# Patient Record
Sex: MALE | Race: BLACK OR AFRICAN AMERICAN | NOT HISPANIC OR LATINO | Employment: OTHER | URBAN - METROPOLITAN AREA
[De-identification: names, ages, dates, MRNs, and addresses within clinical notes are randomized per-mention and may not be internally consistent; named-entity substitution may affect disease eponyms.]

---

## 2017-08-08 ENCOUNTER — LAB VISIT (OUTPATIENT)
Dept: LAB | Facility: HOSPITAL | Age: 57
End: 2017-08-08
Attending: NURSE PRACTITIONER
Payer: MEDICAID

## 2017-08-08 DIAGNOSIS — I10 ESSENTIAL HYPERTENSION, MALIGNANT: ICD-10-CM

## 2017-08-08 DIAGNOSIS — I50.20 HEART FAILURE, SYSTOLIC: ICD-10-CM

## 2017-08-08 LAB
ALBUMIN SERPL BCP-MCNC: 4 G/DL
ALP SERPL-CCNC: 78 U/L
ALT SERPL W/O P-5'-P-CCNC: 16 U/L
ANION GAP SERPL CALC-SCNC: 13 MMOL/L
AST SERPL-CCNC: 14 U/L
BASOPHILS # BLD AUTO: 0.03 K/UL
BASOPHILS NFR BLD: 0.5 %
BILIRUB SERPL-MCNC: 0.5 MG/DL
BUN SERPL-MCNC: 15 MG/DL
CALCIUM SERPL-MCNC: 9.2 MG/DL
CHLORIDE SERPL-SCNC: 105 MMOL/L
CO2 SERPL-SCNC: 21 MMOL/L
CREAT SERPL-MCNC: 1.4 MG/DL
DIFFERENTIAL METHOD: NORMAL
EOSINOPHIL # BLD AUTO: 0.1 K/UL
EOSINOPHIL NFR BLD: 0.9 %
ERYTHROCYTE [DISTWIDTH] IN BLOOD BY AUTOMATED COUNT: 13.3 %
EST. GFR  (AFRICAN AMERICAN): >60 ML/MIN/1.73 M^2
EST. GFR  (NON AFRICAN AMERICAN): 56.2 ML/MIN/1.73 M^2
GLUCOSE SERPL-MCNC: 338 MG/DL
HCT VFR BLD AUTO: 42.8 %
HGB BLD-MCNC: 15 G/DL
LYMPHOCYTES # BLD AUTO: 1.5 K/UL
LYMPHOCYTES NFR BLD: 27 %
MCH RBC QN AUTO: 30.2 PG
MCHC RBC AUTO-ENTMCNC: 35 G/DL
MCV RBC AUTO: 86 FL
MONOCYTES # BLD AUTO: 0.4 K/UL
MONOCYTES NFR BLD: 7.2 %
NEUTROPHILS # BLD AUTO: 3.7 K/UL
NEUTROPHILS NFR BLD: 64 %
PLATELET # BLD AUTO: 211 K/UL
PMV BLD AUTO: 10.7 FL
POTASSIUM SERPL-SCNC: 4.5 MMOL/L
PROT SERPL-MCNC: 7.7 G/DL
RBC # BLD AUTO: 4.96 M/UL
SODIUM SERPL-SCNC: 139 MMOL/L
TSH SERPL DL<=0.005 MIU/L-ACNC: 0.69 UIU/ML
WBC # BLD AUTO: 5.7 K/UL

## 2017-08-08 PROCEDURE — 36415 COLL VENOUS BLD VENIPUNCTURE: CPT | Mod: PO

## 2017-08-08 PROCEDURE — 84443 ASSAY THYROID STIM HORMONE: CPT | Mod: PO

## 2017-08-08 PROCEDURE — 80053 COMPREHEN METABOLIC PANEL: CPT | Mod: PO

## 2017-08-08 PROCEDURE — 83036 HEMOGLOBIN GLYCOSYLATED A1C: CPT

## 2017-08-08 PROCEDURE — 85025 COMPLETE CBC W/AUTO DIFF WBC: CPT | Mod: PO

## 2017-08-09 LAB
ESTIMATED AVG GLUCOSE: 263 MG/DL
HBA1C MFR BLD HPLC: 10.8 %

## 2018-02-07 ENCOUNTER — HOSPITAL ENCOUNTER (OUTPATIENT)
Dept: RADIOLOGY | Facility: HOSPITAL | Age: 58
Discharge: HOME | End: 2018-02-07
Attending: NURSE PRACTITIONER
Payer: MEDICAID

## 2018-02-07 ENCOUNTER — HOSPITAL ENCOUNTER (OUTPATIENT)
Dept: CARDIOLOGY | Facility: CLINIC | Age: 58
Discharge: HOME | End: 2018-02-07
Payer: MEDICAID

## 2018-02-07 DIAGNOSIS — R07.9 CHEST PAIN, UNSPECIFIED: Primary | ICD-10-CM

## 2018-02-07 DIAGNOSIS — R07.9 CHEST PAIN, UNSPECIFIED: ICD-10-CM

## 2018-02-07 DIAGNOSIS — Z13.0 SCREENING FOR IRON DEFICIENCY ANEMIA: ICD-10-CM

## 2018-02-07 DIAGNOSIS — I10 HTN (HYPERTENSION): ICD-10-CM

## 2018-02-07 DIAGNOSIS — I21.9: ICD-10-CM

## 2018-02-07 DIAGNOSIS — R60.9 EDEMA: ICD-10-CM

## 2018-02-07 PROCEDURE — 93005 ELECTROCARDIOGRAM TRACING: CPT | Mod: PBBFAC,PO | Performed by: NURSE PRACTITIONER

## 2018-02-07 PROCEDURE — 71046 X-RAY EXAM CHEST 2 VIEWS: CPT | Mod: 26,,, | Performed by: RADIOLOGY

## 2018-02-07 PROCEDURE — 93010 ELECTROCARDIOGRAM REPORT: CPT | Mod: ,,, | Performed by: INTERNAL MEDICINE

## 2018-02-07 PROCEDURE — 71046 X-RAY EXAM CHEST 2 VIEWS: CPT | Mod: TC,PO

## 2018-04-23 ENCOUNTER — HOSPITAL ENCOUNTER (OUTPATIENT)
Dept: RADIOLOGY | Facility: HOSPITAL | Age: 58
Discharge: HOME | End: 2018-04-23
Attending: NURSE PRACTITIONER
Payer: MEDICAID

## 2018-04-23 DIAGNOSIS — I82.90 THROMBUS: ICD-10-CM

## 2018-04-23 DIAGNOSIS — I82.90 THROMBUS: Primary | ICD-10-CM

## 2018-04-23 PROCEDURE — 93970 EXTREMITY STUDY: CPT | Mod: 26,,, | Performed by: RADIOLOGY

## 2018-04-23 PROCEDURE — 93970 EXTREMITY STUDY: CPT | Mod: TC,PO

## 2021-03-18 ENCOUNTER — IMMUNIZATION (OUTPATIENT)
Dept: PRIMARY CARE CLINIC | Facility: CLINIC | Age: 61
End: 2021-03-18

## 2021-03-18 DIAGNOSIS — Z23 NEED FOR VACCINATION: Primary | ICD-10-CM

## 2021-03-18 PROCEDURE — 0011A COVID-19, MRNA, LNP-S, PF, 100 MCG/0.5 ML DOSE VACCINE: ICD-10-PCS | Mod: CV19,S$GLB,, | Performed by: FAMILY MEDICINE

## 2021-03-18 PROCEDURE — 0011A COVID-19, MRNA, LNP-S, PF, 100 MCG/0.5 ML DOSE VACCINE: CPT | Mod: CV19,S$GLB,, | Performed by: FAMILY MEDICINE

## 2021-03-18 PROCEDURE — 91301 COVID-19, MRNA, LNP-S, PF, 100 MCG/0.5 ML DOSE VACCINE: ICD-10-PCS | Mod: S$GLB,,, | Performed by: FAMILY MEDICINE

## 2021-03-18 PROCEDURE — 91301 COVID-19, MRNA, LNP-S, PF, 100 MCG/0.5 ML DOSE VACCINE: CPT | Mod: S$GLB,,, | Performed by: FAMILY MEDICINE

## 2021-04-15 ENCOUNTER — IMMUNIZATION (OUTPATIENT)
Dept: PRIMARY CARE CLINIC | Facility: CLINIC | Age: 61
End: 2021-04-15

## 2021-04-15 DIAGNOSIS — Z23 NEED FOR VACCINATION: Primary | ICD-10-CM

## 2021-04-15 PROCEDURE — 0012A COVID-19, MRNA, LNP-S, PF, 100 MCG/0.5 ML DOSE VACCINE: CPT | Mod: CV19,S$GLB,, | Performed by: FAMILY MEDICINE

## 2021-04-15 PROCEDURE — 0012A COVID-19, MRNA, LNP-S, PF, 100 MCG/0.5 ML DOSE VACCINE: ICD-10-PCS | Mod: CV19,S$GLB,, | Performed by: FAMILY MEDICINE

## 2021-04-15 PROCEDURE — 91301 COVID-19, MRNA, LNP-S, PF, 100 MCG/0.5 ML DOSE VACCINE: ICD-10-PCS | Mod: S$GLB,,, | Performed by: FAMILY MEDICINE

## 2021-04-15 PROCEDURE — 91301 COVID-19, MRNA, LNP-S, PF, 100 MCG/0.5 ML DOSE VACCINE: CPT | Mod: S$GLB,,, | Performed by: FAMILY MEDICINE

## 2023-10-07 ENCOUNTER — HOSPITAL ENCOUNTER (INPATIENT)
Facility: HOSPITAL | Age: 63
LOS: 5 days | Discharge: OTHER INSTITUTION | DRG: 287 | End: 2023-10-12
Attending: EMERGENCY MEDICINE | Admitting: SPECIALIST
Payer: MEDICAID

## 2023-10-07 DIAGNOSIS — I47.20 VENTRICULAR TACHYCARDIA: ICD-10-CM

## 2023-10-07 DIAGNOSIS — I49.01 VENTRICULAR FIBRILLATION: Primary | ICD-10-CM

## 2023-10-07 DIAGNOSIS — I25.118 CORONARY ARTERY DISEASE OF NATIVE ARTERY OF NATIVE HEART WITH STABLE ANGINA PECTORIS: ICD-10-CM

## 2023-10-07 DIAGNOSIS — E87.6 HYPOKALEMIA: ICD-10-CM

## 2023-10-07 DIAGNOSIS — I48.0 PAF (PAROXYSMAL ATRIAL FIBRILLATION): ICD-10-CM

## 2023-10-07 DIAGNOSIS — Z45.02 DEFIBRILLATOR DISCHARGE: ICD-10-CM

## 2023-10-07 DIAGNOSIS — I47.20 VT (VENTRICULAR TACHYCARDIA): ICD-10-CM

## 2023-10-07 DIAGNOSIS — R07.9 CHEST PAIN: ICD-10-CM

## 2023-10-07 DIAGNOSIS — I50.20 HFREF (HEART FAILURE WITH REDUCED EJECTION FRACTION): ICD-10-CM

## 2023-10-07 PROBLEM — I25.10 CAD (CORONARY ARTERY DISEASE): Status: ACTIVE | Noted: 2023-10-07

## 2023-10-07 PROBLEM — E11.9 DM (DIABETES MELLITUS): Status: ACTIVE | Noted: 2023-10-07

## 2023-10-07 LAB
ALBUMIN SERPL BCP-MCNC: 4.4 G/DL (ref 3.5–5.2)
ALP SERPL-CCNC: 74 U/L (ref 55–135)
ALT SERPL W/O P-5'-P-CCNC: 22 U/L (ref 10–44)
ANION GAP SERPL CALC-SCNC: 18 MMOL/L (ref 8–16)
AORTIC ROOT ANNULUS: 3.29 CM
ASCENDING AORTA: 2.79 CM
AST SERPL-CCNC: 23 U/L (ref 10–40)
AV INDEX (PROSTH): 0.56
AV MEAN GRADIENT: 3 MMHG
AV PEAK GRADIENT: 5 MMHG
AV REGURGITATION PRESSURE HALF TIME: 458.59 MS
AV VALVE AREA BY VELOCITY RATIO: 1.8 CM²
AV VALVE AREA: 1.58 CM²
AV VELOCITY RATIO: 0.63
BASOPHILS # BLD AUTO: 0.06 K/UL (ref 0–0.2)
BASOPHILS NFR BLD: 1.2 % (ref 0–1.9)
BILIRUB SERPL-MCNC: 0.5 MG/DL (ref 0.1–1)
BNP SERPL-MCNC: 641 PG/ML (ref 0–99)
BSA FOR ECHO PROCEDURE: 2.01 M2
BUN SERPL-MCNC: 29 MG/DL (ref 8–23)
CALCIUM SERPL-MCNC: 10.1 MG/DL (ref 8.7–10.5)
CHLORIDE SERPL-SCNC: 97 MMOL/L (ref 95–110)
CO2 SERPL-SCNC: 26 MMOL/L (ref 23–29)
CREAT SERPL-MCNC: 2.2 MG/DL (ref 0.5–1.4)
CV ECHO LV RWT: 0.26 CM
DIFFERENTIAL METHOD: NORMAL
DOP CALC AO PEAK VEL: 1.15 M/S
DOP CALC AO VTI: 23.2 CM
DOP CALC LVOT AREA: 2.8 CM2
DOP CALC LVOT DIAMETER: 1.9 CM
DOP CALC LVOT PEAK VEL: 0.73 M/S
DOP CALC LVOT STROKE VOLUME: 36.56 CM3
DOP CALC RVOT PEAK VEL: 0.75 M/S
DOP CALC RVOT VTI: 19.1 CM
DOP CALCLVOT PEAK VEL VTI: 12.9 CM
E WAVE DECELERATION TIME: 178.9 MSEC
E/A RATIO: 2.69
E/E' RATIO: 19.4 M/S
ECHO LV POSTERIOR WALL: 0.86 CM (ref 0.6–1.1)
EOSINOPHIL # BLD AUTO: 0.2 K/UL (ref 0–0.5)
EOSINOPHIL NFR BLD: 3 % (ref 0–8)
ERYTHROCYTE [DISTWIDTH] IN BLOOD BY AUTOMATED COUNT: 14.2 % (ref 11.5–14.5)
EST. GFR  (NO RACE VARIABLE): 33 ML/MIN/1.73 M^2
FRACTIONAL SHORTENING: 7 % (ref 28–44)
GLUCOSE SERPL-MCNC: 186 MG/DL (ref 70–110)
HCT VFR BLD AUTO: 42 % (ref 40–54)
HGB BLD-MCNC: 14.4 G/DL (ref 14–18)
IMM GRANULOCYTES # BLD AUTO: 0.01 K/UL (ref 0–0.04)
IMM GRANULOCYTES NFR BLD AUTO: 0.2 % (ref 0–0.5)
INTERVENTRICULAR SEPTUM: 0.78 CM (ref 0.6–1.1)
IVRT: 88.49 MSEC
LA MAJOR: 6.91 CM
LA MINOR: 6.97 CM
LA WIDTH: 4.6 CM
LEFT ATRIUM SIZE: 4.39 CM
LEFT ATRIUM VOLUME INDEX MOD: 55 ML/M2
LEFT ATRIUM VOLUME INDEX: 59 ML/M2
LEFT ATRIUM VOLUME MOD: 111.16 CM3
LEFT ATRIUM VOLUME: 119.12 CM3
LEFT INTERNAL DIMENSION IN SYSTOLE: 6.14 CM (ref 2.1–4)
LEFT VENTRICLE DIASTOLIC VOLUME INDEX: 110.51 ML/M2
LEFT VENTRICLE DIASTOLIC VOLUME: 223.24 ML
LEFT VENTRICLE MASS INDEX: 112 G/M2
LEFT VENTRICLE SYSTOLIC VOLUME INDEX: 93.9 ML/M2
LEFT VENTRICLE SYSTOLIC VOLUME: 189.69 ML
LEFT VENTRICULAR INTERNAL DIMENSION IN DIASTOLE: 6.6 CM (ref 3.5–6)
LEFT VENTRICULAR MASS: 226.89 G
LV LATERAL E/E' RATIO: 12.13 M/S
LV SEPTAL E/E' RATIO: 48.5 M/S
LVOT MG: 1.27 MMHG
LVOT MV: 0.53 CM/S
LYMPHOCYTES # BLD AUTO: 2 K/UL (ref 1–4.8)
LYMPHOCYTES NFR BLD: 38.9 % (ref 18–48)
MAGNESIUM SERPL-MCNC: 2.2 MG/DL (ref 1.6–2.6)
MCH RBC QN AUTO: 30.9 PG (ref 27–31)
MCHC RBC AUTO-ENTMCNC: 34.3 G/DL (ref 32–36)
MCV RBC AUTO: 90 FL (ref 82–98)
MONOCYTES # BLD AUTO: 0.5 K/UL (ref 0.3–1)
MONOCYTES NFR BLD: 9 % (ref 4–15)
MV PEAK A VEL: 0.36 M/S
MV PEAK E VEL: 0.97 M/S
MV STENOSIS PRESSURE HALF TIME: 51.88 MS
MV VALVE AREA P 1/2 METHOD: 4.24 CM2
NEUTROPHILS # BLD AUTO: 2.4 K/UL (ref 1.8–7.7)
NEUTROPHILS NFR BLD: 47.7 % (ref 38–73)
NRBC BLD-RTO: 0 /100 WBC
PHOSPHATE SERPL-MCNC: 3.7 MG/DL (ref 2.7–4.5)
PISA AR MAX VEL: 2.7 M/S
PISA TR MAX VEL: 2.59 M/S
PLATELET # BLD AUTO: 206 K/UL (ref 150–450)
PMV BLD AUTO: 10.5 FL (ref 9.2–12.9)
POCT GLUCOSE: 112 MG/DL (ref 70–110)
POCT GLUCOSE: 117 MG/DL (ref 70–110)
POTASSIUM SERPL-SCNC: 3.7 MMOL/L (ref 3.5–5.1)
PROT SERPL-MCNC: 8.7 G/DL (ref 6–8.4)
PV MEAN GRADIENT: 2 MMHG
PV MV: 0.56 M/S
PV PEAK GRADIENT: 2 MMHG
PV PEAK VELOCITY: 0.78 M/S
RA MAJOR: 5.02 CM
RA PRESSURE ESTIMATED: 8 MMHG
RA WIDTH: 4 CM
RBC # BLD AUTO: 4.66 M/UL (ref 4.6–6.2)
RIGHT VENTRICULAR END-DIASTOLIC DIMENSION: 2.76 CM
RV TB RVSP: 11 MMHG
SODIUM SERPL-SCNC: 141 MMOL/L (ref 136–145)
STJ: 2.9 CM
TDI LATERAL: 0.08 M/S
TDI SEPTAL: 0.02 M/S
TDI: 0.05 M/S
TR MAX PG: 27 MMHG
TRICUSPID ANNULAR PLANE SYSTOLIC EXCURSION: 1.8 CM
TROPONIN I SERPL DL<=0.01 NG/ML-MCNC: 0.06 NG/ML (ref 0–0.03)
TV REST PULMONARY ARTERY PRESSURE: 35 MMHG
WBC # BLD AUTO: 5.01 K/UL (ref 3.9–12.7)
Z-SCORE OF LEFT VENTRICULAR DIMENSION IN END DIASTOLE: 1.03
Z-SCORE OF LEFT VENTRICULAR DIMENSION IN END SYSTOLE: 4.11

## 2023-10-07 PROCEDURE — 82962 GLUCOSE BLOOD TEST: CPT

## 2023-10-07 PROCEDURE — 20000000 HC ICU ROOM

## 2023-10-07 PROCEDURE — 99223 1ST HOSP IP/OBS HIGH 75: CPT | Mod: ,,, | Performed by: INTERNAL MEDICINE

## 2023-10-07 PROCEDURE — 93010 ELECTROCARDIOGRAM REPORT: CPT | Mod: ,,, | Performed by: INTERNAL MEDICINE

## 2023-10-07 PROCEDURE — 83735 ASSAY OF MAGNESIUM: CPT | Performed by: REGISTERED NURSE

## 2023-10-07 PROCEDURE — 80053 COMPREHEN METABOLIC PANEL: CPT | Performed by: REGISTERED NURSE

## 2023-10-07 PROCEDURE — 25000003 PHARM REV CODE 250: Performed by: NURSE PRACTITIONER

## 2023-10-07 PROCEDURE — 84100 ASSAY OF PHOSPHORUS: CPT | Performed by: REGISTERED NURSE

## 2023-10-07 PROCEDURE — 63600175 PHARM REV CODE 636 W HCPCS: Performed by: EMERGENCY MEDICINE

## 2023-10-07 PROCEDURE — 84484 ASSAY OF TROPONIN QUANT: CPT | Performed by: REGISTERED NURSE

## 2023-10-07 PROCEDURE — 99223 PR INITIAL HOSPITAL CARE,LEVL III: ICD-10-PCS | Mod: ,,, | Performed by: INTERNAL MEDICINE

## 2023-10-07 PROCEDURE — 83880 ASSAY OF NATRIURETIC PEPTIDE: CPT | Performed by: REGISTERED NURSE

## 2023-10-07 PROCEDURE — 85025 COMPLETE CBC W/AUTO DIFF WBC: CPT | Performed by: REGISTERED NURSE

## 2023-10-07 PROCEDURE — 93010 EKG 12-LEAD: ICD-10-PCS | Mod: ,,, | Performed by: INTERNAL MEDICINE

## 2023-10-07 PROCEDURE — 99285 EMERGENCY DEPT VISIT HI MDM: CPT | Mod: 25

## 2023-10-07 PROCEDURE — 93005 ELECTROCARDIOGRAM TRACING: CPT

## 2023-10-07 RX ORDER — CARVEDILOL 6.25 MG/1
6.25 TABLET ORAL 2 TIMES DAILY
Status: ON HOLD | COMMUNITY
End: 2023-10-16 | Stop reason: HOSPADM

## 2023-10-07 RX ORDER — ASPIRIN 81 MG/1
81 TABLET ORAL DAILY
Status: DISCONTINUED | OUTPATIENT
Start: 2023-10-07 | End: 2023-10-07

## 2023-10-07 RX ORDER — EMPAGLIFLOZIN 10 MG/1
10 TABLET, FILM COATED ORAL
Status: ON HOLD | COMMUNITY
Start: 2023-09-25 | End: 2023-10-16 | Stop reason: HOSPADM

## 2023-10-07 RX ORDER — INSULIN ASPART 100 [IU]/ML
0-10 INJECTION, SOLUTION INTRAVENOUS; SUBCUTANEOUS EVERY 6 HOURS PRN
Status: DISCONTINUED | OUTPATIENT
Start: 2023-10-07 | End: 2023-10-12 | Stop reason: HOSPADM

## 2023-10-07 RX ORDER — CLOPIDOGREL BISULFATE 75 MG/1
75 TABLET ORAL DAILY
Status: DISCONTINUED | OUTPATIENT
Start: 2023-10-07 | End: 2023-10-07

## 2023-10-07 RX ORDER — MAGNESIUM SULFATE HEPTAHYDRATE 40 MG/ML
4 INJECTION, SOLUTION INTRAVENOUS
Status: DISCONTINUED | OUTPATIENT
Start: 2023-10-07 | End: 2023-10-12 | Stop reason: HOSPADM

## 2023-10-07 RX ORDER — POTASSIUM CHLORIDE 7.45 MG/ML
80 INJECTION INTRAVENOUS
Status: DISCONTINUED | OUTPATIENT
Start: 2023-10-07 | End: 2023-10-07

## 2023-10-07 RX ORDER — ASPIRIN 81 MG/1
81 TABLET ORAL DAILY
Status: DISCONTINUED | OUTPATIENT
Start: 2023-10-08 | End: 2023-10-12 | Stop reason: HOSPADM

## 2023-10-07 RX ORDER — GABAPENTIN 300 MG/1
300 CAPSULE ORAL NIGHTLY PRN
Status: ON HOLD | COMMUNITY
Start: 2023-09-23 | End: 2024-01-24 | Stop reason: HOSPADM

## 2023-10-07 RX ORDER — POTASSIUM CHLORIDE 7.45 MG/ML
40 INJECTION INTRAVENOUS
Status: DISCONTINUED | OUTPATIENT
Start: 2023-10-07 | End: 2023-10-07

## 2023-10-07 RX ORDER — POTASSIUM CHLORIDE 7.45 MG/ML
60 INJECTION INTRAVENOUS
Status: DISCONTINUED | OUTPATIENT
Start: 2023-10-07 | End: 2023-10-07

## 2023-10-07 RX ORDER — CLOPIDOGREL BISULFATE 75 MG/1
75 TABLET ORAL DAILY
Status: DISCONTINUED | OUTPATIENT
Start: 2023-10-08 | End: 2023-10-12 | Stop reason: HOSPADM

## 2023-10-07 RX ORDER — GLUCAGON 1 MG
1 KIT INJECTION
Status: DISCONTINUED | OUTPATIENT
Start: 2023-10-07 | End: 2023-10-12 | Stop reason: HOSPADM

## 2023-10-07 RX ORDER — ISOSORBIDE DINITRATE 20 MG/1
20 TABLET ORAL 3 TIMES DAILY
Status: DISCONTINUED | OUTPATIENT
Start: 2023-10-08 | End: 2023-10-08

## 2023-10-07 RX ORDER — FAMOTIDINE 20 MG/1
20 TABLET, FILM COATED ORAL DAILY
Status: DISCONTINUED | OUTPATIENT
Start: 2023-10-07 | End: 2023-10-12 | Stop reason: HOSPADM

## 2023-10-07 RX ORDER — ATORVASTATIN CALCIUM 40 MG/1
40 TABLET, FILM COATED ORAL DAILY
Status: DISCONTINUED | OUTPATIENT
Start: 2023-10-08 | End: 2023-10-12 | Stop reason: HOSPADM

## 2023-10-07 RX ORDER — METOLAZONE 5 MG/1
5 TABLET ORAL DAILY PRN
Status: ON HOLD | COMMUNITY
Start: 2023-09-14 | End: 2023-10-16 | Stop reason: HOSPADM

## 2023-10-07 RX ORDER — MAGNESIUM SULFATE HEPTAHYDRATE 40 MG/ML
2 INJECTION, SOLUTION INTRAVENOUS
Status: DISCONTINUED | OUTPATIENT
Start: 2023-10-07 | End: 2023-10-12 | Stop reason: HOSPADM

## 2023-10-07 RX ORDER — FUROSEMIDE 40 MG/1
40 TABLET ORAL 2 TIMES DAILY
Status: DISCONTINUED | OUTPATIENT
Start: 2023-10-07 | End: 2023-10-08

## 2023-10-07 RX ORDER — FUROSEMIDE 40 MG/1
40 TABLET ORAL 2 TIMES DAILY
Status: ON HOLD | COMMUNITY
Start: 2023-08-06 | End: 2023-10-16 | Stop reason: SDUPTHER

## 2023-10-07 RX ORDER — ATORVASTATIN CALCIUM 40 MG/1
40 TABLET, FILM COATED ORAL
Status: ON HOLD | COMMUNITY
Start: 2023-09-28 | End: 2024-01-24 | Stop reason: HOSPADM

## 2023-10-07 RX ORDER — SODIUM CHLORIDE 0.9 % (FLUSH) 0.9 %
10 SYRINGE (ML) INJECTION
Status: DISCONTINUED | OUTPATIENT
Start: 2023-10-07 | End: 2023-10-12 | Stop reason: HOSPADM

## 2023-10-07 RX ORDER — SACUBITRIL AND VALSARTAN 24; 26 MG/1; MG/1
1 TABLET, FILM COATED ORAL 2 TIMES DAILY
Status: ON HOLD | COMMUNITY
End: 2023-10-16 | Stop reason: HOSPADM

## 2023-10-07 RX ORDER — ISOSORBIDE DINITRATE 20 MG/1
20 TABLET ORAL 3 TIMES DAILY
Status: DISCONTINUED | OUTPATIENT
Start: 2023-10-07 | End: 2023-10-07

## 2023-10-07 RX ORDER — CALCIUM GLUCONATE 20 MG/ML
1 INJECTION, SOLUTION INTRAVENOUS
Status: DISCONTINUED | OUTPATIENT
Start: 2023-10-07 | End: 2023-10-12 | Stop reason: HOSPADM

## 2023-10-07 RX ORDER — CALCIUM GLUCONATE 20 MG/ML
3 INJECTION, SOLUTION INTRAVENOUS
Status: DISCONTINUED | OUTPATIENT
Start: 2023-10-07 | End: 2023-10-12 | Stop reason: HOSPADM

## 2023-10-07 RX ORDER — ATORVASTATIN CALCIUM 40 MG/1
40 TABLET, FILM COATED ORAL DAILY
Status: DISCONTINUED | OUTPATIENT
Start: 2023-10-07 | End: 2023-10-07

## 2023-10-07 RX ORDER — CALCIUM GLUCONATE 20 MG/ML
2 INJECTION, SOLUTION INTRAVENOUS
Status: DISCONTINUED | OUTPATIENT
Start: 2023-10-07 | End: 2023-10-12 | Stop reason: HOSPADM

## 2023-10-07 RX ADMIN — FAMOTIDINE 20 MG: 20 TABLET ORAL at 02:10

## 2023-10-07 RX ADMIN — AMIODARONE HYDROCHLORIDE 0.5 MG/MIN: 1.8 INJECTION, SOLUTION INTRAVENOUS at 08:10

## 2023-10-07 RX ADMIN — SACUBITRIL AND VALSARTAN 1 TABLET: 24; 26 TABLET, FILM COATED ORAL at 08:10

## 2023-10-07 RX ADMIN — AMIODARONE HYDROCHLORIDE 150 MG: 1.5 INJECTION, SOLUTION INTRAVENOUS at 02:10

## 2023-10-07 RX ADMIN — AMIODARONE HYDROCHLORIDE 1 MG/MIN: 1.8 INJECTION, SOLUTION INTRAVENOUS at 03:10

## 2023-10-07 RX ADMIN — AMIODARONE HYDROCHLORIDE 1 MG/MIN: 1.8 INJECTION, SOLUTION INTRAVENOUS at 02:10

## 2023-10-07 RX ADMIN — FUROSEMIDE 40 MG: 40 TABLET ORAL at 08:10

## 2023-10-07 NOTE — H&P
[facility] ([facility])  Critical Care Medicine  History & Physical    Patient Name: Radha Abbott  MRN: [id number]  Admission Date: 10/7/2023  Hospital Length of Stay: 0 days  Code Status: Full Code  Attending Physician: Mg Dial MD   Primary Care Provider: Vasu Kong MD   Principal Problem: <principal problem not specified>    Subjective:     HPI:  63 yr old with hx of CABG, CAD, HTN, HLD and defibrillator developed near syncope 4 days ago and just as he was passing out his defibrillator shocked him and he fully recovered. He did not seek help. This am he noticed a buzzing sound and came to get it checked. He has family at bedside. He is asymptomatic.       Hospital/ICU Course:  No notes on file     Past Medical History:   Diagnosis Date    CAD (coronary artery disease)     Diabetes mellitus     HFrEF (heart failure with reduced ejection fraction)     ICD (implantable cardioverter-defibrillator) in place     MI, old        Past Surgical History:   Procedure Laterality Date    CARDIAC SURGERY         Review of patient's allergies indicates:  No Known Allergies    Family History    None       Tobacco Use    Smoking status: Every Day     Current packs/day: 0.50     Types: Cigarettes    Smokeless tobacco: Not on file   Substance and Sexual Activity    Alcohol use: Yes     Comment: rarely    Drug use: No    Sexual activity: Not on file         Review of Systems   Constitutional: Negative.    HENT: Negative.     Respiratory: Negative.     Cardiovascular: Negative.    Gastrointestinal: Negative.    All other systems reviewed and are negative.    Objective:     Vital Signs (Most Recent):  Temp: 98.6 °F (37 °C) (10/07/23 0946)  Pulse: 70 (10/07/23 1457)  Resp: 16 (10/07/23 1457)  BP: 108/64 (10/07/23 1457)  SpO2: 99 % (10/07/23 1457) Vital Signs (24h Range):  Temp:  [98.6 °F (37 °C)] 98.6 °F (37 °C)  Pulse:  [] 70  Resp:  [15-22] 16  SpO2:  [99 %-100 %] 99 %  BP:  (103-127)/(58-82) 108/64     Weight: 79.8 kg (176 lb)  Body mass index is 23.87 kg/m².      Intake/Output Summary (Last 24 hours) at 10/7/2023 1524  Last data filed at 10/7/2023 1444  Gross per 24 hour   Intake 100 ml   Output --   Net 100 ml        Physical Exam  HENT:      Head: Normocephalic.   Cardiovascular:      Rate and Rhythm: Normal rate.      Pulses: Normal pulses.   Pulmonary:      Effort: Pulmonary effort is normal.   Abdominal:      General: Abdomen is flat.   Skin:     Capillary Refill: Capillary refill takes less than 2 seconds.   Neurological:      General: No focal deficit present.      Mental Status: He is alert.         Lines/Drains/Airways       Peripheral Intravenous Line  Duration                  Peripheral IV - Single Lumen 10/07/23 1109 20 G Left Forearm <1 day                    Significant Labs:    CBC/Anemia Profile:  Recent Labs   Lab 10/07/23  1109   WBC 5.01   HGB 14.4   HCT 42.0      MCV 90   RDW 14.2        Chemistries:  Recent Labs   Lab 10/07/23  1109      K 3.7   CL 97   CO2 26   BUN 29*   CREATININE 2.2*   CALCIUM 10.1   ALBUMIN 4.4   PROT 8.7*   BILITOT 0.5   ALKPHOS 74   ALT 22   AST 23   MG 2.2   PHOS 3.7       All pertinent labs within the past 24 hours have been reviewed.    Significant Imaging:   I have reviewed all pertinent imaging results/findings within the past 24 hours.    Assessment/Plan:     Cardiac/Vascular  HFrEF (heart failure with reduced ejection fraction)  Compensated  Resume home medications      CAD (coronary artery disease)  Continue home medications    Ventricular tachycardia  Admit to ICU  Amiodarone infusion  Cardiology consult  Investigate defibrillator - EP study ?    Endocrine  DM (diabetes mellitus)  Resume home meds  [facility]        Critical Care Daily Checklist:    A: Awake: RASS Goal/Actual Goal:    Actual:     B: Spontaneous Breathing Trial Performed?     C: SAT & SBT Coordinated?  n/a                      D: Delirium: CAM-ICU     E:  Early Mobility Performed? Yes   F: Feeding Goal:    Status:     Current Diet Order   Procedures    Diet NPO      AS: Analgesia/Sedation n/a   T: Thromboembolic Prophylaxis yes   H: HOB > 300 Yes   U: Stress Ulcer Prophylaxis (if needed) yes   G: Glucose Control yes   B: Bowel Function     I: Indwelling Catheter (Lines & [place]) Necessity yes   D: De-escalation of Antimicrobials/Pharmacotherapies yes    Plan for the day/ETD yes    Code Status:  Family/Goals of Care: Full Code  yes     Critical Care Time: 35 minutes  Critical secondary to Patient has a condition that poses threat to life and bodily function: Arrhythmia        Critical care was time spent personally by me on the following activities: development of treatment plan with patient or surrogate and bedside caregivers, discussions with consultants, evaluation of patient's response to treatment, examination of patient, ordering and performing treatments and interventions, ordering and review of laboratory studies, ordering and review of radiographic studies, pulse oximetry, re-evaluation of patient's condition. This critical care time did not overlap with that of any other provider or involve time for any procedures.     Mg Dial MD  Critical Care Medicine  [facility])

## 2023-10-07 NOTE — Clinical Note
Rounding (Video Assessment):  Yes    Comments: Video assessment completed.  Pt lying in bed.  Eyes closed, resp deep even and unlabored. NIBP currently 157/69.  NAD noted at present.   dry, intact, no bleeding and no hematoma.

## 2023-10-07 NOTE — ASSESSMENT & PLAN NOTE
Chest pain-free.  Troponin elevation possibly due to his VFib shocked.  Continue home medications.  Check magnesium and electrolytes trend trop

## 2023-10-07 NOTE — CONSULTS
[facility] ([facility])  Cardiology  Consult Note    Patient Name: Radha Abbott  MRN: [id number]  Admission Date: 10/7/2023  Hospital Length of Stay: 0 days  Code Status: Full Code   Attending Provider: Mg Dial MD   Consulting Provider: Luis M Moreau MD  Primary Care Physician: Vasu Kong MD  Principal Problem:<principal problem not specified>    Patient information was obtained from patient, past medical records and ER records.     Inpatient consult to Cardiology  Consult performed by: Luis M Moreau MD  Consult ordered by: Brennen Alexander MD  Reason for consult: ICD shock      Inpatient consult to Cardiology  Consult performed by: Luis M Moreau MD  Consult ordered by: Vijay Martinez NP  Reason for consult: ICD shock        Subjective:     Chief Complaint:  icd shock     HPI:   63 yr old with hx of CABG, CAD, HTN, HLD and defibrillator developed near syncope 4 days ago and just as he was passing out his defibrillator shocked him and he fully recovered. He did not seek help. This am he noticed a buzzing sound and came to get it checked. He has family at bedside. He is asymptomatic.        Currently feels well.  Will start him on amiodarone.  Reviewed his VFib episodes scan in the system.  He has a remote history of CABG back in 2009 since then with congestive heart failure.  States that he had his ICD done at that time it is a [employer] device.  Denies any chest pain or dyspnea at the moment.          Assessment and Plan:     HFrEF (heart failure with reduced ejection fraction)  Continue home meds.  Euvolemic    CAD (coronary artery disease)  Chest pain-free.  Troponin elevation possibly due to his VFib shocked.  Continue home medications.  Check magnesium and electrolytes trend trop      Ventricular tachycardia  Continue with beta-blocker from home.    Start IV amiodarone for 24 hours and then switch to 400 mg b.i.d for 1 week then 200 mg daily.  ICD interrogation  reviewed.  Does not appear to have lead issues, sometimes ICD vibration happens if there is a lead fracture.    Chest x-ray does not show any lead fracture.  Battery life 3.5 years  Reviewed his ICD interrogation lead impedance is good        VTE Risk Mitigation (From admission, onward)           Ordered     IP VTE LOW RISK PATIENT  Once         10/07/23 1352     Place sequential compression device  Until discontinued         10/07/23 1352                    Thank you for your consult. I will follow-up with patient. Please contact us if you have any additional questions.    Luis M Moreau MD  Cardiology   [facility] ([facility]

## 2023-10-07 NOTE — Clinical Note
The DP pulses were 2+ bilaterally. The radial pulses were +2 bilaterally. The brachial pulses were 2+ bilaterally.

## 2023-10-07 NOTE — SUBJECTIVE & OBJECTIVE
Past Medical History:   Diagnosis Date    CAD (coronary artery disease)     Diabetes mellitus     HFrEF (heart failure with reduced ejection fraction)     ICD (implantable cardioverter-defibrillator) in place     MI, old        Past Surgical History:   Procedure Laterality Date    CARDIAC SURGERY         Review of patient's allergies indicates:  No Known Allergies    Family History    None       Tobacco Use    Smoking status: Every Day     Current packs/day: 0.50     Types: Cigarettes    Smokeless tobacco: Not on file   Substance and Sexual Activity    Alcohol use: Yes     Comment: rarely    Drug use: No    Sexual activity: Not on file         Review of Systems   Constitutional: Negative.    HENT: Negative.     Respiratory: Negative.     Cardiovascular: Negative.    Gastrointestinal: Negative.    All other systems reviewed and are negative.    Objective:     Vital Signs (Most Recent):  Temp: 98.6 °F (37 °C) (10/07/23 0946)  Pulse: 70 (10/07/23 1457)  Resp: 16 (10/07/23 1457)  BP: 108/64 (10/07/23 1457)  SpO2: 99 % (10/07/23 1457) Vital Signs (24h Range):  Temp:  [98.6 °F (37 °C)] 98.6 °F (37 °C)  Pulse:  [] 70  Resp:  [15-22] 16  SpO2:  [99 %-100 %] 99 %  BP: (103-127)/(58-82) 108/64     Weight: 79.8 kg (176 lb)  Body mass index is 23.87 kg/m².      Intake/Output Summary (Last 24 hours) at 10/7/2023 1524  Last data filed at 10/7/2023 1444  Gross per 24 hour   Intake 100 ml   Output --   Net 100 ml        Physical Exam  HENT:      Head: Normocephalic.   Cardiovascular:      Rate and Rhythm: Normal rate.      Pulses: Normal pulses.   Pulmonary:      Effort: Pulmonary effort is normal.   Abdominal:      General: Abdomen is flat.   Skin:     Capillary Refill: Capillary refill takes less than 2 seconds.   Neurological:      General: No focal deficit present.      Mental Status: He is alert.         Lines/Drains/Airways       Peripheral Intravenous Line  Duration                  Peripheral IV - Single Lumen  10/07/23 1109 20 G Left Forearm <1 day                    Significant Labs:    CBC/Anemia Profile:  Recent Labs   Lab 10/07/23  1109   WBC 5.01   HGB 14.4   HCT 42.0      MCV 90   RDW 14.2        Chemistries:  Recent Labs   Lab 10/07/23  1109      K 3.7   CL 97   CO2 26   BUN 29*   CREATININE 2.2*   CALCIUM 10.1   ALBUMIN 4.4   PROT 8.7*   BILITOT 0.5   ALKPHOS 74   ALT 22   AST 23   MG 2.2   PHOS 3.7       All pertinent labs within the past 24 hours have been reviewed.    Significant Imaging:   I have reviewed all pertinent imaging results/findings within the past 24 hours.

## 2023-10-07 NOTE — SUBJECTIVE & OBJECTIVE
Past Medical History:   Diagnosis Date    CAD (coronary artery disease)     Diabetes mellitus     HFrEF (heart failure with reduced ejection fraction)     ICD (implantable cardioverter-defibrillator) in place     MI, old        Past Surgical History:   Procedure Laterality Date    CARDIAC SURGERY         Review of patient's allergies indicates:  No Known Allergies    No current facility-administered medications on file prior to encounter.     Current Outpatient Medications on File Prior to Encounter   Medication Sig    aspirin (ECOTRIN) 81 MG EC tablet Take 81 mg by mouth once daily.    atorvastatin (LIPITOR) 40 MG tablet Take 40 mg by mouth.    carvediloL (COREG) 6.25 MG tablet Take 6.25 mg by mouth 2 (two) times daily.    clopidogrel (PLAVIX) 75 mg tablet Take 75 mg by mouth once daily.    fish oil-omega-3 fatty acids 300-1,000 mg capsule Take 2 g by mouth once daily.    furosemide (LASIX) 40 MG tablet Take 40 mg by mouth 2 (two) times daily.    gabapentin (NEURONTIN) 300 MG capsule Take 300 mg by mouth nightly as needed.    isosorbide dinitrate (ISORDIL) 30 MG Tab Take 20 mg by mouth 3 (three) times daily.    JARDIANCE 10 mg tablet Take 10 mg by mouth.    metformin (GLUCOPHAGE) 1000 MG tablet Take 1,000 mg by mouth 2 (two) times daily with meals.    multivitamin capsule Take 1 capsule by mouth once daily.    sacubitriL-valsartan (ENTRESTO) 24-26 mg per tablet Take 1 tablet by mouth 2 (two) times daily.    cinnamon bark 500 mg capsule Take 1,000 mg by mouth once daily.    cyanocobalamin 2000 MCG tablet Take 3,000 mcg by mouth once daily.    metOLazone (ZAROXOLYN) 5 MG tablet Take 5 mg by mouth daily as needed.     Family History    None       Tobacco Use    Smoking status: Every Day     Current packs/day: 0.50     Types: Cigarettes    Smokeless tobacco: Not on file   Substance and Sexual Activity    Alcohol use: Yes     Comment: rarely    Drug use: No    Sexual activity: Not on file     Review of Systems    Cardiovascular:  Negative for chest pain, dyspnea on exertion, palpitations and syncope.   Genitourinary: Negative.    Neurological: Negative.      Objective:     Vital Signs (Most Recent):  Temp: 98.6 °F (37 °C) (10/07/23 0946)  Pulse: 70 (10/07/23 1457)  Resp: 16 (10/07/23 1457)  BP: 108/64 (10/07/23 1457)  SpO2: 99 % (10/07/23 1457) Vital Signs (24h Range):  Temp:  [98.6 °F (37 °C)] 98.6 °F (37 °C)  Pulse:  [] 70  Resp:  [15-22] 16  SpO2:  [99 %-100 %] 99 %  BP: (103-127)/(58-82) 108/64     Weight: 79.8 kg (176 lb)  Body mass index is 23.87 kg/m².    SpO2: 99 %         Intake/Output Summary (Last 24 hours) at 10/7/2023 1635  Last data filed at 10/7/2023 1444  Gross per 24 hour   Intake 100 ml   Output --   Net 100 ml       Lines/Drains/Airways       Peripheral Intravenous Line  Duration                  Peripheral IV - Single Lumen 10/07/23 1109 20 G Left Forearm <1 day                     Physical Exam  Vitals reviewed.   Constitutional:       Appearance: He is well-developed.   Neck:      Vascular: No carotid bruit.   Cardiovascular:      Rate and Rhythm: Normal rate and regular rhythm.      Pulses: Intact distal pulses.      Heart sounds: Normal heart sounds. No murmur heard.  Pulmonary:      Breath sounds: Normal breath sounds.   Neurological:      Mental Status: He is oriented to person, place, and time.          Significant Labs: Troponin   Recent Labs   Lab 10/07/23  1109   TROPONINI 0.063*   , All pertinent lab results from the last 24 hours have been reviewed., and   Recent Lab Results         10/07/23  1428   10/07/23  1109        Albumin   4.4       ALP   74       ALT   22       Anion Gap   18       AST   23       Baso #   0.06       Basophil %   1.2       BILIRUBIN TOTAL   0.5  Comment: For infants and newborns, interpretation of results should be based  on gestational age, weight and in agreement with clinical  observations.    Premature Infant recommended reference ranges:  Up to 24  hours.............<8.0 mg/dL  Up to 48 hours............<12.0 mg/dL  3-5 days..................<15.0 mg/dL  6-29 days.................<15.0 mg/dL         BNP   641  Comment: Values of less than 100 pg/ml are consistent with non-CHF populations.       BUN   29       Calcium   10.1       Chloride   97       CO2   26       Creatinine   2.2       Differential Method   Automated       eGFR   33       Eos #   0.2       Eosinophil %   3.0       Glucose   186       Gran # (ANC)   2.4       Gran %   47.7       Hematocrit   42.0       Hemoglobin   14.4       Immature Grans (Abs)   0.01  Comment: Mild elevation in immature granulocytes is non specific and   can be seen in a variety of conditions including stress response,   acute inflammation, trauma and pregnancy. Correlation with other   laboratory and clinical findings is essential.         Immature Granulocytes   0.2       Lymph #   2.0       Lymph %   38.9       Magnesium    2.2       MCH   30.9       MCHC   34.3       MCV   90       Mono #   0.5       Mono %   9.0       MPV   10.5       nRBC   0       Phosphorus Level   3.7       Platelet Count   206       POCT Glucose 117         Potassium   3.7       PROTEIN TOTAL   8.7       RBC   4.66       RDW   14.2       Sodium   141       Troponin I   0.063  Comment: The reference interval for Troponin I represents the 99th percentile   cutoff   for our facility and is consistent with 3rd generation assay   performance.         WBC   5.01               Significant Imaging:

## 2023-10-07 NOTE — ASSESSMENT & PLAN NOTE
Continue with beta-blocker from home.    Start IV amiodarone for 24 hours and then switch to 400 mg b.i.d for 1 week then 200 mg daily.  ICD interrogation reviewed.  Does not appear to have lead issues, sometimes ICD vibration happens if there is a lead fracture.    Chest x-ray does not show any lead fracture.  Battery life 3.5 years  Reviewed his ICD interrogation lead impedance is good

## 2023-10-07 NOTE — HPI
63 yr old with hx of CABG, CAD, HTN, HLD and defibrillator developed near syncope 4 days ago and just as he was passing out his defibrillator shocked him and he fully recovered. He did not seek help. This am he noticed a buzzing sound and came to get it checked. He has family at bedside. He is asymptomatic.

## 2023-10-07 NOTE — H&P (VIEW-ONLY)
[facility] ([facility])  Cardiology  Consult Note    Patient Name: Radha Abbott  MRN: [id number]  Admission Date: 10/7/2023  Hospital Length of Stay: 0 days  Code Status: Full Code   Attending Provider: Mg Dial MD   Consulting Provider: Luis M Moreau MD  Primary Care Physician: Vasu Kong MD  Principal Problem:<principal problem not specified>    Patient information was obtained from patient, past medical records and ER records.     Consults  Subjective:     Chief Complaint:  ICD shock     HPI:   63 yr old with hx of CABG, CAD, HTN, HLD and defibrillator developed near syncope 4 days ago and just as he was passing out his defibrillator shocked him and he fully recovered. He did not seek help. This am he noticed a buzzing sound and came to get it checked. He has family at bedside. He is asymptomatic.        Currently feels well.  Will start him on amiodarone.  Reviewed his VFib episodes scan in the system.  He has a remote history of CABG back in 2009 since then with congestive heart failure.  States that he had his ICD done at that time it is a Medtronic device.  Denies any chest pain or dyspnea at the moment.          Past Medical History:   Diagnosis Date    CAD (coronary artery disease)     Diabetes mellitus     HFrEF (heart failure with reduced ejection fraction)     ICD (implantable cardioverter-defibrillator) in place     MI, old        Past Surgical History:   Procedure Laterality Date    CARDIAC SURGERY         Review of patient's allergies indicates:  No Known Allergies    No current facility-administered medications on file prior to encounter.     Current Outpatient Medications on File Prior to Encounter   Medication Sig    aspirin (ECOTRIN) 81 MG EC tablet Take 81 mg by mouth once daily.    atorvastatin (LIPITOR) 40 MG tablet Take 40 mg by mouth.    carvediloL (COREG) 6.25 MG tablet Take 6.25 mg by mouth 2 (two) times daily.    clopidogrel (PLAVIX) 75 mg  tablet Take 75 mg by mouth once daily.    fish oil-omega-3 fatty acids 300-1,000 mg capsule Take 2 g by mouth once daily.    furosemide (LASIX) 40 MG tablet Take 40 mg by mouth 2 (two) times daily.    gabapentin (NEURONTIN) 300 MG capsule Take 300 mg by mouth nightly as needed.    isosorbide dinitrate (ISORDIL) 30 MG Tab Take 20 mg by mouth 3 (three) times daily.    JARDIANCE 10 mg tablet Take 10 mg by mouth.    metformin (GLUCOPHAGE) 1000 MG tablet Take 1,000 mg by mouth 2 (two) times daily with meals.    multivitamin capsule Take 1 capsule by mouth once daily.    sacubitriL-valsartan (ENTRESTO) 24-26 mg per tablet Take 1 tablet by mouth 2 (two) times daily.    cinnamon bark 500 mg capsule Take 1,000 mg by mouth once daily.    cyanocobalamin 2000 MCG tablet Take 3,000 mcg by mouth once daily.    metOLazone (ZAROXOLYN) 5 MG tablet Take 5 mg by mouth daily as needed.     Family History    None       Tobacco Use    Smoking status: Every Day     Current packs/day: 0.50     Types: Cigarettes    Smokeless tobacco: Not on file   Substance and Sexual Activity    Alcohol use: Yes     Comment: rarely    Drug use: No    Sexual activity: Not on file     Review of Systems   Cardiovascular:  Negative for chest pain, dyspnea on exertion, palpitations and syncope.   Genitourinary: Negative.    Neurological: Negative.      Objective:     Vital Signs (Most Recent):  Temp: 98.6 °F (37 °C) (10/07/23 0946)  Pulse: 70 (10/07/23 1457)  Resp: 16 (10/07/23 1457)  BP: 108/64 (10/07/23 1457)  SpO2: 99 % (10/07/23 1457) Vital Signs (24h Range):  Temp:  [98.6 °F (37 °C)] 98.6 °F (37 °C)  Pulse:  [] 70  Resp:  [15-22] 16  SpO2:  [99 %-100 %] 99 %  BP: (103-127)/(58-82) 108/64     Weight: 79.8 kg (176 lb)  Body mass index is 23.87 kg/m².    SpO2: 99 %         Intake/Output Summary (Last 24 hours) at 10/7/2023 1635  Last data filed at 10/7/2023 1444  Gross per 24 hour   Intake 100 ml   Output --   Net 100 ml        Lines/Drains/Airways       Peripheral Intravenous Line  Duration                  Peripheral IV - Single Lumen 10/07/23 1109 20 G Left Forearm <1 day                     Physical Exam  Vitals reviewed.   Constitutional:       Appearance: He is well-developed.   Neck:      Vascular: No carotid bruit.   Cardiovascular:      Rate and Rhythm: Normal rate and regular rhythm.      Pulses: Intact distal pulses.      Heart sounds: Normal heart sounds. No murmur heard.  Pulmonary:      Breath sounds: Normal breath sounds.   Neurological:      Mental Status: He is oriented to person, place, and time.          Significant Labs: Troponin   Recent Labs   Lab 10/07/23  1109   TROPONINI 0.063*   , All pertinent lab results from the last 24 hours have been reviewed., and   Recent Lab Results         10/07/23  1428   10/07/23  1109        Albumin   4.4       ALP   74       ALT   22       Anion Gap   18       AST   23       Baso #   0.06       Basophil %   1.2       BILIRUBIN TOTAL   0.5  Comment: For infants and newborns, interpretation of results should be based  on gestational age, weight and in agreement with clinical  observations.    Premature Infant recommended reference ranges:  Up to 24 hours.............<8.0 mg/dL  Up to 48 hours............<12.0 mg/dL  3-5 days..................<15.0 mg/dL  6-29 days.................<15.0 mg/dL         BNP   641  Comment: Values of less than 100 pg/ml are consistent with non-CHF populations.       BUN   29       Calcium   10.1       Chloride   97       CO2   26       Creatinine   2.2       Differential Method   Automated       eGFR   33       Eos #   0.2       Eosinophil %   3.0       Glucose   186       Gran # (ANC)   2.4       Gran %   47.7       Hematocrit   42.0       Hemoglobin   14.4       Immature Grans (Abs)   0.01  Comment: Mild elevation in immature granulocytes is non specific and   can be seen in a variety of conditions including stress response,   acute inflammation,  trauma and pregnancy. Correlation with other   laboratory and clinical findings is essential.         Immature Granulocytes   0.2       Lymph #   2.0       Lymph %   38.9       Magnesium    2.2       MCH   30.9       MCHC   34.3       MCV   90       Mono #   0.5       Mono %   9.0       MPV   10.5       nRBC   0       Phosphorus Level   3.7       Platelet Count   206       POCT Glucose 117         Potassium   3.7       PROTEIN TOTAL   8.7       RBC   4.66       RDW   14.2       Sodium   141       Troponin I   0.063  Comment: The reference interval for Troponin I represents the 99th percentile   cutoff   for our facility and is consistent with 3rd generation assay   performance.         WBC   5.01               Significant Imaging:     Assessment and Plan:     HFrEF (heart failure with reduced ejection fraction)  Continue home meds.  Euvolemic    CAD (coronary artery disease)  Chest pain-free.  Troponin elevation possibly due to his VFib shocked.  Continue home medications.  Check magnesium and electrolytes trend trop      Ventricular tachycardia  Continue with beta-blocker from home.    Start IV amiodarone for 24 hours and then switch to 400 mg b.i.d for 1 week then 200 mg daily.  ICD interrogation reviewed.  Does not appear to have lead issues, sometimes ICD vibration happens if there is a lead fracture.    Chest x-ray does not show any lead fracture.  Battery life 3.5 years  Reviewed his ICD interrogation lead impedance is good        VTE Risk Mitigation (From admission, onward)         Ordered     IP VTE LOW RISK PATIENT  Once         10/07/23 1352     Place sequential compression device  Until discontinued         10/07/23 1352                Thank you for your consult. I will follow-up with patient. Please contact us if you have any additional questions.    Luis M Moreau MD  Cardiology   [facility] ([facility])

## 2023-10-07 NOTE — ED PROVIDER NOTES
"SCRIBE #1 NOTE: I, Ayleen Solis, am scribing for, and in the presence of, Brennen Alexander MD. I have scribed the entire note.       History     Chief Complaint   Patient presents with    Pacemaker Problem     States his defibrillator shocked him 4 days ago, this morning it "buzzed". Denies chest pain or shortness of breath. Medtronic pacemaker.     Review of patient's allergies indicates:  No Known Allergies      History of Present Illness     HPI    10/7/2023, 10:05 AM  History obtained from the patient      History of Present Illness: Rahda Abbott is a 63 y.o. male patient with a PMHx of s/p CABG, heart failure, CAD, DM, and MI who presents to the Emergency Department after a loud alarm sounded from his defibrillator 1.5 hrs PTA. Pt had a defibrillator placed following a MI years PTA. Pt is currently symptom free and denies CP, SOB, and lightheadedness before, during, and after the alarm sounded this AM. Pt also denies dizziness, palpitations, and all other sxs at this time. Pt notes 4 days PTA, when he was watching TV, he felt lightheaded, had a syncopal episode, and was then shocked by his defibrillator. Pt reports he felt the shock go down to his feet. He denies CP and SOB before, during, or after this episode. Pt reports he felt fine after the episode. Pt states his Cardiologist is Kevin Franklin in [place], [place], and his defibrillator was placed at [facility]. Pt has not informed his cardiologist of either of these occurrences. Pt notes he is unable to interrogate his defibrillator at home.       Arrival mode: Personal vehicle      PCP: Vasu Kong MD        Past Medical History:  Past Medical History:   Diagnosis Date    CAD (coronary artery disease)     Diabetes mellitus     HFrEF (heart failure with reduced ejection fraction)     ICD (implantable cardioverter-defibrillator) in place     MI, old        Past Surgical History:  Past Surgical History:   Procedure Laterality Date    CARDIAC SURGERY "           Family History:  History reviewed. No pertinent family history.    Social History:  Social History     Tobacco Use    Smoking status: Every Day     Current packs/day: 0.50     Types: Cigarettes    Smokeless tobacco: Not on file   Substance and Sexual Activity    Alcohol use: Yes     Comment: rarely    Drug use: No    Sexual activity: Not on file        Review of Systems     Review of Systems   Constitutional:  Negative for fever.   HENT:  Negative for sore throat.    Respiratory:  Negative for shortness of breath.    Cardiovascular:  Negative for chest pain and palpitations.   Gastrointestinal:  Negative for nausea.   Genitourinary:  Negative for dysuria.   Musculoskeletal:  Negative for back pain.   Skin:  Negative for rash.   Neurological:  Negative for dizziness, weakness and light-headedness.   Hematological:  Does not bruise/bleed easily.   All other systems reviewed and are negative.       Physical Exam     Initial Vitals [10/07/23 0946]   BP Pulse Resp Temp SpO2   (!) 103/58 87 16 98.6 °F (37 °C) 100 %      MAP       --          Physical Exam   Nursing Notes and Vital Signs Reviewed.  Constitutional: Patient is in no acute distress. Well-developed and well-nourished.  Head: Atraumatic. Normocephalic.  Eyes: PERRL. EOM intact. Conjunctivae are not pale. No scleral icterus.  ENT: Mucous membranes are moist. Oropharynx is clear and symmetric.    Neck: Supple. Full ROM. No lymphadenopathy.  Cardiovascular: Regular rate. Regular rhythm. No murmurs, rubs, or gallops. Distal pulses are 2+ and symmetric.  Pulmonary/Chest: No respiratory distress. Clear to auscultation bilaterally. No wheezing or rales. Defibrillator in left upper chest wall.   Abdominal: Soft and non-distended.  There is no tenderness.  No rebound, guarding, or rigidity. Good bowel sounds.  Genitourinary: No CVA tenderness  Musculoskeletal: Moves all extremities. No obvious deformities. No edema. No calf tenderness.  Skin: Warm and  dry.  Neurological:  Alert, awake, and appropriate.  Normal speech.  No acute focal neurological deficits are appreciated.  Psychiatric: Normal affect. Good eye contact. Appropriate in content.     ED Course   Procedures  ED Vital Signs:  Vitals:    10/07/23 1441 10/07/23 1447 10/07/23 1457 10/07/23 1630   BP: 110/70 112/64 108/64 116/74   Pulse: 71 72 70 79   Resp: (!) 22 18 16 15   Temp:       TempSrc:       SpO2: 100% 100% 99% 99%   Weight:       Height:        10/07/23 1645 10/07/23 1700 10/07/23 1715 10/07/23 1730   BP: 116/64 (!) 101/55     Pulse: 72 71 88 69   Resp: (!) 21 18 (!) 27 20   Temp:       TempSrc:       SpO2: 100% 100% 100% 100%   Weight:       Height:        10/07/23 1745 10/07/23 1800 10/07/23 1815 10/07/23 1830   BP:   104/60    Pulse: 70 67 72 72   Resp: (!) 24 20 (!) 25 17   Temp:       TempSrc:       SpO2: 100% 100% 100% 100%   Weight:       Height:        10/07/23 1845 10/07/23 1900 10/07/23 2000   BP:  106/71 111/68   Pulse: 78 76 69   Resp: (!) 21 19 17   Temp:   98.3 °F (36.8 °C)   TempSrc:   Oral   SpO2: 97% 99% 98%   Weight:      Height:          Abnormal Lab Results:  Labs Reviewed   COMPREHENSIVE METABOLIC PANEL - Abnormal; Notable for the following components:       Result Value    Glucose 186 (*)     BUN 29 (*)     Creatinine 2.2 (*)     Total Protein 8.7 (*)     eGFR 33 (*)     Anion Gap 18 (*)     All other components within normal limits   B-TYPE NATRIURETIC PEPTIDE - Abnormal; Notable for the following components:     (*)     All other components within normal limits   TROPONIN I - Abnormal; Notable for the following components:    Troponin I 0.063 (*)     All other components within normal limits   POCT GLUCOSE - Abnormal; Notable for the following components:    POCT Glucose 117 (*)     All other components within normal limits   CBC W/ AUTO DIFFERENTIAL   MAGNESIUM   PHOSPHORUS   MAGNESIUM   PHOSPHORUS   POCT GLUCOSE MONITORING CONTINUOUS        All Lab  Results:  Results for orders placed or performed during the hospital encounter of 10/07/23   CBC Auto Differential   Result Value Ref Range    WBC 5.01 3.90 - 12.70 K/uL    RBC 4.66 4.60 - 6.20 M/uL    Hemoglobin 14.4 14.0 - 18.0 g/dL    Hematocrit 42.0 40.0 - 54.0 %    MCV 90 82 - 98 fL    MCH 30.9 27.0 - 31.0 pg    MCHC 34.3 32.0 - 36.0 g/dL    RDW 14.2 11.5 - 14.5 %    Platelets 206 150 - 450 K/uL    MPV 10.5 9.2 - 12.9 fL    Immature Granulocytes 0.2 0.0 - 0.5 %    Gran # (ANC) 2.4 1.8 - 7.7 K/uL    Immature Grans (Abs) 0.01 0.00 - 0.04 K/uL    Lymph # 2.0 1.0 - 4.8 K/uL    Mono # 0.5 0.3 - 1.0 K/uL    Eos # 0.2 0.0 - 0.5 K/uL    Baso # 0.06 0.00 - 0.20 K/uL    nRBC 0 0 /100 WBC    Gran % 47.7 38.0 - 73.0 %    Lymph % 38.9 18.0 - 48.0 %    Mono % 9.0 4.0 - 15.0 %    Eosinophil % 3.0 0.0 - 8.0 %    Basophil % 1.2 0.0 - 1.9 %    Differential Method Automated    Comprehensive Metabolic Panel   Result Value Ref Range    Sodium 141 136 - 145 mmol/L    Potassium 3.7 3.5 - 5.1 mmol/L    Chloride 97 95 - 110 mmol/L    CO2 26 23 - 29 mmol/L    Glucose 186 (H) 70 - 110 mg/dL    BUN 29 (H) 8 - 23 mg/dL    Creatinine 2.2 (H) 0.5 - 1.4 mg/dL    Calcium 10.1 8.7 - 10.5 mg/dL    Total Protein 8.7 (H) 6.0 - 8.4 g/dL    Albumin 4.4 3.5 - 5.2 g/dL    Total Bilirubin 0.5 0.1 - 1.0 mg/dL    Alkaline Phosphatase 74 55 - 135 U/L    AST 23 10 - 40 U/L    ALT 22 10 - 44 U/L    eGFR 33 (A) >60 mL/min/1.73 m^2    Anion Gap 18 (H) 8 - 16 mmol/L   BNP   Result Value Ref Range     (H) 0 - 99 pg/mL   Troponin I   Result Value Ref Range    Troponin I 0.063 (H) 0.000 - 0.026 ng/mL   Magnesium   Result Value Ref Range    Magnesium 2.2 1.6 - 2.6 mg/dL   Phosphorus   Result Value Ref Range    Phosphorus 3.7 2.7 - 4.5 mg/dL   Echo   Result Value Ref Range    BSA 2.01 m2    LVOT stroke volume 36.56 cm3    LVIDd 6.60 (A) 3.5 - 6.0 cm    LV Systolic Volume 189.69 mL    LV Systolic Volume Index 93.9 mL/m2    LVIDs 6.14 (A) 2.1 - 4.0 cm    LV  Diastolic Volume 223.24 mL    LV Diastolic Volume Index 110.51 mL/m2    IVS 0.78 0.6 - 1.1 cm    LVOT diameter 1.90 cm    LVOT area 2.8 cm2    FS 7 (A) 28 - 44 %    Left Ventricle Relative Wall Thickness 0.26 cm    Posterior Wall 0.86 0.6 - 1.1 cm    LV mass 226.89 g    LV Mass Index 112 g/m2    MV Peak E Zeferino 0.97 m/s    TDI LATERAL 0.08 m/s    TDI SEPTAL 0.02 m/s    E/E' ratio 19.40 m/s    MV Peak A Zeferino 0.36 m/s    TR Max Zeferino 2.59 m/s    E/A ratio 2.69     IVRT 88.49 msec    E wave deceleration time 178.90 msec    LV SEPTAL E/E' RATIO 48.50 m/s    LV LATERAL E/E' RATIO 12.13 m/s    LVOT peak zeferino 0.73 m/s    Left Ventricular Outflow Tract Mean Velocity 0.53 cm/s    Left Ventricular Outflow Tract Mean Gradient 1.27 mmHg    LA size 4.39 cm    Left Atrium Minor Axis 6.97 cm    Left Atrium Major Axis 6.91 cm    LA volume (mod) 111.16 cm3    LA Volume Index (Mod) 55.0 mL/m2    RVDD 2.76 cm    RVOT peak VTI 19.1 cm    TAPSE 1.80 cm    RA Major Axis 5.02 cm    AV regurgitation pressure 1/2 time [id number] ms    AR Max Zeferino 2.70 m/s    AV mean gradient 3 mmHg    AV peak gradient 5 mmHg    Ao peak zeferino 1.15 m/s    Ao VTI 23.20 cm    LVOT peak VTI 12.90 cm    AV valve area 1.58 cm²    AV Velocity Ratio 0.63     AV index (prosthetic) 0.56     ALLAN by Velocity Ratio 1.80 cm²    MV stenosis pressure 1/2 time 51.88 ms    MV valve area p 1/2 method 4.24 cm2    Triscuspid Valve Regurgitation Peak Gradient 27 mmHg    PV mean gradient 2 mmHg    PV PEAK VELOCITY 0.78 m/s    PV peak gradient 2 mmHg    Pulmonary Valve Mean Velocity 0.56 m/s    RVOT peak zeferino 0.75 m/s    Ao root annulus 3.29 cm    STJ 2.90 cm    Ascending aorta 2.79 cm    Mean e' 0.05 m/s    ZLVIDS 4.11     ZLVIDD 1.03     LA Volume Index 59.0 mL/m2    LA volume 119.12 cm3    LA WIDTH 4.6 cm    RA Width 4.0 cm    TV resting pulmonary artery pressure 35 mmHg    RV TB RVSP 11 mmHg    Est. RA pres 8 mmHg   POCT glucose   Result Value Ref Range    POCT Glucose 117 (H)  70 - 110 mg/dL   POCT glucose   Result Value Ref Range    POCT Glucose 112 (H) 70 - 110 mg/dL         Imaging Results:  Imaging Results              X-Ray Chest 1 View (Final result)  Result time 10/07/23 10:29:25      Final result by Pj Velarde MD (10/07/23 10:29:25)                   Impression:      No acute process seen.      Electronically signed by: Pj Velarde MD  Date:    10/07/2023  Time:    10:29               Narrative:    EXAMINATION:  XR CHEST 1 VIEW    CLINICAL HISTORY:  Encounter for adjustment and management of automatic implantable cardiac defibrillator    FINDINGS:  Single view of the chest.  Comparison 02/07/2018.  Left-sided generator and defibrillator wires demonstrates similar configuration.    Cardiac silhouette is normal.  The lungs demonstrate no evidence of active disease.  No evidence of pleural effusion or pneumothorax.  Bones appear intact.  Moderate degenerative changes and moderate atherosclerotic disease.  Sternotomy wires are intact.                                       The EKG was ordered, reviewed, and independently interpreted by the ED provider.  Interpretation time: 09:48  Rate: 74 BPM  Rhythm: Sinus rhythm with occasional premature ventricular complexes  Interpretation: Left axis deviation. LVH with QRS widening ([place] product, Romhilt-Mae). T wave abnormality, consider lateral ischemia. Abnormal ECG. No STEMI.  When compared to EKG performed 07-FEB-2018 10:48, premature ventricular complexes are now present and QRS duration has increased.             The Emergency Provider reviewed the vital signs and test results, which are outlined above.     ED Discussion     1:40 PM: Discussed pt's case with Dr. Eric Moreau MD (Cardiology) who recommends putting pt on amio drip, checking mg and k, and admitting to ICU.    1:43 PM: Discussed case with Guadalupe Martinez NP (Hospital Medicine). Dr. Bobby Dial MD agrees with current care and management of pt  and accepts admission.   Admitting Service: Critical Care Medicine  Admitting Physician: Dr. Dial  Admit to: ICU    1:45 PM: Re-evaluated pt. I have discussed test results, shared treatment plan, and the need for admission with patient and family at bedside. Pt and family express understanding at this time and agree with all information. All questions answered. Pt and family have no further questions or concerns at this time. Pt is ready for admit.       Medical Decision Making  64 y/o M with defibrilator implant here due to being shocked after a syncopal episode 4 days ago, also had alarm sound today with a buzzing feel.. Interrogation shows 1 treated VF episode since Jun 2023 on Oct 2.  His labs showing Cr in IVÁN range, BNP slightly elevated, Trop 0.06 at baseline. EKG with PVC, no STEMI.    Problems Addressed:  Ventricular fibrillation: acute illness or injury with systemic symptoms that poses a threat to life or bodily functions    Amount and/or Complexity of Data Reviewed  Labs: ordered. Decision-making details documented in ED Course.  Radiology: ordered and independent interpretation performed. Decision-making details documented in ED Course.     Details: Agree with radiologist interpretation  ECG/medicine tests: ordered and independent interpretation performed. Decision-making details documented in ED Course.    Risk  Prescription drug management.  Decision regarding hospitalization.    Critical Care  Total time providing critical care: 36 minutes                ED Medication(s):  Medications   amiodarone 360 mg/200 mL (1.8 mg/mL) infusion (1 mg/min Intravenous Verify Only 10/7/23 1800)   amiodarone 360 mg/200 mL (1.8 mg/mL) infusion (0.5 mg/min Intravenous New Bag 10/7/23 2029)   glucagon (human recombinant) injection 1 mg (has no administration in time range)   sodium chloride 0.9% flush 10 mL (has no administration in time range)   magnesium sulfate 2g in water 50mL IVPB (premix) (has no  administration in time range)   magnesium sulfate 2g in water 50mL IVPB (premix) (has no administration in time range)   sodium phosphate 15 mmol in dextrose 5 % (D5W) 250 mL IVPB (has no administration in time range)   sodium phosphate 20.01 mmol in dextrose 5 % (D5W) 250 mL IVPB (has no administration in time range)   sodium phosphate 30 mmol in dextrose 5 % (D5W) 250 mL IVPB (has no administration in time range)   calcium gluconate 1 g in NS IVPB (premixed) (has no administration in time range)   calcium gluconate 1 g in NS IVPB (premixed) (has no administration in time range)   calcium gluconate 1 g in NS IVPB (premixed) (has no administration in time range)   famotidine tablet 20 mg (20 mg Oral Given 10/7/23 1433)   insulin aspart U-100 pen 0-10 Units (has no administration in time range)   dextrose 10% bolus 125 mL 125 mL (has no administration in time range)   dextrose 10% bolus 250 mL 250 mL (has no administration in time range)   sacubitriL-valsartan 24-26 mg per tablet 1 tablet (1 tablet Oral Given 10/7/23 2025)   furosemide tablet 40 mg (40 mg Oral Given 10/7/23 2025)   aspirin EC tablet 81 mg (has no administration in time range)   atorvastatin tablet 40 mg (has no administration in time range)   clopidogreL tablet 75 mg (has no administration in time range)   isosorbide dinitrate tablet 20 mg (has no administration in time range)   amiodarone in dextrose 150 mg/100 mL (1.5 mg/mL) loading dose 150 mg (0 mg Intravenous Stopped 10/7/23 1444)       Current Discharge Medication List                  Scribe Attestation:   Scribe #1: I performed the above scribed service and the documentation accurately describes the services I performed. I attest to the accuracy of the note.     Attending:   Physician Attestation Statement for Scribe #1: I, Brennen Alexander MD, personally performed the services described in this documentation, as scribed by Ayleen Solis, in my presence, and it is both accurate and complete.            Clinical Impression       ICD-10-CM ICD-9-CM   1. Ventricular fibrillation  I49.01 427.41   2. Defibrillator discharge  Z45.02 V71.89       Disposition:   Disposition: Admitted  Condition: Serious         Brennen Alexander MD  10/07/23 4979

## 2023-10-07 NOTE — Clinical Note
The right brachial was prepped. The site was prepped with ChloraPrep. The site was clipped. The patient was draped.

## 2023-10-07 NOTE — SUBJECTIVE & OBJECTIVE
Past Medical History:   Diagnosis Date    CAD (coronary artery disease)     Diabetes mellitus     HFrEF (heart failure with reduced ejection fraction)     ICD (implantable cardioverter-defibrillator) in place     MI, old        Past Surgical History:   Procedure Laterality Date    CARDIAC SURGERY         Review of patient's allergies indicates:  No Known Allergies    No current facility-administered medications on file prior to encounter.     Current Outpatient Medications on File Prior to Encounter   Medication Sig    aspirin (ECOTRIN) 81 MG EC tablet Take 81 mg by mouth once daily.    atorvastatin (LIPITOR) 40 MG tablet Take 40 mg by mouth.    carvediloL (COREG) 6.25 MG tablet Take 6.25 mg by mouth 2 (two) times daily.    clopidogrel (PLAVIX) 75 mg tablet Take 75 mg by mouth once daily.    fish oil-omega-3 fatty acids 300-1,000 mg capsule Take 2 g by mouth once daily.    furosemide (LASIX) 40 MG tablet Take 40 mg by mouth 2 (two) times daily.    gabapentin (NEURONTIN) 300 MG capsule Take 300 mg by mouth nightly as needed.    isosorbide dinitrate (ISORDIL) 30 MG Tab Take 20 mg by mouth 3 (three) times daily.    JARDIANCE 10 mg tablet Take 10 mg by mouth.    metformin (GLUCOPHAGE) 1000 MG tablet Take 1,000 mg by mouth 2 (two) times daily with meals.    multivitamin capsule Take 1 capsule by mouth once daily.    sacubitriL-valsartan (ENTRESTO) 24-26 mg per tablet Take 1 tablet by mouth 2 (two) times daily.    cinnamon bark 500 mg capsule Take 1,000 mg by mouth once daily.    cyanocobalamin 2000 MCG tablet Take 3,000 mcg by mouth once daily.    metOLazone (ZAROXOLYN) 5 MG tablet Take 5 mg by mouth daily as needed.     Family History    None       Tobacco Use    Smoking status: Every Day     Current packs/day: 0.50     Types: Cigarettes    Smokeless tobacco: Not on file   Substance and Sexual Activity    Alcohol use: Yes     Comment: rarely    Drug use: No    Sexual activity: Not on file     Review of Systems    Cardiovascular:  Negative for chest pain, dyspnea on exertion, palpitations and syncope.   Genitourinary: Negative.    Neurological: Negative.      Objective:     Vital Signs (Most Recent):  Temp: 98.6 °F (37 °C) (10/07/23 0946)  Pulse: 70 (10/07/23 1457)  Resp: 16 (10/07/23 1457)  BP: 108/64 (10/07/23 1457)  SpO2: 99 % (10/07/23 1457) Vital Signs (24h Range):  Temp:  [98.6 °F (37 °C)] 98.6 °F (37 °C)  Pulse:  [] 70  Resp:  [15-22] 16  SpO2:  [99 %-100 %] 99 %  BP: (103-127)/(58-82) 108/64     Weight: 79.8 kg (176 lb)  Body mass index is 23.87 kg/m².    SpO2: 99 %         Intake/Output Summary (Last 24 hours) at 10/7/2023 1539  Last data filed at 10/7/2023 1444  Gross per 24 hour   Intake 100 ml   Output --   Net 100 ml       Lines/Drains/Airways       Peripheral Intravenous Line  Duration                  Peripheral IV - Single Lumen 10/07/23 1109 20 G Left Forearm <1 day                     Physical Exam  Vitals reviewed.   Constitutional:       Appearance: He is well-developed.   Neck:      Vascular: No carotid bruit.   Cardiovascular:      Rate and Rhythm: Normal rate and regular rhythm.      Pulses: Intact distal pulses.      Heart sounds: Normal heart sounds. No murmur heard.  Pulmonary:      Breath sounds: Normal breath sounds.   Neurological:      Mental Status: He is oriented to person, place, and time.          Significant Labs: Troponin   Recent Labs   Lab 10/07/23  1109   TROPONINI 0.063*    and All pertinent lab results from the last 24 hours have been reviewed.    Significant Imaging:

## 2023-10-07 NOTE — Clinical Note
The PA catheter was repositioned to the main pulmonary artery. Hemodynamics were performed. Cardiac output was obtained at 2 L/min.

## 2023-10-07 NOTE — Clinical Note
Diagnosis: Defibrillator discharge [095443]   Admitting Provider:: WISAM VÁZQUEZ [[id number]]   Future Attending Provider: WISAM VÁZQUEZ [[id number]]   Reason for IP Medical Treatment  (Clinical interventions that can only be accomplished in the IP setting? ) :: VF   I certify that Inpatient services for greater than or equal to 2 midnights are medically necessary:: No   Plans for Post-Acute care--if anticipated (pick the single best option):: A. No post acute care anticipated at this time   Special Needs:: No Special Needs [1]   9

## 2023-10-07 NOTE — HPI
63 yr old with hx of CABG, CAD, HTN, HLD and defibrillator developed near syncope 4 days ago and just as he was passing out his defibrillator shocked him and he fully recovered. He did not seek help. This am he noticed a buzzing sound and came to get it checked. He has family at bedside. He is asymptomatic.        Currently feels well.  Will start him on amiodarone.  Reviewed his VFib episodes scan in the system.  He has a remote history of CABG back in 2009 since then with congestive heart failure.  States that he had his ICD done at that time it is a [employer] device.  Denies any chest pain or dyspnea at the moment.

## 2023-10-07 NOTE — CONSULTS
[facility] ([facility])  Cardiology  Consult Note    Patient Name: Radha Abbott  MRN: [id number]  Admission Date: 10/7/2023  Hospital Length of Stay: 0 days  Code Status: Full Code   Attending Provider: Mg Dial MD   Consulting Provider: Lui sM Moreau MD  Primary Care Physician: Vasu Kong MD  Principal Problem:<principal problem not specified>    Patient information was obtained from patient, past medical records and ER records.     Consults  Subjective:     Chief Complaint:  ICD shock     HPI:   63 yr old with hx of CABG, CAD, HTN, HLD and defibrillator developed near syncope 4 days ago and just as he was passing out his defibrillator shocked him and he fully recovered. He did not seek help. This am he noticed a buzzing sound and came to get it checked. He has family at bedside. He is asymptomatic.        Currently feels well.  Will start him on amiodarone.  Reviewed his VFib episodes scan in the system.  He has a remote history of CABG back in 2009 since then with congestive heart failure.  States that he had his ICD done at that time it is a Medtronic device.  Denies any chest pain or dyspnea at the moment.          Past Medical History:   Diagnosis Date    CAD (coronary artery disease)     Diabetes mellitus     HFrEF (heart failure with reduced ejection fraction)     ICD (implantable cardioverter-defibrillator) in place     MI, old        Past Surgical History:   Procedure Laterality Date    CARDIAC SURGERY         Review of patient's allergies indicates:  No Known Allergies    No current facility-administered medications on file prior to encounter.     Current Outpatient Medications on File Prior to Encounter   Medication Sig    aspirin (ECOTRIN) 81 MG EC tablet Take 81 mg by mouth once daily.    atorvastatin (LIPITOR) 40 MG tablet Take 40 mg by mouth.    carvediloL (COREG) 6.25 MG tablet Take 6.25 mg by mouth 2 (two) times daily.    clopidogrel (PLAVIX) 75 mg  tablet Take 75 mg by mouth once daily.    fish oil-omega-3 fatty acids 300-1,000 mg capsule Take 2 g by mouth once daily.    furosemide (LASIX) 40 MG tablet Take 40 mg by mouth 2 (two) times daily.    gabapentin (NEURONTIN) 300 MG capsule Take 300 mg by mouth nightly as needed.    isosorbide dinitrate (ISORDIL) 30 MG Tab Take 20 mg by mouth 3 (three) times daily.    JARDIANCE 10 mg tablet Take 10 mg by mouth.    metformin (GLUCOPHAGE) 1000 MG tablet Take 1,000 mg by mouth 2 (two) times daily with meals.    multivitamin capsule Take 1 capsule by mouth once daily.    sacubitriL-valsartan (ENTRESTO) 24-26 mg per tablet Take 1 tablet by mouth 2 (two) times daily.    cinnamon bark 500 mg capsule Take 1,000 mg by mouth once daily.    cyanocobalamin 2000 MCG tablet Take 3,000 mcg by mouth once daily.    metOLazone (ZAROXOLYN) 5 MG tablet Take 5 mg by mouth daily as needed.     Family History    None       Tobacco Use    Smoking status: Every Day     Current packs/day: 0.50     Types: Cigarettes    Smokeless tobacco: Not on file   Substance and Sexual Activity    Alcohol use: Yes     Comment: rarely    Drug use: No    Sexual activity: Not on file     Review of Systems   Cardiovascular:  Negative for chest pain, dyspnea on exertion, palpitations and syncope.   Genitourinary: Negative.    Neurological: Negative.      Objective:     Vital Signs (Most Recent):  Temp: 98.6 °F (37 °C) (10/07/23 0946)  Pulse: 70 (10/07/23 1457)  Resp: 16 (10/07/23 1457)  BP: 108/64 (10/07/23 1457)  SpO2: 99 % (10/07/23 1457) Vital Signs (24h Range):  Temp:  [98.6 °F (37 °C)] 98.6 °F (37 °C)  Pulse:  [] 70  Resp:  [15-22] 16  SpO2:  [99 %-100 %] 99 %  BP: (103-127)/(58-82) 108/64     Weight: 79.8 kg (176 lb)  Body mass index is 23.87 kg/m².    SpO2: 99 %         Intake/Output Summary (Last 24 hours) at 10/7/2023 1635  Last data filed at 10/7/2023 1444  Gross per 24 hour   Intake 100 ml   Output --   Net 100 ml        Lines/Drains/Airways       Peripheral Intravenous Line  Duration                  Peripheral IV - Single Lumen 10/07/23 1109 20 G Left Forearm <1 day                     Physical Exam  Vitals reviewed.   Constitutional:       Appearance: He is well-developed.   Neck:      Vascular: No carotid bruit.   Cardiovascular:      Rate and Rhythm: Normal rate and regular rhythm.      Pulses: Intact distal pulses.      Heart sounds: Normal heart sounds. No murmur heard.  Pulmonary:      Breath sounds: Normal breath sounds.   Neurological:      Mental Status: He is oriented to person, place, and time.          Significant Labs: Troponin   Recent Labs   Lab 10/07/23  1109   TROPONINI 0.063*   , All pertinent lab results from the last 24 hours have been reviewed., and   Recent Lab Results         10/07/23  1428   10/07/23  1109        Albumin   4.4       ALP   74       ALT   22       Anion Gap   18       AST   23       Baso #   0.06       Basophil %   1.2       BILIRUBIN TOTAL   0.5  Comment: For infants and newborns, interpretation of results should be based  on gestational age, weight and in agreement with clinical  observations.    Premature Infant recommended reference ranges:  Up to 24 hours.............<8.0 mg/dL  Up to 48 hours............<12.0 mg/dL  3-5 days..................<15.0 mg/dL  6-29 days.................<15.0 mg/dL         BNP   641  Comment: Values of less than 100 pg/ml are consistent with non-CHF populations.       BUN   29       Calcium   10.1       Chloride   97       CO2   26       Creatinine   2.2       Differential Method   Automated       eGFR   33       Eos #   0.2       Eosinophil %   3.0       Glucose   186       Gran # (ANC)   2.4       Gran %   47.7       Hematocrit   42.0       Hemoglobin   14.4       Immature Grans (Abs)   0.01  Comment: Mild elevation in immature granulocytes is non specific and   can be seen in a variety of conditions including stress response,   acute inflammation,  trauma and pregnancy. Correlation with other   laboratory and clinical findings is essential.         Immature Granulocytes   0.2       Lymph #   2.0       Lymph %   38.9       Magnesium    2.2       MCH   30.9       MCHC   34.3       MCV   90       Mono #   0.5       Mono %   9.0       MPV   10.5       nRBC   0       Phosphorus Level   3.7       Platelet Count   206       POCT Glucose 117         Potassium   3.7       PROTEIN TOTAL   8.7       RBC   4.66       RDW   14.2       Sodium   141       Troponin I   0.063  Comment: The reference interval for Troponin I represents the 99th percentile   cutoff   for our facility and is consistent with 3rd generation assay   performance.         WBC   5.01               Significant Imaging:     Assessment and Plan:     HFrEF (heart failure with reduced ejection fraction)  Continue home meds.  Euvolemic    CAD (coronary artery disease)  Chest pain-free.  Troponin elevation possibly due to his VFib shocked.  Continue home medications.  Check magnesium and electrolytes trend trop      Ventricular tachycardia  Continue with beta-blocker from home.    Start IV amiodarone for 24 hours and then switch to 400 mg b.i.d for 1 week then 200 mg daily.  ICD interrogation reviewed.  Does not appear to have lead issues, sometimes ICD vibration happens if there is a lead fracture.    Chest x-ray does not show any lead fracture.  Battery life 3.5 years  Reviewed his ICD interrogation lead impedance is good        VTE Risk Mitigation (From admission, onward)         Ordered     IP VTE LOW RISK PATIENT  Once         10/07/23 1352     Place sequential compression device  Until discontinued         10/07/23 1352                Thank you for your consult. I will follow-up with patient. Please contact us if you have any additional questions.    Luis M Moreau MD  Cardiology   [facility] ([facility])

## 2023-10-07 NOTE — ASSESSMENT & PLAN NOTE
Continue with beta-blocker from home.    Start IV amiodarone for 24 hours and then switch to 400 mg b.i.d for 1 week then 200 mg daily.  ICD interrogation reviewed.  Does not appear to have lead issues, sometimes ICD vibration happens if there is a lead fracture.  Will review all interrogation report in detail with EP  EP consult on Monday.

## 2023-10-08 PROBLEM — N17.9 AKI (ACUTE KIDNEY INJURY): Status: ACTIVE | Noted: 2023-10-08

## 2023-10-08 PROBLEM — E87.6 HYPOKALEMIA: Status: ACTIVE | Noted: 2023-10-08

## 2023-10-08 LAB
ANION GAP SERPL CALC-SCNC: 17 MMOL/L (ref 8–16)
BACTERIA #/AREA URNS HPF: NORMAL /HPF
BASOPHILS # BLD AUTO: 0.03 K/UL (ref 0–0.2)
BASOPHILS NFR BLD: 0.6 % (ref 0–1.9)
BILIRUB UR QL STRIP: NEGATIVE
BUN SERPL-MCNC: 30 MG/DL (ref 8–23)
CALCIUM SERPL-MCNC: 9.6 MG/DL (ref 8.7–10.5)
CHLORIDE SERPL-SCNC: 98 MMOL/L (ref 95–110)
CLARITY UR: CLEAR
CO2 SERPL-SCNC: 25 MMOL/L (ref 23–29)
COLOR UR: YELLOW
CREAT SERPL-MCNC: 2.2 MG/DL (ref 0.5–1.4)
DIFFERENTIAL METHOD: ABNORMAL
EOSINOPHIL # BLD AUTO: 0.1 K/UL (ref 0–0.5)
EOSINOPHIL NFR BLD: 2.1 % (ref 0–8)
ERYTHROCYTE [DISTWIDTH] IN BLOOD BY AUTOMATED COUNT: 14.1 % (ref 11.5–14.5)
EST. GFR  (NO RACE VARIABLE): 33 ML/MIN/1.73 M^2
GLUCOSE SERPL-MCNC: 152 MG/DL (ref 70–110)
GLUCOSE UR QL STRIP: ABNORMAL
HCT VFR BLD AUTO: 39.4 % (ref 40–54)
HGB BLD-MCNC: 13.6 G/DL (ref 14–18)
HGB UR QL STRIP: NEGATIVE
IMM GRANULOCYTES # BLD AUTO: 0.02 K/UL (ref 0–0.04)
IMM GRANULOCYTES NFR BLD AUTO: 0.4 % (ref 0–0.5)
KETONES UR QL STRIP: NEGATIVE
LEUKOCYTE ESTERASE UR QL STRIP: NEGATIVE
LYMPHOCYTES # BLD AUTO: 1.9 K/UL (ref 1–4.8)
LYMPHOCYTES NFR BLD: 40 % (ref 18–48)
MAGNESIUM SERPL-MCNC: 2 MG/DL (ref 1.6–2.6)
MCH RBC QN AUTO: 30.8 PG (ref 27–31)
MCHC RBC AUTO-ENTMCNC: 34.5 G/DL (ref 32–36)
MCV RBC AUTO: 89 FL (ref 82–98)
MICROSCOPIC COMMENT: NORMAL
MONOCYTES # BLD AUTO: 0.5 K/UL (ref 0.3–1)
MONOCYTES NFR BLD: 9.7 % (ref 4–15)
NEUTROPHILS # BLD AUTO: 2.2 K/UL (ref 1.8–7.7)
NEUTROPHILS NFR BLD: 47.2 % (ref 38–73)
NITRITE UR QL STRIP: NEGATIVE
NRBC BLD-RTO: 0 /100 WBC
PH UR STRIP: 6 [PH] (ref 5–8)
PLATELET # BLD AUTO: 203 K/UL (ref 150–450)
PMV BLD AUTO: 10.8 FL (ref 9.2–12.9)
POCT GLUCOSE: 134 MG/DL (ref 70–110)
POCT GLUCOSE: 160 MG/DL (ref 70–110)
POCT GLUCOSE: 196 MG/DL (ref 70–110)
POTASSIUM SERPL-SCNC: 3.4 MMOL/L (ref 3.5–5.1)
PROT UR QL STRIP: ABNORMAL
RBC # BLD AUTO: 4.42 M/UL (ref 4.6–6.2)
SODIUM SERPL-SCNC: 140 MMOL/L (ref 136–145)
SP GR UR STRIP: 1.02 (ref 1–1.03)
URN SPEC COLLECT METH UR: ABNORMAL
UROBILINOGEN UR STRIP-ACNC: NEGATIVE EU/DL
WBC # BLD AUTO: 4.73 K/UL (ref 3.9–12.7)
YEAST URNS QL MICRO: NORMAL

## 2023-10-08 PROCEDURE — 99233 SBSQ HOSP IP/OBS HIGH 50: CPT | Mod: ,,, | Performed by: INTERNAL MEDICINE

## 2023-10-08 PROCEDURE — 25000003 PHARM REV CODE 250: Performed by: INTERNAL MEDICINE

## 2023-10-08 PROCEDURE — 63600175 PHARM REV CODE 636 W HCPCS: Performed by: NURSE PRACTITIONER

## 2023-10-08 PROCEDURE — 25000003 PHARM REV CODE 250: Performed by: NURSE PRACTITIONER

## 2023-10-08 PROCEDURE — 36415 COLL VENOUS BLD VENIPUNCTURE: CPT | Performed by: NURSE PRACTITIONER

## 2023-10-08 PROCEDURE — 99233 PR SUBSEQUENT HOSPITAL CARE,LEVL III: ICD-10-PCS | Mod: ,,, | Performed by: INTERNAL MEDICINE

## 2023-10-08 PROCEDURE — 83735 ASSAY OF MAGNESIUM: CPT | Performed by: NURSE PRACTITIONER

## 2023-10-08 PROCEDURE — 21400001 HC TELEMETRY ROOM

## 2023-10-08 PROCEDURE — 25000003 PHARM REV CODE 250: Performed by: SPECIALIST

## 2023-10-08 PROCEDURE — 81000 URINALYSIS NONAUTO W/SCOPE: CPT | Performed by: HOSPITALIST

## 2023-10-08 PROCEDURE — 85025 COMPLETE CBC W/AUTO DIFF WBC: CPT | Performed by: NURSE PRACTITIONER

## 2023-10-08 PROCEDURE — 80048 BASIC METABOLIC PNL TOTAL CA: CPT | Performed by: NURSE PRACTITIONER

## 2023-10-08 PROCEDURE — 63600175 PHARM REV CODE 636 W HCPCS: Performed by: EMERGENCY MEDICINE

## 2023-10-08 RX ORDER — METOPROLOL SUCCINATE 25 MG/1
25 TABLET, EXTENDED RELEASE ORAL DAILY
Status: DISCONTINUED | OUTPATIENT
Start: 2023-10-09 | End: 2023-10-12 | Stop reason: HOSPADM

## 2023-10-08 RX ORDER — AMIODARONE HYDROCHLORIDE 200 MG/1
400 TABLET ORAL 2 TIMES DAILY
Status: COMPLETED | OUTPATIENT
Start: 2023-10-08 | End: 2023-10-11

## 2023-10-08 RX ORDER — POTASSIUM CHLORIDE 20 MEQ/1
40 TABLET, EXTENDED RELEASE ORAL ONCE
Status: COMPLETED | OUTPATIENT
Start: 2023-10-08 | End: 2023-10-08

## 2023-10-08 RX ORDER — MUPIROCIN 20 MG/G
OINTMENT TOPICAL 2 TIMES DAILY
Status: DISCONTINUED | OUTPATIENT
Start: 2023-10-08 | End: 2023-10-12 | Stop reason: HOSPADM

## 2023-10-08 RX ADMIN — ATORVASTATIN CALCIUM 40 MG: 40 TABLET, FILM COATED ORAL at 08:10

## 2023-10-08 RX ADMIN — AMIODARONE HYDROCHLORIDE 0.5 MG/MIN: 1.8 INJECTION, SOLUTION INTRAVENOUS at 08:10

## 2023-10-08 RX ADMIN — ASPIRIN 81 MG: 81 TABLET, COATED ORAL at 08:10

## 2023-10-08 RX ADMIN — MUPIROCIN: 20 OINTMENT TOPICAL at 09:10

## 2023-10-08 RX ADMIN — AMIODARONE HYDROCHLORIDE 400 MG: 200 TABLET ORAL at 11:10

## 2023-10-08 RX ADMIN — ISOSORBIDE DINITRATE 20 MG: 20 TABLET ORAL at 08:10

## 2023-10-08 RX ADMIN — SACUBITRIL AND VALSARTAN 1 TABLET: 24; 26 TABLET, FILM COATED ORAL at 08:10

## 2023-10-08 RX ADMIN — CLOPIDOGREL BISULFATE 75 MG: 75 TABLET ORAL at 08:10

## 2023-10-08 RX ADMIN — AMIODARONE HYDROCHLORIDE 400 MG: 200 TABLET ORAL at 09:10

## 2023-10-08 RX ADMIN — POTASSIUM CHLORIDE 40 MEQ: 1500 TABLET, EXTENDED RELEASE ORAL at 05:10

## 2023-10-08 RX ADMIN — MUPIROCIN: 20 OINTMENT TOPICAL at 08:10

## 2023-10-08 RX ADMIN — INSULIN ASPART 2 UNITS: 100 INJECTION, SOLUTION INTRAVENOUS; SUBCUTANEOUS at 05:10

## 2023-10-08 RX ADMIN — FAMOTIDINE 20 MG: 20 TABLET ORAL at 08:10

## 2023-10-08 NOTE — PLAN OF CARE
Patient AAO x 4. Room air, normal sinus on telemetry. Amio drip infusing at 0.5mg. Ambulated to commode. Voids per urinal, BM overnight. Safety measures in place. Uneventful shift. Patient updated on plan of care.

## 2023-10-08 NOTE — PROGRESS NOTES
[facility]  Cardiology  Progress Note    Patient Name: Radha Abbott  MRN: [id number]  Admission Date: 10/7/2023  Hospital Length of Stay: 1 days  Code Status: Full Code   Attending Physician: Joaquin Avalos MD   Primary Care Physician: Vasu Kong MD  Expected Discharge Date:   Principal Problem:<principal problem not specified>    Subjective:     Hospital Course:   No more shocks overnight.  However his monitor showed 1 episode of nonsustained V-tach for 20 beats 1:00 a.m..      Interval History:     Review of Systems   Cardiovascular:  Positive for chest pain. Negative for dyspnea on exertion.     Objective:     Vital Signs (Most Recent):  Temp: 98.2 °F (36.8 °C) (10/08/23 0700)  Pulse: 68 (10/08/23 0845)  Resp: (!) 26 (10/08/23 0845)  BP: 102/60 (10/08/23 0845)  SpO2: 99 % (10/08/23 0845) Vital Signs (24h Range):  Temp:  [98.2 °F (36.8 °C)-98.4 °F (36.9 °C)] 98.2 °F (36.8 °C)  Pulse:  [63-88] 68  Resp:  [14-27] 26  SpO2:  [93 %-100 %] 99 %  BP: ()/(54-74) 102/60     Weight: 81.7 kg (180 lb 1.9 oz)  Body mass index is 24.43 kg/m².     SpO2: 99 %         Intake/Output Summary (Last 24 hours) at 10/8/2023 1104  Last data filed at 10/8/2023 0900  Gross per 24 hour   Intake 492.08 ml   Output 450 ml   Net 42.08 ml       Lines/Drains/Airways       Peripheral Intravenous Line  Duration                  Peripheral IV - Single Lumen 10/07/23 1109 20 G Left Forearm <1 day                       Physical Exam  Vitals reviewed.   Constitutional:       Appearance: He is well-developed.   Neck:      Vascular: No carotid bruit.   Cardiovascular:      Rate and Rhythm: Normal rate and regular rhythm.      Pulses: Intact distal pulses.      Heart sounds: Normal heart sounds. No murmur heard.  Pulmonary:      Breath sounds: Normal breath sounds.   Neurological:      Mental Status: He is oriented to person, place, and time.            Significant Labs: All pertinent lab results from the last 24  hours have been reviewed. and   Recent Lab Results  (Last 5 results in the past 24 hours)        10/08/23  0506   10/08/23  0311   10/08/23  0033   10/07/23  1800   10/07/23  1428        Anion Gap   17             Ao root annulus               Ascending aorta               Ao peak zeferino               Ao VTI               AR Max Zeferino               AV valve area               ALLAN by Velocity Ratio               AV mean gradient               AV index (prosthetic)               AV peak gradient               AV regurgitation pressure 1/2 time               AV Velocity Ratio               Baso #   0.03             Basophil %   0.6             BSA               BUN   30             Calcium   9.6             Chloride   98             CO2   25             Creatinine   2.2             Left Ventricle Relative Wall Thickness               Differential Method   Automated             E/A ratio               E/E' ratio               eGFR   33             Eos #   0.1             Eosinophil %   2.1             E wave deceleration time               FS               Glucose   152             Gran # (ANC)   2.2             Gran %   47.2             Hematocrit   39.4             Hemoglobin   13.6             Immature Grans (Abs)   0.02  Comment: Mild elevation in immature granulocytes is non specific and   can be seen in a variety of conditions including stress response,   acute inflammation, trauma and pregnancy. Correlation with other   laboratory and clinical findings is essential.               Immature Granulocytes   0.4             IVRT               IVSd               LA WIDTH               Left Atrium Major Axis               Left Atrium Minor Axis               LA size               LA volume               LA vol index               LA Volume Index (Mod)               LVOT area               LV LATERAL E/E' RATIO               LV SEPTAL E/E' RATIO               LV EDV BP               LV Diastolic Volume Index                LVIDd               LVIDs               LV mass               LV Mass Index               Left Ventricular Outflow Tract Mean Gradient               Left Ventricular Outflow Tract Mean Velocity               LVOT diameter               LVOT peak zeferino               LVOT stroke volume               LVOT peak VTI               LV ESV BP               LV Systolic Volume Index               Lymph #   1.9             Lymph %   40.0             Magnesium    2.0             MCH   30.8             MCHC   34.5             MCV   89             Mean e'               Mono #   0.5             Mono %   9.7             MPV   10.8             MV valve area p 1/2 method               MV Peak A Zeferino               MV Peak E Zeferino               MV stenosis pressure 1/2 time               nRBC   0             Platelet Count   203             POCT Glucose 160     134   112   117       Potassium   3.4             Pulmonary Valve Mean Velocity               PV mean gradient               PV peak gradient               PV PEAK VELOCITY               Posterior Wall               RA Major Axis               Est. RA pres               RA Width               RBC   4.42             RDW   14.1             RV TB RVSP               RVDD               RVOT peak zeferino               RVOT peak VTI               Sodium   140             STJ               TAPSE               TDI SEPTAL               TDI LATERAL               Triscuspid Valve Regurgitation Peak Gradient               TR Max Zeferino               TV resting pulmonary artery pressure               WBC   4.73             ZLVIDD               ZLVIDS                                      Significant Imaging: Echocardiogram: Transthoracic echo (TTE) complete (Cupid Only):   Results for orders placed or performed during the hospital encounter of 10/07/23   Echo   Result Value Ref Range    BSA 2.01 m2    LVOT stroke volume 36.56 cm3    LVIDd 6.60 (A) 3.5 - 6.0 cm    LV Systolic Volume 189.69 mL    LV  Systolic Volume Index 93.9 mL/m2    LVIDs 6.14 (A) 2.1 - 4.0 cm    LV Diastolic Volume 223.24 mL    LV Diastolic Volume Index 110.51 mL/m2    IVS 0.78 0.6 - 1.1 cm    LVOT diameter 1.90 cm    LVOT area 2.8 cm2    FS 7 (A) 28 - 44 %    Left Ventricle Relative Wall Thickness 0.26 cm    Posterior Wall 0.86 0.6 - 1.1 cm    LV mass 226.89 g    LV Mass Index 112 g/m2    MV Peak E Zeferino 0.97 m/s    TDI LATERAL 0.08 m/s    TDI SEPTAL 0.02 m/s    E/E' ratio 19.40 m/s    MV Peak A Zeferino 0.36 m/s    TR Max Zeferino 2.59 m/s    E/A ratio 2.69     IVRT 88.49 msec    E wave deceleration time 178.90 msec    LV SEPTAL E/E' RATIO 48.50 m/s    LV LATERAL E/E' RATIO 12.13 m/s    LVOT peak zeferino 0.73 m/s    Left Ventricular Outflow Tract Mean Velocity 0.53 cm/s    Left Ventricular Outflow Tract Mean Gradient 1.27 mmHg    LA size 4.39 cm    Left Atrium Minor Axis 6.97 cm    Left Atrium Major Axis 6.91 cm    LA volume (mod) 111.16 cm3    LA Volume Index (Mod) 55.0 mL/m2    RVDD 2.76 cm    RVOT peak VTI 19.1 cm    TAPSE 1.80 cm    RA Major Axis 5.02 cm    AV regurgitation pressure 1/2 time [id number] ms    AR Max Zeferino 2.70 m/s    AV mean gradient 3 mmHg    AV peak gradient 5 mmHg    Ao peak zeferino 1.15 m/s    Ao VTI 23.20 cm    LVOT peak VTI 12.90 cm    AV valve area 1.58 cm²    AV Velocity Ratio 0.63     AV index (prosthetic) 0.56     ALLAN by Velocity Ratio 1.80 cm²    MV stenosis pressure 1/2 time 51.88 ms    MV valve area p 1/2 method 4.24 cm2    Triscuspid Valve Regurgitation Peak Gradient 27 mmHg    PV mean gradient 2 mmHg    PV PEAK VELOCITY 0.78 m/s    PV peak gradient 2 mmHg    Pulmonary Valve Mean Velocity 0.56 m/s    RVOT peak zeferino 0.75 m/s    Ao root annulus 3.29 cm    STJ 2.90 cm    Ascending aorta 2.79 cm    Mean e' 0.05 m/s    ZLVIDS 4.11     ZLVIDD 1.03     LA Volume Index 59.0 mL/m2    LA volume 119.12 cm3    LA WIDTH 4.6 cm    RA Width 4.0 cm    TV resting pulmonary artery pressure 35 mmHg    RV TB RVSP 11 mmHg    Est. RA pres 8  mmHg    Narrative      Left Ventricle: The left ventricle is severely dilated. Wall is thinner   than normal. Severe global hypokinesis present. There is severely reduced   systolic function with a visually estimated ejection fraction of 15 - 20%.   Grade III diastolic dysfunction.    Left Atrium: Left atrium is severely dilated.    Right Ventricle: Normal right ventricular cavity size. Wall thickness   is normal. Right ventricle wall motion  is normal. Systolic function is   normal. Pacemaker lead present in the ventricle.    Aortic Valve: There is moderate aortic regurgitation.    Mitral Valve: There is mild to moderate regurgitation.    Tricuspid Valve: There is mild regurgitation.    IVC/SVC: Intermediate venous pressure at 8 mmHg.       Assessment and Plan:     Brief HPI:     HFrEF (heart failure with reduced ejection fraction)  Continue home meds.  Euvolemic    CAD (coronary artery disease)  Chest pain-free.  Troponin elevation possibly due to his VFib shocked.  Continue home medications.  Check magnesium and electrolytes trend trop      Ventricular tachycardia  Continue with beta-blocker from home.    Start IV amiodarone for 24 hours and then switch to 400 mg b.i.d for 1 week then 200 mg daily.  ICD interrogation reviewed.  Does not appear to have lead issues, sometimes ICD vibration happens if there is a lead fracture.    Chest x-ray does not show any lead fracture.  Battery life 3.5 years  Reviewed his ICD interrogation lead impedance is good    10.8.2023  Reviewed his tele events.  He had 20 beats run of V-tach at 1:00 a.m..  Review of his ICD interrogation showed more than 2000 episodes of nonsustained V-tach in the last few months.  He is on carvedilol at home.  Currently blood pressure running low to resume carvedilol.  Once BP allows will start him on low-dose Toprol 25 mg.    We will hold his isosorbide for now and the Lasix  Will change amiodarone IV to p.o. 400 mg b.i.d. for 1 week then 200  mg daily.        VTE Risk Mitigation (From admission, onward)         Ordered     IP VTE LOW RISK PATIENT  Once         10/07/23 1352     Place sequential compression device  Until discontinued         10/07/23 1352                Luis M Moreau MD  Cardiology  [facility] - Intensive Care ([facility])

## 2023-10-08 NOTE — HPI
62 y/o male with PMHx of CABG, CAD, HTN, HLD and defibrillator developed near syncope 4 days ago and just as he was passing out his defibrillator shocked him and he fully recovered. He did not seek help. This am he noticed a buzzing sound and came to get it checked. He has family at bedside. He is asymptomatic. Patient admitted to ICU on 10/7/23. Cardiology consulted - noted V-fib episodes on ICD interrogation, started on IV amiodarone.  ----  Patient noted with 20 beat v-tach overnight  Amiodarone IV transitioned to PO  Marginal BP this morning, BP meds held  Cardiology following

## 2023-10-08 NOTE — ASSESSMENT & PLAN NOTE
Patient with acute kidney injury/acute renal failure likely due to workup pending IVÁN is currently stable. Baseline creatinine 1.17 per OSH epic chart review - Labs reviewed- Renal function/electrolytes with Estimated Creatinine Clearance: 38.7 mL/min (A) (based on SCr of 2.2 mg/dL (H)). according to latest data. Monitor urine output and serial BMP and adjust therapy as needed. Avoid nephrotoxins and renally dose meds for GFR listed above.   Check UA  Renal ultrasound unremarkable

## 2023-10-08 NOTE — ASSESSMENT & PLAN NOTE
Continue with beta-blocker from home.    Start IV amiodarone for 24 hours and then switch to 400 mg b.i.d for 1 week then 200 mg daily.  ICD interrogation reviewed.  Does not appear to have lead issues, sometimes ICD vibration happens if there is a lead fracture.    Chest x-ray does not show any lead fracture.  Battery life 3.5 years  Reviewed his ICD interrogation lead impedance is good    10.8.2023  Reviewed his 10 events.  He had 20 beats run of V-tach at 1:00 a.m..  Review of his ICD interrogation showed more than 2000 episodes of nonsustained V-tach in the last few months.  He is on carvedilol at home.  Currently blood pressure running low for resume and his carvedilol.  Once BP allows will start him on low-dose Toprol 25 mg.    We will hold his isosorbide for now and the Lasix  Will amiodarone IV to p.o. 400 mg b.i.d. for 1 week then 200 mg daily.

## 2023-10-08 NOTE — ASSESSMENT & PLAN NOTE
NSVT that did not require activation  Stable on amiodarone infusion.  Changed to PO  Stable for transfer to [facility] and then plan per cardiology

## 2023-10-08 NOTE — SUBJECTIVE & OBJECTIVE
Interval History:     Review of Systems   Cardiovascular:  Positive for chest pain. Negative for dyspnea on exertion.     Objective:     Vital Signs (Most Recent):  Temp: 98.2 °F (36.8 °C) (10/08/23 0700)  Pulse: 68 (10/08/23 0845)  Resp: (!) 26 (10/08/23 0845)  BP: 102/60 (10/08/23 0845)  SpO2: 99 % (10/08/23 0845) Vital Signs (24h Range):  Temp:  [98.2 °F (36.8 °C)-98.4 °F (36.9 °C)] 98.2 °F (36.8 °C)  Pulse:  [63-88] 68  Resp:  [14-27] 26  SpO2:  [93 %-100 %] 99 %  BP: ()/(54-74) 102/60     Weight: 81.7 kg (180 lb 1.9 oz)  Body mass index is 24.43 kg/m².     SpO2: 99 %         Intake/Output Summary (Last 24 hours) at 10/8/2023 1104  Last data filed at 10/8/2023 0900  Gross per 24 hour   Intake 492.08 ml   Output 450 ml   Net 42.08 ml       Lines/Drains/Airways       Peripheral Intravenous Line  Duration                  Peripheral IV - Single Lumen 10/07/23 1109 20 G Left Forearm <1 day                       Physical Exam  Vitals reviewed.   Constitutional:       Appearance: He is well-developed.   Neck:      Vascular: No carotid bruit.   Cardiovascular:      Rate and Rhythm: Normal rate and regular rhythm.      Pulses: Intact distal pulses.      Heart sounds: Normal heart sounds. No murmur heard.  Pulmonary:      Breath sounds: Normal breath sounds.   Neurological:      Mental Status: He is oriented to person, place, and time.            Significant Labs: All pertinent lab results from the last 24 hours have been reviewed. and   Recent Lab Results  (Last 5 results in the past 24 hours)        10/08/23  0506   10/08/23  0311   10/08/23  0033   10/07/23  1800   10/07/23  1428        Anion Gap   17             Ao root annulus               Ascending aorta               Ao peak zeferino               Ao VTI               AR Max Zeferino               AV valve area               ALLAN by Velocity Ratio               AV mean gradient               AV index (prosthetic)               AV peak gradient               AV  regurgitation pressure 1/2 time               AV Velocity Ratio               Baso #   0.03             Basophil %   0.6             BSA               BUN   30             Calcium   9.6             Chloride   98             CO2   25             Creatinine   2.2             Left Ventricle Relative Wall Thickness               Differential Method   Automated             E/A ratio               E/E' ratio               eGFR   33             Eos #   0.1             Eosinophil %   2.1             E wave deceleration time               FS               Glucose   152             Gran # (ANC)   2.2             Gran %   47.2             Hematocrit   39.4             Hemoglobin   13.6             Immature Grans (Abs)   0.02  Comment: Mild elevation in immature granulocytes is non specific and   can be seen in a variety of conditions including stress response,   acute inflammation, trauma and pregnancy. Correlation with other   laboratory and clinical findings is essential.               Immature Granulocytes   0.4             IVRT               IVSd               LA WIDTH               Left Atrium Major Axis               Left Atrium Minor Axis               LA size               LA volume               LA vol index               LA Volume Index (Mod)               LVOT area               LV LATERAL E/E' RATIO               LV SEPTAL E/E' RATIO               LV EDV BP               LV Diastolic Volume Index               LVIDd               LVIDs               LV mass               LV Mass Index               Left Ventricular Outflow Tract Mean Gradient               Left Ventricular Outflow Tract Mean Velocity               LVOT diameter               LVOT peak gisselle               LVOT stroke volume               LVOT peak VTI               LV ESV BP               LV Systolic Volume Index               Lymph #   1.9             Lymph %   40.0             Magnesium    2.0             MCH   30.8             MCHC   34.5              MCV   89             Mean e'               Mono #   0.5             Mono %   9.7             MPV   10.8             MV valve area p 1/2 method               MV Peak A Zeferino               MV Peak E Zeferino               MV stenosis pressure 1/2 time               nRBC   0             Platelet Count   203             POCT Glucose 160     134   112   117       Potassium   3.4             Pulmonary Valve Mean Velocity               PV mean gradient               PV peak gradient               PV PEAK VELOCITY               Posterior Wall               RA Major Axis               Est. RA pres               RA Width               RBC   4.42             RDW   14.1             RV TB RVSP               RVDD               RVOT peak zeferino               RVOT peak VTI               Sodium   140             STJ               TAPSE               TDI SEPTAL               TDI LATERAL               Triscuspid Valve Regurgitation Peak Gradient               TR Max Zeferino               TV resting pulmonary artery pressure               WBC   4.73             ZLVIDD               ZLVIDS                                      Significant Imaging: Echocardiogram: Transthoracic echo (TTE) complete (Cupid Only):   Results for orders placed or performed during the hospital encounter of 10/07/23   Echo   Result Value Ref Range    BSA 2.01 m2    LVOT stroke volume 36.56 cm3    LVIDd 6.60 (A) 3.5 - 6.0 cm    LV Systolic Volume 189.69 mL    LV Systolic Volume Index 93.9 mL/m2    LVIDs 6.14 (A) 2.1 - 4.0 cm    LV Diastolic Volume 223.24 mL    LV Diastolic Volume Index 110.51 mL/m2    IVS 0.78 0.6 - 1.1 cm    LVOT diameter 1.90 cm    LVOT area 2.8 cm2    FS 7 (A) 28 - 44 %    Left Ventricle Relative Wall Thickness 0.26 cm    Posterior Wall 0.86 0.6 - 1.1 cm    LV mass 226.89 g    LV Mass Index 112 g/m2    MV Peak E Zeferino 0.97 m/s    TDI LATERAL 0.08 m/s    TDI SEPTAL 0.02 m/s    E/E' ratio 19.40 m/s    MV Peak A Zeefrino 0.36 m/s    TR Max Zeferino 2.59 m/s    E/A  ratio 2.69     IVRT 88.49 msec    E wave deceleration time 178.90 msec    LV SEPTAL E/E' RATIO 48.50 m/s    LV LATERAL E/E' RATIO 12.13 m/s    LVOT peak zeferino 0.73 m/s    Left Ventricular Outflow Tract Mean Velocity 0.53 cm/s    Left Ventricular Outflow Tract Mean Gradient 1.27 mmHg    LA size 4.39 cm    Left Atrium Minor Axis 6.97 cm    Left Atrium Major Axis 6.91 cm    LA volume (mod) 111.16 cm3    LA Volume Index (Mod) 55.0 mL/m2    RVDD 2.76 cm    RVOT peak VTI 19.1 cm    TAPSE 1.80 cm    RA Major Axis 5.02 cm    AV regurgitation pressure 1/2 time [id number] ms    AR Max Zeferino 2.70 m/s    AV mean gradient 3 mmHg    AV peak gradient 5 mmHg    Ao peak zeferino 1.15 m/s    Ao VTI 23.20 cm    LVOT peak VTI 12.90 cm    AV valve area 1.58 cm²    AV Velocity Ratio 0.63     AV index (prosthetic) 0.56     ALLAN by Velocity Ratio 1.80 cm²    MV stenosis pressure 1/2 time 51.88 ms    MV valve area p 1/2 method 4.24 cm2    Triscuspid Valve Regurgitation Peak Gradient 27 mmHg    PV mean gradient 2 mmHg    PV PEAK VELOCITY 0.78 m/s    PV peak gradient 2 mmHg    Pulmonary Valve Mean Velocity 0.56 m/s    RVOT peak zeferino 0.75 m/s    Ao root annulus 3.29 cm    STJ 2.90 cm    Ascending aorta 2.79 cm    Mean e' 0.05 m/s    ZLVIDS 4.11     ZLVIDD 1.03     LA Volume Index 59.0 mL/m2    LA volume 119.12 cm3    LA WIDTH 4.6 cm    RA Width 4.0 cm    TV resting pulmonary artery pressure 35 mmHg    RV TB RVSP 11 mmHg    Est. RA pres 8 mmHg    Narrative      Left Ventricle: The left ventricle is severely dilated. Wall is thinner   than normal. Severe global hypokinesis present. There is severely reduced   systolic function with a visually estimated ejection fraction of 15 - 20%.   Grade III diastolic dysfunction.    Left Atrium: Left atrium is severely dilated.    Right Ventricle: Normal right ventricular cavity size. Wall thickness   is normal. Right ventricle wall motion  is normal. Systolic function is   normal. Pacemaker lead present in  the ventricle.    Aortic Valve: There is moderate aortic regurgitation.    Mitral Valve: There is mild to moderate regurgitation.    Tricuspid Valve: There is mild regurgitation.    IVC/SVC: Intermediate venous pressure at 8 mmHg.

## 2023-10-08 NOTE — CONSULTS
"[facility] - Intensive Care ([facility])  [facility] Medicine  Consult Note    Patient Name: Radha Abbott  MRN: [id number]  Admission Date: 10/7/2023  Hospital Length of Stay: 1 days  Attending Physician: Joaquin Avalos MD   Primary Care Provider: Vasu Kong MD           Patient information was obtained from patient, past medical records and ER records.     Inpatient consult to Hospitalist  Consult performed by: Joaquin Avalos MD  Consult ordered by: Vijay Martinez NP        Subjective:     Principal Problem: Ventricular tachycardia    Chief Complaint:   Chief Complaint   Patient presents with    Pacemaker Problem     States his defibrillator shocked him 4 days ago, this morning it "buzzed". Denies chest pain or shortness of breath. [employer] pacemaker.        HPI: 62 y/o male with PMHx of CABG, CAD, HTN, HLD and defibrillator developed near syncope 4 days ago and just as he was passing out his defibrillator shocked him and he fully recovered. He did not seek help. This am he noticed a buzzing sound and came to get it checked. He has family at bedside. He is asymptomatic. Patient admitted to ICU on 10/7/23. Cardiology consulted - noted V-fib episodes on ICD interrogation, started on IV amiodarone.  ----  Patient noted with 20 beat v-tach overnight  Amiodarone IV transitioned to PO  Marginal BP this morning, BP meds held  Cardiology following      Past Medical History:   Diagnosis Date    CAD (coronary artery disease)     Diabetes mellitus     HFrEF (heart failure with reduced ejection fraction)     ICD (implantable cardioverter-defibrillator) in place     MI, old        Past Surgical History:   Procedure Laterality Date    CARDIAC SURGERY         Review of patient's allergies indicates:  No Known Allergies    No current facility-administered medications on file prior to encounter.     Current Outpatient Medications on File Prior to Encounter   Medication Sig    aspirin (ECOTRIN) 81 MG EC tablet Take 81 mg by " mouth once daily.    atorvastatin (LIPITOR) 40 MG tablet Take 40 mg by mouth.    carvediloL (COREG) 6.25 MG tablet Take 6.25 mg by mouth 2 (two) times daily.    clopidogrel (PLAVIX) 75 mg tablet Take 75 mg by mouth once daily.    fish oil-omega-3 fatty acids 300-1,000 mg capsule Take 2 g by mouth once daily.    furosemide (LASIX) 40 MG tablet Take 40 mg by mouth 2 (two) times daily.    gabapentin (NEURONTIN) 300 MG capsule Take 300 mg by mouth nightly as needed.    isosorbide dinitrate (ISORDIL) 30 MG Tab Take 20 mg by mouth 3 (three) times daily.    JARDIANCE 10 mg tablet Take 10 mg by mouth.    metformin (GLUCOPHAGE) 1000 MG tablet Take 1,000 mg by mouth 2 (two) times daily with meals.    multivitamin capsule Take 1 capsule by mouth once daily.    sacubitriL-valsartan (ENTRESTO) 24-26 mg per tablet Take 1 tablet by mouth 2 (two) times daily.    cinnamon bark 500 mg capsule Take 1,000 mg by mouth once daily.    cyanocobalamin 2000 MCG tablet Take 3,000 mcg by mouth once daily.    metOLazone (ZAROXOLYN) 5 MG tablet Take 5 mg by mouth daily as needed.     Family History    None       Tobacco Use    Smoking status: Every Day     Current packs/day: 0.50     Types: Cigarettes    Smokeless tobacco: Not on file   Substance and Sexual Activity    Alcohol use: Yes     Comment: rarely    Drug use: No    Sexual activity: Not on file     Review of Systems  Objective:     Vital Signs (Most Recent):  Temp: 98.2 °F (36.8 °C) (10/08/23 0700)  Pulse: 68 (10/08/23 0845)  Resp: (!) 26 (10/08/23 0845)  BP: 102/60 (10/08/23 0845)  SpO2: 99 % (10/08/23 0845) Vital Signs (24h Range):  Temp:  [98.2 °F (36.8 °C)-98.4 °F (36.9 °C)] 98.2 °F (36.8 °C)  Pulse:  [63-88] 68  Resp:  [14-27] 26  SpO2:  [93 %-100 %] 99 %  BP: ()/(54-74) 102/60     Weight: 81.7 kg (180 lb 1.9 oz)  Body mass index is 24.43 kg/m².     Physical Exam     Significant Labs: All pertinent labs within the past 24 hours have been reviewed.  Recent Lab Results          10/08/23  0506   10/08/23  0311   10/08/23  0033   10/07/23  1800        Anion Gap   17           Baso #   0.03           Basophil %   0.6           BUN   30           Calcium   9.6           Chloride   98           CO2   25           Creatinine   2.2           Differential Method   Automated           eGFR   33           Eos #   0.1           Eosinophil %   2.1           Glucose   152           Gran # (ANC)   2.2           Gran %   47.2           Hematocrit   39.4           Hemoglobin   13.6           Immature Grans (Abs)   0.02  Comment: Mild elevation in immature granulocytes is non specific and   can be seen in a variety of conditions including stress response,   acute inflammation, trauma and pregnancy. Correlation with other   laboratory and clinical findings is essential.             Immature Granulocytes   0.4           Lymph #   1.9           Lymph %   40.0           Magnesium    2.0           MCH   30.8           MCHC   34.5           MCV   89           Mono #   0.5           Mono %   9.7           MPV   10.8           nRBC   0           Platelet Count   203           POCT Glucose 160     134   112       Potassium   3.4           RBC   4.42           RDW   14.1           Sodium   140           WBC   4.73                   Significant Imaging: I have reviewed all pertinent imaging results/findings within the past 24 hours.    X-Ray Chest 1 View   Final Result      No acute process seen.         Electronically signed by: Pj Velarde MD   Date:    10/07/2023   Time:    10:29            Assessment/Plan:     * Ventricular tachycardia  Admitted to ICU, amiodarone IV transitioned to PO  ICD interrogation > 2000 episodes of NSVT in the last few months  Cardiology following    IVÁN (acute kidney injury)  Patient with acute kidney injury/acute renal failure likely due to workup pending IVÁN is currently stable. Baseline creatinine 1.17 per OSH epic chart review - Labs reviewed- Renal function/electrolytes with  Estimated Creatinine Clearance: 38.7 mL/min (A) (based on SCr of 2.2 mg/dL (H)). according to latest data. Monitor urine output and serial BMP and adjust therapy as needed. Avoid nephrotoxins and renally dose meds for GFR listed above.   Check UA, renal US, monitor labs    HFrEF (heart failure with reduced ejection fraction)  Compensated, BP marginal  Holding diuretics  Cardiology following    Echo    Result Date: 10/7/2023    Left Ventricle: The left ventricle is severely dilated. Wall is thinner   than normal. Severe global hypokinesis present. There is severely reduced   systolic function with a visually estimated ejection fraction of 15 - 20%.   Grade III diastolic dysfunction.    Left Atrium: Left atrium is severely dilated.    Right Ventricle: Normal right ventricular cavity size. Wall thickness   is normal. Right ventricle wall motion  is normal. Systolic function is   normal. Pacemaker lead present in the ventricle.    Aortic Valve: There is moderate aortic regurgitation.    Mitral Valve: There is mild to moderate regurgitation.    Tricuspid Valve: There is mild regurgitation.    IVC/SVC: Intermediate venous pressure at 8 mmHg.          Hypokalemia  PRN repletion orders in place  Monitor labs      DM (diabetes mellitus)  Patient's FSGs are uncontrolled due to hyperglycemia on current medication regimen.  Last A1c reviewed-   Lab Results   Component Value Date    HGBA1C 7.7 (H) 11/02/2021     Most recent fingerstick glucose reviewed-   Recent Labs   Lab 10/07/23  1800 10/08/23  0033 10/08/23  0506   POCTGLUCOSE 112* 134* 160*     Current correctional scale  Medium  Maintain anti-hyperglycemic dose as follows-   Antihyperglycemics (From admission, onward)      Start     Stop Route Frequency Ordered    10/07/23 1452  insulin aspart U-100 pen 0-10 Units         -- SubQ Every 6 hours PRN 10/07/23 1352          Hold Oral hypoglycemics while patient is in the hospital.      VTE Risk Mitigation (From admission,  onward)           Ordered     IP VTE LOW RISK PATIENT  Once         10/07/23 1352     Place sequential compression device  Until discontinued         10/07/23 1352                      Thank you for your consult. I will follow-up with patient. Please contact us if you have any additional questions.    Joaquin Avalos MD  Department of Hospital Medicine   [facility] - Intensive Care ([facility])

## 2023-10-08 NOTE — SUBJECTIVE & OBJECTIVE
Objective:     Vital Signs (Most Recent):  Temp: 98.2 °F (36.8 °C) (10/08/23 0700)  Pulse: 68 (10/08/23 0845)  Resp: (!) 26 (10/08/23 0845)  BP: 102/60 (10/08/23 0845)  SpO2: 99 % (10/08/23 0845) Vital Signs (24h Range):  Temp:  [98.2 °F (36.8 °C)-98.4 °F (36.9 °C)] 98.2 °F (36.8 °C)  Pulse:  [63-88] 68  Resp:  [14-27] 26  SpO2:  [93 %-100 %] 99 %  BP: ()/(54-74) 102/60     Weight: 81.7 kg (180 lb 1.9 oz)  Body mass index is 24.43 kg/m².      Intake/Output Summary (Last 24 hours) at 10/8/2023 1136  Last data filed at 10/8/2023 0900  Gross per 24 hour   Intake 492.08 ml   Output 450 ml   Net 42.08 ml        Physical Exam  Vitals and nursing note reviewed.   HENT:      Head: Normocephalic.   Eyes:      Pupils: Pupils are equal, round, and reactive to light.   Cardiovascular:      Rate and Rhythm: Normal rate.   Pulmonary:      Effort: Pulmonary effort is normal.   Abdominal:      General: Abdomen is flat.   Neurological:      General: No focal deficit present.      Mental Status: He is alert.           Review of Systems   Unable to perform ROS: Acuity of condition       Vents:       Lines/Drains/Airways       Peripheral Intravenous Line  Duration                  Peripheral IV - Single Lumen 10/07/23 1109 20 G Left Forearm 1 day                    Significant Labs:    CBC/Anemia Profile:  Recent Labs   Lab 10/07/23  1109 10/08/23  0311   WBC 5.01 4.73   HGB 14.4 13.6*   HCT 42.0 39.4*    203   MCV 90 89   RDW 14.2 14.1        Chemistries:  Recent Labs   Lab 10/07/23  1109 10/08/23  0311    140   K 3.7 3.4*   CL 97 98   CO2 26 25   BUN 29* 30*   CREATININE 2.2* 2.2*   CALCIUM 10.1 9.6   ALBUMIN 4.4  --    PROT 8.7*  --    BILITOT 0.5  --    ALKPHOS 74  --    ALT 22  --    AST 23  --    MG 2.2 2.0   PHOS 3.7  --        All pertinent labs within the past 24 hours have been reviewed.    Significant Imaging:  I have reviewed all pertinent imaging results/findings within the past 24 hours.

## 2023-10-08 NOTE — SUBJECTIVE & OBJECTIVE
Past Medical History:   Diagnosis Date    CAD (coronary artery disease)     Diabetes mellitus     HFrEF (heart failure with reduced ejection fraction)     ICD (implantable cardioverter-defibrillator) in place     MI, old        Past Surgical History:   Procedure Laterality Date    CARDIAC SURGERY         Review of patient's allergies indicates:  No Known Allergies    No current facility-administered medications on file prior to encounter.     Current Outpatient Medications on File Prior to Encounter   Medication Sig    aspirin (ECOTRIN) 81 MG EC tablet Take 81 mg by mouth once daily.    atorvastatin (LIPITOR) 40 MG tablet Take 40 mg by mouth.    carvediloL (COREG) 6.25 MG tablet Take 6.25 mg by mouth 2 (two) times daily.    clopidogrel (PLAVIX) 75 mg tablet Take 75 mg by mouth once daily.    fish oil-omega-3 fatty acids 300-1,000 mg capsule Take 2 g by mouth once daily.    furosemide (LASIX) 40 MG tablet Take 40 mg by mouth 2 (two) times daily.    gabapentin (NEURONTIN) 300 MG capsule Take 300 mg by mouth nightly as needed.    isosorbide dinitrate (ISORDIL) 30 MG Tab Take 20 mg by mouth 3 (three) times daily.    JARDIANCE 10 mg tablet Take 10 mg by mouth.    metformin (GLUCOPHAGE) 1000 MG tablet Take 1,000 mg by mouth 2 (two) times daily with meals.    multivitamin capsule Take 1 capsule by mouth once daily.    sacubitriL-valsartan (ENTRESTO) 24-26 mg per tablet Take 1 tablet by mouth 2 (two) times daily.    cinnamon bark 500 mg capsule Take 1,000 mg by mouth once daily.    cyanocobalamin 2000 MCG tablet Take 3,000 mcg by mouth once daily.    metOLazone (ZAROXOLYN) 5 MG tablet Take 5 mg by mouth daily as needed.     Family History    None       Tobacco Use    Smoking status: Every Day     Current packs/day: 0.50     Types: Cigarettes    Smokeless tobacco: Not on file   Substance and Sexual Activity    Alcohol use: Yes     Comment: rarely    Drug use: No    Sexual activity: Not on file     Review of  Systems  Objective:     Vital Signs (Most Recent):  Temp: 98.2 °F (36.8 °C) (10/08/23 0700)  Pulse: 68 (10/08/23 0845)  Resp: (!) 26 (10/08/23 0845)  BP: 102/60 (10/08/23 0845)  SpO2: 99 % (10/08/23 0845) Vital Signs (24h Range):  Temp:  [98.2 °F (36.8 °C)-98.4 °F (36.9 °C)] 98.2 °F (36.8 °C)  Pulse:  [63-88] 68  Resp:  [14-27] 26  SpO2:  [93 %-100 %] 99 %  BP: ()/(54-74) 102/60     Weight: 81.7 kg (180 lb 1.9 oz)  Body mass index is 24.43 kg/m².     Physical Exam     Significant Labs: All pertinent labs within the past 24 hours have been reviewed.  Recent Lab Results         10/08/23  0506   10/08/23  0311   10/08/23  0033   10/07/23  1800        Anion Gap   17           Baso #   0.03           Basophil %   0.6           BUN   30           Calcium   9.6           Chloride   98           CO2   25           Creatinine   2.2           Differential Method   Automated           eGFR   33           Eos #   0.1           Eosinophil %   2.1           Glucose   152           Gran # (ANC)   2.2           Gran %   47.2           Hematocrit   39.4           Hemoglobin   13.6           Immature Grans (Abs)   0.02  Comment: Mild elevation in immature granulocytes is non specific and   can be seen in a variety of conditions including stress response,   acute inflammation, trauma and pregnancy. Correlation with other   laboratory and clinical findings is essential.             Immature Granulocytes   0.4           Lymph #   1.9           Lymph %   40.0           Magnesium    2.0           MCH   30.8           MCHC   34.5           MCV   89           Mono #   0.5           Mono %   9.7           MPV   10.8           nRBC   0           Platelet Count   203           POCT Glucose 160     134   112       Potassium   3.4           RBC   4.42           RDW   14.1           Sodium   140           WBC   4.73                   Significant Imaging: I have reviewed all pertinent imaging results/findings within the past 24  hours.    X-Ray Chest 1 View   Final Result      No acute process seen.         Electronically signed by: Pj Velarde MD   Date:    10/07/2023   Time:    10:29

## 2023-10-08 NOTE — ASSESSMENT & PLAN NOTE
Admitted to ICU, amiodarone IV transitioned to PO  ICD interrogation > 2000 episodes of NSVT in the last few months  Cardiology following

## 2023-10-08 NOTE — ASSESSMENT & PLAN NOTE
Compensated, BP marginal  Holding diuretics  Cardiology following    Echo    Result Date: 10/7/2023    Left Ventricle: The left ventricle is severely dilated. Wall is thinner   than normal. Severe global hypokinesis present. There is severely reduced   systolic function with a visually estimated ejection fraction of 15 - 20%.   Grade III diastolic dysfunction.    Left Atrium: Left atrium is severely dilated.    Right Ventricle: Normal right ventricular cavity size. Wall thickness   is normal. Right ventricle wall motion  is normal. Systolic function is   normal. Pacemaker lead present in the ventricle.    Aortic Valve: There is moderate aortic regurgitation.    Mitral Valve: There is mild to moderate regurgitation.    Tricuspid Valve: There is mild regurgitation.    IVC/SVC: Intermediate venous pressure at 8 mmHg.

## 2023-10-08 NOTE — PROGRESS NOTES
[facility]  Critical Care Medicine  Progress Note    Patient Name: Radha Abbott  MRN: [id number]  Admission Date: 10/7/2023  Hospital Length of Stay: 1 days  Code Status: Full Code  Attending Provider: Joaquin Avalos MD  Primary Care Provider: Vasu Kong MD   Principal Problem: <principal problem not specified>    Subjective:     HPI:  63 yr old with hx of CABG, CAD, HTN, HLD and defibrillator developed near syncope 4 days ago and just as he was passing out his defibrillator shocked him and he fully recovered. He did not seek help. This am he noticed a buzzing sound and came to get it checked. He has family at bedside. He is asymptomatic.       Hospital/ICU Course:  10/8. Wants to go home. On RA. Did not sleep well. Had a couple episodes of NSVT.          Objective:     Vital Signs (Most Recent):  Temp: 98.2 °F (36.8 °C) (10/08/23 0700)  Pulse: 68 (10/08/23 0845)  Resp: (!) 26 (10/08/23 0845)  BP: 102/60 (10/08/23 0845)  SpO2: 99 % (10/08/23 0845) Vital Signs (24h Range):  Temp:  [98.2 °F (36.8 °C)-98.4 °F (36.9 °C)] 98.2 °F (36.8 °C)  Pulse:  [63-88] 68  Resp:  [14-27] 26  SpO2:  [93 %-100 %] 99 %  BP: ()/(54-74) 102/60     Weight: 81.7 kg (180 lb 1.9 oz)  Body mass index is 24.43 kg/m².      Intake/Output Summary (Last 24 hours) at 10/8/2023 1136  Last data filed at 10/8/2023 0900  Gross per 24 hour   Intake 492.08 ml   Output 450 ml   Net 42.08 ml        Physical Exam  Vitals and nursing note reviewed.   HENT:      Head: Normocephalic.   Eyes:      Pupils: Pupils are equal, round, and reactive to light.   Cardiovascular:      Rate and Rhythm: Normal rate.   Pulmonary:      Effort: Pulmonary effort is normal.   Abdominal:      General: Abdomen is flat.   Neurological:      General: No focal deficit present.      Mental Status: He is alert.           Review of Systems   Unable to perform ROS: Acuity of condition       Vents:       Lines/Drains/Airways       Peripheral  "Intravenous Line  Duration                  Peripheral IV - Single Lumen 10/07/23 1109 20 G Left Forearm 1 day                    Significant Labs:    CBC/Anemia Profile:  Recent Labs   Lab 10/07/23  1109 10/08/23  0311   WBC 5.01 4.73   HGB 14.4 13.6*   HCT 42.0 39.4*    203   MCV 90 89   RDW 14.2 14.1        Chemistries:  Recent Labs   Lab 10/07/23  1109 10/08/23  0311    140   K 3.7 3.4*   CL 97 98   CO2 26 25   BUN 29* 30*   CREATININE 2.2* 2.2*   CALCIUM 10.1 9.6   ALBUMIN 4.4  --    PROT 8.7*  --    BILITOT 0.5  --    ALKPHOS 74  --    ALT 22  --    AST 23  --    MG 2.2 2.0   PHOS 3.7  --        All pertinent labs within the past 24 hours have been reviewed.    Significant Imaging:  I have reviewed all pertinent imaging results/findings within the past 24 hours.      ABG  No results for input(s): "PH", "PO2", "PCO2", "HCO3", "BE" in the last 168 hours.  Assessment/Plan:     Cardiac/Vascular  HFrEF (heart failure with reduced ejection fraction)  Compensated  Resume home medications      CAD (coronary artery disease)  Continue home medications    Ventricular tachycardia  NSVT that did not require activation  Stable on amiodarone infusion.  Changed to PO  Stable for transfer to [facility] and then plan per cardiology     Endocrine  DM (diabetes mellitus)  Resume home meds  [facility]       Critical Care Daily Checklist:    A: Awake: RASS Goal/Actual Goal:    Actual:     B: Spontaneous Breathing Trial Performed?     C: SAT & SBT Coordinated?  n/a                      D: Delirium: CAM-ICU     E: Early Mobility Performed? Yes   F: Feeding Goal:    Status:     Current Diet Order   Procedures    Diet Cardiac [facility]     Cardiac diabetic diet     Order Specific Question:   Indicate patient location for additional diet options:     Answer:   [facility]      AS: Analgesia/Sedation n/a   T: Thromboembolic Prophylaxis n/a   H: HOB > 300 Yes   U: Stress Ulcer Prophylaxis (if needed) yes   G: Glucose Control " yes   B: Bowel Function Stool Occurrence: 1   I: Indwelling Catheter (Lines & [place]) Necessity n/a   D: De-escalation of Antimicrobials/Pharmacotherapies n/a    Plan for the day/ETD yes    Code Status:  Family/Goals of Care: Full Code  yes     Critical Care Time: 35 minutes  Critical secondary to Patient has a condition that poses threat to life and bodily function: NSVT      Critical care was time spent personally by me on the following activities: development of treatment plan with patient or surrogate and bedside caregivers, discussions with consultants, evaluation of patient's response to treatment, examination of patient, ordering and performing treatments and interventions, ordering and review of laboratory studies, ordering and review of radiographic studies, pulse oximetry, re-evaluation of patient's condition. This critical care time did not overlap with that of any other provider or involve time for any procedures.     Mg Dial MD  Critical Care Medicine  [facility] - Intensive [facility] [place])

## 2023-10-08 NOTE — HOSPITAL COURSE
No more shocks overnight.  However his monitor showed 1 episode of nonsustained V-tach for 20 beats 1:00 a.m..    10/9/23-Patient seen and examined today, sitting up in bedside chair. Feels well. No CV complaints. Denies CP/SOB. Labs reviewed. K 3.3, being repleted. Mg 2.2. Creatinine 2.4. Will need ischemic workup. EP also consulted.    10/10/23-Patient seen and examined today, sitting up on bedside couch. Feels ok. . Creatinine with upward trend. LA WNL. RHC planned today. MPI stress test initially planned but patient had caffeine this AM.    10/11/23-Patient seen and examined today, s/p RHC which showed moderate to severe pulmonary HTN, elevated LVEDP, low cardiac output. Started on milrinone overnight, feeling less SOB. No CP symptoms. Runs of PAF overnight. Creatinine 2.4. IV Lasix initiated, will assess response.     10/122/2023 SEEN AND EXAMINED .APPEARS COMFORTABLE NOT DIURESING MUCH CLINICALLY IMPROVED ON INOTROPES HOWEVER HE IS MORE PRERENAL.DISCUSSED CASE WITH [facility] WILL MAKE PLANS FOR TRANSFER.

## 2023-10-08 NOTE — ASSESSMENT & PLAN NOTE
Patient's FSGs are uncontrolled due to hyperglycemia on current medication regimen.  Last A1c reviewed-   Lab Results   Component Value Date    HGBA1C 7.7 (H) 11/02/2021     Most recent fingerstick glucose reviewed-   Recent Labs   Lab 10/07/23  1800 10/08/23  0033 10/08/23  0506   POCTGLUCOSE 112* 134* 160*     Current correctional scale  Medium  Maintain anti-hyperglycemic dose as follows-   Antihyperglycemics (From admission, onward)    Start     Stop Route Frequency Ordered    10/07/23 1452  insulin aspart U-100 pen 0-10 Units         -- SubQ Every 6 hours PRN 10/07/23 1352        Hold Oral hypoglycemics while patient is in the hospital.

## 2023-10-09 ENCOUNTER — CLINICAL SUPPORT (OUTPATIENT)
Dept: SMOKING CESSATION | Facility: CLINIC | Age: 63
End: 2023-10-09
Payer: COMMERCIAL

## 2023-10-09 DIAGNOSIS — F17.200 NICOTINE DEPENDENCE: Primary | ICD-10-CM

## 2023-10-09 LAB
ANION GAP SERPL CALC-SCNC: 14 MMOL/L (ref 8–16)
BASOPHILS # BLD AUTO: 0.04 K/UL (ref 0–0.2)
BASOPHILS NFR BLD: 1 % (ref 0–1.9)
BUN SERPL-MCNC: 35 MG/DL (ref 8–23)
CALCIUM SERPL-MCNC: 9 MG/DL (ref 8.7–10.5)
CHLORIDE SERPL-SCNC: 99 MMOL/L (ref 95–110)
CO2 SERPL-SCNC: 25 MMOL/L (ref 23–29)
CREAT SERPL-MCNC: 2.4 MG/DL (ref 0.5–1.4)
DIFFERENTIAL METHOD: ABNORMAL
EOSINOPHIL # BLD AUTO: 0.1 K/UL (ref 0–0.5)
EOSINOPHIL NFR BLD: 2.1 % (ref 0–8)
ERYTHROCYTE [DISTWIDTH] IN BLOOD BY AUTOMATED COUNT: 13.8 % (ref 11.5–14.5)
EST. GFR  (NO RACE VARIABLE): 30 ML/MIN/1.73 M^2
GLUCOSE SERPL-MCNC: 171 MG/DL (ref 70–110)
HCT VFR BLD AUTO: 39.1 % (ref 40–54)
HGB BLD-MCNC: 13.6 G/DL (ref 14–18)
IMM GRANULOCYTES # BLD AUTO: 0.01 K/UL (ref 0–0.04)
IMM GRANULOCYTES NFR BLD AUTO: 0.3 % (ref 0–0.5)
LYMPHOCYTES # BLD AUTO: 1.1 K/UL (ref 1–4.8)
LYMPHOCYTES NFR BLD: 28.4 % (ref 18–48)
MAGNESIUM SERPL-MCNC: 2.2 MG/DL (ref 1.6–2.6)
MCH RBC QN AUTO: 30.8 PG (ref 27–31)
MCHC RBC AUTO-ENTMCNC: 34.8 G/DL (ref 32–36)
MCV RBC AUTO: 89 FL (ref 82–98)
MONOCYTES # BLD AUTO: 0.5 K/UL (ref 0.3–1)
MONOCYTES NFR BLD: 11.7 % (ref 4–15)
NEUTROPHILS # BLD AUTO: 2.2 K/UL (ref 1.8–7.7)
NEUTROPHILS NFR BLD: 56.5 % (ref 38–73)
NRBC BLD-RTO: 0 /100 WBC
PLATELET # BLD AUTO: 170 K/UL (ref 150–450)
PMV BLD AUTO: 10 FL (ref 9.2–12.9)
POCT GLUCOSE: 156 MG/DL (ref 70–110)
POCT GLUCOSE: 156 MG/DL (ref 70–110)
POCT GLUCOSE: 172 MG/DL (ref 70–110)
POCT GLUCOSE: 181 MG/DL (ref 70–110)
POCT GLUCOSE: 213 MG/DL (ref 70–110)
POTASSIUM SERPL-SCNC: 3.3 MMOL/L (ref 3.5–5.1)
RBC # BLD AUTO: 4.41 M/UL (ref 4.6–6.2)
SODIUM SERPL-SCNC: 138 MMOL/L (ref 136–145)
TROPONIN I SERPL DL<=0.01 NG/ML-MCNC: 0.02 NG/ML (ref 0–0.03)
WBC # BLD AUTO: 3.84 K/UL (ref 3.9–12.7)

## 2023-10-09 PROCEDURE — 99232 PR SUBSEQUENT HOSPITAL CARE,LEVL II: ICD-10-PCS | Mod: ,,, | Performed by: PHYSICIAN ASSISTANT

## 2023-10-09 PROCEDURE — 80048 BASIC METABOLIC PNL TOTAL CA: CPT | Performed by: NURSE PRACTITIONER

## 2023-10-09 PROCEDURE — 99232 SBSQ HOSP IP/OBS MODERATE 35: CPT | Mod: ,,, | Performed by: PHYSICIAN ASSISTANT

## 2023-10-09 PROCEDURE — 83735 ASSAY OF MAGNESIUM: CPT | Performed by: NURSE PRACTITIONER

## 2023-10-09 PROCEDURE — 99406 PT REFUSED TOBACCO CESSATION: ICD-10-PCS | Mod: S$GLB,,,

## 2023-10-09 PROCEDURE — 25000003 PHARM REV CODE 250: Performed by: INTERNAL MEDICINE

## 2023-10-09 PROCEDURE — 25000003 PHARM REV CODE 250: Performed by: NURSE PRACTITIONER

## 2023-10-09 PROCEDURE — 85025 COMPLETE CBC W/AUTO DIFF WBC: CPT | Performed by: NURSE PRACTITIONER

## 2023-10-09 PROCEDURE — 36415 COLL VENOUS BLD VENIPUNCTURE: CPT | Performed by: NURSE PRACTITIONER

## 2023-10-09 PROCEDURE — 36415 COLL VENOUS BLD VENIPUNCTURE: CPT | Performed by: PHYSICIAN ASSISTANT

## 2023-10-09 PROCEDURE — 84484 ASSAY OF TROPONIN QUANT: CPT | Performed by: PHYSICIAN ASSISTANT

## 2023-10-09 PROCEDURE — 63600175 PHARM REV CODE 636 W HCPCS: Performed by: NURSE PRACTITIONER

## 2023-10-09 PROCEDURE — 11000001 HC ACUTE MED/SURG PRIVATE ROOM

## 2023-10-09 PROCEDURE — 21400001 HC TELEMETRY ROOM

## 2023-10-09 PROCEDURE — 99406 BEHAV CHNG SMOKING 3-10 MIN: CPT | Mod: S$GLB,,,

## 2023-10-09 RX ADMIN — INSULIN ASPART 1 UNITS: 100 INJECTION, SOLUTION INTRAVENOUS; SUBCUTANEOUS at 09:10

## 2023-10-09 RX ADMIN — AMIODARONE HYDROCHLORIDE 400 MG: 200 TABLET ORAL at 09:10

## 2023-10-09 RX ADMIN — POTASSIUM BICARBONATE 25 MEQ: 978 TABLET, EFFERVESCENT ORAL at 11:10

## 2023-10-09 RX ADMIN — METOPROLOL SUCCINATE 25 MG: 25 TABLET, EXTENDED RELEASE ORAL at 09:10

## 2023-10-09 RX ADMIN — CLOPIDOGREL BISULFATE 75 MG: 75 TABLET ORAL at 09:10

## 2023-10-09 RX ADMIN — MUPIROCIN: 20 OINTMENT TOPICAL at 09:10

## 2023-10-09 RX ADMIN — FAMOTIDINE 20 MG: 20 TABLET ORAL at 09:10

## 2023-10-09 RX ADMIN — INSULIN ASPART 2 UNITS: 100 INJECTION, SOLUTION INTRAVENOUS; SUBCUTANEOUS at 05:10

## 2023-10-09 RX ADMIN — ATORVASTATIN CALCIUM 40 MG: 40 TABLET, FILM COATED ORAL at 09:10

## 2023-10-09 RX ADMIN — ASPIRIN 81 MG: 81 TABLET, COATED ORAL at 09:10

## 2023-10-09 RX ADMIN — INSULIN ASPART 4 UNITS: 100 INJECTION, SOLUTION INTRAVENOUS; SUBCUTANEOUS at 11:10

## 2023-10-09 RX ADMIN — INSULIN ASPART 1 UNITS: 100 INJECTION, SOLUTION INTRAVENOUS; SUBCUTANEOUS at 12:10

## 2023-10-09 NOTE — SUBJECTIVE & OBJECTIVE
Interval History:  Patient feeling much better.  Seen with family in the room.  He has no complaints his anxious to go home.  Explained to him need to follow up with Cardiology and EP prior to going home.    Review of Systems   HENT:  Negative for sinus pain.    Respiratory:  Negative for chest tightness.    Cardiovascular:  Negative for chest pain, palpitations and leg swelling.   Gastrointestinal:  Negative for abdominal distention.   Neurological:  Negative for weakness.   All other systems reviewed and are negative.    Objective:     Vital Signs (Most Recent):  Temp: 97.8 °F (36.6 °C) (10/09/23 1032)  Pulse: 70 (10/09/23 1712)  Resp: 18 (10/09/23 1032)  BP: 114/67 (10/09/23 1032)  SpO2: 97 % (10/09/23 1032) Vital Signs (24h Range):  Temp:  [97.8 °F (36.6 °C)-98.6 °F (37 °C)] 97.8 °F (36.6 °C)  Pulse:  [64-79] 70  Resp:  [15-23] 18  SpO2:  [95 %-100 %] 97 %  BP: ()/(54-73) 114/67     Weight: 81.7 kg (180 lb 1.9 oz)  Body mass index is 24.43 kg/m².    Intake/Output Summary (Last 24 hours) at 10/9/2023 1825  Last data filed at 10/9/2023 1714  Gross per 24 hour   Intake 1240 ml   Output 450 ml   Net 790 ml         Physical Exam  Vitals reviewed.   Constitutional:       Appearance: Normal appearance.   HENT:      Head: Normocephalic and atraumatic.      Mouth/Throat:      Mouth: Mucous membranes are moist.      Pharynx: Oropharynx is clear.   Eyes:      Extraocular Movements: Extraocular movements intact.      Conjunctiva/sclera: Conjunctivae normal.   Cardiovascular:      Rate and Rhythm: Normal rate and regular rhythm.      Pulses: Normal pulses.      Heart sounds: Murmur heard.   Pulmonary:      Effort: Pulmonary effort is normal.      Breath sounds: Normal breath sounds.   Abdominal:      General: Bowel sounds are normal.      Palpations: Abdomen is soft.   Musculoskeletal:         General: Normal range of motion.      Cervical back: Normal range of motion and neck supple.   Skin:     General: Skin is warm  and dry.   Neurological:      General: No focal deficit present.      Mental Status: He is alert and oriented to person, place, and time. Mental status is at baseline.   Psychiatric:         Mood and Affect: Mood normal.         Behavior: Behavior normal.         Thought Content: Thought content normal.             Significant Labs: All pertinent labs within the past 24 hours have been reviewed.  CBC:   Recent Labs   Lab 10/08/23  0311 10/09/23  0335   WBC 4.73 3.84*   HGB 13.6* 13.6*   HCT 39.4* 39.1*    170     CMP:   Recent Labs   Lab 10/08/23  0311 10/09/23  0335    138   K 3.4* 3.3*   CL 98 99   CO2 25 25   * 171*   BUN 30* 35*   CREATININE 2.2* 2.4*   CALCIUM 9.6 9.0   ANIONGAP 17* 14       Significant Imaging: I have reviewed all pertinent imaging results/findings within the past 24 hours.

## 2023-10-09 NOTE — SUBJECTIVE & OBJECTIVE
Review of Systems   Constitutional: Negative.   HENT: Negative.     Eyes: Negative.    Cardiovascular: Negative.    Respiratory: Negative.     Endocrine: Negative.    Hematologic/Lymphatic: Negative.    Skin: Negative.    Musculoskeletal: Negative.    Gastrointestinal: Negative.    Genitourinary: Negative.    Neurological: Negative.    Psychiatric/Behavioral: Negative.     Allergic/Immunologic: Negative.      Objective:     Vital Signs (Most Recent):  Temp: 97.8 °F (36.6 °C) (10/09/23 1032)  Pulse: 72 (10/09/23 1120)  Resp: 18 (10/09/23 1032)  BP: 114/67 (10/09/23 1032)  SpO2: 97 % (10/09/23 1032) Vital Signs (24h Range):  Temp:  [97.6 °F (36.4 °C)-98.6 °F (37 °C)] 97.8 °F (36.6 °C)  Pulse:  [67-79] 72  Resp:  [15-23] 18  SpO2:  [95 %-100 %] 97 %  BP: ()/(54-73) 114/67     Weight: 81.7 kg (180 lb 1.9 oz)  Body mass index is 24.43 kg/m².     SpO2: 97 %         Intake/Output Summary (Last 24 hours) at 10/9/2023 1338  Last data filed at 10/9/2023 1118  Gross per 24 hour   Intake 1240 ml   Output 350 ml   Net 890 ml       Lines/Drains/Airways       Peripheral Intravenous Line  Duration                  Peripheral IV - Single Lumen 10/07/23 1109 20 G Left Forearm 2 days                       Physical Exam  Vitals and nursing note reviewed.   Constitutional:       General: He is not in acute distress.     Appearance: Normal appearance. He is well-developed. He is not diaphoretic.   HENT:      Head: Normocephalic and atraumatic.   Eyes:      General:         Right eye: No discharge.         Left eye: No discharge.      Pupils: Pupils are equal, round, and reactive to light.   Neck:      Thyroid: No thyromegaly.      Vascular: No JVD.      Trachea: No tracheal deviation.   Cardiovascular:      Rate and Rhythm: Normal rate and regular rhythm. Extrasystoles (PVC's) are present.     Heart sounds: Normal heart sounds, S1 normal and S2 normal. No murmur heard.     Comments: AICD site well-healed  Pulmonary:      Effort:  "Pulmonary effort is normal. No respiratory distress.      Breath sounds: Normal breath sounds. No wheezing or rales.   Abdominal:      General: There is no distension.      Palpations: Abdomen is soft.      Tenderness: There is no rebound.   Musculoskeletal:      Cervical back: Neck supple.      Right lower leg: No edema.      Left lower leg: No edema.   Skin:     General: Skin is warm and dry.      Findings: No erythema.   Neurological:      General: No focal deficit present.      Mental Status: He is alert and oriented to person, place, and time.   Psychiatric:         Mood and Affect: Mood normal.         Behavior: Behavior normal.         Thought Content: Thought content normal.            Significant Labs: CMP   Recent Labs   Lab 10/08/23  0311 10/09/23  0335    138   K 3.4* 3.3*   CL 98 99   CO2 25 25   * 171*   BUN 30* 35*   CREATININE 2.2* 2.4*   CALCIUM 9.6 9.0   ANIONGAP 17* 14   , CBC   Recent Labs   Lab 10/08/23  0311 10/09/23  0335   WBC 4.73 3.84*   HGB 13.6* 13.6*   HCT 39.4* 39.1*    170   , Troponin No results for input(s): "TROPONINI" in the last 48 hours., and All pertinent lab results from the last 24 hours have been reviewed.    Significant Imaging: Echocardiogram: Transthoracic echo (TTE) complete (Cupid Only):   Results for orders placed or performed during the hospital encounter of 10/07/23   Echo   Result Value Ref Range    BSA 2.01 m2    LVOT stroke volume 36.56 cm3    LVIDd 6.60 (A) 3.5 - 6.0 cm    LV Systolic Volume 189.69 mL    LV Systolic Volume Index 93.9 mL/m2    LVIDs 6.14 (A) 2.1 - 4.0 cm    LV Diastolic Volume 223.24 mL    LV Diastolic Volume Index 110.51 mL/m2    IVS 0.78 0.6 - 1.1 cm    LVOT diameter 1.90 cm    LVOT area 2.8 cm2    FS 7 (A) 28 - 44 %    Left Ventricle Relative Wall Thickness 0.26 cm    Posterior Wall 0.86 0.6 - 1.1 cm    LV mass 226.89 g    LV Mass Index 112 g/m2    MV Peak E Zeferino 0.97 m/s    TDI LATERAL 0.08 m/s    TDI SEPTAL 0.02 m/s    E/E' " ratio 19.40 m/s    MV Peak A Zeferino 0.36 m/s    TR Max Zeferino 2.59 m/s    E/A ratio 2.69     IVRT 88.49 msec    E wave deceleration time 178.90 msec    LV SEPTAL E/E' RATIO 48.50 m/s    LV LATERAL E/E' RATIO 12.13 m/s    LVOT peak zeferino 0.73 m/s    Left Ventricular Outflow Tract Mean Velocity 0.53 cm/s    Left Ventricular Outflow Tract Mean Gradient 1.27 mmHg    LA size 4.39 cm    Left Atrium Minor Axis 6.97 cm    Left Atrium Major Axis 6.91 cm    LA volume (mod) 111.16 cm3    LA Volume Index (Mod) 55.0 mL/m2    RVDD 2.76 cm    RVOT peak VTI 19.1 cm    TAPSE 1.80 cm    RA Major Axis 5.02 cm    AV regurgitation pressure 1/2 time [id number] ms    AR Max Zeferino 2.70 m/s    AV mean gradient 3 mmHg    AV peak gradient 5 mmHg    Ao peak zeferino 1.15 m/s    Ao VTI 23.20 cm    LVOT peak VTI 12.90 cm    AV valve area 1.58 cm²    AV Velocity Ratio 0.63     AV index (prosthetic) 0.56     ALLAN by Velocity Ratio 1.80 cm²    MV stenosis pressure 1/2 time 51.88 ms    MV valve area p 1/2 method 4.24 cm2    Triscuspid Valve Regurgitation Peak Gradient 27 mmHg    PV mean gradient 2 mmHg    PV PEAK VELOCITY 0.78 m/s    PV peak gradient 2 mmHg    Pulmonary Valve Mean Velocity 0.56 m/s    RVOT peak zeferino 0.75 m/s    Ao root annulus 3.29 cm    STJ 2.90 cm    Ascending aorta 2.79 cm    Mean e' 0.05 m/s    ZLVIDS 4.11     ZLVIDD 1.03     LA Volume Index 59.0 mL/m2    LA volume 119.12 cm3    LA WIDTH 4.6 cm    RA Width 4.0 cm    TV resting pulmonary artery pressure 35 mmHg    RV TB RVSP 11 mmHg    Est. RA pres 8 mmHg    Narrative      Left Ventricle: The left ventricle is severely dilated. Wall is thinner   than normal. Severe global hypokinesis present. There is severely reduced   systolic function with a visually estimated ejection fraction of 15 - 20%.   Grade III diastolic dysfunction.    Left Atrium: Left atrium is severely dilated.    Right Ventricle: Normal right ventricular cavity size. Wall thickness   is normal. Right ventricle wall motion   is normal. Systolic function is   normal. Pacemaker lead present in the ventricle.    Aortic Valve: There is moderate aortic regurgitation.    Mitral Valve: There is mild to moderate regurgitation.    Tricuspid Valve: There is mild regurgitation.    IVC/SVC: Intermediate venous pressure at 8 mmHg.     , EKG: Reviewed, and X-Ray: CXR: X-Ray Chest 1 View (CXR):   Results for orders placed or performed during the hospital encounter of 10/07/23   X-Ray Chest 1 View    Narrative    EXAMINATION:  XR CHEST 1 VIEW    CLINICAL HISTORY:  Encounter for adjustment and management of automatic implantable cardiac defibrillator    FINDINGS:  Single view of the chest.  Comparison 02/07/2018.  Left-sided generator and defibrillator wires demonstrates similar configuration.    Cardiac silhouette is normal.  The lungs demonstrate no evidence of active disease.  No evidence of pleural effusion or pneumothorax.  Bones appear intact.  Moderate degenerative changes and moderate atherosclerotic disease.  Sternotomy wires are intact.      Impression    No acute process seen.      Electronically signed by: Pj Velarde MD  Date:    10/07/2023  Time:    10:29    and X-Ray Chest PA and Lateral (CXR): No results found for this visit on 10/07/23.

## 2023-10-09 NOTE — PLAN OF CARE
[facility]  Initial Discharge Assessment       Primary Care Provider: Vasu Kong MD    Admission Diagnosis: Defibrillator discharge [Z45.02]  Ventricular fibrillation [I49.01]    Admission Date: 10/7/2023  Expected Discharge Date:     Transition of Care Barriers: None    Payor: MEDICAID / Plan: [facility] COMMUNITY PLAN [facility] ([place] MEDICAID) / Product Type: Managed Medicaid /     Extended Emergency Contact Information  Primary Emergency Contact: EDGAR ZENDEJAS  Mobile Phone: [phone]  Relation: Spouse  Preferred language: English   needed? No    Discharge Plan A: Home with family         [id number] - [place], [place] - [address]  [address]  [place] [address]  Phone: [phone] Fax: [phone]      Initial Assessment (most recent)       Adult Discharge Assessment - 10/09/23 1051          Discharge Assessment    Assessment Type Discharge Planning Assessment     Confirmed/corrected address, phone number and insurance Yes     Confirmed Demographics Correct on Facesheet     Source of Information patient;family     Communicated STANLEY with patient/caregiver Date not available/Unable to determine     Reason For Admission Ventricular tachycardia     People in Home spouse     Facility Arrived From: home     Do you expect to return to your current living situation? Yes     Do you have help at home or someone to help you manage your care at home? Yes     Who are your caregiver(s) and their phone number(s)? SpouseEdgar     Prior to hospitilization cognitive status: Alert/Oriented     Current cognitive status: Alert/Oriented     Home Accessibility wheelchair accessible     Home Layout Able to live on 1st floor     Equipment Currently Used at Home none     Readmission within 30 days? No     Patient currently being followed by outpatient case management? No     Do you currently have service(s) that help you manage your care at home? No     Do you take prescription medications?  Yes     Do you have prescription coverage? Yes     Coverage medicaid     Do you have any problems affording any of your prescribed medications? No     Is the patient taking medications as prescribed? yes     Who is going to help you get home at discharge? spouse, Arlyn     How do you get to doctors appointments? car, drives self;family or friend will provide     Are you on dialysis? No     Do you take coumadin? No     DME Needed Upon Discharge  none     Discharge Plan discussed with: Patient;Spouse/sig other     Transition of Care Barriers None     Discharge Plan A Home with family                   Anticipated DC dispo: home with family   Prior Level of Function: independent with ADLs   People in home: spouseArlyn      Comments:  CM met with patient at bedside to introduce role and discuss discharge planning. Spouse will be help at home and can provide transport at time of discharge. Patient does not use any DME at home. Confirmed demographics, insurance, and emergency contacts. CM discharge needs depends on hospital progress. CM will continue following to assist with other needs.

## 2023-10-09 NOTE — ASSESSMENT & PLAN NOTE
Admitted to ICU, amiodarone IV transitioned to PO  ICD interrogation > 2000 episodes of NSVT in the last few months  Cardiology following consulted EP for wait to see recs in a.m.

## 2023-10-09 NOTE — ASSESSMENT & PLAN NOTE
Continue with beta-blocker from home.    Start IV amiodarone for 24 hours and then switch to 400 mg b.i.d for 1 week then 200 mg daily.  ICD interrogation reviewed.  Does not appear to have lead issues, sometimes ICD vibration happens if there is a lead fracture.    Chest x-ray does not show any lead fracture.  Battery life 3.5 years  Reviewed his ICD interrogation lead impedance is good    10.8.2023  Reviewed his 10 events.  He had 20 beats run of V-tach at 1:00 a.m..  Review of his ICD interrogation showed more than 2000 episodes of nonsustained V-tach in the last few months.  He is on carvedilol at home.  Currently blood pressure running low for resume and his carvedilol.  Once BP allows will start him on low-dose Toprol 25 mg.    We will hold his isosorbide for now and the Lasix  Will amiodarone IV to p.o. 400 mg b.i.d. for 1 week then 200 mg daily.    10/9/23  -Stable overnight, occasional PVC's noted on monitor this AM  -Continue amiodarone  -Continue Toprol XL  -Replete K > 4, keep Mg > 2  -EP consulted

## 2023-10-09 NOTE — ASSESSMENT & PLAN NOTE
Pain-free, troponin likely elevated secondary to VFib with defibrillation  We will need ischemic workup sometime in the future however with elevated creatinine we will have to discuss with Cardiology timing  Continue current medication of aspirin Plavix beta-blocker and statin

## 2023-10-09 NOTE — CARE UPDATE
Nurse reports BP dropped to 89/54 earlier when both Amiodarone and Isosorbide given at 0842. Previous dose of Isosorbide at 1400 was held. Currently, /73. Will d/c Isosorbide for now, continue Amiodarone

## 2023-10-09 NOTE — PROGRESS NOTES
Patient is not ready to quit smoking.  Patient refuses nicotine patch and smoking cessation appointment at this time.

## 2023-10-09 NOTE — ASSESSMENT & PLAN NOTE
Chest pain-free.  Troponin elevation possibly due to his VFib shocked.  Continue home medications.  Check magnesium and electrolytes trend trop    10/9/23  -Repeat troponin pending  -Continue OMT-ASA, Plavix, BB, statin  -Will need ischemic workup

## 2023-10-09 NOTE — ASSESSMENT & PLAN NOTE
Patient's FSGs are uncontrolled due to hyperglycemia on current medication regimen.  Last A1c reviewed-   Lab Results   Component Value Date    HGBA1C 7.7 (H) 11/02/2021     Most recent fingerstick glucose reviewed-   Recent Labs   Lab 10/09/23  0006 10/09/23  0538 10/09/23  1120 10/09/23  1712   POCTGLUCOSE 172* 156* 213* 181*     Current correctional scale  Medium  Maintain anti-hyperglycemic dose as follows-   Antihyperglycemics (From admission, onward)    Start     Stop Route Frequency Ordered    10/07/23 1452  insulin aspart U-100 pen 0-10 Units         -- SubQ Every 6 hours PRN 10/07/23 1352        Hold Oral hypoglycemics while patient is in the hospital.

## 2023-10-09 NOTE — PLAN OF CARE
Patient is stable. No signs or symptoms of acute distress. Continuous cardiac monitoring in place. Glucose monitoring with insulin coverage as needed. Bed in lowest position, call light in reach. Chart orders reviewed.

## 2023-10-09 NOTE — ASSESSMENT & PLAN NOTE
-Continue home meds.  Euvolemic    10/9/23  -Continue Toprol XL  -Entresto held due to bumped creatinine

## 2023-10-09 NOTE — PROGRESS NOTES
[facility] Medicine  Progress Note    Patient Name: Radha Abbott  MRN: [id number]  Patient Class: IP- Inpatient   Admission Date: 10/7/2023  Length of Stay: 2 days  Attending Physician: Mery Calvo MD  Primary Care Provider: Vasu Kong MD        Subjective:     Principal Problem:Ventricular tachycardia        HPI:  62 y/o male with PMHx of CABG, CAD, HTN, HLD and defibrillator developed near syncope 4 days ago and just as he was passing out his defibrillator shocked him and he fully recovered. He did not seek help. This am he noticed a buzzing sound and came to get it checked. He has family at bedside. He is asymptomatic. Patient admitted to ICU on 10/7/23. Cardiology consulted - noted V-fib episodes on ICD interrogation, started on IV amiodarone.  ----  Patient noted with 20 beat v-tach overnight  Amiodarone IV transitioned to PO  Marginal BP this morning, BP meds held  Cardiology following      Overview/Hospital Course:  No notes on file    Interval History:  Patient feeling much better.  Seen with family in the room.  He has no complaints his anxious to go home.  Explained to him need to follow up with Cardiology and EP prior to going home.    Review of Systems   HENT:  Negative for sinus pain.    Respiratory:  Negative for chest tightness.    Cardiovascular:  Negative for chest pain, palpitations and leg swelling.   Gastrointestinal:  Negative for abdominal distention.   Neurological:  Negative for weakness.   All other systems reviewed and are negative.    Objective:     Vital Signs (Most Recent):  Temp: 97.8 °F (36.6 °C) (10/09/23 1032)  Pulse: 70 (10/09/23 1712)  Resp: 18 (10/09/23 1032)  BP: 114/67 (10/09/23 1032)  SpO2: 97 % (10/09/23 1032) Vital Signs (24h Range):  Temp:  [97.8 °F (36.6 °C)-98.6 °F (37 °C)] 97.8 °F (36.6 °C)  Pulse:  [64-79] 70  Resp:  [15-23] 18  SpO2:  [95 %-100 %] 97 %  BP: ()/(54-73) 114/67     Weight: 81.7 kg (180 lb 1.9 oz)  Body mass index is  24.43 kg/m².    Intake/Output Summary (Last 24 hours) at 10/9/2023 1825  Last data filed at 10/9/2023 1714  Gross per 24 hour   Intake 1240 ml   Output 450 ml   Net 790 ml         Physical Exam  Vitals reviewed.   Constitutional:       Appearance: Normal appearance.   HENT:      Head: Normocephalic and atraumatic.      Mouth/Throat:      Mouth: Mucous membranes are moist.      Pharynx: Oropharynx is clear.   Eyes:      Extraocular Movements: Extraocular movements intact.      Conjunctiva/sclera: Conjunctivae normal.   Cardiovascular:      Rate and Rhythm: Normal rate and regular rhythm.      Pulses: Normal pulses.      Heart sounds: Murmur heard.   Pulmonary:      Effort: Pulmonary effort is normal.      Breath sounds: Normal breath sounds.   Abdominal:      General: Bowel sounds are normal.      Palpations: Abdomen is soft.   Musculoskeletal:         General: Normal range of motion.      Cervical back: Normal range of motion and neck supple.   Skin:     General: Skin is warm and dry.   Neurological:      General: No focal deficit present.      Mental Status: He is alert and oriented to person, place, and time. Mental status is at baseline.   Psychiatric:         Mood and Affect: Mood normal.         Behavior: Behavior normal.         Thought Content: Thought content normal.             Significant Labs: All pertinent labs within the past 24 hours have been reviewed.  CBC:   Recent Labs   Lab 10/08/23  0311 10/09/23  0335   WBC 4.73 3.84*   HGB 13.6* 13.6*   HCT 39.4* 39.1*    170     CMP:   Recent Labs   Lab 10/08/23  0311 10/09/23  0335    138   K 3.4* 3.3*   CL 98 99   CO2 25 25   * 171*   BUN 30* 35*   CREATININE 2.2* 2.4*   CALCIUM 9.6 9.0   ANIONGAP 17* 14       Significant Imaging: I have reviewed all pertinent imaging results/findings within the past 24 hours.      Assessment/Plan:      * Ventricular tachycardia  Admitted to ICU, amiodarone IV transitioned to PO  ICD interrogation > 2000  episodes of NSVT in the last few months  Cardiology following consulted EP for wait to see recs in a.m.    IVÁN (acute kidney injury)  Patient with acute kidney injury/acute renal failure likely due to workup pending IVÁN is currently stable. Baseline creatinine 1.17 per OSH epic chart review - Labs reviewed- Renal function/electrolytes with Estimated Creatinine Clearance: 38.7 mL/min (A) (based on SCr of 2.2 mg/dL (H)). according to latest data. Monitor urine output and serial BMP and adjust therapy as needed. Avoid nephrotoxins and renally dose meds for GFR listed above.   Check UA  Renal ultrasound unremarkable    Hypokalemia  PRN repletion orders in place  Monitor labs      DM (diabetes mellitus)  Patient's FSGs are uncontrolled due to hyperglycemia on current medication regimen.  Last A1c reviewed-   Lab Results   Component Value Date    HGBA1C 7.7 (H) 11/02/2021     Most recent fingerstick glucose reviewed-   Recent Labs   Lab 10/09/23  0006 10/09/23  0538 10/09/23  1120 10/09/23  1712   POCTGLUCOSE 172* 156* 213* 181*     Current correctional scale  Medium  Maintain anti-hyperglycemic dose as follows-   Antihyperglycemics (From admission, onward)    Start     Stop Route Frequency Ordered    10/07/23 1452  insulin aspart U-100 pen 0-10 Units         -- SubQ Every 6 hours PRN 10/07/23 1352        Hold Oral hypoglycemics while patient is in the hospital.    HFrEF (heart failure with reduced ejection fraction)  Compensated, BP marginal  Holding diuretics  Cardiology following    Echo    Result Date: 10/7/2023    Left Ventricle: The left ventricle is severely dilated. Wall is thinner   than normal. Severe global hypokinesis present. There is severely reduced   systolic function with a visually estimated ejection fraction of 15 - 20%.   Grade III diastolic dysfunction.    Left Atrium: Left atrium is severely dilated.    Right Ventricle: Normal right ventricular cavity size. Wall thickness   is normal. Right  ventricle wall motion  is normal. Systolic function is   normal. Pacemaker lead present in the ventricle.    Aortic Valve: There is moderate aortic regurgitation.    Mitral Valve: There is mild to moderate regurgitation.    Tricuspid Valve: There is mild regurgitation.    IVC/SVC: Intermediate venous pressure at 8 mmHg.          CAD (coronary artery disease)    Pain-free, troponin likely elevated secondary to VFib with defibrillation  We will need ischemic workup sometime in the future however with elevated creatinine we will have to discuss with Cardiology timing  Continue current medication of aspirin Plavix beta-blocker and statin      VTE Risk Mitigation (From admission, onward)         Ordered     IP VTE LOW RISK PATIENT  Once         10/07/23 1352     Place sequential compression device  Until discontinued         10/07/23 1352                Discharge Planning   STANLEY:      Code Status: Full Code   Is the patient medically ready for discharge?:     Reason for patient still in hospital (select all that apply): Patient trending condition and Consult recommendations  Discharge Plan A: Home with family                  Mery Randall MD  Department of Hospital Medicine   [facility]

## 2023-10-09 NOTE — PROGRESS NOTES
[facility]  Cardiology  Progress Note    Patient Name: Radha Abbott  MRN: [id number]  Admission Date: 10/7/2023  Hospital Length of Stay: 2 days  Code Status: Full Code   Attending Physician: Mery Calvo MD   Primary Care Physician: Vasu Kong MD  Expected Discharge Date:   Principal Problem:Ventricular tachycardia    Subjective:   HPI:  63 yr old with hx of CABG, CAD, HTN, HLD and defibrillator developed near syncope 4 days ago and just as he was passing out his defibrillator shocked him and he fully recovered. He did not seek help. This am he noticed a buzzing sound and came to get it checked. He has family at bedside. He is asymptomatic.         Currently feels well.  Will start him on amiodarone.  Reviewed his VFib episodes scan in the system.  He has a remote history of CABG back in 2009 since then with congestive heart failure.  States that he had his ICD done at that time it is a [employer] device.  Denies any chest pain or dyspnea at the moment.            Hospital Course:   No more shocks overnight.  However his monitor showed 1 episode of nonsustained V-tach for 20 beats 1:00 a.m..    10/9/23-Patient seen and examined today, sitting up in bedside chair. Feels well. No CV complaints. Denies CP/SOB. Labs reviewed. K 3.3, being repleted. Mg 2.2. Creatinine 2.4. Will need ischemic workup. EP also consulted.        Review of Systems   Constitutional: Negative.   HENT: Negative.     Eyes: Negative.    Cardiovascular: Negative.    Respiratory: Negative.     Endocrine: Negative.    Hematologic/Lymphatic: Negative.    Skin: Negative.    Musculoskeletal: Negative.    Gastrointestinal: Negative.    Genitourinary: Negative.    Neurological: Negative.    Psychiatric/Behavioral: Negative.     Allergic/Immunologic: Negative.      Objective:     Vital Signs (Most Recent):  Temp: 97.8 °F (36.6 °C) (10/09/23 1032)  Pulse: 72 (10/09/23 1120)  Resp: 18 (10/09/23 1032)  BP: 114/67 (10/09/23 1032)  SpO2:  97 % (10/09/23 1032) Vital Signs (24h Range):  Temp:  [97.6 °F (36.4 °C)-98.6 °F (37 °C)] 97.8 °F (36.6 °C)  Pulse:  [67-79] 72  Resp:  [15-23] 18  SpO2:  [95 %-100 %] 97 %  BP: ()/(54-73) 114/67     Weight: 81.7 kg (180 lb 1.9 oz)  Body mass index is 24.43 kg/m².     SpO2: 97 %         Intake/Output Summary (Last 24 hours) at 10/9/2023 1338  Last data filed at 10/9/2023 1118  Gross per 24 hour   Intake 1240 ml   Output 350 ml   Net 890 ml       Lines/Drains/Airways       Peripheral Intravenous Line  Duration                  Peripheral IV - Single Lumen 10/07/23 1109 20 G Left Forearm 2 days                       Physical Exam  Vitals and nursing note reviewed.   Constitutional:       General: He is not in acute distress.     Appearance: Normal appearance. He is well-developed. He is not diaphoretic.   HENT:      Head: Normocephalic and atraumatic.   Eyes:      General:         Right eye: No discharge.         Left eye: No discharge.      Pupils: Pupils are equal, round, and reactive to light.   Neck:      Thyroid: No thyromegaly.      Vascular: No JVD.      Trachea: No tracheal deviation.   Cardiovascular:      Rate and Rhythm: Normal rate and regular rhythm. Extrasystoles (PVC's) are present.     Heart sounds: Normal heart sounds, S1 normal and S2 normal. No murmur heard.     Comments: AICD site well-healed  Pulmonary:      Effort: Pulmonary effort is normal. No respiratory distress.      Breath sounds: Normal breath sounds. No wheezing or rales.   Abdominal:      General: There is no distension.      Palpations: Abdomen is soft.      Tenderness: There is no rebound.   Musculoskeletal:      Cervical back: Neck supple.      Right lower leg: No edema.      Left lower leg: No edema.   Skin:     General: Skin is warm and dry.      Findings: No erythema.   Neurological:      General: No focal deficit present.      Mental Status: He is alert and oriented to person, place, and time.   Psychiatric:         Mood and  "Affect: Mood normal.         Behavior: Behavior normal.         Thought Content: Thought content normal.            Significant Labs: CMP   Recent Labs   Lab 10/08/23  0311 10/09/23  0335    138   K 3.4* 3.3*   CL 98 99   CO2 25 25   * 171*   BUN 30* 35*   CREATININE 2.2* 2.4*   CALCIUM 9.6 9.0   ANIONGAP 17* 14   , CBC   Recent Labs   Lab 10/08/23  0311 10/09/23  0335   WBC 4.73 3.84*   HGB 13.6* 13.6*   HCT 39.4* 39.1*    170   , Troponin No results for input(s): "TROPONINI" in the last 48 hours., and All pertinent lab results from the last 24 hours have been reviewed.    Significant Imaging: Echocardiogram: Transthoracic echo (TTE) complete (Cupid Only):   Results for orders placed or performed during the hospital encounter of 10/07/23   Echo   Result Value Ref Range    BSA 2.01 m2    LVOT stroke volume 36.56 cm3    LVIDd 6.60 (A) 3.5 - 6.0 cm    LV Systolic Volume 189.69 mL    LV Systolic Volume Index 93.9 mL/m2    LVIDs 6.14 (A) 2.1 - 4.0 cm    LV Diastolic Volume 223.24 mL    LV Diastolic Volume Index 110.51 mL/m2    IVS 0.78 0.6 - 1.1 cm    LVOT diameter 1.90 cm    LVOT area 2.8 cm2    FS 7 (A) 28 - 44 %    Left Ventricle Relative Wall Thickness 0.26 cm    Posterior Wall 0.86 0.6 - 1.1 cm    LV mass 226.89 g    LV Mass Index 112 g/m2    MV Peak E Zeferino 0.97 m/s    TDI LATERAL 0.08 m/s    TDI SEPTAL 0.02 m/s    E/E' ratio 19.40 m/s    MV Peak A Zeferino 0.36 m/s    TR Max Zeferino 2.59 m/s    E/A ratio 2.69     IVRT 88.49 msec    E wave deceleration time 178.90 msec    LV SEPTAL E/E' RATIO 48.50 m/s    LV LATERAL E/E' RATIO 12.13 m/s    LVOT peak zeferino 0.73 m/s    Left Ventricular Outflow Tract Mean Velocity 0.53 cm/s    Left Ventricular Outflow Tract Mean Gradient 1.27 mmHg    LA size 4.39 cm    Left Atrium Minor Axis 6.97 cm    Left Atrium Major Axis 6.91 cm    LA volume (mod) 111.16 cm3    LA Volume Index (Mod) 55.0 mL/m2    RVDD 2.76 cm    RVOT peak VTI 19.1 cm    TAPSE 1.80 cm    RA Major Axis 5.02 " cm    AV regurgitation pressure 1/2 time 458.798665246761404 ms    AR Max Zeferino 2.70 m/s    AV mean gradient 3 mmHg    AV peak gradient 5 mmHg    Ao peak zeferino 1.15 m/s    Ao VTI 23.20 cm    LVOT peak VTI 12.90 cm    AV valve area 1.58 cm²    AV Velocity Ratio 0.63     AV index (prosthetic) 0.56     ALLAN by Velocity Ratio 1.80 cm²    MV stenosis pressure 1/2 time 51.88 ms    MV valve area p 1/2 method 4.24 cm2    Triscuspid Valve Regurgitation Peak Gradient 27 mmHg    PV mean gradient 2 mmHg    PV PEAK VELOCITY 0.78 m/s    PV peak gradient 2 mmHg    Pulmonary Valve Mean Velocity 0.56 m/s    RVOT peak zeferino 0.75 m/s    Ao root annulus 3.29 cm    STJ 2.90 cm    Ascending aorta 2.79 cm    Mean e' 0.05 m/s    ZLVIDS 4.11     ZLVIDD 1.03     LA Volume Index 59.0 mL/m2    LA volume 119.12 cm3    LA WIDTH 4.6 cm    RA Width 4.0 cm    TV resting pulmonary artery pressure 35 mmHg    RV TB RVSP 11 mmHg    Est. RA pres 8 mmHg    Narrative      Left Ventricle: The left ventricle is severely dilated. Wall is thinner   than normal. Severe global hypokinesis present. There is severely reduced   systolic function with a visually estimated ejection fraction of 15 - 20%.   Grade III diastolic dysfunction.    Left Atrium: Left atrium is severely dilated.    Right Ventricle: Normal right ventricular cavity size. Wall thickness   is normal. Right ventricle wall motion  is normal. Systolic function is   normal. Pacemaker lead present in the ventricle.    Aortic Valve: There is moderate aortic regurgitation.    Mitral Valve: There is mild to moderate regurgitation.    Tricuspid Valve: There is mild regurgitation.    IVC/SVC: Intermediate venous pressure at 8 mmHg.     , EKG: Reviewed, and X-Ray: CXR: X-Ray Chest 1 View (CXR):   Results for orders placed or performed during the hospital encounter of 10/07/23   X-Ray Chest 1 View    Narrative    EXAMINATION:  XR CHEST 1 VIEW    CLINICAL HISTORY:  Encounter for adjustment and management of  automatic implantable cardiac defibrillator    FINDINGS:  Single view of the chest.  Comparison 02/07/2018.  Left-sided generator and defibrillator wires demonstrates similar configuration.    Cardiac silhouette is normal.  The lungs demonstrate no evidence of active disease.  No evidence of pleural effusion or pneumothorax.  Bones appear intact.  Moderate degenerative changes and moderate atherosclerotic disease.  Sternotomy wires are intact.      Impression    No acute process seen.      Electronically signed by: Pj Velarde MD  Date:    10/07/2023  Time:    10:29    and X-Ray Chest PA and Lateral (CXR): No results found for this visit on 10/07/23.    Assessment and Plan:   Patient who presents with VT s/p AICD shock. Remains stable CV wise. Continue amiodarone/BB. Replete electrolytes. EP consulted. Will also need ischemic workup.     * Ventricular tachycardia  Continue with beta-blocker from home.    Start IV amiodarone for 24 hours and then switch to 400 mg b.i.d for 1 week then 200 mg daily.  ICD interrogation reviewed.  Does not appear to have lead issues, sometimes ICD vibration happens if there is a lead fracture.    Chest x-ray does not show any lead fracture.  Battery life 3.5 years  Reviewed his ICD interrogation lead impedance is good    10.8.2023  Reviewed his 10 events.  He had 20 beats run of V-tach at 1:00 a.m..  Review of his ICD interrogation showed more than 2000 episodes of nonsustained V-tach in the last few months.  He is on carvedilol at home.  Currently blood pressure running low for resume and his carvedilol.  Once BP allows will start him on low-dose Toprol 25 mg.    We will hold his isosorbide for now and the Lasix  Will amiodarone IV to p.o. 400 mg b.i.d. for 1 week then 200 mg daily.    10/9/23  -Stable overnight, occasional PVC's noted on monitor this AM  -Continue amiodarone  -Continue Toprol XL  -Replete K > 4, keep Mg > 2  -EP consulted    IVÁN (acute kidney  injury)  -Monitor    Hypokalemia  -Replete K to > 4    HFrEF (heart failure with reduced ejection fraction)  -Continue home meds.  Euvolemic    10/9/23  -Continue Toprol XL  -Entresto held due to bumped creatinine    CAD (coronary artery disease)  Chest pain-free.  Troponin elevation possibly due to his VFib shocked.  Continue home medications.  Check magnesium and electrolytes trend trop    10/9/23  -Repeat troponin pending  -Continue OMT-ASA, Plavix, BB, statin  -Will need ischemic workup          VTE Risk Mitigation (From admission, onward)         Ordered     IP VTE LOW RISK PATIENT  Once         10/07/23 1352     Place sequential compression device  Until discontinued         10/07/23 1352                Kayley Morris PA-C  Cardiology  [facility]

## 2023-10-09 NOTE — PLAN OF CARE
Problem: Adult Inpatient Plan of Care  Goal: Plan of Care Review  Outcome: Ongoing, Progressing  Pt AAOx4, no complaints of pain or discomfort. Afebrile. NSR on monitor, pt had a few runs of non-sustained vtach, 6-7 beats and remains asymptomatic. BP stable. PIV saline locked. Pt voids per urinal independently. Pt repositions independently in bed, refusing bed alarm due to frequently sitting up on side of bed. POC reviewed with pt, verbalized understanding. Will continue with current POC and update as needed.

## 2023-10-10 LAB
ANION GAP SERPL CALC-SCNC: 13 MMOL/L (ref 8–16)
BASOPHILS # BLD AUTO: 0.03 K/UL (ref 0–0.2)
BASOPHILS NFR BLD: 0.9 % (ref 0–1.9)
BNP SERPL-MCNC: 690 PG/ML (ref 0–99)
BUN SERPL-MCNC: 40 MG/DL (ref 8–23)
CALCIUM SERPL-MCNC: 9.2 MG/DL (ref 8.7–10.5)
CHLORIDE SERPL-SCNC: 99 MMOL/L (ref 95–110)
CO2 SERPL-SCNC: 27 MMOL/L (ref 23–29)
CREAT SERPL-MCNC: 2.5 MG/DL (ref 0.5–1.4)
DIFFERENTIAL METHOD: ABNORMAL
EOSINOPHIL # BLD AUTO: 0.1 K/UL (ref 0–0.5)
EOSINOPHIL NFR BLD: 2.4 % (ref 0–8)
ERYTHROCYTE [DISTWIDTH] IN BLOOD BY AUTOMATED COUNT: 13.8 % (ref 11.5–14.5)
EST. GFR  (NO RACE VARIABLE): 29 ML/MIN/1.73 M^2
GLUCOSE SERPL-MCNC: 157 MG/DL (ref 70–110)
HCT VFR BLD AUTO: 39.3 % (ref 40–54)
HGB BLD-MCNC: 13.3 G/DL (ref 14–18)
IMM GRANULOCYTES # BLD AUTO: 0.01 K/UL (ref 0–0.04)
IMM GRANULOCYTES NFR BLD AUTO: 0.3 % (ref 0–0.5)
LACTATE SERPL-SCNC: 1.8 MMOL/L (ref 0.5–2.2)
LYMPHOCYTES # BLD AUTO: 0.9 K/UL (ref 1–4.8)
LYMPHOCYTES NFR BLD: 26.7 % (ref 18–48)
MAGNESIUM SERPL-MCNC: 2.4 MG/DL (ref 1.6–2.6)
MCH RBC QN AUTO: 30.6 PG (ref 27–31)
MCHC RBC AUTO-ENTMCNC: 33.8 G/DL (ref 32–36)
MCV RBC AUTO: 90 FL (ref 82–98)
MONOCYTES # BLD AUTO: 0.4 K/UL (ref 0.3–1)
MONOCYTES NFR BLD: 12.5 % (ref 4–15)
NEUTROPHILS # BLD AUTO: 1.9 K/UL (ref 1.8–7.7)
NEUTROPHILS NFR BLD: 57.2 % (ref 38–73)
NRBC BLD-RTO: 0 /100 WBC
PLATELET # BLD AUTO: 177 K/UL (ref 150–450)
PMV BLD AUTO: 10.5 FL (ref 9.2–12.9)
POCT GLUCOSE: 149 MG/DL (ref 70–110)
POCT GLUCOSE: 160 MG/DL (ref 70–110)
POCT GLUCOSE: 205 MG/DL (ref 70–110)
POCT GLUCOSE: 218 MG/DL (ref 70–110)
POTASSIUM SERPL-SCNC: 3.6 MMOL/L (ref 3.5–5.1)
RBC # BLD AUTO: 4.35 M/UL (ref 4.6–6.2)
SODIUM SERPL-SCNC: 139 MMOL/L (ref 136–145)
TSH SERPL DL<=0.005 MIU/L-ACNC: 1.98 UIU/ML (ref 0.4–4)
WBC # BLD AUTO: 3.37 K/UL (ref 3.9–12.7)

## 2023-10-10 PROCEDURE — 99152 PR MOD CONSCIOUS SEDATION, SAME PHYS, 5+ YRS, FIRST 15 MIN: ICD-10-PCS | Mod: ,,, | Performed by: INTERNAL MEDICINE

## 2023-10-10 PROCEDURE — 25000003 PHARM REV CODE 250: Performed by: NURSE PRACTITIONER

## 2023-10-10 PROCEDURE — 63600175 PHARM REV CODE 636 W HCPCS: Performed by: INTERNAL MEDICINE

## 2023-10-10 PROCEDURE — 99232 PR SUBSEQUENT HOSPITAL CARE,LEVL II: ICD-10-PCS | Mod: 25,,, | Performed by: PHYSICIAN ASSISTANT

## 2023-10-10 PROCEDURE — 99232 SBSQ HOSP IP/OBS MODERATE 35: CPT | Mod: 25,,, | Performed by: PHYSICIAN ASSISTANT

## 2023-10-10 PROCEDURE — 93451 RIGHT HEART CATH: CPT | Mod: 26,,, | Performed by: INTERNAL MEDICINE

## 2023-10-10 PROCEDURE — 36415 COLL VENOUS BLD VENIPUNCTURE: CPT | Performed by: INTERNAL MEDICINE

## 2023-10-10 PROCEDURE — 83880 ASSAY OF NATRIURETIC PEPTIDE: CPT | Performed by: PHYSICIAN ASSISTANT

## 2023-10-10 PROCEDURE — 25000003 PHARM REV CODE 250: Performed by: INTERNAL MEDICINE

## 2023-10-10 PROCEDURE — 99223 1ST HOSP IP/OBS HIGH 75: CPT | Mod: ,,, | Performed by: INTERNAL MEDICINE

## 2023-10-10 PROCEDURE — C1769 GUIDE WIRE: HCPCS | Performed by: INTERNAL MEDICINE

## 2023-10-10 PROCEDURE — C1894 INTRO/SHEATH, NON-LASER: HCPCS | Performed by: INTERNAL MEDICINE

## 2023-10-10 PROCEDURE — 83735 ASSAY OF MAGNESIUM: CPT | Performed by: NURSE PRACTITIONER

## 2023-10-10 PROCEDURE — 99152 MOD SED SAME PHYS/QHP 5/>YRS: CPT | Mod: ,,, | Performed by: INTERNAL MEDICINE

## 2023-10-10 PROCEDURE — 36415 COLL VENOUS BLD VENIPUNCTURE: CPT | Performed by: PHYSICIAN ASSISTANT

## 2023-10-10 PROCEDURE — 93451 PR RIGHT HEART CATH O2 SATURATION & CARDIAC OUTPUT: ICD-10-PCS | Mod: 26,,, | Performed by: INTERNAL MEDICINE

## 2023-10-10 PROCEDURE — 84443 ASSAY THYROID STIM HORMONE: CPT | Performed by: INTERNAL MEDICINE

## 2023-10-10 PROCEDURE — 36415 COLL VENOUS BLD VENIPUNCTURE: CPT | Performed by: NURSE PRACTITIONER

## 2023-10-10 PROCEDURE — 99223 PR INITIAL HOSPITAL CARE,LEVL III: ICD-10-PCS | Mod: ,,, | Performed by: INTERNAL MEDICINE

## 2023-10-10 PROCEDURE — 25000003 PHARM REV CODE 250: Performed by: SPECIALIST

## 2023-10-10 PROCEDURE — 80048 BASIC METABOLIC PNL TOTAL CA: CPT | Performed by: NURSE PRACTITIONER

## 2023-10-10 PROCEDURE — 99152 MOD SED SAME PHYS/QHP 5/>YRS: CPT | Performed by: INTERNAL MEDICINE

## 2023-10-10 PROCEDURE — C1751 CATH, INF, PER/CENT/MIDLINE: HCPCS | Performed by: INTERNAL MEDICINE

## 2023-10-10 PROCEDURE — 21400001 HC TELEMETRY ROOM

## 2023-10-10 PROCEDURE — 83605 ASSAY OF LACTIC ACID: CPT | Performed by: PHYSICIAN ASSISTANT

## 2023-10-10 PROCEDURE — 94761 N-INVAS EAR/PLS OXIMETRY MLT: CPT

## 2023-10-10 PROCEDURE — 93451 RIGHT HEART CATH: CPT | Performed by: INTERNAL MEDICINE

## 2023-10-10 PROCEDURE — 85025 COMPLETE CBC W/AUTO DIFF WBC: CPT | Performed by: NURSE PRACTITIONER

## 2023-10-10 RX ORDER — FENTANYL CITRATE 50 UG/ML
INJECTION, SOLUTION INTRAMUSCULAR; INTRAVENOUS
Status: DISCONTINUED | OUTPATIENT
Start: 2023-10-10 | End: 2023-10-10 | Stop reason: HOSPADM

## 2023-10-10 RX ORDER — FUROSEMIDE 40 MG/1
40 TABLET ORAL 2 TIMES DAILY
Status: DISCONTINUED | OUTPATIENT
Start: 2023-10-10 | End: 2023-10-11

## 2023-10-10 RX ORDER — ONDANSETRON 8 MG/1
8 TABLET, ORALLY DISINTEGRATING ORAL EVERY 8 HOURS PRN
Status: DISCONTINUED | OUTPATIENT
Start: 2023-10-10 | End: 2023-10-12 | Stop reason: HOSPADM

## 2023-10-10 RX ORDER — ACETAMINOPHEN 325 MG/1
650 TABLET ORAL EVERY 4 HOURS PRN
Status: DISCONTINUED | OUTPATIENT
Start: 2023-10-10 | End: 2023-10-12 | Stop reason: HOSPADM

## 2023-10-10 RX ORDER — MILRINONE LACTATE 0.2 MG/ML
0.38 INJECTION, SOLUTION INTRAVENOUS CONTINUOUS
Status: DISCONTINUED | OUTPATIENT
Start: 2023-10-10 | End: 2023-10-12 | Stop reason: HOSPADM

## 2023-10-10 RX ADMIN — FAMOTIDINE 20 MG: 20 TABLET ORAL at 09:10

## 2023-10-10 RX ADMIN — INSULIN ASPART 4 UNITS: 100 INJECTION, SOLUTION INTRAVENOUS; SUBCUTANEOUS at 11:10

## 2023-10-10 RX ADMIN — FUROSEMIDE 40 MG: 40 TABLET ORAL at 08:10

## 2023-10-10 RX ADMIN — ASPIRIN 81 MG: 81 TABLET, COATED ORAL at 09:10

## 2023-10-10 RX ADMIN — MUPIROCIN: 20 OINTMENT TOPICAL at 09:10

## 2023-10-10 RX ADMIN — AMIODARONE HYDROCHLORIDE 400 MG: 200 TABLET ORAL at 08:10

## 2023-10-10 RX ADMIN — MILRINONE LACTATE IN DEXTROSE 0.38 MCG/KG/MIN: 200 INJECTION, SOLUTION INTRAVENOUS at 09:10

## 2023-10-10 RX ADMIN — AMIODARONE HYDROCHLORIDE 400 MG: 200 TABLET ORAL at 09:10

## 2023-10-10 RX ADMIN — MUPIROCIN: 20 OINTMENT TOPICAL at 08:10

## 2023-10-10 RX ADMIN — METOPROLOL SUCCINATE 25 MG: 25 TABLET, EXTENDED RELEASE ORAL at 09:10

## 2023-10-10 RX ADMIN — CLOPIDOGREL BISULFATE 75 MG: 75 TABLET ORAL at 09:10

## 2023-10-10 RX ADMIN — INSULIN ASPART 2 UNITS: 100 INJECTION, SOLUTION INTRAVENOUS; SUBCUTANEOUS at 08:10

## 2023-10-10 RX ADMIN — ATORVASTATIN CALCIUM 40 MG: 40 TABLET, FILM COATED ORAL at 09:10

## 2023-10-10 RX ADMIN — INSULIN ASPART 2 UNITS: 100 INJECTION, SOLUTION INTRAVENOUS; SUBCUTANEOUS at 05:10

## 2023-10-10 NOTE — CONSULTS
Asked to see patient for VT/ICD shocks by Dr Gandhi  I reviewed the chart in detail including all relevant clinical notes, cardiac study reports, lab data and Xrays.  I have reviewed the actual images of all relevant ECG, rhythm, holter and long term monitoring tracings obtained during current hospitalization and in past records.   I have reviewed the actual images of all relevant CXRays.   I have directly evaluated all relevant data pertaining to any CIED.   Patient was then seen, interviewed and evaluated.     HPI:  63 yr old with hx of CABG (2009), CAD, CHF,HTN, HLD and MDT defibrillator (2009)developed near syncope 5 days ago and just as he was passing out his defibrillator shocked him and he fully recovered. He did not seek help. Yesterday am he noticed a buzzing sound and came to get it checked. In ER, he was asymptomatic.     The event was a true VF event per strip review.        Hospital Course:   No more shocks since admit.  However his monitor showed 1 episode of nonsustained V-tach for 20 beats (1:00 a.m..10/9/23)     Labs: K 3.3, being repleted. Mg 2.2. Creatinine 2.4.   Cards team planning on ischemic workup.     A and Rec:   I think that his CHF med regimen can be improved to mirror GDMT more closely.  Otherwise, amiodarone and ischemic w/up are the reasonable first steps.   He needs a TSH now  PFT/DLCO whenever possible

## 2023-10-10 NOTE — PROGRESS NOTES
[facility] Medicine  Progress Note    Patient Name: Radha Abbott  MRN: [id number]  Patient Class: IP- Inpatient   Admission Date: 10/7/2023  Length of Stay: 3 days  Attending Physician: Mery Calvo MD  Primary Care Provider: Vasu Kong MD        Subjective:     Principal Problem:Ventricular tachycardia        HPI:  64 y/o male with PMHx of CABG, CAD, HTN, HLD and defibrillator developed near syncope 4 days ago and just as he was passing out his defibrillator shocked him and he fully recovered. He did not seek help. This am he noticed a buzzing sound and came to get it checked. He has family at bedside. He is asymptomatic. Patient admitted to ICU on 10/7/23. Cardiology consulted - noted V-fib episodes on ICD interrogation, started on IV amiodarone.  ----  Patient noted with 20 beat v-tach overnight  Amiodarone IV transitioned to PO  Marginal BP this morning, BP meds held  Cardiology following      Overview/Hospital Course:  Patient transitioned out of ICU.  He has been seen by Cardiology and EP has been consulted.  Had a 20 beat run of V-tach in early morning of 10/9.      Interval History:  Patient has no complaints.  He seen wife at bedside.  Has no shortness a breath, no chest pain.    Review of Systems   Constitutional:  Positive for fatigue.   HENT:  Negative for sinus pain.    Respiratory:  Negative for chest tightness.    Cardiovascular:  Negative for chest pain, palpitations and leg swelling.   Gastrointestinal:  Negative for abdominal distention.   Neurological:  Negative for weakness.   All other systems reviewed and are negative.    Objective:     Vital Signs (Most Recent):  Temp: 97 °F (36.1 °C) (10/10/23 1736)  Pulse: 74 (10/10/23 1736)  Resp: 18 (10/10/23 1736)  BP: 114/71 (10/10/23 1736)  SpO2: 97 % (10/10/23 1736) Vital Signs (24h Range):  Temp:  [97 °F (36.1 °C)-99 °F (37.2 °C)] 97 °F (36.1 °C)  Pulse:  [64-78] 74  Resp:  [17-18] 18  SpO2:  [97 %-99 %] 97 %  BP:  (103-116)/(63-72) 114/71     Weight: 81.7 kg (180 lb 1.9 oz)  Body mass index is 24.43 kg/m².  No intake or output data in the 24 hours ending 10/10/23 1832      Physical Exam  Vitals reviewed.   Constitutional:       Appearance: Normal appearance.   HENT:      Head: Normocephalic and atraumatic.      Mouth/Throat:      Mouth: Mucous membranes are moist.      Pharynx: Oropharynx is clear.   Eyes:      Extraocular Movements: Extraocular movements intact.      Conjunctiva/sclera: Conjunctivae normal.   Cardiovascular:      Rate and Rhythm: Normal rate and regular rhythm.      Pulses: Normal pulses.      Heart sounds: Murmur heard.   Pulmonary:      Effort: Pulmonary effort is normal.      Breath sounds: Normal breath sounds.   Abdominal:      General: Bowel sounds are normal.      Palpations: Abdomen is soft.   Musculoskeletal:         General: Normal range of motion.      Cervical back: Normal range of motion and neck supple.   Skin:     General: Skin is warm and dry.   Neurological:      General: No focal deficit present.      Mental Status: He is alert and oriented to person, place, and time. Mental status is at baseline.   Psychiatric:         Mood and Affect: Mood normal.         Behavior: Behavior normal.         Thought Content: Thought content normal.             Significant Labs: All pertinent labs within the past 24 hours have been reviewed.  CBC:   Recent Labs   Lab 10/09/23  0335 10/10/23  0627   WBC 3.84* 3.37*   HGB 13.6* 13.3*   HCT 39.1* 39.3*    177     CMP:   Recent Labs   Lab 10/09/23  0335 10/10/23  0627    139   K 3.3* 3.6   CL 99 99   CO2 25 27   * 157*   BUN 35* 40*   CREATININE 2.4* 2.5*   CALCIUM 9.0 9.2   ANIONGAP 14 13     Cardiac Markers:   Recent Labs   Lab 10/10/23  0627   *     Lactic Acid:   Recent Labs   Lab 10/10/23  1052   LACTATE 1.8       Magnesium:   Recent Labs   Lab 10/09/23  0335 10/10/23  0627   MG 2.2 2.4     Troponin:   Recent Labs   Lab  10/09/23  1413   TROPONINI 0.023     TSH:   Recent Labs   Lab 10/10/23  0627   TSH 1.984       Significant Imaging: I have reviewed all pertinent imaging results/findings within the past 24 hours.      Assessment/Plan:      * Ventricular tachycardia  Admitted to ICU, amiodarone IV transitioned to PO  ICD interrogation > 2000 episodes of NSVT in the last few months  Cardiology following consulted EP for wait to see recs in a.m.    IVÁN (acute kidney injury)  Patient with acute kidney injury/acute renal failure likely due to workup pending IVÁN is currently stable. Baseline creatinine 1.17 per OSH epic chart review - Labs reviewed- Renal function/electrolytes with Estimated Creatinine Clearance: 34.1 mL/min (A) (based on SCr of 2.5 mg/dL (H)). according to latest data. Monitor urine output and serial BMP and adjust therapy as needed. Avoid nephrotoxins and renally dose meds for GFR listed above.     Renal ultrasound unremarkable  Urinalysis pending  Likely secondary to ongoing CHF    Hypokalemia  PRN repletion orders in place  Monitor labs      DM (diabetes mellitus)  Patient's FSGs are uncontrolled due to hyperglycemia on current medication regimen.  Last A1c reviewed-   Lab Results   Component Value Date    HGBA1C 7.7 (H) 11/02/2021     Most recent fingerstick glucose reviewed-   Recent Labs   Lab 10/09/23  2128 10/10/23  0549 10/10/23  1111 10/10/23  1747   POCTGLUCOSE 156* 149* 205* 160*     Current correctional scale  Medium  Maintain anti-hyperglycemic dose as follows-   Antihyperglycemics (From admission, onward)    Start     Stop Route Frequency Ordered    10/11/23 0900  empagliflozin (Jardiance) tablet 10 mg        Question Answer Comment   Does this patient have a diagnosis of heart failure? Yes    Does this patient have type 1 diabetes or diabetic ketoacidosis? No    Does this patient have symptomatic hypotension? No    Is the patient NPO or pending major surgery in next 3 days or less? No        -- Oral  Daily 10/10/23 1740    10/07/23 1452  insulin aspart U-100 pen 0-10 Units         -- SubQ Every 6 hours PRN 10/07/23 1352        Hold Oral hypoglycemics while patient is in the hospital.    HFrEF (heart failure with reduced ejection fraction)  Compensated, BP marginal  Holding diuretics were with increasing creatinine may need to reinstitute  Cardiology following    Echo    Result Date: 10/7/2023    Left Ventricle: The left ventricle is severely dilated. Wall is thinner   than normal. Severe global hypokinesis present. There is severely reduced   systolic function with a visually estimated ejection fraction of 15 - 20%.   Grade III diastolic dysfunction.    Left Atrium: Left atrium is severely dilated.    Right Ventricle: Normal right ventricular cavity size. Wall thickness   is normal. Right ventricle wall motion  is normal. Systolic function is   normal. Pacemaker lead present in the ventricle.    Aortic Valve: There is moderate aortic regurgitation.    Mitral Valve: There is mild to moderate regurgitation.    Tricuspid Valve: There is mild regurgitation.    IVC/SVC: Intermediate venous pressure at 8 mmHg.      We will await cardiac recommendations regarding diuretics, heart catheterization, dobutamine/Milrinone infusion    CAD (coronary artery disease)    Pain-free, troponin likely elevated secondary to VFib with defibrillation  We will need ischemic workup sometime in the future however with elevated creatinine we will have to discuss with Cardiology timing  Continue current medication of aspirin Plavix beta-blocker and statin      VTE Risk Mitigation (From admission, onward)         Ordered     IP VTE LOW RISK PATIENT  Once         10/07/23 1352     Place sequential compression device  Until discontinued         10/07/23 1352                Discharge Planning   STANLEY: 10/10/2023     Code Status: Full Code   Is the patient medically ready for discharge?:     Reason for patient still in hospital (select all  that apply): Patient trending condition, Treatment, Consult recommendations and Pending disposition  Discharge Plan A: Home with family                  Mery Randall MD  Department of Hospital Medicine   [facility]

## 2023-10-10 NOTE — ASSESSMENT & PLAN NOTE
Patient's FSGs are uncontrolled due to hyperglycemia on current medication regimen.  Last A1c reviewed-   Lab Results   Component Value Date    HGBA1C 7.7 (H) 11/02/2021     Most recent fingerstick glucose reviewed-   Recent Labs   Lab 10/09/23  2128 10/10/23  0549 10/10/23  1111 10/10/23  1747   POCTGLUCOSE 156* 149* 205* 160*     Current correctional scale  Medium  Maintain anti-hyperglycemic dose as follows-   Antihyperglycemics (From admission, onward)    Start     Stop Route Frequency Ordered    10/11/23 0900  empagliflozin (Jardiance) tablet 10 mg        Question Answer Comment   Does this patient have a diagnosis of heart failure? Yes    Does this patient have type 1 diabetes or diabetic ketoacidosis? No    Does this patient have symptomatic hypotension? No    Is the patient NPO or pending major surgery in next 3 days or less? No        -- Oral Daily 10/10/23 1740    10/07/23 1452  insulin aspart U-100 pen 0-10 Units         -- SubQ Every 6 hours PRN 10/07/23 1352        Hold Oral hypoglycemics while patient is in the hospital.

## 2023-10-10 NOTE — SUBJECTIVE & OBJECTIVE
Interval History:  Patient has no complaints.  He seen wife at bedside.  Has no shortness a breath, no chest pain.    Review of Systems   Constitutional:  Positive for fatigue.   HENT:  Negative for sinus pain.    Respiratory:  Negative for chest tightness.    Cardiovascular:  Negative for chest pain, palpitations and leg swelling.   Gastrointestinal:  Negative for abdominal distention.   Neurological:  Negative for weakness.   All other systems reviewed and are negative.    Objective:     Vital Signs (Most Recent):  Temp: 97 °F (36.1 °C) (10/10/23 1736)  Pulse: 74 (10/10/23 1736)  Resp: 18 (10/10/23 1736)  BP: 114/71 (10/10/23 1736)  SpO2: 97 % (10/10/23 1736) Vital Signs (24h Range):  Temp:  [97 °F (36.1 °C)-99 °F (37.2 °C)] 97 °F (36.1 °C)  Pulse:  [64-78] 74  Resp:  [17-18] 18  SpO2:  [97 %-99 %] 97 %  BP: (103-116)/(63-72) 114/71     Weight: 81.7 kg (180 lb 1.9 oz)  Body mass index is 24.43 kg/m².  No intake or output data in the 24 hours ending 10/10/23 1832      Physical Exam  Vitals reviewed.   Constitutional:       Appearance: Normal appearance.   HENT:      Head: Normocephalic and atraumatic.      Mouth/Throat:      Mouth: Mucous membranes are moist.      Pharynx: Oropharynx is clear.   Eyes:      Extraocular Movements: Extraocular movements intact.      Conjunctiva/sclera: Conjunctivae normal.   Cardiovascular:      Rate and Rhythm: Normal rate and regular rhythm.      Pulses: Normal pulses.      Heart sounds: Murmur heard.   Pulmonary:      Effort: Pulmonary effort is normal.      Breath sounds: Normal breath sounds.   Abdominal:      General: Bowel sounds are normal.      Palpations: Abdomen is soft.   Musculoskeletal:         General: Normal range of motion.      Cervical back: Normal range of motion and neck supple.   Skin:     General: Skin is warm and dry.   Neurological:      General: No focal deficit present.      Mental Status: He is alert and oriented to person, place, and time. Mental status is  at baseline.   Psychiatric:         Mood and Affect: Mood normal.         Behavior: Behavior normal.         Thought Content: Thought content normal.             Significant Labs: All pertinent labs within the past 24 hours have been reviewed.  CBC:   Recent Labs   Lab 10/09/23  0335 10/10/23  0627   WBC 3.84* 3.37*   HGB 13.6* 13.3*   HCT 39.1* 39.3*    177     CMP:   Recent Labs   Lab 10/09/23  0335 10/10/23  0627    139   K 3.3* 3.6   CL 99 99   CO2 25 27   * 157*   BUN 35* 40*   CREATININE 2.4* 2.5*   CALCIUM 9.0 9.2   ANIONGAP 14 13     Cardiac Markers:   Recent Labs   Lab 10/10/23  0627   *     Lactic Acid:   Recent Labs   Lab 10/10/23  1052   LACTATE 1.8       Magnesium:   Recent Labs   Lab 10/09/23  0335 10/10/23  0627   MG 2.2 2.4     Troponin:   Recent Labs   Lab 10/09/23  1413   TROPONINI 0.023     TSH:   Recent Labs   Lab 10/10/23  0627   TSH 1.984       Significant Imaging: I have reviewed all pertinent imaging results/findings within the past 24 hours.

## 2023-10-10 NOTE — NURSING TRANSFER
Nursing Transfer Note      10/10/2023   2703    Report to TY RN  NADN  A & O X 3  Cooperative/communicative  Denies pain.  Dsg to r arm c,d,I.  Heplock patent/intact.  Wife w/patient.  Neuro intact  MAEW=

## 2023-10-10 NOTE — PROGRESS NOTES
[facility]  Cardiology  Progress Note    Patient Name: Radha Abbott  MRN: [id number]  Admission Date: 10/7/2023  Hospital Length of Stay: 3 days  Code Status: Full Code   Attending Physician: Mery Calvo MD   Primary Care Physician: Vasu Kong MD  Expected Discharge Date: 10/10/2023  Principal Problem:Ventricular tachycardia    Subjective:   HPI:  63 yr old with hx of CABG, CAD, HTN, HLD and defibrillator developed near syncope 4 days ago and just as he was passing out his defibrillator shocked him and he fully recovered. He did not seek help. This am he noticed a buzzing sound and came to get it checked. He has family at bedside. He is asymptomatic.         Currently feels well.  Will start him on amiodarone.  Reviewed his VFib episodes scan in the system.  He has a remote history of CABG back in 2009 since then with congestive heart failure.  States that he had his ICD done at that time it is a [employer] device.  Denies any chest pain or dyspnea at the moment.      Hospital Course:   No more shocks overnight.  However his monitor showed 1 episode of nonsustained V-tach for 20 beats 1:00 a.m..    10/9/23-Patient seen and examined today, sitting up in bedside chair. Feels well. No CV complaints. Denies CP/SOB. Labs reviewed. K 3.3, being repleted. Mg 2.2. Creatinine 2.4. Will need ischemic workup. EP also consulted.    10/10/23-Patient seen and examined today, sitting up on bedside couch. Feels ok. . Creatinine with upward trend. LA WNL. RHC planned today. MPI stress test initially planned but patient had caffeine this AM.          Review of Systems   Constitutional: Positive for malaise/fatigue.   HENT: Negative.     Eyes: Negative.    Cardiovascular:  Positive for dyspnea on exertion.   Respiratory: Negative.     Endocrine: Negative.    Hematologic/Lymphatic: Negative.    Musculoskeletal: Negative.    Gastrointestinal: Negative.    Genitourinary: Negative.    Neurological: Negative.     Psychiatric/Behavioral: Negative.     Allergic/Immunologic: Negative.      Objective:     Vital Signs (Most Recent):  Temp: 97.8 °F (36.6 °C) (10/10/23 0724)  Pulse: 74 (10/10/23 1309)  Resp: 18 (10/10/23 0724)  BP: 103/65 (10/10/23 0724)  SpO2: 98 % (10/10/23 0724) Vital Signs (24h Range):  Temp:  [97.8 °F (36.6 °C)-99 °F (37.2 °C)] 97.8 °F (36.6 °C)  Pulse:  [64-78] 74  Resp:  [17-18] 18  SpO2:  [97 %-99 %] 98 %  BP: (103-112)/(63-72) 103/65     Weight: 81.7 kg (180 lb 1.9 oz)  Body mass index is 24.43 kg/m².     SpO2: 98 %         Intake/Output Summary (Last 24 hours) at 10/10/2023 1553  Last data filed at 10/9/2023 1714  Gross per 24 hour   Intake --   Output 100 ml   Net -100 ml       Lines/Drains/Airways       Peripheral Intravenous Line  Duration                  Peripheral IV - Single Lumen 10/07/23 1109 20 G Left Forearm 3 days                       Physical Exam  Vitals and nursing note reviewed.   Constitutional:       General: He is not in acute distress.     Appearance: Normal appearance. He is well-developed. He is not diaphoretic.   HENT:      Head: Normocephalic and atraumatic.   Eyes:      General:         Right eye: No discharge.         Left eye: No discharge.      Pupils: Pupils are equal, round, and reactive to light.   Neck:      Thyroid: No thyromegaly.      Vascular: No JVD.      Trachea: No tracheal deviation.   Cardiovascular:      Rate and Rhythm: Normal rate and regular rhythm. Occasional Extrasystoles (PVC's) are present.     Heart sounds: Normal heart sounds, S1 normal and S2 normal. No murmur heard.     Comments: AICD well-healed  Sternotomy site well-healed  Pulmonary:      Effort: Pulmonary effort is normal. No respiratory distress.      Breath sounds: Normal breath sounds. No wheezing or rales.   Abdominal:      General: There is no distension.      Tenderness: There is no rebound.   Musculoskeletal:      Cervical back: Neck supple.      Right lower leg: Edema present.      Left  lower leg: Edema present.   Skin:     General: Skin is warm and dry.      Findings: No erythema.   Neurological:      General: No focal deficit present.      Mental Status: He is alert and oriented to person, place, and time.   Psychiatric:         Mood and Affect: Mood normal.         Behavior: Behavior normal.         Thought Content: Thought content normal.            Significant Labs: CMP   Recent Labs   Lab 10/09/23  0335 10/10/23  0627    139   K 3.3* 3.6   CL 99 99   CO2 25 27   * 157*   BUN 35* 40*   CREATININE 2.4* 2.5*   CALCIUM 9.0 9.2   ANIONGAP 14 13   , CBC   Recent Labs   Lab 10/09/23  0335 10/10/23  0627   WBC 3.84* 3.37*   HGB 13.6* 13.3*   HCT 39.1* 39.3*    177   , Troponin   Recent Labs   Lab 10/09/23  1413   TROPONINI 0.023   , and All pertinent lab results from the last 24 hours have been reviewed.    Significant Imaging: Echocardiogram: Transthoracic echo (TTE) complete (Cupid Only):   Results for orders placed or performed during the hospital encounter of 10/07/23   Echo   Result Value Ref Range    BSA 2.01 m2    LVOT stroke volume 36.56 cm3    LVIDd 6.60 (A) 3.5 - 6.0 cm    LV Systolic Volume 189.69 mL    LV Systolic Volume Index 93.9 mL/m2    LVIDs 6.14 (A) 2.1 - 4.0 cm    LV Diastolic Volume 223.24 mL    LV Diastolic Volume Index 110.51 mL/m2    IVS 0.78 0.6 - 1.1 cm    LVOT diameter 1.90 cm    LVOT area 2.8 cm2    FS 7 (A) 28 - 44 %    Left Ventricle Relative Wall Thickness 0.26 cm    Posterior Wall 0.86 0.6 - 1.1 cm    LV mass 226.89 g    LV Mass Index 112 g/m2    MV Peak E Zeferino 0.97 m/s    TDI LATERAL 0.08 m/s    TDI SEPTAL 0.02 m/s    E/E' ratio 19.40 m/s    MV Peak A Zeferino 0.36 m/s    TR Max Zeferino 2.59 m/s    E/A ratio 2.69     IVRT 88.49 msec    E wave deceleration time 178.90 msec    LV SEPTAL E/E' RATIO 48.50 m/s    LV LATERAL E/E' RATIO 12.13 m/s    LVOT peak zeferino 0.73 m/s    Left Ventricular Outflow Tract Mean Velocity 0.53 cm/s    Left Ventricular Outflow Tract  Mean Gradient 1.27 mmHg    LA size 4.39 cm    Left Atrium Minor Axis 6.97 cm    Left Atrium Major Axis 6.91 cm    LA volume (mod) 111.16 cm3    LA Volume Index (Mod) 55.0 mL/m2    RVDD 2.76 cm    RVOT peak VTI 19.1 cm    TAPSE 1.80 cm    RA Major Axis 5.02 cm    AV regurgitation pressure 1/2 time [id number] ms    AR Max Zeferino 2.70 m/s    AV mean gradient 3 mmHg    AV peak gradient 5 mmHg    Ao peak zeferino 1.15 m/s    Ao VTI 23.20 cm    LVOT peak VTI 12.90 cm    AV valve area 1.58 cm²    AV Velocity Ratio 0.63     AV index (prosthetic) 0.56     ALLAN by Velocity Ratio 1.80 cm²    MV stenosis pressure 1/2 time 51.88 ms    MV valve area p 1/2 method 4.24 cm2    Triscuspid Valve Regurgitation Peak Gradient 27 mmHg    PV mean gradient 2 mmHg    PV PEAK VELOCITY 0.78 m/s    PV peak gradient 2 mmHg    Pulmonary Valve Mean Velocity 0.56 m/s    RVOT peak zeferino 0.75 m/s    Ao root annulus 3.29 cm    STJ 2.90 cm    Ascending aorta 2.79 cm    Mean e' 0.05 m/s    ZLVIDS 4.11     ZLVIDD 1.03     LA Volume Index 59.0 mL/m2    LA volume 119.12 cm3    LA WIDTH 4.6 cm    RA Width 4.0 cm    TV resting pulmonary artery pressure 35 mmHg    RV TB RVSP 11 mmHg    Est. RA pres 8 mmHg    Narrative      Left Ventricle: The left ventricle is severely dilated. Wall is thinner   than normal. Severe global hypokinesis present. There is severely reduced   systolic function with a visually estimated ejection fraction of 15 - 20%.   Grade III diastolic dysfunction.    Left Atrium: Left atrium is severely dilated.    Right Ventricle: Normal right ventricular cavity size. Wall thickness   is normal. Right ventricle wall motion  is normal. Systolic function is   normal. Pacemaker lead present in the ventricle.    Aortic Valve: There is moderate aortic regurgitation.    Mitral Valve: There is mild to moderate regurgitation.    Tricuspid Valve: There is mild regurgitation.    IVC/SVC: Intermediate venous pressure at 8 mmHg.     , EKG: REviewed,  and X-Ray: CXR: X-Ray Chest 1 View (CXR):   Results for orders placed or performed during the hospital encounter of 10/07/23   X-Ray Chest 1 View    Narrative    EXAMINATION:  XR CHEST 1 VIEW    CLINICAL HISTORY:  Encounter for adjustment and management of automatic implantable cardiac defibrillator    FINDINGS:  Single view of the chest.  Comparison 02/07/2018.  Left-sided generator and defibrillator wires demonstrates similar configuration.    Cardiac silhouette is normal.  The lungs demonstrate no evidence of active disease.  No evidence of pleural effusion or pneumothorax.  Bones appear intact.  Moderate degenerative changes and moderate atherosclerotic disease.  Sternotomy wires are intact.      Impression    No acute process seen.      Electronically signed by: Pj Velarde MD  Date:    10/07/2023  Time:    10:29    and X-Ray Chest PA and Lateral (CXR): No results found for this visit on 10/07/23.    Assessment and Plan:   Patient who presents with VT s/p AICD shock. Concerns for low cardiac output as creatinine continues to trend upward. Continue OMT. RHC today to delineate volume status.    * Ventricular tachycardia  Continue with beta-blocker from home.    Start IV amiodarone for 24 hours and then switch to 400 mg b.i.d for 1 week then 200 mg daily.  ICD interrogation reviewed.  Does not appear to have lead issues, sometimes ICD vibration happens if there is a lead fracture.    Chest x-ray does not show any lead fracture.  Battery life 3.5 years  Reviewed his ICD interrogation lead impedance is good    10.8.2023  Reviewed his 10 events.  He had 20 beats run of V-tach at 1:00 a.m..  Review of his ICD interrogation showed more than 2000 episodes of nonsustained V-tach in the last few months.  He is on carvedilol at home.  Currently blood pressure running low for resume and his carvedilol.  Once BP allows will start him on low-dose Toprol 25 mg.    We will hold his isosorbide for now and the Lasix  Will  amiodarone IV to p.o. 400 mg b.i.d. for 1 week then 200 mg daily.    10/9/23  -Stable overnight, occasional PVC's noted on monitor this AM  -Continue amiodarone  -Continue Toprol XL  -Replete K > 4, keep Mg > 2  -EP consulted    10/10/23  -Stable overnight  -Continue amiodarone, Toprol XL  -MPI stress test planned but cancelled as patient had caffeine    IVÁN (acute kidney injury)  -Monitor    10/10/23  -RHC today by Dr. Chou    Hypokalemia  -Replete K to > 4    HFrEF (heart failure with reduced ejection fraction)  -Continue home meds.  Euvolemic    10/9/23  -Continue Toprol XL  -Entresto held due to bumped creatinine    10/10/23  -  -Continue Toprol XL  -Creatinine continues to trend upward; concerns for cardio-renal syndrome; low cardiac output   -LA WNL  -RHC to delineate volume status    CAD (coronary artery disease)  Chest pain-free.  Troponin elevation possibly due to his VFib shocked.  Continue home medications.  Check magnesium and electrolytes trend trop    10/9/23  -Repeat troponin pending  -Continue OMT-ASA, Plavix, BB, statin  -Will need ischemic workup    10/10/23  -See above        VTE Risk Mitigation (From admission, onward)         Ordered     IP VTE LOW RISK PATIENT  Once         10/07/23 1352     Place sequential compression device  Until discontinued         10/07/23 1352                Kayley Morris PA-C  Cardiology  [facility]

## 2023-10-10 NOTE — ASSESSMENT & PLAN NOTE
Continue with beta-blocker from home.    Start IV amiodarone for 24 hours and then switch to 400 mg b.i.d for 1 week then 200 mg daily.  ICD interrogation reviewed.  Does not appear to have lead issues, sometimes ICD vibration happens if there is a lead fracture.    Chest x-ray does not show any lead fracture.  Battery life 3.5 years  Reviewed his ICD interrogation lead impedance is good    10.8.2023  Reviewed his 10 events.  He had 20 beats run of V-tach at 1:00 a.m..  Review of his ICD interrogation showed more than 2000 episodes of nonsustained V-tach in the last few months.  He is on carvedilol at home.  Currently blood pressure running low for resume and his carvedilol.  Once BP allows will start him on low-dose Toprol 25 mg.    We will hold his isosorbide for now and the Lasix  Will amiodarone IV to p.o. 400 mg b.i.d. for 1 week then 200 mg daily.    10/9/23  -Stable overnight, occasional PVC's noted on monitor this AM  -Continue amiodarone  -Continue Toprol XL  -Replete K > 4, keep Mg > 2  -EP consulted    10/10/23  -Stable overnight  -Continue amiodarone, Toprol XL  -MPI stress test planned but cancelled as patient had caffeine

## 2023-10-10 NOTE — NURSING
Pt is resting in bed. No signs of distress noted. No adverse events overnight. Pt remains free from falls. Cardiac monitoring in place. Chart check complete.

## 2023-10-10 NOTE — ASSESSMENT & PLAN NOTE
-Continue home meds.  Euvolemic    10/9/23  -Continue Toprol XL  -Entresto held due to bumped creatinine    10/10/23  -  -Continue Toprol XL  -Creatinine continues to trend upward; concerns for cardio-renal syndrome; low cardiac output   -LA WNL  -RHC to delineate volume status

## 2023-10-10 NOTE — PLAN OF CARE
Patient is stable. No signs or symptoms of acute distress. Pt awaiting RHC at this time. Continuous cardiac monitoring in place. Bed in lowest position, call light in reach. Chart orders reviewed.

## 2023-10-10 NOTE — ASSESSMENT & PLAN NOTE
Chest pain-free.  Troponin elevation possibly due to his VFib shocked.  Continue home medications.  Check magnesium and electrolytes trend trop    10/9/23  -Repeat troponin pending  -Continue OMT-ASA, Plavix, BB, statin  -Will need ischemic workup    10/10/23  -See above

## 2023-10-10 NOTE — HOSPITAL COURSE
Patient transitioned out of ICU.  He has been seen by Cardiology and EP has been consulted.  Had a 20 beat run of V-tach in early morning of 10/9.    Patient has a right heart catheterization with low pressures started on Milrinone drip overnight went into AFib with RVR currently is on heparin drip  Patient was initiated on IV Lasix with improvement in his diuresis however he is more prerenal.  The case was discussed with the advanced heart failure team and he was transferred to [facility] for further treatment.  Patient seen and examined on day of discharge and stable for transfer to [facility] for further treatment of his heart failure.

## 2023-10-10 NOTE — INTERVAL H&P NOTE
The patient has been examined and the H&P has been reviewed:    I concur with the findings and no changes have occurred since H&P was written.    Procedure risks, benefits and alternative options discussed and understood by patient/family.          Active Hospital Problems    Diagnosis  POA    *Ventricular tachycardia [I47.20]  Yes     Admit to ICU  Amiodarone infusion  Cardiology consult  Investigate defibrillator - EP study ?      Hypokalemia [E87.6]  No    IVÁN (acute kidney injury) [N17.9]  Yes    CAD (coronary artery disease) [I25.10]  Yes     Continue home medications        HFrEF (heart failure with reduced ejection fraction) [I50.20]  Yes    DM (diabetes mellitus) [E11.9]  Yes     Resume home meds  SSI        Resolved Hospital Problems   No resolved problems to display.

## 2023-10-10 NOTE — PLAN OF CARE
[facility]  Discharge Final Note    Primary Care Provider: Vasu Kong MD    Expected Discharge Date: 10/10/2023    Final Discharge Note (most recent)       Final Note - 10/10/23 1013          Final Note    Assessment Type Final Discharge Note     Anticipated Discharge Disposition Home or Self Care     Hospital Resources/Appts/Education Provided Appointments scheduled and added to AVS

## 2023-10-10 NOTE — ASSESSMENT & PLAN NOTE
Patient with acute kidney injury/acute renal failure likely due to workup pending IVÁN is currently stable. Baseline creatinine 1.17 per OSH epic chart review - Labs reviewed- Renal function/electrolytes with Estimated Creatinine Clearance: 34.1 mL/min (A) (based on SCr of 2.5 mg/dL (H)). according to latest data. Monitor urine output and serial BMP and adjust therapy as needed. Avoid nephrotoxins and renally dose meds for GFR listed above.     Renal ultrasound unremarkable  Urinalysis pending  Likely secondary to ongoing CHF

## 2023-10-10 NOTE — OP NOTE
INPATIENT Operative Note         SUMMARY     Surgery Date: 10/10/2023     Surgeon(s) and Role:     * Bin Gandhi MD - Primary    ASSISTANT:none    Pre-op Diagnosis:  VT (ventricular tachycardia) [I47.20]  HFrEF (heart failure with reduced ejection fraction) [I50.20]      Post-op Diagnosis:  VT (ventricular tachycardia) [I47.20]  HFrEF (heart failure with reduced ejection fraction) [I50.20]    Procedure(s) (LRB):  INSERTION, CATHETER, RIGHT HEART (Right)    COMPLICATION:none    Anesthesia: RN IV Sedation    Findings/Key Components:  Moderate to severe pulmonary htn    Elevated lvedp   Low cardiac output.    Estimated Blood Loss: < 50 ML.         SPECIMEN: NONE    Devices/Prostetics: None    PLAN: inotropic therapy

## 2023-10-10 NOTE — NURSING
Rec'd to [facility]  A & O X 3  Wife at bedside.  Site to r brachial c,d,I.  Cooperative/communicative

## 2023-10-10 NOTE — ASSESSMENT & PLAN NOTE
Compensated, BP marginal  Holding diuretics were with increasing creatinine may need to reinstitute  Cardiology following    Echo    Result Date: 10/7/2023    Left Ventricle: The left ventricle is severely dilated. Wall is thinner   than normal. Severe global hypokinesis present. There is severely reduced   systolic function with a visually estimated ejection fraction of 15 - 20%.   Grade III diastolic dysfunction.    Left Atrium: Left atrium is severely dilated.    Right Ventricle: Normal right ventricular cavity size. Wall thickness   is normal. Right ventricle wall motion  is normal. Systolic function is   normal. Pacemaker lead present in the ventricle.    Aortic Valve: There is moderate aortic regurgitation.    Mitral Valve: There is mild to moderate regurgitation.    Tricuspid Valve: There is mild regurgitation.    IVC/SVC: Intermediate venous pressure at 8 mmHg.      We will await cardiac recommendations regarding diuretics, heart catheterization, dobutamine/Milrinone infusion

## 2023-10-11 PROBLEM — I48.0 PAF (PAROXYSMAL ATRIAL FIBRILLATION): Status: ACTIVE | Noted: 2023-10-11

## 2023-10-11 LAB
ANION GAP SERPL CALC-SCNC: 14 MMOL/L (ref 8–16)
APTT PPP: 27.3 SEC (ref 21–32)
APTT PPP: 60.7 SEC (ref 21–32)
BASOPHILS # BLD AUTO: 0.02 K/UL (ref 0–0.2)
BASOPHILS NFR BLD: 0.6 % (ref 0–1.9)
BUN SERPL-MCNC: 41 MG/DL (ref 8–23)
CALCIUM SERPL-MCNC: 9.1 MG/DL (ref 8.7–10.5)
CHLORIDE SERPL-SCNC: 101 MMOL/L (ref 95–110)
CO2 SERPL-SCNC: 22 MMOL/L (ref 23–29)
CREAT SERPL-MCNC: 2.4 MG/DL (ref 0.5–1.4)
DIFFERENTIAL METHOD: ABNORMAL
EOSINOPHIL # BLD AUTO: 0.1 K/UL (ref 0–0.5)
EOSINOPHIL NFR BLD: 1.9 % (ref 0–8)
ERYTHROCYTE [DISTWIDTH] IN BLOOD BY AUTOMATED COUNT: 13.5 % (ref 11.5–14.5)
EST. GFR  (NO RACE VARIABLE): 30 ML/MIN/1.73 M^2
GLUCOSE SERPL-MCNC: 188 MG/DL (ref 70–110)
HCT VFR BLD AUTO: 37.2 % (ref 40–54)
HGB BLD-MCNC: 12.7 G/DL (ref 14–18)
IMM GRANULOCYTES # BLD AUTO: 0.01 K/UL (ref 0–0.04)
IMM GRANULOCYTES NFR BLD AUTO: 0.3 % (ref 0–0.5)
INR PPP: 1 (ref 0.8–1.2)
LYMPHOCYTES # BLD AUTO: 0.8 K/UL (ref 1–4.8)
LYMPHOCYTES NFR BLD: 24.1 % (ref 18–48)
MAGNESIUM SERPL-MCNC: 2.3 MG/DL (ref 1.6–2.6)
MCH RBC QN AUTO: 30.5 PG (ref 27–31)
MCHC RBC AUTO-ENTMCNC: 34.1 G/DL (ref 32–36)
MCV RBC AUTO: 89 FL (ref 82–98)
MONOCYTES # BLD AUTO: 0.4 K/UL (ref 0.3–1)
MONOCYTES NFR BLD: 13.4 % (ref 4–15)
NEUTROPHILS # BLD AUTO: 1.9 K/UL (ref 1.8–7.7)
NEUTROPHILS NFR BLD: 59.7 % (ref 38–73)
NRBC BLD-RTO: 0 /100 WBC
PLATELET # BLD AUTO: 190 K/UL (ref 150–450)
PMV BLD AUTO: 11 FL (ref 9.2–12.9)
POCT GLUCOSE: 169 MG/DL (ref 70–110)
POCT GLUCOSE: 219 MG/DL (ref 70–110)
POCT GLUCOSE: 222 MG/DL (ref 70–110)
POCT GLUCOSE: 257 MG/DL (ref 70–110)
POTASSIUM SERPL-SCNC: 3.7 MMOL/L (ref 3.5–5.1)
PROTHROMBIN TIME: 10.8 SEC (ref 9–12.5)
RBC # BLD AUTO: 4.17 M/UL (ref 4.6–6.2)
SODIUM SERPL-SCNC: 137 MMOL/L (ref 136–145)
WBC # BLD AUTO: 3.2 K/UL (ref 3.9–12.7)

## 2023-10-11 PROCEDURE — 36415 COLL VENOUS BLD VENIPUNCTURE: CPT | Performed by: INTERNAL MEDICINE

## 2023-10-11 PROCEDURE — 25000003 PHARM REV CODE 250: Performed by: INTERNAL MEDICINE

## 2023-10-11 PROCEDURE — 85610 PROTHROMBIN TIME: CPT | Performed by: PHYSICIAN ASSISTANT

## 2023-10-11 PROCEDURE — 99233 SBSQ HOSP IP/OBS HIGH 50: CPT | Mod: ,,, | Performed by: PHYSICIAN ASSISTANT

## 2023-10-11 PROCEDURE — 36415 COLL VENOUS BLD VENIPUNCTURE: CPT | Performed by: PHYSICIAN ASSISTANT

## 2023-10-11 PROCEDURE — 83735 ASSAY OF MAGNESIUM: CPT | Performed by: INTERNAL MEDICINE

## 2023-10-11 PROCEDURE — 21400001 HC TELEMETRY ROOM

## 2023-10-11 PROCEDURE — 85730 THROMBOPLASTIN TIME PARTIAL: CPT | Performed by: PHYSICIAN ASSISTANT

## 2023-10-11 PROCEDURE — 25000242 PHARM REV CODE 250 ALT 637 W/ HCPCS: Performed by: INTERNAL MEDICINE

## 2023-10-11 PROCEDURE — 85025 COMPLETE CBC W/AUTO DIFF WBC: CPT | Performed by: INTERNAL MEDICINE

## 2023-10-11 PROCEDURE — 99233 PR SUBSEQUENT HOSPITAL CARE,LEVL III: ICD-10-PCS | Mod: ,,, | Performed by: PHYSICIAN ASSISTANT

## 2023-10-11 PROCEDURE — 80048 BASIC METABOLIC PNL TOTAL CA: CPT | Performed by: INTERNAL MEDICINE

## 2023-10-11 PROCEDURE — 63600175 PHARM REV CODE 636 W HCPCS: Performed by: INTERNAL MEDICINE

## 2023-10-11 PROCEDURE — 85730 THROMBOPLASTIN TIME PARTIAL: CPT | Mod: 91 | Performed by: INTERNAL MEDICINE

## 2023-10-11 PROCEDURE — 63600175 PHARM REV CODE 636 W HCPCS: Performed by: PHYSICIAN ASSISTANT

## 2023-10-11 RX ORDER — POTASSIUM CHLORIDE 20 MEQ/1
20 TABLET, EXTENDED RELEASE ORAL 2 TIMES DAILY
Status: DISCONTINUED | OUTPATIENT
Start: 2023-10-11 | End: 2023-10-12 | Stop reason: HOSPADM

## 2023-10-11 RX ORDER — HEPARIN SODIUM,PORCINE/D5W 25000/250
0-40 INTRAVENOUS SOLUTION INTRAVENOUS CONTINUOUS
Status: DISCONTINUED | OUTPATIENT
Start: 2023-10-11 | End: 2023-10-12 | Stop reason: HOSPADM

## 2023-10-11 RX ORDER — FUROSEMIDE 10 MG/ML
40 INJECTION INTRAMUSCULAR; INTRAVENOUS
Status: DISCONTINUED | OUTPATIENT
Start: 2023-10-11 | End: 2023-10-12 | Stop reason: HOSPADM

## 2023-10-11 RX ORDER — POTASSIUM CHLORIDE 20 MEQ/1
40 TABLET, EXTENDED RELEASE ORAL EVERY 4 HOURS
Status: COMPLETED | OUTPATIENT
Start: 2023-10-11 | End: 2023-10-11

## 2023-10-11 RX ADMIN — MUPIROCIN: 20 OINTMENT TOPICAL at 09:10

## 2023-10-11 RX ADMIN — INSULIN ASPART 3 UNITS: 100 INJECTION, SOLUTION INTRAVENOUS; SUBCUTANEOUS at 10:10

## 2023-10-11 RX ADMIN — MILRINONE LACTATE IN DEXTROSE 0.38 MCG/KG/MIN: 200 INJECTION, SOLUTION INTRAVENOUS at 05:10

## 2023-10-11 RX ADMIN — POTASSIUM CHLORIDE 40 MEQ: 1500 TABLET, EXTENDED RELEASE ORAL at 09:10

## 2023-10-11 RX ADMIN — EMPAGLIFLOZIN 10 MG: 10 TABLET, FILM COATED ORAL at 09:10

## 2023-10-11 RX ADMIN — POTASSIUM CHLORIDE 20 MEQ: 1500 TABLET, EXTENDED RELEASE ORAL at 10:10

## 2023-10-11 RX ADMIN — METOPROLOL SUCCINATE 25 MG: 25 TABLET, EXTENDED RELEASE ORAL at 09:10

## 2023-10-11 RX ADMIN — MILRINONE LACTATE IN DEXTROSE 0.38 MCG/KG/MIN: 200 INJECTION, SOLUTION INTRAVENOUS at 04:10

## 2023-10-11 RX ADMIN — FUROSEMIDE 40 MG: 10 INJECTION, SOLUTION INTRAMUSCULAR; INTRAVENOUS at 09:10

## 2023-10-11 RX ADMIN — POTASSIUM CHLORIDE 40 MEQ: 1500 TABLET, EXTENDED RELEASE ORAL at 02:10

## 2023-10-11 RX ADMIN — HEPARIN SODIUM 12 UNITS/KG/HR: 10000 INJECTION, SOLUTION INTRAVENOUS at 12:10

## 2023-10-11 RX ADMIN — INSULIN ASPART 4 UNITS: 100 INJECTION, SOLUTION INTRAVENOUS; SUBCUTANEOUS at 12:10

## 2023-10-11 RX ADMIN — FAMOTIDINE 20 MG: 20 TABLET ORAL at 09:10

## 2023-10-11 RX ADMIN — FUROSEMIDE 40 MG: 10 INJECTION, SOLUTION INTRAMUSCULAR; INTRAVENOUS at 10:10

## 2023-10-11 RX ADMIN — INSULIN ASPART 4 UNITS: 100 INJECTION, SOLUTION INTRAVENOUS; SUBCUTANEOUS at 04:10

## 2023-10-11 RX ADMIN — AMIODARONE HYDROCHLORIDE 400 MG: 200 TABLET ORAL at 09:10

## 2023-10-11 RX ADMIN — CLOPIDOGREL BISULFATE 75 MG: 75 TABLET ORAL at 09:10

## 2023-10-11 RX ADMIN — ASPIRIN 81 MG: 81 TABLET, COATED ORAL at 09:10

## 2023-10-11 RX ADMIN — ATORVASTATIN CALCIUM 40 MG: 40 TABLET, FILM COATED ORAL at 09:10

## 2023-10-11 NOTE — PLAN OF CARE
SIBR rounds completed at bedside with CM, Pt experience, and charge nurse. POC discussed and all questions and concerns addressed.  Currently pending medical clearance for Dc home with family.  Pt verbalized understanding. No additional needs at this time. Pt instructed to call for any additional questions. Care team will continue to follow.

## 2023-10-11 NOTE — ASSESSMENT & PLAN NOTE
-Continue home meds.  Euvolemic    10/9/23  -Continue Toprol XL  -Entresto held due to bumped creatinine    10/10/23  -  -Continue Toprol XL  -Creatinine continues to trend upward; concerns for cardio-renal syndrome; low cardiac output   -LA WNL  -RHC to delineate volume status    10/11/23  -RHC yesterday showed mod-severe pulmonary HTN, low cardiac output, elevated LVEDP  -Started on milrinone  -Diurese and assess response  -Will try to rebuild CHF regimen pending creatinine trend

## 2023-10-11 NOTE — ASSESSMENT & PLAN NOTE
Continue with beta-blocker from home.    Start IV amiodarone for 24 hours and then switch to 400 mg b.i.d for 1 week then 200 mg daily.  ICD interrogation reviewed.  Does not appear to have lead issues, sometimes ICD vibration happens if there is a lead fracture.    Chest x-ray does not show any lead fracture.  Battery life 3.5 years  Reviewed his ICD interrogation lead impedance is good    10.8.2023  Reviewed his 10 events.  He had 20 beats run of V-tach at 1:00 a.m..  Review of his ICD interrogation showed more than 2000 episodes of nonsustained V-tach in the last few months.  He is on carvedilol at home.  Currently blood pressure running low for resume and his carvedilol.  Once BP allows will start him on low-dose Toprol 25 mg.    We will hold his isosorbide for now and the Lasix  Will amiodarone IV to p.o. 400 mg b.i.d. for 1 week then 200 mg daily.    10/9/23  -Stable overnight, occasional PVC's noted on monitor this AM  -Continue amiodarone  -Continue Toprol XL  -Replete K > 4, keep Mg > 2  -EP consulted    10/10/23  -Stable overnight  -Continue amiodarone, Toprol XL  -MPI stress test planned but cancelled as patient had caffeine    10/11/23  -No VT overnight  -Continue amiodarone, Toprol XL  -Keep K and Mg WNL

## 2023-10-11 NOTE — SUBJECTIVE & OBJECTIVE
Review of Systems   Constitutional: Negative.   HENT: Negative.     Eyes: Negative.    Cardiovascular: Negative.    Respiratory: Negative.     Endocrine: Negative.    Hematologic/Lymphatic: Negative.    Skin: Negative.    Musculoskeletal: Negative.    Gastrointestinal: Negative.    Genitourinary: Negative.    Neurological: Negative.    Psychiatric/Behavioral: Negative.     Allergic/Immunologic: Negative.      Objective:     Vital Signs (Most Recent):  Temp: 98.1 °F (36.7 °C) (10/11/23 1210)  Pulse: 67 (10/11/23 1210)  Resp: 18 (10/11/23 1210)  BP: (!) 104/59 (10/11/23 1210)  SpO2: 95 % (10/11/23 1210) Vital Signs (24h Range):  Temp:  [97 °F (36.1 °C)-98.7 °F (37.1 °C)] 98.1 °F (36.7 °C)  Pulse:  [50-80] 67  Resp:  [16-18] 18  SpO2:  [95 %-98 %] 95 %  BP: ()/(54-73) 104/59     Weight: 81.7 kg (180 lb 1.9 oz)  Body mass index is 24.43 kg/m².     SpO2: 95 %         Intake/Output Summary (Last 24 hours) at 10/11/2023 1443  Last data filed at 10/11/2023 0810  Gross per 24 hour   Intake 240 ml   Output 600 ml   Net -360 ml       Lines/Drains/Airways       Peripheral Intravenous Line  Duration                  Peripheral IV - Single Lumen 10/07/23 1109 20 G Left Forearm 4 days         Peripheral IV - Single Lumen 10/11/23 1210 22 G Anterior;Left Hand <1 day                       Physical Exam  Vitals and nursing note reviewed.   Constitutional:       General: He is not in acute distress.     Appearance: Normal appearance. He is well-developed. He is not diaphoretic.   HENT:      Head: Normocephalic and atraumatic.   Eyes:      General:         Right eye: No discharge.         Left eye: No discharge.      Pupils: Pupils are equal, round, and reactive to light.   Neck:      Thyroid: No thyromegaly.      Vascular: No JVD.      Trachea: No tracheal deviation.   Cardiovascular:      Rate and Rhythm: Normal rate and regular rhythm.      Heart sounds: Normal heart sounds, S1 normal and S2 normal. No murmur  "heard.  Pulmonary:      Effort: Pulmonary effort is normal. No respiratory distress.      Breath sounds: Normal breath sounds. No wheezing or rales.   Abdominal:      General: There is no distension.      Palpations: Abdomen is soft.      Tenderness: There is no rebound.   Musculoskeletal:      Cervical back: Neck supple.      Right lower leg: No edema.      Left lower leg: No edema.   Skin:     General: Skin is warm and dry.      Findings: No erythema.   Neurological:      Mental Status: He is alert and oriented to person, place, and time.   Psychiatric:         Mood and Affect: Mood normal.         Behavior: Behavior normal.         Thought Content: Thought content normal.            Significant Labs: CMP   Recent Labs   Lab 10/10/23  0627 10/11/23  0620    137   K 3.6 3.7   CL 99 101   CO2 27 22*   * 188*   BUN 40* 41*   CREATININE 2.5* 2.4*   CALCIUM 9.2 9.1   ANIONGAP 13 14   , CBC   Recent Labs   Lab 10/10/23  0627 10/11/23  0620   WBC 3.37* 3.20*   HGB 13.3* 12.7*   HCT 39.3* 37.2*    190   , Troponin No results for input(s): "TROPONINI" in the last 48 hours., and All pertinent lab results from the last 24 hours have been reviewed.    Significant Imaging: Echocardiogram: Transthoracic echo (TTE) complete (Cupid Only):   Results for orders placed or performed during the hospital encounter of 10/07/23   Echo   Result Value Ref Range    BSA 2.01 m2    LVOT stroke volume 36.56 cm3    LVIDd 6.60 (A) 3.5 - 6.0 cm    LV Systolic Volume 189.69 mL    LV Systolic Volume Index 93.9 mL/m2    LVIDs 6.14 (A) 2.1 - 4.0 cm    LV Diastolic Volume 223.24 mL    LV Diastolic Volume Index 110.51 mL/m2    IVS 0.78 0.6 - 1.1 cm    LVOT diameter 1.90 cm    LVOT area 2.8 cm2    FS 7 (A) 28 - 44 %    Left Ventricle Relative Wall Thickness 0.26 cm    Posterior Wall 0.86 0.6 - 1.1 cm    LV mass 226.89 g    LV Mass Index 112 g/m2    MV Peak E Zeferino 0.97 m/s    TDI LATERAL 0.08 m/s    TDI SEPTAL 0.02 m/s    E/E' ratio " 19.40 m/s    MV Peak A Zeferino 0.36 m/s    TR Max Zeferino 2.59 m/s    E/A ratio 2.69     IVRT 88.49 msec    E wave deceleration time 178.90 msec    LV SEPTAL E/E' RATIO 48.50 m/s    LV LATERAL E/E' RATIO 12.13 m/s    LVOT peak zeferino 0.73 m/s    Left Ventricular Outflow Tract Mean Velocity 0.53 cm/s    Left Ventricular Outflow Tract Mean Gradient 1.27 mmHg    LA size 4.39 cm    Left Atrium Minor Axis 6.97 cm    Left Atrium Major Axis 6.91 cm    LA volume (mod) 111.16 cm3    LA Volume Index (Mod) 55.0 mL/m2    RVDD 2.76 cm    RVOT peak VTI 19.1 cm    TAPSE 1.80 cm    RA Major Axis 5.02 cm    AV regurgitation pressure 1/2 time 458.377666140198258 ms    AR Max Zeferino 2.70 m/s    AV mean gradient 3 mmHg    AV peak gradient 5 mmHg    Ao peak zeferino 1.15 m/s    Ao VTI 23.20 cm    LVOT peak VTI 12.90 cm    AV valve area 1.58 cm²    AV Velocity Ratio 0.63     AV index (prosthetic) 0.56     ALLAN by Velocity Ratio 1.80 cm²    MV stenosis pressure 1/2 time 51.88 ms    MV valve area p 1/2 method 4.24 cm2    Triscuspid Valve Regurgitation Peak Gradient 27 mmHg    PV mean gradient 2 mmHg    PV PEAK VELOCITY 0.78 m/s    PV peak gradient 2 mmHg    Pulmonary Valve Mean Velocity 0.56 m/s    RVOT peak zeferino 0.75 m/s    Ao root annulus 3.29 cm    STJ 2.90 cm    Ascending aorta 2.79 cm    Mean e' 0.05 m/s    ZLVIDS 4.11     ZLVIDD 1.03     LA Volume Index 59.0 mL/m2    LA volume 119.12 cm3    LA WIDTH 4.6 cm    RA Width 4.0 cm    TV resting pulmonary artery pressure 35 mmHg    RV TB RVSP 11 mmHg    Est. RA pres 8 mmHg    Narrative      Left Ventricle: The left ventricle is severely dilated. Wall is thinner   than normal. Severe global hypokinesis present. There is severely reduced   systolic function with a visually estimated ejection fraction of 15 - 20%.   Grade III diastolic dysfunction.    Left Atrium: Left atrium is severely dilated.    Right Ventricle: Normal right ventricular cavity size. Wall thickness   is normal. Right ventricle wall motion  is  normal. Systolic function is   normal. Pacemaker lead present in the ventricle.    Aortic Valve: There is moderate aortic regurgitation.    Mitral Valve: There is mild to moderate regurgitation.    Tricuspid Valve: There is mild regurgitation.    IVC/SVC: Intermediate venous pressure at 8 mmHg.     , EKG: Reviewed, and X-Ray: CXR: X-Ray Chest 1 View (CXR):   Results for orders placed or performed during the hospital encounter of 10/07/23   X-Ray Chest 1 View    Narrative    EXAMINATION:  XR CHEST 1 VIEW    CLINICAL HISTORY:  Encounter for adjustment and management of automatic implantable cardiac defibrillator    FINDINGS:  Single view of the chest.  Comparison 02/07/2018.  Left-sided generator and defibrillator wires demonstrates similar configuration.    Cardiac silhouette is normal.  The lungs demonstrate no evidence of active disease.  No evidence of pleural effusion or pneumothorax.  Bones appear intact.  Moderate degenerative changes and moderate atherosclerotic disease.  Sternotomy wires are intact.      Impression    No acute process seen.      Electronically signed by: Pj Velarde MD  Date:    10/07/2023  Time:    10:29    and X-Ray Chest PA and Lateral (CXR): No results found for this visit on 10/07/23.

## 2023-10-11 NOTE — ASSESSMENT & PLAN NOTE
-Monitor    10/10/23  -RHC today by Dr. Chou    10/11/23  -Cardiorenal, assess response to IV diuresis

## 2023-10-11 NOTE — CONSULTS
Food & Nutrition Education     Diet Education: Cardiac Nutrition Therapy  Time Spent: 45 minutes  Learners: Patient. wife     Nutrition Education provided with handouts:  Cardiac-TLC Nutrition Therapy, Low-Sodium Nutrition Therapy ([email or web address])     Comments:  62 y/o male with PMHx of CABG, CAD, HTN, HLD and defibrillator developed near syncope 4 days ago and just as he was passing out his defibrillator shocked him and he fully recovered. He did not seek help. This am he noticed a buzzing sound and came to get it checked.     The pt and his wife states they attempt to consume a healthy dietary pattern. However, after further discussion of what their dietary practices included, the pt is in need of additional nutrition education.    RD educated patient on low sodium, general healthful diet r/t recent hospital diagnosis. Recommended a well-balanced diet with a variety of fresh foods, fruits, and vegetables (5 cups/day), whole grains (3 oz/day), and fat-free or low-fat dairy. Discussed reading food packages, food labels, and nutrition facts labels to identify nutrient content of foods.     Discussed the importance of limiting sodium to less than 2,000 mg per day. Recommended salt free seasonings and other herbs and spices in meals to enhance flavor without additional sodium.     Discussed dietary sources of cholesterol, the importance of incorporating healthy fats into the diet, and avoiding saturated and trans fats for heart health. For a generally healthy diet, aim for total fat less than 25-35% of calories. Discussed alternative vegetable protein options for some of her meal throughout the week (Beans, peas, and lentils)     Discussed the importance of fiber (especially soluble fiber), dietary sources, and a goal intake of >20-30g/day.    Educated patient on dietary sources of carbohydrates, carb counting and daily consistent carbohydrate intake. Discussed the importance of carbohydrates in the diet, but  with diabetes, focusing on consistent carb intake throughout the day with emphasis on protein and fiber. RD will continue to follow.     NFPE not performed, pt appears well nourished.  All questions and concerns answered.  Provided handout with dietitian's contact information.  *Please re-consult as needed.  Thank You!  Ulisses Grullon, Registration Eligible, Provisional LDN

## 2023-10-11 NOTE — PROGRESS NOTES
[facility]  Cardiology  Progress Note    Patient Name: Radha Abbott  MRN: [id number]  Admission Date: 10/7/2023  Hospital Length of Stay: 4 days  Code Status: Full Code   Attending Physician: Mery Calvo MD   Primary Care Physician: Vasu Kong MD  Expected Discharge Date: 10/10/2023  Principal Problem:Ventricular tachycardia    Subjective:   HPI:  63 yr old with hx of CABG, CAD, HTN, HLD and defibrillator developed near syncope 4 days ago and just as he was passing out his defibrillator shocked him and he fully recovered. He did not seek help. This am he noticed a buzzing sound and came to get it checked. He has family at bedside. He is asymptomatic.         Currently feels well.  Will start him on amiodarone.  Reviewed his VFib episodes scan in the system.  He has a remote history of CABG back in 2009 since then with congestive heart failure.  States that he had his ICD done at that time it is a [employer] device.  Denies any chest pain or dyspnea at the moment.      Hospital Course:   No more shocks overnight.  However his monitor showed 1 episode of nonsustained V-tach for 20 beats 1:00 a.m..    10/9/23-Patient seen and examined today, sitting up in bedside chair. Feels well. No CV complaints. Denies CP/SOB. Labs reviewed. K 3.3, being repleted. Mg 2.2. Creatinine 2.4. Will need ischemic workup. EP also consulted.    10/10/23-Patient seen and examined today, sitting up on bedside couch. Feels ok. . Creatinine with upward trend. LA WNL. RHC planned today. MPI stress test initially planned but patient had caffeine this AM.    10/11/23-Patient seen and examined today, s/p RHC which showed moderate to severe pulmonary HTN, elevated LVEDP, low cardiac output. Started on milrinone overnight, feeling less SOB. No CP symptoms. Runs of PAF overnight. Creatinine 2.4. IV Lasix initiated, will assess response.               Review of Systems   Constitutional: Negative.   HENT: Negative.      Eyes: Negative.    Cardiovascular: Negative.    Respiratory: Negative.     Endocrine: Negative.    Hematologic/Lymphatic: Negative.    Skin: Negative.    Musculoskeletal: Negative.    Gastrointestinal: Negative.    Genitourinary: Negative.    Neurological: Negative.    Psychiatric/Behavioral: Negative.     Allergic/Immunologic: Negative.      Objective:     Vital Signs (Most Recent):  Temp: 98.1 °F (36.7 °C) (10/11/23 1210)  Pulse: 67 (10/11/23 1210)  Resp: 18 (10/11/23 1210)  BP: (!) 104/59 (10/11/23 1210)  SpO2: 95 % (10/11/23 1210) Vital Signs (24h Range):  Temp:  [97 °F (36.1 °C)-98.7 °F (37.1 °C)] 98.1 °F (36.7 °C)  Pulse:  [50-80] 67  Resp:  [16-18] 18  SpO2:  [95 %-98 %] 95 %  BP: ()/(54-73) 104/59     Weight: 81.7 kg (180 lb 1.9 oz)  Body mass index is 24.43 kg/m².     SpO2: 95 %         Intake/Output Summary (Last 24 hours) at 10/11/2023 1443  Last data filed at 10/11/2023 0810  Gross per 24 hour   Intake 240 ml   Output 600 ml   Net -360 ml       Lines/Drains/Airways       Peripheral Intravenous Line  Duration                  Peripheral IV - Single Lumen 10/07/23 1109 20 G Left Forearm 4 days         Peripheral IV - Single Lumen 10/11/23 1210 22 G Anterior;Left Hand <1 day                       Physical Exam  Vitals and nursing note reviewed.   Constitutional:       General: He is not in acute distress.     Appearance: Normal appearance. He is well-developed. He is not diaphoretic.   HENT:      Head: Normocephalic and atraumatic.   Eyes:      General:         Right eye: No discharge.         Left eye: No discharge.      Pupils: Pupils are equal, round, and reactive to light.   Neck:      Thyroid: No thyromegaly.      Vascular: No JVD.      Trachea: No tracheal deviation.   Cardiovascular:      Rate and Rhythm: Normal rate and regular rhythm.      Heart sounds: Normal heart sounds, S1 normal and S2 normal. No murmur heard.  Pulmonary:      Effort: Pulmonary effort is normal. No respiratory  "distress.      Breath sounds: Normal breath sounds. No wheezing or rales.   Abdominal:      General: There is no distension.      Palpations: Abdomen is soft.      Tenderness: There is no rebound.   Musculoskeletal:      Cervical back: Neck supple.      Right lower leg: No edema.      Left lower leg: No edema.   Skin:     General: Skin is warm and dry.      Findings: No erythema.   Neurological:      Mental Status: He is alert and oriented to person, place, and time.   Psychiatric:         Mood and Affect: Mood normal.         Behavior: Behavior normal.         Thought Content: Thought content normal.            Significant Labs: CMP   Recent Labs   Lab 10/10/23  0627 10/11/23  0620    137   K 3.6 3.7   CL 99 101   CO2 27 22*   * 188*   BUN 40* 41*   CREATININE 2.5* 2.4*   CALCIUM 9.2 9.1   ANIONGAP 13 14   , CBC   Recent Labs   Lab 10/10/23  0627 10/11/23  0620   WBC 3.37* 3.20*   HGB 13.3* 12.7*   HCT 39.3* 37.2*    190   , Troponin No results for input(s): "TROPONINI" in the last 48 hours., and All pertinent lab results from the last 24 hours have been reviewed.    Significant Imaging: Echocardiogram: Transthoracic echo (TTE) complete (Cupid Only):   Results for orders placed or performed during the hospital encounter of 10/07/23   Echo   Result Value Ref Range    BSA 2.01 m2    LVOT stroke volume 36.56 cm3    LVIDd 6.60 (A) 3.5 - 6.0 cm    LV Systolic Volume 189.69 mL    LV Systolic Volume Index 93.9 mL/m2    LVIDs 6.14 (A) 2.1 - 4.0 cm    LV Diastolic Volume 223.24 mL    LV Diastolic Volume Index 110.51 mL/m2    IVS 0.78 0.6 - 1.1 cm    LVOT diameter 1.90 cm    LVOT area 2.8 cm2    FS 7 (A) 28 - 44 %    Left Ventricle Relative Wall Thickness 0.26 cm    Posterior Wall 0.86 0.6 - 1.1 cm    LV mass 226.89 g    LV Mass Index 112 g/m2    MV Peak E Zeferino 0.97 m/s    TDI LATERAL 0.08 m/s    TDI SEPTAL 0.02 m/s    E/E' ratio 19.40 m/s    MV Peak A Zeferino 0.36 m/s    TR Max Zeferino 2.59 m/s    E/A ratio 2.69  "    IVRT 88.49 msec    E wave deceleration time 178.90 msec    LV SEPTAL E/E' RATIO 48.50 m/s    LV LATERAL E/E' RATIO 12.13 m/s    LVOT peak zeferino 0.73 m/s    Left Ventricular Outflow Tract Mean Velocity 0.53 cm/s    Left Ventricular Outflow Tract Mean Gradient 1.27 mmHg    LA size 4.39 cm    Left Atrium Minor Axis 6.97 cm    Left Atrium Major Axis 6.91 cm    LA volume (mod) 111.16 cm3    LA Volume Index (Mod) 55.0 mL/m2    RVDD 2.76 cm    RVOT peak VTI 19.1 cm    TAPSE 1.80 cm    RA Major Axis 5.02 cm    AV regurgitation pressure 1/2 time 458.524666001495455 ms    AR Max Zeferino 2.70 m/s    AV mean gradient 3 mmHg    AV peak gradient 5 mmHg    Ao peak zeferino 1.15 m/s    Ao VTI 23.20 cm    LVOT peak VTI 12.90 cm    AV valve area 1.58 cm²    AV Velocity Ratio 0.63     AV index (prosthetic) 0.56     ALLAN by Velocity Ratio 1.80 cm²    MV stenosis pressure 1/2 time 51.88 ms    MV valve area p 1/2 method 4.24 cm2    Triscuspid Valve Regurgitation Peak Gradient 27 mmHg    PV mean gradient 2 mmHg    PV PEAK VELOCITY 0.78 m/s    PV peak gradient 2 mmHg    Pulmonary Valve Mean Velocity 0.56 m/s    RVOT peak zeferino 0.75 m/s    Ao root annulus 3.29 cm    STJ 2.90 cm    Ascending aorta 2.79 cm    Mean e' 0.05 m/s    ZLVIDS 4.11     ZLVIDD 1.03     LA Volume Index 59.0 mL/m2    LA volume 119.12 cm3    LA WIDTH 4.6 cm    RA Width 4.0 cm    TV resting pulmonary artery pressure 35 mmHg    RV TB RVSP 11 mmHg    Est. RA pres 8 mmHg    Narrative      Left Ventricle: The left ventricle is severely dilated. Wall is thinner   than normal. Severe global hypokinesis present. There is severely reduced   systolic function with a visually estimated ejection fraction of 15 - 20%.   Grade III diastolic dysfunction.    Left Atrium: Left atrium is severely dilated.    Right Ventricle: Normal right ventricular cavity size. Wall thickness   is normal. Right ventricle wall motion  is normal. Systolic function is   normal. Pacemaker lead present in the  ventricle.    Aortic Valve: There is moderate aortic regurgitation.    Mitral Valve: There is mild to moderate regurgitation.    Tricuspid Valve: There is mild regurgitation.    IVC/SVC: Intermediate venous pressure at 8 mmHg.     , EKG: Reviewed, and X-Ray: CXR: X-Ray Chest 1 View (CXR):   Results for orders placed or performed during the hospital encounter of 10/07/23   X-Ray Chest 1 View    Narrative    EXAMINATION:  XR CHEST 1 VIEW    CLINICAL HISTORY:  Encounter for adjustment and management of automatic implantable cardiac defibrillator    FINDINGS:  Single view of the chest.  Comparison 02/07/2018.  Left-sided generator and defibrillator wires demonstrates similar configuration.    Cardiac silhouette is normal.  The lungs demonstrate no evidence of active disease.  No evidence of pleural effusion or pneumothorax.  Bones appear intact.  Moderate degenerative changes and moderate atherosclerotic disease.  Sternotomy wires are intact.      Impression    No acute process seen.      Electronically signed by: Pj Velarde MD  Date:    10/07/2023  Time:    10:29    and X-Ray Chest PA and Lateral (CXR): No results found for this visit on 10/07/23.    Assessment and Plan:   Patient who presents with VT s/p AICD shock. RHC yesterday showed mod-severe pulm HTN, elevated wedge, low cardiac output. Placed on milrinone, breathing improved. Diurese. Will try to rebuild CHF regimen. PAF noted overnight, heparin gtt initiated.     * Ventricular tachycardia  Continue with beta-blocker from home.    Start IV amiodarone for 24 hours and then switch to 400 mg b.i.d for 1 week then 200 mg daily.  ICD interrogation reviewed.  Does not appear to have lead issues, sometimes ICD vibration happens if there is a lead fracture.    Chest x-ray does not show any lead fracture.  Battery life 3.5 years  Reviewed his ICD interrogation lead impedance is good    10.8.2023  Reviewed his 10 events.  He had 20 beats run of V-tach at 1:00  a.m..  Review of his ICD interrogation showed more than 2000 episodes of nonsustained V-tach in the last few months.  He is on carvedilol at home.  Currently blood pressure running low for resume and his carvedilol.  Once BP allows will start him on low-dose Toprol 25 mg.    We will hold his isosorbide for now and the Lasix  Will amiodarone IV to p.o. 400 mg b.i.d. for 1 week then 200 mg daily.    10/9/23  -Stable overnight, occasional PVC's noted on monitor this AM  -Continue amiodarone  -Continue Toprol XL  -Replete K > 4, keep Mg > 2  -EP consulted    10/10/23  -Stable overnight  -Continue amiodarone, Toprol XL  -MPI stress test planned but cancelled as patient had caffeine    10/11/23  -No VT overnight  -Continue amiodarone, Toprol XL  -Keep K and Mg WNL    PAF (paroxysmal atrial fibrillation)  -PAF noted overnight on telemetry  -Continue Toprol XL  -Heparin gtt initiated  -Monitor    IVÁN (acute kidney injury)  -Monitor    10/10/23  -RHC today by Dr. Chou    10/11/23  -Cardiorenal, assess response to IV diuresis     Hypokalemia  -Replete K to > 4    HFrEF (heart failure with reduced ejection fraction)  -Continue home meds.  Euvolemic    10/9/23  -Continue Toprol XL  -Entresto held due to bumped creatinine    10/10/23  -  -Continue Toprol XL  -Creatinine continues to trend upward; concerns for cardio-renal syndrome; low cardiac output   -LA WNL  -RHC to delineate volume status    10/11/23  -RHC yesterday showed mod-severe pulmonary HTN, low cardiac output, elevated LVEDP  -Started on milrinone  -Diurese and assess response  -Will try to rebuild CHF regimen pending creatinine trend         CAD (coronary artery disease)  Chest pain-free.  Troponin elevation possibly due to his VFib shocked.  Continue home medications.  Check magnesium and electrolytes trend trop    10/9/23  -Repeat troponin pending  -Continue OMT-ASA, Plavix, BB, statin  -Will need ischemic workup    10/10/23  -See above        VTE Risk  Mitigation (From admission, onward)         Ordered     heparin 25,000 units in dextrose 5% (100 units/ml) IV bolus from bag - ADDITIONAL PRN BOLUS - 60 units/kg  As needed (PRN)        Question:  Heparin Infusion Adjustment (DO NOT MODIFY ANSWER)  Answer:  [email or web address]\Images\Pharmacy\HeparinInfusions\heparin LOW INTENSITY nomogram for [facility] [id number]    10/11/23 1028     heparin 25,000 units in dextrose 5% (100 units/ml) IV bolus from bag - ADDITIONAL PRN BOLUS - 30 units/kg  As needed (PRN)        Question:  Heparin Infusion Adjustment (DO NOT MODIFY ANSWER)  Answer:  [email or web address]\epic\Images\Pharmacy\HeparinInfusions\heparin LOW INTENSITY nomogram for [facility] [id number]    10/11/23 1028     heparin 25,000 units in dextrose 5% 250 mL (100 units/mL) infusion LOW INTENSITY nomogram - [facility]  Continuous        Question:  Begin at (units/kg/hr)  Answer:  12    10/11/23 1028     IP VTE LOW RISK PATIENT  Once         10/07/23 1352     Place sequential compression device  Until discontinued         10/07/23 1352                Kayley Morris PA-C  Cardiology  [facility]

## 2023-10-12 ENCOUNTER — HOSPITAL ENCOUNTER (INPATIENT)
Facility: HOSPITAL | Age: 63
LOS: 4 days | Discharge: HOME HEALTH | DRG: 286 | End: 2023-10-16
Attending: INTERNAL MEDICINE | Admitting: INTERNAL MEDICINE
Payer: MEDICAID

## 2023-10-12 VITALS
HEART RATE: 77 BPM | WEIGHT: 180.13 LBS | RESPIRATION RATE: 12 BRPM | OXYGEN SATURATION: 97 % | BODY MASS INDEX: 24.4 KG/M2 | DIASTOLIC BLOOD PRESSURE: 58 MMHG | SYSTOLIC BLOOD PRESSURE: 109 MMHG | HEIGHT: 72 IN | TEMPERATURE: 98 F

## 2023-10-12 DIAGNOSIS — I50.43 ACUTE ON CHRONIC COMBINED SYSTOLIC AND DIASTOLIC CHF, NYHA CLASS 4: ICD-10-CM

## 2023-10-12 DIAGNOSIS — I25.118 CORONARY ARTERY DISEASE OF NATIVE ARTERY OF NATIVE HEART WITH STABLE ANGINA PECTORIS: ICD-10-CM

## 2023-10-12 DIAGNOSIS — I47.20 VENTRICULAR TACHYCARDIA: ICD-10-CM

## 2023-10-12 DIAGNOSIS — I50.20 HFREF (HEART FAILURE WITH REDUCED EJECTION FRACTION): Primary | ICD-10-CM

## 2023-10-12 DIAGNOSIS — I50.9 HEART FAILURE: ICD-10-CM

## 2023-10-12 PROBLEM — I49.01 VENTRICULAR FIBRILLATION: Status: ACTIVE | Noted: 2023-10-12

## 2023-10-12 LAB
ANION GAP SERPL CALC-SCNC: 12 MMOL/L (ref 8–16)
ANION GAP SERPL CALC-SCNC: 15 MMOL/L (ref 8–16)
APTT PPP: 46.9 SEC (ref 21–32)
APTT PPP: 50.4 SEC (ref 21–32)
BASOPHILS # BLD AUTO: 0.03 K/UL (ref 0–0.2)
BASOPHILS NFR BLD: 0.9 % (ref 0–1.9)
BUN SERPL-MCNC: 42 MG/DL (ref 8–23)
BUN SERPL-MCNC: 43 MG/DL (ref 8–23)
CALCIUM SERPL-MCNC: 10.2 MG/DL (ref 8.7–10.5)
CALCIUM SERPL-MCNC: 9 MG/DL (ref 8.7–10.5)
CHLORIDE SERPL-SCNC: 102 MMOL/L (ref 95–110)
CHLORIDE SERPL-SCNC: 99 MMOL/L (ref 95–110)
CO2 SERPL-SCNC: 25 MMOL/L (ref 23–29)
CO2 SERPL-SCNC: 25 MMOL/L (ref 23–29)
CREAT SERPL-MCNC: 2.6 MG/DL (ref 0.5–1.4)
CREAT SERPL-MCNC: 2.8 MG/DL (ref 0.5–1.4)
DIFFERENTIAL METHOD: ABNORMAL
EOSINOPHIL # BLD AUTO: 0.1 K/UL (ref 0–0.5)
EOSINOPHIL NFR BLD: 2.4 % (ref 0–8)
ERYTHROCYTE [DISTWIDTH] IN BLOOD BY AUTOMATED COUNT: 13.4 % (ref 11.5–14.5)
EST. GFR  (NO RACE VARIABLE): 24.9 ML/MIN/1.73 M^2
EST. GFR  (NO RACE VARIABLE): 27 ML/MIN/1.73 M^2
ESTIMATED AVG GLUCOSE: 171 MG/DL (ref 68–131)
GLUCOSE SERPL-MCNC: 155 MG/DL (ref 70–110)
GLUCOSE SERPL-MCNC: 172 MG/DL (ref 70–110)
HBA1C MFR BLD: 7.6 % (ref 4–5.6)
HCT VFR BLD AUTO: 35.4 % (ref 40–54)
HGB BLD-MCNC: 12.3 G/DL (ref 14–18)
IMM GRANULOCYTES # BLD AUTO: 0.01 K/UL (ref 0–0.04)
IMM GRANULOCYTES NFR BLD AUTO: 0.3 % (ref 0–0.5)
LACTATE SERPL-SCNC: 1.5 MMOL/L (ref 0.5–2.2)
LYMPHOCYTES # BLD AUTO: 1.2 K/UL (ref 1–4.8)
LYMPHOCYTES NFR BLD: 35.4 % (ref 18–48)
MAGNESIUM SERPL-MCNC: 2.4 MG/DL (ref 1.6–2.6)
MAGNESIUM SERPL-MCNC: 2.6 MG/DL (ref 1.6–2.6)
MCH RBC QN AUTO: 30.7 PG (ref 27–31)
MCHC RBC AUTO-ENTMCNC: 34.7 G/DL (ref 32–36)
MCV RBC AUTO: 88 FL (ref 82–98)
MONOCYTES # BLD AUTO: 0.5 K/UL (ref 0.3–1)
MONOCYTES NFR BLD: 15.9 % (ref 4–15)
NEUTROPHILS # BLD AUTO: 1.5 K/UL (ref 1.8–7.7)
NEUTROPHILS NFR BLD: 45.1 % (ref 38–73)
NRBC BLD-RTO: 0 /100 WBC
PLATELET # BLD AUTO: 181 K/UL (ref 150–450)
PMV BLD AUTO: 10.6 FL (ref 9.2–12.9)
POCT GLUCOSE: 171 MG/DL (ref 70–110)
POCT GLUCOSE: 198 MG/DL (ref 70–110)
POCT GLUCOSE: 281 MG/DL (ref 70–110)
POTASSIUM SERPL-SCNC: 3.7 MMOL/L (ref 3.5–5.1)
POTASSIUM SERPL-SCNC: 4.4 MMOL/L (ref 3.5–5.1)
RBC # BLD AUTO: 4.01 M/UL (ref 4.6–6.2)
SODIUM SERPL-SCNC: 139 MMOL/L (ref 136–145)
SODIUM SERPL-SCNC: 139 MMOL/L (ref 136–145)
WBC # BLD AUTO: 3.33 K/UL (ref 3.9–12.7)

## 2023-10-12 PROCEDURE — 85730 THROMBOPLASTIN TIME PARTIAL: CPT | Performed by: INTERNAL MEDICINE

## 2023-10-12 PROCEDURE — 85730 THROMBOPLASTIN TIME PARTIAL: CPT | Mod: 91 | Performed by: PHYSICIAN ASSISTANT

## 2023-10-12 PROCEDURE — 25000242 PHARM REV CODE 250 ALT 637 W/ HCPCS: Performed by: INTERNAL MEDICINE

## 2023-10-12 PROCEDURE — 36415 COLL VENOUS BLD VENIPUNCTURE: CPT | Performed by: STUDENT IN AN ORGANIZED HEALTH CARE EDUCATION/TRAINING PROGRAM

## 2023-10-12 PROCEDURE — 25000003 PHARM REV CODE 250: Performed by: INTERNAL MEDICINE

## 2023-10-12 PROCEDURE — 83735 ASSAY OF MAGNESIUM: CPT | Mod: 91 | Performed by: STUDENT IN AN ORGANIZED HEALTH CARE EDUCATION/TRAINING PROGRAM

## 2023-10-12 PROCEDURE — 80048 BASIC METABOLIC PNL TOTAL CA: CPT | Performed by: INTERNAL MEDICINE

## 2023-10-12 PROCEDURE — 99233 SBSQ HOSP IP/OBS HIGH 50: CPT | Mod: ,,, | Performed by: INTERNAL MEDICINE

## 2023-10-12 PROCEDURE — 99233 PR SUBSEQUENT HOSPITAL CARE,LEVL III: ICD-10-PCS | Mod: ,,, | Performed by: INTERNAL MEDICINE

## 2023-10-12 PROCEDURE — 83735 ASSAY OF MAGNESIUM: CPT | Performed by: INTERNAL MEDICINE

## 2023-10-12 PROCEDURE — 20600001 HC STEP DOWN PRIVATE ROOM

## 2023-10-12 PROCEDURE — 83036 HEMOGLOBIN GLYCOSYLATED A1C: CPT | Performed by: STUDENT IN AN ORGANIZED HEALTH CARE EDUCATION/TRAINING PROGRAM

## 2023-10-12 PROCEDURE — 36415 COLL VENOUS BLD VENIPUNCTURE: CPT | Performed by: INTERNAL MEDICINE

## 2023-10-12 PROCEDURE — 63600175 PHARM REV CODE 636 W HCPCS: Performed by: INTERNAL MEDICINE

## 2023-10-12 PROCEDURE — 83605 ASSAY OF LACTIC ACID: CPT | Performed by: STUDENT IN AN ORGANIZED HEALTH CARE EDUCATION/TRAINING PROGRAM

## 2023-10-12 PROCEDURE — 63600175 PHARM REV CODE 636 W HCPCS: Performed by: PHYSICIAN ASSISTANT

## 2023-10-12 PROCEDURE — 80048 BASIC METABOLIC PNL TOTAL CA: CPT | Mod: 91 | Performed by: STUDENT IN AN ORGANIZED HEALTH CARE EDUCATION/TRAINING PROGRAM

## 2023-10-12 PROCEDURE — 85025 COMPLETE CBC W/AUTO DIFF WBC: CPT | Performed by: INTERNAL MEDICINE

## 2023-10-12 PROCEDURE — 63600175 PHARM REV CODE 636 W HCPCS: Performed by: STUDENT IN AN ORGANIZED HEALTH CARE EDUCATION/TRAINING PROGRAM

## 2023-10-12 RX ORDER — ATORVASTATIN CALCIUM 40 MG/1
80 TABLET, FILM COATED ORAL DAILY
Status: DISCONTINUED | OUTPATIENT
Start: 2023-10-13 | End: 2023-10-16 | Stop reason: HOSPADM

## 2023-10-12 RX ORDER — NAPROXEN SODIUM 220 MG/1
81 TABLET, FILM COATED ORAL DAILY
Status: DISCONTINUED | OUTPATIENT
Start: 2023-10-13 | End: 2023-10-16 | Stop reason: HOSPADM

## 2023-10-12 RX ORDER — DOBUTAMINE HYDROCHLORIDE 400 MG/100ML
5 INJECTION INTRAVENOUS CONTINUOUS
Status: DISCONTINUED | OUTPATIENT
Start: 2023-10-12 | End: 2023-10-16 | Stop reason: HOSPADM

## 2023-10-12 RX ORDER — CLOPIDOGREL BISULFATE 75 MG/1
75 TABLET ORAL DAILY
Status: DISCONTINUED | OUTPATIENT
Start: 2023-10-13 | End: 2023-10-16 | Stop reason: HOSPADM

## 2023-10-12 RX ORDER — INSULIN ASPART 100 [IU]/ML
0-5 INJECTION, SOLUTION INTRAVENOUS; SUBCUTANEOUS EVERY 6 HOURS PRN
Status: DISCONTINUED | OUTPATIENT
Start: 2023-10-12 | End: 2023-10-16 | Stop reason: HOSPADM

## 2023-10-12 RX ORDER — GLUCAGON 1 MG
1 KIT INJECTION
Status: DISCONTINUED | OUTPATIENT
Start: 2023-10-12 | End: 2023-10-16 | Stop reason: HOSPADM

## 2023-10-12 RX ADMIN — INSULIN ASPART 3 UNITS: 100 INJECTION, SOLUTION INTRAVENOUS; SUBCUTANEOUS at 11:10

## 2023-10-12 RX ADMIN — ASPIRIN 81 MG: 81 TABLET, COATED ORAL at 08:10

## 2023-10-12 RX ADMIN — INSULIN ASPART 2 UNITS: 100 INJECTION, SOLUTION INTRAVENOUS; SUBCUTANEOUS at 05:10

## 2023-10-12 RX ADMIN — HEPARIN SODIUM 12 UNITS/KG/HR: 10000 INJECTION, SOLUTION INTRAVENOUS at 07:10

## 2023-10-12 RX ADMIN — DOBUTAMINE HYDROCHLORIDE 5 MCG/KG/MIN: 400 INJECTION INTRAVENOUS at 09:10

## 2023-10-12 RX ADMIN — ATORVASTATIN CALCIUM 40 MG: 40 TABLET, FILM COATED ORAL at 08:10

## 2023-10-12 RX ADMIN — CLOPIDOGREL BISULFATE 75 MG: 75 TABLET ORAL at 08:10

## 2023-10-12 RX ADMIN — MILRINONE LACTATE IN DEXTROSE 0.38 MCG/KG/MIN: 200 INJECTION, SOLUTION INTRAVENOUS at 03:10

## 2023-10-12 RX ADMIN — POTASSIUM CHLORIDE 20 MEQ: 1500 TABLET, EXTENDED RELEASE ORAL at 08:10

## 2023-10-12 RX ADMIN — MILRINONE LACTATE IN DEXTROSE 0.38 MCG/KG/MIN: 200 INJECTION, SOLUTION INTRAVENOUS at 02:10

## 2023-10-12 RX ADMIN — EMPAGLIFLOZIN 10 MG: 10 TABLET, FILM COATED ORAL at 08:10

## 2023-10-12 RX ADMIN — FAMOTIDINE 20 MG: 20 TABLET ORAL at 08:10

## 2023-10-12 RX ADMIN — FUROSEMIDE 40 MG: 10 INJECTION, SOLUTION INTRAMUSCULAR; INTRAVENOUS at 08:10

## 2023-10-12 NOTE — PLAN OF CARE
[facility]  Discharge Reassessment    Primary Care Provider: Vasu Kong MD    Expected Discharge Date: 10/10/2023    Reassessment (most recent)       Discharge Reassessment - 10/12/23 0847          Discharge Reassessment    Assessment Type Discharge Planning Reassessment     Did the patient's condition or plan change since previous assessment? No     Discharge Plan discussed with: Patient     Communicated STANLEY with patient/caregiver Date not available/Unable to determine     Discharge Plan A Home with family

## 2023-10-12 NOTE — ASSESSMENT & PLAN NOTE
Patient with Paroxysmal (<7 days) atrial fibrillation which is controlled currently with Beta Blocker. Patient is currently in atrial fibrillation.[id number] Score: 2. HASBLED Score: . Anticoagulation indicated. Anticoagulation done with heparin.

## 2023-10-12 NOTE — ASSESSMENT & PLAN NOTE
-Continue home meds.  Euvolemic    10/9/23  -Continue Toprol XL  -Entresto held due to bumped creatinine    10/10/23  -  -Continue Toprol XL  -Creatinine continues to trend upward; concerns for cardio-renal syndrome; low cardiac output   -LA WNL  -RHC to delineate volume status    10/11/23  -RHC yesterday showed mod-severe pulmonary HTN, low cardiac output, elevated LVEDP  -Started on milrinone  -Diurese and assess response  -Will try to rebuild CHF regimen pending creatinine trend     10/12/2023  WILL TRANSFER TO [facility] FOR [facility] FAILURE MODALITY

## 2023-10-12 NOTE — SUBJECTIVE & OBJECTIVE
Interval History:  Patient feeling better today with less shortness of breath    Review of Systems   Constitutional:  Positive for fatigue.   Respiratory:  Negative for cough and shortness of breath.    Cardiovascular:  Negative for chest pain, palpitations and leg swelling.   All other systems reviewed and are negative.    Objective:     Vital Signs (Most Recent):  Temp: 97.9 °F (36.6 °C) (10/11/23 1734)  Pulse: 73 (10/11/23 1734)  Resp: 18 (10/11/23 1734)  BP: (!) 99/57 (10/11/23 1734)  SpO2: 100 % (10/11/23 1734) Vital Signs (24h Range):  Temp:  [97.9 °F (36.6 °C)-98.7 °F (37.1 °C)] 97.9 °F (36.6 °C)  Pulse:  [50-80] 73  Resp:  [16-18] 18  SpO2:  [95 %-100 %] 100 %  BP: ()/(54-73) 99/57     Weight: 81.7 kg (180 lb 1.9 oz)  Body mass index is 24.43 kg/m².    Intake/Output Summary (Last 24 hours) at 10/11/2023 1910  Last data filed at 10/11/2023 0810  Gross per 24 hour   Intake 240 ml   Output 600 ml   Net -360 ml         Physical Exam  Vitals reviewed.   Constitutional:       Appearance: Normal appearance.   HENT:      Head: Normocephalic and atraumatic.      Mouth/Throat:      Mouth: Mucous membranes are moist.      Pharynx: Oropharynx is clear.   Eyes:      Extraocular Movements: Extraocular movements intact.      Conjunctiva/sclera: Conjunctivae normal.   Cardiovascular:      Rate and Rhythm: Normal rate and regular rhythm.      Pulses: Normal pulses.      Heart sounds: Murmur heard.   Pulmonary:      Effort: Pulmonary effort is normal.      Breath sounds: Normal breath sounds.   Abdominal:      General: Bowel sounds are normal.      Palpations: Abdomen is soft.   Musculoskeletal:         General: Normal range of motion.      Cervical back: Normal range of motion and neck supple.   Skin:     General: Skin is warm and dry.   Neurological:      General: No focal deficit present.      Mental Status: He is alert and oriented to person, place, and time. Mental status is at baseline.   Psychiatric:         Mood  and Affect: Mood normal.         Behavior: Behavior normal.         Thought Content: Thought content normal.             Significant Labs: All pertinent labs within the past 24 hours have been reviewed.  CBC:   Recent Labs   Lab 10/10/23  0627 10/11/23  0620   WBC 3.37* 3.20*   HGB 13.3* 12.7*   HCT 39.3* 37.2*    190     CMP:   Recent Labs   Lab 10/10/23  0627 10/11/23  0620    137   K 3.6 3.7   CL 99 101   CO2 27 22*   * 188*   BUN 40* 41*   CREATININE 2.5* 2.4*   CALCIUM 9.2 9.1   ANIONGAP 13 14       Significant Imaging: I have reviewed all pertinent imaging results/findings within the past 24 hours.

## 2023-10-12 NOTE — ASSESSMENT & PLAN NOTE
Admitted to ICU, amiodarone IV transitioned to PO  ICD interrogation > 2000 episodes of NSVT in the last few months  Cardiology following consulted EP for wait to see recs in a.m.    EP his evaluated and we will continue await final recommendations

## 2023-10-12 NOTE — SUBJECTIVE & OBJECTIVE
Interval History: HE APPEARS COMFORTABLE NO SHORTNESS OF BREATH    Review of Systems   Constitutional: Negative for malaise/fatigue.   Eyes:  Negative for blurred vision.   Cardiovascular:  Negative for chest pain, claudication, cyanosis, dyspnea on exertion, irregular heartbeat, leg swelling, near-syncope, orthopnea, palpitations and paroxysmal nocturnal dyspnea.   Respiratory:  Negative for cough, hemoptysis and shortness of breath.    Hematologic/Lymphatic: Negative for bleeding problem. Does not bruise/bleed easily.   Skin:  Negative for dry skin and itching.   Musculoskeletal:  Negative for falls, muscle weakness and myalgias.   Gastrointestinal:  Negative for abdominal pain, diarrhea, heartburn, hematemesis, hematochezia and melena.   Genitourinary:  Negative for flank pain and hematuria.   Neurological:  Negative for dizziness, focal weakness, headaches, light-headedness, numbness, paresthesias, seizures and weakness.   Psychiatric/Behavioral:  Negative for altered mental status and memory loss. The patient is not nervous/anxious.    Allergic/Immunologic: Negative for hives.     Objective:     Vital Signs (Most Recent):  Temp: 98.1 °F (36.7 °C) (10/12/23 0718)  Pulse: 77 (10/12/23 0718)  Resp: 12 (10/12/23 0718)  BP: (!) 109/58 (10/12/23 0718)  SpO2: 97 % (10/12/23 0718) Vital Signs (24h Range):  Temp:  [97.8 °F (36.6 °C)-98.4 °F (36.9 °C)] 98.1 °F (36.7 °C)  Pulse:  [69-80] 77  Resp:  [12-19] 12  SpO2:  [95 %-100 %] 97 %  BP: ()/(57-66) 109/58     Weight: 81.7 kg (180 lb 1.9 oz)  Body mass index is 24.43 kg/m².     SpO2: 97 %         Intake/Output Summary (Last 24 hours) at 10/12/2023 1224  Last data filed at 10/12/2023 0901  Gross per 24 hour   Intake 378.59 ml   Output 825 ml   Net -446.41 ml       Lines/Drains/Airways       Peripheral Intravenous Line  Duration                  Peripheral IV - Single Lumen 10/07/23 1109 20 G Left Forearm 5 days         Peripheral IV - Single Lumen 10/11/23 1210 22 G  Anterior;Left Hand 1 day                       Physical Exam  Vitals and nursing note reviewed.   Constitutional:       General: He is not in acute distress.     Appearance: He is well-developed. He is not diaphoretic.   HENT:      Head: Normocephalic and atraumatic.   Eyes:      General:         Right eye: No discharge.         Left eye: No discharge.      Pupils: Pupils are equal, round, and reactive to light.   Neck:      Thyroid: No thyromegaly.      Vascular: No JVD.   Cardiovascular:      Rate and Rhythm: Normal rate and regular rhythm.      Pulses: Normal pulses and intact distal pulses.      Heart sounds: Normal heart sounds. No murmur heard.     No friction rub. No gallop.   Pulmonary:      Effort: Pulmonary effort is normal. No respiratory distress.      Breath sounds: Normal breath sounds. No wheezing or rales.      Comments: SCAR CABG WELL HEALED.  AICD SITE WELL HEALED.   Chest:      Chest wall: No tenderness.   Abdominal:      General: Bowel sounds are normal. There is no distension.      Palpations: Abdomen is soft.      Tenderness: There is no abdominal tenderness.   Musculoskeletal:         General: Normal range of motion.      Cervical back: Neck supple.      Right lower leg: No edema.      Left lower leg: No edema.   Skin:     General: Skin is warm and dry.      Findings: No erythema or rash.   Neurological:      General: No focal deficit present.      Mental Status: He is alert and oriented to person, place, and time.      Cranial Nerves: No cranial nerve deficit.   Psychiatric:         Mood and Affect: Mood normal.         Behavior: Behavior normal.            Significant Labs:   Recent Lab Results  (Last 5 results in the past 24 hours)        10/12/23  1146   10/12/23  0532   10/12/23  0531   10/12/23  0039   10/11/23  2026        Anion Gap   15             aPTT   46.9  Comment: Refer to local heparin nomogram for intensity/dose specific   therapeutic   range.       50.4  Comment: Refer to  local heparin nomogram for intensity/dose specific   therapeutic   range.           Baso #   0.03             Basophil %   0.9             BUN   42             Calcium   9.0             Chloride   99             CO2   25             Creatinine   2.6             Differential Method   Automated             eGFR   27             Eos #   0.1             Eosinophil %   2.4             Glucose   172             Gran # (ANC)   1.5             Gran %   45.1             Hematocrit   35.4             Hemoglobin   12.3             Immature Grans (Abs)   0.01  Comment: Mild elevation in immature granulocytes is non specific and   can be seen in a variety of conditions including stress response,   acute inflammation, trauma and pregnancy. Correlation with other   laboratory and clinical findings is essential.               Immature Granulocytes   0.3             Lymph #   1.2             Lymph %   35.4             Magnesium    2.4             MCH   30.7             MCHC   34.7             MCV   88             Mono #   0.5             Mono %   15.9             MPV   10.6             nRBC   0             Platelet Count   181             POCT Glucose 281     171     257       Potassium   3.7             RBC   4.01             RDW   13.4             Sodium   139             WBC   3.33                                    Significant Imaging: X-Ray: CXR: X-Ray Chest 1 View (CXR):   Results for orders placed or performed during the hospital encounter of 10/07/23   X-Ray Chest 1 View    Narrative    EXAMINATION:  XR CHEST 1 VIEW    CLINICAL HISTORY:  Encounter for adjustment and management of automatic implantable cardiac defibrillator    FINDINGS:  Single view of the chest.  Comparison 02/07/2018.  Left-sided generator and defibrillator wires demonstrates similar configuration.    Cardiac silhouette is normal.  The lungs demonstrate no evidence of active disease.  No evidence of pleural effusion or pneumothorax.  Bones appear intact.   Moderate degenerative changes and moderate atherosclerotic disease.  Sternotomy wires are intact.      Impression    No acute process seen.      Electronically signed by: Pj Velarde MD  Date:    10/07/2023  Time:    10:29

## 2023-10-12 NOTE — Clinical Note
Patient is now aware of what raymond Peralta MD has advised. Patient is curious since she is on Xarelto, can she take any otc medications for pain? She understands that she cannot take any NSAID'S.  To update raymond Peralta MD on her knee pain, the pain was very sudden started Sunday and has not stopped, patient is currently using a walker. She is scheduled for next Thursday. Patient requesting call back with more clinical advice. Please advise. Please send message to call to MA. May not be in the office to call patient back in a timely matter.    The ECG showed sinus rhythm.

## 2023-10-12 NOTE — PROGRESS NOTES
[facility] - Med Surg  Cardiology  Progress Note    Patient Name: Radha Abbott  MRN: [id number]  Admission Date: 10/7/2023  Hospital Length of Stay: 5 days  Code Status: Full Code   Attending Physician: Mery Calvo MD   Primary Care Physician: Vasu Kong MD  Expected Discharge Date: 10/12/2023  Principal Problem:Ventricular tachycardia    Subjective:     Hospital Course:   No more shocks overnight.  However his monitor showed 1 episode of nonsustained V-tach for 20 beats 1:00 a.m..    10/9/23-Patient seen and examined today, sitting up in bedside chair. Feels well. No CV complaints. Denies CP/SOB. Labs reviewed. K 3.3, being repleted. Mg 2.2. Creatinine 2.4. Will need ischemic workup. EP also consulted.    10/10/23-Patient seen and examined today, sitting up on bedside couch. Feels ok. . Creatinine with upward trend. LA WNL. RHC planned today. MPI stress test initially planned but patient had caffeine this AM.    10/11/23-Patient seen and examined today, s/p RHC which showed moderate to severe pulmonary HTN, elevated LVEDP, low cardiac output. Started on milrinone overnight, feeling less SOB. No CP symptoms. Runs of PAF overnight. Creatinine 2.4. IV Lasix initiated, will assess response.     10/122/2023 SEEN AND EXAMINED .APPEARS COMFORTABLE NOT DIURESING MUCH CLINICALLY IMPROVED ON INOTROPES HOWEVER HE IS MORE PRERENAL.DISCUSSED CASE WITH ADVANCED HEART FAILURE TEAM WILL MAKE PLANS FOR TRANSFER.      Interval History: HE APPEARS COMFORTABLE NO SHORTNESS OF BREATH    Review of Systems   Constitutional: Negative for malaise/fatigue.   Eyes:  Negative for blurred vision.   Cardiovascular:  Negative for chest pain, claudication, cyanosis, dyspnea on exertion, irregular heartbeat, leg swelling, near-syncope, orthopnea, palpitations and paroxysmal nocturnal dyspnea.   Respiratory:  Negative for cough, hemoptysis and shortness of breath.    Hematologic/Lymphatic: Negative for bleeding problem. Does  not bruise/bleed easily.   Skin:  Negative for dry skin and itching.   Musculoskeletal:  Negative for falls, muscle weakness and myalgias.   Gastrointestinal:  Negative for abdominal pain, diarrhea, heartburn, hematemesis, hematochezia and melena.   Genitourinary:  Negative for flank pain and hematuria.   Neurological:  Negative for dizziness, focal weakness, headaches, light-headedness, numbness, paresthesias, seizures and weakness.   Psychiatric/Behavioral:  Negative for altered mental status and memory loss. The patient is not nervous/anxious.    Allergic/Immunologic: Negative for hives.     Objective:     Vital Signs (Most Recent):  Temp: 98.1 °F (36.7 °C) (10/12/23 0718)  Pulse: 77 (10/12/23 0718)  Resp: 12 (10/12/23 0718)  BP: (!) 109/58 (10/12/23 0718)  SpO2: 97 % (10/12/23 0718) Vital Signs (24h Range):  Temp:  [97.8 °F (36.6 °C)-98.4 °F (36.9 °C)] 98.1 °F (36.7 °C)  Pulse:  [69-80] 77  Resp:  [12-19] 12  SpO2:  [95 %-100 %] 97 %  BP: ()/(57-66) 109/58     Weight: 81.7 kg (180 lb 1.9 oz)  Body mass index is 24.43 kg/m².     SpO2: 97 %         Intake/Output Summary (Last 24 hours) at 10/12/2023 1224  Last data filed at 10/12/2023 0901  Gross per 24 hour   Intake 378.59 ml   Output 825 ml   Net -446.41 ml       Lines/Drains/Airways       Peripheral Intravenous Line  Duration                  Peripheral IV - Single Lumen 10/07/23 1109 20 G Left Forearm 5 days         Peripheral IV - Single Lumen 10/11/23 1210 22 G Anterior;Left Hand 1 day                       Physical Exam  Vitals and nursing note reviewed.   Constitutional:       General: He is not in acute distress.     Appearance: He is well-developed. He is not diaphoretic.   HENT:      Head: Normocephalic and atraumatic.   Eyes:      General:         Right eye: No discharge.         Left eye: No discharge.      Pupils: Pupils are equal, round, and reactive to light.   Neck:      Thyroid: No thyromegaly.      Vascular: No JVD.   Cardiovascular:       Rate and Rhythm: Normal rate and regular rhythm.      Pulses: Normal pulses and intact distal pulses.      Heart sounds: Normal heart sounds. No murmur heard.     No friction rub. No gallop.   Pulmonary:      Effort: Pulmonary effort is normal. No respiratory distress.      Breath sounds: Normal breath sounds. No wheezing or rales.      Comments: SCAR CABG WELL HEALED.  AICD SITE WELL HEALED.   Chest:      Chest wall: No tenderness.   Abdominal:      General: Bowel sounds are normal. There is no distension.      Palpations: Abdomen is soft.      Tenderness: There is no abdominal tenderness.   Musculoskeletal:         General: Normal range of motion.      Cervical back: Neck supple.      Right lower leg: No edema.      Left lower leg: No edema.   Skin:     General: Skin is warm and dry.      Findings: No erythema or rash.   Neurological:      General: No focal deficit present.      Mental Status: He is alert and oriented to person, place, and time.      Cranial Nerves: No cranial nerve deficit.   Psychiatric:         Mood and Affect: Mood normal.         Behavior: Behavior normal.            Significant Labs:   Recent Lab Results  (Last 5 results in the past 24 hours)        10/12/23  1146   10/12/23  0532   10/12/23  0531   10/12/23  0039   10/11/23  2026        Anion Gap   15             aPTT   46.9  Comment: Refer to local heparin nomogram for intensity/dose specific   therapeutic   range.       50.4  Comment: Refer to local heparin nomogram for intensity/dose specific   therapeutic   range.           Baso #   0.03             Basophil %   0.9             BUN   42             Calcium   9.0             Chloride   99             CO2   25             Creatinine   2.6             Differential Method   Automated             eGFR   27             Eos #   0.1             Eosinophil %   2.4             Glucose   172             Gran # (ANC)   1.5             Gran %   45.1             Hematocrit   35.4              Hemoglobin   12.3             Immature Grans (Abs)   0.01  Comment: Mild elevation in immature granulocytes is non specific and   can be seen in a variety of conditions including stress response,   acute inflammation, trauma and pregnancy. Correlation with other   laboratory and clinical findings is essential.               Immature Granulocytes   0.3             Lymph #   1.2             Lymph %   35.4             Magnesium    2.4             MCH   30.7             MCHC   34.7             MCV   88             Mono #   0.5             Mono %   15.9             MPV   10.6             nRBC   0             Platelet Count   181             POCT Glucose 281     171     257       Potassium   3.7             RBC   4.01             RDW   13.4             Sodium   139             WBC   3.33                                    Significant Imaging: X-Ray: CXR: X-Ray Chest 1 View (CXR):   Results for orders placed or performed during the hospital encounter of 10/07/23   X-Ray Chest 1 View    Narrative    EXAMINATION:  XR CHEST 1 VIEW    CLINICAL HISTORY:  Encounter for adjustment and management of automatic implantable cardiac defibrillator    FINDINGS:  Single view of the chest.  Comparison 02/07/2018.  Left-sided generator and defibrillator wires demonstrates similar configuration.    Cardiac silhouette is normal.  The lungs demonstrate no evidence of active disease.  No evidence of pleural effusion or pneumothorax.  Bones appear intact.  Moderate degenerative changes and moderate atherosclerotic disease.  Sternotomy wires are intact.      Impression    No acute process seen.      Electronically signed by: Pj Velarde MD  Date:    10/07/2023  Time:    10:29     Assessment and Plan:     Brief HPI: A 64 YO MALE WITH VTAch s/p aicd shock on amiodarone controlled has low cardiac output and prerenal state not responding well to inotropes and diuresis . Will transfer to [facility] [place] ON [facility] FAILURE TEAM FOR MORE  HIGHER LEVEL OPF CARE DISCUSSED WITH CHF ATTENDING ON CALL AND HOSPITAL MEDICINE    * Ventricular tachycardia  Continue with beta-blocker from home.    Start IV amiodarone for 24 hours and then switch to 400 mg b.i.d for 1 week then 200 mg daily.  ICD interrogation reviewed.  Does not appear to have lead issues, sometimes ICD vibration happens if there is a lead fracture.    Chest x-ray does not show any lead fracture.  Battery life 3.5 years  Reviewed his ICD interrogation lead impedance is good    10.8.2023  Reviewed his 10 events.  He had 20 beats run of V-tach at 1:00 a.m..  Review of his ICD interrogation showed more than 2000 episodes of nonsustained V-tach in the last few months.  He is on carvedilol at home.  Currently blood pressure running low for resume and his carvedilol.  Once BP allows will start him on low-dose Toprol 25 mg.    We will hold his isosorbide for now and the Lasix  Will amiodarone IV to p.o. 400 mg b.i.d. for 1 week then 200 mg daily.    10/9/23  -Stable overnight, occasional PVC's noted on monitor this AM  -Continue amiodarone  -Continue Toprol XL  -Replete K > 4, keep Mg > 2  -EP consulted    10/10/23  -Stable overnight  -Continue amiodarone, Toprol XL  -MPI stress test planned but cancelled as patient had caffeine    10/11/23  -No VT overnight  -Continue amiodarone, Toprol XL  -Keep K and Mg WNL    10/12/2023  HAS NON SUSTAINED SHORT RUN OF VTACH AND SHORT PAF WILL CONTINUE AMIO KEEP K>4 MG>2    PAF (paroxysmal atrial fibrillation)  -PAF noted overnight on telemetry  -Continue Toprol XL  -Heparin gtt initiated  -Monitor    IVÁN (acute kidney injury)  -Monitor    10/10/23  -RHC today by Dr. Chou    10/11/23  -Cardiorenal, assess response to IV diuresis   10/12/2023  NOTR ESPONDING WELL TO INOTROPES AND DIURESIS WILL TRANSFER TO [facility] FOR ADVANCED CHF MODALITIES. DISCUSSED WITH HEART FAILURE TEAM.     Hypokalemia  -Replete K to > 4    HFrEF (heart failure with reduced ejection  fraction)  -Continue home meds.  Euvolemic    10/9/23  -Continue Toprol XL  -Entresto held due to bumped creatinine    10/10/23  -  -Continue Toprol XL  -Creatinine continues to trend upward; concerns for cardio-renal syndrome; low cardiac output   -LA WNL  -RHC to delineate volume status    10/11/23  -RHC yesterday showed mod-severe pulmonary HTN, low cardiac output, elevated LVEDP  -Started on milrinone  -Diurese and assess response  -Will try to rebuild CHF regimen pending creatinine trend     10/12/2023  WILL TRANSFER TO [facility] FOR ADVANCED HEART FAILURE MODALITY     CAD (coronary artery disease)  Chest pain-free.  Troponin elevation possibly due to his VFib shocked.  Continue home medications.  Check magnesium and electrolytes trend trop    10/9/23  -Repeat troponin pending  -Continue OMT-ASA, Plavix, BB, statin  -Will need ischemic workup    10/10/23  -See above        VTE Risk Mitigation (From admission, onward)         Ordered     heparin 25,000 units in dextrose 5% (100 units/ml) IV bolus from bag - ADDITIONAL PRN BOLUS - 60 units/kg  As needed (PRN)        Question:  Heparin Infusion Adjustment (DO NOT MODIFY ANSWER)  Answer:  [email or web address]\Images\Pharmacy\HeparinInfusions\heparin LOW INTENSITY nomogram for [facility] [id number]    10/11/23 1028     heparin 25,000 units in dextrose 5% (100 units/ml) IV bolus from bag - ADDITIONAL PRN BOLUS - 30 units/kg  As needed (PRN)        Question:  Heparin Infusion Adjustment (DO NOT MODIFY ANSWER)  Answer:  [email or web address]\Images\Pharmacy\HeparinInfusions\heparin LOW INTENSITY nomogram for [facility] [id number]    10/11/23 1028     heparin 25,000 units in dextrose 5% 250 mL (100 units/mL) infusion LOW INTENSITY nomogram - [facility]  Continuous        Question:  Begin at (units/kg/hr)  Answer:  12    10/11/23 1028     IP VTE LOW RISK PATIENT  Once         10/07/23 1352     Place sequential compression device  Until discontinued         10/07/23 1352                 Escobar Gandhi MD  Cardiology  [facility]

## 2023-10-12 NOTE — ASSESSMENT & PLAN NOTE
Patient's renal function is stable and likely at baseline    Renal ultrasound unremarkable  Urinalysis pending  Likely secondary to ongoing CHF

## 2023-10-12 NOTE — ASSESSMENT & PLAN NOTE
-Monitor    10/10/23  -RHC today by Dr. Chou    10/11/23  -Cardiorenal, assess response to IV diuresis   10/12/2023  NOTR ESPONDING WELL TO INOTROPES AND DIURESIS WILL TRANSFER TO [facility] [place] FOR ADVANCED CHF MODALITIES. DISCUSSED WITH HEART FAILURE TEAM.

## 2023-10-12 NOTE — PLAN OF CARE
Patient remains free of falls and injuries during shift. No signs of pain or discomfort noted.Telemonitoring in place. Heparin and milrinone infusing as ordered. Call light within reach. Chart check complete. Plan of care ongoing.      Problem: Adult Inpatient Plan of Care  Goal: Plan of Care Review  Outcome: Ongoing, Progressing  Goal: Patient-Specific Goal (Individualized)  Outcome: Ongoing, Progressing  Goal: Absence of Hospital-Acquired Illness or Injury  Outcome: Ongoing, Progressing  Goal: Optimal Comfort and Wellbeing  Outcome: Ongoing, Progressing  Goal: Readiness for Transition of Care  Outcome: Ongoing, Progressing     Problem: Diabetes Comorbidity  Goal: Blood Glucose Level Within Targeted Range  Outcome: Ongoing, Progressing     Problem: Fall Injury Risk  Goal: Absence of Fall and Fall-Related Injury  Outcome: Ongoing, Progressing

## 2023-10-12 NOTE — ASSESSMENT & PLAN NOTE
Encounter Date: 10/1/2023       History     Chief Complaint   Patient presents with    Fever     Grand parent presents with pt with c/o fever x2 days. States fever returns after giving NSAIDS.     Otalgia     Pt has been pulling at right ear.      Presented with parents c/o fever, irritability, and runny nose for 2 days. States had cold symptoms last week but it appeared that he was getting over it. States fussy, not eating as usual, still drinking and still active when fever is down. Reports has been alternating Tylenol and ibuprofen every 4 hours for 2 days. Last dose was Ibuprofen at 0800 today. States had COVID and RSV infection 6 mo ago.      Review of patient's allergies indicates:  No Known Allergies  Past Medical History:   Diagnosis Date    Asthma      Past Surgical History:   Procedure Laterality Date    CIRCUMCISION       No family history on file.  Social History     Tobacco Use    Smoking status: Never     Passive exposure: Never    Smokeless tobacco: Never   Substance Use Topics    Alcohol use: Never    Drug use: Never     Review of Systems   Constitutional:  Positive for activity change, appetite change, fever and irritability.   HENT:  Positive for congestion and rhinorrhea.    Eyes:  Negative for discharge.   Respiratory:  Positive for cough (occ).    Cardiovascular:  Negative for cyanosis.   Gastrointestinal:  Negative for diarrhea and vomiting.   Genitourinary:  Negative for decreased urine volume.   Skin:  Negative for rash.       Physical Exam     Initial Vitals [10/01/23 0846]   BP Pulse Resp Temp SpO2   (!) 70/54 (!) 141 28 (!) 103.3 °F (39.6 °C) 98 %      MAP       --         Physical Exam    Constitutional: He appears well-developed and well-nourished. He is active.   HENT:   Head: Atraumatic.   Left Ear: Tympanic membrane normal.   Nose: Nasal discharge present.   Mouth/Throat: Mucous membranes are moist. Oropharynx is clear.   Eyes: Conjunctivae and EOM are normal.   Cardiovascular:   Patient's FSGs are uncontrolled due to hyperglycemia on current medication regimen.  Last A1c reviewed-   Lab Results   Component Value Date    HGBA1C 7.7 (H) 11/02/2021     Most recent fingerstick glucose reviewed-   Recent Labs   Lab 10/10/23  1941 10/11/23  0509 10/11/23  1228 10/11/23  1635   POCTGLUCOSE 218* 169* 219* 222*     Current correctional scale  Medium  Maintain anti-hyperglycemic dose as follows-   Antihyperglycemics (From admission, onward)    Start     Stop Route Frequency Ordered    10/11/23 0900  empagliflozin (Jardiance) tablet 10 mg        Question Answer Comment   Does this patient have a diagnosis of heart failure? Yes    Does this patient have type 1 diabetes or diabetic ketoacidosis? No    Does this patient have symptomatic hypotension? No    Is the patient NPO or pending major surgery in next 3 days or less? No        -- Oral Daily 10/10/23 1740    10/07/23 1452  insulin aspart U-100 pen 0-10 Units         -- SubQ Every 6 hours PRN 10/07/23 1352        Hold Oral hypoglycemics while patient is in the hospital.   Normal rate, regular rhythm, S1 normal and S2 normal.        Pulses are strong.    Pulmonary/Chest: Effort normal and breath sounds normal. No nasal flaring or stridor. No respiratory distress. He has no wheezes. He has no rhonchi. He has no rales. He exhibits no retraction.   Abdominal: Abdomen is soft. Bowel sounds are normal. There is no abdominal tenderness.   Musculoskeletal:         General: Normal range of motion.     Neurological: He is alert.   Skin: Skin is warm and moist. Capillary refill takes less than 2 seconds. No rash noted.         Medical Screening Exam   See Full Note    ED Course   Procedures  Labs Reviewed   SARS-COV-2 RNA AMPLIFICATION, QUAL - Normal    Narrative:     Negative SARS-CoV results should not be used as the sole basis for treatment or patient management decisions; negative results should be considered in the context of a patient's recent exposures, history and the presene of clinical signs and symptoms consistent with COVID-19.  Negative results should be treated as presumptive and confirmed by molecular assay, if necessary for patient management.          Imaging Results    None          Medications   acetaminophen 32 mg/mL liquid (PEDS) 185.6 mg (185.6 mg Oral Given 10/1/23 0911)   cefTRIAXone injection 620 mg (620 mg Intramuscular Given 10/1/23 1012)   LIDOcaine HCL 10 mg/ml (1%) injection 2.1 mL (2.1 mLs Intramuscular Given 10/1/23 1012)     Medical Decision Making  Presented with parents c/o fever, irritability, and runny nose for 2 days. States had cold symptoms last week but it appeared that he was getting over it. States fussy, not eating as usual, still drinking and still active when fever is down. Reports has been alternating Tylenol and ibuprofen every 4 hours for 2 days. Last dose was Ibuprofen at 0800 today. States had COVID and RSV infection 6 mo ago.    Amount and/or Complexity of Data Reviewed  Independent Historian: parent  Labs: ordered.     Details: COVID 19  negative.    Risk  OTC drugs.  Prescription drug management.  Risk Details: Temp improved to 99. Rocephin 50mg/kg IM given in the ED. Pt. Stable.  Rx for Augmentin, Discharged home with detailed home care, medication info, and follow-up instructions provided.                               Clinical Impression:   Final diagnoses:  [H66.001] Non-recurrent acute suppurative otitis media of right ear without spontaneous rupture of tympanic membrane (Primary)        ED Disposition Condition    Discharge Stable          ED Prescriptions       Medication Sig Dispense Start Date End Date Auth. Provider    amoxicillin-clavulanate (AUGMENTIN) 400-57 mg/5 mL SusR Take 3.1 mLs (248 mg total) by mouth 2 (two) times daily. for 9 days 56 mL 10/1/2023 10/10/2023 Dara Zuniga NP          Follow-up Information       Follow up With Specialties Details Why Contact Info    Nighat Barba MD Pediatrics In 3 days If not starting to get better [address]  [facility] [address]  [phone]               Dara Zuniga NP  10/01/23 1020

## 2023-10-12 NOTE — PROGRESS NOTES
[facility] Hospital Medicine  Progress Note    Patient Name: Radha Abbott  MRN: [id number]  Patient Class: IP- Inpatient   Admission Date: 10/7/2023  Length of Stay: 4 days  Attending Physician: Mery Calvo MD  Primary Care Provider: Vasu Kong MD        Subjective:     Principal Problem:Ventricular tachycardia        HPI:  62 y/o male with PMHx of CABG, CAD, HTN, HLD and defibrillator developed near syncope 4 days ago and just as he was passing out his defibrillator shocked him and he fully recovered. He did not seek help. This am he noticed a buzzing sound and came to get it checked. He has family at bedside. He is asymptomatic. Patient admitted to ICU on 10/7/23. Cardiology consulted - noted V-fib episodes on ICD interrogation, started on IV amiodarone.  ----  Patient noted with 20 beat v-tach overnight  Amiodarone IV transitioned to PO  Marginal BP this morning, BP meds held  Cardiology following      Overview/Hospital Course:  Patient transitioned out of ICU.  He has been seen by Cardiology and EP has been consulted.  Had a 20 beat run of V-tach in early morning of 10/9.    Patient has a right heart catheterization with low pressures started on Milrinone drip overnight went into AFib with RVR currently is on heparin drip      Interval History:  Patient feeling better today with less shortness of breath    Review of Systems   Constitutional:  Positive for fatigue.   Respiratory:  Negative for cough and shortness of breath.    Cardiovascular:  Negative for chest pain, palpitations and leg swelling.   All other systems reviewed and are negative.    Objective:     Vital Signs (Most Recent):  Temp: 97.9 °F (36.6 °C) (10/11/23 1734)  Pulse: 73 (10/11/23 1734)  Resp: 18 (10/11/23 1734)  BP: (!) 99/57 (10/11/23 1734)  SpO2: 100 % (10/11/23 1734) Vital Signs (24h Range):  Temp:  [97.9 °F (36.6 °C)-98.7 °F (37.1 °C)] 97.9 °F (36.6 °C)  Pulse:  [50-80] 73  Resp:  [16-18] 18  SpO2:  [95 %-100 %]  100 %  BP: ()/(54-73) 99/57     Weight: 81.7 kg (180 lb 1.9 oz)  Body mass index is 24.43 kg/m².    Intake/Output Summary (Last 24 hours) at 10/11/2023 1910  Last data filed at 10/11/2023 0810  Gross per 24 hour   Intake 240 ml   Output 600 ml   Net -360 ml         Physical Exam  Vitals reviewed.   Constitutional:       Appearance: Normal appearance.   HENT:      Head: Normocephalic and atraumatic.      Mouth/Throat:      Mouth: Mucous membranes are moist.      Pharynx: Oropharynx is clear.   Eyes:      Extraocular Movements: Extraocular movements intact.      Conjunctiva/sclera: Conjunctivae normal.   Cardiovascular:      Rate and Rhythm: Normal rate and regular rhythm.      Pulses: Normal pulses.      Heart sounds: Murmur heard.   Pulmonary:      Effort: Pulmonary effort is normal.      Breath sounds: Normal breath sounds.   Abdominal:      General: Bowel sounds are normal.      Palpations: Abdomen is soft.   Musculoskeletal:         General: Normal range of motion.      Cervical back: Normal range of motion and neck supple.   Skin:     General: Skin is warm and dry.   Neurological:      General: No focal deficit present.      Mental Status: He is alert and oriented to person, place, and time. Mental status is at baseline.   Psychiatric:         Mood and Affect: Mood normal.         Behavior: Behavior normal.         Thought Content: Thought content normal.             Significant Labs: All pertinent labs within the past 24 hours have been reviewed.  CBC:   Recent Labs   Lab 10/10/23  0627 10/11/23  0620   WBC 3.37* 3.20*   HGB 13.3* 12.7*   HCT 39.3* 37.2*    190     CMP:   Recent Labs   Lab 10/10/23  0627 10/11/23  0620    137   K 3.6 3.7   CL 99 101   CO2 27 22*   * 188*   BUN 40* 41*   CREATININE 2.5* 2.4*   CALCIUM 9.2 9.1   ANIONGAP 13 14       Significant Imaging: I have reviewed all pertinent imaging results/findings within the past 24 hours.      Assessment/Plan:      *  Ventricular tachycardia  Admitted to ICU, amiodarone IV transitioned to PO  ICD interrogation > 2000 episodes of NSVT in the last few months  Cardiology following consulted EP for wait to see recs in a.m.    EP his evaluated and we will continue await final recommendations    PAF (paroxysmal atrial fibrillation)  Patient with Paroxysmal (<7 days) atrial fibrillation which is controlled currently with Beta Blocker. Patient is currently in atrial fibrillation.[id number] Score: 2. HASBLED Score: . Anticoagulation indicated. Anticoagulation done with heparin.    IVÁN (acute kidney injury)  Patient's renal function is stable and likely at baseline    Renal ultrasound unremarkable  Urinalysis pending  Likely secondary to ongoing CHF    Hypokalemia  PRN repletion orders in place  Monitor labs      DM (diabetes mellitus)  Patient's FSGs are uncontrolled due to hyperglycemia on current medication regimen.  Last A1c reviewed-   Lab Results   Component Value Date    HGBA1C 7.7 (H) 11/02/2021     Most recent fingerstick glucose reviewed-   Recent Labs   Lab 10/10/23  1941 10/11/23  0509 10/11/23  1228 10/11/23  1635   POCTGLUCOSE 218* 169* 219* 222*     Current correctional scale  Medium  Maintain anti-hyperglycemic dose as follows-   Antihyperglycemics (From admission, onward)    Start     Stop Route Frequency Ordered    10/11/23 0900  empagliflozin (Jardiance) tablet 10 mg        Question Answer Comment   Does this patient have a diagnosis of heart failure? Yes    Does this patient have type 1 diabetes or diabetic ketoacidosis? No    Does this patient have symptomatic hypotension? No    Is the patient NPO or pending major surgery in next 3 days or less? No        -- Oral Daily 10/10/23 1740    10/07/23 1452  insulin aspart U-100 pen 0-10 Units         -- SubQ Every 6 hours PRN 10/07/23 1352        Hold Oral hypoglycemics while patient is in the hospital.    HFrEF (heart failure with reduced ejection fraction)  Compensated, BP  marginal  Holding diuretics were with increasing creatinine may need to reinstitute  Cardiology following    Echo    Result Date: 10/7/2023    Left Ventricle: The left ventricle is severely dilated. Wall is thinner   than normal. Severe global hypokinesis present. There is severely reduced   systolic function with a visually estimated ejection fraction of 15 - 20%.   Grade III diastolic dysfunction.    Left Atrium: Left atrium is severely dilated.    Right Ventricle: Normal right ventricular cavity size. Wall thickness   is normal. Right ventricle wall motion  is normal. Systolic function is   normal. Pacemaker lead present in the ventricle.    Aortic Valve: There is moderate aortic regurgitation.    Mitral Valve: There is mild to moderate regurgitation.    Tricuspid Valve: There is mild regurgitation.    IVC/SVC: Intermediate venous pressure at 8 mmHg.      We will await cardiac recommendations regarding diuretics, heart catheterization, dobutamine/Milrinone infusion    CAD (coronary artery disease)    Pain-free, troponin likely elevated secondary to VFib with defibrillation  We will need ischemic workup sometime in the future however with elevated creatinine we will have to discuss with Cardiology timing  Continue current medication of aspirin Plavix beta-blocker and statin      VTE Risk Mitigation (From admission, onward)         Ordered     heparin 25,000 units in dextrose 5% (100 units/ml) IV bolus from bag - ADDITIONAL PRN BOLUS - 60 units/kg  As needed (PRN)        Question:  Heparin Infusion Adjustment (DO NOT MODIFY ANSWER)  Answer:  [email or web address]\epic\Images\Pharmacy\HeparinInfusions\heparin LOW INTENSITY nomogram for [facility] [id number]    10/11/23 1028     heparin 25,000 units in dextrose 5% (100 units/ml) IV bolus from bag - ADDITIONAL PRN BOLUS - 30 units/kg  As needed (PRN)        Question:  Heparin Infusion Adjustment (DO NOT MODIFY ANSWER)  Answer:   \\[email or web address]\epic\Images\Pharmacy\HeparinInfusions\heparin LOW INTENSITY nomogram for [facility] [id number].pdf    10/11/23 1028     heparin 25,000 units in dextrose 5% 250 mL (100 units/mL) infusion LOW INTENSITY nomogram - [facility]  Continuous        Question:  Begin at (units/kg/hr)  Answer:  12    10/11/23 1028     IP VTE LOW RISK PATIENT  Once         10/07/23 1352     Place sequential compression device  Until discontinued         10/07/23 1352                Discharge Planning   STANLEY: 10/10/2023     Code Status: Full Code   Is the patient medically ready for discharge?:     Reason for patient still in hospital (select all that apply): Patient trending condition, Treatment and Consult recommendations  Discharge Plan A: Home with family                  Mery Randall MD  Department of Hospital Medicine   [facility] - Med Surg

## 2023-10-12 NOTE — ASSESSMENT & PLAN NOTE
Continue with beta-blocker from home.    Start IV amiodarone for 24 hours and then switch to 400 mg b.i.d for 1 week then 200 mg daily.  ICD interrogation reviewed.  Does not appear to have lead issues, sometimes ICD vibration happens if there is a lead fracture.    Chest x-ray does not show any lead fracture.  Battery life 3.5 years  Reviewed his ICD interrogation lead impedance is good    10.8.2023  Reviewed his 10 events.  He had 20 beats run of V-tach at 1:00 a.m..  Review of his ICD interrogation showed more than 2000 episodes of nonsustained V-tach in the last few months.  He is on carvedilol at home.  Currently blood pressure running low for resume and his carvedilol.  Once BP allows will start him on low-dose Toprol 25 mg.    We will hold his isosorbide for now and the Lasix  Will amiodarone IV to p.o. 400 mg b.i.d. for 1 week then 200 mg daily.    10/9/23  -Stable overnight, occasional PVC's noted on monitor this AM  -Continue amiodarone  -Continue Toprol XL  -Replete K > 4, keep Mg > 2  -EP consulted    10/10/23  -Stable overnight  -Continue amiodarone, Toprol XL  -MPI stress test planned but cancelled as patient had caffeine    10/11/23  -No VT overnight  -Continue amiodarone, Toprol XL  -Keep K and Mg WNL    10/12/2023  HAS NON SUSTAINED SHORT RUN OF VTACH AND SHORT PAF WILL CONTINUE AMIO KEEP K>4 MG>2

## 2023-10-12 NOTE — Clinical Note
The PA catheter was reinserted into the main pulmonary artery. Hemodynamics were performed. O2 saturation was measured at 62%. C.O=4.0  C.I=2.0

## 2023-10-13 ENCOUNTER — TELEPHONE (OUTPATIENT)
Dept: TRANSPLANT | Facility: CLINIC | Age: 63
End: 2023-10-13

## 2023-10-13 ENCOUNTER — DOCUMENTATION ONLY (OUTPATIENT)
Dept: CARDIOLOGY | Facility: HOSPITAL | Age: 63
End: 2023-10-13
Payer: MEDICAID

## 2023-10-13 LAB
ANION GAP SERPL CALC-SCNC: 12 MMOL/L (ref 8–16)
APTT PPP: 38.4 SEC (ref 21–32)
APTT PPP: 38.9 SEC (ref 21–32)
BASOPHILS # BLD AUTO: 0.03 K/UL (ref 0–0.2)
BASOPHILS # BLD AUTO: 0.04 K/UL (ref 0–0.2)
BASOPHILS NFR BLD: 0.9 % (ref 0–1.9)
BASOPHILS NFR BLD: 1.1 % (ref 0–1.9)
BUN SERPL-MCNC: 41 MG/DL (ref 8–23)
CALCIUM SERPL-MCNC: 9.4 MG/DL (ref 8.7–10.5)
CHLORIDE SERPL-SCNC: 103 MMOL/L (ref 95–110)
CO2 SERPL-SCNC: 23 MMOL/L (ref 23–29)
CREAT SERPL-MCNC: 2.4 MG/DL (ref 0.5–1.4)
DIFFERENTIAL METHOD: ABNORMAL
DIFFERENTIAL METHOD: ABNORMAL
EOSINOPHIL # BLD AUTO: 0 K/UL (ref 0–0.5)
EOSINOPHIL # BLD AUTO: 0 K/UL (ref 0–0.5)
EOSINOPHIL NFR BLD: 0.8 % (ref 0–8)
EOSINOPHIL NFR BLD: 1.2 % (ref 0–8)
ERYTHROCYTE [DISTWIDTH] IN BLOOD BY AUTOMATED COUNT: 13.5 % (ref 11.5–14.5)
ERYTHROCYTE [DISTWIDTH] IN BLOOD BY AUTOMATED COUNT: 13.5 % (ref 11.5–14.5)
EST. GFR  (NO RACE VARIABLE): 29.9 ML/MIN/1.73 M^2
GLUCOSE SERPL-MCNC: 128 MG/DL (ref 70–110)
HCT VFR BLD AUTO: 36.6 % (ref 40–54)
HCT VFR BLD AUTO: 37.8 % (ref 40–54)
HGB BLD-MCNC: 12.5 G/DL (ref 14–18)
HGB BLD-MCNC: 12.8 G/DL (ref 14–18)
IMM GRANULOCYTES # BLD AUTO: 0.01 K/UL (ref 0–0.04)
IMM GRANULOCYTES # BLD AUTO: 0.01 K/UL (ref 0–0.04)
IMM GRANULOCYTES NFR BLD AUTO: 0.3 % (ref 0–0.5)
IMM GRANULOCYTES NFR BLD AUTO: 0.3 % (ref 0–0.5)
INR PPP: 1 (ref 0.8–1.2)
INR PPP: 1 (ref 0.8–1.2)
LYMPHOCYTES # BLD AUTO: 1.4 K/UL (ref 1–4.8)
LYMPHOCYTES # BLD AUTO: 1.4 K/UL (ref 1–4.8)
LYMPHOCYTES NFR BLD: 37.8 % (ref 18–48)
LYMPHOCYTES NFR BLD: 41.3 % (ref 18–48)
MAGNESIUM SERPL-MCNC: 2.5 MG/DL (ref 1.6–2.6)
MCH RBC QN AUTO: 30 PG (ref 27–31)
MCH RBC QN AUTO: 30.5 PG (ref 27–31)
MCHC RBC AUTO-ENTMCNC: 33.9 G/DL (ref 32–36)
MCHC RBC AUTO-ENTMCNC: 34.2 G/DL (ref 32–36)
MCV RBC AUTO: 88 FL (ref 82–98)
MCV RBC AUTO: 90 FL (ref 82–98)
MONOCYTES # BLD AUTO: 0.5 K/UL (ref 0.3–1)
MONOCYTES # BLD AUTO: 0.5 K/UL (ref 0.3–1)
MONOCYTES NFR BLD: 14.3 % (ref 4–15)
MONOCYTES NFR BLD: 14.5 % (ref 4–15)
NEUTROPHILS # BLD AUTO: 1.4 K/UL (ref 1.8–7.7)
NEUTROPHILS # BLD AUTO: 1.7 K/UL (ref 1.8–7.7)
NEUTROPHILS NFR BLD: 42 % (ref 38–73)
NEUTROPHILS NFR BLD: 45.5 % (ref 38–73)
NRBC BLD-RTO: 0 /100 WBC
NRBC BLD-RTO: 0 /100 WBC
PHOSPHATE SERPL-MCNC: 3.7 MG/DL (ref 2.7–4.5)
PLATELET # BLD AUTO: 191 K/UL (ref 150–450)
PLATELET # BLD AUTO: 195 K/UL (ref 150–450)
PMV BLD AUTO: 10.8 FL (ref 9.2–12.9)
PMV BLD AUTO: 11.4 FL (ref 9.2–12.9)
POCT GLUCOSE: 131 MG/DL (ref 70–110)
POCT GLUCOSE: 157 MG/DL (ref 70–110)
POCT GLUCOSE: 209 MG/DL (ref 70–110)
POCT GLUCOSE: 254 MG/DL (ref 70–110)
POTASSIUM SERPL-SCNC: 3.5 MMOL/L (ref 3.5–5.1)
PROTHROMBIN TIME: 10.8 SEC (ref 9–12.5)
PROTHROMBIN TIME: 10.9 SEC (ref 9–12.5)
RBC # BLD AUTO: 4.16 M/UL (ref 4.6–6.2)
RBC # BLD AUTO: 4.2 M/UL (ref 4.6–6.2)
SODIUM SERPL-SCNC: 138 MMOL/L (ref 136–145)
WBC # BLD AUTO: 3.29 K/UL (ref 3.9–12.7)
WBC # BLD AUTO: 3.65 K/UL (ref 3.9–12.7)

## 2023-10-13 PROCEDURE — 36415 COLL VENOUS BLD VENIPUNCTURE: CPT | Performed by: STUDENT IN AN ORGANIZED HEALTH CARE EDUCATION/TRAINING PROGRAM

## 2023-10-13 PROCEDURE — 85610 PROTHROMBIN TIME: CPT | Performed by: STUDENT IN AN ORGANIZED HEALTH CARE EDUCATION/TRAINING PROGRAM

## 2023-10-13 PROCEDURE — A4216 STERILE WATER/SALINE, 10 ML: HCPCS | Performed by: INTERNAL MEDICINE

## 2023-10-13 PROCEDURE — 99233 SBSQ HOSP IP/OBS HIGH 50: CPT | Mod: ,,, | Performed by: PHYSICIAN ASSISTANT

## 2023-10-13 PROCEDURE — 76937 US GUIDE VASCULAR ACCESS: CPT

## 2023-10-13 PROCEDURE — 93451 RIGHT HEART CATH: CPT | Mod: 26,,, | Performed by: INTERNAL MEDICINE

## 2023-10-13 PROCEDURE — 99233 PR SUBSEQUENT HOSPITAL CARE,LEVL III: ICD-10-PCS | Mod: ,,, | Performed by: PHYSICIAN ASSISTANT

## 2023-10-13 PROCEDURE — 93451 RIGHT HEART CATH: CPT | Performed by: INTERNAL MEDICINE

## 2023-10-13 PROCEDURE — 80048 BASIC METABOLIC PNL TOTAL CA: CPT | Performed by: STUDENT IN AN ORGANIZED HEALTH CARE EDUCATION/TRAINING PROGRAM

## 2023-10-13 PROCEDURE — 85025 COMPLETE CBC W/AUTO DIFF WBC: CPT | Mod: 91 | Performed by: STUDENT IN AN ORGANIZED HEALTH CARE EDUCATION/TRAINING PROGRAM

## 2023-10-13 PROCEDURE — C1894 INTRO/SHEATH, NON-LASER: HCPCS | Performed by: INTERNAL MEDICINE

## 2023-10-13 PROCEDURE — 85025 COMPLETE CBC W/AUTO DIFF WBC: CPT | Performed by: STUDENT IN AN ORGANIZED HEALTH CARE EDUCATION/TRAINING PROGRAM

## 2023-10-13 PROCEDURE — C1751 CATH, INF, PER/CENT/MIDLINE: HCPCS

## 2023-10-13 PROCEDURE — 63600175 PHARM REV CODE 636 W HCPCS: Performed by: STUDENT IN AN ORGANIZED HEALTH CARE EDUCATION/TRAINING PROGRAM

## 2023-10-13 PROCEDURE — 85730 THROMBOPLASTIN TIME PARTIAL: CPT | Mod: 91 | Performed by: STUDENT IN AN ORGANIZED HEALTH CARE EDUCATION/TRAINING PROGRAM

## 2023-10-13 PROCEDURE — 84100 ASSAY OF PHOSPHORUS: CPT | Performed by: STUDENT IN AN ORGANIZED HEALTH CARE EDUCATION/TRAINING PROGRAM

## 2023-10-13 PROCEDURE — 25000003 PHARM REV CODE 250: Performed by: INTERNAL MEDICINE

## 2023-10-13 PROCEDURE — C1751 CATH, INF, PER/CENT/MIDLINE: HCPCS | Performed by: INTERNAL MEDICINE

## 2023-10-13 PROCEDURE — 36573 INSJ PICC RS&I 5 YR+: CPT

## 2023-10-13 PROCEDURE — 85610 PROTHROMBIN TIME: CPT | Mod: 91 | Performed by: STUDENT IN AN ORGANIZED HEALTH CARE EDUCATION/TRAINING PROGRAM

## 2023-10-13 PROCEDURE — 93451 PR RIGHT HEART CATH O2 SATURATION & CARDIAC OUTPUT: ICD-10-PCS | Mod: 26,,, | Performed by: INTERNAL MEDICINE

## 2023-10-13 PROCEDURE — 25000003 PHARM REV CODE 250: Performed by: PHYSICIAN ASSISTANT

## 2023-10-13 PROCEDURE — 25000003 PHARM REV CODE 250: Performed by: STUDENT IN AN ORGANIZED HEALTH CARE EDUCATION/TRAINING PROGRAM

## 2023-10-13 PROCEDURE — 83735 ASSAY OF MAGNESIUM: CPT | Performed by: STUDENT IN AN ORGANIZED HEALTH CARE EDUCATION/TRAINING PROGRAM

## 2023-10-13 PROCEDURE — 85730 THROMBOPLASTIN TIME PARTIAL: CPT | Performed by: STUDENT IN AN ORGANIZED HEALTH CARE EDUCATION/TRAINING PROGRAM

## 2023-10-13 PROCEDURE — 20600001 HC STEP DOWN PRIVATE ROOM

## 2023-10-13 RX ORDER — HEPARIN SODIUM,PORCINE/D5W 25000/250
0-40 INTRAVENOUS SOLUTION INTRAVENOUS CONTINUOUS
Status: DISCONTINUED | OUTPATIENT
Start: 2023-10-13 | End: 2023-10-13

## 2023-10-13 RX ORDER — SODIUM CHLORIDE 0.9 % (FLUSH) 0.9 %
10 SYRINGE (ML) INJECTION
Status: DISCONTINUED | OUTPATIENT
Start: 2023-10-13 | End: 2023-10-16 | Stop reason: HOSPADM

## 2023-10-13 RX ORDER — SODIUM CHLORIDE 0.9 % (FLUSH) 0.9 %
10 SYRINGE (ML) INJECTION EVERY 6 HOURS
Status: DISCONTINUED | OUTPATIENT
Start: 2023-10-13 | End: 2023-10-16 | Stop reason: HOSPADM

## 2023-10-13 RX ORDER — AMIODARONE HYDROCHLORIDE 200 MG/1
400 TABLET ORAL 2 TIMES DAILY
Status: DISCONTINUED | OUTPATIENT
Start: 2023-10-13 | End: 2023-10-16 | Stop reason: HOSPADM

## 2023-10-13 RX ORDER — LIDOCAINE HYDROCHLORIDE 20 MG/ML
INJECTION, SOLUTION INFILTRATION; PERINEURAL
Status: DISCONTINUED | OUTPATIENT
Start: 2023-10-13 | End: 2023-10-16 | Stop reason: HOSPADM

## 2023-10-13 RX ADMIN — AMIODARONE HYDROCHLORIDE 400 MG: 200 TABLET ORAL at 08:10

## 2023-10-13 RX ADMIN — ASPIRIN 81 MG CHEWABLE TABLET 81 MG: 81 TABLET CHEWABLE at 08:10

## 2023-10-13 RX ADMIN — Medication 10 ML: at 05:10

## 2023-10-13 RX ADMIN — CLOPIDOGREL BISULFATE 75 MG: 75 TABLET ORAL at 08:10

## 2023-10-13 RX ADMIN — HEPARIN SODIUM AND DEXTROSE 12.73 UNITS/KG/HR: 10000; 5 INJECTION INTRAVENOUS at 05:10

## 2023-10-13 RX ADMIN — INSULIN ASPART 2 UNITS: 100 INJECTION, SOLUTION INTRAVENOUS; SUBCUTANEOUS at 04:10

## 2023-10-13 RX ADMIN — ATORVASTATIN CALCIUM 80 MG: 40 TABLET, FILM COATED ORAL at 08:10

## 2023-10-13 RX ADMIN — INSULIN ASPART 1 UNITS: 100 INJECTION, SOLUTION INTRAVENOUS; SUBCUTANEOUS at 09:10

## 2023-10-13 NOTE — CONSULTS
Double lumen PICC to right basilic vein.  37 cm in length, 29 cm arm circumference and 0 cm exposed.   Lot # [id number].

## 2023-10-13 NOTE — ASSESSMENT & PLAN NOTE
Normal kidney function in march 2022. Unclear if IVÁN due to cardiorenal or new baseline, but improving with addition of inotrope. Hold diuresis and nephrotoxic medications.

## 2023-10-13 NOTE — PROGRESS NOTES
Update:    CRISTAL received orders for home  for dc.  CRISTAL contacted Bessie with [employer]. [phone] Provided referral.  Pending auth.  PICC ordered.    CRISTAL contacted [facility] ([facility]) and provided referral. [phone] Pending acceptance.     Met with pt's wife to discuss plans.  Wife stated that she understood that pt will be here through the weekend.      SW will follow.  Team updated.

## 2023-10-13 NOTE — PROGRESS NOTES
Admit Note     Heart Transplant  spoke with patient and spouse to assess needs. Role of  was explained and the pt/spouse agreed to complete assessment by phone.   Patient is a 63 y.o.  male, admitted for heart failure with reduced ejection fraction, ventricular fibrillation , paroxysmal atrial fibrillation, IVÁN, DM, and coronary artery disease.      Patient admitted to [facility] on 10/12/2023 .  At this time, patient presents by phone as alert and oriented x 4, calm, and communicative.  At this time, patients caregiver presents as alert and oriented x 4, calm, communicative, and asking and answering questions appropriately.    Household/Family Systems     Patient resides with patient's spouse, at     [address] [address].      Support system includes spouse, sister and adult children.  Patient does not have dependents that are need of being cared for.  The pt has four adult children (3 daughters and one son) of his own. Pt and spouse do not have any children together.   Spouse reports she plans to stay with pt until discharge.     Patients primary caregiver is pt with support from spuse when indicated.     Pt's cell:  [phone]    Emergency contacts:   Arlyn Abbott (spouse, works full time and drives) [phone]  Teetee Zhou (sister) [phone]    During admission, patient's caregiver plans to stay in patient's room.  Confirmed patient and patients caregivers do have access to reliable transportation.    Cognitive Status/Learning     Patient reports reading ability as 12th grade and states patient does not have difficulty with reading, writing, and memory.   Pt reports difficulty with eyesight due to diabetes, selective hearing per spouse and additional time needed to learn new information.    Patient reports patient learns best by one on one support.   Needed: No.   Highest education level: High School (9-12) or GED    Vocation/Disability    .  Working for Income: No  If no, reason not working: Disability  Patient reports his disability started in  and he receives $500 a month.   Pt reports he does not know why he has not qualified for Medicare.    Pt's spouse works full time at a hospital in [place].  Spouse is also an CNA. Spouse reports she is eligible for FMLA.   Pt/spouse did not report any income hardships at this time.     Adherence     Patient reports a high level of adherence to patients health care regimen.  Adherence counseling and education provided. Patient verbalizes understanding.    Substance Use    Patient reports the following substance usage.    Tobacco: pt reports he smokes cigarettes .5 ppd. Pt reports he smoked his last cigarette last Saturday.   Pt reports he does not use any other tobacco products.   Alcohol: only at parties and holidays.   Illicit Drugs/Non-prescribed Medications: none, patient denies any use.  Patient states clear understanding of the potential impact of substance use.  Substance abstinence/cessation counseling, education and resources provided and reviewed.     Services Utilizing/ADLS    Infusion Service: Prior to admission, patient utilizing? no  Home Health: Prior to admission, patient utilizing? no  DME: Prior to admission, no. Pt/spouse would like to know if the pt can get a Dexcom Glucose monitoring system.  Pulmonary/Cardiac Rehab: Prior to admission, no  Dialysis:  Prior to admission, no  Transplant Specialty Pharmacy:  Prior to admission, no.    Prior to admission, patient reports patient was  mostly independent with ADLS and was driving.  Patient reports patient is not able to care for self at this time due to compromised medical condition (as documented in medical record) and physical weakness..  Patient indicates a willingness to care for self once medically cleared to do so.    Insurance/Medications    Insured by   Payer/Plan Subscr  Sex Relation Sub. Ins. ID Effective Group Num    1. [employer] - SMOALISON BUCK 1960 Male Self [id number] 10/9/23                                    PO    2. MEDICAID - UHALISON BUCK 1960 Male Self [id number][facility]                                   [address] [address]      Primary Insurance (for UNOS reporting): Public Insurance - Medicaid  Secondary Insurance (for UNOS reporting): None    Patient reports patient is able to obtain and afford medications at this time and at time of discharge.    Living Will/Healthcare Power of     Patient states patient does not have a LW and/or HCPA.   provided education regarding LW and HCPA and the completion of forms. Pt's spouse would like the forms for pt to complete while in the hospital.     Coping/Mental Health    Patient is coping adequately with the aid of  family members. .   Patient denies mental health difficulties.   Worker provided general support and encouragement given hospital admission.       Discharge Planning    At time of discharge, patient plans to return to patient's home under the care of self and spouse.  Patients spouse will transport patient.  Per rounds today, expected discharge date has not been medically determined at this time. Patient and patients caregiver  verbalize understanding and are involved in treatment planning and discharge process.    Additional Concerns    Patient is being followed for needs, education, resources, information, emotional support, supportive counseling, and for supportive and skilled discharge plan of care.  providing ongoing psychosocial support, education, resources and d/c planning as needed.  SW remains available. Patient's caregiver verbalizes understanding and agreement with information reviewed,  availability and how to access available resources as needed. Patient verbalizes understanding and agreement with information reviewed, social work availability, and how to access available  resources as needed.

## 2023-10-13 NOTE — ASSESSMENT & PLAN NOTE
Ischemic Cardiomyopathy  Euvolemic on exam  Home GDMT w/ Entresto, Isordil, Aldactone, Jardiance, Coreg (currently on hold).  RHC findings this AM on  5 as follows: RA: 2 RV: 43/2 PA: 35/16, mean 23 PCWP: 12    Continue to hold diuresis   Will get PICC line for home inotropes. Will plan for D/C once HH therapies are set up.   Unfortunately out of network for advanced options and will need to be evaluated and treated surgically at other facility if advanced therapies are to be pursued.

## 2023-10-13 NOTE — HPI
Pt is a 62yo M with pmhx of CAD s/p 3v CABG (unclear anatomy, 2009), ICM with a recent EF of 15-20% s/p ICD (elsa 2009), DM2 (a1c 7.7), HTN, HLD who presents as a transfer for VFib and dHF    Pt was admitted to OSH on 10/7.  At that time he was complaining of orthopnea, SOB, Nelson.  He was walking around when he felt dizzy and thought he was going to pass out.  His ICD discharged and shocked him.  He went to the OSH.  At the OSH he was found to be in dHF with an IVÁN.  He was initially started on BB.  His ICD was interrogated and EP was consulted which confirmed a true VF event with ICD discharge.  Dr. Barajas saw the pt and he was started on an amio gtt.  He continued to have bouts of NSVT which self terminated.  No further device discharges.  An echo was obtained (last in 2021 with similar EF) showing an EF of 15-20% and a CVP of 8.  He was started on Lasix 80mg IV BID.  His kidney function never improved and started to worsen.  He underwent RHC which showed moderately elevated L sided filling pressures and mod-severe pHTN (see below).  He was started on primacor (Cr at this time was 2.4).  He had numerous sustained runs of AFib with spontaneous conversion.  He was started on a heparin gtt and subsequently transferred to [facility] for higher level of care    I evaluated the pt at the bedside.  Pt has no complaints.  Says he is feeling much better and is walking up an down the halls without any SOB.  He denies orthopnea and can lay flat for the entire night if needed.  Bedside echo shows globally reduced LV function with an EF of 15-20%.  Mod MR and unable to obtain views for AV doppler.  CVP was difficult to evaluate but seemed to be 3.  He was subsequently admitted to the [facility] service    [facility] 10/10/2023:  RA: 13/ 10/ 9 RV: 73/ 9/ 18 PA: 75/ 36/ 53 PWP: 33/ 49/ 35 .   Cardiac output was 2.33 by thermodilution. Cardiac index is 1.15 L/min/m2.

## 2023-10-13 NOTE — OP NOTE
Patient was brought to cath lab, draped, and prepped in the usual sterile fashion. Right IJ was accessed via modified seldinger technique with ultrasound guidance and guidewire was introduced into the IJ. Sequential dilation with micropuncture kit and sheath was performed. [place] Siva catheter was introduced via the sheath. Measurements were obtained, and sheath was removed. Patient tolerated the procedure well and discharged in stable condition.

## 2023-10-13 NOTE — H&P
[facility] - Cardiology Stepdown  Cardiology  History and Physical     Patient Name: Radha Abbott  MRN: [id number]  Admission Date: 10/12/2023  Code Status: Full Code   Attending Provider: Josie Downing DO   Primary Care Physician: Vasu Kong MD  Principal Problem:HFrEF (heart failure with reduced ejection fraction)    Patient information was obtained from patient and ER records.     Subjective:     Chief Complaint:  VFib and dHF     HPI:  Pt is a 62yo M with pmhx of CAD s/p 3v CABG (unclear anatomy, 2009), ICM with a recent EF of 15-20% s/p ICD ([employer] 2009), DM2 (a1c 7.7), HTN, HLD who presents as a transfer for VFib and dHF    Pt was admitted to OSH on 10/7.  At that time he was complaining of orthopnea, SOB, Nelson.  He was walking around when he felt dizzy and thought he was going to pass out.  His ICD discharged and shocked him.  He went to the OSH.  At the OSH he was found to be in dHF with an IVÁN.  He was initially started on BB.  His ICD was interrogated and EP was consulted which confirmed a true VF event with ICD discharge.  Dr. Barajas saw the pt and he was started on an amio gtt.  He continued to have bouts of NSVT which self terminated.  No further device discharges.  An echo was obtained (last in 2021 with similar EF) showing an EF of 15-20% and a CVP of 8.  He was started on Lasix 80mg IV BID.  His kidney function never improved and started to worsen.  He underwent RHC which showed moderately elevated L sided filling pressures and mod-severe pHTN (see below).  He was started on primacor (Cr at this time was 2.4).  He had numerous sustained runs of AFib with spontaneous conversion.  He was started on a heparin gtt and subsequently transferred to [facility] for higher level of care    I evaluated the pt at the bedside.  Pt has no complaints.  Says he is feeling much better and is walking up an down the halls without any SOB.  He denies orthopnea and can lay flat for the entire night if needed.   Bedside echo shows globally reduced LV function with an EF of 15-20%.  Mod MR and unable to obtain views for AV doppler.  CVP was difficult to evaluate but seemed to be 3.  He was subsequently admitted to the [facility] service    [facility] 10/10/2023:  RA: 13/ 10/ 9 RV: 73/ 9/ 18 PA: 75/ 36/ 53 PWP: 33/ 49/ 35 .   Cardiac output was 2.33 by thermodilution. Cardiac index is 1.15 L/min/m2.      Past Medical History:   Diagnosis Date    CAD (coronary artery disease)     Diabetes mellitus     HFrEF (heart failure with reduced ejection fraction)     ICD (implantable cardioverter-defibrillator) in place     MI, old        Past Surgical History:   Procedure Laterality Date    CARDIAC SURGERY      RIGHT HEART CATHETERIZATION Right 10/10/2023    Procedure: INSERTION, CATHETER, RIGHT HEART;  Surgeon: Bin Gandhi MD;  Location: [facility] CATH LAB;  Service: Cardiology;  Laterality: Right;       Review of patient's allergies indicates:  No Known Allergies    Current Facility-Administered Medications on File Prior to Encounter   Medication    [DISCONTINUED] acetaminophen tablet 650 mg    [DISCONTINUED] aspirin EC tablet 81 mg    [DISCONTINUED] atorvastatin tablet 40 mg    [DISCONTINUED] calcium gluconate 1 g in NS IVPB (premixed)    [DISCONTINUED] calcium gluconate 1 g in NS IVPB (premixed)    [DISCONTINUED] calcium gluconate 1 g in NS IVPB (premixed)    [DISCONTINUED] clopidogreL tablet 75 mg    [DISCONTINUED] dextrose 10% bolus 125 mL 125 mL    [DISCONTINUED] dextrose 10% bolus 250 mL 250 mL    [DISCONTINUED] empagliflozin (Jardiance) tablet 10 mg    [DISCONTINUED] famotidine tablet 20 mg    [DISCONTINUED] furosemide injection 40 mg    [DISCONTINUED] glucagon (human recombinant) injection 1 mg    [DISCONTINUED] heparin 25,000 units in dextrose 5% (100 units/ml) IV bolus from bag - ADDITIONAL PRN BOLUS - 30 units/kg    [DISCONTINUED] heparin 25,000 units in dextrose 5% (100 units/ml) IV bolus from bag - ADDITIONAL  PRN BOLUS - 60 units/kg    [DISCONTINUED] heparin 25,000 units in dextrose 5% 250 mL (100 units/mL) infusion LOW INTENSITY nomogram - [facility]    [DISCONTINUED] insulin aspart U-100 pen 0-10 Units    [DISCONTINUED] magnesium sulfate 2g in water 50mL IVPB (premix)    [DISCONTINUED] magnesium sulfate 2g in water 50mL IVPB (premix)    [DISCONTINUED] metoprolol succinate (TOPROL-XL) 24 hr tablet 25 mg    [DISCONTINUED] milrinone 20mg in D5W 100 mL infusion    [DISCONTINUED] mupirocin 2 % ointment    [DISCONTINUED] ondansetron disintegrating tablet 8 mg    [DISCONTINUED] potassium chloride SA CR tablet 20 mEq    [DISCONTINUED] sodium chloride 0.9% flush 10 mL    [DISCONTINUED] sodium phosphate 15 mmol in dextrose 5 % (D5W) 250 mL IVPB    [DISCONTINUED] sodium phosphate 20.01 mmol in dextrose 5 % (D5W) 250 mL IVPB    [DISCONTINUED] sodium phosphate 30 mmol in dextrose 5 % (D5W) 250 mL IVPB     Current Outpatient Medications on File Prior to Encounter   Medication Sig    aspirin (ECOTRIN) 81 MG EC tablet Take 81 mg by mouth once daily.    atorvastatin (LIPITOR) 40 MG tablet Take 40 mg by mouth.    carvediloL (COREG) 6.25 MG tablet Take 6.25 mg by mouth 2 (two) times daily.    cinnamon bark 500 mg capsule Take 1,000 mg by mouth once daily.    clopidogrel (PLAVIX) 75 mg tablet Take 75 mg by mouth once daily.    cyanocobalamin 2000 MCG tablet Take 3,000 mcg by mouth once daily.    fish oil-omega-3 fatty acids 300-1,000 mg capsule Take 2 g by mouth once daily.    furosemide (LASIX) 40 MG tablet Take 40 mg by mouth 2 (two) times daily.    gabapentin (NEURONTIN) 300 MG capsule Take 300 mg by mouth nightly as needed.    isosorbide dinitrate (ISORDIL) 30 MG Tab Take 20 mg by mouth 3 (three) times daily.    JARDIANCE 10 mg tablet Take 10 mg by mouth.    metformin (GLUCOPHAGE) 1000 MG tablet Take 1,000 mg by mouth 2 (two) times daily with meals.    metOLazone (ZAROXOLYN) 5 MG tablet Take 5 mg by mouth daily as  needed.    multivitamin capsule Take 1 capsule by mouth once daily.    sacubitriL-valsartan (ENTRESTO) 24-26 mg per tablet Take 1 tablet by mouth 2 (two) times daily.     Family History    None       Tobacco Use    Smoking status: Every Day     Current packs/day: 0.50     Types: Cigarettes    Smokeless tobacco: Not on file   Substance and Sexual Activity    Alcohol use: Yes     Comment: rarely    Drug use: No    Sexual activity: Not on file     Review of Systems   Constitutional: Negative for fever and malaise/fatigue.   HENT:  Negative for congestion and sore throat.    Eyes:  Negative for blurred vision, vision loss in left eye and vision loss in right eye.   Cardiovascular:  Negative for chest pain, dyspnea on exertion, irregular heartbeat, leg swelling, near-syncope, palpitations and syncope.   Respiratory:  Negative for shortness of breath and wheezing.    Endocrine: Negative.    Hematologic/Lymphatic: Negative.    Skin: Negative.    Musculoskeletal:  Negative for arthritis, back pain, joint pain and myalgias.   Gastrointestinal:  Negative for abdominal pain, hematemesis, hematochezia and melena.   Genitourinary: Negative.    Neurological:  Negative for light-headedness and loss of balance.   Psychiatric/Behavioral: Negative.       Objective:     Vital Signs (Most Recent):  Temp: 97.3 °F (36.3 °C) (10/12/23 1951)  Pulse: 86 (10/12/23 1951)  Resp: 17 (10/12/23 1951)  BP: 128/67 (10/12/23 1951)  SpO2: 100 % (10/12/23 1951) Vital Signs (24h Range):  Temp:  [97.3 °F (36.3 °C)-98.1 °F (36.7 °C)] 97.3 °F (36.3 °C)  Pulse:  [69-86] 86  Resp:  [12-19] 17  SpO2:  [95 %-100 %] 100 %  BP: (103-128)/(58-67) 128/67     Weight: 77 kg (169 lb 12.1 oz)  Body mass index is 23.02 kg/m².    SpO2: 100 %       No intake or output data in the 24 hours ending 10/12/23 2026    Lines/Drains/Airways       Peripheral Intravenous Line  Duration                  Peripheral IV - Single Lumen 10/07/23 1109 20 G Left Forearm 5 days          Peripheral IV - Single Lumen 10/11/23 1210 22 G Anterior;Left Hand 1 day                     Physical Exam  Vitals and nursing note reviewed.   Constitutional:       Appearance: Normal appearance. He is normal weight. He is not toxic-appearing.   HENT:      Head: Normocephalic and atraumatic.   Eyes:      General: No scleral icterus.     Extraocular Movements: Extraocular movements intact.   Neck:      Vascular: No carotid bruit.   Cardiovascular:      Rate and Rhythm: Normal rate and regular rhythm.      Pulses: Normal pulses.      Heart sounds: Normal heart sounds. No murmur heard.     No gallop.   Pulmonary:      Effort: Pulmonary effort is normal. No respiratory distress.      Breath sounds: Normal breath sounds. No wheezing or rales.   Abdominal:      General: Abdomen is flat. Bowel sounds are normal.      Palpations: Abdomen is soft. There is no mass.   Musculoskeletal:      Cervical back: Normal range of motion.      Right lower leg: No edema.      Left lower leg: No edema.   Skin:     General: Skin is warm and dry.      Capillary Refill: Capillary refill takes less than 2 seconds.      Findings: No rash.   Neurological:      General: No focal deficit present.      Mental Status: He is alert and oriented to person, place, and time. Mental status is at baseline.          Significant Labs: All pertinent lab results from the last 24 hours have been reviewed.    Significant Imaging: Echocardiogram: Transthoracic echo (TTE) complete (Cupid Only):   Results for orders placed or performed during the hospital encounter of 10/07/23   Echo   Result Value Ref Range    BSA 2.01 m2    LVOT stroke volume 36.56 cm3    LVIDd 6.60 (A) 3.5 - 6.0 cm    LV Systolic Volume 189.69 mL    LV Systolic Volume Index 93.9 mL/m2    LVIDs 6.14 (A) 2.1 - 4.0 cm    LV Diastolic Volume 223.24 mL    LV Diastolic Volume Index 110.51 mL/m2    IVS 0.78 0.6 - 1.1 cm    LVOT diameter 1.90 cm    LVOT area 2.8 cm2    FS 7 (A) 28 - 44 %    Left  Ventricle Relative Wall Thickness 0.26 cm    Posterior Wall 0.86 0.6 - 1.1 cm    LV mass 226.89 g    LV Mass Index 112 g/m2    MV Peak E Zeferino 0.97 m/s    TDI LATERAL 0.08 m/s    TDI SEPTAL 0.02 m/s    E/E' ratio 19.40 m/s    MV Peak A Zeferino 0.36 m/s    TR Max Zeferino 2.59 m/s    E/A ratio 2.69     IVRT 88.49 msec    E wave deceleration time 178.90 msec    LV SEPTAL E/E' RATIO 48.50 m/s    LV LATERAL E/E' RATIO 12.13 m/s    LVOT peak zeferino 0.73 m/s    Left Ventricular Outflow Tract Mean Velocity 0.53 cm/s    Left Ventricular Outflow Tract Mean Gradient 1.27 mmHg    LA size 4.39 cm    Left Atrium Minor Axis 6.97 cm    Left Atrium Major Axis 6.91 cm    LA volume (mod) 111.16 cm3    LA Volume Index (Mod) 55.0 mL/m2    RVDD 2.76 cm    RVOT peak VTI 19.1 cm    TAPSE 1.80 cm    RA Major Axis 5.02 cm    AV regurgitation pressure 1/2 time [id number] ms    AR Max Zeferino 2.70 m/s    AV mean gradient 3 mmHg    AV peak gradient 5 mmHg    Ao peak zeferino 1.15 m/s    Ao VTI 23.20 cm    LVOT peak VTI 12.90 cm    AV valve area 1.58 cm²    AV Velocity Ratio 0.63     AV index (prosthetic) 0.56     ALLAN by Velocity Ratio 1.80 cm²    MV stenosis pressure 1/2 time 51.88 ms    MV valve area p 1/2 method 4.24 cm2    Triscuspid Valve Regurgitation Peak Gradient 27 mmHg    PV mean gradient 2 mmHg    PV PEAK VELOCITY 0.78 m/s    PV peak gradient 2 mmHg    Pulmonary Valve Mean Velocity 0.56 m/s    RVOT peak zeferino 0.75 m/s    Ao root annulus 3.29 cm    STJ 2.90 cm    Ascending aorta 2.79 cm    Mean e' 0.05 m/s    ZLVIDS 4.11     ZLVIDD 1.03     LA Volume Index 59.0 mL/m2    LA volume 119.12 cm3    LA WIDTH 4.6 cm    RA Width 4.0 cm    TV resting pulmonary artery pressure 35 mmHg    RV TB RVSP 11 mmHg    Est. RA pres 8 mmHg    Narrative      Left Ventricle: The left ventricle is severely dilated. Wall is thinner   than normal. Severe global hypokinesis present. There is severely reduced   systolic function with a visually estimated ejection fraction of  15 - 20%.   Grade III diastolic dysfunction.    Left Atrium: Left atrium is severely dilated.    Right Ventricle: Normal right ventricular cavity size. Wall thickness   is normal. Right ventricle wall motion  is normal. Systolic function is   normal. Pacemaker lead present in the ventricle.    Aortic Valve: There is moderate aortic regurgitation.    Mitral Valve: There is mild to moderate regurgitation.    Tricuspid Valve: There is mild regurgitation.    IVC/SVC: Intermediate venous pressure at 8 mmHg.       Assessment and Plan:     * HFrEF (heart failure with reduced ejection fraction)  Pt with known ICM dating back to 2021 (in our records) with an EF of 20-25%.  He presented to OSH with dHF without any improvement in kidney function.  Presents to C on primacor 0.375    RHC 10/10/2023:  RA: 13/ 10/ 9 RV: 73/ 9/ 18 PA: 75/ 36/ 53 PWP: 33/ 49/ 35 .   Cardiac output was 2.33 by thermodilution. Cardiac index is 1.15 L/min/m2.    - d/c primacor given his IVÁN.  Start  at 5  - pt is currently euvolemic on subjectively and clinically.  CVP on bedside TTE was 3 but hard to believe his hemodynamics improved so quickly given RHC hemodynamics from 10/10/2023.  Will hold on lasix for now  - make NPO at midnight for RHC tomorrow (consents will need to be obtained along with case request)  - hold home GDMT: coreg 6.25mg BID, jardiance 10mg qD, entresto 24-26mg BID, and spironolactone (unclear dose)    Ventricular fibrillation  Pt presented to the OSH with near syncopal event followed by ICD discharge.  Noted to have true VFib event terminated by ICD discharge x1.  VFib in the setting of dHF  - admit to [facility] HF team  - formal device interrogation  - pt is s/p 48 IV amio load.  Will transition to 400mg BID PO amiodarone.  Monitor while on tele  - pt saw EP at OSH.  Will hold on EP consult at this time.  Formal device interrogation ordered  - hold on beta blocker while on   - monitor lytes    PAF (paroxysmal atrial  fibrillation)  New diagnosis from OSH hospitalization after the initiation of primacor.  Does have a severely enlarged LA.  CV4  - continue amiodarone 400mg BID for now  - AC with heparin gtt    IVÁN (acute kidney injury)  Likely due to CRS.  Last Cr in 3/2022 normal.  Cr has been hovering around 2.4-2.6 with no improvement.  Pt appears euvolemic.  Unclear if still volume overloaded vs new baseline  - hold on lasix for now along with home GDMT    DM (diabetes mellitus)  Last a1c 7.7.  On PO meds at home  - will start SSI  - AM team to consider formal endocrine c/s    CAD (coronary artery disease)  Pt with known CAD s/p CABG in 2009 (3v, unclear anatomy).  Currently chest pain free  - will continue ASA 81mg qD and plavix 75mg qD  - continue atorvastatin 80mg qD  - will consider ischemic evaluation once pt is clinically stable and kidney function improves        VTE Risk Mitigation (From admission, onward)    None          Chente Ortega MD  Cardiology   [facility] - Cardiology Stepdown

## 2023-10-13 NOTE — PROGRESS NOTES
Cardiac device interrogation and/or reprogramming completed by industry representative, Najma with [employer]; please refer to report located in the Media tab.

## 2023-10-13 NOTE — ASSESSMENT & PLAN NOTE
Likely due to CRS.  Last Cr in 3/2022 normal.  Cr has been hovering around 2.4-2.6 with no improvement.  Pt appears euvolemic.  Unclear if still volume overloaded vs new baseline  - hold on lasix for now along with home GDMT

## 2023-10-13 NOTE — PROCEDURES
Radha Abbott is a 63 y.o. male patient.    Temp: 98.1 °F (36.7 °C) (10/13/23 1548)  Pulse: 81 (10/13/23 1548)  Resp: 18 (10/13/23 1548)  BP: (!) 145/79 (10/13/23 1548)  SpO2: 100 % (10/13/23 1548)  Weight: 77 kg (169 lb 12.1 oz) (10/13/23 0500)  Height: 6' (182.9 cm) (10/12/23 1951)    PICC  Date/Time: 10/13/2023 3:42 PM  Performed by: Maxine Paige RN  Assisting provider: Kobe Otero RN  Consent Done: Yes  Time out: Immediately prior to procedure a time out was called to verify the correct patient, procedure, equipment, support staff and site/side marked as required  Indications: med administration and vascular access  Anesthesia: local infiltration  Local anesthetic: lidocaine 1% without epinephrine  Anesthetic Total (mL): 3  Description of findings: PICC  Preparation: skin prepped with ChloraPrep  Skin prep agent dried: skin prep agent completely dried prior to procedure  Sterile barriers: all five maximum sterile barriers used - cap, mask, sterile gown, sterile gloves, and large sterile sheet  Hand hygiene: hand hygiene performed prior to central venous catheter insertion  Location details: right basilic  Catheter type: double lumen  Catheter size: 5 Fr  Catheter Length: 37cm    Ultrasound guidance: yes  Vessel Caliber: medium and patent, compressibility normal  Vascular Doppler: not done  Needle advanced into vessel with real time Ultrasound guidance.  Guidewire confirmed in vessel.  Image recorded and saved.  Sterile sheath used.  Number of attempts: 1  Post-procedure: blood return through all ports, chlorhexidine patch and sterile dressing applied  Technical procedures used: 3CG  Specimens: No  Implants: No  Assessment: placement verified by x-ray  Complications: none          Name   10/13/2023

## 2023-10-13 NOTE — NURSING
0918: Heparin drip discontinued per order. Family and patient aware of plan for RHC at 1315 today.     1012: new PIV obtained. No needs voiced.     1120: Aware of new order for 1500ml Fluid restriction/day. Pt verbalized understanding    1200; Patient taken for RHC    1245: Pt back from RHC, ambulated from stretcher to bed, denies pain. Right IJ site with 4x4 and transparent film clean,dry,intact. Wife at bedside. Pt sitting on side of bed to eat lunch

## 2023-10-13 NOTE — NURSING
Nurses Note -- 4 Eyes      10/13/2023   6:05 AM      Skin assessed during: Admit      [x] No Altered Skin Integrity Present    [x]Prevention Measures Documented      [] Yes- Altered Skin Integrity Present or Discovered   [] LDA Added if Not in Epic (Describe Wound)   [] New Altered Skin Integrity was Present on Admit and Documented in LDA   [] Wound Image Taken    Wound Care Consulted? No    Attending Nurse:  KYAW Marquez    Second RN/Staff Member:  KYAW Bentley

## 2023-10-13 NOTE — ASSESSMENT & PLAN NOTE
Pt presented to the OSH with near syncopal event followed by ICD discharge.  Noted to have true VFib event terminated by ICD discharge x1.  VFib in the setting of dHF  - admit to [facility] HF team  - formal device interrogation  - pt is s/p 48 IV amio load.  Will transition to 400mg BID PO amiodarone.  Monitor while on tele  - pt saw EP at OSH.  Will hold on EP consult at this time.  Formal device interrogation ordered  - hold on beta blocker while on   - monitor gino

## 2023-10-13 NOTE — PLAN OF CARE
[facility]   Heart Transplant Clinic  [address] [place] [address]   [phone] after hours        HOME  HEALTH ORDERS      Admit to Home Health    Diagnosis:   Patient Active Problem List   Diagnosis    Ventricular tachycardia    CAD (coronary artery disease)    HFrEF (heart failure with reduced ejection fraction)    DM (diabetes mellitus)    Hypokalemia    IVÁN (acute kidney injury)    PAF (paroxysmal atrial fibrillation)    Ventricular fibrillation       Patient is homebound due to:   Diet: Low Sodium  Acitivities: As Tolerated    Nursing:   SN to complete comprehensive assessment including routine vital signs. Instruct on disease process and s/s of complications to report to MD. Review/verify medication list sent home with the patient at time of discharge  and instruct patient/caregiver as needed. Frequency may be adjusted depending on start of care date.    Notify MD if SBP > 160 or < 90; DBP > 90 or < 50; HR > 120 or < 50; Temp > 101; Weight gain >3lbs in 1 day or 5lbs in 1 week.    LABS:  SN to perform labs: Weekly BMP, CBC, Mag, BNP    HOME INFUSION THERAPY:  SN to perform Infusion Therapy/Central Line Care.  Review Central Line Care & Central Line Flush with patient.    Administer (drug and dose): Dobutamine, 5 mcg/kg/min, IV continuous. Dosing weight 77 kg.     **For questions or concerns, please call [phone] and ask for Pre-Heart transplant clinic, M-F 8-5. After hours, weekends, call [phone] and ask for the Heart Transplant Cardiologist on call.**    Central line care:  Scrub the Hub: Prior to accessing the line, always perform a 30 second alcohol scrub  Each lumen of the central line is to be flushed at least daily with 10 mL Normal Saline and 3 mL Heparin flush (100 units/mL)  Skilled Nurse (SN) may draw blood from IV access  Blood Draw Procedure:   - Aspirate at least 5 mL of blood   - Discard   - Obtain specimen   - Change posiflow  cap   - Flush with 20 mL Normal Saline followed by a                 3-5 mL Heparin flush (100 units/mL)  Central :   - Sterile dressing changes are done weekly and as needed.   - Use chlor-hexadine scrub to cleanse site, apply Biopatch to insertion site,       apply securement device dressing   - Posi-flow caps are changed weekly and after EVERY lab draw.   - If sterile gauze is under dressing to control oozing,                 dressing change must be performed every 24 hours until gauze is not needed.    CONSULTS:      Send initial Home Health orders to [facility] attending physician on call.  Send follow up questions to [phone] or fax:               Pre Transplant:   [phone]

## 2023-10-13 NOTE — INTERVAL H&P NOTE
The patient has been examined and the H&P has been reviewed:    I concur with the findings and no changes have occurred since H&P was written.    Procedure risks, benefits and alternative options discussed and understood by patient/family.          Active Hospital Problems    Diagnosis  POA    *HFrEF (heart failure with reduced ejection fraction) [I50.20]  Yes    Ventricular fibrillation [I49.01]  Unknown    PAF (paroxysmal atrial fibrillation) [I48.0]  Yes    IVÁN (acute kidney injury) [N17.9]  Yes    CAD (coronary artery disease) [I25.10]  Yes     Continue home medications        DM (diabetes mellitus) [E11.9]  Yes     Resume home meds  SSI        Resolved Hospital Problems   No resolved problems to display.

## 2023-10-13 NOTE — PROGRESS NOTES
Update/Discharge Note:    SW met with pt and his wife. SW informed of possible dc over the weekend.  No dc needs indicated per team.   Provided advanced directives packet and educated on completing forms. Pt and wife to review and notify SW if they have further needs.  Pt and wife have ride home at dc.   Pt and wife inquired about having a Dexcom monitor for his blood sugars at home.  SW advised pt to speak with team, but PCP may need to order this for him.  Advised pt to contact his PCP to discuss his desire for a rx for this.  Pt and his wife voiced understanding and agreement.     No other needs indicated at this time.

## 2023-10-13 NOTE — PLAN OF CARE
Problem: Diabetes Comorbidity  Goal: Blood Glucose Level Within Targeted Range  Outcome: Ongoing, Progressing     Problem: Fall Injury Risk  Goal: Absence of Fall and Fall-Related Injury  Outcome: Ongoing, Progressing   RN spoke with pt and  wife regarding plan of action for tonight. Pt is to be NPO at [facility] for right heart cath in am. Pt and wife verbalized understanding. Pt Q6 accuchecks. Blood sugar at [facility] is 198 and per insulin order, no insulin is to be given. Dobutamine at 5.8ml/hr. Per Cardiology, keep pt on heparin infusion until one hour before heart cath. Wife at bedside. No complaints at this time.

## 2023-10-13 NOTE — ASSESSMENT & PLAN NOTE
New diagnosis from OSH hospitalization after the initiation of primacor.  Does have a severely enlarged LA.  CV4  - continue amiodarone 400mg BID for now  - AC with heparin gtt

## 2023-10-13 NOTE — TELEPHONE ENCOUNTER
Rounds Report: I attended interdisciplinary rounds this afternoon with the transplant team including SW, physicians, fellows, mid-level providers, and transplant coordinators. Discussed plan of care; asked to capture a referral for Advanced Heart Failure Options (VAD and/or OHT).  As pt is OON, will ask FC to assist with in network providers.

## 2023-10-13 NOTE — ASSESSMENT & PLAN NOTE
Pt with known CAD s/p CABG in 2009 (3v, unclear anatomy).  Currently chest pain free  - will continue ASA 81mg qD and plavix 75mg qD  - continue atorvastatin 80mg qD  - will consider ischemic evaluation once pt is clinically stable and kidney function improves

## 2023-10-13 NOTE — SUBJECTIVE & OBJECTIVE
Past Medical History:   Diagnosis Date    CAD (coronary artery disease)     Diabetes mellitus     HFrEF (heart failure with reduced ejection fraction)     ICD (implantable cardioverter-defibrillator) in place     MI, old        Past Surgical History:   Procedure Laterality Date    CARDIAC SURGERY      RIGHT HEART CATHETERIZATION Right 10/10/2023    Procedure: INSERTION, CATHETER, RIGHT HEART;  Surgeon: Bin Gandhi MD;  Location: [facility] CATH LAB;  Service: Cardiology;  Laterality: Right;       Review of patient's allergies indicates:  No Known Allergies    Current Facility-Administered Medications on File Prior to Encounter   Medication    [DISCONTINUED] acetaminophen tablet 650 mg    [DISCONTINUED] aspirin EC tablet 81 mg    [DISCONTINUED] atorvastatin tablet 40 mg    [DISCONTINUED] calcium gluconate 1 g in NS IVPB (premixed)    [DISCONTINUED] calcium gluconate 1 g in NS IVPB (premixed)    [DISCONTINUED] calcium gluconate 1 g in NS IVPB (premixed)    [DISCONTINUED] clopidogreL tablet 75 mg    [DISCONTINUED] dextrose 10% bolus 125 mL 125 mL    [DISCONTINUED] dextrose 10% bolus 250 mL 250 mL    [DISCONTINUED] empagliflozin (Jardiance) tablet 10 mg    [DISCONTINUED] famotidine tablet 20 mg    [DISCONTINUED] furosemide injection 40 mg    [DISCONTINUED] glucagon (human recombinant) injection 1 mg    [DISCONTINUED] heparin 25,000 units in dextrose 5% (100 units/ml) IV bolus from bag - ADDITIONAL PRN BOLUS - 30 units/kg    [DISCONTINUED] heparin 25,000 units in dextrose 5% (100 units/ml) IV bolus from bag - ADDITIONAL PRN BOLUS - 60 units/kg    [DISCONTINUED] heparin 25,000 units in dextrose 5% 250 mL (100 units/mL) infusion LOW INTENSITY nomogram - [facility]    [DISCONTINUED] insulin aspart U-100 pen 0-10 Units    [DISCONTINUED] magnesium sulfate 2g in water 50mL IVPB (premix)    [DISCONTINUED] magnesium sulfate 2g in water 50mL IVPB (premix)    [DISCONTINUED] metoprolol succinate (TOPROL-XL) 24 hr tablet 25 mg     [DISCONTINUED] milrinone 20mg in D5W 100 mL infusion    [DISCONTINUED] mupirocin 2 % ointment    [DISCONTINUED] ondansetron disintegrating tablet 8 mg    [DISCONTINUED] potassium chloride SA CR tablet 20 mEq    [DISCONTINUED] sodium chloride 0.9% flush 10 mL    [DISCONTINUED] sodium phosphate 15 mmol in dextrose 5 % (D5W) 250 mL IVPB    [DISCONTINUED] sodium phosphate 20.01 mmol in dextrose 5 % (D5W) 250 mL IVPB    [DISCONTINUED] sodium phosphate 30 mmol in dextrose 5 % (D5W) 250 mL IVPB     Current Outpatient Medications on File Prior to Encounter   Medication Sig    aspirin (ECOTRIN) 81 MG EC tablet Take 81 mg by mouth once daily.    atorvastatin (LIPITOR) 40 MG tablet Take 40 mg by mouth.    carvediloL (COREG) 6.25 MG tablet Take 6.25 mg by mouth 2 (two) times daily.    cinnamon bark 500 mg capsule Take 1,000 mg by mouth once daily.    clopidogrel (PLAVIX) 75 mg tablet Take 75 mg by mouth once daily.    cyanocobalamin 2000 MCG tablet Take 3,000 mcg by mouth once daily.    fish oil-omega-3 fatty acids 300-1,000 mg capsule Take 2 g by mouth once daily.    furosemide (LASIX) 40 MG tablet Take 40 mg by mouth 2 (two) times daily.    gabapentin (NEURONTIN) 300 MG capsule Take 300 mg by mouth nightly as needed.    isosorbide dinitrate (ISORDIL) 30 MG Tab Take 20 mg by mouth 3 (three) times daily.    JARDIANCE 10 mg tablet Take 10 mg by mouth.    metformin (GLUCOPHAGE) 1000 MG tablet Take 1,000 mg by mouth 2 (two) times daily with meals.    metOLazone (ZAROXOLYN) 5 MG tablet Take 5 mg by mouth daily as needed.    multivitamin capsule Take 1 capsule by mouth once daily.    sacubitriL-valsartan (ENTRESTO) 24-26 mg per tablet Take 1 tablet by mouth 2 (two) times daily.     Family History    None       Tobacco Use    Smoking status: Every Day     Current packs/day: 0.50     Types: Cigarettes    Smokeless tobacco: Not on file   Substance and Sexual Activity    Alcohol use: Yes     Comment: rarely    Drug use: No    Sexual  activity: Not on file     Review of Systems   Constitutional: Negative for fever and malaise/fatigue.   HENT:  Negative for congestion and sore throat.    Eyes:  Negative for blurred vision, vision loss in left eye and vision loss in right eye.   Cardiovascular:  Negative for chest pain, dyspnea on exertion, irregular heartbeat, leg swelling, near-syncope, palpitations and syncope.   Respiratory:  Negative for shortness of breath and wheezing.    Endocrine: Negative.    Hematologic/Lymphatic: Negative.    Skin: Negative.    Musculoskeletal:  Negative for arthritis, back pain, joint pain and myalgias.   Gastrointestinal:  Negative for abdominal pain, hematemesis, hematochezia and melena.   Genitourinary: Negative.    Neurological:  Negative for light-headedness and loss of balance.   Psychiatric/Behavioral: Negative.       Objective:     Vital Signs (Most Recent):  Temp: 97.3 °F (36.3 °C) (10/12/23 1951)  Pulse: 86 (10/12/23 1951)  Resp: 17 (10/12/23 1951)  BP: 128/67 (10/12/23 1951)  SpO2: 100 % (10/12/23 1951) Vital Signs (24h Range):  Temp:  [97.3 °F (36.3 °C)-98.1 °F (36.7 °C)] 97.3 °F (36.3 °C)  Pulse:  [69-86] 86  Resp:  [12-19] 17  SpO2:  [95 %-100 %] 100 %  BP: (103-128)/(58-67) 128/67     Weight: 77 kg (169 lb 12.1 oz)  Body mass index is 23.02 kg/m².    SpO2: 100 %       No intake or output data in the 24 hours ending 10/12/23 2026    Lines/Drains/Airways       Peripheral Intravenous Line  Duration                  Peripheral IV - Single Lumen 10/07/23 1109 20 G Left Forearm 5 days         Peripheral IV - Single Lumen 10/11/23 1210 22 G Anterior;Left Hand 1 day                     Physical Exam  Vitals and nursing note reviewed.   Constitutional:       Appearance: Normal appearance. He is normal weight. He is not toxic-appearing.   HENT:      Head: Normocephalic and atraumatic.   Eyes:      General: No scleral icterus.     Extraocular Movements: Extraocular movements intact.   Neck:      Vascular: No  carotid bruit.   Cardiovascular:      Rate and Rhythm: Normal rate and regular rhythm.      Pulses: Normal pulses.      Heart sounds: Normal heart sounds. No murmur heard.     No gallop.   Pulmonary:      Effort: Pulmonary effort is normal. No respiratory distress.      Breath sounds: Normal breath sounds. No wheezing or rales.   Abdominal:      General: Abdomen is flat. Bowel sounds are normal.      Palpations: Abdomen is soft. There is no mass.   Musculoskeletal:      Cervical back: Normal range of motion.      Right lower leg: No edema.      Left lower leg: No edema.   Skin:     General: Skin is warm and dry.      Capillary Refill: Capillary refill takes less than 2 seconds.      Findings: No rash.   Neurological:      General: No focal deficit present.      Mental Status: He is alert and oriented to person, place, and time. Mental status is at baseline.          Significant Labs: All pertinent lab results from the last 24 hours have been reviewed.    Significant Imaging: Echocardiogram: Transthoracic echo (TTE) complete (Cupid Only):   Results for orders placed or performed during the hospital encounter of 10/07/23   Echo   Result Value Ref Range    BSA 2.01 m2    LVOT stroke volume 36.56 cm3    LVIDd 6.60 (A) 3.5 - 6.0 cm    LV Systolic Volume 189.69 mL    LV Systolic Volume Index 93.9 mL/m2    LVIDs 6.14 (A) 2.1 - 4.0 cm    LV Diastolic Volume 223.24 mL    LV Diastolic Volume Index 110.51 mL/m2    IVS 0.78 0.6 - 1.1 cm    LVOT diameter 1.90 cm    LVOT area 2.8 cm2    FS 7 (A) 28 - 44 %    Left Ventricle Relative Wall Thickness 0.26 cm    Posterior Wall 0.86 0.6 - 1.1 cm    LV mass 226.89 g    LV Mass Index 112 g/m2    MV Peak E Zeferino 0.97 m/s    TDI LATERAL 0.08 m/s    TDI SEPTAL 0.02 m/s    E/E' ratio 19.40 m/s    MV Peak A Zeferino 0.36 m/s    TR Max Zeferino 2.59 m/s    E/A ratio 2.69     IVRT 88.49 msec    E wave deceleration time 178.90 msec    LV SEPTAL E/E' RATIO 48.50 m/s    LV LATERAL E/E' RATIO 12.13 m/s    LVOT  peak zeferino 0.73 m/s    Left Ventricular Outflow Tract Mean Velocity 0.53 cm/s    Left Ventricular Outflow Tract Mean Gradient 1.27 mmHg    LA size 4.39 cm    Left Atrium Minor Axis 6.97 cm    Left Atrium Major Axis 6.91 cm    LA volume (mod) 111.16 cm3    LA Volume Index (Mod) 55.0 mL/m2    RVDD 2.76 cm    RVOT peak VTI 19.1 cm    TAPSE 1.80 cm    RA Major Axis 5.02 cm    AV regurgitation pressure 1/2 time [id number] ms    AR Max Zeferino 2.70 m/s    AV mean gradient 3 mmHg    AV peak gradient 5 mmHg    Ao peak zeferino 1.15 m/s    Ao VTI 23.20 cm    LVOT peak VTI 12.90 cm    AV valve area 1.58 cm²    AV Velocity Ratio 0.63     AV index (prosthetic) 0.56     ALLAN by Velocity Ratio 1.80 cm²    MV stenosis pressure 1/2 time 51.88 ms    MV valve area p 1/2 method 4.24 cm2    Triscuspid Valve Regurgitation Peak Gradient 27 mmHg    PV mean gradient 2 mmHg    PV PEAK VELOCITY 0.78 m/s    PV peak gradient 2 mmHg    Pulmonary Valve Mean Velocity 0.56 m/s    RVOT peak zeferino 0.75 m/s    Ao root annulus 3.29 cm    STJ 2.90 cm    Ascending aorta 2.79 cm    Mean e' 0.05 m/s    ZLVIDS 4.11     ZLVIDD 1.03     LA Volume Index 59.0 mL/m2    LA volume 119.12 cm3    LA WIDTH 4.6 cm    RA Width 4.0 cm    TV resting pulmonary artery pressure 35 mmHg    RV TB RVSP 11 mmHg    Est. RA pres 8 mmHg    Narrative      Left Ventricle: The left ventricle is severely dilated. Wall is thinner   than normal. Severe global hypokinesis present. There is severely reduced   systolic function with a visually estimated ejection fraction of 15 - 20%.   Grade III diastolic dysfunction.    Left Atrium: Left atrium is severely dilated.    Right Ventricle: Normal right ventricular cavity size. Wall thickness   is normal. Right ventricle wall motion  is normal. Systolic function is   normal. Pacemaker lead present in the ventricle.    Aortic Valve: There is moderate aortic regurgitation.    Mitral Valve: There is mild to moderate regurgitation.    Tricuspid Valve:  There is mild regurgitation.    IVC/SVC: Intermediate venous pressure at 8 mmHg.

## 2023-10-13 NOTE — SUBJECTIVE & OBJECTIVE
Interval History: Admitted yesterday as a transfer from [place]. He feels well this AM. Transferred over on milrinone, since switched to  5 due to IVÁN. RHC today on  5: RA 2, RV 43/2, PA 35/16, PCWP 12 CO 4.0, CI 2.0.     Continuous Infusions:   DOBUTamine IV infusion (non-titrating) 5 mcg/kg/min (10/12/23 2110)     Scheduled Meds:   aspirin  81 mg Oral Daily    atorvastatin  80 mg Oral Daily    clopidogreL  75 mg Oral Daily     PRN Meds:dextrose 10%, dextrose 10%, glucagon (human recombinant), insulin aspart U-100, LIDOcaine HCL 20 mg/ml (2%)    Review of patient's allergies indicates:  No Known Allergies  Objective:     Vital Signs (Most Recent):  Temp: 98.1 °F (36.7 °C) (10/13/23 1130)  Pulse: 85 (10/13/23 1133)  Resp: 18 (10/13/23 1130)  BP: (!) 116/57 (10/13/23 1130)  SpO2: 97 % (10/13/23 1130) Vital Signs (24h Range):  Temp:  [97.3 °F (36.3 °C)-98.4 °F (36.9 °C)] 98.1 °F (36.7 °C)  Pulse:  [74-96] 85  Resp:  [17-18] 18  SpO2:  [97 %-100 %] 97 %  BP: (100-129)/(57-75) 116/57     Patient Vitals for the past 72 hrs (Last 3 readings):   Weight   10/13/23 0500 77 kg (169 lb 12.1 oz)   10/12/23 1951 77 kg (169 lb 12.1 oz)     Body mass index is 23.02 kg/m².      Intake/Output Summary (Last 24 hours) at 10/13/2023 1227  Last data filed at 10/13/2023 0918  Gross per 24 hour   Intake 340 ml   Output 150 ml   Net 190 ml            Physical Exam  Constitutional:       Appearance: Normal appearance.   HENT:      Head: Normocephalic and atraumatic.   Eyes:      Pupils: Pupils are equal, round, and reactive to light.   Neck:      Vascular: No JVD.      Comments: JVP does not appear elevated.   Cardiovascular:      Rate and Rhythm: Normal rate and regular rhythm.      Pulses: Normal pulses.      Heart sounds: Normal heart sounds.   Pulmonary:      Effort: Pulmonary effort is normal.      Breath sounds: Normal breath sounds.   Abdominal:      General: Bowel sounds are normal. There is no distension.      Palpations:  "Abdomen is soft.      Tenderness: There is no abdominal tenderness.   Musculoskeletal:         General: Normal range of motion.      Cervical back: Normal range of motion and neck supple.      Right lower leg: No edema.      Left lower leg: No edema.   Skin:     General: Skin is warm and dry.      Capillary Refill: Capillary refill takes less than 2 seconds.   Neurological:      General: No focal deficit present.      Mental Status: He is alert and oriented to person, place, and time.   Psychiatric:         Mood and Affect: Mood normal.         Behavior: Behavior normal.            Significant Labs:  CBC:  Recent Labs   Lab 10/12/23  0532 10/13/23  0321 10/13/23  0609   WBC 3.33* 3.29* 3.65*   RBC 4.01* 4.16* 4.20*   HGB 12.3* 12.5* 12.8*   HCT 35.4* 36.6* 37.8*    195 191   MCV 88 88 90   MCH 30.7 30.0 30.5   MCHC 34.7 34.2 33.9     BNP:  Recent Labs   Lab 10/07/23  1109 10/10/23  0627   * 690*     CMP:  Recent Labs   Lab 10/07/23  1109 10/08/23  0311 10/12/23  0532 10/12/23  1956 10/13/23  0321   *   < > 172* 155* 128*   CALCIUM 10.1   < > 9.0 10.2 9.4   ALBUMIN 4.4  --   --   --   --    PROT 8.7*  --   --   --   --       < > 139 139 138   K 3.7   < > 3.7 4.4 3.5   CO2 26   < > 25 25 23   CL 97   < > 99 102 103   BUN 29*   < > 42* 43* 41*   CREATININE 2.2*   < > 2.6* 2.8* 2.4*   ALKPHOS 74  --   --   --   --    ALT 22  --   --   --   --    AST 23  --   --   --   --    BILITOT 0.5  --   --   --   --     < > = values in this interval not displayed.      Coagulation:   Recent Labs   Lab 10/11/23  1038 10/11/23  1758 10/12/23  0532 10/13/23  0321 10/13/23  0609   INR 1.0  --   --  1.0 1.0   APTT 27.3   < > 46.9* 38.9* 38.4*    < > = values in this interval not displayed.     LDH:  No results for input(s): "LDH" in the last 72 hours.  Microbiology:  Microbiology Results (last 7 days)       ** No results found for the last 168 hours. **            I have reviewed all pertinent labs within the " past 24 hours.    Estimated Creatinine Clearance: 35.2 mL/min (A) (based on SCr of 2.4 mg/dL (H)).    Diagnostic Results:  I have reviewed all pertinent imaging results/findings within the past 24 hours.

## 2023-10-13 NOTE — PROGRESS NOTES
[facility] - Cardiology Stepdown  Heart Transplant  Progress Note    Patient Name: Radha Abbott  MRN: [id number]  Admission Date: 10/12/2023  Hospital Length of Stay: 1 days  Attending Physician: Josie Downing DO  Primary Care Provider: Vasu Kong MD  Principal Problem:HFrEF (heart failure with reduced ejection fraction)    Subjective:     Interval History: Admitted yesterday as a transfer from [place]. He feels well this AM. Transferred over on milrinone, since switched to  5 due to IVÁN. RHC today on  5: RA 2, RV 43/2, PA 35/16, PCWP 12 CO 4.0, CI 2.0.     Continuous Infusions:   DOBUTamine IV infusion (non-titrating) 5 mcg/kg/min (10/12/23 2110)     Scheduled Meds:   aspirin  81 mg Oral Daily    atorvastatin  80 mg Oral Daily    clopidogreL  75 mg Oral Daily     PRN Meds:dextrose 10%, dextrose 10%, glucagon (human recombinant), insulin aspart U-100, LIDOcaine HCL 20 mg/ml (2%)    Review of patient's allergies indicates:  No Known Allergies  Objective:     Vital Signs (Most Recent):  Temp: 98.1 °F (36.7 °C) (10/13/23 1130)  Pulse: 85 (10/13/23 1133)  Resp: 18 (10/13/23 1130)  BP: (!) 116/57 (10/13/23 1130)  SpO2: 97 % (10/13/23 1130) Vital Signs (24h Range):  Temp:  [97.3 °F (36.3 °C)-98.4 °F (36.9 °C)] 98.1 °F (36.7 °C)  Pulse:  [74-96] 85  Resp:  [17-18] 18  SpO2:  [97 %-100 %] 97 %  BP: (100-129)/(57-75) 116/57     Patient Vitals for the past 72 hrs (Last 3 readings):   Weight   10/13/23 0500 77 kg (169 lb 12.1 oz)   10/12/23 1951 77 kg (169 lb 12.1 oz)     Body mass index is 23.02 kg/m².      Intake/Output Summary (Last 24 hours) at 10/13/2023 1227  Last data filed at 10/13/2023 0918  Gross per 24 hour   Intake 340 ml   Output 150 ml   Net 190 ml            Physical Exam  Constitutional:       Appearance: Normal appearance.   HENT:      Head: Normocephalic and atraumatic.   Eyes:      Pupils: Pupils are equal, round, and reactive to light.   Neck:      Vascular: No JVD.       Comments: JVP does not appear elevated.   Cardiovascular:      Rate and Rhythm: Normal rate and regular rhythm.      Pulses: Normal pulses.      Heart sounds: Normal heart sounds.   Pulmonary:      Effort: Pulmonary effort is normal.      Breath sounds: Normal breath sounds.   Abdominal:      General: Bowel sounds are normal. There is no distension.      Palpations: Abdomen is soft.      Tenderness: There is no abdominal tenderness.   Musculoskeletal:         General: Normal range of motion.      Cervical back: Normal range of motion and neck supple.      Right lower leg: No edema.      Left lower leg: No edema.   Skin:     General: Skin is warm and dry.      Capillary Refill: Capillary refill takes less than 2 seconds.   Neurological:      General: No focal deficit present.      Mental Status: He is alert and oriented to person, place, and time.   Psychiatric:         Mood and Affect: Mood normal.         Behavior: Behavior normal.            Significant Labs:  CBC:  Recent Labs   Lab 10/12/23  0532 10/13/23  0321 10/13/23  0609   WBC 3.33* 3.29* 3.65*   RBC 4.01* 4.16* 4.20*   HGB 12.3* 12.5* 12.8*   HCT 35.4* 36.6* 37.8*    195 191   MCV 88 88 90   MCH 30.7 30.0 30.5   MCHC 34.7 34.2 33.9     BNP:  Recent Labs   Lab 10/07/23  1109 10/10/23  0627   * 690*     CMP:  Recent Labs   Lab 10/07/23  1109 10/08/23  0311 10/12/23  0532 10/12/23  1956 10/13/23  0321   *   < > 172* 155* 128*   CALCIUM 10.1   < > 9.0 10.2 9.4   ALBUMIN 4.4  --   --   --   --    PROT 8.7*  --   --   --   --       < > 139 139 138   K 3.7   < > 3.7 4.4 3.5   CO2 26   < > 25 25 23   CL 97   < > 99 102 103   BUN 29*   < > 42* 43* 41*   CREATININE 2.2*   < > 2.6* 2.8* 2.4*   ALKPHOS 74  --   --   --   --    ALT 22  --   --   --   --    AST 23  --   --   --   --    BILITOT 0.5  --   --   --   --     < > = values in this interval not displayed.      Coagulation:   Recent Labs   Lab 10/11/23  1038 10/11/23  8420  "10/12/23  0532 10/13/23  0321 10/13/23  0609   INR 1.0  --   --  1.0 1.0   APTT 27.3   < > 46.9* 38.9* 38.4*    < > = values in this interval not displayed.     LDH:  No results for input(s): "LDH" in the last 72 hours.  Microbiology:  Microbiology Results (last 7 days)       ** No results found for the last 168 hours. **            I have reviewed all pertinent labs within the past 24 hours.    Estimated Creatinine Clearance: 35.2 mL/min (A) (based on SCr of 2.4 mg/dL (H)).    Diagnostic Results:  I have reviewed all pertinent imaging results/findings within the past 24 hours.    Assessment and Plan:     No notes on file    * HFrEF (heart failure with reduced ejection fraction)  Ischemic Cardiomyopathy  Euvolemic on exam  Home GDMT w/ Entresto, Isordil, Aldactone, Jardiance, Coreg (currently on hold).  RHC findings this AM on  5 as follows: RA: 2 RV: 43/2 PA: 35/16, mean 23 PCWP: 12    Continue to hold diuresis   Will get PICC line for home inotropes. Will plan for D/C once HH therapies are set up.   Unfortunately out of network for advanced options and will need to be evaluated and treated surgically at other facility if advanced therapies are to be pursued.     IVÁN (acute kidney injury)  Normal kidney function in march 2022. Unclear if IVÁN due to cardiorenal or new baseline, but improving with addition of inotrope. Hold diuresis and nephrotoxic medications.     DM (diabetes mellitus)  On PO glycemic control at home. Will cover with SSI while admitted.     CAD (coronary artery disease)  Continue DAPT    Ventricular tachycardia  S/p 48 hour IV amio load at OSH. Transition to 400 PO mg amio BID.         Jimy An PA-C  Heart Transplant  [facility] - Cardiology Stepdown  "

## 2023-10-14 ENCOUNTER — TELEPHONE (OUTPATIENT)
Dept: TRANSPLANT | Facility: HOSPITAL | Age: 63
End: 2023-10-14
Payer: MEDICAID

## 2023-10-14 LAB
ALLENS TEST: ABNORMAL
ANION GAP SERPL CALC-SCNC: 14 MMOL/L (ref 8–16)
APTT PPP: 27.8 SEC (ref 21–32)
BASOPHILS # BLD AUTO: 0.03 K/UL (ref 0–0.2)
BASOPHILS NFR BLD: 0.8 % (ref 0–1.9)
BUN SERPL-MCNC: 37 MG/DL (ref 8–23)
CALCIUM SERPL-MCNC: 9.3 MG/DL (ref 8.7–10.5)
CHLORIDE SERPL-SCNC: 101 MMOL/L (ref 95–110)
CO2 SERPL-SCNC: 24 MMOL/L (ref 23–29)
CREAT SERPL-MCNC: 2 MG/DL (ref 0.5–1.4)
DIFFERENTIAL METHOD: ABNORMAL
EOSINOPHIL # BLD AUTO: 0.1 K/UL (ref 0–0.5)
EOSINOPHIL NFR BLD: 1.3 % (ref 0–8)
ERYTHROCYTE [DISTWIDTH] IN BLOOD BY AUTOMATED COUNT: 13.9 % (ref 11.5–14.5)
EST. GFR  (NO RACE VARIABLE): 37.3 ML/MIN/1.73 M^2
GLUCOSE SERPL-MCNC: 114 MG/DL (ref 70–110)
HCO3 UR-SCNC: 26.2 MMOL/L (ref 24–28)
HCT VFR BLD AUTO: 36.3 % (ref 40–54)
HCT VFR BLD CALC: 36 %PCV (ref 36–54)
HGB BLD-MCNC: 12.6 G/DL (ref 14–18)
IMM GRANULOCYTES # BLD AUTO: 0.01 K/UL (ref 0–0.04)
IMM GRANULOCYTES NFR BLD AUTO: 0.3 % (ref 0–0.5)
INR PPP: 1 (ref 0.8–1.2)
LYMPHOCYTES # BLD AUTO: 1.4 K/UL (ref 1–4.8)
LYMPHOCYTES NFR BLD: 37.1 % (ref 18–48)
MAGNESIUM SERPL-MCNC: 2.4 MG/DL (ref 1.6–2.6)
MCH RBC QN AUTO: 30.7 PG (ref 27–31)
MCHC RBC AUTO-ENTMCNC: 34.7 G/DL (ref 32–36)
MCV RBC AUTO: 89 FL (ref 82–98)
MONOCYTES # BLD AUTO: 0.5 K/UL (ref 0.3–1)
MONOCYTES NFR BLD: 13.6 % (ref 4–15)
NEUTROPHILS # BLD AUTO: 1.8 K/UL (ref 1.8–7.7)
NEUTROPHILS NFR BLD: 46.9 % (ref 38–73)
NRBC BLD-RTO: 0 /100 WBC
PCO2 BLDA: 40 MMHG (ref 35–45)
PH SMN: 7.42 [PH] (ref 7.35–7.45)
PHOSPHATE SERPL-MCNC: 3.7 MG/DL (ref 2.7–4.5)
PLATELET # BLD AUTO: 199 K/UL (ref 150–450)
PMV BLD AUTO: 11.3 FL (ref 9.2–12.9)
PO2 BLDA: 34 MMHG (ref 40–60)
POC BE: 2 MMOL/L
POC IONIZED CALCIUM: 1.23 MMOL/L (ref 1.06–1.42)
POC SATURATED O2: 67 % (ref 95–100)
POC TCO2: 27 MMOL/L (ref 24–29)
POCT GLUCOSE: 142 MG/DL (ref 70–110)
POCT GLUCOSE: 178 MG/DL (ref 70–110)
POCT GLUCOSE: 200 MG/DL (ref 70–110)
POCT GLUCOSE: 210 MG/DL (ref 70–110)
POTASSIUM BLD-SCNC: 4.3 MMOL/L (ref 3.5–5.1)
POTASSIUM SERPL-SCNC: 3.8 MMOL/L (ref 3.5–5.1)
PROTHROMBIN TIME: 10.4 SEC (ref 9–12.5)
RBC # BLD AUTO: 4.1 M/UL (ref 4.6–6.2)
SAMPLE: ABNORMAL
SITE: ABNORMAL
SODIUM BLD-SCNC: 137 MMOL/L (ref 136–145)
SODIUM SERPL-SCNC: 139 MMOL/L (ref 136–145)
WBC # BLD AUTO: 3.83 K/UL (ref 3.9–12.7)

## 2023-10-14 PROCEDURE — 99900035 HC TECH TIME PER 15 MIN (STAT)

## 2023-10-14 PROCEDURE — 25000003 PHARM REV CODE 250: Performed by: INTERNAL MEDICINE

## 2023-10-14 PROCEDURE — 84132 ASSAY OF SERUM POTASSIUM: CPT

## 2023-10-14 PROCEDURE — 25000003 PHARM REV CODE 250: Performed by: PHYSICIAN ASSISTANT

## 2023-10-14 PROCEDURE — 20600001 HC STEP DOWN PRIVATE ROOM

## 2023-10-14 PROCEDURE — 99233 PR SUBSEQUENT HOSPITAL CARE,LEVL III: ICD-10-PCS | Mod: ,,, | Performed by: PHYSICIAN ASSISTANT

## 2023-10-14 PROCEDURE — 36415 COLL VENOUS BLD VENIPUNCTURE: CPT | Performed by: STUDENT IN AN ORGANIZED HEALTH CARE EDUCATION/TRAINING PROGRAM

## 2023-10-14 PROCEDURE — 99233 SBSQ HOSP IP/OBS HIGH 50: CPT | Mod: ,,, | Performed by: PHYSICIAN ASSISTANT

## 2023-10-14 PROCEDURE — 85025 COMPLETE CBC W/AUTO DIFF WBC: CPT | Performed by: STUDENT IN AN ORGANIZED HEALTH CARE EDUCATION/TRAINING PROGRAM

## 2023-10-14 PROCEDURE — 85730 THROMBOPLASTIN TIME PARTIAL: CPT | Performed by: STUDENT IN AN ORGANIZED HEALTH CARE EDUCATION/TRAINING PROGRAM

## 2023-10-14 PROCEDURE — 83735 ASSAY OF MAGNESIUM: CPT | Performed by: STUDENT IN AN ORGANIZED HEALTH CARE EDUCATION/TRAINING PROGRAM

## 2023-10-14 PROCEDURE — 85610 PROTHROMBIN TIME: CPT | Performed by: STUDENT IN AN ORGANIZED HEALTH CARE EDUCATION/TRAINING PROGRAM

## 2023-10-14 PROCEDURE — A4216 STERILE WATER/SALINE, 10 ML: HCPCS | Performed by: INTERNAL MEDICINE

## 2023-10-14 PROCEDURE — 25000003 PHARM REV CODE 250: Performed by: STUDENT IN AN ORGANIZED HEALTH CARE EDUCATION/TRAINING PROGRAM

## 2023-10-14 PROCEDURE — 82800 BLOOD PH: CPT

## 2023-10-14 PROCEDURE — 84295 ASSAY OF SERUM SODIUM: CPT

## 2023-10-14 PROCEDURE — 82330 ASSAY OF CALCIUM: CPT

## 2023-10-14 PROCEDURE — 84100 ASSAY OF PHOSPHORUS: CPT | Performed by: STUDENT IN AN ORGANIZED HEALTH CARE EDUCATION/TRAINING PROGRAM

## 2023-10-14 PROCEDURE — 80048 BASIC METABOLIC PNL TOTAL CA: CPT | Performed by: STUDENT IN AN ORGANIZED HEALTH CARE EDUCATION/TRAINING PROGRAM

## 2023-10-14 PROCEDURE — 82803 BLOOD GASES ANY COMBINATION: CPT

## 2023-10-14 PROCEDURE — 63600175 PHARM REV CODE 636 W HCPCS: Performed by: STUDENT IN AN ORGANIZED HEALTH CARE EDUCATION/TRAINING PROGRAM

## 2023-10-14 RX ORDER — POTASSIUM CHLORIDE 20 MEQ/1
40 TABLET, EXTENDED RELEASE ORAL ONCE
Status: COMPLETED | OUTPATIENT
Start: 2023-10-14 | End: 2023-10-14

## 2023-10-14 RX ORDER — FUROSEMIDE 40 MG/1
40 TABLET ORAL 2 TIMES DAILY
Status: DISCONTINUED | OUTPATIENT
Start: 2023-10-14 | End: 2023-10-15

## 2023-10-14 RX ADMIN — CLOPIDOGREL BISULFATE 75 MG: 75 TABLET ORAL at 08:10

## 2023-10-14 RX ADMIN — POTASSIUM CHLORIDE 40 MEQ: 1500 TABLET, EXTENDED RELEASE ORAL at 11:10

## 2023-10-14 RX ADMIN — FUROSEMIDE 40 MG: 40 TABLET ORAL at 05:10

## 2023-10-14 RX ADMIN — Medication 10 ML: at 05:10

## 2023-10-14 RX ADMIN — AMIODARONE HYDROCHLORIDE 400 MG: 200 TABLET ORAL at 08:10

## 2023-10-14 RX ADMIN — Medication 10 ML: at 11:10

## 2023-10-14 RX ADMIN — Medication 10 ML: at 06:10

## 2023-10-14 RX ADMIN — ASPIRIN 81 MG CHEWABLE TABLET 81 MG: 81 TABLET CHEWABLE at 08:10

## 2023-10-14 RX ADMIN — ATORVASTATIN CALCIUM 80 MG: 40 TABLET, FILM COATED ORAL at 08:10

## 2023-10-14 RX ADMIN — DOBUTAMINE HYDROCHLORIDE 5 MCG/KG/MIN: 400 INJECTION INTRAVENOUS at 01:10

## 2023-10-14 NOTE — TELEPHONE ENCOUNTER
Transplant SW On Call Note:    Transplant SW received a call from patients bedside nurse Herlinda this morning, that pts wife has some questions for the .  Transplant SW spoke with pts wife Ms. Abbott via phone. Ms. Abbott was inquiring if this SW would be able to assist her with applying for Medicare for patient, as he is only insured by Medicaid at this time.  SW asked some questions from patient and patients wife about his length of disability, reasons and disability income.  From both patient and patients wife answers, it sounds like patient receives SSI benefits and my not have paid all his quarters into SS and Medicare prior to becoming disabled.   Transplant SW explained that while we cannot assist patient in application for Medicare, he and his wife should call the SS Administration to inquire about applying for Medicare and if patient would qualify at this time.   Patients wife also asking this SW information about Medicaid paying for a continuous glucose monitoring system.  This SW does not have that answer, but encouraged pts wife to speak with his insulin prescribing provider's office and/or the pharmacy where he gets his testing supplies.  Patient has a dr in [place] that prescribes these items.   Transplant SW On Call did provide this information to patients following HTS SW via [employer].   Transplant SW remains available.

## 2023-10-14 NOTE — PROGRESS NOTES
Discharge Planning    LCSW got message DENIZ Ahn saying Pt could discharge today if  was set up. LCSW contact [employer] and spoke to Bessie who states insurance is still pending and education has not been complete. LCSW informed DENIZ Ahn.

## 2023-10-14 NOTE — SUBJECTIVE & OBJECTIVE
Interval History: No acute events overnight. He feels well this AM. Tolerating  5 well. Kidney function improving. Bioscript still waiting insurance authorization so will not be able to discharge today.     Continuous Infusions:   DOBUTamine IV infusion (non-titrating) 5 mcg/kg/min (10/12/23 2110)     Scheduled Meds:   amiodarone  400 mg Oral BID    aspirin  81 mg Oral Daily    atorvastatin  80 mg Oral Daily    clopidogreL  75 mg Oral Daily    sodium chloride 0.9%  10 mL Intravenous Q6H     PRN Meds:dextrose 10%, dextrose 10%, glucagon (human recombinant), insulin aspart U-100, LIDOcaine HCL 20 mg/ml (2%), Flushing PICC/Midline Protocol **AND** sodium chloride 0.9% **AND** sodium chloride 0.9%    Review of patient's allergies indicates:  No Known Allergies  Objective:     Vital Signs (Most Recent):  Temp: 98.2 °F (36.8 °C) (10/14/23 1128)  Pulse: 85 (10/14/23 1128)  Resp: 18 (10/14/23 1128)  BP: 117/71 (10/14/23 1128)  SpO2: 98 % (10/14/23 1128) Vital Signs (24h Range):  Temp:  [97.7 °F (36.5 °C)-98.2 °F (36.8 °C)] 98.2 °F (36.8 °C)  Pulse:  [73-94] 85  Resp:  [17-20] 18  SpO2:  [97 %-100 %] 98 %  BP: (107-145)/(56-79) 117/71     Patient Vitals for the past 72 hrs (Last 3 readings):   Weight   10/13/23 0500 77 kg (169 lb 12.1 oz)   10/12/23 1951 77 kg (169 lb 12.1 oz)       Body mass index is 23.02 kg/m².      Intake/Output Summary (Last 24 hours) at 10/14/2023 1307  Last data filed at 10/13/2023 1850  Gross per 24 hour   Intake 222 ml   Output 400 ml   Net -178 ml              Physical Exam  Constitutional:       Appearance: Normal appearance.   HENT:      Head: Normocephalic and atraumatic.   Eyes:      Pupils: Pupils are equal, round, and reactive to light.   Neck:      Vascular: No JVD.      Comments: JVP does not appear elevated.   Cardiovascular:      Rate and Rhythm: Normal rate and regular rhythm.      Pulses: Normal pulses.      Heart sounds: Normal heart sounds.   Pulmonary:      Effort: Pulmonary  "effort is normal.      Breath sounds: Normal breath sounds.   Abdominal:      General: Bowel sounds are normal. There is no distension.      Palpations: Abdomen is soft.      Tenderness: There is no abdominal tenderness.   Musculoskeletal:         General: Normal range of motion.      Cervical back: Normal range of motion and neck supple.      Right lower leg: No edema.      Left lower leg: No edema.   Skin:     General: Skin is warm and dry.      Capillary Refill: Capillary refill takes less than 2 seconds.   Neurological:      General: No focal deficit present.      Mental Status: He is alert and oriented to person, place, and time.   Psychiatric:         Mood and Affect: Mood normal.         Behavior: Behavior normal.            Significant Labs:  CBC:  Recent Labs   Lab 10/13/23  0321 10/13/23  0609 10/14/23  0326 10/14/23  1039   WBC 3.29* 3.65* 3.83*  --    RBC 4.16* 4.20* 4.10*  --    HGB 12.5* 12.8* 12.6*  --    HCT 36.6* 37.8* 36.3* 36    191 199  --    MCV 88 90 89  --    MCH 30.0 30.5 30.7  --    MCHC 34.2 33.9 34.7  --        BNP:  Recent Labs   Lab 10/10/23  0627   *       CMP:  Recent Labs   Lab 10/12/23  1956 10/13/23  0321 10/14/23  0326   * 128* 114*   CALCIUM 10.2 9.4 9.3    138 139   K 4.4 3.5 3.8   CO2 25 23 24    103 101   BUN 43* 41* 37*   CREATININE 2.8* 2.4* 2.0*        Coagulation:   Recent Labs   Lab 10/13/23  0321 10/13/23  0609 10/14/23  0326   INR 1.0 1.0 1.0   APTT 38.9* 38.4* 27.8       LDH:  No results for input(s): "LDH" in the last 72 hours.  Microbiology:  Microbiology Results (last 7 days)       ** No results found for the last 168 hours. **            I have reviewed all pertinent labs within the past 24 hours.    Estimated Creatinine Clearance: 42.2 mL/min (A) (based on SCr of 2 mg/dL (H)).    Diagnostic Results:  I have reviewed all pertinent imaging results/findings within the past 24 hours.  "

## 2023-10-14 NOTE — ASSESSMENT & PLAN NOTE
Ischemic Cardiomyopathy  Euvolemic on exam  Home GDMT w/ Entresto, Isordil, Aldactone, Jardiance, Coreg (currently on hold).  RHC findings on 10/13 on  5 as follows: RA: 2 RV: 43/2 PA: 35/16, mean 23 PCWP: 12    Will restart home dose diuretics and monitor response.   Will get PICC line for home inotropes. Will plan for D/C once HH therapies are set up.   Unfortunately out of network for advanced options and will need to be evaluated and treated surgically at outside facility. Currently d/c pending auth of home .

## 2023-10-14 NOTE — PROGRESS NOTES
[facility] - Cardiology Stepdown  Heart Transplant  Progress Note    Patient Name: Radha Abbott  MRN: [id number]  Admission Date: 10/12/2023  Hospital Length of Stay: 2 days  Attending Physician: Josie Downing DO  Primary Care Provider: Vasu Kong MD  Principal Problem:HFrEF (heart failure with reduced ejection fraction)    Subjective:     Interval History: No acute events overnight. He feels well this AM. Tolerating  5 well. Kidney function improving. Bioscript still waiting insurance authorization so will not be able to discharge today.     Continuous Infusions:   DOBUTamine IV infusion (non-titrating) 5 mcg/kg/min (10/12/23 2110)     Scheduled Meds:   amiodarone  400 mg Oral BID    aspirin  81 mg Oral Daily    atorvastatin  80 mg Oral Daily    clopidogreL  75 mg Oral Daily    sodium chloride 0.9%  10 mL Intravenous Q6H     PRN Meds:dextrose 10%, dextrose 10%, glucagon (human recombinant), insulin aspart U-100, LIDOcaine HCL 20 mg/ml (2%), Flushing PICC/Midline Protocol **AND** sodium chloride 0.9% **AND** sodium chloride 0.9%    Review of patient's allergies indicates:  No Known Allergies  Objective:     Vital Signs (Most Recent):  Temp: 98.2 °F (36.8 °C) (10/14/23 1128)  Pulse: 85 (10/14/23 1128)  Resp: 18 (10/14/23 1128)  BP: 117/71 (10/14/23 1128)  SpO2: 98 % (10/14/23 1128) Vital Signs (24h Range):  Temp:  [97.7 °F (36.5 °C)-98.2 °F (36.8 °C)] 98.2 °F (36.8 °C)  Pulse:  [73-94] 85  Resp:  [17-20] 18  SpO2:  [97 %-100 %] 98 %  BP: (107-145)/(56-79) 117/71     Patient Vitals for the past 72 hrs (Last 3 readings):   Weight   10/13/23 0500 77 kg (169 lb 12.1 oz)   10/12/23 1951 77 kg (169 lb 12.1 oz)       Body mass index is 23.02 kg/m².      Intake/Output Summary (Last 24 hours) at 10/14/2023 1307  Last data filed at 10/13/2023 1850  Gross per 24 hour   Intake 222 ml   Output 400 ml   Net -178 ml              Physical Exam  Constitutional:       Appearance: Normal appearance.   HENT:       "Head: Normocephalic and atraumatic.   Eyes:      Pupils: Pupils are equal, round, and reactive to light.   Neck:      Vascular: No JVD.      Comments: JVP does not appear elevated.   Cardiovascular:      Rate and Rhythm: Normal rate and regular rhythm.      Pulses: Normal pulses.      Heart sounds: Normal heart sounds.   Pulmonary:      Effort: Pulmonary effort is normal.      Breath sounds: Normal breath sounds.   Abdominal:      General: Bowel sounds are normal. There is no distension.      Palpations: Abdomen is soft.      Tenderness: There is no abdominal tenderness.   Musculoskeletal:         General: Normal range of motion.      Cervical back: Normal range of motion and neck supple.      Right lower leg: No edema.      Left lower leg: No edema.   Skin:     General: Skin is warm and dry.      Capillary Refill: Capillary refill takes less than 2 seconds.   Neurological:      General: No focal deficit present.      Mental Status: He is alert and oriented to person, place, and time.   Psychiatric:         Mood and Affect: Mood normal.         Behavior: Behavior normal.            Significant Labs:  CBC:  Recent Labs   Lab 10/13/23  0321 10/13/23  0609 10/14/23  0326 10/14/23  1039   WBC 3.29* 3.65* 3.83*  --    RBC 4.16* 4.20* 4.10*  --    HGB 12.5* 12.8* 12.6*  --    HCT 36.6* 37.8* 36.3* 36    191 199  --    MCV 88 90 89  --    MCH 30.0 30.5 30.7  --    MCHC 34.2 33.9 34.7  --        BNP:  Recent Labs   Lab 10/10/23  0627   *       CMP:  Recent Labs   Lab 10/12/23  1956 10/13/23  0321 10/14/23  0326   * 128* 114*   CALCIUM 10.2 9.4 9.3    138 139   K 4.4 3.5 3.8   CO2 25 23 24    103 101   BUN 43* 41* 37*   CREATININE 2.8* 2.4* 2.0*        Coagulation:   Recent Labs   Lab 10/13/23  0321 10/13/23  0609 10/14/23  0326   INR 1.0 1.0 1.0   APTT 38.9* 38.4* 27.8       LDH:  No results for input(s): "LDH" in the last 72 hours.  Microbiology:  Microbiology Results (last 7 days)       ** " No results found for the last 168 hours. **            I have reviewed all pertinent labs within the past 24 hours.    Estimated Creatinine Clearance: 42.2 mL/min (A) (based on SCr of 2 mg/dL (H)).    Diagnostic Results:  I have reviewed all pertinent imaging results/findings within the past 24 hours.    Assessment and Plan:     No notes on file    * HFrEF (heart failure with reduced ejection fraction)  Ischemic Cardiomyopathy  Euvolemic on exam  Home GDMT w/ Entresto, Isordil, Aldactone, Jardiance, Coreg (currently on hold).  RHC findings on 10/13 on  5 as follows: RA: 2 RV: 43/2 PA: 35/16, mean 23 PCWP: 12    Will restart home dose diuretics and monitor response.   Will get PICC line for home inotropes. Will plan for D/C once HH therapies are set up.   Unfortunately out of network for advanced options and will need to be evaluated and treated surgically at outside facility. Currently d/c pending auth of home .     IVÁN (acute kidney injury)  Normal kidney function in march 2022. Unclear if IVÁN due to cardiorenal or new baseline, but improving with addition of inotrope. Hold diuresis and nephrotoxic medications.     DM (diabetes mellitus)  On PO glycemic control at home. Will cover with SSI while admitted.     CAD (coronary artery disease)  Continue DAPT    Ventricular tachycardia  S/p 48 hour IV amio load at OSH. Transition to 400 PO mg amio BID.         Jimy An PA-C  Heart Transplant  [facility] - Cardiology Stepdown

## 2023-10-15 PROBLEM — I48.0 PAROXYSMAL ATRIAL FIBRILLATION: Status: ACTIVE | Noted: 2023-10-15

## 2023-10-15 LAB
ANION GAP SERPL CALC-SCNC: 12 MMOL/L (ref 8–16)
APTT PPP: 27.8 SEC (ref 21–32)
BASOPHILS # BLD AUTO: 0.04 K/UL (ref 0–0.2)
BASOPHILS NFR BLD: 1.1 % (ref 0–1.9)
BUN SERPL-MCNC: 33 MG/DL (ref 8–23)
CALCIUM SERPL-MCNC: 9.1 MG/DL (ref 8.7–10.5)
CHLORIDE SERPL-SCNC: 103 MMOL/L (ref 95–110)
CO2 SERPL-SCNC: 22 MMOL/L (ref 23–29)
CREAT SERPL-MCNC: 2.1 MG/DL (ref 0.5–1.4)
DIFFERENTIAL METHOD: ABNORMAL
EOSINOPHIL # BLD AUTO: 0.1 K/UL (ref 0–0.5)
EOSINOPHIL NFR BLD: 1.6 % (ref 0–8)
ERYTHROCYTE [DISTWIDTH] IN BLOOD BY AUTOMATED COUNT: 13.6 % (ref 11.5–14.5)
EST. GFR  (NO RACE VARIABLE): 35.2 ML/MIN/1.73 M^2
FERRITIN SERPL-MCNC: 160 NG/ML (ref 20–300)
GLUCOSE SERPL-MCNC: 155 MG/DL (ref 70–110)
HCT VFR BLD AUTO: 35.8 % (ref 40–54)
HGB BLD-MCNC: 12.2 G/DL (ref 14–18)
IMM GRANULOCYTES # BLD AUTO: 0.01 K/UL (ref 0–0.04)
IMM GRANULOCYTES NFR BLD AUTO: 0.3 % (ref 0–0.5)
INR PPP: 1 (ref 0.8–1.2)
IRON SERPL-MCNC: 66 UG/DL (ref 45–160)
LYMPHOCYTES # BLD AUTO: 1.5 K/UL (ref 1–4.8)
LYMPHOCYTES NFR BLD: 39 % (ref 18–48)
MAGNESIUM SERPL-MCNC: 2.2 MG/DL (ref 1.6–2.6)
MCH RBC QN AUTO: 30.7 PG (ref 27–31)
MCHC RBC AUTO-ENTMCNC: 34.1 G/DL (ref 32–36)
MCV RBC AUTO: 90 FL (ref 82–98)
MONOCYTES # BLD AUTO: 0.5 K/UL (ref 0.3–1)
MONOCYTES NFR BLD: 12.5 % (ref 4–15)
NEUTROPHILS # BLD AUTO: 1.7 K/UL (ref 1.8–7.7)
NEUTROPHILS NFR BLD: 45.5 % (ref 38–73)
NRBC BLD-RTO: 0 /100 WBC
PHOSPHATE SERPL-MCNC: 3.8 MG/DL (ref 2.7–4.5)
PLATELET # BLD AUTO: 191 K/UL (ref 150–450)
PMV BLD AUTO: 10.5 FL (ref 9.2–12.9)
POCT GLUCOSE: 190 MG/DL (ref 70–110)
POCT GLUCOSE: 223 MG/DL (ref 70–110)
POCT GLUCOSE: 250 MG/DL (ref 70–110)
POTASSIUM SERPL-SCNC: 4.2 MMOL/L (ref 3.5–5.1)
PROTHROMBIN TIME: 10.8 SEC (ref 9–12.5)
RBC # BLD AUTO: 3.98 M/UL (ref 4.6–6.2)
SATURATED IRON: 18 % (ref 20–50)
SODIUM SERPL-SCNC: 137 MMOL/L (ref 136–145)
TOTAL IRON BINDING CAPACITY: 366 UG/DL (ref 250–450)
TRANSFERRIN SERPL-MCNC: 247 MG/DL (ref 200–375)
WBC # BLD AUTO: 3.77 K/UL (ref 3.9–12.7)

## 2023-10-15 PROCEDURE — 25000003 PHARM REV CODE 250: Performed by: INTERNAL MEDICINE

## 2023-10-15 PROCEDURE — 85730 THROMBOPLASTIN TIME PARTIAL: CPT | Performed by: INTERNAL MEDICINE

## 2023-10-15 PROCEDURE — 99233 SBSQ HOSP IP/OBS HIGH 50: CPT | Mod: ,,, | Performed by: PHYSICIAN ASSISTANT

## 2023-10-15 PROCEDURE — A4216 STERILE WATER/SALINE, 10 ML: HCPCS | Performed by: INTERNAL MEDICINE

## 2023-10-15 PROCEDURE — 84466 ASSAY OF TRANSFERRIN: CPT | Performed by: PHYSICIAN ASSISTANT

## 2023-10-15 PROCEDURE — 82728 ASSAY OF FERRITIN: CPT | Performed by: PHYSICIAN ASSISTANT

## 2023-10-15 PROCEDURE — C1751 CATH, INF, PER/CENT/MIDLINE: HCPCS

## 2023-10-15 PROCEDURE — 25000003 PHARM REV CODE 250: Performed by: STUDENT IN AN ORGANIZED HEALTH CARE EDUCATION/TRAINING PROGRAM

## 2023-10-15 PROCEDURE — 83540 ASSAY OF IRON: CPT | Performed by: PHYSICIAN ASSISTANT

## 2023-10-15 PROCEDURE — 25000003 PHARM REV CODE 250: Performed by: PHYSICIAN ASSISTANT

## 2023-10-15 PROCEDURE — 83735 ASSAY OF MAGNESIUM: CPT | Performed by: INTERNAL MEDICINE

## 2023-10-15 PROCEDURE — 85025 COMPLETE CBC W/AUTO DIFF WBC: CPT | Performed by: INTERNAL MEDICINE

## 2023-10-15 PROCEDURE — 20600001 HC STEP DOWN PRIVATE ROOM

## 2023-10-15 PROCEDURE — 84100 ASSAY OF PHOSPHORUS: CPT | Performed by: INTERNAL MEDICINE

## 2023-10-15 PROCEDURE — 85610 PROTHROMBIN TIME: CPT | Performed by: INTERNAL MEDICINE

## 2023-10-15 PROCEDURE — 80048 BASIC METABOLIC PNL TOTAL CA: CPT | Performed by: INTERNAL MEDICINE

## 2023-10-15 PROCEDURE — 99233 PR SUBSEQUENT HOSPITAL CARE,LEVL III: ICD-10-PCS | Mod: ,,, | Performed by: PHYSICIAN ASSISTANT

## 2023-10-15 RX ADMIN — APIXABAN 5 MG: 5 TABLET, FILM COATED ORAL at 09:10

## 2023-10-15 RX ADMIN — ASPIRIN 81 MG CHEWABLE TABLET 81 MG: 81 TABLET CHEWABLE at 08:10

## 2023-10-15 RX ADMIN — Medication 10 ML: at 11:10

## 2023-10-15 RX ADMIN — Medication 10 ML: at 05:10

## 2023-10-15 RX ADMIN — ATORVASTATIN CALCIUM 80 MG: 40 TABLET, FILM COATED ORAL at 08:10

## 2023-10-15 RX ADMIN — FUROSEMIDE 40 MG: 40 TABLET ORAL at 08:10

## 2023-10-15 RX ADMIN — AMIODARONE HYDROCHLORIDE 400 MG: 200 TABLET ORAL at 09:10

## 2023-10-15 RX ADMIN — AMIODARONE HYDROCHLORIDE 400 MG: 200 TABLET ORAL at 08:10

## 2023-10-15 RX ADMIN — INSULIN ASPART 2 UNITS: 100 INJECTION, SOLUTION INTRAVENOUS; SUBCUTANEOUS at 12:10

## 2023-10-15 RX ADMIN — CLOPIDOGREL BISULFATE 75 MG: 75 TABLET ORAL at 08:10

## 2023-10-15 NOTE — SUBJECTIVE & OBJECTIVE
Interval History: No acute events overnight. He feels well this AM. Tolerating  5 well. Kidney function essentially unchanged. Bioscript still waiting insurance authorization so likely d/c tomorrow on  5. CVP 3 and appears dry so holding lasix for now.     Continuous Infusions:   DOBUTamine IV infusion (non-titrating) 5 mcg/kg/min (10/14/23 1324)     Scheduled Meds:   amiodarone  400 mg Oral BID    aspirin  81 mg Oral Daily    atorvastatin  80 mg Oral Daily    clopidogreL  75 mg Oral Daily    sodium chloride 0.9%  10 mL Intravenous Q6H     PRN Meds:dextrose 10%, dextrose 10%, glucagon (human recombinant), insulin aspart U-100, LIDOcaine HCL 20 mg/ml (2%), Flushing PICC/Midline Protocol **AND** sodium chloride 0.9% **AND** sodium chloride 0.9%    Review of patient's allergies indicates:  No Known Allergies  Objective:     Vital Signs (Most Recent):  Temp: 98.3 °F (36.8 °C) (10/15/23 1130)  Pulse: 84 (10/15/23 1200)  Resp: 18 (10/15/23 1130)  BP: (!) 105/56 (10/15/23 1130)  SpO2: 97 % (10/15/23 1130) Vital Signs (24h Range):  Temp:  [98.2 °F (36.8 °C)-98.8 °F (37.1 °C)] 98.3 °F (36.8 °C)  Pulse:  [] 84  Resp:  [14-20] 18  SpO2:  [97 %-99 %] 97 %  BP: (105-123)/(56-78) 105/56     Patient Vitals for the past 72 hrs (Last 3 readings):   Weight   10/15/23 0343 77.6 kg (171 lb 1.2 oz)   10/13/23 0500 77 kg (169 lb 12.1 oz)   10/12/23 1951 77 kg (169 lb 12.1 oz)       Body mass index is 23.2 kg/m².    No intake or output data in the 24 hours ending 10/15/23 1302           Physical Exam  Constitutional:       Appearance: Normal appearance.   HENT:      Head: Normocephalic and atraumatic.   Eyes:      Pupils: Pupils are equal, round, and reactive to light.   Neck:      Vascular: No JVD.      Comments: JVP does not appear elevated.   Cardiovascular:      Rate and Rhythm: Normal rate and regular rhythm.      Pulses: Normal pulses.      Heart sounds: Normal heart sounds.   Pulmonary:      Effort: Pulmonary effort is  "normal.      Breath sounds: Normal breath sounds.   Abdominal:      General: Bowel sounds are normal. There is no distension.      Palpations: Abdomen is soft.      Tenderness: There is no abdominal tenderness.   Musculoskeletal:         General: Normal range of motion.      Cervical back: Normal range of motion and neck supple.      Right lower leg: No edema.      Left lower leg: No edema.   Skin:     General: Skin is warm and dry.      Capillary Refill: Capillary refill takes less than 2 seconds.   Neurological:      General: No focal deficit present.      Mental Status: He is alert and oriented to person, place, and time.   Psychiatric:         Mood and Affect: Mood normal.         Behavior: Behavior normal.            Significant Labs:  CBC:  Recent Labs   Lab 10/13/23  0609 10/14/23  0326 10/14/23  1039 10/15/23  0431   WBC 3.65* 3.83*  --  3.77*   RBC 4.20* 4.10*  --  3.98*   HGB 12.8* 12.6*  --  12.2*   HCT 37.8* 36.3* 36 35.8*    199  --  191   MCV 90 89  --  90   MCH 30.5 30.7  --  30.7   MCHC 33.9 34.7  --  34.1       BNP:  Recent Labs   Lab 10/10/23  0627   *       CMP:  Recent Labs   Lab 10/13/23  0321 10/14/23  0326 10/15/23  0431   * 114* 155*   CALCIUM 9.4 9.3 9.1    139 137   K 3.5 3.8 4.2   CO2 23 24 22*    101 103   BUN 41* 37* 33*   CREATININE 2.4* 2.0* 2.1*        Coagulation:   Recent Labs   Lab 10/13/23  0609 10/14/23  0326 10/15/23  0431   INR 1.0 1.0 1.0   APTT 38.4* 27.8 27.8       LDH:  No results for input(s): "LDH" in the last 72 hours.  Microbiology:  Microbiology Results (last 7 days)       ** No results found for the last 168 hours. **            I have reviewed all pertinent labs within the past 24 hours.    Estimated Creatinine Clearance: 40.5 mL/min (A) (based on SCr of 2.1 mg/dL (H)).    Diagnostic Results:  I have reviewed all pertinent imaging results/findings within the past 24 hours.  "

## 2023-10-15 NOTE — PROGRESS NOTES
[facility] - Cardiology Stepdown  Heart Transplant  Progress Note    Patient Name: Radha Abbott  MRN: [id number]  Admission Date: 10/12/2023  Hospital Length of Stay: 3 days  Attending Physician: Josie Downing DO  Primary Care Provider: Vasu Kong MD  Principal Problem:HFrEF (heart failure with reduced ejection fraction)    Subjective:     Interval History: No acute events overnight. He feels well this AM. Tolerating  5 well. Kidney function essentially unchanged. Bioscript still waiting insurance authorization so likely d/c tomorrow on  5. CVP 3 and appears dry so holding lasix for now.     Continuous Infusions:   DOBUTamine IV infusion (non-titrating) 5 mcg/kg/min (10/14/23 1324)     Scheduled Meds:   amiodarone  400 mg Oral BID    aspirin  81 mg Oral Daily    atorvastatin  80 mg Oral Daily    clopidogreL  75 mg Oral Daily    sodium chloride 0.9%  10 mL Intravenous Q6H     PRN Meds:dextrose 10%, dextrose 10%, glucagon (human recombinant), insulin aspart U-100, LIDOcaine HCL 20 mg/ml (2%), Flushing PICC/Midline Protocol **AND** sodium chloride 0.9% **AND** sodium chloride 0.9%    Review of patient's allergies indicates:  No Known Allergies  Objective:     Vital Signs (Most Recent):  Temp: 98.3 °F (36.8 °C) (10/15/23 1130)  Pulse: 84 (10/15/23 1200)  Resp: 18 (10/15/23 1130)  BP: (!) 105/56 (10/15/23 1130)  SpO2: 97 % (10/15/23 1130) Vital Signs (24h Range):  Temp:  [98.2 °F (36.8 °C)-98.8 °F (37.1 °C)] 98.3 °F (36.8 °C)  Pulse:  [] 84  Resp:  [14-20] 18  SpO2:  [97 %-99 %] 97 %  BP: (105-123)/(56-78) 105/56     Patient Vitals for the past 72 hrs (Last 3 readings):   Weight   10/15/23 0343 77.6 kg (171 lb 1.2 oz)   10/13/23 0500 77 kg (169 lb 12.1 oz)   10/12/23 1951 77 kg (169 lb 12.1 oz)       Body mass index is 23.2 kg/m².    No intake or output data in the 24 hours ending 10/15/23 1302           Physical Exam  Constitutional:       Appearance: Normal appearance.   HENT:      Head:  "Normocephalic and atraumatic.   Eyes:      Pupils: Pupils are equal, round, and reactive to light.   Neck:      Vascular: No JVD.      Comments: JVP does not appear elevated.   Cardiovascular:      Rate and Rhythm: Normal rate and regular rhythm.      Pulses: Normal pulses.      Heart sounds: Normal heart sounds.   Pulmonary:      Effort: Pulmonary effort is normal.      Breath sounds: Normal breath sounds.   Abdominal:      General: Bowel sounds are normal. There is no distension.      Palpations: Abdomen is soft.      Tenderness: There is no abdominal tenderness.   Musculoskeletal:         General: Normal range of motion.      Cervical back: Normal range of motion and neck supple.      Right lower leg: No edema.      Left lower leg: No edema.   Skin:     General: Skin is warm and dry.      Capillary Refill: Capillary refill takes less than 2 seconds.   Neurological:      General: No focal deficit present.      Mental Status: He is alert and oriented to person, place, and time.   Psychiatric:         Mood and Affect: Mood normal.         Behavior: Behavior normal.            Significant Labs:  CBC:  Recent Labs   Lab 10/13/23  0609 10/14/23  0326 10/14/23  1039 10/15/23  0431   WBC 3.65* 3.83*  --  3.77*   RBC 4.20* 4.10*  --  3.98*   HGB 12.8* 12.6*  --  12.2*   HCT 37.8* 36.3* 36 35.8*    199  --  191   MCV 90 89  --  90   MCH 30.5 30.7  --  30.7   MCHC 33.9 34.7  --  34.1       BNP:  Recent Labs   Lab 10/10/23  0627   *       CMP:  Recent Labs   Lab 10/13/23  0321 10/14/23  0326 10/15/23  0431   * 114* 155*   CALCIUM 9.4 9.3 9.1    139 137   K 3.5 3.8 4.2   CO2 23 24 22*    101 103   BUN 41* 37* 33*   CREATININE 2.4* 2.0* 2.1*        Coagulation:   Recent Labs   Lab 10/13/23  0609 10/14/23  0326 10/15/23  0431   INR 1.0 1.0 1.0   APTT 38.4* 27.8 27.8       LDH:  No results for input(s): "LDH" in the last 72 hours.  Microbiology:  Microbiology Results (last 7 days)       ** No " results found for the last 168 hours. **            I have reviewed all pertinent labs within the past 24 hours.    Estimated Creatinine Clearance: 40.5 mL/min (A) (based on SCr of 2.1 mg/dL (H)).    Diagnostic Results:  I have reviewed all pertinent imaging results/findings within the past 24 hours.    Assessment and Plan:     No notes on file    * HFrEF (heart failure with reduced ejection fraction)  Ischemic Cardiomyopathy  Euvolemic on exam. Possibly dry w/ CVP 3 and uptrend in sCr with resumption of lasix. Will hold his lasix and monitor.   Home GDMT w/ Entresto, Isordil, Aldactone, Jardiance, Coreg (currently on hold).  RHC findings on 10/13 on  5 as follows: RA: 2 RV: 43/2 PA: 35/16, mean 23 PCWP: 12    Will restart home dose diuretics and monitor response.   Will get PICC line for home inotropes. Will plan for D/C once HH therapies are set up.   Unfortunately out of network for advanced options and will need to be evaluated and treated surgically at outside facility. Currently d/c pending auth of home .     Paroxysmal atrial fibrillation  New Dx of pAF at OSH. Was transferred over on heparin gtt. Has been in NSR with PVCs since admission but will interrogate device and start NOAC.     IVÁN (acute kidney injury)  Normal kidney function in march 2022. Unclear if IVÁN due to cardiorenal or new baseline, but improving with addition of inotrope. Hold diuresis and nephrotoxic medications.     DM (diabetes mellitus)  On PO glycemic control at home. Will cover with SSI while admitted.     CAD (coronary artery disease)  Continue DAPT    Ventricular tachycardia  S/p 48 hour IV amio load at OSH. Transition to 400 PO mg amio BID.         Jimy An PA-C  Heart Transplant  [facility] - Cardiology Stepdown

## 2023-10-15 NOTE — ASSESSMENT & PLAN NOTE
New Dx of pAF at OSH. Was transferred over on heparin gtt. Has been in NSR with PVCs since admission but will interrogate device and start NOAC.

## 2023-10-15 NOTE — ASSESSMENT & PLAN NOTE
Ischemic Cardiomyopathy  Euvolemic on exam. Possibly dry w/ CVP 3 and uptrend in sCr with resumption of lasix. Will hold his lasix and monitor.   Home GDMT w/ Entresto, Isordil, Aldactone, Jardiance, Coreg (currently on hold).  RHC findings on 10/13 on  5 as follows: RA: 2 RV: 43/2 PA: 35/16, mean 23 PCWP: 12    Will restart home dose diuretics and monitor response.   Will get PICC line for home inotropes. Will plan for D/C once HH therapies are set up.   Unfortunately out of network for advanced options and will need to be evaluated and treated surgically at outside facility. Currently d/c pending auth of home .

## 2023-10-16 ENCOUNTER — DOCUMENTATION ONLY (OUTPATIENT)
Dept: ELECTROPHYSIOLOGY | Facility: CLINIC | Age: 63
End: 2023-10-16
Payer: MEDICAID

## 2023-10-16 VITALS
WEIGHT: 170.88 LBS | HEART RATE: 87 BPM | TEMPERATURE: 99 F | DIASTOLIC BLOOD PRESSURE: 68 MMHG | RESPIRATION RATE: 18 BRPM | BODY MASS INDEX: 23.14 KG/M2 | OXYGEN SATURATION: 98 % | HEIGHT: 72 IN | SYSTOLIC BLOOD PRESSURE: 119 MMHG

## 2023-10-16 LAB
ANION GAP SERPL CALC-SCNC: 10 MMOL/L (ref 8–16)
APTT PPP: 30.5 SEC (ref 21–32)
BASOPHILS # BLD AUTO: 0.02 K/UL (ref 0–0.2)
BASOPHILS NFR BLD: 0.5 % (ref 0–1.9)
BUN SERPL-MCNC: 28 MG/DL (ref 8–23)
CALCIUM SERPL-MCNC: 9 MG/DL (ref 8.7–10.5)
CHLORIDE SERPL-SCNC: 106 MMOL/L (ref 95–110)
CO2 SERPL-SCNC: 21 MMOL/L (ref 23–29)
CREAT SERPL-MCNC: 1.9 MG/DL (ref 0.5–1.4)
DIFFERENTIAL METHOD: ABNORMAL
EOSINOPHIL # BLD AUTO: 0 K/UL (ref 0–0.5)
EOSINOPHIL NFR BLD: 1 % (ref 0–8)
ERYTHROCYTE [DISTWIDTH] IN BLOOD BY AUTOMATED COUNT: 13.5 % (ref 11.5–14.5)
EST. GFR  (NO RACE VARIABLE): 39.6 ML/MIN/1.73 M^2
GLUCOSE SERPL-MCNC: 161 MG/DL (ref 70–110)
HCT VFR BLD AUTO: 36.6 % (ref 40–54)
HGB BLD-MCNC: 12.4 G/DL (ref 14–18)
IMM GRANULOCYTES # BLD AUTO: 0.01 K/UL (ref 0–0.04)
IMM GRANULOCYTES NFR BLD AUTO: 0.2 % (ref 0–0.5)
INR PPP: 1 (ref 0.8–1.2)
LYMPHOCYTES # BLD AUTO: 1.3 K/UL (ref 1–4.8)
LYMPHOCYTES NFR BLD: 30.9 % (ref 18–48)
MAGNESIUM SERPL-MCNC: 2.1 MG/DL (ref 1.6–2.6)
MCH RBC QN AUTO: 30.5 PG (ref 27–31)
MCHC RBC AUTO-ENTMCNC: 33.9 G/DL (ref 32–36)
MCV RBC AUTO: 90 FL (ref 82–98)
MONOCYTES # BLD AUTO: 0.4 K/UL (ref 0.3–1)
MONOCYTES NFR BLD: 9.4 % (ref 4–15)
NEUTROPHILS # BLD AUTO: 2.4 K/UL (ref 1.8–7.7)
NEUTROPHILS NFR BLD: 58 % (ref 38–73)
NRBC BLD-RTO: 0 /100 WBC
PLATELET # BLD AUTO: 200 K/UL (ref 150–450)
PMV BLD AUTO: 11.1 FL (ref 9.2–12.9)
POCT GLUCOSE: 138 MG/DL (ref 70–110)
POCT GLUCOSE: 160 MG/DL (ref 70–110)
POCT GLUCOSE: 166 MG/DL (ref 70–110)
POTASSIUM SERPL-SCNC: 4.1 MMOL/L (ref 3.5–5.1)
PROTHROMBIN TIME: 11.1 SEC (ref 9–12.5)
RBC # BLD AUTO: 4.06 M/UL (ref 4.6–6.2)
SODIUM SERPL-SCNC: 137 MMOL/L (ref 136–145)
WBC # BLD AUTO: 4.14 K/UL (ref 3.9–12.7)

## 2023-10-16 PROCEDURE — 99233 PR SUBSEQUENT HOSPITAL CARE,LEVL III: ICD-10-PCS | Mod: ,,, | Performed by: PHYSICIAN ASSISTANT

## 2023-10-16 PROCEDURE — 83735 ASSAY OF MAGNESIUM: CPT | Performed by: PHYSICIAN ASSISTANT

## 2023-10-16 PROCEDURE — 63600175 PHARM REV CODE 636 W HCPCS: Performed by: STUDENT IN AN ORGANIZED HEALTH CARE EDUCATION/TRAINING PROGRAM

## 2023-10-16 PROCEDURE — 85730 THROMBOPLASTIN TIME PARTIAL: CPT | Performed by: INTERNAL MEDICINE

## 2023-10-16 PROCEDURE — 80048 BASIC METABOLIC PNL TOTAL CA: CPT | Performed by: PHYSICIAN ASSISTANT

## 2023-10-16 PROCEDURE — 99233 SBSQ HOSP IP/OBS HIGH 50: CPT | Mod: ,,, | Performed by: PHYSICIAN ASSISTANT

## 2023-10-16 PROCEDURE — 25000003 PHARM REV CODE 250: Performed by: PHYSICIAN ASSISTANT

## 2023-10-16 PROCEDURE — 85025 COMPLETE CBC W/AUTO DIFF WBC: CPT | Performed by: PHYSICIAN ASSISTANT

## 2023-10-16 PROCEDURE — 85610 PROTHROMBIN TIME: CPT | Performed by: INTERNAL MEDICINE

## 2023-10-16 PROCEDURE — A4216 STERILE WATER/SALINE, 10 ML: HCPCS | Performed by: INTERNAL MEDICINE

## 2023-10-16 PROCEDURE — 25000003 PHARM REV CODE 250: Performed by: INTERNAL MEDICINE

## 2023-10-16 PROCEDURE — 25000003 PHARM REV CODE 250: Performed by: STUDENT IN AN ORGANIZED HEALTH CARE EDUCATION/TRAINING PROGRAM

## 2023-10-16 RX ORDER — FUROSEMIDE 40 MG/1
40 TABLET ORAL DAILY PRN
Qty: 30 TABLET | Refills: 11 | Status: ON HOLD | OUTPATIENT
Start: 2023-10-16 | End: 2023-10-20 | Stop reason: HOSPADM

## 2023-10-16 RX ORDER — DOBUTAMINE HYDROCHLORIDE 400 MG/100ML
5 INJECTION INTRAVENOUS CONTINUOUS
Status: ON HOLD
Start: 2023-10-16 | End: 2023-10-20 | Stop reason: HOSPADM

## 2023-10-16 RX ORDER — AMIODARONE HYDROCHLORIDE 200 MG/1
TABLET ORAL
Qty: 740 TABLET | Refills: 0 | Status: ON HOLD | OUTPATIENT
Start: 2023-10-16 | End: 2023-10-20 | Stop reason: HOSPADM

## 2023-10-16 RX ADMIN — DOBUTAMINE HYDROCHLORIDE 5 MCG/KG/MIN: 400 INJECTION INTRAVENOUS at 12:10

## 2023-10-16 RX ADMIN — ATORVASTATIN CALCIUM 80 MG: 40 TABLET, FILM COATED ORAL at 09:10

## 2023-10-16 RX ADMIN — ASPIRIN 81 MG CHEWABLE TABLET 81 MG: 81 TABLET CHEWABLE at 09:10

## 2023-10-16 RX ADMIN — Medication 10 ML: at 05:10

## 2023-10-16 RX ADMIN — APIXABAN 5 MG: 5 TABLET, FILM COATED ORAL at 09:10

## 2023-10-16 RX ADMIN — CLOPIDOGREL BISULFATE 75 MG: 75 TABLET ORAL at 09:10

## 2023-10-16 RX ADMIN — Medication 10 ML: at 12:10

## 2023-10-16 RX ADMIN — AMIODARONE HYDROCHLORIDE 400 MG: 200 TABLET ORAL at 09:10

## 2023-10-16 NOTE — DISCHARGE SUMMARY
[facility] - Cardiology Stepdown  Heart Transplant  Discharge Summary      Patient Name: Radha Abbott  MRN: [id number]  Admission Date: 10/12/2023  Hospital Length of Stay: 4 days  Discharge Date and Time: 10/16/2023 4:03 PM  Attending Physician: Walter Mcintyre MD   Discharging Provider: Asim An PA-C  Primary Care Provider: Vasu Kong MD     HPI: No notes on file    Procedure(s) (LRB):  INSERTION, CATHETER, RIGHT HEART (Right)     Hospital Course: Mr abbott was transferred from  10/12. Milrinone was switched to  on arrival due to sCr of 2.8. He got RHC on 10/13 which showed RA: 2 RV: 43/2 PA: 35/16, mean 23 PCWP: 12. Determined he needs  since the RHC numbers were so much improved compared to his RHC a few days earlier at OSH off of inotropes which showed PCWP >30 and CI of 1.1. Kidney function slowly recovering with holding of diuretics. Holding all his GDMT (entresto, aldactone, jardiance) until IVÁN resolves, can be restarted in clinic. Unfortunately he has [employer] med[place] and is out of network and not a candidate to receive advanced therapies at [facility]. He will follow up in our clinic in 1 week and search for alternate center to pursue LVAD/ OHTx.       Goals of Care Treatment Preferences:  Code Status: Full Code      Consults (From admission, onward)        Status Ordering Provider     Inpatient consult to PICC team (NIAS)  Once        Provider:  (Not yet assigned)    Completed ASIM AN          Significant Diagnostic Studies: Labs: All labs within the past 24 hours have been reviewed    Pending Diagnostic Studies:     None        Final Active Diagnoses:    Diagnosis Date Noted POA    PRINCIPAL PROBLEM:  HFrEF (heart failure with reduced ejection fraction) [I50.20] 10/07/2023 Yes    Paroxysmal atrial fibrillation [I48.0] 10/15/2023 Unknown    Ventricular fibrillation [I49.01] 10/12/2023 Unknown    PAF (paroxysmal atrial fibrillation) [I48.0] 10/11/2023 Yes    IVÁN (acute kidney injury)  [N17.9] 10/08/2023 Yes    CAD (coronary artery disease) [I25.10] 10/07/2023 Yes    DM (diabetes mellitus) [E11.9] 10/07/2023 Yes    Ventricular tachycardia [I47.20] 10/07/2023 Yes      Problems Resolved During this Admission:      Discharged Condition: stable    Disposition: Home or Self Care    Follow Up:    Patient Instructions:   No discharge procedures on file.  Medications:  Reconciled Home Medications:      Medication List      START taking these medications    amiodarone 200 MG Tab  Commonly known as: PACERONE  Take 2 tablets (400 mg total) by mouth 2 (two) times daily for 5 days, THEN 2 tablets (400 mg total) once daily.  Start taking on: October 16, 2023     DOBUTamine 1,000 mg/250 mL (4,000 mcg/mL) infusion  Commonly known as: DOBUTREX  Inject 385 mcg/min into the vein continuous.     ELIQUIS 5 mg Tab  Generic drug: apixaban  Take 1 tablet (5 mg total) by mouth 2 (two) times daily.        CHANGE how you take these medications    furosemide 40 MG tablet  Commonly known as: LASIX  Take 1 tablet (40 mg total) by mouth daily as needed (for weight gain of >3 lbs in day).  What changed:   · when to take this  · reasons to take this        CONTINUE taking these medications    aspirin 81 MG EC tablet  Commonly known as: ECOTRIN  Take 81 mg by mouth once daily.     atorvastatin 40 MG tablet  Commonly known as: LIPITOR  Take 40 mg by mouth.     clopidogreL 75 mg tablet  Commonly known as: PLAVIX  Take 75 mg by mouth once daily.     cyanocobalamin 2000 MCG tablet  Take 3,000 mcg by mouth once daily.     fish oil-omega-3 fatty acids 300-1,000 mg capsule  Take 2 g by mouth once daily.     gabapentin 300 MG capsule  Commonly known as: NEURONTIN  Take 300 mg by mouth nightly as needed.     multivitamin capsule  Take 1 capsule by mouth once daily.        STOP taking these medications    carvediloL 6.25 MG tablet  Commonly known as: COREG     cinnamon bark 500 mg capsule     ENTRESTO 24-26 mg per tablet  Generic drug:  sacubitriL-valsartan     isosorbide dinitrate 30 MG Tab  Commonly known as: ISORDIL     JARDIANCE 10 mg tablet  Generic drug: empagliflozin     metFORMIN 1000 MG tablet  Commonly known as: GLUCOPHAGE     metOLazone 5 MG tablet  Commonly known as: ZAROXDANNAN            Jimy An PA-C  Heart Transplant  [facility]

## 2023-10-16 NOTE — PROGRESS NOTES
Discharge Note:  Pt to be dc today per team.  SW met with pt and his wife to discuss dc plans. Both reported that they are doing well at this time.  Both voiced understanding and agreement with plans.  Bessie with [employer] to come today for teaching and home  pump set up.  Bessie notified of dc.  SW has contacted [facility] with referral for home health. Spoke with Jacqui with [facility].  CRISTAL spoke with pt about following up with Social Security office to inquire about eligibility for MCR.  Both pt and his wife voiced understanding and agreement with dc plans and contacted  office.  Pt reported having a ride home today.  Pt also reported that he has received list of in network transplant centers for his reference.    No other needs reported at this time by team or by pt/spouse.     [facility] [phone]  [facility]  [phone]

## 2023-10-16 NOTE — HOSPITAL COURSE
Mr mora was transferred from  10/12. Milrinone was switched to  on arrival due to sCr of 2.8. He got RHC on 10/13 which showed RA: 2 RV: 43/2 PA: 35/16, mean 23 PCWP: 12. Determined he needs  since the RHC numbers were so much improved compared to his RHC a few days earlier at OSH off of inotropes which showed PCWP >30 and CI of 1.1. Kidney function slowly recovering with holding of diuretics. Holding all his GDMT (entresto, aldactone, jardiance) until IVÁN resolves, can be restarted in clinic. Unfortunately he has [employer] med[place] and is out of network and not a candidate to receive advanced therapies at [facility]. He will follow up in our clinic in 1 week and search for alternate center to pursue LVAD/ OHTx.

## 2023-10-16 NOTE — PROGRESS NOTES
Device check was done on 10/13/23.  Informed Jimy GUAMAN.     Inpatient device interrogation and/or reprogramming orders received; the industry representative was contacted and provided with patient's name and room number.

## 2023-10-16 NOTE — PROGRESS NOTES
[facility] - Cardiology Stepdown  Heart Transplant  Progress Note    Patient Name: Radha Abbott  MRN: [id number]  Admission Date: 10/12/2023  Hospital Length of Stay: 4 days  Attending Physician: Walter Mcintyre MD  Primary Care Provider: Vasu Kong MD  Principal Problem:HFrEF (heart failure with reduced ejection fraction)    Subjective:     Interval History: No acute events. Patient feeling well and ready for discharge. Will D/C once  delivered and f/u in clinic in 1 week.     Continuous Infusions:   DOBUTamine IV infusion (non-titrating) 5 mcg/kg/min (10/16/23 1202)     Scheduled Meds:   amiodarone  400 mg Oral BID    apixaban  5 mg Oral BID    aspirin  81 mg Oral Daily    atorvastatin  80 mg Oral Daily    clopidogreL  75 mg Oral Daily    sodium chloride 0.9%  10 mL Intravenous Q6H     PRN Meds:dextrose 10%, dextrose 10%, glucagon (human recombinant), insulin aspart U-100, LIDOcaine HCL 20 mg/ml (2%), Flushing PICC/Midline Protocol **AND** sodium chloride 0.9% **AND** sodium chloride 0.9%    Review of patient's allergies indicates:  No Known Allergies  Objective:     Vital Signs (Most Recent):  Temp: 98.6 °F (37 °C) (10/16/23 1159)  Pulse: 87 (10/16/23 1208)  Resp: 18 (10/16/23 1208)  BP: 119/68 (10/16/23 1159)  SpO2: 98 % (10/16/23 1208) Vital Signs (24h Range):  Temp:  [97.9 °F (36.6 °C)-98.6 °F (37 °C)] 98.6 °F (37 °C)  Pulse:  [78-99] 87  Resp:  [18-20] 18  SpO2:  [97 %-100 %] 98 %  BP: (109-120)/(61-77) 119/68     Patient Vitals for the past 72 hrs (Last 3 readings):   Weight   10/16/23 0452 77.5 kg (170 lb 13.7 oz)   10/15/23 0343 77.6 kg (171 lb 1.2 oz)       Body mass index is 23.17 kg/m².      Intake/Output Summary (Last 24 hours) at 10/16/2023 1441  Last data filed at 10/16/2023 0915  Gross per 24 hour   Intake 240 ml   Output --   Net 240 ml              Physical Exam  Constitutional:       Appearance: Normal appearance.   HENT:      Head: Normocephalic and atraumatic.   Eyes:       "Pupils: Pupils are equal, round, and reactive to light.   Neck:      Vascular: No JVD.      Comments: JVP does not appear elevated.   Cardiovascular:      Rate and Rhythm: Normal rate and regular rhythm.      Pulses: Normal pulses.      Heart sounds: Normal heart sounds.   Pulmonary:      Effort: Pulmonary effort is normal.      Breath sounds: Normal breath sounds.   Abdominal:      General: Bowel sounds are normal. There is no distension.      Palpations: Abdomen is soft.      Tenderness: There is no abdominal tenderness.   Musculoskeletal:         General: Normal range of motion.      Cervical back: Normal range of motion and neck supple.      Right lower leg: No edema.      Left lower leg: No edema.   Skin:     General: Skin is warm and dry.      Capillary Refill: Capillary refill takes less than 2 seconds.   Neurological:      General: No focal deficit present.      Mental Status: He is alert and oriented to person, place, and time.   Psychiatric:         Mood and Affect: Mood normal.         Behavior: Behavior normal.            Significant Labs:  CBC:  Recent Labs   Lab 10/14/23  0326 10/14/23  1039 10/15/23  0431 10/16/23  1147   WBC 3.83*  --  3.77* 4.14   RBC 4.10*  --  3.98* 4.06*   HGB 12.6*  --  12.2* 12.4*   HCT 36.3* 36 35.8* 36.6*     --  191 200   MCV 89  --  90 90   MCH 30.7  --  30.7 30.5   MCHC 34.7  --  34.1 33.9       BNP:  Recent Labs   Lab 10/10/23  0627   *       CMP:  Recent Labs   Lab 10/14/23  0326 10/15/23  0431 10/16/23  1147   * 155* 161*   CALCIUM 9.3 9.1 9.0    137 137   K 3.8 4.2 4.1   CO2 24 22* 21*    103 106   BUN 37* 33* 28*   CREATININE 2.0* 2.1* 1.9*        Coagulation:   Recent Labs   Lab 10/14/23  0326 10/15/23  0431 10/16/23  0518   INR 1.0 1.0 1.0   APTT 27.8 27.8 30.5       LDH:  No results for input(s): "LDH" in the last 72 hours.  Microbiology:  Microbiology Results (last 7 days)       ** No results found for the last 168 hours. **      "       I have reviewed all pertinent labs within the past 24 hours.    Estimated Creatinine Clearance: 44.8 mL/min (A) (based on SCr of 1.9 mg/dL (H)).    Diagnostic Results:  I have reviewed all pertinent imaging results/findings within the past 24 hours.    Assessment and Plan:     No notes on file    * HFrEF (heart failure with reduced ejection fraction)  Ischemic Cardiomyopathy  Euvolemic on exam. Possibly dry w/ CVP 3 and uptrend in sCr with resumption of lasix. Will hold his lasix and monitor.   Home GDMT w/ Entresto, Isordil, Aldactone, Jardiance, Coreg (currently on hold).  RHC findings on 10/13 on  5 as follows: RA: 2 RV: 43/2 PA: 35/16, mean 23 PCWP: 12    Will hold diuretics and use PRN at d/c  Will get PICC line for home inotropes. Will plan for D/C once HH therapies are set up.   Unfortunately out of network for advanced options and will need to be evaluated and treated surgically at outside facility.  Will d/c today pending  being delivered bedside.     Paroxysmal atrial fibrillation  New Dx of pAF at OSH. Was transferred over on heparin gtt. Has been in NSR with PVCs since admission but will interrogate device and start NOAC.     IVÁN (acute kidney injury)  Normal kidney function in march 2022. Unclear if IVÁN due to cardiorenal or new baseline, but improving with addition of inotrope. Hold diuresis and nephrotoxic medications.     DM (diabetes mellitus)  On PO glycemic control at home. Will cover with SSI while admitted.     CAD (coronary artery disease)  Continue DAPT    Ventricular tachycardia  S/p 48 hour IV amio load at OSH. Transition to 400 PO mg amio BID.         Jimy An PA-C  Heart Transplant  [facility] - Cardiology Stepdown

## 2023-10-16 NOTE — SUBJECTIVE & OBJECTIVE
Interval History: No acute events. Patient feeling well and ready for discharge. Will D/C once  delivered and f/u in clinic in 1 week.     Continuous Infusions:   DOBUTamine IV infusion (non-titrating) 5 mcg/kg/min (10/16/23 1202)     Scheduled Meds:   amiodarone  400 mg Oral BID    apixaban  5 mg Oral BID    aspirin  81 mg Oral Daily    atorvastatin  80 mg Oral Daily    clopidogreL  75 mg Oral Daily    sodium chloride 0.9%  10 mL Intravenous Q6H     PRN Meds:dextrose 10%, dextrose 10%, glucagon (human recombinant), insulin aspart U-100, LIDOcaine HCL 20 mg/ml (2%), Flushing PICC/Midline Protocol **AND** sodium chloride 0.9% **AND** sodium chloride 0.9%    Review of patient's allergies indicates:  No Known Allergies  Objective:     Vital Signs (Most Recent):  Temp: 98.6 °F (37 °C) (10/16/23 1159)  Pulse: 87 (10/16/23 1208)  Resp: 18 (10/16/23 1208)  BP: 119/68 (10/16/23 1159)  SpO2: 98 % (10/16/23 1208) Vital Signs (24h Range):  Temp:  [97.9 °F (36.6 °C)-98.6 °F (37 °C)] 98.6 °F (37 °C)  Pulse:  [78-99] 87  Resp:  [18-20] 18  SpO2:  [97 %-100 %] 98 %  BP: (109-120)/(61-77) 119/68     Patient Vitals for the past 72 hrs (Last 3 readings):   Weight   10/16/23 0452 77.5 kg (170 lb 13.7 oz)   10/15/23 0343 77.6 kg (171 lb 1.2 oz)       Body mass index is 23.17 kg/m².      Intake/Output Summary (Last 24 hours) at 10/16/2023 1441  Last data filed at 10/16/2023 0915  Gross per 24 hour   Intake 240 ml   Output --   Net 240 ml              Physical Exam  Constitutional:       Appearance: Normal appearance.   HENT:      Head: Normocephalic and atraumatic.   Eyes:      Pupils: Pupils are equal, round, and reactive to light.   Neck:      Vascular: No JVD.      Comments: JVP does not appear elevated.   Cardiovascular:      Rate and Rhythm: Normal rate and regular rhythm.      Pulses: Normal pulses.      Heart sounds: Normal heart sounds.   Pulmonary:      Effort: Pulmonary effort is normal.      Breath sounds: Normal breath  "sounds.   Abdominal:      General: Bowel sounds are normal. There is no distension.      Palpations: Abdomen is soft.      Tenderness: There is no abdominal tenderness.   Musculoskeletal:         General: Normal range of motion.      Cervical back: Normal range of motion and neck supple.      Right lower leg: No edema.      Left lower leg: No edema.   Skin:     General: Skin is warm and dry.      Capillary Refill: Capillary refill takes less than 2 seconds.   Neurological:      General: No focal deficit present.      Mental Status: He is alert and oriented to person, place, and time.   Psychiatric:         Mood and Affect: Mood normal.         Behavior: Behavior normal.            Significant Labs:  CBC:  Recent Labs   Lab 10/14/23  0326 10/14/23  1039 10/15/23  0431 10/16/23  1147   WBC 3.83*  --  3.77* 4.14   RBC 4.10*  --  3.98* 4.06*   HGB 12.6*  --  12.2* 12.4*   HCT 36.3* 36 35.8* 36.6*     --  191 200   MCV 89  --  90 90   MCH 30.7  --  30.7 30.5   MCHC 34.7  --  34.1 33.9       BNP:  Recent Labs   Lab 10/10/23  0627   *       CMP:  Recent Labs   Lab 10/14/23  0326 10/15/23  0431 10/16/23  1147   * 155* 161*   CALCIUM 9.3 9.1 9.0    137 137   K 3.8 4.2 4.1   CO2 24 22* 21*    103 106   BUN 37* 33* 28*   CREATININE 2.0* 2.1* 1.9*        Coagulation:   Recent Labs   Lab 10/14/23  0326 10/15/23  0431 10/16/23  0518   INR 1.0 1.0 1.0   APTT 27.8 27.8 30.5       LDH:  No results for input(s): "LDH" in the last 72 hours.  Microbiology:  Microbiology Results (last 7 days)       ** No results found for the last 168 hours. **            I have reviewed all pertinent labs within the past 24 hours.    Estimated Creatinine Clearance: 44.8 mL/min (A) (based on SCr of 1.9 mg/dL (H)).    Diagnostic Results:  I have reviewed all pertinent imaging results/findings within the past 24 hours.  "

## 2023-10-16 NOTE — NURSING
Pt discharged per MD orders.  Tele discontinued and returned to station. Medication list and prescriptions reviewed; prescriptions sent to pt preferred pharmacy and printed prescriptions provided.  Pt verbalizes understanding of all written and verbal discharge instructions.  Pt awaiting family/escort arrival.  Will continue to monitor.

## 2023-10-16 NOTE — ASSESSMENT & PLAN NOTE
Ischemic Cardiomyopathy  Euvolemic on exam. Possibly dry w/ CVP 3 and uptrend in sCr with resumption of lasix. Will hold his lasix and monitor.   Home GDMT w/ Entresto, Isordil, Aldactone, Jardiance, Coreg (currently on hold).  RHC findings on 10/13 on  5 as follows: RA: 2 RV: 43/2 PA: 35/16, mean 23 PCWP: 12    Will hold diuretics and use PRN at d/c  Will get PICC line for home inotropes. Will plan for D/C once HH therapies are set up.   Unfortunately out of network for advanced options and will need to be evaluated and treated surgically at outside facility.  Will d/c today pending  being delivered bedside.

## 2023-10-17 PROBLEM — I50.43 ACUTE ON CHRONIC COMBINED SYSTOLIC AND DIASTOLIC CHF, NYHA CLASS 4: Status: ACTIVE | Noted: 2023-10-17

## 2023-10-17 PROCEDURE — G0180 PR HOME HEALTH MD CERTIFICATION: ICD-10-PCS | Mod: NTX,,, | Performed by: INTERNAL MEDICINE

## 2023-10-17 PROCEDURE — G0180 MD CERTIFICATION HHA PATIENT: HCPCS | Mod: NTX,,, | Performed by: INTERNAL MEDICINE

## 2023-10-18 ENCOUNTER — NURSE TRIAGE (OUTPATIENT)
Dept: ADMINISTRATIVE | Facility: CLINIC | Age: 63
End: 2023-10-18
Payer: MEDICAID

## 2023-10-18 ENCOUNTER — ON-DEMAND VIRTUAL (OUTPATIENT)
Dept: URGENT CARE | Facility: CLINIC | Age: 63
End: 2023-10-18
Payer: MEDICAID

## 2023-10-18 VITALS
WEIGHT: 171.63 LBS | RESPIRATION RATE: 20 BRPM | BODY MASS INDEX: 23.27 KG/M2 | TEMPERATURE: 98 F | SYSTOLIC BLOOD PRESSURE: 113 MMHG | DIASTOLIC BLOOD PRESSURE: 75 MMHG | HEART RATE: 100 BPM

## 2023-10-18 DIAGNOSIS — R10.13 EPIGASTRIC PAIN: Primary | ICD-10-CM

## 2023-10-18 PROBLEM — Z45.02 DEFIBRILLATOR DISCHARGE: Status: ACTIVE | Noted: 2023-10-18

## 2023-10-18 PROCEDURE — 99285 EMERGENCY DEPT VISIT HI MDM: CPT | Mod: 27

## 2023-10-18 PROCEDURE — 99213 PR OFFICE/OUTPT VISIT, EST, LEVL III, 20-29 MIN: ICD-10-PCS | Mod: 95,TXP,, | Performed by: PHYSICIAN ASSISTANT

## 2023-10-18 PROCEDURE — 99213 OFFICE O/P EST LOW 20 MIN: CPT | Mod: 95,TXP,, | Performed by: PHYSICIAN ASSISTANT

## 2023-10-18 NOTE — TELEPHONE ENCOUNTER
Arlyn Abbott states that pt was given Pepcid inpatient for GERD symptoms. States that pt was not discharge with Pepcid and now c/o abdominal bloating and upper abdominal pain 3-4/10 that is relieved with burping. Advised to be seen per protocol. Unable to schedule appt per protocol. [facility] offered and accepted. Encounter routed to provider.       Reason for Disposition   [1] MILD SWELLING of abdomen (e.g., looks distended or swollen) AND [2] new-onset or getting worse    Additional Information   Negative: Sounds like a life-threatening emergency to the triager   Negative: [1] MODERATE-SEVERE SWELLING of abdomen (e.g., looks very distended or swollen) AND [2] NEW-onset or much worse AND [3] vomiting   Negative: Blood in bowel movements  (Exception: Blood on surface of BM with constipation.)   Negative: Black or tarry bowel movements  (Exception: Chronic-unchanged black-grey BMs AND is taking iron pills or Pepto-Bismol.)   Negative: [1] MODERATE-SEVERE SWELLING of abdomen (e.g., looks very distended or swollen) AND [2] NEW-onset or much worse   Negative: [1] MILD SWELLING of abdomen (e.g., looks distended or swollen) AND [2] new-onset or getting worse AND [3] fever   Negative: [1] MILD-MODERATE abdomen pain AND [2] constant AND [3] present > 2 hours   Negative: [1] Vomiting AND [2] contains bile (green color)   Negative: [1] MILD abdomen pain (e.g., does not interfere with normal activities) AND [2] pain comes and goes (cramps) AND [3] present > 48 hours  (Exception: Mild abdomen pain is a chronic symptom, recurrent or ongoing AND present > 4 weeks.)   Negative: White of the eyes have turned yellow (i.e., jaundice)    Protocols used: Abdomen Bloating and Swelling-A-AH

## 2023-10-19 ENCOUNTER — HOSPITAL ENCOUNTER (OUTPATIENT)
Facility: HOSPITAL | Age: 63
Discharge: HOME | End: 2023-10-20
Attending: EMERGENCY MEDICINE | Admitting: INTERNAL MEDICINE
Payer: MEDICAID

## 2023-10-19 ENCOUNTER — HOSPITAL ENCOUNTER (EMERGENCY)
Facility: HOSPITAL | Age: 63
Discharge: HOME | End: 2023-10-19
Attending: FAMILY MEDICINE
Payer: MEDICAID

## 2023-10-19 ENCOUNTER — PATIENT OUTREACH (OUTPATIENT)
Dept: ADMINISTRATIVE | Facility: CLINIC | Age: 63
End: 2023-10-19
Payer: MEDICAID

## 2023-10-19 VITALS
WEIGHT: 177 LBS | BODY MASS INDEX: 24.01 KG/M2 | TEMPERATURE: 98 F | SYSTOLIC BLOOD PRESSURE: 122 MMHG | OXYGEN SATURATION: 100 % | HEART RATE: 98 BPM | DIASTOLIC BLOOD PRESSURE: 73 MMHG | RESPIRATION RATE: 20 BRPM

## 2023-10-19 DIAGNOSIS — R60.9 EDEMA, UNSPECIFIED TYPE: ICD-10-CM

## 2023-10-19 DIAGNOSIS — R06.02 SOB (SHORTNESS OF BREATH): ICD-10-CM

## 2023-10-19 DIAGNOSIS — R06.02 SOB (SHORTNESS OF BREATH): Primary | ICD-10-CM

## 2023-10-19 DIAGNOSIS — R06.02 SHORTNESS OF BREATH: ICD-10-CM

## 2023-10-19 DIAGNOSIS — I50.43 ACUTE ON CHRONIC COMBINED SYSTOLIC AND DIASTOLIC CHF, NYHA CLASS 4: Primary | ICD-10-CM

## 2023-10-19 DIAGNOSIS — I50.9 CHF (CONGESTIVE HEART FAILURE): ICD-10-CM

## 2023-10-19 LAB
ALBUMIN SERPL BCP-MCNC: 4 G/DL (ref 3.5–5.2)
ALBUMIN SERPL BCP-MCNC: 4.2 G/DL (ref 3.5–5.2)
ALP SERPL-CCNC: 69 U/L (ref 55–135)
ALP SERPL-CCNC: 81 U/L (ref 55–135)
ALT SERPL W/O P-5'-P-CCNC: 19 U/L (ref 10–44)
ALT SERPL W/O P-5'-P-CCNC: 20 U/L (ref 10–44)
ANION GAP SERPL CALC-SCNC: 12 MMOL/L (ref 8–16)
ANION GAP SERPL CALC-SCNC: 13 MMOL/L (ref 8–16)
ASCENDING AORTA: 3.27 CM
AST SERPL-CCNC: 20 U/L (ref 10–40)
AST SERPL-CCNC: 20 U/L (ref 10–40)
AV INDEX (PROSTH): 0.92
AV MEAN GRADIENT: 2 MMHG
AV PEAK GRADIENT: 3 MMHG
AV VALVE AREA BY VELOCITY RATIO: 2.9 CM²
AV VALVE AREA: 2.92 CM²
AV VELOCITY RATIO: 0.91
BASOPHILS # BLD AUTO: 0.03 K/UL (ref 0–0.2)
BASOPHILS # BLD AUTO: 0.04 K/UL (ref 0–0.2)
BASOPHILS NFR BLD: 0.5 % (ref 0–1.9)
BASOPHILS NFR BLD: 0.8 % (ref 0–1.9)
BILIRUB SERPL-MCNC: 0.7 MG/DL (ref 0.1–1)
BILIRUB SERPL-MCNC: 0.8 MG/DL (ref 0.1–1)
BNP SERPL-MCNC: 703 PG/ML (ref 0–99)
BSA FOR ECHO PROCEDURE: 2 M2
BUN SERPL-MCNC: 29 MG/DL (ref 8–23)
BUN SERPL-MCNC: 31 MG/DL (ref 8–23)
CALCIUM SERPL-MCNC: 9.3 MG/DL (ref 8.7–10.5)
CALCIUM SERPL-MCNC: 9.7 MG/DL (ref 8.7–10.5)
CHLORIDE SERPL-SCNC: 104 MMOL/L (ref 95–110)
CHLORIDE SERPL-SCNC: 104 MMOL/L (ref 95–110)
CO2 SERPL-SCNC: 22 MMOL/L (ref 23–29)
CO2 SERPL-SCNC: 22 MMOL/L (ref 23–29)
CREAT SERPL-MCNC: 2.1 MG/DL (ref 0.5–1.4)
CREAT SERPL-MCNC: 2.1 MG/DL (ref 0.5–1.4)
CV ECHO LV RWT: 0.34 CM
DIFFERENTIAL METHOD: ABNORMAL
DIFFERENTIAL METHOD: ABNORMAL
DOP CALC AO PEAK VEL: 0.81 M/S
DOP CALC AO VTI: 11.08 CM
DOP CALC LVOT AREA: 3.2 CM2
DOP CALC LVOT DIAMETER: 2.01 CM
DOP CALC LVOT PEAK VEL: 0.74 M/S
DOP CALC LVOT STROKE VOLUME: 32.35 CM3
DOP CALCLVOT PEAK VEL VTI: 10.2 CM
E WAVE DECELERATION TIME: 93.35 MSEC
E/A RATIO: 3.34
E/E' RATIO: 18 M/S
ECHO LV POSTERIOR WALL: 1.2 CM (ref 0.6–1.1)
EOSINOPHIL # BLD AUTO: 0.1 K/UL (ref 0–0.5)
EOSINOPHIL # BLD AUTO: 0.1 K/UL (ref 0–0.5)
EOSINOPHIL NFR BLD: 1.8 % (ref 0–8)
EOSINOPHIL NFR BLD: 2.1 % (ref 0–8)
ERYTHROCYTE [DISTWIDTH] IN BLOOD BY AUTOMATED COUNT: 13.4 % (ref 11.5–14.5)
ERYTHROCYTE [DISTWIDTH] IN BLOOD BY AUTOMATED COUNT: 13.7 % (ref 11.5–14.5)
EST. GFR  (NO RACE VARIABLE): 35 ML/MIN/1.73 M^2
EST. GFR  (NO RACE VARIABLE): 35.2 ML/MIN/1.73 M^2
FRACTIONAL SHORTENING: 13 % (ref 28–44)
GLUCOSE SERPL-MCNC: 167 MG/DL (ref 70–110)
GLUCOSE SERPL-MCNC: 174 MG/DL (ref 70–110)
HCT VFR BLD AUTO: 35.5 % (ref 40–54)
HCT VFR BLD AUTO: 36.7 % (ref 40–54)
HCV AB SERPL QL IA: NORMAL
HGB BLD-MCNC: 12 G/DL (ref 14–18)
HGB BLD-MCNC: 12.5 G/DL (ref 14–18)
HIV 1+2 AB+HIV1 P24 AG SERPL QL IA: NORMAL
IMM GRANULOCYTES # BLD AUTO: 0.01 K/UL (ref 0–0.04)
IMM GRANULOCYTES # BLD AUTO: 0.03 K/UL (ref 0–0.04)
IMM GRANULOCYTES NFR BLD AUTO: 0.2 % (ref 0–0.5)
IMM GRANULOCYTES NFR BLD AUTO: 0.5 % (ref 0–0.5)
INFLUENZA A, MOLECULAR: NOT DETECTED
INFLUENZA B, MOLECULAR: NOT DETECTED
INTERVENTRICULAR SEPTUM: 0.62 CM (ref 0.6–1.1)
LA MAJOR: 6.73 CM
LA MINOR: 7.19 CM
LA WIDTH: 5.68 CM
LACTATE SERPL-SCNC: 1 MMOL/L (ref 0.5–2.2)
LEFT ATRIUM SIZE: 4.63 CM
LEFT ATRIUM VOLUME INDEX: 77.3 ML/M2
LEFT ATRIUM VOLUME: 155.41 CM3
LEFT INTERNAL DIMENSION IN SYSTOLE: 6.2 CM (ref 2.1–4)
LEFT VENTRICLE DIASTOLIC VOLUME INDEX: 123.32 ML/M2
LEFT VENTRICLE DIASTOLIC VOLUME: 247.88 ML
LEFT VENTRICLE MASS INDEX: 146 G/M2
LEFT VENTRICLE SYSTOLIC VOLUME INDEX: 96.5 ML/M2
LEFT VENTRICLE SYSTOLIC VOLUME: 193.95 ML
LEFT VENTRICULAR INTERNAL DIMENSION IN DIASTOLE: 7.1 CM (ref 3.5–6)
LEFT VENTRICULAR MASS: 293.32 G
LV LATERAL E/E' RATIO: 13 M/S
LV SEPTAL E/E' RATIO: 29.25 M/S
LYMPHOCYTES # BLD AUTO: 1.1 K/UL (ref 1–4.8)
LYMPHOCYTES # BLD AUTO: 1.3 K/UL (ref 1–4.8)
LYMPHOCYTES NFR BLD: 21.1 % (ref 18–48)
LYMPHOCYTES NFR BLD: 22.1 % (ref 18–48)
MCH RBC QN AUTO: 30.7 PG (ref 27–31)
MCH RBC QN AUTO: 30.9 PG (ref 27–31)
MCHC RBC AUTO-ENTMCNC: 33.8 G/DL (ref 32–36)
MCHC RBC AUTO-ENTMCNC: 34.1 G/DL (ref 32–36)
MCV RBC AUTO: 91 FL (ref 82–98)
MCV RBC AUTO: 91 FL (ref 82–98)
MONOCYTES # BLD AUTO: 0.4 K/UL (ref 0.3–1)
MONOCYTES # BLD AUTO: 0.4 K/UL (ref 0.3–1)
MONOCYTES NFR BLD: 6.8 % (ref 4–15)
MONOCYTES NFR BLD: 6.8 % (ref 4–15)
MV PEAK A VEL: 0.35 M/S
MV PEAK E VEL: 1.17 M/S
MV STENOSIS PRESSURE HALF TIME: 27.07 MS
MV VALVE AREA P 1/2 METHOD: 8.13 CM2
NEUTROPHILS # BLD AUTO: 3.6 K/UL (ref 1.8–7.7)
NEUTROPHILS # BLD AUTO: 3.9 K/UL (ref 1.8–7.7)
NEUTROPHILS NFR BLD: 68 % (ref 38–73)
NEUTROPHILS NFR BLD: 69.3 % (ref 38–73)
NRBC BLD-RTO: 0 /100 WBC
NRBC BLD-RTO: 0 /100 WBC
OHS LV EJECTION FRACTION SIMPSONS BIPLANE MOD: 18 %
PLATELET # BLD AUTO: 186 K/UL (ref 150–450)
PLATELET # BLD AUTO: 195 K/UL (ref 150–450)
PMV BLD AUTO: 10.2 FL (ref 9.2–12.9)
PMV BLD AUTO: 10.9 FL (ref 9.2–12.9)
POCT GLUCOSE: 140 MG/DL (ref 70–110)
POCT GLUCOSE: 173 MG/DL (ref 70–110)
POTASSIUM SERPL-SCNC: 4.2 MMOL/L (ref 3.5–5.1)
POTASSIUM SERPL-SCNC: 4.3 MMOL/L (ref 3.5–5.1)
PROT SERPL-MCNC: 7.5 G/DL (ref 6–8.4)
PROT SERPL-MCNC: 8.2 G/DL (ref 6–8.4)
RA MAJOR: 5.17 CM
RA PRESSURE ESTIMATED: 8 MMHG
RA WIDTH: 3.59 CM
RBC # BLD AUTO: 3.91 M/UL (ref 4.6–6.2)
RBC # BLD AUTO: 4.05 M/UL (ref 4.6–6.2)
RIGHT VENTRICULAR END-DIASTOLIC DIMENSION: 3.3 CM
RSV AG BY MOLECULAR METHOD: NOT DETECTED
SARS-COV-2 RNA RESP QL NAA+PROBE: NOT DETECTED
SINUS: 3.2 CM
SODIUM SERPL-SCNC: 138 MMOL/L (ref 136–145)
SODIUM SERPL-SCNC: 139 MMOL/L (ref 136–145)
STJ: 3.03 CM
TDI LATERAL: 0.09 M/S
TDI SEPTAL: 0.04 M/S
TDI: 0.07 M/S
TRICUSPID ANNULAR PLANE SYSTOLIC EXCURSION: 1.87 CM
TROPONIN I SERPL DL<=0.01 NG/ML-MCNC: 0.04 NG/ML (ref 0–0.03)
TROPONIN I SERPL DL<=0.01 NG/ML-MCNC: 0.06 NG/ML (ref 0–0.03)
WBC # BLD AUTO: 5.12 K/UL (ref 3.9–12.7)
WBC # BLD AUTO: 5.75 K/UL (ref 3.9–12.7)
Z-SCORE OF LEFT VENTRICULAR DIMENSION IN END DIASTOLE: 1.87
Z-SCORE OF LEFT VENTRICULAR DIMENSION IN END SYSTOLE: 4.27

## 2023-10-19 PROCEDURE — 25000003 PHARM REV CODE 250: Mod: NTX | Performed by: STUDENT IN AN ORGANIZED HEALTH CARE EDUCATION/TRAINING PROGRAM

## 2023-10-19 PROCEDURE — 85025 COMPLETE CBC W/AUTO DIFF WBC: CPT | Mod: NTX | Performed by: FAMILY MEDICINE

## 2023-10-19 PROCEDURE — 25000003 PHARM REV CODE 250: Mod: NTX | Performed by: PHYSICIAN ASSISTANT

## 2023-10-19 PROCEDURE — 93005 ELECTROCARDIOGRAM TRACING: CPT | Mod: TXP

## 2023-10-19 PROCEDURE — 83605 ASSAY OF LACTIC ACID: CPT | Mod: NTX | Performed by: PHYSICIAN ASSISTANT

## 2023-10-19 PROCEDURE — G0378 HOSPITAL OBSERVATION PER HR: HCPCS | Mod: NTX

## 2023-10-19 PROCEDURE — 63600175 PHARM REV CODE 636 W HCPCS: Mod: NTX | Performed by: STUDENT IN AN ORGANIZED HEALTH CARE EDUCATION/TRAINING PROGRAM

## 2023-10-19 PROCEDURE — 83880 ASSAY OF NATRIURETIC PEPTIDE: CPT | Mod: NTX | Performed by: FAMILY MEDICINE

## 2023-10-19 PROCEDURE — 99285 EMERGENCY DEPT VISIT HI MDM: CPT | Mod: 25

## 2023-10-19 PROCEDURE — 85025 COMPLETE CBC W/AUTO DIFF WBC: CPT | Mod: 91,NTX | Performed by: EMERGENCY MEDICINE

## 2023-10-19 PROCEDURE — 94761 N-INVAS EAR/PLS OXIMETRY MLT: CPT | Mod: NTX

## 2023-10-19 PROCEDURE — 84484 ASSAY OF TROPONIN QUANT: CPT | Mod: 91,NTX | Performed by: EMERGENCY MEDICINE

## 2023-10-19 PROCEDURE — 99223 1ST HOSP IP/OBS HIGH 75: CPT | Mod: NTX,,, | Performed by: INTERNAL MEDICINE

## 2023-10-19 PROCEDURE — 80053 COMPREHEN METABOLIC PANEL: CPT | Mod: 91,NTX | Performed by: EMERGENCY MEDICINE

## 2023-10-19 PROCEDURE — 93005 ELECTROCARDIOGRAM TRACING: CPT | Mod: NTX

## 2023-10-19 PROCEDURE — 87389 HIV-1 AG W/HIV-1&-2 AB AG IA: CPT | Mod: NTX | Performed by: PHYSICIAN ASSISTANT

## 2023-10-19 PROCEDURE — 93010 EKG 12-LEAD: ICD-10-PCS | Mod: 59,NTX,, | Performed by: STUDENT IN AN ORGANIZED HEALTH CARE EDUCATION/TRAINING PROGRAM

## 2023-10-19 PROCEDURE — 96374 THER/PROPH/DIAG INJ IV PUSH: CPT | Mod: NTX

## 2023-10-19 PROCEDURE — 0241U SARS-COV2 (COVID) WITH FLU/RSV BY PCR: CPT | Mod: NTX | Performed by: INTERNAL MEDICINE

## 2023-10-19 PROCEDURE — 93010 ELECTROCARDIOGRAM REPORT: CPT | Mod: 59,NTX,, | Performed by: STUDENT IN AN ORGANIZED HEALTH CARE EDUCATION/TRAINING PROGRAM

## 2023-10-19 PROCEDURE — 99223 PR INITIAL HOSPITAL CARE,LEVL III: ICD-10-PCS | Mod: NTX,,, | Performed by: INTERNAL MEDICINE

## 2023-10-19 PROCEDURE — 25500020 PHARM REV CODE 255: Mod: NTX | Performed by: INTERNAL MEDICINE

## 2023-10-19 PROCEDURE — 80053 COMPREHEN METABOLIC PANEL: CPT | Mod: NTX | Performed by: FAMILY MEDICINE

## 2023-10-19 PROCEDURE — 86803 HEPATITIS C AB TEST: CPT | Mod: NTX | Performed by: PHYSICIAN ASSISTANT

## 2023-10-19 PROCEDURE — 84484 ASSAY OF TROPONIN QUANT: CPT | Mod: NTX | Performed by: FAMILY MEDICINE

## 2023-10-19 PROCEDURE — 93010 ELECTROCARDIOGRAM REPORT: CPT | Mod: NTX,,, | Performed by: STUDENT IN AN ORGANIZED HEALTH CARE EDUCATION/TRAINING PROGRAM

## 2023-10-19 PROCEDURE — 93010 EKG 12-LEAD: ICD-10-PCS | Mod: NTX,,, | Performed by: STUDENT IN AN ORGANIZED HEALTH CARE EDUCATION/TRAINING PROGRAM

## 2023-10-19 PROCEDURE — 63600175 PHARM REV CODE 636 W HCPCS: Mod: JZ,JG,NTX | Performed by: PHYSICIAN ASSISTANT

## 2023-10-19 RX ORDER — FAMOTIDINE 20 MG/1
20 TABLET, FILM COATED ORAL DAILY
Status: DISCONTINUED | OUTPATIENT
Start: 2023-10-19 | End: 2023-10-20 | Stop reason: HOSPADM

## 2023-10-19 RX ORDER — DOBUTAMINE HYDROCHLORIDE 400 MG/100ML
5 INJECTION INTRAVENOUS CONTINUOUS
Status: DISCONTINUED | OUTPATIENT
Start: 2023-10-19 | End: 2023-10-20 | Stop reason: HOSPADM

## 2023-10-19 RX ORDER — INSULIN ASPART 100 [IU]/ML
0-5 INJECTION, SOLUTION INTRAVENOUS; SUBCUTANEOUS
Status: DISCONTINUED | OUTPATIENT
Start: 2023-10-19 | End: 2023-10-20 | Stop reason: HOSPADM

## 2023-10-19 RX ORDER — ASPIRIN 81 MG/1
81 TABLET ORAL DAILY
Status: DISCONTINUED | OUTPATIENT
Start: 2023-10-19 | End: 2023-10-20 | Stop reason: HOSPADM

## 2023-10-19 RX ORDER — IBUPROFEN 200 MG
24 TABLET ORAL
Status: DISCONTINUED | OUTPATIENT
Start: 2023-10-19 | End: 2023-10-20 | Stop reason: HOSPADM

## 2023-10-19 RX ORDER — FUROSEMIDE 10 MG/ML
40 INJECTION INTRAMUSCULAR; INTRAVENOUS DAILY
Status: DISCONTINUED | OUTPATIENT
Start: 2023-10-19 | End: 2023-10-19

## 2023-10-19 RX ORDER — GLUCAGON 1 MG
1 KIT INJECTION
Status: DISCONTINUED | OUTPATIENT
Start: 2023-10-19 | End: 2023-10-20 | Stop reason: HOSPADM

## 2023-10-19 RX ORDER — ATORVASTATIN CALCIUM 20 MG/1
40 TABLET, FILM COATED ORAL DAILY
Status: DISCONTINUED | OUTPATIENT
Start: 2023-10-19 | End: 2023-10-20 | Stop reason: HOSPADM

## 2023-10-19 RX ORDER — FUROSEMIDE 10 MG/ML
40 INJECTION INTRAMUSCULAR; INTRAVENOUS 2 TIMES DAILY
Status: DISCONTINUED | OUTPATIENT
Start: 2023-10-19 | End: 2023-10-19

## 2023-10-19 RX ORDER — FUROSEMIDE 10 MG/ML
60 INJECTION INTRAMUSCULAR; INTRAVENOUS
Status: DISCONTINUED | OUTPATIENT
Start: 2023-10-19 | End: 2023-10-19

## 2023-10-19 RX ORDER — AMIODARONE HYDROCHLORIDE 200 MG/1
400 TABLET ORAL DAILY
Status: DISCONTINUED | OUTPATIENT
Start: 2023-10-22 | End: 2023-10-20 | Stop reason: HOSPADM

## 2023-10-19 RX ORDER — AMIODARONE HYDROCHLORIDE 200 MG/1
400 TABLET ORAL DAILY
Status: DISCONTINUED | OUTPATIENT
Start: 2023-10-23 | End: 2023-10-19

## 2023-10-19 RX ORDER — IBUPROFEN 200 MG
16 TABLET ORAL
Status: DISCONTINUED | OUTPATIENT
Start: 2023-10-19 | End: 2023-10-20 | Stop reason: HOSPADM

## 2023-10-19 RX ORDER — AMIODARONE HYDROCHLORIDE 200 MG/1
400 TABLET ORAL 2 TIMES DAILY
Status: DISCONTINUED | OUTPATIENT
Start: 2023-10-19 | End: 2023-10-20 | Stop reason: HOSPADM

## 2023-10-19 RX ORDER — AMIODARONE HYDROCHLORIDE 200 MG/1
400 TABLET ORAL DAILY
Status: DISCONTINUED | OUTPATIENT
Start: 2023-10-19 | End: 2023-10-19

## 2023-10-19 RX ORDER — SODIUM CHLORIDE 0.9 % (FLUSH) 0.9 %
10 SYRINGE (ML) INJECTION
Status: DISCONTINUED | OUTPATIENT
Start: 2023-10-19 | End: 2023-10-20 | Stop reason: HOSPADM

## 2023-10-19 RX ADMIN — ALTEPLASE 2 MG: 2.2 INJECTION, POWDER, LYOPHILIZED, FOR SOLUTION INTRAVENOUS at 02:10

## 2023-10-19 RX ADMIN — HUMAN ALBUMIN MICROSPHERES AND PERFLUTREN 0.66 MG: 10; .22 INJECTION, SOLUTION INTRAVENOUS at 09:10

## 2023-10-19 RX ADMIN — AMIODARONE HYDROCHLORIDE 400 MG: 200 TABLET ORAL at 08:10

## 2023-10-19 RX ADMIN — APIXABAN 5 MG: 5 TABLET, FILM COATED ORAL at 08:10

## 2023-10-19 RX ADMIN — ATORVASTATIN CALCIUM 40 MG: 40 TABLET, FILM COATED ORAL at 08:10

## 2023-10-19 RX ADMIN — DOBUTAMINE HYDROCHLORIDE 5 MCG/KG/MIN: 400 INJECTION INTRAVENOUS at 05:10

## 2023-10-19 RX ADMIN — FAMOTIDINE 20 MG: 20 TABLET ORAL at 05:10

## 2023-10-19 RX ADMIN — FUROSEMIDE 40 MG: 10 INJECTION, SOLUTION INTRAVENOUS at 08:10

## 2023-10-19 RX ADMIN — ASPIRIN 81 MG: 81 TABLET, COATED ORAL at 08:10

## 2023-10-19 NOTE — ED NOTES
Patient identifiers for Radha Abbott 63 y.o. male checked and correct.  Chief Complaint   Patient presents with    Shortness of Breath     Pt reports SOB that started 2 nights ago and abdm bloating that started yesterday. Hx of CHF     Past Medical History:   Diagnosis Date    CAD (coronary artery disease)     Diabetes mellitus     HFrEF (heart failure with reduced ejection fraction)     ICD (implantable cardioverter-defibrillator) in place     MI, old      Allergies reported: Review of patient's allergies indicates:  No Known Allergies      LOC: Patient is awake, alert, and aware of environment with an appropriate affect. Patient is oriented x 4 and speaking appropriately.  APPEARANCE: Patient resting comfortably and in no acute distress. Patient is clean and well groomed, in hospital gown  HEENT: WDL  SKIN: The skin is warm and dry. Patient has normal skin turgor and moist mucus membranes.   MUSKULOSKELETAL: Patient is moving all extremities well, no obvious deformities noted. Pulses intact.   RESPIRATORY: Airway is open and patent. Respirations are spontaneous, pt reports SOB; o2 currently 96% on RA  CARDIAC: Patient has a normal rate and rhythm. 92 on cardiac monitor. No peripheral edema noted.   ABDOMEN: Some abdominal distention noted. Firm and non-tender upon palpation.  NEUROLOGICAL:  PERRL. Facial expression is symmetrical. Hand grasps are equal bilaterally. Normal sensation in all extremities when touched with finger.

## 2023-10-19 NOTE — ED NOTES
"Attempted to collect blood specimens from PICC line; unable to draw blood, flushes without difficulty. Family at bedside requested that the PICC line be no longer pulled from due to fear of "messing it up"; Informed pt that saline lock IV would be used to collect labs. Pt and family agrees.  Saline lock IV placed on 1st attempt, blood drawn, and then pt requested that it be removed immediately. Informed pt that in case we need to draw more labs we would have to either stick him again or we can just pull from saline lock. Pt became visibly upset stating " this arm is already sore from being poked so much, if yall have to get more blood, get someone else to pull from the PICC." Saline lock removed and CN notified.  "

## 2023-10-19 NOTE — ED NOTES
Tele box [id number] applied to pt. Edmond in war room states able to see pt on monitor, rhythm NSR with HR 89.

## 2023-10-19 NOTE — ASSESSMENT & PLAN NOTE
Normal kidney function in march 2022. Unclear if IVÁN due to cardiorenal or new baseline, but improved with addition of inotrope. sCr 1.9 at recent discharge.   Now sCr 2.1   Continue diuresis and adjust as needed.   Continue   Avoid nephrotoxins, renally dose meds

## 2023-10-19 NOTE — ED PROVIDER NOTES
Encounter Date: 10/19/2023       History     Chief Complaint   Patient presents with    Shortness of Breath     Pt reports SOB that started 2 nights ago and abdm bloating that started yesterday. Hx of CHF     HPI    This is a pleasant 63-year-old male presents the ER for evaluation of shortness of breath and abdominal bloating.  Has a history of heart failure with AICD, currently on dobutamine drip.  Patient was recently admitted and discharged from heart transplant team for IVÁN hypovolemia concern for over-diuresis.  He was medically optimized and subsequently discharged.  He was switched from a Milrinone drip to dobutamine.  Patient reports since discharge, he was told not to take his Lasix.  He has not taken it as told.  Since then abdominal bloating and shortness of breath he was seen at outside hospital but was not happy with their care and signed out AMA and came the ER for further evaluation.    Review of patient's allergies indicates:  No Known Allergies  Past Medical History:   Diagnosis Date    CAD (coronary artery disease)     Diabetes mellitus     HFrEF (heart failure with reduced ejection fraction)     ICD (implantable cardioverter-defibrillator) in place     MI, old      Past Surgical History:   Procedure Laterality Date    CARDIAC SURGERY      RIGHT HEART CATHETERIZATION Right 10/10/2023    Procedure: INSERTION, CATHETER, RIGHT HEART;  Surgeon: Bin Gandhi MD;  Location: [facility] CATH LAB;  Service: Cardiology;  Laterality: Right;    RIGHT HEART CATHETERIZATION Right 10/13/2023    Procedure: INSERTION, CATHETER, RIGHT HEART;  Surgeon: Walter Mcintyre MD;  Location: [facility] CATH LAB;  Service: Cardiology;  Laterality: Right;     History reviewed. No pertinent family history.  Social History     Tobacco Use    Smoking status: Every Day     Current packs/day: 0.50     Types: Cigarettes   Substance Use Topics    Alcohol use: Yes     Comment: rarely    Drug use: No     Review of Systems   Respiratory:   Positive for shortness of breath.    Gastrointestinal:  Positive for abdominal distention.   All other systems reviewed and are negative.      Physical Exam     Initial Vitals   BP Pulse Resp Temp SpO2   10/19/23 0340 10/16/23 0659 10/19/23 0340 10/19/23 0340 10/19/23 0340   110/65 80 18 97.8 °F (36.6 °C) 99 %      MAP       --                Physical Exam    Nursing note and vitals reviewed.  Constitutional:   Elderly chronically ill-appearing no distress   HENT:   Head: Normocephalic and atraumatic.   Eyes: Pupils are equal, round, and reactive to light.   Neck:   Normal range of motion.  Cardiovascular:  Normal rate, regular rhythm and normal heart sounds.           Pulmonary/Chest: No respiratory distress.   No respiratory distress slightly diminished breath sounds but no overt crackles or wheezing   Abdominal: Abdomen is soft. He exhibits no distension. There is no abdominal tenderness.   Musculoskeletal:         General: No edema. Normal range of motion.      Cervical back: Normal range of motion.     Neurological: He is alert and oriented to person, place, and time. GCS score is 15. GCS eye subscore is 4. GCS verbal subscore is 5. GCS motor subscore is 6.   Skin: Skin is warm and dry. Capillary refill takes less than 2 seconds.   Psychiatric: He has a normal mood and affect.         ED Course   Procedures  Labs Reviewed   CBC W/ AUTO DIFFERENTIAL - Abnormal; Notable for the following components:       Result Value    RBC 4.05 (*)     Hemoglobin 12.5 (*)     Hematocrit 36.7 (*)     All other components within normal limits   COMPREHENSIVE METABOLIC PANEL - Abnormal; Notable for the following components:    CO2 22 (*)     Glucose 167 (*)     BUN 29 (*)     Creatinine 2.1 (*)     eGFR 35.2 (*)     All other components within normal limits   TROPONIN I - Abnormal; Notable for the following components:    Troponin I 0.044 (*)     All other components within normal limits   HIV 1 / 2 ANTIBODY    Narrative:      Release to patient->Immediate   HEPATITIS C ANTIBODY    Narrative:     Release to patient->Immediate   SARS-COV2 (COVID) WITH FLU/RSV BY PCR     EKG Readings: (Independently Interpreted)   Sinus rhythm 61 beats per minute  milliseconds, no acute STEMI chronic changes noted     ECG Results              EKG 12-lead (Final result)  Result time 10/19/23 19:46:04      Final result by Interface, Lab In [facility] (10/19/23 19:46:04)                   Narrative:    Test Reason : R06.02,    Vent. Rate : 096 BPM     Atrial Rate : 096 BPM     P-R Int : 166 ms          QRS Dur : 142 ms      QT Int : 398 ms       P-R-T Axes : 077 140 -22 degrees     QTc Int : 502 ms    Normal sinus rhythm  Nonspecific intraventricular block  T wave abnormality, consider inferolateral ischemia  Abnormal ECG  When compared with ECG of 07-OCT-2023 09:48,  Premature ventricular complexes are no longer Present  Questionable change in QRS duration  Confirmed by José Manuel Neves MD ([id number]) on 10/19/2023 7:45:51 PM    Referred By: [email or web address]   SELF           Confirmed By:José Manuel Neves MD                                  Imaging Results              X-Ray Chest AP Portable (Final result)  Result time 10/19/23 05:58:17      Final result by Cain Stover MD (10/19/23 05:58:17)                   Impression:      Continued demonstration of cardiomegaly, but there has been no significant detrimental interval change in the appearance of the chest since the examinations referenced above.      Electronically signed by: Cain Stover MD  Date:    10/19/2023  Time:    05:58               Narrative:    EXAMINATION:  XR CHEST AP PORTABLE    TECHNIQUE:  One view    COMPARISON:  Comparison is made to 10/19/2023 at 00:42 and to 10/13/2023.    FINDINGS:  Postoperative changes are again noted in the thorax, as is the presence of a cardioverter/defibrillator.  Vascular catheter entering from the right arm has its tip in the superior vena cava.  Cardiomediastinal  silhouette is again seen to be prominent, but the degree of cardiomegaly and the appearance of the cardiomediastinal silhouette have not changed appreciably since the examinations referenced above.  Lung zones are stable and remain essentially clear, with no significant superimposed airspace consolidation or volume loss evident.  No pleural fluid.  No pneumothorax.                                       Medications   perflutren protein-A microsphr 0.22 mg/mL IV susp ( Intravenous Canceled Entry 10/19/23 1045)   alteplase injection 2 mg (2 mg Intra-Catheter Given 10/19/23 1451)     Medical Decision Making  Pleasant 63-year-old male extensive complex cardiac history including heart failure on dobutamine drip presents the ER for abdominal bloating and shortness of breath.  Was recently admitted for over-diuresis IVÁN, was discharged advised not to take his lasix but not told wonder restarted.  Reports worsening shortness of breath abdominal bloating.  Seen at outside facility and left AMA to come here.  He is overall well appearing no acute distress hemodynamically stable no pitting edema slightly diminished breath sounds, discussed with Cardiology team.  Given complex cardiac history will plan to evaluate bedside.    Amount and/or Complexity of Data Reviewed  Independent Historian: spouse  External Data Reviewed: labs, radiology, ECG and notes.     Details: Previous hospitalization with transplant team, clinic visits, ER visits  Labs: ordered. Decision-making details documented in ED Course.  Radiology: ordered and independent interpretation performed. Decision-making details documented in ED Course.  ECG/medicine tests: ordered and independent interpretation performed.    Risk  Decision regarding hospitalization.               ED Course as of 10/21/23 1839   Thu Oct 19, 2023   0519 Cardiology bedside evaluating patient deciding on disposition. [SE]   0538 Resting in bed no distress patient to be admitted to cardiology  services.  Labs pending at this time. [SE]   0553 Troponin I [SE]   0553 CBC auto differential [SE]   0553 HIV 1/2 Ag/Ab (4th Gen) [SE]   0553 Hepatitis C Antibody [SE]   0553 Comprehensive metabolic panel [SE]   0553 X-Ray Chest AP Portable [SE]      ED Course User Index  [SE] Edilson Sánchez MD                    Clinical Impression:   Final diagnoses:  [R06.02] SOB (shortness of breath)  [R06.02] Shortness of breath  [I50.9] CHF (congestive heart failure)        ED Disposition Condition    Observation                 Edilson Sánchez MD  10/21/23 6681

## 2023-10-19 NOTE — ED NOTES
Pt's family at bedside called RN to ask if pt is stable enough to drive himself to [place]. MD notified and called to bedside by RN.

## 2023-10-19 NOTE — ASSESSMENT & PLAN NOTE
Hx of Vfib  With ICD discharge last week S/p 48 hour IV amio load at OSH.   Continue 400 PO mg amio BID X3 days then amio 400 qd.

## 2023-10-19 NOTE — ASSESSMENT & PLAN NOTE
Ischemic Cardiomyopathy  Euvolemic on exam. However given symptoms of PND or orthopnea, suspect he may be carrying fluid.   Home GDMT w/ Entresto, Isordil, Aldactone, Jardiance, (all currently on hold).  RHC findings on 10/13 on  5 as follows: RA: 2 RV: 43/2 PA: 35/16, mean 23 PCWP: 12     Plan  Will restart IV diuretics and adjust as needed.  UOP goal net negative 1.5L    Continue home    Unfortunately out of network for advanced options and will need to be evaluated and treated surgically at outside facility.

## 2023-10-19 NOTE — SUBJECTIVE & OBJECTIVE
Past Medical History:   Diagnosis Date    CAD (coronary artery disease)     Diabetes mellitus     HFrEF (heart failure with reduced ejection fraction)     ICD (implantable cardioverter-defibrillator) in place     MI, old        Past Surgical History:   Procedure Laterality Date    CARDIAC SURGERY      RIGHT HEART CATHETERIZATION Right 10/10/2023    Procedure: INSERTION, CATHETER, RIGHT HEART;  Surgeon: Bin Gandhi MD;  Location: [facility] CATH LAB;  Service: Cardiology;  Laterality: Right;    RIGHT HEART CATHETERIZATION Right 10/13/2023    Procedure: INSERTION, CATHETER, RIGHT HEART;  Surgeon: Walter Mcintyre MD;  Location: [facility] CATH LAB;  Service: Cardiology;  Laterality: Right;       Review of patient's allergies indicates:  No Known Allergies    Current Facility-Administered Medications   Medication    amiodarone tablet 400 mg    apixaban tablet 5 mg    aspirin EC tablet 81 mg    atorvastatin tablet 40 mg    DOBUtamine 1000 mg in D5W 250 mL infusion     Current Outpatient Medications   Medication Sig    amiodarone (PACERONE) 200 MG Tab Take 2 tablets (400 mg total) by mouth 2 (two) times daily for 5 days, THEN 2 tablets (400 mg total) once daily.    apixaban (ELIQUIS) 5 mg Tab Take 1 tablet (5 mg total) by mouth 2 (two) times daily.    aspirin (ECOTRIN) 81 MG EC tablet Take 81 mg by mouth once daily.    atorvastatin (LIPITOR) 40 MG tablet Take 40 mg by mouth.    DOBUTamine (DOBUTREX) 1,000 mg/250 mL (4,000 mcg/mL) infusion Inject 385 mcg/min into the vein continuous.    fish oil-omega-3 fatty acids 300-1,000 mg capsule Take 2 g by mouth once daily.    furosemide (LASIX) 40 MG tablet Take 1 tablet (40 mg total) by mouth daily as needed (for weight gain of >3 lbs in day).    gabapentin (NEURONTIN) 300 MG capsule Take 300 mg by mouth nightly as needed.    multivitamin capsule Take 1 capsule by mouth once daily.     Family History    None       Tobacco Use    Smoking status: Every Day     Current packs/day: 0.50      Types: Cigarettes    Smokeless tobacco: Not on file   Substance and Sexual Activity    Alcohol use: Yes     Comment: rarely    Drug use: No    Sexual activity: Not on file     Review of Systems   Constitutional:  Negative for activity change, appetite change and chills.   HENT: Negative.     Eyes: Negative.    Respiratory:  Positive for shortness of breath. Negative for chest tightness.    Cardiovascular:  Negative for chest pain, palpitations and leg swelling.   Gastrointestinal:  Positive for abdominal distention. Negative for abdominal pain.   Endocrine: Negative.    Genitourinary: Negative.    Musculoskeletal: Negative.    Allergic/Immunologic: Negative.    Neurological:  Negative for dizziness and light-headedness.   Hematological: Negative.    Psychiatric/Behavioral: Negative.       Objective:     Vital Signs (Most Recent):  Temp: 97.8 °F (36.6 °C) (10/19/23 0340)  Pulse: 99 (10/19/23 0340)  Resp: 18 (10/19/23 0340)  BP: 110/65 (10/19/23 0340)  SpO2: 99 % (10/19/23 0340) Vital Signs (24h Range):  Temp:  [97.8 °F (36.6 °C)-98.4 °F (36.9 °C)] 97.8 °F (36.6 °C)  Pulse:  [] 99  Resp:  [18-20] 18  SpO2:  [99 %-100 %] 99 %  BP: (106-122)/(65-75) 110/65     Patient Vitals for the past 72 hrs (Last 3 readings):   Weight   10/19/23 0340 77.9 kg (171 lb 12.8 oz)     Body mass index is 23.3 kg/m².    No intake or output data in the 24 hours ending 10/19/23 0538       Physical Exam  Constitutional:       General: He is not in acute distress.     Appearance: Normal appearance. He is not ill-appearing, toxic-appearing or diaphoretic.   HENT:      Head: Normocephalic.      Nose: Nose normal.      Mouth/Throat:      Mouth: Mucous membranes are moist.   Eyes:      Extraocular Movements: Extraocular movements intact.   Cardiovascular:      Rate and Rhythm: Normal rate and regular rhythm.      Pulses: Normal pulses.      Heart sounds: Normal heart sounds. No murmur heard.     No gallop.   Pulmonary:      Effort: Pulmonary  "effort is normal. No respiratory distress.      Breath sounds: No wheezing or rales.   Abdominal:      General: Abdomen is flat. Bowel sounds are normal. There is no distension.      Tenderness: There is no abdominal tenderness.   Musculoskeletal:         General: No swelling or tenderness. Normal range of motion.      Cervical back: Normal range of motion.   Skin:     General: Skin is warm.   Neurological:      Mental Status: He is alert and oriented to person, place, and time. Mental status is at baseline.   Psychiatric:         Mood and Affect: Mood normal.         Behavior: Behavior normal.         Thought Content: Thought content normal.            Significant Labs:  CBC:  Recent Labs   Lab 10/15/23  0431 10/16/23  1147 10/19/23  0106   WBC 3.77* 4.14 5.75   RBC 3.98* 4.06* 3.91*   HGB 12.2* 12.4* 12.0*   HCT 35.8* 36.6* 35.5*    200 186   MCV 90 90 91   MCH 30.7 30.5 30.7   MCHC 34.1 33.9 33.8     BNP:  Recent Labs   Lab 10/19/23  0106   *     CMP:  Recent Labs   Lab 10/15/23  0431 10/16/23  1147 10/19/23  0106   * 161* 174*   CALCIUM 9.1 9.0 9.3   ALBUMIN  --   --  4.0   PROT  --   --  7.5    137 139   K 4.2 4.1 4.2   CO2 22* 21* 22*    106 104   BUN 33* 28* 31*   CREATININE 2.1* 1.9* 2.1*   ALKPHOS  --   --  69   ALT  --   --  20   AST  --   --  20   BILITOT  --   --  0.7      Coagulation:   Recent Labs   Lab 10/14/23  0326 10/15/23  0431 10/16/23  0518   INR 1.0 1.0 1.0   APTT 27.8 27.8 30.5     LDH:  No results for input(s): "LDH" in the last 72 hours.  Microbiology:  Microbiology Results (last 7 days)       ** No results found for the last 168 hours. **            BMP:   Recent Labs   Lab 10/16/23  1147 10/19/23  0106   * 174*    139   K 4.1 4.2    104   CO2 21* 22*   BUN 28* 31*   CREATININE 1.9* 2.1*   CALCIUM 9.0 9.3   MG 2.1  --      Cardiac Markers: No results for input(s): "CKMB", "TROPONINT", "MYOGLOBIN" in the last 72 hours.  Coagulation: No " "results for input(s): "PT", "INR", "APTT" in the last 72 hours.  I have reviewed all pertinent labs within the past 24 hours.    Diagnostic Results:  Imaging Results              X-Ray Chest AP Portable (In process)                       "

## 2023-10-19 NOTE — HPI
Pt is a 62yo M with pmhx of CAD s/p 3v CABG (unclear anatomy, 2009), ICM with a recent EF of 15-20% s/p ICD (elsa 2009), DM2 (a1c 7.7), HTN, HLD, Vfib on amio who presents to the ED for dyspnea.     The patient was recently admitted to the [facility] service on 10/12 as a transfer from [facility] for IVÁN and CHF. During this time he was placed on . He got RHC on 10/13 which showed RA: 2 RV: 43/2 PA: 35/16, mean 23 PCWP: 12. Kidney function slowly recovering with holding of diuretics.  His GDMT (entresto, aldactone, jardiance)  and diuretics were held. Due to having [employer] and is out of network and not a candidate to receive advanced therapies at [facility]. He was discharged with  5 with plan to follow up in our clinic in 1 week and search for alternate center to pursue LVAD/ OHTx.     Since his discharge he reports orthopnea and PND. Denies ICD discharge. Denies dyspnea with exertion or bendopnea, chest pain , palpitations, weight changes (baseline weight 171lb), LE edema. He denies increased fluid intake. He feels his abdomen seems bullard. He went to [facility] at first where labs showed , trop 0.059, sCr 2.1 (1.9 on prior discharge). CXR looks clear. Beside US showed CVP approx 3mmHg. He was admitted to the [facility] service for observation.

## 2023-10-19 NOTE — PROGRESS NOTES
Subjective:      Patient ID: Radha Abbott is a 63 y.o. male.    Vitals:  weight is 77.8 kg (171 lb 9.6 oz). His temperature is 97.8 °F (36.6 °C). His blood pressure is 113/75 and his pulse is 100. His respiration is 20.     Chief Complaint: Abdominal Pain      Visit Type: TELE AUDIOVISUAL    Present with the patient at the time of consultation: TELEMED PRESENT WITH PATIENT: spouse    Past Medical History:   Diagnosis Date    CAD (coronary artery disease)     Diabetes mellitus     HFrEF (heart failure with reduced ejection fraction)     ICD (implantable cardioverter-defibrillator) in place     MI, old      Past Surgical History:   Procedure Laterality Date    CARDIAC SURGERY      RIGHT HEART CATHETERIZATION Right 10/10/2023    Procedure: INSERTION, CATHETER, RIGHT HEART;  Surgeon: Bin Gandhi MD;  Location: [facility] CATH LAB;  Service: Cardiology;  Laterality: Right;    RIGHT HEART CATHETERIZATION Right 10/13/2023    Procedure: INSERTION, CATHETER, RIGHT HEART;  Surgeon: Walter Mcintyre MD;  Location: [facility] CATH LAB;  Service: Cardiology;  Laterality: Right;     Review of patient's allergies indicates:  No Known Allergies  Current Outpatient Medications on File Prior to Visit   Medication Sig Dispense Refill    amiodarone (PACERONE) 200 MG Tab Take 2 tablets (400 mg total) by mouth 2 (two) times daily for 5 days, THEN 2 tablets (400 mg total) once daily. 740 tablet 0    apixaban (ELIQUIS) 5 mg Tab Take 1 tablet (5 mg total) by mouth 2 (two) times daily. 60 tablet 11    aspirin (ECOTRIN) 81 MG EC tablet Take 81 mg by mouth once daily.      atorvastatin (LIPITOR) 40 MG tablet Take 40 mg by mouth.      clopidogrel (PLAVIX) 75 mg tablet Take 75 mg by mouth once daily.      cyanocobalamin 2000 MCG tablet Take 3,000 mcg by mouth once daily.      DOBUTamine (DOBUTREX) 1,000 mg/250 mL (4,000 mcg/mL) infusion Inject 385 mcg/min into the vein continuous.      fish oil-omega-3 fatty acids 300-1,000 mg capsule Take 2 g by  mouth once daily.      furosemide (LASIX) 40 MG tablet Take 1 tablet (40 mg total) by mouth daily as needed (for weight gain of >3 lbs in day). 30 tablet 11    gabapentin (NEURONTIN) 300 MG capsule Take 300 mg by mouth nightly as needed.      multivitamin capsule Take 1 capsule by mouth once daily.       No current facility-administered medications on file prior to visit.     No family history on file.        Ohs Peq Odvv Intake    10/18/2023 11:17 PM CDT - Filed by Patient   Describe your reason for todays visit Fluid around stomach   What is your current physical address in the event of a medical emergency? [id number] [facility] [id number]   Are you able to take your vital signs? Yes   Systolic Blood Pressure: 113   Diastolic Blood Pressure: 75   Weight: 171.6   Height: 6   Pulse: 100   Temperature: 97.8   Respiration rate: 20   Pulse Oxygen:    Please attach any relevant images or files          HPI  64yo male with extensive cardiac history with recent cardiac cath presents with c/o epigastric pain and abdominal bloating x yesterday. States tonight is even worse. Pain is right under his ribs and when lays down feels like going up to his throat and wants to throw up. Also notes his stomach feels distended. Denies radiation into his back. Denies lightheadedness/dizziness, chest pain, huerta, sob, palpitations, LE edema. Weight stable. Does note he had similar symptoms in the hospital but this is significantly worse. Pain started when he was eating tonight. BM normal. No BRB in stools or melena.         Constitution: Negative for fever and generalized weakness.   Cardiovascular:  Negative for chest pain, leg swelling, palpitations and sob on exertion.   Respiratory:  Negative for cough and shortness of breath.    Gastrointestinal:  Positive for abdominal pain, abdominal bloating, nausea and heartburn. Negative for vomiting, constipation, diarrhea, bright red blood in stool and dark colored stools.   Neurological:   Negative for dizziness and light-headedness.        Objective:   The physical exam was conducted virtually.  Physical Exam   Constitutional: He is oriented to person, place, and time.  Non-toxic appearance. He does not appear ill. No distress.   HENT:   Head: Normocephalic and atraumatic.   Pulmonary/Chest: Effort normal. No respiratory distress.   Neurological: He is oriented to person, place, and time. Coordination normal.   Skin: Skin is dry, not diaphoretic, not pale and no rash. jaundice  Psychiatric: His behavior is normal. Judgment and thought content normal.       Assessment:     1. Epigastric pain        Plan:       Epigastric pain    Please go to emergency room for further evaluation. Wife states they live five minutes away and she will transport him there now. They verbalize understanding and agreement with plan.

## 2023-10-19 NOTE — ED NOTES
Radha Abbott, a 63 y.o. male presents to the ED w/ complaint of SOB and abdominal bloating . Patient states that he just came from another hospital where he left against medical advice due not having a good experience with the staff there. Patient has a PICC line , currently receiving a continuous IV Dobutamine. Hx of CHF. Alert x 4. Spouse at bedside.     Triage note:  Chief Complaint   Patient presents with    Shortness of Breath     Pt reports SOB that started 2 nights ago and abdm bloating that started yesterday. Hx of CHF     Review of patient's allergies indicates:  No Known Allergies  Past Medical History:   Diagnosis Date    CAD (coronary artery disease)     Diabetes mellitus     HFrEF (heart failure with reduced ejection fraction)     ICD (implantable cardioverter-defibrillator) in place     MI, old

## 2023-10-19 NOTE — ED PROVIDER NOTES
"SCRIBE #1 NOTE: I, Basiliamyrna Jiménez, am scribing for, and in the presence of, Yolanda Wilson MD. I have scribed the entire note.       History     Chief Complaint   Patient presents with    Shortness of Breath     Patient states his abdomen feels "bloated" and when he sits down has SOB. D/c from [facility] 10/16 with PICC with dobutamine infusing. Denies N/V/D. Called nurse hotline and told him to come here.     Review of patient's allergies indicates:  No Known Allergies      History of Present Illness     HPI    10/19/2023, 12:19 AM  History obtained from the patient      History of Present Illness: Radha Abbott is a 63 y.o. male patient with a PMHx of DM, MI, CAD, HFrEF, and ICD who presents to the Emergency Department for evaluation of SOB. PT notes his abd feels "bloated" when he sits down. Pt called the nurse hotline and was recommended to the ED. Symptoms are constant and moderate in severity. No mitigating or exacerbating factors reported. No associated sxs reported. Patient denies any N/V/D, cough, fever, chills, and all other sxs at this time. Pt was d/c from [facility] 10/16 with PICC with dobutamine infusing.  No further complaints or concerns at this time.       Arrival mode: Personal vehicle      PCP: Vasu Kong MD        Past Medical History:  Past Medical History:   Diagnosis Date    CAD (coronary artery disease)     Diabetes mellitus     HFrEF (heart failure with reduced ejection fraction)     ICD (implantable cardioverter-defibrillator) in place     MI, old        Past Surgical History:  Past Surgical History:   Procedure Laterality Date    CARDIAC SURGERY      RIGHT HEART CATHETERIZATION Right 10/10/2023    Procedure: INSERTION, CATHETER, RIGHT HEART;  Surgeon: Bin Gandhi MD;  Location: [facility] CATH LAB;  Service: Cardiology;  Laterality: Right;    RIGHT HEART CATHETERIZATION Right 10/13/2023    Procedure: INSERTION, CATHETER, RIGHT HEART;  Surgeon: Walter Mcintyre MD;  Location: " [facility] CATH LAB;  Service: Cardiology;  Laterality: Right;         Family History:  No family history on file.    Social History:  Social History     Tobacco Use    Smoking status: Every Day     Current packs/day: 0.50     Types: Cigarettes    Smokeless tobacco: Not on file   Substance and Sexual Activity    Alcohol use: Yes     Comment: rarely    Drug use: No    Sexual activity: Not on file        Review of Systems     Review of Systems   Constitutional:  Negative for chills and fever.   HENT:  Negative for sore throat.    Respiratory:  Positive for shortness of breath.    Cardiovascular:  Negative for chest pain.   Gastrointestinal:  Negative for diarrhea, nausea and vomiting.        Abd bloating.    Genitourinary:  Negative for dysuria.   Musculoskeletal:  Negative for back pain.   Skin:  Negative for rash.   Neurological:  Negative for weakness.   Hematological:  Does not bruise/bleed easily.   All other systems reviewed and are negative.       Physical Exam     Initial Vitals [10/18/23 2359]   BP Pulse Resp Temp SpO2   106/66 99 18 98.4 °F (36.9 °C) 99 %      MAP       --          Physical Exam  Nursing Notes and Vital Signs Reviewed.  Constitutional: Patient is in no acute distress. Well-developed and well-nourished.  Head: Atraumatic. Normocephalic.  Eyes: PERRL. EOM intact. Conjunctivae are not pale. No scleral icterus.  ENT: Mucous membranes are moist. Oropharynx is clear and symmetric.    Neck: Supple. Full ROM. No lymphadenopathy.  Cardiovascular: Regular rate. Regular rhythm. No murmurs, rubs, or gallops. Distal pulses are 2+ and symmetric. PICC line in R arm.  Pulmonary/Chest: No respiratory distress. Clear to auscultation bilaterally. No wheezing or rales.  Abdominal: Slightly distended but no tenderness.  No rebound, guarding, or rigidity.   Genitourinary: No CVA tenderness  Musculoskeletal: Moves all extremities. No obvious deformities. No edema.   Skin: Warm and dry.  Neurological:  Alert, awake, and  appropriate.  Normal speech.  No acute focal neurological deficits are appreciated.  Psychiatric: Normal affect. Good eye contact. Appropriate in content.     ED Course   Procedures  ED Vital Signs:  Vitals:    10/18/23 2359 10/19/23 0103 10/19/23 0105   BP: 106/66  122/73   Pulse: 99 96 98   Resp: 18  20   Temp: 98.4 °F (36.9 °C)     TempSrc: Oral     SpO2: 99%  100%   Weight: 80.3 kg (176 lb 15.9 oz)         Abnormal Lab Results:  Labs Reviewed   CBC W/ AUTO DIFFERENTIAL - Abnormal; Notable for the following components:       Result Value    RBC 3.91 (*)     Hemoglobin 12.0 (*)     Hematocrit 35.5 (*)     All other components within normal limits   COMPREHENSIVE METABOLIC PANEL - Abnormal; Notable for the following components:    CO2 22 (*)     Glucose 174 (*)     BUN 31 (*)     Creatinine 2.1 (*)     eGFR 35 (*)     All other components within normal limits   B-TYPE NATRIURETIC PEPTIDE - Abnormal; Notable for the following components:     (*)     All other components within normal limits   TROPONIN I - Abnormal; Notable for the following components:    Troponin I 0.059 (*)     All other components within normal limits        All Lab Results:  Results for orders placed or performed during the hospital encounter of 10/19/23   CBC auto differential   Result Value Ref Range    WBC 5.75 3.90 - 12.70 K/uL    RBC 3.91 (L) 4.60 - 6.20 M/uL    Hemoglobin 12.0 (L) 14.0 - 18.0 g/dL    Hematocrit 35.5 (L) 40.0 - 54.0 %    MCV 91 82 - 98 fL    MCH 30.7 27.0 - 31.0 pg    MCHC 33.8 32.0 - 36.0 g/dL    RDW 13.4 11.5 - 14.5 %    Platelets 186 150 - 450 K/uL    MPV 10.2 9.2 - 12.9 fL    Immature Granulocytes 0.5 0.0 - 0.5 %    Gran # (ANC) 3.9 1.8 - 7.7 K/uL    Immature Grans (Abs) 0.03 0.00 - 0.04 K/uL    Lymph # 1.3 1.0 - 4.8 K/uL    Mono # 0.4 0.3 - 1.0 K/uL    Eos # 0.1 0.0 - 0.5 K/uL    Baso # 0.03 0.00 - 0.20 K/uL    nRBC 0 0 /100 WBC    Gran % 68.0 38.0 - 73.0 %    Lymph % 22.1 18.0 - 48.0 %    Mono % 6.8 4.0 - 15.0  %    Eosinophil % 2.1 0.0 - 8.0 %    Basophil % 0.5 0.0 - 1.9 %    Differential Method Automated    Comprehensive metabolic panel   Result Value Ref Range    Sodium 139 136 - 145 mmol/L    Potassium 4.2 3.5 - 5.1 mmol/L    Chloride 104 95 - 110 mmol/L    CO2 22 (L) 23 - 29 mmol/L    Glucose 174 (H) 70 - 110 mg/dL    BUN 31 (H) 8 - 23 mg/dL    Creatinine 2.1 (H) 0.5 - 1.4 mg/dL    Calcium 9.3 8.7 - 10.5 mg/dL    Total Protein 7.5 6.0 - 8.4 g/dL    Albumin 4.0 3.5 - 5.2 g/dL    Total Bilirubin 0.7 0.1 - 1.0 mg/dL    Alkaline Phosphatase 69 55 - 135 U/L    AST 20 10 - 40 U/L    ALT 20 10 - 44 U/L    eGFR 35 (A) >60 mL/min/1.73 m^2    Anion Gap 13 8 - 16 mmol/L   B-Type natriuretic peptide (BNP)   Result Value Ref Range     (H) 0 - 99 pg/mL   Troponin I   Result Value Ref Range    Troponin I 0.059 (H) 0.000 - 0.026 ng/mL        Imaging Results:  Imaging Results              X-Ray Chest AP Portable (Final result)  Result time 10/19/23 06:19:23      Final result by Pj Velarde MD (10/19/23 06:19:23)                   Impression:      No adverse interval changes      Electronically signed by: Pj Velarde MD  Date:    10/19/2023  Time:    06:19               Narrative:    EXAMINATION:  XR CHEST AP PORTABLE    CLINICAL HISTORY:  Chest Pain;    FINDINGS:  Single view of the chest.    Comparison 10/13/2022    Postoperative changes are again noted in the thorax, as is the presence of a cardioverter/defibrillator.  Vascular catheter entering from the right arm has its tip in the superior vena cava.  Cardiomediastinal silhouette is again seen to be prominent, but the degree of cardiomegaly and the appearance of the cardiomediastinal silhouette have not changed appreciably since the examinations referenced above.  Lung zones are stable and remain essentially clear, with no significant superimposed airspace consolidation or volume loss evident.  No pleural fluid.  No pneumothorax.                                        The EKG was ordered, reviewed, and independently interpreted by the ED provider.  Interpretation time: 00:57  Rate: 96 BPM  Rhythm: normal sinus rhythm  Interpretation: Nonspecific intraventricular block T wave abnormality, consider inferolateral ischemia. No STEMI.             The Emergency Provider reviewed the vital signs and test results, which are outlined above.     ED Discussion     1:42 AM: Pt is A&O x3, appropriate, and of capacity at this time. Pt voices desire to leave hospital. D/w pt in length need for further evaluation and treatment due to HPI and PEx. Pt declines any further evaluation or tx at this time. All risks, including worsening sx, permanent bodily harm and death, were discussed in length. Pt acknowledges all risk at this time and agrees to sign AMA form. Pt given RTER instructions. All questions and concerns addressed at this time. Pt leaving AMA.           Medical Decision Making  Amount and/or Complexity of Data Reviewed  Labs: ordered. Decision-making details documented in ED Course.  Radiology: ordered. Decision-making details documented in ED Course.  ECG/medicine tests: ordered and independent interpretation performed. Decision-making details documented in ED Course.                ED Medication(s):  Medications - No data to display    Discharge Medication List as of 10/19/2023  2:36 AM                  Scribe Attestation:   Scribe #1: I performed the above scribed service and the documentation accurately describes the services I performed. I attest to the accuracy of the note.     Attending:   Physician Attestation Statement for Scribe #1: I, Yolanda Wilson MD, personally performed the services described in this documentation, as scribed by Basilia Jiménez, in my presence, and it is both accurate and complete.           Clinical Impression       ICD-10-CM ICD-9-CM   1. SOB (shortness of breath)  R06.02 786.05   2. Edema, unspecified type  R60.9 782.3       Disposition:    Disposition: AMA  Condition: Stable        Yolanda Wilson MD  10/20/23 5176

## 2023-10-19 NOTE — ASSESSMENT & PLAN NOTE
Continue ASA 81mg and statin. Patient not taking clopidogrel.   Will avoid triple therapy for now given that he is on eliquis for atrial fib.

## 2023-10-19 NOTE — PLAN OF CARE
Pt transferred from ED. He is AAOx4; afebrile; vital signs stable. PICC line not pulling back blood. Cath saroj instilled and it began working. Lactic 1.0. Plan to observe overnight. Wife at bedside. He is up independently.

## 2023-10-19 NOTE — H&P
[facility] - Emergency Dept  Heart Transplant  H&P    Patient Name: Radha Abbott  MRN: [id number]  Admission Date: 10/19/2023  Attending Physician: Sajan Hurley, *  Primary Care Provider: Vasu Kong MD  Principal Problem:Acute on chronic combined systolic and diastolic CHF, NYHA class 4    Subjective:     History of Present Illness:  Pt is a 62yo M with pmhx of CAD s/p 3v CABG (unclear anatomy, 2009), ICM with a recent EF of 15-20% s/p ICD ([employer] 2009), DM2 (a1c 7.7), HTN, HLD, Vfib on amio who presents to the ED for dyspnea.     The patient was recently admitted to the [facility] service on 10/12 as a transfer from [facility] for IVÁN and CHF. During this time he was placed on . He got RHC on 10/13 which showed RA: 2 RV: 43/2 PA: 35/16, mean 23 PCWP: 12. Kidney function slowly recovering with holding of diuretics.  His GDMT (entresto, aldactone, jardiance)  and diuretics were held. Due to having [employer] and is out of network and not a candidate to receive advanced therapies at [facility]. He was discharged with  5 with plan to follow up in our clinic in 1 week and search for alternate center to pursue LVAD/ OHTx.     Since his discharge he reports orthopnea and PND. Denies ICD discharge. Denies dyspnea with exertion or bendopnea, chest pain , palpitations, weight changes (baseline weight 171lb), LE edema. He denies increased fluid intake. He feels his abdomen seems bullard. He went to [facility] at first where labs showed , trop 0.059, sCr 2.1 (1.9 on prior discharge). CXR looks clear. Beside US showed CVP approx 3mmHg. He was admitted to the [facility] service for observation.       Past Medical History:   Diagnosis Date    CAD (coronary artery disease)     Diabetes mellitus     HFrEF (heart failure with reduced ejection fraction)     ICD (implantable cardioverter-defibrillator) in place     MI, old        Past Surgical History:   Procedure Laterality Date    CARDIAC SURGERY      RIGHT  HEART CATHETERIZATION Right 10/10/2023    Procedure: INSERTION, CATHETER, RIGHT HEART;  Surgeon: Bin Gandhi MD;  Location: [facility] CATH LAB;  Service: Cardiology;  Laterality: Right;    RIGHT HEART CATHETERIZATION Right 10/13/2023    Procedure: INSERTION, CATHETER, RIGHT HEART;  Surgeon: Walter Mcintyre MD;  Location: [facility] CATH LAB;  Service: Cardiology;  Laterality: Right;       Review of patient's allergies indicates:  No Known Allergies    Current Facility-Administered Medications   Medication    amiodarone tablet 400 mg    apixaban tablet 5 mg    aspirin EC tablet 81 mg    atorvastatin tablet 40 mg    DOBUtamine 1000 mg in D5W 250 mL infusion     Current Outpatient Medications   Medication Sig    amiodarone (PACERONE) 200 MG Tab Take 2 tablets (400 mg total) by mouth 2 (two) times daily for 5 days, THEN 2 tablets (400 mg total) once daily.    apixaban (ELIQUIS) 5 mg Tab Take 1 tablet (5 mg total) by mouth 2 (two) times daily.    aspirin (ECOTRIN) 81 MG EC tablet Take 81 mg by mouth once daily.    atorvastatin (LIPITOR) 40 MG tablet Take 40 mg by mouth.    DOBUTamine (DOBUTREX) 1,000 mg/250 mL (4,000 mcg/mL) infusion Inject 385 mcg/min into the vein continuous.    fish oil-omega-3 fatty acids 300-1,000 mg capsule Take 2 g by mouth once daily.    furosemide (LASIX) 40 MG tablet Take 1 tablet (40 mg total) by mouth daily as needed (for weight gain of >3 lbs in day).    gabapentin (NEURONTIN) 300 MG capsule Take 300 mg by mouth nightly as needed.    multivitamin capsule Take 1 capsule by mouth once daily.     Family History    None       Tobacco Use    Smoking status: Every Day     Current packs/day: 0.50     Types: Cigarettes    Smokeless tobacco: Not on file   Substance and Sexual Activity    Alcohol use: Yes     Comment: rarely    Drug use: No    Sexual activity: Not on file     Review of Systems   Constitutional:  Negative for activity change, appetite change and chills.   HENT: Negative.      Eyes: Negative.    Respiratory:  Positive for shortness of breath. Negative for chest tightness.    Cardiovascular:  Negative for chest pain, palpitations and leg swelling.   Gastrointestinal:  Positive for abdominal distention. Negative for abdominal pain.   Endocrine: Negative.    Genitourinary: Negative.    Musculoskeletal: Negative.    Allergic/Immunologic: Negative.    Neurological:  Negative for dizziness and light-headedness.   Hematological: Negative.    Psychiatric/Behavioral: Negative.       Objective:     Vital Signs (Most Recent):  Temp: 97.8 °F (36.6 °C) (10/19/23 0340)  Pulse: 99 (10/19/23 0340)  Resp: 18 (10/19/23 0340)  BP: 110/65 (10/19/23 0340)  SpO2: 99 % (10/19/23 0340) Vital Signs (24h Range):  Temp:  [97.8 °F (36.6 °C)-98.4 °F (36.9 °C)] 97.8 °F (36.6 °C)  Pulse:  [] 99  Resp:  [18-20] 18  SpO2:  [99 %-100 %] 99 %  BP: (106-122)/(65-75) 110/65     Patient Vitals for the past 72 hrs (Last 3 readings):   Weight   10/19/23 0340 77.9 kg (171 lb 12.8 oz)     Body mass index is 23.3 kg/m².    No intake or output data in the 24 hours ending 10/19/23 0538       Physical Exam  Constitutional:       General: He is not in acute distress.     Appearance: Normal appearance. He is not ill-appearing, toxic-appearing or diaphoretic.   HENT:      Head: Normocephalic.      Nose: Nose normal.      Mouth/Throat:      Mouth: Mucous membranes are moist.   Eyes:      Extraocular Movements: Extraocular movements intact.   Cardiovascular:      Rate and Rhythm: Normal rate and regular rhythm.      Pulses: Normal pulses.      Heart sounds: Normal heart sounds. No murmur heard.     No gallop.   Pulmonary:      Effort: Pulmonary effort is normal. No respiratory distress.      Breath sounds: No wheezing or rales.   Abdominal:      General: Abdomen is flat. Bowel sounds are normal. There is no distension.      Tenderness: There is no abdominal tenderness.   Musculoskeletal:         General: No swelling or  "tenderness. Normal range of motion.      Cervical back: Normal range of motion.   Skin:     General: Skin is warm.   Neurological:      Mental Status: He is alert and oriented to person, place, and time. Mental status is at baseline.   Psychiatric:         Mood and Affect: Mood normal.         Behavior: Behavior normal.         Thought Content: Thought content normal.            Significant Labs:  CBC:  Recent Labs   Lab 10/15/23  0431 10/16/23  1147 10/19/23  0106   WBC 3.77* 4.14 5.75   RBC 3.98* 4.06* 3.91*   HGB 12.2* 12.4* 12.0*   HCT 35.8* 36.6* 35.5*    200 186   MCV 90 90 91   MCH 30.7 30.5 30.7   MCHC 34.1 33.9 33.8     BNP:  Recent Labs   Lab 10/19/23  0106   *     CMP:  Recent Labs   Lab 10/15/23  0431 10/16/23  1147 10/19/23  0106   * 161* 174*   CALCIUM 9.1 9.0 9.3   ALBUMIN  --   --  4.0   PROT  --   --  7.5    137 139   K 4.2 4.1 4.2   CO2 22* 21* 22*    106 104   BUN 33* 28* 31*   CREATININE 2.1* 1.9* 2.1*   ALKPHOS  --   --  69   ALT  --   --  20   AST  --   --  20   BILITOT  --   --  0.7      Coagulation:   Recent Labs   Lab 10/14/23  0326 10/15/23  0431 10/16/23  0518   INR 1.0 1.0 1.0   APTT 27.8 27.8 30.5     LDH:  No results for input(s): "LDH" in the last 72 hours.  Microbiology:  Microbiology Results (last 7 days)       ** No results found for the last 168 hours. **            BMP:   Recent Labs   Lab 10/16/23  1147 10/19/23  0106   * 174*    139   K 4.1 4.2    104   CO2 21* 22*   BUN 28* 31*   CREATININE 1.9* 2.1*   CALCIUM 9.0 9.3   MG 2.1  --      Cardiac Markers: No results for input(s): "CKMB", "TROPONINT", "MYOGLOBIN" in the last 72 hours.  Coagulation: No results for input(s): "PT", "INR", "APTT" in the last 72 hours.  I have reviewed all pertinent labs within the past 24 hours.    Diagnostic Results:  Imaging Results              X-Ray Chest AP Portable (In process)                         Assessment/Plan:     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, NYHA class 4  Ischemic Cardiomyopathy  Euvolemic on exam. However given symptoms of PND or orthopnea, suspect he may be carrying fluid.   Home GDMT w/ Entresto, Isordil, Aldactone, Jardiance, (all currently on hold).  RHC findings on 10/13 on  5 as follows: RA: 2 RV: 43/2 PA: 35/16, mean 23 PCWP: 12     Plan  Will restart IV diuretics and adjust as needed.  UOP goal net negative 1.5L    Continue home    Unfortunately out of network for advanced options and will need to be evaluated and treated surgically at outside facility.     Paroxysmal atrial fibrillation  In NSR.   Continue eliquis     Ventricular fibrillation  Hx of Vfib  With ICD discharge last week S/p 48 hour IV amio load at OSH.   Continue 400 PO mg amio BID X3 days then amio 400 qd.     IVÁN (acute kidney injury)  Normal kidney function in march 2022. Unclear if IVÁN due to cardiorenal or new baseline, but improved with addition of inotrope. sCr 1.9 at recent discharge.   Now sCr 2.1   Continue diuresis and adjust as needed.   Continue   Avoid nephrotoxins, renally dose meds    CAD (coronary artery disease)  Continue ASA 81mg and statin. Patient not taking clopidogrel.   Will avoid triple therapy for now given that he is on eliquis for atrial fib.           Samra López MD  Heart Transplant  [facility] - Emergency Dept

## 2023-10-20 ENCOUNTER — TELEPHONE (OUTPATIENT)
Dept: TRANSPLANT | Facility: CLINIC | Age: 63
End: 2023-10-20
Payer: MEDICAID

## 2023-10-20 VITALS
HEART RATE: 95 BPM | SYSTOLIC BLOOD PRESSURE: 112 MMHG | BODY MASS INDEX: 22.84 KG/M2 | RESPIRATION RATE: 16 BRPM | OXYGEN SATURATION: 98 % | TEMPERATURE: 98 F | HEIGHT: 73 IN | DIASTOLIC BLOOD PRESSURE: 72 MMHG | WEIGHT: 172.31 LBS

## 2023-10-20 DIAGNOSIS — I50.43 ACUTE ON CHRONIC COMBINED SYSTOLIC AND DIASTOLIC CHF, NYHA CLASS 4: Primary | ICD-10-CM

## 2023-10-20 LAB
ALBUMIN SERPL BCP-MCNC: 3.3 G/DL (ref 3.5–5.2)
ALP SERPL-CCNC: 68 U/L (ref 55–135)
ALT SERPL W/O P-5'-P-CCNC: 15 U/L (ref 10–44)
ANION GAP SERPL CALC-SCNC: 7 MMOL/L (ref 8–16)
AST SERPL-CCNC: 16 U/L (ref 10–40)
BASOPHILS # BLD AUTO: 0.04 K/UL (ref 0–0.2)
BASOPHILS NFR BLD: 0.9 % (ref 0–1.9)
BILIRUB SERPL-MCNC: 0.8 MG/DL (ref 0.1–1)
BUN SERPL-MCNC: 28 MG/DL (ref 8–23)
CALCIUM SERPL-MCNC: 8.7 MG/DL (ref 8.7–10.5)
CHLORIDE SERPL-SCNC: 107 MMOL/L (ref 95–110)
CO2 SERPL-SCNC: 23 MMOL/L (ref 23–29)
CREAT SERPL-MCNC: 1.7 MG/DL (ref 0.5–1.4)
DIFFERENTIAL METHOD: ABNORMAL
EOSINOPHIL # BLD AUTO: 0.1 K/UL (ref 0–0.5)
EOSINOPHIL NFR BLD: 2.5 % (ref 0–8)
ERYTHROCYTE [DISTWIDTH] IN BLOOD BY AUTOMATED COUNT: 13.6 % (ref 11.5–14.5)
EST. GFR  (NO RACE VARIABLE): 45.3 ML/MIN/1.73 M^2
GLUCOSE SERPL-MCNC: 137 MG/DL (ref 70–110)
HCT VFR BLD AUTO: 31.7 % (ref 40–54)
HGB BLD-MCNC: 10.9 G/DL (ref 14–18)
IMM GRANULOCYTES # BLD AUTO: 0.01 K/UL (ref 0–0.04)
IMM GRANULOCYTES NFR BLD AUTO: 0.2 % (ref 0–0.5)
LYMPHOCYTES # BLD AUTO: 1 K/UL (ref 1–4.8)
LYMPHOCYTES NFR BLD: 23.2 % (ref 18–48)
MAGNESIUM SERPL-MCNC: 2 MG/DL (ref 1.6–2.6)
MCH RBC QN AUTO: 30.4 PG (ref 27–31)
MCHC RBC AUTO-ENTMCNC: 34.4 G/DL (ref 32–36)
MCV RBC AUTO: 89 FL (ref 82–98)
MONOCYTES # BLD AUTO: 0.3 K/UL (ref 0.3–1)
MONOCYTES NFR BLD: 7.7 % (ref 4–15)
NEUTROPHILS # BLD AUTO: 2.9 K/UL (ref 1.8–7.7)
NEUTROPHILS NFR BLD: 65.5 % (ref 38–73)
NRBC BLD-RTO: 0 /100 WBC
PHOSPHATE SERPL-MCNC: 3 MG/DL (ref 2.7–4.5)
PLATELET # BLD AUTO: 189 K/UL (ref 150–450)
PMV BLD AUTO: 11 FL (ref 9.2–12.9)
POTASSIUM SERPL-SCNC: 3.9 MMOL/L (ref 3.5–5.1)
PROT SERPL-MCNC: 6.6 G/DL (ref 6–8.4)
RBC # BLD AUTO: 3.58 M/UL (ref 4.6–6.2)
SODIUM SERPL-SCNC: 137 MMOL/L (ref 136–145)
WBC # BLD AUTO: 4.39 K/UL (ref 3.9–12.7)

## 2023-10-20 PROCEDURE — 99214 OFFICE O/P EST MOD 30 MIN: CPT | Mod: NTX,,, | Performed by: PHYSICIAN ASSISTANT

## 2023-10-20 PROCEDURE — 99214 PR OFFICE/OUTPT VISIT, EST, LEVL IV, 30-39 MIN: ICD-10-PCS | Mod: NTX,,, | Performed by: PHYSICIAN ASSISTANT

## 2023-10-20 PROCEDURE — 94761 N-INVAS EAR/PLS OXIMETRY MLT: CPT | Mod: NTX

## 2023-10-20 PROCEDURE — 84100 ASSAY OF PHOSPHORUS: CPT | Mod: NTX | Performed by: STUDENT IN AN ORGANIZED HEALTH CARE EDUCATION/TRAINING PROGRAM

## 2023-10-20 PROCEDURE — 83735 ASSAY OF MAGNESIUM: CPT | Mod: NTX | Performed by: STUDENT IN AN ORGANIZED HEALTH CARE EDUCATION/TRAINING PROGRAM

## 2023-10-20 PROCEDURE — 85025 COMPLETE CBC W/AUTO DIFF WBC: CPT | Mod: NTX | Performed by: STUDENT IN AN ORGANIZED HEALTH CARE EDUCATION/TRAINING PROGRAM

## 2023-10-20 PROCEDURE — G0378 HOSPITAL OBSERVATION PER HR: HCPCS | Mod: NTX

## 2023-10-20 PROCEDURE — 25000003 PHARM REV CODE 250: Mod: NTX | Performed by: STUDENT IN AN ORGANIZED HEALTH CARE EDUCATION/TRAINING PROGRAM

## 2023-10-20 PROCEDURE — 25000003 PHARM REV CODE 250: Mod: NTX | Performed by: PHYSICIAN ASSISTANT

## 2023-10-20 PROCEDURE — 80053 COMPREHEN METABOLIC PANEL: CPT | Mod: NTX | Performed by: STUDENT IN AN ORGANIZED HEALTH CARE EDUCATION/TRAINING PROGRAM

## 2023-10-20 RX ORDER — FAMOTIDINE 20 MG/1
20 TABLET, FILM COATED ORAL DAILY
Qty: 30 TABLET | Refills: 11 | Status: ON HOLD | OUTPATIENT
Start: 2023-10-21 | End: 2023-11-01 | Stop reason: HOSPADM

## 2023-10-20 RX ORDER — DOBUTAMINE HYDROCHLORIDE 400 MG/100ML
5 INJECTION INTRAVENOUS CONTINUOUS
Status: ON HOLD
Start: 2023-10-20 | End: 2024-01-24 | Stop reason: HOSPADM

## 2023-10-20 RX ORDER — FUROSEMIDE 20 MG/1
20 TABLET ORAL 2 TIMES DAILY
Qty: 60 TABLET | Refills: 11 | Status: ON HOLD | OUTPATIENT
Start: 2023-10-20 | End: 2023-10-31 | Stop reason: HOSPADM

## 2023-10-20 RX ORDER — AMIODARONE HYDROCHLORIDE 400 MG/1
400 TABLET ORAL DAILY
Qty: 30 TABLET | Refills: 11 | Status: SHIPPED | OUTPATIENT
Start: 2023-10-22 | End: 2024-02-01 | Stop reason: SDUPTHER

## 2023-10-20 RX ORDER — POTASSIUM CHLORIDE 1500 MG/1
20 TABLET, EXTENDED RELEASE ORAL ONCE
Qty: 30 TABLET | Refills: 11 | Status: SHIPPED | OUTPATIENT
Start: 2023-10-20 | End: 2023-10-20

## 2023-10-20 RX ADMIN — FAMOTIDINE 20 MG: 20 TABLET ORAL at 09:10

## 2023-10-20 RX ADMIN — AMIODARONE HYDROCHLORIDE 400 MG: 200 TABLET ORAL at 09:10

## 2023-10-20 RX ADMIN — ASPIRIN 81 MG: 81 TABLET, COATED ORAL at 09:10

## 2023-10-20 RX ADMIN — ATORVASTATIN CALCIUM 40 MG: 40 TABLET, FILM COATED ORAL at 09:10

## 2023-10-20 RX ADMIN — APIXABAN 5 MG: 5 TABLET, FILM COATED ORAL at 09:10

## 2023-10-20 NOTE — NURSING
PICC line dressing changed. Pt switched to home dobutamine pump. AVS reviewed with pt and wife. Both verbalized understanding of follow up appts, medications, what to call for, and [facility] on call number. Pt to leave when transport arrives.

## 2023-10-20 NOTE — HOSPITAL COURSE
Mr Abbott was admitted as an overnight observation on 10/19 due to feelings of SOB, orthopnea, abdominal bloating. He was given IVP lasix 40 mg x 1. It was felt that he was euvolemic and at a good dry weight (172 lbs). Watched overnight his sCr improved from 2.1 to 1.7. He was started on famotidine for possible GERD symptoms being perceived as orthopnea. He was discharged on 10/20 on a low dose oral lasix as daily maintenance (20 mg daily) and instructed to take an extra 20 mg if his weight uptrends by >3 lbs in a day. He has a clinic appointment with [facility] on 10/26. GDMT medications being held in meantime to see if kidney function recovers fully. (Unknown baseline of sCr but has been downtrending from 2.8 since he was first admitted last week).

## 2023-10-20 NOTE — PLAN OF CARE
[facility]   Heart Transplant Clinic  [address] [place] [address]   [phone] after hours        HOME  HEALTH ORDERS      Admit to Home Health    Diagnosis:   Patient Active Problem List   Diagnosis    Ventricular tachycardia    CAD (coronary artery disease)    HFrEF (heart failure with reduced ejection fraction)    DM (diabetes mellitus)    Hypokalemia    IVÁN (acute kidney injury)    PAF (paroxysmal atrial fibrillation)    Ventricular fibrillation    Paroxysmal atrial fibrillation    Acute on chronic combined systolic and diastolic CHF, NYHA class 4    Defibrillator discharge       Patient is homebound due to:   Diet: Low Sodium  Acitivities: As Tolerated    Nursing:   SN to complete comprehensive assessment including routine vital signs. Instruct on disease process and s/s of complications to report to MD. Review/verify medication list sent home with the patient at time of discharge  and instruct patient/caregiver as needed. Frequency may be adjusted depending on start of care date.    Notify MD if SBP > 160 or < 90; DBP > 90 or < 50; HR > 120 or < 50; Temp > 101; Weight gain >3lbs in 1 day or 5lbs in 1 week.    LABS:  SN to perform labs: weekly CBC, BMP, Magnesium, BNP    HOME INFUSION THERAPY:    SN to perform Infusion Therapy/Central Line Care.  Review Central Line Care & Central Line Flush with patient.    Administer (drug and dose): Dobutamine, 5 mcg/kg/min, IV continuous, Dosing weight 77.9 kg.     **For questions or concerns, please call [phone] and ask for Pre-Heart transplant clinic, M-F 8-5. After hours, weekends, call [phone] and ask for the Heart Transplant Cardiologist on call.**    Central line care:  Scrub the Hub: Prior to accessing the line, always perform a 30 second alcohol scrub  Each lumen of the central line is to be flushed at least daily with 10 mL Normal Saline and 3 mL Heparin flush (100 units/mL)  Skilled Nurse (SN)  may draw blood from IV access  Blood Draw Procedure:   - Aspirate at least 5 mL of blood   - Discard   - Obtain specimen   - Change posiflow cap   - Flush with 20 mL Normal Saline followed by a                 3-5 mL Heparin flush (100 units/mL)  Central :   - Sterile dressing changes are done weekly and as needed.   - Use chlor-hexadine scrub to cleanse site, apply Biopatch to insertion site,       apply securement device dressing   - Posi-flow caps are changed weekly and after EVERY lab draw.   - If sterile gauze is under dressing to control oozing,                 dressing change must be performed every 24 hours until gauze is not needed.    CONSULTS:      Send initial Home Health orders to HTS attending physician on call.  Send follow up questions to [phone] or fax:               Pre Transplant:   [phone]

## 2023-10-20 NOTE — SUBJECTIVE & OBJECTIVE
Interval History: Patient admitted yesterday feeling increased SOB, abdominal bloating, feelings of orthopnea. Symptomatically feels better now. Got IVP lasix 40 mg x 1 yesterday. Kidney function improved to 1.7. Appears euvolemic. Plan to discharge home today on 20 mg daily of lasix. Has clinic appointment in heart failure clinic on 10/26.    Continuous Infusions:   DOBUTamine IV infusion (non-titrating) 5 mcg/kg/min (10/19/23 0557)     Scheduled Meds:   amiodarone  400 mg Oral BID    [START ON 10/22/2023] amiodarone  400 mg Oral Daily    apixaban  5 mg Oral BID    aspirin  81 mg Oral Daily    atorvastatin  40 mg Oral Daily    famotidine  20 mg Oral Daily     PRN Meds:dextrose 10%, dextrose 10%, glucagon (human recombinant), glucose, glucose, insulin aspart U-100, sodium chloride 0.9%    Review of patient's allergies indicates:  No Known Allergies  Objective:     Vital Signs (Most Recent):  Temp: 98.4 °F (36.9 °C) (10/20/23 1131)  Pulse: 95 (10/20/23 1131)  Resp: 16 (10/20/23 1131)  BP: 112/72 (10/20/23 1131)  SpO2: 98 % (10/20/23 1131) Vital Signs (24h Range):  Temp:  [98.1 °F (36.7 °C)-98.9 °F (37.2 °C)] 98.4 °F (36.9 °C)  Pulse:  [] 95  Resp:  [16-22] 16  SpO2:  [97 %-100 %] 98 %  BP: (105-128)/(59-77) 112/72     Patient Vitals for the past 72 hrs (Last 3 readings):   Weight   10/20/23 0630 78.1 kg (172 lb 4.6 oz)   10/19/23 1506 78.5 kg (173 lb 1 oz)   10/19/23 0917 77.6 kg (171 lb)     Body mass index is 22.73 kg/m².      Intake/Output Summary (Last 24 hours) at 10/20/2023 1202  Last data filed at 10/20/2023 0930  Gross per 24 hour   Intake 740 ml   Output 900 ml   Net -160 ml          Physical Exam  Constitutional:       Appearance: Normal appearance.   HENT:      Head: Normocephalic and atraumatic.   Eyes:      Pupils: Pupils are equal, round, and reactive to light.   Neck:      Vascular: No JVD.      Comments: JVP not elevated.   Cardiovascular:      Rate and Rhythm: Normal rate and regular rhythm.     "  Pulses: Normal pulses.      Heart sounds: Normal heart sounds.   Pulmonary:      Effort: Pulmonary effort is normal.      Breath sounds: Normal breath sounds.   Abdominal:      General: Bowel sounds are normal. There is no distension.      Palpations: Abdomen is soft.      Tenderness: There is no abdominal tenderness.   Musculoskeletal:         General: Normal range of motion.      Cervical back: Normal range of motion and neck supple.      Right lower leg: No edema.      Left lower leg: No edema.   Skin:     General: Skin is warm and dry.      Capillary Refill: Capillary refill takes less than 2 seconds.   Neurological:      General: No focal deficit present.      Mental Status: He is alert and oriented to person, place, and time.   Psychiatric:         Mood and Affect: Mood normal.         Behavior: Behavior normal.            Significant Labs:  CBC:  Recent Labs   Lab 10/19/23  0106 10/19/23  0541 10/20/23  0426   WBC 5.75 5.12 4.39   RBC 3.91* 4.05* 3.58*   HGB 12.0* 12.5* 10.9*   HCT 35.5* 36.7* 31.7*    195 189   MCV 91 91 89   MCH 30.7 30.9 30.4   MCHC 33.8 34.1 34.4     BNP:  Recent Labs   Lab 10/19/23  0106   *     CMP:  Recent Labs   Lab 10/19/23  0106 10/19/23  0541 10/20/23  0426   * 167* 137*   CALCIUM 9.3 9.7 8.7   ALBUMIN 4.0 4.2 3.3*   PROT 7.5 8.2 6.6    138 137   K 4.2 4.3 3.9   CO2 22* 22* 23    104 107   BUN 31* 29* 28*   CREATININE 2.1* 2.1* 1.7*   ALKPHOS 69 81 68   ALT 20 19 15   AST 20 20 16   BILITOT 0.7 0.8 0.8      Coagulation:   Recent Labs   Lab 10/14/23  0326 10/15/23  0431 10/16/23  0518   INR 1.0 1.0 1.0   APTT 27.8 27.8 30.5     LDH:  No results for input(s): "LDH" in the last 72 hours.  Microbiology:  Microbiology Results (last 7 days)       ** No results found for the last 168 hours. **            I have reviewed all pertinent labs within the past 24 hours.    Estimated Creatinine Clearance: 50.5 mL/min (A) (based on SCr of 1.7 mg/dL " (H)).    Diagnostic Results:  I have reviewed all pertinent imaging results/findings within the past 24 hours.

## 2023-10-20 NOTE — PROGRESS NOTES
[facility] - Transplant Stepdown  Heart Transplant  Progress Note    Patient Name: Radha Abbott  MRN: [id number]  Admission Date: 10/19/2023  Hospital Length of Stay: 0 days  Attending Physician: No att. providers found  Primary Care Provider: Vasu Kong MD  Principal Problem:Acute on chronic combined systolic and diastolic CHF, NYHA class 4    Subjective:     Interval History: Patient admitted yesterday feeling increased SOB, abdominal bloating, feelings of orthopnea. Symptomatically feels better now. Got IVP lasix 40 mg x 1 yesterday. Kidney function improved to 1.7. Appears euvolemic. Plan to discharge home today on 20 mg daily of lasix. Has clinic appointment in heart failure clinic on 10/26.    Continuous Infusions:   DOBUTamine IV infusion (non-titrating) 5 mcg/kg/min (10/19/23 0557)     Scheduled Meds:   amiodarone  400 mg Oral BID    [START ON 10/22/2023] amiodarone  400 mg Oral Daily    apixaban  5 mg Oral BID    aspirin  81 mg Oral Daily    atorvastatin  40 mg Oral Daily    famotidine  20 mg Oral Daily     PRN Meds:dextrose 10%, dextrose 10%, glucagon (human recombinant), glucose, glucose, insulin aspart U-100, sodium chloride 0.9%    Review of patient's allergies indicates:  No Known Allergies  Objective:     Vital Signs (Most Recent):  Temp: 98.4 °F (36.9 °C) (10/20/23 1131)  Pulse: 95 (10/20/23 1131)  Resp: 16 (10/20/23 1131)  BP: 112/72 (10/20/23 1131)  SpO2: 98 % (10/20/23 1131) Vital Signs (24h Range):  Temp:  [98.1 °F (36.7 °C)-98.9 °F (37.2 °C)] 98.4 °F (36.9 °C)  Pulse:  [] 95  Resp:  [16-22] 16  SpO2:  [97 %-100 %] 98 %  BP: (105-128)/(59-77) 112/72     Patient Vitals for the past 72 hrs (Last 3 readings):   Weight   10/20/23 0630 78.1 kg (172 lb 4.6 oz)   10/19/23 1506 78.5 kg (173 lb 1 oz)   10/19/23 0917 77.6 kg (171 lb)     Body mass index is 22.73 kg/m².      Intake/Output Summary (Last 24 hours) at 10/20/2023 1202  Last data filed at 10/20/2023 0930  Gross per 24 hour    Intake 740 ml   Output 900 ml   Net -160 ml          Physical Exam  Constitutional:       Appearance: Normal appearance.   HENT:      Head: Normocephalic and atraumatic.   Eyes:      Pupils: Pupils are equal, round, and reactive to light.   Neck:      Vascular: No JVD.      Comments: JVP not elevated.   Cardiovascular:      Rate and Rhythm: Normal rate and regular rhythm.      Pulses: Normal pulses.      Heart sounds: Normal heart sounds.   Pulmonary:      Effort: Pulmonary effort is normal.      Breath sounds: Normal breath sounds.   Abdominal:      General: Bowel sounds are normal. There is no distension.      Palpations: Abdomen is soft.      Tenderness: There is no abdominal tenderness.   Musculoskeletal:         General: Normal range of motion.      Cervical back: Normal range of motion and neck supple.      Right lower leg: No edema.      Left lower leg: No edema.   Skin:     General: Skin is warm and dry.      Capillary Refill: Capillary refill takes less than 2 seconds.   Neurological:      General: No focal deficit present.      Mental Status: He is alert and oriented to person, place, and time.   Psychiatric:         Mood and Affect: Mood normal.         Behavior: Behavior normal.            Significant Labs:  CBC:  Recent Labs   Lab 10/19/23  0106 10/19/23  0541 10/20/23  0426   WBC 5.75 5.12 4.39   RBC 3.91* 4.05* 3.58*   HGB 12.0* 12.5* 10.9*   HCT 35.5* 36.7* 31.7*    195 189   MCV 91 91 89   MCH 30.7 30.9 30.4   MCHC 33.8 34.1 34.4     BNP:  Recent Labs   Lab 10/19/23  0106   *     CMP:  Recent Labs   Lab 10/19/23  0106 10/19/23  0541 10/20/23  0426   * 167* 137*   CALCIUM 9.3 9.7 8.7   ALBUMIN 4.0 4.2 3.3*   PROT 7.5 8.2 6.6    138 137   K 4.2 4.3 3.9   CO2 22* 22* 23    104 107   BUN 31* 29* 28*   CREATININE 2.1* 2.1* 1.7*   ALKPHOS 69 81 68   ALT 20 19 15   AST 20 20 16   BILITOT 0.7 0.8 0.8      Coagulation:   Recent Labs   Lab 10/14/23  0326 10/15/23  0431  "10/16/23  0518   INR 1.0 1.0 1.0   APTT 27.8 27.8 30.5     LDH:  No results for input(s): "LDH" in the last 72 hours.  Microbiology:  Microbiology Results (last 7 days)       ** No results found for the last 168 hours. **            I have reviewed all pertinent labs within the past 24 hours.    Estimated Creatinine Clearance: 50.5 mL/min (A) (based on SCr of 1.7 mg/dL (H)).    Diagnostic Results:  I have reviewed all pertinent imaging results/findings within the past 24 hours.    Assessment and Plan:     Pt is a 62yo M with pmhx of CAD s/p 3v CABG (unclear anatomy, 2009), ICM with a recent EF of 15-20% s/p ICD (medmauricio 2009), DM2 (a1c 7.7), HTN, HLD, Vfib on amio who presents to the ED for dyspnea.     The patient was recently admitted to the [facility] service on 10/12 as a transfer from [facility] for IVÁN and CHF. During this time he was placed on . He got RHC on 10/13 which showed RA: 2 RV: 43/2 PA: 35/16, mean 23 PCWP: 12. Kidney function slowly recovering with holding of diuretics.  His GDMT (entresto, aldactone, jardiance)  and diuretics were held. Due to having [place] med[place] and is out of network and not a candidate to receive advanced therapies at [facility]. He was discharged with  5 with plan to follow up in our clinic in 1 week and search for alternate center to pursue LVAD/ OHTx.     Since his discharge he reports orthopnea and PND. Denies ICD discharge. Denies dyspnea with exertion or bendopnea, chest pain , palpitations, weight changes (baseline weight 171lb), LE edema. He denies increased fluid intake. He feels his abdomen seems bullard. He went to [facility] at first where labs showed , trop 0.059, sCr 2.1 (1.9 on prior discharge). CXR looks clear. Beside US showed CVP approx 3mmHg. He was admitted to the [facility] service for observation.       * Acute on chronic combined systolic and diastolic CHF, NYHA class 4  Ischemic Cardiomyopathy  Euvolemic on exam. However given symptoms of PND or orthopnea, " suspect he may be carrying fluid.   Home GDMT w/ Entresto, Isordil, Aldactone, Jardiance, (all currently on hold).  RHC findings on 10/13 on  5 as follows: RA: 2 RV: 43/2 PA: 35/16, mean 23 PCWP: 12     Plan  20 mg lasix daily. Will discharge home today on this lasix dose. Instructed to take extra 20 mg PRN for weight gain.   UOP goal net negative 1.5L    Continue home    Unfortunately out of network for advanced options and will need to be evaluated and treated surgically at outside facility.     Paroxysmal atrial fibrillation  In NSR.   Continue eliquis     Ventricular fibrillation  Hx of Vfib  With ICD discharge last week S/p 48 hour IV amio load at OSH.   Continue 400 PO mg amio BID X3 days then amio 400 qd.     IVÁN (acute kidney injury)  Normal kidney function in march 2022. Unclear if IVÁN due to cardiorenal or new baseline, but improved with addition of inotrope. sCr 1.9 at recent discharge.   Now sCr 1.7  Continue diuresis and adjust as needed.   Continue   Avoid nephrotoxins, renally dose meds    CAD (coronary artery disease)  Continue ASA 81mg and statin. Patient not taking clopidogrel.   Will avoid triple therapy for now given that he is on eliquis for atrial fib.         Jimy An PA-C  Heart Transplant  [facility] - Transplant Stepdown

## 2023-10-20 NOTE — ASSESSMENT & PLAN NOTE
Normal kidney function in march 2022. Unclear if IVÁN due to cardiorenal or new baseline, but improved with addition of inotrope. sCr 1.9 at recent discharge.   Now sCr 1.7  Continue diuresis and adjust as needed.   Continue   Avoid nephrotoxins, renally dose meds

## 2023-10-20 NOTE — PLAN OF CARE
- Patient is alert and orientedX4.  - Calm, cooperative.  - Ambulatory. Walks up to the bathroom.  - VSS. Afebrile. Spo2 maintained@RA.  - NSR/Bekah Tele.  - Accucheck ANDREWS HS. See POCT. No insulin coverage required this shift.  - Scheduled medicines given as per MAR.  - Inj Dobutamin contd @ 5 mcg/kg/hr with a dosing weight of 77.9 kg.  - EF=15-20% on Echo.  - Urine output low. See previous chart.  - Cr= 2.1.  - Low Na diet.  - Wife at bedside interacting with the patient.  - Bed in low position. Wheels locked. Call light within patient's reach.  - Personal items placed at bedside.  - Patient aware of calling for assistance.  - Will continue to monitor.

## 2023-10-20 NOTE — NURSING
Patient's urine output overnight= 200 ml. Cczvso=480 ml. Inj dobutamin contd@ 5mcg/kg/hr with a dosing weight of 77.9 kg. Informed to the provider, Dinesh. Will continue to monitor.

## 2023-10-20 NOTE — DISCHARGE SUMMARY
[facility] - Transplant Stepdown  Heart Transplant  Discharge Summary      Patient Name: Radha Abbott  MRN: [id number]  Admission Date: 10/19/2023  Hospital Length of Stay: 0 days  Discharge Date and Time: 10/20/2023 1:51 PM  Attending Physician: Chiquita att. providers found   Discharging Provider: Jimy An PA-C  Primary Care Provider: Vasu Kong MD     HPI: Pt is a 64yo M with pmhx of CAD s/p 3v CABG (unclear anatomy, 2009), ICM with a recent EF of 15-20% s/p ICD (medtronik 2009), DM2 (a1c 7.7), HTN, HLD, Vfib on amio who presents to the ED for dyspnea.     The patient was recently admitted to the [facility] service on 10/12 as a transfer from [facility] for IVÁN and CHF. During this time he was placed on . He got RHC on 10/13 which showed RA: 2 RV: 43/2 PA: 35/16, mean 23 PCWP: 12. Kidney function slowly recovering with holding of diuretics.  His GDMT (entresto, aldactone, jardiance)  and diuretics were held. Due to having [employer] med[place] and is out of network and not a candidate to receive advanced therapies at [facility]. He was discharged with  5 with plan to follow up in our clinic in 1 week and search for alternate center to pursue LVAD/ OHTx.     Since his discharge he reports orthopnea and PND. Denies ICD discharge. Denies dyspnea with exertion or bendopnea, chest pain , palpitations, weight changes (baseline weight 171lb), LE edema. He denies increased fluid intake. He feels his abdomen seems bullard. He went to [facility] at first where labs showed , trop 0.059, sCr 2.1 (1.9 on prior discharge). CXR looks clear. Beside US showed CVP approx 3mmHg. He was admitted to the [facility] service for observation.       * No surgery found *     Hospital Course: Mr Abbott was admitted as an overnight observation on 10/19 due to feelings of SOB, orthopnea, abdominal bloating. He was given IVP lasix 40 mg x 1. It was felt that he was euvolemic and at a good dry weight (172 lbs). Watched overnight his sCr improved from 2.1  to 1.7. He was started on famotidine for possible GERD symptoms being perceived as orthopnea. He was discharged on 10/20 on a low dose oral lasix as daily maintenance (20 mg daily) and instructed to take an extra 20 mg if his weight uptrends by >3 lbs in a day. He has a clinic appointment with [facility] on 10/26. GDMT medications being held in meantime to see if kidney function recovers fully. (Unknown baseline of sCr but has been downtrending from 2.8 since he was first admitted last week).       Goals of Care Treatment Preferences:  Code Status: Full Code          Significant Diagnostic Studies: Labs: All labs within the past 24 hours have been reviewed    Pending Diagnostic Studies:     None        Final Active Diagnoses:    Diagnosis Date Noted POA    PRINCIPAL PROBLEM:  Acute on chronic combined systolic and diastolic CHF, NYHA class 4 [I50.43] 10/17/2023 Yes    Paroxysmal atrial fibrillation [I48.0] 10/15/2023 Yes    Ventricular fibrillation [I49.01] 10/12/2023 Yes    IVÁN (acute kidney injury) [N17.9] 10/08/2023 Yes    CAD (coronary artery disease) [I25.10] 10/07/2023 Yes      Problems Resolved During this Admission:      Discharged Condition: stable    Disposition: Home or Self Care    Follow Up:    Patient Instructions:   No discharge procedures on file.  Medications:  Reconciled Home Medications:      Medication List      START taking these medications    famotidine 20 MG tablet  Commonly known as: PEPCID  Take 1 tablet (20 mg total) by mouth once daily.  Start taking on: October 21, 2023     potassium chloride 20 mEq  Commonly known as: K-TAB  Take 1 tablet (20 mEq total) by mouth once. for 1 dose        CHANGE how you take these medications    amiodarone 400 MG tablet  Commonly known as: PACERONE  Take 1 tablet (400 mg total) by mouth once daily.  Start taking on: October 22, 2023  What changed:   · medication strength  · See the new instructions.  · These instructions start on October 22, 2023. If you are  unsure what to do until then, ask your doctor or other care provider.     DOBUTamine 1,000 mg/250 mL (4,000 mcg/mL) infusion  Commonly known as: DOBUTREX  Inject 389.5 mcg/min into the vein continuous.  What changed: how much to take     furosemide 20 MG tablet  Commonly known as: LASIX  Take 1 tablet (20 mg total) by mouth 2 (two) times daily. Take an extra 20 mg if weight gain of >3 lbs in a day or >5 lbs in a week.  What changed:   · medication strength  · how much to take  · when to take this  · reasons to take this  · additional instructions        CONTINUE taking these medications    aspirin 81 MG EC tablet  Commonly known as: ECOTRIN  Take 81 mg by mouth once daily.     atorvastatin 40 MG tablet  Commonly known as: LIPITOR  Take 40 mg by mouth.     ELIQUIS 5 mg Tab  Generic drug: apixaban  Take 1 tablet (5 mg total) by mouth 2 (two) times daily.     fish oil-omega-3 fatty acids 300-1,000 mg capsule  Take 2 g by mouth once daily.     gabapentin 300 MG capsule  Commonly known as: NEURONTIN  Take 300 mg by mouth nightly as needed.     multivitamin capsule  Take 1 capsule by mouth once daily.            Jimy An PA-C  Heart Transplant  [facility] - Transplant Stepdown

## 2023-10-20 NOTE — TELEPHONE ENCOUNTER
Care of patient is being transferred to CHF section from Preht section, per Dr. Sajan Casas.    Dx:  Reason:does not meet advance options criteria   Pt is on home inotrope therapy.  Med/Dose:Dobutamine @ 5mcg/kg/hr  Infusion Company:[employer] [phone]   agency:[facility]   Phone:[phone]  Lab Schedule:  Labs ordered:CBC, BMP, Magnesium,BNP   RTC 10/26/23

## 2023-10-20 NOTE — ASSESSMENT & PLAN NOTE
Ischemic Cardiomyopathy  Euvolemic on exam. However given symptoms of PND or orthopnea, suspect he may be carrying fluid.   Home GDMT w/ Entresto, Isordil, Aldactone, Jardiance, (all currently on hold).  RHC findings on 10/13 on  5 as follows: RA: 2 RV: 43/2 PA: 35/16, mean 23 PCWP: 12     Plan  20 mg lasix daily. Will discharge home today on this lasix dose. Instructed to take extra 20 mg PRN for weight gain.   UOP goal net negative 1.5L    Continue home    Unfortunately out of network for advanced options and will need to be evaluated and treated surgically at outside facility.

## 2023-10-23 ENCOUNTER — LAB VISIT (OUTPATIENT)
Dept: LAB | Facility: HOSPITAL | Age: 63
End: 2023-10-23
Attending: INTERNAL MEDICINE
Payer: MEDICAID

## 2023-10-23 DIAGNOSIS — I47.20 VENTRICULAR TACHYARRHYTHMIA: ICD-10-CM

## 2023-10-23 DIAGNOSIS — I50.43 ACUTE ON CHRONIC COMBINED SYSTOLIC AND DIASTOLIC CHF, NYHA CLASS 4: Primary | ICD-10-CM

## 2023-10-23 DIAGNOSIS — I50.22 CHRONIC SYSTOLIC HEART FAILURE: Primary | ICD-10-CM

## 2023-10-23 DIAGNOSIS — D64.9 ANEMIA, UNSPECIFIED: ICD-10-CM

## 2023-10-23 LAB
ANION GAP SERPL CALC-SCNC: 14 MMOL/L (ref 8–16)
BASOPHILS # BLD AUTO: 0.03 K/UL (ref 0–0.2)
BASOPHILS NFR BLD: 0.8 % (ref 0–1.9)
BNP SERPL-MCNC: 911 PG/ML (ref 0–99)
BUN SERPL-MCNC: 35 MG/DL (ref 8–23)
CALCIUM SERPL-MCNC: 8.5 MG/DL (ref 8.7–10.5)
CHLORIDE SERPL-SCNC: 104 MMOL/L (ref 95–110)
CO2 SERPL-SCNC: 20 MMOL/L (ref 23–29)
CREAT SERPL-MCNC: 2 MG/DL (ref 0.5–1.4)
DIFFERENTIAL METHOD: ABNORMAL
EOSINOPHIL # BLD AUTO: 0.1 K/UL (ref 0–0.5)
EOSINOPHIL NFR BLD: 1.9 % (ref 0–8)
ERYTHROCYTE [DISTWIDTH] IN BLOOD BY AUTOMATED COUNT: 13.6 % (ref 11.5–14.5)
EST. GFR  (NO RACE VARIABLE): 37 ML/MIN/1.73 M^2
GLUCOSE SERPL-MCNC: 192 MG/DL (ref 70–110)
HCT VFR BLD AUTO: 31.2 % (ref 40–54)
HGB BLD-MCNC: 10.4 G/DL (ref 14–18)
IMM GRANULOCYTES # BLD AUTO: 0.01 K/UL (ref 0–0.04)
IMM GRANULOCYTES NFR BLD AUTO: 0.3 % (ref 0–0.5)
LYMPHOCYTES # BLD AUTO: 0.8 K/UL (ref 1–4.8)
LYMPHOCYTES NFR BLD: 21.1 % (ref 18–48)
MAGNESIUM SERPL-MCNC: 2.1 MG/DL (ref 1.6–2.6)
MCH RBC QN AUTO: 30.3 PG (ref 27–31)
MCHC RBC AUTO-ENTMCNC: 33.3 G/DL (ref 32–36)
MCV RBC AUTO: 91 FL (ref 82–98)
MONOCYTES # BLD AUTO: 0.2 K/UL (ref 0.3–1)
MONOCYTES NFR BLD: 6 % (ref 4–15)
NEUTROPHILS # BLD AUTO: 2.6 K/UL (ref 1.8–7.7)
NEUTROPHILS NFR BLD: 69.9 % (ref 38–73)
NRBC BLD-RTO: 0 /100 WBC
PLATELET # BLD AUTO: 211 K/UL (ref 150–450)
PMV BLD AUTO: 11.1 FL (ref 9.2–12.9)
POTASSIUM SERPL-SCNC: 4.1 MMOL/L (ref 3.5–5.1)
RBC # BLD AUTO: 3.43 M/UL (ref 4.6–6.2)
SODIUM SERPL-SCNC: 138 MMOL/L (ref 136–145)
WBC # BLD AUTO: 3.65 K/UL (ref 3.9–12.7)

## 2023-10-23 PROCEDURE — 83880 ASSAY OF NATRIURETIC PEPTIDE: CPT | Performed by: PHYSICIAN ASSISTANT

## 2023-10-23 PROCEDURE — 80048 BASIC METABOLIC PNL TOTAL CA: CPT | Performed by: PHYSICIAN ASSISTANT

## 2023-10-23 PROCEDURE — 83735 ASSAY OF MAGNESIUM: CPT | Performed by: PHYSICIAN ASSISTANT

## 2023-10-23 PROCEDURE — 85025 COMPLETE CBC W/AUTO DIFF WBC: CPT | Performed by: PHYSICIAN ASSISTANT

## 2023-10-23 PROCEDURE — 36415 COLL VENOUS BLD VENIPUNCTURE: CPT | Performed by: PHYSICIAN ASSISTANT

## 2023-10-23 RX ORDER — POTASSIUM CHLORIDE 20 MEQ/1
20 TABLET, EXTENDED RELEASE ORAL DAILY
Qty: 30 TABLET | Refills: 11 | Status: ON HOLD | OUTPATIENT
Start: 2023-10-23 | End: 2024-01-24 | Stop reason: HOSPADM

## 2023-10-24 ENCOUNTER — DOCUMENTATION ONLY (OUTPATIENT)
Dept: TRANSPLANT | Facility: CLINIC | Age: 63
End: 2023-10-24
Payer: MEDICAID

## 2023-10-25 ENCOUNTER — TELEPHONE (OUTPATIENT)
Dept: TRANSPLANT | Facility: CLINIC | Age: 63
End: 2023-10-25
Payer: MEDICAID

## 2023-10-25 NOTE — TELEPHONE ENCOUNTER
10/25/2023    Weekly inotrope labs (CBC, CMP, BNP & Mg) received, reviewed and present in Epic.  Establishing patient trends; completed a full phone assessment with patient and spouse.     WBC:  3.65 - patient trend 3.20-5.75 in October   Hgb:  10.4 - patient trend 10.4-14.4 in October  HCT:  31.2 - patient trend 31.2-39.3 in October  Platelets:  211    Na+:  138  K+:  4.1  BUN:  35 - patient trend 28-43 in October  CR:  2.0 -patient trend 1.7-2.8 in October  M.1  BNP:  911 - patient trend 641-911 in October    Spoke with patient and spouse via phone.  Patient states that he continues to have SOB and require periods of rest with activity.  Spouse reports increased swelling in BLE right foot > than left; periods of rest and elevation decrease swelling.  Weight this AM reported at 174.0 lbs; weight when D/C from hospital was 172 lbs, established as dry weight per D/C summary  BP this AM is 118/75.  Patient and spouse confirm the below medications and doses:    -Amiodarone 400 mg PO daily  -Aspirin 81 mg PO daily  -Lasix 20 mg PO BID and PRN - patient states that the last PRN dose taken was 12 pm yesterday; Lasix 60 mg taken 10/24/2023, 3 doses  -Lipitor 40 mg PO daily  -Potassium 20 meq PO daily    Educated patient and spouse on the four clinical members of our nursing and MA team, communicated clinic contact number for a direct link to clinical staff.  Educated patient and spouse on the reasons to call clinic; increased or worsening SOB, edema in BLE or abd, a weight gain of > 3lbs in a day or > 5lbs in a week.  Patient and spouse voice understanding.    Confirmed with patient and spouse the hospital F/U appointment with labs scheduled 10/26/2023; patient aware of repeat labs this week for appointment.  Discussed with patient and spouse that KEARA Waldrop MA has spoken with [facility] to confirm lab draws and dressing changes every Monday.    Forwarded to CHANG Riley NP for awareness prior to RTC.

## 2023-10-25 NOTE — TELEPHONE ENCOUNTER
10/24/2023    Received call from spouse via the call center; patient having increased SOB this afternoon.  Spouse reports patient has just taken his afternoon lasix and would like to wait until F/U appointment tomorrow 10/25/2023.      Patient states that he feels worse then he did before most recent hospitalization; unable to complete more than a sentence or two without increased WOB.  Discussed with spouse reasons to seek emergent/urgent care or call 911 for the quickest evaluation and treatment,  in an emergency room close to the patients home.   Discussed safety concerns for bypassing closest emergency room to report to main campus emergency room if spouse chooses to drive patient to emergency room with continued or worsening SOB.  Spouse voices understanding.    Spouse states that she will give her  a little time to see if last oral dose of medication starts to lessen work of breathing and then will plan to drive to the closest emergency room to the patients home in [place].  Before ending call, discussed with spouse the medical emergency SOB ca quickly turn into and reiterated the reasons to seek emergent/urgent care within their community or call 911.  Spouse voices understanding.      Alerted CHANG Riley NP for awareness

## 2023-10-26 ENCOUNTER — OFFICE VISIT (OUTPATIENT)
Dept: TRANSPLANT | Facility: CLINIC | Age: 63
DRG: 291 | End: 2023-10-26
Payer: MEDICAID

## 2023-10-26 ENCOUNTER — HOSPITAL ENCOUNTER (INPATIENT)
Facility: HOSPITAL | Age: 63
LOS: 6 days | Discharge: HOME | DRG: 291 | End: 2023-11-01
Attending: INTERNAL MEDICINE | Admitting: INTERNAL MEDICINE
Payer: MEDICAID

## 2023-10-26 VITALS
BODY MASS INDEX: 24.48 KG/M2 | HEIGHT: 72 IN | OXYGEN SATURATION: 96 % | DIASTOLIC BLOOD PRESSURE: 76 MMHG | HEART RATE: 88 BPM | SYSTOLIC BLOOD PRESSURE: 111 MMHG | WEIGHT: 180.75 LBS

## 2023-10-26 DIAGNOSIS — I50.43 ACUTE ON CHRONIC COMBINED SYSTOLIC AND DIASTOLIC CONGESTIVE HEART FAILURE: ICD-10-CM

## 2023-10-26 DIAGNOSIS — I48.0 PAF (PAROXYSMAL ATRIAL FIBRILLATION): ICD-10-CM

## 2023-10-26 DIAGNOSIS — M25.519 SHOULDER PAIN, UNSPECIFIED CHRONICITY, UNSPECIFIED LATERALITY: ICD-10-CM

## 2023-10-26 DIAGNOSIS — N17.9 AKI (ACUTE KIDNEY INJURY): ICD-10-CM

## 2023-10-26 DIAGNOSIS — Z91.89 AT RISK FOR STRESS ULCER: ICD-10-CM

## 2023-10-26 DIAGNOSIS — I50.43 ACUTE ON CHRONIC COMBINED SYSTOLIC AND DIASTOLIC CHF, NYHA CLASS 4: Primary | ICD-10-CM

## 2023-10-26 DIAGNOSIS — Z79.899 RECEIVING INOTROPIC MEDICATION: ICD-10-CM

## 2023-10-26 LAB
LACTATE SERPL-SCNC: 1 MMOL/L (ref 0.5–2.2)
POCT GLUCOSE: 188 MG/DL (ref 70–110)

## 2023-10-26 PROCEDURE — 3051F PR MOST RECENT HEMOGLOBIN A1C LEVEL 7.0 - < 8.0%: ICD-10-PCS | Mod: CPTII,,,

## 2023-10-26 PROCEDURE — 3008F PR BODY MASS INDEX (BMI) DOCUMENTED: ICD-10-PCS | Mod: CPTII,,,

## 2023-10-26 PROCEDURE — 25000003 PHARM REV CODE 250: Performed by: STUDENT IN AN ORGANIZED HEALTH CARE EDUCATION/TRAINING PROGRAM

## 2023-10-26 PROCEDURE — 99213 OFFICE O/P EST LOW 20 MIN: CPT | Mod: PBBFAC,25

## 2023-10-26 PROCEDURE — 99999 PR PBB SHADOW E&M-EST. PATIENT-LVL III: CPT | Mod: PBBFAC,,,

## 2023-10-26 PROCEDURE — 99214 PR OFFICE/OUTPT VISIT, EST, LEVL IV, 30-39 MIN: ICD-10-PCS | Mod: S$PBB,,,

## 2023-10-26 PROCEDURE — 99214 OFFICE O/P EST MOD 30 MIN: CPT | Mod: S$PBB,,,

## 2023-10-26 PROCEDURE — 99223 1ST HOSP IP/OBS HIGH 75: CPT | Mod: ,,, | Performed by: INTERNAL MEDICINE

## 2023-10-26 PROCEDURE — 3051F HG A1C>EQUAL 7.0%<8.0%: CPT | Mod: CPTII,,,

## 2023-10-26 PROCEDURE — 3074F SYST BP LT 130 MM HG: CPT | Mod: CPTII,,,

## 2023-10-26 PROCEDURE — 20600001 HC STEP DOWN PRIVATE ROOM

## 2023-10-26 PROCEDURE — 3008F BODY MASS INDEX DOCD: CPT | Mod: CPTII,,,

## 2023-10-26 PROCEDURE — 1111F DSCHRG MED/CURRENT MED MERGE: CPT | Mod: CPTII,,,

## 2023-10-26 PROCEDURE — 3074F PR MOST RECENT SYSTOLIC BLOOD PRESSURE < 130 MM HG: ICD-10-PCS | Mod: CPTII,,,

## 2023-10-26 PROCEDURE — 83605 ASSAY OF LACTIC ACID: CPT | Performed by: STUDENT IN AN ORGANIZED HEALTH CARE EDUCATION/TRAINING PROGRAM

## 2023-10-26 PROCEDURE — 25000003 PHARM REV CODE 250: Performed by: INTERNAL MEDICINE

## 2023-10-26 PROCEDURE — 1159F MED LIST DOCD IN RCRD: CPT | Mod: CPTII,,,

## 2023-10-26 PROCEDURE — 3078F DIAST BP <80 MM HG: CPT | Mod: CPTII,,,

## 2023-10-26 PROCEDURE — 99223 PR INITIAL HOSPITAL CARE,LEVL III: ICD-10-PCS | Mod: ,,, | Performed by: INTERNAL MEDICINE

## 2023-10-26 PROCEDURE — 1159F PR MEDICATION LIST DOCUMENTED IN MEDICAL RECORD: ICD-10-PCS | Mod: CPTII,,,

## 2023-10-26 PROCEDURE — 3078F PR MOST RECENT DIASTOLIC BLOOD PRESSURE < 80 MM HG: ICD-10-PCS | Mod: CPTII,,,

## 2023-10-26 PROCEDURE — 99999 PR PBB SHADOW E&M-EST. PATIENT-LVL III: ICD-10-PCS | Mod: PBBFAC,,,

## 2023-10-26 PROCEDURE — 1111F PR DISCHARGE MEDS RECONCILED W/ CURRENT OUTPATIENT MED LIST: ICD-10-PCS | Mod: CPTII,,,

## 2023-10-26 PROCEDURE — 63600175 PHARM REV CODE 636 W HCPCS: Performed by: STUDENT IN AN ORGANIZED HEALTH CARE EDUCATION/TRAINING PROGRAM

## 2023-10-26 RX ORDER — ASPIRIN 81 MG/1
81 TABLET ORAL DAILY
Status: DISCONTINUED | OUTPATIENT
Start: 2023-10-27 | End: 2023-11-01 | Stop reason: HOSPADM

## 2023-10-26 RX ORDER — AMIODARONE HYDROCHLORIDE 200 MG/1
400 TABLET ORAL DAILY
Status: DISCONTINUED | OUTPATIENT
Start: 2023-10-27 | End: 2023-11-01 | Stop reason: HOSPADM

## 2023-10-26 RX ORDER — ATORVASTATIN CALCIUM 40 MG/1
40 TABLET, FILM COATED ORAL NIGHTLY
Status: DISCONTINUED | OUTPATIENT
Start: 2023-10-26 | End: 2023-11-01 | Stop reason: HOSPADM

## 2023-10-26 RX ORDER — GABAPENTIN 300 MG/1
300 CAPSULE ORAL NIGHTLY PRN
Status: DISCONTINUED | OUTPATIENT
Start: 2023-10-26 | End: 2023-11-01 | Stop reason: HOSPADM

## 2023-10-26 RX ORDER — SODIUM CHLORIDE 0.9 % (FLUSH) 0.9 %
10 SYRINGE (ML) INJECTION
Status: DISCONTINUED | OUTPATIENT
Start: 2023-10-26 | End: 2023-11-01 | Stop reason: HOSPADM

## 2023-10-26 RX ORDER — DOBUTAMINE HYDROCHLORIDE 400 MG/100ML
5 INJECTION INTRAVENOUS CONTINUOUS
Status: DISCONTINUED | OUTPATIENT
Start: 2023-10-26 | End: 2023-11-01 | Stop reason: HOSPADM

## 2023-10-26 RX ORDER — ACETAMINOPHEN 325 MG/1
650 TABLET ORAL EVERY 6 HOURS PRN
Status: DISCONTINUED | OUTPATIENT
Start: 2023-10-26 | End: 2023-11-01 | Stop reason: HOSPADM

## 2023-10-26 RX ORDER — FUROSEMIDE 10 MG/ML
80 INJECTION INTRAMUSCULAR; INTRAVENOUS ONCE
Status: COMPLETED | OUTPATIENT
Start: 2023-10-26 | End: 2023-10-26

## 2023-10-26 RX ORDER — FAMOTIDINE 20 MG/1
20 TABLET, FILM COATED ORAL DAILY
Status: DISCONTINUED | OUTPATIENT
Start: 2023-10-27 | End: 2023-10-27

## 2023-10-26 RX ADMIN — DOBUTAMINE HYDROCHLORIDE 5 MCG/KG/MIN: 400 INJECTION INTRAVENOUS at 08:10

## 2023-10-26 RX ADMIN — APIXABAN 5 MG: 5 TABLET, FILM COATED ORAL at 10:10

## 2023-10-26 RX ADMIN — ACETAMINOPHEN 650 MG: 325 TABLET ORAL at 10:10

## 2023-10-26 RX ADMIN — FUROSEMIDE 80 MG: 10 INJECTION, SOLUTION INTRAVENOUS at 08:10

## 2023-10-26 RX ADMIN — ATORVASTATIN CALCIUM 40 MG: 40 TABLET, FILM COATED ORAL at 10:10

## 2023-10-26 RX ADMIN — FUROSEMIDE 10 MG/HR: 10 INJECTION, SOLUTION INTRAMUSCULAR; INTRAVENOUS at 08:10

## 2023-10-26 NOTE — HPI
63 year old male with hx of CAD s/p 3v CABG (unclear anatomy, 2009), ICM with a recent EF of 15-20% s/p ICD (medmauricio 2009), DM2 (a1c 7.7), HTN, HLD, Vfib on amio who presented as a direct admission from HTS/PH clinic for worsening shortness of breath.      The patient was recently admitted to the [facility] service on 10/12 as a transfer from [facility] for IVÁN and CHF. During this time he was placed on . He got RHC on 10/13 which showed RA: 2 RV: 43/2 PA: 35/16, mean 23 PCWP: 12. Kidney function slowly recovering with holding of diuretics.  His GDMT (entresto, aldactone, jardiance) and diuretics were held at the time. Patient was found to not be a candidate for advanced therapies (due to insurance) here at [facility] and was discharged home on  5 with plan to follow up in clinic in order to help find an alternative center for OHTx/VAD. He had a subsequent admission on 10/19 with worsening dyspnea, orthopnea and PND. Was observed overnight and diuresed prior to discharge the following day. Patient presented today in clinic, and was noted to be short of breath to a point he was unable to complete his sentences in clinic.

## 2023-10-26 NOTE — SUBJECTIVE & OBJECTIVE
Past Medical History:   Diagnosis Date    CAD (coronary artery disease)     Diabetes mellitus     HFrEF (heart failure with reduced ejection fraction)     ICD (implantable cardioverter-defibrillator) in place     MI, old        Past Surgical History:   Procedure Laterality Date    CARDIAC SURGERY      RIGHT HEART CATHETERIZATION Right 10/10/2023    Procedure: INSERTION, CATHETER, RIGHT HEART;  Surgeon: Bin Gandhi MD;  Location: [facility] CATH LAB;  Service: Cardiology;  Laterality: Right;    RIGHT HEART CATHETERIZATION Right 10/13/2023    Procedure: INSERTION, CATHETER, RIGHT HEART;  Surgeon: Walter Mcintyre MD;  Location: [facility] CATH LAB;  Service: Cardiology;  Laterality: Right;       Review of patient's allergies indicates:  No Known Allergies    No current facility-administered medications on file prior to encounter.     Current Outpatient Medications on File Prior to Encounter   Medication Sig    amiodarone (PACERONE) 400 MG tablet Take 1 tablet (400 mg total) by mouth once daily.    apixaban (ELIQUIS) 5 mg Tab Take 1 tablet (5 mg total) by mouth 2 (two) times daily.    aspirin (ECOTRIN) 81 MG EC tablet Take 81 mg by mouth once daily.    atorvastatin (LIPITOR) 40 MG tablet Take 40 mg by mouth.    DOBUTamine (DOBUTREX) 1,000 mg/250 mL (4,000 mcg/mL) infusion Inject 389.5 mcg/min into the vein continuous.    famotidine (PEPCID) 20 MG tablet Take 1 tablet (20 mg total) by mouth once daily.    fish oil-omega-3 fatty acids 300-1,000 mg capsule Take 2 g by mouth once daily.    furosemide (LASIX) 20 MG tablet Take 1 tablet (20 mg total) by mouth 2 (two) times daily. Take an extra 20 mg if weight gain of >3 lbs in a day or >5 lbs in a week.    gabapentin (NEURONTIN) 300 MG capsule Take 300 mg by mouth nightly as needed.    multivitamin capsule Take 1 capsule by mouth once daily.    potassium chloride SA (K-DUR,KLOR-CON) 20 MEQ tablet Take 1 tablet (20 mEq total) by mouth once daily.     Family History    None        Tobacco Use    Smoking status: Every Day     Current packs/day: 0.50     Types: Cigarettes    Smokeless tobacco: Not on file   Substance and Sexual Activity    Alcohol use: Yes     Comment: rarely    Drug use: No    Sexual activity: Not on file       Objective:     Vital Signs (Most Recent):  Temp: 98 °F (36.7 °C) (10/26/23 1814)  Pulse: 92 (10/26/23 1814)  Resp: 18 (10/26/23 1814)  BP: 120/75 (10/26/23 1814)  SpO2: 96 % (10/26/23 1814) Vital Signs (24h Range):  Temp:  [98 °F (36.7 °C)] 98 °F (36.7 °C)  Pulse:  [88-92] 92  Resp:  [18] 18  SpO2:  [96 %] 96 %  BP: (111-120)/(75-76) 120/75        There is no height or weight on file to calculate BMI.    SpO2: 96 %       No intake or output data in the 24 hours ending 10/26/23 1835    Lines/Drains/Airways       Peripherally Inserted Central Catheter Line  Duration             PICC Double Lumen 10/13/23 1542 right basilic 13 days                     Physical Exam  Vitals and nursing note reviewed.   Constitutional:       Appearance: Normal appearance.   HENT:      Head: Atraumatic.   Cardiovascular:      Rate and Rhythm: Normal rate.      Heart sounds: No murmur heard.  Pulmonary:      Effort: Pulmonary effort is normal.      Breath sounds: Normal breath sounds.   Abdominal:      General: There is distension.      Palpations: Abdomen is soft.   Musculoskeletal:      Right lower leg: Edema present.      Left lower leg: No edema.   Skin:     General: Skin is warm.          Significant Labs: All pertinent lab results from the last 24 hours have been reviewed.

## 2023-10-26 NOTE — SUBJECTIVE & OBJECTIVE
Past Medical History:   Diagnosis Date    CAD (coronary artery disease)     Diabetes mellitus     HFrEF (heart failure with reduced ejection fraction)     ICD (implantable cardioverter-defibrillator) in place     MI, old        Past Surgical History:   Procedure Laterality Date    CARDIAC SURGERY      RIGHT HEART CATHETERIZATION Right 10/10/2023    Procedure: INSERTION, CATHETER, RIGHT HEART;  Surgeon: Bin Gandhi MD;  Location: [facility] CATH LAB;  Service: Cardiology;  Laterality: Right;    RIGHT HEART CATHETERIZATION Right 10/13/2023    Procedure: INSERTION, CATHETER, RIGHT HEART;  Surgeon: Walter Mcintyre MD;  Location: [facility] CATH LAB;  Service: Cardiology;  Laterality: Right;       Review of patient's allergies indicates:  No Known Allergies    No current facility-administered medications on file prior to encounter.     Current Outpatient Medications on File Prior to Encounter   Medication Sig    amiodarone (PACERONE) 400 MG tablet Take 1 tablet (400 mg total) by mouth once daily.    apixaban (ELIQUIS) 5 mg Tab Take 1 tablet (5 mg total) by mouth 2 (two) times daily.    aspirin (ECOTRIN) 81 MG EC tablet Take 81 mg by mouth once daily.    atorvastatin (LIPITOR) 40 MG tablet Take 40 mg by mouth.    DOBUTamine (DOBUTREX) 1,000 mg/250 mL (4,000 mcg/mL) infusion Inject 389.5 mcg/min into the vein continuous.    famotidine (PEPCID) 20 MG tablet Take 1 tablet (20 mg total) by mouth once daily.    fish oil-omega-3 fatty acids 300-1,000 mg capsule Take 2 g by mouth once daily.    furosemide (LASIX) 20 MG tablet Take 1 tablet (20 mg total) by mouth 2 (two) times daily. Take an extra 20 mg if weight gain of >3 lbs in a day or >5 lbs in a week.    gabapentin (NEURONTIN) 300 MG capsule Take 300 mg by mouth nightly as needed.    multivitamin capsule Take 1 capsule by mouth once daily.    potassium chloride SA (K-DUR,KLOR-CON) 20 MEQ tablet Take 1 tablet (20 mEq total) by mouth once daily.     Family History    None        Tobacco Use    Smoking status: Every Day     Current packs/day: 0.50     Types: Cigarettes    Smokeless tobacco: Not on file   Substance and Sexual Activity    Alcohol use: Yes     Comment: rarely    Drug use: No    Sexual activity: Not on file       Vital Signs (Most Recent):  Temp: 98 °F (36.7 °C) (10/26/23 1814)  Pulse: 92 (10/26/23 1814)  Resp: 18 (10/26/23 1814)  BP: 120/75 (10/26/23 1814)  SpO2: 96 % (10/26/23 1814) Vital Signs (24h Range):  Temp:  [98 °F (36.7 °C)] 98 °F (36.7 °C)  Pulse:  [88-92] 92  Resp:  [18] 18  SpO2:  [96 %] 96 %  BP: (111-120)/(75-76) 120/75        There is no height or weight on file to calculate BMI.    SpO2: 96 %       No intake or output data in the 24 hours ending 10/26/23 1854    Lines/Drains/Airways       Peripherally Inserted Central Catheter Line  Duration             PICC Double Lumen 10/13/23 1542 right basilic 13 days                     Physical Exam  Vitals and nursing note reviewed.   Constitutional:       Appearance: Normal appearance.   Eyes:      Conjunctiva/sclera: Conjunctivae normal.   Cardiovascular:      Rate and Rhythm: Normal rate and regular rhythm.   Pulmonary:      Effort: Pulmonary effort is normal.      Breath sounds: Normal breath sounds. No wheezing.      Comments: Course breath sounds  Abdominal:      General: There is distension.      Palpations: Abdomen is soft.   Musculoskeletal:      Right lower leg: Edema present.      Left lower leg: Edema present.   Skin:     General: Skin is warm.   Neurological:      General: No focal deficit present.      Mental Status: He is alert.          Significant Labs: All pertinent lab results from the last 24 hours have been reviewed.

## 2023-10-26 NOTE — H&P
[facility] - Cardiology Stepdown  Cardiology  History and Physical     Patient Name: Radha Abbott  MRN: [id number]  Admission Date: 10/26/2023  Code Status: Full Code   Attending Provider: Kendrick Galindo MD   Primary Care Physician: Vasu Kong MD  Principal Problem:<principal problem not specified>    Patient information was obtained from patient, past medical records and ER records.     Subjective:     Chief Complaint:       HPI:  63 year old male with hx of CAD s/p 3v CABG (unclear anatomy, 2009), ICM with a recent EF of 15-20% s/p ICD (medtronik 2009), DM2 (a1c 7.7), HTN, HLD, Vfib on amio who presented as a direct admission from [facility]/PH clinic for worsening shortness of breath.      The patient was recently admitted to the [facility] service on 10/12 as a transfer from [facility] for IVÁN and CHF. During this time he was placed on . He got RHC on 10/13 which showed RA: 2 RV: 43/2 PA: 35/16, mean 23 PCWP: 12. Kidney function slowly recovering with holding of diuretics.  His GDMT (entresto, aldactone, jardiance) and diuretics were held at the time. Patient was found to not be a candidate for advanced therapies (due to insurance) here at [facility] and was discharged home on  5 with plan to follow up in clinic in order to help find an alternative center for OHTx/VAD. He had a subsequent admission on 10/19 with worsening dyspnea, orthopnea and PND. Was observed overnight and diuresed prior to discharge the following day. Patient presented today in clinic, and was noted to be short of breath to a point he was unable to complete his sentences in clinic.       Past Medical History:   Diagnosis Date    CAD (coronary artery disease)     Diabetes mellitus     HFrEF (heart failure with reduced ejection fraction)     ICD (implantable cardioverter-defibrillator) in place     MI, old        Past Surgical History:   Procedure Laterality Date    CARDIAC SURGERY      RIGHT HEART CATHETERIZATION Right 10/10/2023     Procedure: INSERTION, CATHETER, RIGHT HEART;  Surgeon: Bin Gandhi MD;  Location: [facility] CATH LAB;  Service: Cardiology;  Laterality: Right;    RIGHT HEART CATHETERIZATION Right 10/13/2023    Procedure: INSERTION, CATHETER, RIGHT HEART;  Surgeon: Walter Mcintyre MD;  Location: [facility] CATH LAB;  Service: Cardiology;  Laterality: Right;       Review of patient's allergies indicates:  No Known Allergies    No current facility-administered medications on file prior to encounter.     Current Outpatient Medications on File Prior to Encounter   Medication Sig    amiodarone (PACERONE) 400 MG tablet Take 1 tablet (400 mg total) by mouth once daily.    apixaban (ELIQUIS) 5 mg Tab Take 1 tablet (5 mg total) by mouth 2 (two) times daily.    aspirin (ECOTRIN) 81 MG EC tablet Take 81 mg by mouth once daily.    atorvastatin (LIPITOR) 40 MG tablet Take 40 mg by mouth.    DOBUTamine (DOBUTREX) 1,000 mg/250 mL (4,000 mcg/mL) infusion Inject 389.5 mcg/min into the vein continuous.    famotidine (PEPCID) 20 MG tablet Take 1 tablet (20 mg total) by mouth once daily.    fish oil-omega-3 fatty acids 300-1,000 mg capsule Take 2 g by mouth once daily.    furosemide (LASIX) 20 MG tablet Take 1 tablet (20 mg total) by mouth 2 (two) times daily. Take an extra 20 mg if weight gain of >3 lbs in a day or >5 lbs in a week.    gabapentin (NEURONTIN) 300 MG capsule Take 300 mg by mouth nightly as needed.    multivitamin capsule Take 1 capsule by mouth once daily.    potassium chloride SA (K-DUR,KLOR-CON) 20 MEQ tablet Take 1 tablet (20 mEq total) by mouth once daily.     Family History    None       Tobacco Use    Smoking status: Every Day     Current packs/day: 0.50     Types: Cigarettes    Smokeless tobacco: Not on file   Substance and Sexual Activity    Alcohol use: Yes     Comment: rarely    Drug use: No    Sexual activity: Not on file       Vital Signs (Most Recent):  Temp: 98 °F (36.7 °C) (10/26/23 1814)  Pulse: 92 (10/26/23  1814)  Resp: 18 (10/26/23 1814)  BP: 120/75 (10/26/23 1814)  SpO2: 96 % (10/26/23 1814) Vital Signs (24h Range):  Temp:  [98 °F (36.7 °C)] 98 °F (36.7 °C)  Pulse:  [88-92] 92  Resp:  [18] 18  SpO2:  [96 %] 96 %  BP: (111-120)/(75-76) 120/75        There is no height or weight on file to calculate BMI.    SpO2: 96 %       No intake or output data in the 24 hours ending 10/26/23 1854    Lines/Drains/Airways       Peripherally Inserted Central Catheter Line  Duration             PICC Double Lumen 10/13/23 1542 right basilic 13 days                     Physical Exam  Vitals and nursing note reviewed.   Constitutional:       Appearance: Normal appearance.   Eyes:      Conjunctiva/sclera: Conjunctivae normal.   Cardiovascular:      Rate and Rhythm: Normal rate and regular rhythm.   Pulmonary:      Effort: Pulmonary effort is normal.      Breath sounds: Normal breath sounds. No wheezing.   Abdominal:      General: There is distension.      Palpations: Abdomen is soft.   Musculoskeletal:      Right lower leg: Edema present.      Left lower leg: Edema present.   Skin:     General: Skin is warm.   Neurological:      General: No focal deficit present.      Mental Status: He is alert.          Significant Labs: All pertinent lab results from the last 24 hours have been reviewed.    Assessment and Plan:     Acute on chronic HFrEF (heart failure with reduced ejection fraction)  Acute on chronic  2/2 iCM  EF 15-20%%  Last TTE on 10/26 with:  EF 15-20%, LVIDd 7.1 cm. Normal RV size with normal RVSF. mild AS, mild to mod MR. CVP 8 mmHg  Last  bl ~400  On home  5  Currently not a candidate for advanced options at [facility] due to insurance    Plan:  -- cxr ordered  -- will begin diuresis with iv lv lasix  Push followed by gtt at rate of 10/hr  -- strict I&O  -- will keep  5  -- lactate ordered  -- replete electrolytes as needed      Paroxysmal atrial fibrillation  Will cont amiodarone ( currently on VT dose)  Cont AC with  Eliquis    DM (diabetes mellitus)  Last hgb a1c 7.6  Will place on SSI and adjust as needed while inpatient    Ventricular tachycardia  H/o VT  On amiodarone 400 qd- Will cont  Tele monitoring      VTE Risk Mitigation (From admission, onward)         Ordered     IP VTE HIGH RISK PATIENT  Once         10/26/23 1852     Place sequential compression device  Until discontinued         10/26/23 1852                Yudy Stockton MD  Cardiology   [facility] - Cardiology Stepdown

## 2023-10-26 NOTE — PROGRESS NOTES
Patient ID:   Mr. Radha Abbott is a 63 y.o. male presenting with Acute on chronic combined systolic and diastolic CHF, NYHA class 4, here for hospital follow-up.      Subjective:   HPI:  Pt is a 62yo M with pmhx of CAD s/p 3v CABG (unclear anatomy, 2009), ICM with a recent EF of 15-20% s/p ICD (medmauricio 2009), DM2 (a1c 7.7), HTN, HLD, Vfib on amio who presents to the ED for dyspnea.      The patient was recently admitted to the hospitals service on 10/12 as a transfer from [facility] for IVÁN and CHF. During this time he was placed on . He got RHC on 10/13 which showed RA: 2 RV: 43/2 PA: 35/16, mean 23 PCWP: 12. Kidney function slowly recovering with holding of diuretics.  His GDMT (entresto, aldactone, jardiance)  and diuretics were held. Due to having [employer] med[place] and is out of network and not a candidate to receive advanced therapies at [facility]. He was discharged with  5 with plan to follow up in our clinic in 1 week and search for alternate center to pursue LVAD/ OHTx.      Mr. Abbott was then admitted to hospitals on 10/19/2023 with c/o worsening orthopnea, PND, SOB. Lab results showed CR and BNP trending up.  Hospital Course: Mr Abbott was admitted as an overnight observation on 10/19 due to feelings of SOB, orthopnea, abdominal bloating. He was given IVP lasix 40 mg x 1. It was felt that he was euvolemic and at a good dry weight (172 lbs). Watched overnight his sCr improved from 2.1 to 1.7. He was started on famotidine for possible GERD symptoms being perceived as orthopnea. He was discharged on 10/20 on a low dose oral lasix as daily maintenance (20 mg daily) and instructed to take an extra 20 mg if his weight uptrends by >3 lbs in a day. He has a clinic appointment with hospitals on 10/26. GDMT medications being held in meantime to see if kidney function recovers fully. (Unknown baseline of sCr but has been downtrending from 2.8 since he was first admitted last week).      Mr. Abbott is here today for hospital f/u. He  reports feeling worse since discharge. Dobutamine infusing. He called the nurse yesterday and reports SOB and inability to complete full sentences. He took an extra lasix - 20mg, TID, instead of BID. It did not help much and he was unable to sleep because he can't lay flat. He denies CP, palpitations, or ICD shocks. He was prepared today for possible admission.  Mr. Abbott and his wife also have questions about LVAD/OHTx. Had financial counselor come talk to them about insurance being out of network. They asked, after the counselor had gone, if someone would help them find other centers.      Past Medical History:   Diagnosis Date    CAD (coronary artery disease)     Diabetes mellitus     HFrEF (heart failure with reduced ejection fraction)     ICD (implantable cardioverter-defibrillator) in place     MI, old      Current Outpatient Medications   Medication Instructions    amiodarone (PACERONE) 400 mg, Oral, Daily    aspirin (ECOTRIN) 81 mg, Oral, Daily    atorvastatin (LIPITOR) 40 mg, Oral    DOBUTamine (DOBUTREX) 1,000 mg/250 mL (4,000 mcg/mL) infusion 5 mcg/kg/min, Intravenous, Continuous    ELIQUIS 5 mg, Oral, 2 times daily    famotidine (PEPCID) 20 mg, Oral, Daily    fish oil-omega-3 fatty acids 300-1,000 mg capsule 2 g, Oral, Daily    furosemide (LASIX) 20 mg, Oral, 2 times daily, Take an extra 20 mg if weight gain of >3 lbs in a day or >5 lbs in a week.    gabapentin (NEURONTIN) 300 mg, Oral, Nightly PRN    multivitamin capsule 1 capsule, Oral, Daily    potassium chloride SA (K-DUR,KLOR-CON) 20 MEQ tablet 20 mEq, Oral, Daily        Review of Systems   Constitutional: Positive for weight gain.   HENT:  Positive for congestion.    Cardiovascular:  Positive for dyspnea on exertion, orthopnea and paroxysmal nocturnal dyspnea.   Respiratory:  Positive for shortness of breath and sleep disturbances due to breathing. Negative for cough and wheezing.    Skin: Negative.    Musculoskeletal: Negative.    Gastrointestinal:   Positive for bloating.   Genitourinary: Negative.    Psychiatric/Behavioral: Negative.            Objective: /76 (BP Location: Left arm, Patient Position: Sitting, BP Method: Medium (Automatic))   Pulse 88   Ht 6' (1.829 m)   Wt 82 kg (180 lb 12.4 oz)   SpO2 96%   BMI 24.52 kg/m²         Physical Exam  Constitutional:       Appearance: He is ill-appearing.   HENT:      Head: Normocephalic.   Neck:      Vascular: JVD present.   Cardiovascular:      Rate and Rhythm: Normal rate and regular rhythm.      Pulses: Normal pulses.      Heart sounds: Normal heart sounds.   Pulmonary:      Effort: Pulmonary effort is normal.      Comments: Diminished breath sounds  Abdominal:      Palpations: Abdomen is soft.   Musculoskeletal:         General: Normal range of motion.      Cervical back: Normal range of motion.   Skin:     General: Skin is warm and dry.      Capillary Refill: Capillary refill takes less than 2 seconds.   Neurological:      Mental Status: He is alert and oriented to person, place, and time.   Psychiatric:         Mood and Affect: Mood normal.         Behavior: Behavior normal.           Lab Results   Component Value Date     (H) 10/26/2023     10/26/2023    K 5.0 10/26/2023    MG 2.3 10/26/2023     10/26/2023    CO2 19 (L) 10/26/2023    BUN 41 (H) 10/26/2023    CREATININE 2.2 (H) 10/26/2023     (H) 10/26/2023    HGBA1C 7.6 (H) 10/12/2023    AST 16 10/20/2023    ALT 15 10/20/2023    ALBUMIN 3.3 (L) 10/20/2023    PROT 6.6 10/20/2023    BILITOT 0.8 10/20/2023    CHOL 135 11/03/2021    CHOL 157 02/07/2018    HDL 28 (L) 11/03/2021    HDL 32 (L) 02/07/2018    LDLCALC 81 11/03/2021    LDLCALC 81.8 02/07/2018    TRIG 131 11/03/2021    TRIG 216 (H) 02/07/2018       Magnesium   Date Value Ref Range Status   10/26/2023 2.3 1.6 - 2.6 mg/dL Final       Lab Results   Component Value Date    WBC 3.44 (L) 10/26/2023    HGB 10.3 (L) 10/26/2023    HCT 31.1 (L) 10/26/2023    MCV 91  "10/26/2023     10/26/2023       Lab Results   Component Value Date    INR 1.0 10/16/2023    INR 1.0 10/15/2023    INR 1.0 10/14/2023       BNP   Date Value Ref Range Status   10/26/2023 940 (H) 0 - 99 pg/mL Final     Comment:     Values of less than 100 pg/ml are consistent with non-CHF populations.   10/23/2023 911 (H) 0 - 99 pg/mL Final     Comment:     Values of less than 100 pg/ml are consistent with non-CHF populations.   10/19/2023 703 (H) 0 - 99 pg/mL Final     Comment:     Values of less than 100 pg/ml are consistent with non-CHF populations.       No results found for: "LDH"         Assessment:      1. Acute on chronic combined systolic and diastolic CHF, NYHA class 4    2. PAF (paroxysmal atrial fibrillation)    3. IVÁN (acute kidney injury)    4. Receiving inotropic medication      Plan:      Mr. Abbott has IVÁN - Cr 2.1, elevated BNP and he is volume overloaded on exam. Needs to be admitted for diuresis. Can hopefully restart some GDMT.     Plan to admit to cardiology. Stable for admit office. Transported to admit office via wheelchair.     Dr. Sandhya Price ([facility]) and Dr. Kendrick Galindo ([facility]) notified.      "

## 2023-10-27 ENCOUNTER — TELEPHONE (OUTPATIENT)
Dept: TRANSPLANT | Facility: CLINIC | Age: 63
End: 2023-10-27
Payer: MEDICAID

## 2023-10-27 DIAGNOSIS — I50.43 ACUTE ON CHRONIC COMBINED SYSTOLIC AND DIASTOLIC CHF, NYHA CLASS 4: Primary | ICD-10-CM

## 2023-10-27 LAB
ALBUMIN SERPL BCP-MCNC: 3.3 G/DL (ref 3.5–5.2)
ALLENS TEST: ABNORMAL
ALP SERPL-CCNC: 68 U/L (ref 55–135)
ALT SERPL W/O P-5'-P-CCNC: 17 U/L (ref 10–44)
ANION GAP SERPL CALC-SCNC: 11 MMOL/L (ref 8–16)
ANION GAP SERPL CALC-SCNC: 13 MMOL/L (ref 8–16)
AST SERPL-CCNC: 14 U/L (ref 10–40)
BASOPHILS # BLD AUTO: 0.04 K/UL (ref 0–0.2)
BASOPHILS NFR BLD: 1.1 % (ref 0–1.9)
BILIRUB SERPL-MCNC: 0.9 MG/DL (ref 0.1–1)
BUN SERPL-MCNC: 39 MG/DL (ref 8–23)
BUN SERPL-MCNC: 39 MG/DL (ref 8–23)
CALCIUM SERPL-MCNC: 8.6 MG/DL (ref 8.7–10.5)
CALCIUM SERPL-MCNC: 9 MG/DL (ref 8.7–10.5)
CHLORIDE SERPL-SCNC: 104 MMOL/L (ref 95–110)
CHLORIDE SERPL-SCNC: 105 MMOL/L (ref 95–110)
CO2 SERPL-SCNC: 19 MMOL/L (ref 23–29)
CO2 SERPL-SCNC: 21 MMOL/L (ref 23–29)
CREAT SERPL-MCNC: 1.8 MG/DL (ref 0.5–1.4)
CREAT SERPL-MCNC: 2.1 MG/DL (ref 0.5–1.4)
DIFFERENTIAL METHOD: ABNORMAL
EOSINOPHIL # BLD AUTO: 0.2 K/UL (ref 0–0.5)
EOSINOPHIL NFR BLD: 4.8 % (ref 0–8)
ERYTHROCYTE [DISTWIDTH] IN BLOOD BY AUTOMATED COUNT: 13.5 % (ref 11.5–14.5)
EST. GFR  (NO RACE VARIABLE): 35.2 ML/MIN/1.73 M^2
EST. GFR  (NO RACE VARIABLE): 42.3 ML/MIN/1.73 M^2
GLUCOSE SERPL-MCNC: 189 MG/DL (ref 70–110)
GLUCOSE SERPL-MCNC: 205 MG/DL (ref 70–110)
HCO3 UR-SCNC: 22 MMOL/L (ref 24–28)
HCT VFR BLD AUTO: 28.5 % (ref 40–54)
HCT VFR BLD CALC: 29 %PCV (ref 36–54)
HGB BLD-MCNC: 9.9 G/DL (ref 14–18)
IMM GRANULOCYTES # BLD AUTO: 0.02 K/UL (ref 0–0.04)
IMM GRANULOCYTES NFR BLD AUTO: 0.6 % (ref 0–0.5)
LYMPHOCYTES # BLD AUTO: 0.9 K/UL (ref 1–4.8)
LYMPHOCYTES NFR BLD: 26.4 % (ref 18–48)
MAGNESIUM SERPL-MCNC: 2 MG/DL (ref 1.6–2.6)
MAGNESIUM SERPL-MCNC: 2.2 MG/DL (ref 1.6–2.6)
MCH RBC QN AUTO: 30.8 PG (ref 27–31)
MCHC RBC AUTO-ENTMCNC: 34.7 G/DL (ref 32–36)
MCV RBC AUTO: 89 FL (ref 82–98)
MONOCYTES # BLD AUTO: 0.2 K/UL (ref 0.3–1)
MONOCYTES NFR BLD: 6.7 % (ref 4–15)
NEUTROPHILS # BLD AUTO: 2.2 K/UL (ref 1.8–7.7)
NEUTROPHILS NFR BLD: 60.4 % (ref 38–73)
NRBC BLD-RTO: 0 /100 WBC
PCO2 BLDA: 33.8 MMHG (ref 35–45)
PH SMN: 7.42 [PH] (ref 7.35–7.45)
PLATELET # BLD AUTO: 242 K/UL (ref 150–450)
PMV BLD AUTO: 10.5 FL (ref 9.2–12.9)
PO2 BLDA: 26 MMHG (ref 40–60)
POC BE: -2 MMOL/L
POC IONIZED CALCIUM: 1.21 MMOL/L (ref 1.06–1.42)
POC SATURATED O2: 50 % (ref 95–100)
POC TCO2: 23 MMOL/L (ref 24–29)
POTASSIUM BLD-SCNC: 4.1 MMOL/L (ref 3.5–5.1)
POTASSIUM SERPL-SCNC: 3.9 MMOL/L (ref 3.5–5.1)
POTASSIUM SERPL-SCNC: 4 MMOL/L (ref 3.5–5.1)
PROT SERPL-MCNC: 6.6 G/DL (ref 6–8.4)
RBC # BLD AUTO: 3.21 M/UL (ref 4.6–6.2)
SAMPLE: ABNORMAL
SITE: ABNORMAL
SODIUM BLD-SCNC: 139 MMOL/L (ref 136–145)
SODIUM SERPL-SCNC: 136 MMOL/L (ref 136–145)
SODIUM SERPL-SCNC: 137 MMOL/L (ref 136–145)
WBC # BLD AUTO: 3.56 K/UL (ref 3.9–12.7)

## 2023-10-27 PROCEDURE — 63600175 PHARM REV CODE 636 W HCPCS: Performed by: STUDENT IN AN ORGANIZED HEALTH CARE EDUCATION/TRAINING PROGRAM

## 2023-10-27 PROCEDURE — 99231 PR SUBSEQUENT HOSPITAL CARE,LEVL I: ICD-10-PCS | Mod: ,,, | Performed by: INTERNAL MEDICINE

## 2023-10-27 PROCEDURE — 94761 N-INVAS EAR/PLS OXIMETRY MLT: CPT

## 2023-10-27 PROCEDURE — 99900035 HC TECH TIME PER 15 MIN (STAT)

## 2023-10-27 PROCEDURE — 20600001 HC STEP DOWN PRIVATE ROOM

## 2023-10-27 PROCEDURE — 85025 COMPLETE CBC W/AUTO DIFF WBC: CPT | Performed by: STUDENT IN AN ORGANIZED HEALTH CARE EDUCATION/TRAINING PROGRAM

## 2023-10-27 PROCEDURE — 25000003 PHARM REV CODE 250: Performed by: STUDENT IN AN ORGANIZED HEALTH CARE EDUCATION/TRAINING PROGRAM

## 2023-10-27 PROCEDURE — 80053 COMPREHEN METABOLIC PANEL: CPT | Performed by: STUDENT IN AN ORGANIZED HEALTH CARE EDUCATION/TRAINING PROGRAM

## 2023-10-27 PROCEDURE — 83735 ASSAY OF MAGNESIUM: CPT | Performed by: STUDENT IN AN ORGANIZED HEALTH CARE EDUCATION/TRAINING PROGRAM

## 2023-10-27 PROCEDURE — 80048 BASIC METABOLIC PNL TOTAL CA: CPT | Mod: XB

## 2023-10-27 PROCEDURE — 83735 ASSAY OF MAGNESIUM: CPT | Mod: 91

## 2023-10-27 PROCEDURE — 99231 SBSQ HOSP IP/OBS SF/LOW 25: CPT | Mod: ,,, | Performed by: INTERNAL MEDICINE

## 2023-10-27 RX ORDER — PANTOPRAZOLE SODIUM 40 MG/1
40 TABLET, DELAYED RELEASE ORAL
Status: DISCONTINUED | OUTPATIENT
Start: 2023-10-28 | End: 2023-11-01 | Stop reason: HOSPADM

## 2023-10-27 RX ADMIN — VALSARTAN 20 MG: 40 TABLET, FILM COATED ORAL at 09:10

## 2023-10-27 RX ADMIN — ATORVASTATIN CALCIUM 40 MG: 40 TABLET, FILM COATED ORAL at 09:10

## 2023-10-27 RX ADMIN — ASPIRIN 81 MG: 81 TABLET, COATED ORAL at 08:10

## 2023-10-27 RX ADMIN — FAMOTIDINE 20 MG: 20 TABLET ORAL at 08:10

## 2023-10-27 RX ADMIN — VALSARTAN 20 MG: 40 TABLET, FILM COATED ORAL at 12:10

## 2023-10-27 RX ADMIN — AMIODARONE HYDROCHLORIDE 400 MG: 200 TABLET ORAL at 08:10

## 2023-10-27 RX ADMIN — FUROSEMIDE 10 MG/HR: 10 INJECTION, SOLUTION INTRAMUSCULAR; INTRAVENOUS at 09:10

## 2023-10-27 RX ADMIN — APIXABAN 5 MG: 5 TABLET, FILM COATED ORAL at 09:10

## 2023-10-27 RX ADMIN — APIXABAN 5 MG: 5 TABLET, FILM COATED ORAL at 08:10

## 2023-10-27 NOTE — SUBJECTIVE & OBJECTIVE
Interval History: Almost 2L UOP on Lasix gtt 10/hr overnight.  Reports feeling back to baseline.  CVP remains elevated, however.  Wife has obtained in-[employer]-network centers, including centers in [place] and [place].    Objective:     Vital Signs (Most Recent):  Temp: 97.1 °F (36.2 °C) (10/27/23 1214)  Pulse: 86 (10/27/23 1245)  Resp: 16 (10/27/23 1214)  BP: 117/72 (10/27/23 1214)  SpO2: 97 % (10/27/23 1214) Vital Signs (24h Range):  Temp:  [96.8 °F (36 °C)-98 °F (36.7 °C)] 97.1 °F (36.2 °C)  Pulse:  [75-92] 86  Resp:  [16-19] 16  SpO2:  [96 %-99 %] 97 %  BP: (115-125)/(70-76) 117/72        There is no height or weight on file to calculate BMI.     SpO2: 97 %         Intake/Output Summary (Last 24 hours) at 10/27/2023 1454  Last data filed at 10/27/2023 0625  Gross per 24 hour   Intake 400 ml   Output 1650 ml   Net -1250 ml       Lines/Drains/Airways       Peripherally Inserted Central Catheter Line  Duration             PICC Double Lumen 10/13/23 1542 right basilic 13 days                       Physical Exam  Constitutional:       Appearance: Normal appearance. He is normal weight. He is not ill-appearing.   HENT:      Head: Atraumatic.   Neck:      Comments: No elevated JVD  Cardiovascular:      Rate and Rhythm: Normal rate.      Heart sounds: No murmur heard.  Pulmonary:      Effort: Pulmonary effort is normal.      Breath sounds: Normal breath sounds.   Abdominal:      General: There is distension.      Palpations: Abdomen is soft.   Musculoskeletal:      Right lower leg: Edema present.      Left lower leg: Edema present.   Skin:     General: Skin is warm.   Neurological:      Mental Status: He is alert.            Significant Labs: ABG:   Recent Labs   Lab 10/27/23  1125   PH 7.422   PCO2 33.8*   HCO3 22.0*   POCSATURATED 50   BE -2   , CMP   Recent Labs   Lab 10/26/23  1224 10/27/23  0915    136   K 5.0 3.9    104   CO2 19* 21*   * 189*   BUN 41* 39*   CREATININE 2.2* 1.8*   CALCIUM 9.4 9.0  "  PROT  --  6.6   ALBUMIN  --  3.3*   BILITOT  --  0.9   ALKPHOS  --  68   AST  --  14   ALT  --  17   ANIONGAP 12 11   , CBC   Recent Labs   Lab 10/26/23  1224 10/27/23  0915 10/27/23  1125   WBC 3.44* 3.56*  --    HGB 10.3* 9.9*  --    HCT 31.1* 28.5* 29*    242  --    , Lipid Panel No results for input(s): "CHOL", "HDL", "LDLCALC", "TRIG", "CHOLHDL" in the last 48 hours., Troponin No results for input(s): "TROPONINI" in the last 48 hours., and Lactate 1.0.    Significant Imaging: X-Ray: CXR: X-Ray Chest 1 View (CXR):   Results for orders placed or performed during the hospital encounter of 10/26/23   X-Ray Chest 1 View    Narrative    EXAMINATION:  XR CHEST 1 VIEW    CLINICAL HISTORY:  sob;    TECHNIQUE:  Single frontal view of the chest was performed.    COMPARISON:  10/19/2023    FINDINGS:  Right PICC catheter tip projects over the mid to distal SVC.  Left chest wall pacer noted.  The cardiomediastinal silhouette is prominent, similar to the previous exam noting magnification by technique and surgical changes..  There is no pleural effusion.  The trachea is midline.  The lungs are symmetrically expanded bilaterally with coarse interstitial attenuation, possibly reflecting edema..  No large focal consolidation seen.  There is no pneumothorax.  The osseous structures are unchanged..      Impression    As above      Electronically signed by: Norm Mccain MD  Date:    10/26/2023  Time:    20:32     "

## 2023-10-27 NOTE — PLAN OF CARE
Problem: Violence Risk or Actual  Goal: Anger and Impulse Control  Outcome: Ongoing, Progressing     Problem: Adult Inpatient Plan of Care  Goal: Plan of Care Review  Outcome: Ongoing, Progressing

## 2023-10-27 NOTE — ASSESSMENT & PLAN NOTE
2/2 iCM  Last TTE on 10/26 with: EF 15-20%, LVIDd 7.1 cm. Normal RV size with normal RVSF. mild AS, mild to mod MR. CVP 8 mmHg  Last  bl ~400  CXR with vascular congestion  On home  5  Currently not a candidate for advanced options at [facility] due to insurance ([employer]; [facility] is not in their network)    · GDMT as tolerated: starting valsartan 20 BID today  · Continue diuresis with iv lv lasix gtt at rate of 10/hr  · Strict I&O  · Continue  5  · Replete electrolytes as needed  · Family will need to contact one of the in-network centers to initiate transplant workup

## 2023-10-27 NOTE — ASSESSMENT & PLAN NOTE
Acute on chronic  2/2 iCM  EF 15-20%%  Last TTE on 10/26 with:  EF 15-20%, LVIDd 7.1 cm. Normal RV size with normal RVSF. mild AS, mild to mod MR. CVP 8 mmHg  Last  bl ~400  On home  5  Currently not a candidate for advanced options at [facility] due to insurance    Plan:  -- cxr ordered  -- will begin diuresis with iv lv lasix  Push followed by gtt at rate of 10/hr  -- strict I&O  -- will keep  5  -- lactate ordered  -- replete electrolytes as needed

## 2023-10-27 NOTE — PROGRESS NOTES
[facility] - Cardiology Stepdown  Cardiology  Progress Note    Patient Name: Radha Abbott  MRN: [id number]  Admission Date: 10/26/2023  Hospital Length of Stay: 1 days  Code Status: Full Code   Attending Physician: Dmitry Mayo MD   Primary Care Physician: Vasu Kong MD  Expected Discharge Date: 10/29/2023  Principal Problem:HFrEF (heart failure with reduced ejection fraction)    Subjective:     Hospital Course:   Interval History: Almost 2L UOP on Lasix gtt 10/hr overnight.  Reports feeling back to baseline.  CVP remains elevated, however.  Wife has obtained in-[employer]-network centers, including centers in [place] and [place].    Objective:     Vital Signs (Most Recent):  Temp: 97.1 °F (36.2 °C) (10/27/23 1214)  Pulse: 86 (10/27/23 1245)  Resp: 16 (10/27/23 1214)  BP: 117/72 (10/27/23 1214)  SpO2: 97 % (10/27/23 1214) Vital Signs (24h Range):  Temp:  [96.8 °F (36 °C)-98 °F (36.7 °C)] 97.1 °F (36.2 °C)  Pulse:  [75-92] 86  Resp:  [16-19] 16  SpO2:  [96 %-99 %] 97 %  BP: (115-125)/(70-76) 117/72        BMI 24.52     SpO2: 97 %     Intake/Output Summary (Last 24 hours) at 10/27/2023 1454  Last data filed at 10/27/2023 0625  Gross per 24 hour   Intake 400 ml   Output 1650 ml   Net -1250 ml       Lines/Drains/Airways       Peripherally Inserted Central Catheter Line  Duration             PICC Double Lumen 10/13/23 1542 right basilic 13 days                Physical Exam  Constitutional:       Appearance: Normal appearance. He is normal weight. He is not ill-appearing.   HENT:      Head: Atraumatic.   Neck:      Comments: No elevated JVD  Cardiovascular:      Rate and Rhythm: Normal rate.      Heart sounds: No murmur heard.  Pulmonary:      Effort: Pulmonary effort is normal.      Breath sounds: Normal breath sounds.   Abdominal:      General: There is distension.      Palpations: Abdomen is soft.   Musculoskeletal:      Right lower leg: Edema present.      Left lower leg: Edema present.   Skin:     General:  "Skin is warm.   Neurological:      Mental Status: He is alert.            Significant Labs: ABG:   Recent Labs   Lab 10/27/23  1125   PH 7.422   PCO2 33.8*   HCO3 22.0*   POCSATURATED 50   BE -2   , CMP   Recent Labs   Lab 10/26/23  1224 10/27/23  0915    136   K 5.0 3.9    104   CO2 19* 21*   * 189*   BUN 41* 39*   CREATININE 2.2* 1.8*   CALCIUM 9.4 9.0   PROT  --  6.6   ALBUMIN  --  3.3*   BILITOT  --  0.9   ALKPHOS  --  68   AST  --  14   ALT  --  17   ANIONGAP 12 11   , CBC   Recent Labs   Lab 10/26/23  1224 10/27/23  0915 10/27/23  1125   WBC 3.44* 3.56*  --    HGB 10.3* 9.9*  --    HCT 31.1* 28.5* 29*    242  --    , Lipid Panel No results for input(s): "CHOL", "HDL", "LDLCALC", "TRIG", "CHOLHDL" in the last 48 hours., Troponin No results for input(s): "TROPONINI" in the last 48 hours., and Lactate 1.0.    Significant Imaging: X-Ray: CXR: X-Ray Chest 1 View (CXR):   Results for orders placed or performed during the hospital encounter of 10/26/23   X-Ray Chest 1 View    Narrative    EXAMINATION:  XR CHEST 1 VIEW    CLINICAL HISTORY:  sob;    TECHNIQUE:  Single frontal view of the chest was performed.    COMPARISON:  10/19/2023    FINDINGS:  Right PICC catheter tip projects over the mid to distal SVC.  Left chest wall pacer noted.  The cardiomediastinal silhouette is prominent, similar to the previous exam noting magnification by technique and surgical changes..  There is no pleural effusion.  The trachea is midline.  The lungs are symmetrically expanded bilaterally with coarse interstitial attenuation, possibly reflecting edema..  No large focal consolidation seen.  There is no pneumothorax.  The osseous structures are unchanged..      Impression    As above      Electronically signed by: Norm Mccain MD  Date:    10/26/2023  Time:    20:32     Assessment and Plan:     * Acute on chronic HFrEF (heart failure with reduced ejection fraction)  2/2 iCM  Last TTE on 10/26 with: EF " 15-20%, LVIDd 7.1 cm. Normal RV size with normal RVSF. mild AS, mild to mod MR. CVP 8 mmHg  Last  bl ~400  CXR with vascular congestion  On home  5  Currently not a candidate for advanced options at [facility] due to insurance ([employer]; [facility] is not in their network)    · GDMT as tolerated: starting valsartan 20 BID today  · Continue diuresis with iv lv lasix gtt at rate of 10/hr  · Strict I&O  · Continue  5  · Replete electrolytes as needed  · Family will need to contact one of the in-network centers to initiate transplant workup      Paroxysmal atrial fibrillation  Will cont amiodarone ( currently on VT dose)  Cont AC with Eliquis    DM (diabetes mellitus)  Last hgb a1c 7.6.  No home meds.  Will place on SSI and adjust as needed while inpatient    Ventricular tachycardia  H/o VT  On amiodarone 400 qd- Will cont  Tele monitoring      VTE Risk Mitigation (From admission, onward)         Ordered     apixaban tablet 5 mg  2 times daily         10/26/23 1857     IP VTE HIGH RISK PATIENT  Once         10/26/23 1852     Place sequential compression device  Until discontinued         10/26/23 1852                Nic Mares MD  Cardiology  [facility] - Cardiology Stepdown
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
## 2023-10-28 PROBLEM — E11.42 TYPE 2 DIABETES MELLITUS WITH DIABETIC POLYNEUROPATHY, WITHOUT LONG-TERM CURRENT USE OF INSULIN: Status: ACTIVE | Noted: 2023-10-07

## 2023-10-28 LAB
ALBUMIN SERPL BCP-MCNC: 3.4 G/DL (ref 3.5–5.2)
ALP SERPL-CCNC: 72 U/L (ref 55–135)
ALT SERPL W/O P-5'-P-CCNC: 15 U/L (ref 10–44)
ANION GAP SERPL CALC-SCNC: 14 MMOL/L (ref 8–16)
AST SERPL-CCNC: 13 U/L (ref 10–40)
BASOPHILS # BLD AUTO: 0.06 K/UL (ref 0–0.2)
BASOPHILS NFR BLD: 1.7 % (ref 0–1.9)
BILIRUB SERPL-MCNC: 0.7 MG/DL (ref 0.1–1)
BUN SERPL-MCNC: 33 MG/DL (ref 8–23)
CALCIUM SERPL-MCNC: 9 MG/DL (ref 8.7–10.5)
CHLORIDE SERPL-SCNC: 103 MMOL/L (ref 95–110)
CO2 SERPL-SCNC: 21 MMOL/L (ref 23–29)
CREAT SERPL-MCNC: 2.3 MG/DL (ref 0.5–1.4)
DIFFERENTIAL METHOD: ABNORMAL
EOSINOPHIL # BLD AUTO: 0.2 K/UL (ref 0–0.5)
EOSINOPHIL NFR BLD: 5.7 % (ref 0–8)
ERYTHROCYTE [DISTWIDTH] IN BLOOD BY AUTOMATED COUNT: 13.8 % (ref 11.5–14.5)
EST. GFR  (NO RACE VARIABLE): 31.5 ML/MIN/1.73 M^2
GLUCOSE SERPL-MCNC: 295 MG/DL (ref 70–110)
HCT VFR BLD AUTO: 29.8 % (ref 40–54)
HGB BLD-MCNC: 10.1 G/DL (ref 14–18)
IMM GRANULOCYTES # BLD AUTO: 0.01 K/UL (ref 0–0.04)
IMM GRANULOCYTES NFR BLD AUTO: 0.3 % (ref 0–0.5)
LYMPHOCYTES # BLD AUTO: 0.8 K/UL (ref 1–4.8)
LYMPHOCYTES NFR BLD: 21.9 % (ref 18–48)
MAGNESIUM SERPL-MCNC: 2 MG/DL (ref 1.6–2.6)
MCH RBC QN AUTO: 30.2 PG (ref 27–31)
MCHC RBC AUTO-ENTMCNC: 33.9 G/DL (ref 32–36)
MCV RBC AUTO: 89 FL (ref 82–98)
MONOCYTES # BLD AUTO: 0.3 K/UL (ref 0.3–1)
MONOCYTES NFR BLD: 7.1 % (ref 4–15)
NEUTROPHILS # BLD AUTO: 2.2 K/UL (ref 1.8–7.7)
NEUTROPHILS NFR BLD: 63.3 % (ref 38–73)
NRBC BLD-RTO: 0 /100 WBC
PLATELET # BLD AUTO: 261 K/UL (ref 150–450)
PMV BLD AUTO: 10.8 FL (ref 9.2–12.9)
POCT GLUCOSE: 215 MG/DL (ref 70–110)
POCT GLUCOSE: 254 MG/DL (ref 70–110)
POTASSIUM SERPL-SCNC: 3.5 MMOL/L (ref 3.5–5.1)
PROT SERPL-MCNC: 6.9 G/DL (ref 6–8.4)
RBC # BLD AUTO: 3.34 M/UL (ref 4.6–6.2)
SODIUM SERPL-SCNC: 138 MMOL/L (ref 136–145)
WBC # BLD AUTO: 3.51 K/UL (ref 3.9–12.7)

## 2023-10-28 PROCEDURE — 63600175 PHARM REV CODE 636 W HCPCS: Performed by: STUDENT IN AN ORGANIZED HEALTH CARE EDUCATION/TRAINING PROGRAM

## 2023-10-28 PROCEDURE — 80053 COMPREHEN METABOLIC PANEL: CPT | Performed by: STUDENT IN AN ORGANIZED HEALTH CARE EDUCATION/TRAINING PROGRAM

## 2023-10-28 PROCEDURE — 25000003 PHARM REV CODE 250: Performed by: STUDENT IN AN ORGANIZED HEALTH CARE EDUCATION/TRAINING PROGRAM

## 2023-10-28 PROCEDURE — 83735 ASSAY OF MAGNESIUM: CPT | Performed by: STUDENT IN AN ORGANIZED HEALTH CARE EDUCATION/TRAINING PROGRAM

## 2023-10-28 PROCEDURE — 85025 COMPLETE CBC W/AUTO DIFF WBC: CPT | Performed by: STUDENT IN AN ORGANIZED HEALTH CARE EDUCATION/TRAINING PROGRAM

## 2023-10-28 PROCEDURE — 20600001 HC STEP DOWN PRIVATE ROOM

## 2023-10-28 PROCEDURE — 99231 PR SUBSEQUENT HOSPITAL CARE,LEVL I: ICD-10-PCS | Mod: ,,, | Performed by: INTERNAL MEDICINE

## 2023-10-28 PROCEDURE — 63600175 PHARM REV CODE 636 W HCPCS

## 2023-10-28 PROCEDURE — 94761 N-INVAS EAR/PLS OXIMETRY MLT: CPT

## 2023-10-28 PROCEDURE — 99231 SBSQ HOSP IP/OBS SF/LOW 25: CPT | Mod: ,,, | Performed by: INTERNAL MEDICINE

## 2023-10-28 RX ORDER — INSULIN ASPART 100 [IU]/ML
0-5 INJECTION, SOLUTION INTRAVENOUS; SUBCUTANEOUS
Status: DISCONTINUED | OUTPATIENT
Start: 2023-10-28 | End: 2023-11-01 | Stop reason: HOSPADM

## 2023-10-28 RX ORDER — IBUPROFEN 200 MG
24 TABLET ORAL
Status: DISCONTINUED | OUTPATIENT
Start: 2023-10-28 | End: 2023-11-01 | Stop reason: HOSPADM

## 2023-10-28 RX ORDER — IBUPROFEN 200 MG
16 TABLET ORAL
Status: DISCONTINUED | OUTPATIENT
Start: 2023-10-28 | End: 2023-11-01 | Stop reason: HOSPADM

## 2023-10-28 RX ORDER — GLUCAGON 1 MG
1 KIT INJECTION
Status: DISCONTINUED | OUTPATIENT
Start: 2023-10-28 | End: 2023-11-01 | Stop reason: HOSPADM

## 2023-10-28 RX ADMIN — DOBUTAMINE HYDROCHLORIDE 5 MCG/KG/MIN: 400 INJECTION INTRAVENOUS at 12:10

## 2023-10-28 RX ADMIN — SACUBITRIL AND VALSARTAN 1 TABLET: 24; 26 TABLET, FILM COATED ORAL at 08:10

## 2023-10-28 RX ADMIN — APIXABAN 5 MG: 5 TABLET, FILM COATED ORAL at 08:10

## 2023-10-28 RX ADMIN — SACUBITRIL AND VALSARTAN 1 TABLET: 24; 26 TABLET, FILM COATED ORAL at 03:10

## 2023-10-28 RX ADMIN — INSULIN ASPART 1 UNITS: 100 INJECTION, SOLUTION INTRAVENOUS; SUBCUTANEOUS at 08:10

## 2023-10-28 RX ADMIN — AMIODARONE HYDROCHLORIDE 400 MG: 200 TABLET ORAL at 08:10

## 2023-10-28 RX ADMIN — PANTOPRAZOLE SODIUM 40 MG: 40 TABLET, DELAYED RELEASE ORAL at 06:10

## 2023-10-28 RX ADMIN — ASPIRIN 81 MG: 81 TABLET, COATED ORAL at 08:10

## 2023-10-28 RX ADMIN — ATORVASTATIN CALCIUM 40 MG: 40 TABLET, FILM COATED ORAL at 08:10

## 2023-10-28 RX ADMIN — VALSARTAN 20 MG: 40 TABLET, FILM COATED ORAL at 08:10

## 2023-10-28 NOTE — ASSESSMENT & PLAN NOTE
Last hgb a1c 7.6.  No home meds.  BG since admission rising to 295 as of 10/28.  Starting LDSSI, will titrate up to goal 140-180.

## 2023-10-28 NOTE — SUBJECTIVE & OBJECTIVE
Interval History:   Wife charting UOP at bedside, reports > 1.2 L UOP in interval   BPs indicate he is tolerating valsartan so far  Pt walking down our halls without sxs  Seeking to refer to [facility] in [place]    Objective:     Vital Signs (Most Recent):  Temp: 98 °F (36.7 °C) (10/28/23 1125)  Pulse: 78 (10/28/23 1125)  Resp: 18 (10/28/23 1125)  BP: (!) 99/53 (10/28/23 1125)  SpO2: (!) 93 % (10/28/23 1125) Vital Signs (24h Range):  Temp:  [97.1 °F (36.2 °C)-98 °F (36.7 °C)] 98 °F (36.7 °C)  Pulse:  [75-91] 78  Resp:  [18-19] 18  SpO2:  [93 %-100 %] 93 %  BP: ()/(53-71) 99/53        There is no height or weight on file to calculate BMI.     SpO2: (!) 93 %         Intake/Output Summary (Last 24 hours) at 10/28/2023 1335  Last data filed at 10/28/2023 0635  Gross per 24 hour   Intake --   Output 600 ml   Net -600 ml       Lines/Drains/Airways       Peripherally Inserted Central Catheter Line  Duration             PICC Double Lumen 10/13/23 1542 right basilic 14 days                       Physical Exam  Constitutional:       Appearance: Normal appearance. He is normal weight. He is not ill-appearing.   HENT:      Head: Atraumatic.   Neck:      Comments: No elevated JVD  Cardiovascular:      Rate and Rhythm: Normal rate.      Heart sounds: No murmur heard.  Pulmonary:      Effort: Pulmonary effort is normal.      Breath sounds: Normal breath sounds.   Abdominal:      General: There is distension.      Palpations: Abdomen is soft.   Musculoskeletal:      Right lower leg: Edema present.      Left lower leg: Edema present.   Skin:     General: Skin is warm.   Neurological:      Mental Status: He is alert.            Significant Labs: CMP   Recent Labs   Lab 10/27/23  0915 10/27/23  1906 10/28/23  0938    137 138   K 3.9 4.0 3.5    105 103   CO2 21* 19* 21*   * 205* 295*   BUN 39* 39* 33*   CREATININE 1.8* 2.1* 2.3*   CALCIUM 9.0 8.6* 9.0   PROT 6.6  --  6.9   ALBUMIN 3.3*   --  3.4*   BILITOT 0.9  --  0.7   ALKPHOS 68  --  72   AST 14  --  13   ALT 17  --  15   ANIONGAP 11 13 14    and CBC   Recent Labs   Lab 10/27/23  0915 10/27/23  1125 10/28/23  0938   WBC 3.56*  --  3.51*   HGB 9.9*  --  10.1*   HCT 28.5*   < > 29.8*     --  261    < > = values in this interval not displayed.       Significant Imaging:  Reviewed

## 2023-10-28 NOTE — ASSESSMENT & PLAN NOTE
Patient with Paroxysmal (<7 days) atrial fibrillation which is controlled currently with Beta Blocker. Patient is currently in atrial fibrillation.BOBDL1LEHg Score: 2. HASBLED Score: . Anticoagulation indicated. Anticoagulation done with heparin.

## 2023-10-28 NOTE — ASSESSMENT & PLAN NOTE
2/2 iCM  Last TTE on 10/26 with: EF 15-20%, LVIDd 7.1 cm. Normal RV size with normal RVSF. mild AS, mild to mod MR. CVP 8 mmHg  Last  bl ~400  CXR with vascular congestion  On home  5  Currently not a candidate for advanced options [place] [facility] [place] to [place] ([employer]; [facility] is not in their network)    · GDMT as tolerated: Transitioning valsartan to Entresto today  · Continue diuresis with Lasix gtt at 10/hr  · Exploring stronger home diuresis regimen  · Strict I&O  · Continue  gtt at 5/hr  · Replete electrolytes as needed  · Family will need to contact one of the in-network centers to initiate transplant workup; [facility] in [place] is a possibility that CM/CRISTAL will explore on Monday

## 2023-10-28 NOTE — ASSESSMENT & PLAN NOTE
Compensated, BP marginal  Holding diuretics were with increasing creatinine may need to reinstitute  Cardiology following    Echo    Result Date: 10/19/2023    Left Ventricle: The left ventricle is severely dilated. Moderately   increased ventricular mass. Normal wall thickness. There is moderate   eccentric hypertrophy. regional wall motion abnormalities present. There   is severely reduced systolic function with a visually estimated ejection   fraction of 15 - 20%. Biplane (2D) method of discs ejection fraction is   18%. There is diastolic dysfunction.    Left Atrium: Left atrium is severely dilated.    Right Ventricle: Normal right ventricular cavity size. Systolic   function is normal.    Aortic Valve: There is mild aortic valve sclerosis.    Mitral Valve: There is mild to moderate regurgitation.    IVC/SVC: Intermediate venous pressure at 8 mmHg.      cardiac recommendations regarding diuretics, heart catheterization, dobutamine/Milrinone infusion    Patient remained prerenal and was transferred to [facility] for advanced heart failure evaluation

## 2023-10-28 NOTE — ASSESSMENT & PLAN NOTE
Patient's FSGs are uncontrolled due to hyperglycemia on current medication regimen.  Last A1c reviewed-   Lab Results   Component Value Date    HGBA1C 7.6 (H) 10/12/2023     Most recent fingerstick glucose reviewed-   Current correctional scale  Medium  Maintain anti-hyperglycemic dose as follows-   Antihyperglycemics (From admission, onward)    None        Hold Oral hypoglycemics while patient is in the hospital.

## 2023-10-28 NOTE — ASSESSMENT & PLAN NOTE
Patient's renal function is stable and likely at baseline    Renal ultrasound unremarkable    Likely secondary to ongoing CHF

## 2023-10-28 NOTE — DISCHARGE SUMMARY
[facility] Medicine  Discharge Summary      Patient Name: Radha Abbott  MRN: [id number]  MATTHEW: [id number]  Patient Class: IP- Inpatient  Admission Date: 10/7/2023  Hospital Length of Stay: 5 days  Discharge Date and Time: 10/12/2023  4:45 PM  Attending Physician: No att. providers found   Discharging Provider: Mery Randall MD  Primary Care Provider: Vasu Kong MD    Primary Care Team: [facility] MEDICINE A    HPI:   64 y/o male with PMHx of CABG, CAD, HTN, HLD and defibrillator developed near syncope 4 days ago and just as he was passing out his defibrillator shocked him and he fully recovered. He did not seek help. This am he noticed a buzzing sound and came to get it checked. He has family at bedside. He is asymptomatic. Patient admitted to ICU on 10/7/23. Cardiology consulted - noted V-fib episodes on ICD interrogation, started on IV amiodarone.  ----  Patient noted with 20 beat v-tach overnight  Amiodarone IV transitioned to PO  Marginal BP this morning, BP meds held  Cardiology following      Procedure(s) (LRB):  INSERTION, CATHETER, RIGHT HEART (Right)      Hospital Course:   Patient transitioned out of ICU.  He has been seen by Cardiology and EP has been consulted.  Had a 20 beat run of V-tach in early morning of 10/9.    Patient has a right heart catheterization with low pressures started on Milrinone drip overnight went into AFib with RVR currently is on heparin drip  Patient was initiated on IV Lasix with improvement in his diuresis however he is more prerenal.  The case was discussed with the advanced heart failure team and he was transferred to [facility] for further treatment.  Patient seen and examined on day of discharge and stable for transfer to [facility] for further treatment of his heart failure.       Goals of Care Treatment Preferences:  Code Status: Full Code      Consults:   Consults (From admission, onward)        Status Ordering Provider      Inpatient consult to Registered Dietitian/Nutritionist  Once        Provider:  (Not yet assigned)    Completed MERY GHOSH     Inpatient consult to Hospitalist  Once        Provider:  Mery Ghosh MD    Completed LEE ANN CORDOVA     Inpatient consult to Cardiology  Once        Provider:  Luis M Moreau MD    Completed LEE ANN CORDOVA     Inpatient consult to Cardiology  Once        Provider:  Luis M Moreau MD    Completed LANA JOSEPH          Cardiac/Vascular  * Ventricular tachycardia  Admitted to ICU, amiodarone IV transitioned to PO  ICD interrogation > 2000 episodes of NSVT in the last few months  Cardiology following consulted EP for wait to see recs in a.m.    EP  evaluated and we will continue await final recommendations    Transferred to [facility] for advanced heart failure evaluation    Defibrillator discharge        PAF (paroxysmal atrial fibrillation)  Patient with Paroxysmal (<7 days) atrial fibrillation which is controlled currently with Beta Blocker. Patient is currently in atrial fibrillation.[id number] Score: 2. HASBLED Score: . Anticoagulation indicated. Anticoagulation done with heparin.    HFrEF (heart failure with reduced ejection fraction)  Compensated, BP marginal  Holding diuretics were with increasing creatinine may need to reinstitute  Cardiology following    Echo    Result Date: 10/19/2023    Left Ventricle: The left ventricle is severely dilated. Moderately   increased ventricular mass. Normal wall thickness. There is moderate   eccentric hypertrophy. regional wall motion abnormalities present. There   is severely reduced systolic function with a visually estimated ejection   fraction of 15 - 20%. Biplane (2D) method of discs ejection fraction is   18%. There is diastolic dysfunction.    Left Atrium: Left atrium is severely dilated.    Right Ventricle: Normal right ventricular cavity size. Systolic   function is normal.    Aortic Valve:  There is mild aortic valve sclerosis.    Mitral Valve: There is mild to moderate regurgitation.    IVC/SVC: Intermediate venous pressure at 8 mmHg.      cardiac recommendations regarding diuretics, heart catheterization, dobutamine/Milrinone infusion    Patient remained prerenal and was transferred to [facility] for advanced heart failure evaluation    CAD (coronary artery disease)    Pain-free, troponin likely elevated secondary to VFib with defibrillation  We will need ischemic workup sometime in the future however with elevated creatinine we will have to discuss with Cardiology timing  Continue current medication of aspirin Plavix beta-blocker and statin    Renal/  IVÁN (acute kidney injury)  Patient's renal function is stable and likely at baseline    Renal ultrasound unremarkable    Likely secondary to ongoing CHF    Hypokalemia  PRN repletion orders in place  Monitor labs      Endocrine  Type 2 diabetes mellitus with diabetic polyneuropathy, without long-term current use of insulin  Patient's FSGs are uncontrolled due to hyperglycemia on current medication regimen.  Last A1c reviewed-   Lab Results   Component Value Date    HGBA1C 7.6 (H) 10/12/2023     Most recent fingerstick glucose reviewed-   Current correctional scale  Medium  Maintain anti-hyperglycemic dose as follows-   Antihyperglycemics (From admission, onward)    None        Hold Oral hypoglycemics while patient is in the hospital.      Final Active Diagnoses:    Diagnosis Date Noted POA    PRINCIPAL PROBLEM:  Ventricular tachycardia [I47.20] 10/07/2023 Yes    Defibrillator discharge [Z45.02] 10/18/2023 Not Applicable    PAF (paroxysmal atrial fibrillation) [I48.0] 10/11/2023 Unknown    Hypokalemia [E87.6] 10/08/2023 No    IVÁN (acute kidney injury) [N17.9] 10/08/2023 Yes    CAD (coronary artery disease) [I25.10] 10/07/2023 Yes    HFrEF (heart failure with reduced ejection fraction) [I50.20] 10/07/2023 Yes    Type 2 diabetes  mellitus with diabetic polyneuropathy, without long-term current use of insulin [E11.42] 10/07/2023 Yes      Problems Resolved During this Admission:       Discharged Condition: stable    Disposition: Another Health Care Inst*    Follow Up:    Patient Instructions:   No discharge procedures on file.  Imaging Results          X-Ray Chest 1 View (Final result)  Result time 10/07/23 10:29:25    Final result by Pj Velarde MD (10/07/23 10:29:25)                 Impression:      No acute process seen.      Electronically signed by: Pj Velarde MD  Date:    10/07/2023  Time:    10:29             Narrative:    EXAMINATION:  XR CHEST 1 VIEW    CLINICAL HISTORY:  Encounter for adjustment and management of automatic implantable cardiac defibrillator    FINDINGS:  Single view of the chest.  Comparison 02/07/2018.  Left-sided generator and defibrillator wires demonstrates similar configuration.    Cardiac silhouette is normal.  The lungs demonstrate no evidence of active disease.  No evidence of pleural effusion or pneumothorax.  Bones appear intact.  Moderate degenerative changes and moderate atherosclerotic disease.  Sternotomy wires are intact.                                Significant Diagnostic Studies: Labs: All labs within the past 24 hours have been reviewed    Pending Diagnostic Studies:     None         Medications:  Transfer Medications (for Discharge Readmit only):   No current facility-administered medications for this encounter.     No current outpatient medications on file.     Facility-Administered Medications Ordered in Other Encounters   Medication Dose Route Frequency Provider Last Rate Last Admin    acetaminophen tablet 650 mg  650 mg Oral Q6H PRN Gianna Swan MD   650 mg at 10/26/23 2203    amiodarone tablet 400 mg  400 mg Oral Daily Nabila Swan MD   400 mg at 10/28/23 0830    apixaban tablet 5 mg  5 mg Oral BID Nabila Swan MD   5 mg at 10/28/23 0830    aspirin EC tablet 81  mg  81 mg Oral Daily Nabila Swan MD   81 mg at 10/28/23 0830    atorvastatin tablet 40 mg  40 mg Oral QHS Nabila Swan MD   40 mg at 10/27/23 2123    dextrose 10% bolus 125 mL 125 mL  12.5 g Intravenous PRN Nic Mares MD        dextrose 10% bolus 250 mL 250 mL  25 g Intravenous PRN Nic Mares MD        DOBUtamine 1000 mg in D5W 250 mL infusion  5 mcg/kg/min Intravenous Continuous Nabila Swan MD 5.8 mL/hr at 10/28/23 1228 5 mcg/kg/min at 10/28/23 1228    furosemide (LASIX) 500 mg in 50 mL infusion (conc: 10 mg/mL)  10 mg/hr Intravenous Continuous Nabila Swan MD 1 mL/hr at 10/27/23 0932 10 mg/hr at 10/27/23 0932    gabapentin capsule 300 mg  300 mg Oral Nightly PRN Nabila Swan MD        glucagon (human recombinant) injection 1 mg  1 mg Intramuscular PRN Nic Mares MD        glucose chewable tablet 16 g  16 g Oral PRN Nic Mares MD        glucose chewable tablet 24 g  24 g Oral PRN Nic Mares MD        insulin aspart U-100 pen 0-5 Units  0-5 Units Subcutaneous QID (AC + HS) PRNic Dailey MD        pantoprazole EC tablet 40 mg  40 mg Oral Before breakfast Yudy Stockton MD   40 mg at 10/28/23 0600    sacubitriL-valsartan 24-26 mg per tablet 1 tablet  1 tablet Oral BID Nabila Sawn MD   1 tablet at 10/28/23 1527    sodium chloride 0.9% flush 10 mL  10 mL Intravenous PRN Nabila Swan MD           Indwelling Lines/Drains at time of discharge:   Lines/Drains/Airways     None                 Time spent on the discharge of patient: 55 minutes         Mery Randall MD  Department of Hospital Medicine  [facility] - Med Surg

## 2023-10-28 NOTE — PROGRESS NOTES
[facility] - Cardiology Stepdown  Cardiology  Progress Note    Patient Name: Radha Abbott  MRN: [id number]  Admission Date: 10/26/2023  Hospital Length of Stay: 2 days  Code Status: Full Code   Attending Physician: Dmitry Mayo MD   Primary Care Physician: Vasu Kong MD  Expected Discharge Date: 10/29/2023  Principal Problem:Acute on chronic combined systolic and diastolic CHF, NYHA class 4    Subjective:     Hospital Course:   He had good UOP on Lasix gtt of about 1.5L/day, and within 2 days reported feeling at his baseline.  Wife is working with CM/SW on in-[employer]-network centers, and thinks [facility] in [facility] may be a good candidate.      Interval History:    Wife charting UOP at bedside, reports > 1.2 L UOP in interval    BPs indicate he is tolerating valsartan so far   BG rising: starting LDSSI and will titrate up for goal 140-180   Pt walking down our halls without sxs   Seeking to refer to [facility] in [place]    Objective:     Vital Signs (Most Recent):  Temp: 98 °F (36.7 °C) (10/28/23 1125)  Pulse: 78 (10/28/23 1125)  Resp: 18 (10/28/23 1125)  BP: (!) 99/53 (10/28/23 1125)  SpO2: (!) 93 % (10/28/23 1125) Vital Signs (24h Range):  Temp:  [97.1 °F (36.2 °C)-98 °F (36.7 °C)] 98 °F (36.7 °C)  Pulse:  [75-91] 78  Resp:  [18-19] 18  SpO2:  [93 %-100 %] 93 %  BP: ()/(53-71) 99/53        There is no height or weight on file to calculate BMI.     SpO2: (!) 93 %         Intake/Output Summary (Last 24 hours) at 10/28/2023 1335  Last data filed at 10/28/2023 0635  Gross per 24 hour   Intake --   Output 600 ml   Net -600 ml       Lines/Drains/Airways       Peripherally Inserted Central Catheter Line  Duration             PICC Double Lumen 10/13/23 1542 right basilic 14 days                       Physical Exam  Constitutional:       Appearance: Normal appearance. He is normal weight. He is not ill-appearing.   HENT:      Head:  Atraumatic.   Neck:      Comments: No elevated JVD  Cardiovascular:      Rate and Rhythm: Normal rate.      Heart sounds: No murmur heard.  Pulmonary:      Effort: Pulmonary effort is normal.      Breath sounds: Normal breath sounds.   Abdominal:      General: There is distension.      Palpations: Abdomen is soft.   Musculoskeletal:      Right lower leg: Edema present.      Left lower leg: Edema present.   Skin:     General: Skin is warm.   Neurological:      Mental Status: He is alert.            Significant Labs: CMP   Recent Labs   Lab 10/27/23  0915 10/27/23  1906 10/28/23  0938    137 138   K 3.9 4.0 3.5    105 103   CO2 21* 19* 21*   * 205* 295*   BUN 39* 39* 33*   CREATININE 1.8* 2.1* 2.3*   CALCIUM 9.0 8.6* 9.0   PROT 6.6  --  6.9   ALBUMIN 3.3*  --  3.4*   BILITOT 0.9  --  0.7   ALKPHOS 68  --  72   AST 14  --  13   ALT 17  --  15   ANIONGAP 11 13 14    and CBC   Recent Labs   Lab 10/27/23  0915 10/27/23  1125 10/28/23  0938   WBC 3.56*  --  3.51*   HGB 9.9*  --  10.1*   HCT 28.5*   < > 29.8*     --  261    < > = values in this interval not displayed.       Significant Imaging:  Reviewed    Assessment and Plan:     * Acute on chronic combined systolic and diastolic CHF, NYHA class 4  2/2 iCM  Last TTE on 10/26 with: EF 15-20%, LVIDd 7.1 cm. Normal RV size with normal RVSF. mild AS, mild to mod MR. CVP 8 mmHg  Last  bl ~400  CXR with vascular congestion  On home  5  Currently not a candidate for [place] [facility] [place] to [place] ([employer]; [facility] is not in their network)    · GDMT as tolerated: Transitioning valsartan to Entresto today  · Continue diuresis with Lasix gtt at 10/hr  · Exploring stronger home diuresis regimen  · Strict I&O  · Continue  gtt at 5/hr  · Replete electrolytes as needed  · Family will need to contact one of the in-network centers to initiate transplant workup; [facility] in [place] is a possibility that CM/SW will  explore on Monday        Paroxysmal atrial fibrillation  Will cont amiodarone (currently on VT dose)  Cont AC with Eliquis    Type 2 diabetes mellitus with diabetic polyneuropathy, without long-term current use of insulin  Last hgb a1c 7.6.  No home meds.  BG since admission rising to 295 as of 10/28.  Starting LDSSI, will titrate up to goal 140-180.    HFrEF (heart failure with reduced ejection fraction)        Ventricular tachycardia  H/o VT  On amiodarone 400 qd- Will cont  Tele monitoring        VTE Risk Mitigation (From admission, onward)         Ordered     apixaban tablet 5 mg  2 times daily         10/26/23 1857     IP VTE HIGH RISK PATIENT  Once         10/26/23 1852     Place sequential compression device  Until discontinued         10/26/23 1852                Nic Mraes MD  Cardiology  [facility] - Cardiology Stepdown

## 2023-10-28 NOTE — HOSPITAL COURSE
He has had good UOP on Lasix gtt of about 1.5L/day, and within 2 days reported feeling at his baseline.  Wife is working with CM/SW on in-[employer]-network centers: [facility] in [place] appeared to be the leading option, but they denied him.  GDMT was titrated back on and up: he tolerated valsartan 20 well so this was transitioned to Entresto 24-26.  His CVP trended down to 4 and .  Our transplant coordinator joined the search for an accepting center; there was confusion about [employer] role in the process.  They are the transplant benefit manager for his [employer] plan.  We are continuing to seek auth for him.  In the meantime, since he is back to his baseline we are discharging him home in stable condition, with follow-up with HTS in clinic.

## 2023-10-28 NOTE — ASSESSMENT & PLAN NOTE
Admitted to ICU, amiodarone IV transitioned to PO  ICD interrogation > 2000 episodes of NSVT in the last few months  Cardiology following consulted EP for wait to see recs in a.m.    EP  evaluated and we will continue await final recommendations    Transferred to [facility] for advanced heart failure evaluation

## 2023-10-29 LAB
ALBUMIN SERPL BCP-MCNC: 3.4 G/DL (ref 3.5–5.2)
ALP SERPL-CCNC: 73 U/L (ref 55–135)
ALT SERPL W/O P-5'-P-CCNC: 14 U/L (ref 10–44)
ANION GAP SERPL CALC-SCNC: 14 MMOL/L (ref 8–16)
AST SERPL-CCNC: 13 U/L (ref 10–40)
BASOPHILS # BLD AUTO: 0.06 K/UL (ref 0–0.2)
BASOPHILS NFR BLD: 1.4 % (ref 0–1.9)
BILIRUB SERPL-MCNC: 0.6 MG/DL (ref 0.1–1)
BUN SERPL-MCNC: 36 MG/DL (ref 8–23)
CALCIUM SERPL-MCNC: 8.8 MG/DL (ref 8.7–10.5)
CHLORIDE SERPL-SCNC: 104 MMOL/L (ref 95–110)
CO2 SERPL-SCNC: 19 MMOL/L (ref 23–29)
CREAT SERPL-MCNC: 2 MG/DL (ref 0.5–1.4)
DIFFERENTIAL METHOD: ABNORMAL
EOSINOPHIL # BLD AUTO: 0.3 K/UL (ref 0–0.5)
EOSINOPHIL NFR BLD: 5.7 % (ref 0–8)
ERYTHROCYTE [DISTWIDTH] IN BLOOD BY AUTOMATED COUNT: 13.6 % (ref 11.5–14.5)
EST. GFR  (NO RACE VARIABLE): 37.3 ML/MIN/1.73 M^2
GLUCOSE SERPL-MCNC: 145 MG/DL (ref 70–110)
HCT VFR BLD AUTO: 32 % (ref 40–54)
HGB BLD-MCNC: 10.8 G/DL (ref 14–18)
IMM GRANULOCYTES # BLD AUTO: 0.01 K/UL (ref 0–0.04)
IMM GRANULOCYTES NFR BLD AUTO: 0.2 % (ref 0–0.5)
LYMPHOCYTES # BLD AUTO: 1.3 K/UL (ref 1–4.8)
LYMPHOCYTES NFR BLD: 29.4 % (ref 18–48)
MAGNESIUM SERPL-MCNC: 2.1 MG/DL (ref 1.6–2.6)
MCH RBC QN AUTO: 30.1 PG (ref 27–31)
MCHC RBC AUTO-ENTMCNC: 33.8 G/DL (ref 32–36)
MCV RBC AUTO: 89 FL (ref 82–98)
MONOCYTES # BLD AUTO: 0.4 K/UL (ref 0.3–1)
MONOCYTES NFR BLD: 10.1 % (ref 4–15)
NEUTROPHILS # BLD AUTO: 2.3 K/UL (ref 1.8–7.7)
NEUTROPHILS NFR BLD: 53.2 % (ref 38–73)
NRBC BLD-RTO: 0 /100 WBC
PHOSPHATE SERPL-MCNC: 3.6 MG/DL (ref 2.7–4.5)
PLATELET # BLD AUTO: 290 K/UL (ref 150–450)
PMV BLD AUTO: 10.8 FL (ref 9.2–12.9)
POCT GLUCOSE: 158 MG/DL (ref 70–110)
POCT GLUCOSE: 210 MG/DL (ref 70–110)
POCT GLUCOSE: 212 MG/DL (ref 70–110)
POCT GLUCOSE: 248 MG/DL (ref 70–110)
POTASSIUM SERPL-SCNC: 4.1 MMOL/L (ref 3.5–5.1)
PROT SERPL-MCNC: 6.9 G/DL (ref 6–8.4)
RBC # BLD AUTO: 3.59 M/UL (ref 4.6–6.2)
SODIUM SERPL-SCNC: 137 MMOL/L (ref 136–145)
WBC # BLD AUTO: 4.36 K/UL (ref 3.9–12.7)

## 2023-10-29 PROCEDURE — 63600175 PHARM REV CODE 636 W HCPCS: Performed by: STUDENT IN AN ORGANIZED HEALTH CARE EDUCATION/TRAINING PROGRAM

## 2023-10-29 PROCEDURE — 83735 ASSAY OF MAGNESIUM: CPT

## 2023-10-29 PROCEDURE — 84100 ASSAY OF PHOSPHORUS: CPT

## 2023-10-29 PROCEDURE — 20600001 HC STEP DOWN PRIVATE ROOM

## 2023-10-29 PROCEDURE — 85025 COMPLETE CBC W/AUTO DIFF WBC: CPT

## 2023-10-29 PROCEDURE — 25000003 PHARM REV CODE 250: Performed by: STUDENT IN AN ORGANIZED HEALTH CARE EDUCATION/TRAINING PROGRAM

## 2023-10-29 PROCEDURE — 80053 COMPREHEN METABOLIC PANEL: CPT

## 2023-10-29 PROCEDURE — 99231 PR SUBSEQUENT HOSPITAL CARE,LEVL I: ICD-10-PCS | Mod: ,,, | Performed by: INTERNAL MEDICINE

## 2023-10-29 PROCEDURE — 99231 SBSQ HOSP IP/OBS SF/LOW 25: CPT | Mod: ,,, | Performed by: INTERNAL MEDICINE

## 2023-10-29 RX ORDER — FUROSEMIDE 10 MG/ML
40 INJECTION INTRAMUSCULAR; INTRAVENOUS
Status: DISCONTINUED | OUTPATIENT
Start: 2023-10-29 | End: 2023-10-30

## 2023-10-29 RX ORDER — POTASSIUM CHLORIDE 750 MG/1
30 CAPSULE, EXTENDED RELEASE ORAL ONCE
Status: COMPLETED | OUTPATIENT
Start: 2023-10-29 | End: 2023-10-29

## 2023-10-29 RX ADMIN — INSULIN ASPART 2 UNITS: 100 INJECTION, SOLUTION INTRAVENOUS; SUBCUTANEOUS at 12:10

## 2023-10-29 RX ADMIN — INSULIN ASPART 1 UNITS: 100 INJECTION, SOLUTION INTRAVENOUS; SUBCUTANEOUS at 08:10

## 2023-10-29 RX ADMIN — AMIODARONE HYDROCHLORIDE 400 MG: 200 TABLET ORAL at 08:10

## 2023-10-29 RX ADMIN — FUROSEMIDE 40 MG: 10 INJECTION, SOLUTION INTRAVENOUS at 08:10

## 2023-10-29 RX ADMIN — ATORVASTATIN CALCIUM 40 MG: 40 TABLET, FILM COATED ORAL at 08:10

## 2023-10-29 RX ADMIN — SACUBITRIL AND VALSARTAN 1 TABLET: 24; 26 TABLET, FILM COATED ORAL at 08:10

## 2023-10-29 RX ADMIN — POTASSIUM CHLORIDE 30 MEQ: 10 CAPSULE, COATED, EXTENDED RELEASE ORAL at 03:10

## 2023-10-29 RX ADMIN — APIXABAN 5 MG: 5 TABLET, FILM COATED ORAL at 08:10

## 2023-10-29 RX ADMIN — ASPIRIN 81 MG: 81 TABLET, COATED ORAL at 08:10

## 2023-10-29 RX ADMIN — INSULIN ASPART 2 UNITS: 100 INJECTION, SOLUTION INTRAVENOUS; SUBCUTANEOUS at 08:10

## 2023-10-29 RX ADMIN — FUROSEMIDE 10 MG/HR: 10 INJECTION, SOLUTION INTRAMUSCULAR; INTRAVENOUS at 02:10

## 2023-10-29 RX ADMIN — PANTOPRAZOLE SODIUM 40 MG: 40 TABLET, DELAYED RELEASE ORAL at 05:10

## 2023-10-29 NOTE — PLAN OF CARE
Problem: Diabetes Comorbidity  Goal: Blood Glucose Level Within Targeted Range  Outcome: Ongoing, Progressing     Problem: Oral Intake Inadequate (Acute Kidney Injury/Impairment)  Goal: Optimal Nutrition Intake  Outcome: Ongoing, Progressing     Problem: Renal Function Impairment (Acute Kidney Injury/Impairment)  Goal: Effective Renal Function  Outcome: Ongoing, Progressing     Problem: Infection  Goal: Absence of Infection Signs and Symptoms  Outcome: Ongoing, Progressing     Problem: Fall Injury Risk  Goal: Absence of Fall and Fall-Related Injury  Outcome: Ongoing, Progressing

## 2023-10-29 NOTE — ASSESSMENT & PLAN NOTE
2/2 iCM  Last TTE on 10/26 with: EF 15-20%, LVIDd 7.1 cm. Normal RV size with normal RVSF. mild AS, mild to mod MR. CVP 8 mmHg  Last  bl ~400  CXR with vascular congestion  On home  5  Currently not a candidate for advanced options at [facility] due to insurance ([employer]; [facility] is not in their network)    · GDMT as tolerated: Continue Entresto  · Continue diuresis with Lasix 40 IV BID  · Rechecking BNP tomorrow AM  · Exploring stronger home diuresis regimen  · Strict I&O  · Continue  gtt at 5/hr  · Replete electrolytes as needed  · Family will need to contact one of the in-network centers to initiate transplant workup; [facility] and [facility] in [place] are possibilities that CM/CRISTAL will explore on Monday

## 2023-10-29 NOTE — PROGRESS NOTES
[facility] - Cardiology Stepdown  Cardiology  Progress Note    Patient Name: Radha Abbott  MRN: [id number]  Admission Date: 10/26/2023  Hospital Length of Stay: 3 days  Code Status: Full Code   Attending Physician: Dmitry Mayo MD   Primary Care Physician: Vasu Kong MD  Expected Discharge Date: 10/29/2023  Principal Problem:Acute on chronic combined systolic and diastolic CHF, NYHA class 4    Subjective:     Hospital Course:   He has had good UOP on Lasix gtt of about 1.5L/day, and within 2 days reported feeling at his baseline.  Wife is working with CM/SW on [employer].  GDMT was titrated back on and up: he tolerated valsartan 20 well so this was transitioned to Entresto 24-26.      Interval History: NAEON.  Continued to have good UOP overnight; CVP now 4 so moving from Lasix gtt to pushes of 40 BID.    Objective:     Vital Signs (Most Recent):  Temp: 98.2 °F (36.8 °C) (10/29/23 0736)  Pulse: 74 (10/29/23 1000)  Resp: 18 (10/29/23 0736)  BP: (!) 100/57 (10/29/23 0736)  SpO2: 100 % (10/29/23 0736) Vital Signs (24h Range):  Temp:  [97 °F (36.1 °C)-98.7 °F (37.1 °C)] 98.2 °F (36.8 °C)  Pulse:  [66-83] 74  Resp:  [16-18] 18  SpO2:  [96 %-100 %] 100 %  BP: ()/(53-61) 100/57     Weight: 76.4 kg (168 lb 6.9 oz)  Body mass index is 22.84 kg/m².     SpO2: 100 %         Intake/Output Summary (Last 24 hours) at 10/29/2023 1157  Last data filed at 10/29/2023 0600  Gross per 24 hour   Intake 420 ml   Output 1900 ml   Net -1480 ml       Lines/Drains/Airways       Peripherally Inserted Central Catheter Line  Duration             PICC Double Lumen 10/13/23 1542 right basilic 15 days                       Physical Exam  Constitutional:       Appearance: Normal appearance. He is normal weight. He is not ill-appearing.      Comments: Sitting on couch   HENT:      Head: Atraumatic.   Neck:      Comments: No elevated JVD  Cardiovascular:      Rate and Rhythm: Normal rate.      Heart sounds: No murmur  heard.  Pulmonary:      Effort: Pulmonary effort is normal.      Breath sounds: Normal breath sounds.   Abdominal:      General: There is distension.      Palpations: Abdomen is soft.   Musculoskeletal:      Right lower leg: Edema present.      Left lower leg: Edema present.   Skin:     General: Skin is warm.   Neurological:      Mental Status: He is alert.            Significant Labs: CMP   Recent Labs   Lab 10/27/23  1906 10/28/23  0938 10/29/23  0542    138 137   K 4.0 3.5 4.1    103 104   CO2 19* 21* 19*   * 295* 145*   BUN 39* 33* 36*   CREATININE 2.1* 2.3* 2.0*   CALCIUM 8.6* 9.0 8.8   PROT  --  6.9 6.9   ALBUMIN  --  3.4* 3.4*   BILITOT  --  0.7 0.6   ALKPHOS  --  72 73   AST  --  13 13   ALT  --  15 14   ANIONGAP 13 14 14   , CBC   Recent Labs   Lab 10/28/23  0938 10/29/23  0542   WBC 3.51* 4.36   HGB 10.1* 10.8*   HCT 29.8* 32.0*    290   , and All pertinent lab results from the last 24 hours have been reviewed.    Significant Imaging:  Reviewed    Assessment and Plan:     * Acute on chronic combined systolic and diastolic CHF, NYHA class 4  2/2 iCM  Last TTE on 10/26 with: EF 15-20%, LVIDd 7.1 cm. Normal RV size with normal RVSF. mild AS, mild to mod MR. CVP 8 mmHg  Last  bl ~400  CXR with vascular congestion  On home  5  Currently not a candidate for advanced options at [facility] due to insurance ([employer]; [facility] is not in their network)    · GDMT as tolerated: Continue Entresto  · Continue diuresis with Lasix 40 IV BID  · Rechecking BNP tomorrow AM  · Exploring stronger home diuresis regimen  · Strict I&O  · Continue  gtt at 5/hr  · Replete electrolytes as needed  · Family will need to contact one of the in-network centers to initiate transplant workup; [facility] in [place] are possibilities that CM/SW will explore on Monday        Paroxysmal atrial fibrillation  Will cont amiodarone (currently on VT dose)  Cont AC with  Eliquis    Type 2 diabetes mellitus with diabetic polyneuropathy, without long-term current use of insulin  Last hgb a1c 7.6.  No home meds.  BG since admission rising to 295 as of 10/28.  Starting LDSSI, will titrate up to goal 140-180.    HFrEF (heart failure with reduced ejection fraction)        Ventricular tachycardia  H/o VT  On amiodarone 400 qd- Will cont  Tele monitoring        VTE Risk Mitigation (From admission, onward)         Ordered     apixaban tablet 5 mg  2 times daily         10/26/23 1857     IP VTE HIGH RISK PATIENT  Once         10/26/23 1852     Place sequential compression device  Until discontinued         10/26/23 1852                Nic Mares MD  Cardiology  [facility] - Cardiology Stepdown

## 2023-10-29 NOTE — NURSING
Plan of care reviewed with patient. Patient is AOX4 and VS stable. Patient remained free of falls and trauma, fall precautions are in place. Patient is ambulating independently. Patient has no complaints of pain. Patient has no questions at this time. Wheels are locked and the bed is in lowest position. The call bell is within reach. Telemetry is on.

## 2023-10-29 NOTE — SUBJECTIVE & OBJECTIVE
Interval History: KAREN.  Continued to have good UOP overnight; CVP now 4 so moving from Lasix gtt to pushes of 40 BID.    Objective:     Vital Signs (Most Recent):  Temp: 98.2 °F (36.8 °C) (10/29/23 0736)  Pulse: 74 (10/29/23 1000)  Resp: 18 (10/29/23 0736)  BP: (!) 100/57 (10/29/23 0736)  SpO2: 100 % (10/29/23 0736) Vital Signs (24h Range):  Temp:  [97 °F (36.1 °C)-98.7 °F (37.1 °C)] 98.2 °F (36.8 °C)  Pulse:  [66-83] 74  Resp:  [16-18] 18  SpO2:  [96 %-100 %] 100 %  BP: ()/(53-61) 100/57     Weight: 76.4 kg (168 lb 6.9 oz)  Body mass index is 22.84 kg/m².     SpO2: 100 %         Intake/Output Summary (Last 24 hours) at 10/29/2023 1157  Last data filed at 10/29/2023 0600  Gross per 24 hour   Intake 420 ml   Output 1900 ml   Net -1480 ml       Lines/Drains/Airways       Peripherally Inserted Central Catheter Line  Duration             PICC Double Lumen 10/13/23 1542 right basilic 15 days                       Physical Exam  Constitutional:       Appearance: Normal appearance. He is normal weight. He is not ill-appearing.      Comments: Sitting on couch   HENT:      Head: Atraumatic.   Neck:      Comments: No elevated JVD  Cardiovascular:      Rate and Rhythm: Normal rate.      Heart sounds: No murmur heard.  Pulmonary:      Effort: Pulmonary effort is normal.      Breath sounds: Normal breath sounds.   Abdominal:      General: There is distension.      Palpations: Abdomen is soft.   Musculoskeletal:      Right lower leg: Edema present.      Left lower leg: Edema present.   Skin:     General: Skin is warm.   Neurological:      Mental Status: He is alert.            Significant Labs: CMP   Recent Labs   Lab 10/27/23  1906 10/28/23  0938 10/29/23  0542    138 137   K 4.0 3.5 4.1    103 104   CO2 19* 21* 19*   * 295* 145*   BUN 39* 33* 36*   CREATININE 2.1* 2.3* 2.0*   CALCIUM 8.6* 9.0 8.8   PROT  --  6.9 6.9   ALBUMIN  --  3.4* 3.4*   BILITOT  --  0.7 0.6   ALKPHOS  --  72 73   AST  --  13 13    ALT  --  15 14   ANIONGAP 13 14 14   , CBC   Recent Labs   Lab 10/28/23  0938 10/29/23  0542   WBC 3.51* 4.36   HGB 10.1* 10.8*   HCT 29.8* 32.0*    290   , and All pertinent lab results from the last 24 hours have been reviewed.    Significant Imaging:  Reviewed   nonverbal cues (demo/gestures)/verbal cues

## 2023-10-29 NOTE — PROGRESS NOTES
10/29/23 0736   Vital Signs   BP (!) 100/57   MAP (mmHg) 72   BP Location Left arm   Patient Position Sitting     Hold Sacubitril-Valsartan per MD Vishnu.

## 2023-10-30 LAB
ALBUMIN SERPL BCP-MCNC: 3.3 G/DL (ref 3.5–5.2)
ALP SERPL-CCNC: 70 U/L (ref 55–135)
ALT SERPL W/O P-5'-P-CCNC: 13 U/L (ref 10–44)
ANION GAP SERPL CALC-SCNC: 13 MMOL/L (ref 8–16)
AST SERPL-CCNC: 13 U/L (ref 10–40)
BASOPHILS # BLD AUTO: 0.05 K/UL (ref 0–0.2)
BASOPHILS NFR BLD: 1.2 % (ref 0–1.9)
BILIRUB SERPL-MCNC: 0.5 MG/DL (ref 0.1–1)
BNP SERPL-MCNC: 343 PG/ML (ref 0–99)
BUN SERPL-MCNC: 42 MG/DL (ref 8–23)
CALCIUM SERPL-MCNC: 8.7 MG/DL (ref 8.7–10.5)
CHLORIDE SERPL-SCNC: 104 MMOL/L (ref 95–110)
CO2 SERPL-SCNC: 17 MMOL/L (ref 23–29)
CREAT SERPL-MCNC: 2 MG/DL (ref 0.5–1.4)
DIFFERENTIAL METHOD: ABNORMAL
EOSINOPHIL # BLD AUTO: 0.2 K/UL (ref 0–0.5)
EOSINOPHIL NFR BLD: 3.8 % (ref 0–8)
ERYTHROCYTE [DISTWIDTH] IN BLOOD BY AUTOMATED COUNT: 13.7 % (ref 11.5–14.5)
EST. GFR  (NO RACE VARIABLE): 37.3 ML/MIN/1.73 M^2
GLUCOSE SERPL-MCNC: 167 MG/DL (ref 70–110)
HCT VFR BLD AUTO: 31.6 % (ref 40–54)
HGB BLD-MCNC: 10.8 G/DL (ref 14–18)
IMM GRANULOCYTES # BLD AUTO: 0.01 K/UL (ref 0–0.04)
IMM GRANULOCYTES NFR BLD AUTO: 0.2 % (ref 0–0.5)
LYMPHOCYTES # BLD AUTO: 1 K/UL (ref 1–4.8)
LYMPHOCYTES NFR BLD: 24.5 % (ref 18–48)
MAGNESIUM SERPL-MCNC: 2.1 MG/DL (ref 1.6–2.6)
MCH RBC QN AUTO: 30.4 PG (ref 27–31)
MCHC RBC AUTO-ENTMCNC: 34.2 G/DL (ref 32–36)
MCV RBC AUTO: 89 FL (ref 82–98)
MONOCYTES # BLD AUTO: 0.4 K/UL (ref 0.3–1)
MONOCYTES NFR BLD: 10.1 % (ref 4–15)
NEUTROPHILS # BLD AUTO: 2.5 K/UL (ref 1.8–7.7)
NEUTROPHILS NFR BLD: 60.2 % (ref 38–73)
NRBC BLD-RTO: 0 /100 WBC
PHOSPHATE SERPL-MCNC: 3.5 MG/DL (ref 2.7–4.5)
PLATELET # BLD AUTO: 273 K/UL (ref 150–450)
PMV BLD AUTO: 10.3 FL (ref 9.2–12.9)
POCT GLUCOSE: 211 MG/DL (ref 70–110)
POCT GLUCOSE: 216 MG/DL (ref 70–110)
POCT GLUCOSE: 307 MG/DL (ref 70–110)
POTASSIUM SERPL-SCNC: 4.2 MMOL/L (ref 3.5–5.1)
PROT SERPL-MCNC: 6.7 G/DL (ref 6–8.4)
RBC # BLD AUTO: 3.55 M/UL (ref 4.6–6.2)
SODIUM SERPL-SCNC: 134 MMOL/L (ref 136–145)
WBC # BLD AUTO: 4.16 K/UL (ref 3.9–12.7)

## 2023-10-30 PROCEDURE — 25000003 PHARM REV CODE 250

## 2023-10-30 PROCEDURE — 25000003 PHARM REV CODE 250: Performed by: INTERNAL MEDICINE

## 2023-10-30 PROCEDURE — 83735 ASSAY OF MAGNESIUM: CPT

## 2023-10-30 PROCEDURE — 25000003 PHARM REV CODE 250: Performed by: STUDENT IN AN ORGANIZED HEALTH CARE EDUCATION/TRAINING PROGRAM

## 2023-10-30 PROCEDURE — 80053 COMPREHEN METABOLIC PANEL: CPT

## 2023-10-30 PROCEDURE — 85025 COMPLETE CBC W/AUTO DIFF WBC: CPT

## 2023-10-30 PROCEDURE — 20600001 HC STEP DOWN PRIVATE ROOM

## 2023-10-30 PROCEDURE — 84100 ASSAY OF PHOSPHORUS: CPT

## 2023-10-30 PROCEDURE — 63600175 PHARM REV CODE 636 W HCPCS: Performed by: STUDENT IN AN ORGANIZED HEALTH CARE EDUCATION/TRAINING PROGRAM

## 2023-10-30 PROCEDURE — 83880 ASSAY OF NATRIURETIC PEPTIDE: CPT

## 2023-10-30 RX ORDER — FUROSEMIDE 40 MG/1
40 TABLET ORAL 2 TIMES DAILY
Status: DISCONTINUED | OUTPATIENT
Start: 2023-10-30 | End: 2023-11-01 | Stop reason: HOSPADM

## 2023-10-30 RX ADMIN — ACETAMINOPHEN 650 MG: 325 TABLET ORAL at 09:10

## 2023-10-30 RX ADMIN — FUROSEMIDE 40 MG: 40 TABLET ORAL at 05:10

## 2023-10-30 RX ADMIN — APIXABAN 5 MG: 5 TABLET, FILM COATED ORAL at 09:10

## 2023-10-30 RX ADMIN — GABAPENTIN 300 MG: 300 CAPSULE ORAL at 09:10

## 2023-10-30 RX ADMIN — AMIODARONE HYDROCHLORIDE 400 MG: 200 TABLET ORAL at 09:10

## 2023-10-30 RX ADMIN — SACUBITRIL AND VALSARTAN 1 TABLET: 24; 26 TABLET, FILM COATED ORAL at 09:10

## 2023-10-30 RX ADMIN — DOBUTAMINE HYDROCHLORIDE 5 MCG/KG/MIN: 400 INJECTION INTRAVENOUS at 10:10

## 2023-10-30 RX ADMIN — PANTOPRAZOLE SODIUM 40 MG: 40 TABLET, DELAYED RELEASE ORAL at 05:10

## 2023-10-30 RX ADMIN — INSULIN ASPART 2 UNITS: 100 INJECTION, SOLUTION INTRAVENOUS; SUBCUTANEOUS at 09:10

## 2023-10-30 RX ADMIN — INSULIN ASPART 2 UNITS: 100 INJECTION, SOLUTION INTRAVENOUS; SUBCUTANEOUS at 12:10

## 2023-10-30 RX ADMIN — ASPIRIN 81 MG: 81 TABLET, COATED ORAL at 09:10

## 2023-10-30 RX ADMIN — FUROSEMIDE 40 MG: 40 TABLET ORAL at 09:10

## 2023-10-30 RX ADMIN — ATORVASTATIN CALCIUM 40 MG: 40 TABLET, FILM COATED ORAL at 09:10

## 2023-10-30 NOTE — ASSESSMENT & PLAN NOTE
2/2 iCM  Last TTE on 10/26 with: EF 15-20%, LVIDd 7.1 cm. Normal RV size with normal RVSF. mild AS, mild to mod MR. CVP 8 mmHg  Last  bl ~400  CXR with vascular congestion  On home  5  Currently not a candidate for advanced options at [facility] due to insurance ([employer]; [facility] is not in their network)    · GDMT as tolerated: Continue Entresto  · Downgrading Lasix 40 IV BID to 40 PO BID  · Rechecking BNP tomorrow AM  · Exploring stronger home diuresis regimen  · Strict I&O  · Continue  gtt at 5/hr  · Replete electrolytes as needed  · Family will need to contact one of the in-network centers to initiate transplant workup; [facility] and [facility] in [place] are possibilities that CM/CRISTAL will explore on Monday

## 2023-10-30 NOTE — PROGRESS NOTES
[facility]  Cardiology  Progress Note    Patient Name: Radha Abbott  MRN: [id number]  Admission Date: 10/26/2023  Hospital Length of Stay: 4 days  Code Status: Full Code   Attending Physician: Dmitry Mayo MD   Primary Care Physician: Vasu Kong MD  Expected Discharge Date: 10/31/2023  Principal Problem:Acute on chronic combined systolic and diastolic CHF, NYHA class 4    Subjective:     Hospital Course:   He has had good UOP on Lasix gtt of about 1.5L/day, and within 2 days reported feeling at his baseline.  Wife is working with CM/SW on in-[employer]-network centers: [facility] in [place] appears to be the leading option.  GDMT was titrated back on and up: he tolerated valsartan 20 well so this was transitioned to Entresto 24-26.  His CVP trended down to 4 and .      Interval History: NAEON.  Continued to have good UOP overnight; CVP now 4 so moving from Lasix gtt to pushes of 40 BID.     Objective:      Vital Signs (Most Recent):  Temp: 98.2 °F (36.8 °C) (10/29/23 0736)  Pulse: 74 (10/29/23 1000)  Resp: 18 (10/29/23 0736)  BP: (!) 100/57 (10/29/23 0736)  SpO2: 100 % (10/29/23 0736) Vital Signs (24h Range):  Temp:  [97 °F (36.1 °C)-98.7 °F (37.1 °C)] 98.2 °F (36.8 °C)  Pulse:  [66-83] 74  Resp:  [16-18] 18  SpO2:  [96 %-100 %] 100 %  BP: ()/(53-61) 100/57      Weight: 76.4 kg (168 lb 6.9 oz)  Body mass index is 22.84 kg/m².     SpO2: 100 %        Intake/Output Summary (Last 24 hours) at 10/29/2023 1157  Last data filed at 10/29/2023 0600      Gross per 24 hour   Intake 420 ml   Output 1900 ml   Net -1480 ml         Lines/Drains/Airways         Peripherally Inserted Central Catheter Line  Duration                PICC Double Lumen 10/13/23 1542 right basilic 15 days                          Physical Exam  Constitutional:       Appearance: Normal appearance. He is normal weight. He is not ill-appearing.      Comments: Sitting on couch   HENT:       Head: Atraumatic.   Neck:      Comments: No elevated JVD  Cardiovascular:      Rate and Rhythm: Normal rate.      Heart sounds: No murmur heard.  Pulmonary:      Effort: Pulmonary effort is normal.      Breath sounds: Normal breath sounds.   Abdominal:      General: There is distension.      Palpations: Abdomen is soft.   Musculoskeletal:      Right lower leg: Edema present.      Left lower leg: Edema present.   Skin:     General: Skin is warm.   Neurological:      Mental Status: He is alert.            Significant Labs: CMP         Recent Labs   Lab 10/27/23  1906 10/28/23  0938 10/29/23  0542    138 137   K 4.0 3.5 4.1    103 104   CO2 19* 21* 19*   * 295* 145*   BUN 39* 33* 36*   CREATININE 2.1* 2.3* 2.0*   CALCIUM 8.6* 9.0 8.8   PROT  --  6.9 6.9   ALBUMIN  --  3.4* 3.4*   BILITOT  --  0.7 0.6   ALKPHOS  --  72 73   AST  --  13 13   ALT  --  15 14   ANIONGAP 13 14 14   , CBC        Recent Labs   Lab 10/28/23  0938 10/29/23  0542   WBC 3.51* 4.36   HGB 10.1* 10.8*   HCT 29.8* 32.0*    290   , and All pertinent lab results from the last 24 hours have been reviewed.     Significant Imaging:  Reviewed      Assessment and Plan:       * Acute on chronic combined systolic and diastolic CHF, NYHA class 4  2/2 iCM  Last TTE on 10/26 with: EF 15-20%, LVIDd 7.1 cm. Normal RV size with normal RVSF. mild AS, mild to mod MR. CVP 8 mmHg  Last  bl ~400  CXR with vascular congestion  On home  5  Currently not a candidate for advanced options at [facility] due to insurance ([employer]; [facility] is not in their network)    · GDMT as tolerated: Continue Entresto  · Downgrading Lasix 40 IV BID to 40 PO BID  · Rechecking BNP tomorrow AM  · Exploring stronger home diuresis regimen  · Strict I&O  · Continue  gtt at 5/hr  · Replete electrolytes as needed  · Family will need to contact one of the in-network centers to initiate transplant workup; [facility] in [place]  are possibilities that CM/SW will explore on Monday        Paroxysmal atrial fibrillation  Will cont amiodarone (currently on VT dose)  Cont AC with Eliquis    Type 2 diabetes mellitus with diabetic polyneuropathy, without long-term current use of insulin  Last hgb a1c 7.6.  No home meds.  BG since admission rising to 295 as of 10/28.  Starting LDSSI, will titrate up to goal 140-180.    HFrEF (heart failure with reduced ejection fraction)        Ventricular tachycardia  H/o VT  On amiodarone 400 qd- Will cont  Tele monitoring        VTE Risk Mitigation (From admission, onward)         Ordered     apixaban tablet 5 mg  2 times daily         10/26/23 1857     IP VTE HIGH RISK PATIENT  Once         10/26/23 1852     Place sequential compression device  Until discontinued         10/26/23 1852                Nic Mares MD  Cardiology  [facility] - Cardiology Stepdown

## 2023-10-30 NOTE — PLAN OF CARE
Problem: Violence Risk or Actual  Goal: Anger and Impulse Control  Outcome: Ongoing, Progressing  Intervention: Minimize Safety Risk  Flowsheets (Taken 10/30/2023 1718)  Behavior Management: boundaries reinforced  Sensory Stimulation Regulation: care clustered  Enhanced Safety Measures:   family to remain at bedside   room near unit station  Intervention: Promote Self-Control  Flowsheets (Taken 10/30/2023 1718)  Supportive Measures:   active listening utilized   counseling provided   self-care encouraged  Environmental Support: calm environment promoted     Problem: Diabetes Comorbidity  Goal: Blood Glucose Level Within Targeted Range  Outcome: Ongoing, Progressing  Intervention: Monitor and Manage Glycemia  Flowsheets (Taken 10/30/2023 1718)  Glycemic Management: blood glucose monitored     Problem: Oral Intake Inadequate (Acute Kidney Injury/Impairment)  Goal: Optimal Nutrition Intake  Outcome: Ongoing, Progressing  Intervention: Promote and Optimize Nutrition  Flowsheets (Taken 10/30/2023 1718)  Oral Nutrition Promotion:   rest periods promoted   social interaction promoted     Problem: Infection  Goal: Absence of Infection Signs and Symptoms  Outcome: Ongoing, Progressing  Intervention: Prevent or Manage Infection  Flowsheets (Taken 10/30/2023 1718)  Fever Reduction/Comfort Measures:   lightweight bedding   lightweight clothing  Isolation Precautions: precautions maintained

## 2023-10-30 NOTE — PLAN OF CARE
Problem: Violence Risk or Actual  Goal: Anger and Impulse Control  Outcome: Ongoing, Progressing     Problem: Diabetes Comorbidity  Goal: Blood Glucose Level Within Targeted Range  Outcome: Ongoing, Progressing     Problem: Fluid and Electrolyte Imbalance (Acute Kidney Injury/Impairment)  Goal: Fluid and Electrolyte Balance  Outcome: Ongoing, Progressing     Problem: Oral Intake Inadequate (Acute Kidney Injury/Impairment)  Goal: Optimal Nutrition Intake  Outcome: Ongoing, Progressing     Problem: Infection  Goal: Absence of Infection Signs and Symptoms  Outcome: Ongoing, Progressing     Problem: Fall Injury Risk  Goal: Absence of Fall and Fall-Related Injury  Outcome: Ongoing, Progressing

## 2023-10-31 LAB
ALBUMIN SERPL BCP-MCNC: 3.2 G/DL (ref 3.5–5.2)
ALP SERPL-CCNC: 67 U/L (ref 55–135)
ALT SERPL W/O P-5'-P-CCNC: 12 U/L (ref 10–44)
ANION GAP SERPL CALC-SCNC: 12 MMOL/L (ref 8–16)
AST SERPL-CCNC: 13 U/L (ref 10–40)
BASOPHILS # BLD AUTO: 0.04 K/UL (ref 0–0.2)
BASOPHILS NFR BLD: 1 % (ref 0–1.9)
BILIRUB SERPL-MCNC: 0.6 MG/DL (ref 0.1–1)
BUN SERPL-MCNC: 42 MG/DL (ref 8–23)
CALCIUM SERPL-MCNC: 8.7 MG/DL (ref 8.7–10.5)
CHLORIDE SERPL-SCNC: 104 MMOL/L (ref 95–110)
CO2 SERPL-SCNC: 20 MMOL/L (ref 23–29)
CREAT SERPL-MCNC: 1.9 MG/DL (ref 0.5–1.4)
DIFFERENTIAL METHOD: ABNORMAL
EOSINOPHIL # BLD AUTO: 0.1 K/UL (ref 0–0.5)
EOSINOPHIL NFR BLD: 3.3 % (ref 0–8)
ERYTHROCYTE [DISTWIDTH] IN BLOOD BY AUTOMATED COUNT: 13.8 % (ref 11.5–14.5)
EST. GFR  (NO RACE VARIABLE): 39.6 ML/MIN/1.73 M^2
GLUCOSE SERPL-MCNC: 140 MG/DL (ref 70–110)
HCT VFR BLD AUTO: 29.7 % (ref 40–54)
HGB BLD-MCNC: 10 G/DL (ref 14–18)
IMM GRANULOCYTES # BLD AUTO: 0.02 K/UL (ref 0–0.04)
IMM GRANULOCYTES NFR BLD AUTO: 0.5 % (ref 0–0.5)
LYMPHOCYTES # BLD AUTO: 1.3 K/UL (ref 1–4.8)
LYMPHOCYTES NFR BLD: 32.7 % (ref 18–48)
MAGNESIUM SERPL-MCNC: 2.3 MG/DL (ref 1.6–2.6)
MCH RBC QN AUTO: 29.4 PG (ref 27–31)
MCHC RBC AUTO-ENTMCNC: 33.7 G/DL (ref 32–36)
MCV RBC AUTO: 87 FL (ref 82–98)
MONOCYTES # BLD AUTO: 0.4 K/UL (ref 0.3–1)
MONOCYTES NFR BLD: 10.3 % (ref 4–15)
NEUTROPHILS # BLD AUTO: 2.1 K/UL (ref 1.8–7.7)
NEUTROPHILS NFR BLD: 52.2 % (ref 38–73)
NRBC BLD-RTO: 0 /100 WBC
PHOSPHATE SERPL-MCNC: 3.2 MG/DL (ref 2.7–4.5)
PLATELET # BLD AUTO: 247 K/UL (ref 150–450)
PMV BLD AUTO: 9.8 FL (ref 9.2–12.9)
POCT GLUCOSE: 173 MG/DL (ref 70–110)
POCT GLUCOSE: 234 MG/DL (ref 70–110)
POCT GLUCOSE: 273 MG/DL (ref 70–110)
POCT GLUCOSE: 295 MG/DL (ref 70–110)
POTASSIUM SERPL-SCNC: 4.1 MMOL/L (ref 3.5–5.1)
PROT SERPL-MCNC: 6.3 G/DL (ref 6–8.4)
RBC # BLD AUTO: 3.4 M/UL (ref 4.6–6.2)
SODIUM SERPL-SCNC: 136 MMOL/L (ref 136–145)
WBC # BLD AUTO: 3.97 K/UL (ref 3.9–12.7)

## 2023-10-31 PROCEDURE — 25000003 PHARM REV CODE 250: Performed by: STUDENT IN AN ORGANIZED HEALTH CARE EDUCATION/TRAINING PROGRAM

## 2023-10-31 PROCEDURE — 84100 ASSAY OF PHOSPHORUS: CPT

## 2023-10-31 PROCEDURE — 85025 COMPLETE CBC W/AUTO DIFF WBC: CPT

## 2023-10-31 PROCEDURE — 25000003 PHARM REV CODE 250: Performed by: INTERNAL MEDICINE

## 2023-10-31 PROCEDURE — 25000003 PHARM REV CODE 250

## 2023-10-31 PROCEDURE — 20600001 HC STEP DOWN PRIVATE ROOM

## 2023-10-31 PROCEDURE — 83735 ASSAY OF MAGNESIUM: CPT

## 2023-10-31 PROCEDURE — 80053 COMPREHEN METABOLIC PANEL: CPT

## 2023-10-31 RX ORDER — FUROSEMIDE 40 MG/1
40 TABLET ORAL 2 TIMES DAILY
Qty: 60 TABLET | Refills: 11 | Status: ON HOLD | OUTPATIENT
Start: 2023-10-31 | End: 2024-01-24 | Stop reason: HOSPADM

## 2023-10-31 RX ADMIN — PANTOPRAZOLE SODIUM 40 MG: 40 TABLET, DELAYED RELEASE ORAL at 05:10

## 2023-10-31 RX ADMIN — APIXABAN 5 MG: 5 TABLET, FILM COATED ORAL at 09:10

## 2023-10-31 RX ADMIN — SACUBITRIL AND VALSARTAN 1 TABLET: 24; 26 TABLET, FILM COATED ORAL at 09:10

## 2023-10-31 RX ADMIN — AMIODARONE HYDROCHLORIDE 400 MG: 200 TABLET ORAL at 09:10

## 2023-10-31 RX ADMIN — ATORVASTATIN CALCIUM 40 MG: 40 TABLET, FILM COATED ORAL at 08:10

## 2023-10-31 RX ADMIN — APIXABAN 5 MG: 5 TABLET, FILM COATED ORAL at 08:10

## 2023-10-31 RX ADMIN — FUROSEMIDE 40 MG: 40 TABLET ORAL at 09:10

## 2023-10-31 RX ADMIN — FUROSEMIDE 40 MG: 40 TABLET ORAL at 05:10

## 2023-10-31 RX ADMIN — ASPIRIN 81 MG: 81 TABLET, COATED ORAL at 09:10

## 2023-10-31 RX ADMIN — ACETAMINOPHEN 650 MG: 325 TABLET ORAL at 08:10

## 2023-10-31 RX ADMIN — SACUBITRIL AND VALSARTAN 1 TABLET: 24; 26 TABLET, FILM COATED ORAL at 08:10

## 2023-10-31 RX ADMIN — GABAPENTIN 300 MG: 300 CAPSULE ORAL at 08:10

## 2023-10-31 NOTE — PLAN OF CARE
[facility] - Cardiology Stepdown  Discharge Reassessment    Primary Care Provider: Vasu Kong MD    Expected Discharge Date: 11/1/2023    Reassessment (most recent)       Discharge Reassessment - 10/31/23 1413          Discharge Reassessment    Assessment Type Discharge Planning Reassessment     Discharge Plan discussed with: Spouse/sig other     Name(s) and Number(s) Arlyn Abbott (wife) [phone]     Communicated STANLEY with patient/caregiver Yes     Discharge Plan A Hospital Transfer     Discharge Plan B Home with family     DME Needed Upon Discharge  none     Transition of Care Barriers Underinsured     Why the patient remains in the hospital Requires continued medical care                 Per Dr Mayo they are still pursuing transfer to another hospital for transplant but so far have not been able to find an accepting facility.  Per Dr Mayo if unable to find accepting facility then pt will discharge home.  SW received call from pt's wife Arlyn inquiring about status of transfer and SW relayed the above, but Arlyn stated that she had been told by team that pt was already accepted by a hospital in [place].  SW informed team of this and advised them to update family.    Meanwhile, per discussion with [facility] via speakerphone pt's insurance is a barrier to finding an accepting provider.  Treatment team stated that they would update pt and family at bedside.      Discharge Plan A and Plan B have been determined by review of patient's clinical status, future medical and therapeutic needs, and coverage/benefits for post-acute care in coordination with multidisciplinary team members.  SW will continue to follow.      Shilpa Zamora, MAGDI  [facility]  [id number]

## 2023-10-31 NOTE — ASSESSMENT & PLAN NOTE
2/2 iCM  Last TTE on 10/26 with: EF 15-20%, LVIDd 7.1 cm. Normal RV size with normal RVSF. mild AS, mild to mod MR. CVP 8 mmHg  Last  bl ~400  CXR with vascular congestion  On home  5  Currently not a candidate for advanced options at [facility] due to insurance ([employer]; [facility] is not in their network)    GDMT as tolerated: Continue Entresto  Downgrading Lasix 40 IV BID to 40 PO BID  Rechecking BNP tomorrow AM  Exploring stronger home diuresis regimen  Strict I&O  Continue  gtt at 5/hr  Replete electrolytes as needed  Working with CM/SW, our transfer center, and transplant coordinator to find an accepting hospital for transplant

## 2023-10-31 NOTE — PROGRESS NOTES
[facility] - Cardiology Stepdown  Cardiology  Progress Note    Patient Name: Radha Abbott  MRN: [id number]  Admission Date: 10/26/2023  Hospital Length of Stay: 5 days  Code Status: Full Code   Attending Physician: Dmitry Mayo MD   Primary Care Physician: Vasu Kong MD  Expected Discharge Date: 11/1/2023  Principal Problem:Acute on chronic combined systolic and diastolic CHF, NYHA class 4    Subjective:     Hospital Course:   He has had good UOP on Lasix gtt of about 1.5L/day, and within 2 days reported feeling at his baseline.  Wife is working with CM/SW on in[facility] [place]network centers: [facility] in [place] appears to be the leading option.  GDMT was titrated back on and up: he tolerated valsartan 20 well so this was transitioned to Entresto 24-26.  His CVP trended down to 4 and .    Interval History: NAEON.  Stable objectively.  Subjectively continues to improve, Crt < 2 today.  Seeking referral to [facility] [place] in-network centers; coordinating with Transfer Center.    Objective:     Vital Signs (Most Recent):  Temp: 98.2 °F (36.8 °C) (10/31/23 1106)  Pulse: 79 (10/31/23 1106)  Resp: 20 (10/31/23 1106)  BP: (!) 94/55 (10/31/23 1106)  SpO2: 99 % (10/31/23 1106) Vital Signs (24h Range):  Temp:  [97.5 °F (36.4 °C)-98.6 °F (37 °C)] 98.2 °F (36.8 °C)  Pulse:  [75-87] 79  Resp:  [18-20] 20  SpO2:  [97 %-100 %] 99 %  BP: ()/(52-65) 94/55     Weight: 77.7 kg (171 lb 4.8 oz)  Body mass index is 23.23 kg/m².     SpO2: 99 %         Intake/Output Summary (Last 24 hours) at 10/31/2023 1227  Last data filed at 10/31/2023 0800  Gross per 24 hour   Intake 922 ml   Output 775 ml   Net 147 ml       Lines/Drains/Airways       Peripherally Inserted Central Catheter Line  Duration             PICC Double Lumen 10/13/23 1542 right basilic 17 days                       Physical Exam  Constitutional:       Appearance: Normal appearance. He is normal weight. He is not ill-appearing.      Comments:  Sitting on couch   HENT:      Head: Atraumatic.   Neck:      Comments: No elevated JVD  Cardiovascular:      Rate and Rhythm: Normal rate.      Heart sounds: No murmur heard.  Pulmonary:      Effort: Pulmonary effort is normal.      Breath sounds: Normal breath sounds.   Abdominal:      General: There is no distension.      Palpations: Abdomen is soft.   Musculoskeletal:      Right lower leg: No edema.      Left lower leg: No edema.   Skin:     General: Skin is warm.   Neurological:      Mental Status: He is alert and oriented to person, place, and time.   Psychiatric:         Mood and Affect: Mood normal.         Behavior: Behavior normal.          Significant Labs: CMP   Recent Labs   Lab 10/30/23  0514 10/31/23  0604   * 136   K 4.2 4.1    104   CO2 17* 20*   * 140*   BUN 42* 42*   CREATININE 2.0* 1.9*   CALCIUM 8.7 8.7   PROT 6.7 6.3   ALBUMIN 3.3* 3.2*   BILITOT 0.5 0.6   ALKPHOS 70 67   AST 13 13   ALT 13 12   ANIONGAP 13 12   , CBC   Recent Labs   Lab 10/30/23  0514 10/31/23  0604   WBC 4.16 3.97   HGB 10.8* 10.0*   HCT 31.6* 29.7*    247   , and All pertinent lab results from the last 24 hours have been reviewed.    Significant Imaging:  reviewed  Assessment and Plan:     * Acute on chronic combined systolic and diastolic CHF, NYHA class 4  2/2 iCM  Last TTE on 10/26 with: EF 15-20%, LVIDd 7.1 cm. Normal RV size with normal RVSF. mild AS, mild to mod MR. CVP 8 mmHg  Last  bl ~400  CXR with vascular congestion  On home  5  Currently not a candidate for advanced options at [facility] [place] due to insurance ([employer]; [facility] is not in their network)    GDMT as tolerated: Continue Entresto  Downgrading Lasix 40 IV BID to 40 PO BID  Rechecking BNP tomorrow AM  Exploring stronger home diuresis regimen  Strict I&O  Continue  gtt at 5/hr  Replete electrolytes as needed  Working with CM/SW, our transfer center, and transplant coordinator to find an accepting hospital for  transplant        Paroxysmal atrial fibrillation  Will cont amiodarone (currently on VT dose)  Cont AC with Eliquis    Type 2 diabetes mellitus with diabetic polyneuropathy, without long-term current use of insulin  Last hgb a1c 7.6.  No home meds.  BG since admission rising to 295 as of 10/28.  Starting LDSSI, will titrate up to goal 140-180.    HFrEF (heart failure with reduced ejection fraction)        Ventricular tachycardia  H/o VT  On amiodarone 400 qd- Will cont  Tele monitoring        VTE Risk Mitigation (From admission, onward)           Ordered     apixaban tablet 5 mg  2 times daily         10/26/23 1857     IP VTE HIGH RISK PATIENT  Once         10/26/23 1852     Place sequential compression device  Until discontinued         10/26/23 1852                    Nic Mares MD  Cardiology  [facility] - Cardiology Stepdown

## 2023-10-31 NOTE — SUBJECTIVE & OBJECTIVE
Interval History: NAEON.  Stable objectively.  Subjectively continues to improve, Crt < 2 today.  Seeking referral to [employer] in-network centers; coordinating with Transfer Center.    Objective:     Vital Signs (Most Recent):  Temp: 98.2 °F (36.8 °C) (10/31/23 1106)  Pulse: 79 (10/31/23 1106)  Resp: 20 (10/31/23 1106)  BP: (!) 94/55 (10/31/23 1106)  SpO2: 99 % (10/31/23 1106) Vital Signs (24h Range):  Temp:  [97.5 °F (36.4 °C)-98.6 °F (37 °C)] 98.2 °F (36.8 °C)  Pulse:  [75-87] 79  Resp:  [18-20] 20  SpO2:  [97 %-100 %] 99 %  BP: ()/(52-65) 94/55     Weight: 77.7 kg (171 lb 4.8 oz)  Body mass index is 23.23 kg/m².     SpO2: 99 %         Intake/Output Summary (Last 24 hours) at 10/31/2023 1227  Last data filed at 10/31/2023 0800  Gross per 24 hour   Intake 922 ml   Output 775 ml   Net 147 ml       Lines/Drains/Airways       Peripherally Inserted Central Catheter Line  Duration             PICC Double Lumen 10/13/23 1542 right basilic 17 days                       Physical Exam  Constitutional:       Appearance: Normal appearance. He is normal weight. He is not ill-appearing.      Comments: Sitting on couch   HENT:      Head: Atraumatic.   Neck:      Comments: No elevated JVD  Cardiovascular:      Rate and Rhythm: Normal rate.      Heart sounds: No murmur heard.  Pulmonary:      Effort: Pulmonary effort is normal.      Breath sounds: Normal breath sounds.   Abdominal:      General: There is no distension.      Palpations: Abdomen is soft.   Musculoskeletal:      Right lower leg: No edema.      Left lower leg: No edema.   Skin:     General: Skin is warm.   Neurological:      Mental Status: He is alert and oriented to person, place, and time.   Psychiatric:         Mood and Affect: Mood normal.         Behavior: Behavior normal.          Significant Labs: CMP   Recent Labs   Lab 10/30/23  0514 10/31/23  0604   * 136   K 4.2 4.1    104   CO2 17* 20*   * 140*   BUN 42* 42*   CREATININE 2.0* 1.9*    CALCIUM 8.7 8.7   PROT 6.7 6.3   ALBUMIN 3.3* 3.2*   BILITOT 0.5 0.6   ALKPHOS 70 67   AST 13 13   ALT 13 12   ANIONGAP 13 12   , CBC   Recent Labs   Lab 10/30/23  0514 10/31/23  0604   WBC 4.16 3.97   HGB 10.8* 10.0*   HCT 31.6* 29.7*    247   , and All pertinent lab results from the last 24 hours have been reviewed.    Significant Imaging:  reviewed

## 2023-10-31 NOTE — PLAN OF CARE
Problem: Diabetes Comorbidity  Goal: Blood Glucose Level Within Targeted Range  Outcome: Ongoing, Progressing     Problem: Fluid and Electrolyte Imbalance (Acute Kidney Injury/Impairment)  Goal: Fluid and Electrolyte Balance  Outcome: Ongoing, Progressing     Problem: Oral Intake Inadequate (Acute Kidney Injury/Impairment)  Goal: Optimal Nutrition Intake  Outcome: Ongoing, Progressing     Problem: Renal Function Impairment (Acute Kidney Injury/Impairment)  Goal: Effective Renal Function  Outcome: Ongoing, Progressing     Problem: Infection  Goal: Absence of Infection Signs and Symptoms  Outcome: Ongoing, Progressing     Problem: Fluid Imbalance (Heart Failure)  Goal: Fluid Balance  Outcome: Ongoing, Progressing     Problem: Sleep Disordered Breathing (Heart Failure)  Goal: Effective Breathing Pattern During Sleep  Outcome: Ongoing, Progressing

## 2023-11-01 VITALS
RESPIRATION RATE: 18 BRPM | HEIGHT: 72 IN | TEMPERATURE: 98 F | WEIGHT: 173.31 LBS | SYSTOLIC BLOOD PRESSURE: 91 MMHG | OXYGEN SATURATION: 100 % | DIASTOLIC BLOOD PRESSURE: 57 MMHG | HEART RATE: 87 BPM | BODY MASS INDEX: 23.47 KG/M2

## 2023-11-01 LAB
ALBUMIN SERPL BCP-MCNC: 3.1 G/DL (ref 3.5–5.2)
ALP SERPL-CCNC: 62 U/L (ref 55–135)
ALT SERPL W/O P-5'-P-CCNC: 13 U/L (ref 10–44)
ANION GAP SERPL CALC-SCNC: 11 MMOL/L (ref 8–16)
AST SERPL-CCNC: 12 U/L (ref 10–40)
BASOPHILS # BLD AUTO: 0.05 K/UL (ref 0–0.2)
BASOPHILS NFR BLD: 1.3 % (ref 0–1.9)
BILIRUB SERPL-MCNC: 0.6 MG/DL (ref 0.1–1)
BUN SERPL-MCNC: 44 MG/DL (ref 8–23)
CALCIUM SERPL-MCNC: 8.3 MG/DL (ref 8.7–10.5)
CHLORIDE SERPL-SCNC: 104 MMOL/L (ref 95–110)
CO2 SERPL-SCNC: 19 MMOL/L (ref 23–29)
CREAT SERPL-MCNC: 2 MG/DL (ref 0.5–1.4)
DIFFERENTIAL METHOD: ABNORMAL
EOSINOPHIL # BLD AUTO: 0.3 K/UL (ref 0–0.5)
EOSINOPHIL NFR BLD: 6.7 % (ref 0–8)
ERYTHROCYTE [DISTWIDTH] IN BLOOD BY AUTOMATED COUNT: 13.7 % (ref 11.5–14.5)
EST. GFR  (NO RACE VARIABLE): 37.3 ML/MIN/1.73 M^2
GLUCOSE SERPL-MCNC: 149 MG/DL (ref 70–110)
HCT VFR BLD AUTO: 29.1 % (ref 40–54)
HGB BLD-MCNC: 9.6 G/DL (ref 14–18)
IMM GRANULOCYTES # BLD AUTO: 0.01 K/UL (ref 0–0.04)
IMM GRANULOCYTES NFR BLD AUTO: 0.3 % (ref 0–0.5)
LYMPHOCYTES # BLD AUTO: 1.1 K/UL (ref 1–4.8)
LYMPHOCYTES NFR BLD: 28.4 % (ref 18–48)
MAGNESIUM SERPL-MCNC: 2.2 MG/DL (ref 1.6–2.6)
MCH RBC QN AUTO: 29.7 PG (ref 27–31)
MCHC RBC AUTO-ENTMCNC: 33 G/DL (ref 32–36)
MCV RBC AUTO: 90 FL (ref 82–98)
MONOCYTES # BLD AUTO: 0.3 K/UL (ref 0.3–1)
MONOCYTES NFR BLD: 8 % (ref 4–15)
NEUTROPHILS # BLD AUTO: 2.1 K/UL (ref 1.8–7.7)
NEUTROPHILS NFR BLD: 55.3 % (ref 38–73)
NRBC BLD-RTO: 0 /100 WBC
PHOSPHATE SERPL-MCNC: 3 MG/DL (ref 2.7–4.5)
PLATELET # BLD AUTO: 249 K/UL (ref 150–450)
PMV BLD AUTO: 10.7 FL (ref 9.2–12.9)
POCT GLUCOSE: 152 MG/DL (ref 70–110)
POCT GLUCOSE: 307 MG/DL (ref 70–110)
POCT GLUCOSE: 315 MG/DL (ref 70–110)
POTASSIUM SERPL-SCNC: 4.2 MMOL/L (ref 3.5–5.1)
PROT SERPL-MCNC: 6.2 G/DL (ref 6–8.4)
RBC # BLD AUTO: 3.23 M/UL (ref 4.6–6.2)
SODIUM SERPL-SCNC: 134 MMOL/L (ref 136–145)
WBC # BLD AUTO: 3.87 K/UL (ref 3.9–12.7)

## 2023-11-01 PROCEDURE — 80053 COMPREHEN METABOLIC PANEL: CPT

## 2023-11-01 PROCEDURE — 84100 ASSAY OF PHOSPHORUS: CPT

## 2023-11-01 PROCEDURE — 25000003 PHARM REV CODE 250: Performed by: STUDENT IN AN ORGANIZED HEALTH CARE EDUCATION/TRAINING PROGRAM

## 2023-11-01 PROCEDURE — 83735 ASSAY OF MAGNESIUM: CPT

## 2023-11-01 PROCEDURE — 99239 PR HOSPITAL DISCHARGE DAY,>30 MIN: ICD-10-PCS | Mod: ,,, | Performed by: INTERNAL MEDICINE

## 2023-11-01 PROCEDURE — 99239 HOSP IP/OBS DSCHRG MGMT >30: CPT | Mod: ,,, | Performed by: INTERNAL MEDICINE

## 2023-11-01 PROCEDURE — 85025 COMPLETE CBC W/AUTO DIFF WBC: CPT

## 2023-11-01 PROCEDURE — 25000003 PHARM REV CODE 250

## 2023-11-01 PROCEDURE — 63600175 PHARM REV CODE 636 W HCPCS: Performed by: STUDENT IN AN ORGANIZED HEALTH CARE EDUCATION/TRAINING PROGRAM

## 2023-11-01 RX ORDER — PANTOPRAZOLE SODIUM 40 MG/1
40 TABLET, DELAYED RELEASE ORAL
Qty: 30 TABLET | Refills: 5 | Status: ON HOLD | OUTPATIENT
Start: 2023-11-02 | End: 2024-01-24 | Stop reason: HOSPADM

## 2023-11-01 RX ADMIN — DOBUTAMINE HYDROCHLORIDE 5 MCG/KG/MIN: 400 INJECTION INTRAVENOUS at 06:11

## 2023-11-01 RX ADMIN — ASPIRIN 81 MG: 81 TABLET, COATED ORAL at 08:11

## 2023-11-01 RX ADMIN — AMIODARONE HYDROCHLORIDE 400 MG: 200 TABLET ORAL at 08:11

## 2023-11-01 RX ADMIN — FUROSEMIDE 40 MG: 40 TABLET ORAL at 08:11

## 2023-11-01 RX ADMIN — PANTOPRAZOLE SODIUM 40 MG: 40 TABLET, DELAYED RELEASE ORAL at 06:11

## 2023-11-01 RX ADMIN — APIXABAN 5 MG: 5 TABLET, FILM COATED ORAL at 08:11

## 2023-11-01 RX ADMIN — INSULIN ASPART 4 UNITS: 100 INJECTION, SOLUTION INTRAVENOUS; SUBCUTANEOUS at 12:11

## 2023-11-01 RX ADMIN — SACUBITRIL AND VALSARTAN 1 TABLET: 24; 26 TABLET, FILM COATED ORAL at 08:11

## 2023-11-01 NOTE — DISCHARGE INSTRUCTIONS
Thank you for allowing us the honor and privilege of caring for you.  We are doubling your home Lasix dose from 20 mg twice a day to 40 mg twice a day, with the hope that this increased dose will keep you out of the hospital longer.      You will be contacted by our Heart Failure clinic to schedule follow up, to discuss getting you to a center that's covered by your insurance.    We wish you nothing but the best and are praying for you!  -Dr Mares

## 2023-11-01 NOTE — PLAN OF CARE
[facility] - Cardiology Stepdown  Discharge Final Note    Primary Care Provider: Vasu Kong MD    Expected Discharge Date: 11/1/2023    Final Discharge Note (most recent)       Final Note - 11/01/23 1300          Final Note    Assessment Type Final Discharge Note     Anticipated Discharge Disposition Home or Self Care                   Per treatment team pt being discharged home today and the only follow up appt needed is with [facility], who will schedule their own appt.      Shilpa Zamora LMSW  [facility]  [id number]

## 2023-11-01 NOTE — PLAN OF CARE
Problem: Fluid and Electrolyte Imbalance (Acute Kidney Injury/Impairment)  Goal: Fluid and Electrolyte Balance  Outcome: Ongoing, Progressing     Problem: Oral Intake Inadequate (Acute Kidney Injury/Impairment)  Goal: Optimal Nutrition Intake  Outcome: Ongoing, Progressing     Problem: Renal Function Impairment (Acute Kidney Injury/Impairment)  Goal: Effective Renal Function  Outcome: Ongoing, Progressing     Problem: Infection  Goal: Absence of Infection Signs and Symptoms  Outcome: Ongoing, Progressing     Problem: Fall Injury Risk  Goal: Absence of Fall and Fall-Related Injury  Outcome: Ongoing, Progressing     Problem: Adjustment to Illness (Heart Failure)  Goal: Optimal Coping  Outcome: Ongoing, Progressing     Problem: Fluid Imbalance (Heart Failure)  Goal: Fluid Balance  Outcome: Ongoing, Progressing

## 2023-11-01 NOTE — DISCHARGE SUMMARY
[facility] - Cardiology Stepdown  Cardiology  Discharge Summary      Patient Name: Radha Abbott  MRN: [id number]  Admission Date: 10/26/2023  Hospital Length of Stay: 6 days  Discharge Date and Time:  11/01/2023 2:13 PM  Attending Physician: Dmitry Mayo MD    Discharging Provider: Nic Mares MD  Primary Care Physician: Vasu Kong MD    HPI:   63 year old male with hx of CAD s/p 3v CABG (unclear anatomy, 2009), ICM with a recent EF of 15-20% s/p ICD (medtronik 2009), DM2 (a1c 7.7), HTN, HLD, Vfib on amio who presented as a direct admission from HTS/PH clinic for worsening shortness of breath.      The patient was recently admitted to the [facility] service on 10/12 as a transfer from [facility] for IVÁN and CHF. During this time he was placed on . He got RHC on 10/13 which showed RA: 2 RV: 43/2 PA: 35/16, mean 23 PCWP: 12. Kidney function slowly recovering with holding of diuretics.  His GDMT (entresto, aldactone, jardiance) and diuretics were held at the time. Patient was found to not be a candidate for advanced therapies (due to insurance) here at [facility] and was discharged home on  5 with plan to follow up in clinic in order to help find an alternative center for OHTx/VAD. He had a subsequent admission on 10/19 with worsening dyspnea, orthopnea and PND. Was observed overnight and diuresed prior to discharge the following day. Patient presented today in clinic, and was noted to be short of breath to a point he was unable to complete his sentences in clinic.     * No surgery found *     Indwelling Lines/Drains at time of discharge:  Lines/Drains/Airways       Peripherally Inserted Central Catheter Line  Duration             PICC Double Lumen 10/13/23 1542 right basilic 18 days                    Hospital Course:  He has had good UOP on Lasix gtt of about 1.5L/day, and within 2 days reported feeling at his baseline.  Wife is working with CM/SW on in-[employer]-network centers: [facility] in [place] appeared to be the leading option, but they denied him.  GDMT was titrated back on and up: he tolerated valsartan 20 well so this was transitioned to Entresto 24-26.  His CVP trended down to 4 and .  Our transplant coordinator joined the search for an accepting center; there was confusion about [employer] role in the process.  They are the transplant benefit manager for his [employer] plan.  We are continuing to seek auth for him.  In the meantime, since he is back to his baseline we are discharging him home in stable condition, with follow-up with HTS in clinic.    Goals of Care Treatment Preferences:  Code Status: Full Code    Physical Exam  Constitutional:       Appearance: The patient is well-developed.   HENT:      Head: Normocephalic and atraumatic.      Right Ear: External ear normal.      Left Ear: External ear normal.   Eyes:      Conjunctiva/sclera: Conjunctivae normal.   Cardiovascular:      Rate and Rhythm: Normal rate and regular rhythm.      Pulses: Intact distal pulses.           Radial pulses are 2+ on the right side and 2+ on the left side.   Pulmonary:      Effort: Pulmonary effort is normal. No respiratory distress.      Breath sounds: Normal.   Abdominal:      General: Bowel sounds are normal. There is no distension.      Palpations: Abdomen is soft.      Tenderness: There is no abdominal tenderness.   Musculoskeletal:         General: Normal range of motion.      Cervical back: Normal range of motion and neck supple.   Skin:     General: Skin is warm and dry.   Neurological:      Mental Status: The patient is alert and oriented to person, place, and time.     Vitals:    11/01/23 1128 11/01/23 1159 11/01/23 1200 11/01/23 1406   BP:  (!) 91/57     BP Location:  Left arm     Patient Position:  Lying     Pulse: 74 83 89 87   Resp:  18     Temp:  98.4 °F (36.9 °C)     TempSrc:  Oral     SpO2:  100%     Weight:       Height:              Significant  "Diagnostic Studies: Labs: CMP   Recent Labs   Lab 10/31/23  0604 11/01/23  0628    134*   K 4.1 4.2    104   CO2 20* 19*   * 149*   BUN 42* 44*   CREATININE 1.9* 2.0*   CALCIUM 8.7 8.3*   PROT 6.3 6.2   ALBUMIN 3.2* 3.1*   BILITOT 0.6 0.6   ALKPHOS 67 62   AST 13 12   ALT 12 13   ANIONGAP 12 11   , CBC   Recent Labs   Lab 10/31/23  0604 11/01/23  0628   WBC 3.97 3.87*   HGB 10.0* 9.6*   HCT 29.7* 29.1*    249   , INR   Lab Results   Component Value Date    INR 1.0 10/16/2023    INR 1.0 10/15/2023    INR 1.0 10/14/2023   , Lipid Panel   Lab Results   Component Value Date    CHOL 135 11/03/2021    HDL 28 (L) 11/03/2021    LDLCALC 81 11/03/2021    TRIG 131 11/03/2021    CHOLHDL 20.4 02/07/2018   , Troponin No results for input(s): "TROPONINI" in the last 168 hours., and A1C:   Recent Labs   Lab 10/12/23  2130   HGBA1C 7.6*     Radiology: X-Ray: CXR: X-Ray Chest 1 View (CXR):   Results for orders placed or performed during the hospital encounter of 10/26/23   X-Ray Chest 1 View    Narrative    EXAMINATION:  XR CHEST 1 VIEW    CLINICAL HISTORY:  sob;    TECHNIQUE:  Single frontal view of the chest was performed.    COMPARISON:  10/26/2023    FINDINGS:  Cardiac size is slightly enlarged and AICD unit is present.  Wire sutures seen in the sternum and vascular catheter remains in place.  Lungs are clear without infiltrate or cardiac failure.      Impression    See above      Electronically signed by: Vasu Hernandez MD  Date:    10/30/2023  Time:    11:19     Cardiac Graphics: Echocardiogram: Transthoracic echo (TTE) complete (Cupid Only):   Results for orders placed or performed during the hospital encounter of 10/19/23   Echo   Result Value Ref Range    Ascending aorta 3.27 cm    STJ 3.03 cm    AV mean gradient 2 mmHg    Ao peak gisselle 0.81 m/s    Ao VTI 11.08 cm    IVS 0.62 0.6 - 1.1 cm    LA size 4.63 cm    Left Atrium Major Axis 6.73 cm    Left Atrium Minor Axis 7.19 cm    LVIDd 7.1 (A) 3.5 - " 6.0 cm    LVIDs 6.20 (A) 2.1 - 4.0 cm    LVOT diameter 2.01 cm    LVOT peak VTI 10.20 cm    Posterior Wall 1.20 (A) 0.6 - 1.1 cm    MV Peak A Zeferino 0.35 m/s    E wave deceleration time 93.35 msec    MV Peak E Zeferino 1.17 m/s    RA Major Axis 5.17 cm    RA Width 3.59 cm    RVDD 3.30 cm    Sinus 3.20 cm    TAPSE 1.87 cm    LA WIDTH 5.68 cm    MV stenosis pressure 1/2 time 27.07 ms    LV Diastolic Volume 247.88 mL    LV Systolic Volume 193.95 mL    LVOT peak zeferino 0.74 m/s    TDI LATERAL 0.09 m/s    TDI SEPTAL 0.04 m/s    LV LATERAL E/E' RATIO 13.00 m/s    LV SEPTAL E/E' RATIO 29.25 m/s    FS 13 %    LA volume 155.41 cm3    LV mass 293.32 g    ZLVIDD 1.87     ZLVIDS 4.27     Left Ventricle Relative Wall Thickness 0.34 cm    AV valve area 2.92 cm²    AV Velocity Ratio 0.91     AV index (prosthetic) 0.92     MV valve area p 1/2 method 8.13 cm2    E/A ratio 3.34     Mean e' 0.07 m/s    LVOT area 3.2 cm2    LVOT stroke volume 32.35 cm3    AV peak gradient 3 mmHg    E/E' ratio 18.00 m/s    LV Systolic Volume Index 96.5 mL/m2    LV Diastolic Volume Index 123.32 mL/m2    LA Volume Index 77.3 mL/m2    LV Mass Index 146 g/m2    ALLAN by Velocity Ratio 2.90 cm²    BSA 2 m2    Est. RA pres 8 mmHg    Mondragon's Biplane MOD Ejection Fraction 18 %    Narrative      Left Ventricle: The left ventricle is severely dilated. Moderately   increased ventricular mass. Normal wall thickness. There is moderate   eccentric hypertrophy. regional wall motion abnormalities present. There   is severely reduced systolic function with a visually estimated ejection   fraction of 15 - 20%. Biplane (2D) method of discs ejection fraction is   18%. There is diastolic dysfunction.    Left Atrium: Left atrium is severely dilated.    Right Ventricle: Normal right ventricular cavity size. Systolic   function is normal.    Aortic Valve: There is mild aortic valve sclerosis.    Mitral Valve: There is mild to moderate regurgitation.    IVC/SVC: Intermediate venous  pressure at 8 mmHg.         Pending Diagnostic Studies:       None            Final Active Diagnoses:    Diagnosis Date Noted POA    PRINCIPAL PROBLEM:  Acute on chronic combined systolic and diastolic CHF, NYHA class 4 [I50.43] 10/17/2023 Yes    Paroxysmal atrial fibrillation [I48.0] 10/15/2023 Yes    Ventricular tachycardia [I47.20] 10/07/2023 Yes    Type 2 diabetes mellitus with diabetic polyneuropathy, without long-term current use of insulin [E11.42] 10/07/2023 Yes      Problems Resolved During this Admission:     Cardiac/Vascular  * Acute on chronic combined systolic and diastolic CHF, NYHA class 4  2/2 iCM  Last TTE on 10/26 with: EF 15-20%, LVIDd 7.1 cm. Normal RV size with normal RVSF. mild AS, mild to mod MR. CVP 8 mmHg  Last  bl ~400  CXR with vascular congestion  On home  5  Currently not a candidate for advanced options at [facility] due to insurance ([facility] [employer] as ; [facility] is not in their network)    GDMT as tolerated: Continue Entresto, other GDMT to be resumed at follow-up  Increasing home Lasix to 40 PO BID  Rechecking BNP tomorrow AM  Continue  gtt at 5/hr  Replete electrolytes as needed  HTS clinic for follow up        Paroxysmal atrial fibrillation  Will cont amiodarone (currently on VT dose)  Cont AC with Eliquis    Endocrine  Type 2 diabetes mellitus with diabetic polyneuropathy, without long-term current use of insulin  Last hgb a1c 7.6.  No home meds.  BG since admission rising to 295 as of 10/28.         Discharged Condition: stable    Disposition: Home or Self Care    Follow Up:   Follow-up Information       [facility] (Medical Ofc Bldg) .    Specialty: Transplant  Contact information:  [address] [place] [address] Dr Debbie [place] [address] [phone]  Additional information:  Please take [address] for the Medical Office Building. Check in on the 4th floor.                         Patient Instructions:   No discharge procedures on  file.  Medications:  Reconciled Home Medications:      Medication List        START taking these medications      pantoprazole 40 MG tablet  Commonly known as: PROTONIX  Take 1 tablet (40 mg total) by mouth before breakfast.  Start taking on: November 2, 2023     sacubitriL-valsartan 24-26 mg per tablet  Commonly known as: ENTRESTO  Take 1 tablet by mouth 2 (two) times daily.            CHANGE how you take these medications      furosemide 40 MG tablet  Commonly known as: LASIX  Take 1 tablet (40 mg total) by mouth 2 (two) times daily.  What changed:   medication strength  how much to take  additional instructions            CONTINUE taking these medications      amiodarone 400 MG tablet  Commonly known as: PACERONE  Take 1 tablet (400 mg total) by mouth once daily.     aspirin 81 MG EC tablet  Commonly known as: ECOTRIN  Take 81 mg by mouth once daily.     atorvastatin 40 MG tablet  Commonly known as: LIPITOR  Take 40 mg by mouth.     DOBUTamine 1,000 mg/250 mL (4,000 mcg/mL) infusion  Commonly known as: DOBUTREX  Inject 389.5 mcg/min into the vein continuous.     ELIQUIS 5 mg Tab  Generic drug: apixaban  Take 1 tablet (5 mg total) by mouth 2 (two) times daily.     fish oil-omega-3 fatty acids 300-1,000 mg capsule  Take 2 g by mouth once daily.     gabapentin 300 MG capsule  Commonly known as: NEURONTIN  Take 300 mg by mouth nightly as needed.     multivitamin capsule  Take 1 capsule by mouth once daily.     potassium chloride SA 20 MEQ tablet  Commonly known as: K-DUR,KLOR-CON  Take 1 tablet (20 mEq total) by mouth once daily.            STOP taking these medications      famotidine 20 MG tablet  Commonly known as: PEPCID              Time spent on the discharge of patient: 45 minutes    Nic Mares MD  Cardiology  [facility] - Cardiology Stepdown

## 2023-11-01 NOTE — NURSING
1420: Discharge insutructions reviewed with patient and patient's wife, verbalized understanding. Home dobutamine drip hooked up to existing picc line, all belongings with patient. Telemetry box removed

## 2023-11-01 NOTE — PLAN OF CARE
Problem: Adult Inpatient Plan of Care  Goal: Plan of Care Review  11/1/2023 0925 by Inna Johnston RN  Outcome: Ongoing, Progressing  11/1/2023 0925 by Inna Johnston RN  Outcome: Ongoing, Not Progressing  Goal: Patient-Specific Goal (Individualized)  11/1/2023 0925 by Inna Johnston RN  Outcome: Ongoing, Progressing  11/1/2023 0925 by Inna Johnston RN  Outcome: Ongoing, Not Progressing  Goal: Absence of Hospital-Acquired Illness or Injury  11/1/2023 0925 by Inna Johnston RN  Outcome: Ongoing, Progressing  11/1/2023 0925 by Inna Johnston RN  Outcome: Ongoing, Not Progressing  Goal: Optimal Comfort and Wellbeing  11/1/2023 0925 by Inna Johnston RN  Outcome: Ongoing, Progressing  11/1/2023 0925 by Inna Johnston RN  Outcome: Ongoing, Not Progressing  Goal: Readiness for Transition of Care  11/1/2023 0925 by Inna Johnsotn RN  Outcome: Ongoing, Progressing  11/1/2023 0925 by Inna Johnston RN  Outcome: Ongoing, Not Progressing

## 2023-11-01 NOTE — ASSESSMENT & PLAN NOTE
2/2 iCM  Last TTE on 10/26 with: EF 15-20%, LVIDd 7.1 cm. Normal RV size with normal RVSF. mild AS, mild to mod MR. CVP 8 mmHg  Last  bl ~400  CXR with vascular congestion  On home  5  Currently not a candidate for advanced options at [facility] due to insurance ([facility] with [employer] as ; [facility] is not in their network)    GDMT as tolerated: Continue Entresto, other GDMT to be resumed at follow-up  Increasing home Lasix to 40 PO BID  Rechecking BNP tomorrow AM  Continue  gtt at 5/hr  Replete electrolytes as needed  [facility] clinic for follow up

## 2023-11-02 ENCOUNTER — TELEPHONE (OUTPATIENT)
Dept: CARDIOLOGY | Facility: CLINIC | Age: 63
End: 2023-11-02
Payer: MEDICAID

## 2023-11-03 ENCOUNTER — TELEPHONE (OUTPATIENT)
Dept: ADMINISTRATIVE | Facility: CLINIC | Age: 63
End: 2023-11-03
Payer: MEDICAID

## 2023-11-03 ENCOUNTER — TELEPHONE (OUTPATIENT)
Dept: TRANSPLANT | Facility: CLINIC | Age: 63
End: 2023-11-03
Payer: MEDICAID

## 2023-11-03 ENCOUNTER — DOCUMENTATION ONLY (OUTPATIENT)
Dept: TRANSPLANT | Facility: CLINIC | Age: 63
End: 2023-11-03
Payer: MEDICAID

## 2023-11-03 ENCOUNTER — PATIENT OUTREACH (OUTPATIENT)
Dept: ADMINISTRATIVE | Facility: CLINIC | Age: 63
End: 2023-11-03
Payer: MEDICAID

## 2023-11-03 NOTE — TELEPHONE ENCOUNTER
11/3/2023    Received the below message via the patient portal:    Pic line issue  Received: Today  [facility] Dutta [facility] Heart Failure Clinical  Caller: Unspecified (Today,  9:26 AM)  Arlyn [phone] pt wife says she's having issues flushinf his pic line and would like a call from the nurse.     Thanks     ---------------------------------------------------------  Called and spoke with patient and spouse via phone.  Spouse reports patient was discharged home without a home care agency and is now having trouble getting the second lumen of the DL PICC to flush.  Spouse states that she has attempted to flush with both saline and heparin and was unable to flush the second lumen port with either solution.  Spouse confirms no pump alarms or trouble with dobutamine infusion starting and stopping at this time. Spouse confirms education was completed at time of discharge for proper technique for flushing the second port.     KEARA Waldrop MA reached out to GERMAN Barakat [facility] by phone.  GERMAN Barakat W worked quickly with inpatient discharging team to get orders placed and contacted the [facility] agency directly.  Resumed previous [facility] standing orders with [facility], weekly inotrope labs scheduled for Tuesdays; CBC BMP, BNP, and Mg.       Reviewed and discussed the above with CHANG Riley NP.  JODY Riley NP verified discharge orders were placed and correct.  Received RADHA Riley NP/XIAO FRAZIERN RN:  Attempt declotting of second lumen PICC utilizing infusion company ([email or web address]).      Called and spoke with patient and spouse via phone.  Confirmed [facility] agency reached out to patient to re-establish weekly lab draws and dressing changes.  Spouse states that friend of family, RN, came by the house to teach proper technique for flushing a PICC Line with return demonstration.  Spouse states that the line is drawing and flushing without a problem.  Spouse states that [facility] RN will check line patency on Tuesday  11/7/2023.    Educated patient and spouse of the importance of reporting to an emergency room if the pump should stop infusing or there would be continued alarms warning of occlusion or the pump stopping; this will be an medical emergency for the patient.  Patient and spouse voice understanding and repeat back education and instructions given for PICC line care.    Routed to CHANG Riley NP for awareness.  Confirmed RTC for Hospital F/U scheduled 11/16/2023.

## 2023-11-03 NOTE — PROGRESS NOTES
C3 nurse spoke with Radha Abbott for a TCC post hospital discharge follow up call. The patient has a scheduled [facility] appointment with Jazmín Perkins NP on 11/06/23 @ 0800.

## 2023-11-03 NOTE — PLAN OF CARE
[facility]              Heart Transplant Clinic  [address] [place] [address]              [phone] after hours                    HOME  HEALTH ORDERS        Admit to Home Health     Diagnosis:       Patient Active Problem List   Diagnosis    Ventricular tachycardia    CAD (coronary artery disease)    HFrEF (heart failure with reduced ejection fraction)    DM (diabetes mellitus)    Hypokalemia    IVÁN (acute kidney injury)    PAF (paroxysmal atrial fibrillation)    Ventricular fibrillation    Paroxysmal atrial fibrillation    Acute on chronic combined systolic and diastolic CHF, NYHA class 4    Defibrillator discharge         Patient is homebound due to:   Diet: Low Sodium  Acitivities: As Tolerated     Nursing:   SN to complete comprehensive assessment including routine vital signs. Instruct on disease process and s/s of complications to report to MD. Review/verify medication list sent home with the patient at time of discharge  and instruct patient/caregiver as needed. Frequency may be adjusted depending on start of care date.     Notify MD if SBP > 160 or < 90; DBP > 90 or < 50; HR > 120 or < 50; Temp > 101; Weight gain >3lbs in 1 day or 5lbs in 1 week.     LABS:  SN to perform labs: weekly CBC, BMP, Magnesium, BNP     HOME INFUSION THERAPY:    SN to perform Infusion Therapy/Central Line Care.  Review Central Line Care & Central Line Flush with patient.     Administer (drug and dose): Dobutamine, 5 mcg/kg/min, IV continuous, Dosing weight 77.9 kg.      **For questions or concerns, please call [phone] and ask for Pre-Heart transplant clinic, M-F 8-5. After hours, weekends, call [phone] and ask for the Heart Transplant Cardiologist on call.**     Central line care:  Scrub the Hub: Prior to accessing the line, always perform a 30 second alcohol scrub  Each lumen of the central line is to be flushed at least daily with 10 mL Normal Saline and 3 mL  Heparin flush (100 units/mL)  Skilled Nurse (SN) may draw blood from IV access  Blood Draw Procedure:              - Aspirate at least 5 mL of blood              - Discard              - Obtain specimen              - Change posiflow cap              - Flush with 20 mL Normal Saline followed by a                 3-5 mL Heparin flush (100 units/mL)  Central :              - Sterile dressing changes are done weekly and as needed.              - Use chlor-hexadine scrub to cleanse site, apply Biopatch to insertion site,                 apply securement device dressing              - Posi-flow caps are changed weekly and after EVERY lab draw.              - If sterile gauze is under dressing to control oozing,                 dressing change must be performed every 24 hours until gauze is not needed.     CONSULTS:       Send initial Home Health orders to HTS attending physician on call.  Send follow up questions to [phone] or fax:                                                                                                                                  Pre Transplant:   [phone]

## 2023-11-06 ENCOUNTER — OFFICE VISIT (OUTPATIENT)
Dept: HOME HEALTH SERVICES | Facility: CLINIC | Age: 63
End: 2023-11-06
Payer: MEDICAID

## 2023-11-06 VITALS
TEMPERATURE: 98 F | SYSTOLIC BLOOD PRESSURE: 92 MMHG | OXYGEN SATURATION: 97 % | HEART RATE: 81 BPM | RESPIRATION RATE: 18 BRPM | DIASTOLIC BLOOD PRESSURE: 60 MMHG

## 2023-11-06 DIAGNOSIS — I50.43 ACUTE ON CHRONIC COMBINED SYSTOLIC AND DIASTOLIC CHF, NYHA CLASS 4: ICD-10-CM

## 2023-11-06 PROCEDURE — 1159F MED LIST DOCD IN RCRD: CPT | Mod: CPTII,S$GLB,, | Performed by: NURSE PRACTITIONER

## 2023-11-06 PROCEDURE — 1111F PR DISCHARGE MEDS RECONCILED W/ CURRENT OUTPATIENT MED LIST: ICD-10-PCS | Mod: CPTII,S$GLB,, | Performed by: NURSE PRACTITIONER

## 2023-11-06 PROCEDURE — 1159F PR MEDICATION LIST DOCUMENTED IN MEDICAL RECORD: ICD-10-PCS | Mod: CPTII,S$GLB,, | Performed by: NURSE PRACTITIONER

## 2023-11-06 PROCEDURE — 3051F HG A1C>EQUAL 7.0%<8.0%: CPT | Mod: CPTII,S$GLB,, | Performed by: NURSE PRACTITIONER

## 2023-11-06 PROCEDURE — 4010F PR ACE/ARB THEARPY RXD/TAKEN: ICD-10-PCS | Mod: CPTII,S$GLB,, | Performed by: NURSE PRACTITIONER

## 2023-11-06 PROCEDURE — 99496 TRANSITIONAL CARE MANAGE SERVICE 7 DAY DISCHARGE: ICD-10-PCS | Mod: S$GLB,,, | Performed by: NURSE PRACTITIONER

## 2023-11-06 PROCEDURE — 1160F RVW MEDS BY RX/DR IN RCRD: CPT | Mod: CPTII,S$GLB,, | Performed by: NURSE PRACTITIONER

## 2023-11-06 PROCEDURE — 4010F ACE/ARB THERAPY RXD/TAKEN: CPT | Mod: CPTII,S$GLB,, | Performed by: NURSE PRACTITIONER

## 2023-11-06 PROCEDURE — 1160F PR REVIEW ALL MEDS BY PRESCRIBER/CLIN PHARMACIST DOCUMENTED: ICD-10-PCS | Mod: CPTII,S$GLB,, | Performed by: NURSE PRACTITIONER

## 2023-11-06 PROCEDURE — 1111F DSCHRG MED/CURRENT MED MERGE: CPT | Mod: CPTII,S$GLB,, | Performed by: NURSE PRACTITIONER

## 2023-11-06 PROCEDURE — 3074F PR MOST RECENT SYSTOLIC BLOOD PRESSURE < 130 MM HG: ICD-10-PCS | Mod: CPTII,S$GLB,, | Performed by: NURSE PRACTITIONER

## 2023-11-06 PROCEDURE — 3078F PR MOST RECENT DIASTOLIC BLOOD PRESSURE < 80 MM HG: ICD-10-PCS | Mod: CPTII,S$GLB,, | Performed by: NURSE PRACTITIONER

## 2023-11-06 PROCEDURE — 3078F DIAST BP <80 MM HG: CPT | Mod: CPTII,S$GLB,, | Performed by: NURSE PRACTITIONER

## 2023-11-06 PROCEDURE — 99496 TRANSJ CARE MGMT HIGH F2F 7D: CPT | Mod: S$GLB,,, | Performed by: NURSE PRACTITIONER

## 2023-11-06 PROCEDURE — 3051F PR MOST RECENT HEMOGLOBIN A1C LEVEL 7.0 - < 8.0%: ICD-10-PCS | Mod: CPTII,S$GLB,, | Performed by: NURSE PRACTITIONER

## 2023-11-06 PROCEDURE — 3074F SYST BP LT 130 MM HG: CPT | Mod: CPTII,S$GLB,, | Performed by: NURSE PRACTITIONER

## 2023-11-06 NOTE — PROGRESS NOTES
[facility]  Transitional Care Management (TCM) Home Visit    Encounter Provider: Jazmín Bhandari   PCP: Vasu Kong MD  Consult Requested By: No ref. provider found  Admit Date: 10/26/23   IP Discharge Date: 11/1/23  Hospital Length of Stay:RRHLOS@ days  Days since discharge (from IP or SNF): 5 days ago   [facility] Contact Note:  11/03/2023  Hospital Diagnosis: No admission diagnoses are documented for this encounter.     HISTORY OF PRESENT ILLNESS      Patient ID: Radha Abbott is a 63 y.o. male was recently admitted to the hospital, this is their TCM encounter.    Hospital Course Synopsis:    1) 63 year old male with hx of CAD s/p 3v CABG (unclear anatomy, 2009), ICM with a recent EF of 15-20% s/p ICD (medtronchristiano 2009), DM2 (a1c 7.7), HTN, HLD, Vfib on amio who presented as a direct admission from [facility]/PH clinic for worsening shortness of breath.    The patient was recently admitted to the [facility] service on 10/12 as a transfer from [facility] for IVÁN and CHF. During this time he was placed on . He got RHC on 10/13 which showed RA: 2 RV: 43/2 PA: 35/16, mean 23 PCWP: 12. Kidney function slowly recovering with holding of diuretics.  His GDMT (entresto, aldactone, jardiance) and diuretics were held at the time. Patient was found to not be a candidate for advanced therapies (due to insurance) here at [facility] and was discharged home on  5 with plan to follow up in clinic in order to help find an alternative center for OHTx/VAD. He had a subsequent admission on 10/19 with worsening dyspnea, orthopnea and PND. Was observed overnight and diuresed prior to discharge the following day. Patient presented today in clinic, and was noted to be short of breath to a point he was unable to complete his sentences in clinic.   2) Developments since hospitalization and current needs: patient reports still not sleeping well because he is unable to lay flat  3) Please confirm dates of admit, discharge, LOS above are  correct: Confirmed with the patient     DECISION MAKING TODAY       Assessment & Plan:  1. Acute on chronic combined systolic and diastolic CHF, NYHA class 4  Assessment & Plan:  Patient on dobutamine drip at 5mcg/kg/min continuous  Blood pressure at visit was 92/60 patient has no dizziness, lightheadedness, or chest pain  He is compliant with all medications  No edema noted to lower extremities  Follow up with cardiology as instructed           Medication List on Discharge:     Medication List            Accurate as of November 6, 2023 12:40 PM. If you have any questions, ask your nurse or doctor.                CONTINUE taking these medications      amiodarone 400 MG tablet  Commonly known as: PACERONE  Take 1 tablet (400 mg total) by mouth once daily.     aspirin 81 MG EC tablet  Commonly known as: ECOTRIN  Take 81 mg by mouth once daily.     atorvastatin 40 MG tablet  Commonly known as: LIPITOR  Take 40 mg by mouth.     DOBUTamine 1,000 mg/250 mL (4,000 mcg/mL) infusion  Commonly known as: DOBUTREX  Inject 389.5 mcg/min into the vein continuous.     ELIQUIS 5 mg Tab  Generic drug: apixaban  Take 1 tablet (5 mg total) by mouth 2 (two) times daily.     fish oil-omega-3 fatty acids 300-1,000 mg capsule  Take 2 g by mouth once daily.     furosemide 40 MG tablet  Commonly known as: LASIX  Take 1 tablet (40 mg total) by mouth 2 (two) times daily.     gabapentin 300 MG capsule  Commonly known as: NEURONTIN  Take 300 mg by mouth nightly as needed.     multivitamin capsule  Take 1 capsule by mouth once daily.     pantoprazole 40 MG tablet  Commonly known as: PROTONIX  Take 1 tablet (40 mg total) by mouth before breakfast.     potassium chloride SA 20 MEQ tablet  Commonly known as: K-DUR,KLOR-CON  Take 1 tablet (20 mEq total) by mouth once daily.     sacubitriL-valsartan 24-26 mg per tablet  Commonly known as: ENTRESTO  Take 1 tablet by mouth 2 (two) times daily.              Medication Reconciliation:  Were medications  changed on discharge? Yes  Were medications in the home? Yes  Is the patient taking the medications as directed? Yes  Does the patient understand the medications and changes? Yes  Does updated med list accurately reflects meds patient is currently taking? Yes    ENVIRONMENT OF CARE      Family and/or Caregiver present at visit?  Yes  Name of Caregiver: Arlyn  History provided by: patient    Advance Care Planning   Advanced Care Planning Status:  Patient has had an ACP conversation  Living Will: No  Power of : No  [place]: No    Does Caregiver have HCPoA: No  Changes today: None       Impression upon entering the home:  Physical Dwelling: single family home   Appearance of home environment: cleaniness: clean, walking pathways: clear, lighting: adequate, and home structure: sound structure  Functional Status: independent  Mobility: ambulatory  Nutritional access: adequate intake and access  Home Health: Yes, HH Agency [facility]    DME/Supplies: none     Diagnostic tests reviewed/disposition: No diagnosic tests pending after this hospitalization.  Disease/illness education: CHF  Establishment or re-establishment of referral orders for community resources: No other necessary community resources.   Discussion with other health care providers: No discussion with other health care providers necessary.   Does patient have a PCP at [facility]? Yes   Repatriation plan with PCP? follow-up with PCP within 30d   Does patient have an ostomy (ileostomy, colostomy, suprapubic catheter, nephrostomy tube, tracheostomy, PEG tube, pleurex catheter, cholecystostomy, etc)? No  Were BPAs reviewed? Yes    Social History     Socioeconomic History    Marital status:    Tobacco Use    Smoking status: Every Day     Current packs/day: 0.50     Types: Cigarettes   Substance and Sexual Activity    Alcohol use: Yes     Comment: rarely    Drug use: No     Social Determinants of Health     Financial Resource Strain: Low Risk   (10/27/2023)    Overall Financial Resource Strain (CARDIA)     Difficulty of Paying Living Expenses: Not hard at all   Food Insecurity: No Food Insecurity (10/27/2023)    Hunger Vital Sign     Worried About Running Out of Food in the Last Year: Never true     Ran Out of Food in the Last Year: Never true   Transportation Needs: No Transportation Needs (10/27/2023)    PRAPARE - Transportation     Lack of Transportation (Medical): No     Lack of Transportation (Non-Medical): No   Physical Activity: Inactive (10/27/2023)    Exercise Vital Sign     Days of Exercise per Week: 0 days     Minutes of Exercise per Session: 0 min   Stress: No Stress Concern Present (10/27/2023)    [employer] [place] [employer] - Occupational Stress Questionnaire     Feeling of Stress : Not at all   Social Connections: Moderately Integrated (10/27/2023)    Social Connection and Isolation Panel [NHANES]     Frequency of Communication with Friends and Family: More than three times a week     Frequency of Social Gatherings with Friends and Family: More than three times a week     Attends Baptism Services: More than 4 times per year     Active Member of Clubs or Organizations: No     Attends Club or Organization Meetings: Never     Marital Status:    Housing Stability: Low Risk  (10/27/2023)    Housing Stability Vital Sign     Unable to Pay for Housing in the Last Year: No     Number of Places Lived in the Last Year: 1     Unstable Housing in the Last Year: No         OBJECTIVE:     Vital Signs:  Vitals:    11/06/23 1106   BP: 92/60   Pulse: 81   Resp: 18   Temp: 97.5 °F (36.4 °C)       Review of Systems   Constitutional: Negative.    HENT: Negative.     Eyes: Negative.    Respiratory:  Positive for shortness of breath (with exertion).    Cardiovascular:  Positive for leg swelling.   Gastrointestinal: Negative.    Endocrine: Negative.    Genitourinary: Negative.    Musculoskeletal: Negative.    Skin: Negative.     Allergic/Immunologic: Negative.    Neurological: Negative.    Hematological: Negative.    Psychiatric/Behavioral:  Positive for sleep disturbance (cannot lay flat).        Physical Exam:  Physical Exam  Vitals reviewed.   Constitutional:       General: He is not in acute distress.  HENT:      Head: Normocephalic and atraumatic.      Nose: Nose normal.      Mouth/Throat:      Mouth: Mucous membranes are moist.      Pharynx: Oropharynx is clear.   Eyes:      Pupils: Pupils are equal, round, and reactive to light.   Cardiovascular:      Rate and Rhythm: Normal rate and regular rhythm.      Heart sounds: Normal heart sounds.   Pulmonary:      Breath sounds: Normal breath sounds.   Abdominal:      General: Abdomen is protuberant. Bowel sounds are decreased.      Palpations: Abdomen is soft.   Musculoskeletal:        Arms:       Cervical back: Neck supple.      Right lower leg: No edema.      Left lower leg: No edema.      Comments: PICC Line with dobutamine continuous infusion.  Defibrillator to upper left chest wall   Skin:     General: Skin is warm and dry.   Neurological:      Mental Status: He is alert and oriented to person, place, and time.   Psychiatric:         Mood and Affect: Mood normal.         Behavior: Behavior normal.         INSTRUCTIONS FOR PATIENT:     Scheduled Follow-up, Appts Reviewed with Modifications if Needed: Yes  Future Appointments   Date Time Provider Department Center   11/16/2023  9:00 AM Sajan Hurley MD [facility] [place] [facility]       Encounter for Medical Follow-Up and Medication Review  - [facility] to schedule follow-up visit with patient in 4 weeks or PRN     Patient Instructions Given:  - Continue all medications, treatments and therapies as ordered.   - Follow all instructions, recommendations as discussed.  - Maintain Safety Precautions at all times.  - Attend all medical appointments as scheduled.  - For worsening symptoms: call Primary Care Physician or Nurse  Practitioner.  - For emergencies, call 911 or immediately report to the nearest emergency room        Signature: Jazmín Bhandari NP    Transition of Care Visit:  I have reviewed and updated the history and problem list.  I have reconciled the medication list.  I have discussed the hospitalization and current medical issues, prognosis and plans with the patient/family.

## 2023-11-07 ENCOUNTER — LAB VISIT (OUTPATIENT)
Dept: LAB | Facility: HOSPITAL | Age: 63
End: 2023-11-07
Attending: INTERNAL MEDICINE
Payer: MEDICAID

## 2023-11-07 ENCOUNTER — DOCUMENTATION ONLY (OUTPATIENT)
Dept: TRANSPLANT | Facility: CLINIC | Age: 63
End: 2023-11-07
Payer: MEDICAID

## 2023-11-07 ENCOUNTER — NURSE TRIAGE (OUTPATIENT)
Dept: ADMINISTRATIVE | Facility: CLINIC | Age: 63
End: 2023-11-07
Payer: MEDICAID

## 2023-11-07 DIAGNOSIS — I50.43 ACUTE ON CHRONIC COMBINED SYSTOLIC AND DIASTOLIC HEART FAILURE: Primary | ICD-10-CM

## 2023-11-07 LAB
ANION GAP SERPL CALC-SCNC: 10 MMOL/L (ref 8–16)
BASOPHILS # BLD AUTO: 0.03 K/UL (ref 0–0.2)
BASOPHILS NFR BLD: 0.8 % (ref 0–1.9)
BNP SERPL-MCNC: 1113 PG/ML (ref 0–99)
BUN SERPL-MCNC: 46 MG/DL (ref 8–23)
CALCIUM SERPL-MCNC: 8.6 MG/DL (ref 8.7–10.5)
CHLORIDE SERPL-SCNC: 105 MMOL/L (ref 95–110)
CO2 SERPL-SCNC: 20 MMOL/L (ref 23–29)
CREAT SERPL-MCNC: 2.1 MG/DL (ref 0.5–1.4)
DIFFERENTIAL METHOD: ABNORMAL
EOSINOPHIL # BLD AUTO: 0 K/UL (ref 0–0.5)
EOSINOPHIL NFR BLD: 1.1 % (ref 0–8)
ERYTHROCYTE [DISTWIDTH] IN BLOOD BY AUTOMATED COUNT: 14.3 % (ref 11.5–14.5)
EST. GFR  (NO RACE VARIABLE): 35 ML/MIN/1.73 M^2
GLUCOSE SERPL-MCNC: 295 MG/DL (ref 70–110)
HCT VFR BLD AUTO: 26.3 % (ref 40–54)
HGB BLD-MCNC: 8.6 G/DL (ref 14–18)
IMM GRANULOCYTES # BLD AUTO: 0.02 K/UL (ref 0–0.04)
IMM GRANULOCYTES NFR BLD AUTO: 0.5 % (ref 0–0.5)
LYMPHOCYTES # BLD AUTO: 0.5 K/UL (ref 1–4.8)
LYMPHOCYTES NFR BLD: 14.2 % (ref 18–48)
MAGNESIUM SERPL-MCNC: 2.2 MG/DL (ref 1.6–2.6)
MCH RBC QN AUTO: 29.9 PG (ref 27–31)
MCHC RBC AUTO-ENTMCNC: 32.7 G/DL (ref 32–36)
MCV RBC AUTO: 91 FL (ref 82–98)
MONOCYTES # BLD AUTO: 0.3 K/UL (ref 0.3–1)
MONOCYTES NFR BLD: 7.1 % (ref 4–15)
NEUTROPHILS # BLD AUTO: 2.8 K/UL (ref 1.8–7.7)
NEUTROPHILS NFR BLD: 76.3 % (ref 38–73)
NRBC BLD-RTO: 0 /100 WBC
PLATELET # BLD AUTO: 209 K/UL (ref 150–450)
PMV BLD AUTO: 11.4 FL (ref 9.2–12.9)
POTASSIUM SERPL-SCNC: 4.9 MMOL/L (ref 3.5–5.1)
RBC # BLD AUTO: 2.88 M/UL (ref 4.6–6.2)
SODIUM SERPL-SCNC: 135 MMOL/L (ref 136–145)
WBC # BLD AUTO: 3.65 K/UL (ref 3.9–12.7)

## 2023-11-07 PROCEDURE — 80048 BASIC METABOLIC PNL TOTAL CA: CPT | Performed by: INTERNAL MEDICINE

## 2023-11-07 PROCEDURE — 83735 ASSAY OF MAGNESIUM: CPT | Performed by: INTERNAL MEDICINE

## 2023-11-07 PROCEDURE — 85025 COMPLETE CBC W/AUTO DIFF WBC: CPT | Performed by: INTERNAL MEDICINE

## 2023-11-07 PROCEDURE — 83880 ASSAY OF NATRIURETIC PEPTIDE: CPT | Performed by: INTERNAL MEDICINE

## 2023-11-07 PROCEDURE — 36415 COLL VENOUS BLD VENIPUNCTURE: CPT | Performed by: INTERNAL MEDICINE

## 2023-11-07 NOTE — PROGRESS NOTES
2023    Weekly inotrope labs (CBC, CMP, BNP & Mg) received, reviewed and present in Epic.        WBC:  3.65  (Patient trend 3.44-5.75)  Hgb:  8.6  (Patient trend 8.6- 12.6)    HCT:  26.3  (Patient trend 26.3-36.6)  Platelets: 209  (Patient trend 186-290)    NA+:  135  (Patient trend 134-139)  K+:  49  (Patient trend 3.5-5.0)  BUN:  46  (Patient trend 28-46)  CR:  2.1  (Patient trend 1.7-2.3)  M.2  (Patient trend 2.0-2.5)    BNP:  1113  (Patient trend 274-1113)

## 2023-11-08 ENCOUNTER — TELEPHONE (OUTPATIENT)
Dept: ADMINISTRATIVE | Facility: HOSPITAL | Age: 63
End: 2023-11-08
Payer: MEDICAID

## 2023-11-08 ENCOUNTER — TELEPHONE (OUTPATIENT)
Dept: TRANSPLANT | Facility: CLINIC | Age: 63
End: 2023-11-08
Payer: MEDICAID

## 2023-11-08 DIAGNOSIS — I50.43 ACUTE ON CHRONIC COMBINED SYSTOLIC AND DIASTOLIC CHF, NYHA CLASS 4: Primary | ICD-10-CM

## 2023-11-08 RX ORDER — METOLAZONE 2.5 MG/1
2.5 TABLET ORAL DAILY
Qty: 10 TABLET | Refills: 0 | Status: ON HOLD | OUTPATIENT
Start: 2023-11-08 | End: 2024-01-24 | Stop reason: HOSPADM

## 2023-11-08 RX ORDER — TORSEMIDE 20 MG/1
40 TABLET ORAL 2 TIMES DAILY
Qty: 120 TABLET | Refills: 11 | Status: ON HOLD | OUTPATIENT
Start: 2023-11-08 | End: 2024-01-24 | Stop reason: HOSPADM

## 2023-11-08 NOTE — TELEPHONE ENCOUNTER
Informed pt and wife on authorization. Both verbalized understanding. Will send message to  for appts.

## 2023-11-08 NOTE — TELEPHONE ENCOUNTER
11/8/2023     Received below message via the call center:    Pt's wife reports she sees that pt's lab work from earlier today is abnormal, they were not contacted about it. Wife/Pt advised to contact the office when it is open to discuss lab results per protocol, and it does show that the labs have been reviewed by the MD that ordered them. Wife denied that pt was having any symptoms that needed to be triaged at this time. Wife/Pt encouraged to call back with any worsening symptoms or questions. She verbalized understanding.    Will you please reach out to pt/wife to discuss the lab results asap, thank you!  ---------------------------------------------------------------------------------    Called and spoke with patient and spouse via phone.  Patient reports increased SOB at HS impeding sleep.  Patient reports edema in abd and left foot.  Patient reports weight today is 177.8 lbs, up from 174.5 lbs at time of discharge from hospital.  Spouse reports patients intake as 5567-7388 ml a day, output was 1000 ml.  Spouse reports patient is attempting to adhere to sodium restriction but it is very difficult.  Patient confirms the following medications:    -Amiodarone 400 mg PO daily  -Eliquis 5 mg PO BID  -Lasix 40 mg PO BID and PRN; patient has taken an additional 40 mg for the last 3 days R/T weight increase and abd bloating  - Potassium 20 meq PO daily  -Entresto 24-26 mg PO BID    *New continuous Dobutamine infusion 5 mcg/kg/minute    Confirmed hospital follow up appointment is scheduled 11/16/2023 with MD Gema Casas, labs will be drawn on Tuesday 11/14/2023 by [facility] for weekly In[facility] labs and RTC.    Reviewed and discussed the above with MD Mcintyre.  Received written orders below:    ---- Message from Walter Mcintyre MD sent at 11/8/2023 11:02 AM CST -----  Annette,    Lets switch him to Torsemide 40 mg BID. In addition to this lets have him take a metolazone 2.5 mg the next two days (Thursday and Friday). If he  continues to go up in weight despite these changes he needs to let us know ASAP.    Walter    Called and spoke with patient and spouse via phone.  Instructed patient  to discontinue taking lasix and will start Torsemide 40 mg BID. Instructed patient to take  metolazone 2.5 mg po daily the next two days (Thursday 11/9/23 and Friday 11/10/23). Instructed patient and spouse if patients weight continues to go up despite these changes we have made to medications please call and let us know ASAP  Patient and spouse voice understanding and repeat back all instructions correctly.  Prescriptions sent to [facility] in [facility], patient preferred pharmacy.

## 2023-11-08 NOTE — TELEPHONE ENCOUNTER
Pt's wife reports she sees that pt's lab work from earlier today is abnormal, they were not contacted about it. Wife/Pt advised to contact the office when it is open to discuss lab results per protocol, and it does show that the labs have been reviewed by the MD that ordered them. Wife denied that pt was having any symptoms that needed to be triaged at this time. Wife/Pt encouraged to call back with any worsening symptoms or questions. She verbalized understanding.    Reason for Disposition   [1] Caller requesting NON-URGENT health information AND [2] PCP's office is the best resource    Protocols used: Information Only Call - No Triage-A-

## 2023-11-09 ENCOUNTER — HOSPITAL ENCOUNTER (INPATIENT)
Facility: HOSPITAL | Age: 63
LOS: 76 days | Discharge: NURSING, REHAB OR LONG-TERM CARE | DRG: 001 | End: 2024-01-24
Attending: EMERGENCY MEDICINE | Admitting: INTERNAL MEDICINE
Payer: MEDICAID

## 2023-11-09 DIAGNOSIS — N17.9 ACUTE KIDNEY INJURY SUPERIMPOSED ON CHRONIC KIDNEY DISEASE: ICD-10-CM

## 2023-11-09 DIAGNOSIS — I50.9 CHF (CONGESTIVE HEART FAILURE): ICD-10-CM

## 2023-11-09 DIAGNOSIS — Z99.2 ANEMIA DUE TO CHRONIC KIDNEY DISEASE, ON CHRONIC DIALYSIS: ICD-10-CM

## 2023-11-09 DIAGNOSIS — I47.20 VENTRICULAR TACHYCARDIA: ICD-10-CM

## 2023-11-09 DIAGNOSIS — I48.0 PAF (PAROXYSMAL ATRIAL FIBRILLATION): ICD-10-CM

## 2023-11-09 DIAGNOSIS — D63.1 ANEMIA DUE TO CHRONIC KIDNEY DISEASE, ON CHRONIC DIALYSIS: ICD-10-CM

## 2023-11-09 DIAGNOSIS — Z91.89 AT HIGH RISK FOR SKIN BREAKDOWN: ICD-10-CM

## 2023-11-09 DIAGNOSIS — F43.22 ADJUSTMENT DISORDER WITH ANXIETY: ICD-10-CM

## 2023-11-09 DIAGNOSIS — N18.6 ANEMIA DUE TO CHRONIC KIDNEY DISEASE, ON CHRONIC DIALYSIS: ICD-10-CM

## 2023-11-09 DIAGNOSIS — I50.9 ACUTE ON CHRONIC HEART FAILURE: ICD-10-CM

## 2023-11-09 DIAGNOSIS — R65.21 SEPTIC SHOCK: ICD-10-CM

## 2023-11-09 DIAGNOSIS — I48.91 ATRIAL FIBRILLATION WITH TACHYCARDIC VENTRICULAR RATE: ICD-10-CM

## 2023-11-09 DIAGNOSIS — A41.9 SEPTIC SHOCK: ICD-10-CM

## 2023-11-09 DIAGNOSIS — N18.32 ACUTE RENAL FAILURE WITH ACUTE TUBULAR NECROSIS SUPERIMPOSED ON STAGE 3B CHRONIC KIDNEY DISEASE: ICD-10-CM

## 2023-11-09 DIAGNOSIS — R34 OLIGURIA: ICD-10-CM

## 2023-11-09 DIAGNOSIS — N18.9 ACUTE KIDNEY INJURY SUPERIMPOSED ON CHRONIC KIDNEY DISEASE: ICD-10-CM

## 2023-11-09 DIAGNOSIS — N17.0 ACUTE RENAL FAILURE WITH ACUTE TUBULAR NECROSIS SUPERIMPOSED ON STAGE 3B CHRONIC KIDNEY DISEASE: ICD-10-CM

## 2023-11-09 DIAGNOSIS — Z99.2 ACUTE RENAL FAILURE ON DIALYSIS: ICD-10-CM

## 2023-11-09 DIAGNOSIS — I50.20 HFREF (HEART FAILURE WITH REDUCED EJECTION FRACTION): ICD-10-CM

## 2023-11-09 DIAGNOSIS — Z74.09 IMPAIRED MOBILITY: ICD-10-CM

## 2023-11-09 DIAGNOSIS — I50.43 ACUTE ON CHRONIC COMBINED SYSTOLIC AND DIASTOLIC CHF, NYHA CLASS 4: ICD-10-CM

## 2023-11-09 DIAGNOSIS — R59.1 LYMPHADENOPATHY: ICD-10-CM

## 2023-11-09 DIAGNOSIS — R49.0 DYSPHONIA: ICD-10-CM

## 2023-11-09 DIAGNOSIS — I50.9 CONGESTIVE HEART FAILURE, UNSPECIFIED HF CHRONICITY, UNSPECIFIED HEART FAILURE TYPE: ICD-10-CM

## 2023-11-09 DIAGNOSIS — Z79.899 RECEIVING INOTROPIC MEDICATION: ICD-10-CM

## 2023-11-09 DIAGNOSIS — N17.9 AKI (ACUTE KIDNEY INJURY): ICD-10-CM

## 2023-11-09 DIAGNOSIS — I50.9 HEART FAILURE: ICD-10-CM

## 2023-11-09 DIAGNOSIS — Z95.811 LVAD (LEFT VENTRICULAR ASSIST DEVICE) PRESENT: ICD-10-CM

## 2023-11-09 DIAGNOSIS — Z71.89 ADVANCE CARE PLANNING: ICD-10-CM

## 2023-11-09 DIAGNOSIS — I50.43 ACUTE ON CHRONIC COMBINED SYSTOLIC AND DIASTOLIC HEART FAILURE: ICD-10-CM

## 2023-11-09 DIAGNOSIS — J38.01 UNILATERAL COMPLETE VOCAL FOLD PARALYSIS: ICD-10-CM

## 2023-11-09 DIAGNOSIS — F43.21 ADJUSTMENT DISORDER WITH DEPRESSED MOOD: ICD-10-CM

## 2023-11-09 DIAGNOSIS — R64 CACHEXIA: ICD-10-CM

## 2023-11-09 DIAGNOSIS — G93.40 ACUTE ENCEPHALOPATHY: ICD-10-CM

## 2023-11-09 DIAGNOSIS — R06.02 SOB (SHORTNESS OF BREATH): ICD-10-CM

## 2023-11-09 DIAGNOSIS — Z95.811 LVAD (LEFT VENTRICULAR ASSIST DEVICE) PRESENT: Primary | ICD-10-CM

## 2023-11-09 DIAGNOSIS — R45.89 DEPRESSED MOOD: ICD-10-CM

## 2023-11-09 DIAGNOSIS — Z51.5 PALLIATIVE CARE ENCOUNTER: ICD-10-CM

## 2023-11-09 DIAGNOSIS — R06.02 SHORTNESS OF BREATH: ICD-10-CM

## 2023-11-09 DIAGNOSIS — T14.8XXA BLOOD BLISTER: ICD-10-CM

## 2023-11-09 DIAGNOSIS — D72.829 LEUKOCYTOSIS, UNSPECIFIED TYPE: ICD-10-CM

## 2023-11-09 DIAGNOSIS — N17.0 ACUTE RENAL FAILURE WITH TUBULAR NECROSIS: ICD-10-CM

## 2023-11-09 DIAGNOSIS — N17.9 ACUTE RENAL FAILURE ON DIALYSIS: ICD-10-CM

## 2023-11-09 DIAGNOSIS — I50.9 DECOMPENSATED HEART FAILURE: ICD-10-CM

## 2023-11-09 DIAGNOSIS — E44.0 MODERATE MALNUTRITION: ICD-10-CM

## 2023-11-09 DIAGNOSIS — I50.9 HEART FAILURE, UNSPECIFIED HF CHRONICITY, UNSPECIFIED HEART FAILURE TYPE: ICD-10-CM

## 2023-11-09 DIAGNOSIS — E11.9 TYPE 2 DIABETES MELLITUS WITHOUT COMPLICATION, WITHOUT LONG-TERM CURRENT USE OF INSULIN: ICD-10-CM

## 2023-11-09 DIAGNOSIS — T14.8XXA DEEP TISSUE INJURY: ICD-10-CM

## 2023-11-09 LAB
ALBUMIN SERPL BCP-MCNC: 3.7 G/DL (ref 3.5–5.2)
ALLENS TEST: NORMAL
ALP SERPL-CCNC: 85 U/L (ref 55–135)
ALT SERPL W/O P-5'-P-CCNC: 42 U/L (ref 10–44)
ANION GAP SERPL CALC-SCNC: 10 MMOL/L (ref 8–16)
ANION GAP SERPL CALC-SCNC: 11 MMOL/L (ref 8–16)
AST SERPL-CCNC: 26 U/L (ref 10–40)
BASOPHILS # BLD AUTO: 0.03 K/UL (ref 0–0.2)
BASOPHILS NFR BLD: 0.8 % (ref 0–1.9)
BILIRUB SERPL-MCNC: 0.8 MG/DL (ref 0.1–1)
BNP SERPL-MCNC: 1204 PG/ML (ref 0–99)
BUN SERPL-MCNC: 56 MG/DL (ref 8–23)
BUN SERPL-MCNC: 57 MG/DL (ref 8–23)
CALCIUM SERPL-MCNC: 9.1 MG/DL (ref 8.7–10.5)
CALCIUM SERPL-MCNC: 9.5 MG/DL (ref 8.7–10.5)
CHLORIDE SERPL-SCNC: 102 MMOL/L (ref 95–110)
CHLORIDE SERPL-SCNC: 104 MMOL/L (ref 95–110)
CO2 SERPL-SCNC: 21 MMOL/L (ref 23–29)
CO2 SERPL-SCNC: 21 MMOL/L (ref 23–29)
CREAT SERPL-MCNC: 2.2 MG/DL (ref 0.5–1.4)
CREAT SERPL-MCNC: 2.6 MG/DL (ref 0.5–1.4)
DIFFERENTIAL METHOD BLD: ABNORMAL
EOSINOPHIL # BLD AUTO: 0.1 K/UL (ref 0–0.5)
EOSINOPHIL NFR BLD: 2 % (ref 0–8)
ERYTHROCYTE [DISTWIDTH] IN BLOOD BY AUTOMATED COUNT: 14.5 % (ref 11.5–14.5)
EST. GFR  (NO RACE VARIABLE): 27.2 ML/MIN/1.73 M^2
EST. GFR  (NO RACE VARIABLE): 33.2 ML/MIN/1.73 M^2
GLUCOSE SERPL-MCNC: 167 MG/DL (ref 70–110)
GLUCOSE SERPL-MCNC: 197 MG/DL (ref 70–110)
HCT VFR BLD AUTO: 26.6 % (ref 40–54)
HGB BLD-MCNC: 8.9 G/DL (ref 14–18)
IMM GRANULOCYTES # BLD AUTO: 0.01 K/UL (ref 0–0.04)
IMM GRANULOCYTES NFR BLD AUTO: 0.3 % (ref 0–0.5)
LDH SERPL L TO P-CCNC: 1.18 MMOL/L (ref 0.5–2.2)
LYMPHOCYTES # BLD AUTO: 0.9 K/UL (ref 1–4.8)
LYMPHOCYTES NFR BLD: 23.9 % (ref 18–48)
MCH RBC QN AUTO: 30.1 PG (ref 27–31)
MCHC RBC AUTO-ENTMCNC: 33.5 G/DL (ref 32–36)
MCV RBC AUTO: 90 FL (ref 82–98)
MONOCYTES # BLD AUTO: 0.2 K/UL (ref 0.3–1)
MONOCYTES NFR BLD: 5.6 % (ref 4–15)
NEUTROPHILS # BLD AUTO: 2.4 K/UL (ref 1.8–7.7)
NEUTROPHILS NFR BLD: 67.4 % (ref 38–73)
NRBC BLD-RTO: 0 /100 WBC
PLATELET # BLD AUTO: 218 K/UL (ref 150–450)
PMV BLD AUTO: 10.5 FL (ref 9.2–12.9)
POCT GLUCOSE: 189 MG/DL (ref 70–110)
POTASSIUM SERPL-SCNC: 4.4 MMOL/L (ref 3.5–5.1)
POTASSIUM SERPL-SCNC: 4.9 MMOL/L (ref 3.5–5.1)
PROT SERPL-MCNC: 7.5 G/DL (ref 6–8.4)
RBC # BLD AUTO: 2.96 M/UL (ref 4.6–6.2)
SAMPLE: NORMAL
SITE: NORMAL
SODIUM SERPL-SCNC: 133 MMOL/L (ref 136–145)
SODIUM SERPL-SCNC: 136 MMOL/L (ref 136–145)
TROPONIN I SERPL DL<=0.01 NG/ML-MCNC: 0.05 NG/ML (ref 0–0.03)
WBC # BLD AUTO: 3.55 K/UL (ref 3.9–12.7)

## 2023-11-09 PROCEDURE — 83605 ASSAY OF LACTIC ACID: CPT | Mod: NTX

## 2023-11-09 PROCEDURE — 63600175 PHARM REV CODE 636 W HCPCS: Mod: NTX | Performed by: STUDENT IN AN ORGANIZED HEALTH CARE EDUCATION/TRAINING PROGRAM

## 2023-11-09 PROCEDURE — 80053 COMPREHEN METABOLIC PANEL: CPT | Mod: NTX | Performed by: EMERGENCY MEDICINE

## 2023-11-09 PROCEDURE — 93005 ELECTROCARDIOGRAM TRACING: CPT | Mod: NTX

## 2023-11-09 PROCEDURE — 99285 EMERGENCY DEPT VISIT HI MDM: CPT | Mod: 25,NTX

## 2023-11-09 PROCEDURE — 12000002 HC ACUTE/MED SURGE SEMI-PRIVATE ROOM: Mod: NTX

## 2023-11-09 PROCEDURE — 99900035 HC TECH TIME PER 15 MIN (STAT): Mod: NTX

## 2023-11-09 PROCEDURE — 99223 1ST HOSP IP/OBS HIGH 75: CPT | Mod: NTX,,, | Performed by: INTERNAL MEDICINE

## 2023-11-09 PROCEDURE — 80048 BASIC METABOLIC PNL TOTAL CA: CPT | Mod: NTX,XB | Performed by: STUDENT IN AN ORGANIZED HEALTH CARE EDUCATION/TRAINING PROGRAM

## 2023-11-09 PROCEDURE — 93010 ELECTROCARDIOGRAM REPORT: CPT | Mod: NTX,,, | Performed by: INTERNAL MEDICINE

## 2023-11-09 PROCEDURE — 85025 COMPLETE CBC W/AUTO DIFF WBC: CPT | Mod: NTX | Performed by: EMERGENCY MEDICINE

## 2023-11-09 PROCEDURE — 25000003 PHARM REV CODE 250: Mod: NTX | Performed by: STUDENT IN AN ORGANIZED HEALTH CARE EDUCATION/TRAINING PROGRAM

## 2023-11-09 PROCEDURE — 83880 ASSAY OF NATRIURETIC PEPTIDE: CPT | Mod: NTX | Performed by: EMERGENCY MEDICINE

## 2023-11-09 PROCEDURE — 84484 ASSAY OF TROPONIN QUANT: CPT | Mod: NTX | Performed by: EMERGENCY MEDICINE

## 2023-11-09 RX ORDER — ASPIRIN 81 MG/1
81 TABLET ORAL DAILY
Status: DISCONTINUED | OUTPATIENT
Start: 2023-11-09 | End: 2023-12-01

## 2023-11-09 RX ORDER — ATORVASTATIN CALCIUM 10 MG/1
40 TABLET, FILM COATED ORAL DAILY
Status: DISCONTINUED | OUTPATIENT
Start: 2023-11-09 | End: 2023-12-04

## 2023-11-09 RX ORDER — IBUPROFEN 200 MG
16 TABLET ORAL
Status: DISCONTINUED | OUTPATIENT
Start: 2023-11-09 | End: 2023-12-01

## 2023-11-09 RX ORDER — DOBUTAMINE HYDROCHLORIDE 400 MG/100ML
5 INJECTION INTRAVENOUS CONTINUOUS
Status: DISCONTINUED | OUTPATIENT
Start: 2023-11-09 | End: 2023-12-09

## 2023-11-09 RX ORDER — FUROSEMIDE 10 MG/ML
80 INJECTION INTRAMUSCULAR; INTRAVENOUS ONCE
Status: COMPLETED | OUTPATIENT
Start: 2023-11-09 | End: 2023-11-09

## 2023-11-09 RX ORDER — GLUCAGON 1 MG
1 KIT INJECTION
Status: DISCONTINUED | OUTPATIENT
Start: 2023-11-09 | End: 2023-12-01

## 2023-11-09 RX ORDER — POTASSIUM CHLORIDE 20 MEQ/1
20 TABLET, EXTENDED RELEASE ORAL DAILY
Status: DISCONTINUED | OUTPATIENT
Start: 2023-11-09 | End: 2023-11-09

## 2023-11-09 RX ORDER — INSULIN ASPART 100 [IU]/ML
0-10 INJECTION, SOLUTION INTRAVENOUS; SUBCUTANEOUS
Status: DISCONTINUED | OUTPATIENT
Start: 2023-11-09 | End: 2023-11-10

## 2023-11-09 RX ORDER — GABAPENTIN 300 MG/1
300 CAPSULE ORAL NIGHTLY PRN
Status: DISCONTINUED | OUTPATIENT
Start: 2023-11-09 | End: 2023-12-06

## 2023-11-09 RX ORDER — PANTOPRAZOLE SODIUM 40 MG/1
40 TABLET, DELAYED RELEASE ORAL
Status: DISCONTINUED | OUTPATIENT
Start: 2023-11-10 | End: 2023-12-02

## 2023-11-09 RX ORDER — AMIODARONE HYDROCHLORIDE 200 MG/1
400 TABLET ORAL DAILY
Status: DISCONTINUED | OUTPATIENT
Start: 2023-11-09 | End: 2023-12-04

## 2023-11-09 RX ORDER — IBUPROFEN 200 MG
24 TABLET ORAL
Status: DISCONTINUED | OUTPATIENT
Start: 2023-11-09 | End: 2023-12-01

## 2023-11-09 RX ADMIN — ATORVASTATIN CALCIUM 40 MG: 40 TABLET, FILM COATED ORAL at 10:11

## 2023-11-09 RX ADMIN — AMIODARONE HYDROCHLORIDE 400 MG: 200 TABLET ORAL at 10:11

## 2023-11-09 RX ADMIN — FUROSEMIDE 10 MG/HR: 10 INJECTION, SOLUTION INTRAMUSCULAR; INTRAVENOUS at 11:11

## 2023-11-09 RX ADMIN — APIXABAN 5 MG: 5 TABLET, FILM COATED ORAL at 09:11

## 2023-11-09 RX ADMIN — THERA TABS 1 TABLET: TAB at 10:11

## 2023-11-09 RX ADMIN — APIXABAN 5 MG: 5 TABLET, FILM COATED ORAL at 10:11

## 2023-11-09 RX ADMIN — ASPIRIN 81 MG: 81 TABLET, COATED ORAL at 10:11

## 2023-11-09 RX ADMIN — DOBUTAMINE IN DEXTROSE 4.96 MCG/KG/MIN: 400 INJECTION, SOLUTION INTRAVENOUS at 11:11

## 2023-11-09 RX ADMIN — FUROSEMIDE 80 MG: 10 INJECTION, SOLUTION INTRAVENOUS at 10:11

## 2023-11-09 RX ADMIN — INSULIN ASPART 2 UNITS: 100 INJECTION, SOLUTION INTRAVENOUS; SUBCUTANEOUS at 04:11

## 2023-11-09 NOTE — ASSESSMENT & PLAN NOTE
Pt with known CKD2b presenting with acute on chronic kidney disease.  Baseline Cr around 2.1  - diurese  - hold home entresto

## 2023-11-09 NOTE — Clinical Note
The PA catheter was repositioned to the main pulmonary artery. Hemodynamics were performed. O2 saturation was measured at 47%. CO = 5.80  CI = 2.98

## 2023-11-09 NOTE — ASSESSMENT & PLAN NOTE
Pt with known pAF currently in NSR.  CV4  - continue home amiodarone 400mg qD  - AC with eliquis 5mg BID  - continue home protonix 40mg qD

## 2023-11-09 NOTE — ED PROVIDER NOTES
Encounter Date: 11/9/2023       History     Chief Complaint   Patient presents with    Shortness of Breath     Sob since yesterday. PICC line with dobutamine for HF.      63 year old male with hx of CAD s/p 3v CABG (unclear anatomy, 2009), ICM with a recent EF of 15-20% s/p ICD (elsa 2009) on dobutamine via PICC, DM2 (a1c 7.7), HTN, HLD, Vfib on amio here for sob.  Patient states symptoms started yesterday around 2:00 p.m..  His weight also increased from 177 to 180.  His shortness of breath is worse with exertion, was unable to sleep at all last night because he was unable to lie flat.  He denies fevers or chills.  No cough.  He did note slight swelling to both of his feet but no abdominal discomfort or distention.        Review of patient's allergies indicates:  No Known Allergies  Past Medical History:   Diagnosis Date    CAD (coronary artery disease)     Diabetes mellitus     HFrEF (heart failure with reduced ejection fraction)     ICD (implantable cardioverter-defibrillator) in place     MI, old      Past Surgical History:   Procedure Laterality Date    CARDIAC SURGERY      RIGHT HEART CATHETERIZATION Right 10/10/2023    Procedure: INSERTION, CATHETER, RIGHT HEART;  Surgeon: Bin Gandhi MD;  Location: [facility] CATH LAB;  Service: Cardiology;  Laterality: Right;    RIGHT HEART CATHETERIZATION Right 10/13/2023    Procedure: INSERTION, CATHETER, RIGHT HEART;  Surgeon: Walter Mcintyre MD;  Location: [facility] CATH LAB;  Service: Cardiology;  Laterality: Right;     No family history on file.  Social History     Tobacco Use    Smoking status: Every Day     Current packs/day: 0.50     Types: Cigarettes   Substance Use Topics    Alcohol use: Yes     Comment: rarely    Drug use: No     Review of Systems    Physical Exam     Initial Vitals [11/09/23 0618]   BP Pulse Resp Temp SpO2   (!) 97/53 82 15 98 °F (36.7 °C) 99 %      MAP       --         Physical Exam    Nursing note and vitals reviewed.  Constitutional: He  appears well-developed and well-nourished. No distress.   HENT:   Mouth/Throat: Oropharynx is clear and moist.   Eyes: Conjunctivae are normal.   Neck: Neck supple.   Cardiovascular:  Normal rate, regular rhythm and intact distal pulses.           RUE picc line   Pulmonary/Chest: Breath sounds normal. He has no wheezes. He has no rales.   Abdominal: Abdomen is soft. Bowel sounds are normal. There is no abdominal tenderness.   Musculoskeletal:         General: Edema (+ trace edema to feet) present.      Cervical back: Neck supple.     Lymphadenopathy:     He has no cervical adenopathy.   Neurological: He is alert and oriented to person, place, and time.   Skin: No rash noted.   + warm and perfused     Psychiatric: He has a normal mood and affect.         ED Course   Procedures  Labs Reviewed   CBC W/ AUTO DIFFERENTIAL - Abnormal; Notable for the following components:       Result Value    WBC 3.55 (*)     RBC 2.96 (*)     Hemoglobin 8.9 (*)     Hematocrit 26.6 (*)     Lymph # 0.9 (*)     Mono # 0.2 (*)     All other components within normal limits   COMPREHENSIVE METABOLIC PANEL - Abnormal; Notable for the following components:    Sodium 133 (*)     CO2 21 (*)     Glucose 197 (*)     BUN 57 (*)     Creatinine 2.6 (*)     eGFR 27.2 (*)     All other components within normal limits   TROPONIN I - Abnormal; Notable for the following components:    Troponin I 0.053 (*)     All other components within normal limits   B-TYPE NATRIURETIC PEPTIDE - Abnormal; Notable for the following components:    BNP 1,204 (*)     All other components within normal limits   BASIC METABOLIC PANEL   ISTAT LACTATE   POCT GLUCOSE MONITORING CONTINUOUS     EKG Readings: (Independently Interpreted)   EKG:  Sinus rhythm at 78 with a first-degree AV block, left bundle-branch, no acute ischemic changes     ECG Results              EKG 12-lead (Final result)  Result time 11/09/23 09:17:32      Final result by Interface, Lab In [facility] (11/09/23  09:17:32)                   Narrative:    Test Reason : R06.02,    Vent. Rate : 078 BPM     Atrial Rate : 078 BPM     P-R Int : 226 ms          QRS Dur : 144 ms      QT Int : 426 ms       P-R-T Axes : 056 -22 055 degrees     QTc Int : 485 ms    Sinus rhythm with Fusion complexes  Intraventricular conduction disturbance, nonspecific type  Abnormal ECG  When compared with ECG of 19-OCT-2023 03:44,  No significant change was found  Confirmed by Erasmo Serna MD ([id number]) on 11/9/2023 9:17:26 AM    Referred By: AUSTIN   SELF           Confirmed By:Erasmo Serna MD                                  Imaging Results              X-Ray Chest AP Portable (Final result)  Result time 11/09/23 09:56:37      Final result by Jake Gupta MD (11/09/23 09:56:37)                   Impression:      The cardiopericardial silhouette remains enlarged.    Mild pulmonary venous hypertension, possibly related to CHF.    Additional findings as above.      Electronically signed by: Jake Gupta  Date:    11/09/2023  Time:    09:56               Narrative:    EXAMINATION:  XR CHEST AP PORTABLE    CLINICAL HISTORY:  CHF;    TECHNIQUE:  Single frontal view of the chest was performed.    COMPARISON:  Portable chest x-ray 10/30/2023    FINDINGS:  Midline thoracic trachea, allowing for rightward rotation of the patient's torso.    The cardiopericardial silhouette remains enlarged, similar to the comparison study.    Left subclavian transvenous ICD, sternotomy wires, and visualized portions of a right upper extremity PICC line demonstrate no adverse interval changes from 10/30/2023.    Mildly engorged pulmonary vasculature, especially in the upper lobes.  Although patient positioning could contribute to this, some underlying pulmonary venous hypertension is not excluded.    Hazy increased opacities projecting over either lower lung are favored to be related to superimposed chest wall soft tissues.  Some new underlying pulmonary or pleural  abnormalities, such as mild pulmonary edema, is not excluded.    No discrete pneumothorax.    No blunting of either lateral costophrenic angle.    Degenerative changes in the suboptimally visualized spine.                                       Medications   amiodarone tablet 400 mg (has no administration in time range)   apixaban tablet 5 mg (has no administration in time range)   aspirin EC tablet 81 mg (has no administration in time range)   atorvastatin tablet 40 mg (has no administration in time range)   DOBUtamine 1000 mg in D5W 250 mL infusion (has no administration in time range)   gabapentin capsule 300 mg (has no administration in time range)   multivitamin tablet (has no administration in time range)   pantoprazole EC tablet 40 mg (has no administration in time range)   furosemide injection 80 mg (has no administration in time range)   furosemide (LASIX) 500 mg in 50 mL infusion (conc: 10 mg/mL) (has no administration in time range)   glucose chewable tablet 16 g (has no administration in time range)   glucose chewable tablet 24 g (has no administration in time range)   glucagon (human recombinant) injection 1 mg (has no administration in time range)   insulin aspart U-100 pen 0-10 Units (has no administration in time range)   dextrose 10% bolus 125 mL 125 mL (has no administration in time range)   dextrose 10% bolus 250 mL 250 mL (has no administration in time range)     Medical Decision Making  Emergent evaluation a 63-year-old male history of HFrEF on dobutamine drip here for worsening shortness of breath.    Vital signs currently stable.  He is speaking clear complete sentences without respiratory distress.    Differential includes but not limited to decompensated heart failure, pneumonia, pleural effusion, worsening pulmonary edema, cardiogenic shock    Plan for labs, EKG, cardiac monitor, HTS    Amount and/or Complexity of Data Reviewed  Labs: ordered. Decision-making details documented in ED  Course.  Radiology: ordered and independent interpretation performed. Decision-making details documented in ED Course.  ECG/medicine tests: ordered and independent interpretation performed. Decision-making details documented in ED Course.  Discussion of management or test interpretation with external provider(s): Cardiology    Risk  Decision regarding hospitalization.    Critical Care  Total time providing critical care: 45 minutes               ED Course as of 11/09/23 1009   Thu Nov 09, 2023   0753 BNP(!): 1,204 [GM]   0753 Troponin I(!): 0.053 [GM]   0753 POC Lactate: 1.18 [GM]   0753 Hemoglobin(!): 8.9 [GM]   0753 BUN(!): 57 [GM]   0753 Creatinine(!): 2.6 [GM]   0753 Labs reviewed with no leukocytosis.  Hemoglobin stable.  BNP and troponin elevated, consistent with decompensated heart failure.  Creatinine is upper trending, 2.6 now up from 2.1 previously.  Lactate normal.    He is currently warm and well perfused, I do not think he is in cardiogenic shock.  His BP is at his baseline.    Will consult HTN [GM]   0843 Chest x-ray reviewed, cardiomegaly with mild congestive changes. [GM]   0843 Will consult cardiology for admission   [GM]      ED Course User Index  [GM] Heavenly Rajput MD                    Clinical Impression:   Final diagnoses:  [R06.02] Shortness of breath  [R06.02] SOB (shortness of breath)  [N17.9] IVÁN (acute kidney injury) (Primary)  [I50.9] Decompensated heart failure        ED Disposition Condition    Admit                 Heavenly Rajput MD  11/09/23 1009       Heavenly Rajput MD  11/09/23 1114

## 2023-11-09 NOTE — ASSESSMENT & PLAN NOTE
Last a1c 7.6.  Not on insulin at home  - will start [place] SSI  - CCU team to consider endocrine consult

## 2023-11-09 NOTE — ED NOTES
"Radha Abbott, a 63 y.o. male presents to the ED w/ complaint of SOB   ` SOB since yesterday. Weight gain of 6lbs within 24hr period. PICC line. Dobutamine Pump. Wife reports "HR-EF 15-20%"    Triage note:  Chief Complaint   Patient presents with    Shortness of Breath     Sob since yesterday. PICC line with dobutamine for HF.      Review of patient's allergies indicates:  No Known Allergies  Past Medical History:   Diagnosis Date    CAD (coronary artery disease)     Diabetes mellitus     HFrEF (heart failure with reduced ejection fraction)     ICD (implantable cardioverter-defibrillator) in place     MI, old      "

## 2023-11-09 NOTE — Clinical Note
The PA catheter was repositioned to the main pulmonary artery. Hemodynamics were performed. O2 saturation was measured at 34%. CO- 4.56  CI- 2.32

## 2023-11-09 NOTE — SUBJECTIVE & OBJECTIVE
Past Medical History:   Diagnosis Date    CAD (coronary artery disease)     Diabetes mellitus     HFrEF (heart failure with reduced ejection fraction)     ICD (implantable cardioverter-defibrillator) in place     MI, old        Past Surgical History:   Procedure Laterality Date    CARDIAC SURGERY      RIGHT HEART CATHETERIZATION Right 10/10/2023    Procedure: INSERTION, CATHETER, RIGHT HEART;  Surgeon: Bin Gandhi MD;  Location: [facility] CATH LAB;  Service: Cardiology;  Laterality: Right;    RIGHT HEART CATHETERIZATION Right 10/13/2023    Procedure: INSERTION, CATHETER, RIGHT HEART;  Surgeon: Walter Mcintyre MD;  Location: [facility] CATH LAB;  Service: Cardiology;  Laterality: Right;       Review of patient's allergies indicates:  No Known Allergies    No current facility-administered medications on file prior to encounter.     Current Outpatient Medications on File Prior to Encounter   Medication Sig    amiodarone (PACERONE) 400 MG tablet Take 1 tablet (400 mg total) by mouth once daily.    apixaban (ELIQUIS) 5 mg Tab Take 1 tablet (5 mg total) by mouth 2 (two) times daily.    aspirin (ECOTRIN) 81 MG EC tablet Take 81 mg by mouth once daily.    atorvastatin (LIPITOR) 40 MG tablet Take 40 mg by mouth.    DOBUTamine (DOBUTREX) 1,000 mg/250 mL (4,000 mcg/mL) infusion Inject 389.5 mcg/min into the vein continuous.    fish oil-omega-3 fatty acids 300-1,000 mg capsule Take 2 g by mouth once daily.    furosemide (LASIX) 40 MG tablet Take 1 tablet (40 mg total) by mouth 2 (two) times daily.    gabapentin (NEURONTIN) 300 MG capsule Take 300 mg by mouth nightly as needed.    multivitamin capsule Take 1 capsule by mouth once daily.    pantoprazole (PROTONIX) 40 MG tablet Take 1 tablet (40 mg total) by mouth before breakfast.    potassium chloride SA (K-DUR,KLOR-CON) 20 MEQ tablet Take 1 tablet (20 mEq total) by mouth once daily.    sacubitriL-valsartan (ENTRESTO) 24-26 mg per tablet Take 1 tablet by mouth 2 (two) times daily.     metOLazone (ZAROXOLYN) 2.5 MG tablet Take 1 tablet (2.5 mg total) by mouth once daily. Thursday 11/9 and Saturday 11/10    torsemide (DEMADEX) 20 MG Tab Take 2 tablets (40 mg total) by mouth 2 (two) times a day.     Family History    None       Tobacco Use    Smoking status: Every Day     Current packs/day: 0.50     Types: Cigarettes    Smokeless tobacco: Not on file   Substance and Sexual Activity    Alcohol use: Yes     Comment: rarely    Drug use: No    Sexual activity: Not on file     Review of Systems   Constitutional: Positive for weight gain. Negative for fever and malaise/fatigue.   HENT:  Negative for congestion and sore throat.    Eyes:  Negative for blurred vision, vision loss in left eye and vision loss in right eye.   Cardiovascular:  Positive for dyspnea on exertion, leg swelling and orthopnea. Negative for chest pain, irregular heartbeat, near-syncope, palpitations and syncope.   Respiratory:  Positive for shortness of breath. Negative for wheezing.    Endocrine: Negative.    Hematologic/Lymphatic: Negative.    Skin: Negative.    Musculoskeletal:  Negative for arthritis, back pain, joint pain and myalgias.   Gastrointestinal:  Negative for abdominal pain, hematemesis, hematochezia and melena.   Genitourinary: Negative.    Neurological:  Negative for light-headedness and loss of balance.   Psychiatric/Behavioral: Negative.       Objective:     Vital Signs (Most Recent):  Temp: 98 °F (36.7 °C) (11/09/23 0618)  Pulse: 80 (11/09/23 0741)  Resp: 20 (11/09/23 0741)  BP: 106/68 (11/09/23 0741)  SpO2: 99 % (11/09/23 0741) Vital Signs (24h Range):  Temp:  [98 °F (36.7 °C)] 98 °F (36.7 °C)  Pulse:  [79-82] 80  Resp:  [15-21] 20  SpO2:  [99 %-100 %] 99 %  BP: ()/(53-69) 106/68     Weight: 78.5 kg (173 lb)  Body mass index is 23.46 kg/m².    SpO2: 99 %       No intake or output data in the 24 hours ending 11/09/23 0925    Lines/Drains/Airways       Peripherally Inserted Central Catheter Line  Duration              PICC Double Lumen 10/13/23 1542 right basilic 26 days              Peripheral Intravenous Line  Duration                  Peripheral IV - Single Lumen 11/09/23 0656 20 G Left Antecubital <1 day                     Physical Exam  Vitals and nursing note reviewed.   Constitutional:       Appearance: Normal appearance. He is normal weight. He is not toxic-appearing.   HENT:      Head: Normocephalic and atraumatic.   Eyes:      General: No scleral icterus.     Extraocular Movements: Extraocular movements intact.   Neck:      Vascular: No carotid bruit.      Comments: Unable to appreciate any JVD  Cardiovascular:      Rate and Rhythm: Normal rate and regular rhythm.      Pulses: Normal pulses.      Heart sounds: Murmur heard.      No gallop.   Pulmonary:      Comments: Crackles bilaterally throughout all lung fields  Abdominal:      General: Abdomen is flat. Bowel sounds are normal.      Palpations: Abdomen is soft. There is no mass.   Musculoskeletal:      Cervical back: Normal range of motion.      Right lower leg: Edema present.      Left lower leg: Edema present.   Skin:     General: Skin is warm and dry.      Capillary Refill: Capillary refill takes less than 2 seconds.      Findings: No rash.   Neurological:      General: No focal deficit present.      Mental Status: He is alert and oriented to person, place, and time. Mental status is at baseline.          Significant Labs: All pertinent lab results from the last 24 hours have been reviewed.    Significant Imaging: Echocardiogram: Transthoracic echo (TTE) complete (Cupid Only):   Results for orders placed or performed during the hospital encounter of 10/19/23   Echo   Result Value Ref Range    Ascending aorta 3.27 cm    STJ 3.03 cm    AV mean gradient 2 mmHg    Ao peak gisselle 0.81 m/s    Ao VTI 11.08 cm    IVS 0.62 0.6 - 1.1 cm    LA size 4.63 cm    Left Atrium Major Axis 6.73 cm    Left Atrium Minor Axis 7.19 cm    LVIDd 7.1 (A) 3.5 - 6.0 cm    LVIDs 6.20 (A)  2.1 - 4.0 cm    LVOT diameter 2.01 cm    LVOT peak VTI 10.20 cm    Posterior Wall 1.20 (A) 0.6 - 1.1 cm    MV Peak A Zeferino 0.35 m/s    E wave deceleration time 93.35 msec    MV Peak E Zeferino 1.17 m/s    RA Major Axis 5.17 cm    RA Width 3.59 cm    RVDD 3.30 cm    Sinus 3.20 cm    TAPSE 1.87 cm    LA WIDTH 5.68 cm    MV stenosis pressure 1/2 time 27.07 ms    LV Diastolic Volume 247.88 mL    LV Systolic Volume 193.95 mL    LVOT peak zeferino 0.74 m/s    TDI LATERAL 0.09 m/s    TDI SEPTAL 0.04 m/s    LV LATERAL E/E' RATIO 13.00 m/s    LV SEPTAL E/E' RATIO 29.25 m/s    FS 13 %    LA volume 155.41 cm3    LV mass 293.32 g    ZLVIDD 1.87     ZLVIDS 4.27     Left Ventricle Relative Wall Thickness 0.34 cm    AV valve area 2.92 cm²    AV Velocity Ratio 0.91     AV index (prosthetic) 0.92     MV valve area p 1/2 method 8.13 cm2    E/A ratio 3.34     Mean e' 0.07 m/s    LVOT area 3.2 cm2    LVOT stroke volume 32.35 cm3    AV peak gradient 3 mmHg    E/E' ratio 18.00 m/s    LV Systolic Volume Index 96.5 mL/m2    LV Diastolic Volume Index 123.32 mL/m2    LA Volume Index 77.3 mL/m2    LV Mass Index 146 g/m2    ALLAN by Velocity Ratio 2.90 cm²    BSA 2 m2    Est. RA pres 8 mmHg    Mondragon's Biplane MOD Ejection Fraction 18 %    Narrative      Left Ventricle: The left ventricle is severely dilated. Moderately   increased ventricular mass. Normal wall thickness. There is moderate   eccentric hypertrophy. regional wall motion abnormalities present. There   is severely reduced systolic function with a visually estimated ejection   fraction of 15 - 20%. Biplane (2D) method of discs ejection fraction is   18%. There is diastolic dysfunction.    Left Atrium: Left atrium is severely dilated.    Right Ventricle: Normal right ventricular cavity size. Systolic   function is normal.    Aortic Valve: There is mild aortic valve sclerosis.    Mitral Valve: There is mild to moderate regurgitation.    IVC/SVC: Intermediate venous pressure at 8 mmHg.

## 2023-11-09 NOTE — Clinical Note
The PA catheter was repositioned to the main pulmonary artery. Hemodynamics were performed. O2 saturation was measured at 32%. CO=4.11  CI=2.06

## 2023-11-09 NOTE — ASSESSMENT & PLAN NOTE
Pt with known ICM with an EF of 25% on home  presenting with decompensated HF.  Not in cardiogenic shock  - admit to CCU team telemetry  - continue  gtt at 5  - IVP lasix 80mg x1 and start lasix gtt at 10mg/hr and monitor UOP  - BMP for this PM  - hold home GDMT: entresto 24/26mg BID  - hold home diuretics: torsemide 20mg BID and metolazone 2.5mg PRN

## 2023-11-09 NOTE — HPI
63 year old male with hx of CAD s/p 3v CABG (unclear anatomy, 2009), ICM with a recent EF of 15-20% s/p ICD (medmauricio 2009), DM2 (a1c 7.7), HTN, HLD, Vfib on amio who presents to the ED with CC of SOB    [place] [facility].  He was started on a Lasix gtt and did well.  Started on gDMT and discharged home on  5 with plans to follow up in HTS clinic for ongoing transplant evaluation at another facility.  He says that about a few days ago he started to notice LE swelling.  This turned into Nelson and orthopnea.  He says he can't walk to the bathroom without getting SOB.  He also complains of weight gain.  He takes torsemide 20mg BID at home and was told to trial additional Lasix which he did without any improvement.  He was rx metolazone but has not been filled.  He came to the ED    In the ED he was AF with stable VS on RA.  CBC showing chronic anemia.  CMP notable for acute on chronic CKD with baseline around 2.1 and Cr now 2.6.  BNP elevated.  Lactate neg.  CXR showing pulmonary edema.  I evaluated the pt at the bedside.  Bedside TTE showing CVP >15.  He was subsequently admitted to the CCU for diuresis.

## 2023-11-09 NOTE — Clinical Note
The PA catheter was repositioned to the main pulmonary artery and secured in place for continuous hemodynamic monitoring.

## 2023-11-09 NOTE — Clinical Note
7 ml of contrast were injected throughout the case. 93 mL of contrast was the total wasted during the case. 100 mL was the total amount used during the case.

## 2023-11-09 NOTE — H&P
[facility] - Emergency Dept  Cardiology  History and Physical     Patient Name: Radha Abbott  MRN: [id number]  Admission Date: 11/9/2023  Code Status: Full Code   Attending Provider: Heavenly Rajput MD   Primary Care Physician: Vasu Kong MD  Principal Problem:<principal problem not specified>    Patient information was obtained from patient and ER records.     Subjective:     Chief Complaint:  dHF     HPI:  63 year old male with hx of CAD s/p 3v CABG (unclear anatomy, 2009), ICM with a recent EF of 15-20% s/p ICD (medtronik 2009), DM2 (a1c 7.7), HTN, HLD, Vfib on amio who presents to the ED with CC of SOB    Pt was recently admitted to [facility] as a transfer.  He was started on a Lasix gtt and did well.  Started on gDMT and discharged home on  5 with plans to follow up in HTS clinic for ongoing transplant evaluation at another facility.  He says that about a few days ago he started to notice LE swelling.  This turned into Nelson and orthopnea.  He says he can't walk to the bathroom without getting SOB.  He also complains of weight gain.  He takes torsemide 20mg BID at home and was told to trial additional Lasix which he did without any improvement.  He was rx metolazone but has not been filled.  He came to the ED    In the ED he was AF with stable VS on RA.  CBC showing chronic anemia.  CMP notable for acute on chronic CKD with baseline around 2.1 and Cr now 2.6.  BNP elevated.  Lactate neg.  CXR showing pulmonary edema.  I evaluated the pt at the bedside.  Bedside TTE showing CVP >15.  He was subsequently admitted to the CCU for diuresis.      Past Medical History:   Diagnosis Date    CAD (coronary artery disease)     Diabetes mellitus     HFrEF (heart failure with reduced ejection fraction)     ICD (implantable cardioverter-defibrillator) in place     MI, old        Past Surgical History:   Procedure Laterality Date    CARDIAC SURGERY      RIGHT HEART CATHETERIZATION Right 10/10/2023    Procedure:  INSERTION, CATHETER, RIGHT HEART;  Surgeon: Bin Gandhi MD;  Location: [facility] CATH LAB;  Service: Cardiology;  Laterality: Right;    RIGHT HEART CATHETERIZATION Right 10/13/2023    Procedure: INSERTION, CATHETER, RIGHT HEART;  Surgeon: Walter Mcintyre MD;  Location: [facility] CATH LAB;  Service: Cardiology;  Laterality: Right;       Review of patient's allergies indicates:  No Known Allergies    No current facility-administered medications on file prior to encounter.     Current Outpatient Medications on File Prior to Encounter   Medication Sig    amiodarone (PACERONE) 400 MG tablet Take 1 tablet (400 mg total) by mouth once daily.    apixaban (ELIQUIS) 5 mg Tab Take 1 tablet (5 mg total) by mouth 2 (two) times daily.    aspirin (ECOTRIN) 81 MG EC tablet Take 81 mg by mouth once daily.    atorvastatin (LIPITOR) 40 MG tablet Take 40 mg by mouth.    DOBUTamine (DOBUTREX) 1,000 mg/250 mL (4,000 mcg/mL) infusion Inject 389.5 mcg/min into the vein continuous.    fish oil-omega-3 fatty acids 300-1,000 mg capsule Take 2 g by mouth once daily.    furosemide (LASIX) 40 MG tablet Take 1 tablet (40 mg total) by mouth 2 (two) times daily.    gabapentin (NEURONTIN) 300 MG capsule Take 300 mg by mouth nightly as needed.    multivitamin capsule Take 1 capsule by mouth once daily.    pantoprazole (PROTONIX) 40 MG tablet Take 1 tablet (40 mg total) by mouth before breakfast.    potassium chloride SA (K-DUR,KLOR-CON) 20 MEQ tablet Take 1 tablet (20 mEq total) by mouth once daily.    sacubitriL-valsartan (ENTRESTO) 24-26 mg per tablet Take 1 tablet by mouth 2 (two) times daily.    metOLazone (ZAROXOLYN) 2.5 MG tablet Take 1 tablet (2.5 mg total) by mouth once daily. Thursday 11/9 and Saturday 11/10    torsemide (DEMADEX) 20 MG Tab Take 2 tablets (40 mg total) by mouth 2 (two) times a day.     Family History    None       Tobacco Use    Smoking status: Every Day     Current packs/day: 0.50     Types: Cigarettes     Smokeless tobacco: Not on file   Substance and Sexual Activity    Alcohol use: Yes     Comment: rarely    Drug use: No    Sexual activity: Not on file     Review of Systems   Constitutional: Positive for weight gain. Negative for fever and malaise/fatigue.   HENT:  Negative for congestion and sore throat.    Eyes:  Negative for blurred vision, vision loss in left eye and vision loss in right eye.   Cardiovascular:  Positive for dyspnea on exertion, leg swelling and orthopnea. Negative for chest pain, irregular heartbeat, near-syncope, palpitations and syncope.   Respiratory:  Positive for shortness of breath. Negative for wheezing.    Endocrine: Negative.    Hematologic/Lymphatic: Negative.    Skin: Negative.    Musculoskeletal:  Negative for arthritis, back pain, joint pain and myalgias.   Gastrointestinal:  Negative for abdominal pain, hematemesis, hematochezia and melena.   Genitourinary: Negative.    Neurological:  Negative for light-headedness and loss of balance.   Psychiatric/Behavioral: Negative.       Objective:     Vital Signs (Most Recent):  Temp: 98 °F (36.7 °C) (11/09/23 0618)  Pulse: 80 (11/09/23 0741)  Resp: 20 (11/09/23 0741)  BP: 106/68 (11/09/23 0741)  SpO2: 99 % (11/09/23 0741) Vital Signs (24h Range):  Temp:  [98 °F (36.7 °C)] 98 °F (36.7 °C)  Pulse:  [79-82] 80  Resp:  [15-21] 20  SpO2:  [99 %-100 %] 99 %  BP: ()/(53-69) 106/68     Weight: 78.5 kg (173 lb)  Body mass index is 23.46 kg/m².    SpO2: 99 %       No intake or output data in the 24 hours ending 11/09/23 0925    Lines/Drains/Airways       Peripherally Inserted Central Catheter Line  Duration             PICC Double Lumen 10/13/23 1542 right basilic 26 days              Peripheral Intravenous Line  Duration                  Peripheral IV - Single Lumen 11/09/23 0656 20 G Left Antecubital <1 day                     Physical Exam  Vitals and nursing note reviewed.   Constitutional:       Appearance: Normal appearance. He is  normal weight. He is not toxic-appearing.   HENT:      Head: Normocephalic and atraumatic.   Eyes:      General: No scleral icterus.     Extraocular Movements: Extraocular movements intact.   Neck:      Vascular: No carotid bruit.      Comments: Unable to appreciate any JVD  Cardiovascular:      Rate and Rhythm: Normal rate and regular rhythm.      Pulses: Normal pulses.      Heart sounds: Murmur heard.      No gallop.   Pulmonary:      Comments: Crackles bilaterally throughout all lung fields  Abdominal:      General: Abdomen is flat. Bowel sounds are normal.      Palpations: Abdomen is soft. There is no mass.   Musculoskeletal:      Cervical back: Normal range of motion.      Right lower leg: Edema present.      Left lower leg: Edema present.   Skin:     General: Skin is warm and dry.      Capillary Refill: Capillary refill takes less than 2 seconds.      Findings: No rash.   Neurological:      General: No focal deficit present.      Mental Status: He is alert and oriented to person, place, and time. Mental status is at baseline.          Significant Labs: All pertinent lab results from the last 24 hours have been reviewed.    Significant Imaging: Echocardiogram: Transthoracic echo (TTE) complete (Cupid Only):   Results for orders placed or performed during the hospital encounter of 10/19/23   Echo   Result Value Ref Range    Ascending aorta 3.27 cm    STJ 3.03 cm    AV mean gradient 2 mmHg    Ao peak zeferino 0.81 m/s    Ao VTI 11.08 cm    IVS 0.62 0.6 - 1.1 cm    LA size 4.63 cm    Left Atrium Major Axis 6.73 cm    Left Atrium Minor Axis 7.19 cm    LVIDd 7.1 (A) 3.5 - 6.0 cm    LVIDs 6.20 (A) 2.1 - 4.0 cm    LVOT diameter 2.01 cm    LVOT peak VTI 10.20 cm    Posterior Wall 1.20 (A) 0.6 - 1.1 cm    MV Peak A Zeferino 0.35 m/s    E wave deceleration time 93.35 msec    MV Peak E Zeferino 1.17 m/s    RA Major Axis 5.17 cm    RA Width 3.59 cm    RVDD 3.30 cm    Sinus 3.20 cm    TAPSE 1.87 cm    LA WIDTH 5.68 cm    MV stenosis  pressure 1/2 time 27.07 ms    LV Diastolic Volume 247.88 mL    LV Systolic Volume 193.95 mL    LVOT peak gisselle 0.74 m/s    TDI LATERAL 0.09 m/s    TDI SEPTAL 0.04 m/s    LV LATERAL E/E' RATIO 13.00 m/s    LV SEPTAL E/E' RATIO 29.25 m/s    FS 13 %    LA volume 155.41 cm3    LV mass 293.32 g    ZLVIDD 1.87     ZLVIDS 4.27     Left Ventricle Relative Wall Thickness 0.34 cm    AV valve area 2.92 cm²    AV Velocity Ratio 0.91     AV index (prosthetic) 0.92     MV valve area p 1/2 method 8.13 cm2    E/A ratio 3.34     Mean e' 0.07 m/s    LVOT area 3.2 cm2    LVOT stroke volume 32.35 cm3    AV peak gradient 3 mmHg    E/E' ratio 18.00 m/s    LV Systolic Volume Index 96.5 mL/m2    LV Diastolic Volume Index 123.32 mL/m2    LA Volume Index 77.3 mL/m2    LV Mass Index 146 g/m2    ALLAN by Velocity Ratio 2.90 cm²    BSA 2 m2    Est. RA pres 8 mmHg    Mondragon's Biplane MOD Ejection Fraction 18 %    Narrative      Left Ventricle: The left ventricle is severely dilated. Moderately   increased ventricular mass. Normal wall thickness. There is moderate   eccentric hypertrophy. regional wall motion abnormalities present. There   is severely reduced systolic function with a visually estimated ejection   fraction of 15 - 20%. Biplane (2D) method of discs ejection fraction is   18%. There is diastolic dysfunction.    Left Atrium: Left atrium is severely dilated.    Right Ventricle: Normal right ventricular cavity size. Systolic   function is normal.    Aortic Valve: There is mild aortic valve sclerosis.    Mitral Valve: There is mild to moderate regurgitation.    IVC/SVC: Intermediate venous pressure at 8 mmHg.       Assessment and Plan:     Acute on chronic combined systolic and diastolic CHF, NYHA class 4  Pt with known ICM with an EF of 25% on home  presenting with decompensated HF.  Not in cardiogenic shock  - admit to CCU team telemetry  - continue  gtt at 5  - IVP lasix 80mg x1 and start lasix gtt at 10mg/hr and monitor  UOP  - BMP for this PM  - hold home GDMT: entresto 24/26mg BID  - hold home diuretics: torsemide 20mg BID and metolazone 2.5mg PRN    PAF (paroxysmal atrial fibrillation)  Pt with known pAF currently in NSR.  CV4  - continue home amiodarone 400mg qD  - AC with eliquis 5mg BID  - continue home protonix 40mg qD    IVÁN (acute kidney injury)  Pt with known CKD2b presenting with acute on chronic kidney disease.  Baseline Cr around 2.1  - diurese  - hold home entresto    Type 2 diabetes mellitus with diabetic polyneuropathy, without long-term current use of insulin  Last a1c 7.6.  Not on insulin at home  - will start [place] SSI  - CCU team to consider endocrine consult    CAD (coronary artery disease)  S/p 3v CABG in 2009 (unclear anatomy)  - continue home ASA 81mg qD and atrovastatin 40mg qD    Ventricular tachycardia  Has known h/o VT s/p ICD placement (medmauricio 2009)  - continue home amiodarone 400mg qD        VTE Risk Mitigation (From admission, onward)         Ordered     apixaban tablet 5 mg  2 times daily         11/09/23 0982                Chente Ortega MD  Cardiology   [facility] - Emergency Dept

## 2023-11-09 NOTE — Clinical Note
The left chest and left neck was prepped. The site was prepped with ChloraPrep. The patient was draped.

## 2023-11-10 ENCOUNTER — TELEPHONE (OUTPATIENT)
Dept: TRANSPLANT | Facility: CLINIC | Age: 63
End: 2023-11-10
Payer: MEDICAID

## 2023-11-10 LAB
ABO + RH BLD: NORMAL
ALBUMIN SERPL BCP-MCNC: 3.8 G/DL (ref 3.5–5.2)
ALLENS TEST: ABNORMAL
ALLENS TEST: ABNORMAL
ALLENS TEST: NORMAL
ALP SERPL-CCNC: 89 U/L (ref 55–135)
ALT SERPL W/O P-5'-P-CCNC: 37 U/L (ref 10–44)
ANION GAP SERPL CALC-SCNC: 12 MMOL/L (ref 8–16)
ANION GAP SERPL CALC-SCNC: 14 MMOL/L (ref 8–16)
ANION GAP SERPL CALC-SCNC: 15 MMOL/L (ref 8–16)
AST SERPL-CCNC: 19 U/L (ref 10–40)
BASOPHILS # BLD AUTO: 0.03 K/UL (ref 0–0.2)
BASOPHILS NFR BLD: 0.8 % (ref 0–1.9)
BILIRUB SERPL-MCNC: 1 MG/DL (ref 0.1–1)
BLD GP AB SCN CELLS X3 SERPL QL: NORMAL
BUN SERPL-MCNC: 57 MG/DL (ref 8–23)
BUN SERPL-MCNC: 57 MG/DL (ref 8–23)
BUN SERPL-MCNC: 59 MG/DL (ref 8–23)
CALCIUM SERPL-MCNC: 8.8 MG/DL (ref 8.7–10.5)
CALCIUM SERPL-MCNC: 9.2 MG/DL (ref 8.7–10.5)
CALCIUM SERPL-MCNC: 9.2 MG/DL (ref 8.7–10.5)
CHLORIDE SERPL-SCNC: 103 MMOL/L (ref 95–110)
CHLORIDE SERPL-SCNC: 103 MMOL/L (ref 95–110)
CHLORIDE SERPL-SCNC: 104 MMOL/L (ref 95–110)
CO2 SERPL-SCNC: 20 MMOL/L (ref 23–29)
CO2 SERPL-SCNC: 21 MMOL/L (ref 23–29)
CO2 SERPL-SCNC: 21 MMOL/L (ref 23–29)
CREAT SERPL-MCNC: 2.1 MG/DL (ref 0.5–1.4)
CREAT SERPL-MCNC: 2.3 MG/DL (ref 0.5–1.4)
CREAT SERPL-MCNC: 2.5 MG/DL (ref 0.5–1.4)
DELSYS: ABNORMAL
DELSYS: ABNORMAL
DELSYS: NORMAL
DIFFERENTIAL METHOD BLD: ABNORMAL
EOSINOPHIL # BLD AUTO: 0.1 K/UL (ref 0–0.5)
EOSINOPHIL NFR BLD: 1.8 % (ref 0–8)
ERYTHROCYTE [DISTWIDTH] IN BLOOD BY AUTOMATED COUNT: 14.6 % (ref 11.5–14.5)
EST. GFR  (NO RACE VARIABLE): 28.5 ML/MIN/1.73 M^2
EST. GFR  (NO RACE VARIABLE): 31.5 ML/MIN/1.73 M^2
EST. GFR  (NO RACE VARIABLE): 35.2 ML/MIN/1.73 M^2
GLUCOSE SERPL-MCNC: 116 MG/DL (ref 70–110)
GLUCOSE SERPL-MCNC: 167 MG/DL (ref 70–110)
GLUCOSE SERPL-MCNC: 194 MG/DL (ref 70–110)
HCT VFR BLD AUTO: 23.4 % (ref 40–54)
HGB BLD-MCNC: 8.1 G/DL (ref 14–18)
IMM GRANULOCYTES # BLD AUTO: 0.01 K/UL (ref 0–0.04)
IMM GRANULOCYTES NFR BLD AUTO: 0.3 % (ref 0–0.5)
LDH SERPL L TO P-CCNC: 1.6 MMOL/L (ref 0.5–2.2)
LDH SERPL L TO P-CCNC: 294 U/L (ref 110–260)
LYMPHOCYTES # BLD AUTO: 0.7 K/UL (ref 1–4.8)
LYMPHOCYTES NFR BLD: 17.6 % (ref 18–48)
MAGNESIUM SERPL-MCNC: 2.2 MG/DL (ref 1.6–2.6)
MCH RBC QN AUTO: 30.7 PG (ref 27–31)
MCHC RBC AUTO-ENTMCNC: 34.6 G/DL (ref 32–36)
MCV RBC AUTO: 89 FL (ref 82–98)
MODE: ABNORMAL
MODE: ABNORMAL
MODE: NORMAL
MONOCYTES # BLD AUTO: 0.2 K/UL (ref 0.3–1)
MONOCYTES NFR BLD: 6.1 % (ref 4–15)
NEUTROPHILS # BLD AUTO: 2.9 K/UL (ref 1.8–7.7)
NEUTROPHILS NFR BLD: 73.4 % (ref 38–73)
NRBC BLD-RTO: 0 /100 WBC
PHOSPHATE SERPL-MCNC: 3.5 MG/DL (ref 2.7–4.5)
PLATELET # BLD AUTO: 228 K/UL (ref 150–450)
PMV BLD AUTO: 10.8 FL (ref 9.2–12.9)
PO2 BLDA: 24 MMHG (ref 40–60)
PO2 BLDA: 25 MMHG (ref 40–60)
POC SATURATED O2: 41 % (ref 95–100)
POC SATURATED O2: 47 % (ref 95–100)
POCT GLUCOSE: 130 MG/DL (ref 70–110)
POCT GLUCOSE: 191 MG/DL (ref 70–110)
POCT GLUCOSE: 282 MG/DL (ref 70–110)
POTASSIUM SERPL-SCNC: 4.1 MMOL/L (ref 3.5–5.1)
POTASSIUM SERPL-SCNC: 4.2 MMOL/L (ref 3.5–5.1)
POTASSIUM SERPL-SCNC: 4.8 MMOL/L (ref 3.5–5.1)
PROT SERPL-MCNC: 7.3 G/DL (ref 6–8.4)
RBC # BLD AUTO: 2.64 M/UL (ref 4.6–6.2)
SAMPLE: ABNORMAL
SAMPLE: ABNORMAL
SAMPLE: NORMAL
SITE: ABNORMAL
SITE: ABNORMAL
SITE: NORMAL
SODIUM SERPL-SCNC: 136 MMOL/L (ref 136–145)
SODIUM SERPL-SCNC: 138 MMOL/L (ref 136–145)
SODIUM SERPL-SCNC: 139 MMOL/L (ref 136–145)
SPECIMEN OUTDATE: NORMAL
WBC # BLD AUTO: 3.93 K/UL (ref 3.9–12.7)

## 2023-11-10 PROCEDURE — 63600175 PHARM REV CODE 636 W HCPCS: Mod: NTX | Performed by: STUDENT IN AN ORGANIZED HEALTH CARE EDUCATION/TRAINING PROGRAM

## 2023-11-10 PROCEDURE — 86901 BLOOD TYPING SEROLOGIC RH(D): CPT | Mod: NTX | Performed by: PHYSICIAN ASSISTANT

## 2023-11-10 PROCEDURE — 83605 ASSAY OF LACTIC ACID: CPT | Mod: NTX

## 2023-11-10 PROCEDURE — 82803 BLOOD GASES ANY COMBINATION: CPT | Mod: NTX

## 2023-11-10 PROCEDURE — 25000003 PHARM REV CODE 250: Mod: NTX | Performed by: STUDENT IN AN ORGANIZED HEALTH CARE EDUCATION/TRAINING PROGRAM

## 2023-11-10 PROCEDURE — 36415 COLL VENOUS BLD VENIPUNCTURE: CPT | Mod: NTX | Performed by: INTERNAL MEDICINE

## 2023-11-10 PROCEDURE — 84100 ASSAY OF PHOSPHORUS: CPT | Mod: NTX | Performed by: STUDENT IN AN ORGANIZED HEALTH CARE EDUCATION/TRAINING PROGRAM

## 2023-11-10 PROCEDURE — 20600001 HC STEP DOWN PRIVATE ROOM: Mod: NTX

## 2023-11-10 PROCEDURE — 80048 BASIC METABOLIC PNL TOTAL CA: CPT | Mod: 91,NTX | Performed by: STUDENT IN AN ORGANIZED HEALTH CARE EDUCATION/TRAINING PROGRAM

## 2023-11-10 PROCEDURE — 99900035 HC TECH TIME PER 15 MIN (STAT): Mod: NTX

## 2023-11-10 PROCEDURE — 80053 COMPREHEN METABOLIC PANEL: CPT | Mod: NTX | Performed by: INTERNAL MEDICINE

## 2023-11-10 PROCEDURE — 85025 COMPLETE CBC W/AUTO DIFF WBC: CPT | Mod: NTX | Performed by: STUDENT IN AN ORGANIZED HEALTH CARE EDUCATION/TRAINING PROGRAM

## 2023-11-10 PROCEDURE — 3E04317 INTRODUCTION OF OTHER THROMBOLYTIC INTO CENTRAL VEIN, PERCUTANEOUS APPROACH: ICD-10-PCS | Performed by: INTERNAL MEDICINE

## 2023-11-10 PROCEDURE — 93010 ELECTROCARDIOGRAM REPORT: CPT | Mod: NTX,,, | Performed by: INTERNAL MEDICINE

## 2023-11-10 PROCEDURE — 99222 1ST HOSP IP/OBS MODERATE 55: CPT | Mod: NTX,,, | Performed by: NURSE PRACTITIONER

## 2023-11-10 PROCEDURE — 99223 1ST HOSP IP/OBS HIGH 75: CPT | Mod: ,,, | Performed by: INTERNAL MEDICINE

## 2023-11-10 PROCEDURE — 83735 ASSAY OF MAGNESIUM: CPT | Mod: NTX | Performed by: STUDENT IN AN ORGANIZED HEALTH CARE EDUCATION/TRAINING PROGRAM

## 2023-11-10 PROCEDURE — 83615 LACTATE (LD) (LDH) ENZYME: CPT | Mod: NTX | Performed by: INTERNAL MEDICINE

## 2023-11-10 PROCEDURE — 63600175 PHARM REV CODE 636 W HCPCS: Mod: NTX | Performed by: PHYSICIAN ASSISTANT

## 2023-11-10 PROCEDURE — 25000003 PHARM REV CODE 250: Mod: NTX | Performed by: PHYSICIAN ASSISTANT

## 2023-11-10 PROCEDURE — 36415 COLL VENOUS BLD VENIPUNCTURE: CPT | Mod: NTX | Performed by: STUDENT IN AN ORGANIZED HEALTH CARE EDUCATION/TRAINING PROGRAM

## 2023-11-10 PROCEDURE — 80048 BASIC METABOLIC PNL TOTAL CA: CPT | Mod: NTX | Performed by: STUDENT IN AN ORGANIZED HEALTH CARE EDUCATION/TRAINING PROGRAM

## 2023-11-10 PROCEDURE — 93005 ELECTROCARDIOGRAM TRACING: CPT | Mod: NTX

## 2023-11-10 RX ORDER — POTASSIUM CHLORIDE 20 MEQ/1
40 TABLET, EXTENDED RELEASE ORAL 2 TIMES DAILY
Status: DISCONTINUED | OUTPATIENT
Start: 2023-11-10 | End: 2023-11-14

## 2023-11-10 RX ORDER — INSULIN ASPART 100 [IU]/ML
4 INJECTION, SOLUTION INTRAVENOUS; SUBCUTANEOUS
Status: DISCONTINUED | OUTPATIENT
Start: 2023-11-10 | End: 2023-11-12

## 2023-11-10 RX ORDER — FUROSEMIDE 10 MG/ML
80 INJECTION INTRAMUSCULAR; INTRAVENOUS ONCE
Status: COMPLETED | OUTPATIENT
Start: 2023-11-10 | End: 2023-11-10

## 2023-11-10 RX ORDER — ACETAMINOPHEN 325 MG/1
650 TABLET ORAL EVERY 6 HOURS PRN
Status: DISCONTINUED | OUTPATIENT
Start: 2023-11-10 | End: 2023-11-16

## 2023-11-10 RX ORDER — INSULIN ASPART 100 [IU]/ML
0-5 INJECTION, SOLUTION INTRAVENOUS; SUBCUTANEOUS
Status: DISCONTINUED | OUTPATIENT
Start: 2023-11-10 | End: 2023-11-20

## 2023-11-10 RX ORDER — LANOLIN ALCOHOL/MO/W.PET/CERES
400 CREAM (GRAM) TOPICAL DAILY
Status: DISCONTINUED | OUTPATIENT
Start: 2023-11-10 | End: 2023-12-02

## 2023-11-10 RX ADMIN — INSULIN ASPART 6 UNITS: 100 INJECTION, SOLUTION INTRAVENOUS; SUBCUTANEOUS at 01:11

## 2023-11-10 RX ADMIN — APIXABAN 5 MG: 5 TABLET, FILM COATED ORAL at 08:11

## 2023-11-10 RX ADMIN — ATORVASTATIN CALCIUM 40 MG: 40 TABLET, FILM COATED ORAL at 08:11

## 2023-11-10 RX ADMIN — THERA TABS 1 TABLET: TAB at 09:11

## 2023-11-10 RX ADMIN — POTASSIUM CHLORIDE 40 MEQ: 1500 TABLET, EXTENDED RELEASE ORAL at 09:11

## 2023-11-10 RX ADMIN — AMIODARONE HYDROCHLORIDE 400 MG: 200 TABLET ORAL at 08:11

## 2023-11-10 RX ADMIN — PANTOPRAZOLE SODIUM 40 MG: 40 TABLET, DELAYED RELEASE ORAL at 05:11

## 2023-11-10 RX ADMIN — ALTEPLASE 2 MG: 2.2 INJECTION, POWDER, LYOPHILIZED, FOR SOLUTION INTRAVENOUS at 06:11

## 2023-11-10 RX ADMIN — Medication 400 MG: at 01:11

## 2023-11-10 RX ADMIN — ACETAMINOPHEN 650 MG: 325 TABLET ORAL at 09:11

## 2023-11-10 RX ADMIN — POTASSIUM CHLORIDE 40 MEQ: 1500 TABLET, EXTENDED RELEASE ORAL at 01:11

## 2023-11-10 RX ADMIN — FUROSEMIDE 80 MG: 10 INJECTION, SOLUTION INTRAVENOUS at 06:11

## 2023-11-10 RX ADMIN — INSULIN ASPART 2 UNITS: 100 INJECTION, SOLUTION INTRAVENOUS; SUBCUTANEOUS at 08:11

## 2023-11-10 RX ADMIN — FUROSEMIDE 10 MG/HR: 10 INJECTION, SOLUTION INTRAMUSCULAR; INTRAVENOUS at 10:11

## 2023-11-10 RX ADMIN — FUROSEMIDE 80 MG: 10 INJECTION, SOLUTION INTRAVENOUS at 01:11

## 2023-11-10 RX ADMIN — ASPIRIN 81 MG: 81 TABLET, COATED ORAL at 08:11

## 2023-11-10 NOTE — ASSESSMENT & PLAN NOTE
Known hx of pAF. In sinus rhythm on admission, but 1 run of AF RVR overnight. He spontaneously converted.  - Continue home amiodarone 400mg qd  - Continue home Eliquis  - Continue home protonix

## 2023-11-10 NOTE — PROGRESS NOTES
"Admit Note     Transplant  spoke with the pt's spouse by phone in order to complete assessment.  Pt in room with spouse, but wanted worker to ask spouse the admit questions.  Pt/spouse are familiar with the role of the Transplant  from last hospital visit.       Patient is a 63 y.o.  male, admitted per medical record due to acute on chronic combined systolic and diastolic CHF, paroxysmal atrial fibrillation, acute kidney injury, type 2 diabetes, ventricular tachycardia and coronary artery disease. Pt is on home  with HH.     Patient admitted to [facility] on 11/9/2023 .  At this time, patient presents per spouse as alert and oriented x 4 and calm.  At this time, patients caregiver presents as alert and oriented x 4, calm, communicative, and asking and answering questions appropriately.      Household/Family Systems (as reported by patients caregiver)     Patient resides with patient's spouse, [place] [id number] [facility] [id number].      Support system includes spouse, sister and adult children.    Patient does not have dependents that are need of being cared for.  The pt has four adult children (3 daughters and one son) of his own. Pt and spouse do not have any children together.      Patients primary caregiver is self and spouse.    Pt's cell:  [phone]    Emergency contacts:   Arlyn Abbott (spouse, works full time and drives) [phone]  Teetee Abbott (sister) [phone]      During admission, patient's caregiver plans to stay in patient's room.      Confirmed patient and patients caregivers do have access to reliable transportation.    Cognitive Status/Learning     Patients caregiver reports patients reading ability as 12th grade and states patient does not have difficulty with reading, writing, comprehension, learning, and memory.    Spouse reports pt has difficulty with eyesight due to diabetes and "selective" hearing.     Patients caregiver reports patient learns " best by one on one support.     Needed: No.   Highest education level: High School (9-12) or GED    Vocation/Disability (as reported by patients caregiver)    Working for Income: No  If no, reason not working: Disability  Patient/spouse report disability started in 2009 and he receives $500 a month.  It's unclear at this time why the pt has not qualified for Medicare. Family has been encouraged to contact the Social Security Office.   Spouse is a CNA and reports she is eligible for 12 months of FMLA. Spouse also reports she has EOB time (with pay).   Spouse did not report any financial hardships at this time.     Adherence     Patients caregiver reports patient has a high level of adherence to patients health care regimen. Spouse reports she assists pt with medication management.    Adherence counseling and education provided.  Patient's caregiver verbalizes understanding.    Substance Use    Patients caregiver reports patients substance usage as the following:    Tobacco: Spouse reports no tobacco use since 10/7/2023.  Pt was smoking .5ppd.   No other tobacco products reported.   Pt and spouse are aware pt must have no tobacco for six months to qualify for transplant.   Alcohol: per spouse, no current alcohol use  Illicit Drugs/Non-prescribed Medications: none per spouse and pt on last admit..  Patients caregiver states clear understanding of the potential impact of substance use.  Substance abstinence/cessation counseling, education and resources provided and reviewed.     Services Utilizing/ADLS (as reported by patients caregiver)    Infusion Service: Prior to admission, patient utilizing? yes [facility] for  [phone]    Home Health: Prior to admission, patient utilizing? yes [facility] for Labs and PICC line care [phone]    Pt and spouse would like to use the same IV and HH companies when discharged.     DME: Prior to admission, no    Pulmonary/Cardiac Rehab: Prior to  admission, no    Dialysis:  Prior to admission, no    Transplant Specialty Pharmacy:  Prior to admission, no.    Prior to admission, patients caregiver reports patient was mostly  independent with ADLS.  Spouse assisted when needed.  Limited driving at this time.     Patients caregiver reports patient is not able to care for self at this time due to compromised medical condition (as documented in medical record) and physical weakness..  Patients caregiver reports patient indicates a willingness to care for self once medically cleared to do so.    Insurance/Medications    Insured by   Payer/Plan Subscr  Sex Relation Sub. Ins. ID Effective Group Num   1. [employer] BU[employer] 1960 Male Self [id number] 10/31/23                                    [address] [address]   2. [employer] SMO[employer]ISON BUCK 1960 Male Self [id number] 10/9/23                                    PO       Primary Insurance (for UNOS reporting): Public Insurance - Medicaid  Secondary Insurance (for UNOS reporting): None    Patients caregiver reports patient is able to obtain and afford medications at this time and at time of discharge.    Living Will/Healthcare Power of     Patients caregiver reports patient does not have a LW and/or HCPA.   provided education regarding LW and HCPA and the completion of forms.    Coping/Mental Health (as reported by patients caregiver)    Spouse reports the pt is not experiencing any mental health challenges at this time. Spouse reports the pt has good family support and a strong gloria.      Spouse reports pt will be evaluated for LVAD and Transplant.     Worker explained the role of the caregiver for LVAD and Transplant and need to complete a psychosocial with caregivers.   Worker answered questions and provided encouragement.         Discharge Planning (as reported by patients caregiver)    At time of discharge, patient plans to return to patient's home under the care  of self and spouse.  Patients spouse will transport patient (car on campus)  Per rounds today, expected discharge date has not been medically determined at this time. Patients caretaker verbalizes understanding and is involved in treatment planning and discharge process.    Additional Concerns    Patient is being followed for needs, education, resources, information, emotional support, supportive counseling, and for supportive and skilled discharge plan of care.  providing ongoing psychosocial support, education, resources and d/c planning as needed.  SW remains available. Patient's caregiver verbalizes understanding and agreement with information reviewed,  availability and how to access available resources as needed.

## 2023-11-10 NOTE — ASSESSMENT & PLAN NOTE
BG goal 140-180    Start Novolog 4 units TID with meals (0.3 u/kg dosing)   Low Dose Correction Scale (given kidney function)  BG monitoring ac/hs    ** Please call Endocrine for any BG related issues **    Discharge plans: TBD    Lab Results   Component Value Date    HGBA1C 7.6 (H) 10/12/2023

## 2023-11-10 NOTE — SUBJECTIVE & OBJECTIVE
Interval HPI:   Overnight events: Remains in CSU. BG above goal ranges on prn SQ insulin correction scale. Creatinine 2.1. Diet Cardiac    Eatin%  Nausea: No  Hypoglycemia and intervention: No  Fever: No  TPN and/or TF: No  If yes, type of TF/TPN and rate: n/a    PMH, PSH, FH, SH reviewed     ROS:  Constitutional: Negative for weight changes.  Eyes: Negative for visual disturbance.  Respiratory: Positive for SOB.   Cardiovascular: Negative for chest pain.  Gastrointestinal: Negative for nausea.  Endocrine: Negative for polyuria, polydipsia.  Musculoskeletal: Negative for back pain.  Skin: Negative for rash.  Neurological: Negative for syncope.  Psychiatric/Behavioral: Negative for depression.    Review of Systems    Current Medications and/or Treatments Impacting Glycemic Control  Immunotherapy:    Immunosuppressants       None          Steroids:   Hormones (From admission, onward)      None          Pressors:    Autonomic Drugs (From admission, onward)      None          Hyperglycemia/Diabetes Medications:   Antihyperglycemics (From admission, onward)      Start     Stop Route Frequency Ordered    23 1031  insulin aspart U-100 pen 0-10 Units         -- SubQ Before meals & nightly PRN 23 0931             PHYSICAL EXAMINATION:  Vitals:    11/10/23 1203   BP: 105/61   Pulse: 93   Resp: 20   Temp: 98.2 °F (36.8 °C)     Body mass index is 23.98 kg/m².     Physical Exam  Constitutional: Well developed, well nourished, NAD.  ENT: External ears no masses with nose patent; normal hearing.  Neck: Supple; trachea midline.  Cardiovascular: Normal heart rate and regular rhtyhm  Lungs: Normal effort; lungs anterior bilaterally clear to auscultation.  Abdomen: Soft, no masses, no hernias.  MS: No clubbing or cyanosis of nails noted; unable to assess gait.  Skin: No rashes, lesions, or ulcers; no nodules.   Psychiatric: Good judgement and insight; normal mood and affect.  Neurological: Cranial nerves are  grossly intact.   Foot: Nails in good condition, no amputations noted.

## 2023-11-10 NOTE — PLAN OF CARE
Patient to admit to [facility] for transplant evaluation. Full H&P to follow.    Connor Gillies, DO, PGY-IV  [facility]

## 2023-11-10 NOTE — ASSESSMENT & PLAN NOTE
Lab Results   Component Value Date    CREATININE 2.1 (H) 11/10/2023     Avoid insulin stacking  Titrate insulin slowly

## 2023-11-10 NOTE — ASSESSMENT & PLAN NOTE
Pt with known ICM with an EF of 25% on home  presenting with decompensated HF.  Not in cardiogenic shock  - Home  gtt at 5  - Continue lasix gtt, 10/hr  - hold home GDMT: entresto 24/26mg BID  - hold home diuretics: torsemide 20mg BID and metolazone 2.5mg PRN  - will initiate TXP evaluation

## 2023-11-10 NOTE — CONSULTS
[facility] - Cardiology Stepdown  Endocrinology  Diabetes Consult Note    Consult Requested by: Juventino Bermudez Jr.*   Reason for admit: Acute on chronic combined systolic and diastolic CHF, NYHA class 4    HISTORY OF PRESENT ILLNESS:  Reason for Consult: Management of T2DM, Hyperglycemia     Surgical Procedure and Date: n/a    Diabetes diagnosis year:     Home Diabetes Medications:  Metformin (off since October)   Lab Results   Component Value Date    HGBA1C 7.6 (H) 10/12/2023       How often checking glucose at home?  Once daily in the AM    BG readings on regimen: 150-160s  Hypoglycemia on the regimen?  No  Missed doses on regimen?  n/a    Diabetes Complications include:     Hyperglycemia    Complicating diabetes co morbidities:   CAD s/p CABG, HTN, HLD      HPI:   Patient is a 63 y.o. male with a diagnosis of CAD s/p 3v CABG (unclear anatomy, ), ICM with a recent EF of 15-20% s/p ICD (medtronik ), DM2 (a1c 7.7), HTN, HLD, Vfib on amio who presents to the ED with CC of SOB. In the ED he was AF with stable VS on RA.  CBC showing chronic anemia.  CMP notable for acute on chronic CKD with baseline around 2.1 and Cr now 2.6.  BNP elevated.  Lactate neg.  CXR showing pulmonary edema. He was subsequently admitted to the CCU for diuresis.  Endocrinology consulted for management of T2DM.            Interval HPI:   Overnight events: Remains in CSU. BG above goal ranges on prn SQ insulin correction scale. Creatinine 2.1. Diet Cardiac    Eatin%  Nausea: No  Hypoglycemia and intervention: No  Fever: No  TPN and/or TF: No  If yes, type of TF/TPN and rate: n/a    PMH, PSH, FH, SH reviewed     ROS:  Constitutional: Negative for weight changes.  Eyes: Negative for visual disturbance.  Respiratory: Positive for SOB.   Cardiovascular: Negative for chest pain.  Gastrointestinal: Negative for nausea.  Endocrine: Negative for polyuria, polydipsia.  Musculoskeletal: Negative for back pain.  Skin: Negative for  "rash.  Neurological: Negative for syncope.  Psychiatric/Behavioral: Negative for depression.    Review of Systems    Current Medications and/or Treatments Impacting Glycemic Control  Immunotherapy:    Immunosuppressants       None          Steroids:   Hormones (From admission, onward)      None          Pressors:    Autonomic Drugs (From admission, onward)      None          Hyperglycemia/Diabetes Medications:   Antihyperglycemics (From admission, onward)      Start     Stop Route Frequency Ordered    11/09/23 1031  insulin aspart U-100 pen 0-10 Units         -- SubQ Before meals & nightly PRN 11/09/23 0931             PHYSICAL EXAMINATION:  Vitals:    11/10/23 1203   BP: 105/61   Pulse: 93   Resp: 20   Temp: 98.2 °F (36.8 °C)     Body mass index is 23.98 kg/m².     Physical Exam  Constitutional: Well developed, well nourished, NAD.  ENT: External ears no masses with nose patent; normal hearing.  Neck: Supple; trachea midline.  Cardiovascular: Normal heart rate and regular rhtyhm  Lungs: Normal effort; lungs anterior bilaterally clear to auscultation.  Abdomen: Soft, no masses, no hernias.  MS: No clubbing or cyanosis of nails noted; unable to assess gait.  Skin: No rashes, lesions, or ulcers; no nodules.   Psychiatric: Good judgement and insight; normal mood and affect.  Neurological: Cranial nerves are grossly intact.   Foot: Nails in good condition, no amputations noted.           Labs Reviewed and Include   Recent Labs   Lab 11/10/23  0435   *   CALCIUM 8.8      K 4.1   CO2 20*      BUN 57*   CREATININE 2.1*     Lab Results   Component Value Date    WBC 3.93 11/10/2023    HGB 8.1 (L) 11/10/2023    HCT 23.4 (L) 11/10/2023    MCV 89 11/10/2023     11/10/2023     No results for input(s): "TSH", "FREET4" in the last 168 hours.  Lab Results   Component Value Date    HGBA1C 7.6 (H) 10/12/2023       Nutritional status:   Body mass index is 23.98 kg/m².  Lab Results   Component Value Date    " "ALBUMIN 3.7 11/09/2023    ALBUMIN 3.1 (L) 11/01/2023    ALBUMIN 3.2 (L) 10/31/2023     No results found for: "PREALBUMIN"    Estimated Creatinine Clearance: 40.5 mL/min (A) (based on SCr of 2.1 mg/dL (H)).    Accu-Checks  Recent Labs     11/09/23  1212 11/10/23  0728 11/10/23  1308   POCTGLUCOSE 189* 191* 282*        ASSESSMENT and PLAN    Cardiac/Vascular  * Acute on chronic combined systolic and diastolic CHF, NYHA class 4  Managed per primary team  Optimize BG control      PAF (paroxysmal atrial fibrillation)  May increase insulin resistance.         Renal/  IVÁN (acute kidney injury)  Lab Results   Component Value Date    CREATININE 2.1 (H) 11/10/2023     Avoid insulin stacking  Titrate insulin slowly       Endocrine  Type 2 diabetes mellitus with diabetic polyneuropathy, without long-term current use of insulin  BG goal 140-180    Start Novolog 4 units TID with meals (0.3 u/kg dosing)   Low Dose Correction Scale (given kidney function)  BG monitoring ac/hs    ** Please call Endocrine for any BG related issues **    Discharge plans: TBD    Lab Results   Component Value Date    HGBA1C 7.6 (H) 10/12/2023               Plan discussed with patient at bedside.     Morena Escobar NP  Endocrinology  [facility] - [facility] Step[facility]  "

## 2023-11-10 NOTE — ED NOTES
Received report from KYAW Roberts. Assumed care of patient at this time.  Pt AAOx4, resting comfortably in bed, NAD, respirations E/UL, updated on POC, wheels locked and in low position, call bell with in reach, comfort positioning and restroom needs were addressed. Necessary items were placed with in reach and was advised when a reassessment would take place.

## 2023-11-10 NOTE — HPI
Reason for Consult: Management of T2DM, Hyperglycemia     Surgical Procedure and Date: n/a    Diabetes diagnosis year: 1998    Home Diabetes Medications:  Metformin (off since October)   Lab Results   Component Value Date    HGBA1C 7.6 (H) 10/12/2023       How often checking glucose at home?  Once daily in the AM    BG readings on regimen: 150-160s  Hypoglycemia on the regimen?  No  Missed doses on regimen?  n/a    Diabetes Complications include:     Hyperglycemia    Complicating diabetes co morbidities:   CAD s/p CABG, HTN, HLD      HPI:   Patient is a 63 y.o. male with a diagnosis of CAD s/p 3v CABG (unclear anatomy, 2009), ICM with a recent EF of 15-20% s/p ICD (medtronik 2009), DM2 (a1c 7.7), HTN, HLD, Vfib on amio who presents to the ED with CC of SOB. In the ED he was AF with stable VS on RA.  CBC showing chronic anemia.  CMP notable for acute on chronic CKD with baseline around 2.1 and Cr now 2.6.  BNP elevated.  Lactate neg.  CXR showing pulmonary edema. He was subsequently admitted to the CCU for diuresis.  Endocrinology consulted for management of T2DM.

## 2023-11-10 NOTE — NURSING
Nurses Note -- 4 Eyes      11/10/2023   6:59 AM      Skin assessed during: Admit      [x] No Altered Skin Integrity Present    []Prevention Measures Documented      [] Yes- Altered Skin Integrity Present or Discovered   [] LDA Added if Not in Epic (Describe Wound)   [] New Altered Skin Integrity was Present on Admit and Documented in LDA   [] Wound Image Taken    Wound Care Consulted? No    Attending Nurse:  Johnnie Avalos RN/Staff Member:   Ellis Joseph    Patient arrived from ED last night on a lasix and dobutamine drip, no complaints of pain or SOB on CSU, on room air, in bed with call light in reach and wife at bedside.

## 2023-11-10 NOTE — H&P
Staffed patient afternoon of 11/10/23  JVP to jaw sitting  Liver 12-13 cm  Edema 1+  He has had 3 hosp on  and ICD shock for VF last month  Cr 2.1  Increase lasix from 10 mg/hr to 20 mg/hr and observe output as may need further increase to 40 mg/hr   5    [facility] - [facility]iology Stepdown  Heart Transplant  H&P    Patient Name: Radha Abbott  MRN: [id number]  Admission Date: 11/9/2023  Attending Physician: No att. providers found  Primary Care Provider: Vasu Kong MD  Principal Problem:Acute on chronic combined systolic and diastolic CHF, NYHA class 4  tqa  Subjective:     History of Present Illness:  63 year old male with hx of CAD s/p 3v CABG (unclear anatomy, 2009), ICM with a recent EF of 15-20% s/p ICD (medmauricio 2009), DM2 (a1c 7.7), HTN, HLD, Vfib on amio who presents to the ED with CC of SOB     [place] [facility].  He was started on a Lasix gtt and did well.  Started on gDMT and discharged home on  5 with plans to follow up in HTS clinic for ongoing transplant evaluation at another facility.  He says that about a few days ago he started to notice LE swelling.  This turned into Nelson and orthopnea.  He says he can't walk to the bathroom without getting SOB.  He also complains of weight gain.  He takes torsemide 20mg BID at home and was told to trial additional Lasix which he did without any improvement.  He was rx metolazone but has not been filled.  He came to the ED     In the ED he was AF with stable VS on RA.  CBC showing chronic anemia.  CMP notable for acute on chronic CKD with baseline around 2.1 and Cr now 2.6.  BNP elevated.  Lactate neg.  CXR showing pulmonary edema.  I evaluated the pt at the bedside.  Bedside TTE showing CVP >15.  He was subsequently admitted to the CCU for diuresis.     He was continued on his home  and was started on a lasix gtt, which he responded well to overnight (net -1700cc. He feels much better this morning as well. Our transplant  coordinators have been working on insurance approval and he is now being transferred to \[facility]\"" service for transplant evaluation.     Past Medical History:   Diagnosis Date    CAD (coronary artery disease)     Diabetes mellitus     HFrEF (heart failure with reduced ejection fraction)     ICD (implantable cardioverter-defibrillator) in place     MI, old        Past Surgical History:   Procedure Laterality Date    CARDIAC SURGERY      RIGHT HEART CATHETERIZATION Right 10/10/2023    Procedure: INSERTION, CATHETER, RIGHT HEART;  Surgeon: Bin Gandhi MD;  Location: [facility] CATH LAB;  Service: Cardiology;  Laterality: Right;    RIGHT HEART CATHETERIZATION Right 10/13/2023    Procedure: INSERTION, CATHETER, RIGHT HEART;  Surgeon: Walter Mcintyre MD;  Location: [facility] CATH LAB;  Service: Cardiology;  Laterality: Right;       Review of patient's allergies indicates:  No Known Allergies    Current Facility-Administered Medications   Medication    amiodarone tablet 400 mg    apixaban tablet 5 mg    aspirin EC tablet 81 mg    atorvastatin tablet 40 mg    dextrose 10% bolus 125 mL 125 mL    dextrose 10% bolus 250 mL 250 mL    DOBUtamine 1000 mg in D5W 250 mL infusion    furosemide (LASIX) 500 mg in 50 mL infusion (conc: 10 mg/mL)    gabapentin capsule 300 mg    glucagon (human recombinant) injection 1 mg    glucose chewable tablet 16 g    glucose chewable tablet 24 g    insulin aspart U-100 pen 0-10 Units    multivitamin tablet    pantoprazole EC tablet 40 mg     Family History    None       Tobacco Use    Smoking status: Every Day     Current packs/day: 0.50     Types: Cigarettes    Smokeless tobacco: Not on file   Substance and Sexual Activity    Alcohol use: Yes     Comment: rarely    Drug use: No    Sexual activity: Not on file     Review of Systems   Constitutional:  Positive for unexpected weight change. Negative for appetite change and fever.   HENT:  Negative for congestion and sinus pain.    Eyes:  Negative for discharge  and redness.   Respiratory:  Positive for shortness of breath. Negative for cough.    Cardiovascular:  Positive for leg swelling. Negative for chest pain.   Gastrointestinal:  Positive for abdominal distention. Negative for diarrhea and nausea.   Endocrine: Negative for polydipsia and polyuria.   Genitourinary:  Negative for dysuria and hematuria.   Musculoskeletal:  Negative for arthralgias and myalgias.   Skin:  Negative for color change and rash.   Neurological:  Negative for weakness and numbness.   Psychiatric/Behavioral:  Negative for agitation and confusion.      Objective:     Vital Signs (Most Recent):  Temp: 98.7 °F (37.1 °C) (11/10/23 0539)  Pulse: 87 (11/10/23 0539)  Resp: 18 (11/10/23 0539)  BP: 110/64 (11/10/23 0539)  SpO2: 99 % (11/10/23 0539) Vital Signs (24h Range):  Temp:  [98.2 °F (36.8 °C)-98.8 °F (37.1 °C)] 98.7 °F (37.1 °C)  Pulse:  [] 87  Resp:  [18-20] 18  SpO2:  [93 %-100 %] 99 %  BP: ()/(59-70) 110/64     Patient Vitals for the past 72 hrs (Last 3 readings):   Weight   11/10/23 0030 80.2 kg (176 lb 12.9 oz)   11/09/23 0618 78.5 kg (173 lb)     Body mass index is 23.98 kg/m².      Intake/Output Summary (Last 24 hours) at 11/10/2023 0650  Last data filed at 11/10/2023 0556  Gross per 24 hour   Intake 342 ml   Output 2026 ml   Net -1684 ml          Physical Exam  Constitutional:       Appearance: Normal appearance.   Cardiovascular:      Rate and Rhythm: Normal rate and regular rhythm.      Heart sounds: Murmur (systolic over apex) heard.      Comments: JVP just above the clavicle with HJR to the mid-neck  Pulmonary:      Effort: Pulmonary effort is normal.      Breath sounds: Rales (bibasilar) present.   Abdominal:      General: Abdomen is flat.      Palpations: Abdomen is soft.   Musculoskeletal:      Right lower leg: No edema.      Left lower leg: No edema.   Skin:     General: Skin is warm and dry.   Neurological:      Mental Status: He is alert and oriented to person, place,  "and time. Mental status is at baseline.            Significant Labs:  CBC:  Recent Labs   Lab 11/07/23  1030 11/09/23  0656 11/10/23  0435   WBC 3.65* 3.55* 3.93   RBC 2.88* 2.96* 2.64*   HGB 8.6* 8.9* 8.1*   HCT 26.3* 26.6* 23.4*    218 228   MCV 91 90 89   MCH 29.9 30.1 30.7   MCHC 32.7 33.5 34.6     BNP:  Recent Labs   Lab 11/07/23  1030 11/09/23  0656   BNP 1,113* 1,204*     CMP:  Recent Labs   Lab 11/09/23  0656 11/09/23  1607 11/10/23  0020 11/10/23  0435   * 167* 194* 167*   CALCIUM 9.5 9.1 9.2 8.8   ALBUMIN 3.7  --   --   --    PROT 7.5  --   --   --    * 136 136 139   K 4.9 4.4 4.2 4.1   CO2 21* 21* 21* 20*    104 103 104   BUN 57* 56* 59* 57*   CREATININE 2.6* 2.2* 2.3* 2.1*   ALKPHOS 85  --   --   --    ALT 42  --   --   --    AST 26  --   --   --    BILITOT 0.8  --   --   --       Coagulation:   No results for input(s): "PT", "INR", "APTT" in the last 168 hours.  LDH:  No results for input(s): "LDH" in the last 72 hours.  Microbiology:  Microbiology Results (last 7 days)       ** No results found for the last 168 hours. **            I have reviewed all pertinent labs within the past 24 hours.    Diagnostic Results:  I have reviewed and interpreted all pertinent imaging results/findings within the past 24 hours.    Assessment/Plan:     * Acute on chronic combined systolic and diastolic CHF, NYHA class 4  Pt with known ICM with an EF of 25% on home  presenting with decompensated HF.  Not in cardiogenic shock  - Home  gtt at 5  - Continue lasix gtt, 10/hr  - hold home GDMT: entresto 24/26mg BID  - hold home diuretics: torsemide 20mg BID and metolazone 2.5mg PRN  - will initiate TXP evaluation    PAF (paroxysmal atrial fibrillation)  Known hx of pAF. In sinus rhythm on admission, but 1 run of AF RVR overnight. He spontaneously converted.  - Continue home amiodarone 400mg qd  - Continue home Eliquis  - Continue home protonix    IVÁN (acute kidney injury)  IVÁN on CKD2. Baseline " Cr 2.1  - Trend with diuresis  - Hold ARNi    Type 2 diabetes mellitus with diabetic polyneuropathy, without long-term current use of insulin  A1c 7.6, not insulin dependent  - MDSSI  - Consider endocrine if uncontrolled    CAD (coronary artery disease)  S/p 3vCABG in 2009  - continue home ASA, HI statin    Ventricular tachycardia  Hx VT s/p ICD placement (medtronic 2009)  - Continue home amio 400mg qd          Connor M Gillies, MD  Heart Transplant  [facility] - Cardiology Stepdown

## 2023-11-10 NOTE — SUBJECTIVE & OBJECTIVE
Past Medical History:   Diagnosis Date    CAD (coronary artery disease)     Diabetes mellitus     HFrEF (heart failure with reduced ejection fraction)     ICD (implantable cardioverter-defibrillator) in place     MI, old        Past Surgical History:   Procedure Laterality Date    CARDIAC SURGERY      RIGHT HEART CATHETERIZATION Right 10/10/2023    Procedure: INSERTION, CATHETER, RIGHT HEART;  Surgeon: Bin Gandhi MD;  Location: [facility] CATH LAB;  Service: Cardiology;  Laterality: Right;    RIGHT HEART CATHETERIZATION Right 10/13/2023    Procedure: INSERTION, CATHETER, RIGHT HEART;  Surgeon: Walter Mcintyre MD;  Location: [facility] CATH LAB;  Service: Cardiology;  Laterality: Right;       Review of patient's allergies indicates:  No Known Allergies    Current Facility-Administered Medications   Medication    amiodarone tablet 400 mg    apixaban tablet 5 mg    aspirin EC tablet 81 mg    atorvastatin tablet 40 mg    dextrose 10% bolus 125 mL 125 mL    dextrose 10% bolus 250 mL 250 mL    DOBUtamine 1000 mg in D5W 250 mL infusion    furosemide (LASIX) 500 mg in 50 mL infusion (conc: 10 mg/mL)    gabapentin capsule 300 mg    glucagon (human recombinant) injection 1 mg    glucose chewable tablet 16 g    glucose chewable tablet 24 g    insulin aspart U-100 pen 0-10 Units    multivitamin tablet    pantoprazole EC tablet 40 mg     Family History    None       Tobacco Use    Smoking status: Every Day     Current packs/day: 0.50     Types: Cigarettes    Smokeless tobacco: Not on file   Substance and Sexual Activity    Alcohol use: Yes     Comment: rarely    Drug use: No    Sexual activity: Not on file     Review of Systems   Constitutional:  Positive for unexpected weight change. Negative for appetite change and fever.   HENT:  Negative for congestion and sinus pain.    Eyes:  Negative for discharge and redness.   Respiratory:  Positive for shortness of breath. Negative for cough.    Cardiovascular:  Positive for leg swelling.  Negative for chest pain.   Gastrointestinal:  Positive for abdominal distention. Negative for diarrhea and nausea.   Endocrine: Negative for polydipsia and polyuria.   Genitourinary:  Negative for dysuria and hematuria.   Musculoskeletal:  Negative for arthralgias and myalgias.   Skin:  Negative for color change and rash.   Neurological:  Negative for weakness and numbness.   Psychiatric/Behavioral:  Negative for agitation and confusion.      Objective:     Vital Signs (Most Recent):  Temp: 98.7 °F (37.1 °C) (11/10/23 0539)  Pulse: 87 (11/10/23 0539)  Resp: 18 (11/10/23 0539)  BP: 110/64 (11/10/23 0539)  SpO2: 99 % (11/10/23 0539) Vital Signs (24h Range):  Temp:  [98.2 °F (36.8 °C)-98.8 °F (37.1 °C)] 98.7 °F (37.1 °C)  Pulse:  [] 87  Resp:  [18-20] 18  SpO2:  [93 %-100 %] 99 %  BP: ()/(59-70) 110/64     Patient Vitals for the past 72 hrs (Last 3 readings):   Weight   11/10/23 0030 80.2 kg (176 lb 12.9 oz)   11/09/23 0618 78.5 kg (173 lb)     Body mass index is 23.98 kg/m².      Intake/Output Summary (Last 24 hours) at 11/10/2023 0650  Last data filed at 11/10/2023 0556  Gross per 24 hour   Intake 342 ml   Output 2026 ml   Net -1684 ml          Physical Exam  Constitutional:       Appearance: Normal appearance.   Cardiovascular:      Rate and Rhythm: Normal rate and regular rhythm.      Heart sounds: Murmur (systolic over apex) heard.      Comments: JVP just above the clavicle with HJR to the mid-neck  Pulmonary:      Effort: Pulmonary effort is normal.      Breath sounds: Rales (bibasilar) present.   Abdominal:      General: Abdomen is flat.      Palpations: Abdomen is soft.   Musculoskeletal:      Right lower leg: No edema.      Left lower leg: No edema.   Skin:     General: Skin is warm and dry.   Neurological:      Mental Status: He is alert and oriented to person, place, and time. Mental status is at baseline.            Significant Labs:  CBC:  Recent Labs   Lab 11/07/23  1030 11/09/23  0656  "11/10/23  0435   WBC 3.65* 3.55* 3.93   RBC 2.88* 2.96* 2.64*   HGB 8.6* 8.9* 8.1*   HCT 26.3* 26.6* 23.4*    218 228   MCV 91 90 89   MCH 29.9 30.1 30.7   MCHC 32.7 33.5 34.6     BNP:  Recent Labs   Lab 11/07/23  1030 11/09/23  0656   BNP 1,113* 1,204*     CMP:  Recent Labs   Lab 11/09/23  0656 11/09/23  1607 11/10/23  0020 11/10/23  0435   * 167* 194* 167*   CALCIUM 9.5 9.1 9.2 8.8   ALBUMIN 3.7  --   --   --    PROT 7.5  --   --   --    * 136 136 139   K 4.9 4.4 4.2 4.1   CO2 21* 21* 21* 20*    104 103 104   BUN 57* 56* 59* 57*   CREATININE 2.6* 2.2* 2.3* 2.1*   ALKPHOS 85  --   --   --    ALT 42  --   --   --    AST 26  --   --   --    BILITOT 0.8  --   --   --       Coagulation:   No results for input(s): "PT", "INR", "APTT" in the last 168 hours.  LDH:  No results for input(s): "LDH" in the last 72 hours.  Microbiology:  Microbiology Results (last 7 days)       ** No results found for the last 168 hours. **            I have reviewed all pertinent labs within the past 24 hours.    Diagnostic Results:  I have reviewed and interpreted all pertinent imaging results/findings within the past 24 hours.    "

## 2023-11-11 PROBLEM — I48.91 ATRIAL FIBRILLATION WITH TACHYCARDIC VENTRICULAR RATE: Status: ACTIVE | Noted: 2023-10-15

## 2023-11-11 LAB
ALLENS TEST: ABNORMAL
ANION GAP SERPL CALC-SCNC: 10 MMOL/L (ref 8–16)
APTT PPP: 31.3 SEC (ref 21–32)
APTT PPP: 37.8 SEC (ref 21–32)
BASOPHILS # BLD AUTO: 0.04 K/UL (ref 0–0.2)
BASOPHILS NFR BLD: 1.2 % (ref 0–1.9)
BUN SERPL-MCNC: 55 MG/DL (ref 8–23)
CALCIUM SERPL-MCNC: 9.2 MG/DL (ref 8.7–10.5)
CHLORIDE SERPL-SCNC: 105 MMOL/L (ref 95–110)
CO2 SERPL-SCNC: 23 MMOL/L (ref 23–29)
CREAT SERPL-MCNC: 2.4 MG/DL (ref 0.5–1.4)
DIFFERENTIAL METHOD BLD: ABNORMAL
EOSINOPHIL # BLD AUTO: 0.1 K/UL (ref 0–0.5)
EOSINOPHIL NFR BLD: 1.8 % (ref 0–8)
ERYTHROCYTE [DISTWIDTH] IN BLOOD BY AUTOMATED COUNT: 14.5 % (ref 11.5–14.5)
EST. GFR  (NO RACE VARIABLE): 29.9 ML/MIN/1.73 M^2
GLUCOSE SERPL-MCNC: 129 MG/DL (ref 70–110)
HCO3 UR-SCNC: 22.1 MMOL/L (ref 24–28)
HCT VFR BLD AUTO: 24.4 % (ref 40–54)
HGB BLD-MCNC: 8 G/DL (ref 14–18)
IMM GRANULOCYTES # BLD AUTO: 0.01 K/UL (ref 0–0.04)
IMM GRANULOCYTES NFR BLD AUTO: 0.3 % (ref 0–0.5)
INR PPP: 1.2 (ref 0.8–1.2)
LYMPHOCYTES # BLD AUTO: 0.6 K/UL (ref 1–4.8)
LYMPHOCYTES NFR BLD: 19.6 % (ref 18–48)
MAGNESIUM SERPL-MCNC: 2.4 MG/DL (ref 1.6–2.6)
MCH RBC QN AUTO: 29.6 PG (ref 27–31)
MCHC RBC AUTO-ENTMCNC: 32.8 G/DL (ref 32–36)
MCV RBC AUTO: 90 FL (ref 82–98)
MONOCYTES # BLD AUTO: 0.3 K/UL (ref 0.3–1)
MONOCYTES NFR BLD: 8.9 % (ref 4–15)
NEUTROPHILS # BLD AUTO: 2.2 K/UL (ref 1.8–7.7)
NEUTROPHILS NFR BLD: 68.2 % (ref 38–73)
NRBC BLD-RTO: 0 /100 WBC
PCO2 BLDA: 37.2 MMHG (ref 35–45)
PH SMN: 7.38 [PH] (ref 7.35–7.45)
PHOSPHATE SERPL-MCNC: 3.9 MG/DL (ref 2.7–4.5)
PLATELET # BLD AUTO: 233 K/UL (ref 150–450)
PMV BLD AUTO: 11.1 FL (ref 9.2–12.9)
PO2 BLDA: 24 MMHG (ref 40–60)
POC BE: -3 MMOL/L
POC SATURATED O2: 43 % (ref 95–100)
POC SVO2: 40 %
POC TCO2: 23 MMOL/L (ref 24–29)
POCT GLUCOSE: 158 MG/DL (ref 70–110)
POCT GLUCOSE: 229 MG/DL (ref 70–110)
POCT GLUCOSE: 263 MG/DL (ref 70–110)
POCT GLUCOSE: 291 MG/DL (ref 70–110)
POTASSIUM SERPL-SCNC: 4.3 MMOL/L (ref 3.5–5.1)
PROTHROMBIN TIME: 12.4 SEC (ref 9–12.5)
RBC # BLD AUTO: 2.7 M/UL (ref 4.6–6.2)
SAMPLE: ABNORMAL
SITE: ABNORMAL
SODIUM SERPL-SCNC: 138 MMOL/L (ref 136–145)
WBC # BLD AUTO: 3.26 K/UL (ref 3.9–12.7)

## 2023-11-11 PROCEDURE — 83735 ASSAY OF MAGNESIUM: CPT | Mod: NTX | Performed by: STUDENT IN AN ORGANIZED HEALTH CARE EDUCATION/TRAINING PROGRAM

## 2023-11-11 PROCEDURE — 99232 SBSQ HOSP IP/OBS MODERATE 35: CPT | Mod: NTX,,, | Performed by: NURSE PRACTITIONER

## 2023-11-11 PROCEDURE — 97161 PT EVAL LOW COMPLEX 20 MIN: CPT | Mod: NTX

## 2023-11-11 PROCEDURE — 80048 BASIC METABOLIC PNL TOTAL CA: CPT | Mod: NTX | Performed by: STUDENT IN AN ORGANIZED HEALTH CARE EDUCATION/TRAINING PROGRAM

## 2023-11-11 PROCEDURE — 63600175 PHARM REV CODE 636 W HCPCS: Mod: NTX | Performed by: STUDENT IN AN ORGANIZED HEALTH CARE EDUCATION/TRAINING PROGRAM

## 2023-11-11 PROCEDURE — 82803 BLOOD GASES ANY COMBINATION: CPT | Mod: NTX

## 2023-11-11 PROCEDURE — 85730 THROMBOPLASTIN TIME PARTIAL: CPT | Mod: 91,NTX | Performed by: INTERNAL MEDICINE

## 2023-11-11 PROCEDURE — 20600001 HC STEP DOWN PRIVATE ROOM: Mod: NTX

## 2023-11-11 PROCEDURE — 85610 PROTHROMBIN TIME: CPT | Mod: NTX | Performed by: STUDENT IN AN ORGANIZED HEALTH CARE EDUCATION/TRAINING PROGRAM

## 2023-11-11 PROCEDURE — 25000003 PHARM REV CODE 250: Mod: NTX | Performed by: PHYSICIAN ASSISTANT

## 2023-11-11 PROCEDURE — 25000003 PHARM REV CODE 250: Mod: NTX | Performed by: STUDENT IN AN ORGANIZED HEALTH CARE EDUCATION/TRAINING PROGRAM

## 2023-11-11 PROCEDURE — 97166 OT EVAL MOD COMPLEX 45 MIN: CPT | Mod: NTX

## 2023-11-11 PROCEDURE — 85025 COMPLETE CBC W/AUTO DIFF WBC: CPT | Mod: NTX | Performed by: STUDENT IN AN ORGANIZED HEALTH CARE EDUCATION/TRAINING PROGRAM

## 2023-11-11 PROCEDURE — 84100 ASSAY OF PHOSPHORUS: CPT | Mod: NTX | Performed by: STUDENT IN AN ORGANIZED HEALTH CARE EDUCATION/TRAINING PROGRAM

## 2023-11-11 PROCEDURE — 97535 SELF CARE MNGMENT TRAINING: CPT | Mod: NTX

## 2023-11-11 PROCEDURE — 99233 SBSQ HOSP IP/OBS HIGH 50: CPT | Mod: NTX,,, | Performed by: INTERNAL MEDICINE

## 2023-11-11 PROCEDURE — 63600175 PHARM REV CODE 636 W HCPCS: Mod: NTX | Performed by: NURSE PRACTITIONER

## 2023-11-11 PROCEDURE — 97116 GAIT TRAINING THERAPY: CPT | Mod: NTX

## 2023-11-11 PROCEDURE — 99900035 HC TECH TIME PER 15 MIN (STAT): Mod: NTX

## 2023-11-11 PROCEDURE — 85730 THROMBOPLASTIN TIME PARTIAL: CPT | Mod: NTX | Performed by: STUDENT IN AN ORGANIZED HEALTH CARE EDUCATION/TRAINING PROGRAM

## 2023-11-11 RX ORDER — HEPARIN SODIUM,PORCINE/D5W 25000/250
0-40 INTRAVENOUS SOLUTION INTRAVENOUS CONTINUOUS
Status: DISCONTINUED | OUTPATIENT
Start: 2023-11-11 | End: 2023-12-02

## 2023-11-11 RX ORDER — FUROSEMIDE 10 MG/ML
80 INJECTION INTRAMUSCULAR; INTRAVENOUS ONCE
Status: COMPLETED | OUTPATIENT
Start: 2023-11-11 | End: 2023-11-11

## 2023-11-11 RX ADMIN — AMIODARONE HYDROCHLORIDE 400 MG: 200 TABLET ORAL at 08:11

## 2023-11-11 RX ADMIN — ACETAMINOPHEN 650 MG: 325 TABLET ORAL at 08:11

## 2023-11-11 RX ADMIN — POTASSIUM CHLORIDE 40 MEQ: 1500 TABLET, EXTENDED RELEASE ORAL at 08:11

## 2023-11-11 RX ADMIN — FUROSEMIDE 30 MG/HR: 10 INJECTION, SOLUTION INTRAMUSCULAR; INTRAVENOUS at 05:11

## 2023-11-11 RX ADMIN — THERA TABS 1 TABLET: TAB at 08:11

## 2023-11-11 RX ADMIN — Medication 400 MG: at 08:11

## 2023-11-11 RX ADMIN — INSULIN ASPART 4 UNITS: 100 INJECTION, SOLUTION INTRAVENOUS; SUBCUTANEOUS at 12:11

## 2023-11-11 RX ADMIN — INSULIN ASPART 3 UNITS: 100 INJECTION, SOLUTION INTRAVENOUS; SUBCUTANEOUS at 06:11

## 2023-11-11 RX ADMIN — ATORVASTATIN CALCIUM 40 MG: 40 TABLET, FILM COATED ORAL at 08:11

## 2023-11-11 RX ADMIN — DOBUTAMINE IN DEXTROSE 5 MCG/KG/MIN: 400 INJECTION, SOLUTION INTRAVENOUS at 05:11

## 2023-11-11 RX ADMIN — INSULIN ASPART 4 UNITS: 100 INJECTION, SOLUTION INTRAVENOUS; SUBCUTANEOUS at 08:11

## 2023-11-11 RX ADMIN — FUROSEMIDE 80 MG: 10 INJECTION, SOLUTION INTRAVENOUS at 06:11

## 2023-11-11 RX ADMIN — PANTOPRAZOLE SODIUM 40 MG: 40 TABLET, DELAYED RELEASE ORAL at 05:11

## 2023-11-11 RX ADMIN — INSULIN ASPART 4 UNITS: 100 INJECTION, SOLUTION INTRAVENOUS; SUBCUTANEOUS at 06:11

## 2023-11-11 RX ADMIN — ASPIRIN 81 MG: 81 TABLET, COATED ORAL at 08:11

## 2023-11-11 RX ADMIN — INSULIN ASPART 2 UNITS: 100 INJECTION, SOLUTION INTRAVENOUS; SUBCUTANEOUS at 12:11

## 2023-11-11 RX ADMIN — HEPARIN SODIUM AND DEXTROSE 12 UNITS/KG/HR: 10000; 5 INJECTION INTRAVENOUS at 10:11

## 2023-11-11 RX ADMIN — INSULIN ASPART 1 UNITS: 100 INJECTION, SOLUTION INTRAVENOUS; SUBCUTANEOUS at 08:11

## 2023-11-11 RX ADMIN — ACETAMINOPHEN 650 MG: 325 TABLET ORAL at 05:11

## 2023-11-11 NOTE — ASSESSMENT & PLAN NOTE
Pt with known ICM with an EF of 25% on home  presenting with decompensated HF.  Not in cardiogenic shock  - Home  gtt at 5  - Decreased lasix 30gtt to 20gtt.   - hold home GDMT: entresto 24/26mg BID  - hold home diuretics: torsemide 20mg BID and metolazone 2.5mg PRN  - will initiate TXP evaluation

## 2023-11-11 NOTE — PROGRESS NOTES
[facility] - [facility] Stepdown  Endocrinology  Progress Note    Admit Date: 2023     Reason for Consult: Management of T2DM, Hyperglycemia     Surgical Procedure and Date: n/a    Diabetes diagnosis year:     Home Diabetes Medications:  Metformin (off since October)   Lab Results   Component Value Date    HGBA1C 7.6 (H) 10/12/2023       How often checking glucose at home?  Once daily in the AM    BG readings on regimen: 150-160s  Hypoglycemia on the regimen?  No  Missed doses on regimen?  n/a    Diabetes Complications include:     Hyperglycemia    Complicating diabetes co morbidities:   CAD s/p CABG, HTN, HLD      HPI:   Patient is a 63 y.o. male with a diagnosis of CAD s/p 3v CABG (unclear anatomy, ), ICM with a recent EF of 15-20% s/p ICD (medtronik ), DM2 (a1c 7.7), HTN, HLD, Vfib on amio who presents to the ED with CC of SOB. In the ED he was AF with stable VS on RA.  CBC showing chronic anemia.  CMP notable for acute on chronic CKD with baseline around 2.1 and Cr now 2.6.  BNP elevated.  Lactate neg.  CXR showing pulmonary edema. He was subsequently admitted to the CCU for diuresis.  Endocrinology consulted for management of T2DM.            Interval HPI:   Overnight events: Remains in CSU. BG reasonably well controlled on current SQ insulin regimen. Creatinine 2.4. Diet Cardiac    Eatin%  Nausea: No  Hypoglycemia and intervention: No  Fever: No  TPN and/or TF: No  If yes, type of TF/TPN and rate: n/a    /66 (BP Location: Left arm)   Pulse 77   Temp 97.4 °F (36.3 °C) (Oral)   Resp 18   Ht 6' (1.829 m)   Wt 77.8 kg (171 lb 8.3 oz)   SpO2 100%   BMI 23.26 kg/m²     Labs Reviewed and Include    Recent Labs   Lab 11/10/23  1816 23  0508   * 129*   CALCIUM 9.2 9.2   ALBUMIN 3.8  --    PROT 7.3  --     138   K 4.8 4.3   CO2 21* 23    105   BUN 57* 55*   CREATININE 2.5* 2.4*   ALKPHOS 89  --    ALT 37  --    AST 19  --    BILITOT 1.0  --      Lab Results  "  Component Value Date    WBC 3.26 (L) 11/11/2023    HGB 8.0 (L) 11/11/2023    HCT 24.4 (L) 11/11/2023    MCV 90 11/11/2023     11/11/2023     No results for input(s): "TSH", "FREET4" in the last 168 hours.  Lab Results   Component Value Date    HGBA1C 7.6 (H) 10/12/2023       Nutritional status:   Body mass index is 23.26 kg/m².  Lab Results   Component Value Date    ALBUMIN 3.8 11/10/2023    ALBUMIN 3.7 11/09/2023    ALBUMIN 3.1 (L) 11/01/2023     No results found for: "PREALBUMIN"    Estimated Creatinine Clearance: 35.5 mL/min (A) (based on SCr of 2.4 mg/dL (H)).    Accu-Checks  Recent Labs     11/09/23  1212 11/10/23  0728 11/10/23  1308 11/10/23  1653 11/11/23  0833   POCTGLUCOSE 189* 191* 282* 130* 158*       Current Medications and/or Treatments Impacting Glycemic Control  Immunotherapy:    Immunosuppressants       None          Steroids:   Hormones (From admission, onward)      None          Pressors:    Autonomic Drugs (From admission, onward)      None          Hyperglycemia/Diabetes Medications:   Antihyperglycemics (From admission, onward)      Start     Stop Route Frequency Ordered    11/10/23 1645  insulin aspart U-100 pen 4 Units         -- SubQ 3 times daily with meals 11/10/23 1503    11/10/23 1602  insulin aspart U-100 pen 0-5 Units         -- SubQ Before meals & nightly PRN 11/10/23 1503            ASSESSMENT and PLAN    Cardiac/Vascular  * Acute on chronic combined systolic and diastolic CHF, NYHA class 4  Managed per primary team  Optimize BG control      PAF (paroxysmal atrial fibrillation)  May increase insulin resistance.         Renal/  IVÁN (acute kidney injury)  Lab Results   Component Value Date    CREATININE 2.4 (H) 11/11/2023     Avoid insulin stacking  Titrate insulin slowly       Endocrine  Type 2 diabetes mellitus without complication, without long-term current use of insulin  BG goal 140-180    Continue  Novolog 4 units TID with meals (0.3 u/kg dosing)   Low Dose Correction " Scale (given kidney function)  BG monitoring ac/hs    ** Please call Endocrine for any BG related issues **    Discharge plans: TBD    Lab Results   Component Value Date    HGBA1C 7.6 (H) 10/12/2023               Morena Escobar NP  Endocrinology  [facility] - Cardiology Step[facility]

## 2023-11-11 NOTE — PT/OT/SLP EVAL
Occupational Therapy  Co- Evaluation, Co-Treatment and Discharge Note    Name: Radha Abbott  MRN: [id number]  Admitting Diagnosis: Acute on chronic combined systolic and diastolic CHF, NYHA class 4  Recent Surgery: * No surgery found *      Recommendations:     Discharge Recommendations: No Therapy Indicated  Discharge Equipment Recommendations: none  Barriers to discharge:  None    Assessment:     Radha Abbott is a 63 y.o. male with a medical diagnosis of Acute on chronic combined systolic and diastolic CHF, NYHA class 4. Patient educated on sternal precautions due to status as potential advanced workup patient. Patient able to completed sit> stand without upper extremity use. At this time, patient is functioning at their prior level of function and does not require further acute OT services.     Plan:     During this hospitalization, patient does not require further acute OT services.  Please re-consult if situation changes.    Plan of Care Reviewed with: patient, spouse    Subjective     Chief Complaint: none verbalize  Patient/Family Comments/goals: get better     Occupational Profile:  Living Environment: Patient lives with wife in a one story home with threshold steps to enter . Patient has both a bathtub shower combo and walk in shower with built in bench/grab bars.  Previous level of function: Patient reports they were independent with ADLs and IADLs prior to hospitalization.   Roles and Routines: , not working  Equipment Used at home: bath bench, grab bar  Assistance upon Discharge: wife and sisters    Pain/Comfort:  Pain Rating 1: 0/10  Pain Rating Post-Intervention 1: 0/10    Patients cultural, spiritual, Gnosticist conflicts given the current situation: no    Objective:   Co-evaluation and treatment necessary due for safe and accurate assessment of patient's activity tolerance due to patient's medical complexity.    Communicated with: nursing prior to session.  Patient found HOB elevated with  peripheral IV, telemetry upon OT entry to room. Patient's wife present in room during session.     General Precautions: Standard, fall  Orthopedic Precautions: N/A  Braces: N/A  Respiratory Status: Room air     Occupational Performance:    Bed Mobility:    Patient completed Supine to Sit with supervision  Patient sat EOB for ~ 3 minutes with Supervision or Set-up Assistance    Functional Mobility/Transfers:  Patient completed Sit <> Stand Transfer with stand by assistance  with  no assistive device   Functional Mobility: patient ambulated community level distances with stand by assistance and no AD.     Activities of Daily Living:  Grooming: independent to wash face and brush teeth while standing at sink  Lower body dressing: independent to don socks     Cognitive/Visual Perceptual:  Cognitive/Psychosocial Skills:     -       Oriented to: Person, Place, Time, and Situation   -       Follows Commands/attention:Follows multistep  commands  -       Communication: clear/fluent  -       Memory: No Deficits noted  -       Safety awareness/insight to disability: intact   -       Mood/Affect/Coping skills/emotional control: Appropriate to situation    Physical Exam:  Sensation:    -       Intact  Dominant hand:    -       R  Upper Extremity Range of Motion:     -       Right Upper Extremity: WFL  -       Left Upper Extremity: WFL  Upper Extremity Strength:    -       Right Upper Extremity: WFL  -       Left Upper Extremity: WFL   Strength:    -       Right Upper Extremity: WFL  -       Left Upper Extremity: WFL  Fine Motor Coordination:    -       Intact on RLE and BLE but diminished L hand coordination    AMPAC 6 Click ADL:  AMPAC Total Score: 24    Treatment & Education:    Patient educated on:   -purpose of OT and OT POC  -importance of early mobility and out of bed activities with staff/family assist  -sternal precautions (handout given)    All questions answered within OT scope and to patient's  satisfaction    Patient left up in chair with all lines intact, call button in reach, and wife present    GOALS:   Multidisciplinary Problems       Occupational Therapy Goals       Not on file                    History:     Past Medical History:   Diagnosis Date    CAD (coronary artery disease)     Diabetes mellitus     HFrEF (heart failure with reduced ejection fraction)     ICD (implantable cardioverter-defibrillator) in place     MI, old          Past Surgical History:   Procedure Laterality Date    CARDIAC SURGERY      RIGHT HEART CATHETERIZATION Right 10/10/2023    Procedure: INSERTION, CATHETER, RIGHT HEART;  Surgeon: Bin Gandhi MD;  Location: [facility] CATH LAB;  Service: Cardiology;  Laterality: Right;    RIGHT HEART CATHETERIZATION Right 10/13/2023    Procedure: INSERTION, CATHETER, RIGHT HEART;  Surgeon: Walter Mcintyre MD;  Location: [facility] CATH LAB;  Service: Cardiology;  Laterality: Right;       Time Tracking:     OT Date of Treatment: 11/11/23  OT Start Time: 0757  OT Stop Time: 0817  OT Total Time (min): 20 min    Billable Minutes:Evaluation 10  Self Care/Home Management 10    11/11/2023

## 2023-11-11 NOTE — PT/OT/SLP EVAL
Physical Therapy Co- Evaluation and Co-Treatment with OT/ Discharge Note    Patient Name:  Radha Abbott   MRN:  [id number]    Recommendations:     Discharge Recommendations: No Therapy Indicated  Discharge Equipment Recommendations: none   Barriers to discharge: None    Assessment:     Radha Abbott is a 63 y.o. male admitted with a medical diagnosis of Acute on chronic combined systolic and diastolic CHF, NYHA class 4. .  At this time, patient is functioning at their prior level of function and does not require further acute PT services. Pt is safe to ambulate on floor with nursing staff and wife to maintain physical status.    Recent Surgery: * No surgery found *      Plan:     During this hospitalization, patient does not require further acute PT services.  Please re-consult if situation changes.      Subjective     Chief Complaint: none  Patient/Family Comments/goals: to stay strong  Pain/Comfort:  Pain Rating 1: 0/10  Pain Addressed 1: Reposition, Distraction    Patients cultural, spiritual, Druze conflicts given the current situation: no    Living Environment:  Pt lives with wife in [facility] with one threshold to enter. Pt reports having walk in shower as well as T/S combo with grab bars and bath bench. Pt was not working or driving prior to admit.   Prior to admission, patients level of function was independent.  Equipment used at home: bath bench, grab bar.  DME owned (not currently used): none.  Upon discharge, patient will have assistance from wife and sisters.    Objective:     Communicated with nurse prior to session.  Patient found HOB elevated with telemetry, peripheral IV with wife upon PT entry to room.    General Precautions: Standard, fall    Orthopedic Precautions:N/A   Braces: N/A  Respiratory Status: Room air    Exams:  Cognitive Exam:  Patient is oriented to Person, Place, Time, and Situation  RLE ROM: WFL  RLE Strength: WFL  LLE ROM: WFL  LLE Strength: WFL    Functional Mobility:  Bed Mobility:      Supine to Sit: supervision for medical lines  Transfers:     Sit to Stand:  stand by assistance with no AD x2 trials from bed, one with BUE support and one without BUE support  Gait: 320 feet with no AD and SBA, pt gait trained in hallway demonstrating efficient gait mechanics and no LOB during trial  Balance: pt sat EOB with SPV and stood at sink to perform ADLs with OT with independence    PT concentrated on BLE MMT, ROM and gait training with evaluation and treatment  Due to pt complex medical condition, the skill of 2 licensed therapists is needed to maximize treatment session and progression towards goals  Pt white board updated with current therapists name and level of mobility assistance needed.       AM-PAC 6 CLICK MOBILITY  Total Score:24       Treatment and Education:  PT verbally educated pt and pt's wife on PT POC with both verbalizing understanding of such. PT provided sternal precaution handout for pt's wife and addressed all questions and concerns. PT encouraged pt to sit up in chair and ambulate several times throughout the day to maintain strength and conditioning.     AM-PAC 6 CLICK MOBILITY  Total Score:24     Patient left up in chair with all lines intact, call button in reach, and wife present.    GOALS:   Multidisciplinary Problems       Physical Therapy Goals       Not on file              Multidisciplinary Problems (Resolved)          Problem: Physical Therapy    Goal Priority Disciplines Outcome Goal Variances Interventions   Physical Therapy Goal   (Resolved)     PT, PT/OT Met     Description: Goals to be met by: 23     Patient will increase functional independence with mobility by performin. Evaluate pt's need for physical therapy.                          History:     Past Medical History:   Diagnosis Date    CAD (coronary artery disease)     Diabetes mellitus     HFrEF (heart failure with reduced ejection fraction)     ICD (implantable cardioverter-defibrillator) in place      MI, old        Past Surgical History:   Procedure Laterality Date    CARDIAC SURGERY      RIGHT HEART CATHETERIZATION Right 10/10/2023    Procedure: INSERTION, CATHETER, RIGHT HEART;  Surgeon: Bin Gandhi MD;  Location: [facility] C[facility] LAB;  Service: Cardiology;  Laterality: Right;    RIGHT HEART CATHETERIZATION Right 10/13/2023    Procedure: INSERTION, CATHETER, RIGHT HEART;  Surgeon: Walter Mcintyre MD;  Location: [facility] CATH LAB;  Service: Cardiology;  Laterality: Right;       Time Tracking:     PT Received On: 11/11/23  PT Start Time: 0758     PT Stop Time: 0817  PT Total Time (min): 19 min     Billable Minutes: Evaluation 8 minutes and Gait Training 11 minutes      11/11/2023

## 2023-11-11 NOTE — ASSESSMENT & PLAN NOTE
Lab Results   Component Value Date    CREATININE 2.4 (H) 11/11/2023     Avoid insulin stacking  Titrate insulin slowly

## 2023-11-11 NOTE — SUBJECTIVE & OBJECTIVE
"Interval HPI:   Overnight events: Remains in CSU. BG reasonably well controlled on current SQ insulin regimen. Creatinine 2.4. Diet Cardiac    Eatin%  Nausea: No  Hypoglycemia and intervention: No  Fever: No  TPN and/or TF: No  If yes, type of TF/TPN and rate: n/a    /66 (BP Location: Left arm)   Pulse 77   Temp 97.4 °F (36.3 °C) (Oral)   Resp 18   Ht 6' (1.829 m)   Wt 77.8 kg (171 lb 8.3 oz)   SpO2 100%   BMI 23.26 kg/m²     Labs Reviewed and Include    Recent Labs   Lab 11/10/23  1816 23  0508   * 129*   CALCIUM 9.2 9.2   ALBUMIN 3.8  --    PROT 7.3  --     138   K 4.8 4.3   CO2 21* 23    105   BUN 57* 55*   CREATININE 2.5* 2.4*   ALKPHOS 89  --    ALT 37  --    AST 19  --    BILITOT 1.0  --      Lab Results   Component Value Date    WBC 3.26 (L) 2023    HGB 8.0 (L) 2023    HCT 24.4 (L) 2023    MCV 90 2023     2023     No results for input(s): "TSH", "FREET4" in the last 168 hours.  Lab Results   Component Value Date    HGBA1C 7.6 (H) 10/12/2023       Nutritional status:   Body mass index is 23.26 kg/m².  Lab Results   Component Value Date    ALBUMIN 3.8 11/10/2023    ALBUMIN 3.7 2023    ALBUMIN 3.1 (L) 2023     No results found for: "PREALBUMIN"    Estimated Creatinine Clearance: 35.5 mL/min (A) (based on SCr of 2.4 mg/dL (H)).    Accu-Checks  Recent Labs     23  1212 11/10/23  0728 11/10/23  1308 11/10/23  1653 23  0833   POCTGLUCOSE 189* 191* 282* 130* 158*       Current Medications and/or Treatments Impacting Glycemic Control  Immunotherapy:    Immunosuppressants       None          Steroids:   Hormones (From admission, onward)      None          Pressors:    Autonomic Drugs (From admission, onward)      None          Hyperglycemia/Diabetes Medications:   Antihyperglycemics (From admission, onward)      Start     Stop Route Frequency Ordered    11/10/23 9488  insulin aspart U-100 pen 4 Units         -- " SubQ 3 times daily with meals 11/10/23 1503    11/10/23 1602  insulin aspart U-100 pen 0-5 Units         -- SubQ Before meals & nightly PRN 11/10/23 150

## 2023-11-11 NOTE — PROGRESS NOTES
[facility] - Cardiology Stepdown  Heart Transplant  Progress Note    Patient Name: Radha Abbott  MRN: [id number]  Admission Date: 11/9/2023  Hospital Length of Stay: 2 days  Attending Physician: Juventino Bermudez Jr.*  Primary Care Provider: Vasu Kong MD  Principal Problem:Acute on chronic combined systolic and diastolic CHF, NYHA class 4    Subjective:   Interval History: Yesterday evening patient complained of increased abdominal bloating and SOB. LA was normal and JVP was around 13cm. Increased lasix to 30gtt. This morning patient states he feels much better. No SOB on exertion, no orthopnea, and no abdominal bloating. JVP at clavicle. Will look to titrate down lasix gtt.     CVP 8  SVO2 40  CO/CI 4.71/ 2.36  SVR 1228  Continuous Infusions:   DOBUTamine 5 mcg/kg/min (11/11/23 0509)    furosemide (LASIX) 500 mg in 50 mL infusion (conc: 10 mg/mL) 30 mg/hr (11/11/23 0509)    heparin (porcine) in D5W 12 Units/kg/hr (11/11/23 1000)     Scheduled Meds:   amiodarone  400 mg Oral Daily    aspirin  81 mg Oral Daily    atorvastatin  40 mg Oral Daily    insulin aspart U-100  4 Units Subcutaneous TIDWM    magnesium oxide  400 mg Oral Daily    multivitamin  1 tablet Oral Daily    pantoprazole  40 mg Oral Before breakfast    potassium chloride  40 mEq Oral BID     PRN Meds:acetaminophen, dextrose 10%, dextrose 10%, gabapentin, glucagon (human recombinant), glucose, glucose, heparin (PORCINE), heparin (PORCINE), insulin aspart U-100    Review of patient's allergies indicates:  No Known Allergies  Objective:     Vital Signs (Most Recent):  Temp: 97.4 °F (36.3 °C) (11/11/23 0830)  Pulse: 91 (11/11/23 1116)  Resp: 18 (11/11/23 0830)  BP: 109/66 (11/11/23 0830)  SpO2: 100 % (11/11/23 0830) Vital Signs (24h Range):  Temp:  [97.4 °F (36.3 °C)-98.4 °F (36.9 °C)] 97.4 °F (36.3 °C)  Pulse:  [77-93] 91  Resp:  [18-20] 18  SpO2:  [94 %-100 %] 100 %  BP: ()/(56-66) 109/66     Patient Vitals for the past 72 hrs (Last 3  readings):   Weight   11/11/23 0830 77.8 kg (171 lb 8.3 oz)   11/10/23 1007 80.2 kg (176 lb 12.9 oz)   11/10/23 0030 80.2 kg (176 lb 12.9 oz)     Body mass index is 23.26 kg/m².      Intake/Output Summary (Last 24 hours) at 11/11/2023 1159  Last data filed at 11/11/2023 0847  Gross per 24 hour   Intake 1076.66 ml   Output 1825 ml   Net -748.34 ml       Hemodynamic Parameters:  CVP:  [8 mmHg] 8 mmHg         Physical Exam  Constitutional:       Comments: Alert, Oriented, No acute distress   HENT:      Head: Normocephalic and atraumatic.   Eyes:      Conjunctiva/sclera: Conjunctivae normal.   Neck:      Vascular: No hepatojugular reflux or JVD.      Comments: JVP at clavicle at 45 degrees.   Cardiovascular:      Rate and Rhythm: Normal rate and regular rhythm.   Pulmonary:      Comments: Normal effort, Clear to auscultation   Abdominal:      Comments: Soft, Non-tender, Non-distended   Musculoskeletal:      Cervical back: Normal range of motion.      Comments: No peripheral edema   Skin:     Comments: Dry, Intact, Warm, Capillary refill <2 seconds.    Neurological:      Mental Status: He is alert and oriented to person, place, and time.      Comments: Normal speech   Psychiatric:         Mood and Affect: Mood and affect normal.            Significant Labs:  CBC:  Recent Labs   Lab 11/09/23  0656 11/10/23  0435 11/11/23  0508   WBC 3.55* 3.93 3.26*   RBC 2.96* 2.64* 2.70*   HGB 8.9* 8.1* 8.0*   HCT 26.6* 23.4* 24.4*    228 233   MCV 90 89 90   MCH 30.1 30.7 29.6   MCHC 33.5 34.6 32.8     BNP:  Recent Labs   Lab 11/07/23  1030 11/09/23  0656   BNP 1,113* 1,204*     CMP:  Recent Labs   Lab 11/09/23  0656 11/09/23  1607 11/10/23  0435 11/10/23  1816 11/11/23  0508   *   < > 167* 116* 129*   CALCIUM 9.5   < > 8.8 9.2 9.2   ALBUMIN 3.7  --   --  3.8  --    PROT 7.5  --   --  7.3  --    *   < > 139 138 138   K 4.9   < > 4.1 4.8 4.3   CO2 21*   < > 20* 21* 23      < > 104 103 105   BUN 57*   < > 57*  57* 55*   CREATININE 2.6*   < > 2.1* 2.5* 2.4*   ALKPHOS 85  --   --  89  --    ALT 42  --   --  37  --    AST 26  --   --  19  --    BILITOT 0.8  --   --  1.0  --     < > = values in this interval not displayed.      Coagulation:   Recent Labs   Lab 11/11/23  0958   INR 1.2   APTT 31.3     LDH:  Recent Labs   Lab 11/10/23  1814   *     Microbiology:  Microbiology Results (last 7 days)       ** No results found for the last 168 hours. **            I have reviewed all pertinent labs within the past 24 hours.    Estimated Creatinine Clearance: 35.5 mL/min (A) (based on SCr of 2.4 mg/dL (H)).    Diagnostic Results:  I have reviewed all pertinent imaging results/findings within the past 24 hours.  Assessment and Plan:     63 year old male with hx of CAD s/p 3v CABG (unclear anatomy, 2009), ICM with a recent EF of 15-20% s/p ICD (medtronik 2009), DM2 (a1c 7.7), HTN, HLD, Vfib on amio who presents to the ED with CC of SOB     Pt was recently admitted to [facility] as a transfer.  He was started on a Lasix gtt and did well.  Started on gDMT and discharged home on  5 with plans to follow up in HTS clinic for ongoing transplant evaluation at another facility.  He says that about a few days ago he started to notice LE swelling.  This turned into Nelson and orthopnea.  He says he can't walk to the bathroom without getting SOB.  He also complains of weight gain.  He takes torsemide 20mg BID at home and was told to trial additional Lasix which he did without any improvement.  He was rx metolazone but has not been filled.  He came to the ED     In the ED he was AF with stable VS on RA.  CBC showing chronic anemia.  CMP notable for acute on chronic CKD with baseline around 2.1 and Cr now 2.6.  BNP elevated.  Lactate neg.  CXR showing pulmonary edema.  I evaluated the pt at the bedside.  Bedside TTE showing CVP >15.  He was subsequently admitted to the CCU for diuresis.     He was continued on his home  and was started on a  lasix gtt, which he responded well to overnight (net -1700cc. He feels much better this morning as well. Our transplant coordinators have been working on insurance approval and he is now being transferred to [facility] service for transplant evaluation.     * Acute on chronic combined systolic and diastolic CHF, NYHA class 4  Pt with known ICM with an EF of 25% on home  presenting with decompensated HF.  Not in cardiogenic shock  - Home  gtt at 5  - Decreased lasix 30gtt to 20gtt.   - hold home GDMT: entresto 24/26mg BID  - hold home diuretics: torsemide 20mg BID and metolazone 2.5mg PRN  - will initiate TXP evaluation    PAF (paroxysmal atrial fibrillation)  Known hx of pAF. In sinus rhythm on admission, but 1 run of AF RVR overnight. He spontaneously converted.  - Continue home amiodarone 400mg qd  - Continue home Eliquis  - Continue home protonix    IVÁN (acute kidney injury)  IVÁN on CKD2. Baseline Cr 2.1  - Trend with diuresis  - Hold ARNi    Type 2 diabetes mellitus without complication, without long-term current use of insulin  A1c 7.6, not insulin dependent  - MDSSI  - Consider endocrine if uncontrolled    CAD (coronary artery disease)  S/p 3vCABG in 2009  - continue home ASA, HI statin    Ventricular tachycardia  Hx VT s/p ICD placement (medtronic 2009)  - Continue home amio 400mg qd        Angela Shen MD  Heart Transplant  [facility] - Cardiology Stepdown

## 2023-11-11 NOTE — SUBJECTIVE & OBJECTIVE
Interval History: Yesterday evening patient complained of increased abdominal bloating and SOB. LA was normal and JVP was around 13cm. Increased lasix to 30gtt. This morning patient states he feels much better. No SOB on exertion, no orthopnea, and no abdominal bloating. JVP at clavicle. Will look to titrate down lasix gtt.     CVP 8  SVO2 40  CO/CI 4.71/ 2.36  SVR 1228  Continuous Infusions:   DOBUTamine 5 mcg/kg/min (11/11/23 0509)    furosemide (LASIX) 500 mg in 50 mL infusion (conc: 10 mg/mL) 30 mg/hr (11/11/23 0509)    heparin (porcine) in D5W 12 Units/kg/hr (11/11/23 1000)     Scheduled Meds:   amiodarone  400 mg Oral Daily    aspirin  81 mg Oral Daily    atorvastatin  40 mg Oral Daily    insulin aspart U-100  4 Units Subcutaneous TIDWM    magnesium oxide  400 mg Oral Daily    multivitamin  1 tablet Oral Daily    pantoprazole  40 mg Oral Before breakfast    potassium chloride  40 mEq Oral BID     PRN Meds:acetaminophen, dextrose 10%, dextrose 10%, gabapentin, glucagon (human recombinant), glucose, glucose, heparin (PORCINE), heparin (PORCINE), insulin aspart U-100    Review of patient's allergies indicates:  No Known Allergies  Objective:     Vital Signs (Most Recent):  Temp: 97.4 °F (36.3 °C) (11/11/23 0830)  Pulse: 91 (11/11/23 1116)  Resp: 18 (11/11/23 0830)  BP: 109/66 (11/11/23 0830)  SpO2: 100 % (11/11/23 0830) Vital Signs (24h Range):  Temp:  [97.4 °F (36.3 °C)-98.4 °F (36.9 °C)] 97.4 °F (36.3 °C)  Pulse:  [77-93] 91  Resp:  [18-20] 18  SpO2:  [94 %-100 %] 100 %  BP: ()/(56-66) 109/66     Patient Vitals for the past 72 hrs (Last 3 readings):   Weight   11/11/23 0830 77.8 kg (171 lb 8.3 oz)   11/10/23 1007 80.2 kg (176 lb 12.9 oz)   11/10/23 0030 80.2 kg (176 lb 12.9 oz)     Body mass index is 23.26 kg/m².      Intake/Output Summary (Last 24 hours) at 11/11/2023 1159  Last data filed at 11/11/2023 0847  Gross per 24 hour   Intake 1076.66 ml   Output 1825 ml   Net -748.34 ml       Hemodynamic  Parameters:  CVP:  [8 mmHg] 8 mmHg         Physical Exam  Constitutional:       Comments: Alert, Oriented, No acute distress   HENT:      Head: Normocephalic and atraumatic.   Eyes:      Conjunctiva/sclera: Conjunctivae normal.   Neck:      Vascular: No hepatojugular reflux or JVD.      Comments: JVP at clavicle at 45 degrees.   Cardiovascular:      Rate and Rhythm: Normal rate and regular rhythm.   Pulmonary:      Comments: Normal effort, Clear to auscultation   Abdominal:      Comments: Soft, Non-tender, Non-distended   Musculoskeletal:      Cervical back: Normal range of motion.      Comments: No peripheral edema   Skin:     Comments: Dry, Intact, Warm, Capillary refill <2 seconds.    Neurological:      Mental Status: He is alert and oriented to person, place, and time.      Comments: Normal speech   Psychiatric:         Mood and Affect: Mood and affect normal.            Significant Labs:  CBC:  Recent Labs   Lab 11/09/23  0656 11/10/23  0435 11/11/23  0508   WBC 3.55* 3.93 3.26*   RBC 2.96* 2.64* 2.70*   HGB 8.9* 8.1* 8.0*   HCT 26.6* 23.4* 24.4*    228 233   MCV 90 89 90   MCH 30.1 30.7 29.6   MCHC 33.5 34.6 32.8     BNP:  Recent Labs   Lab 11/07/23  1030 11/09/23  0656   BNP 1,113* 1,204*     CMP:  Recent Labs   Lab 11/09/23  0656 11/09/23  1607 11/10/23  0435 11/10/23  1816 11/11/23  0508   *   < > 167* 116* 129*   CALCIUM 9.5   < > 8.8 9.2 9.2   ALBUMIN 3.7  --   --  3.8  --    PROT 7.5  --   --  7.3  --    *   < > 139 138 138   K 4.9   < > 4.1 4.8 4.3   CO2 21*   < > 20* 21* 23      < > 104 103 105   BUN 57*   < > 57* 57* 55*   CREATININE 2.6*   < > 2.1* 2.5* 2.4*   ALKPHOS 85  --   --  89  --    ALT 42  --   --  37  --    AST 26  --   --  19  --    BILITOT 0.8  --   --  1.0  --     < > = values in this interval not displayed.      Coagulation:   Recent Labs   Lab 11/11/23  0958   INR 1.2   APTT 31.3     LDH:  Recent Labs   Lab 11/10/23  1814   *      Microbiology:  Microbiology Results (last 7 days)       ** No results found for the last 168 hours. **            I have reviewed all pertinent labs within the past 24 hours.    Estimated Creatinine Clearance: 35.5 mL/min (A) (based on SCr of 2.4 mg/dL (H)).    Diagnostic Results:  I have reviewed all pertinent imaging results/findings within the past 24 hours.

## 2023-11-11 NOTE — NURSING
AAOX4,VSS,O2 sats > 95% on RA. Plan of care discussed with patient. Patient has no complaints of chest pain/SOB/palpitations.  and lasix going. Pt ambulating independent, fall precautions in place,no falls/injuries through the shift.Discussed medications and care.Patient has no questions at this time.Pt resting comfortably with no acute distress.Call light within reach,bed at lowest position.

## 2023-11-11 NOTE — PLAN OF CARE
Problem: Physical Therapy  Goal: Physical Therapy Goal  Description: Goals to be met by: 23     Patient will increase functional independence with mobility by performin. Evaluate pt's need for physical therapy.     Outcome: Met   Evaluation completed and goals are met 2023

## 2023-11-11 NOTE — ASSESSMENT & PLAN NOTE
BG goal 140-180    Continue  Novolog 4 units TID with meals (0.3 u/kg dosing)   Low Dose Correction Scale (given kidney function)  BG monitoring ac/hs    ** Please call Endocrine for any BG related issues **    Discharge plans: TBD    Lab Results   Component Value Date    HGBA1C 7.6 (H) 10/12/2023

## 2023-11-11 NOTE — PLAN OF CARE
Problem: Violence Risk or Actual  Goal: Anger and Impulse Control  Outcome: Ongoing, Progressing     Problem: Adult Inpatient Plan of Care  Goal: Plan of Care Review  Outcome: Ongoing, Progressing  Goal: Absence of Hospital-Acquired Illness or Injury  Outcome: Ongoing, Progressing  Goal: Optimal Comfort and Wellbeing  Outcome: Ongoing, Progressing  Goal: Readiness for Transition of Care  Outcome: Ongoing, Progressing     Problem: Diabetes Comorbidity  Goal: Blood Glucose Level Within Targeted Range  Outcome: Ongoing, Progressing     Problem: Fluid and Electrolyte Imbalance (Acute Kidney Injury/Impairment)  Goal: Fluid and Electrolyte Balance  Outcome: Ongoing, Progressing     Problem: Oral Intake Inadequate (Acute Kidney Injury/Impairment)  Goal: Optimal Nutrition Intake  Outcome: Ongoing, Progressing     Problem: Renal Function Impairment (Acute Kidney Injury/Impairment)  Goal: Effective Renal Function  Outcome: Ongoing, Progressing     Problem: Infection  Goal: Absence of Infection Signs and Symptoms  Outcome: Ongoing, Progressing     Problem: Adjustment to Illness (Heart Failure)  Goal: Optimal Coping  Outcome: Ongoing, Progressing     Problem: Cardiac Output Decreased (Heart Failure)  Goal: Optimal Cardiac Output  Outcome: Ongoing, Progressing     Problem: Dysrhythmia (Heart Failure)  Goal: Stable Heart Rate and Rhythm  Outcome: Ongoing, Progressing     Problem: Fluid Imbalance (Heart Failure)  Goal: Fluid Balance  Outcome: Ongoing, Progressing     Problem: Functional Ability Impaired (Heart Failure)  Goal: Optimal Functional Ability  Outcome: Ongoing, Progressing     Problem: Oral Intake Inadequate (Heart Failure)  Goal: Optimal Nutrition Intake  Outcome: Ongoing, Progressing     Problem: Respiratory Compromise (Heart Failure)  Goal: Effective Oxygenation and Ventilation  Outcome: Ongoing, Progressing     Problem: Sleep Disordered Breathing (Heart Failure)  Goal: Effective Breathing Pattern During  Sleep  Outcome: Ongoing, Progressing     Problem: Cardiac Output Decreased  Goal: Effective Cardiac Output  Outcome: Ongoing, Progressing

## 2023-11-12 PROBLEM — I48.91 ATRIAL FIBRILLATION WITH TACHYCARDIC VENTRICULAR RATE: Status: RESOLVED | Noted: 2023-10-15 | Resolved: 2023-11-12

## 2023-11-12 LAB
ALLENS TEST: ABNORMAL
ANION GAP SERPL CALC-SCNC: 11 MMOL/L (ref 8–16)
ANION GAP SERPL CALC-SCNC: 11 MMOL/L (ref 8–16)
APTT PPP: 47.4 SEC (ref 21–32)
APTT PPP: 51.8 SEC (ref 21–32)
BASOPHILS # BLD AUTO: 0.03 K/UL (ref 0–0.2)
BASOPHILS NFR BLD: 0.8 % (ref 0–1.9)
BUN SERPL-MCNC: 56 MG/DL (ref 8–23)
BUN SERPL-MCNC: 58 MG/DL (ref 8–23)
CALCIUM SERPL-MCNC: 8.7 MG/DL (ref 8.7–10.5)
CALCIUM SERPL-MCNC: 9.2 MG/DL (ref 8.7–10.5)
CHLORIDE SERPL-SCNC: 103 MMOL/L (ref 95–110)
CHLORIDE SERPL-SCNC: 104 MMOL/L (ref 95–110)
CO2 SERPL-SCNC: 19 MMOL/L (ref 23–29)
CO2 SERPL-SCNC: 23 MMOL/L (ref 23–29)
CREAT SERPL-MCNC: 2.7 MG/DL (ref 0.5–1.4)
CREAT SERPL-MCNC: 2.9 MG/DL (ref 0.5–1.4)
DELSYS: ABNORMAL
DIFFERENTIAL METHOD BLD: ABNORMAL
EOSINOPHIL # BLD AUTO: 0.1 K/UL (ref 0–0.5)
EOSINOPHIL NFR BLD: 3 % (ref 0–8)
ERYTHROCYTE [DISTWIDTH] IN BLOOD BY AUTOMATED COUNT: 14.2 % (ref 11.5–14.5)
EST. GFR  (NO RACE VARIABLE): 23.9 ML/MIN/1.73 M^2
EST. GFR  (NO RACE VARIABLE): 26 ML/MIN/1.73 M^2
GLUCOSE SERPL-MCNC: 123 MG/DL (ref 70–110)
GLUCOSE SERPL-MCNC: 234 MG/DL (ref 70–110)
HCO3 UR-SCNC: 24 MMOL/L (ref 24–28)
HCT VFR BLD AUTO: 23.8 % (ref 40–54)
HGB BLD-MCNC: 7.7 G/DL (ref 14–18)
IMM GRANULOCYTES # BLD AUTO: 0.01 K/UL (ref 0–0.04)
IMM GRANULOCYTES NFR BLD AUTO: 0.3 % (ref 0–0.5)
LACTATE SERPL-SCNC: 1.7 MMOL/L (ref 0.5–2.2)
LYMPHOCYTES # BLD AUTO: 1 K/UL (ref 1–4.8)
LYMPHOCYTES NFR BLD: 28.3 % (ref 18–48)
MAGNESIUM SERPL-MCNC: 2.3 MG/DL (ref 1.6–2.6)
MAGNESIUM SERPL-MCNC: 2.3 MG/DL (ref 1.6–2.6)
MCH RBC QN AUTO: 29.5 PG (ref 27–31)
MCHC RBC AUTO-ENTMCNC: 32.4 G/DL (ref 32–36)
MCV RBC AUTO: 91 FL (ref 82–98)
MODE: ABNORMAL
MONOCYTES # BLD AUTO: 0.3 K/UL (ref 0.3–1)
MONOCYTES NFR BLD: 7.2 % (ref 4–15)
NEUTROPHILS # BLD AUTO: 2.2 K/UL (ref 1.8–7.7)
NEUTROPHILS NFR BLD: 60.4 % (ref 38–73)
NRBC BLD-RTO: 0 /100 WBC
PCO2 BLDA: 36.7 MMHG (ref 35–45)
PH SMN: 7.42 [PH] (ref 7.35–7.45)
PHOSPHATE SERPL-MCNC: 4.4 MG/DL (ref 2.7–4.5)
PLATELET # BLD AUTO: 230 K/UL (ref 150–450)
PMV BLD AUTO: 11.2 FL (ref 9.2–12.9)
PO2 BLDA: 24 MMHG (ref 40–60)
POC BE: 0 MMOL/L
POC SATURATED O2: 45 % (ref 95–100)
POC TCO2: 25 MMOL/L (ref 24–29)
POCT GLUCOSE: 148 MG/DL (ref 70–110)
POCT GLUCOSE: 161 MG/DL (ref 70–110)
POCT GLUCOSE: 230 MG/DL (ref 70–110)
POCT GLUCOSE: 288 MG/DL (ref 70–110)
POTASSIUM SERPL-SCNC: 4.3 MMOL/L (ref 3.5–5.1)
POTASSIUM SERPL-SCNC: 4.9 MMOL/L (ref 3.5–5.1)
RBC # BLD AUTO: 2.61 M/UL (ref 4.6–6.2)
SAMPLE: ABNORMAL
SITE: ABNORMAL
SODIUM SERPL-SCNC: 134 MMOL/L (ref 136–145)
SODIUM SERPL-SCNC: 137 MMOL/L (ref 136–145)
WBC # BLD AUTO: 3.61 K/UL (ref 3.9–12.7)

## 2023-11-12 PROCEDURE — 25000003 PHARM REV CODE 250: Mod: NTX | Performed by: STUDENT IN AN ORGANIZED HEALTH CARE EDUCATION/TRAINING PROGRAM

## 2023-11-12 PROCEDURE — 63600175 PHARM REV CODE 636 W HCPCS: Mod: NTX | Performed by: STUDENT IN AN ORGANIZED HEALTH CARE EDUCATION/TRAINING PROGRAM

## 2023-11-12 PROCEDURE — 85730 THROMBOPLASTIN TIME PARTIAL: CPT | Mod: 91,NTX | Performed by: STUDENT IN AN ORGANIZED HEALTH CARE EDUCATION/TRAINING PROGRAM

## 2023-11-12 PROCEDURE — 99900035 HC TECH TIME PER 15 MIN (STAT): Mod: NTX

## 2023-11-12 PROCEDURE — 83735 ASSAY OF MAGNESIUM: CPT | Mod: 91,NTX | Performed by: STUDENT IN AN ORGANIZED HEALTH CARE EDUCATION/TRAINING PROGRAM

## 2023-11-12 PROCEDURE — 83735 ASSAY OF MAGNESIUM: CPT | Mod: NTX | Performed by: STUDENT IN AN ORGANIZED HEALTH CARE EDUCATION/TRAINING PROGRAM

## 2023-11-12 PROCEDURE — 85025 COMPLETE CBC W/AUTO DIFF WBC: CPT | Mod: NTX | Performed by: STUDENT IN AN ORGANIZED HEALTH CARE EDUCATION/TRAINING PROGRAM

## 2023-11-12 PROCEDURE — 80048 BASIC METABOLIC PNL TOTAL CA: CPT | Mod: 91,NTX | Performed by: STUDENT IN AN ORGANIZED HEALTH CARE EDUCATION/TRAINING PROGRAM

## 2023-11-12 PROCEDURE — 80048 BASIC METABOLIC PNL TOTAL CA: CPT | Mod: NTX | Performed by: STUDENT IN AN ORGANIZED HEALTH CARE EDUCATION/TRAINING PROGRAM

## 2023-11-12 PROCEDURE — 94761 N-INVAS EAR/PLS OXIMETRY MLT: CPT | Mod: NTX

## 2023-11-12 PROCEDURE — 84100 ASSAY OF PHOSPHORUS: CPT | Mod: NTX | Performed by: STUDENT IN AN ORGANIZED HEALTH CARE EDUCATION/TRAINING PROGRAM

## 2023-11-12 PROCEDURE — 85730 THROMBOPLASTIN TIME PARTIAL: CPT | Mod: NTX | Performed by: INTERNAL MEDICINE

## 2023-11-12 PROCEDURE — 25000003 PHARM REV CODE 250: Mod: NTX | Performed by: PHYSICIAN ASSISTANT

## 2023-11-12 PROCEDURE — 20600001 HC STEP DOWN PRIVATE ROOM: Mod: NTX

## 2023-11-12 PROCEDURE — 83605 ASSAY OF LACTIC ACID: CPT | Mod: NTX | Performed by: STUDENT IN AN ORGANIZED HEALTH CARE EDUCATION/TRAINING PROGRAM

## 2023-11-12 PROCEDURE — 99232 SBSQ HOSP IP/OBS MODERATE 35: CPT | Mod: NTX,,, | Performed by: NURSE PRACTITIONER

## 2023-11-12 RX ORDER — HYDRALAZINE HYDROCHLORIDE 25 MG/1
25 TABLET, FILM COATED ORAL EVERY 8 HOURS
Status: DISCONTINUED | OUTPATIENT
Start: 2023-11-12 | End: 2023-11-28

## 2023-11-12 RX ORDER — INSULIN ASPART 100 [IU]/ML
6 INJECTION, SOLUTION INTRAVENOUS; SUBCUTANEOUS
Status: DISCONTINUED | OUTPATIENT
Start: 2023-11-12 | End: 2023-11-13

## 2023-11-12 RX ADMIN — INSULIN ASPART 6 UNITS: 100 INJECTION, SOLUTION INTRAVENOUS; SUBCUTANEOUS at 01:11

## 2023-11-12 RX ADMIN — GABAPENTIN 300 MG: 300 CAPSULE ORAL at 08:11

## 2023-11-12 RX ADMIN — FUROSEMIDE 20 MG/HR: 10 INJECTION, SOLUTION INTRAMUSCULAR; INTRAVENOUS at 11:11

## 2023-11-12 RX ADMIN — ATORVASTATIN CALCIUM 40 MG: 40 TABLET, FILM COATED ORAL at 08:11

## 2023-11-12 RX ADMIN — HEPARIN SODIUM AND DEXTROSE 14 UNITS/KG/HR: 10000; 5 INJECTION INTRAVENOUS at 05:11

## 2023-11-12 RX ADMIN — GABAPENTIN 300 MG: 300 CAPSULE ORAL at 12:11

## 2023-11-12 RX ADMIN — POTASSIUM CHLORIDE 40 MEQ: 1500 TABLET, EXTENDED RELEASE ORAL at 08:11

## 2023-11-12 RX ADMIN — ACETAMINOPHEN 650 MG: 325 TABLET ORAL at 08:11

## 2023-11-12 RX ADMIN — INSULIN ASPART 1 UNITS: 100 INJECTION, SOLUTION INTRAVENOUS; SUBCUTANEOUS at 08:11

## 2023-11-12 RX ADMIN — INSULIN ASPART 3 UNITS: 100 INJECTION, SOLUTION INTRAVENOUS; SUBCUTANEOUS at 01:11

## 2023-11-12 RX ADMIN — AMIODARONE HYDROCHLORIDE 400 MG: 200 TABLET ORAL at 08:11

## 2023-11-12 RX ADMIN — THERA TABS 1 TABLET: TAB at 08:11

## 2023-11-12 RX ADMIN — ASPIRIN 81 MG: 81 TABLET, COATED ORAL at 08:11

## 2023-11-12 RX ADMIN — Medication 400 MG: at 08:11

## 2023-11-12 RX ADMIN — INSULIN ASPART 6 UNITS: 100 INJECTION, SOLUTION INTRAVENOUS; SUBCUTANEOUS at 05:11

## 2023-11-12 RX ADMIN — INSULIN ASPART 4 UNITS: 100 INJECTION, SOLUTION INTRAVENOUS; SUBCUTANEOUS at 08:11

## 2023-11-12 RX ADMIN — FUROSEMIDE 20 MG/HR: 10 INJECTION, SOLUTION INTRAMUSCULAR; INTRAVENOUS at 12:11

## 2023-11-12 RX ADMIN — PANTOPRAZOLE SODIUM 40 MG: 40 TABLET, DELAYED RELEASE ORAL at 06:11

## 2023-11-12 NOTE — NURSING
APTT @ 12 MN= 51.8. Under therapeutic range. No change made to the heparin drip. Heparin drip contd @ 14 U/kg/hr with a dosing weight of 78.6kg  which corresponds to 11ml/hr same as before. Will send next APTT @ 6 AM.

## 2023-11-12 NOTE — SUBJECTIVE & OBJECTIVE
Interval History: After decreasing lasix to 20gtt yesterday morning, patient endorsed worsening SOB and abdominal bloating in the afternoon. CVP was noted be 11 despite patient appearing to be euvolemic on exam. Gave 1 dose of IV lasix 80mg . This morning patient symptoms resolved.    CVP 9  SVO2 45  CO/CI 4.81/ 2.42  SVR 1047  Continuous Infusions:   DOBUTamine 5 mcg/kg/min (11/11/23 0509)    furosemide (LASIX) 500 mg in 50 mL infusion (conc: 10 mg/mL) 20 mg/hr (11/12/23 0002)    heparin (porcine) in D5W 14 Units/kg/hr (11/12/23 0504)     Scheduled Meds:   amiodarone  400 mg Oral Daily    aspirin  81 mg Oral Daily    atorvastatin  40 mg Oral Daily    hydrALAZINE  25 mg Oral Q8H    insulin aspart U-100  6 Units Subcutaneous TIDWM    magnesium oxide  400 mg Oral Daily    multivitamin  1 tablet Oral Daily    pantoprazole  40 mg Oral Before breakfast    potassium chloride  40 mEq Oral BID     PRN Meds:acetaminophen, dextrose 10%, dextrose 10%, gabapentin, glucagon (human recombinant), glucose, glucose, heparin (PORCINE), heparin (PORCINE), insulin aspart U-100    Review of patient's allergies indicates:  No Known Allergies  Objective:     Vital Signs (Most Recent):  Temp: 98.1 °F (36.7 °C) (11/12/23 1138)  Pulse: 86 (11/12/23 1436)  Resp: 20 (11/12/23 1138)  BP: (!) 96/57 (11/12/23 1138)  SpO2: 100 % (11/12/23 1138) Vital Signs (24h Range):  Temp:  [98 °F (36.7 °C)-98.5 °F (36.9 °C)] 98.1 °F (36.7 °C)  Pulse:  [80-88] 86  Resp:  [18-20] 20  SpO2:  [97 %-100 %] 100 %  BP: ()/(57-65) 96/57     Patient Vitals for the past 72 hrs (Last 3 readings):   Weight   11/12/23 0905 78.1 kg (172 lb 2.9 oz)   11/12/23 0615 78.1 kg (172 lb 2.9 oz)   11/11/23 0830 77.8 kg (171 lb 8.3 oz)       Body mass index is 23.35 kg/m².      Intake/Output Summary (Last 24 hours) at 11/12/2023 1541  Last data filed at 11/12/2023 0826  Gross per 24 hour   Intake 880 ml   Output 1040 ml   Net -160 ml         Hemodynamic Parameters:  CVP:  [9  mmHg-11 mmHg] 9 mmHg         Physical Exam  Constitutional:       Comments: Alert, Oriented, No acute distress   HENT:      Head: Normocephalic and atraumatic.   Eyes:      Conjunctiva/sclera: Conjunctivae normal.   Neck:      Vascular: No hepatojugular reflux or JVD.      Comments: JVP at clavicle at 45 degrees.   Cardiovascular:      Rate and Rhythm: Normal rate and regular rhythm.   Pulmonary:      Comments: Normal effort, Clear to auscultation   Abdominal:      Comments: Soft, Non-tender, Non-distended   Musculoskeletal:      Cervical back: Normal range of motion.      Comments: No peripheral edema   Skin:     Comments: Dry, Intact, Warm, Capillary refill <2 seconds.    Neurological:      Mental Status: He is alert and oriented to person, place, and time.      Comments: Normal speech   Psychiatric:         Mood and Affect: Mood and affect normal.            Significant Labs:  CBC:  Recent Labs   Lab 11/10/23  0435 11/11/23  0508 11/12/23  0603   WBC 3.93 3.26* 3.61*   RBC 2.64* 2.70* 2.61*   HGB 8.1* 8.0* 7.7*   HCT 23.4* 24.4* 23.8*    233 230   MCV 89 90 91   MCH 30.7 29.6 29.5   MCHC 34.6 32.8 32.4       BNP:  Recent Labs   Lab 11/07/23  1030 11/09/23  0656   BNP 1,113* 1,204*       CMP:  Recent Labs   Lab 11/09/23  0656 11/09/23  1607 11/10/23  1816 11/11/23  0508 11/12/23  0603   *   < > 116* 129* 123*   CALCIUM 9.5   < > 9.2 9.2 9.2   ALBUMIN 3.7  --  3.8  --   --    PROT 7.5  --  7.3  --   --    *   < > 138 138 137   K 4.9   < > 4.8 4.3 4.3   CO2 21*   < > 21* 23 23      < > 103 105 103   BUN 57*   < > 57* 55* 56*   CREATININE 2.6*   < > 2.5* 2.4* 2.7*   ALKPHOS 85  --  89  --   --    ALT 42  --  37  --   --    AST 26  --  19  --   --    BILITOT 0.8  --  1.0  --   --     < > = values in this interval not displayed.        Coagulation:   Recent Labs   Lab 11/11/23  0958 11/11/23  1600 11/12/23  0003 11/12/23  0603   INR 1.2  --   --   --    APTT 31.3 37.8* 51.8* 47.4*        LDH:  Recent Labs   Lab 11/10/23  1814   *       Microbiology:  Microbiology Results (last 7 days)       ** No results found for the last 168 hours. **            I have reviewed all pertinent labs within the past 24 hours.    Estimated Creatinine Clearance: 31.5 mL/min (A) (based on SCr of 2.7 mg/dL (H)).    Diagnostic Results:  I have reviewed all pertinent imaging results/findings within the past 24 hours.

## 2023-11-12 NOTE — ASSESSMENT & PLAN NOTE
Lab Results   Component Value Date    CREATININE 2.7 (H) 11/12/2023     Avoid insulin stacking  Titrate insulin slowly

## 2023-11-12 NOTE — PLAN OF CARE
Problem: Adult Inpatient Plan of Care  Goal: Plan of Care Review  Outcome: Ongoing, Progressing  Goal: Absence of Hospital-Acquired Illness or Injury  Outcome: Ongoing, Progressing  Goal: Optimal Comfort and Wellbeing  Outcome: Ongoing, Progressing     Problem: Diabetes Comorbidity  Goal: Blood Glucose Level Within Targeted Range  Outcome: Ongoing, Progressing  Intervention: Monitor and Manage Glycemia  Flowsheets (Taken 11/12/2023 1546)  Glycemic Management:   supplemental insulin given   blood glucose monitored     Problem: Adjustment to Illness (Heart Failure)  Goal: Optimal Coping  Outcome: Ongoing, Progressing     Problem: Cardiac Output Decreased (Heart Failure)  Goal: Optimal Cardiac Output  Outcome: Ongoing, Progressing  Intervention: Optimize Cardiac Output  Flowsheets (Taken 11/12/2023 1545)  Environmental Support:   calm environment promoted   personal routine supported   distractions minimized   rest periods encouraged     Problem: Fluid Imbalance (Heart Failure)  Goal: Fluid Balance  Outcome: Ongoing, Progressing  Intervention: Monitor and Manage Fluid Balance  Flowsheets (Taken 11/12/2023 1546)  Fluid/Electrolyte Management: fluids restricted         AAOX4,VSS,O2 sats > 95% on RA. Plan of care discussed with patient. Patient has no complaints of chest pain/SOB/palpitations. , Heparin and lasix going as per order. Pt ambulating independent, fall precautions in place,no falls/injuries through the shift.Discussed medications and care.Patient has no questions at this time.Pt resting comfortably with no acute distress.Call light within reach,bed at lowest position.

## 2023-11-12 NOTE — PLAN OF CARE
Problem: Adult Inpatient Plan of Care  Goal: Patient-Specific Goal (Individualized)  Outcome: Ongoing, Progressing  Flowsheets (Taken 11/11/2023 1940)  Anxieties, Fears or Concerns: fluid/fullness in belly  Individualized Care Needs: lasix gtt     Problem: Diabetes Comorbidity  Goal: Blood Glucose Level Within Targeted Range  Outcome: Ongoing, Progressing  Intervention: Monitor and Manage Glycemia  Flowsheets (Taken 11/11/2023 1940)  Glycemic Management:   blood glucose monitored   supplemental insulin given     Problem: Cardiac Output Decreased (Heart Failure)  Goal: Optimal Cardiac Output  Outcome: Ongoing, Progressing  Intervention: Optimize Cardiac Output  Flowsheets (Taken 11/11/2023 1940)  Environmental Support:   calm environment promoted   rest periods encouraged     Problem: Fluid Imbalance (Heart Failure)  Goal: Fluid Balance  Outcome: Ongoing, Progressing  Intervention: Monitor and Manage Fluid Balance  Flowsheets (Taken 11/11/2023 1940)  Fluid/Electrolyte Management: fluids restricted       Plan of care discussed with patient. Patient is free of fall/trauma/injury. Denies CP, SOB, or pain/discomfort. Lasix, Heparin and  gtts infusing per order. BG monitored, supplemental insulin administered. All questions addressed.

## 2023-11-12 NOTE — SUBJECTIVE & OBJECTIVE
"Interval HPI:   Overnight events: Remains in CSU. BG mostly above goal ranges on current SQ insulin regimen. Creatinine 2.7. Diet diabetic Cardiac (Low Na/Chol);  Calorie    Eatin%  Nausea: No  Hypoglycemia and intervention: No  Fever: No  TPN and/or TF: No  If yes, type of TF/TPN and rate: n/a    /65 (BP Location: Right arm, Patient Position: Lying)   Pulse 84   Temp 98.2 °F (36.8 °C) (Oral)   Resp 20   Ht 6' (1.829 m)   Wt 78.1 kg (172 lb 2.9 oz)   SpO2 99%   BMI 23.35 kg/m²     Labs Reviewed and Include    Recent Labs   Lab 23  0603   *   CALCIUM 9.2      K 4.3   CO2 23      BUN 56*   CREATININE 2.7*     Lab Results   Component Value Date    WBC 3.61 (L) 2023    HGB 7.7 (L) 2023    HCT 23.8 (L) 2023    MCV 91 2023     2023     No results for input(s): "TSH", "FREET4" in the last 168 hours.  Lab Results   Component Value Date    HGBA1C 7.6 (H) 10/12/2023       Nutritional status:   Body mass index is 23.35 kg/m².  Lab Results   Component Value Date    ALBUMIN 3.8 11/10/2023    ALBUMIN 3.7 2023    ALBUMIN 3.1 (L) 2023     No results found for: "PREALBUMIN"    Estimated Creatinine Clearance: 31.5 mL/min (A) (based on SCr of 2.7 mg/dL (H)).    Accu-Checks  Recent Labs     23  1212 11/10/23  0728 11/10/23  1308 11/10/23  1653 23  0833 23  1234 23  1758 23  1945 23  0745   POCTGLUCOSE 189* 191* 282* 130* 158* 229* 291* 263* 161*       Current Medications and/or Treatments Impacting Glycemic Control  Immunotherapy:    Immunosuppressants       None          Steroids:   Hormones (From admission, onward)      None          Pressors:    Autonomic Drugs (From admission, onward)      None          Hyperglycemia/Diabetes Medications:   Antihyperglycemics (From admission, onward)      Start     Stop Route Frequency Ordered    23 1130  insulin aspart U-100 pen 6 Units         -- SubQ 3 " times daily with meals 11/12/23 0843    11/10/23 1602  insulin aspart U-100 pen 0-5 Units         -- SubQ Before meals & nightly PRN 11/10/23 1505

## 2023-11-12 NOTE — PLAN OF CARE
- Patient is alert and orientedX4.  - Calm, cooperative. Behaviour appropriate to the situation.  - VSS. Afebrile. Spo2 maintained@RA.  - NSR on Tele.  - Scheduled medicines given as per MAR.  - Accuchmarizol SPARROW HS. See POCT. Corrected with insulin Novolog this shift.  - RUE PICC in situ.  - Inj lasix contd @ 20 mg/hr.  - Inj heparin contd and titrated as per the APTT results.  - Inj dobutamin contd @ 5 mcg/kg/min.  - Serum Cr=2.4.   - CVP and SVO2 monitoring once a shift.  - Patient voids in urinal.  - Wife at bedside. Actively participating in care.  - Bed in low position. Wheels locked. Call light within patient's reach.  - Patient aware of calling for assistance.  - Call light answered in person.  - Will continue to monitor.

## 2023-11-12 NOTE — PROGRESS NOTES
[facility] - [facility] Step[facility]  Endocrinology  Progress Note    Admit Date: 2023     Reason for Consult: Management of T2DM, Hyperglycemia     Surgical Procedure and Date: n/a    Diabetes diagnosis year:     Home Diabetes Medications:  Metformin (off since October)   Lab Results   Component Value Date    HGBA1C 7.6 (H) 10/12/2023       How often checking glucose at home?  Once daily in the AM    BG readings on regimen: 150-160s  Hypoglycemia on the regimen?  No  Missed doses on regimen?  n/a    Diabetes Complications include:     Hyperglycemia    Complicating diabetes co morbidities:   CAD s/p CABG, HTN, HLD      HPI:   Patient is a 63 y.o. male with a diagnosis of CAD s/p 3v CABG (unclear anatomy, ), ICM with a recent EF of 15-20% s/p ICD (medtronik ), DM2 (a1c 7.7), HTN, HLD, Vfib on amio who presents to the ED with CC of SOB. In the ED he was AF with stable VS on RA.  CBC showing chronic anemia.  CMP notable for acute on chronic CKD with baseline around 2.1 and Cr now 2.6.  BNP elevated.  Lactate neg.  CXR showing pulmonary edema. He was subsequently admitted to the CCU for diuresis.  Endocrinology consulted for management of T2DM.            Interval HPI:   Overnight events: Remains in CSU. BG mostly above goal ranges on current SQ insulin regimen. Creatinine 2.7. Diet diabetic Cardiac (Low Na/Chol);  Calorie    Eatin%  Nausea: No  Hypoglycemia and intervention: No  Fever: No  TPN and/or TF: No  If yes, type of TF/TPN and rate: n/a    /65 (BP Location: Right arm, Patient Position: Lying)   Pulse 84   Temp 98.2 °F (36.8 °C) (Oral)   Resp 20   Ht 6' (1.829 m)   Wt 78.1 kg (172 lb 2.9 oz)   SpO2 99%   BMI 23.35 kg/m²     Labs Reviewed and Include    Recent Labs   Lab 23  0603   *   CALCIUM 9.2      K 4.3   CO2 23      BUN 56*   CREATININE 2.7*     Lab Results   Component Value Date    WBC 3.61 (L) 2023    HGB 7.7 (L) 2023    HCT 23.8 (L)  "11/12/2023    MCV 91 11/12/2023     11/12/2023     No results for input(s): "TSH", "FREET4" in the last 168 hours.  Lab Results   Component Value Date    HGBA1C 7.6 (H) 10/12/2023       Nutritional status:   Body mass index is 23.35 kg/m².  Lab Results   Component Value Date    ALBUMIN 3.8 11/10/2023    ALBUMIN 3.7 11/09/2023    ALBUMIN 3.1 (L) 11/01/2023     No results found for: "PREALBUMIN"    Estimated Creatinine Clearance: 31.5 mL/min (A) (based on SCr of 2.7 mg/dL (H)).    Accu-Checks  Recent Labs     11/09/23  1212 11/10/23  0728 11/10/23  1308 11/10/23  1653 11/11/23  0833 11/11/23  1234 11/11/23  1758 11/11/23  1945 11/12/23  0745   POCTGLUCOSE 189* 191* 282* 130* 158* 229* 291* 263* 161*       Current Medications and/or Treatments Impacting Glycemic Control  Immunotherapy:    Immunosuppressants       None          Steroids:   Hormones (From admission, onward)      None          Pressors:    Autonomic Drugs (From admission, onward)      None          Hyperglycemia/Diabetes Medications:   Antihyperglycemics (From admission, onward)      Start     Stop Route Frequency Ordered    11/12/23 1130  insulin aspart U-100 pen 6 Units         -- SubQ 3 times daily with meals 11/12/23 0843    11/10/23 1602  insulin aspart U-100 pen 0-5 Units         -- SubQ Before meals & nightly PRN 11/10/23 1503            ASSESSMENT and PLAN    Cardiac/Vascular  * Acute on chronic combined systolic and diastolic CHF, NYHA class 4  Managed per primary team  Optimize BG control      PAF (paroxysmal atrial fibrillation)  May increase insulin resistance.         Renal/  IVÁN (acute kidney injury)  Lab Results   Component Value Date    CREATININE 2.7 (H) 11/12/2023     Avoid insulin stacking  Titrate insulin slowly       Endocrine  Type 2 diabetes mellitus without complication, without long-term current use of insulin  BG goal 140-180    Increase Novolog to 6 units TID with meals (0.5 u/kg dosing)   Low Dose Correction Scale " (given kidney function)  BG monitoring ac/hs    ** Please call Endocrine for any BG related issues **    Discharge plans: TBD    Lab Results   Component Value Date    HGBA1C 7.6 (H) 10/12/2023               Morena Escobar NP  Endocrinology  [facility] - [facility]

## 2023-11-12 NOTE — PROGRESS NOTES
11/11/23 1714 11/11/23 1811   Invasive Hemodynamic Monitoring   CVP (mmHg) 11 mmHg  --    SVO2 (%)  --  43 %     Dr Shen aware of results.

## 2023-11-12 NOTE — PROGRESS NOTES
[facility] - Cardiology Stepdown  Heart Transplant  Progress Note    Patient Name: Radha Abbott  MRN: [id number]  Admission Date: 11/9/2023  Hospital Length of Stay: 3 days  Attending Physician: Juventino Bermudez Jr.*  Primary Care Provider: Vasu Kong MD  Principal Problem:Acute on chronic combined systolic and diastolic CHF, NYHA class 4    Subjective:     Interval History: After decreasing lasix to 20gtt yesterday morning, patient endorsed worsening SOB and abdominal bloating in the afternoon. CVP was noted be 11 despite patient appearing to be euvolemic on exam. Gave 1 dose of IV lasix 80mg . This morning patient symptoms resolved.    CVP 9  SVO2 45  CO/CI 4.81/ 2.42  SVR 1047  Continuous Infusions:   DOBUTamine 5 mcg/kg/min (11/11/23 0509)    furosemide (LASIX) 500 mg in 50 mL infusion (conc: 10 mg/mL) 20 mg/hr (11/12/23 0002)    heparin (porcine) in D5W 14 Units/kg/hr (11/12/23 0504)     Scheduled Meds:   amiodarone  400 mg Oral Daily    aspirin  81 mg Oral Daily    atorvastatin  40 mg Oral Daily    hydrALAZINE  25 mg Oral Q8H    insulin aspart U-100  6 Units Subcutaneous TIDWM    magnesium oxide  400 mg Oral Daily    multivitamin  1 tablet Oral Daily    pantoprazole  40 mg Oral Before breakfast    potassium chloride  40 mEq Oral BID     PRN Meds:acetaminophen, dextrose 10%, dextrose 10%, gabapentin, glucagon (human recombinant), glucose, glucose, heparin (PORCINE), heparin (PORCINE), insulin aspart U-100    Review of patient's allergies indicates:  No Known Allergies  Objective:     Vital Signs (Most Recent):  Temp: 98.1 °F (36.7 °C) (11/12/23 1138)  Pulse: 86 (11/12/23 1436)  Resp: 20 (11/12/23 1138)  BP: (!) 96/57 (11/12/23 1138)  SpO2: 100 % (11/12/23 1138) Vital Signs (24h Range):  Temp:  [98 °F (36.7 °C)-98.5 °F (36.9 °C)] 98.1 °F (36.7 °C)  Pulse:  [80-88] 86  Resp:  [18-20] 20  SpO2:  [97 %-100 %] 100 %  BP: ()/(57-65) 96/57     Patient Vitals for the past 72 hrs (Last 3  readings):   Weight   11/12/23 0905 78.1 kg (172 lb 2.9 oz)   11/12/23 0615 78.1 kg (172 lb 2.9 oz)   11/11/23 0830 77.8 kg (171 lb 8.3 oz)       Body mass index is 23.35 kg/m².      Intake/Output Summary (Last 24 hours) at 11/12/2023 1541  Last data filed at 11/12/2023 0826  Gross per 24 hour   Intake 880 ml   Output 1040 ml   Net -160 ml         Hemodynamic Parameters:  CVP:  [9 mmHg-11 mmHg] 9 mmHg         Physical Exam  Constitutional:       Comments: Alert, Oriented, No acute distress   HENT:      Head: Normocephalic and atraumatic.   Eyes:      Conjunctiva/sclera: Conjunctivae normal.   Neck:      Vascular: No hepatojugular reflux or JVD.      Comments: JVP at clavicle at 45 degrees.   Cardiovascular:      Rate and Rhythm: Normal rate and regular rhythm.   Pulmonary:      Comments: Normal effort, Clear to auscultation   Abdominal:      Comments: Soft, Non-tender, Non-distended   Musculoskeletal:      Cervical back: Normal range of motion.      Comments: No peripheral edema   Skin:     Comments: Dry, Intact, Warm, Capillary refill <2 seconds.    Neurological:      Mental Status: He is alert and oriented to person, place, and time.      Comments: Normal speech   Psychiatric:         Mood and Affect: Mood and affect normal.            Significant Labs:  CBC:  Recent Labs   Lab 11/10/23  0435 11/11/23  0508 11/12/23  0603   WBC 3.93 3.26* 3.61*   RBC 2.64* 2.70* 2.61*   HGB 8.1* 8.0* 7.7*   HCT 23.4* 24.4* 23.8*    233 230   MCV 89 90 91   MCH 30.7 29.6 29.5   MCHC 34.6 32.8 32.4       BNP:  Recent Labs   Lab 11/07/23  1030 11/09/23  0656   BNP 1,113* 1,204*       CMP:  Recent Labs   Lab 11/09/23  0656 11/09/23  1607 11/10/23  1816 11/11/23  0508 11/12/23  0603   *   < > 116* 129* 123*   CALCIUM 9.5   < > 9.2 9.2 9.2   ALBUMIN 3.7  --  3.8  --   --    PROT 7.5  --  7.3  --   --    *   < > 138 138 137   K 4.9   < > 4.8 4.3 4.3   CO2 21*   < > 21* 23 23      < > 103 105 103   BUN 57*   < >  57* 55* 56*   CREATININE 2.6*   < > 2.5* 2.4* 2.7*   ALKPHOS 85  --  89  --   --    ALT 42  --  37  --   --    AST 26  --  19  --   --    BILITOT 0.8  --  1.0  --   --     < > = values in this interval not displayed.        Coagulation:   Recent Labs   Lab 11/11/23  0958 11/11/23  1600 11/12/23  0003 11/12/23  0603   INR 1.2  --   --   --    APTT 31.3 37.8* 51.8* 47.4*       LDH:  Recent Labs   Lab 11/10/23  1814   *       Microbiology:  Microbiology Results (last 7 days)       ** No results found for the last 168 hours. **            I have reviewed all pertinent labs within the past 24 hours.    Estimated Creatinine Clearance: 31.5 mL/min (A) (based on SCr of 2.7 mg/dL (H)).    Diagnostic Results:  I have reviewed all pertinent imaging results/findings within the past 24 hours.    Assessment and Plan:     63 year old male with hx of CAD s/p 3v CABG (unclear anatomy, 2009), ICM with a recent EF of 15-20% s/p ICD (medtronik 2009), DM2 (a1c 7.7), HTN, HLD, Vfib on amio who presents to the ED with CC of SOB     Pt was recently admitted to [facility] as a transfer.  He was started on a Lasix gtt and did well.  Started on gDMT and discharged home on  5 with plans to follow up in HTS clinic for ongoing transplant evaluation at another facility.  He says that about a few days ago he started to notice LE swelling.  This turned into Nelson and orthopnea.  He says he can't walk to the bathroom without getting SOB.  He also complains of weight gain.  He takes torsemide 20mg BID at home and was told to trial additional Lasix which he did without any improvement.  He was rx metolazone but has not been filled.  He came to the ED     In the ED he was AF with stable VS on RA.  CBC showing chronic anemia.  CMP notable for acute on chronic CKD with baseline around 2.1 and Cr now 2.6.  BNP elevated.  Lactate neg.  CXR showing pulmonary edema.  I evaluated the pt at the bedside.  Bedside TTE showing CVP >15.  He was subsequently  admitted to the CCU for diuresis.     He was continued on his home  and was started on a lasix gtt, which he responded well to overnight (net -1700cc. He feels much better this morning as well. Our transplant coordinators have been working on insurance approval and he is now being transferred to [facility] service for transplant evaluation.       * Acute on chronic combined systolic and diastolic CHF, NYHA class 4  Pt with known ICM with an EF of 25% on home  presenting with decompensated HF.  Not in cardiogenic shock  - Home  gtt at 5  - hold home GDMT: entresto 24/26mg BID  - hold home diuretics: torsemide 20mg BID and metolazone 2.5mg PRN  - Patient appears euvolemic on exam but periodically endorses symptoms of HF. Will continue lasix 20gtt and  5. Will schedule RHC for tomorrow. Hydralazine 25mg tid started for afterload reduction if MAP>80.   - will initiate TXP evaluation    PAF (paroxysmal atrial fibrillation)  Known hx of pAF. In sinus rhythm on admission, but 1 run of AF RVR overnight. He spontaneously converted.  - Continue home amiodarone 400mg qd  - Stop home Eliquis and continue heparin gtt while in the hospital.       IVÁN (acute kidney injury)  IVÁN on CKD2. Baseline Cr 2.1  - Trend with diuresis  - Hold ARNi    Type 2 diabetes mellitus without complication, without long-term current use of insulin  A1c 7.6, not insulin dependent  - MDSSI  - Consider endocrine if uncontrolled    CAD (coronary artery disease)  S/p 3vCABG in 2009  - continue home ASA, HI statin    Ventricular tachycardia  Hx VT s/p ICD placement (medtronic 2009)  - Continue home amio 400mg qd        Angela Shen MD  Heart Transplant  [facility] - Cardiology Stepdown

## 2023-11-12 NOTE — NURSING
Pt had a run of V tach with 4 beats (refer to [facility]). Dr. Shen notified. Pt asymptomatic. VSS.

## 2023-11-12 NOTE — ASSESSMENT & PLAN NOTE
Known hx of pAF. In sinus rhythm on admission, but 1 run of AF RVR overnight. He spontaneously converted.  - Continue home amiodarone 400mg qd  - Stop home Eliquis and continue heparin gtt while in the hospital.

## 2023-11-12 NOTE — ASSESSMENT & PLAN NOTE
BG goal 140-180    Increase Novolog to 6 units TID with meals (0.5 u/kg dosing)   Low Dose Correction Scale (given kidney function)  BG monitoring ac/hs    ** Please call Endocrine for any BG related issues **    Discharge plans: TBD    Lab Results   Component Value Date    HGBA1C 7.6 (H) 10/12/2023

## 2023-11-12 NOTE — ASSESSMENT & PLAN NOTE
Pt with known ICM with an EF of 25% on home  presenting with decompensated HF.  Not in cardiogenic shock  - Home  gtt at 5  - hold home GDMT: entresto 24/26mg BID  - hold home diuretics: torsemide 20mg BID and metolazone 2.5mg PRN  - Patient appears euvolemic on exam but periodically endorses symptoms of HF. Will continue lasix 20gtt and  5. Will schedule RHC for tomorrow. Hydralazine 25mg tid started for afterload reduction if MAP>80.   - will initiate TXP evaluation

## 2023-11-13 LAB
ALLENS TEST: ABNORMAL
ALLENS TEST: ABNORMAL
ANION GAP SERPL CALC-SCNC: 14 MMOL/L (ref 8–16)
APTT PPP: 33.3 SEC (ref 21–32)
APTT PPP: 55.9 SEC (ref 21–32)
BASOPHILS # BLD AUTO: 0.04 K/UL (ref 0–0.2)
BASOPHILS NFR BLD: 0.9 % (ref 0–1.9)
BUN SERPL-MCNC: 59 MG/DL (ref 8–23)
CALCIUM SERPL-MCNC: 9 MG/DL (ref 8.7–10.5)
CHLORIDE SERPL-SCNC: 102 MMOL/L (ref 95–110)
CO2 SERPL-SCNC: 22 MMOL/L (ref 23–29)
CREAT SERPL-MCNC: 2.8 MG/DL (ref 0.5–1.4)
DELSYS: ABNORMAL
DELSYS: ABNORMAL
DIFFERENTIAL METHOD BLD: ABNORMAL
EOSINOPHIL # BLD AUTO: 0.1 K/UL (ref 0–0.5)
EOSINOPHIL NFR BLD: 2.4 % (ref 0–8)
ERYTHROCYTE [DISTWIDTH] IN BLOOD BY AUTOMATED COUNT: 14.6 % (ref 11.5–14.5)
EST. GFR  (NO RACE VARIABLE): 24.9 ML/MIN/1.73 M^2
FIO2: 21
FIO2: 21
GLUCOSE SERPL-MCNC: 104 MG/DL (ref 70–110)
HCO3 UR-SCNC: 22.6 MMOL/L (ref 24–28)
HCO3 UR-SCNC: 22.8 MMOL/L (ref 24–28)
HCT VFR BLD AUTO: 23.5 % (ref 40–54)
HGB BLD-MCNC: 7.8 G/DL (ref 14–18)
IMM GRANULOCYTES # BLD AUTO: 0.02 K/UL (ref 0–0.04)
IMM GRANULOCYTES NFR BLD AUTO: 0.5 % (ref 0–0.5)
LYMPHOCYTES # BLD AUTO: 0.9 K/UL (ref 1–4.8)
LYMPHOCYTES NFR BLD: 21.5 % (ref 18–48)
MAGNESIUM SERPL-MCNC: 2.3 MG/DL (ref 1.6–2.6)
MCH RBC QN AUTO: 30.1 PG (ref 27–31)
MCHC RBC AUTO-ENTMCNC: 33.2 G/DL (ref 32–36)
MCV RBC AUTO: 91 FL (ref 82–98)
MODE: ABNORMAL
MODE: ABNORMAL
MONOCYTES # BLD AUTO: 0.3 K/UL (ref 0.3–1)
MONOCYTES NFR BLD: 6.8 % (ref 4–15)
NEUTROPHILS # BLD AUTO: 2.9 K/UL (ref 1.8–7.7)
NEUTROPHILS NFR BLD: 67.9 % (ref 38–73)
NRBC BLD-RTO: 0 /100 WBC
PCO2 BLDA: 35 MMHG (ref 35–45)
PCO2 BLDA: 35.7 MMHG (ref 35–45)
PH SMN: 7.41 [PH] (ref 7.35–7.45)
PH SMN: 7.42 [PH] (ref 7.35–7.45)
PHOSPHATE SERPL-MCNC: 4.6 MG/DL (ref 2.7–4.5)
PLATELET # BLD AUTO: 253 K/UL (ref 150–450)
PMV BLD AUTO: 10.9 FL (ref 9.2–12.9)
PO2 BLDA: 22 MMHG (ref 40–60)
PO2 BLDA: 26 MMHG (ref 40–60)
POC BE: -2 MMOL/L
POC BE: -2 MMOL/L
POC SATURATED O2: 39 % (ref 95–100)
POC SATURATED O2: 49 % (ref 95–100)
POC TCO2: 24 MMOL/L (ref 24–29)
POC TCO2: 24 MMOL/L (ref 24–29)
POCT GLUCOSE: 163 MG/DL (ref 70–110)
POCT GLUCOSE: 181 MG/DL (ref 70–110)
POCT GLUCOSE: 212 MG/DL (ref 70–110)
POCT GLUCOSE: 220 MG/DL (ref 70–110)
POTASSIUM SERPL-SCNC: 3.9 MMOL/L (ref 3.5–5.1)
RBC # BLD AUTO: 2.59 M/UL (ref 4.6–6.2)
SAMPLE: ABNORMAL
SAMPLE: ABNORMAL
SITE: ABNORMAL
SITE: ABNORMAL
SODIUM SERPL-SCNC: 138 MMOL/L (ref 136–145)
WBC # BLD AUTO: 4.24 K/UL (ref 3.9–12.7)

## 2023-11-13 PROCEDURE — 85730 THROMBOPLASTIN TIME PARTIAL: CPT | Mod: 91,NTX | Performed by: INTERNAL MEDICINE

## 2023-11-13 PROCEDURE — 85025 COMPLETE CBC W/AUTO DIFF WBC: CPT | Mod: NTX | Performed by: STUDENT IN AN ORGANIZED HEALTH CARE EDUCATION/TRAINING PROGRAM

## 2023-11-13 PROCEDURE — 25000003 PHARM REV CODE 250: Mod: NTX | Performed by: INTERNAL MEDICINE

## 2023-11-13 PROCEDURE — 63600175 PHARM REV CODE 636 W HCPCS: Mod: NTX | Performed by: STUDENT IN AN ORGANIZED HEALTH CARE EDUCATION/TRAINING PROGRAM

## 2023-11-13 PROCEDURE — 25000003 PHARM REV CODE 250: Mod: NTX | Performed by: STUDENT IN AN ORGANIZED HEALTH CARE EDUCATION/TRAINING PROGRAM

## 2023-11-13 PROCEDURE — 99233 SBSQ HOSP IP/OBS HIGH 50: CPT | Mod: NTX,,, | Performed by: THORACIC SURGERY (CARDIOTHORACIC VASCULAR SURGERY)

## 2023-11-13 PROCEDURE — 83735 ASSAY OF MAGNESIUM: CPT | Mod: NTX | Performed by: STUDENT IN AN ORGANIZED HEALTH CARE EDUCATION/TRAINING PROGRAM

## 2023-11-13 PROCEDURE — 93451 RIGHT HEART CATH: CPT | Mod: 26,NTX,, | Performed by: INTERNAL MEDICINE

## 2023-11-13 PROCEDURE — 85730 THROMBOPLASTIN TIME PARTIAL: CPT | Mod: NTX | Performed by: STUDENT IN AN ORGANIZED HEALTH CARE EDUCATION/TRAINING PROGRAM

## 2023-11-13 PROCEDURE — 82803 BLOOD GASES ANY COMBINATION: CPT | Mod: NTX

## 2023-11-13 PROCEDURE — 99900035 HC TECH TIME PER 15 MIN (STAT): Mod: NTX

## 2023-11-13 PROCEDURE — 20000000 HC ICU ROOM: Mod: NTX

## 2023-11-13 PROCEDURE — 4A023N6 MEASUREMENT OF CARDIAC SAMPLING AND PRESSURE, RIGHT HEART, PERCUTANEOUS APPROACH: ICD-10-PCS | Performed by: INTERNAL MEDICINE

## 2023-11-13 PROCEDURE — 63600175 PHARM REV CODE 636 W HCPCS: Mod: NTX | Performed by: NURSE PRACTITIONER

## 2023-11-13 PROCEDURE — 25000003 PHARM REV CODE 250: Mod: NTX | Performed by: PHYSICIAN ASSISTANT

## 2023-11-13 PROCEDURE — 93451 RIGHT HEART CATH: CPT | Mod: NTX | Performed by: INTERNAL MEDICINE

## 2023-11-13 PROCEDURE — 82800 BLOOD PH: CPT | Mod: NTX

## 2023-11-13 PROCEDURE — 80048 BASIC METABOLIC PNL TOTAL CA: CPT | Mod: NTX | Performed by: STUDENT IN AN ORGANIZED HEALTH CARE EDUCATION/TRAINING PROGRAM

## 2023-11-13 PROCEDURE — 63600175 PHARM REV CODE 636 W HCPCS: Mod: NTX | Performed by: INTERNAL MEDICINE

## 2023-11-13 PROCEDURE — 94761 N-INVAS EAR/PLS OXIMETRY MLT: CPT | Mod: NTX

## 2023-11-13 PROCEDURE — 93005 ELECTROCARDIOGRAM TRACING: CPT | Mod: NTX

## 2023-11-13 PROCEDURE — C1751 CATH, INF, PER/CENT/MIDLINE: HCPCS | Mod: NTX | Performed by: INTERNAL MEDICINE

## 2023-11-13 PROCEDURE — C1894 INTRO/SHEATH, NON-LASER: HCPCS | Mod: NTX | Performed by: INTERNAL MEDICINE

## 2023-11-13 PROCEDURE — 93010 ELECTROCARDIOGRAM REPORT: CPT | Mod: NTX,,, | Performed by: INTERNAL MEDICINE

## 2023-11-13 PROCEDURE — 84100 ASSAY OF PHOSPHORUS: CPT | Mod: NTX | Performed by: STUDENT IN AN ORGANIZED HEALTH CARE EDUCATION/TRAINING PROGRAM

## 2023-11-13 PROCEDURE — 99232 SBSQ HOSP IP/OBS MODERATE 35: CPT | Mod: NTX,,, | Performed by: NURSE PRACTITIONER

## 2023-11-13 RX ORDER — LIDOCAINE HYDROCHLORIDE 20 MG/ML
INJECTION, SOLUTION INFILTRATION; PERINEURAL
Status: DISCONTINUED | OUTPATIENT
Start: 2023-11-13 | End: 2023-11-13 | Stop reason: HOSPADM

## 2023-11-13 RX ORDER — SODIUM CHLORIDE 9 MG/ML
INJECTION, SOLUTION INTRAVENOUS
Status: DISCONTINUED | OUTPATIENT
Start: 2023-11-13 | End: 2023-12-01

## 2023-11-13 RX ORDER — FUROSEMIDE 10 MG/ML
80 INJECTION INTRAMUSCULAR; INTRAVENOUS ONCE
Status: COMPLETED | OUTPATIENT
Start: 2023-11-13 | End: 2023-11-13

## 2023-11-13 RX ORDER — INSULIN ASPART 100 [IU]/ML
7 INJECTION, SOLUTION INTRAVENOUS; SUBCUTANEOUS
Status: DISCONTINUED | OUTPATIENT
Start: 2023-11-13 | End: 2023-11-16

## 2023-11-13 RX ADMIN — Medication 400 MG: at 09:11

## 2023-11-13 RX ADMIN — POTASSIUM CHLORIDE 40 MEQ: 1500 TABLET, EXTENDED RELEASE ORAL at 09:11

## 2023-11-13 RX ADMIN — ACETAMINOPHEN 650 MG: 325 TABLET ORAL at 06:11

## 2023-11-13 RX ADMIN — THERA TABS 1 TABLET: TAB at 09:11

## 2023-11-13 RX ADMIN — HYDRALAZINE HYDROCHLORIDE 25 MG: 25 TABLET, FILM COATED ORAL at 02:11

## 2023-11-13 RX ADMIN — INSULIN ASPART 2 UNITS: 100 INJECTION, SOLUTION INTRAVENOUS; SUBCUTANEOUS at 06:11

## 2023-11-13 RX ADMIN — FUROSEMIDE 80 MG: 10 INJECTION, SOLUTION INTRAVENOUS at 06:11

## 2023-11-13 RX ADMIN — INSULIN ASPART 6 UNITS: 100 INJECTION, SOLUTION INTRAVENOUS; SUBCUTANEOUS at 02:11

## 2023-11-13 RX ADMIN — HEPARIN SODIUM AND DEXTROSE 14 UNITS/KG/HR: 10000; 5 INJECTION INTRAVENOUS at 05:11

## 2023-11-13 RX ADMIN — INSULIN ASPART 7 UNITS: 100 INJECTION, SOLUTION INTRAVENOUS; SUBCUTANEOUS at 06:11

## 2023-11-13 RX ADMIN — DOBUTAMINE IN DEXTROSE 5 MCG/KG/MIN: 400 INJECTION, SOLUTION INTRAVENOUS at 05:11

## 2023-11-13 RX ADMIN — ASPIRIN 81 MG: 81 TABLET, COATED ORAL at 09:11

## 2023-11-13 RX ADMIN — PANTOPRAZOLE SODIUM 40 MG: 40 TABLET, DELAYED RELEASE ORAL at 05:11

## 2023-11-13 RX ADMIN — INSULIN ASPART 6 UNITS: 100 INJECTION, SOLUTION INTRAVENOUS; SUBCUTANEOUS at 09:11

## 2023-11-13 RX ADMIN — ATORVASTATIN CALCIUM 40 MG: 40 TABLET, FILM COATED ORAL at 09:11

## 2023-11-13 RX ADMIN — CHLOROTHIAZIDE SODIUM 500 MG: 500 INJECTION, POWDER, LYOPHILIZED, FOR SOLUTION INTRAVENOUS at 09:11

## 2023-11-13 RX ADMIN — GABAPENTIN 300 MG: 300 CAPSULE ORAL at 09:11

## 2023-11-13 RX ADMIN — INSULIN ASPART 2 UNITS: 100 INJECTION, SOLUTION INTRAVENOUS; SUBCUTANEOUS at 02:11

## 2023-11-13 RX ADMIN — AMIODARONE HYDROCHLORIDE 400 MG: 200 TABLET ORAL at 09:11

## 2023-11-13 NOTE — ASSESSMENT & PLAN NOTE
Lab Results   Component Value Date    CREATININE 2.8 (H) 11/13/2023     Avoid insulin stacking  Titrate insulin slowly

## 2023-11-13 NOTE — PROGRESS NOTES
[facility] - Cardiology Stepdown  Heart Transplant  Progress Note    Patient Name: Radha Abbott  MRN: [id number]  Admission Date: 11/9/2023  Hospital Length of Stay: 4 days  Attending Physician: Juventino Bermudez Jr.*  Primary Care Provider: Vasu Kong MD  Principal Problem:Acute on chronic combined systolic and diastolic CHF, NYHA class 4    Subjective:   Interval History: No acute events or complaint this AM.     CVP 7  SVO2 49  CO/CI 5.13   SVR 1122    Continuous Infusions:   DOBUTamine 5 mcg/kg/min (11/13/23 0519)    furosemide (LASIX) 500 mg in 50 mL infusion (conc: 10 mg/mL) 10 mg/hr (11/13/23 1150)    heparin (porcine) in D5W 14 Units/kg/hr (11/13/23 0520)     Scheduled Meds:   amiodarone  400 mg Oral Daily    aspirin  81 mg Oral Daily    atorvastatin  40 mg Oral Daily    hydrALAZINE  25 mg Oral Q8H    insulin aspart U-100  6 Units Subcutaneous TIDWM    magnesium oxide  400 mg Oral Daily    multivitamin  1 tablet Oral Daily    pantoprazole  40 mg Oral Before breakfast    potassium chloride  40 mEq Oral BID     PRN Meds:acetaminophen, dextrose 10%, dextrose 10%, gabapentin, glucagon (human recombinant), glucose, glucose, heparin (PORCINE), heparin (PORCINE), insulin aspart U-100    Review of patient's allergies indicates:  No Known Allergies  Objective:     Vital Signs (Most Recent):  Temp: 98.1 °F (36.7 °C) (11/13/23 1130)  Pulse: 79 (11/13/23 1130)  Resp: 18 (11/13/23 1130)  BP: 94/60 (11/13/23 1130)  SpO2: 100 % (11/13/23 1130) Vital Signs (24h Range):  Temp:  [96.5 °F (35.8 °C)-98.4 °F (36.9 °C)] 98.1 °F (36.7 °C)  Pulse:  [] 79  Resp:  [16-20] 18  SpO2:  [95 %-100 %] 100 %  BP: ()/(58-63) 94/60     Patient Vitals for the past 72 hrs (Last 3 readings):   Weight   11/12/23 0905 78.1 kg (172 lb 2.9 oz)   11/12/23 0615 78.1 kg (172 lb 2.9 oz)   11/11/23 0830 77.8 kg (171 lb 8.3 oz)       Body mass index is 23.35 kg/m².      Intake/Output Summary (Last 24 hours) at 11/13/2023 1339  Last  data filed at 11/13/2023 0521  Gross per 24 hour   Intake 360 ml   Output 875 ml   Net -515 ml         Hemodynamic Parameters:  CVP:  [7 mmHg] 7 mmHg         Physical Exam  Constitutional:       Comments: Alert, Oriented, No acute distress   HENT:      Head: Normocephalic and atraumatic.   Eyes:      Conjunctiva/sclera: Conjunctivae normal.   Neck:      Vascular: No hepatojugular reflux or JVD.      Comments: JVP at clavicle at 45 degrees.   Cardiovascular:      Rate and Rhythm: Normal rate and regular rhythm.   Pulmonary:      Comments: Normal effort, Clear to auscultation   Abdominal:      Comments: Soft, Non-tender, Non-distended   Musculoskeletal:      Cervical back: Normal range of motion.      Comments: No peripheral edema   Skin:     Comments: Dry, Intact, Warm, Capillary refill <2 seconds.    Neurological:      Mental Status: He is alert and oriented to person, place, and time.      Comments: Normal speech   Psychiatric:         Mood and Affect: Mood and affect normal.            Significant Labs:  CBC:  Recent Labs   Lab 11/11/23  0508 11/12/23  0603 11/13/23  0504   WBC 3.26* 3.61* 4.24   RBC 2.70* 2.61* 2.59*   HGB 8.0* 7.7* 7.8*   HCT 24.4* 23.8* 23.5*    230 253   MCV 90 91 91   MCH 29.6 29.5 30.1   MCHC 32.8 32.4 33.2       BNP:  Recent Labs   Lab 11/07/23  1030 11/09/23  0656   BNP 1,113* 1,204*       CMP:  Recent Labs   Lab 11/09/23  0656 11/09/23  1607 11/10/23  1816 11/11/23  0508 11/12/23  0603 11/12/23  1911 11/13/23  0504   *   < > 116*   < > 123* 234* 104   CALCIUM 9.5   < > 9.2   < > 9.2 8.7 9.0   ALBUMIN 3.7  --  3.8  --   --   --   --    PROT 7.5  --  7.3  --   --   --   --    *   < > 138   < > 137 134* 138   K 4.9   < > 4.8   < > 4.3 4.9 3.9   CO2 21*   < > 21*   < > 23 19* 22*      < > 103   < > 103 104 102   BUN 57*   < > 57*   < > 56* 58* 59*   CREATININE 2.6*   < > 2.5*   < > 2.7* 2.9* 2.8*   ALKPHOS 85  --  89  --   --   --   --    ALT 42  --  37  --   --   --    --    AST 26  --  19  --   --   --   --    BILITOT 0.8  --  1.0  --   --   --   --     < > = values in this interval not displayed.        Coagulation:   Recent Labs   Lab 11/11/23  0958 11/11/23  1600 11/12/23  0003 11/12/23  0603 11/13/23  0504   INR 1.2  --   --   --   --    APTT 31.3   < > 51.8* 47.4* 55.9*    < > = values in this interval not displayed.       LDH:  Recent Labs   Lab 11/10/23  1814   *       Microbiology:  Microbiology Results (last 7 days)       ** No results found for the last 168 hours. **            I have reviewed all pertinent labs within the past 24 hours.    Estimated Creatinine Clearance: 30.4 mL/min (A) (based on SCr of 2.8 mg/dL (H)).    Diagnostic Results:  I have reviewed all pertinent imaging results/findings within the past 24 hours.  Assessment and Plan:     63 year old male with hx of CAD s/p 3v CABG (unclear anatomy, 2009), ICM with a recent EF of 15-20% s/p ICD (medtronik 2009), DM2 (a1c 7.7), HTN, HLD, Vfib on amio who presents to the ED with CC of SOB     Pt was recently admitted to [facility] as a transfer.  He was started on a Lasix gtt and did well.  Started on gDMT and discharged home on  5 with plans to follow up in HTS clinic for ongoing transplant evaluation at another facility.  He says that about a few days ago he started to notice LE swelling.  This turned into Nelson and orthopnea.  He says he can't walk to the bathroom without getting SOB.  He also complains of weight gain.  He takes torsemide 20mg BID at home and was told to trial additional Lasix which he did without any improvement.  He was rx metolazone but has not been filled.  He came to the ED     In the ED he was AF with stable VS on RA.  CBC showing chronic anemia.  CMP notable for acute on chronic CKD with baseline around 2.1 and Cr now 2.6.  BNP elevated.  Lactate neg.  CXR showing pulmonary edema.  I evaluated the pt at the bedside.  Bedside TTE showing CVP >15.  He was subsequently admitted to the  CCU for diuresis.     He was continued on his home  and was started on a lasix gtt, which he responded well to overnight (net -1700cc. He feels much better this morning as well. Our transplant coordinators have been working on insurance approval and he is now being transferred to [facility] service for transplant evaluation.     * Acute on chronic combined systolic and diastolic CHF, NYHA class 4  Pt with known ICM with an EF of 25% on home  presenting with decompensated HF.  Not in cardiogenic shock  - Home  gtt at 5  - hold home GDMT: entresto 24/26mg BID  - hold home diuretics: torsemide 20mg BID and metolazone 2.5mg PRN  - Patient appears euvolemic on exam but periodically endorses symptoms of HF. Reduce lasix to 10 mg/hr and continue  5. RHC when cath lab has availability. Hydralazine 25mg tid started for afterload reduction if MAP>80.   - Continue VAD/OHTx workup    PAF (paroxysmal atrial fibrillation)  Known hx of pAF. In sinus rhythm on admission, but 1 run of AF RVR overnight. He spontaneously converted.  - Continue home amiodarone 400mg qd  - Stop home Eliquis and continue heparin gtt while in the hospital.       IVÁN (acute kidney injury)  IVÁN on CKD2. Baseline Cr 2.1  - Trend with diuresis  - Hold ARNi    Type 2 diabetes mellitus without complication, without long-term current use of insulin  A1c 7.6, not insulin dependent  - MDSSI  - Consider endocrine if uncontrolled    CAD (coronary artery disease)  S/p 3vCABG in 2009  - continue home ASA, HI statin    Ventricular tachycardia  Hx VT s/p ICD placement (medtronic 2009)  - Continue home amio 400mg qd        Jimy An PA-C  Heart Transplant  [facility] - Cardiology Stepdown

## 2023-11-13 NOTE — ASSESSMENT & PLAN NOTE
Pt with known ICM with an EF of 25% on home  presenting with decompensated HF.  Not in cardiogenic shock  - Home  gtt at 5  - hold home GDMT: entresto 24/26mg BID  - hold home diuretics: torsemide 20mg BID and metolazone 2.5mg PRN  - Patient appears euvolemic on exam but periodically endorses symptoms of HF. Reduce lasix to 10 mg/hr and continue  5. RHC when cath lab has availability. Hydralazine 25mg tid started for afterload reduction if MAP>80.   - Continue VAD/OHTx workup

## 2023-11-13 NOTE — PLAN OF CARE
Plan of care discussed with patient. Patient is free of fall/trauma/injury. Denies CP, SOB, or pain/discomfort. All questions addressed. Will continue to monitor    VSS. , lasix & heparin infusing per order. PRN pain meds given w relief. BG checked. I&O monitored      Problem: Adult Inpatient Plan of Care  Goal: Patient-Specific Goal (Individualized)  Outcome: Ongoing, Progressing     Problem: Diabetes Comorbidity  Goal: Blood Glucose Level Within Targeted Range  Outcome: Ongoing, Progressing

## 2023-11-13 NOTE — H&P (VIEW-ONLY)
[facility] - Cardiology Stepdown  Cardiothoracic Surgery  Consult Note    Patient Name: Radha Abbott  MRN: [id number]  Admission Date: 11/9/2023  Attending Physician: Juventino Bermudez Jr.*  Referring Provider: Self, Aaareferral    Patient information was obtained from patient, past medical records, and ER records.     Consults  Subjective:     Principal Problem: Acute on chronic combined systolic and diastolic CHF, NYHA class 4    History of Present Illness: Mr. Radha Abbott is a 63 year old male with hx of CAD s/p 3v CABG (unclear anatomy, 2009), ICM with a recent EF of 15-20% s/p ICD (medtronik 2009), DM2 (a1c 7.7), HTN, HLD, Vfib on amio who presents to the ED with CC of SOB     Pt was recently admitted to [facility] as a transfer.  He was started on a Lasix gtt and did well.  Started on gDMT and discharged home on  5 with plans to follow up in [facility] clinic for ongoing transplant evaluation at another facility.  He says that about a few days ago he started to notice LE swelling.  This turned into Nelson and orthopnea.  He says he can't walk to the bathroom without getting SOB.  He also complains of weight gain.  He takes torsemide 20mg BID at home and was told to trial additional Lasix which he did without any improvement.  He was rx metolazone but has not been filled.  He came to the ED     In the ED he was AF with stable VS on RA.  CBC showing chronic anemia.  CMP notable for acute on chronic CKD with baseline around 2.1 and Cr now 2.6.  BNP elevated.  Lactate neg.  CXR showing pulmonary edema.  I evaluated the pt at the bedside.  Bedside TTE showing CVP >15.  He was subsequently admitted to the CCU for diuresis.      He was continued on his home  and was started on a lasix gtt, which he responded well to overnight (net -1700cc. He feels much better this morning as well. Our transplant coordinators have been working on insurance approval and he is now being transferred to [facility] service for transplant evaluation.     CTS  is being consulted for advanced options secondary to advanced heart failure    No current facility-administered medications on file prior to encounter.     Current Outpatient Medications on File Prior to Encounter   Medication Sig    amiodarone (PACERONE) 400 MG tablet Take 1 tablet (400 mg total) by mouth once daily.    apixaban (ELIQUIS) 5 mg Tab Take 1 tablet (5 mg total) by mouth 2 (two) times daily.    aspirin (ECOTRIN) 81 MG EC tablet Take 81 mg by mouth once daily.    atorvastatin (LIPITOR) 40 MG tablet Take 40 mg by mouth.    DOBUTamine (DOBUTREX) 1,000 mg/250 mL (4,000 mcg/mL) infusion Inject 389.5 mcg/min into the vein continuous.    fish oil-omega-3 fatty acids 300-1,000 mg capsule Take 2 g by mouth once daily.    furosemide (LASIX) 40 MG tablet Take 1 tablet (40 mg total) by mouth 2 (two) times daily.    gabapentin (NEURONTIN) 300 MG capsule Take 300 mg by mouth nightly as needed.    multivitamin capsule Take 1 capsule by mouth once daily.    pantoprazole (PROTONIX) 40 MG tablet Take 1 tablet (40 mg total) by mouth before breakfast.    potassium chloride SA (K-DUR,KLOR-CON) 20 MEQ tablet Take 1 tablet (20 mEq total) by mouth once daily.    sacubitriL-valsartan (ENTRESTO) 24-26 mg per tablet Take 1 tablet by mouth 2 (two) times daily.    metOLazone (ZAROXOLYN) 2.5 MG tablet Take 1 tablet (2.5 mg total) by mouth once daily. Thursday 11/9 and Saturday 11/10    torsemide (DEMADEX) 20 MG Tab Take 2 tablets (40 mg total) by mouth 2 (two) times a day.       Review of patient's allergies indicates:  No Known Allergies    Past Medical History:   Diagnosis Date    CAD (coronary artery disease)     Diabetes mellitus     HFrEF (heart failure with reduced ejection fraction)     ICD (implantable cardioverter-defibrillator) in place     MI, old      Past Surgical History:   Procedure Laterality Date    CARDIAC SURGERY      RIGHT HEART CATHETERIZATION Right 10/10/2023    Procedure: INSERTION, CATHETER, RIGHT HEART;   Surgeon: Bin Gandhi MD;  Location: [facility] CATH LAB;  Service: Cardiology;  Laterality: Right;    RIGHT HEART CATHETERIZATION Right 10/13/2023    Procedure: INSERTION, CATHETER, RIGHT HEART;  Surgeon: Walter Mcintyre MD;  Location: [facility] CATH LAB;  Service: Cardiology;  Laterality: Right;    RIGHT HEART CATHETERIZATION  11/13/2023     Family History    None       Tobacco Use    Smoking status: Every Day     Current packs/day: 0.50     Types: Cigarettes    Smokeless tobacco: Not on file   Substance and Sexual Activity    Alcohol use: Yes     Comment: rarely    Drug use: No    Sexual activity: Not on file     Review of Systems   Constitutional:  Positive for activity change and fatigue. Negative for appetite change and fever.   HENT:  Negative for nosebleeds.    Respiratory:  Positive for shortness of breath. Negative for cough.    Cardiovascular:  Negative for chest pain, palpitations and leg swelling.   Gastrointestinal:  Negative for abdominal distention, abdominal pain and nausea.   Genitourinary:  Negative for frequency.   Musculoskeletal:  Negative for arthralgias and myalgias.   Skin:  Negative for rash.   Neurological:  Negative for dizziness and numbness.   Hematological:  Does not bruise/bleed easily.     Objective:     Vital Signs (Most Recent):  Temp: 98.1 °F (36.7 °C) (11/13/23 1130)  Pulse: 79 (11/13/23 1130)  Resp: 18 (11/13/23 1130)  BP: 94/60 (11/13/23 1130)  SpO2: 100 % (11/13/23 1130) Vital Signs (24h Range):  Temp:  [96.5 °F (35.8 °C)-98.4 °F (36.9 °C)] 98.1 °F (36.7 °C)  Pulse:  [] 79  Resp:  [16-20] 18  SpO2:  [95 %-100 %] 100 %  BP: ()/(58-63) 94/60     Weight: 78.1 kg (172 lb 2.9 oz)  Body mass index is 23.35 kg/m².    SpO2: 100 %        Intake/Output - Last 3 Shifts         11/11 0700  11/12 0659 11/12 0700  11/13 0659 11/13 0700  11/14 0659    P.O. 980 840     I.V. (mL/kg)  20 (0.3)     Total Intake(mL/kg) 980 (12.5) 860 (11)     Urine (mL/kg/hr) 1350 (0.7) 1650 (0.9)     Stool  0      Total Output 1350 1650     Net -370 -790            Urine Occurrence 1 x      Stool Occurrence 1 x               Lines/Drains/Airways       Peripherally Inserted Central Catheter Line  Duration             PICC Double Lumen 10/13/23 1542 right basilic 31 days              Peripheral Intravenous Line  Duration                  Peripheral IV - Single Lumen 11/09/23 0656 20 G Left Antecubital 4 days                          Physical Exam  HENT:      Head: Normocephalic and atraumatic.   Eyes:      Extraocular Movements: Extraocular movements intact.   Cardiovascular:      Rate and Rhythm: Normal rate and regular rhythm.   Pulmonary:      Effort: Pulmonary effort is normal.   Abdominal:      General: Abdomen is flat.      Palpations: Abdomen is soft.   Musculoskeletal:         General: Normal range of motion.      Cervical back: Normal range of motion.      Comments: PICC line   Skin:     General: Skin is warm and dry.      Capillary Refill: Capillary refill takes less than 2 seconds.   Neurological:      General: No focal deficit present.            Significant Labs:  BMP:   Recent Labs   Lab 11/13/23  0504         K 3.9      CO2 22*   BUN 59*   CREATININE 2.8*   CALCIUM 9.0   MG 2.3     CBC:   Recent Labs   Lab 11/13/23  0504   WBC 4.24   RBC 2.59*   HGB 7.8*   HCT 23.5*      MCV 91   MCH 30.1   MCHC 33.2     CMP:   Recent Labs   Lab 11/13/23  0504      CALCIUM 9.0      K 3.9   CO2 22*      BUN 59*   CREATININE 2.8*     Coagulation:   Recent Labs   Lab 11/13/23  0504   APTT 55.9*       Significant Diagnostics:  BMI 23.35  Blood group A+  Cr 2.8 - baseline 2.1    Ct CAP:  Impression:     1. Cardiomegaly.  2. Innumerable diffuse subcentimeter ground-glass pulmonary nodules throughout both lungs.  Findings are favored to represent sequela of small airways infection/inflammation.  3. Cholelithiasis.  4. Significant calcific atherosclerosis of the abdominal aorta.   Several regions of linear calcification traverse the abdominal aortic lumen which may represent irregular plaque or short segment dissections, incompletely evaluated on noncontrast exam.  5. Additional findings as above.    ECHO 10/19/2023    Left Ventricle: The left ventricle is severely dilated. Moderately increased ventricular mass. Normal wall thickness. There is moderate eccentric hypertrophy. regional wall motion abnormalities present. There is severely reduced systolic function with a visually estimated ejection fraction of 15 - 20%. Biplane (2D) method of discs ejection fraction is 18%. There is diastolic dysfunction.    Left Atrium: Left atrium is severely dilated.    Right Ventricle: Normal right ventricular cavity size. Systolic function is normal.    Aortic Valve: There is mild aortic valve sclerosis.    Mitral Valve: There is mild to moderate regurgitation.    IVC/SVC: Intermediate venous pressure at 8 mmHg.  LVIDD 7.1  TAPSE 1.87  I have reviewed and interpreted all pertinent imaging results/findings within the past 24 hours.    Assessment/Plan:     NYHA Score: NYHA III: marked limitation of physical activity, comfortable at rest    * Acute on chronic combined systolic and diastolic CHF, NYHA class 4  Mr. Radha Abbott is a 63 year old male with hx of CAD s/p 3v CABG (unclear anatomy, 2009), ICM with a recent EF of 15-20% s/p ICD (medtronik 2009), DM2 (a1c 7.7), HTN, HLD, Vfib on amio who presents to the ED with CC of SOB. CTS is being consulted for advanced options secondary to advanced heart failure. ECHO reveals EF 15-20%, LVIDD 7.1, TAPSE 1.87.  CT reviewed and does not preclude patient from advanced options.    Will need previous records from CABG at [facility] in 2009.    Dr. Washington to staff and give recommendations.        Thank you for your consult. I will follow-up with patient. Please contact us if you have any additional questions.    Jackie Ruff NP  Cardiothoracic  Surgery  [facility] - Cardiology Stepdown    Patient seen and pertinent studies were reviewed.  Patient has previous coronary artery bypass grafting done at [facility] in 2009.  CT scan does not preclude from advanced therapies.  Will request or operative note.  We will discuss candidacy at the multidisciplinary selection meeting.  Concern in fact include VFib on admission to the ED.

## 2023-11-13 NOTE — SUBJECTIVE & OBJECTIVE
Interval History: No acute events or complaint this AM.     CVP 7  SVO2 49  CO/CI 5.13   SVR 1122    Continuous Infusions:   DOBUTamine 5 mcg/kg/min (11/13/23 0519)    furosemide (LASIX) 500 mg in 50 mL infusion (conc: 10 mg/mL) 10 mg/hr (11/13/23 1150)    heparin (porcine) in D5W 14 Units/kg/hr (11/13/23 0520)     Scheduled Meds:   amiodarone  400 mg Oral Daily    aspirin  81 mg Oral Daily    atorvastatin  40 mg Oral Daily    hydrALAZINE  25 mg Oral Q8H    insulin aspart U-100  6 Units Subcutaneous TIDWM    magnesium oxide  400 mg Oral Daily    multivitamin  1 tablet Oral Daily    pantoprazole  40 mg Oral Before breakfast    potassium chloride  40 mEq Oral BID     PRN Meds:acetaminophen, dextrose 10%, dextrose 10%, gabapentin, glucagon (human recombinant), glucose, glucose, heparin (PORCINE), heparin (PORCINE), insulin aspart U-100    Review of patient's allergies indicates:  No Known Allergies  Objective:     Vital Signs (Most Recent):  Temp: 98.1 °F (36.7 °C) (11/13/23 1130)  Pulse: 79 (11/13/23 1130)  Resp: 18 (11/13/23 1130)  BP: 94/60 (11/13/23 1130)  SpO2: 100 % (11/13/23 1130) Vital Signs (24h Range):  Temp:  [96.5 °F (35.8 °C)-98.4 °F (36.9 °C)] 98.1 °F (36.7 °C)  Pulse:  [] 79  Resp:  [16-20] 18  SpO2:  [95 %-100 %] 100 %  BP: ()/(58-63) 94/60     Patient Vitals for the past 72 hrs (Last 3 readings):   Weight   11/12/23 0905 78.1 kg (172 lb 2.9 oz)   11/12/23 0615 78.1 kg (172 lb 2.9 oz)   11/11/23 0830 77.8 kg (171 lb 8.3 oz)       Body mass index is 23.35 kg/m².      Intake/Output Summary (Last 24 hours) at 11/13/2023 1339  Last data filed at 11/13/2023 0521  Gross per 24 hour   Intake 360 ml   Output 875 ml   Net -515 ml         Hemodynamic Parameters:  CVP:  [7 mmHg] 7 mmHg         Physical Exam  Constitutional:       Comments: Alert, Oriented, No acute distress   HENT:      Head: Normocephalic and atraumatic.   Eyes:      Conjunctiva/sclera: Conjunctivae normal.   Neck:      Vascular: No  hepatojugular reflux or JVD.      Comments: JVP at clavicle at 45 degrees.   Cardiovascular:      Rate and Rhythm: Normal rate and regular rhythm.   Pulmonary:      Comments: Normal effort, Clear to auscultation   Abdominal:      Comments: Soft, Non-tender, Non-distended   Musculoskeletal:      Cervical back: Normal range of motion.      Comments: No peripheral edema   Skin:     Comments: Dry, Intact, Warm, Capillary refill <2 seconds.    Neurological:      Mental Status: He is alert and oriented to person, place, and time.      Comments: Normal speech   Psychiatric:         Mood and Affect: Mood and affect normal.            Significant Labs:  CBC:  Recent Labs   Lab 11/11/23  0508 11/12/23  0603 11/13/23  0504   WBC 3.26* 3.61* 4.24   RBC 2.70* 2.61* 2.59*   HGB 8.0* 7.7* 7.8*   HCT 24.4* 23.8* 23.5*    230 253   MCV 90 91 91   MCH 29.6 29.5 30.1   MCHC 32.8 32.4 33.2       BNP:  Recent Labs   Lab 11/07/23  1030 11/09/23  0656   BNP 1,113* 1,204*       CMP:  Recent Labs   Lab 11/09/23  0656 11/09/23  1607 11/10/23  1816 11/11/23  0508 11/12/23  0603 11/12/23  1911 11/13/23  0504   *   < > 116*   < > 123* 234* 104   CALCIUM 9.5   < > 9.2   < > 9.2 8.7 9.0   ALBUMIN 3.7  --  3.8  --   --   --   --    PROT 7.5  --  7.3  --   --   --   --    *   < > 138   < > 137 134* 138   K 4.9   < > 4.8   < > 4.3 4.9 3.9   CO2 21*   < > 21*   < > 23 19* 22*      < > 103   < > 103 104 102   BUN 57*   < > 57*   < > 56* 58* 59*   CREATININE 2.6*   < > 2.5*   < > 2.7* 2.9* 2.8*   ALKPHOS 85  --  89  --   --   --   --    ALT 42  --  37  --   --   --   --    AST 26  --  19  --   --   --   --    BILITOT 0.8  --  1.0  --   --   --   --     < > = values in this interval not displayed.        Coagulation:   Recent Labs   Lab 11/11/23  0958 11/11/23  1600 11/12/23  0003 11/12/23  0603 11/13/23  0504   INR 1.2  --   --   --   --    APTT 31.3   < > 51.8* 47.4* 55.9*    < > = values in this interval not displayed.        LDH:  Recent Labs   Lab 11/10/23  1814   *       Microbiology:  Microbiology Results (last 7 days)       ** No results found for the last 168 hours. **            I have reviewed all pertinent labs within the past 24 hours.    Estimated Creatinine Clearance: 30.4 mL/min (A) (based on SCr of 2.8 mg/dL (H)).    Diagnostic Results:  I have reviewed all pertinent imaging results/findings within the past 24 hours.

## 2023-11-13 NOTE — HPI
63 year old male with hx of CAD s/p 3v CABG (unclear anatomy, 2009), ICM with a recent EF of 15-20% s/p ICD (medmauricio 2009), DM2 (a1c 7.7), HTN, HLD, Vfib on amio who presents to the ED with CC of SOB     [place] [facility] [place].  He was started on a Lasix gtt and did well.  Started on gDMT and discharged home on  5 with plans to follow up in [facility] clinic for ongoing transplant evaluation at another facility.  He says that about a few days ago he started to notice LE swelling.  This turned into Nelson and orthopnea.  He says he can't walk to the bathroom without getting SOB.  He also complains of weight gain.  He takes torsemide 20mg BID at home and was told to trial additional Lasix which he did without any improvement.  He was rx metolazone but has not been filled.  He came to the ED     In the ED he was AF with stable VS on RA.  CBC showing chronic anemia.  CMP notable for acute on chronic CKD with baseline around 2.1 and Cr now 2.6.  BNP elevated.  Lactate neg.  CXR showing pulmonary edema.  I evaluated the pt at the bedside.  Bedside TTE showing CVP >15.  He was subsequently admitted to the CCU for diuresis.      He was continued on his home  and was started on a lasix gtt, which he responded well to overnight (net -1700cc. He feels much better this morning as well. Our transplant coordinators have been working on insurance approval and he is now being transferred to [facility] service for transplant evaluation.     CTS is being consulted for advanced options secondary to HFrEF

## 2023-11-13 NOTE — NURSING
1654: pt arrives with heparin infusing at 14units/kg/hr, dobutamine 5mcg/kg/hr, and lasix 10mg/hr to ICU from cath lab. CVP 12. MD informed of pt's arrival and CVP to unit. Pt AAOx4. Denies complaints    1824: informed Dr. Anne that ptt is 33.3. pt has heparin infusing at 14units/kg/hr. per Dr. Anne inc per nomogram by 2units/kg/hr but do not give bolus.

## 2023-11-13 NOTE — HPI
Mr. Radha Abbott is a 63 year old male with hx of CAD s/p 3v CABG (unclear anatomy, 2009), ICM with a recent EF of 15-20% s/p ICD (medmauricio 2009), DM2 (a1c 7.7), HTN, HLD, Vfib on amio who presents to the ED with CC of SOB     Pt was recently admitted to [facility] as a transfer.  He was started on a Lasix gtt and did well.  Started on gDMT and discharged home on  5 with plans to follow up in [facility] clinic for ongoing transplant evaluation at another facility.  He says that about a few days ago he started to notice LE swelling.  This turned into Nelson and orthopnea.  He says he can't walk to the bathroom without getting SOB.  He also complains of weight gain.  He takes torsemide 20mg BID at home and was told to trial additional Lasix which he did without any improvement.  He was rx metolazone but has not been filled.  He came to the ED     In the ED he was AF with stable VS on RA.  CBC showing chronic anemia.  CMP notable for acute on chronic CKD with baseline around 2.1 and Cr now 2.6.  BNP elevated.  Lactate neg.  CXR showing pulmonary edema.  I evaluated the pt at the bedside.  Bedside TTE showing CVP >15.  He was subsequently admitted to the CCU for diuresis.      He was continued on his home  and was started on a lasix gtt, which he responded well to overnight (net -1700cc. He feels much better this morning as well. Our transplant coordinators have been working on insurance approval and he is now being transferred to [facility] service for transplant evaluation.     CTS is being consulted for advanced options secondary to advanced heart failure

## 2023-11-13 NOTE — SUBJECTIVE & OBJECTIVE
"Interval HPI:   Overnight events: Remains in CSU. BG at or slightly above goal ranges on current SQ insulin regimen. Creatinine 2.8. Diet diabetic Cardiac (Low Na/Chol);  Calorie    Eatin%  Nausea: No  Hypoglycemia and intervention: No  Fever: No  TPN and/or TF: No  If yes, type of TF/TPN and rate: n/a    BP 94/60 (BP Location: Left arm, Patient Position: Sitting)   Pulse 79   Temp 98.1 °F (36.7 °C) (Oral)   Resp 18   Ht 6' (1.829 m)   Wt 78.1 kg (172 lb 2.9 oz)   SpO2 100%   BMI 23.35 kg/m²     Labs Reviewed and Include    Recent Labs   Lab 23  0504      CALCIUM 9.0      K 3.9   CO2 22*      BUN 59*   CREATININE 2.8*     Lab Results   Component Value Date    WBC 4.24 2023    HGB 7.8 (L) 2023    HCT 23.5 (L) 2023    MCV 91 2023     2023     No results for input(s): "TSH", "FREET4" in the last 168 hours.  Lab Results   Component Value Date    HGBA1C 7.6 (H) 10/12/2023       Nutritional status:   Body mass index is 23.35 kg/m².  Lab Results   Component Value Date    ALBUMIN 3.8 11/10/2023    ALBUMIN 3.7 2023    ALBUMIN 3.1 (L) 2023     No results found for: "PREALBUMIN"    Estimated Creatinine Clearance: 30.4 mL/min (A) (based on SCr of 2.8 mg/dL (H)).    Accu-Checks  Recent Labs     23  0833 23  1234 23  1758 23  1945 23  0745 23  1137 23  1610 23  1928 23  0729 23  1241   POCTGLUCOSE 158* 229* 291* 263* 161* 288* 148* 230* 163* 220*       Current Medications and/or Treatments Impacting Glycemic Control  Immunotherapy:    Immunosuppressants       None          Steroids:   Hormones (From admission, onward)      None          Pressors:    Autonomic Drugs (From admission, onward)      None          Hyperglycemia/Diabetes Medications:   Antihyperglycemics (From admission, onward)      Start     Stop Route Frequency Ordered    23 1130  insulin aspart U-100 pen 6 " Units         -- SubQ 3 times daily with meals 11/12/23 0843    11/10/23 1602  insulin aspart U-100 pen 0-5 Units         -- SubQ Before meals & nightly PRN 11/10/23 1508

## 2023-11-13 NOTE — ASSESSMENT & PLAN NOTE
Mr. Radha Abbott is a 63 year old male with hx of CAD s/p 3v CABG (unclear anatomy, 2009), ICM with a recent EF of 15-20% s/p ICD (medtronik 2009), DM2 (a1c 7.7), HTN, HLD, Vfib on amio who presents to the ED with CC of SOB. CTS is being consulted for advanced options secondary to advanced heart failure. ECHO reveals EF 15-20%, LVIDD 7.1, TAPSE 1.87.  CT reviewed and does not preclude patient from advanced options.    Will need previous records from CABG at [facility] in 2009.    Dr. Washington to staff and give recommendations.

## 2023-11-13 NOTE — ASSESSMENT & PLAN NOTE
BG goal 140-180    Increase Novolog to 7 units TID with meals (20% dose increase)   Low Dose Correction Scale (given kidney function)  BG monitoring ac/hs    ** Please call Endocrine for any BG related issues **    Discharge plans: TBD    Lab Results   Component Value Date    HGBA1C 7.6 (H) 10/12/2023

## 2023-11-13 NOTE — SUBJECTIVE & OBJECTIVE
No current facility-administered medications on file prior to encounter.     Current Outpatient Medications on File Prior to Encounter   Medication Sig    amiodarone (PACERONE) 400 MG tablet Take 1 tablet (400 mg total) by mouth once daily.    apixaban (ELIQUIS) 5 mg Tab Take 1 tablet (5 mg total) by mouth 2 (two) times daily.    aspirin (ECOTRIN) 81 MG EC tablet Take 81 mg by mouth once daily.    atorvastatin (LIPITOR) 40 MG tablet Take 40 mg by mouth.    DOBUTamine (DOBUTREX) 1,000 mg/250 mL (4,000 mcg/mL) infusion Inject 389.5 mcg/min into the vein continuous.    fish oil-omega-3 fatty acids 300-1,000 mg capsule Take 2 g by mouth once daily.    furosemide (LASIX) 40 MG tablet Take 1 tablet (40 mg total) by mouth 2 (two) times daily.    gabapentin (NEURONTIN) 300 MG capsule Take 300 mg by mouth nightly as needed.    multivitamin capsule Take 1 capsule by mouth once daily.    pantoprazole (PROTONIX) 40 MG tablet Take 1 tablet (40 mg total) by mouth before breakfast.    potassium chloride SA (K-DUR,KLOR-CON) 20 MEQ tablet Take 1 tablet (20 mEq total) by mouth once daily.    sacubitriL-valsartan (ENTRESTO) 24-26 mg per tablet Take 1 tablet by mouth 2 (two) times daily.    metOLazone (ZAROXOLYN) 2.5 MG tablet Take 1 tablet (2.5 mg total) by mouth once daily. Thursday 11/9 and Saturday 11/10    torsemide (DEMADEX) 20 MG Tab Take 2 tablets (40 mg total) by mouth 2 (two) times a day.       Review of patient's allergies indicates:  No Known Allergies    Past Medical History:   Diagnosis Date    CAD (coronary artery disease)     Diabetes mellitus     HFrEF (heart failure with reduced ejection fraction)     ICD (implantable cardioverter-defibrillator) in place     MI, old      Past Surgical History:   Procedure Laterality Date    CARDIAC SURGERY      RIGHT HEART CATHETERIZATION Right 10/10/2023    Procedure: INSERTION, CATHETER, RIGHT HEART;  Surgeon: Bin Gandhi MD;  Location: [facility] CATH LAB;  Service: Cardiology;   Laterality: Right;    RIGHT HEART CATHETERIZATION Right 10/13/2023    Procedure: INSERTION, CATHETER, RIGHT HEART;  Surgeon: Walter Mcintyre MD;  Location: [facility] CATH LAB;  Service: Cardiology;  Laterality: Right;    RIGHT HEART CATHETERIZATION  11/13/2023     Family History    None       Tobacco Use    Smoking status: Every Day     Current packs/day: 0.50     Types: Cigarettes    Smokeless tobacco: Not on file   Substance and Sexual Activity    Alcohol use: Yes     Comment: rarely    Drug use: No    Sexual activity: Not on file     Review of Systems   Constitutional:  Positive for activity change and fatigue. Negative for appetite change and fever.   HENT:  Negative for nosebleeds.    Respiratory:  Positive for shortness of breath. Negative for cough.    Cardiovascular:  Negative for chest pain, palpitations and leg swelling.   Gastrointestinal:  Negative for abdominal distention, abdominal pain and nausea.   Genitourinary:  Negative for frequency.   Musculoskeletal:  Negative for arthralgias and myalgias.   Skin:  Negative for rash.   Neurological:  Negative for dizziness and numbness.   Hematological:  Does not bruise/bleed easily.     Objective:     Vital Signs (Most Recent):  Temp: 98.1 °F (36.7 °C) (11/13/23 1130)  Pulse: 79 (11/13/23 1130)  Resp: 18 (11/13/23 1130)  BP: 94/60 (11/13/23 1130)  SpO2: 100 % (11/13/23 1130) Vital Signs (24h Range):  Temp:  [96.5 °F (35.8 °C)-98.4 °F (36.9 °C)] 98.1 °F (36.7 °C)  Pulse:  [] 79  Resp:  [16-20] 18  SpO2:  [95 %-100 %] 100 %  BP: ()/(58-63) 94/60     Weight: 78.1 kg (172 lb 2.9 oz)  Body mass index is 23.35 kg/m².    SpO2: 100 %        Intake/Output - Last 3 Shifts         11/11 0700  11/12 0659 11/12 0700  11/13 0659 11/13 0700  11/14 0659    P.O. 980 840     I.V. (mL/kg)  20 (0.3)     Total Intake(mL/kg) 980 (12.5) 860 (11)     Urine (mL/kg/hr) 1350 (0.7) 1650 (0.9)     Stool 0      Total Output 1350 1650     Net -370 -790            Urine Occurrence 1 x       Stool Occurrence 1 x               Lines/Drains/Airways       Peripherally Inserted Central Catheter Line  Duration             PICC Double Lumen 10/13/23 1542 right basilic 31 days              Peripheral Intravenous Line  Duration                  Peripheral IV - Single Lumen 11/09/23 0656 20 G Left Antecubital 4 days                          Physical Exam  HENT:      Head: Normocephalic and atraumatic.   Eyes:      Extraocular Movements: Extraocular movements intact.   Cardiovascular:      Rate and Rhythm: Normal rate and regular rhythm.   Pulmonary:      Effort: Pulmonary effort is normal.   Abdominal:      General: Abdomen is flat.      Palpations: Abdomen is soft.   Musculoskeletal:         General: Normal range of motion.      Cervical back: Normal range of motion.      Comments: PICC line   Skin:     General: Skin is warm and dry.      Capillary Refill: Capillary refill takes less than 2 seconds.   Neurological:      General: No focal deficit present.            Significant Labs:  BMP:   Recent Labs   Lab 11/13/23  0504         K 3.9      CO2 22*   BUN 59*   CREATININE 2.8*   CALCIUM 9.0   MG 2.3     CBC:   Recent Labs   Lab 11/13/23  0504   WBC 4.24   RBC 2.59*   HGB 7.8*   HCT 23.5*      MCV 91   MCH 30.1   MCHC 33.2     CMP:   Recent Labs   Lab 11/13/23  0504      CALCIUM 9.0      K 3.9   CO2 22*      BUN 59*   CREATININE 2.8*     Coagulation:   Recent Labs   Lab 11/13/23  0504   APTT 55.9*       Significant Diagnostics:  BMI 23.35  Blood group A+  Cr 2.8 - baseline 2.1    Ct CAP:  Impression:     1. Cardiomegaly.  2. Innumerable diffuse subcentimeter ground-glass pulmonary nodules throughout both lungs.  Findings are favored to represent sequela of small airways infection/inflammation.  3. Cholelithiasis.  4. Significant calcific atherosclerosis of the abdominal aorta.  Several regions of linear calcification traverse the abdominal aortic lumen which may  represent irregular plaque or short segment dissections, incompletely evaluated on noncontrast exam.  5. Additional findings as above.    ECHO 10/19/2023    Left Ventricle: The left ventricle is severely dilated. Moderately increased ventricular mass. Normal wall thickness. There is moderate eccentric hypertrophy. regional wall motion abnormalities present. There is severely reduced systolic function with a visually estimated ejection fraction of 15 - 20%. Biplane (2D) method of discs ejection fraction is 18%. There is diastolic dysfunction.    Left Atrium: Left atrium is severely dilated.    Right Ventricle: Normal right ventricular cavity size. Systolic function is normal.    Aortic Valve: There is mild aortic valve sclerosis.    Mitral Valve: There is mild to moderate regurgitation.    IVC/SVC: Intermediate venous pressure at 8 mmHg.  LVIDD 7.1  TAPSE 1.87  I have reviewed and interpreted all pertinent imaging results/findings within the past 24 hours.

## 2023-11-13 NOTE — PROCEDURES
63-year-old male referred for right heart catheterization is currently on dobutamine 5.  He has stage D congestive heart failure and right heart catheterization was requested to guide treatment options.  The consent for this procedure has been or will be obtained by the service  prior to his coming to the catheterization laboratory.  I will; review the procedure with him at that time and confirm his consent.    History, hospital course, medications, labs, allergies were all reviewed    RHC Lab Post Procedure Note    Patient tolerated the procedure well.   RIJ  RHC performed on  5  RA 19/16/14 (correlates with side arm pressure in CM water)  RV 65/20  PA 70/35 (47)  PCWP a 32, V 45 (mean 32)  Wedge proven with fluoroscopy  PA sat 32 Checked twice  /69 (82); 93%  Ana CO 4.1  CI 2.1   There were no complications.   Please see full report in EPIC for details.

## 2023-11-13 NOTE — CONSULTS
[facility] - Cardiology Stepdown  Cardiothoracic Surgery  Consult Note    Patient Name: Radha Abbott  MRN: [id number]  Admission Date: 11/9/2023  Attending Physician: Juventino Bermudez Jr.*  Referring Provider: Self, Aaareferral    Patient information was obtained from patient, past medical records, and ER records.     Consults  Subjective:     Principal Problem: Acute on chronic combined systolic and diastolic CHF, NYHA class 4    History of Present Illness: Mr. Radha Abbott is a 63 year old male with hx of CAD s/p 3v CABG (unclear anatomy, 2009), ICM with a recent EF of 15-20% s/p ICD (medtronik 2009), DM2 (a1c 7.7), HTN, HLD, Vfib on amio who presents to the ED with CC of SOB     Pt was recently admitted to [facility] as a transfer.  He was started on a Lasix gtt and did well.  Started on gDMT and discharged home on  5 with plans to follow up in [facility] clinic for ongoing transplant evaluation at another facility.  He says that about a few days ago he started to notice LE swelling.  This turned into Nelson and orthopnea.  He says he can't walk to the bathroom without getting SOB.  He also complains of weight gain.  He takes torsemide 20mg BID at home and was told to trial additional Lasix which he did without any improvement.  He was rx metolazone but has not been filled.  He came to the ED     In the ED he was AF with stable VS on RA.  CBC showing chronic anemia.  CMP notable for acute on chronic CKD with baseline around 2.1 and Cr now 2.6.  BNP elevated.  Lactate neg.  CXR showing pulmonary edema.  I evaluated the pt at the bedside.  Bedside TTE showing CVP >15.  He was subsequently admitted to the CCU for diuresis.      He was continued on his home  and was started on a lasix gtt, which he responded well to overnight (net -1700cc. He feels much better this morning as well. Our transplant coordinators have been working on insurance approval and he is now being transferred to [facility] service for transplant evaluation.     CTS  is being consulted for advanced options secondary to advanced heart failure    No current facility-administered medications on file prior to encounter.     Current Outpatient Medications on File Prior to Encounter   Medication Sig    amiodarone (PACERONE) 400 MG tablet Take 1 tablet (400 mg total) by mouth once daily.    apixaban (ELIQUIS) 5 mg Tab Take 1 tablet (5 mg total) by mouth 2 (two) times daily.    aspirin (ECOTRIN) 81 MG EC tablet Take 81 mg by mouth once daily.    atorvastatin (LIPITOR) 40 MG tablet Take 40 mg by mouth.    DOBUTamine (DOBUTREX) 1,000 mg/250 mL (4,000 mcg/mL) infusion Inject 389.5 mcg/min into the vein continuous.    fish oil-omega-3 fatty acids 300-1,000 mg capsule Take 2 g by mouth once daily.    furosemide (LASIX) 40 MG tablet Take 1 tablet (40 mg total) by mouth 2 (two) times daily.    gabapentin (NEURONTIN) 300 MG capsule Take 300 mg by mouth nightly as needed.    multivitamin capsule Take 1 capsule by mouth once daily.    pantoprazole (PROTONIX) 40 MG tablet Take 1 tablet (40 mg total) by mouth before breakfast.    potassium chloride SA (K-DUR,KLOR-CON) 20 MEQ tablet Take 1 tablet (20 mEq total) by mouth once daily.    sacubitriL-valsartan (ENTRESTO) 24-26 mg per tablet Take 1 tablet by mouth 2 (two) times daily.    metOLazone (ZAROXOLYN) 2.5 MG tablet Take 1 tablet (2.5 mg total) by mouth once daily. Thursday 11/9 and Saturday 11/10    torsemide (DEMADEX) 20 MG Tab Take 2 tablets (40 mg total) by mouth 2 (two) times a day.       Review of patient's allergies indicates:  No Known Allergies    Past Medical History:   Diagnosis Date    CAD (coronary artery disease)     Diabetes mellitus     HFrEF (heart failure with reduced ejection fraction)     ICD (implantable cardioverter-defibrillator) in place     MI, old      Past Surgical History:   Procedure Laterality Date    CARDIAC SURGERY      RIGHT HEART CATHETERIZATION Right 10/10/2023    Procedure: INSERTION, CATHETER, RIGHT HEART;   Surgeon: Bin Gandhi MD;  Location: [facility] CATH LAB;  Service: Cardiology;  Laterality: Right;    RIGHT HEART CATHETERIZATION Right 10/13/2023    Procedure: INSERTION, CATHETER, RIGHT HEART;  Surgeon: Walter Mcintyre MD;  Location: [facility] CATH LAB;  Service: Cardiology;  Laterality: Right;    RIGHT HEART CATHETERIZATION  11/13/2023     Family History    None       Tobacco Use    Smoking status: Every Day     Current packs/day: 0.50     Types: Cigarettes    Smokeless tobacco: Not on file   Substance and Sexual Activity    Alcohol use: Yes     Comment: rarely    Drug use: No    Sexual activity: Not on file     Review of Systems   Constitutional:  Positive for activity change and fatigue. Negative for appetite change and fever.   HENT:  Negative for nosebleeds.    Respiratory:  Positive for shortness of breath. Negative for cough.    Cardiovascular:  Negative for chest pain, palpitations and leg swelling.   Gastrointestinal:  Negative for abdominal distention, abdominal pain and nausea.   Genitourinary:  Negative for frequency.   Musculoskeletal:  Negative for arthralgias and myalgias.   Skin:  Negative for rash.   Neurological:  Negative for dizziness and numbness.   Hematological:  Does not bruise/bleed easily.     Objective:     Vital Signs (Most Recent):  Temp: 98.1 °F (36.7 °C) (11/13/23 1130)  Pulse: 79 (11/13/23 1130)  Resp: 18 (11/13/23 1130)  BP: 94/60 (11/13/23 1130)  SpO2: 100 % (11/13/23 1130) Vital Signs (24h Range):  Temp:  [96.5 °F (35.8 °C)-98.4 °F (36.9 °C)] 98.1 °F (36.7 °C)  Pulse:  [] 79  Resp:  [16-20] 18  SpO2:  [95 %-100 %] 100 %  BP: ()/(58-63) 94/60     Weight: 78.1 kg (172 lb 2.9 oz)  Body mass index is 23.35 kg/m².    SpO2: 100 %        Intake/Output - Last 3 Shifts         11/11 0700  11/12 0659 11/12 0700  11/13 0659 11/13 0700  11/14 0659    P.O. 980 840     I.V. (mL/kg)  20 (0.3)     Total Intake(mL/kg) 980 (12.5) 860 (11)     Urine (mL/kg/hr) 1350 (0.7) 1650 (0.9)     Stool  0      Total Output 1350 1650     Net -370 -790            Urine Occurrence 1 x      Stool Occurrence 1 x               Lines/Drains/Airways       Peripherally Inserted Central Catheter Line  Duration             PICC Double Lumen 10/13/23 1542 right basilic 31 days              Peripheral Intravenous Line  Duration                  Peripheral IV - Single Lumen 11/09/23 0656 20 G Left Antecubital 4 days                          Physical Exam  HENT:      Head: Normocephalic and atraumatic.   Eyes:      Extraocular Movements: Extraocular movements intact.   Cardiovascular:      Rate and Rhythm: Normal rate and regular rhythm.   Pulmonary:      Effort: Pulmonary effort is normal.   Abdominal:      General: Abdomen is flat.      Palpations: Abdomen is soft.   Musculoskeletal:         General: Normal range of motion.      Cervical back: Normal range of motion.      Comments: PICC line   Skin:     General: Skin is warm and dry.      Capillary Refill: Capillary refill takes less than 2 seconds.   Neurological:      General: No focal deficit present.            Significant Labs:  BMP:   Recent Labs   Lab 11/13/23  0504         K 3.9      CO2 22*   BUN 59*   CREATININE 2.8*   CALCIUM 9.0   MG 2.3     CBC:   Recent Labs   Lab 11/13/23  0504   WBC 4.24   RBC 2.59*   HGB 7.8*   HCT 23.5*      MCV 91   MCH 30.1   MCHC 33.2     CMP:   Recent Labs   Lab 11/13/23  0504      CALCIUM 9.0      K 3.9   CO2 22*      BUN 59*   CREATININE 2.8*     Coagulation:   Recent Labs   Lab 11/13/23  0504   APTT 55.9*       Significant Diagnostics:  BMI 23.35  Blood group A+  Cr 2.8 - baseline 2.1    Ct CAP:  Impression:     1. Cardiomegaly.  2. Innumerable diffuse subcentimeter ground-glass pulmonary nodules throughout both lungs.  Findings are favored to represent sequela of small airways infection/inflammation.  3. Cholelithiasis.  4. Significant calcific atherosclerosis of the abdominal aorta.   Several regions of linear calcification traverse the abdominal aortic lumen which may represent irregular plaque or short segment dissections, incompletely evaluated on noncontrast exam.  5. Additional findings as above.    ECHO 10/19/2023    Left Ventricle: The left ventricle is severely dilated. Moderately increased ventricular mass. Normal wall thickness. There is moderate eccentric hypertrophy. regional wall motion abnormalities present. There is severely reduced systolic function with a visually estimated ejection fraction of 15 - 20%. Biplane (2D) method of discs ejection fraction is 18%. There is diastolic dysfunction.    Left Atrium: Left atrium is severely dilated.    Right Ventricle: Normal right ventricular cavity size. Systolic function is normal.    Aortic Valve: There is mild aortic valve sclerosis.    Mitral Valve: There is mild to moderate regurgitation.    IVC/SVC: Intermediate venous pressure at 8 mmHg.  LVIDD 7.1  TAPSE 1.87  I have reviewed and interpreted all pertinent imaging results/findings within the past 24 hours.    Assessment/Plan:     NYHA Score: NYHA III: marked limitation of physical activity, comfortable at rest    * Acute on chronic combined systolic and diastolic CHF, NYHA class 4  Mr. Radha Abbott is a 63 year old male with hx of CAD s/p 3v CABG (unclear anatomy, 2009), ICM with a recent EF of 15-20% s/p ICD (medtronik 2009), DM2 (a1c 7.7), HTN, HLD, Vfib on amio who presents to the ED with CC of SOB. CTS is being consulted for advanced options secondary to advanced heart failure. ECHO reveals EF 15-20%, LVIDD 7.1, TAPSE 1.87.  CT reviewed and does not preclude patient from advanced options.    Will need previous records from CABG at [facility] in 2009.    Dr. Washington to staff and give recommendations.        Thank you for your consult. I will follow-up with patient. Please contact us if you have any additional questions.    Jackie Ruff NP  Cardiothoracic  Surgery  [facility] - Cardiology Stepdown    Patient seen and pertinent studies were reviewed.  Patient has previous coronary artery bypass grafting done at [facility] in 2009.  CT scan does not preclude from advanced therapies.  Will request or operative note.  We will discuss candidacy at the multidisciplinary selection meeting.  Concern in fact include VFib on admission to the ED.

## 2023-11-13 NOTE — PROGRESS NOTES
[facility] - Cardiology Stepdown  Endocrinology  Progress Note    Admit Date: 2023     Reason for Consult: Management of T2DM, Hyperglycemia     Surgical Procedure and Date: n/a    Diabetes diagnosis year:     Home Diabetes Medications:  Metformin (off since October)   Lab Results   Component Value Date    HGBA1C 7.6 (H) 10/12/2023       How often checking glucose at home?  Once daily in the AM    BG readings on regimen: 150-160s  Hypoglycemia on the regimen?  No  Missed doses on regimen?  n/a    Diabetes Complications include:     Hyperglycemia    Complicating diabetes co morbidities:   CAD s/p CABG, HTN, HLD      HPI:   Patient is a 63 y.o. male with a diagnosis of CAD s/p 3v CABG (unclear anatomy, ), ICM with a recent EF of 15-20% s/p ICD (medtronik ), DM2 (a1c 7.7), HTN, HLD, Vfib on amio who presents to the ED with CC of SOB. In the ED he was AF with stable VS on RA.  CBC showing chronic anemia.  CMP notable for acute on chronic CKD with baseline around 2.1 and Cr now 2.6.  BNP elevated.  Lactate neg.  CXR showing pulmonary edema. He was subsequently admitted to the CCU for diuresis.  Endocrinology consulted for management of T2DM.          Interval HPI:   Overnight events: Remains in CSU. BG at or slightly above goal ranges on current SQ insulin regimen. Creatinine 2.8. Diet diabetic Cardiac (Low Na/Chol);  Calorie    Eatin%  Nausea: No  Hypoglycemia and intervention: No  Fever: No  TPN and/or TF: No  If yes, type of TF/TPN and rate: n/a    BP 94/60 (BP Location: Left arm, Patient Position: Sitting)   Pulse 79   Temp 98.1 °F (36.7 °C) (Oral)   Resp 18   Ht 6' (1.829 m)   Wt 78.1 kg (172 lb 2.9 oz)   SpO2 100%   BMI 23.35 kg/m²     Labs Reviewed and Include    Recent Labs   Lab 23  0504      CALCIUM 9.0      K 3.9   CO2 22*      BUN 59*   CREATININE 2.8*     Lab Results   Component Value Date    WBC 4.24 2023    HGB 7.8 (L) 2023    HCT 23.5  "(L) 11/13/2023    MCV 91 11/13/2023     11/13/2023     No results for input(s): "TSH", "FREET4" in the last 168 hours.  Lab Results   Component Value Date    HGBA1C 7.6 (H) 10/12/2023       Nutritional status:   Body mass index is 23.35 kg/m².  Lab Results   Component Value Date    ALBUMIN 3.8 11/10/2023    ALBUMIN 3.7 11/09/2023    ALBUMIN 3.1 (L) 11/01/2023     No results found for: "PREALBUMIN"    Estimated Creatinine Clearance: 30.4 mL/min (A) (based on SCr of 2.8 mg/dL (H)).    Accu-Checks  Recent Labs     11/11/23  0833 11/11/23  1234 11/11/23  1758 11/11/23  1945 11/12/23  0745 11/12/23  1137 11/12/23  1610 11/12/23  1928 11/13/23  0729 11/13/23  1241   POCTGLUCOSE 158* 229* 291* 263* 161* 288* 148* 230* 163* 220*       Current Medications and/or Treatments Impacting Glycemic Control  Immunotherapy:    Immunosuppressants       None          Steroids:   Hormones (From admission, onward)      None          Pressors:    Autonomic Drugs (From admission, onward)      None          Hyperglycemia/Diabetes Medications:   Antihyperglycemics (From admission, onward)      Start     Stop Route Frequency Ordered    11/12/23 1130  insulin aspart U-100 pen 6 Units         -- SubQ 3 times daily with meals 11/12/23 0843    11/10/23 1602  insulin aspart U-100 pen 0-5 Units         -- SubQ Before meals & nightly PRN 11/10/23 1503            ASSESSMENT and PLAN    Cardiac/Vascular  * Acute on chronic combined systolic and diastolic CHF, NYHA class 4  Managed per primary team  Optimize BG control      PAF (paroxysmal atrial fibrillation)  May increase insulin resistance.         Renal/  IVÁN (acute kidney injury)  Lab Results   Component Value Date    CREATININE 2.8 (H) 11/13/2023     Avoid insulin stacking  Titrate insulin slowly       Endocrine  Type 2 diabetes mellitus without complication, without long-term current use of insulin  BG goal 140-180    Increase Novolog to 7 units TID with meals (20% dose increase)   Low " Dose Correction Scale (given kidney function)  BG monitoring ac/hs    ** Please call Endocrine for any BG related issues **    Discharge plans: TBD    Lab Results   Component Value Date    HGBA1C 7.6 (H) 10/12/2023               Morena Escobar NP  Endocrinology  [facility] - Cardiology Stepdown

## 2023-11-14 ENCOUNTER — EXTERNAL HOME HEALTH (OUTPATIENT)
Dept: HOME HEALTH SERVICES | Facility: HOSPITAL | Age: 63
End: 2023-11-14
Payer: MEDICAID

## 2023-11-14 LAB
ALLENS TEST: ABNORMAL
ANION GAP SERPL CALC-SCNC: 12 MMOL/L (ref 8–16)
ANION GAP SERPL CALC-SCNC: 13 MMOL/L (ref 8–16)
APTT PPP: 55.8 SEC (ref 21–32)
APTT PPP: 58.1 SEC (ref 21–32)
BASOPHILS # BLD AUTO: 0.04 K/UL (ref 0–0.2)
BASOPHILS NFR BLD: 1 % (ref 0–1.9)
BUN SERPL-MCNC: 62 MG/DL (ref 8–23)
BUN SERPL-MCNC: 62 MG/DL (ref 8–23)
CALCIUM SERPL-MCNC: 9.1 MG/DL (ref 8.7–10.5)
CALCIUM SERPL-MCNC: 9.1 MG/DL (ref 8.7–10.5)
CHLORIDE SERPL-SCNC: 102 MMOL/L (ref 95–110)
CHLORIDE SERPL-SCNC: 98 MMOL/L (ref 95–110)
CO2 SERPL-SCNC: 22 MMOL/L (ref 23–29)
CO2 SERPL-SCNC: 23 MMOL/L (ref 23–29)
CREAT SERPL-MCNC: 3.1 MG/DL (ref 0.5–1.4)
CREAT SERPL-MCNC: 3.6 MG/DL (ref 0.5–1.4)
DELSYS: ABNORMAL
DIFFERENTIAL METHOD BLD: ABNORMAL
EOSINOPHIL # BLD AUTO: 0.1 K/UL (ref 0–0.5)
EOSINOPHIL NFR BLD: 1.9 % (ref 0–8)
ERYTHROCYTE [DISTWIDTH] IN BLOOD BY AUTOMATED COUNT: 14.7 % (ref 11.5–14.5)
EST. GFR  (NO RACE VARIABLE): 18.4 ML/MIN/1.73 M^2
EST. GFR  (NO RACE VARIABLE): 22 ML/MIN/1.73 M^2
GLUCOSE SERPL-MCNC: 121 MG/DL (ref 70–110)
GLUCOSE SERPL-MCNC: 187 MG/DL (ref 70–110)
HCO3 UR-SCNC: 24.7 MMOL/L (ref 24–28)
HCT VFR BLD AUTO: 23.5 % (ref 40–54)
HGB BLD-MCNC: 7.8 G/DL (ref 14–18)
IMM GRANULOCYTES # BLD AUTO: 0.02 K/UL (ref 0–0.04)
IMM GRANULOCYTES NFR BLD AUTO: 0.5 % (ref 0–0.5)
LYMPHOCYTES # BLD AUTO: 1 K/UL (ref 1–4.8)
LYMPHOCYTES NFR BLD: 23.3 % (ref 18–48)
MAGNESIUM SERPL-MCNC: 2.5 MG/DL (ref 1.6–2.6)
MCH RBC QN AUTO: 29.5 PG (ref 27–31)
MCHC RBC AUTO-ENTMCNC: 33.2 G/DL (ref 32–36)
MCV RBC AUTO: 89 FL (ref 82–98)
MODE: ABNORMAL
MONOCYTES # BLD AUTO: 0.3 K/UL (ref 0.3–1)
MONOCYTES NFR BLD: 6.7 % (ref 4–15)
NEUTROPHILS # BLD AUTO: 2.8 K/UL (ref 1.8–7.7)
NEUTROPHILS NFR BLD: 66.6 % (ref 38–73)
NRBC BLD-RTO: 0 /100 WBC
PCO2 BLDA: 39.3 MMHG (ref 35–45)
PH SMN: 7.41 [PH] (ref 7.35–7.45)
PHOSPHATE SERPL-MCNC: 4.9 MG/DL (ref 2.7–4.5)
PLATELET # BLD AUTO: 247 K/UL (ref 150–450)
PMV BLD AUTO: 10 FL (ref 9.2–12.9)
PO2 BLDA: 23 MMHG (ref 40–60)
POC BE: 0 MMOL/L
POC SATURATED O2: 41 % (ref 95–100)
POC TCO2: 26 MMOL/L (ref 24–29)
POCT GLUCOSE: 111 MG/DL (ref 70–110)
POCT GLUCOSE: 117 MG/DL (ref 70–110)
POCT GLUCOSE: 205 MG/DL (ref 70–110)
POCT GLUCOSE: 253 MG/DL (ref 70–110)
POTASSIUM SERPL-SCNC: 4.2 MMOL/L (ref 3.5–5.1)
POTASSIUM SERPL-SCNC: 4.3 MMOL/L (ref 3.5–5.1)
POTASSIUM SERPL-SCNC: 5.6 MMOL/L (ref 3.5–5.1)
RBC # BLD AUTO: 2.64 M/UL (ref 4.6–6.2)
SAMPLE: ABNORMAL
SITE: ABNORMAL
SODIUM SERPL-SCNC: 133 MMOL/L (ref 136–145)
SODIUM SERPL-SCNC: 137 MMOL/L (ref 136–145)
SP02: 100
WBC # BLD AUTO: 4.2 K/UL (ref 3.9–12.7)

## 2023-11-14 PROCEDURE — C1751 CATH, INF, PER/CENT/MIDLINE: HCPCS | Mod: NTX

## 2023-11-14 PROCEDURE — 02HV33Z INSERTION OF INFUSION DEVICE INTO SUPERIOR VENA CAVA, PERCUTANEOUS APPROACH: ICD-10-PCS | Performed by: INTERNAL MEDICINE

## 2023-11-14 PROCEDURE — 83735 ASSAY OF MAGNESIUM: CPT | Mod: NTX | Performed by: STUDENT IN AN ORGANIZED HEALTH CARE EDUCATION/TRAINING PROGRAM

## 2023-11-14 PROCEDURE — 80048 BASIC METABOLIC PNL TOTAL CA: CPT | Mod: 91,NTX | Performed by: PHYSICIAN ASSISTANT

## 2023-11-14 PROCEDURE — 99292 CRITICAL CARE ADDL 30 MIN: CPT | Mod: NTX,,, | Performed by: INTERNAL MEDICINE

## 2023-11-14 PROCEDURE — 25000003 PHARM REV CODE 250: Mod: NTX | Performed by: PHYSICIAN ASSISTANT

## 2023-11-14 PROCEDURE — 85730 THROMBOPLASTIN TIME PARTIAL: CPT | Mod: 91,NTX | Performed by: INTERNAL MEDICINE

## 2023-11-14 PROCEDURE — 63600175 PHARM REV CODE 636 W HCPCS: Mod: NTX | Performed by: STUDENT IN AN ORGANIZED HEALTH CARE EDUCATION/TRAINING PROGRAM

## 2023-11-14 PROCEDURE — 25000003 PHARM REV CODE 250: Mod: NTX | Performed by: INTERNAL MEDICINE

## 2023-11-14 PROCEDURE — 85730 THROMBOPLASTIN TIME PARTIAL: CPT | Mod: NTX | Performed by: STUDENT IN AN ORGANIZED HEALTH CARE EDUCATION/TRAINING PROGRAM

## 2023-11-14 PROCEDURE — 82803 BLOOD GASES ANY COMBINATION: CPT | Mod: NTX

## 2023-11-14 PROCEDURE — 99291 CRITICAL CARE FIRST HOUR: CPT | Mod: FS,NTX,, | Performed by: PHYSICIAN ASSISTANT

## 2023-11-14 PROCEDURE — 25000003 PHARM REV CODE 250: Mod: NTX | Performed by: STUDENT IN AN ORGANIZED HEALTH CARE EDUCATION/TRAINING PROGRAM

## 2023-11-14 PROCEDURE — 84100 ASSAY OF PHOSPHORUS: CPT | Mod: NTX | Performed by: STUDENT IN AN ORGANIZED HEALTH CARE EDUCATION/TRAINING PROGRAM

## 2023-11-14 PROCEDURE — 99232 SBSQ HOSP IP/OBS MODERATE 35: CPT | Mod: NTX,,, | Performed by: PHYSICIAN ASSISTANT

## 2023-11-14 PROCEDURE — 80048 BASIC METABOLIC PNL TOTAL CA: CPT | Mod: NTX | Performed by: STUDENT IN AN ORGANIZED HEALTH CARE EDUCATION/TRAINING PROGRAM

## 2023-11-14 PROCEDURE — 20000000 HC ICU ROOM: Mod: NTX

## 2023-11-14 PROCEDURE — 36410 VNPNXR 3YR/> PHY/QHP DX/THER: CPT | Mod: NTX

## 2023-11-14 PROCEDURE — 85025 COMPLETE CBC W/AUTO DIFF WBC: CPT | Mod: NTX | Performed by: STUDENT IN AN ORGANIZED HEALTH CARE EDUCATION/TRAINING PROGRAM

## 2023-11-14 PROCEDURE — 76937 US GUIDE VASCULAR ACCESS: CPT | Mod: NTX

## 2023-11-14 PROCEDURE — 94761 N-INVAS EAR/PLS OXIMETRY MLT: CPT | Mod: NTX,XB

## 2023-11-14 PROCEDURE — 84132 ASSAY OF SERUM POTASSIUM: CPT | Mod: NTX | Performed by: INTERNAL MEDICINE

## 2023-11-14 RX ORDER — AMOXICILLIN 250 MG
2 CAPSULE ORAL 2 TIMES DAILY
Status: DISCONTINUED | OUTPATIENT
Start: 2023-11-14 | End: 2023-11-19

## 2023-11-14 RX ORDER — POLYETHYLENE GLYCOL 3350 17 G/17G
17 POWDER, FOR SOLUTION ORAL ONCE
Status: COMPLETED | OUTPATIENT
Start: 2023-11-14 | End: 2023-11-14

## 2023-11-14 RX ADMIN — GABAPENTIN 300 MG: 300 CAPSULE ORAL at 09:11

## 2023-11-14 RX ADMIN — HEPARIN SODIUM AND DEXTROSE 16 UNITS/KG/HR: 10000; 5 INJECTION INTRAVENOUS at 06:11

## 2023-11-14 RX ADMIN — INSULIN ASPART 2 UNITS: 100 INJECTION, SOLUTION INTRAVENOUS; SUBCUTANEOUS at 12:11

## 2023-11-14 RX ADMIN — ATORVASTATIN CALCIUM 40 MG: 40 TABLET, FILM COATED ORAL at 08:11

## 2023-11-14 RX ADMIN — FUROSEMIDE 20 MG/HR: 10 INJECTION, SOLUTION INTRAMUSCULAR; INTRAVENOUS at 07:11

## 2023-11-14 RX ADMIN — FUROSEMIDE 20 MG/HR: 10 INJECTION, SOLUTION INTRAMUSCULAR; INTRAVENOUS at 08:11

## 2023-11-14 RX ADMIN — POLYETHYLENE GLYCOL 3350 17 G: 17 POWDER, FOR SOLUTION ORAL at 11:11

## 2023-11-14 RX ADMIN — ASPIRIN 81 MG: 81 TABLET, COATED ORAL at 08:11

## 2023-11-14 RX ADMIN — ACETAMINOPHEN 650 MG: 325 TABLET ORAL at 05:11

## 2023-11-14 RX ADMIN — SENNOSIDES AND DOCUSATE SODIUM 2 TABLET: 8.6; 5 TABLET ORAL at 11:11

## 2023-11-14 RX ADMIN — THERA TABS 1 TABLET: TAB at 08:11

## 2023-11-14 RX ADMIN — INSULIN ASPART 1 UNITS: 100 INJECTION, SOLUTION INTRAVENOUS; SUBCUTANEOUS at 09:11

## 2023-11-14 RX ADMIN — DOBUTAMINE IN DEXTROSE 5 MCG/KG/MIN: 400 INJECTION, SOLUTION INTRAVENOUS at 08:11

## 2023-11-14 RX ADMIN — INSULIN ASPART 7 UNITS: 100 INJECTION, SOLUTION INTRAVENOUS; SUBCUTANEOUS at 12:11

## 2023-11-14 RX ADMIN — HYDRALAZINE HYDROCHLORIDE 25 MG: 25 TABLET, FILM COATED ORAL at 09:11

## 2023-11-14 RX ADMIN — Medication 400 MG: at 08:11

## 2023-11-14 RX ADMIN — AMIODARONE HYDROCHLORIDE 400 MG: 200 TABLET ORAL at 08:11

## 2023-11-14 RX ADMIN — INSULIN ASPART 7 UNITS: 100 INJECTION, SOLUTION INTRAVENOUS; SUBCUTANEOUS at 07:11

## 2023-11-14 RX ADMIN — PANTOPRAZOLE SODIUM 40 MG: 40 TABLET, DELAYED RELEASE ORAL at 06:11

## 2023-11-14 RX ADMIN — HYDRALAZINE HYDROCHLORIDE 25 MG: 25 TABLET, FILM COATED ORAL at 02:11

## 2023-11-14 RX ADMIN — INSULIN ASPART 7 UNITS: 100 INJECTION, SOLUTION INTRAVENOUS; SUBCUTANEOUS at 05:11

## 2023-11-14 NOTE — SUBJECTIVE & OBJECTIVE
"Interval HPI:   No acute events overnight. Patient in room [id number] A. Blood glucose stable. BG at and above goal on current insulin regimen (SSI and prandial insulin). Steroid use- None .   1 Day Post-Op  Renal function- Abnormal -   Vasopressors-  None     Diet diabetic Cardiac (Low Na/Chol);  Calorie       Eatin%  Nausea: No  Hypoglycemia and intervention: No  Fever: No  TPN and/or TF: No    /64 (BP Location: Left arm, Patient Position: Lying)   Pulse 80   Temp 98 °F (36.7 °C) (Oral)   Resp 20   Ht 6' (1.829 m)   Wt 77.5 kg (170 lb 13.7 oz)   SpO2 100%   BMI 23.17 kg/m²     Labs Reviewed and Include    Recent Labs   Lab 23  0435   *   CALCIUM 9.1      K 4.2   CO2 23      BUN 62*   CREATININE 3.1*     Lab Results   Component Value Date    WBC 4.20 2023    HGB 7.8 (L) 2023    HCT 23.5 (L) 2023    MCV 89 2023     2023     No results for input(s): "TSH", "FREET4" in the last 168 hours.  Lab Results   Component Value Date    HGBA1C 7.6 (H) 10/12/2023       Nutritional status:   Body mass index is 23.17 kg/m².  Lab Results   Component Value Date    ALBUMIN 3.8 11/10/2023    ALBUMIN 3.7 2023    ALBUMIN 3.1 (L) 2023     No results found for: "PREALBUMIN"    Estimated Creatinine Clearance: 27.4 mL/min (A) (based on SCr of 3.1 mg/dL (H)).    Accu-Checks  Recent Labs     23  1945 23  0745 23  1137 23  1610 23  1928 23  0729 23  1241 23  1741 23  2206 23  0745   POCTGLUCOSE 263* 161* 288* 148* 230* 163* 220* 212* 181* 111*       Current Medications and/or Treatments Impacting Glycemic Control  Immunotherapy:    Immunosuppressants       None          Steroids:   Hormones (From admission, onward)      None          Pressors:    Autonomic Drugs (From admission, onward)      None          Hyperglycemia/Diabetes Medications:   Antihyperglycemics (From admission, " onward)      Start     Stop Route Frequency Ordered    11/13/23 1645  insulin aspart U-100 pen 7 Units         -- SubQ 3 times daily with meals 11/13/23 1444    11/10/23 1602  insulin aspart U-100 pen 0-5 Units         -- SubQ Before meals & nightly PRN 11/10/23 1509

## 2023-11-14 NOTE — SUBJECTIVE & OBJECTIVE
Interval History: Yesterday afternoon went to [facility] which found elevated BiV filling pressures (RA 14, PA 70/35, PCWP 32), upgraded to ICU. Feels well this AM denies SOB, N/V. On lasix 20 mg/hr plus got 500 mg Diuril. Good UOP overnight. Remains on  5. sCr uptrending slowly. On going workup for VAD/OHTx potential need for temporary MCS being discussed.     CVP this AM by PICC was 8. No VBG as PICC line not drawing back.     Continuous Infusions:   sodium chloride 0.9%      DOBUTamine 5 mcg/kg/min (11/14/23 1105)    furosemide (LASIX) 500 mg in 50 mL infusion (conc: 10 mg/mL) 20 mg/hr (11/14/23 1105)    heparin (porcine) in D5W 16.031 Units/kg/hr (11/14/23 1105)     Scheduled Meds:   amiodarone  400 mg Oral Daily    aspirin  81 mg Oral Daily    atorvastatin  40 mg Oral Daily    hydrALAZINE  25 mg Oral Q8H    insulin aspart U-100  7 Units Subcutaneous TIDWM    magnesium oxide  400 mg Oral Daily    multivitamin  1 tablet Oral Daily    pantoprazole  40 mg Oral Before breakfast    potassium chloride  40 mEq Oral BID     PRN Meds:sodium chloride 0.9%, acetaminophen, dextrose 10%, dextrose 10%, gabapentin, glucagon (human recombinant), glucose, glucose, heparin (PORCINE), heparin (PORCINE), insulin aspart U-100    Review of patient's allergies indicates:  No Known Allergies  Objective:     Vital Signs (Most Recent):  Temp: 97.9 °F (36.6 °C) (11/14/23 1105)  Pulse: 83 (11/14/23 1105)  Resp: 17 (11/14/23 1105)  BP: 109/63 (11/14/23 1105)  SpO2: 99 % (11/14/23 1105) Vital Signs (24h Range):  Temp:  [97.9 °F (36.6 °C)-98.1 °F (36.7 °C)] 97.9 °F (36.6 °C)  Pulse:  [76-87] 83  Resp:  [13-29] 17  SpO2:  [95 %-100 %] 99 %  BP: ()/(50-71) 109/63     Patient Vitals for the past 72 hrs (Last 3 readings):   Weight   11/14/23 0300 77.5 kg (170 lb 13.7 oz)   11/12/23 0905 78.1 kg (172 lb 2.9 oz)   11/12/23 0615 78.1 kg (172 lb 2.9 oz)       Body mass index is 23.17 kg/m².      Intake/Output Summary (Last 24 hours) at 11/14/2023  1125  Last data filed at 11/14/2023 1105  Gross per 24 hour   Intake 1943.99 ml   Output 2735 ml   Net -791.01 ml         Hemodynamic Parameters:  CVP:  [12 mmHg-15 mmHg] 12 mmHg         Physical Exam  Constitutional:       Comments: Alert, Oriented, No acute distress   HENT:      Head: Normocephalic and atraumatic.   Eyes:      Conjunctiva/sclera: Conjunctivae normal.   Neck:      Vascular: No hepatojugular reflux or JVD.      Comments: JVP does not appear elevated.   Cardiovascular:      Rate and Rhythm: Normal rate and regular rhythm.   Pulmonary:      Comments: Normal effort, Clear to auscultation   Abdominal:      Comments: Soft, Non-tender, Non-distended   Musculoskeletal:      Cervical back: Normal range of motion.      Comments: No peripheral edema   Skin:     Comments: Dry, Intact, Warm, Capillary refill <2 seconds.    Neurological:      Mental Status: He is alert and oriented to person, place, and time.      Comments: Normal speech   Psychiatric:         Mood and Affect: Mood and affect normal.            Significant Labs:  CBC:  Recent Labs   Lab 11/12/23  0603 11/13/23  0504 11/14/23  0435   WBC 3.61* 4.24 4.20   RBC 2.61* 2.59* 2.64*   HGB 7.7* 7.8* 7.8*   HCT 23.8* 23.5* 23.5*    253 247   MCV 91 91 89   MCH 29.5 30.1 29.5   MCHC 32.4 33.2 33.2       BNP:  Recent Labs   Lab 11/09/23  0656   BNP 1,204*       CMP:  Recent Labs   Lab 11/09/23  0656 11/09/23  1607 11/10/23  1816 11/11/23  0508 11/12/23  1911 11/13/23  0504 11/14/23  0435   *   < > 116*   < > 234* 104 121*   CALCIUM 9.5   < > 9.2   < > 8.7 9.0 9.1   ALBUMIN 3.7  --  3.8  --   --   --   --    PROT 7.5  --  7.3  --   --   --   --    *   < > 138   < > 134* 138 137   K 4.9   < > 4.8   < > 4.9 3.9 4.2   CO2 21*   < > 21*   < > 19* 22* 23      < > 103   < > 104 102 102   BUN 57*   < > 57*   < > 58* 59* 62*   CREATININE 2.6*   < > 2.5*   < > 2.9* 2.8* 3.1*   ALKPHOS 85  --  89  --   --   --   --    ALT 42  --  37  --   --  "  --   --    AST 26  --  19  --   --   --   --    BILITOT 0.8  --  1.0  --   --   --   --     < > = values in this interval not displayed.        Coagulation:   Recent Labs   Lab 11/11/23  0958 11/11/23  1600 11/13/23  1738 11/14/23  0042 11/14/23  0435   INR 1.2  --   --   --   --    APTT 31.3   < > 33.3* 58.1* 55.8*    < > = values in this interval not displayed.       LDH:  No results for input(s): "LDH" in the last 72 hours.    Microbiology:  Microbiology Results (last 7 days)       ** No results found for the last 168 hours. **            I have reviewed all pertinent labs within the past 24 hours.    Estimated Creatinine Clearance: 27.4 mL/min (A) (based on SCr of 3.1 mg/dL (H)).    Diagnostic Results:  I have reviewed all pertinent imaging results/findings within the past 24 hours.  "

## 2023-11-14 NOTE — ASSESSMENT & PLAN NOTE
Pt with known ICM with an EF of 25% on home  presenting with decompensated HF.  Not in cardiogenic shock    RHC 11/14: RA 14, PA 70/35, PCWP 32, CO 4.1, CI 2.1.     - Home  gtt at 5  - home GDMT: entresto 24/26mg BID (on hold 2/2 IVÁN), Hydralazine 25 q8h being continued  - hold home diuretics: torsemide 20mg BID and metolazone 2.5mg PRN  - Contine  5 and Lasix at 20 mg/hr as long as good UOP. Based on RHC findings above discussions being had with CTS regarding upgrading support to temporary MCS as bridge to advanced therapies.l   - Continue VAD/OHTx workup

## 2023-11-14 NOTE — ASSESSMENT & PLAN NOTE
Lab Results   Component Value Date    CREATININE 3.1 (H) 11/14/2023     Avoid insulin stacking  Titrate insulin slowly

## 2023-11-14 NOTE — PLAN OF CARE
HTS Care Update Note     CVP: 15  SVO2: 39   CI: 2.15  CO: 4.29  SVR: 1250    UO: 35 cc/h net +670    Infusions:  5, lasix gtt 20, hep gtt     Plan:  IV Diuril 500 x 1 dose for high CVP   Plan discussed with attending     Omid Manzanares MD  Cardiovascular Disease PGY-V  [facility]

## 2023-11-14 NOTE — ASSESSMENT & PLAN NOTE
BG goal 140-180   T2DM.  BG at or above goal.  Will continue to monitor on regimen increased yesterday.    Continue Novolog 7 units TID with meals   Low Dose Correction Scale (given kidney function)  BG monitoring ac/hs    ** Please call Endocrine for any BG related issues **    Discharge plans: TBD    Lab Results   Component Value Date    HGBA1C 7.6 (H) 10/12/2023

## 2023-11-14 NOTE — PROGRESS NOTES
[facility] - Cardiac Intensive Care  Endocrinology  Progress Note    Admit Date: 2023     Reason for Consult: Management of T2DM, Hyperglycemia     Surgical Procedure and Date: n/a    Diabetes diagnosis year:     Home Diabetes Medications:  Metformin (off since October)   Lab Results   Component Value Date    HGBA1C 7.6 (H) 10/12/2023       How often checking glucose at home?  Once daily in the AM    BG readings on regimen: 150-160s  Hypoglycemia on the regimen?  No  Missed doses on regimen?  n/a    Diabetes Complications include:     Hyperglycemia    Complicating diabetes co morbidities:   CAD s/p CABG, HTN, HLD      HPI:   Patient is a 63 y.o. male with a diagnosis of CAD s/p 3v CABG (unclear anatomy, ), ICM with a recent EF of 15-20% s/p ICD (medtronik ), DM2 (a1c 7.7), HTN, HLD, Vfib on amio who presents to the ED with CC of SOB. In the ED he was AF with stable VS on RA.  CBC showing chronic anemia.  CMP notable for acute on chronic CKD with baseline around 2.1 and Cr now 2.6.  BNP elevated.  Lactate neg.  CXR showing pulmonary edema. He was subsequently admitted to the CCU for diuresis.  Endocrinology consulted for management of T2DM.          Interval HPI:   No acute events overnight. Patient in room [id number] A. Blood glucose stable. BG at and above goal on current insulin regimen (SSI and prandial insulin). Steroid use- None .   1 Day Post-Op  Renal function- Abnormal -   Vasopressors-  None     Diet diabetic Cardiac (Low Na/Chol);  Calorie       Eatin%  Nausea: No  Hypoglycemia and intervention: No  Fever: No  TPN and/or TF: No    /64 (BP Location: Left arm, Patient Position: Lying)   Pulse 80   Temp 98 °F (36.7 °C) (Oral)   Resp 20   Ht 6' (1.829 m)   Wt 77.5 kg (170 lb 13.7 oz)   SpO2 100%   BMI 23.17 kg/m²     Labs Reviewed and Include    Recent Labs   Lab 23  3733   *   CALCIUM 9.1      K 4.2   CO2 23      BUN 62*   CREATININE 3.1*  "    Lab Results   Component Value Date    WBC 4.20 11/14/2023    HGB 7.8 (L) 11/14/2023    HCT 23.5 (L) 11/14/2023    MCV 89 11/14/2023     11/14/2023     No results for input(s): "TSH", "FREET4" in the last 168 hours.  Lab Results   Component Value Date    HGBA1C 7.6 (H) 10/12/2023       Nutritional status:   Body mass index is 23.17 kg/m².  Lab Results   Component Value Date    ALBUMIN 3.8 11/10/2023    ALBUMIN 3.7 11/09/2023    ALBUMIN 3.1 (L) 11/01/2023     No results found for: "PREALBUMIN"    Estimated Creatinine Clearance: 27.4 mL/min (A) (based on SCr of 3.1 mg/dL (H)).    Accu-Checks  Recent Labs     11/11/23  1945 11/12/23  0745 11/12/23  1137 11/12/23  1610 11/12/23  1928 11/13/23  0729 11/13/23  1241 11/13/23  1741 11/13/23  2206 11/14/23  0745   POCTGLUCOSE 263* 161* 288* 148* 230* 163* 220* 212* 181* 111*       Current Medications and/or Treatments Impacting Glycemic Control  Immunotherapy:    Immunosuppressants       None          Steroids:   Hormones (From admission, onward)      None          Pressors:    Autonomic Drugs (From admission, onward)      None          Hyperglycemia/Diabetes Medications:   Antihyperglycemics (From admission, onward)      Start     Stop Route Frequency Ordered    11/13/23 1645  insulin aspart U-100 pen 7 Units         -- SubQ 3 times daily with meals 11/13/23 1444    11/10/23 1602  insulin aspart U-100 pen 0-5 Units         -- SubQ Before meals & nightly PRN 11/10/23 1503            ASSESSMENT and PLAN    Cardiac/Vascular  * Acute on chronic combined systolic and diastolic CHF, NYHA class 4  Managed per primary team  Optimize BG control      Renal/  IVÁN (acute kidney injury)  Lab Results   Component Value Date    CREATININE 3.1 (H) 11/14/2023     Avoid insulin stacking  Titrate insulin slowly       Endocrine  Type 2 diabetes mellitus without complication, without long-term current use of insulin  BG goal 140-180   T2DM.  BG at or above goal.  Will continue to " monitor on regimen increased yesterday.    Continue Novolog 7 units TID with meals   Low Dose Correction Scale (given kidney function)  BG monitoring ac/hs    ** Please call Endocrine for any BG related issues **    Discharge plans: TBD    Lab Results   Component Value Date    HGBA1C 7.6 (H) 10/12/2023               Albert Wilson PA-C  Endocrinology  [facility] - Cardiac Intensive Care

## 2023-11-14 NOTE — PROCEDURES
RAPID RESPONSE VASCULAR ACCESS NOTE       Single lumen 20G, 10CM midline placed in the left brachial vein. Needle advanced into the vessel under real time ultrasound guidance.    Max dwell date: 12/13/23  Lot number: [id number]

## 2023-11-14 NOTE — PROGRESS NOTES
Interdisciplinary Rounds Report:   Attended interdisciplinary rounds with the [facility]/CTS services including the LVAD Coordinators, social workers, cardiologists, surgeons,  PT/OT/Speech, dietician, and unit charge nurses. Discussed patient status, plan of care, goals of care, including DC date, and post discharge needs. Plan of care will be discussed with the patient and/or family per the physician while rounding on the floor. This is a recurring meeting that is medically and socially necessary to collaborate with the interdisciplinary team to assist patient needs and safe discharge.

## 2023-11-14 NOTE — PROGRESS NOTES
[facility] - Cardiac Intensive Care  Heart Transplant  Progress Note    Patient Name: Radha Abbott  MRN: [id number]  Admission Date: 11/9/2023  Hospital Length of Stay: 5 days  Attending Physician: Sandhya Price MD  Primary Care Provider: Vasu Kong MD  Principal Problem:Acute on chronic combined systolic and diastolic CHF, NYHA class 4    Subjective:   Interval History: Yesterday afternoon went to [facility] which found elevated BiV filling pressures (RA 14, PA 70/35, PCWP 32), upgraded to ICU. Feels well this AM denies SOB, N/V. On lasix 20 mg/hr plus got 500 mg Diuril. Good UOP overnight. Remains on  5. sCr uptrending slowly. On going workup for VAD/OHTx potential need for temporary MCS being discussed.     CVP this AM by PICC was 8. No VBG as PICC line not drawing back.     Continuous Infusions:   sodium chloride 0.9%      DOBUTamine 5 mcg/kg/min (11/14/23 1105)    furosemide (LASIX) 500 mg in 50 mL infusion (conc: 10 mg/mL) 20 mg/hr (11/14/23 1105)    heparin (porcine) in D5W 16.031 Units/kg/hr (11/14/23 1105)     Scheduled Meds:   amiodarone  400 mg Oral Daily    aspirin  81 mg Oral Daily    atorvastatin  40 mg Oral Daily    hydrALAZINE  25 mg Oral Q8H    insulin aspart U-100  7 Units Subcutaneous TIDWM    magnesium oxide  400 mg Oral Daily    multivitamin  1 tablet Oral Daily    pantoprazole  40 mg Oral Before breakfast    potassium chloride  40 mEq Oral BID     PRN Meds:sodium chloride 0.9%, acetaminophen, dextrose 10%, dextrose 10%, gabapentin, glucagon (human recombinant), glucose, glucose, heparin (PORCINE), heparin (PORCINE), insulin aspart U-100    Review of patient's allergies indicates:  No Known Allergies  Objective:     Vital Signs (Most Recent):  Temp: 97.9 °F (36.6 °C) (11/14/23 1105)  Pulse: 83 (11/14/23 1105)  Resp: 17 (11/14/23 1105)  BP: 109/63 (11/14/23 1105)  SpO2: 99 % (11/14/23 1105) Vital Signs (24h Range):  Temp:  [97.9 °F (36.6 °C)-98.1 °F (36.7 °C)] 97.9 °F (36.6 °C)  Pulse:   [76-87] 83  Resp:  [13-29] 17  SpO2:  [95 %-100 %] 99 %  BP: ()/(50-71) 109/63     Patient Vitals for the past 72 hrs (Last 3 readings):   Weight   11/14/23 0300 77.5 kg (170 lb 13.7 oz)   11/12/23 0905 78.1 kg (172 lb 2.9 oz)   11/12/23 0615 78.1 kg (172 lb 2.9 oz)       Body mass index is 23.17 kg/m².      Intake/Output Summary (Last 24 hours) at 11/14/2023 1125  Last data filed at 11/14/2023 1105  Gross per 24 hour   Intake 1943.99 ml   Output 2735 ml   Net -791.01 ml         Hemodynamic Parameters:  CVP:  [12 mmHg-15 mmHg] 12 mmHg         Physical Exam  Constitutional:       Comments: Alert, Oriented, No acute distress   HENT:      Head: Normocephalic and atraumatic.   Eyes:      Conjunctiva/sclera: Conjunctivae normal.   Neck:      Vascular: No hepatojugular reflux or JVD.      Comments: JVP does not appear elevated.   Cardiovascular:      Rate and Rhythm: Normal rate and regular rhythm.   Pulmonary:      Comments: Normal effort, Clear to auscultation   Abdominal:      Comments: Soft, Non-tender, Non-distended   Musculoskeletal:      Cervical back: Normal range of motion.      Comments: No peripheral edema   Skin:     Comments: Dry, Intact, Warm, Capillary refill <2 seconds.    Neurological:      Mental Status: He is alert and oriented to person, place, and time.      Comments: Normal speech   Psychiatric:         Mood and Affect: Mood and affect normal.            Significant Labs:  CBC:  Recent Labs   Lab 11/12/23  0603 11/13/23  0504 11/14/23  0435   WBC 3.61* 4.24 4.20   RBC 2.61* 2.59* 2.64*   HGB 7.7* 7.8* 7.8*   HCT 23.8* 23.5* 23.5*    253 247   MCV 91 91 89   MCH 29.5 30.1 29.5   MCHC 32.4 33.2 33.2       BNP:  Recent Labs   Lab 11/09/23  0656   BNP 1,204*       CMP:  Recent Labs   Lab 11/09/23  0656 11/09/23  1607 11/10/23  1816 11/11/23  0508 11/12/23  1911 11/13/23  0504 11/14/23  0435   *   < > 116*   < > 234* 104 121*   CALCIUM 9.5   < > 9.2   < > 8.7 9.0 9.1   ALBUMIN 3.7  --   "3.8  --   --   --   --    PROT 7.5  --  7.3  --   --   --   --    *   < > 138   < > 134* 138 137   K 4.9   < > 4.8   < > 4.9 3.9 4.2   CO2 21*   < > 21*   < > 19* 22* 23      < > 103   < > 104 102 102   BUN 57*   < > 57*   < > 58* 59* 62*   CREATININE 2.6*   < > 2.5*   < > 2.9* 2.8* 3.1*   ALKPHOS 85  --  89  --   --   --   --    ALT 42  --  37  --   --   --   --    AST 26  --  19  --   --   --   --    BILITOT 0.8  --  1.0  --   --   --   --     < > = values in this interval not displayed.        Coagulation:   Recent Labs   Lab 11/11/23  0958 11/11/23  1600 11/13/23  1738 11/14/23  0042 11/14/23  0435   INR 1.2  --   --   --   --    APTT 31.3   < > 33.3* 58.1* 55.8*    < > = values in this interval not displayed.       LDH:  No results for input(s): "LDH" in the last 72 hours.    Microbiology:  Microbiology Results (last 7 days)       ** No results found for the last 168 hours. **            I have reviewed all pertinent labs within the past 24 hours.    Estimated Creatinine Clearance: 27.4 mL/min (A) (based on SCr of 3.1 mg/dL (H)).    Diagnostic Results:  I have reviewed all pertinent imaging results/findings within the past 24 hours.  Assessment and Plan:     63 year old male with hx of CAD s/p 3v CABG (unclear anatomy, 2009), ICM with a recent EF of 15-20% s/p ICD (medtronik 2009), DM2 (a1c 7.7), HTN, HLD, Vfib on amio who presents to the ED with CC of SOB     Pt was recently admitted to [facility] as a transfer.  He was started on a Lasix gtt and did well.  Started on gDMT and discharged home on  5 with plans to follow up in HTS clinic for ongoing transplant evaluation at another facility.  He says that about a few days ago he started to notice LE swelling.  This turned into Nelson and orthopnea.  He says he can't walk to the bathroom without getting SOB.  He also complains of weight gain.  He takes torsemide 20mg BID at home and was told to trial additional Lasix which he did without any improvement.  He " was rx metolazone but has not been filled.  He came to the ED     In the ED he was AF with stable VS on RA.  CBC showing chronic anemia.  CMP notable for acute on chronic CKD with baseline around 2.1 and Cr now 2.6.  BNP elevated.  Lactate neg.  CXR showing pulmonary edema.  I evaluated the pt at the bedside.  Bedside TTE showing CVP >15.  He was subsequently admitted to the CCU for diuresis.     He was continued on his home  and was started on a lasix gtt, which he responded well to overnight (net -1700cc. He feels much better this morning as well. Our transplant coordinators have been working on insurance approval and he is now being transferred to [facility] service for transplant evaluation.     * Acute on chronic combined systolic and diastolic CHF, NYHA class 4  Pt with known ICM with an EF of 25% on home  presenting with decompensated HF.  Not in cardiogenic shock    RHC 11/14: RA 14, PA 70/35, PCWP 32, CO 4.1, CI 2.1.     - Home  gtt at 5  - home GDMT: entresto 24/26mg BID (on hold 2/2 IVÁN), Hydralazine 25 q8h being continued  - hold home diuretics: torsemide 20mg BID and metolazone 2.5mg PRN  - Contine  5 and Lasix at 20 mg/hr as long as good UOP. Based on RHC findings above discussions being had with CTS regarding upgrading support to temporary MCS as bridge to advanced therapies.l   - Continue VAD/OHTx workup    PAF (paroxysmal atrial fibrillation)  Known hx of pAF. In sinus rhythm on admission, but 1 run of AF RVR overnight. He spontaneously converted.  - Continue home amiodarone 400mg qd  - Stop home Eliquis and continue heparin gtt while in the hospital.       IVÁN (acute kidney injury)  IVÁN on CKD2. Baseline Cr of 1.7-2.0.   Current sCr uptrending (3.1 this AM)  - Trend with diuresis  - Hold ARNi    Type 2 diabetes mellitus without complication, without long-term current use of insulin  A1c 7.6, not insulin dependent  - MDSSI  - Consider endocrine if uncontrolled    CAD (coronary artery  disease)  S/p 3vCABG in 2009  - continue home ASA, HI statin    Ventricular tachycardia  Hx VT s/p ICD placement (medtronic 2009)  - Continue home amio 400mg qd      Uninterrupted Critical Care/Counseling Time (not including procedures): 60 min.       Jimy An PA-C  Heart Transplant  [facility] - Cardiac Intensive Care

## 2023-11-14 NOTE — PROCEDURES
Radha Abbott is a 63 y.o. male patient.    Temp: 97.4 °F (36.3 °C) (11/14/23 1505)  Pulse: 77 (11/14/23 1705)  Resp: (!) 24 (11/14/23 1705)  BP: 102/71 (11/14/23 1705)  SpO2: 100 % (11/14/23 1705)  Weight: 77.5 kg (170 lb 13.7 oz) (11/14/23 0300)  Height: 6' (182.9 cm) (11/13/23 1654)    Central Line    Date/Time: 11/14/2023 5:25 PM    Performed by: Gillies, Connor M, MD  Authorized by: Gillies, Connor M, MD    Location procedure was performed:  [facility] CARDIAC ICU  Pre-operative diagnosis:  ADHF  Consent Done ?:  Yes  Time out complete?: Verified correct patient, procedure, equipment, staff, and site/side    Indications:  Hemodynamic monitoring  Anesthesia:  Local infiltration  Local anesthetic:  Lidocaine 1% without epinephrine  Preparation:  Skin prepped with ChloraPrep  Skin prep agent dried: Skin prep agent completely dried prior to procedure    Sterile barriers: All five maximal sterile barriers used - gloves, gown, cap, mask and large sterile sheet    Hand hygiene: Hand hygiene performed immediately prior to central venous catheter insertion    Location:  Right internal jugular  Catheter type:  Triple lumen  Catheter size:  7 Fr  Ultrasound guidance: Yes    Vessel Caliber:  Medium and large   patent  Comprressibility:  Normal  Needle advanced into vessel with real time ultrasound guidance.    Guidewire confirmed in vessel.    Image recorded and saved.    Steril sheath on probe.    Sterile gel used.  Manometry: Yes    Number of attempts:  1  Securement:  Line sutured, chlorhexidine patch, sterile dressing applied and blood return through all ports  Complications: No    Specimens: No    Implants: No    Adverse Events:  None           No data to display                  11/14/2023

## 2023-11-14 NOTE — EICU
Intervention Initiated From:  Bedside    Theo intervened regarding:  Time-Out    Nurse Notified:  Yes    Doctor Notified:  Yes    Comments: EICU rounding done. Chart and meds reviewed.  VS stable.

## 2023-11-15 ENCOUNTER — TELEPHONE (OUTPATIENT)
Dept: TRANSPLANT | Facility: CLINIC | Age: 63
End: 2023-11-15
Payer: MEDICAID

## 2023-11-15 LAB
ABORH REPEAT: NORMAL
ALLENS TEST: ABNORMAL
ALLENS TEST: NORMAL
ANION GAP SERPL CALC-SCNC: 14 MMOL/L (ref 8–16)
ANION GAP SERPL CALC-SCNC: 14 MMOL/L (ref 8–16)
APTT PPP: 49.4 SEC (ref 21–32)
BACTERIA #/AREA URNS AUTO: ABNORMAL /HPF
BASOPHILS # BLD AUTO: 0.02 K/UL (ref 0–0.2)
BASOPHILS NFR BLD: 0.3 % (ref 0–1.9)
BILIRUB DIRECT SERPL-MCNC: 0.4 MG/DL (ref 0.1–0.3)
BILIRUB UR QL STRIP: NEGATIVE
BUN SERPL-MCNC: 60 MG/DL (ref 8–23)
BUN SERPL-MCNC: 64 MG/DL (ref 8–23)
CALCIUM SERPL-MCNC: 9.2 MG/DL (ref 8.7–10.5)
CALCIUM SERPL-MCNC: 9.4 MG/DL (ref 8.7–10.5)
CHLORIDE SERPL-SCNC: 97 MMOL/L (ref 95–110)
CHLORIDE SERPL-SCNC: 97 MMOL/L (ref 95–110)
CHOLEST SERPL-MCNC: 107 MG/DL (ref 120–199)
CHOLEST/HDLC SERPL: 2.2 {RATIO} (ref 2–5)
CLARITY UR REFRACT.AUTO: CLEAR
CO2 SERPL-SCNC: 22 MMOL/L (ref 23–29)
CO2 SERPL-SCNC: 23 MMOL/L (ref 23–29)
COLOR UR AUTO: YELLOW
COMPLEXED PSA SERPL-MCNC: 1.5 NG/ML (ref 0–4)
CREAT SERPL-MCNC: 3.2 MG/DL (ref 0.5–1.4)
CREAT SERPL-MCNC: 3.4 MG/DL (ref 0.5–1.4)
CRP SERPL-MCNC: 69.3 MG/L (ref 0–8.2)
DELSYS: ABNORMAL
DIFFERENTIAL METHOD BLD: ABNORMAL
EOSINOPHIL # BLD AUTO: 0 K/UL (ref 0–0.5)
EOSINOPHIL NFR BLD: 0.1 % (ref 0–8)
ERYTHROCYTE [DISTWIDTH] IN BLOOD BY AUTOMATED COUNT: 14.5 % (ref 11.5–14.5)
EST. GFR  (NO RACE VARIABLE): 19.7 ML/MIN/1.73 M^2
EST. GFR  (NO RACE VARIABLE): 21.2 ML/MIN/1.73 M^2
FERRITIN SERPL-MCNC: 248 NG/ML (ref 20–300)
FIBRINOGEN PPP-MCNC: 516 MG/DL (ref 182–400)
GLUCOSE SERPL-MCNC: 152 MG/DL (ref 70–110)
GLUCOSE SERPL-MCNC: 223 MG/DL (ref 70–110)
GLUCOSE UR QL STRIP: NEGATIVE
HAV IGG SER QL IA: NORMAL
HBV CORE AB SERPL QL IA: NORMAL
HBV SURFACE AB SER-ACNC: 72.13 MIU/ML
HBV SURFACE AB SER-ACNC: REACTIVE M[IU]/ML
HBV SURFACE AG SERPL QL IA: NORMAL
HCO3 UR-SCNC: 23 MMOL/L (ref 24–28)
HCO3 UR-SCNC: 23.3 MMOL/L (ref 24–28)
HCT VFR BLD AUTO: 24.9 % (ref 40–54)
HCV AB SERPL QL IA: NORMAL
HCYS SERPL-SCNC: 14.8 UMOL/L (ref 4–16.5)
HDLC SERPL-MCNC: 49 MG/DL (ref 40–75)
HDLC SERPL: 45.8 % (ref 20–50)
HGB BLD-MCNC: 8 G/DL (ref 14–18)
HGB UR QL STRIP: ABNORMAL
HIV 1+2 AB+HIV1 P24 AG SERPL QL IA: NORMAL
HYALINE CASTS UR QL AUTO: 0 /LPF
IMM GRANULOCYTES # BLD AUTO: 0.03 K/UL (ref 0–0.04)
IMM GRANULOCYTES NFR BLD AUTO: 0.4 % (ref 0–0.5)
INR PPP: 1.1 (ref 0.8–1.2)
IRON SERPL-MCNC: 16 UG/DL (ref 45–160)
KETONES UR QL STRIP: NEGATIVE
LDH SERPL L TO P-CCNC: 0.7 MMOL/L (ref 0.5–2.2)
LDH SERPL L TO P-CCNC: 252 U/L (ref 110–260)
LDLC SERPL CALC-MCNC: 43.2 MG/DL (ref 63–159)
LEFT AXILLARY ARTERY: 0.72 CM/S
LEFT DIST SUBCLAVIAN ARTERY: 0.74 CM
LEFT MID SUBCLAVIAN ARTERY: 0.71 CM/S
LEFT PROX SUBCLAVIAN ARTERY: 0.7 CM/S
LEUKOCYTE ESTERASE UR QL STRIP: ABNORMAL
LYMPHOCYTES # BLD AUTO: 0.7 K/UL (ref 1–4.8)
LYMPHOCYTES NFR BLD: 9.8 % (ref 18–48)
MAGNESIUM SERPL-MCNC: 2.5 MG/DL (ref 1.6–2.6)
MCH RBC QN AUTO: 28.9 PG (ref 27–31)
MCHC RBC AUTO-ENTMCNC: 32.1 G/DL (ref 32–36)
MCV RBC AUTO: 90 FL (ref 82–98)
MICROSCOPIC COMMENT: ABNORMAL
MONOCYTES # BLD AUTO: 0.4 K/UL (ref 0.3–1)
MONOCYTES NFR BLD: 5.6 % (ref 4–15)
NEUTROPHILS # BLD AUTO: 5.9 K/UL (ref 1.8–7.7)
NEUTROPHILS NFR BLD: 83.8 % (ref 38–73)
NITRITE UR QL STRIP: NEGATIVE
NONHDLC SERPL-MCNC: 58 MG/DL
NRBC BLD-RTO: 0 /100 WBC
PCO2 BLDA: 33 MMHG (ref 35–45)
PCO2 BLDA: 33.4 MMHG (ref 35–45)
PH SMN: 7.45 [PH] (ref 7.35–7.45)
PH SMN: 7.45 [PH] (ref 7.35–7.45)
PH UR STRIP: 7 [PH] (ref 5–8)
PHOSPHATE SERPL-MCNC: 5.5 MG/DL (ref 2.7–4.5)
PLATELET # BLD AUTO: 256 K/UL (ref 150–450)
PMV BLD AUTO: 10.8 FL (ref 9.2–12.9)
PO2 BLDA: 25 MMHG (ref 40–60)
PO2 BLDA: 26 MMHG (ref 40–60)
PO2 BLDA: 26 MMHG (ref 40–60)
POC BE: -1 MMOL/L
POC BE: -1 MMOL/L
POC SATURATED O2: 48 % (ref 95–100)
POC SATURATED O2: 51 % (ref 95–100)
POC SATURATED O2: 53 % (ref 95–100)
POC TCO2: 24 MMOL/L (ref 24–29)
POC TCO2: 24 MMOL/L (ref 24–29)
POCT GLUCOSE: 147 MG/DL (ref 70–110)
POCT GLUCOSE: 214 MG/DL (ref 70–110)
POCT GLUCOSE: 216 MG/DL (ref 70–110)
POTASSIUM SERPL-SCNC: 3.8 MMOL/L (ref 3.5–5.1)
POTASSIUM SERPL-SCNC: 4.2 MMOL/L (ref 3.5–5.1)
PREALB SERPL-MCNC: 27 MG/DL (ref 20–43)
PROT UR QL STRIP: ABNORMAL
PROTHROMBIN TIME: 11.7 SEC (ref 9–12.5)
RBC # BLD AUTO: 2.77 M/UL (ref 4.6–6.2)
RBC #/AREA URNS AUTO: 39 /HPF (ref 0–4)
RIGHT AXILLARY ARTERY MAPPING: 0.68 CM
RIGHT DIST SUBCLAVIAN ARTERY: 0.7 CM
RIGHT MID SUBCLAVIAN ARTERY: 0.77 CM
RIGHT PROX SUBCLAVIAN ARTERY: 0.83 CM
SAMPLE: ABNORMAL
SAMPLE: NORMAL
SITE: ABNORMAL
SITE: NORMAL
SODIUM SERPL-SCNC: 133 MMOL/L (ref 136–145)
SODIUM SERPL-SCNC: 134 MMOL/L (ref 136–145)
SP GR UR STRIP: 1.01 (ref 1–1.03)
SQUAMOUS #/AREA URNS AUTO: 0 /HPF
TRANSFERRIN SERPL-MCNC: 235 MG/DL (ref 200–375)
TRIGL SERPL-MCNC: 74 MG/DL (ref 30–150)
TSH SERPL DL<=0.005 MIU/L-ACNC: 1.38 UIU/ML (ref 0.4–4)
UPPER ARTERIAL LEFT ARM AXILLARY SYS MAX: 168 CM/S
UPPER ARTERIAL LEFT ARM SUBCLAVIAN SYS MAX: 139 CM/S
UPPER ARTERIAL RIGHT ARM AXILLARY SYS MAX: 86 CM/S
UPPER ARTERIAL RIGHT ARM SUBCLAVIAN SYS MAX: 108 CM/S
URATE SERPL-MCNC: 13.6 MG/DL (ref 3.4–7)
URN SPEC COLLECT METH UR: ABNORMAL
WBC # BLD AUTO: 7.02 K/UL (ref 3.9–12.7)
WBC #/AREA URNS AUTO: 9 /HPF (ref 0–5)

## 2023-11-15 PROCEDURE — 86480 TB TEST CELL IMMUN MEASURE: CPT | Mod: NTX | Performed by: PHYSICIAN ASSISTANT

## 2023-11-15 PROCEDURE — 85670 THROMBIN TIME PLASMA: CPT | Mod: NTX | Performed by: PHYSICIAN ASSISTANT

## 2023-11-15 PROCEDURE — 86777 TOXOPLASMA ANTIBODY: CPT | Mod: NTX | Performed by: PHYSICIAN ASSISTANT

## 2023-11-15 PROCEDURE — 99900035 HC TECH TIME PER 15 MIN (STAT): Mod: NTX

## 2023-11-15 PROCEDURE — 94761 N-INVAS EAR/PLS OXIMETRY MLT: CPT | Mod: NTX,XB

## 2023-11-15 PROCEDURE — 83735 ASSAY OF MAGNESIUM: CPT | Mod: NTX | Performed by: STUDENT IN AN ORGANIZED HEALTH CARE EDUCATION/TRAINING PROGRAM

## 2023-11-15 PROCEDURE — 99292 CRITICAL CARE ADDL 30 MIN: CPT | Mod: NTX,,, | Performed by: INTERNAL MEDICINE

## 2023-11-15 PROCEDURE — 80323 ALKALOIDS NOS: CPT | Mod: NTX | Performed by: PHYSICIAN ASSISTANT

## 2023-11-15 PROCEDURE — 25000003 PHARM REV CODE 250: Mod: NTX | Performed by: STUDENT IN AN ORGANIZED HEALTH CARE EDUCATION/TRAINING PROGRAM

## 2023-11-15 PROCEDURE — 20000000 HC ICU ROOM: Mod: NTX

## 2023-11-15 PROCEDURE — 82728 ASSAY OF FERRITIN: CPT | Mod: NTX | Performed by: PHYSICIAN ASSISTANT

## 2023-11-15 PROCEDURE — 99232 SBSQ HOSP IP/OBS MODERATE 35: CPT | Mod: NTX,,, | Performed by: PHYSICIAN ASSISTANT

## 2023-11-15 PROCEDURE — 83540 ASSAY OF IRON: CPT | Mod: NTX | Performed by: PHYSICIAN ASSISTANT

## 2023-11-15 PROCEDURE — 82803 BLOOD GASES ANY COMBINATION: CPT | Mod: NTX

## 2023-11-15 PROCEDURE — 81240 F2 GENE: CPT | Mod: NTX | Performed by: PHYSICIAN ASSISTANT

## 2023-11-15 PROCEDURE — 94799 UNLISTED PULMONARY SVC/PX: CPT | Mod: NTX

## 2023-11-15 PROCEDURE — 99291 CRITICAL CARE FIRST HOUR: CPT | Mod: FS,NTX,, | Performed by: PHYSICIAN ASSISTANT

## 2023-11-15 PROCEDURE — 80307 DRUG TEST PRSMV CHEM ANLYZR: CPT | Mod: NTX | Performed by: PHYSICIAN ASSISTANT

## 2023-11-15 PROCEDURE — 84153 ASSAY OF PSA TOTAL: CPT | Mod: NTX | Performed by: PHYSICIAN ASSISTANT

## 2023-11-15 PROCEDURE — 87340 HEPATITIS B SURFACE AG IA: CPT | Mod: NTX | Performed by: PHYSICIAN ASSISTANT

## 2023-11-15 PROCEDURE — 97168 OT RE-EVAL EST PLAN CARE: CPT | Mod: NTX

## 2023-11-15 PROCEDURE — 82248 BILIRUBIN DIRECT: CPT | Mod: NTX | Performed by: PHYSICIAN ASSISTANT

## 2023-11-15 PROCEDURE — 97110 THERAPEUTIC EXERCISES: CPT | Mod: NTX

## 2023-11-15 PROCEDURE — 86147 CARDIOLIPIN ANTIBODY EA IG: CPT | Mod: 59,NTX | Performed by: PHYSICIAN ASSISTANT

## 2023-11-15 PROCEDURE — 86592 SYPHILIS TEST NON-TREP QUAL: CPT | Mod: NTX | Performed by: PHYSICIAN ASSISTANT

## 2023-11-15 PROCEDURE — 84466 ASSAY OF TRANSFERRIN: CPT | Mod: NTX | Performed by: PHYSICIAN ASSISTANT

## 2023-11-15 PROCEDURE — 81241 F5 GENE: CPT | Mod: NTX | Performed by: PHYSICIAN ASSISTANT

## 2023-11-15 PROCEDURE — 86803 HEPATITIS C AB TEST: CPT | Mod: NTX | Performed by: PHYSICIAN ASSISTANT

## 2023-11-15 PROCEDURE — 86644 CMV ANTIBODY: CPT | Mod: NTX | Performed by: PHYSICIAN ASSISTANT

## 2023-11-15 PROCEDURE — 85397 CLOTTING FUNCT ACTIVITY: CPT | Mod: NTX | Performed by: PHYSICIAN ASSISTANT

## 2023-11-15 PROCEDURE — 81001 URINALYSIS AUTO W/SCOPE: CPT | Mod: NTX | Performed by: PHYSICIAN ASSISTANT

## 2023-11-15 PROCEDURE — 85302 CLOT INHIBIT PROT C ANTIGEN: CPT | Mod: NTX | Performed by: PHYSICIAN ASSISTANT

## 2023-11-15 PROCEDURE — 85246 CLOT FACTOR VIII VW ANTIGEN: CPT | Mod: NTX | Performed by: PHYSICIAN ASSISTANT

## 2023-11-15 PROCEDURE — 86790 VIRUS ANTIBODY NOS: CPT | Mod: NTX | Performed by: PHYSICIAN ASSISTANT

## 2023-11-15 PROCEDURE — 86901 BLOOD TYPING SEROLOGIC RH(D): CPT | Mod: NTX | Performed by: PHYSICIAN ASSISTANT

## 2023-11-15 PROCEDURE — 63600175 PHARM REV CODE 636 W HCPCS: Mod: NTX | Performed by: INTERNAL MEDICINE

## 2023-11-15 PROCEDURE — 86704 HEP B CORE ANTIBODY TOTAL: CPT | Mod: NTX | Performed by: PHYSICIAN ASSISTANT

## 2023-11-15 PROCEDURE — 36415 COLL VENOUS BLD VENIPUNCTURE: CPT | Mod: NTX | Performed by: PHYSICIAN ASSISTANT

## 2023-11-15 PROCEDURE — 85635 REPTILASE TEST: CPT | Mod: NTX | Performed by: PHYSICIAN ASSISTANT

## 2023-11-15 PROCEDURE — 83090 ASSAY OF HOMOCYSTEINE: CPT | Mod: NTX | Performed by: PHYSICIAN ASSISTANT

## 2023-11-15 PROCEDURE — 82542 COL CHROMOTOGRAPHY QUAL/QUAN: CPT | Mod: NTX | Performed by: PHYSICIAN ASSISTANT

## 2023-11-15 PROCEDURE — 25000003 PHARM REV CODE 250: Mod: NTX | Performed by: INTERNAL MEDICINE

## 2023-11-15 PROCEDURE — 85730 THROMBOPLASTIN TIME PARTIAL: CPT | Mod: NTX | Performed by: STUDENT IN AN ORGANIZED HEALTH CARE EDUCATION/TRAINING PROGRAM

## 2023-11-15 PROCEDURE — 25000003 PHARM REV CODE 250: Mod: NTX | Performed by: PHYSICIAN ASSISTANT

## 2023-11-15 PROCEDURE — 82955 ASSAY OF G6PD ENZYME: CPT | Mod: NTX | Performed by: PHYSICIAN ASSISTANT

## 2023-11-15 PROCEDURE — 63600175 PHARM REV CODE 636 W HCPCS: Mod: NTX | Performed by: PHYSICIAN ASSISTANT

## 2023-11-15 PROCEDURE — 85301 ANTITHROMBIN III ANTIGEN: CPT | Mod: NTX | Performed by: PHYSICIAN ASSISTANT

## 2023-11-15 PROCEDURE — 85305 CLOT INHIBIT PROT S TOTAL: CPT | Mod: NTX | Performed by: PHYSICIAN ASSISTANT

## 2023-11-15 PROCEDURE — 80048 BASIC METABOLIC PNL TOTAL CA: CPT | Mod: 91,NTX | Performed by: PHYSICIAN ASSISTANT

## 2023-11-15 PROCEDURE — 83615 LACTATE (LD) (LDH) ENZYME: CPT | Mod: NTX | Performed by: PHYSICIAN ASSISTANT

## 2023-11-15 PROCEDURE — 86787 VARICELLA-ZOSTER ANTIBODY: CPT | Mod: NTX | Performed by: PHYSICIAN ASSISTANT

## 2023-11-15 PROCEDURE — 97164 PT RE-EVAL EST PLAN CARE: CPT | Mod: NTX

## 2023-11-15 PROCEDURE — 83605 ASSAY OF LACTIC ACID: CPT | Mod: NTX

## 2023-11-15 PROCEDURE — 63600175 PHARM REV CODE 636 W HCPCS: Mod: NTX | Performed by: STUDENT IN AN ORGANIZED HEALTH CARE EDUCATION/TRAINING PROGRAM

## 2023-11-15 PROCEDURE — 85525 HEPARIN NEUTRALIZATION: CPT | Mod: NTX | Performed by: PHYSICIAN ASSISTANT

## 2023-11-15 PROCEDURE — 85732 THROMBOPLASTIN TIME PARTIAL: CPT | Mod: NTX | Performed by: PHYSICIAN ASSISTANT

## 2023-11-15 PROCEDURE — 85610 PROTHROMBIN TIME: CPT | Mod: 91,NTX | Performed by: PHYSICIAN ASSISTANT

## 2023-11-15 PROCEDURE — 85025 COMPLETE CBC W/AUTO DIFF WBC: CPT | Mod: NTX | Performed by: STUDENT IN AN ORGANIZED HEALTH CARE EDUCATION/TRAINING PROGRAM

## 2023-11-15 PROCEDURE — 84550 ASSAY OF BLOOD/URIC ACID: CPT | Mod: NTX | Performed by: PHYSICIAN ASSISTANT

## 2023-11-15 PROCEDURE — 84100 ASSAY OF PHOSPHORUS: CPT | Mod: NTX | Performed by: STUDENT IN AN ORGANIZED HEALTH CARE EDUCATION/TRAINING PROGRAM

## 2023-11-15 PROCEDURE — 86682 HELMINTH ANTIBODY: CPT | Mod: NTX | Performed by: PHYSICIAN ASSISTANT

## 2023-11-15 PROCEDURE — 85306 CLOT INHIBIT PROT S FREE: CPT | Mod: NTX | Performed by: PHYSICIAN ASSISTANT

## 2023-11-15 PROCEDURE — 86140 C-REACTIVE PROTEIN: CPT | Mod: NTX | Performed by: PHYSICIAN ASSISTANT

## 2023-11-15 PROCEDURE — 80061 LIPID PANEL: CPT | Mod: NTX | Performed by: PHYSICIAN ASSISTANT

## 2023-11-15 PROCEDURE — 84443 ASSAY THYROID STIM HORMONE: CPT | Mod: NTX | Performed by: PHYSICIAN ASSISTANT

## 2023-11-15 PROCEDURE — 86665 EPSTEIN-BARR CAPSID VCA: CPT | Mod: NTX | Performed by: PHYSICIAN ASSISTANT

## 2023-11-15 PROCEDURE — 80048 BASIC METABOLIC PNL TOTAL CA: CPT | Mod: NTX | Performed by: STUDENT IN AN ORGANIZED HEALTH CARE EDUCATION/TRAINING PROGRAM

## 2023-11-15 PROCEDURE — 86833 HLA CLASS II HIGH DEFIN QUAL: CPT | Mod: TXP | Performed by: PHYSICIAN ASSISTANT

## 2023-11-15 PROCEDURE — 87389 HIV-1 AG W/HIV-1&-2 AB AG IA: CPT | Mod: NTX | Performed by: PHYSICIAN ASSISTANT

## 2023-11-15 PROCEDURE — 86696 HERPES SIMPLEX TYPE 2 TEST: CPT | Mod: NTX | Performed by: PHYSICIAN ASSISTANT

## 2023-11-15 PROCEDURE — 85613 RUSSELL VIPER VENOM DILUTED: CPT | Mod: NTX | Performed by: PHYSICIAN ASSISTANT

## 2023-11-15 PROCEDURE — 84134 ASSAY OF PREALBUMIN: CPT | Mod: NTX | Performed by: PHYSICIAN ASSISTANT

## 2023-11-15 PROCEDURE — 85384 FIBRINOGEN ACTIVITY: CPT | Mod: NTX | Performed by: PHYSICIAN ASSISTANT

## 2023-11-15 PROCEDURE — 85247 CLOT FACTOR VIII MULTIMETRIC: CPT | Mod: NTX | Performed by: PHYSICIAN ASSISTANT

## 2023-11-15 PROCEDURE — 86706 HEP B SURFACE ANTIBODY: CPT | Mod: 91,NTX | Performed by: PHYSICIAN ASSISTANT

## 2023-11-15 RX ORDER — FUROSEMIDE 10 MG/ML
60 INJECTION INTRAMUSCULAR; INTRAVENOUS ONCE
Status: COMPLETED | OUTPATIENT
Start: 2023-11-15 | End: 2023-11-15

## 2023-11-15 RX ORDER — FUROSEMIDE 10 MG/ML
80 INJECTION INTRAMUSCULAR; INTRAVENOUS ONCE
Status: COMPLETED | OUTPATIENT
Start: 2023-11-15 | End: 2023-11-15

## 2023-11-15 RX ADMIN — AMIODARONE HYDROCHLORIDE 400 MG: 200 TABLET ORAL at 09:11

## 2023-11-15 RX ADMIN — FUROSEMIDE 80 MG: 10 INJECTION, SOLUTION INTRAVENOUS at 10:11

## 2023-11-15 RX ADMIN — THERA TABS 1 TABLET: TAB at 09:11

## 2023-11-15 RX ADMIN — INSULIN ASPART 7 UNITS: 100 INJECTION, SOLUTION INTRAVENOUS; SUBCUTANEOUS at 04:11

## 2023-11-15 RX ADMIN — SENNOSIDES AND DOCUSATE SODIUM 2 TABLET: 8.6; 5 TABLET ORAL at 09:11

## 2023-11-15 RX ADMIN — HYDRALAZINE HYDROCHLORIDE 25 MG: 25 TABLET, FILM COATED ORAL at 01:11

## 2023-11-15 RX ADMIN — INSULIN ASPART 2 UNITS: 100 INJECTION, SOLUTION INTRAVENOUS; SUBCUTANEOUS at 12:11

## 2023-11-15 RX ADMIN — GABAPENTIN 300 MG: 300 CAPSULE ORAL at 10:11

## 2023-11-15 RX ADMIN — PANTOPRAZOLE SODIUM 40 MG: 40 TABLET, DELAYED RELEASE ORAL at 06:11

## 2023-11-15 RX ADMIN — INSULIN ASPART 7 UNITS: 100 INJECTION, SOLUTION INTRAVENOUS; SUBCUTANEOUS at 07:11

## 2023-11-15 RX ADMIN — Medication 400 MG: at 09:11

## 2023-11-15 RX ADMIN — ACETAMINOPHEN 650 MG: 325 TABLET ORAL at 10:11

## 2023-11-15 RX ADMIN — FUROSEMIDE 60 MG: 10 INJECTION, SOLUTION INTRAMUSCULAR; INTRAVENOUS at 04:11

## 2023-11-15 RX ADMIN — INSULIN ASPART 2 UNITS: 100 INJECTION, SOLUTION INTRAVENOUS; SUBCUTANEOUS at 04:11

## 2023-11-15 RX ADMIN — ASPIRIN 81 MG: 81 TABLET, COATED ORAL at 09:11

## 2023-11-15 RX ADMIN — HYDRALAZINE HYDROCHLORIDE 25 MG: 25 TABLET, FILM COATED ORAL at 06:11

## 2023-11-15 RX ADMIN — INSULIN ASPART 7 UNITS: 100 INJECTION, SOLUTION INTRAVENOUS; SUBCUTANEOUS at 12:11

## 2023-11-15 RX ADMIN — FUROSEMIDE 10 MG/HR: 10 INJECTION, SOLUTION INTRAMUSCULAR; INTRAVENOUS at 11:11

## 2023-11-15 RX ADMIN — ATORVASTATIN CALCIUM 40 MG: 40 TABLET, FILM COATED ORAL at 09:11

## 2023-11-15 RX ADMIN — HEPARIN SODIUM AND DEXTROSE 16 UNITS/KG/HR: 10000; 5 INJECTION INTRAVENOUS at 10:11

## 2023-11-15 NOTE — ASSESSMENT & PLAN NOTE
Lab Results   Component Value Date    CREATININE 3.2 (H) 11/15/2023     Avoid insulin stacking  Titrate insulin slowly

## 2023-11-15 NOTE — PLAN OF CARE
Recommendations  1. Continue Diabetic/Cardiac diet-Fluid per MD   2. RD following     Goals: Meet % of EEN/EPN by RD f/u date  Nutrition Goal Status: progressing towards goal  Communication of RD Recs: POC

## 2023-11-15 NOTE — PLAN OF CARE
PT evaluation completed- see note for details. PT POC and goals established.    Problem: Physical Therapy  Goal: Physical Therapy Goal  Description: Goals to be met by: 12/15/23     Patient will increase functional independence with mobility by performin. Sit to stand transfer with [place]  2. Gait  x 400 feet with [place] using LRAD.   3. Lower extremity exercise program x30 reps per handout, with independence    Outcome: Ongoing, Progressing

## 2023-11-15 NOTE — PLAN OF CARE
HTS Care Update Note     CVP: 4  SVO2: 41  CI: 2.23  CO: 4.43  SVR: 1299    UO: 110 cc/h, net -260    Infusions:  5, lasix gtt 20, hep gtt     Mechanical support: n/a    Plan:  -CVP down to 4 (was 15 on RHC yesterday), will d/c lasix gtt and re do CVP in 4 hours  -K 5.6 and re checked and wnl now, likely from over replacement     Plan discussed with attending     Omid Manzanares MD  Cardiovascular Disease PGY-V  [facility]

## 2023-11-15 NOTE — SUBJECTIVE & OBJECTIVE
Interval History: No acute events overnight. Patient feels well aside from some abdominal discomfort. Kidney function improved some (3.6 -->3.2) w/ holding of lasix.    CVP 3  SvO2 53%  CO 5.38   CI 2.72  SVR 1160      Continuous Infusions:   sodium chloride 0.9%      DOBUTamine 5 mcg/kg/min (11/15/23 1105)    heparin (porcine) in D5W 16 Units/kg/hr (11/15/23 1105)     Scheduled Meds:   amiodarone  400 mg Oral Daily    aspirin  81 mg Oral Daily    atorvastatin  40 mg Oral Daily    hydrALAZINE  25 mg Oral Q8H    insulin aspart U-100  7 Units Subcutaneous TIDWM    magnesium oxide  400 mg Oral Daily    multivitamin  1 tablet Oral Daily    pantoprazole  40 mg Oral Before breakfast    senna-docusate 8.6-50 mg  2 tablet Oral BID     PRN Meds:sodium chloride 0.9%, acetaminophen, dextrose 10%, dextrose 10%, gabapentin, glucagon (human recombinant), glucose, glucose, heparin (PORCINE), heparin (PORCINE), insulin aspart U-100    Review of patient's allergies indicates:  No Known Allergies  Objective:     Vital Signs (Most Recent):  Temp: 98.4 °F (36.9 °C) (11/15/23 0705)  Pulse: 83 (11/15/23 1105)  Resp: (!) 24 (11/15/23 1105)  BP: 118/72 (11/15/23 1105)  SpO2: 100 % (11/15/23 1105) Vital Signs (24h Range):  Temp:  [97.4 °F (36.3 °C)-98.4 °F (36.9 °C)] 98.4 °F (36.9 °C)  Pulse:  [69-87] 83  Resp:  [15-28] 24  SpO2:  [95 %-100 %] 100 %  BP: ()/(53-78) 118/72     Patient Vitals for the past 72 hrs (Last 3 readings):   Weight   11/14/23 0300 77.5 kg (170 lb 13.7 oz)       Body mass index is 23.17 kg/m².      Intake/Output Summary (Last 24 hours) at 11/15/2023 1132  Last data filed at 11/15/2023 1105  Gross per 24 hour   Intake 1303.81 ml   Output 1510 ml   Net -206.19 ml         Hemodynamic Parameters:            Physical Exam  Constitutional:       Comments: Alert, Oriented, No acute distress   HENT:      Head: Normocephalic and atraumatic.   Eyes:      Conjunctiva/sclera: Conjunctivae normal.   Neck:      Vascular: No  hepatojugular reflux or JVD.      Comments: JVP does not appear elevated.   Cardiovascular:      Rate and Rhythm: Normal rate and regular rhythm.   Pulmonary:      Comments: Normal effort, Clear to auscultation   Abdominal:      Comments: Soft, Non-tender, Non-distended   Musculoskeletal:      Cervical back: Normal range of motion.      Right lower leg: No edema.      Left lower leg: No edema.      Comments: No peripheral edema   Skin:     Comments: Dry, Intact, Warm, Capillary refill <2 seconds.    Neurological:      Mental Status: He is alert and oriented to person, place, and time.      Comments: Normal speech   Psychiatric:         Mood and Affect: Mood and affect normal.            Significant Labs:  CBC:  Recent Labs   Lab 11/13/23  0504 11/14/23  0435 11/15/23  0401   WBC 4.24 4.20 7.02   RBC 2.59* 2.64* 2.77*   HGB 7.8* 7.8* 8.0*   HCT 23.5* 23.5* 24.9*    247 256   MCV 91 89 90   MCH 30.1 29.5 28.9   MCHC 33.2 33.2 32.1       BNP:  Recent Labs   Lab 11/09/23  0656   BNP 1,204*       CMP:  Recent Labs   Lab 11/09/23  0656 11/09/23  1607 11/10/23  1816 11/11/23  0508 11/14/23  0435 11/14/23  1836 11/14/23  2240 11/15/23  0401   *   < > 116*   < > 121* 187*  --  152*   CALCIUM 9.5   < > 9.2   < > 9.1 9.1  --  9.4   ALBUMIN 3.7  --  3.8  --   --   --   --   --    PROT 7.5  --  7.3  --   --   --   --   --    *   < > 138   < > 137 133*  --  134*   K 4.9   < > 4.8   < > 4.2 5.6* 4.3 3.8   CO2 21*   < > 21*   < > 23 22*  --  23      < > 103   < > 102 98  --  97   BUN 57*   < > 57*   < > 62* 62*  --  60*   CREATININE 2.6*   < > 2.5*   < > 3.1* 3.6*  --  3.2*   ALKPHOS 85  --  89  --   --   --   --   --    ALT 42  --  37  --   --   --   --   --    AST 26  --  19  --   --   --   --   --    BILITOT 0.8  --  1.0  --   --   --   --   --     < > = values in this interval not displayed.        Coagulation:   Recent Labs   Lab 11/11/23  0958 11/11/23  1600 11/14/23  0042 11/14/23  0435  "11/15/23  0401   INR 1.2  --   --   --   --    APTT 31.3   < > 58.1* 55.8* 49.4*    < > = values in this interval not displayed.       LDH:  No results for input(s): "LDH" in the last 72 hours.    Microbiology:  Microbiology Results (last 7 days)       ** No results found for the last 168 hours. **            I have reviewed all pertinent labs within the past 24 hours.    Estimated Creatinine Clearance: 26.6 mL/min (A) (based on SCr of 3.2 mg/dL (H)).    Diagnostic Results:  I have reviewed all pertinent imaging results/findings within the past 24 hours.  "

## 2023-11-15 NOTE — PLAN OF CARE
Problem: Occupational Therapy  Goal: Occupational Therapy Goal  Description: Goals to be met by: 12/15/23     Patient will increase functional independence with ADLs by performing:    LE Dressing with Supervision.  Grooming while standing at sink with Supervision.  Toileting from toilet with Supervision for hygiene and clothing management.   Supine to sit with Supervision.  Step transfer with Supervision  [place] with BUE HEP to improve activity tolerance to complete ADLs and IADLs  Within home ambulation distances with supervision and LRAD necessary to complete ADLs.        Outcome: Ongoing, Progressing

## 2023-11-15 NOTE — ASSESSMENT & PLAN NOTE
Pt with known ICM with an EF of 25% on home  presenting with decompensated HF.  Not in cardiogenic shock    RHC 11/14: RA 14, PA 70/35, PCWP 32, CO 4.1, CI 2.1.     - Home  gtt at 5  - home GDMT: entresto 24/26mg BID (on hold 2/2 IVÁN), Hydralazine 25 q8h being continued  - home diuretics: Was taking lasix 40 bid  - CVP 3 this AM Contine  5. Lasix on hold. Monitor CVP and sCr trend.  - CTS to follow regarding potential need for upgraded support to temporary MCS as bridge to advanced therapies.l   - Continue VAD/OHTx workup. On step 2.

## 2023-11-15 NOTE — PT/OT/SLP RE-EVAL
"Occupational Therapy   Re-evaluation & Treatment    Name: Radha Abbott  MRN: [id number]  Admitting Diagnosis:  Acute on chronic combined systolic and diastolic CHF, NYHA class 4  Recent Surgery: Procedure(s) (LRB):  INSERTION, CATHETER, RIGHT HEART (Right) 2 Days Post-Op    Recommendations:     Discharge Recommendations: No Therapy Indicated  Discharge Equipment Recommendations: none  Barriers to discharge:  None    Assessment:     Radha Abbott is a 63 y.o. male with a medical diagnosis of Acute on chronic combined systolic and diastolic CHF, NYHA class 4.  He presents with re-evaluation due to increased unsteadiness and concern with functional mobility and independence with  ADLs. Focus of session on distributing HEP to encourage active lifestyle while hospitalized. Patient demonstrated compliance with each exercise x5 reps and patient/wife reported understanding of  importance of compliance with HEP and continuing OOB activity.  Performance deficits affecting function are weakness, impaired endurance, impaired functional mobility, impaired self care skills, impaired balance, impaired cardiopulmonary response to activity.      Rehab Prognosis:  Good; patient would benefit from acute skilled OT services to address these deficits and reach maximum level of function.       Plan:     Patient to be seen 2 x/week to address the above listed problems via self-care/home management, therapeutic activities, therapeutic exercises, neuromuscular re-education  Plan of Care Expires: 12/15/23  Plan of Care Reviewed with: spouse, patient    Subjective     Chief Complaint: increased weakness  Patient/Family stated goals/comments:"I just feel weaker"  Communicated with: nursing prior to session.  Pain/Comfort:  Pain Rating 1: 0/10  Pain Rating Post-Intervention 1: 0/10    Objective:     Communicated with: nursing prior to session.  Patient found up in chair with: blood pressure cuff, telemetry, peripheral IV, goodman catheter, pulse ox " (continuous) upon OT entry to room. Patient's wife present in room during session.     General Precautions: Standard, fall  Orthopedic Precautions: N/A  Braces: N/A  Respiratory Status: Room air    Occupational Performance:    Functional Mobility/Transfers:  Patient completed Sit <> Stand Transfer with contact guard assistance  with  hand-held assist x2 reps    Cognitive/Visual Perceptual:  Cognitive/Psychosocial Skills:     -       Oriented to: Person, Place, Time, and Situation   -       Follows Commands/attention:Follows multistep  commands  -       Communication: clear/fluent  -       Memory: No Deficits noted  -       Safety awareness/insight to disability: intact   -       Mood/Affect/Coping skills/emotional control: Appropriate to situation  -     Physical Exam:not formally assessed secondary to UE HEP      AMPA 6 Click:  AMPAC Total Score: 20    Treatment & Education:    Patient given written HEP and green theraband. Patient demonstrated x5 reps of the following exercises:   Shoulder flexion  Shoulder abduction  Shoulder horizontal AB  Bicep curls  Tricep extension  Shoulder external rotation    Patient left up in chair with all lines intact, call button in reach, nursing notified, and wife present    GOALS:   Multidisciplinary Problems       Occupational Therapy Goals       Not on file                    History:     Past Medical History:   Diagnosis Date    CAD (coronary artery disease)     Diabetes mellitus     HFrEF (heart failure with reduced ejection fraction)     ICD (implantable cardioverter-defibrillator) in place     MI, old          Past Surgical History:   Procedure Laterality Date    CARDIAC SURGERY      RIGHT HEART CATHETERIZATION Right 10/10/2023    Procedure: INSERTION, CATHETER, RIGHT HEART;  Surgeon: Bin Gandhi MD;  Location: [facility] CATH LAB;  Service: Cardiology;  Laterality: Right;    RIGHT HEART CATHETERIZATION Right 10/13/2023    Procedure: INSERTION, CATHETER, RIGHT HEART;   Surgeon: Walter Mcintyre MD;  Location: [facility] CATH LAB;  Service: Cardiology;  Laterality: Right;    RIGHT HEART CATHETERIZATION  11/13/2023    RIGHT HEART CATHETERIZATION Right 11/13/2023    Procedure: INSERTION, CATHETER, RIGHT HEART;  Surgeon: Juventino Bermudez Jr., MD;  Location: [facility] CATH LAB;  Service: Cardiology;  Laterality: Right;       Time Tracking:     OT Date of Treatment: 11/15/23  OT Start Time: 0902  OT Stop Time: 0927  OT Total Time (min): 25 min    Billable Minutes:Re-eval 10  Therapeutic Exercise 15    11/15/2023

## 2023-11-15 NOTE — PROGRESS NOTES
Update:  SW attempted to meet with pt, but he was having a test done.  SW will follow up at a later time.

## 2023-11-15 NOTE — PT/OT/SLP RE-EVAL
Physical Therapy Re-evaluation    Patient Name:  Radha Abbott   MRN:  [id number]    Recommendations:     Discharge Recommendations: No Therapy Indicated  Discharge Equipment Recommendations: none   Barriers to discharge: None    Assessment:     Radha Abbott is a 63 y.o. male admitted with a medical diagnosis of Acute on chronic combined systolic and diastolic CHF, NYHA class 4.  He presents with the following impairments/functional limitations: impaired functional mobility, gait instability, impaired balance, impaired cardiopulmonary response to activity. Pt agreeable to reevaluation. Pt presents with good BLE strength but reports impaired balance and increased fatigue with mobility compared to baseline. Pt with one minor self corrected LOB during gait trial today. Patient with decreased endurance and impaired balance. Pt is not functioning at his baseline and is currently a fall risk. Patient would benefit from skilled therapy services to maximize safety and independence, increase activity tolerance, decrease fall risk, decrease caregiver burden, improve QOL, improve patient's functional mobility, and decrease deconditioning.     Rehab Prognosis:  Good; patient would benefit from acute skilled PT services to address these deficits and reach maximum level of function.      Recent Surgery: Procedure(s) (LRB):  INSERTION, CATHETER, RIGHT HEART (Right) 2 Days Post-Op    Plan:     During this hospitalization, patient to be seen 2 x/week to address the above listed problems via gait training, therapeutic activities, therapeutic exercises, neuromuscular re-education  Plan of Care Expires:  12/15/23  Plan of Care Reviewed with: patient    Subjective     Communicated with RN prior to session.  Patient found up in chair with blood pressure cuff, telemetry, pulse ox (continuous), peripheral IV, goodman catheter upon PT entry to room, agreeable to evaluation.      Chief Complaint: None stated  Patient comments/goals: go  home  Pain/Comfort:  Pain Rating 1: 0/10  Pain Rating Post-Intervention 1: 0/10    Patients cultural, spiritual, Lutheran conflicts given the current situation: no      Objective:     Patient found with: blood pressure cuff, telemetry, pulse ox (continuous), peripheral IV, goodman catheter     General Precautions: Standard, fall  Orthopedic Precautions: N/A  Braces: N/A  Respiratory Status: Room air    Exams:  Cognitive Exam:  Patient is oriented to Person, Place, Time, and Situation  Gross Motor Coordination:  WFL  Postural Exam:  Patient presented with the following abnormalities:    -       Rounded shoulders  Sensation:    -       Intact  Skin Integrity/Edema:      -       Skin integrity: Visible skin intact  -       Edema: None noted    RLE ROM: WFL  RLE Strength: WFL  LLE ROM: WFL  LLE Strength: WFL    Functional Mobility:  Bed Mobility: Not performed 2/2 pt found/returned to bedside chair  Transfers:   Sit to Stand:  supervision with no AD without use of UE support  Gait: 40' in room with SBA (pt denied ambulating in hallway and denied further mobility in room requesting to return to chair)  Pt with 1 minor self corrected LOB requiring CGA for safety  Pt with slow gait speed, decreased step length, decreased stride length, decreased gina, decreased reciprocal arm swing  Balance:   Static standing: SBA  Dynamic standing: SBA    AM-PAC 6 CLICK MOBILITY  Total Score:20     Treatment and Education:  Pt completed 1x10 reps bilateral LE therex including seated LAQ, marches, hamstring curls, hip adduction, hip abduction with green theraband   Pt completed 1x2 reps bilateral LE therex including hip abduction, ankle pumps to ensure patient able to complete for HEP    Pt educated on PT role and POC and current functional status  Pt educated on LE therex to complete while sitting in chair   Pt educated on energy conservation techniques  Pt educated on completing sit to stand tranfers without use of UE in preparation  for potential sternal precautions  Pt educated to ambulate/mobilize in room with assistance from staff 2/2 functional status and medican lines; pt verbalized understanding  All questions answered within PT scope of practice    Patient left up in chair with all lines intact, call button in reach, and RN notified.    GOALS:   Multidisciplinary Problems       Physical Therapy Goals          Problem: Physical Therapy    Goal Priority Disciplines Outcome Goal Variances Interventions   Physical Therapy Goal     PT, PT/OT Ongoing, Progressing     Description: Goals to be met by: 12/15/23     Patient will increase functional independence with mobility by performin. Sit to stand transfer with [place]  2. Gait  x 400 feet with [place] using LRAD.   3. Lower extremity exercise program x30 reps per handout, with independence                   Multidisciplinary Problems (Resolved)          Problem: Physical Therapy    Goal Priority Disciplines Outcome Goal Variances Interventions   Physical Therapy Goal   (Resolved)     PT, PT/OT Met     Description: Goals to be met by: 23     Patient will increase functional independence with mobility by performin. Evaluate pt's need for physical therapy.                          History:     Past Medical History:   Diagnosis Date    CAD (coronary artery disease)     Diabetes mellitus     HFrEF (heart failure with reduced ejection fraction)     ICD (implantable cardioverter-defibrillator) in place     MI, old        Past Surgical History:   Procedure Laterality Date    CARDIAC SURGERY      RIGHT HEART CATHETERIZATION Right 10/10/2023    Procedure: INSERTION, CATHETER, RIGHT HEART;  Surgeon: Bin Gandhi MD;  Location: [facility] CATH LAB;  Service: Cardiology;  Laterality: Right;    RIGHT HEART CATHETERIZATION Right 10/13/2023    Procedure: INSERTION, CATHETER, RIGHT HEART;  Surgeon: Walter Mcintyre MD;  Location: [facility] CATH LAB;  Service: Cardiology;  Laterality:  Right;    RIGHT HEART CATHETERIZATION  11/13/2023    RIGHT HEART CATHETERIZATION Right 11/13/2023    Procedure: INSERTION, CATHETER, RIGHT HEART;  Surgeon: Juventino Bermudez Jr., MD;  Location: [facility] CATH LAB;  Service: Cardiology;  Laterality: Right;       Time Tracking:     PT Received On: 11/15/23  PT Start Time: 1310     PT Stop Time: 1328  PT Total Time (min): 18 min     Billable Minutes: Re-eval 10 minutes and Therapeutic Exercise 8 minutes      11/15/2023

## 2023-11-15 NOTE — PROGRESS NOTES
Update:  SW met with pt and his spouse to discuss psychosocial as part of pathway.  Arranged to complete psychosocial assessment tomorrow at 2pm, and pt advised he can have his sister (secondary caregiver) on facetime.  SW will follow.

## 2023-11-15 NOTE — PROGRESS NOTES
[facility] - Cardiac Intensive Care  Endocrinology  Progress Note    Admit Date: 2023     Reason for Consult: Management of T2DM, Hyperglycemia     Surgical Procedure and Date: n/a    Diabetes diagnosis year:     Home Diabetes Medications:  Metformin (off since October)   Lab Results   Component Value Date    HGBA1C 7.6 (H) 10/12/2023       How often checking glucose at home?  Once daily in the AM    BG readings on regimen: 150-160s  Hypoglycemia on the regimen?  No  Missed doses on regimen?  n/a    Diabetes Complications include:     Hyperglycemia    Complicating diabetes co morbidities:   CAD s/p CABG, HTN, HLD      HPI:   Patient is a 63 y.o. male with a diagnosis of CAD s/p 3v CABG (unclear anatomy, ), ICM with a recent EF of 15-20% s/p ICD ([employer]tronik ), DM2 (a1c 7.7), HTN, HLD, Vfib on amio who presents to the ED with CC of SOB. In the ED he was AF with stable VS on RA.  CBC showing chronic anemia.  CMP notable for acute on chronic CKD with baseline around 2.1 and Cr now 2.6.  BNP elevated.  Lactate neg.  CXR showing pulmonary edema. He was subsequently admitted to the CCU for diuresis.  Endocrinology consulted for management of T2DM.          Interval HPI:   No acute events overnight. Patient in room [id number] A. Blood glucose stable. BG at and above goal on current insulin regimen (SSI and prandial insulin). Steroid use- None .     Renal function- Abnormal -   Vasopressors-  None      Diet diabetic Cardiac (Low Na/Chol);  Calorie         Eatin%  Nausea: No  Hypoglycemia and intervention: No  Fever: No  TPN and/or TF: No    /66 (BP Location: Left forearm, Patient Position: Sitting)   Pulse 83   Temp 98.4 °F (36.9 °C) (Oral)   Resp (!) 25   Ht 6' (1.829 m)   Wt 77.5 kg (170 lb 13.7 oz)   SpO2 100%   BMI 23.17 kg/m²     Labs Reviewed and Include    Recent Labs   Lab 11/15/23  0401   *   CALCIUM 9.4   *   K 3.8   CO2 23   CL 97   BUN 60*   CREATININE  "3.2*     Lab Results   Component Value Date    WBC 7.02 11/15/2023    HGB 8.0 (L) 11/15/2023    HCT 24.9 (L) 11/15/2023    MCV 90 11/15/2023     11/15/2023     No results for input(s): "TSH", "FREET4" in the last 168 hours.  Lab Results   Component Value Date    HGBA1C 7.6 (H) 10/12/2023       Nutritional status:   Body mass index is 23.17 kg/m².  Lab Results   Component Value Date    ALBUMIN 3.8 11/10/2023    ALBUMIN 3.7 11/09/2023    ALBUMIN 3.1 (L) 11/01/2023     No results found for: "PREALBUMIN"    Estimated Creatinine Clearance: 26.6 mL/min (A) (based on SCr of 3.2 mg/dL (H)).    Accu-Checks  Recent Labs     11/12/23  1610 11/12/23  1928 11/13/23  0729 11/13/23  1241 11/13/23  1741 11/13/23  2206 11/14/23  0745 11/14/23  1201 11/14/23  1653 11/14/23 2123   POCTGLUCOSE 148* 230* 163* 220* 212* 181* 111* 205* 117* 253*       Current Medications and/or Treatments Impacting Glycemic Control  Immunotherapy:    Immunosuppressants       None          Steroids:   Hormones (From admission, onward)      None          Pressors:    Autonomic Drugs (From admission, onward)      None          Hyperglycemia/Diabetes Medications:   Antihyperglycemics (From admission, onward)      Start     Stop Route Frequency Ordered    11/13/23 1645  insulin aspart U-100 pen 7 Units         -- SubQ 3 times daily with meals 11/13/23 1444    11/10/23 1602  insulin aspart U-100 pen 0-5 Units         -- SubQ Before meals & nightly PRN 11/10/23 1503            ASSESSMENT and PLAN    Cardiac/Vascular  * Acute on chronic combined systolic and diastolic CHF, NYHA class 4  Managed per primary team  Optimize BG control      Renal/  IVÁN (acute kidney injury)  Lab Results   Component Value Date    CREATININE 3.2 (H) 11/15/2023     Avoid insulin stacking  Titrate insulin slowly       Endocrine  Type 2 diabetes mellitus without complication, without long-term current use of insulin  BG goal 140-180   T2DM.  BG at or above goal.  Will continue " to monitor on regimen.    Continue Novolog 7 units TID with meals   Low Dose Correction Scale (given kidney function)  BG monitoring ac/hs    ** Please call Endocrine for any BG related issues **    Discharge plans: TBD    Lab Results   Component Value Date    HGBA1C 7.6 (H) 10/12/2023               Albert Wilson PA-C  Endocrinology  [facility] - Cardiac Intensive Care

## 2023-11-15 NOTE — NURSING
1500 DENIZ Abdullahi at bedside to check CVP. CVP up to 7, previously 0-1 this am. Per PA, given rise in creatinine, limited UO, and increase in CVP, not emergent but pt likely going to need mechanical support in next one to two days. No new orders at this time.      1630 Per PA order, 60mg IVP lasix administered with subsequent improvement in UO. Also per , discussed with Albert and plan for IABP placement tomorrow.

## 2023-11-15 NOTE — TELEPHONE ENCOUNTER
EDUCATION NOTE:    Radha Abbott was seen today for pre-heart transplant education.  Patient signed wellness contract and informed consent to undergo heart transplant evaluation work-up.  Thorough pre-transplant education conducted.      Information presented included:  Evaluation process  Members of the transplant team  Selection committee members and role of the committee  Listing process for transplant  Different listing designations, including status 7  1-year graft survival statistics  LVAD as bridge to transplant or DT  Need to reach patient within 15 minutes of donor offer  PHS Donors with Risk Factors  Blood transfusions  Process for matching donor with recipient  Need for weight loss and how it relates to the wait time  Post-transplant immunosuppression for life with need to be able to afford post-transplant medications  Need for a caregiver to be with them at all times beginning with discharge from ICU, through at least the first 6 weeks post-transplant  Need to find local housing for the first 6 weeks post-transplant  How to reach team members at any time  OS website with written instructions regarding how to look up information specific to [facility] transplant program  Use of Hepatitis C organs    Patient was accompanied by wife.  Patient asked pertinent questions, which were answered to their satisfaction.  Patient was also given a copy of the wellness contract and informed consent to undergo evaluation work-up.

## 2023-11-15 NOTE — NURSING
1330 Jimy GUAMAN notified of pt with only 235 cc of urine this shift. Per Jimy, likely due to low CVP (3 this am ) so not of concern right now. Per PA BMP sent off to check Cr and CVP obtained. CVP now measuring 0. PA updated on CVP.

## 2023-11-15 NOTE — ASSESSMENT & PLAN NOTE
IVÁN on CKD2. Baseline Cr of 1.7-2.0.   Currently downtrending with holding of diuresis  - Trend along with CVP  - Hold ARNi, entresto

## 2023-11-15 NOTE — PROGRESS NOTES
[facility] - Cardiac Intensive Care  Heart Transplant  Progress Note    Patient Name: Radha Abbott  MRN: [id number]  Admission Date: 11/9/2023  Hospital Length of Stay: 6 days  Attending Physician: Sandhya Price MD  Primary Care Provider: Vasu Kong MD  Principal Problem:Acute on chronic combined systolic and diastolic CHF, NYHA class 4    Subjective:   Interval History: No acute events overnight. Patient feels well aside from some abdominal discomfort. Kidney function improved some (3.6 -->3.2) w/ holding of lasix.    CVP 3  SvO2 53%  CO 5.38   CI 2.72  SVR 1160      Continuous Infusions:   sodium chloride 0.9%      DOBUTamine 5 mcg/kg/min (11/15/23 1105)    heparin (porcine) in D5W 16 Units/kg/hr (11/15/23 1105)     Scheduled Meds:   amiodarone  400 mg Oral Daily    aspirin  81 mg Oral Daily    atorvastatin  40 mg Oral Daily    hydrALAZINE  25 mg Oral Q8H    insulin aspart U-100  7 Units Subcutaneous TIDWM    magnesium oxide  400 mg Oral Daily    multivitamin  1 tablet Oral Daily    pantoprazole  40 mg Oral Before breakfast    senna-docusate 8.6-50 mg  2 tablet Oral BID     PRN Meds:sodium chloride 0.9%, acetaminophen, dextrose 10%, dextrose 10%, gabapentin, glucagon (human recombinant), glucose, glucose, heparin (PORCINE), heparin (PORCINE), insulin aspart U-100    Review of patient's allergies indicates:  No Known Allergies  Objective:     Vital Signs (Most Recent):  Temp: 98.4 °F (36.9 °C) (11/15/23 0705)  Pulse: 83 (11/15/23 1105)  Resp: (!) 24 (11/15/23 1105)  BP: 118/72 (11/15/23 1105)  SpO2: 100 % (11/15/23 1105) Vital Signs (24h Range):  Temp:  [97.4 °F (36.3 °C)-98.4 °F (36.9 °C)] 98.4 °F (36.9 °C)  Pulse:  [69-87] 83  Resp:  [15-28] 24  SpO2:  [95 %-100 %] 100 %  BP: ()/(53-78) 118/72     Patient Vitals for the past 72 hrs (Last 3 readings):   Weight   11/14/23 0300 77.5 kg (170 lb 13.7 oz)       Body mass index is 23.17 kg/m².      Intake/Output Summary (Last 24 hours) at 11/15/2023  1132  Last data filed at 11/15/2023 1105  Gross per 24 hour   Intake 1303.81 ml   Output 1510 ml   Net -206.19 ml         Hemodynamic Parameters:            Physical Exam  Constitutional:       Comments: Alert, Oriented, No acute distress   HENT:      Head: Normocephalic and atraumatic.   Eyes:      Conjunctiva/sclera: Conjunctivae normal.   Neck:      Vascular: No hepatojugular reflux or JVD.      Comments: JVP does not appear elevated.   Cardiovascular:      Rate and Rhythm: Normal rate and regular rhythm.   Pulmonary:      Comments: Normal effort, Clear to auscultation   Abdominal:      Comments: Soft, Non-tender, Non-distended   Musculoskeletal:      Cervical back: Normal range of motion.      Right lower leg: No edema.      Left lower leg: No edema.      Comments: No peripheral edema   Skin:     Comments: Dry, Intact, Warm, Capillary refill <2 seconds.    Neurological:      Mental Status: He is alert and oriented to person, place, and time.      Comments: Normal speech   Psychiatric:         Mood and Affect: Mood and affect normal.            Significant Labs:  CBC:  Recent Labs   Lab 11/13/23  0504 11/14/23  0435 11/15/23  0401   WBC 4.24 4.20 7.02   RBC 2.59* 2.64* 2.77*   HGB 7.8* 7.8* 8.0*   HCT 23.5* 23.5* 24.9*    247 256   MCV 91 89 90   MCH 30.1 29.5 28.9   MCHC 33.2 33.2 32.1       BNP:  Recent Labs   Lab 11/09/23  0656   BNP 1,204*       CMP:  Recent Labs   Lab 11/09/23  0656 11/09/23  1607 11/10/23  1816 11/11/23  0508 11/14/23  0435 11/14/23  1836 11/14/23  2240 11/15/23  0401   *   < > 116*   < > 121* 187*  --  152*   CALCIUM 9.5   < > 9.2   < > 9.1 9.1  --  9.4   ALBUMIN 3.7  --  3.8  --   --   --   --   --    PROT 7.5  --  7.3  --   --   --   --   --    *   < > 138   < > 137 133*  --  134*   K 4.9   < > 4.8   < > 4.2 5.6* 4.3 3.8   CO2 21*   < > 21*   < > 23 22*  --  23      < > 103   < > 102 98  --  97   BUN 57*   < > 57*   < > 62* 62*  --  60*   CREATININE 2.6*   < >  "2.5*   < > 3.1* 3.6*  --  3.2*   ALKPHOS 85  --  89  --   --   --   --   --    ALT 42  --  37  --   --   --   --   --    AST 26  --  19  --   --   --   --   --    BILITOT 0.8  --  1.0  --   --   --   --   --     < > = values in this interval not displayed.        Coagulation:   Recent Labs   Lab 11/11/23  0958 11/11/23  1600 11/14/23  0042 11/14/23  0435 11/15/23  0401   INR 1.2  --   --   --   --    APTT 31.3   < > 58.1* 55.8* 49.4*    < > = values in this interval not displayed.       LDH:  No results for input(s): "LDH" in the last 72 hours.    Microbiology:  Microbiology Results (last 7 days)       ** No results found for the last 168 hours. **            I have reviewed all pertinent labs within the past 24 hours.    Estimated Creatinine Clearance: 26.6 mL/min (A) (based on SCr of 3.2 mg/dL (H)).    Diagnostic Results:  I have reviewed all pertinent imaging results/findings within the past 24 hours.  Assessment and Plan:     63 year old male with hx of CAD s/p 3v CABG (unclear anatomy, 2009), ICM with a recent EF of 15-20% s/p ICD (medtronik 2009), DM2 (a1c 7.7), HTN, HLD, Vfib on amio who presents to the ED with CC of SOB     Pt was recently admitted to [facility] as a transfer.  He was started on a Lasix gtt and did well.  Started on gDMT and discharged home on  5 with plans to follow up in HTS clinic for ongoing transplant evaluation at another facility.  He says that about a few days ago he started to notice LE swelling.  This turned into Nelson and orthopnea.  He says he can't walk to the bathroom without getting SOB.  He also complains of weight gain.  He takes torsemide 20mg BID at home and was told to trial additional Lasix which he did without any improvement.  He was rx metolazone but has not been filled.  He came to the ED     In the ED he was AF with stable VS on RA.  CBC showing chronic anemia.  CMP notable for acute on chronic CKD with baseline around 2.1 and Cr now 2.6.  BNP elevated.  Lactate neg.  CXR " showing pulmonary edema.  I evaluated the pt at the bedside.  Bedside TTE showing CVP >15.  He was subsequently admitted to the CCU for diuresis.     He was continued on his home  and was started on a lasix gtt, which he responded well to overnight (net -1700cc. He feels much better this morning as well. Our transplant coordinators have been working on insurance approval and he is now being transferred to [facility] service for transplant evaluation.     * Acute on chronic combined systolic and diastolic CHF, NYHA class 4  Pt with known ICM with an EF of 25% on home  presenting with decompensated HF.  Not in cardiogenic shock    [facility] 11/14: RA 14, PA 70/35, PCWP 32, CO 4.1, CI 2.1.     - Home  gtt at 5  - home GDMT: entresto 24/26mg BID (on hold 2/2 IVÁN), Hydralazine 25 q8h being continued  - home diuretics: Was taking lasix 40 bid  - CVP 3 this AM Contine  5. Lasix on hold. Monitor CVP and sCr trend.  - CTS to follow regarding potential need for upgraded support to temporary MCS as bridge to advanced therapies.l   - Continue VAD/OHTx workup. On step 2.     PAF (paroxysmal atrial fibrillation)  Known hx of pAF. In sinus rhythm on admission, but 1 run of AF RVR overnight. He spontaneously converted.  - Continue home amiodarone 400mg qd  - Stop home Eliquis and continue heparin gtt while in the hospital.       IVÁN (acute kidney injury)  IVÁN on CKD2. Baseline Cr of 1.7-2.0.   Currently downtrending with holding of diuresis  - Trend along with CVP  - Hold ARNi, entresto    Type 2 diabetes mellitus without complication, without long-term current use of insulin  A1c 7.6, not insulin dependent  - MDSSI  - Consider endocrine if uncontrolled    CAD (coronary artery disease)  S/p 3vCABG in 2009  - continue home ASA, HI statin    Ventricular tachycardia  Hx VT s/p ICD placement (medtronic 2009)  - Continue home amio 400mg qd      Uninterrupted Critical Care/Counseling Time (not including procedures): 60 min.       Jimy DANG  GEETA An  Heart Transplant  [facility] - Cardiac Intensive Care

## 2023-11-15 NOTE — SUBJECTIVE & OBJECTIVE
"Interval HPI:   No acute events overnight. Patient in room [id number] A. Blood glucose stable. BG at and above goal on current insulin regimen (SSI and prandial insulin). Steroid use- None .     Renal function- Abnormal -   Vasopressors-  None      Diet diabetic Cardiac (Low Na/Chol);  Calorie         Eatin%  Nausea: No  Hypoglycemia and intervention: No  Fever: No  TPN and/or TF: No    /66 (BP Location: Left forearm, Patient Position: Sitting)   Pulse 83   Temp 98.4 °F (36.9 °C) (Oral)   Resp (!) 25   Ht 6' (1.829 m)   Wt 77.5 kg (170 lb 13.7 oz)   SpO2 100%   BMI 23.17 kg/m²     Labs Reviewed and Include    Recent Labs   Lab 11/15/23  0401   *   CALCIUM 9.4   *   K 3.8   CO2 23   CL 97   BUN 60*   CREATININE 3.2*     Lab Results   Component Value Date    WBC 7.02 11/15/2023    HGB 8.0 (L) 11/15/2023    HCT 24.9 (L) 11/15/2023    MCV 90 11/15/2023     11/15/2023     No results for input(s): "TSH", "FREET4" in the last 168 hours.  Lab Results   Component Value Date    HGBA1C 7.6 (H) 10/12/2023       Nutritional status:   Body mass index is 23.17 kg/m².  Lab Results   Component Value Date    ALBUMIN 3.8 11/10/2023    ALBUMIN 3.7 2023    ALBUMIN 3.1 (L) 2023     No results found for: "PREALBUMIN"    Estimated Creatinine Clearance: 26.6 mL/min (A) (based on SCr of 3.2 mg/dL (H)).    Accu-Checks  Recent Labs     23  1610 23  1928 23  0729 23  1241 23  1741 23  2206 23  0745 23  1201 23  1653 23   POCTGLUCOSE 148* 230* 163* 220* 212* 181* 111* 205* 117* 253*       Current Medications and/or Treatments Impacting Glycemic Control  Immunotherapy:    Immunosuppressants       None          Steroids:   Hormones (From admission, onward)      None          Pressors:    Autonomic Drugs (From admission, onward)      None          Hyperglycemia/Diabetes Medications:   Antihyperglycemics (From admission, " onward)      Start     Stop Route Frequency Ordered    11/13/23 1645  insulin aspart U-100 pen 7 Units         -- SubQ 3 times daily with meals 11/13/23 1444    11/10/23 1602  insulin aspart U-100 pen 0-5 Units         -- SubQ Before meals & nightly PRN 11/10/23 1508

## 2023-11-15 NOTE — PROGRESS NOTES
[facility] - Cardiac Intensive Care  Adult Nutrition  Progress Note    SUMMARY       Recommendations  1. Continue Diabetic/Cardiac diet-Fluid per MD   2. RD following    Goals: Meet % of EEN/EPN by RD f/u date  Nutrition Goal Status: progressing towards goal  Communication of RD Recs: POC    Assessment and Plan    Nutrition Problem  Increased protein needs     Related to (etiology):   Physiological needs     Signs and Symptoms (as evidenced by):   HF    Interventions (treatment strategy):  Collaboration of nutrition care w/ other providers     Nutrition Diagnosis Status:   New      Reason for Assessment    Reason For Assessment: verbal consult   Diagnosis: cardiac disease  Relevant Medical History: -  Interdisciplinary Rounds: attended  General Information Comments: Verbal consult to see pt. Spoke w/ pt and pt's caregiver at bedside, reports a good appetite however reports not always consuming the food provided in the hospital. Pt's caregiver brought pulled pork from outside restaurant. Discussed cardiac diet education at bedside, handouts provided to pt and pt's caregiver, no additional questions for me at this time. NFPE completed, 11/15; pt does not meet criteria for malnutrition at this time.  LBM noted-11/13/23  Nutrition Discharge Planning: Cardiac diet education provided 11/15- no additional questions for me at this time.    Nutrition Risk Screen    Nutrition Risk Screen: no indicators present    Nutrition/Diet History    Spiritual, Cultural Beliefs, Confucianist Practices, Values that Affect Care: no  Food Allergies: NKFA    Anthropometrics    Temp: 96.6 °F (35.9 °C)  Height Method: Stated  Height: 6' (182.9 cm)  Height (inches): 72 in  Weight Method: Standard Scale  Weight: 77.5 kg (170 lb 13.7 oz)  Weight (lb): 170.86 lb  Ideal Body Weight (IBW), Male: 178 lb  % Ideal Body Weight, Male (lb): 99.33 %  BMI (Calculated): 23.2  BMI Grade: 18.5-24.9 - normal       Lab/Procedures/Meds    Pertinent Labs  Reviewed: reviewed  Pertinent Labs Comments: Sodium 133, BUN 64, Cr 3.4, GFR 19.7, Glucose 223  Pertinent Medications Reviewed: reviewed  Pertinent Medications Comments: MVI    Estimated/Assessed Needs    Weight Used For Calorie Calculations: 77.5 kg (170 lb 13.7 oz)  Energy Calorie Requirements (kcal): 1782  Energy Need Method: Kcal/kg (23 kcal/kg)  Protein Requirements: 93 g (1.2 g/kg)  Weight Used For Protein Calculations: 77.5 kg (170 lb 13.7 oz)   RDA Method (mL): 1782         Nutrition Prescription Ordered    Current Diet Order: Diabetic, Cardiac diet    Evaluation of Received Nutrient/Fluid Intake    I/O: -  Energy Calories Required: meeting needs  Protein Required: meeting needs  Fluid Required: meeting needs  Total Fluid Intake (mL/kg): 1 ml or fluid per MD  Tolerance: tolerating  % Intake of Estimated Energy Needs: 75 - 100 %  % Meal Intake: 75 - 100 %    Nutrition Risk    Level of Risk/Frequency of Follow-up: low (2x/week)    Monitor and Evaluation    Food and Nutrient Intake: energy intake, food and beverage intake  Food and Nutrient Adminstration: diet order  Knowledge/Beliefs/Attitudes: food and nutrition knowledge/skill, beliefs and attitudes  Physical Activity and Function: nutrition-related ADLs and IADLs, factors affecting access to physical activity  Anthropometric Measurements: height/length, weight, weight change, body mass index, growth pattern indices/percentile ranks  Biochemical Data, Medical Tests and Procedures: electrolyte and renal panel, gastrointestinal profile, glucose/endocrine profile, inflammatory profile, lipid profile  Nutrition-Focused Physical Findings: overall appearance, extremities, muscles and bones, head and eyes, skin     Nutrition Follow-Up    RD Follow-up?: Yes

## 2023-11-15 NOTE — NURSING
At the request of Dr. Forte, I have been asked to meet patient and provide VAD education. Introduced self and reason for visit. Pt in [facility] but wife, Arlyn, at bedside. Provided phase 1 written VAD education. Included in Phase 1 folder is the following:     Evaluation Eval for [facility]  [email or web address] Flyer  [employer]ated Text Message Education Videos  Pictures of examples of VADs     Explained the work up process including possible outcomes.     Explained to look over the entire contents and read Evaluation Eval for [facility] acknowledgement form. Encouraged patient to partake in [employer] Text Message Education Videos. Also explained that they should bring this folder with them to all clinic visits and if they are admitted to the hospital so that we can continue education as needed. Should there be any questions, please write them down and bring with you or feel free to call and we can talk on the phone. All questions answered to wife's satisfaction as evidence by verbal acknowledgement.

## 2023-11-15 NOTE — NURSING
Rounded on patient. Wife and U/S at bedside. Wife states that they started to read over the education. The only question right now was about her not being able to stay with the patient on the night of surgery. I reviewed that we would set up that night for her in the [place] house but only for that one night. Wife verbalized understanding. Patient did not have any questions at this time. He was focused on being out of the bed so that he wouldn't get too weak.

## 2023-11-15 NOTE — NURSING
0730 Unable to obtain svo2 this am d/t PICC not drawing back. DENIZ Abdullahi notified. Jimy to discuss with attending during rounds possible TLC placement.      0945 Discussed during rounds with MD Albert. [facility] fellow to place TLC this afternoon.      1630 TLC placed by Connor Gillies, DO. Orders placed for stat CXR to confirm placement. Plans to obtain hemodynamics when placement confirmed.      1830 Still waiting on CXR. Per Gillies, DO, okay to have night shift obtain hemodynamics once placement confirmed.

## 2023-11-15 NOTE — ASSESSMENT & PLAN NOTE
BG goal 140-180   T2DM.  BG at or above goal.  Will continue to monitor on regimen.    Continue Novolog 7 units TID with meals   Low Dose Correction Scale (given kidney function)  BG monitoring ac/hs    ** Please call Endocrine for any BG related issues **    Discharge plans: TBD    Lab Results   Component Value Date    HGBA1C 7.6 (H) 10/12/2023

## 2023-11-16 LAB
ALLENS TEST: ABNORMAL
ANION GAP SERPL CALC-SCNC: 12 MMOL/L (ref 8–16)
ANION GAP SERPL CALC-SCNC: 13 MMOL/L (ref 8–16)
APTT PPP: 56 SEC (ref 21–32)
BASOPHILS # BLD AUTO: 0.02 K/UL (ref 0–0.2)
BASOPHILS NFR BLD: 0.3 % (ref 0–1.9)
BUN SERPL-MCNC: 63 MG/DL (ref 8–23)
BUN SERPL-MCNC: 64 MG/DL (ref 8–23)
CALCIUM SERPL-MCNC: 8.9 MG/DL (ref 8.7–10.5)
CALCIUM SERPL-MCNC: 9.1 MG/DL (ref 8.7–10.5)
CHLORIDE SERPL-SCNC: 97 MMOL/L (ref 95–110)
CHLORIDE SERPL-SCNC: 98 MMOL/L (ref 95–110)
CO2 SERPL-SCNC: 22 MMOL/L (ref 23–29)
CO2 SERPL-SCNC: 23 MMOL/L (ref 23–29)
CREAT SERPL-MCNC: 3 MG/DL (ref 0.5–1.4)
CREAT SERPL-MCNC: 3.3 MG/DL (ref 0.5–1.4)
DELSYS: ABNORMAL
DIFFERENTIAL METHOD BLD: ABNORMAL
EBV VCA IGG SER QL IA: POSITIVE
EOSINOPHIL # BLD AUTO: 0 K/UL (ref 0–0.5)
EOSINOPHIL NFR BLD: 0.4 % (ref 0–8)
ERYTHROCYTE [DISTWIDTH] IN BLOOD BY AUTOMATED COUNT: 14.6 % (ref 11.5–14.5)
ERYTHROCYTE [SEDIMENTATION RATE] IN BLOOD BY WESTERGREN METHOD: 25 MM/H
EST. GFR  (NO RACE VARIABLE): 20.4 ML/MIN/1.73 M^2
EST. GFR  (NO RACE VARIABLE): 22.9 ML/MIN/1.73 M^2
FIO2: 21
GAMMA INTERFERON BACKGROUND BLD IA-ACNC: 0.04 IU/ML
GLUCOSE SERPL-MCNC: 157 MG/DL (ref 70–110)
GLUCOSE SERPL-MCNC: 328 MG/DL (ref 70–110)
HCO3 UR-SCNC: 23 MMOL/L (ref 24–28)
HCO3 UR-SCNC: 23.3 MMOL/L (ref 24–28)
HCT VFR BLD AUTO: 23.9 % (ref 40–54)
HGB BLD-MCNC: 7.8 G/DL (ref 14–18)
HSV1 IGG SERPL QL IA: POSITIVE
HSV2 IGG SERPL QL IA: NEGATIVE
IMM GRANULOCYTES # BLD AUTO: 0.02 K/UL (ref 0–0.04)
IMM GRANULOCYTES NFR BLD AUTO: 0.3 % (ref 0–0.5)
LYMPHOCYTES # BLD AUTO: 0.8 K/UL (ref 1–4.8)
LYMPHOCYTES NFR BLD: 11.7 % (ref 18–48)
M TB IFN-G CD4+ BCKGRND COR BLD-ACNC: 0 IU/ML
M TB IFN-G CD4+ BCKGRND COR BLD-ACNC: 0.01 IU/ML
MAGNESIUM SERPL-MCNC: 2.5 MG/DL (ref 1.6–2.6)
MCH RBC QN AUTO: 28.9 PG (ref 27–31)
MCHC RBC AUTO-ENTMCNC: 32.6 G/DL (ref 32–36)
MCV RBC AUTO: 89 FL (ref 82–98)
MITOGEN IGNF BCKGRD COR BLD-ACNC: 1.45 IU/ML
MODE: ABNORMAL
MONOCYTES # BLD AUTO: 0.5 K/UL (ref 0.3–1)
MONOCYTES NFR BLD: 6.7 % (ref 4–15)
NEUTROPHILS # BLD AUTO: 5.8 K/UL (ref 1.8–7.7)
NEUTROPHILS NFR BLD: 80.6 % (ref 38–73)
NRBC BLD-RTO: 0 /100 WBC
PCO2 BLDA: 33 MMHG (ref 35–45)
PCO2 BLDA: 34.5 MMHG (ref 35–45)
PH SMN: 7.44 [PH] (ref 7.35–7.45)
PH SMN: 7.45 [PH] (ref 7.35–7.45)
PHOSPHATE SERPL-MCNC: 5.2 MG/DL (ref 2.7–4.5)
PLATELET # BLD AUTO: 257 K/UL (ref 150–450)
PLATELET FUNCTION ASSAY - EPINEPHRINE: 126 SECS (ref 76–199)
PMV BLD AUTO: 9.4 FL (ref 9.2–12.9)
PO2 BLDA: 24 MMHG (ref 40–60)
PO2 BLDA: 25 MMHG (ref 40–60)
PO2 BLDA: 26 MMHG (ref 40–60)
POC BE: -1 MMOL/L
POC BE: -1 MMOL/L
POC SATURATED O2: 47 % (ref 95–100)
POC SATURATED O2: 47 % (ref 95–100)
POC SATURATED O2: 51 % (ref 95–100)
POC TCO2: 24 MMOL/L (ref 24–29)
POC TCO2: 24 MMOL/L (ref 24–29)
POCT GLUCOSE: 156 MG/DL (ref 70–110)
POCT GLUCOSE: 173 MG/DL (ref 70–110)
POCT GLUCOSE: 179 MG/DL (ref 70–110)
POCT GLUCOSE: 213 MG/DL (ref 70–110)
POCT GLUCOSE: 314 MG/DL (ref 70–110)
POTASSIUM SERPL-SCNC: 4 MMOL/L (ref 3.5–5.1)
POTASSIUM SERPL-SCNC: 4.3 MMOL/L (ref 3.5–5.1)
RBC # BLD AUTO: 2.7 M/UL (ref 4.6–6.2)
RPR SER QL: NORMAL
SAMPLE: ABNORMAL
SITE: ABNORMAL
SODIUM SERPL-SCNC: 132 MMOL/L (ref 136–145)
SODIUM SERPL-SCNC: 133 MMOL/L (ref 136–145)
SP02: 100
TB GOLD PLUS: NEGATIVE
VARICELLA INTERPRETATION: POSITIVE
VARICELLA ZOSTER IGG: >4000 AU/ML
WBC # BLD AUTO: 7.21 K/UL (ref 3.9–12.7)

## 2023-11-16 PROCEDURE — 81373 HLA I TYPING 1 LOCUS LR: CPT | Mod: TXP | Performed by: PHYSICIAN ASSISTANT

## 2023-11-16 PROCEDURE — 80048 BASIC METABOLIC PNL TOTAL CA: CPT | Mod: NTX | Performed by: STUDENT IN AN ORGANIZED HEALTH CARE EDUCATION/TRAINING PROGRAM

## 2023-11-16 PROCEDURE — 83735 ASSAY OF MAGNESIUM: CPT | Mod: NTX | Performed by: STUDENT IN AN ORGANIZED HEALTH CARE EDUCATION/TRAINING PROGRAM

## 2023-11-16 PROCEDURE — 25000003 PHARM REV CODE 250: Mod: NTX | Performed by: REGISTERED NURSE

## 2023-11-16 PROCEDURE — 99223 1ST HOSP IP/OBS HIGH 75: CPT | Mod: NTX,,, | Performed by: INTERNAL MEDICINE

## 2023-11-16 PROCEDURE — 25000003 PHARM REV CODE 250: Mod: NTX | Performed by: STUDENT IN AN ORGANIZED HEALTH CARE EDUCATION/TRAINING PROGRAM

## 2023-11-16 PROCEDURE — 99292 CRITICAL CARE ADDL 30 MIN: CPT | Mod: NTX,,, | Performed by: INTERNAL MEDICINE

## 2023-11-16 PROCEDURE — 82803 BLOOD GASES ANY COMBINATION: CPT | Mod: NTX

## 2023-11-16 PROCEDURE — 20000000 HC ICU ROOM: Mod: NTX

## 2023-11-16 PROCEDURE — 63600367 HC NITRIC OXIDE PER HOUR: Mod: NTX

## 2023-11-16 PROCEDURE — 99232 SBSQ HOSP IP/OBS MODERATE 35: CPT | Mod: NTX,,, | Performed by: PHYSICIAN ASSISTANT

## 2023-11-16 PROCEDURE — 63600175 PHARM REV CODE 636 W HCPCS: Mod: NTX | Performed by: STUDENT IN AN ORGANIZED HEALTH CARE EDUCATION/TRAINING PROGRAM

## 2023-11-16 PROCEDURE — 25000003 PHARM REV CODE 250: Mod: NTX | Performed by: PHYSICIAN ASSISTANT

## 2023-11-16 PROCEDURE — 81376 HLA II TYPING 1 LOCUS LR: CPT | Mod: 59,TXP | Performed by: PHYSICIAN ASSISTANT

## 2023-11-16 PROCEDURE — 25000003 PHARM REV CODE 250: Mod: NTX | Performed by: INTERNAL MEDICINE

## 2023-11-16 PROCEDURE — 81373 HLA I TYPING 1 LOCUS LR: CPT | Mod: 59,TXP | Performed by: PHYSICIAN ASSISTANT

## 2023-11-16 PROCEDURE — 99900035 HC TECH TIME PER 15 MIN (STAT): Mod: NTX

## 2023-11-16 PROCEDURE — 27000221 HC OXYGEN, UP TO 24 HOURS: Mod: NTX

## 2023-11-16 PROCEDURE — 99291 CRITICAL CARE FIRST HOUR: CPT | Mod: FS,NTX,, | Performed by: INTERNAL MEDICINE

## 2023-11-16 PROCEDURE — 84100 ASSAY OF PHOSPHORUS: CPT | Mod: NTX | Performed by: STUDENT IN AN ORGANIZED HEALTH CARE EDUCATION/TRAINING PROGRAM

## 2023-11-16 PROCEDURE — 81376 HLA II TYPING 1 LOCUS LR: CPT | Mod: TXP | Performed by: PHYSICIAN ASSISTANT

## 2023-11-16 PROCEDURE — 86825 HLA X-MATH NON-CYTOTOXIC: CPT | Performed by: PHYSICIAN ASSISTANT

## 2023-11-16 PROCEDURE — 86825 HLA X-MATH NON-CYTOTOXIC: CPT | Mod: 91 | Performed by: PHYSICIAN ASSISTANT

## 2023-11-16 PROCEDURE — 85730 THROMBOPLASTIN TIME PARTIAL: CPT | Mod: NTX | Performed by: STUDENT IN AN ORGANIZED HEALTH CARE EDUCATION/TRAINING PROGRAM

## 2023-11-16 PROCEDURE — 94761 N-INVAS EAR/PLS OXIMETRY MLT: CPT | Mod: NTX

## 2023-11-16 PROCEDURE — 63600175 PHARM REV CODE 636 W HCPCS: Mod: NTX | Performed by: REGISTERED NURSE

## 2023-11-16 PROCEDURE — 85576 BLOOD PLATELET AGGREGATION: CPT | Mod: NTX | Performed by: INTERNAL MEDICINE

## 2023-11-16 PROCEDURE — 86832 HLA CLASS I HIGH DEFIN QUAL: CPT | Performed by: PHYSICIAN ASSISTANT

## 2023-11-16 PROCEDURE — 80048 BASIC METABOLIC PNL TOTAL CA: CPT | Mod: 91,NTX | Performed by: REGISTERED NURSE

## 2023-11-16 PROCEDURE — 85025 COMPLETE CBC W/AUTO DIFF WBC: CPT | Mod: NTX | Performed by: STUDENT IN AN ORGANIZED HEALTH CARE EDUCATION/TRAINING PROGRAM

## 2023-11-16 RX ORDER — INSULIN ASPART 100 [IU]/ML
9 INJECTION, SOLUTION INTRAVENOUS; SUBCUTANEOUS
Status: DISCONTINUED | OUTPATIENT
Start: 2023-11-16 | End: 2023-11-18

## 2023-11-16 RX ORDER — ACETAMINOPHEN 325 MG/1
650 TABLET ORAL EVERY 6 HOURS PRN
Status: DISCONTINUED | OUTPATIENT
Start: 2023-11-16 | End: 2023-12-04

## 2023-11-16 RX ADMIN — SENNOSIDES AND DOCUSATE SODIUM 2 TABLET: 8.6; 5 TABLET ORAL at 09:11

## 2023-11-16 RX ADMIN — ACETAMINOPHEN 650 MG: 325 TABLET ORAL at 09:11

## 2023-11-16 RX ADMIN — HYDRALAZINE HYDROCHLORIDE 25 MG: 25 TABLET, FILM COATED ORAL at 01:11

## 2023-11-16 RX ADMIN — INSULIN ASPART 7 UNITS: 100 INJECTION, SOLUTION INTRAVENOUS; SUBCUTANEOUS at 12:11

## 2023-11-16 RX ADMIN — Medication 400 MG: at 08:11

## 2023-11-16 RX ADMIN — HEPARIN SODIUM AND DEXTROSE 16 UNITS/KG/HR: 10000; 5 INJECTION INTRAVENOUS at 06:11

## 2023-11-16 RX ADMIN — GABAPENTIN 300 MG: 300 CAPSULE ORAL at 09:11

## 2023-11-16 RX ADMIN — ASPIRIN 81 MG: 81 TABLET, COATED ORAL at 08:11

## 2023-11-16 RX ADMIN — INSULIN ASPART 9 UNITS: 100 INJECTION, SOLUTION INTRAVENOUS; SUBCUTANEOUS at 04:11

## 2023-11-16 RX ADMIN — INSULIN ASPART 4 UNITS: 100 INJECTION, SOLUTION INTRAVENOUS; SUBCUTANEOUS at 12:11

## 2023-11-16 RX ADMIN — HYDRALAZINE HYDROCHLORIDE 25 MG: 25 TABLET, FILM COATED ORAL at 09:11

## 2023-11-16 RX ADMIN — ATORVASTATIN CALCIUM 40 MG: 40 TABLET, FILM COATED ORAL at 08:11

## 2023-11-16 RX ADMIN — FUROSEMIDE 5 MG/HR: 10 INJECTION, SOLUTION INTRAMUSCULAR; INTRAVENOUS at 09:11

## 2023-11-16 RX ADMIN — ACETAMINOPHEN 650 MG: 325 TABLET ORAL at 10:11

## 2023-11-16 RX ADMIN — AMIODARONE HYDROCHLORIDE 400 MG: 200 TABLET ORAL at 08:11

## 2023-11-16 RX ADMIN — INSULIN ASPART 2 UNITS: 100 INJECTION, SOLUTION INTRAVENOUS; SUBCUTANEOUS at 04:11

## 2023-11-16 RX ADMIN — DOBUTAMINE IN DEXTROSE 5 MCG/KG/MIN: 400 INJECTION, SOLUTION INTRAVENOUS at 01:11

## 2023-11-16 RX ADMIN — THERA TABS 1 TABLET: TAB at 08:11

## 2023-11-16 RX ADMIN — SENNOSIDES AND DOCUSATE SODIUM 2 TABLET: 8.6; 5 TABLET ORAL at 08:11

## 2023-11-16 RX ADMIN — PANTOPRAZOLE SODIUM 40 MG: 40 TABLET, DELAYED RELEASE ORAL at 06:11

## 2023-11-16 NOTE — SUBJECTIVE & OBJECTIVE
Interval HPI:   No acute events overnight. Patient in room [id number] A. Blood glucose stable. BG at and above goal on current insulin regimen (SSI and prandial insulin). Steroid use- None .   3 Days Post-Op  Renal function- Abnormal - Cr 3.0   Vasopressors-  None     Diet NPO Except for: Medication     Eating:   NPO  Nausea: No  Hypoglycemia and intervention: No  Fever: No  TPN and/or TF: No    /69   Pulse 82   Temp 98.5 °F (36.9 °C) (Oral)   Resp (!) 21   Ht 6' (1.829 m)   Wt 77.5 kg (170 lb 13.7 oz)   SpO2 100%   BMI 23.17 kg/m²     Labs Reviewed and Include    Recent Labs   Lab 11/16/23  0436   *   CALCIUM 9.1   *   K 4.0   CO2 23   CL 97   BUN 63*   CREATININE 3.0*     Lab Results   Component Value Date    WBC 7.21 11/16/2023    HGB 7.8 (L) 11/16/2023    HCT 23.9 (L) 11/16/2023    MCV 89 11/16/2023     11/16/2023     Recent Labs   Lab 11/15/23  1720   TSH 1.381     Lab Results   Component Value Date    HGBA1C 7.6 (H) 10/12/2023       Nutritional status:   Body mass index is 23.17 kg/m².  Lab Results   Component Value Date    ALBUMIN 3.8 11/10/2023    ALBUMIN 3.7 11/09/2023    ALBUMIN 3.1 (L) 11/01/2023     Lab Results   Component Value Date    PREALBUMIN 27 11/15/2023       Estimated Creatinine Clearance: 28.3 mL/min (A) (based on SCr of 3 mg/dL (H)).    Accu-Checks  Recent Labs     11/13/23  1741 11/13/23  2206 11/14/23  0745 11/14/23  1201 11/14/23  1653 11/14/23  2123 11/15/23  0746 11/15/23  1207 11/15/23  1629 11/16/23  0755   POCTGLUCOSE 212* 181* 111* 205* 117* 253* 147* 214* 216* 173*       Current Medications and/or Treatments Impacting Glycemic Control  Immunotherapy:    Immunosuppressants       None          Steroids:   Hormones (From admission, onward)      None          Pressors:    Autonomic Drugs (From admission, onward)      None          Hyperglycemia/Diabetes Medications:   Antihyperglycemics (From admission, onward)      Start     Stop Route Frequency  Ordered    11/13/23 1645  insulin aspart U-100 pen 7 Units         -- SubQ 3 times daily with meals 11/13/23 1444    11/10/23 1602  insulin aspart U-100 pen 0-5 Units         -- SubQ Before meals & nightly PRN 11/10/23 1509

## 2023-11-16 NOTE — HPI
73 y/o white F w/ PMH of NICM, HFrEF (EF 15%), Afib post bioproesthetic MVR and PFO repair (11/2023) on eliquis, and hypothyroidism who initially presented to the [facility] on 1/2/[facility]24 for ADHF. Course complicated by cardiogenic shock. S/p HeartMate 3 12/1/2023  as DT

## 2023-11-16 NOTE — PROGRESS NOTES
Update:  SW met with pt and his wife.  Completed psychosocial.  SW also met with pt's sister, Teetee Zhou, (secondary caregiver). Pt called his sister on face time for SW meeting face to face.  Psychosocial to follow. .

## 2023-11-16 NOTE — PROGRESS NOTES
Interdisciplinary Rounds Report:   Attended interdisciplinary rounds with the [facility]/CTS services including the LVAD Coordinators, social workers, cardiologists, surgeons,  PT/OT/Speech, dietician, and unit charge nurses. Discussed patient status, plan of care, goals of care, including DC date, and post discharge needs. Plan of care will be discussed with the patient and/or family per the physician while rounding on the floor. This is a recurring meeting that is medically and socially necessary to collaborate with the interdisciplinary team to assist patient needs and safe discharge.

## 2023-11-16 NOTE — PROGRESS NOTES
[facility] - Cardiac Intensive Care  Heart Transplant  Progress Note    Patient Name: Radha Abbott  MRN: [id number]  Admission Date: 11/9/2023  Hospital Length of Stay: 7 days  Attending Physician: Sandhya Price MD  Primary Care Provider: Vasu Kong MD  Principal Problem:Acute on chronic combined systolic and diastolic CHF, NYHA class 4    Subjective:   Interval History: pt started on Lasix gtt overnight for rise in CVP. Responded appropriately. Net negative 450cc this AM. No complaints this AM. Renal function improving with SrCr down to 3.0. Holding off on IABP placement today. Continues on  5 for inotropic support.     CVP 5  SvO2 47%  CO 5   CI 2.5  SVR 1,360      Continuous Infusions:   sodium chloride 0.9%      DOBUTamine 5 mcg/kg/min (11/16/23 1101)    furosemide (LASIX) 500 mg in 50 mL infusion (conc: 10 mg/mL) 10 mg/hr (11/16/23 1101)    heparin (porcine) in D5W 16 Units/kg/hr (11/16/23 1101)     Scheduled Meds:   amiodarone  400 mg Oral Daily    aspirin  81 mg Oral Daily    atorvastatin  40 mg Oral Daily    hydrALAZINE  25 mg Oral Q8H    insulin aspart U-100  7 Units Subcutaneous TIDWM    magnesium oxide  400 mg Oral Daily    multivitamin  1 tablet Oral Daily    pantoprazole  40 mg Oral Before breakfast    senna-docusate 8.6-50 mg  2 tablet Oral BID     PRN Meds:sodium chloride 0.9%, acetaminophen, dextrose 10%, dextrose 10%, gabapentin, glucagon (human recombinant), glucose, glucose, heparin (PORCINE), heparin (PORCINE), insulin aspart U-100    Review of patient's allergies indicates:  No Known Allergies  Objective:     Vital Signs (Most Recent):  Temp: 97.4 °F (36.3 °C) (11/16/23 1101)  Pulse: 85 (11/16/23 1101)  Resp: (!) 24 (11/16/23 1101)  BP: (!) 119/55 (11/16/23 1101)  SpO2: 100 % (11/16/23 1101) Vital Signs (24h Range):  Temp:  [97.4 °F (36.3 °C)-98.5 °F (36.9 °C)] 97.4 °F (36.3 °C)  Pulse:  [82-94] 85  Resp:  [16-26] 24  SpO2:  [97 %-100 %] 100 %  BP: (100-122)/(53-83) 119/55  "    Patient Vitals for the past 72 hrs (Last 3 readings):   Weight   11/14/23 0300 77.5 kg (170 lb 13.7 oz)       Body mass index is 23.17 kg/m².      Intake/Output Summary (Last 24 hours) at 11/16/2023 1123  Last data filed at 11/16/2023 1101  Gross per 24 hour   Intake 1051.74 ml   Output 2127 ml   Net -1075.26 ml         Hemodynamic Parameters:            Physical Exam  Constitutional:       Comments: Alert, Oriented, No acute distress   HENT:      Head: Normocephalic and atraumatic.   Eyes:      Conjunctiva/sclera: Conjunctivae normal.   Neck:      Vascular: No hepatojugular reflux or JVD.      Comments: JVP does not appear elevated.   Cardiovascular:      Rate and Rhythm: Normal rate and regular rhythm.   Pulmonary:      Comments: Normal effort, Clear to auscultation   Abdominal:      Comments: Soft, Non-tender, Non-distended   Musculoskeletal:      Cervical back: Normal range of motion.      Right lower leg: No edema.      Left lower leg: No edema.      Comments: No peripheral edema   Skin:     Comments: Dry, Intact, Warm, Capillary refill <2 seconds.    Neurological:      Mental Status: He is alert and oriented to person, place, and time.      Comments: Normal speech   Psychiatric:         Mood and Affect: Mood and affect normal.            Significant Labs:  CBC:  Recent Labs   Lab 11/14/23  0435 11/15/23  0401 11/16/23  0436   WBC 4.20 7.02 7.21   RBC 2.64* 2.77* 2.70*   HGB 7.8* 8.0* 7.8*   HCT 23.5* 24.9* 23.9*    256 257   MCV 89 90 89   MCH 29.5 28.9 28.9   MCHC 33.2 32.1 32.6       BNP:  No results for input(s): "BNP" in the last 168 hours.    Invalid input(s): "BNPTRIAGELBLO"    CMP:  Recent Labs   Lab 11/10/23  1816 11/11/23  0508 11/15/23  0401 11/15/23  1334 11/16/23  0436   *   < > 152* 223* 157*   CALCIUM 9.2   < > 9.4 9.2 9.1   ALBUMIN 3.8  --   --   --   --    PROT 7.3  --   --   --   --       < > 134* 133* 133*   K 4.8   < > 3.8 4.2 4.0   CO2 21*   < > 23 22* 23      " < > 97 97 97   BUN 57*   < > 60* 64* 63*   CREATININE 2.5*   < > 3.2* 3.4* 3.0*   ALKPHOS 89  --   --   --   --    ALT 37  --   --   --   --    AST 19  --   --   --   --    BILITOT 1.0  --   --   --   --     < > = values in this interval not displayed.        Coagulation:   Recent Labs   Lab 11/11/23  0958 11/11/23  1600 11/14/23  0435 11/15/23  0401 11/15/23  1601 11/16/23  0436   INR 1.2  --   --   --  1.1  --    APTT 31.3   < > 55.8* 49.4*  --  56.0*    < > = values in this interval not displayed.       LDH:  Recent Labs   Lab 11/15/23  1601          Microbiology:  Microbiology Results (last 7 days)       ** No results found for the last 168 hours. **            I have reviewed all pertinent labs within the past 24 hours.    Estimated Creatinine Clearance: 28.3 mL/min (A) (based on SCr of 3 mg/dL (H)).    Diagnostic Results:  I have reviewed all pertinent imaging results/findings within the past 24 hours.  Assessment and Plan:     63 year old male with hx of CAD s/p 3v CABG (unclear anatomy, 2009), ICM with a recent EF of 15-20% s/p ICD (medtronik 2009), DM2 (a1c 7.7), HTN, HLD, Vfib on amio who presents to the ED with CC of SOB     Pt was recently admitted to [facility] [place], [place] as a transfer.  He was started on a Lasix gtt and did well.  Started on gDMT and discharged home on  5 with plans to follow up in HTS clinic for ongoing transplant evaluation at another facility.  He says that about a few days ago he started to notice LE swelling.  This turned into Nelson and orthopnea.  He says he can't walk to the bathroom without getting SOB.  He also complains of weight gain.  He takes torsemide 20mg BID at home and was told to trial additional Lasix which he did without any improvement.  He was rx metolazone but has not been filled.  He came to the ED     In the ED he was AF with stable VS on RA.  CBC showing chronic anemia.  CMP notable for acute on chronic CKD with baseline around 2.1 and Cr now 2.6.  BNP elevated.   Lactate neg.  CXR showing pulmonary edema.  I evaluated the pt at the bedside.  Bedside TTE showing CVP >15.  He was subsequently admitted to the CCU for diuresis.     He was continued on his home  and was started on a lasix gtt, which he responded well to overnight (net -1700cc. He feels much better this morning as well. Our transplant coordinators have been working on insurance approval and he is now being transferred to [facility] service for transplant evaluation.     * Acute on chronic combined systolic and diastolic CHF, NYHA class 4  Pt with known ICM with an EF of 25% on home  presenting with decompensated HF.  Not in cardiogenic shock    [facility] 11/14: RA 14, PA 70/35, PCWP 32, CO 4.1, CI 2.1.     - Home  gtt at 5  - home GDMT: entresto 24/26mg BID (on hold 2/2 IVÁN), Hydralazine 25 q8h being continued  - home diuretics: Was taking lasix 40 bid  - CVP 5 this AM Contine  5. Continue Lasix gtt to keep net negative. Monitor CVP and sCr trend.  - CTS to follow regarding potential need for upgraded support to temporary MCS as bridge to advanced therapies.l   - Continue VAD/OHTx workup. On step 2.   -PT/OT and nutrition     PAF (paroxysmal atrial fibrillation)  Known hx of pAF. In sinus rhythm on admission, but 1 run of AF RVR overnight. He spontaneously converted.  - Continue home amiodarone 400mg qd  - Stop home Eliquis and continue heparin gtt while in the hospital.       IVÁN (acute kidney injury)  IVÁN on CKD2. Baseline Cr of 1.7-2.0.   Currently downtrending to 3.0  - Trend along with CVP  - Hold ARNi, entresto  -Trend BMPs daily     Type 2 diabetes mellitus without complication, without long-term current use of insulin  -A1c 7.6, not insulin dependent  - MDSSI  -diabetic diet ordered/Boost glucose control   - Consider endocrine if uncontrolled    CAD (coronary artery disease)  -S/p 3vCABG in 2009  - continue home ASA, HI statin    Ventricular tachycardia  Hx VT s/p ICD placement (medtronic 2009)  - Continue  home amio 400mg qd  -monitor telemetry     .Uninterrupted Critical Care/Counseling Time (not including procedures): 50 mins       Hugh Duran NP  Heart Transplant  [facility] - Cardiac Intensive Care

## 2023-11-16 NOTE — PLAN OF CARE
HTS Care Update Note     CVP: 8  SVO2:51   CI: 2.9  CO: 5.6  SVR: 960    UO: 517, 43 cc/h net +673 cc    Infusions:  gtt 5, Hep gtt     Mechanical support: none    Plan:  IVP lasix 80 followed by gtt 10 for being net positive and CVP 8  Plan discussed with attending     Omid Manzanares MD  Cardiovascular Disease PGY-V  [facility]

## 2023-11-16 NOTE — SUBJECTIVE & OBJECTIVE
Interval History: pt started on Lasix gtt overnight for rise in CVP. Responded appropriately. Net negative 450cc this AM. Renal function improving with SrCr down to 3.0. Holding off on IABP placement today. Continues on  5 for inotropic support.     CVP 5  SvO2 47%  CO 5   CI 2.5  SVR 1,360      Continuous Infusions:   sodium chloride 0.9%      DOBUTamine 5 mcg/kg/min (11/16/23 1101)    furosemide (LASIX) 500 mg in 50 mL infusion (conc: 10 mg/mL) 10 mg/hr (11/16/23 1101)    heparin (porcine) in D5W 16 Units/kg/hr (11/16/23 1101)     Scheduled Meds:   amiodarone  400 mg Oral Daily    aspirin  81 mg Oral Daily    atorvastatin  40 mg Oral Daily    hydrALAZINE  25 mg Oral Q8H    insulin aspart U-100  7 Units Subcutaneous TIDWM    magnesium oxide  400 mg Oral Daily    multivitamin  1 tablet Oral Daily    pantoprazole  40 mg Oral Before breakfast    senna-docusate 8.6-50 mg  2 tablet Oral BID     PRN Meds:sodium chloride 0.9%, acetaminophen, dextrose 10%, dextrose 10%, gabapentin, glucagon (human recombinant), glucose, glucose, heparin (PORCINE), heparin (PORCINE), insulin aspart U-100    Review of patient's allergies indicates:  No Known Allergies  Objective:     Vital Signs (Most Recent):  Temp: 97.4 °F (36.3 °C) (11/16/23 1101)  Pulse: 85 (11/16/23 1101)  Resp: (!) 24 (11/16/23 1101)  BP: (!) 119/55 (11/16/23 1101)  SpO2: 100 % (11/16/23 1101) Vital Signs (24h Range):  Temp:  [97.4 °F (36.3 °C)-98.5 °F (36.9 °C)] 97.4 °F (36.3 °C)  Pulse:  [82-94] 85  Resp:  [16-26] 24  SpO2:  [97 %-100 %] 100 %  BP: (100-122)/(53-83) 119/55     Patient Vitals for the past 72 hrs (Last 3 readings):   Weight   11/14/23 0300 77.5 kg (170 lb 13.7 oz)       Body mass index is 23.17 kg/m².      Intake/Output Summary (Last 24 hours) at 11/16/2023 1123  Last data filed at 11/16/2023 1101  Gross per 24 hour   Intake 1051.74 ml   Output 2127 ml   Net -1075.26 ml         Hemodynamic Parameters:            Physical Exam  Constitutional:        "Comments: Alert, Oriented, No acute distress   HENT:      Head: Normocephalic and atraumatic.   Eyes:      Conjunctiva/sclera: Conjunctivae normal.   Neck:      Vascular: No hepatojugular reflux or JVD.      Comments: JVP does not appear elevated.   Cardiovascular:      Rate and Rhythm: Normal rate and regular rhythm.   Pulmonary:      Comments: Normal effort, Clear to auscultation   Abdominal:      Comments: Soft, Non-tender, Non-distended   Musculoskeletal:      Cervical back: Normal range of motion.      Right lower leg: No edema.      Left lower leg: No edema.      Comments: No peripheral edema   Skin:     Comments: Dry, Intact, Warm, Capillary refill <2 seconds.    Neurological:      Mental Status: He is alert and oriented to person, place, and time.      Comments: Normal speech   Psychiatric:         Mood and Affect: Mood and affect normal.            Significant Labs:  CBC:  Recent Labs   Lab 11/14/23  0435 11/15/23  0401 11/16/23  0436   WBC 4.20 7.02 7.21   RBC 2.64* 2.77* 2.70*   HGB 7.8* 8.0* 7.8*   HCT 23.5* 24.9* 23.9*    256 257   MCV 89 90 89   MCH 29.5 28.9 28.9   MCHC 33.2 32.1 32.6       BNP:  No results for input(s): "BNP" in the last 168 hours.    Invalid input(s): "BNPTRIAGELBLO"    CMP:  Recent Labs   Lab 11/10/23  1816 11/11/23  0508 11/15/23  0401 11/15/23  1334 11/16/23  0436   *   < > 152* 223* 157*   CALCIUM 9.2   < > 9.4 9.2 9.1   ALBUMIN 3.8  --   --   --   --    PROT 7.3  --   --   --   --       < > 134* 133* 133*   K 4.8   < > 3.8 4.2 4.0   CO2 21*   < > 23 22* 23      < > 97 97 97   BUN 57*   < > 60* 64* 63*   CREATININE 2.5*   < > 3.2* 3.4* 3.0*   ALKPHOS 89  --   --   --   --    ALT 37  --   --   --   --    AST 19  --   --   --   --    BILITOT 1.0  --   --   --   --     < > = values in this interval not displayed.        Coagulation:   Recent Labs   Lab 11/11/23  0958 11/11/23  1600 11/14/23  0435 11/15/23  0401 11/15/23  1601 11/16/23  0436   INR 1.2  --   " --   --  1.1  --    APTT 31.3   < > 55.8* 49.4*  --  56.0*    < > = values in this interval not displayed.       LDH:  Recent Labs   Lab 11/15/23  1601          Microbiology:  Microbiology Results (last 7 days)       ** No results found for the last 168 hours. **            I have reviewed all pertinent labs within the past 24 hours.    Estimated Creatinine Clearance: 28.3 mL/min (A) (based on SCr of 3 mg/dL (H)).    Diagnostic Results:  I have reviewed all pertinent imaging results/findings within the past 24 hours.

## 2023-11-16 NOTE — ASSESSMENT & PLAN NOTE
IVÁN on CKD2. Baseline Cr of 1.7-2.0.   Currently downtrending to 3.0  - Trend along with CVP  - Hold ARNi, entresto  -Trend BMPs daily

## 2023-11-16 NOTE — CONSULTS
[facility] - Card[facility]  Interventional Cardiology  Consult Note    Patient Name: Radha Abbott  MRN: [id number]  Admission Date: 11/9/2023  Hospital Length of Stay: 7 days  Code Status: Full Code   Attending Provider: Sandhya Price MD  Consulting Provider: Clif Olmedo MD  Primary Care Physician: Vasu Kong MD  Principal Problem:Acute on chronic combined systolic and diastolic CHF, NYHA class 4    Patient information was obtained from patient and ER records.     Inpatient consult to Interventional Cardiology  Consult performed by: Clif Garrido MD  Consult ordered by: Jimy An PA-C        Subjective:     Chief Complaint:  Heart failure     HPI:  62yo M patient with history of CAD s/p 3v CABG (unclear anatomy, 2009), HFrEF ICM (EF 15-20%) s/p ICD (medtronic 2009), T2DM (Hba1c 7.7), HTN, HLD, Vfib (Amio), CKD (Cr 2.1), [place] [facility] [place] with CC of LE swelling, COELLO, SOB, orthopnea and weight gain, that did not improve with lasix. He was recently [place] [facility] [place] with GDMT,  5, and follow up as outpatient for ongoing transplant evaluatiion.      In the ED he was AF with stable VS on RA, chronic anemia, IVÁN on CKD (2.6 from 2.1) negative lactate, and congestion on physical exam, CXR and bedside TTE (CVP >15). Admitted for diuresis and continued  which he responded well and he was [place] [facility] [place] On [place] RHC showed RA 14, PA 70/35, PCWP 32, CO 4.1, CI 2.1.     He was improving in ICU, on home  5, hydralazine, and diuretics. Yesterday am, CVP 3, SvO2 53, CO 5.38, CI 2.72, SVR 1160 with some kidney improvement (Cr 3.6->3.2). However, during the afternoon, the patient's urine output was only 235cc all day, and CVP increased from 3 to 7. Lasix 60mg IV and then 80mg were given.     Overnight SVP 8, SvO2 51, CI 2.9, CO 5.6, , with a urine output of 517cc. Started on furosemide gtt. Of note, since admission I/Os -4.947  L, yesterday UOP 1.867L.     Today am Hb 7.8 baseline ~8), Cr 3 from 3.4-3.6,   Interventional cardiology consulted for advanced invasive mechanical support device/IABP placement.     Past Medical History:   Diagnosis Date    CAD (coronary artery disease)     Diabetes mellitus     HFrEF (heart failure with reduced ejection fraction)     ICD (implantable cardioverter-defibrillator) in place     MI, old        Past Surgical History:   Procedure Laterality Date    CARDIAC SURGERY      RIGHT HEART CATHETERIZATION Right 10/10/2023    Procedure: INSERTION, CATHETER, RIGHT HEART;  Surgeon: Bin Gandhi MD;  Location: [facility] CATH LAB;  Service: Cardiology;  Laterality: Right;    RIGHT HEART CATHETERIZATION Right 10/13/2023    Procedure: INSERTION, CATHETER, RIGHT HEART;  Surgeon: Walter Mcintyre MD;  Location: [facility] CATH LAB;  Service: Cardiology;  Laterality: Right;    RIGHT HEART CATHETERIZATION  11/13/2023    RIGHT HEART CATHETERIZATION Right 11/13/2023    Procedure: INSERTION, CATHETER, RIGHT HEART;  Surgeon: Juventino Bermudez Jr., MD;  Location: [facility] CATH LAB;  Service: Cardiology;  Laterality: Right;       Review of patient's allergies indicates:  No Known Allergies    PTA Medications   Medication Sig    amiodarone (PACERONE) 400 MG tablet Take 1 tablet (400 mg total) by mouth once daily.    apixaban (ELIQUIS) 5 mg Tab Take 1 tablet (5 mg total) by mouth 2 (two) times daily.    aspirin (ECOTRIN) 81 MG EC tablet Take 81 mg by mouth once daily.    atorvastatin (LIPITOR) 40 MG tablet Take 40 mg by mouth.    DOBUTamine (DOBUTREX) 1,000 mg/250 mL (4,000 mcg/mL) infusion Inject 389.5 mcg/min into the vein continuous.    fish oil-omega-3 fatty acids 300-1,000 mg capsule Take 2 g by mouth once daily.    furosemide (LASIX) 40 MG tablet Take 1 tablet (40 mg total) by mouth 2 (two) times daily.    gabapentin (NEURONTIN) 300 MG capsule Take 300 mg by mouth nightly as needed.    multivitamin capsule Take 1 capsule by mouth  once daily.    pantoprazole (PROTONIX) 40 MG tablet Take 1 tablet (40 mg total) by mouth before breakfast.    potassium chloride SA (K-DUR,KLOR-CON) 20 MEQ tablet Take 1 tablet (20 mEq total) by mouth once daily.    sacubitriL-valsartan (ENTRESTO) 24-26 mg per tablet Take 1 tablet by mouth 2 (two) times daily.    metOLazone (ZAROXOLYN) 2.5 MG tablet Take 1 tablet (2.5 mg total) by mouth once daily. Thursday 11/9 and Saturday 11/10    torsemide (DEMADEX) 20 MG Tab Take 2 tablets (40 mg total) by mouth 2 (two) times a day.     Family History    None       Tobacco Use    Smoking status: Every Day     Current packs/day: 0.50     Types: Cigarettes    Smokeless tobacco: Not on file   Substance and Sexual Activity    Alcohol use: Yes     Comment: rarely    Drug use: No    Sexual activity: Not on file     Review of Systems   Constitutional: Negative.   HENT: Negative.     Cardiovascular:  Negative for chest pain, claudication, cyanosis, dyspnea on exertion, irregular heartbeat, leg swelling, near-syncope, orthopnea, palpitations and paroxysmal nocturnal dyspnea.   Respiratory: Negative.     Endocrine: Negative.    Musculoskeletal: Negative.    Gastrointestinal: Negative.    Genitourinary: Negative.    Neurological: Negative.      Objective:     Vital Signs (Most Recent):  Temp: 98.5 °F (36.9 °C) (11/15/23 1505)  Pulse: 82 (11/16/23 0806)  Resp: (!) 21 (11/16/23 0806)  BP: 114/69 (11/16/23 0806)  SpO2: 100 % (11/16/23 0806) Vital Signs (24h Range):  Temp:  [96.6 °F (35.9 °C)-98.5 °F (36.9 °C)] 98.5 °F (36.9 °C)  Pulse:  [82-94] 82  Resp:  [16-28] 21  SpO2:  [97 %-100 %] 100 %  BP: (100-122)/(53-83) 114/69     Weight: 77.5 kg (170 lb 13.7 oz)  Body mass index is 23.17 kg/m².    SpO2: 100 %         Intake/Output Summary (Last 24 hours) at 11/16/2023 0905  Last data filed at 11/16/2023 0701  Gross per 24 hour   Intake 1010.92 ml   Output 1837 ml   Net -826.08 ml       Lines/Drains/Airways       Central Venous Catheter Line   Duration             Percutaneous Central Line Insertion/Assessment - Triple Lumen  11/14/23 1630 1 day              Drain  Duration                  Urethral Catheter 11/13/23 1800 Straight-tip 16 Fr. 2 days              Peripheral Intravenous Line  Duration                  Midline Catheter Insertion/Assessment  - Single Lumen 11/14/23 1113 Left brachial vein 20g x 10cm 1 day                     Physical Exam  Vitals and nursing note reviewed.   Constitutional:       General: He is not in acute distress.     Appearance: Normal appearance. He is normal weight. He is not ill-appearing or diaphoretic.   HENT:      Head: Normocephalic and atraumatic.   Eyes:      Pupils: Pupils are equal, round, and reactive to light.   Cardiovascular:      Rate and Rhythm: Normal rate and regular rhythm.      Pulses: Normal pulses.      Heart sounds: Normal heart sounds.      Comments: Chest central healed scar  Pulmonary:      Effort: Pulmonary effort is normal.      Breath sounds: Normal breath sounds.   Abdominal:      General: Abdomen is flat. Bowel sounds are normal. There is no distension.      Palpations: Abdomen is soft. There is no mass.      Tenderness: There is no abdominal tenderness.   Musculoskeletal:         General: Normal range of motion.   Skin:     General: Skin is warm.      Capillary Refill: Capillary refill takes less than 2 seconds.   Neurological:      General: No focal deficit present.      Mental Status: He is alert and oriented to person, place, and time.            Significant Labs:     Recent Labs   Lab 11/14/23  0435 11/15/23  0401 11/16/23  0436   WBC 4.20 7.02 7.21   HGB 7.8* 8.0* 7.8*   HCT 23.5* 24.9* 23.9*    256 257       Recent Labs   Lab 11/14/23  0435 11/14/23  1836 11/15/23  0401 11/15/23  1334 11/16/23  0436      < > 134* 133* 133*   K 4.2   < > 3.8 4.2 4.0      < > 97 97 97   CO2 23   < > 23 22* 23   BUN 62*   < > 60* 64* 63*   CREATININE 3.1*   < > 3.2* 3.4* 3.0*   CALCIUM  "9.1   < > 9.4 9.2 9.1   PHOS 4.9*  --  5.5*  --  5.2*    < > = values in this interval not displayed.       Recent Labs   Lab 11/10/23  1816 11/15/23  1720   ALKPHOS 89  --    BILITOT 1.0  --    BILIDIR  --  0.4*   PROT 7.3  --    ALT 37  --    AST 19  --        Recent Labs   Lab 11/15/23  1720   CHOL 107*   TRIG 74   HDL 49       No results for input(s): "CKTOTAL", "CPK", "TROPONINI", "TROPONINT", "TROPTSTAT", "CPKMB", "MB" in the last 168 hours.    Significant Imaging:     RHC 11/13/23  Study performed on  5 mcg/kg/min:     Right atrial pressure is moderately elevated.     Pulmonary artery pressure is moderately elevated.     Pulmonary wedge pressure is moderately severely elevated.     Pulmonary vascular resistance is mildly elevated 3.7 Wood units.     Systemic vascular resistance is 1327.     Reduced cardiac power output 0.38.     Normal CHINYERE 2.50.     RA: 19/ 16/ 14 RV: 65/ 20 PA: 70/ 35/ 47 PWP: 32/ 45/ 32 .   Cardiac output was 4.1 by Ana. Cardiac index is 2.1 L/min/m2.   O2 Sat: PA 32%.   Pulmonary vascular resistance: 293. Systemic vascular resistance: 1327.     TTE 10/19/23     Left Ventricle: The left ventricle is severely dilated. Moderately increased ventricular mass. Normal wall thickness. There is moderate eccentric hypertrophy. regional wall motion abnormalities present. There is severely reduced systolic function with a visually estimated ejection fraction of 15 - 20%. Biplane (2D) method of discs ejection fraction is 18%. There is diastolic dysfunction.    Left Atrium: Left atrium is severely dilated.    Right Ventricle: Normal right ventricular cavity size. Systolic function is normal.    Aortic Valve: There is mild aortic valve sclerosis.    Mitral Valve: There is mild to moderate regurgitation.    IVC/SVC: Intermediate venous pressure at 8 mmHg.          Assessment and Plan:     Cardiac/Vascular  * Acute on chronic combined systolic and diastolic CHF, NYHA class 4  64yo M patient with " history of CAD s/p 3v CABG (unclear anatomy, 2009), HFrEF ICM (EF 15-20%) s/p ICD (medtronic 2009), T2DM (Hba1c 7.7), HTN, HLD, Vfib (Amio), CKD (Cr 2.1), admitted to [facility] on 11/09/23    - Was improving in ICU, home  5, hydralazine, and diuretics. Yesterday am, CVP 3, SvO2 53, CO 5.38, CI 2.72, SVR 1160 with some kidney improvement (Cr 3.6->3.2). However, during the afternoon, the patient's urine output was only 235cc all day, and CVP increased from 3 to 7. Lasix 60mg IV and then 80mg were given. Overnight SVP 8, SvO2 51, CO 5.6, CI 2.9, , with a urine output of 517cc. Started on furosemide gtt. Of note, since admission I/Os -4.947 L, yesterday UOP 1.867L.   - Today am Hb 7.8 baseline ~8), Cr decreased from 3.4-3.6 to 3 after mentioned diuresis.   - HD today - Hb 7.8, MAP 82, CVP 5, SvO2 47, CO 5.1, CI 2.6, SVR 1211 (VO2 assumed as 3.5ml of O2/kg/min), on  5 and hydralazine, no vasopressor.     - Interventional cardiology consulted for advanced invasive mechanical support device/IABP placement.   - No plan for IABP at this moment but will reassess with HTS team            VTE Risk Mitigation (From admission, onward)           Ordered     heparin 25,000 units in dextrose 5% (100 units/ml) IV bolus from bag - ADDITIONAL PRN BOLUS - 60 units/kg  As needed (PRN)        Question:  Heparin Infusion Adjustment (DO NOT MODIFY ANSWER)  Answer:  \\[email or web address]\epic\Images\Pharmacy\HeparinInfusions\heparin LOW INTENSITY nomogram for [facility] [id number].pdf    11/11/23 0741     heparin 25,000 units in dextrose 5% (100 units/ml) IV bolus from bag - ADDITIONAL PRN BOLUS - 30 units/kg  As needed (PRN)        Question:  Heparin Infusion Adjustment (DO NOT MODIFY ANSWER)  Answer:  \\[email or web address]\epic\Images\Pharmacy\HeparinInfusions\heparin LOW INTENSITY nomogram for [facility] [id number].pdf    11/11/23 0741     heparin 25,000 units in dextrose 5% 250 mL (100 units/mL) infusion LOW INTENSITY nomogram - [facility]  Continuous        Question:  Begin  at (units/kg/hr)  Answer:  12    11/11/23 0741                    Thank you for your consult.     Clif Olmedo MD  Interventional Cardiology   [facility] - Cardiac Intensive Care

## 2023-11-16 NOTE — ASSESSMENT & PLAN NOTE
Pt with known ICM with an EF of 25% on home  presenting with decompensated HF.  Not in cardiogenic shock    [facility] 11/14: RA 14, PA 70/35, PCWP 32, CO 4.1, CI 2.1.     - Home  gtt at 5  - home GDMT: entresto 24/26mg BID (on hold 2/2 IVÁN), Hydralazine 25 q8h being continued  - home diuretics: Was taking lasix 40 bid  - CVP 5 this AM Contine  5. Continue Lasix gtt to keep net negative. Monitor CVP and sCr trend.  - CTS to follow regarding potential need for upgraded support to temporary MCS as bridge to advanced therapies.l   - Continue VAD/OHTx workup. On step 2.   -PT/OT and nutrition

## 2023-11-16 NOTE — ASSESSMENT & PLAN NOTE
Lab Results   Component Value Date    CREATININE 3.0 (H) 11/16/2023     Avoid insulin stacking  Titrate insulin slowly

## 2023-11-16 NOTE — PROGRESS NOTES
[facility] - Cardiac Intensive Care  Endocrinology  Progress Note    Admit Date: 11/9/2023     Reason for Consult: Management of T2DM, Hyperglycemia     Surgical Procedure and Date: n/a    Diabetes diagnosis year: 1998    Home Diabetes Medications:  Metformin (off since October)   Lab Results   Component Value Date    HGBA1C 7.6 (H) 10/12/2023       How often checking glucose at home?  Once daily in the AM    BG readings on regimen: 150-160s  Hypoglycemia on the regimen?  No  Missed doses on regimen?  n/a    Diabetes Complications include:     Hyperglycemia    Complicating diabetes co morbidities:   CAD s/p CABG, HTN, HLD      HPI:   Patient is a 63 y.o. male with a diagnosis of CAD s/p 3v CABG (unclear anatomy, 2009), ICM with a recent EF of 15-20% s/p ICD (medtronik 2009), DM2 (a1c 7.7), HTN, HLD, Vfib on amio who presents to the ED with CC of SOB. In the ED he was AF with stable VS on RA.  CBC showing chronic anemia.  CMP notable for acute on chronic CKD with baseline around 2.1 and Cr now 2.6.  BNP elevated.  Lactate neg.  CXR showing pulmonary edema. He was subsequently admitted to the CCU for diuresis.  Endocrinology consulted for management of T2DM.          Interval HPI:   No acute events overnight. Patient in room [id number] A. Blood glucose stable. BG at and above goal on current insulin regimen (SSI and prandial insulin). Steroid use- None .   3 Days Post-Op  Renal function- Abnormal - Cr 3.0   Vasopressors-  None     Diet NPO Except for: Medication     Eating:   NPO  Nausea: No  Hypoglycemia and intervention: No  Fever: No  TPN and/or TF: No    /69   Pulse 82   Temp 98.5 °F (36.9 °C) (Oral)   Resp (!) 21   Ht 6' (1.829 m)   Wt 77.5 kg (170 lb 13.7 oz)   SpO2 100%   BMI 23.17 kg/m²     Labs Reviewed and Include    Recent Labs   Lab 11/16/23  0436   *   CALCIUM 9.1   *   K 4.0   CO2 23   CL 97   BUN 63*   CREATININE 3.0*     Lab Results   Component Value Date    WBC 7.21  11/16/2023    HGB 7.8 (L) 11/16/2023    HCT 23.9 (L) 11/16/2023    MCV 89 11/16/2023     11/16/2023     Recent Labs   Lab 11/15/23  1720   TSH 1.381     Lab Results   Component Value Date    HGBA1C 7.6 (H) 10/12/2023       Nutritional status:   Body mass index is 23.17 kg/m².  Lab Results   Component Value Date    ALBUMIN 3.8 11/10/2023    ALBUMIN 3.7 11/09/2023    ALBUMIN 3.1 (L) 11/01/2023     Lab Results   Component Value Date    PREALBUMIN 27 11/15/2023       Estimated Creatinine Clearance: 28.3 mL/min (A) (based on SCr of 3 mg/dL (H)).    Accu-Checks  Recent Labs     11/13/23  1741 11/13/23  2206 11/14/23  0745 11/14/23  1201 11/14/23  1653 11/14/23  2123 11/15/23  0746 11/15/23  1207 11/15/23  1629 11/16/23  0755   POCTGLUCOSE 212* 181* 111* 205* 117* 253* 147* 214* 216* 173*       Current Medications and/or Treatments Impacting Glycemic Control  Immunotherapy:    Immunosuppressants       None          Steroids:   Hormones (From admission, onward)      None          Pressors:    Autonomic Drugs (From admission, onward)      None          Hyperglycemia/Diabetes Medications:   Antihyperglycemics (From admission, onward)      Start     Stop Route Frequency Ordered    11/13/23 1645  insulin aspart U-100 pen 7 Units         -- SubQ 3 times daily with meals 11/13/23 1444    11/10/23 1602  insulin aspart U-100 pen 0-5 Units         -- SubQ Before meals & nightly PRN 11/10/23 1503            ASSESSMENT and PLAN    Cardiac/Vascular  * Acute on chronic combined systolic and diastolic CHF, NYHA class 4  Managed per primary team  Optimize BG control      Renal/  IVÁN (acute kidney injury)  Lab Results   Component Value Date    CREATININE 3.0 (H) 11/16/2023     Avoid insulin stacking  Titrate insulin slowly       Endocrine  Type 2 diabetes mellitus without complication, without long-term current use of insulin  BG goal 140-180   T2DM.  BG at or above goal.  Will increase mealtime regimen.    Increase Novolog 9  units TID with meals   Low Dose Correction Scale (given kidney function)  BG monitoring ac/hs    ** Please call Endocrine for any BG related issues **    Discharge plans: TBD    Lab Results   Component Value Date    HGBA1C 7.6 (H) 10/12/2023               Albert Wilson PA-C  Endocrinology  [facility] - Cardiac Intensive [facility]

## 2023-11-16 NOTE — ASSESSMENT & PLAN NOTE
62yo M patient with history of CAD s/p 3v CABG (unclear anatomy, 2009), HFrEF ICM (EF 15-20%) s/p ICD (medtronic 2009), T2DM (Hba1c 7.7), HTN, HLD, Vfib (Amio), CKD (Cr 2.1), admitted to [facility] on 11/09/23    - Was improving in ICU, home  5, hydralazine, and diuretics. Yesterday am, CVP 3, SvO2 53, CO 5.38, CI 2.72, SVR 1160 with some kidney improvement (Cr 3.6->3.2). However, during the afternoon, the patient's urine output was only 235cc all day, and CVP increased from 3 to 7. Lasix 60mg IV and then 80mg were given. Overnight SVP 8, SvO2 51, CO 5.6, CI 2.9, , with a urine output of 517cc. Started on furosemide gtt. Of note, since admission I/Os -4.947 L, yesterday UOP 1.867L.   - Today am Hb 7.8 baseline ~8), Cr decreased from 3.4-3.6 to 3 after mentioned diuresis.   - HD today - Hb 7.8, MAP 82, CVP 5, SvO2 47, CO 5.1, CI 2.6, SVR 1211 (VO2 assumed as 3.5ml of O2/kg/min), on  5 and hydralazine, no vasopressor.     - Interventional cardiology consulted for advanced invasive mechanical support device/IABP placement.   - No plan for IABP at this moment but will reassess with HTS team

## 2023-11-16 NOTE — PLAN OF CARE
POC reviewed with Radha Abbott and family. Questions and concerns addressed. CVP: 3,0 and 7.  Svo2: 53 and 48. Plan for IABP tomorrow due to rising Creatinine and decreased urine output.  See below and flowsheets for full assessment and VS info.       Neuro:  [place] Coma Scale  Best Eye Response: 4-->(E4) spontaneous  Best Motor Response: 6-->(M6) obeys commands  Best Verbal Response: 5-->(V5) oriented  Michelle Coma Scale Score: 15  Assessment Qualifiers: patient not sedated/intubated  Pupil PERRLA: yes    24 hr Temp:  [96.6 °F (35.9 °C)-98.5 °F (36.9 °C)]     CV:  Rhythm: normal sinus rhythm   DVT prophylaxis: VTE Required Core Measure: Pharmacological prophylaxis initiated/maintained    CVP (mean): 8 mmHg (11/15/23 1505)       SVO2 (%): 49 % (11/13/23 0515)               Pulses  Right Radial Pulse: 2+ (normal)  Left Radial Pulse: 2+ (normal)  Right Dorsalis Pedis Pulse: 2+ (normal)  Left Dorsalis Pedis Pulse: 2+ (normal)  Right Posterior Tibial Pulse: 1+ (weak)  Left Posterior Tibial Pulse: 1+ (weak)    Resp:          GI/:  GI prophylaxis: yes  Diet/Nutrition Received: low saturated fat/low cholesterol, 2 gram sodium  Last Bowel Movement: 11/13/23  Voiding Characteristics: urethral catheter (bladder)       Urethral Catheter 11/13/23 1800 Straight-tip 16 Fr.-Reason for Continuing Urinary Catheterization: Critically ill in ICU and requiring hourly monitoring of intake/output   Intake/Output Summary (Last 24 hours) at 11/15/2023 1818  Last data filed at 11/15/2023 1805  Gross per 24 hour   Intake 1049.43 ml   Output 940 ml   Net 109.43 ml            Labs/Accuchecks:  Recent Labs   Lab 11/13/23  0504 11/14/23  0435 11/15/23  0401   WBC 4.24 4.20 7.02   RBC 2.59* 2.64* 2.77*   HGB 7.8* 7.8* 8.0*   HCT 23.5* 23.5* 24.9*    247 256      Recent Labs   Lab 11/11/23  0958 11/11/23  1600 11/14/23  0042 11/14/23  0435 11/15/23  0401 11/15/23  1601   INR 1.2  --   --   --   --  1.1   APTT 31.3   < > 58.1* 55.8* 49.4*   --     < > = values in this interval not displayed.      Recent Labs     11/15/23  1334   *   K 4.2   CO2 22*   CL 97   BUN 64*   CREATININE 3.4*       Recent Labs   Lab 11/09/23  0656   TROPONINI 0.053*      Recent Labs     11/13/23  1942 11/14/23 2035 11/15/23  0745 11/15/23  1552   PH 7.409 7.407  --  7.451*   PCO2 35.7 39.3  --  33.4*   PO2 22* 23* 26* 25*   HCO3 22.6* 24.7  --  23.3*   POCSATURATED 39 41 53 48   BE -2 0  --  -1       Electrolytes: No replacement orders  Accuchecks: ACHS    Gtts/LDAs:   sodium chloride 0.9%      DOBUTamine 5 mcg/kg/min (11/15/23 1805)    heparin (porcine) in D5W 16 Units/kg/hr (11/15/23 1805)       Lines/Drains/Airways       Central Venous Catheter Line  Duration             Percutaneous Central Line Insertion/Assessment - Triple Lumen  11/14/23 1630 1 day              Drain  Duration                  Urethral Catheter 11/13/23 1800 Straight-tip 16 Fr. 2 days              Peripheral Intravenous Line  Duration                  Midline Catheter Insertion/Assessment  - Single Lumen 11/14/23 1113 Left brachial vein 20g x 10cm 1 day                    Skin/Wounds  Bathing/Skin Care: bath, complete (11/15/23 1705)  Wounds: No  Wound care consulted: No

## 2023-11-16 NOTE — PROGRESS NOTES
Patient and wife, Arlyn have read this VAD education.  Spent an hour at bedside reviewing the contents of this in detail and all questions have been answered to patient and caregiver's satisfaction as evidence by verbal acknowledgement.     Verbal and written VAD Education:     Please carefully read and review the following statement, and, sign to indicate you have been fully informed about the candidacy and evaluation process for the mechanical circulatory support device (MCSD) also known as the Ventricular Assist Device (VAD).     Why a VAD Is Needed   Heart failure is defined as a condition in which your heart is unable to pump enough blood to support the basic needs of your body. This can make you feel tired, have abnormal rhythms, and shortness of breath. Heart failure can also lead to failure of other organs (e.g. liver or kidneys). You are being offered this treatment option because you have a marked increase risk of irreversible end-organ damage or death. For this reason, you are being considered for placement of a Left Ventricular Assist Device (VAD) at [facility]. The heart pump is designed to take over the pumping action of your heart.   Prior to undergoing this procedure, it is important that you and your family understand the options, benefits, risks, and expectations associated with having a VAD. It is required that you and your proposed caregiver(s) understand and agree with the treatment plan and are willing to participate in the guidelines outlined in the following pages.     Bridge to transplant (BTT) is when a VAD is used to help support heart function for someone waiting for a heart transplant. This treatment plan is subject to change pending the results from your evaluation and your physicians decision. If your medical condition worsens after you receive the VAD, you may not be a transplant candidate.   The information within this document pertains only to VAD therapy. You will  receive information regarding heart transplantation allocation, procedures, and risks from the Pre-Transplant when appropriate.   OR   Destination therapy is when a VAD is used as a long-term treatment for patients who are not candidates for transplant, such as those with end-stage congestive heart failure. In these patients, the pumps are placed permanently to help the heart work better. This is subject to change pending the results from your evaluation and your physicians decision.     Types of Ventricular Assist Devices:   There are several different types of mechanical circulatory support devices:   -Left ventricular assist devices (LVAD) help the left side of the heart pump blood to the largest artery of the body, the aorta.     Your VAD Team may also talk with you about:   -Right ventricular assist devices (RVAD) help the right side of the heart pump blood to the lungs.   -Total Artificial Heart (MAKAYLA) that replaces the heart and pumps blood to the body.   A VAD is a long-term device utilized by patients for months to years. Some patients may receive another device prior to receiving a VAD depending on their treatment and health status.   When Is a VAD Used?   When medications can no longer help, and other surgical options have been exhausted, a physician may recommend a VAD. A VAD is used to assist the pumping action of a severely weakened heart. It works with your heart to improve and to increase blood flow; it does not replace your own heart. VADs are most often used for patients experiencing New York Heart Association (NYHA) Class III-IV heart failure symptoms.     Alternative Options:   If you are not found to be a candidate for VAD implantation or if you decide that a VAD is not the best option for you, you will continue to receive customary standard of care. You may also continue optimal medical management alone including the use of inotrope therapy. An inotrope is an IV medication that helps the  strength of the hearts contraction. However, the reason that you are being considered for VAD implantation is because optimal medical management has not been adequate and without a VAD, your condition is likely to deteriorate over time. While going straight to transplant may be a possibility, death is a possibility as well.   You May Not Be Eligible To Receive A VAD If You Are Found To Have Any Of The Following:      Any irreversible non-cardiac disease state with less than 2-year survival rate    Inadequate social support to be successful at home after surgery    Irreversible pulmonary disease or fixed pulmonary hypertension    Irreversible renal disease    Irreversible liver disease    Unresolved stroke or uncorrected cerebral vascular disease    A history of chronic noncompliance    Using illicit drugs or alcohol    Uncorrected thyroid disease    Significant right ventricular failure    Obstructive or restrictive cardiomyopathy    Amyloidosis    Active pericardial disease    Untreated aortic aneurysm    Irreversible cognitive dysfunction    History of psychiatric disease, uncontrolled affective disorder, or any cognitive dysfunction that may prevent you from managing self-care    Diabetes with severe retinopathy or peripheral neuropathy    Obesity with BMI (body mass index) greater than 35    Severe chronic malnutrition    Uncorrected blood disorders    Active uncontrolled infection    Pregnancy (positive pregnancy test)    HIV positive or immunosuppression that could result in device infection    Muscular dystrophy, MS or similar disease states    Active systemic infection    Cancer    Insufficient funding     Possible Benefits:   The overall goal is improved health and quality of life. In most cases, because circulation has been restored as a result of the VAD, you can expect to have more energy and also experience less heart failure symptoms. Since VADs help deliver more oxygen rich blood, you may feel well  enough to resume many of the usual activities and hobbies that you enjoy. In fact, many patients return to work and are no longer disabled, depending on the type of work they do. Be aware that you may lose your disability post VAD based on review of your records and disability benefits. It is important that you speak with a  so that you may plan for this should it occur.   The improved circulation may prolong life and may improve some organ damage caused by your heart failure. This is supported by some studies that have shown that VAD patients have a longer survival than patients treated with medications alone. Although the VAD can improve your chances for survival, the type and severity of your heart disease may outweigh any benefits from the device and you may still die.     Possible Surgical/ Anesthesia Risks:   As with any surgery or procedure, there are risks and the possibility of complications or death. There are also risks related to the operation itself and undergoing anesthesia, and risks related to the device itself. You will discuss the risks in detail with the Cardiac Surgeon who intends to perform your surgery. Your surgeon and anesthesia provider will review consent forms prior to surgery.     Operative Procedure Risks:   There are many risks with this operation including but not limited to: death, heart attack, stroke, nerve injury, blood clots, damage to arteries needing limb amputation, bleeding and hemorrhage, hemolysis, infection, development of new antibodies in your blood, mediastinitis, arrhythmia, right heart failure, heart block, or the need for pacemaker or ICD implantation. The need for re-operation for any reason may cause: renal, hepatic or pulmonary failure resulting in death or long-term need of ventilation or dialysis, and blood transfusion with its risk of HIV, and hepatitis. Studies have also shown that patients may have problems with memory, attention, and  speed of processing thoughts after a cardiac surgical procedure. Any of these complications will be explained to you in more detail if you desire. In addition to these potential complications, there may be other risks that are currently unknown.     Risks Related to VAD Therapy:   Include but not limited to: death, need for re-operation, device malfunction or device infection, renal failure requiring dialysis, blood clots, stroke, pain, or bleeding. These risks may lead to prolonged hospital stay or re-hospitalization. Pump exchange due to complications is a possibility. Patients may also experience a potential decrease in their quality of life including limitations of their normal activities. Patients may reach a point where quality of life is so impaired, that the decision to terminate VAD-support will need to be addressed. The longer you are on the device, the greater the chances that complications will develop.   Please see addendum for likelihood of these risks impacting you in your VAD therapy journey.     Evaluation Process:   There will be many people involved in the evaluation process to assure that this is the best choice for you. You will receive a number of tests and consultations. Some of the people that may help evaluate you include Heart Failure Physicians, Cardiac Surgeons, Social Workers and VAD Coordinators. During the evaluation process, you will be given education about the VAD and the care you would require. After the evaluation, the group will decide if you meet the criteria to have a VAD implanted. You may require or have already been implanted with a short-term VAD prior to surgery for a long-term VAD.     Care Team   Additionally, you will meet with a number of different team members to coordinate your care: financial counselor to discuss insurance and dressing supplies, research coordinators, anesthesiologist, psychiatrist/psychologist, physical therapist or occupational therapist to  discuss rehabilitation after VAD, and dietician to improve nutrition. Some patients may be referred to other services for consultation. These may include, but are not limited to: infectious disease doctor, gastroenterologist, nephrologist (kidney doctor), pulmonologist (lung doctor), hepatologist (liver doctor), or an ophthalmologist (eye doctor). It is recommended that you see your dentist to ensure no infection is present and all needed dental work is completed before surgery. Cavities and rotten teeth can cause an infection which can lead to death.   Testing is required to determine VAD candidacy. These include but are not limited to the following:   Laboratory studies of blood and urine, chest x-ray, abdominal ultrasound, CT scan, echocardiogram, cardiopulmonary treadmill, right heart catheterization, electrocardiogram, pulmonary function tests, colonoscopy or endoscopy, vascular studies, and pulmonary studies.     Device Choice:   VADs are currently approved by the Food and Drug Administration (FDA) to be used as Bridge to Transplantation (BTT) or Destination Therapy (DT). A full list will be provided for you and your family to review if desired. This [facility] also participates in clinical trials with devices that are considered investigational, and are not yet FDA approved for BTT/DT. Your Surgeon and Cardiologist will discuss with you which device is the best option for you. VADs have four main parts: the implantable heart pump, a tube that passes through the skin of your abdomen (driveline), a controller (small computer) that controls the pump operation, and an external power source (batteries or power device). In addition, there are other VADs that are used temporarily when patients are in cardiogenic shock.   You have the right to refuse surgery at any time. This educational document consent will help you to make an informed decision about your VAD option. If you decide this is not the best option for  you , please make your physician aware. You and your family make the decision to proceed.   Pre-operative Care expectations:    Your surgeon will request a tentative operative date in your name in the event that VAD therapy is the right option defined by you, your family, and provider team. [facility]    Informed surgical and anesthesia consents will be completed prior to the procedure.    You will be admitted to the hospital a few days before the day of surgery for optimization before surgery (unless already hospitalized).    Intra-aortic balloon pump or impella will be placed before surgery    If you are listed for transplant, once you go for the VAD your listing status will change. This will be discussed with you by your Transplant team.    If you have an ICD (defibrillator), it will be turned off prior to surgery, and then turned back on after surgery. It will NOT be removed.     Surgical Procedure:   The surgical procedure to implant the VAD will require open-heart surgery and can take on average between 6-12 hours. The surgeon will need to make an incision down the front of your chest to reach your heart. You will have a breathing tube and ventilator while under general anesthesia. The VAD is placed below the heart and the surgeon will connect the pump to your heart and secure it in place with sutures. Once the pump is in place, the VAD along with your natural heart will resume pumping blood through your body. After the surgery is completed, you will receive care in the ICU.     Post-Operative Care Expectations:   Upon arrival to the ICU, you will receive close monitoring and support from the following medical mechanisms:    Upon arrival to ICU, please adhere to our ICU (intensive care unit) visiting hours. You will need to rest and we ask that family not stay the night for a few days.    Heart monitor (telemetry) to monitor heart rate and rhythm.    A breathing tube (endotracheal  tube) and ventilator to assist with breathing and maintain and open airway.    An oral-gastric tube will be utilized to keep the stomach empty when connected to suction, as well as to give the nursing staff the capability to administer oral medications directly into the stomach.    A Hampton catheter to measure urinary output.    A Durham-Siva Catheter to measure pressures within the heart and lungs.    An arterial line catheter in order to measure arterial blood pressure.    Chest tubes to collect and measure drainage from surgery.    A VAD driveline that exits the skin in the abdominal area and is connected to the VAD power source.    Your chest may be left open for 24 to 72 hours after implantation, after which you will be taken back to the operating room to close your chest.     You will receive medications for sedation and to control your pain in order to achieve a tolerable level of comfort. You will also be on IV medications until your blood pressure and fluid status are stable. Your home medications will be resumed as soon as possible if still medically relevant. In addition to your previous taken medications, patients with VADs are commonly prescribed medications for anticoagulation/anti-platelet, antibiotics, blood pressure, and vitamin/mineral supplements. Your length of stay in the ICU will depend on how fast you recover. Once you are more stable, breathing on your own with your lines and tubes out, you will be transferred to a general care unit where you can expect to stay for another 1-3 weeks. On average, your total length of stay will be about three or four weeks after your surgery. During this time, it is expected that you and your family will begin to learn to manage   the device and learn how to manage your care at home. Most patients are able to return home after VAD implantation, but this cannot be guaranteed. Complications may require a prolonged period of hospitalization, long term acute care  facility, or inpatient rehabilitation. Be aware, if you are unattended and the device fails, you may not be able to perform the emergency procedures yourself, which could result in death and/or blood clots in the device.     Education:   Verbal, written, and visual educational materials are provided throughout your hospitalization and are available to anyone involved in your care at home. You and your caregiver(s) will be trained by a VAD Coordinator on how to manage your care and device. Other staff such as your bedside Nurse, the Occupational and Physical Therapists will also provide training to you. You and your caregiver(s) must show ability to manage the device, understand how it operates, troubleshoot problems, and care for your driveline exit site. It is expected that a caregiver(s) will be present and available while you are in the hospital to learn how to manage the device and how to care for you when you are at home. The education will be an ongoing process while you are here at the hospital. Prior to your expected discharge, your family and/or caregivers will be required to show competency in the care and management of you and your VAD. In addition, a VAD Coordinator will ensure that your local fire department, emergency personnel, and any other community members will be given education materials and training as necessary. Your home must have consistent electricity and phone services; the outlets must be three pronged and grounded. Any additional safety needs are arranged during this time. You will need to have your glasses and hearing aids at the hospital in order to be educated, as soon as possible.     Caregiver Requirements:   VAD implantation is based on suitability, need, and a committed caregiver. The caregiver must agree to certain responsibilities for the VAD implant process to continue. This is not negotiable. The VAD patient may be completely dependent on the caregiver. While the patient is in  the hospital, the caregiver is expected to visit daily, preferably at a time between 8 am- 4 pm. The caregiver will need to learn the dressing change process by the nurses and consistently demonstrate the ability to perform VAD dressing change in addition to helping the patient learn about the VAD. If the patient is unable to meet education goals before discharge, they may need 24-hour care. 24-hour caregivers may experience increased stress in their day-to-day life resulting from caring for a loved one with a VAD. There are support groups available to help you through living with a VAD.   The patient will not be able to drive for several months. The caregiver will need to drive the patient to appointments, as frequently as two to three times weekly at first. The caregiver will need to assist the patient with medication management. The caregiver will need to encourage the patient to document VAD numbers daily and know when to call for a problem.   Education begins now and is on-going throughout the VAD patients life.   The VAD team recognizes that VAD patients have an increased satisfactory outcome with a dedicated and committed caregiver. The caregiver has a tremendous responsibility. It is essential for you, the caregiver, to understand what is expected prior to moving forward. Any concerns about being the caregiver should be discussed with the , the VAD coordinator, or the physician.     Discharge Process:   Your daily progress will be followed by a team of people involved in your care including your Surgeon, Cardiologist, VAD Coordinators, Staff Nurses, Nurse Practitioners, Physician Assistants, Physical/Occupational Therapy, Social Workers, and a Discharge Planner. They will monitor your recovery and help you to adjust to life with a VAD. You will need to demonstrate the ability to care for yourself, such as grooming and exercise. You will also need to demonstrate the ability to care for your VAD,  the equipment, and alarms. You must have a thermometer, weight scale, flashlight, and a blood pressure cuff at your home. Most patients return home however, some patients choose to live with a caregiver or need a rehabilitation facility for a short period before returning home. If insurance allows, a Home Health nurse will be recommended to come to your home and assist you in your care when you return home. The length of time that the Visiting Nurse will come to your home will depend on your overall recovery. It is recommended  after you return home that you enroll in a Cardiac Rehab program to continue to improve your physical health.     Follow up care:   After you are discharged, you will follow up with your Surgeon, your Heart Failure Cardiologist and your VAD coordinator. They will collaboratively care for you and make decisions about your treatment. Typically, your first visit will be 1 week after discharge. We will see you every week for 3-4 weeks, followed by every 2 weeks for 1 month, then monthly thereafter while you have the VAD. Once you are considered a stable established patient, your Physicians may decide that you can follow-up every 2-3 months. Along with seeing your Cardiologist and Surgeon, you will have laboratory testing, and other physiological testing done on a regular basis in order to monitor and maintain your progress and health. The types of testing that you may need and the frequency will be decided by your Physicians but can include blood tests, EKG, Echocardiogram, Right Heart Catheterization, V02 Treadmill Stress Test, and Implantable Cardioverter Defibrillator (ICD) device check. If you have received an investigational VAD, you will have other testing that will be required for the research study. You will be required to take anticoagulation medications, also known as blood thinning medications (coumadin, warfarin). You will   be required to have frequent blood draws, up to 2-3 times  a week, in order to monitor your blood count and blood thinning agents. You will also be in frequent contact with a VAD Coordinator who will make phone calls to assess how you are doing at home and assist you with any problems that may arise. A VAD Coordinator and a Heart Failure Cardiologist are also available 24 hours a day in the event that you have an emergency. On average, you can expect that within 12 weeks after surgery, you will be able to return to most activities, with the permission of your VAD Team.     Lifestyle Changes and Prohibited Activities:   You will have limitations and can resume most usual activities. Certain activities are hazardous or fatal after implant. Persons with implantable VADs must not allow their controller/computer and electrical equipment to submerge in water. Showering is possible with proper protective equipment. You may only resume showering once your driveline has healed and your VAD coordinator gives their permission. Swimming and baths are prohibited. You cannot sleep on your stomach or the side the driveline is exiting your abdomen. Contact sports, repetitive jumping, or impact with an airbag are examples of activities that may cause trauma to the pump attachments and must be avoided.   You may be sexually active but must care for your driveline. Female patients cannot get pregnant. Medical care after implant includes lifetime follow up to monitor device function and health status. You may not have a magnetic resonance imaging (MRI) test because of the magnetic fields. You may not vacuum, touch television or computer screens, or take hot clothes out of the dryer due to the static electricity. VAD therapy requires significant self-care responsibility and a willingness to participate with you VAD team. Driveline exit site dressings must be performed daily using sterile technique outlined in your care of driveline documentation or as directed by your VAD team. Care of your  driveline must be done daily by yourself or caregiver. Maintenance care of the device components, batteries, and driveline is necessary to prevent pump failure, infections, or other serious complications. You will continue to take medications for your heart failure although the dose and or medication name may change. You may continue to take your diuretic such as lasix or demedex. You may also continue to be on a fluid restriction. You may drive once approved by your VAD team. You may be able to travel with your VAD, depending on the situation.     VAD Equipment:   Along with the device that is implanted inside your body, you will have a number of other external pieces of equipment that will require care and maintenance. You will have a driveline that exits your body through your abdomen that will power a controller, which is the computer component that tells the heart pump how to perform. The controller will also tell you about alarms, sounds, and words, on how your pump is operating and if there are any problems. In order to power the device and the controller, you will have batteries and a battery charger and/or power device. The batteries allow you to be mobile and move freely without being attached to outlet power. The  or power device allows   you to be connected to power for long periods of time such as when you are sleeping. Different VADs have similar pieces of equipment, but will vary depending on the device you receive. You will receive education and teaching before you leave the hospital to make sure you understand clearly how to operate the equipment and troubleshoot problems that may occur.     Confidentiality:   Hospital personnel who are involved in the course of your care may review your medical record. Communication between you and [facility] remains confidential. If you do become a candidate for transplant, data about your case, which will include your identity, will be sent to [place]  [employer] ([employer]) and may be sent to other places involved in the transplant process as permitted by law. Data about your VAD, which will include your identity, will be shared with the  of the device. Device  may be involved in your care alongside your VAD Team in the hospital and/or clinic setting. Your information may also be entered into a registry for pump implants, known as INTERMACS. Your participation in this is voluntary, and will be discussed at greater length prior to implant.     End of Life:   If you are approaching the end of life, the VAD can be turned off. You may be in a hospital, home, or hospice setting. If you become very sick and do not have a chance to survive, we may talk about stopping the pump. The doctor would talk to you or your family about what is right for you. It is helpful to talk to your family about your goals before surgery so they know what your wishes are. A member of our Goals of Care team will visit you before surgery to help you learn your goals. You have the right to have the device turned off or to decline pump exchange if your pump fails or malfunctions.     Device Return:   At the time of either transplant or death, the surgeon may need to remove the device to return to the . You or a family member may need to sign a separate consent for removal of the device.   Questions   We encourage you to learn everything you can about the potential benefits and risks of having a VAD. If you or your family has any questions, you should feel free to contact your Transplant Coordinator or VAD Coordinator.   By signing this form, you understand and have reviewed the implant procedure as well as the potential benefits and risks involved with the getting a VAD. You also acknowledge and understand the care that will be required to maintain this device and yourself, including changes in your lifestyle, and impact on your  independence.

## 2023-11-16 NOTE — ASSESSMENT & PLAN NOTE
BG goal 140-180   T2DM.  BG at or above goal.  Will increase mealtime regimen.    Increase Novolog 9 units TID with meals   Low Dose Correction Scale (given kidney function)  BG monitoring ac/hs    ** Please call Endocrine for any BG related issues **    Discharge plans: TBD    Lab Results   Component Value Date    HGBA1C 7.6 (H) 10/12/2023

## 2023-11-16 NOTE — SUBJECTIVE & OBJECTIVE
Past Medical History:   Diagnosis Date    CAD (coronary artery disease)     Diabetes mellitus     HFrEF (heart failure with reduced ejection fraction)     ICD (implantable cardioverter-defibrillator) in place     MI, old        Past Surgical History:   Procedure Laterality Date    CARDIAC SURGERY      RIGHT HEART CATHETERIZATION Right 10/10/2023    Procedure: INSERTION, CATHETER, RIGHT HEART;  Surgeon: Bin Gandhi MD;  Location: [facility] CATH LAB;  Service: Cardiology;  Laterality: Right;    RIGHT HEART CATHETERIZATION Right 10/13/2023    Procedure: INSERTION, CATHETER, RIGHT HEART;  Surgeon: Walter Mcintyre MD;  Location: [facility] CATH LAB;  Service: Cardiology;  Laterality: Right;    RIGHT HEART CATHETERIZATION  11/13/2023    RIGHT HEART CATHETERIZATION Right 11/13/2023    Procedure: INSERTION, CATHETER, RIGHT HEART;  Surgeon: Juventino Bermudez Jr., MD;  Location: [facility] CATH LAB;  Service: Cardiology;  Laterality: Right;       Review of patient's allergies indicates:  No Known Allergies    PTA Medications   Medication Sig    amiodarone (PACERONE) 400 MG tablet Take 1 tablet (400 mg total) by mouth once daily.    apixaban (ELIQUIS) 5 mg Tab Take 1 tablet (5 mg total) by mouth 2 (two) times daily.    aspirin (ECOTRIN) 81 MG EC tablet Take 81 mg by mouth once daily.    atorvastatin (LIPITOR) 40 MG tablet Take 40 mg by mouth.    DOBUTamine (DOBUTREX) 1,000 mg/250 mL (4,000 mcg/mL) infusion Inject 389.5 mcg/min into the vein continuous.    fish oil-omega-3 fatty acids 300-1,000 mg capsule Take 2 g by mouth once daily.    furosemide (LASIX) 40 MG tablet Take 1 tablet (40 mg total) by mouth 2 (two) times daily.    gabapentin (NEURONTIN) 300 MG capsule Take 300 mg by mouth nightly as needed.    multivitamin capsule Take 1 capsule by mouth once daily.    pantoprazole (PROTONIX) 40 MG tablet Take 1 tablet (40 mg total) by mouth before breakfast.    potassium chloride SA (K-DUR,KLOR-CON) 20 MEQ tablet Take 1 tablet (20 mEq  total) by mouth once daily.    sacubitriL-valsartan (ENTRESTO) 24-26 mg per tablet Take 1 tablet by mouth 2 (two) times daily.    metOLazone (ZAROXOLYN) 2.5 MG tablet Take 1 tablet (2.5 mg total) by mouth once daily. Thursday 11/9 and Saturday 11/10    torsemide (DEMADEX) 20 MG Tab Take 2 tablets (40 mg total) by mouth 2 (two) times a day.     Family History    None       Tobacco Use    Smoking status: Every Day     Current packs/day: 0.50     Types: Cigarettes    Smokeless tobacco: Not on file   Substance and Sexual Activity    Alcohol use: Yes     Comment: rarely    Drug use: No    Sexual activity: Not on file     Review of Systems   Constitutional: Negative.   HENT: Negative.     Cardiovascular:  Negative for chest pain, claudication, cyanosis, dyspnea on exertion, irregular heartbeat, leg swelling, near-syncope, orthopnea, palpitations and paroxysmal nocturnal dyspnea.   Respiratory: Negative.     Endocrine: Negative.    Musculoskeletal: Negative.    Gastrointestinal: Negative.    Genitourinary: Negative.    Neurological: Negative.      Objective:     Vital Signs (Most Recent):  Temp: 98.5 °F (36.9 °C) (11/15/23 1505)  Pulse: 82 (11/16/23 0806)  Resp: (!) 21 (11/16/23 0806)  BP: 114/69 (11/16/23 0806)  SpO2: 100 % (11/16/23 0806) Vital Signs (24h Range):  Temp:  [96.6 °F (35.9 °C)-98.5 °F (36.9 °C)] 98.5 °F (36.9 °C)  Pulse:  [82-94] 82  Resp:  [16-28] 21  SpO2:  [97 %-100 %] 100 %  BP: (100-122)/(53-83) 114/69     Weight: 77.5 kg (170 lb 13.7 oz)  Body mass index is 23.17 kg/m².    SpO2: 100 %         Intake/Output Summary (Last 24 hours) at 11/16/2023 0905  Last data filed at 11/16/2023 0701  Gross per 24 hour   Intake 1010.92 ml   Output 1837 ml   Net -826.08 ml       Lines/Drains/Airways       Central Venous Catheter Line  Duration             Percutaneous Central Line Insertion/Assessment - Triple Lumen  11/14/23 1630 1 day              Drain  Duration                  Urethral Catheter 11/13/23 1800  Straight-tip 16 Fr. 2 days              Peripheral Intravenous Line  Duration                  Midline Catheter Insertion/Assessment  - Single Lumen 11/14/23 1113 Left brachial vein 20g x 10cm 1 day                     Physical Exam  Vitals and nursing note reviewed.   Constitutional:       General: He is not in acute distress.     Appearance: Normal appearance. He is normal weight. He is not ill-appearing or diaphoretic.   HENT:      Head: Normocephalic and atraumatic.   Eyes:      Pupils: Pupils are equal, round, and reactive to light.   Cardiovascular:      Rate and Rhythm: Normal rate and regular rhythm.      Pulses: Normal pulses.      Heart sounds: Normal heart sounds.      Comments: Chest central healed scar  Pulmonary:      Effort: Pulmonary effort is normal.      Breath sounds: Normal breath sounds.   Abdominal:      General: Abdomen is flat. Bowel sounds are normal. There is no distension.      Palpations: Abdomen is soft. There is no mass.      Tenderness: There is no abdominal tenderness.   Musculoskeletal:         General: Normal range of motion.   Skin:     General: Skin is warm.      Capillary Refill: Capillary refill takes less than 2 seconds.   Neurological:      General: No focal deficit present.      Mental Status: He is alert and oriented to person, place, and time.            Significant Labs:     Recent Labs   Lab 11/14/23  0435 11/15/23  0401 11/16/23  0436   WBC 4.20 7.02 7.21   HGB 7.8* 8.0* 7.8*   HCT 23.5* 24.9* 23.9*    256 257       Recent Labs   Lab 11/14/23  0435 11/14/23  1836 11/15/23  0401 11/15/23  1334 11/16/23  0436      < > 134* 133* 133*   K 4.2   < > 3.8 4.2 4.0      < > 97 97 97   CO2 23   < > 23 22* 23   BUN 62*   < > 60* 64* 63*   CREATININE 3.1*   < > 3.2* 3.4* 3.0*   CALCIUM 9.1   < > 9.4 9.2 9.1   PHOS 4.9*  --  5.5*  --  5.2*    < > = values in this interval not displayed.       Recent Labs   Lab 11/10/23  1816 11/15/23  1720   ALKPHOS 89  --   "  BILITOT 1.0  --    BILIDIR  --  0.4*   PROT 7.3  --    ALT 37  --    AST 19  --        Recent Labs   Lab 11/15/23  1720   CHOL 107*   TRIG 74   HDL 49       No results for input(s): "CKTOTAL", "CPK", "TROPONINI", "TROPONINT", "TROPTSTAT", "CPKMB", "MB" in the last 168 hours.    Significant Imaging:     RHC 11/13/23  Study performed on  5 mcg/kg/min:     Right atrial pressure is moderately elevated.     Pulmonary artery pressure is moderately elevated.     Pulmonary wedge pressure is moderately severely elevated.     Pulmonary vascular resistance is mildly elevated 3.7 Wood units.     Systemic vascular resistance is 1327.     Reduced cardiac power output 0.38.     Normal CHINYERE 2.50.     RA: 19/ 16/ 14 RV: 65/ 20 PA: 70/ 35/ 47 PWP: 32/ 45/ 32 .   Cardiac output was 4.1 by Ana. Cardiac index is 2.1 L/min/m2.   O2 Sat: PA 32%.   Pulmonary vascular resistance: 293. Systemic vascular resistance: 1327.     TTE 10/19/23     Left Ventricle: The left ventricle is severely dilated. Moderately increased ventricular mass. Normal wall thickness. There is moderate eccentric hypertrophy. regional wall motion abnormalities present. There is severely reduced systolic function with a visually estimated ejection fraction of 15 - 20%. Biplane (2D) method of discs ejection fraction is 18%. There is diastolic dysfunction.    Left Atrium: Left atrium is severely dilated.    Right Ventricle: Normal right ventricular cavity size. Systolic function is normal.    Aortic Valve: There is mild aortic valve sclerosis.    Mitral Valve: There is mild to moderate regurgitation.    IVC/SVC: Intermediate venous pressure at 8 mmHg.          "

## 2023-11-16 NOTE — ASSESSMENT & PLAN NOTE
-A1c 7.6, not insulin dependent  - MDSSI  -diabetic diet ordered/Boost glucose control   - Consider endocrine if uncontrolled

## 2023-11-16 NOTE — PLAN OF CARE
Problem: Adult Inpatient Plan of Care  Goal: Plan of Care Review  Outcome: Ongoing, Not Progressing  Flowsheets (Taken 11/16/2023 1740)  Plan of Care Reviewed With: patient  Goal: Patient-Specific Goal (Individualized)  Outcome: Ongoing, Not Progressing  Goal: Absence of Hospital-Acquired Illness or Injury  Outcome: Ongoing, Not Progressing  Goal: Optimal Comfort and Wellbeing  Outcome: Ongoing, Not Progressing  Cardiac ICU Care Plan    POC reviewed with Ledric Abbott and family. Questions and concerns addressed. See below and flowsheets for full assessment and VS info.      CVP  5 5   Svo2 47         Neuro:  Michelle Coma Scale  Best Eye Response: 4-->(E4) spontaneous  Best Motor Response: 6-->(M6) obeys commands  Best Verbal Response: 5-->(V5) oriented  Ramona Coma Scale Score: 15  Assessment Qualifiers: patient not sedated/intubated  Pupil PERRLA: yes    24 hr Temp:  [97.4 °F (36.3 °C)-98.7 °F (37.1 °C)]      CV:  Rhythm: normal sinus rhythm   DVT prophylaxis: VTE Required Core Measure: Pharmacological prophylaxis initiated/maintained    CVP (mean): 5 mmHg (11/16/23 1101)       SVO2 (%): 49 % (11/13/23 0515)               Pulses  Right Radial Pulse: 2+ (normal)  Left Radial Pulse: 2+ (normal)  Right Dorsalis Pedis Pulse: 2+ (normal)  Left Dorsalis Pedis Pulse: 2+ (normal)  Right Posterior Tibial Pulse: 1+ (weak)  Left Posterior Tibial Pulse: 1+ (weak)    Resp:          GI/:  GI prophylaxis: no  Diet/Nutrition Received: consistent carb/diabetic diet, restrict fluids, 2 gram sodium  Last Bowel Movement: 11/16/23  Voiding Characteristics: urethral catheter (bladder)       Urethral Catheter 11/13/23 1800 Straight-tip 16 Fr.-Reason for Continuing Urinary Catheterization: Critically ill in ICU and requiring hourly monitoring of intake/output   Intake/Output Summary (Last 24 hours) at 11/16/2023 1741  Last data filed at 11/16/2023 1101  Gross per 24 hour   Intake 341.44 ml   Output 1862 ml   Net -1520.56 ml       "  Nutritional Supplement Intake: Quantity 1, Type: Boost    Labs/Accuchecks:  Recent Labs   Lab 11/14/23  0435 11/15/23  0401 11/16/23  0436   WBC 4.20 7.02 7.21   RBC 2.64* 2.77* 2.70*   HGB 7.8* 8.0* 7.8*   HCT 23.5* 24.9* 23.9*    256 257      Recent Labs   Lab 11/11/23  0958 11/11/23  1600 11/14/23  0435 11/15/23  0401 11/15/23  1601 11/16/23 0436   INR 1.2  --   --   --  1.1  --    APTT 31.3   < > 55.8* 49.4*  --  56.0*    < > = values in this interval not displayed.      Recent Labs     11/16/23  1340   *   K 4.3   CO2 22*   CL 98   BUN 64*   CREATININE 3.3*     No results for input(s): "CPK", "CPKMB", "MB", "TROPONINI" in the last 168 hours.   Recent Labs     11/14/23  2035 11/15/23  0745 11/15/23  1552 11/15/23  2006 11/16/23  0806   PH 7.407  --  7.451* 7.451*  7.451*  --    PCO2 39.3  --  33.4* 33.0*  33.0*  --    PO2 23*   < > 25* 26*  26* 25*   HCO3 24.7  --  23.3* 23.0*  23.0*  --    POCSATURATED 41   < > 48 51  51 47   BE 0  --  -1 -1  -1  --     < > = values in this interval not displayed.       Electrolytes: N/A - electrolytes WDL  Accuchecks: ACHS    Gtts/LDAs:   sodium chloride 0.9%      DOBUTamine 5 mcg/kg/min (11/16/23 1331)    furosemide (LASIX) 500 mg in 50 mL infusion (conc: 10 mg/mL) 5 mg/hr (11/16/23 1446)    heparin (porcine) in D5W 16 Units/kg/hr (11/16/23 1101)       Lines/Drains/Airways       Central Venous Catheter Line  Duration             Percutaneous Central Line Insertion/Assessment - Triple Lumen  11/14/23 1630 2 days              Drain  Duration                  Urethral Catheter 11/13/23 1800 Straight-tip 16 Fr. 2 days              Peripheral Intravenous Line  Duration                  Midline Catheter Insertion/Assessment  - Single Lumen 11/14/23 1113 Left brachial vein 20g x 10cm 2 days                    Skin/Wounds  Bathing/Skin Care: linen changed;bath, complete (11/16/23 1101)  Wounds: No  Wound care consulted: No      "

## 2023-11-17 PROBLEM — Z51.5 PALLIATIVE CARE ENCOUNTER: Status: ACTIVE | Noted: 2023-11-17

## 2023-11-17 PROBLEM — Z71.89 ADVANCE CARE PLANNING: Status: ACTIVE | Noted: 2023-11-17

## 2023-11-17 PROBLEM — Z01.818 PRE-TRANSPLANT EVALUATION FOR HEART TRANSPLANT: Status: ACTIVE | Noted: 2023-11-17

## 2023-11-17 LAB
ALLENS TEST: ABNORMAL
ANION GAP SERPL CALC-SCNC: 11 MMOL/L (ref 8–16)
ANION GAP SERPL CALC-SCNC: 13 MMOL/L (ref 8–16)
ANION GAP SERPL CALC-SCNC: 15 MMOL/L (ref 8–16)
APTT PPP: 58.1 SEC (ref 21–32)
AT III AG ACT/NOR PPP IA: 112 % (ref 80–120)
BASOPHILS # BLD AUTO: 0.03 K/UL (ref 0–0.2)
BASOPHILS NFR BLD: 0.5 % (ref 0–1.9)
BIPAP: 0
BUN SERPL-MCNC: 67 MG/DL (ref 8–23)
BUN SERPL-MCNC: 68 MG/DL (ref 8–23)
BUN SERPL-MCNC: 77 MG/DL (ref 8–23)
CALCIUM SERPL-MCNC: 8.7 MG/DL (ref 8.7–10.5)
CALCIUM SERPL-MCNC: 8.9 MG/DL (ref 8.7–10.5)
CALCIUM SERPL-MCNC: 8.9 MG/DL (ref 8.7–10.5)
CHLORIDE SERPL-SCNC: 96 MMOL/L (ref 95–110)
CHLORIDE SERPL-SCNC: 97 MMOL/L (ref 95–110)
CHLORIDE SERPL-SCNC: 98 MMOL/L (ref 95–110)
CMV IGG SERPL QL IA: REACTIVE
CO2 SERPL-SCNC: 20 MMOL/L (ref 23–29)
CO2 SERPL-SCNC: 22 MMOL/L (ref 23–29)
CO2 SERPL-SCNC: 23 MMOL/L (ref 23–29)
COTININE SERPL-MCNC: <3 NG/ML
CREAT SERPL-MCNC: 3 MG/DL (ref 0.5–1.4)
CREAT SERPL-MCNC: 3.2 MG/DL (ref 0.5–1.4)
CREAT SERPL-MCNC: 3.2 MG/DL (ref 0.5–1.4)
DELSYS: ABNORMAL
DELSYS: ABNORMAL
DIFFERENTIAL METHOD BLD: ABNORMAL
EOSINOPHIL # BLD AUTO: 0.1 K/UL (ref 0–0.5)
EOSINOPHIL NFR BLD: 0.8 % (ref 0–8)
ERYTHROCYTE [DISTWIDTH] IN BLOOD BY AUTOMATED COUNT: 14.5 % (ref 11.5–14.5)
EST. GFR  (NO RACE VARIABLE): 21.2 ML/MIN/1.73 M^2
EST. GFR  (NO RACE VARIABLE): 21.2 ML/MIN/1.73 M^2
EST. GFR  (NO RACE VARIABLE): 22.9 ML/MIN/1.73 M^2
FIO2: 36 %
G6PD RBC-CCNT: 9.2 U/G HB (ref 8–11.9)
GLUCOSE SERPL-MCNC: 135 MG/DL (ref 70–110)
GLUCOSE SERPL-MCNC: 215 MG/DL (ref 70–110)
GLUCOSE SERPL-MCNC: 216 MG/DL (ref 70–110)
HCO3 UR-SCNC: 23.2 MMOL/L (ref 24–28)
HCO3 UR-SCNC: 23.7 MMOL/L (ref 24–28)
HCO3 UR-SCNC: 24 MMOL/L (ref 24–28)
HCT VFR BLD AUTO: 22.7 % (ref 40–54)
HGB BLD-MCNC: 7.4 G/DL (ref 14–18)
IMM GRANULOCYTES # BLD AUTO: 0.02 K/UL (ref 0–0.04)
IMM GRANULOCYTES NFR BLD AUTO: 0.3 % (ref 0–0.5)
LACTATE SERPL-SCNC: 0.8 MMOL/L (ref 0.5–2.2)
LPM: 4
LYMPHOCYTES # BLD AUTO: 0.6 K/UL (ref 1–4.8)
LYMPHOCYTES NFR BLD: 9 % (ref 18–48)
MAGNESIUM SERPL-MCNC: 2.5 MG/DL (ref 1.6–2.6)
MAGNESIUM SERPL-MCNC: 2.6 MG/DL (ref 1.6–2.6)
MCH RBC QN AUTO: 28.8 PG (ref 27–31)
MCHC RBC AUTO-ENTMCNC: 32.6 G/DL (ref 32–36)
MCV RBC AUTO: 88 FL (ref 82–98)
METHEMOGLOBIN: 0.3 % (ref 0–3)
MONOCYTES # BLD AUTO: 0.4 K/UL (ref 0.3–1)
MONOCYTES NFR BLD: 5.6 % (ref 4–15)
NEUTROPHILS # BLD AUTO: 5.3 K/UL (ref 1.8–7.7)
NEUTROPHILS NFR BLD: 83.8 % (ref 38–73)
NICOTINE SERPL-MCNC: <3 NG/ML
NRBC BLD-RTO: 0 /100 WBC
PCO2 BLDA: 36.1 MMHG (ref 35–45)
PCO2 BLDA: 36.7 MMHG (ref 35–45)
PCO2 BLDA: 36.8 MMHG (ref 35–45)
PH SMN: 7.41 [PH] (ref 7.35–7.45)
PH SMN: 7.42 [PH] (ref 7.35–7.45)
PH SMN: 7.42 [PH] (ref 7.35–7.45)
PHOSPHATE SERPL-MCNC: 3.3 MG/DL (ref 2.7–4.5)
PHOSPHATE SERPL-MCNC: 5 MG/DL (ref 2.7–4.5)
PLATELET # BLD AUTO: 263 K/UL (ref 150–450)
PMV BLD AUTO: 10.3 FL (ref 9.2–12.9)
PO2 BLDA: 28 MMHG (ref 40–60)
PO2 BLDA: 28 MMHG (ref 40–60)
PO2 BLDA: 32 MMHG (ref 40–60)
POC BE: -1 MMOL/L
POC BE: -1 MMOL/L
POC BE: 0 MMOL/L
POC METHB: 0.3 % (ref 0–3)
POC PERFORMED BY: NORMAL
POC SATURATED O2: 53 % (ref 95–100)
POC SATURATED O2: 55 % (ref 95–100)
POC SATURATED O2: 64 % (ref 95–100)
POC TCO2: 24 MMOL/L (ref 24–29)
POC TCO2: 25 MMOL/L (ref 24–29)
POC TCO2: 25 MMOL/L (ref 24–29)
POC TEMPERATURE: 37 C
POCT GLUCOSE: 169 MG/DL (ref 70–110)
POCT GLUCOSE: 197 MG/DL (ref 70–110)
POCT GLUCOSE: 237 MG/DL (ref 70–110)
POCT GLUCOSE: 271 MG/DL (ref 70–110)
POTASSIUM SERPL-SCNC: 3.9 MMOL/L (ref 3.5–5.1)
POTASSIUM SERPL-SCNC: 4.3 MMOL/L (ref 3.5–5.1)
POTASSIUM SERPL-SCNC: 4.4 MMOL/L (ref 3.5–5.1)
RBC # BLD AUTO: 2.57 M/UL (ref 4.6–6.2)
SAMPLE: ABNORMAL
SITE: ABNORMAL
SODIUM SERPL-SCNC: 130 MMOL/L (ref 136–145)
SODIUM SERPL-SCNC: 132 MMOL/L (ref 136–145)
SODIUM SERPL-SCNC: 133 MMOL/L (ref 136–145)
SPECIMEN SOURCE: NORMAL
STRONGYLOIDES ANTIBODY IGG: NEGATIVE
VWF AG ACT/NOR PPP IA: 311 % (ref 55–200)
VWF:AC ACT/NOR PPP IA: 240 % (ref 55–200)
WBC # BLD AUTO: 6.3 K/UL (ref 3.9–12.7)

## 2023-11-17 PROCEDURE — 27000221 HC OXYGEN, UP TO 24 HOURS: Mod: NTX

## 2023-11-17 PROCEDURE — 85025 COMPLETE CBC W/AUTO DIFF WBC: CPT | Mod: NTX | Performed by: STUDENT IN AN ORGANIZED HEALTH CARE EDUCATION/TRAINING PROGRAM

## 2023-11-17 PROCEDURE — 25000003 PHARM REV CODE 250: Mod: NTX | Performed by: INTERNAL MEDICINE

## 2023-11-17 PROCEDURE — 51798 US URINE CAPACITY MEASURE: CPT | Mod: NTX

## 2023-11-17 PROCEDURE — 94761 N-INVAS EAR/PLS OXIMETRY MLT: CPT | Mod: NTX

## 2023-11-17 PROCEDURE — 63600175 PHARM REV CODE 636 W HCPCS: Mod: NTX | Performed by: STUDENT IN AN ORGANIZED HEALTH CARE EDUCATION/TRAINING PROGRAM

## 2023-11-17 PROCEDURE — 99900035 HC TECH TIME PER 15 MIN (STAT): Mod: NTX

## 2023-11-17 PROCEDURE — 99291 CRITICAL CARE FIRST HOUR: CPT | Mod: FS,NTX,, | Performed by: INTERNAL MEDICINE

## 2023-11-17 PROCEDURE — 99223 1ST HOSP IP/OBS HIGH 75: CPT | Mod: NTX,,, | Performed by: CLINICAL NURSE SPECIALIST

## 2023-11-17 PROCEDURE — 97116 GAIT TRAINING THERAPY: CPT | Mod: NTX

## 2023-11-17 PROCEDURE — 90739 HEPB VACC 2/4 DOSE ADULT IM: CPT | Mod: NTX | Performed by: INTERNAL MEDICINE

## 2023-11-17 PROCEDURE — 99223 1ST HOSP IP/OBS HIGH 75: CPT | Mod: NTX,,, | Performed by: INTERNAL MEDICINE

## 2023-11-17 PROCEDURE — 63600175 PHARM REV CODE 636 W HCPCS: Mod: NTX | Performed by: INTERNAL MEDICINE

## 2023-11-17 PROCEDURE — 84100 ASSAY OF PHOSPHORUS: CPT | Mod: NTX | Performed by: STUDENT IN AN ORGANIZED HEALTH CARE EDUCATION/TRAINING PROGRAM

## 2023-11-17 PROCEDURE — 99292 CRITICAL CARE ADDL 30 MIN: CPT | Mod: NTX,,, | Performed by: INTERNAL MEDICINE

## 2023-11-17 PROCEDURE — 83605 ASSAY OF LACTIC ACID: CPT | Mod: NTX | Performed by: INTERNAL MEDICINE

## 2023-11-17 PROCEDURE — 97535 SELF CARE MNGMENT TRAINING: CPT | Mod: NTX

## 2023-11-17 PROCEDURE — 83735 ASSAY OF MAGNESIUM: CPT | Mod: NTX | Performed by: STUDENT IN AN ORGANIZED HEALTH CARE EDUCATION/TRAINING PROGRAM

## 2023-11-17 PROCEDURE — 83050 HGB METHEMOGLOBIN QUAN: CPT | Mod: NTX

## 2023-11-17 PROCEDURE — 85730 THROMBOPLASTIN TIME PARTIAL: CPT | Mod: NTX | Performed by: STUDENT IN AN ORGANIZED HEALTH CARE EDUCATION/TRAINING PROGRAM

## 2023-11-17 PROCEDURE — 25000003 PHARM REV CODE 250: Mod: NTX | Performed by: STUDENT IN AN ORGANIZED HEALTH CARE EDUCATION/TRAINING PROGRAM

## 2023-11-17 PROCEDURE — 80048 BASIC METABOLIC PNL TOTAL CA: CPT | Mod: 91,NTX | Performed by: INTERNAL MEDICINE

## 2023-11-17 PROCEDURE — 90472 IMMUNIZATION ADMIN EACH ADD: CPT | Mod: NTX | Performed by: STUDENT IN AN ORGANIZED HEALTH CARE EDUCATION/TRAINING PROGRAM

## 2023-11-17 PROCEDURE — 80048 BASIC METABOLIC PNL TOTAL CA: CPT | Mod: 91,NTX | Performed by: REGISTERED NURSE

## 2023-11-17 PROCEDURE — 80048 BASIC METABOLIC PNL TOTAL CA: CPT | Mod: NTX | Performed by: STUDENT IN AN ORGANIZED HEALTH CARE EDUCATION/TRAINING PROGRAM

## 2023-11-17 PROCEDURE — 63600367 HC NITRIC OXIDE PER HOUR: Mod: NTX

## 2023-11-17 PROCEDURE — 82803 BLOOD GASES ANY COMBINATION: CPT | Mod: NTX

## 2023-11-17 PROCEDURE — 90471 IMMUNIZATION ADMIN: CPT | Mod: NTX | Performed by: INTERNAL MEDICINE

## 2023-11-17 PROCEDURE — 25000003 PHARM REV CODE 250: Mod: NTX | Performed by: REGISTERED NURSE

## 2023-11-17 PROCEDURE — 99232 SBSQ HOSP IP/OBS MODERATE 35: CPT | Mod: NTX,,, | Performed by: PHYSICIAN ASSISTANT

## 2023-11-17 PROCEDURE — 83735 ASSAY OF MAGNESIUM: CPT | Mod: 91,NTX | Performed by: INTERNAL MEDICINE

## 2023-11-17 PROCEDURE — 84100 ASSAY OF PHOSPHORUS: CPT | Mod: 91,NTX | Performed by: INTERNAL MEDICINE

## 2023-11-17 PROCEDURE — 90632 HEPA VACCINE ADULT IM: CPT | Mod: NTX | Performed by: STUDENT IN AN ORGANIZED HEALTH CARE EDUCATION/TRAINING PROGRAM

## 2023-11-17 PROCEDURE — 97530 THERAPEUTIC ACTIVITIES: CPT | Mod: NTX

## 2023-11-17 PROCEDURE — 20000000 HC ICU ROOM: Mod: NTX

## 2023-11-17 RX ORDER — FUROSEMIDE 10 MG/ML
60 INJECTION INTRAMUSCULAR; INTRAVENOUS ONCE
Status: COMPLETED | OUTPATIENT
Start: 2023-11-17 | End: 2023-11-17

## 2023-11-17 RX ORDER — FUROSEMIDE 10 MG/ML
80 INJECTION INTRAMUSCULAR; INTRAVENOUS ONCE
Status: COMPLETED | OUTPATIENT
Start: 2023-11-17 | End: 2023-11-17

## 2023-11-17 RX ADMIN — ATORVASTATIN CALCIUM 40 MG: 40 TABLET, FILM COATED ORAL at 08:11

## 2023-11-17 RX ADMIN — INSULIN ASPART 9 UNITS: 100 INJECTION, SOLUTION INTRAVENOUS; SUBCUTANEOUS at 05:11

## 2023-11-17 RX ADMIN — POTASSIUM BICARBONATE 50 MEQ: 978 TABLET, EFFERVESCENT ORAL at 06:11

## 2023-11-17 RX ADMIN — THERA TABS 1 TABLET: TAB at 08:11

## 2023-11-17 RX ADMIN — HEPARIN SODIUM AND DEXTROSE 16 UNITS/KG/HR: 10000; 5 INJECTION INTRAVENOUS at 01:11

## 2023-11-17 RX ADMIN — HEPATITIS B VACCINE (RECOMBINANT) ADJUVANTED 0.5 ML: 20 INJECTION, SOLUTION INTRAMUSCULAR at 06:11

## 2023-11-17 RX ADMIN — INSULIN ASPART 9 UNITS: 100 INJECTION, SOLUTION INTRAVENOUS; SUBCUTANEOUS at 08:11

## 2023-11-17 RX ADMIN — FUROSEMIDE 80 MG: 10 INJECTION, SOLUTION INTRAVENOUS at 10:11

## 2023-11-17 RX ADMIN — GABAPENTIN 300 MG: 300 CAPSULE ORAL at 09:11

## 2023-11-17 RX ADMIN — INSULIN ASPART 1 UNITS: 100 INJECTION, SOLUTION INTRAVENOUS; SUBCUTANEOUS at 10:11

## 2023-11-17 RX ADMIN — ASPIRIN 81 MG: 81 TABLET, COATED ORAL at 08:11

## 2023-11-17 RX ADMIN — AMIODARONE HYDROCHLORIDE 400 MG: 200 TABLET ORAL at 08:11

## 2023-11-17 RX ADMIN — FUROSEMIDE 60 MG: 10 INJECTION, SOLUTION INTRAMUSCULAR; INTRAVENOUS at 05:11

## 2023-11-17 RX ADMIN — HEPATITIS A VACCINE 1440 UNITS: 1440 INJECTION, SUSPENSION INTRAMUSCULAR at 06:11

## 2023-11-17 RX ADMIN — INSULIN ASPART 9 UNITS: 100 INJECTION, SOLUTION INTRAVENOUS; SUBCUTANEOUS at 12:11

## 2023-11-17 RX ADMIN — HYDRALAZINE HYDROCHLORIDE 25 MG: 25 TABLET, FILM COATED ORAL at 02:11

## 2023-11-17 RX ADMIN — ACETAMINOPHEN 650 MG: 325 TABLET ORAL at 09:11

## 2023-11-17 RX ADMIN — INSULIN ASPART 3 UNITS: 100 INJECTION, SOLUTION INTRAVENOUS; SUBCUTANEOUS at 05:11

## 2023-11-17 RX ADMIN — PANTOPRAZOLE SODIUM 40 MG: 40 TABLET, DELAYED RELEASE ORAL at 06:11

## 2023-11-17 RX ADMIN — HYDRALAZINE HYDROCHLORIDE 25 MG: 25 TABLET, FILM COATED ORAL at 09:11

## 2023-11-17 RX ADMIN — HEPARIN SODIUM AND DEXTROSE 16 UNITS/KG/HR: 10000; 5 INJECTION INTRAVENOUS at 07:11

## 2023-11-17 RX ADMIN — HYDRALAZINE HYDROCHLORIDE 25 MG: 25 TABLET, FILM COATED ORAL at 06:11

## 2023-11-17 NOTE — PROGRESS NOTES
[facility] - Cardiac Intensive Care  Heart Transplant  Progress Note    Patient Name: Radha Abbott  MRN: [id number]  Admission Date: 11/9/2023  Hospital Length of Stay: 8 days  Attending Physician: Sandhya Price MD  Primary Care Provider: Vasu Kong MD  Principal Problem:Acute on chronic combined systolic and diastolic CHF, NYHA class 4    Subjective:   Interval History: pt started on Ketty overnight for rise in SrCr. Net negative even this AM. Complaints of severe goodman pain, so removed.  SrCr down to 3.0 today. Continues on  5, 10 Ketty, and Lasix 10mg/hr.     CVP 6  SvO2 55%  CO 6.2  CI 3.1        Continuous Infusions:   sodium chloride 0.9%      DOBUTamine 5 mcg/kg/min (11/17/23 1101)    furosemide (LASIX) 500 mg in 50 mL infusion (conc: 10 mg/mL) 5 mg/hr (11/17/23 1101)    heparin (porcine) in D5W 16 Units/kg/hr (11/17/23 1101)    nitric oxide gas       Scheduled Meds:   amiodarone  400 mg Oral Daily    aspirin  81 mg Oral Daily    atorvastatin  40 mg Oral Daily    hydrALAZINE  25 mg Oral Q8H    insulin aspart U-100  9 Units Subcutaneous TIDWM    magnesium oxide  400 mg Oral Daily    multivitamin  1 tablet Oral Daily    pantoprazole  40 mg Oral Before breakfast    senna-docusate 8.6-50 mg  2 tablet Oral BID     PRN Meds:sodium chloride 0.9%, acetaminophen, dextrose 10%, dextrose 10%, gabapentin, glucagon (human recombinant), glucose, glucose, heparin (PORCINE), heparin (PORCINE), insulin aspart U-100    Review of patient's allergies indicates:  No Known Allergies  Objective:     Vital Signs (Most Recent):  Temp: 98.2 °F (36.8 °C) (11/17/23 1101)  Pulse: 82 (11/17/23 1101)  Resp: (!) 21 (11/17/23 1101)  BP: (!) 99/58 (11/17/23 1101)  SpO2: 100 % (11/17/23 1101) Vital Signs (24h Range):  Temp:  [97.8 °F (36.6 °C)-98.7 °F (37.1 °C)] 98.2 °F (36.8 °C)  Pulse:  [80-90] 82  Resp:  [10-28] 21  SpO2:  [99 %-100 %] 100 %  BP: ()/(52-72) 99/58     Patient Vitals for the past 72 hrs (Last 3  "readings):   Weight   11/17/23 0400 76 kg (167 lb 8.8 oz)       Body mass index is 22.72 kg/m².      Intake/Output Summary (Last 24 hours) at 11/17/2023 1136  Last data filed at 11/17/2023 1101  Gross per 24 hour   Intake 1653.76 ml   Output 1350 ml   Net 303.76 ml         Hemodynamic Parameters:            Physical Exam  Constitutional:       Comments: Alert, Oriented, No acute distress   HENT:      Head: Normocephalic and atraumatic.   Eyes:      Conjunctiva/sclera: Conjunctivae normal.   Neck:      Vascular: No hepatojugular reflux or JVD.      Comments: JVP does not appear elevated.   Cardiovascular:      Rate and Rhythm: Normal rate and regular rhythm.   Pulmonary:      Comments: Normal effort, Clear to auscultation   Abdominal:      Comments: Soft, Non-tender, Non-distended   Musculoskeletal:      Cervical back: Normal range of motion.      Right lower leg: No edema.      Left lower leg: No edema.      Comments: No peripheral edema   Skin:     Comments: Dry, Intact, Warm, Capillary refill <2 seconds.    Neurological:      Mental Status: He is alert and oriented to person, place, and time.      Comments: Normal speech   Psychiatric:         Mood and Affect: Mood and affect normal.            Significant Labs:  CBC:  Recent Labs   Lab 11/15/23  0401 11/16/23  0436 11/17/23  0405   WBC 7.02 7.21 6.30   RBC 2.77* 2.70* 2.57*   HGB 8.0* 7.8* 7.4*   HCT 24.9* 23.9* 22.7*    257 263   MCV 90 89 88   MCH 28.9 28.9 28.8   MCHC 32.1 32.6 32.6       BNP:  No results for input(s): "BNP" in the last 168 hours.    Invalid input(s): "BNPTRIAGELBLO"    CMP:  Recent Labs   Lab 11/10/23  1816 11/11/23  0508 11/16/23  0436 11/16/23  1340 11/17/23  0405   *   < > 157* 328* 135*   CALCIUM 9.2   < > 9.1 8.9 8.9   ALBUMIN 3.8  --   --   --   --    PROT 7.3  --   --   --   --       < > 133* 132* 132*   K 4.8   < > 4.0 4.3 3.9   CO2 21*   < > 23 22* 20*      < > 97 98 97   BUN 57*   < > 63* 64* 68* "   CREATININE 2.5*   < > 3.0* 3.3* 3.0*   ALKPHOS 89  --   --   --   --    ALT 37  --   --   --   --    AST 19  --   --   --   --    BILITOT 1.0  --   --   --   --     < > = values in this interval not displayed.        Coagulation:   Recent Labs   Lab 11/11/23  0958 11/11/23  1600 11/15/23  0401 11/15/23  1601 11/16/23  0436 11/17/23  0405   INR 1.2  --   --  1.1  --   --    APTT 31.3   < > 49.4*  --  56.0* 58.1*    < > = values in this interval not displayed.       LDH:  Recent Labs   Lab 11/15/23  1601          Microbiology:  Microbiology Results (last 7 days)       ** No results found for the last 168 hours. **            I have reviewed all pertinent labs within the past 24 hours.    Estimated Creatinine Clearance: 27.8 mL/min (A) (based on SCr of 3 mg/dL (H)).    Diagnostic Results:  I have reviewed all pertinent imaging results/findings within the past 24 hours.  Assessment and Plan:     63 year old male with hx of CAD s/p 3v CABG (unclear anatomy, 2009), ICM with a recent EF of 15-20% s/p ICD (medtronik 2009), DM2 (a1c 7.7), HTN, HLD, Vfib on amio who presents to the ED with CC of SOB     Pt was recently admitted to [facility] as a transfer.  He was started on a Lasix gtt and did well.  Started on gDMT and discharged home on  5 with plans to follow up in HTS clinic for ongoing transplant evaluation at another facility.  He says that about a few days ago he started to notice LE swelling.  This turned into Nelson and orthopnea.  He says he can't walk to the bathroom without getting SOB.  He also complains of weight gain.  He takes torsemide 20mg BID at home and was told to trial additional Lasix which he did without any improvement.  He was rx metolazone but has not been filled.  He came to the ED     In the ED he was AF with stable VS on RA.  CBC showing chronic anemia.  CMP notable for acute on chronic CKD with baseline around 2.1 and Cr now 2.6.  BNP elevated.  Lactate neg.  CXR showing pulmonary edema.  I  evaluated the pt at the bedside.  Bedside TTE showing CVP >15.  He was subsequently admitted to the CCU for diuresis.     He was continued on his home  and was started on a lasix gtt, which he responded well to overnight (net -1700cc. He feels much better this morning as well. Our transplant coordinators have been working on insurance approval and he is now being transferred to [facility] for transplant evaluation.     * Acute on chronic combined systolic and diastolic CHF, NYHA class 4  Pt with known ICM with an EF of 25% on home  presenting with decompensated HF.  Not in cardiogenic shock    [facility] 11/14: RA 14, PA 70/35, PCWP 32, CO 4.1, CI 2.1.     - Home  gtt at 5  - home GDMT: entresto 24/26mg BID (on hold 2/2 IVÁN), Hydralazine 25 q8h being continued  - home diuretics: Was taking lasix 40 bid  - CVP 5 this AM. Monitor CVP and sCr trend.  - CTS to follow regarding potential need for upgraded support to temporary MCS as bridge to advanced therapies.l   - Continue VAD/OHTx workup. On step 4  -Blood type A+  -PT/OT and nutrition   -Continue current support with  5, Ketty, and Lasix gtt @10/mg/hr.     PAF (paroxysmal atrial fibrillation)  Known hx of pAF. In sinus rhythm on admission, but 1 run of AF RVR overnight. He spontaneously converted.  - Continue home amiodarone 400mg qd  - Stop home Eliquis and continue heparin gtt while in the hospital.       IVÁN (acute kidney injury)  IVÁN on CKD2. Baseline Cr of 1.7-2.0.   Currently 3.0  - Trend along with CVP  - Hold ARNi, entresto  -Trend BMPs daily     Type 2 diabetes mellitus without complication, without long-term current use of insulin  -A1c 7.6, not insulin dependent  - MDSSI  -diabetic diet ordered/Boost glucose control   - Endocrine following     CAD (coronary artery disease)  -S/p 3vCABG in 2009  - continue home ASA, HI statin    Ventricular tachycardia  Hx VT s/p ICD placement (medtronic 2009)  - Continue home amio 400mg qd  -monitor telemetry      .Uninterrupted Critical Care/Counseling Time (not including procedures): 60 mins       Hugh Duran NP  Heart Transplant  [facility] - Cardiac Intensive Care

## 2023-11-17 NOTE — PLAN OF CARE
0650 - Mother called RN to room for nasal suctioning, suctioning performed and noted infant had increased work of breathing, RR increased, more labored than previous assessment. O2 increased to 1.5lpm after suctioning for respiratory effort, RT notified.     0700 - Nasal suctioning repeated, strong, tight, congested cough noted, RR and work of breathing not improved after nasal suctioning. Dayshift RN Isabel at bedside for report, RT notified that infant appears worse after suctioning and oxygen increased, will come to bedside to evaluate. Raymundo Turner R.N.   HTS Care Update Note     CVP: 6  SVO2: 47  CI: 2.45  CO: 4.9  SVR: 1184    UO: 950, net +600    Infusions:  5, lasix gtt 5, hep gtt    Mechanical support: n/a    Plan:  Will add Ketty 10 with Cr going up and CO 4.9, Cr 3.3 now  Plan discussed with attending     Omid Manzanares MD  Cardiovascular Disease PGY-V  [facility]

## 2023-11-17 NOTE — NURSING
Nurses Note -- 4 Eyes      11/16/2023   7:32 PM      Skin assessed during: Daily Assessment      [x] No Altered Skin Integrity Present    []Prevention Measures Documented      [] Yes- Altered Skin Integrity Present or Discovered   [] LDA Added if Not in Epic (Describe Wound)   [] New Altered Skin Integrity was Present on Admit and Documented in LDA   [] Wound Image Taken    Wound Care Consulted? No    Attending Nurse:  KYAW Goncalves    Second RN/Staff Member: KYAW Pacheco

## 2023-11-17 NOTE — ASSESSMENT & PLAN NOTE
BG goal 140-180   T2DM.  BG at or above goal.  Improved with mealtime insulin increased.  Will continue to monitor.    Continue Novolog 9 units TID with meals   Low Dose Correction Scale (given kidney function)  BG monitoring ac/hs    ** Please call Endocrine for any BG related issues **    Discharge plans: TBD    Lab Results   Component Value Date    HGBA1C 7.6 (H) 10/12/2023

## 2023-11-17 NOTE — NURSING
Rounded on patient. Wife and extended family at bedside. Wife filled out home inspection checklist and community numbers. I gave patient the HM3 handbook and workbook. I told him that he can start reviewing this material as this will be what he will learn for the LVAD. Wife states that she will have him start reviewing it this weekend.

## 2023-11-17 NOTE — CONSULTS
[facility] - Cardiac Intensive Care  Infectious Disease  Consult Note    Patient Name: Radha Abbott  MRN: [id number]  Admission Date: 11/9/2023  Hospital Length of Stay: 8 days  Attending Physician: Sandhya Price MD  Primary Care Provider: Vasu Kong MD     Isolation Status: No active isolations    Patient information was obtained from patient and ER records.      Inpatient consult to Infectious Diseases  Consult performed by: Bharati Hansen DO  Consult ordered by: Hugh Duran NP        Assessment/Plan:     Cardiac/Vascular  Pre-transplant evaluation for heart transplant  Assessment and Plan    1. Risks of Infection: Available serologies were reviewed. No unusual risks of infection or significant barriers to transplantation were identified from the infectious disease standpoint given the information available at this time.    - Acute infectious issues: None   - Pending serologies: Strongyloides IgG, CMV, toxoplasma   - Please call if any pending serologic testing is positive.    2. Immunizations:  Based on the patient's immunization history and serologies, the following immunizations are recommended:  - Hepatitis A    Patient does not have immunity to hepatitis A    Vaccination ordered today: Yes   - Hepatitis B    Patient does have immunity to hepatitis B    Vaccination ordered today: Yes (2nd dose)   - COVID    Current CDC vaccination recommendations were discussed with the patient   - Annual high dose influenza     Vaccination ordered today: No. Reason for not ordering: vaccination up to date   - Prevnar 20    Vaccination ordered today: No. Reason for not ordering: vaccination up to date   - Tdap    Vaccination ordered today: No. Reason for not ordering: vaccination up to date   - Shingrix    Vaccination ordered today: No. Reason for not ordering: not available inpatient  (needs 2nd dose)    Recommended Pre-Transplant Immunization Schedule   Vaccine  0m 1m 2m 6m   Pneumococcal conjugate vaccine  (Prevnar 20) X      Tetanus-diphtheria-pertussis (Tdap)* X      Hepatitis A Vaccine (Havrix)** X   X   Hepatitis B Vaccine (Heplisav)** X X     Influenza (annual) X      Zoster Recombinant Vaccine (Shingrix) X  X           *Administer booster every 10 years.       **Administer if no immunity demonstrated on serologies               Patient will receive vaccines while inpatient, and should be provided with a prescription to receive all subsequent vaccine does at local pharmacy or should visit his PCP.     3. Counseling:   I discussed with the patient the risk for increased susceptibility to infections following transplantation including increased risk for infection right after transplant and if rejection should occur.  The patient has been counseled on the importance of vaccinations to decrease risk of infection and severe illness. Specific guidance has been provided to the patient regarding the patient's occupation, hobbies and activities to avoid future infectious complications.     4. Transplant Candidacy: Based on available information, there are no identified significant barriers to transplantation from an infectious disease standpoint.  Final determination of transplant candidacy will be made once evaluation is complete and reviewed by the Selection Committee.      Follow up with infectious disease as needed.               Thank you for your consult. I will sign off. Please contact us if you have any additional questions.    Bharati Hansen, DO  Infectious Disease  [facility] - Cardiac Intensive Care    Subjective:     Principal Problem: Acute on chronic combined systolic and diastolic CHF, NYHA class 4    HPI:   PRE-TRANSPLANT INFECTIOUS DISEASE CONSULT    Reason for Visit:  Pre-transplant evaluation  Referring Provider: Aaareferral Self     History of Present Illness:    Mr. Abbott is a 63M with PMH of CAD s/p 3vCABG in 2009, ICM with a recent EF of 15-20% s/p ICD in 2009, DM2, HTN, HLD, Vfib on amio. Infectious  "disease consulted for "heart transplant/LVAD".     Infectious History:  Recent hospital admissions: Yes - Oct and Nov 2023, all related to heart issues  Recent infections: No  Recent or current antibiotic use: No  History of recurrent infections *(sinus / pneumonia / UTI / SBP)*: No  History of diabetic foot wound, bone/joint infection: No  Recent dental infections, issues or procedures: No  History of chicken pox: No  History of shingles: No  History of STI: No  History of COVID infection: Yes - 2021, was not hospitalized    History of Immunosuppression:  Prior chemotherapy / immunosuppression: No  Prior transplant: No  History of splenectomy: No    Tuberculosis:  Prior screening for latent TB: No  Prior diagnosis of latent TB: No  Risk factors for TB *known exposure, incarceration, homelessness*: No    Geographical exposures:  Currently lives in [place] with wife  Lived in the following states:   Lived in or travelled to [facility]: Yes - travelled through this area while he was a   International travel: No  Travel-associated illness: No    Social/Environmental:  Occupation:  previously a  cross country, 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  No  Tobacco: No - quit in Oct 2023. Used to smoke 1ppd since age 21  Alcohol: Yes - ocassional  Recreational drug use:  No  Sexual partners: wife      Past Histories:   Past Medical History:   Diagnosis Date    CAD (coronary artery disease)     Diabetes mellitus     HFrEF (heart failure with reduced ejection fraction)     ICD (implantable cardioverter-defibrillator) in place     MI, old      Past Surgical History:   Procedure Laterality Date    CARDIAC SURGERY      RIGHT HEART CATHETERIZATION Right 10/10/2023    Procedure: INSERTION, CATHETER, RIGHT HEART;  Surgeon: Bin Gandhi MD;  Location: [facility]" "CATH LAB;  Service: Cardiology;  Laterality: Right;    RIGHT HEART CATHETERIZATION Right 10/13/2023    Procedure: INSERTION, CATHETER, RIGHT HEART;  Surgeon: Walter Mcintyre MD;  Location: [facility] CATH LAB;  Service: Cardiology;  Laterality: Right;    RIGHT HEART CATHETERIZATION  11/13/2023    RIGHT HEART CATHETERIZATION Right 11/13/2023    Procedure: INSERTION, CATHETER, RIGHT HEART;  Surgeon: Juventino Bermudez Jr., MD;  Location: [facility] CATH LAB;  Service: Cardiology;  Laterality: Right;     No family history on file.  Social History     Tobacco Use    Smoking status: Every Day     Current packs/day: 0.50     Types: Cigarettes   Substance Use Topics    Alcohol use: Yes     Comment: rarely    Drug use: No     Review of patient's allergies indicates:  No Known Allergies      Immunization History:  Received all childhood vaccines: Yes  All household members receive annual flu vaccine: No - not yet this year  All household members are up to date on COVID vaccine: Yes      Labs:    CBC:   Lab Results   Component Value Date    WBC 6.30 11/17/2023    HGB 7.4 (L) 11/17/2023    HCT 22.7 (L) 11/17/2023    MCV 88 11/17/2023     11/17/2023    GRAN 5.3 11/17/2023    GRAN 83.8 (H) 11/17/2023    LYMPH 0.6 (L) 11/17/2023    LYMPH 9.0 (L) 11/17/2023    MONO 0.4 11/17/2023    MONO 5.6 11/17/2023    EOSINOPHIL 0.8 11/17/2023       Syphilis screening:   Lab Results   Component Value Date    RPR Non-reactive 11/15/2023        TB screening:   Lab Results   Component Value Date    TBGOLDPLUS Negative 11/15/2023       HIV screening:   Lab Results   Component Value Date    [id number] Non-reactive 11/15/2023       Strongyloides IgG: No results found for: "STRONGANTIGG"    Hepatitis Serologies:   Lab Results   Component Value Date    HEPAIGG Non-reactive 11/15/2023    HEPBCAB Non-reactive 11/15/2023    HEPBSAB 72.13 11/15/2023    HEPBSAB Reactive 11/15/2023    HEPCAB Non-reactive 11/15/2023        Varicella IgG:   Lab Results "   Component Value Date    VARICELLAINT Positive 11/15/2023         Immunization History   Administered Date(s) Administered    COVID-19, MRNA, LN-S, PF (MODERNA FULL 0.5 ML DOSE) 03/18/2021, 04/15/2021         Past Medical History:   Diagnosis Date    CAD (coronary artery disease)     Diabetes mellitus     HFrEF (heart failure with reduced ejection fraction)     ICD (implantable cardioverter-defibrillator) in place     MI, old        Past Surgical History:   Procedure Laterality Date    CARDIAC SURGERY      RIGHT HEART CATHETERIZATION Right 10/10/2023    Procedure: INSERTION, CATHETER, RIGHT HEART;  Surgeon: Bin Gandhi MD;  Location: [facility] CATH LAB;  Service: Cardiology;  Laterality: Right;    RIGHT HEART CATHETERIZATION Right 10/13/2023    Procedure: INSERTION, CATHETER, RIGHT HEART;  Surgeon: Walter Mcintyre MD;  Location: [facility] CATH LAB;  Service: Cardiology;  Laterality: Right;    RIGHT HEART CATHETERIZATION  11/13/2023    RIGHT HEART CATHETERIZATION Right 11/13/2023    Procedure: INSERTION, CATHETER, RIGHT HEART;  Surgeon: Juventino Bermudez Jr., MD;  Location: [facility] CATH LAB;  Service: Cardiology;  Laterality: Right;       Review of patient's allergies indicates:  No Known Allergies    Medications:  Medications Prior to Admission   Medication Sig    amiodarone (PACERONE) 400 MG tablet Take 1 tablet (400 mg total) by mouth once daily.    apixaban (ELIQUIS) 5 mg Tab Take 1 tablet (5 mg total) by mouth 2 (two) times daily.    aspirin (ECOTRIN) 81 MG EC tablet Take 81 mg by mouth once daily.    atorvastatin (LIPITOR) 40 MG tablet Take 40 mg by mouth.    DOBUTamine (DOBUTREX) 1,000 mg/250 mL (4,000 mcg/mL) infusion Inject 389.5 mcg/min into the vein continuous.    fish oil-omega-3 fatty acids 300-1,000 mg capsule Take 2 g by mouth once daily.    furosemide (LASIX) 40 MG tablet Take 1 tablet (40 mg total) by mouth 2 (two) times daily.    gabapentin (NEURONTIN) 300 MG capsule Take 300 mg by mouth nightly as  needed.    multivitamin capsule Take 1 capsule by mouth once daily.    pantoprazole (PROTONIX) 40 MG tablet Take 1 tablet (40 mg total) by mouth before breakfast.    potassium chloride SA (K-DUR,KLOR-CON) 20 MEQ tablet Take 1 tablet (20 mEq total) by mouth once daily.    sacubitriL-valsartan (ENTRESTO) 24-26 mg per tablet Take 1 tablet by mouth 2 (two) times daily.    metOLazone (ZAROXOLYN) 2.5 MG tablet Take 1 tablet (2.5 mg total) by mouth once daily. Thursday 11/9 and Saturday 11/10    torsemide (DEMADEX) 20 MG Tab Take 2 tablets (40 mg total) by mouth 2 (two) times a day.     Antibiotics (From admission, onward)      None          Antifungals (From admission, onward)      None          Antivirals (From admission, onward)      None             Immunization History   Administered Date(s) Administered    COVID-19, MRNA, LN-S, PF (MODERNA FULL 0.5 ML DOSE) 03/18/2021, 04/15/2021       Family History    None       Social History     Socioeconomic History    Marital status:    Tobacco Use    Smoking status: Every Day     Current packs/day: 0.50     Types: Cigarettes   Substance and Sexual Activity    Alcohol use: Yes     Comment: rarely    Drug use: No     Social Determinants of Health     Financial Resource Strain: Low Risk  (10/27/2023)    Overall Financial Resource Strain (CARDIA)     Difficulty of Paying Living Expenses: Not hard at all   Food Insecurity: No Food Insecurity (10/27/2023)    Hunger Vital Sign     Worried About Running Out of Food in the Last Year: Never true     Ran Out of Food in the Last Year: Never true   Transportation Needs: No Transportation Needs (10/27/2023)    PRAPARE - Transportation     Lack of Transportation (Medical): No     Lack of Transportation (Non-Medical): No   Physical Activity: Inactive (10/27/2023)    Exercise Vital Sign     Days of Exercise per Week: 0 days     Minutes of Exercise per Session: 0 min   Stress: No Stress Concern Present (10/27/2023)    [employer]  [employer] - Occupational Stress Questionnaire     Feeling of Stress : Not at all   Social Connections: Moderately Integrated (10/27/2023)    Social Connection and Isolation Panel [NHANES]     Frequency of Communication with Friends and Family: More than three times a week     Frequency of Social Gatherings with Friends and Family: More than three times a week     Attends Bahai Services: More than 4 times per year     Active Member of Clubs or Organizations: No     Attends Club or Organization Meetings: Never     Marital Status:    Housing Stability: Low Risk  (10/27/2023)    Housing Stability Vital Sign     Unable to Pay for Housing in the Last Year: No     Number of Places Lived in the Last Year: 1     Unstable Housing in the Last Year: No     Review of Systems   Constitutional:  Negative for chills and fever.   Respiratory:  Negative for cough and shortness of breath.    Cardiovascular:  Negative for chest pain.   Gastrointestinal:  Negative for abdominal pain, constipation, diarrhea, nausea and vomiting.   Skin:  Negative for rash.   Neurological:  Negative for headaches.     Objective:     Vital Signs (Most Recent):  Temp: 98.2 °F (36.8 °C) (11/17/23 1101)  Pulse: 85 (11/17/23 1301)  Resp: (!) 22 (11/17/23 1301)  BP: 100/68 (11/17/23 1301)  SpO2: 100 % (11/17/23 1301) Vital Signs (24h Range):  Temp:  [97.8 °F (36.6 °C)-98.7 °F (37.1 °C)] 98.2 °F (36.8 °C)  Pulse:  [80-90] 85  Resp:  [10-28] 22  SpO2:  [99 %-100 %] 100 %  BP: ()/(52-72) 100/68     Weight: 76 kg (167 lb 8.8 oz)  Body mass index is 22.72 kg/m².    Estimated Creatinine Clearance: 27.8 mL/min (A) (based on SCr of 3 mg/dL (H)).     Physical Exam  Vitals reviewed.   Constitutional:       General: He is not in acute distress.     Appearance: Normal appearance. He is not ill-appearing.   HENT:      Head: Normocephalic and atraumatic.   Eyes:      Extraocular Movements: Extraocular movements intact.      Conjunctiva/sclera:  Conjunctivae normal.   Cardiovascular:      Rate and Rhythm: Normal rate and regular rhythm.      Heart sounds: No murmur heard.  Pulmonary:      Effort: Pulmonary effort is normal. No respiratory distress.      Breath sounds: Normal breath sounds.   Abdominal:      General: Abdomen is flat. Bowel sounds are normal.      Palpations: Abdomen is soft.      Tenderness: There is no abdominal tenderness.   Musculoskeletal:      Cervical back: Normal range of motion.   Skin:     General: Skin is warm and dry.   Neurological:      General: No focal deficit present.      Mental Status: He is alert and oriented to person, place, and time.   Psychiatric:         Mood and Affect: Mood normal.         Behavior: Behavior normal.         Thought Content: Thought content normal.          Significant Labs: All pertinent labs within the past 24 hours have been reviewed.  Recent Lab Results  (Last 5 results in the past 24 hours)        11/17/23  1257   11/17/23  0811   11/17/23  0806   11/17/23  0431   11/17/23  0416        Performed By:         BETH       POC MetHb         0.3       Specimen source         Venous       Allens Test     N/A   N/A         BIPAP         0       Site     Other   Other         DelSys       Nasal Can         FiO2         36.0       LPM         4       POC BE     0   -1         POC HCO3     24.0   23.7         POC PCO2     36.8   36.1         POC PH     7.422   7.425         POC PO2     28   32         POC SATURATED O2     55   64         POC TCO2     25   25         POC Temp         37.0       POCT Glucose 197   169             Sample     VENOUS   VENOUS                                Significant Imaging: I have reviewed all pertinent imaging results/findings within the past 24 hours.

## 2023-11-17 NOTE — HPI
Mr. Abbott is a 63-year-old man who underwent destination therapy left ventricular assist device placement.  Postoperatively the chest was left open due to diffuse coagulopathy in RV dysfunction.  He did well post-operatively and underwent washout and closure on 12/4. The patient was extubated but had to be reintubated. A BAL was performed: Gram stain with GNB, culture is NGTD. ID is consulted for BAL results. The patient is agitated but quiets down with reassurance. No fever or chills. Ecgo done this am - results pending. Family at bedside.

## 2023-11-17 NOTE — PROGRESS NOTES
[facility] - Cardiac Intensive Care  Endocrinology  Progress Note    Admit Date: 11/9/2023     Reason for Consult: Management of T2DM, Hyperglycemia     Surgical Procedure and Date: n/a    Diabetes diagnosis year: 1998    Home Diabetes Medications:  Metformin (off since October)   Lab Results   Component Value Date    HGBA1C 7.6 (H) 10/12/2023       How often checking glucose at home?  Once daily in the AM    BG readings on regimen: 150-160s  Hypoglycemia on the regimen?  No  Missed doses on regimen?  n/a    Diabetes Complications include:     Hyperglycemia    Complicating diabetes co morbidities:   CAD s/p CABG, HTN, HLD      HPI:   Patient is a 63 y.o. male with a diagnosis of CAD s/p 3v CABG (unclear anatomy, 2009), ICM with a recent EF of 15-20% s/p ICD (medtronik 2009), DM2 (a1c 7.7), HTN, HLD, Vfib on amio who presents to the ED with CC of SOB. In the ED he was AF with stable VS on RA.  CBC showing chronic anemia.  CMP notable for acute on chronic CKD with baseline around 2.1 and Cr now 2.6.  BNP elevated.  Lactate neg.  CXR showing pulmonary edema. He was subsequently admitted to the CCU for diuresis.  Endocrinology consulted for management of T2DM.          Interval HPI:   No acute events overnight. Patient in room [id number]/XGAK0668 A. Blood glucose stable. BG at and above goal on current insulin regimen (SSI and prandial insulin). Steroid use- None .   3 Days Post-Op  Renal function- Abnormal - Cr 3.0   Vasopressors-  None      Diet NPO Except for: Medication      Eating:   NPO  Nausea: No  Hypoglycemia and intervention: No  Fever: No  TPN and/or TF: No    /66   Pulse 81   Temp 98.2 °F (36.8 °C) (Oral)   Resp (!) 21   Ht 6' (1.829 m)   Wt 76 kg (167 lb 8.8 oz)   SpO2 100%   BMI 22.72 kg/m²     Labs Reviewed and Include    Recent Labs   Lab 11/17/23  0405   *   CALCIUM 8.9   *   K 3.9   CO2 20*   CL 97   BUN 68*   CREATININE 3.0*     Lab Results   Component Value Date    WBC 6.30  11/17/2023    HGB 7.4 (L) 11/17/2023    HCT 22.7 (L) 11/17/2023    MCV 88 11/17/2023     11/17/2023     Recent Labs   Lab 11/15/23  1720   TSH 1.381     Lab Results   Component Value Date    HGBA1C 7.6 (H) 10/12/2023       Nutritional status:   Body mass index is 22.72 kg/m².  Lab Results   Component Value Date    ALBUMIN 3.8 11/10/2023    ALBUMIN 3.7 11/09/2023    ALBUMIN 3.1 (L) 11/01/2023     Lab Results   Component Value Date    PREALBUMIN 27 11/15/2023       Estimated Creatinine Clearance: 27.8 mL/min (A) (based on SCr of 3 mg/dL (H)).    Accu-Checks  Recent Labs     11/14/23  2123 11/15/23  0746 11/15/23  1207 11/15/23  1629 11/16/23  0755 11/16/23  1048 11/16/23  1213 11/16/23  1628 11/16/23  2207 11/17/23  0811   POCTGLUCOSE 253* 147* 214* 216* 173* 179* 314* 213* 156* 169*       Current Medications and/or Treatments Impacting Glycemic Control  Immunotherapy:    Immunosuppressants       None          Steroids:   Hormones (From admission, onward)      None          Pressors:    Autonomic Drugs (From admission, onward)      None          Hyperglycemia/Diabetes Medications:   Antihyperglycemics (From admission, onward)      Start     Stop Route Frequency Ordered    11/16/23 1645  insulin aspart U-100 pen 9 Units         -- SubQ 3 times daily with meals 11/16/23 1138    11/10/23 1602  insulin aspart U-100 pen 0-5 Units         -- SubQ Before meals & nightly PRN 11/10/23 1503            ASSESSMENT and PLAN    Cardiac/Vascular  * Acute on chronic combined systolic and diastolic CHF, NYHA class 4  Managed per primary team  Optimize BG control      Renal/  IVÁN (acute kidney injury)  Lab Results   Component Value Date    CREATININE 3.0 (H) 11/17/2023     Avoid insulin stacking  Titrate insulin slowly       Endocrine  Type 2 diabetes mellitus without complication, without long-term current use of insulin  BG goal 140-180   T2DM.  BG at or above goal.  Improved with mealtime insulin increased.  Will continue  to monitor.    Continue Novolog 9 units TID with meals   Low Dose Correction Scale (given kidney function)  BG monitoring ac/hs    ** Please call Endocrine for any BG related issues **    Discharge plans: TBD    Lab Results   Component Value Date    HGBA1C 7.6 (H) 10/12/2023               Albert Wilson PA-C  Endocrinology  [facility] - Cardiac Intensive Care

## 2023-11-17 NOTE — PT/OT/SLP PROGRESS
Occupational Therapy   Partial Co-Treatment  Partial Co-treatment performed due to patient's multiple deficits requiring two skilled therapists to appropriately and safely assess patient's strength and endurance while facilitating functional tasks in addition to accommodating for patient's activity tolerance.      Name: Radha Abbott  MRN: [id number]  Admitting Diagnosis:  Acute on chronic combined systolic and diastolic CHF, NYHA class 4  4 Days Post-Op    Recommendations:     Discharge Recommendations: No Therapy Indicated  Discharge Equipment Recommendations:  none  Barriers to discharge:  None    Assessment:     Radha Abbott is a 63 y.o. male with a medical diagnosis of Acute on chronic combined systolic and diastolic CHF, NYHA class 4.  He presents with the following performance deficits affecting function are weakness, impaired endurance, impaired self care skills, impaired functional mobility, gait instability, impaired balance, impaired cardiopulmonary response to activity. Pt tolerated session well today without incident. RN and RT present throughout functional mobility and heart monitor in tow. Pt remains limited in ADLs, functional mobility, and functional transfers and is currently not performing tasks at [facility] [address]. Pt would continue to benefit from skilled OT services to maximize functional independence with ADLs and functional mobility, reduce caregiver burden, and facilitate safe discharge in the least restrictive environment.    Rehab Prognosis:  Good; patient would benefit from acute skilled OT services to address these deficits and reach maximum level of function.       Plan:     Patient to be seen 2 x/week to address the above listed problems via self-care/home management, therapeutic activities, therapeutic exercises, neuromuscular re-education  Plan of Care Expires: 12/15/23  Plan of Care Reviewed with: patient, spouse    Subjective     Chief Complaint: none  Patient/Family Comments/goals: to return to  [facility]  Pain/Comfort:  Pain Rating 1: 0/10  Pain Rating Post-Intervention 1: 0/10    Objective:     Communicated with: RN prior to session.  Patient found up in chair with blood pressure cuff, pulse ox (continuous), telemetry, goodman catheter, central line (Ketty) upon OT entry to room.    General Precautions: Standard, fall    Orthopedic Precautions:N/A  Braces: N/A  Respiratory Status: Nasal cannula, flow 4 L/min     Occupational Performance:     Bed Mobility:    Not assessed- pt sitting in chair and returned to chair end of session    Functional Mobility/Transfers:  Patient completed Sit <> Stand Transfer with supervision  with  no assistive device   Functional Mobility: Pt engaged in functional mobility to simulate household/community distances ~380 ft in room and hallway with SBA and (R) UE on IV pole for support in order to maximize functional endurance and standing balance required for engagement in occupations of choice   No LOB or SOB noted    Activities of Daily Living:  Upper Body Dressing: minimum assistance required to don hospital gown seated on bedside chair  Lower Body Dressing: stand by assistance to don b/l socks seated on chair with forward flexed posture      [facility] 6 Click ADL: 21    Treatment & Education:  -Education on HEP with handout and green theraband  -Education on energy conservation and task modification to maximize safety and (I) during ADLs and mobility  -Education on importance of OOB activity to improve overall activity tolerance and promote recovery  -Pt educated to call for assistance and to transfer with hospital staff only  -Provided education regarding role of OT, POC, & discharge recommendations with pt and significant other verbalizing understanding.  Pt had no further questions & when asked whether there were any concerns pt reported none.     Patient left up in chair with all lines intact, call button in reach, and RN and significant other present    GOALS:   Multidisciplinary  Problems       Occupational Therapy Goals          Problem: Occupational Therapy    Goal Priority Disciplines Outcome Interventions   Occupational Therapy Goal     OT, PT/OT Ongoing, Progressing    Description: Goals to be met by: 12/15/23     Patient will increase functional independence with ADLs by performing:    LE Dressing with Supervision.  Grooming while standing at sink with Supervision.  Toileting from toilet with Supervision for hygiene and clothing management.   Supine to sit with Supervision.  Step transfer with Supervision  Lee with BUE HEP to improve activity tolerance to complete ADLs and IADLs  Within home ambulation distances with supervision and LRAD necessary to complete ADLs.                             Time Tracking:     OT Date of Treatment: 11/17/23  OT Start Time: 1327  OT Stop Time: 1350  OT Total Time (min): 23 min    Billable Minutes:Self Care/Home Management 10  Therapeutic Activity 13    OT/PRIETO: OT          11/17/2023

## 2023-11-17 NOTE — PLAN OF CARE
Problem: Physical Therapy  Goal: Physical Therapy Goal  Description: Goals to be met by: 12/15/23     Patient will increase functional independence with mobility by performin. Sit to stand transfer with Mcpherson  2. Gait  x 400 feet with Mcpherson using LRAD.   3. Lower extremity exercise program x30 reps per handout, with independence    Outcome: Ongoing, Progressing   Goals remain appropriate 2023

## 2023-11-17 NOTE — PT/OT/SLP PROGRESS
Physical Therapy Co-Treatment with OT    Patient Name:  Radha Abbott   MRN:  [id number]    Recommendations:     Discharge Recommendations: No Therapy Indicated  Discharge Equipment Recommendations: none  Barriers to discharge: None    Assessment:     Radha Abbott is a 63 y.o. male admitted with a medical diagnosis of Acute on chronic combined systolic and diastolic CHF, NYHA class 4.  He presents with the following impairments/functional limitations: weakness, gait instability, impaired balance, impaired endurance pt tolerated session well today with gait training in hallway for increased distance from last session demonstrating improvements in functional endurance. Pt will continue to benefit from skilled PT 2x/week to progress physically. Pt will not require further therapy following acute care stay and when medically stable.     Rehab Prognosis: Good; patient would benefit from acute skilled PT services to address these deficits and reach maximum level of function.    Recent Surgery: Procedure(s) (LRB):  INSERTION, CATHETER, RIGHT HEART (Right) 4 Days Post-Op    Plan:     During this hospitalization, patient to be seen 2 x/week to address the identified rehab impairments via gait training, therapeutic activities and progress toward the following goals:    Plan of Care Expires:  12/15/23    Subjective     Chief Complaint: none  Patient/Family Comments/goals: to get stronger  Pain/Comfort:  Pain Rating 1: 0/10  Pain Addressed 1: Reposition, Distraction  Pain Rating Post-Intervention 1: 0/10      Objective:     Communicated with nurse prior to session.  Patient found up in chair with blood pressure cuff, central line, goodman catheter, pulse ox (continuous), telemetry, peripheral IV (nitric oxide) with wife upon PT entry to room.     General Precautions: Standard, fall  Orthopedic Precautions: N/A  Braces: N/A  Respiratory Status: Nasal cannula, flow 4 L/min     Functional Mobility:  Transfers:     Sit to Stand:   supervision with no AD  Gait: 384 feet with SBA and RUE supported on IV pole, pt denied any dizziness or lightheadedness with gait trial, pt reports feeling some weakness in his legs but no instability or LOB noted during gait trial.IV pole, nitric oxide, and monitor in tow    RN and respiratory therapist present to assess vitals during treatment session.    AM-PAC 6 CLICK MOBILITY  Turning over in bed (including adjusting bedclothes, sheets and blankets)?: 4  Sitting down on and standing up from a chair with arms (e.g., wheelchair, bedside commode, etc.): 3  Moving from lying on back to sitting on the side of the bed?: 4  Moving to and from a bed to a chair (including a wheelchair)?: 3  Need to walk in hospital room?: 3  Climbing 3-5 steps with a railing?: 3  Basic Mobility Total Score: 20       Treatment & Education:  PT verbally educated pt and pt's wife on PT POC with both verbalizing understanding of such. PT encouraged pt to continue walk with nursing to maintain progress.    Patient left up in chair with all lines intact, call button in reach, and wife and nurse present..    GOALS:   Multidisciplinary Problems       Physical Therapy Goals          Problem: Physical Therapy    Goal Priority Disciplines Outcome Goal Variances Interventions   Physical Therapy Goal     PT, PT/OT Ongoing, Progressing     Description: Goals to be met by: 12/15/23     Patient will increase functional independence with mobility by performin. Sit to stand transfer with Runnels  2. Gait  x 400 feet with Runnels using LRAD.   3. Lower extremity exercise program x30 reps per handout, with independence                   Multidisciplinary Problems (Resolved)          Problem: Physical Therapy    Goal Priority Disciplines Outcome Goal Variances Interventions   Physical Therapy Goal   (Resolved)     PT, PT/OT Met     Description: Goals to be met by: 23     Patient will increase functional independence with mobility by  performin. Evaluate pt's need for physical therapy.                          Time Tracking:     PT Received On: 23  PT Start Time: 1335     PT Stop Time: 1348  PT Total Time (min): 13 min     Billable Minutes: Gait Training 13 minutes    Treatment Type: Treatment  PT/PTA: PT     Number of PTA visits since last PT visit: 0     2023

## 2023-11-17 NOTE — PLAN OF CARE
CICU Care Plan    POC reviewed with Radha Abbott and family at 1902. Pt verbalized understanding. Questions and concerns addressed. No acute events noted tonight. Pt progressing toward goals. Security measures in place, plan of care to continue. See below and flowsheets for full assessment and VS info.     CVP: 6 , 8,  5  SvO2: 47,  64    Gtts:  Heparin @ 16units/kg/hr - APTT this AM 58.1  Dobutamine @ 5 mcg/kg/min  Lasix @ 5mg/hr  Nitric @ 10 PPM    Neuro:  Michelle Coma Scale  Best Eye Response: 4-->(E4) spontaneous  Best Motor Response: 6-->(M6) obeys commands  Best Verbal Response: 5-->(V5) oriented  Michelle Coma Scale Score: 15  Assessment Qualifiers: patient not sedated/intubated, no eye obstruction present  Pupil PERRLA: yes     24 hr Temp:  [97.4 °F (36.3 °C)-98.7 °F (37.1 °C)]     CV:   Rhythm: normal sinus rhythm  BP goals:   MAP > 65    Resp:      Oxygen Concentration (%): 36    Plan: 4L nasal cannula with nitric @ 10 PPM    GI/:     Diet/Nutrition Received: consistent carb/diabetic diet  Last Bowel Movement: 11/16/23  Voiding Characteristics: urethral catheter (bladder)    Intake/Output Summary (Last 24 hours) at 11/17/2023 0642  Last data filed at 11/17/2023 0602  Gross per 24 hour   Intake 1716.64 ml   Output 1625 ml   Net 91.64 ml     Unmeasured Output  Urine Occurrence: 1  Stool Occurrence: 1    Labs/Accuchecks:  Recent Labs   Lab 11/17/23  0405   WBC 6.30   RBC 2.57*   HGB 7.4*   HCT 22.7*         Recent Labs   Lab 11/10/23  1816 11/11/23  0508 11/17/23  0405      < > 132*   K 4.8   < > 3.9   CO2 21*   < > 20*      < > 97   BUN 57*   < > 68*   CREATININE 2.5*   < > 3.0*   ALKPHOS 89  --   --    ALT 37  --   --    AST 19  --   --    BILITOT 1.0  --   --     < > = values in this interval not displayed.      Recent Labs   Lab 11/15/23  1601 11/16/23  0436 11/17/23  0405   INR 1.1  --   --    APTT  --    < > 58.1*    < > = values in this interval not displayed.    No results for  "input(s): "CPK", "CPKMB", "TROPONINI", "MB" in the last 168 hours.    Electrolytes: Electrolytes replaced   0504- HTS Fellow performing final rounds for shift. Notified of Pt K 3.9 and SrCr 3.0 this AM. Orders placed for replacement.   Accuchecks: ACHS    Gtts:   sodium chloride 0.9%      DOBUTamine 5 mcg/kg/min (11/17/23 0602)    furosemide (LASIX) 500 mg in 50 mL infusion (conc: 10 mg/mL) 5 mg/hr (11/17/23 0602)    heparin (porcine) in D5W 16 Units/kg/hr (11/17/23 0602)    nitric oxide gas         LDA/Wounds:  Lines/Drains/Airways       Central Venous Catheter Line  Duration             Percutaneous Central Line Insertion/Assessment - Triple Lumen  11/14/23 1630 2 days              Drain  Duration                  Urethral Catheter 11/13/23 1800 Straight-tip 16 Fr. 3 days              Peripheral Intravenous Line  Duration                  Midline Catheter Insertion/Assessment  - Single Lumen 11/14/23 1113 Left brachial vein 20g x 10cm 2 days                  Wounds: No  Wound care consulted: No    "

## 2023-11-17 NOTE — HPI
Pt is a 63 year old male with hx of CAD s/p 3v CABG (unclear anatomy, 2009), ICM with a recent EF of 15-20% s/p ICD (elsa 2009), DM2 (a1c 7.7), HTN, HLD, Vfib on amio who presents to the ED with CC of SOB     Per chart review, pt was recently admitted to [facility] as a transfer.  He was started on a Lasix gtt and did well.  Started on gDMT and discharged home on  5 with plans to follow up in [facility] clinic for ongoing transplant evaluation at another facility.  He says that about a few days ago he started to notice LE swelling.  This turned into Nelson and orthopnea.  He says he can't walk to the bathroom without getting SOB.  He also complains of weight gain.  He takes torsemide 20mg BID at home and was told to trial additional Lasix which he did without any improvement.  He was rx metolazone but has not been filled.  He came to the ED     In the ED he was AF with stable VS on RA.  CBC showing chronic anemia.  CMP notable for acute on chronic CKD with baseline around 2.1 and Cr now 2.6.  BNP elevated.  Lactate neg.  CXR showing pulmonary edema.  I evaluated the pt at the bedside.  Bedside TTE showing CVP >15.  He was subsequently admitted to the CCU for diuresis.      He was continued on his home  and was started on a lasix gtt, which he responded well to overnight (net -1700cc. He feels much better this morning as well. Our transplant coordinators have been working on insurance approval and he is now being transferred to [facility] service for transplant evaluation.     Pt in process of LVAD work-up

## 2023-11-17 NOTE — ASSESSMENT & PLAN NOTE
Assessment and Plan    1. Risks of Infection: Available serologies were reviewed. No unusual risks of infection or significant barriers to transplantation were identified from the infectious disease standpoint given the information available at this time.    - Acute infectious issues: None   - Pending serologies: Strongyloides IgG, CMV, toxoplasma   - Please call if any pending serologic testing is positive.    2. Immunizations:  Based on the patient's immunization history and serologies, the following immunizations are recommended:  - Hepatitis A    Patient does not have immunity to hepatitis A    Vaccination ordered today: Yes   - Hepatitis B    Patient does have immunity to hepatitis B    Vaccination ordered today: Yes (2nd dose)   - COVID    Current CDC vaccination recommendations were discussed with the patient   - Annual high dose influenza     Vaccination ordered today: No. Reason for not ordering: vaccination up to date   - Prevnar 20    Vaccination ordered today: No. Reason for not ordering: vaccination up to date   - Tdap    Vaccination ordered today: No. Reason for not ordering: vaccination up to date   - Shingrix    Vaccination ordered today: No. Reason for not ordering: not available inpatient  (needs 2nd dose)    Recommended Pre-Transplant Immunization Schedule   Vaccine  0m 1m 2m 6m   Pneumococcal conjugate vaccine (Prevnar 20) X      Tetanus-diphtheria-pertussis (Tdap)* X      Hepatitis A Vaccine (Havrix)** X   X   Hepatitis B Vaccine (Heplisav)** X X     Influenza (annual) X      Zoster Recombinant Vaccine (Shingrix) X  X           *Administer booster every 10 years.       **Administer if no immunity demonstrated on serologies               Patient will receive vaccines while inpatient, and should be provided with a prescription to receive all subsequent vaccine does at local pharmacy or should visit his PCP.     3. Counseling:   I discussed with the patient the risk for increased susceptibility to  infections following transplantation including increased risk for infection right after transplant and if rejection should occur.  The patient has been counseled on the importance of vaccinations to decrease risk of infection and severe illness. Specific guidance has been provided to the patient regarding the patient's occupation, hobbies and activities to avoid future infectious complications.     4. Transplant Candidacy: Based on available information, there are no identified significant barriers to transplantation from an infectious disease standpoint.  Final determination of transplant candidacy will be made once evaluation is complete and reviewed by the Selection Committee.      Follow up with infectious disease as needed.

## 2023-11-17 NOTE — ASSESSMENT & PLAN NOTE
Pt with known ICM with an EF of 25% on home  presenting with decompensated HF.  Not in cardiogenic shock    [facility] 11/14: RA 14, PA 70/35, PCWP 32, CO 4.1, CI 2.1.     - Home  gtt at 5  - home GDMT: entresto 24/26mg BID (on hold 2/2 IVÁN), Hydralazine 25 q8h being continued  - home diuretics: Was taking lasix 40 bid  - CVP 5 this AM. Monitor CVP and sCr trend.  - CTS to follow regarding potential need for upgraded support to temporary MCS as bridge to advanced therapies.l   - Continue VAD/OHTx workup. On step 4  -Blood type A+  -PT/OT and nutrition   -Continue current support with  5, Ketty, and Lasix gtt @10/mg/hr.

## 2023-11-17 NOTE — ASSESSMENT & PLAN NOTE
Lab Results   Component Value Date    CREATININE 3.0 (H) 11/17/2023     Avoid insulin stacking  Titrate insulin slowly

## 2023-11-17 NOTE — SUBJECTIVE & OBJECTIVE
Interval History: pt started on Ketty overnight for rise in SrCr. Net negative even this AM. Complaints of severe goodman pain, so removed.  SrCr down to 3.0 today. Continues on  5, 10 Ketty, and Lasix 10mg/hr.     CVP 6  SvO2 55%  CO 6.2  CI 3.1        Continuous Infusions:   sodium chloride 0.9%      DOBUTamine 5 mcg/kg/min (11/17/23 1101)    furosemide (LASIX) 500 mg in 50 mL infusion (conc: 10 mg/mL) 5 mg/hr (11/17/23 1101)    heparin (porcine) in D5W 16 Units/kg/hr (11/17/23 1101)    nitric oxide gas       Scheduled Meds:   amiodarone  400 mg Oral Daily    aspirin  81 mg Oral Daily    atorvastatin  40 mg Oral Daily    hydrALAZINE  25 mg Oral Q8H    insulin aspart U-100  9 Units Subcutaneous TIDWM    magnesium oxide  400 mg Oral Daily    multivitamin  1 tablet Oral Daily    pantoprazole  40 mg Oral Before breakfast    senna-docusate 8.6-50 mg  2 tablet Oral BID     PRN Meds:sodium chloride 0.9%, acetaminophen, dextrose 10%, dextrose 10%, gabapentin, glucagon (human recombinant), glucose, glucose, heparin (PORCINE), heparin (PORCINE), insulin aspart U-100    Review of patient's allergies indicates:  No Known Allergies  Objective:     Vital Signs (Most Recent):  Temp: 98.2 °F (36.8 °C) (11/17/23 1101)  Pulse: 82 (11/17/23 1101)  Resp: (!) 21 (11/17/23 1101)  BP: (!) 99/58 (11/17/23 1101)  SpO2: 100 % (11/17/23 1101) Vital Signs (24h Range):  Temp:  [97.8 °F (36.6 °C)-98.7 °F (37.1 °C)] 98.2 °F (36.8 °C)  Pulse:  [80-90] 82  Resp:  [10-28] 21  SpO2:  [99 %-100 %] 100 %  BP: ()/(52-72) 99/58     Patient Vitals for the past 72 hrs (Last 3 readings):   Weight   11/17/23 0400 76 kg (167 lb 8.8 oz)       Body mass index is 22.72 kg/m².      Intake/Output Summary (Last 24 hours) at 11/17/2023 1136  Last data filed at 11/17/2023 1101  Gross per 24 hour   Intake 1653.76 ml   Output 1350 ml   Net 303.76 ml         Hemodynamic Parameters:            Physical Exam  Constitutional:       Comments: Alert, Oriented, No  "acute distress   HENT:      Head: Normocephalic and atraumatic.   Eyes:      Conjunctiva/sclera: Conjunctivae normal.   Neck:      Vascular: No hepatojugular reflux or JVD.      Comments: JVP does not appear elevated.   Cardiovascular:      Rate and Rhythm: Normal rate and regular rhythm.   Pulmonary:      Comments: Normal effort, Clear to auscultation   Abdominal:      Comments: Soft, Non-tender, Non-distended   Musculoskeletal:      Cervical back: Normal range of motion.      Right lower leg: No edema.      Left lower leg: No edema.      Comments: No peripheral edema   Skin:     Comments: Dry, Intact, Warm, Capillary refill <2 seconds.    Neurological:      Mental Status: He is alert and oriented to person, place, and time.      Comments: Normal speech   Psychiatric:         Mood and Affect: Mood and affect normal.            Significant Labs:  CBC:  Recent Labs   Lab 11/15/23  0401 11/16/23  0436 11/17/23  0405   WBC 7.02 7.21 6.30   RBC 2.77* 2.70* 2.57*   HGB 8.0* 7.8* 7.4*   HCT 24.9* 23.9* 22.7*    257 263   MCV 90 89 88   MCH 28.9 28.9 28.8   MCHC 32.1 32.6 32.6       BNP:  No results for input(s): "BNP" in the last 168 hours.    Invalid input(s): "BNPTRIAGELBLO"    CMP:  Recent Labs   Lab 11/10/23  1816 11/11/23  0508 11/16/23  0436 11/16/23  1340 11/17/23  0405   *   < > 157* 328* 135*   CALCIUM 9.2   < > 9.1 8.9 8.9   ALBUMIN 3.8  --   --   --   --    PROT 7.3  --   --   --   --       < > 133* 132* 132*   K 4.8   < > 4.0 4.3 3.9   CO2 21*   < > 23 22* 20*      < > 97 98 97   BUN 57*   < > 63* 64* 68*   CREATININE 2.5*   < > 3.0* 3.3* 3.0*   ALKPHOS 89  --   --   --   --    ALT 37  --   --   --   --    AST 19  --   --   --   --    BILITOT 1.0  --   --   --   --     < > = values in this interval not displayed.        Coagulation:   Recent Labs   Lab 11/11/23  0958 11/11/23  1600 11/15/23  0401 11/15/23  1601 11/16/23  0436 11/17/23  0405   INR 1.2  --   --  1.1  --   --    APTT " 31.3   < > 49.4*  --  56.0* 58.1*    < > = values in this interval not displayed.       LDH:  Recent Labs   Lab 11/15/23  1601          Microbiology:  Microbiology Results (last 7 days)       ** No results found for the last 168 hours. **            I have reviewed all pertinent labs within the past 24 hours.    Estimated Creatinine Clearance: 27.8 mL/min (A) (based on SCr of 3 mg/dL (H)).    Diagnostic Results:  I have reviewed all pertinent imaging results/findings within the past 24 hours.

## 2023-11-17 NOTE — SUBJECTIVE & OBJECTIVE
Past Medical History:   Diagnosis Date    CAD (coronary artery disease)     Diabetes mellitus     HFrEF (heart failure with reduced ejection fraction)     ICD (implantable cardioverter-defibrillator) in place     MI, old        Past Surgical History:   Procedure Laterality Date    CARDIAC SURGERY      RIGHT HEART CATHETERIZATION Right 10/10/2023    Procedure: INSERTION, CATHETER, RIGHT HEART;  Surgeon: Bin Gandhi MD;  Location: [facility] CATH LAB;  Service: Cardiology;  Laterality: Right;    RIGHT HEART CATHETERIZATION Right 10/13/2023    Procedure: INSERTION, CATHETER, RIGHT HEART;  Surgeon: Walter Mcintyre MD;  Location: [facility] CATH LAB;  Service: Cardiology;  Laterality: Right;    RIGHT HEART CATHETERIZATION  11/13/2023    RIGHT HEART CATHETERIZATION Right 11/13/2023    Procedure: INSERTION, CATHETER, RIGHT HEART;  Surgeon: Juventino Bermudez Jr., MD;  Location: [facility] CATH LAB;  Service: Cardiology;  Laterality: Right;       Review of patient's allergies indicates:  No Known Allergies    Medications:  Medications Prior to Admission   Medication Sig    amiodarone (PACERONE) 400 MG tablet Take 1 tablet (400 mg total) by mouth once daily.    apixaban (ELIQUIS) 5 mg Tab Take 1 tablet (5 mg total) by mouth 2 (two) times daily.    aspirin (ECOTRIN) 81 MG EC tablet Take 81 mg by mouth once daily.    atorvastatin (LIPITOR) 40 MG tablet Take 40 mg by mouth.    DOBUTamine (DOBUTREX) 1,000 mg/250 mL (4,000 mcg/mL) infusion Inject 389.5 mcg/min into the vein continuous.    fish oil-omega-3 fatty acids 300-1,000 mg capsule Take 2 g by mouth once daily.    furosemide (LASIX) 40 MG tablet Take 1 tablet (40 mg total) by mouth 2 (two) times daily.    gabapentin (NEURONTIN) 300 MG capsule Take 300 mg by mouth nightly as needed.    multivitamin capsule Take 1 capsule by mouth once daily.    pantoprazole (PROTONIX) 40 MG tablet Take 1 tablet (40 mg total) by mouth before breakfast.    potassium chloride SA  (K-DUR,KLOR-CON) 20 MEQ tablet Take 1 tablet (20 mEq total) by mouth once daily.    sacubitriL-valsartan (ENTRESTO) 24-26 mg per tablet Take 1 tablet by mouth 2 (two) times daily.    metOLazone (ZAROXOLYN) 2.5 MG tablet Take 1 tablet (2.5 mg total) by mouth once daily. Thursday 11/9 and Saturday 11/10    torsemide (DEMADEX) 20 MG Tab Take 2 tablets (40 mg total) by mouth 2 (two) times a day.     Antibiotics (From admission, onward)      None          Antifungals (From admission, onward)      None          Antivirals (From admission, onward)      None             Immunization History   Administered Date(s) Administered    COVID-19, MRNA, LN-S, PF (MODERNA FULL 0.5 ML DOSE) 03/18/2021, 04/15/2021       Family History    None       Social History     Socioeconomic History    Marital status:    Tobacco Use    Smoking status: Every Day     Current packs/day: 0.50     Types: Cigarettes   Substance and Sexual Activity    Alcohol use: Yes     Comment: rarely    Drug use: No     Social Determinants of Health     Financial Resource Strain: Low Risk  (10/27/2023)    Overall Financial Resource Strain (CARDIA)     Difficulty of Paying Living Expenses: Not hard at all   Food Insecurity: No Food Insecurity (10/27/2023)    Hunger Vital Sign     Worried About Running Out of Food in the Last Year: Never true     Ran Out of Food in the Last Year: Never true   Transportation Needs: No Transportation Needs (10/27/2023)    PRAPARE - Transportation     Lack of Transportation (Medical): No     Lack of Transportation (Non-Medical): No   Physical Activity: Inactive (10/27/2023)    Exercise Vital Sign     Days of Exercise per Week: 0 days     Minutes of Exercise per Session: 0 min   Stress: No Stress Concern Present (10/27/2023)    [employer] [place] [employer] - Occupational Stress Questionnaire     Feeling of Stress : Not at all   Social Connections: Moderately Integrated (10/27/2023)    Social Connection  and Isolation Panel [NHANES]     Frequency of Communication with Friends and Family: More than three times a week     Frequency of Social Gatherings with Friends and Family: More than three times a week     Attends Rastafarian Services: More than 4 times per year     Active Member of Clubs or Organizations: No     Attends Club or Organization Meetings: Never     Marital Status:    Housing Stability: Low Risk  (10/27/2023)    Housing Stability Vital Sign     Unable to Pay for Housing in the Last Year: No     Number of Places Lived in the Last Year: 1     Unstable Housing in the Last Year: No     Review of Systems   Constitutional:  Negative for chills and fever.   Respiratory:  Negative for cough and shortness of breath.    Cardiovascular:  Negative for chest pain.   Gastrointestinal:  Negative for abdominal pain, constipation, diarrhea, nausea and vomiting.   Skin:  Negative for rash.   Neurological:  Negative for headaches.     Objective:     Vital Signs (Most Recent):  Temp: 98.2 °F (36.8 °C) (11/17/23 1101)  Pulse: 85 (11/17/23 1301)  Resp: (!) 22 (11/17/23 1301)  BP: 100/68 (11/17/23 1301)  SpO2: 100 % (11/17/23 1301) Vital Signs (24h Range):  Temp:  [97.8 °F (36.6 °C)-98.7 °F (37.1 °C)] 98.2 °F (36.8 °C)  Pulse:  [80-90] 85  Resp:  [10-28] 22  SpO2:  [99 %-100 %] 100 %  BP: ()/(52-72) 100/68     Weight: 76 kg (167 lb 8.8 oz)  Body mass index is 22.72 kg/m².    Estimated Creatinine Clearance: 27.8 mL/min (A) (based on SCr of 3 mg/dL (H)).     Physical Exam  Vitals reviewed.   Constitutional:       General: He is not in acute distress.     Appearance: Normal appearance. He is not ill-appearing.   HENT:      Head: Normocephalic and atraumatic.   Eyes:      Extraocular Movements: Extraocular movements intact.      Conjunctiva/sclera: Conjunctivae normal.   Cardiovascular:      Rate and Rhythm: Normal rate and regular rhythm.      Heart sounds: No murmur heard.  Pulmonary:      Effort: Pulmonary  effort is normal. No respiratory distress.      Breath sounds: Normal breath sounds.   Abdominal:      General: Abdomen is flat. Bowel sounds are normal.      Palpations: Abdomen is soft.      Tenderness: There is no abdominal tenderness.   Musculoskeletal:      Cervical back: Normal range of motion.   Skin:     General: Skin is warm and dry.   Neurological:      General: No focal deficit present.      Mental Status: He is alert and oriented to person, place, and time.   Psychiatric:         Mood and Affect: Mood normal.         Behavior: Behavior normal.         Thought Content: Thought content normal.          Significant Labs: All pertinent labs within the past 24 hours have been reviewed.  Recent Lab Results  (Last 5 results in the past 24 hours)        11/17/23  1257   11/17/23  0811   11/17/23  0806   11/17/23  0431   11/17/23  0416        Performed By:         BETH       POC MetHb         0.3       Specimen source         Venous       Allens Test     N/A   N/A         BIPAP         0       Site     Other   Other         DelSys       Nasal Can         FiO2         36.0       LPM         4       POC BE     0   -1         POC HCO3     24.0   23.7         POC PCO2     36.8   36.1         POC PH     7.422   7.425         POC PO2     28   32         POC SATURATED O2     55   64         POC TCO2     25   25         POC Temp         37.0       POCT Glucose 197   169             Sample     VENOUS   VENOUS                                Significant Imaging: I have reviewed all pertinent imaging results/findings within the past 24 hours.

## 2023-11-17 NOTE — SUBJECTIVE & OBJECTIVE
Interval HPI:   No acute events overnight. Patient in room [id number]/[id number] A. Blood glucose stable. BG at and above goal on current insulin regimen (SSI and prandial insulin). Steroid use- None .   3 Days Post-Op  Renal function- Abnormal - Cr 3.0   Vasopressors-  None      Diet NPO Except for: Medication      Eating:   NPO  Nausea: No  Hypoglycemia and intervention: No  Fever: No  TPN and/or TF: No    /66   Pulse 81   Temp 98.2 °F (36.8 °C) (Oral)   Resp (!) 21   Ht 6' (1.829 m)   Wt 76 kg (167 lb 8.8 oz)   SpO2 100%   BMI 22.72 kg/m²     Labs Reviewed and Include    Recent Labs   Lab 11/17/23  0405   *   CALCIUM 8.9   *   K 3.9   CO2 20*   CL 97   BUN 68*   CREATININE 3.0*     Lab Results   Component Value Date    WBC 6.30 11/17/2023    HGB 7.4 (L) 11/17/2023    HCT 22.7 (L) 11/17/2023    MCV 88 11/17/2023     11/17/2023     Recent Labs   Lab 11/15/23  1720   TSH 1.381     Lab Results   Component Value Date    HGBA1C 7.6 (H) 10/12/2023       Nutritional status:   Body mass index is 22.72 kg/m².  Lab Results   Component Value Date    ALBUMIN 3.8 11/10/2023    ALBUMIN 3.7 11/09/2023    ALBUMIN 3.1 (L) 11/01/2023     Lab Results   Component Value Date    PREALBUMIN 27 11/15/2023       Estimated Creatinine Clearance: 27.8 mL/min (A) (based on SCr of 3 mg/dL (H)).    Accu-Checks  Recent Labs     11/14/23  2123 11/15/23  0746 11/15/23  1207 11/15/23  1629 11/16/23  0755 11/16/23  1048 11/16/23  1213 11/16/23  1628 11/16/23  2207 11/17/23  0811   POCTGLUCOSE 253* 147* 214* 216* 173* 179* 314* 213* 156* 169*       Current Medications and/or Treatments Impacting Glycemic Control  Immunotherapy:    Immunosuppressants       None          Steroids:   Hormones (From admission, onward)      None          Pressors:    Autonomic Drugs (From admission, onward)      None          Hyperglycemia/Diabetes Medications:   Antihyperglycemics (From admission, onward)      Start     Stop Route Frequency  Ordered    11/16/23 1645  insulin aspart U-100 pen 9 Units         -- SubQ 3 times daily with meals 11/16/23 1138    11/10/23 1602  insulin aspart U-100 pen 0-5 Units         -- SubQ Before meals & nightly PRN 11/10/23 1501

## 2023-11-17 NOTE — NURSING
Rounded on patient for VAD education. Patient is up in chair, eating lunch with wife. Will revisit this afternoon to complete VAD education with patient and wife.

## 2023-11-17 NOTE — ASSESSMENT & PLAN NOTE
Impression: Pt is a 64 y/o male with advanced heart failure. Pt is in process of LVAD work-up. Pt is sitting up in chair after ambulating with PT in mora. Pt is AAOx4. Pt is full code.     Reason for consult: LVAD work-up    Discussion conducted with the pt who reported his goals for health care for getting an LVAD is to live longer, better QOL, and hopefully be bridge to transplant.   The pts chief concern/fear reported pertaining to receiving an LVAD and the impact on his/her life was getting used to living with LVAD.     The need for preparedness planning was discussed with special attention and in reference to:  a) device failure b) suboptimal QOL post LVAD procedure, c) impact on co- morbid conditions, and d) catastrophic complications such as stroke, renal failure/hemodialysis or other chronic critical illness. In particular, the following points should be noted in the event of:  Device failure:Pt aware this is a complication.   a) Post LVAD QOL goals are: better QOL, feel better, bridge to transplant if possible.   ________________________________________________________________   b) Chronic poor QOL is defined as:feeling bad all the time._        3)   Catastrophic Complications: DIscussed. Pt has gotten his education form LVAD nurses. Pt and wife aware of drive-line infection that can lead to sepsis, head bleed, GI bleed, stroke etc.    Pt and pt's wife to discuss his wishes if a catastrophic complication occurs.   Pt wants his wife who is legal NOK to be medical decision -maker. MPOA paperwork given to pt. He will look over.     During the discussion the pt/(caregiver) demonstrated __excellent __X_good   __marginal__poor insight/understanding concerning the risk vs benefits of obtaining an LVAD

## 2023-11-17 NOTE — SUBJECTIVE & OBJECTIVE
Interval History: Pt is process of LVAD work-up.     Past Medical History:   Diagnosis Date    CAD (coronary artery disease)     Diabetes mellitus     HFrEF (heart failure with reduced ejection fraction)     ICD (implantable cardioverter-defibrillator) in place     MI, old        Past Surgical History:   Procedure Laterality Date    CARDIAC SURGERY      RIGHT HEART CATHETERIZATION Right 10/10/2023    Procedure: INSERTION, CATHETER, RIGHT HEART;  Surgeon: Bin Gandhi MD;  Location: [facility] CATH LAB;  Service: Cardiology;  Laterality: Right;    RIGHT HEART CATHETERIZATION Right 10/13/2023    Procedure: INSERTION, CATHETER, RIGHT HEART;  Surgeon: Walter Mcintyre MD;  Location: [facility] CATH LAB;  Service: Cardiology;  Laterality: Right;    RIGHT HEART CATHETERIZATION  11/13/2023    RIGHT HEART CATHETERIZATION Right 11/13/2023    Procedure: INSERTION, CATHETER, RIGHT HEART;  Surgeon: Juventino Bermudez Jr., MD;  Location: [facility] CATH LAB;  Service: Cardiology;  Laterality: Right;       Review of patient's allergies indicates:  No Known Allergies    Medications:  Continuous Infusions:   sodium chloride 0.9%      DOBUTamine 5 mcg/kg/min (11/17/23 1301)    furosemide (LASIX) 500 mg in 50 mL infusion (conc: 10 mg/mL) 5 mg/hr (11/17/23 1301)    heparin (porcine) in D5W 16 Units/kg/hr (11/17/23 1301)    nitric oxide gas       Scheduled Meds:   amiodarone  400 mg Oral Daily    aspirin  81 mg Oral Daily    atorvastatin  40 mg Oral Daily    hydrALAZINE  25 mg Oral Q8H    insulin aspart U-100  9 Units Subcutaneous TIDWM    magnesium oxide  400 mg Oral Daily    multivitamin  1 tablet Oral Daily    pantoprazole  40 mg Oral Before breakfast    senna-docusate 8.6-50 mg  2 tablet Oral BID     PRN Meds:sodium chloride 0.9%, acetaminophen, dextrose 10%, dextrose 10%, gabapentin, glucagon (human recombinant), glucose, glucose, heparin (PORCINE), heparin (PORCINE), insulin aspart U-100    Family History    None       Tobacco Use    Smoking  status: Every Day     Current packs/day: 0.50     Types: Cigarettes    Smokeless tobacco: Not on file   Substance and Sexual Activity    Alcohol use: Yes     Comment: rarely    Drug use: No    Sexual activity: Not on file       Review of Systems   Constitutional:  Positive for activity change.   Respiratory:  Negative for shortness of breath.    Neurological:  Positive for weakness.   Psychiatric/Behavioral: Negative.       Objective:     Vital Signs (Most Recent):  Temp: 98.2 °F (36.8 °C) (11/17/23 1101)  Pulse: 85 (11/17/23 1301)  Resp: (!) 22 (11/17/23 1301)  BP: 100/68 (11/17/23 1301)  SpO2: 100 % (11/17/23 1301) Vital Signs (24h Range):  Temp:  [97.8 °F (36.6 °C)-98.7 °F (37.1 °C)] 98.2 °F (36.8 °C)  Pulse:  [80-90] 85  Resp:  [10-28] 22  SpO2:  [99 %-100 %] 100 %  BP: ()/(52-72) 100/68     Weight: 76 kg (167 lb 8.8 oz)  Body mass index is 22.72 kg/m².       Physical Exam  HENT:      Head: Normocephalic and atraumatic.   Cardiovascular:      Comments: Pt on Dobutamine and Lasix drip.   Pulmonary:      Effort: No respiratory distress.   Musculoskeletal:      Comments: Pt walked with PT In mora. Weakness noted.    Neurological:      Mental Status: He is alert and oriented to person, place, and time.   Psychiatric:         Attention and Perception: Attention normal.         Mood and Affect: Mood normal.         Speech: Speech normal.         Behavior: Behavior is cooperative.            Review of Symptoms      Symptom Assessment (ESAS 0-10 Scale)  Pain:  0  Dyspnea:  0  Anxiety:  0  Nausea:  0  Depression:  0  Anorexia:  0  Fatigue:  0  Insomnia:  0  Restlessness:  0  Agitation:  0         Living Arrangements:  Lives with spouse    Psychosocial/Cultural:   See Palliative Psychosocial Note: No  Pt lives with Spouse. She will be main care-giver.   **Primary  to Follow**  Palliative Care  Consult: No    Spiritual:  F - Mavis and Belief:  Synagogue  A - Address in Care:  Pt has family  members who are pastors and make visits.         Advance Care Planning   Advance Directives:   Living Will: No    LaPOST: No    Do Not Resuscitate Status: No    Medical Power of : No    Agent's Name:  Pt's wife, Arlyn is NOK    Decision Making:  Patient answered questions and Family answered questions  Goals of Care: The patient endorses that what is most important right now is continuing medical management in hopes of LVAD as bridge to transplant.          Significant Labs: All pertinent labs within the past 24 hours have been reviewed.  CBC:   Recent Labs   Lab 11/17/23  0405   WBC 6.30   HGB 7.4*   HCT 22.7*   MCV 88        BMP:  Recent Labs   Lab 11/17/23 0405   *   *   K 3.9   CL 97   CO2 20*   BUN 68*   CREATININE 3.0*   CALCIUM 8.9   MG 2.5     LFT:  Lab Results   Component Value Date    AST 19 11/10/2023    ALKPHOS 89 11/10/2023    BILITOT 1.0 11/10/2023     Albumin:   Albumin   Date Value Ref Range Status   11/10/2023 3.8 3.5 - 5.2 g/dL Final     Protein:   Total Protein   Date Value Ref Range Status   11/10/2023 7.3 6.0 - 8.4 g/dL Final     Lactic acid:   Lab Results   Component Value Date    LACTATE 1.7 11/12/2023    LACTATE 1.0 10/26/2023       Significant Imaging: I have reviewed all pertinent imaging results/findings within the past 24 hours.

## 2023-11-17 NOTE — CONSULTS
[facility] - Cardiac Intensive Care  Palliative Medicine  Consult Note    Patient Name: Radha Abbott  MRN: [id number]  Admission Date: 11/9/2023  Hospital Length of Stay: 8 days  Code Status: Full Code   Attending Provider: Sandhya Price MD  Consulting Provider: ROHIT Gibson  Primary Care Physician: Vasu Kong MD  Principal Problem:Acute on chronic combined systolic and diastolic CHF, NYHA class 4    Patient information was obtained from spouse/SO and primary team.      Inpatient consult to Palliative Care  Consult performed by: Cynthia Lorenzana CNS  Consult ordered by: Sandhya Price MD        Assessment/Plan:     Palliative Care  Palliative care encounter  Impression: Pt is a 62 y/o male with advanced heart failure. Pt is in process of LVAD work-up. Pt is sitting up in chair after ambulating with PT in abbott. Pt is AAOx4. Pt is full code.     Reason for consult: LVAD work-up    Discussion conducted with the pt who reported his goals for health care for getting an LVAD is to live longer, better QOL, and hopefully be bridge to transplant.   The pts chief concern/fear reported pertaining to receiving an LVAD and the impact on his/her life was getting used to living with LVAD.     The need for preparedness planning was discussed with special attention and in reference to:  a) device failure b) suboptimal QOL post LVAD procedure, c) impact on co- morbid conditions, and d) catastrophic complications such as stroke, renal failure/hemodialysis or other chronic critical illness. In particular, the following points should be noted in the event of:  Device failure:Pt aware this is a complication.   a) Post LVAD QOL goals are: better QOL, feel better, bridge to transplant if possible.   ________________________________________________________________   b) Chronic poor QOL is defined as:feeling bad all the time._        3)   Catastrophic Complications: DIscussed. Pt has gotten his education form LVAD  nurses. Pt and wife aware of drive-line infection that can lead to sepsis, head bleed, GI bleed, stroke etc.    Pt and pt's wife to discuss his wishes if a catastrophic complication occurs.   Pt wants his wife who is legal NOK to be medical decision -maker. MPOA paperwork given to pt. He will look over.     During the discussion the pt/(caregiver) demonstrated __excellent __X_good   __marginal__poor insight/understanding concerning the risk vs benefits of obtaining an LVAD          Thank you for your consult. I will sign off. Please contact us if you have any additional questions.    Subjective:     HPI:   Pt is a 63 year old male with hx of CAD s/p 3v CABG (unclear anatomy, 2009), ICM with a recent EF of 15-20% s/p ICD (medtronchristiano 2009), DM2 (a1c 7.7), HTN, HLD, Vfib on amio who presents to the ED with CC of SOB     Per chart review, pt was recently admitted to [facility] as a transfer.  He was started on a Lasix gtt and did well.  Started on gDMT and discharged home on  5 with plans to follow up in HTS clinic for ongoing transplant evaluation at another facility.  He says that about a few days ago he started to notice LE swelling.  This turned into Nelson and orthopnea.  He says he can't walk to the bathroom without getting SOB.  He also complains of weight gain.  He takes torsemide 20mg BID at home and was told to trial additional Lasix which he did without any improvement.  He was rx metolazone but has not been filled.  He came to the ED     In the ED he was AF with stable VS on RA.  CBC showing chronic anemia.  CMP notable for acute on chronic CKD with baseline around 2.1 and Cr now 2.6.  BNP elevated.  Lactate neg.  CXR showing pulmonary edema.  I evaluated the pt at the bedside.  Bedside TTE showing CVP >15.  He was subsequently admitted to the CCU for diuresis.      He was continued on his home  and was started on a lasix gtt, which he responded well to overnight (net -1700cc. He feels much better this morning as  well. Our transplant coordinators have been working on insurance approval and he is now being transferred to [facility] service for transplant evaluation.     Pt in process of LVAD work-up       Hospital Course:  No notes on file    Interval History: Pt is process of LVAD work-up.     Past Medical History:   Diagnosis Date    CAD (coronary artery disease)     Diabetes mellitus     HFrEF (heart failure with reduced ejection fraction)     ICD (implantable cardioverter-defibrillator) in place     MI, old        Past Surgical History:   Procedure Laterality Date    CARDIAC SURGERY      RIGHT HEART CATHETERIZATION Right 10/10/2023    Procedure: INSERTION, CATHETER, RIGHT HEART;  Surgeon: Bin Gandhi MD;  Location: [facility] CATH LAB;  Service: Cardiology;  Laterality: Right;    RIGHT HEART CATHETERIZATION Right 10/13/2023    Procedure: INSERTION, CATHETER, RIGHT HEART;  Surgeon: Walter cMintyre MD;  Location: [facility] CATH LAB;  Service: Cardiology;  Laterality: Right;    RIGHT HEART CATHETERIZATION  11/13/2023    RIGHT HEART CATHETERIZATION Right 11/13/2023    Procedure: INSERTION, CATHETER, RIGHT HEART;  Surgeon: Juventino Bermudez Jr., MD;  Location: [facility] CATH LAB;  Service: Cardiology;  Laterality: Right;       Review of patient's allergies indicates:  No Known Allergies    Medications:  Continuous Infusions:   sodium chloride 0.9%      DOBUTamine 5 mcg/kg/min (11/17/23 1301)    furosemide (LASIX) 500 mg in 50 mL infusion (conc: 10 mg/mL) 5 mg/hr (11/17/23 1301)    heparin (porcine) in D5W 16 Units/kg/hr (11/17/23 1301)    nitric oxide gas       Scheduled Meds:   amiodarone  400 mg Oral Daily    aspirin  81 mg Oral Daily    atorvastatin  40 mg Oral Daily    hydrALAZINE  25 mg Oral Q8H    insulin aspart U-100  9 Units Subcutaneous TIDWM    magnesium oxide  400 mg Oral Daily    multivitamin  1 tablet Oral Daily    pantoprazole  40 mg Oral Before breakfast    senna-docusate 8.6-50 mg  2 tablet Oral BID     PRN Meds:sodium  chloride 0.9%, acetaminophen, dextrose 10%, dextrose 10%, gabapentin, glucagon (human recombinant), glucose, glucose, heparin (PORCINE), heparin (PORCINE), insulin aspart U-100    Family History    None       Tobacco Use    Smoking status: Every Day     Current packs/day: 0.50     Types: Cigarettes    Smokeless tobacco: Not on file   Substance and Sexual Activity    Alcohol use: Yes     Comment: rarely    Drug use: No    Sexual activity: Not on file       Review of Systems   Constitutional:  Positive for activity change.   Respiratory:  Negative for shortness of breath.    Neurological:  Positive for weakness.   Psychiatric/Behavioral: Negative.       Objective:     Vital Signs (Most Recent):  Temp: 98.2 °F (36.8 °C) (11/17/23 1101)  Pulse: 85 (11/17/23 1301)  Resp: (!) 22 (11/17/23 1301)  BP: 100/68 (11/17/23 1301)  SpO2: 100 % (11/17/23 1301) Vital Signs (24h Range):  Temp:  [97.8 °F (36.6 °C)-98.7 °F (37.1 °C)] 98.2 °F (36.8 °C)  Pulse:  [80-90] 85  Resp:  [10-28] 22  SpO2:  [99 %-100 %] 100 %  BP: ()/(52-72) 100/68     Weight: 76 kg (167 lb 8.8 oz)  Body mass index is 22.72 kg/m².       Physical Exam  HENT:      Head: Normocephalic and atraumatic.   Cardiovascular:      Comments: Pt on Dobutamine and Lasix drip.   Pulmonary:      Effort: No respiratory distress.   Musculoskeletal:      Comments: Pt walked with PT In mora. Weakness noted.    Neurological:      Mental Status: He is alert and oriented to person, place, and time.   Psychiatric:         Attention and Perception: Attention normal.         Mood and Affect: Mood normal.         Speech: Speech normal.         Behavior: Behavior is cooperative.            Review of Symptoms      Symptom Assessment (ESAS 0-10 Scale)  Pain:  0  Dyspnea:  0  Anxiety:  0  Nausea:  0  Depression:  0  Anorexia:  0  Fatigue:  0  Insomnia:  0  Restlessness:  0  Agitation:  0         Living Arrangements:  Lives with spouse    Psychosocial/Cultural:   See Palliative  Psychosocial Note: No  Pt lives with Spouse. She will be main care-giver.   **Primary  to Follow**  Palliative Care  Consult: No    Spiritual:  F - Mavis and Belief:  Religion  A - Address in Care:  Pt has family members who are pastors and make visits.         Advance Care Planning  Advance Directives:   Living Will: No    LaPOST: No    Do Not Resuscitate Status: No    Medical Power of : No    Agent's Name:  Pt's wife, Arlyn is NOK    Decision Making:  Patient answered questions and Family answered questions  Goals of Care: The patient endorses that what is most important right now is continuing medical management in hopes of LVAD as bridge to transplant.          Significant Labs: All pertinent labs within the past 24 hours have been reviewed.  CBC:   Recent Labs   Lab 11/17/23  0405   WBC 6.30   HGB 7.4*   HCT 22.7*   MCV 88        BMP:  Recent Labs   Lab 11/17/23 0405   *   *   K 3.9   CL 97   CO2 20*   BUN 68*   CREATININE 3.0*   CALCIUM 8.9   MG 2.5     LFT:  Lab Results   Component Value Date    AST 19 11/10/2023    ALKPHOS 89 11/10/2023    BILITOT 1.0 11/10/2023     Albumin:   Albumin   Date Value Ref Range Status   11/10/2023 3.8 3.5 - 5.2 g/dL Final     Protein:   Total Protein   Date Value Ref Range Status   11/10/2023 7.3 6.0 - 8.4 g/dL Final     Lactic acid:   Lab Results   Component Value Date    LACTATE 1.7 11/12/2023    LACTATE 1.0 10/26/2023       Significant Imaging: I have reviewed all pertinent imaging results/findings within the past 24 hours.      I spent a total of 75 minutes on the day of the visit. This includes face to face time in discussion of goals of care, symptom assessment, coordination of care and emotional support.  This also includes non-face to face time preparing to see the patient (eg, review of tests/imaging), obtaining and/or reviewing separately obtained history, documenting clinical information in the electronic or  other health record, independently interpreting results and communicating results to the patient/family/caregiver, or care coordinator.    Cynthia Lorenzana, CNS  Palliative Medicine  [facility] - Cardiac Intensive Care

## 2023-11-18 ENCOUNTER — SOCIAL WORK (OUTPATIENT)
Dept: TRANSPLANT | Facility: CLINIC | Age: 63
End: 2023-11-18
Payer: MEDICAID

## 2023-11-18 LAB
ALBUMIN SERPL BCP-MCNC: 3.1 G/DL (ref 3.5–5.2)
ALLENS TEST: ABNORMAL
ALP SERPL-CCNC: 85 U/L (ref 55–135)
ALT SERPL W/O P-5'-P-CCNC: 16 U/L (ref 10–44)
ANION GAP SERPL CALC-SCNC: 13 MMOL/L (ref 8–16)
ANION GAP SERPL CALC-SCNC: 13 MMOL/L (ref 8–16)
APTT IMM NP PPP: 43 SEC (ref 32–48)
APTT P HEP NEUT PPP: 49 SEC (ref 32–48)
APTT PPP: 56.1 SEC (ref 21–32)
AST SERPL-CCNC: 13 U/L (ref 10–40)
BASOPHILS # BLD AUTO: 0.02 K/UL (ref 0–0.2)
BASOPHILS # BLD AUTO: 0.03 K/UL (ref 0–0.2)
BASOPHILS NFR BLD: 0.3 % (ref 0–1.9)
BASOPHILS NFR BLD: 0.6 % (ref 0–1.9)
BILIRUB SERPL-MCNC: 0.6 MG/DL (ref 0.1–1)
BIPAP: 0
BNP SERPL-MCNC: 859 PG/ML (ref 0–99)
BUN SERPL-MCNC: 74 MG/DL (ref 8–23)
BUN SERPL-MCNC: 77 MG/DL (ref 8–23)
CALCIUM SERPL-MCNC: 8.8 MG/DL (ref 8.7–10.5)
CALCIUM SERPL-MCNC: 9.2 MG/DL (ref 8.7–10.5)
CHLORIDE SERPL-SCNC: 93 MMOL/L (ref 95–110)
CHLORIDE SERPL-SCNC: 97 MMOL/L (ref 95–110)
CO2 SERPL-SCNC: 24 MMOL/L (ref 23–29)
CO2 SERPL-SCNC: 25 MMOL/L (ref 23–29)
CONFIRM APTT STACLOT: ABNORMAL
CREAT SERPL-MCNC: 3 MG/DL (ref 0.5–1.4)
CREAT SERPL-MCNC: 3.2 MG/DL (ref 0.5–1.4)
DELSYS: ABNORMAL
DIFFERENTIAL METHOD BLD: ABNORMAL
DIFFERENTIAL METHOD BLD: ABNORMAL
DRVVT SCREEN TO CONFIRM RATIO: ABNORMAL RATIO
EOSINOPHIL # BLD AUTO: 0.1 K/UL (ref 0–0.5)
EOSINOPHIL # BLD AUTO: 0.1 K/UL (ref 0–0.5)
EOSINOPHIL NFR BLD: 1.6 % (ref 0–8)
EOSINOPHIL NFR BLD: 1.7 % (ref 0–8)
ERYTHROCYTE [DISTWIDTH] IN BLOOD BY AUTOMATED COUNT: 14.6 % (ref 11.5–14.5)
ERYTHROCYTE [DISTWIDTH] IN BLOOD BY AUTOMATED COUNT: 14.7 % (ref 11.5–14.5)
EST. GFR  (NO RACE VARIABLE): 21.2 ML/MIN/1.73 M^2
EST. GFR  (NO RACE VARIABLE): 22.9 ML/MIN/1.73 M^2
FIO2: 36 %
GLUCOSE SERPL-MCNC: 152 MG/DL (ref 70–110)
GLUCOSE SERPL-MCNC: 277 MG/DL (ref 70–110)
HCO3 UR-SCNC: 24.2 MMOL/L (ref 24–28)
HCO3 UR-SCNC: 26 MMOL/L (ref 24–28)
HCO3 UR-SCNC: 26.4 MMOL/L (ref 24–28)
HCT VFR BLD AUTO: 21.7 % (ref 40–54)
HCT VFR BLD AUTO: 32.7 % (ref 40–54)
HGB BLD-MCNC: 10.6 G/DL (ref 14–18)
HGB BLD-MCNC: 7.1 G/DL (ref 14–18)
IMM GRANULOCYTES # BLD AUTO: 0.02 K/UL (ref 0–0.04)
IMM GRANULOCYTES # BLD AUTO: 0.03 K/UL (ref 0–0.04)
IMM GRANULOCYTES NFR BLD AUTO: 0.4 % (ref 0–0.5)
IMM GRANULOCYTES NFR BLD AUTO: 0.5 % (ref 0–0.5)
LA 3 SCREEN W REFLEX-IMP: ABNORMAL
LA APTT+DRVVT+PT W REFLEX PPP: ABNORMAL
LABORATORY COMMENT REPORT: NORMAL
LABORATORY COMMENT REPORT: NORMAL
LPM: 4
LYMPHOCYTES # BLD AUTO: 0.4 K/UL (ref 1–4.8)
LYMPHOCYTES # BLD AUTO: 0.8 K/UL (ref 1–4.8)
LYMPHOCYTES NFR BLD: 13.2 % (ref 18–48)
LYMPHOCYTES NFR BLD: 7.3 % (ref 18–48)
MAGNESIUM SERPL-MCNC: 2.5 MG/DL (ref 1.6–2.6)
MCH RBC QN AUTO: 28.8 PG (ref 27–31)
MCH RBC QN AUTO: 29.3 PG (ref 27–31)
MCHC RBC AUTO-ENTMCNC: 32.4 G/DL (ref 32–36)
MCHC RBC AUTO-ENTMCNC: 32.7 G/DL (ref 32–36)
MCV RBC AUTO: 89 FL (ref 82–98)
MCV RBC AUTO: 90 FL (ref 82–98)
METHEMOGLOBIN: 0.5 % (ref 0–3)
MIXING DRVVT: ABNORMAL SEC (ref 33–44)
MONOCYTES # BLD AUTO: 0.3 K/UL (ref 0.3–1)
MONOCYTES # BLD AUTO: 0.4 K/UL (ref 0.3–1)
MONOCYTES NFR BLD: 5.8 % (ref 4–15)
MONOCYTES NFR BLD: 6 % (ref 4–15)
NEUTROPHILS # BLD AUTO: 4.2 K/UL (ref 1.8–7.7)
NEUTROPHILS # BLD AUTO: 4.7 K/UL (ref 1.8–7.7)
NEUTROPHILS NFR BLD: 78.5 % (ref 38–73)
NEUTROPHILS NFR BLD: 84.1 % (ref 38–73)
NRBC BLD-RTO: 0 /100 WBC
NRBC BLD-RTO: 0 /100 WBC
PCO2 BLDA: 39.1 MMHG (ref 35–45)
PCO2 BLDA: 40 MMHG (ref 35–45)
PCO2 BLDA: 40.6 MMHG (ref 35–45)
PH SMN: 7.4 [PH] (ref 7.35–7.45)
PH SMN: 7.42 [PH] (ref 7.35–7.45)
PH SMN: 7.42 [PH] (ref 7.35–7.45)
PHOSPHATE SERPL-MCNC: 3.8 MG/DL (ref 2.7–4.5)
PLATELET # BLD AUTO: 234 K/UL (ref 150–450)
PLATELET # BLD AUTO: 260 K/UL (ref 150–450)
PMV BLD AUTO: 10.4 FL (ref 9.2–12.9)
PMV BLD AUTO: 11.3 FL (ref 9.2–12.9)
PO2 BLDA: 25 MMHG (ref 40–60)
PO2 BLDA: 26 MMHG (ref 40–60)
PO2 BLDA: 26 MMHG (ref 40–60)
POC BE: -1 MMOL/L
POC BE: 2 MMOL/L
POC BE: 2 MMOL/L
POC METHB: 0.5 % (ref 0–3)
POC PERFORMED BY: NORMAL
POC SATURATED O2: 47 % (ref 95–100)
POC SATURATED O2: 48 % (ref 95–100)
POC SATURATED O2: 49 % (ref 95–100)
POC TCO2: 25 MMOL/L (ref 24–29)
POC TCO2: 27 MMOL/L (ref 24–29)
POC TCO2: 28 MMOL/L (ref 24–29)
POC TEMPERATURE: 37 C
POCT GLUCOSE: 190 MG/DL (ref 70–110)
POCT GLUCOSE: 199 MG/DL (ref 70–110)
POCT GLUCOSE: 227 MG/DL (ref 70–110)
POCT GLUCOSE: 282 MG/DL (ref 70–110)
POTASSIUM SERPL-SCNC: 4.1 MMOL/L (ref 3.5–5.1)
POTASSIUM SERPL-SCNC: 4.1 MMOL/L (ref 3.5–5.1)
PROT SERPL-MCNC: 7.3 G/DL (ref 6–8.4)
PROTHROMBIN TIME: 16.8 SEC (ref 12–15.5)
RBC # BLD AUTO: 2.42 M/UL (ref 4.6–6.2)
RBC # BLD AUTO: 3.68 M/UL (ref 4.6–6.2)
REPTILASE TIME: 14.7 SEC
SAMPLE: ABNORMAL
SCREEN APTT: 136 SEC (ref 32–48)
SCREEN DRVVT: 40 SEC (ref 33–44)
SITE: ABNORMAL
SODIUM SERPL-SCNC: 131 MMOL/L (ref 136–145)
SODIUM SERPL-SCNC: 134 MMOL/L (ref 136–145)
SPECIMEN SOURCE: NORMAL
SRA 0.1IU/ML UFH SER-ACNC: <5 %
SRA 100IU/ML UFH SER-ACNC: <5 %
SRA INTERP SER-IMP: NORMAL
SRA UFH SER-IMP: NEGATIVE
THROMBIN TIME: 76.7 SEC (ref 14.7–19.5)
WBC # BLD AUTO: 4.96 K/UL (ref 3.9–12.7)
WBC # BLD AUTO: 6 K/UL (ref 3.9–12.7)

## 2023-11-18 PROCEDURE — 25000003 PHARM REV CODE 250: Mod: NTX | Performed by: STUDENT IN AN ORGANIZED HEALTH CARE EDUCATION/TRAINING PROGRAM

## 2023-11-18 PROCEDURE — 85730 THROMBOPLASTIN TIME PARTIAL: CPT | Mod: NTX | Performed by: STUDENT IN AN ORGANIZED HEALTH CARE EDUCATION/TRAINING PROGRAM

## 2023-11-18 PROCEDURE — 25000003 PHARM REV CODE 250: Mod: NTX | Performed by: INTERNAL MEDICINE

## 2023-11-18 PROCEDURE — 94761 N-INVAS EAR/PLS OXIMETRY MLT: CPT | Mod: NTX,XB

## 2023-11-18 PROCEDURE — 63600175 PHARM REV CODE 636 W HCPCS: Mod: NTX | Performed by: INTERNAL MEDICINE

## 2023-11-18 PROCEDURE — 83880 ASSAY OF NATRIURETIC PEPTIDE: CPT | Mod: NTX | Performed by: STUDENT IN AN ORGANIZED HEALTH CARE EDUCATION/TRAINING PROGRAM

## 2023-11-18 PROCEDURE — 99291 CRITICAL CARE FIRST HOUR: CPT | Mod: NTX,,, | Performed by: INTERNAL MEDICINE

## 2023-11-18 PROCEDURE — 27000221 HC OXYGEN, UP TO 24 HOURS: Mod: NTX

## 2023-11-18 PROCEDURE — 80048 BASIC METABOLIC PNL TOTAL CA: CPT | Mod: NTX | Performed by: STUDENT IN AN ORGANIZED HEALTH CARE EDUCATION/TRAINING PROGRAM

## 2023-11-18 PROCEDURE — 63600175 PHARM REV CODE 636 W HCPCS: Mod: NTX | Performed by: STUDENT IN AN ORGANIZED HEALTH CARE EDUCATION/TRAINING PROGRAM

## 2023-11-18 PROCEDURE — 99900035 HC TECH TIME PER 15 MIN (STAT): Mod: NTX

## 2023-11-18 PROCEDURE — 20000000 HC ICU ROOM: Mod: NTX

## 2023-11-18 PROCEDURE — 85025 COMPLETE CBC W/AUTO DIFF WBC: CPT | Mod: NTX | Performed by: STUDENT IN AN ORGANIZED HEALTH CARE EDUCATION/TRAINING PROGRAM

## 2023-11-18 PROCEDURE — 25000003 PHARM REV CODE 250: Mod: NTX | Performed by: REGISTERED NURSE

## 2023-11-18 PROCEDURE — 83735 ASSAY OF MAGNESIUM: CPT | Mod: NTX | Performed by: STUDENT IN AN ORGANIZED HEALTH CARE EDUCATION/TRAINING PROGRAM

## 2023-11-18 PROCEDURE — 63600367 HC NITRIC OXIDE PER HOUR: Mod: NTX

## 2023-11-18 PROCEDURE — 84100 ASSAY OF PHOSPHORUS: CPT | Mod: NTX | Performed by: STUDENT IN AN ORGANIZED HEALTH CARE EDUCATION/TRAINING PROGRAM

## 2023-11-18 PROCEDURE — 85025 COMPLETE CBC W/AUTO DIFF WBC: CPT | Mod: 91,NTX | Performed by: STUDENT IN AN ORGANIZED HEALTH CARE EDUCATION/TRAINING PROGRAM

## 2023-11-18 PROCEDURE — 80053 COMPREHEN METABOLIC PANEL: CPT | Mod: NTX | Performed by: STUDENT IN AN ORGANIZED HEALTH CARE EDUCATION/TRAINING PROGRAM

## 2023-11-18 PROCEDURE — 82803 BLOOD GASES ANY COMBINATION: CPT | Mod: NTX

## 2023-11-18 PROCEDURE — 83050 HGB METHEMOGLOBIN QUAN: CPT | Mod: NTX

## 2023-11-18 RX ORDER — INSULIN ASPART 100 [IU]/ML
10 INJECTION, SOLUTION INTRAVENOUS; SUBCUTANEOUS
Status: DISCONTINUED | OUTPATIENT
Start: 2023-11-18 | End: 2023-11-19

## 2023-11-18 RX ADMIN — PANTOPRAZOLE SODIUM 40 MG: 40 TABLET, DELAYED RELEASE ORAL at 06:11

## 2023-11-18 RX ADMIN — INSULIN ASPART 1 UNITS: 100 INJECTION, SOLUTION INTRAVENOUS; SUBCUTANEOUS at 10:11

## 2023-11-18 RX ADMIN — HYDRALAZINE HYDROCHLORIDE 25 MG: 25 TABLET, FILM COATED ORAL at 01:11

## 2023-11-18 RX ADMIN — HYDRALAZINE HYDROCHLORIDE 25 MG: 25 TABLET, FILM COATED ORAL at 06:11

## 2023-11-18 RX ADMIN — INSULIN ASPART 10 UNITS: 100 INJECTION, SOLUTION INTRAVENOUS; SUBCUTANEOUS at 05:11

## 2023-11-18 RX ADMIN — HYDRALAZINE HYDROCHLORIDE 25 MG: 25 TABLET, FILM COATED ORAL at 09:11

## 2023-11-18 RX ADMIN — FUROSEMIDE 30 MG/HR: 10 INJECTION, SOLUTION INTRAMUSCULAR; INTRAVENOUS at 07:11

## 2023-11-18 RX ADMIN — DOBUTAMINE IN DEXTROSE 5 MCG/KG/MIN: 400 INJECTION, SOLUTION INTRAVENOUS at 03:11

## 2023-11-18 RX ADMIN — ACETAMINOPHEN 650 MG: 325 TABLET ORAL at 09:11

## 2023-11-18 RX ADMIN — HEPARIN SODIUM AND DEXTROSE 16 UNITS/KG/HR: 10000; 5 INJECTION INTRAVENOUS at 01:11

## 2023-11-18 RX ADMIN — ATORVASTATIN CALCIUM 40 MG: 40 TABLET, FILM COATED ORAL at 08:11

## 2023-11-18 RX ADMIN — INSULIN ASPART 3 UNITS: 100 INJECTION, SOLUTION INTRAVENOUS; SUBCUTANEOUS at 12:11

## 2023-11-18 RX ADMIN — AMIODARONE HYDROCHLORIDE 400 MG: 200 TABLET ORAL at 08:11

## 2023-11-18 RX ADMIN — CHLOROTHIAZIDE SODIUM 500 MG: 500 INJECTION, POWDER, LYOPHILIZED, FOR SOLUTION INTRAVENOUS at 03:11

## 2023-11-18 RX ADMIN — GABAPENTIN 300 MG: 300 CAPSULE ORAL at 09:11

## 2023-11-18 RX ADMIN — ASPIRIN 81 MG: 81 TABLET, COATED ORAL at 08:11

## 2023-11-18 RX ADMIN — THERA TABS 1 TABLET: TAB at 08:11

## 2023-11-18 RX ADMIN — FUROSEMIDE 30 MG/HR: 10 INJECTION, SOLUTION INTRAMUSCULAR; INTRAVENOUS at 06:11

## 2023-11-18 RX ADMIN — INSULIN ASPART 9 UNITS: 100 INJECTION, SOLUTION INTRAVENOUS; SUBCUTANEOUS at 08:11

## 2023-11-18 RX ADMIN — INSULIN ASPART 10 UNITS: 100 INJECTION, SOLUTION INTRAVENOUS; SUBCUTANEOUS at 12:11

## 2023-11-18 NOTE — PLAN OF CARE
HTS Care Update Note     CVP: 7  SVO2: 53  CI: 2.9  CO: 5.7  SVR: 827    UO: 25 cc/h, net +770    Infusions: 5, lasix gtt 15, hep gtt with Ketty 10    Mechanical support: n/a    Plan:  Lactic acid and BMP  IVP lasix 80 and up gtt to 30  Plan discussed with attending     Omid Manzanares MD  Cardiovascular Disease PGY-V  [facility]

## 2023-11-18 NOTE — PLAN OF CARE
Problem: Adult Inpatient Plan of Care  Goal: Plan of Care Review  Outcome: Ongoing, Progressing  Flowsheets (Taken 11/18/2023 1740)  Plan of Care Reviewed With:   patient   spouse  Goal: Patient-Specific Goal (Individualized)  Outcome: Ongoing, Progressing  Goal: Optimal Comfort and Wellbeing  Outcome: Ongoing, Progressing         Cardiac ICU Care Plan    POC reviewed with Radha Abbott and family. Questions and concerns addressed. See below and flowsheets for full assessment and VS info.     CVP 9 7 8   Svo2: 47 49    Neuro:  Roy Coma Scale  Best Eye Response: 4-->(E4) spontaneous  Best Motor Response: 6-->(M6) obeys commands  Best Verbal Response: 5-->(V5) oriented  Roy Coma Scale Score: 15  Assessment Qualifiers: patient not sedated/intubated  Pupil PERRLA: yes    24 hr Temp:  [97.6 °F (36.4 °C)-98.2 °F (36.8 °C)]      CV:  Rhythm: normal sinus rhythm   DVT prophylaxis: VTE Required Core Measure: Pharmacological prophylaxis initiated/maintained    CVP (mean): 9 mmHg (11/18/23 0701)       SVO2 (%): (S) 53 % (11/17/23 2002)               Pulses  Right Radial Pulse: 2+ (normal)  Left Radial Pulse: 2+ (normal)  Right Dorsalis Pedis Pulse: 2+ (normal)  Left Dorsalis Pedis Pulse: 2+ (normal)  Right Posterior Tibial Pulse: 1+ (weak)  Left Posterior Tibial Pulse: 1+ (weak)    Resp:  Flow (L/min): 4  Oxygen Concentration (%): 36    GI/:  GI prophylaxis: no  Diet/Nutrition Received: consistent carb/diabetic diet, restrict fluids  Last Bowel Movement: 11/18/23  Voiding Characteristics: urethral catheter (bladder)  [REMOVED]      Urethral Catheter 11/13/23 1800 Straight-tip 16 Fr.-Reason for Continuing Urinary Catheterization: Critically ill in ICU and requiring hourly monitoring of intake/output   Intake/Output Summary (Last 24 hours) at 11/18/2023 1741  Last data filed at 11/18/2023 1701  Gross per 24 hour   Intake 1522.7 ml   Output 2890 ml   Net -1367.3 ml        Nutritional Supplement Intake: Quantity 3, Type:  "Boost    Labs/Accuchecks:  Recent Labs   Lab 11/17/23  0405 11/18/23  0349 11/18/23  1236   WBC 6.30 6.00 4.96   RBC 2.57* 2.42* 3.68*   HGB 7.4* 7.1* 10.6*   HCT 22.7* 21.7* 32.7*    260 234      Recent Labs   Lab 11/15/23  1601 11/16/23  0436 11/17/23  0405 11/18/23  0349   INR 1.1  --   --   --    APTT  --  56.0* 58.1* 56.1*      Recent Labs     11/18/23  1236   *   K 4.1   CO2 25   CL 93*   BUN 77*   CREATININE 3.2*   ALKPHOS 85   ALT 16   AST 13   BILITOT 0.6     No results for input(s): "CPK", "CPKMB", "MB", "TROPONINI" in the last 168 hours.   Recent Labs     11/17/23  2000 11/18/23  0840 11/18/23  1512   PH 7.409 7.420 7.399   PCO2 36.7 40.6 39.1   PO2 28* 25* 26*   HCO3 23.2* 26.4 24.2   POCSATURATED 53 47 49   BE -1 2 -1       Electrolytes: N/A - electrolytes WDL  Accuchecks: ACHS    Gtts/LDAs:   sodium chloride 0.9%      DOBUTamine 5 mcg/kg/min (11/18/23 1701)    furosemide (LASIX) 500 mg in 50 mL infusion (conc: 10 mg/mL) 30 mg/hr (11/18/23 1701)    heparin (porcine) in D5W 16 Units/kg/hr (11/18/23 1701)    nitric oxide gas         Lines/Drains/Airways       Central Venous Catheter Line  Duration             Percutaneous Central Line Insertion/Assessment - Triple Lumen  11/14/23 1630 4 days              Peripheral Intravenous Line  Duration                  Midline Catheter Insertion/Assessment  - Single Lumen 11/14/23 1113 Left brachial vein 20g x 10cm 4 days                    Skin/Wounds  Bathing/Skin Care: (S) bath, complete;dressed/undressed;electrode patches/site rotation;linen changed (11/18/23 1501)  Wounds: No  Wound care consulted: No   "

## 2023-11-18 NOTE — NURSING
Nurses Note -- 4 Eyes      11/17/2023   7:32 PM      Skin assessed during: Daily Assessment      [x] No Altered Skin Integrity Present    []Prevention Measures Documented      [] Yes- Altered Skin Integrity Present or Discovered   [] LDA Added if Not in Epic (Describe Wound)   [] New Altered Skin Integrity was Present on Admit and Documented in LDA   [] Wound Image Taken    Wound Care Consulted? No    Attending Nurse:  KYAW Goncalves     Second RN/Staff Member: KYAW Pacheco

## 2023-11-18 NOTE — SUBJECTIVE & OBJECTIVE
Interval History: No acute events overnight.  Had rising CVP so lasix was bolused 80mg IV x 1 and increased from 15 to 30mh/hr with good response.  Made about 250-300cc/hr after change was made.  Had 2 BM yesterday, denies complaints.  Renal indices improved with diuresis.    Hemodynamics:  CVP 9  SVO2 47  CO 5.27  Body surface area is 1.97 meters squared.  CI 2.67  SVR 1,093    Continuous Infusions:   sodium chloride 0.9%      DOBUTamine 5 mcg/kg/min (11/18/23 0801)    furosemide (LASIX) 500 mg in 50 mL infusion (conc: 10 mg/mL) 30 mg/hr (11/18/23 0801)    heparin (porcine) in D5W 16 Units/kg/hr (11/18/23 0801)    nitric oxide gas       Scheduled Meds:   amiodarone  400 mg Oral Daily    aspirin  81 mg Oral Daily    atorvastatin  40 mg Oral Daily    hydrALAZINE  25 mg Oral Q8H    insulin aspart U-100  9 Units Subcutaneous TIDWM    magnesium oxide  400 mg Oral Daily    multivitamin  1 tablet Oral Daily    pantoprazole  40 mg Oral Before breakfast    senna-docusate 8.6-50 mg  2 tablet Oral BID     PRN Meds:sodium chloride 0.9%, acetaminophen, dextrose 10%, dextrose 10%, gabapentin, glucagon (human recombinant), glucose, glucose, heparin (PORCINE), heparin (PORCINE), insulin aspart U-100    Review of patient's allergies indicates:  No Known Allergies  Objective:     Vital Signs (Most Recent):  Temp: 98.1 °F (36.7 °C) (11/18/23 0701)  Pulse: 78 (11/18/23 0839)  Resp: 18 (11/18/23 0839)  BP: 133/78 (11/18/23 0834)  SpO2: 100 % (11/18/23 0839) Vital Signs (24h Range):  Temp:  [98.1 °F (36.7 °C)-98.3 °F (36.8 °C)] 98.1 °F (36.7 °C)  Pulse:  [78-94] 78  Resp:  [11-27] 18  SpO2:  [100 %] 100 %  BP: ()/(52-78) 133/78     Patient Vitals for the past 72 hrs (Last 3 readings):   Weight   11/18/23 0400 76.5 kg (168 lb 10.4 oz)   11/17/23 0400 76 kg (167 lb 8.8 oz)     Body mass index is 22.87 kg/m².      Intake/Output Summary (Last 24 hours) at 11/18/2023 0857  Last data filed at 11/18/2023 0801  Gross per 24 hour   Intake  "1441.04 ml   Output 1915 ml   Net -473.96 ml          Physical Exam  Constitutional:       Comments: Alert, Oriented, No acute distress   HENT:      Head: Normocephalic and atraumatic.   Eyes:      Conjunctiva/sclera: Conjunctivae normal.   Neck:      Vascular: No hepatojugular reflux or JVD.      Comments: JVP does not appear elevated.   Cardiovascular:      Rate and Rhythm: Normal rate and regular rhythm.   Pulmonary:      Comments: Normal effort, Clear to auscultation   Abdominal:      Comments: Soft, Non-tender, Non-distended   Musculoskeletal:      Cervical back: Normal range of motion.      Right lower leg: No edema.      Left lower leg: No edema.      Comments: No peripheral edema   Skin:     Comments: Dry, Intact, Warm, Capillary refill <2 seconds.    Neurological:      Mental Status: He is alert and oriented to person, place, and time.      Comments: Normal speech   Psychiatric:         Mood and Affect: Mood and affect normal.            Significant Labs:  CBC:  Recent Labs   Lab 11/16/23  0436 11/17/23 0405 11/18/23  0349   WBC 7.21 6.30 6.00   RBC 2.70* 2.57* 2.42*   HGB 7.8* 7.4* 7.1*   HCT 23.9* 22.7* 21.7*    263 260   MCV 89 88 90   MCH 28.9 28.8 29.3   MCHC 32.6 32.6 32.7     BNP:  No results for input(s): "BNP" in the last 168 hours.    Invalid input(s): "BNPTRIAGELBLO"  CMP:  Recent Labs   Lab 11/17/23  1402 11/17/23  2231 11/18/23  0349   * 216* 152*   CALCIUM 8.9 8.7 8.8   * 130* 134*   K 4.4 4.3 4.1   CO2 22* 23 24   CL 98 96 97   BUN 67* 77* 74*   CREATININE 3.2* 3.2* 3.0*      Coagulation:   Recent Labs   Lab 11/11/23  0958 11/11/23  1600 11/15/23  1601 11/16/23  0436 11/17/23  0405 11/18/23  0349   INR 1.2  --  1.1  --   --   --    APTT 31.3   < >  --  56.0* 58.1* 56.1*    < > = values in this interval not displayed.     LDH:  Recent Labs   Lab 11/15/23  1601        Microbiology:  Microbiology Results (last 7 days)       ** No results found for the last 168 hours. " **            I have reviewed all pertinent labs within the past 24 hours.    Estimated Creatinine Clearance: 28 mL/min (A) (based on SCr of 3 mg/dL (H)).    Diagnostic Results:  I have reviewed all pertinent imaging results/findings within the past 24 hours.

## 2023-11-18 NOTE — PLAN OF CARE
Cardiac ICU Care Plan    POC reviewed with Radha Abbott and family. Questions and concerns addressed.  See below and flowsheets for full assessment and VS info.    CVP 6 9  Svo2 55    Hampton removed today. Condom cath placed. 60 mg IVP lasix given. Lasix gtt now at 15 mg. Nitric at 10 on 4L      Neuro:  [place] Coma Scale  Best Eye Response: 4-->(E4) spontaneous  Best Motor Response: 6-->(M6) obeys commands  Best Verbal Response: 5-->(V5) oriented  [place] Coma Scale Score: 15  Assessment Qualifiers: patient not sedated/intubated  Pupil PERRLA: yes    24 hr Temp:  [97.8 °F (36.6 °C)-98.5 °F (36.9 °C)]      CV:  Rhythm: normal sinus rhythm   DVT prophylaxis: VTE Required Core Measure: Pharmacological prophylaxis initiated/maintained    CVP (mean): 9 mmHg (11/17/23 1501)       SVO2 (%): (S) 47 % (11/16/23 2002)               Pulses  Right Radial Pulse: 2+ (normal)  Left Radial Pulse: 2+ (normal)  Right Dorsalis Pedis Pulse: 2+ (normal)  Left Dorsalis Pedis Pulse: 2+ (normal)  Right Posterior Tibial Pulse: 1+ (weak)  Left Posterior Tibial Pulse: 1+ (weak)    Resp:  Flow (L/min): 4  Oxygen Concentration (%): 36    GI/:  GI prophylaxis: no  Diet/Nutrition Received: consistent carb/diabetic diet, restrict fluids  Last Bowel Movement: 11/17/23  Voiding Characteristics: urethral catheter (bladder)  [REMOVED]      Urethral Catheter 11/13/23 1800 Straight-tip 16 Fr.-Reason for Continuing Urinary Catheterization: Critically ill in ICU and requiring hourly monitoring of intake/output   Intake/Output Summary (Last 24 hours) at 11/17/2023 1856  Last data filed at 11/17/2023 1801  Gross per 24 hour   Intake 1173.03 ml   Output 1200 ml   Net -26.97 ml        Nutritional Supplement Intake: Quantity 3, Type: Boost    Labs/Accuchecks:  Recent Labs   Lab 11/15/23  0401 11/16/23  0436 11/17/23  0405   WBC 7.02 7.21 6.30   RBC 2.77* 2.70* 2.57*   HGB 8.0* 7.8* 7.4*   HCT 24.9* 23.9* 22.7*    257 263      Recent Labs   Lab  "11/11/23  0958 11/11/23  1600 11/15/23  0401 11/15/23  1601 11/16/23  0436 11/17/23  0405   INR 1.2  --   --  1.1  --   --    APTT 31.3   < > 49.4*  --  56.0* 58.1*    < > = values in this interval not displayed.      Recent Labs     11/17/23  1402   *   K 4.4   CO2 22*   CL 98   BUN 67*   CREATININE 3.2*     No results for input(s): "CPK", "CPKMB", "MB", "TROPONINI" in the last 168 hours.   Recent Labs     11/16/23 2007 11/17/23  0431 11/17/23  0806   PH 7.438 7.425 7.422   PCO2 34.5* 36.1 36.8   PO2 24* 32* 28*   HCO3 23.3* 23.7* 24.0   POCSATURATED 47 64 55   BE -1 -1 0       Electrolytes: N/A - electrolytes WDL  Accuchecks: ACHS    Gtts/LDAs:   sodium chloride 0.9%      DOBUTamine 5 mcg/kg/min (11/17/23 1801)    furosemide (LASIX) 500 mg in 50 mL infusion (conc: 10 mg/mL) 15 mg/hr (11/17/23 1801)    heparin (porcine) in D5W 16 Units/kg/hr (11/17/23 1801)    nitric oxide gas         Lines/Drains/Airways       Central Venous Catheter Line  Duration             Percutaneous Central Line Insertion/Assessment - Triple Lumen  11/14/23 1630 3 days              Peripheral Intravenous Line  Duration                  Midline Catheter Insertion/Assessment  - Single Lumen 11/14/23 1113 Left brachial vein 20g x 10cm 3 days                    Skin/Wounds  Bathing/Skin Care: (S) bath, complete;dressed/undressed;linen changed (11/17/23 1601)  Wounds: No  Wound care consulted: No   "

## 2023-11-18 NOTE — ASSESSMENT & PLAN NOTE
Pt with known ICM with an EF of 25% on home  presenting with decompensated HF.  Not in cardiogenic shock    [facility] 11/14: RA 14, PA 70/35, PCWP 32, CO 4.1, CI 2.1.     - Home  gtt at 5  - home GDMT: entresto 24/26mg BID (on hold 2/2 IVÁN), Hydralazine 25 q8h being continued  - home diuretics: Was taking lasix 40 bid  - CTS to follow regarding potential need for upgraded support to temporary MCS as bridge to advanced therapies.l   - Continue VAD/OHTx workup. On step 4  -Blood type A+  -PT/OT and nutrition   -Continue current support with  5, Ketty, and Lasix gtt @10/mg/hr.

## 2023-11-18 NOTE — CARE UPDATE
Care Update:     No acute events overnight. Patient on the CICU in room [id number] A. Blood glucose stable. BG above goal on current insulin regimen (SSI and prandial insulin). Steroid use- None. 5 Days Post-Op  Renal function- Abnormal - Creatinine 3.0   Vasopressors-  None  (multiple drips mixed in dextrose)      Diet diabetic 2800 Calorie; Fluid - 2000mL     POCT Glucose   Date Value Ref Range Status   11/18/2023 199 (H) 70 - 110 mg/dL Final   11/17/2023 237 (H) 70 - 110 mg/dL Final   11/17/2023 271 (H) 70 - 110 mg/dL Final   11/17/2023 197 (H) 70 - 110 mg/dL Final   11/17/2023 169 (H) 70 - 110 mg/dL Final   11/16/2023 156 (H) 70 - 110 mg/dL Final   11/16/2023 213 (H) 70 - 110 mg/dL Final   11/16/2023 314 (H) 70 - 110 mg/dL Final   11/16/2023 179 (H) 70 - 110 mg/dL Final   11/16/2023 173 (H) 70 - 110 mg/dL Final   11/15/2023 216 (H) 70 - 110 mg/dL Final   11/15/2023 214 (H) 70 - 110 mg/dL Final     Lab Results   Component Value Date    HGBA1C 7.6 (H) 10/12/2023       Endocrinology consulted for BG management.   BG goal 140-180    Endocrine  Type 2 diabetes mellitus without complication, without long-term current use of insulin  BG goal 140-180     - Holding off basal insulin since FBG WNL for hospital goals.   - Novolog 10 units TID with meals (20% increase due to prandial blood glucose  above goal)   - Low Dose Correction Scale (given kidney function)  - BG monitoring ac/hs      ** Please notify Endocrine for any change and/or advance in diet**  ** Please call Endocrine for any BG related issues **    Discharge Planning:   TBD. Please notify endocrinology prior to discharge.      Erasmo Parrish DNP, FNP-C  Department of Endocrinology  Inpatient Glycemic Management

## 2023-11-18 NOTE — PROGRESS NOTES
[facility] - Cardiac Intensive Care  Heart Transplant  Progress Note    Patient Name: Radha Abbott  MRN: [id number]  Admission Date: 11/9/2023  Hospital Length of Stay: 9 days  Attending Physician: Sandhya Price MD  Primary Care Provider: Vasu Kong MD  Principal Problem:Acute on chronic combined systolic and diastolic CHF, NYHA class 4    Subjective:   Interval History: No acute events overnight.  Had rising CVP so lasix was bolused 80mg IV x 1 and increased from 15 to 30mh/hr with good response.  Made about 250-300cc/hr after change was made.  Had 2 BM yesterday, denies complaints.  Renal indices improved with diuresis.    Hemodynamics:  CVP 9  SVO2 47  CO 5.27  Body surface area is 1.97 meters squared.  CI 2.67  SVR 1,093    Continuous Infusions:   sodium chloride 0.9%      DOBUTamine 5 mcg/kg/min (11/18/23 0801)    furosemide (LASIX) 500 mg in 50 mL infusion (conc: 10 mg/mL) 30 mg/hr (11/18/23 0801)    heparin (porcine) in D5W 16 Units/kg/hr (11/18/23 0801)    nitric oxide gas       Scheduled Meds:   amiodarone  400 mg Oral Daily    aspirin  81 mg Oral Daily    atorvastatin  40 mg Oral Daily    hydrALAZINE  25 mg Oral Q8H    insulin aspart U-100  9 Units Subcutaneous TIDWM    magnesium oxide  400 mg Oral Daily    multivitamin  1 tablet Oral Daily    pantoprazole  40 mg Oral Before breakfast    senna-docusate 8.6-50 mg  2 tablet Oral BID     PRN Meds:sodium chloride 0.9%, acetaminophen, dextrose 10%, dextrose 10%, gabapentin, glucagon (human recombinant), glucose, glucose, heparin (PORCINE), heparin (PORCINE), insulin aspart U-100    Review of patient's allergies indicates:  No Known Allergies  Objective:     Vital Signs (Most Recent):  Temp: 98.1 °F (36.7 °C) (11/18/23 0701)  Pulse: 78 (11/18/23 0839)  Resp: 18 (11/18/23 0839)  BP: 133/78 (11/18/23 0834)  SpO2: 100 % (11/18/23 0839) Vital Signs (24h Range):  Temp:  [98.1 °F (36.7 °C)-98.3 °F (36.8 °C)] 98.1 °F (36.7 °C)  Pulse:  [78-94] 78  Resp:   "[11-27] 18  SpO2:  [100 %] 100 %  BP: ()/(52-78) 133/78     Patient Vitals for the past 72 hrs (Last 3 readings):   Weight   11/18/23 0400 76.5 kg (168 lb 10.4 oz)   11/17/23 0400 76 kg (167 lb 8.8 oz)     Body mass index is 22.87 kg/m².      Intake/Output Summary (Last 24 hours) at 11/18/2023 0857  Last data filed at 11/18/2023 0801  Gross per 24 hour   Intake 1441.04 ml   Output 1915 ml   Net -473.96 ml          Physical Exam  Constitutional:       Comments: Alert, Oriented, No acute distress   HENT:      Head: Normocephalic and atraumatic.   Eyes:      Conjunctiva/sclera: Conjunctivae normal.   Neck:      Vascular: No hepatojugular reflux or JVD.      Comments: JVP does not appear elevated.   Cardiovascular:      Rate and Rhythm: Normal rate and regular rhythm.   Pulmonary:      Comments: Normal effort, Clear to auscultation   Abdominal:      Comments: Soft, Non-tender, Non-distended   Musculoskeletal:      Cervical back: Normal range of motion.      Right lower leg: No edema.      Left lower leg: No edema.      Comments: No peripheral edema   Skin:     Comments: Dry, Intact, Warm, Capillary refill <2 seconds.    Neurological:      Mental Status: He is alert and oriented to person, place, and time.      Comments: Normal speech   Psychiatric:         Mood and Affect: Mood and affect normal.            Significant Labs:  CBC:  Recent Labs   Lab 11/16/23  0436 11/17/23  0405 11/18/23  0349   WBC 7.21 6.30 6.00   RBC 2.70* 2.57* 2.42*   HGB 7.8* 7.4* 7.1*   HCT 23.9* 22.7* 21.7*    263 260   MCV 89 88 90   MCH 28.9 28.8 29.3   MCHC 32.6 32.6 32.7     BNP:  No results for input(s): "BNP" in the last 168 hours.    Invalid input(s): "BNPTRIAGELBLO"  CMP:  Recent Labs   Lab 11/17/23  1402 11/17/23  2231 11/18/23  0349   * 216* 152*   CALCIUM 8.9 8.7 8.8   * 130* 134*   K 4.4 4.3 4.1   CO2 22* 23 24   CL 98 96 97   BUN 67* 77* 74*   CREATININE 3.2* 3.2* 3.0*      Coagulation:   Recent Labs   Lab " 11/11/23  0958 11/11/23  1600 11/15/23  1601 11/16/23  0436 11/17/23  0405 11/18/23  0349   INR 1.2  --  1.1  --   --   --    APTT 31.3   < >  --  56.0* 58.1* 56.1*    < > = values in this interval not displayed.     LDH:  Recent Labs   Lab 11/15/23  1601        Microbiology:  Microbiology Results (last 7 days)       ** No results found for the last 168 hours. **            I have reviewed all pertinent labs within the past 24 hours.    Estimated Creatinine Clearance: 28 mL/min (A) (based on SCr of 3 mg/dL (H)).    Diagnostic Results:  I have reviewed all pertinent imaging results/findings within the past 24 hours.  Assessment and Plan:     63 year old male with hx of CAD s/p 3v CABG (unclear anatomy, 2009), ICM with a recent EF of 15-20% s/p ICD (medtronik 2009), DM2 (a1c 7.7), HTN, HLD, Vfib on amio who presents to the ED with CC of SOB     Pt was recently admitted to [facility] as a transfer.  He was started on a Lasix gtt and did well.  Started on gDMT and discharged home on  5 with plans to follow up in HTS clinic for ongoing transplant evaluation at another facility.  He says that about a few days ago he started to notice LE swelling.  This turned into Nelson and orthopnea.  He says he can't walk to the bathroom without getting SOB.  He also complains of weight gain.  He takes torsemide 20mg BID at home and was told to trial additional Lasix which he did without any improvement.  He was rx metolazone but has not been filled.  He came to the ED     In the ED he was AF with stable VS on RA.  CBC showing chronic anemia.  CMP notable for acute on chronic CKD with baseline around 2.1 and Cr now 2.6.  BNP elevated.  Lactate neg.  CXR showing pulmonary edema.  I evaluated the pt at the bedside.  Bedside TTE showing CVP >15.  He was subsequently admitted to the CCU for diuresis.     He was continued on his home  and was started on a lasix gtt, which he responded well to overnight (net -1700cc. He feels much better  this morning as well. Our transplant coordinators have been working on insurance approval and he is now being transferred to [facility] service for transplant evaluation.     * Acute on chronic combined systolic and diastolic CHF, NYHA class 4  Pt with known ICM with an EF of 25% on home  presenting with decompensated HF.  Not in cardiogenic shock    [facility] 11/14: RA 14, PA 70/35, PCWP 32, CO 4.1, CI 2.1.     - Home  gtt at 5  - home GDMT: entresto 24/26mg BID (on hold 2/2 IVÁN), Hydralazine 25 q8h being continued  - home diuretics: Was taking lasix 40 bid  - CTS to follow regarding potential need for upgraded support to temporary MCS as bridge to advanced therapies.l   - Continue VAD/OHTx workup. On step 4  -Blood type A+  -PT/OT and nutrition   -Continue current support with  5, Ketty, and Lasix gtt @10/mg/hr.     PAF (paroxysmal atrial fibrillation)  Known hx of pAF. In sinus rhythm on admission, but 1 run of AF RVR overnight. He spontaneously converted.  - Continue home amiodarone 400mg qd  - Stop home Eliquis and continue heparin gtt while in the hospital.       IVÁN (acute kidney injury)  IVÁN on CKD2. Baseline Cr of 1.7-2.0.   Currently 3.0, trending down  Believed to be due to cardiorenal  - Trend along with CVP, improving with diuresis  - Hold ARNi, entresto    Plan:  Repeat hemodynamics and chemistry this afternoon for need for additional dose of diuril    Type 2 diabetes mellitus without complication, without long-term current use of insulin  -A1c 7.6, not insulin dependent  - MDSSI  -diabetic diet ordered/Boost glucose control   - Endocrine following, appreciate assistance with blood glucose management    CAD (coronary artery disease)  -S/p 3vCABG in 2009  - continue home ASA, HI statin    Ventricular tachycardia  Hx VT s/p ICD placement (medtronic 2009)  - Continue home amio 400mg qd  -monitor telemetry, no events on review of telemetry overnight        Ghanshyam Gupta MD  Heart Transplant  [facility] - Cardiac  Intensive Care

## 2023-11-18 NOTE — ASSESSMENT & PLAN NOTE
IVÁN on CKD2. Baseline Cr of 1.7-2.0.   Currently 3.0, trending down  Believed to be due to cardiorenal  - Trend along with CVP, improving with diuresis  - Hold ARNi, entresto    Plan:  Repeat hemodynamics and chemistry this afternoon for need for additional dose of diuril

## 2023-11-18 NOTE — PLAN OF CARE
CICU Care Plan    POC reviewed with Radha Abbott and family at 1902. Pt verbalized understanding. Questions and concerns addressed. No acute events noted tonight. Patient with decreased UOP and increased CVP at 2302, see flowsheet for further details. HTS Fellow notified and orders placed for 80mg IVP Lasix, Increase lasix gtt to 30mg/hr and labs collected. Patient continued with minimal UOP, 500 mg Diuril ordered by HTS Fellow. Pt progressing toward goals. Security measures in place, plan of care to continue. See below and flowsheets for full assessment and VS info.     CVP: 7 , 10 , 7  SvO2: 53    GTTs:  Heparin @ 16 units/kg/hr  Dobutamine @ 5mcg/kg/min  Lasix @ 30 mg/hr    Neuro:  Jackson Coma Scale  Best Eye Response: 4-->(E4) spontaneous  Best Motor Response: 6-->(M6) obeys commands  Best Verbal Response: 5-->(V5) oriented  Michelle Coma Scale Score: 15  Assessment Qualifiers: patient not sedated/intubated, no eye obstruction present  Pupil PERRLA: yes     24 hr Temp:  [98.2 °F (36.8 °C)-98.3 °F (36.8 °C)]     CV:   Rhythm: normal sinus rhythm  BP goals:   MAP > 65    Resp:      Oxygen Concentration (%): 36    Plan: 4L NC on 10 PPM of Nitric Oxide    GI/:     Diet/Nutrition Received: consistent carb/diabetic diet  Last Bowel Movement: 11/17/23  Voiding Characteristics: voids spontaneously without difficulty    Intake/Output Summary (Last 24 hours) at 11/18/2023 0532  Last data filed at 11/18/2023 0502  Gross per 24 hour   Intake 1433.65 ml   Output 1650 ml   Net -216.35 ml     Unmeasured Output  Urine Occurrence: 2  Stool Occurrence: 1      Labs/Accuchecks:  Recent Labs   Lab 11/18/23  0349   WBC 6.00   RBC 2.42*   HGB 7.1*   HCT 21.7*         Recent Labs   Lab 11/18/23  0349   *   K 4.1   CO2 24   CL 97   BUN 74*   CREATININE 3.0*      Recent Labs   Lab 11/15/23  1601 11/16/23  0436 11/18/23  0349   INR 1.1  --   --    APTT  --    < > 56.1*    < > = values in this interval not displayed.    No  "results for input(s): "CPK", "CPKMB", "TROPONINI", "MB" in the last 168 hours.    Electrolytes: N/A - electrolytes WDL  Accuchecks: ACHS    Gtts:   sodium chloride 0.9%      DOBUTamine 5 mcg/kg/min (11/18/23 0502)    furosemide (LASIX) 500 mg in 50 mL infusion (conc: 10 mg/mL) 30 mg/hr (11/18/23 0502)    heparin (porcine) in D5W 16 Units/kg/hr (11/18/23 0502)    nitric oxide gas         LDA/Wounds:  Lines/Drains/Airways       Central Venous Catheter Line  Duration             Percutaneous Central Line Insertion/Assessment - Triple Lumen  11/14/23 1630 3 days              Peripheral Intravenous Line  Duration                  Midline Catheter Insertion/Assessment  - Single Lumen 11/14/23 1113 Left brachial vein 20g x 10cm 3 days                  Wounds: No  Wound care consulted: No       Problem: Violence Risk or Actual  Goal: Anger and Impulse Control  Outcome: Ongoing, Progressing     Problem: Adult Inpatient Plan of Care  Goal: Plan of Care Review  Outcome: Ongoing, Progressing  Goal: Patient-Specific Goal (Individualized)  Outcome: Ongoing, Progressing  Goal: Absence of Hospital-Acquired Illness or Injury  Outcome: Ongoing, Progressing  Goal: Optimal Comfort and Wellbeing  Outcome: Ongoing, Progressing  Goal: Readiness for Transition of Care  Outcome: Ongoing, Progressing     Problem: Diabetes Comorbidity  Goal: Blood Glucose Level Within Targeted Range  Outcome: Ongoing, Progressing     Problem: Fluid and Electrolyte Imbalance (Acute Kidney Injury/Impairment)  Goal: Fluid and Electrolyte Balance  Outcome: Ongoing, Progressing     Problem: Oral Intake Inadequate (Acute Kidney Injury/Impairment)  Goal: Optimal Nutrition Intake  Outcome: Ongoing, Progressing     Problem: Renal Function Impairment (Acute Kidney Injury/Impairment)  Goal: Effective Renal Function  Outcome: Ongoing, Progressing     Problem: Infection  Goal: Absence of Infection Signs and Symptoms  Outcome: Ongoing, Progressing     Problem: Adjustment " to Illness (Heart Failure)  Goal: Optimal Coping  Outcome: Ongoing, Progressing     Problem: Cardiac Output Decreased (Heart Failure)  Goal: Optimal Cardiac Output  Outcome: Ongoing, Progressing     Problem: Dysrhythmia (Heart Failure)  Goal: Stable Heart Rate and Rhythm  Outcome: Ongoing, Progressing     Problem: Fluid Imbalance (Heart Failure)  Goal: Fluid Balance  Outcome: Ongoing, Progressing     Problem: Functional Ability Impaired (Heart Failure)  Goal: Optimal Functional Ability  Outcome: Ongoing, Progressing     Problem: Oral Intake Inadequate (Heart Failure)  Goal: Optimal Nutrition Intake  Outcome: Ongoing, Progressing     Problem: Respiratory Compromise (Heart Failure)  Goal: Effective Oxygenation and Ventilation  Outcome: Ongoing, Progressing     Problem: Sleep Disordered Breathing (Heart Failure)  Goal: Effective Breathing Pattern During Sleep  Outcome: Ongoing, Progressing     Problem: Cardiac Output Decreased  Goal: Effective Cardiac Output  Outcome: Ongoing, Progressing     Problem: Fall Injury Risk  Goal: Absence of Fall and Fall-Related Injury  Outcome: Ongoing, Progressing     Problem: Coping Ineffective  Goal: Effective Coping  Outcome: Ongoing, Progressing

## 2023-11-18 NOTE — ASSESSMENT & PLAN NOTE
Hx VT s/p ICD placement (medtronic 2009)  - Continue home amio 400mg qd  -monitor telemetry, no events on review of telemetry overnight

## 2023-11-18 NOTE — PROGRESS NOTES
11/16/2023     Left Ventricular Assist Device (LVAD) and Transplant Recipient Adult Psychosocial Assessment     Met with pt and his wife in pt's hospital room.  Pt's sister was on pt's phone via face time for visit.     Radha Abbott  [address]  [place] [address]      Telephone Information:   Mobile [phone]   Home               [phone] (home)  Work                There is no work phone number on file.  E-mail              [email or web address]     Sex: male  YOB: 1960  Age: 63 y.o.     Encounter Date: 11/16/2023  [place] Citizen: yes  Primary Language: English   Needed: no     Emergency Contact:  Name: Arlyn Abbott  Relationship: spouse  Address: same as patient  Phone Numbers:  [phone] (mobile)     Family/Social Support:   Number of dependents/: none  Marital history: Pt  3 times.  First 2 ending in divorce.  Pt has been in current marriage since 2020.   Other family dynamics: Pt has 3 daughters and 1 son (Ashlyn, Sulema, April, and Norm). Pt's children are in their   30's and 40's (did not know exact ages). Pt's wife has a son that lives in [place] (Aman, 34y).  Pt's children live in [place] near pt and are supportive per pt. Pt has 3 sisters and 2 brothers that live near him, and he describes them as supportive.     Household Composition:  Pt and his wife reside together.  No one else resides in the home.  Both pt and his wife drive.  Pt does limited driving however.     Do you and your caregivers have access to reliable transportation? yes     PRIMARY CAREGIVER: Arlyn Abbott (spouse) will be primary caregiver, phone number [phone], drives, works full time.     SECONDARY CAREGIVER: Teetee Zhou (sister) [phone], [phone], drives, unemployed.       provided in-depth information to Patient and Caregiver regarding  regarding pre- and post-LVAD and pre- and post-transplant caregiver role.   strongly  encourages Patient and Caregiver to have concrete plan regarding post-transplant care giving, including back-up caregiver(s) to ensure care giving needs are met as needed.     Patient and Caregiver states understanding all aspects of caregiver role/commitment and is able/willing/committed to being caregiver to the fullest extent necessary. .       Patient and Caregiver verbalizes understanding of the education provided today and caregiver responsibilities.        remains available. Patient and Caregiver agree to contact  in a timely manner if concerns arise.       Able to take time off work without financial concerns: yes.      Living Will: no  Healthcare Power of : no  Advance Directives on file: <<no information> per medical record.  Verbally reviewed LW/HCPA information.   provided patient with copy of LW/HCPA documents and provided education on completion of forms     Highest Education Level: High School (9-12) or GED  Reading Ability: 12th grade  Reports difficulty with: seeing at times due to diabetes.  Learns Best By:  one on one support      Status: no  VA Benefits: no      Working for Income: No  If no, reason not working: Disability  Spouse/Significant Other Employment: Spouse works full time at [employer] as a patient access rep.     Disabled: yes: date disability began: 2009, due to: heart issues.     Monthly Income:  Salary/Wages: $1500 (spouse's work)  SS Disability: $500  Able to afford all costs now and if transplanted or receives LVAD, including medications: yes  Pt reports secure power source? yes  Pt reports ability to afford monthly electric bill? yes  Pt reports ability to afford LVAD dressing supplies? yes  Patient and Caregiver verbalizes understanding of personal responsibilities related to LVAD and transplant costs and the importance of having a financial plan to ensure that patients LVAD and transplant costs are fully covered.         provided fundraising information/education.  Patient and Caregiver verbalizes understanding.   remains available.     Insurance:   Payer/Plan Subscr  Sex Relation Sub. Ins. ID Effective Group Num   1. OPTUM MANAGEDALISON BUCK 1960 Male Self [id number] 10/31/23                                     [address] [address]      2. [employer]* ALISON ZENDEJAS 1960 Male Self [id number] 10/9/23                                    PO          Primary Insurance (for UNOS reporting): Public Insurance - Medicaid  Secondary Insurance (for UNOS reporting): None  Patient and Caregiver verbalizes clear understanding that patient may experience difficulty obtaining and/or be denied insurance coverage post-surgery. This includes and is not limited to disability insurance, life insurance, health insurance, burial insurance, long term care insurance, and other insurances.       Patient and Caregiver also reports understanding that future health concerns   related to or unrelated to LVAD or transplantation may not be covered by patient's insurance.  Resources and information provided and reviewed.       Patient and Caregiver provides verbal permission to release any necessary information to outside resources for patient care and discharge planning.  Resources and information provided are reviewed.       Infusion Service: patient utilizing? yes ([facility])  Home Health: patient utilizing? yes ([facility])  DME: no  Pulmonary/Cardiac Rehab: denies   ADLS:  Indep with ADLs.     Adherence:   Pt reported high adherence to healthcare regimen.  Adherence education and counseling provided.      Per History Section:       Past Medical History:   Diagnosis Date    CAD (coronary artery disease)      Diabetes mellitus      HFrEF (heart failure with reduced ejection fraction)      ICD (implantable cardioverter-defibrillator) in place      MI, old        Social History            Tobacco  Use    Smoking status: Every Day       Current packs/day: 0.50       Types: Cigarettes    Smokeless tobacco: Not on file   Substance Use Topics    Alcohol use: Yes       Comment: rarely      Social History          Substance and Sexual Activity   Drug Use No      Social History          Substance and Sexual Activity   Sexual Activity Not on file         Per Today's Psychosocial:  Tobacco:  Pt reported no tobacco use since 10/7/2023.  Pt had been smoking .5 ppd .  Alcohol: none, patient denies any use.  Illicit Drugs/Non-prescribed Medications: none, patient denies any use.     Patient and Caregiver states clear understanding of the potential impact of substance use as it relates to LVAD and transplant candidacy and is aware of possible random substance screening.  Substance abstinence/cessation counseling, education and resources provided and reviewed.      Arrests/DWI/Treatment/Rehab: patient denies     Psychiatric History:    Mental Health:  Pt denies any current or hx of mental health issues.  Psychiatrist/Counselor: Pt denies  Medications:  Pt denies  Suicide/Homicide Issues: Pt denies HI/SI in past or current.   Safety at home: Pt reported feeling safe at home and free from abuse/neglect.     Knowledge: Patient and Caregiver states having clear understanding and realistic expectations regarding the potential risks and potential benefits LVAD implantation and organ transplantation and organ donation and agrees to discuss with health care team members and support system members, as well as to utilize available resources and express questions and/or concerns in order to further facilitate the pt informed decision-making.  Resources and information provided and reviewed.      Patient and Caregiver is aware of [facility]'s affiliation and/or partnership with agencies in home health care, LTAC, SNF, [facility], and other hospitals and clinics.     Understanding: Patient and Caregiver reports having a clear understanding of the  many lifetime commitments involved with being an LVAD and transplant recipient, including costs, compliance, medications, lab work, procedures, appointments, concrete and financial planning, preparedness, timely and appropriate communication of concerns, abstinence (ETOH, tobacco, illicit non-prescribed drugs), adherence to all health care team recommendations, support system and caregiver involvement, appropriate and timely resource utilization and follow-through, mental health counseling as needed/recommended, and patient and caregiver responsibilities.  Social Service Handbook, resources and detailed educational information provided and reviewed.  Educational information provided.     Patient and Caregiver also reports current and expected compliance with health care regime and states having a clear understanding of the importance of compliance.        Patient and Caregiver reports a clear understanding that risks and benefits may be involved with LVAD heart failure treatment and organ transplantation and with organ donation.      Patient and Caregiver also reports clear understanding that psychosocial risk factors may affect patient, and include but are not limited to feelings of depression, generalized anxiety, anxiety regarding dependence on others, post traumatic stress disorder, feelings of guilt and other emotional and/or mental concerns, and/or exacerbation of existing mental health concerns.  Detailed resources provided and discussed.       Patient and Caregiver agrees to access appropriate resources in a timely manner as needed and/or as recommended, and to communicate concerns appropriately.      Patient and Caregiver also reports a clear understanding of treatment options available.       Feelings or Concerns: Pt reported that he is anxious to have surgery and feel better.     Coping: Identify Patient & Caregiver Strategies to [place]:  1. In the past, coping with major surgery and/or related stress -  Family support, friends, rides motorcycle, fishing, games on phone, watch TV, prayer.              2. Currently & Pre-transplant - family support, TV, prayer              3. At the time of surgery - family support, TV, prayer              4. During post-Transplant & Recovery Period - family support, TV, prayer, and resume normal activties          As able.     Pt attends [employer] ([employer]).     Goals: Pt reported wanting to live longer and continue to enjoy riding his motorcycle and fishing.  Patient referred to Vocational Rehabilitation.     Interview Behavior: Patient and Caregiver presents as alert and oriented x 4, pleasant, good eye contact, concentration/judgement good, average intelligence, calm, communicative, cooperative, and asking and answering questions appropriately.          [facility]   Transplant Social Work - Candidacy  Assessment/Plan:      Psychosocial Suitability: Patient presents as a suitable candidate for LVAD at this time.    Patient presents as unsuitable for transplant at this time due to not being tobacco free for at least 6 months.      Based on psychosocial risk factors, patient presents as medium risk, due to having limited financial resources and being tobacco free for less that 6 months at this time .     Protective factors include: No reported substance abuse or mental health issues.  No outstanding legal issues.  Pt has a supportive family and adequate caregiver plan in place.      Recommendations/Additional Comments: Encouraged fundraising.  Provided SW resource booklet.  Also, discussed local lodging options for transplant, including [place] apts.     Final determination on suitability to be determined by transplant selection committee.     Alejandra Wilkes, [facility]

## 2023-11-18 NOTE — ASSESSMENT & PLAN NOTE
-A1c 7.6, not insulin dependent  - MDSSI  -diabetic diet ordered/Boost glucose control   - Endocrine following, appreciate assistance with blood glucose management

## 2023-11-19 LAB
ALBUMIN SERPL BCP-MCNC: 3 G/DL (ref 3.5–5.2)
ALBUMIN SERPL BCP-MCNC: 3 G/DL (ref 3.5–5.2)
ALLENS TEST: ABNORMAL
ALLENS TEST: ABNORMAL
ALP SERPL-CCNC: 74 U/L (ref 55–135)
ALP SERPL-CCNC: 83 U/L (ref 55–135)
ALT SERPL W/O P-5'-P-CCNC: 17 U/L (ref 10–44)
ALT SERPL W/O P-5'-P-CCNC: 19 U/L (ref 10–44)
ANION GAP SERPL CALC-SCNC: 13 MMOL/L (ref 8–16)
ANION GAP SERPL CALC-SCNC: 14 MMOL/L (ref 8–16)
APTT PPP: 56.7 SEC (ref 21–32)
AST SERPL-CCNC: 12 U/L (ref 10–40)
AST SERPL-CCNC: 13 U/L (ref 10–40)
BASOPHILS # BLD AUTO: 0.03 K/UL (ref 0–0.2)
BASOPHILS # BLD AUTO: 0.04 K/UL (ref 0–0.2)
BASOPHILS NFR BLD: 0.5 % (ref 0–1.9)
BASOPHILS NFR BLD: 0.7 % (ref 0–1.9)
BILIRUB SERPL-MCNC: 0.4 MG/DL (ref 0.1–1)
BILIRUB SERPL-MCNC: 0.6 MG/DL (ref 0.1–1)
BIPAP: 0
BUN SERPL-MCNC: 85 MG/DL (ref 8–23)
BUN SERPL-MCNC: 87 MG/DL (ref 8–23)
CALCIUM SERPL-MCNC: 8.9 MG/DL (ref 8.7–10.5)
CALCIUM SERPL-MCNC: 9 MG/DL (ref 8.7–10.5)
CHLORIDE SERPL-SCNC: 94 MMOL/L (ref 95–110)
CHLORIDE SERPL-SCNC: 97 MMOL/L (ref 95–110)
CO2 SERPL-SCNC: 23 MMOL/L (ref 23–29)
CO2 SERPL-SCNC: 26 MMOL/L (ref 23–29)
CREAT SERPL-MCNC: 2.9 MG/DL (ref 0.5–1.4)
CREAT SERPL-MCNC: 3.1 MG/DL (ref 0.5–1.4)
DELSYS: ABNORMAL
DELSYS: ABNORMAL
DIFFERENTIAL METHOD BLD: ABNORMAL
DIFFERENTIAL METHOD BLD: ABNORMAL
EOSINOPHIL # BLD AUTO: 0.1 K/UL (ref 0–0.5)
EOSINOPHIL # BLD AUTO: 0.2 K/UL (ref 0–0.5)
EOSINOPHIL NFR BLD: 2 % (ref 0–8)
EOSINOPHIL NFR BLD: 3.2 % (ref 0–8)
ERYTHROCYTE [DISTWIDTH] IN BLOOD BY AUTOMATED COUNT: 14.5 % (ref 11.5–14.5)
ERYTHROCYTE [DISTWIDTH] IN BLOOD BY AUTOMATED COUNT: 14.5 % (ref 11.5–14.5)
EST. GFR  (NO RACE VARIABLE): 22 ML/MIN/1.73 M^2
EST. GFR  (NO RACE VARIABLE): 23.9 ML/MIN/1.73 M^2
FIO2: 36 %
GLUCOSE SERPL-MCNC: 122 MG/DL (ref 70–110)
GLUCOSE SERPL-MCNC: 268 MG/DL (ref 70–110)
HCO3 UR-SCNC: 24.5 MMOL/L (ref 24–28)
HCO3 UR-SCNC: 25.7 MMOL/L (ref 24–28)
HCT VFR BLD AUTO: 21.4 % (ref 40–54)
HCT VFR BLD AUTO: 21.8 % (ref 40–54)
HGB BLD-MCNC: 7 G/DL (ref 14–18)
HGB BLD-MCNC: 7.1 G/DL (ref 14–18)
IMM GRANULOCYTES # BLD AUTO: 0.03 K/UL (ref 0–0.04)
IMM GRANULOCYTES # BLD AUTO: 0.03 K/UL (ref 0–0.04)
IMM GRANULOCYTES NFR BLD AUTO: 0.5 % (ref 0–0.5)
IMM GRANULOCYTES NFR BLD AUTO: 0.5 % (ref 0–0.5)
LPM: 4
LYMPHOCYTES # BLD AUTO: 0.6 K/UL (ref 1–4.8)
LYMPHOCYTES # BLD AUTO: 1 K/UL (ref 1–4.8)
LYMPHOCYTES NFR BLD: 16.5 % (ref 18–48)
LYMPHOCYTES NFR BLD: 9.8 % (ref 18–48)
MAGNESIUM SERPL-MCNC: 2.5 MG/DL (ref 1.6–2.6)
MCH RBC QN AUTO: 28.8 PG (ref 27–31)
MCH RBC QN AUTO: 29.2 PG (ref 27–31)
MCHC RBC AUTO-ENTMCNC: 32.6 G/DL (ref 32–36)
MCHC RBC AUTO-ENTMCNC: 32.7 G/DL (ref 32–36)
MCV RBC AUTO: 88 FL (ref 82–98)
MCV RBC AUTO: 90 FL (ref 82–98)
METHEMOGLOBIN: 0.5 % (ref 0–3)
MONOCYTES # BLD AUTO: 0.4 K/UL (ref 0.3–1)
MONOCYTES # BLD AUTO: 0.5 K/UL (ref 0.3–1)
MONOCYTES NFR BLD: 6.4 % (ref 4–15)
MONOCYTES NFR BLD: 7.4 % (ref 4–15)
NEUTROPHILS # BLD AUTO: 4.5 K/UL (ref 1.8–7.7)
NEUTROPHILS # BLD AUTO: 4.9 K/UL (ref 1.8–7.7)
NEUTROPHILS NFR BLD: 71.9 % (ref 38–73)
NEUTROPHILS NFR BLD: 80.6 % (ref 38–73)
NRBC BLD-RTO: 0 /100 WBC
NRBC BLD-RTO: 0 /100 WBC
PCO2 BLDA: 40.5 MMHG (ref 35–45)
PCO2 BLDA: 41.9 MMHG (ref 35–45)
PH SMN: 7.38 [PH] (ref 7.35–7.45)
PH SMN: 7.41 [PH] (ref 7.35–7.45)
PHOSPHATE SERPL-MCNC: 4.4 MG/DL (ref 2.7–4.5)
PLATELET # BLD AUTO: 311 K/UL (ref 150–450)
PLATELET # BLD AUTO: 312 K/UL (ref 150–450)
PMV BLD AUTO: 10.7 FL (ref 9.2–12.9)
PMV BLD AUTO: 9.9 FL (ref 9.2–12.9)
PO2 BLDA: 30 MMHG (ref 40–60)
PO2 BLDA: 33 MMHG (ref 40–60)
POC BE: -1 MMOL/L
POC BE: 1 MMOL/L
POC METHB: 0.5 % (ref 0–3)
POC PERFORMED BY: NORMAL
POC SATURATED O2: 57 % (ref 95–100)
POC SATURATED O2: 63 % (ref 95–100)
POC TCO2: 26 MMOL/L (ref 24–29)
POC TCO2: 27 MMOL/L (ref 24–29)
POCT GLUCOSE: 200 MG/DL (ref 70–110)
POCT GLUCOSE: 258 MG/DL (ref 70–110)
POCT GLUCOSE: 280 MG/DL (ref 70–110)
POCT GLUCOSE: 343 MG/DL (ref 70–110)
POCT GLUCOSE: 357 MG/DL (ref 70–110)
POTASSIUM SERPL-SCNC: 3.8 MMOL/L (ref 3.5–5.1)
POTASSIUM SERPL-SCNC: 4.2 MMOL/L (ref 3.5–5.1)
PROT SERPL-MCNC: 6.9 G/DL (ref 6–8.4)
PROT SERPL-MCNC: 7.2 G/DL (ref 6–8.4)
RBC # BLD AUTO: 2.43 M/UL (ref 4.6–6.2)
RBC # BLD AUTO: 2.43 M/UL (ref 4.6–6.2)
SAMPLE: ABNORMAL
SAMPLE: ABNORMAL
SITE: ABNORMAL
SITE: ABNORMAL
SODIUM SERPL-SCNC: 131 MMOL/L (ref 136–145)
SODIUM SERPL-SCNC: 136 MMOL/L (ref 136–145)
SPECIMEN SOURCE: NORMAL
WBC # BLD AUTO: 6.12 K/UL (ref 3.9–12.7)
WBC # BLD AUTO: 6.23 K/UL (ref 3.9–12.7)

## 2023-11-19 PROCEDURE — 25000003 PHARM REV CODE 250: Mod: NTX | Performed by: STUDENT IN AN ORGANIZED HEALTH CARE EDUCATION/TRAINING PROGRAM

## 2023-11-19 PROCEDURE — 25000003 PHARM REV CODE 250: Mod: NTX | Performed by: REGISTERED NURSE

## 2023-11-19 PROCEDURE — 85730 THROMBOPLASTIN TIME PARTIAL: CPT | Mod: NTX | Performed by: STUDENT IN AN ORGANIZED HEALTH CARE EDUCATION/TRAINING PROGRAM

## 2023-11-19 PROCEDURE — 83735 ASSAY OF MAGNESIUM: CPT | Mod: NTX | Performed by: STUDENT IN AN ORGANIZED HEALTH CARE EDUCATION/TRAINING PROGRAM

## 2023-11-19 PROCEDURE — 99291 CRITICAL CARE FIRST HOUR: CPT | Mod: NTX,,, | Performed by: INTERNAL MEDICINE

## 2023-11-19 PROCEDURE — 85025 COMPLETE CBC W/AUTO DIFF WBC: CPT | Mod: NTX | Performed by: STUDENT IN AN ORGANIZED HEALTH CARE EDUCATION/TRAINING PROGRAM

## 2023-11-19 PROCEDURE — 85025 COMPLETE CBC W/AUTO DIFF WBC: CPT | Mod: 91,NTX | Performed by: STUDENT IN AN ORGANIZED HEALTH CARE EDUCATION/TRAINING PROGRAM

## 2023-11-19 PROCEDURE — 63600367 HC NITRIC OXIDE PER HOUR: Mod: NTX

## 2023-11-19 PROCEDURE — 80053 COMPREHEN METABOLIC PANEL: CPT | Mod: 91,NTX | Performed by: STUDENT IN AN ORGANIZED HEALTH CARE EDUCATION/TRAINING PROGRAM

## 2023-11-19 PROCEDURE — 25000003 PHARM REV CODE 250: Mod: NTX | Performed by: PHYSICIAN ASSISTANT

## 2023-11-19 PROCEDURE — 27000221 HC OXYGEN, UP TO 24 HOURS: Mod: NTX

## 2023-11-19 PROCEDURE — 83050 HGB METHEMOGLOBIN QUAN: CPT | Mod: NTX

## 2023-11-19 PROCEDURE — 84100 ASSAY OF PHOSPHORUS: CPT | Mod: NTX | Performed by: STUDENT IN AN ORGANIZED HEALTH CARE EDUCATION/TRAINING PROGRAM

## 2023-11-19 PROCEDURE — 99900035 HC TECH TIME PER 15 MIN (STAT): Mod: NTX

## 2023-11-19 PROCEDURE — 63600175 PHARM REV CODE 636 W HCPCS: Mod: NTX | Performed by: STUDENT IN AN ORGANIZED HEALTH CARE EDUCATION/TRAINING PROGRAM

## 2023-11-19 PROCEDURE — 94761 N-INVAS EAR/PLS OXIMETRY MLT: CPT | Mod: NTX,XB

## 2023-11-19 PROCEDURE — 82803 BLOOD GASES ANY COMBINATION: CPT | Mod: NTX

## 2023-11-19 PROCEDURE — 20000000 HC ICU ROOM: Mod: NTX

## 2023-11-19 RX ORDER — AMOXICILLIN 250 MG
2 CAPSULE ORAL 2 TIMES DAILY PRN
Status: DISCONTINUED | OUTPATIENT
Start: 2023-11-19 | End: 2023-12-06

## 2023-11-19 RX ORDER — INSULIN ASPART 100 [IU]/ML
12 INJECTION, SOLUTION INTRAVENOUS; SUBCUTANEOUS
Status: DISCONTINUED | OUTPATIENT
Start: 2023-11-19 | End: 2023-11-30

## 2023-11-19 RX ORDER — POTASSIUM CHLORIDE 20 MEQ/1
20 TABLET, EXTENDED RELEASE ORAL ONCE
Status: COMPLETED | OUTPATIENT
Start: 2023-11-19 | End: 2023-11-19

## 2023-11-19 RX ADMIN — INSULIN ASPART 10 UNITS: 100 INJECTION, SOLUTION INTRAVENOUS; SUBCUTANEOUS at 07:11

## 2023-11-19 RX ADMIN — INSULIN ASPART 12 UNITS: 100 INJECTION, SOLUTION INTRAVENOUS; SUBCUTANEOUS at 12:11

## 2023-11-19 RX ADMIN — PANTOPRAZOLE SODIUM 40 MG: 40 TABLET, DELAYED RELEASE ORAL at 05:11

## 2023-11-19 RX ADMIN — HEPARIN SODIUM AND DEXTROSE 16 UNITS/KG/HR: 10000; 5 INJECTION INTRAVENOUS at 05:11

## 2023-11-19 RX ADMIN — INSULIN ASPART 3 UNITS: 100 INJECTION, SOLUTION INTRAVENOUS; SUBCUTANEOUS at 04:11

## 2023-11-19 RX ADMIN — THERA TABS 1 TABLET: TAB at 09:11

## 2023-11-19 RX ADMIN — DOBUTAMINE IN DEXTROSE 5 MCG/KG/MIN: 400 INJECTION, SOLUTION INTRAVENOUS at 05:11

## 2023-11-19 RX ADMIN — INSULIN ASPART 12 UNITS: 100 INJECTION, SOLUTION INTRAVENOUS; SUBCUTANEOUS at 04:11

## 2023-11-19 RX ADMIN — HEPARIN SODIUM AND DEXTROSE 16 UNITS/KG/HR: 10000; 5 INJECTION INTRAVENOUS at 09:11

## 2023-11-19 RX ADMIN — HYDRALAZINE HYDROCHLORIDE 25 MG: 25 TABLET, FILM COATED ORAL at 02:11

## 2023-11-19 RX ADMIN — ACETAMINOPHEN 650 MG: 325 TABLET ORAL at 09:11

## 2023-11-19 RX ADMIN — GABAPENTIN 300 MG: 300 CAPSULE ORAL at 09:11

## 2023-11-19 RX ADMIN — INSULIN ASPART 4 UNITS: 100 INJECTION, SOLUTION INTRAVENOUS; SUBCUTANEOUS at 12:11

## 2023-11-19 RX ADMIN — HYDRALAZINE HYDROCHLORIDE 25 MG: 25 TABLET, FILM COATED ORAL at 09:11

## 2023-11-19 RX ADMIN — HYDRALAZINE HYDROCHLORIDE 25 MG: 25 TABLET, FILM COATED ORAL at 05:11

## 2023-11-19 RX ADMIN — Medication 400 MG: at 09:11

## 2023-11-19 RX ADMIN — ATORVASTATIN CALCIUM 40 MG: 40 TABLET, FILM COATED ORAL at 09:11

## 2023-11-19 RX ADMIN — AMIODARONE HYDROCHLORIDE 400 MG: 200 TABLET ORAL at 09:11

## 2023-11-19 RX ADMIN — INSULIN ASPART 1 UNITS: 100 INJECTION, SOLUTION INTRAVENOUS; SUBCUTANEOUS at 09:11

## 2023-11-19 RX ADMIN — POTASSIUM CHLORIDE 20 MEQ: 1500 TABLET, EXTENDED RELEASE ORAL at 05:11

## 2023-11-19 RX ADMIN — ASPIRIN 81 MG: 81 TABLET, COATED ORAL at 09:11

## 2023-11-19 RX ADMIN — FUROSEMIDE 10 MG/HR: 10 INJECTION, SOLUTION INTRAMUSCULAR; INTRAVENOUS at 09:11

## 2023-11-19 NOTE — PLAN OF CARE
"CICU Care Plan    POC reviewed with Radha Abbott and family at 1902. Pt verbalized understanding. Questions and concerns addressed. No acute events noted this evening. Pt progressing toward goals. Security measures in place, plan of care to continue. See below and flowsheets for full assessment and VS info.     CVP: 9, 8, 8  SvO2: 49    Neuro:  Michelle Coma Scale  Best Eye Response: 4-->(E4) spontaneous  Best Motor Response: 6-->(M6) obeys commands  Best Verbal Response: 5-->(V5) oriented  Michelle Coma Scale Score: 15  Assessment Qualifiers: patient not sedated/intubated, no eye obstruction present  Pupil PERRLA: yes     24 hr Temp:  [97.6 °F (36.4 °C)-98.5 °F (36.9 °C)]     CV:   Rhythm: normal sinus rhythm  BP goals:   MAP > 65    Resp:    Oxygen Concentration (%): 36  Plan:  4L NC w/ 10 PPM of Nitric Oxide    GI/:     Diet/Nutrition Received: consistent carb/diabetic diet  Last Bowel Movement: 11/18/23  Voiding Characteristics: voids spontaneously without difficulty    Intake/Output Summary (Last 24 hours) at 11/19/2023 0622  Last data filed at 11/19/2023 0532  Gross per 24 hour   Intake 1509.44 ml   Output 3050 ml   Net -1540.56 ml     Unmeasured Output  Urine Occurrence: 2  Stool Occurrence: 1      Labs/Accuchecks:  Recent Labs   Lab 11/19/23  0336   WBC 6.23   RBC 2.43*   HGB 7.0*   HCT 21.4*         Recent Labs   Lab 11/19/23  0336      K 3.8   CO2 26   CL 97   BUN 87*   CREATININE 3.1*   ALKPHOS 74   ALT 19   AST 13   BILITOT 0.6      Recent Labs   Lab 11/15/23  1601 11/16/23  0436 11/19/23  0336   INR 1.1  --   --    APTT  --    < > 56.7*    < > = values in this interval not displayed.    No results for input(s): "CPK", "CPKMB", "TROPONINI", "MB" in the last 168 hours.    Electrolytes: Electrolytes replaced  Accuchecks: ACHS    Gtts:   sodium chloride 0.9%      DOBUTamine 5 mcg/kg/min (11/19/23 4718)    furosemide (LASIX) 500 mg in 50 mL infusion (conc: 10 mg/mL) 30 mg/hr (11/19/23 3489)    " heparin (porcine) in D5W 16 Units/kg/hr (11/19/23 0555)    nitric oxide gas         LDA/Wounds:  Lines/Drains/Airways       Central Venous Catheter Line  Duration             Percutaneous Central Line Insertion/Assessment - Triple Lumen  11/14/23 1630 4 days              Peripheral Intravenous Line  Duration                  Midline Catheter Insertion/Assessment  - Single Lumen 11/14/23 1113 Left brachial vein 20g x 10cm 4 days                  Wounds: No  Wound care consulted: No  [place] assisted by family     Problem: Adult Inpatient Plan of Care  Goal: Plan of Care Review  Outcome: Ongoing, Progressing  Goal: Patient-Specific Goal (Individualized)  Outcome: Ongoing, Progressing  Goal: Absence of Hospital-Acquired Illness or Injury  Outcome: Ongoing, Progressing  Goal: Optimal Comfort and Wellbeing  Outcome: Ongoing, Progressing  Goal: Readiness for Transition of Care  Outcome: Ongoing, Progressing     Problem: Diabetes Comorbidity  Goal: Blood Glucose Level Within Targeted Range  Outcome: Ongoing, Progressing     Problem: Fluid and Electrolyte Imbalance (Acute Kidney Injury/Impairment)  Goal: Fluid and Electrolyte Balance  Outcome: Ongoing, Progressing     Problem: Oral Intake Inadequate (Acute Kidney Injury/Impairment)  Goal: Optimal Nutrition Intake  Outcome: Ongoing, Progressing     Problem: Renal Function Impairment (Acute Kidney Injury/Impairment)  Goal: Effective Renal Function  Outcome: Ongoing, Progressing     Problem: Infection  Goal: Absence of Infection Signs and Symptoms  Outcome: Ongoing, Progressing     Problem: Adjustment to Illness (Heart Failure)  Goal: Optimal Coping  Outcome: Ongoing, Progressing     Problem: Cardiac Output Decreased (Heart Failure)  Goal: Optimal Cardiac Output  Outcome: Ongoing, Progressing     Problem: Dysrhythmia (Heart Failure)  Goal: Stable Heart Rate and Rhythm  Outcome: Ongoing, Progressing     Problem: Fluid Imbalance (Heart Failure)  Goal: Fluid Balance  Outcome:  Ongoing, Progressing     Problem: Functional Ability Impaired (Heart Failure)  Goal: Optimal Functional Ability  Outcome: Ongoing, Progressing     Problem: Oral Intake Inadequate (Heart Failure)  Goal: Optimal Nutrition Intake  Outcome: Ongoing, Progressing     Problem: Respiratory Compromise (Heart Failure)  Goal: Effective Oxygenation and Ventilation  Outcome: Ongoing, Progressing     Problem: Sleep Disordered Breathing (Heart Failure)  Goal: Effective Breathing Pattern During Sleep  Outcome: Ongoing, Progressing     Problem: Cardiac Output Decreased  Goal: Effective Cardiac Output  Outcome: Ongoing, Progressing     Problem: Fall Injury Risk  Goal: Absence of Fall and Fall-Related Injury  Outcome: Ongoing, Progressing     Problem: Coping Ineffective  Goal: Effective Coping  Outcome: Ongoing, Progressing     Problem: Skin Injury Risk Increased  Goal: Skin Health and Integrity  Outcome: Ongoing, Progressing

## 2023-11-19 NOTE — NURSING
Nurses Note -- 4 Eyes      11/18/2023   7:04 PM      Skin assessed during: Daily Assessment      [x] No Altered Skin Integrity Present    []Prevention Measures Documented      [] Yes- Altered Skin Integrity Present or Discovered   [] LDA Added if Not in Epic (Describe Wound)   [] New Altered Skin Integrity was Present on Admit and Documented in LDA   [] Wound Image Taken    Wound Care Consulted? No    Attending Nurse: KYAW Goncalves   Second RN/Staff Member:  KYAW Pacheco

## 2023-11-19 NOTE — SUBJECTIVE & OBJECTIVE
Interval History: No events overnight, responded well to lasix gtt, appropriate UOP.  Feels well, does not have any orthostatic symptoms, renal indices stable.      Hemodynamics:   Support: 10 ppm,  5  CVP 4  SVO2 57  CO 6.44  CI 3.3      Continuous Infusions:   sodium chloride 0.9%      DOBUTamine 5 mcg/kg/min (11/19/23 1500)    furosemide (LASIX) 500 mg in 50 mL infusion (conc: 10 mg/mL) 20 mg/hr (11/19/23 1500)    heparin (porcine) in D5W 16 Units/kg/hr (11/19/23 1500)    nitric oxide gas       Scheduled Meds:   amiodarone  400 mg Oral Daily    aspirin  81 mg Oral Daily    atorvastatin  40 mg Oral Daily    hydrALAZINE  25 mg Oral Q8H    insulin aspart U-100  12 Units Subcutaneous TIDWM    magnesium oxide  400 mg Oral Daily    multivitamin  1 tablet Oral Daily    pantoprazole  40 mg Oral Before breakfast     PRN Meds:sodium chloride 0.9%, acetaminophen, dextrose 10%, dextrose 10%, gabapentin, glucagon (human recombinant), glucose, glucose, heparin (PORCINE), heparin (PORCINE), insulin aspart U-100, senna-docusate 8.6-50 mg    Review of patient's allergies indicates:  No Known Allergies  Objective:     Vital Signs (Most Recent):  Temp: 98 °F (36.7 °C) (11/19/23 1501)  Pulse: 88 (11/19/23 1501)  Resp: 19 (11/19/23 1501)  BP: 116/64 (11/19/23 1501)  SpO2: 100 % (11/19/23 1501) Vital Signs (24h Range):  Temp:  [98 °F (36.7 °C)-98.5 °F (36.9 °C)] 98 °F (36.7 °C)  Pulse:  [81-96] 88  Resp:  [13-27] 19  SpO2:  [98 %-100 %] 100 %  BP: (102-124)/(53-91) 116/64     Patient Vitals for the past 72 hrs (Last 3 readings):   Weight   11/19/23 0400 74.8 kg (164 lb 14.5 oz)   11/18/23 0400 76.5 kg (168 lb 10.4 oz)   11/17/23 0400 76 kg (167 lb 8.8 oz)     Body mass index is 22.37 kg/m².      Intake/Output Summary (Last 24 hours) at 11/19/2023 1521  Last data filed at 11/19/2023 1500  Gross per 24 hour   Intake 1464.19 ml   Output 2200 ml   Net -735.81 ml        Physical Exam  Constitutional:       Comments: Alert,  "Oriented, No acute distress   HENT:      Head: Normocephalic and atraumatic.   Eyes:      Conjunctiva/sclera: Conjunctivae normal.   Neck:      Vascular: No hepatojugular reflux or JVD.      Comments: JVP does not appear elevated.   Cardiovascular:      Rate and Rhythm: Normal rate and regular rhythm.   Pulmonary:      Comments: Normal effort, Clear to auscultation   Abdominal:      Comments: Soft, Non-tender, Non-distended   Musculoskeletal:      Cervical back: Normal range of motion.      Right lower leg: No edema.      Left lower leg: No edema.      Comments: No peripheral edema   Skin:     Comments: Dry, Intact, Warm, Capillary refill <2 seconds.    Neurological:      Mental Status: He is alert and oriented to person, place, and time.      Comments: Normal speech   Psychiatric:         Mood and Affect: Mood and affect normal.            Significant Labs:  CBC:  Recent Labs   Lab 11/18/23 0349 11/18/23 1236 11/19/23  0336   WBC 6.00 4.96 6.23   RBC 2.42* 3.68* 2.43*   HGB 7.1* 10.6* 7.0*   HCT 21.7* 32.7* 21.4*    234 312   MCV 90 89 88   MCH 29.3 28.8 28.8   MCHC 32.7 32.4 32.7     BNP:  Recent Labs   Lab 11/18/23  1236   *     CMP:  Recent Labs   Lab 11/18/23 0349 11/18/23  1236 11/19/23  0336   * 277* 122*   CALCIUM 8.8 9.2 9.0   ALBUMIN  --  3.1* 3.0*   PROT  --  7.3 6.9   * 131* 136   K 4.1 4.1 3.8   CO2 24 25 26   CL 97 93* 97   BUN 74* 77* 87*   CREATININE 3.0* 3.2* 3.1*   ALKPHOS  --  85 74   ALT  --  16 19   AST  --  13 13   BILITOT  --  0.6 0.6      Coagulation:   Recent Labs   Lab 11/15/23  1601 11/16/23  0436 11/17/23  0405 11/18/23  0349 11/19/23  0336   INR 1.1  --   --   --   --    APTT  --    < > 58.1* 56.1* 56.7*    < > = values in this interval not displayed.     LDH:  No results for input(s): "LDH" in the last 72 hours.  Microbiology:  Microbiology Results (last 7 days)       ** No results found for the last 168 hours. **            I have reviewed all pertinent " labs within the past 24 hours.    Estimated Creatinine Clearance: 26.5 mL/min (A) (based on SCr of 3.1 mg/dL (H)).    Diagnostic Results:  I have reviewed all pertinent imaging results/findings within the past 24 hours.

## 2023-11-19 NOTE — ASSESSMENT & PLAN NOTE
IVÁN on CKD2. Baseline Cr of 1.7-2.0.   Currently 3.0, trending down  Believed to be due to cardiorenal  - Trend along with CVP, renal indices stable with diuresis  - Hold ARNi, entresto    Plan:  Repeat hemodynamics and CMP this afternoon, will decrease lasix gtt to 20mg/hr

## 2023-11-19 NOTE — PLAN OF CARE
"  Care Plan    POC reviewed with Radha Abbott and family. Questions and concerns addressed. No acute events today. Cvp 4 this am svo2 57%, lasix gtt decreased to 20 mg/hr, pt up in chair all day today, eating all meals and drinking supplemental drinks, creat down to 2.9 this evening, will hold NPO after midnight for possible IABP, Pt progressing toward goals. Will continue to monitor. See below and flowsheets for full assessment and VS info.       Neuro:  [place] Coma Scale  Best Eye Response: 4-->(E4) spontaneous  Best Motor Response: 6-->(M6) obeys commands  Best Verbal Response: 5-->(V5) oriented  Michelle Coma Scale Score: 15  Assessment Qualifiers: patient not sedated/intubated, no eye obstruction present  Pupil PERRLA: yes  24 hr Temp:  [98 °F (36.7 °C)-98.5 °F (36.9 °C)]      CV:  Rhythm: normal sinus rhythm  DVT prophylaxis: VTE Required Core Measure: Pharmacological prophylaxis initiated/maintained    Resp:     Oxygen Concentration (%): 36    GI/:  GI prophylaxis: yes  Diet/Nutrition Received: consistent carb/diabetic diet  Last Bowel Movement: 11/18/23  Voiding Characteristics: voids spontaneously without difficulty   Intake/Output Summary (Last 24 hours) at 11/19/2023 1719  Last data filed at 11/19/2023 1700  Gross per 24 hour   Intake 1702.11 ml   Output 1900 ml   Net -197.89 ml       Labs/Accuchecks:  Recent Labs   Lab 11/19/23  1642   WBC 6.12   RBC 2.43*   HGB 7.1*   HCT 21.8*         Recent Labs   Lab 11/19/23  1642   *   K 4.2   CO2 23   CL 94*   BUN 85*   CREATININE 2.9*   ALKPHOS 83   ALT 17   AST 12   BILITOT 0.4      Recent Labs   Lab 11/15/23  1601 11/16/23  0436 11/19/23  0336   INR 1.1  --   --    APTT  --    < > 56.7*    < > = values in this interval not displayed.    No results for input(s): "CPK", "CPKMB", "TROPONINI", "MB" in the last 168 hours.    Electrolytes: N/A - electrolytes WDL  Accuchecks: ACHS    Gtts/LDAs:   sodium chloride 0.9%      DOBUTamine 5 mcg/kg/min " (11/19/23 1700)    furosemide (LASIX) 500 mg in 50 mL infusion (conc: 10 mg/mL) 20 mg/hr (11/19/23 1700)    heparin (porcine) in D5W 16 Units/kg/hr (11/19/23 1700)    nitric oxide gas         Lines/Drains/Airways       Central Venous Catheter Line  Duration             Percutaneous Central Line Insertion/Assessment - Triple Lumen  11/14/23 1630 5 days              Peripheral Intravenous Line  Duration                  Midline Catheter Insertion/Assessment  - Single Lumen 11/14/23 1113 Left brachial vein 20g x 10cm 5 days                    Skin/Wounds  Bathing/Skin Care: bath, complete;linen changed;electrode patches/site rotation;dressed/undressed (11/19/23 0701)  Wounds: No  Wound care consulted: No    Consults  Consults (From admission, onward)          Status Ordering Provider     Inpatient consult to Palliative Care  Once        Provider:  (Not yet assigned)    Completed ANYA MAY     Inpatient consult to Infectious Diseases  Once        Provider:  (Not yet assigned)    Completed REBEKAH ZAZUETA     Inpatient consult to Registered Dietitian/Nutritionist  Once        Provider:  (Not yet assigned)    Completed ASIM BERMAN     Inpatient consult to Interventional Cardiology  Once        Provider:  (Not yet assigned)    Completed ASIM BERMAN     Inpatient Consult to Transplant Coordinator  Once        Provider:  (Not yet assigned)    Acknowledged ASIM BERMAN     Inpatient Consult to Pre-VAD Coordinator  Once        Provider:  (Not yet assigned)    Acknowledged ASIM BERMAN     Inpatient consult to Endocrinology  Once        Provider:  (Not yet assigned)    Completed ASIM BERMAN

## 2023-11-19 NOTE — CARE UPDATE
Heart Transplant Care Update Note:    Hemodynamics:  CVP:9  SVO2: 49  CO: 3.7  CI: 1.9  SVR: 2164        Continuous Infusions:   sodium chloride 0.9%      DOBUTamine 5 mcg/kg/min (11/18/23 1905)    furosemide (LASIX) 500 mg in 50 mL infusion (conc: 10 mg/mL) 30 mg/hr (11/18/23 1927)    heparin (porcine) in D5W 16 Units/kg/hr (11/18/23 1905)    nitric oxide gas         Intake/Output Summary (Last 24 hours) at 11/18/2023 2132  Last data filed at 11/18/2023 2002  Gross per 24 hour   Intake 1487.79 ml   Output 2900 ml   Net -1412.21 ml           Plan:  Continue current management. No changes       Discussed with HTS Attending      Erin CONRAD MD  Cardiovascular Disease PGY IV  [facility]

## 2023-11-19 NOTE — CARE UPDATE
Care Update:     No acute events overnight. Patient on the CICU in room [id number] A. Blood glucose stable. BG above goal on current insulin regimen (SSI and prandial insulin). Steroid use- None. 6 Days Post-Op  Renal function- Abnormal - Creatinine 3.0   Vasopressors-  None  (multiple drips mixed in dextrose)      Diet diabetic 2800 Calorie; Fluid - 2000mL     POCT Glucose   Date Value Ref Range Status   11/19/2023 200 (H) 70 - 110 mg/dL Final   11/18/2023 227 (H) 70 - 110 mg/dL Final   11/18/2023 190 (H) 70 - 110 mg/dL Final   11/18/2023 282 (H) 70 - 110 mg/dL Final   11/18/2023 199 (H) 70 - 110 mg/dL Final   11/17/2023 237 (H) 70 - 110 mg/dL Final   11/17/2023 271 (H) 70 - 110 mg/dL Final   11/17/2023 197 (H) 70 - 110 mg/dL Final   11/17/2023 169 (H) 70 - 110 mg/dL Final   11/16/2023 156 (H) 70 - 110 mg/dL Final   11/16/2023 213 (H) 70 - 110 mg/dL Final   11/16/2023 314 (H) 70 - 110 mg/dL Final   11/16/2023 179 (H) 70 - 110 mg/dL Final     Lab Results   Component Value Date    HGBA1C 7.6 (H) 10/12/2023       Endocrinology consulted for BG management.   BG goal 140-180    Endocrine  Type 2 diabetes mellitus without complication, without long-term current use of insulin  BG goal 140-180     - Holding off basal insulin since FBG WNL for hospital goals.   - Novolog 12 units TID with meals (20% increase due to prandial blood glucose  above goal)   - Low Dose Correction Scale (given kidney function)  - BG monitoring ac/hs      ** Please notify Endocrine for any change and/or advance in diet**  ** Please call Endocrine for any BG related issues **    Discharge Planning:   TBD. Please notify endocrinology prior to discharge.      Erasmo Parrish DNP, FNP-C  Department of Endocrinology  Inpatient Glycemic Management

## 2023-11-19 NOTE — ASSESSMENT & PLAN NOTE
Pt with known ICM with an EF of 25% on home  presenting with decompensated HF.  Not in cardiogenic shock    [facility] 11/14: RA 14, PA 70/35, PCWP 32, CO 4.1, CI 2.1.     - Home  gtt at 5  - home GDMT: entresto 24/26mg BID (on hold 2/2 IVÁN), Hydralazine 25 q8h being continued  - home diuretics: Was taking lasix 40 bid  - CTS to follow regarding potential need for upgraded support to temporary MCS as bridge to advanced therapies.l   - Continue VAD/OHTx workup. On step 4  -Blood type A+  -PT/OT and nutrition   -Continue current support with  5, Ketty 10ppm    Plan:  Will deescalate lasix gtt to 20mg and reassess hemodynamics, monitor UOP and renal indices

## 2023-11-19 NOTE — PROGRESS NOTES
[facility] - Cardiac Intensive Care  Heart Transplant  Progress Note    Patient Name: Radha Abbott  MRN: [id number]  Admission Date: 11/9/2023  Hospital Length of Stay: 10 days  Attending Physician: Sandhya Price MD  Primary Care Provider: Vasu Kong MD  Principal Problem:Acute on chronic combined systolic and diastolic CHF, NYHA class 4    Subjective:   Interval History: No events overnight, responded well to lasix gtt, appropriate UOP.  Feels well, does not have any orthostatic symptoms, renal indices stable.      Hemodynamics:   Support: 10 ppm,  5  CVP 4  SVO2 57  CO 6.44  CI 3.3      Continuous Infusions:   sodium chloride 0.9%      DOBUTamine 5 mcg/kg/min (11/19/23 1500)    furosemide (LASIX) 500 mg in 50 mL infusion (conc: 10 mg/mL) 20 mg/hr (11/19/23 1500)    heparin (porcine) in D5W 16 Units/kg/hr (11/19/23 1500)    nitric oxide gas       Scheduled Meds:   amiodarone  400 mg Oral Daily    aspirin  81 mg Oral Daily    atorvastatin  40 mg Oral Daily    hydrALAZINE  25 mg Oral Q8H    insulin aspart U-100  12 Units Subcutaneous TIDWM    magnesium oxide  400 mg Oral Daily    multivitamin  1 tablet Oral Daily    pantoprazole  40 mg Oral Before breakfast     PRN Meds:sodium chloride 0.9%, acetaminophen, dextrose 10%, dextrose 10%, gabapentin, glucagon (human recombinant), glucose, glucose, heparin (PORCINE), heparin (PORCINE), insulin aspart U-100, senna-docusate 8.6-50 mg    Review of patient's allergies indicates:  No Known Allergies  Objective:     Vital Signs (Most Recent):  Temp: 98 °F (36.7 °C) (11/19/23 1501)  Pulse: 88 (11/19/23 1501)  Resp: 19 (11/19/23 1501)  BP: 116/64 (11/19/23 1501)  SpO2: 100 % (11/19/23 1501) Vital Signs (24h Range):  Temp:  [98 °F (36.7 °C)-98.5 °F (36.9 °C)] 98 °F (36.7 °C)  Pulse:  [81-96] 88  Resp:  [13-27] 19  SpO2:  [98 %-100 %] 100 %  BP: (102-124)/(53-91) 116/64     Patient Vitals for the past 72 hrs (Last 3 readings):   Weight   11/19/23 0400 74.8 kg (164  lb 14.5 oz)   11/18/23 0400 76.5 kg (168 lb 10.4 oz)   11/17/23 0400 76 kg (167 lb 8.8 oz)     Body mass index is 22.37 kg/m².      Intake/Output Summary (Last 24 hours) at 11/19/2023 1521  Last data filed at 11/19/2023 1500  Gross per 24 hour   Intake 1464.19 ml   Output 2200 ml   Net -735.81 ml        Physical Exam  Constitutional:       Comments: Alert, Oriented, No acute distress   HENT:      Head: Normocephalic and atraumatic.   Eyes:      Conjunctiva/sclera: Conjunctivae normal.   Neck:      Vascular: No hepatojugular reflux or JVD.      Comments: JVP does not appear elevated.   Cardiovascular:      Rate and Rhythm: Normal rate and regular rhythm.   Pulmonary:      Comments: Normal effort, Clear to auscultation   Abdominal:      Comments: Soft, Non-tender, Non-distended   Musculoskeletal:      Cervical back: Normal range of motion.      Right lower leg: No edema.      Left lower leg: No edema.      Comments: No peripheral edema   Skin:     Comments: Dry, Intact, Warm, Capillary refill <2 seconds.    Neurological:      Mental Status: He is alert and oriented to person, place, and time.      Comments: Normal speech   Psychiatric:         Mood and Affect: Mood and affect normal.            Significant Labs:  CBC:  Recent Labs   Lab 11/18/23 0349 11/18/23 1236 11/19/23  0336   WBC 6.00 4.96 6.23   RBC 2.42* 3.68* 2.43*   HGB 7.1* 10.6* 7.0*   HCT 21.7* 32.7* 21.4*    234 312   MCV 90 89 88   MCH 29.3 28.8 28.8   MCHC 32.7 32.4 32.7     BNP:  Recent Labs   Lab 11/18/23  1236   *     CMP:  Recent Labs   Lab 11/18/23 0349 11/18/23 1236 11/19/23  0336   * 277* 122*   CALCIUM 8.8 9.2 9.0   ALBUMIN  --  3.1* 3.0*   PROT  --  7.3 6.9   * 131* 136   K 4.1 4.1 3.8   CO2 24 25 26   CL 97 93* 97   BUN 74* 77* 87*   CREATININE 3.0* 3.2* 3.1*   ALKPHOS  --  85 74   ALT  --  16 19   AST  --  13 13   BILITOT  --  0.6 0.6      Coagulation:   Recent Labs   Lab 11/15/23  1601 11/16/23  7877  "11/17/23  0405 11/18/23  0349 11/19/23  0336   INR 1.1  --   --   --   --    APTT  --    < > 58.1* 56.1* 56.7*    < > = values in this interval not displayed.     LDH:  No results for input(s): "LDH" in the last 72 hours.  Microbiology:  Microbiology Results (last 7 days)       ** No results found for the last 168 hours. **            I have reviewed all pertinent labs within the past 24 hours.    Estimated Creatinine Clearance: 26.5 mL/min (A) (based on SCr of 3.1 mg/dL (H)).    Diagnostic Results:  I have reviewed all pertinent imaging results/findings within the past 24 hours.  Assessment and Plan:     63 year old male with hx of CAD s/p 3v CABG (unclear anatomy, 2009), ICM with a recent EF of 15-20% s/p ICD (medmauricio 2009), DM2 (a1c 7.7), HTN, HLD, Vfib on amio who presents to the ED with CC of SOB     Pt was recently [place] [facility] [place].  He was started on a Lasix gtt and did well.  Started on gDMT and discharged home on  5 with plans to follow up in HTS clinic for ongoing transplant evaluation at another facility.  He says that about a few days ago he started to notice LE swelling.  This turned into Nleson and orthopnea.  He says he can't walk to the bathroom without getting SOB.  He also complains of weight gain.  He takes torsemide 20mg BID at home and was told to trial additional Lasix which he did without any improvement.  He was rx metolazone but has not been filled.  He came to the ED     In the ED he was AF with stable VS on RA.  CBC showing chronic anemia.  CMP notable for acute on chronic CKD with baseline around 2.1 and Cr now 2.6.  BNP elevated.  Lactate neg.  CXR showing pulmonary edema.  I evaluated the pt at the bedside.  Bedside TTE showing CVP >15.  He was subsequently admitted to the CCU for diuresis.     He was continued on his home  and was started on a lasix gtt, which he responded well to overnight (net -1700cc. He feels much better this morning as well. Our transplant " coordinators have been working on insurance approval and he is now being transferred to [facility] service for transplant evaluation.     * Acute on chronic combined systolic and diastolic CHF, NYHA class 4  Pt with known ICM with an EF of 25% on home  presenting with decompensated HF.  Not in cardiogenic shock    [facility] 11/14: RA 14, PA 70/35, PCWP 32, CO 4.1, CI 2.1.     - Home  gtt at 5  - home GDMT: entresto 24/26mg BID (on hold 2/2 IVÁN), Hydralazine 25 q8h being continued  - home diuretics: Was taking lasix 40 bid  - CTS to follow regarding potential need for upgraded support to temporary MCS as bridge to advanced therapies.l   - Continue VAD/OHTx workup. On step 4  -Blood type A+  -PT/OT and nutrition   -Continue current support with  5, Ketty 10ppm    Plan:  Will deescalate lasix gtt to 20mg and reassess hemodynamics, monitor UOP and renal indices    PAF (paroxysmal atrial fibrillation)  Known hx of pAF. In sinus rhythm on admission, but 1 run of AF RVR overnight. He spontaneously converted.  - Continue home amiodarone 400mg qd  - Stop home Eliquis and continue heparin gtt while in the hospital.       IVÁN (acute kidney injury)  IVÁN on CKD2. Baseline Cr of 1.7-2.0.   Currently 3.0, trending down  Believed to be due to cardiorenal  - Trend along with CVP, renal indices stable with diuresis  - Hold ARNi, entresto    Plan:  Repeat hemodynamics and CMP this afternoon, will decrease lasix gtt to 20mg/hr    Type 2 diabetes mellitus without complication, without long-term current use of insulin  -A1c 7.6, not insulin dependent  - MDSSI  -diabetic diet ordered/Boost glucose control   - Endocrine following, appreciate assistance with blood glucose management    CAD (coronary artery disease)  -S/p 3vCABG in 2009  - continue home ASA, HI statin    Ventricular tachycardia  Hx VT s/p ICD placement (medtronic 2009)  - Continue home amio 400mg qd        Ghanshyam Gupta MD  Heart Transplant  [facility] - Cardiac Intensive Care     intact

## 2023-11-20 LAB
ABO + RH BLD: NORMAL
ALBUMIN SERPL BCP-MCNC: 2.9 G/DL (ref 3.5–5.2)
ALLENS TEST: ABNORMAL
ALP SERPL-CCNC: 77 U/L (ref 55–135)
ALT SERPL W/O P-5'-P-CCNC: 21 U/L (ref 10–44)
AMPHETAMINES SERPL QL: NEGATIVE
ANION GAP SERPL CALC-SCNC: 14 MMOL/L (ref 8–16)
APTT PPP: 42.2 SEC (ref 21–32)
APTT PPP: 54.1 SEC (ref 21–32)
AST SERPL-CCNC: 20 U/L (ref 10–40)
BARBITURATES SERPL QL SCN: NEGATIVE
BASOPHILS # BLD AUTO: 0.03 K/UL (ref 0–0.2)
BASOPHILS NFR BLD: 0.5 % (ref 0–1.9)
BENZODIAZ SERPL QL SCN: NEGATIVE
BILIRUB SERPL-MCNC: 0.5 MG/DL (ref 0.1–1)
BIPAP: 0
BLD GP AB SCN CELLS X3 SERPL QL: NORMAL
BUN SERPL-MCNC: 88 MG/DL (ref 8–23)
BZE SERPL QL: NEGATIVE
CALCIUM SERPL-MCNC: 8.9 MG/DL (ref 8.7–10.5)
CARBOXYTHC SERPL QL SCN: NEGATIVE
CARDIOLIPIN IGG SER IA-ACNC: <9.4 GPL (ref 0–14.99)
CARDIOLIPIN IGM SER IA-ACNC: 40 MPL (ref 0–12.49)
CHLORIDE SERPL-SCNC: 94 MMOL/L (ref 95–110)
CO2 SERPL-SCNC: 24 MMOL/L (ref 23–29)
CREAT SERPL-MCNC: 2.8 MG/DL (ref 0.5–1.4)
DELSYS: ABNORMAL
DELSYS: ABNORMAL
DIFFERENTIAL METHOD BLD: ABNORMAL
EOSINOPHIL # BLD AUTO: 0.2 K/UL (ref 0–0.5)
EOSINOPHIL NFR BLD: 3.6 % (ref 0–8)
ERYTHROCYTE [DISTWIDTH] IN BLOOD BY AUTOMATED COUNT: 14.2 % (ref 11.5–14.5)
EST. GFR  (NO RACE VARIABLE): 24.9 ML/MIN/1.73 M^2
ETHANOL SERPL QL SCN: NEGATIVE
F2 C.20210G>A GENO BLD/T: NEGATIVE
F5 P.R506Q BLD/T QL: NEGATIVE
FIO2: 36 %
GLUCOSE SERPL-MCNC: 226 MG/DL (ref 70–110)
HCO3 UR-SCNC: 24.3 MMOL/L (ref 24–28)
HCO3 UR-SCNC: 24.6 MMOL/L (ref 24–28)
HCO3 UR-SCNC: 24.8 MMOL/L (ref 24–28)
HCT VFR BLD AUTO: 20.7 % (ref 40–54)
HCT VFR BLD CALC: 21 %PCV (ref 36–54)
HGB BLD-MCNC: 6.4 G/DL (ref 14–18)
IMM GRANULOCYTES # BLD AUTO: 0.04 K/UL (ref 0–0.04)
IMM GRANULOCYTES NFR BLD AUTO: 0.7 % (ref 0–0.5)
LACTATE SERPL-SCNC: 1.3 MMOL/L (ref 0.5–2.2)
LEFT ABI: 0.76
LEFT ARM BP: 102 MMHG
LEFT DORSALIS PEDIS: 78 MMHG
LEFT POSTERIOR TIBIAL: 72 MMHG
LPM: 4
LYMPHOCYTES # BLD AUTO: 0.9 K/UL (ref 1–4.8)
LYMPHOCYTES NFR BLD: 15.8 % (ref 18–48)
MAGNESIUM SERPL-MCNC: 2.4 MG/DL (ref 1.6–2.6)
MCH RBC QN AUTO: 27.8 PG (ref 27–31)
MCHC RBC AUTO-ENTMCNC: 30.9 G/DL (ref 32–36)
MCV RBC AUTO: 90 FL (ref 82–98)
METHADONE SERPL QL SCN: NEGATIVE
METHEMOGLOBIN: 0.5 % (ref 0–3)
MONOCYTES # BLD AUTO: 0.4 K/UL (ref 0.3–1)
MONOCYTES NFR BLD: 7.2 % (ref 4–15)
NEUTROPHILS # BLD AUTO: 4 K/UL (ref 1.8–7.7)
NEUTROPHILS NFR BLD: 72.2 % (ref 38–73)
NRBC BLD-RTO: 0 /100 WBC
OPIATES SERPL QL SCN: NEGATIVE
PCO2 BLDA: 41.1 MMHG (ref 35–45)
PCO2 BLDA: 41.9 MMHG (ref 35–45)
PCO2 BLDA: 42.4 MMHG (ref 35–45)
PCP SERPL QL SCN: NEGATIVE
PH SMN: 7.37 [PH] (ref 7.35–7.45)
PH SMN: 7.38 [PH] (ref 7.35–7.45)
PH SMN: 7.38 [PH] (ref 7.35–7.45)
PHOSPHATE SERPL-MCNC: 3.8 MG/DL (ref 2.7–4.5)
PLATELET # BLD AUTO: 267 K/UL (ref 150–450)
PMV BLD AUTO: 10.8 FL (ref 9.2–12.9)
PO2 BLDA: 20 MMHG (ref 40–60)
PO2 BLDA: 23 MMHG (ref 40–60)
PO2 BLDA: 30 MMHG (ref 40–60)
POC BE: -1 MMOL/L
POC BE: 0 MMOL/L
POC BE: 0 MMOL/L
POC METHB: 0.5 % (ref 0–3)
POC PERFORMED BY: NORMAL
POC SATURATED O2: 31 % (ref 95–100)
POC SATURATED O2: 40 % (ref 95–100)
POC SATURATED O2: 57 % (ref 95–100)
POC TCO2: 26 MMOL/L (ref 24–29)
POC TEMPERATURE: 37 C
POCT GLUCOSE: 242 MG/DL (ref 70–110)
POCT GLUCOSE: 321 MG/DL (ref 70–110)
POCT GLUCOSE: 365 MG/DL (ref 70–110)
POTASSIUM SERPL-SCNC: 4.2 MMOL/L (ref 3.5–5.1)
PROPOXYPH SERPL QL: NEGATIVE
PROT C AG ACT/NOR PPP IA: 114 % (ref 72–160)
PROT S AG ACT/NOR PPP IA: 127 % (ref 50–140)
PROT SERPL-MCNC: 6.5 G/DL (ref 6–8.4)
RBC # BLD AUTO: 2.3 M/UL (ref 4.6–6.2)
RIGHT ABI: 0.75
RIGHT DORSALIS PEDIS: 70 MMHG
RIGHT POSTERIOR TIBIAL: 76 MMHG
SAMPLE: ABNORMAL
SITE: ABNORMAL
SODIUM SERPL-SCNC: 132 MMOL/L (ref 136–145)
SPECIMEN OUTDATE: NORMAL
SPECIMEN SOURCE: NORMAL
WBC # BLD AUTO: 5.57 K/UL (ref 3.9–12.7)

## 2023-11-20 PROCEDURE — 83605 ASSAY OF LACTIC ACID: CPT | Mod: NTX | Performed by: STUDENT IN AN ORGANIZED HEALTH CARE EDUCATION/TRAINING PROGRAM

## 2023-11-20 PROCEDURE — 27000221 HC OXYGEN, UP TO 24 HOURS: Mod: NTX

## 2023-11-20 PROCEDURE — 86901 BLOOD TYPING SEROLOGIC RH(D): CPT | Mod: NTX | Performed by: STUDENT IN AN ORGANIZED HEALTH CARE EDUCATION/TRAINING PROGRAM

## 2023-11-20 PROCEDURE — 25000003 PHARM REV CODE 250: Mod: NTX | Performed by: PHYSICIAN ASSISTANT

## 2023-11-20 PROCEDURE — 85025 COMPLETE CBC W/AUTO DIFF WBC: CPT | Mod: NTX | Performed by: STUDENT IN AN ORGANIZED HEALTH CARE EDUCATION/TRAINING PROGRAM

## 2023-11-20 PROCEDURE — 25000003 PHARM REV CODE 250: Mod: NTX | Performed by: INTERNAL MEDICINE

## 2023-11-20 PROCEDURE — 99223 1ST HOSP IP/OBS HIGH 75: CPT | Mod: NTX,,, | Performed by: INTERNAL MEDICINE

## 2023-11-20 PROCEDURE — 25000003 PHARM REV CODE 250: Mod: NTX | Performed by: STUDENT IN AN ORGANIZED HEALTH CARE EDUCATION/TRAINING PROGRAM

## 2023-11-20 PROCEDURE — 99900035 HC TECH TIME PER 15 MIN (STAT): Mod: NTX

## 2023-11-20 PROCEDURE — 25000003 PHARM REV CODE 250: Mod: NTX | Performed by: REGISTERED NURSE

## 2023-11-20 PROCEDURE — 93451 RIGHT HEART CATH: CPT | Mod: NTX | Performed by: INTERNAL MEDICINE

## 2023-11-20 PROCEDURE — 63600367 HC NITRIC OXIDE PER HOUR: Mod: NTX

## 2023-11-20 PROCEDURE — 4A023N6 MEASUREMENT OF CARDIAC SAMPLING AND PRESSURE, RIGHT HEART, PERCUTANEOUS APPROACH: ICD-10-PCS | Performed by: INTERNAL MEDICINE

## 2023-11-20 PROCEDURE — 93451 RIGHT HEART CATH: CPT | Mod: 26,NTX,, | Performed by: INTERNAL MEDICINE

## 2023-11-20 PROCEDURE — 94761 N-INVAS EAR/PLS OXIMETRY MLT: CPT | Mod: NTX,XB

## 2023-11-20 PROCEDURE — 63600175 PHARM REV CODE 636 W HCPCS: Mod: NTX | Performed by: STUDENT IN AN ORGANIZED HEALTH CARE EDUCATION/TRAINING PROGRAM

## 2023-11-20 PROCEDURE — 85730 THROMBOPLASTIN TIME PARTIAL: CPT | Mod: NTX | Performed by: STUDENT IN AN ORGANIZED HEALTH CARE EDUCATION/TRAINING PROGRAM

## 2023-11-20 PROCEDURE — 99291 CRITICAL CARE FIRST HOUR: CPT | Mod: 25,NTX,, | Performed by: REGISTERED NURSE

## 2023-11-20 PROCEDURE — 63600175 PHARM REV CODE 636 W HCPCS: Mod: NTX | Performed by: REGISTERED NURSE

## 2023-11-20 PROCEDURE — 84100 ASSAY OF PHOSPHORUS: CPT | Mod: NTX | Performed by: STUDENT IN AN ORGANIZED HEALTH CARE EDUCATION/TRAINING PROGRAM

## 2023-11-20 PROCEDURE — 99233 SBSQ HOSP IP/OBS HIGH 50: CPT | Mod: 25,NTX,, | Performed by: INTERNAL MEDICINE

## 2023-11-20 PROCEDURE — 63600175 PHARM REV CODE 636 W HCPCS: Mod: NTX | Performed by: NURSE PRACTITIONER

## 2023-11-20 PROCEDURE — 83050 HGB METHEMOGLOBIN QUAN: CPT | Mod: NTX

## 2023-11-20 PROCEDURE — 25000003 PHARM REV CODE 250: Mod: NTX | Performed by: NURSE PRACTITIONER

## 2023-11-20 PROCEDURE — 85730 THROMBOPLASTIN TIME PARTIAL: CPT | Mod: 91,NTX | Performed by: INTERNAL MEDICINE

## 2023-11-20 PROCEDURE — 27201423 OPTIME MED/SURG SUP & DEVICES STERILE SUPPLY: Mod: NTX | Performed by: INTERNAL MEDICINE

## 2023-11-20 PROCEDURE — 83735 ASSAY OF MAGNESIUM: CPT | Mod: NTX | Performed by: STUDENT IN AN ORGANIZED HEALTH CARE EDUCATION/TRAINING PROGRAM

## 2023-11-20 PROCEDURE — 82803 BLOOD GASES ANY COMBINATION: CPT | Mod: NTX

## 2023-11-20 PROCEDURE — C1894 INTRO/SHEATH, NON-LASER: HCPCS | Mod: NTX | Performed by: INTERNAL MEDICINE

## 2023-11-20 PROCEDURE — 20000000 HC ICU ROOM: Mod: NTX

## 2023-11-20 PROCEDURE — 80053 COMPREHEN METABOLIC PANEL: CPT | Mod: NTX | Performed by: STUDENT IN AN ORGANIZED HEALTH CARE EDUCATION/TRAINING PROGRAM

## 2023-11-20 PROCEDURE — 99232 SBSQ HOSP IP/OBS MODERATE 35: CPT | Mod: NTX,,, | Performed by: NURSE PRACTITIONER

## 2023-11-20 RX ORDER — SODIUM CHLORIDE 9 MG/ML
INJECTION, SOLUTION INTRAVENOUS CONTINUOUS
Status: DISCONTINUED | OUTPATIENT
Start: 2023-11-20 | End: 2023-12-28

## 2023-11-20 RX ORDER — INSULIN ASPART 100 [IU]/ML
0-10 INJECTION, SOLUTION INTRAVENOUS; SUBCUTANEOUS
Status: DISCONTINUED | OUTPATIENT
Start: 2023-11-20 | End: 2023-12-01

## 2023-11-20 RX ORDER — FUROSEMIDE 10 MG/ML
120 INJECTION INTRAMUSCULAR; INTRAVENOUS ONCE
Status: COMPLETED | OUTPATIENT
Start: 2023-11-20 | End: 2023-11-20

## 2023-11-20 RX ORDER — LIDOCAINE HYDROCHLORIDE 20 MG/ML
INJECTION, SOLUTION EPIDURAL; INFILTRATION; INTRACAUDAL; PERINEURAL
Status: DISCONTINUED | OUTPATIENT
Start: 2023-11-20 | End: 2023-11-20 | Stop reason: HOSPADM

## 2023-11-20 RX ORDER — SODIUM CHLORIDE 0.9 % (FLUSH) 0.9 %
10 SYRINGE (ML) INJECTION
Status: DISCONTINUED | OUTPATIENT
Start: 2023-11-20 | End: 2023-12-06

## 2023-11-20 RX ADMIN — HYDRALAZINE HYDROCHLORIDE 25 MG: 25 TABLET, FILM COATED ORAL at 09:11

## 2023-11-20 RX ADMIN — DOBUTAMINE IN DEXTROSE 5 MCG/KG/MIN: 400 INJECTION, SOLUTION INTRAVENOUS at 05:11

## 2023-11-20 RX ADMIN — HYDRALAZINE HYDROCHLORIDE 25 MG: 25 TABLET, FILM COATED ORAL at 05:11

## 2023-11-20 RX ADMIN — INSULIN ASPART 5 UNITS: 100 INJECTION, SOLUTION INTRAVENOUS; SUBCUTANEOUS at 11:11

## 2023-11-20 RX ADMIN — Medication 400 MG: at 09:11

## 2023-11-20 RX ADMIN — INSULIN ASPART 4 UNITS: 100 INJECTION, SOLUTION INTRAVENOUS; SUBCUTANEOUS at 04:11

## 2023-11-20 RX ADMIN — PANTOPRAZOLE SODIUM 40 MG: 40 TABLET, DELAYED RELEASE ORAL at 05:11

## 2023-11-20 RX ADMIN — INSULIN ASPART 2 UNITS: 100 INJECTION, SOLUTION INTRAVENOUS; SUBCUTANEOUS at 09:11

## 2023-11-20 RX ADMIN — HEPARIN SODIUM AND DEXTROSE 16 UNITS/KG/HR: 10000; 5 INJECTION INTRAVENOUS at 02:11

## 2023-11-20 RX ADMIN — ATORVASTATIN CALCIUM 40 MG: 40 TABLET, FILM COATED ORAL at 09:11

## 2023-11-20 RX ADMIN — HYDRALAZINE HYDROCHLORIDE 25 MG: 25 TABLET, FILM COATED ORAL at 02:11

## 2023-11-20 RX ADMIN — INSULIN DETEMIR 18 UNITS: 100 INJECTION, SOLUTION SUBCUTANEOUS at 09:11

## 2023-11-20 RX ADMIN — GABAPENTIN 300 MG: 300 CAPSULE ORAL at 09:11

## 2023-11-20 RX ADMIN — ASPIRIN 81 MG: 81 TABLET, COATED ORAL at 09:11

## 2023-11-20 RX ADMIN — SODIUM CHLORIDE: 9 INJECTION, SOLUTION INTRAVENOUS at 03:11

## 2023-11-20 RX ADMIN — CHLOROTHIAZIDE SODIUM 250 MG: 500 INJECTION, POWDER, LYOPHILIZED, FOR SOLUTION INTRAVENOUS at 03:11

## 2023-11-20 RX ADMIN — DOBUTAMINE IN DEXTROSE 5 MCG/KG/MIN: 400 INJECTION, SOLUTION INTRAVENOUS at 02:11

## 2023-11-20 RX ADMIN — CHLOROTHIAZIDE SODIUM 500 MG: 500 INJECTION, POWDER, LYOPHILIZED, FOR SOLUTION INTRAVENOUS at 09:11

## 2023-11-20 RX ADMIN — FUROSEMIDE 40 MG/HR: 10 INJECTION, SOLUTION INTRAMUSCULAR; INTRAVENOUS at 10:11

## 2023-11-20 RX ADMIN — INSULIN ASPART 12 UNITS: 100 INJECTION, SOLUTION INTRAVENOUS; SUBCUTANEOUS at 11:11

## 2023-11-20 RX ADMIN — ACETAMINOPHEN 650 MG: 325 TABLET ORAL at 05:11

## 2023-11-20 RX ADMIN — FUROSEMIDE 120 MG: 10 INJECTION, SOLUTION INTRAVENOUS at 10:11

## 2023-11-20 RX ADMIN — THERA TABS 1 TABLET: TAB at 09:11

## 2023-11-20 RX ADMIN — ACETAMINOPHEN 650 MG: 325 TABLET ORAL at 11:11

## 2023-11-20 RX ADMIN — INSULIN ASPART 12 UNITS: 100 INJECTION, SOLUTION INTRAVENOUS; SUBCUTANEOUS at 04:11

## 2023-11-20 RX ADMIN — AMIODARONE HYDROCHLORIDE 400 MG: 200 TABLET ORAL at 09:11

## 2023-11-20 RX ADMIN — FUROSEMIDE 20 MG/HR: 10 INJECTION, SOLUTION INTRAMUSCULAR; INTRAVENOUS at 05:11

## 2023-11-20 NOTE — SUBJECTIVE & OBJECTIVE
Interval History: No acute events overnight. Kidney function undulating. JVP visible on exam. Will give 1x dose of Diuril. Cath lab for leave in [place] today. Holding off on IABP. Will obtain repeat echo. On step 4 of pathway, working to present this week. Patient leaning towards LVAD.     Continuous Infusions:   sodium chloride 0.9%      DOBUTamine 5 mcg/kg/min (11/20/23 1101)    furosemide (LASIX) 500 mg in 50 mL infusion (conc: 10 mg/mL) 20 mg/hr (11/20/23 1101)    heparin (porcine) in D5W 16 Units/kg/hr (11/20/23 1101)    nitric oxide gas       Scheduled Meds:   amiodarone  400 mg Oral Daily    aspirin  81 mg Oral Daily    atorvastatin  40 mg Oral Daily    chlorothiazide (DIURIL) 250 mg in dextrose 5 % (D5W) 50 mL IVPB  250 mg Intravenous Once    hydrALAZINE  25 mg Oral Q8H    insulin aspart U-100  12 Units Subcutaneous TIDWM    magnesium oxide  400 mg Oral Daily    multivitamin  1 tablet Oral Daily    pantoprazole  40 mg Oral Before breakfast     PRN Meds:sodium chloride 0.9%, acetaminophen, dextrose 10%, dextrose 10%, gabapentin, glucagon (human recombinant), glucose, glucose, heparin (PORCINE), heparin (PORCINE), insulin aspart U-100, senna-docusate 8.6-50 mg    Review of patient's allergies indicates:  No Known Allergies  Objective:     Vital Signs (Most Recent):  Temp: 98.2 °F (36.8 °C) (11/20/23 0701)  Pulse: 88 (11/20/23 1101)  Resp: 18 (11/20/23 1101)  BP: 102/69 (11/20/23 1101)  SpO2: 100 % (11/20/23 1101) Vital Signs (24h Range):  Temp:  [97.6 °F (36.4 °C)-98.3 °F (36.8 °C)] 98.2 °F (36.8 °C)  Pulse:  [79-96] 88  Resp:  [10-27] 18  SpO2:  [96 %-100 %] 100 %  BP: ()/(53-79) 102/69     Patient Vitals for the past 72 hrs (Last 3 readings):   Weight   11/20/23 0400 75.5 kg (166 lb 7.2 oz)   11/19/23 0400 74.8 kg (164 lb 14.5 oz)   11/18/23 0400 76.5 kg (168 lb 10.4 oz)     Body mass index is 22.57 kg/m².      Intake/Output Summary (Last 24 hours) at 11/20/2023 1135  Last data filed at 11/20/2023  "1101  Gross per 24 hour   Intake 1566.72 ml   Output 800 ml   Net 766.72 ml       Hemodynamic Parameters:       Telemetry: NSR        Physical Exam  HENT:      Head: Normocephalic and atraumatic.   Eyes:      Pupils: Pupils are equal, round, and reactive to light.   Neck:      Comments: Jvp mid neck   Cardiovascular:      Rate and Rhythm: Normal rate and regular rhythm.   Pulmonary:      Effort: Pulmonary effort is normal.      Breath sounds: Normal breath sounds.   Abdominal:      General: Bowel sounds are normal.      Palpations: Abdomen is soft.   Musculoskeletal:      Cervical back: Normal range of motion and neck supple.      Right lower leg: No edema.      Left lower leg: No edema.   Skin:     General: Skin is warm and dry.   Neurological:      General: No focal deficit present.      Mental Status: He is oriented to person, place, and time.            Significant Labs:  CBC:  Recent Labs   Lab 11/19/23  0336 11/19/23  1642 11/20/23  0325 11/20/23  0855   WBC 6.23 6.12 5.57  --    RBC 2.43* 2.43* 2.30*  --    HGB 7.0* 7.1* 6.4*  --    HCT 21.4* 21.8* 20.7* 21*    311 267  --    MCV 88 90 90  --    MCH 28.8 29.2 27.8  --    MCHC 32.7 32.6 30.9*  --      BNP:  Recent Labs   Lab 11/18/23  1236   *     CMP:  Recent Labs   Lab 11/19/23  0336 11/19/23  1642 11/20/23  0325   * 268* 226*   CALCIUM 9.0 8.9 8.9   ALBUMIN 3.0* 3.0* 2.9*   PROT 6.9 7.2 6.5    131* 132*   K 3.8 4.2 4.2   CO2 26 23 24   CL 97 94* 94*   BUN 87* 85* 88*   CREATININE 3.1* 2.9* 2.8*   ALKPHOS 74 83 77   ALT 19 17 21   AST 13 12 20   BILITOT 0.6 0.4 0.5      Coagulation:   Recent Labs   Lab 11/15/23  1601 11/16/23  0436 11/18/23  0349 11/19/23  0336 11/20/23  0325   INR 1.1  --   --   --   --    APTT  --    < > 56.1* 56.7* 54.1*    < > = values in this interval not displayed.     LDH:  No results for input(s): "LDH" in the last 72 hours.  Microbiology:  Microbiology Results (last 7 days)       ** No results found for " the last 168 hours. **            BMP:   Recent Labs   Lab 11/20/23  0325   *   *   K 4.2   CL 94*   CO2 24   BUN 88*   CREATININE 2.8*   CALCIUM 8.9   MG 2.4     I have reviewed all pertinent labs within the past 24 hours.    Estimated Creatinine Clearance: 29.6 mL/min (A) (based on SCr of 2.8 mg/dL (H)).    Diagnostic Results:  I have reviewed all pertinent imaging results/findings within the past 24 hours.

## 2023-11-20 NOTE — ASSESSMENT & PLAN NOTE
Endocrinology consulted for BG management.   BG goal 140-180    WBD 0.6 units/kg/day (60/40 split)  - Levemir (Insulin Detemir) 18 units nightly   - Novolog (Insulin Aspart) 12 units TIDWM and prn for BG excursions [facility] SSI (150/25)  - BG checks AC/HS  - Hypoglycemia protocol in place    ** Please notify Endocrine for any change and/or advance in diet**  ** Please call Endocrine for any BG related issues **    Discharge Planning:   TBD. Please notify endocrinology prior to discharge.

## 2023-11-20 NOTE — PROGRESS NOTES
[facility] - Cardiac Intensive Care  Adult Nutrition  Progress Note    SUMMARY       Recommendations  1. Continue Diabetic/Cardiac diet-Fluid per MD  2. RD following    Goals: Meet % of EEN/EPN by RD f/u date  Nutrition Goal Status: progressing towards goal  Communication of RD Recs: POC    Assessment and Plan    Nutrition Problem  Increased protein needs      Related to (etiology):   Physiological needs      Signs and Symptoms (as evidenced by):   HF     Interventions (treatment strategy):  Collaboration of nutrition care w/ other providers      Nutrition Diagnosis Status:   Continues      Reason for Assessment    Reason For Assessment: RD follow-up  Diagnosis: cardiac disease  Relevant Medical History: -  Interdisciplinary Rounds: attended  General Information Comments: 11/20/23: Spoke w/ pt and pt's caregiver at bedside. Reports consuming 50-75% meal consumption. Reports receiving some outside food in the past. Emphasized protein. RD following.  Nutrition Discharge Planning: Cardiac diet education provided 11/15- no additional questions for me at this time.    Nutrition Risk Screen    Nutrition Risk Screen: no indicators present    Nutrition/Diet History    Spiritual, Cultural Beliefs, Evangelical Practices, Values that Affect Care: no  Food Allergies: NKFA    Anthropometrics    Temp: 97.9 °F (36.6 °C)  Height Method: Stated  Height: 6' (182.9 cm)  Height (inches): 72 in  Weight Method: Bed Scale  Weight: 75.5 kg (166 lb 7.2 oz)  Weight (lb): 166.45 lb  Ideal Body Weight (IBW), Male: 178 lb  % Ideal Body Weight, Male (lb): 99.33 %  BMI (Calculated): 22.6  BMI Grade: 18.5-24.9 - normal       Lab/Procedures/Meds    Pertinent Labs Reviewed: reviewed  Pertinent Labs Comments: Sodium 133, BUN 64, Cr 3.4, GFR 19.7, Glucose 223  Pertinent Medications Reviewed: reviewed  Pertinent Medications Comments: MVI    Estimated/Assessed Needs    Weight Used For Calorie Calculations: 77.5 kg (170 lb 13.7 oz)  Energy Calorie  Requirements (kcal): 1782  Energy Need Method: Kcal/kg (23 kcal/kg)  Protein Requirements: 93 g (1.2 g/kg)  Weight Used For Protein Calculations: 77.5 kg (170 lb 13.7 oz)   RDA Method (mL): 1782         Nutrition Prescription Ordered    Current Diet Order: Diabetic, Cardiac diet    Evaluation of Received Nutrient/Fluid Intake    I/O: -  Energy Calories Required: meeting needs  Protein Required: meeting needs  Fluid Required: meeting needs  Total Fluid Intake (mL/kg): 1 ml or fluid per MD  Tolerance: tolerating  % Intake of Estimated Energy Needs: 50 - 75 %  % Meal Intake: 50 - 75 %    Nutrition Risk    Level of Risk/Frequency of Follow-up: low (1x/week)    Monitor and Evaluation    Food and Nutrient Intake: energy intake, food and beverage intake  Food and Nutrient Adminstration: diet order  Knowledge/Beliefs/Attitudes: food and nutrition knowledge/skill, beliefs and attitudes  Physical Activity and Function: nutrition-related ADLs and IADLs, factors affecting access to physical activity  Anthropometric Measurements: height/length, weight, weight change, body mass index, growth pattern indices/percentile ranks  Biochemical Data, Medical Tests and Procedures: electrolyte and renal panel, gastrointestinal profile, glucose/endocrine profile, inflammatory profile, lipid profile  Nutrition-Focused Physical Findings: overall appearance, extremities, muscles and bones, head and eyes, skin     Nutrition Follow-Up    RD Follow-up?: Yes

## 2023-11-20 NOTE — DISCHARGE SUMMARY
[facility] Lab  Interventional Cardiology  Discharge Summary      Patient Name: Radha Abbott  MRN: [id number]  Admission Date: 11/9/2023  Hospital Length of Stay: 11 days  Discharge Date and Time:  11/20/2023 1:51 PM  Attending Physician: Sandhya Price MD  Discharging Provider: Sajan Casas MD  Primary Care Physician: Vaus Kong MD    HPI: patient with end stage HF here for RHC and leave in [place].    Procedure(s) (LRB):  INSERTION, CATHETER, RIGHT HEART (Right)  INSERTION, CATHETER, SWAN-ROLANDO, WITH IMAGING GUIDANCE     Indwelling Lines/Drains at time of discharge:  Lines/Drains/Airways       Central Venous Catheter Line  Duration             Percutaneous Central Line Insertion/Assessment - Triple Lumen  11/14/23 1630 5 days     Introducer Single Lumen 11/20/23 1330 Internal Jugular Left <1 day                    Hospital Course (synopsis of major diagnoses, care, treatment, and services provided during the course of the hospital stay): procedure done, via left IJ access. No complications. Back to CCU.    Consults (From admission, onward)          Status Ordering Provider     Inpatient consult to Palliative Care  Once        Provider:  (Not yet assigned)    Completed SANDHYA PRICE     Inpatient consult to Infectious Diseases  Once        Provider:  (Not yet assigned)    Completed REBEKAH ZAZUETA     Inpatient consult to Registered Dietitian/Nutritionist  Once        Provider:  (Not yet assigned)    Completed ASIM BREMAN     Inpatient consult to Interventional Cardiology  Once        Provider:  (Not yet assigned)    Completed ASIM BERMAN     Inpatient Consult to Transplant Coordinator  Once        Provider:  (Not yet assigned)    Acknowledged ASIM BERMAN     Inpatient Consult to Pre-VAD Coordinator  Once        Provider:  (Not yet assigned)    Acknowledged ASIM BERMAN     Inpatient consult to Endocrinology  Once        Provider:  (Not yet assigned)    Completed ASIM BERMAN             Significant Diagnostic Studies: Labs: CBC   Recent Labs   Lab 11/19/23  0336 11/19/23  1642 11/20/23  0325 11/20/23  0855   WBC 6.23 6.12 5.57  --    HGB 7.0* 7.1* 6.4*  --    HCT 21.4* 21.8* 20.7* 21*    311 267  --        Pending Diagnostic Studies:       Procedure Component Value Units Date/Time    Drugs of Abuse Screen, Blood [[id number]] Collected: 11/15/23 1601    Order Status: Sent Lab Status: In process Updated: 11/15/23 1607    Specimen: Blood     Echo Saline Bubble? No [[id number]]     Order Status: Sent Lab Status: No result     Factor 8 assay [[id number]] Collected: 11/15/23 1601    Order Status: Sent Lab Status: In process Updated: 11/15/23 1603    Specimen: Blood     Protein S antigen, free [[id number]] Collected: 11/15/23 1654    Order Status: Sent Lab Status: In process Updated: 11/15/23 1701    Specimen: Blood     Toxoplasma Gondii IgG [[id number]] Collected: 11/15/23 1601    Order Status: Sent Lab Status: In process Updated: 11/16/23 1025    Specimen: Blood     Von Willebrand multimeric [[id number]] Collected: 11/15/23 1654    Order Status: Sent Lab Status: In process Updated: 11/15/23 1701    Specimen: Blood           Final Active Diagnoses:    Diagnosis Date Noted POA    PRINCIPAL PROBLEM:  Acute on chronic combined systolic and diastolic CHF, NYHA class 4 [I50.43] 10/17/2023 Yes    Pre-transplant evaluation for heart transplant [Z01.818] 11/17/2023 Not Applicable    Palliative care encounter [Z51.5] 11/17/2023 Not Applicable    Advance care planning [Z71.89] 11/17/2023 Not Applicable    PAF (paroxysmal atrial fibrillation) [I48.0] 10/11/2023 Yes    IVÁN (acute kidney injury) [N17.9] 10/08/2023 Yes    Ventricular tachycardia [I47.20] 10/07/2023 Yes    Type 2 diabetes mellitus without complication, without long-term current use of insulin [E11.9] 10/07/2023 Yes    CAD (coronary artery disease) [I25.10] 10/07/2023 Yes      Problems Resolved During this Admission:    Diagnosis Date Noted  Date Resolved POA    Atrial fibrillation with tachycardic ventricular rate [I48.91] 10/15/2023 11/12/2023 Yes       Discharged Condition: good    Follow Up:    Patient Instructions:   No discharge procedures on file.  Medications:  None    Time spent on the discharge of patient: 20 minutes    Sajan Casas MD  Interventional Cardiology  [facility] - Cath Lab

## 2023-11-20 NOTE — SUBJECTIVE & OBJECTIVE
Past Medical History:   Diagnosis Date    CAD (coronary artery disease)     Diabetes mellitus     HFrEF (heart failure with reduced ejection fraction)     ICD (implantable cardioverter-defibrillator) in place     MI, old        Past Surgical History:   Procedure Laterality Date    CARDIAC SURGERY      RIGHT HEART CATHETERIZATION Right 10/10/2023    Procedure: INSERTION, CATHETER, RIGHT HEART;  Surgeon: Bin Gandhi MD;  Location: [facility] CATH LAB;  Service: Cardiology;  Laterality: Right;    RIGHT HEART CATHETERIZATION Right 10/13/2023    Procedure: INSERTION, CATHETER, RIGHT HEART;  Surgeon: Walter Mcintyre MD;  Location: [facility] CATH LAB;  Service: Cardiology;  Laterality: Right;    RIGHT HEART CATHETERIZATION  11/13/2023    RIGHT HEART CATHETERIZATION Right 11/13/2023    Procedure: INSERTION, CATHETER, RIGHT HEART;  Surgeon: Juventino Bermudez Jr., MD;  Location: [facility] CATH LAB;  Service: Cardiology;  Laterality: Right;       Review of patient's allergies indicates:  No Known Allergies    PTA Medications   Medication Sig    amiodarone (PACERONE) 400 MG tablet Take 1 tablet (400 mg total) by mouth once daily.    apixaban (ELIQUIS) 5 mg Tab Take 1 tablet (5 mg total) by mouth 2 (two) times daily.    aspirin (ECOTRIN) 81 MG EC tablet Take 81 mg by mouth once daily.    atorvastatin (LIPITOR) 40 MG tablet Take 40 mg by mouth.    DOBUTamine (DOBUTREX) 1,000 mg/250 mL (4,000 mcg/mL) infusion Inject 389.5 mcg/min into the vein continuous.    fish oil-omega-3 fatty acids 300-1,000 mg capsule Take 2 g by mouth once daily.    furosemide (LASIX) 40 MG tablet Take 1 tablet (40 mg total) by mouth 2 (two) times daily.    gabapentin (NEURONTIN) 300 MG capsule Take 300 mg by mouth nightly as needed.    multivitamin capsule Take 1 capsule by mouth once daily.    pantoprazole (PROTONIX) 40 MG tablet Take 1 tablet (40 mg total) by mouth before breakfast.    potassium chloride SA (K-DUR,KLOR-CON) 20 MEQ tablet Take 1 tablet (20 mEq  total) by mouth once daily.    sacubitriL-valsartan (ENTRESTO) 24-26 mg per tablet Take 1 tablet by mouth 2 (two) times daily.    metOLazone (ZAROXOLYN) 2.5 MG tablet Take 1 tablet (2.5 mg total) by mouth once daily. Thursday 11/9 and Saturday 11/10    torsemide (DEMADEX) 20 MG Tab Take 2 tablets (40 mg total) by mouth 2 (two) times a day.     Family History    None       Tobacco Use    Smoking status: Every Day     Current packs/day: 0.50     Types: Cigarettes    Smokeless tobacco: Not on file   Substance and Sexual Activity    Alcohol use: Yes     Comment: rarely    Drug use: No    Sexual activity: Not on file     Review of Systems   Constitutional: Negative.   HENT: Negative.     Cardiovascular:  Negative for chest pain, claudication, cyanosis, dyspnea on exertion, irregular heartbeat, leg swelling, near-syncope, orthopnea, palpitations and paroxysmal nocturnal dyspnea.   Respiratory: Negative.     Endocrine: Negative.    Musculoskeletal: Negative.    Gastrointestinal: Negative.    Genitourinary: Negative.    Neurological: Negative.      Objective:     Vital Signs (Most Recent):  Temp: 98.2 °F (36.8 °C) (11/20/23 0701)  Pulse: 78 (11/20/23 1201)  Resp: 20 (11/20/23 1201)  BP: (!) 94/55 (11/20/23 1201)  SpO2: 100 % (11/20/23 1230) Vital Signs (24h Range):  Temp:  [97.6 °F (36.4 °C)-98.3 °F (36.8 °C)] 98.2 °F (36.8 °C)  Pulse:  [78-95] 78  Resp:  [10-27] 20  SpO2:  [96 %-100 %] 100 %  BP: ()/(55-79) 94/55     Weight: 75.5 kg (166 lb 7.2 oz)  Body mass index is 22.57 kg/m².    SpO2: 100 %         Intake/Output Summary (Last 24 hours) at 11/20/2023 1522  Last data filed at 11/20/2023 1201  Gross per 24 hour   Intake 1142.07 ml   Output 500 ml   Net 642.07 ml       Lines/Drains/Airways       Central Venous Catheter Line  Duration              Introducer Single Lumen 11/20/23 1330 Internal Jugular Left <1 day     Introducer Single Lumen 11/20/23 1430 Internal Jugular Left <1 day              Peripheral  Intravenous Line  Duration                  Midline Catheter Insertion/Assessment  - Single Lumen 11/14/23 1113 Left brachial vein 20g x 10cm 6 days                     Physical Exam  Vitals and nursing note reviewed.   Constitutional:       General: He is not in acute distress.     Appearance: Normal appearance. He is normal weight. He is not ill-appearing or diaphoretic.   HENT:      Head: Normocephalic and atraumatic.   Eyes:      Pupils: Pupils are equal, round, and reactive to light.   Cardiovascular:      Rate and Rhythm: Normal rate and regular rhythm.      Pulses:           Radial pulses are 2+ on the right side and 2+ on the left side.        Femoral pulses are 3+ on the right side and 3+ on the left side.       Popliteal pulses are 2+ on the right side and 2+ on the left side.        Dorsalis pedis pulses are 2+ on the right side and 2+ on the left side.        Posterior tibial pulses are 1+ on the right side and 1+ on the left side.      Heart sounds: Normal heart sounds.      Comments: Posterior tibiales pulses heard with ultrasound, rest palpable  Pulmonary:      Effort: Pulmonary effort is normal.      Breath sounds: Normal breath sounds.   Abdominal:      General: Abdomen is flat. Bowel sounds are normal. There is no distension.      Palpations: Abdomen is soft. There is no mass.      Tenderness: There is no abdominal tenderness.   Musculoskeletal:         General: Normal range of motion.   Skin:     General: Skin is warm.      Capillary Refill: Capillary refill takes less than 2 seconds.   Neurological:      General: No focal deficit present.      Mental Status: He is alert and oriented to person, place, and time.            Significant Labs:     Recent Labs   Lab 11/19/23  0336 11/19/23  1642 11/20/23  0325 11/20/23  0855   WBC 6.23 6.12 5.57  --    HGB 7.0* 7.1* 6.4*  --    HCT 21.4* 21.8* 20.7* 21*    311 267  --        Recent Labs   Lab 11/18/23  0349 11/18/23  1236 11/19/23  0336  11/19/23  1642 11/20/23  0325   *   < > 136 131* 132*   K 4.1   < > 3.8 4.2 4.2   CL 97   < > 97 94* 94*   CO2 24   < > 26 23 24   BUN 74*   < > 87* 85* 88*   CREATININE 3.0*   < > 3.1* 2.9* 2.8*   CALCIUM 8.8   < > 9.0 8.9 8.9   PHOS 3.8  --  4.4  --  3.8    < > = values in this interval not displayed.       Recent Labs   Lab 11/15/23  1720 11/18/23  1236 11/19/23  0336 11/19/23  1642 11/20/23  0325   ALKPHOS  --    < > 74 83 77   BILITOT  --    < > 0.6 0.4 0.5   BILIDIR 0.4*  --   --   --   --    PROT  --    < > 6.9 7.2 6.5   ALT  --    < > 19 17 21   AST  --    < > 13 12 20    < > = values in this interval not displayed.       Recent Labs   Lab 11/15/23  1720   CHOL 107*   TRIG 74   HDL 49     Significant Imaging:     RHC 11/20/23    The estimated blood loss was none.    The filling pressures on the right and left were severely elevated. Pulmonary hypertension was moderate to severe.    RA  20  PA 60/29 (39)  PCWP 29    CO  4.6 l/min  CI  2.3 l/min/m2    PVR 2.2 DARBY.    Condition: on DObutamine 5 mcg/kg/min.    RHC 11/13/23  Study performed on  5 mcg/kg/min:     Right atrial pressure is moderately elevated.     Pulmonary artery pressure is moderately elevated.     Pulmonary wedge pressure is moderately severely elevated.     Pulmonary vascular resistance is mildly elevated 3.7 Wood units.     Systemic vascular resistance is 1327.     Reduced cardiac power output 0.38.     Normal CHINYERE 2.50.     RA: 19/ 16/ 14 RV: 65/ 20 PA: 70/ 35/ 47 PWP: 32/ 45/ 32 .   Cardiac output was 4.1 by Ana. Cardiac index is 2.1 L/min/m2.   O2 Sat: PA 32%.   Pulmonary vascular resistance: 293. Systemic vascular resistance: 1327.      TTE 10/19/23     Left Ventricle: The left ventricle is severely dilated. Moderately increased ventricular mass. Normal wall thickness. There is moderate eccentric hypertrophy. regional wall motion abnormalities present. There is severely reduced systolic function with a visually estimated ejection  fraction of 15 - 20%. Biplane (2D) method of discs ejection fraction is 18%. There is diastolic dysfunction.    Left Atrium: Left atrium is severely dilated.    Right Ventricle: Normal right ventricular cavity size. Systolic function is normal.    Aortic Valve: There is mild aortic valve sclerosis.    Mitral Valve: There is mild to moderate regurgitation.    IVC/SVC: Intermediate venous pressure at 8 mmHg.           [place]

## 2023-11-20 NOTE — ASSESSMENT & PLAN NOTE
Pt with known ICM with an EF of 25% on home  presenting with decompensated HF.  Not in cardiogenic shock    [facility] 11/14: RA 14, PA 70/35, PCWP 32, CO 4.1, CI 2.1.     - Home  gtt at 5  - home GDMT: entresto 24/26mg BID (on hold 2/2 IVÁN), Hydralazine 25 q8h being continued  - home diuretics: Was taking lasix 40 bid  - CTS to follow regarding potential need for upgraded support to temporary MCS as bridge to advanced therapies.l   - Continue VAD/OHTx workup. (Leaning towards LVAD) On step 4  -Blood type A+  -PT/OT and nutrition   -Continue current support with  5, Ketty 10ppm, Lasix 20mg/hr  -will obtain leave in [place] today

## 2023-11-20 NOTE — SUBJECTIVE & OBJECTIVE
Interval HPI:   Overnight events: No acute events overnight. Patient in room [id number] A. Blood glucose stable. BG above goal on current insulin regimen (SSI and prandial insulin). Steroid use- None. Day of Surgery  Renal function- Abnormal - Creatinine 2.8   Vasopressors-  None       Endocrine will continue to follow and manage insulin orders inpatient.       Diet NPO Except for: Medication  Diet diabetic 2800 Calorie; Fluid - 2000mL     Eatin%  Nausea: No  Hypoglycemia and intervention: No  Fever: No  TPN and/or TF: No    BP (!) 102/58 (BP Location: Left arm, Patient Position: Lying)   Pulse 79   Temp 97.9 °F (36.6 °C) (Oral)   Resp 17   Ht 6' (1.829 m)   Wt 75.5 kg (166 lb 7.2 oz)   SpO2 100%   BMI 22.57 kg/m²     Labs Reviewed and Include    Recent Labs   Lab 23  0325   *   CALCIUM 8.9   ALBUMIN 2.9*   PROT 6.5   *   K 4.2   CO2 24   CL 94*   BUN 88*   CREATININE 2.8*   ALKPHOS 77   ALT 21   AST 20   BILITOT 0.5     Lab Results   Component Value Date    WBC 5.57 2023    HGB 6.4 (L) 2023    HCT 21 (L) 2023    MCV 90 2023     2023     Recent Labs   Lab 11/15/23  1720   TSH 1.381     Lab Results   Component Value Date    HGBA1C 7.6 (H) 10/12/2023       Nutritional status:   Body mass index is 22.57 kg/m².  Lab Results   Component Value Date    ALBUMIN 2.9 (L) 2023    ALBUMIN 3.0 (L) 2023    ALBUMIN 3.0 (L) 2023     Lab Results   Component Value Date    PREALBUMIN 27 11/15/2023       Estimated Creatinine Clearance: 29.6 mL/min (A) (based on SCr of 2.8 mg/dL (H)).    Accu-Checks  Recent Labs     23  1712 23  2243 23  0743 23  1208 23  1211 23  1645 23  2109 23  0854 23  1150 23  1639   POCTGLUCOSE 190* 227* 200* 357* 343* 280* 258* 242* 365* 321*       Current Medications and/or Treatments Impacting Glycemic Control  Immunotherapy:    Immunosuppressants       None           Steroids:   Hormones (From admission, onward)      None          Pressors:    Autonomic Drugs (From admission, onward)      None          Hyperglycemia/Diabetes Medications:   Antihyperglycemics (From admission, onward)      Start     Stop Route Frequency Ordered    11/20/23 2100  insulin detemir U-100 (Levemir) pen 18 Units         -- SubQ Nightly 11/20/23 1648    11/20/23 1747  insulin aspart U-100 pen 0-10 Units         -- SubQ Before meals & nightly PRN 11/20/23 1648    11/19/23 1130  insulin aspart U-100 pen 12 Units         -- SubQ 3 times daily with meals 11/19/23 0818

## 2023-11-20 NOTE — ASSESSMENT & PLAN NOTE
IVÁN on CKD2. Baseline Cr of 1.7-2.0.   -Currently 2.8, trending down  - Trend along with CVP, renal indices stable with diuresis  - Hold ARNi, entresto  -Trend BMPs daily

## 2023-11-20 NOTE — H&P
[facility] Cath Lab  Interventional Cardiology  H&P    Patient Name: Radha Abbott  MRN: [id number]  Admission Date: 11/9/2023  Code Status: Full Code   Attending Provider: Sandhya Price MD   Primary Care Physician: Vasu Kong MD  Principal Problem:Acute on chronic combined systolic and diastolic CHF, NYHA class 4    Patient information was obtained from ER records.     Subjective:     Chief Complaint:  dyspnea     HPI: patient with end stage HF on dobutamine 5 mcg/kg/min, here for RHC and leave in [place].    Past Medical History:   Diagnosis Date    CAD (coronary artery disease)     Diabetes mellitus     HFrEF (heart failure with reduced ejection fraction)     ICD (implantable cardioverter-defibrillator) in place     MI, old        Past Surgical History:   Procedure Laterality Date    CARDIAC SURGERY      RIGHT HEART CATHETERIZATION Right 10/10/2023    Procedure: INSERTION, CATHETER, RIGHT HEART;  Surgeon: Bin Gandhi MD;  Location: [facility] CATH LAB;  Service: Cardiology;  Laterality: Right;    RIGHT HEART CATHETERIZATION Right 10/13/2023    Procedure: INSERTION, CATHETER, RIGHT HEART;  Surgeon: Walter Mcintyre MD;  Location: [facility] CATH LAB;  Service: Cardiology;  Laterality: Right;    RIGHT HEART CATHETERIZATION  11/13/2023    RIGHT HEART CATHETERIZATION Right 11/13/2023    Procedure: INSERTION, CATHETER, RIGHT HEART;  Surgeon: Juventino Bermudez Jr., MD;  Location: [facility] CATH LAB;  Service: Cardiology;  Laterality: Right;       Review of patient's allergies indicates:  No Known Allergies    PTA Medications   Medication Sig    amiodarone (PACERONE) 400 MG tablet Take 1 tablet (400 mg total) by mouth once daily.    apixaban (ELIQUIS) 5 mg Tab Take 1 tablet (5 mg total) by mouth 2 (two) times daily.    aspirin (ECOTRIN) 81 MG EC tablet Take 81 mg by mouth once daily.    atorvastatin (LIPITOR) 40 MG tablet Take 40 mg by mouth.    DOBUTamine (DOBUTREX) 1,000 mg/250 mL (4,000 mcg/mL) infusion Inject 389.5  mcg/min into the vein continuous.    fish oil-omega-3 fatty acids 300-1,000 mg capsule Take 2 g by mouth once daily.    furosemide (LASIX) 40 MG tablet Take 1 tablet (40 mg total) by mouth 2 (two) times daily.    gabapentin (NEURONTIN) 300 MG capsule Take 300 mg by mouth nightly as needed.    multivitamin capsule Take 1 capsule by mouth once daily.    pantoprazole (PROTONIX) 40 MG tablet Take 1 tablet (40 mg total) by mouth before breakfast.    potassium chloride SA (K-DUR,KLOR-CON) 20 MEQ tablet Take 1 tablet (20 mEq total) by mouth once daily.    sacubitriL-valsartan (ENTRESTO) 24-26 mg per tablet Take 1 tablet by mouth 2 (two) times daily.    metOLazone (ZAROXOLYN) 2.5 MG tablet Take 1 tablet (2.5 mg total) by mouth once daily. Thursday 11/9 and Saturday 11/10    torsemide (DEMADEX) 20 MG Tab Take 2 tablets (40 mg total) by mouth 2 (two) times a day.     Family History    None       Tobacco Use    Smoking status: Every Day     Current packs/day: 0.50     Types: Cigarettes    Smokeless tobacco: Not on file   Substance and Sexual Activity    Alcohol use: Yes     Comment: rarely    Drug use: No    Sexual activity: Not on file     ROS  Objective:     Vital Signs (Most Recent):  Temp: 98.2 °F (36.8 °C) (11/20/23 0701)  Pulse: 78 (11/20/23 1201)  Resp: 20 (11/20/23 1201)  BP: (!) 94/55 (11/20/23 1201)  SpO2: 100 % (11/20/23 1230) Vital Signs (24h Range):  Temp:  [97.6 °F (36.4 °C)-98.3 °F (36.8 °C)] 98.2 °F (36.8 °C)  Pulse:  [78-95] 78  Resp:  [10-27] 20  SpO2:  [96 %-100 %] 100 %  BP: ()/(55-79) 94/55     Weight: 75.5 kg (166 lb 7.2 oz)  Body mass index is 22.57 kg/m².    SpO2: 100 %         Intake/Output Summary (Last 24 hours) at 11/20/2023 1353  Last data filed at 11/20/2023 1201  Gross per 24 hour   Intake 1302.97 ml   Output 700 ml   Net 602.97 ml       Lines/Drains/Airways       Central Venous Catheter Line  Duration             Percutaneous Central Line Insertion/Assessment - Triple Lumen  11/14/23 2440  5 days     Introducer Single Lumen 11/20/23 1330 Internal Jugular Left <1 day              Peripheral Intravenous Line  Duration                  Midline Catheter Insertion/Assessment  - Single Lumen 11/14/23 1113 Left brachial vein 20g x 10cm 6 days                    Physical Exam    Significant Labs: CBC   Recent Labs   Lab 11/19/23  0336 11/19/23  1642 11/20/23  0325 11/20/23  0855   WBC 6.23 6.12 5.57  --    HGB 7.0* 7.1* 6.4*  --    HCT 21.4* 21.8* 20.7* 21*    311 267  --        Significant Imaging: Echocardiogram: 2D echo with color flow doppler: Echo results to be reported separately.   Assessment and Plan:     Active Diagnoses:    Diagnosis Date Noted POA    PRINCIPAL PROBLEM:  Acute on chronic combined systolic and diastolic CHF, NYHA class 4 [I50.43] 10/17/2023 Yes    Pre-transplant evaluation for heart transplant [Z01.818] 11/17/2023 Not Applicable    Palliative care encounter [Z51.5] 11/17/2023 Not Applicable    Advance care planning [Z71.89] 11/17/2023 Not Applicable    PAF (paroxysmal atrial fibrillation) [I48.0] 10/11/2023 Yes    IVÁN (acute kidney injury) [N17.9] 10/08/2023 Yes    Ventricular tachycardia [I47.20] 10/07/2023 Yes    Type 2 diabetes mellitus without complication, without long-term current use of insulin [E11.9] 10/07/2023 Yes    CAD (coronary artery disease) [I25.10] 10/07/2023 Yes      Problems Resolved During this Admission:    Diagnosis Date Noted Date Resolved POA    Atrial fibrillation with tachycardic ventricular rate [I48.91] 10/15/2023 11/12/2023 Yes       Patient agrees. Left IJ access.    VTE Risk Mitigation (From admission, onward)           Ordered     heparin 25,000 units in dextrose 5% (100 units/ml) IV bolus from bag - ADDITIONAL PRN BOLUS - 60 units/kg  As needed (PRN)        Question:  Heparin Infusion Adjustment (DO NOT MODIFY ANSWER)  Answer:  [email or web address]\Images\Pharmacy\HeparinInfusions\heparin LOW INTENSITY nomogram for [facility] [id number]    11/11/23 0741      heparin 25,000 units in dextrose 5% (100 units/ml) IV bolus from bag - ADDITIONAL PRN BOLUS - 30 units/kg  As needed (PRN)        Question:  Heparin Infusion Adjustment (DO NOT MODIFY ANSWER)  Answer:  [email or web address]\epic\Images\Pharmacy\HeparinInfusions\heparin LOW INTENSITY nomogram for [facility] [id number]    11/11/23 0741     heparin 25,000 units in dextrose 5% 250 mL (100 units/mL) infusion LOW INTENSITY nomogram - [facility]  Continuous        Question:  Begin at (units/kg/hr)  Answer:  12    11/11/23 0741                    Sajan Casas MD  Interventional Cardiology   [facility]

## 2023-11-20 NOTE — CONSULTS
[facility] Lab  Interventional Cardiology  Consult Note    Patient Name: Radha Abbott  MRN: [id number]  Admission Date: 11/9/2023  Hospital Length of Stay: 11 days  Code Status: Full Code   Attending Provider: Sandhya Price MD  Consulting Provider: Clif Olmedo MD  Primary Care Physician: Vasu Kong MD  Principal Problem:Acute on chronic combined systolic and diastolic CHF, NYHA class 4    Patient information was obtained from patient and ER records.     Inpatient consult to Interventional Cardiology  Consult performed by: Clif Garrido MD  Consult ordered by: Hugh Duran NP        Subjective:     Chief Complaint:  Heart failure     HPI:  64yo M patient with history of CAD s/p 3v CABG (unclear anatomy, 2009), HFrEF ICM (EF 15-20%) s/p ICD (medtronic 2009), T2DM (Hba1c 7.7), HTN, HLD, Vfib (Amio), CKD (Cr 2.1), who is being evaluated for acute heart failure requiring IABP.    Of note, he was recently evauated on 11/16/23 for the same reason, but he had mild improvement at that time and IABP was held. On step 4 of pathway for heart transplant, and per note contamplating LVAD. Patient had a RHC today which showed the following: RA 20, PA 60/29 (39), PCWP 29, CO 4.6, CI 2.3, PVR 2.2, on  5mcg/kg/min.     Currently continue on DAB 5, furosemide 40mg/h, heparin gtt, nitric oxide 10ppm, Amiodarone 400mg qd, ASA 81mg qd, Atoravstatin 40mg qd, and intermittent doses of chlorothiazide.     Interventional cardiology was consulted for IABP placement.     Past Medical History:   Diagnosis Date    CAD (coronary artery disease)     Diabetes mellitus     HFrEF (heart failure with reduced ejection fraction)     ICD (implantable cardioverter-defibrillator) in place     MI, old        Past Surgical History:   Procedure Laterality Date    CARDIAC SURGERY      RIGHT HEART CATHETERIZATION Right 10/10/2023    Procedure: INSERTION, CATHETER, RIGHT HEART;  Surgeon: Bin Gandhi MD;   Location: [facility] CATH LAB;  Service: Cardiology;  Laterality: Right;    RIGHT HEART CATHETERIZATION Right 10/13/2023    Procedure: INSERTION, CATHETER, RIGHT HEART;  Surgeon: Walter Mcintyre MD;  Location: [facility] CATH LAB;  Service: Cardiology;  Laterality: Right;    RIGHT HEART CATHETERIZATION  11/13/2023    RIGHT HEART CATHETERIZATION Right 11/13/2023    Procedure: INSERTION, CATHETER, RIGHT HEART;  Surgeon: Juventino Bermudez Jr., MD;  Location: [facility] CATH LAB;  Service: Cardiology;  Laterality: Right;       Review of patient's allergies indicates:  No Known Allergies    PTA Medications   Medication Sig    amiodarone (PACERONE) 400 MG tablet Take 1 tablet (400 mg total) by mouth once daily.    apixaban (ELIQUIS) 5 mg Tab Take 1 tablet (5 mg total) by mouth 2 (two) times daily.    aspirin (ECOTRIN) 81 MG EC tablet Take 81 mg by mouth once daily.    atorvastatin (LIPITOR) 40 MG tablet Take 40 mg by mouth.    DOBUTamine (DOBUTREX) 1,000 mg/250 mL (4,000 mcg/mL) infusion Inject 389.5 mcg/min into the vein continuous.    fish oil-omega-3 fatty acids 300-1,000 mg capsule Take 2 g by mouth once daily.    furosemide (LASIX) 40 MG tablet Take 1 tablet (40 mg total) by mouth 2 (two) times daily.    gabapentin (NEURONTIN) 300 MG capsule Take 300 mg by mouth nightly as needed.    multivitamin capsule Take 1 capsule by mouth once daily.    pantoprazole (PROTONIX) 40 MG tablet Take 1 tablet (40 mg total) by mouth before breakfast.    potassium chloride SA (K-DUR,KLOR-CON) 20 MEQ tablet Take 1 tablet (20 mEq total) by mouth once daily.    sacubitriL-valsartan (ENTRESTO) 24-26 mg per tablet Take 1 tablet by mouth 2 (two) times daily.    metOLazone (ZAROXOLYN) 2.5 MG tablet Take 1 tablet (2.5 mg total) by mouth once daily. Thursday 11/9 and Saturday 11/10    torsemide (DEMADEX) 20 MG Tab Take 2 tablets (40 mg total) by mouth 2 (two) times a day.     Family History    None       Tobacco Use    Smoking status: Every Day     Current  packs/day: 0.50     Types: Cigarettes    Smokeless tobacco: Not on file   Substance and Sexual Activity    Alcohol use: Yes     Comment: rarely    Drug use: No    Sexual activity: Not on file     Review of Systems   Constitutional: Negative.   HENT: Negative.     Cardiovascular:  Negative for chest pain, claudication, cyanosis, dyspnea on exertion, irregular heartbeat, leg swelling, near-syncope, orthopnea, palpitations and paroxysmal nocturnal dyspnea.   Respiratory: Negative.     Endocrine: Negative.    Musculoskeletal: Negative.    Gastrointestinal: Negative.    Genitourinary: Negative.    Neurological: Negative.      Objective:     Vital Signs (Most Recent):  Temp: 98.2 °F (36.8 °C) (11/20/23 0701)  Pulse: 78 (11/20/23 1201)  Resp: 20 (11/20/23 1201)  BP: (!) 94/55 (11/20/23 1201)  SpO2: 100 % (11/20/23 1230) Vital Signs (24h Range):  Temp:  [97.6 °F (36.4 °C)-98.3 °F (36.8 °C)] 98.2 °F (36.8 °C)  Pulse:  [78-95] 78  Resp:  [10-27] 20  SpO2:  [96 %-100 %] 100 %  BP: ()/(55-79) 94/55     Weight: 75.5 kg (166 lb 7.2 oz)  Body mass index is 22.57 kg/m².    SpO2: 100 %         Intake/Output Summary (Last 24 hours) at 11/20/2023 1522  Last data filed at 11/20/2023 1201  Gross per 24 hour   Intake 1142.07 ml   Output 500 ml   Net 642.07 ml       Lines/Drains/Airways       Central Venous Catheter Line  Duration              Introducer Single Lumen 11/20/23 1330 Internal Jugular Left <1 day     Introducer Single Lumen 11/20/23 1430 Internal Jugular Left <1 day              Peripheral Intravenous Line  Duration                  Midline Catheter Insertion/Assessment  - Single Lumen 11/14/23 1113 Left brachial vein 20g x 10cm 6 days                     Physical Exam  Vitals and nursing note reviewed.   Constitutional:       General: He is not in acute distress.     Appearance: Normal appearance. He is normal weight. He is not ill-appearing or diaphoretic.   HENT:      Head: Normocephalic and atraumatic.   Eyes:       Pupils: Pupils are equal, round, and reactive to light.   Cardiovascular:      Rate and Rhythm: Normal rate and regular rhythm.      Pulses: Normal pulses.      Heart sounds: Normal heart sounds.   Pulmonary:      Effort: Pulmonary effort is normal.      Breath sounds: Normal breath sounds.   Abdominal:      General: Abdomen is flat. Bowel sounds are normal. There is no distension.      Palpations: Abdomen is soft. There is no mass.      Tenderness: There is no abdominal tenderness.   Musculoskeletal:         General: Normal range of motion.   Skin:     General: Skin is warm.      Capillary Refill: Capillary refill takes less than 2 seconds.   Neurological:      General: No focal deficit present.      Mental Status: He is alert and oriented to person, place, and time.            Significant Labs:     Recent Labs   Lab 11/19/23  0336 11/19/23 1642 11/20/23  0325 11/20/23  0855   WBC 6.23 6.12 5.57  --    HGB 7.0* 7.1* 6.4*  --    HCT 21.4* 21.8* 20.7* 21*    311 267  --        Recent Labs   Lab 11/18/23  0349 11/18/23  1236 11/19/23  0336 11/19/23 1642 11/20/23  0325   *   < > 136 131* 132*   K 4.1   < > 3.8 4.2 4.2   CL 97   < > 97 94* 94*   CO2 24   < > 26 23 24   BUN 74*   < > 87* 85* 88*   CREATININE 3.0*   < > 3.1* 2.9* 2.8*   CALCIUM 8.8   < > 9.0 8.9 8.9   PHOS 3.8  --  4.4  --  3.8    < > = values in this interval not displayed.       Recent Labs   Lab 11/15/23  1720 11/18/23  1236 11/19/23  0336 11/19/23  1642 11/20/23  0325   ALKPHOS  --    < > 74 83 77   BILITOT  --    < > 0.6 0.4 0.5   BILIDIR 0.4*  --   --   --   --    PROT  --    < > 6.9 7.2 6.5   ALT  --    < > 19 17 21   AST  --    < > 13 12 20    < > = values in this interval not displayed.       Recent Labs   Lab 11/15/23  1720   CHOL 107*   TRIG 74   HDL 49     Significant Imaging:     RHC 11/20/23    The estimated blood loss was none.    The filling pressures on the right and left were severely elevated. Pulmonary hypertension was  moderate to severe.    RA  20  PA 60/29 (39)  PCWP 29    CO  4.6 l/min  CI  2.3 l/min/m2    PVR 2.2 DARBY.    Condition: on DObutamine 5 mcg/kg/min.    RHC 11/13/23  Study performed on  5 mcg/kg/min:     Right atrial pressure is moderately elevated.     Pulmonary artery pressure is moderately elevated.     Pulmonary wedge pressure is moderately severely elevated.     Pulmonary vascular resistance is mildly elevated 3.7 Wood units.     Systemic vascular resistance is 1327.     Reduced cardiac power output 0.38.     Normal CHINYERE 2.50.     RA: 19/ 16/ 14 RV: 65/ 20 PA: 70/ 35/ 47 PWP: 32/ 45/ 32 .   Cardiac output was 4.1 by Ana. Cardiac index is 2.1 L/min/m2.   O2 Sat: PA 32%.   Pulmonary vascular resistance: 293. Systemic vascular resistance: 1327.      TTE 10/19/23     Left Ventricle: The left ventricle is severely dilated. Moderately increased ventricular mass. Normal wall thickness. There is moderate eccentric hypertrophy. regional wall motion abnormalities present. There is severely reduced systolic function with a visually estimated ejection fraction of 15 - 20%. Biplane (2D) method of discs ejection fraction is 18%. There is diastolic dysfunction.    Left Atrium: Left atrium is severely dilated.    Right Ventricle: Normal right ventricular cavity size. Systolic function is normal.    Aortic Valve: There is mild aortic valve sclerosis.    Mitral Valve: There is mild to moderate regurgitation.    IVC/SVC: Intermediate venous pressure at 8 mmHg.          Assessment and Plan:     Cardiac/Vascular  * Acute on chronic combined systolic and diastolic CHF, NYHA class 4  - 64yo M patient with history of CAD s/p 3v CABG (unclear anatomy, 2009), HFrEF ICM (EF 15-20%) s/p ICD (medtronic 2009), T2DM (Hba1c 7.7), HTN, HLD, Vfib (Amio), CKD (Cr 2.1), admitted to [facility] on 11/09/23  - Patient worsening and RHC done today showed elevated filling pressures, not responding to diuretic therapy, and decreased CO/CI  - Interventional  cardiology consulted for IABP placement    --IABP placement  - Access: right common femoral artery  - Creatinine/CrCl: 2.8  - Allergies: No shellfish / Iodine allergy  - Pre-Hydration: NS  - Pre-Op Med: Bendaryl 50mg pO   - All patient's questions were answered.    -The risks of IABP include but are not limited to: bleeding, infection, allergic reactions, kidney injury and potential need for dialysis, stroke, limb ischemia and death.   -The risks of moderate sedation include hypotension, respiratory depression, arrhythmias, bronchospasm, and death.   - Informed consent was obtained and the  patient is agreeable to proceed with the procedure.    Ventricular tachycardia  - History of VT  - On amiodarone and s/p ICD  - Continue management per primary team    Renal/  IVÁN (acute kidney injury)  - Creatinine 2.8 today, prior 2.9 and 3.1      Endocrine  Type 2 diabetes mellitus without complication, without long-term current use of insulin  - Continue management by primary team        VTE Risk Mitigation (From admission, onward)           Ordered     heparin 25,000 units in dextrose 5% (100 units/ml) IV bolus from bag - ADDITIONAL PRN BOLUS - 60 units/kg  As needed (PRN)        Question:  Heparin Infusion Adjustment (DO NOT MODIFY ANSWER)  Answer:  \\[email or web address]\epic\Images\Pharmacy\HeparinInfusions\heparin LOW INTENSITY nomogram for [facility] [id number]    11/11/23 0741     heparin 25,000 units in dextrose 5% (100 units/ml) IV bolus from bag - ADDITIONAL PRN BOLUS - 30 units/kg  As needed (PRN)        Question:  Heparin Infusion Adjustment (DO NOT MODIFY ANSWER)  Answer:  \[facility] [email or web address]\epic\Images\Pharmacy\HeparinInfusions\heparin LOW INTENSITY nomogram for [facility] [id number].pdf    11/11/23 0741     heparin 25,000 units in dextrose 5% 250 mL (100 units/mL) infusion LOW INTENSITY nomogram - [facility]  Continuous        Question:  Begin at (units/kg/hr)  Answer:  12    11/11/23 0741                    Thank you for your consult.     Clif  Citlali Olmedo MD  Interventional Cardiology   [facility] - Cath Lab

## 2023-11-20 NOTE — PLAN OF CARE
Recommendations  1. Continue Diabetic/Cardiac diet-Fluid per MD  2. Continue Boost supplements for optimization of protein and calorie intake   2. RD following     Goals: Meet % of EEN/EPN by RD f/u date  Nutrition Goal Status: progressing towards goal  Communication of RD Recs: POC

## 2023-11-20 NOTE — PT/OT/SLP PROGRESS
Occupational Therapy Not Seen Note      Patient Name:  Radha Abbott   MRN:  [id number]    Patient not seen today secondary to patient CIPRIANO for LIJ swan placement at first attempt at 1339. Patient frustrated during second attempt, declining therapy services at 1326 attempt. Patient educated on continuing UE HEP but frustration with swan placement. Patient has met POC. Will follow up as appropriate.     11/20/2023

## 2023-11-20 NOTE — PROGRESS NOTES
[facility] - Cardiac Intensive Care  Heart Transplant  Progress Note    Patient Name: Radha Abbott  MRN: [id number]  Admission Date: 11/9/2023  Hospital Length of Stay: 11 days  Attending Physician: Sandhya Price MD  Primary Care Provider: Vasu Kong MD  Principal Problem:Acute on chronic combined systolic and diastolic CHF, NYHA class 4    Subjective:   Interval History: No acute events overnight. Kidney function undulating. JVP visible on exam. Will give 1x dose of Diuril. Cath lab for leave in [place] today. Holding off on IABP. Will obtain repeat echo. On step 4 of pathway, working to present this week. Patient leaning towards LVAD.     Continuous Infusions:   sodium chloride 0.9%      DOBUTamine 5 mcg/kg/min (11/20/23 1101)    furosemide (LASIX) 500 mg in 50 mL infusion (conc: 10 mg/mL) 20 mg/hr (11/20/23 1101)    heparin (porcine) in D5W 16 Units/kg/hr (11/20/23 1101)    nitric oxide gas       Scheduled Meds:   amiodarone  400 mg Oral Daily    aspirin  81 mg Oral Daily    atorvastatin  40 mg Oral Daily    chlorothiazide (DIURIL) 250 mg in dextrose 5 % (D5W) 50 mL IVPB  250 mg Intravenous Once    hydrALAZINE  25 mg Oral Q8H    insulin aspart U-100  12 Units Subcutaneous TIDWM    magnesium oxide  400 mg Oral Daily    multivitamin  1 tablet Oral Daily    pantoprazole  40 mg Oral Before breakfast     PRN Meds:sodium chloride 0.9%, acetaminophen, dextrose 10%, dextrose 10%, gabapentin, glucagon (human recombinant), glucose, glucose, heparin (PORCINE), heparin (PORCINE), insulin aspart U-100, senna-docusate 8.6-50 mg    Review of patient's allergies indicates:  No Known Allergies  Objective:     Vital Signs (Most Recent):  Temp: 98.2 °F (36.8 °C) (11/20/23 0701)  Pulse: 88 (11/20/23 1101)  Resp: 18 (11/20/23 1101)  BP: 102/69 (11/20/23 1101)  SpO2: 100 % (11/20/23 1101) Vital Signs (24h Range):  Temp:  [97.6 °F (36.4 °C)-98.3 °F (36.8 °C)] 98.2 °F (36.8 °C)  Pulse:  [79-96] 88  Resp:  [10-27] 18  SpO2:  [96  %-100 %] 100 %  BP: ()/(53-79) 102/69     Patient Vitals for the past 72 hrs (Last 3 readings):   Weight   11/20/23 0400 75.5 kg (166 lb 7.2 oz)   11/19/23 0400 74.8 kg (164 lb 14.5 oz)   11/18/23 0400 76.5 kg (168 lb 10.4 oz)     Body mass index is 22.57 kg/m².      Intake/Output Summary (Last 24 hours) at 11/20/2023 1135  Last data filed at 11/20/2023 1101  Gross per 24 hour   Intake 1566.72 ml   Output 800 ml   Net 766.72 ml       Hemodynamic Parameters:       Telemetry: NSR        Physical Exam  HENT:      Head: Normocephalic and atraumatic.   Eyes:      Pupils: Pupils are equal, round, and reactive to light.   Neck:      Comments: Jvp mid neck   Cardiovascular:      Rate and Rhythm: Normal rate and regular rhythm.   Pulmonary:      Effort: Pulmonary effort is normal.      Breath sounds: Normal breath sounds.   Abdominal:      General: Bowel sounds are normal.      Palpations: Abdomen is soft.   Musculoskeletal:      Cervical back: Normal range of motion and neck supple.      Right lower leg: No edema.      Left lower leg: No edema.   Skin:     General: Skin is warm and dry.   Neurological:      General: No focal deficit present.      Mental Status: He is oriented to person, place, and time.            Significant Labs:  CBC:  Recent Labs   Lab 11/19/23  0336 11/19/23  1642 11/20/23  0325 11/20/23  0855   WBC 6.23 6.12 5.57  --    RBC 2.43* 2.43* 2.30*  --    HGB 7.0* 7.1* 6.4*  --    HCT 21.4* 21.8* 20.7* 21*    311 267  --    MCV 88 90 90  --    MCH 28.8 29.2 27.8  --    MCHC 32.7 32.6 30.9*  --      BNP:  Recent Labs   Lab 11/18/23  1236   *     CMP:  Recent Labs   Lab 11/19/23  0336 11/19/23  1642 11/20/23  0325   * 268* 226*   CALCIUM 9.0 8.9 8.9   ALBUMIN 3.0* 3.0* 2.9*   PROT 6.9 7.2 6.5    131* 132*   K 3.8 4.2 4.2   CO2 26 23 24   CL 97 94* 94*   BUN 87* 85* 88*   CREATININE 3.1* 2.9* 2.8*   ALKPHOS 74 83 77   ALT 19 17 21   AST 13 12 20   BILITOT 0.6 0.4 0.5     "  Coagulation:   Recent Labs   Lab 11/15/23  1601 11/16/23  0436 11/18/23  0349 11/19/23  0336 11/20/23  0325   INR 1.1  --   --   --   --    APTT  --    < > 56.1* 56.7* 54.1*    < > = values in this interval not displayed.     LDH:  No results for input(s): "LDH" in the last 72 hours.  Microbiology:  Microbiology Results (last 7 days)       ** No results found for the last 168 hours. **            BMP:   Recent Labs   Lab 11/20/23  0325   *   *   K 4.2   CL 94*   CO2 24   BUN 88*   CREATININE 2.8*   CALCIUM 8.9   MG 2.4     I have reviewed all pertinent labs within the past 24 hours.    Estimated Creatinine Clearance: 29.6 mL/min (A) (based on SCr of 2.8 mg/dL (H)).    Diagnostic Results:  I have reviewed all pertinent imaging results/findings within the past 24 hours.  Assessment and Plan:     63 year old male with hx of CAD s/p 3v CABG (unclear anatomy, 2009), ICM with a recent EF of 15-20% s/p ICD (medtronik 2009), DM2 (a1c 7.7), HTN, HLD, Vfib on amio who presents to the ED with CC of SOB     Pt was recently admitted to [facility] [place].  He was started on a Lasix gtt and did well.  Started on gDMT and discharged home on  5 with plans to follow up in HTS clinic for ongoing transplant evaluation at another facility.  He says that about a few days ago he started to notice LE swelling.  This turned into Nelson and orthopnea.  He says he can't walk to the bathroom without getting SOB.  He also complains of weight gain.  He takes torsemide 20mg BID at home and was told to trial additional Lasix which he did without any improvement.  He was rx metolazone but has not been filled.  He came to the ED     In the ED he was AF with stable VS on RA.  CBC showing chronic anemia.  CMP notable for acute on chronic CKD with baseline around 2.1 and Cr now 2.6.  BNP elevated.  Lactate neg.  CXR showing pulmonary edema.  I evaluated the pt at the bedside.  Bedside TTE showing CVP >15.  He was subsequently admitted to " the CCU for diuresis.     He was continued on his home  and was started on a lasix gtt, which he responded well to overnight (net -1700cc. He feels much better this morning as well. Our transplant coordinators have been working on insurance approval and he is now being transferred to [facility] service for transplant evaluation.     * Acute on chronic combined systolic and diastolic CHF, NYHA class 4  Pt with known ICM with an EF of 25% on home  presenting with decompensated HF.  Not in cardiogenic shock    [facility] 11/14: RA 14, PA 70/35, PCWP 32, CO 4.1, CI 2.1.     - Home  gtt at 5  - home GDMT: entresto 24/26mg BID (on hold 2/2 IVÁN), Hydralazine 25 q8h being continued  - home diuretics: Was taking lasix 40 bid  - CTS to follow regarding potential need for upgraded support to temporary MCS as bridge to advanced therapies.l   - Continue VAD/OHTx workup. (Leaning towards LVAD) On step 4  -Blood type A+  -PT/OT and nutrition   -Continue current support with  5, Ketty 10ppm, Lasix 20mg/hr  -will obtain leave in an today     PAF (paroxysmal atrial fibrillation)  Known hx of pAF. In sinus rhythm on admission, but 1 run of AF RVR overnight. He spontaneously converted.  - Continue home amiodarone 400mg qd  - Stop home Eliquis and continue heparin gtt while in the hospital.       IVÁN (acute kidney injury)  IVÁN on CKD2. Baseline Cr of 1.7-2.0.   -Currently 2.8, trending down  - Trend along with CVP, renal indices stable with diuresis  - Hold ARNi, entresto  -Trend BMPs daily     Type 2 diabetes mellitus without complication, without long-term current use of insulin  -A1c 7.6, not insulin dependent  - MDSSI  -diabetic diet ordered/Boost glucose control   - Endocrine following, appreciate assistance with blood glucose management    CAD (coronary artery disease)  -S/p 3vCABG in 2009  - continue home ASA, HI statin    Ventricular tachycardia  Hx VT s/p ICD placement (medtronic 2009)  - Continue home amio 400mg  qd    .Uninterrupted Critical Care/Counseling Time (not including procedures): 60 mins       Hugh Duran NP  Heart Transplant  [facility] - Cardiac Intensive Care

## 2023-11-20 NOTE — ASSESSMENT & PLAN NOTE
- 62yo M patient with history of CAD s/p 3v CABG (unclear anatomy, 2009), HFrEF ICM (EF 15-20%) s/p ICD (medtronic 2009), T2DM (Hba1c 7.7), HTN, HLD, Vfib (Amio), CKD (Cr 2.1), admitted to [facility] on 11/09/23  - Patient worsening and RHC done today showed elevated filling pressures, not responding to diuretic therapy, and decreased CO/CI  - Interventional cardiology consulted for IABP placement    --IABP placement  - Access: right common femoral artery  - Creatinine/CrCl: 2.8  - Allergies: No shellfish / Iodine allergy  - Pre-Hydration: NS  - Pre-Op Med: Bendaryl 50mg pO   - All patient's questions were answered.    -The risks of IABP include but are not limited to: bleeding, infection, allergic reactions, kidney injury and potential need for dialysis, stroke, limb ischemia and death.   -The risks of moderate sedation include hypotension, respiratory depression, arrhythmias, bronchospasm, and death.   - Informed consent was obtained and the  patient is agreeable to proceed with the procedure.

## 2023-11-20 NOTE — PROGRESS NOTES
[facility] - Cardiac Intensive Care  Endocrinology  Progress Note    Admit Date: 2023     Reason for Consult: Management of T2DM, Hyperglycemia     Surgical Procedure and Date: n/a    Diabetes diagnosis year:     Home Diabetes Medications:  Metformin (off since October)   Lab Results   Component Value Date    HGBA1C 7.6 (H) 10/12/2023       How often checking glucose at home?  Once daily in the AM    BG readings on regimen: 150-160s  Hypoglycemia on the regimen?  No  Missed doses on regimen?  n/a    Diabetes Complications include:     Hyperglycemia    Complicating diabetes co morbidities:   CAD s/p CABG, HTN, HLD      HPI:   Patient is a 63 y.o. male with a diagnosis of CAD s/p 3v CABG (unclear anatomy, ), ICM with a recent EF of 15-20% s/p ICD (medtronik ), DM2 (a1c 7.7), HTN, HLD, Vfib on amio who presents to the ED with CC of SOB. In the ED he was AF with stable VS on RA.  CBC showing chronic anemia.  CMP notable for acute on chronic CKD with baseline around 2.1 and Cr now 2.6.  BNP elevated.  Lactate neg.  CXR showing pulmonary edema. He was subsequently admitted to the CCU for diuresis.  Endocrinology consulted for management of T2DM.          Interval HPI:   Overnight events: No acute events overnight. Patient in room [id number] A. Blood glucose stable. BG above goal on current insulin regimen (SSI and prandial insulin). Steroid use- None. Day of Surgery  Renal function- Abnormal - Creatinine 2.8   Vasopressors-  None       Endocrine will continue to follow and manage insulin orders inpatient.       Diet NPO Except for: Medication  Diet diabetic 2800 Calorie; Fluid - 2000mL     Eatin%  Nausea: No  Hypoglycemia and intervention: No  Fever: No  TPN and/or TF: No    BP (!) 102/58 (BP Location: Left arm, Patient Position: Lying)   Pulse 79   Temp 97.9 °F (36.6 °C) (Oral)   Resp 17   Ht 6' (1.829 m)   Wt 75.5 kg (166 lb 7.2 oz)   SpO2 100%   BMI 22.57 kg/m²     Labs Reviewed and  Include    Recent Labs   Lab 11/20/23  0325   *   CALCIUM 8.9   ALBUMIN 2.9*   PROT 6.5   *   K 4.2   CO2 24   CL 94*   BUN 88*   CREATININE 2.8*   ALKPHOS 77   ALT 21   AST 20   BILITOT 0.5     Lab Results   Component Value Date    WBC 5.57 11/20/2023    HGB 6.4 (L) 11/20/2023    HCT 21 (L) 11/20/2023    MCV 90 11/20/2023     11/20/2023     Recent Labs   Lab 11/15/23  1720   TSH 1.381     Lab Results   Component Value Date    HGBA1C 7.6 (H) 10/12/2023       Nutritional status:   Body mass index is 22.57 kg/m².  Lab Results   Component Value Date    ALBUMIN 2.9 (L) 11/20/2023    ALBUMIN 3.0 (L) 11/19/2023    ALBUMIN 3.0 (L) 11/19/2023     Lab Results   Component Value Date    PREALBUMIN 27 11/15/2023       Estimated Creatinine Clearance: 29.6 mL/min (A) (based on SCr of 2.8 mg/dL (H)).    Accu-Checks  Recent Labs     11/18/23  1712 11/18/23  2243 11/19/23  0743 11/19/23  1208 11/19/23  1211 11/19/23  1645 11/19/23  2109 11/20/23  0854 11/20/23  1150 11/20/23  1639   POCTGLUCOSE 190* 227* 200* 357* 343* 280* 258* 242* 365* 321*       Current Medications and/or Treatments Impacting Glycemic Control  Immunotherapy:    Immunosuppressants       None          Steroids:   Hormones (From admission, onward)      None          Pressors:    Autonomic Drugs (From admission, onward)      None          Hyperglycemia/Diabetes Medications:   Antihyperglycemics (From admission, onward)      Start     Stop Route Frequency Ordered    11/20/23 2100  insulin detemir U-100 (Levemir) pen 18 Units         -- SubQ Nightly 11/20/23 1648    11/20/23 1747  insulin aspart U-100 pen 0-10 Units         -- SubQ Before meals & nightly PRN 11/20/23 1648    11/19/23 1130  insulin aspart U-100 pen 12 Units         -- SubQ 3 times daily with meals 11/19/23 0800            ASSESSMENT and PLAN    Cardiac/Vascular  * Acute on chronic combined systolic and diastolic CHF, NYHA class 4  Managed per primary team  Optimize BG  control      PAF (paroxysmal atrial fibrillation)  May increase insulin resistance.         Renal/  IVÁN (acute kidney injury)  Lab Results   Component Value Date    CREATININE 2.8 (H) 11/20/2023     Avoid insulin stacking  Titrate insulin slowly       Endocrine  Type 2 diabetes mellitus without complication, without long-term current use of insulin  Endocrinology consulted for BG management.   BG goal 140-180    WBD 0.6 units/kg/day (60/40 split)  - Levemir (Insulin Detemir) 18 units nightly (Started due to climbing FBG)  - Novolog (Insulin Aspart) 12 units TIDWM and prn for BG excursions [facility] (150/25) [place] [facility] [place] [facility] [place]  - BG checks AC/HS  - Hypoglycemia protocol in place    ** Please notify Endocrine for any change and/or advance in diet**  ** Please call Endocrine for any BG related issues **    Discharge Planning:   TBD. Please notify endocrinology prior to discharge.                 Erasmo Parrish, DNP, FNP  Endocrinology  [facility] - Cardiac Intensive Care

## 2023-11-20 NOTE — ASSESSMENT & PLAN NOTE
Lab Results   Component Value Date    CREATININE 2.8 (H) 11/20/2023     Avoid insulin stacking  Titrate insulin slowly

## 2023-11-20 NOTE — CARE UPDATE
Hemodynamics:   Parameter   at 2000   MAP 81   CVP 9   SvO2 63   CO  8.9   CI 4.6               DOBUTamine 5 mcg/kg/min (11/19/23 2002)    furosemide (LASIX) 500 mg in 50 mL infusion (conc: 10 mg/mL) 20 mg/hr (11/19/23 2002)         nitric oxide gas 10     Current Mechanical Circulatory Support:  None    UOP since 7PM:    Intake/Output Summary (Last 24 hours) at 11/19/2023 2032  Last data filed at 11/19/2023 2002  Gross per 24 hour   Intake 1581.26 ml   Output 1850 ml   Net -268.74 ml       Assessment/Plan:  -Recheck Cr at 3AM  - Plan was discussed with on-call attending    Jc Vivas MD  Cardiovascular Disease PGY-4  [facility]

## 2023-11-20 NOTE — PLAN OF CARE
CICU Care Plan    POC reviewed with Radha Abbott and family at 1902. Pt verbalized understanding. Questions and concerns addressed. No acute events noted this shift. Patient NPO since midnight for possible IABP placement.  HTS Fellow notified of CVP trend increasing overnight and UOP at that time. Notified of Hgb 6.4 as well, no new orders at this time, care continues as ordered per Primary team. Pt progressing toward goals. Security measures in place, plan of care to continue. See below and flowsheets for full assessment and VS info.     CVP: 9, 10, 11  SvO2: 63    Gtt:  Heparin @ 16 units/kg/min  Dobutamine @ 5 mcg/kg/min  Lasix @ 20mg/hr    Neuro:  Alcalde Coma Scale  Best Eye Response: 4-->(E4) spontaneous  Best Motor Response: 6-->(M6) obeys commands  Best Verbal Response: 5-->(V5) oriented  Alcalde Coma Scale Score: 15  Assessment Qualifiers: patient not sedated/intubated, no eye obstruction present  Pupil PERRLA: yes     24 hr Temp:  [97.6 °F (36.4 °C)-98.3 °F (36.8 °C)]     CV:   Rhythm: normal sinus rhythm  BP goals:   MAP > 65    Resp:      Oxygen Concentration (%): 36    Plan: 4L NC with 10 PPM of Nitric Oxide    GI/:     Diet/Nutrition Received: consistent carb/diabetic diet  Last Bowel Movement: 11/18/23  Voiding Characteristics: voids spontaneously without difficulty    Intake/Output Summary (Last 24 hours) at 11/20/2023 0651  Last data filed at 11/20/2023 0502  Gross per 24 hour   Intake 1811.64 ml   Output 875 ml   Net 936.64 ml     Unmeasured Output  Urine Occurrence: 2  Stool Occurrence: 1    Labs/Accuchecks:  Recent Labs   Lab 11/20/23  0325   WBC 5.57   RBC 2.30*   HGB 6.4*   HCT 20.7*         Recent Labs   Lab 11/20/23  0325   *   K 4.2   CO2 24   CL 94*   BUN 88*   CREATININE 2.8*   ALKPHOS 77   ALT 21   AST 20   BILITOT 0.5      Recent Labs   Lab 11/15/23  1601 11/16/23  0436 11/20/23  0325   INR 1.1  --   --    APTT  --    < > 54.1*    < > = values in this interval not  "displayed.    No results for input(s): "CPK", "CPKMB", "TROPONINI", "MB" in the last 168 hours.    Electrolytes: N/A - electrolytes WDL  Accuchecks: ACHS    Gtts:   sodium chloride 0.9%      DOBUTamine 5 mcg/kg/min (11/20/23 6320)    furosemide (LASIX) 500 mg in 50 mL infusion (conc: 10 mg/mL) 20 mg/hr (11/20/23 1651)    heparin (porcine) in D5W 16 Units/kg/hr (11/20/23 7982)    nitric oxide gas         LDA/Wounds:  Lines/Drains/Airways       Central Venous Catheter Line  Duration             Percutaneous Central Line Insertion/Assessment - Triple Lumen  11/14/23 1630 5 days              Peripheral Intravenous Line  Duration                  Midline Catheter Insertion/Assessment  - Single Lumen 11/14/23 1113 Left brachial vein 20g x 10cm 5 days                  Wounds: No  Wound care consulted: No     Problem: Adult Inpatient Plan of Care  Goal: Plan of Care Review  Outcome: Ongoing, Progressing  Goal: Patient-Specific Goal (Individualized)  Outcome: Ongoing, Progressing  Goal: Absence of Hospital-Acquired Illness or Injury  Outcome: Ongoing, Progressing  Goal: Optimal Comfort and Wellbeing  Outcome: Ongoing, Progressing  Goal: Readiness for Transition of Care  Outcome: Ongoing, Progressing     Problem: Diabetes Comorbidity  Goal: Blood Glucose Level Within Targeted Range  Outcome: Ongoing, Progressing     Problem: Fluid and Electrolyte Imbalance (Acute Kidney Injury/Impairment)  Goal: Fluid and Electrolyte Balance  Outcome: Ongoing, Progressing     Problem: Oral Intake Inadequate (Acute Kidney Injury/Impairment)  Goal: Optimal Nutrition Intake  Outcome: Ongoing, Progressing     Problem: Renal Function Impairment (Acute Kidney Injury/Impairment)  Goal: Effective Renal Function  Outcome: Ongoing, Progressing     Problem: Infection  Goal: Absence of Infection Signs and Symptoms  Outcome: Ongoing, Progressing     Problem: Adjustment to Illness (Heart Failure)  Goal: Optimal Coping  Outcome: Ongoing, Progressing   "   Problem: Cardiac Output Decreased (Heart Failure)  Goal: Optimal Cardiac Output  Outcome: Ongoing, Progressing     Problem: Dysrhythmia (Heart Failure)  Goal: Stable Heart Rate and Rhythm  Outcome: Ongoing, Progressing     Problem: Fluid Imbalance (Heart Failure)  Goal: Fluid Balance  Outcome: Ongoing, Progressing     Problem: Functional Ability Impaired (Heart Failure)  Goal: Optimal Functional Ability  Outcome: Ongoing, Progressing     Problem: Oral Intake Inadequate (Heart Failure)  Goal: Optimal Nutrition Intake  Outcome: Ongoing, Progressing     Problem: Respiratory Compromise (Heart Failure)  Goal: Effective Oxygenation and Ventilation  Outcome: Ongoing, Progressing     Problem: Sleep Disordered Breathing (Heart Failure)  Goal: Effective Breathing Pattern During Sleep  Outcome: Ongoing, Progressing     Problem: Cardiac Output Decreased  Goal: Effective Cardiac Output  Outcome: Ongoing, Progressing     Problem: Fall Injury Risk  Goal: Absence of Fall and Fall-Related Injury  Outcome: Ongoing, Progressing     Problem: Coping Ineffective  Goal: Effective Coping  Outcome: Ongoing, Progressing     Problem: Skin Injury Risk Increased  Goal: Skin Health and Integrity  Outcome: Ongoing, Progressing

## 2023-11-21 ENCOUNTER — COMMITTEE REVIEW (OUTPATIENT)
Dept: TRANSPLANT | Facility: CLINIC | Age: 63
End: 2023-11-21
Payer: MEDICAID

## 2023-11-21 DIAGNOSIS — I50.43 ACUTE ON CHRONIC COMBINED SYSTOLIC AND DIASTOLIC CHF, NYHA CLASS 4: ICD-10-CM

## 2023-11-21 DIAGNOSIS — I50.20 HFREF (HEART FAILURE WITH REDUCED EJECTION FRACTION): Primary | ICD-10-CM

## 2023-11-21 LAB
ALBUMIN SERPL BCP-MCNC: 3 G/DL (ref 3.5–5.2)
ALLENS TEST: ABNORMAL
ALP SERPL-CCNC: 82 U/L (ref 55–135)
ALT SERPL W/O P-5'-P-CCNC: 33 U/L (ref 10–44)
ANION GAP SERPL CALC-SCNC: 14 MMOL/L (ref 8–16)
ANION GAP SERPL CALC-SCNC: 15 MMOL/L (ref 8–16)
APTT PPP: 51 SEC (ref 21–32)
ASCENDING AORTA: 3 CM
AST SERPL-CCNC: 28 U/L (ref 10–40)
AV INDEX (PROSTH): 0.62
AV MEAN GRADIENT: 4 MMHG
AV PEAK GRADIENT: 8 MMHG
AV VALVE AREA BY VELOCITY RATIO: 2.52 CM²
AV VALVE AREA: 2.2 CM²
AV VELOCITY RATIO: 0.71
BASOPHILS # BLD AUTO: 0.06 K/UL (ref 0–0.2)
BASOPHILS NFR BLD: 0.9 % (ref 0–1.9)
BILIRUB SERPL-MCNC: 0.5 MG/DL (ref 0.1–1)
BIPAP: 0
BLD PROD TYP BPU: NORMAL
BLOOD UNIT EXPIRATION DATE: NORMAL
BLOOD UNIT TYPE CODE: 6200
BLOOD UNIT TYPE: NORMAL
BSA FOR ECHO PROCEDURE: 1.96 M2
BUN SERPL-MCNC: 89 MG/DL (ref 8–23)
BUN SERPL-MCNC: 94 MG/DL (ref 8–23)
CALCIUM SERPL-MCNC: 9.2 MG/DL (ref 8.7–10.5)
CALCIUM SERPL-MCNC: 9.4 MG/DL (ref 8.7–10.5)
CHLORIDE SERPL-SCNC: 92 MMOL/L (ref 95–110)
CHLORIDE SERPL-SCNC: 92 MMOL/L (ref 95–110)
CO2 SERPL-SCNC: 24 MMOL/L (ref 23–29)
CO2 SERPL-SCNC: 25 MMOL/L (ref 23–29)
CODING SYSTEM: NORMAL
CREAT SERPL-MCNC: 3 MG/DL (ref 0.5–1.4)
CREAT SERPL-MCNC: 3 MG/DL (ref 0.5–1.4)
CROSSMATCH INTERPRETATION: NORMAL
CV ECHO LV RWT: 0.22 CM
DELSYS: ABNORMAL
DIFFERENTIAL METHOD BLD: ABNORMAL
DISPENSE STATUS: NORMAL
DOP CALC AO PEAK VEL: 1.4 M/S
DOP CALC AO VTI: 23.63 CM
DOP CALC LVOT AREA: 3.6 CM2
DOP CALC LVOT DIAMETER: 2.13 CM
DOP CALC LVOT PEAK VEL: 0.99 M/S
DOP CALC LVOT STROKE VOLUME: 52.03 CM3
DOP CALCLVOT PEAK VEL VTI: 14.61 CM
E/E' RATIO: 12.78 M/S
ECHO LV POSTERIOR WALL: 0.75 CM (ref 0.6–1.1)
EOSINOPHIL # BLD AUTO: 0.1 K/UL (ref 0–0.5)
EOSINOPHIL NFR BLD: 1.6 % (ref 0–8)
ERYTHROCYTE [DISTWIDTH] IN BLOOD BY AUTOMATED COUNT: 15.1 % (ref 11.5–14.5)
EST. GFR  (NO RACE VARIABLE): 22.9 ML/MIN/1.73 M^2
EST. GFR  (NO RACE VARIABLE): 22.9 ML/MIN/1.73 M^2
FIO2: 36 %
FRACTIONAL SHORTENING: 12 % (ref 28–44)
GLUCOSE SERPL-MCNC: 147 MG/DL (ref 70–110)
GLUCOSE SERPL-MCNC: 207 MG/DL (ref 70–110)
HCO3 UR-SCNC: 23.5 MMOL/L (ref 24–28)
HCO3 UR-SCNC: 25.2 MMOL/L (ref 24–28)
HCO3 UR-SCNC: 26.7 MMOL/L (ref 24–28)
HCO3 UR-SCNC: 27.1 MMOL/L (ref 24–28)
HCT VFR BLD AUTO: 20.1 % (ref 40–54)
HCT VFR BLD AUTO: 24.1 % (ref 40–54)
HGB BLD-MCNC: 6.5 G/DL (ref 14–18)
HGB BLD-MCNC: 8 G/DL (ref 14–18)
IMM GRANULOCYTES # BLD AUTO: 0.07 K/UL (ref 0–0.04)
IMM GRANULOCYTES NFR BLD AUTO: 1.1 % (ref 0–0.5)
INTERVENTRICULAR SEPTUM: 0.77 CM (ref 0.6–1.1)
LA MAJOR: 6.05 CM
LA MINOR: 5.28 CM
LA WIDTH: 4.2 CM
LACTATE SERPL-SCNC: 0.6 MMOL/L (ref 0.5–2.2)
LEFT ATRIUM SIZE: 4.56 CM
LEFT ATRIUM VOLUME INDEX MOD: 38.3 ML/M2
LEFT ATRIUM VOLUME INDEX: 46.6 ML/M2
LEFT ATRIUM VOLUME MOD: 75.36 CM3
LEFT ATRIUM VOLUME: 91.8 CM3
LEFT INTERNAL DIMENSION IN SYSTOLE: 6.1 CM (ref 2.1–4)
LEFT VENTRICLE DIASTOLIC VOLUME INDEX: 118.99 ML/M2
LEFT VENTRICLE DIASTOLIC VOLUME: 234.41 ML
LEFT VENTRICLE MASS INDEX: 114 G/M2
LEFT VENTRICLE SYSTOLIC VOLUME INDEX: 85.2 ML/M2
LEFT VENTRICLE SYSTOLIC VOLUME: 167.9 ML
LEFT VENTRICULAR INTERNAL DIMENSION IN DIASTOLE: 6.9 CM (ref 3.5–6)
LEFT VENTRICULAR MASS: 223.94 G
LPM: 4
LV LATERAL E/E' RATIO: 10.45 M/S
LV SEPTAL E/E' RATIO: 16.43 M/S
LYMPHOCYTES # BLD AUTO: 1.2 K/UL (ref 1–4.8)
LYMPHOCYTES NFR BLD: 18.2 % (ref 18–48)
MAGNESIUM SERPL-MCNC: 2.6 MG/DL (ref 1.6–2.6)
MCH RBC QN AUTO: 29 PG (ref 27–31)
MCHC RBC AUTO-ENTMCNC: 32.3 G/DL (ref 32–36)
MCV RBC AUTO: 90 FL (ref 82–98)
METHEMOGLOBIN: 0.5 % (ref 0–3)
MODE: ABNORMAL
MONOCYTES # BLD AUTO: 0.5 K/UL (ref 0.3–1)
MONOCYTES NFR BLD: 7.1 % (ref 4–15)
MV PEAK E VEL: 1.15 M/S
NEUTROPHILS # BLD AUTO: 4.5 K/UL (ref 1.8–7.7)
NEUTROPHILS NFR BLD: 71.1 % (ref 38–73)
NRBC BLD-RTO: 0 /100 WBC
PCO2 BLDA: 35.2 MMHG (ref 35–45)
PCO2 BLDA: 40.4 MMHG (ref 35–45)
PCO2 BLDA: 41.2 MMHG (ref 35–45)
PCO2 BLDA: 43.8 MMHG (ref 35–45)
PH SMN: 7.4 [PH] (ref 7.35–7.45)
PH SMN: 7.4 [PH] (ref 7.35–7.45)
PH SMN: 7.42 [PH] (ref 7.35–7.45)
PH SMN: 7.43 [PH] (ref 7.35–7.45)
PHOSPHATE SERPL-MCNC: 5.3 MG/DL (ref 2.7–4.5)
PISA MRMAX VEL: 0.04 M/S
PISA TR MAX VEL: 2.5 M/S
PLATELET # BLD AUTO: 328 K/UL (ref 150–450)
PMV BLD AUTO: 11.1 FL (ref 9.2–12.9)
PO2 BLDA: 21 MMHG (ref 40–60)
PO2 BLDA: 22 MMHG (ref 40–60)
PO2 BLDA: 24 MMHG (ref 40–60)
PO2 BLDA: 24 MMHG (ref 40–60)
PO2 BLDA: 28 MMHG (ref 40–60)
PO2 BLDA: 29 MMHG (ref 40–60)
POC BE: -1 MMOL/L
POC BE: 0 MMOL/L
POC BE: 2 MMOL/L
POC BE: 2 MMOL/L
POC METHB: 0.5 % (ref 0–3)
POC PERFORMED BY: NORMAL
POC SATURATED O2: 34 % (ref 95–100)
POC SATURATED O2: 40 % (ref 95–100)
POC SATURATED O2: 44 % (ref 95–100)
POC SATURATED O2: 44 % (ref 95–100)
POC SATURATED O2: 53 % (ref 95–100)
POC SATURATED O2: 55 % (ref 95–100)
POC TCO2: 25 MMOL/L (ref 24–29)
POC TCO2: 26 MMOL/L (ref 24–29)
POC TCO2: 28 MMOL/L (ref 24–29)
POC TCO2: 28 MMOL/L (ref 24–29)
POC TEMPERATURE: 37 C
POCT GLUCOSE: 173 MG/DL (ref 70–110)
POCT GLUCOSE: 205 MG/DL (ref 70–110)
POCT GLUCOSE: 218 MG/DL (ref 70–110)
POCT GLUCOSE: 240 MG/DL (ref 70–110)
POTASSIUM SERPL-SCNC: 4.1 MMOL/L (ref 3.5–5.1)
POTASSIUM SERPL-SCNC: 4.3 MMOL/L (ref 3.5–5.1)
PROT S FREE AG ACT/NOR PPP IA: 80 % (ref 57–171)
PROT SERPL-MCNC: 7 G/DL (ref 6–8.4)
RA MAJOR: 5.67 CM
RA PRESSURE ESTIMATED: 15 MMHG
RA WIDTH: 4.14 CM
RBC # BLD AUTO: 2.24 M/UL (ref 4.6–6.2)
RIGHT VENTRICULAR END-DIASTOLIC DIMENSION: 5.18 CM
RV TB RVSP: 18 MMHG
SAMPLE: ABNORMAL
SINUS: 3.01 CM
SITE: ABNORMAL
SODIUM SERPL-SCNC: 130 MMOL/L (ref 136–145)
SODIUM SERPL-SCNC: 132 MMOL/L (ref 136–145)
SPECIMEN SOURCE: NORMAL
STJ: 2.77 CM
TDI LATERAL: 0.11 M/S
TDI SEPTAL: 0.07 M/S
TDI: 0.09 M/S
TR MAX PG: 25 MMHG
TRANS ERYTHROCYTES VOL PATIENT: NORMAL ML
TRICUSPID ANNULAR PLANE SYSTOLIC EXCURSION: 2.21 CM
TV REST PULMONARY ARTERY PRESSURE: 40 MMHG
WBC # BLD AUTO: 6.33 K/UL (ref 3.9–12.7)
Z-SCORE OF LEFT VENTRICULAR DIMENSION IN END DIASTOLE: 1.98
Z-SCORE OF LEFT VENTRICULAR DIMENSION IN END SYSTOLE: 4.44

## 2023-11-21 PROCEDURE — 94799 UNLISTED PULMONARY SVC/PX: CPT | Mod: NTX

## 2023-11-21 PROCEDURE — 63600367 HC NITRIC OXIDE PER HOUR: Mod: NTX

## 2023-11-21 PROCEDURE — 25000003 PHARM REV CODE 250: Mod: NTX | Performed by: STUDENT IN AN ORGANIZED HEALTH CARE EDUCATION/TRAINING PROGRAM

## 2023-11-21 PROCEDURE — 80048 BASIC METABOLIC PNL TOTAL CA: CPT | Mod: NTX,XB | Performed by: REGISTERED NURSE

## 2023-11-21 PROCEDURE — 86920 COMPATIBILITY TEST SPIN: CPT | Mod: NTX | Performed by: REGISTERED NURSE

## 2023-11-21 PROCEDURE — 5A1955Z RESPIRATORY VENTILATION, GREATER THAN 96 CONSECUTIVE HOURS: ICD-10-PCS | Performed by: INTERNAL MEDICINE

## 2023-11-21 PROCEDURE — 85014 HEMATOCRIT: CPT | Mod: NTX | Performed by: INTERNAL MEDICINE

## 2023-11-21 PROCEDURE — 30233N1 TRANSFUSION OF NONAUTOLOGOUS RED BLOOD CELLS INTO PERIPHERAL VEIN, PERCUTANEOUS APPROACH: ICD-10-PCS | Performed by: INTERNAL MEDICINE

## 2023-11-21 PROCEDURE — 27201423 OPTIME MED/SURG SUP & DEVICES STERILE SUPPLY: Mod: NTX | Performed by: INTERNAL MEDICINE

## 2023-11-21 PROCEDURE — 20000000 HC ICU ROOM: Mod: NTX

## 2023-11-21 PROCEDURE — 63600175 PHARM REV CODE 636 W HCPCS: Mod: NTX | Performed by: REGISTERED NURSE

## 2023-11-21 PROCEDURE — 5A1D70Z PERFORMANCE OF URINARY FILTRATION, INTERMITTENT, LESS THAN 6 HOURS PER DAY: ICD-10-PCS | Performed by: INTERNAL MEDICINE

## 2023-11-21 PROCEDURE — 5A02210 ASSISTANCE WITH CARDIAC OUTPUT USING BALLOON PUMP, CONTINUOUS: ICD-10-PCS | Performed by: INTERNAL MEDICINE

## 2023-11-21 PROCEDURE — C1894 INTRO/SHEATH, NON-LASER: HCPCS | Mod: NTX | Performed by: INTERNAL MEDICINE

## 2023-11-21 PROCEDURE — 84100 ASSAY OF PHOSPHORUS: CPT | Mod: NTX | Performed by: STUDENT IN AN ORGANIZED HEALTH CARE EDUCATION/TRAINING PROGRAM

## 2023-11-21 PROCEDURE — 82803 BLOOD GASES ANY COMBINATION: CPT | Mod: NTX

## 2023-11-21 PROCEDURE — 99291 CRITICAL CARE FIRST HOUR: CPT | Mod: NTX,,, | Performed by: REGISTERED NURSE

## 2023-11-21 PROCEDURE — 33967 INSERT I-AORT PERCUT DEVICE: CPT | Mod: NTX | Performed by: INTERNAL MEDICINE

## 2023-11-21 PROCEDURE — P9021 RED BLOOD CELLS UNIT: HCPCS | Mod: NTX | Performed by: REGISTERED NURSE

## 2023-11-21 PROCEDURE — 83735 ASSAY OF MAGNESIUM: CPT | Mod: NTX | Performed by: STUDENT IN AN ORGANIZED HEALTH CARE EDUCATION/TRAINING PROGRAM

## 2023-11-21 PROCEDURE — 25500020 PHARM REV CODE 255: Mod: NTX | Performed by: INTERNAL MEDICINE

## 2023-11-21 PROCEDURE — C1769 GUIDE WIRE: HCPCS | Mod: NTX | Performed by: INTERNAL MEDICINE

## 2023-11-21 PROCEDURE — 80053 COMPREHEN METABOLIC PANEL: CPT | Mod: NTX | Performed by: STUDENT IN AN ORGANIZED HEALTH CARE EDUCATION/TRAINING PROGRAM

## 2023-11-21 PROCEDURE — 25000003 PHARM REV CODE 250: Mod: NTX | Performed by: REGISTERED NURSE

## 2023-11-21 PROCEDURE — 94761 N-INVAS EAR/PLS OXIMETRY MLT: CPT | Mod: NTX,XB

## 2023-11-21 PROCEDURE — 85025 COMPLETE CBC W/AUTO DIFF WBC: CPT | Mod: NTX | Performed by: STUDENT IN AN ORGANIZED HEALTH CARE EDUCATION/TRAINING PROGRAM

## 2023-11-21 PROCEDURE — 27100094 HC IABP, SET-UP: Mod: NTX

## 2023-11-21 PROCEDURE — 83605 ASSAY OF LACTIC ACID: CPT | Mod: NTX | Performed by: STUDENT IN AN ORGANIZED HEALTH CARE EDUCATION/TRAINING PROGRAM

## 2023-11-21 PROCEDURE — 99900035 HC TECH TIME PER 15 MIN (STAT): Mod: NTX

## 2023-11-21 PROCEDURE — 85730 THROMBOPLASTIN TIME PARTIAL: CPT | Mod: NTX | Performed by: STUDENT IN AN ORGANIZED HEALTH CARE EDUCATION/TRAINING PROGRAM

## 2023-11-21 PROCEDURE — 85018 HEMOGLOBIN: CPT | Mod: NTX | Performed by: INTERNAL MEDICINE

## 2023-11-21 PROCEDURE — 25000003 PHARM REV CODE 250: Mod: NTX | Performed by: INTERNAL MEDICINE

## 2023-11-21 PROCEDURE — 25000003 PHARM REV CODE 250: Mod: NTX | Performed by: PHYSICIAN ASSISTANT

## 2023-11-21 PROCEDURE — 33967 INSERT I-AORT PERCUT DEVICE: CPT | Mod: NTX,,, | Performed by: INTERNAL MEDICINE

## 2023-11-21 PROCEDURE — 83050 HGB METHEMOGLOBIN QUAN: CPT | Mod: NTX

## 2023-11-21 PROCEDURE — 27000221 HC OXYGEN, UP TO 24 HOURS: Mod: NTX

## 2023-11-21 PROCEDURE — 99232 SBSQ HOSP IP/OBS MODERATE 35: CPT | Mod: NTX,,, | Performed by: NURSE PRACTITIONER

## 2023-11-21 PROCEDURE — 36430 TRANSFUSION BLD/BLD COMPNT: CPT

## 2023-11-21 RX ORDER — HYDROCODONE BITARTRATE AND ACETAMINOPHEN 500; 5 MG/1; MG/1
TABLET ORAL
Status: DISCONTINUED | OUTPATIENT
Start: 2023-11-21 | End: 2023-12-01

## 2023-11-21 RX ORDER — DIPHENHYDRAMINE HCL 50 MG
50 CAPSULE ORAL ONCE
Status: COMPLETED | OUTPATIENT
Start: 2023-11-21 | End: 2023-11-21

## 2023-11-21 RX ORDER — ONDANSETRON 8 MG/1
8 TABLET, ORALLY DISINTEGRATING ORAL EVERY 8 HOURS PRN
Status: DISCONTINUED | OUTPATIENT
Start: 2023-11-21 | End: 2023-12-06

## 2023-11-21 RX ORDER — LIDOCAINE HYDROCHLORIDE 20 MG/ML
INJECTION, SOLUTION INFILTRATION; PERINEURAL
Status: DISCONTINUED | OUTPATIENT
Start: 2023-11-21 | End: 2023-11-21 | Stop reason: HOSPADM

## 2023-11-21 RX ORDER — HEPARIN SOD,PORCINE/0.9 % NACL 1000/500ML
INTRAVENOUS SOLUTION INTRAVENOUS
Status: DISCONTINUED | OUTPATIENT
Start: 2023-11-21 | End: 2023-11-21 | Stop reason: HOSPADM

## 2023-11-21 RX ADMIN — INSULIN ASPART 2 UNITS: 100 INJECTION, SOLUTION INTRAVENOUS; SUBCUTANEOUS at 09:11

## 2023-11-21 RX ADMIN — ACETAMINOPHEN 650 MG: 325 TABLET ORAL at 09:11

## 2023-11-21 RX ADMIN — PANTOPRAZOLE SODIUM 40 MG: 40 TABLET, DELAYED RELEASE ORAL at 06:11

## 2023-11-21 RX ADMIN — DIPHENHYDRAMINE HYDROCHLORIDE 50 MG: 50 CAPSULE ORAL at 09:11

## 2023-11-21 RX ADMIN — Medication 400 MG: at 09:11

## 2023-11-21 RX ADMIN — FUROSEMIDE 40 MG/HR: 10 INJECTION, SOLUTION INTRAMUSCULAR; INTRAVENOUS at 12:11

## 2023-11-21 RX ADMIN — HYDRALAZINE HYDROCHLORIDE 25 MG: 25 TABLET, FILM COATED ORAL at 09:11

## 2023-11-21 RX ADMIN — ATORVASTATIN CALCIUM 40 MG: 40 TABLET, FILM COATED ORAL at 09:11

## 2023-11-21 RX ADMIN — HYDRALAZINE HYDROCHLORIDE 25 MG: 25 TABLET, FILM COATED ORAL at 06:11

## 2023-11-21 RX ADMIN — INSULIN DETEMIR 18 UNITS: 100 INJECTION, SOLUTION SUBCUTANEOUS at 09:11

## 2023-11-21 RX ADMIN — ASPIRIN 81 MG: 81 TABLET, COATED ORAL at 09:11

## 2023-11-21 RX ADMIN — INSULIN ASPART 4 UNITS: 100 INJECTION, SOLUTION INTRAVENOUS; SUBCUTANEOUS at 12:11

## 2023-11-21 RX ADMIN — GABAPENTIN 300 MG: 300 CAPSULE ORAL at 09:11

## 2023-11-21 RX ADMIN — INSULIN ASPART 12 UNITS: 100 INJECTION, SOLUTION INTRAVENOUS; SUBCUTANEOUS at 12:11

## 2023-11-21 RX ADMIN — SENNOSIDES AND DOCUSATE SODIUM 2 TABLET: 8.6; 5 TABLET ORAL at 06:11

## 2023-11-21 RX ADMIN — AMIODARONE HYDROCHLORIDE 400 MG: 200 TABLET ORAL at 09:11

## 2023-11-21 NOTE — PLAN OF CARE
Brief Overnight Plan of Care    Updates:  Extremities slightly cool but unchanged  Lactate 1.3  Vitals stable and mentating well  Still minimal UOP currently but just received lasix/diuril    11PM Hemodynamics:  Parameters    CVP 11, good waveform   SVO2 34   CO 4.7   CI 2.4   SVR 1131     Hemodynamics still stable from [facility] earlier today and from hemos 2 hours ago, using swan which was left in from [facility].  Hemos prior to swan were with simple triple lumen catheter.  Likely spurious values from triple lumen.    Plan:  - No changes for now  - Closely follow UOP after Diuril, Lasix IVP given within past hour  - Continue q4h hemos  - No need for emergent overnight IABP at this time      Discussed with staff    Connor Gillies, DO  PGY-IV, Cardiology

## 2023-11-21 NOTE — ASSESSMENT & PLAN NOTE
Pt with known ICM with an EF of 25% on home  presenting with decompensated HF.  Not in cardiogenic shock    RHC 11/14: RA 14, PA 70/35, PCWP 32, CO 4.1, CI 2.1.   Repeat RHC 11/20 RA RA  20  PA 60/29 (39)  PCWP 29    CO  4.6 l/min  CI  2.3 l/min/m2    - Home  gtt at 5  - home GDMT: entresto 24/26mg BID (on hold 2/2 IVÁN), Hydralazine 25 q8h being continued  - home diuretics: Was taking lasix 40 bid  - Upgrading to IABP today   - Continue VAD/OHTx workup. (Leaning towards LVAD) On step 4  -Blood type A+  -PT/OT and nutrition   -Continue current support with  5, Ketty 10ppm, Lasix 40mg/hr  -2D echo pending

## 2023-11-21 NOTE — SUBJECTIVE & OBJECTIVE
Past Medical History:   Diagnosis Date    CAD (coronary artery disease)     Diabetes mellitus     HFrEF (heart failure with reduced ejection fraction)     ICD (implantable cardioverter-defibrillator) in place     MI, old        Past Surgical History:   Procedure Laterality Date    CARDIAC SURGERY      RIGHT HEART CATHETERIZATION Right 10/10/2023    Procedure: INSERTION, CATHETER, RIGHT HEART;  Surgeon: Bin Gandhi MD;  Location: [facility] CATH LAB;  Service: Cardiology;  Laterality: Right;    RIGHT HEART CATHETERIZATION Right 10/13/2023    Procedure: INSERTION, CATHETER, RIGHT HEART;  Surgeon: Walter Mcintyre MD;  Location: [facility] CATH LAB;  Service: Cardiology;  Laterality: Right;    RIGHT HEART CATHETERIZATION  11/13/2023    RIGHT HEART CATHETERIZATION Right 11/13/2023    Procedure: INSERTION, CATHETER, RIGHT HEART;  Surgeon: Juventino Bermudez Jr., MD;  Location: [facility] CATH LAB;  Service: Cardiology;  Laterality: Right;       Review of patient's allergies indicates:  No Known Allergies    PTA Medications   Medication Sig    amiodarone (PACERONE) 400 MG tablet Take 1 tablet (400 mg total) by mouth once daily.    apixaban (ELIQUIS) 5 mg Tab Take 1 tablet (5 mg total) by mouth 2 (two) times daily.    aspirin (ECOTRIN) 81 MG EC tablet Take 81 mg by mouth once daily.    atorvastatin (LIPITOR) 40 MG tablet Take 40 mg by mouth.    DOBUTamine (DOBUTREX) 1,000 mg/250 mL (4,000 mcg/mL) infusion Inject 389.5 mcg/min into the vein continuous.    fish oil-omega-3 fatty acids 300-1,000 mg capsule Take 2 g by mouth once daily.    furosemide (LASIX) 40 MG tablet Take 1 tablet (40 mg total) by mouth 2 (two) times daily.    gabapentin (NEURONTIN) 300 MG capsule Take 300 mg by mouth nightly as needed.    multivitamin capsule Take 1 capsule by mouth once daily.    pantoprazole (PROTONIX) 40 MG tablet Take 1 tablet (40 mg total) by mouth before breakfast.    potassium chloride SA (K-DUR,KLOR-CON) 20 MEQ tablet Take 1 tablet (20 mEq  total) by mouth once daily.    sacubitriL-valsartan (ENTRESTO) 24-26 mg per tablet Take 1 tablet by mouth 2 (two) times daily.    metOLazone (ZAROXOLYN) 2.5 MG tablet Take 1 tablet (2.5 mg total) by mouth once daily. Thursday 11/9 and Saturday 11/10    torsemide (DEMADEX) 20 MG Tab Take 2 tablets (40 mg total) by mouth 2 (two) times a day.     Family History    None       Tobacco Use    Smoking status: Every Day     Current packs/day: 0.50     Types: Cigarettes    Smokeless tobacco: Not on file   Substance and Sexual Activity    Alcohol use: Yes     Comment: rarely    Drug use: No    Sexual activity: Not on file     Review of Systems   Constitutional: Negative.   HENT: Negative.     Cardiovascular:  Negative for chest pain, claudication, cyanosis, dyspnea on exertion, irregular heartbeat, leg swelling, near-syncope, orthopnea, palpitations and paroxysmal nocturnal dyspnea.   Respiratory: Negative.     Endocrine: Negative.    Musculoskeletal: Negative.    Gastrointestinal: Negative.    Genitourinary: Negative.    Neurological: Negative.      Objective:     Vital Signs (Most Recent):  Temp: 97.5 °F (36.4 °C) (11/21/23 0302)  Pulse: 75 (11/21/23 0502)  Resp: 16 (11/21/23 0502)  BP: 94/62 (11/21/23 0634)  SpO2: 100 % (11/21/23 0502) Vital Signs (24h Range):  Temp:  [97.4 °F (36.3 °C)-98.2 °F (36.8 °C)] 97.5 °F (36.4 °C)  Pulse:  [72-88] 75  Resp:  [10-26] 16  SpO2:  [99 %-100 %] 100 %  BP: ()/(55-72) 94/62     Weight: 76 kg (167 lb 8.8 oz)  Body mass index is 22.72 kg/m².    SpO2: 100 %         Intake/Output Summary (Last 24 hours) at 11/21/2023 0642  Last data filed at 11/21/2023 0502  Gross per 24 hour   Intake 1674.51 ml   Output 1055 ml   Net 619.51 ml         Lines/Drains/Airways       Central Venous Catheter Line  Duration              Introducer Single Lumen 11/20/23 1330 Internal Jugular Left <1 day    Pulmonary Artery Catheter Assessment  11/20/23 1330 Internal Jugular Left <1 day              Drain   Duration                  Urine Pouch  11/20/23 2202 other (see comments) <1 day              Peripheral Intravenous Line  Duration                  Midline Catheter Insertion/Assessment  - Single Lumen 11/14/23 1113 Left brachial vein 20g x 10cm 6 days                     Physical Exam  Vitals and nursing note reviewed.   Constitutional:       General: He is not in acute distress.     Appearance: Normal appearance. He is normal weight. He is not ill-appearing or diaphoretic.   HENT:      Head: Normocephalic and atraumatic.   Eyes:      Pupils: Pupils are equal, round, and reactive to light.   Cardiovascular:      Rate and Rhythm: Normal rate and regular rhythm.      Pulses:           Radial pulses are 2+ on the right side and 2+ on the left side.        Femoral pulses are 3+ on the right side and 3+ on the left side.       Popliteal pulses are 2+ on the right side and 2+ on the left side.        Dorsalis pedis pulses are 2+ on the right side and 2+ on the left side.        Posterior tibial pulses are 1+ on the right side and 1+ on the left side.      Heart sounds: Normal heart sounds.      Comments: Posterior tibiales pulses heard with ultrasound, rest palpable  Pulmonary:      Effort: Pulmonary effort is normal.      Breath sounds: Normal breath sounds.   Abdominal:      General: Abdomen is flat. Bowel sounds are normal. There is no distension.      Palpations: Abdomen is soft. There is no mass.      Tenderness: There is no abdominal tenderness.   Musculoskeletal:         General: Normal range of motion.   Skin:     General: Skin is warm.      Capillary Refill: Capillary refill takes less than 2 seconds.   Neurological:      General: No focal deficit present.      Mental Status: He is alert and oriented to person, place, and time.            Significant Labs:     Recent Labs   Lab 11/19/23  1642 11/20/23  0325 11/20/23  0855 11/21/23  0323   WBC 6.12 5.57  --  6.33   HGB 7.1* 6.4*  --  6.5*   HCT 21.8* 20.7* 21*  20.1*    267  --  328         Recent Labs   Lab 11/19/23  0336 11/19/23  1642 11/20/23  0325 11/21/23  0323    131* 132* 130*   K 3.8 4.2 4.2 4.3   CL 97 94* 94* 92*   CO2 26 23 24 24   BUN 87* 85* 88* 94*   CREATININE 3.1* 2.9* 2.8* 3.0*   CALCIUM 9.0 8.9 8.9 9.2   PHOS 4.4  --  3.8 5.3*         Recent Labs   Lab 11/15/23  1720 11/18/23  1236 11/19/23  1642 11/20/23  0325 11/21/23  0323   ALKPHOS  --    < > 83 77 82   BILITOT  --    < > 0.4 0.5 0.5   BILIDIR 0.4*  --   --   --   --    PROT  --    < > 7.2 6.5 7.0   ALT  --    < > 17 21 33   AST  --    < > 12 20 28    < > = values in this interval not displayed.         Recent Labs   Lab 11/15/23  1720   CHOL 107*   TRIG 74   HDL 49       Significant Imaging:     DIMITRIS 11/20/23    Right resting DIMITRIS 0.75, suggestive of moderate right lower extremity arterial disease.    Left resting DIMITRIS 0.76, is suggestive moderate left lower extremity arterial disease.    PVR waveforms are mildly dampened at the calf level on the right, and moderately to severely dampened at the ankle level bilaterally.    RHC 11/20/23    The estimated blood loss was none.    The filling pressures on the right and left were severely elevated. Pulmonary hypertension was moderate to severe.    RA  20  PA 60/29 (39)  PCWP 29    CO  4.6 l/min  CI  2.3 l/min/m2    PVR 2.2 DARBY.    Condition: on DObutamine 5 mcg/kg/min.    RHC 11/13/23  Study performed on  5 mcg/kg/min:     Right atrial pressure is moderately elevated.     Pulmonary artery pressure is moderately elevated.     Pulmonary wedge pressure is moderately severely elevated.     Pulmonary vascular resistance is mildly elevated 3.7 Wood units.     Systemic vascular resistance is 1327.     Reduced cardiac power output 0.38.     Normal CHINYERE 2.50.     RA: 19/ 16/ 14 RV: 65/ 20 PA: 70/ 35/ 47 PWP: 32/ 45/ 32 .   Cardiac output was 4.1 by Ana. Cardiac index is 2.1 L/min/m2.   O2 Sat: PA 32%.   Pulmonary vascular resistance: 293. Systemic  vascular resistance: 1327.      TTE 10/19/23     Left Ventricle: The left ventricle is severely dilated. Moderately increased ventricular mass. Normal wall thickness. There is moderate eccentric hypertrophy. regional wall motion abnormalities present. There is severely reduced systolic function with a visually estimated ejection fraction of 15 - 20%. Biplane (2D) method of discs ejection fraction is 18%. There is diastolic dysfunction.    Left Atrium: Left atrium is severely dilated.    Right Ventricle: Normal right ventricular cavity size. Systolic function is normal.    Aortic Valve: There is mild aortic valve sclerosis.    Mitral Valve: There is mild to moderate regurgitation.    IVC/SVC: Intermediate venous pressure at 8 mmHg.

## 2023-11-21 NOTE — SUBJECTIVE & OBJECTIVE
Interval History: RHC with leave in [place] yesterday showed severe elevated right and left filling pressures. Additional Diuril given overnight for increase in CVP. Renal function worse today. No complaints this AM . Will go for IABP placement today. Remains supported on  5, Ketty, and Lasix 40mg/hr.     Hemodynamics:   CVP 12  Svo2 44  CO 5.4  CI 2.7  SVR 1,015      Continuous Infusions:   sodium chloride 0.9%      sodium chloride 0.9% 10 mL/hr at 11/21/23 1001    DOBUTamine 5 mcg/kg/min (11/21/23 1001)    furosemide (LASIX) 500 mg in 50 mL infusion (conc: 10 mg/mL) 40 mg/hr (11/21/23 1001)    heparin (porcine) in D5W Stopped (11/21/23 0858)    nitric oxide gas      sodium chloride 0.9% 250 mL/hr at 11/20/23 2002     Scheduled Meds:   amiodarone  400 mg Oral Daily    aspirin  81 mg Oral Daily    atorvastatin  40 mg Oral Daily    hydrALAZINE  25 mg Oral Q8H    insulin aspart U-100  12 Units Subcutaneous TIDWM    insulin detemir U-100  18 Units Subcutaneous QHS    magnesium oxide  400 mg Oral Daily    multivitamin  1 tablet Oral Daily    pantoprazole  40 mg Oral Before breakfast     PRN Meds:sodium chloride 0.9%, acetaminophen, dextrose 10%, dextrose 10%, gabapentin, glucagon (human recombinant), glucose, glucose, heparin (PORCINE), heparin (PORCINE), insulin aspart U-100, ondansetron, senna-docusate 8.6-50 mg, sodium chloride 0.9%, sodium chloride 0.9%    Review of patient's allergies indicates:  No Known Allergies  Objective:     Vital Signs (Most Recent):  Temp: 97.5 °F (36.4 °C) (11/21/23 0701)  Pulse: 79 (11/21/23 0901)  Resp: 15 (11/21/23 0901)  BP: 115/62 (11/21/23 0901)  SpO2: 100 % (11/21/23 0901) Vital Signs (24h Range):  Temp:  [97.4 °F (36.3 °C)-97.9 °F (36.6 °C)] 97.5 °F (36.4 °C)  Pulse:  [72-79] 79  Resp:  [10-26] 15  SpO2:  [99 %-100 %] 100 %  BP: ()/(55-73) 115/62     Patient Vitals for the past 72 hrs (Last 3 readings):   Weight   11/21/23 0400 76 kg (167 lb 8.8 oz)   11/20/23 0400 75.5 kg (166  lb 7.2 oz)   11/19/23 0400 74.8 kg (164 lb 14.5 oz)       Body mass index is 22.72 kg/m².      Intake/Output Summary (Last 24 hours) at 11/21/2023 1142  Last data filed at 11/21/2023 1001  Gross per 24 hour   Intake 1462.59 ml   Output 2215 ml   Net -752.41 ml         Hemodynamic Parameters:  PAP: (50-68)/(14-30) 58/15  PAP (Mean):  [29 mmHg-44 mmHg] 33 mmHg    Telemetry: NSR        Physical Exam  HENT:      Head: Normocephalic and atraumatic.   Eyes:      Pupils: Pupils are equal, round, and reactive to light.   Neck:      Comments: Jvp mid neck   Cardiovascular:      Rate and Rhythm: Normal rate and regular rhythm.   Pulmonary:      Effort: Pulmonary effort is normal.      Breath sounds: Normal breath sounds.   Abdominal:      General: Bowel sounds are normal.      Palpations: Abdomen is soft.   Musculoskeletal:      Cervical back: Normal range of motion and neck supple.      Right lower leg: No edema.      Left lower leg: No edema.   Skin:     General: Skin is warm and dry.   Neurological:      General: No focal deficit present.      Mental Status: He is oriented to person, place, and time.            Significant Labs:  CBC:  Recent Labs   Lab 11/19/23  1642 11/20/23  0325 11/20/23  0855 11/21/23 0323   WBC 6.12 5.57  --  6.33   RBC 2.43* 2.30*  --  2.24*   HGB 7.1* 6.4*  --  6.5*   HCT 21.8* 20.7* 21* 20.1*    267  --  328   MCV 90 90  --  90   MCH 29.2 27.8  --  29.0   MCHC 32.6 30.9*  --  32.3       BNP:  Recent Labs   Lab 11/18/23  1236   *       CMP:  Recent Labs   Lab 11/19/23  1642 11/20/23  0325 11/21/23  0323   * 226* 147*   CALCIUM 8.9 8.9 9.2   ALBUMIN 3.0* 2.9* 3.0*   PROT 7.2 6.5 7.0   * 132* 130*   K 4.2 4.2 4.3   CO2 23 24 24   CL 94* 94* 92*   BUN 85* 88* 94*   CREATININE 2.9* 2.8* 3.0*   ALKPHOS 83 77 82   ALT 17 21 33   AST 12 20 28   BILITOT 0.4 0.5 0.5        Coagulation:   Recent Labs   Lab 11/15/23  1601 11/16/23  0436 11/20/23  0325 11/20/23  1958 11/21/23  0323  "  INR 1.1  --   --   --   --    APTT  --    < > 54.1* 42.2* 51.0*    < > = values in this interval not displayed.       LDH:  No results for input(s): "LDH" in the last 72 hours.  Microbiology:  Microbiology Results (last 7 days)       ** No results found for the last 168 hours. **            BMP:   Recent Labs   Lab 11/21/23  0323   *   *   K 4.3   CL 92*   CO2 24   BUN 94*   CREATININE 3.0*   CALCIUM 9.2   MG 2.6       I have reviewed all pertinent labs within the past 24 hours.    Estimated Creatinine Clearance: 27.8 mL/min (A) (based on SCr of 3 mg/dL (H)).    Diagnostic Results:  I have reviewed all pertinent imaging results/findings within the past 24 hours.  "

## 2023-11-21 NOTE — ASSESSMENT & PLAN NOTE
IVÁN on CKD2. Baseline Cr of 1.7-2.0.   -Currently 3.0, trending up  - Trend along with CVP, continue diuresis with Lasix gtt @40mg/hr   - Hold ARNi, entresto  -Trend BMPs daily

## 2023-11-21 NOTE — NURSING
Patient seen for follow up of VAD education, sitting up in bed with spouse at bedside, both AAOx4. Patient states he is hopefull he will get approved for a VAD and be able to go to surgery soon. He wants to feel well enough to stay out of the hospital, ride his motorcycle and go fishing. Discussed precautions around motorcycle and fishing with a VAD, both patient and spouse verbalized understanding. No further questions at this time. Plan for IABP today. Will continue to follow.

## 2023-11-21 NOTE — PROGRESS NOTES
Update:  SW attempted to f/up with pt. Pt currently off unit in a procedure. CRISTAL will follow up at a later time.

## 2023-11-21 NOTE — PLAN OF CARE
Brief Overnight Plan of Care    Updates:  New lactate 0.6  Hgb 6.5  Lasix push, diuril around 10PM  Still with less than 100cc/hr UOP, see below  No other clinical changes    3AM Hemodynamics:  Parameters    CVP 16   SVO2 44   CO 5.4   CI 2.8          I/O this shift:  In: 617 [I.V.:318; IV Piggyback:298.9]  Out: 475 [Urine:475]  +1 unmeasured urine occurrence    Plan:  - No changes in current plan of care  - Close I/O  - Plan for IABP in cath lab later this morning      Connor Gillies, DO  PGY-IV, Cardiology

## 2023-11-21 NOTE — PROGRESS NOTES
[facility] - Cardiac Intensive Care  Heart Transplant  Progress Note    Patient Name: Radha Abbott  MRN: [id number]  Admission Date: 11/9/2023  Hospital Length of Stay: 12 days  Attending Physician: Sandhya Price MD  Primary Care Provider: Vasu Kong MD  Principal Problem:Acute on chronic combined systolic and diastolic CHF, NYHA class 4    Subjective:   Interval History: RHC with leave in [place] yesterday showed severe elevated right and left filling pressures. Additional Diuril given overnight for increase in CVP. Renal function worse today. No complaints this AM . Will go for IABP placement today. Remains supported on  5, Ketty, and Lasix 40mg/hr.     Hemodynamics:   CVP 12  Svo2 44  CO 5.4  CI 2.7  SVR 1,015      Continuous Infusions:   sodium chloride 0.9%      sodium chloride 0.9% 10 mL/hr at 11/21/23 1001    DOBUTamine 5 mcg/kg/min (11/21/23 1001)    furosemide (LASIX) 500 mg in 50 mL infusion (conc: 10 mg/mL) 40 mg/hr (11/21/23 1001)    heparin (porcine) in D5W Stopped (11/21/23 0858)    nitric oxide gas      sodium chloride 0.9% 250 mL/hr at 11/20/23 2002     Scheduled Meds:   amiodarone  400 mg Oral Daily    aspirin  81 mg Oral Daily    atorvastatin  40 mg Oral Daily    hydrALAZINE  25 mg Oral Q8H    insulin aspart U-100  12 Units Subcutaneous TIDWM    insulin detemir U-100  18 Units Subcutaneous QHS    magnesium oxide  400 mg Oral Daily    multivitamin  1 tablet Oral Daily    pantoprazole  40 mg Oral Before breakfast     PRN Meds:sodium chloride 0.9%, acetaminophen, dextrose 10%, dextrose 10%, gabapentin, glucagon (human recombinant), glucose, glucose, heparin (PORCINE), heparin (PORCINE), insulin aspart U-100, ondansetron, senna-docusate 8.6-50 mg, sodium chloride 0.9%, sodium chloride 0.9%    Review of patient's allergies indicates:  No Known Allergies  Objective:     Vital Signs (Most Recent):  Temp: 97.5 °F (36.4 °C) (11/21/23 0701)  Pulse: 79 (11/21/23 0901)  Resp: 15 (11/21/23 0901)  BP:  115/62 (11/21/23 0901)  SpO2: 100 % (11/21/23 0901) Vital Signs (24h Range):  Temp:  [97.4 °F (36.3 °C)-97.9 °F (36.6 °C)] 97.5 °F (36.4 °C)  Pulse:  [72-79] 79  Resp:  [10-26] 15  SpO2:  [99 %-100 %] 100 %  BP: ()/(55-73) 115/62     Patient Vitals for the past 72 hrs (Last 3 readings):   Weight   11/21/23 0400 76 kg (167 lb 8.8 oz)   11/20/23 0400 75.5 kg (166 lb 7.2 oz)   11/19/23 0400 74.8 kg (164 lb 14.5 oz)       Body mass index is 22.72 kg/m².      Intake/Output Summary (Last 24 hours) at 11/21/2023 1142  Last data filed at 11/21/2023 1001  Gross per 24 hour   Intake 1462.59 ml   Output 2215 ml   Net -752.41 ml         Hemodynamic Parameters:  PAP: (50-68)/(14-30) 58/15  PAP (Mean):  [29 mmHg-44 mmHg] 33 mmHg    Telemetry: NSR        Physical Exam  HENT:      Head: Normocephalic and atraumatic.   Eyes:      Pupils: Pupils are equal, round, and reactive to light.   Neck:      Comments: Jvp mid neck   Cardiovascular:      Rate and Rhythm: Normal rate and regular rhythm.   Pulmonary:      Effort: Pulmonary effort is normal.      Breath sounds: Normal breath sounds.   Abdominal:      General: Bowel sounds are normal.      Palpations: Abdomen is soft.   Musculoskeletal:      Cervical back: Normal range of motion and neck supple.      Right lower leg: No edema.      Left lower leg: No edema.   Skin:     General: Skin is warm and dry.   Neurological:      General: No focal deficit present.      Mental Status: He is oriented to person, place, and time.            Significant Labs:  CBC:  Recent Labs   Lab 11/19/23  1642 11/20/23  0325 11/20/23  0855 11/21/23  0323   WBC 6.12 5.57  --  6.33   RBC 2.43* 2.30*  --  2.24*   HGB 7.1* 6.4*  --  6.5*   HCT 21.8* 20.7* 21* 20.1*    267  --  328   MCV 90 90  --  90   MCH 29.2 27.8  --  29.0   MCHC 32.6 30.9*  --  32.3       BNP:  Recent Labs   Lab 11/18/23  1236   *       CMP:  Recent Labs   Lab 11/19/23  1642 11/20/23  0325 11/21/23  0323   * 226*  "147*   CALCIUM 8.9 8.9 9.2   ALBUMIN 3.0* 2.9* 3.0*   PROT 7.2 6.5 7.0   * 132* 130*   K 4.2 4.2 4.3   CO2 23 24 24   CL 94* 94* 92*   BUN 85* 88* 94*   CREATININE 2.9* 2.8* 3.0*   ALKPHOS 83 77 82   ALT 17 21 33   AST 12 20 28   BILITOT 0.4 0.5 0.5        Coagulation:   Recent Labs   Lab 11/15/23  1601 11/16/23  0436 11/20/23  0325 11/20/23 1958 11/21/23  0323   INR 1.1  --   --   --   --    APTT  --    < > 54.1* 42.2* 51.0*    < > = values in this interval not displayed.       LDH:  No results for input(s): "LDH" in the last 72 hours.  Microbiology:  Microbiology Results (last 7 days)       ** No results found for the last 168 hours. **            BMP:   Recent Labs   Lab 11/21/23  0323   *   *   K 4.3   CL 92*   CO2 24   BUN 94*   CREATININE 3.0*   CALCIUM 9.2   MG 2.6       I have reviewed all pertinent labs within the past 24 hours.    Estimated Creatinine Clearance: 27.8 mL/min (A) (based on SCr of 3 mg/dL (H)).    Diagnostic Results:  I have reviewed all pertinent imaging results/findings within the past 24 hours.  Assessment and Plan:     63 year old male with hx of CAD s/p 3v CABG (unclear anatomy, 2009), ICM with a recent EF of 15-20% s/p ICD (medtronik 2009), DM2 (a1c 7.7), HTN, HLD, Vfib on amio who presents to the ED with CC of SOB     Pt was recently admitted to [facility] [place].  He was started on a Lasix gtt and did well.  Started on gDMT and discharged home on  5 with plans to follow up in HTS clinic for ongoing transplant evaluation at another facility.  He says that about a few days ago he started to notice LE swelling.  This turned into Nelson and orthopnea.  He says he can't walk to the bathroom without getting SOB.  He also complains of weight gain.  He takes torsemide 20mg BID at home and was told to trial additional Lasix which he did without any improvement.  He was rx metolazone but has not been filled.  He came to the ED     In the ED he was AF with stable VS on RA.  " CBC showing chronic anemia.  CMP notable for acute on chronic CKD with baseline around 2.1 and Cr now 2.6.  BNP elevated.  Lactate neg.  CXR showing pulmonary edema.  I evaluated the pt at the bedside.  Bedside TTE showing CVP >15.  He was subsequently admitted to the CCU for diuresis.     He was continued on his home  and was started on a lasix gtt, which he responded well to overnight (net -1700cc. He feels much better this morning as well. Our transplant coordinators have been working on insurance approval and he is now being transferred to [facility] service for transplant evaluation.     * Acute on chronic combined systolic and diastolic CHF, NYHA class 4  Pt with known ICM with an EF of 25% on home  presenting with decompensated HF.  Not in cardiogenic shock    RHC 11/14: RA 14, PA 70/35, PCWP 32, CO 4.1, CI 2.1.   Repeat RHC 11/20 RA RA  20  PA 60/29 (39)  PCWP 29    CO  4.6 l/min  CI  2.3 l/min/m2    - Home  gtt at 5  - home GDMT: entresto 24/26mg BID (on hold 2/2 IVÁN), Hydralazine 25 q8h being continued  - home diuretics: Was taking lasix 40 bid  - Upgrading to IABP today   - Continue VAD/OHTx workup. (Leaning towards LVAD) On step 4  -Blood type A+  -PT/OT and nutrition   -Continue current support with  5, Ketty 10ppm, Lasix 40mg/hr  -2D echo pending     PAF (paroxysmal atrial fibrillation)  Known hx of pAF. In sinus rhythm on admission, but 1 run of AF RVR overnight. He spontaneously converted.  - Continue home amiodarone 400mg qd  - Stop home Eliquis and continue heparin gtt while in the hospital.       IVÁN (acute kidney injury)  IVÁN on CKD2. Baseline Cr of 1.7-2.0.   -Currently 3.0, trending up  - Trend along with CVP, continue diuresis with Lasix gtt @40mg/hr   - Hold ARNi, entresto  -Trend BMPs daily     Type 2 diabetes mellitus without complication, without long-term current use of insulin  -A1c 7.6, not insulin dependent  - MDSSI  -diabetic diet ordered/Boost glucose control   - Endocrine  following, appreciate assistance with blood glucose management    CAD (coronary artery disease)  -S/p 3vCABG in 2009  - continue home ASA, HI statin    Ventricular tachycardia  Hx VT s/p ICD placement (medtronic 2009)  - Continue home amio 400mg qd      .Uninterrupted Critical Care/Counseling Time (not including procedures): 55 mins     Hugh Duran NP  Heart Transplant  [facility] - Cardiac Intensive Care

## 2023-11-21 NOTE — PT/OT/SLP PROGRESS
Occupational Therapy      Patient Name:  Radha Abbott   MRN:  [id number]    Patient not seen today secondary to Off the floor for procedure/surgery: IABP placement. Will follow-up on next available date pending new POC establishment.    11/21/2023

## 2023-11-21 NOTE — ASSESSMENT & PLAN NOTE
Endocrinology consulted for BG management.   BG goal 140-180  Temporarily NPO, if prolonged will adjust orders.     WBD 0.6 units/kg/day (60/40 split)  - Levemir (Insulin Detemir) 18 units nightly   - Novolog (Insulin Aspart) 12 units TIDWM and prn for BG excursions [facility] SSI (150/25)  - BG checks AC/HS  - Hypoglycemia protocol in place    ** Please notify Endocrine for any change and/or advance in diet**  ** Please call Endocrine for any BG related issues **    Discharge Planning:   TBD. Please notify endocrinology prior to discharge.

## 2023-11-21 NOTE — ASSESSMENT & PLAN NOTE
- Management by primary team, Cr increasing since last night    Recent Labs   Lab 11/19/23  1642 11/20/23  0325 11/21/23  0323   CREATININE 2.9* 2.8* 3.0*

## 2023-11-21 NOTE — SUBJECTIVE & OBJECTIVE
Interval HPI:   Overnight events: No acute events overnight. Patient in room [id number] A. Blood glucose stable. BG at and above goal on current insulin regimen (SSI, prandial, and basal insulin ). Steroid use- None. 1 Day Post-Op  Renal function- Abnormal - Creatinine 3.0   Vasopressors-  None       Endocrine will continue to follow and manage insulin orders inpatient.         Diet NPO Except for: Medication  Diet NPO Except for: Medication     Eating:   NPO  Nausea: No  Hypoglycemia and intervention: No  Fever: No  TPN and/or TF: No      /67   Pulse 78   Temp 97.5 °F (36.4 °C) (Oral)   Resp 18   Ht 6' (1.829 m)   Wt 76 kg (167 lb 8.8 oz)   SpO2 100%   BMI 22.72 kg/m²     Labs Reviewed and Include    Recent Labs   Lab 11/21/23  0323   *   CALCIUM 9.2   ALBUMIN 3.0*   PROT 7.0   *   K 4.3   CO2 24   CL 92*   BUN 94*   CREATININE 3.0*   ALKPHOS 82   ALT 33   AST 28   BILITOT 0.5     Lab Results   Component Value Date    WBC 6.33 11/21/2023    HGB 6.5 (L) 11/21/2023    HCT 20.1 (L) 11/21/2023    MCV 90 11/21/2023     11/21/2023     Recent Labs   Lab 11/15/23  1720   TSH 1.381     Lab Results   Component Value Date    HGBA1C 7.6 (H) 10/12/2023       Nutritional status:   Body mass index is 22.72 kg/m².  Lab Results   Component Value Date    ALBUMIN 3.0 (L) 11/21/2023    ALBUMIN 2.9 (L) 11/20/2023    ALBUMIN 3.0 (L) 11/19/2023     Lab Results   Component Value Date    PREALBUMIN 27 11/15/2023       Estimated Creatinine Clearance: 27.8 mL/min (A) (based on SCr of 3 mg/dL (H)).    Accu-Checks  Recent Labs     11/18/23  2243 11/19/23  0743 11/19/23  1208 11/19/23  1211 11/19/23  1645 11/19/23  2109 11/20/23  0854 11/20/23  1150 11/20/23  1639 11/20/23 2109   POCTGLUCOSE 227* 200* 357* 343* 280* 258* 242* 365* 321* 240*       Current Medications and/or Treatments Impacting Glycemic Control  Immunotherapy:    Immunosuppressants       None          Steroids:   Hormones (From admission,  onward)      None          Pressors:    Autonomic Drugs (From admission, onward)      None          Hyperglycemia/Diabetes Medications:   Antihyperglycemics (From admission, onward)      Start     Stop Route Frequency Ordered    11/20/23 2100  insulin detemir U-100 (Levemir) pen 18 Units         -- SubQ Nightly 11/20/23 1648    11/20/23 1747  insulin aspart U-100 pen 0-10 Units         -- SubQ Before meals & nightly PRN 11/20/23 1648    11/19/23 1130  insulin aspart U-100 pen 12 Units         -- SubQ 3 times daily with meals 11/19/23 0818

## 2023-11-21 NOTE — ASSESSMENT & PLAN NOTE
- 62yo M patient with history of CAD s/p 3v CABG (unclear anatomy, 2009), HFrEF ICM (EF 15-20%) s/p ICD (medtronic 2009), T2DM (Hba1c 7.7), HTN, HLD, Vfib (Amio), CKD (Cr 2.1), [place] [facility] [place]  - Patient worsening and RHC done today showed elevated filling pressures, not responding to diuretic therapy, and decreased CO/CI  - Interventional cardiology consulted for IABP placement    --IABP placement  - Access: right common femoral artery  - Creatinine/CrCl: 2.8  - Allergies: No shellfish / Iodine allergy  - Pre-Hydration: NS  - Pre-Op Med: Bendaryl 50mg pO   - All patient's questions were answered.    -The risks of IABP include but are not limited to: bleeding, infection, allergic reactions, kidney injury and potential need for dialysis, stroke, limb ischemia and death.   -The risks of moderate sedation include hypotension, respiratory depression, arrhythmias, bronchospasm, and death.   - Informed consent was obtained and the  patient is agreeable to proceed with the procedure.

## 2023-11-21 NOTE — PLAN OF CARE
Cardiac ICU Care Plan    POC reviewed with Radha Abbott and family. Questions and concerns addressed. Leivasy placed today, CVP 19 on new line. Plan NPO @0001 for IABP tomorrow. Lasix gtt increased, 1 dose diuril given today. See below and flowsheets for full assessment and VS info.       Neuro:  [place] Coma Scale  Best Eye Response: 4-->(E4) spontaneous  Best Motor Response: 6-->(M6) obeys commands  Best Verbal Response: 5-->(V5) oriented  Michelle Coma Scale Score: 15  Assessment Qualifiers: patient not sedated/intubated  Pupil PERRLA: yes    24 hr Temp:  [97.6 °F (36.4 °C)-98.3 °F (36.8 °C)]      CV:  Rhythm: normal sinus rhythm   DVT prophylaxis: VTE Required Core Measure: Pharmacological prophylaxis initiated/maintained    CVP (mean): 19 mmHg (11/20/23 1801)    Pulmonary Artery Catheter Assessment  11/20/23 1330 Internal Jugular Left-Current Insertion Depth (cm): 53 cm (11/20/23 1501)  PAP: 58/26 (11/20/23 1801)  SVO2 (%): (S) 63 % (11/19/23 2023)               Pulses  Right Radial Pulse: 2+ (normal)  Left Radial Pulse: 2+ (normal)  Right Dorsalis Pedis Pulse: 1+ (weak)  Left Dorsalis Pedis Pulse: 1+ (weak)  Right Posterior Tibial Pulse: 1+ (weak)  Left Posterior Tibial Pulse: 1+ (weak)    Resp:  Flow (L/min): 4  Oxygen Concentration (%): 36    GI/:  GI prophylaxis: yes  Diet/Nutrition Received: consistent carb/diabetic diet  Last Bowel Movement: 11/20/23  Voiding Characteristics: voids spontaneously without difficulty   Intake/Output Summary (Last 24 hours) at 11/20/2023 1849  Last data filed at 11/20/2023 1701  Gross per 24 hour   Intake 1457.34 ml   Output 1080 ml   Net 377.34 ml        Nutritional Supplement Intake: Quantity 1, Type: Boost    Labs/Accuchecks:  Recent Labs   Lab 11/19/23  0336 11/19/23  1642 11/20/23  0325 11/20/23  0855   WBC 6.23 6.12 5.57  --    RBC 2.43* 2.43* 2.30*  --    HGB 7.0* 7.1* 6.4*  --    HCT 21.4* 21.8* 20.7* 21*    311 267  --       Recent Labs   Lab 11/15/23  1603  "11/16/23  0436 11/18/23  0349 11/19/23  0336 11/20/23  0325   INR 1.1  --   --   --   --    APTT  --    < > 56.1* 56.7* 54.1*    < > = values in this interval not displayed.      Recent Labs     11/20/23  0325   *   K 4.2   CO2 24   CL 94*   BUN 88*   CREATININE 2.8*   ALKPHOS 77   ALT 21   AST 20   BILITOT 0.5     No results for input(s): "CPK", "CPKMB", "MB", "TROPONINI" in the last 168 hours.   Recent Labs     11/19/23  0748 11/19/23 2021 11/20/23  0855   PH 7.410 7.375 7.375   PCO2 40.5 41.9 42.4   PO2 30* 33* 30*   HCO3 25.7 24.5 24.8   POCSATURATED 57 63 57   BE 1 -1 0       Electrolytes: N/A - electrolytes WDL  Accuchecks: ACHS    Gtts/LDAs:   sodium chloride 0.9%      sodium chloride 0.9% 10 mL/hr at 11/20/23 1701    DOBUTamine 5 mcg/kg/min (11/20/23 1701)    furosemide (LASIX) 500 mg in 50 mL infusion (conc: 10 mg/mL) 40 mg/hr (11/20/23 1701)    heparin (porcine) in D5W 16 Units/kg/hr (11/20/23 1701)    nitric oxide gas      sodium chloride 0.9% 250 mL (11/20/23 1242)       Lines/Drains/Airways       Central Venous Catheter Line  Duration              Introducer Single Lumen 11/20/23 1330 Internal Jugular Left <1 day    Pulmonary Artery Catheter Assessment  11/20/23 1330 Internal Jugular Left <1 day              Peripheral Intravenous Line  Duration                  Midline Catheter Insertion/Assessment  - Single Lumen 11/14/23 1113 Left brachial vein 20g x 10cm 6 days                    Skin/Wounds  Bathing/Skin Care: bath, complete;dressed/undressed;linen changed (11/20/23 0801)  Wounds: No  Wound care consulted: No  "

## 2023-11-21 NOTE — BRIEF OP NOTE
Post Cath Note  Preoperative Diagnosis: Decompensated heart failure [I50.9]  Acute on chronic combined systolic and diastolic CHF, NYHA class 4 [I50.43]  HFrEF (heart failure with reduced ejection fraction) [I50.20]   Postop Diagnosis: Decompensated heart failure [I50.9]  Acute on chronic combined systolic and diastolic CHF, NYHA class 4 [I50.43]  HFrEF (heart failure with reduced ejection fraction) [I50.20]  Referring Physician: Self,Aaareferral     Procedure: INSERTION, INTRA-AORTIC BALLOON PUMP (Right)       Access: Right CFA  : Finn Cohn MD   All Operators: Surgeon(s):  Ric Lara MD Porudominsky Rotstain, Ruben, MD Tafur Soto, Jose D., MD     See full report for further details    Intervention:   - S/P IABP    Treatments/Procedures: Procedure(s) (LRB):  INSERTION, INTRA-AORTIC BALLOON PUMP (Right)     Estimated Blood loss: 20 cc    Specimens removed: No  Post Cath Exam:     Vitals:    11/21/23 0901   BP: 115/62   Pulse: 79   Resp: 15   Temp:      No unusual pain, hematoma, thrill or bruit at vascular access site.  Distal pulse present without signs of ischemia.    Recommendations:   - Patient tolerated procedure well. No immediate complications  - Routine post-cath care  - BMP tomorrow morning   - EKG when arrives to floor  - Routine post cath protocol  - Maximize medical management  - Further care by the primary team    Clif Olmedo  11/21/2023  10:59 AM  Cardiovascular Fellow  [facility]

## 2023-11-21 NOTE — ASSESSMENT & PLAN NOTE
Lab Results   Component Value Date    CREATININE 3.0 (H) 11/21/2023     Avoid insulin stacking  Titrate insulin slowly

## 2023-11-21 NOTE — PT/OT/SLP PROGRESS
Physical Therapy      Patient Name:  Radha Abbott   MRN:  [id number]    Patient not seen today secondary to  (pt not seen due to going to get femoral balloon pump placed. Pt will be on bedrest and PT and OT will decide which will follow for treatment.). Will follow-up at a later date.    11/21/2023  .

## 2023-11-21 NOTE — COMMITTEE REVIEW
Native Organ Dx: Ischemic Cardiomyopathy     Unable to determine transplant candidacy at this time due to recent smoking history. After being presented at today's selection committee meeting, pt is deemed not a candidate for heart transplant. Also, he is being deferred for LVAD at this time to complete evaluation.     Note was written by Rohini Conte.    ==========================================================    I agree and attest to the decision of the committee.

## 2023-11-21 NOTE — PROGRESS NOTES
[facility] - Cardiac Intensive Care  Endocrinology  Progress Note    Admit Date: 11/9/2023     Reason for Consult: Management of T2DM, Hyperglycemia     Surgical Procedure and Date: n/a    Diabetes diagnosis year: 1998    Home Diabetes Medications:  Metformin (off since October)   Lab Results   Component Value Date    HGBA1C 7.6 (H) 10/12/2023       How often checking glucose at home?  Once daily in the AM    BG readings on regimen: 150-160s  Hypoglycemia on the regimen?  No  Missed doses on regimen?  n/a    Diabetes Complications include:     Hyperglycemia    Complicating diabetes co morbidities:   CAD s/p CABG, HTN, HLD      HPI:   Patient is a 63 y.o. male with a diagnosis of CAD s/p 3v CABG (unclear anatomy, 2009), ICM with a recent EF of 15-20% s/p ICD (medtronik 2009), DM2 (a1c 7.7), HTN, HLD, Vfib on amio who presents to the ED with CC of SOB. In the ED he was AF with stable VS on RA.  CBC showing chronic anemia.  CMP notable for acute on chronic CKD with baseline around 2.1 and Cr now 2.6.  BNP elevated.  Lactate neg.  CXR showing pulmonary edema. He was subsequently admitted to the CCU for diuresis.  Endocrinology consulted for management of T2DM.          Interval HPI:   Overnight events: No acute events overnight. Patient in room [id number]/OQMG1405 A. Blood glucose stable. BG at and above goal on current insulin regimen (SSI, prandial, and basal insulin ). Steroid use- None. 1 Day Post-Op  Renal function- Abnormal - Creatinine 3.0   Vasopressors-  None       Endocrine will continue to follow and manage insulin orders inpatient.         Diet NPO Except for: Medication  Diet NPO Except for: Medication     Eating:   NPO  Nausea: No  Hypoglycemia and intervention: No  Fever: No  TPN and/or TF: No      /67   Pulse 78   Temp 97.5 °F (36.4 °C) (Oral)   Resp 18   Ht 6' (1.829 m)   Wt 76 kg (167 lb 8.8 oz)   SpO2 100%   BMI 22.72 kg/m²     Labs Reviewed and Include    Recent Labs   Lab 11/21/23  3427    *   CALCIUM 9.2   ALBUMIN 3.0*   PROT 7.0   *   K 4.3   CO2 24   CL 92*   BUN 94*   CREATININE 3.0*   ALKPHOS 82   ALT 33   AST 28   BILITOT 0.5     Lab Results   Component Value Date    WBC 6.33 11/21/2023    HGB 6.5 (L) 11/21/2023    HCT 20.1 (L) 11/21/2023    MCV 90 11/21/2023     11/21/2023     Recent Labs   Lab 11/15/23  1720   TSH 1.381     Lab Results   Component Value Date    HGBA1C 7.6 (H) 10/12/2023       Nutritional status:   Body mass index is 22.72 kg/m².  Lab Results   Component Value Date    ALBUMIN 3.0 (L) 11/21/2023    ALBUMIN 2.9 (L) 11/20/2023    ALBUMIN 3.0 (L) 11/19/2023     Lab Results   Component Value Date    PREALBUMIN 27 11/15/2023       Estimated Creatinine Clearance: 27.8 mL/min (A) (based on SCr of 3 mg/dL (H)).    Accu-Checks  Recent Labs     11/18/23  2243 11/19/23  0743 11/19/23  1208 11/19/23  1211 11/19/23  1645 11/19/23  2109 11/20/23  0854 11/20/23  1150 11/20/23  1639 11/20/23  2109   POCTGLUCOSE 227* 200* 357* 343* 280* 258* 242* 365* 321* 240*       Current Medications and/or Treatments Impacting Glycemic Control  Immunotherapy:    Immunosuppressants       None          Steroids:   Hormones (From admission, onward)      None          Pressors:    Autonomic Drugs (From admission, onward)      None          Hyperglycemia/Diabetes Medications:   Antihyperglycemics (From admission, onward)      Start     Stop Route Frequency Ordered    11/20/23 2100  insulin detemir U-100 (Levemir) pen 18 Units         -- SubQ Nightly 11/20/23 1648    11/20/23 1747  insulin aspart U-100 pen 0-10 Units         -- SubQ Before meals & nightly PRN 11/20/23 1648    11/19/23 1130  insulin aspart U-100 pen 12 Units         -- SubQ 3 times daily with meals 11/19/23 0818            ASSESSMENT and PLAN    Cardiac/Vascular  * Acute on chronic combined systolic and diastolic CHF, NYHA class 4  Managed per primary team  Optimize BG control      PAF (paroxysmal atrial fibrillation)  May  increase insulin resistance.         Renal/  IVÁN (acute kidney injury)  Lab Results   Component Value Date    CREATININE 3.0 (H) 11/21/2023     Avoid insulin stacking  Titrate insulin slowly       Endocrine  Type 2 diabetes mellitus without complication, without long-term current use of insulin  Endocrinology consulted for BG management.   BG goal 140-180  Temporarily NPO, if prolonged will adjust orders.     WBD 0.6 units/kg/day (60/40 split)  - Levemir (Insulin Detemir) 18 units nightly   - Novolog (Insulin Aspart) 12 units TIDWM and prn for BG excursions [facility] SSI (150/25)  - BG checks AC/HS  - Hypoglycemia protocol in place    ** Please notify Endocrine for any change and/or advance in diet**  ** Please call Endocrine for any BG related issues **    Discharge Planning:   TBD. Please notify endocrinology prior to discharge.                 Erasmo Parrish, DNP, FNP  Endocrinology  [facility] - Cardiac Intensive Care

## 2023-11-21 NOTE — PLAN OF CARE
Brief Overnight Plan of Care    Updates:  Poor UOP today after lasix bumped up from 20 to 40/hr around 2PM and 250 mg IV diuril around 3PM  Last hgb this morning 6.4, no transfusion was given    RHC earlier today:  RA 20, PCWP 29, PA Sat 34%, CI 2.3 on  5      Infusions:   sodium chloride 0.9%      sodium chloride 0.9% 10 mL/hr at 11/20/23 2002    DOBUTamine 5 mcg/kg/min (11/20/23 2002)    furosemide (LASIX) 500 mg in 50 mL infusion (conc: 10 mg/mL) 40 mg/hr (11/20/23 2002)    heparin (porcine) in D5W 16 Units/kg/hr (11/20/23 2002)    nitric oxide gas      sodium chloride 0.9% 250 mL/hr at 11/20/23 2002       7PM Hemodynamics:  Parameters    CVP 15   SVO2 40 (repeat 31)   CO (calculated with 31) 4.5   CI 2.3   SVR 1600       I/O this shift:  In: 346.9 [I.V.:96.9; IV Piggyback:250]  Out: -       Intake/Output Summary (Last 24 hours) at 11/20/2023 2114  Last data filed at 11/20/2023 2002  Gross per 24 hour   Intake 1622.27 ml   Output 980 ml   Net 642.27 ml          Plan:  - Lasix 120mg IV x1, Diuril 500mg IV x1  - Stat lactate  - Repeat hemos frequently  - If no response to increasing diuresis, developing shock, will activate shock call  - Otherwise, IABP planned for AM      Discussed with staff    Connor Gillies, DO  PGY-IV, Cardiology

## 2023-11-21 NOTE — PROGRESS NOTES
[facility] - Cardiac Intensive Care  Interventional Cardiology  Progress Note    Patient Name: Rdaha Abbott  MRN: [id number]  Admission Date: 11/9/2023  Hospital Length of Stay: 12 days  Code Status: Full Code   Attending Physician: Sandhya Price MD   Primary Care Physician: Vasu Kong MD  Principal Problem:Acute on chronic combined systolic and diastolic CHF, NYHA class 4    Subjective:     Past Medical History:   Diagnosis Date    CAD (coronary artery disease)     Diabetes mellitus     HFrEF (heart failure with reduced ejection fraction)     ICD (implantable cardioverter-defibrillator) in place     MI, old        Past Surgical History:   Procedure Laterality Date    CARDIAC SURGERY      RIGHT HEART CATHETERIZATION Right 10/10/2023    Procedure: INSERTION, CATHETER, RIGHT HEART;  Surgeon: Bin Gandhi MD;  Location: [facility] CATH LAB;  Service: Cardiology;  Laterality: Right;    RIGHT HEART CATHETERIZATION Right 10/13/2023    Procedure: INSERTION, CATHETER, RIGHT HEART;  Surgeon: Walter Mcintyre MD;  Location: [facility] CATH LAB;  Service: Cardiology;  Laterality: Right;    RIGHT HEART CATHETERIZATION  11/13/2023    RIGHT HEART CATHETERIZATION Right 11/13/2023    Procedure: INSERTION, CATHETER, RIGHT HEART;  Surgeon: Juventino Bermudez Jr., MD;  Location: [facility] CATH LAB;  Service: Cardiology;  Laterality: Right;       Review of patient's allergies indicates:  No Known Allergies    PTA Medications   Medication Sig    amiodarone (PACERONE) 400 MG tablet Take 1 tablet (400 mg total) by mouth once daily.    apixaban (ELIQUIS) 5 mg Tab Take 1 tablet (5 mg total) by mouth 2 (two) times daily.    aspirin (ECOTRIN) 81 MG EC tablet Take 81 mg by mouth once daily.    atorvastatin (LIPITOR) 40 MG tablet Take 40 mg by mouth.    DOBUTamine (DOBUTREX) 1,000 mg/250 mL (4,000 mcg/mL) infusion Inject 389.5 mcg/min into the vein continuous.    fish oil-omega-3 fatty acids 300-1,000 mg capsule Take 2 g by mouth once daily.     furosemide (LASIX) 40 MG tablet Take 1 tablet (40 mg total) by mouth 2 (two) times daily.    gabapentin (NEURONTIN) 300 MG capsule Take 300 mg by mouth nightly as needed.    multivitamin capsule Take 1 capsule by mouth once daily.    pantoprazole (PROTONIX) 40 MG tablet Take 1 tablet (40 mg total) by mouth before breakfast.    potassium chloride SA (K-DUR,KLOR-CON) 20 MEQ tablet Take 1 tablet (20 mEq total) by mouth once daily.    sacubitriL-valsartan (ENTRESTO) 24-26 mg per tablet Take 1 tablet by mouth 2 (two) times daily.    metOLazone (ZAROXOLYN) 2.5 MG tablet Take 1 tablet (2.5 mg total) by mouth once daily. Thursday 11/9 and Saturday 11/10    torsemide (DEMADEX) 20 MG Tab Take 2 tablets (40 mg total) by mouth 2 (two) times a day.     Family History    None       Tobacco Use    Smoking status: Every Day     Current packs/day: 0.50     Types: Cigarettes    Smokeless tobacco: Not on file   Substance and Sexual Activity    Alcohol use: Yes     Comment: rarely    Drug use: No    Sexual activity: Not on file     Review of Systems   Constitutional: Negative.   HENT: Negative.     Cardiovascular:  Negative for chest pain, claudication, cyanosis, dyspnea on exertion, irregular heartbeat, leg swelling, near-syncope, orthopnea, palpitations and paroxysmal nocturnal dyspnea.   Respiratory: Negative.     Endocrine: Negative.    Musculoskeletal: Negative.    Gastrointestinal: Negative.    Genitourinary: Negative.    Neurological: Negative.      Objective:     Vital Signs (Most Recent):  Temp: 97.5 °F (36.4 °C) (11/21/23 0302)  Pulse: 75 (11/21/23 0502)  Resp: 16 (11/21/23 0502)  BP: 94/62 (11/21/23 0634)  SpO2: 100 % (11/21/23 0502) Vital Signs (24h Range):  Temp:  [97.4 °F (36.3 °C)-98.2 °F (36.8 °C)] 97.5 °F (36.4 °C)  Pulse:  [72-88] 75  Resp:  [10-26] 16  SpO2:  [99 %-100 %] 100 %  BP: ()/(55-72) 94/62     Weight: 76 kg (167 lb 8.8 oz)  Body mass index is 22.72 kg/m².    SpO2: 100 %         Intake/Output Summary  (Last 24 hours) at 11/21/2023 0642  Last data filed at 11/21/2023 0502  Gross per 24 hour   Intake 1674.51 ml   Output 1055 ml   Net 619.51 ml         Lines/Drains/Airways       Central Venous Catheter Line  Duration              Introducer Single Lumen 11/20/23 1330 Internal Jugular Left <1 day    Pulmonary Artery Catheter Assessment  11/20/23 1330 Internal Jugular Left <1 day              Drain  Duration                  Urine Pouch  11/20/23 2202 other (see comments) <1 day              Peripheral Intravenous Line  Duration                  Midline Catheter Insertion/Assessment  - Single Lumen 11/14/23 1113 Left brachial vein 20g x 10cm 6 days                     Physical Exam  Vitals and nursing note reviewed.   Constitutional:       General: He is not in acute distress.     Appearance: Normal appearance. He is normal weight. He is not ill-appearing or diaphoretic.   HENT:      Head: Normocephalic and atraumatic.   Eyes:      Pupils: Pupils are equal, round, and reactive to light.   Cardiovascular:      Rate and Rhythm: Normal rate and regular rhythm.      Pulses:           Radial pulses are 2+ on the right side and 2+ on the left side.        Femoral pulses are 3+ on the right side and 3+ on the left side.       Popliteal pulses are 2+ on the right side and 2+ on the left side.        Dorsalis pedis pulses are 2+ on the right side and 2+ on the left side.        Posterior tibial pulses are 1+ on the right side and 1+ on the left side.      Heart sounds: Normal heart sounds.      Comments: Posterior tibiales pulses heard with ultrasound, rest palpable  Pulmonary:      Effort: Pulmonary effort is normal.      Breath sounds: Normal breath sounds.   Abdominal:      General: Abdomen is flat. Bowel sounds are normal. There is no distension.      Palpations: Abdomen is soft. There is no mass.      Tenderness: There is no abdominal tenderness.   Musculoskeletal:         General: Normal range of motion.   Skin:      General: Skin is warm.      Capillary Refill: Capillary refill takes less than 2 seconds.   Neurological:      General: No focal deficit present.      Mental Status: He is alert and oriented to person, place, and time.            Significant Labs:     Recent Labs   Lab 11/19/23  1642 11/20/23  0325 11/20/23  0855 11/21/23  0323   WBC 6.12 5.57  --  6.33   HGB 7.1* 6.4*  --  6.5*   HCT 21.8* 20.7* 21* 20.1*    267  --  328         Recent Labs   Lab 11/19/23  0336 11/19/23  1642 11/20/23  0325 11/21/23  0323    131* 132* 130*   K 3.8 4.2 4.2 4.3   CL 97 94* 94* 92*   CO2 26 23 24 24   BUN 87* 85* 88* 94*   CREATININE 3.1* 2.9* 2.8* 3.0*   CALCIUM 9.0 8.9 8.9 9.2   PHOS 4.4  --  3.8 5.3*         Recent Labs   Lab 11/15/23  1720 11/18/23  1236 11/19/23  1642 11/20/23  0325 11/21/23  0323   ALKPHOS  --    < > 83 77 82   BILITOT  --    < > 0.4 0.5 0.5   BILIDIR 0.4*  --   --   --   --    PROT  --    < > 7.2 6.5 7.0   ALT  --    < > 17 21 33   AST  --    < > 12 20 28    < > = values in this interval not displayed.         Recent Labs   Lab 11/15/23  1720   CHOL 107*   TRIG 74   HDL 49       Significant Imaging:     DIMITRIS 11/20/23    Right resting DIMITRIS 0.75, suggestive of moderate right lower extremity arterial disease.    Left resting DIMITRIS 0.76, is suggestive moderate left lower extremity arterial disease.    PVR waveforms are mildly dampened at the calf level on the right, and moderately to severely dampened at the ankle level bilaterally.    RHC 11/20/23    The estimated blood loss was none.    The filling pressures on the right and left were severely elevated. Pulmonary hypertension was moderate to severe.    RA  20  PA 60/29 (39)  PCWP 29    CO  4.6 l/min  CI  2.3 l/min/m2    PVR 2.2 DRABY.    Condition: on DObutamine 5 mcg/kg/min.    RHC 11/13/23  Study performed on  5 mcg/kg/min:     Right atrial pressure is moderately elevated.     Pulmonary artery pressure is moderately elevated.     Pulmonary wedge  pressure is moderately severely elevated.     Pulmonary vascular resistance is mildly elevated 3.7 Wood units.     Systemic vascular resistance is 1327.     Reduced cardiac power output 0.38.     Normal CHINYERE 2.50.     RA: 19/ 16/ 14 RV: 65/ 20 PA: 70/ 35/ 47 PWP: 32/ 45/ 32 .   Cardiac output was 4.1 by Ana. Cardiac index is 2.1 L/min/m2.   O2 Sat: PA 32%.   Pulmonary vascular resistance: 293. Systemic vascular resistance: 1327.      TTE 10/19/23     Left Ventricle: The left ventricle is severely dilated. Moderately increased ventricular mass. Normal wall thickness. There is moderate eccentric hypertrophy. regional wall motion abnormalities present. There is severely reduced systolic function with a visually estimated ejection fraction of 15 - 20%. Biplane (2D) method of discs ejection fraction is 18%. There is diastolic dysfunction.    Left Atrium: Left atrium is severely dilated.    Right Ventricle: Normal right ventricular cavity size. Systolic function is normal.    Aortic Valve: There is mild aortic valve sclerosis.    Mitral Valve: There is mild to moderate regurgitation.    IVC/SVC: Intermediate venous pressure at 8 mmHg.          Assessment and Plan:     Patient is a 63 y.o. male presenting with:    Cardiac/Vascular  * Acute on chronic combined systolic and diastolic CHF, NYHA class 4  - 64yo M patient with history of CAD s/p 3v CABG (unclear anatomy, 2009), HFrEF ICM (EF 15-20%) s/p ICD (medtronic 2009), T2DM (Hba1c 7.7), HTN, HLD, Vfib (Amio), CKD (Cr 2.1), admitted to [facility] on 11/09/23  - Patient worsening and RHC done today showed elevated filling pressures, not responding to diuretic therapy, and decreased CO/CI  - Interventional cardiology consulted for IABP placement    --IABP placement  - Access: right common femoral artery  - Creatinine/CrCl: 2.8  - Allergies: No shellfish / Iodine allergy  - Pre-Hydration: NS  - Pre-Op Med: Bendaryl 50mg pO   - All patient's questions were answered.    -The risks of  IABP include but are not limited to: bleeding, infection, allergic reactions, kidney injury and potential need for dialysis, stroke, limb ischemia and death.   -The risks of moderate sedation include hypotension, respiratory depression, arrhythmias, bronchospasm, and death.   - Informed consent was obtained and the  patient is agreeable to proceed with the procedure.    Renal/  IVÁN (acute kidney injury)  - Management by primary team, Cr increasing since last night    Recent Labs   Lab 11/19/23  1642 11/20/23  0325 11/21/23  0323   CREATININE 2.9* 2.8* 3.0*         Endocrine  Type 2 diabetes mellitus without complication, without long-term current use of insulin  - Continue management by primary team        VTE Risk Mitigation (From admission, onward)           Ordered     heparin 25,000 units in dextrose 5% (100 units/ml) IV bolus from bag - ADDITIONAL PRN BOLUS - 60 units/kg  As needed (PRN)        Question:  Heparin Infusion Adjustment (DO NOT MODIFY ANSWER)  Answer:  \\[email or web address]\epic\Images\Pharmacy\HeparinInfusions\heparin LOW INTENSITY nomogram for [facility] [id number]    11/11/23 0741     heparin 25,000 units in dextrose 5% (100 units/ml) IV bolus from bag - ADDITIONAL PRN BOLUS - 30 units/kg  As needed (PRN)        Question:  Heparin Infusion Adjustment (DO NOT MODIFY ANSWER)  Answer:  \\[email or web address]\epic\Images\Pharmacy\HeparinInfusions\heparin LOW INTENSITY nomogram for [facility] [id number]    11/11/23 0741     heparin 25,000 units in dextrose 5% 250 mL (100 units/mL) infusion LOW INTENSITY nomogram - [facility]  Continuous        Question:  Begin at (units/kg/hr)  Answer:  12    11/11/23 0741                    Clif Olmedo MD  Interventional Cardiology  [facility] - Cardiac Intensive [facility]

## 2023-11-21 NOTE — PLAN OF CARE
CICU Care Plan    POC reviewed with Radha Abbott and family at 1902. Pt verbalized understanding. Questions and concerns addressed. No acute events noted this shift. HTS Fellow notified of SvO2 of 41 at beginning of shift. SvO2 orders changed to Q4, lactate, lasix, and diuril ordered. Please see I/O for UOP. Plans for IABP placement today. NPO since midnight except meds. Pt progressing toward goals. Security measures in place, plan of care to continue. See below and flowsheets for full assessment and VS info.     CVP: 15, 11, 16  SvO2: 40, 31, 34, 44  Lactate: 1.3, 0.6    Gtts:    Lasix @ 40mg/hr  Dobutamine @ 5mcg/kg/min  Heparin @ 16units/kg/min - aptt 51.0    Neuro:  Michelle Coma Scale  Best Eye Response: 4-->(E4) spontaneous  Best Motor Response: 6-->(M6) obeys commands  Best Verbal Response: 5-->(V5) oriented  [place] Coma Scale Score: 15  Assessment Qualifiers: patient not sedated/intubated, no eye obstruction present  Pupil PERRLA: yes     24 hr Temp:  [97.4 °F (36.3 °C)-98.2 °F (36.8 °C)]     CV:   Rhythm: normal sinus rhythm  BP goals:   MAP > 65    Resp:      Oxygen Concentration (%): 36    Plan: N/A    GI/:     Diet/Nutrition Received: consistent carb/diabetic diet  Last Bowel Movement: 11/20/23  Voiding Characteristics: voids spontaneously without difficulty    Intake/Output Summary (Last 24 hours) at 11/21/2023 0647  Last data filed at 11/21/2023 0502  Gross per 24 hour   Intake 1674.51 ml   Output 1055 ml   Net 619.51 ml     Unmeasured Output  Urine Occurrence: 1  Stool Occurrence: 1    Labs/Accuchecks:  Recent Labs   Lab 11/21/23 0323   WBC 6.33   RBC 2.24*   HGB 6.5*   HCT 20.1*         Recent Labs   Lab 11/21/23 0323   *   K 4.3   CO2 24   CL 92*   BUN 94*   CREATININE 3.0*   ALKPHOS 82   ALT 33   AST 28   BILITOT 0.5      Recent Labs   Lab 11/15/23  1601 11/16/23  0436 11/21/23 0323   INR 1.1  --   --    APTT  --    < > 51.0*    < > = values in this interval not displayed.    No  "results for input(s): "CPK", "CPKMB", "TROPONINI", "MB" in the last 168 hours.    Electrolytes: Electrolytes WDL  Accuchecks: ACHS    Gtts:   sodium chloride 0.9%      sodium chloride 0.9% 10 mL/hr at 11/21/23 0502    DOBUTamine 5 mcg/kg/min (11/21/23 0502)    furosemide (LASIX) 500 mg in 50 mL infusion (conc: 10 mg/mL) 40 mg/hr (11/21/23 0502)    heparin (porcine) in D5W 16 Units/kg/hr (11/21/23 0502)    nitric oxide gas      sodium chloride 0.9% 250 mL/hr at 11/20/23 2002       LDA/Wounds:  Lines/Drains/Airways       Central Venous Catheter Line  Duration              Introducer Single Lumen 11/20/23 1330 Internal Jugular Left <1 day    Pulmonary Artery Catheter Assessment  11/20/23 1330 Internal Jugular Left <1 day              Drain  Duration                  Urine Pouch  11/20/23 2202 other (see comments) <1 day              Peripheral Intravenous Line  Duration                  Midline Catheter Insertion/Assessment  - Single Lumen 11/14/23 1113 Left brachial vein 20g x 10cm 6 days                  Wounds: No  Wound care consulted: No    Problem: Adult Inpatient Plan of Care  Goal: Plan of Care Review  Outcome: Ongoing, Progressing  Goal: Patient-Specific Goal (Individualized)  Outcome: Ongoing, Progressing  Goal: Absence of Hospital-Acquired Illness or Injury  Outcome: Ongoing, Progressing  Goal: Optimal Comfort and Wellbeing  Outcome: Ongoing, Progressing  Goal: Readiness for Transition of Care  Outcome: Ongoing, Progressing     Problem: Diabetes Comorbidity  Goal: Blood Glucose Level Within Targeted Range  Outcome: Ongoing, Progressing     Problem: Fluid and Electrolyte Imbalance (Acute Kidney Injury/Impairment)  Goal: Fluid and Electrolyte Balance  Outcome: Ongoing, Progressing     Problem: Oral Intake Inadequate (Acute Kidney Injury/Impairment)  Goal: Optimal Nutrition Intake  Outcome: Ongoing, Progressing     Problem: Renal Function Impairment (Acute Kidney Injury/Impairment)  Goal: Effective Renal " Function  Outcome: Ongoing, Progressing     Problem: Infection  Goal: Absence of Infection Signs and Symptoms  Outcome: Ongoing, Progressing     Problem: Adjustment to Illness (Heart Failure)  Goal: Optimal Coping  Outcome: Ongoing, Progressing     Problem: Cardiac Output Decreased (Heart Failure)  Goal: Optimal Cardiac Output  Outcome: Ongoing, Progressing     Problem: Dysrhythmia (Heart Failure)  Goal: Stable Heart Rate and Rhythm  Outcome: Ongoing, Progressing     Problem: Fluid Imbalance (Heart Failure)  Goal: Fluid Balance  Outcome: Ongoing, Progressing     Problem: Functional Ability Impaired (Heart Failure)  Goal: Optimal Functional Ability  Outcome: Ongoing, Progressing     Problem: Oral Intake Inadequate (Heart Failure)  Goal: Optimal Nutrition Intake  Outcome: Ongoing, Progressing     Problem: Respiratory Compromise (Heart Failure)  Goal: Effective Oxygenation and Ventilation  Outcome: Ongoing, Progressing     Problem: Sleep Disordered Breathing (Heart Failure)  Goal: Effective Breathing Pattern During Sleep  Outcome: Ongoing, Progressing     Problem: Cardiac Output Decreased  Goal: Effective Cardiac Output  Outcome: Ongoing, Progressing     Problem: Fall Injury Risk  Goal: Absence of Fall and Fall-Related Injury  Outcome: Ongoing, Progressing     Problem: Coping Ineffective  Goal: Effective Coping  Outcome: Ongoing, Progressing     Problem: Skin Injury Risk Increased  Goal: Skin Health and Integrity  Outcome: Ongoing, Progressing

## 2023-11-21 NOTE — PROGRESS NOTES
Interdisciplinary Rounds Report:   Attended interdisciplinary rounds with the hospitals/CTS services including the LVAD Coordinators, social workers, cardiologists, surgeons,  PT/OT/Speech, dietician, and unit charge nurses. Discussed patient status, plan of care, goals of care, including DC date, and post discharge needs. Plan of care will be discussed with the patient and/or family per the physician while rounding on the floor. This is a recurring meeting that is medically and socially necessary to collaborate with the interdisciplinary team to assist patient needs and safe discharge.

## 2023-11-22 LAB
ALBUMIN SERPL BCP-MCNC: 3.2 G/DL (ref 3.5–5.2)
ALLENS TEST: ABNORMAL
ALP SERPL-CCNC: 86 U/L (ref 55–135)
ALT SERPL W/O P-5'-P-CCNC: 31 U/L (ref 10–44)
ANION GAP SERPL CALC-SCNC: 12 MMOL/L (ref 8–16)
APTT PPP: 42.3 SEC (ref 21–32)
AST SERPL-CCNC: 24 U/L (ref 10–40)
BASOPHILS # BLD AUTO: 0.05 K/UL (ref 0–0.2)
BASOPHILS # BLD AUTO: 0.05 K/UL (ref 0–0.2)
BASOPHILS NFR BLD: 0.6 % (ref 0–1.9)
BASOPHILS NFR BLD: 0.6 % (ref 0–1.9)
BILIRUB SERPL-MCNC: 0.6 MG/DL (ref 0.1–1)
BIPAP: 0
BUN SERPL-MCNC: 87 MG/DL (ref 8–23)
CALCIUM SERPL-MCNC: 9.3 MG/DL (ref 8.7–10.5)
CHLORIDE SERPL-SCNC: 92 MMOL/L (ref 95–110)
CO2 SERPL-SCNC: 29 MMOL/L (ref 23–29)
CREAT SERPL-MCNC: 2.9 MG/DL (ref 0.5–1.4)
DELSYS: ABNORMAL
DIFFERENTIAL METHOD BLD: ABNORMAL
DIFFERENTIAL METHOD BLD: ABNORMAL
EOSINOPHIL # BLD AUTO: 0.1 K/UL (ref 0–0.5)
EOSINOPHIL # BLD AUTO: 0.2 K/UL (ref 0–0.5)
EOSINOPHIL NFR BLD: 1.6 % (ref 0–8)
EOSINOPHIL NFR BLD: 2 % (ref 0–8)
ERYTHROCYTE [DISTWIDTH] IN BLOOD BY AUTOMATED COUNT: 14.8 % (ref 11.5–14.5)
ERYTHROCYTE [DISTWIDTH] IN BLOOD BY AUTOMATED COUNT: 14.8 % (ref 11.5–14.5)
EST. GFR  (NO RACE VARIABLE): 23.9 ML/MIN/1.73 M^2
FIO2: 40 %
FLOW: 4
GLUCOSE SERPL-MCNC: 129 MG/DL (ref 70–110)
HCO3 UR-SCNC: 27.5 MMOL/L (ref 24–28)
HCO3 UR-SCNC: 27.7 MMOL/L (ref 24–28)
HCO3 UR-SCNC: 28.4 MMOL/L (ref 24–28)
HCT VFR BLD AUTO: 20.9 % (ref 40–54)
HCT VFR BLD AUTO: 23.7 % (ref 40–54)
HGB BLD-MCNC: 6.8 G/DL (ref 14–18)
HGB BLD-MCNC: 7.7 G/DL (ref 14–18)
IMM GRANULOCYTES # BLD AUTO: 0.05 K/UL (ref 0–0.04)
IMM GRANULOCYTES # BLD AUTO: 0.05 K/UL (ref 0–0.04)
IMM GRANULOCYTES NFR BLD AUTO: 0.6 % (ref 0–0.5)
IMM GRANULOCYTES NFR BLD AUTO: 0.6 % (ref 0–0.5)
LYMPHOCYTES # BLD AUTO: 0.7 K/UL (ref 1–4.8)
LYMPHOCYTES # BLD AUTO: 1 K/UL (ref 1–4.8)
LYMPHOCYTES NFR BLD: 12.1 % (ref 18–48)
LYMPHOCYTES NFR BLD: 9 % (ref 18–48)
MAGNESIUM SERPL-MCNC: 2.6 MG/DL (ref 1.6–2.6)
MCH RBC QN AUTO: 28.9 PG (ref 27–31)
MCH RBC QN AUTO: 29.1 PG (ref 27–31)
MCHC RBC AUTO-ENTMCNC: 32.5 G/DL (ref 32–36)
MCHC RBC AUTO-ENTMCNC: 32.5 G/DL (ref 32–36)
MCV RBC AUTO: 89 FL (ref 82–98)
MCV RBC AUTO: 89 FL (ref 82–98)
METHEMOGLOBIN: 0.9 % (ref 0–3)
MODE: ABNORMAL
MODE: ABNORMAL
MONOCYTES # BLD AUTO: 0.6 K/UL (ref 0.3–1)
MONOCYTES # BLD AUTO: 0.7 K/UL (ref 0.3–1)
MONOCYTES NFR BLD: 7.5 % (ref 4–15)
MONOCYTES NFR BLD: 8.1 % (ref 4–15)
NEUTROPHILS # BLD AUTO: 6.4 K/UL (ref 1.8–7.7)
NEUTROPHILS # BLD AUTO: 6.4 K/UL (ref 1.8–7.7)
NEUTROPHILS NFR BLD: 76.6 % (ref 38–73)
NEUTROPHILS NFR BLD: 80.7 % (ref 38–73)
NRBC BLD-RTO: 0 /100 WBC
NRBC BLD-RTO: 0 /100 WBC
PCO2 BLDA: 41.2 MMHG (ref 35–45)
PCO2 BLDA: 41.4 MMHG (ref 35–45)
PCO2 BLDA: 45.3 MMHG (ref 35–45)
PH SMN: 7.41 [PH] (ref 7.35–7.45)
PH SMN: 7.43 [PH] (ref 7.35–7.45)
PH SMN: 7.43 [PH] (ref 7.35–7.45)
PHOSPHATE SERPL-MCNC: 5.4 MG/DL (ref 2.7–4.5)
PLATELET # BLD AUTO: 359 K/UL (ref 150–450)
PLATELET # BLD AUTO: 391 K/UL (ref 150–450)
PMV BLD AUTO: 10.5 FL (ref 9.2–12.9)
PMV BLD AUTO: 11 FL (ref 9.2–12.9)
PO2 BLDA: 28 MMHG (ref 40–60)
PO2 BLDA: 28 MMHG (ref 40–60)
PO2 BLDA: 36 MMHG (ref 40–60)
POC BE: 3 MMOL/L
POC BE: 3 MMOL/L
POC BE: 4 MMOL/L
POC METHB: 0.9 % (ref 0–3)
POC PERFORMED BY: NORMAL
POC SATURATED O2: 53 % (ref 95–100)
POC SATURATED O2: 54 % (ref 95–100)
POC SATURATED O2: 68 % (ref 95–100)
POC TCO2: 29 MMOL/L (ref 24–29)
POC TCO2: 29 MMOL/L (ref 24–29)
POC TCO2: 30 MMOL/L (ref 24–29)
POC TEMPERATURE: 37 C
POCT GLUCOSE: 135 MG/DL (ref 70–110)
POCT GLUCOSE: 136 MG/DL (ref 70–110)
POCT GLUCOSE: 136 MG/DL (ref 70–110)
POCT GLUCOSE: 170 MG/DL (ref 70–110)
POCT GLUCOSE: 173 MG/DL (ref 70–110)
POTASSIUM SERPL-SCNC: 4 MMOL/L (ref 3.5–5.1)
PROT SERPL-MCNC: 7.1 G/DL (ref 6–8.4)
RBC # BLD AUTO: 2.35 M/UL (ref 4.6–6.2)
RBC # BLD AUTO: 2.65 M/UL (ref 4.6–6.2)
SAMPLE: ABNORMAL
SITE: ABNORMAL
SODIUM SERPL-SCNC: 133 MMOL/L (ref 136–145)
SP02: 100
SPECIMEN SOURCE: NORMAL
T GONDII IGG SER QL IA: NORMAL
T GONDII IGG SERPL IA-ACNC: <5 IU/ML (ref 0–6.4)
WBC # BLD AUTO: 7.96 K/UL (ref 3.9–12.7)
WBC # BLD AUTO: 8.36 K/UL (ref 3.9–12.7)

## 2023-11-22 PROCEDURE — 63600175 PHARM REV CODE 636 W HCPCS: Mod: NTX | Performed by: NURSE PRACTITIONER

## 2023-11-22 PROCEDURE — 99232 SBSQ HOSP IP/OBS MODERATE 35: CPT | Mod: NTX,,, | Performed by: NURSE PRACTITIONER

## 2023-11-22 PROCEDURE — 82803 BLOOD GASES ANY COMBINATION: CPT | Mod: NTX

## 2023-11-22 PROCEDURE — 84100 ASSAY OF PHOSPHORUS: CPT | Mod: NTX | Performed by: STUDENT IN AN ORGANIZED HEALTH CARE EDUCATION/TRAINING PROGRAM

## 2023-11-22 PROCEDURE — 83735 ASSAY OF MAGNESIUM: CPT | Mod: NTX | Performed by: STUDENT IN AN ORGANIZED HEALTH CARE EDUCATION/TRAINING PROGRAM

## 2023-11-22 PROCEDURE — 97110 THERAPEUTIC EXERCISES: CPT | Mod: NTX

## 2023-11-22 PROCEDURE — 25000003 PHARM REV CODE 250: Mod: NTX | Performed by: REGISTERED NURSE

## 2023-11-22 PROCEDURE — 99291 CRITICAL CARE FIRST HOUR: CPT | Mod: NTX,,, | Performed by: REGISTERED NURSE

## 2023-11-22 PROCEDURE — 25000003 PHARM REV CODE 250: Mod: NTX | Performed by: PHYSICIAN ASSISTANT

## 2023-11-22 PROCEDURE — 85730 THROMBOPLASTIN TIME PARTIAL: CPT | Mod: NTX | Performed by: STUDENT IN AN ORGANIZED HEALTH CARE EDUCATION/TRAINING PROGRAM

## 2023-11-22 PROCEDURE — 27000202 HC IABP, ADD'L DAY: Mod: NTX

## 2023-11-22 PROCEDURE — 80053 COMPREHEN METABOLIC PANEL: CPT | Mod: NTX | Performed by: STUDENT IN AN ORGANIZED HEALTH CARE EDUCATION/TRAINING PROGRAM

## 2023-11-22 PROCEDURE — 25000003 PHARM REV CODE 250: Mod: NTX | Performed by: STUDENT IN AN ORGANIZED HEALTH CARE EDUCATION/TRAINING PROGRAM

## 2023-11-22 PROCEDURE — 85025 COMPLETE CBC W/AUTO DIFF WBC: CPT | Mod: 91,NTX | Performed by: STUDENT IN AN ORGANIZED HEALTH CARE EDUCATION/TRAINING PROGRAM

## 2023-11-22 PROCEDURE — 20000000 HC ICU ROOM: Mod: NTX

## 2023-11-22 PROCEDURE — 63600367 HC NITRIC OXIDE PER HOUR: Mod: NTX

## 2023-11-22 PROCEDURE — 85025 COMPLETE CBC W/AUTO DIFF WBC: CPT | Mod: NTX | Performed by: STUDENT IN AN ORGANIZED HEALTH CARE EDUCATION/TRAINING PROGRAM

## 2023-11-22 PROCEDURE — 63600175 PHARM REV CODE 636 W HCPCS: Mod: NTX | Performed by: STUDENT IN AN ORGANIZED HEALTH CARE EDUCATION/TRAINING PROGRAM

## 2023-11-22 PROCEDURE — 99900035 HC TECH TIME PER 15 MIN (STAT): Mod: NTX

## 2023-11-22 PROCEDURE — 63600175 PHARM REV CODE 636 W HCPCS: Mod: NTX | Performed by: REGISTERED NURSE

## 2023-11-22 PROCEDURE — 94761 N-INVAS EAR/PLS OXIMETRY MLT: CPT | Mod: NTX,XB

## 2023-11-22 PROCEDURE — 25000003 PHARM REV CODE 250: Mod: NTX | Performed by: INTERNAL MEDICINE

## 2023-11-22 PROCEDURE — 83050 HGB METHEMOGLOBIN QUAN: CPT | Mod: NTX

## 2023-11-22 PROCEDURE — 27000221 HC OXYGEN, UP TO 24 HOURS: Mod: NTX

## 2023-11-22 RX ORDER — BACLOFEN 5 MG/1
5 TABLET ORAL 2 TIMES DAILY PRN
Status: DISCONTINUED | OUTPATIENT
Start: 2023-11-22 | End: 2023-12-04

## 2023-11-22 RX ORDER — POLYETHYLENE GLYCOL 3350 17 G/17G
17 POWDER, FOR SOLUTION ORAL DAILY
Status: DISCONTINUED | OUTPATIENT
Start: 2023-11-22 | End: 2023-12-04

## 2023-11-22 RX ORDER — LIDOCAINE 50 MG/G
2 PATCH TOPICAL
Status: DISCONTINUED | OUTPATIENT
Start: 2023-11-22 | End: 2023-11-28

## 2023-11-22 RX ADMIN — AMIODARONE HYDROCHLORIDE 400 MG: 200 TABLET ORAL at 08:11

## 2023-11-22 RX ADMIN — INSULIN ASPART 12 UNITS: 100 INJECTION, SOLUTION INTRAVENOUS; SUBCUTANEOUS at 05:11

## 2023-11-22 RX ADMIN — DOBUTAMINE IN DEXTROSE 5 MCG/KG/MIN: 400 INJECTION, SOLUTION INTRAVENOUS at 02:11

## 2023-11-22 RX ADMIN — INSULIN ASPART 12 UNITS: 100 INJECTION, SOLUTION INTRAVENOUS; SUBCUTANEOUS at 01:11

## 2023-11-22 RX ADMIN — ATORVASTATIN CALCIUM 40 MG: 40 TABLET, FILM COATED ORAL at 08:11

## 2023-11-22 RX ADMIN — HEPARIN SODIUM AND DEXTROSE 16 UNITS/KG/HR: 10000; 5 INJECTION INTRAVENOUS at 03:11

## 2023-11-22 RX ADMIN — INSULIN ASPART 1 UNITS: 100 INJECTION, SOLUTION INTRAVENOUS; SUBCUTANEOUS at 01:11

## 2023-11-22 RX ADMIN — Medication 400 MG: at 08:11

## 2023-11-22 RX ADMIN — BACLOFEN 5 MG: 5 TABLET ORAL at 09:11

## 2023-11-22 RX ADMIN — HYDRALAZINE HYDROCHLORIDE 25 MG: 25 TABLET, FILM COATED ORAL at 01:11

## 2023-11-22 RX ADMIN — ASPIRIN 81 MG: 81 TABLET, COATED ORAL at 08:11

## 2023-11-22 RX ADMIN — HYDRALAZINE HYDROCHLORIDE 25 MG: 25 TABLET, FILM COATED ORAL at 05:11

## 2023-11-22 RX ADMIN — LIDOCAINE 2 PATCH: 50 PATCH CUTANEOUS at 01:11

## 2023-11-22 RX ADMIN — INSULIN DETEMIR 18 UNITS: 100 INJECTION, SOLUTION SUBCUTANEOUS at 09:11

## 2023-11-22 RX ADMIN — FUROSEMIDE 40 MG/HR: 10 INJECTION, SOLUTION INTRAMUSCULAR; INTRAVENOUS at 02:11

## 2023-11-22 RX ADMIN — INSULIN ASPART 1 UNITS: 100 INJECTION, SOLUTION INTRAVENOUS; SUBCUTANEOUS at 08:11

## 2023-11-22 RX ADMIN — SENNOSIDES AND DOCUSATE SODIUM 2 TABLET: 8.6; 5 TABLET ORAL at 01:11

## 2023-11-22 RX ADMIN — THERA TABS 1 TABLET: TAB at 08:11

## 2023-11-22 RX ADMIN — FUROSEMIDE 40 MG/HR: 10 INJECTION, SOLUTION INTRAMUSCULAR; INTRAVENOUS at 10:11

## 2023-11-22 RX ADMIN — ACETAMINOPHEN 650 MG: 325 TABLET ORAL at 08:11

## 2023-11-22 RX ADMIN — GABAPENTIN 300 MG: 300 CAPSULE ORAL at 09:11

## 2023-11-22 RX ADMIN — HYDRALAZINE HYDROCHLORIDE 25 MG: 25 TABLET, FILM COATED ORAL at 09:11

## 2023-11-22 RX ADMIN — BACLOFEN 5 MG: 5 TABLET ORAL at 04:11

## 2023-11-22 RX ADMIN — ACETAMINOPHEN 650 MG: 325 TABLET ORAL at 03:11

## 2023-11-22 RX ADMIN — PANTOPRAZOLE SODIUM 40 MG: 40 TABLET, DELAYED RELEASE ORAL at 05:11

## 2023-11-22 RX ADMIN — POLYETHYLENE GLYCOL 3350 17 G: 17 POWDER, FOR SOLUTION ORAL at 01:11

## 2023-11-22 RX ADMIN — FUROSEMIDE 40 MG/HR: 10 INJECTION, SOLUTION INTRAMUSCULAR; INTRAVENOUS at 01:11

## 2023-11-22 RX ADMIN — INSULIN ASPART 12 UNITS: 100 INJECTION, SOLUTION INTRAVENOUS; SUBCUTANEOUS at 08:11

## 2023-11-22 RX ADMIN — HEPARIN SODIUM AND DEXTROSE 16 UNITS/KG/HR: 10000; 5 INJECTION INTRAVENOUS at 10:11

## 2023-11-22 NOTE — PLAN OF CARE
Problem: Physical Therapy  Goal: Physical Therapy Goal  Description: Goals to be met by: 12/15/23     Patient will increase functional independence with mobility by performin. Sit to stand transfer with [place]  2. Gait  x 400 feet with [place] using LRAD.   3. Upper extremity exercise program with Green theraband x15 reps per handout, with Supervision    Outcome: Ongoing, Progressing   Goals remain appropriate 2023

## 2023-11-22 NOTE — PLAN OF CARE
Cardiac ICU Care Plan    POC reviewed with Radha Scar and family. Questions and concerns addressed. CVP 12, 9, 11, SvO2 44%, 55%. R fem IABP placed today. Lasix, , heparin continued, Ketty continued. 1 unit PRBCs ordered, currently infusing. See below and flowsheets for full assessment and VS info.       Neuro:  Michelle Coma Scale  Best Eye Response: 4-->(E4) spontaneous  Best Motor Response: 6-->(M6) obeys commands  Best Verbal Response: 5-->(V5) oriented  Michelle Coma Scale Score: 15  Assessment Qualifiers: patient not sedated/intubated  Pupil PERRLA: yes    24 hr Temp:  [97.4 °F (36.3 °C)-97.7 °F (36.5 °C)]      CV:  Rhythm: normal sinus rhythm   DVT prophylaxis: VTE Required Core Measure: Pharmacological prophylaxis initiated/maintained    CVP (mean): 9 mmHg (11/21/23 1148)    Pulmonary Artery Catheter Assessment  11/20/23 1330 Internal Jugular Left-Current Insertion Depth (cm): 53 cm (11/21/23 1501)  PAP: 65/28 (11/21/23 1501)  SVO2 (%): 44 % (11/21/23 0701)    IABP Mode: Auto  IABP Rate: 1:1  IABP Trigger: ECG  IABP Augmented Diastolic Pressure: 109          Pulses  Right Radial Pulse: 2+ (normal)  Left Radial Pulse: 2+ (normal)  Right Dorsalis Pedis Pulse: 1+ (weak)  Left Dorsalis Pedis Pulse: 1+ (weak)  Right Posterior Tibial Pulse: 1+ (weak)  Left Posterior Tibial Pulse: 1+ (weak)    Resp:  Flow (L/min): 4  Oxygen Concentration (%): 36    GI/:  GI prophylaxis: yes  Diet/Nutrition Received: consistent carb/diabetic diet  Last Bowel Movement: 11/20/23  Voiding Characteristics: voids spontaneously without difficulty   Intake/Output Summary (Last 24 hours) at 11/21/2023 1825  Last data filed at 11/21/2023 1801  Gross per 24 hour   Intake 2723.46 ml   Output 2615 ml   Net 108.46 ml        Nutritional Supplement Intake: Quantity 1, Type: Boost    Labs/Accuchecks:  Recent Labs   Lab 11/19/23  1642 11/20/23  0325 11/20/23  0855 11/21/23  0323   WBC 6.12 5.57  --  6.33   RBC 2.43* 2.30*  --  2.24*   HGB 7.1*  "6.4*  --  6.5*   HCT 21.8* 20.7* 21* 20.1*    267  --  328      Recent Labs   Lab 11/15/23  1601 11/16/23  0436 11/20/23 0325 11/20/23 1958 11/21/23 0323   INR 1.1  --   --   --   --    APTT  --    < > 54.1* 42.2* 51.0*    < > = values in this interval not displayed.      Recent Labs     11/21/23 0323 11/21/23  1609   * 132*   K 4.3 4.1   CO2 24 25   CL 92* 92*   BUN 94* 89*   CREATININE 3.0* 3.0*   ALKPHOS 82  --    ALT 33  --    AST 28  --    BILITOT 0.5  --      No results for input(s): "CPK", "CPKMB", "MB", "TROPONINI" in the last 168 hours.   Recent Labs     11/20/23 2027 11/20/23 2304 11/21/23 0322 11/21/23  0823 11/21/23  1237   PH 7.371 7.403 7.419  --   --    PCO2 41.9 40.4 41.2  --   --    PO2 20* 21* 24* 24* 29*   HCO3 24.3 25.2 26.7  --   --    POCSATURATED 31 34 44 44 55   BE -1 0 2  --   --        Electrolytes: N/A - electrolytes WDL  Accuchecks: ACHS    Gtts/LDAs:   sodium chloride 0.9%      sodium chloride 0.9% Stopped (11/21/23 1624)    DOBUTamine 5 mcg/kg/min (11/21/23 1801)    furosemide (LASIX) 500 mg in 50 mL infusion (conc: 10 mg/mL) 40 mg/hr (11/21/23 1801)    heparin (porcine) in D5W 16 Units/kg/hr (11/21/23 1801)    nitric oxide gas      sodium chloride 0.9% 250 mL/hr at 11/20/23 2002       Lines/Drains/Airways       Central Venous Catheter Line  Duration              Introducer Single Lumen 11/20/23 1330 Internal Jugular Left 1 day    Pulmonary Artery Catheter Assessment  11/20/23 1330 Internal Jugular Left 1 day              Drain  Duration                  Urine Pouch  11/20/23 2202 other (see comments) <1 day              Line  Duration                  IABP 11/21/23 1048 7.5 Fr. 40 mL <1 day              Peripheral Intravenous Line  Duration                  Midline Catheter Insertion/Assessment  - Single Lumen 11/14/23 1113 Left brachial vein 20g x 10cm 7 days                    Skin/Wounds  Bathing/Skin Care: (S) bath, complete;linen changed;dressed/undressed " (11/21/23 0654)  Wounds: No  Wound care consulted: No

## 2023-11-22 NOTE — PROGRESS NOTES
[facility] - Cardiac Intensive Care  Endocrinology  Progress Note    Admit Date: 2023     Reason for Consult: Management of T2DM, Hyperglycemia     Surgical Procedure and Date: n/a    Diabetes diagnosis year:     Home Diabetes Medications:  Metformin (off since October)   Lab Results   Component Value Date    HGBA1C 7.6 (H) 10/12/2023       How often checking glucose at home?  Once daily in the AM    BG readings on regimen: 150-160s  Hypoglycemia on the regimen?  No  Missed doses on regimen?  n/a    Diabetes Complications include:     Hyperglycemia    Complicating diabetes co morbidities:   CAD s/p CABG, HTN, HLD      HPI:   Patient is a 63 y.o. male with a diagnosis of CAD s/p 3v CABG (unclear anatomy, ), ICM with a recent EF of 15-20% s/p ICD (medtronik ), DM2 (a1c 7.7), HTN, HLD, Vfib on amio who presents to the ED with CC of SOB. In the ED he was AF with stable VS on RA.  CBC showing chronic anemia.  CMP notable for acute on chronic CKD with baseline around 2.1 and Cr now 2.6.  BNP elevated.  Lactate neg.  CXR showing pulmonary edema. He was subsequently admitted to the CCU for diuresis.  Endocrinology consulted for management of T2DM.          Interval HPI:   Overnight events: No acute events overnight. Patient in room [id number] A. Blood glucose stable. BG at goal on current insulin regimen (SSI, prandial, and basal insulin ). Steroid use- None. 1 Day Post-Op  Renal function- Abnormal - Creatinine 2.9   Vasopressors-  None       Endocrine will continue to follow and manage insulin orders inpatient.         Diet diabetic 2800 Calorie; Fluid - 2000mL; Standard Tray     Eatin%  Nausea: No  Hypoglycemia and intervention: No  Fever: No  TPN and/or TF: No      /70 (BP Location: Left forearm, Patient Position: Lying)   Pulse 79   Temp 97.9 °F (36.6 °C) (Oral)   Resp 17   Ht 6' (1.829 m)   Wt 81.6 kg (180 lb)   SpO2 100%   BMI 24.41 kg/m²     Labs Reviewed and Include     Recent Labs   Lab 11/22/23  0342   *   CALCIUM 9.3   ALBUMIN 3.2*   PROT 7.1   *   K 4.0   CO2 29   CL 92*   BUN 87*   CREATININE 2.9*   ALKPHOS 86   ALT 31   AST 24   BILITOT 0.6     Lab Results   Component Value Date    WBC 7.96 11/22/2023    HGB 7.7 (L) 11/22/2023    HCT 23.7 (L) 11/22/2023    MCV 89 11/22/2023     11/22/2023     Recent Labs   Lab 11/15/23  1720   TSH 1.381     Lab Results   Component Value Date    HGBA1C 7.6 (H) 10/12/2023       Nutritional status:   Body mass index is 24.41 kg/m².  Lab Results   Component Value Date    ALBUMIN 3.2 (L) 11/22/2023    ALBUMIN 3.0 (L) 11/21/2023    ALBUMIN 2.9 (L) 11/20/2023     Lab Results   Component Value Date    PREALBUMIN 27 11/15/2023       Estimated Creatinine Clearance: 29.4 mL/min (A) (based on SCr of 2.9 mg/dL (H)).    Accu-Checks  Recent Labs     11/19/23  1645 11/19/23  2109 11/20/23  0854 11/20/23  1150 11/20/23  1639 11/20/23  2109 11/21/23  0822 11/21/23  1223 11/21/23  2120 11/22/23  0819   POCTGLUCOSE 280* 258* 242* 365* 321* 240* 173* 205* 218* 173*       Current Medications and/or Treatments Impacting Glycemic Control  Immunotherapy:    Immunosuppressants       None          Steroids:   Hormones (From admission, onward)      None          Pressors:    Autonomic Drugs (From admission, onward)      None          Hyperglycemia/Diabetes Medications:   Antihyperglycemics (From admission, onward)      Start     Stop Route Frequency Ordered    11/20/23 2100  insulin detemir U-100 (Levemir) pen 18 Units         -- SubQ Nightly 11/20/23 1648    11/20/23 1747  insulin aspart U-100 pen 0-10 Units         -- SubQ Before meals & nightly PRN 11/20/23 1648    11/19/23 1130  insulin aspart U-100 pen 12 Units         -- SubQ 3 times daily with meals 11/19/23 0838            ASSESSMENT and PLAN    Cardiac/Vascular  * Acute on chronic combined systolic and diastolic CHF, NYHA class 4  Managed per primary team  Optimize BG control      PAF  (paroxysmal atrial fibrillation)  May increase insulin resistance.         Renal/  IVÁN (acute kidney injury)  Lab Results   Component Value Date    CREATININE 2.9 (H) 11/22/2023     Avoid insulin stacking  Titrate insulin slowly       Endocrine  Type 2 diabetes mellitus without complication, without long-term current use of insulin  Endocrinology consulted for BG management.   BG goal 140-180  Temporarily NPO, if prolonged will adjust orders.     WBD 0.6 units/kg/day (60/40 split)  - Levemir (Insulin Detemir) 18 units nightly   - Novolog (Insulin Aspart) 12 units TIDWM and prn for BG excursions [facility] [place] (150/25)  - BG checks AC/HS  - Hypoglycemia protocol in place    ** Please notify Endocrine for any change and/or advance in diet**  ** Please call Endocrine for any BG related issues **    Discharge Planning:   TBD. Please notify endocrinology prior to discharge.                 Erasmo Parrish, DNP, FNP  Endocrinology  [facility] - Cardiac Intensive Care

## 2023-11-22 NOTE — ASSESSMENT & PLAN NOTE
IVÁN on CKD2. Baseline Cr of 1.7-2.0.   -Currently 2.9  - Trend along with CVP, continue diuresis with Lasix gtt @40mg/hr   - Hold ARNi, entresto  -Trend BMPs daily

## 2023-11-22 NOTE — PLAN OF CARE
Brief Overnight Plan of Care    Updates:  IABP placed this morning  Hemodynamics trending better since balloon placed  No events otherwise  Diuresing    Infusions:   sodium chloride 0.9%      sodium chloride 0.9% 5 mL/hr at 11/21/23 2002    DOBUTamine 5 mcg/kg/min (11/21/23 2002)    furosemide (LASIX) 500 mg in 50 mL infusion (conc: 10 mg/mL) 40 mg/hr (11/21/23 2002)    heparin (porcine) in D5W 16 Units/kg/hr (11/21/23 2002)    nitric oxide gas      sodium chloride 0.9% 250 mL/hr at 11/20/23 2002       7PM Hemodynamics:  Parameters    CVP 12   SVO2 53   CO 6.5   CI 3.3          I/O this shift:  In: 166.7 [I.V.:166.7]  Out: 350 [Urine:350]      Intake/Output Summary (Last 24 hours) at 11/21/2023 2111  Last data filed at 11/21/2023 2002  Gross per 24 hour   Intake 2804.72 ml   Output 3440 ml   Net -635.28 ml        Plan:  - No changes in current plan of care  - Shooting for net negative overnight      Discussed with staff    Connor Gillies, DO  PGY-IV, Cardiology

## 2023-11-22 NOTE — SUBJECTIVE & OBJECTIVE
Interval History: No acute events overnight. Renal function stable. Filling pressures coming down. Negative fluid balance for last 24hrs. Remains supported on IABP 1:1,  5, and Ketty. Weaning Ketty today. Continuing on Lasix gtt @ 40mg/hr.     Hemodynamics:   CVP 10  Svo2 68  CO 8.5  CI 4.35        Continuous Infusions:   sodium chloride 0.9%      sodium chloride 0.9% 5 mL/hr at 11/22/23 1201    DOBUTamine 5 mcg/kg/min (11/22/23 1201)    furosemide (LASIX) 500 mg in 50 mL infusion (conc: 10 mg/mL) 40 mg/hr (11/22/23 1201)    heparin (porcine) in D5W 16 Units/kg/hr (11/22/23 1201)    nitric oxide gas      sodium chloride 0.9% 250 mL/hr at 11/20/23 2002     Scheduled Meds:   amiodarone  400 mg Oral Daily    aspirin  81 mg Oral Daily    atorvastatin  40 mg Oral Daily    hydrALAZINE  25 mg Oral Q8H    insulin aspart U-100  12 Units Subcutaneous TIDWM    insulin detemir U-100  18 Units Subcutaneous QHS    LIDOcaine  2 patch Transdermal Q24H    magnesium oxide  400 mg Oral Daily    multivitamin  1 tablet Oral Daily    pantoprazole  40 mg Oral Before breakfast    polyethylene glycol  17 g Oral Daily     PRN Meds:0.9%  NaCl infusion (for blood administration), sodium chloride 0.9%, acetaminophen, baclofen, dextrose 10%, dextrose 10%, gabapentin, glucagon (human recombinant), glucose, glucose, heparin (PORCINE), heparin (PORCINE), insulin aspart U-100, ondansetron, senna-docusate 8.6-50 mg, sodium chloride 0.9%, sodium chloride 0.9%    Review of patient's allergies indicates:  No Known Allergies  Objective:     Vital Signs (Most Recent):  Temp: 98.3 °F (36.8 °C) (11/22/23 1101)  Pulse: 81 (11/22/23 1201)  Resp: (!) 21 (11/22/23 1201)  BP: 118/67 (11/22/23 1201)  SpO2: 100 % (11/22/23 1201) Vital Signs (24h Range):  Temp:  [97.5 °F (36.4 °C)-98.4 °F (36.9 °C)] 98.3 °F (36.8 °C)  Pulse:  [70-83] 81  Resp:  [13-28] 21  SpO2:  [97 %-100 %] 100 %  BP: ()/(53-78) 118/67     Patient Vitals for the past 72 hrs (Last 3  readings):   Weight   11/22/23 0400 81.6 kg (180 lb)   11/21/23 1334 75.8 kg (167 lb)   11/21/23 0400 76 kg (167 lb 8.8 oz)       Body mass index is 24.41 kg/m².      Intake/Output Summary (Last 24 hours) at 11/22/2023 1251  Last data filed at 11/22/2023 1201  Gross per 24 hour   Intake 1909.25 ml   Output 3775 ml   Net -1865.75 ml         Hemodynamic Parameters:  PAP: (48-72)/(12-31) 63/25  PAP (Mean):  [26 mmHg-47 mmHg] 39 mmHg    Telemetry: NSR        Physical Exam  HENT:      Head: Normocephalic and atraumatic.   Eyes:      Pupils: Pupils are equal, round, and reactive to light.   Neck:      Comments: Jvp mid neck   Cardiovascular:      Rate and Rhythm: Normal rate and regular rhythm.      Pulses: Normal pulses.      Comments: IABP  Pulmonary:      Effort: Pulmonary effort is normal.      Breath sounds: Normal breath sounds.   Abdominal:      General: Bowel sounds are normal.      Palpations: Abdomen is soft.   Musculoskeletal:      Cervical back: Normal range of motion and neck supple.      Right lower leg: No edema.      Left lower leg: No edema.   Skin:     General: Skin is warm and dry.   Neurological:      General: No focal deficit present.      Mental Status: He is oriented to person, place, and time.            Significant Labs:  CBC:  Recent Labs   Lab 11/21/23  0323 11/21/23  2208 11/22/23  0342 11/22/23  0553   WBC 6.33  --  8.36 7.96   RBC 2.24*  --  2.35* 2.65*   HGB 6.5* 8.0* 6.8* 7.7*   HCT 20.1* 24.1* 20.9* 23.7*     --  391 359   MCV 90  --  89 89   MCH 29.0  --  28.9 29.1   MCHC 32.3  --  32.5 32.5       BNP:  Recent Labs   Lab 11/18/23  1236   *       CMP:  Recent Labs   Lab 11/20/23  0325 11/21/23  0323 11/21/23  1609 11/22/23  0342   * 147* 207* 129*   CALCIUM 8.9 9.2 9.4 9.3   ALBUMIN 2.9* 3.0*  --  3.2*   PROT 6.5 7.0  --  7.1   * 130* 132* 133*   K 4.2 4.3 4.1 4.0   CO2 24 24 25 29   CL 94* 92* 92* 92*   BUN 88* 94* 89* 87*   CREATININE 2.8* 3.0* 3.0* 2.9*  "  ALKPHOS 77 82  --  86   ALT 21 33  --  31   AST 20 28  --  24   BILITOT 0.5 0.5  --  0.6        Coagulation:   Recent Labs   Lab 11/15/23  1601 11/16/23  0436 11/20/23 1958 11/21/23 0323 11/22/23 0342   INR 1.1  --   --   --   --    APTT  --    < > 42.2* 51.0* 42.3*    < > = values in this interval not displayed.       LDH:  No results for input(s): "LDH" in the last 72 hours.  Microbiology:  Microbiology Results (last 7 days)       ** No results found for the last 168 hours. **            BMP:   Recent Labs   Lab 11/22/23 0342   *   *   K 4.0   CL 92*   CO2 29   BUN 87*   CREATININE 2.9*   CALCIUM 9.3   MG 2.6       I have reviewed all pertinent labs within the past 24 hours.    Estimated Creatinine Clearance: 29.4 mL/min (A) (based on SCr of 2.9 mg/dL (H)).    Diagnostic Results:  I have reviewed all pertinent imaging results/findings within the past 24 hours.  "

## 2023-11-22 NOTE — PLAN OF CARE
Problem: Occupational Therapy  Goal: Occupational Therapy Goal  Description: Goals to be met by: 12/15/23     Patient will increase functional independence with ADLs by performing:    LE Dressing with Supervision.  Grooming while standing at sink with Supervision.  Toileting from toilet with Supervision for hygiene and clothing management.   Supine to sit with Supervision.  Step transfer with Supervision  Blairs with BUE HEP to improve activity tolerance to complete ADLs and IADLs  Within home ambulation distances with supervision and LRAD necessary to complete ADLs.        Outcome: Ongoing, Progressing   Goals remain appropriate

## 2023-11-22 NOTE — PROGRESS NOTES
11/21/23 1902   IABP Vital Signs   IABP Assisted Systolic/Diastolic 94/60   IABP Mean 86   IABP Augmented Diastolic Pressure 104        IABP 11/21/23 1048 7.5 Fr. 40 mL   Placement Date/Time: 11/21/23 1048   Present Prior to Hospital Arrival?: No  Inserted by: MD  Insertion Site: Right femoral  Sutured: Yes  Catheter Size: 7.5 Fr.  Catheter Volume: 40 mL  Verification by X-ray: Yes   Site Assessment Clean;Dry;Intact   Helium Tubing Clear   Arterial Line Status Arterial fluids per protocol;Intact and in place   Arterial Line Interventions Zeroed and calibrated;Leveled;Connections checked and tightened;Flushed per protocol   Positioning HOB up 15 degrees   Dressing Occlusive   Dressing Status Dry;Intact;Clean   Dressing Intervention Integrity maintained   Dressing Change Due 11/28/23   Color/Movement/Sensation Capillary refill less than 3 sec   Pulses   Right Radial Pulse 2+ (normal);palpation   Left Radial Pulse 2+ (normal);palpation   Right Dorsalis Pedis Pulse 1+ (weak);palpation   Left Dorsalis Pedis Pulse 1+ (weak);palpation   Right Posterior Tibial Pulse 1+ (weak);palpation   Left Posterior Tibial Pulse 1+ (weak);palpation   IABP Balloon Controls   IABP Mode Auto   IABP Rate 1:1   IABP Trigger ECG   IABP Transducer Zeroed   IABP Fill Auto

## 2023-11-22 NOTE — PT/OT/SLP PROGRESS
Occupational Therapy      Patient Name:  Radha Abbott   MRN:  [id number]    Patient not seen today secondary to only being seen by 1 skilled discipline secondary to femoral IABP restrictions. PT to update OT once restrictions lifted and medically appropriate. Will follow-up as appropriate.    11/22/2023

## 2023-11-22 NOTE — ASSESSMENT & PLAN NOTE
Lab Results   Component Value Date    CREATININE 2.9 (H) 11/22/2023     Avoid insulin stacking  Titrate insulin slowly

## 2023-11-22 NOTE — PLAN OF CARE
"CICU Care Plan    POC reviewed with Radha Abbott and family at 1902. Pt verbalized understanding. Questions and concerns addressed. No acute events today. HTS Fellow notified of hgb, recollect pending. Pt progressing toward goals. Security measures in place, plan of care to continue. See below and flowsheets for full assessment and VS info.     0638-Repeat CBC Hgb = 7.7, care continues as ordered per Primary Team.     CVP: 12, 9, 7  SvO2: 53    Gtts:  Dobutamine @ 5mcg/kg/min  Lasix @ 40 mg/hr  Heparin @ 16 units/kg/min - aptt this AM 42.3, therapeutic range    Neuro:  Michelle Coma Scale  Best Eye Response: 4-->(E4) spontaneous  Best Motor Response: 6-->(M6) obeys commands  Best Verbal Response: 5-->(V5) oriented  [place] Coma Scale Score: 15  Assessment Qualifiers: patient not sedated/intubated, no eye obstruction present  Pupil PERRLA: yes     24 hr Temp:  [97.5 °F (36.4 °C)-98.4 °F (36.9 °C)]     CV:   Rhythm: normal sinus rhythm  BP goals:   MAP > 65    Resp:      Oxygen Concentration (%): 36    Plan:  4L NC on 10 PPM of Ketty    GI/:     Diet/Nutrition Received: consistent carb/diabetic diet  Last Bowel Movement: 11/20/23  Voiding Characteristics: voids spontaneously without difficulty    Intake/Output Summary (Last 24 hours) at 11/22/2023 0540  Last data filed at 11/22/2023 0502  Gross per 24 hour   Intake 3108.87 ml   Output 3850 ml   Net -741.13 ml     Unmeasured Output  Urine Occurrence: 1  Stool Occurrence: 1    Labs/Accuchecks:  Recent Labs   Lab 11/22/23  0342   WBC 8.36   RBC 2.35*   HGB 6.8*   HCT 20.9*         Recent Labs   Lab 11/22/23  0342   *   K 4.0   CO2 29   CL 92*   BUN 87*   CREATININE 2.9*   ALKPHOS 86   ALT 31   AST 24   BILITOT 0.6      Recent Labs   Lab 11/15/23  1601 11/16/23  0436 11/22/23  0342   INR 1.1  --   --    APTT  --    < > 42.3*    < > = values in this interval not displayed.    No results for input(s): "CPK", "CPKMB", "TROPONINI", "MB" in the last 168 " hours.    Electrolytes: N/A - electrolytes WDL  Accuchecks: ACHS    Gtts:   sodium chloride 0.9%      sodium chloride 0.9% 5 mL/hr at 11/22/23 0502    DOBUTamine 5 mcg/kg/min (11/22/23 0502)    furosemide (LASIX) 500 mg in 50 mL infusion (conc: 10 mg/mL) 40 mg/hr (11/22/23 0502)    heparin (porcine) in D5W 16 Units/kg/hr (11/22/23 0502)    nitric oxide gas      sodium chloride 0.9% 250 mL/hr at 11/20/23 2002       LDA/Wounds:  Lines/Drains/Airways       Central Venous Catheter Line  Duration              Introducer Single Lumen 11/20/23 1330 Internal Jugular Left 1 day    Pulmonary Artery Catheter Assessment  11/20/23 1330 Internal Jugular Left 1 day              Drain  Duration                  Urine Pouch  11/20/23 2202 other (see comments) 1 day              Line  Duration                  IABP 11/21/23 1048 7.5 Fr. 40 mL <1 day              Peripheral Intravenous Line  Duration                  Midline Catheter Insertion/Assessment  - Single Lumen 11/14/23 1113 Left brachial vein 20g x 10cm 7 days                  Wounds: No  Wound care consulted: No    Problem: Adult Inpatient Plan of Care  Goal: Plan of Care Review  Outcome: Ongoing, Progressing  Goal: Patient-Specific Goal (Individualized)  Outcome: Ongoing, Progressing  Goal: Absence of Hospital-Acquired Illness or Injury  Outcome: Ongoing, Progressing  Goal: Optimal Comfort and Wellbeing  Outcome: Ongoing, Progressing  Goal: Readiness for Transition of Care  Outcome: Ongoing, Progressing     Problem: Diabetes Comorbidity  Goal: Blood Glucose Level Within Targeted Range  Outcome: Ongoing, Progressing     Problem: Fluid and Electrolyte Imbalance (Acute Kidney Injury/Impairment)  Goal: Fluid and Electrolyte Balance  Outcome: Ongoing, Progressing     Problem: Oral Intake Inadequate (Acute Kidney Injury/Impairment)  Goal: Optimal Nutrition Intake  Outcome: Ongoing, Progressing     Problem: Renal Function Impairment (Acute Kidney Injury/Impairment)  Goal:  Effective Renal Function  Outcome: Ongoing, Progressing     Problem: Infection  Goal: Absence of Infection Signs and Symptoms  Outcome: Ongoing, Progressing     Problem: Adjustment to Illness (Heart Failure)  Goal: Optimal Coping  Outcome: Ongoing, Progressing     Problem: Cardiac Output Decreased (Heart Failure)  Goal: Optimal Cardiac Output  Outcome: Ongoing, Progressing     Problem: Dysrhythmia (Heart Failure)  Goal: Stable Heart Rate and Rhythm  Outcome: Ongoing, Progressing     Problem: Fluid Imbalance (Heart Failure)  Goal: Fluid Balance  Outcome: Ongoing, Progressing     Problem: Functional Ability Impaired (Heart Failure)  Goal: Optimal Functional Ability  Outcome: Ongoing, Progressing     Problem: Oral Intake Inadequate (Heart Failure)  Goal: Optimal Nutrition Intake  Outcome: Ongoing, Progressing     Problem: Respiratory Compromise (Heart Failure)  Goal: Effective Oxygenation and Ventilation  Outcome: Ongoing, Progressing     Problem: Sleep Disordered Breathing (Heart Failure)  Goal: Effective Breathing Pattern During Sleep  Outcome: Ongoing, Progressing     Problem: Cardiac Output Decreased  Goal: Effective Cardiac Output  Outcome: Ongoing, Progressing     Problem: Fall Injury Risk  Goal: Absence of Fall and Fall-Related Injury  Outcome: Ongoing, Progressing     Problem: Coping Ineffective  Goal: Effective Coping  Outcome: Ongoing, Progressing     Problem: Skin Injury Risk Increased  Goal: Skin Health and Integrity  Outcome: Ongoing, Progressing

## 2023-11-22 NOTE — PT/OT/SLP PROGRESS
Physical Therapy Treatment    Patient Name:  Radha Abbott   MRN:  [id number]    Recommendations:     Discharge Recommendations: No Therapy Indicated  Discharge Equipment Recommendations: other (see comments) (continuing to assess DME needs)  Barriers to discharge: None    Assessment:     Radha Abbott is a 63 y.o. male admitted with a medical diagnosis of Acute on chronic combined systolic and diastolic CHF, NYHA class 4.  He presents with the following impairments/functional limitations: weakness, impaired functional mobility, decreased safety awareness, impaired endurance, gait instability, pain, impaired balance, decreased lower extremity function. Pt tolerated session fair today with focus on bed level activities 2/2 femoral IABP placement. Pt reports having chest pain which he was premedicated for but was able to perform therapeutic exercises from handout for BUE. Pt will continue to benefit from skilled PT 2x/week to progress physically. Pt will not required further therapy following acute care stay and when medically stable. Pt s/p femoral IABP placement 11/21/23    Rehab Prognosis: Good; patient would benefit from acute skilled PT services to address these deficits and reach maximum level of function.    Recent Surgery: Procedure(s) (LRB):  INSERTION, INTRA-AORTIC BALLOON PUMP (Right) 1 Day Post-Op    Plan:     During this hospitalization, patient to be seen 2 x/week to address the identified rehab impairments via therapeutic activities, gait training and progress toward the following goals:    Plan of Care Expires:  12/15/23    Subjective     Chief Complaint: chest pain, pre-medicated  Patient/Family Comments/goals: to get better  Pain/Comfort:  Pain Rating 1: 5/10  Location 1: chest  Pain Addressed 1: Pre-medicate for activity, Distraction  Pain Rating Post-Intervention 1: 5/10      Objective:     Communicated with nurse prior to session.  Patient found HOB elevated with blood pressure cuff, pulse ox  (continuous), telemetry, PICC line, Other (comments), IABP (nitric oxide, [place]sp[place] Siva) with nurse upon PT entry to room.     General Precautions: Standard, fall, IABP, Highsp[place] Siva catheter (PA cordis)  Orthopedic Precautions: N/A  Braces: N/A  Respiratory Status: Nasal cannula, flow 4 L/min     Functional Mobility:  Bed level activities 2/2 femoral IABP placement      AM-PAC 6 CLICK MOBILITY  Turning over in bed (including adjusting bedclothes, sheets and blankets)?: 1  Sitting down on and standing up from a chair with arms (e.g., wheelchair, bedside commode, etc.): 1  Moving from lying on back to sitting on the side of the bed?: 1  Moving to and from a bed to a chair (including a wheelchair)?: 1  Need to walk in hospital room?: 1  Climbing 3-5 steps with a railing?: 1  Basic Mobility Total Score: 6       Treatment & Education:  PT demonstrated and verbally explain BUE exercises from handout to be performed by the patient to maintain BUE progress with pt able to perform x5 reps each of the following with green theraband: shoulder flexion, shoulder abd, elbow flexion, elbow extension, shoulder ER, and shoulder abd at 90 degrees flexion. Pt instructed to perform x10 reps 2 times a day.    PT verbally educated pt on PT POC with pt verbalizing understanding of such.     Patient left HOB elevated with all lines intact and call button in reach..    GOALS:   Multidisciplinary Problems       Physical Therapy Goals          Problem: Physical Therapy    Goal Priority Disciplines Outcome Goal Variances Interventions   Physical Therapy Goal     PT, PT/OT Ongoing, Progressing     Description: Goals to be met by: 12/15/23     Patient will increase functional independence with mobility by performin. Sit to stand transfer with [place]  2. Gait  x 400 feet with [place] using LRAD.   3. Upper extremity exercise program with Green theraband x15 reps per handout, with Supervision                   Multidisciplinary  Problems (Resolved)          Problem: Physical Therapy    Goal Priority Disciplines Outcome Goal Variances Interventions   Physical Therapy Goal   (Resolved)     PT, PT/OT Met     Description: Goals to be met by: 23     Patient will increase functional independence with mobility by performin. Evaluate pt's need for physical therapy.                          Time Tracking:     PT Received On: 23  PT Start Time: 59     PT Stop Time: 909  PT Total Time (min): 10 min     Billable Minutes: Therapeutic Exercise 10 minutes    Treatment Type: Treatment  PT/PTA: PT     Number of PTA visits since last PT visit: 0     2023

## 2023-11-22 NOTE — ASSESSMENT & PLAN NOTE
Pt with known ICM with an EF of 25% on home  presenting with decompensated HF.  Not in cardiogenic shock    RHC 11/14: RA 14, PA 70/35, PCWP 32, CO 4.1, CI 2.1.   Repeat RHC 11/20 RA RA  20  PA 60/29 (39)  PCWP 29    CO  4.6 l/min  CI  2.3 l/min/m2  2D echo 11/21 showed EF 15-20%, mild RV dysfunction, mild MR, LVEDD 6.9     - Home  gtt at 5  - home GDMT: entresto 24/26mg BID (on hold 2/2 IVÁN), Hydralazine 25 q8h being continued  - home diuretics: Was taking lasix 40 bid  - s/p IABP placement 11/21. Augmenting 90s at 1:1  - Continue VAD/OHTx workup. (Leaning towards LVAD) On step 4  -Blood type A+  -PT/OT and nutrition   -Continue current support with  5, Ketty 5ppm, Lasix 40mg/hr  -Monitor strict I/Os  -trend CVP, sv02, and PA pressures

## 2023-11-22 NOTE — ASSESSMENT & PLAN NOTE
Endocrinology consulted for BG management.   BG goal 140-180  Temporarily NPO, if prolonged will adjust orders.     WBD 0.6 units/kg/day (60/40 split)  - Levemir (Insulin Detemir) 18 units nightly   - Novolog (Insulin Aspart) 12 units TIDWM and prn for BG excursions [facility] SSI (150/25)  - BG checks AC/HS  - Hypoglycemia protocol in place    ** Please notify Endocrine for any change and/or advance in diet**  ** Please call Endocrine for any BG related issues **    Discharge Planning:   TBD. Please notify endocrinology prior to discharge.

## 2023-11-22 NOTE — NURSING
Patient lying in bed, on IABP support, spouse at bedside, both AAOx4. Follow up on pre-VAD education. No questions or concerns at this time. Patient reports low back pain and mild constipation. Discussed with bedside RN and MD, miralax and lidocaine patches ordered. Will continue to follow.   I will SWITCH the dose or number of times a day I take the medications listed below when I get home from the hospital:  None

## 2023-11-22 NOTE — SUBJECTIVE & OBJECTIVE
Interval HPI:   Overnight events: No acute events overnight. Patient in room [id number] A. Blood glucose stable. BG at goal on current insulin regimen (SSI, prandial, and basal insulin ). Steroid use- None. 1 Day Post-Op  Renal function- Abnormal - Creatinine 2.9   Vasopressors-  None       Endocrine will continue to follow and manage insulin orders inpatient.         Diet diabetic 2800 Calorie; Fluid - 2000mL; Standard Tray     Eatin%  Nausea: No  Hypoglycemia and intervention: No  Fever: No  TPN and/or TF: No      /70 (BP Location: Left forearm, Patient Position: Lying)   Pulse 79   Temp 97.9 °F (36.6 °C) (Oral)   Resp 17   Ht 6' (1.829 m)   Wt 81.6 kg (180 lb)   SpO2 100%   BMI 24.41 kg/m²     Labs Reviewed and Include    Recent Labs   Lab 23  0342   *   CALCIUM 9.3   ALBUMIN 3.2*   PROT 7.1   *   K 4.0   CO2 29   CL 92*   BUN 87*   CREATININE 2.9*   ALKPHOS 86   ALT 31   AST 24   BILITOT 0.6     Lab Results   Component Value Date    WBC 7.96 2023    HGB 7.7 (L) 2023    HCT 23.7 (L) 2023    MCV 89 2023     2023     Recent Labs   Lab 11/15/23  1720   TSH 1.381     Lab Results   Component Value Date    HGBA1C 7.6 (H) 10/12/2023       Nutritional status:   Body mass index is 24.41 kg/m².  Lab Results   Component Value Date    ALBUMIN 3.2 (L) 2023    ALBUMIN 3.0 (L) 2023    ALBUMIN 2.9 (L) 2023     Lab Results   Component Value Date    PREALBUMIN 27 11/15/2023       Estimated Creatinine Clearance: 29.4 mL/min (A) (based on SCr of 2.9 mg/dL (H)).    Accu-Checks  Recent Labs     23  1645 23  2109 23  0854 23  1150 23  1639 23  2109 23  0822 23  1223 230 23  0819   POCTGLUCOSE 280* 258* 242* 365* 321* 240* 173* 205* 218* 173*       Current Medications and/or Treatments Impacting Glycemic Control  Immunotherapy:    Immunosuppressants       None           Steroids:   Hormones (From admission, onward)      None          Pressors:    Autonomic Drugs (From admission, onward)      None          Hyperglycemia/Diabetes Medications:   Antihyperglycemics (From admission, onward)      Start     Stop Route Frequency Ordered    11/20/23 2100  insulin detemir U-100 (Levemir) pen 18 Units         -- SubQ Nightly 11/20/23 1648    11/20/23 1747  insulin aspart U-100 pen 0-10 Units         -- SubQ Before meals & nightly PRN 11/20/23 1648    11/19/23 1130  insulin aspart U-100 pen 12 Units         -- SubQ 3 times daily with meals 11/19/23 0818

## 2023-11-22 NOTE — PROGRESS NOTES
Update:  SW attempted f/up with pt and spouse.  No family present at this time.  Pt was sleeping. SW will follow up at a later time.

## 2023-11-22 NOTE — PROGRESS NOTES
[facility] - Cardiac Intensive Care  Heart Transplant  Progress Note    Patient Name: Radha Abbott  MRN: [id number]  Admission Date: 11/9/2023  Hospital Length of Stay: 13 days  Attending Physician: Sandhya Price MD  Primary Care Provider: Vasu Kong MD  Principal Problem:Acute on chronic combined systolic and diastolic CHF, NYHA class 4    Subjective:   Interval History: No acute events overnight. Renal function stable. Filling pressures coming down. Negative fluid balance for last 24hrs. Remains supported on IABP 1:1,  5, and Ketty. Weaning Ketty today. Continuing on Lasix gtt @ 40mg/hr.     Hemodynamics:   CVP 10  Svo2 68  CO 8.5  CI 4.35        Continuous Infusions:   sodium chloride 0.9%      sodium chloride 0.9% 5 mL/hr at 11/22/23 1201    DOBUTamine 5 mcg/kg/min (11/22/23 1201)    furosemide (LASIX) 500 mg in 50 mL infusion (conc: 10 mg/mL) 40 mg/hr (11/22/23 1201)    heparin (porcine) in D5W 16 Units/kg/hr (11/22/23 1201)    nitric oxide gas      sodium chloride 0.9% 250 mL/hr at 11/20/23 2002     Scheduled Meds:   amiodarone  400 mg Oral Daily    aspirin  81 mg Oral Daily    atorvastatin  40 mg Oral Daily    hydrALAZINE  25 mg Oral Q8H    insulin aspart U-100  12 Units Subcutaneous TIDWM    insulin detemir U-100  18 Units Subcutaneous QHS    LIDOcaine  2 patch Transdermal Q24H    magnesium oxide  400 mg Oral Daily    multivitamin  1 tablet Oral Daily    pantoprazole  40 mg Oral Before breakfast    polyethylene glycol  17 g Oral Daily     PRN Meds:0.9%  NaCl infusion (for blood administration), sodium chloride 0.9%, acetaminophen, baclofen, dextrose 10%, dextrose 10%, gabapentin, glucagon (human recombinant), glucose, glucose, heparin (PORCINE), heparin (PORCINE), insulin aspart U-100, ondansetron, senna-docusate 8.6-50 mg, sodium chloride 0.9%, sodium chloride 0.9%    Review of patient's allergies indicates:  No Known Allergies  Objective:     Vital Signs (Most Recent):  Temp: 98.3 °F (36.8  °C) (11/22/23 1101)  Pulse: 81 (11/22/23 1201)  Resp: (!) 21 (11/22/23 1201)  BP: 118/67 (11/22/23 1201)  SpO2: 100 % (11/22/23 1201) Vital Signs (24h Range):  Temp:  [97.5 °F (36.4 °C)-98.4 °F (36.9 °C)] 98.3 °F (36.8 °C)  Pulse:  [70-83] 81  Resp:  [13-28] 21  SpO2:  [97 %-100 %] 100 %  BP: ()/(53-78) 118/67     Patient Vitals for the past 72 hrs (Last 3 readings):   Weight   11/22/23 0400 81.6 kg (180 lb)   11/21/23 1334 75.8 kg (167 lb)   11/21/23 0400 76 kg (167 lb 8.8 oz)       Body mass index is 24.41 kg/m².      Intake/Output Summary (Last 24 hours) at 11/22/2023 1251  Last data filed at 11/22/2023 1201  Gross per 24 hour   Intake 1909.25 ml   Output 3775 ml   Net -1865.75 ml         Hemodynamic Parameters:  PAP: (48-72)/(12-31) 63/25  PAP (Mean):  [26 mmHg-47 mmHg] 39 mmHg    Telemetry: NSR        Physical Exam  HENT:      Head: Normocephalic and atraumatic.   Eyes:      Pupils: Pupils are equal, round, and reactive to light.   Neck:      Comments: Jvp mid neck   Cardiovascular:      Rate and Rhythm: Normal rate and regular rhythm.      Pulses: Normal pulses.      Comments: IABP  Pulmonary:      Effort: Pulmonary effort is normal.      Breath sounds: Normal breath sounds.   Abdominal:      General: Bowel sounds are normal.      Palpations: Abdomen is soft.   Musculoskeletal:      Cervical back: Normal range of motion and neck supple.      Right lower leg: No edema.      Left lower leg: No edema.   Skin:     General: Skin is warm and dry.   Neurological:      General: No focal deficit present.      Mental Status: He is oriented to person, place, and time.            Significant Labs:  CBC:  Recent Labs   Lab 11/21/23  0323 11/21/23  2208 11/22/23  0342 11/22/23  0553   WBC 6.33  --  8.36 7.96   RBC 2.24*  --  2.35* 2.65*   HGB 6.5* 8.0* 6.8* 7.7*   HCT 20.1* 24.1* 20.9* 23.7*     --  391 359   MCV 90  --  89 89   MCH 29.0  --  28.9 29.1   MCHC 32.3  --  32.5 32.5       BNP:  Recent Labs   Lab  "11/18/23  1236   *       CMP:  Recent Labs   Lab 11/20/23  0325 11/21/23  0323 11/21/23  1609 11/22/23  0342   * 147* 207* 129*   CALCIUM 8.9 9.2 9.4 9.3   ALBUMIN 2.9* 3.0*  --  3.2*   PROT 6.5 7.0  --  7.1   * 130* 132* 133*   K 4.2 4.3 4.1 4.0   CO2 24 24 25 29   CL 94* 92* 92* 92*   BUN 88* 94* 89* 87*   CREATININE 2.8* 3.0* 3.0* 2.9*   ALKPHOS 77 82  --  86   ALT 21 33  --  31   AST 20 28  --  24   BILITOT 0.5 0.5  --  0.6        Coagulation:   Recent Labs   Lab 11/15/23  1601 11/16/23  0436 11/20/23  1958 11/21/23  0323 11/22/23  0342   INR 1.1  --   --   --   --    APTT  --    < > 42.2* 51.0* 42.3*    < > = values in this interval not displayed.       LDH:  No results for input(s): "LDH" in the last 72 hours.  Microbiology:  Microbiology Results (last 7 days)       ** No results found for the last 168 hours. **            BMP:   Recent Labs   Lab 11/22/23  0342   *   *   K 4.0   CL 92*   CO2 29   BUN 87*   CREATININE 2.9*   CALCIUM 9.3   MG 2.6       I have reviewed all pertinent labs within the past 24 hours.    Estimated Creatinine Clearance: 29.4 mL/min (A) (based on SCr of 2.9 mg/dL (H)).    Diagnostic Results:  I have reviewed all pertinent imaging results/findings within the past 24 hours.  Assessment and Plan:     63 year old male with hx of CAD s/p 3v CABG (unclear anatomy, 2009), ICM with a recent EF of 15-20% s/p ICD (medtronik 2009), DM2 (a1c 7.7), HTN, HLD, Vfib on amio who presents to the ED with CC of SOB     Pt was recently admitted to [facility] as a transfer.  He was started on a Lasix gtt and did well.  Started on gDMT and discharged home on  5 with plans to follow up in HTS clinic for ongoing transplant evaluation at another facility.  He says that about a few days ago he started to notice LE swelling.  This turned into Nelson and orthopnea.  He says he can't walk to the bathroom without getting SOB.  He also complains of weight gain.  He takes torsemide 20mg BID " at home and was told to trial additional Lasix which he did without any improvement.  He was rx metolazone but has not been filled.  He came to the ED     In the ED he was AF with stable VS on RA.  CBC showing chronic anemia.  CMP notable for acute on chronic CKD with baseline around 2.1 and Cr now 2.6.  BNP elevated.  Lactate neg.  CXR showing pulmonary edema.  I evaluated the pt at the bedside.  Bedside TTE showing CVP >15.  He was subsequently admitted to the CCU for diuresis.     He was continued on his home  and was started on a lasix gtt, which he responded well to overnight (net -1700cc. He feels much better this morning as well. Our transplant coordinators have been working on insurance approval and he is now being transferred to [facility] service for transplant evaluation.     * Acute on chronic combined systolic and diastolic CHF, NYHA class 4  Pt with known ICM with an EF of 25% on home  presenting with decompensated HF.  Not in cardiogenic shock    RHC 11/14: RA 14, PA 70/35, PCWP 32, CO 4.1, CI 2.1.   Repeat RHC 11/20 RA RA  20  PA 60/29 (39)  PCWP 29    CO  4.6 l/min  CI  2.3 l/min/m2  2D echo 11/21 showed EF 15-20%, mild RV dysfunction, mild MR, LVEDD 6.9     - Home  gtt at 5  - home GDMT: entresto 24/26mg BID (on hold 2/2 IVÁN), Hydralazine 25 q8h being continued  - home diuretics: Was taking lasix 40 bid  - s/p IABP placement 11/21. Augmenting 90s at 1:1  - Continue VAD/OHTx workup. (Leaning towards LVAD) On step 4  -Blood type A+  -PT/OT and nutrition   -Continue current support with  5, Ketty 5ppm, Lasix 40mg/hr  -Monitor strict I/Os  -trend CVP, sv02, and PA pressures      PAF (paroxysmal atrial fibrillation)  Known hx of pAF. In sinus rhythm on admission, but 1 run of AF RVR overnight. He spontaneously converted.  - Continue home amiodarone 400mg qd  - Stop home Eliquis and continue heparin gtt while in the hospital.       IVÁN (acute kidney injury)  IVÁN on CKD2. Baseline Cr of 1.7-2.0.    -Currently 2.9  - Trend along with CVP, continue diuresis with Lasix gtt @40mg/hr   - Hold ARNiadrielsto  -Trend BMPs daily     Type 2 diabetes mellitus without complication, without long-term current use of insulin  -A1c 7.6, not insulin dependent  - MDSSI  -diabetic diet ordered/Boost glucose control   - Endocrine following, appreciate assistance with blood glucose management    CAD (coronary artery disease)  -S/p 3vCABG in 2009  - continue home ASA, HI statin    Ventricular tachycardia  Hx VT s/p ICD placement (medtronic 2009)  - Continue home amio 400mg qd    .Uninterrupted Critical Care/Counseling Time (not including procedures): 55 mins       Hugh Duran NP  Heart Transplant  [facility] - Cardiac Intensive Care

## 2023-11-23 LAB
ALBUMIN SERPL BCP-MCNC: 3.2 G/DL (ref 3.5–5.2)
ALLENS TEST: ABNORMAL
ALLENS TEST: ABNORMAL
ALP SERPL-CCNC: 83 U/L (ref 55–135)
ALT SERPL W/O P-5'-P-CCNC: 28 U/L (ref 10–44)
ANION GAP SERPL CALC-SCNC: 12 MMOL/L (ref 8–16)
ANION GAP SERPL CALC-SCNC: 15 MMOL/L (ref 8–16)
APTT PPP: 44.1 SEC (ref 21–32)
AST SERPL-CCNC: 23 U/L (ref 10–40)
BASOPHILS # BLD AUTO: 0.04 K/UL (ref 0–0.2)
BASOPHILS NFR BLD: 0.5 % (ref 0–1.9)
BILIRUB SERPL-MCNC: 0.5 MG/DL (ref 0.1–1)
BUN SERPL-MCNC: 76 MG/DL (ref 8–23)
BUN SERPL-MCNC: 84 MG/DL (ref 8–23)
CALCIUM SERPL-MCNC: 9.4 MG/DL (ref 8.7–10.5)
CALCIUM SERPL-MCNC: 9.5 MG/DL (ref 8.7–10.5)
CHLORIDE SERPL-SCNC: 95 MMOL/L (ref 95–110)
CHLORIDE SERPL-SCNC: 97 MMOL/L (ref 95–110)
CO2 SERPL-SCNC: 26 MMOL/L (ref 23–29)
CO2 SERPL-SCNC: 26 MMOL/L (ref 23–29)
CREAT SERPL-MCNC: 2.4 MG/DL (ref 0.5–1.4)
CREAT SERPL-MCNC: 2.5 MG/DL (ref 0.5–1.4)
DELSYS: ABNORMAL
DIFFERENTIAL METHOD BLD: ABNORMAL
EOSINOPHIL # BLD AUTO: 0.1 K/UL (ref 0–0.5)
EOSINOPHIL NFR BLD: 1.3 % (ref 0–8)
ERYTHROCYTE [DISTWIDTH] IN BLOOD BY AUTOMATED COUNT: 14.9 % (ref 11.5–14.5)
EST. GFR  (NO RACE VARIABLE): 28.5 ML/MIN/1.73 M^2
EST. GFR  (NO RACE VARIABLE): 29.9 ML/MIN/1.73 M^2
GLUCOSE SERPL-MCNC: 124 MG/DL (ref 70–110)
GLUCOSE SERPL-MCNC: 95 MG/DL (ref 70–110)
HCO3 UR-SCNC: 28.3 MMOL/L (ref 24–28)
HCT VFR BLD AUTO: 24.9 % (ref 40–54)
HGB BLD-MCNC: 8.1 G/DL (ref 14–18)
IMM GRANULOCYTES # BLD AUTO: 0.05 K/UL (ref 0–0.04)
IMM GRANULOCYTES NFR BLD AUTO: 0.6 % (ref 0–0.5)
LYMPHOCYTES # BLD AUTO: 1 K/UL (ref 1–4.8)
LYMPHOCYTES NFR BLD: 12.3 % (ref 18–48)
MAGNESIUM SERPL-MCNC: 2.7 MG/DL (ref 1.6–2.6)
MAGNESIUM SERPL-MCNC: 2.8 MG/DL (ref 1.6–2.6)
MCH RBC QN AUTO: 29.1 PG (ref 27–31)
MCHC RBC AUTO-ENTMCNC: 32.5 G/DL (ref 32–36)
MCV RBC AUTO: 90 FL (ref 82–98)
MONOCYTES # BLD AUTO: 0.7 K/UL (ref 0.3–1)
MONOCYTES NFR BLD: 9.3 % (ref 4–15)
NEUTROPHILS # BLD AUTO: 5.9 K/UL (ref 1.8–7.7)
NEUTROPHILS NFR BLD: 76 % (ref 38–73)
NRBC BLD-RTO: 0 /100 WBC
PCO2 BLDA: 38.2 MMHG (ref 35–45)
PH SMN: 7.48 [PH] (ref 7.35–7.45)
PHOSPHATE SERPL-MCNC: 3.9 MG/DL (ref 2.7–4.5)
PLATELET # BLD AUTO: 391 K/UL (ref 150–450)
PMV BLD AUTO: 10.1 FL (ref 9.2–12.9)
PO2 BLDA: 26 MMHG (ref 40–60)
PO2 BLDA: 29 MMHG (ref 40–60)
POC BE: 5 MMOL/L
POC SATURATED O2: 53 % (ref 95–100)
POC SATURATED O2: 59 % (ref 95–100)
POC TCO2: 29 MMOL/L (ref 24–29)
POCT GLUCOSE: 127 MG/DL (ref 70–110)
POCT GLUCOSE: 130 MG/DL (ref 70–110)
POCT GLUCOSE: 191 MG/DL (ref 70–110)
POCT GLUCOSE: 201 MG/DL (ref 70–110)
POTASSIUM SERPL-SCNC: 3.8 MMOL/L (ref 3.5–5.1)
POTASSIUM SERPL-SCNC: 4.1 MMOL/L (ref 3.5–5.1)
PROT SERPL-MCNC: 7.3 G/DL (ref 6–8.4)
RBC # BLD AUTO: 2.78 M/UL (ref 4.6–6.2)
SAMPLE: ABNORMAL
SAMPLE: ABNORMAL
SITE: ABNORMAL
SITE: ABNORMAL
SODIUM SERPL-SCNC: 133 MMOL/L (ref 136–145)
SODIUM SERPL-SCNC: 138 MMOL/L (ref 136–145)
WBC # BLD AUTO: 7.75 K/UL (ref 3.9–12.7)

## 2023-11-23 PROCEDURE — 25000003 PHARM REV CODE 250: Mod: NTX | Performed by: STUDENT IN AN ORGANIZED HEALTH CARE EDUCATION/TRAINING PROGRAM

## 2023-11-23 PROCEDURE — 84100 ASSAY OF PHOSPHORUS: CPT | Mod: NTX | Performed by: STUDENT IN AN ORGANIZED HEALTH CARE EDUCATION/TRAINING PROGRAM

## 2023-11-23 PROCEDURE — 27100094 HC IABP, SET-UP: Mod: NTX

## 2023-11-23 PROCEDURE — 63600175 PHARM REV CODE 636 W HCPCS: Mod: NTX | Performed by: STUDENT IN AN ORGANIZED HEALTH CARE EDUCATION/TRAINING PROGRAM

## 2023-11-23 PROCEDURE — 80053 COMPREHEN METABOLIC PANEL: CPT | Mod: NTX | Performed by: STUDENT IN AN ORGANIZED HEALTH CARE EDUCATION/TRAINING PROGRAM

## 2023-11-23 PROCEDURE — 80048 BASIC METABOLIC PNL TOTAL CA: CPT | Mod: NTX,XB | Performed by: STUDENT IN AN ORGANIZED HEALTH CARE EDUCATION/TRAINING PROGRAM

## 2023-11-23 PROCEDURE — 25000003 PHARM REV CODE 250: Mod: NTX | Performed by: INTERNAL MEDICINE

## 2023-11-23 PROCEDURE — 94761 N-INVAS EAR/PLS OXIMETRY MLT: CPT | Mod: NTX

## 2023-11-23 PROCEDURE — 99900035 HC TECH TIME PER 15 MIN (STAT): Mod: NTX

## 2023-11-23 PROCEDURE — 99291 CRITICAL CARE FIRST HOUR: CPT | Mod: NTX,,, | Performed by: INTERNAL MEDICINE

## 2023-11-23 PROCEDURE — 25000003 PHARM REV CODE 250: Mod: NTX | Performed by: PHYSICIAN ASSISTANT

## 2023-11-23 PROCEDURE — 83735 ASSAY OF MAGNESIUM: CPT | Mod: NTX | Performed by: STUDENT IN AN ORGANIZED HEALTH CARE EDUCATION/TRAINING PROGRAM

## 2023-11-23 PROCEDURE — 25000003 PHARM REV CODE 250: Mod: NTX | Performed by: REGISTERED NURSE

## 2023-11-23 PROCEDURE — 85730 THROMBOPLASTIN TIME PARTIAL: CPT | Mod: NTX | Performed by: STUDENT IN AN ORGANIZED HEALTH CARE EDUCATION/TRAINING PROGRAM

## 2023-11-23 PROCEDURE — 85025 COMPLETE CBC W/AUTO DIFF WBC: CPT | Mod: NTX | Performed by: STUDENT IN AN ORGANIZED HEALTH CARE EDUCATION/TRAINING PROGRAM

## 2023-11-23 PROCEDURE — 20000000 HC ICU ROOM: Mod: NTX

## 2023-11-23 PROCEDURE — 63600175 PHARM REV CODE 636 W HCPCS: Mod: NTX | Performed by: REGISTERED NURSE

## 2023-11-23 PROCEDURE — 83735 ASSAY OF MAGNESIUM: CPT | Mod: 91,NTX | Performed by: STUDENT IN AN ORGANIZED HEALTH CARE EDUCATION/TRAINING PROGRAM

## 2023-11-23 PROCEDURE — 82803 BLOOD GASES ANY COMBINATION: CPT | Mod: NTX

## 2023-11-23 RX ORDER — LACTULOSE 10 G/15ML
10 SOLUTION ORAL ONCE
Status: COMPLETED | OUTPATIENT
Start: 2023-11-23 | End: 2023-11-23

## 2023-11-23 RX ORDER — BISACODYL 10 MG/1
10 SUPPOSITORY RECTAL DAILY
Status: DISCONTINUED | OUTPATIENT
Start: 2023-11-23 | End: 2023-11-28

## 2023-11-23 RX ORDER — POTASSIUM CHLORIDE 20 MEQ/1
40 TABLET, EXTENDED RELEASE ORAL ONCE
Status: COMPLETED | OUTPATIENT
Start: 2023-11-23 | End: 2023-11-23

## 2023-11-23 RX ADMIN — GABAPENTIN 300 MG: 300 CAPSULE ORAL at 08:11

## 2023-11-23 RX ADMIN — ATORVASTATIN CALCIUM 40 MG: 40 TABLET, FILM COATED ORAL at 09:11

## 2023-11-23 RX ADMIN — POLYETHYLENE GLYCOL 3350 17 G: 17 POWDER, FOR SOLUTION ORAL at 09:11

## 2023-11-23 RX ADMIN — HYDRALAZINE HYDROCHLORIDE 25 MG: 25 TABLET, FILM COATED ORAL at 08:11

## 2023-11-23 RX ADMIN — PANTOPRAZOLE SODIUM 40 MG: 40 TABLET, DELAYED RELEASE ORAL at 06:11

## 2023-11-23 RX ADMIN — DOBUTAMINE IN DEXTROSE 5 MCG/KG/MIN: 400 INJECTION, SOLUTION INTRAVENOUS at 06:11

## 2023-11-23 RX ADMIN — INSULIN ASPART 1 UNITS: 100 INJECTION, SOLUTION INTRAVENOUS; SUBCUTANEOUS at 12:11

## 2023-11-23 RX ADMIN — INSULIN ASPART 12 UNITS: 100 INJECTION, SOLUTION INTRAVENOUS; SUBCUTANEOUS at 04:11

## 2023-11-23 RX ADMIN — FUROSEMIDE 40 MG/HR: 10 INJECTION, SOLUTION INTRAMUSCULAR; INTRAVENOUS at 12:11

## 2023-11-23 RX ADMIN — LACTULOSE 10 G: 20 SOLUTION ORAL at 11:11

## 2023-11-23 RX ADMIN — ASPIRIN 81 MG: 81 TABLET, COATED ORAL at 09:11

## 2023-11-23 RX ADMIN — Medication 400 MG: at 09:11

## 2023-11-23 RX ADMIN — THERA TABS 1 TABLET: TAB at 09:11

## 2023-11-23 RX ADMIN — AMIODARONE HYDROCHLORIDE 400 MG: 200 TABLET ORAL at 09:11

## 2023-11-23 RX ADMIN — BACLOFEN 5 MG: 5 TABLET ORAL at 09:11

## 2023-11-23 RX ADMIN — HYDRALAZINE HYDROCHLORIDE 25 MG: 25 TABLET, FILM COATED ORAL at 04:11

## 2023-11-23 RX ADMIN — POTASSIUM CHLORIDE 40 MEQ: 1500 TABLET, EXTENDED RELEASE ORAL at 06:11

## 2023-11-23 RX ADMIN — HEPARIN SODIUM AND DEXTROSE 16 UNITS/KG/HR: 10000; 5 INJECTION INTRAVENOUS at 06:11

## 2023-11-23 RX ADMIN — BISACODYL 10 MG: 10 SUPPOSITORY RECTAL at 11:11

## 2023-11-23 RX ADMIN — INSULIN ASPART 12 UNITS: 100 INJECTION, SOLUTION INTRAVENOUS; SUBCUTANEOUS at 12:11

## 2023-11-23 RX ADMIN — SENNOSIDES AND DOCUSATE SODIUM 2 TABLET: 8.6; 5 TABLET ORAL at 09:11

## 2023-11-23 RX ADMIN — HYDRALAZINE HYDROCHLORIDE 25 MG: 25 TABLET, FILM COATED ORAL at 06:11

## 2023-11-23 RX ADMIN — ACETAMINOPHEN 650 MG: 325 TABLET ORAL at 06:11

## 2023-11-23 RX ADMIN — INSULIN DETEMIR 18 UNITS: 100 INJECTION, SOLUTION SUBCUTANEOUS at 08:11

## 2023-11-23 RX ADMIN — LIDOCAINE 2 PATCH: 50 PATCH CUTANEOUS at 11:11

## 2023-11-23 RX ADMIN — INSULIN ASPART 12 UNITS: 100 INJECTION, SOLUTION INTRAVENOUS; SUBCUTANEOUS at 09:11

## 2023-11-23 RX ADMIN — INSULIN ASPART 2 UNITS: 100 INJECTION, SOLUTION INTRAVENOUS; SUBCUTANEOUS at 09:11

## 2023-11-23 NOTE — ASSESSMENT & PLAN NOTE
IVÁN on CKD2. Baseline Cr of 1.7-2.0.   -Currently 2.5. Improving  - Trend along with CVP, continue diuresis with Lasix gtt @40mg/hr   - Hold ARNi, entresto  -Trend BMPs daily

## 2023-11-23 NOTE — PLAN OF CARE
Problem: Adult Inpatient Plan of Care  Goal: Plan of Care Review  Outcome: Ongoing, Progressing  Goal: Patient-Specific Goal (Individualized)  Outcome: Ongoing, Progressing  Goal: Optimal Comfort and Wellbeing  Outcome: Ongoing, Progressing        Cardiac ICU Care Plan    POC reviewed with Radha Abbott and family. Questions and concerns addressed.  See below and flowsheets for full assessment and VS info.     CVP 9 8   Svo2 59      Neuro:  [place]le Coma Scale  Best Eye Response: 4-->(E4) spontaneous  Best Motor Response: 6-->(M6) obeys commands  Best Verbal Response: 5-->(V5) oriented  [place] Coma Scale Score: 15  Assessment Qualifiers: patient not sedated/intubated  Pupil PERRLA: yes    24 hr Temp:  [97.6 °F (36.4 °C)-98.6 °F (37 °C)]      CV:  Rhythm: normal sinus rhythm   DVT prophylaxis: VTE Required Core Measure: Pharmacological prophylaxis initiated/maintained    CVP (mean): (S) 9 mmHg (11/23/23 0701)    Pulmonary Artery Catheter Assessment  11/20/23 1330 Internal Jugular Left-Current Insertion Depth (cm): 60 cm (11/23/23 0701)  PAP: 69/57 (11/23/23 1601)  SVO2 (%): 54 % (Central Venous) (11/22/23 1901)    IABP Mode: Auto  IABP Rate: 1:1  IABP Trigger: ECG  IABP Augmented Diastolic Pressure: 88          Pulses  Right Radial Pulse: 2+ (normal)  Left Radial Pulse: 2+ (normal)  Right Dorsalis Pedis Pulse: Doppler  Left Dorsalis Pedis Pulse: Doppler  Right Posterior Tibial Pulse: Doppler  Left Posterior Tibial Pulse: Doppler    Resp:  Flow (L/min): 4  Oxygen Concentration (%): 36    GI/:  GI prophylaxis: no  Diet/Nutrition Received: consistent carb/diabetic diet, restrict fluids  Last Bowel Movement: 11/20/23  Voiding Characteristics: voids spontaneously without difficulty  [REMOVED]      Urethral Catheter 11/13/23 1800 Straight-tip 16 Fr.-Reason for Continuing Urinary Catheterization: Critically ill in ICU and requiring hourly monitoring of intake/output   Intake/Output Summary (Last 24 hours) at 11/23/2023  "1725  Last data filed at 11/23/2023 1701  Gross per 24 hour   Intake 1497.68 ml   Output 3650 ml   Net -2152.32 ml        Nutritional Supplement Intake: Quantity 2, Type: Boost    Labs/Accuchecks:  Recent Labs   Lab 11/22/23  0342 11/22/23  0553 11/23/23  0329   WBC 8.36 7.96 7.75   RBC 2.35* 2.65* 2.78*   HGB 6.8* 7.7* 8.1*   HCT 20.9* 23.7* 24.9*    359 391      Recent Labs   Lab 11/21/23  0323 11/22/23  0342 11/23/23  0329   APTT 51.0* 42.3* 44.1*      Recent Labs     11/23/23  0329 11/23/23  1645    133*   K 4.1 3.8   CO2 26 26   CL 97 95   BUN 84* 76*   CREATININE 2.5* 2.4*   ALKPHOS 83  --    ALT 28  --    AST 23  --    BILITOT 0.5  --      No results for input(s): "CPK", "CPKMB", "MB", "TROPONINI" in the last 168 hours.   Recent Labs     11/22/23  0730 11/22/23  1638 11/22/23 2017 11/23/23  0731   PH 7.405 7.431 7.435  --    PCO2 45.3* 41.4 41.2  --    PO2 36* 28* 28* 29*   HCO3 28.4* 27.5 27.7  --    POCSATURATED 68 53 54 59   BE 4* 3* 3*  --        Electrolytes: Electrolytes replaced  Accuchecks: ACHS    Gtts/LDAs:   sodium chloride 0.9%      sodium chloride 0.9% 5 mL/hr at 11/23/23 1701    DOBUTamine 5 mcg/kg/min (11/23/23 1701)    furosemide (LASIX) 500 mg in 50 mL infusion (conc: 10 mg/mL) 40 mg/hr (11/23/23 1701)    heparin (porcine) in D5W 16 Units/kg/hr (11/23/23 1701)    sodium chloride 0.9% 250 mL/hr at 11/20/23 2002       Lines/Drains/Airways       Central Venous Catheter Line  Duration              Introducer Single Lumen 11/20/23 1330 Internal Jugular Left 3 days    Pulmonary Artery Catheter Assessment  11/20/23 1330 Internal Jugular Left 3 days              Drain  Duration                  Urine Pouch  11/20/23 2202 other (see comments) 2 days              Line  Duration                  IABP 11/21/23 1048 7.5 Fr. 40 mL 2 days              Peripheral Intravenous Line  Duration                  Midline Catheter Insertion/Assessment  - Single Lumen 11/14/23 1113 Left brachial vein " 20g x 10cm 9 days                    Skin/Wounds  Bathing/Skin Care: back care;bath, complete;dressed/undressed;foot care;incontinence care;linen changed (11/23/23 0301)  Wounds: No  Wound care consulted: No

## 2023-11-23 NOTE — ASSESSMENT & PLAN NOTE
Pt with known ICM with an EF of 25% on home  presenting with decompensated HF.  Not in cardiogenic shock    RHC 11/14: RA 14, PA 70/35, PCWP 32, CO 4.1, CI 2.1.   Repeat RHC 11/20 RA RA  20  PA 60/29 (39)  PCWP 29    CO  4.6 l/min  CI  2.3 l/min/m2  2D echo 11/21 showed EF 15-20%, mild RV dysfunction, mild MR, LVEDD 6.9     - Home  gtt at 5  - home GDMT: entresto 24/26mg BID (on hold 2/2 IVÁN), Hydralazine 25 q8h being continued  - home diuretics: Was taking lasix 40 bid  - s/p IABP placement 11/21. Augmenting 90s at 1:1  - Continue VAD/OHTx workup. (Leaning towards LVAD) On step 4  -Blood type A+  -PT/OT and nutrition   -Continue current support with  5, Lasix 40mg/hr, IABP 1:1  -Monitor strict I/Os  -trend CVP, sv02, and PA pressures

## 2023-11-23 NOTE — PLAN OF CARE
Problem: Adult Inpatient Plan of Care  Goal: Plan of Care Review  Outcome: Ongoing, Progressing  Flowsheets (Taken 11/22/2023 1839)  Plan of Care Reviewed With:   patient   spouse  Goal: Patient-Specific Goal (Individualized)  Outcome: Ongoing, Progressing  Flowsheets (Taken 11/22/2023 1839)  Anxieties, Fears or Concerns: iabp, mobility  Individualized Care Needs: strength exercises  Goal: Optimal Comfort and Wellbeing  Outcome: Ongoing, Progressing       Cardiac ICU Care Plan    POC reviewed with Radha Abbott and family. Questions and concerns addressed. . See below and flowsheets for full assessment and VS info.     CVP :10 8 7   SVO2: 68 53     Nitric turned off. Patient now on room air       Neuro:  [place] Coma Scale  Best Eye Response: 4-->(E4) spontaneous  Best Motor Response: 6-->(M6) obeys commands  Best Verbal Response: 5-->(V5) oriented  Michelle Coma Scale Score: 15  Assessment Qualifiers: patient not sedated/intubated  Pupil PERRLA: yes    24 hr Temp:  [97.5 °F (36.4 °C)-98.4 °F (36.9 °C)]      CV:  Rhythm: normal sinus rhythm   DVT prophylaxis: VTE Required Core Measure: Pharmacological prophylaxis initiated/maintained    CVP (mean): (S) 7 mmHg (11/22/23 1501)    Pulmonary Artery Catheter Assessment  11/20/23 1330 Internal Jugular Left-Current Insertion Depth (cm): 53 cm (11/22/23 0701)  PAP: 94/71 (11/22/23 1801)  SVO2 (%): (S) 53 % (11/21/23 2002)    IABP Mode: Auto  IABP Rate: 1:1  IABP Trigger: ECG  IABP Augmented Diastolic Pressure: 97          Pulses  Right Radial Pulse: 2+ (normal)  Left Radial Pulse: 2+ (normal)  Right Dorsalis Pedis Pulse: 1+ (weak)  Left Dorsalis Pedis Pulse: 1+ (weak)  Right Posterior Tibial Pulse: 1+ (weak)  Left Posterior Tibial Pulse: 1+ (weak)    Resp:  Flow (L/min): 4  Oxygen Concentration (%): 36    GI/:  GI prophylaxis:no  Diet/Nutrition Received: consistent carb/diabetic diet, restrict fluids  Last Bowel Movement: 11/20/23  Voiding Characteristics: voids  "spontaneously without difficulty  [REMOVED]      Urethral Catheter 11/13/23 1800 Straight-tip 16 Fr.-Reason for Continuing Urinary Catheterization: Critically ill in ICU and requiring hourly monitoring of intake/output   Intake/Output Summary (Last 24 hours) at 11/22/2023 1840  Last data filed at 11/22/2023 1801  Gross per 24 hour   Intake 2082.17 ml   Output 3475 ml   Net -1392.83 ml        Nutritional Supplement Intake: Quantity 2, Type: Boost    Labs/Accuchecks:  Recent Labs   Lab 11/21/23 0323 11/21/23 2208 11/22/23 0342 11/22/23  0553   WBC 6.33  --  8.36 7.96   RBC 2.24*  --  2.35* 2.65*   HGB 6.5* 8.0* 6.8* 7.7*   HCT 20.1* 24.1* 20.9* 23.7*     --  391 359      Recent Labs   Lab 11/20/23 1958 11/21/23 0323 11/22/23 0342   APTT 42.2* 51.0* 42.3*      Recent Labs     11/22/23 0342   *   K 4.0   CO2 29   CL 92*   BUN 87*   CREATININE 2.9*   ALKPHOS 86   ALT 31   AST 24   BILITOT 0.6     No results for input(s): "CPK", "CPKMB", "MB", "TROPONINI" in the last 168 hours.   Recent Labs     11/21/23 1959 11/22/23  0730 11/22/23  1638   PH 7.399 7.405 7.431   PCO2 43.8 45.3* 41.4   PO2 28* 36* 28*   HCO3 27.1 28.4* 27.5   POCSATURATED 53 68 53   BE 2 4* 3*       Electrolytes: N/A - electrolytes WDL  Accuchecks: ACHS    Gtts/LDAs:   sodium chloride 0.9%      sodium chloride 0.9% 5 mL/hr at 11/22/23 1801    DOBUTamine 5 mcg/kg/min (11/22/23 1801)    furosemide (LASIX) 500 mg in 50 mL infusion (conc: 10 mg/mL) 40 mg/hr (11/22/23 1801)    heparin (porcine) in D5W 16 Units/kg/hr (11/22/23 1801)    sodium chloride 0.9% 250 mL/hr at 11/20/23 2002       Lines/Drains/Airways       Central Venous Catheter Line  Duration              Introducer Single Lumen 11/20/23 1330 Internal Jugular Left 2 days    Pulmonary Artery Catheter Assessment  11/20/23 1330 Internal Jugular Left 2 days              Drain  Duration                  Urine Pouch  11/20/23 2202 other (see comments) 1 day              Line  Duration "                  IABP 11/21/23 1048 7.5 Fr. 40 mL 1 day              Peripheral Intravenous Line  Duration                  Midline Catheter Insertion/Assessment  - Single Lumen 11/14/23 1113 Left brachial vein 20g x 10cm 8 days                    Skin/Wounds  Bathing/Skin Care: (S) bath, complete;dressed/undressed;linen changed (11/22/23 0602)  Wounds: No  Wound care consulted: No

## 2023-11-23 NOTE — PLAN OF CARE
Brief Overnight Plan of Care    Updates:      Infusions:   sodium chloride 0.9%      sodium chloride 0.9% 5 mL/hr at 11/22/23 1801    DOBUTamine 5 mcg/kg/min (11/22/23 1801)    furosemide (LASIX) 500 mg in 50 mL infusion (conc: 10 mg/mL) 40 mg/hr (11/22/23 1801)    heparin (porcine) in D5W 16 Units/kg/hr (11/22/23 1801)    sodium chloride 0.9% 250 mL/hr at 11/20/23 2002       7PM Hemodynamics:  Parameters    CVP 8   SVO2 54   CO 6.2   CI 3.1        [place]:  PA- poor (blunted) PA waveform, poor wedge waveform, too.     No intake/output data recorded.      Intake/Output Summary (Last 24 hours) at 11/22/2023 2214  Last data filed at 11/22/2023 1801  Gross per 24 hour   Intake 1326.82 ml   Output 2875 ml   Net -1548.18 ml          Plan:  - No changes in current plan of care      Discussed with staff    Connor Gillies, DO  PGY-IV, Cardiology

## 2023-11-23 NOTE — PLAN OF CARE
Cardiac ICU Care Plan    NAEON   CVPs: 8, 8, 9,   SvO2 54  Continuing to diurese, no IABP alarms overnight     POC reviewed with Radha Abbott and family. Questions and concerns addressed.  Pt progressing toward goals. Will continue to monitor. See below and flowsheets for full assessment and VS info.       Neuro:  [place] Coma Scale  Best Eye Response: 4-->(E4) spontaneous  Best Motor Response: 6-->(M6) obeys commands  Best Verbal Response: 5-->(V5) oriented  [place] Coma Scale Score: 15  Assessment Qualifiers: patient not sedated/intubated  Pupil PERRLA: yes    24 hr Temp:  [97.7 °F (36.5 °C)-98.6 °F (37 °C)]      CV:  Rhythm: normal sinus rhythm   DVT prophylaxis: VTE Required Core Measure: Pharmacological prophylaxis initiated/maintained    CVP (mean): 9 mmHg (11/23/23 0301)    Pulmonary Artery Catheter Assessment  11/20/23 1330 Internal Jugular Left-Current Insertion Depth (cm): 60 cm (11/22/23 1901)  PAP: 94/71 (11/22/23 1801)  SVO2 (%): 54 % (Central Venous) (11/22/23 1901)    IABP Mode: Auto  IABP Rate: 1:1  IABP Trigger: ECG  IABP Augmented Diastolic Pressure: 97          Pulses  Right Radial Pulse: 2+ (normal)  Left Radial Pulse: 2+ (normal)  Right Dorsalis Pedis Pulse: Doppler  Left Dorsalis Pedis Pulse: Doppler  Right Posterior Tibial Pulse: Doppler  Left Posterior Tibial Pulse: Doppler    Resp:  Flow (L/min): 4  Oxygen Concentration (%): 36    GI/:  GI prophylaxis: yes  Diet/Nutrition Received: consistent carb/diabetic diet, restrict fluids (2L FR)  Last Bowel Movement: 11/20/23  Voiding Characteristics: voids spontaneously without difficulty (pouch)   Intake/Output Summary (Last 24 hours) at 11/23/2023 0649  Last data filed at 11/23/2023 0601  Gross per 24 hour   Intake 1316.58 ml   Output 4625 ml   Net -3308.42 ml        Nutritional Supplement Intake: Quantity 0, Type: Boost    Labs/Accuchecks:  Recent Labs   Lab 11/22/23  0342 11/22/23  0553 11/23/23  0329   WBC 8.36 7.96 7.75   RBC 2.35* 2.65* 2.78*  "  HGB 6.8* 7.7* 8.1*   HCT 20.9* 23.7* 24.9*    359 391      Recent Labs   Lab 11/21/23  0323 11/22/23  0342 11/23/23  0329   APTT 51.0* 42.3* 44.1*      Recent Labs     11/23/23  0329      K 4.1   CO2 26   CL 97   BUN 84*   CREATININE 2.5*   ALKPHOS 83   ALT 28   AST 23   BILITOT 0.5     No results for input(s): "CPK", "CPKMB", "MB", "TROPONINI" in the last 168 hours.   Recent Labs     11/22/23  0730 11/22/23  1638 11/22/23 2017   PH 7.405 7.431 7.435   PCO2 45.3* 41.4 41.2   PO2 36* 28* 28*   HCO3 28.4* 27.5 27.7   POCSATURATED 68 53 54   BE 4* 3* 3*       Electrolytes: No replacement orders  Accuchecks: ACHS    Gtts/LDAs:   sodium chloride 0.9%      sodium chloride 0.9% 5 mL/hr at 11/23/23 0601    DOBUTamine 5 mcg/kg/min (11/23/23 0601)    furosemide (LASIX) 500 mg in 50 mL infusion (conc: 10 mg/mL) 40 mg/hr (11/23/23 0601)    heparin (porcine) in D5W 16 Units/kg/hr (11/23/23 0601)    sodium chloride 0.9% 250 mL/hr at 11/20/23 2002       Lines/Drains/Airways       Central Venous Catheter Line  Duration              Introducer Single Lumen 11/20/23 1330 Internal Jugular Left 2 days    Pulmonary Artery Catheter Assessment  11/20/23 1330 Internal Jugular Left 2 days              Drain  Duration                  Urine Pouch  11/20/23 2202 other (see comments) 2 days              Line  Duration                  IABP 11/21/23 1048 7.5 Fr. 40 mL 1 day              Peripheral Intravenous Line  Duration                  Midline Catheter Insertion/Assessment  - Single Lumen 11/14/23 1113 Left brachial vein 20g x 10cm 8 days                    Skin/Wounds  Bathing/Skin Care: back care;bath, complete;dressed/undressed;foot care;incontinence care;linen changed (11/23/23 0301)  Wounds: No  Wound care consulted: No     Problem: Adult Inpatient Plan of Care  Goal: Plan of Care Review  Outcome: Ongoing, Progressing     "

## 2023-11-23 NOTE — CARE UPDATE
Care Update:     No acute events overnight. Patient on the CICU in room [id number] A. Blood glucose stable. BG at goal on current insulin regimen (SSI, prandial, and basal insulin ). Steroid use- None. 2 Days Post-Op  Renal function- Creatinine 2.5  Vasopressors-  None       Diet diabetic 2800 Calorie; Fluid - 2000mL; Standard Tray     POCT Glucose   Date Value Ref Range Status   11/22/2023 136 (H) 70 - 110 mg/dL Final   11/22/2023 136 (H) 70 - 110 mg/dL Final   11/22/2023 135 (H) 70 - 110 mg/dL Final   11/22/2023 170 (H) 70 - 110 mg/dL Final   11/22/2023 173 (H) 70 - 110 mg/dL Final   11/21/2023 218 (H) 70 - 110 mg/dL Final   11/21/2023 205 (H) 70 - 110 mg/dL Final   11/21/2023 173 (H) 70 - 110 mg/dL Final   11/20/2023 240 (H) 70 - 110 mg/dL Final   11/20/2023 321 (H) 70 - 110 mg/dL Final   11/20/2023 365 (H) 70 - 110 mg/dL Final   11/20/2023 242 (H) 70 - 110 mg/dL Final     Lab Results   Component Value Date    HGBA1C 7.6 (H) 10/12/2023       Endocrine  Type 2 diabetes mellitus without complication, without long-term current use of insulin  Endocrinology consulted for BG management.   BG goal 140-180  Temporarily NPO, if prolonged will adjust orders.      WBD 0.6 units/kg/day (60/40 split)  - Levemir (Insulin Detemir) 18 units nightly   - Novolog (Insulin Aspart) 12 units TIDWM and prn for BG excursions [facility] SSI (150/25)  - BG checks AC/HS  - Hypoglycemia protocol in place      ** Please notify Endocrine for any change and/or advance in diet**  ** Please call Endocrine for any BG related issues **    Discharge Planning:   TBD. Please notify endocrinology prior to discharge.      Erasmo Parrish DNP, FNP-C  Department of Endocrinology  Inpatient Glycemic Management

## 2023-11-23 NOTE — SUBJECTIVE & OBJECTIVE
Interval History: No acute events overnight. This morning bleeding noted from IABP insertion site. Lidocaine epi injection was placed and bleeding stopped. No acute complaints. IABP position appears to be 5cm above the [place] however radial pulses present and patient having no issues. Will leave IABP as is for now.     Hemodynamics:   CVP 9  Svo2 59  CO 5.89  CI 2.99    PA 39/30      Continuous Infusions:   sodium chloride 0.9%      sodium chloride 0.9% 5 mL/hr at 11/23/23 0801    DOBUTamine 5 mcg/kg/min (11/23/23 0801)    furosemide (LASIX) 500 mg in 50 mL infusion (conc: 10 mg/mL) 40 mg/hr (11/23/23 0801)    heparin (porcine) in D5W 16 Units/kg/hr (11/23/23 0801)    sodium chloride 0.9% 250 mL/hr at 11/20/23 2002     Scheduled Meds:   amiodarone  400 mg Oral Daily    aspirin  81 mg Oral Daily    atorvastatin  40 mg Oral Daily    bisacodyL  10 mg Rectal Daily    hydrALAZINE  25 mg Oral Q8H    insulin aspart U-100  12 Units Subcutaneous TIDWM    insulin detemir U-100  18 Units Subcutaneous QHS    lactulose  10 g Oral Once    LIDOcaine  2 patch Transdermal Q24H    magnesium oxide  400 mg Oral Daily    multivitamin  1 tablet Oral Daily    pantoprazole  40 mg Oral Before breakfast    polyethylene glycol  17 g Oral Daily     PRN Meds:0.9%  NaCl infusion (for blood administration), sodium chloride 0.9%, acetaminophen, baclofen, dextrose 10%, dextrose 10%, gabapentin, glucagon (human recombinant), glucose, glucose, heparin (PORCINE), heparin (PORCINE), insulin aspart U-100, ondansetron, senna-docusate 8.6-50 mg, sodium chloride 0.9%, sodium chloride 0.9%    Review of patient's allergies indicates:  No Known Allergies  Objective:     Vital Signs (Most Recent):  Temp: 97.6 °F (36.4 °C) (11/23/23 0701)  Pulse: 80 (11/23/23 0801)  Resp: 15 (11/23/23 0801)  BP: (!) 104/58 (11/23/23 0801)  SpO2: 100 % (11/23/23 0801) Vital Signs (24h Range):  Temp:  [97.6 °F (36.4 °C)-98.6 °F (37 °C)] 97.6 °F (36.4 °C)  Pulse:  [75-86]  80  Resp:  [12-41] 15  SpO2:  [95 %-100 %] 100 %  BP: ()/(28-69) 104/58     Patient Vitals for the past 72 hrs (Last 3 readings):   Weight   11/23/23 0400 73.9 kg (163 lb)   11/22/23 0400 81.6 kg (180 lb)   11/21/23 1334 75.8 kg (167 lb)       Body mass index is 22.11 kg/m².      Intake/Output Summary (Last 24 hours) at 11/23/2023 1121  Last data filed at 11/23/2023 0801  Gross per 24 hour   Intake 815.1 ml   Output 3500 ml   Net -2684.9 ml         Hemodynamic Parameters:  PAP: (40-94)/(19-71) 40/31  PAP (Mean):  [34 mmHg-82 mmHg] 36 mmHg    Telemetry: NSR        Physical Exam  HENT:      Head: Normocephalic and atraumatic.   Eyes:      Pupils: Pupils are equal, round, and reactive to light.   Neck:      Vascular: JVD present.   Cardiovascular:      Rate and Rhythm: Normal rate and regular rhythm.      Pulses: Normal pulses.      Comments: IABP. No bleeding noted at groin site.   Pulmonary:      Effort: Pulmonary effort is normal.      Breath sounds: Normal breath sounds.   Abdominal:      General: Bowel sounds are normal.      Palpations: Abdomen is soft.   Musculoskeletal:      Cervical back: Normal range of motion and neck supple.      Right lower leg: No edema.      Left lower leg: No edema.   Skin:     General: Skin is warm and dry.   Neurological:      General: No focal deficit present.      Mental Status: He is oriented to person, place, and time.            Significant Labs:  CBC:  Recent Labs   Lab 11/22/23  0342 11/22/23  0553 11/23/23  0329   WBC 8.36 7.96 7.75   RBC 2.35* 2.65* 2.78*   HGB 6.8* 7.7* 8.1*   HCT 20.9* 23.7* 24.9*    359 391   MCV 89 89 90   MCH 28.9 29.1 29.1   MCHC 32.5 32.5 32.5       BNP:  Recent Labs   Lab 11/18/23  1236   *       CMP:  Recent Labs   Lab 11/21/23  0323 11/21/23  1609 11/22/23  0342 11/23/23  0329   * 207* 129* 95   CALCIUM 9.2 9.4 9.3 9.5   ALBUMIN 3.0*  --  3.2* 3.2*   PROT 7.0  --  7.1 7.3   * 132* 133* 138   K 4.3 4.1 4.0 4.1   CO2  "24 25 29 26   CL 92* 92* 92* 97   BUN 94* 89* 87* 84*   CREATININE 3.0* 3.0* 2.9* 2.5*   ALKPHOS 82  --  86 83   ALT 33  --  31 28   AST 28  --  24 23   BILITOT 0.5  --  0.6 0.5        Coagulation:   Recent Labs   Lab 11/21/23  0323 11/22/23  0342 11/23/23  0329   APTT 51.0* 42.3* 44.1*       LDH:  No results for input(s): "LDH" in the last 72 hours.  Microbiology:  Microbiology Results (last 7 days)       ** No results found for the last 168 hours. **            BMP:   Recent Labs   Lab 11/23/23 0329   GLU 95      K 4.1   CL 97   CO2 26   BUN 84*   CREATININE 2.5*   CALCIUM 9.5   MG 2.7*       I have reviewed all pertinent labs within the past 24 hours.    Estimated Creatinine Clearance: 32.4 mL/min (A) (based on SCr of 2.5 mg/dL (H)).    Diagnostic Results:  I have reviewed all pertinent imaging results/findings within the past 24 hours.  "

## 2023-11-23 NOTE — PROGRESS NOTES
[facility] - Cardiac Intensive Care  Heart Transplant  Progress Note    Patient Name: Radha Abbott  MRN: [id number]  Admission Date: 11/9/2023  Hospital Length of Stay: 14 days  Attending Physician: Sandhya Price MD  Primary Care Provider: Vasu Kong MD  Principal Problem:Acute on chronic combined systolic and diastolic CHF, NYHA class 4    Subjective:   Interval History: No acute events overnight. This morning bleeding noted from IABP insertion site. Lidocaine epi injection was placed and bleeding stopped. No acute complaints. IABP position appears to be 5cm above the [place] however radial pulses present and patient having no issues. Will leave IABP as is for now.     Hemodynamics:   CVP 9  Svo2 59  CO 5.89  CI 2.99    PA 39/30      Continuous Infusions:   sodium chloride 0.9%      sodium chloride 0.9% 5 mL/hr at 11/23/23 0801    DOBUTamine 5 mcg/kg/min (11/23/23 0801)    furosemide (LASIX) 500 mg in 50 mL infusion (conc: 10 mg/mL) 40 mg/hr (11/23/23 0801)    heparin (porcine) in D5W 16 Units/kg/hr (11/23/23 0801)    sodium chloride 0.9% 250 mL/hr at 11/20/23 2002     Scheduled Meds:   amiodarone  400 mg Oral Daily    aspirin  81 mg Oral Daily    atorvastatin  40 mg Oral Daily    bisacodyL  10 mg Rectal Daily    hydrALAZINE  25 mg Oral Q8H    insulin aspart U-100  12 Units Subcutaneous TIDWM    insulin detemir U-100  18 Units Subcutaneous QHS    lactulose  10 g Oral Once    LIDOcaine  2 patch Transdermal Q24H    magnesium oxide  400 mg Oral Daily    multivitamin  1 tablet Oral Daily    pantoprazole  40 mg Oral Before breakfast    polyethylene glycol  17 g Oral Daily     PRN Meds:0.9%  NaCl infusion (for blood administration), sodium chloride 0.9%, acetaminophen, baclofen, dextrose 10%, dextrose 10%, gabapentin, glucagon (human recombinant), glucose, glucose, heparin (PORCINE), heparin (PORCINE), insulin aspart U-100, ondansetron, senna-docusate 8.6-50 mg, sodium chloride 0.9%, sodium chloride  0.9%    Review of patient's allergies indicates:  No Known Allergies  Objective:     Vital Signs (Most Recent):  Temp: 97.6 °F (36.4 °C) (11/23/23 0701)  Pulse: 80 (11/23/23 0801)  Resp: 15 (11/23/23 0801)  BP: (!) 104/58 (11/23/23 0801)  SpO2: 100 % (11/23/23 0801) Vital Signs (24h Range):  Temp:  [97.6 °F (36.4 °C)-98.6 °F (37 °C)] 97.6 °F (36.4 °C)  Pulse:  [75-86] 80  Resp:  [12-41] 15  SpO2:  [95 %-100 %] 100 %  BP: ()/(28-69) 104/58     Patient Vitals for the past 72 hrs (Last 3 readings):   Weight   11/23/23 0400 73.9 kg (163 lb)   11/22/23 0400 81.6 kg (180 lb)   11/21/23 1334 75.8 kg (167 lb)       Body mass index is 22.11 kg/m².      Intake/Output Summary (Last 24 hours) at 11/23/2023 1121  Last data filed at 11/23/2023 0801  Gross per 24 hour   Intake 815.1 ml   Output 3500 ml   Net -2684.9 ml         Hemodynamic Parameters:  PAP: (40-94)/(19-71) 40/31  PAP (Mean):  [34 mmHg-82 mmHg] 36 mmHg    Telemetry: NSR        Physical Exam  HENT:      Head: Normocephalic and atraumatic.   Eyes:      Pupils: Pupils are equal, round, and reactive to light.   Neck:      Vascular: JVD present.   Cardiovascular:      Rate and Rhythm: Normal rate and regular rhythm.      Pulses: Normal pulses.      Comments: IABP. No bleeding noted at groin site.   Pulmonary:      Effort: Pulmonary effort is normal.      Breath sounds: Normal breath sounds.   Abdominal:      General: Bowel sounds are normal.      Palpations: Abdomen is soft.   Musculoskeletal:      Cervical back: Normal range of motion and neck supple.      Right lower leg: No edema.      Left lower leg: No edema.   Skin:     General: Skin is warm and dry.   Neurological:      General: No focal deficit present.      Mental Status: He is oriented to person, place, and time.            Significant Labs:  CBC:  Recent Labs   Lab 11/22/23  0342 11/22/23  0553 11/23/23  0329   WBC 8.36 7.96 7.75   RBC 2.35* 2.65* 2.78*   HGB 6.8* 7.7* 8.1*   HCT 20.9* 23.7* 24.9*   PLT  "391 359 391   MCV 89 89 90   MCH 28.9 29.1 29.1   MCHC 32.5 32.5 32.5       BNP:  Recent Labs   Lab 11/18/23  1236   *       CMP:  Recent Labs   Lab 11/21/23  0323 11/21/23  1609 11/22/23 0342 11/23/23 0329   * 207* 129* 95   CALCIUM 9.2 9.4 9.3 9.5   ALBUMIN 3.0*  --  3.2* 3.2*   PROT 7.0  --  7.1 7.3   * 132* 133* 138   K 4.3 4.1 4.0 4.1   CO2 24 25 29 26   CL 92* 92* 92* 97   BUN 94* 89* 87* 84*   CREATININE 3.0* 3.0* 2.9* 2.5*   ALKPHOS 82  --  86 83   ALT 33  --  31 28   AST 28  --  24 23   BILITOT 0.5  --  0.6 0.5        Coagulation:   Recent Labs   Lab 11/21/23 0323 11/22/23 0342 11/23/23 0329   APTT 51.0* 42.3* 44.1*       LDH:  No results for input(s): "LDH" in the last 72 hours.  Microbiology:  Microbiology Results (last 7 days)       ** No results found for the last 168 hours. **            BMP:   Recent Labs   Lab 11/23/23  0329   GLU 95      K 4.1   CL 97   CO2 26   BUN 84*   CREATININE 2.5*   CALCIUM 9.5   MG 2.7*       I have reviewed all pertinent labs within the past 24 hours.    Estimated Creatinine Clearance: 32.4 mL/min (A) (based on SCr of 2.5 mg/dL (H)).    Diagnostic Results:  I have reviewed all pertinent imaging results/findings within the past 24 hours.  Assessment and Plan:     63 year old male with hx of CAD s/p 3v CABG (unclear anatomy, 2009), ICM with a recent EF of 15-20% s/p ICD (medtronik 2009), DM2 (a1c 7.7), HTN, HLD, Vfib on amio who presents to the ED with CC of SOB     [place] [facility].  He was started on a Lasix gtt and did well.  Started on gDMT and discharged home on  5 with plans to follow up in HTS clinic for ongoing transplant evaluation at another facility.  He says that about a few days ago he started to notice LE swelling.  This turned into Nelson and orthopnea.  He says he can't walk to the bathroom without getting SOB.  He also complains of weight gain.  He takes torsemide 20mg BID at home and was told to " trial additional Lasix which he did without any improvement.  He was rx metolazone but has not been filled.  He came to the ED     In the ED he was AF with stable VS on RA.  CBC showing chronic anemia.  CMP notable for acute on chronic CKD with baseline around 2.1 and Cr now 2.6.  BNP elevated.  Lactate neg.  CXR showing pulmonary edema.  I evaluated the pt at the bedside.  Bedside TTE showing CVP >15.  He was subsequently admitted to the CCU for diuresis.     He was continued on his home  and was started on a lasix gtt, which he responded well to overnight (net -1700cc. He feels much better this morning as well. Our transplant coordinators have been working on insurance approval and he is now being transferred to [facility] service for transplant evaluation.     * Acute on chronic combined systolic and diastolic CHF, NYHA class 4  Pt with known ICM with an EF of 25% on home  presenting with decompensated HF.  Not in cardiogenic shock    RHC 11/14: RA 14, PA 70/35, PCWP 32, CO 4.1, CI 2.1.   Repeat RHC 11/20 RA RA  20  PA 60/29 (39)  PCWP 29    CO  4.6 l/min  CI  2.3 l/min/m2  2D echo 11/21 showed EF 15-20%, mild RV dysfunction, mild MR, LVEDD 6.9     - Home  gtt at 5  - home GDMT: entresto 24/26mg BID (on hold 2/2 IVÁN), Hydralazine 25 q8h being continued  - home diuretics: Was taking lasix 40 bid  - s/p IABP placement 11/21. Augmenting 90s at 1:1  - Continue VAD/OHTx workup. (Leaning towards LVAD) On step 4  -Blood type A+  -PT/OT and nutrition   -Continue current support with  5, Lasix 40mg/hr, IABP 1:1  -Monitor strict I/Os  -trend CVP, sv02, and PA pressures          PAF (paroxysmal atrial fibrillation)  Known hx of pAF. In sinus rhythm on admission, but 1 run of AF RVR overnight. He spontaneously converted.  - Continue home amiodarone 400mg qd  - Stop home Eliquis and continue heparin gtt while in the hospital.       IVÁN (acute kidney injury)  IVÁN on CKD2. Baseline Cr of 1.7-2.0.   -Currently 2.5.  Improving  - Trend along with CVP, continue diuresis with Lasix gtt @40mg/hr   - Hold ARNi entresto  -Trend BMPs daily     Type 2 diabetes mellitus without complication, without long-term current use of insulin  -A1c 7.6, not insulin dependent  - MDSSI  -diabetic diet ordered/Boost glucose control   - Endocrine following, appreciate assistance with blood glucose management    CAD (coronary artery disease)  -S/p 3vCABG in 2009  - continue home ASA, HI statin    Ventricular tachycardia  Hx VT s/p ICD placement (medtronic 2009)  - Continue home amio 400mg qd        Angela Shen MD  Heart Transplant  [facility] - Cardiac Intensive Care

## 2023-11-24 LAB
ABO + RH BLD: ABNORMAL
ALBUMIN SERPL BCP-MCNC: 3.2 G/DL (ref 3.5–5.2)
ALLENS TEST: ABNORMAL
ALLENS TEST: ABNORMAL
ALP SERPL-CCNC: 86 U/L (ref 55–135)
ALT SERPL W/O P-5'-P-CCNC: 26 U/L (ref 10–44)
ANION GAP SERPL CALC-SCNC: 13 MMOL/L (ref 8–16)
ANION GAP SERPL CALC-SCNC: 16 MMOL/L (ref 8–16)
APTT PPP: 49.2 SEC (ref 21–32)
ASCENDING AORTA: 2.75 CM
AST SERPL-CCNC: 24 U/L (ref 10–40)
AV INDEX (PROSTH): 0.73
AV MEAN GRADIENT: 3 MMHG
AV PEAK GRADIENT: 5 MMHG
AV VALVE AREA BY VELOCITY RATIO: 3.12 CM²
AV VALVE AREA: 2.78 CM²
AV VELOCITY RATIO: 0.82
BASOPHILS # BLD AUTO: 0.03 K/UL (ref 0–0.2)
BASOPHILS NFR BLD: 0.3 % (ref 0–1.9)
BILIRUB SERPL-MCNC: 0.8 MG/DL (ref 0.1–1)
BLD GP AB SCN CELLS X3 SERPL QL: ABNORMAL
BLD GP AB SCN CELLS X3 SERPL QL: NORMAL
BSA FOR ECHO PROCEDURE: 1.97 M2
BUN SERPL-MCNC: 66 MG/DL (ref 8–23)
BUN SERPL-MCNC: 67 MG/DL (ref 8–23)
CALCIUM SERPL-MCNC: 9.8 MG/DL (ref 8.7–10.5)
CALCIUM SERPL-MCNC: 9.8 MG/DL (ref 8.7–10.5)
CHLORIDE SERPL-SCNC: 96 MMOL/L (ref 95–110)
CHLORIDE SERPL-SCNC: 96 MMOL/L (ref 95–110)
CO2 SERPL-SCNC: 24 MMOL/L (ref 23–29)
CO2 SERPL-SCNC: 25 MMOL/L (ref 23–29)
CREAT SERPL-MCNC: 2.3 MG/DL (ref 0.5–1.4)
CREAT SERPL-MCNC: 2.5 MG/DL (ref 0.5–1.4)
CV ECHO LV RWT: 0.23 CM
DELSYS: ABNORMAL
DIFFERENTIAL METHOD BLD: ABNORMAL
DOP CALC AO PEAK VEL: 1.17 M/S
DOP CALC AO VTI: 18.65 CM
DOP CALC LVOT AREA: 3.8 CM2
DOP CALC LVOT DIAMETER: 2.2 CM
DOP CALC LVOT PEAK VEL: 0.96 M/S
DOP CALC LVOT STROKE VOLUME: 51.86 CM3
DOP CALCLVOT PEAK VEL VTI: 13.65 CM
E/E' RATIO: 19.5 M/S
ECHO LV POSTERIOR WALL: 0.75 CM (ref 0.6–1.1)
EJECTION FRACTION: 10 %
EOSINOPHIL # BLD AUTO: 0 K/UL (ref 0–0.5)
EOSINOPHIL NFR BLD: 0.2 % (ref 0–8)
ERYTHROCYTE [DISTWIDTH] IN BLOOD BY AUTOMATED COUNT: 14.9 % (ref 11.5–14.5)
EST. GFR  (NO RACE VARIABLE): 28.5 ML/MIN/1.73 M^2
EST. GFR  (NO RACE VARIABLE): 31.5 ML/MIN/1.73 M^2
FRACTIONAL SHORTENING: 2 % (ref 28–44)
GLUCOSE SERPL-MCNC: 144 MG/DL (ref 70–110)
GLUCOSE SERPL-MCNC: 177 MG/DL (ref 70–110)
HCO3 UR-SCNC: 27.7 MMOL/L (ref 24–28)
HCT VFR BLD AUTO: 28.6 % (ref 40–54)
HGB BLD-MCNC: 9 G/DL (ref 14–18)
IMM GRANULOCYTES # BLD AUTO: 0.06 K/UL (ref 0–0.04)
IMM GRANULOCYTES NFR BLD AUTO: 0.5 % (ref 0–0.5)
INTERVENTRICULAR SEPTUM: 0.5 CM (ref 0.6–1.1)
LA MAJOR: 5.44 CM
LA MINOR: 5.57 CM
LA WIDTH: 4.53 CM
LEFT ATRIUM SIZE: 4.55 CM
LEFT ATRIUM VOLUME INDEX MOD: 33.2 ML/M2
LEFT ATRIUM VOLUME INDEX: 48.7 ML/M2
LEFT ATRIUM VOLUME MOD: 65.68 CM3
LEFT ATRIUM VOLUME: 96.43 CM3
LEFT INTERNAL DIMENSION IN SYSTOLE: 6.3 CM (ref 2.1–4)
LEFT VENTRICLE DIASTOLIC VOLUME INDEX: 106.19 ML/M2
LEFT VENTRICLE DIASTOLIC VOLUME: 210.26 ML
LEFT VENTRICLE MASS INDEX: 79 G/M2
LEFT VENTRICLE SYSTOLIC VOLUME INDEX: 101.5 ML/M2
LEFT VENTRICLE SYSTOLIC VOLUME: 200.9 ML
LEFT VENTRICULAR INTERNAL DIMENSION IN DIASTOLE: 6.42 CM (ref 3.5–6)
LEFT VENTRICULAR MASS: 155.86 G
LV LATERAL E/E' RATIO: 16.71 M/S
LV SEPTAL E/E' RATIO: 23.4 M/S
LYMPHOCYTES # BLD AUTO: 0.5 K/UL (ref 1–4.8)
LYMPHOCYTES NFR BLD: 4.8 % (ref 18–48)
MAGNESIUM SERPL-MCNC: 2.6 MG/DL (ref 1.6–2.6)
MCH RBC QN AUTO: 28.5 PG (ref 27–31)
MCHC RBC AUTO-ENTMCNC: 31.5 G/DL (ref 32–36)
MCV RBC AUTO: 91 FL (ref 82–98)
MONOCYTES # BLD AUTO: 0.7 K/UL (ref 0.3–1)
MONOCYTES NFR BLD: 6.2 % (ref 4–15)
MV PEAK E VEL: 1.17 M/S
NEUTROPHILS # BLD AUTO: 9.7 K/UL (ref 1.8–7.7)
NEUTROPHILS NFR BLD: 88 % (ref 38–73)
NRBC BLD-RTO: 0 /100 WBC
PCO2 BLDA: 38.4 MMHG (ref 35–45)
PH SMN: 7.47 [PH] (ref 7.35–7.45)
PHOSPHATE SERPL-MCNC: 4.5 MG/DL (ref 2.7–4.5)
PISA MRMAX VEL: 0.04 M/S
PISA TR MAX VEL: 2.46 M/S
PLATELET # BLD AUTO: 392 K/UL (ref 150–450)
PMV BLD AUTO: 10.4 FL (ref 9.2–12.9)
PO2 BLDA: 28 MMHG (ref 40–60)
PO2 BLDA: 29 MMHG (ref 40–60)
POC BE: 4 MMOL/L
POC SATURATED O2: 57 % (ref 95–100)
POC SATURATED O2: 63 % (ref 95–100)
POC TCO2: 29 MMOL/L (ref 24–29)
POCT GLUCOSE: 171 MG/DL (ref 70–110)
POCT GLUCOSE: 172 MG/DL (ref 70–110)
POCT GLUCOSE: 191 MG/DL (ref 70–110)
POCT GLUCOSE: 247 MG/DL (ref 70–110)
POTASSIUM SERPL-SCNC: 4.3 MMOL/L (ref 3.5–5.1)
POTASSIUM SERPL-SCNC: 4.6 MMOL/L (ref 3.5–5.1)
PROT SERPL-MCNC: 7.6 G/DL (ref 6–8.4)
RA MAJOR: 4.88 CM
RA PRESSURE ESTIMATED: 8 MMHG
RA WIDTH: 4.4 CM
RBC # BLD AUTO: 3.16 M/UL (ref 4.6–6.2)
RIGHT VENTRICULAR END-DIASTOLIC DIMENSION: 4.7 CM
RV TB RVSP: 10 MMHG
SAMPLE: ABNORMAL
SAMPLE: ABNORMAL
SINUS: 2.9 CM
SITE: ABNORMAL
SITE: ABNORMAL
SODIUM SERPL-SCNC: 134 MMOL/L (ref 136–145)
SODIUM SERPL-SCNC: 136 MMOL/L (ref 136–145)
SPECIMEN OUTDATE: ABNORMAL
STJ: 2.58 CM
TDI LATERAL: 0.07 M/S
TDI SEPTAL: 0.05 M/S
TDI: 0.06 M/S
TR MAX PG: 24 MMHG
TRICUSPID ANNULAR PLANE SYSTOLIC EXCURSION: 0.91 CM
TV REST PULMONARY ARTERY PRESSURE: 32 MMHG
WBC # BLD AUTO: 10.98 K/UL (ref 3.9–12.7)
Z-SCORE OF LEFT VENTRICULAR DIMENSION IN END DIASTOLE: 1.16
Z-SCORE OF LEFT VENTRICULAR DIMENSION IN END SYSTOLE: 4.62

## 2023-11-24 PROCEDURE — 80048 BASIC METABOLIC PNL TOTAL CA: CPT | Mod: NTX,XB | Performed by: PHYSICIAN ASSISTANT

## 2023-11-24 PROCEDURE — 83735 ASSAY OF MAGNESIUM: CPT | Mod: NTX | Performed by: STUDENT IN AN ORGANIZED HEALTH CARE EDUCATION/TRAINING PROGRAM

## 2023-11-24 PROCEDURE — 63600175 PHARM REV CODE 636 W HCPCS: Mod: NTX | Performed by: PHYSICIAN ASSISTANT

## 2023-11-24 PROCEDURE — 85730 THROMBOPLASTIN TIME PARTIAL: CPT | Mod: NTX | Performed by: STUDENT IN AN ORGANIZED HEALTH CARE EDUCATION/TRAINING PROGRAM

## 2023-11-24 PROCEDURE — 86901 BLOOD TYPING SEROLOGIC RH(D): CPT | Mod: NTX | Performed by: INTERNAL MEDICINE

## 2023-11-24 PROCEDURE — 84100 ASSAY OF PHOSPHORUS: CPT | Mod: NTX | Performed by: STUDENT IN AN ORGANIZED HEALTH CARE EDUCATION/TRAINING PROGRAM

## 2023-11-24 PROCEDURE — 99900035 HC TECH TIME PER 15 MIN (STAT): Mod: NTX

## 2023-11-24 PROCEDURE — 86850 RBC ANTIBODY SCREEN: CPT | Mod: NTX | Performed by: INTERNAL MEDICINE

## 2023-11-24 PROCEDURE — 25000003 PHARM REV CODE 250: Mod: NTX | Performed by: STUDENT IN AN ORGANIZED HEALTH CARE EDUCATION/TRAINING PROGRAM

## 2023-11-24 PROCEDURE — 63600175 PHARM REV CODE 636 W HCPCS: Mod: NTX | Performed by: NURSE PRACTITIONER

## 2023-11-24 PROCEDURE — 99291 CRITICAL CARE FIRST HOUR: CPT | Mod: NTX,,, | Performed by: PHYSICIAN ASSISTANT

## 2023-11-24 PROCEDURE — 80053 COMPREHEN METABOLIC PANEL: CPT | Mod: NTX | Performed by: STUDENT IN AN ORGANIZED HEALTH CARE EDUCATION/TRAINING PROGRAM

## 2023-11-24 PROCEDURE — 63600175 PHARM REV CODE 636 W HCPCS: Mod: NTX | Performed by: REGISTERED NURSE

## 2023-11-24 PROCEDURE — 99292 CRITICAL CARE ADDL 30 MIN: CPT | Mod: NTX,,, | Performed by: INTERNAL MEDICINE

## 2023-11-24 PROCEDURE — 25000003 PHARM REV CODE 250: Mod: NTX | Performed by: INTERNAL MEDICINE

## 2023-11-24 PROCEDURE — 25000003 PHARM REV CODE 250: Mod: NTX | Performed by: REGISTERED NURSE

## 2023-11-24 PROCEDURE — 27100094 HC IABP, SET-UP: Mod: NTX

## 2023-11-24 PROCEDURE — 85025 COMPLETE CBC W/AUTO DIFF WBC: CPT | Mod: NTX | Performed by: STUDENT IN AN ORGANIZED HEALTH CARE EDUCATION/TRAINING PROGRAM

## 2023-11-24 PROCEDURE — 82803 BLOOD GASES ANY COMBINATION: CPT | Mod: NTX

## 2023-11-24 PROCEDURE — 20000000 HC ICU ROOM: Mod: NTX

## 2023-11-24 PROCEDURE — 63600175 PHARM REV CODE 636 W HCPCS: Mod: NTX | Performed by: STUDENT IN AN ORGANIZED HEALTH CARE EDUCATION/TRAINING PROGRAM

## 2023-11-24 PROCEDURE — 25000003 PHARM REV CODE 250: Mod: NTX | Performed by: PHYSICIAN ASSISTANT

## 2023-11-24 PROCEDURE — 94761 N-INVAS EAR/PLS OXIMETRY MLT: CPT | Mod: NTX,XB

## 2023-11-24 RX ORDER — OXYCODONE HYDROCHLORIDE 5 MG/1
5 TABLET ORAL ONCE
Status: COMPLETED | OUTPATIENT
Start: 2023-11-24 | End: 2023-11-24

## 2023-11-24 RX ORDER — OXYCODONE HYDROCHLORIDE 5 MG/1
5 TABLET ORAL EVERY 6 HOURS PRN
Status: DISCONTINUED | OUTPATIENT
Start: 2023-11-25 | End: 2023-11-24

## 2023-11-24 RX ORDER — OXYCODONE HYDROCHLORIDE 5 MG/1
5 TABLET ORAL EVERY 6 HOURS PRN
Status: DISCONTINUED | OUTPATIENT
Start: 2023-11-24 | End: 2023-11-26

## 2023-11-24 RX ADMIN — INSULIN ASPART 1 UNITS: 100 INJECTION, SOLUTION INTRAVENOUS; SUBCUTANEOUS at 09:11

## 2023-11-24 RX ADMIN — INSULIN DETEMIR 18 UNITS: 100 INJECTION, SOLUTION SUBCUTANEOUS at 09:11

## 2023-11-24 RX ADMIN — ASPIRIN 81 MG: 81 TABLET, COATED ORAL at 08:11

## 2023-11-24 RX ADMIN — DOBUTAMINE IN DEXTROSE 5 MCG/KG/MIN: 400 INJECTION, SOLUTION INTRAVENOUS at 05:11

## 2023-11-24 RX ADMIN — INSULIN ASPART 2 UNITS: 100 INJECTION, SOLUTION INTRAVENOUS; SUBCUTANEOUS at 08:11

## 2023-11-24 RX ADMIN — FUROSEMIDE 40 MG/HR: 10 INJECTION, SOLUTION INTRAMUSCULAR; INTRAVENOUS at 07:11

## 2023-11-24 RX ADMIN — INSULIN ASPART 12 UNITS: 100 INJECTION, SOLUTION INTRAVENOUS; SUBCUTANEOUS at 12:11

## 2023-11-24 RX ADMIN — HEPARIN SODIUM AND DEXTROSE 16 UNITS/KG/HR: 10000; 5 INJECTION INTRAVENOUS at 01:11

## 2023-11-24 RX ADMIN — THERA TABS 1 TABLET: TAB at 08:11

## 2023-11-24 RX ADMIN — FUROSEMIDE 20 MG/HR: 10 INJECTION, SOLUTION INTRAMUSCULAR; INTRAVENOUS at 09:11

## 2023-11-24 RX ADMIN — GABAPENTIN 300 MG: 300 CAPSULE ORAL at 09:11

## 2023-11-24 RX ADMIN — INSULIN ASPART 2 UNITS: 100 INJECTION, SOLUTION INTRAVENOUS; SUBCUTANEOUS at 12:11

## 2023-11-24 RX ADMIN — INSULIN ASPART 4 UNITS: 100 INJECTION, SOLUTION INTRAVENOUS; SUBCUTANEOUS at 05:11

## 2023-11-24 RX ADMIN — HYDRALAZINE HYDROCHLORIDE 25 MG: 25 TABLET, FILM COATED ORAL at 05:11

## 2023-11-24 RX ADMIN — PANTOPRAZOLE SODIUM 40 MG: 40 TABLET, DELAYED RELEASE ORAL at 05:11

## 2023-11-24 RX ADMIN — OXYCODONE HYDROCHLORIDE 5 MG: 5 TABLET ORAL at 04:11

## 2023-11-24 RX ADMIN — ATORVASTATIN CALCIUM 40 MG: 40 TABLET, FILM COATED ORAL at 08:11

## 2023-11-24 RX ADMIN — OXYCODONE HYDROCHLORIDE 5 MG: 5 TABLET ORAL at 11:11

## 2023-11-24 RX ADMIN — ACETAMINOPHEN 650 MG: 325 TABLET ORAL at 09:11

## 2023-11-24 RX ADMIN — FUROSEMIDE 40 MG/HR: 10 INJECTION, SOLUTION INTRAMUSCULAR; INTRAVENOUS at 12:11

## 2023-11-24 RX ADMIN — AMIODARONE HYDROCHLORIDE 400 MG: 200 TABLET ORAL at 08:11

## 2023-11-24 RX ADMIN — INSULIN ASPART 12 UNITS: 100 INJECTION, SOLUTION INTRAVENOUS; SUBCUTANEOUS at 08:11

## 2023-11-24 RX ADMIN — HYDRALAZINE HYDROCHLORIDE 25 MG: 25 TABLET, FILM COATED ORAL at 09:11

## 2023-11-24 RX ADMIN — Medication 400 MG: at 08:11

## 2023-11-24 NOTE — CARE UPDATE
Hemodynamics:   Parameter   at 2000   MAP 80   CVP 7   SvO2 53   CO  5.2   CI 2.7   SVR 1126      DOBUTamine 5 mcg/kg/min (11/23/23 1842)    furosemide (LASIX) 500 mg in 50 mL infusion (conc: 10 mg/mL) 40 mg/hr (11/23/23 1801)       UOP since 7PM:    Intake/Output Summary (Last 24 hours) at 11/23/2023 2118  Last data filed at 11/23/2023 2001  Gross per 24 hour   Intake 1711.18 ml   Output 3350 ml   Net -1638.82 ml       Assessment/Plan:  - No changes overnight  - Plan was discussed with on-call attending    Jc Vivas MD  Cardiovascular Disease PGY-4  [facility]

## 2023-11-24 NOTE — SUBJECTIVE & OBJECTIVE
Interval History: Episode of N/V this AM, otherwise no overnight events or complaints this AM.     Hemodynamics:   CVP 5  Svo2 63  CO 6.0  CI 3.0  SVR 1000  PA 46/23    Having steady improvement in sCr since addition of baloon pump. Will recheck BMP this afternoon. In meantime continue lasix 40 mg/hr.     Continuous Infusions:   sodium chloride 0.9%      sodium chloride 0.9% 5 mL/hr at 11/24/23 1005    DOBUTamine 5 mcg/kg/min (11/24/23 1005)    furosemide (LASIX) 500 mg in 50 mL infusion (conc: 10 mg/mL) 40 mg/hr (11/24/23 1005)    heparin (porcine) in D5W 16 Units/kg/hr (11/24/23 1005)    sodium chloride 0.9% 250 mL/hr at 11/20/23 2002     Scheduled Meds:   amiodarone  400 mg Oral Daily    aspirin  81 mg Oral Daily    atorvastatin  40 mg Oral Daily    bisacodyL  10 mg Rectal Daily    hydrALAZINE  25 mg Oral Q8H    insulin aspart U-100  12 Units Subcutaneous TIDWM    insulin detemir U-100  18 Units Subcutaneous QHS    LIDOcaine  2 patch Transdermal Q24H    magnesium oxide  400 mg Oral Daily    multivitamin  1 tablet Oral Daily    pantoprazole  40 mg Oral Before breakfast    polyethylene glycol  17 g Oral Daily     PRN Meds:0.9%  NaCl infusion (for blood administration), sodium chloride 0.9%, acetaminophen, baclofen, dextrose 10%, dextrose 10%, gabapentin, glucagon (human recombinant), glucose, glucose, heparin (PORCINE), heparin (PORCINE), insulin aspart U-100, ondansetron, senna-docusate 8.6-50 mg, sodium chloride 0.9%, sodium chloride 0.9%    Review of patient's allergies indicates:  No Known Allergies  Objective:     Vital Signs (Most Recent):  Temp: 97.8 °F (36.6 °C) (11/24/23 0705)  Pulse: 84 (11/24/23 0905)  Resp: 18 (11/24/23 0905)  BP: 117/76 (11/24/23 0905)  SpO2: 100 % (11/24/23 0905) Vital Signs (24h Range):  Temp:  [97.4 °F (36.3 °C)-98.1 °F (36.7 °C)] 97.8 °F (36.6 °C)  Pulse:  [79-87] 84  Resp:  [12-33] 18  SpO2:  [95 %-100 %] 100 %  BP: ()/(55-91) 117/76     Patient Vitals for the past 72 hrs  (Last 3 readings):   Weight   11/24/23 0400 76.5 kg (168 lb 10.4 oz)   11/23/23 0400 73.9 kg (163 lb)   11/22/23 0400 81.6 kg (180 lb)       Body mass index is 22.87 kg/m².      Intake/Output Summary (Last 24 hours) at 11/24/2023 1109  Last data filed at 11/24/2023 1005  Gross per 24 hour   Intake 1226.77 ml   Output 3900 ml   Net -2673.23 ml         Hemodynamic Parameters:  PAP: ()/(21-74) 40/22  PAP (Mean):  [30 mmHg-92 mmHg] 30 mmHg    Telemetry: NSR        Physical Exam  HENT:      Head: Normocephalic and atraumatic.   Eyes:      Pupils: Pupils are equal, round, and reactive to light.   Neck:      Vascular: JVD present.   Cardiovascular:      Rate and Rhythm: Normal rate and regular rhythm.      Pulses: Normal pulses.      Comments: IABP. No bleeding noted at groin site.   Pulmonary:      Effort: Pulmonary effort is normal.      Breath sounds: Normal breath sounds.   Abdominal:      General: Bowel sounds are normal.      Palpations: Abdomen is soft.   Musculoskeletal:      Cervical back: Normal range of motion and neck supple.      Right lower leg: No edema.      Left lower leg: No edema.   Skin:     General: Skin is warm and dry.   Neurological:      General: No focal deficit present.      Mental Status: He is oriented to person, place, and time.            Significant Labs:  CBC:  Recent Labs   Lab 11/22/23  0553 11/23/23  0329 11/24/23  0503   WBC 7.96 7.75 10.98   RBC 2.65* 2.78* 3.16*   HGB 7.7* 8.1* 9.0*   HCT 23.7* 24.9* 28.6*    391 392   MCV 89 90 91   MCH 29.1 29.1 28.5   MCHC 32.5 32.5 31.5*       BNP:  Recent Labs   Lab 11/18/23  1236   *       CMP:  Recent Labs   Lab 11/22/23  0342 11/23/23  0329 11/23/23  1645 11/24/23  0503   * 95 124* 144*   CALCIUM 9.3 9.5 9.4 9.8   ALBUMIN 3.2* 3.2*  --  3.2*   PROT 7.1 7.3  --  7.6   * 138 133* 136   K 4.0 4.1 3.8 4.6   CO2 29 26 26 24   CL 92* 97 95 96   BUN 87* 84* 76* 66*   CREATININE 2.9* 2.5* 2.4* 2.3*   ALKPHOS 86 83  --   "86   ALT 31 28  --  26   AST 24 23  --  24   BILITOT 0.6 0.5  --  0.8        Coagulation:   Recent Labs   Lab 11/22/23  0342 11/23/23  0329 11/24/23  0503   APTT 42.3* 44.1* 49.2*       LDH:  No results for input(s): "LDH" in the last 72 hours.  Microbiology:  Microbiology Results (last 7 days)       ** No results found for the last 168 hours. **            BMP:   Recent Labs   Lab 11/24/23  0503   *      K 4.6   CL 96   CO2 24   BUN 66*   CREATININE 2.3*   CALCIUM 9.8   MG 2.6       I have reviewed all pertinent labs within the past 24 hours.    Estimated Creatinine Clearance: 36.5 mL/min (A) (based on SCr of 2.3 mg/dL (H)).    Diagnostic Results:  I have reviewed all pertinent imaging results/findings within the past 24 hours.  "

## 2023-11-24 NOTE — CARE UPDATE
Care Update:     No acute events overnight. Patient on the CICU in room [id number] A. Blood glucose stable. BG at goal on current insulin regimen (SSI, prandial, and basal insulin ). Steroid use- None. 3 Days Post-Op  Renal function- Creatinine 2.3  Vasopressors-  None       Diet diabetic 2800 Calorie; Fluid - 2000mL; Standard Tray     POCT Glucose   Date Value Ref Range Status   11/24/2023 172 (H) 70 - 110 mg/dL Final   11/23/2023 130 (H) 70 - 110 mg/dL Final   11/23/2023 127 (H) 70 - 110 mg/dL Final   11/23/2023 191 (H) 70 - 110 mg/dL Final   11/23/2023 201 (H) 70 - 110 mg/dL Final   11/22/2023 136 (H) 70 - 110 mg/dL Final   11/22/2023 136 (H) 70 - 110 mg/dL Final   11/22/2023 135 (H) 70 - 110 mg/dL Final   11/22/2023 170 (H) 70 - 110 mg/dL Final   11/22/2023 173 (H) 70 - 110 mg/dL Final   11/21/2023 218 (H) 70 - 110 mg/dL Final   11/21/2023 205 (H) 70 - 110 mg/dL Final     Lab Results   Component Value Date    HGBA1C 7.6 (H) 10/12/2023       Endocrine  Type 2 diabetes mellitus without complication, without long-term current use of insulin  BG goal 140-180     WBD 0.6 units/kg/day (60/40 split)  - Levemir (Insulin Detemir) 18 units nightly   - Novolog (Insulin Aspart) 12 units TIDWM and prn for BG excursions [facility] SSI (150/25)  - BG checks AC/HS  - Hypoglycemia protocol in place      ** Please notify Endocrine for any change and/or advance in diet**  ** Please call Endocrine for any BG related issues **    Discharge Planning:   TBD. Please notify endocrinology prior to discharge.

## 2023-11-24 NOTE — PROGRESS NOTES
[facility] - Cardiac Intensive Care  Heart Transplant  Progress Note    Patient Name: Radha Abbott  MRN: [id number]  Admission Date: 11/9/2023  Hospital Length of Stay: 15 days  Attending Physician: Sandhya Price MD  Primary Care Provider: Vasu Kong MD  Principal Problem:Acute on chronic combined systolic and diastolic CHF, NYHA class 4    Subjective:   Interval History: Episode of N/V this AM, otherwise no overnight events or complaints this AM.     Hemodynamics:   CVP 5  Svo2 63  CO 6.0  CI 3.0  SVR 1000  PA 46/23    Having steady improvement in sCr since addition of baloon pump. Will recheck BMP this afternoon. In meantime continue lasix 40 mg/hr.     Continuous Infusions:   sodium chloride 0.9%      sodium chloride 0.9% 5 mL/hr at 11/24/23 1005    DOBUTamine 5 mcg/kg/min (11/24/23 1005)    furosemide (LASIX) 500 mg in 50 mL infusion (conc: 10 mg/mL) 40 mg/hr (11/24/23 1005)    heparin (porcine) in D5W 16 Units/kg/hr (11/24/23 1005)    sodium chloride 0.9% 250 mL/hr at 11/20/23 2002     Scheduled Meds:   amiodarone  400 mg Oral Daily    aspirin  81 mg Oral Daily    atorvastatin  40 mg Oral Daily    bisacodyL  10 mg Rectal Daily    hydrALAZINE  25 mg Oral Q8H    insulin aspart U-100  12 Units Subcutaneous TIDWM    insulin detemir U-100  18 Units Subcutaneous QHS    LIDOcaine  2 patch Transdermal Q24H    magnesium oxide  400 mg Oral Daily    multivitamin  1 tablet Oral Daily    pantoprazole  40 mg Oral Before breakfast    polyethylene glycol  17 g Oral Daily     PRN Meds:0.9%  NaCl infusion (for blood administration), sodium chloride 0.9%, acetaminophen, baclofen, dextrose 10%, dextrose 10%, gabapentin, glucagon (human recombinant), glucose, glucose, heparin (PORCINE), heparin (PORCINE), insulin aspart U-100, ondansetron, senna-docusate 8.6-50 mg, sodium chloride 0.9%, sodium chloride 0.9%    Review of patient's allergies indicates:  No Known Allergies  Objective:     Vital Signs (Most Recent):  Temp:  97.8 °F (36.6 °C) (11/24/23 0705)  Pulse: 84 (11/24/23 0905)  Resp: 18 (11/24/23 0905)  BP: 117/76 (11/24/23 0905)  SpO2: 100 % (11/24/23 0905) Vital Signs (24h Range):  Temp:  [97.4 °F (36.3 °C)-98.1 °F (36.7 °C)] 97.8 °F (36.6 °C)  Pulse:  [79-87] 84  Resp:  [12-33] 18  SpO2:  [95 %-100 %] 100 %  BP: ()/(55-91) 117/76     Patient Vitals for the past 72 hrs (Last 3 readings):   Weight   11/24/23 0400 76.5 kg (168 lb 10.4 oz)   11/23/23 0400 73.9 kg (163 lb)   11/22/23 0400 81.6 kg (180 lb)       Body mass index is 22.87 kg/m².      Intake/Output Summary (Last 24 hours) at 11/24/2023 1109  Last data filed at 11/24/2023 1005  Gross per 24 hour   Intake 1226.77 ml   Output 3900 ml   Net -2673.23 ml         Hemodynamic Parameters:  PAP: ()/(21-74) 40/22  PAP (Mean):  [30 mmHg-92 mmHg] 30 mmHg    Telemetry: NSR        Physical Exam  HENT:      Head: Normocephalic and atraumatic.   Eyes:      Pupils: Pupils are equal, round, and reactive to light.   Neck:      Vascular: JVD present.   Cardiovascular:      Rate and Rhythm: Normal rate and regular rhythm.      Pulses: Normal pulses.      Comments: IABP. No bleeding noted at groin site.   Pulmonary:      Effort: Pulmonary effort is normal.      Breath sounds: Normal breath sounds.   Abdominal:      General: Bowel sounds are normal.      Palpations: Abdomen is soft.   Musculoskeletal:      Cervical back: Normal range of motion and neck supple.      Right lower leg: No edema.      Left lower leg: No edema.   Skin:     General: Skin is warm and dry.   Neurological:      General: No focal deficit present.      Mental Status: He is oriented to person, place, and time.            Significant Labs:  CBC:  Recent Labs   Lab 11/22/23  0553 11/23/23  0329 11/24/23  0503   WBC 7.96 7.75 10.98   RBC 2.65* 2.78* 3.16*   HGB 7.7* 8.1* 9.0*   HCT 23.7* 24.9* 28.6*    391 392   MCV 89 90 91   MCH 29.1 29.1 28.5   MCHC 32.5 32.5 31.5*       BNP:  Recent Labs   Lab  "11/18/23  1236   *       CMP:  Recent Labs   Lab 11/22/23  0342 11/23/23  0329 11/23/23  1645 11/24/23  0503   * 95 124* 144*   CALCIUM 9.3 9.5 9.4 9.8   ALBUMIN 3.2* 3.2*  --  3.2*   PROT 7.1 7.3  --  7.6   * 138 133* 136   K 4.0 4.1 3.8 4.6   CO2 29 26 26 24   CL 92* 97 95 96   BUN 87* 84* 76* 66*   CREATININE 2.9* 2.5* 2.4* 2.3*   ALKPHOS 86 83  --  86   ALT 31 28  --  26   AST 24 23  --  24   BILITOT 0.6 0.5  --  0.8        Coagulation:   Recent Labs   Lab 11/22/23  0342 11/23/23  0329 11/24/23  0503   APTT 42.3* 44.1* 49.2*       LDH:  No results for input(s): "LDH" in the last 72 hours.  Microbiology:  Microbiology Results (last 7 days)       ** No results found for the last 168 hours. **            BMP:   Recent Labs   Lab 11/24/23  0503   *      K 4.6   CL 96   CO2 24   BUN 66*   CREATININE 2.3*   CALCIUM 9.8   MG 2.6       I have reviewed all pertinent labs within the past 24 hours.    Estimated Creatinine Clearance: 36.5 mL/min (A) (based on SCr of 2.3 mg/dL (H)).    Diagnostic Results:  I have reviewed all pertinent imaging results/findings within the past 24 hours.  Assessment and Plan:     63 year old male with hx of CAD s/p 3v CABG (unclear anatomy, 2009), ICM with a recent EF of 15-20% s/p ICD (medtronik 2009), DM2 (a1c 7.7), HTN, HLD, Vfib on amio who presents to the ED with CC of SOB     Pt was recently admitted to [facility] as a transfer.  He was started on a Lasix gtt and did well.  Started on gDMT and discharged home on  5 with plans to follow up in HTS clinic for ongoing transplant evaluation at another facility.  He says that about a few days ago he started to notice LE swelling.  This turned into Neslon and orthopnea.  He says he can't walk to the bathroom without getting SOB.  He also complains of weight gain.  He takes torsemide 20mg BID at home and was told to trial additional Lasix which he did without any improvement.  He was rx metolazone but has not been " filled.  He came to the ED     In the ED he was AF with stable VS on RA.  CBC showing chronic anemia.  CMP notable for acute on chronic CKD with baseline around 2.1 and Cr now 2.6.  BNP elevated.  Lactate neg.  CXR showing pulmonary edema.  I evaluated the pt at the bedside.  Bedside TTE showing CVP >15.  He was subsequently admitted to the CCU for diuresis.     He was continued on his home  and was started on a lasix gtt, which he responded well to overnight (net -1700cc. He feels much better this morning as well. Our transplant coordinators have been working on insurance approval and he is now being transferred to [facility] service for transplant evaluation.     * Acute on chronic combined systolic and diastolic CHF, NYHA class 4  Pt with known ICM with an EF of 25% on home  presenting with decompensated HF.  Not in cardiogenic shock    RHC 11/14: RA 14, PA 70/35, PCWP 32, CO 4.1, CI 2.1.   Repeat RHC 11/20 RA RA  20  PA 60/29 (39)  PCWP 29    CO  4.6 l/min  CI  2.3 l/min/m2  2D echo 11/21 showed EF 15-20%, mild RV dysfunction, mild MR, LVEDD 6.9     - Home  gtt at 5  - home GDMT: entresto 24/26mg BID (on hold 2/2 IVÁN), Hydralazine 25 q8h being continued  - home diuretics: Was taking lasix 40 bid  - s/p IABP placement 11/21. Augmenting 90s at 1:1  - Continue VAD/OHTx workup. (Leaning towards LVAD) On step 4  -Blood type A+  -PT/OT and nutrition   -Continue current support with  5, Lasix 40mg/hr, IABP 1:1  -Monitor strict I/Os  -trend CVP, sv02, and PA pressures          PAF (paroxysmal atrial fibrillation)  Known hx of pAF. In sinus rhythm  - Continue home amiodarone 400mg qd  - Stop home Eliquis and continue heparin gtt while in the hospital.       IVÁN (acute kidney injury)  IVÁN on CKD2. Baseline Cr of 1.7-2.0.   -Currently 2.5. Improving  - Trend along with CVP, continue diuresis with Lasix gtt @40mg/hr   - Hold ARNi, entresto  -Trend BMPs daily     Type 2 diabetes mellitus without complication, without  long-term current use of insulin  -A1c 7.6, not insulin dependent  - MDSSI  -diabetic diet ordered/Boost glucose control   - Endocrine following, appreciate assistance with blood glucose management    CAD (coronary artery disease)  -S/p 3vCABG in 2009  - continue home ASA, HI statin    Ventricular tachycardia  Hx VT s/p ICD placement (medtronic 2009)  - Continue home amio 400mg qd    Uninterrupted Critical Care/Counseling Time (not including procedures): 60 min.       Jimy An PA-C  Heart Transplant  [facility] - Cardiac [facility]

## 2023-11-24 NOTE — PROGRESS NOTES
11/24/2023  Monet Aguiar    Current provider:  Sandhya Price MD    Device interrogation:       No data to display                   Rounded on Ledric Abbott to ensure all mechanical assist device settings (IABP or VAD) were appropriate and all parameters were within limits.  I was able to ensure all back up equipment was present, the staff had no issues, and the Perfusion Department daily rounding was complete.      For implantable VADs: Interrogation of Ventricular assist device was performed with analysis of device parameters and review of device function. I have personally reviewed the interrogation findings and agree with findings as stated.     In emergency, the nursing units have been notified to contact the perfusion department either by:  Calling [id number] from 630am to 4pm Mon thru Fri, utilizing the On-Call Finder functionality of Epic and searching for Perfusion, or by contacting the hospital  from 4pm to 630am and on weekends and asking to speak with the perfusionist on call.    11:33 AM

## 2023-11-24 NOTE — ASSESSMENT & PLAN NOTE
Pt with known ICM with an EF of 25% on home  presenting with decompensated HF.  Not in cardiogenic shock    RHC 11/14: RA 14, PA 70/35, PCWP 32, CO 4.1, CI 2.1.   Repeat RHC 11/20 RA RA  20  PA 60/29 (39)  PCWP 29    CO  4.6 l/min  CI  2.3 l/min/m2  2D echo 11/21 showed EF 15-20%, mild RV dysfunction, mild MR, LVEDD 6.9     - Home  gtt at 5  - home GDMT: entresto 24/26mg BID (on hold 2/2 IVÁN), Hydralazine 25 q8h being continued  - home diuretics: Was taking lasix 40 bid  - s/p IABP placement 11/21. Augmenting 90s at 1:1  - Continue VAD/OHTx workup. (Leaning towards LVAD) On step 4  -Blood type A+  -PT/OT and nutrition   -Continue current support with  5, Lasix 40mg/hr, IABP 1:1  -Monitor strict I/Os  -trend CVP, sv02, and PA pressures

## 2023-11-24 NOTE — PLAN OF CARE
Cardiac ICU Care Plan    CVP 7, 6, 4,   SvO2 53   NAEON     POC reviewed with Radha Abbott and family. Questions and concerns addressed. No acute events today. Pt progressing toward goals. Will continue to monitor. See below and flowsheets for full assessment and VS info.       Neuro:  [place] Coma Scale  Best Eye Response: 4-->(E4) spontaneous  Best Motor Response: 6-->(M6) obeys commands  Best Verbal Response: 5-->(V5) oriented  Michelle Coma Scale Score: 15  Assessment Qualifiers: patient not sedated/intubated  Pupil PERRLA: yes    24 hr Temp:  [97.4 °F (36.3 °C)-98.1 °F (36.7 °C)]      CV:  Rhythm: normal sinus rhythm   DVT prophylaxis: VTE Required Core Measure: Pharmacological prophylaxis initiated/maintained    CVP (mean): 4 mmHg (11/24/23 0301)    Pulmonary Artery Catheter Assessment  11/20/23 1330 Internal Jugular Left-Current Insertion Depth (cm): 65 cm (11/23/23 1901)  PAP: 119/74 (11/23/23 2301)  SVO2 (%): 53 % (11/23/23 1901)    IABP Mode: Auto  IABP Rate: 1:1  IABP Trigger: ECG  IABP Augmented Diastolic Pressure: 92          Pulses  Right Radial Pulse: 2+ (normal)  Left Radial Pulse: 2+ (normal)  Right Dorsalis Pedis Pulse: 1+ (weak)  Left Dorsalis Pedis Pulse: 1+ (weak)  Right Posterior Tibial Pulse: 1+ (weak)  Left Posterior Tibial Pulse: 1+ (weak)    Resp:  Flow (L/min): 4  Oxygen Concentration (%): 36    GI/:  GI prophylaxis: yes  Diet/Nutrition Received: consistent carb/diabetic diet, restrict fluids (2L FR)  Last Bowel Movement: 11/23/23  Voiding Characteristics: voids spontaneously without difficulty, external catheter   Intake/Output Summary (Last 24 hours) at 11/24/2023 0556  Last data filed at 11/24/2023 0501  Gross per 24 hour   Intake 1792.7 ml   Output 4275 ml   Net -2482.3 ml        Nutritional Supplement Intake: Quantity 0, Type: Boost    Labs/Accuchecks:  Recent Labs   Lab 11/22/23  0553 11/23/23  0329 11/24/23  0503   WBC 7.96 7.75 10.98   RBC 2.65* 2.78* 3.16*   HGB 7.7* 8.1* 9.0*  "  HCT 23.7* 24.9* 28.6*    391 392      Recent Labs   Lab 11/22/23  0342 11/23/23  0329 11/24/23  0503   APTT 42.3* 44.1* 49.2*      Recent Labs     11/24/23  0503      K 4.6   CO2 24   CL 96   BUN 66*   CREATININE 2.3*   ALKPHOS 86   ALT 26   AST 24   BILITOT 0.8     No results for input(s): "CPK", "CPKMB", "MB", "TROPONINI" in the last 168 hours.   Recent Labs     11/22/23  1638 11/22/23  2017 11/23/23  0731 11/23/23  2043   PH 7.431 7.435  --  7.478*   PCO2 41.4 41.2  --  38.2   PO2 28* 28* 29* 26*   HCO3 27.5 27.7  --  28.3*   POCSATURATED 53 54 59 53   BE 3* 3*  --  5*       Electrolytes: No replacement orders  Accuchecks: ACHS    Gtts/LDAs:   sodium chloride 0.9%      sodium chloride 0.9% 5 mL/hr at 11/24/23 0501    DOBUTamine 5 mcg/kg/min (11/24/23 0501)    furosemide (LASIX) 500 mg in 50 mL infusion (conc: 10 mg/mL) 40 mg/hr (11/24/23 0501)    heparin (porcine) in D5W 16 Units/kg/hr (11/24/23 0501)    sodium chloride 0.9% 250 mL/hr at 11/20/23 2002       Lines/Drains/Airways       Central Venous Catheter Line  Duration              Introducer Single Lumen 11/20/23 1330 Internal Jugular Left 3 days    Pulmonary Artery Catheter Assessment  11/20/23 1330 Internal Jugular Left 3 days              Drain  Duration                  Urine Pouch  11/20/23 2202 other (see comments) 3 days              Line  Duration                  IABP 11/21/23 1048 7.5 Fr. 40 mL 2 days              Peripheral Intravenous Line  Duration                  Midline Catheter Insertion/Assessment  - Single Lumen 11/14/23 1113 Left brachial vein 20g x 10cm 9 days                    Skin/Wounds  Bathing/Skin Care: (S) back care;bath, complete;dressed/undressed;electrode patches/site rotation;linen changed (11/23/23 1701)  Wounds: No  Wound care consulted: No     Problem: Adult Inpatient Plan of Care  Goal: Plan of Care Review  Outcome: Ongoing, Progressing     "

## 2023-11-25 LAB
ALBUMIN SERPL BCP-MCNC: 3.1 G/DL (ref 3.5–5.2)
ALLENS TEST: ABNORMAL
ALLENS TEST: ABNORMAL
ALP SERPL-CCNC: 87 U/L (ref 55–135)
ALT SERPL W/O P-5'-P-CCNC: 18 U/L (ref 10–44)
ANION GAP SERPL CALC-SCNC: 16 MMOL/L (ref 8–16)
ANION GAP SERPL CALC-SCNC: 18 MMOL/L (ref 8–16)
APTT PPP: 48.4 SEC (ref 21–32)
AST SERPL-CCNC: 22 U/L (ref 10–40)
BASOPHILS # BLD AUTO: 0.04 K/UL (ref 0–0.2)
BASOPHILS NFR BLD: 0.4 % (ref 0–1.9)
BILIRUB SERPL-MCNC: 0.7 MG/DL (ref 0.1–1)
BUN SERPL-MCNC: 79 MG/DL (ref 8–23)
BUN SERPL-MCNC: 80 MG/DL (ref 8–23)
CALCIUM SERPL-MCNC: 9.8 MG/DL (ref 8.7–10.5)
CALCIUM SERPL-MCNC: 9.9 MG/DL (ref 8.7–10.5)
CHLORIDE SERPL-SCNC: 95 MMOL/L (ref 95–110)
CHLORIDE SERPL-SCNC: 96 MMOL/L (ref 95–110)
CO2 SERPL-SCNC: 23 MMOL/L (ref 23–29)
CO2 SERPL-SCNC: 25 MMOL/L (ref 23–29)
CREAT SERPL-MCNC: 2.4 MG/DL (ref 0.5–1.4)
CREAT SERPL-MCNC: 2.4 MG/DL (ref 0.5–1.4)
DELSYS: ABNORMAL
DELSYS: ABNORMAL
DIFFERENTIAL METHOD BLD: ABNORMAL
EOSINOPHIL # BLD AUTO: 0 K/UL (ref 0–0.5)
EOSINOPHIL NFR BLD: 0.3 % (ref 0–8)
ERYTHROCYTE [DISTWIDTH] IN BLOOD BY AUTOMATED COUNT: 14.7 % (ref 11.5–14.5)
ERYTHROCYTE [SEDIMENTATION RATE] IN BLOOD BY WESTERGREN METHOD: 16 MM/H
EST. GFR  (NO RACE VARIABLE): 29.9 ML/MIN/1.73 M^2
EST. GFR  (NO RACE VARIABLE): 29.9 ML/MIN/1.73 M^2
FIO2: 21
GLUCOSE SERPL-MCNC: 141 MG/DL (ref 70–110)
GLUCOSE SERPL-MCNC: 143 MG/DL (ref 70–110)
HCT VFR BLD AUTO: 27.5 % (ref 40–54)
HGB BLD-MCNC: 8.9 G/DL (ref 14–18)
IMM GRANULOCYTES # BLD AUTO: 0.06 K/UL (ref 0–0.04)
IMM GRANULOCYTES NFR BLD AUTO: 0.5 % (ref 0–0.5)
LYMPHOCYTES # BLD AUTO: 0.9 K/UL (ref 1–4.8)
LYMPHOCYTES NFR BLD: 8.2 % (ref 18–48)
MAGNESIUM SERPL-MCNC: 2.7 MG/DL (ref 1.6–2.6)
MAGNESIUM SERPL-MCNC: 2.7 MG/DL (ref 1.6–2.6)
MCH RBC QN AUTO: 28.5 PG (ref 27–31)
MCHC RBC AUTO-ENTMCNC: 32.4 G/DL (ref 32–36)
MCV RBC AUTO: 88 FL (ref 82–98)
MODE: ABNORMAL
MONOCYTES # BLD AUTO: 0.7 K/UL (ref 0.3–1)
MONOCYTES NFR BLD: 6.5 % (ref 4–15)
NEUTROPHILS # BLD AUTO: 9.2 K/UL (ref 1.8–7.7)
NEUTROPHILS NFR BLD: 84.1 % (ref 38–73)
NRBC BLD-RTO: 0 /100 WBC
PHOSPHATE SERPL-MCNC: 5 MG/DL (ref 2.7–4.5)
PLATELET # BLD AUTO: 353 K/UL (ref 150–450)
PMV BLD AUTO: 10.6 FL (ref 9.2–12.9)
PO2 BLDA: 25 MMHG (ref 40–60)
PO2 BLDA: 28 MMHG (ref 40–60)
POC SATURATED O2: 49 % (ref 95–100)
POC SATURATED O2: 57 % (ref 95–100)
POCT GLUCOSE: 125 MG/DL (ref 70–110)
POCT GLUCOSE: 128 MG/DL (ref 70–110)
POCT GLUCOSE: 205 MG/DL (ref 70–110)
POCT GLUCOSE: 214 MG/DL (ref 70–110)
POCT GLUCOSE: 224 MG/DL (ref 70–110)
POTASSIUM SERPL-SCNC: 4.1 MMOL/L (ref 3.5–5.1)
POTASSIUM SERPL-SCNC: 4.3 MMOL/L (ref 3.5–5.1)
PROT SERPL-MCNC: 7.4 G/DL (ref 6–8.4)
RBC # BLD AUTO: 3.12 M/UL (ref 4.6–6.2)
SAMPLE: ABNORMAL
SAMPLE: ABNORMAL
SITE: ABNORMAL
SITE: ABNORMAL
SODIUM SERPL-SCNC: 136 MMOL/L (ref 136–145)
SODIUM SERPL-SCNC: 137 MMOL/L (ref 136–145)
SP02: 98
WBC # BLD AUTO: 10.92 K/UL (ref 3.9–12.7)

## 2023-11-25 PROCEDURE — 25000003 PHARM REV CODE 250: Mod: NTX | Performed by: INTERNAL MEDICINE

## 2023-11-25 PROCEDURE — 99232 SBSQ HOSP IP/OBS MODERATE 35: CPT | Mod: NTX,,,

## 2023-11-25 PROCEDURE — 84100 ASSAY OF PHOSPHORUS: CPT | Mod: NTX | Performed by: STUDENT IN AN ORGANIZED HEALTH CARE EDUCATION/TRAINING PROGRAM

## 2023-11-25 PROCEDURE — 63600175 PHARM REV CODE 636 W HCPCS: Mod: NTX | Performed by: STUDENT IN AN ORGANIZED HEALTH CARE EDUCATION/TRAINING PROGRAM

## 2023-11-25 PROCEDURE — 27000202 HC IABP, ADD'L DAY: Mod: NTX

## 2023-11-25 PROCEDURE — 85730 THROMBOPLASTIN TIME PARTIAL: CPT | Mod: NTX | Performed by: STUDENT IN AN ORGANIZED HEALTH CARE EDUCATION/TRAINING PROGRAM

## 2023-11-25 PROCEDURE — 99291 CRITICAL CARE FIRST HOUR: CPT | Mod: NTX,,, | Performed by: INTERNAL MEDICINE

## 2023-11-25 PROCEDURE — 83735 ASSAY OF MAGNESIUM: CPT | Mod: NTX | Performed by: STUDENT IN AN ORGANIZED HEALTH CARE EDUCATION/TRAINING PROGRAM

## 2023-11-25 PROCEDURE — 25000003 PHARM REV CODE 250: Mod: NTX | Performed by: STUDENT IN AN ORGANIZED HEALTH CARE EDUCATION/TRAINING PROGRAM

## 2023-11-25 PROCEDURE — 80048 BASIC METABOLIC PNL TOTAL CA: CPT | Mod: NTX,XB | Performed by: STUDENT IN AN ORGANIZED HEALTH CARE EDUCATION/TRAINING PROGRAM

## 2023-11-25 PROCEDURE — 20000000 HC ICU ROOM: Mod: NTX

## 2023-11-25 PROCEDURE — 94761 N-INVAS EAR/PLS OXIMETRY MLT: CPT | Mod: NTX,XB

## 2023-11-25 PROCEDURE — 85025 COMPLETE CBC W/AUTO DIFF WBC: CPT | Mod: NTX | Performed by: STUDENT IN AN ORGANIZED HEALTH CARE EDUCATION/TRAINING PROGRAM

## 2023-11-25 PROCEDURE — 25000003 PHARM REV CODE 250: Mod: NTX | Performed by: REGISTERED NURSE

## 2023-11-25 PROCEDURE — 25000003 PHARM REV CODE 250: Mod: NTX | Performed by: PHYSICIAN ASSISTANT

## 2023-11-25 PROCEDURE — 83735 ASSAY OF MAGNESIUM: CPT | Mod: 91,NTX | Performed by: STUDENT IN AN ORGANIZED HEALTH CARE EDUCATION/TRAINING PROGRAM

## 2023-11-25 PROCEDURE — 99900035 HC TECH TIME PER 15 MIN (STAT): Mod: NTX

## 2023-11-25 PROCEDURE — 80053 COMPREHEN METABOLIC PANEL: CPT | Mod: NTX | Performed by: STUDENT IN AN ORGANIZED HEALTH CARE EDUCATION/TRAINING PROGRAM

## 2023-11-25 PROCEDURE — 82803 BLOOD GASES ANY COMBINATION: CPT | Mod: NTX

## 2023-11-25 RX ADMIN — PANTOPRAZOLE SODIUM 40 MG: 40 TABLET, DELAYED RELEASE ORAL at 05:11

## 2023-11-25 RX ADMIN — ATORVASTATIN CALCIUM 40 MG: 40 TABLET, FILM COATED ORAL at 09:11

## 2023-11-25 RX ADMIN — ASPIRIN 81 MG: 81 TABLET, COATED ORAL at 09:11

## 2023-11-25 RX ADMIN — OXYCODONE HYDROCHLORIDE 5 MG: 5 TABLET ORAL at 06:11

## 2023-11-25 RX ADMIN — HEPARIN SODIUM AND DEXTROSE 16 UNITS/KG/HR: 10000; 5 INJECTION INTRAVENOUS at 11:11

## 2023-11-25 RX ADMIN — POLYETHYLENE GLYCOL 3350 17 G: 17 POWDER, FOR SOLUTION ORAL at 09:11

## 2023-11-25 RX ADMIN — INSULIN ASPART 4 UNITS: 100 INJECTION, SOLUTION INTRAVENOUS; SUBCUTANEOUS at 01:11

## 2023-11-25 RX ADMIN — INSULIN ASPART 12 UNITS: 100 INJECTION, SOLUTION INTRAVENOUS; SUBCUTANEOUS at 08:11

## 2023-11-25 RX ADMIN — HEPARIN SODIUM AND DEXTROSE 16 UNITS/KG/HR: 10000; 5 INJECTION INTRAVENOUS at 05:11

## 2023-11-25 RX ADMIN — AMIODARONE HYDROCHLORIDE 400 MG: 200 TABLET ORAL at 09:11

## 2023-11-25 RX ADMIN — INSULIN ASPART 2 UNITS: 100 INJECTION, SOLUTION INTRAVENOUS; SUBCUTANEOUS at 08:11

## 2023-11-25 RX ADMIN — HYDRALAZINE HYDROCHLORIDE 25 MG: 25 TABLET, FILM COATED ORAL at 09:11

## 2023-11-25 RX ADMIN — OXYCODONE HYDROCHLORIDE 5 MG: 5 TABLET ORAL at 10:11

## 2023-11-25 RX ADMIN — INSULIN DETEMIR 18 UNITS: 100 INJECTION, SOLUTION SUBCUTANEOUS at 08:11

## 2023-11-25 RX ADMIN — ACETAMINOPHEN 650 MG: 325 TABLET ORAL at 08:11

## 2023-11-25 RX ADMIN — THERA TABS 1 TABLET: TAB at 09:11

## 2023-11-25 RX ADMIN — HYDRALAZINE HYDROCHLORIDE 25 MG: 25 TABLET, FILM COATED ORAL at 05:11

## 2023-11-25 RX ADMIN — INSULIN ASPART 12 UNITS: 100 INJECTION, SOLUTION INTRAVENOUS; SUBCUTANEOUS at 01:11

## 2023-11-25 RX ADMIN — Medication 400 MG: at 09:11

## 2023-11-25 RX ADMIN — INSULIN ASPART 12 UNITS: 100 INJECTION, SOLUTION INTRAVENOUS; SUBCUTANEOUS at 04:11

## 2023-11-25 RX ADMIN — GABAPENTIN 300 MG: 300 CAPSULE ORAL at 08:11

## 2023-11-25 NOTE — ASSESSMENT & PLAN NOTE
BG goal 140-180    WBD 0.6 units/kg/day (60/40 split)  - Levemir (Insulin Detemir) 18 units nightly   - Novolog (Insulin Aspart) 12 units TIDWM and prn for BG excursions [facility] [place] (150/25)  - BG checks AC/HS  - Hypoglycemia protocol in place    ** Please notify Endocrine for any change and/or advance in diet**  ** Please call Endocrine for any BG related issues **    Discharge Planning:   TBD. Please notify endocrinology prior to discharge.

## 2023-11-25 NOTE — PLAN OF CARE
CICU DAILY GOALS       A: Awake    RASS: Goal - RASS Goal: 0-->alert and calm  Actual - RASS (Chávez Agitation-Sedation Scale): alert and calm   Restraint necessity:    B: Breath   SBT: Not intubated   C: Coordinate A & B, analgesics/sedatives   Pain: managed    SAT: Not intubated  D: Delirium   CAM-ICU: Overall CAM-ICU: Negative  E: Early(intubated/ Progressive (non-intubated) Mobility   MOVE Screen: Fail   Activity: Activity Management: Rolling - L1  FAS: Feeding/Nutrition   Diet order: Diet/Nutrition Received: consistent carb/diabetic diet, restrict fluids (2L FR),   Fluid restriction: Fluid Requirement: 2000 cc FR   Nutritional Supplement Intake: Quantity 2, Type: Boost  T: Thrombus   DVT prophylaxis: VTE Required Core Measure: Pharmacological prophylaxis initiated/maintained  H: HOB Elevation   Head of Bed (HOB) Positioning: HOB at 15 degrees  U: Ulcer Prophylaxis   GI: yes  G: Glucose control   managed Glycemic Management: blood glucose monitored  S: Skin   Bundle compliance: yes   Bathing/Skin Care: back care, bath, complete, dressed/undressed, incontinence care, linen changed Date: 11/24/2023  B: Bowel Function   diarrhea   I: Indwelling Catheters   [place] necessity: [REMOVED]      Urethral Catheter 11/13/23 1800 Straight-tip 16 Fr.-Reason for Continuing Urinary Catheterization: Critically ill in ICU and requiring hourly monitoring of intake/output   CVC necessity: Yes   IPAD offered: Not appropriate  D: De-escalation Antibx   No  Plan for the day   Pt remains with R fem IABP in place, 1:1 EKG trigger. Also remains on dobutamine, lasix, and heparin gtts. Lasix gtt decreased from 40 to 20 this afternoon. CVPs 5, 7, 6. Svo2 63. L IJ swan in place, measuring at 64cm. One episode of emesis this morning after morning meds, DENIZ Abdullahi made aware. Otherwise, no big issues today. Plan for LVAD in next 1-2 weeks.  Family/Goals of care/Code Status   Code Status: Full Code     No acute events throughout day, VS and  assessment per flow sheet, patient progressing towards goals as tolerated, plan of care reviewed with Radha Abbott and family, all concerns addressed, will continue to monitor.

## 2023-11-25 NOTE — SUBJECTIVE & OBJECTIVE
Interval History: NAEON. No acute complaints.     Hemodynamics:   CVP 2  Svo2 57  CO 5.64  CI 2.84    PA 57/19      Continuous Infusions:   sodium chloride 0.9%      sodium chloride 0.9% 5 mL/hr at 11/25/23 1205    DOBUTamine 5 mcg/kg/min (11/25/23 1205)    furosemide (LASIX) 500 mg in 50 mL infusion (conc: 10 mg/mL) 10 mg/hr (11/25/23 1205)    heparin (porcine) in D5W 16 Units/kg/hr (11/25/23 1205)    sodium chloride 0.9% 250 mL/hr at 11/20/23 2002     Scheduled Meds:   amiodarone  400 mg Oral Daily    aspirin  81 mg Oral Daily    atorvastatin  40 mg Oral Daily    bisacodyL  10 mg Rectal Daily    hydrALAZINE  25 mg Oral Q8H    insulin aspart U-100  12 Units Subcutaneous TIDWM    insulin detemir U-100  18 Units Subcutaneous QHS    LIDOcaine  2 patch Transdermal Q24H    magnesium oxide  400 mg Oral Daily    multivitamin  1 tablet Oral Daily    pantoprazole  40 mg Oral Before breakfast    polyethylene glycol  17 g Oral Daily     PRN Meds:0.9%  NaCl infusion (for blood administration), sodium chloride 0.9%, acetaminophen, baclofen, dextrose 10%, dextrose 10%, gabapentin, glucagon (human recombinant), glucose, glucose, heparin (PORCINE), heparin (PORCINE), insulin aspart U-100, ondansetron, oxyCODONE, senna-docusate 8.6-50 mg, sodium chloride 0.9%, sodium chloride 0.9%    Review of patient's allergies indicates:  No Known Allergies  Objective:     Vital Signs (Most Recent):  Temp: 97.9 °F (36.6 °C) (11/25/23 1105)  Pulse: 87 (11/25/23 1205)  Resp: (!) 22 (11/25/23 1205)  BP: (!) 126/58 (11/25/23 1205)  SpO2: 95 % (11/25/23 1205) Vital Signs (24h Range):  Temp:  [97.9 °F (36.6 °C)-98.7 °F (37.1 °C)] 97.9 °F (36.6 °C)  Pulse:  [80-88] 87  Resp:  [12-26] 22  SpO2:  [95 %-100 %] 95 %  BP: ()/(51-82) 126/58     Patient Vitals for the past 72 hrs (Last 3 readings):   Weight   11/25/23 0400 77 kg (169 lb 12.1 oz)   11/24/23 1420 76.2 kg (168 lb)   11/24/23 0400 76.5 kg (168 lb 10.4 oz)       Body mass index is  23.02 kg/m².      Intake/Output Summary (Last 24 hours) at 11/25/2023 1214  Last data filed at 11/25/2023 1205  Gross per 24 hour   Intake 1091.93 ml   Output 2200 ml   Net -1108.07 ml         Hemodynamic Parameters:  PAP: (44-61)/(12-25) 57/19  PAP (Mean):  [24 mmHg-40 mmHg] 34 mmHg    Telemetry: NSR        Physical Exam  HENT:      Head: Normocephalic and atraumatic.   Eyes:      Pupils: Pupils are equal, round, and reactive to light.   Neck:      Vascular: No JVD.   Cardiovascular:      Rate and Rhythm: Normal rate and regular rhythm.      Pulses: Normal pulses.      Comments: IABP. No bleeding noted at groin site.   Pulmonary:      Effort: Pulmonary effort is normal.      Breath sounds: Normal breath sounds.   Abdominal:      General: Bowel sounds are normal.      Palpations: Abdomen is soft.   Musculoskeletal:      Cervical back: Normal range of motion and neck supple.      Right lower leg: No edema.      Left lower leg: No edema.      Comments: 2+ radial pulses   Skin:     General: Skin is warm and dry.   Neurological:      General: No focal deficit present.      Mental Status: He is oriented to person, place, and time.            Significant Labs:  CBC:  Recent Labs   Lab 11/23/23  0329 11/24/23  0503 11/25/23  0257   WBC 7.75 10.98 10.92   RBC 2.78* 3.16* 3.12*   HGB 8.1* 9.0* 8.9*   HCT 24.9* 28.6* 27.5*    392 353   MCV 90 91 88   MCH 29.1 28.5 28.5   MCHC 32.5 31.5* 32.4       BNP:  Recent Labs   Lab 11/18/23  1236   *       CMP:  Recent Labs   Lab 11/23/23  0329 11/23/23  1645 11/24/23  0503 11/24/23  1300 11/25/23  0257   GLU 95   < > 144* 177* 143*   CALCIUM 9.5   < > 9.8 9.8 9.9   ALBUMIN 3.2*  --  3.2*  --  3.1*   PROT 7.3  --  7.6  --  7.4      < > 136 134* 137   K 4.1   < > 4.6 4.3 4.3   CO2 26   < > 24 25 23   CL 97   < > 96 96 96   BUN 84*   < > 66* 67* 79*   CREATININE 2.5*   < > 2.3* 2.5* 2.4*   ALKPHOS 83  --  86  --  87   ALT 28  --  26  --  18   AST 23  --  24  --  22  "  BILITOT 0.5  --  0.8  --  0.7    < > = values in this interval not displayed.        Coagulation:   Recent Labs   Lab 11/23/23  0329 11/24/23  0503 11/25/23  0257   APTT 44.1* 49.2* 48.4*       LDH:  No results for input(s): "LDH" in the last 72 hours.  Microbiology:  Microbiology Results (last 7 days)       ** No results found for the last 168 hours. **            BMP:   Recent Labs   Lab 11/25/23 0257   *      K 4.3   CL 96   CO2 23   BUN 79*   CREATININE 2.4*   CALCIUM 9.9   MG 2.7*       I have reviewed all pertinent labs within the past 24 hours.    Estimated Creatinine Clearance: 35.2 mL/min (A) (based on SCr of 2.4 mg/dL (H)).    Diagnostic Results:  I have reviewed all pertinent imaging results/findings within the past 24 hours.  "

## 2023-11-25 NOTE — PROGRESS NOTES
[facility] - Cardiac Intensive Care  Endocrinology  Progress Note    Admit Date: 2023     Reason for Consult: Management of T2DM, Hyperglycemia     Surgical Procedure and Date: n/a    Diabetes diagnosis year:     Home Diabetes Medications:  Metformin (off since October)   Lab Results   Component Value Date    HGBA1C 7.6 (H) 10/12/2023       How often checking glucose at home?  Once daily in the AM    BG readings on regimen: 150-160s  Hypoglycemia on the regimen?  No  Missed doses on regimen?  n/a    Diabetes Complications include:     Hyperglycemia    Complicating diabetes co morbidities:   CAD s/p CABG, HTN, HLD      HPI:   Patient is a 63 y.o. male with a diagnosis of CAD s/p 3v CABG (unclear anatomy, ), ICM with a recent EF of 15-20% s/p ICD (medtronik ), DM2 (a1c 7.7), HTN, HLD, Vfib on amio who presents to the ED with CC of SOB. In the ED he was AF with stable VS on RA.  CBC showing chronic anemia.  CMP notable for acute on chronic CKD with baseline around 2.1 and Cr now 2.6.  BNP elevated.  Lactate neg.  CXR showing pulmonary edema. He was subsequently admitted to the CCU for diuresis.  Endocrinology consulted for management of T2DM.          Interval HPI:   No acute events overnight. Patient in room [id number] A. Blood glucose variable. BG at and above goal on current insulin regimen (SSI, prandial, and basal insulin ). Pt states he ate fruit/drank coke zero around time of BG check this AM. Steroid use- None.   4 Days Post-Op  Renal function- Abnormal - cr 2.4    Vasopressors-  None     Diet diabetic 2800 Calorie; Fluid - 2000mL; Standard Tray     Eatin%   Nausea: No  Hypoglycemia and intervention: No  Fever: No  TPN and/or TF: No    BP (!) 101/51 (BP Location: Left arm, Patient Position: Lying)   Pulse 86   Temp 98.7 °F (37.1 °C) (Oral)   Resp 20   Ht 6' (1.829 m)   Wt 77 kg (169 lb 12.1 oz)   SpO2 97%   BMI 23.02 kg/m²     Labs Reviewed and Include    Recent Labs   Lab  "11/25/23  0257   *   CALCIUM 9.9   ALBUMIN 3.1*   PROT 7.4      K 4.3   CO2 23   CL 96   BUN 79*   CREATININE 2.4*   ALKPHOS 87   ALT 18   AST 22   BILITOT 0.7     Lab Results   Component Value Date    WBC 10.92 11/25/2023    HGB 8.9 (L) 11/25/2023    HCT 27.5 (L) 11/25/2023    MCV 88 11/25/2023     11/25/2023     No results for input(s): "TSH", "FREET4" in the last 168 hours.  Lab Results   Component Value Date    HGBA1C 7.6 (H) 10/12/2023       Nutritional status:   Body mass index is 23.02 kg/m².  Lab Results   Component Value Date    ALBUMIN 3.1 (L) 11/25/2023    ALBUMIN 3.2 (L) 11/24/2023    ALBUMIN 3.2 (L) 11/23/2023     Lab Results   Component Value Date    PREALBUMIN 27 11/15/2023       Estimated Creatinine Clearance: 35.2 mL/min (A) (based on SCr of 2.4 mg/dL (H)).    Accu-Checks  Recent Labs     11/22/23  2018 11/23/23  0911 11/23/23  1210 11/23/23  1650 11/23/23  2042 11/24/23  0809 11/24/23  1203 11/24/23  1733 11/24/23  2104 11/25/23  0803   POCTGLUCOSE 136* 201* 191* 127* 130* 172* 171* 247* 191* 224*       Current Medications and/or Treatments Impacting Glycemic Control  Immunotherapy:    Immunosuppressants       None          Steroids:   Hormones (From admission, onward)      None          Pressors:    Autonomic Drugs (From admission, onward)      None          Hyperglycemia/Diabetes Medications:   Antihyperglycemics (From admission, onward)      Start     Stop Route Frequency Ordered    11/20/23 2100  insulin detemir U-100 (Levemir) pen 18 Units         -- SubQ Nightly 11/20/23 1648    11/20/23 1747  insulin aspart U-100 pen 0-10 Units         -- SubQ Before meals & nightly PRN 11/20/23 1648    11/19/23 1130  insulin aspart U-100 pen 12 Units         -- SubQ 3 times daily with meals 11/19/23 3408            ASSESSMENT and PLAN    Cardiac/Vascular  * Acute on chronic combined systolic and diastolic CHF, NYHA class 4  Managed per primary team  Optimize BG control      Renal/  IVÁN " (acute kidney injury)  Lab Results   Component Value Date    CREATININE 2.4 (H) 11/25/2023     Avoid insulin stacking  Titrate insulin slowly       Endocrine  Type 2 diabetes mellitus without complication, without long-term current use of insulin    BG goal 140-180    WBD 0.6 units/kg/day (60/40 split)  - Levemir (Insulin Detemir) 18 units nightly   - Novolog (Insulin Aspart) 12 units TIDWM and prn for BG excursions [facility] SSI (150/25)  - BG checks AC/HS  - Hypoglycemia protocol in place    ** Please notify Endocrine for any change and/or advance in diet**  ** Please call Endocrine for any BG related issues **    Discharge Planning:   TBD. Please notify endocrinology prior to discharge.                Ebony Carlson PA-C  Endocrinology  [facility] - Cardiac Intensive Care

## 2023-11-25 NOTE — PLAN OF CARE
Cardiac ICU Care Plan    KAREN   CVP 8 5 2  SvO2 57      POC reviewed with Radha Abbott and family. Questions and concerns addressed. No acute events today. Pt progressing toward goals. Will continue to monitor. See below and flowsheets for full assessment and VS info.       Neuro:  [place] Coma Scale  Best Eye Response: 4-->(E4) spontaneous  Best Motor Response: 6-->(M6) obeys commands  Best Verbal Response: 5-->(V5) oriented  Michelle Coma Scale Score: 15  Assessment Qualifiers: patient not sedated/intubated  Pupil PERRLA: yes    24 hr Temp:  [97.4 °F (36.3 °C)-98.6 °F (37 °C)]      CV:  Rhythm: normal sinus rhythm   DVT prophylaxis: VTE Required Core Measure: Pharmacological prophylaxis initiated/maintained    CVP (mean): 2 mmHg (11/25/23 0301)    Pulmonary Artery Catheter Assessment  11/20/23 1330 Internal Jugular Left-Current Insertion Depth (cm): 63.5 cm (11/24/23 1901)  PAP: 53/22 (11/24/23 1901)  SVO2 (%): 53 % (11/23/23 1901)    IABP Mode: Auto  IABP Rate: 1:1  IABP Trigger: ECG  IABP Augmented Diastolic Pressure: 89          Pulses  Right Radial Pulse: 2+ (normal)  Left Radial Pulse: 2+ (normal)  Right Dorsalis Pedis Pulse: 1+ (weak)  Left Dorsalis Pedis Pulse: 1+ (weak)  Right Posterior Tibial Pulse: 1+ (weak)  Left Posterior Tibial Pulse: 1+ (weak)    Resp:  Flow (L/min): 4  Oxygen Concentration (%): 36    GI/:  GI prophylaxis: yes  Diet/Nutrition Received: consistent carb/diabetic diet, restrict fluids (2 L FR)  Last Bowel Movement: 11/24/23  Voiding Characteristics: voids spontaneously without difficulty, external catheter   Intake/Output Summary (Last 24 hours) at 11/25/2023 0449  Last data filed at 11/25/2023 0401  Gross per 24 hour   Intake 1109.87 ml   Output 2325 ml   Net -1215.13 ml        Nutritional Supplement Intake: Quantity 0, Type: Boost    Labs/Accuchecks:  Recent Labs   Lab 11/23/23  0329 11/24/23  0503 11/25/23  0257   WBC 7.75 10.98 10.92   RBC 2.78* 3.16* 3.12*   HGB 8.1* 9.0* 8.9*  "  HCT 24.9* 28.6* 27.5*    392 353      Recent Labs   Lab 11/23/23  0329 11/24/23  0503 11/25/23  0257   APTT 44.1* 49.2* 48.4*      Recent Labs     11/25/23  0257      K 4.3   CO2 23   CL 96   BUN 79*   CREATININE 2.4*   ALKPHOS 87   ALT 18   AST 22   BILITOT 0.7     No results for input(s): "CPK", "CPKMB", "MB", "TROPONINI" in the last 168 hours.   Recent Labs     11/22/23  2017 11/23/23  0731 11/23/23  2043 11/24/23  0726 11/24/23  1947   PH 7.435  --  7.478*  --  7.466*   PCO2 41.2  --  38.2  --  38.4   PO2 28*   < > 26* 29* 28*   HCO3 27.7  --  28.3*  --  27.7   POCSATURATED 54   < > 53 63 57   BE 3*  --  5*  --  4*    < > = values in this interval not displayed.       Electrolytes: No replacement orders  Accuchecks: ACHS    Gtts/LDAs:   sodium chloride 0.9%      sodium chloride 0.9% 5 mL/hr at 11/25/23 0401    DOBUTamine 5 mcg/kg/min (11/25/23 0401)    furosemide (LASIX) 500 mg in 50 mL infusion (conc: 10 mg/mL) 20 mg/hr (11/25/23 0401)    heparin (porcine) in D5W 16 Units/kg/hr (11/25/23 0401)    sodium chloride 0.9% 250 mL/hr at 11/20/23 2002       Lines/Drains/Airways       Central Venous Catheter Line  Duration              Introducer Single Lumen 11/20/23 1330 Internal Jugular Left 4 days    Pulmonary Artery Catheter Assessment  11/20/23 1330 Internal Jugular Left 4 days              Drain  Duration                  Urine Pouch  11/20/23 2202 other (see comments) 4 days              Line  Duration                  IABP 11/21/23 1048 7.5 Fr. 40 mL 3 days              Peripheral Intravenous Line  Duration                  Midline Catheter Insertion/Assessment  - Single Lumen 11/14/23 1113 Left brachial vein 20g x 10cm 10 days                    Skin/Wounds  Bathing/Skin Care: patient refused (11/25/23 0301)  Wounds: No  Wound care consulted: No     Problem: Adult Inpatient Plan of Care  Goal: Plan of Care Review  Outcome: Ongoing, Progressing     "

## 2023-11-25 NOTE — PLAN OF CARE
Brief Overnight Plan of Care      Infusions:   sodium chloride 0.9%      sodium chloride 0.9% 5 mL/hr at 11/24/23 2301    DOBUTamine 5 mcg/kg/min (11/24/23 2301)    furosemide (LASIX) 500 mg in 50 mL infusion (conc: 10 mg/mL) 20 mg/hr (11/24/23 2301)    heparin (porcine) in D5W 16 Units/kg/hr (11/24/23 2301)    sodium chloride 0.9% 250 mL/hr at 11/20/23 2002       7PM Hemodynamics:  Parameters    CVP 8   SVO2 57   CO 5.1   CI 2.6   SVR 1120     [place]:  PA 53/22  PCWP 26    Since 7AM:  900cc in  1600cc out  Net -700cc      Plan:  - No changes in current plan of care  - Attempting to run about net even      Discussed with staff    Connor Gillies, DO  PGY-IV, Cardiology

## 2023-11-25 NOTE — ASSESSMENT & PLAN NOTE
Pt with known ICM with an EF of 25% on home  presenting with decompensated HF.  Not in cardiogenic shock    RHC 11/14: RA 14, PA 70/35, PCWP 32, CO 4.1, CI 2.1.   Repeat RHC 11/20 RA RA  20  PA 60/29 (39)  PCWP 29    CO  4.6 l/min  CI  2.3 l/min/m2  2D echo 11/21 showed EF 15-20%, mild RV dysfunction, mild MR, LVEDD 6.9     - Home  gtt at 5  - home GDMT: entresto 24/26mg BID (on hold 2/2 IVÁN), Hydralazine 25 q8h being continued  - home diuretics: Was taking lasix 40 bid  - s/p IABP placement 11/21. Augmenting 90s at 1:1  - Continue VAD/OHTx workup. (Leaning towards LVAD) On step 4  -Blood type A+  -PT/OT and nutrition   -Continue current support with  5,  IABP 1:1, decrease lasix to 10gtt.   -Monitor strict I/Os  -trend CVP, sv02, and PA pressures

## 2023-11-25 NOTE — ASSESSMENT & PLAN NOTE
Lab Results   Component Value Date    CREATININE 2.4 (H) 11/25/2023     Avoid insulin stacking  Titrate insulin slowly

## 2023-11-25 NOTE — PROGRESS NOTES
[facility] - Cardiac Intensive Care  Heart Transplant  Progress Note    Patient Name: Radha Abbott  MRN: [id number]  Admission Date: 11/9/2023  Hospital Length of Stay: 16 days  Attending Physician: Sandhya Price MD  Primary Care Provider: Vasu Kong MD  Principal Problem:Acute on chronic combined systolic and diastolic CHF, NYHA class 4    Subjective:   Interval History: NAEON. No acute complaints.     Hemodynamics:   CVP 2  Svo2 57  CO 5.64  CI 2.84    PA 57/19      Continuous Infusions:   sodium chloride 0.9%      sodium chloride 0.9% 5 mL/hr at 11/25/23 1205    DOBUTamine 5 mcg/kg/min (11/25/23 1205)    furosemide (LASIX) 500 mg in 50 mL infusion (conc: 10 mg/mL) 10 mg/hr (11/25/23 1205)    heparin (porcine) in D5W 16 Units/kg/hr (11/25/23 1205)    sodium chloride 0.9% 250 mL/hr at 11/20/23 2002     Scheduled Meds:   amiodarone  400 mg Oral Daily    aspirin  81 mg Oral Daily    atorvastatin  40 mg Oral Daily    bisacodyL  10 mg Rectal Daily    hydrALAZINE  25 mg Oral Q8H    insulin aspart U-100  12 Units Subcutaneous TIDWM    insulin detemir U-100  18 Units Subcutaneous QHS    LIDOcaine  2 patch Transdermal Q24H    magnesium oxide  400 mg Oral Daily    multivitamin  1 tablet Oral Daily    pantoprazole  40 mg Oral Before breakfast    polyethylene glycol  17 g Oral Daily     PRN Meds:0.9%  NaCl infusion (for blood administration), sodium chloride 0.9%, acetaminophen, baclofen, dextrose 10%, dextrose 10%, gabapentin, glucagon (human recombinant), glucose, glucose, heparin (PORCINE), heparin (PORCINE), insulin aspart U-100, ondansetron, oxyCODONE, senna-docusate 8.6-50 mg, sodium chloride 0.9%, sodium chloride 0.9%    Review of patient's allergies indicates:  No Known Allergies  Objective:     Vital Signs (Most Recent):  Temp: 97.9 °F (36.6 °C) (11/25/23 1105)  Pulse: 87 (11/25/23 1205)  Resp: (!) 22 (11/25/23 1205)  BP: (!) 126/58 (11/25/23 1205)  SpO2: 95 % (11/25/23 1205) Vital Signs (24h  Range):  Temp:  [97.9 °F (36.6 °C)-98.7 °F (37.1 °C)] 97.9 °F (36.6 °C)  Pulse:  [80-88] 87  Resp:  [12-26] 22  SpO2:  [95 %-100 %] 95 %  BP: ()/(51-82) 126/58     Patient Vitals for the past 72 hrs (Last 3 readings):   Weight   11/25/23 0400 77 kg (169 lb 12.1 oz)   11/24/23 1420 76.2 kg (168 lb)   11/24/23 0400 76.5 kg (168 lb 10.4 oz)       Body mass index is 23.02 kg/m².      Intake/Output Summary (Last 24 hours) at 11/25/2023 1214  Last data filed at 11/25/2023 1205  Gross per 24 hour   Intake 1091.93 ml   Output 2200 ml   Net -1108.07 ml         Hemodynamic Parameters:  PAP: (44-61)/(12-25) 57/19  PAP (Mean):  [24 mmHg-40 mmHg] 34 mmHg    Telemetry: NSR        Physical Exam  HENT:      Head: Normocephalic and atraumatic.   Eyes:      Pupils: Pupils are equal, round, and reactive to light.   Neck:      Vascular: No JVD.   Cardiovascular:      Rate and Rhythm: Normal rate and regular rhythm.      Pulses: Normal pulses.      Comments: IABP. No bleeding noted at groin site.   Pulmonary:      Effort: Pulmonary effort is normal.      Breath sounds: Normal breath sounds.   Abdominal:      General: Bowel sounds are normal.      Palpations: Abdomen is soft.   Musculoskeletal:      Cervical back: Normal range of motion and neck supple.      Right lower leg: No edema.      Left lower leg: No edema.      Comments: 2+ radial pulses   Skin:     General: Skin is warm and dry.   Neurological:      General: No focal deficit present.      Mental Status: He is oriented to person, place, and time.            Significant Labs:  CBC:  Recent Labs   Lab 11/23/23  0329 11/24/23  0503 11/25/23  0257   WBC 7.75 10.98 10.92   RBC 2.78* 3.16* 3.12*   HGB 8.1* 9.0* 8.9*   HCT 24.9* 28.6* 27.5*    392 353   MCV 90 91 88   MCH 29.1 28.5 28.5   MCHC 32.5 31.5* 32.4       BNP:  Recent Labs   Lab 11/18/23  1236   *       CMP:  Recent Labs   Lab 11/23/23  0329 11/23/23  1645 11/24/23  0503 11/24/23  1300 11/25/23  0257   GLU  "95   < > 144* 177* 143*   CALCIUM 9.5   < > 9.8 9.8 9.9   ALBUMIN 3.2*  --  3.2*  --  3.1*   PROT 7.3  --  7.6  --  7.4      < > 136 134* 137   K 4.1   < > 4.6 4.3 4.3   CO2 26   < > 24 25 23   CL 97   < > 96 96 96   BUN 84*   < > 66* 67* 79*   CREATININE 2.5*   < > 2.3* 2.5* 2.4*   ALKPHOS 83  --  86  --  87   ALT 28  --  26  --  18   AST 23  --  24  --  22   BILITOT 0.5  --  0.8  --  0.7    < > = values in this interval not displayed.        Coagulation:   Recent Labs   Lab 11/23/23  0329 11/24/23  0503 11/25/23  0257   APTT 44.1* 49.2* 48.4*       LDH:  No results for input(s): "LDH" in the last 72 hours.  Microbiology:  Microbiology Results (last 7 days)       ** No results found for the last 168 hours. **            BMP:   Recent Labs   Lab 11/25/23  0257   *      K 4.3   CL 96   CO2 23   BUN 79*   CREATININE 2.4*   CALCIUM 9.9   MG 2.7*       I have reviewed all pertinent labs within the past 24 hours.    Estimated Creatinine Clearance: 35.2 mL/min (A) (based on SCr of 2.4 mg/dL (H)).    Diagnostic Results:  I have reviewed all pertinent imaging results/findings within the past 24 hours.  Assessment and Plan:     63 year old male with hx of CAD s/p 3v CABG (unclear anatomy, 2009), ICM with a recent EF of 15-20% s/p ICD (medtronik 2009), DM2 (a1c 7.7), HTN, HLD, Vfib on amio who presents to the ED with CC of SOB     Pt was recently admitted to [facility] as a transfer.  He was started on a Lasix gtt and did well.  Started on gDMT and discharged home on  5 with plans to follow up in HTS clinic for ongoing transplant evaluation at another facility.  He says that about a few days ago he started to notice LE swelling.  This turned into Nelson and orthopnea.  He says he can't walk to the bathroom without getting SOB.  He also complains of weight gain.  He takes torsemide 20mg BID at home and was told to trial additional Lasix which he did without any improvement.  He was rx metolazone but has not been " filled.  He came to the ED     In the ED he was AF with stable VS on RA.  CBC showing chronic anemia.  CMP notable for acute on chronic CKD with baseline around 2.1 and Cr now 2.6.  BNP elevated.  Lactate neg.  CXR showing pulmonary edema.  I evaluated the pt at the bedside.  Bedside TTE showing CVP >15.  He was subsequently admitted to the CCU for diuresis.     He was continued on his home  and was started on a lasix gtt, which he responded well to overnight (net -1700cc. He feels much better this morning as well. Our transplant coordinators have been working on insurance approval and he is now being transferred to [facility] service for transplant evaluation.     * Acute on chronic combined systolic and diastolic CHF, NYHA class 4  Pt with known ICM with an EF of 25% on home  presenting with decompensated HF.  Not in cardiogenic shock    RHC 11/14: RA 14, PA 70/35, PCWP 32, CO 4.1, CI 2.1.   Repeat RHC 11/20 RA RA  20  PA 60/29 (39)  PCWP 29    CO  4.6 l/min  CI  2.3 l/min/m2  2D echo 11/21 showed EF 15-20%, mild RV dysfunction, mild MR, LVEDD 6.9     - Home  gtt at 5  - home GDMT: entresto 24/26mg BID (on hold 2/2 IVÁN), Hydralazine 25 q8h being continued  - home diuretics: Was taking lasix 40 bid  - s/p IABP placement 11/21. Augmenting 90s at 1:1  - Continue VAD/OHTx workup. (Leaning towards LVAD) On step 4  -Blood type A+  -PT/OT and nutrition   -Continue current support with  5,  IABP 1:1, decrease lasix to 10gtt.   -Monitor strict I/Os  -trend CVP, sv02, and PA pressures          PAF (paroxysmal atrial fibrillation)  Known hx of pAF. In sinus rhythm on admission, but 1 run of AF RVR overnight. He spontaneously converted.  - Continue home amiodarone 400mg qd  - Stop home Eliquis and continue heparin gtt while in the hospital.       IVÁN (acute kidney injury)  IVÁN on CKD2. Baseline Cr of 1.7-2.0.   -Currently 2.4. Improving  - Trend along with CVP, continue diuresis with Lasix gtt @10gtt  - Hold Dawna,  entresto  -Trend BMPs daily     Type 2 diabetes mellitus without complication, without long-term current use of insulin  -A1c 7.6, not insulin dependent  - MDSSI  -diabetic diet ordered/Boost glucose control   - Endocrine following, appreciate assistance with blood glucose management    CAD (coronary artery disease)  -S/p 3vCABG in 2009  - continue home ASA, HI statin    Ventricular tachycardia  Hx VT s/p ICD placement (medtronic 2009)  - Continue home amio 400mg qd        Angela Shen MD  Heart Transplant  [facility] - Cardiac Intensive Care

## 2023-11-25 NOTE — SUBJECTIVE & OBJECTIVE
"Interval HPI:   No acute events overnight. Patient in room [id number] A. Blood glucose variable. BG at and above goal on current insulin regimen (SSI, prandial, and basal insulin ). Steroid use- None.   4 Days Post-Op  Renal function- Abnormal - cr 2.4    Vasopressors-  None     Diet diabetic 2800 Calorie; Fluid - 2000mL; Standard Tray     Eatin%   Nausea: No  Hypoglycemia and intervention: No  Fever: No  TPN and/or TF: No    BP (!) 101/51 (BP Location: Left arm, Patient Position: Lying)   Pulse 86   Temp 98.7 °F (37.1 °C) (Oral)   Resp 20   Ht 6' (1.829 m)   Wt 77 kg (169 lb 12.1 oz)   SpO2 97%   BMI 23.02 kg/m²     Labs Reviewed and Include    Recent Labs   Lab 23   *   CALCIUM 9.9   ALBUMIN 3.1*   PROT 7.4      K 4.3   CO2 23   CL 96   BUN 79*   CREATININE 2.4*   ALKPHOS 87   ALT 18   AST 22   BILITOT 0.7     Lab Results   Component Value Date    WBC 10.92 2023    HGB 8.9 (L) 2023    HCT 27.5 (L) 2023    MCV 88 2023     2023     No results for input(s): "TSH", "FREET4" in the last 168 hours.  Lab Results   Component Value Date    HGBA1C 7.6 (H) 10/12/2023       Nutritional status:   Body mass index is 23.02 kg/m².  Lab Results   Component Value Date    ALBUMIN 3.1 (L) 2023    ALBUMIN 3.2 (L) 2023    ALBUMIN 3.2 (L) 2023     Lab Results   Component Value Date    PREALBUMIN 27 11/15/2023       Estimated Creatinine Clearance: 35.2 mL/min (A) (based on SCr of 2.4 mg/dL (H)).    Accu-Checks  Recent Labs     23  0911 23  1210 23  1650 23  2042 23  0809 23  1203 23  1733 23  2104 23  0803   POCTGLUCOSE 136* 201* 191* 127* 130* 172* 171* 247* 191* 224*       Current Medications and/or Treatments Impacting Glycemic Control  Immunotherapy:    Immunosuppressants       None          Steroids:   Hormones (From admission, onward)      None          Pressors:  "   Autonomic Drugs (From admission, onward)      None          Hyperglycemia/Diabetes Medications:   Antihyperglycemics (From admission, onward)      Start     Stop Route Frequency Ordered    11/20/23 2100  insulin detemir U-100 (Levemir) pen 18 Units         -- SubQ Nightly 11/20/23 1648    11/20/23 1747  insulin aspart U-100 pen 0-10 Units         -- SubQ Before meals & nightly PRN 11/20/23 1648    11/19/23 1130  insulin aspart U-100 pen 12 Units         -- SubQ 3 times daily with meals 11/19/23 0818

## 2023-11-25 NOTE — NURSING
1115 DENIZ Abdullahi notified of minimal UO this shift. Per PA, plans to check creatinine this afternoon at 1 pm.      1400 Based on BMP results, orders placed by PA to decrease lasix gtt from 40 to 20. Implemented.

## 2023-11-25 NOTE — ASSESSMENT & PLAN NOTE
IVÁN on CKD2. Baseline Cr of 1.7-2.0.   -Currently 2.4. Improving  - Trend along with CVP, continue diuresis with Lasix gtt @10gtt  - Hold ARNi, entresto  -Trend BMPs daily

## 2023-11-25 NOTE — NURSING
0840 Pt with episode of emesis shortly after morning meds. DENIZ Abdullahi notified. No new orders at this time and per PA, okay d/t morning meds not being emergent. Nausea medication offered to patient but pt says he does not feel nauseous anymore. Pt asked to notify nurse if feeling nauseous again.

## 2023-11-26 LAB
ALBUMIN SERPL BCP-MCNC: 2.9 G/DL (ref 3.5–5.2)
ALLENS TEST: ABNORMAL
ALLENS TEST: ABNORMAL
ALP SERPL-CCNC: 85 U/L (ref 55–135)
ALT SERPL W/O P-5'-P-CCNC: 21 U/L (ref 10–44)
ANION GAP SERPL CALC-SCNC: 13 MMOL/L (ref 8–16)
ANION GAP SERPL CALC-SCNC: 16 MMOL/L (ref 8–16)
APTT PPP: 47.8 SEC (ref 21–32)
AST SERPL-CCNC: 22 U/L (ref 10–40)
BASOPHILS # BLD AUTO: 0.05 K/UL (ref 0–0.2)
BASOPHILS NFR BLD: 0.5 % (ref 0–1.9)
BILIRUB SERPL-MCNC: 0.6 MG/DL (ref 0.1–1)
BUN SERPL-MCNC: 75 MG/DL (ref 8–23)
BUN SERPL-MCNC: 86 MG/DL (ref 8–23)
CALCIUM SERPL-MCNC: 9.5 MG/DL (ref 8.7–10.5)
CALCIUM SERPL-MCNC: 9.7 MG/DL (ref 8.7–10.5)
CHLORIDE SERPL-SCNC: 96 MMOL/L (ref 95–110)
CHLORIDE SERPL-SCNC: 97 MMOL/L (ref 95–110)
CO2 SERPL-SCNC: 23 MMOL/L (ref 23–29)
CO2 SERPL-SCNC: 25 MMOL/L (ref 23–29)
CREAT SERPL-MCNC: 2.3 MG/DL (ref 0.5–1.4)
CREAT SERPL-MCNC: 2.4 MG/DL (ref 0.5–1.4)
DELSYS: ABNORMAL
DELSYS: ABNORMAL
DIFFERENTIAL METHOD BLD: ABNORMAL
EOSINOPHIL # BLD AUTO: 0.2 K/UL (ref 0–0.5)
EOSINOPHIL NFR BLD: 1.6 % (ref 0–8)
ERYTHROCYTE [DISTWIDTH] IN BLOOD BY AUTOMATED COUNT: 14.6 % (ref 11.5–14.5)
ERYTHROCYTE [SEDIMENTATION RATE] IN BLOOD BY WESTERGREN METHOD: 16 MM/H
EST. GFR  (NO RACE VARIABLE): 29.9 ML/MIN/1.73 M^2
EST. GFR  (NO RACE VARIABLE): 31.5 ML/MIN/1.73 M^2
FIO2: 21
GLUCOSE SERPL-MCNC: 129 MG/DL (ref 70–110)
GLUCOSE SERPL-MCNC: 147 MG/DL (ref 70–110)
HCT VFR BLD AUTO: 27.2 % (ref 40–54)
HGB BLD-MCNC: 8.8 G/DL (ref 14–18)
IMM GRANULOCYTES # BLD AUTO: 0.03 K/UL (ref 0–0.04)
IMM GRANULOCYTES NFR BLD AUTO: 0.3 % (ref 0–0.5)
LYMPHOCYTES # BLD AUTO: 1 K/UL (ref 1–4.8)
LYMPHOCYTES NFR BLD: 10.5 % (ref 18–48)
MAGNESIUM SERPL-MCNC: 2.6 MG/DL (ref 1.6–2.6)
MAGNESIUM SERPL-MCNC: 2.7 MG/DL (ref 1.6–2.6)
MCH RBC QN AUTO: 28.3 PG (ref 27–31)
MCHC RBC AUTO-ENTMCNC: 32.4 G/DL (ref 32–36)
MCV RBC AUTO: 88 FL (ref 82–98)
MODE: ABNORMAL
MONOCYTES # BLD AUTO: 0.6 K/UL (ref 0.3–1)
MONOCYTES NFR BLD: 6.8 % (ref 4–15)
NEUTROPHILS # BLD AUTO: 7.5 K/UL (ref 1.8–7.7)
NEUTROPHILS NFR BLD: 80.3 % (ref 38–73)
NRBC BLD-RTO: 0 /100 WBC
PHOSPHATE SERPL-MCNC: 4.8 MG/DL (ref 2.7–4.5)
PLATELET # BLD AUTO: 327 K/UL (ref 150–450)
PMV BLD AUTO: 9.7 FL (ref 9.2–12.9)
PO2 BLDA: 28 MMHG (ref 40–60)
PO2 BLDA: 29 MMHG (ref 40–60)
POC SATURATED O2: 53 % (ref 95–100)
POC SATURATED O2: 59 % (ref 95–100)
POCT GLUCOSE: 133 MG/DL (ref 70–110)
POCT GLUCOSE: 141 MG/DL (ref 70–110)
POCT GLUCOSE: 154 MG/DL (ref 70–110)
POCT GLUCOSE: 170 MG/DL (ref 70–110)
POCT GLUCOSE: 207 MG/DL (ref 70–110)
POTASSIUM SERPL-SCNC: 4.2 MMOL/L (ref 3.5–5.1)
POTASSIUM SERPL-SCNC: 4.3 MMOL/L (ref 3.5–5.1)
PROT SERPL-MCNC: 7.2 G/DL (ref 6–8.4)
RBC # BLD AUTO: 3.11 M/UL (ref 4.6–6.2)
SAMPLE: ABNORMAL
SAMPLE: ABNORMAL
SITE: ABNORMAL
SITE: ABNORMAL
SODIUM SERPL-SCNC: 135 MMOL/L (ref 136–145)
SODIUM SERPL-SCNC: 135 MMOL/L (ref 136–145)
SP02: 94
WBC # BLD AUTO: 9.28 K/UL (ref 3.9–12.7)

## 2023-11-26 PROCEDURE — 27000202 HC IABP, ADD'L DAY: Mod: NTX

## 2023-11-26 PROCEDURE — 99900035 HC TECH TIME PER 15 MIN (STAT): Mod: NTX

## 2023-11-26 PROCEDURE — 80053 COMPREHEN METABOLIC PANEL: CPT | Mod: NTX | Performed by: STUDENT IN AN ORGANIZED HEALTH CARE EDUCATION/TRAINING PROGRAM

## 2023-11-26 PROCEDURE — 99291 CRITICAL CARE FIRST HOUR: CPT | Mod: NTX,,, | Performed by: INTERNAL MEDICINE

## 2023-11-26 PROCEDURE — 25000003 PHARM REV CODE 250: Mod: NTX | Performed by: STUDENT IN AN ORGANIZED HEALTH CARE EDUCATION/TRAINING PROGRAM

## 2023-11-26 PROCEDURE — 63600175 PHARM REV CODE 636 W HCPCS: Mod: NTX | Performed by: STUDENT IN AN ORGANIZED HEALTH CARE EDUCATION/TRAINING PROGRAM

## 2023-11-26 PROCEDURE — 80048 BASIC METABOLIC PNL TOTAL CA: CPT | Mod: NTX,XB | Performed by: STUDENT IN AN ORGANIZED HEALTH CARE EDUCATION/TRAINING PROGRAM

## 2023-11-26 PROCEDURE — 85025 COMPLETE CBC W/AUTO DIFF WBC: CPT | Mod: NTX | Performed by: STUDENT IN AN ORGANIZED HEALTH CARE EDUCATION/TRAINING PROGRAM

## 2023-11-26 PROCEDURE — 94761 N-INVAS EAR/PLS OXIMETRY MLT: CPT | Mod: NTX,XB

## 2023-11-26 PROCEDURE — 82803 BLOOD GASES ANY COMBINATION: CPT | Mod: NTX

## 2023-11-26 PROCEDURE — 25000003 PHARM REV CODE 250: Mod: NTX | Performed by: INTERNAL MEDICINE

## 2023-11-26 PROCEDURE — 84100 ASSAY OF PHOSPHORUS: CPT | Mod: NTX | Performed by: STUDENT IN AN ORGANIZED HEALTH CARE EDUCATION/TRAINING PROGRAM

## 2023-11-26 PROCEDURE — 25000003 PHARM REV CODE 250: Mod: NTX | Performed by: REGISTERED NURSE

## 2023-11-26 PROCEDURE — 25000003 PHARM REV CODE 250: Mod: NTX | Performed by: PHYSICIAN ASSISTANT

## 2023-11-26 PROCEDURE — 83735 ASSAY OF MAGNESIUM: CPT | Mod: 91,NTX | Performed by: STUDENT IN AN ORGANIZED HEALTH CARE EDUCATION/TRAINING PROGRAM

## 2023-11-26 PROCEDURE — 20000000 HC ICU ROOM: Mod: NTX

## 2023-11-26 PROCEDURE — 85730 THROMBOPLASTIN TIME PARTIAL: CPT | Mod: NTX | Performed by: STUDENT IN AN ORGANIZED HEALTH CARE EDUCATION/TRAINING PROGRAM

## 2023-11-26 PROCEDURE — 83735 ASSAY OF MAGNESIUM: CPT | Mod: NTX | Performed by: STUDENT IN AN ORGANIZED HEALTH CARE EDUCATION/TRAINING PROGRAM

## 2023-11-26 PROCEDURE — 99232 SBSQ HOSP IP/OBS MODERATE 35: CPT | Mod: NTX,,,

## 2023-11-26 RX ORDER — OXYCODONE HYDROCHLORIDE 5 MG/1
5 TABLET ORAL EVERY 6 HOURS PRN
Status: DISCONTINUED | OUTPATIENT
Start: 2023-11-26 | End: 2023-12-02

## 2023-11-26 RX ORDER — SENNOSIDES 8.6 MG/1
8.6 TABLET ORAL DAILY
Status: DISCONTINUED | OUTPATIENT
Start: 2023-11-27 | End: 2023-12-04

## 2023-11-26 RX ADMIN — INSULIN ASPART 4 UNITS: 100 INJECTION, SOLUTION INTRAVENOUS; SUBCUTANEOUS at 08:11

## 2023-11-26 RX ADMIN — HYDRALAZINE HYDROCHLORIDE 25 MG: 25 TABLET, FILM COATED ORAL at 02:11

## 2023-11-26 RX ADMIN — INSULIN ASPART 12 UNITS: 100 INJECTION, SOLUTION INTRAVENOUS; SUBCUTANEOUS at 08:11

## 2023-11-26 RX ADMIN — HYDRALAZINE HYDROCHLORIDE 25 MG: 25 TABLET, FILM COATED ORAL at 09:11

## 2023-11-26 RX ADMIN — OXYCODONE HYDROCHLORIDE 5 MG: 5 TABLET ORAL at 08:11

## 2023-11-26 RX ADMIN — OXYCODONE HYDROCHLORIDE 5 MG: 5 TABLET ORAL at 11:11

## 2023-11-26 RX ADMIN — GABAPENTIN 300 MG: 300 CAPSULE ORAL at 09:11

## 2023-11-26 RX ADMIN — HEPARIN SODIUM AND DEXTROSE 16 UNITS/KG/HR: 10000; 5 INJECTION INTRAVENOUS at 05:11

## 2023-11-26 RX ADMIN — LIDOCAINE 2 PATCH: 50 PATCH CUTANEOUS at 12:11

## 2023-11-26 RX ADMIN — DOBUTAMINE IN DEXTROSE 5 MCG/KG/MIN: 400 INJECTION, SOLUTION INTRAVENOUS at 05:11

## 2023-11-26 RX ADMIN — ACETAMINOPHEN 650 MG: 325 TABLET ORAL at 09:11

## 2023-11-26 RX ADMIN — AMIODARONE HYDROCHLORIDE 400 MG: 200 TABLET ORAL at 08:11

## 2023-11-26 RX ADMIN — INSULIN ASPART 12 UNITS: 100 INJECTION, SOLUTION INTRAVENOUS; SUBCUTANEOUS at 04:11

## 2023-11-26 RX ADMIN — Medication 400 MG: at 08:11

## 2023-11-26 RX ADMIN — HYDRALAZINE HYDROCHLORIDE 25 MG: 25 TABLET, FILM COATED ORAL at 05:11

## 2023-11-26 RX ADMIN — ATORVASTATIN CALCIUM 40 MG: 40 TABLET, FILM COATED ORAL at 08:11

## 2023-11-26 RX ADMIN — THERA TABS 1 TABLET: TAB at 08:11

## 2023-11-26 RX ADMIN — INSULIN ASPART 12 UNITS: 100 INJECTION, SOLUTION INTRAVENOUS; SUBCUTANEOUS at 12:11

## 2023-11-26 RX ADMIN — ASPIRIN 81 MG: 81 TABLET, COATED ORAL at 08:11

## 2023-11-26 RX ADMIN — INSULIN ASPART 2 UNITS: 100 INJECTION, SOLUTION INTRAVENOUS; SUBCUTANEOUS at 04:11

## 2023-11-26 RX ADMIN — PANTOPRAZOLE SODIUM 40 MG: 40 TABLET, DELAYED RELEASE ORAL at 05:11

## 2023-11-26 RX ADMIN — FUROSEMIDE 10 MG/HR: 10 INJECTION, SOLUTION INTRAMUSCULAR; INTRAVENOUS at 04:11

## 2023-11-26 RX ADMIN — INSULIN ASPART 2 UNITS: 100 INJECTION, SOLUTION INTRAVENOUS; SUBCUTANEOUS at 12:11

## 2023-11-26 RX ADMIN — SODIUM CHLORIDE: 9 INJECTION, SOLUTION INTRAVENOUS at 12:11

## 2023-11-26 RX ADMIN — POLYETHYLENE GLYCOL 3350 17 G: 17 POWDER, FOR SOLUTION ORAL at 08:11

## 2023-11-26 NOTE — PROGRESS NOTES
[facility] - Cardiac Intensive Care  Endocrinology  Progress Note    Admit Date: 2023     Reason for Consult: Management of T2DM, Hyperglycemia     Surgical Procedure and Date: n/a    Diabetes diagnosis year:     Home Diabetes Medications:  Metformin (off since October)   Lab Results   Component Value Date    HGBA1C 7.6 (H) 10/12/2023       How often checking glucose at home?  Once daily in the AM    BG readings on regimen: 150-160s  Hypoglycemia on the regimen?  No  Missed doses on regimen?  n/a    Diabetes Complications include:     Hyperglycemia    Complicating diabetes co morbidities:   CAD s/p CABG, HTN, HLD      HPI:   Patient is a 63 y.o. male with a diagnosis of CAD s/p 3v CABG (unclear anatomy, ), ICM with a recent EF of 15-20% s/p ICD (medtronik ), DM2 (a1c 7.7), HTN, HLD, Vfib on amio who presents to the ED with CC of SOB. In the ED he was AF with stable VS on RA.  CBC showing chronic anemia.  CMP notable for acute on chronic CKD with baseline around 2.1 and Cr now 2.6.  BNP elevated.  Lactate neg.  CXR showing pulmonary edema. He was subsequently admitted to the CCU for diuresis.  Endocrinology consulted for management of T2DM.          Interval HPI:   No acute events overnight. Patient in room [id number] A. Blood glucose stable. BG above goal on current insulin regimen (SSI, prandial, and basal insulin ). FBG above goal consecutively- patient denies eating/drinking around time of BG check this AM. Receiving Heparin and Dobutamine suspended in D5W. Steroid use- None.   5 Days Post-Op  Renal function- Abnormal - Cr 2.4    Vasopressors-  None     Diet diabetic 2800 Calorie; Fluid - 2000mL; Standard Tray     Eatin%  Nausea: No  Hypoglycemia and intervention: No  Fever: No  TPN and/or TF: No    /60 (BP Location: Left arm, Patient Position: Lying)   Pulse 87   Temp 98.7 °F (37.1 °C) (Oral)   Resp 18   Ht 6' (1.829 m)   Wt 77 kg (169 lb 12.1 oz)   SpO2 100%   BMI  "23.02 kg/m²     Labs Reviewed and Include    Recent Labs   Lab 11/26/23  0151   *   CALCIUM 9.5   ALBUMIN 2.9*   PROT 7.2   *   K 4.2   CO2 25   CL 97   BUN 86*   CREATININE 2.4*   ALKPHOS 85   ALT 21   AST 22   BILITOT 0.6     Lab Results   Component Value Date    WBC 9.28 11/26/2023    HGB 8.8 (L) 11/26/2023    HCT 27.2 (L) 11/26/2023    MCV 88 11/26/2023     11/26/2023     No results for input(s): "TSH", "FREET4" in the last 168 hours.  Lab Results   Component Value Date    HGBA1C 7.6 (H) 10/12/2023       Nutritional status:   Body mass index is 23.02 kg/m².  Lab Results   Component Value Date    ALBUMIN 2.9 (L) 11/26/2023    ALBUMIN 3.1 (L) 11/25/2023    ALBUMIN 3.2 (L) 11/24/2023     Lab Results   Component Value Date    PREALBUMIN 27 11/15/2023       Estimated Creatinine Clearance: 35.2 mL/min (A) (based on SCr of 2.4 mg/dL (H)).    Accu-Checks  Recent Labs     11/24/23  1203 11/24/23  1733 11/24/23  2104 11/25/23  0803 11/25/23  1238 11/25/23  1344 11/25/23  1626 11/25/23  2016 11/26/23  0112 11/26/23  0810   POCTGLUCOSE 171* 247* 191* 224* 205* 214* 128* 125* 133* 207*       Current Medications and/or Treatments Impacting Glycemic Control  Immunotherapy:    Immunosuppressants       None          Steroids:   Hormones (From admission, onward)      None          Pressors:    Autonomic Drugs (From admission, onward)      None          Hyperglycemia/Diabetes Medications:   Antihyperglycemics (From admission, onward)      Start     Stop Route Frequency Ordered    11/20/23 2100  insulin detemir U-100 (Levemir) pen 18 Units         -- SubQ Nightly 11/20/23 1648    11/20/23 1747  insulin aspart U-100 pen 0-10 Units         -- SubQ Before meals & nightly PRN 11/20/23 1648    11/19/23 1130  insulin aspart U-100 pen 12 Units         -- SubQ 3 times daily with meals 11/19/23 0818            ASSESSMENT and PLAN    Cardiac/Vascular  * Acute on chronic combined systolic and diastolic CHF, NYHA class " 4  Managed per primary team  Optimize BG control    Renal/  IVÁN (acute kidney injury)  Lab Results   Component Value Date    CREATININE 2.4 (H) 11/26/2023     Avoid insulin stacking  Titrate insulin slowly       Endocrine  Type 2 diabetes mellitus without complication, without long-term current use of insulin  BG goal 140-180    WBD 0.6 units/kg/day (60/40 split)  - Increase Levemir (Insulin Detemir) to 20 units nightly  (10% increase due to FBG above goal)   - Continue Novolog (Insulin Aspart) 12 units TIDWM and prn for BG excursions [facility] SSI (150/25)  - BG checks AC/HS  - Hypoglycemia protocol in place    ** Please notify Endocrine for any change and/or advance in diet**  ** Please call Endocrine for any BG related issues **    Discharge Planning:   TBD. Please notify endocrinology prior to discharge.                Ebony Carlson PA-C  Endocrinology  [facility] - Cardiac Intensive Care

## 2023-11-26 NOTE — PROGRESS NOTES
11/26/2023  Monet Aguiar    Current provider:  Sandhya Price MD    Device interrogation:       No data to display                   Rounded on Ledric Abbott to ensure all mechanical assist device settings (IABP or VAD) were appropriate and all parameters were within limits.  I was able to ensure all back up equipment was present, the staff had no issues, and the Perfusion Department daily rounding was complete.      For implantable VADs: Interrogation of Ventricular assist device was performed with analysis of device parameters and review of device function. I have personally reviewed the interrogation findings and agree with findings as stated.     In emergency, the nursing units have been notified to contact the perfusion department either by:  Calling [phone] from 630am to 4pm Mon thru Fri, utilizing the On-Call Finder functionality of Epic and searching for Perfusion, or by contacting the hospital  from 4pm to 630am and on weekends and asking to speak with the perfusionist on call.    5:30 PM

## 2023-11-26 NOTE — SUBJECTIVE & OBJECTIVE
Interval History: NAEON. No acute complaints.     Hemodynamics:   CVP 4  Svo2 57  CO 5.57  CI 2.81    PA 55/21      Continuous Infusions:   sodium chloride 0.9%      sodium chloride 0.9% 5 mL/hr at 11/26/23 1105    DOBUTamine 5 mcg/kg/min (11/26/23 1105)    furosemide (LASIX) 500 mg in 50 mL infusion (conc: 10 mg/mL) 10 mg/hr (11/26/23 1105)    heparin (porcine) in D5W 16 Units/kg/hr (11/26/23 1105)    sodium chloride 0.9% 250 mL/hr at 11/20/23 2002     Scheduled Meds:   amiodarone  400 mg Oral Daily    aspirin  81 mg Oral Daily    atorvastatin  40 mg Oral Daily    bisacodyL  10 mg Rectal Daily    hydrALAZINE  25 mg Oral Q8H    insulin aspart U-100  12 Units Subcutaneous TIDWM    insulin detemir U-100  20 Units Subcutaneous QHS    LIDOcaine  2 patch Transdermal Q24H    magnesium oxide  400 mg Oral Daily    multivitamin  1 tablet Oral Daily    pantoprazole  40 mg Oral Before breakfast    polyethylene glycol  17 g Oral Daily     PRN Meds:0.9%  NaCl infusion (for blood administration), sodium chloride 0.9%, acetaminophen, baclofen, dextrose 10%, dextrose 10%, gabapentin, glucagon (human recombinant), glucose, glucose, heparin (PORCINE), heparin (PORCINE), insulin aspart U-100, ondansetron, oxyCODONE, senna-docusate 8.6-50 mg, sodium chloride 0.9%, sodium chloride 0.9%    Review of patient's allergies indicates:  No Known Allergies  Objective:     Vital Signs (Most Recent):  Temp: 98.7 °F (37.1 °C) (11/26/23 0705)  Pulse: 86 (11/26/23 1105)  Resp: 15 (11/26/23 1105)  BP: 121/77 (11/26/23 1105)  SpO2: 97 % (11/26/23 1105) Vital Signs (24h Range):  Temp:  [98.1 °F (36.7 °C)-99 °F (37.2 °C)] 98.7 °F (37.1 °C)  Pulse:  [83-92] 86  Resp:  [10-29] 15  SpO2:  [94 %-100 %] 97 %  BP: (102-128)/(55-84) 121/77     Patient Vitals for the past 72 hrs (Last 3 readings):   Weight   11/26/23 0530 77 kg (169 lb 12.1 oz)   11/25/23 0400 77 kg (169 lb 12.1 oz)   11/24/23 1420 76.2 kg (168 lb)       Body mass index is 23.02  "kg/m².      Intake/Output Summary (Last 24 hours) at 11/26/2023 1205  Last data filed at 11/26/2023 1105  Gross per 24 hour   Intake 1759.62 ml   Output 2140 ml   Net -380.38 ml         Hemodynamic Parameters:  PAP: (40-60)/(12-24) 51/21  PAP (Mean):  [23 mmHg-38 mmHg] 33 mmHg    Telemetry: NSR        Physical Exam  HENT:      Head: Normocephalic and atraumatic.   Eyes:      Pupils: Pupils are equal, round, and reactive to light.   Neck:      Vascular: No JVD.   Cardiovascular:      Rate and Rhythm: Normal rate and regular rhythm.      Pulses: Normal pulses.      Comments: IABP. No bleeding noted at groin site.   Pulmonary:      Effort: Pulmonary effort is normal.      Breath sounds: Normal breath sounds.   Abdominal:      General: Bowel sounds are normal.      Palpations: Abdomen is soft.   Musculoskeletal:      Cervical back: Normal range of motion and neck supple.      Right lower leg: No edema.      Left lower leg: No edema.      Comments: 2+ radial pulses   Skin:     General: Skin is warm and dry.   Neurological:      General: No focal deficit present.      Mental Status: He is oriented to person, place, and time.            Significant Labs:  CBC:  Recent Labs   Lab 11/24/23  0503 11/25/23  0257 11/26/23  0151   WBC 10.98 10.92 9.28   RBC 3.16* 3.12* 3.11*   HGB 9.0* 8.9* 8.8*   HCT 28.6* 27.5* 27.2*    353 327   MCV 91 88 88   MCH 28.5 28.5 28.3   MCHC 31.5* 32.4 32.4       BNP:  No results for input(s): "BNP" in the last 168 hours.    Invalid input(s): "BNPTRIAGELBLO"    CMP:  Recent Labs   Lab 11/24/23  0503 11/24/23  1300 11/25/23  0257 11/25/23  1534 11/26/23  0151   *   < > 143* 141* 129*   CALCIUM 9.8   < > 9.9 9.8 9.5   ALBUMIN 3.2*  --  3.1*  --  2.9*   PROT 7.6  --  7.4  --  7.2      < > 137 136 135*   K 4.6   < > 4.3 4.1 4.2   CO2 24   < > 23 25 25   CL 96   < > 96 95 97   BUN 66*   < > 79* 80* 86*   CREATININE 2.3*   < > 2.4* 2.4* 2.4*   ALKPHOS 86  --  87  --  85   ALT 26  --  " "18  --  21   AST 24  --  22  --  22   BILITOT 0.8  --  0.7  --  0.6    < > = values in this interval not displayed.        Coagulation:   Recent Labs   Lab 11/24/23  0503 11/25/23 0257 11/26/23 0151   APTT 49.2* 48.4* 47.8*       LDH:  No results for input(s): "LDH" in the last 72 hours.  Microbiology:  Microbiology Results (last 7 days)       ** No results found for the last 168 hours. **            BMP:   Recent Labs   Lab 11/26/23 0151   *   *   K 4.2   CL 97   CO2 25   BUN 86*   CREATININE 2.4*   CALCIUM 9.5   MG 2.6       I have reviewed all pertinent labs within the past 24 hours.    Estimated Creatinine Clearance: 35.2 mL/min (A) (based on SCr of 2.4 mg/dL (H)).    Diagnostic Results:  I have reviewed all pertinent imaging results/findings within the past 24 hours.  "

## 2023-11-26 NOTE — SUBJECTIVE & OBJECTIVE
"Interval HPI:   No acute events overnight. Patient in room [id number] A. Blood glucose stable. BG above goal on current insulin regimen (SSI, prandial, and basal insulin ). FBG above goal consecutively- patient denies eating/drinking around time of BG check this AM. Receiving Heparin and Dobutamine suspended in D5W. Steroid use- None.   5 Days Post-Op  Renal function- Abnormal - Cr 2.4    Vasopressors-  None     Diet diabetic 2800 Calorie; Fluid - 2000mL; Standard Tray     Eatin%  Nausea: No  Hypoglycemia and intervention: No  Fever: No  TPN and/or TF: No    /60 (BP Location: Left arm, Patient Position: Lying)   Pulse 87   Temp 98.7 °F (37.1 °C) (Oral)   Resp 18   Ht 6' (1.829 m)   Wt 77 kg (169 lb 12.1 oz)   SpO2 100%   BMI 23.02 kg/m²     Labs Reviewed and Include    Recent Labs   Lab 23  0151   *   CALCIUM 9.5   ALBUMIN 2.9*   PROT 7.2   *   K 4.2   CO2 25   CL 97   BUN 86*   CREATININE 2.4*   ALKPHOS 85   ALT 21   AST 22   BILITOT 0.6     Lab Results   Component Value Date    WBC 9.28 2023    HGB 8.8 (L) 2023    HCT 27.2 (L) 2023    MCV 88 2023     2023     No results for input(s): "TSH", "FREET4" in the last 168 hours.  Lab Results   Component Value Date    HGBA1C 7.6 (H) 10/12/2023       Nutritional status:   Body mass index is 23.02 kg/m².  Lab Results   Component Value Date    ALBUMIN 2.9 (L) 2023    ALBUMIN 3.1 (L) 2023    ALBUMIN 3.2 (L) 2023     Lab Results   Component Value Date    PREALBUMIN 27 11/15/2023       Estimated Creatinine Clearance: 35.2 mL/min (A) (based on SCr of 2.4 mg/dL (H)).    Accu-Checks  Recent Labs     23  1203 23  1733 23  2104 23  0803 23  1238 23  1344 23  1626 23  2016 23  0112 23  0810   POCTGLUCOSE 171* 247* 191* 224* 205* 214* 128* 125* 133* 207*       Current Medications and/or Treatments Impacting Glycemic " Control  Immunotherapy:    Immunosuppressants       None          Steroids:   Hormones (From admission, onward)      None          Pressors:    Autonomic Drugs (From admission, onward)      None          Hyperglycemia/Diabetes Medications:   Antihyperglycemics (From admission, onward)      Start     Stop Route Frequency Ordered    11/20/23 2100  insulin detemir U-100 (Levemir) pen 18 Units         -- SubQ Nightly 11/20/23 1648    11/20/23 1747  insulin aspart U-100 pen 0-10 Units         -- SubQ Before meals & nightly PRN 11/20/23 1648    11/19/23 1130  insulin aspart U-100 pen 12 Units         -- SubQ 3 times daily with meals 11/19/23 0818

## 2023-11-26 NOTE — ASSESSMENT & PLAN NOTE
IVÁN on CKD2. Baseline Cr of 1.7-2.0.   -Currently 2.4. Stable  - Trend along with CVP, continue diuresis with Lasix gtt @10gtt  - Hold ARNi, entresto  -Trend BMPs daily

## 2023-11-26 NOTE — ASSESSMENT & PLAN NOTE
Lab Results   Component Value Date    CREATININE 2.4 (H) 11/26/2023     Avoid insulin stacking  Titrate insulin slowly

## 2023-11-26 NOTE — CARE UPDATE
Hemodynamics Update    Event: routine monitoring    Vitals:    11/25/23 2301   BP:    Pulse:    Resp:    Temp: 99 °F (37.2 °C)       Hemodynamics:   Parameter      CVP 5   SvO2 49       CO 4.7   CI 2.4   SVR 1100         Plan:  - Repeat hemodynamics as scheduled  - Plan was discussed with attending staff     Bashir Randle MD  Cardiology  Pager: [phone]

## 2023-11-26 NOTE — PROGRESS NOTES
11/25/2023  Deni Frye    Current provider:  Sandhya Price MD    Device interrogation:       No data to display                   Rounded on [facility] to ensure all mechanical assist device settings (IABP or VAD) were appropriate and all parameters were within limits.  I was able to ensure all back up equipment was present, the staff had no issues, and the Perfusion Department daily rounding was complete.      For implantable VADs: Interrogation of Ventricular assist device was performed with analysis of device parameters and review of device function. I have personally reviewed the interrogation findings and agree with findings as stated.     In emergency, the nursing units have been notified to contact the perfusion department either by:  Calling [phone] from 630am to 4pm Mon thru Fri, utilizing the On-Call Finder functionality of Epic and searching for Perfusion, or by contacting the hospital  from 4pm to 630am and on weekends and asking to speak with the perfusionist on call.    9:38 PM

## 2023-11-26 NOTE — PROGRESS NOTES
[facility] - Cardiac Intensive Care  Heart Transplant  Progress Note    Patient Name: Radha Abbott  MRN: [id number]  Admission Date: 11/9/2023  Hospital Length of Stay: 17 days  Attending Physician: Sandhya Price MD  Primary Care Provider: Vasu Kong MD  Principal Problem:Acute on chronic combined systolic and diastolic CHF, NYHA class 4    Subjective:   Interval History: NAEON. No acute complaints.     Hemodynamics:   CVP 4  Svo2 57  CO 5.57  CI 2.81    PA 55/21      Continuous Infusions:   sodium chloride 0.9%      sodium chloride 0.9% 5 mL/hr at 11/26/23 1105    DOBUTamine 5 mcg/kg/min (11/26/23 1105)    furosemide (LASIX) 500 mg in 50 mL infusion (conc: 10 mg/mL) 10 mg/hr (11/26/23 1105)    heparin (porcine) in D5W 16 Units/kg/hr (11/26/23 1105)    sodium chloride 0.9% 250 mL/hr at 11/20/23 2002     Scheduled Meds:   amiodarone  400 mg Oral Daily    aspirin  81 mg Oral Daily    atorvastatin  40 mg Oral Daily    bisacodyL  10 mg Rectal Daily    hydrALAZINE  25 mg Oral Q8H    insulin aspart U-100  12 Units Subcutaneous TIDWM    insulin detemir U-100  20 Units Subcutaneous QHS    LIDOcaine  2 patch Transdermal Q24H    magnesium oxide  400 mg Oral Daily    multivitamin  1 tablet Oral Daily    pantoprazole  40 mg Oral Before breakfast    polyethylene glycol  17 g Oral Daily     PRN Meds:0.9%  NaCl infusion (for blood administration), sodium chloride 0.9%, acetaminophen, baclofen, dextrose 10%, dextrose 10%, gabapentin, glucagon (human recombinant), glucose, glucose, heparin (PORCINE), heparin (PORCINE), insulin aspart U-100, ondansetron, oxyCODONE, senna-docusate 8.6-50 mg, sodium chloride 0.9%, sodium chloride 0.9%    Review of patient's allergies indicates:  No Known Allergies  Objective:     Vital Signs (Most Recent):  Temp: 98.7 °F (37.1 °C) (11/26/23 0705)  Pulse: 86 (11/26/23 1105)  Resp: 15 (11/26/23 1105)  BP: 121/77 (11/26/23 1105)  SpO2: 97 % (11/26/23 1105) Vital Signs (24h Range):  Temp:   "[98.1 °F (36.7 °C)-99 °F (37.2 °C)] 98.7 °F (37.1 °C)  Pulse:  [83-92] 86  Resp:  [10-29] 15  SpO2:  [94 %-100 %] 97 %  BP: (102-128)/(55-84) 121/77     Patient Vitals for the past 72 hrs (Last 3 readings):   Weight   11/26/23 0530 77 kg (169 lb 12.1 oz)   11/25/23 0400 77 kg (169 lb 12.1 oz)   11/24/23 1420 76.2 kg (168 lb)       Body mass index is 23.02 kg/m².      Intake/Output Summary (Last 24 hours) at 11/26/2023 1205  Last data filed at 11/26/2023 1105  Gross per 24 hour   Intake 1759.62 ml   Output 2140 ml   Net -380.38 ml         Hemodynamic Parameters:  PAP: (40-60)/(12-24) 51/21  PAP (Mean):  [23 mmHg-38 mmHg] 33 mmHg    Telemetry: NSR        Physical Exam  HENT:      Head: Normocephalic and atraumatic.   Eyes:      Pupils: Pupils are equal, round, and reactive to light.   Neck:      Vascular: No JVD.   Cardiovascular:      Rate and Rhythm: Normal rate and regular rhythm.      Pulses: Normal pulses.      Comments: IABP. No bleeding noted at groin site.   Pulmonary:      Effort: Pulmonary effort is normal.      Breath sounds: Normal breath sounds.   Abdominal:      General: Bowel sounds are normal.      Palpations: Abdomen is soft.   Musculoskeletal:      Cervical back: Normal range of motion and neck supple.      Right lower leg: No edema.      Left lower leg: No edema.      Comments: 2+ radial pulses   Skin:     General: Skin is warm and dry.   Neurological:      General: No focal deficit present.      Mental Status: He is oriented to person, place, and time.            Significant Labs:  CBC:  Recent Labs   Lab 11/24/23  0503 11/25/23  0257 11/26/23  0151   WBC 10.98 10.92 9.28   RBC 3.16* 3.12* 3.11*   HGB 9.0* 8.9* 8.8*   HCT 28.6* 27.5* 27.2*    353 327   MCV 91 88 88   MCH 28.5 28.5 28.3   MCHC 31.5* 32.4 32.4       BNP:  No results for input(s): "BNP" in the last 168 hours.    Invalid input(s): "BNPTRIAGELBLO"    CMP:  Recent Labs   Lab 11/24/23  0503 11/24/23  1300 11/25/23  0257 " "11/25/23  1534 11/26/23  0151   *   < > 143* 141* 129*   CALCIUM 9.8   < > 9.9 9.8 9.5   ALBUMIN 3.2*  --  3.1*  --  2.9*   PROT 7.6  --  7.4  --  7.2      < > 137 136 135*   K 4.6   < > 4.3 4.1 4.2   CO2 24   < > 23 25 25   CL 96   < > 96 95 97   BUN 66*   < > 79* 80* 86*   CREATININE 2.3*   < > 2.4* 2.4* 2.4*   ALKPHOS 86  --  87  --  85   ALT 26  --  18  --  21   AST 24  --  22  --  22   BILITOT 0.8  --  0.7  --  0.6    < > = values in this interval not displayed.        Coagulation:   Recent Labs   Lab 11/24/23  0503 11/25/23  0257 11/26/23  0151   APTT 49.2* 48.4* 47.8*       LDH:  No results for input(s): "LDH" in the last 72 hours.  Microbiology:  Microbiology Results (last 7 days)       ** No results found for the last 168 hours. **            BMP:   Recent Labs   Lab 11/26/23  0151   *   *   K 4.2   CL 97   CO2 25   BUN 86*   CREATININE 2.4*   CALCIUM 9.5   MG 2.6       I have reviewed all pertinent labs within the past 24 hours.    Estimated Creatinine Clearance: 35.2 mL/min (A) (based on SCr of 2.4 mg/dL (H)).    Diagnostic Results:  I have reviewed all pertinent imaging results/findings within the past 24 hours.  Assessment and Plan:     63 year old male with hx of CAD s/p 3v CABG (unclear anatomy, 2009), ICM with a recent EF of 15-20% s/p ICD (medtronik 2009), DM2 (a1c 7.7), HTN, HLD, Vfib on amio who presents to the ED with CC of SOB     Pt was recently admitted to [facility] [place] a transfer.  He was started on a Lasix gtt and did well.  Started on gDMT and discharged home on  5 with plans to follow up in HTS clinic for ongoing transplant evaluation at another facility.  He says that about a few days ago he started to notice LE swelling.  This turned into Nelson and orthopnea.  He says he can't walk to the bathroom without getting SOB.  He also complains of weight gain.  He takes torsemide 20mg BID at home and was told to trial additional Lasix which he did without any improvement.  He " was rx metolazone but has not been filled.  He came to the ED     In the ED he was AF with stable VS on RA.  CBC showing chronic anemia.  CMP notable for acute on chronic CKD with baseline around 2.1 and Cr now 2.6.  BNP elevated.  Lactate neg.  CXR showing pulmonary edema.  I evaluated the pt at the bedside.  Bedside TTE showing CVP >15.  He was subsequently admitted to the CCU for diuresis.     He was continued on his home  and was started on a lasix gtt, which he responded well to overnight (net -1700cc. He feels much better this morning as well. Our transplant coordinators have been working on insurance approval and he is now being transferred to [facility] service for transplant evaluation.     * Acute on chronic combined systolic and diastolic CHF, NYHA class 4  Pt with known ICM with an EF of 25% on home  presenting with decompensated HF.  Not in cardiogenic shock    RHC 11/14: RA 14, PA 70/35, PCWP 32, CO 4.1, CI 2.1.   Repeat RHC 11/20 RA RA  20  PA 60/29 (39)  PCWP 29    CO  4.6 l/min  CI  2.3 l/min/m2  2D echo 11/21 showed EF 15-20%, mild RV dysfunction, mild MR, LVEDD 6.9     - Home  gtt at 5  - home GDMT: entresto 24/26mg BID (on hold 2/2 IVÁN), Hydralazine 25 q8h being continued  - home diuretics: Was taking lasix 40 bid  - s/p IABP placement 11/21. Augmenting 90s at 1:1  - Continue VAD/OHTx workup. (Leaning towards LVAD) On step 4  -Blood type A+  -PT/OT and nutrition   -Continue current support with  5,  IABP 1:1, continue lasix to 10gtt.   -Monitor strict I/Os  -trend CVP, sv02, and PA pressures          PAF (paroxysmal atrial fibrillation)  Known hx of pAF. In sinus rhythm on admission, but 1 run of AF RVR overnight. He spontaneously converted.  - Continue home amiodarone 400mg qd  - Stop home Eliquis and continue heparin gtt while in the hospital.       IVÁN (acute kidney injury)  IVÁN on CKD2. Baseline Cr of 1.7-2.0.   -Currently 2.4. Stable  - Trend along with CVP, continue diuresis with Lasix  gtt @10gtt  - Hold ARNi, entresto  -Trend BMPs daily     Type 2 diabetes mellitus without complication, without long-term current use of insulin  -A1c 7.6, not insulin dependent  - MDSSI  -diabetic diet ordered/Boost glucose control   - Endocrine following, appreciate assistance with blood glucose management    CAD (coronary artery disease)  -S/p 3vCABG in 2009  - continue home ASA, HI statin    Ventricular tachycardia  Hx VT s/p ICD placement (medtronic 2009)  - Continue home amio 400mg qd        Angela Shen MD  Heart Transplant  [facility] - Cardiac Intensive Care

## 2023-11-26 NOTE — PLAN OF CARE
Cardiac ICU Care Plan    POC reviewed with Radha Abbott and family. Questions and concerns addressed. No acute events today. Pt progressing toward goals. Will continue to monitor. See below and flowsheets for full assessment and VS info.     Neuro:  Michelle Coma Scale  Best Eye Response: 4-->(E4) spontaneous  Best Motor Response: 6-->(M6) obeys commands  Best Verbal Response: 5-->(V5) oriented  Michelle Coma Scale Score: 15  Assessment Qualifiers: patient not sedated/intubated  Pupil PERRLA: yes    24 hr Temp:  [97.9 °F (36.6 °C)-99 °F (37.2 °C)]      CV:  Rhythm: normal sinus rhythm   DVT prophylaxis: VTE Required Core Measure: Pharmacological prophylaxis initiated/maintained    CVP (mean): (S) 5 mmHg (11/25/23 1910)    Pulmonary Artery Catheter Assessment  11/20/23 1330 Internal Jugular Left-Current Insertion Depth (cm): 63 cm (11/25/23 1901)  PAP: 49/16 (11/25/23 1805)  SVO2 (%): 53 % (11/23/23 1901)    IABP Mode: Auto  IABP Rate: 1:1  IABP Trigger: ECG  IABP Augmented Diastolic Pressure: 105          Pulses  Right Radial Pulse: 2+ (normal)  Left Radial Pulse: 2+ (normal)  Right Dorsalis Pedis Pulse: 1+ (weak)  Left Dorsalis Pedis Pulse: 1+ (weak)  Right Posterior Tibial Pulse: Doppler  Left Posterior Tibial Pulse: Doppler    Resp:  Flow (L/min): 4  Oxygen Concentration (%): 36    GI/:  GI prophylaxis: yes  Diet/Nutrition Received: consistent carb/diabetic diet  Last Bowel Movement: 11/24/23      Intake/Output Summary (Last 24 hours) at 11/26/2023 0720  Last data filed at 11/26/2023 0634  Gross per 24 hour   Intake 1281.02 ml   Output 1740 ml   Net -458.98 ml        Nutritional Supplement Intake: Quantity None, Type:  None    Labs/Accuchecks:  Recent Labs   Lab 11/24/23  0503 11/25/23  0257 11/26/23  0151   WBC 10.98 10.92 9.28   RBC 3.16* 3.12* 3.11*   HGB 9.0* 8.9* 8.8*   HCT 28.6* 27.5* 27.2*    353 327      Recent Labs   Lab 11/24/23  0503 11/25/23  0257 11/26/23  0151   APTT 49.2* 48.4* 47.8*     "  Recent Labs     11/26/23  0151   *   K 4.2   CO2 25   CL 97   BUN 86*   CREATININE 2.4*   ALKPHOS 85   ALT 21   AST 22   BILITOT 0.6     No results for input(s): "CPK", "CPKMB", "MB", "TROPONINI" in the last 168 hours.   Recent Labs     11/23/23 2043 11/24/23  0726 11/24/23  1947 11/25/23  0807 11/25/23 2016   PH 7.478*  --  7.466*  --   --    PCO2 38.2  --  38.4  --   --    PO2 26*   < > 28* 28* 25*   HCO3 28.3*  --  27.7  --   --    POCSATURATED 53   < > 57 57 49   BE 5*  --  4*  --   --     < > = values in this interval not displayed.       Electrolytes: No replacement orders  Accuchecks: Q4H    Gtts/LDAs:   sodium chloride 0.9%      sodium chloride 0.9% 5 mL/hr at 11/26/23 0601    DOBUTamine 5 mcg/kg/min (11/26/23 0601)    furosemide (LASIX) 500 mg in 50 mL infusion (conc: 10 mg/mL) 10 mg/hr (11/26/23 0601)    heparin (porcine) in D5W 16 Units/kg/hr (11/26/23 0601)    sodium chloride 0.9% 250 mL/hr at 11/20/23 2002       Lines/Drains/Airways       Central Venous Catheter Line  Duration              Introducer Single Lumen 11/20/23 1330 Internal Jugular Left 5 days    Pulmonary Artery Catheter Assessment  11/20/23 1330 Internal Jugular Left 5 days              Drain  Duration                  Urine Pouch  11/20/23 2202 other (see comments) 5 days    Male External Urinary Catheter 11/26/23 0628 <1 day              Line  Duration                  IABP 11/21/23 1048 7.5 Fr. 40 mL 4 days              Peripheral Intravenous Line  Duration                  Midline Catheter Insertion/Assessment  - Single Lumen 11/14/23 1113 Left brachial vein 20g x 10cm 11 days                    Skin/Wounds  Bathing/Skin Care: (S) bath, complete;dressed/undressed;linen changed (11/26/23 0601)  Wounds: No  Wound care consulted: No        Problem: Adjustment to Illness (Heart Failure)  Goal: Optimal Coping  Outcome: Ongoing, Progressing     Problem: Device-Related Complication Risk (Intra-Aortic Balloon Pump)  Goal: Effective, " Safe Device Function  Outcome: Ongoing, Progressing     Problem: Infection (Intra-Aortic Balloon Pump)  Goal: Absence of Infection Signs and Symptoms  Outcome: Ongoing, Progressing

## 2023-11-26 NOTE — PLAN OF CARE
POC reviewed with Radha Abbott and family. Questions and concerns addressed. CVP: 1, 4 and 5.  Svo2: 57 %. IABP continued. See below and flowsheets for full assessment and VS info.       Neuro:  Michelle Coma Scale  Best Eye Response: 4-->(E4) spontaneous  Best Motor Response: 6-->(M6) obeys commands  Best Verbal Response: 5-->(V5) oriented  Eastern Coma Scale Score: 15  Assessment Qualifiers: patient not sedated/intubated  Pupil PERRLA: yes    24 hr Temp:  [97.9 °F (36.6 °C)-98.7 °F (37.1 °C)]     CV:  Rhythm: normal sinus rhythm   DVT prophylaxis: VTE Required Core Measure: Pharmacological prophylaxis initiated/maintained    CVP (mean): 5 mmHg (11/25/23 1505)    Pulmonary Artery Catheter Assessment  11/20/23 1330 Internal Jugular Left-Current Insertion Depth (cm): 64 cm (11/25/23 0705)  PAP: 49/16 (11/25/23 1805)  SVO2 (%): 53 % (11/23/23 1901)    IABP Mode: Auto  IABP Rate: 1:1  IABP Trigger: ECG  IABP Augmented Diastolic Pressure: 89          Pulses  Right Radial Pulse: 2+ (normal)  Left Radial Pulse: 2+ (normal)  Right Dorsalis Pedis Pulse: 1+ (weak)  Left Dorsalis Pedis Pulse: 1+ (weak)  Right Posterior Tibial Pulse: Doppler  Left Posterior Tibial Pulse: Doppler    Resp:  Flow (L/min): 4  Oxygen Concentration (%): 36    GI/:  GI prophylaxis: yes  Diet/Nutrition Received: consistent carb/diabetic diet  Last Bowel Movement: 11/24/23  Voiding Characteristics: voids spontaneously without difficulty, external catheter  [REMOVED]      Urethral Catheter 11/13/23 1800 Straight-tip 16 Fr.-Reason for Continuing Urinary Catheterization: Critically ill in ICU and requiring hourly monitoring of intake/output   Intake/Output Summary (Last 24 hours) at 11/25/2023 1835  Last data filed at 11/25/2023 1805  Gross per 24 hour   Intake 1346.78 ml   Output 2340 ml   Net -993.22 ml        Nutritional Supplement Intake: Quantity 2, Type: Boost    Labs/Accuchecks:  Recent Labs   Lab 11/23/23  0329 11/24/23  0503 11/25/23  0257   WBC  "7.75 10.98 10.92   RBC 2.78* 3.16* 3.12*   HGB 8.1* 9.0* 8.9*   HCT 24.9* 28.6* 27.5*    392 353      Recent Labs   Lab 11/23/23  0329 11/24/23  0503 11/25/23  0257   APTT 44.1* 49.2* 48.4*      Recent Labs     11/25/23  0257 11/25/23  1534    136   K 4.3 4.1   CO2 23 25   CL 96 95   BUN 79* 80*   CREATININE 2.4* 2.4*   ALKPHOS 87  --    ALT 18  --    AST 22  --    BILITOT 0.7  --      No results for input(s): "CPK", "CPKMB", "MB", "TROPONINI" in the last 168 hours.   Recent Labs     11/22/23  2017 11/23/23  0731 11/23/23  2043 11/24/23  0726 11/24/23  1947 11/25/23  0807   PH 7.435  --  7.478*  --  7.466*  --    PCO2 41.2  --  38.2  --  38.4  --    PO2 28*   < > 26* 29* 28* 28*   HCO3 27.7  --  28.3*  --  27.7  --    POCSATURATED 54   < > 53 63 57 57   BE 3*  --  5*  --  4*  --     < > = values in this interval not displayed.       Electrolytes: N/A - electrolytes WDL  Accuchecks: ACHS    Gtts/LDAs:   sodium chloride 0.9%      sodium chloride 0.9% 5 mL/hr at 11/25/23 1805    DOBUTamine 5 mcg/kg/min (11/25/23 1805)    furosemide (LASIX) 500 mg in 50 mL infusion (conc: 10 mg/mL) 10 mg/hr (11/25/23 1805)    heparin (porcine) in D5W 16 Units/kg/hr (11/25/23 1805)    sodium chloride 0.9% 250 mL/hr at 11/20/23 2002       Lines/Drains/Airways       Central Venous Catheter Line  Duration              Introducer Single Lumen 11/20/23 1330 Internal Jugular Left 5 days    Pulmonary Artery Catheter Assessment  11/20/23 1330 Internal Jugular Left 5 days              Drain  Duration                  Urine Pouch  11/20/23 2202 other (see comments) 4 days              Line  Duration                  IABP 11/21/23 1048 7.5 Fr. 40 mL 4 days              Peripheral Intravenous Line  Duration                  Midline Catheter Insertion/Assessment  - Single Lumen 11/14/23 1113 Left brachial vein 20g x 10cm 11 days                    Skin/Wounds  Bathing/Skin Care: bath, complete;dressed/undressed;linen changed (11/25/23 " 0815)  Wounds: No  Wound care consulted: No

## 2023-11-26 NOTE — ASSESSMENT & PLAN NOTE
BG goal 140-180    WBD 0.6 units/kg/day (60/40 split)  - Increase Levemir (Insulin Detemir) to 20 units nightly  (10% increase due to FBG above goal)   - Continue Novolog (Insulin Aspart) 12 units TIDWM and prn for BG excursions MDC SSI (150/25)  - BG checks AC/HS  - Hypoglycemia protocol in place    ** Please notify Endocrine for any change and/or advance in diet**  ** Please call Endocrine for any BG related issues **    Discharge Planning:   TBD. Please notify endocrinology prior to discharge.

## 2023-11-26 NOTE — ASSESSMENT & PLAN NOTE
Pt with known ICM with an EF of 25% on home  presenting with decompensated HF.  Not in cardiogenic shock    RHC 11/14: RA 14, PA 70/35, PCWP 32, CO 4.1, CI 2.1.   Repeat RHC 11/20 RA RA  20  PA 60/29 (39)  PCWP 29    CO  4.6 l/min  CI  2.3 l/min/m2  2D echo 11/21 showed EF 15-20%, mild RV dysfunction, mild MR, LVEDD 6.9     - Home  gtt at 5  - home GDMT: entresto 24/26mg BID (on hold 2/2 IVÁN), Hydralazine 25 q8h being continued  - home diuretics: Was taking lasix 40 bid  - s/p IABP placement 11/21. Augmenting 90s at 1:1  - Continue VAD/OHTx workup. (Leaning towards LVAD) On step 4  -Blood type A+  -PT/OT and nutrition   -Continue current support with  5,  IABP 1:1, continue lasix to 10gtt.   -Monitor strict I/Os  -trend CVP, sv02, and PA pressures

## 2023-11-27 LAB
ALBUMIN SERPL BCP-MCNC: 2.8 G/DL (ref 3.5–5.2)
ALLENS TEST: ABNORMAL
ALP SERPL-CCNC: 78 U/L (ref 55–135)
ALT SERPL W/O P-5'-P-CCNC: 21 U/L (ref 10–44)
ANION GAP SERPL CALC-SCNC: 15 MMOL/L (ref 8–16)
APTT PPP: 53 SEC (ref 21–32)
AST SERPL-CCNC: 25 U/L (ref 10–40)
BASOPHILS # BLD AUTO: 0.03 K/UL (ref 0–0.2)
BASOPHILS NFR BLD: 0.4 % (ref 0–1.9)
BILIRUB SERPL-MCNC: 0.5 MG/DL (ref 0.1–1)
BUN SERPL-MCNC: 78 MG/DL (ref 8–23)
CALCIUM SERPL-MCNC: 9.3 MG/DL (ref 8.7–10.5)
CHLORIDE SERPL-SCNC: 98 MMOL/L (ref 95–110)
CO2 SERPL-SCNC: 22 MMOL/L (ref 23–29)
CREAT SERPL-MCNC: 2.3 MG/DL (ref 0.5–1.4)
DELSYS: ABNORMAL
DIFFERENTIAL METHOD BLD: ABNORMAL
EOSINOPHIL # BLD AUTO: 0.2 K/UL (ref 0–0.5)
EOSINOPHIL NFR BLD: 2.4 % (ref 0–8)
ERYTHROCYTE [DISTWIDTH] IN BLOOD BY AUTOMATED COUNT: 14.1 % (ref 11.5–14.5)
EST. GFR  (NO RACE VARIABLE): 31.5 ML/MIN/1.73 M^2
GLUCOSE SERPL-MCNC: 125 MG/DL (ref 70–110)
HCO3 UR-SCNC: 25.1 MMOL/L (ref 24–28)
HCT VFR BLD AUTO: 26.4 % (ref 40–54)
HCT VFR BLD CALC: 28 %PCV (ref 36–54)
HGB BLD-MCNC: 8.5 G/DL (ref 14–18)
IMM GRANULOCYTES # BLD AUTO: 0.03 K/UL (ref 0–0.04)
IMM GRANULOCYTES NFR BLD AUTO: 0.4 % (ref 0–0.5)
LYMPHOCYTES # BLD AUTO: 0.8 K/UL (ref 1–4.8)
LYMPHOCYTES NFR BLD: 11.1 % (ref 18–48)
MAGNESIUM SERPL-MCNC: 2.5 MG/DL (ref 1.6–2.6)
MCH RBC QN AUTO: 28.1 PG (ref 27–31)
MCHC RBC AUTO-ENTMCNC: 32.2 G/DL (ref 32–36)
MCV RBC AUTO: 87 FL (ref 82–98)
MODE: ABNORMAL
MONOCYTES # BLD AUTO: 0.6 K/UL (ref 0.3–1)
MONOCYTES NFR BLD: 7.8 % (ref 4–15)
NEUTROPHILS # BLD AUTO: 5.8 K/UL (ref 1.8–7.7)
NEUTROPHILS NFR BLD: 77.9 % (ref 38–73)
NRBC BLD-RTO: 0 /100 WBC
PCO2 BLDA: 39.5 MMHG (ref 35–45)
PH SMN: 7.41 [PH] (ref 7.35–7.45)
PHOSPHATE SERPL-MCNC: 4.6 MG/DL (ref 2.7–4.5)
PLATELET # BLD AUTO: 306 K/UL (ref 150–450)
PMV BLD AUTO: 10.3 FL (ref 9.2–12.9)
PO2 BLDA: 27 MMHG (ref 40–60)
PO2 BLDA: 30 MMHG (ref 40–60)
PO2 BLDA: 34 MMHG (ref 40–60)
POC BE: 1 MMOL/L
POC IONIZED CALCIUM: 1.2 MMOL/L (ref 1.06–1.42)
POC SATURATED O2: 53 % (ref 95–100)
POC SATURATED O2: 59 % (ref 95–100)
POC SATURATED O2: 66 % (ref 95–100)
POC TCO2: 26 MMOL/L (ref 24–29)
POCT GLUCOSE: 121 MG/DL (ref 70–110)
POCT GLUCOSE: 140 MG/DL (ref 70–110)
POCT GLUCOSE: 140 MG/DL (ref 70–110)
POCT GLUCOSE: 191 MG/DL (ref 70–110)
POTASSIUM BLD-SCNC: 4.2 MMOL/L (ref 3.5–5.1)
POTASSIUM SERPL-SCNC: 4.3 MMOL/L (ref 3.5–5.1)
PROT SERPL-MCNC: 7.1 G/DL (ref 6–8.4)
RBC # BLD AUTO: 3.03 M/UL (ref 4.6–6.2)
SAMPLE: ABNORMAL
SITE: ABNORMAL
SODIUM BLD-SCNC: 133 MMOL/L (ref 136–145)
SODIUM SERPL-SCNC: 135 MMOL/L (ref 136–145)
VON WILLEBRAND EVAL PPP-IMP: NORMAL
VWF MULTIMERS PPP QL: NORMAL
WBC # BLD AUTO: 7.39 K/UL (ref 3.9–12.7)

## 2023-11-27 PROCEDURE — 85014 HEMATOCRIT: CPT | Mod: NTX

## 2023-11-27 PROCEDURE — 63600175 PHARM REV CODE 636 W HCPCS: Mod: NTX | Performed by: STUDENT IN AN ORGANIZED HEALTH CARE EDUCATION/TRAINING PROGRAM

## 2023-11-27 PROCEDURE — 25000003 PHARM REV CODE 250: Mod: NTX | Performed by: PHYSICIAN ASSISTANT

## 2023-11-27 PROCEDURE — 82800 BLOOD PH: CPT | Mod: NTX

## 2023-11-27 PROCEDURE — 84295 ASSAY OF SERUM SODIUM: CPT | Mod: NTX

## 2023-11-27 PROCEDURE — 80053 COMPREHEN METABOLIC PANEL: CPT | Mod: NTX | Performed by: STUDENT IN AN ORGANIZED HEALTH CARE EDUCATION/TRAINING PROGRAM

## 2023-11-27 PROCEDURE — 82330 ASSAY OF CALCIUM: CPT | Mod: NTX

## 2023-11-27 PROCEDURE — 25000003 PHARM REV CODE 250: Mod: NTX | Performed by: STUDENT IN AN ORGANIZED HEALTH CARE EDUCATION/TRAINING PROGRAM

## 2023-11-27 PROCEDURE — 84100 ASSAY OF PHOSPHORUS: CPT | Mod: NTX | Performed by: STUDENT IN AN ORGANIZED HEALTH CARE EDUCATION/TRAINING PROGRAM

## 2023-11-27 PROCEDURE — 84132 ASSAY OF SERUM POTASSIUM: CPT | Mod: NTX

## 2023-11-27 PROCEDURE — 85025 COMPLETE CBC W/AUTO DIFF WBC: CPT | Mod: NTX | Performed by: STUDENT IN AN ORGANIZED HEALTH CARE EDUCATION/TRAINING PROGRAM

## 2023-11-27 PROCEDURE — 94761 N-INVAS EAR/PLS OXIMETRY MLT: CPT | Mod: NTX,XB

## 2023-11-27 PROCEDURE — 83735 ASSAY OF MAGNESIUM: CPT | Mod: NTX | Performed by: STUDENT IN AN ORGANIZED HEALTH CARE EDUCATION/TRAINING PROGRAM

## 2023-11-27 PROCEDURE — 99292 CRITICAL CARE ADDL 30 MIN: CPT | Mod: NTX,,, | Performed by: INTERNAL MEDICINE

## 2023-11-27 PROCEDURE — 25000003 PHARM REV CODE 250: Mod: NTX | Performed by: REGISTERED NURSE

## 2023-11-27 PROCEDURE — 99900035 HC TECH TIME PER 15 MIN (STAT): Mod: NTX

## 2023-11-27 PROCEDURE — 82803 BLOOD GASES ANY COMBINATION: CPT | Mod: NTX

## 2023-11-27 PROCEDURE — 25000003 PHARM REV CODE 250: Mod: NTX | Performed by: INTERNAL MEDICINE

## 2023-11-27 PROCEDURE — 20000000 HC ICU ROOM: Mod: NTX

## 2023-11-27 PROCEDURE — 85730 THROMBOPLASTIN TIME PARTIAL: CPT | Mod: NTX | Performed by: STUDENT IN AN ORGANIZED HEALTH CARE EDUCATION/TRAINING PROGRAM

## 2023-11-27 PROCEDURE — 27000202 HC IABP, ADD'L DAY: Mod: NTX

## 2023-11-27 PROCEDURE — 99291 CRITICAL CARE FIRST HOUR: CPT | Mod: FS,NTX,, | Performed by: INTERNAL MEDICINE

## 2023-11-27 RX ORDER — TRAMADOL HYDROCHLORIDE 50 MG/1
50 TABLET ORAL ONCE
Status: COMPLETED | OUTPATIENT
Start: 2023-11-27 | End: 2023-11-27

## 2023-11-27 RX ORDER — LACTULOSE 10 G/15ML
15 SOLUTION ORAL ONCE
Status: DISCONTINUED | OUTPATIENT
Start: 2023-11-27 | End: 2023-12-04

## 2023-11-27 RX ADMIN — INSULIN ASPART 2 UNITS: 100 INJECTION, SOLUTION INTRAVENOUS; SUBCUTANEOUS at 01:11

## 2023-11-27 RX ADMIN — PANTOPRAZOLE SODIUM 40 MG: 40 TABLET, DELAYED RELEASE ORAL at 05:11

## 2023-11-27 RX ADMIN — HYDRALAZINE HYDROCHLORIDE 25 MG: 25 TABLET, FILM COATED ORAL at 10:11

## 2023-11-27 RX ADMIN — AMIODARONE HYDROCHLORIDE 400 MG: 200 TABLET ORAL at 09:11

## 2023-11-27 RX ADMIN — Medication 400 MG: at 09:11

## 2023-11-27 RX ADMIN — OXYCODONE HYDROCHLORIDE 5 MG: 5 TABLET ORAL at 10:11

## 2023-11-27 RX ADMIN — TRAMADOL HYDROCHLORIDE 50 MG: 50 TABLET, COATED ORAL at 08:11

## 2023-11-27 RX ADMIN — POLYETHYLENE GLYCOL 3350 17 G: 17 POWDER, FOR SOLUTION ORAL at 09:11

## 2023-11-27 RX ADMIN — HEPARIN SODIUM AND DEXTROSE 16 UNITS/KG/HR: 10000; 5 INJECTION INTRAVENOUS at 02:11

## 2023-11-27 RX ADMIN — HYDRALAZINE HYDROCHLORIDE 25 MG: 25 TABLET, FILM COATED ORAL at 02:11

## 2023-11-27 RX ADMIN — ACETAMINOPHEN 650 MG: 325 TABLET ORAL at 03:11

## 2023-11-27 RX ADMIN — DOBUTAMINE IN DEXTROSE 5 MCG/KG/MIN: 400 INJECTION, SOLUTION INTRAVENOUS at 05:11

## 2023-11-27 RX ADMIN — ATORVASTATIN CALCIUM 40 MG: 40 TABLET, FILM COATED ORAL at 09:11

## 2023-11-27 RX ADMIN — HYDRALAZINE HYDROCHLORIDE 25 MG: 25 TABLET, FILM COATED ORAL at 05:11

## 2023-11-27 RX ADMIN — SENNOSIDES 8.6 MG: 8.6 TABLET, FILM COATED ORAL at 09:11

## 2023-11-27 RX ADMIN — BISACODYL 10 MG: 10 SUPPOSITORY RECTAL at 09:11

## 2023-11-27 RX ADMIN — FUROSEMIDE 10 MG/HR: 10 INJECTION, SOLUTION INTRAMUSCULAR; INTRAVENOUS at 04:11

## 2023-11-27 RX ADMIN — INSULIN ASPART 12 UNITS: 100 INJECTION, SOLUTION INTRAVENOUS; SUBCUTANEOUS at 07:11

## 2023-11-27 RX ADMIN — ASPIRIN 81 MG: 81 TABLET, COATED ORAL at 09:11

## 2023-11-27 RX ADMIN — THERA TABS 1 TABLET: TAB at 09:11

## 2023-11-27 RX ADMIN — OXYCODONE HYDROCHLORIDE 5 MG: 5 TABLET ORAL at 04:11

## 2023-11-27 RX ADMIN — INSULIN ASPART 12 UNITS: 100 INJECTION, SOLUTION INTRAVENOUS; SUBCUTANEOUS at 01:11

## 2023-11-27 NOTE — CARE UPDATE
HTS Care Update Note    8PM     Hemodynamics    CVP:  6  SVO2:  53  CO 5.1  CI 2.6  SVR:  1370  PA catheter 57/21    I/Os    I/O this shift:  In: 964.9 [P.O.:720; I.V.:244.9]  Out: 1000 [Urine:1000]      Intake/Output Summary (Last 24 hours) at 11/26/2023 1802  Last data filed at 11/26/2023 1705  Gross per 24 hour   Intake 1759.39 ml   Output 2200 ml   Net -440.61 ml       Net IO Since Admission: -12,594.24 mL [11/26/23 1802]    Diuretics: Furosemide 10mg/h    Continuous Infusions:      sodium chloride 0.9%      sodium chloride 0.9% 5 mL/hr at 11/26/23 1219    DOBUTamine 5 mcg/kg/min (11/26/23 1705)    furosemide (LASIX) 500 mg in 50 mL infusion (conc: 10 mg/mL) 10 mg/hr (11/26/23 1705)    heparin (porcine) in D5W 16 Units/kg/hr (11/26/23 1731)    sodium chloride 0.9% 250 mL/hr at 11/20/23 2002       Mechanical support: IABP 1:1    Plan:  - No changes made, continue current plan of care  - Plan discussed with attending     Clif Olmedo MD  Cardiovascular Disease PGY-IV  [facility]

## 2023-11-27 NOTE — PROGRESS NOTES
11/27/2023  [place]    Current provider:  Brayan Ocampo MD    Device interrogation:       No data to display                   Rounded on Ledric Abbott to ensure all mechanical assist device settings (IABP or VAD) were appropriate and all parameters were within limits.  I was able to ensure all back up equipment was present, the staff had no issues, and the Perfusion Department daily rounding was complete.      For implantable VADs: Interrogation of Ventricular assist device was performed with analysis of device parameters and review of device function. I have personally reviewed the interrogation findings and agree with findings as stated.     In emergency, the nursing units have been notified to contact the perfusion department either by:  Calling [id number] from 630am to 4pm Mon thru Fri, utilizing the On-Call Finder functionality of Epic and searching for Perfusion, or by contacting the hospital  from 4pm to 630am and on weekends and asking to speak with the perfusionist on call.    1:43 PM

## 2023-11-27 NOTE — PLAN OF CARE
Cardiac ICU Care Plan    POC reviewed with Radha Abbott and family. Questions and concerns addressed. No acute events today. Pt progressing toward goals. Will continue to monitor. See below and flowsheets for full assessment and VS info.       Neuro:  Michelle Coma Scale  Best Eye Response: 4-->(E4) spontaneous  Best Motor Response: 6-->(M6) obeys commands  Best Verbal Response: 5-->(V5) oriented  Michelle Coma Scale Score: 15  Assessment Qualifiers: patient not sedated/intubated  Pupil PERRLA: no    24 hr Temp:  [97.8 °F (36.6 °C)-99.1 °F (37.3 °C)]      CV:  Rhythm: normal sinus rhythm   DVT prophylaxis: VTE Required Core Measure: Pharmacological prophylaxis initiated/maintained    CVP (mean): 8 mmHg (11/27/23 1400)    Pulmonary Artery Catheter Assessment  11/20/23 1330 Internal Jugular Left-Current Insertion Depth (cm): 63.5 cm (11/27/23 0700)  PAP: 62/27 (11/27/23 1500)  SVO2 (%): 53 % (11/23/23 1901)    IABP Mode: Auto  IABP Rate: 1:1  IABP Trigger: ECG  IABP Augmented Diastolic Pressure: 98          Pulses  Right Radial Pulse: 2+ (normal)  Left Radial Pulse: 2+ (normal)  Right Dorsalis Pedis Pulse: 1+ (weak)  Left Dorsalis Pedis Pulse: 1+ (weak)  Right Posterior Tibial Pulse: Doppler  Left Posterior Tibial Pulse: Doppler    Resp:  Flow (L/min): 4  Oxygen Concentration (%): 36    GI/:  GI prophylaxis: yes  Diet/Nutrition Received: consistent carb/diabetic diet, restrict fluids  Last Bowel Movement: 11/27/23  Voiding Characteristics: external catheter   Intake/Output Summary (Last 24 hours) at 11/27/2023 1539  Last data filed at 11/27/2023 1500  Gross per 24 hour   Intake 1528.23 ml   Output 2025 ml   Net -496.77 ml        Nutritional Supplement Intake: Quantity , Type:     Labs/Accuchecks:  Recent Labs   Lab 11/25/23  0257 11/26/23  0151 11/27/23  0211 11/27/23  0837   WBC 10.92 9.28 7.39  --    RBC 3.12* 3.11* 3.03*  --    HGB 8.9* 8.8* 8.5*  --    HCT 27.5* 27.2* 26.4* 28*    327 306  --       Recent  "Labs   Lab 11/25/23  0257 11/26/23  0151 11/27/23  0211   APTT 48.4* 47.8* 53.0*      Recent Labs     11/27/23  0211   *   K 4.3   CO2 22*   CL 98   BUN 78*   CREATININE 2.3*   ALKPHOS 78   ALT 21   AST 25   BILITOT 0.5     No results for input(s): "CPK", "CPKMB", "MB", "TROPONINI" in the last 168 hours.   Recent Labs     11/24/23  1947 11/25/23  0807 11/26/23  0747 11/26/23 2007 11/27/23  0837   PH 7.466*  --   --   --  7.412   PCO2 38.4  --   --   --  39.5   PO2 28*   < > 29* 28* 34*   HCO3 27.7  --   --   --  25.1   POCSATURATED 57   < > 59 53 66   BE 4*  --   --   --  1    < > = values in this interval not displayed.       Electrolytes: N/A - electrolytes WDL  Accuchecks: ACHS    Gtts/LDAs:   sodium chloride 0.9%      sodium chloride 0.9% 5 mL/hr at 11/27/23 1500    DOBUTamine 5 mcg/kg/min (11/27/23 1500)    furosemide (LASIX) 500 mg in 50 mL infusion (conc: 10 mg/mL) 10 mg/hr (11/27/23 1500)    heparin (porcine) in D5W 16 Units/kg/hr (11/27/23 1500)    sodium chloride 0.9% 250 mL/hr at 11/20/23 2002       Lines/Drains/Airways       Central Venous Catheter Line  Duration              Introducer Single Lumen 11/20/23 1330 Internal Jugular Left 7 days    Pulmonary Artery Catheter Assessment  11/20/23 1330 Internal Jugular Left 7 days              Drain  Duration                  Urine Pouch  11/20/23 2202 other (see comments) 6 days    Male External Urinary Catheter 11/26/23 0628 1 day              Line  Duration                  IABP 11/21/23 1048 7.5 Fr. 40 mL 6 days              Peripheral Intravenous Line  Duration                  Midline Catheter Insertion/Assessment  - Single Lumen 11/14/23 1113 Left brachial vein 20g x 10cm 13 days                    Skin/Wounds  Bathing/Skin Care: back care;incontinence care (11/27/23 1500)  Wounds: Yes  Wound care consulted: Yes   "

## 2023-11-27 NOTE — ASSESSMENT & PLAN NOTE
IVÁN on CKD2. Baseline Cr of 1.7-2.0.   -Currently 2.3 and down trending   - Trend along with CVP, continue diuresis with Lasix gtt @10gtt  - Hold ARNi, entresto  -Trend BMPs daily

## 2023-11-27 NOTE — PROGRESS NOTES
[facility] - Cardiac Intensive Care  Heart Transplant  Progress Note    Patient Name: Radha Abbott  MRN: [id number]  Admission Date: 11/9/2023  Hospital Length of Stay: 18 days  Attending Physician: Brayan Ocampo MD  Primary Care Provider: Vasu Kong MD  Principal Problem:Acute on chronic combined systolic and diastolic CHF, NYHA class 4    Subjective:   Interval History: No acute events overnight. Some c/o constipation this AM. Bowel regimen increased. Renal function remains elevated but trending down. Net negative fluid balance on Lasix gtt at 10mg/hr. Support with IABP @ 1:1 and  5. Awaiting LVAD date.    Hemodynamics:   CVP 4  PA 44/18  CO 7.3  CI 3.75      Continuous Infusions:   sodium chloride 0.9%      sodium chloride 0.9% 5 mL/hr at 11/27/23 1100    DOBUTamine 5 mcg/kg/min (11/27/23 1100)    furosemide (LASIX) 500 mg in 50 mL infusion (conc: 10 mg/mL) 10 mg/hr (11/27/23 1100)    heparin (porcine) in D5W 16 Units/kg/hr (11/27/23 1100)    sodium chloride 0.9% 250 mL/hr at 11/20/23 2002     Scheduled Meds:   amiodarone  400 mg Oral Daily    aspirin  81 mg Oral Daily    atorvastatin  40 mg Oral Daily    bisacodyL  10 mg Rectal Daily    hydrALAZINE  25 mg Oral Q8H    insulin aspart U-100  12 Units Subcutaneous TIDWM    insulin detemir U-100  20 Units Subcutaneous QHS    lactulose  15 g Oral Once    LIDOcaine  2 patch Transdermal Q24H    magnesium oxide  400 mg Oral Daily    multivitamin  1 tablet Oral Daily    pantoprazole  40 mg Oral Before breakfast    polyethylene glycol  17 g Oral Daily    senna  8.6 mg Oral Daily     PRN Meds:0.9%  NaCl infusion (for blood administration), sodium chloride 0.9%, acetaminophen, baclofen, dextrose 10%, dextrose 10%, gabapentin, glucagon (human recombinant), glucose, glucose, heparin (PORCINE), heparin (PORCINE), insulin aspart U-100, ondansetron, oxyCODONE, senna-docusate 8.6-50 mg, sodium chloride 0.9%, sodium chloride 0.9%    Review of patient's allergies  "indicates:  No Known Allergies  Objective:     Vital Signs (Most Recent):  Temp: 98.1 °F (36.7 °C) (11/27/23 1100)  Pulse: 84 (11/27/23 1100)  Resp: (!) 26 (11/27/23 1100)  BP: 106/66 (11/27/23 1100)  SpO2: 95 % (11/27/23 1100) Vital Signs (24h Range):  Temp:  [97.8 °F (36.6 °C)-99.1 °F (37.3 °C)] 98.1 °F (36.7 °C)  Pulse:  [83-94] 84  Resp:  [10-29] 26  SpO2:  [94 %-100 %] 95 %  BP: ()/(53-90) 106/66     Patient Vitals for the past 72 hrs (Last 3 readings):   Weight   11/27/23 0400 76.5 kg (168 lb 10.4 oz)   11/26/23 0530 77 kg (169 lb 12.1 oz)   11/25/23 0400 77 kg (169 lb 12.1 oz)     Body mass index is 22.87 kg/m².      Intake/Output Summary (Last 24 hours) at 11/27/2023 1125  Last data filed at 11/27/2023 1100  Gross per 24 hour   Intake 1513.46 ml   Output 1925 ml   Net -411.54 ml       Hemodynamic Parameters:  PAP: (46-64)/(17-27) 59/23  PAP (Mean):  [29 mmHg-43 mmHg] 38 mmHg    Telemetry: NSR        Physical Exam  Constitutional:       Appearance: Normal appearance. He is ill-appearing.   HENT:      Head: Normocephalic.   Cardiovascular:      Rate and Rhythm: Normal rate and regular rhythm.      Pulses: Normal pulses.      Comments: IABP   Pulmonary:      Effort: Pulmonary effort is normal.      Breath sounds: Normal breath sounds.   Abdominal:      General: Bowel sounds are normal.      Palpations: Abdomen is soft.   Musculoskeletal:         General: Normal range of motion.      Cervical back: Neck supple.   Skin:     General: Skin is warm and dry.   Neurological:      General: No focal deficit present.      Mental Status: He is alert and oriented to person, place, and time.            Significant Labs:  CBC:  Recent Labs   Lab 11/25/23  0257 11/26/23  0151 11/27/23  0211   WBC 10.92 9.28 7.39   RBC 3.12* 3.11* 3.03*   HGB 8.9* 8.8* 8.5*   HCT 27.5* 27.2* 26.4*    327 306   MCV 88 88 87   MCH 28.5 28.3 28.1   MCHC 32.4 32.4 32.2     BNP:  No results for input(s): "BNP" in the last 168 " "hours.    Invalid input(s): "BNPTRIAGELBLO"  CMP:  Recent Labs   Lab 11/25/23  0257 11/25/23  1534 11/26/23  0151 11/26/23  1618 11/27/23 0211   *   < > 129* 147* 125*   CALCIUM 9.9   < > 9.5 9.7 9.3   ALBUMIN 3.1*  --  2.9*  --  2.8*   PROT 7.4  --  7.2  --  7.1      < > 135* 135* 135*   K 4.3   < > 4.2 4.3 4.3   CO2 23   < > 25 23 22*   CL 96   < > 97 96 98   BUN 79*   < > 86* 75* 78*   CREATININE 2.4*   < > 2.4* 2.3* 2.3*   ALKPHOS 87  --  85  --  78   ALT 18  --  21  --  21   AST 22  --  22  --  25   BILITOT 0.7  --  0.6  --  0.5    < > = values in this interval not displayed.      Coagulation:   Recent Labs   Lab 11/25/23 0257 11/26/23 0151 11/27/23 0211   APTT 48.4* 47.8* 53.0*     LDH:  No results for input(s): "LDH" in the last 72 hours.  Microbiology:  Microbiology Results (last 7 days)       ** No results found for the last 168 hours. **            BMP:   Recent Labs   Lab 11/27/23 0211   *   *   K 4.3   CL 98   CO2 22*   BUN 78*   CREATININE 2.3*   CALCIUM 9.3   MG 2.5     I have reviewed all pertinent labs within the past 24 hours.    Estimated Creatinine Clearance: 36.5 mL/min (A) (based on SCr of 2.3 mg/dL (H)).    Diagnostic Results:  I have reviewed all pertinent imaging results/findings within the past 24 hours.  Assessment and Plan:     63 year old male with hx of CAD s/p 3v CABG (unclear anatomy, 2009), ICM with a recent EF of 15-20% s/p ICD (medtronik 2009), DM2 (a1c 7.7), HTN, HLD, Vfib on amio who presents to the ED with CC of SOB     Pt was recently admitted to [facility] as a transfer.  He was started on a Lasix gtt and did well.  Started on gDMT and discharged home on  5 with plans to follow up in HTS clinic for ongoing transplant evaluation at another facility.  He says that about a few days ago he started to notice LE swelling.  This turned into Nelson and orthopnea.  He says he can't walk to the bathroom without getting SOB.  He also complains of weight gain.  He " takes torsemide 20mg BID at home and was told to trial additional Lasix which he did without any improvement.  He was rx metolazone but has not been filled.  He came to the ED     In the ED he was AF with stable VS on RA.  CBC showing chronic anemia.  CMP notable for acute on chronic CKD with baseline around 2.1 and Cr now 2.6.  BNP elevated.  Lactate neg.  CXR showing pulmonary edema.  I evaluated the pt at the bedside.  Bedside TTE showing CVP >15.  He was subsequently admitted to the CCU for diuresis.     He was continued on his home  and was started on a lasix gtt, which he responded well to overnight (net -1700cc. He feels much better this morning as well. Our transplant coordinators have been working on insurance approval and he is now being transferred to [facility] service for transplant evaluation.     * Acute on chronic combined systolic and diastolic CHF, NYHA class 4  Pt with known ICM with an EF of 25% on home  presenting with decompensated HF.  Not in cardiogenic shock    RHC 11/14: RA 14, PA 70/35, PCWP 32, CO 4.1, CI 2.1.   Repeat RHC 11/20 RA RA  20  PA 60/29 (39)  PCWP 29    CO  4.6 l/min  CI  2.3 l/min/m2  2D echo 11/21 showed EF 15-20%, mild RV dysfunction, mild MR, LVEDD 6.9     Repeat on 11/24 showed EF 10-15%, RV mildly reduced, mild-mod MR, LVEDD 6.42  - Home  gtt at 5  - home GDMT: entresto 24/26mg BID (on hold 2/2 IVÁN), Hydralazine 25 q8h being continued  - home diuretics: Was taking lasix 40 bid  - s/p IABP placement 11/21. Augmenting 90s at 1:1  - Continue VAD/OHTx workup. (Awaiting OR date)   -Blood type A+  -PT/OT and nutrition   -Continue current support with  5,  IABP 1:1, continue lasix to 10gtt.   -Monitor strict I/Os  -trend CVP, sv02, and PA pressures  -Plans for LVAD placement once renal function is optimal       PAF (paroxysmal atrial fibrillation)  Known hx of pAF. In sinus rhythm   - Continue home amiodarone 400mg qd  - Stop home Eliquis and continue heparin gtt while in  the hospital.     IVÁN (acute kidney injury)  IVÁN on CKD2. Baseline Cr of 1.7-2.0.   -Currently 2.3 and down trending   - Trend along with CVP, continue diuresis with Lasix gtt @10gtt  - Hold ARNi, entresto  -Trend BMPs daily     Type 2 diabetes mellitus without complication, without long-term current use of insulin  -A1c 7.6, not insulin dependent  - MDSSI  -diabetic diet ordered/Boost glucose control   - Endocrine following, appreciate assistance with blood glucose management    CAD (coronary artery disease)  -S/p 3vCABG in 2009  - continue home ASA, HI statin    Ventricular tachycardia  Hx VT s/p ICD placement (medtronic 2009)  - Continue home amio 400mg qd    .Uninterrupted Critical Care/Counseling Time (not including procedures): 60 mins       Hugh Duran NP  Heart Transplant  [facility] - Cardiac Intensive Care

## 2023-11-27 NOTE — SUBJECTIVE & OBJECTIVE
Interval History: No acute events overnight. Some c/o constipation this AM. Bowel regimen increased. Renal function remains elevated but trending down. Net negative fluid balance on Lasix gtt at 10mg/hr. Support with IABP @ 1:1 and  5. Awaiting LVAD date.    Hemodynamics:   CVP 4  PA 44/18  CO 7.3  CI 3.75      Continuous Infusions:   sodium chloride 0.9%      sodium chloride 0.9% 5 mL/hr at 11/27/23 1100    DOBUTamine 5 mcg/kg/min (11/27/23 1100)    furosemide (LASIX) 500 mg in 50 mL infusion (conc: 10 mg/mL) 10 mg/hr (11/27/23 1100)    heparin (porcine) in D5W 16 Units/kg/hr (11/27/23 1100)    sodium chloride 0.9% 250 mL/hr at 11/20/23 2002     Scheduled Meds:   amiodarone  400 mg Oral Daily    aspirin  81 mg Oral Daily    atorvastatin  40 mg Oral Daily    bisacodyL  10 mg Rectal Daily    hydrALAZINE  25 mg Oral Q8H    insulin aspart U-100  12 Units Subcutaneous TIDWM    insulin detemir U-100  20 Units Subcutaneous QHS    lactulose  15 g Oral Once    LIDOcaine  2 patch Transdermal Q24H    magnesium oxide  400 mg Oral Daily    multivitamin  1 tablet Oral Daily    pantoprazole  40 mg Oral Before breakfast    polyethylene glycol  17 g Oral Daily    senna  8.6 mg Oral Daily     PRN Meds:0.9%  NaCl infusion (for blood administration), sodium chloride 0.9%, acetaminophen, baclofen, dextrose 10%, dextrose 10%, gabapentin, glucagon (human recombinant), glucose, glucose, heparin (PORCINE), heparin (PORCINE), insulin aspart U-100, ondansetron, oxyCODONE, senna-docusate 8.6-50 mg, sodium chloride 0.9%, sodium chloride 0.9%    Review of patient's allergies indicates:  No Known Allergies  Objective:     Vital Signs (Most Recent):  Temp: 98.1 °F (36.7 °C) (11/27/23 1100)  Pulse: 84 (11/27/23 1100)  Resp: (!) 26 (11/27/23 1100)  BP: 106/66 (11/27/23 1100)  SpO2: 95 % (11/27/23 1100) Vital Signs (24h Range):  Temp:  [97.8 °F (36.6 °C)-99.1 °F (37.3 °C)] 98.1 °F (36.7 °C)  Pulse:  [83-94] 84  Resp:  [10-29] 26  SpO2:   "[94 %-100 %] 95 %  BP: ()/(53-90) 106/66     Patient Vitals for the past 72 hrs (Last 3 readings):   Weight   11/27/23 0400 76.5 kg (168 lb 10.4 oz)   11/26/23 0530 77 kg (169 lb 12.1 oz)   11/25/23 0400 77 kg (169 lb 12.1 oz)     Body mass index is 22.87 kg/m².      Intake/Output Summary (Last 24 hours) at 11/27/2023 1125  Last data filed at 11/27/2023 1100  Gross per 24 hour   Intake 1513.46 ml   Output 1925 ml   Net -411.54 ml       Hemodynamic Parameters:  PAP: (46-64)/(17-27) 59/23  PAP (Mean):  [29 mmHg-43 mmHg] 38 mmHg    Telemetry: NSR        Physical Exam  Constitutional:       Appearance: Normal appearance. He is ill-appearing.   HENT:      Head: Normocephalic.   Cardiovascular:      Rate and Rhythm: Normal rate and regular rhythm.      Pulses: Normal pulses.      Comments: IABP   Pulmonary:      Effort: Pulmonary effort is normal.      Breath sounds: Normal breath sounds.   Abdominal:      General: Bowel sounds are normal.      Palpations: Abdomen is soft.   Musculoskeletal:         General: Normal range of motion.      Cervical back: Neck supple.   Skin:     General: Skin is warm and dry.   Neurological:      General: No focal deficit present.      Mental Status: He is alert and oriented to person, place, and time.            Significant Labs:  CBC:  Recent Labs   Lab 11/25/23 0257 11/26/23 0151 11/27/23 0211   WBC 10.92 9.28 7.39   RBC 3.12* 3.11* 3.03*   HGB 8.9* 8.8* 8.5*   HCT 27.5* 27.2* 26.4*    327 306   MCV 88 88 87   MCH 28.5 28.3 28.1   MCHC 32.4 32.4 32.2     BNP:  No results for input(s): "BNP" in the last 168 hours.    Invalid input(s): "BNPTRIAGELBLO"  CMP:  Recent Labs   Lab 11/25/23  0257 11/25/23  1534 11/26/23  0151 11/26/23  1618 11/27/23 0211   *   < > 129* 147* 125*   CALCIUM 9.9   < > 9.5 9.7 9.3   ALBUMIN 3.1*  --  2.9*  --  2.8*   PROT 7.4  --  7.2  --  7.1      < > 135* 135* 135*   K 4.3   < > 4.2 4.3 4.3   CO2 23   < > 25 23 22*   CL 96   < > 97 96 " "98   BUN 79*   < > 86* 75* 78*   CREATININE 2.4*   < > 2.4* 2.3* 2.3*   ALKPHOS 87  --  85  --  78   ALT 18  --  21  --  21   AST 22  --  22  --  25   BILITOT 0.7  --  0.6  --  0.5    < > = values in this interval not displayed.      Coagulation:   Recent Labs   Lab 11/25/23 0257 11/26/23 0151 11/27/23 0211   APTT 48.4* 47.8* 53.0*     LDH:  No results for input(s): "LDH" in the last 72 hours.  Microbiology:  Microbiology Results (last 7 days)       ** No results found for the last 168 hours. **            BMP:   Recent Labs   Lab 11/27/23 0211   *   *   K 4.3   CL 98   CO2 22*   BUN 78*   CREATININE 2.3*   CALCIUM 9.3   MG 2.5     I have reviewed all pertinent labs within the past 24 hours.    Estimated Creatinine Clearance: 36.5 mL/min (A) (based on SCr of 2.3 mg/dL (H)).    Diagnostic Results:  I have reviewed all pertinent imaging results/findings within the past 24 hours.  "

## 2023-11-27 NOTE — PROGRESS NOTES
Nutritional Update:    11/27/23: Spoke w/ pt and pt's caregiver at bedside, reports getting outside food which caregiver brings such as (gumbo), reiterated staying within diet guidelines. Pt not consuming much hospital food, (sometimes consumes 50% of food) gave healthier options for pt to have in hospital. Pt reports drinking 1-2 Boost per day. Recommendations provided below for best optimization of nutrition status at this time.     Recommendations  Continue diabetic diet- Fluid per MD  Continue Boost GC supplements for optimization of protein intake   RD following

## 2023-11-27 NOTE — ASSESSMENT & PLAN NOTE
Pt with known ICM with an EF of 25% on home  presenting with decompensated HF.  Not in cardiogenic shock    RHC 11/14: RA 14, PA 70/35, PCWP 32, CO 4.1, CI 2.1.   Repeat RHC 11/20 RA RA  20  PA 60/29 (39)  PCWP 29    CO  4.6 l/min  CI  2.3 l/min/m2  2D echo 11/21 showed EF 15-20%, mild RV dysfunction, mild MR, LVEDD 6.9     Repeat on 11/24 showed EF 10-15%, RV mildly reduced, mild-mod MR, LVEDD 6.42  - Home  gtt at 5  - home GDMT: entresto 24/26mg BID (on hold 2/2 IVÁN), Hydralazine 25 q8h being continued  - home diuretics: Was taking lasix 40 bid  - s/p IABP placement 11/21. Augmenting 90s at 1:1  - Continue VAD/OHTx workup. (Awaiting OR date)   -Blood type A+  -PT/OT and nutrition   -Continue current support with  5,  IABP 1:1, continue lasix to 10gtt.   -Monitor strict I/Os  -trend CVP, sv02, and PA pressures  -Plans for LVAD placement once renal function is optimal

## 2023-11-27 NOTE — NURSING
Rounded on patient, resting in bed w/ IABP in place, no family/caregiver bedside, patient is AAOx4. States he told his wife to get out of the room and get some fresh air for a little bit. Patient is eager to have a VAD implant so that he can begin the recovery process. No questions at this time. Will continue to follow.

## 2023-11-27 NOTE — PLAN OF CARE
Cardiac ICU Care Plan    NAEON   CVP 6,5,4   SvO2 53    POC reviewed with Radha Abbott and family. Questions and concerns addressed. No acute events today. Pt progressing toward goals. Will continue to monitor. See below and flowsheets for full assessment and VS info.       Neuro:  [place] Coma Scale  Best Eye Response: 4-->(E4) spontaneous  Best Motor Response: 6-->(M6) obeys commands  Best Verbal Response: 5-->(V5) oriented  Michelle Coma Scale Score: 15  Assessment Qualifiers: patient not sedated/intubated  Pupil PERRLA: yes    24 hr Temp:  [97.8 °F (36.6 °C)-99.1 °F (37.3 °C)]      CV:  Rhythm: normal sinus rhythm   DVT prophylaxis: VTE Required Core Measure: Pharmacological prophylaxis initiated/maintained    CVP (mean): 4 mmHg (11/27/23 0301)    Pulmonary Artery Catheter Assessment  11/20/23 1330 Internal Jugular Left-Current Insertion Depth (cm): 63.5 cm (11/26/23 1901)  PAP: 57/21 (11/26/23 1901)  SVO2 (%): 53 % (11/23/23 1901)    IABP Mode: Auto  IABP Rate: 1:1  IABP Trigger: ECG  IABP Augmented Diastolic Pressure: 85          Pulses  Right Radial Pulse: 2+ (normal)  Left Radial Pulse: 2+ (normal)  Right Dorsalis Pedis Pulse: 1+ (weak)  Left Dorsalis Pedis Pulse: 1+ (weak)  Right Posterior Tibial Pulse: Doppler  Left Posterior Tibial Pulse: Doppler    Resp:  Flow (L/min): 4  Oxygen Concentration (%): 36    GI/:  GI prophylaxis: yes  Diet/Nutrition Received: consistent carb/diabetic diet, restrict fluids (2L FR)  Last Bowel Movement: 11/24/23  Voiding Characteristics: external catheter, voids spontaneously without difficulty   Intake/Output Summary (Last 24 hours) at 11/27/2023 0432  Last data filed at 11/27/2023 0401  Gross per 24 hour   Intake 1626.58 ml   Output 2675 ml   Net -1048.42 ml        Nutritional Supplement Intake: Quantity 0, Type: Boost    Labs/Accuchecks:  Recent Labs   Lab 11/25/23  0257 11/26/23  0151 11/27/23  0211   WBC 10.92 9.28 7.39   RBC 3.12* 3.11* 3.03*   HGB 8.9* 8.8* 8.5*   HCT  "27.5* 27.2* 26.4*    327 306      Recent Labs   Lab 11/25/23  0257 11/26/23  0151 11/27/23  0211   APTT 48.4* 47.8* 53.0*      Recent Labs     11/27/23 0211   *   K 4.3   CO2 22*   CL 98   BUN 78*   CREATININE 2.3*   ALKPHOS 78   ALT 21   AST 25   BILITOT 0.5     No results for input(s): "CPK", "CPKMB", "MB", "TROPONINI" in the last 168 hours.   Recent Labs     11/24/23  1947 11/25/23  0807 11/25/23 2016 11/26/23  0747 11/26/23 2007   PH 7.466*  --   --   --   --    PCO2 38.4  --   --   --   --    PO2 28*   < > 25* 29* 28*   HCO3 27.7  --   --   --   --    POCSATURATED 57   < > 49 59 53   BE 4*  --   --   --   --     < > = values in this interval not displayed.       Electrolytes: No replacement orders  Accuchecks: ACHS    Gtts/LDAs:   sodium chloride 0.9%      sodium chloride 0.9% 5 mL/hr at 11/27/23 0301    DOBUTamine 5 mcg/kg/min (11/27/23 0301)    furosemide (LASIX) 500 mg in 50 mL infusion (conc: 10 mg/mL) 10 mg/hr (11/27/23 0301)    heparin (porcine) in D5W 16 Units/kg/hr (11/27/23 0301)    sodium chloride 0.9% 250 mL/hr at 11/20/23 2002       Lines/Drains/Airways       Central Venous Catheter Line  Duration              Introducer Single Lumen 11/20/23 1330 Internal Jugular Left 6 days    Pulmonary Artery Catheter Assessment  11/20/23 1330 Internal Jugular Left 6 days              Drain  Duration                  Urine Pouch  11/20/23 2202 other (see comments) 6 days    Male External Urinary Catheter 11/26/23 0628 <1 day              Line  Duration                  IABP 11/21/23 1048 7.5 Fr. 40 mL 5 days              Peripheral Intravenous Line  Duration                  Midline Catheter Insertion/Assessment  - Single Lumen 11/14/23 1113 Left brachial vein 20g x 10cm 12 days                    Skin/Wounds  Bathing/Skin Care: (S) bath, complete;dressed/undressed;linen changed (11/26/23 0601)  Wounds: No  Wound care consulted: No       Problem: Adult Inpatient Plan of Care  Goal: Plan of Care " Review  Outcome: Ongoing, Progressing

## 2023-11-27 NOTE — CARE UPDATE
-Glucose Goal 140-180    -A1C:   Hemoglobin A1C   Date Value Ref Range Status   10/12/2023 7.6 (H) 4.0 - 5.6 % Final     Comment:     ADA Screening Guidelines:  5.7-6.4%  Consistent with prediabetes  >or=6.5%  Consistent with diabetes    High levels of fetal hemoglobin interfere with the HbA1C  assay. Heterozygous hemoglobin variants (HbS, HgC, etc)do  not significantly interfere with this assay.   However, presence of multiple variants may affect accuracy.           -HOME REGIMEN:   Metformin (off since October)     -GLUCOSE TREND FOR THE PAST 24HRS:   Recent Labs   Lab 11/26/23  0112 11/26/23  0810 11/26/23  1251 11/26/23  1649 11/26/23  1936 11/27/23  0715   POCTGLUCOSE 133* 207* 154* 170* 141* 121*         -NO HYPOGYCEMIAS NOTED     - Diet  Diet diabetic 2800 Calorie; Fluid - 2000mL; Standard Tray    -TOLERATING 75 % OF PO DIET     -NPO? No    -Steroids - None      -Tube Feeds - None         Plan:   Levemir (Insulin Detemir)  20 units nightly  - Continue Novolog (Insulin Aspart) 12 units TIDWM and prn for BG excursions [facility] SSI (150/25)  - BG checks AC/HS  - Hypoglycemia protocol in place     ** Please notify Endocrine for any change and/or advance in diet**  ** Please call Endocrine for any BG related issues **     Discharge Planning:   TBD. Please notify endocrinology prior to discharge.

## 2023-11-27 NOTE — PT/OT/SLP PROGRESS
Physical Therapy      Patient Name:  Radha Abbott   MRN:  [id number]    Patient not seen today secondary to  (pt not seen due to interview with pt wife, they are independent with UE ther exer program with [employer]. will follow up at end of week.). Will follow-up at a later date.    11/27/2023  .

## 2023-11-27 NOTE — PLAN OF CARE
POC reviewed with Radha Abbott and family. Questions and concerns addressed. CVP: 4, 4 and 5.  Svo2: 59%. IABP continued at 1:1.  See below and flowsheets for full assessment and VS info.       Neuro:  [place] Coma Scale  Best Eye Response: 4-->(E4) spontaneous  Best Motor Response: 6-->(M6) obeys commands  Best Verbal Response: 5-->(V5) oriented  Michelle Coma Scale Score: 15  Assessment Qualifiers: patient not sedated/intubated  Pupil PERRLA: yes    24 hr Temp:  [98.1 °F (36.7 °C)-99 °F (37.2 °C)]     CV:  Rhythm: normal sinus rhythm   DVT prophylaxis: VTE Required Core Measure: Pharmacological prophylaxis initiated/maintained    CVP (mean): 5 mmHg (11/26/23 1505)    Pulmonary Artery Catheter Assessment  11/20/23 1330 Internal Jugular Left-Current Insertion Depth (cm): 64 cm (11/26/23 0705)  PAP: 49/17 (11/26/23 1805)  SVO2 (%): 53 % (11/23/23 1901)    IABP Mode: Auto  IABP Rate: 1:1  IABP Trigger: ECG  IABP Augmented Diastolic Pressure: 91          Pulses  Right Radial Pulse: 2+ (normal)  Left Radial Pulse: 2+ (normal)  Right Dorsalis Pedis Pulse: 1+ (weak)  Left Dorsalis Pedis Pulse: 1+ (weak)  Right Posterior Tibial Pulse: Doppler  Left Posterior Tibial Pulse: Doppler    Resp:  Flow (L/min): 4  Oxygen Concentration (%): 36    GI/:  GI prophylaxis: yes  Diet/Nutrition Received: consistent carb/diabetic diet  Last Bowel Movement: 11/24/23  Voiding Characteristics: voids spontaneously without difficulty, external catheter  [REMOVED]      Urethral Catheter 11/13/23 1800 Straight-tip 16 Fr.-Reason for Continuing Urinary Catheterization: Critically ill in ICU and requiring hourly monitoring of intake/output   Intake/Output Summary (Last 24 hours) at 11/26/2023 1830  Last data filed at 11/26/2023 1805  Gross per 24 hour   Intake 1754.32 ml   Output 1800 ml   Net -45.68 ml        Nutritional Supplement Intake: Quantity 1, Type: Boost    Labs/Accuchecks:  Recent Labs   Lab 11/24/23  0503 11/25/23  0257 11/26/23  0151  "  WBC 10.98 10.92 9.28   RBC 3.16* 3.12* 3.11*   HGB 9.0* 8.9* 8.8*   HCT 28.6* 27.5* 27.2*    353 327      Recent Labs   Lab 11/24/23  0503 11/25/23  0257 11/26/23  0151   APTT 49.2* 48.4* 47.8*      Recent Labs     11/26/23  0151 11/26/23  1618   * 135*   K 4.2 4.3   CO2 25 23   CL 97 96   BUN 86* 75*   CREATININE 2.4* 2.3*   ALKPHOS 85  --    ALT 21  --    AST 22  --    BILITOT 0.6  --      No results for input(s): "CPK", "CPKMB", "MB", "TROPONINI" in the last 168 hours.   Recent Labs     11/23/23  2043 11/24/23  0726 11/24/23  1947 11/25/23  0807 11/25/23 2016 11/26/23  0747   PH 7.478*  --  7.466*  --   --   --    PCO2 38.2  --  38.4  --   --   --    PO2 26*   < > 28* 28* 25* 29*   HCO3 28.3*  --  27.7  --   --   --    POCSATURATED 53   < > 57 57 49 59   BE 5*  --  4*  --   --   --     < > = values in this interval not displayed.       Electrolytes: N/A - electrolytes WDL  Accuchecks: ACHS    Gtts/LDAs:   sodium chloride 0.9%      sodium chloride 0.9% 5 mL/hr at 11/26/23 1805    DOBUTamine 5 mcg/kg/min (11/26/23 1805)    furosemide (LASIX) 500 mg in 50 mL infusion (conc: 10 mg/mL) 10 mg/hr (11/26/23 1805)    heparin (porcine) in D5W 16 Units/kg/hr (11/26/23 1805)    sodium chloride 0.9% 250 mL/hr at 11/20/23 2002       Lines/Drains/Airways       Central Venous Catheter Line  Duration              Introducer Single Lumen 11/20/23 1330 Internal Jugular Left 6 days    Pulmonary Artery Catheter Assessment  11/20/23 1330 Internal Jugular Left 6 days              Drain  Duration                  Urine Pouch  11/20/23 2202 other (see comments) 5 days    Male External Urinary Catheter 11/26/23 0628 <1 day              Line  Duration                  IABP 11/21/23 1048 7.5 Fr. 40 mL 5 days              Peripheral Intravenous Line  Duration                  Midline Catheter Insertion/Assessment  - Single Lumen 11/14/23 1113 Left brachial vein 20g x 10cm 12 days                    Skin/Wounds  Bathing/Skin " Care: (S) bath, complete;dressed/undressed;linen changed (11/26/23 0601)  Wounds: No  Wound care consulted: No

## 2023-11-28 ENCOUNTER — TELEPHONE (OUTPATIENT)
Dept: ADMINISTRATIVE | Facility: HOSPITAL | Age: 63
End: 2023-11-28
Payer: MEDICAID

## 2023-11-28 ENCOUNTER — CONFERENCE (OUTPATIENT)
Dept: TRANSPLANT | Facility: CLINIC | Age: 63
End: 2023-11-28
Payer: MEDICAID

## 2023-11-28 LAB
ALBUMIN SERPL BCP-MCNC: 2.9 G/DL (ref 3.5–5.2)
ALLENS TEST: ABNORMAL
ALLENS TEST: ABNORMAL
ALP SERPL-CCNC: 79 U/L (ref 55–135)
ALT SERPL W/O P-5'-P-CCNC: 21 U/L (ref 10–44)
ANION GAP SERPL CALC-SCNC: 13 MMOL/L (ref 8–16)
ANION GAP SERPL CALC-SCNC: 15 MMOL/L (ref 8–16)
APTT PPP: 49.3 SEC (ref 21–32)
AST SERPL-CCNC: 25 U/L (ref 10–40)
BASOPHILS # BLD AUTO: 0.05 K/UL (ref 0–0.2)
BASOPHILS NFR BLD: 0.7 % (ref 0–1.9)
BILIRUB SERPL-MCNC: 0.4 MG/DL (ref 0.1–1)
BUN SERPL-MCNC: 69 MG/DL (ref 8–23)
BUN SERPL-MCNC: 74 MG/DL (ref 8–23)
CALCIUM SERPL-MCNC: 9.1 MG/DL (ref 8.7–10.5)
CALCIUM SERPL-MCNC: 9.7 MG/DL (ref 8.7–10.5)
CHLORIDE SERPL-SCNC: 98 MMOL/L (ref 95–110)
CHLORIDE SERPL-SCNC: 98 MMOL/L (ref 95–110)
CO2 SERPL-SCNC: 23 MMOL/L (ref 23–29)
CO2 SERPL-SCNC: 24 MMOL/L (ref 23–29)
CREAT SERPL-MCNC: 2.2 MG/DL (ref 0.5–1.4)
CREAT SERPL-MCNC: 2.2 MG/DL (ref 0.5–1.4)
DELSYS: ABNORMAL
DIFFERENTIAL METHOD BLD: ABNORMAL
EOSINOPHIL # BLD AUTO: 0.2 K/UL (ref 0–0.5)
EOSINOPHIL NFR BLD: 2.9 % (ref 0–8)
ERYTHROCYTE [DISTWIDTH] IN BLOOD BY AUTOMATED COUNT: 14.3 % (ref 11.5–14.5)
EST. GFR  (NO RACE VARIABLE): 33.2 ML/MIN/1.73 M^2
EST. GFR  (NO RACE VARIABLE): 33.2 ML/MIN/1.73 M^2
GLUCOSE SERPL-MCNC: 119 MG/DL (ref 70–110)
GLUCOSE SERPL-MCNC: 74 MG/DL (ref 70–110)
HCO3 UR-SCNC: 26.6 MMOL/L (ref 24–28)
HCO3 UR-SCNC: 27.3 MMOL/L (ref 24–28)
HCT VFR BLD AUTO: 26.7 % (ref 40–54)
HGB BLD-MCNC: 8.6 G/DL (ref 14–18)
IMM GRANULOCYTES # BLD AUTO: 0.02 K/UL (ref 0–0.04)
IMM GRANULOCYTES NFR BLD AUTO: 0.3 % (ref 0–0.5)
LYMPHOCYTES # BLD AUTO: 1 K/UL (ref 1–4.8)
LYMPHOCYTES NFR BLD: 14.5 % (ref 18–48)
MAGNESIUM SERPL-MCNC: 2.6 MG/DL (ref 1.6–2.6)
MCH RBC QN AUTO: 27.9 PG (ref 27–31)
MCHC RBC AUTO-ENTMCNC: 32.2 G/DL (ref 32–36)
MCV RBC AUTO: 87 FL (ref 82–98)
MONOCYTES # BLD AUTO: 0.6 K/UL (ref 0.3–1)
MONOCYTES NFR BLD: 8.8 % (ref 4–15)
NEUTROPHILS # BLD AUTO: 4.9 K/UL (ref 1.8–7.7)
NEUTROPHILS NFR BLD: 72.8 % (ref 38–73)
NRBC BLD-RTO: 0 /100 WBC
PCO2 BLDA: 39.7 MMHG (ref 35–45)
PCO2 BLDA: 40.6 MMHG (ref 35–45)
PH SMN: 7.42 [PH] (ref 7.35–7.45)
PH SMN: 7.45 [PH] (ref 7.35–7.45)
PHOSPHATE SERPL-MCNC: 4.1 MG/DL (ref 2.7–4.5)
PLATELET # BLD AUTO: 284 K/UL (ref 150–450)
PMV BLD AUTO: 9.8 FL (ref 9.2–12.9)
PO2 BLDA: 28 MMHG (ref 40–60)
PO2 BLDA: 30 MMHG (ref 40–60)
POC BE: 2 MMOL/L
POC BE: 3 MMOL/L
POC SATURATED O2: 54 % (ref 95–100)
POC SATURATED O2: 61 % (ref 95–100)
POC TCO2: 28 MMOL/L (ref 24–29)
POC TCO2: 28 MMOL/L (ref 24–29)
POCT GLUCOSE: 152 MG/DL (ref 70–110)
POCT GLUCOSE: 167 MG/DL (ref 70–110)
POCT GLUCOSE: 168 MG/DL (ref 70–110)
POCT GLUCOSE: 178 MG/DL (ref 70–110)
POTASSIUM SERPL-SCNC: 3.9 MMOL/L (ref 3.5–5.1)
POTASSIUM SERPL-SCNC: 4.2 MMOL/L (ref 3.5–5.1)
POTASSIUM SERPL-SCNC: 4.6 MMOL/L (ref 3.5–5.1)
PROT SERPL-MCNC: 7.1 G/DL (ref 6–8.4)
RBC # BLD AUTO: 3.08 M/UL (ref 4.6–6.2)
SAMPLE: ABNORMAL
SAMPLE: ABNORMAL
SITE: ABNORMAL
SITE: ABNORMAL
SODIUM SERPL-SCNC: 135 MMOL/L (ref 136–145)
SODIUM SERPL-SCNC: 136 MMOL/L (ref 136–145)
WBC # BLD AUTO: 6.78 K/UL (ref 3.9–12.7)

## 2023-11-28 PROCEDURE — 25000003 PHARM REV CODE 250: Mod: NTX | Performed by: INTERNAL MEDICINE

## 2023-11-28 PROCEDURE — 20000000 HC ICU ROOM: Mod: NTX

## 2023-11-28 PROCEDURE — 25000003 PHARM REV CODE 250: Mod: NTX | Performed by: STUDENT IN AN ORGANIZED HEALTH CARE EDUCATION/TRAINING PROGRAM

## 2023-11-28 PROCEDURE — 63600175 PHARM REV CODE 636 W HCPCS: Mod: NTX | Performed by: STUDENT IN AN ORGANIZED HEALTH CARE EDUCATION/TRAINING PROGRAM

## 2023-11-28 PROCEDURE — 99292 CRITICAL CARE ADDL 30 MIN: CPT | Mod: NTX,,, | Performed by: INTERNAL MEDICINE

## 2023-11-28 PROCEDURE — 63600175 PHARM REV CODE 636 W HCPCS: Mod: NTX | Performed by: REGISTERED NURSE

## 2023-11-28 PROCEDURE — 82803 BLOOD GASES ANY COMBINATION: CPT | Mod: NTX

## 2023-11-28 PROCEDURE — 87040 BLOOD CULTURE FOR BACTERIA: CPT | Mod: NTX | Performed by: REGISTERED NURSE

## 2023-11-28 PROCEDURE — 27000202 HC IABP, ADD'L DAY: Mod: NTX

## 2023-11-28 PROCEDURE — 85025 COMPLETE CBC W/AUTO DIFF WBC: CPT | Mod: NTX | Performed by: STUDENT IN AN ORGANIZED HEALTH CARE EDUCATION/TRAINING PROGRAM

## 2023-11-28 PROCEDURE — 85730 THROMBOPLASTIN TIME PARTIAL: CPT | Mod: NTX | Performed by: STUDENT IN AN ORGANIZED HEALTH CARE EDUCATION/TRAINING PROGRAM

## 2023-11-28 PROCEDURE — 99900035 HC TECH TIME PER 15 MIN (STAT): Mod: NTX

## 2023-11-28 PROCEDURE — 84132 ASSAY OF SERUM POTASSIUM: CPT | Mod: NTX | Performed by: INTERNAL MEDICINE

## 2023-11-28 PROCEDURE — 83735 ASSAY OF MAGNESIUM: CPT | Mod: NTX | Performed by: STUDENT IN AN ORGANIZED HEALTH CARE EDUCATION/TRAINING PROGRAM

## 2023-11-28 PROCEDURE — 25000003 PHARM REV CODE 250: Mod: NTX | Performed by: PHYSICIAN ASSISTANT

## 2023-11-28 PROCEDURE — 80048 BASIC METABOLIC PNL TOTAL CA: CPT | Mod: NTX,XB | Performed by: REGISTERED NURSE

## 2023-11-28 PROCEDURE — 84100 ASSAY OF PHOSPHORUS: CPT | Mod: NTX | Performed by: STUDENT IN AN ORGANIZED HEALTH CARE EDUCATION/TRAINING PROGRAM

## 2023-11-28 PROCEDURE — 99291 CRITICAL CARE FIRST HOUR: CPT | Mod: FS,NTX,, | Performed by: INTERNAL MEDICINE

## 2023-11-28 PROCEDURE — 25000003 PHARM REV CODE 250: Mod: NTX | Performed by: REGISTERED NURSE

## 2023-11-28 PROCEDURE — 80053 COMPREHEN METABOLIC PANEL: CPT | Mod: NTX | Performed by: STUDENT IN AN ORGANIZED HEALTH CARE EDUCATION/TRAINING PROGRAM

## 2023-11-28 PROCEDURE — 94761 N-INVAS EAR/PLS OXIMETRY MLT: CPT | Mod: NTX,XB

## 2023-11-28 RX ORDER — BISACODYL 10 MG/1
10 SUPPOSITORY RECTAL DAILY PRN
Status: DISCONTINUED | OUTPATIENT
Start: 2023-11-29 | End: 2024-01-23

## 2023-11-28 RX ORDER — POTASSIUM CHLORIDE 20 MEQ/1
20 TABLET, EXTENDED RELEASE ORAL ONCE
Status: COMPLETED | OUTPATIENT
Start: 2023-11-28 | End: 2023-11-28

## 2023-11-28 RX ORDER — FUROSEMIDE 10 MG/ML
80 INJECTION INTRAMUSCULAR; INTRAVENOUS
Status: DISCONTINUED | OUTPATIENT
Start: 2023-11-28 | End: 2023-11-29

## 2023-11-28 RX ADMIN — SENNOSIDES 8.6 MG: 8.6 TABLET, FILM COATED ORAL at 08:11

## 2023-11-28 RX ADMIN — POTASSIUM CHLORIDE 20 MEQ: 1500 TABLET, EXTENDED RELEASE ORAL at 07:11

## 2023-11-28 RX ADMIN — INSULIN ASPART 2 UNITS: 100 INJECTION, SOLUTION INTRAVENOUS; SUBCUTANEOUS at 05:11

## 2023-11-28 RX ADMIN — AMIODARONE HYDROCHLORIDE 400 MG: 200 TABLET ORAL at 08:11

## 2023-11-28 RX ADMIN — OXYCODONE HYDROCHLORIDE 5 MG: 5 TABLET ORAL at 07:11

## 2023-11-28 RX ADMIN — PANTOPRAZOLE SODIUM 40 MG: 40 TABLET, DELAYED RELEASE ORAL at 05:11

## 2023-11-28 RX ADMIN — INSULIN ASPART 12 UNITS: 100 INJECTION, SOLUTION INTRAVENOUS; SUBCUTANEOUS at 05:11

## 2023-11-28 RX ADMIN — HYDRALAZINE HYDROCHLORIDE 25 MG: 25 TABLET, FILM COATED ORAL at 05:11

## 2023-11-28 RX ADMIN — THERA TABS 1 TABLET: TAB at 08:11

## 2023-11-28 RX ADMIN — DOBUTAMINE IN DEXTROSE 5 MCG/KG/MIN: 400 INJECTION, SOLUTION INTRAVENOUS at 04:11

## 2023-11-28 RX ADMIN — HEPARIN SODIUM AND DEXTROSE 16 UNITS/KG/HR: 10000; 5 INJECTION INTRAVENOUS at 09:11

## 2023-11-28 RX ADMIN — ASPIRIN 81 MG: 81 TABLET, COATED ORAL at 08:11

## 2023-11-28 RX ADMIN — FUROSEMIDE 80 MG: 10 INJECTION, SOLUTION INTRAVENOUS at 11:11

## 2023-11-28 RX ADMIN — INSULIN ASPART 2 UNITS: 100 INJECTION, SOLUTION INTRAVENOUS; SUBCUTANEOUS at 12:11

## 2023-11-28 RX ADMIN — Medication 400 MG: at 08:11

## 2023-11-28 RX ADMIN — ATORVASTATIN CALCIUM 40 MG: 40 TABLET, FILM COATED ORAL at 08:11

## 2023-11-28 RX ADMIN — ACETAMINOPHEN 650 MG: 325 TABLET ORAL at 07:11

## 2023-11-28 RX ADMIN — POLYETHYLENE GLYCOL 3350 17 G: 17 POWDER, FOR SOLUTION ORAL at 08:11

## 2023-11-28 RX ADMIN — INSULIN ASPART 12 UNITS: 100 INJECTION, SOLUTION INTRAVENOUS; SUBCUTANEOUS at 12:11

## 2023-11-28 RX ADMIN — INSULIN ASPART 12 UNITS: 100 INJECTION, SOLUTION INTRAVENOUS; SUBCUTANEOUS at 08:11

## 2023-11-28 RX ADMIN — OXYCODONE HYDROCHLORIDE 5 MG: 5 TABLET ORAL at 05:11

## 2023-11-28 RX ADMIN — INSULIN ASPART 2 UNITS: 100 INJECTION, SOLUTION INTRAVENOUS; SUBCUTANEOUS at 08:11

## 2023-11-28 NOTE — PLAN OF CARE
Cardiac ICU Care Plan    POC reviewed with Radha Abbott and family. Questions and concerns addressed. No acute events today. Pt progressing toward goals. Will continue to monitor. Lasix from continuous to BID. See below and flowsheets for full assessment and VS info.       Neuro:  [place] Coma Scale  Best Eye Response: 4-->(E4) spontaneous  Best Motor Response: 6-->(M6) obeys commands  Best Verbal Response: 5-->(V5) oriented  Michelle Coma Scale Score: 15  Assessment Qualifiers: patient not sedated/intubated  Pupil PERRLA: no    24 hr Temp:  [97 °F (36.1 °C)-99.3 °F (37.4 °C)]      CV:  Rhythm: normal sinus rhythm   DVT prophylaxis: VTE Required Core Measure: Pharmacological prophylaxis initiated/maintained    CVP (mean): 1 mmHg (11/28/23 1500)    Pulmonary Artery Catheter Assessment  11/20/23 1330 Internal Jugular Left-Current Insertion Depth (cm): 63.5 cm (11/28/23 0700)  PAP: 72/35 (11/28/23 1600)  SVO2 (%): (S) 59 % (11/27/23 2211)    IABP Mode: Semi-auto  IABP Rate: 1:1  IABP Trigger: ECG  IABP Augmented Diastolic Pressure: 103          Pulses  Right Radial Pulse: 2+ (normal)  Left Radial Pulse: 2+ (normal)  Right Dorsalis Pedis Pulse: 1+ (weak)  Left Dorsalis Pedis Pulse: 1+ (weak)  Right Posterior Tibial Pulse: Doppler  Left Posterior Tibial Pulse: Doppler    Resp:  Flow (L/min): 4  Oxygen Concentration (%): 36    GI/:  GI prophylaxis: yes  Diet/Nutrition Received: consistent carb/diabetic diet  Last Bowel Movement: 11/27/23  Voiding Characteristics: external catheter   Intake/Output Summary (Last 24 hours) at 11/28/2023 1652  Last data filed at 11/28/2023 1600  Gross per 24 hour   Intake 1407.16 ml   Output 2200 ml   Net -792.84 ml        Nutritional Supplement Intake: Quantity , Type:     Labs/Accuchecks:  Recent Labs   Lab 11/26/23  0151 11/27/23  0211 11/27/23  0837 11/28/23  0201   WBC 9.28 7.39  --  6.78   RBC 3.11* 3.03*  --  3.08*   HGB 8.8* 8.5*  --  8.6*   HCT 27.2* 26.4* 28* 26.7*    306  --  " 284      Recent Labs   Lab 11/26/23  0151 11/27/23  0211 11/28/23  0201   APTT 47.8* 53.0* 49.3*      Recent Labs     11/28/23  0201 11/28/23  1456   * 136   K 4.2 3.9   CO2 24 23   CL 98 98   BUN 74* 69*   CREATININE 2.2* 2.2*   ALKPHOS 79  --    ALT 21  --    AST 25  --    BILITOT 0.4  --      No results for input(s): "CPK", "CPKMB", "MB", "TROPONINI" in the last 168 hours.   Recent Labs     11/27/23  0837 11/27/23  1945 11/27/23 2211 11/28/23  0823   PH 7.412  --   --  7.445   PCO2 39.5  --   --  39.7   PO2 34* 27* 30* 30*   HCO3 25.1  --   --  27.3   POCSATURATED 66 53 59 61   BE 1  --   --  3*       Electrolytes: Electrolytes replaced  Accuchecks: ACHS    Gtts/LDAs:   sodium chloride 0.9%      sodium chloride 0.9% 5 mL/hr at 11/28/23 1600    DOBUTamine 5 mcg/kg/min (11/28/23 1600)    heparin (porcine) in D5W 16 Units/kg/hr (11/28/23 1600)    sodium chloride 0.9% 250 mL/hr at 11/20/23 2002       Lines/Drains/Airways       Central Venous Catheter Line  Duration              Introducer Single Lumen 11/20/23 1330 Internal Jugular Left 8 days    Pulmonary Artery Catheter Assessment  11/20/23 1330 Internal Jugular Left 8 days              Drain  Duration             Male External Urinary Catheter 11/26/23 0628 2 days              Line  Duration                  IABP 11/21/23 1048 7.5 Fr. 40 mL 7 days              Peripheral Intravenous Line  Duration                  Midline Catheter Insertion/Assessment  - Single Lumen 11/14/23 1113 Left brachial vein 20g x 10cm 14 days                    Skin/Wounds  Bathing/Skin Care: (S) bath, complete;dressed/undressed (11/28/23 0645)  Wounds: No  Wound care consulted: No    "

## 2023-11-28 NOTE — ASSESSMENT & PLAN NOTE
IVÁN on CKD2. Baseline Cr of 1.7-2.0.   -Currently 2.2 and down trending   - Hold ARNi, entresto  -Trend BMPs daily

## 2023-11-28 NOTE — ASSESSMENT & PLAN NOTE
Pt with known ICM with an EF of 25% on home  presenting with decompensated HF.  Not in cardiogenic shock    RHC 11/14: RA 14, PA 70/35, PCWP 32, CO 4.1, CI 2.1.   Repeat RHC 11/20 RA RA  20  PA 60/29 (39)  PCWP 29    CO  4.6 l/min  CI  2.3 l/min/m2  2D echo 11/21 showed EF 15-20%, mild RV dysfunction, mild MR, LVEDD 6.9     Repeat on 11/24 showed EF 10-15%, RV mildly reduced, mild-mod MR, LVEDD 6.42  - Home  gtt at 5  - home GDMT: entresto 24/26mg BID (on hold 2/2 IVÁN), Hydralazine 25 q8h being continued  - home diuretics: Was taking lasix 40 bid  - s/p IABP placement 11/21. Augmenting 100s at 1:1  - Continue VAD/OHTx workup. (Awaiting OR date)   -Blood type A+  -PT/OT and nutrition   -Continue current support with  5,  IABP 1:1  -stopping Lasix gtt and transition to Lasix 80 IV BID   -Monitor strict I/Os  -trend CVP, sv02, and PA pressures  -Plans for LVAD placement once renal function is optimal

## 2023-11-28 NOTE — CARE UPDATE
-Glucose Goal 140-180    -A1C:   Hemoglobin A1C   Date Value Ref Range Status   10/12/2023 7.6 (H) 4.0 - 5.6 % Final     Comment:     ADA Screening Guidelines:  5.7-6.4%  Consistent with prediabetes  >or=6.5%  Consistent with diabetes    High levels of fetal hemoglobin interfere with the HbA1C  assay. Heterozygous hemoglobin variants (HbS, HgC, etc)do  not significantly interfere with this assay.   However, presence of multiple variants may affect accuracy.           -HOME REGIMEN:   Metformin (off since October)     -GLUCOSE TREND FOR THE PAST 24HRS:   Recent Labs   Lab 11/26/23  1936 11/27/23  0715 11/27/23  1334 11/27/23  1832 11/27/23  1945 11/28/23  0821   POCTGLUCOSE 141* 121* 191* 140* 140* 178*           -NO HYPOGYCEMIAS NOTED     - Diet  Diet diabetic 2800 Calorie; Fluid - 2000mL; Standard Tray    -TOLERATING 75 % OF PO DIET     -NPO? No    -Steroids - None      -Tube Feeds - None         Plan:   Levemir (Insulin Detemir)  20 units nightly  - Continue Novolog (Insulin Aspart) 12 units TIDWM and prn for BG excursions [facility] SSI (150/25)  - BG checks AC/HS  - Hypoglycemia protocol in place     ** Please notify Endocrine for any change and/or advance in diet**  ** Please call Endocrine for any BG related issues **     Discharge Planning:   TBD. Please notify endocrinology prior to discharge.

## 2023-11-28 NOTE — PROGRESS NOTES
Update:  SW met with pt's spouse.  Pt was sleeping at time of visit.  Pt's wife reported that pt is supposed to have LVAD placement later this week. Pt's wife reported having a good understanding of POC and expressed no needs at this time.  Pt's wife seems to be coping well.  SW will follow.

## 2023-11-28 NOTE — PROGRESS NOTES
11/28/2023  Deni Frye    Current provider:  Brayan Ocampo MD    Device interrogation:       No data to display                   Rounded on [facility] to ensure all mechanical assist device settings (IABP or VAD) were appropriate and all parameters were within limits.  I was able to ensure all back up equipment was present, the staff had no issues, and the Perfusion Department daily rounding was complete.      For implantable VADs: Interrogation of Ventricular assist device was performed with analysis of device parameters and review of device function. I have personally reviewed the interrogation findings and agree with findings as stated.     In emergency, the nursing units have been notified to contact the perfusion department either by:  Calling [id number] from 630am to 4pm Mon thru Fri, utilizing the On-Call Finder functionality of Epic and searching for Perfusion, or by contacting the hospital  from 4pm to 630am and on weekends and asking to speak with the perfusionist on call.    9:04 AM

## 2023-11-28 NOTE — PROGRESS NOTES
[facility] - Cardiac Intensive Care  Heart Transplant  Progress Note    Patient Name: Radha Abbott  MRN: [id number]  Admission Date: 11/9/2023  Hospital Length of Stay: 19 days  Attending Physician: Brayan Ocampo MD  Primary Care Provider: Vasu Kong MD  Principal Problem:Acute on chronic combined systolic and diastolic CHF, NYHA class 4    Subjective:   Interval History: No acute events overnight. Reports having BM yesterday. Renal function  trending down again today. Net negative 765cc/24hr. Stopping Lasix gtt and transition to Lasix pushes BID. Support with IABP @ 1:1 and  5. Awaiting LVAD date.    Hemodynamics:   CVP 1  PA 44/12  CO 6.2  CI 3.1  SVR 1058     Continuous Infusions:   sodium chloride 0.9%      sodium chloride 0.9% 5 mL/hr at 11/28/23 0908    DOBUTamine 5 mcg/kg/min (11/28/23 0908)    heparin (porcine) in D5W 16 Units/kg/hr (11/28/23 0924)    sodium chloride 0.9% 250 mL/hr at 11/20/23 2002     Scheduled Meds:   amiodarone  400 mg Oral Daily    aspirin  81 mg Oral Daily    atorvastatin  40 mg Oral Daily    bisacodyL  10 mg Rectal Daily    furosemide (LASIX) injection  80 mg Intravenous Q12H    hydrALAZINE  25 mg Oral Q8H    insulin aspart U-100  12 Units Subcutaneous TIDWM    insulin detemir U-100  20 Units Subcutaneous QHS    lactulose  15 g Oral Once    LIDOcaine  2 patch Transdermal Q24H    magnesium oxide  400 mg Oral Daily    multivitamin  1 tablet Oral Daily    pantoprazole  40 mg Oral Before breakfast    polyethylene glycol  17 g Oral Daily    senna  8.6 mg Oral Daily     PRN Meds:0.9%  NaCl infusion (for blood administration), sodium chloride 0.9%, acetaminophen, baclofen, dextrose 10%, dextrose 10%, gabapentin, glucagon (human recombinant), glucose, glucose, heparin (PORCINE), heparin (PORCINE), insulin aspart U-100, ondansetron, oxyCODONE, senna-docusate 8.6-50 mg, sodium chloride 0.9%, sodium chloride 0.9%    Review of patient's allergies indicates:  No Known  Allergies  Objective:     Vital Signs (Most Recent):  Temp: 97.2 °F (36.2 °C) (11/28/23 0700)  Pulse: 85 (11/28/23 0709)  Resp: 17 (11/28/23 0700)  BP: 103/66 (11/28/23 0700)  SpO2: 98 % (11/28/23 0823) Vital Signs (24h Range):  Temp:  [97.2 °F (36.2 °C)-99.3 °F (37.4 °C)] 97.2 °F (36.2 °C)  Pulse:  [81-93] 85  Resp:  [12-28] 17  SpO2:  [93 %-100 %] 98 %  BP: ()/(54-74) 103/66     Patient Vitals for the past 72 hrs (Last 3 readings):   Weight   11/28/23 0522 78.5 kg (173 lb 1 oz)   11/27/23 0400 76.5 kg (168 lb 10.4 oz)   11/26/23 0530 77 kg (169 lb 12.1 oz)       Body mass index is 23.47 kg/m².      Intake/Output Summary (Last 24 hours) at 11/28/2023 0951  Last data filed at 11/28/2023 0908  Gross per 24 hour   Intake 1296.16 ml   Output 2300 ml   Net -1003.84 ml         Hemodynamic Parameters:  PAP: (52-68)/(21-29) 65/28  PAP (Mean):  [34 mmHg-46 mmHg] 43 mmHg    Telemetry: NSR        Physical Exam  Constitutional:       Appearance: Normal appearance. He is ill-appearing.   HENT:      Head: Normocephalic.   Cardiovascular:      Rate and Rhythm: Normal rate and regular rhythm.      Pulses: Normal pulses.      Comments: IABP   Pulmonary:      Effort: Pulmonary effort is normal.      Breath sounds: Normal breath sounds.   Abdominal:      General: Bowel sounds are normal.      Palpations: Abdomen is soft.   Musculoskeletal:         General: Normal range of motion.      Cervical back: Neck supple.   Skin:     General: Skin is warm and dry.   Neurological:      General: No focal deficit present.      Mental Status: He is alert and oriented to person, place, and time.            Significant Labs:  CBC:  Recent Labs   Lab 11/26/23  0151 11/27/23  0211 11/27/23  0837 11/28/23  0201   WBC 9.28 7.39  --  6.78   RBC 3.11* 3.03*  --  3.08*   HGB 8.8* 8.5*  --  8.6*   HCT 27.2* 26.4* 28* 26.7*    306  --  284   MCV 88 87  --  87   MCH 28.3 28.1  --  27.9   MCHC 32.4 32.2  --  32.2       BNP:  No results for  "input(s): "BNP" in the last 168 hours.    Invalid input(s): "BNPTRIAGELBLO"  CMP:  Recent Labs   Lab 11/26/23  0151 11/26/23  1618 11/27/23 0211 11/28/23  0201   * 147* 125* 119*   CALCIUM 9.5 9.7 9.3 9.1   ALBUMIN 2.9*  --  2.8* 2.9*   PROT 7.2  --  7.1 7.1   * 135* 135* 135*   K 4.2 4.3 4.3 4.2   CO2 25 23 22* 24   CL 97 96 98 98   BUN 86* 75* 78* 74*   CREATININE 2.4* 2.3* 2.3* 2.2*   ALKPHOS 85  --  78 79   ALT 21  --  21 21   AST 22  --  25 25   BILITOT 0.6  --  0.5 0.4        Coagulation:   Recent Labs   Lab 11/26/23  0151 11/27/23 0211 11/28/23  0201   APTT 47.8* 53.0* 49.3*       LDH:  No results for input(s): "LDH" in the last 72 hours.  Microbiology:  Microbiology Results (last 7 days)       ** No results found for the last 168 hours. **            BMP:   Recent Labs   Lab 11/28/23  0201   *   *   K 4.2   CL 98   CO2 24   BUN 74*   CREATININE 2.2*   CALCIUM 9.1   MG 2.6       I have reviewed all pertinent labs within the past 24 hours.    Estimated Creatinine Clearance: 38.7 mL/min (A) (based on SCr of 2.2 mg/dL (H)).    Diagnostic Results:  I have reviewed all pertinent imaging results/findings within the past 24 hours.  Assessment and Plan:     63 year old male with hx of CAD s/p 3v CABG (unclear anatomy, 2009), ICM with a recent EF of 15-20% s/p ICD (medtronik 2009), DM2 (a1c 7.7), HTN, HLD, Vfib on amio who presents to the ED with CC of SOB     [place] [facility] [place].  He was started on a Lasix gtt and did well.  Started on gDMT and discharged home on  5 with plans to follow up in HTS clinic for ongoing transplant evaluation at another facility.  He says that about a few days ago he started to notice LE swelling.  This turned into Nelson and orthopnea.  He says he can't walk to the bathroom without getting SOB.  He also complains of weight gain.  He takes torsemide 20mg BID at home and was told to trial additional Lasix which he did without any " improvement.  He was rx metolazone but has not been filled.  He came to the ED     In the ED he was AF with stable VS on RA.  CBC showing chronic anemia.  CMP notable for acute on chronic CKD with baseline around 2.1 and Cr now 2.6.  BNP elevated.  Lactate neg.  CXR showing pulmonary edema.  I evaluated the pt at the bedside.  Bedside TTE showing CVP >15.  He was subsequently admitted to the CCU for diuresis.     He was continued on his home  and was started on a lasix gtt, which he responded well to overnight (net -1700cc. He feels much better this morning as well. Our transplant coordinators have been working on insurance approval and he is now being transferred to [facility] service for transplant evaluation.     * Acute on chronic combined systolic and diastolic CHF, NYHA class 4  Pt with known ICM with an EF of 25% on home  presenting with decompensated HF.  Not in cardiogenic shock    RHC 11/14: RA 14, PA 70/35, PCWP 32, CO 4.1, CI 2.1.   Repeat RHC 11/20 RA RA  20  PA 60/29 (39)  PCWP 29    CO  4.6 l/min  CI  2.3 l/min/m2  2D echo 11/21 showed EF 15-20%, mild RV dysfunction, mild MR, LVEDD 6.9     Repeat on 11/24 showed EF 10-15%, RV mildly reduced, mild-mod MR, LVEDD 6.42  - Home  gtt at 5  - home GDMT: entresto 24/26mg BID (on hold 2/2 IVÁN), Hydralazine 25 q8h being continued  - home diuretics: Was taking lasix 40 bid  - s/p IABP placement 11/21. Augmenting 100s at 1:1  - Continue VAD/OHTx workup. (Awaiting OR date)   -Blood type A+  -PT/OT and nutrition   -Continue current support with  5,  IABP 1:1  -stopping Lasix gtt and transition to Lasix 80 IV BID   -Monitor strict I/Os  -trend CVP, sv02, and PA pressures  -Plans for LVAD placement once renal function is optimal       PAF (paroxysmal atrial fibrillation)  Known hx of pAF. In sinus rhythm on admission, but 1 run of AF RVR overnight. He spontaneously converted.  - Continue home amiodarone 400mg qd  - Stop home Eliquis and continue heparin gtt  while in the hospital.       IVÁN (acute kidney injury)  IVÁN on CKD2. Baseline Cr of 1.7-2.0.   -Currently 2.2 and down trending   - Hold ARNi, entresto  -Trend BMPs daily     Type 2 diabetes mellitus without complication, without long-term current use of insulin  -A1c 7.6, not insulin dependent  - MDSSI  -diabetic diet ordered/Boost glucose control   - Endocrine following, appreciate assistance with blood glucose management    CAD (coronary artery disease)  -S/p 3vCABG in 2009  - continue home ASA, HI statin    Ventricular tachycardia  Hx VT s/p ICD placement (medtronic 2009)  - Continue home amio 400mg qd    .Uninterrupted Critical Care/Counseling Time (not including procedures): 60 mins       Hugh Duran NP  Heart Transplant  [facility] - Cardiac Intensive Care

## 2023-11-28 NOTE — PLAN OF CARE
Cardiac ICU Care Plan    POC reviewed with Radha Abbott and family. Questions and concerns addressed. No acute events today. Pt progressing toward goals. Will continue to monitor. See below and flowsheets for full assessment and VS info.     Neuro:  Michelle Coma Scale  Best Eye Response: 4-->(E4) spontaneous  Best Motor Response: 6-->(M6) obeys commands  Best Verbal Response: 5-->(V5) oriented  Michelle Coma Scale Score: 15  Assessment Qualifiers: patient not sedated/intubated  Pupil PERRLA: yes    24 hr Temp:  [97.7 °F (36.5 °C)-99.3 °F (37.4 °C)]      CV:  Rhythm: normal sinus rhythm   DVT prophylaxis: VTE Required Core Measure: Pharmacological prophylaxis initiated/maintained    CVP (mean): (S) 3 mmHg (11/28/23 0432)    Pulmonary Artery Catheter Assessment  11/20/23 1330 Internal Jugular Left-Current Insertion Depth (cm): 63.5 cm (11/27/23 1901)  PAP: 65/28 (11/27/23 1800)  SVO2 (%): (S) 59 % (11/27/23 2211)    IABP Mode: Semi-auto  IABP Rate: 1:1  IABP Trigger: ECG  IABP Augmented Diastolic Pressure: 111          Pulses  Right Radial Pulse: 2+ (normal)  Left Radial Pulse: 2+ (normal)  Right Dorsalis Pedis Pulse: 1+ (weak)  Left Dorsalis Pedis Pulse: 1+ (weak)  Right Posterior Tibial Pulse: Doppler  Left Posterior Tibial Pulse: Doppler    Resp:  Flow (L/min): 4  Oxygen Concentration (%): 36    GI/:  GI prophylaxis: yes  Diet/Nutrition Received: consistent carb/diabetic diet, restrict fluids  Last Bowel Movement: 11/27/23  Voiding Characteristics: external catheter   Intake/Output Summary (Last 24 hours) at 11/28/2023 0702  Last data filed at 11/28/2023 0645  Gross per 24 hour   Intake 1510.12 ml   Output 2300 ml   Net -789.88 ml        Nutritional Supplement Intake: Quantity Refused, Type:  None    Labs/Accuchecks:  Recent Labs   Lab 11/26/23  0151 11/27/23  0211 11/27/23  0837 11/28/23  0201   WBC 9.28 7.39  --  6.78   RBC 3.11* 3.03*  --  3.08*   HGB 8.8* 8.5*  --  8.6*   HCT 27.2* 26.4* 28* 26.7*    306  " --  284      Recent Labs   Lab 11/26/23  0151 11/27/23  0211 11/28/23  0201   APTT 47.8* 53.0* 49.3*      Recent Labs     11/28/23  0201   *   K 4.2   CO2 24   CL 98   BUN 74*   CREATININE 2.2*   ALKPHOS 79   ALT 21   AST 25   BILITOT 0.4     No results for input(s): "CPK", "CPKMB", "MB", "TROPONINI" in the last 168 hours.   Recent Labs     11/27/23  0837 11/27/23  1945 11/27/23 2211   PH 7.412  --   --    PCO2 39.5  --   --    PO2 34* 27* 30*   HCO3 25.1  --   --    POCSATURATED 66 53 59   BE 1  --   --        Electrolytes: N/A - electrolytes WDL  Accuchecks: ACHS    Gtts/LDAs:   sodium chloride 0.9%      sodium chloride 0.9% 5 mL/hr at 11/28/23 0601    DOBUTamine 5 mcg/kg/min (11/28/23 0601)    furosemide (LASIX) 500 mg in 50 mL infusion (conc: 10 mg/mL) 10 mg/hr (11/28/23 0601)    heparin (porcine) in D5W 16 Units/kg/hr (11/28/23 0601)    sodium chloride 0.9% 250 mL/hr at 11/20/23 2002       Lines/Drains/Airways       Central Venous Catheter Line  Duration              Introducer Single Lumen 11/20/23 1330 Internal Jugular Left 7 days    Pulmonary Artery Catheter Assessment  11/20/23 1330 Internal Jugular Left 7 days              Drain  Duration             Male External Urinary Catheter 11/26/23 0628 2 days              Line  Duration                  IABP 11/21/23 1048 7.5 Fr. 40 mL 6 days              Peripheral Intravenous Line  Duration                  Midline Catheter Insertion/Assessment  - Single Lumen 11/14/23 1113 Left brachial vein 20g x 10cm 13 days                    Skin/Wounds  Bathing/Skin Care: (S) bath, complete;dressed/undressed (11/28/23 0645)  Wounds: No  Wound care consulted: No      Problem: Adult Inpatient Plan of Care  Goal: Plan of Care Review  Outcome: Ongoing, Progressing  Goal: Patient-Specific Goal (Individualized)  Outcome: Ongoing, Progressing  Goal: Absence of Hospital-Acquired Illness or Injury  Outcome: Ongoing, Progressing  Goal: Optimal Comfort and Wellbeing  Outcome: " Ongoing, Progressing  Goal: Readiness for Transition of Care  Outcome: Ongoing, Progressing     Problem: Adjustment to Illness (Heart Failure)  Goal: Optimal Coping  Outcome: Ongoing, Progressing     Problem: Cardiac Output Decreased (Heart Failure)  Goal: Optimal Cardiac Output  Outcome: Ongoing, Progressing

## 2023-11-28 NOTE — SUBJECTIVE & OBJECTIVE
Interval History: No acute events overnight. Reports having BM yesterday. Renal function  trending down again today. Net negative 765cc/24hr. Stopping Lasix gtt and transition to Lasix pushes BID. Support with IABP @ 1:1 and  5. Awaiting LVAD date.    Hemodynamics:   CVP 1  PA 44/12  CO 6.2  CI 3.1  SVR 1058     Continuous Infusions:   sodium chloride 0.9%      sodium chloride 0.9% 5 mL/hr at 11/28/23 0908    DOBUTamine 5 mcg/kg/min (11/28/23 0908)    heparin (porcine) in D5W 16 Units/kg/hr (11/28/23 0924)    sodium chloride 0.9% 250 mL/hr at 11/20/23 2002     Scheduled Meds:   amiodarone  400 mg Oral Daily    aspirin  81 mg Oral Daily    atorvastatin  40 mg Oral Daily    bisacodyL  10 mg Rectal Daily    furosemide (LASIX) injection  80 mg Intravenous Q12H    hydrALAZINE  25 mg Oral Q8H    insulin aspart U-100  12 Units Subcutaneous TIDWM    insulin detemir U-100  20 Units Subcutaneous QHS    lactulose  15 g Oral Once    LIDOcaine  2 patch Transdermal Q24H    magnesium oxide  400 mg Oral Daily    multivitamin  1 tablet Oral Daily    pantoprazole  40 mg Oral Before breakfast    polyethylene glycol  17 g Oral Daily    senna  8.6 mg Oral Daily     PRN Meds:0.9%  NaCl infusion (for blood administration), sodium chloride 0.9%, acetaminophen, baclofen, dextrose 10%, dextrose 10%, gabapentin, glucagon (human recombinant), glucose, glucose, heparin (PORCINE), heparin (PORCINE), insulin aspart U-100, ondansetron, oxyCODONE, senna-docusate 8.6-50 mg, sodium chloride 0.9%, sodium chloride 0.9%    Review of patient's allergies indicates:  No Known Allergies  Objective:     Vital Signs (Most Recent):  Temp: 97.2 °F (36.2 °C) (11/28/23 0700)  Pulse: 85 (11/28/23 0709)  Resp: 17 (11/28/23 0700)  BP: 103/66 (11/28/23 0700)  SpO2: 98 % (11/28/23 0823) Vital Signs (24h Range):  Temp:  [97.2 °F (36.2 °C)-99.3 °F (37.4 °C)] 97.2 °F (36.2 °C)  Pulse:  [81-93] 85  Resp:  [12-28] 17  SpO2:  [93 %-100 %] 98 %  BP: ()/(54-74)  "103/66     Patient Vitals for the past 72 hrs (Last 3 readings):   Weight   11/28/23 0522 78.5 kg (173 lb 1 oz)   11/27/23 0400 76.5 kg (168 lb 10.4 oz)   11/26/23 0530 77 kg (169 lb 12.1 oz)       Body mass index is 23.47 kg/m².      Intake/Output Summary (Last 24 hours) at 11/28/2023 0951  Last data filed at 11/28/2023 0908  Gross per 24 hour   Intake 1296.16 ml   Output 2300 ml   Net -1003.84 ml         Hemodynamic Parameters:  PAP: (52-68)/(21-29) 65/28  PAP (Mean):  [34 mmHg-46 mmHg] 43 mmHg    Telemetry: NSR        Physical Exam  Constitutional:       Appearance: Normal appearance. He is ill-appearing.   HENT:      Head: Normocephalic.   Cardiovascular:      Rate and Rhythm: Normal rate and regular rhythm.      Pulses: Normal pulses.      Comments: IABP   Pulmonary:      Effort: Pulmonary effort is normal.      Breath sounds: Normal breath sounds.   Abdominal:      General: Bowel sounds are normal.      Palpations: Abdomen is soft.   Musculoskeletal:         General: Normal range of motion.      Cervical back: Neck supple.   Skin:     General: Skin is warm and dry.   Neurological:      General: No focal deficit present.      Mental Status: He is alert and oriented to person, place, and time.            Significant Labs:  CBC:  Recent Labs   Lab 11/26/23 0151 11/27/23 0211 11/27/23  0837 11/28/23  0201   WBC 9.28 7.39  --  6.78   RBC 3.11* 3.03*  --  3.08*   HGB 8.8* 8.5*  --  8.6*   HCT 27.2* 26.4* 28* 26.7*    306  --  284   MCV 88 87  --  87   MCH 28.3 28.1  --  27.9   MCHC 32.4 32.2  --  32.2       BNP:  No results for input(s): "BNP" in the last 168 hours.    Invalid input(s): "BNPTRIAGELBLO"  CMP:  Recent Labs   Lab 11/26/23  0151 11/26/23  1618 11/27/23 0211 11/28/23  0201   * 147* 125* 119*   CALCIUM 9.5 9.7 9.3 9.1   ALBUMIN 2.9*  --  2.8* 2.9*   PROT 7.2  --  7.1 7.1   * 135* 135* 135*   K 4.2 4.3 4.3 4.2   CO2 25 23 22* 24   CL 97 96 98 98   BUN 86* 75* 78* 74*   CREATININE " "2.4* 2.3* 2.3* 2.2*   ALKPHOS 85  --  78 79   ALT 21  --  21 21   AST 22  --  25 25   BILITOT 0.6  --  0.5 0.4        Coagulation:   Recent Labs   Lab 11/26/23  0151 11/27/23  0211 11/28/23  0201   APTT 47.8* 53.0* 49.3*       LDH:  No results for input(s): "LDH" in the last 72 hours.  Microbiology:  Microbiology Results (last 7 days)       ** No results found for the last 168 hours. **            BMP:   Recent Labs   Lab 11/28/23  0201   *   *   K 4.2   CL 98   CO2 24   BUN 74*   CREATININE 2.2*   CALCIUM 9.1   MG 2.6       I have reviewed all pertinent labs within the past 24 hours.    Estimated Creatinine Clearance: 38.7 mL/min (A) (based on SCr of 2.2 mg/dL (H)).    Diagnostic Results:  I have reviewed all pertinent imaging results/findings within the past 24 hours.  "

## 2023-11-28 NOTE — TELEPHONE ENCOUNTER
Radha Abbott was discussed at selection committee . Patient presented for OHT and VAD. Declined for OHT due to:  comorbidities      Patient has an anticipated survival benefit. Patient has NYHA IV symptoms which have failed to respond to optimal medical management.     Decision made to proceed with DT VAD implantation. This has been discussed with the patient and family, including the reasons for why they are not a transplant candidate at this time.     Patient is NYHA FC IV heart failure and has an LVEF less than or equal 25%.     Patient has a cardiac index less than 2.2 off inotrope, and meets one of the following criteria: have advanced heart failure for at least 14 days and are dependent on an IABP or similar mechanical circulatory support for at least 7 days.    Patient is INTERMACS class 2, ACC stage D.     Discussed with the patient and family [facility] Mechanical Circulatory Support program outcomes as reported in INTERMACS (Interagency Registry for Mechanically Assisted Circulatory Support):    1 year survival = 92.7%  2 year survival = 86.7%  3 year survival = 86.7%    Patient and family acknowledged receipt of this information and all questions were answered.     Brayan Ocampo MD

## 2023-11-28 NOTE — PROGRESS NOTES
Interdisciplinary Rounds Report:   Attended interdisciplinary rounds with the [facility]/CTS services including the LVAD Coordinators, social workers, cardiologists, surgeons,  PT/OT/Speech, dietician, and unit charge nurses. Discussed patient status, plan of care, goals of care, including DC date, and post discharge needs. Plan of care will be discussed with the patient and/or family per the physician while rounding on the floor. This is a recurring meeting that is medically and socially necessary to collaborate with the interdisciplinary team to assist patient needs and safe discharge.

## 2023-11-29 LAB
ALBUMIN SERPL BCP-MCNC: 2.9 G/DL (ref 3.5–5.2)
ALLENS TEST: ABNORMAL
ALLENS TEST: ABNORMAL
ALP SERPL-CCNC: 79 U/L (ref 55–135)
ALT SERPL W/O P-5'-P-CCNC: 22 U/L (ref 10–44)
ANION GAP SERPL CALC-SCNC: 10 MMOL/L (ref 8–16)
ANION GAP SERPL CALC-SCNC: 11 MMOL/L (ref 8–16)
ANION GAP SERPL CALC-SCNC: 14 MMOL/L (ref 8–16)
APTT PPP: 32.3 SEC (ref 21–32)
APTT PPP: 53.7 SEC (ref 21–32)
APTT PPP: 54.3 SEC (ref 21–32)
ASCENDING AORTA: 3.2 CM
AST SERPL-CCNC: 26 U/L (ref 10–40)
AV INDEX (PROSTH): 0.41
AV MEAN GRADIENT: 3 MMHG
AV PEAK GRADIENT: 6 MMHG
AV VALVE AREA BY VELOCITY RATIO: 1.81 CM²
AV VALVE AREA: 1.41 CM²
AV VELOCITY RATIO: 0.52
BASOPHILS # BLD AUTO: 0.05 K/UL (ref 0–0.2)
BASOPHILS NFR BLD: 0.7 % (ref 0–1.9)
BILIRUB SERPL-MCNC: 0.5 MG/DL (ref 0.1–1)
BNP SERPL-MCNC: 309 PG/ML (ref 0–99)
BSA FOR ECHO PROCEDURE: 2 M2
BUN SERPL-MCNC: 61 MG/DL (ref 8–23)
BUN SERPL-MCNC: 64 MG/DL (ref 8–23)
BUN SERPL-MCNC: 65 MG/DL (ref 8–23)
CALCIUM SERPL-MCNC: 9.3 MG/DL (ref 8.7–10.5)
CALCIUM SERPL-MCNC: 9.3 MG/DL (ref 8.7–10.5)
CALCIUM SERPL-MCNC: 9.5 MG/DL (ref 8.7–10.5)
CHLORIDE SERPL-SCNC: 100 MMOL/L (ref 95–110)
CHLORIDE SERPL-SCNC: 98 MMOL/L (ref 95–110)
CHLORIDE SERPL-SCNC: 99 MMOL/L (ref 95–110)
CO2 SERPL-SCNC: 22 MMOL/L (ref 23–29)
CO2 SERPL-SCNC: 25 MMOL/L (ref 23–29)
CO2 SERPL-SCNC: 25 MMOL/L (ref 23–29)
CREAT SERPL-MCNC: 2.1 MG/DL (ref 0.5–1.4)
CREAT SERPL-MCNC: 2.3 MG/DL (ref 0.5–1.4)
CREAT SERPL-MCNC: 2.4 MG/DL (ref 0.5–1.4)
CV ECHO LV RWT: 0.29 CM
DELSYS: ABNORMAL
DIFFERENTIAL METHOD BLD: ABNORMAL
DOP CALC AO PEAK VEL: 1.24 M/S
DOP CALC AO VTI: 21 CM
DOP CALC LVOT AREA: 3.5 CM2
DOP CALC LVOT DIAMETER: 2.1 CM
DOP CALC LVOT PEAK VEL: 0.65 M/S
DOP CALC LVOT STROKE VOLUME: 29.63 CM3
DOP CALCLVOT PEAK VEL VTI: 8.56 CM
E WAVE DECELERATION TIME: 131.6 MSEC
E/A RATIO: 3.07
E/E' RATIO: 16.88 M/S
ECHO LV POSTERIOR WALL: 0.96 CM (ref 0.6–1.1)
EOSINOPHIL # BLD AUTO: 0.1 K/UL (ref 0–0.5)
EOSINOPHIL NFR BLD: 1.8 % (ref 0–8)
ERYTHROCYTE [DISTWIDTH] IN BLOOD BY AUTOMATED COUNT: 14.3 % (ref 11.5–14.5)
EST. GFR  (NO RACE VARIABLE): 29.9 ML/MIN/1.73 M^2
EST. GFR  (NO RACE VARIABLE): 31.5 ML/MIN/1.73 M^2
EST. GFR  (NO RACE VARIABLE): 35.2 ML/MIN/1.73 M^2
FRACTIONAL SHORTENING: 3 % (ref 28–44)
GLUCOSE SERPL-MCNC: 133 MG/DL (ref 70–110)
GLUCOSE SERPL-MCNC: 145 MG/DL (ref 70–110)
GLUCOSE SERPL-MCNC: 87 MG/DL (ref 70–110)
HCO3 UR-SCNC: 25.9 MMOL/L (ref 24–28)
HCO3 UR-SCNC: 27.1 MMOL/L (ref 24–28)
HCT VFR BLD AUTO: 27.4 % (ref 40–54)
HGB BLD-MCNC: 8.7 G/DL (ref 14–18)
IMM GRANULOCYTES # BLD AUTO: 0.04 K/UL (ref 0–0.04)
IMM GRANULOCYTES NFR BLD AUTO: 0.6 % (ref 0–0.5)
INTERVENTRICULAR SEPTUM: 0.8 CM (ref 0.6–1.1)
IVC DIAMETER: 1.8 CM
LA MAJOR: 7.48 CM
LA MINOR: 7.41 CM
LA WIDTH: 4.64 CM
LEFT ATRIUM SIZE: 4.08 CM
LEFT ATRIUM VOLUME INDEX MOD: 58.4 ML/M2
LEFT ATRIUM VOLUME INDEX: 59.9 ML/M2
LEFT ATRIUM VOLUME MOD: 116.84 CM3
LEFT ATRIUM VOLUME: 119.8 CM3
LEFT INTERNAL DIMENSION IN SYSTOLE: 6.3 CM (ref 2.1–4)
LEFT VENTRICLE DIASTOLIC VOLUME INDEX: 108.43 ML/M2
LEFT VENTRICLE DIASTOLIC VOLUME: 216.85 ML
LEFT VENTRICLE MASS INDEX: 121 G/M2
LEFT VENTRICLE SYSTOLIC VOLUME INDEX: 100.7 ML/M2
LEFT VENTRICLE SYSTOLIC VOLUME: 201.45 ML
LEFT VENTRICULAR INTERNAL DIMENSION IN DIASTOLE: 6.51 CM (ref 3.5–6)
LEFT VENTRICULAR MASS: 241.64 G
LV LATERAL E/E' RATIO: 11.25 M/S
LV SEPTAL E/E' RATIO: 33.75 M/S
LYMPHOCYTES # BLD AUTO: 0.9 K/UL (ref 1–4.8)
LYMPHOCYTES NFR BLD: 13 % (ref 18–48)
MAGNESIUM SERPL-MCNC: 2.4 MG/DL (ref 1.6–2.6)
MAGNESIUM SERPL-MCNC: 2.6 MG/DL (ref 1.6–2.6)
MCH RBC QN AUTO: 27.9 PG (ref 27–31)
MCHC RBC AUTO-ENTMCNC: 31.8 G/DL (ref 32–36)
MCV RBC AUTO: 88 FL (ref 82–98)
MONOCYTES # BLD AUTO: 0.6 K/UL (ref 0.3–1)
MONOCYTES NFR BLD: 8.4 % (ref 4–15)
MV PEAK A VEL: 0.44 M/S
MV PEAK E VEL: 1.35 M/S
MV STENOSIS PRESSURE HALF TIME: 38.16 MS
MV VALVE AREA P 1/2 METHOD: 5.77 CM2
NEUTROPHILS # BLD AUTO: 5.2 K/UL (ref 1.8–7.7)
NEUTROPHILS NFR BLD: 75.5 % (ref 38–73)
NRBC BLD-RTO: 0 /100 WBC
PCO2 BLDA: 39 MMHG (ref 35–45)
PCO2 BLDA: 40.2 MMHG (ref 35–45)
PH SMN: 7.43 [PH] (ref 7.35–7.45)
PH SMN: 7.44 [PH] (ref 7.35–7.45)
PISA MRMAX VEL: 0.04 M/S
PISA TR MAX VEL: 3.09 M/S
PLATELET # BLD AUTO: 297 K/UL (ref 150–450)
PMV BLD AUTO: 10 FL (ref 9.2–12.9)
PO2 BLDA: 29 MMHG (ref 40–60)
PO2 BLDA: 30 MMHG (ref 40–60)
POC BE: 2 MMOL/L
POC BE: 3 MMOL/L
POC SATURATED O2: 57 % (ref 95–100)
POC SATURATED O2: 61 % (ref 95–100)
POC TCO2: 27 MMOL/L (ref 24–29)
POC TCO2: 28 MMOL/L (ref 24–29)
POCT GLUCOSE: 122 MG/DL (ref 70–110)
POCT GLUCOSE: 141 MG/DL (ref 70–110)
POCT GLUCOSE: 142 MG/DL (ref 70–110)
POCT GLUCOSE: 198 MG/DL (ref 70–110)
POCT GLUCOSE: 83 MG/DL (ref 70–110)
POCT GLUCOSE: 93 MG/DL (ref 70–110)
POTASSIUM SERPL-SCNC: 4.4 MMOL/L (ref 3.5–5.1)
POTASSIUM SERPL-SCNC: 4.4 MMOL/L (ref 3.5–5.1)
POTASSIUM SERPL-SCNC: 4.6 MMOL/L (ref 3.5–5.1)
PROT SERPL-MCNC: 7.3 G/DL (ref 6–8.4)
RA MAJOR: 5.08 CM
RA PRESSURE ESTIMATED: 3 MMHG
RA WIDTH: 3.52 CM
RBC # BLD AUTO: 3.12 M/UL (ref 4.6–6.2)
RIGHT VENTRICULAR END-DIASTOLIC DIMENSION: 3.79 CM
RV TB RVSP: 6 MMHG
SAMPLE: ABNORMAL
SAMPLE: ABNORMAL
SINUS: 3.12 CM
SITE: ABNORMAL
SITE: ABNORMAL
SODIUM SERPL-SCNC: 134 MMOL/L (ref 136–145)
SODIUM SERPL-SCNC: 134 MMOL/L (ref 136–145)
SODIUM SERPL-SCNC: 136 MMOL/L (ref 136–145)
STJ: 2.36 CM
TDI LATERAL: 0.12 M/S
TDI SEPTAL: 0.04 M/S
TDI: 0.08 M/S
TR MAX PG: 38 MMHG
TRICUSPID ANNULAR PLANE SYSTOLIC EXCURSION: 1.84 CM
TV REST PULMONARY ARTERY PRESSURE: 41 MMHG
WBC # BLD AUTO: 6.82 K/UL (ref 3.9–12.7)
Z-SCORE OF LEFT VENTRICULAR DIMENSION IN END DIASTOLE: 1.1
Z-SCORE OF LEFT VENTRICULAR DIMENSION IN END SYSTOLE: 4.47

## 2023-11-29 PROCEDURE — 25000003 PHARM REV CODE 250: Mod: NTX | Performed by: STUDENT IN AN ORGANIZED HEALTH CARE EDUCATION/TRAINING PROGRAM

## 2023-11-29 PROCEDURE — 80048 BASIC METABOLIC PNL TOTAL CA: CPT | Mod: 91,NTX,XB | Performed by: INTERNAL MEDICINE

## 2023-11-29 PROCEDURE — 63600175 PHARM REV CODE 636 W HCPCS: Mod: NTX | Performed by: STUDENT IN AN ORGANIZED HEALTH CARE EDUCATION/TRAINING PROGRAM

## 2023-11-29 PROCEDURE — 83735 ASSAY OF MAGNESIUM: CPT | Mod: NTX | Performed by: STUDENT IN AN ORGANIZED HEALTH CARE EDUCATION/TRAINING PROGRAM

## 2023-11-29 PROCEDURE — 85730 THROMBOPLASTIN TIME PARTIAL: CPT | Mod: NTX | Performed by: STUDENT IN AN ORGANIZED HEALTH CARE EDUCATION/TRAINING PROGRAM

## 2023-11-29 PROCEDURE — 36556 INSERT NON-TUNNEL CV CATH: CPT | Mod: NTX

## 2023-11-29 PROCEDURE — 25000003 PHARM REV CODE 250: Mod: NTX | Performed by: INTERNAL MEDICINE

## 2023-11-29 PROCEDURE — 20000000 HC ICU ROOM: Mod: NTX

## 2023-11-29 PROCEDURE — 94761 N-INVAS EAR/PLS OXIMETRY MLT: CPT | Mod: NTX

## 2023-11-29 PROCEDURE — 83735 ASSAY OF MAGNESIUM: CPT | Mod: 91,NTX | Performed by: INTERNAL MEDICINE

## 2023-11-29 PROCEDURE — 85730 THROMBOPLASTIN TIME PARTIAL: CPT | Mod: 91,NTX | Performed by: INTERNAL MEDICINE

## 2023-11-29 PROCEDURE — 85025 COMPLETE CBC W/AUTO DIFF WBC: CPT | Mod: NTX | Performed by: STUDENT IN AN ORGANIZED HEALTH CARE EDUCATION/TRAINING PROGRAM

## 2023-11-29 PROCEDURE — 25000003 PHARM REV CODE 250: Mod: NTX | Performed by: REGISTERED NURSE

## 2023-11-29 PROCEDURE — 27000202 HC IABP, ADD'L DAY: Mod: NTX

## 2023-11-29 PROCEDURE — 80053 COMPREHEN METABOLIC PANEL: CPT | Mod: NTX | Performed by: STUDENT IN AN ORGANIZED HEALTH CARE EDUCATION/TRAINING PROGRAM

## 2023-11-29 PROCEDURE — 82803 BLOOD GASES ANY COMBINATION: CPT | Mod: NTX

## 2023-11-29 PROCEDURE — 99291 CRITICAL CARE FIRST HOUR: CPT | Mod: FS,NTX,, | Performed by: INTERNAL MEDICINE

## 2023-11-29 PROCEDURE — 83880 ASSAY OF NATRIURETIC PEPTIDE: CPT | Mod: NTX | Performed by: INTERNAL MEDICINE

## 2023-11-29 PROCEDURE — 25000003 PHARM REV CODE 250: Mod: NTX | Performed by: PHYSICIAN ASSISTANT

## 2023-11-29 PROCEDURE — 99900035 HC TECH TIME PER 15 MIN (STAT): Mod: NTX

## 2023-11-29 PROCEDURE — 99292 CRITICAL CARE ADDL 30 MIN: CPT | Mod: ,,, | Performed by: INTERNAL MEDICINE

## 2023-11-29 RX ORDER — LACTULOSE 10 G/15ML
15 SOLUTION ORAL ONCE
Status: COMPLETED | OUTPATIENT
Start: 2023-11-29 | End: 2023-11-29

## 2023-11-29 RX ADMIN — SENNOSIDES 8.6 MG: 8.6 TABLET, FILM COATED ORAL at 08:11

## 2023-11-29 RX ADMIN — THERA TABS 1 TABLET: TAB at 08:11

## 2023-11-29 RX ADMIN — SODIUM CHLORIDE: 9 INJECTION, SOLUTION INTRAVENOUS at 11:11

## 2023-11-29 RX ADMIN — INSULIN ASPART 12 UNITS: 100 INJECTION, SOLUTION INTRAVENOUS; SUBCUTANEOUS at 12:11

## 2023-11-29 RX ADMIN — INSULIN ASPART 2 UNITS: 100 INJECTION, SOLUTION INTRAVENOUS; SUBCUTANEOUS at 08:11

## 2023-11-29 RX ADMIN — HEPARIN SODIUM AND DEXTROSE 16 UNITS/KG/HR: 10000; 5 INJECTION INTRAVENOUS at 02:11

## 2023-11-29 RX ADMIN — AMIODARONE HYDROCHLORIDE 400 MG: 200 TABLET ORAL at 08:11

## 2023-11-29 RX ADMIN — POLYETHYLENE GLYCOL 3350 17 G: 17 POWDER, FOR SOLUTION ORAL at 08:11

## 2023-11-29 RX ADMIN — ATORVASTATIN CALCIUM 40 MG: 40 TABLET, FILM COATED ORAL at 08:11

## 2023-11-29 RX ADMIN — LACTULOSE 15 G: 20 SOLUTION ORAL at 12:11

## 2023-11-29 RX ADMIN — ASPIRIN 81 MG: 81 TABLET, COATED ORAL at 08:11

## 2023-11-29 RX ADMIN — OXYCODONE HYDROCHLORIDE 5 MG: 5 TABLET ORAL at 04:11

## 2023-11-29 RX ADMIN — Medication 400 MG: at 08:11

## 2023-11-29 RX ADMIN — INSULIN ASPART 12 UNITS: 100 INJECTION, SOLUTION INTRAVENOUS; SUBCUTANEOUS at 08:11

## 2023-11-29 RX ADMIN — HEPARIN SODIUM AND DEXTROSE 16 UNITS/KG/HR: 10000; 5 INJECTION INTRAVENOUS at 05:11

## 2023-11-29 RX ADMIN — GABAPENTIN 300 MG: 300 CAPSULE ORAL at 08:11

## 2023-11-29 RX ADMIN — ACETAMINOPHEN 650 MG: 325 TABLET ORAL at 08:11

## 2023-11-29 RX ADMIN — INSULIN ASPART 12 UNITS: 100 INJECTION, SOLUTION INTRAVENOUS; SUBCUTANEOUS at 04:11

## 2023-11-29 RX ADMIN — PANTOPRAZOLE SODIUM 40 MG: 40 TABLET, DELAYED RELEASE ORAL at 07:11

## 2023-11-29 RX ADMIN — DOBUTAMINE IN DEXTROSE 5 MCG/KG/MIN: 400 INJECTION, SOLUTION INTRAVENOUS at 05:11

## 2023-11-29 NOTE — PROGRESS NOTES
[facility] - Cardiac Intensive Care  Heart Transplant  Progress Note    Patient Name: Radha Abbott  MRN: [id number]  Admission Date: 11/9/2023  Hospital Length of Stay: 20 days  Attending Physician: Brayan Ocampo MD  Primary Care Provider: Vasu Kong MD  Principal Problem:Acute on chronic combined systolic and diastolic CHF, NYHA class 4    Subjective:   Interval History: No acute events overnight. Renal function trended up today likely from over diuresis. Stopping Lasix. Remains on support with IABP @ 1:1 and  5. Awaiting LVAD implant on Friday. Removing swan and placing new central line today.    Hemodynamics:   CVP 3  PA 46/15  CO 5.5  CI 2.8  SVR 1210    Continuous Infusions:   sodium chloride 0.9%      sodium chloride 0.9% 5 mL/hr at 11/29/23 0900    DOBUTamine 5 mcg/kg/min (11/29/23 0900)    heparin (porcine) in D5W 16 Units/kg/hr (11/29/23 0900)    sodium chloride 0.9% 250 mL/hr at 11/20/23 2002     Scheduled Meds:   amiodarone  400 mg Oral Daily    aspirin  81 mg Oral Daily    atorvastatin  40 mg Oral Daily    insulin aspart U-100  12 Units Subcutaneous TIDWM    insulin detemir U-100  20 Units Subcutaneous QHS    lactulose  15 g Oral Once    magnesium oxide  400 mg Oral Daily    multivitamin  1 tablet Oral Daily    pantoprazole  40 mg Oral Before breakfast    polyethylene glycol  17 g Oral Daily    senna  8.6 mg Oral Daily     PRN Meds:0.9%  NaCl infusion (for blood administration), sodium chloride 0.9%, acetaminophen, baclofen, bisacodyL, dextrose 10%, dextrose 10%, gabapentin, glucagon (human recombinant), glucose, glucose, heparin (PORCINE), heparin (PORCINE), insulin aspart U-100, ondansetron, oxyCODONE, senna-docusate 8.6-50 mg, sodium chloride 0.9%, sodium chloride 0.9%    Review of patient's allergies indicates:  No Known Allergies  Objective:     Vital Signs (Most Recent):  Temp: 98.4 °F (36.9 °C) (11/29/23 0700)  Pulse: 90 (11/29/23 0900)  Resp: 19 (11/29/23 0900)  BP: 119/62 (11/29/23  "0900)  SpO2: 100 % (11/29/23 0900) Vital Signs (24h Range):  Temp:  [98.1 °F (36.7 °C)-98.7 °F (37.1 °C)] 98.4 °F (36.9 °C)  Pulse:  [82-92] 90  Resp:  [10-27] 19  SpO2:  [97 %-100 %] 100 %  BP: (108-128)/(51-74) 119/62     Patient Vitals for the past 72 hrs (Last 3 readings):   Weight   11/29/23 0707 78.5 kg (173 lb)   11/28/23 0522 78.5 kg (173 lb 1 oz)   11/27/23 0400 76.5 kg (168 lb 10.4 oz)       Body mass index is 23.46 kg/m².      Intake/Output Summary (Last 24 hours) at 11/29/2023 1145  Last data filed at 11/29/2023 1100  Gross per 24 hour   Intake 1198.86 ml   Output 1600 ml   Net -401.14 ml         Hemodynamic Parameters:  PAP: (43-72)/(15-35) 60/21  PAP (Mean):  [26 mmHg-51 mmHg] 38 mmHg  CVP:  [4 mmHg] 4 mmHg    Telemetry: NSR        Physical Exam  Constitutional:       Appearance: Normal appearance. He is ill-appearing.   HENT:      Head: Normocephalic.   Cardiovascular:      Rate and Rhythm: Normal rate and regular rhythm.      Pulses: Normal pulses.      Comments: IABP   Pulmonary:      Effort: Pulmonary effort is normal.      Breath sounds: Normal breath sounds.   Abdominal:      General: Bowel sounds are normal.      Palpations: Abdomen is soft.   Musculoskeletal:         General: Normal range of motion.      Cervical back: Neck supple.   Skin:     General: Skin is warm and dry.   Neurological:      General: No focal deficit present.      Mental Status: He is alert and oriented to person, place, and time.            Significant Labs:  CBC:  Recent Labs   Lab 11/27/23  0211 11/27/23  0837 11/28/23  0201 11/29/23  0400   WBC 7.39  --  6.78 6.82   RBC 3.03*  --  3.08* 3.12*   HGB 8.5*  --  8.6* 8.7*   HCT 26.4* 28* 26.7* 27.4*     --  284 297   MCV 87  --  87 88   MCH 28.1  --  27.9 27.9   MCHC 32.2  --  32.2 31.8*       BNP:  No results for input(s): "BNP" in the last 168 hours.    Invalid input(s): "BNPTRIAGELBLO"  CMP:  Recent Labs   Lab 11/27/23  0211 11/28/23  0201 11/28/23  1456 " "11/28/23 2137 11/29/23  0400   * 119* 74  --  133*   CALCIUM 9.3 9.1 9.7  --  9.3   ALBUMIN 2.8* 2.9*  --   --  2.9*   PROT 7.1 7.1  --   --  7.3   * 135* 136  --  134*   K 4.3 4.2 3.9 4.6 4.4   CO2 22* 24 23  --  25   CL 98 98 98  --  99   BUN 78* 74* 69*  --  64*   CREATININE 2.3* 2.2* 2.2*  --  2.4*   ALKPHOS 78 79  --   --  79   ALT 21 21  --   --  22   AST 25 25  --   --  26   BILITOT 0.5 0.4  --   --  0.5        Coagulation:   Recent Labs   Lab 11/27/23  0211 11/28/23  0201 11/29/23 0400   APTT 53.0* 49.3* 53.7*       LDH:  No results for input(s): "LDH" in the last 72 hours.  Microbiology:  Microbiology Results (last 7 days)       Procedure Component Value Units Date/Time    Blood culture [[id number]] Collected: 11/28/23 1452    Order Status: Sent Specimen: Blood from Peripheral, Upper Arm, Right Updated: 11/28/23 1518    Blood culture [[id number]] Collected: 11/28/23 1450    Order Status: Sent Specimen: Blood from Peripheral, Forearm, Right Updated: 11/28/23 1517            BMP:   Recent Labs   Lab 11/29/23 0400   *   *   K 4.4   CL 99   CO2 25   BUN 64*   CREATININE 2.4*   CALCIUM 9.3   MG 2.4       I have reviewed all pertinent labs within the past 24 hours.    Estimated Creatinine Clearance: 35.5 mL/min (A) (based on SCr of 2.4 mg/dL (H)).    Diagnostic Results:  I have reviewed all pertinent imaging results/findings within the past 24 hours.  Assessment and Plan:     63 year old male with hx of CAD s/p 3v CABG (unclear anatomy, 2009), ICM with a recent EF of 15-20% s/p ICD (medtronik 2009), DM2 (a1c 7.7), HTN, HLD, Vfib on amio who presents to the ED with CC of SOB     Pt was recently [place] to [facility] [place].  He was started on a Lasix gtt and did well.  Started on gDMT and discharged home on  5 with plans to follow up in HTS clinic for ongoing transplant evaluation at another facility.  He says that about a few days ago he started to notice LE swelling.  This turned " into Nelson and orthopnea.  He says he can't walk to the bathroom without getting SOB.  He also complains of weight gain.  He takes torsemide 20mg BID at home and was told to trial additional Lasix which he did without any improvement.  He was rx metolazone but has not been filled.  He came to the ED     In the ED he was AF with stable VS on RA.  CBC showing chronic anemia.  CMP notable for acute on chronic CKD with baseline around 2.1 and Cr now 2.6.  BNP elevated.  Lactate neg.  CXR showing pulmonary edema.  I evaluated the pt at the bedside.  Bedside TTE showing CVP >15.  He was subsequently admitted to the CCU for diuresis.     He was continued on his home  and was started on a lasix gtt, which he responded well to overnight (net -1700cc. He feels much better this morning as well. Our transplant coordinators have been working on insurance approval and he is now being transferred to [facility] service for transplant evaluation.     * Acute on chronic combined systolic and diastolic CHF, NYHA class 4  Pt with known ICM with an EF of 25% on home  presenting with decompensated HF.  Not in cardiogenic shock    RHC 11/14: RA 14, PA 70/35, PCWP 32, CO 4.1, CI 2.1.   Repeat RHC 11/20 RA RA  20  PA 60/29 (39)  PCWP 29    CO  4.6 l/min  CI  2.3 l/min/m2  2D echo 11/21 showed EF 15-20%, mild RV dysfunction, mild MR, LVEDD 6.9     Repeat on 11/24 showed EF 10-15%, RV mildly reduced, mild-mod MR, LVEDD 6.42  - Home  gtt at 5  - home GDMT: entresto 24/26mg BID (on hold 2/2 IVÁN), Hydralazine 25 q8h being continued  - home diuretics: Was taking lasix 40 bid  - s/p IABP placement 11/21. Augmenting 100s at 1:1  - Continue VAD/OHTx workup. (Awaiting OR date)   -Blood type A+  -PT/OT and nutrition   -Continue current support with  5,  IABP 1:1  -stopping Lasix   -Monitor strict I/Os  -Plans for LVAD placement Friday     PAF (paroxysmal atrial fibrillation)  Known hx of pAF. In sinus rhythm on admission, but 1 run of AF RVR  overnight. He spontaneously converted.  - Continue home amiodarone 400mg qd  - Stop home Eliquis and continue heparin gtt while in the hospital.       IVÁN (acute kidney injury)  IVÁN on CKD2. Baseline Cr of 1.7-2.0.   -Currently 2.4 today  - Hold ARNi entresto  -Trend BMPs daily     Type 2 diabetes mellitus without complication, without long-term current use of insulin  -A1c 7.6, not insulin dependent  - MDSSI  -diabetic diet ordered/Boost glucose control   - Endocrine following, appreciate assistance with blood glucose management    CAD (coronary artery disease)  -S/p 3vCABG in 2009  - continue home ASA, HI statin    Ventricular tachycardia  Hx VT s/p ICD placement (medtronic 2009)  - Continue home amio 400mg qd    .Uninterrupted Critical Care/Counseling Time (not including procedures): 50 mins       Hugh Duran NP  Heart Transplant  [facility] - Cardiac Intensive Care

## 2023-11-29 NOTE — NURSING
Nurses Note -- 4 Eyes      11/27/2023  2:30 PM      Skin assessed during: Daily Assessment      [x] No Altered Skin Integrity Present    [x]Prevention Measures Documented      [] Yes- Altered Skin Integrity Present or Discovered   [] LDA Added if Not in Epic (Describe Wound)   [] New Altered Skin Integrity was Present on Admit and Documented in LDA   [] Wound Image Taken    Wound Care Consulted? No    Attending Nurse:  KYAW Fajardo    Second RN/Staff Member:  KYAW Moya

## 2023-11-29 NOTE — EICU
Intervention Initiated From:  Bedside    Theo intervened regarding:  Documentation and Time-Out    Nurse Notified:  No    Doctor Notified:  No    Comments: Dr. Connor Gillies at bedside to perform central line to RIJ.

## 2023-11-29 NOTE — NURSING
Nurses Note -- 4 Eyes      11/28/2023  2:29 PM      Skin assessed during: Daily Assessment      [x] No Altered Skin Integrity Present    [x]Prevention Measures Documented      [] Yes- Altered Skin Integrity Present or Discovered   [] LDA Added if Not in Epic (Describe Wound)   [] New Altered Skin Integrity was Present on Admit and Documented in LDA   [] Wound Image Taken    Wound Care Consulted? No    Attending Nurse:  KYAW Fajardo    Second RN/Staff Member:  KYAW Underwood

## 2023-11-29 NOTE — PT/OT/SLP PROGRESS
Occupational Therapy      Patient Name:  Radha Abbott   MRN:  [id number]    Patient not seen today secondary to still only appropriate for one skilled discipline secondary to femoral IABP and subsequent restrictions. PT is following patient. Will follow up as appropriate.     11/29/2023

## 2023-11-29 NOTE — PROGRESS NOTES
11/29/2023  Monet Aguiar    Current provider:  Brayan Ocampo MD    Device interrogation:       No data to display                   Rounded on Ledric Abbott to ensure all mechanical assist device settings (IABP or VAD) were appropriate and all parameters were within limits.  I was able to ensure all back up equipment was present, the staff had no issues, and the Perfusion Department daily rounding was complete.      For implantable VADs: Interrogation of Ventricular assist device was performed with analysis of device parameters and review of device function. I have personally reviewed the interrogation findings and agree with findings as stated.     In emergency, the nursing units have been notified to contact the perfusion department either by:  Calling [phone] from 630am to 4pm Mon thru Fri, utilizing the On-Call Finder functionality of Epic and searching for Perfusion, or by contacting the hospital  from 4pm to 630am and on weekends and asking to speak with the perfusionist on call.    3:58 PM

## 2023-11-29 NOTE — SUBJECTIVE & OBJECTIVE
Interval History: No acute events overnight. Renal function trended today likely from over diuresis. Stopping Lasix. Remains on support with IABP @ 1:1 and  5. Awaiting LVAD implant on Friday. Removing swan and placing new central line today.    Hemodynamics:   CVP 3  PA 46/15  CO 5.5  CI 2.8  SVR 1210    Continuous Infusions:   sodium chloride 0.9%      sodium chloride 0.9% 5 mL/hr at 11/29/23 0900    DOBUTamine 5 mcg/kg/min (11/29/23 0900)    heparin (porcine) in D5W 16 Units/kg/hr (11/29/23 0900)    sodium chloride 0.9% 250 mL/hr at 11/20/23 2002     Scheduled Meds:   amiodarone  400 mg Oral Daily    aspirin  81 mg Oral Daily    atorvastatin  40 mg Oral Daily    insulin aspart U-100  12 Units Subcutaneous TIDWM    insulin detemir U-100  20 Units Subcutaneous QHS    lactulose  15 g Oral Once    magnesium oxide  400 mg Oral Daily    multivitamin  1 tablet Oral Daily    pantoprazole  40 mg Oral Before breakfast    polyethylene glycol  17 g Oral Daily    senna  8.6 mg Oral Daily     PRN Meds:0.9%  NaCl infusion (for blood administration), sodium chloride 0.9%, acetaminophen, baclofen, bisacodyL, dextrose 10%, dextrose 10%, gabapentin, glucagon (human recombinant), glucose, glucose, heparin (PORCINE), heparin (PORCINE), insulin aspart U-100, ondansetron, oxyCODONE, senna-docusate 8.6-50 mg, sodium chloride 0.9%, sodium chloride 0.9%    Review of patient's allergies indicates:  No Known Allergies  Objective:     Vital Signs (Most Recent):  Temp: 98.4 °F (36.9 °C) (11/29/23 0700)  Pulse: 90 (11/29/23 0900)  Resp: 19 (11/29/23 0900)  BP: 119/62 (11/29/23 0900)  SpO2: 100 % (11/29/23 0900) Vital Signs (24h Range):  Temp:  [98.1 °F (36.7 °C)-98.7 °F (37.1 °C)] 98.4 °F (36.9 °C)  Pulse:  [82-92] 90  Resp:  [10-27] 19  SpO2:  [97 %-100 %] 100 %  BP: (108-128)/(51-74) 119/62     Patient Vitals for the past 72 hrs (Last 3 readings):   Weight   11/29/23 0707 78.5 kg (173 lb)   11/28/23 0522 78.5 kg (173 lb 1 oz)   11/27/23  "0400 76.5 kg (168 lb 10.4 oz)       Body mass index is 23.46 kg/m².      Intake/Output Summary (Last 24 hours) at 11/29/2023 1145  Last data filed at 11/29/2023 1100  Gross per 24 hour   Intake 1198.86 ml   Output 1600 ml   Net -401.14 ml         Hemodynamic Parameters:  PAP: (43-72)/(15-35) 60/21  PAP (Mean):  [26 mmHg-51 mmHg] 38 mmHg  CVP:  [4 mmHg] 4 mmHg    Telemetry: NSR        Physical Exam  Constitutional:       Appearance: Normal appearance. He is ill-appearing.   HENT:      Head: Normocephalic.   Cardiovascular:      Rate and Rhythm: Normal rate and regular rhythm.      Pulses: Normal pulses.      Comments: IABP   Pulmonary:      Effort: Pulmonary effort is normal.      Breath sounds: Normal breath sounds.   Abdominal:      General: Bowel sounds are normal.      Palpations: Abdomen is soft.   Musculoskeletal:         General: Normal range of motion.      Cervical back: Neck supple.   Skin:     General: Skin is warm and dry.   Neurological:      General: No focal deficit present.      Mental Status: He is alert and oriented to person, place, and time.            Significant Labs:  CBC:  Recent Labs   Lab 11/27/23  0211 11/27/23  0837 11/28/23  0201 11/29/23  0400   WBC 7.39  --  6.78 6.82   RBC 3.03*  --  3.08* 3.12*   HGB 8.5*  --  8.6* 8.7*   HCT 26.4* 28* 26.7* 27.4*     --  284 297   MCV 87  --  87 88   MCH 28.1  --  27.9 27.9   MCHC 32.2  --  32.2 31.8*       BNP:  No results for input(s): "BNP" in the last 168 hours.    Invalid input(s): "BNPTRIAGELBLO"  CMP:  Recent Labs   Lab 11/27/23  0211 11/28/23  0201 11/28/23  1456 11/28/23  2137 11/29/23  0400   * 119* 74  --  133*   CALCIUM 9.3 9.1 9.7  --  9.3   ALBUMIN 2.8* 2.9*  --   --  2.9*   PROT 7.1 7.1  --   --  7.3   * 135* 136  --  134*   K 4.3 4.2 3.9 4.6 4.4   CO2 22* 24 23  --  25   CL 98 98 98  --  99   BUN 78* 74* 69*  --  64*   CREATININE 2.3* 2.2* 2.2*  --  2.4*   ALKPHOS 78 79  --   --  79   ALT 21 21  --   --  22   AST " "25 25  --   --  26   BILITOT 0.5 0.4  --   --  0.5        Coagulation:   Recent Labs   Lab 11/27/23  0211 11/28/23  0201 11/29/23  0400   APTT 53.0* 49.3* 53.7*       LDH:  No results for input(s): "LDH" in the last 72 hours.  Microbiology:  Microbiology Results (last 7 days)       Procedure Component Value Units Date/Time    Blood culture [[id number]] Collected: 11/28/23 1452    Order Status: Sent Specimen: Blood from Peripheral, Upper Arm, Right Updated: 11/28/23 1518    Blood culture [[id number]] Collected: 11/28/23 1450    Order Status: Sent Specimen: Blood from Peripheral, Forearm, Right Updated: 11/28/23 1517            BMP:   Recent Labs   Lab 11/29/23 0400   *   *   K 4.4   CL 99   CO2 25   BUN 64*   CREATININE 2.4*   CALCIUM 9.3   MG 2.4       I have reviewed all pertinent labs within the past 24 hours.    Estimated Creatinine Clearance: 35.5 mL/min (A) (based on SCr of 2.4 mg/dL (H)).    Diagnostic Results:  I have reviewed all pertinent imaging results/findings within the past 24 hours.  "

## 2023-11-29 NOTE — CARE UPDATE
-Glucose Goal 140-180    -A1C:   Hemoglobin A1C   Date Value Ref Range Status   10/12/2023 7.6 (H) 4.0 - 5.6 % Final     Comment:     ADA Screening Guidelines:  5.7-6.4%  Consistent with prediabetes  >or=6.5%  Consistent with diabetes    High levels of fetal hemoglobin interfere with the HbA1C  assay. Heterozygous hemoglobin variants (HbS, HgC, etc)do  not significantly interfere with this assay.   However, presence of multiple variants may affect accuracy.           -HOME REGIMEN:   Metformin (off since October)     -GLUCOSE TREND FOR THE PAST 24HRS:   Recent Labs   Lab 11/28/23  0821 11/28/23  1232 11/28/23  1748 11/28/23  2133 11/29/23  0359 11/29/23  0811   POCTGLUCOSE 178* 167* 168* 152* 141* 198*           -NO HYPOGYCEMIAS NOTED     - Diet  Diet diabetic 2800 Calorie; Fluid - 2000mL; Standard Tray    -TOLERATING 75 % OF PO DIET     -NPO? No    -Steroids - None      -Tube Feeds - None         Plan:   Levemir (Insulin Detemir)  20 units nightly  - Continue Novolog (Insulin Aspart) 12 units TIDWM and prn for BG excursions [facility] SSI (150/25)  - BG checks AC/HS  - Hypoglycemia protocol in place     ** Please notify Endocrine for any change and/or advance in diet**  ** Please call Endocrine for any BG related issues **     Discharge Planning:   TBD. Please notify endocrinology prior to discharge.

## 2023-11-29 NOTE — ASSESSMENT & PLAN NOTE
Pt with known ICM with an EF of 25% on home  presenting with decompensated HF.  Not in cardiogenic shock    RHC 11/14: RA 14, PA 70/35, PCWP 32, CO 4.1, CI 2.1.   Repeat RHC 11/20 RA RA  20  PA 60/29 (39)  PCWP 29    CO  4.6 l/min  CI  2.3 l/min/m2  2D echo 11/21 showed EF 15-20%, mild RV dysfunction, mild MR, LVEDD 6.9     Repeat on 11/24 showed EF 10-15%, RV mildly reduced, mild-mod MR, LVEDD 6.42  - Home  gtt at 5  - home GDMT: entresto 24/26mg BID (on hold 2/2 IVÁN), Hydralazine 25 q8h being continued  - home diuretics: Was taking lasix 40 bid  - s/p IABP placement 11/21. Augmenting 100s at 1:1  - Continue VAD/OHTx workup. (Awaiting OR date)   -Blood type A+  -PT/OT and nutrition   -Continue current support with  5,  IABP 1:1  -stopping Lasix   -Monitor strict I/Os  -Plans for LVAD placement Friday

## 2023-11-29 NOTE — NURSING
Nurses Note -- 4 Eyes      11/29/2023   2:28 PM      Skin assessed during: Daily Assessment      [x] No Altered Skin Integrity Present    [x]Prevention Measures Documented      [] Yes- Altered Skin Integrity Present or Discovered   [] LDA Added if Not in Epic (Describe Wound)   [] New Altered Skin Integrity was Present on Admit and Documented in LDA   [] Wound Image Taken    Wound Care Consulted? No    Attending Nurse:  Tana CARD    Second RN/Staff Member:  KYAW Underwood

## 2023-11-29 NOTE — ASSESSMENT & PLAN NOTE
IVÁN on CKD2. Baseline Cr of 1.7-2.0.   -Currently 2.4 today  - Hold ARNi, entresto  -Trend BMPs daily

## 2023-11-29 NOTE — PROCEDURES
Radha Abbott is a 63 y.o. male patient.    Temp: 98.1 °F (36.7 °C) (11/29/23 1500)  Pulse: 89 (11/29/23 1500)  Resp: 18 (11/29/23 1611)  BP: (!) 133/59 (11/29/23 1500)  SpO2: 100 % (11/29/23 1500)  Weight: 78.5 kg (173 lb) (11/29/23 0707)  Height: 6' (182.9 cm) (11/29/23 0707)    Central Line    Date/Time: 11/29/2023 4:56 PM    Performed by: Gillies, Connor M, MD  Authorized by: Gillies, Connor M, MD    Location procedure was performed:  [facility] CARDIAC ICU  Pre-operative diagnosis:  Cardiogenic shock  Consent Done ?:  Yes  Time out complete?: Verified correct patient, procedure, equipment, staff, and site/side    Indications:  Hemodynamic monitoring  Anesthesia:  Local infiltration  Local anesthetic:  Lidocaine 1% without epinephrine  Preparation:  Skin prepped with ChloraPrep  Skin prep agent dried: Skin prep agent completely dried prior to procedure    Sterile barriers: All five maximal sterile barriers used - gloves, gown, cap, mask and large sterile sheet    Hand hygiene: Hand hygiene performed immediately prior to central venous catheter insertion    Location:  Right internal jugular  Catheter type:  Triple lumen  Catheter size:  7 Fr  Ultrasound guidance: Yes    Vessel Caliber:  Medium   patent  Comprressibility:  Normal  Needle advanced into vessel with real time ultrasound guidance.    Guidewire confirmed in vessel.    Image recorded and saved.    Steril sheath on probe.    Sterile gel used.  Manometry: Yes    Number of attempts:  1  Securement:  Line sutured, chlorhexidine patch, sterile dressing applied and blood return through all ports  Complications: No    Specimens: No    Implants: No    XRay:  Placement verified by x-ray  Adverse Events:  NoneTermination Site: superior vena cava           No data to display                  11/29/2023

## 2023-11-29 NOTE — PROGRESS NOTES
Process Group Note    PATIENT'S NAME: Debra Pineda  MRN:   [id number]  :   1986  ACCT. NUMBER: [id number]  DATE OF SERVICE: 21  START TIME:  1:00 PM  END TIME:  1:50 PM  FACILITATOR: Maria Alejandra Valdez LICSW  TOPIC:  Process Group    Diagnoses:  . Principal DSM5 Diagnoses  (Sustained by DSM5 Criteria Listed Above):   296.32 (F33.1) Major Depressive Disorder, Recurrent Episode, Moderate;  300.02 (F41.1) Generalized Anxiety Disorder    4. Other Diagnoses that is relevant to services: **Per patient self-report:   Attention-Deficit/Hyperactivity Disorder  314.00 (F90.0) Predominantly inattentive presentation  And 307.51 (F50.8) Binge-Eating Disorder Severity: Moderate      [facility] Adult Mental Health Day Treatment  TRACK: 5B                              Service Modality:  Video Visit     Telemedicine Visit: The patient's condition can be safely assessed and treated via synchronous audio and visual telemedicine encounter.      Reason for Telemedicine Visit: Services only offered telehealth    Originating Site (Patient Location): Patient's home    Distant Site (Provider Location): [facility] MENT[facility]    Consent:  The patient/guardian has verbally consented to: the potential risks and benefits of telemedicine (video visit) versus in person care; bill my insurance or make self-payment for services provided; and responsibility for payment of non-covered services.     Patient would like the video invitation sent by:  My Chart    Mode of Communication:  Video Conference via Medical Zoom    As the provider I attest to compliance with applicable laws and regulations related to telemedicine.         NUMBER OF PARTICIPANTS: 4          Data:    Session content: At the start of this group, patients were invited to check in by identifying themselves, describing their current emotional status, and identifying issues to address in this group.   Area(s) of treatment focus  Visited patient at bedside. Patient and wife were just informed of VAD date. They had no questions at this time, informed them of the [facility] room arrangements being made. Team will continue to round on patient throughput the week.   "addressed in this session included Symptom Management and Personal Safety.  Pt reports feeling depressed, anxious and overwhelmed. She has been binging, isolating and \"zoning out\". Pt is not on any medication. She has struggled with side effects in the past, however has had gene sight testing and will see provider in a couple of weeks. She describes a history of loss of libido and or stomach issues. Pt has cancelled going to the gym and going climbing. She is disappointed that the [facility] eating disorder program was not as helpful as she hoped. She is interested in the [facility], but couldn't navigate their system. Agrees that this writer can call [facility]. Pt admits to getting her period yesterday and feels this may be contributing to her depression. Sets goal to perm her eyelashes. Denies safety concerns.    Therapeutic Interventions/Treatment Strategies:  Psychotherapist offered support, feedback and validation and reinforced use of skills. Treatment modalities used include Cognitive Behavioral Therapy. Interventions include Coping Skills: Discussed use of self-soothe skills to decrease distress in the body and Assisted patient in identifying 1-2 healthy distraction skills to reduce overall distress.    Assessment:    Patient response:   Patient responded to session by accepting feedback, listening and appearing disengaged    Possible barriers to participation / learning include: and no barriers identified    Health Issues:   Yes: Weight / dietary issues, Associated Psychological Distress       Substance Use Review:   Substance Use: No active concerns identified.    Mental Status/Behavioral Observations  Appearance:   Appropriate   Eye Contact:   Fair   Psychomotor Behavior: Retarded (Slowed)   Attitude:   Evasive Hostile  Orientation:   All  Speech   Rate / Production: Normal    Volume:  Normal   Mood:    Angry  Anxious  Depressed  Irritable   Affect:    Subdued   Thought Content:   Rumination  Thought " Form:  Coherent  Obsessive     Insight:    Fair     Plan:     Safety Plan: No current safety concerns identified.  Recommended that patient call 911 or go to the local ED should there be a change in any of these risk factors.     Barriers to treatment: None identified    Patient Contracts (see media tab):  None    Substance Use: Not addressed in session     Continue or Discharge: Patient will continue in Adult Day Treatment (ADT)  as planned. Patient is likely to benefit from learning and using skills as they work toward the goals identified in their treatment plan.      Maria Alejandra Hernandez, [facility]  August 18, 2021

## 2023-11-29 NOTE — PLAN OF CARE
HTS Care Update Note     CVP: 3  SVO2: 54  CI: 2.6  CO: 5.2  SVR: 1173    Infusions: hep gtt,  5    Mechanical support: IABP 1:1     Plan:  No changes made, continue current plan of care  Plan discussed with attending     Omid Manzanares MD  Cardiovascular Disease PGY-V  [facility]

## 2023-11-30 ENCOUNTER — ANESTHESIA EVENT (OUTPATIENT)
Dept: SURGERY | Facility: HOSPITAL | Age: 63
DRG: 001 | End: 2023-11-30
Payer: MEDICAID

## 2023-11-30 ENCOUNTER — DOCUMENTATION ONLY (OUTPATIENT)
Dept: CARDIOTHORACIC SURGERY | Facility: CLINIC | Age: 63
End: 2023-11-30
Payer: MEDICAID

## 2023-11-30 ENCOUNTER — EXTERNAL HOME HEALTH (OUTPATIENT)
Dept: HOME HEALTH SERVICES | Facility: HOSPITAL | Age: 63
End: 2023-11-30
Payer: MEDICAID

## 2023-11-30 LAB
ABO + RH BLD: NORMAL
ALBUMIN SERPL BCP-MCNC: 2.9 G/DL (ref 3.5–5.2)
ALLENS TEST: ABNORMAL
ALP SERPL-CCNC: 78 U/L (ref 55–135)
ALT SERPL W/O P-5'-P-CCNC: 23 U/L (ref 10–44)
ANION GAP SERPL CALC-SCNC: 10 MMOL/L (ref 8–16)
ANION GAP SERPL CALC-SCNC: 11 MMOL/L (ref 8–16)
APTT PPP: 48.8 SEC (ref 21–32)
APTT PPP: 48.8 SEC (ref 21–32)
AST SERPL-CCNC: 30 U/L (ref 10–40)
BASOPHILS # BLD AUTO: 0.06 K/UL (ref 0–0.2)
BASOPHILS NFR BLD: 1 % (ref 0–1.9)
BILIRUB SERPL-MCNC: 0.5 MG/DL (ref 0.1–1)
BLD GP AB SCN CELLS X3 SERPL QL: NORMAL
BUN SERPL-MCNC: 58 MG/DL (ref 8–23)
BUN SERPL-MCNC: 60 MG/DL (ref 8–23)
CALCIUM SERPL-MCNC: 9.2 MG/DL (ref 8.7–10.5)
CALCIUM SERPL-MCNC: 9.4 MG/DL (ref 8.7–10.5)
CHLORIDE SERPL-SCNC: 100 MMOL/L (ref 95–110)
CHLORIDE SERPL-SCNC: 103 MMOL/L (ref 95–110)
CO2 SERPL-SCNC: 21 MMOL/L (ref 23–29)
CO2 SERPL-SCNC: 24 MMOL/L (ref 23–29)
CREAT SERPL-MCNC: 2.1 MG/DL (ref 0.5–1.4)
CREAT SERPL-MCNC: 2.3 MG/DL (ref 0.5–1.4)
DELSYS: ABNORMAL
DIFFERENTIAL METHOD BLD: ABNORMAL
EOSINOPHIL # BLD AUTO: 0.1 K/UL (ref 0–0.5)
EOSINOPHIL NFR BLD: 2.1 % (ref 0–8)
ERYTHROCYTE [DISTWIDTH] IN BLOOD BY AUTOMATED COUNT: 14.5 % (ref 11.5–14.5)
EST. GFR  (NO RACE VARIABLE): 31.5 ML/MIN/1.73 M^2
EST. GFR  (NO RACE VARIABLE): 35.2 ML/MIN/1.73 M^2
GLUCOSE SERPL-MCNC: 176 MG/DL (ref 70–110)
GLUCOSE SERPL-MCNC: 97 MG/DL (ref 70–110)
HCO3 UR-SCNC: 24.7 MMOL/L (ref 24–28)
HCO3 UR-SCNC: 26.2 MMOL/L (ref 24–28)
HCO3 UR-SCNC: 26.2 MMOL/L (ref 24–28)
HCT VFR BLD AUTO: 26.2 % (ref 40–54)
HGB BLD-MCNC: 8.4 G/DL (ref 14–18)
IMM GRANULOCYTES # BLD AUTO: 0.04 K/UL (ref 0–0.04)
IMM GRANULOCYTES NFR BLD AUTO: 0.6 % (ref 0–0.5)
INR PPP: 1.1 (ref 0.8–1.2)
LYMPHOCYTES # BLD AUTO: 1.2 K/UL (ref 1–4.8)
LYMPHOCYTES NFR BLD: 19.6 % (ref 18–48)
MAGNESIUM SERPL-MCNC: 2.5 MG/DL (ref 1.6–2.6)
MCH RBC QN AUTO: 28.2 PG (ref 27–31)
MCHC RBC AUTO-ENTMCNC: 32.1 G/DL (ref 32–36)
MCV RBC AUTO: 88 FL (ref 82–98)
MONOCYTES # BLD AUTO: 0.7 K/UL (ref 0.3–1)
MONOCYTES NFR BLD: 11 % (ref 4–15)
NEUTROPHILS # BLD AUTO: 4.1 K/UL (ref 1.8–7.7)
NEUTROPHILS NFR BLD: 65.7 % (ref 38–73)
NRBC BLD-RTO: 0 /100 WBC
PCO2 BLDA: 37.2 MMHG (ref 35–45)
PCO2 BLDA: 38.6 MMHG (ref 35–45)
PCO2 BLDA: 39.2 MMHG (ref 35–45)
PH SMN: 7.43 [PH] (ref 7.35–7.45)
PH SMN: 7.43 [PH] (ref 7.35–7.45)
PH SMN: 7.44 [PH] (ref 7.35–7.45)
PLATELET # BLD AUTO: 281 K/UL (ref 150–450)
PMV BLD AUTO: 10.2 FL (ref 9.2–12.9)
PO2 BLDA: 26 MMHG (ref 40–60)
PO2 BLDA: 26 MMHG (ref 40–60)
PO2 BLDA: 27 MMHG (ref 40–60)
POC BE: 0 MMOL/L
POC BE: 2 MMOL/L
POC BE: 2 MMOL/L
POC SATURATED O2: 49 % (ref 95–100)
POC SATURATED O2: 50 % (ref 95–100)
POC SATURATED O2: 53 % (ref 95–100)
POC TCO2: 26 MMOL/L (ref 24–29)
POC TCO2: 27 MMOL/L (ref 24–29)
POC TCO2: 27 MMOL/L (ref 24–29)
POCT GLUCOSE: 207 MG/DL (ref 70–110)
POCT GLUCOSE: 246 MG/DL (ref 70–110)
POCT GLUCOSE: 266 MG/DL (ref 70–110)
POCT GLUCOSE: 75 MG/DL (ref 70–110)
POTASSIUM SERPL-SCNC: 4.4 MMOL/L (ref 3.5–5.1)
POTASSIUM SERPL-SCNC: 4.8 MMOL/L (ref 3.5–5.1)
PROT SERPL-MCNC: 7.2 G/DL (ref 6–8.4)
PROTHROMBIN TIME: 11.4 SEC (ref 9–12.5)
RBC # BLD AUTO: 2.98 M/UL (ref 4.6–6.2)
SAMPLE: ABNORMAL
SITE: ABNORMAL
SODIUM SERPL-SCNC: 134 MMOL/L (ref 136–145)
SODIUM SERPL-SCNC: 135 MMOL/L (ref 136–145)
SPECIMEN OUTDATE: NORMAL
WBC # BLD AUTO: 6.26 K/UL (ref 3.9–12.7)

## 2023-11-30 PROCEDURE — 25000003 PHARM REV CODE 250: Mod: NTX | Performed by: STUDENT IN AN ORGANIZED HEALTH CARE EDUCATION/TRAINING PROGRAM

## 2023-11-30 PROCEDURE — 25000003 PHARM REV CODE 250: Mod: NTX | Performed by: PHYSICIAN ASSISTANT

## 2023-11-30 PROCEDURE — 20000000 HC ICU ROOM: Mod: NTX

## 2023-11-30 PROCEDURE — 85025 COMPLETE CBC W/AUTO DIFF WBC: CPT | Mod: NTX | Performed by: STUDENT IN AN ORGANIZED HEALTH CARE EDUCATION/TRAINING PROGRAM

## 2023-11-30 PROCEDURE — 25000003 PHARM REV CODE 250: Mod: NTX | Performed by: INTERNAL MEDICINE

## 2023-11-30 PROCEDURE — 99232 SBSQ HOSP IP/OBS MODERATE 35: CPT | Mod: NTX,,, | Performed by: NURSE PRACTITIONER

## 2023-11-30 PROCEDURE — 83735 ASSAY OF MAGNESIUM: CPT | Mod: NTX | Performed by: STUDENT IN AN ORGANIZED HEALTH CARE EDUCATION/TRAINING PROGRAM

## 2023-11-30 PROCEDURE — 85730 THROMBOPLASTIN TIME PARTIAL: CPT | Mod: NTX | Performed by: STUDENT IN AN ORGANIZED HEALTH CARE EDUCATION/TRAINING PROGRAM

## 2023-11-30 PROCEDURE — 87635 SARS-COV-2 COVID-19 AMP PRB: CPT | Performed by: INTERNAL MEDICINE

## 2023-11-30 PROCEDURE — 27000221 HC OXYGEN, UP TO 24 HOURS: Mod: NTX

## 2023-11-30 PROCEDURE — 99291 CRITICAL CARE FIRST HOUR: CPT | Mod: NTX,,, | Performed by: INTERNAL MEDICINE

## 2023-11-30 PROCEDURE — 25000003 PHARM REV CODE 250: Mod: NTX

## 2023-11-30 PROCEDURE — 97110 THERAPEUTIC EXERCISES: CPT | Mod: NTX

## 2023-11-30 PROCEDURE — 80053 COMPREHEN METABOLIC PANEL: CPT | Mod: NTX | Performed by: STUDENT IN AN ORGANIZED HEALTH CARE EDUCATION/TRAINING PROGRAM

## 2023-11-30 PROCEDURE — 63600175 PHARM REV CODE 636 W HCPCS: Mod: NTX | Performed by: STUDENT IN AN ORGANIZED HEALTH CARE EDUCATION/TRAINING PROGRAM

## 2023-11-30 PROCEDURE — 85610 PROTHROMBIN TIME: CPT | Mod: NTX

## 2023-11-30 PROCEDURE — 82803 BLOOD GASES ANY COMBINATION: CPT | Mod: NTX

## 2023-11-30 PROCEDURE — 94761 N-INVAS EAR/PLS OXIMETRY MLT: CPT | Mod: NTX

## 2023-11-30 PROCEDURE — 27000202 HC IABP, ADD'L DAY: Mod: NTX

## 2023-11-30 PROCEDURE — 80048 BASIC METABOLIC PNL TOTAL CA: CPT | Mod: NTX,XB | Performed by: REGISTERED NURSE

## 2023-11-30 PROCEDURE — 86920 COMPATIBILITY TEST SPIN: CPT

## 2023-11-30 PROCEDURE — 99900035 HC TECH TIME PER 15 MIN (STAT): Mod: NTX

## 2023-11-30 PROCEDURE — 86850 RBC ANTIBODY SCREEN: CPT | Mod: NTX

## 2023-11-30 PROCEDURE — 25000003 PHARM REV CODE 250: Mod: NTX | Performed by: REGISTERED NURSE

## 2023-11-30 PROCEDURE — 63600175 PHARM REV CODE 636 W HCPCS: Mod: NTX

## 2023-11-30 RX ORDER — SYRING-NEEDL,DISP,INSUL,0.3 ML 29 G X1/2"
148 SYRINGE, EMPTY DISPOSABLE MISCELLANEOUS ONCE
Status: COMPLETED | OUTPATIENT
Start: 2023-11-30 | End: 2023-11-30

## 2023-11-30 RX ORDER — HYDROCODONE BITARTRATE AND ACETAMINOPHEN 500; 5 MG/1; MG/1
TABLET ORAL
Status: DISCONTINUED | OUTPATIENT
Start: 2023-11-30 | End: 2023-12-01

## 2023-11-30 RX ORDER — SODIUM CHLORIDE 9 MG/ML
INJECTION, SOLUTION INTRAVENOUS CONTINUOUS
Status: ACTIVE | OUTPATIENT
Start: 2023-11-30 | End: 2023-11-30

## 2023-11-30 RX ORDER — INSULIN ASPART 100 [IU]/ML
10 INJECTION, SOLUTION INTRAVENOUS; SUBCUTANEOUS
Status: DISCONTINUED | OUTPATIENT
Start: 2023-11-30 | End: 2023-12-01

## 2023-11-30 RX ORDER — MUPIROCIN 20 MG/G
1 OINTMENT TOPICAL 2 TIMES DAILY
Status: DISPENSED | OUTPATIENT
Start: 2023-11-30 | End: 2023-12-01

## 2023-11-30 RX ORDER — MUPIROCIN 20 MG/G
OINTMENT TOPICAL
Status: DISCONTINUED | OUTPATIENT
Start: 2023-11-30 | End: 2023-12-06

## 2023-11-30 RX ADMIN — INSULIN ASPART 4 UNITS: 100 INJECTION, SOLUTION INTRAVENOUS; SUBCUTANEOUS at 08:11

## 2023-11-30 RX ADMIN — PHYTONADIONE 10 MG: 10 INJECTION, EMULSION INTRAMUSCULAR; INTRAVENOUS; SUBCUTANEOUS at 05:11

## 2023-11-30 RX ADMIN — AMIODARONE HYDROCHLORIDE 400 MG: 200 TABLET ORAL at 08:11

## 2023-11-30 RX ADMIN — ATORVASTATIN CALCIUM 40 MG: 40 TABLET, FILM COATED ORAL at 08:11

## 2023-11-30 RX ADMIN — Medication 400 MG: at 08:11

## 2023-11-30 RX ADMIN — OXYCODONE HYDROCHLORIDE 5 MG: 5 TABLET ORAL at 08:11

## 2023-11-30 RX ADMIN — ASPIRIN 81 MG: 81 TABLET, COATED ORAL at 08:11

## 2023-11-30 RX ADMIN — SENNOSIDES 8.6 MG: 8.6 TABLET, FILM COATED ORAL at 08:11

## 2023-11-30 RX ADMIN — INSULIN ASPART 6 UNITS: 100 INJECTION, SOLUTION INTRAVENOUS; SUBCUTANEOUS at 08:11

## 2023-11-30 RX ADMIN — PHYTONADIONE 10 MG: 10 INJECTION, EMULSION INTRAMUSCULAR; INTRAVENOUS; SUBCUTANEOUS at 11:11

## 2023-11-30 RX ADMIN — BE HEALTH MAGNESIUM CITRATE ORAL SOLUTION - LEMON 148 ML: 1.75 LIQUID ORAL at 08:11

## 2023-11-30 RX ADMIN — GABAPENTIN 300 MG: 300 CAPSULE ORAL at 08:11

## 2023-11-30 RX ADMIN — THERA TABS 1 TABLET: TAB at 08:11

## 2023-11-30 RX ADMIN — INSULIN ASPART 12 UNITS: 100 INJECTION, SOLUTION INTRAVENOUS; SUBCUTANEOUS at 08:11

## 2023-11-30 RX ADMIN — DOBUTAMINE IN DEXTROSE 5 MCG/KG/MIN: 400 INJECTION, SOLUTION INTRAVENOUS at 06:11

## 2023-11-30 RX ADMIN — HEPARIN SODIUM AND DEXTROSE 16 UNITS/KG/HR: 10000; 5 INJECTION INTRAVENOUS at 01:11

## 2023-11-30 RX ADMIN — SODIUM CHLORIDE: 9 INJECTION, SOLUTION INTRAVENOUS at 09:11

## 2023-11-30 RX ADMIN — POLYETHYLENE GLYCOL 3350 17 G: 17 POWDER, FOR SOLUTION ORAL at 08:11

## 2023-11-30 RX ADMIN — ACETAMINOPHEN 650 MG: 325 TABLET ORAL at 03:11

## 2023-11-30 RX ADMIN — PANTOPRAZOLE SODIUM 40 MG: 40 TABLET, DELAYED RELEASE ORAL at 06:11

## 2023-11-30 RX ADMIN — MUPIROCIN 1 G: 20 OINTMENT TOPICAL at 10:11

## 2023-11-30 NOTE — PROGRESS NOTES
Pt currently admitted in the hospital.  I met with him at the bedside to discuss participation in the [facility] registry, as pt is scheduled to receive an LVAD on 12/1/2023. Pt verbalized consent and willingness to participate with the [employer] registry.      Patient completed the KCCQ, EuroQol EQ-5D, and the Trail [employer] neurocognitive test in 169 seconds.

## 2023-11-30 NOTE — ANESTHESIA PREPROCEDURE EVALUATION
[facility]  Anesthesia Pre-Operative Evaluation        Patient Name: Radha Abbott  YOB: 1960  MRN: [id number]    SUBJECTIVE:     Pre-operative Evaluation for Procedure(s) (LRB):  INSERTION-LEFT VENTRICULAR ASSIST DEVICE (Left)     11/30/2023    Radha Abbott is a 63 y.o. male with a PMHx significant for CAD s/p 3V CABG in 2009 now with EF 10-15% with ICD in place, normal RV function, moderate MR, PASP 41 on home , admitted for exacerbation of heart failure 11.10. Other co morbidities include uncontrolled DM2, HTN, HLD, hx of vfib taking home amio, CKD (baseline Cr ~2.5).    Admitted currently in the ICU on  5 with IABP being diuresed.     Access includes: x2 20g PIV, RIJ triple lumen        Cardiac Studies  Interpretation Summary    Left Ventricle: The left ventricle is moderately dilated. Mildly increased ventricular mass. Normal wall thickness. There is eccentric hypertrophy. Severe global hyperkinesis present. There is severely reduced systolic function with a visually estimated ejection fraction of 10 -15%. Grade III diastolic dysfunction.    Right Ventricle: Normal right ventricular cavity size. Systolic function is normal. Pacemaker lead present in the ventricle.    Left Atrium: Left atrium is severely dilated.    Mitral Valve: There is annular and bileaflet tethering. There is moderate regurgitation with a centrally directed jet.    Tricuspid Valve: There is mild regurgitation with a centrally directed jet.    Pulmonary Artery: The estimated pulmonary artery systolic pressure is 41 mmHg.    IVC/SVC: Normal venous pressure at 3 mmHg.      Review of patient's allergies indicates:  No Known Allergies    Current Outpatient Medications   Medication Instructions    amiodarone (PACERONE) 400 mg, Oral, Daily    aspirin (ECOTRIN) 81 mg, Oral, Daily    atorvastatin (LIPITOR) 40 mg, Oral    DOBUTamine (DOBUTREX) 1,000 mg/250 mL (4,000 mcg/mL) infusion 5 mcg/kg/min, Intravenous,  Continuous    ELIQUIS 5 mg, Oral, 2 times daily    fish oil-omega-3 fatty acids 300-1,000 mg capsule 2 g, Oral, Daily    furosemide (LASIX) 40 mg, Oral, 2 times daily    gabapentin (NEURONTIN) 300 mg, Oral, Nightly PRN    metOLazone (ZAROXOLYN) 2.5 mg, Oral, Daily, Thursday 11/9 and Saturday 11/10    multivitamin capsule 1 capsule, Oral, Daily    pantoprazole (PROTONIX) 40 mg, Oral, Before breakfast    potassium chloride SA (K-DUR,KLOR-CON) 20 MEQ tablet 20 mEq, Oral, Daily    sacubitriL-valsartan (ENTRESTO) 24-26 mg per tablet 1 tablet, Oral, 2 times daily    torsemide (DEMADEX) 40 mg, Oral, 2 times daily         Social History     Substance and Sexual Activity   Drug Use No     Alcohol Use: Not At Risk (10/27/2023)    AUDIT-C     Frequency of Alcohol Consumption: Never     Average Number of Drinks: Patient does not drink     Frequency of Binge Drinking: Never     Tobacco Use: High Risk (11/22/2023)    Patient History     Smoking Tobacco Use: Every Day     Smokeless Tobacco Use: Unknown     Passive Exposure: Not on file       OBJECTIVE:     Vital Signs Range (Last 24H):  Temp:  [36.7 °C (98 °F)-37.1 °C (98.7 °F)]   Pulse:  [82-96]   Resp:  [10-22]   BP: (100-131)/(54-66)   SpO2:  [96 %-100 %]       Significant Labs    Heme Profile  Lab Results   Component Value Date    WBC 6.26 11/30/2023    HGB 8.4 (L) 11/30/2023    HCT 26.2 (L) 11/30/2023     11/30/2023       Coagulation Studies  Lab Results   Component Value Date    LABPROT 11.4 11/30/2023    INR 1.1 11/30/2023    APTT 48.8 (H) 11/30/2023    APTT 48.8 (H) 11/30/2023       BMP  Lab Results   Component Value Date     (L) 11/30/2023    K 4.4 11/30/2023    CO2 24 11/30/2023    BUN 60 (H) 11/30/2023    CREATININE 2.1 (H) 11/30/2023    MG 2.5 11/30/2023    PHOS 4.1 11/28/2023       Endocrine Profile  Lab Results   Component Value Date    HGBA1C 7.6 (H) 10/12/2023    TSH 1.381 11/15/2023       ASSESSMENT/PLAN:       Pre-op Assessment    I have reviewed the  Patient Summary Reports.     I have reviewed the Nursing Notes.    I have reviewed the Medications.     Review of Systems  Anesthesia Hx:  No problems with previous Anesthesia             Denies Family Hx of Anesthesia complications.    Denies Personal Hx of Anesthesia complications.                    Social:  Smoker       Hematology/Oncology:  Hematology Normal   Oncology Normal                                   EENT/Dental:  EENT/Dental Normal           Cardiovascular:    Pacemaker   Past MI CAD   CABG/stent    CHF                                 Pulmonary:  Pulmonary Normal   Denies COPD.      Denies Sleep Apnea.                Renal/:  Chronic Renal Disease, ARF                Hepatic/GI:  Hepatic/GI Normal     Denies GERD.             Musculoskeletal:  Musculoskeletal Normal                Neurological:  Neurology Normal   Denies CVA.                                    Endocrine:  Diabetes           Dermatological:  Skin Normal    Psych:  Psychiatric Normal                    Physical Exam  General: Well nourished, Cooperative, Alert and Oriented    Airway:  Mallampati: IV / III  Mouth Opening: Normal  TM Distance: Normal  Tongue: Normal  Neck ROM: Normal ROM    Dental:  Dentures  Top dentures. Bottom teeth intact.      Anesthesia Plan  Type of Anesthesia, risks & benefits discussed:    Anesthesia Type: Gen ETT  Intra-op Monitoring Plan: Standard ASA Monitors, Art Line, Central Line, MOY, PA and CO  Post Op Pain Control Plan: multimodal analgesia and IV/PO Opioids PRN  Induction:  IV  Airway Plan: Direct, Post-Induction  Informed Consent: Informed consent signed with the Patient and all parties understand the risks and agree with anesthesia plan.  All questions answered.   ASA Score: 4  Day of Surgery Review of History & Physical: H&P Update referred to the surgeon/provider.    Ready For Surgery From Anesthesia Perspective.     .

## 2023-11-30 NOTE — PROGRESS NOTES
[facility] - Cardiac Intensive Care  Endocrinology  Progress Note    Admit Date: 2023     Reason for Consult: Management of T2DM, Hyperglycemia     Surgical Procedure and Date: n/a    Diabetes diagnosis year:     Home Diabetes Medications:  Metformin (off since October)   Lab Results   Component Value Date    HGBA1C 7.6 (H) 10/12/2023       How often checking glucose at home?  Once daily in the AM    BG readings on regimen: 150-160s  Hypoglycemia on the regimen?  No  Missed doses on regimen?  n/a    Diabetes Complications include:     Hyperglycemia    Complicating diabetes co morbidities:   CAD s/p CABG, HTN, HLD      HPI:   Patient is a 63 y.o. male with a diagnosis of CAD s/p 3v CABG (unclear anatomy, ), ICM with a recent EF of 15-20% s/p ICD (medtronik ), DM2 (a1c 7.7), HTN, HLD, Vfib on amio who presents to the ED with CC of SOB. In the ED he was AF with stable VS on RA.  CBC showing chronic anemia.  CMP notable for acute on chronic CKD with baseline around 2.1 and Cr now 2.6.  BNP elevated.  Lactate neg.  CXR showing pulmonary edema. He was subsequently admitted to the CCU for diuresis.  Endocrinology consulted for management of T2DM.          Interval HPI:   Overnight events: No acute events overnight. Patient in room [id number]/[id number] A. Blood glucose stable. BG at and below goal on current insulin regimen (SSI, prandial, and basal insulin ). Steroid use- None. 9 Days Post-Op  Renal function- Abnormal - Creatinine 2.1   Vasopressors-  None       Endocrine will continue to follow and manage insulin orders inpatient.         Diet diabetic 2800 Calorie; Fluid - 2000mL; Standard Tray  Diet NPO Except for: Medication     Eatin%  Nausea: No  Hypoglycemia and intervention: No  Fever: No  TPN and/or TF: No      /64 (BP Location: Right arm, Patient Position: Lying)   Pulse 93   Temp 98 °F (36.7 °C) (Oral)   Resp 17   Ht 6' (1.829 m)   Wt 77.5 kg (170 lb 13.7 oz)   SpO2 100%   BMI  "23.17 kg/m²     Labs Reviewed and Include    Recent Labs   Lab 11/30/23  0413   GLU 97   CALCIUM 9.4   ALBUMIN 2.9*   PROT 7.2   *   K 4.4   CO2 24      BUN 60*   CREATININE 2.1*   ALKPHOS 78   ALT 23   AST 30   BILITOT 0.5     Lab Results   Component Value Date    WBC 6.26 11/30/2023    HGB 8.4 (L) 11/30/2023    HCT 26.2 (L) 11/30/2023    MCV 88 11/30/2023     11/30/2023     No results for input(s): "TSH", "FREET4" in the last 168 hours.  Lab Results   Component Value Date    HGBA1C 7.6 (H) 10/12/2023       Nutritional status:   Body mass index is 23.17 kg/m².  Lab Results   Component Value Date    ALBUMIN 2.9 (L) 11/30/2023    ALBUMIN 2.9 (L) 11/29/2023    ALBUMIN 2.9 (L) 11/28/2023     Lab Results   Component Value Date    PREALBUMIN 27 11/15/2023       Estimated Creatinine Clearance: 40.5 mL/min (A) (based on SCr of 2.1 mg/dL (H)).    Accu-Checks  Recent Labs     11/28/23  0821 11/28/23  1232 11/28/23  1748 11/28/23  2133 11/29/23  0359 11/29/23  0811 11/29/23  1233 11/29/23  1625 11/29/23  1957 11/29/23 2002   POCTGLUCOSE 178* 167* 168* 152* 141* 198* 142* 122* 93 83       Current Medications and/or Treatments Impacting Glycemic Control  Immunotherapy:    Immunosuppressants       None          Steroids:   Hormones (From admission, onward)      None          Pressors:    Autonomic Drugs (From admission, onward)      None          Hyperglycemia/Diabetes Medications:   Antihyperglycemics (From admission, onward)      Start     Stop Route Frequency Ordered    11/30/23 2100  insulin detemir U-100 (Levemir) pen 16 Units         -- SubQ Nightly 11/30/23 0856    11/30/23 1130  insulin aspart U-100 pen 10 Units         -- SubQ 3 times daily with meals 11/30/23 0856    11/20/23 1747  insulin aspart U-100 pen 0-10 Units         -- SubQ Before meals & nightly PRN 11/20/23 1648            ASSESSMENT and PLAN    Cardiac/Vascular  * Acute on chronic combined systolic and diastolic CHF, NYHA class " 4  Managed per primary team  Optimize BG control    PAF (paroxysmal atrial fibrillation)  May increase insulin resistance.         Renal/  IVÁN (acute kidney injury)  Lab Results   Component Value Date    CREATININE 2.1 (H) 11/30/2023     Avoid insulin stacking  Titrate insulin slowly       Endocrine  Type 2 diabetes mellitus without complication, without long-term current use of insulin  BG goal 140-180    WBD 0.6 units/kg/day (60/40 split)  - Increase Levemir (Insulin Detemir) to 16 units nightly  (20% reduction due to fasting blood glucose below goal.)    - Continue Novolog (Insulin Aspart) 10 units TIDWM and prn for BG excursions MDC SSI (150/25) (20% reduction due to prandial blood glucose below goal)   - BG checks AC/HS  - Hypoglycemia protocol in place    ** Please notify Endocrine for any change and/or advance in diet**  ** Please call Endocrine for any BG related issues **    Discharge Planning:   TBD. Please notify endocrinology prior to discharge.                 Erasmo Parrish, DNP, FNP  Endocrinology  [facility] - Cardiac Intensive Care

## 2023-11-30 NOTE — ASSESSMENT & PLAN NOTE
Lab Results   Component Value Date    CREATININE 2.1 (H) 11/30/2023     Avoid insulin stacking  Titrate insulin slowly

## 2023-11-30 NOTE — PROGRESS NOTES
Update:  CRISTAL met with pt and his wife this morning.  Discussed arrangements for [facility] room by LVAD team.  Explained check in process and needing credit card.  Wife voiced understanding and agreement to same.  Wife also having FMLA paperwork sent from her work.  SW provided email and fax number. SW will assist wife with bringing this paperwork to the Disability Desk for completion.  No other needs indicated at this time.

## 2023-11-30 NOTE — ASSESSMENT & PLAN NOTE
Pt with known ICM with an EF of 25% on home  presenting with decompensated HF.  Not in cardiogenic shock    RHC 11/14: RA 14, PA 70/35, PCWP 32, CO 4.1, CI 2.1.   Repeat RHC 11/20 RA RA  20  PA 60/29 (39)  PCWP 29    CO  4.6 l/min  CI  2.3 l/min/m2  2D echo 11/21 showed EF 15-20%, mild RV dysfunction, mild MR, LVEDD 6.9     Repeat on 11/24 showed EF 10-15%, RV mildly reduced, mild-mod MR, LVEDD 6.42  - Home  gtt at 5  - home GDMT: entresto 24/26mg BID (on hold 2/2 IVÁN), Hydralazine 25 q8h being continued  - home diuretics: Was taking lasix 40 bid  - s/p IABP placement 11/21. Augmenting 100s at 1:1  - Continue VAD/OHTx workup. (Awaiting OR date)   -Blood type A+  -PT/OT and nutrition   -Continue current support with  5,  IABP 1:1  -holding Lasix and giving 250cc IVFs  -Monitor strict I/Os  -Plans for LVAD placement tomorrow

## 2023-11-30 NOTE — PT/OT/SLP PROGRESS
Physical Therapy Treatment    Patient Name:  Radha Abbott   MRN:  [id number]  Admitting Diagnosis:  Acute on chronic combined systolic and diastolic CHF, NYHA class 4   Recent Surgery: Procedure(s) (LRB):  INSERTION, INTRA-AORTIC BALLOON PUMP (Right) 9 Days Post-Op  Admit Date: 11/9/2023  Length of Stay: 21 days    Recommendations:     Discharge Recommendations:    No Therapy Indicated  Discharge Equipment Recommendations: other (see comments) (continue to assess once off bedrest orders)   Barriers to discharge: None    Plan:     During this hospitalization, patient to be seen 1 x/week to address the identified rehab impairments via gait training, therapeutic activities, therapeutic exercises, neuromuscular re-education and progress towards the established goals.  Plan of Care Expires:  12/15/23  Plan of Care Reviewed with: patient, family    Assessment:     Radha Abbott is a 63 y.o. male admitted with a medical diagnosis of Acute on chronic combined systolic and diastolic CHF, NYHA class 4. Pt agreeable to therapy session today. Patient participated in UE therapeutic exercises with green theraband. Pt required visual demonstration and VC/TC to complete exercises. Progress mobility as medically appropriate. Patient would benefit from skilled therapy services to maximize safety and independence, increase activity tolerance, decrease fall risk, decrease caregiver burden, improve QOL, improve patient's functional mobility, and decrease risk of contractures and pressure sores.    Problem List: weakness, impaired functional mobility, decreased safety awareness, decreased upper extremity function, decreased lower extremity function, impaired cardiopulmonary response to activity.  Rehab Prognosis: Good; patient would benefit from acute skilled PT services to address these deficits and reach maximum level of function.      Goals:   Multidisciplinary Problems       Physical Therapy Goals          Problem: Physical Therapy     "Goal Priority Disciplines Outcome Goal Variances Interventions   Physical Therapy Goal     PT, PT/OT Ongoing, Progressing     Description: Goals to be met by: 12/15/23     Patient will increase functional independence with mobility by performin. Sit to stand transfer with [place]  2. Gait  x 400 feet with [place] using LRAD.   3. Lower extremity exercise program x30 reps per handout, with independence                   Multidisciplinary Problems (Resolved)          Problem: Physical Therapy    Goal Priority Disciplines Outcome Goal Variances Interventions   Physical Therapy Goal   (Resolved)     PT, PT/OT Met     Description: Goals to be met by: 23     Patient will increase functional independence with mobility by performin. Evaluate pt's need for physical therapy.                          Subjective     RN notified prior to session. Family present upon PT entrance into room. Patient agreeable to PT treatment session.    Chief Complaint: "No one taught me how to do the exercises in the bed they only taught me when I was in the chair"  Patient/Family Comments/goals: increase mobility; LVAD surgery  Pain/Comfort:  Pain Rating 1: 0/10  Pain Rating Post-Intervention 1: 0/10      Objective:       Patient found supine with: blood pressure cuff, pulse ox (continuous), telemetry, IABP, PICC line, peripheral IV, Other (comments) (nitric oxide)   Cognition:   Alert and Cooperative  Patient is oriented to Person, Place, Time, Situation  General Precautions: Standard, Cardiac fall, IABP   Orthopedic Precautions:N/A   Braces: N/A   Body mass index is 23.17 kg/m².  Oxygen Device: Nitric Oxide  Vitals: BP (!) 109/58 (BP Location: Right arm, Patient Position: Lying)   Pulse 96   Temp 98.1 °F (36.7 °C) (Oral)   Resp (!) 21   Ht 6' (1.829 m)   Wt 77.5 kg (170 lb 13.7 oz)   SpO2 98%   BMI 23.17 kg/m²     Outcome Measures:  AM-PAC 6 CLICK MOBILITY  Turning over in bed (including adjusting " bedclothes, sheets and blankets)?: 1  Sitting down on and standing up from a chair with arms (e.g., wheelchair, bedside commode, etc.): 1  Moving from lying on back to sitting on the side of the bed?: 1  Moving to and from a bed to a chair (including a wheelchair)?: 1  Need to walk in hospital room?: 1  Climbing 3-5 steps with a railing?: 1  Basic Mobility Total Score: 6     Functional Mobility:    Bed Mobility:   Not performed 2/2 pt safety as pt with femoral IABP    Transfers:   Not performed 2/2 pt safety as pt with femoral IABP      Gait:  Not performed 2/2 pt safety as pt with femoral IABP    Therapeutic Exercises:   Patient performed 1 set(s) of 10 repetitions of  the following bed level exercises with green theraband: shoulder flexion, sholder abduction, elbow flexion, tricep extension, shoulder external rotation, shoulder lat pull down for bilateral UE. Patient required skilled PT for instruction of exercises and appropriate cues to perform exercises safely and appropriately.    Education:  Time provided for education, counseling and discussion of health disposition in regards to patient's current status  All questions answered within PT scope of practice and to patient's satisfaction  PT role in POC to address current functional deficits  Pt educated on Post-op sternal precautions, including no lifting > 5 lbs, pulling or pushing with BUEs. Able to move arms within a pain free range.    Patient left supine with all lines intact, call button in reach, and RN and family present.    Time Tracking:     PT Received On: 11/30/23  PT Start Time: 1124     PT Stop Time: 1137  PT Total Time (min): 13 min     Billable Minutes:   Therapeutic Exercise 13 minutes    Treatment Type: Treatment  PT/PTA: PT       11/30/2023

## 2023-11-30 NOTE — PLAN OF CARE
HTS Care Update Note     CVP: 1  SVO2: 61  CI: 3.37  CO: 6.1  SVR: 939    UO: 960,net +288    Infusions: hep gtt,  5    Mechanical support: IABP 1:1    Diuretics:  on hold    Plan:  No changes made, continue current plan of care  Plan discussed with attending     Omid Manzanares MD  Cardiovascular Disease PGY-V  [facility]

## 2023-11-30 NOTE — PLAN OF CARE
Cardiac ICU Care Plan    KAREN   CVP 1, 1,1  SvO2 63     POC reviewed with Radha Abbott and family. Questions and concerns addressed. No acute events today. Pt progressing toward goals. Will continue to monitor. See below and flowsheets for full assessment and VS info.       Neuro:  [place] Coma Scale  Best Eye Response: 4-->(E4) spontaneous  Best Motor Response: 6-->(M6) obeys commands  Best Verbal Response: 5-->(V5) oriented  Michelle Coma Scale Score: 15  Assessment Qualifiers: patient not sedated/intubated  Pupil PERRLA: no    24 hr Temp:  [98.1 °F (36.7 °C)-98.7 °F (37.1 °C)]      CV:  Rhythm: normal sinus rhythm   DVT prophylaxis: VTE Required Core Measure: Pharmacological prophylaxis initiated/maintained    CVP (mean): 1 mmHg (11/30/23 0301)    [REMOVED] Pulmonary Artery Catheter Assessment  11/20/23 1330 Internal Jugular Left-Current Insertion Depth (cm): 63.5 cm (11/29/23 0700)  PAP: 62/28 (11/29/23 1100)  SVO2 (%): 54 % (11/28/23 1901)    IABP Mode: Semi-auto  IABP Rate: 1:1  IABP Trigger: ECG  IABP Augmented Diastolic Pressure: 100          Pulses  Right Radial Pulse: 2+ (normal)  Left Radial Pulse: 2+ (normal)  Right Dorsalis Pedis Pulse: 1+ (weak)  Left Dorsalis Pedis Pulse: 1+ (weak)  Right Posterior Tibial Pulse: 1+ (weak)  Left Posterior Tibial Pulse: 1+ (weak)    Resp:  Flow (L/min): 4  Oxygen Concentration (%): 36    GI/:  GI prophylaxis: yes  Diet/Nutrition Received: consistent carb/diabetic diet, restrict fluids (2L FR)  Last Bowel Movement: 11/27/23  Voiding Characteristics: external catheter, voids spontaneously without difficulty   Intake/Output Summary (Last 24 hours) at 11/30/2023 0427  Last data filed at 11/30/2023 0401  Gross per 24 hour   Intake 1446.93 ml   Output 1575 ml   Net -128.07 ml        Nutritional Supplement Intake: Quantity 0, Type: Boost    Labs/Accuchecks:  Recent Labs   Lab 11/27/23  0211 11/27/23  0837 11/28/23  0201 11/29/23  0400   WBC 7.39  --  6.78 6.82   RBC 3.03*  --  " 3.08* 3.12*   HGB 8.5*  --  8.6* 8.7*   HCT 26.4* 28* 26.7* 27.4*     --  284 297      Recent Labs   Lab 11/29/23  0400 11/29/23  1626 11/29/23 2242   APTT 53.7* 32.3* 54.3*      Recent Labs     11/29/23  0400 11/29/23  1834 11/29/23  2242   *   < > 134*   K 4.4   < > 4.6   CO2 25   < > 22*   CL 99   < > 98   BUN 64*   < > 61*   CREATININE 2.4*   < > 2.3*   ALKPHOS 79  --   --    ALT 22  --   --    AST 26  --   --    BILITOT 0.5  --   --     < > = values in this interval not displayed.     No results for input(s): "CPK", "CPKMB", "MB", "TROPONINI" in the last 168 hours.   Recent Labs     11/28/23  1939 11/29/23  0811 11/29/23 1958   PH 7.424 7.431 7.437   PCO2 40.6 39.0 40.2   PO2 28* 29* 30*   HCO3 26.6 25.9 27.1   POCSATURATED 54 57 61   BE 2 2 3*       Electrolytes: No replacement orders  Accuchecks: ACHS    Gtts/LDAs:   sodium chloride 0.9%      sodium chloride 0.9% 5 mL/hr at 11/30/23 0401    DOBUTamine 5 mcg/kg/min (11/30/23 0401)    heparin (porcine) in D5W 16 Units/kg/hr (11/30/23 0401)    sodium chloride 0.9% 250 mL/hr at 11/20/23 2002       Lines/Drains/Airways       Central Venous Catheter Line  Duration             Percutaneous Central Line Insertion/Assessment - Triple Lumen  11/29/23 1543 Internal Jugular Right <1 day              Drain  Duration             Male External Urinary Catheter 11/26/23 0628 3 days              Line  Duration                  IABP 11/21/23 1048 7.5 Fr. 40 mL 8 days              Peripheral Intravenous Line  Duration                  Peripheral IV - Single Lumen 11/29/23 1100 20 G Anterior;Left Forearm <1 day         Peripheral IV - Single Lumen 11/29/23 1100 20 G Anterior;Left Forearm <1 day                    Skin/Wounds  Bathing/Skin Care: bath, complete;back care;dressed/undressed;linen changed;electrode patches/site rotation (11/29/23 1405)  Wounds: No  Wound care consulted: No     Problem: Adult Inpatient Plan of Care  Goal: Plan of Care Review  Outcome: " Ongoing, Progressing

## 2023-11-30 NOTE — PROGRESS NOTES
[facility] - Cardiac Intensive Care  Heart Transplant  Progress Note    Patient Name: Radha Abbott  MRN: [id number]  Admission Date: 11/9/2023  Hospital Length of Stay: 21 days  Attending Physician: Brayan Ocampo MD  Primary Care Provider: Vasu Kong MD  Principal Problem:Acute on chronic combined systolic and diastolic CHF, NYHA class 4    Subjective:   Interval History: No acute events overnight. Reports some constipation again today so given Mag Citrate. Renal function continues to improved while holding diuresis. CVP remains low today so giving 250cc IVFs. Remains on support with IABP @ 1:1 and  5. Awaiting LVAD implant scheduled for tomorrow.       Continuous Infusions:   sodium chloride 0.9%      sodium chloride 0.9% 5 mL/hr at 11/30/23 0900    sodium chloride 0.9% 50 mL/hr at 11/30/23 0901    DOBUTamine 5 mcg/kg/min (11/30/23 0900)    heparin (porcine) in D5W 16 Units/kg/hr (11/30/23 0900)    sodium chloride 0.9% 250 mL/hr at 11/20/23 2002     Scheduled Meds:   amiodarone  400 mg Oral Daily    aspirin  81 mg Oral Daily    atorvastatin  40 mg Oral Daily    insulin aspart U-100  10 Units Subcutaneous TIDWM    insulin detemir U-100  16 Units Subcutaneous QHS    lactulose  15 g Oral Once    magnesium oxide  400 mg Oral Daily    multivitamin  1 tablet Oral Daily    pantoprazole  40 mg Oral Before breakfast    polyethylene glycol  17 g Oral Daily    senna  8.6 mg Oral Daily     PRN Meds:0.9%  NaCl infusion (for blood administration), sodium chloride 0.9%, acetaminophen, baclofen, bisacodyL, dextrose 10%, dextrose 10%, gabapentin, glucagon (human recombinant), glucose, glucose, heparin (PORCINE), heparin (PORCINE), insulin aspart U-100, ondansetron, oxyCODONE, senna-docusate 8.6-50 mg, sodium chloride 0.9%, sodium chloride 0.9%    Review of patient's allergies indicates:  No Known Allergies  Objective:     Vital Signs (Most Recent):  Temp: 98 °F (36.7 °C) (11/30/23 0700)  Pulse: 93 (11/30/23 0900)  Resp:  17 (11/30/23 0900)  BP: 113/64 (11/30/23 0900)  SpO2: 100 % (11/30/23 0900) Vital Signs (24h Range):  Temp:  [98 °F (36.7 °C)-98.7 °F (37.1 °C)] 98 °F (36.7 °C)  Pulse:  [82-93] 93  Resp:  [10-22] 17  SpO2:  [96 %-100 %] 100 %  BP: (100-155)/(54-75) 113/64     Patient Vitals for the past 72 hrs (Last 3 readings):   Weight   11/30/23 0601 77.5 kg (170 lb 13.7 oz)   11/30/23 0400 78.4 kg (172 lb 14.4 oz)   11/29/23 0707 78.5 kg (173 lb)       Body mass index is 23.17 kg/m².      Intake/Output Summary (Last 24 hours) at 11/30/2023 0925  Last data filed at 11/30/2023 0900  Gross per 24 hour   Intake 1355.67 ml   Output 1275 ml   Net 80.67 ml         Hemodynamic Parameters:  PAP: (55-62)/(20-28) 62/28  PAP (Mean):  [35 mmHg-43 mmHg] 43 mmHg    Telemetry: NSR        Physical Exam  Constitutional:       Appearance: Normal appearance. He is ill-appearing.   HENT:      Head: Normocephalic.   Cardiovascular:      Rate and Rhythm: Normal rate and regular rhythm.      Pulses: Normal pulses.      Comments: IABP   Pulmonary:      Effort: Pulmonary effort is normal.      Breath sounds: Normal breath sounds.   Abdominal:      General: Bowel sounds are normal.      Palpations: Abdomen is soft.   Musculoskeletal:         General: Normal range of motion.      Cervical back: Neck supple.   Skin:     General: Skin is warm and dry.   Neurological:      General: No focal deficit present.      Mental Status: He is alert and oriented to person, place, and time.            Significant Labs:  CBC:  Recent Labs   Lab 11/28/23  0201 11/29/23  0400 11/30/23  0413   WBC 6.78 6.82 6.26   RBC 3.08* 3.12* 2.98*   HGB 8.6* 8.7* 8.4*   HCT 26.7* 27.4* 26.2*    297 281   MCV 87 88 88   MCH 27.9 27.9 28.2   MCHC 32.2 31.8* 32.1       BNP:  Recent Labs   Lab 11/29/23  1834   *     CMP:  Recent Labs   Lab 11/28/23  0201 11/28/23  1456 11/29/23  0400 11/29/23  1834 11/29/23  2242 11/30/23  0413   *   < > 133* 145* 87 97   CALCIUM 9.1    "< > 9.3 9.3 9.5 9.4   ALBUMIN 2.9*  --  2.9*  --   --  2.9*   PROT 7.1  --  7.3  --   --  7.2   *   < > 134* 136 134* 135*   K 4.2   < > 4.4 4.4 4.6 4.4   CO2 24   < > 25 25 22* 24   CL 98   < > 99 100 98 100   BUN 74*   < > 64* 65* 61* 60*   CREATININE 2.2*   < > 2.4* 2.1* 2.3* 2.1*   ALKPHOS 79  --  79  --   --  78   ALT 21  --  22  --   --  23   AST 25  --  26  --   --  30   BILITOT 0.4  --  0.5  --   --  0.5    < > = values in this interval not displayed.        Coagulation:   Recent Labs   Lab 11/29/23  1626 11/29/23  2242 11/30/23  0413   INR  --   --  1.1   APTT 32.3* 54.3* 48.8*  48.8*       LDH:  No results for input(s): "LDH" in the last 72 hours.  Microbiology:  Microbiology Results (last 7 days)       Procedure Component Value Units Date/Time    Blood culture [[id number]] Collected: 11/28/23 1452    Order Status: Sent Specimen: Blood from Peripheral, Upper Arm, Right Updated: 11/28/23 1518    Blood culture [[id number]] Collected: 11/28/23 1450    Order Status: Sent Specimen: Blood from Peripheral, Forearm, Right Updated: 11/28/23 1517            BMP:   Recent Labs   Lab 11/30/23  0413   GLU 97   *   K 4.4      CO2 24   BUN 60*   CREATININE 2.1*   CALCIUM 9.4   MG 2.5       I have reviewed all pertinent labs within the past 24 hours.    Estimated Creatinine Clearance: 40.5 mL/min (A) (based on SCr of 2.1 mg/dL (H)).    Diagnostic Results:  I have reviewed all pertinent imaging results/findings within the past 24 hours.  Assessment and Plan:     63 year old male with hx of CAD s/p 3v CABG (unclear anatomy, 2009), ICM with a recent EF of 15-20% s/p ICD (medtronik 2009), DM2 (a1c 7.7), HTN, HLD, Vfib on amio who presents to the ED with CC of SOB     Pt was recently admitted to [facility] as a transfer.  He was started on a Lasix gtt and did well.  Started on gDMT and discharged home on  5 with plans to follow up in HTS clinic for ongoing transplant evaluation at another facility.  He says that " about a few days ago he started to notice LE swelling.  This turned into Nelson and orthopnea.  He says he can't walk to the bathroom without getting SOB.  He also complains of weight gain.  He takes torsemide 20mg BID at home and was told to trial additional Lasix which he did without any improvement.  He was rx metolazone but has not been filled.  He came to the ED     In the ED he was AF with stable VS on RA.  CBC showing chronic anemia.  CMP notable for acute on chronic CKD with baseline around 2.1 and Cr now 2.6.  BNP elevated.  Lactate neg.  CXR showing pulmonary edema.  I evaluated the pt at the bedside.  Bedside TTE showing CVP >15.  He was subsequently admitted to the CCU for diuresis.     He was continued on his home  and was started on a lasix gtt, which he responded well to overnight (net -1700cc. He feels much better this morning as well. Our transplant coordinators have been working on insurance approval and he is now being transferred to [facility] service for transplant evaluation.     * Acute on chronic combined systolic and diastolic CHF, NYHA class 4  Pt with known ICM with an EF of 25% on home  presenting with decompensated HF.  Not in cardiogenic shock    RHC 11/14: RA 14, PA 70/35, PCWP 32, CO 4.1, CI 2.1.   Repeat RHC 11/20 RA RA  20  PA 60/29 (39)  PCWP 29    CO  4.6 l/min  CI  2.3 l/min/m2  2D echo 11/21 showed EF 15-20%, mild RV dysfunction, mild MR, LVEDD 6.9     Repeat on 11/24 showed EF 10-15%, RV mildly reduced, mild-mod MR, LVEDD 6.42  - Home  gtt at 5  - home GDMT: entresto 24/26mg BID (on hold 2/2 IVÁN), Hydralazine 25 being held pre-op   - home diuretics: Was taking lasix 40 bid  - s/p IABP placement 11/21. Augmenting 100s at 1:1  - Continue VAD/OHTx workup. (Awaiting OR date)   -Blood type A+  -PT/OT and nutrition   -Continue current support with  5,  IABP 1:1  -holding Lasix and giving 250cc IVFs  -Monitor strict I/Os  -Plans for LVAD placement tomorrow       PAF (paroxysmal  atrial fibrillation)  Known hx of pAF. In sinus rhythm on admission, but 1 run of AF RVR overnight. He spontaneously converted.  - Continue home amiodarone 400mg qd  - Stop home Eliquis and continue heparin gtt while in the hospital.       IVÁN (acute kidney injury)  IVÁN on CKD2. Baseline Cr of 1.7-2.0.   -Currently 2.1 today with holding diuretics   - Hold ARNi, entresto  -Trend BMPs daily     Type 2 diabetes mellitus without complication, without long-term current use of insulin  -A1c 7.6, not insulin dependent  - MDSSI  -diabetic diet ordered/Boost glucose control   - Endocrine following, appreciate assistance with blood glucose management    CAD (coronary artery disease)  -S/p 3vCABG in 2009  - continue home ASA, HI statin    Ventricular tachycardia  Hx VT s/p ICD placement (medtronic 2009)  - Continue home amio 400mg qd    .Uninterrupted Critical Care/Counseling Time (not including procedures): 60 mins       Hugh Duran NP  Heart Transplant  [facility] - Cardiac Intensive Care

## 2023-11-30 NOTE — SUBJECTIVE & OBJECTIVE
Interval History: No acute events overnight. Reports some constipation again today so given Mag Citrate. Renal function continues to improved while holding diuresis. CVP remains low today so giving 250cc IVFs. Remains on support with IABP @ 1:1 and  5. Awaiting LVAD implant scheduled for tomorrow.       Continuous Infusions:   sodium chloride 0.9%      sodium chloride 0.9% 5 mL/hr at 11/30/23 0900    sodium chloride 0.9% 50 mL/hr at 11/30/23 0901    DOBUTamine 5 mcg/kg/min (11/30/23 0900)    heparin (porcine) in D5W 16 Units/kg/hr (11/30/23 0900)    sodium chloride 0.9% 250 mL/hr at 11/20/23 2002     Scheduled Meds:   amiodarone  400 mg Oral Daily    aspirin  81 mg Oral Daily    atorvastatin  40 mg Oral Daily    insulin aspart U-100  10 Units Subcutaneous TIDWM    insulin detemir U-100  16 Units Subcutaneous QHS    lactulose  15 g Oral Once    magnesium oxide  400 mg Oral Daily    multivitamin  1 tablet Oral Daily    pantoprazole  40 mg Oral Before breakfast    polyethylene glycol  17 g Oral Daily    senna  8.6 mg Oral Daily     PRN Meds:0.9%  NaCl infusion (for blood administration), sodium chloride 0.9%, acetaminophen, baclofen, bisacodyL, dextrose 10%, dextrose 10%, gabapentin, glucagon (human recombinant), glucose, glucose, heparin (PORCINE), heparin (PORCINE), insulin aspart U-100, ondansetron, oxyCODONE, senna-docusate 8.6-50 mg, sodium chloride 0.9%, sodium chloride 0.9%    Review of patient's allergies indicates:  No Known Allergies  Objective:     Vital Signs (Most Recent):  Temp: 98 °F (36.7 °C) (11/30/23 0700)  Pulse: 93 (11/30/23 0900)  Resp: 17 (11/30/23 0900)  BP: 113/64 (11/30/23 0900)  SpO2: 100 % (11/30/23 0900) Vital Signs (24h Range):  Temp:  [98 °F (36.7 °C)-98.7 °F (37.1 °C)] 98 °F (36.7 °C)  Pulse:  [82-93] 93  Resp:  [10-22] 17  SpO2:  [96 %-100 %] 100 %  BP: (100-155)/(54-75) 113/64     Patient Vitals for the past 72 hrs (Last 3 readings):   Weight   11/30/23 0601 77.5 kg (170 lb 13.7 oz)    11/30/23 0400 78.4 kg (172 lb 14.4 oz)   11/29/23 0707 78.5 kg (173 lb)       Body mass index is 23.17 kg/m².      Intake/Output Summary (Last 24 hours) at 11/30/2023 0925  Last data filed at 11/30/2023 0900  Gross per 24 hour   Intake 1355.67 ml   Output 1275 ml   Net 80.67 ml         Hemodynamic Parameters:  PAP: (55-62)/(20-28) 62/28  PAP (Mean):  [35 mmHg-43 mmHg] 43 mmHg    Telemetry: NSR        Physical Exam  Constitutional:       Appearance: Normal appearance. He is ill-appearing.   HENT:      Head: Normocephalic.   Cardiovascular:      Rate and Rhythm: Normal rate and regular rhythm.      Pulses: Normal pulses.      Comments: IABP   Pulmonary:      Effort: Pulmonary effort is normal.      Breath sounds: Normal breath sounds.   Abdominal:      General: Bowel sounds are normal.      Palpations: Abdomen is soft.   Musculoskeletal:         General: Normal range of motion.      Cervical back: Neck supple.   Skin:     General: Skin is warm and dry.   Neurological:      General: No focal deficit present.      Mental Status: He is alert and oriented to person, place, and time.            Significant Labs:  CBC:  Recent Labs   Lab 11/28/23  0201 11/29/23  0400 11/30/23 0413   WBC 6.78 6.82 6.26   RBC 3.08* 3.12* 2.98*   HGB 8.6* 8.7* 8.4*   HCT 26.7* 27.4* 26.2*    297 281   MCV 87 88 88   MCH 27.9 27.9 28.2   MCHC 32.2 31.8* 32.1       BNP:  Recent Labs   Lab 11/29/23  1834   *     CMP:  Recent Labs   Lab 11/28/23  0201 11/28/23  1456 11/29/23  0400 11/29/23  1834 11/29/23  2242 11/30/23  0413   *   < > 133* 145* 87 97   CALCIUM 9.1   < > 9.3 9.3 9.5 9.4   ALBUMIN 2.9*  --  2.9*  --   --  2.9*   PROT 7.1  --  7.3  --   --  7.2   *   < > 134* 136 134* 135*   K 4.2   < > 4.4 4.4 4.6 4.4   CO2 24   < > 25 25 22* 24   CL 98   < > 99 100 98 100   BUN 74*   < > 64* 65* 61* 60*   CREATININE 2.2*   < > 2.4* 2.1* 2.3* 2.1*   ALKPHOS 79  --  79  --   --  78   ALT 21  --  22  --   --  23   AST 25   "--  26  --   --  30   BILITOT 0.4  --  0.5  --   --  0.5    < > = values in this interval not displayed.        Coagulation:   Recent Labs   Lab 11/29/23  1626 11/29/23  2242 11/30/23 0413   INR  --   --  1.1   APTT 32.3* 54.3* 48.8*  48.8*       LDH:  No results for input(s): "LDH" in the last 72 hours.  Microbiology:  Microbiology Results (last 7 days)       Procedure Component Value Units Date/Time    Blood culture [[id number]] Collected: 11/28/23 1452    Order Status: Sent Specimen: Blood from Peripheral, Upper Arm, Right Updated: 11/28/23 1518    Blood culture [[id number]] Collected: 11/28/23 1450    Order Status: Sent Specimen: Blood from Peripheral, Forearm, Right Updated: 11/28/23 1517            BMP:   Recent Labs   Lab 11/30/23 0413   GLU 97   *   K 4.4      CO2 24   BUN 60*   CREATININE 2.1*   CALCIUM 9.4   MG 2.5       I have reviewed all pertinent labs within the past 24 hours.    Estimated Creatinine Clearance: 40.5 mL/min (A) (based on SCr of 2.1 mg/dL (H)).    Diagnostic Results:  I have reviewed all pertinent imaging results/findings within the past 24 hours.  "

## 2023-11-30 NOTE — ASSESSMENT & PLAN NOTE
BG goal 140-180    WBD 0.6 units/kg/day (60/40 split)  - Increase Levemir (Insulin Detemir) to 16 units nightly  (20% reduction due to fasting blood glucose below goal.)    - Continue Novolog (Insulin Aspart) 10 units TIDWM and prn for BG excursions MDC SSI (150/25) (20% reduction due to prandial blood glucose below goal)   - BG checks AC/HS  - Hypoglycemia protocol in place    ** Please notify Endocrine for any change and/or advance in diet**  ** Please call Endocrine for any BG related issues **    Discharge Planning:   TBD. Please notify endocrinology prior to discharge.

## 2023-11-30 NOTE — PROGRESS NOTES
11/30/2023  Albanialee Duarte    Current provider:  Brayan Ocampo MD    Device interrogation:       No data to display                   Rounded on Ledric Abbott to ensure all mechanical assist device settings (IABP or VAD) were appropriate and all parameters were within limits.  I was able to ensure all back up equipment was present, the staff had no issues, and the Perfusion Department daily rounding was complete.      For implantable VADs: Interrogation of Ventricular assist device was performed with analysis of device parameters and review of device function. I have personally reviewed the interrogation findings and agree with findings as stated.     In emergency, the nursing units have been notified to contact the perfusion department either by:  Calling [id number] from 630am to 4pm Mon thru Fri, utilizing the On-Call Finder functionality of Epic and searching for Perfusion, or by contacting the hospital  from 4pm to 630am and on weekends and asking to speak with the perfusionist on call.    2:28 PM

## 2023-11-30 NOTE — SUBJECTIVE & OBJECTIVE
"Interval HPI:   Overnight events: No acute events overnight. Patient in room [id number] A. Blood glucose stable. BG at and below goal on current insulin regimen (SSI, prandial, and basal insulin ). Steroid use- None. 9 Days Post-Op  Renal function- Abnormal - Creatinine 2.1   Vasopressors-  None       Endocrine will continue to follow and manage insulin orders inpatient.         Diet diabetic 2800 Calorie; Fluid - 2000mL; Standard Tray  Diet NPO Except for: Medication     Eatin%  Nausea: No  Hypoglycemia and intervention: No  Fever: No  TPN and/or TF: No      /64 (BP Location: Right arm, Patient Position: Lying)   Pulse 93   Temp 98 °F (36.7 °C) (Oral)   Resp 17   Ht 6' (1.829 m)   Wt 77.5 kg (170 lb 13.7 oz)   SpO2 100%   BMI 23.17 kg/m²     Labs Reviewed and Include    Recent Labs   Lab 23  0413   GLU 97   CALCIUM 9.4   ALBUMIN 2.9*   PROT 7.2   *   K 4.4   CO2 24      BUN 60*   CREATININE 2.1*   ALKPHOS 78   ALT 23   AST 30   BILITOT 0.5     Lab Results   Component Value Date    WBC 6.26 2023    HGB 8.4 (L) 2023    HCT 26.2 (L) 2023    MCV 88 2023     2023     No results for input(s): "TSH", "FREET4" in the last 168 hours.  Lab Results   Component Value Date    HGBA1C 7.6 (H) 10/12/2023       Nutritional status:   Body mass index is 23.17 kg/m².  Lab Results   Component Value Date    ALBUMIN 2.9 (L) 2023    ALBUMIN 2.9 (L) 2023    ALBUMIN 2.9 (L) 2023     Lab Results   Component Value Date    PREALBUMIN 27 11/15/2023       Estimated Creatinine Clearance: 40.5 mL/min (A) (based on SCr of 2.1 mg/dL (H)).    Accu-Checks  Recent Labs     23  0821 23  1232 23  1748 23  2133 23  0359 23  0811 23  1233 23  1625 23  1957 23   POCTGLUCOSE 178* 167* 168* 152* 141* 198* 142* 122* 93 83       Current Medications and/or Treatments Impacting Glycemic " Control  Immunotherapy:    Immunosuppressants       None          Steroids:   Hormones (From admission, onward)      None          Pressors:    Autonomic Drugs (From admission, onward)      None          Hyperglycemia/Diabetes Medications:   Antihyperglycemics (From admission, onward)      Start     Stop Route Frequency Ordered    11/30/23 2100  insulin detemir U-100 (Levemir) pen 16 Units         -- SubQ Nightly 11/30/23 0856    11/30/23 1130  insulin aspart U-100 pen 10 Units         -- SubQ 3 times daily with meals 11/30/23 0856    11/20/23 1747  insulin aspart U-100 pen 0-10 Units         -- SubQ Before meals & nightly PRN 11/20/23 8860

## 2023-11-30 NOTE — ASSESSMENT & PLAN NOTE
IVÁN on CKD2. Baseline Cr of 1.7-2.0.   -Currently 2.1 today with holding diuretics   - Hold ARNi, entresto  -Trend BMPs daily

## 2023-11-30 NOTE — PROGRESS NOTES
Visited patient and wife at bedside. Patient and wife verbalized readiness for VAD implant tomorrow. All questions answered to wife and patient's satisfaction.

## 2023-11-30 NOTE — SUBJECTIVE & OBJECTIVE
Interval History: Patient underwent HM3 LVAD implant today. Returned to the SICU supported on epi, , Ketty. Flows reportedly dropped to 2.2 responded well to albumin and HTN control.       Continuous Infusions:   sodium chloride 0.9% 5 mL/hr at 12/01/23 0601    sodium chloride 0.9% 10 mL/hr at 12/01/23 1720    DOBUTamine 5 mcg/kg/min (12/01/23 1700)    EPINEPHrine 0.1 mcg/kg/min (12/01/23 1700)    furosemide (LASIX) 2 mg/mL continuous infusion (non-titrating)      heparin (porcine) in D5W Stopped (12/01/23 0655)    insulin regular 1 units/mL infusion orderable (DKA) 5 Units/hr (12/01/23 1700)    nitric oxide gas      NORepinephrine bitartrate-D5W      propofoL 50 mcg/kg/min (12/01/23 1700)    vasopressin       Scheduled Meds:   amiodarone  400 mg Oral Daily    aspirin  325 mg Per NG tube Daily    atorvastatin  40 mg Oral Daily    ceFEPime (MAXIPIME) IVPB  1 g Intravenous Q8H    docusate sodium  200 mg Oral QHS    [START ON 12/2/2023] famotidine (PF)  20 mg Intravenous Daily    [START ON 12/2/2023] ferrous gluconate  324 mg Oral Daily with breakfast    furosemide (LASIX) injection  40 mg Intravenous Once    lactulose  15 g Oral Once    magnesium oxide  400 mg Oral Daily    multivitamin  1 tablet Oral Daily    mupirocin  1 g Nasal BID    mupirocin   Nasal BID    pantoprazole  40 mg Oral Before breakfast    polyethylene glycol  17 g Oral Daily    senna  8.6 mg Oral Daily     PRN Meds:0.9%  NaCl infusion (for blood administration), 0.9%  NaCl infusion (for blood administration), 0.9%  NaCl infusion (for blood administration), acetaminophen, albumin human 5%, albuterol sulfate, baclofen, bisacodyL, bisacodyL, dextrose 10%, dextrose 10%, dextrose 10%, dextrose 10%, gabapentin, heparin (PORCINE), heparin (PORCINE), magnesium hydroxide 400 mg/5 ml, magnesium sulfate IVPB, mupirocin, ondansetron, oxyCODONE, oxyCODONE, oxyCODONE, potassium chloride, potassium chloride, senna-docusate 8.6-50 mg,  sodium chloride 0.9%    Review of patient's allergies indicates:  No Known Allergies  Objective:     Vital Signs (Most Recent):  Temp: 97.9 °F (36.6 °C) (12/01/23 1545)  Pulse: 87 (12/01/23 1744)  Resp: 18 (12/01/23 1744)  BP: (!) 109/56 (12/01/23 0601)  SpO2: 100 % (12/01/23 1744) Vital Signs (24h Range):  Temp:  [97.9 °F (36.6 °C)-99.4 °F (37.4 °C)] 97.9 °F (36.6 °C)  Pulse:  [] 87  Resp:  [14-24] 18  SpO2:  [88 %-100 %] 100 %  BP: (109-146)/(55-87) 109/56  Arterial Line BP: (76-95)/(52-69) 80/55     Patient Vitals for the past 72 hrs (Last 3 readings):   Weight   12/01/23 0344 77.5 kg (170 lb 13.7 oz)   11/30/23 0601 77.5 kg (170 lb 13.7 oz)   11/30/23 0400 78.4 kg (172 lb 14.4 oz)       Body mass index is 23.17 kg/m².      Intake/Output Summary (Last 24 hours) at 12/1/2023 1800  Last data filed at 12/1/2023 1700  Gross per 24 hour   Intake 3424.66 ml   Output 1355 ml   Net 2069.66 ml         Hemodynamic Parameters:  PAP: (31-38)/(19-22) 34/22  PAP (Mean):  [23 mmHg-27 mmHg] 27 mmHg    Telemetry: NSR        Physical Exam  Constitutional:       Comments: Intubated and sedated   HENT:      Head: Normocephalic and atraumatic.   Cardiovascular:      Rate and Rhythm: Normal rate and regular rhythm.      Comments: LVAD hum  Pulmonary:      Effort: Pulmonary effort is normal.      Breath sounds: Normal breath sounds.      Comments: Mechanically ventilated  Abdominal:      General: Abdomen is flat.   Musculoskeletal:         General: Normal range of motion.      Cervical back: Neck supple.   Skin:     General: Skin is warm and dry.   Neurological:      Comments: sedated          Significant Labs:  CBC:  Recent Labs   Lab 12/01/23  0325 12/01/23  0747 12/01/23  1202 12/01/23  1224 12/01/23  1531 12/01/23  1533 12/01/23  1644 12/01/23  1744   WBC 6.84  --  11.82  --  14.69*  --   --   --    RBC 2.87*  --  2.37*  --  3.28*  --   --   --    HGB 7.9*  --  6.8*  --  9.6*  --   --   --    HCT 24.9*   < > 21.4*   < > 27.7*  "25* 22* 21*     --  152  --  137*  --   --   --    MCV 87  --  90  --  85  --   --   --    MCH 27.5  --  28.7  --  29.3  --   --   --    MCHC 31.7*  --  31.8*  --  34.7  --   --   --     < > = values in this interval not displayed.       BNP:  Recent Labs   Lab 11/29/23  1834   *       CMP:  Recent Labs   Lab 11/30/23 0413 11/30/23  1530 12/01/23 0325 12/01/23  1531   GLU 97 176* 102 202*   CALCIUM 9.4 9.2 8.8 7.9*   ALBUMIN 2.9*  --  2.8* 1.9*   PROT 7.2  --  6.7 4.4*   * 134* 135* 138   K 4.4 4.8 4.5 5.2*   CO2 24 21* 25 17*    103 101 111*   BUN 60* 58* 52* 41*   CREATININE 2.1* 2.3* 2.1* 1.7*   ALKPHOS 78  --  74 48*   ALT 23  --  26 23   AST 30  --  31 69*   BILITOT 0.5  --  0.4 1.7*        Coagulation:   Recent Labs   Lab 11/30/23 0413 12/01/23 0325 12/01/23  1531   INR 1.1  --  1.2   APTT 48.8*  48.8* 50.3* 35.0*       LDH:  No results for input(s): "LDH" in the last 72 hours.  Microbiology:  Microbiology Results (last 7 days)       Procedure Component Value Units Date/Time    Blood culture [[id number]] Collected: 11/28/23 1452    Order Status: Completed Specimen: Blood from Peripheral, Upper Arm, Right Updated: 12/01/23 1612     Blood Culture, Routine No growth to date      No Growth to date      No Growth to date    Blood culture [[id number]] Collected: 11/28/23 1450    Order Status: Completed Specimen: Blood from Peripheral, Forearm, Right Updated: 12/01/23 1612     Blood Culture, Routine No growth to date      No Growth to date      No Growth to date            BMP:   Recent Labs   Lab 12/01/23  1531   *      K 5.2*   *   CO2 17*   BUN 41*   CREATININE 1.7*   CALCIUM 7.9*   MG 2.8*       I have reviewed all pertinent labs within the past 24 hours.    Estimated Creatinine Clearance: 50 mL/min (A) (based on SCr of 1.7 mg/dL (H)).    Diagnostic Results:  I have reviewed all pertinent imaging results/findings within the past 24 hours.  "

## 2023-12-01 ENCOUNTER — DOCUMENTATION ONLY (OUTPATIENT)
Dept: CARDIOLOGY | Facility: HOSPITAL | Age: 63
End: 2023-12-01
Payer: MEDICAID

## 2023-12-01 ENCOUNTER — ANESTHESIA (OUTPATIENT)
Dept: SURGERY | Facility: HOSPITAL | Age: 63
DRG: 001 | End: 2023-12-01
Payer: MEDICAID

## 2023-12-01 ENCOUNTER — ANESTHESIA EVENT (OUTPATIENT)
Dept: SURGERY | Facility: HOSPITAL | Age: 63
DRG: 001 | End: 2023-12-01
Payer: MEDICAID

## 2023-12-01 ENCOUNTER — DOCUMENTATION ONLY (OUTPATIENT)
Dept: ELECTROPHYSIOLOGY | Facility: CLINIC | Age: 63
End: 2023-12-01
Payer: MEDICAID

## 2023-12-01 PROBLEM — I50.43 ACUTE ON CHRONIC COMBINED SYSTOLIC AND DIASTOLIC CHF, NYHA CLASS 4: Chronic | Status: ACTIVE | Noted: 2023-10-17

## 2023-12-01 PROBLEM — Z95.811 LVAD (LEFT VENTRICULAR ASSIST DEVICE) PRESENT: Status: ACTIVE | Noted: 2023-12-01

## 2023-12-01 LAB
ABO + RH BLD: NORMAL
ALBUMIN SERPL BCP-MCNC: 1.9 G/DL (ref 3.5–5.2)
ALBUMIN SERPL BCP-MCNC: 2.6 G/DL (ref 3.5–5.2)
ALBUMIN SERPL BCP-MCNC: 2.7 G/DL (ref 3.5–5.2)
ALBUMIN SERPL BCP-MCNC: 2.8 G/DL (ref 3.5–5.2)
ALLENS TEST: ABNORMAL
ALP SERPL-CCNC: 44 U/L (ref 55–135)
ALP SERPL-CCNC: 46 U/L (ref 55–135)
ALP SERPL-CCNC: 48 U/L (ref 55–135)
ALP SERPL-CCNC: 74 U/L (ref 55–135)
ALT SERPL W/O P-5'-P-CCNC: 23 U/L (ref 10–44)
ALT SERPL W/O P-5'-P-CCNC: 23 U/L (ref 10–44)
ALT SERPL W/O P-5'-P-CCNC: 25 U/L (ref 10–44)
ALT SERPL W/O P-5'-P-CCNC: 26 U/L (ref 10–44)
ANION GAP SERPL CALC-SCNC: 10 MMOL/L (ref 8–16)
ANION GAP SERPL CALC-SCNC: 11 MMOL/L (ref 8–16)
ANION GAP SERPL CALC-SCNC: 9 MMOL/L (ref 8–16)
ANION GAP SERPL CALC-SCNC: 9 MMOL/L (ref 8–16)
ANISOCYTOSIS BLD QL SMEAR: SLIGHT
APTT PPP: 29.6 SEC (ref 21–32)
APTT PPP: 32.2 SEC (ref 21–32)
APTT PPP: 35 SEC (ref 21–32)
APTT PPP: 50.3 SEC (ref 21–32)
AST SERPL-CCNC: 31 U/L (ref 10–40)
AST SERPL-CCNC: 67 U/L (ref 10–40)
AST SERPL-CCNC: 69 U/L (ref 10–40)
AST SERPL-CCNC: 73 U/L (ref 10–40)
BASO STIPL BLD QL SMEAR: ABNORMAL
BASOPHILS # BLD AUTO: 0.02 K/UL (ref 0–0.2)
BASOPHILS # BLD AUTO: 0.03 K/UL (ref 0–0.2)
BASOPHILS # BLD AUTO: 0.04 K/UL (ref 0–0.2)
BASOPHILS # BLD AUTO: 0.04 K/UL (ref 0–0.2)
BASOPHILS # BLD AUTO: 0.05 K/UL (ref 0–0.2)
BASOPHILS NFR BLD: 0.2 % (ref 0–1.9)
BASOPHILS NFR BLD: 0.2 % (ref 0–1.9)
BASOPHILS NFR BLD: 0.3 % (ref 0–1.9)
BASOPHILS NFR BLD: 0.3 % (ref 0–1.9)
BASOPHILS NFR BLD: 0.7 % (ref 0–1.9)
BILIRUB SERPL-MCNC: 0.4 MG/DL (ref 0.1–1)
BILIRUB SERPL-MCNC: 1.7 MG/DL (ref 0.1–1)
BILIRUB SERPL-MCNC: 2.6 MG/DL (ref 0.1–1)
BILIRUB SERPL-MCNC: 3.1 MG/DL (ref 0.1–1)
BLD GP AB SCN CELLS X3 SERPL QL: NORMAL
BLD PROD TYP BPU: NORMAL
BLOOD UNIT EXPIRATION DATE: NORMAL
BLOOD UNIT TYPE CODE: 600
BLOOD UNIT TYPE CODE: 6200
BLOOD UNIT TYPE: NORMAL
BUN SERPL-MCNC: 41 MG/DL (ref 8–23)
BUN SERPL-MCNC: 44 MG/DL (ref 8–23)
BUN SERPL-MCNC: 44 MG/DL (ref 8–23)
BUN SERPL-MCNC: 52 MG/DL (ref 8–23)
BURR CELLS BLD QL SMEAR: ABNORMAL
BURR CELLS BLD QL SMEAR: ABNORMAL
CALCIUM SERPL-MCNC: 7.9 MG/DL (ref 8.7–10.5)
CALCIUM SERPL-MCNC: 7.9 MG/DL (ref 8.7–10.5)
CALCIUM SERPL-MCNC: 8 MG/DL (ref 8.7–10.5)
CALCIUM SERPL-MCNC: 8.8 MG/DL (ref 8.7–10.5)
CHLORIDE SERPL-SCNC: 101 MMOL/L (ref 95–110)
CHLORIDE SERPL-SCNC: 110 MMOL/L (ref 95–110)
CHLORIDE SERPL-SCNC: 110 MMOL/L (ref 95–110)
CHLORIDE SERPL-SCNC: 111 MMOL/L (ref 95–110)
CO2 SERPL-SCNC: 17 MMOL/L (ref 23–29)
CO2 SERPL-SCNC: 20 MMOL/L (ref 23–29)
CO2 SERPL-SCNC: 21 MMOL/L (ref 23–29)
CO2 SERPL-SCNC: 25 MMOL/L (ref 23–29)
CODING SYSTEM: NORMAL
CREAT SERPL-MCNC: 1.7 MG/DL (ref 0.5–1.4)
CREAT SERPL-MCNC: 1.9 MG/DL (ref 0.5–1.4)
CREAT SERPL-MCNC: 2 MG/DL (ref 0.5–1.4)
CREAT SERPL-MCNC: 2.1 MG/DL (ref 0.5–1.4)
CROSSMATCH INTERPRETATION: NORMAL
DELSYS: ABNORMAL
DIFFERENTIAL METHOD BLD: ABNORMAL
DISPENSE STATUS: NORMAL
EOSINOPHIL # BLD AUTO: 0 K/UL (ref 0–0.5)
EOSINOPHIL # BLD AUTO: 0.1 K/UL (ref 0–0.5)
EOSINOPHIL NFR BLD: 0 % (ref 0–8)
EOSINOPHIL NFR BLD: 0 % (ref 0–8)
EOSINOPHIL NFR BLD: 0.1 % (ref 0–8)
EOSINOPHIL NFR BLD: 0.3 % (ref 0–8)
EOSINOPHIL NFR BLD: 2 % (ref 0–8)
ERYTHROCYTE [DISTWIDTH] IN BLOOD BY AUTOMATED COUNT: 13.8 % (ref 11.5–14.5)
ERYTHROCYTE [DISTWIDTH] IN BLOOD BY AUTOMATED COUNT: 13.9 % (ref 11.5–14.5)
ERYTHROCYTE [DISTWIDTH] IN BLOOD BY AUTOMATED COUNT: 15.3 % (ref 11.5–14.5)
ERYTHROCYTE [DISTWIDTH] IN BLOOD BY AUTOMATED COUNT: 15.5 % (ref 11.5–14.5)
ERYTHROCYTE [DISTWIDTH] IN BLOOD BY AUTOMATED COUNT: 15.8 % (ref 11.5–14.5)
ERYTHROCYTE [SEDIMENTATION RATE] IN BLOOD BY WESTERGREN METHOD: 18 MM/H
ERYTHROCYTE [SEDIMENTATION RATE] IN BLOOD BY WESTERGREN METHOD: 20 MM/H
ERYTHROCYTE [SEDIMENTATION RATE] IN BLOOD BY WESTERGREN METHOD: 24 MM/H
ERYTHROCYTE [SEDIMENTATION RATE] IN BLOOD BY WESTERGREN METHOD: 24 MM/H
EST. GFR  (NO RACE VARIABLE): 35.2 ML/MIN/1.73 M^2
EST. GFR  (NO RACE VARIABLE): 37.3 ML/MIN/1.73 M^2
EST. GFR  (NO RACE VARIABLE): 39.6 ML/MIN/1.73 M^2
EST. GFR  (NO RACE VARIABLE): 45.3 ML/MIN/1.73 M^2
FIBRINOGEN PPP-MCNC: 311 MG/DL (ref 182–400)
FIBRINOGEN PPP-MCNC: 322 MG/DL (ref 182–400)
FIBRINOGEN PPP-MCNC: 329 MG/DL (ref 182–400)
FIBRINOGEN PPP-MCNC: 342 MG/DL (ref 182–400)
FIBRINOGEN PPP-MCNC: 371 MG/DL (ref 182–400)
FIO2: 100
FIO2: 40
FIO2: 55
FIO2: 55
FIO2: 60
FIO2: 60
FIO2: 70
FIO2: 70
GLUCOSE SERPL-MCNC: 102 MG/DL (ref 70–110)
GLUCOSE SERPL-MCNC: 135 MG/DL (ref 70–110)
GLUCOSE SERPL-MCNC: 140 MG/DL (ref 70–110)
GLUCOSE SERPL-MCNC: 164 MG/DL (ref 70–110)
GLUCOSE SERPL-MCNC: 202 MG/DL (ref 70–110)
GLUCOSE SERPL-MCNC: 209 MG/DL (ref 70–110)
GLUCOSE SERPL-MCNC: 228 MG/DL (ref 70–110)
GLUCOSE SERPL-MCNC: 247 MG/DL (ref 70–110)
GLUCOSE SERPL-MCNC: 253 MG/DL (ref 70–110)
GLUCOSE SERPL-MCNC: 299 MG/DL (ref 70–110)
HCO3 UR-SCNC: 15.1 MMOL/L (ref 24–28)
HCO3 UR-SCNC: 17.5 MMOL/L (ref 24–28)
HCO3 UR-SCNC: 19.7 MMOL/L (ref 24–28)
HCO3 UR-SCNC: 21.1 MMOL/L (ref 24–28)
HCO3 UR-SCNC: 21.4 MMOL/L (ref 24–28)
HCO3 UR-SCNC: 22 MMOL/L (ref 24–28)
HCO3 UR-SCNC: 22.1 MMOL/L (ref 24–28)
HCO3 UR-SCNC: 22.2 MMOL/L (ref 24–28)
HCO3 UR-SCNC: 22.2 MMOL/L (ref 24–28)
HCO3 UR-SCNC: 22.6 MMOL/L (ref 24–28)
HCO3 UR-SCNC: 23.2 MMOL/L (ref 24–28)
HCO3 UR-SCNC: 23.4 MMOL/L (ref 24–28)
HCO3 UR-SCNC: 24.3 MMOL/L (ref 24–28)
HCT VFR BLD AUTO: 20.4 % (ref 40–54)
HCT VFR BLD AUTO: 21.4 % (ref 40–54)
HCT VFR BLD AUTO: 21.9 % (ref 40–54)
HCT VFR BLD AUTO: 24.9 % (ref 40–54)
HCT VFR BLD AUTO: 27.7 % (ref 40–54)
HCT VFR BLD CALC: 18 %PCV (ref 36–54)
HCT VFR BLD CALC: 20 %PCV (ref 36–54)
HCT VFR BLD CALC: 20 %PCV (ref 36–54)
HCT VFR BLD CALC: 21 %PCV (ref 36–54)
HCT VFR BLD CALC: 22 %PCV (ref 36–54)
HCT VFR BLD CALC: 23 %PCV (ref 36–54)
HCT VFR BLD CALC: 24 %PCV (ref 36–54)
HCT VFR BLD CALC: 24 %PCV (ref 36–54)
HCT VFR BLD CALC: 25 %PCV (ref 36–54)
HCT VFR BLD CALC: 29 %PCV (ref 36–54)
HGB BLD-MCNC: 6.8 G/DL (ref 14–18)
HGB BLD-MCNC: 7.1 G/DL (ref 14–18)
HGB BLD-MCNC: 7.8 G/DL (ref 14–18)
HGB BLD-MCNC: 7.9 G/DL (ref 14–18)
HGB BLD-MCNC: 9.6 G/DL (ref 14–18)
IMM GRANULOCYTES # BLD AUTO: 0.04 K/UL (ref 0–0.04)
IMM GRANULOCYTES # BLD AUTO: 0.09 K/UL (ref 0–0.04)
IMM GRANULOCYTES # BLD AUTO: 0.1 K/UL (ref 0–0.04)
IMM GRANULOCYTES # BLD AUTO: 0.1 K/UL (ref 0–0.04)
IMM GRANULOCYTES # BLD AUTO: 0.14 K/UL (ref 0–0.04)
IMM GRANULOCYTES NFR BLD AUTO: 0.6 % (ref 0–0.5)
IMM GRANULOCYTES NFR BLD AUTO: 0.8 % (ref 0–0.5)
IMM GRANULOCYTES NFR BLD AUTO: 0.8 % (ref 0–0.5)
IMM GRANULOCYTES NFR BLD AUTO: 1 % (ref 0–0.5)
IMM GRANULOCYTES NFR BLD AUTO: 1 % (ref 0–0.5)
INR PPP: 1.1 (ref 0.8–1.2)
INR PPP: 1.1 (ref 0.8–1.2)
INR PPP: 1.2 (ref 0.8–1.2)
LDH SERPL L TO P-CCNC: 0.39 MMOL/L (ref 0.36–1.25)
LDH SERPL L TO P-CCNC: 1.14 MMOL/L (ref 0.36–1.25)
LDH SERPL L TO P-CCNC: 1.6 MMOL/L (ref 0.36–1.25)
LDH SERPL L TO P-CCNC: 1.92 MMOL/L (ref 0.36–1.25)
LDH SERPL L TO P-CCNC: 2.33 MMOL/L (ref 0.36–1.25)
LDH SERPL L TO P-CCNC: 3.59 MMOL/L (ref 0.36–1.25)
LDH SERPL L TO P-CCNC: 3.65 MMOL/L (ref 0.36–1.25)
LDH SERPL L TO P-CCNC: 3.96 MMOL/L (ref 0.36–1.25)
LDH SERPL L TO P-CCNC: 489 U/L (ref 110–260)
LYMPHOCYTES # BLD AUTO: 0.3 K/UL (ref 1–4.8)
LYMPHOCYTES # BLD AUTO: 0.3 K/UL (ref 1–4.8)
LYMPHOCYTES # BLD AUTO: 0.6 K/UL (ref 1–4.8)
LYMPHOCYTES # BLD AUTO: 0.7 K/UL (ref 1–4.8)
LYMPHOCYTES # BLD AUTO: 1.1 K/UL (ref 1–4.8)
LYMPHOCYTES NFR BLD: 16.1 % (ref 18–48)
LYMPHOCYTES NFR BLD: 2.4 % (ref 18–48)
LYMPHOCYTES NFR BLD: 2.5 % (ref 18–48)
LYMPHOCYTES NFR BLD: 4.2 % (ref 18–48)
LYMPHOCYTES NFR BLD: 5.9 % (ref 18–48)
MAGNESIUM SERPL-MCNC: 2.6 MG/DL (ref 1.6–2.6)
MAGNESIUM SERPL-MCNC: 2.7 MG/DL (ref 1.6–2.6)
MAGNESIUM SERPL-MCNC: 2.8 MG/DL (ref 1.6–2.6)
MAGNESIUM SERPL-MCNC: 2.8 MG/DL (ref 1.6–2.6)
MCH RBC QN AUTO: 27.5 PG (ref 27–31)
MCH RBC QN AUTO: 28.2 PG (ref 27–31)
MCH RBC QN AUTO: 28.3 PG (ref 27–31)
MCH RBC QN AUTO: 28.7 PG (ref 27–31)
MCH RBC QN AUTO: 29.3 PG (ref 27–31)
MCHC RBC AUTO-ENTMCNC: 31.7 G/DL (ref 32–36)
MCHC RBC AUTO-ENTMCNC: 31.8 G/DL (ref 32–36)
MCHC RBC AUTO-ENTMCNC: 34.7 G/DL (ref 32–36)
MCHC RBC AUTO-ENTMCNC: 34.8 G/DL (ref 32–36)
MCHC RBC AUTO-ENTMCNC: 35.6 G/DL (ref 32–36)
MCV RBC AUTO: 79 FL (ref 82–98)
MCV RBC AUTO: 81 FL (ref 82–98)
MCV RBC AUTO: 85 FL (ref 82–98)
MCV RBC AUTO: 87 FL (ref 82–98)
MCV RBC AUTO: 90 FL (ref 82–98)
MIN VOL: 10.6
MIN VOL: 10.6
MIN VOL: 10.7
MIN VOL: 12.8
MIN VOL: 12.8
MIN VOL: 9
MIN VOL: 9.5
MIN VOL: 9.5
MODE: ABNORMAL
MONOCYTES # BLD AUTO: 0.1 K/UL (ref 0.3–1)
MONOCYTES # BLD AUTO: 0.7 K/UL (ref 0.3–1)
MONOCYTES # BLD AUTO: 0.9 K/UL (ref 0.3–1)
MONOCYTES NFR BLD: 1.1 % (ref 4–15)
MONOCYTES NFR BLD: 4.6 % (ref 4–15)
MONOCYTES NFR BLD: 7 % (ref 4–15)
MONOCYTES NFR BLD: 7.8 % (ref 4–15)
MONOCYTES NFR BLD: 9.8 % (ref 4–15)
NEUTROPHILS # BLD AUTO: 10.7 K/UL (ref 1.8–7.7)
NEUTROPHILS # BLD AUTO: 10.8 K/UL (ref 1.8–7.7)
NEUTROPHILS # BLD AUTO: 13.2 K/UL (ref 1.8–7.7)
NEUTROPHILS # BLD AUTO: 4.8 K/UL (ref 1.8–7.7)
NEUTROPHILS # BLD AUTO: 9.3 K/UL (ref 1.8–7.7)
NEUTROPHILS NFR BLD: 70.8 % (ref 38–73)
NEUTROPHILS NFR BLD: 88.8 % (ref 38–73)
NEUTROPHILS NFR BLD: 89.3 % (ref 38–73)
NEUTROPHILS NFR BLD: 89.8 % (ref 38–73)
NEUTROPHILS NFR BLD: 91.6 % (ref 38–73)
NRBC BLD-RTO: 0 /100 WBC
NUM UNITS TRANS FFP: NORMAL
NUM UNITS TRANS PACKED RBC: NORMAL
PCO2 BLDA: 22.9 MMHG (ref 35–45)
PCO2 BLDA: 25.1 MMHG (ref 35–45)
PCO2 BLDA: 30 MMHG (ref 35–45)
PCO2 BLDA: 31.2 MMHG (ref 35–45)
PCO2 BLDA: 31.7 MMHG (ref 35–45)
PCO2 BLDA: 34.5 MMHG (ref 35–45)
PCO2 BLDA: 36.8 MMHG (ref 35–45)
PCO2 BLDA: 37.2 MMHG (ref 35–45)
PCO2 BLDA: 41.7 MMHG (ref 35–45)
PCO2 BLDA: 41.7 MMHG (ref 35–45)
PCO2 BLDA: 42.4 MMHG (ref 35–45)
PCO2 BLDA: 43.7 MMHG (ref 35–45)
PCO2 BLDA: 52.2 MMHG (ref 35–45)
PEEP: 5
PH SMN: 7.23 [PH] (ref 7.35–7.45)
PH SMN: 7.32 [PH] (ref 7.35–7.45)
PH SMN: 7.33 [PH] (ref 7.35–7.45)
PH SMN: 7.36 [PH] (ref 7.35–7.45)
PH SMN: 7.43 [PH] (ref 7.35–7.45)
PH SMN: 7.45 [PH] (ref 7.35–7.45)
PH SMN: 7.45 [PH] (ref 7.35–7.45)
PH SMN: 7.46 [PH] (ref 7.35–7.45)
PH SMN: 7.46 [PH] (ref 7.35–7.45)
PHOSPHATE SERPL-MCNC: 2.5 MG/DL (ref 2.7–4.5)
PHOSPHATE SERPL-MCNC: 2.6 MG/DL (ref 2.7–4.5)
PHOSPHATE SERPL-MCNC: 4.7 MG/DL (ref 2.7–4.5)
PIP: 21
PIP: 24
PIP: 25
PIP: 25
PIP: 26
PIP: 26
PIP: 27
PIP: 27
PIP: 29
PIP: 29
PLATELET # BLD AUTO: 137 K/UL (ref 150–450)
PLATELET # BLD AUTO: 150 K/UL (ref 150–450)
PLATELET # BLD AUTO: 152 K/UL (ref 150–450)
PLATELET # BLD AUTO: 163 K/UL (ref 150–450)
PLATELET # BLD AUTO: 280 K/UL (ref 150–450)
PLATELET BLD QL SMEAR: ABNORMAL
PMV BLD AUTO: 10.2 FL (ref 9.2–12.9)
PMV BLD AUTO: 10.3 FL (ref 9.2–12.9)
PMV BLD AUTO: 10.5 FL (ref 9.2–12.9)
PMV BLD AUTO: 10.6 FL (ref 9.2–12.9)
PMV BLD AUTO: 10.7 FL (ref 9.2–12.9)
PO2 BLDA: 173 MMHG (ref 80–100)
PO2 BLDA: 174 MMHG (ref 80–100)
PO2 BLDA: 176 MMHG (ref 80–100)
PO2 BLDA: 185 MMHG (ref 80–100)
PO2 BLDA: 198 MMHG (ref 80–100)
PO2 BLDA: 231 MMHG (ref 80–100)
PO2 BLDA: 28 MMHG (ref 40–60)
PO2 BLDA: 285 MMHG (ref 80–100)
PO2 BLDA: 311 MMHG (ref 80–100)
PO2 BLDA: 44 MMHG (ref 40–60)
PO2 BLDA: 469 MMHG (ref 80–100)
PO2 BLDA: 477 MMHG (ref 80–100)
PO2 BLDA: 511 MMHG (ref 80–100)
POC BE: -1 MMOL/L
POC BE: -1 MMOL/L
POC BE: -2 MMOL/L
POC BE: -3 MMOL/L
POC BE: -3 MMOL/L
POC BE: -4 MMOL/L
POC BE: -6 MMOL/L
POC BE: -9 MMOL/L
POC BE: 0 MMOL/L
POC IONIZED CALCIUM: 0.99 MMOL/L (ref 1.06–1.42)
POC IONIZED CALCIUM: 1.03 MMOL/L (ref 1.06–1.42)
POC IONIZED CALCIUM: 1.07 MMOL/L (ref 1.06–1.42)
POC IONIZED CALCIUM: 1.08 MMOL/L (ref 1.06–1.42)
POC IONIZED CALCIUM: 1.1 MMOL/L (ref 1.06–1.42)
POC IONIZED CALCIUM: 1.13 MMOL/L (ref 1.06–1.42)
POC IONIZED CALCIUM: 1.13 MMOL/L (ref 1.06–1.42)
POC IONIZED CALCIUM: 1.14 MMOL/L (ref 1.06–1.42)
POC IONIZED CALCIUM: 1.26 MMOL/L (ref 1.06–1.42)
POC SATURATED O2: 100 % (ref 95–100)
POC SATURATED O2: 42 % (ref 95–100)
POC SATURATED O2: 77 % (ref 95–100)
POC TCO2: 16 MMOL/L (ref 23–27)
POC TCO2: 18 MMOL/L (ref 23–27)
POC TCO2: 21 MMOL/L (ref 23–27)
POC TCO2: 22 MMOL/L (ref 23–27)
POC TCO2: 22 MMOL/L (ref 23–27)
POC TCO2: 23 MMOL/L (ref 23–27)
POC TCO2: 23 MMOL/L (ref 24–29)
POC TCO2: 24 MMOL/L (ref 23–27)
POC TCO2: 24 MMOL/L (ref 24–29)
POC TCO2: 25 MMOL/L (ref 23–27)
POC TCO2: 25 MMOL/L (ref 23–27)
POCT GLUCOSE: 122 MG/DL (ref 70–110)
POCT GLUCOSE: 135 MG/DL (ref 70–110)
POCT GLUCOSE: 141 MG/DL (ref 70–110)
POCT GLUCOSE: 148 MG/DL (ref 70–110)
POCT GLUCOSE: 151 MG/DL (ref 70–110)
POCT GLUCOSE: 167 MG/DL (ref 70–110)
POCT GLUCOSE: 169 MG/DL (ref 70–110)
POCT GLUCOSE: 188 MG/DL (ref 70–110)
POCT GLUCOSE: 217 MG/DL (ref 70–110)
POLYCHROMASIA BLD QL SMEAR: ABNORMAL
POTASSIUM BLD-SCNC: 4.4 MMOL/L (ref 3.5–5.1)
POTASSIUM BLD-SCNC: 4.4 MMOL/L (ref 3.5–5.1)
POTASSIUM BLD-SCNC: 4.5 MMOL/L (ref 3.5–5.1)
POTASSIUM BLD-SCNC: 4.6 MMOL/L (ref 3.5–5.1)
POTASSIUM BLD-SCNC: 4.7 MMOL/L (ref 3.5–5.1)
POTASSIUM BLD-SCNC: 4.8 MMOL/L (ref 3.5–5.1)
POTASSIUM BLD-SCNC: 5.2 MMOL/L (ref 3.5–5.1)
POTASSIUM BLD-SCNC: 5.3 MMOL/L (ref 3.5–5.1)
POTASSIUM BLD-SCNC: 5.4 MMOL/L (ref 3.5–5.1)
POTASSIUM BLD-SCNC: 5.9 MMOL/L (ref 3.5–5.1)
POTASSIUM SERPL-SCNC: 4.5 MMOL/L (ref 3.5–5.1)
POTASSIUM SERPL-SCNC: 4.6 MMOL/L (ref 3.5–5.1)
POTASSIUM SERPL-SCNC: 4.8 MMOL/L (ref 3.5–5.1)
POTASSIUM SERPL-SCNC: 5.2 MMOL/L (ref 3.5–5.1)
PROT SERPL-MCNC: 4.4 G/DL (ref 6–8.4)
PROT SERPL-MCNC: 4.8 G/DL (ref 6–8.4)
PROT SERPL-MCNC: 4.8 G/DL (ref 6–8.4)
PROT SERPL-MCNC: 6.7 G/DL (ref 6–8.4)
PROTHROMBIN TIME: 12 SEC (ref 9–12.5)
PROTHROMBIN TIME: 12.1 SEC (ref 9–12.5)
PROTHROMBIN TIME: 13 SEC (ref 9–12.5)
RBC # BLD AUTO: 2.37 M/UL (ref 4.6–6.2)
RBC # BLD AUTO: 2.52 M/UL (ref 4.6–6.2)
RBC # BLD AUTO: 2.76 M/UL (ref 4.6–6.2)
RBC # BLD AUTO: 2.87 M/UL (ref 4.6–6.2)
RBC # BLD AUTO: 3.28 M/UL (ref 4.6–6.2)
SAMPLE: ABNORMAL
SAMPLE: NORMAL
SAMPLE: NORMAL
SARS-COV-2 RNA RESP QL NAA+PROBE: NOT DETECTED
SITE: ABNORMAL
SODIUM BLD-SCNC: 132 MMOL/L (ref 136–145)
SODIUM BLD-SCNC: 133 MMOL/L (ref 136–145)
SODIUM BLD-SCNC: 134 MMOL/L (ref 136–145)
SODIUM BLD-SCNC: 134 MMOL/L (ref 136–145)
SODIUM BLD-SCNC: 136 MMOL/L (ref 136–145)
SODIUM BLD-SCNC: 137 MMOL/L (ref 136–145)
SODIUM BLD-SCNC: 138 MMOL/L (ref 136–145)
SODIUM BLD-SCNC: 139 MMOL/L (ref 136–145)
SODIUM BLD-SCNC: 139 MMOL/L (ref 136–145)
SODIUM BLD-SCNC: 140 MMOL/L (ref 136–145)
SODIUM BLD-SCNC: 141 MMOL/L (ref 136–145)
SODIUM BLD-SCNC: 141 MMOL/L (ref 136–145)
SODIUM SERPL-SCNC: 135 MMOL/L (ref 136–145)
SODIUM SERPL-SCNC: 138 MMOL/L (ref 136–145)
SODIUM SERPL-SCNC: 140 MMOL/L (ref 136–145)
SODIUM SERPL-SCNC: 141 MMOL/L (ref 136–145)
SP02: 100
SPECIMEN OUTDATE: NORMAL
TRANS ERYTHROCYTES VOL PATIENT: NORMAL ML
UNIT NUMBER: NORMAL
VT: 490
VT: 520
WBC # BLD AUTO: 10.37 K/UL (ref 3.9–12.7)
WBC # BLD AUTO: 11.82 K/UL (ref 3.9–12.7)
WBC # BLD AUTO: 12.09 K/UL (ref 3.9–12.7)
WBC # BLD AUTO: 14.69 K/UL (ref 3.9–12.7)
WBC # BLD AUTO: 6.84 K/UL (ref 3.9–12.7)

## 2023-12-01 PROCEDURE — 93503 INSERT/PLACE HEART CATHETER: CPT | Mod: 59,,, | Performed by: ANESTHESIOLOGY

## 2023-12-01 PROCEDURE — 82803 BLOOD GASES ANY COMBINATION: CPT

## 2023-12-01 PROCEDURE — 86920 COMPATIBILITY TEST SPIN: CPT | Performed by: INTERNAL MEDICINE

## 2023-12-01 PROCEDURE — 27000175 HC ADULT BYPASS PUMP

## 2023-12-01 PROCEDURE — P9016 RBC LEUKOCYTES REDUCED: HCPCS | Performed by: THORACIC SURGERY (CARDIOTHORACIC VASCULAR SURGERY)

## 2023-12-01 PROCEDURE — 83605 ASSAY OF LACTIC ACID: CPT

## 2023-12-01 PROCEDURE — 27201041 HC RESERVOIR, CARDIOTOMY

## 2023-12-01 PROCEDURE — 85384 FIBRINOGEN ACTIVITY: CPT | Mod: 91 | Performed by: THORACIC SURGERY (CARDIOTHORACIC VASCULAR SURGERY)

## 2023-12-01 PROCEDURE — 83615 LACTATE (LD) (LDH) ENZYME: CPT | Performed by: STUDENT IN AN ORGANIZED HEALTH CARE EDUCATION/TRAINING PROGRAM

## 2023-12-01 PROCEDURE — P9016 RBC LEUKOCYTES REDUCED: HCPCS | Performed by: INTERNAL MEDICINE

## 2023-12-01 PROCEDURE — P9017 PLASMA 1 DONOR FRZ W/IN 8 HR: HCPCS

## 2023-12-01 PROCEDURE — 88307 TISSUE EXAM BY PATHOLOGIST: CPT | Mod: 26,,, | Performed by: PATHOLOGY

## 2023-12-01 PROCEDURE — 99499 UNLISTED E&M SERVICE: CPT | Mod: ,,, | Performed by: ANESTHESIOLOGY

## 2023-12-01 PROCEDURE — 0PC00ZZ EXTIRPATION OF MATTER FROM STERNUM, OPEN APPROACH: ICD-10-PCS | Performed by: THORACIC SURGERY (CARDIOTHORACIC VASCULAR SURGERY)

## 2023-12-01 PROCEDURE — 86920 COMPATIBILITY TEST SPIN: CPT | Performed by: THORACIC SURGERY (CARDIOTHORACIC VASCULAR SURGERY)

## 2023-12-01 PROCEDURE — 36000712 HC OR TIME LEV V 1ST 15 MIN: Performed by: THORACIC SURGERY (CARDIOTHORACIC VASCULAR SURGERY)

## 2023-12-01 PROCEDURE — 25000003 PHARM REV CODE 250: Performed by: THORACIC SURGERY (CARDIOTHORACIC VASCULAR SURGERY)

## 2023-12-01 PROCEDURE — 63600175 PHARM REV CODE 636 W HCPCS: Mod: NTX

## 2023-12-01 PROCEDURE — 99900035 HC TECH TIME PER 15 MIN (STAT)

## 2023-12-01 PROCEDURE — 37000009 HC ANESTHESIA EA ADD 15 MINS: Performed by: THORACIC SURGERY (CARDIOTHORACIC VASCULAR SURGERY)

## 2023-12-01 PROCEDURE — 33979 INSERT INTRACORPOREAL DEVICE: CPT | Mod: 22,51,, | Performed by: THORACIC SURGERY (CARDIOTHORACIC VASCULAR SURGERY)

## 2023-12-01 PROCEDURE — 85384 FIBRINOGEN ACTIVITY: CPT | Performed by: ANESTHESIOLOGY

## 2023-12-01 PROCEDURE — 84132 ASSAY OF SERUM POTASSIUM: CPT

## 2023-12-01 PROCEDURE — 86965 POOLING BLOOD PLATELETS: CPT | Performed by: ANESTHESIOLOGY

## 2023-12-01 PROCEDURE — 36592 COLLECT BLOOD FROM PICC: CPT

## 2023-12-01 PROCEDURE — 85014 HEMATOCRIT: CPT

## 2023-12-01 PROCEDURE — 88307 TISSUE EXAM BY PATHOLOGIST: CPT | Performed by: PATHOLOGY

## 2023-12-01 PROCEDURE — P9012 CRYOPRECIPITATE EACH UNIT: HCPCS | Performed by: ANESTHESIOLOGY

## 2023-12-01 PROCEDURE — 85730 THROMBOPLASTIN TIME PARTIAL: CPT | Performed by: STUDENT IN AN ORGANIZED HEALTH CARE EDUCATION/TRAINING PROGRAM

## 2023-12-01 PROCEDURE — 63600367 HC NITRIC OXIDE PER HOUR

## 2023-12-01 PROCEDURE — 33425 REPAIR OF MITRAL VALVE: CPT | Mod: 22,,, | Performed by: THORACIC SURGERY (CARDIOTHORACIC VASCULAR SURGERY)

## 2023-12-01 PROCEDURE — 27201037 HC PRESSURE MONITORING SET UP

## 2023-12-01 PROCEDURE — 85025 COMPLETE CBC W/AUTO DIFF WBC: CPT | Mod: 91 | Performed by: STUDENT IN AN ORGANIZED HEALTH CARE EDUCATION/TRAINING PROGRAM

## 2023-12-01 PROCEDURE — 25000003 PHARM REV CODE 250: Performed by: ANESTHESIOLOGY

## 2023-12-01 PROCEDURE — P9035 PLATELET PHERES LEUKOREDUCED: HCPCS | Performed by: ANESTHESIOLOGY

## 2023-12-01 PROCEDURE — 85610 PROTHROMBIN TIME: CPT | Mod: 91 | Performed by: STUDENT IN AN ORGANIZED HEALTH CARE EDUCATION/TRAINING PROGRAM

## 2023-12-01 PROCEDURE — 63600150 PHARM REV CODE 636

## 2023-12-01 PROCEDURE — 93312 ECHO TRANSESOPHAGEAL: CPT | Mod: 26,59,, | Performed by: ANESTHESIOLOGY

## 2023-12-01 PROCEDURE — 36556 CENTRAL LINE: ICD-10-PCS | Mod: 59,,, | Performed by: ANESTHESIOLOGY

## 2023-12-01 PROCEDURE — 27801475 HC HEARTMATE III IMPLANT KIT

## 2023-12-01 PROCEDURE — P9012 CRYOPRECIPITATE EACH UNIT: HCPCS | Performed by: THORACIC SURGERY (CARDIOTHORACIC VASCULAR SURGERY)

## 2023-12-01 PROCEDURE — P9017 PLASMA 1 DONOR FRZ W/IN 8 HR: HCPCS | Performed by: INTERNAL MEDICINE

## 2023-12-01 PROCEDURE — 27201423 OPTIME MED/SURG SUP & DEVICES STERILE SUPPLY: Performed by: THORACIC SURGERY (CARDIOTHORACIC VASCULAR SURGERY)

## 2023-12-01 PROCEDURE — 93750 INTERROGATION VAD IN PERSON: CPT | Mod: ,,, | Performed by: INTERNAL MEDICINE

## 2023-12-01 PROCEDURE — 63600175 PHARM REV CODE 636 W HCPCS: Performed by: ANESTHESIOLOGY

## 2023-12-01 PROCEDURE — 86920 COMPATIBILITY TEST SPIN: CPT | Performed by: ANESTHESIOLOGY

## 2023-12-01 PROCEDURE — 27202071 HC VOYAGER MOBILITY VEST

## 2023-12-01 PROCEDURE — 27201015 HC HEMO-CONCENTRATOR

## 2023-12-01 PROCEDURE — 84134 ASSAY OF PREALBUMIN: CPT | Performed by: STUDENT IN AN ORGANIZED HEALTH CARE EDUCATION/TRAINING PROGRAM

## 2023-12-01 PROCEDURE — 37799 UNLISTED PX VASCULAR SURGERY: CPT

## 2023-12-01 PROCEDURE — 93320 PR DOPPLER ECHO HEART,COMPLETE: ICD-10-PCS | Mod: 26,,, | Performed by: ANESTHESIOLOGY

## 2023-12-01 PROCEDURE — 94002 VENT MGMT INPAT INIT DAY: CPT

## 2023-12-01 PROCEDURE — 84100 ASSAY OF PHOSPHORUS: CPT | Mod: 91 | Performed by: STUDENT IN AN ORGANIZED HEALTH CARE EDUCATION/TRAINING PROGRAM

## 2023-12-01 PROCEDURE — 85025 COMPLETE CBC W/AUTO DIFF WBC: CPT | Mod: 91 | Performed by: ANESTHESIOLOGY

## 2023-12-01 PROCEDURE — 82330 ASSAY OF CALCIUM: CPT

## 2023-12-01 PROCEDURE — 27100026 HC SHUNT SENSOR, TERUMO

## 2023-12-01 PROCEDURE — 27100171 HC OXYGEN HIGH FLOW UP TO 24 HOURS

## 2023-12-01 PROCEDURE — 5A02216 ASSISTANCE WITH CARDIAC OUTPUT USING OTHER PUMP, CONTINUOUS: ICD-10-PCS | Performed by: THORACIC SURGERY (CARDIOTHORACIC VASCULAR SURGERY)

## 2023-12-01 PROCEDURE — 63600175 PHARM REV CODE 636 W HCPCS: Mod: JZ,JG

## 2023-12-01 PROCEDURE — 83735 ASSAY OF MAGNESIUM: CPT | Performed by: STUDENT IN AN ORGANIZED HEALTH CARE EDUCATION/TRAINING PROGRAM

## 2023-12-01 PROCEDURE — 36000713 HC OR TIME LEV V EA ADD 15 MIN: Performed by: THORACIC SURGERY (CARDIOTHORACIC VASCULAR SURGERY)

## 2023-12-01 PROCEDURE — 86901 BLOOD TYPING SEROLOGIC RH(D): CPT | Performed by: INTERNAL MEDICINE

## 2023-12-01 PROCEDURE — 02HA0QZ INSERTION OF IMPLANTABLE HEART ASSIST SYSTEM INTO HEART, OPEN APPROACH: ICD-10-PCS | Performed by: THORACIC SURGERY (CARDIOTHORACIC VASCULAR SURGERY)

## 2023-12-01 PROCEDURE — 33971 AORTIC CIRCULATION ASSIST: CPT | Mod: 51,,, | Performed by: THORACIC SURGERY (CARDIOTHORACIC VASCULAR SURGERY)

## 2023-12-01 PROCEDURE — 63600175 PHARM REV CODE 636 W HCPCS: Performed by: STUDENT IN AN ORGANIZED HEALTH CARE EDUCATION/TRAINING PROGRAM

## 2023-12-01 PROCEDURE — 80053 COMPREHEN METABOLIC PANEL: CPT | Mod: 91

## 2023-12-01 PROCEDURE — 85025 COMPLETE CBC W/AUTO DIFF WBC: CPT | Mod: 91

## 2023-12-01 PROCEDURE — P9045 ALBUMIN (HUMAN), 5%, 250 ML: HCPCS | Mod: JZ,JG

## 2023-12-01 PROCEDURE — P9035 PLATELET PHERES LEUKOREDUCED: HCPCS

## 2023-12-01 PROCEDURE — 85027 COMPLETE CBC AUTOMATED: CPT | Performed by: STUDENT IN AN ORGANIZED HEALTH CARE EDUCATION/TRAINING PROGRAM

## 2023-12-01 PROCEDURE — 25000003 PHARM REV CODE 250: Performed by: STUDENT IN AN ORGANIZED HEALTH CARE EDUCATION/TRAINING PROGRAM

## 2023-12-01 PROCEDURE — 27200953 HC CARDIOPLEGIA SYSTEM

## 2023-12-01 PROCEDURE — 94761 N-INVAS EAR/PLS OXIMETRY MLT: CPT | Mod: NTX

## 2023-12-01 PROCEDURE — 85002 BLEEDING TIME TEST: CPT

## 2023-12-01 PROCEDURE — 25000003 PHARM REV CODE 250

## 2023-12-01 PROCEDURE — 99233 SBSQ HOSP IP/OBS HIGH 50: CPT | Mod: ,,, | Performed by: INTERNAL MEDICINE

## 2023-12-01 PROCEDURE — 85730 THROMBOPLASTIN TIME PARTIAL: CPT | Mod: 91 | Performed by: STUDENT IN AN ORGANIZED HEALTH CARE EDUCATION/TRAINING PROGRAM

## 2023-12-01 PROCEDURE — 80053 COMPREHEN METABOLIC PANEL: CPT | Mod: 91 | Performed by: STUDENT IN AN ORGANIZED HEALTH CARE EDUCATION/TRAINING PROGRAM

## 2023-12-01 PROCEDURE — 63600175 PHARM REV CODE 636 W HCPCS

## 2023-12-01 PROCEDURE — 33530 CORONARY ARTERY BYPASS/REOP: CPT | Mod: ,,, | Performed by: THORACIC SURGERY (CARDIOTHORACIC VASCULAR SURGERY)

## 2023-12-01 PROCEDURE — P9016 RBC LEUKOCYTES REDUCED: HCPCS | Performed by: ANESTHESIOLOGY

## 2023-12-01 PROCEDURE — 85520 HEPARIN ASSAY: CPT

## 2023-12-01 PROCEDURE — 36556 INSERT NON-TUNNEL CV CATH: CPT | Mod: 59,,, | Performed by: ANESTHESIOLOGY

## 2023-12-01 PROCEDURE — 5A1221Z PERFORMANCE OF CARDIAC OUTPUT, CONTINUOUS: ICD-10-PCS | Performed by: THORACIC SURGERY (CARDIOTHORACIC VASCULAR SURGERY)

## 2023-12-01 PROCEDURE — 80053 COMPREHEN METABOLIC PANEL: CPT | Performed by: STUDENT IN AN ORGANIZED HEALTH CARE EDUCATION/TRAINING PROGRAM

## 2023-12-01 PROCEDURE — 93325 PR DOPPLER COLOR FLOW VELOCITY MAP: ICD-10-PCS | Mod: 26,,, | Performed by: ANESTHESIOLOGY

## 2023-12-01 PROCEDURE — 83735 ASSAY OF MAGNESIUM: CPT | Mod: 91 | Performed by: STUDENT IN AN ORGANIZED HEALTH CARE EDUCATION/TRAINING PROGRAM

## 2023-12-01 PROCEDURE — 93312 TEE: ICD-10-PCS | Mod: 26,59,, | Performed by: ANESTHESIOLOGY

## 2023-12-01 PROCEDURE — 93320 DOPPLER ECHO COMPLETE: CPT | Mod: 26,,, | Performed by: ANESTHESIOLOGY

## 2023-12-01 PROCEDURE — 27000202 HC IABP, ADD'L DAY: Mod: NTX

## 2023-12-01 PROCEDURE — 25000003 PHARM REV CODE 250: Mod: NTX

## 2023-12-01 PROCEDURE — D9220A PRA ANESTHESIA: Mod: ,,, | Performed by: ANESTHESIOLOGY

## 2023-12-01 PROCEDURE — 84100 ASSAY OF PHOSPHORUS: CPT

## 2023-12-01 PROCEDURE — 85384 FIBRINOGEN ACTIVITY: CPT | Mod: 91

## 2023-12-01 PROCEDURE — 27000191 HC C-V MONITORING

## 2023-12-01 PROCEDURE — 27202608 HC CANNULA, MISC

## 2023-12-01 PROCEDURE — C1729 CATH, DRAINAGE: HCPCS | Performed by: THORACIC SURGERY (CARDIOTHORACIC VASCULAR SURGERY)

## 2023-12-01 PROCEDURE — 86965 POOLING BLOOD PLATELETS: CPT | Performed by: THORACIC SURGERY (CARDIOTHORACIC VASCULAR SURGERY)

## 2023-12-01 PROCEDURE — 83735 ASSAY OF MAGNESIUM: CPT | Mod: 91

## 2023-12-01 PROCEDURE — D9220A PRA ANESTHESIA: ICD-10-PCS | Mod: ,,, | Performed by: ANESTHESIOLOGY

## 2023-12-01 PROCEDURE — 37000008 HC ANESTHESIA 1ST 15 MINUTES: Performed by: THORACIC SURGERY (CARDIOTHORACIC VASCULAR SURGERY)

## 2023-12-01 PROCEDURE — 93503 SWAN GANZ LINE: ICD-10-PCS | Mod: 59,,, | Performed by: ANESTHESIOLOGY

## 2023-12-01 PROCEDURE — 82565 ASSAY OF CREATININE: CPT

## 2023-12-01 PROCEDURE — 93325 DOPPLER ECHO COLOR FLOW MAPG: CPT | Mod: 26,,, | Performed by: ANESTHESIOLOGY

## 2023-12-01 PROCEDURE — 85730 THROMBOPLASTIN TIME PARTIAL: CPT | Mod: 91

## 2023-12-01 PROCEDURE — 25000003 PHARM REV CODE 250: Mod: NTX | Performed by: STUDENT IN AN ORGANIZED HEALTH CARE EDUCATION/TRAINING PROGRAM

## 2023-12-01 PROCEDURE — 34714 OPN FEM ART EXPOS CNDT CRTJ: CPT | Mod: RT,,, | Performed by: THORACIC SURGERY (CARDIOTHORACIC VASCULAR SURGERY)

## 2023-12-01 PROCEDURE — C1768 GRAFT, VASCULAR: HCPCS | Performed by: THORACIC SURGERY (CARDIOTHORACIC VASCULAR SURGERY)

## 2023-12-01 PROCEDURE — 02VG0ZZ RESTRICTION OF MITRAL VALVE, OPEN APPROACH: ICD-10-PCS | Performed by: THORACIC SURGERY (CARDIOTHORACIC VASCULAR SURGERY)

## 2023-12-01 PROCEDURE — 85007 BL SMEAR W/DIFF WBC COUNT: CPT | Performed by: STUDENT IN AN ORGANIZED HEALTH CARE EDUCATION/TRAINING PROGRAM

## 2023-12-01 PROCEDURE — 85025 COMPLETE CBC W/AUTO DIFF WBC: CPT | Performed by: STUDENT IN AN ORGANIZED HEALTH CARE EDUCATION/TRAINING PROGRAM

## 2023-12-01 PROCEDURE — 20000000 HC ICU ROOM

## 2023-12-01 PROCEDURE — 04QK0ZZ REPAIR RIGHT FEMORAL ARTERY, OPEN APPROACH: ICD-10-PCS | Performed by: THORACIC SURGERY (CARDIOTHORACIC VASCULAR SURGERY)

## 2023-12-01 PROCEDURE — 84295 ASSAY OF SERUM SODIUM: CPT

## 2023-12-01 PROCEDURE — 36430 TRANSFUSION BLD/BLD COMPNT: CPT

## 2023-12-01 PROCEDURE — 33390 VALVULOPLASTY AORTIC VALVE: CPT | Mod: 22,51,, | Performed by: THORACIC SURGERY (CARDIOTHORACIC VASCULAR SURGERY)

## 2023-12-01 PROCEDURE — 85610 PROTHROMBIN TIME: CPT

## 2023-12-01 DEVICE — BARD® PTFE FELT, 2.5 CM X 15.2 CM
Type: IMPLANTABLE DEVICE | Site: HEART | Status: FUNCTIONAL
Brand: BARD® PTFE FELT

## 2023-12-01 RX ORDER — HYDROMORPHONE HYDROCHLORIDE 1 MG/ML
0.5 INJECTION, SOLUTION INTRAMUSCULAR; INTRAVENOUS; SUBCUTANEOUS
Status: DISCONTINUED | OUTPATIENT
Start: 2023-12-01 | End: 2023-12-01

## 2023-12-01 RX ORDER — OXYCODONE HYDROCHLORIDE 10 MG/1
10 TABLET ORAL EVERY 4 HOURS PRN
Status: DISCONTINUED | OUTPATIENT
Start: 2023-12-01 | End: 2023-12-06

## 2023-12-01 RX ORDER — HYDROCODONE BITARTRATE AND ACETAMINOPHEN 500; 5 MG/1; MG/1
TABLET ORAL
Status: DISCONTINUED | OUTPATIENT
Start: 2023-12-01 | End: 2023-12-02

## 2023-12-01 RX ORDER — ALBUMIN HUMAN 50 G/1000ML
SOLUTION INTRAVENOUS
Status: COMPLETED
Start: 2023-12-01 | End: 2023-12-01

## 2023-12-01 RX ORDER — ADHESIVE BANDAGE
30 BANDAGE TOPICAL DAILY PRN
Status: DISCONTINUED | OUTPATIENT
Start: 2023-12-01 | End: 2023-12-21

## 2023-12-01 RX ORDER — DEXTROSE MONOHYDRATE, SODIUM CHLORIDE, AND POTASSIUM CHLORIDE 50; 2.98; 4.5 G/1000ML; G/1000ML; G/1000ML
INJECTION, SOLUTION INTRAVENOUS CONTINUOUS
Status: DISCONTINUED | OUTPATIENT
Start: 2023-12-01 | End: 2023-12-01

## 2023-12-01 RX ORDER — FENTANYL CITRATE 50 UG/ML
INJECTION, SOLUTION INTRAMUSCULAR; INTRAVENOUS
Status: DISCONTINUED | OUTPATIENT
Start: 2023-12-01 | End: 2023-12-01

## 2023-12-01 RX ORDER — RIFAMPIN 600 MG/10ML
INJECTION, POWDER, LYOPHILIZED, FOR SOLUTION INTRAVENOUS
Status: DISCONTINUED | OUTPATIENT
Start: 2023-12-01 | End: 2023-12-01 | Stop reason: HOSPADM

## 2023-12-01 RX ORDER — PROPOFOL 10 MG/ML
0-50 INJECTION, EMULSION INTRAVENOUS CONTINUOUS
Status: DISCONTINUED | OUTPATIENT
Start: 2023-12-01 | End: 2023-12-05

## 2023-12-01 RX ORDER — KETAMINE HCL IN 0.9 % NACL 50 MG/5 ML
SYRINGE (ML) INTRAVENOUS
Status: DISCONTINUED | OUTPATIENT
Start: 2023-12-01 | End: 2023-12-01

## 2023-12-01 RX ORDER — INSULIN ASPART 100 [IU]/ML
8 INJECTION, SOLUTION INTRAVENOUS; SUBCUTANEOUS
Status: DISCONTINUED | OUTPATIENT
Start: 2023-12-01 | End: 2023-12-01

## 2023-12-01 RX ORDER — FAMOTIDINE 10 MG/ML
20 INJECTION INTRAVENOUS DAILY
Status: DISCONTINUED | OUTPATIENT
Start: 2023-12-02 | End: 2023-12-04

## 2023-12-01 RX ORDER — ONDANSETRON 2 MG/ML
INJECTION INTRAMUSCULAR; INTRAVENOUS
Status: DISCONTINUED | OUTPATIENT
Start: 2023-12-01 | End: 2023-12-01

## 2023-12-01 RX ORDER — NICARDIPINE HYDROCHLORIDE 0.2 MG/ML
0-15 INJECTION INTRAVENOUS CONTINUOUS
Status: DISCONTINUED | OUTPATIENT
Start: 2023-12-01 | End: 2023-12-06

## 2023-12-01 RX ORDER — MIDAZOLAM HYDROCHLORIDE 1 MG/ML
INJECTION INTRAMUSCULAR; INTRAVENOUS
Status: DISCONTINUED | OUTPATIENT
Start: 2023-12-01 | End: 2023-12-01

## 2023-12-01 RX ORDER — FUROSEMIDE 10 MG/ML
40 INJECTION INTRAMUSCULAR; INTRAVENOUS ONCE
Status: COMPLETED | OUTPATIENT
Start: 2023-12-01 | End: 2023-12-01

## 2023-12-01 RX ORDER — OXYCODONE HYDROCHLORIDE 5 MG/1
5 TABLET ORAL EVERY 4 HOURS PRN
Status: DISCONTINUED | OUTPATIENT
Start: 2023-12-01 | End: 2023-12-06

## 2023-12-01 RX ORDER — INDOMETHACIN 25 MG/1
50 CAPSULE ORAL ONCE
Status: COMPLETED | OUTPATIENT
Start: 2023-12-01 | End: 2023-12-01

## 2023-12-01 RX ORDER — VASOPRESSIN 20 [USP'U]/ML
INJECTION, SOLUTION INTRAMUSCULAR; SUBCUTANEOUS
Status: DISCONTINUED | OUTPATIENT
Start: 2023-12-01 | End: 2023-12-01

## 2023-12-01 RX ORDER — FERROUS GLUCONATE 324(37.5)
324 TABLET ORAL
Status: DISCONTINUED | OUTPATIENT
Start: 2023-12-02 | End: 2023-12-04

## 2023-12-01 RX ORDER — ALBUTEROL SULFATE 2.5 MG/.5ML
2.5 SOLUTION RESPIRATORY (INHALATION) EVERY 4 HOURS PRN
Status: DISCONTINUED | OUTPATIENT
Start: 2023-12-01 | End: 2023-12-01

## 2023-12-01 RX ORDER — SODIUM CHLORIDE 0.9 % (FLUSH) 0.9 %
10 SYRINGE (ML) INJECTION
Status: DISCONTINUED | OUTPATIENT
Start: 2023-12-01 | End: 2023-12-01

## 2023-12-01 RX ORDER — HEPARIN SODIUM 1000 [USP'U]/ML
INJECTION, SOLUTION INTRAVENOUS; SUBCUTANEOUS
Status: DISCONTINUED | OUTPATIENT
Start: 2023-12-01 | End: 2023-12-01

## 2023-12-01 RX ORDER — HYDROMORPHONE HYDROCHLORIDE 1 MG/ML
0.5 INJECTION, SOLUTION INTRAMUSCULAR; INTRAVENOUS; SUBCUTANEOUS
Status: DISCONTINUED | OUTPATIENT
Start: 2023-12-01 | End: 2023-12-03

## 2023-12-01 RX ORDER — PROTAMINE SULFATE 10 MG/ML
INJECTION, SOLUTION INTRAVENOUS
Status: DISCONTINUED | OUTPATIENT
Start: 2023-12-01 | End: 2023-12-01

## 2023-12-01 RX ORDER — SODIUM CHLORIDE 9 MG/ML
INJECTION, SOLUTION INTRAVENOUS CONTINUOUS
Status: DISCONTINUED | OUTPATIENT
Start: 2023-12-01 | End: 2023-12-01

## 2023-12-01 RX ORDER — DOCUSATE SODIUM 100 MG/1
200 CAPSULE, LIQUID FILLED ORAL NIGHTLY
Status: DISCONTINUED | OUTPATIENT
Start: 2023-12-01 | End: 2023-12-04

## 2023-12-01 RX ORDER — ALBUTEROL SULFATE 2.5 MG/.5ML
2.5 SOLUTION RESPIRATORY (INHALATION) EVERY 4 HOURS PRN
Status: DISCONTINUED | OUTPATIENT
Start: 2023-12-01 | End: 2023-12-28

## 2023-12-01 RX ORDER — FAMOTIDINE 10 MG/ML
20 INJECTION INTRAVENOUS 2 TIMES DAILY
Status: DISCONTINUED | OUTPATIENT
Start: 2023-12-01 | End: 2023-12-01

## 2023-12-01 RX ORDER — POTASSIUM CHLORIDE 14.9 MG/ML
40 INJECTION INTRAVENOUS
Status: DISCONTINUED | OUTPATIENT
Start: 2023-12-01 | End: 2023-12-22

## 2023-12-01 RX ORDER — ASPIRIN 325 MG
325 TABLET ORAL DAILY
Status: DISCONTINUED | OUTPATIENT
Start: 2023-12-01 | End: 2023-12-01

## 2023-12-01 RX ORDER — MAGNESIUM SULFATE HEPTAHYDRATE 40 MG/ML
2 INJECTION, SOLUTION INTRAVENOUS
Status: DISCONTINUED | OUTPATIENT
Start: 2023-12-01 | End: 2023-12-05

## 2023-12-01 RX ORDER — ALBUMIN HUMAN 50 G/1000ML
25 SOLUTION INTRAVENOUS
Status: DISCONTINUED | OUTPATIENT
Start: 2023-12-01 | End: 2023-12-12

## 2023-12-01 RX ORDER — FENTANYL CITRATE 50 UG/ML
25 INJECTION, SOLUTION INTRAMUSCULAR; INTRAVENOUS
Status: DISCONTINUED | OUTPATIENT
Start: 2023-12-01 | End: 2023-12-06

## 2023-12-01 RX ORDER — LIDOCAINE HYDROCHLORIDE 20 MG/ML
INJECTION, SOLUTION EPIDURAL; INFILTRATION; INTRACAUDAL; PERINEURAL
Status: DISCONTINUED | OUTPATIENT
Start: 2023-12-01 | End: 2023-12-01

## 2023-12-01 RX ORDER — TRANEXAMIC ACID 100 MG/ML
INJECTION, SOLUTION INTRAVENOUS
Status: DISCONTINUED | OUTPATIENT
Start: 2023-12-01 | End: 2023-12-01

## 2023-12-01 RX ORDER — ALBUMIN HUMAN 50 G/1000ML
25 SOLUTION INTRAVENOUS ONCE
Status: COMPLETED | OUTPATIENT
Start: 2023-12-01 | End: 2023-12-01

## 2023-12-01 RX ORDER — FENTANYL CITRATE 50 UG/ML
25 INJECTION, SOLUTION INTRAMUSCULAR; INTRAVENOUS
Status: ACTIVE | OUTPATIENT
Start: 2023-12-01 | End: 2023-12-01

## 2023-12-01 RX ORDER — ROCURONIUM BROMIDE 10 MG/ML
INJECTION, SOLUTION INTRAVENOUS
Status: DISCONTINUED | OUTPATIENT
Start: 2023-12-01 | End: 2023-12-01

## 2023-12-01 RX ORDER — FUROSEMIDE 10 MG/ML
INJECTION INTRAMUSCULAR; INTRAVENOUS
Status: DISCONTINUED | OUTPATIENT
Start: 2023-12-01 | End: 2023-12-01

## 2023-12-01 RX ORDER — HYDROMORPHONE HYDROCHLORIDE 2 MG/ML
INJECTION, SOLUTION INTRAMUSCULAR; INTRAVENOUS; SUBCUTANEOUS
Status: DISCONTINUED | OUTPATIENT
Start: 2023-12-01 | End: 2023-12-01

## 2023-12-01 RX ORDER — MUPIROCIN 20 MG/G
OINTMENT TOPICAL 2 TIMES DAILY
Status: COMPLETED | OUTPATIENT
Start: 2023-12-01 | End: 2023-12-06

## 2023-12-01 RX ORDER — HYDROCODONE BITARTRATE AND ACETAMINOPHEN 500; 5 MG/1; MG/1
TABLET ORAL
Status: DISCONTINUED | OUTPATIENT
Start: 2023-12-01 | End: 2023-12-01

## 2023-12-01 RX ORDER — BISACODYL 10 MG/1
10 SUPPOSITORY RECTAL DAILY PRN
Status: DISCONTINUED | OUTPATIENT
Start: 2023-12-01 | End: 2023-12-02

## 2023-12-01 RX ORDER — NOREPINEPHRINE BITARTRATE/D5W 4MG/250ML
0-3 PLASTIC BAG, INJECTION (ML) INTRAVENOUS CONTINUOUS
Status: DISCONTINUED | OUTPATIENT
Start: 2023-12-01 | End: 2023-12-02

## 2023-12-01 RX ADMIN — HYDROMORPHONE HYDROCHLORIDE 0.5 MG: 0.5 INJECTION, SOLUTION INTRAMUSCULAR; INTRAVENOUS; SUBCUTANEOUS at 06:12

## 2023-12-01 RX ADMIN — PROTAMINE SULFATE 250 MG: 10 INJECTION, SOLUTION INTRAVENOUS at 01:12

## 2023-12-01 RX ADMIN — VASOPRESSIN 3 UNITS: 20 INJECTION INTRAVENOUS at 01:12

## 2023-12-01 RX ADMIN — PROPOFOL 40 MCG/KG/MIN: 10 INJECTION, EMULSION INTRAVENOUS at 07:12

## 2023-12-01 RX ADMIN — EPINEPHRINE 0.05 MCG/KG/MIN: 1 INJECTION INTRAMUSCULAR; INTRAVENOUS; SUBCUTANEOUS at 07:12

## 2023-12-01 RX ADMIN — FUROSEMIDE 40 MG: 10 INJECTION, SOLUTION INTRAMUSCULAR; INTRAVENOUS at 01:12

## 2023-12-01 RX ADMIN — SODIUM BICARBONATE 50 MEQ: 84 INJECTION, SOLUTION INTRAVENOUS at 05:12

## 2023-12-01 RX ADMIN — VASOPRESSIN 0.04 UNITS/MIN: 20 INJECTION INTRAVENOUS at 07:12

## 2023-12-01 RX ADMIN — ALBUMIN (HUMAN) 25 G: 12.5 SOLUTION INTRAVENOUS at 03:12

## 2023-12-01 RX ADMIN — HEPARIN SODIUM 31000 UNITS: 1000 INJECTION, SOLUTION INTRAVENOUS; SUBCUTANEOUS at 10:12

## 2023-12-01 RX ADMIN — MIDAZOLAM HYDROCHLORIDE 2 MG: 1 INJECTION INTRAMUSCULAR; INTRAVENOUS at 07:12

## 2023-12-01 RX ADMIN — VASOPRESSIN 3 UNITS: 20 INJECTION INTRAVENOUS at 10:12

## 2023-12-01 RX ADMIN — VANCOMYCIN HYDROCHLORIDE 1000 MG: 1 INJECTION, POWDER, LYOPHILIZED, FOR SOLUTION INTRAVENOUS at 08:12

## 2023-12-01 RX ADMIN — CEFEPIME 2 G: 2 INJECTION, POWDER, FOR SOLUTION INTRAVENOUS at 08:12

## 2023-12-01 RX ADMIN — EPINEPHRINE 0.1 MCG/KG/MIN: 1 INJECTION INTRAMUSCULAR; INTRAVENOUS; SUBCUTANEOUS at 07:12

## 2023-12-01 RX ADMIN — NOREPINEPHRINE BITARTRATE 0.02 MCG/KG/MIN: 1 INJECTION, SOLUTION, CONCENTRATE INTRAVENOUS at 09:12

## 2023-12-01 RX ADMIN — FENTANYL CITRATE 25 MCG: 50 INJECTION INTRAMUSCULAR; INTRAVENOUS at 10:12

## 2023-12-01 RX ADMIN — HYDROMORPHONE HYDROCHLORIDE 0.5 MG: 2 INJECTION INTRAMUSCULAR; INTRAVENOUS; SUBCUTANEOUS at 01:12

## 2023-12-01 RX ADMIN — ROCURONIUM BROMIDE 30 MG: 10 INJECTION INTRAVENOUS at 08:12

## 2023-12-01 RX ADMIN — CALCIUM CHLORIDE 1 G: 100 INJECTION, SOLUTION INTRAVENOUS at 01:12

## 2023-12-01 RX ADMIN — Medication 50 MG: at 07:12

## 2023-12-01 RX ADMIN — PANTOPRAZOLE SODIUM 40 MG: 40 TABLET, DELAYED RELEASE ORAL at 06:12

## 2023-12-01 RX ADMIN — DOCUSATE SODIUM 200 MG: 100 CAPSULE, LIQUID FILLED ORAL at 09:12

## 2023-12-01 RX ADMIN — ROCURONIUM BROMIDE 50 MG: 10 INJECTION INTRAVENOUS at 10:12

## 2023-12-01 RX ADMIN — LIDOCAINE HYDROCHLORIDE 100 MG: 20 INJECTION, SOLUTION EPIDURAL; INFILTRATION; INTRACAUDAL at 10:12

## 2023-12-01 RX ADMIN — SODIUM CHLORIDE: 9 INJECTION, SOLUTION INTRAVENOUS at 05:12

## 2023-12-01 RX ADMIN — HEPARIN SODIUM 5000 UNITS: 1000 INJECTION, SOLUTION INTRAVENOUS; SUBCUTANEOUS at 10:12

## 2023-12-01 RX ADMIN — ROCURONIUM BROMIDE 30 MG: 10 INJECTION INTRAVENOUS at 11:12

## 2023-12-01 RX ADMIN — FENTANYL CITRATE 25 MCG: 50 INJECTION INTRAMUSCULAR; INTRAVENOUS at 11:12

## 2023-12-01 RX ADMIN — CEFEPIME 2 G: 2 INJECTION, POWDER, FOR SOLUTION INTRAVENOUS at 12:12

## 2023-12-01 RX ADMIN — TRANEXAMIC ACID 800 MG: 100 INJECTION, SOLUTION INTRAVENOUS at 08:12

## 2023-12-01 RX ADMIN — CEFEPIME 1 G: 1 INJECTION, POWDER, FOR SOLUTION INTRAMUSCULAR; INTRAVENOUS at 07:12

## 2023-12-01 RX ADMIN — NICARDIPINE HYDROCHLORIDE 1 MG/HR: 0.2 INJECTION, SOLUTION INTRAVENOUS at 10:12

## 2023-12-01 RX ADMIN — MUPIROCIN: 20 OINTMENT TOPICAL at 09:12

## 2023-12-01 RX ADMIN — FUROSEMIDE 40 MG: 10 INJECTION, SOLUTION INTRAVENOUS at 06:12

## 2023-12-01 RX ADMIN — FENTANYL CITRATE 200 MCG: 50 INJECTION, SOLUTION INTRAMUSCULAR; INTRAVENOUS at 07:12

## 2023-12-01 RX ADMIN — SODIUM CHLORIDE 5 MG/HR: 9 INJECTION, SOLUTION INTRAVENOUS at 06:12

## 2023-12-01 RX ADMIN — INSULIN HUMAN 3 UNITS/HR: 1 INJECTION, SOLUTION INTRAVENOUS at 09:12

## 2023-12-01 RX ADMIN — ROCURONIUM BROMIDE 70 MG: 10 INJECTION INTRAVENOUS at 07:12

## 2023-12-01 RX ADMIN — ALBUMIN HUMAN 25 G: 50 SOLUTION INTRAVENOUS at 03:12

## 2023-12-01 RX ADMIN — SODIUM CHLORIDE 5 UNITS/HR: 9 INJECTION, SOLUTION INTRAVENOUS at 11:12

## 2023-12-01 RX ADMIN — DOBUTAMINE IN DEXTROSE 5 MCG/KG/MIN: 400 INJECTION, SOLUTION INTRAVENOUS at 10:12

## 2023-12-01 NOTE — PLAN OF CARE
Cardiac ICU Care Plan    No acute events over night  CVP 6, 3, 5  Svo2 53  Patient prepped for LVAD surgery today.     POC reviewed with Radha Abbott and family. Questions and concerns addressed. No acute events today. Pt progressing toward goals. Will continue to monitor. See below and flowsheets for full assessment and VS info.       Neuro:  [place] Coma Scale  Best Eye Response: 4-->(E4) spontaneous  Best Motor Response: 6-->(M6) obeys commands  Best Verbal Response: 5-->(V5) oriented  Michelle Coma Scale Score: 15  Assessment Qualifiers: patient not sedated/intubated  Pupil PERRLA: no    24 hr Temp:  [98 °F (36.7 °C)-99.4 °F (37.4 °C)]      CV:  Rhythm: normal sinus rhythm   DVT prophylaxis: VTE Required Core Measure: Pharmacological prophylaxis initiated/maintained    CVP (mean): 5 mmHg (12/01/23 0301)    [REMOVED] Pulmonary Artery Catheter Assessment  11/20/23 1330 Internal Jugular Left-Current Insertion Depth (cm): 63.5 cm (11/29/23 0700)  PAP: 62/28 (11/29/23 1100)  SVO2 (%): 53 % (11/30/23 1901)    IABP Mode: Semi-auto  IABP Rate: 1:1  IABP Trigger: ECG  IABP Augmented Diastolic Pressure: 100          Pulses  Right Radial Pulse: 2+ (normal)  Left Radial Pulse: 2+ (normal)  Right Dorsalis Pedis Pulse: 1+ (weak)  Left Dorsalis Pedis Pulse: 1+ (weak)  Right Posterior Tibial Pulse: 1+ (weak)  Left Posterior Tibial Pulse: 1+ (weak)    Resp:  Flow (L/min): 4  Oxygen Concentration (%): 36    GI/:  GI prophylaxis: yes  Diet/Nutrition Received: consistent carb/diabetic diet, restrict fluids (2000 cc fr)  Last Bowel Movement: 11/30/23  Voiding Characteristics: external catheter, voids spontaneously without difficulty   Intake/Output Summary (Last 24 hours) at 12/1/2023 0436  Last data filed at 12/1/2023 0401  Gross per 24 hour   Intake 1739.1 ml   Output 1025 ml   Net 714.1 ml        Nutritional Supplement Intake: Quantity 0, Type: Boost    Labs/Accuchecks:  Recent Labs   Lab 11/29/23  0400 11/30/23  8044  "12/01/23  0325   WBC 6.82 6.26 6.84   RBC 3.12* 2.98* 2.87*   HGB 8.7* 8.4* 7.9*   HCT 27.4* 26.2* 24.9*    281 280      Recent Labs   Lab 11/29/23  2242 11/30/23  0413 12/01/23  0325   INR  --  1.1  --    APTT 54.3* 48.8*  48.8* 50.3*      Recent Labs     12/01/23  0325   *   K 4.5   CO2 25      BUN 52*   CREATININE 2.1*   ALKPHOS 74   ALT 26   AST 31   BILITOT 0.4     No results for input(s): "CPK", "CPKMB", "MB", "TROPONINI" in the last 168 hours.   Recent Labs     11/30/23  0750 11/30/23  1520 11/30/23 2010   PH 7.434 7.439 7.430   PCO2 39.2 38.6 37.2   PO2 26* 26* 27*   HCO3 26.2 26.2 24.7   POCSATURATED 49 50 53   BE 2 2 0       Electrolytes: No replacement orders  Accuchecks: ACHS    Gtts/LDAs:   sodium chloride 0.9%      sodium chloride 0.9% 5 mL/hr at 12/01/23 0401    DOBUTamine 5 mcg/kg/min (12/01/23 0401)    heparin (porcine) in D5W 16 Units/kg/hr (12/01/23 0401)    sodium chloride 0.9% 250 mL/hr at 11/20/23 2002       Lines/Drains/Airways       Central Venous Catheter Line  Duration             Percutaneous Central Line Insertion/Assessment - Triple Lumen  11/29/23 1543 Internal Jugular Right 1 day              Drain  Duration             Male External Urinary Catheter 11/26/23 0628 4 days              Line  Duration                  IABP 11/21/23 1048 7.5 Fr. 40 mL 9 days              Peripheral Intravenous Line  Duration                  Peripheral IV - Single Lumen 11/29/23 1100 20 G Anterior;Left Forearm 1 day         Peripheral IV - Single Lumen 11/29/23 1100 20 G Anterior;Left Forearm 1 day                    Skin/Wounds  Bathing/Skin Care: back care;bath, complete;dressed/undressed;electrode patches/site rotation;shampoo;shaved face (12/01/23 0230)  Wounds: No  Wound care consulted: No     Problem: Adult Inpatient Plan of Care  Goal: Plan of Care Review  Outcome: Ongoing, Progressing     "

## 2023-12-01 NOTE — ADDENDUM NOTE
Addendum  created 12/01/23 1557 by Doc Mosquera, DO    Child order released for a procedure order, Clinical Note Signed, Intraprocedure Blocks edited, Intraprocedure Event edited, LDA created via procedure documentation, LDA updated via procedure documentation, SmartForm saved

## 2023-12-01 NOTE — PROGRESS NOTES
Pt out of the Surgery.    Cardiac device reprogramming  for AICD    ICD detection and tachy therapies enabled at bedside       Pt's nurse notified of changes.

## 2023-12-01 NOTE — HPI
Radha Abbott is a 63 y.o. man with PMHx significant for CAD s/p 3V CABG in 2009 now with EF 10-15%, grade 3 diastolic dysfunction with ICD in place, normal RV function, moderate MR, PASP 41 on home , admitted for exacerbation of heart failure. Other co morbidities include uncontrolled DM2, HTN, HLD, hx of vfib taking home amio, CKD (baseline Cr ~2.5).     The patient presents to the SICU s/p LVAD re-do sternotomy with Dr. Washington on 12/1/2023. On admission, he is intubated, sedated with propofol, with an open chest, and in stable condition. Inotropic and vasopressor requirements upon admission are 0.1 mcg/kg/min of epinephrine and 10 of giapreza (levo and vaso off) to maintain blood pressure at a MAP 70-80. Central access includes a RIJ slick with swan, LIJ trialysis, arterial access includes a left radial arterial line. LVAD RMP 4800 and flows at 2.5-2.8. 2 mediastinal chest tubes with moderate bloody output.     Intraoperatively, they received 1.5L of crystalloid, 500c albumin, 7u PRBCs, 2u FFP, 1u platelets, and 10u cryoprecipitate. Urine output intraoperatively was 700cc. Hung 500cc albumin at admission.

## 2023-12-01 NOTE — NURSING
1545: Pt transported to SICU [address] with portable telemetry and portable vent . Pt connected to ICU monitor Ventilator. Dr Key and José Pryor called to bedside for patient arrival. Report received from Anesthesia. New orders received and implemented. Labs sent. Pt arrived on the following gtts: epi @ 0.1 mcg/kg/min, dobut @ 5mcg/kg/min, levo @ 0.04 mcg/kg/min, vaso @ 0.04 units/hr, propofol @ 50 mcg/kg/min, insulin @ 10 u/hr and txa. While anesthesia and care team still at bedside, pt with low flow alarms, flow down to 2.2 and patient hypertensive. Pressors adjusted per anesthesia, orders received to administer albumin, see MAR. Flows responded to decreased MAP and albumin administration.     1605: Dr Brizuela and Dr Key @ bedside, aware of current chest tube output, see flowsheets. Also aware that LE pulses unable to be doppler-ed, lore lewis applied and will reassess pulses once feet have been warmed.

## 2023-12-01 NOTE — ANESTHESIA PROCEDURE NOTES
Central Line    Diagnosis: Aortic Stenosis  Patient location during procedure: done in OR    Staffing  Authorizing Provider: Ebony Oliveira MD  Performing Provider: Doc Mosquera DO    Staffing  Performed by: Doc Mosquera DO  Authorized by: Ebony Oliveira MD    Preanesthetic Checklist  Completed: patient identified, IV checked, site marked, risks and benefits discussed, surgical consent, monitors and equipment checked, pre-op evaluation, timeout performed and anesthesia consent given  Indication   Indication: hemodynamic monitoring, vascular access     Anesthesia   local infiltration    Central Line   Skin Prep: skin prepped with ChloraPrep, skin prep agent completely dried prior to procedure  Sterile Barriers Followed: Yes    All five maximal barriers used- gloves, gown, cap, mask, and large sterile sheet    hand hygiene performed prior to central venous catheter insertion  Location: right internal jugular.   Catheter type: introducer  Catheter Size: 9 Fr  Ultrasound: vascular probe with ultrasound   Vessel Caliber: patent  Needle advanced into vessel with real time Ultrasound guidance.  Image recorded and saved.  sterile gel and probe cover used in ultrasound-guided central venous catheter insertion   Manometry: Venous cannualation confirmed by visual estimation of blood vessel pressure using manometry.  Insertion Attempts: 1   Securement:line sutured, chlorhexidine patch, sterile dressing applied and blood return through all ports    Post-Procedure    Adverse Events:none      Guidewire Guidewire removed intact. Guidewire removed intact, verified with nurse.

## 2023-12-01 NOTE — ASSESSMENT & PLAN NOTE
Neuro/Psych:   -- Sedation: propofol 50mcg/kg/min  -- Pain:    -- fentanyl PRN, dilaudid 0.5 q1 PRN while intubated, Oxy PRN             Cardiac:   -- S/P redo sternotomy LVAD with Dr. Washington on 12/01, chest remaining open  -- Plans for chest closure pending clinical progression on Monday   -- MAP Goal: 70-80, closer to 70  -- Cleviprex/Cardene PRN  -- Pressors: epi 0.1, shaji 10   -- levo and vaso off  -- Anti-HTNs: Will start when appropriate  -- Rhythm: NSR  -- amiodarone 400mg daily  -- LVAD: RPM 4800, flow 2.5-2.8  -- Beta blocker: Will start when appropriate  -- Statin: Atorvastatin 40 mg QD**  -- No ASA at this time      Pulmonary:   -- Goal SpO2 >92%  -- Intubated  -- minimal vent settings  -- Chest Tubes x 2 (2 Meds): high volume bloody output, monitor closely and replace losses  -- q1 ABGs and lactates      Renal:  -- Trend BUN/Cr   -- Maintain Hampton, record strict Is/Os  -- adequate UOP during case, monitor closely     Recent Labs   Lab 11/30/23  0413 11/30/23  1530 12/01/23  0325   BUN 60* 58* 52*   CREATININE 2.1* 2.3* 2.1*         FEN / GI:   -- Daily CMP, PRN K/Mag/Phos per protocol   -- Replace electrolytes as needed  -- Nutrition: NPO  -- OG tube ok for meds  -- Bowel Regimen: Miralax, docusate      ID:   -- Afebrile  -- WBC stable  -- Abx: cefepime. NO vanc  -- vanc levels tomorrow    Recent Labs   Lab 11/29/23  0400 11/30/23  0413 12/01/23  0325   WBC 6.82 6.26 6.84         Heme/Onc:   -- Hgb 8.7 pre-operatively  -- received 7RBC, 2 FFP, 1 platelet, 10 cry  -- q4 CBC and coags  -- q4 fibrinogen - keep fibrinogen >300  -- holding ASA    Recent Labs   Lab 11/29/23  0400 11/29/23  1626 11/29/23  2242 11/30/23  0413 12/01/23  0325   HGB 8.7*  --   --  8.4* 7.9*     --   --  281 280   APTT 53.7*   < > 54.3* 48.8*  48.8* 50.3*   INR  --   --   --  1.1  --     < > = values in this interval not displayed.         Endocrine:   -- CTS Goal -140  -- HgbA1c: 7.6  -- Endocrinology consulted for  insulin management      PPx:   Feeding: NPO  Analgesia/Sedation: fent, dilaudid / Prop  Thromboembolic Prevention: SCDs  HOB >30: Yes  Stress Ulcer: pantoprazole   Glucose Control: Yes, insulin management per Endocrinology  Bowel reg:   Invasive Lines/Drains/Airway:   ETT  OGT  Left radial arterial line  RIJ mac w/ slick + swan   LIJ quad CVC  Hampton  Chest Tubes: 2 Mediastinal  LVAD     Dispo/Code Status/Palliative:     - Continue SICU Care    - Full Code  .

## 2023-12-01 NOTE — PROGRESS NOTES
12/01/2023  [place]    Current provider:  Brayan Ocampo MD    Device interrogation:       No data to display                   Rounded on Ledric Abbott to ensure all mechanical assist device settings (IABP or VAD) were appropriate and all parameters were within limits.  I was able to ensure all back up equipment was present, the staff had no issues, and the Perfusion Department daily rounding was complete.      For implantable VADs: Interrogation of Ventricular assist device was performed with analysis of device parameters and review of device function. I have personally reviewed the interrogation findings and agree with findings as stated.     In emergency, the nursing units have been notified to contact the perfusion department either by:  Calling [phone] from 630am to 4pm Mon thru Fri, utilizing the On-Call Finder functionality of Epic and searching for Perfusion, or by contacting the hospital  from 4pm to 630am and on weekends and asking to speak with the perfusionist on call.    11:38 AM

## 2023-12-01 NOTE — PROGRESS NOTES
Pharmacist Renal Dose Adjustment Note    Radha Abbott is a 63 y.o. male being treated with the medication famotidine    Patient Data:    Vital Signs (Most Recent):  Temp: 97.9 °F (36.6 °C) (12/01/23 1545)  Pulse: 86 (12/01/23 1545)  Resp: 20 (12/01/23 1545)  BP: (!) 109/56 (12/01/23 0601)  SpO2: 100 % (12/01/23 0601) Vital Signs (72h Range):  Temp:  [97.9 °F (36.6 °C)-99.4 °F (37.4 °C)]   Pulse:  [82-98]   Resp:  [10-27]   BP: (100-155)/(51-87)   SpO2:  [96 %-100 %]   Arterial Line BP: (85-95)/(63-69)      Recent Labs   Lab 11/30/23  0413 11/30/23  1530 12/01/23  0325   CREATININE 2.1* 2.3* 2.1*     Serum creatinine: 2.1 mg/dL (H) 12/01/23 0325  Estimated creatinine clearance: 40.5 mL/min (A)    Medication:famotidine dose: 20 mg frequency BID will be changed to medication:famotidine dose:20 mg frequency:daily    Pharmacist's Name: Parviz Jarrell

## 2023-12-01 NOTE — TRANSFER OF CARE
Anesthesia Transfer of Care Note    Patient: Radha Abbott    Procedure(s) Performed: Procedure(s) (LRB):  INSERTION-LEFT VENTRICULAR ASSIST DEVICE (Left)  STERNOTOMY, REDO (N/A)  VALVULOPLASTY, MITRAL VALVE (N/A)  LYSIS, ADHESIONS  REPAIR, AORTIC VALVE (N/A)  CLOSURE, TEMPORARY (N/A)  ANGIOPLASTY-VENOUS ARTERY, RIGHT FEMORAL (Right)  REPAIR, ANEURYSM, ARTERY, FEMORAL (Right)    Patient location: ICU    Anesthesia Type: general    Transport from OR: Transported from OR intubated on 100% O2  with adequate ventilation controlled by transport ventilator. Continuous ECG monitoring in transport. Continuous SpO2 monitoring in transport. Continuos invasive BP monitoring in transport. Continuous PA pressure monitoring in transport    Post pain: adequate analgesia    Post assessment: no apparent anesthetic complications    Post vital signs: stable    Level of consciousness: sedated    Nausea/Vomiting: no nausea/vomiting    Complications: none    Transfer of care protocol was followed      Last vitals: Visit Vitals  BP (!) 109/56   Pulse 86   Temp 37.2 °C (99 °F) (Oral)   Resp 20   Ht 6' (1.829 m)   Wt 77.5 kg (170 lb 13.7 oz)   SpO2 100%   BMI 23.17 kg/m²

## 2023-12-01 NOTE — PROGRESS NOTES
12/01/2023  Perfusionist:  Deni Frye CCP, LP  Surgeon(s) and Role:     * Yuri Washington MD - Primary  Anesthesiologist:  Arely Patiño MD  Primary Perfusionist:: Deni Frye CCP, LP    Past Medical History:   Diagnosis Date    CAD (coronary artery disease)     Diabetes mellitus     HFrEF (heart failure with reduced ejection fraction)     ICD (implantable cardioverter-defibrillator) in place     MI, old        For implantable VADs  Pump assembled and wet-tested by:Oswaldo Soni CCP, LP  Pump SN: [id number]  Primary controller: [facility] [id number]  Backup controller: [facility] [place]-[id number]  For Heartmate3 devices: The VAD was started in extended alarm silence at 3000 RPMs per  recommendation.  Initially at 3000 RPMs, the PI was 1.9 and the power was 1.0 while flow did not register.  Note the VAD was started while the patient was still on full cardiopulmonary bypass.  As the bypass machine was weaned by the primary perfusionist, the VAD speed was changed multiple times by Deni Frye under the direction of Surgeon(s) and Role:     * Yuri Washington MD - Primary and Arely Patiño MD utilizing MOY guidance to assess LV and/or RV function, as well as ventricular filling/emptying.  Those changes occurred rapidly over the first few minutes of weaning from bypass and conversion to full VAD support, and due to the extremely fluid nature of this process, those rapid changes are not documented here.  The initial VAD parameters once stable are listed in the table below:    Initial VAD parameters at OR exit:  Fixed speed (RPMs) Low speed (RPMs) Flow (LPMs) PI Power (w) Extended alarm silence cancelled?   4700 4300 2.5 6.4 2.9 [x]       At the end of the procedure, the current device settings were verified to be accurate.  The patient was transported post operatively with back-up/emergency equipment that was delivered to the patient's room and verified by the bedside nurse, and report was given.  I was  available by phone for management and troubleshooting concerns by the unit staff.  There were no issues.    2:47 PM

## 2023-12-01 NOTE — PLAN OF CARE
HTS Hemodynamics:   Parameter   at 20:00   CVP 6   SvO2 53   CO  5.2   CI 2.6   SVR 1084   MAP 77     Current Heart Failure Medications:  - Dobutamine 5 mcg/kg/min    Current Mechanical Circulatory Support:  IABP 1:1 on heparin     UOP:    Intake/Output Summary (Last 24 hours) at 11/30/2023 1817  Last data filed at 11/30/2023 1800  Gross per 24 hour   Intake 1280.51 ml   Output 1075 ml   Net 205.51 ml     Assessment/Plan:  - Plan was discussed with on-call attending  - NPO at midnight for LVAD placement tomorrow    Reginald Garcia MD  Cardiovascular Disease PGY-V  [facility]

## 2023-12-01 NOTE — ANESTHESIA PROCEDURE NOTES
Intubation    Date/Time: 12/1/2023 7:23 AM    Performed by: Doc Mosquera DO  Authorized by: Ebony Oliveira MD    Intubation:     Induction:  Rapid sequence induction    Intubated:  Postinduction    Mask Ventilation:  Easy mask    Attempts:  1    Attempted By:  Resident anesthesiologist    Method of Intubation:  Direct    Blade:  Mixon 2    Laryngeal View Grade: Grade I - full view of cords      Difficult Airway Encountered?: No      Complications:  None    Airway Device:  Oral endotracheal tube    Airway Device Size:  7.5    Style/Cuff Inflation:  Cuffed (inflated to minimal occlusive pressure)    Secured at:  The teeth    Placement Verified By:  Capnometry    Complicating Factors:  None    Findings Post-Intubation:  Atraumatic/condition of teeth unchanged

## 2023-12-01 NOTE — CARE UPDATE
Care Update:    S/P LVAD.    Endocrinology consulted for BG management.   BG goal 140-180    - IIP  - Requires intensive BG monitoring.   - BG checks q1hr  - Hypoglycemia protocol in place    - Notify endocrine if patient becomes hypokalemic (K < 3.3) and/or is not responding to replacement.   - Notify endocrine if patient requiring > 20 units/hr.      ** Please notify Endocrine for any change and/or advance in diet**  ** Please call Endocrine for any BG related issues **    Discharge Planning:   TBD. Please notify endocrinology prior to discharge.      Erasmo Parrish DNP, FNP-C  Department of Endocrinology  Inpatient Glycemic Management

## 2023-12-01 NOTE — RESPIRATORY THERAPY
Received patient from the OR intubated with 7.5 ETT secured at 22cm at the lips. Placed on ventilator with documented settings and 20ppm Ketty. ABG done. Ambu and mask at bedside. Will continue to monitor.

## 2023-12-01 NOTE — ANESTHESIA POSTPROCEDURE EVALUATION
Anesthesia Post Evaluation    Patient: Radha Abbott    Procedure(s) Performed: Procedure(s) (LRB):  INSERTION-LEFT VENTRICULAR ASSIST DEVICE (Left)  STERNOTOMY, REDO (N/A)  VALVULOPLASTY, MITRAL VALVE (N/A)  LYSIS, ADHESIONS  REPAIR, AORTIC VALVE (N/A)  CLOSURE, TEMPORARY (N/A)  ANGIOPLASTY-VENOUS ARTERY, RIGHT FEMORAL (Right)  REPAIR, ANEURYSM, ARTERY, FEMORAL (Right)    Final Anesthesia Type: general      Patient location during evaluation: ICU  Patient participation: No - Unable to Participate, Sedation  Level of consciousness: sedated  Post-procedure vital signs: reviewed and stable  Pain management: adequate  Airway patency: patent    PONV status at discharge: No PONV  Anesthetic complications: no      Cardiovascular status: hemodynamically stable  Respiratory status: intubated                Vitals Value Taken Time   BP 84 mmHg MAP 12/01/23 1545   Temp 98.0 12/01/23 1545   Pulse 86 12/01/23 1545   Resp 20 12/01/23 1545   SpO2 94 12/01/23 1545   Vitals shown include unvalidated device data.      No case tracking events are documented in the log.      Pain/Blayne Score: Pain Rating Prior to Med Admin: 7 (11/30/2023  8:03 PM)  Pain Rating Post Med Admin: 2 (11/30/2023  9:03 PM)

## 2023-12-01 NOTE — PROGRESS NOTES
Orders received to disable tachy therapy for surgery. Pt has a [employer] ICD. Tachy therapies disabled at bedside. Please call device clinic @ [phone] after surgery to enable tachy therapy.

## 2023-12-01 NOTE — PLAN OF CARE
Cardiac ICU Care Plan    POC reviewed with Radha Abbott and family. Questions and concerns addressed. No acute events today. Pt progressing toward goals. Will continue to monitor. NS bolus. See below and flowsheets for full assessment and VS info.       Neuro:  Michelle Coma Scale  Best Eye Response: 4-->(E4) spontaneous  Best Motor Response: 6-->(M6) obeys commands  Best Verbal Response: 5-->(V5) oriented  Michelle Coma Scale Score: 15  Assessment Qualifiers: patient not sedated/intubated  Pupil PERRLA: no    24 hr Temp:  [98 °F (36.7 °C)-98.5 °F (36.9 °C)]      CV:  Rhythm: normal sinus rhythm   DVT prophylaxis: VTE Required Core Measure: Pharmacological prophylaxis initiated/maintained    CVP (mean): 6 mmHg (11/30/23 1500)    [REMOVED] Pulmonary Artery Catheter Assessment  11/20/23 1330 Internal Jugular Left-Current Insertion Depth (cm): 63.5 cm (11/29/23 0700)  PAP: 62/28 (11/29/23 1100)  SVO2 (%): 54 % (11/28/23 1901)    IABP Mode: Semi-auto  IABP Rate: 1:1  IABP Trigger: ECG  IABP Augmented Diastolic Pressure: 97          Pulses  Right Radial Pulse: 2+ (normal)  Left Radial Pulse: 2+ (normal)  Right Dorsalis Pedis Pulse: 1+ (weak)  Left Dorsalis Pedis Pulse: 1+ (weak)  Right Posterior Tibial Pulse: Doppler  Left Posterior Tibial Pulse: Doppler    Resp:  Flow (L/min): 4  Oxygen Concentration (%): 36    GI/:  GI prophylaxis: yes  Diet/Nutrition Received: consistent carb/diabetic diet  Last Bowel Movement: 11/27/23  Voiding Characteristics: external catheter   Intake/Output Summary (Last 24 hours) at 11/30/2023 1914  Last data filed at 11/30/2023 1800  Gross per 24 hour   Intake 1487.64 ml   Output 1075 ml   Net 412.64 ml        Nutritional Supplement Intake: Quantity 239 ml, Type: Boost    Labs/Accuchecks:  Recent Labs   Lab 11/28/23  0201 11/29/23  0400 11/30/23  0413   WBC 6.78 6.82 6.26   RBC 3.08* 3.12* 2.98*   HGB 8.6* 8.7* 8.4*   HCT 26.7* 27.4* 26.2*    297 281      Recent Labs   Lab 11/29/23  8202  "11/29/23 2242 11/30/23 0413   INR  --   --  1.1   APTT 32.3* 54.3* 48.8*  48.8*      Recent Labs     11/30/23 0413 11/30/23  1530   * 134*   K 4.4 4.8   CO2 24 21*    103   BUN 60* 58*   CREATININE 2.1* 2.3*   ALKPHOS 78  --    ALT 23  --    AST 30  --    BILITOT 0.5  --      No results for input(s): "CPK", "CPKMB", "MB", "TROPONINI" in the last 168 hours.   Recent Labs     11/29/23  1958 11/30/23  0750 11/30/23  1520   PH 7.437 7.434 7.439   PCO2 40.2 39.2 38.6   PO2 30* 26* 26*   HCO3 27.1 26.2 26.2   POCSATURATED 61 49 50   BE 3* 2 2       Electrolytes: N/A - electrolytes WDL  Accuchecks: ACHS    Gtts/LDAs:   sodium chloride 0.9%      sodium chloride 0.9% Stopped (11/30/23 1755)    DOBUTamine 5 mcg/kg/min (11/30/23 1800)    heparin (porcine) in D5W 16 Units/kg/hr (11/30/23 1800)    sodium chloride 0.9% 250 mL/hr at 11/20/23 2002       Lines/Drains/Airways       Central Venous Catheter Line  Duration             Percutaneous Central Line Insertion/Assessment - Triple Lumen  11/29/23 1543 Internal Jugular Right 1 day              Drain  Duration             Male External Urinary Catheter 11/26/23 0628 4 days              Line  Duration                  IABP 11/21/23 1048 7.5 Fr. 40 mL 9 days              Peripheral Intravenous Line  Duration                  Peripheral IV - Single Lumen 11/29/23 1100 20 G Anterior;Left Forearm 1 day         Peripheral IV - Single Lumen 11/29/23 1100 20 G Anterior;Left Forearm 1 day                    Skin/Wounds  Bathing/Skin Care: foot care (11/30/23 1700)  Wounds: No  Wound care consulted: No   "

## 2023-12-01 NOTE — ANESTHESIA PREPROCEDURE EVALUATION
[facility]  Anesthesia Pre-Operative Evaluation        Patient Name: Radha Abbott  YOB: 1960  MRN: [id number]    SUBJECTIVE:     Pre-operative Evaluation for Procedure(s) (LRB):  INSERTION-LEFT VENTRICULAR ASSIST DEVICE (Left)     12/01/2023    Radha Abbott is a 63 y.o. male with a PMHx significant for CAD s/p 3V CABG in 2009 now with EF 10-15% with ICD in place, normal RV function, moderate MR, PASP 41 on home , admitted for exacerbation of heart failure 11.10. Other co morbidities include uncontrolled DM2, HTN, HLD, hx of vfib taking home amio, CKD (baseline Cr ~2.5).    Now s/p LVAD 12/1 presenting for chest closure.        Cardiac Studies  Interpretation Summary    Left Ventricle: The left ventricle is moderately dilated. Mildly increased ventricular mass. Normal wall thickness. There is eccentric hypertrophy. Severe global hyperkinesis present. There is severely reduced systolic function with a visually estimated ejection fraction of 10 -15%. Grade III diastolic dysfunction.    Right Ventricle: Normal right ventricular cavity size. Systolic function is normal. Pacemaker lead present in the ventricle.    Left Atrium: Left atrium is severely dilated.    Mitral Valve: There is annular and bileaflet tethering. There is moderate regurgitation with a centrally directed jet.    Tricuspid Valve: There is mild regurgitation with a centrally directed jet.    Pulmonary Artery: The estimated pulmonary artery systolic pressure is 41 mmHg.    IVC/SVC: Normal venous pressure at 3 mmHg.      Review of patient's allergies indicates:  No Known Allergies    Current Outpatient Medications   Medication Instructions    amiodarone (PACERONE) 400 mg, Oral, Daily    aspirin (ECOTRIN) 81 mg, Oral, Daily    atorvastatin (LIPITOR) 40 mg, Oral    DOBUTamine (DOBUTREX) 1,000 mg/250 mL (4,000 mcg/mL) infusion 5 mcg/kg/min, Intravenous, Continuous    ELIQUIS 5 mg, Oral, 2 times daily    fish oil-omega-3 fatty  acids 300-1,000 mg capsule 2 g, Oral, Daily    furosemide (LASIX) 40 mg, Oral, 2 times daily    gabapentin (NEURONTIN) 300 mg, Oral, Nightly PRN    metOLazone (ZAROXOLYN) 2.5 mg, Oral, Daily, Thursday 11/9 and Saturday 11/10    multivitamin capsule 1 capsule, Oral, Daily    pantoprazole (PROTONIX) 40 mg, Oral, Before breakfast    potassium chloride SA (K-DUR,KLOR-CON) 20 MEQ tablet 20 mEq, Oral, Daily    sacubitriL-valsartan (ENTRESTO) 24-26 mg per tablet 1 tablet, Oral, 2 times daily    torsemide (DEMADEX) 40 mg, Oral, 2 times daily         Social History     Substance and Sexual Activity   Drug Use No     Alcohol Use: Not At Risk (10/27/2023)    AUDIT-C     Frequency of Alcohol Consumption: Never     Average Number of Drinks: Patient does not drink     Frequency of Binge Drinking: Never     Tobacco Use: High Risk (11/22/2023)    Patient History     Smoking Tobacco Use: Every Day     Smokeless Tobacco Use: Unknown     Passive Exposure: Not on file       OBJECTIVE:     Vital Signs Range (Last 24H):  Temp:  [36.7 °C (98.1 °F)-37.4 °C (99.4 °F)]   Pulse:  [86-98]   Resp:  [13-24]   BP: (105-146)/(55-87)   SpO2:  [96 %-100 %]       Significant Labs    Heme Profile  Lab Results   Component Value Date    WBC 6.84 12/01/2023    HGB 7.9 (L) 12/01/2023    HCT 22 (L) 12/01/2023     12/01/2023       Coagulation Studies  Lab Results   Component Value Date    LABPROT 11.4 11/30/2023    INR 1.1 11/30/2023    APTT 50.3 (H) 12/01/2023       BMP  Lab Results   Component Value Date     (L) 12/01/2023    K 4.5 12/01/2023    CO2 25 12/01/2023    BUN 52 (H) 12/01/2023    CREATININE 2.1 (H) 12/01/2023    MG 2.8 (H) 12/01/2023    PHOS 4.1 11/28/2023       Endocrine Profile  Lab Results   Component Value Date    HGBA1C 7.6 (H) 10/12/2023    TSH 1.381 11/15/2023       ASSESSMENT/PLAN:       Pre-op Assessment    I have reviewed the Patient Summary Reports.     I have reviewed the Nursing Notes.    I have reviewed the  Medications.     Review of Systems  Anesthesia Hx:  No problems with previous Anesthesia   History of prior surgery of interest to airway management or planning:          Denies Family Hx of Anesthesia complications.    Denies Personal Hx of Anesthesia complications.                    Social:  Smoker       Hematology/Oncology:  Hematology Normal   Oncology Normal                                   EENT/Dental:  EENT/Dental Normal           Cardiovascular:    Pacemaker   Past MI CAD   CABG/stent    CHF                                 Pulmonary:  Pulmonary Normal   Denies COPD.      Denies Sleep Apnea.                Renal/:  Chronic Renal Disease, ARF                Hepatic/GI:  Hepatic/GI Normal     Denies GERD.             Musculoskeletal:  Musculoskeletal Normal                Neurological:  Neurology Normal   Denies CVA.                                    Endocrine:  Diabetes           Dermatological:  Skin Normal    Psych:  Psychiatric Normal                    Physical Exam  General: Well nourished, Cooperative, Alert and Oriented    Airway:  Mallampati: IV / III  Mouth Opening: Normal  TM Distance: Normal  Tongue: Normal  Neck ROM: Normal ROM    Dental:  Dentures  Top dentures. Bottom teeth intact.      Anesthesia Plan  Type of Anesthesia, risks & benefits discussed:    Anesthesia Type: Gen ETT  Intra-op Monitoring Plan: Standard ASA Monitors, Art Line, Central Line, MOY, PA and CO  Post Op Pain Control Plan: multimodal analgesia and IV/PO Opioids PRN  Induction:  IV  Airway Plan: Direct, Post-Induction  Informed Consent: Informed consent signed with the Patient representative and all parties understand the risks and agree with anesthesia plan.  All questions answered.   ASA Score: 4  Day of Surgery Review of History & Physical: H&P Update referred to the surgeon/provider.    Ready For Surgery From Anesthesia Perspective.     .

## 2023-12-01 NOTE — CARE UPDATE
-Glucose Goal 140-180    -A1C:   Hemoglobin A1C   Date Value Ref Range Status   10/12/2023 7.6 (H) 4.0 - 5.6 % Final     Comment:     ADA Screening Guidelines:  5.7-6.4%  Consistent with prediabetes  >or=6.5%  Consistent with diabetes    High levels of fetal hemoglobin interfere with the HbA1C  assay. Heterozygous hemoglobin variants (HbS, HgC, etc)do  not significantly interfere with this assay.   However, presence of multiple variants may affect accuracy.           -HOME REGIMEN:   Metformin (off since October)     -GLUCOSE TREND FOR THE PAST 24HRS:   Recent Labs   Lab 11/29/23 2002 11/30/23  0848 11/30/23  1154 11/30/23  1615 11/30/23  1956 12/01/23  0618   POCTGLUCOSE 83 266* 75 207* 246* 188*           -NO HYPOGYCEMIAS NOTED     - Diet  Diet NPO Except for: Medication    -TOLERATING 75 % OF PO DIET     -NPO? No    -Steroids - None      -Tube Feeds - None         Plan:   - Levemir (Insulin Detemir) to 16 units nightly    - Continue Novolog (Insulin Aspart) 10 units TIDWM and prn for BG excursions [facility] SSI (150/25)   - BG checks AC/HS  - Hypoglycemia protocol in place  ** Please notify Endocrine for any change and/or advance in diet**  ** Please call Endocrine for any BG related issues **     Discharge Planning:   TBD. Please notify endocrinology prior to discharge.

## 2023-12-01 NOTE — ANESTHESIA PROCEDURE NOTES
Central Line    Diagnosis: Aortic Stenosis  Patient location during procedure: done in OR    Staffing  Authorizing Provider: Ebony Oliveira MD  Performing Provider: Doc Mosquera DO    Staffing  Performed by: Doc Mosquera DO  Authorized by: Ebony Oliveira MD    Anesthesiologist was present at the time of the procedure.  Preanesthetic Checklist  Completed: patient identified, IV checked, site marked, risks and benefits discussed, surgical consent, monitors and equipment checked, pre-op evaluation, timeout performed and anesthesia consent given  Indication   Indication: hemodynamic monitoring, vascular access     Anesthesia   local infiltration    Central Line   Skin Prep: skin prepped with ChloraPrep, skin prep agent completely dried prior to procedure  Sterile Barriers Followed: Yes    All five maximal barriers used- gloves, gown, cap, mask, and large sterile sheet    hand hygiene performed prior to central venous catheter insertion  Location: left internal jugular.   Catheter type: dialysis  Catheter Size: 12 Fr  Inserted Catheter Length: 20 cm  Ultrasound: vascular probe with ultrasound   Vessel Caliber: patent  Needle advanced into vessel with real time Ultrasound guidance.  sterile gel and probe cover used in ultrasound-guided central venous catheter insertion   Manometry: Venous cannualation confirmed by visual estimation of blood vessel pressure using manometry.  Insertion Attempts: 1   Securement:line sutured, chlorhexidine patch, sterile dressing applied and blood return through all ports    Post-Procedure    Adverse Events:none      Guidewire Guidewire removed intact. Guidewire removed intact, verified with nurse.

## 2023-12-01 NOTE — ANESTHESIA PROCEDURE NOTES
MOY    Diagnosis: ischemic cardiomyopathy  Patient location during procedure: OR  Exam type: Baseline    Staffing  Performed: anesthesiologist and fellow     Anesthesiologist: Ebony Oliveira MD        Anesthesiologist Present  Yes      Setup & InductionDoppler Echo: 2D, color flow mapping, pulse wave Doppler and continuous wave Doppler.  Exam     Left Heart  Left Atruim: dilated and severly enlarged (men >5.2; women >4.7) and 6.4    Left Ventricle: 7.8 cm, severely abnormal (men >/= 6.8; women>/= 6.1)    LVAD:no  Estimated Ejection Fraction: < 25% severe            Right Heart  Right Ventricle: dilated  Right Ventricle Function: moderately decreased  Right Atria:  normal    Intra Atrial Septum  PFO: yes by color flow doppler      no Atrial Septal Defect (Asd)    Right Ventricle  Size: dilated, Free Wall Thickness: normal  Tricuspid Annular Plane: 12.3 mm  Fractional Area of Change:29    Aortic Valve:  Stenosis: none.  Morphology: trileaflet    LVOT: 2.2 cm  Regurgitation: mild to moderate aortic regurgitation      Mitral Valve:   Morphology:calcified  Jet Description: moderate-to-severe and centrally-directed    Tricuspid Valve:  Morphology: normal  Regurgitation: mild    Pulmonic Valve:  Morphology:normal  Regurgitation(color flow): mild    Great Vessels  Ascending Aorta Atherosclerosis: 2=mild dz (<3mm)  Aortic Arch Atherosclerosis: 1=nl-min dz  IABP: yes  Descending Aorta Atherosclerosis: 1=nl-min dz  Aorta    Descending aorta IABP: yes    Effusions none    SummaryFindings discussed with surgeon.    Other Findings   Pre-LVAD:  Patient with severely dilated LV with severely reduced LV function. EF <15%.    RV mildly dilated with moderately reduced RV function by TAPSE and FAC.   Moderate to severe central MR  Mild AI  Mild to moderate TR, annulus 4.55 cm  Small PFO    Post:  LV systolic function remains reduced. LVEF <15%  RV function slightly improved on significantly more inotropic support   MV s/p repair now  with mild to moderate MR  TR unchanged  AV s/p repair now with trace AI  Small PFO still present now with R to L flow  No dissection   No effusions    Probe removed atraumatically

## 2023-12-01 NOTE — INTERVAL H&P NOTE
The patient has been examined and the H&P has been reviewed:    I concur with the findings and no changes have occurred since H&P was written.    Surgery risks, benefits and alternative options discussed and understood by patient/family.          Active Hospital Problems    Diagnosis  POA    *Acute on chronic combined systolic and diastolic CHF, NYHA class 4 [I50.43]  Yes    Pre-transplant evaluation for heart transplant [Z01.818]  Not Applicable    Palliative care encounter [Z51.5]  Not Applicable    Advance care planning [Z71.89]  Not Applicable    PAF (paroxysmal atrial fibrillation) [I48.0]  Yes    IVÁN (acute kidney injury) [N17.9]  Yes    Ventricular tachycardia [I47.20]  Yes     Admit to ICU  Amiodarone infusion  Cardiology consult  Investigate defibrillator - EP study ?      Type 2 diabetes mellitus without complication, without long-term current use of insulin [E11.9]  Yes     Resume home meds  SSI      CAD (coronary artery disease) [I25.10]  Yes     Continue home medications          Resolved Hospital Problems    Diagnosis Date Resolved POA    Atrial fibrillation with tachycardic ventricular rate [I48.91] 11/12/2023 Yes

## 2023-12-01 NOTE — ANESTHESIA PROCEDURE NOTES
Riga Siva Line    Diagnosis: CHF  Patient location during procedure: done in OR    Staffing  Authorizing Provider: Ebony Oliveira MD  Performing Provider: Doc Mosquera DO    Staffing  Performed by: Doc Mosquera DO  Authorized by: Ebony Oliveira MD    Anesthesiologist was present at the time of the procedure.  Riga Siva Line  Skin Prep: chlorhexidine gluconate  Local Infiltration: lidocaine  Location: right,  internal jugular vein   Catheter Size: 9 Fr  Catheter placement by yes. Heme positive aspiration all ports.Insertion Attempts: 1  Indication: hemodynamic monitoring, transvenous pacing  Ultrasound Guidance  Needle advanced into vessel with real time Ultrasound guidance.  Image recorded and saved.  Assessment  Central Line Bundle Protocol followed. Hand hygiene before procedure, surgical cap worn, surgical mask worn, sterile surgical gloves worn, large sterile drape used.  Dressing: secured with tape and tegaderm, secured with tape, sutured in place and taped, tegaderm, steri-strips and dry sterile gauze  Patient: Tolerated Well

## 2023-12-02 LAB
ALBUMIN SERPL BCP-MCNC: 2.6 G/DL (ref 3.5–5.2)
ALBUMIN SERPL BCP-MCNC: 2.7 G/DL (ref 3.5–5.2)
ALLENS TEST: ABNORMAL
ALLENS TEST: NORMAL
ALP SERPL-CCNC: 48 U/L (ref 55–135)
ALP SERPL-CCNC: 50 U/L (ref 55–135)
ALP SERPL-CCNC: 52 U/L (ref 55–135)
ALP SERPL-CCNC: 54 U/L (ref 55–135)
ALP SERPL-CCNC: 62 U/L (ref 55–135)
ALT SERPL W/O P-5'-P-CCNC: 28 U/L (ref 10–44)
ALT SERPL W/O P-5'-P-CCNC: 30 U/L (ref 10–44)
ALT SERPL W/O P-5'-P-CCNC: 32 U/L (ref 10–44)
ALT SERPL W/O P-5'-P-CCNC: 34 U/L (ref 10–44)
ALT SERPL W/O P-5'-P-CCNC: 35 U/L (ref 10–44)
ANION GAP SERPL CALC-SCNC: 10 MMOL/L (ref 8–16)
ANION GAP SERPL CALC-SCNC: 10 MMOL/L (ref 8–16)
ANION GAP SERPL CALC-SCNC: 11 MMOL/L (ref 8–16)
ANION GAP SERPL CALC-SCNC: 12 MMOL/L (ref 8–16)
ANION GAP SERPL CALC-SCNC: 13 MMOL/L (ref 8–16)
ANISOCYTOSIS BLD QL SMEAR: SLIGHT
APTT PPP: 24.3 SEC (ref 21–32)
APTT PPP: 25.8 SEC (ref 21–32)
APTT PPP: 25.9 SEC (ref 21–32)
APTT PPP: 26.2 SEC (ref 21–32)
APTT PPP: 28.5 SEC (ref 21–32)
AST SERPL-CCNC: 81 U/L (ref 10–40)
AST SERPL-CCNC: 85 U/L (ref 10–40)
AST SERPL-CCNC: 88 U/L (ref 10–40)
AST SERPL-CCNC: 89 U/L (ref 10–40)
AST SERPL-CCNC: 89 U/L (ref 10–40)
BASO STIPL BLD QL SMEAR: ABNORMAL
BASOPHILS # BLD AUTO: 0.03 K/UL (ref 0–0.2)
BASOPHILS # BLD AUTO: 0.04 K/UL (ref 0–0.2)
BASOPHILS # BLD AUTO: 0.04 K/UL (ref 0–0.2)
BASOPHILS # BLD AUTO: ABNORMAL K/UL (ref 0–0.2)
BASOPHILS # BLD AUTO: ABNORMAL K/UL (ref 0–0.2)
BASOPHILS NFR BLD: 0 % (ref 0–1.9)
BASOPHILS NFR BLD: 0 % (ref 0–1.9)
BASOPHILS NFR BLD: 0.3 % (ref 0–1.9)
BASOPHILS NFR BLD: 0.3 % (ref 0–1.9)
BASOPHILS NFR BLD: 0.4 % (ref 0–1.9)
BILIRUB SERPL-MCNC: 0.8 MG/DL (ref 0.1–1)
BILIRUB SERPL-MCNC: 1 MG/DL (ref 0.1–1)
BILIRUB SERPL-MCNC: 1.3 MG/DL (ref 0.1–1)
BILIRUB SERPL-MCNC: 1.4 MG/DL (ref 0.1–1)
BILIRUB SERPL-MCNC: 1.8 MG/DL (ref 0.1–1)
BIPAP: 0
BLD PROD TYP BPU: NORMAL
BLD PROD TYP BPU: NORMAL
BLOOD UNIT EXPIRATION DATE: NORMAL
BLOOD UNIT EXPIRATION DATE: NORMAL
BLOOD UNIT TYPE CODE: 6200
BLOOD UNIT TYPE CODE: 6200
BLOOD UNIT TYPE: NORMAL
BLOOD UNIT TYPE: NORMAL
BUN SERPL-MCNC: 46 MG/DL (ref 8–23)
BUN SERPL-MCNC: 47 MG/DL (ref 8–23)
BURR CELLS BLD QL SMEAR: ABNORMAL
CALCIUM SERPL-MCNC: 8 MG/DL (ref 8.7–10.5)
CALCIUM SERPL-MCNC: 8.2 MG/DL (ref 8.7–10.5)
CALCIUM SERPL-MCNC: 8.3 MG/DL (ref 8.7–10.5)
CHLORIDE SERPL-SCNC: 107 MMOL/L (ref 95–110)
CHLORIDE SERPL-SCNC: 108 MMOL/L (ref 95–110)
CHLORIDE SERPL-SCNC: 108 MMOL/L (ref 95–110)
CHLORIDE SERPL-SCNC: 109 MMOL/L (ref 95–110)
CHLORIDE SERPL-SCNC: 110 MMOL/L (ref 95–110)
CO2 SERPL-SCNC: 20 MMOL/L (ref 23–29)
CO2 SERPL-SCNC: 21 MMOL/L (ref 23–29)
CODING SYSTEM: NORMAL
CODING SYSTEM: NORMAL
CREAT SERPL-MCNC: 2.2 MG/DL (ref 0.5–1.4)
CREAT SERPL-MCNC: 2.2 MG/DL (ref 0.5–1.4)
CREAT SERPL-MCNC: 2.3 MG/DL (ref 0.5–1.4)
CREAT SERPL-MCNC: 2.3 MG/DL (ref 0.5–1.4)
CREAT SERPL-MCNC: 2.4 MG/DL (ref 0.5–1.4)
CROSSMATCH INTERPRETATION: NORMAL
CROSSMATCH INTERPRETATION: NORMAL
DELSYS: ABNORMAL
DELSYS: NORMAL
DIFFERENTIAL METHOD BLD: ABNORMAL
DISPENSE STATUS: NORMAL
DISPENSE STATUS: NORMAL
DOHLE BOD BLD QL SMEAR: PRESENT
EOSINOPHIL # BLD AUTO: 0 K/UL (ref 0–0.5)
EOSINOPHIL # BLD AUTO: ABNORMAL K/UL (ref 0–0.5)
EOSINOPHIL # BLD AUTO: ABNORMAL K/UL (ref 0–0.5)
EOSINOPHIL NFR BLD: 0 % (ref 0–8)
EOSINOPHIL NFR BLD: 0.1 % (ref 0–8)
ERYTHROCYTE [DISTWIDTH] IN BLOOD BY AUTOMATED COUNT: 15.6 % (ref 11.5–14.5)
ERYTHROCYTE [DISTWIDTH] IN BLOOD BY AUTOMATED COUNT: 15.8 % (ref 11.5–14.5)
ERYTHROCYTE [DISTWIDTH] IN BLOOD BY AUTOMATED COUNT: 15.9 % (ref 11.5–14.5)
ERYTHROCYTE [DISTWIDTH] IN BLOOD BY AUTOMATED COUNT: 16 % (ref 11.5–14.5)
ERYTHROCYTE [DISTWIDTH] IN BLOOD BY AUTOMATED COUNT: 16.3 % (ref 11.5–14.5)
ERYTHROCYTE [SEDIMENTATION RATE] IN BLOOD BY WESTERGREN METHOD: 18 MM/H
EST. GFR  (NO RACE VARIABLE): 29.9 ML/MIN/1.73 M^2
EST. GFR  (NO RACE VARIABLE): 31.5 ML/MIN/1.73 M^2
EST. GFR  (NO RACE VARIABLE): 31.5 ML/MIN/1.73 M^2
EST. GFR  (NO RACE VARIABLE): 33.2 ML/MIN/1.73 M^2
EST. GFR  (NO RACE VARIABLE): 33.2 ML/MIN/1.73 M^2
FIBRINOGEN PPP-MCNC: 406 MG/DL (ref 182–400)
FIBRINOGEN PPP-MCNC: 425 MG/DL (ref 182–400)
FIBRINOGEN PPP-MCNC: 435 MG/DL (ref 182–400)
FIBRINOGEN PPP-MCNC: 477 MG/DL (ref 182–400)
FIBRINOGEN PPP-MCNC: 550 MG/DL (ref 182–400)
FIO2: 40
FIO2: 40 %
GIANT PLATELETS BLD QL SMEAR: PRESENT
GIANT PLATELETS BLD QL SMEAR: PRESENT
GLUCOSE SERPL-MCNC: 114 MG/DL (ref 70–110)
GLUCOSE SERPL-MCNC: 120 MG/DL (ref 70–110)
GLUCOSE SERPL-MCNC: 146 MG/DL (ref 70–110)
GLUCOSE SERPL-MCNC: 183 MG/DL (ref 70–110)
GLUCOSE SERPL-MCNC: 184 MG/DL (ref 70–110)
HCO3 UR-SCNC: 20.8 MMOL/L (ref 24–28)
HCO3 UR-SCNC: 21.6 MMOL/L (ref 24–28)
HCO3 UR-SCNC: 21.6 MMOL/L (ref 24–28)
HCO3 UR-SCNC: 21.9 MMOL/L (ref 24–28)
HCO3 UR-SCNC: 22 MMOL/L (ref 24–28)
HCO3 UR-SCNC: 22.1 MMOL/L (ref 24–28)
HCO3 UR-SCNC: 22.3 MMOL/L (ref 24–28)
HCO3 UR-SCNC: 22.4 MMOL/L (ref 24–28)
HCO3 UR-SCNC: 22.4 MMOL/L (ref 24–28)
HCO3 UR-SCNC: 22.5 MMOL/L (ref 24–28)
HCO3 UR-SCNC: 22.6 MMOL/L (ref 24–28)
HCO3 UR-SCNC: 23.7 MMOL/L (ref 24–28)
HCT VFR BLD AUTO: 20.2 % (ref 40–54)
HCT VFR BLD AUTO: 21.9 % (ref 40–54)
HCT VFR BLD AUTO: 22.2 % (ref 40–54)
HCT VFR BLD AUTO: 22.7 % (ref 40–54)
HCT VFR BLD AUTO: 24.9 % (ref 40–54)
HCT VFR BLD CALC: 22 %PCV (ref 36–54)
HCT VFR BLD CALC: 23 %PCV (ref 36–54)
HCT VFR BLD CALC: 24 %PCV (ref 36–54)
HCT VFR BLD CALC: 29 %PCV (ref 36–54)
HGB BLD-MCNC: 7.2 G/DL (ref 14–18)
HGB BLD-MCNC: 7.8 G/DL (ref 14–18)
HGB BLD-MCNC: 7.8 G/DL (ref 14–18)
HGB BLD-MCNC: 8.1 G/DL (ref 14–18)
HGB BLD-MCNC: 8.9 G/DL (ref 14–18)
HYPOCHROMIA BLD QL SMEAR: ABNORMAL
HYPOCHROMIA BLD QL SMEAR: ABNORMAL
IMM GRANULOCYTES # BLD AUTO: 0.07 K/UL (ref 0–0.04)
IMM GRANULOCYTES # BLD AUTO: 0.07 K/UL (ref 0–0.04)
IMM GRANULOCYTES # BLD AUTO: 0.1 K/UL (ref 0–0.04)
IMM GRANULOCYTES # BLD AUTO: ABNORMAL K/UL (ref 0–0.04)
IMM GRANULOCYTES # BLD AUTO: ABNORMAL K/UL (ref 0–0.04)
IMM GRANULOCYTES NFR BLD AUTO: 0.5 % (ref 0–0.5)
IMM GRANULOCYTES NFR BLD AUTO: 0.6 % (ref 0–0.5)
IMM GRANULOCYTES NFR BLD AUTO: 0.8 % (ref 0–0.5)
IMM GRANULOCYTES NFR BLD AUTO: ABNORMAL % (ref 0–0.5)
IMM GRANULOCYTES NFR BLD AUTO: ABNORMAL % (ref 0–0.5)
INR PPP: 1 (ref 0.8–1.2)
INR PPP: 1.1 (ref 0.8–1.2)
INR PPP: 1.1 (ref 0.8–1.2)
LDH SERPL L TO P-CCNC: 0.67 MMOL/L (ref 0.36–1.25)
LDH SERPL L TO P-CCNC: 0.72 MMOL/L (ref 0.36–1.25)
LDH SERPL L TO P-CCNC: 0.74 MMOL/L (ref 0.36–1.25)
LDH SERPL L TO P-CCNC: 0.75 MMOL/L (ref 0.36–1.25)
LDH SERPL L TO P-CCNC: 0.78 MMOL/L (ref 0.36–1.25)
LDH SERPL L TO P-CCNC: 0.84 MMOL/L (ref 0.36–1.25)
LDH SERPL L TO P-CCNC: 0.87 MMOL/L (ref 0.36–1.25)
LDH SERPL L TO P-CCNC: 1.11 MMOL/L (ref 0.36–1.25)
LDH SERPL L TO P-CCNC: 1.17 MMOL/L (ref 0.36–1.25)
LDH SERPL L TO P-CCNC: 1.32 MMOL/L (ref 0.36–1.25)
LYMPHOCYTES # BLD AUTO: 0.5 K/UL (ref 1–4.8)
LYMPHOCYTES # BLD AUTO: 0.5 K/UL (ref 1–4.8)
LYMPHOCYTES # BLD AUTO: 0.6 K/UL (ref 1–4.8)
LYMPHOCYTES # BLD AUTO: ABNORMAL K/UL (ref 1–4.8)
LYMPHOCYTES # BLD AUTO: ABNORMAL K/UL (ref 1–4.8)
LYMPHOCYTES NFR BLD: 3.9 % (ref 18–48)
LYMPHOCYTES NFR BLD: 4.6 % (ref 18–48)
LYMPHOCYTES NFR BLD: 5 % (ref 18–48)
LYMPHOCYTES NFR BLD: 5.8 % (ref 18–48)
LYMPHOCYTES NFR BLD: 7 % (ref 18–48)
MAGNESIUM SERPL-MCNC: 2.4 MG/DL (ref 1.6–2.6)
MAGNESIUM SERPL-MCNC: 2.5 MG/DL (ref 1.6–2.6)
MAGNESIUM SERPL-MCNC: 2.5 MG/DL (ref 1.6–2.6)
MAGNESIUM SERPL-MCNC: 2.6 MG/DL (ref 1.6–2.6)
MAGNESIUM SERPL-MCNC: 2.7 MG/DL (ref 1.6–2.6)
MCH RBC QN AUTO: 28.6 PG (ref 27–31)
MCH RBC QN AUTO: 29 PG (ref 27–31)
MCH RBC QN AUTO: 29.1 PG (ref 27–31)
MCH RBC QN AUTO: 29.4 PG (ref 27–31)
MCH RBC QN AUTO: 29.6 PG (ref 27–31)
MCHC RBC AUTO-ENTMCNC: 35.1 G/DL (ref 32–36)
MCHC RBC AUTO-ENTMCNC: 35.6 G/DL (ref 32–36)
MCHC RBC AUTO-ENTMCNC: 35.6 G/DL (ref 32–36)
MCHC RBC AUTO-ENTMCNC: 35.7 G/DL (ref 32–36)
MCHC RBC AUTO-ENTMCNC: 35.7 G/DL (ref 32–36)
MCV RBC AUTO: 80 FL (ref 82–98)
MCV RBC AUTO: 82 FL (ref 82–98)
MCV RBC AUTO: 82 FL (ref 82–98)
MCV RBC AUTO: 83 FL (ref 82–98)
MCV RBC AUTO: 83 FL (ref 82–98)
METAMYELOCYTES NFR BLD MANUAL: 1 %
METAMYELOCYTES NFR BLD MANUAL: 4 %
METHEMOGLOBIN: 1.7 % (ref 0–3)
MIN VOL: 8.5
MIN VOL: 9
MODE: ABNORMAL
MODE: NORMAL
MONOCYTES # BLD AUTO: 0.9 K/UL (ref 0.3–1)
MONOCYTES # BLD AUTO: 0.9 K/UL (ref 0.3–1)
MONOCYTES # BLD AUTO: 1 K/UL (ref 0.3–1)
MONOCYTES # BLD AUTO: ABNORMAL K/UL (ref 0.3–1)
MONOCYTES # BLD AUTO: ABNORMAL K/UL (ref 0.3–1)
MONOCYTES NFR BLD: 6 % (ref 4–15)
MONOCYTES NFR BLD: 6.9 % (ref 4–15)
MONOCYTES NFR BLD: 7.6 % (ref 4–15)
MONOCYTES NFR BLD: 8 % (ref 4–15)
MONOCYTES NFR BLD: 8.4 % (ref 4–15)
MYELOCYTES NFR BLD MANUAL: 1 %
MYELOCYTES NFR BLD MANUAL: 3 %
NEUTROPHILS # BLD AUTO: 10.3 K/UL (ref 1.8–7.7)
NEUTROPHILS # BLD AUTO: 12.2 K/UL (ref 1.8–7.7)
NEUTROPHILS # BLD AUTO: 9.4 K/UL (ref 1.8–7.7)
NEUTROPHILS NFR BLD: 71 % (ref 38–73)
NEUTROPHILS NFR BLD: 79 % (ref 38–73)
NEUTROPHILS NFR BLD: 84.8 % (ref 38–73)
NEUTROPHILS NFR BLD: 86.7 % (ref 38–73)
NEUTROPHILS NFR BLD: 88.3 % (ref 38–73)
NEUTS BAND NFR BLD MANUAL: 11 %
NEUTS BAND NFR BLD MANUAL: 4 %
NRBC BLD-RTO: 0 /100 WBC
NUM UNITS TRANS FFP: NORMAL
NUM UNITS TRANS FFP: NORMAL
OVALOCYTES BLD QL SMEAR: ABNORMAL
PCO2 BLDA: 28.7 MMHG (ref 35–45)
PCO2 BLDA: 28.9 MMHG (ref 35–45)
PCO2 BLDA: 29.2 MMHG (ref 35–45)
PCO2 BLDA: 29.2 MMHG (ref 35–45)
PCO2 BLDA: 29.5 MMHG (ref 35–45)
PCO2 BLDA: 29.9 MMHG (ref 35–45)
PCO2 BLDA: 30 MMHG (ref 35–45)
PCO2 BLDA: 30.2 MMHG (ref 35–45)
PCO2 BLDA: 30.4 MMHG (ref 35–45)
PCO2 BLDA: 30.5 MMHG (ref 35–45)
PCO2 BLDA: 31.4 MMHG (ref 35–45)
PCO2 BLDA: 34.4 MMHG (ref 35–45)
PEEP: 5
PH SMN: 7.45 [PH] (ref 7.35–7.45)
PH SMN: 7.46 [PH] (ref 7.35–7.45)
PH SMN: 7.46 [PH] (ref 7.35–7.45)
PH SMN: 7.47 [PH] (ref 7.35–7.45)
PH SMN: 7.48 [PH] (ref 7.35–7.45)
PH SMN: 7.48 [PH] (ref 7.35–7.45)
PH SMN: 7.49 [PH] (ref 7.35–7.45)
PHOSPHATE SERPL-MCNC: 3.4 MG/DL (ref 2.7–4.5)
PHOSPHATE SERPL-MCNC: 4.2 MG/DL (ref 2.7–4.5)
PHOSPHATE SERPL-MCNC: 4.7 MG/DL (ref 2.7–4.5)
PHOSPHATE SERPL-MCNC: 5 MG/DL (ref 2.7–4.5)
PHOSPHATE SERPL-MCNC: 5.2 MG/DL (ref 2.7–4.5)
PIP: 22
PIP: 24
PIP: 25
PIP: 27
PIP: 27
PIP: 28
PLATELET # BLD AUTO: 142 K/UL (ref 150–450)
PLATELET # BLD AUTO: 145 K/UL (ref 150–450)
PLATELET # BLD AUTO: 148 K/UL (ref 150–450)
PLATELET # BLD AUTO: 152 K/UL (ref 150–450)
PLATELET # BLD AUTO: 159 K/UL (ref 150–450)
PLATELET BLD QL SMEAR: ABNORMAL
PMV BLD AUTO: 10.5 FL (ref 9.2–12.9)
PMV BLD AUTO: 10.8 FL (ref 9.2–12.9)
PMV BLD AUTO: 10.9 FL (ref 9.2–12.9)
PMV BLD AUTO: 10.9 FL (ref 9.2–12.9)
PMV BLD AUTO: 11.5 FL (ref 9.2–12.9)
PO2 BLDA: 158 MMHG (ref 80–100)
PO2 BLDA: 160 MMHG (ref 80–100)
PO2 BLDA: 166 MMHG (ref 80–100)
PO2 BLDA: 167 MMHG (ref 80–100)
PO2 BLDA: 169 MMHG (ref 80–100)
PO2 BLDA: 172 MMHG (ref 80–100)
PO2 BLDA: 173 MMHG (ref 80–100)
PO2 BLDA: 175 MMHG (ref 80–100)
PO2 BLDA: 175 MMHG (ref 80–100)
PO2 BLDA: 177 MMHG (ref 80–100)
PO2 BLDA: 179 MMHG (ref 80–100)
PO2 BLDA: 29 MMHG (ref 40–60)
POC BE: -1 MMOL/L
POC BE: -2 MMOL/L
POC BE: -3 MMOL/L
POC BE: 0 MMOL/L
POC IONIZED CALCIUM: 1.09 MMOL/L (ref 1.06–1.42)
POC IONIZED CALCIUM: 1.11 MMOL/L (ref 1.06–1.42)
POC IONIZED CALCIUM: 1.12 MMOL/L (ref 1.06–1.42)
POC IONIZED CALCIUM: 1.14 MMOL/L (ref 1.06–1.42)
POC METHB: 1.7 % (ref 0–3)
POC PERFORMED BY: NORMAL
POC SATURATED O2: 100 % (ref 95–100)
POC SATURATED O2: 59 % (ref 95–100)
POC TCO2: 22 MMOL/L (ref 23–27)
POC TCO2: 23 MMOL/L (ref 23–27)
POC TCO2: 24 MMOL/L (ref 23–27)
POC TCO2: 25 MMOL/L (ref 24–29)
POC TEMPERATURE: 37 C
POCT GLUCOSE: 109 MG/DL (ref 70–110)
POCT GLUCOSE: 109 MG/DL (ref 70–110)
POCT GLUCOSE: 112 MG/DL (ref 70–110)
POCT GLUCOSE: 113 MG/DL (ref 70–110)
POCT GLUCOSE: 121 MG/DL (ref 70–110)
POCT GLUCOSE: 124 MG/DL (ref 70–110)
POCT GLUCOSE: 129 MG/DL (ref 70–110)
POCT GLUCOSE: 132 MG/DL (ref 70–110)
POCT GLUCOSE: 134 MG/DL (ref 70–110)
POCT GLUCOSE: 139 MG/DL (ref 70–110)
POCT GLUCOSE: 146 MG/DL (ref 70–110)
POCT GLUCOSE: 146 MG/DL (ref 70–110)
POCT GLUCOSE: 147 MG/DL (ref 70–110)
POCT GLUCOSE: 149 MG/DL (ref 70–110)
POCT GLUCOSE: 150 MG/DL (ref 70–110)
POCT GLUCOSE: 155 MG/DL (ref 70–110)
POCT GLUCOSE: 173 MG/DL (ref 70–110)
POCT GLUCOSE: 180 MG/DL (ref 70–110)
POCT GLUCOSE: 181 MG/DL (ref 70–110)
POCT GLUCOSE: 189 MG/DL (ref 70–110)
POCT GLUCOSE: 192 MG/DL (ref 70–110)
POCT GLUCOSE: 196 MG/DL (ref 70–110)
POIKILOCYTOSIS BLD QL SMEAR: SLIGHT
POLYCHROMASIA BLD QL SMEAR: ABNORMAL
POTASSIUM BLD-SCNC: 5 MMOL/L (ref 3.5–5.1)
POTASSIUM BLD-SCNC: 5.1 MMOL/L (ref 3.5–5.1)
POTASSIUM BLD-SCNC: 5.3 MMOL/L (ref 3.5–5.1)
POTASSIUM BLD-SCNC: 5.4 MMOL/L (ref 3.5–5.1)
POTASSIUM SERPL-SCNC: 4.6 MMOL/L (ref 3.5–5.1)
POTASSIUM SERPL-SCNC: 4.8 MMOL/L (ref 3.5–5.1)
POTASSIUM SERPL-SCNC: 5.1 MMOL/L (ref 3.5–5.1)
POTASSIUM SERPL-SCNC: 5.1 MMOL/L (ref 3.5–5.1)
POTASSIUM SERPL-SCNC: 5.4 MMOL/L (ref 3.5–5.1)
PREALB SERPL-MCNC: 19 MG/DL (ref 20–43)
PROT SERPL-MCNC: 5.2 G/DL (ref 6–8.4)
PROT SERPL-MCNC: 5.3 G/DL (ref 6–8.4)
PROT SERPL-MCNC: 5.5 G/DL (ref 6–8.4)
PROT SERPL-MCNC: 5.6 G/DL (ref 6–8.4)
PROT SERPL-MCNC: 5.7 G/DL (ref 6–8.4)
PROTHROMBIN TIME: 10.9 SEC (ref 9–12.5)
PROTHROMBIN TIME: 11 SEC (ref 9–12.5)
PROTHROMBIN TIME: 11 SEC (ref 9–12.5)
PROTHROMBIN TIME: 11.5 SEC (ref 9–12.5)
PROTHROMBIN TIME: 11.8 SEC (ref 9–12.5)
RBC # BLD AUTO: 2.43 M/UL (ref 4.6–6.2)
RBC # BLD AUTO: 2.68 M/UL (ref 4.6–6.2)
RBC # BLD AUTO: 2.69 M/UL (ref 4.6–6.2)
RBC # BLD AUTO: 2.83 M/UL (ref 4.6–6.2)
RBC # BLD AUTO: 3.03 M/UL (ref 4.6–6.2)
SAMPLE: ABNORMAL
SAMPLE: NORMAL
SITE: ABNORMAL
SITE: NORMAL
SODIUM BLD-SCNC: 138 MMOL/L (ref 136–145)
SODIUM BLD-SCNC: 138 MMOL/L (ref 136–145)
SODIUM BLD-SCNC: 139 MMOL/L (ref 136–145)
SODIUM BLD-SCNC: 140 MMOL/L (ref 136–145)
SODIUM BLD-SCNC: 140 MMOL/L (ref 136–145)
SODIUM SERPL-SCNC: 140 MMOL/L (ref 136–145)
SODIUM SERPL-SCNC: 141 MMOL/L (ref 136–145)
SODIUM SERPL-SCNC: 141 MMOL/L (ref 136–145)
SP02: 100
SPECIMEN SOURCE: NORMAL
SPHEROCYTES BLD QL SMEAR: ABNORMAL
STOMATOCYTES BLD QL SMEAR: PRESENT
VANCOMYCIN TROUGH SERPL-MCNC: 7.8 UG/ML (ref 10–22)
VT: 490
WBC # BLD AUTO: 10.55 K/UL (ref 3.9–12.7)
WBC # BLD AUTO: 11.13 K/UL (ref 3.9–12.7)
WBC # BLD AUTO: 11.86 K/UL (ref 3.9–12.7)
WBC # BLD AUTO: 13.75 K/UL (ref 3.9–12.7)
WBC # BLD AUTO: 9.67 K/UL (ref 3.9–12.7)

## 2023-12-02 PROCEDURE — 25000003 PHARM REV CODE 250

## 2023-12-02 PROCEDURE — 83605 ASSAY OF LACTIC ACID: CPT

## 2023-12-02 PROCEDURE — 85025 COMPLETE CBC W/AUTO DIFF WBC: CPT | Mod: 91 | Performed by: THORACIC SURGERY (CARDIOTHORACIC VASCULAR SURGERY)

## 2023-12-02 PROCEDURE — 63600175 PHARM REV CODE 636 W HCPCS

## 2023-12-02 PROCEDURE — 85027 COMPLETE CBC AUTOMATED: CPT | Performed by: STUDENT IN AN ORGANIZED HEALTH CARE EDUCATION/TRAINING PROGRAM

## 2023-12-02 PROCEDURE — 80202 ASSAY OF VANCOMYCIN: CPT

## 2023-12-02 PROCEDURE — 25000003 PHARM REV CODE 250: Performed by: STUDENT IN AN ORGANIZED HEALTH CARE EDUCATION/TRAINING PROGRAM

## 2023-12-02 PROCEDURE — 85384 FIBRINOGEN ACTIVITY: CPT

## 2023-12-02 PROCEDURE — 99292 CRITICAL CARE ADDL 30 MIN: CPT | Mod: FS,,, | Performed by: PHYSICIAN ASSISTANT

## 2023-12-02 PROCEDURE — 63600367 HC NITRIC OXIDE PER HOUR

## 2023-12-02 PROCEDURE — 80053 COMPREHEN METABOLIC PANEL: CPT | Mod: 91 | Performed by: STUDENT IN AN ORGANIZED HEALTH CARE EDUCATION/TRAINING PROGRAM

## 2023-12-02 PROCEDURE — 99900035 HC TECH TIME PER 15 MIN (STAT)

## 2023-12-02 PROCEDURE — 82330 ASSAY OF CALCIUM: CPT

## 2023-12-02 PROCEDURE — 83735 ASSAY OF MAGNESIUM: CPT | Performed by: STUDENT IN AN ORGANIZED HEALTH CARE EDUCATION/TRAINING PROGRAM

## 2023-12-02 PROCEDURE — 84132 ASSAY OF SERUM POTASSIUM: CPT

## 2023-12-02 PROCEDURE — 85730 THROMBOPLASTIN TIME PARTIAL: CPT | Mod: 91 | Performed by: THORACIC SURGERY (CARDIOTHORACIC VASCULAR SURGERY)

## 2023-12-02 PROCEDURE — 85610 PROTHROMBIN TIME: CPT | Performed by: STUDENT IN AN ORGANIZED HEALTH CARE EDUCATION/TRAINING PROGRAM

## 2023-12-02 PROCEDURE — 84100 ASSAY OF PHOSPHORUS: CPT | Mod: 91 | Performed by: THORACIC SURGERY (CARDIOTHORACIC VASCULAR SURGERY)

## 2023-12-02 PROCEDURE — 94640 AIRWAY INHALATION TREATMENT: CPT

## 2023-12-02 PROCEDURE — 80053 COMPREHEN METABOLIC PANEL: CPT | Mod: 91 | Performed by: THORACIC SURGERY (CARDIOTHORACIC VASCULAR SURGERY)

## 2023-12-02 PROCEDURE — 84295 ASSAY OF SERUM SODIUM: CPT

## 2023-12-02 PROCEDURE — 93750 INTERROGATION VAD IN PERSON: CPT | Mod: ,,, | Performed by: INTERNAL MEDICINE

## 2023-12-02 PROCEDURE — 85730 THROMBOPLASTIN TIME PARTIAL: CPT | Mod: 91 | Performed by: STUDENT IN AN ORGANIZED HEALTH CARE EDUCATION/TRAINING PROGRAM

## 2023-12-02 PROCEDURE — 27000248 HC VAD-ADDITIONAL DAY

## 2023-12-02 PROCEDURE — 27100171 HC OXYGEN HIGH FLOW UP TO 24 HOURS

## 2023-12-02 PROCEDURE — P9017 PLASMA 1 DONOR FRZ W/IN 8 HR: HCPCS

## 2023-12-02 PROCEDURE — 99900026 HC AIRWAY MAINTENANCE (STAT)

## 2023-12-02 PROCEDURE — 94761 N-INVAS EAR/PLS OXIMETRY MLT: CPT | Mod: XB

## 2023-12-02 PROCEDURE — 85610 PROTHROMBIN TIME: CPT | Mod: 91 | Performed by: THORACIC SURGERY (CARDIOTHORACIC VASCULAR SURGERY)

## 2023-12-02 PROCEDURE — 85027 COMPLETE CBC AUTOMATED: CPT | Mod: 91 | Performed by: THORACIC SURGERY (CARDIOTHORACIC VASCULAR SURGERY)

## 2023-12-02 PROCEDURE — 82803 BLOOD GASES ANY COMBINATION: CPT

## 2023-12-02 PROCEDURE — 37799 UNLISTED PX VASCULAR SURGERY: CPT

## 2023-12-02 PROCEDURE — 25000003 PHARM REV CODE 250: Performed by: THORACIC SURGERY (CARDIOTHORACIC VASCULAR SURGERY)

## 2023-12-02 PROCEDURE — C9113 INJ PANTOPRAZOLE SODIUM, VIA: HCPCS

## 2023-12-02 PROCEDURE — 84100 ASSAY OF PHOSPHORUS: CPT | Mod: 91 | Performed by: STUDENT IN AN ORGANIZED HEALTH CARE EDUCATION/TRAINING PROGRAM

## 2023-12-02 PROCEDURE — 83050 HGB METHEMOGLOBIN QUAN: CPT

## 2023-12-02 PROCEDURE — 83735 ASSAY OF MAGNESIUM: CPT | Mod: 91 | Performed by: THORACIC SURGERY (CARDIOTHORACIC VASCULAR SURGERY)

## 2023-12-02 PROCEDURE — 85384 FIBRINOGEN ACTIVITY: CPT | Mod: 91 | Performed by: THORACIC SURGERY (CARDIOTHORACIC VASCULAR SURGERY)

## 2023-12-02 PROCEDURE — 99499 UNLISTED E&M SERVICE: CPT | Mod: ,,, | Performed by: ANESTHESIOLOGY

## 2023-12-02 PROCEDURE — 20000000 HC ICU ROOM

## 2023-12-02 PROCEDURE — 85007 BL SMEAR W/DIFF WBC COUNT: CPT | Mod: 91 | Performed by: THORACIC SURGERY (CARDIOTHORACIC VASCULAR SURGERY)

## 2023-12-02 PROCEDURE — 99291 CRITICAL CARE FIRST HOUR: CPT | Mod: FS,,, | Performed by: PHYSICIAN ASSISTANT

## 2023-12-02 PROCEDURE — 94003 VENT MGMT INPAT SUBQ DAY: CPT

## 2023-12-02 PROCEDURE — 85025 COMPLETE CBC W/AUTO DIFF WBC: CPT | Performed by: STUDENT IN AN ORGANIZED HEALTH CARE EDUCATION/TRAINING PROGRAM

## 2023-12-02 PROCEDURE — 99233 SBSQ HOSP IP/OBS HIGH 50: CPT | Mod: ,,, | Performed by: NURSE PRACTITIONER

## 2023-12-02 PROCEDURE — 25000242 PHARM REV CODE 250 ALT 637 W/ HCPCS

## 2023-12-02 PROCEDURE — 63600175 PHARM REV CODE 636 W HCPCS: Performed by: STUDENT IN AN ORGANIZED HEALTH CARE EDUCATION/TRAINING PROGRAM

## 2023-12-02 PROCEDURE — 85007 BL SMEAR W/DIFF WBC COUNT: CPT | Performed by: STUDENT IN AN ORGANIZED HEALTH CARE EDUCATION/TRAINING PROGRAM

## 2023-12-02 PROCEDURE — 85014 HEMATOCRIT: CPT

## 2023-12-02 RX ORDER — ALBUTEROL SULFATE 2.5 MG/.5ML
10 SOLUTION RESPIRATORY (INHALATION) ONCE
Status: COMPLETED | OUTPATIENT
Start: 2023-12-02 | End: 2023-12-02

## 2023-12-02 RX ORDER — PANTOPRAZOLE SODIUM 40 MG/10ML
40 INJECTION, POWDER, LYOPHILIZED, FOR SOLUTION INTRAVENOUS DAILY
Status: DISCONTINUED | OUTPATIENT
Start: 2023-12-02 | End: 2023-12-04

## 2023-12-02 RX ADMIN — HYDROMORPHONE HYDROCHLORIDE 0.5 MG: 0.5 INJECTION, SOLUTION INTRAMUSCULAR; INTRAVENOUS; SUBCUTANEOUS at 11:12

## 2023-12-02 RX ADMIN — PROPOFOL 50 MCG/KG/MIN: 10 INJECTION, EMULSION INTRAVENOUS at 08:12

## 2023-12-02 RX ADMIN — HYDROMORPHONE HYDROCHLORIDE 0.5 MG: 0.5 INJECTION, SOLUTION INTRAMUSCULAR; INTRAVENOUS; SUBCUTANEOUS at 05:12

## 2023-12-02 RX ADMIN — PROPOFOL 50 MCG/KG/MIN: 10 INJECTION, EMULSION INTRAVENOUS at 04:12

## 2023-12-02 RX ADMIN — FERROUS GLUCONATE 324 MG: 324 TABLET ORAL at 08:12

## 2023-12-02 RX ADMIN — ALBUTEROL SULFATE 10 MG: 2.5 SOLUTION RESPIRATORY (INHALATION) at 12:12

## 2023-12-02 RX ADMIN — SENNOSIDES 8.6 MG: 8.6 TABLET, FILM COATED ORAL at 08:12

## 2023-12-02 RX ADMIN — CEFEPIME 1 G: 1 INJECTION, POWDER, FOR SOLUTION INTRAMUSCULAR; INTRAVENOUS at 05:12

## 2023-12-02 RX ADMIN — CEFEPIME 1 G: 1 INJECTION, POWDER, FOR SOLUTION INTRAMUSCULAR; INTRAVENOUS at 08:12

## 2023-12-02 RX ADMIN — CEFEPIME 1 G: 1 INJECTION, POWDER, FOR SOLUTION INTRAMUSCULAR; INTRAVENOUS at 12:12

## 2023-12-02 RX ADMIN — HYDROMORPHONE HYDROCHLORIDE 0.5 MG: 0.5 INJECTION, SOLUTION INTRAMUSCULAR; INTRAVENOUS; SUBCUTANEOUS at 07:12

## 2023-12-02 RX ADMIN — MUPIROCIN: 20 OINTMENT TOPICAL at 08:12

## 2023-12-02 RX ADMIN — PANTOPRAZOLE SODIUM 40 MG: 40 INJECTION, POWDER, FOR SOLUTION INTRAVENOUS at 08:12

## 2023-12-02 RX ADMIN — INSULIN HUMAN 10 UNITS: 100 INJECTION, SOLUTION PARENTERAL at 01:12

## 2023-12-02 RX ADMIN — FAMOTIDINE 20 MG: 10 INJECTION, SOLUTION INTRAVENOUS at 08:12

## 2023-12-02 RX ADMIN — INSULIN HUMAN 1 UNITS/HR: 1 INJECTION, SOLUTION INTRAVENOUS at 12:12

## 2023-12-02 RX ADMIN — SODIUM CHLORIDE 10 MG/HR: 9 INJECTION, SOLUTION INTRAVENOUS at 04:12

## 2023-12-02 RX ADMIN — FENTANYL CITRATE 25 MCG: 50 INJECTION INTRAMUSCULAR; INTRAVENOUS at 03:12

## 2023-12-02 RX ADMIN — EPINEPHRINE 0.08 MCG/KG/MIN: 1 INJECTION INTRAMUSCULAR; INTRAVENOUS; SUBCUTANEOUS at 04:12

## 2023-12-02 RX ADMIN — OXYCODONE HYDROCHLORIDE 10 MG: 10 TABLET ORAL at 12:12

## 2023-12-02 RX ADMIN — OXYCODONE HYDROCHLORIDE 10 MG: 10 TABLET ORAL at 04:12

## 2023-12-02 RX ADMIN — CHLOROTHIAZIDE SODIUM 500 MG: 500 INJECTION, POWDER, LYOPHILIZED, FOR SOLUTION INTRAVENOUS at 10:12

## 2023-12-02 RX ADMIN — PROPOFOL 40 MCG/KG/MIN: 10 INJECTION, EMULSION INTRAVENOUS at 12:12

## 2023-12-02 RX ADMIN — AMIODARONE HYDROCHLORIDE 400 MG: 200 TABLET ORAL at 08:12

## 2023-12-02 RX ADMIN — FENTANYL CITRATE 25 MCG: 50 INJECTION INTRAMUSCULAR; INTRAVENOUS at 01:12

## 2023-12-02 RX ADMIN — NICARDIPINE HYDROCHLORIDE 5 MG/HR: 0.2 INJECTION, SOLUTION INTRAVENOUS at 09:12

## 2023-12-02 RX ADMIN — OXYCODONE HYDROCHLORIDE 10 MG: 10 TABLET ORAL at 08:12

## 2023-12-02 RX ADMIN — DOCUSATE SODIUM 200 MG: 100 CAPSULE, LIQUID FILLED ORAL at 08:12

## 2023-12-02 RX ADMIN — FENTANYL CITRATE 25 MCG: 50 INJECTION INTRAMUSCULAR; INTRAVENOUS at 05:12

## 2023-12-02 RX ADMIN — PROPOFOL 40 MCG/KG/MIN: 10 INJECTION, EMULSION INTRAVENOUS at 04:12

## 2023-12-02 RX ADMIN — ATORVASTATIN CALCIUM 40 MG: 40 TABLET, FILM COATED ORAL at 08:12

## 2023-12-02 RX ADMIN — EPINEPHRINE 0.08 MCG/KG/MIN: 1 INJECTION INTRAMUSCULAR; INTRAVENOUS; SUBCUTANEOUS at 05:12

## 2023-12-02 NOTE — PROGRESS NOTES
[facility]  Heart Transplant  Progress Note    Patient Name: Radha Abbott  MRN: [id number]  Admission Date: 2023  Hospital Length of Stay: 23 days  Attending Physician: Yuri Washington MD  Primary Care Provider: Vasu Kong MD  Principal Problem:Acute on chronic combined systolic and diastolic CHF, NYHA class 4    Subjective:   Interval History: Patient POD1 s/p HM3 LVAD implant. Good UOP this + cc/hr since 4 AM on lasix 7.5 mg/hr. CVP down to 7. Epi down to .08, remains on  5, Ketty 20.       Continuous Infusions:   sodium chloride 0.9% 10 mL/hr at 23    DOBUTamine 5 mcg/kg/min (23)    EPINEPHrine 0.08 mcg/kg/min (23)    furosemide (LASIX) 2 mg/mL continuous infusion (non-titrating) 10 mg/hr (23)    insulin regular 1 units/mL infusion orderable (CTS POST-OP) 1.4 Units/hr (23)    nicardipine 5 mg/hr (23)    nitric oxide gas      propofoL 50 mcg/kg/min (23)     Scheduled Meds:   amiodarone  400 mg Oral Daily    atorvastatin  40 mg Oral Daily    ceFEPime (MAXIPIME) IVPB  1 g Intravenous Q8H    dextrose 10%  25 g Intravenous Once    docusate sodium  200 mg Oral QHS    famotidine (PF)  20 mg Intravenous Daily    ferrous gluconate  324 mg Oral Daily with breakfast    lactulose  15 g Oral Once    mupirocin   Nasal BID    pantoprazole  40 mg Intravenous Daily    polyethylene glycol  17 g Oral Daily    senna  8.6 mg Oral Daily     PRN Meds:acetaminophen, albumin human 5%, albuterol sulfate, baclofen, bisacodyL, bisacodyL, dextrose 10%, dextrose 10%, dextrose 10%, [] fentaNYL **FOLLOWED BY** fentaNYL, gabapentin, HYDROmorphone, magnesium hydroxide 400 mg/5 ml, magnesium sulfate IVPB, mupirocin, ondansetron, oxyCODONE, oxyCODONE, potassium chloride, potassium chloride, senna-docusate 8.6-50 mg, sodium chloride 0.9%    Review of patient's allergies indicates:  No Known Allergies  Objective:     Vital  Signs (Most Recent):  Temp: 99.1 °F (37.3 °C) (12/02/23 0700)  Pulse: 88 (12/02/23 0900)  Resp: 18 (12/02/23 0900)  BP: (!) 88/0 (12/02/23 0315)  SpO2: 100 % (12/02/23 0900) Vital Signs (24h Range):  Temp:  [97.9 °F (36.6 °C)-100.1 °F (37.8 °C)] 99.1 °F (37.3 °C)  Pulse:  [] 88  Resp:  [18-24] 18  SpO2:  [88 %-100 %] 100 %  BP: (80-88)/(0) 88/0  Arterial Line BP: (73-98)/(52-81) 88/66     Patient Vitals for the past 72 hrs (Last 3 readings):   Weight   12/01/23 0344 77.5 kg (170 lb 13.7 oz)   11/30/23 0601 77.5 kg (170 lb 13.7 oz)   11/30/23 0400 78.4 kg (172 lb 14.4 oz)       Body mass index is 23.17 kg/m².      Intake/Output Summary (Last 24 hours) at 12/2/2023 0955  Last data filed at 12/2/2023 0900  Gross per 24 hour   Intake 5097.65 ml   Output 2822 ml   Net 2275.65 ml         Hemodynamic Parameters:  PAP: (25-38)/(19-22) 25/20  PAP (Mean):  [23 mmHg-27 mmHg] 23 mmHg    Telemetry: NSR        Physical Exam  Constitutional:       Comments: Intubated and sedated   HENT:      Head: Normocephalic and atraumatic.   Cardiovascular:      Rate and Rhythm: Normal rate and regular rhythm.      Comments: LVAD hum  Pulmonary:      Effort: Pulmonary effort is normal.      Breath sounds: Normal breath sounds.      Comments: Mechanically ventilated  Chest:      Comments: CT x2  Abdominal:      General: Abdomen is flat. Bowel sounds are normal. There is no distension.      Palpations: Abdomen is soft.   Musculoskeletal:         General: Normal range of motion.      Cervical back: Neck supple.   Skin:     General: Skin is warm and dry.   Neurological:      Comments: sedated            Significant Labs:  CBC:  Recent Labs   Lab 12/01/23  2358 12/01/23  2359 12/02/23  0407 12/02/23  0800 12/02/23  0800   WBC 9.67  --  10.55 11.13  --    RBC 3.03*  --  2.83* 2.43*  --    HGB 8.9*  --  8.1* 7.2*  --    HCT 24.9*   < > 22.7* 20.2* 23*     --  145* 142*  --    MCV 82  --  80* 83  --    MCH 29.4  --  28.6 29.6  --    MCHC  35.7  --  35.7 35.6  --     < > = values in this interval not displayed.       BNP:  Recent Labs   Lab 11/29/23  1834   *       CMP:  Recent Labs   Lab 12/01/23 2358 12/02/23  0407 12/02/23  0800   * 114* 183*   CALCIUM 8.0* 8.2* 8.2*   ALBUMIN 2.7* 2.7* 2.7*   PROT 5.2* 5.3* 5.5*    140 140   K 5.4* 5.1 5.1   CO2 21* 21* 21*    109 108   BUN 46* 47* 46*   CREATININE 2.2* 2.4* 2.3*   ALKPHOS 48* 50* 52*   ALT 28 30 32   AST 85* 88* 89*   BILITOT 1.8* 1.4* 1.3*        Coagulation:   Recent Labs   Lab 12/01/23 2358 12/02/23  0407 12/02/23  0800   INR 1.1 1.1 1.0   APTT 28.5 25.8 26.2       LDH:  Recent Labs   Lab 12/01/23  1851   *     Microbiology:  Microbiology Results (last 7 days)       Procedure Component Value Units Date/Time    Blood culture [[id number]] Collected: 11/28/23 1452    Order Status: Completed Specimen: Blood from Peripheral, Upper Arm, Right Updated: 12/01/23 1612     Blood Culture, Routine No growth to date      No Growth to date      No Growth to date    Blood culture [[id number]] Collected: 11/28/23 1450    Order Status: Completed Specimen: Blood from Peripheral, Forearm, Right Updated: 12/01/23 1612     Blood Culture, Routine No growth to date      No Growth to date      No Growth to date            BMP:   Recent Labs   Lab 12/02/23  0800   *      K 5.1      CO2 21*   BUN 46*   CREATININE 2.3*   CALCIUM 8.2*   MG 2.4       I have reviewed all pertinent labs within the past 24 hours.    Estimated Creatinine Clearance: 37 mL/min (A) (based on SCr of 2.3 mg/dL (H)).    Diagnostic Results:  I have reviewed all pertinent imaging results/findings within the past 24 hours.  Assessment and Plan:     63 year old male with hx of CAD s/p 3v CABG (unclear anatomy, 2009), ICM with a recent EF of 15-20% s/p ICD (elsa 2009), DM2 (a1c 7.7), HTN, HLD, Vfib on amio who presents to the ED with CC of SOB     Pt was recently admitted to [facility] [place] as a transfer.   He was started on a Lasix gtt and did well.  Started on gDMT and discharged home on  5 with plans to follow up in [facility] clinic for ongoing transplant evaluation at another facility.  He says that about a few days ago he started to notice LE swelling.  This turned into Nelson and orthopnea.  He says he can't walk to the bathroom without getting SOB.  He also complains of weight gain.  He takes torsemide 20mg BID at home and was told to trial additional Lasix which he did without any improvement.  He was rx metolazone but has not been filled.  He came to the ED     In the ED he was AF with stable VS on RA.  CBC showing chronic anemia.  CMP notable for acute on chronic CKD with baseline around 2.1 and Cr now 2.6.  BNP elevated.  Lactate neg.  CXR showing pulmonary edema.  I evaluated the pt at the bedside.  Bedside TTE showing CVP >15.  He was subsequently admitted to the CCU for diuresis.     He was continued on his home  and was started on a lasix gtt, which he responded well to overnight (net -1700cc. He feels much better this morning as well. Our transplant coordinators have been working on insurance approval and he is now being transferred to [facility] service for transplant evaluation.     * Acute on chronic combined systolic and diastolic CHF, NYHA class 4  Pt with known ICM with an EF of 25% on home  presented with decompensated HF.  Not in cardiogenic shock    RHC 11/14: RA 14, PA 70/35, PCWP 32, CO 4.1, CI 2.1.   Repeat RHC 11/20 RA RA  20  PA 60/29 (39)  PCWP 29    CO  4.6 l/min  CI  2.3 l/min/m2  2D echo 11/21 showed EF 15-20%, mild RV dysfunction, mild MR, LVEDD 6.9     Repeat on 11/24 showed EF 10-15%, RV mildly reduced, mild-mod MR, LVEDD 6.42  - Home  gtt at 5  - home GDMT held in anticipation of lvad  - home diuretics: Was taking lasix 40 bid  - Bridged to LVAD with IABP       -PT/OT and nutrition   -s/p LVAD on 12/1  - CTS primary          LVAD (left ventricular assist device) present  Procedure:  Device Interrogation Including analysis of device parameters  Current Settings: Ventricular Assist Device  Review of device function is stable/unstable stable        12/2/2023     9:00 AM 12/2/2023     8:00 AM 12/2/2023     7:00 AM 12/2/2023     6:00 AM 12/2/2023     5:00 AM 12/2/2023     4:00 AM 12/2/2023     3:00 AM   TXP LVAD INTERROGATIONS   Type HeartMate3 HeartMate3 HeartMate3 HeartMate3 HeartMate3 HeartMate3 HeartMate3   Flow 3.6 3.6 3.4 3.5 3.5 3.4 3.5   Speed 4800 480 4800 4800 4800 4800 4800   PI 5.3 4.8 5.3 5.2 5.5 5.7 5.9   Power ([place]) 3.1 3.1 3.1 3.1 3.1 3.2 3.1   LSL 4400 4400 4400 4400 4400 4400 4400   Pulsatility Intermittent pulse  Intermittent pulse Intermittent pulse Intermittent pulse Intermittent pulse Intermittent pulse         PAF (paroxysmal atrial fibrillation)  Known hx of pAF. In sinus rhythm on admission, but 1 run of AF RVR overnight. He spontaneously converted.  - Continue home amiodarone 400mg qd  - A/c per primary team. No anticoagulation started yet. Patient coagulopathic intraoperatively received 7u pRBC      IVÁN (acute kidney injury)  IVÁN on CKD2. Baseline Cr of 1.7-2.0.   - Hold ARNi, entresto  -Trend BMPs daily post VAD  - UOP appears good this AM    Type 2 diabetes mellitus without complication, without long-term current use of insulin  -A1c 7.6, not insulin dependent  - MDSSI  -diabetic diet ordered/Boost glucose control   - Endocrine following, appreciate assistance with blood glucose management    CAD (coronary artery disease)  -S/p 3vCABG in 2009  - continue home ASA, HI statin    Ventricular tachycardia  Hx VT s/p ICD placement (medtronic 2009)  - Continue home amio 400mg qd    Uninterrupted Critical Care/Counseling Time (not including procedures): 60 min.       Jimy An PA-C  Heart Transplant  [facility]

## 2023-12-02 NOTE — ASSESSMENT & PLAN NOTE
Neuro/Psych:   -- Sedation: propofol 50mcg/kg/min  -- Pain:    -- fentanyl PRN, dilaudid 0.5 q1 PRN while intubated, Oxy PRN  -- wake up for neuro exam today              Cardiac:   -- S/P redo sternotomy LVAD with Dr. Washington on 12/01, chest remaining open  -- Plans for chest closure pending clinical progression on Monday   -- MAP Goal: 70-80, closer to 70  -- Cleviprex/Cardene PRN  -- Pressors: epi 0.8   -- levo, vaso, shaji off  -- Anti-HTNs: Will start when appropriate  -- Rhythm: NSR  -- amiodarone 400mg daily  -- LVAD heartmate III: RPM 4800, flow 2.5-2.8  -- Beta blocker: Will start when appropriate  -- Statin: Atorvastatin 40 mg QD  -- No ASA at this time      Pulmonary:   -- Goal SpO2 >92%  -- Intubated, minimal vent settings  -- Ketty weaned to 15. Goal to wean to 10 tonight and to 5 by return to OR Monday   -- Chest Tubes x 2 (2 Meds): output significantly slowed, 10/hr   - right: 470 (960)   - left: 57 (162)  -- q6 ABGs and lactates      Renal:  -- Trend BUN/Cr   -- Maintain Hampton, record strict Is/Os  -- lasix gtt 10  -- UOP /hr, net + 3.2L  -- goal to be net neg 1.5-2L/24hr      Recent Labs   Lab 12/01/23  0325 12/01/23  1531 12/01/23  1851   BUN 52* 41* 44*   CREATININE 2.1* 1.7* 1.9*           FEN / GI:   -- Daily CMP, PRN K/Mag/Phos per protocol   -- Replace electrolytes as needed  -- Nutrition: NPO  -- OG tube ok for meds  -- Bowel Regimen: Miralax, docusate      ID:   -- Afebrile  -- WBC stable  -- Abx: cefepime. NO vanc  -- vanc level 7.8    Recent Labs   Lab 12/01/23  1202 12/01/23  1531 12/01/23  1851   WBC 11.82 14.69* 12.09           Heme/Onc:   -- Hgb 8.7 pre-operatively  -- received 7 RBC, 2 FFP, 1 platelet, 10 cry intra-op  -- 12/1: 1 RBC, 4 FFP, 2 plts, 1 cryo  -- q6 CBC and coags  -- q4 fibrinogen - keep fibrinogen >300  -- holding ASA    Recent Labs   Lab 11/30/23  0413 12/01/23  0325 12/01/23  1202 12/01/23  1531 12/01/23  1851   HGB 8.4* 7.9* 6.8* 9.6* 7.1*    280 152 137*  163   APTT 48.8*  48.8* 50.3*  --  35.0* 29.6   INR 1.1  --   --  1.2 1.1           Endocrine:   -- CTS Goal -140  -- HgbA1c: 7.6  -- Endocrinology consulted for insulin management      PPx:   Feeding: NPO  Analgesia/Sedation: fent, dilaudid / Prop  Thromboembolic Prevention: SCDs  HOB >30: Yes  Stress Ulcer: pantoprazole   Glucose Control: Yes, insulin management per Endocrinology  Bowel reg:   Invasive Lines/Drains/Airway:   ETT  OGT  Left radial arterial line  RIJ mac w/ slick + swan   LIJ quad CVC  Hampton  Chest Tubes: 2 Mediastinal  LVAD     Dispo/Code Status/Palliative:     - Continue SICU Care    - Full Code  .

## 2023-12-02 NOTE — NURSING
Patient reviewed with Dr. Washington via telephone.     MAP > 70; Cardene if hypertensive.   Wean Epi to 0.08 by morning, as tolerated.  Hold sedation vacation this shift, as patient followed commands during the day.   Add fibrinogen one, now.   Transfuse  PRBC x1  FFP x2  Cryo x1

## 2023-12-02 NOTE — PROGRESS NOTES
[facility]  Heart Transplant  Progress Note    Patient Name: Radha Abbott  MRN: [id number]  Admission Date: 11/9/2023  Hospital Length of Stay: 22 days  Attending Physician: Yuri Washington MD  Primary Care Provider: Vasu Kong MD  Principal Problem:Acute on chronic combined systolic and diastolic CHF, NYHA class 4    Subjective:   Interval History: Patient underwent HM3 LVAD implant today. Returned to the SICU supported on epi, , Ketty. Flows reportedly dropped to 2.2 responded well to albumin and HTN control.       Continuous Infusions:   sodium chloride 0.9% 5 mL/hr at 12/01/23 0601    sodium chloride 0.9% 10 mL/hr at 12/01/23 1720    DOBUTamine 5 mcg/kg/min (12/01/23 1700)    EPINEPHrine 0.1 mcg/kg/min (12/01/23 1700)    furosemide (LASIX) 2 mg/mL continuous infusion (non-titrating)      heparin (porcine) in D5W Stopped (12/01/23 0655)    insulin regular 1 units/mL infusion orderable (DKA) 5 Units/hr (12/01/23 1700)    nitric oxide gas      NORepinephrine bitartrate-D5W      propofoL 50 mcg/kg/min (12/01/23 1700)    vasopressin       Scheduled Meds:   amiodarone  400 mg Oral Daily    aspirin  325 mg Per NG tube Daily    atorvastatin  40 mg Oral Daily    ceFEPime (MAXIPIME) IVPB  1 g Intravenous Q8H    docusate sodium  200 mg Oral QHS    [START ON 12/2/2023] famotidine (PF)  20 mg Intravenous Daily    [START ON 12/2/2023] ferrous gluconate  324 mg Oral Daily with breakfast    furosemide (LASIX) injection  40 mg Intravenous Once    lactulose  15 g Oral Once    magnesium oxide  400 mg Oral Daily    multivitamin  1 tablet Oral Daily    mupirocin  1 g Nasal BID    mupirocin   Nasal BID    pantoprazole  40 mg Oral Before breakfast    polyethylene glycol  17 g Oral Daily    senna  8.6 mg Oral Daily     PRN Meds:0.9%  NaCl infusion (for blood administration), 0.9%  NaCl infusion (for blood administration), 0.9%  NaCl infusion (for blood administration), acetaminophen, albumin human 5%,  albuterol sulfate, baclofen, bisacodyL, bisacodyL, dextrose 10%, dextrose 10%, dextrose 10%, dextrose 10%, gabapentin, heparin (PORCINE), heparin (PORCINE), magnesium hydroxide 400 mg/5 ml, magnesium sulfate IVPB, mupirocin, ondansetron, oxyCODONE, oxyCODONE, oxyCODONE, potassium chloride, potassium chloride, senna-docusate 8.6-50 mg, sodium chloride 0.9%    Review of patient's allergies indicates:  No Known Allergies  Objective:     Vital Signs (Most Recent):  Temp: 97.9 °F (36.6 °C) (12/01/23 1545)  Pulse: 87 (12/01/23 1744)  Resp: 18 (12/01/23 1744)  BP: (!) 109/56 (12/01/23 0601)  SpO2: 100 % (12/01/23 1744) Vital Signs (24h Range):  Temp:  [97.9 °F (36.6 °C)-99.4 °F (37.4 °C)] 97.9 °F (36.6 °C)  Pulse:  [] 87  Resp:  [14-24] 18  SpO2:  [88 %-100 %] 100 %  BP: (109-146)/(55-87) 109/56  Arterial Line BP: (76-95)/(52-69) 80/55     Patient Vitals for the past 72 hrs (Last 3 readings):   Weight   12/01/23 0344 77.5 kg (170 lb 13.7 oz)   11/30/23 0601 77.5 kg (170 lb 13.7 oz)   11/30/23 0400 78.4 kg (172 lb 14.4 oz)       Body mass index is 23.17 kg/m².      Intake/Output Summary (Last 24 hours) at 12/1/2023 1800  Last data filed at 12/1/2023 1700  Gross per 24 hour   Intake 3424.66 ml   Output 1355 ml   Net 2069.66 ml         Hemodynamic Parameters:  PAP: (31-38)/(19-22) 34/22  PAP (Mean):  [23 mmHg-27 mmHg] 27 mmHg    Telemetry: NSR        Physical Exam  Constitutional:       Comments: Intubated and sedated   HENT:      Head: Normocephalic and atraumatic.   Cardiovascular:      Rate and Rhythm: Normal rate and regular rhythm.      Comments: LVAD hum  Pulmonary:      Effort: Pulmonary effort is normal.      Breath sounds: Normal breath sounds.      Comments: Mechanically ventilated  Abdominal:      General: Abdomen is flat.   Musculoskeletal:         General: Normal range of motion.      Cervical back: Neck supple.   Skin:     General: Skin is warm and dry.   Neurological:      Comments: sedated       "    Significant Labs:  CBC:  Recent Labs   Lab 12/01/23  0325 12/01/23  0747 12/01/23  1202 12/01/23  1224 12/01/23  1531 12/01/23  1533 12/01/23  1644 12/01/23  1744   WBC 6.84  --  11.82  --  14.69*  --   --   --    RBC 2.87*  --  2.37*  --  3.28*  --   --   --    HGB 7.9*  --  6.8*  --  9.6*  --   --   --    HCT 24.9*   < > 21.4*   < > 27.7* 25* 22* 21*     --  152  --  137*  --   --   --    MCV 87  --  90  --  85  --   --   --    MCH 27.5  --  28.7  --  29.3  --   --   --    MCHC 31.7*  --  31.8*  --  34.7  --   --   --     < > = values in this interval not displayed.       BNP:  Recent Labs   Lab 11/29/23  1834   *       CMP:  Recent Labs   Lab 11/30/23 0413 11/30/23  1530 12/01/23 0325 12/01/23  1531   GLU 97 176* 102 202*   CALCIUM 9.4 9.2 8.8 7.9*   ALBUMIN 2.9*  --  2.8* 1.9*   PROT 7.2  --  6.7 4.4*   * 134* 135* 138   K 4.4 4.8 4.5 5.2*   CO2 24 21* 25 17*    103 101 111*   BUN 60* 58* 52* 41*   CREATININE 2.1* 2.3* 2.1* 1.7*   ALKPHOS 78  --  74 48*   ALT 23  --  26 23   AST 30  --  31 69*   BILITOT 0.5  --  0.4 1.7*        Coagulation:   Recent Labs   Lab 11/30/23 0413 12/01/23 0325 12/01/23  1531   INR 1.1  --  1.2   APTT 48.8*  48.8* 50.3* 35.0*       LDH:  No results for input(s): "LDH" in the last 72 hours.  Microbiology:  Microbiology Results (last 7 days)       Procedure Component Value Units Date/Time    Blood culture [[id number]] Collected: 11/28/23 1452    Order Status: Completed Specimen: Blood from Peripheral, Upper Arm, Right Updated: 12/01/23 1612     Blood Culture, Routine No growth to date      No Growth to date      No Growth to date    Blood culture [[id number]] Collected: 11/28/23 1450    Order Status: Completed Specimen: Blood from Peripheral, Forearm, Right Updated: 12/01/23 1612     Blood Culture, Routine No growth to date      No Growth to date      No Growth to date            BMP:   Recent Labs   Lab 12/01/23  1531   *      K 5.2*   CL " 111*   CO2 17*   BUN 41*   CREATININE 1.7*   CALCIUM 7.9*   MG 2.8*       I have reviewed all pertinent labs within the past 24 hours.    Estimated Creatinine Clearance: 50 mL/min (A) (based on SCr of 1.7 mg/dL (H)).    Diagnostic Results:  I have reviewed all pertinent imaging results/findings within the past 24 hours.   Assessment and Plan:     63 year old male with hx of CAD s/p 3v CABG (unclear anatomy, 2009), ICM with a recent EF of 15-20% s/p ICD (medtronik 2009), DM2 (a1c 7.7), HTN, HLD, Vfib on amio who presents to the ED with CC of SOB     Pt was recently admitted to [facility] [place] as a transfer.  He was started on a Lasix gtt and did well.  Started on gDMT and discharged home on  5 with plans to follow up in [facility] clinic for ongoing transplant evaluation at another facility.  He says that about a few days ago he started to notice LE swelling.  This turned into Nelson and orthopnea.  He says he can't walk to the bathroom without getting SOB.  He also complains of weight gain.  He takes torsemide 20mg BID at home and was told to trial additional Lasix which he did without any improvement.  He was rx metolazone but has not been filled.  He came to the ED     In the ED he was AF with stable VS on RA.  CBC showing chronic anemia.  CMP notable for acute on chronic CKD with baseline around 2.1 and Cr now 2.6.  BNP elevated.  Lactate neg.  CXR showing pulmonary edema.  I evaluated the pt at the bedside.  Bedside TTE showing CVP >15.  He was subsequently admitted to the CCU for diuresis.     He was continued on his home  and was started on a lasix gtt, which he responded well to overnight (net -1700cc. He feels much better this morning as well. Our transplant coordinators have been working on insurance approval and he is now being transferred to [facility] service for transplant evaluation.     * Acute on chronic combined systolic and diastolic CHF, NYHA class 4  Pt with known ICM with an EF of 25% on home  presented with  decompensated HF.  Not in cardiogenic shock    RHC 11/14: RA 14, PA 70/35, PCWP 32, CO 4.1, CI 2.1.   Repeat RHC 11/20 RA RA  20  PA 60/29 (39)  PCWP 29    CO  4.6 l/min  CI  2.3 l/min/m2  2D echo 11/21 showed EF 15-20%, mild RV dysfunction, mild MR, LVEDD 6.9     Repeat on 11/24 showed EF 10-15%, RV mildly reduced, mild-mod MR, LVEDD 6.42  - Home  gtt at 5  - home GDMT: entresto 24/26mg BID (on hold 2/2 IVÁN), Hydralazine 25 q8h being continued  - home diuretics: Was taking lasix 40 bid  - Bridge to LVAD with IABP       -PT/OT and nutrition   -s/p LVAD today (12/1)  - CTS primary          PAF (paroxysmal atrial fibrillation)  Known hx of pAF. In sinus rhythm on admission,  Continue amiodarone  Anticoagulation per primary team      IVÁN (acute kidney injury)  IVÁN on CKD2. Baseline Cr of 1.7-2.0.   - Hold ARNi, entresto  -Trend BMPs daily post VAD    Type 2 diabetes mellitus without complication, without long-term current use of insulin  -A1c 7.6, not insulin dependent  - MDSSI  - Endocrine following, appreciate assistance with blood glucose management    CAD (coronary artery disease)  -S/p 3vCABG in 2009  - continue home ASA, HI statin    Ventricular tachycardia  Hx VT s/p ICD placement (medtronic 2009)  - Continue home amio 400mg qd        Jimy An PA-C  Heart Transplant  [facility] - [facility]

## 2023-12-02 NOTE — CARE UPDATE
Plan of care reviewed with pt and spouse. Pt able to move all extremities when sedation is paused, not to commands. Pt in sinus rhythm, afebrile, 02 sats >99% on 10ppm of nitric, 40/5 on vent. Hampton with 100-150ml/hr. Chest tube output  in flowsheets, right medial with 100-140ml for shift, left medial with minimal  serosanguinous output. VAD numbers in flowsheets, no alarms. Most recent numbers, gtts and lab results shared with Dr. Washington, no changes at this time, wife updated by team, plan for chest closure on Monday, WCTM.

## 2023-12-02 NOTE — PLAN OF CARE
SICU PLAN OF CARE NOTE    Dx: Acute on chronic combined systolic and diastolic CHF, NYHA class 4    Shift Events: Admitted post VAD implant. Low flow alarms on admit, see note. 500 albumin. 1 amp bicarb. 1 FFP, 1 plt, plan for 1 PRBC and anoter FFP.     Goals of Care: Pending blood product transfusion.     Neuro: Sedated, Arouses to Voice, and Follows Commands and moves all extremities.     Cardiac: . MAP goal 70s. PA pressures 30s/10s CVP 9.     Vital Signs: BP (!) 109/56   Pulse 101   Temp 98.6 °F (37 °C) (Oral)   Resp 18   Ht 6' (1.829 m)   Wt 77.5 kg (170 lb 13.7 oz)   SpO2 100%   BMI 23.17 kg/m²     Respiratory: Ventilator 50% 5 PEEP.     Diet: NPO    Gtts: epi, dobutamine, lasix, propofol, insulin,     Urine Output: Urinary Catheter 605 cc/shift    Drains: R Meds Chest Tube, total output 380 cc /  shift Dark red sang                L meds Chest tube, total output 105 cc/shift SS output.     Bilateral soft wrist restraints in place. No injuries noted. Safety checks performed q 2 hrs.     Skin: Chest open, ioban in place. Chest tube dsg CDI, VAD dsg CDI. Heels with no breakdown, floated. Unable to turn due to chest being open.    PoC reviewed with pt and family. All questions answered/concerns addressed. See flowsheet for full assessment.

## 2023-12-02 NOTE — PT/OT/SLP PROGRESS
Physical Therapy      Patient Name:  Radha Abbott   MRN:  [id number]    Patient not seen today secondary to  (pt on hold due to being intubated, sedated and with his chest open. Pt is scheuled for chest closure 12/4.). Will follow-up at a later date/    12/2/2023  .

## 2023-12-02 NOTE — NURSING
We have shifted a potassium of 5.4 with Regular Insulin 10 Units and Albuterol 10mg. At this time we are holding D10 bolus, per Dr. Ferguson.     We have increased the Lasix infusion to 7.5mg/hr    Doppler pulses of the right foot remain faint. Right popliteal pulse is +2 by doppler. Dr. Jane at bedside examining himself. Will continue using Shannan Hugger to both lower extremeties.

## 2023-12-02 NOTE — NURSING
SICU PLAN OF CARE NOTE    Dx: Acute on chronic combined systolic and diastolic CHF, NYHA class 4    Goals of Care: Wean inotropes, per Dr. Washington. Chest closure potentially Sunday. Maintain MAP > 70. Cardene for HTN affecting LVAD flows. Diurese per Dr. Connors.     Vital Signs (last 12 hours):   Temp:  [98.4 °F (36.9 °C)-100.1 °F (37.8 °C)]   Pulse:  []   Resp:  [18-21]   BP: (80-88)/(0)   SpO2:  [100 %]   Arterial Line BP: (73-98)/(57-81)      Neuro:   Deferred sedation vacation for night shift, per Dr. Washington.   Patient followed commands, per day nurse report and documentation.   Patient does withdraw from pain and then towards the end of shift attempts to move extremities and open eyes spontaneously;   Propofol increased to vaibhav HTN, which causes drop in flows.   Fentanyl administered through shift empirically for pain as evidenced by HTN.     Cardiac: Sinus through shift.     Respiratory: Ventilator 40% & PEEP 5 with Nitric Oxide at 20ppm    Gtts: Epinephrine , Dobutamine, Lasix, Propofol, Insulin, and Cardene    Urine Output: Urinary Catheter 750 cc/shift    Drains: There are two mediastinal tubes. Both express thin, sanguineous drainage. Tube #1 is darker than #2.     Diet: NPO     VAD HM3 Speed 4800; Flows 3.0-3.5; PI  4.1-7.0; Power 3.1-3.2. No alarms. HCT updated.    Restraints Bilateral soft, wrist w/o signs of injury.        Skin: Limited skin assessment 2/2 open chest; unable to turn. No alterations in skin integrity appreciated. Heela offloaded with boots.     Shift Events:     Weaned Epi to 0.08, per TORB Dr. Washington. It was weaned over the course of several hours, which was not captured by Pump/Rate verify.   Patient was transfused with PRBCx1, CRYOx1 and FFPx2.   Mediastinal drainage has improved; see flowsheets.  He remains oliguric, despite increasing Lasix infusion to 7.5mg/hr; CVP approx 10-13 through shift.   Hyperkaliemia of 5.3 was treated by shifting with Regular Insulin 10 Units  "and Albuterol 10mg.   IABP removed from right groin in OR. Doppler pulses in the right foot and weaker then the left foot; improves when Shannan Hugger is in use; Dr. Jane aware and performed an independent assessment; no intervention required at this time.    Disregard any entry in the MAR indicating the administration of Levo; it was not being administered at shift change, nor was it ever administered overnight. The channel which had once been associated with Levo did not disassociate when used to infuse FFP, thus making the MAR reflect Levo administration. I have attempted to remove these "Levo" entries, and am making this nor for clarification.       "

## 2023-12-02 NOTE — ASSESSMENT & PLAN NOTE
Procedure: Device Interrogation Including analysis of device parameters  Current Settings: Ventricular Assist Device  Review of device function is stable/unstable stable        12/2/2023     9:00 AM 12/2/2023     8:00 AM 12/2/2023     7:00 AM 12/2/2023     6:00 AM 12/2/2023     5:00 AM 12/2/2023     4:00 AM 12/2/2023     3:00 AM   TXP LVAD INTERROGATIONS   Type HeartMate3 HeartMate3 HeartMate3 HeartMate3 HeartMate3 HeartMate3 HeartMate3   Flow 3.6 3.6 3.4 3.5 3.5 3.4 3.5   Speed 4800 480 4800 4800 4800 4800 4800   PI 5.3 4.8 5.3 5.2 5.5 5.7 5.9   Power ([place]) 3.1 3.1 3.1 3.1 3.1 3.2 3.1   LSL 4400 4400 4400 4400 4400 4400 4400   Pulsatility Intermittent pulse  Intermittent pulse Intermittent pulse Intermittent pulse Intermittent pulse Intermittent pulse

## 2023-12-02 NOTE — SUBJECTIVE & OBJECTIVE
Follow-up For: Procedure(s) (LRB):  INSERTION-LEFT VENTRICULAR ASSIST DEVICE (Left)  STERNOTOMY, REDO (N/A)  VALVULOPLASTY, MITRAL VALVE (N/A)  LYSIS, ADHESIONS  REPAIR, AORTIC VALVE (N/A)  CLOSURE, TEMPORARY (N/A)  ANGIOPLASTY-VENOUS ARTERY, RIGHT FEMORAL (Right)  REPAIR, ANEURYSM, ARTERY, FEMORAL (Right)    Post-Operative Day: Day of Surgery     Past Medical History:   Diagnosis Date    CAD (coronary artery disease)     Diabetes mellitus     HFrEF (heart failure with reduced ejection fraction)     ICD (implantable cardioverter-defibrillator) in place     MI, old        Past Surgical History:   Procedure Laterality Date    CARDIAC SURGERY      INSERTION OF INTRA-AORTIC BALLOON ASSIST DEVICE Right 11/21/2023    Procedure: INSERTION, INTRA-AORTIC BALLOON PUMP;  Surgeon: Finn Cohn MD;  Location: [facility] CATH LAB;  Service: Cardiology;  Laterality: Right;    PLACEMENT OF SWAN ROLANDO CATHETER WITH IMAGING GUIDANCE  11/20/2023    Procedure: INSERTION, CATHETER, SWAN-ROLANDO, WITH IMAGING GUIDANCE;  Surgeon: Sajan Hurley MD;  Location: [facility] CATH LAB;  Service: Cardiology;;    RIGHT HEART CATHETERIZATION Right 10/10/2023    Procedure: INSERTION, CATHETER, RIGHT HEART;  Surgeon: Bin Gandhi MD;  Location: [facility] CATH LAB;  Service: Cardiology;  Laterality: Right;    RIGHT HEART CATHETERIZATION Right 10/13/2023    Procedure: INSERTION, CATHETER, RIGHT HEART;  Surgeon: Walter Mcintyre MD;  Location: [facility] CATH LAB;  Service: Cardiology;  Laterality: Right;    RIGHT HEART CATHETERIZATION  11/13/2023    RIGHT HEART CATHETERIZATION Right 11/13/2023    Procedure: INSERTION, CATHETER, RIGHT HEART;  Surgeon: Juventino Bermudez Jr., MD;  Location: [facility] CATH LAB;  Service: Cardiology;  Laterality: Right;    RIGHT HEART CATHETERIZATION Right 11/20/2023    Procedure: INSERTION, CATHETER, RIGHT HEART;  Surgeon: Sajan Hurley MD;  Location: [facility] CATH LAB;  Service: Cardiology;  Laterality: Right;       Review  of patient's allergies indicates:  No Known Allergies    Family History    None       Tobacco Use    Smoking status: Every Day     Current packs/day: 0.50     Types: Cigarettes    Smokeless tobacco: Not on file   Substance and Sexual Activity    Alcohol use: Yes     Comment: rarely    Drug use: No    Sexual activity: Not on file      Review of Systems   Unable to perform ROS: Intubated     Objective:     Vital Signs (Most Recent):  Temp: 99.6 °F (37.6 °C) (12/01/23 1922)  Pulse: 103 (12/01/23 1922)  Resp: 18 (12/01/23 1922)  BP: (!) 109/56 (12/01/23 0601)  SpO2: 100 % (12/01/23 1922) Vital Signs (24h Range):  Temp:  [97.9 °F (36.6 °C)-99.7 °F (37.6 °C)] 99.6 °F (37.6 °C)  Pulse:  [] 103  Resp:  [14-24] 18  SpO2:  [88 %-100 %] 100 %  BP: (109-146)/(56-87) 109/56  Arterial Line BP: (73-95)/(52-69) 73/57     Weight: 77.5 kg (170 lb 13.7 oz)  Body mass index is 23.17 kg/m².      Intake/Output Summary (Last 24 hours) at 12/1/2023 1938  Last data filed at 12/1/2023 1907  Gross per 24 hour   Intake 3834.11 ml   Output 1455 ml   Net 2379.11 ml          Physical Exam  Vitals and nursing note reviewed.   Cardiovascular:      Rate and Rhythm: Normal rate and regular rhythm.      Comments: Open chest,  bloody  RPM 4800, flow 2.8  Pulmonary:      Comments: intubated  Abdominal:      General: There is no distension.      Palpations: Abdomen is soft.   Genitourinary:     Comments: goodman  Skin:     General: Skin is warm and dry.   Neurological:      Comments: sedated            Vents:  Vent Mode: A/C (12/01/23 1744)  Ventilator Initiated: Yes (12/01/23 1529)  Set Rate: 18 BPM (12/01/23 1744)  Vt Set: 520 mL (12/01/23 1744)  PEEP/CPAP: 5 cmH20 (12/01/23 1744)  Oxygen Concentration (%): 40 (12/01/23 1907)  Peak Airway Pressure: 22 cmH20 (12/01/23 1744)  Plateau Pressure: 0 cmH20 (12/01/23 1744)  Total Ve: 9.55 L/m (12/01/23 1744)  Negative Inspiratory Force (cm H2O): 0 (12/01/23 1744)  F/VT Ratio<105 (RSBI): (!) 33.9  (12/01/23 1744)    Lines/Drains/Airways       Central Venous Catheter Line  Duration              Introducer with Double Lumen 12/01/23 1554 Internal Jugular Right <1 day    Pulmonary Artery Catheter Assessment  12/01/23 1556 Internal Jugular Right <1 day    Trialysis (Dialysis) Catheter 12/01/23 1530 left internal jugular <1 day              Drain  Duration                  Chest Tube Tube - 1 Right Mediastinal -- days         Chest Tube 12/01/23 Tube - 2 Left Mediastinal <1 day         NG/OG Tube 12/01/23 1545 orogastric Center mouth <1 day         Urethral Catheter 12/01/23 0754 Temperature probe;Silicone;Non-latex 14 Fr. <1 day              Airway  Duration                  Airway - Non-Surgical 12/01/23 1534 Endotracheal Tube <1 day              Arterial Line  Duration             Arterial Line 12/01/23 1500 Left Radial <1 day              Line  Duration                  VAD 12/01/23 1015 Left ventricular assist device HeartMate 3 <1 day              Peripheral Intravenous Line  Duration                  Peripheral IV - Single Lumen 11/29/23 1100 20 G Anterior;Left Forearm 2 days                    Significant Labs:    CBC/Anemia Profile:  Recent Labs   Lab 12/01/23  1202 12/01/23  1224 12/01/23  1531 12/01/23  1533 12/01/23  1644 12/01/23  1744 12/01/23  1851   WBC 11.82  --  14.69*  --   --   --  12.09   HGB 6.8*  --  9.6*  --   --   --  7.1*   HCT 21.4*   < > 27.7*   < > 22* 21* 20.4*     --  137*  --   --   --  163   MCV 90  --  85  --   --   --  81*   RDW 13.9  --  15.3*  --   --   --  15.5*    < > = values in this interval not displayed.        Chemistries:  Recent Labs   Lab 12/01/23  0325 12/01/23  1531 12/01/23  1851   * 138 141   K 4.5 5.2* 4.6    111* 110   CO2 25 17* 20*   BUN 52* 41* 44*   CREATININE 2.1* 1.7* 1.9*   CALCIUM 8.8 7.9* 8.0*   ALBUMIN 2.8* 1.9* 2.7*   PROT 6.7 4.4* 4.8*   BILITOT 0.4 1.7* 3.1*   ALKPHOS 74 48* 44*   ALT 26 23 23   AST 31 69* 67*   MG 2.8* 2.8* 2.7*    PHOS  --  4.7* 2.6*       All pertinent labs within the past 24 hours have been reviewed.    Significant Imaging: I have reviewed all pertinent imaging results/findings within the past 24 hours.

## 2023-12-02 NOTE — PLAN OF CARE
Recommendations     1. Continuous TF rec: if renal formula warranted for IVÁN: Novasource renal 40 ml/hr to provide 1920 kcal, 87 g pro, 688 mL water.   - Bolus TF rec: Novasource renal 4 cans/d to provide 1900 kcal, 86 g pro, 680 mL water. Provide additional water 40 oz (1200 mL) daily.      2. Continuous TF rec: if using diabetic formula: Glucerna 1.5 50 ml/hr to provide 1800 kcal, 99 g pro, 911 mL water.   - Bolus TF rec: Glucerna 1.5 5 cans/d to provide 1780 kcal, 98 g pro, 900 mL water. Provide additional water 29 oz (870 mL) daily.      3. Please re-consult if TPN recs warranted.     4. If able to advance diet, recommend DM 2000 kcal diet. Fluid per MD.      5. Consider calcium supplementation if hypocalcemia continues.      Goals: Meet % of EEN/EPN by RD f/u date  Nutrition Goal Status: other (comment) (NPO)  Communication of RD Recs: other (comment)

## 2023-12-02 NOTE — CONSULTS
[facility] - [facility] Intensive [facility]  Adult Nutrition  Consult Note    SUMMARY     Recommendations    1. Continuous TF rec: if renal formula warranted for IVÁN: Novasource renal 40 ml/hr to provide 1920 kcal, 87 g pro, 688 mL water.   - Bolus TF rec: Novasource renal 4 cans/d to provide 1900 kcal, 86 g pro, 680 mL water. Provide additional water 40 oz (1200 mL) daily.     2. Continuous TF rec: if using diabetic formula: Glucerna 1.5 50 ml/hr to provide 1800 kcal, 99 g pro, 911 mL water.   - Bolus TF rec: Glucerna 1.5 5 cans/d to provide 1780 kcal, 98 g pro, 900 mL water. Provide additional water 29 oz (870 mL) daily.     3. Please re-consult if TPN recs warranted.    4. If able to advance diet, recommend DM 2000 kcal diet. Fluid per MD.     5. Consider calcium supplementation if hypocalcemia continues.     Goals: Meet % of EEN/EPN by RD f/u date  Nutrition Goal Status: other (comment) (NPO)  Communication of RD Recs: other (comment)    Assessment and Plan    Nutrition Problem  Increased protein needs      Related to (etiology):   Physiological needs      Signs and Symptoms (as evidenced by):   HF     Interventions (treatment strategy):  Collaboration of nutrition care w/ other providers   LVAD placed 12/1     Nutrition Diagnosis Status:   Continues      Reason for Assessment    Reason For Assessment: RD follow-up  Diagnosis: cardiac disease  Relevant Medical History: CAD, DMT2, CHF, PAF  Interdisciplinary Rounds: did not attend  General Information Comments: RD consulted for LVAD placement 12/1. Unable to speak with patient at this time. Intubated with chest open. NPO status. ONS canceled. RD following since 11/15/23. Cardiac diet education provided on 11/15 per chart. Updated TF/TPN recs if needed. Re-assessed EEN, and EPN. Propofol (per I/O 292.5 mL providing 322 kcal). A1c 7.6% on 10/12/23. Next A1c to be taken on 1/12/23. Glu labs trending high x 2 days. Ca trending low x 2 days.   Nutrition Discharge  Planning: Cardiac diet education provided 11/15- no additional questions for me at this time.    Nutrition Risk Screen    Nutrition Risk Screen: no indicators present    Nutrition/Diet History    Spiritual, Cultural Beliefs, Yazidi Practices, Values that Affect Care: no  Food Allergies: NKFA    Anthropometrics    Temp: 98.7 °F (37.1 °C)  Height Method: Stated  Height: 6' (182.9 cm)  Height (inches): 72 in  Weight Method: Bed Scale  Weight:  (Bed scale will not turn on.)  Weight (lb): 170.86 lb  Ideal Body Weight (IBW), Male: 178 lb  % Ideal Body Weight, Male (lb): 97.19 %  BMI (Calculated): 23.2  BMI Grade: 18.5-24.9 - normal       Lab/Procedures/Meds    Pertinent Labs Reviewed: reviewed  Pertinent Labs Comments: BUN 46 H, Crt 2.2 H, GFR 33.2, Glu 184 H, Ca 8.2 L, P 5 H, ALP 54 L, Tpro 5.6 L, Alb 2.7 L, AST 89 H, RBC 2.69 L, H/H 7.8/22.2 L, Plt Cnt 148 L  Pertinent Medications Reviewed: reviewed  Pertinent Medications Comments: atorvastatin, docusate, famotidine, lactulose, polyethylene glycol, senna, D5W, epinephrine, lasix, insulin, propofol (per I/O 292.5 mL providing 322 kcal)    Estimated/Assessed Needs    Weight Used For Calorie Calculations: 77.5 kg (170 lb 13.7 oz)  Energy Calorie Requirements (kcal): 1932 kcal  Energy Need Method: [facility]  Protein Requirements: 78-93 g pro (1-1.2 g/kg on vent)  Weight Used For Protein Calculations: 77.5 kg (170 lb 13.7 oz)  Fluid Requirements (mL): 1 ml/kcal or per MD     RDA Method (mL): 1932         Nutrition Prescription Ordered    Current Diet Order: NPO    Evaluation of Received Nutrient/Fluid Intake    I/O: -5.1 L since 11/18/23  Energy Calories Required: not meeting needs  Protein Required: not meeting needs  Fluid Required: not meeting needs  Total Fluid Intake (mL/kg): 1 ml/kcal or per MD  Comments: LBM: 11/30  Tolerance: tolerating  % Intake of Estimated Energy Needs: 0 - 25 %  % Meal Intake: NPO    Nutrition Risk    Level of Risk/Frequency of Follow-up:   (1x/wk)       Monitor and Evaluation    Food and Nutrient Intake: energy intake, food and beverage intake, enteral nutrition intake, parenteral nutrition intake  Food and Nutrient Adminstration: diet order, enteral and parenteral nutrition administration  Knowledge/Beliefs/Attitudes: food and nutrition knowledge/skill, beliefs and attitudes  Physical Activity and Function: nutrition-related ADLs and IADLs, factors affecting access to physical activity  Anthropometric Measurements: height/length, weight, weight change, body mass index, growth pattern indices/percentile ranks  Biochemical Data, Medical Tests and Procedures: electrolyte and renal panel, gastrointestinal profile, glucose/endocrine profile, inflammatory profile, lipid profile  Nutrition-Focused Physical Findings: overall appearance, extremities, muscles and bones, head and eyes, skin       Nutrition Follow-Up    RD Follow-up?: Yes

## 2023-12-02 NOTE — PROCEDURES
Interrogation of Ventricular assist device was performed with physician analysis of device parameters and review of device function. I have personally reviewed the interrogation findings and agree with findings as stated.   Procedure: Device Interrogation Including analysis of device parameters  Current Settings: Ventricular Assist Device  Review of device function is stable      12/2/2023    11:00 AM 12/2/2023    10:00 AM 12/2/2023     9:00 AM 12/2/2023     8:00 AM 12/2/2023     7:00 AM 12/2/2023     6:00 AM 12/2/2023     5:00 AM   TXP LVAD INTERROGATIONS   Type HeartMate3  HeartMate3 HeartMate3 HeartMate3 HeartMate3 HeartMate3   Flow 3.7 3.6 3.6 3.6 3.4 3.5 3.5   Speed 4800 4800 4800 4800 4800 4800 4800   PI 4.7 5.1 5.3 4.8 5.3 5.2 5.5   Power ([place]) 3.2 3.1 3.1 3.1 3.1 3.1 3.1   LSL 4400 4400 4400 4400 4400 4400 4400   Pulsatility   Intermittent pulse  Intermittent pulse Intermittent pulse Intermittent pulse      Brayan Ocampo MD

## 2023-12-02 NOTE — SUBJECTIVE & OBJECTIVE
Interval History: Patient POD1 s/p HM3 LVAD implant. Good UOP this + cc/hr since 4 AM on lasix 7.5 mg/hr. CVP down to 7. Epi down to .08, remains on  5, Ketty 20.       Continuous Infusions:   sodium chloride 0.9% 10 mL/hr at 23    DOBUTamine 5 mcg/kg/min (23)    EPINEPHrine 0.08 mcg/kg/min (23)    furosemide (LASIX) 2 mg/mL continuous infusion (non-titrating) 10 mg/hr (23)    insulin regular 1 units/mL infusion orderable (CTS POST-OP) 1.4 Units/hr (23)    nicardipine 5 mg/hr (23)    nitric oxide gas      propofoL 50 mcg/kg/min (23)     Scheduled Meds:   amiodarone  400 mg Oral Daily    atorvastatin  40 mg Oral Daily    ceFEPime (MAXIPIME) IVPB  1 g Intravenous Q8H    dextrose 10%  25 g Intravenous Once    docusate sodium  200 mg Oral QHS    famotidine (PF)  20 mg Intravenous Daily    ferrous gluconate  324 mg Oral Daily with breakfast    lactulose  15 g Oral Once    mupirocin   Nasal BID    pantoprazole  40 mg Intravenous Daily    polyethylene glycol  17 g Oral Daily    senna  8.6 mg Oral Daily     PRN Meds:acetaminophen, albumin human 5%, albuterol sulfate, baclofen, bisacodyL, bisacodyL, dextrose 10%, dextrose 10%, dextrose 10%, [] fentaNYL **FOLLOWED BY** fentaNYL, gabapentin, HYDROmorphone, magnesium hydroxide 400 mg/5 ml, magnesium sulfate IVPB, mupirocin, ondansetron, oxyCODONE, oxyCODONE, potassium chloride, potassium chloride, senna-docusate 8.6-50 mg, sodium chloride 0.9%    Review of patient's allergies indicates:  No Known Allergies  Objective:     Vital Signs (Most Recent):  Temp: 99.1 °F (37.3 °C) (23 0700)  Pulse: 88 (23)  Resp: 18 (23)  BP: (!) 88/0 (23 0315)  SpO2: 100 % (23 0900) Vital Signs (24h Range):  Temp:  [97.9 °F (36.6 °C)-100.1 °F (37.8 °C)] 99.1 °F (37.3 °C)  Pulse:  [] 88  Resp:  [18-24] 18  SpO2:  [88 %-100 %] 100 %  BP: (80-88)/(0) 88/0  Arterial Line  BP: (73-98)/(52-81) 88/66     Patient Vitals for the past 72 hrs (Last 3 readings):   Weight   12/01/23 0344 77.5 kg (170 lb 13.7 oz)   11/30/23 0601 77.5 kg (170 lb 13.7 oz)   11/30/23 0400 78.4 kg (172 lb 14.4 oz)       Body mass index is 23.17 kg/m².      Intake/Output Summary (Last 24 hours) at 12/2/2023 0955  Last data filed at 12/2/2023 0900  Gross per 24 hour   Intake 5097.65 ml   Output 2822 ml   Net 2275.65 ml         Hemodynamic Parameters:  PAP: (25-38)/(19-22) 25/20  PAP (Mean):  [23 mmHg-27 mmHg] 23 mmHg    Telemetry: NSR        Physical Exam  Constitutional:       Comments: Intubated and sedated   HENT:      Head: Normocephalic and atraumatic.   Cardiovascular:      Rate and Rhythm: Normal rate and regular rhythm.      Comments: LVAD hum  Pulmonary:      Effort: Pulmonary effort is normal.      Breath sounds: Normal breath sounds.      Comments: Mechanically ventilated  Chest:      Comments: CT x2  Abdominal:      General: Abdomen is flat. Bowel sounds are normal. There is no distension.      Palpations: Abdomen is soft.   Musculoskeletal:         General: Normal range of motion.      Cervical back: Neck supple.   Skin:     General: Skin is warm and dry.   Neurological:      Comments: sedated            Significant Labs:  CBC:  Recent Labs   Lab 12/01/23 2358 12/01/23  2359 12/02/23  0407 12/02/23  0800 12/02/23  0800   WBC 9.67  --  10.55 11.13  --    RBC 3.03*  --  2.83* 2.43*  --    HGB 8.9*  --  8.1* 7.2*  --    HCT 24.9*   < > 22.7* 20.2* 23*     --  145* 142*  --    MCV 82  --  80* 83  --    MCH 29.4  --  28.6 29.6  --    MCHC 35.7  --  35.7 35.6  --     < > = values in this interval not displayed.       BNP:  Recent Labs   Lab 11/29/23  1834   *       CMP:  Recent Labs   Lab 12/01/23 2358 12/02/23  0407 12/02/23  0800   * 114* 183*   CALCIUM 8.0* 8.2* 8.2*   ALBUMIN 2.7* 2.7* 2.7*   PROT 5.2* 5.3* 5.5*    140 140   K 5.4* 5.1 5.1   CO2 21* 21* 21*    109 108    BUN 46* 47* 46*   CREATININE 2.2* 2.4* 2.3*   ALKPHOS 48* 50* 52*   ALT 28 30 32   AST 85* 88* 89*   BILITOT 1.8* 1.4* 1.3*        Coagulation:   Recent Labs   Lab 12/01/23  2358 12/02/23  0407 12/02/23  0800   INR 1.1 1.1 1.0   APTT 28.5 25.8 26.2       LDH:  Recent Labs   Lab 12/01/23  1851   *     Microbiology:  Microbiology Results (last 7 days)       Procedure Component Value Units Date/Time    Blood culture [[id number]] Collected: 11/28/23 1452    Order Status: Completed Specimen: Blood from Peripheral, Upper Arm, Right Updated: 12/01/23 1612     Blood Culture, Routine No growth to date      No Growth to date      No Growth to date    Blood culture [[id number]] Collected: 11/28/23 1450    Order Status: Completed Specimen: Blood from Peripheral, Forearm, Right Updated: 12/01/23 1612     Blood Culture, Routine No growth to date      No Growth to date      No Growth to date            BMP:   Recent Labs   Lab 12/02/23  0800   *      K 5.1      CO2 21*   BUN 46*   CREATININE 2.3*   CALCIUM 8.2*   MG 2.4       I have reviewed all pertinent labs within the past 24 hours.    Estimated Creatinine Clearance: 37 mL/min (A) (based on SCr of 2.3 mg/dL (H)).    Diagnostic Results:  I have reviewed all pertinent imaging results/findings within the past 24 hours.

## 2023-12-02 NOTE — ASSESSMENT & PLAN NOTE
Pt with known ICM with an EF of 25% on home  presented with decompensated HF.  Not in cardiogenic shock    RHC 11/14: RA 14, PA 70/35, PCWP 32, CO 4.1, CI 2.1.   Repeat RHC 11/20 RA RA  20  PA 60/29 (39)  PCWP 29    CO  4.6 l/min  CI  2.3 l/min/m2  2D echo 11/21 showed EF 15-20%, mild RV dysfunction, mild MR, LVEDD 6.9     Repeat on 11/24 showed EF 10-15%, RV mildly reduced, mild-mod MR, LVEDD 6.42  - Home  gtt at 5  - home GDMT: entresto 24/26mg BID (on hold 2/2 IVÁN), Hydralazine 25 q8h being continued  - home diuretics: Was taking lasix 40 bid  - Bridge to LVAD with IABP       -PT/OT and nutrition   -s/p LVAD today (12/1)  - CTS primary

## 2023-12-02 NOTE — NURSING
Reviewed patient with Dr. Jane at bedside.     Cardene started for HTN (MAP mid-80s) and flows 3.0 to 3.2, after no response to Fentanyl 25mcg. Dr. Jane affirms Dr. Washington being ok with MAP closer to 70.   Discussed oliguria and last POC potassium of 5.1. Plan is to trend and notify Dr. Jane if potassium rises to 5.4 or higher. Lasix remains at 5mg/hr  HR improved since beginning of shift; 90 from low 100s  CVP 11  Chest tube output decreasing. No signs of bulging under the [place].

## 2023-12-02 NOTE — PROGRESS NOTES
12/02/2023  Monet Aguiar    Current provider:  Yuri Washington MD    Device interrogation:      12/2/2023    10:00 AM 12/2/2023     9:00 AM 12/2/2023     8:00 AM 12/2/2023     7:00 AM 12/2/2023     6:00 AM 12/2/2023     5:00 AM 12/2/2023     4:00 AM   TXP LVAD INTERROGATIONS   Type  HeartMate3 HeartMate3 HeartMate3 HeartMate3 HeartMate3 HeartMate3   Flow 3.6 3.6 3.6 3.4 3.5 3.5 3.4   Speed 4800 4800 480 4800 4800 4800 4800   PI 5.1 5.3 4.8 5.3 5.2 5.5 5.7   Power ([place]) 3.1 3.1 3.1 3.1 3.1 3.1 3.2   LSL 4400 4400 4400 4400 4400 4400 4400   Pulsatility  Intermittent pulse  Intermittent pulse Intermittent pulse Intermittent pulse Intermittent pulse          Rounded on [facility] to ensure all mechanical assist device settings (IABP or VAD) were appropriate and all parameters were within limits.  I was able to ensure all back up equipment was present, the staff had no issues, and the Perfusion Department daily rounding was complete.      For implantable VADs: Interrogation of Ventricular assist device was performed with analysis of device parameters and review of device function. I have personally reviewed the interrogation findings and agree with findings as stated.     In emergency, the nursing units have been notified to contact the perfusion department either by:  Calling [id number] from 630am to 4pm Mon thru Fri, utilizing the On-Call Finder functionality of Epic and searching for Perfusion, or by contacting the hospital  from 4pm to 630am and on weekends and asking to speak with the perfusionist on call.    11:19 AM

## 2023-12-02 NOTE — SUBJECTIVE & OBJECTIVE
Interval History/Significant Events: POD 1 from LVAD, open chest. Continued resuscitation overnight, no flow alarms. Lasix gtt started, good UOP.  significantly reduced     Follow-up For: Procedure(s) (LRB):  INSERTION-LEFT VENTRICULAR ASSIST DEVICE (Left)  STERNOTOMY, REDO (N/A)  VALVULOPLASTY, MITRAL VALVE (N/A)  LYSIS, ADHESIONS  REPAIR, AORTIC VALVE (N/A)  CLOSURE, TEMPORARY (N/A)  ANGIOPLASTY-VENOUS ARTERY, RIGHT FEMORAL (Right)  REPAIR, ANEURYSM, ARTERY, FEMORAL (Right)    Post-Operative Day: 1 Day Post-Op    Objective:     Vital Signs (Most Recent):  Temp: 98.7 °F (37.1 °C) (12/02/23 1100)  Pulse: 92 (12/02/23 1200)  Resp: 14 (12/02/23 1200)  BP: (!) 85/0 (12/02/23 1100)  SpO2: 100 % (12/02/23 1200) Vital Signs (24h Range):  Temp:  [97.9 °F (36.6 °C)-100.1 °F (37.8 °C)] 98.7 °F (37.1 °C)  Pulse:  [] 92  Resp:  [13-26] 14  SpO2:  [88 %-100 %] 100 %  BP: (80-88)/(0) 85/0  Arterial Line BP: (73-98)/(52-81) 83/60     Weight:  (Bed scale will not turn on.)  Body mass index is 23.17 kg/m².      Intake/Output Summary (Last 24 hours) at 12/2/2023 1219  Last data filed at 12/2/2023 1200  Gross per 24 hour   Intake 5568.56 ml   Output 2897 ml   Net 2671.56 ml          Physical Exam  Vitals and nursing note reviewed.   Constitutional:       Comments: sedated   Cardiovascular:      Rate and Rhythm: Normal rate and regular rhythm.      Comments: Open chest, small amount of blood pooling at top and bottom of incision   SS, R med darker blood  Doppler-able PT and DP bilaterally. More diminished on the R leg    Pulmonary:      Comments: Intubated, minimal settings   Abdominal:      General: There is no distension.      Palpations: Abdomen is soft.   Genitourinary:     Comments: goodman  Musculoskeletal:      Right lower leg: No edema.      Left lower leg: No edema.   Skin:     General: Skin is warm and dry.   Neurological:      Comments: Sedated, moves all extremities             Vents:  Vent Mode: A/C (12/02/23  1052)  Ventilator Initiated: Yes (12/01/23 1529)  Set Rate: 18 BPM (12/02/23 1052)  Vt Set: 490 mL (12/02/23 1052)  PEEP/CPAP: 5 cmH20 (12/02/23 1052)  Oxygen Concentration (%): 40 (12/02/23 1200)  Peak Airway Pressure: 28 cmH20 (12/02/23 1052)  Plateau Pressure: 18 cmH20 (12/02/23 1052)  Total Ve: 9.62 L/m (12/02/23 1052)  Negative Inspiratory Force (cm H2O): 0 (12/02/23 1052)  F/VT Ratio<105 (RSBI): (!) 38.54 (12/02/23 1052)    Lines/Drains/Airways       Central Venous Catheter Line  Duration              Introducer with Double Lumen 12/01/23 1554 Internal Jugular Right <1 day    Pulmonary Artery Catheter Assessment  12/01/23 1556 Internal Jugular Right <1 day    Trialysis (Dialysis) Catheter 12/01/23 1530 left internal jugular <1 day              Drain  Duration                  Chest Tube Tube - 1 Right Mediastinal -- days         Chest Tube 12/01/23 Tube - 2 Left Mediastinal 1 day         Urethral Catheter 12/01/23 0754 Temperature probe;Silicone;Non-latex 14 Fr. 1 day         NG/OG Tube 12/01/23 1545 orogastric Center mouth <1 day              Airway  Duration                  Airway - Non-Surgical 12/01/23 1534 Endotracheal Tube <1 day              Arterial Line  Duration             Arterial Line 12/01/23 1500 Left Radial <1 day              Line  Duration                  VAD 12/01/23 1015 Left ventricular assist device HeartMate 3 1 day              Peripheral Intravenous Line  Duration                  Peripheral IV - Single Lumen 11/29/23 1100 20 G Left Forearm 3 days         Peripheral IV - Single Lumen 12/01/23 2227 20 G Right Hand <1 day                    Significant Labs:    CBC/Anemia Profile:  Recent Labs   Lab 12/01/23  2358 12/01/23  2359 12/02/23  0407 12/02/23  0800 12/02/23  0800   WBC 9.67  --  10.55 11.13  --    HGB 8.9*  --  8.1* 7.2*  --    HCT 24.9*   < > 22.7* 20.2* 23*     --  145* 142*  --    MCV 82  --  80* 83  --    RDW 15.6*  --  15.8* 15.9*  --     < > = values in this  interval not displayed.        Chemistries:  Recent Labs   Lab 12/01/23  2358 12/02/23  0407 12/02/23  0800    140 140   K 5.4* 5.1 5.1    109 108   CO2 21* 21* 21*   BUN 46* 47* 46*   CREATININE 2.2* 2.4* 2.3*   CALCIUM 8.0* 8.2* 8.2*   ALBUMIN 2.7* 2.7* 2.7*   PROT 5.2* 5.3* 5.5*   BILITOT 1.8* 1.4* 1.3*   ALKPHOS 48* 50* 52*   ALT 28 30 32   AST 85* 88* 89*   MG 2.7* 2.6 2.4   PHOS 3.4 4.2 4.7*       All pertinent labs within the past 24 hours have been reviewed.    Significant Imaging:  I have reviewed all pertinent imaging results/findings within the past 24 hours.

## 2023-12-02 NOTE — PT/OT/SLP PROGRESS
Occupational Therapy      Patient Name:  Radha Abbott   MRN:  [id number]    Patient not seen today secondary to s/p LVAD 12/1. Pt remains intubated with chest open  12/2/2023

## 2023-12-02 NOTE — PROGRESS NOTES
[facility] - Surgical [facility]ensive Care  Critical Care - Surgery  Progress Note    Patient Name: Radha Abbott  MRN: [id number]  Admission Date: 11/9/2023  Hospital Length of Stay: 23 days  Code Status: Full Code  Attending Provider: Yuri Washington MD  Primary Care Provider: Vasu Kong MD   Principal Problem: Acute on chronic combined systolic and diastolic CHF, NYHA class 4    Subjective:     Hospital/ICU Course:  No notes on file    Interval History/Significant Events: POD 1 from LVAD, open chest. Continued resuscitation overnight, no flow alarms. Lasix gtt started, good UOP.  significantly reduced     Follow-up For: Procedure(s) (LRB):  INSERTION-LEFT VENTRICULAR ASSIST DEVICE (Left)  STERNOTOMY, REDO (N/A)  VALVULOPLASTY, MITRAL VALVE (N/A)  LYSIS, ADHESIONS  REPAIR, AORTIC VALVE (N/A)  CLOSURE, TEMPORARY (N/A)  ANGIOPLASTY-VENOUS ARTERY, RIGHT FEMORAL (Right)  REPAIR, ANEURYSM, ARTERY, FEMORAL (Right)    Post-Operative Day: 1 Day Post-Op    Objective:     Vital Signs (Most Recent):  Temp: 98.7 °F (37.1 °C) (12/02/23 1100)  Pulse: 92 (12/02/23 1200)  Resp: 14 (12/02/23 1200)  BP: (!) 85/0 (12/02/23 1100)  SpO2: 100 % (12/02/23 1200) Vital Signs (24h Range):  Temp:  [97.9 °F (36.6 °C)-100.1 °F (37.8 °C)] 98.7 °F (37.1 °C)  Pulse:  [] 92  Resp:  [13-26] 14  SpO2:  [88 %-100 %] 100 %  BP: (80-88)/(0) 85/0  Arterial Line BP: (73-98)/(52-81) 83/60     Weight:  (Bed scale will not turn on.)  Body mass index is 23.17 kg/m².      Intake/Output Summary (Last 24 hours) at 12/2/2023 1219  Last data filed at 12/2/2023 1200  Gross per 24 hour   Intake 5568.56 ml   Output 2897 ml   Net 2671.56 ml          Physical Exam  Vitals and nursing note reviewed.   Constitutional:       Comments: sedated   Cardiovascular:      Rate and Rhythm: Normal rate and regular rhythm.      Comments: Open chest, small amount of blood pooling at top and bottom of incision   SS, R med darker blood  Doppler-able PT and DP bilaterally.  More diminished on the R leg    Pulmonary:      Comments: Intubated, minimal settings   Abdominal:      General: There is no distension.      Palpations: Abdomen is soft.   Genitourinary:     Comments: goodman  Musculoskeletal:      Right lower leg: No edema.      Left lower leg: No edema.   Skin:     General: Skin is warm and dry.   Neurological:      Comments: Sedated, moves all extremities             Vents:  Vent Mode: A/C (12/02/23 1052)  Ventilator Initiated: Yes (12/01/23 1529)  Set Rate: 18 BPM (12/02/23 1052)  Vt Set: 490 mL (12/02/23 1052)  PEEP/CPAP: 5 cmH20 (12/02/23 1052)  Oxygen Concentration (%): 40 (12/02/23 1200)  Peak Airway Pressure: 28 cmH20 (12/02/23 1052)  Plateau Pressure: 18 cmH20 (12/02/23 1052)  Total Ve: 9.62 L/m (12/02/23 1052)  Negative Inspiratory Force (cm H2O): 0 (12/02/23 1052)  F/VT Ratio<105 (RSBI): (!) 38.54 (12/02/23 1052)    Lines/Drains/Airways       Central Venous Catheter Line  Duration              Introducer with Double Lumen 12/01/23 1554 Internal Jugular Right <1 day    Pulmonary Artery Catheter Assessment  12/01/23 1556 Internal Jugular Right <1 day    Trialysis (Dialysis) Catheter 12/01/23 1530 left internal jugular <1 day              Drain  Duration                  Chest Tube Tube - 1 Right Mediastinal -- days         Chest Tube 12/01/23 Tube - 2 Left Mediastinal 1 day         Urethral Catheter 12/01/23 0754 Temperature probe;Silicone;Non-latex 14 Fr. 1 day         NG/OG Tube 12/01/23 1545 orogastric Center mouth <1 day              Airway  Duration                  Airway - Non-Surgical 12/01/23 1534 Endotracheal Tube <1 day              Arterial Line  Duration             Arterial Line 12/01/23 1500 Left Radial <1 day              Line  Duration                  VAD 12/01/23 1015 Left ventricular assist device HeartMate 3 1 day              Peripheral Intravenous Line  Duration                  Peripheral IV - Single Lumen 11/29/23 1100 20 G Left Forearm 3 days          Peripheral IV - Single Lumen 12/01/23 2227 20 G Right Hand <1 day                    Significant Labs:    CBC/Anemia Profile:  Recent Labs   Lab 12/01/23 2358 12/01/23 2359 12/02/23  0407 12/02/23  0800 12/02/23  0800   WBC 9.67  --  10.55 11.13  --    HGB 8.9*  --  8.1* 7.2*  --    HCT 24.9*   < > 22.7* 20.2* 23*     --  145* 142*  --    MCV 82  --  80* 83  --    RDW 15.6*  --  15.8* 15.9*  --     < > = values in this interval not displayed.        Chemistries:  Recent Labs   Lab 12/01/23 2358 12/02/23 0407 12/02/23  0800    140 140   K 5.4* 5.1 5.1    109 108   CO2 21* 21* 21*   BUN 46* 47* 46*   CREATININE 2.2* 2.4* 2.3*   CALCIUM 8.0* 8.2* 8.2*   ALBUMIN 2.7* 2.7* 2.7*   PROT 5.2* 5.3* 5.5*   BILITOT 1.8* 1.4* 1.3*   ALKPHOS 48* 50* 52*   ALT 28 30 32   AST 85* 88* 89*   MG 2.7* 2.6 2.4   PHOS 3.4 4.2 4.7*       All pertinent labs within the past 24 hours have been reviewed.    Significant Imaging:  I have reviewed all pertinent imaging results/findings within the past 24 hours.  Assessment/Plan:     Cardiac/Vascular  * Acute on chronic combined systolic and diastolic CHF, NYHA class 4    Neuro/Psych:   -- Sedation: propofol 50mcg/kg/min  -- Pain:    -- fentanyl PRN, dilaudid 0.5 q1 PRN while intubated, Oxy PRN  -- wake up for neuro exam today              Cardiac:   -- S/P redo sternotomy LVAD with Dr. Washington on 12/01, chest remaining open  -- Plans for chest closure pending clinical progression on Monday   -- MAP Goal: 70-80, closer to 70  -- Cleviprex/Cardene PRN  -- Pressors: epi 0.8   -- levo, vaso, shaji off  -- Anti-HTNs: Will start when appropriate  -- Rhythm: NSR  -- amiodarone 400mg daily  -- LVAD heartmate III: RPM 4800, flow 2.5-2.8  -- Beta blocker: Will start when appropriate  -- Statin: Atorvastatin 40 mg QD  -- No ASA at this time      Pulmonary:   -- Goal SpO2 >92%  -- Intubated, minimal vent settings  -- Ketty weaned to 15. Goal to wean to 10 tonight and to 5 by return to  OR Monday   -- Chest Tubes x 2 (2 Meds): output significantly slowed, 10/hr   - right: 470 (960)   - left: 57 (162)  -- q6 ABGs and lactates      Renal:  -- Trend BUN/Cr   -- Maintain [place], record strict Is/Os  -- lasix gtt 10  -- UOP /hr, net + 3.2L  -- goal to be net neg 1.5-2L/24hr      Recent Labs   Lab 12/01/23  0325 12/01/23  1531 12/01/23  1851   BUN 52* 41* 44*   CREATININE 2.1* 1.7* 1.9*           FEN / GI:   -- Daily CMP, PRN K/Mag/Phos per protocol   -- Replace electrolytes as needed  -- Nutrition: NPO  -- OG tube ok for meds  -- Bowel Regimen: Miralax, docusate      ID:   -- Afebrile  -- WBC stable  -- Abx: cefepime. NO vanc  -- vanc level 7.8    Recent Labs   Lab 12/01/23  1202 12/01/23  1531 12/01/23  1851   WBC 11.82 14.69* 12.09           Heme/Onc:   -- Hgb 8.7 pre-operatively  -- received 7 RBC, 2 FFP, 1 platelet, 10 cry intra-op  -- 12/1: 1 RBC, 4 FFP, 2 plts, 1 cryo  -- q6 CBC and coags  -- q4 fibrinogen - keep fibrinogen >300  -- holding ASA    Recent Labs   Lab 11/30/23  0413 12/01/23  0325 12/01/23  1202 12/01/23  1531 12/01/23  1851   HGB 8.4* 7.9* 6.8* 9.6* 7.1*    280 152 137* 163   APTT 48.8*  48.8* 50.3*  --  35.0* 29.6   INR 1.1  --   --  1.2 1.1           Endocrine:   -- CTS Goal -140  -- HgbA1c: 7.6  -- Endocrinology consulted for insulin management      PPx:   Feeding: NPO  Analgesia/Sedation: fent, dilaudid / Prop  Thromboembolic Prevention: SCDs  HOB >30: Yes  Stress Ulcer: pantoprazole   Glucose Control: Yes, insulin management per Endocrinology  Bowel reg:   Invasive Lines/Drains/Airway:   ETT  OGT  Left radial arterial line  RIJ mac w/ slick + swan   LIJ quad CVC  [place]  Chest Tubes: 2 Mediastinal  LVAD     Dispo/Code Status/Palliative:     - Continue SICU Care    - Full Code  .         Elizabeth Key MD  Critical Care - Surgery  [facility] - [facility]

## 2023-12-03 LAB
ALBUMIN SERPL BCP-MCNC: 2.4 G/DL (ref 3.5–5.2)
ALBUMIN SERPL BCP-MCNC: 2.4 G/DL (ref 3.5–5.2)
ALBUMIN SERPL BCP-MCNC: 2.6 G/DL (ref 3.5–5.2)
ALBUMIN SERPL BCP-MCNC: 2.6 G/DL (ref 3.5–5.2)
ALLENS TEST: ABNORMAL
ALP SERPL-CCNC: 63 U/L (ref 55–135)
ALP SERPL-CCNC: 65 U/L (ref 55–135)
ALP SERPL-CCNC: 68 U/L (ref 55–135)
ALP SERPL-CCNC: 71 U/L (ref 55–135)
ALT SERPL W/O P-5'-P-CCNC: 36 U/L (ref 10–44)
ALT SERPL W/O P-5'-P-CCNC: 38 U/L (ref 10–44)
ALT SERPL W/O P-5'-P-CCNC: 38 U/L (ref 10–44)
ALT SERPL W/O P-5'-P-CCNC: 39 U/L (ref 10–44)
ANION GAP SERPL CALC-SCNC: 11 MMOL/L (ref 8–16)
ANION GAP SERPL CALC-SCNC: 12 MMOL/L (ref 8–16)
ANION GAP SERPL CALC-SCNC: 13 MMOL/L (ref 8–16)
ANION GAP SERPL CALC-SCNC: 15 MMOL/L (ref 8–16)
ANISOCYTOSIS BLD QL SMEAR: SLIGHT
APTT PPP: 26.2 SEC (ref 21–32)
APTT PPP: 26.3 SEC (ref 21–32)
APTT PPP: 26.5 SEC (ref 21–32)
APTT PPP: 26.6 SEC (ref 21–32)
AST SERPL-CCNC: 48 U/L (ref 10–40)
AST SERPL-CCNC: 54 U/L (ref 10–40)
AST SERPL-CCNC: 69 U/L (ref 10–40)
AST SERPL-CCNC: 74 U/L (ref 10–40)
BACTERIA BLD CULT: NORMAL
BACTERIA BLD CULT: NORMAL
BASO STIPL BLD QL SMEAR: ABNORMAL
BASO STIPL BLD QL SMEAR: ABNORMAL
BASOPHILS # BLD AUTO: 0.04 K/UL (ref 0–0.2)
BASOPHILS # BLD AUTO: 0.04 K/UL (ref 0–0.2)
BASOPHILS # BLD AUTO: 0.06 K/UL (ref 0–0.2)
BASOPHILS # BLD AUTO: ABNORMAL K/UL (ref 0–0.2)
BASOPHILS NFR BLD: 0.2 % (ref 0–1.9)
BASOPHILS NFR BLD: 0.3 % (ref 0–1.9)
BASOPHILS NFR BLD: 0.4 % (ref 0–1.9)
BASOPHILS NFR BLD: 1 % (ref 0–1.9)
BILIRUB SERPL-MCNC: 0.6 MG/DL (ref 0.1–1)
BILIRUB SERPL-MCNC: 0.6 MG/DL (ref 0.1–1)
BILIRUB SERPL-MCNC: 0.7 MG/DL (ref 0.1–1)
BILIRUB SERPL-MCNC: 0.8 MG/DL (ref 0.1–1)
BIPAP: 0
BUN SERPL-MCNC: 48 MG/DL (ref 8–23)
BUN SERPL-MCNC: 50 MG/DL (ref 8–23)
BUN SERPL-MCNC: 54 MG/DL (ref 8–23)
BUN SERPL-MCNC: 55 MG/DL (ref 8–23)
BURR CELLS BLD QL SMEAR: ABNORMAL
CALCIUM SERPL-MCNC: 8.2 MG/DL (ref 8.7–10.5)
CALCIUM SERPL-MCNC: 8.5 MG/DL (ref 8.7–10.5)
CALCIUM SERPL-MCNC: 8.6 MG/DL (ref 8.7–10.5)
CALCIUM SERPL-MCNC: 8.6 MG/DL (ref 8.7–10.5)
CHLORIDE SERPL-SCNC: 104 MMOL/L (ref 95–110)
CHLORIDE SERPL-SCNC: 105 MMOL/L (ref 95–110)
CHLORIDE SERPL-SCNC: 106 MMOL/L (ref 95–110)
CHLORIDE SERPL-SCNC: 108 MMOL/L (ref 95–110)
CO2 SERPL-SCNC: 20 MMOL/L (ref 23–29)
CO2 SERPL-SCNC: 20 MMOL/L (ref 23–29)
CO2 SERPL-SCNC: 21 MMOL/L (ref 23–29)
CO2 SERPL-SCNC: 21 MMOL/L (ref 23–29)
CREAT SERPL-MCNC: 2.4 MG/DL (ref 0.5–1.4)
CREAT SERPL-MCNC: 2.5 MG/DL (ref 0.5–1.4)
CREAT SERPL-MCNC: 2.6 MG/DL (ref 0.5–1.4)
CREAT SERPL-MCNC: 2.9 MG/DL (ref 0.5–1.4)
DELSYS: ABNORMAL
DIFFERENTIAL METHOD BLD: ABNORMAL
DOHLE BOD BLD QL SMEAR: PRESENT
EOSINOPHIL # BLD AUTO: 0 K/UL (ref 0–0.5)
EOSINOPHIL # BLD AUTO: ABNORMAL K/UL (ref 0–0.5)
EOSINOPHIL NFR BLD: 0 % (ref 0–8)
EOSINOPHIL NFR BLD: 0.1 % (ref 0–8)
ERYTHROCYTE [DISTWIDTH] IN BLOOD BY AUTOMATED COUNT: 16 % (ref 11.5–14.5)
ERYTHROCYTE [DISTWIDTH] IN BLOOD BY AUTOMATED COUNT: 16.1 % (ref 11.5–14.5)
ERYTHROCYTE [DISTWIDTH] IN BLOOD BY AUTOMATED COUNT: 16.3 % (ref 11.5–14.5)
ERYTHROCYTE [DISTWIDTH] IN BLOOD BY AUTOMATED COUNT: 16.5 % (ref 11.5–14.5)
ERYTHROCYTE [SEDIMENTATION RATE] IN BLOOD BY WESTERGREN METHOD: 16 MM/H
ERYTHROCYTE [SEDIMENTATION RATE] IN BLOOD BY WESTERGREN METHOD: 18 MM/H
ERYTHROCYTE [SEDIMENTATION RATE] IN BLOOD BY WESTERGREN METHOD: 18 MM/H
EST. GFR  (NO RACE VARIABLE): 23.9 ML/MIN/1.73 M^2
EST. GFR  (NO RACE VARIABLE): 27.2 ML/MIN/1.73 M^2
EST. GFR  (NO RACE VARIABLE): 28.5 ML/MIN/1.73 M^2
EST. GFR  (NO RACE VARIABLE): 29.9 ML/MIN/1.73 M^2
FIBRINOGEN PPP-MCNC: 621 MG/DL (ref 182–400)
FIBRINOGEN PPP-MCNC: 633 MG/DL (ref 182–400)
FIBRINOGEN PPP-MCNC: 712 MG/DL (ref 182–400)
FIBRINOGEN PPP-MCNC: 775 MG/DL (ref 182–400)
FIO2: 40
FIO2: 40 %
GIANT PLATELETS BLD QL SMEAR: PRESENT
GLUCOSE SERPL-MCNC: 116 MG/DL (ref 70–110)
GLUCOSE SERPL-MCNC: 181 MG/DL (ref 70–110)
GLUCOSE SERPL-MCNC: 185 MG/DL (ref 70–110)
GLUCOSE SERPL-MCNC: 222 MG/DL (ref 70–110)
GLUCOSE SERPL-MCNC: 223 MG/DL (ref 70–110)
GLUCOSE SERPL-MCNC: 225 MG/DL (ref 70–110)
HCO3 UR-SCNC: 18.8 MMOL/L (ref 24–28)
HCO3 UR-SCNC: 19.1 MMOL/L (ref 24–28)
HCO3 UR-SCNC: 19.7 MMOL/L (ref 24–28)
HCO3 UR-SCNC: 19.8 MMOL/L (ref 24–28)
HCO3 UR-SCNC: 21.2 MMOL/L (ref 24–28)
HCO3 UR-SCNC: 22.1 MMOL/L (ref 24–28)
HCO3 UR-SCNC: 22.7 MMOL/L (ref 24–28)
HCT VFR BLD AUTO: 22 % (ref 40–54)
HCT VFR BLD AUTO: 22.3 % (ref 40–54)
HCT VFR BLD AUTO: 22.7 % (ref 40–54)
HCT VFR BLD AUTO: 22.9 % (ref 40–54)
HCT VFR BLD CALC: 21 %PCV (ref 36–54)
HCT VFR BLD CALC: 21 %PCV (ref 36–54)
HCT VFR BLD CALC: 22 %PCV (ref 36–54)
HCT VFR BLD CALC: 26 %PCV (ref 36–54)
HGB BLD-MCNC: 7.6 G/DL (ref 14–18)
HGB BLD-MCNC: 7.7 G/DL (ref 14–18)
HGB BLD-MCNC: 7.8 G/DL (ref 14–18)
HGB BLD-MCNC: 7.8 G/DL (ref 14–18)
HYPOCHROMIA BLD QL SMEAR: ABNORMAL
IMM GRANULOCYTES # BLD AUTO: 0.08 K/UL (ref 0–0.04)
IMM GRANULOCYTES # BLD AUTO: 0.08 K/UL (ref 0–0.04)
IMM GRANULOCYTES # BLD AUTO: 0.11 K/UL (ref 0–0.04)
IMM GRANULOCYTES # BLD AUTO: ABNORMAL K/UL (ref 0–0.04)
IMM GRANULOCYTES NFR BLD AUTO: 0.5 % (ref 0–0.5)
IMM GRANULOCYTES NFR BLD AUTO: 0.5 % (ref 0–0.5)
IMM GRANULOCYTES NFR BLD AUTO: 0.7 % (ref 0–0.5)
IMM GRANULOCYTES NFR BLD AUTO: ABNORMAL % (ref 0–0.5)
INR PPP: 1 (ref 0.8–1.2)
INR PPP: 1 (ref 0.8–1.2)
INR PPP: 1.1 (ref 0.8–1.2)
INR PPP: 1.1 (ref 0.8–1.2)
LYMPHOCYTES # BLD AUTO: 0.5 K/UL (ref 1–4.8)
LYMPHOCYTES # BLD AUTO: 0.6 K/UL (ref 1–4.8)
LYMPHOCYTES # BLD AUTO: 0.6 K/UL (ref 1–4.8)
LYMPHOCYTES # BLD AUTO: ABNORMAL K/UL (ref 1–4.8)
LYMPHOCYTES NFR BLD: 3.3 % (ref 18–48)
LYMPHOCYTES NFR BLD: 3.7 % (ref 18–48)
LYMPHOCYTES NFR BLD: 3.9 % (ref 18–48)
LYMPHOCYTES NFR BLD: 5 % (ref 18–48)
MAGNESIUM SERPL-MCNC: 2.4 MG/DL (ref 1.6–2.6)
MCH RBC QN AUTO: 28.9 PG (ref 27–31)
MCH RBC QN AUTO: 29.1 PG (ref 27–31)
MCH RBC QN AUTO: 29.4 PG (ref 27–31)
MCH RBC QN AUTO: 29.5 PG (ref 27–31)
MCHC RBC AUTO-ENTMCNC: 33.9 G/DL (ref 32–36)
MCHC RBC AUTO-ENTMCNC: 34.1 G/DL (ref 32–36)
MCHC RBC AUTO-ENTMCNC: 34.1 G/DL (ref 32–36)
MCHC RBC AUTO-ENTMCNC: 35.5 G/DL (ref 32–36)
MCV RBC AUTO: 83 FL (ref 82–98)
MCV RBC AUTO: 85 FL (ref 82–98)
MCV RBC AUTO: 86 FL (ref 82–98)
MCV RBC AUTO: 86 FL (ref 82–98)
METHEMOGLOBIN: 1.6 % (ref 0–3)
MIN VOL: 7.5
MIN VOL: 8.7
MIN VOL: 8.7
MODE: ABNORMAL
MONOCYTES # BLD AUTO: 0.7 K/UL (ref 0.3–1)
MONOCYTES # BLD AUTO: 0.8 K/UL (ref 0.3–1)
MONOCYTES # BLD AUTO: 1 K/UL (ref 0.3–1)
MONOCYTES # BLD AUTO: ABNORMAL K/UL (ref 0.3–1)
MONOCYTES NFR BLD: 4.9 % (ref 4–15)
MONOCYTES NFR BLD: 5.3 % (ref 4–15)
MONOCYTES NFR BLD: 5.9 % (ref 4–15)
MONOCYTES NFR BLD: 6 % (ref 4–15)
NEUTROPHILS # BLD AUTO: 13.6 K/UL (ref 1.8–7.7)
NEUTROPHILS # BLD AUTO: 14.1 K/UL (ref 1.8–7.7)
NEUTROPHILS # BLD AUTO: 14.8 K/UL (ref 1.8–7.7)
NEUTROPHILS NFR BLD: 83 % (ref 38–73)
NEUTROPHILS NFR BLD: 89.8 % (ref 38–73)
NEUTROPHILS NFR BLD: 90.1 % (ref 38–73)
NEUTROPHILS NFR BLD: 90.2 % (ref 38–73)
NEUTS BAND NFR BLD MANUAL: 5 %
NRBC BLD-RTO: 0 /100 WBC
OVALOCYTES BLD QL SMEAR: ABNORMAL
OVALOCYTES BLD QL SMEAR: ABNORMAL
PCO2 BLDA: 28.1 MMHG (ref 35–45)
PCO2 BLDA: 29.9 MMHG (ref 35–45)
PCO2 BLDA: 30.5 MMHG (ref 35–45)
PCO2 BLDA: 31.6 MMHG (ref 35–45)
PCO2 BLDA: 34.3 MMHG (ref 35–45)
PCO2 BLDA: 38 MMHG (ref 35–45)
PCO2 BLDA: 43.6 MMHG (ref 35–45)
PEEP: 5
PH SMN: 7.24 [PH] (ref 7.35–7.45)
PH SMN: 7.32 [PH] (ref 7.35–7.45)
PH SMN: 7.4 [PH] (ref 7.35–7.45)
PH SMN: 7.43 [PH] (ref 7.35–7.45)
PH SMN: 7.43 [PH] (ref 7.35–7.45)
PH SMN: 7.45 [PH] (ref 7.35–7.45)
PH SMN: 7.49 [PH] (ref 7.35–7.45)
PHOSPHATE SERPL-MCNC: 5.4 MG/DL (ref 2.7–4.5)
PHOSPHATE SERPL-MCNC: 6.4 MG/DL (ref 2.7–4.5)
PHOSPHATE SERPL-MCNC: 6.4 MG/DL (ref 2.7–4.5)
PHOSPHATE SERPL-MCNC: 6.5 MG/DL (ref 2.7–4.5)
PIP: 22
PLATELET # BLD AUTO: 143 K/UL (ref 150–450)
PLATELET # BLD AUTO: 157 K/UL (ref 150–450)
PLATELET # BLD AUTO: 168 K/UL (ref 150–450)
PLATELET # BLD AUTO: 171 K/UL (ref 150–450)
PLATELET BLD QL SMEAR: ABNORMAL
PLATELET BLD QL SMEAR: ABNORMAL
PMV BLD AUTO: 10.8 FL (ref 9.2–12.9)
PMV BLD AUTO: 11.3 FL (ref 9.2–12.9)
PMV BLD AUTO: 11.4 FL (ref 9.2–12.9)
PMV BLD AUTO: 11.5 FL (ref 9.2–12.9)
PO2 BLDA: 137 MMHG (ref 80–100)
PO2 BLDA: 140 MMHG (ref 80–100)
PO2 BLDA: 156 MMHG (ref 80–100)
PO2 BLDA: 172 MMHG (ref 80–100)
PO2 BLDA: 236 MMHG (ref 80–100)
PO2 BLDA: 30 MMHG (ref 40–60)
PO2 BLDA: 34 MMHG (ref 40–60)
PO2 BLDA: 512 MMHG (ref 80–100)
POC BE: -2 MMOL/L
POC BE: -4 MMOL/L
POC BE: -6 MMOL/L
POC BE: -6 MMOL/L
POC BE: -9 MMOL/L
POC IONIZED CALCIUM: 1.11 MMOL/L (ref 1.06–1.42)
POC IONIZED CALCIUM: 1.11 MMOL/L (ref 1.06–1.42)
POC IONIZED CALCIUM: 1.18 MMOL/L (ref 1.06–1.42)
POC IONIZED CALCIUM: 1.57 MMOL/L (ref 1.06–1.42)
POC METHB: 1.6 % (ref 0–3)
POC PERFORMED BY: NORMAL
POC SATURATED O2: 100 % (ref 95–100)
POC SATURATED O2: 61 % (ref 95–100)
POC SATURATED O2: 65 % (ref 95–100)
POC SATURATED O2: 99 % (ref 95–100)
POC SVO2: 61 %
POC TCO2: 20 MMOL/L (ref 23–27)
POC TCO2: 20 MMOL/L (ref 23–27)
POC TCO2: 21 MMOL/L (ref 23–27)
POC TCO2: 21 MMOL/L (ref 23–27)
POC TCO2: 22 MMOL/L (ref 23–27)
POC TCO2: 23 MMOL/L (ref 23–27)
POC TCO2: 24 MMOL/L (ref 24–29)
POC TEMPERATURE: 37 C
POCT GLUCOSE: 119 MG/DL (ref 70–110)
POCT GLUCOSE: 124 MG/DL (ref 70–110)
POCT GLUCOSE: 148 MG/DL (ref 70–110)
POCT GLUCOSE: 153 MG/DL (ref 70–110)
POCT GLUCOSE: 165 MG/DL (ref 70–110)
POCT GLUCOSE: 189 MG/DL (ref 70–110)
POCT GLUCOSE: 215 MG/DL (ref 70–110)
POCT GLUCOSE: 222 MG/DL (ref 70–110)
POCT GLUCOSE: 232 MG/DL (ref 70–110)
POCT GLUCOSE: 237 MG/DL (ref 70–110)
POCT GLUCOSE: 94 MG/DL (ref 70–110)
POCT GLUCOSE: 95 MG/DL (ref 70–110)
POIKILOCYTOSIS BLD QL SMEAR: SLIGHT
POLYCHROMASIA BLD QL SMEAR: ABNORMAL
POTASSIUM BLD-SCNC: 3.9 MMOL/L (ref 3.5–5.1)
POTASSIUM BLD-SCNC: 4.4 MMOL/L (ref 3.5–5.1)
POTASSIUM BLD-SCNC: 4.7 MMOL/L (ref 3.5–5.1)
POTASSIUM BLD-SCNC: 4.8 MMOL/L (ref 3.5–5.1)
POTASSIUM SERPL-SCNC: 4 MMOL/L (ref 3.5–5.1)
POTASSIUM SERPL-SCNC: 4.4 MMOL/L (ref 3.5–5.1)
POTASSIUM SERPL-SCNC: 4.5 MMOL/L (ref 3.5–5.1)
POTASSIUM SERPL-SCNC: 4.9 MMOL/L (ref 3.5–5.1)
PROT SERPL-MCNC: 5.2 G/DL (ref 6–8.4)
PROT SERPL-MCNC: 5.8 G/DL (ref 6–8.4)
PROT SERPL-MCNC: 6 G/DL (ref 6–8.4)
PROT SERPL-MCNC: 6.1 G/DL (ref 6–8.4)
PROTHROMBIN TIME: 10.9 SEC (ref 9–12.5)
PROTHROMBIN TIME: 11.1 SEC (ref 9–12.5)
PROTHROMBIN TIME: 11.3 SEC (ref 9–12.5)
PROTHROMBIN TIME: 11.4 SEC (ref 9–12.5)
RBC # BLD AUTO: 2.63 M/UL (ref 4.6–6.2)
RBC # BLD AUTO: 2.64 M/UL (ref 4.6–6.2)
RBC # BLD AUTO: 2.65 M/UL (ref 4.6–6.2)
RBC # BLD AUTO: 2.65 M/UL (ref 4.6–6.2)
SAMPLE: ABNORMAL
SCHISTOCYTES BLD QL SMEAR: ABNORMAL
SITE: ABNORMAL
SMUDGE CELLS BLD QL SMEAR: PRESENT
SODIUM BLD-SCNC: 136 MMOL/L (ref 136–145)
SODIUM BLD-SCNC: 138 MMOL/L (ref 136–145)
SODIUM BLD-SCNC: 138 MMOL/L (ref 136–145)
SODIUM BLD-SCNC: 139 MMOL/L (ref 136–145)
SODIUM SERPL-SCNC: 137 MMOL/L (ref 136–145)
SODIUM SERPL-SCNC: 139 MMOL/L (ref 136–145)
SODIUM SERPL-SCNC: 140 MMOL/L (ref 136–145)
SODIUM SERPL-SCNC: 140 MMOL/L (ref 136–145)
SP02: 100
SPECIMEN SOURCE: NORMAL
SPHEROCYTES BLD QL SMEAR: ABNORMAL
TOXIC GRANULES BLD QL SMEAR: PRESENT
VANCOMYCIN SERPL-MCNC: 4.7 UG/ML
VT: 490
WBC # BLD AUTO: 14.82 K/UL (ref 3.9–12.7)
WBC # BLD AUTO: 15.05 K/UL (ref 3.9–12.7)
WBC # BLD AUTO: 15.66 K/UL (ref 3.9–12.7)
WBC # BLD AUTO: 16.5 K/UL (ref 3.9–12.7)

## 2023-12-03 PROCEDURE — 27000248 HC VAD-ADDITIONAL DAY

## 2023-12-03 PROCEDURE — 85730 THROMBOPLASTIN TIME PARTIAL: CPT | Mod: 91 | Performed by: THORACIC SURGERY (CARDIOTHORACIC VASCULAR SURGERY)

## 2023-12-03 PROCEDURE — 99499 UNLISTED E&M SERVICE: CPT | Mod: ,,, | Performed by: ANESTHESIOLOGY

## 2023-12-03 PROCEDURE — 94761 N-INVAS EAR/PLS OXIMETRY MLT: CPT

## 2023-12-03 PROCEDURE — 94003 VENT MGMT INPAT SUBQ DAY: CPT

## 2023-12-03 PROCEDURE — 85384 FIBRINOGEN ACTIVITY: CPT | Mod: 91 | Performed by: THORACIC SURGERY (CARDIOTHORACIC VASCULAR SURGERY)

## 2023-12-03 PROCEDURE — 63600175 PHARM REV CODE 636 W HCPCS

## 2023-12-03 PROCEDURE — 25000003 PHARM REV CODE 250: Performed by: THORACIC SURGERY (CARDIOTHORACIC VASCULAR SURGERY)

## 2023-12-03 PROCEDURE — 25000003 PHARM REV CODE 250: Performed by: INTERNAL MEDICINE

## 2023-12-03 PROCEDURE — 25000003 PHARM REV CODE 250

## 2023-12-03 PROCEDURE — 80053 COMPREHEN METABOLIC PANEL: CPT | Mod: 91 | Performed by: THORACIC SURGERY (CARDIOTHORACIC VASCULAR SURGERY)

## 2023-12-03 PROCEDURE — 25000003 PHARM REV CODE 250: Performed by: NURSE PRACTITIONER

## 2023-12-03 PROCEDURE — 63600175 PHARM REV CODE 636 W HCPCS: Performed by: THORACIC SURGERY (CARDIOTHORACIC VASCULAR SURGERY)

## 2023-12-03 PROCEDURE — 80202 ASSAY OF VANCOMYCIN: CPT | Performed by: STUDENT IN AN ORGANIZED HEALTH CARE EDUCATION/TRAINING PROGRAM

## 2023-12-03 PROCEDURE — 25000003 PHARM REV CODE 250: Performed by: STUDENT IN AN ORGANIZED HEALTH CARE EDUCATION/TRAINING PROGRAM

## 2023-12-03 PROCEDURE — C9113 INJ PANTOPRAZOLE SODIUM, VIA: HCPCS

## 2023-12-03 PROCEDURE — 99291 CRITICAL CARE FIRST HOUR: CPT | Mod: FS,,, | Performed by: PHYSICIAN ASSISTANT

## 2023-12-03 PROCEDURE — 83050 HGB METHEMOGLOBIN QUAN: CPT

## 2023-12-03 PROCEDURE — 85610 PROTHROMBIN TIME: CPT | Performed by: THORACIC SURGERY (CARDIOTHORACIC VASCULAR SURGERY)

## 2023-12-03 PROCEDURE — 85025 COMPLETE CBC W/AUTO DIFF WBC: CPT | Mod: 91 | Performed by: THORACIC SURGERY (CARDIOTHORACIC VASCULAR SURGERY)

## 2023-12-03 PROCEDURE — 82803 BLOOD GASES ANY COMBINATION: CPT

## 2023-12-03 PROCEDURE — 99900035 HC TECH TIME PER 15 MIN (STAT)

## 2023-12-03 PROCEDURE — 63600175 PHARM REV CODE 636 W HCPCS: Performed by: STUDENT IN AN ORGANIZED HEALTH CARE EDUCATION/TRAINING PROGRAM

## 2023-12-03 PROCEDURE — 20000000 HC ICU ROOM

## 2023-12-03 PROCEDURE — 99232 SBSQ HOSP IP/OBS MODERATE 35: CPT | Mod: ,,, | Performed by: NURSE PRACTITIONER

## 2023-12-03 PROCEDURE — 99900026 HC AIRWAY MAINTENANCE (STAT)

## 2023-12-03 PROCEDURE — 37799 UNLISTED PX VASCULAR SURGERY: CPT

## 2023-12-03 PROCEDURE — 63600367 HC NITRIC OXIDE PER HOUR

## 2023-12-03 PROCEDURE — 83735 ASSAY OF MAGNESIUM: CPT | Mod: 91 | Performed by: THORACIC SURGERY (CARDIOTHORACIC VASCULAR SURGERY)

## 2023-12-03 PROCEDURE — 84100 ASSAY OF PHOSPHORUS: CPT | Mod: 91 | Performed by: THORACIC SURGERY (CARDIOTHORACIC VASCULAR SURGERY)

## 2023-12-03 PROCEDURE — 99292 CRITICAL CARE ADDL 30 MIN: CPT | Mod: FS,,, | Performed by: PHYSICIAN ASSISTANT

## 2023-12-03 PROCEDURE — 63600175 PHARM REV CODE 636 W HCPCS: Performed by: NURSE PRACTITIONER

## 2023-12-03 PROCEDURE — 27100171 HC OXYGEN HIGH FLOW UP TO 24 HOURS

## 2023-12-03 RX ORDER — INSULIN ASPART 100 [IU]/ML
0-5 INJECTION, SOLUTION INTRAVENOUS; SUBCUTANEOUS
Status: DISCONTINUED | OUTPATIENT
Start: 2023-12-03 | End: 2023-12-06

## 2023-12-03 RX ORDER — GLUCAGON 1 MG
1 KIT INJECTION
Status: DISCONTINUED | OUTPATIENT
Start: 2023-12-03 | End: 2023-12-06

## 2023-12-03 RX ORDER — IBUPROFEN 200 MG
24 TABLET ORAL
Status: DISCONTINUED | OUTPATIENT
Start: 2023-12-03 | End: 2023-12-06

## 2023-12-03 RX ORDER — HYDROMORPHONE HYDROCHLORIDE 1 MG/ML
0.5 INJECTION, SOLUTION INTRAMUSCULAR; INTRAVENOUS; SUBCUTANEOUS
Status: DISCONTINUED | OUTPATIENT
Start: 2023-12-03 | End: 2023-12-06

## 2023-12-03 RX ORDER — IBUPROFEN 200 MG
16 TABLET ORAL
Status: DISCONTINUED | OUTPATIENT
Start: 2023-12-03 | End: 2023-12-06

## 2023-12-03 RX ADMIN — PROPOFOL 50 MCG/KG/MIN: 10 INJECTION, EMULSION INTRAVENOUS at 07:12

## 2023-12-03 RX ADMIN — PROPOFOL 50 MCG/KG/MIN: 10 INJECTION, EMULSION INTRAVENOUS at 01:12

## 2023-12-03 RX ADMIN — OXYCODONE HYDROCHLORIDE 10 MG: 10 TABLET ORAL at 12:12

## 2023-12-03 RX ADMIN — NICARDIPINE HYDROCHLORIDE 5 MG/HR: 0.2 INJECTION, SOLUTION INTRAVENOUS at 11:12

## 2023-12-03 RX ADMIN — PROPOFOL 50 MCG/KG/MIN: 10 INJECTION, EMULSION INTRAVENOUS at 03:12

## 2023-12-03 RX ADMIN — INSULIN ASPART 2 UNITS: 100 INJECTION, SOLUTION INTRAVENOUS; SUBCUTANEOUS at 11:12

## 2023-12-03 RX ADMIN — HYDROMORPHONE HYDROCHLORIDE 0.5 MG: 0.5 INJECTION, SOLUTION INTRAMUSCULAR; INTRAVENOUS; SUBCUTANEOUS at 02:12

## 2023-12-03 RX ADMIN — HYDROMORPHONE HYDROCHLORIDE 0.5 MG: 0.5 INJECTION, SOLUTION INTRAMUSCULAR; INTRAVENOUS; SUBCUTANEOUS at 04:12

## 2023-12-03 RX ADMIN — HYDROMORPHONE HYDROCHLORIDE 0.5 MG: 0.5 INJECTION, SOLUTION INTRAMUSCULAR; INTRAVENOUS; SUBCUTANEOUS at 01:12

## 2023-12-03 RX ADMIN — PROPOFOL 50 MCG/KG/MIN: 10 INJECTION, EMULSION INTRAVENOUS at 04:12

## 2023-12-03 RX ADMIN — PROPOFOL 50 MCG/KG/MIN: 10 INJECTION, EMULSION INTRAVENOUS at 11:12

## 2023-12-03 RX ADMIN — INSULIN HUMAN 0.8 UNITS/HR: 1 INJECTION, SOLUTION INTRAVENOUS at 05:12

## 2023-12-03 RX ADMIN — SODIUM CHLORIDE: 9 INJECTION, SOLUTION INTRAVENOUS at 09:12

## 2023-12-03 RX ADMIN — EPINEPHRINE 0.06 MCG/KG/MIN: 1 INJECTION INTRAMUSCULAR; INTRAVENOUS; SUBCUTANEOUS at 06:12

## 2023-12-03 RX ADMIN — FERROUS GLUCONATE 324 MG: 324 TABLET ORAL at 08:12

## 2023-12-03 RX ADMIN — HYDROMORPHONE HYDROCHLORIDE 0.5 MG: 0.5 INJECTION, SOLUTION INTRAMUSCULAR; INTRAVENOUS; SUBCUTANEOUS at 07:12

## 2023-12-03 RX ADMIN — HYDROMORPHONE HYDROCHLORIDE 0.5 MG: 0.5 INJECTION, SOLUTION INTRAMUSCULAR; INTRAVENOUS; SUBCUTANEOUS at 08:12

## 2023-12-03 RX ADMIN — INSULIN HUMAN 1.4 UNITS/HR: 1 INJECTION, SOLUTION INTRAVENOUS at 08:12

## 2023-12-03 RX ADMIN — OXYCODONE HYDROCHLORIDE 10 MG: 10 TABLET ORAL at 10:12

## 2023-12-03 RX ADMIN — MUPIROCIN: 20 OINTMENT TOPICAL at 09:12

## 2023-12-03 RX ADMIN — HYDROMORPHONE HYDROCHLORIDE 0.5 MG: 0.5 INJECTION, SOLUTION INTRAMUSCULAR; INTRAVENOUS; SUBCUTANEOUS at 11:12

## 2023-12-03 RX ADMIN — OXYCODONE HYDROCHLORIDE 10 MG: 10 TABLET ORAL at 05:12

## 2023-12-03 RX ADMIN — OXYCODONE HYDROCHLORIDE 10 MG: 10 TABLET ORAL at 08:12

## 2023-12-03 RX ADMIN — HYDROMORPHONE HYDROCHLORIDE 0.5 MG: 0.5 INJECTION, SOLUTION INTRAMUSCULAR; INTRAVENOUS; SUBCUTANEOUS at 06:12

## 2023-12-03 RX ADMIN — FAMOTIDINE 20 MG: 10 INJECTION, SOLUTION INTRAVENOUS at 08:12

## 2023-12-03 RX ADMIN — CEFEPIME 1 G: 1 INJECTION, POWDER, FOR SOLUTION INTRAMUSCULAR; INTRAVENOUS at 03:12

## 2023-12-03 RX ADMIN — DOBUTAMINE IN DEXTROSE 5 MCG/KG/MIN: 400 INJECTION, SOLUTION INTRAVENOUS at 05:12

## 2023-12-03 RX ADMIN — INSULIN ASPART 1 UNITS: 100 INJECTION, SOLUTION INTRAVENOUS; SUBCUTANEOUS at 03:12

## 2023-12-03 RX ADMIN — DOCUSATE SODIUM 200 MG: 100 CAPSULE, LIQUID FILLED ORAL at 09:12

## 2023-12-03 RX ADMIN — NICARDIPINE HYDROCHLORIDE 4 MG/HR: 0.2 INJECTION, SOLUTION INTRAVENOUS at 01:12

## 2023-12-03 RX ADMIN — MUPIROCIN: 20 OINTMENT TOPICAL at 08:12

## 2023-12-03 RX ADMIN — POLYETHYLENE GLYCOL 3350 17 G: 17 POWDER, FOR SOLUTION ORAL at 08:12

## 2023-12-03 RX ADMIN — SODIUM CHLORIDE 20 MG/HR: 9 INJECTION, SOLUTION INTRAVENOUS at 04:12

## 2023-12-03 RX ADMIN — AMIODARONE HYDROCHLORIDE 400 MG: 200 TABLET ORAL at 08:12

## 2023-12-03 RX ADMIN — SODIUM CHLORIDE 15 MG/HR: 9 INJECTION, SOLUTION INTRAVENOUS at 05:12

## 2023-12-03 RX ADMIN — PANTOPRAZOLE SODIUM 40 MG: 40 INJECTION, POWDER, FOR SOLUTION INTRAVENOUS at 08:12

## 2023-12-03 RX ADMIN — ATORVASTATIN CALCIUM 40 MG: 40 TABLET, FILM COATED ORAL at 08:12

## 2023-12-03 RX ADMIN — INSULIN ASPART 2 UNITS: 100 INJECTION, SOLUTION INTRAVENOUS; SUBCUTANEOUS at 08:12

## 2023-12-03 RX ADMIN — CHLOROTHIAZIDE SODIUM 500 MG: 500 INJECTION, POWDER, LYOPHILIZED, FOR SOLUTION INTRAVENOUS at 06:12

## 2023-12-03 RX ADMIN — PROPOFOL 50 MCG/KG/MIN: 10 INJECTION, EMULSION INTRAVENOUS at 08:12

## 2023-12-03 RX ADMIN — OXYCODONE HYDROCHLORIDE 10 MG: 10 TABLET ORAL at 03:12

## 2023-12-03 RX ADMIN — EPINEPHRINE 0.08 MCG/KG/MIN: 1 INJECTION INTRAMUSCULAR; INTRAVENOUS; SUBCUTANEOUS at 05:12

## 2023-12-03 NOTE — ASSESSMENT & PLAN NOTE
Endocrinology consulted for BG management.   BG goal 140-180        - IIP  - Requires intensive BG monitoring.   - BG checks q1hr  - Hypoglycemia protocol in place    - Notify endocrine if patient becomes hypokalemic (K < 3.3) and/or is not responding to replacement.   - Notify endocrine if patient requiring > 20 units/hr.      ** Please notify Endocrine for any change and/or advance in diet**  ** Please call Endocrine for any BG related issues **    Discharge Planning:   TBD. Please notify endocrinology prior to discharge.

## 2023-12-03 NOTE — SUBJECTIVE & OBJECTIVE
"Interval HPI:   Overnight events: No acute events overnight. Patient in room 24936/60168 A. Blood glucose stable. BG at and above goal on current insulin regimen (IIP). Steroid use- None. 2 Days Post-Op  Renal function- Abnormal - Creatinine 2.5   Vasopressors-  Epinephrine       Endocrine will continue to follow and manage insulin orders inpatient.         Diet NPO Except for: Medication     Eating:   NPO  Nausea: No  Hypoglycemia and intervention: No  Fever: No  TPN and/or TF: No      BP (!) 88/0 (BP Location: Right arm, Patient Position: Lying)   Pulse 90   Temp 99.2 °F (37.3 °C) (Oral)   Resp 16   Ht 6' (1.829 m)   Wt 77.5 kg (170 lb 13.7 oz)   SpO2 100%   BMI 23.17 kg/m²     Labs Reviewed and Include    Recent Labs   Lab 12/03/23  0359   *   CALCIUM 8.2*   ALBUMIN 2.6*   PROT 5.2*      K 4.9   CO2 20*      BUN 50*   CREATININE 2.5*   ALKPHOS 68   ALT 38   AST 69*   BILITOT 0.8     Lab Results   Component Value Date    WBC 16.50 (H) 12/03/2023    HGB 7.8 (L) 12/03/2023    HCT 22.9 (L) 12/03/2023    MCV 86 12/03/2023     12/03/2023     No results for input(s): "TSH", "FREET4" in the last 168 hours.  Lab Results   Component Value Date    HGBA1C 7.6 (H) 10/12/2023       Nutritional status:   Body mass index is 23.17 kg/m².  Lab Results   Component Value Date    ALBUMIN 2.6 (L) 12/03/2023    ALBUMIN 2.6 (L) 12/02/2023    ALBUMIN 2.6 (L) 12/02/2023     Lab Results   Component Value Date    PREALBUMIN 19 (L) 12/01/2023    PREALBUMIN 27 11/15/2023       Estimated Creatinine Clearance: 34 mL/min (A) (based on SCr of 2.5 mg/dL (H)).    Accu-Checks  Recent Labs     12/02/23  2300 12/02/23  2356 12/03/23  0056 12/03/23  0115 12/03/23  0158 12/03/23  0259 12/03/23  0357 12/03/23  0501 12/03/23  0607 12/03/23  0756   POCTGLUCOSE 132* 119* 95 94 124* 153* 189* 237* 222* 232*       Current Medications and/or Treatments Impacting Glycemic Control  Immunotherapy:    Immunosuppressants       None "          Steroids:   Hormones (From admission, onward)      None          Pressors:    Autonomic Drugs (From admission, onward)      Start     Stop Route Frequency Ordered    12/01/23 1625  EPINEPHrine 5 mg in dextrose 5% 250 mL infusion (premix)        Question Answer Comment   Begin at (in mcg/kg/min): 0.1    Titrate by: (in mcg/kg/min) 0.01    Titrate interval: (in minutes) 5    Titrate to maintain: (SBP or MAP or Cardiac Index) MAP    Greater than: (in mmHg) 65    Maximum dose: (in mcg/kg/min) 2        -- IV Continuous 12/01/23 1625          Hyperglycemia/Diabetes Medications:   Antihyperglycemics (From admission, onward)      Start     Stop Route Frequency Ordered    12/03/23 0900  insulin regular in 0.9 % NaCl 100 unit/100 mL (1 unit/mL) infusion        Question:  Enter initial dose (Units/hr):  Answer:  1.4    -- IV Continuous 12/03/23 0752    12/03/23 0852  insulin aspart U-100 pen 0-5 Units         -- SubQ As needed (PRN) 12/03/23 075

## 2023-12-03 NOTE — PROGRESS NOTES
[facility] - [facility] Intensive Care  Endocrinology  Progress Note    Admit Date: 11/9/2023     Reason for Consult: Management of T2DM, Hyperglycemia     Surgical Procedure and Date: n/a    Diabetes diagnosis year: 1998    Home Diabetes Medications:  Metformin (off since October)   Lab Results   Component Value Date    HGBA1C 7.6 (H) 10/12/2023       How often checking glucose at home?  Once daily in the AM    BG readings on regimen: 150-160s  Hypoglycemia on the regimen?  No  Missed doses on regimen?  n/a    Diabetes Complications include:     Hyperglycemia    Complicating diabetes co morbidities:   CAD s/p CABG, HTN, HLD      HPI:   Patient is a 63 y.o. male with a diagnosis of CAD s/p 3v CABG (unclear anatomy, 2009), ICM with a recent EF of 15-20% s/p ICD (medtronik 2009), DM2 (a1c 7.7), HTN, HLD, Vfib on amio who presents to the ED with CC of SOB. In the ED he was AF with stable VS on RA.  CBC showing chronic anemia.  CMP notable for acute on chronic CKD with baseline around 2.1 and Cr now 2.6.  BNP elevated.  Lactate neg.  CXR showing pulmonary edema. He was subsequently admitted to the CCU for diuresis.  Endocrinology consulted for management of T2DM.          Interval HPI:   Overnight events: No acute events overnight. Patient in room 51215/56804 A. Blood glucose stable. BG at goal on current insulin regimen (IIP). Steroid use- None. 2 Days Post-Op  Renal function- Abnormal    Vasopressors-  Epinephrine       Endocrine will continue to follow and manage insulin orders inpatient.         Diet NPO Except for: Medication     Eating:   NPO  Nausea: No  Hypoglycemia and intervention: No  Fever: No  TPN and/or TF: No      BP (!) 88/0 (BP Location: Right arm, Patient Position: Lying)   Pulse 90   Temp 99.2 °F (37.3 °C) (Oral)   Resp 16   Ht 6' (1.829 m)   Wt 77.5 kg (170 lb 13.7 oz)   SpO2 100%   BMI 23.17 kg/m²     Labs Reviewed and Include    Recent Labs   Lab 12/03/23  5735   *   CALCIUM 8.2*  "  ALBUMIN 2.6*   PROT 5.2*      K 4.9   CO2 20*      BUN 50*   CREATININE 2.5*   ALKPHOS 68   ALT 38   AST 69*   BILITOT 0.8     Lab Results   Component Value Date    WBC 16.50 (H) 12/03/2023    HGB 7.8 (L) 12/03/2023    HCT 22.9 (L) 12/03/2023    MCV 86 12/03/2023     12/03/2023     No results for input(s): "TSH", "FREET4" in the last 168 hours.  Lab Results   Component Value Date    HGBA1C 7.6 (H) 10/12/2023       Nutritional status:   Body mass index is 23.17 kg/m².  Lab Results   Component Value Date    ALBUMIN 2.6 (L) 12/03/2023    ALBUMIN 2.6 (L) 12/02/2023    ALBUMIN 2.6 (L) 12/02/2023     Lab Results   Component Value Date    PREALBUMIN 19 (L) 12/01/2023    PREALBUMIN 27 11/15/2023       Estimated Creatinine Clearance: 34 mL/min (A) (based on SCr of 2.5 mg/dL (H)).    Accu-Checks  Recent Labs     12/02/23  2300 12/02/23  2356 12/03/23  0056 12/03/23  0115 12/03/23  0158 12/03/23  0259 12/03/23  0357 12/03/23  0501 12/03/23  0607 12/03/23  0756   POCTGLUCOSE 132* 119* 95 94 124* 153* 189* 237* 222* 232*       Current Medications and/or Treatments Impacting Glycemic Control  Immunotherapy:    Immunosuppressants       None          Steroids:   Hormones (From admission, onward)      None          Pressors:    Autonomic Drugs (From admission, onward)      Start     Stop Route Frequency Ordered    12/01/23 1625  EPINEPHrine 5 mg in dextrose 5% 250 mL infusion (premix)        Question Answer Comment   Begin at (in mcg/kg/min): 0.1    Titrate by: (in mcg/kg/min) 0.01    Titrate interval: (in minutes) 5    Titrate to maintain: (SBP or MAP or Cardiac Index) MAP    Greater than: (in mmHg) 65    Maximum dose: (in mcg/kg/min) 2        -- IV Continuous 12/01/23 1625          Hyperglycemia/Diabetes Medications:   Antihyperglycemics (From admission, onward)      Start     Stop Route Frequency Ordered    12/03/23 0900  insulin regular in 0.9 % NaCl 100 unit/100 mL (1 unit/mL) infusion        Question:  " Enter initial dose (Units/hr):  Answer:  1.4    -- IV Continuous 12/03/23 0752    12/03/23 0852  insulin aspart U-100 pen 0-5 Units         -- SubQ As needed (PRN) 12/03/23 0752            ASSESSMENT and PLAN    Cardiac/Vascular  * Acute on chronic combined systolic and diastolic CHF, NYHA class 4  Managed per primary team  Optimize BG control    PAF (paroxysmal atrial fibrillation)  May increase insulin resistance.         Renal/  IVÁN (acute kidney injury)  Lab Results   Component Value Date    CREATININE 2.5 (H) 12/03/2023     Avoid insulin stacking  Titrate insulin slowly       Endocrine  Type 2 diabetes mellitus without complication, without long-term current use of insulin  Endocrinology consulted for BG management.   BG goal 140-180        - IIP  - Requires intensive BG monitoring.   - BG checks q1hr  - Hypoglycemia protocol in place    - Notify endocrine if patient becomes hypokalemic (K < 3.3) and/or is not responding to replacement.   - Notify endocrine if patient requiring > 20 units/hr.      ** Please notify Endocrine for any change and/or advance in diet**  ** Please call Endocrine for any BG related issues **    Discharge Planning:   TBD. Please notify endocrinology prior to discharge.                 Erasmo Parrish, DNP, FNP  Endocrinology  [facility]

## 2023-12-03 NOTE — NURSING
Propofol stopped for neuro exam. Prior to stopping, patient opens eyes to voice and moves all extremities slightly. Dr. Key at bedside. Patient opens eyes spontaneously and moves all extremities, moving head side to side and pulling against restraint strongly. Propofol restarted when patient began to turn torso and bend at the thorax 2/2 chest remaining open. .

## 2023-12-03 NOTE — ASSESSMENT & PLAN NOTE
Pt with known ICM with an EF of 25% on home  presented with decompensated HF.  Not in cardiogenic shock    RHC 11/14: RA 14, PA 70/35, PCWP 32, CO 4.1, CI 2.1.   Repeat RHC 11/20 RA RA  20  PA 60/29 (39)  PCWP 29    CO  4.6 l/min  CI  2.3 l/min/m2  2D echo 11/21 showed EF 15-20%, mild RV dysfunction, mild MR, LVEDD 6.9     Repeat on 11/24 showed EF 10-15%, RV mildly reduced, mild-mod MR, LVEDD 6.42  - Home  gtt at 5  - home GDMT: entresto 24/26mg BID (on hold 2/2 IVÁN), Hydralazine 25 q8h being continued  - home diuretics: Was taking lasix 40 bid  - Bridged to LVAD with IABP       -PT/OT and nutrition   -s/p LVAD (12/1)  - CTS primary

## 2023-12-03 NOTE — ASSESSMENT & PLAN NOTE
Endocrinology consulted for BG management.   BG goal 140-180     - D/C IIP  - Transition drip at 1.4 units/hr with step-down parameters.   - Novolog (aspart) insulin prn for BG excursions LDC SSI (150/50).  - BG checks q4hr  - Hypoglycemia protocol in place    ** Please notify Endocrine for any change and/or advance in diet**  ** Please call Endocrine for any BG related issues **    Discharge Planning:   TBD. Please notify endocrinology prior to discharge.

## 2023-12-03 NOTE — PROGRESS NOTES
Pharmacist Renal Dose Adjustment Note    Radha Abbott is a 63 y.o. male being treated with the medication Cefepime    Patient Data:    Vital Signs (Most Recent):  Temp: 99.2 °F (37.3 °C) (12/03/23 0700)  Pulse: 90 (12/03/23 0930)  Resp: 16 (12/03/23 0930)  BP: (!) 88/0 (12/03/23 0700)  SpO2: 100 % (12/03/23 0930) Vital Signs (72h Range):  Temp:  [97.9 °F (36.6 °C)-100.1 °F (37.8 °C)]   Pulse:  []   Resp:  [6-26]   BP: ()/(0-87)   SpO2:  [88 %-100 %]   Arterial Line BP: (73-98)/(52-81)      Recent Labs   Lab 12/02/23  1938 12/02/23 2358 12/03/23  0359   CREATININE 2.3* 2.4* 2.5*     Serum creatinine: 2.5 mg/dL (H) 12/03/23 0359  Estimated creatinine clearance: 34 mL/min (A)    Medication: Cefepime 2g every 8 hours will be changed to medication: Cefepime 2 g every 12 hours.     Pharmacist's Name: Mike Raines  Pharmacist's Extension: [phone]

## 2023-12-03 NOTE — NURSING
SICU PLAN OF CARE NOTE    Dx: Acute on chronic combined systolic and diastolic CHF, NYHA class 4    Goals of Care: Possible chest closure Monday.     Vital Signs (last 12 hours):   Temp:  [99 °F (37.2 °C)-99.7 °F (37.6 °C)]   Pulse:  [88-99]   Resp:  [16-19]   BP: (82-84)/(0)   SpO2:  [98 %-100 %]   Arterial Line BP: (79-90)/(53-71)      Neuro: Patient moves all extremities and pulls against restraints with Propofol off; Dr. Key at bedside. Unable to hold Propofol long enough for patient to follow commands 2/2 attempting to sit up with open chest. Through the shift, with Propofol at 50, patient intermittently opens eyes spontaneously and pulls at restraints. Dilaudid 0.5mg given, which would calm patient for ~ 2 hours.      Cardiac: VAD interference makes interpretation difficult. Rate regular in the 90s    Respiratory: Ventilator 40% & 5 PEEP with NO initially at 10, which was later weaned by Dr. Key to 7.5ppm at approx 0530hrs     Gtts: Epinephrine , Dobutamine, Lasix, Propofol, Insulin, and Cardene    Urine Output: Currently negative 1L for the 24hr. UOP improved greatly s/p Diuril 500mg    Drains: Mediastinal drainage continues to taper down; remains sanguinous.     Diet: NPO    VAD Flow 3.6-3.8 at Speed 4800. PI  3.9-5.2 Power 3.1-3.2  No alarms    Restraints Bilateral soft restraints w/o signs of injury.       Skin: Patient remains unable to turn 2/2 open chest. No signs of alterations in skin integrity with limited exam; heels offloaded with boots. CHG bath given.    Shift Events:     Lasix infusion increased to 15 mg/hr w/o improvement in UOP  Diuril 500mg administered with good response in UOP.  Nitroc weaned to 7.5ppm

## 2023-12-03 NOTE — ASSESSMENT & PLAN NOTE
Neuro/Psych:   -- Sedation: propofol 50mcg/kg/min  -- Pain:    -- fentanyl PRN, dilaudid 0.5 q2 PRN while intubated, Oxy PRN  -- wake up for neuro exam today              Cardiac:   -- S/P redo sternotomy LVAD with Dr. Washington on 12/01, chest remaining open  -- Plans for chest closure pending clinical progression on Monday   -- MAP Goal: 70-80, closer to 70  -- Cleviprex/Cardene PRN  -- Pressors: epi 0.8,  5   -- levo, vaso, shaji off  -- Anti-HTNs: Will start when appropriate  -- Rhythm: NSR  -- amiodarone 400mg daily  -- LVAD heartmate III: RPM 4800, flow 3.0-3.1, PI 5.1  -- Beta blocker: Will start when appropriate  -- Statin: Atorvastatin 40 mg QD  -- No ASA at this time      Pulmonary:   -- Goal SpO2 >92%  -- Intubated, minimal vent settings  -- Ketty weaned to 7.5. Goal to wean to 5 by return to OR Monday   -- Chest Tubes x 2 (2 Meds): output significantly lower   - right: 150 (340)   - left: 18 (34)  -- q6 ABGs and lactates      Renal:  -- Trend BUN/Cr   -- Maintain [place], record strict Is/Os  -- lasix gtt 15, diuril 500 overnight   -- UOP /hr, net - 1L  -- goal to be net neg 1.5-2L/24hr  -- additional 500 diuril now      Recent Labs   Lab 12/02/23 1938 12/02/23 2358 12/03/23  0359   BUN 46* 48* 50*   CREATININE 2.3* 2.4* 2.5*           FEN / GI:   -- Daily CMP, PRN K/Mag/Phos per protocol   -- Replace electrolytes as needed  -- Nutrition: NPO  -- OG tube ok for meds  -- Bowel Regimen: Miralax, docusate      ID:   -- Afebrile  -- WBC stable  -- Abx: cefepime. NO vanc  -- vanc level 7.8    Recent Labs   Lab 12/02/23 1938 12/02/23 2358 12/03/23  0359   WBC 13.75* 14.82* 16.50*           Heme/Onc:   -- Hgb 8.7 pre-operatively  -- received 7 RBC, 2 FFP, 1 platelet, 10 cry intra-op  -- 12/1: 1 RBC, 4 FFP, 2 plts, 1 cryo  -- q6 CBC and coags  -- q4 fibrinogen - keep fibrinogen >300  -- holding ASA    Recent Labs   Lab 12/02/23  1938 12/02/23  2358 12/03/23  0359   HGB 7.8* 7.8* 7.8*    143* 157    APTT 24.3 26.6 26.2   INR 1.0 1.0 1.1           Endocrine:   -- CTS Goal -140  -- HgbA1c: 7.6  -- Endocrinology consulted for insulin management      PPx:   Feeding: NPO  Analgesia/Sedation: fent, dilaudid / Prop  Thromboembolic Prevention: SCDs  HOB >30: Yes  Stress Ulcer: pantoprazole   Glucose Control: Yes, insulin management per Endocrinology  Bowel reg:   Invasive Lines/Drains/Airway:   ETT  OGT  Left radial arterial line  RIJ mac w/ slick + jeanna CANASJ quad CVC  [place]  Chest Tubes: 2 Mediastinal  LVAD     Dispo/Code Status/Palliative:     - Continue SICU Care    - Full Code

## 2023-12-03 NOTE — SUBJECTIVE & OBJECTIVE
"Interval HPI:   Overnight events: No acute events overnight. Patient in room 38970/73617 A. Blood glucose stable. BG at goal on current insulin regimen (IIP). Steroid use- None. 2 Days Post-Op  Renal function- Abnormal    Vasopressors-  Epinephrine       Endocrine will continue to follow and manage insulin orders inpatient.         Diet NPO Except for: Medication     Eating:   NPO  Nausea: No  Hypoglycemia and intervention: No  Fever: No  TPN and/or TF: No      BP (!) 88/0 (BP Location: Right arm, Patient Position: Lying)   Pulse 90   Temp 99.2 °F (37.3 °C) (Oral)   Resp 16   Ht 6' (1.829 m)   Wt 77.5 kg (170 lb 13.7 oz)   SpO2 100%   BMI 23.17 kg/m²     Labs Reviewed and Include    Recent Labs   Lab 12/03/23 0359   *   CALCIUM 8.2*   ALBUMIN 2.6*   PROT 5.2*      K 4.9   CO2 20*      BUN 50*   CREATININE 2.5*   ALKPHOS 68   ALT 38   AST 69*   BILITOT 0.8     Lab Results   Component Value Date    WBC 16.50 (H) 12/03/2023    HGB 7.8 (L) 12/03/2023    HCT 22.9 (L) 12/03/2023    MCV 86 12/03/2023     12/03/2023     No results for input(s): "TSH", "FREET4" in the last 168 hours.  Lab Results   Component Value Date    HGBA1C 7.6 (H) 10/12/2023       Nutritional status:   Body mass index is 23.17 kg/m².  Lab Results   Component Value Date    ALBUMIN 2.6 (L) 12/03/2023    ALBUMIN 2.6 (L) 12/02/2023    ALBUMIN 2.6 (L) 12/02/2023     Lab Results   Component Value Date    PREALBUMIN 19 (L) 12/01/2023    PREALBUMIN 27 11/15/2023       Estimated Creatinine Clearance: 34 mL/min (A) (based on SCr of 2.5 mg/dL (H)).    Accu-Checks  Recent Labs     12/02/23  2300 12/02/23  2356 12/03/23  0056 12/03/23  0115 12/03/23  0158 12/03/23  0259 12/03/23  0357 12/03/23  0501 12/03/23  0607 12/03/23  0756   POCTGLUCOSE 132* 119* 95 94 124* 153* 189* 237* 222* 232*       Current Medications and/or Treatments Impacting Glycemic Control  Immunotherapy:    Immunosuppressants       None          Steroids: "   Hormones (From admission, onward)      None          Pressors:    Autonomic Drugs (From admission, onward)      Start     Stop Route Frequency Ordered    12/01/23 1625  EPINEPHrine 5 mg in dextrose 5% 250 mL infusion (premix)        Question Answer Comment   Begin at (in mcg/kg/min): 0.1    Titrate by: (in mcg/kg/min) 0.01    Titrate interval: (in minutes) 5    Titrate to maintain: (SBP or MAP or Cardiac Index) MAP    Greater than: (in mmHg) 65    Maximum dose: (in mcg/kg/min) 2        -- IV Continuous 12/01/23 1625          Hyperglycemia/Diabetes Medications:   Antihyperglycemics (From admission, onward)      Start     Stop Route Frequency Ordered    12/03/23 0900  insulin regular in 0.9 % NaCl 100 unit/100 mL (1 unit/mL) infusion        Question:  Enter initial dose (Units/hr):  Answer:  1.4    -- IV Continuous 12/03/23 0752    12/03/23 0852  insulin aspart U-100 pen 0-5 Units         -- SubQ As needed (PRN) 12/03/23 0751

## 2023-12-03 NOTE — PROGRESS NOTES
12/03/2023  Monet Aguiar    Current provider:  Yuri Washington MD    Device interrogation:      12/3/2023    10:00 AM 12/3/2023     9:00 AM 12/3/2023     8:00 AM 12/3/2023     7:01 AM 12/3/2023     6:00 AM 12/3/2023     5:00 AM 12/3/2023     4:00 AM   TXP LVAD INTERROGATIONS   Type HeartMate3 HeartMate3 HeartMate3 HeartMate3 HeartMate3 HeartMate3 HeartMate3   Flow 3.7 3.6 3.7 3.7 3.7 3.7 3.7   Speed 4800 4800 4800 4800 4800 4800 4800   PI 4.3 4.2 4 4.1 4.2 4.2 3.9   Power ([place]) 3.1 3.1 3.1 3.1 3.2 3.2 3.1   LSL 4400 4400 4400 4400 4400 4400 4400   Pulsatility Intermittent pulse Intermittent pulse Intermittent pulse Intermittent pulse Intermittent pulse Intermittent pulse Intermittent pulse          Rounded on [facility] to ensure all mechanical assist device settings (IABP or VAD) were appropriate and all parameters were within limits.  I was able to ensure all back up equipment was present, the staff had no issues, and the Perfusion Department daily rounding was complete.      For implantable VADs: Interrogation of Ventricular assist device was performed with analysis of device parameters and review of device function. I have personally reviewed the interrogation findings and agree with findings as stated.     In emergency, the nursing units have been notified to contact the perfusion department either by:  Calling [phone] from 630am to 4pm Mon thru Fri, utilizing the On-Call Finder functionality of Epic and searching for Perfusion, or by contacting the hospital  from 4pm to 630am and on weekends and asking to speak with the perfusionist on call.    10:57 AM

## 2023-12-03 NOTE — PLAN OF CARE
SICU PLAN OF CARE NOTE    Dx: Acute on chronic combined systolic and diastolic CHF, NYHA class 4    Goals of Care: MAP 70-80    Vital Signs (last 12 hours):   Temp:  [97.8 °F (36.6 °C)-99.2 °F (37.3 °C)]   Pulse:  [83-93]   Resp:  [16-19]   BP: (86-88)/(0)   SpO2:  [100 %]   Arterial Line BP: (80-91)/(60-70)      Neuro: Sedated and Moves All Extremities    Cardiac: NSR    Respiratory: Ventilator 40/5 Nitric at 7    Gtts: Epinephrine , Dobutamine, Lasix, Propofol, Insulin, and Cardene    Urine Output: Urinary Catheter 2140 cc/shift    Drains: Chest Tube, total output 144 cc /  shift    Diet: NPO    VAD: HeartMate 3. Speed 4800.     Labs/Accuchecks: Labs Q6, accuchecks Q4, ABGs Q6    Skin: Unable to turn, open chest. Heel boots on. Pt on immerse mattress    Shift Events: Epi weaned to 0.06 from 0.08. Lasix increased to 20, pt continued to diurese per order. Nitric attempted to wean. See flowsheet for assessment details.

## 2023-12-03 NOTE — PROGRESS NOTES
[facility] - Surgical Intensive Care  Endocrinology  Progress Note    Admit Date: 11/9/2023     Reason for Consult: Management of T2DM, Hyperglycemia     Surgical Procedure and Date: n/a    Diabetes diagnosis year: 1998    Home Diabetes Medications:  Metformin (off since October)   Lab Results   Component Value Date    HGBA1C 7.6 (H) 10/12/2023       How often checking glucose at home?  Once daily in the AM    BG readings on regimen: 150-160s  Hypoglycemia on the regimen?  No  Missed doses on regimen?  n/a    Diabetes Complications include:     Hyperglycemia    Complicating diabetes co morbidities:   CAD s/p CABG, HTN, HLD      HPI:   Patient is a 63 y.o. male with a diagnosis of CAD s/p 3v CABG (unclear anatomy, 2009), ICM with a recent EF of 15-20% s/p ICD (medtronik 2009), DM2 (a1c 7.7), HTN, HLD, Vfib on amio who presents to the ED with CC of SOB. In the ED he was AF with stable VS on RA.  CBC showing chronic anemia.  CMP notable for acute on chronic CKD with baseline around 2.1 and Cr now 2.6.  BNP elevated.  Lactate neg.  CXR showing pulmonary edema. He was subsequently admitted to the CCU for diuresis.  Endocrinology consulted for management of T2DM.          Interval HPI:   Overnight events: No acute events overnight. Patient in room 18682/85033 A. Blood glucose stable. BG at and above goal on current insulin regimen (IIP). Steroid use- None. 2 Days Post-Op  Renal function- Abnormal - Creatinine 2.5   Vasopressors-  Epinephrine       Endocrine will continue to follow and manage insulin orders inpatient.         Diet NPO Except for: Medication     Eating:   NPO  Nausea: No  Hypoglycemia and intervention: No  Fever: No  TPN and/or TF: No      BP (!) 88/0 (BP Location: Right arm, Patient Position: Lying)   Pulse 90   Temp 99.2 °F (37.3 °C) (Oral)   Resp 16   Ht 6' (1.829 m)   Wt 77.5 kg (170 lb 13.7 oz)   SpO2 100%   BMI 23.17 kg/m²     Labs Reviewed and Include    Recent Labs   Lab 12/03/23  0359   GLU  "181*   CALCIUM 8.2*   ALBUMIN 2.6*   PROT 5.2*      K 4.9   CO2 20*      BUN 50*   CREATININE 2.5*   ALKPHOS 68   ALT 38   AST 69*   BILITOT 0.8     Lab Results   Component Value Date    WBC 16.50 (H) 12/03/2023    HGB 7.8 (L) 12/03/2023    HCT 22.9 (L) 12/03/2023    MCV 86 12/03/2023     12/03/2023     No results for input(s): "TSH", "FREET4" in the last 168 hours.  Lab Results   Component Value Date    HGBA1C 7.6 (H) 10/12/2023       Nutritional status:   Body mass index is 23.17 kg/m².  Lab Results   Component Value Date    ALBUMIN 2.6 (L) 12/03/2023    ALBUMIN 2.6 (L) 12/02/2023    ALBUMIN 2.6 (L) 12/02/2023     Lab Results   Component Value Date    PREALBUMIN 19 (L) 12/01/2023    PREALBUMIN 27 11/15/2023       Estimated Creatinine Clearance: 34 mL/min (A) (based on SCr of 2.5 mg/dL (H)).    Accu-Checks  Recent Labs     12/02/23  2300 12/02/23  2356 12/03/23  0056 12/03/23  0115 12/03/23  0158 12/03/23  0259 12/03/23  0357 12/03/23  0501 12/03/23  0607 12/03/23  0756   POCTGLUCOSE 132* 119* 95 94 124* 153* 189* 237* 222* 232*       Current Medications and/or Treatments Impacting Glycemic Control  Immunotherapy:    Immunosuppressants       None          Steroids:   Hormones (From admission, onward)      None          Pressors:    Autonomic Drugs (From admission, onward)      Start     Stop Route Frequency Ordered    12/01/23 1625  EPINEPHrine 5 mg in dextrose 5% 250 mL infusion (premix)        Question Answer Comment   Begin at (in mcg/kg/min): 0.1    Titrate by: (in mcg/kg/min) 0.01    Titrate interval: (in minutes) 5    Titrate to maintain: (SBP or MAP or Cardiac Index) MAP    Greater than: (in mmHg) 65    Maximum dose: (in mcg/kg/min) 2        -- IV Continuous 12/01/23 1625          Hyperglycemia/Diabetes Medications:   Antihyperglycemics (From admission, onward)      Start     Stop Route Frequency Ordered    12/03/23 0900  insulin regular in 0.9 % NaCl 100 unit/100 mL (1 unit/mL) infusion   "      Question:  Enter initial dose (Units/hr):  Answer:  1.4    -- IV Continuous 12/03/23 0752    12/03/23 0852  insulin aspart U-100 pen 0-5 Units         -- SubQ As needed (PRN) 12/03/23 0752            ASSESSMENT and PLAN    Cardiac/Vascular  * Acute on chronic combined systolic and diastolic CHF, NYHA class 4  Managed per primary team  Optimize BG control    PAF (paroxysmal atrial fibrillation)  May increase insulin resistance.         Renal/  IVÁN (acute kidney injury)  Lab Results   Component Value Date    CREATININE 2.5 (H) 12/03/2023     Avoid insulin stacking  Titrate insulin slowly       Endocrine  Type 2 diabetes mellitus without complication, without long-term current use of insulin  Endocrinology consulted for BG management.   BG goal 140-180     - D/C IIP  - Transition drip at 1.4 units/hr with step-down parameters.   - Novolog (aspart) insulin prn for BG excursions LDC SSI (150/50).  - BG checks q4hr  - Hypoglycemia protocol in place    ** Please notify Endocrine for any change and/or advance in diet**  ** Please call Endocrine for any BG related issues **    Discharge Planning:   TBD. Please notify endocrinology prior to discharge.               Erasmo Parrish, DNP, FNP  Endocrinology  [facility] - [facility]ensive [facility]

## 2023-12-03 NOTE — PROCEDURES
Interrogation of Ventricular assist device was performed with physician analysis of device parameters and review of device function. I have personally reviewed the interrogation findings and agree with findings as stated.   Procedure: Device Interrogation Including analysis of device parameters  Current Settings: Ventricular Assist Device  Review of device function is stable      12/3/2023    12:00 PM 12/3/2023    11:00 AM 12/3/2023    10:00 AM 12/3/2023     9:00 AM 12/3/2023     8:00 AM 12/3/2023     7:01 AM 12/3/2023     6:00 AM   TXP LVAD INTERROGATIONS   Type HeartMate3 HeartMate3 HeartMate3 HeartMate3 HeartMate3 HeartMate3 HeartMate3   Flow 3.7 3.7 3.7 3.6 3.7 3.7 3.7   Speed 4800 4800 4800 4800 4800 4800 4800   PI 4.8 4.8 4.3 4.2 4 4.1 4.2   Power ([place]) 3.2 3.1 3.1 3.1 3.1 3.1 3.2   LSL 4400 4400 4400 4400 4400 4400 4400   Pulsatility Intermittent pulse Intermittent pulse Intermittent pulse Intermittent pulse Intermittent pulse Intermittent pulse Intermittent pulse      Brayan Ocampo MD

## 2023-12-03 NOTE — ASSESSMENT & PLAN NOTE
Procedure: Device Interrogation Including analysis of device parameters  Current Settings: Ventricular Assist Device  Review of device function is stable/unstable stable        12/3/2023     8:00 AM 12/3/2023     7:01 AM 12/3/2023     6:00 AM 12/3/2023     5:00 AM 12/3/2023     4:00 AM 12/3/2023     3:00 AM 12/3/2023     2:00 AM   TXP LVAD INTERROGATIONS   Type HeartMate3 HeartMate3 HeartMate3 HeartMate3 HeartMate3 HeartMate3 HeartMate3   Flow 3.7 3.7 3.7 3.7 3.7 3.7 3.7   Speed 4800 4800 4800 4800 4800 4800 4800   PI 4 4.1 4.2 4.2 3.9 4.2 4.5   Power ([place]) 3.1 3.1 3.2 3.2 3.1 3.2 3.1   LSL 4400 4400 4400 4400 4400 4400 4400   Pulsatility Intermittent pulse Intermittent pulse Intermittent pulse Intermittent pulse Intermittent pulse Intermittent pulse Intermittent pulse

## 2023-12-03 NOTE — ASSESSMENT & PLAN NOTE
Lab Results   Component Value Date    CREATININE 2.5 (H) 12/03/2023     Avoid insulin stacking  Titrate insulin slowly

## 2023-12-03 NOTE — SUBJECTIVE & OBJECTIVE
Interval History: Patient POD2 s/p HM3 LVAD implant. Net -I've 1.3L fluid balance over 24 hours. Diuresing with lasix and diuril. CVP down to 5. Kidney function stable. Epi down to .08, remains on  5, Ketty reduced to 7.5.       Continuous Infusions:   sodium chloride 0.9% 10 mL/hr at 23    DOBUTamine 5 mcg/kg/min (23)    EPINEPHrine 0.08 mcg/kg/min (23)    furosemide (LASIX) 2 mg/mL continuous infusion (non-titrating) 15 mg/hr (23)    insulin regular 1 units/mL infusion orderable (TRANSFER) 1.4 Units/hr (23)    nicardipine 3 mg/hr (23)    nitric oxide gas      propofoL 50 mcg/kg/min (23)     Scheduled Meds:   amiodarone  400 mg Oral Daily    atorvastatin  40 mg Oral Daily    ceFEPime (MAXIPIME) IVPB  1 g Intravenous Q8H    dextrose 10%  25 g Intravenous Once    docusate sodium  200 mg Oral QHS    famotidine (PF)  20 mg Intravenous Daily    ferrous gluconate  324 mg Oral Daily with breakfast    lactulose  15 g Oral Once    mupirocin   Nasal BID    pantoprazole  40 mg Intravenous Daily    polyethylene glycol  17 g Oral Daily    senna  8.6 mg Oral Daily     PRN Meds:acetaminophen, albumin human 5%, albuterol sulfate, baclofen, bisacodyL, dextrose 10%, dextrose 10%, dextrose 10%, [] fentaNYL **FOLLOWED BY** fentaNYL, gabapentin, glucagon (human recombinant), glucose, glucose, HYDROmorphone, insulin aspart U-100, magnesium hydroxide 400 mg/5 ml, magnesium sulfate IVPB, mupirocin, ondansetron, oxyCODONE, oxyCODONE, potassium chloride, potassium chloride, senna-docusate 8.6-50 mg, sodium chloride 0.9%    Review of patient's allergies indicates:  No Known Allergies  Objective:     Vital Signs (Most Recent):  Temp: 99.2 °F (37.3 °C) (23 0700)  Pulse: 90 (23 08)  Resp: 16 (23)  BP: (!) 88/0 (23 07)  SpO2: 100 % (23) Vital Signs (24h Range):  Temp:  [98.7 °F (37.1 °C)-99.7 °F (37.6 °C)] 99.2 °F  (37.3 °C)  Pulse:  [84-99] 90  Resp:  [6-26] 16  SpO2:  [96 %-100 %] 100 %  BP: (82-88)/(0) 88/0  Arterial Line BP: (79-91)/(52-73) 84/66     Patient Vitals for the past 72 hrs (Last 3 readings):   Weight   12/01/23 0344 77.5 kg (170 lb 13.7 oz)       Body mass index is 23.17 kg/m².      Intake/Output Summary (Last 24 hours) at 12/3/2023 0853  Last data filed at 12/3/2023 0800  Gross per 24 hour   Intake 2640.28 ml   Output 3699 ml   Net -1058.72 ml         Hemodynamic Parameters:  PAP: (19-28)/(12-22) 28/22  PAP (Mean):  [19 mmHg-26 mmHg] 26 mmHg    Telemetry: NSR        Physical Exam  Constitutional:       Comments: Intubated and sedated   HENT:      Head: Normocephalic and atraumatic.   Cardiovascular:      Rate and Rhythm: Normal rate and regular rhythm.      Comments: LVAD hum  Pulmonary:      Effort: Pulmonary effort is normal.      Breath sounds: Normal breath sounds.      Comments: Mechanically ventilated  Chest:      Comments: CT x2  Abdominal:      General: Abdomen is flat. Bowel sounds are normal. There is no distension.      Palpations: Abdomen is soft.   Musculoskeletal:         General: Normal range of motion.      Cervical back: Neck supple.   Skin:     General: Skin is warm and dry.   Neurological:      Comments: sedated            Significant Labs:  CBC:  Recent Labs   Lab 12/02/23 1938 12/02/23 2358 12/03/23 0359 12/03/23  0759   WBC 13.75* 14.82* 16.50*  --    RBC 2.68* 2.64* 2.65*  --    HGB 7.8* 7.8* 7.8*  --    HCT 21.9* 22.0* 22.9* 21*    143* 157  --    MCV 82 83 86  --    MCH 29.1 29.5 29.4  --    MCHC 35.6 35.5 34.1  --        BNP:  Recent Labs   Lab 11/29/23  1834   *       CMP:  Recent Labs   Lab 12/02/23 1938 12/02/23 2358 12/03/23 0359   * 116* 181*   CALCIUM 8.3* 8.5* 8.2*   ALBUMIN 2.6* 2.6* 2.6*   PROT 5.7* 5.8* 5.2*    140 139   K 4.6 4.4 4.9   CO2 21* 21* 20*    108 106   BUN 46* 48* 50*   CREATININE 2.3* 2.4* 2.5*   ALKPHOS 62 63 68   ALT 35  39 38   AST 81* 74* 69*   BILITOT 0.8 0.7 0.8        Coagulation:   Recent Labs   Lab 12/02/23  1938 12/02/23  2358 12/03/23  0359   INR 1.0 1.0 1.1   APTT 24.3 26.6 26.2       LDH:  Recent Labs   Lab 12/01/23  1851   *       Microbiology:  Microbiology Results (last 7 days)       Procedure Component Value Units Date/Time    Blood culture [[id number]] Collected: 11/28/23 1452    Order Status: Completed Specimen: Blood from Peripheral, Upper Arm, Right Updated: 12/02/23 1612     Blood Culture, Routine No growth to date      No Growth to date      No Growth to date      No Growth to date    Blood culture [[id number]] Collected: 11/28/23 1450    Order Status: Completed Specimen: Blood from Peripheral, Forearm, Right Updated: 12/02/23 1612     Blood Culture, Routine No growth to date      No Growth to date      No Growth to date      No Growth to date            BMP:   Recent Labs   Lab 12/03/23  0359   *      K 4.9      CO2 20*   BUN 50*   CREATININE 2.5*   CALCIUM 8.2*   MG 2.4       I have reviewed all pertinent labs within the past 24 hours.    Estimated Creatinine Clearance: 34 mL/min (A) (based on SCr of 2.5 mg/dL (H)).    Diagnostic Results:  I have reviewed all pertinent imaging results/findings within the past 24 hours.

## 2023-12-03 NOTE — PROGRESS NOTES
[facility]  Heart Transplant  Progress Note    Patient Name: Radha Abbott  MRN: [id number]  Admission Date: 2023  Hospital Length of Stay: 24 days  Attending Physician: Yuri Washington MD  Primary Care Provider: Vasu Kong MD  Principal Problem:Acute on chronic combined systolic and diastolic CHF, NYHA class 4    Subjective:   Interval History: Patient POD2 s/p HM3 LVAD implant. Net -I've 1.3L fluid balance over 24 hours. Diuresing with lasix and diuril. CVP down to 5. Kidney function stable. Epi down to .08, remains on  5, Ketty reduced to 7.5.       Continuous Infusions:   sodium chloride 0.9% 10 mL/hr at 23 0800    DOBUTamine 5 mcg/kg/min (23 08)    EPINEPHrine 0.08 mcg/kg/min (23)    furosemide (LASIX) 2 mg/mL continuous infusion (non-titrating) 15 mg/hr (23)    insulin regular 1 units/mL infusion orderable (TRANSFER) 1.4 Units/hr (23)    nicardipine 3 mg/hr (23)    nitric oxide gas      propofoL 50 mcg/kg/min (23)     Scheduled Meds:   amiodarone  400 mg Oral Daily    atorvastatin  40 mg Oral Daily    ceFEPime (MAXIPIME) IVPB  1 g Intravenous Q8H    dextrose 10%  25 g Intravenous Once    docusate sodium  200 mg Oral QHS    famotidine (PF)  20 mg Intravenous Daily    ferrous gluconate  324 mg Oral Daily with breakfast    lactulose  15 g Oral Once    mupirocin   Nasal BID    pantoprazole  40 mg Intravenous Daily    polyethylene glycol  17 g Oral Daily    senna  8.6 mg Oral Daily     PRN Meds:acetaminophen, albumin human 5%, albuterol sulfate, baclofen, bisacodyL, dextrose 10%, dextrose 10%, dextrose 10%, [] fentaNYL **FOLLOWED BY** fentaNYL, gabapentin, glucagon (human recombinant), glucose, glucose, HYDROmorphone, insulin aspart U-100, magnesium hydroxide 400 mg/5 ml, magnesium sulfate IVPB, mupirocin, ondansetron, oxyCODONE, oxyCODONE, potassium chloride, potassium chloride, senna-docusate 8.6-50  mg, sodium chloride 0.9%    Review of patient's allergies indicates:  No Known Allergies  Objective:     Vital Signs (Most Recent):  Temp: 99.2 °F (37.3 °C) (12/03/23 0700)  Pulse: 90 (12/03/23 0800)  Resp: 16 (12/03/23 0825)  BP: (!) 88/0 (12/03/23 0700)  SpO2: 100 % (12/03/23 0800) Vital Signs (24h Range):  Temp:  [98.7 °F (37.1 °C)-99.7 °F (37.6 °C)] 99.2 °F (37.3 °C)  Pulse:  [84-99] 90  Resp:  [6-26] 16  SpO2:  [96 %-100 %] 100 %  BP: (82-88)/(0) 88/0  Arterial Line BP: (79-91)/(52-73) 84/66     Patient Vitals for the past 72 hrs (Last 3 readings):   Weight   12/01/23 0344 77.5 kg (170 lb 13.7 oz)       Body mass index is 23.17 kg/m².      Intake/Output Summary (Last 24 hours) at 12/3/2023 0853  Last data filed at 12/3/2023 0800  Gross per 24 hour   Intake 2640.28 ml   Output 3699 ml   Net -1058.72 ml         Hemodynamic Parameters:  PAP: (19-28)/(12-22) 28/22  PAP (Mean):  [19 mmHg-26 mmHg] 26 mmHg    Telemetry: NSR        Physical Exam  Constitutional:       Comments: Intubated and sedated   HENT:      Head: Normocephalic and atraumatic.   Cardiovascular:      Rate and Rhythm: Normal rate and regular rhythm.      Comments: LVAD hum  Pulmonary:      Effort: Pulmonary effort is normal.      Breath sounds: Normal breath sounds.      Comments: Mechanically ventilated  Chest:      Comments: CT x2  Abdominal:      General: Abdomen is flat. Bowel sounds are normal. There is no distension.      Palpations: Abdomen is soft.   Musculoskeletal:         General: Normal range of motion.      Cervical back: Neck supple.   Skin:     General: Skin is warm and dry.   Neurological:      Comments: sedated            Significant Labs:  CBC:  Recent Labs   Lab 12/02/23  1938 12/02/23  2358 12/03/23  0359 12/03/23  0759   WBC 13.75* 14.82* 16.50*  --    RBC 2.68* 2.64* 2.65*  --    HGB 7.8* 7.8* 7.8*  --    HCT 21.9* 22.0* 22.9* 21*    143* 157  --    MCV 82 83 86  --    MCH 29.1 29.5 29.4  --    MCHC 35.6 35.5 34.1  --         BNP:  Recent Labs   Lab 11/29/23  1834   *       CMP:  Recent Labs   Lab 12/02/23 1938 12/02/23 2358 12/03/23  0359   * 116* 181*   CALCIUM 8.3* 8.5* 8.2*   ALBUMIN 2.6* 2.6* 2.6*   PROT 5.7* 5.8* 5.2*    140 139   K 4.6 4.4 4.9   CO2 21* 21* 20*    108 106   BUN 46* 48* 50*   CREATININE 2.3* 2.4* 2.5*   ALKPHOS 62 63 68   ALT 35 39 38   AST 81* 74* 69*   BILITOT 0.8 0.7 0.8        Coagulation:   Recent Labs   Lab 12/02/23 1938 12/02/23 2358 12/03/23 0359   INR 1.0 1.0 1.1   APTT 24.3 26.6 26.2       LDH:  Recent Labs   Lab 12/01/23  1851   *       Microbiology:  Microbiology Results (last 7 days)       Procedure Component Value Units Date/Time    Blood culture [[id number]] Collected: 11/28/23 1452    Order Status: Completed Specimen: Blood from Peripheral, Upper Arm, Right Updated: 12/02/23 1612     Blood Culture, Routine No growth to date      No Growth to date      No Growth to date      No Growth to date    Blood culture [[id number]] Collected: 11/28/23 1450    Order Status: Completed Specimen: Blood from Peripheral, Forearm, Right Updated: 12/02/23 1612     Blood Culture, Routine No growth to date      No Growth to date      No Growth to date      No Growth to date            BMP:   Recent Labs   Lab 12/03/23  0359   *      K 4.9      CO2 20*   BUN 50*   CREATININE 2.5*   CALCIUM 8.2*   MG 2.4       I have reviewed all pertinent labs within the past 24 hours.    Estimated Creatinine Clearance: 34 mL/min (A) (based on SCr of 2.5 mg/dL (H)).    Diagnostic Results:  I have reviewed all pertinent imaging results/findings within the past 24 hours.  Assessment and Plan:     63 year old male with hx of CAD s/p 3v CABG (unclear anatomy, 2009), ICM with a recent EF of 15-20% s/p ICD (medtronik 2009), DM2 (a1c 7.7), HTN, HLD, Vfib on amio who presents to the ED with CC of SOB     Pt was recently admitted to [facility] as a transfer.  He was started on a Lasix gtt  and did well.  Started on gDMT and discharged home on  5 with plans to follow up in [facility] clinic for ongoing transplant evaluation at another facility.  He says that about a few days ago he started to notice LE swelling.  This turned into Nelson and orthopnea.  He says he can't walk to the bathroom without getting SOB.  He also complains of weight gain.  He takes torsemide 20mg BID at home and was told to trial additional Lasix which he did without any improvement.  He was rx metolazone but has not been filled.  He came to the ED     In the ED he was AF with stable VS on RA.  CBC showing chronic anemia.  CMP notable for acute on chronic CKD with baseline around 2.1 and Cr now 2.6.  BNP elevated.  Lactate neg.  CXR showing pulmonary edema.  I evaluated the pt at the bedside.  Bedside TTE showing CVP >15.  He was subsequently admitted to the CCU for diuresis.     He was continued on his home  and was started on a lasix gtt, which he responded well to overnight (net -1700cc. He feels much better this morning as well. Our transplant coordinators have been working on insurance approval and he is now being transferred to [facility] service for transplant evaluation.     * Acute on chronic combined systolic and diastolic CHF, NYHA class 4  Pt with known ICM with an EF of 25% on home  presented with decompensated HF.  Not in cardiogenic shock    RHC 11/14: RA 14, PA 70/35, PCWP 32, CO 4.1, CI 2.1.   Repeat RHC 11/20 RA RA  20  PA 60/29 (39)  PCWP 29    CO  4.6 l/min  CI  2.3 l/min/m2  2D echo 11/21 showed EF 15-20%, mild RV dysfunction, mild MR, LVEDD 6.9     Repeat on 11/24 showed EF 10-15%, RV mildly reduced, mild-mod MR, LVEDD 6.42  - Home  gtt at 5  - home GDMT: entresto 24/26mg BID (on hold 2/2 IVÁN), Hydralazine 25 q8h being continued  - home diuretics: Was taking lasix 40 bid  - Bridged to LVAD with IABP       -PT/OT and nutrition   -s/p LVAD (12/1)  - CTS primary          LVAD (left ventricular assist device)  present  Procedure: Device Interrogation Including analysis of device parameters  Current Settings: Ventricular Assist Device  Review of device function is stable/unstable stable        12/3/2023     8:00 AM 12/3/2023     7:01 AM 12/3/2023     6:00 AM 12/3/2023     5:00 AM 12/3/2023     4:00 AM 12/3/2023     3:00 AM 12/3/2023     2:00 AM   TXP LVAD INTERROGATIONS   Type HeartMate3 HeartMate3 HeartMate3 HeartMate3 HeartMate3 HeartMate3 HeartMate3   Flow 3.7 3.7 3.7 3.7 3.7 3.7 3.7   Speed 4800 4800 4800 4800 4800 4800 4800   PI 4 4.1 4.2 4.2 3.9 4.2 4.5   Power ([place]) 3.1 3.1 3.2 3.2 3.1 3.2 3.1   LSL 4400 4400 4400 4400 4400 4400 4400   Pulsatility Intermittent pulse Intermittent pulse Intermittent pulse Intermittent pulse Intermittent pulse Intermittent pulse Intermittent pulse         PAF (paroxysmal atrial fibrillation)  Known hx of pAF. In sinus rhythm on admission, but 1 run of AF RVR overnight. He spontaneously converted.  - Continue home amiodarone 400mg qd  - Stop home Eliquis and continue heparin gtt while in the hospital.       IVÁN (acute kidney injury)  IVÁN on CKD2. Baseline Cr of 1.7-2.0.   - Hold ARNi, entresto  -Trend BMPs daily post VAD    Type 2 diabetes mellitus without complication, without long-term current use of insulin  -A1c 7.6, not insulin dependent  - MDSSI  -diabetic diet ordered/Boost glucose control   - Endocrine following, appreciate assistance with blood glucose management    CAD (coronary artery disease)  -S/p 3vCABG in 2009  - continue home ASA, HI statin    Ventricular tachycardia  Hx VT s/p ICD placement (medtronic 2009)  - Continue home amio 400mg qd      Uninterrupted Critical Care/Counseling Time (not including procedures): 60 min.       Jimy An PA-C  Heart Transplant  [facility]

## 2023-12-03 NOTE — PROGRESS NOTES
[facility] - [facility]  Critical Care - Surgery  Progress Note    Patient Name: Radha Abbott  MRN: [id number]  Admission Date: 11/9/2023  Hospital Length of Stay: 24 days  Code Status: Full Code  Attending Provider: Yuri Washington MD  Primary Care Provider: Vasu Kong MD   Principal Problem: Acute on chronic combined systolic and diastolic CHF, NYHA class 4    Subjective:     Hospital/ICU Course:  No notes on file    Interval History/Significant Events: NAEO. 0.08 epi, 5 , Nitric weaned to 7.5. Lasix up to 15 and given diuril with good UOP response. Flows and PI stable.     Follow-up For: Procedure(s) (LRB):  INSERTION-LEFT VENTRICULAR ASSIST DEVICE (Left)  STERNOTOMY, REDO (N/A)  VALVULOPLASTY, MITRAL VALVE (N/A)  LYSIS, ADHESIONS  REPAIR, AORTIC VALVE (N/A)  CLOSURE, TEMPORARY (N/A)  ANGIOPLASTY-VENOUS ARTERY, RIGHT FEMORAL (Right)  REPAIR, ANEURYSM, ARTERY, FEMORAL (Right)    Post-Operative Day: 1 Day Post-Op    Objective:     Vital Signs (Most Recent):  Temp: 99.7 °F (37.6 °C) (12/03/23 0301)  Pulse: 92 (12/03/23 0600)  Resp: 16 (12/03/23 0600)  BP: (!) 84/0 (12/03/23 0331)  SpO2: 100 % (12/03/23 0600) Vital Signs (24h Range):  Temp:  [98.7 °F (37.1 °C)-99.7 °F (37.6 °C)] 99.7 °F (37.6 °C)  Pulse:  [84-99] 92  Resp:  [6-26] 16  SpO2:  [96 %-100 %] 100 %  BP: (82-88)/(0) 84/0  Arterial Line BP: (79-93)/(52-73) 84/66     Weight:  (Bed scale will not turn on)  Body mass index is 23.17 kg/m².      Intake/Output Summary (Last 24 hours) at 12/3/2023 0601  Last data filed at 12/3/2023 0600  Gross per 24 hour   Intake 2585.76 ml   Output 3659 ml   Net -1073.24 ml            Physical Exam  Vitals and nursing note reviewed.   Constitutional:       Comments: sedated   Cardiovascular:      Rate and Rhythm: Normal rate and regular rhythm.      Comments: Open chest, small amount of blood pooling at top and bottom of incision, stable   SS, minimal output  Doppler-able PT and DP bilaterally. More  diminished on the R leg  Pulmonary:      Comments: Intubated, minimal settings   Abdominal:      General: There is no distension.      Palpations: Abdomen is soft.   Genitourinary:     Comments: goodman  Musculoskeletal:      Right lower leg: No edema.      Left lower leg: No edema.   Skin:     General: Skin is warm and dry.   Neurological:      Comments: Sedated, moves all extremities             Vents:  Vent Mode: A/C (12/03/23 0405)  Ventilator Initiated: Yes (12/01/23 1529)  Set Rate: 16 BPM (12/03/23 0405)  Vt Set: 490 mL (12/03/23 0405)  PEEP/CPAP: 5 cmH20 (12/03/23 0405)  Oxygen Concentration (%): 40 (12/03/23 0600)  Peak Airway Pressure: 23 cmH20 (12/03/23 0405)  Plateau Pressure: 18 cmH20 (12/03/23 0405)  Total Ve: 7.77 L/m (12/03/23 0405)  Negative Inspiratory Force (cm H2O): 0 (12/03/23 0405)  F/VT Ratio<105 (RSBI): (!) 33.4 (12/03/23 0405)    Lines/Drains/Airways       Central Venous Catheter Line  Duration              Introducer with Double Lumen 12/01/23 1554 Internal Jugular Right 1 day    Pulmonary Artery Catheter Assessment  12/01/23 1556 Internal Jugular Right 1 day    Trialysis (Dialysis) Catheter 12/01/23 1530 left internal jugular 1 day              Drain  Duration                  Chest Tube Tube - 1 Right Mediastinal -- days         Chest Tube 12/01/23 Tube - 2 Left Mediastinal 2 days         NG/OG Tube 12/01/23 1545 orogastric Center mouth 1 day         Urethral Catheter 12/01/23 0754 Temperature probe;Silicone;Non-latex 14 Fr. 1 day              Airway  Duration                  Airway - Non-Surgical 12/01/23 1534 Endotracheal Tube 1 day              Arterial Line  Duration             Arterial Line 12/01/23 1500 Left Radial 1 day              Line  Duration                  VAD 12/01/23 1015 Left ventricular assist device HeartMate 3 1 day              Peripheral Intravenous Line  Duration                  Peripheral IV - Single Lumen 11/29/23 1100 20 G Left Forearm 3 days         Peripheral  IV - Single Lumen 12/01/23 2227 20 G Right Hand 1 day                    Significant Labs:    CBC/Anemia Profile:  Recent Labs   Lab 12/02/23 1938 12/02/23 2358 12/03/23  0359   WBC 13.75* 14.82* 16.50*   HGB 7.8* 7.8* 7.8*   HCT 21.9* 22.0* 22.9*    143* 157   MCV 82 83 86   RDW 16.3* 16.3* 16.5*          Chemistries:  Recent Labs   Lab 12/02/23 1938 12/02/23 2358 12/03/23  0359    140 139   K 4.6 4.4 4.9    108 106   CO2 21* 21* 20*   BUN 46* 48* 50*   CREATININE 2.3* 2.4* 2.5*   CALCIUM 8.3* 8.5* 8.2*   ALBUMIN 2.6* 2.6* 2.6*   PROT 5.7* 5.8* 5.2*   BILITOT 0.8 0.7 0.8   ALKPHOS 62 63 68   ALT 35 39 38   AST 81* 74* 69*   MG 2.5 2.4 2.4   PHOS 5.2* 5.4* 6.4*         All pertinent labs within the past 24 hours have been reviewed.    Significant Imaging:  I have reviewed all pertinent imaging results/findings within the past 24 hours.  Assessment/Plan:     Cardiac/Vascular  * Acute on chronic combined systolic and diastolic CHF, NYHA class 4    Neuro/Psych:   -- Sedation: propofol 50mcg/kg/min  -- Pain:    -- fentanyl PRN, dilaudid 0.5 q2 PRN while intubated, Oxy PRN  -- wake up for neuro exam today              Cardiac:   -- S/P redo sternotomy LVAD with Dr. Washington on 12/01, chest remaining open  -- Plans for chest closure pending clinical progression on Monday   -- MAP Goal: 70-80, closer to 70  -- Cleviprex/Cardene PRN  -- Pressors: epi 0.08,  5   -- levo, vaso, shaji off   -- wean epi to 0.06 by AM for OR  -- Anti-HTNs: Will start when appropriate  -- Rhythm: NSR  -- amiodarone 400mg daily  -- LVAD heartmate III: RPM 4800, flow 3.0-3.1, PI 5.1  -- Beta blocker: Will start when appropriate  -- Statin: Atorvastatin 40 mg QD  -- No ASA at this time      Pulmonary:   -- Goal SpO2 >92%  -- Intubated, minimal vent settings  -- Ketty weaned to 7.5. Goal to wean to 5 by return to OR Monday   -- Chest Tubes x 2 (2 Meds): output significantly lower   - right: 150 (340)   - left: 18 (34)  -- q6 ABGs  and lactates      Renal:  -- Trend BUN/Cr   -- Maintain [place], record strict Is/Os  -- lasix gtt 20, diuril 500 overnight   -- UOP /hr, net - 1L  -- goal to be net neg 1.5-2L/24hr  -- additional 500 diuril now      Recent Labs   Lab 12/02/23 1938 12/02/23 2358 12/03/23  0359   BUN 46* 48* 50*   CREATININE 2.3* 2.4* 2.5*           FEN / GI:   -- Daily CMP, PRN K/Mag/Phos per protocol   -- Replace electrolytes as needed  -- Nutrition: NPO  -- OG tube ok for meds  -- Bowel Regimen: Miralax, docusate      ID:   -- Afebrile  -- WBC stable  -- Abx: cefepime. NO vanc  -- vanc level 7.8    Recent Labs   Lab 12/02/23 1938 12/02/23 2358 12/03/23  0359   WBC 13.75* 14.82* 16.50*           Heme/Onc:   -- Hgb 8.7 pre-operatively  -- received 7 RBC, 2 FFP, 1 platelet, 10 cry intra-op  -- 12/1: 1 RBC, 4 FFP, 2 plts, 1 cryo  -- q6 CBC and coags  -- q4 fibrinogen - keep fibrinogen >300  -- holding ASA    Recent Labs   Lab 12/02/23 1938 12/02/23 2358 12/03/23  0359   HGB 7.8* 7.8* 7.8*    143* 157   APTT 24.3 26.6 26.2   INR 1.0 1.0 1.1           Endocrine:   -- CTS Goal -140  -- HgbA1c: 7.6  -- Endocrinology consulted for insulin management      PPx:   Feeding: NPO  Analgesia/Sedation: fent, dilaudid / Prop  Thromboembolic Prevention: SCDs  HOB >30: Yes  Stress Ulcer: pantoprazole   Glucose Control: Yes, insulin management per Endocrinology  Bowel reg:   Invasive Lines/Drains/Airway:   ETT  OGT  Left radial arterial line  RIJ mac w/ slick + swan   LIJ quad CVC  [place]  Chest Tubes: 2 Mediastinal  LVAD     Dispo/Code Status/Palliative:     - Continue SICU Care    - Full Code         Elizabeth Key MD  Critical Care - Surgery  [facility] - [facility] Int[facility]

## 2023-12-03 NOTE — OP NOTE
Date of service:  12/01/2023    Preoperative diagnosis:   1.  Acute on chronic systolic and diastolic heart failure, NYHA Class IV, INTERMACS 2  2.  Cardiogenic shock  3. Status post intra-aortic balloon pump   4. Ventricular tachycardia  5. Type 2 diabetes mellitus  6. Acute kidney injury  7. Aortic insufficiency  8. Severe mitral regurgitation  9. Paroxysmal atrial fibrillation  10. Status post coronary artery bypass grafting in 2009       Postop diagnosis:  Same    Operation:  1. Redo sternotomy   2. Extensive lysis of adhesion which increased the complexity of the operation due to dense calcific addition increasing operative time by more than 1-1/2 hours  3. Mitral valve repair-Edge to edge repair-through the LV apex using Noble stitch   4. Aortic valve repair  5.  Implantation of left ventricular assist device-HeartMate 3  6. Cutdown of the right femoral artery  7. Removal of intra-aortic balloon pump from the right femoral artery .   8. Primary repair of the right femoral artery   9. Temporary closure of the chest    Staff surgeon:  Yuri Washington  First assistant:  Reyna Shaffer  Anesthesia:  GTA  Estimated blood loss: 500 ml     Key findings of the operation:  1.  Diffuse coagulopathy  2.  Moderate to severe RV dysfunction   3.  Significant hemoconcentration carried out total of 1000 cc.    4.  Patient significantly fluid overload with a CVP of 15  5. Two lap sponges were left for packing intentionally to help in achieving hemostasis    Indication of operation:  This is a 63 -year-old male who presented with acute on chronic systolic and diastolic heart failure with cardiogenic shock.  Patient was worked up and placed on multiple inotropes and nitric oxide . Patient was worked up and was found to be a candidate for a destination therapy LVAD.  Patient was offered different treatment options and after understanding the risks and benefits and treatment options wanted to proceed with implantation of a  HeartMate 3.  An informed consent was obtained.    Operation:  Patient was brought to the operating room and placed in supine position.  After induction of anesthesia area was prepped and draped in the usual sterile fashion.  Due to the redo nature of the surgery from prior CABG, a decision was made to do a cutdown of the right femoral artery and femoral vein for exposure of the femoral vessels in anticipation for initiating cardiopulmonary bypass for placement of LVAD.  Also, we would need to do the cutdown for removal of intra-aortic balloon pump and primary repair of the artery.  Incision made in the right groin was carried all the way down to the femoral artery and femoral vein.  Meticulous dissection of the femoral artery and femoral vein was carried out.  Cannulation stitches were placed around the femoral vein.  After that, 5-0 Prolene suture was placed circumferentially around the insertion site of the intra-aortic balloon sheath.  Once this was achieved then attention was directed to the previously placed median sternotomy healed incision.  Incision was made and carried all the way down to the sternal wires.  Once the sternal wires were dissected out they were cut and left in place.  Using the redo oscillating saw the anterior table of the sternum was cut all the way down to the sternal wires.  Once that was achieved the sternal wires were removed and passed off the field.  Using curved Lorenzo scissors the posterior table of the sternum was then cut.  Meticulous dissection was utilized to dissect the underlying heart from the sternum.  Dense calcific adhesions were noted.  This increased the complexity of the operation by more than 90 minutes.  Using a combination of both electrocautery and sharp dissection the complete heart was dissected out.  Previously placed bypass graft were salvaged.  Once that was done systemic heparinization was carried out.  Once ACT greater than 450 was obtained.  Cannulation  stitches were placed.  Arterial cannula placed in the ascending aorta.  Right atrial appendage was used for placement of venous cannula.  Patient was placed on full cardiopulmonary bypass after an ACT of 450 was achieved.  Carbon dioxide was used to flood the operative field continuously to reduce chances of any air embolism.  Retrograde cardioplegia catheter was placed in the coronary sinus.  Patient was placed on full cardiopulmonary bypass.  Cross-clamp was then applied.  A low aortotomy was made.  Using a handheld antegrade cardioplegia catheter to the coronary ostia and retrograde cardioplegia through the coronary sinus a cardiac standstill was obtained in diastole after administering 1600 cc of cardioplegia.  Once that was achieved attention was directed towards the aortic valve.  The aortic valve was found to be prolapsing through the non coronary cusp.  Aortic valve repair was then carried out.  This repair was carried out using 5-0 Prolene pledgetted suture.  The suture was passed through each node of Arantus of each leaflet.  The suture was then tied down.  The valve was then tested.  No aortic insufficiency or prolapse of the leaflets were noted.  Once that was done the aortic closure was carried out using 4-0 Prolene suture in 2 layers followed by application of a thin layer of bio glue to aid in hemostasis.  Once this was all achieved a hotshot was then delivered over 3 minutes and the cross-clamp was removed.  Instant cardiac activity was then noted.      Heart was brought to the field by placing multiple lap sponges behind the heart.  True LV apex was identified on the examination in multiple views.  LV apex was cored and submitted for pathology.  Multiple 2-0 Ethibond pledgetted stitches were placed circumferentially.  This helped up in opening of the LV apex.  Malleable retractors were used for visualization of the mitral valve.  Subvalvular apparatus of the mitral valve appeared to be intact.   Posterior leaflet was found to be significantly tethered.  This was resulting in moderate to severe mitral regurgitation.  Analysis of A2 and P2 segment of the mitral valve was carried out.  Both these segments was found to be of good quality.  A decision was made to carry out a patient with repair of the mitral valve.  Using a 5-0 pledgetted Ethibond stitch A2 and P2 segments of the mitral valve were repaired.  Sutures were tied down.  Valve was then tested.  Significant reduction in mitral regurgitation was noted.    Once this was done attention was then directed towards the LV apex.  All the sutures were then passed through the LV apex was now passed through the sewing ring.  Needles were cut and passed off the field and sutures were tied down.  Once that was done LVAD was brought to the field it was lowered into the sewing ring and secured with locking mechanism.  The LVAD was lowered back into the chest cavity after removing all the lap sponges and the LVAD pump positioned in the preperitoneal space.  Using a tunneling device the driveline was pulled out through the exit site and the level of the umbilicus in the right midclavicular line.  Outflow graft was brought to the field and measured for appropriate length.  It was beveled 45 degree angle.  Side-biting clamp was applied on the ascending aorta.  Aortotomy was then carried out.  Using a 5-0 Prolene suture continuous running anastomosis was performed of the outflow graft to the ascending aorta.  Side-biting clamp was then removed.  De-airing of the outflow graft was carried out.  Wet to wet connection of the outflow graft to the LVAD was carried out.  Aortic root vent was placed in the ascending aorta.  The driveline was then connected to the console and started at 3000 RPMs.  Ventilation was resumed.  Electrolytes was found to be within normal limits.  On inotropics support patient was weaned off cardiopulmonary bypass after ensuring absence of any  intracardiac air on MOY examination.  With volume loading the speed of the pump was gradually increased from 3000 RPMs all the way to 4800 RPMs.  Patient's RV function was moderately depressed and needed high inotropic support along with the high doses of nitric oxide.  Patient was also found to be vaso plegic requiring high doses of vasopressors along with initiation of giapreza.  After initiation of Giapreza, pressor requirements was improved.  At this stage decision was made to leave the chest open due to ,RV diffuse coagulopathy and vasoplegia.  Two lap sponges were intentionally used to pack the mediastinum to decrease bleeding and aid coagulation.  Multiple blood products were transfused to correct coagulopathy as guided by TEG.    Test dose of protamine was given followed by full dose of protamine to reverse the effects of systemic heparin.  Two drainage tubes were placed and brought through separate skin incision and connected to Pleur-Evac.  Temporary closure of the chest was carried out with Esmarch and Ioban to obtain an airtight seal.    Terminal count of needles and instrument was found to be correct.  Diverling exit site was sutured with 2-0 Ethibond stitch.  Patient was taken back to the intensive care unit in a stable condition.  Member of the Cardiothoracic surgery team along with cardiac anesthesia escorted the patient to the surgical ICU where a detailed transition of the patient care was provided to the ICU team.  I was personally present and talked to the ICU attending who was present at the bedside along with the nurses and the house staff.  Goals of care were discussed and treatment plan outlined.    Medicare attestation:  Due to unavailability of an adequately trained cardiothoracic surgery resident I performed all parts of the operation myself.

## 2023-12-03 NOTE — SUBJECTIVE & OBJECTIVE
Interval History/Significant Events: NAEO. 0.08 epi, 5 , Nitric weaned to 7.5. Lasix up to 15 and given diuril with good UOP response. Flows and PI stable.     Follow-up For: Procedure(s) (LRB):  INSERTION-LEFT VENTRICULAR ASSIST DEVICE (Left)  STERNOTOMY, REDO (N/A)  VALVULOPLASTY, MITRAL VALVE (N/A)  LYSIS, ADHESIONS  REPAIR, AORTIC VALVE (N/A)  CLOSURE, TEMPORARY (N/A)  ANGIOPLASTY-VENOUS ARTERY, RIGHT FEMORAL (Right)  REPAIR, ANEURYSM, ARTERY, FEMORAL (Right)    Post-Operative Day: 1 Day Post-Op    Objective:     Vital Signs (Most Recent):  Temp: 99.7 °F (37.6 °C) (12/03/23 0301)  Pulse: 92 (12/03/23 0600)  Resp: 16 (12/03/23 0600)  BP: (!) 84/0 (12/03/23 0331)  SpO2: 100 % (12/03/23 0600) Vital Signs (24h Range):  Temp:  [98.7 °F (37.1 °C)-99.7 °F (37.6 °C)] 99.7 °F (37.6 °C)  Pulse:  [84-99] 92  Resp:  [6-26] 16  SpO2:  [96 %-100 %] 100 %  BP: (82-88)/(0) 84/0  Arterial Line BP: (79-93)/(52-73) 84/66     Weight:  (Bed scale will not turn on)  Body mass index is 23.17 kg/m².      Intake/Output Summary (Last 24 hours) at 12/3/2023 0601  Last data filed at 12/3/2023 0600  Gross per 24 hour   Intake 2585.76 ml   Output 3659 ml   Net -1073.24 ml            Physical Exam  Vitals and nursing note reviewed.   Constitutional:       Comments: sedated   Cardiovascular:      Rate and Rhythm: Normal rate and regular rhythm.      Comments: Open chest, small amount of blood pooling at top and bottom of incision, stable   SS, minimal output  Doppler-able PT and DP bilaterally. More diminished on the R leg  Pulmonary:      Comments: Intubated, minimal settings   Abdominal:      General: There is no distension.      Palpations: Abdomen is soft.   Genitourinary:     Comments: goodman  Musculoskeletal:      Right lower leg: No edema.      Left lower leg: No edema.   Skin:     General: Skin is warm and dry.   Neurological:      Comments: Sedated, moves all extremities             Vents:  Vent Mode: A/C (12/03/23  0405)  Ventilator Initiated: Yes (12/01/23 1529)  Set Rate: 16 BPM (12/03/23 0405)  Vt Set: 490 mL (12/03/23 0405)  PEEP/CPAP: 5 cmH20 (12/03/23 0405)  Oxygen Concentration (%): 40 (12/03/23 0600)  Peak Airway Pressure: 23 cmH20 (12/03/23 0405)  Plateau Pressure: 18 cmH20 (12/03/23 0405)  Total Ve: 7.77 L/m (12/03/23 0405)  Negative Inspiratory Force (cm H2O): 0 (12/03/23 0405)  F/VT Ratio<105 (RSBI): (!) 33.4 (12/03/23 0405)    Lines/Drains/Airways       Central Venous Catheter Line  Duration              Introducer with Double Lumen 12/01/23 1554 Internal Jugular Right 1 day    Pulmonary Artery Catheter Assessment  12/01/23 1556 Internal Jugular Right 1 day    Trialysis (Dialysis) Catheter 12/01/23 1530 left internal jugular 1 day              Drain  Duration                  Chest Tube Tube - 1 Right Mediastinal -- days         Chest Tube 12/01/23 Tube - 2 Left Mediastinal 2 days         NG/OG Tube 12/01/23 1545 orogastric Center mouth 1 day         Urethral Catheter 12/01/23 0754 Temperature probe;Silicone;Non-latex 14 Fr. 1 day              Airway  Duration                  Airway - Non-Surgical 12/01/23 1534 Endotracheal Tube 1 day              Arterial Line  Duration             Arterial Line 12/01/23 1500 Left Radial 1 day              Line  Duration                  VAD 12/01/23 1015 Left ventricular assist device HeartMate 3 1 day              Peripheral Intravenous Line  Duration                  Peripheral IV - Single Lumen 11/29/23 1100 20 G Left Forearm 3 days         Peripheral IV - Single Lumen 12/01/23 2227 20 G Right Hand 1 day                    Significant Labs:    CBC/Anemia Profile:  Recent Labs   Lab 12/02/23 1938 12/02/23 2358 12/03/23  0359   WBC 13.75* 14.82* 16.50*   HGB 7.8* 7.8* 7.8*   HCT 21.9* 22.0* 22.9*    143* 157   MCV 82 83 86   RDW 16.3* 16.3* 16.5*          Chemistries:  Recent Labs   Lab 12/02/23 1938 12/02/23 2358 12/03/23  0359    140 139   K 4.6 4.4 4.9   CL  108 108 106   CO2 21* 21* 20*   BUN 46* 48* 50*   CREATININE 2.3* 2.4* 2.5*   CALCIUM 8.3* 8.5* 8.2*   ALBUMIN 2.6* 2.6* 2.6*   PROT 5.7* 5.8* 5.2*   BILITOT 0.8 0.7 0.8   ALKPHOS 62 63 68   ALT 35 39 38   AST 81* 74* 69*   MG 2.5 2.4 2.4   PHOS 5.2* 5.4* 6.4*         All pertinent labs within the past 24 hours have been reviewed.    Significant Imaging:  I have reviewed all pertinent imaging results/findings within the past 24 hours.

## 2023-12-04 ENCOUNTER — ANESTHESIA (OUTPATIENT)
Dept: SURGERY | Facility: HOSPITAL | Age: 63
DRG: 001 | End: 2023-12-04
Payer: MEDICAID

## 2023-12-04 ENCOUNTER — DOCUMENTATION ONLY (OUTPATIENT)
Dept: CARDIOLOGY | Facility: HOSPITAL | Age: 63
End: 2023-12-04
Payer: MEDICAID

## 2023-12-04 LAB
ABO + RH BLD: NORMAL
ALBUMIN SERPL BCP-MCNC: 2.1 G/DL (ref 3.5–5.2)
ALBUMIN SERPL BCP-MCNC: 2.2 G/DL (ref 3.5–5.2)
ALBUMIN SERPL BCP-MCNC: 2.2 G/DL (ref 3.5–5.2)
ALBUMIN SERPL BCP-MCNC: 2.3 G/DL (ref 3.5–5.2)
ALBUMIN SERPL BCP-MCNC: 2.4 G/DL (ref 3.5–5.2)
ALLENS TEST: ABNORMAL
ALLENS TEST: NORMAL
ALP SERPL-CCNC: 77 U/L (ref 55–135)
ALP SERPL-CCNC: 79 U/L (ref 55–135)
ALP SERPL-CCNC: 79 U/L (ref 55–135)
ALP SERPL-CCNC: 87 U/L (ref 55–135)
ALP SERPL-CCNC: 91 U/L (ref 55–135)
ALT SERPL W/O P-5'-P-CCNC: 30 U/L (ref 10–44)
ALT SERPL W/O P-5'-P-CCNC: 32 U/L (ref 10–44)
ALT SERPL W/O P-5'-P-CCNC: 33 U/L (ref 10–44)
ALT SERPL W/O P-5'-P-CCNC: 35 U/L (ref 10–44)
ALT SERPL W/O P-5'-P-CCNC: 35 U/L (ref 10–44)
ANION GAP SERPL CALC-SCNC: 12 MMOL/L (ref 8–16)
ANION GAP SERPL CALC-SCNC: 13 MMOL/L (ref 8–16)
ANION GAP SERPL CALC-SCNC: 14 MMOL/L (ref 8–16)
ANISOCYTOSIS BLD QL SMEAR: SLIGHT
APTT PPP: 25.5 SEC (ref 21–32)
APTT PPP: 25.8 SEC (ref 21–32)
APTT PPP: 26.2 SEC (ref 21–32)
APTT PPP: 26.5 SEC (ref 21–32)
AST SERPL-CCNC: 36 U/L (ref 10–40)
AST SERPL-CCNC: 36 U/L (ref 10–40)
AST SERPL-CCNC: 38 U/L (ref 10–40)
AST SERPL-CCNC: 39 U/L (ref 10–40)
AST SERPL-CCNC: 43 U/L (ref 10–40)
BASOPHILS # BLD AUTO: 0.04 K/UL (ref 0–0.2)
BASOPHILS NFR BLD: 0.3 % (ref 0–1.9)
BILIRUB DIRECT SERPL-MCNC: 0.3 MG/DL (ref 0.1–0.3)
BILIRUB SERPL-MCNC: 0.5 MG/DL (ref 0.1–1)
BILIRUB SERPL-MCNC: 0.6 MG/DL (ref 0.1–1)
BILIRUB SERPL-MCNC: 0.7 MG/DL (ref 0.1–1)
BIPAP: 0
BLD GP AB SCN CELLS X3 SERPL QL: NORMAL
BNP SERPL-MCNC: 285 PG/ML (ref 0–99)
BUN SERPL-MCNC: 58 MG/DL (ref 8–23)
BUN SERPL-MCNC: 59 MG/DL (ref 8–23)
BUN SERPL-MCNC: 59 MG/DL (ref 8–23)
BUN SERPL-MCNC: 61 MG/DL (ref 8–23)
BUN SERPL-MCNC: 61 MG/DL (ref 8–23)
CALCIUM SERPL-MCNC: 8.3 MG/DL (ref 8.7–10.5)
CALCIUM SERPL-MCNC: 8.4 MG/DL (ref 8.7–10.5)
CALCIUM SERPL-MCNC: 8.6 MG/DL (ref 8.7–10.5)
CALCIUM SERPL-MCNC: 8.7 MG/DL (ref 8.7–10.5)
CALCIUM SERPL-MCNC: 8.7 MG/DL (ref 8.7–10.5)
CHLORIDE SERPL-SCNC: 103 MMOL/L (ref 95–110)
CHLORIDE SERPL-SCNC: 104 MMOL/L (ref 95–110)
CHLORIDE SERPL-SCNC: 104 MMOL/L (ref 95–110)
CO2 SERPL-SCNC: 18 MMOL/L (ref 23–29)
CO2 SERPL-SCNC: 19 MMOL/L (ref 23–29)
CO2 SERPL-SCNC: 20 MMOL/L (ref 23–29)
CREAT SERPL-MCNC: 2.6 MG/DL (ref 0.5–1.4)
CREAT SERPL-MCNC: 2.7 MG/DL (ref 0.5–1.4)
CREAT SERPL-MCNC: 2.7 MG/DL (ref 0.5–1.4)
CREAT SERPL-MCNC: 2.8 MG/DL (ref 0.5–1.4)
CREAT SERPL-MCNC: 2.9 MG/DL (ref 0.5–1.4)
CRP SERPL-MCNC: 400.8 MG/L (ref 0–8.2)
DELSYS: ABNORMAL
DIFFERENTIAL METHOD BLD: ABNORMAL
EOSINOPHIL # BLD AUTO: 0 K/UL (ref 0–0.5)
EOSINOPHIL NFR BLD: 0.2 % (ref 0–8)
EOSINOPHIL NFR BLD: 0.3 % (ref 0–8)
ERYTHROCYTE [DISTWIDTH] IN BLOOD BY AUTOMATED COUNT: 15.4 % (ref 11.5–14.5)
ERYTHROCYTE [DISTWIDTH] IN BLOOD BY AUTOMATED COUNT: 15.9 % (ref 11.5–14.5)
ERYTHROCYTE [DISTWIDTH] IN BLOOD BY AUTOMATED COUNT: 15.9 % (ref 11.5–14.5)
ERYTHROCYTE [DISTWIDTH] IN BLOOD BY AUTOMATED COUNT: 16.1 % (ref 11.5–14.5)
ERYTHROCYTE [SEDIMENTATION RATE] IN BLOOD BY WESTERGREN METHOD: 16 MM/H
ERYTHROCYTE [SEDIMENTATION RATE] IN BLOOD BY WESTERGREN METHOD: 18 MM/H
EST. GFR  (NO RACE VARIABLE): 23.9 ML/MIN/1.73 M^2
EST. GFR  (NO RACE VARIABLE): 24.9 ML/MIN/1.73 M^2
EST. GFR  (NO RACE VARIABLE): 26 ML/MIN/1.73 M^2
EST. GFR  (NO RACE VARIABLE): 26 ML/MIN/1.73 M^2
EST. GFR  (NO RACE VARIABLE): 27.2 ML/MIN/1.73 M^2
FIBRINOGEN PPP-MCNC: >800 MG/DL (ref 182–400)
FIO2: 40
FIO2: 40 %
GLUCOSE SERPL-MCNC: 100 MG/DL (ref 70–110)
GLUCOSE SERPL-MCNC: 120 MG/DL (ref 70–110)
GLUCOSE SERPL-MCNC: 133 MG/DL (ref 70–110)
GLUCOSE SERPL-MCNC: 133 MG/DL (ref 70–110)
GLUCOSE SERPL-MCNC: 98 MG/DL (ref 70–110)
HCO3 UR-SCNC: 19.1 MMOL/L (ref 24–28)
HCO3 UR-SCNC: 19.3 MMOL/L (ref 24–28)
HCO3 UR-SCNC: 20.5 MMOL/L (ref 24–28)
HCO3 UR-SCNC: 20.6 MMOL/L (ref 24–28)
HCO3 UR-SCNC: 22.2 MMOL/L (ref 24–28)
HCO3 UR-SCNC: 22.3 MMOL/L (ref 24–28)
HCT VFR BLD AUTO: 21.3 % (ref 40–54)
HCT VFR BLD AUTO: 21.4 % (ref 40–54)
HCT VFR BLD AUTO: 21.8 % (ref 40–54)
HCT VFR BLD AUTO: 24.4 % (ref 40–54)
HCT VFR BLD CALC: 22 %PCV (ref 36–54)
HCT VFR BLD CALC: 23 %PCV (ref 36–54)
HGB BLD-MCNC: 7.3 G/DL (ref 14–18)
HGB BLD-MCNC: 7.4 G/DL (ref 14–18)
HGB BLD-MCNC: 7.5 G/DL (ref 14–18)
HGB BLD-MCNC: 8.5 G/DL (ref 14–18)
HYPOCHROMIA BLD QL SMEAR: ABNORMAL
IMM GRANULOCYTES # BLD AUTO: 0.12 K/UL (ref 0–0.04)
IMM GRANULOCYTES # BLD AUTO: 0.16 K/UL (ref 0–0.04)
IMM GRANULOCYTES NFR BLD AUTO: 0.8 % (ref 0–0.5)
IMM GRANULOCYTES NFR BLD AUTO: 0.8 % (ref 0–0.5)
IMM GRANULOCYTES NFR BLD AUTO: 0.9 % (ref 0–0.5)
IMM GRANULOCYTES NFR BLD AUTO: 1.1 % (ref 0–0.5)
INR PPP: 1 (ref 0.8–1.2)
LDH SERPL L TO P-CCNC: 0.33 MMOL/L (ref 0.36–1.25)
LDH SERPL L TO P-CCNC: 0.56 MMOL/L (ref 0.36–1.25)
LDH SERPL L TO P-CCNC: 3.56 MMOL/L (ref 0.36–1.25)
LDH SERPL L TO P-CCNC: 528 U/L (ref 110–260)
LYMPHOCYTES # BLD AUTO: 0.3 K/UL (ref 1–4.8)
LYMPHOCYTES # BLD AUTO: 0.4 K/UL (ref 1–4.8)
LYMPHOCYTES # BLD AUTO: 0.4 K/UL (ref 1–4.8)
LYMPHOCYTES # BLD AUTO: 0.5 K/UL (ref 1–4.8)
LYMPHOCYTES NFR BLD: 2.3 % (ref 18–48)
LYMPHOCYTES NFR BLD: 2.3 % (ref 18–48)
LYMPHOCYTES NFR BLD: 2.8 % (ref 18–48)
LYMPHOCYTES NFR BLD: 3.7 % (ref 18–48)
MAGNESIUM SERPL-MCNC: 2.4 MG/DL (ref 1.6–2.6)
MCH RBC QN AUTO: 28.8 PG (ref 27–31)
MCH RBC QN AUTO: 29 PG (ref 27–31)
MCH RBC QN AUTO: 29.4 PG (ref 27–31)
MCH RBC QN AUTO: 29.8 PG (ref 27–31)
MCHC RBC AUTO-ENTMCNC: 33.9 G/DL (ref 32–36)
MCHC RBC AUTO-ENTMCNC: 34.3 G/DL (ref 32–36)
MCHC RBC AUTO-ENTMCNC: 34.8 G/DL (ref 32–36)
MCHC RBC AUTO-ENTMCNC: 35 G/DL (ref 32–36)
MCV RBC AUTO: 82 FL (ref 82–98)
MCV RBC AUTO: 86 FL (ref 82–98)
METHEMOGLOBIN: 1.2 % (ref 0–3)
MIN VOL: 14
MIN VOL: 7.68
MIN VOL: 7.71
MIN VOL: 9
MIN VOL: 9.21
MODE: ABNORMAL
MONOCYTES # BLD AUTO: 0.7 K/UL (ref 0.3–1)
MONOCYTES # BLD AUTO: 0.8 K/UL (ref 0.3–1)
MONOCYTES NFR BLD: 4.2 % (ref 4–15)
MONOCYTES NFR BLD: 4.9 % (ref 4–15)
MONOCYTES NFR BLD: 5 % (ref 4–15)
MONOCYTES NFR BLD: 5 % (ref 4–15)
NEUTROPHILS # BLD AUTO: 12.7 K/UL (ref 1.8–7.7)
NEUTROPHILS # BLD AUTO: 13.1 K/UL (ref 1.8–7.7)
NEUTROPHILS # BLD AUTO: 14.2 K/UL (ref 1.8–7.7)
NEUTROPHILS # BLD AUTO: 14.3 K/UL (ref 1.8–7.7)
NEUTROPHILS NFR BLD: 89.8 % (ref 38–73)
NEUTROPHILS NFR BLD: 91.2 % (ref 38–73)
NEUTROPHILS NFR BLD: 91.4 % (ref 38–73)
NEUTROPHILS NFR BLD: 91.7 % (ref 38–73)
NRBC BLD-RTO: 0 /100 WBC
OVALOCYTES BLD QL SMEAR: ABNORMAL
PCO2 BLDA: 27.4 MMHG (ref 35–45)
PCO2 BLDA: 28 MMHG (ref 35–45)
PCO2 BLDA: 29.3 MMHG (ref 35–45)
PCO2 BLDA: 29.4 MMHG (ref 35–45)
PCO2 BLDA: 31.8 MMHG (ref 35–45)
PCO2 BLDA: 33.8 MMHG (ref 35–45)
PEEP: 5
PH SMN: 7.39 [PH] (ref 7.35–7.45)
PH SMN: 7.45 [PH] (ref 7.35–7.45)
PH SMN: 7.46 [PH] (ref 7.35–7.45)
PH SMN: 7.49 [PH] (ref 7.35–7.45)
PHOSPHATE SERPL-MCNC: 7 MG/DL (ref 2.7–4.5)
PHOSPHATE SERPL-MCNC: 7 MG/DL (ref 2.7–4.5)
PHOSPHATE SERPL-MCNC: 7.1 MG/DL (ref 2.7–4.5)
PHOSPHATE SERPL-MCNC: 7.3 MG/DL (ref 2.7–4.5)
PHOSPHATE SERPL-MCNC: 7.4 MG/DL (ref 2.7–4.5)
PIP: 19
PIP: 22
PIP: 25
PIP: 28
PIP: 28
PLATELET # BLD AUTO: 172 K/UL (ref 150–450)
PLATELET # BLD AUTO: 175 K/UL (ref 150–450)
PLATELET # BLD AUTO: 184 K/UL (ref 150–450)
PLATELET # BLD AUTO: 196 K/UL (ref 150–450)
PLATELET BLD QL SMEAR: ABNORMAL
PMV BLD AUTO: 10.6 FL (ref 9.2–12.9)
PMV BLD AUTO: 10.9 FL (ref 9.2–12.9)
PMV BLD AUTO: 10.9 FL (ref 9.2–12.9)
PMV BLD AUTO: 11.1 FL (ref 9.2–12.9)
PO2 BLDA: 128 MMHG (ref 80–100)
PO2 BLDA: 143 MMHG (ref 80–100)
PO2 BLDA: 145 MMHG (ref 80–100)
PO2 BLDA: 148 MMHG (ref 80–100)
PO2 BLDA: 159 MMHG (ref 80–100)
PO2 BLDA: 27 MMHG (ref 40–60)
PO2 BLDA: 31 MMHG (ref 40–60)
POC BE: -1 MMOL/L
POC BE: -2 MMOL/L
POC BE: -3 MMOL/L
POC BE: -4 MMOL/L
POC BE: -5 MMOL/L
POC BE: -5 MMOL/L
POC IONIZED CALCIUM: 1.06 MMOL/L (ref 1.06–1.42)
POC IONIZED CALCIUM: 1.09 MMOL/L (ref 1.06–1.42)
POC METHB: 1.2 % (ref 0–3)
POC PERFORMED BY: NORMAL
POC SATURATED O2: 100 % (ref 95–100)
POC SATURATED O2: 51 % (ref 95–100)
POC SATURATED O2: 61 % (ref 95–100)
POC SATURATED O2: 99 % (ref 95–100)
POC SVO2: 60 %
POC TCO2: 20 MMOL/L (ref 23–27)
POC TCO2: 20 MMOL/L (ref 23–27)
POC TCO2: 21 MMOL/L (ref 23–27)
POC TCO2: 22 MMOL/L (ref 24–29)
POC TCO2: 23 MMOL/L (ref 23–27)
POC TCO2: 23 MMOL/L (ref 23–27)
POC TEMPERATURE: 37 C
POCT GLUCOSE: 101 MG/DL (ref 70–110)
POCT GLUCOSE: 105 MG/DL (ref 70–110)
POCT GLUCOSE: 129 MG/DL (ref 70–110)
POCT GLUCOSE: 134 MG/DL (ref 70–110)
POCT GLUCOSE: 142 MG/DL (ref 70–110)
POCT GLUCOSE: 172 MG/DL (ref 70–110)
POIKILOCYTOSIS BLD QL SMEAR: SLIGHT
POLYCHROMASIA BLD QL SMEAR: ABNORMAL
POTASSIUM BLD-SCNC: 3.9 MMOL/L (ref 3.5–5.1)
POTASSIUM BLD-SCNC: 5.1 MMOL/L (ref 3.5–5.1)
POTASSIUM SERPL-SCNC: 3.8 MMOL/L (ref 3.5–5.1)
POTASSIUM SERPL-SCNC: 3.9 MMOL/L (ref 3.5–5.1)
POTASSIUM SERPL-SCNC: 3.9 MMOL/L (ref 3.5–5.1)
PROT SERPL-MCNC: 5.5 G/DL (ref 6–8.4)
PROT SERPL-MCNC: 6 G/DL (ref 6–8.4)
PROT SERPL-MCNC: 6.1 G/DL (ref 6–8.4)
PROT SERPL-MCNC: 6.1 G/DL (ref 6–8.4)
PROT SERPL-MCNC: 6.2 G/DL (ref 6–8.4)
PROTHROMBIN TIME: 10.9 SEC (ref 9–12.5)
PROTHROMBIN TIME: 11 SEC (ref 9–12.5)
RBC # BLD AUTO: 2.48 M/UL (ref 4.6–6.2)
RBC # BLD AUTO: 2.55 M/UL (ref 4.6–6.2)
RBC # BLD AUTO: 2.6 M/UL (ref 4.6–6.2)
RBC # BLD AUTO: 2.85 M/UL (ref 4.6–6.2)
SAMPLE: ABNORMAL
SAMPLE: NORMAL
SITE: ABNORMAL
SITE: NORMAL
SODIUM BLD-SCNC: 135 MMOL/L (ref 136–145)
SODIUM BLD-SCNC: 139 MMOL/L (ref 136–145)
SODIUM SERPL-SCNC: 135 MMOL/L (ref 136–145)
SODIUM SERPL-SCNC: 136 MMOL/L (ref 136–145)
SODIUM SERPL-SCNC: 138 MMOL/L (ref 136–145)
SP02: 100
SP02: 99
SPECIMEN OUTDATE: NORMAL
SPECIMEN SOURCE: NORMAL
SPHEROCYTES BLD QL SMEAR: ABNORMAL
VT: 490
WBC # BLD AUTO: 13.87 K/UL (ref 3.9–12.7)
WBC # BLD AUTO: 14.53 K/UL (ref 3.9–12.7)
WBC # BLD AUTO: 15.5 K/UL (ref 3.9–12.7)
WBC # BLD AUTO: 15.56 K/UL (ref 3.9–12.7)

## 2023-12-04 PROCEDURE — 25000003 PHARM REV CODE 250

## 2023-12-04 PROCEDURE — 84100 ASSAY OF PHOSPHORUS: CPT | Performed by: THORACIC SURGERY (CARDIOTHORACIC VASCULAR SURGERY)

## 2023-12-04 PROCEDURE — 80053 COMPREHEN METABOLIC PANEL: CPT | Performed by: THORACIC SURGERY (CARDIOTHORACIC VASCULAR SURGERY)

## 2023-12-04 PROCEDURE — 85384 FIBRINOGEN ACTIVITY: CPT | Performed by: THORACIC SURGERY (CARDIOTHORACIC VASCULAR SURGERY)

## 2023-12-04 PROCEDURE — 27000248 HC VAD-ADDITIONAL DAY

## 2023-12-04 PROCEDURE — 84295 ASSAY OF SERUM SODIUM: CPT

## 2023-12-04 PROCEDURE — 93325 PR DOPPLER COLOR FLOW VELOCITY MAP: ICD-10-PCS | Mod: 26,,, | Performed by: ANESTHESIOLOGY

## 2023-12-04 PROCEDURE — 83735 ASSAY OF MAGNESIUM: CPT | Performed by: THORACIC SURGERY (CARDIOTHORACIC VASCULAR SURGERY)

## 2023-12-04 PROCEDURE — C9113 INJ PANTOPRAZOLE SODIUM, VIA: HCPCS

## 2023-12-04 PROCEDURE — 37799 UNLISTED PX VASCULAR SURGERY: CPT

## 2023-12-04 PROCEDURE — 63600367 HC NITRIC OXIDE PER HOUR

## 2023-12-04 PROCEDURE — 82803 BLOOD GASES ANY COMBINATION: CPT

## 2023-12-04 PROCEDURE — 63600150 PHARM REV CODE 636

## 2023-12-04 PROCEDURE — 94761 N-INVAS EAR/PLS OXIMETRY MLT: CPT | Mod: XB

## 2023-12-04 PROCEDURE — 88300 SURGICAL PATH GROSS: CPT | Performed by: PATHOLOGY

## 2023-12-04 PROCEDURE — 80076 HEPATIC FUNCTION PANEL: CPT | Performed by: STUDENT IN AN ORGANIZED HEALTH CARE EDUCATION/TRAINING PROGRAM

## 2023-12-04 PROCEDURE — 63600175 PHARM REV CODE 636 W HCPCS

## 2023-12-04 PROCEDURE — 27201423 OPTIME MED/SURG SUP & DEVICES STERILE SUPPLY: Performed by: THORACIC SURGERY (CARDIOTHORACIC VASCULAR SURGERY)

## 2023-12-04 PROCEDURE — 93750 INTERROGATION VAD IN PERSON: CPT | Mod: ,,, | Performed by: INTERNAL MEDICINE

## 2023-12-04 PROCEDURE — 37000008 HC ANESTHESIA 1ST 15 MINUTES: Performed by: THORACIC SURGERY (CARDIOTHORACIC VASCULAR SURGERY)

## 2023-12-04 PROCEDURE — 82330 ASSAY OF CALCIUM: CPT

## 2023-12-04 PROCEDURE — 93312 ECHO TRANSESOPHAGEAL: CPT | Mod: 26,59,, | Performed by: ANESTHESIOLOGY

## 2023-12-04 PROCEDURE — 25000003 PHARM REV CODE 250: Performed by: STUDENT IN AN ORGANIZED HEALTH CARE EDUCATION/TRAINING PROGRAM

## 2023-12-04 PROCEDURE — 86140 C-REACTIVE PROTEIN: CPT | Performed by: STUDENT IN AN ORGANIZED HEALTH CARE EDUCATION/TRAINING PROGRAM

## 2023-12-04 PROCEDURE — 82800 BLOOD PH: CPT

## 2023-12-04 PROCEDURE — 83050 HGB METHEMOGLOBIN QUAN: CPT

## 2023-12-04 PROCEDURE — 99900035 HC TECH TIME PER 15 MIN (STAT)

## 2023-12-04 PROCEDURE — 25000003 PHARM REV CODE 250: Performed by: THORACIC SURGERY (CARDIOTHORACIC VASCULAR SURGERY)

## 2023-12-04 PROCEDURE — 63600175 PHARM REV CODE 636 W HCPCS: Performed by: THORACIC SURGERY (CARDIOTHORACIC VASCULAR SURGERY)

## 2023-12-04 PROCEDURE — 25000003 PHARM REV CODE 250: Performed by: INTERNAL MEDICINE

## 2023-12-04 PROCEDURE — 20000000 HC ICU ROOM

## 2023-12-04 PROCEDURE — 83880 ASSAY OF NATRIURETIC PEPTIDE: CPT | Performed by: STUDENT IN AN ORGANIZED HEALTH CARE EDUCATION/TRAINING PROGRAM

## 2023-12-04 PROCEDURE — 80053 COMPREHEN METABOLIC PANEL: CPT | Mod: 91 | Performed by: THORACIC SURGERY (CARDIOTHORACIC VASCULAR SURGERY)

## 2023-12-04 PROCEDURE — 85610 PROTHROMBIN TIME: CPT | Mod: 91 | Performed by: THORACIC SURGERY (CARDIOTHORACIC VASCULAR SURGERY)

## 2023-12-04 PROCEDURE — 36000712 HC OR TIME LEV V 1ST 15 MIN: Performed by: THORACIC SURGERY (CARDIOTHORACIC VASCULAR SURGERY)

## 2023-12-04 PROCEDURE — C1729 CATH, DRAINAGE: HCPCS | Performed by: THORACIC SURGERY (CARDIOTHORACIC VASCULAR SURGERY)

## 2023-12-04 PROCEDURE — 2W14X6Z COMPRESSION OF CHEST WALL USING PRESSURE DRESSING: ICD-10-PCS | Performed by: THORACIC SURGERY (CARDIOTHORACIC VASCULAR SURGERY)

## 2023-12-04 PROCEDURE — 27000239 HC STAND-BY BYPASS PUMP

## 2023-12-04 PROCEDURE — 94003 VENT MGMT INPAT SUBQ DAY: CPT

## 2023-12-04 PROCEDURE — 99900026 HC AIRWAY MAINTENANCE (STAT)

## 2023-12-04 PROCEDURE — 25000003 PHARM REV CODE 250: Performed by: NURSE PRACTITIONER

## 2023-12-04 PROCEDURE — 85025 COMPLETE CBC W/AUTO DIFF WBC: CPT | Performed by: THORACIC SURGERY (CARDIOTHORACIC VASCULAR SURGERY)

## 2023-12-04 PROCEDURE — 85730 THROMBOPLASTIN TIME PARTIAL: CPT | Performed by: THORACIC SURGERY (CARDIOTHORACIC VASCULAR SURGERY)

## 2023-12-04 PROCEDURE — 21750 REPAIR OF STERNUM SEPARATION: CPT | Mod: 58,,, | Performed by: THORACIC SURGERY (CARDIOTHORACIC VASCULAR SURGERY)

## 2023-12-04 PROCEDURE — 85384 FIBRINOGEN ACTIVITY: CPT | Mod: 91 | Performed by: THORACIC SURGERY (CARDIOTHORACIC VASCULAR SURGERY)

## 2023-12-04 PROCEDURE — 93320 PR DOPPLER ECHO HEART,COMPLETE: ICD-10-PCS | Mod: 26,,, | Performed by: ANESTHESIOLOGY

## 2023-12-04 PROCEDURE — 84100 ASSAY OF PHOSPHORUS: CPT | Mod: 91 | Performed by: THORACIC SURGERY (CARDIOTHORACIC VASCULAR SURGERY)

## 2023-12-04 PROCEDURE — 85014 HEMATOCRIT: CPT

## 2023-12-04 PROCEDURE — 99232 SBSQ HOSP IP/OBS MODERATE 35: CPT | Mod: ,,, | Performed by: NURSE PRACTITIONER

## 2023-12-04 PROCEDURE — 83605 ASSAY OF LACTIC ACID: CPT

## 2023-12-04 PROCEDURE — 36430 TRANSFUSION BLD/BLD COMPNT: CPT

## 2023-12-04 PROCEDURE — 93325 DOPPLER ECHO COLOR FLOW MAPG: CPT | Mod: 26,,, | Performed by: ANESTHESIOLOGY

## 2023-12-04 PROCEDURE — 86850 RBC ANTIBODY SCREEN: CPT | Performed by: THORACIC SURGERY (CARDIOTHORACIC VASCULAR SURGERY)

## 2023-12-04 PROCEDURE — 88300 SURGICAL PATH GROSS: CPT | Mod: 26,,, | Performed by: PATHOLOGY

## 2023-12-04 PROCEDURE — 37000009 HC ANESTHESIA EA ADD 15 MINS: Performed by: THORACIC SURGERY (CARDIOTHORACIC VASCULAR SURGERY)

## 2023-12-04 PROCEDURE — 80048 BASIC METABOLIC PNL TOTAL CA: CPT | Mod: XB | Performed by: THORACIC SURGERY (CARDIOTHORACIC VASCULAR SURGERY)

## 2023-12-04 PROCEDURE — D9220A PRA ANESTHESIA: Mod: ,,, | Performed by: ANESTHESIOLOGY

## 2023-12-04 PROCEDURE — 85730 THROMBOPLASTIN TIME PARTIAL: CPT | Mod: 91 | Performed by: THORACIC SURGERY (CARDIOTHORACIC VASCULAR SURGERY)

## 2023-12-04 PROCEDURE — P9016 RBC LEUKOCYTES REDUCED: HCPCS

## 2023-12-04 PROCEDURE — 99291 CRITICAL CARE FIRST HOUR: CPT | Mod: ,,, | Performed by: PHYSICIAN ASSISTANT

## 2023-12-04 PROCEDURE — D9220A PRA ANESTHESIA: ICD-10-PCS | Mod: ,,, | Performed by: ANESTHESIOLOGY

## 2023-12-04 PROCEDURE — 83735 ASSAY OF MAGNESIUM: CPT | Mod: 91 | Performed by: THORACIC SURGERY (CARDIOTHORACIC VASCULAR SURGERY)

## 2023-12-04 PROCEDURE — 99291 CRITICAL CARE FIRST HOUR: CPT | Mod: ,,, | Performed by: ANESTHESIOLOGY

## 2023-12-04 PROCEDURE — 85610 PROTHROMBIN TIME: CPT | Performed by: THORACIC SURGERY (CARDIOTHORACIC VASCULAR SURGERY)

## 2023-12-04 PROCEDURE — 93320 DOPPLER ECHO COMPLETE: CPT | Mod: 26,,, | Performed by: ANESTHESIOLOGY

## 2023-12-04 PROCEDURE — 36000708 HC OR TIME LEV III 1ST 15 MIN: Performed by: THORACIC SURGERY (CARDIOTHORACIC VASCULAR SURGERY)

## 2023-12-04 PROCEDURE — 63600175 PHARM REV CODE 636 W HCPCS: Performed by: ANESTHESIOLOGY

## 2023-12-04 PROCEDURE — 83615 LACTATE (LD) (LDH) ENZYME: CPT | Performed by: STUDENT IN AN ORGANIZED HEALTH CARE EDUCATION/TRAINING PROGRAM

## 2023-12-04 PROCEDURE — 36000709 HC OR TIME LEV III EA ADD 15 MIN: Performed by: THORACIC SURGERY (CARDIOTHORACIC VASCULAR SURGERY)

## 2023-12-04 PROCEDURE — 25000003 PHARM REV CODE 250: Performed by: ANESTHESIOLOGY

## 2023-12-04 PROCEDURE — 27100171 HC OXYGEN HIGH FLOW UP TO 24 HOURS

## 2023-12-04 PROCEDURE — 93312 TEE: ICD-10-PCS | Mod: 26,59,, | Performed by: ANESTHESIOLOGY

## 2023-12-04 PROCEDURE — 0PQ00ZZ REPAIR STERNUM, OPEN APPROACH: ICD-10-PCS | Performed by: THORACIC SURGERY (CARDIOTHORACIC VASCULAR SURGERY)

## 2023-12-04 PROCEDURE — 86920 COMPATIBILITY TEST SPIN: CPT

## 2023-12-04 PROCEDURE — 36000713 HC OR TIME LEV V EA ADD 15 MIN: Performed by: THORACIC SURGERY (CARDIOTHORACIC VASCULAR SURGERY)

## 2023-12-04 PROCEDURE — 84132 ASSAY OF SERUM POTASSIUM: CPT

## 2023-12-04 PROCEDURE — C1768 GRAFT, VASCULAR: HCPCS | Performed by: THORACIC SURGERY (CARDIOTHORACIC VASCULAR SURGERY)

## 2023-12-04 DEVICE — MEMBRANE PERICARDIAL 15X20CM: Type: IMPLANTABLE DEVICE | Site: CHEST  WALL | Status: FUNCTIONAL

## 2023-12-04 RX ORDER — AMIODARONE HYDROCHLORIDE 200 MG/1
400 TABLET ORAL DAILY
Status: DISCONTINUED | OUTPATIENT
Start: 2023-12-05 | End: 2024-01-13

## 2023-12-04 RX ORDER — FENTANYL CITRATE 50 UG/ML
INJECTION, SOLUTION INTRAMUSCULAR; INTRAVENOUS
Status: DISCONTINUED | OUTPATIENT
Start: 2023-12-04 | End: 2023-12-04

## 2023-12-04 RX ORDER — VASOPRESSIN 20 [USP'U]/ML
INJECTION, SOLUTION INTRAMUSCULAR; SUBCUTANEOUS
Status: DISCONTINUED | OUTPATIENT
Start: 2023-12-04 | End: 2023-12-04

## 2023-12-04 RX ORDER — ATORVASTATIN CALCIUM 10 MG/1
40 TABLET, FILM COATED ORAL DAILY
Status: DISCONTINUED | OUTPATIENT
Start: 2023-12-05 | End: 2023-12-17

## 2023-12-04 RX ORDER — FERROUS GLUCONATE 324(37.5)
324 TABLET ORAL
Status: DISCONTINUED | OUTPATIENT
Start: 2023-12-05 | End: 2023-12-10

## 2023-12-04 RX ORDER — AMOXICILLIN 250 MG
1 CAPSULE ORAL 2 TIMES DAILY
Status: DISCONTINUED | OUTPATIENT
Start: 2023-12-04 | End: 2023-12-07

## 2023-12-04 RX ORDER — ACETAMINOPHEN 325 MG/1
650 TABLET ORAL EVERY 6 HOURS PRN
Status: DISCONTINUED | OUTPATIENT
Start: 2023-12-04 | End: 2023-12-20

## 2023-12-04 RX ORDER — MIDAZOLAM HYDROCHLORIDE 1 MG/ML
INJECTION, SOLUTION INTRAMUSCULAR; INTRAVENOUS
Status: DISCONTINUED | OUTPATIENT
Start: 2023-12-04 | End: 2023-12-04

## 2023-12-04 RX ORDER — PANTOPRAZOLE SODIUM 40 MG/10ML
40 INJECTION, POWDER, LYOPHILIZED, FOR SOLUTION INTRAVENOUS 2 TIMES DAILY
Status: DISCONTINUED | OUTPATIENT
Start: 2023-12-04 | End: 2023-12-12

## 2023-12-04 RX ORDER — CEFAZOLIN SODIUM 1 G/3ML
INJECTION, POWDER, FOR SOLUTION INTRAMUSCULAR; INTRAVENOUS
Status: DISCONTINUED | OUTPATIENT
Start: 2023-12-04 | End: 2023-12-04

## 2023-12-04 RX ORDER — ROCURONIUM BROMIDE 10 MG/ML
INJECTION, SOLUTION INTRAVENOUS
Status: DISCONTINUED | OUTPATIENT
Start: 2023-12-04 | End: 2023-12-04

## 2023-12-04 RX ORDER — POLYETHYLENE GLYCOL 3350 17 G/17G
17 POWDER, FOR SOLUTION ORAL DAILY
Status: DISCONTINUED | OUTPATIENT
Start: 2023-12-05 | End: 2023-12-07

## 2023-12-04 RX ADMIN — NICARDIPINE HYDROCHLORIDE 3 MG/HR: 0.2 INJECTION, SOLUTION INTRAVENOUS at 02:12

## 2023-12-04 RX ADMIN — INSULIN HUMAN 1 UNITS/HR: 1 INJECTION, SOLUTION INTRAVENOUS at 12:12

## 2023-12-04 RX ADMIN — SODIUM CHLORIDE 7.5 MG/HR: 9 INJECTION, SOLUTION INTRAVENOUS at 05:12

## 2023-12-04 RX ADMIN — NICARDIPINE HYDROCHLORIDE 15 MG/HR: 0.2 INJECTION, SOLUTION INTRAVENOUS at 08:12

## 2023-12-04 RX ADMIN — MUPIROCIN: 20 OINTMENT TOPICAL at 08:12

## 2023-12-04 RX ADMIN — FENTANYL CITRATE 100 MCG: 50 INJECTION, SOLUTION INTRAMUSCULAR; INTRAVENOUS at 02:12

## 2023-12-04 RX ADMIN — INSULIN HUMAN 1 UNITS/HR: 1 INJECTION, SOLUTION INTRAVENOUS at 07:12

## 2023-12-04 RX ADMIN — CEFEPIME 1 G: 1 INJECTION, POWDER, FOR SOLUTION INTRAMUSCULAR; INTRAVENOUS at 03:12

## 2023-12-04 RX ADMIN — SODIUM CHLORIDE 7.5 MG/HR: 9 INJECTION, SOLUTION INTRAVENOUS at 11:12

## 2023-12-04 RX ADMIN — SENNOSIDES 8.6 MG: 8.6 TABLET, FILM COATED ORAL at 08:12

## 2023-12-04 RX ADMIN — PROPOFOL 50 MCG/KG/MIN: 10 INJECTION, EMULSION INTRAVENOUS at 06:12

## 2023-12-04 RX ADMIN — INSULIN ASPART 1 UNITS: 100 INJECTION, SOLUTION INTRAVENOUS; SUBCUTANEOUS at 08:12

## 2023-12-04 RX ADMIN — VASOPRESSIN 2 UNITS: 20 INJECTION INTRAVENOUS at 03:12

## 2023-12-04 RX ADMIN — CEFEPIME 1 G: 1 INJECTION, POWDER, FOR SOLUTION INTRAMUSCULAR; INTRAVENOUS at 06:12

## 2023-12-04 RX ADMIN — SODIUM CHLORIDE, SODIUM GLUCONATE, SODIUM ACETATE, POTASSIUM CHLORIDE, MAGNESIUM CHLORIDE, SODIUM PHOSPHATE, DIBASIC, AND POTASSIUM PHOSPHATE: .53; .5; .37; .037; .03; .012; .00082 INJECTION, SOLUTION INTRAVENOUS at 03:12

## 2023-12-04 RX ADMIN — AMIODARONE HYDROCHLORIDE 400 MG: 200 TABLET ORAL at 08:12

## 2023-12-04 RX ADMIN — FAMOTIDINE 20 MG: 10 INJECTION, SOLUTION INTRAVENOUS at 09:12

## 2023-12-04 RX ADMIN — PANTOPRAZOLE SODIUM 40 MG: 40 INJECTION, POWDER, FOR SOLUTION INTRAVENOUS at 08:12

## 2023-12-04 RX ADMIN — OXYCODONE HYDROCHLORIDE 10 MG: 10 TABLET ORAL at 03:12

## 2023-12-04 RX ADMIN — MIDAZOLAM HYDROCHLORIDE 4 MG: 1 INJECTION, SOLUTION INTRAMUSCULAR; INTRAVENOUS at 02:12

## 2023-12-04 RX ADMIN — HYDROMORPHONE HYDROCHLORIDE 0.5 MG: 0.5 INJECTION, SOLUTION INTRAMUSCULAR; INTRAVENOUS; SUBCUTANEOUS at 08:12

## 2023-12-04 RX ADMIN — EPINEPHRINE 0.06 MCG/KG/MIN: 1 INJECTION INTRAMUSCULAR; INTRAVENOUS; SUBCUTANEOUS at 11:12

## 2023-12-04 RX ADMIN — PROPOFOL 50 MCG/KG/MIN: 10 INJECTION, EMULSION INTRAVENOUS at 10:12

## 2023-12-04 RX ADMIN — HYDROMORPHONE HYDROCHLORIDE 0.5 MG: 0.5 INJECTION, SOLUTION INTRAMUSCULAR; INTRAVENOUS; SUBCUTANEOUS at 11:12

## 2023-12-04 RX ADMIN — ATORVASTATIN CALCIUM 40 MG: 40 TABLET, FILM COATED ORAL at 08:12

## 2023-12-04 RX ADMIN — ROCURONIUM BROMIDE 50 MG: 10 INJECTION INTRAVENOUS at 03:12

## 2023-12-04 RX ADMIN — CEFAZOLIN 2 G: 330 INJECTION, POWDER, FOR SOLUTION INTRAMUSCULAR; INTRAVENOUS at 03:12

## 2023-12-04 RX ADMIN — HYDROMORPHONE HYDROCHLORIDE 0.5 MG: 0.5 INJECTION, SOLUTION INTRAMUSCULAR; INTRAVENOUS; SUBCUTANEOUS at 03:12

## 2023-12-04 RX ADMIN — PROPOFOL 50 MCG/KG/MIN: 10 INJECTION, EMULSION INTRAVENOUS at 02:12

## 2023-12-04 RX ADMIN — POLYETHYLENE GLYCOL 3350 17 G: 17 POWDER, FOR SOLUTION ORAL at 08:12

## 2023-12-04 RX ADMIN — FENTANYL CITRATE 25 MCG: 50 INJECTION INTRAMUSCULAR; INTRAVENOUS at 11:12

## 2023-12-04 RX ADMIN — INSULIN HUMAN 1.7 UNITS/HR: 1 INJECTION, SOLUTION INTRAVENOUS at 04:12

## 2023-12-04 RX ADMIN — ROCURONIUM BROMIDE 50 MG: 10 INJECTION INTRAVENOUS at 02:12

## 2023-12-04 RX ADMIN — HYDROMORPHONE HYDROCHLORIDE 0.5 MG: 0.5 INJECTION, SOLUTION INTRAMUSCULAR; INTRAVENOUS; SUBCUTANEOUS at 07:12

## 2023-12-04 RX ADMIN — SODIUM CHLORIDE 15 MG/HR: 9 INJECTION, SOLUTION INTRAVENOUS at 02:12

## 2023-12-04 NOTE — PROGRESS NOTES
[facility] - [facility]  Endocrinology  Progress Note    Admit Date: 11/9/2023     Reason for Consult: Management of T2DM, Hyperglycemia     Surgical Procedure and Date: n/a    Diabetes diagnosis year: 1998    Home Diabetes Medications:  Metformin (off since October)   Lab Results   Component Value Date    HGBA1C 7.6 (H) 10/12/2023       How often checking glucose at home?  Once daily in the AM    BG readings on regimen: 150-160s  Hypoglycemia on the regimen?  No  Missed doses on regimen?  n/a    Diabetes Complications include:     Hyperglycemia    Complicating diabetes co morbidities:   CAD s/p CABG, HTN, HLD      HPI:   Patient is a 63 y.o. male with a diagnosis of CAD s/p 3v CABG (unclear anatomy, 2009), ICM with a recent EF of 15-20% s/p ICD (medtronik 2009), DM2 (a1c 7.7), HTN, HLD, Vfib on amio who presents to the ED with CC of SOB. In the ED he was AF with stable VS on RA.  CBC showing chronic anemia.  CMP notable for acute on chronic CKD with baseline around 2.1 and Cr now 2.6.  BNP elevated.  Lactate neg.  CXR showing pulmonary edema. He was subsequently admitted to the CCU for diuresis.  Endocrinology consulted for management of T2DM.          Interval HPI:   Overnight events: No acute events overnight. Patient in room 16509/21684 A. Blood glucose stable. BG at goal on current insulin regimen (Transition Insulin Drip). Steroid use- None. 3 Days Post-Op  Renal function- Abnormal    Vasopressors-  Epinephrine       Endocrine will continue to follow and manage insulin orders inpatient.         Diet NPO Except for: Medication     Eating:   NPO  Nausea: No  Hypoglycemia and intervention: No  Fever: No  TPN and/or TF: No      BP (!) 84/0 (BP Location: Right arm, Patient Position: Lying)   Pulse 90   Temp 98.9 °F (37.2 °C) (Oral)   Resp 16   Ht 6' (1.829 m)   Wt 77.5 kg (170 lb 13.7 oz)   SpO2 100%   BMI 23.17 kg/m²     Labs Reviewed and Include    Recent Labs   Lab 12/04/23  0412 12/04/23  0804  "  * 98   CALCIUM 8.7 8.6*   ALBUMIN 2.3*  2.2*  --    PROT 6.1  6.1  --     136   K 3.8 3.8   CO2 19* 20*    104   BUN 59* 59*   CREATININE 2.7* 2.7*   ALKPHOS 77  79  --    ALT 33  35  --    AST 38  39  --    BILITOT 0.6  0.6  --      Lab Results   Component Value Date    WBC 15.56 (H) 12/04/2023    HGB 7.3 (L) 12/04/2023    HCT 21.3 (L) 12/04/2023    MCV 86 12/04/2023     12/04/2023     No results for input(s): "TSH", "FREET4" in the last 168 hours.  Lab Results   Component Value Date    HGBA1C 7.6 (H) 10/12/2023       Nutritional status:   Body mass index is 23.17 kg/m².  Lab Results   Component Value Date    ALBUMIN 2.3 (L) 12/04/2023    ALBUMIN 2.2 (L) 12/04/2023    ALBUMIN 2.4 (L) 12/04/2023     Lab Results   Component Value Date    PREALBUMIN 19 (L) 12/01/2023    PREALBUMIN 27 11/15/2023       Estimated Creatinine Clearance: 31.5 mL/min (A) (based on SCr of 2.7 mg/dL (H)).    Accu-Checks  Recent Labs     12/03/23  0357 12/03/23  0501 12/03/23  0607 12/03/23  0756 12/03/23  1119 12/03/23  1523 12/03/23  2052 12/04/23  0013 12/04/23  0410 12/04/23  0724   POCTGLUCOSE 189* 237* 222* 232* 215* 165* 148* 142* 129* 101       Current Medications and/or Treatments Impacting Glycemic Control  Immunotherapy:    Immunosuppressants       None          Steroids:   Hormones (From admission, onward)      None          Pressors:    Autonomic Drugs (From admission, onward)      Start     Stop Route Frequency Ordered    12/01/23 1625  EPINEPHrine 5 mg in dextrose 5% 250 mL infusion (premix)        Question Answer Comment   Begin at (in mcg/kg/min): 0.1    Titrate by: (in mcg/kg/min) 0.01    Titrate interval: (in minutes) 5    Titrate to maintain: (SBP or MAP or Cardiac Index) MAP    Greater than: (in mmHg) 65    Maximum dose: (in mcg/kg/min) 2        -- IV Continuous 12/01/23 6974          Hyperglycemia/Diabetes Medications:   Antihyperglycemics (From admission, onward)      Start     Stop Route " Frequency Ordered    12/04/23 0415  insulin regular in 0.9 % NaCl 100 unit/100 mL (1 unit/mL) infusion        Question:  Enter initial dose (Units/hr):  Answer:  1.4    -- IV Continuous 12/04/23 0411 12/03/23 0852  insulin aspart U-100 pen 0-5 Units         -- SubQ As needed (PRN) 12/03/23 0752            ASSESSMENT and PLAN    Cardiac/Vascular  * Acute on chronic combined systolic and diastolic CHF, NYHA class 4  Managed per primary team  Optimize BG control    PAF (paroxysmal atrial fibrillation)  May increase insulin resistance.         Renal/  IVÁN (acute kidney injury)  Lab Results   Component Value Date    CREATININE 2.7 (H) 12/04/2023     Avoid insulin stacking  Titrate insulin slowly       Endocrine  Type 2 diabetes mellitus without complication, without long-term current use of insulin  Endocrinology consulted for BG management.   BG goal 140-180     - Transition drip at 1.4 units/hr with step-down parameters.   - Novolog (aspart) insulin prn for BG excursions [facility] SSI (150/25).  - BG checks q4hr  - Hypoglycemia protocol in place    ** Please notify Endocrine for any change and/or advance in diet**  ** Please call Endocrine for any BG related issues **    Discharge Planning:   TBD. Please notify endocrinology prior to discharge.                 Erasmo Parrish, DNP, FNP  Endocrinology  [facility] - [facility]

## 2023-12-04 NOTE — PROGRESS NOTES
[facility] - Surgical Intensive Care  Critical Care - Surgery  Progress Note    Patient Name: Radha Abbott  MRN: [id number]  Admission Date: 11/9/2023  Hospital Length of Stay: 25 days  Code Status: Full Code  Attending Provider: Yuri Washington MD  Primary Care Provider: Vasu Kong MD   Principal Problem: Acute on chronic combined systolic and diastolic CHF, NYHA class 4    Subjective:     Hospital/ICU Course:  No notes on file    Interval History/Significant Events: NAEON, given Diuril with improvement in UOP, Reassuring SAT/SBT yesterday moving all extremities. LVAD at 4800 rpm, 3.6 L/min. Plans for chest closure today. On Nitric 5ppm. PT/OT/Speech consulted.    Follow-up For: Procedure(s) (LRB):  INSERTION-LEFT VENTRICULAR ASSIST DEVICE (Left)  STERNOTOMY, REDO (N/A)  VALVULOPLASTY, MITRAL VALVE (N/A)  LYSIS, ADHESIONS  REPAIR, AORTIC VALVE (N/A)  CLOSURE, TEMPORARY (N/A)  ANGIOPLASTY-VENOUS ARTERY, RIGHT FEMORAL (Right)  REPAIR, ANEURYSM, ARTERY, FEMORAL (Right)    Post-Operative Day: 3 Days Post-Op    Objective:     Vital Signs (Most Recent):  Temp: 98.7 °F (37.1 °C) (12/04/23 0307)  Pulse: 90 (12/04/23 0600)  Resp: 16 (12/04/23 0726)  BP: (!) 84/0 (12/04/23 0330)  SpO2: 100 % (12/04/23 0600) Vital Signs (24h Range):  Temp:  [97.8 °F (36.6 °C)-99.1 °F (37.3 °C)] 98.7 °F (37.1 °C)  Pulse:  [83-91] 90  Resp:  [16-18] 16  SpO2:  [95 %-100 %] 100 %  BP: (84-86)/(0) 84/0  Arterial Line BP: (82-91)/(57-73) 85/67     Weight:  (Bed scale inoperable)  Body mass index is 23.17 kg/m².      Intake/Output Summary (Last 24 hours) at 12/4/2023 0732  Last data filed at 12/4/2023 0600  Gross per 24 hour   Intake 1996.66 ml   Output 3966 ml   Net -1969.34 ml          Physical Exam  Vitals and nursing note reviewed.   Constitutional:       General: He is not in acute distress.     Appearance: Normal appearance.   HENT:      Head: Normocephalic and atraumatic.      Mouth/Throat:      Mouth: Mucous membranes are moist.       Pharynx: Oropharynx is clear.   Cardiovascular:      Rate and Rhythm: Normal rate and regular rhythm.      Comments: Open chest,  bloody  RPM 4800, flow 3.6  Pulmonary:      Effort: Pulmonary effort is normal. No respiratory distress.      Breath sounds: Normal breath sounds.      Comments: intubated  Abdominal:      General: Abdomen is flat. There is no distension.      Palpations: Abdomen is soft.      Tenderness: There is no abdominal tenderness.   Genitourinary:     Comments: goodman  Musculoskeletal:         General: Normal range of motion.      Right lower leg: No edema.      Left lower leg: No edema.   Skin:     General: Skin is warm and dry.   Neurological:      Comments: sedated            Vents:  Vent Mode: A/C (12/04/23 0730)  Ventilator Initiated: Yes (12/01/23 1529)  Set Rate: 16 BPM (12/04/23 0730)  Vt Set: 490 mL (12/04/23 0730)  PEEP/CPAP: 5 cmH20 (12/04/23 0730)  Oxygen Concentration (%): 40 (Simultaneous filing. User may not have seen previous data.) (12/04/23 0730)  Peak Airway Pressure: 22 cmH20 (12/04/23 0730)  Plateau Pressure: 18 cmH20 (12/04/23 0730)  Total Ve: 7.65 L/m (12/04/23 0730)  Negative Inspiratory Force (cm H2O): 0 (12/04/23 0730)  F/VT Ratio<105 (RSBI): (!) 33.47 (12/04/23 0730)    Lines/Drains/Airways       Central Venous Catheter Line  Duration              Introducer with Double Lumen 12/01/23 1554 Internal Jugular Right 2 days    Pulmonary Artery Catheter Assessment  12/01/23 1556 Internal Jugular Right 2 days    Trialysis (Dialysis) Catheter 12/01/23 1530 left internal jugular 2 days              Drain  Duration                  Chest Tube Tube - 1 Right Mediastinal -- days         Chest Tube 12/01/23 Tube - 2 Left Mediastinal 3 days         NG/OG Tube 12/01/23 1545 orogastric Center mouth 2 days         Urethral Catheter 12/01/23 0754 Temperature probe;Silicone;Non-latex 14 Fr. 2 days              Airway  Duration                  Airway - Non-Surgical 12/01/23 1534  Endotracheal Tube 2 days              Arterial Line  Duration             Arterial Line 12/01/23 1500 Left Radial 2 days              Line  Duration                  VAD 12/01/23 1015 Left ventricular assist device HeartMate 3 2 days              Peripheral Intravenous Line  Duration                  Peripheral IV - Single Lumen 11/29/23 1100 20 G Left Forearm 4 days         Peripheral IV - Single Lumen 12/01/23 2227 20 G Right Hand 2 days                    Significant Labs:    CBC/Anemia Profile:  Recent Labs   Lab 12/03/23  1759 12/04/23 0017 12/04/23  0412   WBC 15.05* 15.50* 15.56*   HGB 7.7* 7.5* 7.3*   HCT 22.7* 21.4* 21.3*    172 184   MCV 86 82 86   RDW 16.1* 16.1* 15.9*        Chemistries:  Recent Labs   Lab 12/03/23 1759 12/04/23 0017 12/04/23 0412    138 136   K 4.0 3.9 3.8    104 103   CO2 20* 20* 19*   BUN 54* 58* 59*   CREATININE 2.9* 2.6* 2.7*   CALCIUM 8.6* 8.4* 8.7   ALBUMIN 2.4* 2.4* 2.3*  2.2*   PROT 6.1 5.5* 6.1  6.1   BILITOT 0.6 0.6 0.6  0.6   ALKPHOS 65 79 77  79   ALT 36 35 33  35   AST 48* 43* 38  39   MG 2.4 2.4 2.4   PHOS 6.5* 7.4* 7.0*       Significant Imaging:  I have reviewed all pertinent imaging results/findings within the past 24 hours.  Assessment/Plan:     Cardiac/Vascular  * Acute on chronic combined systolic and diastolic CHF, NYHA class 4    Neuro/Psych:   -- Sedation: propofol 50mcg/kg/min  -- Pain:    - fentanyl PRN, dilaudid 0.5 q2 PRN while intubated, Oxy PRN  -- SAT daily while intubated             Cardiac:   -- S/P redo sternotomy LVAD with Dr. Washington on 12/01, chest remaining open  -- Plans for chest closure 12/4  -- MAP Goal: 70-80  -- Cardene PRN  -- Pressors: epi 0.06,  5   -- levo, vaso, shaji off  -- Anti-HTNs: Cardene   -- Rhythm: NSR  -- amiodarone 400mg daily  -- LVAD heartmate III: RPM 4800, flow 3.6  -- Beta blocker: Will start when appropriate  -- Statin: Atorvastatin 40 mg QD  -- No ASA at this time      Pulmonary:   -- Goal  SpO2 >92%  -- Intubated, minimal vent settings  -- Ketty weaned to 5.    -- Chest Tubes x 2 (2 Meds):   - right: 286/24 hrs    - left: 18 (34)  -- q6 ABGs and lactates      Renal:  -- Trend BUN/Cr   -- Maintain [place], record strict Is/Os  -- lasix gtt 15, diuril 500 overnight   -- UOP 1600mls/12hr, net - 2L/24 hrs  -- goal to be net neg 1.5-2L/24hr      Recent Labs   Lab 12/03/23 1759 12/04/23 0017 12/04/23 0412   BUN 54* 58* 59*   CREATININE 2.9* 2.6* 2.7*           FEN / GI:   -- Daily CMP, PRN K/Mag/Phos per protocol   -- Replace electrolytes as needed  -- Nutrition: NPO  -- OG tube ok for meds   - Plan for NGT  -- Bowel Regimen: Miralax, docusate      ID:   -- Afebrile  -- WBC stable  -- Abx: cefepime. NO vanc  -- vanc level 7.8    Recent Labs   Lab 12/03/23 1759 12/04/23 0017 12/04/23 0412   WBC 15.05* 15.50* 15.56*           Heme/Onc:   -- Hgb 8.7 pre-operatively  -- received 7 RBC, 2 FFP, 1 platelet, 10 cry intra-op  -- 12/1: 1 RBC, 4 FFP, 2 plts, 1 cryo  -- q6 CBC and coags  -- q4 fibrinogen - keep fibrinogen >300  -- holding ASA    Recent Labs   Lab 12/03/23 1759 12/04/23  0017 12/04/23 0412   HGB 7.7* 7.5* 7.3*    172 184   APTT 26.3 25.8 26.2   INR 1.0 1.0 1.0           Endocrine:   -- CTS Goal -140  -- HgbA1c: 7.6  -- Endocrinology consulted for insulin management      PPx:   Feeding: NPO  Analgesia/Sedation: fent, dilaudid / Prop  Thromboembolic Prevention: SCDs  HOB >30: Yes  Stress Ulcer: pantoprazole   Glucose Control: Yes, insulin management per Endocrinology  Bowel reg:   Invasive Lines/Drains/Airway:   ETT  OGT  Left radial arterial line  RIJ mac w/ slick + swan   LIJ quad CVC  [place]pton  Chest Tubes: 2 Mediastinal  LVAD     Dispo/Code Status/Palliative:     - Continue SICU Care    - Full Code         Josr Morales MD  Critical Care - Surgery  [facility] - Surgical Intensive Care

## 2023-12-04 NOTE — SUBJECTIVE & OBJECTIVE
Interval History: Patient POD #3 s/p HM3 LVAD implant. Net -ve 2.1 L/24 hours with CVP 9, PA 39/15, PCWP 15 on IV Lasix gtt at 15 mg/hr with plans to decrease to 7.5 this morning. Remains on  5, epi 0.06, Ketty 5 ppm, and Cardene. To OR for chest closure this afternoon.       Continuous Infusions:   sodium chloride 0.9% 10 mL/hr at 23 1000    DOBUTamine 5 mcg/kg/min (23 1000)    EPINEPHrine 0.06 mcg/kg/min (23 1000)    furosemide (LASIX) 2 mg/mL continuous infusion (non-titrating)      insulin regular 1 units/mL infusion orderable (TRANSFER) 1.4 Units/hr (23 1000)    nicardipine 5 mg/hr (23 1000)    nitric oxide gas      propofoL 50 mcg/kg/min (23 1002)     Scheduled Meds:   [START ON 2023] amiodarone  400 mg Per NG tube Daily    [START ON 2023] atorvastatin  40 mg Per NG tube Daily    ceFEPime (MAXIPIME) IVPB  1 g Intravenous Q12H    [START ON 2023] ferrous gluconate  324 mg Per NG tube Daily with breakfast    mupirocin   Nasal BID    pantoprazole  40 mg Intravenous BID    [START ON 2023] polyethylene glycol  17 g Per NG tube Daily    senna-docusate 8.6-50 mg  1 tablet Per NG tube BID     PRN Meds:acetaminophen, albumin human 5%, albuterol sulfate, bisacodyL, dextrose 10%, dextrose 10%, [] fentaNYL **FOLLOWED BY** fentaNYL, gabapentin, glucagon (human recombinant), glucose, glucose, HYDROmorphone, insulin aspart U-100, magnesium hydroxide 400 mg/5 ml, magnesium sulfate IVPB, mupirocin, ondansetron, oxyCODONE, oxyCODONE, potassium chloride, potassium chloride, senna-docusate 8.6-50 mg, sodium chloride 0.9%    Review of patient's allergies indicates:  No Known Allergies  Objective:     Vital Signs (Most Recent):  Temp: 98.9 °F (37.2 °C) (23 0700)  Pulse: 90 (23 09)  Resp: 16 (23)  BP: (!) 84/0 (23 0330)  SpO2: 100 % (23) Vital Signs (24h Range):  Temp:  [97.8 °F (36.6 °C)-99.1 °F (37.3 °C)] 98.9 °F (37.2  °C)  Pulse:  [83-90] 90  Resp:  [16-18] 16  SpO2:  [95 %-100 %] 100 %  BP: (84-86)/(0) 84/0  Arterial Line BP: (82-91)/(57-73) 87/66     No data found.    Body mass index is 23.17 kg/m².      Intake/Output Summary (Last 24 hours) at 12/4/2023 1108  Last data filed at 12/4/2023 1000  Gross per 24 hour   Intake 2002.63 ml   Output 3785 ml   Net -1782.37 ml         Hemodynamic Parameters:  PAP: (22-37)/(11-23) 31/11  PAP (Mean):  [19 mmHg-27 mmHg] 19 mmHg    Telemetry: NSR        Physical Exam  Constitutional:       Comments: Intubated and sedated   HENT:      Head: Normocephalic and atraumatic.   Cardiovascular:      Rate and Rhythm: Normal rate and regular rhythm.      Comments: LVAD hum  Pulmonary:      Effort: Pulmonary effort is normal.      Breath sounds: Normal breath sounds.      Comments: Mechanically ventilated  Chest:      Comments: CT x2  Abdominal:      General: Abdomen is flat. Bowel sounds are normal. There is no distension.      Palpations: Abdomen is soft.   Musculoskeletal:         General: Normal range of motion.      Cervical back: Neck supple.   Skin:     General: Skin is warm and dry.   Neurological:      Comments: sedated            Significant Labs:  CBC:  Recent Labs   Lab 12/03/23 1759 12/04/23 0017 12/04/23 0412   WBC 15.05* 15.50* 15.56*   RBC 2.65* 2.60* 2.48*   HGB 7.7* 7.5* 7.3*   HCT 22.7* 21.4* 21.3*    172 184   MCV 86 82 86   MCH 29.1 28.8 29.4   MCHC 33.9 35.0 34.3       BNP:  Recent Labs   Lab 11/29/23  1834 12/04/23  0412   * 285*       CMP:  Recent Labs   Lab 12/03/23 1759 12/04/23 0017 12/04/23 0412 12/04/23  0804   * 133* 120* 98   CALCIUM 8.6* 8.4* 8.7 8.6*   ALBUMIN 2.4* 2.4* 2.3*  2.2*  --    PROT 6.1 5.5* 6.1  6.1  --     138 136 136   K 4.0 3.9 3.8 3.8   CO2 20* 20* 19* 20*    104 103 104   BUN 54* 58* 59* 59*   CREATININE 2.9* 2.6* 2.7* 2.7*   ALKPHOS 65 79 77  79  --    ALT 36 35 33  35  --    AST 48* 43* 38  39  --    BILITOT  0.6 0.6 0.6  0.6  --         Coagulation:   Recent Labs   Lab 12/03/23  1759 12/04/23  0017 12/04/23  0412   INR 1.0 1.0 1.0   APTT 26.3 25.8 26.2       LDH:  Recent Labs   Lab 12/01/23  1851 12/04/23  0412   * 528*       Microbiology:  Microbiology Results (last 7 days)       Procedure Component Value Units Date/Time    Blood culture [[id number]] Collected: 11/28/23 1452    Order Status: Completed Specimen: Blood from Peripheral, Upper Arm, Right Updated: 12/03/23 1612     Blood Culture, Routine No growth after 5 days.    Blood culture [[id number]] Collected: 11/28/23 1450    Order Status: Completed Specimen: Blood from Peripheral, Forearm, Right Updated: 12/03/23 1612     Blood Culture, Routine No growth after 5 days.            BMP:   Recent Labs   Lab 12/04/23  0804   GLU 98      K 3.8      CO2 20*   BUN 59*   CREATININE 2.7*   CALCIUM 8.6*   MG 2.4       I have reviewed all pertinent labs within the past 24 hours.    Estimated Creatinine Clearance: 31.5 mL/min (A) (based on SCr of 2.7 mg/dL (H)).    Diagnostic Results:  I have reviewed all pertinent imaging results/findings within the past 24 hours.

## 2023-12-04 NOTE — NURSING
SICU PLAN OF CARE NOTE    Dx: Acute on chronic combined systolic and diastolic CHF, NYHA class 4    Goals of Care: Chest closure today.     Vital Signs (last 12 hours):   Temp:  [98.7 °F (37.1 °C)-99.1 °F (37.3 °C)]   Pulse:  [85-90]   Resp:  [16-17]   BP: (84)/(0)   SpO2:  [95 %-100 %]   Arterial Line BP: (82-89)/(57-70)      Neuro:     Patient moves all extremities. We remain unable to have duration of Propofol pause sufficient for patient to follow commands 2/2 open chest safety; patient attempting to sit up, pulls legs up and to either side, pulls strongly at wrist restraints.    Through the shift patient requiring OGT oxycodone 10mg and IV hydromorphone 0.5mg multiple times, while Propofol remains at 50, to keep calm.     Cardiac: Regular in the 80s; VAD interference precludes further interpretation.     Respiratory: AC/VC 40% with NO, which was weaned from 7 to 6 overnight.    Gtts: Epinephrine , Dobutamine, Lasix, Propofol, Insulin, and Cardene    Urine Output: Met net negative goal of 1.5-2. Patient net negative 2L at shift change.    Drains: Mediastinal drains continue to taper. Ioban remains intact w/o changes to scant drainage at interior and superior borders.    Diet: NPO    VAD No alarms overnight. See flowsheets for trends.     Restraints Bilateral, soft, without signs of injury.    Skin: Unable to complete full assessment or fully turn 2/2 open chest. Heels offloaded with boots.     Shift Events:    Weaned NO to 5ppm from 7ppm; CVP 7-8 through shift. After wean, PA systolic uptrend from the mid-20s to mid-30s.  PA port of swan huerta not draw back; defer this morning SvO2 per Dr. Brizuela.    Patient reviewed with residents through the shift, and with Dr. Brizuela during the writing of this note.

## 2023-12-04 NOTE — SUBJECTIVE & OBJECTIVE
"Interval HPI:   Overnight events: No acute events overnight. Patient in room 96625/37229 A. Blood glucose stable. BG at goal on current insulin regimen (Transition Insulin Drip). Steroid use- None. 3 Days Post-Op  Renal function- Abnormal    Vasopressors-  Epinephrine       Endocrine will continue to follow and manage insulin orders inpatient.         Diet NPO Except for: Medication     Eating:   NPO  Nausea: No  Hypoglycemia and intervention: No  Fever: No  TPN and/or TF: No      BP (!) 84/0 (BP Location: Right arm, Patient Position: Lying)   Pulse 90   Temp 98.9 °F (37.2 °C) (Oral)   Resp 16   Ht 6' (1.829 m)   Wt 77.5 kg (170 lb 13.7 oz)   SpO2 100%   BMI 23.17 kg/m²     Labs Reviewed and Include    Recent Labs   Lab 12/04/23  0412 12/04/23  0804   * 98   CALCIUM 8.7 8.6*   ALBUMIN 2.3*  2.2*  --    PROT 6.1  6.1  --     136   K 3.8 3.8   CO2 19* 20*    104   BUN 59* 59*   CREATININE 2.7* 2.7*   ALKPHOS 77  79  --    ALT 33  35  --    AST 38  39  --    BILITOT 0.6  0.6  --      Lab Results   Component Value Date    WBC 15.56 (H) 12/04/2023    HGB 7.3 (L) 12/04/2023    HCT 21.3 (L) 12/04/2023    MCV 86 12/04/2023     12/04/2023     No results for input(s): "TSH", "FREET4" in the last 168 hours.  Lab Results   Component Value Date    HGBA1C 7.6 (H) 10/12/2023       Nutritional status:   Body mass index is 23.17 kg/m².  Lab Results   Component Value Date    ALBUMIN 2.3 (L) 12/04/2023    ALBUMIN 2.2 (L) 12/04/2023    ALBUMIN 2.4 (L) 12/04/2023     Lab Results   Component Value Date    PREALBUMIN 19 (L) 12/01/2023    PREALBUMIN 27 11/15/2023       Estimated Creatinine Clearance: 31.5 mL/min (A) (based on SCr of 2.7 mg/dL (H)).    Accu-Checks  Recent Labs     12/03/23  0357 12/03/23  0501 12/03/23  0607 12/03/23  0756 12/03/23  1119 12/03/23  1523 12/03/23  2052 12/04/23  0013 12/04/23  0410 12/04/23  0724   POCTGLUCOSE 189* 237* 222* 232* 215* 165* 148* 142* 129* 101 "       Current Medications and/or Treatments Impacting Glycemic Control  Immunotherapy:    Immunosuppressants       None          Steroids:   Hormones (From admission, onward)      None          Pressors:    Autonomic Drugs (From admission, onward)      Start     Stop Route Frequency Ordered    12/01/23 1625  EPINEPHrine 5 mg in dextrose 5% 250 mL infusion (premix)        Question Answer Comment   Begin at (in mcg/kg/min): 0.1    Titrate by: (in mcg/kg/min) 0.01    Titrate interval: (in minutes) 5    Titrate to maintain: (SBP or MAP or Cardiac Index) MAP    Greater than: (in mmHg) 65    Maximum dose: (in mcg/kg/min) 2        -- IV Continuous 12/01/23 1625          Hyperglycemia/Diabetes Medications:   Antihyperglycemics (From admission, onward)      Start     Stop Route Frequency Ordered    12/04/23 0415  insulin regular in 0.9 % NaCl 100 unit/100 mL (1 unit/mL) infusion        Question:  Enter initial dose (Units/hr):  Answer:  1.4    -- IV Continuous 12/04/23 0411    12/03/23 0852  insulin aspart U-100 pen 0-5 Units         -- SubQ As needed (PRN) 12/03/23 0757

## 2023-12-04 NOTE — OP NOTE
Date of service:  12/4/2026    Preop diagnosis:    1. Status post left ventricular assist device placement  2.  Severe RV dysfunction  3.  Diffuse coagulopathy.  4. Open chest    Postop diagnosis:  Same      Operation:  Washout of the Mediastinum  Creation of Osmany pericardium with Gerotex Membrane  Sternal Closure  Wound Vac 60 X 5 cm in size due to high risk for wound dehiscence and wound complications  Drainage of right pleural effusion-300 cc of straw-colored fluid was removed  6. Removal of 2 lap sponges that were left intentionally for packing to aid in hemostasis      Staff surgeon:  Yuri Washington  First assistant:  Reyna Shaffer  Anesthesia:  GTA  Estimated blood loss:  50 cc    Key findings of the operation:  1.  Significant improvement in diffuse coagulopathy  2.  Improvement in RV dysfunction  3. Removal of 2 lap sponges that were left for packing    Indication of operation:  This is a 63-year-old male who underwent destination therapy left ventricular assist device placement.  Postoperatively the chest was left open due to diffuse coagulopathy in RV dysfunction.  Over the last 72 hours patient did well and the decision was made to take the patient back for possible closure.  Risks benefits were discussed and informed consent obtained.    Operation:  Patient was brought to the operating room placed in a supine position after induction of anesthesia, area was prepped and draped in the usual sterile fashion.  Previously placed temporary dressing was taken down.  Chest retractor was placed.  Copious amount of irrigation was used to irrigate the chest cavity.  All the chest tubes were irrigated and repositioned.  All cannulation sites were checked for good hemostasis.  Creation of pneo-pericardium was done with the help of [place]-Km membrane to help in reentry at the time of transplant or LVAD exchange.  Trevor examination showed right pleural effusion.  A small opening in the pleural space was made and 300 cc of  straw-colored heart failure fluid was removed from the right chest cavity.  Trevor examination showed resolution of effusion.  Sternum was then approximated with # 6 stainless steel wires.  Skin and subcutaneous tissues were closed in multiple layers. Due to patient body habitus and risk for wound and sternal complications retention stiches and wound vac was applied. A 60 X 5 cm wound vac was placed with good seal.  Patient was taken back to the intensive care unit in stable condition.  The cardiac anesthesia and the surgical team accompanied the patient to the ICU for transition of care.  All questions and concerns were addressed.    Medicare registration:  Due to unavailability of an adequately trained cardiothoracic surgery resident performed all parts of the operation myself.

## 2023-12-04 NOTE — PT/OT/SLP PROGRESS
Physical Therapy  Consult    Patient Name:  Radha Abbott   MRN:  [id number]  Admitting Diagnosis:  Acute on chronic combined systolic and diastolic CHF, NYHA class 4   Recent Surgery: Procedure(s) (LRB):  INSERTION-LEFT VENTRICULAR ASSIST DEVICE (Left)  STERNOTOMY, REDO (N/A)  VALVULOPLASTY, MITRAL VALVE (N/A)  LYSIS, ADHESIONS  REPAIR, AORTIC VALVE (N/A)  CLOSURE, TEMPORARY (N/A)  ANGIOPLASTY-VENOUS ARTERY, RIGHT FEMORAL (Right)  REPAIR, ANEURYSM, ARTERY, FEMORAL (Right) 3 Days Post-Op  Admit Date: 11/9/2023  Length of Stay: 25 days    Physical Therapy orders received. Patient not seen today due to he remains intubated with plans for chest closure this PM. Pt is s/p LVAD implant 12/1. PT to attempt when Radha Abbott is medically appropriate for progressive mobility.    Elizabeth Cee PT, DPT  12/4/2023  Pager: [phone]

## 2023-12-04 NOTE — PROGRESS NOTES
[facility] Intensive [facility]  Heart Transplant  Progress Note    Patient Name: Radha Abbott  MRN: [id number]  Admission Date: 2023  Hospital Length of Stay: 25 days  Attending Physician: Yuri Washington MD  Primary Care Provider: Vasu Kong MD  Principal Problem:Acute on chronic combined systolic and diastolic CHF, NYHA class 4    Subjective:   Interval History: Patient POD #3 s/p HM3 LVAD implant. Net -ve 2.1 L/24 hours with CVP 9, PA 39/15, PCWP 15 on IV Lasix gtt at 15 mg/hr with plans to decrease to 7.5 this morning. Remains on  5, epi 0.06, Ketty 5 ppm, and Cardene. To OR for chest closure this afternoon.       Continuous Infusions:   sodium chloride 0.9% 10 mL/hr at 23 1000    DOBUTamine 5 mcg/kg/min (23 1000)    EPINEPHrine 0.06 mcg/kg/min (23 1000)    furosemide (LASIX) 2 mg/mL continuous infusion (non-titrating)      insulin regular 1 units/mL infusion orderable (TRANSFER) 1.4 Units/hr (23 1000)    nicardipine 5 mg/hr (23 1000)    nitric oxide gas      propofoL 50 mcg/kg/min (23 1002)     Scheduled Meds:   [START ON 2023] amiodarone  400 mg Per NG tube Daily    [START ON 2023] atorvastatin  40 mg Per NG tube Daily    ceFEPime (MAXIPIME) IVPB  1 g Intravenous Q12H    [START ON 2023] ferrous gluconate  324 mg Per NG tube Daily with breakfast    mupirocin   Nasal BID    pantoprazole  40 mg Intravenous BID    [START ON 2023] polyethylene glycol  17 g Per NG tube Daily    senna-docusate 8.6-50 mg  1 tablet Per NG tube BID     PRN Meds:acetaminophen, albumin human 5%, albuterol sulfate, bisacodyL, dextrose 10%, dextrose 10%, [] fentaNYL **FOLLOWED BY** fentaNYL, gabapentin, glucagon (human recombinant), glucose, glucose, HYDROmorphone, insulin aspart U-100, magnesium hydroxide 400 mg/5 ml, magnesium sulfate IVPB, mupirocin, ondansetron, oxyCODONE, oxyCODONE, potassium chloride, potassium chloride, senna-docusate 8.6-50 mg, sodium  chloride 0.9%    Review of patient's allergies indicates:  No Known Allergies  Objective:     Vital Signs (Most Recent):  Temp: 98.9 °F (37.2 °C) (12/04/23 0700)  Pulse: 90 (12/04/23 0955)  Resp: 16 (12/04/23 0955)  BP: (!) 84/0 (12/04/23 0330)  SpO2: 100 % (12/04/23 0955) Vital Signs (24h Range):  Temp:  [97.8 °F (36.6 °C)-99.1 °F (37.3 °C)] 98.9 °F (37.2 °C)  Pulse:  [83-90] 90  Resp:  [16-18] 16  SpO2:  [95 %-100 %] 100 %  BP: (84-86)/(0) 84/0  Arterial Line BP: (82-91)/(57-73) 87/66     No data found.    Body mass index is 23.17 kg/m².      Intake/Output Summary (Last 24 hours) at 12/4/2023 1108  Last data filed at 12/4/2023 1000  Gross per 24 hour   Intake 2002.63 ml   Output 3785 ml   Net -1782.37 ml         Hemodynamic Parameters:  PAP: (22-37)/(11-23) 31/11  PAP (Mean):  [19 mmHg-27 mmHg] 19 mmHg    Telemetry: NSR        Physical Exam  Constitutional:       Comments: Intubated and sedated   HENT:      Head: Normocephalic and atraumatic.   Cardiovascular:      Rate and Rhythm: Normal rate and regular rhythm.      Comments: LVAD hum  Pulmonary:      Effort: Pulmonary effort is normal.      Breath sounds: Normal breath sounds.      Comments: Mechanically ventilated  Chest:      Comments: CT x2  Abdominal:      General: Abdomen is flat. Bowel sounds are normal. There is no distension.      Palpations: Abdomen is soft.   Musculoskeletal:         General: Normal range of motion.      Cervical back: Neck supple.   Skin:     General: Skin is warm and dry.   Neurological:      Comments: sedated            Significant Labs:  CBC:  Recent Labs   Lab 12/03/23  1759 12/04/23  0017 12/04/23  0412   WBC 15.05* 15.50* 15.56*   RBC 2.65* 2.60* 2.48*   HGB 7.7* 7.5* 7.3*   HCT 22.7* 21.4* 21.3*    172 184   MCV 86 82 86   MCH 29.1 28.8 29.4   MCHC 33.9 35.0 34.3       BNP:  Recent Labs   Lab 11/29/23  1834 12/04/23  0412   * 285*       CMP:  Recent Labs   Lab 12/03/23  1759 12/04/23  0017 12/04/23  0412  12/04/23  0804   * 133* 120* 98   CALCIUM 8.6* 8.4* 8.7 8.6*   ALBUMIN 2.4* 2.4* 2.3*  2.2*  --    PROT 6.1 5.5* 6.1  6.1  --     138 136 136   K 4.0 3.9 3.8 3.8   CO2 20* 20* 19* 20*    104 103 104   BUN 54* 58* 59* 59*   CREATININE 2.9* 2.6* 2.7* 2.7*   ALKPHOS 65 79 77  79  --    ALT 36 35 33  35  --    AST 48* 43* 38  39  --    BILITOT 0.6 0.6 0.6  0.6  --         Coagulation:   Recent Labs   Lab 12/03/23  1759 12/04/23  0017 12/04/23  0412   INR 1.0 1.0 1.0   APTT 26.3 25.8 26.2       LDH:  Recent Labs   Lab 12/01/23  1851 12/04/23  0412   * 528*       Microbiology:  Microbiology Results (last 7 days)       Procedure Component Value Units Date/Time    Blood culture [[id number]] Collected: 11/28/23 1452    Order Status: Completed Specimen: Blood from Peripheral, Upper Arm, Right Updated: 12/03/23 1612     Blood Culture, Routine No growth after 5 days.    Blood culture [[id number]] Collected: 11/28/23 1450    Order Status: Completed Specimen: Blood from Peripheral, Forearm, Right Updated: 12/03/23 1612     Blood Culture, Routine No growth after 5 days.            BMP:   Recent Labs   Lab 12/04/23  0804   GLU 98      K 3.8      CO2 20*   BUN 59*   CREATININE 2.7*   CALCIUM 8.6*   MG 2.4       I have reviewed all pertinent labs within the past 24 hours.    Estimated Creatinine Clearance: 31.5 mL/min (A) (based on SCr of 2.7 mg/dL (H)).    Diagnostic Results:  I have reviewed all pertinent imaging results/findings within the past 24 hours.  Assessment and Plan:     63 year old male with hx of CAD s/p 3v CABG (unclear anatomy, 2009), ICM with a recent EF of 15-20% s/p ICD (medtronik 2009), DM2 (a1c 7.7), HTN, HLD, Vfib on amio who presents to the ED with CC of SOB     Pt was recently admitted to [facility] as a transfer.  He was started on a Lasix gtt and did well.  Started on gDMT and discharged home on  5 with plans to follow up in HTS clinic for ongoing transplant evaluation  at another facility.  He says that about a few days ago he started to notice LE swelling.  This turned into Nelson and orthopnea.  He says he can't walk to the bathroom without getting SOB.  He also complains of weight gain.  He takes torsemide 20mg BID at home and was told to trial additional Lasix which he did without any improvement.  He was rx metolazone but has not been filled.  He came to the ED     In the ED he was AF with stable VS on RA.  CBC showing chronic anemia.  CMP notable for acute on chronic CKD with baseline around 2.1 and Cr now 2.6.  BNP elevated.  Lactate neg.  CXR showing pulmonary edema.  I evaluated the pt at the bedside.  Bedside TTE showing CVP >15.  He was subsequently admitted to the CCU for diuresis.     He was continued on his home  and was started on a lasix gtt, which he responded well to overnight (net -1700cc. He feels much better this morning as well. Our transplant coordinators have been working on insurance approval and he is now being transferred to [facility] service for transplant evaluation.     * Acute on chronic combined systolic and diastolic CHF, NYHA class 4  Pt with known ICM with an EF of 25% on home  presented with decompensated HF.  Not in cardiogenic shock    RHC 11/14: RA 14, PA 70/35, PCWP 32, CO 4.1, CI 2.1.   Repeat RHC 11/20 RA RA  20  PA 60/29 (39)  PCWP 29    CO  4.6 l/min  CI  2.3 l/min/m2  2D echo 11/21 showed EF 15-20%, mild RV dysfunction, mild MR, LVEDD 6.9     Repeat on 11/24 showed EF 10-15%, RV mildly reduced, mild-mod MR, LVEDD 6.42  - Home  gtt at 5  - home GDMT: entresto 24/26mg BID (on hold 2/2 IVÁN), Hydralazine 25 q8h - held prior to LVAD implant   - home diuretics: Was taking lasix 40 bid  - Bridged to LVAD with IABP       -PT/OT and nutrition   -s/p LVAD (12/1)  -CVP 9, PA 39/15, PCWP 15 on IV Lasix gtt at 15 mg/hr, decreased to 7.5 this morning. Remains on  5, epi 0.06, Ketty 5 ppm, and Cardene   - CTS primary          LVAD (left ventricular  assist device) present  -HeartMate 3 Implanted  12/1/2023  as DT  -CTS Primary  -Implanted by Dr. Washington  -HOLD Coumadin,  Goal INR 2.0-3.0 . Subtherapeutic today  -Antiplatelets Not on ASA  -LDH is stable overall today. Will continue to monitor daily.  -Speed set at  4800   rpm, LSL 4400 rpm   -Interrogation notable for no events  -Not listed for OHTx, declined for OHTX due to comorbidities         Procedure: Device Interrogation Including analysis of device parameters  Current Settings: Ventricular Assist Device  Review of device function is stable/unstable stable        12/4/2023    10:00 AM 12/4/2023     9:00 AM 12/4/2023     8:00 AM 12/4/2023     7:00 AM 12/4/2023     6:00 AM 12/4/2023     5:00 AM 12/4/2023     4:00 AM   TXP LVAD INTERROGATIONS   Type HeartMate3 HeartMate3 HeartMate3 HeartMate3 HeartMate3 HeartMate3 HeartMate3   Flow 3.6 3.6 3.6 3.5 3.6 3.6 3.6   Speed 4800 4800 4800 4800 4800 4800 4800   PI 4.7 5.2 4.8 5.2 5.2 4.9 4.8   Power (Carrington) 3.1 3.2 3.2 3.1 3.1 3.2 3.1   LSL 4400 4400 4400 4400 4400 4400 4400   Pulsatility Intermittent pulse Intermittent pulse Intermittent pulse Intermittent pulse Intermittent pulse Intermittent pulse Intermittent pulse         PAF (paroxysmal atrial fibrillation)  Known hx of pAF. In sinus rhythm on admission, but 1 run of AF RVR overnight. He spontaneously converted.  - Continue home amiodarone 400mg qd  - Stop home Eliquis and continue heparin gtt while in the hospital.       IVÁN (acute kidney injury)  IVÁN on CKD2. Baseline Cr of 1.7-2.0.   - Hold ARNi, entresto  -Trend BMPs daily post VAD    Type 2 diabetes mellitus without complication, without long-term current use of insulin  -A1c 7.6, not insulin dependent  - MDSSI  -diabetic diet ordered/Boost glucose control   - Endocrine following, appreciate assistance with blood glucose management    CAD (coronary artery disease)  -S/p 3vCABG in 2009  - home ASA, HI statin- on hold after surgery     Ventricular  tachycardia  Hx VT s/p ICD placement (medtronic 2009)  - Continue home amio 400mg qd      Uninterrupted Critical Care/Counseling Time (not including procedures): 65 minutes      Vero Lozada PA-C  Heart Transplant  [facility] - [facility]

## 2023-12-04 NOTE — ASSESSMENT & PLAN NOTE
-HeartMate 3 Implanted  12/1/2023  as DT  -CTS Primary  -Implanted by Dr. Washington  -HOLD Coumadin,  Goal INR 2.0-3.0 . Subtherapeutic today  -Antiplatelets Not on ASA  -LDH is stable overall today. Will continue to monitor daily.  -Speed set at  4800   rpm, LSL 4400 rpm   -Interrogation notable for no events  -Not listed for OHTx, declined for OHTX due to comorbidities         Procedure: Device Interrogation Including analysis of device parameters  Current Settings: Ventricular Assist Device  Review of device function is stable/unstable stable        12/4/2023    10:00 AM 12/4/2023     9:00 AM 12/4/2023     8:00 AM 12/4/2023     7:00 AM 12/4/2023     6:00 AM 12/4/2023     5:00 AM 12/4/2023     4:00 AM   TXP LVAD INTERROGATIONS   Type HeartMate3 HeartMate3 HeartMate3 HeartMate3 HeartMate3 HeartMate3 HeartMate3   Flow 3.6 3.6 3.6 3.5 3.6 3.6 3.6   Speed 4800 4800 4800 4800 4800 4800 4800   PI 4.7 5.2 4.8 5.2 5.2 4.9 4.8   Power (Carrington) 3.1 3.2 3.2 3.1 3.1 3.2 3.1   LSL 4400 4400 4400 4400 4400 4400 4400   Pulsatility Intermittent pulse Intermittent pulse Intermittent pulse Intermittent pulse Intermittent pulse Intermittent pulse Intermittent pulse

## 2023-12-04 NOTE — PT/OT/SLP PROGRESS
Occupational Therapy      Patient Name:  Radha Abbott   MRN:  [id number]    Patient not seen today secondary to pt remains intubated and he's scheduled for chest closure procedure in the afternoon.  Will follow-up when appropriate.    12/4/2023

## 2023-12-04 NOTE — TRANSFER OF CARE
Anesthesia Transfer of Care Note    Patient: Radha Abbott    Procedure(s) Performed: Procedure(s) (LRB):  CLOSURE, WOUND, STERNUM (N/A)    Patient location: ICU    Anesthesia Type: general    Transport from OR: Transported from OR intubated on 100% O2  with adequate ventilation controlled by transport ventilator    Post pain: adequate analgesia    Post assessment: no apparent anesthetic complications    Post vital signs: stable    Level of consciousness: sedated    Nausea/Vomiting: no nausea/vomiting    Complications: none    Transfer of care protocol was followed      Last vitals: Visit Vitals  BP (!) 78/0   Pulse 95   Temp 37.1 °C (98.8 °F) (Oral)   Resp 18   Ht 6' (1.829 m)   Wt 77.5 kg (170 lb 13.7 oz)   SpO2 98%   BMI 23.17 kg/m²

## 2023-12-04 NOTE — ASSESSMENT & PLAN NOTE
Neuro/Psych:   -- Sedation: propofol 50mcg/kg/min  -- Pain:    - fentanyl PRN, dilaudid 0.5 q2 PRN while intubated, Oxy PRN  -- SAT daily while intubated             Cardiac:   -- S/P redo sternotomy LVAD with Dr. Washington on 12/01, chest remaining open  -- Plans for chest closure 12/4  -- MAP Goal: 70-80  -- Cardene PRN  -- Pressors: epi 0.06,  5   -- levo, vaso, shaji off  -- Anti-HTNs: Cardene   -- Rhythm: NSR  -- amiodarone 400mg daily  -- LVAD heartmate III: RPM 4800, flow 3.6  -- Beta blocker: Will start when appropriate  -- Statin: Atorvastatin 40 mg QD  -- No ASA at this time      Pulmonary:   -- Goal SpO2 >92%  -- Intubated, minimal vent settings, reassuring SAT  -- Ketty weaned to 5.    -- Chest Tubes x 2 (2 Meds):   - output 286/24hr  -- q6 ABGs and lactates      Renal:  -- Trend BUN/Cr   -- Maintain [place], record strict Is/Os  -- lasix gtt 7.5   -- UOP 1600mls/12hr, net - 2L/24 hrs  -- goal to be net neg 1.5-2L/24hr      Recent Labs   Lab 12/04/23  0017 12/04/23  0412 12/04/23  0804   BUN 58* 59* 59*   CREATININE 2.6* 2.7* 2.7*           FEN / GI:   -- Daily CMP, PRN K/Mag/Phos per protocol   -- Replace electrolytes as needed  -- Nutrition: NPO  -- OG tube ok for meds   - Plan for NGT  -- Bowel Regimen: Miralax, docusate      ID:   -- Afebrile  -- WBC stable  -- Abx: cefepime. NO vanc  -- vanc level 7.8    Recent Labs   Lab 12/03/23  1759 12/04/23  0017 12/04/23  0412   WBC 15.05* 15.50* 15.56*           Heme/Onc:   -- Hgb 8.7 pre-operatively  -- received 7 RBC, 2 FFP, 1 platelet, 10 cry intra-op  -- 12/1: 1 RBC, 4 FFP, 2 plts, 1 cryo  -- q6 CBC and coags  -- q4 fibrinogen - keep fibrinogen >300  -- holding ASA    Recent Labs   Lab 12/03/23  1759 12/04/23  0017 12/04/23  0412   HGB 7.7* 7.5* 7.3*    172 184   APTT 26.3 25.8 26.2   INR 1.0 1.0 1.0           Endocrine:   -- CTS Goal -140  -- HgbA1c: 7.6  -- Endocrinology consulted for insulin management      PPx:   Feeding:  NPO  Analgesia/Sedation: fent, dilaudid / Prop  Thromboembolic Prevention: SCDs  HOB >30: Yes  Stress Ulcer: pantoprazole   Glucose Control: Yes, insulin management per Endocrinology  Bowel reg:   Invasive Lines/Drains/Airway:   ETT  OGT  Left radial arterial line  RIJ mac w/ slick + swan   LIJ quad CVC  [place]  Chest Tubes: 2 Mediastinal  LVAD     Dispo/Code Status/Palliative:     - Continue SICU Care    - Full Code

## 2023-12-04 NOTE — ASSESSMENT & PLAN NOTE
Lab Results   Component Value Date    CREATININE 2.7 (H) 12/04/2023     Avoid insulin stacking  Titrate insulin slowly

## 2023-12-04 NOTE — PROGRESS NOTES
[facility]  Critical Care - Surgery  Progress Note    Patient Name: Radha Abbott  MRN: [id number]  Admission Date: 11/9/2023  Hospital Length of Stay: 25 days  Code Status: Full Code  Attending Provider: Yuri Washington MD  Primary Care Provider: Vasu Kong MD   Principal Problem: Acute on chronic combined systolic and diastolic CHF, NYHA class 4    Subjective:     Hospital/ICU Course:  No notes on file    Interval History/Significant Events: NAEON, given Diuril with improvement in UOP, Reassuring SAT/SBT yesterday moving all extremities. LVAD at 4800 rpm, 3.6 L/min. Plans for chest closure today. On Nitric 5ppm. PT/OT/Speech consulted.    Follow-up For: Procedure(s) (LRB):  INSERTION-LEFT VENTRICULAR ASSIST DEVICE (Left)  STERNOTOMY, REDO (N/A)  VALVULOPLASTY, MITRAL VALVE (N/A)  LYSIS, ADHESIONS  REPAIR, AORTIC VALVE (N/A)  CLOSURE, TEMPORARY (N/A)  ANGIOPLASTY-VENOUS ARTERY, RIGHT FEMORAL (Right)  REPAIR, ANEURYSM, ARTERY, FEMORAL (Right)    Post-Operative Day: 3 Days Post-Op    Objective:     Vital Signs (Most Recent):  Temp: 98.9 °F (37.2 °C) (12/04/23 0700)  Pulse: 90 (12/04/23 0955)  Resp: 16 (12/04/23 0955)  BP: (!) 84/0 (12/04/23 0330)  SpO2: 100 % (12/04/23 0955) Vital Signs (24h Range):  Temp:  [97.8 °F (36.6 °C)-99.1 °F (37.3 °C)] 98.9 °F (37.2 °C)  Pulse:  [83-90] 90  Resp:  [16-18] 16  SpO2:  [95 %-100 %] 100 %  BP: (84-86)/(0) 84/0  Arterial Line BP: (82-91)/(57-73) 87/66     Weight:  (Bed scale inoperable)  Body mass index is 23.17 kg/m².      Intake/Output Summary (Last 24 hours) at 12/4/2023 1032  Last data filed at 12/4/2023 0900  Gross per 24 hour   Intake 2013.52 ml   Output 3890 ml   Net -1876.48 ml            Physical Exam  Vitals and nursing note reviewed.   Constitutional:       General: He is not in acute distress.     Appearance: Normal appearance.   HENT:      Head: Normocephalic and atraumatic.      Mouth/Throat:      Mouth: Mucous membranes are moist.       Pharynx: Oropharynx is clear.   Cardiovascular:      Rate and Rhythm: Normal rate and regular rhythm.      Comments: Open chest,  bloody  RPM 4800, flow 3.6  Pulmonary:      Effort: Pulmonary effort is normal. No respiratory distress.      Breath sounds: Normal breath sounds.      Comments: intubated  Abdominal:      General: Abdomen is flat. There is no distension.      Palpations: Abdomen is soft.      Tenderness: There is no abdominal tenderness.   Genitourinary:     Comments: goodman  Musculoskeletal:         General: Normal range of motion.      Right lower leg: No edema.      Left lower leg: No edema.   Skin:     General: Skin is warm and dry.   Neurological:      Comments: sedated            Vents:  Vent Mode: A/C (12/04/23 0955)  Ventilator Initiated: Yes (12/01/23 1529)  Set Rate: 16 BPM (12/04/23 0955)  Vt Set: 490 mL (12/04/23 0955)  PEEP/CPAP: 5 cmH20 (12/04/23 0955)  Oxygen Concentration (%): 40 (12/04/23 0955)  Peak Airway Pressure: 22 cmH20 (12/04/23 0955)  Plateau Pressure: 18 cmH20 (12/04/23 0955)  Total Ve: 7.66 L/m (12/04/23 0955)  Negative Inspiratory Force (cm H2O): 0 (12/04/23 0955)  F/VT Ratio<105 (RSBI): (!) 33.33 (12/04/23 0955)    Lines/Drains/Airways       Central Venous Catheter Line  Duration              Introducer with Double Lumen 12/01/23 1554 Internal Jugular Right 2 days    Pulmonary Artery Catheter Assessment  12/01/23 1556 Internal Jugular Right 2 days    Trialysis (Dialysis) Catheter 12/01/23 1530 left internal jugular 2 days              Drain  Duration                  Chest Tube Tube - 1 Right Mediastinal -- days         Chest Tube 12/01/23 Tube - 2 Left Mediastinal 3 days         Urethral Catheter 12/01/23 0754 Temperature probe;Silicone;Non-latex 14 Fr. 3 days         NG/OG Tube 12/01/23 1545 orogastric Center mouth 2 days              Airway  Duration                  Airway - Non-Surgical 12/01/23 1534 Endotracheal Tube 2 days              Arterial Line  Duration              Arterial Line 12/01/23 1500 Left Radial 2 days              Line  Duration                  VAD 12/01/23 1015 Left ventricular assist device HeartMate 3 3 days              Peripheral Intravenous Line  Duration                  Peripheral IV - Single Lumen 11/29/23 1100 20 G Left Forearm 4 days         Peripheral IV - Single Lumen 12/01/23 2227 20 G Right Hand 2 days                    Significant Labs:    CBC/Anemia Profile:  Recent Labs   Lab 12/03/23 1759 12/04/23 0017 12/04/23  0412   WBC 15.05* 15.50* 15.56*   HGB 7.7* 7.5* 7.3*   HCT 22.7* 21.4* 21.3*    172 184   MCV 86 82 86   RDW 16.1* 16.1* 15.9*          Chemistries:  Recent Labs   Lab 12/03/23 1759 12/04/23 0017 12/04/23  0412 12/04/23  0804    138 136 136   K 4.0 3.9 3.8 3.8    104 103 104   CO2 20* 20* 19* 20*   BUN 54* 58* 59* 59*   CREATININE 2.9* 2.6* 2.7* 2.7*   CALCIUM 8.6* 8.4* 8.7 8.6*   ALBUMIN 2.4* 2.4* 2.3*  2.2*  --    PROT 6.1 5.5* 6.1  6.1  --    BILITOT 0.6 0.6 0.6  0.6  --    ALKPHOS 65 79 77  79  --    ALT 36 35 33  35  --    AST 48* 43* 38  39  --    MG 2.4 2.4 2.4 2.4   PHOS 6.5* 7.4* 7.0* 7.0*         Significant Imaging:  I have reviewed all pertinent imaging results/findings within the past 24 hours.    Assessment/Plan:     Cardiac/Vascular  * Acute on chronic combined systolic and diastolic CHF, NYHA class 4    Neuro/Psych:   -- Sedation: propofol 50mcg/kg/min  -- Pain:    - fentanyl PRN, dilaudid 0.5 q2 PRN while intubated, Oxy PRN  -- SAT daily while intubated             Cardiac:   -- S/P redo sternotomy LVAD with Dr. Washington on 12/01, chest remaining open  -- Plans for chest closure 12/4  -- MAP Goal: 70-80  -- Cardene PRN  -- Pressors: epi 0.06,  5   -- levo, vaso, shaji off  -- Anti-HTNs: Cardene   -- Rhythm: NSR  -- amiodarone 400mg daily  -- LVAD heartmate III: RPM 4800, flow 3.6  -- Beta blocker: Will start when appropriate  -- Statin: Atorvastatin 40 mg QD  -- No ASA at this time       Pulmonary:   -- Goal SpO2 >92%  -- Intubated, minimal vent settings, reassuring SAT  -- Ketty weaned to 5.    -- Chest Tubes x 2 (2 Meds):   - output 286/24hr  -- q6 ABGs and lactates      Renal:  -- Trend BUN/Cr   -- Maintain [place], record strict Is/Os  -- lasix gtt 7.5   -- UOP 1600mls/12hr, net - 2L/24 hrs  -- goal to be net neg 1.5-2L/24hr      Recent Labs   Lab 12/04/23  0017 12/04/23  0412 12/04/23  0804   BUN 58* 59* 59*   CREATININE 2.6* 2.7* 2.7*           FEN / GI:   -- Daily CMP, PRN K/Mag/Phos per protocol   -- Replace electrolytes as needed  -- Nutrition: NPO  -- OG tube ok for meds   - Plan for NGT  -- Bowel Regimen: Miralax, docusate      ID:   -- Afebrile  -- WBC stable  -- Abx: cefepime. NO vanc  -- vanc level 7.8    Recent Labs   Lab 12/03/23  1759 12/04/23  0017 12/04/23  0412   WBC 15.05* 15.50* 15.56*           Heme/Onc:   -- Hgb 8.7 pre-operatively  -- received 7 RBC, 2 FFP, 1 platelet, 10 cry intra-op  -- 12/1: 1 RBC, 4 FFP, 2 plts, 1 cryo  -- q6 CBC and coags  -- q4 fibrinogen - keep fibrinogen >300  -- holding ASA    Recent Labs   Lab 12/03/23  1759 12/04/23  0017 12/04/23  0412   HGB 7.7* 7.5* 7.3*    172 184   APTT 26.3 25.8 26.2   INR 1.0 1.0 1.0           Endocrine:   -- CTS Goal -140  -- HgbA1c: 7.6  -- Endocrinology consulted for insulin management      PPx:   Feeding: NPO  Analgesia/Sedation: fent, dilaudid / Prop  Thromboembolic Prevention: SCDs  HOB >30: Yes  Stress Ulcer: pantoprazole   Glucose Control: Yes, insulin management per Endocrinology  Bowel reg:   Invasive Lines/Drains/Airway:   ETT  OGT  Left radial arterial line  RIJ mac w/ slick + swan   LIJ quad CVC  [place]  Chest Tubes: 2 Mediastinal  LVAD     Dispo/Code Status/Palliative:     - Continue SICU Care    - Full Code           Josr Morales MD  Critical Care - Surgery  [facility] - Surgical Intensive Care

## 2023-12-04 NOTE — ASSESSMENT & PLAN NOTE
Pt with known ICM with an EF of 25% on home  presented with decompensated HF.  Not in cardiogenic shock    RHC 11/14: RA 14, PA 70/35, PCWP 32, CO 4.1, CI 2.1.   Repeat RHC 11/20 RA RA  20  PA 60/29 (39)  PCWP 29    CO  4.6 l/min  CI  2.3 l/min/m2  2D echo 11/21 showed EF 15-20%, mild RV dysfunction, mild MR, LVEDD 6.9     Repeat on 11/24 showed EF 10-15%, RV mildly reduced, mild-mod MR, LVEDD 6.42  - Home  gtt at 5  - home GDMT: entresto 24/26mg BID (on hold 2/2 IVÁN), Hydralazine 25 q8h - held prior to LVAD implant   - home diuretics: Was taking lasix 40 bid  - Bridged to LVAD with IABP       -PT/OT and nutrition   -s/p LVAD (12/1)  -CVP 9, PA 39/15, PCWP 15 on IV Lasix gtt at 15 mg/hr, decreased to 7.5 this morning. Remains on  5, epi 0.06, Ketty 5 ppm, and Cardene   - CTS primary

## 2023-12-04 NOTE — SUBJECTIVE & OBJECTIVE
Interval History/Significant Events: NAEON, given Diuril with improvement in UOP, Reassuring SAT/SBT yesterday moving all extremities. LVAD at 4800 rpm, 3.6 L/min. Plans for chest closure today. On Nitric 5ppm. PT/OT/Speech consulted.    Follow-up For: Procedure(s) (LRB):  INSERTION-LEFT VENTRICULAR ASSIST DEVICE (Left)  STERNOTOMY, REDO (N/A)  VALVULOPLASTY, MITRAL VALVE (N/A)  LYSIS, ADHESIONS  REPAIR, AORTIC VALVE (N/A)  CLOSURE, TEMPORARY (N/A)  ANGIOPLASTY-VENOUS ARTERY, RIGHT FEMORAL (Right)  REPAIR, ANEURYSM, ARTERY, FEMORAL (Right)    Post-Operative Day: 3 Days Post-Op    Objective:     Vital Signs (Most Recent):  Temp: 98.7 °F (37.1 °C) (12/04/23 0307)  Pulse: 90 (12/04/23 0600)  Resp: 16 (12/04/23 0726)  BP: (!) 84/0 (12/04/23 0330)  SpO2: 100 % (12/04/23 0600) Vital Signs (24h Range):  Temp:  [97.8 °F (36.6 °C)-99.1 °F (37.3 °C)] 98.7 °F (37.1 °C)  Pulse:  [83-91] 90  Resp:  [16-18] 16  SpO2:  [95 %-100 %] 100 %  BP: (84-86)/(0) 84/0  Arterial Line BP: (82-91)/(57-73) 85/67     Weight:  (Bed scale inoperable)  Body mass index is 23.17 kg/m².      Intake/Output Summary (Last 24 hours) at 12/4/2023 0732  Last data filed at 12/4/2023 0600  Gross per 24 hour   Intake 1996.66 ml   Output 3966 ml   Net -1969.34 ml          Physical Exam  Vitals and nursing note reviewed.   Constitutional:       General: He is not in acute distress.     Appearance: Normal appearance.   HENT:      Head: Normocephalic and atraumatic.      Mouth/Throat:      Mouth: Mucous membranes are moist.      Pharynx: Oropharynx is clear.   Cardiovascular:      Rate and Rhythm: Normal rate and regular rhythm.      Comments: Open chest,  bloody  RPM 4800, flow 3.6  Pulmonary:      Effort: Pulmonary effort is normal. No respiratory distress.      Breath sounds: Normal breath sounds.      Comments: intubated  Abdominal:      General: Abdomen is flat. There is no distension.      Palpations: Abdomen is soft.      Tenderness: There is no  abdominal tenderness.   Genitourinary:     Comments: goodman  Musculoskeletal:         General: Normal range of motion.      Right lower leg: No edema.      Left lower leg: No edema.   Skin:     General: Skin is warm and dry.   Neurological:      Comments: sedated            Vents:  Vent Mode: A/C (12/04/23 0730)  Ventilator Initiated: Yes (12/01/23 1529)  Set Rate: 16 BPM (12/04/23 0730)  Vt Set: 490 mL (12/04/23 0730)  PEEP/CPAP: 5 cmH20 (12/04/23 0730)  Oxygen Concentration (%): 40 (Simultaneous filing. User may not have seen previous data.) (12/04/23 0730)  Peak Airway Pressure: 22 cmH20 (12/04/23 0730)  Plateau Pressure: 18 cmH20 (12/04/23 0730)  Total Ve: 7.65 L/m (12/04/23 0730)  Negative Inspiratory Force (cm H2O): 0 (12/04/23 0730)  F/VT Ratio<105 (RSBI): (!) 33.47 (12/04/23 0730)    Lines/Drains/Airways       Central Venous Catheter Line  Duration              Introducer with Double Lumen 12/01/23 1554 Internal Jugular Right 2 days    Pulmonary Artery Catheter Assessment  12/01/23 1556 Internal Jugular Right 2 days    Trialysis (Dialysis) Catheter 12/01/23 1530 left internal jugular 2 days              Drain  Duration                  Chest Tube Tube - 1 Right Mediastinal -- days         Chest Tube 12/01/23 Tube - 2 Left Mediastinal 3 days         NG/OG Tube 12/01/23 1545 orogastric Center mouth 2 days         Urethral Catheter 12/01/23 0754 Temperature probe;Silicone;Non-latex 14 Fr. 2 days              Airway  Duration                  Airway - Non-Surgical 12/01/23 1534 Endotracheal Tube 2 days              Arterial Line  Duration             Arterial Line 12/01/23 1500 Left Radial 2 days              Line  Duration                  VAD 12/01/23 1015 Left ventricular assist device HeartMate 3 2 days              Peripheral Intravenous Line  Duration                  Peripheral IV - Single Lumen 11/29/23 1100 20 G Left Forearm 4 days         Peripheral IV - Single Lumen 12/01/23 2227 20 G Right Hand 2 days                     Significant Labs:    CBC/Anemia Profile:  Recent Labs   Lab 12/03/23 1759 12/04/23 0017 12/04/23  0412   WBC 15.05* 15.50* 15.56*   HGB 7.7* 7.5* 7.3*   HCT 22.7* 21.4* 21.3*    172 184   MCV 86 82 86   RDW 16.1* 16.1* 15.9*        Chemistries:  Recent Labs   Lab 12/03/23 1759 12/04/23 0017 12/04/23 0412    138 136   K 4.0 3.9 3.8    104 103   CO2 20* 20* 19*   BUN 54* 58* 59*   CREATININE 2.9* 2.6* 2.7*   CALCIUM 8.6* 8.4* 8.7   ALBUMIN 2.4* 2.4* 2.3*  2.2*   PROT 6.1 5.5* 6.1  6.1   BILITOT 0.6 0.6 0.6  0.6   ALKPHOS 65 79 77  79   ALT 36 35 33  35   AST 48* 43* 38  39   MG 2.4 2.4 2.4   PHOS 6.5* 7.4* 7.0*       Significant Imaging:  I have reviewed all pertinent imaging results/findings within the past 24 hours.

## 2023-12-04 NOTE — ASSESSMENT & PLAN NOTE
Endocrinology consulted for BG management.   BG goal 140-180     - Transition drip at 1.4 units/hr with step-down parameters.   - Novolog (aspart) insulin prn for BG excursions [facility] (150/25).  - BG checks q4hr  - Hypoglycemia protocol in place    ** Please notify Endocrine for any change and/or advance in diet**  ** Please call Endocrine for any BG related issues **    Discharge Planning:   TBD. Please notify endocrinology prior to discharge.

## 2023-12-04 NOTE — PROGRESS NOTES
Update:  SW met with pt's spouse at bedside. Pt's wife reported that she is doing well at this time.  Pt is returning to surgery today for chest closure per wife.  No needs reported at this time.  SW remains available.

## 2023-12-04 NOTE — ANESTHESIA PROCEDURE NOTES
MOY    Diagnosis: Acute on chronic combined systolic and diastolic CHF, NYHA class 4 [I50.43]  Patient location during procedure: OR  Procedure start time: 12/4/2023 3:15 PM  Timeout: 12/4/2023 3:15 PM  Procedure end time: 12/4/2023 3:34 PM  Surgery related to: chest closure  Exam type: Baseline    Staffing  Performed: anesthesiologist     Anesthesiologist: Albert Benavidez MD        Anesthesiologist Present  Yes      Setup & Induction  Patient preparation: bite block inserted  Probe Insertion: easy  Exam: completeDoppler Echo: 2D, color flow mapping, continuous wave Doppler and pulse wave Doppler.  Exam                                               Effusions left pleural and right pleural    SummaryFindings discussed with surgeon.    Other Findings   Pre Bypass:::    Infusions: epi .06, dobut 5, cardene 1    LV systolic function is severely depressed (LVEF 15%)  LVAD inflow cannula and outflow graft in appropriate position with good flows  LVEDD 7.2 cm  RV systolic function is moderately depressed  Moderate residual MR s/p Noble, no residual AI  Moderate L and R pleural effusions.   No evidence of PFO via color flow doppler   No evidence of KARLA thrombus   No LV thrombus identified       Probe removed atraumatically

## 2023-12-04 NOTE — PROGRESS NOTES
12/04/2023  [place]    Current provider:  Yuri Washington MD    Device interrogation:      12/4/2023     6:00 AM 12/4/2023     5:00 AM 12/4/2023     4:00 AM 12/4/2023     3:00 AM 12/4/2023     2:00 AM 12/4/2023     1:00 AM 12/4/2023    12:00 AM   TXP LVAD INTERROGATIONS   Type HeartMate3 HeartMate3 HeartMate3 HeartMate3 HeartMate3 HeartMate3 HeartMate3   Flow 3.6 3.6 3.6 3.6 3.7 3.5 3.7   Speed 4800 4800 4800 4800 4800 4800 4800   PI 5.2 4.9 4.8 4.6 5.2 5.4 4.9   Power ([place]) 3.1 3.2 3.1 3.2 3.1 3.2 3.2   LSL 4400 4400 4400 4400 4400 4400 4400   Pulsatility Intermittent pulse Intermittent pulse Intermittent pulse Intermittent pulse Intermittent pulse Intermittent pulse Intermittent pulse          Rounded on [facility] to ensure all mechanical assist device settings (IABP or VAD) were appropriate and all parameters were within limits.  I was able to ensure all back up equipment was present, the staff had no issues, and the Perfusion Department daily rounding was complete.      For implantable VADs: Interrogation of Ventricular assist device was performed with analysis of device parameters and review of device function. I have personally reviewed the interrogation findings and agree with findings as stated.     In emergency, the nursing units have been notified to contact the perfusion department either by:  Calling [phone] from 630am to 4pm Mon thru Fri, utilizing the On-Call Finder functionality of Epic and searching for Perfusion, or by contacting the hospital  from 4pm to 630am and on weekends and asking to speak with the perfusionist on call.    7:04 AM

## 2023-12-04 NOTE — INTERVAL H&P NOTE
The patient has been examined and the H&P has been reviewed:    I concur with the findings and no changes have occurred since H&P was written.    Surgery risks, benefits and alternative options discussed and understood by patient/family.    Inotropes and vasopressors successfully weaned over the weekend. To OR today for mediastinal washout and sternal closure.       Active Hospital Problems    Diagnosis  POA    *Acute on chronic combined systolic and diastolic CHF, NYHA class 4 [I50.43]  Yes     Chronic    LVAD (left ventricular assist device) present [Z95.811]  Not Applicable    Pre-transplant evaluation for heart transplant [Z01.818]  Not Applicable    Palliative care encounter [Z51.5]  Not Applicable    Advance care planning [Z71.89]  Not Applicable    PAF (paroxysmal atrial fibrillation) [I48.0]  Yes    IVÁN (acute kidney injury) [N17.9]  Yes    Ventricular tachycardia [I47.20]  Yes     Admit to ICU  Amiodarone infusion  Cardiology consult  Investigate defibrillator - EP study ?      Type 2 diabetes mellitus without complication, without long-term current use of insulin [E11.9]  Yes     Resume home meds  SSI      CAD (coronary artery disease) [I25.10]  Yes     Continue home medications          Resolved Hospital Problems    Diagnosis Date Resolved POA    Atrial fibrillation with tachycardic ventricular rate [I48.91] 11/12/2023 Yes

## 2023-12-04 NOTE — NURSING
Anestheshia and perfusion to bedside. Wife at bedside. Patient packed up, transported to surgery with portable monitor, vad on battery. Will await arrival from surgical procedure. Wife to 2nd floor surgery waiting room.

## 2023-12-04 NOTE — PROGRESS NOTES
Perfusion Standby Note:      12/04/2023  Perfusionist:  Monet Aguiar  [facility]  Surgeon(s) and Role:     * Yuri Washington MD - Primary  Anesthesiologist:  Ebony Oliveira MD    Past Medical History:   Diagnosis Date    CAD (coronary artery disease)     Diabetes mellitus     HFrEF (heart failure with reduced ejection fraction)     ICD (implantable cardioverter-defibrillator) in place     MI, old          Perfusion department standby was requested for this case, utilizing either a full cardiopulmonary machine or ECMO pump.      All pre-op checklists were completed, all equipment was available, as were cannulae and other disposables.  A TAVR instrument tray was also verified as available, per the checklist, for any case requiring ECMO standby.    I fully participated in the briefing and time-out for this procedure.  I was present in the room for all critical portions of this case during which mechanical circulatory support could be required.  Please see cardiopulmonary bypass record for details.  There were no complications.    5:00 PM

## 2023-12-04 NOTE — CARE UPDATE
Care Update: Cardiothoracic Surgery    Returns from OR with Dr. Washington after mediastinal washout, sternal closure, wound vac placement, and drainage of R pleural effusion.    No change to LVAD speed, Lasix gtt to remain at 7.5. Same chest tubes in place as pre-op. Remains intubated.    NGT to be placed.    Melanie Padilla M.D.  General Surgery PGY-II  [facility]

## 2023-12-04 NOTE — ASSESSMENT & PLAN NOTE
Neuro/Psych:   -- Sedation: propofol 50mcg/kg/min  -- Pain:    - fentanyl PRN, dilaudid 0.5 q2 PRN while intubated, Oxy PRN  -- SAT daily while intubated             Cardiac:   -- S/P redo sternotomy LVAD with Dr. Washington on 12/01, chest remaining open  -- Plans for chest closure 12/4  -- MAP Goal: 70-80  -- Cardene PRN  -- Pressors: epi 0.06,  5   -- levo, vaso, shaji off  -- Anti-HTNs: Cardene   -- Rhythm: NSR  -- amiodarone 400mg daily  -- LVAD heartmate III: RPM 4800, flow 3.6  -- Beta blocker: Will start when appropriate  -- Statin: Atorvastatin 40 mg QD  -- No ASA at this time      Pulmonary:   -- Goal SpO2 >92%  -- Intubated, minimal vent settings  -- Ketty weaned to 5.    -- Chest Tubes x 2 (2 Meds):   - right: 286/24 hrs    - left: 18 (34)  -- q6 ABGs and lactates      Renal:  -- Trend BUN/Cr   -- Maintain Hampton, record strict Is/Os  -- lasix gtt 15, diuril 500 overnight   -- UOP 1600mls/12hr, net - 2L/24 hrs  -- goal to be net neg 1.5-2L/24hr      Recent Labs   Lab 12/03/23  1759 12/04/23  0017 12/04/23  0412   BUN 54* 58* 59*   CREATININE 2.9* 2.6* 2.7*           FEN / GI:   -- Daily CMP, PRN K/Mag/Phos per protocol   -- Replace electrolytes as needed  -- Nutrition: NPO  -- OG tube ok for meds   - Plan for NGT  -- Bowel Regimen: Miralax, docusate      ID:   -- Afebrile  -- WBC stable  -- Abx: cefepime. NO vanc  -- vanc level 7.8    Recent Labs   Lab 12/03/23  1759 12/04/23  0017 12/04/23  0412   WBC 15.05* 15.50* 15.56*           Heme/Onc:   -- Hgb 8.7 pre-operatively  -- received 7 RBC, 2 FFP, 1 platelet, 10 cry intra-op  -- 12/1: 1 RBC, 4 FFP, 2 plts, 1 cryo  -- q6 CBC and coags  -- q4 fibrinogen - keep fibrinogen >300  -- holding ASA    Recent Labs   Lab 12/03/23  1759 12/04/23  0017 12/04/23  0412   HGB 7.7* 7.5* 7.3*    172 184   APTT 26.3 25.8 26.2   INR 1.0 1.0 1.0           Endocrine:   -- CTS Goal -140  -- HgbA1c: 7.6  -- Endocrinology consulted for insulin management      PPx:    Feeding: NPO  Analgesia/Sedation: fent, dilaudid / Prop  Thromboembolic Prevention: SCDs  HOB >30: Yes  Stress Ulcer: pantoprazole   Glucose Control: Yes, insulin management per Endocrinology  Bowel reg:   Invasive Lines/Drains/Airway:   ETT  OGT  Left radial arterial line  RIJ mac w/ slick + swan   LIJ quad CVC  Hampton  Chest Tubes: 2 Mediastinal  LVAD     Dispo/Code Status/Palliative:     - Continue SICU Care    - Full Code

## 2023-12-04 NOTE — SUBJECTIVE & OBJECTIVE
Interval History/Significant Events: NAEON, given Diuril with improvement in UOP, Reassuring SAT/SBT yesterday moving all extremities. LVAD at 4800 rpm, 3.6 L/min. Plans for chest closure today. On Nitric 5ppm. PT/OT/Speech consulted.    Follow-up For: Procedure(s) (LRB):  INSERTION-LEFT VENTRICULAR ASSIST DEVICE (Left)  STERNOTOMY, REDO (N/A)  VALVULOPLASTY, MITRAL VALVE (N/A)  LYSIS, ADHESIONS  REPAIR, AORTIC VALVE (N/A)  CLOSURE, TEMPORARY (N/A)  ANGIOPLASTY-VENOUS ARTERY, RIGHT FEMORAL (Right)  REPAIR, ANEURYSM, ARTERY, FEMORAL (Right)    Post-Operative Day: 3 Days Post-Op    Objective:     Vital Signs (Most Recent):  Temp: 98.9 °F (37.2 °C) (12/04/23 0700)  Pulse: 90 (12/04/23 0955)  Resp: 16 (12/04/23 0955)  BP: (!) 84/0 (12/04/23 0330)  SpO2: 100 % (12/04/23 0955) Vital Signs (24h Range):  Temp:  [97.8 °F (36.6 °C)-99.1 °F (37.3 °C)] 98.9 °F (37.2 °C)  Pulse:  [83-90] 90  Resp:  [16-18] 16  SpO2:  [95 %-100 %] 100 %  BP: (84-86)/(0) 84/0  Arterial Line BP: (82-91)/(57-73) 87/66     Weight:  (Bed scale inoperable)  Body mass index is 23.17 kg/m².      Intake/Output Summary (Last 24 hours) at 12/4/2023 1032  Last data filed at 12/4/2023 0900  Gross per 24 hour   Intake 2013.52 ml   Output 3890 ml   Net -1876.48 ml            Physical Exam  Vitals and nursing note reviewed.   Constitutional:       General: He is not in acute distress.     Appearance: Normal appearance.   HENT:      Head: Normocephalic and atraumatic.      Mouth/Throat:      Mouth: Mucous membranes are moist.      Pharynx: Oropharynx is clear.   Cardiovascular:      Rate and Rhythm: Normal rate and regular rhythm.      Comments: Open chest,  bloody  RPM 4800, flow 3.6  Pulmonary:      Effort: Pulmonary effort is normal. No respiratory distress.      Breath sounds: Normal breath sounds.      Comments: intubated  Abdominal:      General: Abdomen is flat. There is no distension.      Palpations: Abdomen is soft.      Tenderness: There is no  abdominal tenderness.   Genitourinary:     Comments: goodman  Musculoskeletal:         General: Normal range of motion.      Right lower leg: No edema.      Left lower leg: No edema.   Skin:     General: Skin is warm and dry.   Neurological:      Comments: sedated            Vents:  Vent Mode: A/C (12/04/23 0955)  Ventilator Initiated: Yes (12/01/23 1529)  Set Rate: 16 BPM (12/04/23 0955)  Vt Set: 490 mL (12/04/23 0955)  PEEP/CPAP: 5 cmH20 (12/04/23 0955)  Oxygen Concentration (%): 40 (12/04/23 0955)  Peak Airway Pressure: 22 cmH20 (12/04/23 0955)  Plateau Pressure: 18 cmH20 (12/04/23 0955)  Total Ve: 7.66 L/m (12/04/23 0955)  Negative Inspiratory Force (cm H2O): 0 (12/04/23 0955)  F/VT Ratio<105 (RSBI): (!) 33.33 (12/04/23 0955)    Lines/Drains/Airways       Central Venous Catheter Line  Duration              Introducer with Double Lumen 12/01/23 1554 Internal Jugular Right 2 days    Pulmonary Artery Catheter Assessment  12/01/23 1556 Internal Jugular Right 2 days    Trialysis (Dialysis) Catheter 12/01/23 1530 left internal jugular 2 days              Drain  Duration                  Chest Tube Tube - 1 Right Mediastinal -- days         Chest Tube 12/01/23 Tube - 2 Left Mediastinal 3 days         Urethral Catheter 12/01/23 0754 Temperature probe;Silicone;Non-latex 14 Fr. 3 days         NG/OG Tube 12/01/23 1545 orogastric Center mouth 2 days              Airway  Duration                  Airway - Non-Surgical 12/01/23 1534 Endotracheal Tube 2 days              Arterial Line  Duration             Arterial Line 12/01/23 1500 Left Radial 2 days              Line  Duration                  VAD 12/01/23 1015 Left ventricular assist device HeartMate 3 3 days              Peripheral Intravenous Line  Duration                  Peripheral IV - Single Lumen 11/29/23 1100 20 G Left Forearm 4 days         Peripheral IV - Single Lumen 12/01/23 2227 20 G Right Hand 2 days                    Significant Labs:    CBC/Anemia  Profile:  Recent Labs   Lab 12/03/23 1759 12/04/23 0017 12/04/23 0412   WBC 15.05* 15.50* 15.56*   HGB 7.7* 7.5* 7.3*   HCT 22.7* 21.4* 21.3*    172 184   MCV 86 82 86   RDW 16.1* 16.1* 15.9*          Chemistries:  Recent Labs   Lab 12/03/23 1759 12/04/23 0017 12/04/23 0412 12/04/23  0804    138 136 136   K 4.0 3.9 3.8 3.8    104 103 104   CO2 20* 20* 19* 20*   BUN 54* 58* 59* 59*   CREATININE 2.9* 2.6* 2.7* 2.7*   CALCIUM 8.6* 8.4* 8.7 8.6*   ALBUMIN 2.4* 2.4* 2.3*  2.2*  --    PROT 6.1 5.5* 6.1  6.1  --    BILITOT 0.6 0.6 0.6  0.6  --    ALKPHOS 65 79 77  79  --    ALT 36 35 33  35  --    AST 48* 43* 38  39  --    MG 2.4 2.4 2.4 2.4   PHOS 6.5* 7.4* 7.0* 7.0*         Significant Imaging:  I have reviewed all pertinent imaging results/findings within the past 24 hours.

## 2023-12-04 NOTE — PROGRESS NOTES
Pt going for chest closure. Tachy therapy disabled at bedside. Call device clinic after surgery for device reprogramming @ [phone].

## 2023-12-05 PROBLEM — Z91.89 AT HIGH RISK FOR SKIN BREAKDOWN: Status: ACTIVE | Noted: 2023-12-05

## 2023-12-05 PROBLEM — N18.32 ACUTE RENAL FAILURE SUPERIMPOSED ON STAGE 3B CHRONIC KIDNEY DISEASE: Status: ACTIVE | Noted: 2023-10-08

## 2023-12-05 PROBLEM — N17.0 ACUTE RENAL FAILURE WITH ACUTE TUBULAR NECROSIS SUPERIMPOSED ON STAGE 3B CHRONIC KIDNEY DISEASE: Status: ACTIVE | Noted: 2023-10-08

## 2023-12-05 LAB
ALBUMIN SERPL BCP-MCNC: 2.1 G/DL (ref 3.5–5.2)
ALBUMIN SERPL BCP-MCNC: 2.5 G/DL (ref 3.5–5.2)
ALBUMIN SERPL BCP-MCNC: 2.5 G/DL (ref 3.5–5.2)
ALLENS TEST: ABNORMAL
ALLENS TEST: NORMAL
ALP SERPL-CCNC: 116 U/L (ref 55–135)
ALP SERPL-CCNC: 87 U/L (ref 55–135)
ALP SERPL-CCNC: 90 U/L (ref 55–135)
ALT SERPL W/O P-5'-P-CCNC: 24 U/L (ref 10–44)
ALT SERPL W/O P-5'-P-CCNC: 24 U/L (ref 10–44)
ALT SERPL W/O P-5'-P-CCNC: 30 U/L (ref 10–44)
ANION GAP SERPL CALC-SCNC: 16 MMOL/L (ref 8–16)
ANION GAP SERPL CALC-SCNC: 16 MMOL/L (ref 8–16)
ANION GAP SERPL CALC-SCNC: 17 MMOL/L (ref 8–16)
APTT PPP: 26.4 SEC (ref 21–32)
APTT PPP: 26.6 SEC (ref 21–32)
APTT PPP: 26.7 SEC (ref 21–32)
APTT PPP: 29.6 SEC (ref 21–32)
AST SERPL-CCNC: 37 U/L (ref 10–40)
AST SERPL-CCNC: 38 U/L (ref 10–40)
AST SERPL-CCNC: 39 U/L (ref 10–40)
BASOPHILS # BLD AUTO: 0.02 K/UL (ref 0–0.2)
BASOPHILS # BLD AUTO: 0.03 K/UL (ref 0–0.2)
BASOPHILS # BLD AUTO: 0.03 K/UL (ref 0–0.2)
BASOPHILS NFR BLD: 0.1 % (ref 0–1.9)
BASOPHILS NFR BLD: 0.2 % (ref 0–1.9)
BASOPHILS NFR BLD: 0.2 % (ref 0–1.9)
BILIRUB SERPL-MCNC: 0.9 MG/DL (ref 0.1–1)
BILIRUB SERPL-MCNC: 1 MG/DL (ref 0.1–1)
BILIRUB SERPL-MCNC: 1.1 MG/DL (ref 0.1–1)
BIPAP: 0
BLD PROD TYP BPU: NORMAL
BLD PROD TYP BPU: NORMAL
BLOOD UNIT EXPIRATION DATE: NORMAL
BLOOD UNIT EXPIRATION DATE: NORMAL
BLOOD UNIT TYPE CODE: 6200
BLOOD UNIT TYPE CODE: 6200
BLOOD UNIT TYPE: NORMAL
BLOOD UNIT TYPE: NORMAL
BUN SERPL-MCNC: 69 MG/DL (ref 8–23)
BUN SERPL-MCNC: 69 MG/DL (ref 8–23)
BUN SERPL-MCNC: 72 MG/DL (ref 8–23)
CALCIUM SERPL-MCNC: 8.4 MG/DL (ref 8.7–10.5)
CALCIUM SERPL-MCNC: 8.5 MG/DL (ref 8.7–10.5)
CALCIUM SERPL-MCNC: 8.6 MG/DL (ref 8.7–10.5)
CHLORIDE SERPL-SCNC: 102 MMOL/L (ref 95–110)
CHLORIDE SERPL-SCNC: 103 MMOL/L (ref 95–110)
CHLORIDE SERPL-SCNC: 103 MMOL/L (ref 95–110)
CO2 SERPL-SCNC: 16 MMOL/L (ref 23–29)
CO2 SERPL-SCNC: 17 MMOL/L (ref 23–29)
CO2 SERPL-SCNC: 18 MMOL/L (ref 23–29)
CODING SYSTEM: NORMAL
CODING SYSTEM: NORMAL
CREAT SERPL-MCNC: 3.1 MG/DL (ref 0.5–1.4)
CREAT SERPL-MCNC: 3.3 MG/DL (ref 0.5–1.4)
CREAT SERPL-MCNC: 3.5 MG/DL (ref 0.5–1.4)
CROSSMATCH INTERPRETATION: NORMAL
CROSSMATCH INTERPRETATION: NORMAL
DELSYS: ABNORMAL
DELSYS: NORMAL
DIFFERENTIAL METHOD BLD: ABNORMAL
DISPENSE STATUS: NORMAL
DISPENSE STATUS: NORMAL
EOSINOPHIL # BLD AUTO: 0 K/UL (ref 0–0.5)
EOSINOPHIL NFR BLD: 0 % (ref 0–8)
EOSINOPHIL NFR BLD: 0.1 % (ref 0–8)
EOSINOPHIL NFR BLD: 0.1 % (ref 0–8)
ERYTHROCYTE [DISTWIDTH] IN BLOOD BY AUTOMATED COUNT: 15.3 % (ref 11.5–14.5)
ERYTHROCYTE [DISTWIDTH] IN BLOOD BY AUTOMATED COUNT: 15.4 % (ref 11.5–14.5)
ERYTHROCYTE [DISTWIDTH] IN BLOOD BY AUTOMATED COUNT: 15.6 % (ref 11.5–14.5)
ERYTHROCYTE [SEDIMENTATION RATE] IN BLOOD BY WESTERGREN METHOD: 16 MM/H
ERYTHROCYTE [SEDIMENTATION RATE] IN BLOOD BY WESTERGREN METHOD: 18 MM/H
ERYTHROCYTE [SEDIMENTATION RATE] IN BLOOD BY WESTERGREN METHOD: 31 MM/H
ERYTHROCYTE [SEDIMENTATION RATE] IN BLOOD BY WESTERGREN METHOD: 40 MM/H
EST. GFR  (NO RACE VARIABLE): 19 ML/MIN/1.73 M^2
EST. GFR  (NO RACE VARIABLE): 20.4 ML/MIN/1.73 M^2
EST. GFR  (NO RACE VARIABLE): 22 ML/MIN/1.73 M^2
ETCO2: 0
FIBRINOGEN PPP-MCNC: 640 MG/DL (ref 182–400)
FIBRINOGEN PPP-MCNC: 706 MG/DL (ref 182–400)
FIBRINOGEN PPP-MCNC: >800 MG/DL (ref 182–400)
FINAL PATHOLOGIC DIAGNOSIS: NORMAL
FIO2: 40
FIO2: 40 %
FLOW: 10
FLOW: 4
FLOW: 4
GLUCOSE SERPL-MCNC: 177 MG/DL (ref 70–110)
GLUCOSE SERPL-MCNC: 205 MG/DL (ref 70–110)
GLUCOSE SERPL-MCNC: 210 MG/DL (ref 70–110)
GROSS: NORMAL
HCO3 UR-SCNC: 15.1 MMOL/L (ref 24–28)
HCO3 UR-SCNC: 15.5 MMOL/L (ref 24–28)
HCO3 UR-SCNC: 16.1 MMOL/L (ref 24–28)
HCO3 UR-SCNC: 17 MMOL/L (ref 24–28)
HCO3 UR-SCNC: 17.6 MMOL/L (ref 24–28)
HCO3 UR-SCNC: 17.7 MMOL/L (ref 24–28)
HCO3 UR-SCNC: 17.9 MMOL/L (ref 24–28)
HCO3 UR-SCNC: 18.5 MMOL/L (ref 24–28)
HCO3 UR-SCNC: 18.7 MMOL/L (ref 24–28)
HCO3 UR-SCNC: 18.8 MMOL/L (ref 24–28)
HCO3 UR-SCNC: 18.9 MMOL/L (ref 24–28)
HCO3 UR-SCNC: 19.1 MMOL/L (ref 24–28)
HCT VFR BLD AUTO: 21.2 % (ref 40–54)
HCT VFR BLD AUTO: 21.5 % (ref 40–54)
HCT VFR BLD AUTO: 23.4 % (ref 40–54)
HCT VFR BLD CALC: 19 %PCV (ref 36–54)
HCT VFR BLD CALC: 20 %PCV (ref 36–54)
HCT VFR BLD CALC: 21 %PCV (ref 36–54)
HCT VFR BLD CALC: 21 %PCV (ref 36–54)
HCT VFR BLD CALC: 22 %PCV (ref 36–54)
HCT VFR BLD CALC: 23 %PCV (ref 36–54)
HCT VFR BLD CALC: 23 %PCV (ref 36–54)
HCT VFR BLD CALC: 25 %PCV (ref 36–54)
HCT VFR BLD CALC: 30 %PCV (ref 36–54)
HGB BLD-MCNC: 7.3 G/DL (ref 14–18)
HGB BLD-MCNC: 7.4 G/DL (ref 14–18)
HGB BLD-MCNC: 8.1 G/DL (ref 14–18)
IMM GRANULOCYTES # BLD AUTO: 0.1 K/UL (ref 0–0.04)
IMM GRANULOCYTES # BLD AUTO: 0.11 K/UL (ref 0–0.04)
IMM GRANULOCYTES # BLD AUTO: 0.16 K/UL (ref 0–0.04)
IMM GRANULOCYTES NFR BLD AUTO: 0.7 % (ref 0–0.5)
IMM GRANULOCYTES NFR BLD AUTO: 0.8 % (ref 0–0.5)
IMM GRANULOCYTES NFR BLD AUTO: 1.1 % (ref 0–0.5)
INR PPP: 1 (ref 0.8–1.2)
INR PPP: 1.1 (ref 0.8–1.2)
LDH SERPL L TO P-CCNC: 0.37 MMOL/L (ref 0.36–1.25)
LDH SERPL L TO P-CCNC: 0.38 MMOL/L (ref 0.36–1.25)
LDH SERPL L TO P-CCNC: 0.41 MMOL/L (ref 0.36–1.25)
LDH SERPL L TO P-CCNC: 0.42 MMOL/L (ref 0.36–1.25)
LDH SERPL L TO P-CCNC: 0.47 MMOL/L (ref 0.36–1.25)
LDH SERPL L TO P-CCNC: 0.54 MMOL/L (ref 0.36–1.25)
LDH SERPL L TO P-CCNC: 0.56 MMOL/L (ref 0.36–1.25)
LDH SERPL L TO P-CCNC: 0.66 MMOL/L (ref 0.36–1.25)
LDH SERPL L TO P-CCNC: 0.71 MMOL/L (ref 0.36–1.25)
LDH SERPL L TO P-CCNC: 0.79 MMOL/L (ref 0.36–1.25)
LDH SERPL L TO P-CCNC: 511 U/L (ref 110–260)
LYMPHOCYTES # BLD AUTO: 0.2 K/UL (ref 1–4.8)
LYMPHOCYTES # BLD AUTO: 0.4 K/UL (ref 1–4.8)
LYMPHOCYTES # BLD AUTO: 0.4 K/UL (ref 1–4.8)
LYMPHOCYTES NFR BLD: 1.2 % (ref 18–48)
LYMPHOCYTES NFR BLD: 2.5 % (ref 18–48)
LYMPHOCYTES NFR BLD: 2.6 % (ref 18–48)
Lab: NORMAL
MAGNESIUM SERPL-MCNC: 2.3 MG/DL (ref 1.6–2.6)
MAGNESIUM SERPL-MCNC: 2.4 MG/DL (ref 1.6–2.6)
MAGNESIUM SERPL-MCNC: 2.4 MG/DL (ref 1.6–2.6)
MAGNESIUM SERPL-MCNC: 2.6 MG/DL (ref 1.6–2.6)
MCH RBC QN AUTO: 29.3 PG (ref 27–31)
MCH RBC QN AUTO: 29.6 PG (ref 27–31)
MCH RBC QN AUTO: 29.6 PG (ref 27–31)
MCHC RBC AUTO-ENTMCNC: 34.4 G/DL (ref 32–36)
MCHC RBC AUTO-ENTMCNC: 34.4 G/DL (ref 32–36)
MCHC RBC AUTO-ENTMCNC: 34.6 G/DL (ref 32–36)
MCV RBC AUTO: 85 FL (ref 82–98)
MCV RBC AUTO: 86 FL (ref 82–98)
MCV RBC AUTO: 86 FL (ref 82–98)
METHEMOGLOBIN: 1.9 % (ref 0–3)
MIN VOL: 10
MIN VOL: 11
MIN VOL: 11.8
MIN VOL: 11.8
MIN VOL: 8
MIN VOL: 8
MIN VOL: 8.78
MIN VOL: 9
MIN VOL: 9
MIN VOL: 9.16
MIN VOL: 9.26
MODE: ABNORMAL
MODE: NORMAL
MONOCYTES # BLD AUTO: 0.4 K/UL (ref 0.3–1)
MONOCYTES # BLD AUTO: 0.9 K/UL (ref 0.3–1)
MONOCYTES # BLD AUTO: 1 K/UL (ref 0.3–1)
MONOCYTES NFR BLD: 3 % (ref 4–15)
MONOCYTES NFR BLD: 6.8 % (ref 4–15)
MONOCYTES NFR BLD: 7.1 % (ref 4–15)
NEUTROPHILS # BLD AUTO: 12 K/UL (ref 1.8–7.7)
NEUTROPHILS # BLD AUTO: 12.5 K/UL (ref 1.8–7.7)
NEUTROPHILS # BLD AUTO: 13.9 K/UL (ref 1.8–7.7)
NEUTROPHILS NFR BLD: 89.4 % (ref 38–73)
NEUTROPHILS NFR BLD: 89.6 % (ref 38–73)
NEUTROPHILS NFR BLD: 94.5 % (ref 38–73)
NRBC BLD-RTO: 0 /100 WBC
NUM UNITS TRANS FFP: NORMAL
NUM UNITS TRANS FFP: NORMAL
PCO2 BLDA: 22.9 MMHG (ref 35–45)
PCO2 BLDA: 25.3 MMHG (ref 35–45)
PCO2 BLDA: 25.7 MMHG (ref 35–45)
PCO2 BLDA: 25.8 MMHG (ref 35–45)
PCO2 BLDA: 26 MMHG (ref 35–45)
PCO2 BLDA: 27.1 MMHG (ref 35–45)
PCO2 BLDA: 27.2 MMHG (ref 35–45)
PCO2 BLDA: 27.5 MMHG (ref 35–45)
PCO2 BLDA: 28.6 MMHG (ref 35–45)
PCO2 BLDA: 28.8 MMHG (ref 35–45)
PCO2 BLDA: 28.8 MMHG (ref 35–45)
PCO2 BLDA: 30.2 MMHG (ref 35–45)
PEEP: 5
PH SMN: 7.39 [PH] (ref 7.35–7.45)
PH SMN: 7.4 [PH] (ref 7.35–7.45)
PH SMN: 7.4 [PH] (ref 7.35–7.45)
PH SMN: 7.41 [PH] (ref 7.35–7.45)
PH SMN: 7.42 [PH] (ref 7.35–7.45)
PH SMN: 7.43 [PH] (ref 7.35–7.45)
PH SMN: 7.43 [PH] (ref 7.35–7.45)
PH SMN: 7.44 [PH] (ref 7.35–7.45)
PH SMN: 7.45 [PH] (ref 7.35–7.45)
PH SMN: 7.45 [PH] (ref 7.35–7.45)
PHOSPHATE SERPL-MCNC: 6.9 MG/DL (ref 2.7–4.5)
PHOSPHATE SERPL-MCNC: 7.3 MG/DL (ref 2.7–4.5)
PHOSPHATE SERPL-MCNC: 7.5 MG/DL (ref 2.7–4.5)
PIP: 21
PIP: 21
PIP: 22
PIP: 22
PIP: 24
PIP: 29
PLATELET # BLD AUTO: 181 K/UL (ref 150–450)
PLATELET # BLD AUTO: 188 K/UL (ref 150–450)
PLATELET # BLD AUTO: 199 K/UL (ref 150–450)
PMV BLD AUTO: 10.9 FL (ref 9.2–12.9)
PMV BLD AUTO: 10.9 FL (ref 9.2–12.9)
PMV BLD AUTO: 11.7 FL (ref 9.2–12.9)
PO2 BLDA: 104 MMHG (ref 80–100)
PO2 BLDA: 113 MMHG (ref 80–100)
PO2 BLDA: 117 MMHG (ref 80–100)
PO2 BLDA: 118 MMHG (ref 80–100)
PO2 BLDA: 24 MMHG (ref 40–60)
PO2 BLDA: 29 MMHG (ref 17–41)
PO2 BLDA: 48 MMHG (ref 80–100)
PO2 BLDA: 63 MMHG (ref 80–100)
PO2 BLDA: 75 MMHG (ref 80–100)
PO2 BLDA: 80 MMHG (ref 80–100)
PO2 BLDA: 83 MMHG (ref 80–100)
PO2 BLDA: 84 MMHG (ref 80–100)
PO2 BLDA: 90 MMHG (ref 80–100)
POC BE: -5 MMOL/L
POC BE: -5 MMOL/L
POC BE: -6 MMOL/L
POC BE: -7 MMOL/L
POC BE: -9 MMOL/L
POC IONIZED CALCIUM: 1.03 MMOL/L (ref 1.06–1.42)
POC IONIZED CALCIUM: 1.05 MMOL/L (ref 1.06–1.42)
POC IONIZED CALCIUM: 1.06 MMOL/L (ref 1.06–1.42)
POC IONIZED CALCIUM: 1.06 MMOL/L (ref 1.06–1.42)
POC IONIZED CALCIUM: 1.08 MMOL/L (ref 1.06–1.42)
POC IONIZED CALCIUM: 1.08 MMOL/L (ref 1.06–1.42)
POC METHB: 1.9 % (ref 0–3)
POC PERFORMED BY: NORMAL
POC SATURATED O2: 45 % (ref 95–100)
POC SATURATED O2: 53 %
POC SATURATED O2: 86 % (ref 95–100)
POC SATURATED O2: 93 % (ref 95–100)
POC SATURATED O2: 96 % (ref 95–100)
POC SATURATED O2: 97 % (ref 95–100)
POC SATURATED O2: 97 % (ref 95–100)
POC SATURATED O2: 98 % (ref 95–100)
POC SATURATED O2: 99 % (ref 95–100)
POC TCO2: 16 MMOL/L (ref 23–27)
POC TCO2: 16 MMOL/L (ref 23–27)
POC TCO2: 17 MMOL/L (ref 23–27)
POC TCO2: 18 MMOL/L (ref 23–27)
POC TCO2: 18 MMOL/L (ref 23–27)
POC TCO2: 19 MMOL/L (ref 23–27)
POC TCO2: 20 MMOL/L (ref 23–27)
POC TCO2: 20 MMOL/L (ref 24–29)
POC TEMPERATURE: 37 C
POCT GLUCOSE: 117 MG/DL (ref 70–110)
POCT GLUCOSE: 174 MG/DL (ref 70–110)
POCT GLUCOSE: 181 MG/DL (ref 70–110)
POCT GLUCOSE: 214 MG/DL (ref 70–110)
POCT GLUCOSE: 230 MG/DL (ref 70–110)
POCT GLUCOSE: 237 MG/DL (ref 70–110)
POCT GLUCOSE: 262 MG/DL (ref 70–110)
POTASSIUM BLD-SCNC: 3.6 MMOL/L (ref 3.5–5.1)
POTASSIUM BLD-SCNC: 3.7 MMOL/L (ref 3.5–5.1)
POTASSIUM BLD-SCNC: 3.7 MMOL/L (ref 3.5–5.1)
POTASSIUM BLD-SCNC: 3.8 MMOL/L (ref 3.5–5.1)
POTASSIUM BLD-SCNC: 3.9 MMOL/L (ref 3.5–5.1)
POTASSIUM BLD-SCNC: 4 MMOL/L (ref 3.5–5.1)
POTASSIUM SERPL-SCNC: 3.8 MMOL/L (ref 3.5–5.1)
POTASSIUM SERPL-SCNC: 3.9 MMOL/L (ref 3.5–5.1)
POTASSIUM SERPL-SCNC: 4 MMOL/L (ref 3.5–5.1)
POTASSIUM SERPL-SCNC: 4 MMOL/L (ref 3.5–5.1)
PROT SERPL-MCNC: 5.9 G/DL (ref 6–8.4)
PROT SERPL-MCNC: 6 G/DL (ref 6–8.4)
PROT SERPL-MCNC: 6 G/DL (ref 6–8.4)
PROTHROMBIN TIME: 11.1 SEC (ref 9–12.5)
PROTHROMBIN TIME: 11.3 SEC (ref 9–12.5)
PROTHROMBIN TIME: 11.4 SEC (ref 9–12.5)
PROTHROMBIN TIME: 11.7 SEC (ref 9–12.5)
PS: 5
RBC # BLD AUTO: 2.47 M/UL (ref 4.6–6.2)
RBC # BLD AUTO: 2.5 M/UL (ref 4.6–6.2)
RBC # BLD AUTO: 2.76 M/UL (ref 4.6–6.2)
SAMPLE: ABNORMAL
SAMPLE: NORMAL
SITE: ABNORMAL
SITE: NORMAL
SODIUM BLD-SCNC: 135 MMOL/L (ref 136–145)
SODIUM BLD-SCNC: 136 MMOL/L (ref 136–145)
SODIUM BLD-SCNC: 137 MMOL/L (ref 136–145)
SODIUM BLD-SCNC: 137 MMOL/L (ref 136–145)
SODIUM SERPL-SCNC: 135 MMOL/L (ref 136–145)
SODIUM SERPL-SCNC: 136 MMOL/L (ref 136–145)
SODIUM SERPL-SCNC: 137 MMOL/L (ref 136–145)
SP02: 100
SP02: 92
SP02: 92
SP02: 96
SP02: 96
SP02: 97
SP02: 97
SP02: 98
SP02: 99
SPECIMEN SOURCE: NORMAL
SPONT RATE: 23
SPONT RATE: 26
SPONT RATE: ABNORMAL
VT: 490
WBC # BLD AUTO: 13.35 K/UL (ref 3.9–12.7)
WBC # BLD AUTO: 13.99 K/UL (ref 3.9–12.7)
WBC # BLD AUTO: 14.73 K/UL (ref 3.9–12.7)

## 2023-12-05 PROCEDURE — 80053 COMPREHEN METABOLIC PANEL: CPT | Mod: 91 | Performed by: THORACIC SURGERY (CARDIOTHORACIC VASCULAR SURGERY)

## 2023-12-05 PROCEDURE — 37799 UNLISTED PX VASCULAR SURGERY: CPT

## 2023-12-05 PROCEDURE — C1751 CATH, INF, PER/CENT/MIDLINE: HCPCS

## 2023-12-05 PROCEDURE — 90945 DIALYSIS ONE EVALUATION: CPT

## 2023-12-05 PROCEDURE — 25000003 PHARM REV CODE 250: Performed by: NURSE PRACTITIONER

## 2023-12-05 PROCEDURE — 82803 BLOOD GASES ANY COMBINATION: CPT

## 2023-12-05 PROCEDURE — 85025 COMPLETE CBC W/AUTO DIFF WBC: CPT

## 2023-12-05 PROCEDURE — 83615 LACTATE (LD) (LDH) ENZYME: CPT | Performed by: STUDENT IN AN ORGANIZED HEALTH CARE EDUCATION/TRAINING PROGRAM

## 2023-12-05 PROCEDURE — 63600175 PHARM REV CODE 636 W HCPCS: Performed by: NURSE PRACTITIONER

## 2023-12-05 PROCEDURE — 94010 BREATHING CAPACITY TEST: CPT

## 2023-12-05 PROCEDURE — 83050 HGB METHEMOGLOBIN QUAN: CPT

## 2023-12-05 PROCEDURE — 63600175 PHARM REV CODE 636 W HCPCS

## 2023-12-05 PROCEDURE — 99291 CRITICAL CARE FIRST HOUR: CPT | Mod: ,,, | Performed by: REGISTERED NURSE

## 2023-12-05 PROCEDURE — 84132 ASSAY OF SERUM POTASSIUM: CPT | Performed by: THORACIC SURGERY (CARDIOTHORACIC VASCULAR SURGERY)

## 2023-12-05 PROCEDURE — 85730 THROMBOPLASTIN TIME PARTIAL: CPT | Performed by: STUDENT IN AN ORGANIZED HEALTH CARE EDUCATION/TRAINING PROGRAM

## 2023-12-05 PROCEDURE — 83735 ASSAY OF MAGNESIUM: CPT | Performed by: THORACIC SURGERY (CARDIOTHORACIC VASCULAR SURGERY)

## 2023-12-05 PROCEDURE — 25000003 PHARM REV CODE 250: Performed by: STUDENT IN AN ORGANIZED HEALTH CARE EDUCATION/TRAINING PROGRAM

## 2023-12-05 PROCEDURE — 84100 ASSAY OF PHOSPHORUS: CPT | Mod: 91 | Performed by: THORACIC SURGERY (CARDIOTHORACIC VASCULAR SURGERY)

## 2023-12-05 PROCEDURE — 63600175 PHARM REV CODE 636 W HCPCS: Mod: JZ,JG | Performed by: STUDENT IN AN ORGANIZED HEALTH CARE EDUCATION/TRAINING PROGRAM

## 2023-12-05 PROCEDURE — 27200966 HC CLOSED SUCTION SYSTEM

## 2023-12-05 PROCEDURE — 85384 FIBRINOGEN ACTIVITY: CPT | Performed by: THORACIC SURGERY (CARDIOTHORACIC VASCULAR SURGERY)

## 2023-12-05 PROCEDURE — 99900035 HC TECH TIME PER 15 MIN (STAT)

## 2023-12-05 PROCEDURE — 80053 COMPREHEN METABOLIC PANEL: CPT

## 2023-12-05 PROCEDURE — 85730 THROMBOPLASTIN TIME PARTIAL: CPT | Mod: 91 | Performed by: THORACIC SURGERY (CARDIOTHORACIC VASCULAR SURGERY)

## 2023-12-05 PROCEDURE — 76937 US GUIDE VASCULAR ACCESS: CPT

## 2023-12-05 PROCEDURE — 94003 VENT MGMT INPAT SUBQ DAY: CPT

## 2023-12-05 PROCEDURE — 99900026 HC AIRWAY MAINTENANCE (STAT)

## 2023-12-05 PROCEDURE — 85384 FIBRINOGEN ACTIVITY: CPT | Mod: 91 | Performed by: THORACIC SURGERY (CARDIOTHORACIC VASCULAR SURGERY)

## 2023-12-05 PROCEDURE — 25000003 PHARM REV CODE 250: Performed by: THORACIC SURGERY (CARDIOTHORACIC VASCULAR SURGERY)

## 2023-12-05 PROCEDURE — 85610 PROTHROMBIN TIME: CPT | Mod: 91 | Performed by: THORACIC SURGERY (CARDIOTHORACIC VASCULAR SURGERY)

## 2023-12-05 PROCEDURE — 94150 VITAL CAPACITY TEST: CPT

## 2023-12-05 PROCEDURE — 80069 RENAL FUNCTION PANEL: CPT | Performed by: STUDENT IN AN ORGANIZED HEALTH CARE EDUCATION/TRAINING PROGRAM

## 2023-12-05 PROCEDURE — 84132 ASSAY OF SERUM POTASSIUM: CPT

## 2023-12-05 PROCEDURE — 94761 N-INVAS EAR/PLS OXIMETRY MLT: CPT | Mod: XB

## 2023-12-05 PROCEDURE — 85014 HEMATOCRIT: CPT

## 2023-12-05 PROCEDURE — 83605 ASSAY OF LACTIC ACID: CPT

## 2023-12-05 PROCEDURE — 99291 CRITICAL CARE FIRST HOUR: CPT | Mod: ,,, | Performed by: ANESTHESIOLOGY

## 2023-12-05 PROCEDURE — 63600175 PHARM REV CODE 636 W HCPCS: Performed by: THORACIC SURGERY (CARDIOTHORACIC VASCULAR SURGERY)

## 2023-12-05 PROCEDURE — 93750 INTERROGATION VAD IN PERSON: CPT | Mod: ,,, | Performed by: INTERNAL MEDICINE

## 2023-12-05 PROCEDURE — 83735 ASSAY OF MAGNESIUM: CPT | Mod: 91 | Performed by: THORACIC SURGERY (CARDIOTHORACIC VASCULAR SURGERY)

## 2023-12-05 PROCEDURE — 63600175 PHARM REV CODE 636 W HCPCS: Performed by: STUDENT IN AN ORGANIZED HEALTH CARE EDUCATION/TRAINING PROGRAM

## 2023-12-05 PROCEDURE — 99232 SBSQ HOSP IP/OBS MODERATE 35: CPT | Mod: ,,,

## 2023-12-05 PROCEDURE — 82330 ASSAY OF CALCIUM: CPT

## 2023-12-05 PROCEDURE — 85610 PROTHROMBIN TIME: CPT | Performed by: STUDENT IN AN ORGANIZED HEALTH CARE EDUCATION/TRAINING PROGRAM

## 2023-12-05 PROCEDURE — 85025 COMPLETE CBC W/AUTO DIFF WBC: CPT | Mod: 91 | Performed by: THORACIC SURGERY (CARDIOTHORACIC VASCULAR SURGERY)

## 2023-12-05 PROCEDURE — 84295 ASSAY OF SERUM SODIUM: CPT

## 2023-12-05 PROCEDURE — 83735 ASSAY OF MAGNESIUM: CPT | Mod: 91 | Performed by: STUDENT IN AN ORGANIZED HEALTH CARE EDUCATION/TRAINING PROGRAM

## 2023-12-05 PROCEDURE — 31720 CLEARANCE OF AIRWAYS: CPT

## 2023-12-05 PROCEDURE — 25000003 PHARM REV CODE 250

## 2023-12-05 PROCEDURE — 99223 1ST HOSP IP/OBS HIGH 75: CPT | Mod: ,,, | Performed by: INTERNAL MEDICINE

## 2023-12-05 PROCEDURE — A4216 STERILE WATER/SALINE, 10 ML: HCPCS | Performed by: THORACIC SURGERY (CARDIOTHORACIC VASCULAR SURGERY)

## 2023-12-05 PROCEDURE — P9045 ALBUMIN (HUMAN), 5%, 250 ML: HCPCS | Mod: JZ,JG | Performed by: THORACIC SURGERY (CARDIOTHORACIC VASCULAR SURGERY)

## 2023-12-05 PROCEDURE — 20000000 HC ICU ROOM

## 2023-12-05 PROCEDURE — 84100 ASSAY OF PHOSPHORUS: CPT | Performed by: THORACIC SURGERY (CARDIOTHORACIC VASCULAR SURGERY)

## 2023-12-05 PROCEDURE — 27100171 HC OXYGEN HIGH FLOW UP TO 24 HOURS

## 2023-12-05 PROCEDURE — 36573 INSJ PICC RS&I 5 YR+: CPT

## 2023-12-05 PROCEDURE — C9113 INJ PANTOPRAZOLE SODIUM, VIA: HCPCS

## 2023-12-05 PROCEDURE — 27000248 HC VAD-ADDITIONAL DAY

## 2023-12-05 PROCEDURE — 99222 1ST HOSP IP/OBS MODERATE 55: CPT | Mod: ,,, | Performed by: NURSE PRACTITIONER

## 2023-12-05 PROCEDURE — 63600367 HC NITRIC OXIDE PER HOUR

## 2023-12-05 RX ORDER — DEXMEDETOMIDINE HYDROCHLORIDE 4 UG/ML
0-1.4 INJECTION, SOLUTION INTRAVENOUS CONTINUOUS
Status: DISCONTINUED | OUTPATIENT
Start: 2023-12-05 | End: 2023-12-05

## 2023-12-05 RX ORDER — HEPARIN SODIUM 10000 [USP'U]/100ML
400 INJECTION, SOLUTION INTRAVENOUS CONTINUOUS
Status: DISCONTINUED | OUTPATIENT
Start: 2023-12-05 | End: 2023-12-06

## 2023-12-05 RX ORDER — INDOMETHACIN 25 MG/1
CAPSULE ORAL
Status: COMPLETED
Start: 2023-12-05 | End: 2023-12-05

## 2023-12-05 RX ORDER — SODIUM CHLORIDE 0.9 % (FLUSH) 0.9 %
10 SYRINGE (ML) INJECTION EVERY 6 HOURS
Status: DISCONTINUED | OUTPATIENT
Start: 2023-12-05 | End: 2023-12-06

## 2023-12-05 RX ORDER — MAGNESIUM SULFATE HEPTAHYDRATE 40 MG/ML
2 INJECTION, SOLUTION INTRAVENOUS
Status: DISCONTINUED | OUTPATIENT
Start: 2023-12-05 | End: 2023-12-06

## 2023-12-05 RX ORDER — ALBUMIN HUMAN 50 G/1000ML
25 SOLUTION INTRAVENOUS ONCE
Status: COMPLETED | OUTPATIENT
Start: 2023-12-05 | End: 2023-12-05

## 2023-12-05 RX ORDER — SODIUM CHLORIDE 0.9 % (FLUSH) 0.9 %
10 SYRINGE (ML) INJECTION
Status: DISCONTINUED | OUTPATIENT
Start: 2023-12-05 | End: 2024-01-24 | Stop reason: HOSPADM

## 2023-12-05 RX ORDER — INDOMETHACIN 25 MG/1
50 CAPSULE ORAL ONCE
Status: COMPLETED | OUTPATIENT
Start: 2023-12-05 | End: 2023-12-05

## 2023-12-05 RX ORDER — DEXMEDETOMIDINE HYDROCHLORIDE 4 UG/ML
INJECTION, SOLUTION INTRAVENOUS
Status: COMPLETED
Start: 2023-12-05 | End: 2023-12-05

## 2023-12-05 RX ORDER — QUETIAPINE FUMARATE 25 MG/1
50 TABLET, FILM COATED ORAL EVERY 6 HOURS PRN
Status: DISCONTINUED | OUTPATIENT
Start: 2023-12-05 | End: 2023-12-06

## 2023-12-05 RX ORDER — FUROSEMIDE 10 MG/ML
40 INJECTION INTRAMUSCULAR; INTRAVENOUS ONCE
Status: COMPLETED | OUTPATIENT
Start: 2023-12-05 | End: 2023-12-05

## 2023-12-05 RX ORDER — HYDROCODONE BITARTRATE AND ACETAMINOPHEN 500; 5 MG/1; MG/1
TABLET ORAL CONTINUOUS
Status: DISCONTINUED | OUTPATIENT
Start: 2023-12-05 | End: 2023-12-06

## 2023-12-05 RX ORDER — QUETIAPINE FUMARATE 25 MG/1
50 TABLET, FILM COATED ORAL DAILY
Status: DISCONTINUED | OUTPATIENT
Start: 2023-12-05 | End: 2023-12-05

## 2023-12-05 RX ADMIN — QUETIAPINE FUMARATE 50 MG: 25 TABLET ORAL at 11:12

## 2023-12-05 RX ADMIN — SODIUM CHLORIDE 7.5 MG/HR: 9 INJECTION, SOLUTION INTRAVENOUS at 01:12

## 2023-12-05 RX ADMIN — PROPOFOL 35 MCG/KG/MIN: 10 INJECTION, EMULSION INTRAVENOUS at 04:12

## 2023-12-05 RX ADMIN — OXYCODONE HYDROCHLORIDE 5 MG: 5 TABLET ORAL at 07:12

## 2023-12-05 RX ADMIN — INSULIN ASPART 2 UNITS: 100 INJECTION, SOLUTION INTRAVENOUS; SUBCUTANEOUS at 08:12

## 2023-12-05 RX ADMIN — HYDROMORPHONE HYDROCHLORIDE 0.5 MG: 0.5 INJECTION, SOLUTION INTRAMUSCULAR; INTRAVENOUS; SUBCUTANEOUS at 10:12

## 2023-12-05 RX ADMIN — FENTANYL CITRATE 25 MCG: 50 INJECTION INTRAMUSCULAR; INTRAVENOUS at 08:12

## 2023-12-05 RX ADMIN — DOBUTAMINE IN DEXTROSE 5 MCG/KG/MIN: 400 INJECTION, SOLUTION INTRAVENOUS at 04:12

## 2023-12-05 RX ADMIN — ACETAMINOPHEN 650 MG: 325 TABLET ORAL at 07:12

## 2023-12-05 RX ADMIN — OXYCODONE HYDROCHLORIDE 5 MG: 5 TABLET ORAL at 06:12

## 2023-12-05 RX ADMIN — AMIODARONE HYDROCHLORIDE 400 MG: 200 TABLET ORAL at 08:12

## 2023-12-05 RX ADMIN — DEXMEDETOMIDINE HYDROCHLORIDE 0.4 MCG/KG/HR: 4 INJECTION, SOLUTION INTRAVENOUS at 06:12

## 2023-12-05 RX ADMIN — HYDROMORPHONE HYDROCHLORIDE 0.5 MG: 0.5 INJECTION, SOLUTION INTRAMUSCULAR; INTRAVENOUS; SUBCUTANEOUS at 06:12

## 2023-12-05 RX ADMIN — MUPIROCIN: 20 OINTMENT TOPICAL at 08:12

## 2023-12-05 RX ADMIN — PANTOPRAZOLE SODIUM 40 MG: 40 INJECTION, POWDER, FOR SOLUTION INTRAVENOUS at 08:12

## 2023-12-05 RX ADMIN — INSULIN ASPART 2 UNITS: 100 INJECTION, SOLUTION INTRAVENOUS; SUBCUTANEOUS at 12:12

## 2023-12-05 RX ADMIN — Medication 10 ML: at 06:12

## 2023-12-05 RX ADMIN — SENNOSIDES AND DOCUSATE SODIUM 1 TABLET: 8.6; 5 TABLET ORAL at 08:12

## 2023-12-05 RX ADMIN — INSULIN ASPART 1 UNITS: 100 INJECTION, SOLUTION INTRAVENOUS; SUBCUTANEOUS at 06:12

## 2023-12-05 RX ADMIN — Medication 10 ML: at 11:12

## 2023-12-05 RX ADMIN — QUETIAPINE FUMARATE 50 MG: 25 TABLET ORAL at 05:12

## 2023-12-05 RX ADMIN — POLYETHYLENE GLYCOL 3350 17 G: 17 POWDER, FOR SOLUTION ORAL at 08:12

## 2023-12-05 RX ADMIN — FUROSEMIDE 40 MG: 10 INJECTION, SOLUTION INTRAMUSCULAR; INTRAVENOUS at 05:12

## 2023-12-05 RX ADMIN — INSULIN HUMAN 1 UNITS/HR: 1 INJECTION, SOLUTION INTRAVENOUS at 04:12

## 2023-12-05 RX ADMIN — CEFEPIME 1 G: 1 INJECTION, POWDER, FOR SOLUTION INTRAMUSCULAR; INTRAVENOUS at 04:12

## 2023-12-05 RX ADMIN — GABAPENTIN 300 MG: 300 CAPSULE ORAL at 10:12

## 2023-12-05 RX ADMIN — INSULIN HUMAN 1.2 UNITS/HR: 1 INJECTION, SOLUTION INTRAVENOUS at 11:12

## 2023-12-05 RX ADMIN — HEPARIN SODIUM AND DEXTROSE 200 UNITS/HR: 10000; 5 INJECTION INTRAVENOUS at 05:12

## 2023-12-05 RX ADMIN — HYDROMORPHONE HYDROCHLORIDE 0.5 MG: 0.5 INJECTION, SOLUTION INTRAMUSCULAR; INTRAVENOUS; SUBCUTANEOUS at 02:12

## 2023-12-05 RX ADMIN — Medication 10 ML: at 12:12

## 2023-12-05 RX ADMIN — INDOMETHACIN 50 MEQ: 25 CAPSULE ORAL at 12:12

## 2023-12-05 RX ADMIN — EPINEPHRINE 0.06 MCG/KG/MIN: 1 INJECTION INTRAMUSCULAR; INTRAVENOUS; SUBCUTANEOUS at 04:12

## 2023-12-05 RX ADMIN — INSULIN ASPART 1 UNITS: 100 INJECTION, SOLUTION INTRAVENOUS; SUBCUTANEOUS at 04:12

## 2023-12-05 RX ADMIN — ALTEPLASE 4 MG: 2.2 INJECTION, POWDER, LYOPHILIZED, FOR SOLUTION INTRAVENOUS at 05:12

## 2023-12-05 RX ADMIN — SODIUM BICARBONATE 50 MEQ: 84 INJECTION, SOLUTION INTRAVENOUS at 12:12

## 2023-12-05 RX ADMIN — INSULIN ASPART 3 UNITS: 100 INJECTION, SOLUTION INTRAVENOUS; SUBCUTANEOUS at 12:12

## 2023-12-05 RX ADMIN — ACETAMINOPHEN 650 MG: 325 TABLET ORAL at 01:12

## 2023-12-05 RX ADMIN — SODIUM CHLORIDE: 9 INJECTION, SOLUTION INTRAVENOUS at 08:12

## 2023-12-05 RX ADMIN — ALBUMIN (HUMAN) 25 G: 12.5 SOLUTION INTRAVENOUS at 12:12

## 2023-12-05 RX ADMIN — ATORVASTATIN CALCIUM 40 MG: 10 TABLET, FILM COATED ORAL at 08:12

## 2023-12-05 NOTE — ASSESSMENT & PLAN NOTE
- suspect acute tubular injury secondary to hypotension/cardiogenic shock likely compounded by intra-arterial contrast and anemia with urinary sediment with granular casts  - will proceed with RRT for next 24 hours with 12 hours of SLED followed by 12 hours of SCUFF for metabolic clearance and volume management (goal to keep net even to assess tolerance), consent obtained via wife at bedside  - Q8H RFPs and magnesium levels per SLED protocol   - renal diet/tube feeds when not NPO, volume restriction per primary team  - strict I/O's and daily weights  - keep MAP > 65 mmHg  - renally all dose medications to eGFR  - avoid nephrotoxic agents wean feasible (i.e. NSAIDs, intra-arterial contrast, supra-therapeutic vancomycin levels, etc.)

## 2023-12-05 NOTE — RESPIRATORY THERAPY
Patient extubated per MD order.  Placed patient on 4L NC with Nitric Oxide in line.  Will continue to monitor.

## 2023-12-05 NOTE — ASSESSMENT & PLAN NOTE
-A1c 7.6, not insulin dependent  - MDSSI  -Currently on Insulin gtt   - Endocrine following, appreciate assistance with blood glucose management

## 2023-12-05 NOTE — PROCEDURES
Radha Abbott is a 63 y.o. male patient.    Temp: 99.2 °F (37.3 °C) (12/05/23 0800)  Pulse: 100 (12/05/23 1145)  Resp: (!) 38 (12/05/23 1051)  BP: (!) 78/0 (12/05/23 0800)  SpO2: 96 % (12/05/23 1145)  Weight: 91.6 kg (202 lb) (12/05/23 0500)  Height: 6' (182.9 cm) (12/05/23 0723)    PICC  Date/Time: 12/5/2023 12:08 PM  Performed by: Maxine Paige RN  Assisting provider: Victoriano Thornton RN  Time out: Immediately prior to procedure a time out was called to verify the correct patient, procedure, equipment, support staff and site/side marked as required  Indications: med administration  Anesthesia: local infiltration  Local anesthetic: lidocaine 1% without epinephrine  Anesthetic Total (mL): 3  Preparation: skin prepped with ChloraPrep  Skin prep agent dried: skin prep agent completely dried prior to procedure  Sterile barriers: all five maximum sterile barriers used - cap, mask, sterile gown, sterile gloves, and large sterile sheet  Hand hygiene: hand hygiene performed prior to central venous catheter insertion  Location details: right basilic  Catheter type: triple lumen  Catheter size: 5 Fr  Catheter Length: 36cm    Ultrasound guidance: yes  Vessel Caliber: medium and patent, compressibility normal  Vascular Doppler: not done  Needle advanced into vessel with real time Ultrasound guidance.  Guidewire confirmed in vessel.  Image recorded and saved.  Sterile sheath used.  Number of attempts: 1  Post-procedure: blood return through all ports, chlorhexidine patch and sterile dressing applied  Technical procedures used: 3CG  Specimens: No  Implants: No  Assessment: placement verified by x-ray  Complications: none          Name Victoriano Thornton RN  12/5/2023

## 2023-12-05 NOTE — HPI
Radha Abbott is a 63 year old male with hx of CAD s/p 3v CABG (unclear anatomy, 2009), ICM with a recent EF of 15-20% s/p ICD (elsa 2009), DM2 (a1c 7.7), HTN, HLD, Vfib on amio who presents to the ED with CC of SOB.     Pt was recently admitted to [facility] as a transfer.  He was started on a Lasix gtt and did well.  Started on gDMT and discharged home on  5 with plans to follow up in HTS clinic for ongoing transplant evaluation at another facility.  He says that about a few days ago he started to notice LE swelling.  This turned into Nelson and orthopnea.  He says he can't walk to the bathroom without getting SOB.  He also complains of weight gain.  He takes torsemide 20mg BID at home and was told to trial additional Lasix which he did without any improvement.  He was rx metolazone but has not been filled.  He came to the ED     In the ED he was AF with stable VS on RA.  CBC showing chronic anemia.  CMP notable for acute on chronic CKD with baseline around 2.1 and Cr now 2.6.  BNP elevated.  Lactate neg.  CXR showing pulmonary edema.  I evaluated the pt at the bedside.  Bedside TTE showing CVP >15.  He was subsequently admitted to the CCU for diuresis. Skin integrity ENRRIQUE consulted for evaluation of skin injury.

## 2023-12-05 NOTE — PROGRESS NOTES
[facility]  Adult Nutrition  Progress Note    SUMMARY       Recommendations:    1.) Recommend is and when medically feasible, recommend to ADAT with goal of Low Na diet, texture per SLP, fluid per MD    - add renal modifiers if labs do not improve.     2.) If alternative nutrition is medically warranted, recommend   Novasource renal 40 ml/hr to provide 1920 kcal, 87 g pro, 688 mL water.     - Bolus TF rec: Novasource renal 4 cans/d to provide 1900 kcal, 86 g pro, 680 mL water.        Goals: Meet % of EEN/EPN by RD f/u date  Nutrition Goal Status: goal not met  Communication of RD Recs:  (POC)    Assessment and Plan    Nutrition Problem  Increased protein needs      Related to (etiology):   Physiological needs      Signs and Symptoms (as evidenced by):   HF     Interventions (treatment strategy):  Collaboration of nutrition care w/ other providers   LVAD placed 12/1     Nutrition Diagnosis Status:   Continues       Reason for Assessment    Reason For Assessment: RD follow-up  Diagnosis: cardiac disease (CHF/ s/p LVAD on 12/1)  Relevant Medical History: CAD, DMT2, CHF, PAF  Interdisciplinary Rounds: did not attend  General Information Comments: RD f/u: Pt has been NPO X 5 days- pt is at risk for malnutrition. Pt was seen briefly by RD. Pt is non-verbal at the moment, extubated this AM. Pt had chest closure yesterday. Caregiver was present in the room. Caregiver had no further questions for RD. Noted that gluc is still trending high. Renal labs have been worsening. Trace edema present. RD to continue to monitor.  Nutrition Discharge Planning: Cardiac diet education provided 11/15- no additional questions for me at this time.    Nutrition Risk Screen    Nutrition Risk Screen: no indicators present    Nutrition/Diet History    Spiritual, Cultural Beliefs, Restorationism Practices, Values that Affect Care: no  Food Allergies: NKFA    Anthropometrics    Temp: 98.2 °F (36.8 °C)  Height Method:  Stated  Height: 6' (182.9 cm)  Height (inches): 72 in  Weight Method: Bed Scale  Weight: 91.6 kg (202 lb)  Weight (lb): 202 lb  Ideal Body Weight (IBW), Male: 178 lb  % Ideal Body Weight, Male (lb): 97.19 %  BMI (Calculated): 27.4  BMI Grade: 25 - 29.9 - overweight    Lab/Procedures/Meds    Pertinent Labs Reviewed: reviewed  Pertinent Labs Comments: BUN: 72, cr: 3.5, GFR: 19, gluc: 205, Ca: 8.5, phos: 7.3, alb: 2.5, CRP: 400.8 (12/4)  Pertinent Medications Reviewed: reviewed  Pertinent Medications Comments: Amiodarone, statin, Fe, pantoprazole, senna-docusate, NaCl, polyethylene glycol, dobutamine, epi, lasix, heparin, insulin, nicardipine, nitric    Estimated/Assessed Needs    Weight Used For Calorie Calculations: 91.6 kg (201 lb 15.1 oz)  Energy Calorie Requirements (kcal): 1934 kcal  Energy Need Method: [place] (MSJ x 1.1 (d/t edema present))  Protein Requirements: 101g (1.2g/kg)- d/t IVÁN  Weight Used For Protein Calculations: 91.6 kg (201 lb 15.1 oz)  Fluid Requirements (mL): 1 ml/kcal or per MD     RDA Method (mL): 1934     Nutrition Prescription Ordered    Current Diet Order: NPO    Evaluation of Received Nutrient/Fluid Intake    I/O: +410ml since admit  Energy Calories Required: not meeting needs  Protein Required: not meeting needs  Fluid Required:  (Per MD)  Total Fluid Intake (mL/kg): 1 ml/kcal or per MD  Comments: LBM 11/30  Tolerance:  (NPO)  % Intake of Estimated Energy Needs: 0 - 25 %  % Meal Intake: NPO    Nutrition Risk    Level of Risk/Frequency of Follow-up:  (1x/wk)     Monitor and Evaluation    Food and Nutrient Intake: energy intake, food and beverage intake, enteral nutrition intake, parenteral nutrition intake  Food and Nutrient Adminstration: diet order, enteral and parenteral nutrition administration  Knowledge/Beliefs/Attitudes: food and nutrition knowledge/skill, beliefs and attitudes  Physical Activity and Function: nutrition-related ADLs and IADLs, factors affecting access to  physical activity  Anthropometric Measurements: height/length, weight, weight change, body mass index, growth pattern indices/percentile ranks  Biochemical Data, Medical Tests and Procedures: electrolyte and renal panel, gastrointestinal profile, glucose/endocrine profile, inflammatory profile, lipid profile  Nutrition-Focused Physical Findings: overall appearance, extremities, muscles and bones, head and eyes, skin     Nutrition Follow-Up    RD Follow-up?: Yes

## 2023-12-05 NOTE — ASSESSMENT & PLAN NOTE
Patient is identified as having Combined Systolic and Diastolic heart failure that is Acute on chronic. CHF is currently uncontrolled due to JVD, Rales/crackles on pulmonary exam, and Pulmonary edema/pleural effusion on CXR. Latest ECHO performed and demonstrates- Results for orders placed during the hospital encounter of 11/09/23    Echo    Interpretation Summary    Left Ventricle: The left ventricle is moderately dilated. Mildly increased ventricular mass. Normal wall thickness. There is eccentric hypertrophy. Severe global hyperkinesis present. There is severely reduced systolic function with a visually estimated ejection fraction of 10 -15%. Grade III diastolic dysfunction.    Right Ventricle: Normal right ventricular cavity size. Systolic function is normal. Pacemaker lead present in the ventricle.    Left Atrium: Left atrium is severely dilated.    Mitral Valve: There is annular and bileaflet tethering. There is moderate regurgitation with a centrally directed jet.    Tricuspid Valve: There is mild regurgitation with a centrally directed jet.    Pulmonary Artery: The estimated pulmonary artery systolic pressure is 41 mmHg.    IVC/SVC: Normal venous pressure at 3 mmHg.    last BNP reviewed- and noted below   Recent Labs   Lab 12/04/23  0412   *     - management per primary team  - LVAD for mechanical support in addition to dobutamine and epinephrine for ionotropic support  - UF with SLED

## 2023-12-05 NOTE — SUBJECTIVE & OBJECTIVE
Interval History: Patient POD #4 s/p HM3 LVAD implant and chest closure yesterday.Supported on  5, epi 0.06, Ketty 5 ppm, and Cardene. LVAD RPMs at 4800. Extubated today and confused, not following commands. Plans to start CRRT today.       Continuous Infusions:   sodium chloride 0.9% 10 mL/hr at 23 1300    DOBUTamine 5 mcg/kg/min (23)    EPINEPHrine 0.06 mcg/kg/min (23)    furosemide (LASIX) 2 mg/mL continuous infusion (non-titrating) 7.5 mg/hr (23)    heparin (porcine) in 5 % dex 200 Units/hr (23)    insulin regular 1 units/mL infusion orderable (TRANSFER) 1.2 Units/hr (23)    nicardipine 2.5 mg/hr (23)    nitric oxide gas       Scheduled Meds:   amiodarone  400 mg Per NG tube Daily    atorvastatin  40 mg Per NG tube Daily    ferrous gluconate  324 mg Per NG tube Daily with breakfast    mupirocin   Nasal BID    pantoprazole  40 mg Intravenous BID    polyethylene glycol  17 g Per NG tube Daily    QUEtiapine  50 mg Oral Daily    senna-docusate 8.6-50 mg  1 tablet Per NG tube BID    sodium chloride 0.9%  10 mL Intravenous Q6H     PRN Meds:acetaminophen, albumin human 5%, albuterol sulfate, bisacodyL, dextrose 10%, dextrose 10%, [] fentaNYL **FOLLOWED BY** fentaNYL, gabapentin, glucagon (human recombinant), glucose, glucose, HYDROmorphone, insulin aspart U-100, magnesium hydroxide 400 mg/5 ml, magnesium sulfate IVPB, mupirocin, ondansetron, oxyCODONE, oxyCODONE, potassium chloride, potassium chloride, senna-docusate 8.6-50 mg, sodium chloride 0.9%, Flushing PICC/Midline Protocol **AND** sodium chloride 0.9% **AND** sodium chloride 0.9%    Review of patient's allergies indicates:  No Known Allergies  Objective:     Vital Signs (Most Recent):  Temp: 98.2 °F (36.8 °C) (23 1200)  Pulse: 97 (23 1300)  Resp: (!) 32 (23 1300)  BP: (!) 80/0 (23 1200)  SpO2: 98 % (23) Vital Signs (24h Range):  Temp:  [98.2 °F  (36.8 °C)-99.9 °F (37.7 °C)] 98.2 °F (36.8 °C)  Pulse:  [] 97  Resp:  [16-38] 32  SpO2:  [90 %-100 %] 98 %  BP: (78-90)/(0) 80/0  Arterial Line BP: ()/(6-80) 89/64     Patient Vitals for the past 72 hrs (Last 3 readings):   Weight   12/05/23 0500 91.6 kg (202 lb)       Body mass index is 27.4 kg/m².      Intake/Output Summary (Last 24 hours) at 12/5/2023 1308  Last data filed at 12/5/2023 1300  Gross per 24 hour   Intake 2740.88 ml   Output 1065 ml   Net 1675.88 ml         Hemodynamic Parameters:  PAP: (39-51)/(18-37) 41/24  PAP (Mean):  [26 mmHg-40 mmHg] 31 mmHg    Telemetry: NSR        Physical Exam  Constitutional:       Appearance: He is ill-appearing.      Comments: Confused. Not following commands    HENT:      Head: Normocephalic and atraumatic.   Cardiovascular:      Rate and Rhythm: Normal rate and regular rhythm.      Comments: LVAD hum  Pulmonary:      Effort: Pulmonary effort is normal.      Breath sounds: Rales present.   Chest:      Comments: CT x2  Abdominal:      General: Abdomen is flat. Bowel sounds are normal. There is no distension.      Palpations: Abdomen is soft.   Musculoskeletal:         General: Normal range of motion.      Cervical back: Neck supple.   Skin:     General: Skin is warm and dry.   Neurological:      Mental Status: He is disoriented.      Comments: Not following commands. Jessica            Significant Labs:  CBC:  Recent Labs   Lab 12/05/23  0008 12/05/23  0143 12/05/23  0406 12/05/23  0450 12/05/23  0745 12/05/23  0914 12/05/23  1041 12/05/23  1232   WBC 14.73*  --  13.99*  --  13.35*  --   --   --    RBC 2.76*  --  2.50*  --  2.47*  --   --   --    HGB 8.1*  --  7.4*  --  7.3*  --   --   --    HCT 23.4*   < > 21.5*   < > 21.2* 22* 23* 30*     --  181  --  199  --   --   --    MCV 85  --  86  --  86  --   --   --    MCH 29.3  --  29.6  --  29.6  --   --   --    MCHC 34.6  --  34.4  --  34.4  --   --   --     < > = values in this interval not displayed.        BNP:  Recent Labs   Lab 11/29/23  1834 12/04/23  0412   * 285*       CMP:  Recent Labs   Lab 12/05/23  0008 12/05/23  0406 12/05/23  0745   * 177* 205*   CALCIUM 8.6* 8.4* 8.5*   ALBUMIN 2.1* 2.5* 2.5*   PROT 5.9* 6.0 6.0   * 137 136   K 3.8 3.9 4.0   CO2 16* 18* 17*    103 102   BUN 69* 69* 72*   CREATININE 3.1* 3.3* 3.5*   ALKPHOS 116 87 90   ALT 30 24 24   AST 39 37 38   BILITOT 1.0 1.1* 0.9        Coagulation:   Recent Labs   Lab 12/04/23  1801 12/05/23  0008 12/05/23  0406   INR 1.0 1.0 1.1   APTT 25.5 26.7 26.4       LDH:  Recent Labs   Lab 12/04/23  0412 12/05/23  0406   * 511*       Microbiology:  Microbiology Results (last 7 days)       Procedure Component Value Units Date/Time    Blood culture [[id number]] Collected: 11/28/23 1452    Order Status: Completed Specimen: Blood from Peripheral, Upper Arm, Right Updated: 12/03/23 1612     Blood Culture, Routine No growth after 5 days.    Blood culture [[id number]] Collected: 11/28/23 1450    Order Status: Completed Specimen: Blood from Peripheral, Forearm, Right Updated: 12/03/23 1612     Blood Culture, Routine No growth after 5 days.            BMP:   Recent Labs   Lab 12/05/23  0745   *      K 4.0      CO2 17*   BUN 72*   CREATININE 3.5*   CALCIUM 8.5*   MG 2.4       I have reviewed all pertinent labs within the past 24 hours.    Estimated Creatinine Clearance: 24.3 mL/min (A) (based on SCr of 3.5 mg/dL (H)).    Diagnostic Results:  I have reviewed all pertinent imaging results/findings within the past 24 hours.

## 2023-12-05 NOTE — HPI
Mr. Abbott is a 63-year-old man with ischemic cardiomyopathy with most recent TTE showing EF 10 -15% and G3DD s/p [employer] ICM placement on chronic dobutamine infusion, CAD s/p x3 vessel CABG back in 2009, type 2 diabetes mellitus (most recent hemoglobin A1c 7.6%), hypertension, HLD, history of ventricular fibrillation for which he is on amiodarone and CKD IIIb (baseline creatinine ~2-2.2) who was initially admitted on 11/9 with heart failure exacerbation and hypervolemia for which he was managed with inotrope with dobutamine in addition to IV diuresis. He ultimately underwent IABP placed on 11/21 for mechanical hemodynamic support and subsequently underwent LVAD placement on 12/1. His renal function appears to be mostly stable with IVÁN and creatinine in mid 2s to 3s in addition he is still making urine with Lasix infusion however on 12/5 Nephrology consulted given concern for uremia with BUN 72.

## 2023-12-05 NOTE — NURSING
Patient admited back to SICU room. Vital signs stable. Dr. Washington discussed plan of care with wife. Refer to flowsheet for vs and assessment.

## 2023-12-05 NOTE — PROGRESS NOTES
[facility]  Critical Care - Surgery  Progress Note    Patient Name: Radha Abbott  MRN: [id number]  Admission Date: 11/9/2023  Hospital Length of Stay: 26 days  Code Status: Full Code  Attending Provider: Yuri Washington MD  Primary Care Provider: Vasu Kong MD   Principal Problem: Acute on chronic combined systolic and diastolic CHF, NYHA class 4    Subjective:     Hospital/ICU Course:  No notes on file    Interval History/Significant Events: Chest closure yesterday. Lasix gtt remains at 7.5,  5, epinephrine 0.06, cardene 5. Given 500cc albumin for base excess -6. UOP dropped after 9 PM to 20/hr. Lasix 40 given at 0430. LVAD at 4800 rpm, 3.8 L/min.    Follow-up For: Procedure(s) (LRB):  CLOSURE, WOUND, STERNUM (N/A)    Post-Operative Day: 1 Day Post-Op    Objective:     Vital Signs (Most Recent):  Temp: 99 °F (37.2 °C) (12/05/23 0300)  Pulse: 93 (12/05/23 0500)  Resp: (!) 21 (12/05/23 0234)  BP: (!) 84/0 (12/05/23 0300)  SpO2: 100 % (12/05/23 0500) Vital Signs (24h Range):  Temp:  [98.8 °F (37.1 °C)-99.9 °F (37.7 °C)] 99 °F (37.2 °C)  Pulse:  [] 93  Resp:  [16-21] 21  SpO2:  [97 %-100 %] 100 %  BP: (78-90)/(0) 84/0  Arterial Line BP: (79-99)/(58-80) 93/72     Weight: 91.6 kg (202 lb)  Body mass index is 27.4 kg/m².      Intake/Output Summary (Last 24 hours) at 12/5/2023 0527  Last data filed at 12/5/2023 0500  Gross per 24 hour   Intake 2776.87 ml   Output 1580 ml   Net 1196.87 ml          Physical Exam  Vitals and nursing note reviewed.   Constitutional:       General: He is not in acute distress.     Appearance: Normal appearance.   HENT:      Head: Normocephalic and atraumatic.      Mouth/Throat:      Mouth: Mucous membranes are moist.      Pharynx: Oropharynx is clear.   Cardiovascular:      Rate and Rhythm: Normal rate and regular rhythm.   Pulmonary:      Effort: Pulmonary effort is normal. No respiratory distress.      Breath sounds: Normal breath sounds.       Comments: intubated  Abdominal:      General: Abdomen is flat. There is no distension.      Palpations: Abdomen is soft.      Tenderness: There is no abdominal tenderness.   Genitourinary:     Comments: goodman  Musculoskeletal:         General: Normal range of motion.      Right lower leg: No edema.      Left lower leg: No edema.   Skin:     General: Skin is warm and dry.   Neurological:      Comments: sedated          Vents:  Vent Mode: A/C (12/05/23 0450)  Ventilator Initiated: Yes (12/01/23 1529)  Set Rate: 16 BPM (12/05/23 0450)  Vt Set: 490 mL (12/05/23 0450)  PEEP/CPAP: 5 cmH20 (12/05/23 0450)  Oxygen Concentration (%): 40 (12/05/23 0500)  Peak Airway Pressure: 21 cmH20 (12/05/23 0450)  Plateau Pressure: 21 cmH20 (12/05/23 0450)  Total Ve: 8.49 L/m (12/05/23 0450)  Negative Inspiratory Force (cm H2O): 0 (12/05/23 0450)  F/VT Ratio<105 (RSBI): (!) 39.01 (12/04/23 1803)    Lines/Drains/Airways       Central Venous Catheter Line  Duration              Introducer with Double Lumen 12/01/23 1554 Internal Jugular Right 3 days    Pulmonary Artery Catheter Assessment  12/01/23 1556 Internal Jugular Right 3 days    Trialysis (Dialysis) Catheter 12/01/23 1530 left internal jugular 3 days              Drain  Duration                  Chest Tube Tube - 1 Right Mediastinal -- days         Chest Tube 12/01/23 Tube - 2 Left Mediastinal 4 days         Urethral Catheter 12/01/23 0754 Temperature probe;Silicone;Non-latex 14 Fr. 3 days         NG/OG Tube 12/04/23 1730 Left nostril <1 day              Airway  Duration                  Airway - Non-Surgical 12/01/23 1534 Endotracheal Tube 3 days              Arterial Line  Duration             Arterial Line 12/01/23 1500 Left Radial 3 days              Line  Duration                  VAD 12/01/23 1015 Left ventricular assist device HeartMate 3 3 days              Peripheral Intravenous Line  Duration                  Peripheral IV - Single Lumen 11/29/23 1100 20 G Left Forearm 5  days         Peripheral IV - Single Lumen 12/04/23 20 G Anterior;Right Forearm 1 day                    Significant Labs:    CBC/Anemia Profile:  Recent Labs   Lab 12/04/23  1801 12/04/23  1803 12/05/23  0008 12/05/23  0143 12/05/23  0339 12/05/23  0406 12/05/23  0450   WBC 13.87*  --  14.73*  --   --  13.99*  --    HGB 8.5*  --  8.1*  --   --  7.4*  --    HCT 24.4*   < > 23.4*   < > 25* 21.5* 20*     --  188  --   --  181  --    MCV 86  --  85  --   --  86  --    RDW 15.4*  --  15.6*  --   --  15.4*  --     < > = values in this interval not displayed.        Chemistries:  Recent Labs   Lab 12/04/23  1801 12/05/23  0008 12/05/23  0406   * 135* 137   K 3.9 3.8 3.9    103 103   CO2 18* 16* 18*   BUN 61* 69* 69*   CREATININE 2.9* 3.1* 3.3*   CALCIUM 8.3* 8.6* 8.4*   ALBUMIN 2.1* 2.1* 2.5*   PROT 6.0 5.9* 6.0   BILITOT 0.7 1.0 1.1*   ALKPHOS 91 116 87   ALT 30 30 24   AST 36 39 37   MG 2.4 2.3 2.4   PHOS 7.3* 7.5* 6.9*       ABGs:   Recent Labs   Lab 12/05/23  0450   PH 7.421   PCO2 28.8*   HCO3 18.7*   POCSATURATED 99   BE -6*       Significant Imaging:  I have reviewed all pertinent imaging results/findings within the past 24 hours.   Assessment/Plan:     Cardiac/Vascular  * Acute on chronic combined systolic and diastolic CHF, NYHA class 4    Neuro/Psych:   -- Sedation: propofol 50mcg/kg/min  -- Pain:    - fentanyl PRN, dilaudid 0.5 q2 PRN while intubated, Oxy PRN  -- SAT daily while intubated             Cardiac:   -- S/P redo sternotomy LVAD with Dr. Padmini on 12/01, chest closed 12/4  -- MAP Goal: 70-80  -- Pressors: epi 0.06,  5   -- levo, vaso, shaji off  -- Anti-HTNs: Cardene   -- Rhythm: NSR  -- amiodarone 400mg daily  -- LVAD heartmate III: RPM 4800, flow 3.68  -- Beta blocker: Will start when appropriate  -- PICC line to be placed today  -- Statin: Atorvastatin 40 mg QD  -- No ASA at this time      Pulmonary:   -- Goal SpO2 >92%  -- Intubated, minimal vent settings, reassuring SAT  -- Ketty  weaned to 5.    -- extubate today to 5L NC  -- Chest Tubes x 2 (2 Meds):   - output 255cc/24hr; 10-20cc/hr since 9 PM  -- Q1 ABGs and lactates      Renal:  -- Trend BUN/Cr   -- Maintain [place], record strict Is/Os  -- lasix gtt 7.5  -- UOP 20-30/hr, net -+1.34L/24 hrs  -- goal to be net neg 1.5-2L/24hr      Recent Labs   Lab 12/04/23  1801 12/05/23  0008 12/05/23  0406   BUN 61* 69* 69*   CREATININE 2.9* 3.1* 3.3*           FEN / GI:   -- Daily CMP, PRN K/Mag/Phos per protocol   -- Replace electrolytes as needed  -- Nutrition: NPO  -- NGT in place  -- Bowel Regimen: Miralax, docusate      ID:   -- Afebrile  -- WBC stable  -- Abx: none, completed Ancef dose in OR yesterday  -- vanc level 7.8    Recent Labs   Lab 12/04/23  1801 12/05/23  0008 12/05/23  0406   WBC 13.87* 14.73* 13.99*           Heme/Onc:   -- Hgb 8.7 pre-operatively  -- received 7 RBC, 2 FFP, 1 platelet, 10 cry intra-op  -- 12/1: 1 RBC, 4 FFP, 2 plts, 1 cryo  -- BID CBC and coags  -- q4 fibrinogen - keep fibrinogen >300  -- holding ASA    Recent Labs   Lab 12/04/23  1801 12/05/23  0008 12/05/23  0406   HGB 8.5* 8.1* 7.4*    188 181   APTT 25.5 26.7 26.4   INR 1.0 1.0 1.1           Endocrine:   -- CTS Goal -140  -- HgbA1c: 7.6  -- Endocrinology consulted for insulin management      PPx:   Feeding: NPO  Analgesia/Sedation: fent, dilaudid / Prop  Thromboembolic Prevention: SCDs  HOB >30: Yes  Stress Ulcer: pantoprazole   Glucose Control: Yes, insulin management per Endocrinology  Bowel reg:   Invasive Lines/Drains/Airway:   ETT  NGT  Left radial arterial line  RIJ mac w/ deanne + jeanna ELLIOTT quad CVC  [place]  Chest Tubes: 2 Mediastinal  LVAD     Dispo/Code Status/Palliative:     - Continue SICU Care    - Full Code      Critical care was time spent personally by me on the following activities: development of treatment plan with patient or surrogate and bedside caregivers, discussions with consultants, evaluation of patient's response to treatment,  examination of patient, ordering and performing treatments and interventions, ordering and review of laboratory studies, ordering and review of radiographic studies, pulse oximetry, re-evaluation of patient's condition.  This critical care time did not overlap with that of any other provider or involve time for any procedures.     Melanie Padilla MD  Critical Care - Surgery  [facility]

## 2023-12-05 NOTE — CONSULTS
[facility]  Skin Integrity ENRRIQUE  Consult Note    Patient Name: Radha Abbott  MRN: [id number]  Admission Date: 11/9/2023  Hospital Length of Stay: 26 days  Attending Physician: Yuri Washington MD  Primary Care Provider: Vasu Kong MD     Consults  Subjective:     History of Present Illness:  Radha Abbott is a 63 year old male with hx of CAD s/p 3v CABG (unclear anatomy, 2009), ICM with a recent EF of 15-20% s/p ICD (medtronik 2009), DM2 (a1c 7.7), HTN, HLD, Vfib on amio who presents to the ED with CC of SOB.     Pt was recently admitted to [facility] as a transfer.  He was started on a Lasix gtt and did well.  Started on gDMT and discharged home on  5 with plans to follow up in HTS clinic for ongoing transplant evaluation at another facility.  He says that about a few days ago he started to notice LE swelling.  This turned into Nelson and orthopnea.  He says he can't walk to the bathroom without getting SOB.  He also complains of weight gain.  He takes torsemide 20mg BID at home and was told to trial additional Lasix which he did without any improvement.  He was rx metolazone but has not been filled.  He came to the ED     In the ED he was AF with stable VS on RA.  CBC showing chronic anemia.  CMP notable for acute on chronic CKD with baseline around 2.1 and Cr now 2.6.  BNP elevated.  Lactate neg.  CXR showing pulmonary edema.  I evaluated the pt at the bedside.  Bedside TTE showing CVP >15.  He was subsequently admitted to the CCU for diuresis. Skin integrity ENRRIQUE consulted for evaluation of skin injury.    Scheduled Meds:   amiodarone  400 mg Per NG tube Daily    atorvastatin  40 mg Per NG tube Daily    ferrous gluconate  324 mg Per NG tube Daily with breakfast    mupirocin   Nasal BID    pantoprazole  40 mg Intravenous BID    polyethylene glycol  17 g Per NG tube Daily    QUEtiapine  50 mg Oral Daily    senna-docusate 8.6-50 mg  1 tablet Per NG tube BID    sodium chloride 0.9%  10 mL  Intravenous Q6H     Continuous Infusions:   sodium chloride 0.9% 10 mL/hr at 23 1300    DOBUTamine 5 mcg/kg/min (23 1300)    EPINEPHrine 0.06 mcg/kg/min (23 1300)    furosemide (LASIX) 2 mg/mL continuous infusion (non-titrating) 7.5 mg/hr (23 1339)    heparin (porcine) in 5 % dex 200 Units/hr (23 1300)    insulin regular 1 units/mL infusion orderable (TRANSFER) 1.2 Units/hr (23 1300)    nicardipine 2.5 mg/hr (23 1300)    nitric oxide gas       PRN Meds:acetaminophen, albumin human 5%, albuterol sulfate, bisacodyL, dextrose 10%, dextrose 10%, [] fentaNYL **FOLLOWED BY** fentaNYL, gabapentin, glucagon (human recombinant), glucose, glucose, HYDROmorphone, insulin aspart U-100, magnesium hydroxide 400 mg/5 ml, magnesium sulfate IVPB, mupirocin, ondansetron, oxyCODONE, oxyCODONE, potassium chloride, potassium chloride, senna-docusate 8.6-50 mg, sodium chloride 0.9%, Flushing PICC/Midline Protocol **AND** sodium chloride 0.9% **AND** sodium chloride 0.9%    Review of patient's allergies indicates:  No Known Allergies     Past Medical History:   Diagnosis Date    CAD (coronary artery disease)     Diabetes mellitus     HFrEF (heart failure with reduced ejection fraction)     ICD (implantable cardioverter-defibrillator) in place     MI, old      Past Surgical History:   Procedure Laterality Date    ANGIOPLASTY-VENOUS ARTERY Right 2023    Procedure: ANGIOPLASTY-VENOUS ARTERY, RIGHT FEMORAL;  Surgeon: Yuri Washington MD;  Location: [facility] OR [address] [place] [address];  Service: Cardiovascular;  Laterality: Right;    AORTIC VALVULOPLASTY N/A 2023    Procedure: REPAIR, AORTIC VALVE;  Surgeon: Yuri Washington MD;  Location: [facility] OR [address] [place] [address];  Service: Cardiovascular;  Laterality: N/A;    CARDIAC SURGERY      CLOSURE N/A 2023    Procedure: CLOSURE, TEMPORARY;  Surgeon: Yuri Washington MD;  Location: [facility] OR 2ND FLR;  Service: Cardiovascular;  Laterality: N/A;    DRAINAGE OF PLEURAL  EFFUSION  12/4/2023    Procedure: DRAINAGE, PLEURAL EFFUSION;  Surgeon: Yuri Washington MD;  Location: [facility] OR [facility] FLR;  Service: Cardiovascular;;    INSERTION OF GRAFT TO PERICARDIUM  12/4/2023    Procedure: INSERTION, GRAFT, PERICARDIUM;  Surgeon: Yuri Washington MD;  Location: [facility] OR [facility] FLR;  Service: Cardiovascular;;    INSERTION OF INTRA-AORTIC BALLOON ASSIST DEVICE Right 11/21/2023    Procedure: INSERTION, INTRA-AORTIC BALLOON PUMP;  Surgeon: Finn Cohn MD;  Location: [facility] CATH LAB;  Service: Cardiology;  Laterality: Right;    LEFT VENTRICULAR ASSIST DEVICE Left 12/1/2023    Procedure: INSERTION-LEFT VENTRICULAR ASSIST DEVICE;  Surgeon: Yuri Washington MD;  [facility];  Service: Cardiovascular;  Laterality: Left;  REDO STERNOTOMY - REDO SAW NEEDED FOR CASE    LYSIS OF ADHESIONS  12/1/2023    Procedure: LYSIS, ADHESIONS;  Surgeon: Yuri Washington MD;  [facility];  Service: Cardiovascular;;    PLACEMENT OF SWAN ROLANDO CATHETER WITH IMAGING GUIDANCE  11/20/2023    Procedure: INSERTION, CATHETER, SWAN-ROLANDO, WITH IMAGING GUIDANCE;  Surgeon: Sajan Hurley MD;  Location: [facility] CATH LAB;  Service: Cardiology;;    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 12/1/2023    Procedure: REPAIR, ANEURYSM, ARTERY, FEMORAL;  Surgeon: Yuri Washington MD;  [facility];  Service: Cardiovascular;  Laterality: Right;  Right Femoral Artery Repair    RIGHT HEART CATHETERIZATION Right 10/10/2023    Procedure: INSERTION, CATHETER, RIGHT HEART;  Surgeon: Bin Gandhi MD;  Location: [facility] CATH LAB;  Service: Cardiology;  Laterality: Right;    RIGHT HEART CATHETERIZATION Right 10/13/2023    Procedure: INSERTION, CATHETER, RIGHT HEART;  Surgeon: Walter Mcintyre MD;  Location: [facility] CATH LAB;  Service: Cardiology;  Laterality: Right;    RIGHT HEART CATHETERIZATION  11/13/2023    RIGHT HEART CATHETERIZATION Right 11/13/2023    Procedure: INSERTION, CATHETER, RIGHT HEART;   Surgeon: Juventino Bermudez Jr., MD;  Location: [facility] CATH LAB;  Service: Cardiology;  Laterality: Right;    RIGHT HEART CATHETERIZATION Right 11/20/2023    Procedure: INSERTION, CATHETER, RIGHT HEART;  Surgeon: Sajan Hurley MD;  Location: [facility] CATH LAB;  Service: Cardiology;  Laterality: Right;    STERNAL WOUND CLOSURE N/A 12/4/2023    Procedure: CLOSURE, WOUND, STERNUM;  Surgeon: Yuri Washington MD;  [facility];  Service: Cardiovascular;  Laterality: N/A;    STERNOTOMY N/A 12/1/2023    Procedure: STERNOTOMY, REDO;  Surgeon: Yuri Washington MD;  [facility];  Service: Cardiovascular;  Laterality: N/A;    VALVULOPLASTY, MITRAL VALVE N/A 12/1/2023    Procedure: VALVULOPLASTY, MITRAL VALVE;  Surgeon: Yuri Washington MD;  [facility];  Service: Cardiovascular;  Laterality: N/A;       Family History    None       Tobacco Use    Smoking status: Every Day     Current packs/day: 0.50     Types: Cigarettes    Smokeless tobacco: Not on file   Substance and Sexual Activity    Alcohol use: Yes     Comment: rarely    Drug use: No    Sexual activity: Not on file     Review of Systems   Skin:  Positive for wound.       Objective:     Vital Signs (Most Recent):  Temp: 98.2 °F (36.8 °C) (12/05/23 1200)  Pulse: 97 (12/05/23 1300)  Resp: (!) 32 (12/05/23 1300)  BP: (!) 80/0 (12/05/23 1200)  SpO2: 98 % (12/05/23 1300) Vital Signs (24h Range):  Temp:  [98.2 °F (36.8 °C)-99.9 °F (37.7 °C)] 98.2 °F (36.8 °C)  Pulse:  [] 97  Resp:  [16-38] 32  SpO2:  [90 %-100 %] 98 %  BP: (78-90)/(0) 80/0  Arterial Line BP: ()/(6-80) 89/64     Weight: 91.6 kg (202 lb)  Body mass index is 27.4 kg/m².     Physical Exam  Constitutional:       Appearance: Normal appearance.   Skin:     General: Skin is warm and dry.      Findings: Lesion present.   Neurological:      Mental Status: He is alert.          Laboratory:  All pertinent labs reviewed within the last 24 hours.    Diagnostic  Results:  None      Assessment/Plan:         ENRRIQUE Skin Integrity Evaluation      Skin Integrity ENRRIQUE evaluation of patient as part of the comprehensive skin care team.     He has been admitted for 26 days.     Sacrum/buttocks        Orthopedic  At high risk for skin breakdown  - consult received for evaluation of skin injury.  - pt presented for sob. S/p LVAD placement 12/1.  - pt at high risk for skin breakdown due to immobility. Chest left open from 12/1-12/4 with pt flat on his back and unable to turn.  - skin assessment today with sacral and buttocks region intact. Sacral foam intact. Dark pigmentation noted to area.  - continue sacral foam. Change q3 days.  - Immerse surface.  - turn q2h.  - wedge for offloading.  - nursing to maintain pressure injury prevention measures and continue wound care per orders.        Thank you for your consult. I will follow-up with patient. Please contact us if you have any additional questions.      Dayanna rCabtree NP  Skin Integrity ENRRIQUE  [facility]

## 2023-12-05 NOTE — NURSING
Nurses Note -- 4 Eyes      12/4/2023   6:56 PM      Skin assessed during: Transfer       No Altered Skin Integrity Present    []Prevention Measures Documented      [] Yes- Altered Skin Integrity Present or Discovered   [] LDA Added if Not in Epic (Describe Wound)   [] New Altered Skin Integrity was Present on Admit and Documented in LDA   [] Wound Image Taken    Wound Care Consulted? No    Attending Nurse:  KYAW Mckeon    Second RN/Staff Member:  HARJINDER Wilkerson RN

## 2023-12-05 NOTE — PROGRESS NOTES
[facility] Int[facility]  Endocrinology  Progress Note    Admit Date: 11/9/2023     Reason for Consult: Management of T2DM, Hyperglycemia     Surgical Procedure and Date: n/a    Diabetes diagnosis year: 1998    Home Diabetes Medications:  Metformin (off since October)   Lab Results   Component Value Date    HGBA1C 7.6 (H) 10/12/2023       How often checking glucose at home?  Once daily in the AM    BG readings on regimen: 150-160s  Hypoglycemia on the regimen?  No  Missed doses on regimen?  n/a    Diabetes Complications include:     Hyperglycemia    Complicating diabetes co morbidities:   CAD s/p CABG, HTN, HLD      HPI:   Patient is a 63 y.o. male with a diagnosis of CAD s/p 3v CABG (unclear anatomy, 2009), ICM with a recent EF of 15-20% s/p ICD (medtronik 2009), DM2 (a1c 7.7), HTN, HLD, Vfib on amio who presents to the ED with CC of SOB. In the ED he was AF with stable VS on RA.  CBC showing chronic anemia.  CMP notable for acute on chronic CKD with baseline around 2.1 and Cr now 2.6.  BNP elevated.  Lactate neg.  CXR showing pulmonary edema. He was subsequently admitted to the CCU for diuresis.  Endocrinology consulted for management of T2DM.          Interval HPI:   No acute events overnight. Patient in room 91106/90980 A. Blood glucose stable. BG above goal on current insulin regimen (Transition Insulin Drip). Steroid use- None .   1 Day Post-Op  Renal function- Abnormal - 3.5   Vasopressors-  Epinephrine     Diet NPO Except for: Medication     Eating:   NPO  Nausea: No  Hypoglycemia and intervention: No  Fever: No  TPN and/or TF: No    BP (!) 78/0   Pulse 93   Temp 99.2 °F (37.3 °C)   Resp (!) 25   Ht 6' (1.829 m)   Wt 91.6 kg (202 lb)   SpO2 97%   BMI 27.40 kg/m²     Labs Reviewed and Include    Recent Labs   Lab 12/05/23  0745   *   CALCIUM 8.5*   ALBUMIN 2.5*   PROT 6.0      K 4.0   CO2 17*      BUN 72*   CREATININE 3.5*   ALKPHOS 90   ALT 24   AST 38   BILITOT 0.9  "    Lab Results   Component Value Date    WBC 13.35 (H) 12/05/2023    HGB 7.3 (L) 12/05/2023    HCT 22 (L) 12/05/2023    MCV 86 12/05/2023     12/05/2023     No results for input(s): "TSH", "FREET4" in the last 168 hours.  Lab Results   Component Value Date    HGBA1C 7.6 (H) 10/12/2023       Nutritional status:   Body mass index is 27.4 kg/m².  Lab Results   Component Value Date    ALBUMIN 2.5 (L) 12/05/2023    ALBUMIN 2.5 (L) 12/05/2023    ALBUMIN 2.1 (L) 12/05/2023     Lab Results   Component Value Date    PREALBUMIN 19 (L) 12/01/2023    PREALBUMIN 27 11/15/2023       Estimated Creatinine Clearance: 24.3 mL/min (A) (based on SCr of 3.5 mg/dL (H)).    Accu-Checks  Recent Labs     12/03/23  2052 12/04/23  0013 12/04/23  0410 12/04/23  0724 12/04/23  1204 12/04/23  1800 12/04/23  2021 12/05/23  0008 12/05/23  0405 12/05/23  0731   POCTGLUCOSE 148* 142* 129* 101 105 134* 172* 237* 174* 214*       Current Medications and/or Treatments Impacting Glycemic Control  Immunotherapy:    Immunosuppressants       None          Steroids:   Hormones (From admission, onward)      None          Pressors:    Autonomic Drugs (From admission, onward)      Start     Stop Route Frequency Ordered    12/01/23 1625  EPINEPHrine 5 mg in dextrose 5% 250 mL infusion (premix)        Question Answer Comment   Begin at (in mcg/kg/min): 0.1    Titrate by: (in mcg/kg/min) 0.01    Titrate interval: (in minutes) 5    Titrate to maintain: (SBP or MAP or Cardiac Index) MAP    Greater than: (in mmHg) 65    Maximum dose: (in mcg/kg/min) 2        -- IV Continuous 12/01/23 1625          Hyperglycemia/Diabetes Medications:   Antihyperglycemics (From admission, onward)      Start     Stop Route Frequency Ordered    12/04/23 1745  insulin regular in 0.9 % NaCl 100 unit/100 mL (1 unit/mL) infusion        Question:  Enter initial dose (Units/hr):  Answer:  1    -- IV Continuous 12/04/23 1742    12/03/23 0852  insulin aspart U-100 pen 0-5 Units         " -- SubQ As needed (PRN) 12/03/23 0752            ASSESSMENT and PLAN    Cardiac/Vascular  * Acute on chronic combined systolic and diastolic CHF, NYHA class 4  Managed per primary team  Optimize BG control    Renal/  IVÁN (acute kidney injury)  Lab Results   Component Value Date    CREATININE 3.5 (H) 12/05/2023     Avoid insulin stacking  Titrate insulin slowly       Endocrine  Type 2 diabetes mellitus without complication, without long-term current use of insulin  BG goal 140-180     - Increase transition drip to 1.2 units/hr with step-down parameters.   - Novolog (aspart) insulin prn for BG excursions LDC (150/50)   - BG checks q4hr while NPO   - Hypoglycemia protocol in place    ** Please notify Endocrine for any change and/or advance in diet**  ** Please call Endocrine for any BG related issues **    Discharge Planning:   TBD. Please notify endocrinology prior to discharge.                Ebony Carlson PA-C  Endocrinology  [facility]

## 2023-12-05 NOTE — PROGRESS NOTES
[facility] - Surgical Intensive Care  Heart Transplant  Progress Note    Patient Name: Radha Abbott  MRN: [id number]  Admission Date: 2023  Hospital Length of Stay: 26 days  Attending Physician: Yuri Washington MD  Primary Care Provider: Vasu Kong MD  Principal Problem:Acute on chronic combined systolic and diastolic CHF, NYHA class 4    Subjective:   Interval History: Patient POD #4 s/p HM3 LVAD implant and chest closure yesterday.Supported on  5, epi 0.06, Ketty 5 ppm, and Cardene. LVAD RPMs at 4800. Extubated today and confused, not following commands. Plans to start CRRT today.     Hemodynamics:   CVP 12  Sv02 53  CO 7.1  CI 3.3  PA 48/24         Continuous Infusions:   sodium chloride 0.9% 10 mL/hr at 23 1300    DOBUTamine 5 mcg/kg/min (23 1300)    EPINEPHrine 0.06 mcg/kg/min (23 1300)    furosemide (LASIX) 2 mg/mL continuous infusion (non-titrating) 7.5 mg/hr (23 1300)    heparin (porcine) in 5 % dex 200 Units/hr (23 1300)    insulin regular 1 units/mL infusion orderable (TRANSFER) 1.2 Units/hr (23 1300)    nicardipine 2.5 mg/hr (23 1300)    nitric oxide gas       Scheduled Meds:   amiodarone  400 mg Per NG tube Daily    atorvastatin  40 mg Per NG tube Daily    ferrous gluconate  324 mg Per NG tube Daily with breakfast    mupirocin   Nasal BID    pantoprazole  40 mg Intravenous BID    polyethylene glycol  17 g Per NG tube Daily    QUEtiapine  50 mg Oral Daily    senna-docusate 8.6-50 mg  1 tablet Per NG tube BID    sodium chloride 0.9%  10 mL Intravenous Q6H     PRN Meds:acetaminophen, albumin human 5%, albuterol sulfate, bisacodyL, dextrose 10%, dextrose 10%, [] fentaNYL **FOLLOWED BY** fentaNYL, gabapentin, glucagon (human recombinant), glucose, glucose, HYDROmorphone, insulin aspart U-100, magnesium hydroxide 400 mg/5 ml, magnesium sulfate IVPB, mupirocin, ondansetron, oxyCODONE, oxyCODONE, potassium chloride, potassium chloride,  senna-docusate 8.6-50 mg, sodium chloride 0.9%, Flushing PICC/Midline Protocol **AND** sodium chloride 0.9% **AND** sodium chloride 0.9%    Review of patient's allergies indicates:  No Known Allergies  Objective:     Vital Signs (Most Recent):  Temp: 98.2 °F (36.8 °C) (12/05/23 1200)  Pulse: 97 (12/05/23 1300)  Resp: (!) 32 (12/05/23 1300)  BP: (!) 80/0 (12/05/23 1200)  SpO2: 98 % (12/05/23 1300) Vital Signs (24h Range):  Temp:  [98.2 °F (36.8 °C)-99.9 °F (37.7 °C)] 98.2 °F (36.8 °C)  Pulse:  [] 97  Resp:  [16-38] 32  SpO2:  [90 %-100 %] 98 %  BP: (78-90)/(0) 80/0  Arterial Line BP: ()/(6-80) 89/64     Patient Vitals for the past 72 hrs (Last 3 readings):   Weight   12/05/23 0500 91.6 kg (202 lb)       Body mass index is 27.4 kg/m².      Intake/Output Summary (Last 24 hours) at 12/5/2023 1308  Last data filed at 12/5/2023 1300  Gross per 24 hour   Intake 2740.88 ml   Output 1065 ml   Net 1675.88 ml         Hemodynamic Parameters:  PAP: (39-51)/(18-37) 41/24  PAP (Mean):  [26 mmHg-40 mmHg] 31 mmHg    Telemetry: NSR        Physical Exam  Constitutional:       Appearance: He is ill-appearing.      Comments: Confused. Not following commands    HENT:      Head: Normocephalic and atraumatic.   Cardiovascular:      Rate and Rhythm: Normal rate and regular rhythm.      Comments: LVAD hum  Pulmonary:      Effort: Pulmonary effort is normal.      Breath sounds: Rales present.   Chest:      Comments: CT x2  Abdominal:      General: Abdomen is flat. Bowel sounds are normal. There is no distension.      Palpations: Abdomen is soft.   Musculoskeletal:         General: Normal range of motion.      Cervical back: Neck supple.   Skin:     General: Skin is warm and dry.   Neurological:      Mental Status: He is disoriented.      Comments: Not following commands. Jessica            Significant Labs:  CBC:  Recent Labs   Lab 12/05/23  0008 12/05/23  0143 12/05/23  0406 12/05/23  0450 12/05/23  0745 12/05/23  0914  12/05/23  1041 12/05/23  1232   WBC 14.73*  --  13.99*  --  13.35*  --   --   --    RBC 2.76*  --  2.50*  --  2.47*  --   --   --    HGB 8.1*  --  7.4*  --  7.3*  --   --   --    HCT 23.4*   < > 21.5*   < > 21.2* 22* 23* 30*     --  181  --  199  --   --   --    MCV 85  --  86  --  86  --   --   --    MCH 29.3  --  29.6  --  29.6  --   --   --    MCHC 34.6  --  34.4  --  34.4  --   --   --     < > = values in this interval not displayed.       BNP:  Recent Labs   Lab 11/29/23  1834 12/04/23 0412   * 285*       CMP:  Recent Labs   Lab 12/05/23  0008 12/05/23  0406 12/05/23  0745   * 177* 205*   CALCIUM 8.6* 8.4* 8.5*   ALBUMIN 2.1* 2.5* 2.5*   PROT 5.9* 6.0 6.0   * 137 136   K 3.8 3.9 4.0   CO2 16* 18* 17*    103 102   BUN 69* 69* 72*   CREATININE 3.1* 3.3* 3.5*   ALKPHOS 116 87 90   ALT 30 24 24   AST 39 37 38   BILITOT 1.0 1.1* 0.9        Coagulation:   Recent Labs   Lab 12/04/23  1801 12/05/23  0008 12/05/23  0406   INR 1.0 1.0 1.1   APTT 25.5 26.7 26.4       LDH:  Recent Labs   Lab 12/04/23  0412 12/05/23  0406   * 511*       Microbiology:  Microbiology Results (last 7 days)       Procedure Component Value Units Date/Time    Blood culture [[id number]] Collected: 11/28/23 1452    Order Status: Completed Specimen: Blood from Peripheral, Upper Arm, Right Updated: 12/03/23 1612     Blood Culture, Routine No growth after 5 days.    Blood culture [[id number]] Collected: 11/28/23 1450    Order Status: Completed Specimen: Blood from Peripheral, Forearm, Right Updated: 12/03/23 1612     Blood Culture, Routine No growth after 5 days.            BMP:   Recent Labs   Lab 12/05/23  0745   *      K 4.0      CO2 17*   BUN 72*   CREATININE 3.5*   CALCIUM 8.5*   MG 2.4       I have reviewed all pertinent labs within the past 24 hours.    Estimated Creatinine Clearance: 24.3 mL/min (A) (based on SCr of 3.5 mg/dL (H)).    Diagnostic Results:  I have reviewed all pertinent  imaging results/findings within the past 24 hours.  Assessment and Plan:     63 year old male with hx of CAD s/p 3v CABG (unclear anatomy, 2009), ICM with a recent EF of 15-20% s/p ICD (medmauricio 2009), DM2 (a1c 7.7), HTN, HLD, Vfib on amio who presents to the ED with CC of SOB     Pt was recently admitted to [facility] as a transfer.  He was started on a Lasix gtt and did well.  Started on gDMT and discharged home on  5 with plans to follow up in [facility] clinic for ongoing transplant evaluation at another facility.  He says that about a few days ago he started to notice LE swelling.  This turned into Nelson and orthopnea.  He says he can't walk to the bathroom without getting SOB.  He also complains of weight gain.  He takes torsemide 20mg BID at home and was told to trial additional Lasix which he did without any improvement.  He was rx metolazone but has not been filled.  He came to the ED     In the ED he was AF with stable VS on RA.  CBC showing chronic anemia.  CMP notable for acute on chronic CKD with baseline around 2.1 and Cr now 2.6.  BNP elevated.  Lactate neg.  CXR showing pulmonary edema.  I evaluated the pt at the bedside.  Bedside TTE showing CVP >15.  He was subsequently admitted to the CCU for diuresis.     He was continued on his home  and was started on a lasix gtt, which he responded well to overnight (net -1700cc. He feels much better this morning as well. Our transplant coordinators have been working on insurance approval and he is now being transferred to [facility] service for transplant evaluation.     * Acute on chronic combined systolic and diastolic CHF, NYHA class 4  Pt with known ICM with an EF of 25% on home  presented with decompensated HF.  Not in cardiogenic shock    RHC 11/14: RA 14, PA 70/35, PCWP 32, CO 4.1, CI 2.1.   Repeat RHC 11/20 RA RA  20  PA 60/29 (39)  PCWP 29    CO  4.6 l/min  CI  2.3 l/min/m2  2D echo 11/21 showed EF 15-20%, mild RV dysfunction, mild MR, LVEDD 6.9     Repeat on  11/24 showed EF 10-15%, RV mildly reduced, mild-mod MR, LVEDD 6.42  - Home  gtt at 5  - home GDMT: entresto 24/26mg BID (on hold 2/2 IVÁN), Hydralazine 25 q8h - held prior to LVAD implant   - home diuretics: Was taking lasix 40 bid  - Bridged to LVAD with IABP s/p HM3 implant 12/1        -PT/OT and nutrition   -s/p LVAD (12/1)  -CVP 12, PA 48/24 on IV Lasix gtt at 7.5 mg/hr, increased to 10 this morning. Remains on  5, epi 0.06, Ketty 5 ppm, and Cardene   - CTS primary      LVAD (left ventricular assist device) present  -HeartMate 3 Implanted  12/1/2023  as DT  -CTS Primary  -Implanted by Dr. Washington  -HOLD Coumadin,  Goal INR 2.0-3.0 . Subtherapeutic today. On Heparin gtt   -Antiplatelets Not on ASA  -LDH is stable overall today. Will continue to monitor daily.  -Speed set at  4800   rpm, LSL 4400 rpm   -Interrogation notable for no events  -Not listed for OHTx, declined for OHTX due to comorbidities       Procedure: Device Interrogation Including analysis of device parameters  Current Settings: Ventricular Assist Device  Review of device function is stable/unstable stable        12/5/2023     1:00 PM 12/5/2023    12:00 PM 12/5/2023    11:00 AM 12/5/2023    10:00 AM 12/5/2023     9:00 AM 12/5/2023     8:00 AM 12/5/2023     7:00 AM   TXP LVAD INTERROGATIONS   Type HeartMate3 HeartMate3 HeartMate3 HeartMate3 HeartMate3 HeartMate3 HeartMate3   Flow 3.7 3.7 3.8 3.9 3.7 3.8 3.7   Speed 4800 4800 4800 4800 4800 4800 4800   PI 4.5 4.3 4.2 3.8 4.2 4.1 4.2   Power (Carrington) 3.1 3.1 3.1 3.2 3.1 3.1 3.1    4400 4400 4400 4400 4400 4400   Pulsatility Intermittent pulse Intermittent pulse Intermittent pulse Intermittent pulse Intermittent pulse Intermittent pulse Intermittent pulse       PAF (paroxysmal atrial fibrillation)  Known hx of pAF. In sinus rhythm on admission, but 1 run of AF RVR overnight. He spontaneously converted.  - Continue home amiodarone 400mg qd  - Stop home Eliquis and continue heparin gtt while in  the hospital.     IVÁN (acute kidney injury)  IVÁN on CKD2. Baseline Cr of 1.7-2.0.   - Hold ARNblanca howardo  -Trend BMPs daily   -SrCr 3.5 today  -Plans to start CRRT today  -Nephrology was consulted     Type 2 diabetes mellitus without complication, without long-term current use of insulin  -A1c 7.6, not insulin dependent  - MDSSI  -Currently on Insulin gtt   - Endocrine following, appreciate assistance with blood glucose management    CAD (coronary artery disease)  -S/p 3vCABG in 2009  - home ASA, HI statin- on hold after surgery     Ventricular tachycardia  Hx VT s/p ICD placement (medtronic 2009)  - Continue home amio 400mg qd      .Uninterrupted Critical Care/Counseling Time (not including procedures): 45 mins     Hugh Duran NP  Heart Transplant  [facility] - [facility] Int[facility]

## 2023-12-05 NOTE — PLAN OF CARE
Was called by nurse requesting turning on tachytherapies post procedure.  Device: Medtronic  Changes: tachytherapies turned on. No further changes made.    Nabila Swan MD  Cardiovascular medicine; PGY5  [facility]  [address] [place] [address]

## 2023-12-05 NOTE — ASSESSMENT & PLAN NOTE
Pt with known ICM with an EF of 25% on home  presented with decompensated HF.  Not in cardiogenic shock    RHC 11/14: RA 14, PA 70/35, PCWP 32, CO 4.1, CI 2.1.   Repeat RHC 11/20 RA RA  20  PA 60/29 (39)  PCWP 29    CO  4.6 l/min  CI  2.3 l/min/m2  2D echo 11/21 showed EF 15-20%, mild RV dysfunction, mild MR, LVEDD 6.9     Repeat on 11/24 showed EF 10-15%, RV mildly reduced, mild-mod MR, LVEDD 6.42  - Home  gtt at 5  - home GDMT: entresto 24/26mg BID (on hold 2/2 IVÁN), Hydralazine 25 q8h - held prior to LVAD implant   - home diuretics: Was taking lasix 40 bid  - Bridged to LVAD with IABP s/p HM3 implant 12/1        -PT/OT and nutrition   -s/p LVAD (12/1)  -CVP 12, PA 48/24 on IV Lasix gtt at 7.5 mg/hr, increased to 10 this morning. Remains on  5, epi 0.06, Ketty 5 ppm, and Cardene   - CTS primary

## 2023-12-05 NOTE — NURSING
Attempted to initiate CRRT. Venous and arterial ports both sluggish upon aspiration- flow not good enough to initiate tx at this time. Notified Dr. Ayon- TPA was ordered.

## 2023-12-05 NOTE — PROGRESS NOTES
12/05/2023  [place]    Current provider:  Yuri Washington MD    Device interrogation:      12/5/2023    10:00 AM 12/5/2023     9:00 AM 12/5/2023     8:00 AM 12/5/2023     7:00 AM 12/5/2023     6:00 AM 12/5/2023     5:00 AM 12/5/2023     4:00 AM   TXP LVAD INTERROGATIONS   Type HeartMate3 HeartMate3 HeartMate3 HeartMate3  HeartMate3    Flow 3.9 3.7 3.8 3.7 3.8 3.8 3.8   Speed 4800 4800 4800 4800 4800 4800 4800   PI 3.8 4.2 4.1 4.2 4 3.9 3.8   Power (Carrington) 3.2 3.1 3.1 3.1 3.3 3.2 3.2   LSL 4400 4400 4400 4400 4400 4400 4400   Pulsatility Intermittent pulse Intermittent pulse Intermittent pulse Intermittent pulse Intermittent pulse Intermittent pulse Intermittent pulse          Rounded on [facility] to ensure all mechanical assist device settings (IABP or VAD) were appropriate and all parameters were within limits.  I was able to ensure all back up equipment was present, the staff had no issues, and the Perfusion Department daily rounding was complete.      For implantable VADs: Interrogation of Ventricular assist device was performed with analysis of device parameters and review of device function. I have personally reviewed the interrogation findings and agree with findings as stated.     In emergency, the nursing units have been notified to contact the perfusion department either by:  Calling [phone] from 630am to 4pm Mon thru Fri, utilizing the On-Call Finder functionality of Epic and searching for Perfusion, or by contacting the hospital  from 4pm to 630am and on weekends and asking to speak with the perfusionist on call.    12:48 PM

## 2023-12-05 NOTE — PT/OT/SLP PROGRESS
Speech Language Pathology  Pt not seen     Radha Abbott  MRN: [id number]    Patient not seen today secondary  to RN hold, pt extubated this date and not appropriate for swallow eval 2/2 not following commands. SLP spoke with wife to discuss when swallow eval will be performed and will follow up.  12/5/2023

## 2023-12-05 NOTE — SUBJECTIVE & OBJECTIVE
Interval History/Significant Events: Chest closure yesterday. Lasix gtt remains at 7.5,  5, epinephrine 0.06, cardene 5. Given 500cc albumin for base excess -6. UOP dropped after 9 PM to 20/hr. Lasix 40 given at 0430. LVAD at 4800 rpm, 3.8 L/min.    Follow-up For: Procedure(s) (LRB):  CLOSURE, WOUND, STERNUM (N/A)    Post-Operative Day: 1 Day Post-Op    Objective:     Vital Signs (Most Recent):  Temp: 99 °F (37.2 °C) (12/05/23 0300)  Pulse: 93 (12/05/23 0500)  Resp: (!) 21 (12/05/23 0234)  BP: (!) 84/0 (12/05/23 0300)  SpO2: 100 % (12/05/23 0500) Vital Signs (24h Range):  Temp:  [98.8 °F (37.1 °C)-99.9 °F (37.7 °C)] 99 °F (37.2 °C)  Pulse:  [] 93  Resp:  [16-21] 21  SpO2:  [97 %-100 %] 100 %  BP: (78-90)/(0) 84/0  Arterial Line BP: (79-99)/(58-80) 93/72     Weight: 91.6 kg (202 lb)  Body mass index is 27.4 kg/m².      Intake/Output Summary (Last 24 hours) at 12/5/2023 0527  Last data filed at 12/5/2023 0500  Gross per 24 hour   Intake 2776.87 ml   Output 1580 ml   Net 1196.87 ml          Physical Exam  Vitals and nursing note reviewed.   Constitutional:       General: He is not in acute distress.     Appearance: Normal appearance.   HENT:      Head: Normocephalic and atraumatic.      Mouth/Throat:      Mouth: Mucous membranes are moist.      Pharynx: Oropharynx is clear.   Cardiovascular:      Rate and Rhythm: Normal rate and regular rhythm.   Pulmonary:      Effort: Pulmonary effort is normal. No respiratory distress.      Breath sounds: Normal breath sounds.      Comments: intubated  Abdominal:      General: Abdomen is flat. There is no distension.      Palpations: Abdomen is soft.      Tenderness: There is no abdominal tenderness.   Genitourinary:     Comments: goodman  Musculoskeletal:         General: Normal range of motion.      Right lower leg: No edema.      Left lower leg: No edema.   Skin:     General: Skin is warm and dry.   Neurological:      Comments: sedated          Vents:  Vent Mode: A/C  (12/05/23 0450)  Ventilator Initiated: Yes (12/01/23 1529)  Set Rate: 16 BPM (12/05/23 0450)  Vt Set: 490 mL (12/05/23 0450)  PEEP/CPAP: 5 cmH20 (12/05/23 0450)  Oxygen Concentration (%): 40 (12/05/23 0500)  Peak Airway Pressure: 21 cmH20 (12/05/23 0450)  Plateau Pressure: 21 cmH20 (12/05/23 0450)  Total Ve: 8.49 L/m (12/05/23 0450)  Negative Inspiratory Force (cm H2O): 0 (12/05/23 0450)  F/VT Ratio<105 (RSBI): (!) 39.01 (12/04/23 1803)    Lines/Drains/Airways       Central Venous Catheter Line  Duration              Introducer with Double Lumen 12/01/23 1554 Internal Jugular Right 3 days    Pulmonary Artery Catheter Assessment  12/01/23 1556 Internal Jugular Right 3 days    Trialysis (Dialysis) Catheter 12/01/23 1530 left internal jugular 3 days              Drain  Duration                  Chest Tube Tube - 1 Right Mediastinal -- days         Chest Tube 12/01/23 Tube - 2 Left Mediastinal 4 days         Urethral Catheter 12/01/23 0754 Temperature probe;Silicone;Non-latex 14 Fr. 3 days         NG/OG Tube 12/04/23 1730 Left nostril <1 day              Airway  Duration                  Airway - Non-Surgical 12/01/23 1534 Endotracheal Tube 3 days              Arterial Line  Duration             Arterial Line 12/01/23 1500 Left Radial 3 days              Line  Duration                  VAD 12/01/23 1015 Left ventricular assist device HeartMate 3 3 days              Peripheral Intravenous Line  Duration                  Peripheral IV - Single Lumen 11/29/23 1100 20 G Left Forearm 5 days         Peripheral IV - Single Lumen 12/04/23 20 G Anterior;Right Forearm 1 day                    Significant Labs:    CBC/Anemia Profile:  Recent Labs   Lab 12/04/23  1801 12/04/23  1803 12/05/23  0008 12/05/23  0143 12/05/23  0339 12/05/23  0406 12/05/23  0450   WBC 13.87*  --  14.73*  --   --  13.99*  --    HGB 8.5*  --  8.1*  --   --  7.4*  --    HCT 24.4*   < > 23.4*   < > 25* 21.5* 20*     --  188  --   --  181  --    MCV 86   --  85  --   --  86  --    RDW 15.4*  --  15.6*  --   --  15.4*  --     < > = values in this interval not displayed.        Chemistries:  Recent Labs   Lab 12/04/23  1801 12/05/23  0008 12/05/23  0406   * 135* 137   K 3.9 3.8 3.9    103 103   CO2 18* 16* 18*   BUN 61* 69* 69*   CREATININE 2.9* 3.1* 3.3*   CALCIUM 8.3* 8.6* 8.4*   ALBUMIN 2.1* 2.1* 2.5*   PROT 6.0 5.9* 6.0   BILITOT 0.7 1.0 1.1*   ALKPHOS 91 116 87   ALT 30 30 24   AST 36 39 37   MG 2.4 2.3 2.4   PHOS 7.3* 7.5* 6.9*       ABGs:   Recent Labs   Lab 12/05/23  0450   PH 7.421   PCO2 28.8*   HCO3 18.7*   POCSATURATED 99   BE -6*       Significant Imaging:  I have reviewed all pertinent imaging results/findings within the past 24 hours.

## 2023-12-05 NOTE — PT/OT/SLP PROGRESS
Physical Therapy Missed Treatment Note      Patient Name:  Radha Abbott   MRN:  [id number]  Admitting Diagnosis:  Acute on chronic combined systolic and diastolic CHF, NYHA class 4   Recent Surgery: Procedure(s) (LRB):  CLOSURE, WOUND, STERNUM (N/A)  INSERTION, GRAFT, PERICARDIUM  DRAINAGE, PLEURAL EFFUSION 1 Day Post-Op  Admit Date: 11/9/2023  Length of Stay: 26 days    Patient not seen today due to Nurse/ ENRRIQUE hold. Pt extubated this AM. Pt remains restless in restraints and with increased O2 needs. Radha Abbott's plan of care and PT goals reviewed on this date and remain appropriate. Will follow-up for progressive mobility pending continued medical stability and patient participation.    Elizabeth Cee PT, DPT  12/5/2023  Pager: [phone]

## 2023-12-05 NOTE — SUBJECTIVE & OBJECTIVE
Scheduled Meds:   amiodarone  400 mg Per NG tube Daily    atorvastatin  40 mg Per NG tube Daily    ferrous gluconate  324 mg Per NG tube Daily with breakfast    mupirocin   Nasal BID    pantoprazole  40 mg Intravenous BID    polyethylene glycol  17 g Per NG tube Daily    QUEtiapine  50 mg Oral Daily    senna-docusate 8.6-50 mg  1 tablet Per NG tube BID    sodium chloride 0.9%  10 mL Intravenous Q6H     Continuous Infusions:   sodium chloride 0.9% 10 mL/hr at 23 1300    DOBUTamine 5 mcg/kg/min (23 1300)    EPINEPHrine 0.06 mcg/kg/min (23 1300)    furosemide (LASIX) 2 mg/mL continuous infusion (non-titrating) 7.5 mg/hr (23 1339)    heparin (porcine) in 5 % dex 200 Units/hr (23 1300)    insulin regular 1 units/mL infusion orderable (TRANSFER) 1.2 Units/hr (23 1300)    nicardipine 2.5 mg/hr (23 1300)    nitric oxide gas       PRN Meds:acetaminophen, albumin human 5%, albuterol sulfate, bisacodyL, dextrose 10%, dextrose 10%, [] fentaNYL **FOLLOWED BY** fentaNYL, gabapentin, glucagon (human recombinant), glucose, glucose, HYDROmorphone, insulin aspart U-100, magnesium hydroxide 400 mg/5 ml, magnesium sulfate IVPB, mupirocin, ondansetron, oxyCODONE, oxyCODONE, potassium chloride, potassium chloride, senna-docusate 8.6-50 mg, sodium chloride 0.9%, [place] PICC/Midline Protocol **AND** sodium chloride 0.9% **AND** sodium chloride 0.9%    Review of patient's allergies indicates:  No Known Allergies     Past Medical History:   Diagnosis Date    CAD (coronary artery disease)     Diabetes mellitus     HFrEF (heart failure with reduced ejection fraction)     ICD (implantable cardioverter-defibrillator) in place     MI, old      Past Surgical History:   Procedure Laterality Date    ANGIOPLASTY-VENOUS ARTERY Right 2023    Procedure: ANGIOPLASTY-VENOUS ARTERY, RIGHT FEMORAL;  Surgeon: Yuri Washington MD;  Location: [facility] OR [address] [place] [address];  Service: Cardiovascular;  Laterality: Right;     AORTIC VALVULOPLASTY N/A 12/1/2023    Procedure: REPAIR, AORTIC VALVE;  Surgeon: Yuri Washington MD;  Location: [facility] OR [facility] FLR;  Service: Cardiovascular;  Laterality: N/A;    CARDIAC SURGERY      CLOSURE N/A 12/1/2023    Procedure: CLOSURE, TEMPORARY;  Surgeon: Yuri Washington MD;  Location: [facility] OR [facility] FLR;  Service: Cardiovascular;  Laterality: N/A;    DRAINAGE OF PLEURAL EFFUSION  12/4/2023    Procedure: DRAINAGE, PLEURAL EFFUSION;  Surgeon: Yuri Washington MD;  Location: [facility] OR [facility] FLR;  Service: Cardiovascular;;    INSERTION OF GRAFT TO PERICARDIUM  12/4/2023    Procedure: INSERTION, GRAFT, PERICARDIUM;  Surgeon: Yuri Washington MD;  [facility];  Service: Cardiovascular;;    INSERTION OF INTRA-AORTIC BALLOON ASSIST DEVICE Right 11/21/2023    Procedure: INSERTION, INTRA-AORTIC BALLOON PUMP;  Surgeon: Finn Cohn MD;  Location: [facility] CATH LAB;  Service: Cardiology;  Laterality: Right;    LEFT VENTRICULAR ASSIST DEVICE Left 12/1/2023    Procedure: INSERTION-LEFT VENTRICULAR ASSIST DEVICE;  Surgeon: Yuri Washington MD;  [facility];  Service: Cardiovascular;  Laterality: Left;  REDO STERNOTOMY - REDO SAW NEEDED FOR CASE    LYSIS OF ADHESIONS  12/1/2023    Procedure: LYSIS, ADHESIONS;  Surgeon: Yuri Washington MD;  [facility];  Service: Cardiovascular;;    PLACEMENT OF SWAN ROLANDO CATHETER WITH IMAGING GUIDANCE  11/20/2023    Procedure: INSERTION, CATHETER, SWAN-ROLANDO, WITH IMAGING GUIDANCE;  Surgeon: Sajan Hurley MD;  Location: [facility] CATH LAB;  Service: Cardiology;;    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 12/1/2023    Procedure: REPAIR, ANEURYSM, ARTERY, FEMORAL;  Surgeon: Yuri Washington MD;  [facility];  Service: Cardiovascular;  Laterality: Right;  Right Femoral Artery Repair    RIGHT HEART CATHETERIZATION Right 10/10/2023    Procedure: INSERTION, CATHETER, RIGHT HEART;  Surgeon: Bin Gandhi MD;  Location: [facility] CATH LAB;   Service: Cardiology;  Laterality: Right;    RIGHT HEART CATHETERIZATION Right 10/13/2023    Procedure: INSERTION, CATHETER, RIGHT HEART;  Surgeon: Walter Mcintyre MD;  Location: [facility] CATH LAB;  Service: Cardiology;  Laterality: Right;    RIGHT HEART CATHETERIZATION  11/13/2023    RIGHT HEART CATHETERIZATION Right 11/13/2023    Procedure: INSERTION, CATHETER, RIGHT HEART;  Surgeon: Juventino Bermudez Jr., MD;  Location: [facility] CATH LAB;  Service: Cardiology;  Laterality: Right;    RIGHT HEART CATHETERIZATION Right 11/20/2023    Procedure: INSERTION, CATHETER, RIGHT HEART;  Surgeon: Sajan Hurley MD;  Location: [facility] CATH LAB;  Service: Cardiology;  Laterality: Right;    STERNAL WOUND CLOSURE N/A 12/4/2023    Procedure: CLOSURE, WOUND, STERNUM;  Surgeon: Yuri Washington MD;  Location: [facility] OR [facility] FLR;  Service: Cardiovascular;  Laterality: N/A;    STERNOTOMY N/A 12/1/2023    Procedure: STERNOTOMY, REDO;  Surgeon: Yuri Washington MD;  Location: [facility] OR [facility] FLR;  Service: Cardiovascular;  Laterality: N/A;    VALVULOPLASTY, MITRAL VALVE N/A 12/1/2023    Procedure: VALVULOPLASTY, MITRAL VALVE;  Surgeon: Yuri Washington MD;  Location: [facility] OR [facility] FLR;  Service: Cardiovascular;  Laterality: N/A;       Family History    None       Tobacco Use    Smoking status: Every Day     Current packs/day: 0.50     Types: Cigarettes    Smokeless tobacco: Not on file   Substance and Sexual Activity    Alcohol use: Yes     Comment: rarely    Drug use: No    Sexual activity: Not on file     Review of Systems   Skin:  Positive for wound.       Objective:     Vital Signs (Most Recent):  Temp: 98.2 °F (36.8 °C) (12/05/23 1200)  Pulse: 97 (12/05/23 1300)  Resp: (!) 32 (12/05/23 1300)  BP: (!) 80/0 (12/05/23 1200)  SpO2: 98 % (12/05/23 1300) Vital Signs (24h Range):  Temp:  [98.2 °F (36.8 °C)-99.9 °F (37.7 °C)] 98.2 °F (36.8 °C)  Pulse:  [] 97  Resp:  [16-38] 32  SpO2:  [90 %-100 %] 98 %  BP: (78-90)/(0) 80/0  Arterial Line BP:  ()/(6-80) 89/64     Weight: 91.6 kg (202 lb)  Body mass index is 27.4 kg/m².     Physical Exam  Constitutional:       Appearance: Normal appearance.   Skin:     General: Skin is warm and dry.      Findings: Lesion present.   Neurological:      Mental Status: He is alert.          Laboratory:  All pertinent labs reviewed within the last 24 hours.    Diagnostic Results:  None

## 2023-12-05 NOTE — CARE UPDATE
Care Update:    Patient has had previous PICC placed in right basilic vein for dobutamine administration at home in Oct 2023. Wife understands risks of PICC placement including difficulty of AV fistula creation should patient need HD in the future given hx of CKD. Would like to proceed with PICC placement.    Melanie Padilla M.D.  General Surgery PGY-II  [facility]

## 2023-12-05 NOTE — PLAN OF CARE
Recommendations:     1.) Recommend is and when medically feasible, recommend to ADAT with goal of Low Na diet, texture per SLP, fluid per MD     - add renal modifiers if labs do not improve.      2.) If alternative nutrition is medically warranted, recommend   Novasource renal 40 ml/hr to provide 1920 kcal, 87 g pro, 688 mL water.      - Bolus TF rec: Novasource renal 4 cans/d to provide 1900 kcal, 86 g pro, 680 mL water.          Goals: Meet % of EEN/EPN by RD f/u date  Nutrition Goal Status: goal not met  Communication of RD Recs:  (POC)

## 2023-12-05 NOTE — CONSULTS
[facility]  Nephrology  Consult Note    Patient Name: Radha Abbott  MRN: [id number]  Admission Date: 11/9/2023  Hospital Length of Stay: 26 days  Attending Provider: Yuri Washington MD   Primary Care Physician: Vasu Kong MD  Principal Problem:Acute on chronic combined systolic and diastolic CHF, NYHA class 4    Inpatient consult to Nephrology  Consult performed by: Henok Ayon MD  Consult ordered by: Yuri Washington MD        Subjective:     HPI: Mr. Abbott is a 63-year-old man with ischemic cardiomyopathy with most recent TTE showing EF 10 -15% and G3DD s/p [employer] ICM placement on chronic dobutamine infusion, CAD s/p x3 vessel CABG back in 2009, type 2 diabetes mellitus (most recent hemoglobin A1c 7.6%), hypertension, HLD, history of ventricular fibrillation for which he is on amiodarone and CKD IIIb (baseline creatinine ~2-2.2) who was initially admitted on 11/9 with heart failure exacerbation and hypervolemia for which he was managed with inotrope with dobutamine in addition to IV diuresis. He ultimately underwent IABP placed on 11/21 for mechanical hemodynamic support and subsequently underwent LVAD placement on 12/1. His renal function appears to be mostly stable with IVÁN and creatinine in mid 2s to 3s in addition he is still making urine with Lasix infusion however on 12/5 Nephrology consulted given concern for uremia with BUN 72.     Past Medical History:   Diagnosis Date    CAD (coronary artery disease)     Diabetes mellitus     HFrEF (heart failure with reduced ejection fraction)     ICD (implantable cardioverter-defibrillator) in place     MI, old        Past Surgical History:   Procedure Laterality Date    ANGIOPLASTY-VENOUS ARTERY Right 12/1/2023    Procedure: ANGIOPLASTY-VENOUS ARTERY, RIGHT FEMORAL;  Surgeon: Yuri Washington MD;  Location: [facility] OR [address] [place] [address];  Service: Cardiovascular;  Laterality: Right;    AORTIC VALVULOPLASTY N/A 12/1/2023    Procedure: REPAIR,  AORTIC VALVE;  Surgeon: Yuri Washington MD;  Location: [facility] OR [facility] FLR;  Service: Cardiovascular;  Laterality: N/A;    CARDIAC SURGERY      CLOSURE N/A 12/1/2023    Procedure: CLOSURE, TEMPORARY;  Surgeon: Yuri Washington MD;  Location: [facility] OR 2ND FLR;  Service: Cardiovascular;  Laterality: N/A;    DRAINAGE OF PLEURAL EFFUSION  12/4/2023    Procedure: DRAINAGE, PLEURAL EFFUSION;  Surgeon: Yuri Washington MD;  Location: [facility] OR [facility] FLR;  Service: Cardiovascular;;    INSERTION OF GRAFT TO PERICARDIUM  12/4/2023    Procedure: INSERTION, GRAFT, PERICARDIUM;  Surgeon: Yuri Washington MD;  [facility];  Service: Cardiovascular;;    INSERTION OF INTRA-AORTIC BALLOON ASSIST DEVICE Right 11/21/2023    Procedure: INSERTION, INTRA-AORTIC BALLOON PUMP;  Surgeon: Finn Cohn MD;  Location: [facility] CATH LAB;  Service: Cardiology;  Laterality: Right;    LEFT VENTRICULAR ASSIST DEVICE Left 12/1/2023    Procedure: INSERTION-LEFT VENTRICULAR ASSIST DEVICE;  Surgeon: Yuri Washington MD;  [facility];  Service: Cardiovascular;  Laterality: Left;  REDO STERNOTOMY - REDO SAW NEEDED FOR CASE    LYSIS OF ADHESIONS  12/1/2023    Procedure: LYSIS, ADHESIONS;  Surgeon: Yuri Washington MD;  [facility];  Service: Cardiovascular;;    PLACEMENT OF SWAN ROLANDO CATHETER WITH IMAGING GUIDANCE  11/20/2023    Procedure: INSERTION, CATHETER, SWAN-ROLANDO, WITH IMAGING GUIDANCE;  Surgeon: Sajan Hurley MD;  Location: [facility] CATH LAB;  Service: Cardiology;;    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 12/1/2023    Procedure: REPAIR, ANEURYSM, ARTERY, FEMORAL;  Surgeon: Yuri Washington MD;  [facility];  Service: Cardiovascular;  Laterality: Right;  Right Femoral Artery Repair    RIGHT HEART CATHETERIZATION Right 10/10/2023    Procedure: INSERTION, CATHETER, RIGHT HEART;  Surgeon: Bin Gandhi MD;  Location: [facility] CATH LAB;  Service: Cardiology;  Laterality: Right;    RIGHT HEART  CATHETERIZATION Right 10/13/2023    Procedure: INSERTION, CATHETER, RIGHT HEART;  Surgeon: Walter Mcintyre MD;  Location: [facility] CATH LAB;  Service: Cardiology;  Laterality: Right;    RIGHT HEART CATHETERIZATION  11/13/2023    RIGHT HEART CATHETERIZATION Right 11/13/2023    Procedure: INSERTION, CATHETER, RIGHT HEART;  Surgeon: Juventino Bermudez Jr., MD;  Location: [facility] CATH LAB;  Service: Cardiology;  Laterality: Right;    RIGHT HEART CATHETERIZATION Right 11/20/2023    Procedure: INSERTION, CATHETER, RIGHT HEART;  Surgeon: Sajan Hurley MD;  Location: [facility] CATH LAB;  Service: Cardiology;  Laterality: Right;    STERNAL WOUND CLOSURE N/A 12/4/2023    Procedure: CLOSURE, WOUND, STERNUM;  Surgeon: Yuri Washington MD;  Location: [facility] OR 2ND FLR;  Service: Cardiovascular;  Laterality: N/A;    STERNOTOMY N/A 12/1/2023    Procedure: STERNOTOMY, REDO;  Surgeon: Yuri Washington MD;  Location: [facility] OR 2ND FLR;  Service: Cardiovascular;  Laterality: N/A;    VALVULOPLASTY, MITRAL VALVE N/A 12/1/2023    Procedure: VALVULOPLASTY, MITRAL VALVE;  Surgeon: Yuri Washington MD;  Location: [facility] OR 2ND FLR;  Service: Cardiovascular;  Laterality: N/A;       Review of patient's allergies indicates:  No Known Allergies  Current Facility-Administered Medications   Medication Frequency    0.9%  NaCl infusion Continuous    acetaminophen tablet 650 mg Q6H PRN    albumin human 5% bottle 25 g PRN    albuterol sulfate nebulizer solution 2.5 mg Q4H PRN    amiodarone tablet 400 mg Daily    atorvastatin tablet 40 mg Daily    bisacodyL suppository 10 mg Daily PRN    dextrose 10% bolus 125 mL 125 mL PRN    dextrose 10% bolus 250 mL 250 mL PRN    DOBUtamine 1000 mg in D5W 250 mL infusion Continuous    EPINEPHrine 5 mg in dextrose 5% 250 mL infusion (premix) Continuous    fentaNYL 50 mcg/mL injection 25 mcg Q1H PRN    ferrous gluconate tablet 324 mg Daily with breakfast    furosemide (LASIX) 200 mg in sodium chloride 0.9% SolP 100 mL  continuous infusion (conc: 2 mg/mL) Continuous    gabapentin capsule 300 mg Nightly PRN    glucagon (human recombinant) injection 1 mg PRN    glucose chewable tablet 16 g PRN    glucose chewable tablet 24 g PRN    heparin 25,000 units in dextrose 5% 250 mL (100 units/mL) infusion (heparin infusion - NO NOMOGRAM) Continuous    HYDROmorphone injection 0.5 mg Q2H PRN    insulin aspart U-100 pen 0-5 Units PRN    insulin regular in 0.9 % NaCl 100 unit/100 mL (1 unit/mL) infusion Continuous    magnesium hydroxide 400 mg/5 ml suspension 2,400 mg Daily PRN    magnesium sulfate 2g in water 50mL IVPB (premix) PRN    mupirocin 2 % ointment On Call Procedure    mupirocin 2 % ointment BID    niCARdipine 40 mg/200 mL (0.2 mg/mL) infusion Continuous    nitric oxide gas Gas 10 ppm Continuous    ondansetron disintegrating tablet 8 mg Q8H PRN    oxyCODONE immediate release tablet 5 mg Q4H PRN    oxyCODONE immediate release tablet Tab 10 mg Q4H PRN    pantoprazole injection 40 mg BID    polyethylene glycol packet 17 g Daily    potassium chloride 20 mEq in 100 mL IVPB (FOR CENTRAL LINE ADMINISTRATION ONLY) PRN    potassium chloride 20 mEq in 100 mL IVPB (FOR CENTRAL LINE ADMINISTRATION ONLY) PRN    QUEtiapine tablet 50 mg Daily    senna-docusate 8.6-50 mg per tablet 1 tablet BID    senna-docusate 8.6-50 mg per tablet 2 tablet BID PRN    sodium chloride 0.9% flush 10 mL PRN    sodium chloride 0.9% flush 10 mL Q6H    And    sodium chloride 0.9% flush 10 mL PRN     Family History    None       Tobacco Use    Smoking status: Every Day     Current packs/day: 0.50     Types: Cigarettes    Smokeless tobacco: Not on file   Substance and Sexual Activity    Alcohol use: Yes     Comment: rarely    Drug use: No    Sexual activity: Not on file     Review of Systems   Unable to perform ROS: Mental status change (patient appears encephalopathic on exam)     Objective:     Vital Signs (Most Recent):  Temp: 98.2 °F (36.8 °C) (12/05/23 1200)  Pulse: 97  (12/05/23 1300)  Resp: (!) 32 (12/05/23 1300)  BP: (!) 80/0 (12/05/23 1200)  SpO2: 98 % (12/05/23 1300) Vital Signs (24h Range):  Temp:  [98.2 °F (36.8 °C)-99.9 °F (37.7 °C)] 98.2 °F (36.8 °C)  Pulse:  [] 97  Resp:  [16-38] 32  SpO2:  [90 %-100 %] 98 %  BP: (78-90)/(0) 80/0  Arterial Line BP: ()/(6-80) 89/64     Weight: 91.6 kg (202 lb) (12/05/23 0500)  Body mass index is 27.4 kg/m².  Body surface area is 2.16 meters squared.    I/O last 3 completed shifts:  In: 3794.7 [I.V.:2694.6; Blood:700; NG/GT:100; IV Piggyback:300.1]  Out: 3122 [Urine:2715; Chest Tube:407]     Physical Exam  Vitals and nursing note reviewed.   Constitutional:       General: He is awake. He is not in acute distress.     Appearance: He is ill-appearing. He is not diaphoretic.      Interventions: Nasal cannula in place.   HENT:      Head: Normocephalic and atraumatic.      Right Ear: External ear normal.      Left Ear: External ear normal.      Nose:      Comments: NG tube to left nostril.     Mouth/Throat:      Mouth: Mucous membranes are moist.      Pharynx: Oropharynx is clear. No oropharyngeal exudate or posterior oropharyngeal erythema.   Eyes:      General: No scleral icterus.        Right eye: No discharge.         Left eye: No discharge.      Conjunctiva/sclera: Conjunctivae normal.   Neck:      Comments: Trialysis catheter to left internal jugular vein. [place] Siva catheter in place.  Cardiovascular:      Rate and Rhythm: Normal rate.      Heart sounds: Murmur heard.      Systolic murmur is present.      No friction rub. No gallop.   Pulmonary:      Effort: Tachypnea present.      Breath sounds: Rales present. No wheezing.      Comments: Bibasilar crackles appreciated.   Chest:      Comments: Two chest tubes in place with LVAD present. Chest remains open at this time.  Abdominal:      General: Bowel sounds are normal. There is no distension.      Palpations: Abdomen is soft.   Genitourinary:     Comments: [place] catheter in  place.  Musculoskeletal:      Cervical back: Neck supple.      Right lower leg: No edema.      Left lower leg: No edema.   Skin:     General: Skin is warm and dry.      Coloration: Skin is not jaundiced.   Neurological:      Mental Status: He is lethargic and confused.          Significant Labs:  ABGs:   Recent Labs   Lab 12/05/23  1232   PH 7.394   PCO2 25.3*   HCO3 15.5*   POCSATURATED 96   BE -9*     BMP:   Recent Labs   Lab 12/05/23  0745   *      K 4.0      CO2 17*   BUN 72*   CREATININE 3.5*   CALCIUM 8.5*   MG 2.4     CBC:   Recent Labs   Lab 12/05/23  0745 12/05/23  0914 12/05/23  1232   WBC 13.35*  --   --    RBC 2.47*  --   --    HGB 7.3*  --   --    HCT 21.2*   < > 30*     --   --    MCV 86  --   --    MCH 29.6  --   --    MCHC 34.4  --   --     < > = values in this interval not displayed.     CMP:   Recent Labs   Lab 12/05/23  0745   *   CALCIUM 8.5*   ALBUMIN 2.5*   PROT 6.0      K 4.0   CO2 17*      BUN 72*   CREATININE 3.5*   ALKPHOS 90   ALT 24   AST 38   BILITOT 0.9     Coagulation:   Recent Labs   Lab 12/05/23  0406   INR 1.1   APTT 26.4     LFTs:   Recent Labs   Lab 12/05/23  0745   ALT 24   AST 38   ALKPHOS 90   BILITOT 0.9   PROT 6.0   ALBUMIN 2.5*     Microbiology Results (last 7 days)       Procedure Component Value Units Date/Time    Blood culture [[id number]] Collected: 11/28/23 1452    Order Status: Completed Specimen: Blood from Peripheral, Upper Arm, Right Updated: 12/03/23 1612     Blood Culture, Routine No growth after 5 days.    Blood culture [[id number]] Collected: 11/28/23 1450    Order Status: Completed Specimen: Blood from Peripheral, Forearm, Right Updated: 12/03/23 1612     Blood Culture, Routine No growth after 5 days.          Specimen (24h ago, onward)       Start     Ordered    12/04/23 1530  Specimen to Pathology, Surgery Cardiovascular  Once        Comments: Pre-op Diagnosis: HFrEF (heart failure with reduced ejection fraction)  [I50.20]Acute on chronic combined systolic and diastolic CHF, NYHA class 4 [I50.43]Procedure(s):CLOSURE, WOUND, STERNUMINSERTION-RIGHT VENTRICULAR ASSIST DEVICE Number of specimens:2Name of specimens: 1. Two large laps sponges  for gross anatomy for permanent     References:    Click here for ordering Quick Tip   Question Answer Comment   Procedure Type: Cardiovascular    Which provider would you like to cc? TEDDY BERG    Release to patient Immediate        12/04/23 1532                  Urinary sediment on 12/05/2023:                              Significant Imaging:  I have reviewed all imagining in the last 24 hours.  Assessment/Plan:     Cardiac/Vascular  * Acute on chronic combined systolic and diastolic CHF, NYHA class 4  LVAD (left ventricular assist device) present  Pre-transplant evaluation for heart transplant  Patient is identified as having Combined Systolic and Diastolic heart failure that is Acute on chronic. CHF is currently uncontrolled due to JVD, Rales/crackles on pulmonary exam, and Pulmonary edema/pleural effusion on CXR. Latest ECHO performed and demonstrates- Results for orders placed during the hospital encounter of 11/09/23    Echo    Interpretation Summary    Left Ventricle: The left ventricle is moderately dilated. Mildly increased ventricular mass. Normal wall thickness. There is eccentric hypertrophy. Severe global hyperkinesis present. There is severely reduced systolic function with a visually estimated ejection fraction of 10 -15%. Grade III diastolic dysfunction.    Right Ventricle: Normal right ventricular cavity size. Systolic function is normal. Pacemaker lead present in the ventricle.    Left Atrium: Left atrium is severely dilated.    Mitral Valve: There is annular and bileaflet tethering. There is moderate regurgitation with a centrally directed jet.    Tricuspid Valve: There is mild regurgitation with a centrally directed jet.    Pulmonary Artery: The estimated pulmonary  artery systolic pressure is 41 mmHg.    IVC/SVC: Normal venous pressure at 3 mmHg.    last BNP reviewed- and noted below   Recent Labs   Lab 12/04/23  0412   *     - management per primary team  - LVAD for mechanical support in addition to dobutamine and epinephrine for ionotropic support  - UF with SLED    Renal/  Acute renal failure with acute tubular necrosis superimposed on stage 3b chronic kidney disease  - suspect acute tubular injury secondary to hypotension/cardiogenic shock likely compounded by intra-arterial contrast and anemia with urinary sediment with granular casts  - will proceed with RRT for next 24 hours with 12 hours of SLED followed by 12 hours of SCUFF for metabolic clearance and volume management (goal to keep net even to assess tolerance), consent obtained via wife at bedside  - Q8H RFPs and magnesium levels per SLED protocol   - renal diet/tube feeds when not NPO, volume restriction per primary team  - strict I/O's and daily weights  - keep MAP > 65 mmHg  - renally all dose medications to eGFR  - avoid nephrotoxic agents wean feasible (i.e. NSAIDs, intra-arterial contrast, supra-therapeutic vancomycin levels, etc.)    Thank you for your consult. I will follow-up with patient. Please contact us if you have any additional questions.    Henok Ayon MD  Nephrology  [facility] - Surgical Int[facility]

## 2023-12-05 NOTE — PLAN OF CARE
SICU PLAN OF CARE NOTE    Dx: Acute on chronic combined systolic and diastolic CHF, NYHA class 4    Shift Events: 500 albumin given for low urine output (no significant response), then 40 mg lasix push given. UO remains 20-30 cc/hr. 1 amp bicarb given for HCO3 16.1 on istat, ABG's hourly since. SBT this am. Not following commands, but pulling good tidal volumes.    Goals of Care: Goal to extubate today, monitor renal function    Neuro: Moves All Extremities and Confused    Vital Signs: BP (!) 84/0 (BP Location: Right arm, Patient Position: Lying)   Pulse 98   Temp 99 °F (37.2 °C) (Oral)   Resp (!) 34   Ht 6' (1.829 m)   Wt 91.6 kg (202 lb)   SpO2 96%   BMI 27.40 kg/m²     Cardiac: SR-ST 100s    Respiratory: Ventilator 40% 5 PEEP spontaneous with 5 Ketty    Diet: NPO    Gtts: Precedex, Insulin, Epinephrine, Dobutamine, Lasix, and Heparin    Urine Output: Urinary Catheter 20-30 cc/hour    Drains: Chest Tube, total output 10-20 cc /  hour     Labs/Accuchecks: q1 ABGs, q4 Chemistry and accuchecks, q6 coags,     Skin: No new skin breakdown noted. Foam in place on sacrum. Immerse mattress in use. Turned q2 hours.

## 2023-12-05 NOTE — NURSING
Patient continues to be restless, moving around in bed. Unable to follow commands, has purposeful movements of all extremities. Routine ABG performed. O2 low. Will increase oxygent to 10L bubble flow nasal cannula. Will monitor pulse ox sats, abg in 1 hour. Dr. Padilla notified. Will continue to monitor.

## 2023-12-05 NOTE — ASSESSMENT & PLAN NOTE
IVÁN on CKD2. Baseline Cr of 1.7-2.0.   - Hold ARNi, entresto  -Trend BMPs daily   -SrCr 3.5 today  -Plans to start CRRT today  -Nephrology was consulted

## 2023-12-05 NOTE — ASSESSMENT & PLAN NOTE
-HeartMate 3 Implanted  12/1/2023  as DT  -CTS Primary  -Implanted by Dr. Washington  -HOLD Coumadin,  Goal INR 2.0-3.0 . Subtherapeutic today. On Heparin gtt   -Antiplatelets Not on ASA  -LDH is stable overall today. Will continue to monitor daily.  -Speed set at  4800   rpm, LSL 4400 rpm   -Interrogation notable for no events  -Not listed for OHTx, declined for OHTX due to comorbidities         Procedure: Device Interrogation Including analysis of device parameters  Current Settings: Ventricular Assist Device  Review of device function is stable/unstable stable        12/5/2023     1:00 PM 12/5/2023    12:00 PM 12/5/2023    11:00 AM 12/5/2023    10:00 AM 12/5/2023     9:00 AM 12/5/2023     8:00 AM 12/5/2023     7:00 AM   TXP LVAD INTERROGATIONS   Type HeartMate3 HeartMate3 HeartMate3 HeartMate3 HeartMate3 HeartMate3 HeartMate3   Flow 3.7 3.7 3.8 3.9 3.7 3.8 3.7   Speed 4800 4800 4800 4800 4800 4800 4800   PI 4.5 4.3 4.2 3.8 4.2 4.1 4.2   Power (Carrington) 3.1 3.1 3.1 3.2 3.1 3.1 3.1    4400 4400 4400 4400 4400 4400   Pulsatility Intermittent pulse Intermittent pulse Intermittent pulse Intermittent pulse Intermittent pulse Intermittent pulse Intermittent pulse

## 2023-12-05 NOTE — PROGRESS NOTES
Interdisciplinary Rounds Report:   Attended interdisciplinary rounds with the [facility]/CTS services including the LVAD Coordinators, social workers, cardiologists, surgeons,  PT/OT/Speech, dietician, and unit charge nurses. Discussed patient status, plan of care, goals of care, including DC date, and post discharge needs. Plan of care will be discussed with the patient and/or family per the physician while rounding on the floor. This is a recurring meeting that is medically and socially necessary to collaborate with the interdisciplinary team to assist patient needs and safe discharge.

## 2023-12-05 NOTE — ASSESSMENT & PLAN NOTE
BG goal 140-180     - Increase transition drip to 1.2 units/hr with step-down parameters.   - Novolog (aspart) insulin prn for BG excursions LDC (150/50)   - BG checks q4hr while NPO   - Hypoglycemia protocol in place    ** Please notify Endocrine for any change and/or advance in diet**  ** Please call Endocrine for any BG related issues **    Discharge Planning:   TBD. Please notify endocrinology prior to discharge.

## 2023-12-05 NOTE — SUBJECTIVE & OBJECTIVE
"Interval HPI:   No acute events overnight. Patient in room 78889/89084 A. Blood glucose stable. BG above goal on current insulin regimen (Transition Insulin Drip). Steroid use- None .   1 Day Post-Op  Renal function- Abnormal - 3.5   Vasopressors-  Epinephrine     Diet NPO Except for: Medication     Eating:   NPO  Nausea: No  Hypoglycemia and intervention: No  Fever: No  TPN and/or TF: No    BP (!) 78/0   Pulse 93   Temp 99.2 °F (37.3 °C)   Resp (!) 25   Ht 6' (1.829 m)   Wt 91.6 kg (202 lb)   SpO2 97%   BMI 27.40 kg/m²     Labs Reviewed and Include    Recent Labs   Lab 12/05/23  0745   *   CALCIUM 8.5*   ALBUMIN 2.5*   PROT 6.0      K 4.0   CO2 17*      BUN 72*   CREATININE 3.5*   ALKPHOS 90   ALT 24   AST 38   BILITOT 0.9     Lab Results   Component Value Date    WBC 13.35 (H) 12/05/2023    HGB 7.3 (L) 12/05/2023    HCT 22 (L) 12/05/2023    MCV 86 12/05/2023     12/05/2023     No results for input(s): "TSH", "FREET4" in the last 168 hours.  Lab Results   Component Value Date    HGBA1C 7.6 (H) 10/12/2023       Nutritional status:   Body mass index is 27.4 kg/m².  Lab Results   Component Value Date    ALBUMIN 2.5 (L) 12/05/2023    ALBUMIN 2.5 (L) 12/05/2023    ALBUMIN 2.1 (L) 12/05/2023     Lab Results   Component Value Date    PREALBUMIN 19 (L) 12/01/2023    PREALBUMIN 27 11/15/2023       Estimated Creatinine Clearance: 24.3 mL/min (A) (based on SCr of 3.5 mg/dL (H)).    Accu-Checks  Recent Labs     12/03/23 2052 12/04/23  0013 12/04/23  0410 12/04/23  0724 12/04/23  1204 12/04/23  1800 12/04/23  2021 12/05/23  0008 12/05/23  0405 12/05/23  0731   POCTGLUCOSE 148* 142* 129* 101 105 134* 172* 237* 174* 214*       Current Medications and/or Treatments Impacting Glycemic Control  Immunotherapy:    Immunosuppressants       None          Steroids:   Hormones (From admission, onward)      None          Pressors:    Autonomic Drugs (From admission, onward)      Start     Stop Route " Frequency Ordered    12/01/23 1625  EPINEPHrine 5 mg in dextrose 5% 250 mL infusion (premix)        Question Answer Comment   Begin at (in mcg/kg/min): 0.1    Titrate by: (in mcg/kg/min) 0.01    Titrate interval: (in minutes) 5    Titrate to maintain: (SBP or MAP or Cardiac Index) MAP    Greater than: (in mmHg) 65    Maximum dose: (in mcg/kg/min) 2        -- IV Continuous 12/01/23 1625          Hyperglycemia/Diabetes Medications:   Antihyperglycemics (From admission, onward)      Start     Stop Route Frequency Ordered    12/04/23 1745  insulin regular in 0.9 % NaCl 100 unit/100 mL (1 unit/mL) infusion        Question:  Enter initial dose (Units/hr):  Answer:  1    -- IV Continuous 12/04/23 1742 12/03/23 0852  insulin aspart U-100 pen 0-5 Units         -- SubQ As needed (PRN) 12/03/23 0758

## 2023-12-05 NOTE — SUBJECTIVE & OBJECTIVE
Past Medical History:   Diagnosis Date    CAD (coronary artery disease)     Diabetes mellitus     HFrEF (heart failure with reduced ejection fraction)     ICD (implantable cardioverter-defibrillator) in place     MI, old        Past Surgical History:   Procedure Laterality Date    ANGIOPLASTY-VENOUS ARTERY Right 12/1/2023    Procedure: ANGIOPLASTY-VENOUS ARTERY, RIGHT FEMORAL;  Surgeon: Yuri Washington MD;  Location: [facility] OR [address] [place] [address];  Service: Cardiovascular;  Laterality: Right;    AORTIC VALVULOPLASTY N/A 12/1/2023    Procedure: REPAIR, AORTIC VALVE;  Surgeon: Yuri Washington MD;  [facility];  Service: Cardiovascular;  Laterality: N/A;    CARDIAC SURGERY      CLOSURE N/A 12/1/2023    Procedure: CLOSURE, TEMPORARY;  Surgeon: Yuri Washington MD;  [facility];  Service: Cardiovascular;  Laterality: N/A;    DRAINAGE OF PLEURAL EFFUSION  12/4/2023    Procedure: DRAINAGE, PLEURAL EFFUSION;  Surgeon: Yuri Washington MD;  [facility];  Service: Cardiovascular;;    INSERTION OF GRAFT TO PERICARDIUM  12/4/2023    Procedure: INSERTION, GRAFT, PERICARDIUM;  Surgeon: Yuri Washington MD;  [facility];  Service: Cardiovascular;;    INSERTION OF INTRA-AORTIC BALLOON ASSIST DEVICE Right 11/21/2023    Procedure: INSERTION, INTRA-AORTIC BALLOON PUMP;  Surgeon: Finn Cohn MD;  Location: [facility] CATH LAB;  Service: Cardiology;  Laterality: Right;    LEFT VENTRICULAR ASSIST DEVICE Left 12/1/2023    Procedure: INSERTION-LEFT VENTRICULAR ASSIST DEVICE;  Surgeon: Yuri Washington MD;  [facility];  Service: Cardiovascular;  Laterality: Left;  REDO STERNOTOMY - REDO SAW NEEDED FOR CASE    LYSIS OF ADHESIONS  12/1/2023    Procedure: LYSIS, ADHESIONS;  Surgeon: Yuri Washington MD;  [facility];  Service: Cardiovascular;;    PLACEMENT OF SWAN ROLANDO CATHETER WITH IMAGING GUIDANCE  11/20/2023    Procedure: INSERTION, CATHETER, SWAN-ROLANDO, WITH IMAGING  GUIDANCE;  Surgeon: Sajan Hurley MD;  Location: [facility] CATH LAB;  Service: Cardiology;;    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 12/1/2023    Procedure: REPAIR, ANEURYSM, ARTERY, FEMORAL;  Surgeon: Yuri Washington MD;  [facility];  Service: Cardiovascular;  Laterality: Right;  Right Femoral Artery Repair    RIGHT HEART CATHETERIZATION Right 10/10/2023    Procedure: INSERTION, CATHETER, RIGHT HEART;  Surgeon: Bin Gandhi MD;  Location: [facility] CATH LAB;  Service: Cardiology;  Laterality: Right;    RIGHT HEART CATHETERIZATION Right 10/13/2023    Procedure: INSERTION, CATHETER, RIGHT HEART;  Surgeon: Walter Mcintyre MD;  Location: [facility] CATH LAB;  Service: Cardiology;  Laterality: Right;    RIGHT HEART CATHETERIZATION  11/13/2023    RIGHT HEART CATHETERIZATION Right 11/13/2023    Procedure: INSERTION, CATHETER, RIGHT HEART;  Surgeon: Juventino Bermudez Jr., MD;  Location: [facility] CATH LAB;  Service: Cardiology;  Laterality: Right;    RIGHT HEART CATHETERIZATION Right 11/20/2023    Procedure: INSERTION, CATHETER, RIGHT HEART;  Surgeon: Sajan Hurley MD;  Location: [facility] CATH LAB;  Service: Cardiology;  Laterality: Right;    STERNAL WOUND CLOSURE N/A 12/4/2023    Procedure: CLOSURE, WOUND, STERNUM;  Surgeon: Yuri Washington MD;  [address]  Service: Cardiovascular;  Laterality: N/A;    STERNOTOMY N/A 12/1/2023    Procedure: STERNOTOMY, REDO;  Surgeon: Yuri Washington MD;  [address];  Service: Cardiovascular;  Laterality: N/A;    VALVULOPLASTY, MITRAL VALVE N/A 12/1/2023    Procedure: VALVULOPLASTY, MITRAL VALVE;  Surgeon: Yuri Washington MD;  [facility];  Service: Cardiovascular;  Laterality: N/A;       Review of patient's allergies indicates:  No Known Allergies  Current Facility-Administered Medications   Medication Frequency    0.9%  NaCl infusion Continuous    acetaminophen tablet 650 mg Q6H PRN    albumin human 5% bottle 25 g PRN     albuterol sulfate nebulizer solution 2.5 mg Q4H PRN    amiodarone tablet 400 mg Daily    atorvastatin tablet 40 mg Daily    bisacodyL suppository 10 mg Daily PRN    dextrose 10% bolus 125 mL 125 mL PRN    dextrose 10% bolus 250 mL 250 mL PRN    DOBUtamine 1000 mg in D5W 250 mL infusion Continuous    EPINEPHrine 5 mg in dextrose 5% 250 mL infusion (premix) Continuous    fentaNYL 50 mcg/mL injection 25 mcg Q1H PRN    ferrous gluconate tablet 324 mg Daily with breakfast    furosemide (LASIX) 200 mg in sodium chloride 0.9% SolP 100 mL continuous infusion (conc: 2 mg/mL) Continuous    gabapentin capsule 300 mg Nightly PRN    glucagon (human recombinant) injection 1 mg PRN    glucose chewable tablet 16 g PRN    glucose chewable tablet 24 g PRN    heparin 25,000 units in dextrose 5% 250 mL (100 units/mL) infusion (heparin infusion - NO NOMOGRAM) Continuous    HYDROmorphone injection 0.5 mg Q2H PRN    insulin aspart U-100 pen 0-5 Units PRN    insulin regular in 0.9 % NaCl 100 unit/100 mL (1 unit/mL) infusion Continuous    magnesium hydroxide 400 mg/5 ml suspension 2,400 mg Daily PRN    magnesium sulfate 2g in water 50mL IVPB (premix) PRN    mupirocin 2 % ointment On Call Procedure    mupirocin 2 % ointment BID    niCARdipine 40 mg/200 mL (0.2 mg/mL) infusion Continuous    nitric oxide gas Gas 10 ppm Continuous    ondansetron disintegrating tablet 8 mg Q8H PRN    oxyCODONE immediate release tablet 5 mg Q4H PRN    oxyCODONE immediate release tablet Tab 10 mg Q4H PRN    pantoprazole injection 40 mg BID    polyethylene glycol packet 17 g Daily    potassium chloride 20 mEq in 100 mL IVPB (FOR CENTRAL LINE ADMINISTRATION ONLY) PRN    potassium chloride 20 mEq in 100 mL IVPB (FOR CENTRAL LINE ADMINISTRATION ONLY) PRN    QUEtiapine tablet 50 mg Daily    senna-docusate 8.6-50 mg per tablet 1 tablet BID    senna-docusate 8.6-50 mg per tablet 2 tablet BID PRN    sodium chloride 0.9% flush 10 mL PRN    sodium chloride 0.9% flush 10 mL  Q6H    And    sodium chloride 0.9% flush 10 mL PRN     Family History    None       Tobacco Use    Smoking status: Every Day     Current packs/day: 0.50     Types: Cigarettes    Smokeless tobacco: Not on file   Substance and Sexual Activity    Alcohol use: Yes     Comment: rarely    Drug use: No    Sexual activity: Not on file     Review of Systems   Unable to perform ROS: Mental status change (patient appears encephalopathic on exam)     Objective:     Vital Signs (Most Recent):  Temp: 98.2 °F (36.8 °C) (12/05/23 1200)  Pulse: 97 (12/05/23 1300)  Resp: (!) 32 (12/05/23 1300)  BP: (!) 80/0 (12/05/23 1200)  SpO2: 98 % (12/05/23 1300) Vital Signs (24h Range):  Temp:  [98.2 °F (36.8 °C)-99.9 °F (37.7 °C)] 98.2 °F (36.8 °C)  Pulse:  [] 97  Resp:  [16-38] 32  SpO2:  [90 %-100 %] 98 %  BP: (78-90)/(0) 80/0  Arterial Line BP: ()/(6-80) 89/64     Weight: 91.6 kg (202 lb) (12/05/23 0500)  Body mass index is 27.4 kg/m².  Body surface area is 2.16 meters squared.    I/O last 3 completed shifts:  In: 3794.7 [I.V.:2694.6; Blood:700; NG/GT:100; IV Piggyback:300.1]  Out: 3122 [Urine:2715; Chest Tube:407]     Physical Exam  Vitals and nursing note reviewed.   Constitutional:       General: He is awake. He is not in acute distress.     Appearance: He is ill-appearing. He is not diaphoretic.      Interventions: Nasal cannula in place.   HENT:      Head: Normocephalic and atraumatic.      Right Ear: External ear normal.      Left Ear: External ear normal.      Nose:      Comments: NG tube to left nostril.     Mouth/Throat:      Mouth: Mucous membranes are moist.      Pharynx: Oropharynx is clear. No oropharyngeal exudate or posterior oropharyngeal erythema.   Eyes:      General: No scleral icterus.        Right eye: No discharge.         Left eye: No discharge.      Conjunctiva/sclera: Conjunctivae normal.   Neck:      Comments: Trialysis catheter to left internal jugular vein. [place] Siva catheter in place.  Cardiovascular:       Rate and Rhythm: Normal rate.      Heart sounds: Murmur heard.      Systolic murmur is present.      No friction rub. No gallop.   Pulmonary:      Effort: Tachypnea present.      Breath sounds: Rales present. No wheezing.      Comments: Bibasilar crackles appreciated.   Chest:      Comments: Two chest tubes in place with LVAD present. Chest remains open at this time.  Abdominal:      General: Bowel sounds are normal. There is no distension.      Palpations: Abdomen is soft.   Genitourinary:     Comments: [place] catheter in place.  Musculoskeletal:      Cervical back: Neck supple.      Right lower leg: No edema.      Left lower leg: No edema.   Skin:     General: Skin is warm and dry.      Coloration: Skin is not jaundiced.   Neurological:      Mental Status: He is lethargic and confused.          Significant Labs:  ABGs:   Recent Labs   Lab 12/05/23  1232   PH 7.394   PCO2 25.3*   HCO3 15.5*   POCSATURATED 96   BE -9*     BMP:   Recent Labs   Lab 12/05/23  0745   *      K 4.0      CO2 17*   BUN 72*   CREATININE 3.5*   CALCIUM 8.5*   MG 2.4     CBC:   Recent Labs   Lab 12/05/23  0745 12/05/23  0914 12/05/23  1232   WBC 13.35*  --   --    RBC 2.47*  --   --    HGB 7.3*  --   --    HCT 21.2*   < > 30*     --   --    MCV 86  --   --    MCH 29.6  --   --    MCHC 34.4  --   --     < > = values in this interval not displayed.     CMP:   Recent Labs   Lab 12/05/23  0745   *   CALCIUM 8.5*   ALBUMIN 2.5*   PROT 6.0      K 4.0   CO2 17*      BUN 72*   CREATININE 3.5*   ALKPHOS 90   ALT 24   AST 38   BILITOT 0.9     Coagulation:   Recent Labs   Lab 12/05/23  0406   INR 1.1   APTT 26.4     LFTs:   Recent Labs   Lab 12/05/23  0745   ALT 24   AST 38   ALKPHOS 90   BILITOT 0.9   PROT 6.0   ALBUMIN 2.5*     Microbiology Results (last 7 days)       Procedure Component Value Units Date/Time    Blood culture [[id number]] Collected: 11/28/23 3695    Order Status: Completed Specimen:  Blood from Peripheral, Upper Arm, Right Updated: 12/03/23 1612     Blood Culture, Routine No growth after 5 days.    Blood culture [[id number]] Collected: 11/28/23 1450    Order Status: Completed Specimen: Blood from Peripheral, Forearm, Right Updated: 12/03/23 1612     Blood Culture, Routine No growth after 5 days.          Specimen (24h ago, onward)       Start     Ordered    12/04/23 1530  Specimen to Pathology, Surgery Cardiovascular  Once        Comments: Pre-op Diagnosis: HFrEF (heart failure with reduced ejection fraction) [I50.20]Acute on chronic combined systolic and diastolic CHF, NYHA class 4 [I50.43]Procedure(s):CLOSURE, WOUND, STERNUMINSERTION-RIGHT VENTRICULAR ASSIST DEVICE Number of specimens:2Name of specimens: 1. Two large laps sponges  for gross anatomy for permanent     References:    Click here for ordering Quick Tip   Question Answer Comment   Procedure Type: Cardiovascular    Which provider would you like to cc? TEDDY BERG    Release to patient Immediate        12/04/23 1532                  Urinary sediment on 12/05/2023:                              Significant Imaging:  I have reviewed all imagining in the last 24 hours.

## 2023-12-05 NOTE — PT/OT/SLP PROGRESS
Occupational Therapy      Patient Name:  Radha Abbott   MRN:  [id number]    Patient not seen today secondary to on hold per RN as pt extubated, but requiring increase in O2 as well as agitated, restless in restraints. Will follow-up 12/6.    12/5/2023

## 2023-12-05 NOTE — ASSESSMENT & PLAN NOTE
Neuro/Psych:   -- Sedation: propofol 50mcg/kg/min  -- Pain:    - fentanyl PRN, dilaudid 0.5 q2 PRN while intubated, Oxy PRN  -- SAT daily while intubated             Cardiac:   -- S/P redo sternotomy LVAD with Dr. Washington on 12/01, chest closed 12/4  -- MAP Goal: 70-80  -- Cardene PRN  -- Pressors: epi 0.06,  5   -- levo, vaso, shaji off  -- Anti-HTNs: Cardene   -- Rhythm: NSR  -- amiodarone 400mg daily  -- LVAD heartmate III: RPM 4800, flow 3.6**  -- Beta blocker: Will start when appropriate  -- Statin: Atorvastatin 40 mg QD  -- No ASA at this time      Pulmonary:   -- Goal SpO2 >92%  -- Intubated, minimal vent settings, reassuring SAT  -- Ketty weaned to 5.    -- Chest Tubes x 2 (2 Meds):   - output 255cc/24hr  -- Q1 ABGs and lactates      Renal:  -- Trend BUN/Cr   -- Maintain Hampton, record strict Is/Os  -- lasix gtt 7.5   -- UOP **cc/12hr, net -+1.34L/24 hrs  -- goal to be net neg 1.5-2L/24hr      Recent Labs   Lab 12/04/23  1801 12/05/23  0008 12/05/23  0406   BUN 61* 69* 69*   CREATININE 2.9* 3.1* 3.3*           FEN / GI:   -- Daily CMP, PRN K/Mag/Phos per protocol   -- Replace electrolytes as needed  -- Nutrition: NPO  -- OG tube ok for meds   - Plan for NGT  -- Bowel Regimen: Miralax, docusate      ID:   -- Afebrile  -- WBC stable  -- Abx: cefepime. NO vanc  -- vanc level 7.8    Recent Labs   Lab 12/04/23  1801 12/05/23  0008 12/05/23  0406   WBC 13.87* 14.73* 13.99*           Heme/Onc:   -- Hgb 8.7 pre-operatively  -- received 7 RBC, 2 FFP, 1 platelet, 10 cry intra-op  -- 12/1: 1 RBC, 4 FFP, 2 plts, 1 cryo  -- q4 CBC and coags  -- transfuse 1u pRBC  -- q4 fibrinogen - keep fibrinogen >300  -- holding ASA    Recent Labs   Lab 12/04/23  1801 12/05/23  0008 12/05/23  0406   HGB 8.5* 8.1* 7.4*    188 181   APTT 25.5 26.7 26.4   INR 1.0 1.0 1.1           Endocrine:   -- CTS Goal -140  -- HgbA1c: 7.6  -- Endocrinology consulted for insulin management      PPx:   Feeding: NPO  Analgesia/Sedation: fent,  dilaudid / Prop  Thromboembolic Prevention: SCDs  HOB >30: Yes  Stress Ulcer: pantoprazole   Glucose Control: Yes, insulin management per Endocrinology  Bowel reg:   Invasive Lines/Drains/Airway:   ETT  OGT  Left radial arterial line  RYAN mac w/ vincek + jeanna ELLIOTT quad CVC  Hampton  Chest Tubes: 2 Mediastinal  LVAD     Dispo/Code Status/Palliative:     - Continue SICU Care    - Full Code

## 2023-12-05 NOTE — CONSULTS
Double lumen PICC to Right Basilic vein.  36 cm in length, 31 cm arm circumference and 0 cm exposed.   Lot # [id number].

## 2023-12-05 NOTE — ASSESSMENT & PLAN NOTE
Lab Results   Component Value Date    CREATININE 3.5 (H) 12/05/2023     Avoid insulin stacking  Titrate insulin slowly

## 2023-12-05 NOTE — ASSESSMENT & PLAN NOTE
- consult received for evaluation of skin injury.  - pt presented for sob. S/p LVAD placement 12/1.  - pt at high risk for skin breakdown due to immobility. Chest left open from 12/1-12/4 with pt flat on his back and unable to turn.  - skin assessment today with sacral and buttocks region intact. Sacral foam intact. Dark pigmentation noted to area.  - continue sacral foam. Change q3 days.  - Immerse surface.  - turn q2h.  - wedge for offloading.  - nursing to maintain pressure injury prevention measures and continue wound care per orders.

## 2023-12-06 PROBLEM — R41.82 ALTERED MENTAL STATUS: Status: ACTIVE | Noted: 2023-12-06

## 2023-12-06 LAB
ACID FAST MOD KINY STN SPEC: NORMAL
ALBUMIN SERPL BCP-MCNC: 2.1 G/DL (ref 3.5–5.2)
ALBUMIN SERPL BCP-MCNC: 2.2 G/DL (ref 3.5–5.2)
ALBUMIN SERPL BCP-MCNC: 2.3 G/DL (ref 3.5–5.2)
ALLENS TEST: ABNORMAL
ALLENS TEST: NORMAL
ALP SERPL-CCNC: 115 U/L (ref 55–135)
ALP SERPL-CCNC: 121 U/L (ref 55–135)
ALT SERPL W/O P-5'-P-CCNC: 17 U/L (ref 10–44)
ALT SERPL W/O P-5'-P-CCNC: 17 U/L (ref 10–44)
ANION GAP SERPL CALC-SCNC: 12 MMOL/L (ref 8–16)
ANION GAP SERPL CALC-SCNC: 14 MMOL/L (ref 8–16)
ANION GAP SERPL CALC-SCNC: 15 MMOL/L (ref 8–16)
ANION GAP SERPL CALC-SCNC: 15 MMOL/L (ref 8–16)
ANISOCYTOSIS BLD QL SMEAR: SLIGHT
APTT PPP: 27.2 SEC (ref 21–32)
APTT PPP: 28.5 SEC (ref 21–32)
APTT PPP: 30.1 SEC (ref 21–32)
APTT PPP: 35.5 SEC (ref 21–32)
APTT PPP: 38.1 SEC (ref 21–32)
AST SERPL-CCNC: 47 U/L (ref 10–40)
AST SERPL-CCNC: 49 U/L (ref 10–40)
BASO STIPL BLD QL SMEAR: ABNORMAL
BASOPHILS # BLD AUTO: 0.01 K/UL (ref 0–0.2)
BASOPHILS # BLD AUTO: 0.03 K/UL (ref 0–0.2)
BASOPHILS NFR BLD: 0.1 % (ref 0–1.9)
BASOPHILS NFR BLD: 0.2 % (ref 0–1.9)
BILIRUB DIRECT SERPL-MCNC: 0.4 MG/DL (ref 0.1–0.3)
BILIRUB SERPL-MCNC: 0.9 MG/DL (ref 0.1–1)
BILIRUB SERPL-MCNC: 0.9 MG/DL (ref 0.1–1)
BIPAP: 0
BNP SERPL-MCNC: 1679 PG/ML (ref 0–99)
BUN SERPL-MCNC: 20 MG/DL (ref 8–23)
BUN SERPL-MCNC: 27 MG/DL (ref 8–23)
BUN SERPL-MCNC: 33 MG/DL (ref 8–23)
BUN SERPL-MCNC: 45 MG/DL (ref 8–23)
BURR CELLS BLD QL SMEAR: ABNORMAL
CA-I BLDV-SCNC: 1.09 MMOL/L (ref 1.06–1.42)
CALCIUM SERPL-MCNC: 7.9 MG/DL (ref 8.7–10.5)
CALCIUM SERPL-MCNC: 8.2 MG/DL (ref 8.7–10.5)
CALCIUM SERPL-MCNC: 8.3 MG/DL (ref 8.7–10.5)
CALCIUM SERPL-MCNC: 8.3 MG/DL (ref 8.7–10.5)
CHLORIDE SERPL-SCNC: 106 MMOL/L (ref 95–110)
CHLORIDE SERPL-SCNC: 107 MMOL/L (ref 95–110)
CO2 SERPL-SCNC: 18 MMOL/L (ref 23–29)
CO2 SERPL-SCNC: 19 MMOL/L (ref 23–29)
CO2 SERPL-SCNC: 19 MMOL/L (ref 23–29)
CO2 SERPL-SCNC: 22 MMOL/L (ref 23–29)
CREAT SERPL-MCNC: 1.4 MG/DL (ref 0.5–1.4)
CREAT SERPL-MCNC: 1.6 MG/DL (ref 0.5–1.4)
CREAT SERPL-MCNC: 2 MG/DL (ref 0.5–1.4)
CREAT SERPL-MCNC: 2.3 MG/DL (ref 0.5–1.4)
CRP SERPL-MCNC: 324.8 MG/L (ref 0–8.2)
DELSYS: ABNORMAL
DELSYS: NORMAL
DIFFERENTIAL METHOD BLD: ABNORMAL
DIFFERENTIAL METHOD BLD: ABNORMAL
EOSINOPHIL # BLD AUTO: 0 K/UL (ref 0–0.5)
EOSINOPHIL # BLD AUTO: 0 K/UL (ref 0–0.5)
EOSINOPHIL NFR BLD: 0.1 % (ref 0–8)
EOSINOPHIL NFR BLD: 0.2 % (ref 0–8)
ERYTHROCYTE [DISTWIDTH] IN BLOOD BY AUTOMATED COUNT: 14.9 % (ref 11.5–14.5)
ERYTHROCYTE [DISTWIDTH] IN BLOOD BY AUTOMATED COUNT: 15.4 % (ref 11.5–14.5)
ERYTHROCYTE [SEDIMENTATION RATE] IN BLOOD BY WESTERGREN METHOD: 18 MM/H
EST. GFR  (NO RACE VARIABLE): 31.5 ML/MIN/1.73 M^2
EST. GFR  (NO RACE VARIABLE): 37.3 ML/MIN/1.73 M^2
EST. GFR  (NO RACE VARIABLE): 48.7 ML/MIN/1.73 M^2
EST. GFR  (NO RACE VARIABLE): 57.2 ML/MIN/1.73 M^2
FIBRINOGEN PPP-MCNC: 657 MG/DL (ref 182–400)
FIBRINOGEN PPP-MCNC: 727 MG/DL (ref 182–400)
FIBRINOGEN PPP-MCNC: 727 MG/DL (ref 182–400)
FIBRINOGEN PPP-MCNC: 775 MG/DL (ref 182–400)
FIO2: 100
FIO2: 100
FIO2: 50 %
FIO2: 60
FLOW: 10
GLUCOSE SERPL-MCNC: 104 MG/DL (ref 70–110)
GLUCOSE SERPL-MCNC: 136 MG/DL (ref 70–110)
GLUCOSE SERPL-MCNC: 96 MG/DL (ref 70–110)
GLUCOSE SERPL-MCNC: 98 MG/DL (ref 70–110)
HCO3 UR-SCNC: 16.4 MMOL/L (ref 24–28)
HCO3 UR-SCNC: 17.8 MMOL/L (ref 24–28)
HCO3 UR-SCNC: 18 MMOL/L (ref 24–28)
HCO3 UR-SCNC: 20.7 MMOL/L (ref 24–28)
HCO3 UR-SCNC: 21.7 MMOL/L (ref 24–28)
HCO3 UR-SCNC: 22.7 MMOL/L (ref 24–28)
HCT VFR BLD AUTO: 15.4 % (ref 40–54)
HCT VFR BLD AUTO: 21 % (ref 40–54)
HCT VFR BLD CALC: 19 %PCV (ref 36–54)
HCT VFR BLD CALC: 23 %PCV (ref 36–54)
HGB BLD-MCNC: 5.2 G/DL (ref 14–18)
HGB BLD-MCNC: 7.1 G/DL (ref 14–18)
HYPOCHROMIA BLD QL SMEAR: ABNORMAL
IMM GRANULOCYTES # BLD AUTO: 0.11 K/UL (ref 0–0.04)
IMM GRANULOCYTES # BLD AUTO: 0.12 K/UL (ref 0–0.04)
IMM GRANULOCYTES NFR BLD AUTO: 0.9 % (ref 0–0.5)
IMM GRANULOCYTES NFR BLD AUTO: 1 % (ref 0–0.5)
INR PPP: 1.1 (ref 0.8–1.2)
LDH SERPL L TO P-CCNC: 0.53 MMOL/L (ref 0.36–1.25)
LDH SERPL L TO P-CCNC: 0.55 MMOL/L (ref 0.36–1.25)
LDH SERPL L TO P-CCNC: 1.13 MMOL/L (ref 0.36–1.25)
LDH SERPL L TO P-CCNC: 584 U/L (ref 110–260)
LYMPHOCYTES # BLD AUTO: 0.5 K/UL (ref 1–4.8)
LYMPHOCYTES # BLD AUTO: 0.5 K/UL (ref 1–4.8)
LYMPHOCYTES NFR BLD: 4.2 % (ref 18–48)
LYMPHOCYTES NFR BLD: 4.4 % (ref 18–48)
MAGNESIUM SERPL-MCNC: 2 MG/DL (ref 1.6–2.6)
MAGNESIUM SERPL-MCNC: 2 MG/DL (ref 1.6–2.6)
MAGNESIUM SERPL-MCNC: 2.1 MG/DL (ref 1.6–2.6)
MAGNESIUM SERPL-MCNC: 2.3 MG/DL (ref 1.6–2.6)
MCH RBC QN AUTO: 28.6 PG (ref 27–31)
MCH RBC QN AUTO: 29.3 PG (ref 27–31)
MCHC RBC AUTO-ENTMCNC: 33.8 G/DL (ref 32–36)
MCHC RBC AUTO-ENTMCNC: 33.8 G/DL (ref 32–36)
MCV RBC AUTO: 85 FL (ref 82–98)
MCV RBC AUTO: 87 FL (ref 82–98)
METHEMOGLOBIN: 1.3 % (ref 0–3)
MODE: ABNORMAL
MODE: NORMAL
MONOCYTES # BLD AUTO: 0.8 K/UL (ref 0.3–1)
MONOCYTES # BLD AUTO: 0.8 K/UL (ref 0.3–1)
MONOCYTES NFR BLD: 6.7 % (ref 4–15)
MONOCYTES NFR BLD: 6.9 % (ref 4–15)
NEUTROPHILS # BLD AUTO: 10.7 K/UL (ref 1.8–7.7)
NEUTROPHILS # BLD AUTO: 10.8 K/UL (ref 1.8–7.7)
NEUTROPHILS NFR BLD: 87.6 % (ref 38–73)
NEUTROPHILS NFR BLD: 87.7 % (ref 38–73)
NRBC BLD-RTO: 0 /100 WBC
NRBC BLD-RTO: 1 /100 WBC
OVALOCYTES BLD QL SMEAR: ABNORMAL
PCO2 BLDA: 29.2 MMHG (ref 35–45)
PCO2 BLDA: 33.2 MMHG (ref 35–45)
PCO2 BLDA: 34.3 MMHG (ref 35–45)
PCO2 BLDA: 34.4 MMHG (ref 35–45)
PCO2 BLDA: 34.5 MMHG (ref 35–45)
PCO2 BLDA: 42.1 MMHG (ref 35–45)
PEEP: 5
PH SMN: 7.3 [PH] (ref 7.35–7.45)
PH SMN: 7.32 [PH] (ref 7.35–7.45)
PH SMN: 7.33 [PH] (ref 7.35–7.45)
PH SMN: 7.36 [PH] (ref 7.35–7.45)
PH SMN: 7.41 [PH] (ref 7.35–7.45)
PH SMN: 7.44 [PH] (ref 7.35–7.45)
PHOSPHATE SERPL-MCNC: 2.2 MG/DL (ref 2.7–4.5)
PHOSPHATE SERPL-MCNC: 2.8 MG/DL (ref 2.7–4.5)
PHOSPHATE SERPL-MCNC: 3.8 MG/DL (ref 2.7–4.5)
PHOSPHATE SERPL-MCNC: 4.4 MG/DL (ref 2.7–4.5)
PLATELET # BLD AUTO: 209 K/UL (ref 150–450)
PLATELET # BLD AUTO: 236 K/UL (ref 150–450)
PLATELET BLD QL SMEAR: ABNORMAL
PMV BLD AUTO: 10.4 FL (ref 9.2–12.9)
PMV BLD AUTO: 10.6 FL (ref 9.2–12.9)
PO2 BLDA: 19 MMHG (ref 40–60)
PO2 BLDA: 218 MMHG (ref 80–100)
PO2 BLDA: 25 MMHG (ref 40–60)
PO2 BLDA: 38 MMHG (ref 40–60)
PO2 BLDA: 437 MMHG (ref 80–100)
PO2 BLDA: 55 MMHG (ref 80–100)
POC BE: -1 MMOL/L
POC BE: -3 MMOL/L
POC BE: -6 MMOL/L
POC BE: -8 MMOL/L
POC BE: -8 MMOL/L
POC BE: -9 MMOL/L
POC IONIZED CALCIUM: 1.08 MMOL/L (ref 1.06–1.42)
POC IONIZED CALCIUM: 1.13 MMOL/L (ref 1.06–1.42)
POC METHB: 1.3 % (ref 0–3)
POC PERFORMED BY: NORMAL
POC SATURATED O2: 100 % (ref 95–100)
POC SATURATED O2: 100 % (ref 95–100)
POC SATURATED O2: 33 % (ref 95–100)
POC SATURATED O2: 47 % (ref 95–100)
POC SATURATED O2: 68 % (ref 95–100)
POC SATURATED O2: 85 % (ref 95–100)
POC TCO2: 17 MMOL/L (ref 23–27)
POC TCO2: 19 MMOL/L (ref 23–27)
POC TCO2: 19 MMOL/L (ref 24–29)
POC TCO2: 22 MMOL/L (ref 23–27)
POC TCO2: 23 MMOL/L (ref 24–29)
POC TCO2: 24 MMOL/L (ref 24–29)
POC TEMPERATURE: 37 C
POCT GLUCOSE: 102 MG/DL (ref 70–110)
POCT GLUCOSE: 110 MG/DL (ref 70–110)
POCT GLUCOSE: 114 MG/DL (ref 70–110)
POCT GLUCOSE: 120 MG/DL (ref 70–110)
POCT GLUCOSE: 133 MG/DL (ref 70–110)
POCT GLUCOSE: 134 MG/DL (ref 70–110)
POCT GLUCOSE: 155 MG/DL (ref 70–110)
POCT GLUCOSE: 228 MG/DL (ref 70–110)
POCT GLUCOSE: 248 MG/DL (ref 70–110)
POIKILOCYTOSIS BLD QL SMEAR: SLIGHT
POLYCHROMASIA BLD QL SMEAR: ABNORMAL
POTASSIUM BLD-SCNC: 4.5 MMOL/L (ref 3.5–5.1)
POTASSIUM BLD-SCNC: 4.8 MMOL/L (ref 3.5–5.1)
POTASSIUM SERPL-SCNC: 4.1 MMOL/L (ref 3.5–5.1)
POTASSIUM SERPL-SCNC: 4.2 MMOL/L (ref 3.5–5.1)
POTASSIUM SERPL-SCNC: 4.2 MMOL/L (ref 3.5–5.1)
POTASSIUM SERPL-SCNC: 4.5 MMOL/L (ref 3.5–5.1)
POTASSIUM SERPL-SCNC: 5 MMOL/L (ref 3.5–5.1)
POTASSIUM SERPL-SCNC: 5 MMOL/L (ref 3.5–5.1)
PROT SERPL-MCNC: 5.8 G/DL (ref 6–8.4)
PROT SERPL-MCNC: 5.9 G/DL (ref 6–8.4)
PROTHROMBIN TIME: 11.4 SEC (ref 9–12.5)
PROTHROMBIN TIME: 11.5 SEC (ref 9–12.5)
PROTHROMBIN TIME: 11.8 SEC (ref 9–12.5)
PROTHROMBIN TIME: 12.2 SEC (ref 9–12.5)
RBC # BLD AUTO: 1.82 M/UL (ref 4.6–6.2)
RBC # BLD AUTO: 2.42 M/UL (ref 4.6–6.2)
SAMPLE: ABNORMAL
SAMPLE: NORMAL
SITE: ABNORMAL
SITE: NORMAL
SODIUM BLD-SCNC: 136 MMOL/L (ref 136–145)
SODIUM BLD-SCNC: 136 MMOL/L (ref 136–145)
SODIUM SERPL-SCNC: 139 MMOL/L (ref 136–145)
SODIUM SERPL-SCNC: 140 MMOL/L (ref 136–145)
SP02: 99
SPECIMEN SOURCE: NORMAL
SPHEROCYTES BLD QL SMEAR: ABNORMAL
VT: 450
WBC # BLD AUTO: 12.24 K/UL (ref 3.9–12.7)
WBC # BLD AUTO: 12.35 K/UL (ref 3.9–12.7)

## 2023-12-06 PROCEDURE — 27000513 HC SENSOR FLOTRAC

## 2023-12-06 PROCEDURE — 25000003 PHARM REV CODE 250: Performed by: ANESTHESIOLOGY

## 2023-12-06 PROCEDURE — 20000000 HC ICU ROOM

## 2023-12-06 PROCEDURE — 88305 TISSUE EXAM BY PATHOLOGIST: CPT | Mod: 26,,, | Performed by: PATHOLOGY

## 2023-12-06 PROCEDURE — 83735 ASSAY OF MAGNESIUM: CPT | Performed by: STUDENT IN AN ORGANIZED HEALTH CARE EDUCATION/TRAINING PROGRAM

## 2023-12-06 PROCEDURE — 83880 ASSAY OF NATRIURETIC PEPTIDE: CPT | Performed by: STUDENT IN AN ORGANIZED HEALTH CARE EDUCATION/TRAINING PROGRAM

## 2023-12-06 PROCEDURE — 88112 CYTOPATH CELL ENHANCE TECH: CPT | Mod: 26,,, | Performed by: PATHOLOGY

## 2023-12-06 PROCEDURE — 25000003 PHARM REV CODE 250

## 2023-12-06 PROCEDURE — 87206 SMEAR FLUORESCENT/ACID STAI: CPT | Performed by: STUDENT IN AN ORGANIZED HEALTH CARE EDUCATION/TRAINING PROGRAM

## 2023-12-06 PROCEDURE — 99900026 HC AIRWAY MAINTENANCE (STAT)

## 2023-12-06 PROCEDURE — 87205 SMEAR GRAM STAIN: CPT | Performed by: STUDENT IN AN ORGANIZED HEALTH CARE EDUCATION/TRAINING PROGRAM

## 2023-12-06 PROCEDURE — 85730 THROMBOPLASTIN TIME PARTIAL: CPT | Performed by: STUDENT IN AN ORGANIZED HEALTH CARE EDUCATION/TRAINING PROGRAM

## 2023-12-06 PROCEDURE — 80076 HEPATIC FUNCTION PANEL: CPT | Performed by: STUDENT IN AN ORGANIZED HEALTH CARE EDUCATION/TRAINING PROGRAM

## 2023-12-06 PROCEDURE — 80053 COMPREHEN METABOLIC PANEL: CPT | Performed by: STUDENT IN AN ORGANIZED HEALTH CARE EDUCATION/TRAINING PROGRAM

## 2023-12-06 PROCEDURE — 31622 DX BRONCHOSCOPE/WASH: CPT

## 2023-12-06 PROCEDURE — 90945 DIALYSIS ONE EVALUATION: CPT

## 2023-12-06 PROCEDURE — 84132 ASSAY OF SERUM POTASSIUM: CPT | Performed by: THORACIC SURGERY (CARDIOTHORACIC VASCULAR SURGERY)

## 2023-12-06 PROCEDURE — 63600367 HC NITRIC OXIDE PER HOUR

## 2023-12-06 PROCEDURE — 94761 N-INVAS EAR/PLS OXIMETRY MLT: CPT | Mod: XB

## 2023-12-06 PROCEDURE — 36620 INSERTION CATHETER ARTERY: CPT

## 2023-12-06 PROCEDURE — 83735 ASSAY OF MAGNESIUM: CPT | Mod: 91 | Performed by: STUDENT IN AN ORGANIZED HEALTH CARE EDUCATION/TRAINING PROGRAM

## 2023-12-06 PROCEDURE — 87015 SPECIMEN INFECT AGNT CONCNTJ: CPT | Performed by: STUDENT IN AN ORGANIZED HEALTH CARE EDUCATION/TRAINING PROGRAM

## 2023-12-06 PROCEDURE — 87102 FUNGUS ISOLATION CULTURE: CPT | Performed by: STUDENT IN AN ORGANIZED HEALTH CARE EDUCATION/TRAINING PROGRAM

## 2023-12-06 PROCEDURE — 83605 ASSAY OF LACTIC ACID: CPT

## 2023-12-06 PROCEDURE — 82803 BLOOD GASES ANY COMBINATION: CPT

## 2023-12-06 PROCEDURE — 83615 LACTATE (LD) (LDH) ENZYME: CPT | Performed by: STUDENT IN AN ORGANIZED HEALTH CARE EDUCATION/TRAINING PROGRAM

## 2023-12-06 PROCEDURE — 84132 ASSAY OF SERUM POTASSIUM: CPT

## 2023-12-06 PROCEDURE — 25000003 PHARM REV CODE 250: Performed by: STUDENT IN AN ORGANIZED HEALTH CARE EDUCATION/TRAINING PROGRAM

## 2023-12-06 PROCEDURE — 99291 CRITICAL CARE FIRST HOUR: CPT | Mod: 25,,, | Performed by: ANESTHESIOLOGY

## 2023-12-06 PROCEDURE — 27000221 HC OXYGEN, UP TO 24 HOURS

## 2023-12-06 PROCEDURE — 85014 HEMATOCRIT: CPT

## 2023-12-06 PROCEDURE — 63600175 PHARM REV CODE 636 W HCPCS

## 2023-12-06 PROCEDURE — 99291 CRITICAL CARE FIRST HOUR: CPT | Mod: ,,, | Performed by: REGISTERED NURSE

## 2023-12-06 PROCEDURE — 93750 INTERROGATION VAD IN PERSON: CPT | Mod: ,,, | Performed by: INTERNAL MEDICINE

## 2023-12-06 PROCEDURE — 88312 SPECIAL STAINS GROUP 1: CPT | Performed by: PATHOLOGY

## 2023-12-06 PROCEDURE — 37799 UNLISTED PX VASCULAR SURGERY: CPT

## 2023-12-06 PROCEDURE — 36620 INSERTION CATHETER ARTERY: CPT | Mod: ,,, | Performed by: ANESTHESIOLOGY

## 2023-12-06 PROCEDURE — 27000248 HC VAD-ADDITIONAL DAY

## 2023-12-06 PROCEDURE — 87070 CULTURE OTHR SPECIMN AEROBIC: CPT | Performed by: STUDENT IN AN ORGANIZED HEALTH CARE EDUCATION/TRAINING PROGRAM

## 2023-12-06 PROCEDURE — 27100171 HC OXYGEN HIGH FLOW UP TO 24 HOURS

## 2023-12-06 PROCEDURE — 63600175 PHARM REV CODE 636 W HCPCS: Performed by: STUDENT IN AN ORGANIZED HEALTH CARE EDUCATION/TRAINING PROGRAM

## 2023-12-06 PROCEDURE — 88305 TISSUE EXAM BY PATHOLOGIST: CPT | Performed by: PATHOLOGY

## 2023-12-06 PROCEDURE — 88112 CYTOPATH CELL ENHANCE TECH: CPT | Performed by: PATHOLOGY

## 2023-12-06 PROCEDURE — 85025 COMPLETE CBC W/AUTO DIFF WBC: CPT | Performed by: THORACIC SURGERY (CARDIOTHORACIC VASCULAR SURGERY)

## 2023-12-06 PROCEDURE — 31720 CLEARANCE OF AIRWAYS: CPT

## 2023-12-06 PROCEDURE — 80069 RENAL FUNCTION PANEL: CPT | Mod: 91 | Performed by: STUDENT IN AN ORGANIZED HEALTH CARE EDUCATION/TRAINING PROGRAM

## 2023-12-06 PROCEDURE — 87116 MYCOBACTERIA CULTURE: CPT | Performed by: STUDENT IN AN ORGANIZED HEALTH CARE EDUCATION/TRAINING PROGRAM

## 2023-12-06 PROCEDURE — 94002 VENT MGMT INPAT INIT DAY: CPT

## 2023-12-06 PROCEDURE — A4216 STERILE WATER/SALINE, 10 ML: HCPCS | Performed by: THORACIC SURGERY (CARDIOTHORACIC VASCULAR SURGERY)

## 2023-12-06 PROCEDURE — 87206 SMEAR FLUORESCENT/ACID STAI: CPT | Mod: 91 | Performed by: STUDENT IN AN ORGANIZED HEALTH CARE EDUCATION/TRAINING PROGRAM

## 2023-12-06 PROCEDURE — 85730 THROMBOPLASTIN TIME PARTIAL: CPT | Mod: 91 | Performed by: THORACIC SURGERY (CARDIOTHORACIC VASCULAR SURGERY)

## 2023-12-06 PROCEDURE — 83050 HGB METHEMOGLOBIN QUAN: CPT

## 2023-12-06 PROCEDURE — 84295 ASSAY OF SERUM SODIUM: CPT

## 2023-12-06 PROCEDURE — 85384 FIBRINOGEN ACTIVITY: CPT | Mod: 91 | Performed by: THORACIC SURGERY (CARDIOTHORACIC VASCULAR SURGERY)

## 2023-12-06 PROCEDURE — 82330 ASSAY OF CALCIUM: CPT | Performed by: STUDENT IN AN ORGANIZED HEALTH CARE EDUCATION/TRAINING PROGRAM

## 2023-12-06 PROCEDURE — 25000003 PHARM REV CODE 250: Performed by: THORACIC SURGERY (CARDIOTHORACIC VASCULAR SURGERY)

## 2023-12-06 PROCEDURE — C9113 INJ PANTOPRAZOLE SODIUM, VIA: HCPCS

## 2023-12-06 PROCEDURE — 27202415 HC CARTRIDGE, CRRT

## 2023-12-06 PROCEDURE — 63600175 PHARM REV CODE 636 W HCPCS: Performed by: THORACIC SURGERY (CARDIOTHORACIC VASCULAR SURGERY)

## 2023-12-06 PROCEDURE — 82330 ASSAY OF CALCIUM: CPT

## 2023-12-06 PROCEDURE — 99900025 HC BRONCHOSCOPY-ASST (STAT)

## 2023-12-06 PROCEDURE — 85610 PROTHROMBIN TIME: CPT | Performed by: STUDENT IN AN ORGANIZED HEALTH CARE EDUCATION/TRAINING PROGRAM

## 2023-12-06 PROCEDURE — 83735 ASSAY OF MAGNESIUM: CPT | Mod: 91 | Performed by: THORACIC SURGERY (CARDIOTHORACIC VASCULAR SURGERY)

## 2023-12-06 PROCEDURE — 84100 ASSAY OF PHOSPHORUS: CPT | Performed by: STUDENT IN AN ORGANIZED HEALTH CARE EDUCATION/TRAINING PROGRAM

## 2023-12-06 PROCEDURE — 99232 SBSQ HOSP IP/OBS MODERATE 35: CPT | Mod: ,,,

## 2023-12-06 PROCEDURE — 99900035 HC TECH TIME PER 15 MIN (STAT)

## 2023-12-06 PROCEDURE — 99232 SBSQ HOSP IP/OBS MODERATE 35: CPT | Mod: ,,, | Performed by: INTERNAL MEDICINE

## 2023-12-06 PROCEDURE — 63600175 PHARM REV CODE 636 W HCPCS: Performed by: ANESTHESIOLOGY

## 2023-12-06 PROCEDURE — 86140 C-REACTIVE PROTEIN: CPT | Performed by: STUDENT IN AN ORGANIZED HEALTH CARE EDUCATION/TRAINING PROGRAM

## 2023-12-06 PROCEDURE — 88312 SPECIAL STAINS GROUP 1: CPT | Mod: 26,,, | Performed by: PATHOLOGY

## 2023-12-06 PROCEDURE — 31500 INSERT EMERGENCY AIRWAY: CPT | Mod: ,,, | Performed by: ANESTHESIOLOGY

## 2023-12-06 PROCEDURE — 0B9J8ZX DRAINAGE OF LEFT LOWER LUNG LOBE, VIA NATURAL OR ARTIFICIAL OPENING ENDOSCOPIC, DIAGNOSTIC: ICD-10-PCS | Performed by: INTERNAL MEDICINE

## 2023-12-06 PROCEDURE — 85610 PROTHROMBIN TIME: CPT | Mod: 91 | Performed by: THORACIC SURGERY (CARDIOTHORACIC VASCULAR SURGERY)

## 2023-12-06 RX ORDER — SUCCINYLCHOLINE CHLORIDE 20 MG/ML
INJECTION INTRAMUSCULAR; INTRAVENOUS
Status: DISCONTINUED
Start: 2023-12-06 | End: 2023-12-06 | Stop reason: WASHOUT

## 2023-12-06 RX ORDER — NOREPINEPHRINE BITARTRATE/D5W 4MG/250ML
0-3 PLASTIC BAG, INJECTION (ML) INTRAVENOUS CONTINUOUS
Status: DISCONTINUED | OUTPATIENT
Start: 2023-12-06 | End: 2023-12-07

## 2023-12-06 RX ORDER — HYDROCODONE BITARTRATE AND ACETAMINOPHEN 500; 5 MG/1; MG/1
TABLET ORAL CONTINUOUS
Status: DISCONTINUED | OUTPATIENT
Start: 2023-12-06 | End: 2023-12-06

## 2023-12-06 RX ORDER — HYDROMORPHONE HYDROCHLORIDE 1 MG/ML
0.2 INJECTION, SOLUTION INTRAMUSCULAR; INTRAVENOUS; SUBCUTANEOUS EVERY 4 HOURS PRN
Status: CANCELLED | OUTPATIENT
Start: 2023-12-06

## 2023-12-06 RX ORDER — ETOMIDATE 2 MG/ML
INJECTION INTRAVENOUS
Status: DISCONTINUED
Start: 2023-12-06 | End: 2023-12-06 | Stop reason: WASHOUT

## 2023-12-06 RX ORDER — MIDAZOLAM HYDROCHLORIDE 1 MG/ML
INJECTION INTRAMUSCULAR; INTRAVENOUS
Status: COMPLETED
Start: 2023-12-06 | End: 2023-12-06

## 2023-12-06 RX ORDER — MIDODRINE HYDROCHLORIDE 5 MG/1
5 TABLET ORAL 2 TIMES DAILY
Status: DISCONTINUED | OUTPATIENT
Start: 2023-12-07 | End: 2023-12-08

## 2023-12-06 RX ORDER — HEPARIN SODIUM 10000 [USP'U]/100ML
1000 INJECTION, SOLUTION INTRAVENOUS CONTINUOUS
Status: DISCONTINUED | OUTPATIENT
Start: 2023-12-06 | End: 2023-12-12

## 2023-12-06 RX ORDER — AMOXICILLIN 250 MG
2 CAPSULE ORAL 2 TIMES DAILY PRN
Status: DISCONTINUED | OUTPATIENT
Start: 2023-12-06 | End: 2023-12-07

## 2023-12-06 RX ORDER — WARFARIN 2 MG/1
2 TABLET ORAL DAILY
Status: DISCONTINUED | OUTPATIENT
Start: 2023-12-06 | End: 2023-12-12

## 2023-12-06 RX ORDER — MIDODRINE HYDROCHLORIDE 5 MG/1
10 TABLET ORAL EVERY 8 HOURS
Status: DISCONTINUED | OUTPATIENT
Start: 2023-12-06 | End: 2023-12-06

## 2023-12-06 RX ORDER — ROCURONIUM BROMIDE 10 MG/ML
INJECTION, SOLUTION INTRAVENOUS
Status: COMPLETED
Start: 2023-12-06 | End: 2023-12-06

## 2023-12-06 RX ORDER — PROPOFOL 10 MG/ML
0-50 INJECTION, EMULSION INTRAVENOUS CONTINUOUS
Status: DISCONTINUED | OUTPATIENT
Start: 2023-12-06 | End: 2023-12-07

## 2023-12-06 RX ORDER — ACETAMINOPHEN 325 MG/1
650 TABLET ORAL EVERY 6 HOURS
Status: CANCELLED | OUTPATIENT
Start: 2023-12-06

## 2023-12-06 RX ORDER — MAGNESIUM SULFATE HEPTAHYDRATE 40 MG/ML
2 INJECTION, SOLUTION INTRAVENOUS
Status: DISCONTINUED | OUTPATIENT
Start: 2023-12-06 | End: 2023-12-06

## 2023-12-06 RX ORDER — INSULIN ASPART 100 [IU]/ML
3 INJECTION, SOLUTION INTRAVENOUS; SUBCUTANEOUS
Status: DISCONTINUED | OUTPATIENT
Start: 2023-12-06 | End: 2023-12-06

## 2023-12-06 RX ORDER — DEXTROSE MONOHYDRATE 100 MG/ML
INJECTION, SOLUTION INTRAVENOUS CONTINUOUS PRN
Status: DISCONTINUED | OUTPATIENT
Start: 2023-12-06 | End: 2023-12-06

## 2023-12-06 RX ORDER — QUETIAPINE FUMARATE 25 MG/1
50 TABLET, FILM COATED ORAL EVERY 6 HOURS PRN
Status: DISCONTINUED | OUTPATIENT
Start: 2023-12-06 | End: 2023-12-22

## 2023-12-06 RX ORDER — GLUCAGON 1 MG
1 KIT INJECTION
Status: DISCONTINUED | OUTPATIENT
Start: 2023-12-06 | End: 2023-12-06

## 2023-12-06 RX ORDER — MIDAZOLAM HYDROCHLORIDE 1 MG/ML
INJECTION INTRAMUSCULAR; INTRAVENOUS
Status: DISCONTINUED
Start: 2023-12-06 | End: 2023-12-06 | Stop reason: WASHOUT

## 2023-12-06 RX ORDER — PROPOFOL 10 MG/ML
VIAL (ML) INTRAVENOUS
Status: COMPLETED
Start: 2023-12-06 | End: 2023-12-06

## 2023-12-06 RX ORDER — OXYCODONE HYDROCHLORIDE 5 MG/1
5 TABLET ORAL EVERY 4 HOURS PRN
Status: CANCELLED | OUTPATIENT
Start: 2023-12-06

## 2023-12-06 RX ORDER — INSULIN ASPART 100 [IU]/ML
0-5 INJECTION, SOLUTION INTRAVENOUS; SUBCUTANEOUS EVERY 4 HOURS PRN
Status: DISCONTINUED | OUTPATIENT
Start: 2023-12-06 | End: 2023-12-06

## 2023-12-06 RX ORDER — PHENYLEPHRINE HCL IN 0.9% NACL 1 MG/10 ML
SYRINGE (ML) INTRAVENOUS
Status: DISCONTINUED
Start: 2023-12-06 | End: 2023-12-06 | Stop reason: WASHOUT

## 2023-12-06 RX ORDER — OXYCODONE HYDROCHLORIDE 5 MG/1
2.5 TABLET ORAL EVERY 6 HOURS PRN
Status: CANCELLED | OUTPATIENT
Start: 2023-12-06

## 2023-12-06 RX ADMIN — ROCURONIUM BROMIDE 100 MG: 10 INJECTION, SOLUTION INTRAVENOUS at 12:12

## 2023-12-06 RX ADMIN — SENNOSIDES AND DOCUSATE SODIUM 1 TABLET: 8.6; 5 TABLET ORAL at 09:12

## 2023-12-06 RX ADMIN — MIDODRINE HYDROCHLORIDE 10 MG: 5 TABLET ORAL at 11:12

## 2023-12-06 RX ADMIN — AMIODARONE HYDROCHLORIDE 400 MG: 200 TABLET ORAL at 08:12

## 2023-12-06 RX ADMIN — VASOPRESSIN 0.04 UNITS/MIN: 20 INJECTION INTRAVENOUS at 11:12

## 2023-12-06 RX ADMIN — PROPOFOL 10 MCG/KG/MIN: 10 INJECTION, EMULSION INTRAVENOUS at 12:12

## 2023-12-06 RX ADMIN — Medication 10 ML: at 06:12

## 2023-12-06 RX ADMIN — Medication 324 MG: at 08:12

## 2023-12-06 RX ADMIN — ATORVASTATIN CALCIUM 40 MG: 10 TABLET, FILM COATED ORAL at 08:12

## 2023-12-06 RX ADMIN — DOBUTAMINE IN DEXTROSE 5 MCG/KG/MIN: 400 INJECTION, SOLUTION INTRAVENOUS at 08:12

## 2023-12-06 RX ADMIN — PANTOPRAZOLE SODIUM 40 MG: 40 INJECTION, POWDER, FOR SOLUTION INTRAVENOUS at 08:12

## 2023-12-06 RX ADMIN — EPINEPHRINE 0.04 MCG/KG/MIN: 1 INJECTION INTRAMUSCULAR; INTRAVENOUS; SUBCUTANEOUS at 11:12

## 2023-12-06 RX ADMIN — EPINEPHRINE 0.05 MCG/KG/MIN: 1 INJECTION INTRAMUSCULAR; INTRAVENOUS; SUBCUTANEOUS at 01:12

## 2023-12-06 RX ADMIN — Medication 10 ML: at 12:12

## 2023-12-06 RX ADMIN — EPINEPHRINE 0.06 MCG/KG/MIN: 1 INJECTION INTRAMUSCULAR; INTRAVENOUS; SUBCUTANEOUS at 07:12

## 2023-12-06 RX ADMIN — MUPIROCIN: 20 OINTMENT TOPICAL at 08:12

## 2023-12-06 RX ADMIN — WARFARIN SODIUM 2 MG: 2 TABLET ORAL at 06:12

## 2023-12-06 RX ADMIN — OXYCODONE HYDROCHLORIDE 5 MG: 5 TABLET ORAL at 01:12

## 2023-12-06 RX ADMIN — PANTOPRAZOLE SODIUM 40 MG: 40 INJECTION, POWDER, FOR SOLUTION INTRAVENOUS at 09:12

## 2023-12-06 RX ADMIN — MIDAZOLAM HYDROCHLORIDE 4 MG: 2 INJECTION, SOLUTION INTRAMUSCULAR; INTRAVENOUS at 12:12

## 2023-12-06 RX ADMIN — SENNOSIDES AND DOCUSATE SODIUM 1 TABLET: 8.6; 5 TABLET ORAL at 08:12

## 2023-12-06 RX ADMIN — SODIUM CHLORIDE: 9 INJECTION, SOLUTION INTRAVENOUS at 02:12

## 2023-12-06 RX ADMIN — Medication 10 ML: at 05:12

## 2023-12-06 RX ADMIN — POLYETHYLENE GLYCOL 3350 17 G: 17 POWDER, FOR SOLUTION ORAL at 08:12

## 2023-12-06 RX ADMIN — PROPOFOL 30 MCG/KG/MIN: 10 INJECTION, EMULSION INTRAVENOUS at 05:12

## 2023-12-06 NOTE — PROGRESS NOTES
[facility]  Nephrology  Progress Note    Patient Name: Radha Abbott  MRN: [id number]  Admission Date: 11/9/2023  Hospital Length of Stay: 27 days  Attending Provider: Yuri Washington MD   Primary Care Physician: Vasu Kong MD  Principal Problem:Acute on chronic combined systolic and diastolic CHF, NYHA class 4    Subjective:     HPI: Mr. Abbott is a 63-year-old man with ischemic cardiomyopathy with most recent TTE showing EF 10 -15% and G3DD s/p Medtronik ICM placement on chronic dobutamine infusion, CAD s/p x3 vessel CABG back in 2009, type 2 diabetes mellitus (most recent hemoglobin A1c 7.6%), hypertension, HLD, history of ventricular fibrillation for which he is on amiodarone and CKD IIIb (baseline creatinine ~2-2.2) who was initially admitted on 11/9 with heart failure exacerbation and hypervolemia for which he was managed with inotrope with dobutamine in addition to IV diuresis. He ultimately underwent IABP placed on 11/21 for mechanical hemodynamic support and subsequently underwent LVAD placement on 12/1. His renal function appears to be mostly stable with IVÁN and creatinine in mid 2s to 3s in addition he is still making urine with Lasix infusion however on 12/5 Nephrology consulted given concern for uremia with BUN 72.     Interval History: Patient seen and examined this AM. Wife at bedside. Tolerating SLED/SCUFF overnight following Cathflow to trialysis catheter. Afebrile with pulse ranging from 100=90s bpm. Systolic blood pressures ranging from 90-70s mmHg via arterial line on epinephrine 0.06 (in addition to dobutamine at 5 for ionotropic support). He is saturating +92% on 10 LPM and HFNC with documented UOP of 283 mL in the last 24 hours. He is net negative ~77 mL. He remains altered at this time despite BUN of 33.    Review of patient's allergies indicates:  No Known Allergies  Current Facility-Administered Medications   Medication Frequency    0.9%  NaCl infusion (CRRT USE  ONLY) Continuous    0.9%  NaCl infusion Continuous    acetaminophen tablet 650 mg Q6H PRN    albumin human 5% bottle 25 g PRN    albuterol sulfate nebulizer solution 2.5 mg Q4H PRN    amiodarone tablet 400 mg Daily    atorvastatin tablet 40 mg Daily    bisacodyL suppository 10 mg Daily PRN    dextrose 10% bolus 125 mL 125 mL PRN    dextrose 10% bolus 250 mL 250 mL PRN    DOBUtamine 1000 mg in D5W 250 mL infusion Continuous    EPINEPHrine 5 mg in dextrose 5% 250 mL infusion (premix) Continuous    fentaNYL 50 mcg/mL injection 25 mcg Q1H PRN    ferrous gluconate tablet 324 mg Daily with breakfast    furosemide (LASIX) 200 mg in sodium chloride 0.9% SolP 100 mL continuous infusion (conc: 2 mg/mL) Continuous    gabapentin capsule 300 mg Nightly PRN    glucagon (human recombinant) injection 1 mg PRN    glucose chewable tablet 16 g PRN    glucose chewable tablet 24 g PRN    heparin 25,000 units in dextrose 5% 250 mL (100 units/mL) infusion (heparin infusion - NO NOMOGRAM) Continuous    HYDROmorphone injection 0.5 mg Q2H PRN    insulin aspart U-100 pen 0-5 Units PRN    insulin regular in 0.9 % NaCl 100 unit/100 mL (1 unit/mL) infusion Continuous    magnesium hydroxide 400 mg/5 ml suspension 2,400 mg Daily PRN    magnesium sulfate 2g in water 50mL IVPB (premix) PRN    mupirocin 2 % ointment On Call Procedure    mupirocin 2 % ointment BID    niCARdipine 40 mg/200 mL (0.2 mg/mL) infusion Continuous    nitric oxide gas Gas 10 ppm Continuous    ondansetron disintegrating tablet 8 mg Q8H PRN    oxyCODONE immediate release tablet 5 mg Q4H PRN    oxyCODONE immediate release tablet Tab 10 mg Q4H PRN    pantoprazole injection 40 mg BID    polyethylene glycol packet 17 g Daily    potassium chloride 20 mEq in 100 mL IVPB (FOR CENTRAL LINE ADMINISTRATION ONLY) PRN    potassium chloride 20 mEq in 100 mL IVPB (FOR CENTRAL LINE ADMINISTRATION ONLY) PRN    QUEtiapine tablet 50 mg Q6H PRN    senna-docusate 8.6-50 mg per tablet 1 tablet BID     senna-docusate 8.6-50 mg per tablet 2 tablet BID PRN    sodium chloride 0.9% flush 10 mL PRN    sodium chloride 0.9% flush 10 mL Q6H    And    sodium chloride 0.9% flush 10 mL PRN    sodium phosphate 20.01 mmol in dextrose 5 % (D5W) 250 mL IVPB PRN    sodium phosphate 30 mmol in dextrose 5 % (D5W) 250 mL IVPB PRN    sodium phosphate 39.99 mmol in dextrose 5 % (D5W) 250 mL IVPB PRN       Objective:     Vital Signs (Most Recent):  Temp: 98.4 °F (36.9 °C) (12/06/23 0715)  Pulse: 102 (12/06/23 0715)  Resp: (!) 28 (12/06/23 0500)  BP: (!) 80/0 (12/06/23 0200)  SpO2: 99 % (12/06/23 0715) Vital Signs (24h Range):  Temp:  [97.9 °F (36.6 °C)-99.3 °F (37.4 °C)] 98.4 °F (36.9 °C)  Pulse:  [] 102  Resp:  [22-42] 28  SpO2:  [90 %-100 %] 99 %  BP: (72-82)/(0) 80/0  Arterial Line BP: ()/() 87/68     Weight: 90.3 kg (199 lb) (12/06/23 0500)  Body mass index is 26.99 kg/m².  Body surface area is 2.14 meters squared.    I/O last 3 completed shifts:  In: 4777.4 [I.V.:3853.5; Blood:500; NG/GT:270; IV Piggyback:153.9]  Out: 3711 [Urine:698; Other:2638; Chest Tube:375]     Physical Exam  Vitals and nursing note reviewed.   Constitutional:       General: He is awake. He is not in acute distress.     Appearance: He is ill-appearing. He is not diaphoretic.      Interventions: Nasal cannula in place.   HENT:      Head: Normocephalic and atraumatic.      Right Ear: External ear normal.      Left Ear: External ear normal.      Nose:      Comments: NG tube to left nostril.     Mouth/Throat:      Mouth: Mucous membranes are moist.      Pharynx: Oropharynx is clear. No oropharyngeal exudate or posterior oropharyngeal erythema.   Eyes:      General: No scleral icterus.        Right eye: No discharge.         Left eye: No discharge.      Conjunctiva/sclera: Conjunctivae normal.   Neck:      Comments: Trialysis catheter to left internal jugular vein. Burnham Siva catheter in place.  Cardiovascular:      Rate and Rhythm: Normal  rate.      Heart sounds: Murmur heard.      Systolic murmur is present.      No friction rub. No gallop.   Pulmonary:      Effort: Tachypnea present.      Breath sounds: Rales present. No wheezing.      Comments: Bibasilar crackles appreciated.   Chest:      Comments: Two chest tubes in place with LVAD present. Chest remains open at this time.  Abdominal:      General: Bowel sounds are normal. There is no distension.      Palpations: Abdomen is soft.   Genitourinary:     Comments: Hampton catheter in place.  Musculoskeletal:      Cervical back: Neck supple.      Right lower leg: No edema.      Left lower leg: No edema.   Skin:     General: Skin is warm and dry.      Coloration: Skin is not jaundiced.   Neurological:      Mental Status: He is lethargic and confused.          Significant Labs:  ABGs:   Recent Labs   Lab 12/05/23 2055   PH 7.445   PCO2 27.5*   HCO3 18.9*   POCSATURATED 93   BE -5*     BMP:   Recent Labs   Lab 12/06/23 0410   GLU 98  96     140   K 4.2  4.2     106   CO2 19*  22*   BUN 33*  27*   CREATININE 2.0*  1.6*   CALCIUM 8.3*  8.3*   MG 2.1  2.1     CBC:   Recent Labs   Lab 12/06/23 0537   WBC 12.24   RBC 2.42*   HGB 7.1*   HCT 21.0*      MCV 87   MCH 29.3   MCHC 33.8     CMP:   Recent Labs   Lab 12/06/23 0410   GLU 98  96   CALCIUM 8.3*  8.3*   ALBUMIN 2.2*  2.2*  2.2*   PROT 5.8*  5.9*     140   K 4.2  4.2   CO2 19*  22*     106   BUN 33*  27*   CREATININE 2.0*  1.6*   ALKPHOS 115  121   ALT 17  17   AST 49*  47*   BILITOT 0.9  0.9     Coagulation:   Recent Labs   Lab 12/06/23 0410   INR 1.1   APTT 28.5     LFTs:   Recent Labs   Lab 12/06/23 0410   ALT 17  17   AST 49*  47*   ALKPHOS 115  121   BILITOT 0.9  0.9   PROT 5.8*  5.9*   ALBUMIN 2.2*  2.2*  2.2*     Microbiology Results (last 7 days)       Procedure Component Value Units Date/Time    Blood culture [[id number]] Collected: 11/28/23 1452    Order Status: Completed  Specimen: Blood from Peripheral, Upper Arm, Right Updated: 12/03/23 1612     Blood Culture, Routine No growth after 5 days.    Blood culture [[id number]] Collected: 11/28/23 1450    Order Status: Completed Specimen: Blood from Peripheral, Forearm, Right Updated: 12/03/23 1612     Blood Culture, Routine No growth after 5 days.          Specimen (24h ago, onward)      None          Significant Imaging:  I have reviewed all imagining in the last 24 hours.  Assessment/Plan:     Cardiac/Vascular  * Acute on chronic combined systolic and diastolic CHF, NYHA class 4  LVAD (left ventricular assist device) present  Pre-transplant evaluation for heart transplant  Patient is identified as having Combined Systolic and Diastolic heart failure that is Acute on chronic. CHF is currently uncontrolled due to JVD, Rales/crackles on pulmonary exam, and Pulmonary edema/pleural effusion on CXR. Latest ECHO performed and demonstrates- Results for orders placed during the hospital encounter of 11/09/23    Echo    Interpretation Summary    Left Ventricle: The left ventricle is moderately dilated. Mildly increased ventricular mass. Normal wall thickness. There is eccentric hypertrophy. Severe global hyperkinesis present. There is severely reduced systolic function with a visually estimated ejection fraction of 10 -15%. Grade III diastolic dysfunction.    Right Ventricle: Normal right ventricular cavity size. Systolic function is normal. Pacemaker lead present in the ventricle.    Left Atrium: Left atrium is severely dilated.    Mitral Valve: There is annular and bileaflet tethering. There is moderate regurgitation with a centrally directed jet.    Tricuspid Valve: There is mild regurgitation with a centrally directed jet.    Pulmonary Artery: The estimated pulmonary artery systolic pressure is 41 mmHg.    IVC/SVC: Normal venous pressure at 3 mmHg.    last BNP reviewed- and noted below   Recent Labs   Lab 12/06/23  0410   BNP 1,679*       -  management per primary team  - LVAD for mechanical support in addition to dobutamine and epinephrine for ionotropic support  - UF with CRRT, 50 mL/hr for goal of 1 liter negative in next 24 hours    Renal/  Acute renal failure with acute tubular necrosis superimposed on stage 3b chronic kidney disease  - suspect acute tubular injury secondary to hypotension/cardiogenic shock likely compounded by intra-arterial contrast and anemia with urinary sediment with granular casts  - will proceed with CRRT/Prismax at 25 mL/kg/hr with Phoxillium BK4/2.5 for metabolic clearance and volume management (goal to keep net negative ~1 liter in the next 24 hours), consent obtained via wife at bedside  - Q8H RFPs and magnesium levels per CRRT protocol   - renal diet/tube feeds when not NPO, volume restriction per primary team  - strict I/O's and daily weights  - keep MAP > 65 mmHg  - renally all dose medications to eGFR  - avoid nephrotoxic agents wean feasible (i.e. NSAIDs, intra-arterial contrast, supra-therapeutic vancomycin levels, etc.)    Thank you for your consult. I will follow-up with patient. Please contact us if you have any additional questions.    Henok Ayon MD  Nephrology  [facility]

## 2023-12-06 NOTE — PROGRESS NOTES
[facility]  Heart Transplant  Progress Note    Patient Name: Radha Abbott  MRN: [id number]  Admission Date: 11/9/2023  Hospital Length of Stay: 27 days  Attending Physician: Yuri Washington MD  Primary Care Provider: Vasu Kong MD  Principal Problem:Acute on chronic combined systolic and diastolic CHF, NYHA class 4    Subjective:   Interval History: Patient POD #4 s/p HM3 LVAD implant. No acute events overnight. Tolerated SCUF/SLED and mental status remains altered despite improvement in BUN. Supported on  5, epi 0.06, and Ketty 10 ppm with plans to start weaning Epi today. LVAD RPMs at 4800.     Hemodynamics:  CVP 13  Sv02 47  CO 6.8  CI 3.1      Continuous Infusions:   sodium chloride 0.9%      sodium chloride 0.9% Stopped (12/05/23 1634)    dextrose 10 % in water (D10W)      DOBUTamine 5 mcg/kg/min (12/06/23 1100)    EPINEPHrine      heparin (porcine) in 5 % dex 600 Units/hr (12/06/23 1100)    nitric oxide gas      vasopressin 0.04 Units/min (12/06/23 1102)     Scheduled Meds:   amiodarone  400 mg Per NG tube Daily    atorvastatin  40 mg Per NG tube Daily    ferrous gluconate  324 mg Per NG tube Daily with breakfast    insulin aspart U-100  3 Units Subcutaneous 6 times per day    midodrine  10 mg Oral Q8H    pantoprazole  40 mg Intravenous BID    polyethylene glycol  17 g Per NG tube Daily    senna-docusate 8.6-50 mg  1 tablet Per NG tube BID    sodium chloride 0.9%  10 mL Intravenous Q6H    warfarin  2 mg Per NG tube Daily     PRN Meds:acetaminophen, albumin human 5%, albuterol sulfate, bisacodyL, dextrose 10 % in water (D10W), dextrose 10%, dextrose 10%, glucagon (human recombinant), insulin aspart U-100, magnesium hydroxide 400 mg/5 ml, magnesium sulfate IVPB, mupirocin, ondansetron, potassium chloride, potassium chloride, QUEtiapine, senna-docusate 8.6-50 mg, sodium chloride 0.9%, Flushing PICC/Midline Protocol **AND** sodium chloride 0.9% **AND** sodium chloride 0.9%,  sodium phosphate 20.01 mmol in dextrose 5 % (D5W) 250 mL IVPB, sodium phosphate 30 mmol in dextrose 5 % (D5W) 250 mL IVPB, sodium phosphate 39.99 mmol in dextrose 5 % (D5W) 250 mL IVPB    Review of patient's allergies indicates:  No Known Allergies  Objective:     Vital Signs (Most Recent):  Temp: 98.4 °F (36.9 °C) (12/06/23 1000)  Pulse: 92 (12/06/23 1134)  Resp: (!) 28 (12/06/23 0500)  BP: (!) 66/0 (12/06/23 1100)  SpO2: (!) 89 % (12/06/23 1100) Vital Signs (24h Range):  Temp:  [97.7 °F (36.5 °C)-99.3 °F (37.4 °C)] 98.4 °F (36.9 °C)  Pulse:  [] 92  Resp:  [22-42] 28  SpO2:  [85 %-100 %] 89 %  BP: (66-82)/(0) 66/0  Arterial Line BP: ()/() 70/56     Patient Vitals for the past 72 hrs (Last 3 readings):   Weight   12/06/23 1107 92.5 kg (204 lb)   12/06/23 0500 90.3 kg (199 lb)   12/05/23 0500 91.6 kg (202 lb)       Body mass index is 27.67 kg/m².      Intake/Output Summary (Last 24 hours) at 12/6/2023 1140  Last data filed at 12/6/2023 1100  Gross per 24 hour   Intake 3437.3 ml   Output 3833 ml   Net -395.7 ml         Hemodynamic Parameters:  PAP: (46)/(11) 46/11  PAP (Mean):  [24 mmHg] 24 mmHg    Telemetry: NSR        Physical Exam  Constitutional:       Appearance: He is ill-appearing.      Comments: Confused. Not following commands    HENT:      Head: Normocephalic and atraumatic.   Cardiovascular:      Rate and Rhythm: Normal rate and regular rhythm.      Comments: LVAD hum  Pulmonary:      Breath sounds: Rhonchi and rales present.   Chest:      Comments: CT x2  Abdominal:      General: Abdomen is flat. Bowel sounds are normal. There is no distension.      Palpations: Abdomen is soft.   Musculoskeletal:         General: Normal range of motion.      Cervical back: Neck supple.   Skin:     General: Skin is warm and dry.   Neurological:      Mental Status: He is disoriented.      Comments: Not following commands. Jessica            Significant Labs:  CBC:  Recent Labs   Lab 12/05/23  0745  12/05/23  0914 12/05/23  1232 12/06/23 0410 12/06/23  0537   WBC 13.35*  --   --  12.35 12.24   RBC 2.47*  --   --  1.82* 2.42*   HGB 7.3*  --   --  5.2* 7.1*   HCT 21.2*   < > 30* 15.4* 21.0*     --   --  236 209   MCV 86  --   --  85 87   MCH 29.6  --   --  28.6 29.3   MCHC 34.4  --   --  33.8 33.8    < > = values in this interval not displayed.       BNP:  Recent Labs   Lab 11/29/23 1834 12/04/23 0412 12/06/23 0410   * 285* 1,679*       CMP:  Recent Labs   Lab 12/05/23 0406 12/05/23  0745 12/05/23 1821 12/05/23 2330 12/06/23 0410   * 205*  --  104 98  96   CALCIUM 8.4* 8.5*  --  8.2* 8.3*  8.3*   ALBUMIN 2.5* 2.5*  --  2.3* 2.2*  2.2*  2.2*   PROT 6.0 6.0  --   --  5.8*  5.9*    136  --  140 140  140   K 3.9 4.0 4.0 4.1 4.2  4.2   CO2 18* 17*  --  19* 19*  22*    102  --  106 106  106   BUN 69* 72*  --  45* 33*  27*   CREATININE 3.3* 3.5*  --  2.3* 2.0*  1.6*   ALKPHOS 87 90  --   --  115  121   ALT 24 24  --   --  17  17   AST 37 38  --   --  49*  47*   BILITOT 1.1* 0.9  --   --  0.9  0.9        Coagulation:   Recent Labs   Lab 12/05/23 1821 12/05/23 2330 12/06/23 0410   INR 1.1 1.1 1.1   APTT 29.6 27.2 28.5       LDH:  Recent Labs   Lab 12/04/23 0412 12/05/23 0406 12/06/23  0410   * 511* 584*       Microbiology:  Microbiology Results (last 7 days)       Procedure Component Value Units Date/Time    Blood culture [[id number]] Collected: 11/28/23 1452    Order Status: Completed Specimen: Blood from Peripheral, Upper Arm, Right Updated: 12/03/23 1612     Blood Culture, Routine No growth after 5 days.    Blood culture [[id number]] Collected: 11/28/23 1450    Order Status: Completed Specimen: Blood from Peripheral, Forearm, Right Updated: 12/03/23 1612     Blood Culture, Routine No growth after 5 days.            BMP:   Recent Labs   Lab 12/06/23  0410   GLU 98  96     140   K 4.2  4.2     106   CO2 19*  22*   BUN 33*  27*    CREATININE 2.0*  1.6*   CALCIUM 8.3*  8.3*   MG 2.1  2.1       I have reviewed all pertinent labs within the past 24 hours.    Estimated Creatinine Clearance: 53.2 mL/min (A) (based on SCr of 1.6 mg/dL (H)).    Diagnostic Results:  I have reviewed all pertinent imaging results/findings within the past 24 hours.  Assessment and Plan:     63 year old male with hx of CAD s/p 3v CABG (unclear anatomy, 2009), ICM with a recent EF of 15-20% s/p ICD (medtronik 2009), DM2 (a1c 7.7), HTN, HLD, Vfib on amio who presents to the ED with CC of SOB     Pt was recently admitted to [facility] as a transfer.  He was started on a Lasix gtt and did well.  Started on gDMT and discharged home on  5 with plans to follow up in [facility] clinic for ongoing transplant evaluation at another facility.  He says that about a few days ago he started to notice LE swelling.  This turned into Nelson and orthopnea.  He says he can't walk to the bathroom without getting SOB.  He also complains of weight gain.  He takes torsemide 20mg BID at home and was told to trial additional Lasix which he did without any improvement.  He was rx metolazone but has not been filled.  He came to the ED     In the ED he was AF with stable VS on RA.  CBC showing chronic anemia.  CMP notable for acute on chronic CKD with baseline around 2.1 and Cr now 2.6.  BNP elevated.  Lactate neg.  CXR showing pulmonary edema.  I evaluated the pt at the bedside.  Bedside TTE showing CVP >15.  He was subsequently admitted to the CCU for diuresis.     He was continued on his home  and was started on a lasix gtt, which he responded well to overnight (net -1700cc. He feels much better this morning as well. Our transplant coordinators have been working on insurance approval and he is now being transferred to [facility] service for transplant evaluation.     * Acute on chronic combined systolic and diastolic CHF, NYHA class 4  Pt with known ICM with an EF of 25% on home  presented with  decompensated HF.  Not in cardiogenic shock    RHC 11/14: RA 14, PA 70/35, PCWP 32, CO 4.1, CI 2.1.   Repeat RHC 11/20 RA RA  20  PA 60/29 (39)  PCWP 29    CO  4.6 l/min  CI  2.3 l/min/m2  2D echo 11/21 showed EF 15-20%, mild RV dysfunction, mild MR, LVEDD 6.9     Repeat on 11/24 showed EF 10-15%, RV mildly reduced, mild-mod MR, LVEDD 6.42  - Home  gtt at 5  - home GDMT: entresto 24/26mg BID (on hold 2/2 IVÁN), Hydralazine 25 q8h - held prior to LVAD implant   - home diuretics: Was taking lasix 40 bid  - Bridged to LVAD with IABP s/p HM3 implant 12/1        -PT/OT and nutrition   -s/p LVAD (12/1)  - Remains on  5, epi 0.06, Ketty 10 ppm  -Volume removal with HD   - CTS primary    Altered mental status  -multifactorial  -likely metabolic encephalopathy +/- icu delirium   -Continue RRT for metabolic clearance  -provide frequent re-orientation  -Promote sleep/wake cycle   -May consider CTH if no improvement  -supportive care     LVAD (left ventricular assist device) present  -HeartMate 3 Implanted  12/1/2023  as DT  -CTS Primary  -Implanted by Dr. Washington  -HOLD Coumadin,  Goal INR 2.0-3.0 . Subtherapeutic today. On Heparin gtt   -Antiplatelets Not on ASA  -LDH is stable overall today. Will continue to monitor daily.  -Speed set at  4800   rpm, LSL 4400 rpm   -Interrogation notable for no events  -Not listed for OHTx, declined for OHTX due to comorbidities         Procedure: Device Interrogation Including analysis of device parameters  Current Settings: Ventricular Assist Device  Review of device function is stable/unstable stable        12/6/2023    11:00 AM 12/6/2023    10:00 AM 12/6/2023     9:00 AM 12/6/2023     8:00 AM 12/6/2023     7:00 AM 12/6/2023     6:00 AM 12/6/2023     5:00 AM   TXP LVAD INTERROGATIONS   Type HeartMate3 HeartMate3 HeartMate3 HeartMate3 HeartMate3 HeartMate3 HeartMate3   Flow 4.3 4.4 3.8 3.7 3.5 3.6 3.4   Speed 4800 4800 4800 4800 4800 4800 4800   PI 3.5 3.2 4.2 4.5 5.3 4.8 6.2   Power  ([place]) 3.1 3.1 3.1 3.1 3.2 3.1 3.1   LSL 4400 4400 4400 4400 4400 4400 4400   Pulsatility Pulse Pulse Pulse Pulse Pulse Pulse Pulse         PAF (paroxysmal atrial fibrillation)  Known hx of pAF. In sinus rhythm on admission, but 1 run of AF RVR overnight. He spontaneously converted.  - Continue home amiodarone 400mg qd  - Stop home Eliquis and continue heparin gtt while in the hospital.       Acute renal failure with acute tubular necrosis superimposed on stage 3b chronic kidney disease  IVÁN on CKD2. Baseline Cr of 1.7-2.0.   - Hold ARNi, entresto  -Trend BMPs daily   -SrCr 2.0 today  -SCUF/SLED for volume removal   -Nephrology was consulted     Type 2 diabetes mellitus without complication, without long-term current use of insulin  -A1c 7.6, not insulin dependent  - MDSSI  -Currently on Insulin gtt   - Endocrine following, appreciate assistance with blood glucose management    CAD (coronary artery disease)  -S/p 3vCABG in 2009  - home ASA, HI statin- on hold after surgery     Ventricular tachycardia  Hx VT s/p ICD placement (medtronic 2009)  - Continue home amio 400mg qd      .Uninterrupted Critical Care/Counseling Time (not including procedures): 55 mins     Hugh Duran NP  Heart Transplant  [facility]

## 2023-12-06 NOTE — SUBJECTIVE & OBJECTIVE
Interval History: Patient POD #4 s/p HM3 LVAD implant. No acute events overnight. Tolerated SCUF/SLED and mental status remains altered despite improvement in BUN. Supported on  5, epi 0.06, and Ketty 10 ppm with plans to start weaning Epi today. LVAD RPMs at 4800.     Hemodynamics:  CVP 13  Sv02 47  CO 6.8  CI 3.1      Continuous Infusions:   sodium chloride 0.9%      sodium chloride 0.9% Stopped (12/05/23 1634)    dextrose 10 % in water (D10W)      DOBUTamine 5 mcg/kg/min (12/06/23 1100)    EPINEPHrine      heparin (porcine) in 5 % dex 600 Units/hr (12/06/23 1100)    nitric oxide gas      vasopressin 0.04 Units/min (12/06/23 1102)     Scheduled Meds:   amiodarone  400 mg Per NG tube Daily    atorvastatin  40 mg Per NG tube Daily    ferrous gluconate  324 mg Per NG tube Daily with breakfast    insulin aspart U-100  3 Units Subcutaneous 6 times per day    midodrine  10 mg Oral Q8H    pantoprazole  40 mg Intravenous BID    polyethylene glycol  17 g Per NG tube Daily    senna-docusate 8.6-50 mg  1 tablet Per NG tube BID    sodium chloride 0.9%  10 mL Intravenous Q6H    warfarin  2 mg Per NG tube Daily     PRN Meds:acetaminophen, albumin human 5%, albuterol sulfate, bisacodyL, dextrose 10 % in water (D10W), dextrose 10%, dextrose 10%, glucagon (human recombinant), insulin aspart U-100, magnesium hydroxide 400 mg/5 ml, magnesium sulfate IVPB, mupirocin, ondansetron, potassium chloride, potassium chloride, QUEtiapine, senna-docusate 8.6-50 mg, sodium chloride 0.9%, Flushing PICC/Midline Protocol **AND** sodium chloride 0.9% **AND** sodium chloride 0.9%, sodium phosphate 20.01 mmol in dextrose 5 % (D5W) 250 mL IVPB, sodium phosphate 30 mmol in dextrose 5 % (D5W) 250 mL IVPB, sodium phosphate 39.99 mmol in dextrose 5 % (D5W) 250 mL IVPB    Review of patient's allergies indicates:  No Known Allergies  Objective:     Vital Signs (Most Recent):  Temp: 98.4 °F (36.9 °C) (12/06/23 1000)  Pulse: 92 (12/06/23 1134)  Resp:  (!) 28 (12/06/23 0500)  BP: (!) 66/0 (12/06/23 1100)  SpO2: (!) 89 % (12/06/23 1100) Vital Signs (24h Range):  Temp:  [97.7 °F (36.5 °C)-99.3 °F (37.4 °C)] 98.4 °F (36.9 °C)  Pulse:  [] 92  Resp:  [22-42] 28  SpO2:  [85 %-100 %] 89 %  BP: (66-82)/(0) 66/0  Arterial Line BP: ()/() 70/56     Patient Vitals for the past 72 hrs (Last 3 readings):   Weight   12/06/23 1107 92.5 kg (204 lb)   12/06/23 0500 90.3 kg (199 lb)   12/05/23 0500 91.6 kg (202 lb)       Body mass index is 27.67 kg/m².      Intake/Output Summary (Last 24 hours) at 12/6/2023 1140  Last data filed at 12/6/2023 1100  Gross per 24 hour   Intake 3437.3 ml   Output 3833 ml   Net -395.7 ml         Hemodynamic Parameters:  PAP: (46)/(11) 46/11  PAP (Mean):  [24 mmHg] 24 mmHg    Telemetry: NSR        Physical Exam  Constitutional:       Appearance: He is ill-appearing.      Comments: Confused. Not following commands    HENT:      Head: Normocephalic and atraumatic.   Cardiovascular:      Rate and Rhythm: Normal rate and regular rhythm.      Comments: LVAD hum  Pulmonary:      Breath sounds: Rhonchi and rales present.   Chest:      Comments: CT x2  Abdominal:      General: Abdomen is flat. Bowel sounds are normal. There is no distension.      Palpations: Abdomen is soft.   Musculoskeletal:         General: Normal range of motion.      Cervical back: Neck supple.   Skin:     General: Skin is warm and dry.   Neurological:      Mental Status: He is disoriented.      Comments: Not following commands. Jessica            Significant Labs:  CBC:  Recent Labs   Lab 12/05/23  0745 12/05/23  0914 12/05/23  1232 12/06/23  0410 12/06/23  0537   WBC 13.35*  --   --  12.35 12.24   RBC 2.47*  --   --  1.82* 2.42*   HGB 7.3*  --   --  5.2* 7.1*   HCT 21.2*   < > 30* 15.4* 21.0*     --   --  236 209   MCV 86  --   --  85 87   MCH 29.6  --   --  28.6 29.3   MCHC 34.4  --   --  33.8 33.8    < > = values in this interval not displayed.       BNP:  Recent  Labs   Lab 11/29/23  1834 12/04/23  0412 12/06/23  0410   * 285* 1,679*       CMP:  Recent Labs   Lab 12/05/23  0406 12/05/23  0745 12/05/23  1821 12/05/23  2330 12/06/23  0410   * 205*  --  104 98  96   CALCIUM 8.4* 8.5*  --  8.2* 8.3*  8.3*   ALBUMIN 2.5* 2.5*  --  2.3* 2.2*  2.2*  2.2*   PROT 6.0 6.0  --   --  5.8*  5.9*    136  --  140 140  140   K 3.9 4.0 4.0 4.1 4.2  4.2   CO2 18* 17*  --  19* 19*  22*    102  --  106 106  106   BUN 69* 72*  --  45* 33*  27*   CREATININE 3.3* 3.5*  --  2.3* 2.0*  1.6*   ALKPHOS 87 90  --   --  115  121   ALT 24 24  --   --  17  17   AST 37 38  --   --  49*  47*   BILITOT 1.1* 0.9  --   --  0.9  0.9        Coagulation:   Recent Labs   Lab 12/05/23  1821 12/05/23 2330 12/06/23 0410   INR 1.1 1.1 1.1   APTT 29.6 27.2 28.5       LDH:  Recent Labs   Lab 12/04/23  0412 12/05/23  0406 12/06/23  0410   * 511* 584*       Microbiology:  Microbiology Results (last 7 days)       Procedure Component Value Units Date/Time    Blood culture [[id number]] Collected: 11/28/23 1452    Order Status: Completed Specimen: Blood from Peripheral, Upper Arm, Right Updated: 12/03/23 1612     Blood Culture, Routine No growth after 5 days.    Blood culture [[id number]] Collected: 11/28/23 1450    Order Status: Completed Specimen: Blood from Peripheral, Forearm, Right Updated: 12/03/23 1612     Blood Culture, Routine No growth after 5 days.            BMP:   Recent Labs   Lab 12/06/23 0410   GLU 98  96     140   K 4.2  4.2     106   CO2 19*  22*   BUN 33*  27*   CREATININE 2.0*  1.6*   CALCIUM 8.3*  8.3*   MG 2.1  2.1       I have reviewed all pertinent labs within the past 24 hours.    Estimated Creatinine Clearance: 53.2 mL/min (A) (based on SCr of 1.6 mg/dL (H)).    Diagnostic Results:  I have reviewed all pertinent imaging results/findings within the past 24 hours.

## 2023-12-06 NOTE — PROCEDURES
Radha Abbott is a 63 y.o. male patient.    Temp: 97.6 °F (36.4 °C) (12/06/23 1100)  Pulse: 92 (12/06/23 1522)  Resp: (!) 28 (12/06/23 0500)  BP: (!) 66/0 (12/06/23 1100)  SpO2: 96 % (12/06/23 1522)  Weight: 92.5 kg (204 lb) (12/06/23 1107)  Height: 6' (182.9 cm) (12/05/23 0723)  Mallampati Scale: Class III  ASA Classification: Class 3    Bronchoscopy      Date of Procedure:   12/6/2023     Procedure: Bronchoscopy, Therapeutic  Bronchoalveolar lavage, BAL     Provider: Josr Morales MD     Assisting Provider: Dr. Henry Khanna       Pre-Procedure Diagnosis: Hypoxic respiratory failure      Post-Procedure Diagnosis: Hypoxic respiratory failure      Indication: 63 y.o. year-old male with LVAD with Dr. Washington on 12/1/2023 and PMHx CAD s/p 3V CABG in 2009  EF 10-15%, grade 3 diastolic dysfunction with ICD in place, moderate MR, PASP 41 on home , admitted for exacerbation of heart failure. Bronchoscopy was performed for therapeutic purposes given his ongoing need for ventilatory support requiring intubation. The risks and benefits were discussed with his wife who agreed for us to proceed. Informed consent was obtained.     Anesthesia: General Anesthesia     Technical Procedures Used: Flexible bronchoscopy     Description of the Findings of the Procedure:      Patient was consented for the procedure with all risk and benefit of the procedure explained in detail.  Patient's daughter was given the opportunity to ask questions and all concerns were answered.  The bronchocope was inserted into the main airway via the endotracheal tube. An anatomical survey was done of the main airways and the subsegmental bronchus of the right lung.  There was thick mucus plugging the RUL which was aspirated and lavaged with normal saline.  A bronchialalveolar lavage was performed using aliquots of normal saline instilled into the airways then aspirated back and was sent for culture. A repeat survey of the RUL and its segmental  branches revealed that it was widely patent. A quick survey of the RML and RLL revealed some secretions that were aspirated as well. There were some clear secretions at the LLL that were aspirated and sent for culture. This then completed the procedure.     Significant Surgical Tasks Conducted by the Assistant(s), if Applicable: flexible bronchoscopy.     Complications: None; patient tolerated the procedure well.     Estimated Blood Loss (EBL): 0 ml           Implants: None     Specimens: BAL        Disposition: ICU intubated requiring pressor medication     Attestation: Dr. Gilbert Khanna was present  for the entire procedure.

## 2023-12-06 NOTE — PLAN OF CARE
SICU PLAN OF CARE NOTE    Dx: Acute on chronic combined systolic and diastolic CHF, NYHA class 4    Goals of Care: comfort, improve neuro status, improve kidney function      Vital Signs (last 12 hours):   Temp:  [98.2 °F (36.8 °C)-99.2 °F (37.3 °C)]   Pulse:  []   Resp:  [22-38]   BP: (72-80)/(0)   SpO2:  [90 %-100 %]   Arterial Line BP: ()/(6-147)      Neuro: Arouses to Voice and Moves All Extremities    Cardiac:    Respiratory: Nasal Cannula w/ Ketty    Gtts: Epinephrine , Dobutamine, Lasix, and Insulin    Urine Output: Urinary Catheter 15 cc/shift    Dialysis: SCUF, UF Rate: to start tonight/hr    Drains: Chest Tube, total output 80 cc /  shift    Diet: NPO    VAD HM3 4800, Flows 3.2-3.7    Restraints Bilateral soft wrist        Labs/Accuchecks: accu checks q4h/K, Mg, Ptt q6h    Skin: Incision CDI with prevena dressing    Shift Events: extubated, PICC placed, swan/introducer removed, CRRT will start in PM

## 2023-12-06 NOTE — PT/OT/SLP PROGRESS
Speech Language Pathology  Discharge    Radha Abbott  MRN: [id number]    Patient not seen today secondary to pt intubated at this time. Please re-consult when medically appropriate. No further ST warranted at this time.   12/6/2023

## 2023-12-06 NOTE — ASSESSMENT & PLAN NOTE
Pt with known ICM with an EF of 25% on home  presented with decompensated HF.  Not in cardiogenic shock    RHC 11/14: RA 14, PA 70/35, PCWP 32, CO 4.1, CI 2.1.   Repeat RHC 11/20 RA RA  20  PA 60/29 (39)  PCWP 29    CO  4.6 l/min  CI  2.3 l/min/m2  2D echo 11/21 showed EF 15-20%, mild RV dysfunction, mild MR, LVEDD 6.9     Repeat on 11/24 showed EF 10-15%, RV mildly reduced, mild-mod MR, LVEDD 6.42  - Home  gtt at 5  - home GDMT: entresto 24/26mg BID (on hold 2/2 IVÁN), Hydralazine 25 q8h - held prior to LVAD implant   - home diuretics: Was taking lasix 40 bid  - Bridged to LVAD with IABP s/p HM3 implant 12/1        -PT/OT and nutrition   -s/p LVAD (12/1)  - Remains on  5, epi 0.06, Ketty 10 ppm  - CTS primary

## 2023-12-06 NOTE — ASSESSMENT & PLAN NOTE
BG goal 140-180     - Patient fell off of insulin gtt overnight- given plan to start TF today, will transition to q4 hour SSI novolog with q4hr POCT glucose. Will start 3 units scheduled novolog once TF starts running.   - Novolog (aspart) insulin prn for BG excursions LDC (150/50)   - Hypoglycemia protocol in place    ** Please notify Endocrine for any change and/or advance in diet**  ** Please call Endocrine for any BG related issues **    Discharge Planning:   TBD. Please notify endocrinology prior to discharge.

## 2023-12-06 NOTE — SUBJECTIVE & OBJECTIVE
Interval History: Patient seen and examined this AM. Wife at bedside. Tolerating SLED/SCUFF overnight following Cathflow to trialysis catheter. Afebrile with pulse ranging from 100=90s bpm. Systolic blood pressures ranging from 90-70s mmHg via arterial line on epinephrine 0.06 (in addition to dobutamine at 5 for ionotropic support). He is saturating +92% on 10 LPM and HFNC with documented UOP of 283 mL in the last 24 hours. He is net negative ~77 mL. He remains altered at this time despite BUN of 33.    Review of patient's allergies indicates:  No Known Allergies  Current Facility-Administered Medications   Medication Frequency    0.9%  NaCl infusion (CRRT USE ONLY) Continuous    0.9%  NaCl infusion Continuous    acetaminophen tablet 650 mg Q6H PRN    albumin human 5% bottle 25 g PRN    albuterol sulfate nebulizer solution 2.5 mg Q4H PRN    amiodarone tablet 400 mg Daily    atorvastatin tablet 40 mg Daily    bisacodyL suppository 10 mg Daily PRN    dextrose 10% bolus 125 mL 125 mL PRN    dextrose 10% bolus 250 mL 250 mL PRN    DOBUtamine 1000 mg in D5W 250 mL infusion Continuous    EPINEPHrine 5 mg in dextrose 5% 250 mL infusion (premix) Continuous    fentaNYL 50 mcg/mL injection 25 mcg Q1H PRN    ferrous gluconate tablet 324 mg Daily with breakfast    furosemide (LASIX) 200 mg in sodium chloride 0.9% SolP 100 mL continuous infusion (conc: 2 mg/mL) Continuous    gabapentin capsule 300 mg Nightly PRN    glucagon (human recombinant) injection 1 mg PRN    glucose chewable tablet 16 g PRN    glucose chewable tablet 24 g PRN    heparin 25,000 units in dextrose 5% 250 mL (100 units/mL) infusion (heparin infusion - NO NOMOGRAM) Continuous    HYDROmorphone injection 0.5 mg Q2H PRN    insulin aspart U-100 pen 0-5 Units PRN    insulin regular in 0.9 % NaCl 100 unit/100 mL (1 unit/mL) infusion Continuous    magnesium hydroxide 400 mg/5 ml suspension 2,400 mg Daily PRN    magnesium sulfate 2g in water 50mL IVPB (premix) PRN     mupirocin 2 % ointment On Call Procedure    mupirocin 2 % ointment BID    niCARdipine 40 mg/200 mL (0.2 mg/mL) infusion Continuous    nitric oxide gas Gas 10 ppm Continuous    ondansetron disintegrating tablet 8 mg Q8H PRN    oxyCODONE immediate release tablet 5 mg Q4H PRN    oxyCODONE immediate release tablet Tab 10 mg Q4H PRN    pantoprazole injection 40 mg BID    polyethylene glycol packet 17 g Daily    potassium chloride 20 mEq in 100 mL IVPB (FOR CENTRAL LINE ADMINISTRATION ONLY) PRN    potassium chloride 20 mEq in 100 mL IVPB (FOR CENTRAL LINE ADMINISTRATION ONLY) PRN    QUEtiapine tablet 50 mg Q6H PRN    senna-docusate 8.6-50 mg per tablet 1 tablet BID    senna-docusate 8.6-50 mg per tablet 2 tablet BID PRN    sodium chloride 0.9% flush 10 mL PRN    sodium chloride 0.9% flush 10 mL Q6H    And    sodium chloride 0.9% flush 10 mL PRN    sodium phosphate 20.01 mmol in dextrose 5 % (D5W) 250 mL IVPB PRN    sodium phosphate 30 mmol in dextrose 5 % (D5W) 250 mL IVPB PRN    sodium phosphate 39.99 mmol in dextrose 5 % (D5W) 250 mL IVPB PRN       Objective:     Vital Signs (Most Recent):  Temp: 98.4 °F (36.9 °C) (12/06/23 0715)  Pulse: 102 (12/06/23 0715)  Resp: (!) 28 (12/06/23 0500)  BP: (!) 80/0 (12/06/23 0200)  SpO2: 99 % (12/06/23 0715) Vital Signs (24h Range):  Temp:  [97.9 °F (36.6 °C)-99.3 °F (37.4 °C)] 98.4 °F (36.9 °C)  Pulse:  [] 102  Resp:  [22-42] 28  SpO2:  [90 %-100 %] 99 %  BP: (72-82)/(0) 80/0  Arterial Line BP: ()/() 87/68     Weight: 90.3 kg (199 lb) (12/06/23 0500)  Body mass index is 26.99 kg/m².  Body surface area is 2.14 meters squared.    I/O last 3 completed shifts:  In: 4777.4 [I.V.:3853.5; Blood:500; NG/GT:270; IV Piggyback:153.9]  Out: 3711 [Urine:698; Other:2638; Chest Tube:375]     Physical Exam  Vitals and nursing note reviewed.   Constitutional:       General: He is awake. He is not in acute distress.     Appearance: He is ill-appearing. He is not diaphoretic.       Interventions: Nasal cannula in place.   HENT:      Head: Normocephalic and atraumatic.      Right Ear: External ear normal.      Left Ear: External ear normal.      Nose:      Comments: NG tube to left nostril.     Mouth/Throat:      Mouth: Mucous membranes are moist.      Pharynx: Oropharynx is clear. No oropharyngeal exudate or posterior oropharyngeal erythema.   Eyes:      General: No scleral icterus.        Right eye: No discharge.         Left eye: No discharge.      Conjunctiva/sclera: Conjunctivae normal.   Neck:      Comments: Trialysis catheter to left internal jugular vein. [place] Siva catheter in place.  Cardiovascular:      Rate and Rhythm: Normal rate.      Heart sounds: Murmur heard.      Systolic murmur is present.      No friction rub. No gallop.   Pulmonary:      Effort: Tachypnea present.      Breath sounds: Rales present. No wheezing.      Comments: Bibasilar crackles appreciated.   Chest:      Comments: Two chest tubes in place with LVAD present. Chest remains open at this time.  Abdominal:      General: Bowel sounds are normal. There is no distension.      Palpations: Abdomen is soft.   Genitourinary:     Comments: [place] catheter in place.  Musculoskeletal:      Cervical back: Neck supple.      Right lower leg: No edema.      Left lower leg: No edema.   Skin:     General: Skin is warm and dry.      Coloration: Skin is not jaundiced.   Neurological:      Mental Status: He is lethargic and confused.          Significant Labs:  ABGs:   Recent Labs   Lab 12/05/23 2055   PH 7.445   PCO2 27.5*   HCO3 18.9*   POCSATURATED 93   BE -5*     BMP:   Recent Labs   Lab 12/06/23  0410   GLU 98  96     140   K 4.2  4.2     106   CO2 19*  22*   BUN 33*  27*   CREATININE 2.0*  1.6*   CALCIUM 8.3*  8.3*   MG 2.1  2.1     CBC:   Recent Labs   Lab 12/06/23  0537   WBC 12.24   RBC 2.42*   HGB 7.1*   HCT 21.0*      MCV 87   MCH 29.3   MCHC 33.8     CMP:   Recent Labs   Lab 12/06/23 0410    GLU 98  96   CALCIUM 8.3*  8.3*   ALBUMIN 2.2*  2.2*  2.2*   PROT 5.8*  5.9*     140   K 4.2  4.2   CO2 19*  22*     106   BUN 33*  27*   CREATININE 2.0*  1.6*   ALKPHOS 115  121   ALT 17  17   AST 49*  47*   BILITOT 0.9  0.9     Coagulation:   Recent Labs   Lab 12/06/23  0410   INR 1.1   APTT 28.5     LFTs:   Recent Labs   Lab 12/06/23  0410   ALT 17  17   AST 49*  47*   ALKPHOS 115  121   BILITOT 0.9  0.9   PROT 5.8*  5.9*   ALBUMIN 2.2*  2.2*  2.2*     Microbiology Results (last 7 days)       Procedure Component Value Units Date/Time    Blood culture [[id number]] Collected: 11/28/23 1452    Order Status: Completed Specimen: Blood from Peripheral, Upper Arm, Right Updated: 12/03/23 1612     Blood Culture, Routine No growth after 5 days.    Blood culture [[id number]] Collected: 11/28/23 1450    Order Status: Completed Specimen: Blood from Peripheral, Forearm, Right Updated: 12/03/23 1612     Blood Culture, Routine No growth after 5 days.          Specimen (24h ago, onward)      None          Significant Imaging:  I have reviewed all imagining in the last 24 hours.

## 2023-12-06 NOTE — EICU
Intervention Initiated From:  Bedside    Theo intervened regarding:  Documentation    Comments: Called to bedside for art line exchange.  Consent confirmed with bedside.  Physician verification completed.  Time out done using proper pt identifiers.  Art line exchanged under supervision of MD Morales.  Pt tolerated procedure well.

## 2023-12-06 NOTE — ASSESSMENT & PLAN NOTE
Patient is identified as having Combined Systolic and Diastolic heart failure that is Acute on chronic. CHF is currently uncontrolled due to JVD, Rales/crackles on pulmonary exam, and Pulmonary edema/pleural effusion on CXR. Latest ECHO performed and demonstrates- Results for orders placed during the hospital encounter of 11/09/23    Echo    Interpretation Summary    Left Ventricle: The left ventricle is moderately dilated. Mildly increased ventricular mass. Normal wall thickness. There is eccentric hypertrophy. Severe global hyperkinesis present. There is severely reduced systolic function with a visually estimated ejection fraction of 10 -15%. Grade III diastolic dysfunction.    Right Ventricle: Normal right ventricular cavity size. Systolic function is normal. Pacemaker lead present in the ventricle.    Left Atrium: Left atrium is severely dilated.    Mitral Valve: There is annular and bileaflet tethering. There is moderate regurgitation with a centrally directed jet.    Tricuspid Valve: There is mild regurgitation with a centrally directed jet.    Pulmonary Artery: The estimated pulmonary artery systolic pressure is 41 mmHg.    IVC/SVC: Normal venous pressure at 3 mmHg.    last BNP reviewed- and noted below   Recent Labs   Lab 12/06/23  0410   BNP 1,679*       - management per primary team  - LVAD for mechanical support in addition to dobutamine and epinephrine for ionotropic support  - UF with CRRT, 50 mL/hr for goal of 1 liter negative in next 24 hours

## 2023-12-06 NOTE — PROGRESS NOTES
12/06/23 0158   Treatment   Treatment Type SLED   Treatment Status Restart   Dialysis Machine Number [id number]   Dialyzer Time (hours) 0   BVP (Liters) 0 L   Solutions Labeled and Current  Yes   Access Temporary Cath   Catheter Dressing Intact  Yes   Alarms Engaged Yes   CRRT Comments CRRT RST   $ CRRT Charges   $ CRRT Charges Restart     Report received from primary RN. CRRT restarted per MD order.

## 2023-12-06 NOTE — PROCEDURES
Radha Abbott is a 63 y.o. male patient.    Temp: 97.6 °F (36.4 °C) (12/06/23 1100)  Pulse: 93 (12/06/23 1400)  Resp: (!) 28 (12/06/23 0500)  BP: (!) 66/0 (12/06/23 1100)  SpO2: 100 % (12/06/23 1400)  Weight: 92.5 kg (204 lb) (12/06/23 1107)  Height: 6' (182.9 cm) (12/05/23 0723)  Mallampati Scale: Class III  ASA Classification: Class 3    Intubation    Date/Time: 12/6/2023 2:10 PM  Location procedure was performed: [facility] CRITICAL CARE SURGERY    Performed by: Josr Morales MD  Authorized by: Henry Cortez MD  Consent Done: Emergent Situation  Indications: respiratory failure  Intubation method: video-assisted  Patient status: paralyzed (RSI)  Preoxygenation: nasal cannula and blow-by  Sedatives: midazolam  Paralytic: rocuronium  Laryngoscope size: Glide 4  Tube size: 7.5 mm  Tube type: cuffed  Number of attempts: 1  Cords visualized: yes  Post-procedure assessment: chest rise and ETCO2 monitor  Breath sounds: equal and absent over the epigastrium  Cuff inflated: yes  ETT to lip: 24 cm  Tube secured with: ETT carranza  Patient tolerance: Patient tolerated the procedure well with no immediate complications  Complications: No          12/6/2023

## 2023-12-06 NOTE — PROGRESS NOTES
12/05/23 2049   Treatment   Treatment Type SLED   Treatment Status New start   Dialysis Machine Number [id number]   Dialyzer Time (hours) 0   BVP (Liters) 0 L   Solutions Labeled and Current  Yes   Access Temporary Cath;Left;IJ   Catheter Dressing Intact  Yes   Alarms Engaged Yes   CRRT Comments CRRT NST   $ CRRT Charges   $ CRRT Charges Initial Setup     Report received from primary RN. Cathflo aspirated and CRRT started per MD order.

## 2023-12-06 NOTE — PT/OT/SLP PROGRESS
Occupational Therapy      Patient Name:  Radha Abbott   MRN:  [id number]    Patient not seen today secondary to Other (CRRT/pressor support.) Will follow-up tomorrow.    12/6/2023

## 2023-12-06 NOTE — PROGRESS NOTES
[facility]  Endocrinology  Progress Note    Admit Date: 11/9/2023     Reason for Consult: Management of T2DM, Hyperglycemia     Surgical Procedure and Date: n/a    Diabetes diagnosis year: 1998    Home Diabetes Medications:  Metformin (off since October)   Lab Results   Component Value Date    HGBA1C 7.6 (H) 10/12/2023       How often checking glucose at home?  Once daily in the AM    BG readings on regimen: 150-160s  Hypoglycemia on the regimen?  No  Missed doses on regimen?  n/a    Diabetes Complications include:     Hyperglycemia    Complicating diabetes co morbidities:   CAD s/p CABG, HTN, HLD      HPI:   Patient is a 63 y.o. male with a diagnosis of CAD s/p 3v CABG (unclear anatomy, 2009), ICM with a recent EF of 15-20% s/p ICD (medtronik 2009), DM2 (a1c 7.7), HTN, HLD, Vfib on amio who presents to the ED with CC of SOB. In the ED he was AF with stable VS on RA.  CBC showing chronic anemia.  CMP notable for acute on chronic CKD with baseline around 2.1 and Cr now 2.6.  BNP elevated.  Lactate neg.  CXR showing pulmonary edema. He was subsequently admitted to the CCU for diuresis.  Endocrinology consulted for management of T2DM.          Interval HPI:   Of note, patient fell off of transition drip this AM, BG currently at/below goal. Pt intubated today.     Patient in room 69288/05092 A. Blood glucose variable. BG above and below goal on current insulin regimen (Transition Insulin Drip). Steroid use-  None    2 Days Post-Op  Renal function- Abnormal - Cr 1.6    Vasopressors-  Epinephrine     Diet NPO Except for: Medication     Eating:   NPO  Nausea: No  Hypoglycemia and intervention: No  Fever: No  TPN and/or TF: TF initially planned to start today, however delayed due to patient status    BP (!) 82/0 (BP Location: Left arm, Patient Position: Lying)   Pulse 100   Temp 98.6 °F (37 °C) (Core Bladder)   Resp (!) 28   Ht 6' (1.829 m)   Wt 90.3 kg (199 lb)   SpO2 (!) 94%   BMI 26.99  "kg/m²     Labs Reviewed and Include    Recent Labs   Lab 12/06/23  0410   GLU 98  96   CALCIUM 8.3*  8.3*   ALBUMIN 2.2*  2.2*  2.2*   PROT 5.8*  5.9*     140   K 4.2  4.2   CO2 19*  22*     106   BUN 33*  27*   CREATININE 2.0*  1.6*   ALKPHOS 115  121   ALT 17  17   AST 49*  47*   BILITOT 0.9  0.9     Lab Results   Component Value Date    WBC 12.24 12/06/2023    HGB 7.1 (L) 12/06/2023    HCT 21.0 (L) 12/06/2023    MCV 87 12/06/2023     12/06/2023     No results for input(s): "TSH", "FREET4" in the last 168 hours.  Lab Results   Component Value Date    HGBA1C 7.6 (H) 10/12/2023       Nutritional status:   Body mass index is 26.99 kg/m².  Lab Results   Component Value Date    ALBUMIN 2.2 (L) 12/06/2023    ALBUMIN 2.2 (L) 12/06/2023    ALBUMIN 2.2 (L) 12/06/2023     Lab Results   Component Value Date    PREALBUMIN 19 (L) 12/01/2023    PREALBUMIN 27 11/15/2023       Estimated Creatinine Clearance: 53.2 mL/min (A) (based on SCr of 1.6 mg/dL (H)).    Accu-Checks  Recent Labs     12/05/23  0405 12/05/23  0731 12/05/23  1226 12/05/23  1820 12/05/23 2014 12/05/23  2336 12/06/23  0415 12/06/23  0417 12/06/23  0459 12/06/23  0750   POCTGLUCOSE 174* 214* 230* 181* 262* 117* 102 114* 110 228*       Current Medications and/or Treatments Impacting Glycemic Control  Immunotherapy:    Immunosuppressants       None          Steroids:   Hormones (From admission, onward)      None          Pressors:    Autonomic Drugs (From admission, onward)      Start     Stop Route Frequency Ordered    12/06/23 1030  EPINEPHrine 5 mg in dextrose 5% 250 mL infusion (premix)         -- IV Continuous 12/06/23 0926          Hyperglycemia/Diabetes Medications:   Antihyperglycemics (From admission, onward)      Start     Stop Route Frequency Ordered    12/06/23 1101  insulin aspart U-100 pen 0-5 Units         -- SubQ Every 4 hours PRN 12/06/23 1003            ASSESSMENT and PLAN    Cardiac/Vascular  * 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, NYHA class 4  Managed per primary team  Optimize BG control  S/p LVAD     Renal/  Acute renal failure with acute tubular necrosis superimposed on stage 3b chronic kidney disease  Lab Results   Component Value Date    CREATININE 1.6 (H) 12/06/2023    CREATININE 2.0 (H) 12/06/2023     Avoid insulin stacking  Titrate insulin slowly       Endocrine  Type 2 diabetes mellitus without complication, without long-term current use of insulin  BG goal 140-180     - Patient fell off of insulin gtt overnight abd BG stable since- given plan to start TF today, will transition to q4 hour SSI novolog with q4hr POCT glucose checks. Will hold off on starting scheduled novolog until TF start running.    - Novolog (aspart) insulin prn for BG excursions LDC (150/50)   - BG checks q4hr   - Hypoglycemia protocol in place    ** Please notify Endocrine for any change and/or advance in diet**  ** Please call Endocrine for any BG related issues **    Discharge Planning:   TBD. Please notify endocrinology prior to discharge.                Ebony Carlson PA-C  Endocrinology  [facility]

## 2023-12-06 NOTE — ASSESSMENT & PLAN NOTE
-HeartMate 3 Implanted  12/1/2023  as DT  -CTS Primary  -Implanted by Dr. Washington  -HOLD Coumadin,  Goal INR 2.0-3.0 . Subtherapeutic today. On Heparin gtt   -Antiplatelets Not on ASA  -LDH is stable overall today. Will continue to monitor daily.  -Speed set at  4800   rpm, LSL 4400 rpm   -Interrogation notable for no events  -Not listed for OHTx, declined for OHTX due to comorbidities         Procedure: Device Interrogation Including analysis of device parameters  Current Settings: Ventricular Assist Device  Review of device function is stable/unstable stable        12/6/2023    11:00 AM 12/6/2023    10:00 AM 12/6/2023     9:00 AM 12/6/2023     8:00 AM 12/6/2023     7:00 AM 12/6/2023     6:00 AM 12/6/2023     5:00 AM   TXP LVAD INTERROGATIONS   Type HeartMate3 HeartMate3 HeartMate3 HeartMate3 HeartMate3 HeartMate3 HeartMate3   Flow 4.3 4.4 3.8 3.7 3.5 3.6 3.4   Speed 4800 4800 4800 4800 4800 4800 4800   PI 3.5 3.2 4.2 4.5 5.3 4.8 6.2   Power ([place]) 3.1 3.1 3.1 3.1 3.2 3.1 3.1   LSL 4400 4400 4400 4400 4400 4400 4400   Pulsatility Pulse Pulse Pulse Pulse Pulse Pulse Pulse

## 2023-12-06 NOTE — ASSESSMENT & PLAN NOTE
Neuro/Psych:   -- Sedation: none; seroquel 50 Q6 PRN for agitation  -- Pain: d/c all narcotics             Cardiac:   -- S/P redo sternotomy LVAD with Dr. Washington on 12/01, chest closed 12/4  -- MAP Goal: 70-80  -- Cardene PRN  -- Pressors: epi 0.08,  5; vaso 0.04 started today  -- midodrine 10 TID added for hypotension (MAP 50s)  -- Anti-HTNs: none  -- Rhythm: NSR  -- amiodarone 400mg daily  -- LVAD heartmate III: RPM 4800, flow 3.6  -- Beta blocker: Will start when appropriate  -- Statin: Atorvastatin 40 mg QD  -- No ASA at this time  -- heparin 600/hr, PTT goal 35-45  -- coumadin 2mg      Pulmonary:   -- Goal SpO2 >92%  -- Ketty 10  -- Chest Tubes x 2 (2 Meds):   - output 255cc/24hr  -- Q1 ABGs and lactates      Renal:  -- Trend BUN/Cr   -- Maintain Hampton, record strict Is/Os  -- CRRT started last night, SLED 12hrs  -- net +1.47L/24 hrs  -- goal to be net neg 1.5-2L/24hr      Recent Labs   Lab 12/05/23  0745 12/05/23  2330 12/06/23  0410   BUN 72* 45* 33*  27*   CREATININE 3.5* 2.3* 2.0*  1.6*           FEN / GI:   -- Daily CMP, PRN K/Mag/Phos per protocol   -- Replace electrolytes as needed  -- Nutrition: starting trickle feeds  -- Bowel Regimen: Miralax, docusate      ID:   -- Afebrile  -- WBC stable  -- Abx: cefepime. NO vanc  -- vanc level 7.8    Recent Labs   Lab 12/05/23  0406 12/05/23  0745 12/06/23  0410   WBC 13.99* 13.35* 12.35           Heme/Onc:   -- Hgb 8.7 pre-operatively  -- received 7 RBC, 2 FFP, 1 platelet, 10 cry intra-op  -- 12/1: 1 RBC, 4 FFP, 2 plts, 1 cryo  -- q4 CBC and coags  -- q4 fibrinogen - keep fibrinogen >300  -- holding ASA    Recent Labs   Lab 12/05/23  0406 12/05/23  0745 12/05/23  1224 12/05/23  1821 12/05/23  2330 12/06/23  0410   HGB 7.4* 7.3*  --   --   --  5.2*    199  --   --   --  236   APTT 26.4  --    < > 29.6 27.2 28.5   INR 1.1  --    < > 1.1 1.1 1.1    < > = values in this interval not displayed.           Endocrine:   -- CTS Goal -140  -- HgbA1c:  7.6  -- Endocrinology consulted for insulin management      PPx:   Feeding: NPO  Analgesia/Sedation: fent, dilaudid / Prop  Thromboembolic Prevention: SCDs  HOB >30: Yes  Stress Ulcer: pantoprazole   Glucose Control: Yes, insulin management per Endocrinology  Bowel reg:   Invasive Lines/Drains/Airway:   ETT  OGT  Left radial arterial line  RIJ mac w/ slick + swan   LIJ quad CVC  [place]pton-d/c today  Chest Tubes: 2 Mediastinal  LVAD     Dispo/Code Status/Palliative:     - Continue SICU Care    - Full Code

## 2023-12-06 NOTE — ASSESSMENT & PLAN NOTE
-multifactorial  -likely metabolic encephalopathy +/- icu delirium   -Continue RRT for metabolic clearance  -provide frequent re-orientation  -Promote sleep/wake cycle   -supportive care

## 2023-12-06 NOTE — PROCEDURES
Radha Abbott is a 63 y.o. male patient.    Temp: 97.6 °F (36.4 °C) (12/06/23 1100)  Pulse: 92 (12/06/23 1522)  Resp: (!) 28 (12/06/23 0500)  BP: (!) 66/0 (12/06/23 1100)  SpO2: 96 % (12/06/23 1522)  Weight: 92.5 kg (204 lb) (12/06/23 1107)  Height: 6' (182.9 cm) (12/05/23 0723)  Mallampati Scale: Class III  ASA Classification: Class 3    Arterial Line    Date/Time: 12/6/2023 3:47 PM  Location procedure was performed: [facility] CRITICAL CARE SURGERY    Performed by: Josr Morales MD  Authorized by: Josr Morales MD  Preparation: Patient was prepped and draped in the usual sterile fashion.  Indications: multiple ABGs, respiratory failure and hemodynamic monitoring  Location: left radial    Patient sedated: yes  Sedation type: deep sedation    Sedatives: see MAR for details  Vitals: Vital signs were monitored during sedation.  Needle gauge: 4F ministick catheter.  Seldinger technique: Seldinger technique used  Complications: No  Estimated blood loss (mL): 2  Post-procedure: line sutured and dressing applied  Patient tolerance: Patient tolerated the procedure well with no immediate complications          12/6/2023

## 2023-12-06 NOTE — EICU
Intervention Initiated From:  Bedside    Theo intervened regarding:  Documentation    Comments: Called to bedside for intubation.  Pt with increased work of breathing noted. SaO2 82%.  Time out completed using proper pt identifiers.    1215 - epi 20 mcgs given per MD order  1217 - epi 20 mcgs given IVP  1217 - vaso 0.4 units given IVP  1219 - vaso 0.4 units given IVP  1219 - versed 4 mg given IVP  1219 - rocuronium 100 mg given IVP  1220 - Intubated by MD Morales using glidescope.  ETT #7.5 placed.  Color change noted to ETCO2 detector.  Bilat breath sounds auscultated.  ETT secured at 24 cm to lips.    1221 - epi 20 mcg given IVP  1221 - vaso 0.4 units given IVP  PCXR ordered.

## 2023-12-06 NOTE — SUBJECTIVE & OBJECTIVE
Interval History/Significant Events: CRRT started overnight, SLED. Confusion unchanged after addition of seroquel 50 Q6.     Follow-up For: Procedure(s) (LRB):  CLOSURE, WOUND, STERNUM (N/A)  INSERTION, GRAFT, PERICARDIUM  DRAINAGE, PLEURAL EFFUSION    Post-Operative Day: 2 Days Post-Op    Objective:     Vital Signs (Most Recent):  Temp: 98.6 °F (37 °C) (12/06/23 0515)  Pulse: 98 (12/06/23 0515)  Resp: (!) 28 (12/06/23 0500)  BP: (!) 80/0 (12/06/23 0200)  SpO2: 100 % (12/06/23 0515) Vital Signs (24h Range):  Temp:  [97.9 °F (36.6 °C)-99.3 °F (37.4 °C)] 98.6 °F (37 °C)  Pulse:  [] 98  Resp:  [22-42] 28  SpO2:  [90 %-100 %] 100 %  BP: (72-82)/(0) 80/0  Arterial Line BP: ()/(6-147) 91/68     Weight: 90.3 kg (199 lb)  Body mass index is 26.99 kg/m².      Intake/Output Summary (Last 24 hours) at 12/6/2023 0529  Last data filed at 12/6/2023 0500  Gross per 24 hour   Intake 2804.4 ml   Output 2750 ml   Net 54.4 ml          Physical Exam  Vitals and nursing note reviewed.   Constitutional:       General: He is not in acute distress.     Appearance: Normal appearance.   HENT:      Head: Normocephalic and atraumatic.      Mouth/Throat:      Mouth: Mucous membranes are moist.      Pharynx: Oropharynx is clear.   Cardiovascular:      Rate and Rhythm: Normal rate and regular rhythm.      Comments: LVAD in place  Pulmonary:      Effort: Pulmonary effort is normal. No respiratory distress.      Breath sounds: Normal breath sounds.   Abdominal:      General: Abdomen is flat. There is no distension.      Palpations: Abdomen is soft.      Tenderness: There is no abdominal tenderness.      Comments: NGT in place   Genitourinary:     Comments: goodman  Musculoskeletal:         General: Normal range of motion.      Right lower leg: No edema.      Left lower leg: No edema.   Skin:     General: Skin is warm and dry.            Vents:  Vent Mode: Spont (12/05/23 0930)  Ventilator Initiated: Yes (12/01/23 1529)  Set Rate: 16 BPM  (12/05/23 0544)  Vt Set: 490 mL (12/05/23 0544)  Pressure Support: 5 cmH20 (12/05/23 0930)  PEEP/CPAP: 5 cmH20 (12/05/23 0930)  Oxygen Concentration (%): 40 (12/05/23 0930)  Peak Airway Pressure: 11 cmH20 (12/05/23 0930)  Plateau Pressure: 21 cmH20 (12/05/23 0930)  Total Ve: 13.8 L/m (12/05/23 0930)  Negative Inspiratory Force (cm H2O): -22 (12/05/23 0930)  F/VT Ratio<105 (RSBI): (!) 39.01 (12/04/23 1803)    Lines/Drains/Airways       Peripherally Inserted Central Catheter Line  Duration             PICC Triple Lumen 12/05/23 1208 right basilic <1 day              Central Venous Catheter Line  Duration             Trialysis (Dialysis) Catheter 12/01/23 1530 left internal jugular 4 days              Drain  Duration                  Chest Tube Tube - 1 Right Mediastinal -- days         Chest Tube 12/01/23 Tube - 2 Left Mediastinal 5 days         Urethral Catheter 12/01/23 0754 Temperature probe;Silicone;Non-latex 14 Fr. 4 days         NG/OG Tube 12/04/23 1730 Left nostril 1 day              Arterial Line  Duration             Arterial Line 12/01/23 1500 Left Radial 4 days              Line  Duration                  VAD 12/01/23 1015 Left ventricular assist device HeartMate 3 4 days              Peripheral Intravenous Line  Duration                  Peripheral IV - Single Lumen 12/04/23 20 G Anterior;Right Forearm 2 days                    Significant Labs:    CBC/Anemia Profile:  Recent Labs   Lab 12/05/23  0008 12/05/23  0143 12/05/23  0406 12/05/23  0450 12/05/23  0745 12/05/23  0914 12/05/23  1041 12/05/23  1232   WBC 14.73*  --  13.99*  --  13.35*  --   --   --    HGB 8.1*  --  7.4*  --  7.3*  --   --   --    HCT 23.4*   < > 21.5*   < > 21.2* 22* 23* 30*     --  181  --  199  --   --   --    MCV 85  --  86  --  86  --   --   --    RDW 15.6*  --  15.4*  --  15.3*  --   --   --     < > = values in this interval not displayed.        Chemistries:  Recent Labs   Lab 12/05/23  0406 12/05/23  0745 12/05/23  1823  12/05/23  2330 12/06/23  0410    136  --  140 140  140   K 3.9 4.0 4.0 4.1 4.2  4.2    102  --  106 106  106   CO2 18* 17*  --  19* 19*  22*   BUN 69* 72*  --  45* 33*  27*   CREATININE 3.3* 3.5*  --  2.3* 2.0*  1.6*   CALCIUM 8.4* 8.5*  --  8.2* 8.3*  8.3*   ALBUMIN 2.5* 2.5*  --  2.3* 2.2*  2.2*  2.2*   PROT 6.0 6.0  --   --  5.8*  5.9*   BILITOT 1.1* 0.9  --   --  0.9  0.9   ALKPHOS 87 90  --   --  115  121   ALT 24 24  --   --  17  17   AST 37 38  --   --  49*  47*   MG 2.4 2.4 2.6 2.3 2.1  2.1   PHOS 6.9* 7.3*  --  3.8 2.8  2.2*       ABGs:   Recent Labs   Lab 12/05/23 2055   PH 7.445   PCO2 27.5*   HCO3 18.9*   POCSATURATED 93   BE -5*       Significant Imaging:  I have reviewed all pertinent imaging results/findings within the past 24 hours.

## 2023-12-06 NOTE — ASSESSMENT & PLAN NOTE
- suspect acute tubular injury secondary to hypotension/cardiogenic shock likely compounded by intra-arterial contrast and anemia with urinary sediment with granular casts  - will proceed with CRRT/Prismax at 25 mL/kg/hr with Phoxillium BK4/2.5 for metabolic clearance and volume management (goal to keep net negative ~1 liter in the next 24 hours), consent obtained via wife at bedside  - Q8H RFPs and magnesium levels per CRRT protocol   - renal diet/tube feeds when not NPO, volume restriction per primary team  - strict I/O's and daily weights  - keep MAP > 65 mmHg  - renally all dose medications to eGFR  - avoid nephrotoxic agents wean feasible (i.e. NSAIDs, intra-arterial contrast, supra-therapeutic vancomycin levels, etc.)

## 2023-12-06 NOTE — ASSESSMENT & PLAN NOTE
Lab Results   Component Value Date    CREATININE 1.6 (H) 12/06/2023    CREATININE 2.0 (H) 12/06/2023     Avoid insulin stacking  Titrate insulin slowly

## 2023-12-06 NOTE — PROGRESS NOTES
12/06/23 1250 12/06/23 1300 12/06/23 1305   Treatment   Treatment Type SLED  --  Other  (CVVHDF)   Treatment Status Blood returned;Order change;Equipment change  --  Restart;Order change;Equipment change   Dialysis Machine Number [id number]  --  PM11   Dialyzer Time (hours) 10.52  --  0   BVP (Liters) 114.1 L  --  0 L   Solutions Labeled and Current   --   --  Yes   Access  --   --  Temporary Cath;Left;IJ   Catheter Dressing Intact   --   --  Yes   Alarms Engaged  --   --  Yes   CRRT Comments orders changed- switching to prismax. blood returned.  --  crrt restarted as cvvhdf as ordered   Prescription   Time (Hours)  --   --  Continuous   Dialysate K + (mEq/L)  --   --  4   Dialysate CA + (mEq/L)  --   --  2.5   Cartridge Type  --   --  Other  ([id number])   CRRT Sodium Chloride   CRRT Sodium Chloride Volume  --  0 mL  --    CRRT Hourly Documentation   Blood Flow (mL/min)  --   --  200   Total UF (Hourly Cleared) (mL) 186  --   --    CRRT Prismaflex Hourly Documentation   Access Pressure (mmHg)  --   --  -55 mmHg   Filter Pressure (mmHg)  --   --  136 mmHg   Transmembrane Pressure (mmHg)  --   --  49 mmHg   Pressure Drop (mmHg)  --   --  50 mmHg   Return Pressure (mmHg)  --   --  51 mmHg   Effluent Pressure (mmHg)  --   --  39 mmHg   Net Fluid Removal (Hourly)(mL)  --   --  0   Pre Replacement Fluid Rate (mL/hour)  --   --  1200 mL/hour   Post Replacement Fluid Rate (mL/hour)  --   --  200 mL/hour   Deaeration Chamber Level Adjusted?  --   --  Yes   Fluid Bag/Syringe Change  --   --  Completed     Orders changed from SLED to CVVHDF. Blood returned. Machines swapped. [facility] CVC aspirated, flushed, and accessed using aseptic technique. Lines connected and secured.

## 2023-12-06 NOTE — PROGRESS NOTES
[facility] - Surgical Intensive Care  Critical Care - Surgery  Progress Note    Patient Name: Radha Abbott  MRN: [id number]  Admission Date: 11/9/2023  Hospital Length of Stay: 27 days  Code Status: Full Code  Attending Provider: Yuri Washington MD  Primary Care Provider: Vasu Kong MD   Principal Problem: Acute on chronic combined systolic and diastolic CHF, NYHA class 4    Subjective:     Hospital/ICU Course:  No notes on file    Interval History/Significant Events: CRRT started overnight, SLED. Confusion unchanged after addition of seroquel 50 Q6.     Follow-up For: Procedure(s) (LRB):  CLOSURE, WOUND, STERNUM (N/A)  INSERTION, GRAFT, PERICARDIUM  DRAINAGE, PLEURAL EFFUSION    Post-Operative Day: 2 Days Post-Op    Objective:     Vital Signs (Most Recent):  Temp: 98.6 °F (37 °C) (12/06/23 0515)  Pulse: 98 (12/06/23 0515)  Resp: (!) 28 (12/06/23 0500)  BP: (!) 80/0 (12/06/23 0200)  SpO2: 100 % (12/06/23 0515) Vital Signs (24h Range):  Temp:  [97.9 °F (36.6 °C)-99.3 °F (37.4 °C)] 98.6 °F (37 °C)  Pulse:  [] 98  Resp:  [22-42] 28  SpO2:  [90 %-100 %] 100 %  BP: (72-82)/(0) 80/0  Arterial Line BP: ()/(6-147) 91/68     Weight: 90.3 kg (199 lb)  Body mass index is 26.99 kg/m².      Intake/Output Summary (Last 24 hours) at 12/6/2023 0529  Last data filed at 12/6/2023 0500  Gross per 24 hour   Intake 2804.4 ml   Output 2750 ml   Net 54.4 ml          Physical Exam  Vitals and nursing note reviewed.   Constitutional:       General: He is not in acute distress.     Appearance: Normal appearance.   HENT:      Head: Normocephalic and atraumatic.      Mouth/Throat:      Mouth: Mucous membranes are moist.      Pharynx: Oropharynx is clear.   Cardiovascular:      Rate and Rhythm: Normal rate and regular rhythm.      Comments: LVAD in place  Pulmonary:      Effort: Pulmonary effort is normal. No respiratory distress.      Breath sounds: Normal breath sounds.   Abdominal:      General: Abdomen is flat. There is no  distension.      Palpations: Abdomen is soft.      Tenderness: There is no abdominal tenderness.      Comments: NGT in place   Genitourinary:     Comments: goodman  Musculoskeletal:         General: Normal range of motion.      Right lower leg: No edema.      Left lower leg: No edema.   Skin:     General: Skin is warm and dry.            Vents:  Vent Mode: Spont (12/05/23 0930)  Ventilator Initiated: Yes (12/01/23 1529)  Set Rate: 16 BPM (12/05/23 0544)  Vt Set: 490 mL (12/05/23 0544)  Pressure Support: 5 cmH20 (12/05/23 0930)  PEEP/CPAP: 5 cmH20 (12/05/23 0930)  Oxygen Concentration (%): 40 (12/05/23 0930)  Peak Airway Pressure: 11 cmH20 (12/05/23 0930)  Plateau Pressure: 21 cmH20 (12/05/23 0930)  Total Ve: 13.8 L/m (12/05/23 0930)  Negative Inspiratory Force (cm H2O): -22 (12/05/23 0930)  F/VT Ratio<105 (RSBI): (!) 39.01 (12/04/23 1803)    Lines/Drains/Airways       Peripherally Inserted Central Catheter Line  Duration             PICC Triple Lumen 12/05/23 1208 right basilic <1 day              Central Venous Catheter Line  Duration             Trialysis (Dialysis) Catheter 12/01/23 1530 left internal jugular 4 days              Drain  Duration                  Chest Tube Tube - 1 Right Mediastinal -- days         Chest Tube 12/01/23 Tube - 2 Left Mediastinal 5 days         Urethral Catheter 12/01/23 0754 Temperature probe;Silicone;Non-latex 14 Fr. 4 days         NG/OG Tube 12/04/23 1730 Left nostril 1 day              Arterial Line  Duration             Arterial Line 12/01/23 1500 Left Radial 4 days              Line  Duration                  VAD 12/01/23 1015 Left ventricular assist device HeartMate 3 4 days              Peripheral Intravenous Line  Duration                  Peripheral IV - Single Lumen 12/04/23 20 G Anterior;Right Forearm 2 days                    Significant Labs:    CBC/Anemia Profile:  Recent Labs   Lab 12/05/23  0008 12/05/23  0143 12/05/23  0406 12/05/23  0450 12/05/23  0745 12/05/23  0914  12/05/23  1041 12/05/23  1232   WBC 14.73*  --  13.99*  --  13.35*  --   --   --    HGB 8.1*  --  7.4*  --  7.3*  --   --   --    HCT 23.4*   < > 21.5*   < > 21.2* 22* 23* 30*     --  181  --  199  --   --   --    MCV 85  --  86  --  86  --   --   --    RDW 15.6*  --  15.4*  --  15.3*  --   --   --     < > = values in this interval not displayed.        Chemistries:  Recent Labs   Lab 12/05/23  0406 12/05/23  0745 12/05/23  1821 12/05/23  2330 12/06/23  0410    136  --  140 140  140   K 3.9 4.0 4.0 4.1 4.2  4.2    102  --  106 106  106   CO2 18* 17*  --  19* 19*  22*   BUN 69* 72*  --  45* 33*  27*   CREATININE 3.3* 3.5*  --  2.3* 2.0*  1.6*   CALCIUM 8.4* 8.5*  --  8.2* 8.3*  8.3*   ALBUMIN 2.5* 2.5*  --  2.3* 2.2*  2.2*  2.2*   PROT 6.0 6.0  --   --  5.8*  5.9*   BILITOT 1.1* 0.9  --   --  0.9  0.9   ALKPHOS 87 90  --   --  115  121   ALT 24 24  --   --  17  17   AST 37 38  --   --  49*  47*   MG 2.4 2.4 2.6 2.3 2.1  2.1   PHOS 6.9* 7.3*  --  3.8 2.8  2.2*       ABGs:   Recent Labs   Lab 12/05/23 2055   PH 7.445   PCO2 27.5*   HCO3 18.9*   POCSATURATED 93   BE -5*       Significant Imaging:  I have reviewed all pertinent imaging results/findings within the past 24 hours.  Assessment/Plan:     Cardiac/Vascular  * Acute on chronic combined systolic and diastolic CHF, NYHA class 4    Neuro/Psych:   -- Sedation: none; seroquel 50 Q6 PRN for agitation  -- Pain: d/c all narcotics             Cardiac:   -- S/P redo sternotomy LVAD with Dr. Washington on 12/01, chest closed 12/4  -- MAP Goal: 70-80  -- Cardene PRN  -- Pressors: epi 0.08,  5; vaso 0.04 started today  -- midodrine 10 TID added for hypotension (MAP 50s)  -- Anti-HTNs: none  -- Rhythm: NSR  -- amiodarone 400mg daily  -- LVAD heartmate III: RPM 4800, flow 3.6  -- Beta blocker: Will start when appropriate  -- Statin: Atorvastatin 40 mg QD  -- No ASA at this time  -- heparin 600/hr, PTT goal 35-45  -- coumadin 2mg       Pulmonary:   -- Goal SpO2 >92%  -- Ketty 10  -- Chest Tubes x 2 (2 Meds):   - output 255cc/24hr  -- Q1 ABGs and lactates      Renal:  -- Trend BUN/Cr   -- Maintain Hampton, record strict Is/Os  -- CRRT started last night, SLED 12hrs  -- net +1.47L/24 hrs  -- goal to be net neg 1.5-2L/24hr      Recent Labs   Lab 12/05/23  0745 12/05/23  2330 12/06/23  0410   BUN 72* 45* 33*  27*   CREATININE 3.5* 2.3* 2.0*  1.6*           FEN / GI:   -- Daily CMP, PRN K/Mag/Phos per protocol   -- Replace electrolytes as needed  -- Nutrition: starting trickle feeds  -- Bowel Regimen: Miralax, docusate      ID:   -- Afebrile  -- WBC stable  -- Abx: cefepime. NO vanc  -- vanc level 7.8    Recent Labs   Lab 12/05/23  0406 12/05/23  0745 12/06/23  0410   WBC 13.99* 13.35* 12.35           Heme/Onc:   -- Hgb 8.7 pre-operatively  -- received 7 RBC, 2 FFP, 1 platelet, 10 cry intra-op  -- 12/1: 1 RBC, 4 FFP, 2 plts, 1 cryo  -- q4 CBC and coags  -- q4 fibrinogen - keep fibrinogen >300  -- holding ASA    Recent Labs   Lab 12/05/23  0406 12/05/23  0745 12/05/23  1224 12/05/23  1821 12/05/23  2330 12/06/23  0410   HGB 7.4* 7.3*  --   --   --  5.2*    199  --   --   --  236   APTT 26.4  --    < > 29.6 27.2 28.5   INR 1.1  --    < > 1.1 1.1 1.1    < > = values in this interval not displayed.           Endocrine:   -- CTS Goal -140  -- HgbA1c: 7.6  -- Endocrinology consulted for insulin management      PPx:   Feeding: NPO  Analgesia/Sedation: fent, dilaudid / Prop  Thromboembolic Prevention: SCDs  HOB >30: Yes  Stress Ulcer: pantoprazole   Glucose Control: Yes, insulin management per Endocrinology  Bowel reg:   Invasive Lines/Drains/Airway:   ETT  OGT  Left radial arterial line  RIJ mac w/ slick + jeanna ELLIOTT quad CVC  Hampton-d/c today  Chest Tubes: 2 Mediastinal  LVAD     Dispo/Code Status/Palliative:     - Continue SICU Care    - Full Code           Melanie Padilla MD  Critical Care - Surgery  [facility] - Surgical Intensive Care

## 2023-12-06 NOTE — PROGRESS NOTES
12/06/2023  Deni Frye    Current provider:  Yuri Washington MD    Device interrogation:      12/6/2023    10:00 AM 12/6/2023     9:00 AM 12/6/2023     8:00 AM 12/6/2023     7:00 AM 12/6/2023     6:00 AM 12/6/2023     5:00 AM 12/6/2023     4:00 AM   TXP LVAD INTERROGATIONS   Type HeartMate3 HeartMate3 HeartMate3 HeartMate3 HeartMate3 HeartMate3 HeartMate3   Flow 4.4 3.8 3.7 3.5 3.6 3.4 3.4   Speed 4800 4800 4800 4800 4800 4800 4800   PI 3.2 4.2 4.5 5.3 4.8 6.2 5.7   Power ([place]) 3.1 3.1 3.1 3.2 3.1 3.1 3.1   LSL 4400 4400 4400 4400 4400 4400 4400   Pulsatility Pulse Pulse Pulse Pulse Pulse Pulse Pulse          Rounded on [facility] to ensure all mechanical assist device settings (IABP or VAD) were appropriate and all parameters were within limits.  I was able to ensure all back up equipment was present, the staff had no issues, and the Perfusion Department daily rounding was complete.      For implantable VADs: Interrogation of Ventricular assist device was performed with analysis of device parameters and review of device function. I have personally reviewed the interrogation findings and agree with findings as stated.     In emergency, the nursing units have been notified to contact the perfusion department either by:  Calling [phone] from 630am to 4pm Mon thru Fri, utilizing the On-Call Finder functionality of Epic and searching for Perfusion, or by contacting the hospital  from 4pm to 630am and on weekends and asking to speak with the perfusionist on call.    10:31 AM

## 2023-12-06 NOTE — NURSING
Dr. Hunt at bedside to assess patient. Sats on monitor 94%. Orders received for another ABG. REfer to flowsheet for results.

## 2023-12-06 NOTE — ANESTHESIA POSTPROCEDURE EVALUATION
Anesthesia Post Evaluation    Patient: Radha Abbott    Procedure(s) Performed: Procedure(s) (LRB):  CLOSURE, WOUND, STERNUM (N/A)  INSERTION, GRAFT, PERICARDIUM  DRAINAGE, PLEURAL EFFUSION    Final Anesthesia Type: general      Patient location during evaluation: ICU  Patient participation: No - Unable to Participate, Intubation  Level of consciousness: sedated and confused  Post-procedure vital signs: reviewed and not stable (remains hypotensive on pressors)  Pain management: adequate  Airway patency: patent    PONV status at discharge: No PONV  Anesthetic complications: no      Cardiovascular status: hypotensive  Respiratory status: intubated and ventilator  Hydration status: euvolemic  Follow-up not needed.  Comments: Pt extubated, but required re-intubation today for respiratory distress              Vitals Value Taken Time   BP 66/0 12/06/23 1100   Temp 36.4 °C (97.6 °F) 12/06/23 1100   Pulse 88 12/06/23 1602   Resp 28 12/06/23 0500   SpO2 100 % 12/06/23 1602   Vitals shown include unvalidated device data.      No case tracking events are documented in the log.      Pain/Blayne Score: Pain Rating Prior to Med Admin: 5 (12/6/2023  1:49 AM)  Pain Rating Post Med Admin: 0 (12/6/2023  2:42 AM)

## 2023-12-06 NOTE — PLAN OF CARE
SICU PLAN OF CARE NOTE    Dx: Acute on chronic combined systolic and diastolic CHF, NYHA class 4    Shift Events: Remains restless and does not communicate this AM, PRN Seroquel and Oxycodone given overnight for agitation, CRRT SLED for 8 hrs thus far, clotted off once.    Goals of Care: Continue CRRT, monitor electrolytes and neuro status    Neuro: Arouses to Voice, Moves All Extremities, and Confused    Vital Signs: BP (!) 80/0 (BP Location: Left arm, Patient Position: Lying)   Pulse 98   Temp 98.6 °F (37 °C)   Resp (!) 28   Ht 6' (1.829 m)   Wt 90.3 kg (199 lb)   SpO2 100%   BMI 26.99 kg/m²     Respiratory: Nasal Cannula w/ Ketty, 10L O2 and NO @10 PPM    Diet: NPO    Gtts: Epinephrine, Dobutamine, and Heparin    Urine Output: Urinary Catheter 118 cc/shift    Drains: Chest Tube, total output 120 cc /  shift    VAD HM3 speed 4800, LSL 4400, flow 3.5-3.7, PI 5.1-6.2, power 3.1-3.2    Restraints Pt and spouse educated on the importance of restraints to prevent pt from removing cental line, NGT and n.c., no injury noted.    Labs/Accuchecks: All labs reviewed, STAT CBC redraw sent this AM.    Skin: No skin breakdown noted, foam on sacrum and heels, repositions self frequently, on Immerse bed.

## 2023-12-06 NOTE — PT/OT/SLP PROGRESS
Physical Therapy      Patient Name:  Radha Abbott   MRN:  [id number]    Patient not seen today. RN hold. Pt is not medically stable enough for therapy due to being on CRRT with pressor support.  Will follow-up when appropriate.

## 2023-12-06 NOTE — ASSESSMENT & PLAN NOTE
IVÁN on CKD2. Baseline Cr of 1.7-2.0.   - Hold ARNi, entresto  -Trend BMPs daily   -SrCr 2.0 today  -SCUF/SLED for volume removal   -Nephrology was consulted

## 2023-12-06 NOTE — EICU
Intervention Initiated From:  Bedside    Theo intervened regarding:  Documentation and Time-Out    Nurse Notified:  Yes    Doctor Notified:  Yes    Comments: Called to pt's room for time out and documentation prior to bronch. See bedside procedural flow sheet for details. Dr. KEARA Morales at bedside to perform. Pt ventilated and sedated prior to procedure.   1459-start  1503- stop time   Yes - the patient is able to be screened

## 2023-12-06 NOTE — SUBJECTIVE & OBJECTIVE
"Interval HPI:   No acute events overnight. Patient in room 77901/11741 A. Blood glucose variable. BG above and below goal on current insulin regimen (Transition Insulin Drip). Steroid use-  None    2 Days Post-Op  Renal function- Abnormal - Cr 1.6    Vasopressors-  Epinephrine     Diet NPO Except for: Medication     Eating:   NPO  Nausea: No  Hypoglycemia and intervention: No  Fever: No  TPN and/or TF: Plan to start TF today     BP (!) 82/0 (BP Location: Left arm, Patient Position: Lying)   Pulse 100   Temp 98.6 °F (37 °C) (Core Bladder)   Resp (!) 28   Ht 6' (1.829 m)   Wt 90.3 kg (199 lb)   SpO2 (!) 94%   BMI 26.99 kg/m²     Labs Reviewed and Include    Recent Labs   Lab 12/06/23  0410   GLU 98  96   CALCIUM 8.3*  8.3*   ALBUMIN 2.2*  2.2*  2.2*   PROT 5.8*  5.9*     140   K 4.2  4.2   CO2 19*  22*     106   BUN 33*  27*   CREATININE 2.0*  1.6*   ALKPHOS 115  121   ALT 17  17   AST 49*  47*   BILITOT 0.9  0.9     Lab Results   Component Value Date    WBC 12.24 12/06/2023    HGB 7.1 (L) 12/06/2023    HCT 21.0 (L) 12/06/2023    MCV 87 12/06/2023     12/06/2023     No results for input(s): "TSH", "FREET4" in the last 168 hours.  Lab Results   Component Value Date    HGBA1C 7.6 (H) 10/12/2023       Nutritional status:   Body mass index is 26.99 kg/m².  Lab Results   Component Value Date    ALBUMIN 2.2 (L) 12/06/2023    ALBUMIN 2.2 (L) 12/06/2023    ALBUMIN 2.2 (L) 12/06/2023     Lab Results   Component Value Date    PREALBUMIN 19 (L) 12/01/2023    PREALBUMIN 27 11/15/2023       Estimated Creatinine Clearance: 53.2 mL/min (A) (based on SCr of 1.6 mg/dL (H)).    Accu-Checks  Recent Labs     12/05/23  0405 12/05/23  0731 12/05/23  1226 12/05/23  1820 12/05/23  2014 12/05/23  2336 12/06/23  0415 12/06/23  0417 12/06/23  0459 12/06/23  0750   POCTGLUCOSE 174* 214* 230* 181* 262* 117* 102 114* 110 228*       Current Medications and/or Treatments Impacting Glycemic " Control  Immunotherapy:    Immunosuppressants       None          Steroids:   Hormones (From admission, onward)      None          Pressors:    Autonomic Drugs (From admission, onward)      Start     Stop Route Frequency Ordered    12/06/23 1030  EPINEPHrine 5 mg in dextrose 5% 250 mL infusion (premix)         -- IV Continuous 12/06/23 0926          Hyperglycemia/Diabetes Medications:   Antihyperglycemics (From admission, onward)      Start     Stop Route Frequency Ordered    12/06/23 1101  insulin aspart U-100 pen 0-5 Units         -- SubQ Every 4 hours PRN 12/06/23 1003

## 2023-12-07 PROBLEM — R93.89 ABNORMAL CXR: Status: ACTIVE | Noted: 2023-12-07

## 2023-12-07 PROBLEM — G93.40 ACUTE ENCEPHALOPATHY: Status: ACTIVE | Noted: 2023-12-07

## 2023-12-07 LAB
ALBUMIN SERPL BCP-MCNC: 1.9 G/DL (ref 3.5–5.2)
ALBUMIN SERPL BCP-MCNC: 2 G/DL (ref 3.5–5.2)
ALBUMIN SERPL BCP-MCNC: 2.2 G/DL (ref 3.5–5.2)
ALLENS TEST: ABNORMAL
ALLENS TEST: NORMAL
ALLENS TEST: NORMAL
ALP SERPL-CCNC: 129 U/L (ref 55–135)
ALT SERPL W/O P-5'-P-CCNC: 33 U/L (ref 10–44)
ANION GAP SERPL CALC-SCNC: 12 MMOL/L (ref 8–16)
ANION GAP SERPL CALC-SCNC: 19 MMOL/L (ref 8–16)
ANION GAP SERPL CALC-SCNC: 19 MMOL/L (ref 8–16)
APTT PPP: 27.7 SEC (ref 21–32)
APTT PPP: 32.2 SEC (ref 21–32)
APTT PPP: 33.6 SEC (ref 21–32)
APTT PPP: 33.8 SEC (ref 21–32)
APTT PPP: 34.2 SEC (ref 21–32)
AST SERPL-CCNC: 79 U/L (ref 10–40)
BASOPHILS # BLD AUTO: 0.04 K/UL (ref 0–0.2)
BASOPHILS # BLD AUTO: 0.04 K/UL (ref 0–0.2)
BASOPHILS NFR BLD: 0.3 % (ref 0–1.9)
BASOPHILS NFR BLD: 0.4 % (ref 0–1.9)
BILIRUB SERPL-MCNC: 0.7 MG/DL (ref 0.1–1)
BIPAP: 0
BUN SERPL-MCNC: 19 MG/DL (ref 8–23)
BUN SERPL-MCNC: 22 MG/DL (ref 8–23)
BUN SERPL-MCNC: 24 MG/DL (ref 8–23)
CA-I BLDV-SCNC: 0.95 MMOL/L (ref 1.06–1.42)
CA-I BLDV-SCNC: 1.04 MMOL/L (ref 1.06–1.42)
CA-I BLDV-SCNC: 1.06 MMOL/L (ref 1.06–1.42)
CALCIUM SERPL-MCNC: 7.9 MG/DL (ref 8.7–10.5)
CALCIUM SERPL-MCNC: 8.2 MG/DL (ref 8.7–10.5)
CALCIUM SERPL-MCNC: 8.4 MG/DL (ref 8.7–10.5)
CHLORIDE SERPL-SCNC: 105 MMOL/L (ref 95–110)
CO2 SERPL-SCNC: 15 MMOL/L (ref 23–29)
CO2 SERPL-SCNC: 16 MMOL/L (ref 23–29)
CO2 SERPL-SCNC: 22 MMOL/L (ref 23–29)
CREAT SERPL-MCNC: 1.4 MG/DL (ref 0.5–1.4)
CREAT SERPL-MCNC: 1.5 MG/DL (ref 0.5–1.4)
CREAT SERPL-MCNC: 1.6 MG/DL (ref 0.5–1.4)
DELSYS: ABNORMAL
DELSYS: NORMAL
DIFFERENTIAL METHOD BLD: ABNORMAL
DIFFERENTIAL METHOD BLD: ABNORMAL
EOSINOPHIL # BLD AUTO: 0 K/UL (ref 0–0.5)
EOSINOPHIL # BLD AUTO: 0.1 K/UL (ref 0–0.5)
EOSINOPHIL NFR BLD: 0.4 % (ref 0–8)
EOSINOPHIL NFR BLD: 0.5 % (ref 0–8)
ERYTHROCYTE [DISTWIDTH] IN BLOOD BY AUTOMATED COUNT: 16 % (ref 11.5–14.5)
ERYTHROCYTE [DISTWIDTH] IN BLOOD BY AUTOMATED COUNT: 16.1 % (ref 11.5–14.5)
ERYTHROCYTE [SEDIMENTATION RATE] IN BLOOD BY WESTERGREN METHOD: 18 MM/H
EST. GFR  (NO RACE VARIABLE): 48.7 ML/MIN/1.73 M^2
EST. GFR  (NO RACE VARIABLE): 52.6 ML/MIN/1.73 M^2
EST. GFR  (NO RACE VARIABLE): 57.2 ML/MIN/1.73 M^2
FIBRINOGEN PPP-MCNC: 657 MG/DL (ref 182–400)
FIBRINOGEN PPP-MCNC: 678 MG/DL (ref 182–400)
FIBRINOGEN PPP-MCNC: 706 MG/DL (ref 182–400)
FIBRINOGEN PPP-MCNC: 712 MG/DL (ref 182–400)
FIO2: 50
FIO2: 50 %
GLUCOSE SERPL-MCNC: 123 MG/DL (ref 70–110)
GLUCOSE SERPL-MCNC: 128 MG/DL (ref 70–110)
GLUCOSE SERPL-MCNC: 144 MG/DL (ref 70–110)
GLUCOSE SERPL-MCNC: 182 MG/DL (ref 70–110)
HCO3 UR-SCNC: 14.9 MMOL/L (ref 24–28)
HCO3 UR-SCNC: 15.7 MMOL/L (ref 24–28)
HCO3 UR-SCNC: 15.8 MMOL/L (ref 24–28)
HCO3 UR-SCNC: 15.9 MMOL/L (ref 24–28)
HCO3 UR-SCNC: 16.4 MMOL/L (ref 24–28)
HCO3 UR-SCNC: 18.6 MMOL/L (ref 24–28)
HCT VFR BLD AUTO: 20 % (ref 40–54)
HCT VFR BLD AUTO: 20.6 % (ref 40–54)
HCT VFR BLD CALC: 18 %PCV (ref 36–54)
HCT VFR BLD CALC: 19 %PCV (ref 36–54)
HGB BLD-MCNC: 6.8 G/DL (ref 14–18)
HGB BLD-MCNC: 7 G/DL (ref 14–18)
IMM GRANULOCYTES # BLD AUTO: 0.17 K/UL (ref 0–0.04)
IMM GRANULOCYTES # BLD AUTO: 0.17 K/UL (ref 0–0.04)
IMM GRANULOCYTES NFR BLD AUTO: 1.4 % (ref 0–0.5)
IMM GRANULOCYTES NFR BLD AUTO: 1.6 % (ref 0–0.5)
INR PPP: 1.1 (ref 0.8–1.2)
LDH SERPL L TO P-CCNC: 0.32 MMOL/L (ref 0.36–1.25)
LDH SERPL L TO P-CCNC: 0.33 MMOL/L (ref 0.36–1.25)
LDH SERPL L TO P-CCNC: 0.33 MMOL/L (ref 0.36–1.25)
LDH SERPL L TO P-CCNC: 0.37 MMOL/L (ref 0.36–1.25)
LDH SERPL L TO P-CCNC: 0.39 MMOL/L (ref 0.36–1.25)
LDH SERPL L TO P-CCNC: 591 U/L (ref 110–260)
LYMPHOCYTES # BLD AUTO: 0.5 K/UL (ref 1–4.8)
LYMPHOCYTES # BLD AUTO: 0.6 K/UL (ref 1–4.8)
LYMPHOCYTES NFR BLD: 4.5 % (ref 18–48)
LYMPHOCYTES NFR BLD: 5.1 % (ref 18–48)
MAGNESIUM SERPL-MCNC: 2.1 MG/DL (ref 1.6–2.6)
MAGNESIUM SERPL-MCNC: 2.2 MG/DL (ref 1.6–2.6)
MAGNESIUM SERPL-MCNC: 2.3 MG/DL (ref 1.6–2.6)
MAGNESIUM SERPL-MCNC: 2.3 MG/DL (ref 1.6–2.6)
MAGNESIUM SERPL-MCNC: 2.4 MG/DL (ref 1.6–2.6)
MCH RBC QN AUTO: 28.9 PG (ref 27–31)
MCH RBC QN AUTO: 30.7 PG (ref 27–31)
MCHC RBC AUTO-ENTMCNC: 34 G/DL (ref 32–36)
MCHC RBC AUTO-ENTMCNC: 34 G/DL (ref 32–36)
MCV RBC AUTO: 85 FL (ref 82–98)
MCV RBC AUTO: 90 FL (ref 82–98)
METHEMOGLOBIN: 1.6 % (ref 0–3)
MODE: ABNORMAL
MODE: NORMAL
MONOCYTES # BLD AUTO: 0.7 K/UL (ref 0.3–1)
MONOCYTES # BLD AUTO: 1 K/UL (ref 0.3–1)
MONOCYTES NFR BLD: 7.2 % (ref 4–15)
MONOCYTES NFR BLD: 7.8 % (ref 4–15)
NEUTROPHILS # BLD AUTO: 10.4 K/UL (ref 1.8–7.7)
NEUTROPHILS # BLD AUTO: 8.9 K/UL (ref 1.8–7.7)
NEUTROPHILS NFR BLD: 84.9 % (ref 38–73)
NEUTROPHILS NFR BLD: 85.9 % (ref 38–73)
NRBC BLD-RTO: 0 /100 WBC
NRBC BLD-RTO: 0 /100 WBC
PCO2 BLDA: 25 MMHG (ref 35–45)
PCO2 BLDA: 26.5 MMHG (ref 35–45)
PCO2 BLDA: 27.2 MMHG (ref 35–45)
PCO2 BLDA: 27.5 MMHG (ref 35–45)
PEEP: 5
PH SMN: 7.37 [PH] (ref 7.35–7.45)
PH SMN: 7.37 [PH] (ref 7.35–7.45)
PH SMN: 7.38 [PH] (ref 7.35–7.45)
PH SMN: 7.38 [PH] (ref 7.35–7.45)
PH SMN: 7.39 [PH] (ref 7.35–7.45)
PH SMN: 7.44 [PH] (ref 7.35–7.45)
PHOSPHATE SERPL-MCNC: 2.2 MG/DL (ref 2.7–4.5)
PHOSPHATE SERPL-MCNC: 4 MG/DL (ref 2.7–4.5)
PHOSPHATE SERPL-MCNC: 4.6 MG/DL (ref 2.7–4.5)
PLATELET # BLD AUTO: 236 K/UL (ref 150–450)
PLATELET # BLD AUTO: 249 K/UL (ref 150–450)
PMV BLD AUTO: 10.6 FL (ref 9.2–12.9)
PMV BLD AUTO: 10.6 FL (ref 9.2–12.9)
PO2 BLDA: 161 MMHG (ref 80–100)
PO2 BLDA: 167 MMHG (ref 80–100)
PO2 BLDA: 195 MMHG (ref 80–100)
PO2 BLDA: 206 MMHG (ref 80–100)
PO2 BLDA: 208 MMHG (ref 80–100)
PO2 BLDA: 441 MMHG (ref 80–100)
POC BE: -10 MMOL/L
POC BE: -6 MMOL/L
POC BE: -9 MMOL/L
POC IONIZED CALCIUM: 1.09 MMOL/L (ref 1.06–1.42)
POC IONIZED CALCIUM: 1.11 MMOL/L (ref 1.06–1.42)
POC METHB: 1.6 % (ref 0–3)
POC PERFORMED BY: NORMAL
POC SATURATED O2: 100 % (ref 95–100)
POC SATURATED O2: 99 % (ref 95–100)
POC TCO2: 16 MMOL/L (ref 23–27)
POC TCO2: 17 MMOL/L (ref 23–27)
POC TCO2: 19 MMOL/L (ref 23–27)
POC TEMPERATURE: 37 C
POCT GLUCOSE: 144 MG/DL (ref 70–110)
POCT GLUCOSE: 165 MG/DL (ref 70–110)
POCT GLUCOSE: 179 MG/DL (ref 70–110)
POCT GLUCOSE: 209 MG/DL (ref 70–110)
POTASSIUM BLD-SCNC: 3.5 MMOL/L (ref 3.5–5.1)
POTASSIUM BLD-SCNC: 4.4 MMOL/L (ref 3.5–5.1)
POTASSIUM SERPL-SCNC: 4.3 MMOL/L (ref 3.5–5.1)
POTASSIUM SERPL-SCNC: 4.4 MMOL/L (ref 3.5–5.1)
POTASSIUM SERPL-SCNC: 4.4 MMOL/L (ref 3.5–5.1)
POTASSIUM SERPL-SCNC: 4.7 MMOL/L (ref 3.5–5.1)
POTASSIUM SERPL-SCNC: 4.7 MMOL/L (ref 3.5–5.1)
POTASSIUM SERPL-SCNC: 4.9 MMOL/L (ref 3.5–5.1)
POTASSIUM SERPL-SCNC: 5.1 MMOL/L (ref 3.5–5.1)
PROT SERPL-MCNC: 6.1 G/DL (ref 6–8.4)
PROTHROMBIN TIME: 11.4 SEC (ref 9–12.5)
PROTHROMBIN TIME: 11.6 SEC (ref 9–12.5)
PROTHROMBIN TIME: 11.7 SEC (ref 9–12.5)
PROTHROMBIN TIME: 11.8 SEC (ref 9–12.5)
RBC # BLD AUTO: 2.28 M/UL (ref 4.6–6.2)
RBC # BLD AUTO: 2.35 M/UL (ref 4.6–6.2)
SAMPLE: ABNORMAL
SAMPLE: NORMAL
SAMPLE: NORMAL
SITE: ABNORMAL
SITE: NORMAL
SITE: NORMAL
SODIUM BLD-SCNC: 136 MMOL/L (ref 136–145)
SODIUM BLD-SCNC: 136 MMOL/L (ref 136–145)
SODIUM SERPL-SCNC: 139 MMOL/L (ref 136–145)
SODIUM SERPL-SCNC: 139 MMOL/L (ref 136–145)
SODIUM SERPL-SCNC: 140 MMOL/L (ref 136–145)
SPECIMEN SOURCE: NORMAL
VT: 450
WBC # BLD AUTO: 10.31 K/UL (ref 3.9–12.7)
WBC # BLD AUTO: 12.25 K/UL (ref 3.9–12.7)

## 2023-12-07 PROCEDURE — 25000003 PHARM REV CODE 250

## 2023-12-07 PROCEDURE — 63600367 HC NITRIC OXIDE PER HOUR

## 2023-12-07 PROCEDURE — 63600175 PHARM REV CODE 636 W HCPCS: Performed by: STUDENT IN AN ORGANIZED HEALTH CARE EDUCATION/TRAINING PROGRAM

## 2023-12-07 PROCEDURE — 83735 ASSAY OF MAGNESIUM: CPT | Mod: 91 | Performed by: STUDENT IN AN ORGANIZED HEALTH CARE EDUCATION/TRAINING PROGRAM

## 2023-12-07 PROCEDURE — 84132 ASSAY OF SERUM POTASSIUM: CPT | Performed by: THORACIC SURGERY (CARDIOTHORACIC VASCULAR SURGERY)

## 2023-12-07 PROCEDURE — 25000003 PHARM REV CODE 250: Performed by: STUDENT IN AN ORGANIZED HEALTH CARE EDUCATION/TRAINING PROGRAM

## 2023-12-07 PROCEDURE — 63600175 PHARM REV CODE 636 W HCPCS

## 2023-12-07 PROCEDURE — 37799 UNLISTED PX VASCULAR SURGERY: CPT

## 2023-12-07 PROCEDURE — 27100171 HC OXYGEN HIGH FLOW UP TO 24 HOURS

## 2023-12-07 PROCEDURE — 25000003 PHARM REV CODE 250: Performed by: THORACIC SURGERY (CARDIOTHORACIC VASCULAR SURGERY)

## 2023-12-07 PROCEDURE — 85610 PROTHROMBIN TIME: CPT | Mod: 91 | Performed by: THORACIC SURGERY (CARDIOTHORACIC VASCULAR SURGERY)

## 2023-12-07 PROCEDURE — 82800 BLOOD PH: CPT

## 2023-12-07 PROCEDURE — 99232 SBSQ HOSP IP/OBS MODERATE 35: CPT | Mod: ,,,

## 2023-12-07 PROCEDURE — 80069 RENAL FUNCTION PANEL: CPT | Performed by: STUDENT IN AN ORGANIZED HEALTH CARE EDUCATION/TRAINING PROGRAM

## 2023-12-07 PROCEDURE — 99291 CRITICAL CARE FIRST HOUR: CPT | Mod: ,,, | Performed by: REGISTERED NURSE

## 2023-12-07 PROCEDURE — 85025 COMPLETE CBC W/AUTO DIFF WBC: CPT | Mod: 91 | Performed by: THORACIC SURGERY (CARDIOTHORACIC VASCULAR SURGERY)

## 2023-12-07 PROCEDURE — 99291 CRITICAL CARE FIRST HOUR: CPT | Mod: ,,, | Performed by: ANESTHESIOLOGY

## 2023-12-07 PROCEDURE — 80100008 HC CRRT DAILY MAINTENANCE

## 2023-12-07 PROCEDURE — 25000003 PHARM REV CODE 250: Performed by: INTERNAL MEDICINE

## 2023-12-07 PROCEDURE — 84295 ASSAY OF SERUM SODIUM: CPT

## 2023-12-07 PROCEDURE — 85384 FIBRINOGEN ACTIVITY: CPT | Performed by: THORACIC SURGERY (CARDIOTHORACIC VASCULAR SURGERY)

## 2023-12-07 PROCEDURE — 82330 ASSAY OF CALCIUM: CPT | Performed by: STUDENT IN AN ORGANIZED HEALTH CARE EDUCATION/TRAINING PROGRAM

## 2023-12-07 PROCEDURE — 83050 HGB METHEMOGLOBIN QUAN: CPT

## 2023-12-07 PROCEDURE — 85730 THROMBOPLASTIN TIME PARTIAL: CPT | Mod: 91 | Performed by: THORACIC SURGERY (CARDIOTHORACIC VASCULAR SURGERY)

## 2023-12-07 PROCEDURE — 63600175 PHARM REV CODE 636 W HCPCS: Performed by: ANESTHESIOLOGY

## 2023-12-07 PROCEDURE — 27000248 HC VAD-ADDITIONAL DAY

## 2023-12-07 PROCEDURE — 99900026 HC AIRWAY MAINTENANCE (STAT)

## 2023-12-07 PROCEDURE — 83735 ASSAY OF MAGNESIUM: CPT | Mod: 91 | Performed by: THORACIC SURGERY (CARDIOTHORACIC VASCULAR SURGERY)

## 2023-12-07 PROCEDURE — 83735 ASSAY OF MAGNESIUM: CPT | Performed by: STUDENT IN AN ORGANIZED HEALTH CARE EDUCATION/TRAINING PROGRAM

## 2023-12-07 PROCEDURE — 82803 BLOOD GASES ANY COMBINATION: CPT

## 2023-12-07 PROCEDURE — 83615 LACTATE (LD) (LDH) ENZYME: CPT | Performed by: STUDENT IN AN ORGANIZED HEALTH CARE EDUCATION/TRAINING PROGRAM

## 2023-12-07 PROCEDURE — 83605 ASSAY OF LACTIC ACID: CPT

## 2023-12-07 PROCEDURE — 82330 ASSAY OF CALCIUM: CPT

## 2023-12-07 PROCEDURE — 94003 VENT MGMT INPAT SUBQ DAY: CPT

## 2023-12-07 PROCEDURE — 90945 DIALYSIS ONE EVALUATION: CPT

## 2023-12-07 PROCEDURE — 63600175 PHARM REV CODE 636 W HCPCS: Performed by: INTERNAL MEDICINE

## 2023-12-07 PROCEDURE — C9113 INJ PANTOPRAZOLE SODIUM, VIA: HCPCS

## 2023-12-07 PROCEDURE — 94761 N-INVAS EAR/PLS OXIMETRY MLT: CPT | Mod: XB

## 2023-12-07 PROCEDURE — 84132 ASSAY OF SERUM POTASSIUM: CPT

## 2023-12-07 PROCEDURE — 99223 1ST HOSP IP/OBS HIGH 75: CPT | Mod: ,,, | Performed by: INTERNAL MEDICINE

## 2023-12-07 PROCEDURE — 84100 ASSAY OF PHOSPHORUS: CPT | Performed by: THORACIC SURGERY (CARDIOTHORACIC VASCULAR SURGERY)

## 2023-12-07 PROCEDURE — 85014 HEMATOCRIT: CPT

## 2023-12-07 PROCEDURE — 99223 1ST HOSP IP/OBS HIGH 75: CPT | Mod: ,,, | Performed by: PSYCHIATRY & NEUROLOGY

## 2023-12-07 PROCEDURE — 80053 COMPREHEN METABOLIC PANEL: CPT | Performed by: STUDENT IN AN ORGANIZED HEALTH CARE EDUCATION/TRAINING PROGRAM

## 2023-12-07 PROCEDURE — 99233 SBSQ HOSP IP/OBS HIGH 50: CPT | Mod: ,,, | Performed by: INTERNAL MEDICINE

## 2023-12-07 PROCEDURE — 93750 INTERROGATION VAD IN PERSON: CPT | Mod: ,,, | Performed by: INTERNAL MEDICINE

## 2023-12-07 PROCEDURE — 99900035 HC TECH TIME PER 15 MIN (STAT)

## 2023-12-07 PROCEDURE — 20000000 HC ICU ROOM

## 2023-12-07 RX ORDER — INSULIN ASPART 100 [IU]/ML
0-10 INJECTION, SOLUTION INTRAVENOUS; SUBCUTANEOUS EVERY 4 HOURS PRN
Status: DISCONTINUED | OUTPATIENT
Start: 2023-12-07 | End: 2023-12-11

## 2023-12-07 RX ORDER — DEXMEDETOMIDINE HYDROCHLORIDE 4 UG/ML
0-1.4 INJECTION, SOLUTION INTRAVENOUS CONTINUOUS
Status: DISCONTINUED | OUTPATIENT
Start: 2023-12-07 | End: 2023-12-10

## 2023-12-07 RX ORDER — GLUCAGON 1 MG
1 KIT INJECTION
Status: DISCONTINUED | OUTPATIENT
Start: 2023-12-07 | End: 2024-01-13

## 2023-12-07 RX ORDER — MAGNESIUM SULFATE HEPTAHYDRATE 40 MG/ML
2 INJECTION, SOLUTION INTRAVENOUS
Status: DISCONTINUED | OUTPATIENT
Start: 2023-12-07 | End: 2023-12-07

## 2023-12-07 RX ORDER — HYDROCODONE BITARTRATE AND ACETAMINOPHEN 500; 5 MG/1; MG/1
TABLET ORAL CONTINUOUS
Status: DISCONTINUED | OUTPATIENT
Start: 2023-12-07 | End: 2023-12-07

## 2023-12-07 RX ORDER — POLYETHYLENE GLYCOL 3350 17 G/17G
17 POWDER, FOR SOLUTION ORAL 2 TIMES DAILY
Status: DISCONTINUED | OUTPATIENT
Start: 2023-12-07 | End: 2023-12-11

## 2023-12-07 RX ORDER — AMOXICILLIN 250 MG
2 CAPSULE ORAL 2 TIMES DAILY
Status: DISCONTINUED | OUTPATIENT
Start: 2023-12-07 | End: 2023-12-11

## 2023-12-07 RX ORDER — MAGNESIUM SULFATE HEPTAHYDRATE 40 MG/ML
2 INJECTION, SOLUTION INTRAVENOUS
Status: DISCONTINUED | OUTPATIENT
Start: 2023-12-07 | End: 2023-12-08

## 2023-12-07 RX ORDER — DEXTROSE MONOHYDRATE 100 MG/ML
INJECTION, SOLUTION INTRAVENOUS CONTINUOUS PRN
Status: DISCONTINUED | OUTPATIENT
Start: 2023-12-07 | End: 2024-01-13

## 2023-12-07 RX ORDER — CALCIUM GLUCONATE 20 MG/ML
1 INJECTION, SOLUTION INTRAVENOUS
Status: COMPLETED | OUTPATIENT
Start: 2023-12-07 | End: 2023-12-07

## 2023-12-07 RX ADMIN — DEXMEDETOMIDINE HYDROCHLORIDE 0.4 MCG/KG/HR: 4 INJECTION, SOLUTION INTRAVENOUS at 01:12

## 2023-12-07 RX ADMIN — Medication 324 MG: at 08:12

## 2023-12-07 RX ADMIN — ATORVASTATIN CALCIUM 40 MG: 10 TABLET, FILM COATED ORAL at 08:12

## 2023-12-07 RX ADMIN — PANTOPRAZOLE SODIUM 40 MG: 40 INJECTION, POWDER, FOR SOLUTION INTRAVENOUS at 08:12

## 2023-12-07 RX ADMIN — MIDODRINE HYDROCHLORIDE 5 MG: 5 TABLET ORAL at 08:12

## 2023-12-07 RX ADMIN — CALCIUM GLUCONATE 1 G: 20 INJECTION, SOLUTION INTRAVENOUS at 11:12

## 2023-12-07 RX ADMIN — POLYETHYLENE GLYCOL 3350 17 G: 17 POWDER, FOR SOLUTION ORAL at 08:12

## 2023-12-07 RX ADMIN — EPINEPHRINE 0.03 MCG/KG/MIN: 1 INJECTION INTRAMUSCULAR; INTRAVENOUS; SUBCUTANEOUS at 03:12

## 2023-12-07 RX ADMIN — PIPERACILLIN SODIUM AND TAZOBACTAM SODIUM 4.5 G: 4; .5 INJECTION, POWDER, FOR SOLUTION INTRAVENOUS at 04:12

## 2023-12-07 RX ADMIN — DEXMEDETOMIDINE HYDROCHLORIDE 0.6 MCG/KG/HR: 4 INJECTION, SOLUTION INTRAVENOUS at 08:12

## 2023-12-07 RX ADMIN — PROPOFOL 30 MCG/KG/MIN: 10 INJECTION, EMULSION INTRAVENOUS at 07:12

## 2023-12-07 RX ADMIN — WARFARIN SODIUM 2 MG: 2 TABLET ORAL at 04:12

## 2023-12-07 RX ADMIN — CALCIUM GLUCONATE: 98 INJECTION, SOLUTION INTRAVENOUS at 08:12

## 2023-12-07 RX ADMIN — PROPOFOL 35 MCG/KG/MIN: 10 INJECTION, EMULSION INTRAVENOUS at 12:12

## 2023-12-07 RX ADMIN — SENNOSIDES AND DOCUSATE SODIUM 1 TABLET: 8.6; 5 TABLET ORAL at 08:12

## 2023-12-07 RX ADMIN — CALCIUM GLUCONATE 1 G: 20 INJECTION, SOLUTION INTRAVENOUS at 12:12

## 2023-12-07 RX ADMIN — HEPARIN SODIUM 600 UNITS/HR: 10000 INJECTION, SOLUTION INTRAVENOUS at 12:12

## 2023-12-07 RX ADMIN — DEXTROSE MONOHYDRATE, SODIUM CITRATE, AND CITRIC ACID MONOHYDRATE: 2.45; 2.2; .8 INJECTION, SOLUTION INTRAVENOUS at 08:12

## 2023-12-07 RX ADMIN — INSULIN ASPART 2 UNITS: 100 INJECTION, SOLUTION INTRAVENOUS; SUBCUTANEOUS at 08:12

## 2023-12-07 RX ADMIN — AMIODARONE HYDROCHLORIDE 400 MG: 200 TABLET ORAL at 08:12

## 2023-12-07 NOTE — PROGRESS NOTES
12/07/23 0749   Treatment   Treatment Type Other  (CVVHDF)   Treatment Status Order change   Dialysis Machine Number [id number]   Solutions Labeled and Current  Yes   Access Left;IJ;Tunneled Cath   Catheter Dressing Intact  Yes   Alarms Engaged Yes   CRRT Comments order change verified and updated. Treatment continued.   CRRT Prismaflex Hourly Documentation   Access Pressure (mmHg) -62 mmHg   Filter Pressure (mmHg) 145 mmHg   Transmembrane Pressure (mmHg) 95 mmHg   Pressure Drop (mmHg) 55 mmHg   Return Pressure (mmHg) 54 mmHg   Effluent Pressure (mmHg) -10 mmHg   Pre Replacement Fluid Rate (mL/hour) 1400 mL/hour   Post Replacement Fluid Rate (mL/hour) 200 mL/hour   Deaeration Chamber Level Adjusted? Yes     New orders verified and updated.  Treatment continued. Bedside nurse updated.

## 2023-12-07 NOTE — NURSING
updated on patient hemodynamics, I/O, and labs. Orders received via telephone for decrease I epi to 0.04mcg/kg/min and decrease in midodrine dose to 5mg BID. All orders received and implemented; will continue to monitor.

## 2023-12-07 NOTE — PT/OT/SLP PROGRESS
Physical Therapy  Consult    Patient Name:  Radha Abbott   MRN:  [id number]  Admitting Diagnosis:  Acute on chronic combined systolic and diastolic CHF, NYHA class 4   Recent Surgery: Procedure(s) (LRB):  CLOSURE, WOUND, STERNUM (N/A)  INSERTION, GRAFT, PERICARDIUM  DRAINAGE, PLEURAL EFFUSION 3 Days Post-Op  Admit Date: 11/9/2023  Length of Stay: 28 days    Physical Therapy orders received and acknowledged. Patient not seen today due to pt remains intubated and going for head CT due to continued altered mentation. PT to attempt when Radha Abbott is medically appropriate for progressive mobility.    Elizabeth Cee, PT, DPT  12/7/2023  Pager: [phone]

## 2023-12-07 NOTE — PROGRESS NOTES
[facility] - [facility] Intensive [facility]  Heart Transplant  Progress Note    Patient Name: Radha Abbott  MRN: [id number]  Admission Date: 11/9/2023  Hospital Length of Stay: 28 days  Attending Physician: Yuri Washington MD  Primary Care Provider: Vasu Kong MD  Principal Problem:Acute on chronic combined systolic and diastolic CHF, NYHA class 4    Subjective:   Interval History: Patient POD #6 s/p HM3 LVAD implant. Re-intubated yesterday afternoon. CTH this AM was negative. Neurology consulted. BALs from bronch with rare GNRs, now on Zosyn. Epi weaned to 0.04. Remains on  5. Volume removal with Prismax net 50cc/hr. LVAD speed increased to 4900RPMs. Echo ordered for tomorrow.       Continuous Infusions:   sodium chloride 0.9%      sodium chloride 0.9% Stopped (12/05/23 1634)    DOBUTamine 5 mcg/kg/min (12/07/23 1000)    EPINEPHrine 0.04 mcg/kg/min (12/07/23 1000)    heparin (porcine) in 5 % dex 700 Units/hr (12/07/23 1000)    nitric oxide gas      NORepinephrine bitartrate-D5W      propofoL 40 mcg/kg/min (12/07/23 1000)    vasopressin Stopped (12/06/23 1415)     Scheduled Meds:   amiodarone  400 mg Per NG tube Daily    atorvastatin  40 mg Per NG tube Daily    calcium gluconate IVPB  1 g Intravenous Q1H    ferrous gluconate  324 mg Per NG tube Daily with breakfast    midodrine  5 mg Per NG tube BID    pantoprazole  40 mg Intravenous BID    polyethylene glycol  17 g Per NG tube BID    senna-docusate 8.6-50 mg  2 tablet Per NG tube BID    warfarin  2 mg Per NG tube Daily     PRN Meds:acetaminophen, albumin human 5%, albuterol sulfate, bisacodyL, dextrose 10%, dextrose 10%, magnesium hydroxide 400 mg/5 ml, magnesium sulfate IVPB, potassium chloride, potassium chloride, QUEtiapine, Flushing PICC/Midline Protocol **AND** [DISCONTINUED] sodium chloride 0.9% **AND** sodium chloride 0.9%, sodium phosphate 20.01 mmol in dextrose 5 % (D5W) 250 mL IVPB, sodium phosphate 30 mmol in dextrose 5 % (D5W) 250 mL IVPB, sodium  phosphate 39.99 mmol in dextrose 5 % (D5W) 250 mL IVPB    Review of patient's allergies indicates:  No Known Allergies  Objective:     Vital Signs (Most Recent):  Temp: 99 °F (37.2 °C) (12/07/23 0700)  Pulse: 90 (12/07/23 1045)  Resp: (!) 28 (12/06/23 0500)  BP: (!) 82/0 (12/07/23 0700)  SpO2: 100 % (12/07/23 1045) Vital Signs (24h Range):  Temp:  [97.6 °F (36.4 °C)-99 °F (37.2 °C)] 99 °F (37.2 °C)  Pulse:  [] 90  SpO2:  [77 %-100 %] 100 %  BP: (66-82)/(0) 82/0  Arterial Line BP: (61-92)/(48-73) 76/63     Patient Vitals for the past 72 hrs (Last 3 readings):   Weight   12/07/23 0501 95 kg (209 lb 7 oz)   12/07/23 0500 95 kg (209 lb 7 oz)   12/06/23 1107 92.5 kg (204 lb)       Body mass index is 28.4 kg/m².      Intake/Output Summary (Last 24 hours) at 12/7/2023 1058  Last data filed at 12/7/2023 1000  Gross per 24 hour   Intake 1217.12 ml   Output 2674 ml   Net -1456.88 ml         Hemodynamic Parameters:       Telemetry: NSR        Physical Exam  Constitutional:       Appearance: He is ill-appearing.      Comments: Intubated and sedated    HENT:      Head: Normocephalic and atraumatic.   Eyes:      Pupils: Pupils are equal, round, and reactive to light.   Cardiovascular:      Rate and Rhythm: Normal rate and regular rhythm.      Comments: LVAD hum  Pulmonary:      Breath sounds: Rhonchi and rales present.   Chest:      Comments: CT x2  Abdominal:      General: Abdomen is flat. Bowel sounds are normal. There is no distension.      Palpations: Abdomen is soft.   Musculoskeletal:         General: Normal range of motion.      Cervical back: Neck supple.   Skin:     General: Skin is warm and dry.   Neurological:      Comments: Not following commands. Jessica            Significant Labs:  CBC:  Recent Labs   Lab 12/06/23  0410 12/06/23  0537 12/06/23  1135 12/06/23  1616 12/07/23  0417   WBC 12.35 12.24  --   --  12.25   RBC 1.82* 2.42*  --   --  2.35*   HGB 5.2* 7.1*  --   --  6.8*   HCT 15.4* 21.0* 23* 19* 20.0*     209  --   --  236   MCV 85 87  --   --  85   MCH 28.6 29.3  --   --  28.9   MCHC 33.8 33.8  --   --  34.0       BNP:  Recent Labs   Lab 12/04/23 0412 12/06/23 0410   * 1,679*       CMP:  Recent Labs   Lab 12/05/23  0745 12/05/23  1821 12/06/23 0410 12/06/23  1128 12/06/23  1613 12/06/23 1819 12/06/23 2352 12/07/23 0417 12/07/23  0758   *   < > 98  96  --  136*  --   --  128*  --    CALCIUM 8.5*   < > 8.3*  8.3*  --  7.9*  --   --  8.4*  --    ALBUMIN 2.5*   < > 2.2*  2.2*  2.2*  --  2.1*  --   --  2.2*  --    PROT 6.0  --  5.8*  5.9*  --   --   --   --  6.1  --       < > 140  140  --  139  --   --  139  --    K 4.0   < > 4.2  4.2   < > 5.0   < > 4.9 5.1 4.7   CO2 17*   < > 19*  22*  --  18*  --   --  15*  --       < > 106  106  --  107  --   --  105  --    BUN 72*   < > 33*  27*  --  20  --   --  22  --    CREATININE 3.5*   < > 2.0*  1.6*  --  1.4  --   --  1.5*  --    ALKPHOS 90  --  115  121  --   --   --   --  129  --    ALT 24  --  17  17  --   --   --   --  33  --    AST 38  --  49*  47*  --   --   --   --  79*  --    BILITOT 0.9  --  0.9  0.9  --   --   --   --  0.7  --     < > = values in this interval not displayed.        Coagulation:   Recent Labs   Lab 12/06/23 1819 12/06/23 2352 12/07/23 0417 12/07/23  0826   INR 1.1 1.1 1.1  --    APTT 35.5* 34.2* 33.6* 32.2*       LDH:  Recent Labs   Lab 12/05/23  0406 12/06/23  0410 12/07/23  0417   * 584* 591*       Microbiology:  Microbiology Results (last 7 days)       Procedure Component Value Units Date/Time    AFB Culture & Smear [[id number]] Collected: 12/06/23 1532    Order Status: Completed Specimen: Body Fluid from Lung, Right Updated: 12/07/23 1057     AFB CULTURE STAIN No acid fast bacilli seen.    Narrative:      Bronchial Wash    Culture, Respiratory [[id number]] Collected: 12/06/23 1531    Order Status: Completed Specimen: Respiratory from Bronchial Wash Updated: 12/07/23 0845      Respiratory Culture No Growth     Gram Stain (Respiratory) Moderate WBC's     Gram Stain (Respiratory) Rare Gram negative rods    Narrative:      Bronchial Wash    Direct AFB stain [[id number]] Collected: 12/06/23 1532    Order Status: Completed Specimen: Body Fluid from Lung, Right Updated: 12/06/23 2257     Direct Acid Fast No acid fast bacilli seen.    Narrative:      Bronchial Wash    Fungus culture [[id number]] Collected: 12/06/23 1532    Order Status: Sent Specimen: Body Fluid from Lung, Right Updated: 12/06/23 1719    Gram stain [[id number]] Collected: 12/06/23 1532    Order Status: Canceled Specimen: Body Fluid from Lung, Right     Blood culture [[id number]] Collected: 11/28/23 1452    Order Status: Completed Specimen: Blood from Peripheral, Upper Arm, Right Updated: 12/03/23 1612     Blood Culture, Routine No growth after 5 days.    Blood culture [[id number]] Collected: 11/28/23 1450    Order Status: Completed Specimen: Blood from Peripheral, Forearm, Right Updated: 12/03/23 1612     Blood Culture, Routine No growth after 5 days.            BMP:   Recent Labs   Lab 12/07/23  0417 12/07/23  0758   *  --      --    K 5.1 4.7     --    CO2 15*  --    BUN 22  --    CREATININE 1.5*  --    CALCIUM 8.4*  --    MG 2.4 2.3       I have reviewed all pertinent labs within the past 24 hours.    Estimated Creatinine Clearance: 61.9 mL/min (A) (based on SCr of 1.5 mg/dL (H)).    Diagnostic Results:  I have reviewed all pertinent imaging results/findings within the past 24 hours.  Assessment and Plan:     63 year old male with hx of CAD s/p 3v CABG (unclear anatomy, 2009), ICM with a recent EF of 15-20% s/p ICD (medtronik 2009), DM2 (a1c 7.7), HTN, HLD, Vfib on amio who presents to the ED with CC of SOB     [place] [facility] [place].  He was started on a Lasix gtt and did well.  Started on gDMT and discharged home on  5 with plans to follow up in [facility] clinic for ongoing transplant  evaluation at another facility.  He says that about a few days ago he started to notice LE swelling.  This turned into Nelson and orthopnea.  He says he can't walk to the bathroom without getting SOB.  He also complains of weight gain.  He takes torsemide 20mg BID at home and was told to trial additional Lasix which he did without any improvement.  He was rx metolazone but has not been filled.  He came to the ED     In the ED he was AF with stable VS on RA.  CBC showing chronic anemia.  CMP notable for acute on chronic CKD with baseline around 2.1 and Cr now 2.6.  BNP elevated.  Lactate neg.  CXR showing pulmonary edema.  I evaluated the pt at the bedside.  Bedside TTE showing CVP >15.  He was subsequently admitted to the CCU for diuresis.     He was continued on his home  and was started on a lasix gtt, which he responded well to overnight (net -1700cc. He feels much better this morning as well. Our transplant coordinators have been working on insurance approval and he is now being transferred to [facility] service for transplant evaluation.     * Acute on chronic combined systolic and diastolic CHF, NYHA class 4  Pt with known ICM with an EF of 25% on home  presented with decompensated HF.  Not in cardiogenic shock    RHC 11/14: RA 14, PA 70/35, PCWP 32, CO 4.1, CI 2.1.   Repeat RHC 11/20 RA RA  20  PA 60/29 (39)  PCWP 29    CO  4.6 l/min  CI  2.3 l/min/m2  2D echo 11/21 showed EF 15-20%, mild RV dysfunction, mild MR, LVEDD 6.9     Repeat on 11/24 showed EF 10-15%, RV mildly reduced, mild-mod MR, LVEDD 6.42  - Home  gtt at 5  - home GDMT: entresto 24/26mg BID (on hold 2/2 IVÁN), Hydralazine 25 q8h - held prior to LVAD implant   - home diuretics: Was taking lasix 40 bid  - Bridged to LVAD with IABP s/p HM3 implant 12/1        -PT/OT and nutrition   -s/p LVAD (12/1) Speed increased on 12/7 to 4900 RPMs  - Remains on  5, epi 0.04, Ketty 10 ppm  - CTS primary  -f/u echo tomorrow     Altered mental  status  -multifactorial  -likely metabolic encephalopathy +/- icu delirium   -[facility] 12/7 negative for acute process  -Continue RRT for metabolic clearance  -provide frequent re-orientation  -Promote sleep/wake cycle   -Neurology consulted     LVAD (left ventricular assist device) present  -HeartMate 3 Implanted  12/1/2023  as DT  -CTS Primary  -Implanted by Dr. Washington  -HOLD Coumadin,  Goal INR 2.0-3.0 . Subtherapeutic today. On Heparin gtt   -Antiplatelets Not on ASA  -LDH is stable overall today. Will continue to monitor daily.  -Speed set at  4900   rpm, LSL 4500 rpm   -Interrogation notable for no events  -Not listed for OHTx, declined for OHTX due to comorbidities         Procedure: Device Interrogation Including analysis of device parameters  Current Settings: Ventricular Assist Device  Review of device function is stable/unstable stable        12/7/2023    10:00 AM 12/7/2023     9:00 AM 12/7/2023     8:00 AM 12/7/2023     7:00 AM 12/7/2023     6:00 AM 12/7/2023     5:00 AM 12/7/2023     4:00 AM   TXP LVAD INTERROGATIONS   Type HeartMate3 HeartMate3 HeartMate3 HeartMate3 HeartMate3 HeartMate3 HeartMate3   Flow 4.1 4 3.6 3.7 3.8 3.7 3.9   Speed 4900 4800 4800 4800 4800 4800 4800   PI 3.7 4.3 4.5 5.1 4.1 4.2 3.8   Power ([place]) 3.3 3.1 3.1 3.2 3.1 3.1 3.1   LSL 4500 4400 4400 4400 4400 4400 4400   Pulsatility Intermittent pulse Intermittent pulse Intermittent pulse Intermittent pulse Intermittent pulse Intermittent pulse Intermittent pulse         PAF (paroxysmal atrial fibrillation)  Known hx of pAF. In sinus rhythm on admission, but 1 run of AF RVR overnight. He spontaneously converted.  - Continue home amiodarone 400mg qd  - Stop home Eliquis and continue heparin gtt while in the hospital.       Acute renal failure with acute tubular necrosis superimposed on stage 3b chronic kidney disease  IVÁN on CKD2. Baseline Cr of 1.7-2.0.   - Hold ARNi, entresto  -Trend BMPs daily   -SrCr 1.5/BUN 22 today  -Prismax for  volume removal   -Nephrology following     Type 2 diabetes mellitus without complication, without long-term current use of insulin  -A1c 7.6, not insulin dependent  - MDSSI  -Tfs for nutrition   - Endocrine following, appreciate assistance with blood glucose management    CAD (coronary artery disease)  -S/p 3vCABG in 2009  - home ASA, HI statin- on hold after surgery     Ventricular tachycardia  Hx VT s/p ICD placement (medtronic 2009)  - Continue home amio 400mg qd      .Uninterrupted Critical Care/Counseling Time (not including procedures): 50 mins     Hugh Duran NP  Heart Transplant  [facility] - [facility] Int[facility]

## 2023-12-07 NOTE — ASSESSMENT & PLAN NOTE
62 yo man s/p destination therapy left ventricular assist device placement. Postoperatively the chest was left open due to diffuse coagulopathy and RV dysfunction.  He did well post-operatively and underwent washout and closure on 12/4. The patient was extubated but had to be reintubated. A BAL was performed: Gram stain with GNB, culture is NGTD. Given his recent history, I feel more comfortable over treating him rather than under treating him. Discussed with Dr. Washington.    Recommendations  Start empiric Zosyn  Await final respiratory culture results  Will de-escalate quickly as allowed by patient's clinical response to therapy

## 2023-12-07 NOTE — PLAN OF CARE
SICU PLAN OF CARE NOTE    Dx: Acute on chronic combined systolic and diastolic CHF, NYHA class 4    Shift Events: CT head.  Nitric weaned.  Precedex started.  ID and neuro consult    Goals of Care: MAP 70-80    Neuro: Sedated, Arouses to Voice, and Moves All Extremities    Vital Signs: BP (!) 72/0 (BP Location: Left arm, Patient Position: Lying)   Pulse 90   Temp 99.2 °F (37.3 °C) (Oral)   Resp (!) 28   Ht 6' (1.829 m)   Wt 95 kg (209 lb 7 oz)   SpO2 100%   BMI 28.40 kg/m²     Cardiac: NSR     Respiratory: Ventilator    Diet: NPO and Tube Feeds    Gtts: Precedex, Epinephrine, Dobutamine, and Heparin    Urine Output: Anuric 0 cc/shift    Drains: Chest Tube, total output 35 cc /  shift and NG/OG Tube 0 cc /  shift    VAD (HM3, speed 4900)    Restraints (soft L and R wrist)    Labs/Accuchecks: Accuchecks q4, daily labs, renal q8, aptt q6.    Skin: pt skin dry and intact.  Prevena wound vac in place over midsternal incision.  VAD dressing clean and intact.  No skin breakdown during shift.  Foam padding in place on sacrum and heels.      View flowsheet for full assessment details.

## 2023-12-07 NOTE — SUBJECTIVE & OBJECTIVE
Interval History: Patient seen and examined this AM. Wife at bedside. Intubated yesterday for respiratory distress. Remains encephalopathic for which he is to undergo CT head. Tolerating Prismax/CRRT. Afebrile with pulse ranging from 90-80s bpm. Systolic blood pressures ranging from 90-80s mmHg via arterial line on epinephrine 0.04 (in addition to dobutamine at 5 for ionotropic support). He is saturating 100% on FiO2 50% and PEEP 5 mmHg with no documented UOP in the last 24 hours. Net negative ~1.53 liters. Serum potassium 5.1 and bicarbonate 15. Will increase clearance to 30 mL/kg/hr.    Review of patient's allergies indicates:  No Known Allergies  Current Facility-Administered Medications   Medication Frequency    0.9%  NaCl infusion Continuous    acetaminophen tablet 650 mg Q6H PRN    albumin human 5% bottle 25 g PRN    albuterol sulfate nebulizer solution 2.5 mg Q4H PRN    amiodarone tablet 400 mg Daily    atorvastatin tablet 40 mg Daily    bisacodyL suppository 10 mg Daily PRN    dextrose 10% bolus 125 mL 125 mL PRN    dextrose 10% bolus 250 mL 250 mL PRN    DOBUtamine 1000 mg in D5W 250 mL infusion Continuous    EPINEPHrine 5 mg in dextrose 5% 250 mL infusion (premix) Continuous    ferrous gluconate tablet 324 mg Daily with breakfast    heparin 25,000 units in dextrose 5% 250 mL (100 units/mL) infusion (heparin infusion - NO NOMOGRAM) Continuous    magnesium hydroxide 400 mg/5 ml suspension 2,400 mg Daily PRN    magnesium sulfate 2g in water 50mL IVPB (premix) PRN    midodrine tablet 5 mg BID    nitric oxide gas Gas 10 ppm Continuous    NORepinephrine 4 mg in dextrose 5% 250 mL infusion (premix) Continuous    pantoprazole injection 40 mg BID    polyethylene glycol packet 17 g Daily    potassium chloride 20 mEq in 100 mL IVPB (FOR CENTRAL LINE ADMINISTRATION ONLY) PRN    potassium chloride 20 mEq in 100 mL IVPB (FOR CENTRAL LINE ADMINISTRATION ONLY) PRN    propofol (DIPRIVAN) 10 mg/mL infusion Continuous     QUEtiapine tablet 50 mg Q6H PRN    senna-docusate 8.6-50 mg per tablet 1 tablet BID    senna-docusate 8.6-50 mg per tablet 2 tablet BID PRN    sodium chloride 0.9% flush 10 mL PRN    sodium phosphate 20.01 mmol in dextrose 5 % (D5W) 250 mL IVPB PRN    sodium phosphate 30 mmol in dextrose 5 % (D5W) 250 mL IVPB PRN    sodium phosphate 39.99 mmol in dextrose 5 % (D5W) 250 mL IVPB PRN    vasopressin (PITRESSIN) 0.2 Units/mL in dextrose 5 % (D5W) 100 mL infusion Continuous    warfarin (COUMADIN) tablet 2 mg Daily       Objective:     Vital Signs (Most Recent):  Temp: 98.8 °F (37.1 °C) (12/06/23 2315)  Pulse: 86 (12/07/23 0630)  Resp: (!) 28 (12/06/23 0500)  BP: (!) 80/0 (12/07/23 0300)  SpO2: 100 % (12/07/23 0630) Vital Signs (24h Range):  Temp:  [97.6 °F (36.4 °C)-98.8 °F (37.1 °C)] 98.8 °F (37.1 °C)  Pulse:  [] 86  SpO2:  [77 %-100 %] 100 %  BP: (66-82)/(0) 80/0  Arterial Line BP: (61-92)/(48-73) 80/65     Weight: 95 kg (209 lb 7 oz) (12/07/23 0501)  Body mass index is 28.4 kg/m².  Body surface area is 2.2 meters squared.    I/O last 3 completed shifts:  In: 4128.3 [I.V.:3853.3; NG/GT:275]  Out: 6131 [Urine:118; Drains:200; Other:5628; Chest Tube:185]     Physical Exam  Vitals and nursing note reviewed.   Constitutional:       General: He is not in acute distress.     Appearance: He is ill-appearing. He is not diaphoretic.      Interventions: He is sedated and intubated.   HENT:      Head: Normocephalic and atraumatic.      Right Ear: External ear normal.      Left Ear: External ear normal.      Nose:      Comments: NG tube to left nostril.     Mouth/Throat:      Comments: ET tube in place.  Eyes:      General: No scleral icterus.        Right eye: No discharge.         Left eye: No discharge.      Conjunctiva/sclera: Conjunctivae normal.   Neck:      Comments: Trialysis catheter to left internal jugular vein. [place] Siva catheter in place.  Cardiovascular:      Rate and Rhythm: Normal rate.      Heart sounds:  Murmur heard.      Systolic murmur is present.      No friction rub. No gallop.   Pulmonary:      Effort: Tachypnea present. He is intubated.      Breath sounds: Rales present. No wheezing.      Comments: Bibasilar crackles appreciated.   Chest:      Comments: Two chest tubes in place with LVAD present. Chest remains open at this time.  Abdominal:      General: Bowel sounds are normal. There is no distension.      Palpations: Abdomen is soft.   Genitourinary:     Comments: [place] catheter in place.  Musculoskeletal:      Cervical back: Neck supple.      Right lower leg: No edema.      Left lower leg: No edema.   Skin:     General: Skin is warm and dry.      Coloration: Skin is not jaundiced.   Neurological:      Mental Status: He is confused.      Comments: Unable to fully assess.   Psychiatric:      Comments: Unable to fully assess at this time.          Significant Labs:  ABGs:   Recent Labs   Lab 12/07/23 0340   PH 7.372   PCO2 27.2*   HCO3 15.8*   POCSATURATED 100   BE -9*       BMP:   Recent Labs   Lab 12/07/23 0417   *      K 5.1      CO2 15*   BUN 22   CREATININE 1.5*   CALCIUM 8.4*   MG 2.4       CBC:   Recent Labs   Lab 12/07/23 0417   WBC 12.25   RBC 2.35*   HGB 6.8*   HCT 20.0*      MCV 85   MCH 28.9   MCHC 34.0       CMP:   Recent Labs   Lab 12/07/23 0417   *   CALCIUM 8.4*   ALBUMIN 2.2*   PROT 6.1      K 5.1   CO2 15*      BUN 22   CREATININE 1.5*   ALKPHOS 129   ALT 33   AST 79*   BILITOT 0.7       Coagulation:   Recent Labs   Lab 12/07/23 0417   INR 1.1   APTT 33.6*       LFTs:   Recent Labs   Lab 12/07/23 0417   ALT 33   AST 79*   ALKPHOS 129   BILITOT 0.7   PROT 6.1   ALBUMIN 2.2*       Microbiology Results (last 7 days)       Procedure Component Value Units Date/Time    Direct AFB stain [[id number]] Collected: 12/06/23 0019    Order Status: Completed Specimen: Body Fluid from Lung, Right Updated: 12/06/23 3777     Direct Acid Fast No acid fast  bacilli seen.    Narrative:      Bronchial Wash    Culture, Respiratory [[id number]] Collected: 12/06/23 1531    Order Status: Completed Specimen: Respiratory from Bronchial Wash Updated: 12/06/23 2204     Gram Stain (Respiratory) Moderate WBC's     Gram Stain (Respiratory) Rare Gram negative rods    Narrative:      Bronchial Wash    AFB Culture & Smear [[id number]] Collected: 12/06/23 1532    Order Status: Sent Specimen: Body Fluid from Lung, Right Updated: 12/06/23 1720    Fungus culture [[id number]] Collected: 12/06/23 1532    Order Status: Sent Specimen: Body Fluid from Lung, Right Updated: 12/06/23 1719    Gram stain [[id number]] Collected: 12/06/23 1532    Order Status: Canceled Specimen: Body Fluid from Lung, Right     Blood culture [[id number]] Collected: 11/28/23 1452    Order Status: Completed Specimen: Blood from Peripheral, Upper Arm, Right Updated: 12/03/23 1612     Blood Culture, Routine No growth after 5 days.    Blood culture [[id number]] Collected: 11/28/23 1450    Order Status: Completed Specimen: Blood from Peripheral, Forearm, Right Updated: 12/03/23 1612     Blood Culture, Routine No growth after 5 days.          Specimen (24h ago, onward)       Start     Ordered    12/06/23 1528  Cytology, Fluid/Wash/Brush  Once        Question Answer Comment   Source: Bronchial Alveolar Lavage (BAL)    Clinical Information: intubated    Specific Site: Bronchus    Other Requests: n/a    Release to patient Immediate        12/06/23 1529                  Significant Imaging:  I have reviewed all imagining in the last 24 hours.

## 2023-12-07 NOTE — PROGRESS NOTES
[facility]  Nephrology  Progress Note    Patient Name: Radha Abbott  MRN: [id number]  Admission Date: 11/9/2023  Hospital Length of Stay: 28 days  Attending Provider: Yuri Washington MD   Primary Care Physician: Vasu Kong MD  Principal Problem:Acute on chronic combined systolic and diastolic CHF, NYHA class 4    Subjective:     HPI: Mr. Abbott is a 63-year-old man with ischemic cardiomyopathy with most recent TTE showing EF 10 -15% and G3DD s/p [employer] ICM placement on chronic dobutamine infusion, CAD s/p x3 vessel CABG back in 2009, type 2 diabetes mellitus (most recent hemoglobin A1c 7.6%), hypertension, HLD, history of ventricular fibrillation for which he is on amiodarone and CKD IIIb (baseline creatinine ~2-2.2) who was initially admitted on 11/9 with heart failure exacerbation and hypervolemia for which he was managed with inotrope with dobutamine in addition to IV diuresis. He ultimately underwent IABP placed on 11/21 for mechanical hemodynamic support and subsequently underwent LVAD placement on 12/1. His renal function appears to be mostly stable with IVÁN and creatinine in mid 2s to 3s in addition he is still making urine with Lasix infusion however on 12/5 Nephrology consulted given concern for uremia with BUN 72.     Interval History: Patient seen and examined this AM. Wife at bedside. Intubated yesterday for respiratory distress. Remains encephalopathic for which he is to undergo CT head. Tolerating Prismax/CRRT. Afebrile with pulse ranging from 90-80s bpm. Systolic blood pressures ranging from 90-80s mmHg via arterial line on epinephrine 0.04 (in addition to dobutamine at 5 for ionotropic support). He is saturating 100% on FiO2 50% and PEEP 5 mmHg with no documented UOP in the last 24 hours. Net negative ~1.53 liters. Serum potassium 5.1 and bicarbonate 15. Will increase clearance to 30 mL/kg/hr.    Review of patient's allergies indicates:  No Known Allergies  Current  Facility-Administered Medications   Medication Frequency    0.9%  NaCl infusion Continuous    acetaminophen tablet 650 mg Q6H PRN    albumin human 5% bottle 25 g PRN    albuterol sulfate nebulizer solution 2.5 mg Q4H PRN    amiodarone tablet 400 mg Daily    atorvastatin tablet 40 mg Daily    bisacodyL suppository 10 mg Daily PRN    dextrose 10% bolus 125 mL 125 mL PRN    dextrose 10% bolus 250 mL 250 mL PRN    DOBUtamine 1000 mg in D5W 250 mL infusion Continuous    EPINEPHrine 5 mg in dextrose 5% 250 mL infusion (premix) Continuous    ferrous gluconate tablet 324 mg Daily with breakfast    heparin 25,000 units in dextrose 5% 250 mL (100 units/mL) infusion (heparin infusion - NO NOMOGRAM) Continuous    magnesium hydroxide 400 mg/5 ml suspension 2,400 mg Daily PRN    magnesium sulfate 2g in water 50mL IVPB (premix) PRN    midodrine tablet 5 mg BID    nitric oxide gas Gas 10 ppm Continuous    NORepinephrine 4 mg in dextrose 5% 250 mL infusion (premix) Continuous    pantoprazole injection 40 mg BID    polyethylene glycol packet 17 g Daily    potassium chloride 20 mEq in 100 mL IVPB (FOR CENTRAL LINE ADMINISTRATION ONLY) PRN    potassium chloride 20 mEq in 100 mL IVPB (FOR CENTRAL LINE ADMINISTRATION ONLY) PRN    propofol (DIPRIVAN) 10 mg/mL infusion Continuous    QUEtiapine tablet 50 mg Q6H PRN    senna-docusate 8.6-50 mg per tablet 1 tablet BID    senna-docusate 8.6-50 mg per tablet 2 tablet BID PRN    sodium chloride 0.9% flush 10 mL PRN    sodium phosphate 20.01 mmol in dextrose 5 % (D5W) 250 mL IVPB PRN    sodium phosphate 30 mmol in dextrose 5 % (D5W) 250 mL IVPB PRN    sodium phosphate 39.99 mmol in dextrose 5 % (D5W) 250 mL IVPB PRN    vasopressin (PITRESSIN) 0.2 Units/mL in dextrose 5 % (D5W) 100 mL infusion Continuous    warfarin (COUMADIN) tablet 2 mg Daily       Objective:     Vital Signs (Most Recent):  Temp: 98.8 °F (37.1 °C) (12/06/23 2315)  Pulse: 86 (12/07/23 0630)  Resp: (!) 28 (12/06/23 0500)  BP: (!)  80/0 (12/07/23 0300)  SpO2: 100 % (12/07/23 0630) Vital Signs (24h Range):  Temp:  [97.6 °F (36.4 °C)-98.8 °F (37.1 °C)] 98.8 °F (37.1 °C)  Pulse:  [] 86  SpO2:  [77 %-100 %] 100 %  BP: (66-82)/(0) 80/0  Arterial Line BP: (61-92)/(48-73) 80/65     Weight: 95 kg (209 lb 7 oz) (12/07/23 0501)  Body mass index is 28.4 kg/m².  Body surface area is 2.2 meters squared.    I/O last 3 completed shifts:  In: 4128.3 [I.V.:3853.3; NG/GT:275]  Out: 6131 [Urine:118; Drains:200; Other:5628; Chest Tube:185]    Physical Exam  Vitals and nursing note reviewed.   Constitutional:       General: He is not in acute distress.     Appearance: He is ill-appearing. He is not diaphoretic.      Interventions: He is sedated and intubated.   HENT:      Head: Normocephalic and atraumatic.      Right Ear: External ear normal.      Left Ear: External ear normal.      Nose:      Comments: NG tube to left nostril.     Mouth/Throat:      Comments: ET tube in place.  Eyes:      General: No scleral icterus.        Right eye: No discharge.         Left eye: No discharge.      Conjunctiva/sclera: Conjunctivae normal.   Neck:      Comments: Trialysis catheter to left internal jugular vein. Newton Siva catheter in place.  Cardiovascular:      Rate and Rhythm: Normal rate.      Heart sounds: Murmur heard.      Systolic murmur is present.      No friction rub. No gallop.   Pulmonary:      Effort: Tachypnea present. He is intubated.      Breath sounds: Rales present. No wheezing.      Comments: Bibasilar crackles appreciated.   Chest:      Comments: Two chest tubes in place with LVAD present. Chest remains open at this time.  Abdominal:      General: Bowel sounds are normal. There is no distension.      Palpations: Abdomen is soft.   Genitourinary:     Comments: Hampton catheter in place.  Musculoskeletal:      Cervical back: Neck supple.      Right lower leg: No edema.      Left lower leg: No edema.   Skin:     General: Skin is warm and dry.       Coloration: Skin is not jaundiced.   Neurological:      Mental Status: He is confused.      Comments: Unable to fully assess.   Psychiatric:      Comments: Unable to fully assess at this time.          Significant Labs:  ABGs:   Recent Labs   Lab 12/07/23  0340   PH 7.372   PCO2 27.2*   HCO3 15.8*   POCSATURATED 100   BE -9*       BMP:   Recent Labs   Lab 12/07/23 0417   *      K 5.1      CO2 15*   BUN 22   CREATININE 1.5*   CALCIUM 8.4*   MG 2.4       CBC:   Recent Labs   Lab 12/07/23 0417   WBC 12.25   RBC 2.35*   HGB 6.8*   HCT 20.0*      MCV 85   MCH 28.9   MCHC 34.0       CMP:   Recent Labs   Lab 12/07/23 0417   *   CALCIUM 8.4*   ALBUMIN 2.2*   PROT 6.1      K 5.1   CO2 15*      BUN 22   CREATININE 1.5*   ALKPHOS 129   ALT 33   AST 79*   BILITOT 0.7       Coagulation:   Recent Labs   Lab 12/07/23 0417   INR 1.1   APTT 33.6*       LFTs:   Recent Labs   Lab 12/07/23 0417   ALT 33   AST 79*   ALKPHOS 129   BILITOT 0.7   PROT 6.1   ALBUMIN 2.2*       Microbiology Results (last 7 days)       Procedure Component Value Units Date/Time    Direct AFB stain [[id number]] Collected: 12/06/23 1532    Order Status: Completed Specimen: Body Fluid from Lung, Right Updated: 12/06/23 2257     Direct Acid Fast No acid fast bacilli seen.    Narrative:      Bronchial Wash    Culture, Respiratory [[id number]] Collected: 12/06/23 1531    Order Status: Completed Specimen: Respiratory from Bronchial Wash Updated: 12/06/23 2204     Gram Stain (Respiratory) Moderate WBC's     Gram Stain (Respiratory) Rare Gram negative rods    Narrative:      Bronchial Wash    AFB Culture & Smear [[id number]] Collected: 12/06/23 1532    Order Status: Sent Specimen: Body Fluid from Lung, Right Updated: 12/06/23 1720    Fungus culture [[id number]] Collected: 12/06/23 1532    Order Status: Sent Specimen: Body Fluid from Lung, Right Updated: 12/06/23 1719    Gram stain [[id number]] Collected: 12/06/23 1532     Order Status: Canceled Specimen: Body Fluid from Lung, Right     Blood culture [[id number]] Collected: 11/28/23 1452    Order Status: Completed Specimen: Blood from Peripheral, Upper Arm, Right Updated: 12/03/23 1612     Blood Culture, Routine No growth after 5 days.    Blood culture [[id number]] Collected: 11/28/23 1450    Order Status: Completed Specimen: Blood from Peripheral, Forearm, Right Updated: 12/03/23 1612     Blood Culture, Routine No growth after 5 days.          Specimen (24h ago, onward)       Start     Ordered    12/06/23 1528  Cytology, Fluid/Wash/Brush  Once        Question Answer Comment   Source: Bronchial Alveolar Lavage (BAL)    Clinical Information: intubated    Specific Site: Bronchus    Other Requests: n/a    Release to patient Immediate        12/06/23 1529                  Significant Imaging:  I have reviewed all imagining in the last 24 hours.  Assessment/Plan:     Cardiac/Vascular  * Acute on chronic combined systolic and diastolic CHF, NYHA class 4  LVAD (left ventricular assist device) present  Pre-transplant evaluation for heart transplant  Patient is identified as having Combined Systolic and Diastolic heart failure that is Acute on chronic. CHF is currently uncontrolled due to JVD, Rales/crackles on pulmonary exam, and Pulmonary edema/pleural effusion on CXR. Latest ECHO performed and demonstrates- Results for orders placed during the hospital encounter of 11/09/23    Echo    Interpretation Summary    Left Ventricle: The left ventricle is moderately dilated. Mildly increased ventricular mass. Normal wall thickness. There is eccentric hypertrophy. Severe global hyperkinesis present. There is severely reduced systolic function with a visually estimated ejection fraction of 10 -15%. Grade III diastolic dysfunction.    Right Ventricle: Normal right ventricular cavity size. Systolic function is normal. Pacemaker lead present in the ventricle.    Left Atrium: Left atrium is severely  dilated.    Mitral Valve: There is annular and bileaflet tethering. There is moderate regurgitation with a centrally directed jet.    Tricuspid Valve: There is mild regurgitation with a centrally directed jet.    Pulmonary Artery: The estimated pulmonary artery systolic pressure is 41 mmHg.    IVC/SVC: Normal venous pressure at 3 mmHg.    last BNP reviewed- and noted below   Recent Labs   Lab 12/06/23  0410   BNP 1,679*       - management per primary team  - LVAD for mechanical support in addition to dobutamine and epinephrine for ionotropic support  - UF with CRRT, 50 mL/hr for goal of 1 liter negative in next 24 hours      Renal/  Acute renal failure with acute tubular necrosis superimposed on stage 3b chronic kidney disease  - suspect acute tubular injury secondary to hypotension/cardiogenic shock likely compounded by intra-arterial contrast and anemia with urinary sediment with granular casts  - continue CRRT/Prismax but increased clearance from 25 to 30 mL/kg/hr with Phoxillium BK4/2.5 for metabolic clearance and volume management (goal to keep net negative ~1 liter in the next 24 hours), consent obtained via wife at bedside  - Q8H RFPs and magnesium levels per CRRT protocol   - renal diet/tube feeds when not NPO, volume restriction per primary team  - strict I/O's and daily weights  - keep MAP > 65 mmHg  - renally all dose medications to eGFR  - avoid nephrotoxic agents wean feasible (i.e. NSAIDs, intra-arterial contrast, supra-therapeutic vancomycin levels, etc.)    Thank you for your consult. I will follow-up with patient. Please contact us if you have any additional questions.    Henok Ayon MD  Nephrology  [facility]

## 2023-12-07 NOTE — ASSESSMENT & PLAN NOTE
BG goal 140-180. TF @ 10 mL/hr.     - patient receiving TF @ 10 mL/hr- BG remaining WNL without insulin coverage at this time- will continue LDC (150/50) SSI q4hr as needed and continue to monitor if scheduled Novolog required.   - BG checks q4hr while on TF/NPO   - Novolog (aspart) insulin prn for BG excursions LDC (150/50)   - Hypoglycemia protocol in place    ** Please notify Endocrine for any change and/or advance in diet**  ** Please call Endocrine for any BG related issues **    Discharge Planning:   TBD. Please notify endocrinology prior to discharge.

## 2023-12-07 NOTE — CONSULTS
[facility] - Surgical Intensive Care  Infectious Disease  Consult Note    Patient Name: Radha Abbott  MRN: [id number]  Admission Date: 11/9/2023  Hospital Length of Stay: 28 days  Attending Physician: Yuri Washington MD  Primary Care Provider: Vasu Kong MD     Isolation Status: No active isolations    Patient information was obtained from relative(s), past medical records, ER records, and primary team.      Inpatient consult to Infectious Diseases  Consult performed by: Albert Burt MD  Consult ordered by: Melanie Padilla MD        Assessment/Plan:     Pulmonary  Abnormal CXR  62 yo man s/p destination therapy left ventricular assist device placement. Postoperatively the chest was left open due to diffuse coagulopathy and RV dysfunction.  He did well post-operatively and underwent washout and closure on 12/4. The patient was extubated but had to be reintubated. A BAL was performed: Gram stain with GNB, culture is NGTD. Given his recent history, I feel more comfortable over treating him rather than under treating him. Discussed with Dr. Washington.    Recommendations  Start empiric Zosyn  Await final respiratory culture results  Will de-escalate quickly as allowed by patient's clinical response to therapy       Thank you for your consult. I will follow-up with patient. Please contact us if you have any additional questions.    Albert Burt MD  Infectious Disease  [facility] - Surgical Intensive Care    Subjective:     Principal Problem: Acute on chronic combined systolic and diastolic CHF, NYHA class 4    HPI:  Mr. Abbott is a 63-year-old man who underwent destination therapy left ventricular assist device placement.  Postoperatively the chest was left open due to diffuse coagulopathy in RV dysfunction.  He did well post-operatively and underwent washout and closure on 12/4. The patient was extubated but had to be reintubated. A BAL was performed: Gram stain with GNB, culture is NGTD. ID is consulted  for BAL results. The patient is agitated but quiets down with reassurance. No fever or chills. Ecgo done this am - results pending. Family at bedside.       Past Medical History:   Diagnosis Date    CAD (coronary artery disease)     Diabetes mellitus     HFrEF (heart failure with reduced ejection fraction)     ICD (implantable cardioverter-defibrillator) in place     MI, old        Past Surgical History:   Procedure Laterality Date    ANGIOPLASTY-VENOUS ARTERY Right 12/1/2023    Procedure: ANGIOPLASTY-VENOUS ARTERY, RIGHT FEMORAL;  Surgeon: Yuri Washington MD;  Location: [facility] OR [address] [place] [address];  Service: Cardiovascular;  Laterality: Right;    AORTIC VALVULOPLASTY N/A 12/1/2023    Procedure: REPAIR, AORTIC VALVE;  Surgeon: Yuri Washington MD;  Location: [address];  Service: Cardiovascular;  Laterality: N/A;    CARDIAC SURGERY      CLOSURE N/A 12/1/2023    Procedure: CLOSURE, TEMPORARY;  Surgeon: Yuri Washington MD;  [facility];  Service: Cardiovascular;  Laterality: N/A;    DRAINAGE OF PLEURAL EFFUSION  12/4/2023    Procedure: DRAINAGE, PLEURAL EFFUSION;  Surgeon: Yuri Washington MD;  Location: [facility] OR [address] [place] [address];  Service: Cardiovascular;;    INSERTION OF GRAFT TO PERICARDIUM  12/4/2023    Procedure: INSERTION, GRAFT, PERICARDIUM;  Surgeon: Yuri Washington MD;  [facility];  Service: Cardiovascular;;    INSERTION OF INTRA-AORTIC BALLOON ASSIST DEVICE Right 11/21/2023    Procedure: INSERTION, INTRA-AORTIC BALLOON PUMP;  Surgeon: Finn Cohn MD;  Location: [facility] CATH LAB;  Service: Cardiology;  Laterality: Right;    LEFT VENTRICULAR ASSIST DEVICE Left 12/1/2023    Procedure: INSERTION-LEFT VENTRICULAR ASSIST DEVICE;  Surgeon: Yuri Washington MD;  Location: [address];  Service: Cardiovascular;  Laterality: Left;  REDO STERNOTOMY - REDO SAW NEEDED FOR CASE    LYSIS OF ADHESIONS  12/1/2023    Procedure: LYSIS, ADHESIONS;  Surgeon: Yuri Washington MD;  Location: [address];   Service: Cardiovascular;;    PLACEMENT OF SWAN ROLANDO CATHETER WITH IMAGING GUIDANCE  11/20/2023    Procedure: INSERTION, CATHETER, SWAN-ROLANDO, WITH IMAGING GUIDANCE;  Surgeon: Sajan Hurley MD;  Location: [facility] CATH LAB;  Service: Cardiology;;    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 12/1/2023    Procedure: REPAIR, ANEURYSM, ARTERY, FEMORAL;  Surgeon: Yuri Washington MD;  [facility];  Service: Cardiovascular;  Laterality: Right;  Right Femoral Artery Repair    RIGHT HEART CATHETERIZATION Right 10/10/2023    Procedure: INSERTION, CATHETER, RIGHT HEART;  Surgeon: Bin Gandhi MD;  Location: [facility] CATH LAB;  Service: Cardiology;  Laterality: Right;    RIGHT HEART CATHETERIZATION Right 10/13/2023    Procedure: INSERTION, CATHETER, RIGHT HEART;  Surgeon: Walter Mcintyre MD;  Location: [facility] CATH LAB;  Service: Cardiology;  Laterality: Right;    RIGHT HEART CATHETERIZATION  11/13/2023    RIGHT HEART CATHETERIZATION Right 11/13/2023    Procedure: INSERTION, CATHETER, RIGHT HEART;  Surgeon: Juventino Bermudez Jr., MD;  Location: [facility] CATH LAB;  Service: Cardiology;  Laterality: Right;    RIGHT HEART CATHETERIZATION Right 11/20/2023    Procedure: INSERTION, CATHETER, RIGHT HEART;  Surgeon: Sajan Hurley MD;  Location: [facility] CATH LAB;  Service: Cardiology;  Laterality: Right;    STERNAL WOUND CLOSURE N/A 12/4/2023    Procedure: CLOSURE, WOUND, STERNUM;  Surgeon: Yuri Washington MD;  [facility];  Service: Cardiovascular;  Laterality: N/A;    STERNOTOMY N/A 12/1/2023    Procedure: STERNOTOMY, REDO;  Surgeon: Yuri Washington MD;  Location: [facility] OR [facility];  Service: Cardiovascular;  Laterality: N/A;    VALVULOPLASTY, MITRAL VALVE N/A 12/1/2023    Procedure: VALVULOPLASTY, MITRAL VALVE;  Surgeon: Yuri Washington MD;  Location: [facility] OR [facility];  Service: Cardiovascular;  Laterality: N/A;       Review of patient's allergies indicates:  No Known  Allergies    Medications:  Medications Prior to Admission   Medication Sig    amiodarone (PACERONE) 400 MG tablet Take 1 tablet (400 mg total) by mouth once daily.    apixaban (ELIQUIS) 5 mg Tab Take 1 tablet (5 mg total) by mouth 2 (two) times daily.    aspirin (ECOTRIN) 81 MG EC tablet Take 81 mg by mouth once daily.    atorvastatin (LIPITOR) 40 MG tablet Take 40 mg by mouth.    DOBUTamine (DOBUTREX) 1,000 mg/250 mL (4,000 mcg/mL) infusion Inject 389.5 mcg/min into the vein continuous.    fish oil-omega-3 fatty acids 300-1,000 mg capsule Take 2 g by mouth once daily.    furosemide (LASIX) 40 MG tablet Take 1 tablet (40 mg total) by mouth 2 (two) times daily.    gabapentin (NEURONTIN) 300 MG capsule Take 300 mg by mouth nightly as needed.    multivitamin capsule Take 1 capsule by mouth once daily.    pantoprazole (PROTONIX) 40 MG tablet Take 1 tablet (40 mg total) by mouth before breakfast.    potassium chloride SA (K-DUR,KLOR-CON) 20 MEQ tablet Take 1 tablet (20 mEq total) by mouth once daily.    sacubitriL-valsartan (ENTRESTO) 24-26 mg per tablet Take 1 tablet by mouth 2 (two) times daily.    metOLazone (ZAROXOLYN) 2.5 MG tablet Take 1 tablet (2.5 mg total) by mouth once daily. Thursday 11/9 and Saturday 11/10    torsemide (DEMADEX) 20 MG Tab Take 2 tablets (40 mg total) by mouth 2 (two) times a day.     Antibiotics (From admission, onward)      Start     Stop Route Frequency Ordered    11/30/23 2130  mupirocin 2 % ointment 1 g         12/01/23 2059 Nasl 2 times daily 11/30/23 2128 11/30/23 2130  rifAMpin (RIFADIN) 600 mg in dextrose 5 % (D5W) 100 mL IVPB  (Med - Anti-infectives - NO penicillin allergy or mild to moderate penicillin allergy)         12/01/23 0929 IR Once pre-op 11/30/23 2128          Antifungals (From admission, onward)      None          Antivirals (From admission, onward)      None             Immunization History   Administered Date(s) Administered    COVID-19, MRNA, LN-S, PF (MODERNA FULL  0.5 ML DOSE) 03/18/2021, 04/15/2021    Hepatitis A, Adult 11/17/2023    Hepatitis B (recombinant) Adjuvanted, 2 dose 11/17/2023       Family History    None       Social History     Socioeconomic History    Marital status:    Tobacco Use    Smoking status: Every Day     Current packs/day: 0.50     Types: Cigarettes   Substance and Sexual Activity    Alcohol use: Yes     Comment: rarely    Drug use: No     Social Determinants of Health     Financial Resource Strain: Low Risk  (10/27/2023)    Overall Financial Resource Strain (CARDIA)     Difficulty of Paying Living Expenses: Not hard at all   Food Insecurity: No Food Insecurity (10/27/2023)    Hunger Vital Sign     Worried About Running Out of Food in the Last Year: Never true     Ran Out of Food in the Last Year: Never true   Transportation Needs: No Transportation Needs (10/27/2023)    PRAPARE - Transportation     Lack of Transportation (Medical): No     Lack of Transportation (Non-Medical): No   Physical Activity: Inactive (10/27/2023)    Exercise Vital Sign     Days of Exercise per Week: 0 days     Minutes of Exercise per Session: 0 min   Stress: No Stress Concern Present (10/27/2023)    [employer] - Occupational Stress Questionnaire     Feeling of Stress : Not at all   Social Connections: Moderately Integrated (10/27/2023)    Social Connection and Isolation Panel [NHANES]     Frequency of Communication with Friends and Family: More than three times a week     Frequency of Social Gatherings with Friends and Family: More than three times a week     Attends [employer] Services: More than 4 times per year     Active Member of Clubs or Organizations: No     Attends Club or Organization Meetings: Never     Marital Status:    Housing Stability: Low Risk  (10/27/2023)    Housing Stability Vital Sign     Unable to Pay for Housing in the Last Year: No     Number of Places Lived in the Last Year: 1     Unstable Housing in the Last  "Year: No     Review of Systems   Unable to perform ROS: Intubated     Objective:     Vital Signs (Most Recent):  Temp: 99 °F (37.2 °C) (12/07/23 0700)  Pulse: 97 (12/07/23 1400)  Resp: (!) 28 (12/06/23 0500)  BP: (!) 82/0 (12/07/23 0700)  SpO2: 100 % (12/07/23 1400) Vital Signs (24h Range):  Temp:  [97.8 °F (36.6 °C)-99 °F (37.2 °C)] 99 °F (37.2 °C)  Pulse:  [] 97  SpO2:  [77 %-100 %] 100 %  BP: (74-82)/(0) 82/0  Arterial Line BP: (67-92)/(48-73) 78/59     Weight: 95 kg (209 lb 7 oz)  Body mass index is 28.4 kg/m².    Estimated Creatinine Clearance: 61.9 mL/min (A) (based on SCr of 1.5 mg/dL (H)).     Physical Exam  Vitals and nursing note reviewed.   Constitutional:       General: He is not in acute distress.     Appearance: Normal appearance. He is diaphoretic. He is not ill-appearing or toxic-appearing.   HENT:      Head: Normocephalic.      Right Ear: External ear normal.      Left Ear: External ear normal.      Mouth/Throat:      Mouth: Mucous membranes are moist.   Eyes:      Pupils: Pupils are equal, round, and reactive to light.   Cardiovascular:      Rate and Rhythm: Normal rate and regular rhythm.      Heart sounds: No murmur heard.     Comments: humming  Abdominal:      Tenderness: There is no guarding or rebound.   Skin:     Findings: No erythema or rash.   Neurological:      General: No focal deficit present.      Mental Status: He is alert.     Multiple lines     Significant Labs: Blood Culture:   Recent Labs   Lab 11/28/23  1450 11/28/23  1452   LABBLOO No growth after 5 days. No growth after 5 days.     CBC:   Recent Labs   Lab 12/06/23  0537 12/06/23  1135 12/06/23  1616 12/07/23  0417 12/07/23  1206   WBC 12.24  --   --  12.25 10.31   HGB 7.1*  --   --  6.8* 7.0*   HCT 21.0*   < > 19* 20.0* 20.6*     --   --  236 249    < > = values in this interval not displayed.     Procalcitonin: No results for input(s): "PROCAL" in the last 48 hours.  Respiratory Culture:   Recent Labs   Lab " "12/06/23  1531   GSRESP Moderate WBC's  Rare Gram negative rods   RESPIRATORYC No Growth     Urine Culture: No results for input(s): "LABURIN" in the last 4320 hours.  Wound Culture: No results for input(s): "LABAERO" in the last 4320 hours.    Significant Imaging: I have reviewed all pertinent imaging results/findings within the past 24 hours.              "

## 2023-12-07 NOTE — ASSESSMENT & PLAN NOTE
Lab Results   Component Value Date    CREATININE 1.5 (H) 12/07/2023     Avoid insulin stacking  Titrate insulin slowly

## 2023-12-07 NOTE — PROGRESS NOTES
12/07/2023  Deni Frye    Current provider:  Yuri Washington MD    Device interrogation:      12/7/2023     8:00 AM 12/7/2023     7:00 AM 12/7/2023     6:00 AM 12/7/2023     5:00 AM 12/7/2023     4:00 AM 12/7/2023     3:00 AM 12/7/2023     2:00 AM   TXP LVAD INTERROGATIONS   Type HeartMate3 HeartMate3 HeartMate3 HeartMate3 HeartMate3 HeartMate3 HeartMate3   Flow 3.6 3.7 3.8 3.7 3.9 3.8 4   Speed 4800 4800 4800 4800 4800 4800 4800   PI 4.5 5.1 4.1 4.2 3.8 4.8 3.7   Power ([place]) 3.1 3.2 3.1 3.1 3.1 3.1 3.1   LSL 4400 4400 4400 4400 4400 4400 4400   Pulsatility Intermittent pulse Intermittent pulse Intermittent pulse Intermittent pulse Intermittent pulse Intermittent pulse Intermittent pulse          Rounded on [facility] to ensure all mechanical assist device settings (IABP or VAD) were appropriate and all parameters were within limits.  I was able to ensure all back up equipment was present, the staff had no issues, and the Perfusion Department daily rounding was complete.      For implantable VADs: Interrogation of Ventricular assist device was performed with analysis of device parameters and review of device function. I have personally reviewed the interrogation findings and agree with findings as stated.     In emergency, the nursing units have been notified to contact the perfusion department either by:  Calling [phone] from 630am to 4pm Mon thru Fri, utilizing the On-Call Finder functionality of Epic and searching for Perfusion, or by contacting the hospital  from 4pm to 630am and on weekends and asking to speak with the perfusionist on call.    9:23 AM

## 2023-12-07 NOTE — PLAN OF CARE
Nephrology Care Update    Intake/Output Summary (Last 24 hours) at 12/7/2023 1505  Last data filed at 12/7/2023 1400  Gross per 24 hour   Intake 1254.34 ml   Output 2144 ml   Net -889.66 ml       Vitals:    12/07/23 1330 12/07/23 1345 12/07/23 1400 12/07/23 1415   BP:       BP Location:       Patient Position:       Pulse: 99 98 97 94   Resp:       Temp:       TempSrc:       SpO2: 100% 100% 100% 100%   Weight:       Height:           Recent Labs   Lab 12/06/23  1613 12/06/23  1819 12/07/23  0417 12/07/23  0758 12/07/23  1206 12/07/23  1413     --  139  --   --  140   K 5.0   < > 5.1 4.7 4.7 4.3     --  105  --   --  105   CO2 18*  --  15*  --   --  16*   BUN 20  --  22  --   --  24*   CREATININE 1.4  --  1.5*  --   --  1.6*   CALCIUM 7.9*  --  8.4*  --   --  8.2*   PHOS 4.4  --  4.6*  --   --  4.0    < > = values in this interval not displayed.     A1c - 10/12/2023 7.6     Reviewed most recent set of laboratory studies which was notable for bicarbonate 16. Will increase clearance from 30 mL/kg/hr to 40 mL/kg/hr. Bicarbonate is 32 in Phoxillium BK4/2.5. No other related issues identified. Please call nephrology as needed. We will continue to follow.    Henok Ayon MD  Nephrology

## 2023-12-07 NOTE — ASSESSMENT & PLAN NOTE
Pt with known ICM with an EF of 25% on home  presented with decompensated HF.  Not in cardiogenic shock    RHC 11/14: RA 14, PA 70/35, PCWP 32, CO 4.1, CI 2.1.   Repeat RHC 11/20 RA RA  20  PA 60/29 (39)  PCWP 29    CO  4.6 l/min  CI  2.3 l/min/m2  2D echo 11/21 showed EF 15-20%, mild RV dysfunction, mild MR, LVEDD 6.9     Repeat on 11/24 showed EF 10-15%, RV mildly reduced, mild-mod MR, LVEDD 6.42  - Home  gtt at 5  - home GDMT: entresto 24/26mg BID (on hold 2/2 IVÁN), Hydralazine 25 q8h - held prior to LVAD implant   - home diuretics: Was taking lasix 40 bid  - Bridged to LVAD with IABP s/p HM3 implant 12/1        -PT/OT and nutrition   -s/p LVAD (12/1) Speed increased on 12/7 to 4900 RPMs  - Remains on  5, epi 0.04, Ketty 10 ppm  - CTS primary  -f/u echo tomorrow

## 2023-12-07 NOTE — SUBJECTIVE & OBJECTIVE
"Interval HPI:   No acute events overnight. Patient in room 97985/49357 A. Blood glucose stable. BG at goal on current insulin regimen (SSI ). Steroid use- none.   3 Days Post-Op  Renal function- Abnormal - cr 1.5   Vasopressors-  Epinephrine     Diet NPO Except for: Medication (per NG tube)     Eating:   NPO  Nausea: No  Hypoglycemia and intervention: No  Fever: No  TPN and/or TF: Yes  If yes, type of TF/TPN and rate: TF @ 10 ml/hr     BP (!) 82/0 (BP Location: Left arm, Patient Position: Lying)   Pulse 91   Temp 99 °F (37.2 °C) (Oral)   Resp (!) 28   Ht 6' (1.829 m)   Wt 95 kg (209 lb 7 oz)   SpO2 100%   BMI 28.40 kg/m²     Labs Reviewed and Include    Recent Labs   Lab 12/07/23  0417 12/07/23  0758   *  --    CALCIUM 8.4*  --    ALBUMIN 2.2*  --    PROT 6.1  --      --    K 5.1 4.7   CO2 15*  --      --    BUN 22  --    CREATININE 1.5*  --    ALKPHOS 129  --    ALT 33  --    AST 79*  --    BILITOT 0.7  --      Lab Results   Component Value Date    WBC 12.25 12/07/2023    HGB 6.8 (L) 12/07/2023    HCT 20.0 (L) 12/07/2023    MCV 85 12/07/2023     12/07/2023     No results for input(s): "TSH", "FREET4" in the last 168 hours.  Lab Results   Component Value Date    HGBA1C 7.6 (H) 10/12/2023       Nutritional status:   Body mass index is 28.4 kg/m².  Lab Results   Component Value Date    ALBUMIN 2.2 (L) 12/07/2023    ALBUMIN 2.1 (L) 12/06/2023    ALBUMIN 2.2 (L) 12/06/2023    ALBUMIN 2.2 (L) 12/06/2023    ALBUMIN 2.2 (L) 12/06/2023     Lab Results   Component Value Date    PREALBUMIN 19 (L) 12/01/2023    PREALBUMIN 27 11/15/2023       Estimated Creatinine Clearance: 61.9 mL/min (A) (based on SCr of 1.5 mg/dL (H)).    Accu-Checks  Recent Labs     12/06/23  0415 12/06/23  0417 12/06/23  0459 12/06/23  0750 12/06/23  0807 12/06/23  1129 12/06/23  1612 12/06/23  1941 12/06/23  1944 12/07/23  0757   POCTGLUCOSE 102 114* 110 228* 134* 120* 133* 248* 155* 144*       Current Medications and/or " Treatments Impacting Glycemic Control  Immunotherapy:    Immunosuppressants       None          Steroids:   Hormones (From admission, onward)      Start     Stop Route Frequency Ordered    12/06/23 1022  vasopressin (PITRESSIN) 0.2 Units/mL in dextrose 5 % (D5W) 100 mL infusion         -- IV Continuous 12/06/23 1023          Pressors:    Autonomic Drugs (From admission, onward)      Start     Stop Route Frequency Ordered    12/07/23 0900  midodrine tablet 5 mg         -- PER NG TUBE 2 times daily 12/06/23 2157    12/06/23 1330  NORepinephrine 4 mg in dextrose 5% 250 mL infusion (premix)        Question Answer Comment   Begin at (in mcg/kg/min): 0.02    Titrate by: (in mcg/kg/min) 0.02    Titrate interval: (in minutes) 5    Titrate to maintain: (MAP or SBP) MAP    Greater than: (in mmHg) 70    Maximum dose: (in mcg/kg/min) 3        -- IV Continuous 12/06/23 1224    12/06/23 1030  EPINEPHrine 5 mg in dextrose 5% 250 mL infusion (premix)         -- IV Continuous 12/06/23 0926          Hyperglycemia/Diabetes Medications:   Antihyperglycemics (From admission, onward)      None

## 2023-12-07 NOTE — ASSESSMENT & PLAN NOTE
Patient is identified as having Combined Systolic and Diastolic heart failure that is Acute on chronic. CHF is currently uncontrolled due to JVD, Rales/crackles on pulmonary exam, and Pulmonary edema/pleural effusion on CXR. Latest ECHO performed and demonstrates- Results for orders placed during the hospital encounter of 11/09/23    Echo    Interpretation Summary    Left Ventricle: The left ventricle is moderately dilated. Mildly increased ventricular mass. Normal wall thickness. There is eccentric hypertrophy. Severe global hyperkinesis present. There is severely reduced systolic function with a visually estimated ejection fraction of 10 -15%. Grade III diastolic dysfunction.    Right Ventricle: Normal right ventricular cavity size. Systolic function is normal. Pacemaker lead present in the ventricle.    Left Atrium: Left atrium is severely dilated.    Mitral Valve: There is annular and bileaflet tethering. There is moderate regurgitation with a centrally directed jet.    Tricuspid Valve: There is mild regurgitation with a centrally directed jet.    Pulmonary Artery: The estimated pulmonary artery systolic pressure is 41 mmHg.    IVC/SVC: Normal venous pressure at 3 mmHg.    last BNP reviewed- and noted below   Recent Labs   Lab 12/06/23  0410   BNP 1,679*       - management per primary team  - LVAD for mechanical support in addition to dobutamine and epinephrine for ionotropic support  - UF with CRRT, 50 mL/hr for goal of 1 liter negative in next 24 hours

## 2023-12-07 NOTE — PROGRESS NOTES
[facility]  Critical Care - Surgery  Progress Note    Patient Name: Radha Abbott  MRN: [id number]  Admission Date: 11/9/2023  Hospital Length of Stay: 28 days  Code Status: Full Code  Attending Provider: Yuri Washington MD  Primary Care Provider: Vasu Kong MD   Principal Problem: Acute on chronic combined systolic and diastolic CHF, NYHA class 4    Subjective:     Hospital/ICU Course:  No notes on file    Interval History/Significant Events: Reintubated yesterday afternoon, bronchoscopy performed with RLL infiltrate seen. off vaso, midodrine decreased to 5 TID, epi 0.04. Tolerating CVVHD, removed 3L over last 24H.    Follow-up For: Procedure(s) (LRB):  CLOSURE, WOUND, STERNUM (N/A)  INSERTION, GRAFT, PERICARDIUM  DRAINAGE, PLEURAL EFFUSION    Post-Operative Day: 3 Days Post-Op    Objective:     Vital Signs (Most Recent):  Temp: 98.8 °F (37.1 °C) (12/06/23 2315)  Pulse: 86 (12/07/23 0630)  Resp: (!) 28 (12/06/23 0500)  BP: (!) 80/0 (12/07/23 0300)  SpO2: 100 % (12/07/23 0630) Vital Signs (24h Range):  Temp:  [97.6 °F (36.4 °C)-98.8 °F (37.1 °C)] 98.8 °F (37.1 °C)  Pulse:  [] 86  SpO2:  [77 %-100 %] 100 %  BP: (66-82)/(0) 80/0  Arterial Line BP: (61-92)/(48-73) 80/65     Weight: 95 kg (209 lb 7 oz)  Body mass index is 28.4 kg/m².      Intake/Output Summary (Last 24 hours) at 12/7/2023 0641  Last data filed at 12/7/2023 0601  Gross per 24 hour   Intake 1905.61 ml   Output 3390 ml   Net -1484.39 ml          Physical Exam  Vitals and nursing note reviewed.   Constitutional:       General: He is not in acute distress.     Appearance: Normal appearance.   HENT:      Head: Normocephalic and atraumatic.      Mouth/Throat:      Mouth: Mucous membranes are moist.      Pharynx: Oropharynx is clear.   Cardiovascular:      Rate and Rhythm: Normal rate and regular rhythm.      Comments: LVAD in place  Pulmonary:      Effort: Pulmonary effort is normal. No respiratory distress.      Breath  sounds: Normal breath sounds.      Comments: Intubated  Vent Mode: A/C  Oxygen Concentration (%):  [] 50  Resp Rate Total:  [18 br/min-58 br/min] 22 br/min  Vt Set:  [420 mL-450 mL] 450 mL  PEEP/CPAP:  [5 cmH20] 5 cmH20  Mean Airway Pressure:  [7.6 cmH20-15 cmH20] 9.3 cmH20     Abdominal:      General: Abdomen is flat. There is no distension.      Palpations: Abdomen is soft.      Tenderness: There is no abdominal tenderness.      Comments: NGT in place   Genitourinary:     Comments: goodman  Musculoskeletal:         General: Normal range of motion.      Right lower leg: No edema.      Left lower leg: No edema.   Skin:     General: Skin is warm and dry.            Vents:  Vent Mode: A/C (12/07/23 0340)  Ventilator Initiated: Yes (12/06/23 1233)  Set Rate: 18 BPM (12/07/23 0340)  Vt Set: 450 mL (12/07/23 0340)  Pressure Support: 5 cmH20 (12/05/23 0930)  PEEP/CPAP: 5 cmH20 (12/07/23 0340)  Oxygen Concentration (%): 50 (12/07/23 0630)  Peak Airway Pressure: 20 cmH20 (12/07/23 0340)  Plateau Pressure: 18 cmH20 (12/07/23 0340)  Total Ve: 10.2 L/m (12/07/23 0340)  Negative Inspiratory Force (cm H2O): 0 (12/07/23 0340)  F/VT Ratio<105 (RSBI): (!) 39.01 (12/04/23 1803)    Lines/Drains/Airways       Peripherally Inserted Central Catheter Line  Duration             PICC Triple Lumen 12/05/23 1208 right basilic 1 day              Central Venous Catheter Line  Duration             Trialysis (Dialysis) Catheter 12/01/23 1530 left internal jugular 5 days              Drain  Duration                  Chest Tube Tube - 1 Right Mediastinal -- days         Chest Tube 12/01/23 Tube - 2 Left Mediastinal 6 days         NG/OG Tube 12/04/23 1730 Left nostril 2 days              Airway  Duration                  Airway - Non-Surgical 12/06/23 1220 Endotracheal Tube <1 day              Arterial Line  Duration             Arterial Line 12/06/23 1526 Left Radial <1 day              Line  Duration                  VAD 12/01/23 1015 Left  ventricular assist device HeartMate 3 5 days              Peripheral Intravenous Line  Duration                  Peripheral IV - Single Lumen 12/04/23 20 G Anterior;Right Forearm 3 days                    Significant Labs:    CBC/Anemia Profile:  Recent Labs   Lab 12/06/23  0410 12/06/23  0537 12/06/23  1135 12/06/23  1616 12/07/23  0417   WBC 12.35 12.24  --   --  12.25   HGB 5.2* 7.1*  --   --  6.8*   HCT 15.4* 21.0* 23* 19* 20.0*    209  --   --  236   MCV 85 87  --   --  85   RDW 14.9* 15.4*  --   --  16.0*        Chemistries:  Recent Labs   Lab 12/05/23  0745 12/05/23  1821 12/06/23  0410 12/06/23  1128 12/06/23  1613 12/06/23  1819 12/06/23  2352 12/07/23 0417      < > 140  140  --  139  --   --  139   K 4.0   < > 4.2  4.2   < > 5.0 5.0 4.9 5.1      < > 106  106  --  107  --   --  105   CO2 17*   < > 19*  22*  --  18*  --   --  15*   BUN 72*   < > 33*  27*  --  20  --   --  22   CREATININE 3.5*   < > 2.0*  1.6*  --  1.4  --   --  1.5*   CALCIUM 8.5*   < > 8.3*  8.3*  --  7.9*  --   --  8.4*   ALBUMIN 2.5*   < > 2.2*  2.2*  2.2*  --  2.1*  --   --  2.2*   PROT 6.0  --  5.8*  5.9*  --   --   --   --  6.1   BILITOT 0.9  --  0.9  0.9  --   --   --   --  0.7   ALKPHOS 90  --  115  121  --   --   --   --  129   ALT 24  --  17  17  --   --   --   --  33   AST 38  --  49*  47*  --   --   --   --  79*   MG 2.4   < > 2.1  2.1   < > 2.0 2.1 2.2 2.4   PHOS 7.3*   < > 2.8  2.2*  --  4.4  --   --  4.6*    < > = values in this interval not displayed.         Significant Imaging:  I have reviewed all pertinent imaging results/findings within the past 24 hours.  Assessment/Plan:     Cardiac/Vascular  * Acute on chronic combined systolic and diastolic CHF, NYHA class 4    Neuro/Psych:   -- Sedation: none; seroquel 50 Q6 PRN for agitation  -- Pain: d/c all narcotics  -- CT head non con, neurology consult for altered mental status              Cardiac:   -- S/P redo sternotomy LVAD with   Padmini on 12/01, chest closed 12/4  -- MAP Goal: 70-80  -- Cardene PRN  -- Pressors: epi 0.08,  5; currently off vaso  -- midodrine 5 TID added for hypotension  -- Anti-HTNs: none  -- Rhythm: NSR  -- amiodarone 400mg daily  -- LVAD heartmate III: RPM 4800, flow 3.6  -- Beta blocker: Will start when appropriate  -- Statin: Atorvastatin 40 mg QD  -- No ASA at this time  -- heparin 700/hr, PTT goal 35-45  -- coumadin 2mg      Pulmonary:   -- Goal SpO2 >92%  -- Ketty 5, will continue to wean  -- Chest Tubes x 2 (2 Meds):   - output 30cc/24hr      Renal:  -- Trend BUN/Cr   -- Maintain [place], record strict Is/Os  -- on CVVHD  -- net -1.53L/24 hrs  -- goal to be net neg 1.5-2L/24hr      Recent Labs   Lab 12/06/23  0410 12/06/23  1613 12/07/23  0417   BUN 33*  27* 20 22   CREATININE 2.0*  1.6* 1.4 1.5*           FEN / GI:   -- Daily CMP, PRN K/Mag/Phos per protocol   -- Replace electrolytes as needed  -- Nutrition: advanced TF to goal 40/hr  -- Bowel Regimen: Miralax, docusate      ID:   -- Afebrile  -- WBC stable  -- Abx: cefepime. NO vanc  -- vanc level 7.8  -- BAL 12/6 growing GNR: consult ID for abx recommendation    Recent Labs   Lab 12/06/23  0410 12/06/23  0537 12/07/23  0417   WBC 12.35 12.24 12.25           Heme/Onc:   -- Hgb 8.7 pre-operatively  -- received 7 RBC, 2 FFP, 1 platelet, 10 cry intra-op  -- 12/1: 1 RBC, 4 FFP, 2 plts, 1 cryo  -- q4 CBC and coags  -- q4 fibrinogen - keep fibrinogen >300  -- holding ASA    Recent Labs   Lab 12/06/23  0410 12/06/23  0537 12/06/23  1128 12/06/23  1819 12/06/23  2352 12/07/23  0417   HGB 5.2* 7.1*  --   --   --  6.8*    209  --   --   --  236   APTT 28.5  --    < > 35.5* 34.2* 33.6*   INR 1.1  --    < > 1.1 1.1 1.1    < > = values in this interval not displayed.           Endocrine:   -- CTS Goal -140  -- HgbA1c: 7.6  -- Endocrinology consulted for insulin management      PPx:   Feeding: TF to goal  Analgesia/Sedation: propofol/seroquel 50 Q6  Thromboembolic  Prevention: SCDs  HOB >30: Yes  Stress Ulcer: pantoprazole   Glucose Control: Yes, insulin management per Endocrinology  Bowel reg:   Invasive Lines/Drains/Airway:   ETT  OGT  Left radial arterial line  RIJ mac w/ slick + swan   LIJ quad CVC  Chest Tubes: 2 Mediastinal  LVAD  Deescalation: d/c 1 chest tube     Dispo/Code Status/Palliative:     - Continue SICU Care    - Full Code       Critical care was time spent personally by me on the following activities: development of treatment plan with patient or surrogate and bedside caregivers, discussions with consultants, evaluation of patient's response to treatment, examination of patient, ordering and performing treatments and interventions, ordering and review of laboratory studies, ordering and review of radiographic studies, pulse oximetry, re-evaluation of patient's condition.  This critical care time did not overlap with that of any other provider or involve time for any procedures.     Melanie Padilla MD  Critical Care - Surgery  [facility]

## 2023-12-07 NOTE — ASSESSMENT & PLAN NOTE
Neuro/Psych:   -- Sedation: none; seroquel 50 Q6 PRN for agitation  -- Pain: d/c all narcotics             Cardiac:   -- S/P redo sternotomy LVAD with Dr. Washington on 12/01, chest closed 12/4  -- MAP Goal: 70-80  -- Cardene PRN  -- Pressors: epi 0.08,  5; currently off vaso  -- midodrine 5 TID added for hypotension  -- Anti-HTNs: none  -- Rhythm: NSR  -- amiodarone 400mg daily  -- LVAD heartmate III: RPM 4800, flow 3.6  -- Beta blocker: Will start when appropriate  -- Statin: Atorvastatin 40 mg QD  -- No ASA at this time  -- heparin 700/hr, PTT goal 35-45  -- coumadin 2mg      Pulmonary:   -- Goal SpO2 >92%  -- Ketty 10  -- Chest Tubes x 2 (2 Meds):   - output 30cc/24hr  -- Q1 ABGs and lactates      Renal:  -- Trend BUN/Cr   -- Maintain [place], record strict Is/Os  -- on CVVHD  -- net -1.53L/24 hrs  -- goal to be net neg 1.5-2L/24hr      Recent Labs   Lab 12/06/23  0410 12/06/23  1613 12/07/23 0417   BUN 33*  27* 20 22   CREATININE 2.0*  1.6* 1.4 1.5*           FEN / GI:   -- Daily CMP, PRN K/Mag/Phos per protocol   -- Replace electrolytes as needed  -- Nutrition: trickle feeds  -- Bowel Regimen: Miralax, docusate      ID:   -- Afebrile  -- WBC stable  -- Abx: cefepime. NO vanc  -- vanc level 7.8    Recent Labs   Lab 12/06/23  0410 12/06/23  0537 12/07/23  0417   WBC 12.35 12.24 12.25           Heme/Onc:   -- Hgb 8.7 pre-operatively  -- received 7 RBC, 2 FFP, 1 platelet, 10 cry intra-op  -- 12/1: 1 RBC, 4 FFP, 2 plts, 1 cryo  -- q4 CBC and coags  -- q4 fibrinogen - keep fibrinogen >300  -- holding ASA    Recent Labs   Lab 12/06/23  0410 12/06/23  0537 12/06/23  1128 12/06/23  1819 12/06/23  2352 12/07/23 0417   HGB 5.2* 7.1*  --   --   --  6.8*    209  --   --   --  236   APTT 28.5  --    < > 35.5* 34.2* 33.6*   INR 1.1  --    < > 1.1 1.1 1.1    < > = values in this interval not displayed.           Endocrine:   -- CTS Goal -140  -- HgbA1c: 7.6  -- Endocrinology consulted for insulin management       PPx:   Feeding: trickle feeds  Analgesia/Sedation: propofol/seroquel 50 Q6  Thromboembolic Prevention: SCDs  HOB >30: Yes  Stress Ulcer: pantoprazole   Glucose Control: Yes, insulin management per Endocrinology  Bowel reg:   Invasive Lines/Drains/Airway:   ETT  OGT  Left radial arterial line  RIJ mac w/ slick + swan   LIJ quad CVC  Chest Tubes: 2 Mediastinal  LVAD     Dispo/Code Status/Palliative:     - Continue SICU Care    - Full Code

## 2023-12-07 NOTE — PROGRESS NOTES
[facility]  Endocrinology  Progress Note    Admit Date: 11/9/2023     Reason for Consult: Management of T2DM, Hyperglycemia     Surgical Procedure and Date: n/a    Diabetes diagnosis year: 1998    Home Diabetes Medications:  Metformin (off since October)   Lab Results   Component Value Date    HGBA1C 7.6 (H) 10/12/2023       How often checking glucose at home?  Once daily in the AM    BG readings on regimen: 150-160s  Hypoglycemia on the regimen?  No  Missed doses on regimen?  n/a    Diabetes Complications include:     Hyperglycemia    Complicating diabetes co morbidities:   CAD s/p CABG, HTN, HLD      HPI:   Patient is a 63 y.o. male with a diagnosis of CAD s/p 3v CABG (unclear anatomy, 2009), ICM with a recent EF of 15-20% s/p ICD (medtronik 2009), DM2 (a1c 7.7), HTN, HLD, Vfib on amio who presents to the ED with CC of SOB. In the ED he was AF with stable VS on RA.  CBC showing chronic anemia.  CMP notable for acute on chronic CKD with baseline around 2.1 and Cr now 2.6.  BNP elevated.  Lactate neg.  CXR showing pulmonary edema. He was subsequently admitted to the CCU for diuresis.  Endocrinology consulted for management of T2DM.          Interval HPI:   No acute events overnight. Patient in room 52492/70436 A. Blood glucose stable. BG at goal on current insulin regimen (SSI ). Steroid use- none.   3 Days Post-Op  Renal function- Abnormal - cr 1.5   Vasopressors-  Epinephrine     Diet NPO Except for: Medication (per NG tube)     Eating:   NPO  Nausea: No  Hypoglycemia and intervention: No  Fever: No  TPN and/or TF: Yes  If yes, type of TF/TPN and rate: TF @ 10 ml/hr     BP (!) 82/0 (BP Location: Left arm, Patient Position: Lying)   Pulse 91   Temp 99 °F (37.2 °C) (Oral)   Resp (!) 28   Ht 6' (1.829 m)   Wt 95 kg (209 lb 7 oz)   SpO2 100%   BMI 28.40 kg/m²     Labs Reviewed and Include    Recent Labs   Lab 12/07/23  0417 12/07/23  0751   *  --    CALCIUM 8.4*  --    ALBUMIN  "2.2*  --    PROT 6.1  --      --    K 5.1 4.7   CO2 15*  --      --    BUN 22  --    CREATININE 1.5*  --    ALKPHOS 129  --    ALT 33  --    AST 79*  --    BILITOT 0.7  --      Lab Results   Component Value Date    WBC 12.25 12/07/2023    HGB 6.8 (L) 12/07/2023    HCT 20.0 (L) 12/07/2023    MCV 85 12/07/2023     12/07/2023     No results for input(s): "TSH", "FREET4" in the last 168 hours.  Lab Results   Component Value Date    HGBA1C 7.6 (H) 10/12/2023       Nutritional status:   Body mass index is 28.4 kg/m².  Lab Results   Component Value Date    ALBUMIN 2.2 (L) 12/07/2023    ALBUMIN 2.1 (L) 12/06/2023    ALBUMIN 2.2 (L) 12/06/2023    ALBUMIN 2.2 (L) 12/06/2023    ALBUMIN 2.2 (L) 12/06/2023     Lab Results   Component Value Date    PREALBUMIN 19 (L) 12/01/2023    PREALBUMIN 27 11/15/2023       Estimated Creatinine Clearance: 61.9 mL/min (A) (based on SCr of 1.5 mg/dL (H)).    Accu-Checks  Recent Labs     12/06/23  0415 12/06/23  0417 12/06/23  0459 12/06/23  0750 12/06/23  0807 12/06/23  1129 12/06/23  1612 12/06/23  1941 12/06/23  1944 12/07/23  0757   POCTGLUCOSE 102 114* 110 228* 134* 120* 133* 248* 155* 144*       Current Medications and/or Treatments Impacting Glycemic Control  Immunotherapy:    Immunosuppressants       None          Steroids:   Hormones (From admission, onward)      Start     Stop Route Frequency Ordered    12/06/23 1022  vasopressin (PITRESSIN) 0.2 Units/mL in dextrose 5 % (D5W) 100 mL infusion         -- IV Continuous 12/06/23 1023          Pressors:    Autonomic Drugs (From admission, onward)      Start     Stop Route Frequency Ordered    12/07/23 0900  midodrine tablet 5 mg         -- PER NG TUBE 2 times daily 12/06/23 2157    12/06/23 1330  NORepinephrine 4 mg in dextrose 5% 250 mL infusion (premix)        Question Answer Comment   Begin at (in mcg/kg/min): 0.02    Titrate by: (in mcg/kg/min) 0.02    Titrate interval: (in minutes) 5    Titrate to maintain: (MAP or " SBP) MAP    Greater than: (in mmHg) 70    Maximum dose: (in mcg/kg/min) 3        -- IV Continuous 12/06/23 1224    12/06/23 1030  EPINEPHrine 5 mg in dextrose 5% 250 mL infusion (premix)         -- IV Continuous 12/06/23 0926          Hyperglycemia/Diabetes Medications:   Antihyperglycemics (From admission, onward)      None            ASSESSMENT and PLAN    Cardiac/Vascular  * Acute on chronic combined systolic and diastolic CHF, NYHA class 4  Managed per primary team  Optimize BG control  S/p LVAD     Renal/  Acute renal failure with acute tubular necrosis superimposed on stage 3b chronic kidney disease  Lab Results   Component Value Date    CREATININE 1.5 (H) 12/07/2023     Avoid insulin stacking  Titrate insulin slowly       Endocrine  Type 2 diabetes mellitus without complication, without long-term current use of insulin  BG goal 140-180. TF @ 10 mL/hr.     - patient receiving TF @ 10 mL/hr- BG remaining WNL without insulin coverage at this time- will continue SSI (180/25) q4hr as needed and continue to monitor if scheduled Novolog required.   - BG checks q4hr while on TF/NPO   - Novolog (aspart) insulin prn for BG excursions q4hr  - Hypoglycemia protocol in place    ** Please notify Endocrine for any change and/or advance in diet**  ** Please call Endocrine for any BG related issues **    Discharge Planning:   TBD. Please notify endocrinology prior to discharge.          Ebony Carlson PA-C  Endocrinology  [facility]

## 2023-12-07 NOTE — SUBJECTIVE & OBJECTIVE
Interval History/Significant Events: Reintubated yesterday afternoon, bronchoscopy performed with RLL infiltrate seen. off vaso, midodrine decreased to 5 TID, epi 0.04. Tolerating CVVHD, removed 1L.    Follow-up For: Procedure(s) (LRB):  CLOSURE, WOUND, STERNUM (N/A)  INSERTION, GRAFT, PERICARDIUM  DRAINAGE, PLEURAL EFFUSION    Post-Operative Day: 3 Days Post-Op    Objective:     Vital Signs (Most Recent):  Temp: 98.8 °F (37.1 °C) (12/06/23 2315)  Pulse: 86 (12/07/23 0630)  Resp: (!) 28 (12/06/23 0500)  BP: (!) 80/0 (12/07/23 0300)  SpO2: 100 % (12/07/23 0630) Vital Signs (24h Range):  Temp:  [97.6 °F (36.4 °C)-98.8 °F (37.1 °C)] 98.8 °F (37.1 °C)  Pulse:  [] 86  SpO2:  [77 %-100 %] 100 %  BP: (66-82)/(0) 80/0  Arterial Line BP: (61-92)/(48-73) 80/65     Weight: 95 kg (209 lb 7 oz)  Body mass index is 28.4 kg/m².      Intake/Output Summary (Last 24 hours) at 12/7/2023 0641  Last data filed at 12/7/2023 0601  Gross per 24 hour   Intake 1905.61 ml   Output 3390 ml   Net -1484.39 ml          Physical Exam  Vitals and nursing note reviewed.   Constitutional:       General: He is not in acute distress.     Appearance: Normal appearance.   HENT:      Head: Normocephalic and atraumatic.      Mouth/Throat:      Mouth: Mucous membranes are moist.      Pharynx: Oropharynx is clear.   Cardiovascular:      Rate and Rhythm: Normal rate and regular rhythm.      Comments: LVAD in place  Pulmonary:      Effort: Pulmonary effort is normal. No respiratory distress.      Breath sounds: Normal breath sounds.      Comments: Intubated  Vent Mode: A/C  Oxygen Concentration (%):  [] 50  Resp Rate Total:  [18 br/min-58 br/min] 22 br/min  Vt Set:  [420 mL-450 mL] 450 mL  PEEP/CPAP:  [5 cmH20] 5 cmH20  Mean Airway Pressure:  [7.6 cmH20-15 cmH20] 9.3 cmH20     Abdominal:      General: Abdomen is flat. There is no distension.      Palpations: Abdomen is soft.      Tenderness: There is no abdominal tenderness.      Comments: NGT in  place   Genitourinary:     Comments: rex  Musculoskeletal:         General: Normal range of motion.      Right lower leg: No edema.      Left lower leg: No edema.   Skin:     General: Skin is warm and dry.            Vents:  Vent Mode: A/C (12/07/23 0340)  Ventilator Initiated: Yes (12/06/23 1233)  Set Rate: 18 BPM (12/07/23 0340)  Vt Set: 450 mL (12/07/23 0340)  Pressure Support: 5 cmH20 (12/05/23 0930)  PEEP/CPAP: 5 cmH20 (12/07/23 0340)  Oxygen Concentration (%): 50 (12/07/23 0630)  Peak Airway Pressure: 20 cmH20 (12/07/23 0340)  Plateau Pressure: 18 cmH20 (12/07/23 0340)  Total Ve: 10.2 L/m (12/07/23 0340)  Negative Inspiratory Force (cm H2O): 0 (12/07/23 0340)  F/VT Ratio<105 (RSBI): (!) 39.01 (12/04/23 1803)    Lines/Drains/Airways       Peripherally Inserted Central Catheter Line  Duration             PICC Triple Lumen 12/05/23 1208 right basilic 1 day              Central Venous Catheter Line  Duration             Trialysis (Dialysis) Catheter 12/01/23 1530 left internal jugular 5 days              Drain  Duration                  Chest Tube Tube - 1 Right Mediastinal -- days         Chest Tube 12/01/23 Tube - 2 Left Mediastinal 6 days         NG/OG Tube 12/04/23 1730 Left nostril 2 days              Airway  Duration                  Airway - Non-Surgical 12/06/23 1220 Endotracheal Tube <1 day              Arterial Line  Duration             Arterial Line 12/06/23 1526 Left Radial <1 day              Line  Duration                  VAD 12/01/23 1015 Left ventricular assist device HeartMate 3 5 days              Peripheral Intravenous Line  Duration                  Peripheral IV - Single Lumen 12/04/23 20 G Anterior;Right Forearm 3 days                    Significant Labs:    CBC/Anemia Profile:  Recent Labs   Lab 12/06/23  0410 12/06/23  0537 12/06/23  1135 12/06/23  1616 12/07/23  0417   WBC 12.35 12.24  --   --  12.25   HGB 5.2* 7.1*  --   --  6.8*   HCT 15.4* 21.0* 23* 19* 20.0*    209  --   --   236   MCV 85 87  --   --  85   RDW 14.9* 15.4*  --   --  16.0*        Chemistries:  Recent Labs   Lab 12/05/23  0745 12/05/23  1821 12/06/23  0410 12/06/23  1128 12/06/23  1613 12/06/23  1819 12/06/23  2352 12/07/23  0417      < > 140  140  --  139  --   --  139   K 4.0   < > 4.2  4.2   < > 5.0 5.0 4.9 5.1      < > 106  106  --  107  --   --  105   CO2 17*   < > 19*  22*  --  18*  --   --  15*   BUN 72*   < > 33*  27*  --  20  --   --  22   CREATININE 3.5*   < > 2.0*  1.6*  --  1.4  --   --  1.5*   CALCIUM 8.5*   < > 8.3*  8.3*  --  7.9*  --   --  8.4*   ALBUMIN 2.5*   < > 2.2*  2.2*  2.2*  --  2.1*  --   --  2.2*   PROT 6.0  --  5.8*  5.9*  --   --   --   --  6.1   BILITOT 0.9  --  0.9  0.9  --   --   --   --  0.7   ALKPHOS 90  --  115  121  --   --   --   --  129   ALT 24  --  17  17  --   --   --   --  33   AST 38  --  49*  47*  --   --   --   --  79*   MG 2.4   < > 2.1  2.1   < > 2.0 2.1 2.2 2.4   PHOS 7.3*   < > 2.8  2.2*  --  4.4  --   --  4.6*    < > = values in this interval not displayed.         Significant Imaging:  I have reviewed all pertinent imaging results/findings within the past 24 hours.

## 2023-12-07 NOTE — ASSESSMENT & PLAN NOTE
- suspect acute tubular injury secondary to hypotension/cardiogenic shock likely compounded by intra-arterial contrast and anemia with urinary sediment with granular casts  - continue CRRT/Prismax but increased clearance from 25 to 30 mL/kg/hr with Phoxillium BK4/2.5 for metabolic clearance and volume management (goal to keep net negative ~1 liter in the next 24 hours), consent obtained via wife at bedside  - Q8H RFPs and magnesium levels per CRRT protocol   - renal diet/tube feeds when not NPO, volume restriction per primary team  - strict I/O's and daily weights  - keep MAP > 65 mmHg  - renally all dose medications to eGFR  - avoid nephrotoxic agents wean feasible (i.e. NSAIDs, intra-arterial contrast, supra-therapeutic vancomycin levels, etc.)

## 2023-12-07 NOTE — PT/OT/SLP PROGRESS
Occupational Therapy      Patient Name:  Radha Abbott   MRN:  [id number]    Patient not seen today secondary to Other (Comment) (pt remains intubated and leaving for CT upon OT arrival. Will follow-up 12/8.    12/7/2023

## 2023-12-07 NOTE — PROGRESS NOTES
12/07/23 1220   Treatment   Treatment Type Other  (CVVHDF)   Treatment Status Restart   Dialysis Machine Number [id number]   Dialyzer Time (hours) 0   BVP (Liters) 0 L   Solutions Labeled and Current  Yes   Access Left;IJ;Temporary Cath   Catheter Dressing Intact  Yes   Alarms Engaged Yes   CRRT Comments Restart post CT   Prescription   Time (Hours) Continuous   Dialysate K + (mEq/L) 4   Dialysate CA + (mEq/L) 2.5   Cartridge Type Other  ([id number])   CRRT Prismaflex Hourly Documentation   Access Pressure (mmHg) -37 mmHg   Filter Pressure (mmHg) 102 mmHg   Transmembrane Pressure (mmHg) 20 mmHg   Pressure Drop (mmHg) 39 mmHg   Return Pressure (mmHg) 31 mmHg   Effluent Pressure (mmHg) 33 mmHg   Pre Replacement Fluid Rate (mL/hour) 1400 mL/hour   Post Replacement Fluid Rate (mL/hour) 200 mL/hour   Deaeration Chamber Level Adjusted? Yes       CVVHDF restarted as ordered post CT scan.    Dialysis Access: left IJ trialysis  Aspirated, flushed, and accessed using aseptic technique.  Lines connected and secured.    Started without issue.  Refer to CRRT flowsheet for vitals and further details.  Bedside nurse updated on plan of care.

## 2023-12-07 NOTE — PROGRESS NOTES
12/07/23 1602   Treatment   Treatment Type Other  (CVVHDF)   Treatment Status Order change   Dialysis Machine Number [id number]   Solutions Labeled and Current  Yes   Access Left;IJ;Temporary Cath   Catheter Dressing Intact  Yes   Alarms Engaged Yes   CRRT Comments New order verified and updated. Treatment continued.   Prescription   Time (Hours) Continuous   Dialysate K + (mEq/L) 4   Dialysate CA + (mEq/L) 2.5   CRRT Prismaflex Hourly Documentation   Access Pressure (mmHg) -35 mmHg   Filter Pressure (mmHg) 201 mmHg   Transmembrane Pressure (mmHg) 327 mmHg   Pressure Drop (mmHg) 151 mmHg   Return Pressure (mmHg) 23 mmHg   Effluent Pressure (mmHg) -230 mmHg   Pre Replacement Fluid Rate (mL/hour) 1800 mL/hour   Post Replacement Fluid Rate (mL/hour) 200 mL/hour     New order verified and treatment updated. Treatment continued. Primary RN updated on plan of care.

## 2023-12-07 NOTE — PROGRESS NOTES
Pt intubated and sedated, no family at bedside.  Will continue to follow along with teams and provide VAD education when appropriate.

## 2023-12-07 NOTE — PLAN OF CARE
SICU PLAN OF CARE NOTE    Dx: Acute on chronic combined systolic and diastolic CHF, NYHA class 4    Shift Events: reinutbated.  Priismax, bronch at bedside.  Albia replaced.  Trickle feeds.    Goals of Care: MAP 70-80    Neuro: Sedated, Moves All Extremities, and Unresponsive    Vital Signs: BP (!) 66/0 (BP Location: Left arm, Patient Position: Lying)   Pulse 89   Temp 97.8 °F (36.6 °C) (Oral)   Resp (!) 28   Ht 6' (1.829 m)   Wt 92.5 kg (204 lb)   SpO2 100%   BMI 27.67 kg/m²     Cardiac: NSR    Respiratory: Ventilator    Diet: NPO and Tube Feeds    Gtts: Propfol, Epinephrine, Dobutamine, and Heparin    Urine Output: Anuric 0 cc/shift    Drains: Chest Tube, total output 20 cc /  shift and NG/OG Tube 200 cc /  shift    CRRT (Prismax, UF 50)    VAD (HeartMate3, speed 4800)    Labs/Accuchecks: Accuchecks q4, daily labs. Aptt q6.    Skin: Pt skin dry and intact.  Fluid immersion mattress in place.  Pt turned q2 hours per protocol.  Foam padding in place on sacrum and heels.  Pulses by doppler upper and lower extremities.  VAD dressing site changed.  Prevena in place over midsternal incision.    View flowsheet for full assessment details.

## 2023-12-07 NOTE — PLAN OF CARE
SICU PLAN OF CARE NOTE    Dx: Acute on chronic combined systolic and diastolic CHF, NYHA class 4    Vital Signs: BP (!) 80/0 (BP Location: Left arm)   Pulse 87   Temp 98.8 °F (37.1 °C) (Oral)   Resp (!) 28   Ht 6' (1.829 m)   Wt 95 kg (209 lb 7 oz)   SpO2 100%   BMI 28.40 kg/m²     SHIFT EVENTS:  No acute events overnight. CVP 6/8/9/8. Afebrile. NSR in 80s. No VAD alarms. SvO2 68. Epi decreased to 0.04mcg/kg/min per . Midodrine dose decreased as well; MAPs reamina 70-80. Prismax throughout shift; target removal of 50/hr    Neuro: Sedation paused; moved all extremities spontaneously. Will attempt to open eyes to voice. Propofol titrated for RASS -2; however; pt continues to breathe over vent.     Respiratory: Ventilator ACVC 50% FiO2 and 5 PEEP with 10 ppm iNO2.     Cardiac: NSR  HM3  Speed: 4800  LSL: 4400  Flow: 3.6  PI: 4.7  Pwr: 3.1    Diet: Tube Feeds @ 10cc/hr    Gtts:   Epi, , heparin, Propfol    Urine Output: Anuric    Drains:     L Chest Tube, total output 0 cc /  shift    R Chest Tube, total output 20 cc /  shift     Labs/Accuchecks: q4h accuchecks and ABG, q6h labs; q12h SvO2; daily labs.    SKIN NOTE:  Skin: No breakdown noted; sacral foam replaced; pt turned q2h and PRN.     Skin precautions maintained including:  Sacrum and heels with foam dressing in place for pressure protection. Frequent weight shift assistance provided Q2 hr to prevent breakdown. Bed plugged in and mattress inflated; [employer] bed utilized. Adhesive use limited. Heels elevated off bed. Pressure points protected and positioning supports utilized.  Skin-to-device areas padded. Skin-to-skin areas padded    POC reviewed with patient and spouse; questions and concerns addressed. See flow sheets for full assessment details.

## 2023-12-07 NOTE — PROGRESS NOTES
12/07/23 1635   Treatment   Treatment Status Clotting;Blood returned   CRRT Comments Treatment clotted. blood returned.

## 2023-12-07 NOTE — ASSESSMENT & PLAN NOTE
-HeartMate 3 Implanted  12/1/2023  as DT  -CTS Primary  -Implanted by Dr. Washington  -HOLD Coumadin,  Goal INR 2.0-3.0 . Subtherapeutic today. On Heparin gtt   -Antiplatelets Not on ASA  -LDH is stable overall today. Will continue to monitor daily.  -Speed set at  4900   rpm, LSL 4500 rpm   -Interrogation notable for no events  -Not listed for OHTx, declined for OHTX due to comorbidities         Procedure: Device Interrogation Including analysis of device parameters  Current Settings: Ventricular Assist Device  Review of device function is stable/unstable stable        12/7/2023    10:00 AM 12/7/2023     9:00 AM 12/7/2023     8:00 AM 12/7/2023     7:00 AM 12/7/2023     6:00 AM 12/7/2023     5:00 AM 12/7/2023     4:00 AM   TXP LVAD INTERROGATIONS   Type HeartMate3 HeartMate3 HeartMate3 HeartMate3 HeartMate3 HeartMate3 HeartMate3   Flow 4.1 4 3.6 3.7 3.8 3.7 3.9   Speed 4900 4800 4800 4800 4800 4800 4800   PI 3.7 4.3 4.5 5.1 4.1 4.2 3.8   Power ([place]) 3.3 3.1 3.1 3.2 3.1 3.1 3.1   LSL 4500 4400 4400 4400 4400 4400 4400   Pulsatility Intermittent pulse Intermittent pulse Intermittent pulse Intermittent pulse Intermittent pulse Intermittent pulse Intermittent pulse

## 2023-12-07 NOTE — ASSESSMENT & PLAN NOTE
-multifactorial  -likely metabolic encephalopathy +/- icu delirium   -CT 12/7 negative for acute process  -Continue RRT for metabolic clearance  -provide frequent re-orientation  -Promote sleep/wake cycle   -Neurology consulted

## 2023-12-07 NOTE — SUBJECTIVE & OBJECTIVE
Past Medical History:   Diagnosis Date    CAD (coronary artery disease)     Diabetes mellitus     HFrEF (heart failure with reduced ejection fraction)     ICD (implantable cardioverter-defibrillator) in place     MI, old        Past Surgical History:   Procedure Laterality Date    ANGIOPLASTY-VENOUS ARTERY Right 12/1/2023    Procedure: ANGIOPLASTY-VENOUS ARTERY, RIGHT FEMORAL;  Surgeon: Yuri Washington MD;  Location: [facility] OR [address] [place] [address];  Service: Cardiovascular;  Laterality: Right;    AORTIC VALVULOPLASTY N/A 12/1/2023    Procedure: REPAIR, AORTIC VALVE;  Surgeon: Yuri Washington MD;  [facility];  Service: Cardiovascular;  Laterality: N/A;    CARDIAC SURGERY      CLOSURE N/A 12/1/2023    Procedure: CLOSURE, TEMPORARY;  Surgeon: Yuri Washington MD;  [facility];  Service: Cardiovascular;  Laterality: N/A;    DRAINAGE OF PLEURAL EFFUSION  12/4/2023    Procedure: DRAINAGE, PLEURAL EFFUSION;  Surgeon: Yuri Washington MD;  [facility];  Service: Cardiovascular;;    INSERTION OF GRAFT TO PERICARDIUM  12/4/2023    Procedure: INSERTION, GRAFT, PERICARDIUM;  Surgeon: Yuri Washington MD;  [facility];  Service: Cardiovascular;;    INSERTION OF INTRA-AORTIC BALLOON ASSIST DEVICE Right 11/21/2023    Procedure: INSERTION, INTRA-AORTIC BALLOON PUMP;  Surgeon: Finn Cohn MD;  Location: [facility] CATH LAB;  Service: Cardiology;  Laterality: Right;    LEFT VENTRICULAR ASSIST DEVICE Left 12/1/2023    Procedure: INSERTION-LEFT VENTRICULAR ASSIST DEVICE;  Surgeon: Yuri Washington MD;  [facility];  Service: Cardiovascular;  Laterality: Left;  REDO STERNOTOMY - REDO SAW NEEDED FOR CASE    LYSIS OF ADHESIONS  12/1/2023    Procedure: LYSIS, ADHESIONS;  Surgeon: Yuri Washington MD;  [facility];  Service: Cardiovascular;;    PLACEMENT OF SWAN ROLANDO CATHETER WITH IMAGING GUIDANCE  11/20/2023    Procedure: INSERTION, CATHETER, SWAN-ROLANDO, WITH IMAGING  GUIDANCE;  Surgeon: Sajan Hurley MD;  Location: [facility] CATH LAB;  Service: Cardiology;;    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 12/1/2023    Procedure: REPAIR, ANEURYSM, ARTERY, FEMORAL;  Surgeon: Yuri Washington MD;  Location: [facility] OR [address] [place] [address];  Service: Cardiovascular;  Laterality: Right;  Right Femoral Artery Repair    RIGHT HEART CATHETERIZATION Right 10/10/2023    Procedure: INSERTION, CATHETER, RIGHT HEART;  Surgeon: Bin Gandhi MD;  Location: [facility] CATH LAB;  Service: Cardiology;  Laterality: Right;    RIGHT HEART CATHETERIZATION Right 10/13/2023    Procedure: INSERTION, CATHETER, RIGHT HEART;  Surgeon: Walter Mcintyre MD;  Location: [facility] CATH LAB;  Service: Cardiology;  Laterality: Right;    RIGHT HEART CATHETERIZATION  11/13/2023    RIGHT HEART CATHETERIZATION Right 11/13/2023    Procedure: INSERTION, CATHETER, RIGHT HEART;  Surgeon: Juventino Bermudez Jr., MD;  Location: [facility] CATH LAB;  Service: Cardiology;  Laterality: Right;    RIGHT HEART CATHETERIZATION Right 11/20/2023    Procedure: INSERTION, CATHETER, RIGHT HEART;  Surgeon: Sajan Hurley MD;  Location: [facility] CATH LAB;  Service: Cardiology;  Laterality: Right;    STERNAL WOUND CLOSURE N/A 12/4/2023    Procedure: CLOSURE, WOUND, STERNUM;  Surgeon: Yuri Washington MD;  Location: [address];  Service: Cardiovascular;  Laterality: N/A;    STERNOTOMY N/A 12/1/2023    Procedure: STERNOTOMY, REDO;  Surgeon: Yuri Washington MD;  [address];  Service: Cardiovascular;  Laterality: N/A;    VALVULOPLASTY, MITRAL VALVE N/A 12/1/2023    Procedure: VALVULOPLASTY, MITRAL VALVE;  Surgeon: Yuri Washington MD;  Location: [facility] OR [address] [place] [address];  Service: Cardiovascular;  Laterality: N/A;       Review of patient's allergies indicates:  No Known Allergies    Medications:  Medications Prior to Admission   Medication Sig    amiodarone (PACERONE) 400 MG tablet Take 1 tablet (400 mg total) by mouth once daily.    apixaban (ELIQUIS) 5  mg Tab Take 1 tablet (5 mg total) by mouth 2 (two) times daily.    aspirin (ECOTRIN) 81 MG EC tablet Take 81 mg by mouth once daily.    atorvastatin (LIPITOR) 40 MG tablet Take 40 mg by mouth.    DOBUTamine (DOBUTREX) 1,000 mg/250 mL (4,000 mcg/mL) infusion Inject 389.5 mcg/min into the vein continuous.    fish oil-omega-3 fatty acids 300-1,000 mg capsule Take 2 g by mouth once daily.    furosemide (LASIX) 40 MG tablet Take 1 tablet (40 mg total) by mouth 2 (two) times daily.    gabapentin (NEURONTIN) 300 MG capsule Take 300 mg by mouth nightly as needed.    multivitamin capsule Take 1 capsule by mouth once daily.    pantoprazole (PROTONIX) 40 MG tablet Take 1 tablet (40 mg total) by mouth before breakfast.    potassium chloride SA (K-DUR,KLOR-CON) 20 MEQ tablet Take 1 tablet (20 mEq total) by mouth once daily.    sacubitriL-valsartan (ENTRESTO) 24-26 mg per tablet Take 1 tablet by mouth 2 (two) times daily.    metOLazone (ZAROXOLYN) 2.5 MG tablet Take 1 tablet (2.5 mg total) by mouth once daily. Thursday 11/9 and Saturday 11/10    torsemide (DEMADEX) 20 MG Tab Take 2 tablets (40 mg total) by mouth 2 (two) times a day.     Antibiotics (From admission, onward)      Start     Stop Route Frequency Ordered    11/30/23 2130  mupirocin 2 % ointment 1 g         12/01/23 2059 Nasl 2 times daily 11/30/23 2128 11/30/23 2130  rifAMpin (RIFADIN) 600 mg in dextrose 5 % (D5W) 100 mL IVPB  (Med - Anti-infectives - NO penicillin allergy or mild to moderate penicillin allergy)         12/01/23 0929 IR Once pre-op 11/30/23 2128          Antifungals (From admission, onward)      None          Antivirals (From admission, onward)      None             Immunization History   Administered Date(s) Administered    COVID-19, MRNA, LN-S, PF (MODERNA FULL 0.5 ML DOSE) 03/18/2021, 04/15/2021    Hepatitis A, Adult 11/17/2023    Hepatitis B (recombinant) Adjuvanted, 2 dose 11/17/2023       Family History    None       Social History      Socioeconomic History    Marital status:    Tobacco Use    Smoking status: Every Day     Current packs/day: 0.50     Types: Cigarettes   Substance and Sexual Activity    Alcohol use: Yes     Comment: rarely    Drug use: No     Social Determinants of Health     Financial Resource Strain: Low Risk  (10/27/2023)    Overall Financial Resource Strain (CARDIA)     Difficulty of Paying Living Expenses: Not hard at all   Food Insecurity: No Food Insecurity (10/27/2023)    Hunger Vital Sign     Worried About Running Out of Food in the Last Year: Never true     Ran Out of Food in the Last Year: Never true   Transportation Needs: No Transportation Needs (10/27/2023)    PRAPARE - Transportation     Lack of Transportation (Medical): No     Lack of Transportation (Non-Medical): No   Physical Activity: Inactive (10/27/2023)    Exercise Vital Sign     Days of Exercise per Week: 0 days     Minutes of Exercise per Session: 0 min   Stress: No Stress Concern Present (10/27/2023)    [employer] [place] [employer] - Occupational Stress Questionnaire     Feeling of Stress : Not at all   Social Connections: Moderately Integrated (10/27/2023)    Social Connection and Isolation Panel [NHANES]     Frequency of Communication with Friends and Family: More than three times a week     Frequency of Social Gatherings with Friends and Family: More than three times a week     Attends Oriental orthodox Services: More than 4 times per year     Active Member of Clubs or Organizations: No     Attends Club or Organization Meetings: Never     Marital Status:    Housing Stability: Low Risk  (10/27/2023)    Housing Stability Vital Sign     Unable to Pay for Housing in the Last Year: No     Number of Places Lived in the Last Year: 1     Unstable Housing in the Last Year: No     Review of Systems   Unable to perform ROS: Intubated     Objective:     Vital Signs (Most Recent):  Temp: 99 °F (37.2 °C) (12/07/23 0700)  Pulse: 97 (12/07/23  "1400)  Resp: (!) 28 (12/06/23 0500)  BP: (!) 82/0 (12/07/23 0700)  SpO2: 100 % (12/07/23 1400) Vital Signs (24h Range):  Temp:  [97.8 °F (36.6 °C)-99 °F (37.2 °C)] 99 °F (37.2 °C)  Pulse:  [] 97  SpO2:  [77 %-100 %] 100 %  BP: (74-82)/(0) 82/0  Arterial Line BP: (67-92)/(48-73) 78/59     Weight: 95 kg (209 lb 7 oz)  Body mass index is 28.4 kg/m².    Estimated Creatinine Clearance: 61.9 mL/min (A) (based on SCr of 1.5 mg/dL (H)).     Physical Exam  Vitals and nursing note reviewed.   Constitutional:       General: He is not in acute distress.     Appearance: Normal appearance. He is diaphoretic. He is not ill-appearing or toxic-appearing.   HENT:      Head: Normocephalic.      Right Ear: External ear normal.      Left Ear: External ear normal.      Mouth/Throat:      Mouth: Mucous membranes are moist.   Eyes:      Pupils: Pupils are equal, round, and reactive to light.   Cardiovascular:      Rate and Rhythm: Normal rate and regular rhythm.      Heart sounds: No murmur heard.     Comments: humming  Abdominal:      Tenderness: There is no guarding or rebound.   Skin:     Findings: No erythema or rash.   Neurological:      General: No focal deficit present.      Mental Status: He is alert.     Multiple lines     Significant Labs: Blood Culture:   Recent Labs   Lab 11/28/23  1450 11/28/23  1452   LABBLOO No growth after 5 days. No growth after 5 days.     CBC:   Recent Labs   Lab 12/06/23  0537 12/06/23  1135 12/06/23  1616 12/07/23  0417 12/07/23  1206   WBC 12.24  --   --  12.25 10.31   HGB 7.1*  --   --  6.8* 7.0*   HCT 21.0*   < > 19* 20.0* 20.6*     --   --  236 249    < > = values in this interval not displayed.     Procalcitonin: No results for input(s): "PROCAL" in the last 48 hours.  Respiratory Culture:   Recent Labs   Lab 12/06/23  1531   GSRESP Moderate WBC's  Rare Gram negative rods   RESPIRATORYC No Growth     Urine Culture: No results for input(s): "LABURIN" in the last 4320 hours.  Wound " "Culture: No results for input(s): "LABAERO" in the last 4320 hours.    Significant Imaging: I have reviewed all pertinent imaging results/findings within the past 24 hours.  "

## 2023-12-07 NOTE — PROGRESS NOTES
12/07/23 0007   Treatment   Treatment Type Other  (CVVHDF)   Treatment Status Daily equipment check   Dialysis Machine Number [id number]   Dialyzer Time (hours) 10.55   Solutions Labeled and Current  Yes   Access Tunneled Cath;Left;IJ   Catheter Dressing Intact  Yes   Alarms Engaged Yes   CRRT Comments daily check done

## 2023-12-07 NOTE — ASSESSMENT & PLAN NOTE
IVÁN on CKD2. Baseline Cr of 1.7-2.0.   - Hold ARNi, entresto  -Trend BMPs daily   -SrCr 1.5/BUN 22 today  -Prismax for volume removal   -Nephrology following

## 2023-12-07 NOTE — SUBJECTIVE & OBJECTIVE
Interval History: Patient POD #6 s/p HM3 LVAD implant. Re-intubated yesterday afternoon. CTH this AM was negative. Neurology consulted. BALs from bronch with rare GNRs, now on Zosyn. Epi weaned to 0.04. Remains on  5. Volume removal with Prismax net 50cc/hr. LVAD speed increased to 4900RPMs. Echo ordered for tomorrow.       Continuous Infusions:   sodium chloride 0.9%      sodium chloride 0.9% Stopped (12/05/23 1634)    DOBUTamine 5 mcg/kg/min (12/07/23 1000)    EPINEPHrine 0.04 mcg/kg/min (12/07/23 1000)    heparin (porcine) in 5 % dex 700 Units/hr (12/07/23 1000)    nitric oxide gas      NORepinephrine bitartrate-D5W      propofoL 40 mcg/kg/min (12/07/23 1000)    vasopressin Stopped (12/06/23 1415)     Scheduled Meds:   amiodarone  400 mg Per NG tube Daily    atorvastatin  40 mg Per NG tube Daily    calcium gluconate IVPB  1 g Intravenous Q1H    ferrous gluconate  324 mg Per NG tube Daily with breakfast    midodrine  5 mg Per NG tube BID    pantoprazole  40 mg Intravenous BID    polyethylene glycol  17 g Per NG tube BID    senna-docusate 8.6-50 mg  2 tablet Per NG tube BID    warfarin  2 mg Per NG tube Daily     PRN Meds:acetaminophen, albumin human 5%, albuterol sulfate, bisacodyL, dextrose 10%, dextrose 10%, magnesium hydroxide 400 mg/5 ml, magnesium sulfate IVPB, potassium chloride, potassium chloride, QUEtiapine, Flushing PICC/Midline Protocol **AND** [DISCONTINUED] sodium chloride 0.9% **AND** sodium chloride 0.9%, sodium phosphate 20.01 mmol in dextrose 5 % (D5W) 250 mL IVPB, sodium phosphate 30 mmol in dextrose 5 % (D5W) 250 mL IVPB, sodium phosphate 39.99 mmol in dextrose 5 % (D5W) 250 mL IVPB    Review of patient's allergies indicates:  No Known Allergies  Objective:     Vital Signs (Most Recent):  Temp: 99 °F (37.2 °C) (12/07/23 0700)  Pulse: 90 (12/07/23 1045)  Resp: (!) 28 (12/06/23 0500)  BP: (!) 82/0 (12/07/23 0700)  SpO2: 100 % (12/07/23 1045) Vital Signs (24h Range):  Temp:  [97.6 °F (36.4  °C)-99 °F (37.2 °C)] 99 °F (37.2 °C)  Pulse:  [] 90  SpO2:  [77 %-100 %] 100 %  BP: (66-82)/(0) 82/0  Arterial Line BP: (61-92)/(48-73) 76/63     Patient Vitals for the past 72 hrs (Last 3 readings):   Weight   12/07/23 0501 95 kg (209 lb 7 oz)   12/07/23 0500 95 kg (209 lb 7 oz)   12/06/23 1107 92.5 kg (204 lb)       Body mass index is 28.4 kg/m².      Intake/Output Summary (Last 24 hours) at 12/7/2023 1058  Last data filed at 12/7/2023 1000  Gross per 24 hour   Intake 1217.12 ml   Output 2674 ml   Net -1456.88 ml         Hemodynamic Parameters:       Telemetry: NSR        Physical Exam  Constitutional:       Appearance: He is ill-appearing.      Comments: Intubated and sedated    HENT:      Head: Normocephalic and atraumatic.   Eyes:      Pupils: Pupils are equal, round, and reactive to light.   Cardiovascular:      Rate and Rhythm: Normal rate and regular rhythm.      Comments: LVAD hum  Pulmonary:      Breath sounds: Rhonchi and rales present.   Chest:      Comments: CT x2  Abdominal:      General: Abdomen is flat. Bowel sounds are normal. There is no distension.      Palpations: Abdomen is soft.   Musculoskeletal:         General: Normal range of motion.      Cervical back: Neck supple.   Skin:     General: Skin is warm and dry.   Neurological:      Comments: Not following commands. Jessica            Significant Labs:  CBC:  Recent Labs   Lab 12/06/23  0410 12/06/23  0537 12/06/23  1135 12/06/23  1616 12/07/23  0417   WBC 12.35 12.24  --   --  12.25   RBC 1.82* 2.42*  --   --  2.35*   HGB 5.2* 7.1*  --   --  6.8*   HCT 15.4* 21.0* 23* 19* 20.0*    209  --   --  236   MCV 85 87  --   --  85   MCH 28.6 29.3  --   --  28.9   MCHC 33.8 33.8  --   --  34.0       BNP:  Recent Labs   Lab 12/04/23  0412 12/06/23  0410   * 1,679*       CMP:  Recent Labs   Lab 12/05/23  0745 12/05/23  1821 12/06/23  0410 12/06/23  1128 12/06/23  1613 12/06/23  1819 12/06/23  2352 12/07/23  0417 12/07/23  0758   *    < > 98  96  --  136*  --   --  128*  --    CALCIUM 8.5*   < > 8.3*  8.3*  --  7.9*  --   --  8.4*  --    ALBUMIN 2.5*   < > 2.2*  2.2*  2.2*  --  2.1*  --   --  2.2*  --    PROT 6.0  --  5.8*  5.9*  --   --   --   --  6.1  --       < > 140  140  --  139  --   --  139  --    K 4.0   < > 4.2  4.2   < > 5.0   < > 4.9 5.1 4.7   CO2 17*   < > 19*  22*  --  18*  --   --  15*  --       < > 106  106  --  107  --   --  105  --    BUN 72*   < > 33*  27*  --  20  --   --  22  --    CREATININE 3.5*   < > 2.0*  1.6*  --  1.4  --   --  1.5*  --    ALKPHOS 90  --  115  121  --   --   --   --  129  --    ALT 24  --  17  17  --   --   --   --  33  --    AST 38  --  49*  47*  --   --   --   --  79*  --    BILITOT 0.9  --  0.9  0.9  --   --   --   --  0.7  --     < > = values in this interval not displayed.        Coagulation:   Recent Labs   Lab 12/06/23  1819 12/06/23  2352 12/07/23 0417 12/07/23  0826   INR 1.1 1.1 1.1  --    APTT 35.5* 34.2* 33.6* 32.2*       LDH:  Recent Labs   Lab 12/05/23  0406 12/06/23  0410 12/07/23  0417   * 584* 591*       Microbiology:  Microbiology Results (last 7 days)       Procedure Component Value Units Date/Time    AFB Culture & Smear [[id number]] Collected: 12/06/23 7392    Order Status: Completed Specimen: Body Fluid from Lung, Right Updated: 12/07/23 1057     AFB CULTURE STAIN No acid fast bacilli seen.    Narrative:      Bronchial Wash    Culture, Respiratory [[id number]] Collected: 12/06/23 1531    Order Status: Completed Specimen: Respiratory from Bronchial Wash Updated: 12/07/23 0845     Respiratory Culture No Growth     Gram Stain (Respiratory) Moderate WBC's     Gram Stain (Respiratory) Rare Gram negative rods    Narrative:      Bronchial Wash    Direct AFB stain [[id number]] Collected: 12/06/23 1532    Order Status: Completed Specimen: Body Fluid from Lung, Right Updated: 12/06/23 2257     Direct Acid Fast No acid fast bacilli seen.    Narrative:       Bronchial Wash    Fungus culture [[id number]] Collected: 12/06/23 1532    Order Status: Sent Specimen: Body Fluid from Lung, Right Updated: 12/06/23 1719    Gram stain [[id number]] Collected: 12/06/23 1532    Order Status: Canceled Specimen: Body Fluid from Lung, Right     Blood culture [[id number]] Collected: 11/28/23 1452    Order Status: Completed Specimen: Blood from Peripheral, Upper Arm, Right Updated: 12/03/23 1612     Blood Culture, Routine No growth after 5 days.    Blood culture [[id number]] Collected: 11/28/23 1450    Order Status: Completed Specimen: Blood from Peripheral, Forearm, Right Updated: 12/03/23 1612     Blood Culture, Routine No growth after 5 days.            BMP:   Recent Labs   Lab 12/07/23  0417 12/07/23  0758   *  --      --    K 5.1 4.7     --    CO2 15*  --    BUN 22  --    CREATININE 1.5*  --    CALCIUM 8.4*  --    MG 2.4 2.3       I have reviewed all pertinent labs within the past 24 hours.    Estimated Creatinine Clearance: 61.9 mL/min (A) (based on SCr of 1.5 mg/dL (H)).    Diagnostic Results:  I have reviewed all pertinent imaging results/findings within the past 24 hours.

## 2023-12-08 LAB
ABO + RH BLD: NORMAL
ALBUMIN SERPL BCP-MCNC: 1.9 G/DL (ref 3.5–5.2)
ALBUMIN SERPL BCP-MCNC: 1.9 G/DL (ref 3.5–5.2)
ALBUMIN SERPL BCP-MCNC: 2 G/DL (ref 3.5–5.2)
ALBUMIN SERPL BCP-MCNC: 2 G/DL (ref 3.5–5.2)
ALLENS TEST: ABNORMAL
ALLENS TEST: NORMAL
ALP SERPL-CCNC: 139 U/L (ref 55–135)
ALT SERPL W/O P-5'-P-CCNC: 26 U/L (ref 10–44)
ANION GAP SERPL CALC-SCNC: 13 MMOL/L (ref 8–16)
ANION GAP SERPL CALC-SCNC: 9 MMOL/L (ref 8–16)
ANION GAP SERPL CALC-SCNC: 9 MMOL/L (ref 8–16)
APTT PPP: 28.7 SEC (ref 21–32)
APTT PPP: 33.4 SEC (ref 21–32)
APTT PPP: 34.3 SEC (ref 21–32)
APTT PPP: 34.5 SEC (ref 21–32)
APTT PPP: 35.2 SEC (ref 21–32)
APTT PPP: 35.2 SEC (ref 21–32)
AST SERPL-CCNC: 55 U/L (ref 10–40)
BACTERIA SPEC AEROBE CULT: NO GROWTH
BASOPHILS # BLD AUTO: 0.03 K/UL (ref 0–0.2)
BASOPHILS NFR BLD: 0.3 % (ref 0–1.9)
BILIRUB DIRECT SERPL-MCNC: 0.4 MG/DL (ref 0.1–0.3)
BILIRUB SERPL-MCNC: 0.8 MG/DL (ref 0.1–1)
BIPAP: 0
BLD GP AB SCN CELLS X3 SERPL QL: NORMAL
BLD PROD TYP BPU: NORMAL
BLD PROD TYP BPU: NORMAL
BLOOD UNIT EXPIRATION DATE: NORMAL
BLOOD UNIT EXPIRATION DATE: NORMAL
BLOOD UNIT TYPE CODE: 6200
BLOOD UNIT TYPE CODE: 6200
BLOOD UNIT TYPE: NORMAL
BLOOD UNIT TYPE: NORMAL
BNP SERPL-MCNC: 695 PG/ML (ref 0–99)
BSA FOR ECHO PROCEDURE: 2.1 M2
BUN SERPL-MCNC: 10 MG/DL (ref 8–23)
BUN SERPL-MCNC: 13 MG/DL (ref 8–23)
BUN SERPL-MCNC: 7 MG/DL (ref 8–23)
CA-I BLDV-SCNC: 1.03 MMOL/L (ref 1.06–1.42)
CA-I BLDV-SCNC: 1.05 MMOL/L (ref 1.06–1.42)
CA-I BLDV-SCNC: 1.06 MMOL/L (ref 1.06–1.42)
CALCIUM SERPL-MCNC: 7.8 MG/DL (ref 8.7–10.5)
CALCIUM SERPL-MCNC: 8.1 MG/DL (ref 8.7–10.5)
CALCIUM SERPL-MCNC: 8.1 MG/DL (ref 8.7–10.5)
CHLORIDE SERPL-SCNC: 105 MMOL/L (ref 95–110)
CHLORIDE SERPL-SCNC: 106 MMOL/L (ref 95–110)
CHLORIDE SERPL-SCNC: 107 MMOL/L (ref 95–110)
CO2 SERPL-SCNC: 21 MMOL/L (ref 23–29)
CO2 SERPL-SCNC: 26 MMOL/L (ref 23–29)
CO2 SERPL-SCNC: 28 MMOL/L (ref 23–29)
CODING SYSTEM: NORMAL
CODING SYSTEM: NORMAL
CORRECTED TEMPERATURE (PCO2): 31.1 MMHG
CORRECTED TEMPERATURE (PH): 7.49
CORRECTED TEMPERATURE (PO2): 108 MMHG
CREAT SERPL-MCNC: 0.7 MG/DL (ref 0.5–1.4)
CREAT SERPL-MCNC: 1 MG/DL (ref 0.5–1.4)
CREAT SERPL-MCNC: 1.1 MG/DL (ref 0.5–1.4)
CROSSMATCH INTERPRETATION: NORMAL
CROSSMATCH INTERPRETATION: NORMAL
CRP SERPL-MCNC: 297.9 MG/L (ref 0–8.2)
CV ECHO LV RWT: 0.24 CM
DELSYS: ABNORMAL
DELSYS: NORMAL
DIFFERENTIAL METHOD BLD: ABNORMAL
DISPENSE STATUS: NORMAL
DISPENSE STATUS: NORMAL
E WAVE DECELERATION TIME: 213.8 MSEC
E/A RATIO: 1.25
E/E' RATIO: 17.75 M/S
ECHO LV POSTERIOR WALL: 0.73 CM (ref 0.6–1.1)
EOSINOPHIL # BLD AUTO: 0 K/UL (ref 0–0.5)
EOSINOPHIL NFR BLD: 0.4 % (ref 0–8)
ERYTHROCYTE [DISTWIDTH] IN BLOOD BY AUTOMATED COUNT: 15.9 % (ref 11.5–14.5)
ERYTHROCYTE [SEDIMENTATION RATE] IN BLOOD BY WESTERGREN METHOD: 18 MM/H
ERYTHROCYTE [SEDIMENTATION RATE] IN BLOOD BY WESTERGREN METHOD: 18 MM/H
EST. GFR  (NO RACE VARIABLE): >60 ML/MIN/1.73 M^2
FIBRINOGEN PPP-MCNC: 628 MG/DL (ref 182–400)
FIBRINOGEN PPP-MCNC: 652 MG/DL (ref 182–400)
FIBRINOGEN PPP-MCNC: 665 MG/DL (ref 182–400)
FIBRINOGEN PPP-MCNC: 692 MG/DL (ref 182–400)
FIO2: 100
FIO2: 40
FIO2: 40 %
FRACTIONAL SHORTENING: 5 % (ref 28–44)
GLUCOSE SERPL-MCNC: 170 MG/DL (ref 70–110)
GLUCOSE SERPL-MCNC: 188 MG/DL (ref 70–110)
GLUCOSE SERPL-MCNC: 209 MG/DL (ref 70–110)
GRAM STN SPEC: NORMAL
GRAM STN SPEC: NORMAL
HCO3 UR-SCNC: 21.2 MMOL/L (ref 24–28)
HCO3 UR-SCNC: 25.5 MMOL/L (ref 24–28)
HCO3 UR-SCNC: 25.8 MMOL/L (ref 24–28)
HCT VFR BLD AUTO: 19 % (ref 40–54)
HGB BLD-MCNC: 6.3 G/DL (ref 14–18)
HGB BLD-MCNC: 6.4 G/DL
IMM GRANULOCYTES # BLD AUTO: 0.28 K/UL (ref 0–0.04)
IMM GRANULOCYTES NFR BLD AUTO: 2.7 % (ref 0–0.5)
INR PPP: 1.1 (ref 0.8–1.2)
INTERVENTRICULAR SEPTUM: 0.73 CM (ref 0.6–1.1)
LA MAJOR: 4.82 CM
LA MINOR: 5.25 CM
LDH SERPL L TO P-CCNC: 0.3 MMOL/L (ref 0.36–1.25)
LDH SERPL L TO P-CCNC: 0.35 MMOL/L (ref 0.36–1.25)
LDH SERPL L TO P-CCNC: 0.37 MMOL/L (ref 0.36–1.25)
LDH SERPL L TO P-CCNC: 526 U/L (ref 110–260)
LEFT ATRIUM SIZE: 4.25 CM
LEFT INTERNAL DIMENSION IN SYSTOLE: 5.76 CM (ref 2.1–4)
LEFT VENTRICLE DIASTOLIC VOLUME INDEX: 88.56 ML/M2
LEFT VENTRICLE DIASTOLIC VOLUME: 185.08 ML
LEFT VENTRICLE MASS INDEX: 81 G/M2
LEFT VENTRICLE SYSTOLIC VOLUME INDEX: 78.3 ML/M2
LEFT VENTRICLE SYSTOLIC VOLUME: 163.67 ML
LEFT VENTRICULAR INTERNAL DIMENSION IN DIASTOLE: 6.07 CM (ref 3.5–6)
LEFT VENTRICULAR MASS: 169.75 G
LV LATERAL E/E' RATIO: 17.75 M/S
LV SEPTAL E/E' RATIO: 17.75 M/S
LVAD BASE RATE: 4900 RPM
LYMPHOCYTES # BLD AUTO: 0.5 K/UL (ref 1–4.8)
LYMPHOCYTES NFR BLD: 4.4 % (ref 18–48)
MAGNESIUM SERPL-MCNC: 1.9 MG/DL (ref 1.6–2.6)
MAGNESIUM SERPL-MCNC: 2 MG/DL (ref 1.6–2.6)
MAGNESIUM SERPL-MCNC: 2.2 MG/DL (ref 1.6–2.6)
MCH RBC QN AUTO: 29.2 PG (ref 27–31)
MCHC RBC AUTO-ENTMCNC: 33.2 G/DL (ref 32–36)
MCV RBC AUTO: 88 FL (ref 82–98)
MIN VOL: 8.5
MIN VOL: 8.5
MODE: ABNORMAL
MODE: NORMAL
MONOCYTES # BLD AUTO: 0.6 K/UL (ref 0.3–1)
MONOCYTES NFR BLD: 5.8 % (ref 4–15)
MV PEAK A VEL: 0.57 M/S
MV PEAK E VEL: 0.71 M/S
MV STENOSIS PRESSURE HALF TIME: 62 MS
MV VALVE AREA P 1/2 METHOD: 3.55 CM2
NEUTROPHILS # BLD AUTO: 9 K/UL (ref 1.8–7.7)
NEUTROPHILS NFR BLD: 86.4 % (ref 38–73)
NRBC BLD-RTO: 0 /100 WBC
NUM UNITS TRANS PACKED RBC: NORMAL
NUM UNITS TRANS PACKED RBC: NORMAL
PCO2 BLDA: 29.5 MMHG (ref 35–45)
PCO2 BLDA: 31.1 MMHG (ref 35–45)
PCO2 BLDA: 33.6 MMHG (ref 35–45)
PCO2 BLDA: 33.7 MMHG (ref 35–45)
PEEP: 5
PH SMN: 7.46 [PH] (ref 7.35–7.45)
PH SMN: 7.49 [PH] (ref 7.35–7.45)
PH SMN: 7.49 [PH] (ref 7.35–7.45)
PH SMN: 7.49 [PH] (ref 7.3–7.45)
PHOSPHATE SERPL-MCNC: 1.1 MG/DL (ref 2.7–4.5)
PHOSPHATE SERPL-MCNC: 1.3 MG/DL (ref 2.7–4.5)
PHOSPHATE SERPL-MCNC: 2 MG/DL (ref 2.7–4.5)
PIP: 20
PIP: 20
PLATELET # BLD AUTO: 246 K/UL (ref 150–450)
PMV BLD AUTO: 10.9 FL (ref 9.2–12.9)
PO2 BLDA: 108 MMHG (ref 80–100)
PO2 BLDA: 113 MMHG (ref 80–100)
PO2 BLDA: 155 MMHG (ref 80–100)
PO2 BLDA: 173 MMHG (ref 80–100)
PO2 BLDA: 26 MMHG (ref 40–60)
POC BASE DEFICIT: 0.1 MMOL/L (ref -2–2)
POC BE: -3 MMOL/L
POC BE: 2 MMOL/L
POC BE: 3 MMOL/L
POC COHB: 1.3 % (ref 0–9)
POC HCO3: 24.5 MMOL/L (ref 24–28)
POC METHB: 3 % (ref 0–3)
POC O2HB: 94.6 %
POC PERFORMED BY: ABNORMAL
POC SATURATED O2: 100 % (ref 95–100)
POC SATURATED O2: 100 % (ref 95–100)
POC SATURATED O2: 50 % (ref 95–100)
POC SATURATED O2: 98.9 % (ref 95–100)
POC SATURATED O2: 99 % (ref 95–100)
POC TCO2: 22 MMOL/L (ref 23–27)
POC TCO2: 26 MMOL/L (ref 23–27)
POC TCO2: 27 MMOL/L (ref 23–27)
POC TEMPERATURE: 37 C
POCT GLUCOSE: 180 MG/DL (ref 70–110)
POCT GLUCOSE: 194 MG/DL (ref 70–110)
POCT GLUCOSE: 215 MG/DL (ref 70–110)
POCT GLUCOSE: 236 MG/DL (ref 70–110)
POCT GLUCOSE: 246 MG/DL (ref 70–110)
POCT GLUCOSE: 283 MG/DL (ref 70–110)
POTASSIUM SERPL-SCNC: 4 MMOL/L (ref 3.5–5.1)
POTASSIUM SERPL-SCNC: 4 MMOL/L (ref 3.5–5.1)
POTASSIUM SERPL-SCNC: 4.1 MMOL/L (ref 3.5–5.1)
POTASSIUM SERPL-SCNC: 4.2 MMOL/L (ref 3.5–5.1)
POTASSIUM SERPL-SCNC: 4.3 MMOL/L (ref 3.5–5.1)
PROCALCITONIN SERPL IA-MCNC: 10.63 NG/ML
PROT SERPL-MCNC: 5.7 G/DL (ref 6–8.4)
PROTHROMBIN TIME: 11.4 SEC (ref 9–12.5)
PROTHROMBIN TIME: 11.6 SEC (ref 9–12.5)
PROTHROMBIN TIME: 11.8 SEC (ref 9–12.5)
PROTHROMBIN TIME: 11.9 SEC (ref 9–12.5)
PS: 5
PS: 5
RBC # BLD AUTO: 2.16 M/UL (ref 4.6–6.2)
SAMPLE: ABNORMAL
SAMPLE: NORMAL
SITE: ABNORMAL
SITE: NORMAL
SODIUM SERPL-SCNC: 140 MMOL/L (ref 136–145)
SODIUM SERPL-SCNC: 142 MMOL/L (ref 136–145)
SODIUM SERPL-SCNC: 142 MMOL/L (ref 136–145)
SP02: 98
SP02: 98
SPECIMEN OUTDATE: NORMAL
SPECIMEN SOURCE: ABNORMAL
SPONT RATE: 21
SPONT RATE: 21
TDI LATERAL: 0.04 M/S
TDI SEPTAL: 0.04 M/S
TDI: 0.04 M/S
TRIGL SERPL-MCNC: 310 MG/DL (ref 30–150)
VOL: 367
VOL: 367
VT: 450
VT: 450
WBC # BLD AUTO: 10.42 K/UL (ref 3.9–12.7)
Z-SCORE OF LEFT VENTRICULAR DIMENSION IN END DIASTOLE: -0.6
Z-SCORE OF LEFT VENTRICULAR DIMENSION IN END SYSTOLE: 3

## 2023-12-08 PROCEDURE — 63600175 PHARM REV CODE 636 W HCPCS: Performed by: INTERNAL MEDICINE

## 2023-12-08 PROCEDURE — 25000003 PHARM REV CODE 250

## 2023-12-08 PROCEDURE — 83605 ASSAY OF LACTIC ACID: CPT

## 2023-12-08 PROCEDURE — P9016 RBC LEUKOCYTES REDUCED: HCPCS

## 2023-12-08 PROCEDURE — 93750 INTERROGATION VAD IN PERSON: CPT | Mod: ,,, | Performed by: INTERNAL MEDICINE

## 2023-12-08 PROCEDURE — 25000003 PHARM REV CODE 250: Performed by: STUDENT IN AN ORGANIZED HEALTH CARE EDUCATION/TRAINING PROGRAM

## 2023-12-08 PROCEDURE — 95700 EEG CONT REC W/VID EEG TECH: CPT

## 2023-12-08 PROCEDURE — 83735 ASSAY OF MAGNESIUM: CPT | Mod: 91 | Performed by: STUDENT IN AN ORGANIZED HEALTH CARE EDUCATION/TRAINING PROGRAM

## 2023-12-08 PROCEDURE — 99291 CRITICAL CARE FIRST HOUR: CPT | Mod: ,,, | Performed by: REGISTERED NURSE

## 2023-12-08 PROCEDURE — 82330 ASSAY OF CALCIUM: CPT | Performed by: STUDENT IN AN ORGANIZED HEALTH CARE EDUCATION/TRAINING PROGRAM

## 2023-12-08 PROCEDURE — 63600175 PHARM REV CODE 636 W HCPCS: Performed by: STUDENT IN AN ORGANIZED HEALTH CARE EDUCATION/TRAINING PROGRAM

## 2023-12-08 PROCEDURE — 63600175 PHARM REV CODE 636 W HCPCS

## 2023-12-08 PROCEDURE — 85384 FIBRINOGEN ACTIVITY: CPT | Mod: 91 | Performed by: THORACIC SURGERY (CARDIOTHORACIC VASCULAR SURGERY)

## 2023-12-08 PROCEDURE — 27100171 HC OXYGEN HIGH FLOW UP TO 24 HOURS

## 2023-12-08 PROCEDURE — 99232 SBSQ HOSP IP/OBS MODERATE 35: CPT | Mod: ,,,

## 2023-12-08 PROCEDURE — 85730 THROMBOPLASTIN TIME PARTIAL: CPT | Mod: 91 | Performed by: THORACIC SURGERY (CARDIOTHORACIC VASCULAR SURGERY)

## 2023-12-08 PROCEDURE — 85610 PROTHROMBIN TIME: CPT | Mod: 91 | Performed by: THORACIC SURGERY (CARDIOTHORACIC VASCULAR SURGERY)

## 2023-12-08 PROCEDURE — 80076 HEPATIC FUNCTION PANEL: CPT | Performed by: STUDENT IN AN ORGANIZED HEALTH CARE EDUCATION/TRAINING PROGRAM

## 2023-12-08 PROCEDURE — 80100008 HC CRRT DAILY MAINTENANCE

## 2023-12-08 PROCEDURE — 63600175 PHARM REV CODE 636 W HCPCS: Performed by: THORACIC SURGERY (CARDIOTHORACIC VASCULAR SURGERY)

## 2023-12-08 PROCEDURE — 95714 VEEG EA 12-26 HR UNMNTR: CPT

## 2023-12-08 PROCEDURE — 20000000 HC ICU ROOM

## 2023-12-08 PROCEDURE — 84145 PROCALCITONIN (PCT): CPT | Performed by: INTERNAL MEDICINE

## 2023-12-08 PROCEDURE — 82803 BLOOD GASES ANY COMBINATION: CPT

## 2023-12-08 PROCEDURE — 80069 RENAL FUNCTION PANEL: CPT | Mod: 91 | Performed by: STUDENT IN AN ORGANIZED HEALTH CARE EDUCATION/TRAINING PROGRAM

## 2023-12-08 PROCEDURE — 25000003 PHARM REV CODE 250: Performed by: INTERNAL MEDICINE

## 2023-12-08 PROCEDURE — 84132 ASSAY OF SERUM POTASSIUM: CPT | Mod: 91 | Performed by: THORACIC SURGERY (CARDIOTHORACIC VASCULAR SURGERY)

## 2023-12-08 PROCEDURE — 99233 SBSQ HOSP IP/OBS HIGH 50: CPT | Mod: ,,, | Performed by: INTERNAL MEDICINE

## 2023-12-08 PROCEDURE — 25000003 PHARM REV CODE 250: Performed by: THORACIC SURGERY (CARDIOTHORACIC VASCULAR SURGERY)

## 2023-12-08 PROCEDURE — 99900026 HC AIRWAY MAINTENANCE (STAT)

## 2023-12-08 PROCEDURE — 83735 ASSAY OF MAGNESIUM: CPT | Mod: 91 | Performed by: THORACIC SURGERY (CARDIOTHORACIC VASCULAR SURGERY)

## 2023-12-08 PROCEDURE — 83880 ASSAY OF NATRIURETIC PEPTIDE: CPT | Performed by: STUDENT IN AN ORGANIZED HEALTH CARE EDUCATION/TRAINING PROGRAM

## 2023-12-08 PROCEDURE — 99291 CRITICAL CARE FIRST HOUR: CPT | Mod: ,,, | Performed by: ANESTHESIOLOGY

## 2023-12-08 PROCEDURE — 27200966 HC CLOSED SUCTION SYSTEM

## 2023-12-08 PROCEDURE — 90945 DIALYSIS ONE EVALUATION: CPT

## 2023-12-08 PROCEDURE — 51798 US URINE CAPACITY MEASURE: CPT

## 2023-12-08 PROCEDURE — 83050 HGB METHEMOGLOBIN QUAN: CPT

## 2023-12-08 PROCEDURE — 83615 LACTATE (LD) (LDH) ENZYME: CPT | Performed by: STUDENT IN AN ORGANIZED HEALTH CARE EDUCATION/TRAINING PROGRAM

## 2023-12-08 PROCEDURE — 99900035 HC TECH TIME PER 15 MIN (STAT)

## 2023-12-08 PROCEDURE — C9113 INJ PANTOPRAZOLE SODIUM, VIA: HCPCS

## 2023-12-08 PROCEDURE — 84478 ASSAY OF TRIGLYCERIDES: CPT | Performed by: THORACIC SURGERY (CARDIOTHORACIC VASCULAR SURGERY)

## 2023-12-08 PROCEDURE — 86920 COMPATIBILITY TEST SPIN: CPT

## 2023-12-08 PROCEDURE — 85025 COMPLETE CBC W/AUTO DIFF WBC: CPT | Performed by: THORACIC SURGERY (CARDIOTHORACIC VASCULAR SURGERY)

## 2023-12-08 PROCEDURE — 27000248 HC VAD-ADDITIONAL DAY

## 2023-12-08 PROCEDURE — 94761 N-INVAS EAR/PLS OXIMETRY MLT: CPT | Mod: XB

## 2023-12-08 PROCEDURE — 86140 C-REACTIVE PROTEIN: CPT | Performed by: STUDENT IN AN ORGANIZED HEALTH CARE EDUCATION/TRAINING PROGRAM

## 2023-12-08 PROCEDURE — 37799 UNLISTED PX VASCULAR SURGERY: CPT

## 2023-12-08 PROCEDURE — 36430 TRANSFUSION BLD/BLD COMPNT: CPT

## 2023-12-08 PROCEDURE — 94003 VENT MGMT INPAT SUBQ DAY: CPT

## 2023-12-08 PROCEDURE — 95720 EEG PHY/QHP EA INCR W/VEEG: CPT | Mod: ,,, | Performed by: PSYCHIATRY & NEUROLOGY

## 2023-12-08 PROCEDURE — 86850 RBC ANTIBODY SCREEN: CPT | Performed by: THORACIC SURGERY (CARDIOTHORACIC VASCULAR SURGERY)

## 2023-12-08 RX ORDER — MAGNESIUM SULFATE HEPTAHYDRATE 40 MG/ML
2 INJECTION, SOLUTION INTRAVENOUS
Status: DISPENSED | OUTPATIENT
Start: 2023-12-08 | End: 2023-12-09

## 2023-12-08 RX ORDER — INSULIN ASPART 100 [IU]/ML
3 INJECTION, SOLUTION INTRAVENOUS; SUBCUTANEOUS
Status: DISCONTINUED | OUTPATIENT
Start: 2023-12-08 | End: 2023-12-09

## 2023-12-08 RX ORDER — HYDROCODONE BITARTRATE AND ACETAMINOPHEN 500; 5 MG/1; MG/1
TABLET ORAL
Status: DISCONTINUED | OUTPATIENT
Start: 2023-12-08 | End: 2023-12-11

## 2023-12-08 RX ORDER — PROPOFOL 10 MG/ML
0-50 INJECTION, EMULSION INTRAVENOUS CONTINUOUS
Status: DISCONTINUED | OUTPATIENT
Start: 2023-12-08 | End: 2023-12-09

## 2023-12-08 RX ORDER — NICARDIPINE HYDROCHLORIDE 0.2 MG/ML
0-15 INJECTION INTRAVENOUS CONTINUOUS
Status: CANCELLED | OUTPATIENT
Start: 2023-12-08

## 2023-12-08 RX ORDER — NICARDIPINE HYDROCHLORIDE 0.2 MG/ML
0-15 INJECTION INTRAVENOUS CONTINUOUS
Status: DISCONTINUED | OUTPATIENT
Start: 2023-12-08 | End: 2023-12-11

## 2023-12-08 RX ADMIN — DEXMEDETOMIDINE HYDROCHLORIDE 1.4 MCG/KG/HR: 4 INJECTION, SOLUTION INTRAVENOUS at 11:12

## 2023-12-08 RX ADMIN — MIDODRINE HYDROCHLORIDE 5 MG: 5 TABLET ORAL at 08:12

## 2023-12-08 RX ADMIN — INSULIN ASPART 3 UNITS: 100 INJECTION, SOLUTION INTRAVENOUS; SUBCUTANEOUS at 07:12

## 2023-12-08 RX ADMIN — PANTOPRAZOLE SODIUM 40 MG: 40 INJECTION, POWDER, FOR SOLUTION INTRAVENOUS at 08:12

## 2023-12-08 RX ADMIN — ATORVASTATIN CALCIUM 40 MG: 10 TABLET, FILM COATED ORAL at 08:12

## 2023-12-08 RX ADMIN — PIPERACILLIN SODIUM AND TAZOBACTAM SODIUM 4.5 G: 4; .5 INJECTION, POWDER, FOR SOLUTION INTRAVENOUS at 02:12

## 2023-12-08 RX ADMIN — DEXMEDETOMIDINE HYDROCHLORIDE 0.07 MCG/KG/HR: 4 INJECTION, SOLUTION INTRAVENOUS at 02:12

## 2023-12-08 RX ADMIN — INSULIN ASPART 4 UNITS: 100 INJECTION, SOLUTION INTRAVENOUS; SUBCUTANEOUS at 04:12

## 2023-12-08 RX ADMIN — EPINEPHRINE 0.01 MCG/KG/MIN: 1 INJECTION INTRAMUSCULAR; INTRAVENOUS; SUBCUTANEOUS at 07:12

## 2023-12-08 RX ADMIN — HEPARIN SODIUM 1300 UNITS/HR: 10000 INJECTION, SOLUTION INTRAVENOUS at 07:12

## 2023-12-08 RX ADMIN — INSULIN ASPART 2 UNITS: 100 INJECTION, SOLUTION INTRAVENOUS; SUBCUTANEOUS at 07:12

## 2023-12-08 RX ADMIN — NICARDIPINE HYDROCHLORIDE 2.5 MG/HR: 0.2 INJECTION, SOLUTION INTRAVENOUS at 12:12

## 2023-12-08 RX ADMIN — SODIUM CHLORIDE: 9 INJECTION, SOLUTION INTRAVENOUS at 07:12

## 2023-12-08 RX ADMIN — SODIUM PHOSPHATE, MONOBASIC, MONOHYDRATE AND SODIUM PHOSPHATE, DIBASIC, ANHYDROUS 39.99 MMOL: 142; 276 INJECTION, SOLUTION INTRAVENOUS at 08:12

## 2023-12-08 RX ADMIN — SODIUM PHOSPHATE, MONOBASIC, MONOHYDRATE AND SODIUM PHOSPHATE, DIBASIC, ANHYDROUS 39.99 MMOL: 142; 276 INJECTION, SOLUTION INTRAVENOUS at 05:12

## 2023-12-08 RX ADMIN — INSULIN ASPART 6 UNITS: 100 INJECTION, SOLUTION INTRAVENOUS; SUBCUTANEOUS at 11:12

## 2023-12-08 RX ADMIN — SENNOSIDES AND DOCUSATE SODIUM 2 TABLET: 8.6; 5 TABLET ORAL at 08:12

## 2023-12-08 RX ADMIN — CALCIUM GLUCONATE: 98 INJECTION, SOLUTION INTRAVENOUS at 02:12

## 2023-12-08 RX ADMIN — EPINEPHRINE 0.02 MCG/KG/MIN: 1 INJECTION INTRAMUSCULAR; INTRAVENOUS; SUBCUTANEOUS at 02:12

## 2023-12-08 RX ADMIN — DEXMEDETOMIDINE HYDROCHLORIDE 1.4 MCG/KG/HR: 4 INJECTION, SOLUTION INTRAVENOUS at 08:12

## 2023-12-08 RX ADMIN — VASOPRESSIN 0.04 UNITS/MIN: 20 INJECTION INTRAVENOUS at 04:12

## 2023-12-08 RX ADMIN — Medication 324 MG: at 08:12

## 2023-12-08 RX ADMIN — CALCIUM GLUCONATE: 98 INJECTION, SOLUTION INTRAVENOUS at 09:12

## 2023-12-08 RX ADMIN — MAGNESIUM SULFATE HEPTAHYDRATE 2 G: 40 INJECTION, SOLUTION INTRAVENOUS at 05:12

## 2023-12-08 RX ADMIN — INSULIN ASPART 3 UNITS: 100 INJECTION, SOLUTION INTRAVENOUS; SUBCUTANEOUS at 04:12

## 2023-12-08 RX ADMIN — NICARDIPINE HYDROCHLORIDE 2.5 MG/HR: 0.2 INJECTION, SOLUTION INTRAVENOUS at 09:12

## 2023-12-08 RX ADMIN — HEPARIN SODIUM 1000 UNITS/HR: 10000 INJECTION, SOLUTION INTRAVENOUS at 06:12

## 2023-12-08 RX ADMIN — DEXMEDETOMIDINE HYDROCHLORIDE 1.4 MCG/KG/HR: 4 INJECTION, SOLUTION INTRAVENOUS at 05:12

## 2023-12-08 RX ADMIN — DEXTROSE MONOHYDRATE, SODIUM CITRATE, AND CITRIC ACID MONOHYDRATE: 2.45; 2.2; .8 INJECTION, SOLUTION INTRAVENOUS at 01:12

## 2023-12-08 RX ADMIN — CALCIUM GLUCONATE: 98 INJECTION, SOLUTION INTRAVENOUS at 05:12

## 2023-12-08 RX ADMIN — CALCIUM GLUCONATE: 98 INJECTION, SOLUTION INTRAVENOUS at 01:12

## 2023-12-08 RX ADMIN — DEXMEDETOMIDINE HYDROCHLORIDE 1 MCG/KG/HR: 4 INJECTION, SOLUTION INTRAVENOUS at 07:12

## 2023-12-08 RX ADMIN — DEXMEDETOMIDINE HYDROCHLORIDE 1.4 MCG/KG/HR: 4 INJECTION, SOLUTION INTRAVENOUS at 01:12

## 2023-12-08 RX ADMIN — PIPERACILLIN SODIUM AND TAZOBACTAM SODIUM 4.5 G: 4; .5 INJECTION, POWDER, FOR SOLUTION INTRAVENOUS at 12:12

## 2023-12-08 RX ADMIN — INSULIN ASPART 3 UNITS: 100 INJECTION, SOLUTION INTRAVENOUS; SUBCUTANEOUS at 11:12

## 2023-12-08 RX ADMIN — DOBUTAMINE IN DEXTROSE 5 MCG/KG/MIN: 400 INJECTION, SOLUTION INTRAVENOUS at 07:12

## 2023-12-08 RX ADMIN — WARFARIN SODIUM 2 MG: 2 TABLET ORAL at 05:12

## 2023-12-08 RX ADMIN — INSULIN ASPART 2 UNITS: 100 INJECTION, SOLUTION INTRAVENOUS; SUBCUTANEOUS at 08:12

## 2023-12-08 RX ADMIN — POLYETHYLENE GLYCOL 3350 17 G: 17 POWDER, FOR SOLUTION ORAL at 08:12

## 2023-12-08 RX ADMIN — AMIODARONE HYDROCHLORIDE 400 MG: 200 TABLET ORAL at 08:12

## 2023-12-08 RX ADMIN — DEXTROSE MONOHYDRATE, SODIUM CITRATE, AND CITRIC ACID MONOHYDRATE: 2.45; 2.2; .8 INJECTION, SOLUTION INTRAVENOUS at 09:12

## 2023-12-08 RX ADMIN — PIPERACILLIN SODIUM AND TAZOBACTAM SODIUM 4.5 G: 4; .5 INJECTION, POWDER, FOR SOLUTION INTRAVENOUS at 08:12

## 2023-12-08 NOTE — PROGRESS NOTES
Patient was seen today in the hospital. Family at bedside. MPU was setup for patient today and is now ready for use. MPU was placed back in his cart for use later once he is transferred to stepdown unit.

## 2023-12-08 NOTE — SUBJECTIVE & OBJECTIVE
Interval History: Patient seen and examined this AM. No acute events overnight although had to be transitioned to SLED from Prismax secondary to no 0 calcium with bicarbonate 32 mEq bags in stock currently. Afebrile with pulse ranging from 90-80s bpm. Systolic blood pressures ranging from 90-70s mmHg via arterial line on epinephrine 0.02 (in addition to dobutamine at 5 for ionotropic support and Cardene at 7.5). He is saturating +92% on FiO2 40% and PEEP 5 mmHg with no documented UOP in the last 24 hours. Net negative ~350 mL.    Review of patient's allergies indicates:  No Known Allergies  Current Facility-Administered Medications   Medication Frequency    0.9%  NaCl infusion (for blood administration) Q24H PRN    0.9%  NaCl infusion Continuous    acetaminophen tablet 650 mg Q6H PRN    albumin human 5% bottle 25 g PRN    albuterol sulfate nebulizer solution 2.5 mg Q4H PRN    amiodarone tablet 400 mg Daily    atorvastatin tablet 40 mg Daily    bisacodyL suppository 10 mg Daily PRN    calcium gluconate 3 g in dextrose 5 % (D5W) 100 mL infusion Continuous    dexmedetomidine (PRECEDEX) 400mcg/100mL 0.9% NaCL infusion Continuous    dextrose 10 % infusion Continuous PRN    dextrose 10% bolus 125 mL 125 mL PRN    dextrose 10% bolus 250 mL 250 mL PRN    dextrose-sod citrate-citric ac 2.45-2.2 gram- 800 mg/100 mL Soln Continuous    DOBUtamine 1000 mg in D5W 250 mL infusion Continuous    EPINEPHrine 5 mg in dextrose 5% 250 mL infusion (premix) Continuous    ferrous gluconate tablet 324 mg Daily with breakfast    glucagon (human recombinant) injection 1 mg PRN    heparin 25,000 units in dextrose 5% 250 mL (100 units/mL) infusion (heparin infusion - NO NOMOGRAM) Continuous    insulin aspart U-100 pen 0-10 Units Q4H PRN    magnesium hydroxide 400 mg/5 ml suspension 2,400 mg Daily PRN    magnesium sulfate 2g in water 50mL IVPB (premix) PRN    midodrine tablet 5 mg BID    niCARdipine 40 mg/200 mL (0.2 mg/mL) infusion Continuous     pantoprazole injection 40 mg BID    piperacillin-tazobactam (ZOSYN) 4.5 g in dextrose 5 % in water (D5W) 100 mL IVPB (MB+) Q8H    polyethylene glycol packet 17 g BID    potassium chloride 20 mEq in 100 mL IVPB (FOR CENTRAL LINE ADMINISTRATION ONLY) PRN    potassium chloride 20 mEq in 100 mL IVPB (FOR CENTRAL LINE ADMINISTRATION ONLY) PRN    QUEtiapine tablet 50 mg Q6H PRN    senna-docusate 8.6-50 mg per tablet 2 tablet BID    sodium chloride 0.9% flush 10 mL PRN    sodium phosphate 20.01 mmol in dextrose 5 % (D5W) 250 mL IVPB PRN    sodium phosphate 30 mmol in dextrose 5 % (D5W) 250 mL IVPB PRN    sodium phosphate 39.99 mmol in dextrose 5 % (D5W) 250 mL IVPB PRN    warfarin (COUMADIN) tablet 2 mg Daily       Objective:     Vital Signs (Most Recent):  Temp: 98.4 °F (36.9 °C) (12/08/23 0715)  Pulse: 83 (12/08/23 0723)  Resp: 20 (12/08/23 0723)  BP: (!) 76/0 (12/08/23 0300)  SpO2: 100 % (12/08/23 0812) Vital Signs (24h Range):  Temp:  [98.1 °F (36.7 °C)-99.6 °F (37.6 °C)] 98.4 °F (36.9 °C)  Pulse:  [] 83  Resp:  [20] 20  SpO2:  [92 %-100 %] 100 %  BP: (72-86)/(0) 76/0  Arterial Line BP: (71-98)/(56-73) 94/73     Weight: 87.1 kg (192 lb) (12/08/23 0500)  Body mass index is 26.04 kg/m².  Body surface area is 2.1 meters squared.    I/O last 3 completed shifts:  In: 4349.4 [I.V.:1858.5; NG/GT:565; IV Piggyback:1925.9]  Out: 5198 [Other:5118; Chest Tube:80]     Physical Exam  Vitals and nursing note reviewed.   Constitutional:       General: He is not in acute distress.     Appearance: He is ill-appearing. He is not diaphoretic.      Interventions: He is sedated and intubated.   HENT:      Head: Normocephalic and atraumatic.      Right Ear: External ear normal.      Left Ear: External ear normal.      Nose:      Comments: NG tube to left nostril.     Mouth/Throat:      Comments: ET tube in place.  Eyes:      General: No scleral icterus.        Right eye: No discharge.         Left eye: No discharge.       Conjunctiva/sclera: Conjunctivae normal.   Neck:      Comments: Trialysis catheter to left internal jugular vein. [place] Siva catheter in place.  Cardiovascular:      Rate and Rhythm: Normal rate.      Heart sounds: Murmur heard.      Systolic murmur is present.      No friction rub. No gallop.   Pulmonary:      Effort: Tachypnea present. He is intubated.      Breath sounds: Rales present. No wheezing.      Comments: Bibasilar crackles appreciated.   Chest:      Comments: Two chest tubes in place with LVAD present. Chest remains open at this time.  Abdominal:      General: Bowel sounds are normal. There is no distension.      Palpations: Abdomen is soft.   Genitourinary:     Comments: [place] catheter in place.  Musculoskeletal:      Cervical back: Neck supple.      Right lower leg: No edema.      Left lower leg: No edema.   Skin:     General: Skin is warm and dry.      Coloration: Skin is not jaundiced.   Neurological:      Mental Status: He is confused.      Comments: Unable to fully assess.   Psychiatric:      Comments: Unable to fully assess at this time.          Significant Labs:  ABGs:   Recent Labs   Lab 12/08/23 0322   PH 7.464*   PCO2 29.5*   HCO3 21.2*   POCSATURATED 99   BE -3*       BMP:   Recent Labs   Lab 12/08/23 0408   *      K 4.3      CO2 21*   BUN 13   CREATININE 1.1   CALCIUM 8.1*   MG 1.9       CBC:   Recent Labs   Lab 12/08/23 0408   WBC 10.42   RBC 2.16*   HGB 6.3*   HCT 19.0*      MCV 88   MCH 29.2   MCHC 33.2       CMP:   Recent Labs   Lab 12/08/23 0408   *   CALCIUM 8.1*   ALBUMIN 2.0*  2.0*   PROT 5.7*      K 4.3   CO2 21*      BUN 13   CREATININE 1.1   ALKPHOS 139*   ALT 26   AST 55*   BILITOT 0.8       Coagulation:   Recent Labs   Lab 12/08/23 0408   INR 1.1   APTT 34.3*       LFTs:   Recent Labs   Lab 12/08/23 0408   ALT 26   AST 55*   ALKPHOS 139*   BILITOT 0.8   PROT 5.7*   ALBUMIN 2.0*  2.0*       Microbiology Results (last 7 days)        Procedure Component Value Units Date/Time    AFB Culture & Smear [[id number]] Collected: 12/06/23 1532    Order Status: Completed Specimen: Body Fluid from Lung, Right Updated: 12/07/23 2127     AFB Culture & Smear Culture in progress     AFB CULTURE STAIN No acid fast bacilli seen.    Narrative:      Bronchial Wash    Culture, Respiratory [[id number]] Collected: 12/06/23 1531    Order Status: Completed Specimen: Respiratory from Bronchial Wash Updated: 12/07/23 0845     Respiratory Culture No Growth     Gram Stain (Respiratory) Moderate WBC's     Gram Stain (Respiratory) Rare Gram negative rods    Narrative:      Bronchial Wash    Direct AFB stain [[id number]] Collected: 12/06/23 1532    Order Status: Completed Specimen: Body Fluid from Lung, Right Updated: 12/06/23 2257     Direct Acid Fast No acid fast bacilli seen.    Narrative:      Bronchial Wash    Fungus culture [[id number]] Collected: 12/06/23 1532    Order Status: Sent Specimen: Body Fluid from Lung, Right Updated: 12/06/23 1719    Gram stain [[id number]] Collected: 12/06/23 1532    Order Status: Canceled Specimen: Body Fluid from Lung, Right     Blood culture [[id number]] Collected: 11/28/23 1452    Order Status: Completed Specimen: Blood from Peripheral, Upper Arm, Right Updated: 12/03/23 1612     Blood Culture, Routine No growth after 5 days.    Blood culture [[id number]] Collected: 11/28/23 1450    Order Status: Completed Specimen: Blood from Peripheral, Forearm, Right Updated: 12/03/23 1612     Blood Culture, Routine No growth after 5 days.          Specimen (24h ago, onward)      None          Significant Imaging:  I have reviewed all imagining in the last 24 hours.

## 2023-12-08 NOTE — SUBJECTIVE & OBJECTIVE
"Interval HPI:   No acute events overnight. TF increased to 40 mL/hr. Patient in room 46525/96738 A. Blood glucose stable. BG above goal on current insulin regimen (SSI ). Steroid use- None .   4 Days Post-Op  Renal function- Normal   Vasopressors-  None     Diet NPO Except for: Medication (per NG tube)     Eating:   NPO  Hypoglycemia and intervention: No  Fever: No  TPN and/or TF: Yes  If yes, type of TF/TPN and rate: TF increased overnight to 40 mL/hr     BP (!) 76/0 (BP Location: Left arm, Patient Position: Lying)   Pulse 86   Temp 98.4 °F (36.9 °C) (Oral)   Resp 20   Ht 6' (1.829 m)   Wt 87.1 kg (192 lb)   SpO2 100%   BMI 26.04 kg/m²     Labs Reviewed and Include    Recent Labs   Lab 12/08/23  0408   *   CALCIUM 8.1*   ALBUMIN 2.0*  2.0*   PROT 5.7*      K 4.3   CO2 21*      BUN 13   CREATININE 1.1   ALKPHOS 139*   ALT 26   AST 55*   BILITOT 0.8     Lab Results   Component Value Date    WBC 10.42 12/08/2023    HGB 6.3 (L) 12/08/2023    HCT 19.0 (LL) 12/08/2023    MCV 88 12/08/2023     12/08/2023     No results for input(s): "TSH", "FREET4" in the last 168 hours.  Lab Results   Component Value Date    HGBA1C 7.6 (H) 10/12/2023       Nutritional status:   Body mass index is 26.04 kg/m².  Lab Results   Component Value Date    ALBUMIN 2.0 (L) 12/08/2023    ALBUMIN 2.0 (L) 12/08/2023    ALBUMIN 1.9 (L) 12/07/2023     Lab Results   Component Value Date    PREALBUMIN 19 (L) 12/01/2023    PREALBUMIN 27 11/15/2023       Estimated Creatinine Clearance: 77.4 mL/min (based on SCr of 1.1 mg/dL).    Accu-Checks  Recent Labs     12/06/23  1612 12/06/23  1941 12/06/23  1944 12/07/23  0757 12/07/23  1205 12/07/23  1652 12/07/23  1941 12/08/23  0014 12/08/23  0406 12/08/23  0717   POCTGLUCOSE 133* 248* 155* 144* 179* 165* 209* 180* 236* 215*       Current Medications and/or Treatments Impacting Glycemic Control  Immunotherapy:    Immunosuppressants       None          Steroids:   Hormones (From " admission, onward)      None          Pressors:    Autonomic Drugs (From admission, onward)      Start     Stop Route Frequency Ordered    12/07/23 1430  EPINEPHrine 5 mg in dextrose 5% 250 mL infusion (premix)         -- IV Continuous 12/07/23 1428    12/07/23 0900  midodrine tablet 5 mg         -- PER NG TUBE 2 times daily 12/06/23 0947          Hyperglycemia/Diabetes Medications:   Antihyperglycemics (From admission, onward)      Start     Stop Route Frequency Ordered    12/08/23 1200  insulin aspart U-100 pen 3 Units         -- SubQ 6 times per day 12/08/23 0938    12/07/23 1329  insulin aspart U-100 pen 0-10 Units         -- SubQ Every 4 hours PRN 12/07/23 1231

## 2023-12-08 NOTE — PROGRESS NOTES
12/08/2023  Deni Frye    Current provider:  Yuri Washington MD    Device interrogation:      12/8/2023    10:00 AM 12/8/2023     9:00 AM 12/8/2023     8:00 AM 12/8/2023     7:00 AM 12/8/2023     6:00 AM 12/8/2023     5:00 AM 12/8/2023     4:00 AM   TXP LVAD INTERROGATIONS   Type HeartMate3 HeartMate3 HeartMate3 HeartMate3      Flow 3.9 3.8 4 3.9 3.5 3.8 3.8   Speed 4900 4900 4900 4900 4900 4900 4900   PI 3.8 3.7 3.4 4.2 5.5 4 3.7   Power ([place]) 3.2 3.2 3.3 3.3 3.2 3.3 3.2   LSL 4500 4500 4500 4500 4500 4500 4500   Pulsatility Intermittent pulse Intermittent pulse Intermittent pulse Intermittent pulse Intermittent pulse Intermittent pulse Intermittent pulse          Rounded on [facility] to ensure all mechanical assist device settings (IABP or VAD) were appropriate and all parameters were within limits.  I was able to ensure all back up equipment was present, the staff had no issues, and the Perfusion Department daily rounding was complete.      For implantable VADs: Interrogation of Ventricular assist device was performed with analysis of device parameters and review of device function. I have personally reviewed the interrogation findings and agree with findings as stated.     In emergency, the nursing units have been notified to contact the perfusion department either by:  Calling [phone] from 630am to 4pm Mon thru Fri, utilizing the On-Call Finder functionality of Epic and searching for Perfusion, or by contacting the hospital  from 4pm to 630am and on weekends and asking to speak with the perfusionist on call.    10:24 AM

## 2023-12-08 NOTE — PLAN OF CARE
SICU PLAN OF CARE NOTE    Dx: Acute on chronic combined systolic and diastolic CHF, NYHA class 4    Shift Events: Weaned off Ketty. 1 PRBC given for H/H 6.3/19 (second unit ordered, waiting for rounds to give). Electrolytes replaced. Tolerated CRRT overnight. 3 BMs.    Goals of Care: Plan for Echo, EEG today, evaluate for possible extubation    Neuro: Arouses to Voice, Moves All Extremities, and Confused. Does not follow commands. Sedated with precedex due to non-redirectable agitation     Vital Signs: BP (!) 76/0 (BP Location: Left arm, Patient Position: Lying)   Pulse 87   Temp 98.2 °F (36.8 °C) (Oral)   Resp (!) 28   Ht 6' (1.829 m)   Wt 87.1 kg (192 lb)   SpO2 100%   BMI 26.04 kg/m²     Cardiac: NSR 80s    LVAD: Speed 4900, Flows 3.5-4 LPM, PI 3.5-6, Power 3.2. No alarms this shift. Hematocrit set to 20 (lowest allowed set point)    Respiratory: Ventilator AC/VC 40% FiO2 with 5 PEEP    Diet: Tube Feeds tolerating, 40 mL/hr (goal).     Gtts: Precedex, Epinephrine, Dobutamine, and Heparin    Urine Output: Anuric, bladder scan this am with 41 cc    Drains: Chest Tube, total output 20 cc /  shift     Labs/Accuchecks: Daily labs, q4 Accucheck (correction given), Phos and Mag replaced.    Skin: No new skin breakdown noted. Turned q2 hours. Immerse mattress in use. CHG bath given. Dressings c/d/I.

## 2023-12-08 NOTE — ASSESSMENT & PLAN NOTE
Patient is identified as having Combined Systolic and Diastolic heart failure that is Acute on chronic. CHF is currently uncontrolled due to JVD, Rales/crackles on pulmonary exam, and Pulmonary edema/pleural effusion on CXR. Latest ECHO performed and demonstrates- Results for orders placed during the hospital encounter of 11/09/23    Echo    Interpretation Summary    Left Ventricle: The left ventricle is moderately dilated. Mildly increased ventricular mass. Normal wall thickness. There is eccentric hypertrophy. Severe global hyperkinesis present. There is severely reduced systolic function with a visually estimated ejection fraction of 10 -15%. Grade III diastolic dysfunction.    Right Ventricle: Normal right ventricular cavity size. Systolic function is normal. Pacemaker lead present in the ventricle.    Left Atrium: Left atrium is severely dilated.    Mitral Valve: There is annular and bileaflet tethering. There is moderate regurgitation with a centrally directed jet.    Tricuspid Valve: There is mild regurgitation with a centrally directed jet.    Pulmonary Artery: The estimated pulmonary artery systolic pressure is 41 mmHg.    IVC/SVC: Normal venous pressure at 3 mmHg.    last BNP reviewed- and noted below   Recent Labs   Lab 12/08/23  0408   *       - management per primary team  - LVAD for mechanical support in addition to dobutamine and epinephrine for ionotropic support  - UF with SLED, goal to be net negative ~1 liter in the next 24 hours

## 2023-12-08 NOTE — ASSESSMENT & PLAN NOTE
- suspect acute tubular injury secondary to hypotension/cardiogenic shock likely compounded by intra-arterial contrast and anemia with urinary sediment with granular casts  - will continue with continuous SLED for metabolic clearance and volume management, on citrate to prevent clotting   - Q8H RFPs, ionized calcium and magnesium levels per SLED protocol   - renal diet/tube feeds when not NPO, volume restriction per primary team  - strict I/O's and daily weights  - keep MAP > 65 mmHg  - renally all dose medications to eGFR  - avoid nephrotoxic agents wean feasible (i.e. NSAIDs, intra-arterial contrast, supra-therapeutic vancomycin levels, etc.)

## 2023-12-08 NOTE — ASSESSMENT & PLAN NOTE
Pt with known ICM with an EF of 25% on home  presented with decompensated HF.  Not in cardiogenic shock    RHC 11/14: RA 14, PA 70/35, PCWP 32, CO 4.1, CI 2.1.   Repeat RHC 11/20 RA RA  20  PA 60/29 (39)  PCWP 29    CO  4.6 l/min  CI  2.3 l/min/m2  2D echo 11/21 showed EF 15-20%, mild RV dysfunction, mild MR, LVEDD 6.9     Repeat on 11/24 showed EF 10-15%, RV mildly reduced, mild-mod MR, LVEDD 6.42  - Home  gtt at 5  - home GDMT: entresto 24/26mg BID (on hold 2/2 IVÁN), Hydralazine 25 q8h - held prior to LVAD implant   - home diuretics: Was taking lasix 40 bid  - Bridged to LVAD with IABP s/p HM3 implant 12/1        -PT/OT and nutrition   -s/p LVAD (12/1) Speed increased on 12/7 to 4900 RPMs  - Remains on  5, epi 0.01, Ketty off  - CTS primary  -f/u echo today

## 2023-12-08 NOTE — PLAN OF CARE
Recommendations     1. If renal formula warranted for IVÁN: Recommend Novasource renal 40 ml/hr to provide 1920 kcal, 87 g pro, 688 mL water, FWF per MD (96% EEN and 83% of EPN).                 - consider adding BenePRO BID if CRRT continues (PRO needs will be higher), provides 12g PRO.       2. Alternate TF recs: If new TF formula is medically warranted, recommend Impact Peptide 1.5 at trickle rate and slowly increase to goal rate of 55ml/hr to provide 1980 kcal, 124g PRO, and 1016ml FF, FWF per MD (100% of EEN/EPN).                 - renal friendly formula                 - Higher PRO formula     3. Monitor for s/s of intolerance such as residuals >500ml, n/v/d, or abdominal distension.                   - if diarrhea or loose stools occur, consider adding psyllium husk BID to help bulk up stools.      4. RD to monitor wt, tolerance, skin, labs.         Goals: Meet % of EEN/EPN by RD f/u date  Nutrition Goal Status: goal met  Communication of RD Recs:  (POC)

## 2023-12-08 NOTE — ASSESSMENT & PLAN NOTE
Lab Results   Component Value Date    CREATININE 1.1 12/08/2023     Avoid insulin stacking  Titrate insulin slowly

## 2023-12-08 NOTE — ASSESSMENT & PLAN NOTE
Neuro/Psych:   -- Sedation: none; seroquel 50 Q6 PRN for agitation  -- Pain: d/c all narcotics             Cardiac:   -- S/P redo sternotomy LVAD with Dr. Washington on 12/01, chest closed 12/4  -- MAP Goal: 70-80  -- Cardene PRN  -- Pressors: epi 0.02,  5; currently off vaso  -- midodrine 5 TID  -- Anti-HTNs: none  -- Rhythm: NSR  -- amiodarone 400mg daily  -- LVAD heartmate III: RPM 4800, flow 3.6  -- Beta blocker: Will start when appropriate  -- Statin: Atorvastatin 40 mg QD  -- No ASA at this time  -- heparin 1000/hr, PTT goal 35-45  -- coumadin 2mg      Pulmonary:   -- Goal SpO2 >92%  -- Ketty 10  -- Chest Tubes x 1 (med):   - output 40cc/24hr      Renal:  -- Trend BUN/Cr   -- Maintain [place], record strict Is/Os  -- on SLED overnight, removed 3.63L  -- net -0.324L/24 hrs  -- goal to be net neg 1.5-2L/24hr      Recent Labs   Lab 12/07/23  1413 12/07/23  2225 12/08/23  0408   BUN 24* 19 13   CREATININE 1.6* 1.4 1.1         FEN / GI:   -- Daily CMP, PRN K/Mag/Phos per protocol   -- Replace electrolytes as needed  -- Nutrition: TF at goal 40/hr, having BM  -- Bowel Regimen: Miralax, docusate      ID:   -- Afebrile  -- WBC stable  -- Abx: cefepime. NO vanc  -- vanc level 7.8    Recent Labs   Lab 12/07/23  0417 12/07/23  1206 12/08/23  0408   WBC 12.25 10.31 10.42         Heme/Onc:   -- Hgb 8.7 pre-operatively  -- received 7 RBC, 2 FFP, 1 platelet, 10 cry intra-op  -- 12/1: 1 RBC, 4 FFP, 2 plts, 1 cryo  -- q4 CBC and coags  -- q4 fibrinogen - keep fibrinogen >300  -- holding ASA  -- 1u pRBC transfusing    Recent Labs   Lab 12/07/23  0417 12/07/23  0826 12/07/23  1206 12/07/23  1655 12/08/23  0015 12/08/23  0408   HGB 6.8*  --  7.0*  --   --  6.3*     --  249  --   --  246   APTT 33.6*   < > 27.7 33.8* 34.5* 34.3*   INR 1.1  --  1.1 1.1 1.1 1.1    < > = values in this interval not displayed.         Endocrine:   -- CTS Goal -140  -- HgbA1c: 7.6  -- Endocrinology consulted for insulin management      PPx:    Feeding:TF at goal  Analgesia/Sedation: propofol/seroquel 50 Q6  Thromboembolic Prevention: SCDs  HOB >30: Yes  Stress Ulcer: pantoprazole   Glucose Control: Yes, insulin management per Endocrinology  Bowel reg: Miralax, docusate  Invasive Lines/Drains/Airway:   ETT  OGT  Left radial arterial line  RIJ mac w/ slick + swan   LIJ quad CVC  Chest Tubes: 1 Mediastinal  LVAD     Dispo/Code Status/Palliative:     - Continue SICU Care    - Full Code

## 2023-12-08 NOTE — SUBJECTIVE & OBJECTIVE
Interval History: Patient POD #8 s/p HM3 LVAD implant. No acute events overnight. Nodding head and tracking with eyes on Precedex today. EEG being placed. Tolerated SLED overnight. Ketty weaned off and Epi is down to 0.01. On and off Cardene. 2D echo is pending after speed increase yesterday.       Continuous Infusions:   sodium chloride 0.9% Stopped (12/05/23 1634)    calcium gluconate 3 g in dextrose 5 % (D5W) 100 mL infusion      dexmedeTOMIDine (Precedex) infusion (titrating) 1.4 mcg/kg/hr (12/08/23 1128)    dextrose 10 % in water (D10W)      dextrose-sod citrate-citric ac      DOBUTamine 5 mcg/kg/min (12/08/23 1000)    EPINEPHrine 0.01 mcg/kg/min (12/08/23 1008)    heparin (porcine) in 5 % dex 1,000 Units/hr (12/08/23 1000)    nicardipine Stopped (12/08/23 0947)     Scheduled Meds:   amiodarone  400 mg Per NG tube Daily    atorvastatin  40 mg Per NG tube Daily    ferrous gluconate  324 mg Per NG tube Daily with breakfast    insulin aspart U-100  3 Units Subcutaneous 6 times per day    pantoprazole  40 mg Intravenous BID    piperacillin-tazobactam (Zosyn) IV (PEDS and ADULTS) (extended infusion is not appropriate)  4.5 g Intravenous Q8H    polyethylene glycol  17 g Per NG tube BID    senna-docusate 8.6-50 mg  2 tablet Per NG tube BID    warfarin  2 mg Per NG tube Daily     PRN Meds:0.9%  NaCl infusion (for blood administration), acetaminophen, albumin human 5%, albuterol sulfate, bisacodyL, dextrose 10 % in water (D10W), dextrose 10%, dextrose 10%, glucagon (human recombinant), insulin aspart U-100, magnesium hydroxide 400 mg/5 ml, magnesium sulfate IVPB, potassium chloride, potassium chloride, QUEtiapine, Flushing PICC/Midline Protocol **AND** [DISCONTINUED] sodium chloride 0.9% **AND** sodium chloride 0.9%, sodium phosphate 20.01 mmol in dextrose 5 % (D5W) 250 mL IVPB, sodium phosphate 30 mmol in dextrose 5 % (D5W) 250 mL IVPB, sodium phosphate 39.99 mmol in dextrose 5 % (D5W) 250 mL IVPB    Review of patient's  allergies indicates:  No Known Allergies  Objective:     Vital Signs (Most Recent):  Temp: 98.5 °F (36.9 °C) (12/08/23 1115)  Pulse: 84 (12/08/23 1130)  Resp: (!) 24 (12/08/23 1130)  BP: (!) 76/0 (12/08/23 0300)  SpO2: 100 % (12/08/23 1130) Vital Signs (24h Range):  Temp:  [98.1 °F (36.7 °C)-99.6 °F (37.6 °C)] 98.5 °F (36.9 °C)  Pulse:  [] 84  Resp:  [20-24] 24  SpO2:  [92 %-100 %] 100 %  BP: (72-86)/(0) 76/0  Arterial Line BP: (71-98)/(56-73) 92/69     Patient Vitals for the past 72 hrs (Last 3 readings):   Weight   12/08/23 0500 87.1 kg (192 lb)   12/07/23 0501 95 kg (209 lb 7 oz)   12/07/23 0500 95 kg (209 lb 7 oz)       Body mass index is 26.04 kg/m².      Intake/Output Summary (Last 24 hours) at 12/8/2023 1214  Last data filed at 12/8/2023 1100  Gross per 24 hour   Intake 4349.67 ml   Output 5352 ml   Net -1002.33 ml         Hemodynamic Parameters:       Telemetry: NSR        Physical Exam  Constitutional:       Appearance: He is ill-appearing.      Comments: Intubated and sedated    HENT:      Head: Normocephalic and atraumatic.   Eyes:      Pupils: Pupils are equal, round, and reactive to light.   Cardiovascular:      Rate and Rhythm: Normal rate and regular rhythm.      Comments: LVAD hum  Pulmonary:      Breath sounds: Rhonchi and rales present.   Chest:      Comments: CT x1  Abdominal:      General: Abdomen is flat. Bowel sounds are normal. There is no distension.      Palpations: Abdomen is soft.   Musculoskeletal:         General: Normal range of motion.      Cervical back: Neck supple.   Skin:     General: Skin is warm and dry.   Neurological:      Comments: Not following commands. Jessica            Significant Labs:  CBC:  Recent Labs   Lab 12/07/23  0417 12/07/23  1206 12/07/23 1937 12/08/23  0408   WBC 12.25 10.31  --  10.42   RBC 2.35* 2.28*  --  2.16*   HGB 6.8* 7.0*  --  6.3*   HCT 20.0* 20.6* 18* 19.0*    249  --  246   MCV 85 90  --  88   MCH 28.9 30.7  --  29.2   MCHC 34.0 34.0  --   33.2       BNP:  Recent Labs   Lab 12/04/23  0412 12/06/23  0410 12/08/23  0408   * 1,679* 695*       CMP:  Recent Labs   Lab 12/06/23  0410 12/06/23  1128 12/07/23  0417 12/07/23  0758 12/07/23  1413 12/07/23  1655 12/07/23  2225 12/08/23  0408 12/08/23  1110   GLU 98  96   < > 128*  --  144*  --  182* 209*  --    CALCIUM 8.3*  8.3*   < > 8.4*  --  8.2*  --  7.9* 8.1*  --    ALBUMIN 2.2*  2.2*  2.2*   < > 2.2*  --  2.0*  --  1.9* 2.0*  2.0*  --    PROT 5.8*  5.9*  --  6.1  --   --   --   --  5.7*  --      140   < > 139  --  140  --  139 140  --    K 4.2  4.2   < > 5.1   < > 4.3   < > 4.4 4.3 4.0   CO2 19*  22*   < > 15*  --  16*  --  22* 21*  --      106   < > 105  --  105  --  105 106  --    BUN 33*  27*   < > 22  --  24*  --  19 13  --    CREATININE 2.0*  1.6*   < > 1.5*  --  1.6*  --  1.4 1.1  --    ALKPHOS 115  121  --  129  --   --   --   --  139*  --    ALT 17  17  --  33  --   --   --   --  26  --    AST 49*  47*  --  79*  --   --   --   --  55*  --    BILITOT 0.9  0.9  --  0.7  --   --   --   --  0.8  --     < > = values in this interval not displayed.        Coagulation:   Recent Labs   Lab 12/08/23  0015 12/08/23  0408 12/08/23  0716 12/08/23  1110   INR 1.1 1.1  --  1.1   APTT 34.5* 34.3* 33.4* 28.7       LDH:  Recent Labs   Lab 12/06/23  0410 12/07/23  0417 12/08/23  0408   * 591* 526*       Microbiology:  Microbiology Results (last 7 days)       Procedure Component Value Units Date/Time    Culture, Respiratory [[id number]] Collected: 12/06/23 1531    Order Status: Completed Specimen: Respiratory from Bronchial Wash Updated: 12/08/23 1103     Respiratory Culture No growth     Gram Stain (Respiratory) Moderate WBC's     Gram Stain (Respiratory) Rare Gram negative rods    Narrative:      Bronchial Wash    AFB Culture & Smear [[id number]] Collected: 12/06/23 1532    Order Status: Completed Specimen: Body Fluid from Lung, Right Updated: 12/07/23 2127     AFB Culture  & Smear Culture in progress     AFB CULTURE STAIN No acid fast bacilli seen.    Narrative:      Bronchial Wash    Direct AFB stain [[id number]] Collected: 12/06/23 1532    Order Status: Completed Specimen: Body Fluid from Lung, Right Updated: 12/06/23 2257     Direct Acid Fast No acid fast bacilli seen.    Narrative:      Bronchial Wash    Fungus culture [[id number]] Collected: 12/06/23 1532    Order Status: Sent Specimen: Body Fluid from Lung, Right Updated: 12/06/23 1719    Gram stain [[id number]] Collected: 12/06/23 1532    Order Status: Canceled Specimen: Body Fluid from Lung, Right     Blood culture [[id number]] Collected: 11/28/23 1452    Order Status: Completed Specimen: Blood from Peripheral, Upper Arm, Right Updated: 12/03/23 1612     Blood Culture, Routine No growth after 5 days.    Blood culture [[id number]] Collected: 11/28/23 1450    Order Status: Completed Specimen: Blood from Peripheral, Forearm, Right Updated: 12/03/23 1612     Blood Culture, Routine No growth after 5 days.            BMP:   Recent Labs   Lab 12/08/23  0408 12/08/23  1110   *  --      --    K 4.3 4.0     --    CO2 21*  --    BUN 13  --    CREATININE 1.1  --    CALCIUM 8.1*  --    MG 1.9 2.2       I have reviewed all pertinent labs within the past 24 hours.    Estimated Creatinine Clearance: 77.4 mL/min (based on SCr of 1.1 mg/dL).    Diagnostic Results:  I have reviewed all pertinent imaging results/findings within the past 24 hours.

## 2023-12-08 NOTE — PROGRESS NOTES
Pt awake, tracking me with his eyes.  No family at bedside.  Unable to perform VAD education at this time.

## 2023-12-08 NOTE — PROGRESS NOTES
[facility] - Surgical Intensive Care  Infectious Disease  Progress Note    Patient Name: Radha Abbott  MRN: [id number]  Admission Date: 11/9/2023  Length of Stay: 29 days  Attending Physician: Yuri Washington MD  Primary Care Provider: Vasu Kong MD    Isolation Status: No active isolations    Assessment/Plan:      64 yo man s/p destination therapy left ventricular assist device placement. Postoperatively the chest was left open due to diffuse coagulopathy and RV dysfunction.  He did well post-operatively and underwent washout and closure on 12/4. The patient was extubated but had to be reintubated. A BAL was performed: Gram stain with GNB, culture is NGTD. PCT elevated at 10.    Recommendations  Continue empiric Zosyn  Follow-up PCT   Await final respiratory culture results  Will de-escalate quickly as allowed by patient's clinical response to therapy  Discussed with family     Albert Burt MD  Infectious Disease  [facility] - Surgical Intensive Care    Subjective:     Principal Problem:Acute on chronic combined systolic and diastolic CHF, NYHA class 4    HPI:  Mr. bAbott is a 63-year-old man who underwent destination therapy left ventricular assist device placement.  Postoperatively the chest was left open due to diffuse coagulopathy in RV dysfunction.  He did well post-operatively and underwent washout and closure on 12/4. The patient was extubated but had to be reintubated. A BAL was performed: Gram stain with GNB, culture is NGTD. ID is consulted for BAL results. The patient is agitated but quiets down with reassurance. No fever or chills. Ecgo done this am - results pending. Family at bedside.     Interval History: Events noted. EEG monitoring in place. Squeezes hand.     Review of Systems   Unable to perform ROS: Other     Objective:     Vital Signs (Most Recent):  Temp: 98.5 °F (36.9 °C) (12/08/23 1115)  Pulse: 84 (12/08/23 1230)  Resp: (!) 24 (12/08/23 1130)  BP: (!) 78/0 (12/08/23 1200)  SpO2:  "100 % (12/08/23 1230) Vital Signs (24h Range):  Temp:  [98.1 °F (36.7 °C)-99.6 °F (37.6 °C)] 98.5 °F (36.9 °C)  Pulse:  [82-94] 84  Resp:  [20-24] 24  SpO2:  [92 %-100 %] 100 %  BP: (72-86)/(0) 78/0  Arterial Line BP: (71-98)/(56-73) 87/73     Weight: 87.1 kg (192 lb)  Body mass index is 26.04 kg/m².    Estimated Creatinine Clearance: 77.4 mL/min (based on SCr of 1.1 mg/dL).     Physical Exam  Vitals and nursing note reviewed.   Constitutional:       Appearance: Normal appearance.   Cardiovascular:      Rate and Rhythm: Normal rate.      Comments: hum  Pulmonary:      Breath sounds: No wheezing or rhonchi.   Abdominal:      Tenderness: There is no guarding or rebound.   Skin:     Findings: No erythema or rash.          Significant Labs: Blood Culture:   Recent Labs   Lab 11/28/23  1450 11/28/23  1452   LABBLOO No growth after 5 days. No growth after 5 days.     CBC:   Recent Labs   Lab 12/07/23  0417 12/07/23  1206 12/07/23  1937 12/08/23  0408   WBC 12.25 10.31  --  10.42   HGB 6.8* 7.0*  --  6.3*   HCT 20.0* 20.6* 18* 19.0*    249  --  246     Procalcitonin:   Recent Labs   Lab 12/08/23  0408   PROCAL 10.63*     Respiratory Culture:   Recent Labs   Lab 12/06/23  1531   GSRESP Moderate WBC's  Rare Gram negative rods   RESPIRATORYC No growth     Urine Culture: No results for input(s): "LABURIN" in the last 4320 hours.  Wound Culture: No results for input(s): "LABAERO" in the last 4320 hours.    Significant Imaging: I have reviewed all pertinent imaging results/findings within the past 24 hours.  "

## 2023-12-08 NOTE — ASSESSMENT & PLAN NOTE
BG goal 140-180. TF @ 40 mL/hr.     - TF increased to 40 mL per hour and BG excursions noted. Will start scheduled novolog 3 units every four hours.   - BG checks q4hr while on TF/NPO   - Novolog (aspart) insulin prn for BG excursions [facility] (150/25)   - Hypoglycemia protocol in place    ** Please notify Endocrine for any change and/or advance in diet**  ** Please call Endocrine for any BG related issues **    Discharge Planning:   TBD. Please notify endocrinology prior to discharge.

## 2023-12-08 NOTE — PT/OT/SLP PROGRESS
Physical Therapy      Patient Name:  Radha Abbott   MRN:  [id number]    Patient not seen today. Pt remains intubated and on CRRT with pressor support. Pt with an EEG placed. Will follow-up when appropriate.

## 2023-12-08 NOTE — PROGRESS NOTES
12/08/23 1331   Treatment   Treatment Type SLED   Treatment Status Restart   Dialysis Machine Number [id number]   Solutions Labeled and Current  Yes   Access Temporary Cath;Left;IJ   Catheter Dressing Intact  Yes   Alarms Engaged Yes   CRRT Comments CRRT Restarted   Prescription   Time (Hours) Continuous  (24hr)   Dialysate K + (mEq/L) 4   Dialysate CA + (mEq/L) 2.25   Dialysate HCO3 - (Bicarb) (mEq/L) Other (Comment)  (32)   Dialysate Na + (mEq/L) 140   Cartridge Type Other  (R300)   Dialysate Flow Rate (mL/min) 200   UF Goal Rate 600 mL/hr   CRRT Hourly Documentation   Blood Flow (mL/min) 150   UF Rate 200 cc/hr   Arterial Pressure (mmHg) -40 mmHg   Venous Pressure (mmHg) 30 mmHg   Effluent Pressure (EP) (mmHg) 30 mmHg     CRRT Restarted. UF rate at 200 ml/hr with goal rate 60 ml/hr. Primary nurse will titrate to goal rate of 600 ml/hr as tolerated.

## 2023-12-08 NOTE — ASSESSMENT & PLAN NOTE
IVÁN on CKD2. Baseline Cr of 1.7-2.0.   - Hold ARNi, entresto  -Trend BMPs daily   -SrCr 1.1/BUN 13 today  -SLED/SCUF for volume removal   -Nephrology following

## 2023-12-08 NOTE — PROGRESS NOTES
[facility] - Surgical Intensive Care  Adult Nutrition  Progress Note    SUMMARY       Recommendations    1. If renal formula warranted for IVÁN: Recommend Novasource renal 40 ml/hr to provide 1920 kcal, 87 g pro, 688 mL water, FWF per MD (96% EEN and 83% of EPN).     - consider adding BenePRO BID if CRRT continues (PRO needs will be higher), provides 12g PRO.      2. Alternate TF recs: If new TF formula is medically warranted, recommend Impact Peptide 1.5 at trickle rate and slowly increase to goal rate of 55ml/hr to provide 1980 kcal, 124g PRO, and 1016ml FF, FWF per MD (100% of EEN/EPN).     - renal friendly formula     - Higher PRO formula    3. Monitor for s/s of intolerance such as residuals >500ml, n/v/d, or abdominal distension.       - if diarrhea or loose stools occur, consider adding psyllium husk BID to help bulk up stools.     4. RD to monitor wt, tolerance, skin, labs.       Goals: Meet % of EEN/EPN by RD f/u date  Nutrition Goal Status: goal met  Communication of RD Recs:  (POC)    Assessment and Plan    Nutrition Problem  Increased protein needs      Related to (etiology):   Physiological needs      Signs and Symptoms (as evidenced by):   HF     Interventions (treatment strategy):  Collaboration of nutrition care w/ other providers   LVAD placed 12/1     Nutrition Diagnosis Status:   Continues      Reason for Assessment    Reason For Assessment: RD follow-up  Diagnosis: cardiac disease (CHF/ s/p LVAD on 12/1)  Relevant Medical History: CAD, DMT2, CHF, PAF  Interdisciplinary Rounds: did not attend  General Information Comments: RD f/u: CRRT initiated on 12/5. Pt was re-intubated on 12/6. Pt remains intubated, on vent.  TF's were initiated on 12/6, running at goal of 40ml/hr. RD spoke with pt's RN, RN stated no to low residuals. 3 BM's noted last night (loose). EPN were re-evaluated based on initiation of CRRT (PRO needs will be higher). RD to continue to monitor and f/u.  Nutrition Discharge  Planning: Cardiac diet education provided 11/15- no additional questions for me at this time.    Nutrition Risk Screen    Nutrition Risk Screen: tube feeding or parenteral nutrition    Nutrition/Diet History    Spiritual, Cultural Beliefs, Hinduism Practices, Values that Affect Care: no  Food Allergies: NKFA    Anthropometrics    Temp: 98.5 °F (36.9 °C)  Height Method: Stated  Height: 6' (182.9 cm)  Height (inches): 72 in  Weight Method: Bed Scale  Weight: 87.1 kg (192 lb)  Weight (lb): 192 lb  Ideal Body Weight (IBW), Male: 178 lb  % Ideal Body Weight, Male (lb): 97.19 %  BMI (Calculated): 26  BMI Grade: 25 - 29.9 - overweight    Lab/Procedures/Meds    Pertinent Labs Reviewed: reviewed  Pertinent Labs Comments: Gluc: 209, Ca: 8.1, phos: 1.1, PRO: 5.7, alb: 2.0, AST: 55, CRP: 297.9, trigs: 310  Pertinent Medications Reviewed: reviewed  Pertinent Medications Comments: Amiodarone, statin, Fe, pantoprazole, abx, senna-docusate, NaCl, polyethylene glycol, coumadin, dobutamine, Ca Gluconate, epi, precedex, lasix, heparin, insulin, nicardipine, dextrose-sod citrate-citric ac    Estimated/Assessed Needs    Weight Used For Calorie Calculations: 87.1 kg (192 lb 0.3 oz)  Energy Calorie Requirements (kcal): 1995 kcal  Energy Need Method: [facility]  Protein Requirements: 105- 131g (1.2-1.5g/kg (CRRT))  Weight Used For Protein Calculations: 87.1 kg (192 lb 0.3 oz)  Fluid Requirements (mL): 1 ml/kcal or per MD     RDA Method (mL): 1995    Nutrition Prescription Ordered    Current Diet Order: NPO  Current Nutrition Support Formula Ordered: Novasource Renal  Current Nutrition Support Rate Ordered: 40 (ml)  Current Nutrition Support Frequency Ordered: ml/hr x 24hrs    Evaluation of Received Nutrient/Fluid Intake    Enteral Calories (kcal): 1920  Enteral Protein (gm): 87  Enteral (Free Water) Fluid (mL): 688  % Kcal Needs: 96%  % Protein Needs: 84%  I/O: +85.2ml since 12/1  Energy Calories Required: meeting needs  Protein Required:  meeting needs  Fluid Required:  (Per MD)  Total Fluid Intake (mL/kg): 1 ml/kcal or per MD  Comments: LBM 12/7 (loose)  Tolerance: tolerating  % Intake of Estimated Energy Needs: 75 - 100 %  % Meal Intake: NPO    Nutrition Risk    Level of Risk/Frequency of Follow-up:  (RD to f/u x 1-2/week)     Monitor and Evaluation    Food and Nutrient Intake: energy intake, food and beverage intake, enteral nutrition intake, parenteral nutrition intake  Food and Nutrient Adminstration: diet order, enteral and parenteral nutrition administration  Knowledge/Beliefs/Attitudes: food and nutrition knowledge/skill, beliefs and attitudes  Physical Activity and Function: nutrition-related ADLs and IADLs, factors affecting access to physical activity  Anthropometric Measurements: height/length, weight, weight change, body mass index, growth pattern indices/percentile ranks  Biochemical Data, Medical Tests and Procedures: electrolyte and renal panel, gastrointestinal profile, glucose/endocrine profile, inflammatory profile, lipid profile  Nutrition-Focused Physical Findings: overall appearance, extremities, muscles and bones, head and eyes, skin     Nutrition Follow-Up    RD Follow-up?: Yes

## 2023-12-08 NOTE — SUBJECTIVE & OBJECTIVE
"Interval History: Events noted. EEG monitoring in place. Squeezes hand.     Review of Systems   Unable to perform ROS: Other     Objective:     Vital Signs (Most Recent):  Temp: 98.5 °F (36.9 °C) (12/08/23 1115)  Pulse: 84 (12/08/23 1230)  Resp: (!) 24 (12/08/23 1130)  BP: (!) 78/0 (12/08/23 1200)  SpO2: 100 % (12/08/23 1230) Vital Signs (24h Range):  Temp:  [98.1 °F (36.7 °C)-99.6 °F (37.6 °C)] 98.5 °F (36.9 °C)  Pulse:  [82-94] 84  Resp:  [20-24] 24  SpO2:  [92 %-100 %] 100 %  BP: (72-86)/(0) 78/0  Arterial Line BP: (71-98)/(56-73) 87/73     Weight: 87.1 kg (192 lb)  Body mass index is 26.04 kg/m².    Estimated Creatinine Clearance: 77.4 mL/min (based on SCr of 1.1 mg/dL).     Physical Exam  Vitals and nursing note reviewed.   Constitutional:       Appearance: Normal appearance.   Cardiovascular:      Rate and Rhythm: Normal rate.      Comments: hum  Pulmonary:      Breath sounds: No wheezing or rhonchi.   Abdominal:      Tenderness: There is no guarding or rebound.   Skin:     Findings: No erythema or rash.          Significant Labs: Blood Culture:   Recent Labs   Lab 11/28/23  1450 11/28/23  1452   LABBLOO No growth after 5 days. No growth after 5 days.     CBC:   Recent Labs   Lab 12/07/23  0417 12/07/23  1206 12/07/23  1937 12/08/23  0408   WBC 12.25 10.31  --  10.42   HGB 6.8* 7.0*  --  6.3*   HCT 20.0* 20.6* 18* 19.0*    249  --  246     Procalcitonin:   Recent Labs   Lab 12/08/23  0408   PROCAL 10.63*     Respiratory Culture:   Recent Labs   Lab 12/06/23  1531   GSRESP Moderate WBC's  Rare Gram negative rods   RESPIRATORYC No growth     Urine Culture: No results for input(s): "LABURIN" in the last 4320 hours.  Wound Culture: No results for input(s): "LABAERO" in the last 4320 hours.    Significant Imaging: I have reviewed all pertinent imaging results/findings within the past 24 hours.  "

## 2023-12-08 NOTE — PROGRESS NOTES
[facility]  Critical Care - Surgery  Progress Note    Patient Name: Radha Abbott  MRN: [id number]  Admission Date: 11/9/2023  Hospital Length of Stay: 29 days  Code Status: Full Code  Attending Provider: Yuri Washington MD  Primary Care Provider: Vasu Kong MD   Principal Problem: Acute on chronic combined systolic and diastolic CHF, NYHA class 4    Subjective:     Hospital/ICU Course:  No notes on file    Interval History/Significant Events: Precedex on, propofol off. Still no purposeful movements, can track with eyes. TF to goal, having BM. NO off. Adjusted dialysate bath with more bicarbonate. Tolerating SLED overnight. Removed 1 chest tube.    Follow-up For: Procedure(s) (LRB):  CLOSURE, WOUND, STERNUM (N/A)  INSERTION, GRAFT, PERICARDIUM  DRAINAGE, PLEURAL EFFUSION    Post-Operative Day: 4 Days Post-Op    Objective:     Vital Signs (Most Recent):  Temp: 98.4 °F (36.9 °C) (12/08/23 0715)  Pulse: 84 (12/08/23 0715)  Resp: (!) 28 (12/06/23 0500)  BP: (!) 76/0 (12/08/23 0300)  SpO2: 100 % (12/08/23 0715) Vital Signs (24h Range):  Temp:  [98.1 °F (36.7 °C)-99.6 °F (37.6 °C)] 98.4 °F (36.9 °C)  Pulse:  [] 84  SpO2:  [92 %-100 %] 100 %  BP: (72-86)/(0) 76/0  Arterial Line BP: (71-98)/(56-73) 94/73     Weight: 87.1 kg (192 lb)  Body mass index is 26.04 kg/m².      Intake/Output Summary (Last 24 hours) at 12/8/2023 0736  Last data filed at 12/8/2023 0700  Gross per 24 hour   Intake 3691.89 ml   Output 3935 ml   Net -243.11 ml          Physical Exam  Vitals and nursing note reviewed.   Constitutional:       General: He is not in acute distress.     Appearance: Normal appearance.   HENT:      Head: Normocephalic and atraumatic.      Mouth/Throat:      Mouth: Mucous membranes are moist.      Pharynx: Oropharynx is clear.   Cardiovascular:      Rate and Rhythm: Normal rate and regular rhythm.      Comments: LVAD in place  Pulmonary:      Effort: Pulmonary effort is normal. No  respiratory distress.      Breath sounds: Normal breath sounds.      Comments: Intubated  Vent Mode: A/C  Oxygen Concentration (%):  [] 50  Resp Rate Total:  [18 br/min-58 br/min] 22 br/min  Vt Set:  [420 mL-450 mL] 450 mL  PEEP/CPAP:  [5 cmH20] 5 cmH20  Mean Airway Pressure:  [7.6 cmH20-15 cmH20] 9.3 cmH20     Abdominal:      General: Abdomen is flat. There is no distension.      Palpations: Abdomen is soft.      Tenderness: There is no abdominal tenderness.      Comments: NGT in place   Genitourinary:     Comments: goodman  Musculoskeletal:         General: Normal range of motion.      Right lower leg: No edema.      Left lower leg: No edema.   Skin:     General: Skin is warm and dry.            Vents:  Vent Mode: A/C (12/08/23 0320)  Ventilator Initiated: Yes (12/06/23 1233)  Set Rate: 18 BPM (12/08/23 0320)  Vt Set: 450 mL (12/08/23 0320)  Pressure Support: 5 cmH20 (12/05/23 0930)  PEEP/CPAP: 5 cmH20 (12/08/23 0320)  Oxygen Concentration (%): 40 (12/08/23 0715)  Peak Airway Pressure: 28 cmH20 (12/08/23 0320)  Plateau Pressure: 18 cmH20 (12/08/23 0320)  Total Ve: 10.2 L/m (12/08/23 0320)  Negative Inspiratory Force (cm H2O): 0 (12/08/23 0320)  F/VT Ratio<105 (RSBI): (!) 39.01 (12/04/23 1803)    Lines/Drains/Airways       Peripherally Inserted Central Catheter Line  Duration             PICC Triple Lumen 12/05/23 1208 right basilic 2 days              Central Venous Catheter Line  Duration             Trialysis (Dialysis) Catheter 12/01/23 1530 left internal jugular 6 days              Drain  Duration                  Chest Tube 12/01/23 Tube - 2 Left Mediastinal 7 days         NG/OG Tube 12/04/23 1730 Left nostril 3 days              Airway  Duration                  Airway - Non-Surgical 12/06/23 1220 Endotracheal Tube 1 day              Arterial Line  Duration             Arterial Line 12/06/23 1526 Left Radial 1 day              Line  Duration                  VAD 12/01/23 1015 Left ventricular assist  device HeartMate 3 6 days              Peripheral Intravenous Line  Duration                  Peripheral IV - Single Lumen 12/08/23 0620 20 G Left Forearm <1 day                    Significant Labs:    CBC/Anemia Profile:  Recent Labs   Lab 12/07/23  0417 12/07/23  1206 12/07/23  1937 12/08/23  0408   WBC 12.25 10.31  --  10.42   HGB 6.8* 7.0*  --  6.3*   HCT 20.0* 20.6* 18* 19.0*    249  --  246   MCV 85 90  --  88   RDW 16.0* 16.1*  --  15.9*        Chemistries:  Recent Labs   Lab 12/07/23  0417 12/07/23  0758 12/07/23  1413 12/07/23  1655 12/07/23  2225 12/08/23  0408     --  140  --  139 140   K 5.1   < > 4.3 4.4 4.4 4.3     --  105  --  105 106   CO2 15*  --  16*  --  22* 21*   BUN 22  --  24*  --  19 13   CREATININE 1.5*  --  1.6*  --  1.4 1.1   CALCIUM 8.4*  --  8.2*  --  7.9* 8.1*   ALBUMIN 2.2*  --  2.0*  --  1.9* 2.0*  2.0*   PROT 6.1  --   --   --   --  5.7*   BILITOT 0.7  --   --   --   --  0.8   ALKPHOS 129  --   --   --   --  139*   ALT 33  --   --   --   --  26   AST 79*  --   --   --   --  55*   MG 2.4   < > 2.3 2.4 2.1 1.9   PHOS 4.6*  --  4.0  --  2.2* 1.1*    < > = values in this interval not displayed.           Significant Imaging:  I have reviewed all pertinent imaging results/findings within the past 24 hours.  Assessment/Plan:     Cardiac/Vascular  * Acute on chronic combined systolic and diastolic CHF, NYHA class 4    Neuro/Psych:   -- Sedation: none; seroquel 50 Q6 PRN for agitation  -- Pain: d/c all narcotics             Cardiac:   -- S/P redo sternotomy LVAD with Dr. Washington on 12/01, chest closed 12/4  -- MAP Goal: 70-80  -- Cardene PRN  -- Pressors: epi 0.02,  5; currently off vaso  -- midodrine 5 TID  -- Anti-HTNs: none  -- Rhythm: NSR  -- amiodarone 400mg daily  -- LVAD heartmate III: RPM 4800, flow 3.6  -- Beta blocker: Will start when appropriate  -- Statin: Atorvastatin 40 mg QD  -- No ASA at this time  -- heparin 1000/hr, PTT goal 35-45  -- coumadin 2mg       Pulmonary:   -- Goal SpO2 >92%  -- Ketty 10  -- Chest Tubes x 1 (med):   - output 40cc/24hr      Renal:  -- Trend BUN/Cr   -- Maintain [place], record strict Is/Os  -- on SLED overnight, removed 3.63L  -- net -0.324L/24 hrs  -- goal to be net neg 1.5-2L/24hr      Recent Labs   Lab 12/07/23  1413 12/07/23  2225 12/08/23  0408   BUN 24* 19 13   CREATININE 1.6* 1.4 1.1         FEN / GI:   -- Daily CMP, PRN K/Mag/Phos per protocol   -- Replace electrolytes as needed  -- Nutrition: TF at goal 40/hr, having BM  -- Bowel Regimen: Miralax, docusate      ID:   -- Afebrile  -- WBC stable  -- Abx: cefepime. NO vanc  -- vanc level 7.8    Recent Labs   Lab 12/07/23  0417 12/07/23  1206 12/08/23  0408   WBC 12.25 10.31 10.42         Heme/Onc:   -- Hgb 8.7 pre-operatively  -- received 7 RBC, 2 FFP, 1 platelet, 10 cry intra-op  -- 12/1: 1 RBC, 4 FFP, 2 plts, 1 cryo  -- q4 CBC and coags  -- q4 fibrinogen - keep fibrinogen >300  -- holding ASA  -- 1u pRBC transfusing    Recent Labs   Lab 12/07/23  0417 12/07/23  0826 12/07/23  1206 12/07/23  1655 12/08/23  0015 12/08/23  0408   HGB 6.8*  --  7.0*  --   --  6.3*     --  249  --   --  246   APTT 33.6*   < > 27.7 33.8* 34.5* 34.3*   INR 1.1  --  1.1 1.1 1.1 1.1    < > = values in this interval not displayed.         Endocrine:   -- CTS Goal -140  -- HgbA1c: 7.6  -- Endocrinology consulted for insulin management      PPx:   Feeding:TF at goal  Analgesia/Sedation: propofol/seroquel 50 Q6  Thromboembolic Prevention: SCDs  HOB >30: Yes  Stress Ulcer: pantoprazole   Glucose Control: Yes, insulin management per Endocrinology  Bowel reg: Miralax, docusate  Invasive Lines/Drains/Airway:   ETT  OGT  Left radial arterial line  RIJ mac w/ slick + swan   LIJ quad CVC  Chest Tubes: 1 Mediastinal  LVAD     Dispo/Code Status/Palliative:     - Continue SICU Care    - Full Code           Critical care was time spent personally by me on the following activities: development of treatment plan  with patient or surrogate and bedside caregivers, discussions with consultants, evaluation of patient's response to treatment, examination of patient, ordering and performing treatments and interventions, ordering and review of laboratory studies, ordering and review of radiographic studies, pulse oximetry, re-evaluation of patient's condition.  This critical care time did not overlap with that of any other provider or involve time for any procedures.     Melanie Padilla MD  Critical Care - Surgery  [facility]

## 2023-12-08 NOTE — NURSING
MD Washington notified of Pt MAP 65-68. Order for 0.04 vaso verbally given over phone. Will continue to monitor

## 2023-12-08 NOTE — SUBJECTIVE & OBJECTIVE
Interval History/Significant Events: Precedex on, propofol off. Still no purposeful movements, can track with eyes. TF to goal, having BM. NO off. Adjusted dialysate bath with more bicarbonate. Tolerating SLED overnight. Removed 1 chest tube.    Follow-up For: Procedure(s) (LRB):  CLOSURE, WOUND, STERNUM (N/A)  INSERTION, GRAFT, PERICARDIUM  DRAINAGE, PLEURAL EFFUSION    Post-Operative Day: 4 Days Post-Op    Objective:     Vital Signs (Most Recent):  Temp: 98.4 °F (36.9 °C) (12/08/23 0715)  Pulse: 84 (12/08/23 0715)  Resp: (!) 28 (12/06/23 0500)  BP: (!) 76/0 (12/08/23 0300)  SpO2: 100 % (12/08/23 0715) Vital Signs (24h Range):  Temp:  [98.1 °F (36.7 °C)-99.6 °F (37.6 °C)] 98.4 °F (36.9 °C)  Pulse:  [] 84  SpO2:  [92 %-100 %] 100 %  BP: (72-86)/(0) 76/0  Arterial Line BP: (71-98)/(56-73) 94/73     Weight: 87.1 kg (192 lb)  Body mass index is 26.04 kg/m².      Intake/Output Summary (Last 24 hours) at 12/8/2023 0736  Last data filed at 12/8/2023 0700  Gross per 24 hour   Intake 3691.89 ml   Output 3935 ml   Net -243.11 ml          Physical Exam  Vitals and nursing note reviewed.   Constitutional:       General: He is not in acute distress.     Appearance: Normal appearance.   HENT:      Head: Normocephalic and atraumatic.      Mouth/Throat:      Mouth: Mucous membranes are moist.      Pharynx: Oropharynx is clear.   Cardiovascular:      Rate and Rhythm: Normal rate and regular rhythm.      Comments: LVAD in place  Pulmonary:      Effort: Pulmonary effort is normal. No respiratory distress.      Breath sounds: Normal breath sounds.      Comments: Intubated  Vent Mode: A/C  Oxygen Concentration (%):  [] 50  Resp Rate Total:  [18 br/min-58 br/min] 22 br/min  Vt Set:  [420 mL-450 mL] 450 mL  PEEP/CPAP:  [5 cmH20] 5 cmH20  Mean Airway Pressure:  [7.6 cmH20-15 cmH20] 9.3 cmH20     Abdominal:      General: Abdomen is flat. There is no distension.      Palpations: Abdomen is soft.      Tenderness: There is no  abdominal tenderness.      Comments: NGT in place   Genitourinary:     Comments: goodman  Musculoskeletal:         General: Normal range of motion.      Right lower leg: No edema.      Left lower leg: No edema.   Skin:     General: Skin is warm and dry.            Vents:  Vent Mode: A/C (12/08/23 0320)  Ventilator Initiated: Yes (12/06/23 1233)  Set Rate: 18 BPM (12/08/23 0320)  Vt Set: 450 mL (12/08/23 0320)  Pressure Support: 5 cmH20 (12/05/23 0930)  PEEP/CPAP: 5 cmH20 (12/08/23 0320)  Oxygen Concentration (%): 40 (12/08/23 0715)  Peak Airway Pressure: 28 cmH20 (12/08/23 0320)  Plateau Pressure: 18 cmH20 (12/08/23 0320)  Total Ve: 10.2 L/m (12/08/23 0320)  Negative Inspiratory Force (cm H2O): 0 (12/08/23 0320)  F/VT Ratio<105 (RSBI): (!) 39.01 (12/04/23 1803)    Lines/Drains/Airways       Peripherally Inserted Central Catheter Line  Duration             PICC Triple Lumen 12/05/23 1208 right basilic 2 days              Central Venous Catheter Line  Duration             Trialysis (Dialysis) Catheter 12/01/23 1530 left internal jugular 6 days              Drain  Duration                  Chest Tube 12/01/23 Tube - 2 Left Mediastinal 7 days         NG/OG Tube 12/04/23 1730 Left nostril 3 days              Airway  Duration                  Airway - Non-Surgical 12/06/23 1220 Endotracheal Tube 1 day              Arterial Line  Duration             Arterial Line 12/06/23 1526 Left Radial 1 day              Line  Duration                  VAD 12/01/23 1015 Left ventricular assist device HeartMate 3 6 days              Peripheral Intravenous Line  Duration                  Peripheral IV - Single Lumen 12/08/23 0620 20 G Left Forearm <1 day                    Significant Labs:    CBC/Anemia Profile:  Recent Labs   Lab 12/07/23  0417 12/07/23  1206 12/07/23  1937 12/08/23  0408   WBC 12.25 10.31  --  10.42   HGB 6.8* 7.0*  --  6.3*   HCT 20.0* 20.6* 18* 19.0*    249  --  246   MCV 85 90  --  88   RDW 16.0* 16.1*  --   15.9*        Chemistries:  Recent Labs   Lab 12/07/23  0417 12/07/23  0758 12/07/23  1413 12/07/23  1655 12/07/23  2225 12/08/23  0408     --  140  --  139 140   K 5.1   < > 4.3 4.4 4.4 4.3     --  105  --  105 106   CO2 15*  --  16*  --  22* 21*   BUN 22  --  24*  --  19 13   CREATININE 1.5*  --  1.6*  --  1.4 1.1   CALCIUM 8.4*  --  8.2*  --  7.9* 8.1*   ALBUMIN 2.2*  --  2.0*  --  1.9* 2.0*  2.0*   PROT 6.1  --   --   --   --  5.7*   BILITOT 0.7  --   --   --   --  0.8   ALKPHOS 129  --   --   --   --  139*   ALT 33  --   --   --   --  26   AST 79*  --   --   --   --  55*   MG 2.4   < > 2.3 2.4 2.1 1.9   PHOS 4.6*  --  4.0  --  2.2* 1.1*    < > = values in this interval not displayed.           Significant Imaging:  I have reviewed all pertinent imaging results/findings within the past 24 hours.

## 2023-12-08 NOTE — SUBJECTIVE & OBJECTIVE
Past Medical History:   Diagnosis Date    CAD (coronary artery disease)     Diabetes mellitus     HFrEF (heart failure with reduced ejection fraction)     ICD (implantable cardioverter-defibrillator) in place     MI, old        Past Surgical History:   Procedure Laterality Date    ANGIOPLASTY-VENOUS ARTERY Right 12/1/2023    Procedure: ANGIOPLASTY-VENOUS ARTERY, RIGHT FEMORAL;  Surgeon: Yuri Washington MD;  Location: [facility] OR [address] [place] [address];  Service: Cardiovascular;  Laterality: Right;    AORTIC VALVULOPLASTY N/A 12/1/2023    Procedure: REPAIR, AORTIC VALVE;  Surgeon: Yuri Washington MD;  [facility];  Service: Cardiovascular;  Laterality: N/A;    CARDIAC SURGERY      CLOSURE N/A 12/1/2023    Procedure: CLOSURE, TEMPORARY;  Surgeon: Yuri Washington MD;  [facility];  Service: Cardiovascular;  Laterality: N/A;    DRAINAGE OF PLEURAL EFFUSION  12/4/2023    Procedure: DRAINAGE, PLEURAL EFFUSION;  Surgeon: Yuri Washington MD;  Location: [facility] OR [address] [place] [address];  Service: Cardiovascular;;    INSERTION OF GRAFT TO PERICARDIUM  12/4/2023    Procedure: INSERTION, GRAFT, PERICARDIUM;  Surgeon: Yuri Washington MD;  [facility];  Service: Cardiovascular;;    INSERTION OF INTRA-AORTIC BALLOON ASSIST DEVICE Right 11/21/2023    Procedure: INSERTION, INTRA-AORTIC BALLOON PUMP;  Surgeon: Finn Cohn MD;  Location: [facility] CATH LAB;  Service: Cardiology;  Laterality: Right;    LEFT VENTRICULAR ASSIST DEVICE Left 12/1/2023    Procedure: INSERTION-LEFT VENTRICULAR ASSIST DEVICE;  Surgeon: Yuri Washington MD;  Location: [facility] OR [address] [place] [address];  Service: Cardiovascular;  Laterality: Left;  REDO STERNOTOMY - REDO SAW NEEDED FOR CASE    LYSIS OF ADHESIONS  12/1/2023    Procedure: LYSIS, ADHESIONS;  Surgeon: Yuri Washington MD;  Location: [facility] OR [address] [place] [address];  Service: Cardiovascular;;    PLACEMENT OF SWAN ROLANDO CATHETER WITH IMAGING GUIDANCE  11/20/2023    Procedure: INSERTION, CATHETER, SWAN-ROLANDO, WITH  IMAGING GUIDANCE;  Surgeon: Sajan Hurley MD;  Location: [facility] CATH LAB;  Service: Cardiology;;    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 12/1/2023    Procedure: REPAIR, ANEURYSM, ARTERY, FEMORAL;  Surgeon: Yuri Washington MD;  [facility];  Service: Cardiovascular;  Laterality: Right;  Right Femoral Artery Repair    RIGHT HEART CATHETERIZATION Right 10/10/2023    Procedure: INSERTION, CATHETER, RIGHT HEART;  Surgeon: Bin Gandhi MD;  Location: [facility] CATH LAB;  Service: Cardiology;  Laterality: Right;    RIGHT HEART CATHETERIZATION Right 10/13/2023    Procedure: INSERTION, CATHETER, RIGHT HEART;  Surgeon: Walter Mcintyre MD;  Location: [facility] CATH LAB;  Service: Cardiology;  Laterality: Right;    RIGHT HEART CATHETERIZATION  11/13/2023    RIGHT HEART CATHETERIZATION Right 11/13/2023    Procedure: INSERTION, CATHETER, RIGHT HEART;  Surgeon: Juventino Bermudez Jr., MD;  Location: [facility] CATH LAB;  Service: Cardiology;  Laterality: Right;    RIGHT HEART CATHETERIZATION Right 11/20/2023    Procedure: INSERTION, CATHETER, RIGHT HEART;  Surgeon: Sajan Hurley MD;  Location: [facility] CATH LAB;  Service: Cardiology;  Laterality: Right;    STERNAL WOUND CLOSURE N/A 12/4/2023    Procedure: CLOSURE, WOUND, STERNUM;  Surgeon: Yuri Washington MD;  Location: [address];  Service: Cardiovascular;  Laterality: N/A;    STERNOTOMY N/A 12/1/2023    Procedure: STERNOTOMY, REDO;  Surgeon: Yuri Washington MD;  [address]  Service: Cardiovascular;  Laterality: N/A;    VALVULOPLASTY, MITRAL VALVE N/A 12/1/2023    Procedure: VALVULOPLASTY, MITRAL VALVE;  Surgeon: Yuri Washington MD;  Location: [facility] OR [address] [place] [address];  Service: Cardiovascular;  Laterality: N/A;       Review of patient's allergies indicates:  No Known Allergies    No current facility-administered medications on file prior to encounter.     Current Outpatient Medications on File Prior to Encounter   Medication Sig     amiodarone (PACERONE) 400 MG tablet Take 1 tablet (400 mg total) by mouth once daily.    apixaban (ELIQUIS) 5 mg Tab Take 1 tablet (5 mg total) by mouth 2 (two) times daily.    aspirin (ECOTRIN) 81 MG EC tablet Take 81 mg by mouth once daily.    atorvastatin (LIPITOR) 40 MG tablet Take 40 mg by mouth.    DOBUTamine (DOBUTREX) 1,000 mg/250 mL (4,000 mcg/mL) infusion Inject 389.5 mcg/min into the vein continuous.    fish oil-omega-3 fatty acids 300-1,000 mg capsule Take 2 g by mouth once daily.    furosemide (LASIX) 40 MG tablet Take 1 tablet (40 mg total) by mouth 2 (two) times daily.    gabapentin (NEURONTIN) 300 MG capsule Take 300 mg by mouth nightly as needed.    multivitamin capsule Take 1 capsule by mouth once daily.    pantoprazole (PROTONIX) 40 MG tablet Take 1 tablet (40 mg total) by mouth before breakfast.    potassium chloride SA (K-DUR,KLOR-CON) 20 MEQ tablet Take 1 tablet (20 mEq total) by mouth once daily.    sacubitriL-valsartan (ENTRESTO) 24-26 mg per tablet Take 1 tablet by mouth 2 (two) times daily.    metOLazone (ZAROXOLYN) 2.5 MG tablet Take 1 tablet (2.5 mg total) by mouth once daily. Thursday 11/9 and Saturday 11/10    torsemide (DEMADEX) 20 MG Tab Take 2 tablets (40 mg total) by mouth 2 (two) times a day.     Family History    None       Tobacco Use    Smoking status: Every Day     Current packs/day: 0.50     Types: Cigarettes    Smokeless tobacco: Not on file   Substance and Sexual Activity    Alcohol use: Yes     Comment: rarely    Drug use: No    Sexual activity: Not on file     Review of Systems   Unable to perform ROS: Intubated     Objective:     Vital Signs (Most Recent):  Temp: 99.6 °F (37.6 °C) (12/07/23 1900)  Pulse: 89 (12/07/23 2100)  Resp: (!) 28 (12/06/23 0500)  BP: (!) 84/0 (12/07/23 1900)  SpO2: 100 % (12/07/23 2100) Vital Signs (24h Range):  Temp:  [98.8 °F (37.1 °C)-99.6 °F (37.6 °C)] 99.6 °F (37.6 °C)  Pulse:  [] 89  SpO2:  [92 %-100 %] 100 %  BP:  (72-84)/(0) 84/0  Arterial Line BP: (71-92)/(56-72) 72/63     Weight: 95 kg (209 lb 7 oz)  Body mass index is 28.4 kg/m².     Physical Exam  Vitals and nursing note reviewed.   Constitutional:       Appearance: He is ill-appearing.      Interventions: He is sedated and intubated.   Pulmonary:      Effort: He is intubated.             -Unreliable neurologic exam due to sedation.   -Mental Status: Does not open eyes to voice/touch/pain. Follows no commands.  -Cranial Nerves: Does not blink to threat. Pupils equal, round, and reactive. Bilateral corneals intact. Intact bilateral oculocephalics. Cough intact.  Motor/Sensation: Withdraws to painful stimuli in all 4 extremities  Reflexes: Plantar responses down.  Unable to test orientation, language, memory, judgment, insight, fund of knowledge, hearing, shoulder shrug, tongue protrusion, coordination, gait due to level of consciousness.     Significant Labs:   Recent Lab Results  (Last 5 results in the past 24 hours)        12/07/23  1942   12/07/23  1941   12/07/23  1937   12/07/23  1814   12/07/23  1655        Allens Test N/A     N/A           aPTT         33.8  Comment: Refer to local heparin nomogram for intensity/dose specific   therapeutic   range.         Site Ste[facility]     Stef/[facility]           Calcium Level Ionized       1.04         Fibrinogen         678       INR         1.1  Comment: Coumadin Therapy:  2.0 - 3.0 for INR for all indicators except mechanical heart valves  and antiphospholipid syndromes which should use 2.5 - 3.5.         Magnesium          2.4       POC BE     -9           POC HCO3     16.4           POC Hematocrit     18           POC Ionized Calcium     1.11           POC Lactate 0.33               POC PCO2     27.2           POC PH     7.390           POC PO2     167           Potassium, Blood Gas     4.4           POC SATURATED O2     100           Sodium, Blood Gas     136           POC TCO2     17           POCT Glucose   209              Potassium         4.4       Protime         11.7       Sample ARTERIAL     ARTERIAL                                All pertinent lab results from the past 24 hours have been reviewed.    Significant Imaging: I have reviewed all pertinent imaging results/findings within the past 24 hours.

## 2023-12-08 NOTE — PROGRESS NOTES
[facility]  Nephrology  Progress Note    Patient Name: Radha Abbott  MRN: [id number]  Admission Date: 11/9/2023  Hospital Length of Stay: 29 days  Attending Provider: Yuri Washington MD   Primary Care Physician: Vasu Kong MD  Principal Problem:Acute on chronic combined systolic and diastolic CHF, NYHA class 4    Subjective:     HPI: Mr. Abbott is a 63-year-old man with ischemic cardiomyopathy with most recent TTE showing EF 10 -15% and G3DD s/p [employer] ICM placement on chronic dobutamine infusion, CAD s/p x3 vessel CABG back in 2009, type 2 diabetes mellitus (most recent hemoglobin A1c 7.6%), hypertension, HLD, history of ventricular fibrillation for which he is on amiodarone and CKD IIIb (baseline creatinine ~2-2.2) who was initially admitted on 11/9 with heart failure exacerbation and hypervolemia for which he was managed with inotrope with dobutamine in addition to IV diuresis. He ultimately underwent IABP placed on 11/21 for mechanical hemodynamic support and subsequently underwent LVAD placement on 12/1. His renal function appears to be mostly stable with IVÁN and creatinine in mid 2s to 3s in addition he is still making urine with Lasix infusion however on 12/5 Nephrology consulted given concern for uremia with BUN 72.     Interval History: Patient seen and examined this AM. No acute events overnight although had to be transitioned to SLED from Prismax secondary to no 0 calcium with bicarbonate 32 mEq bags in stock currently. Afebrile with pulse ranging from 90-80s bpm. Systolic blood pressures ranging from 90-70s mmHg via arterial line on epinephrine 0.02 (in addition to dobutamine at 5 for ionotropic support and Cardene at 7.5). He is saturating +92% on FiO2 40% and PEEP 5 mmHg with no documented UOP in the last 24 hours. Net negative ~350 mL.    Review of patient's allergies indicates:  No Known Allergies  Current Facility-Administered Medications   Medication Frequency     0.9%  NaCl infusion (for blood administration) Q24H PRN    0.9%  NaCl infusion Continuous    acetaminophen tablet 650 mg Q6H PRN    albumin human 5% bottle 25 g PRN    albuterol sulfate nebulizer solution 2.5 mg Q4H PRN    amiodarone tablet 400 mg Daily    atorvastatin tablet 40 mg Daily    bisacodyL suppository 10 mg Daily PRN    calcium gluconate 3 g in dextrose 5 % (D5W) 100 mL infusion Continuous    dexmedetomidine (PRECEDEX) 400mcg/100mL 0.9% NaCL infusion Continuous    dextrose 10 % infusion Continuous PRN    dextrose 10% bolus 125 mL 125 mL PRN    dextrose 10% bolus 250 mL 250 mL PRN    dextrose-sod citrate-citric ac 2.45-2.2 gram- 800 mg/100 mL Soln Continuous    DOBUtamine 1000 mg in D5W 250 mL infusion Continuous    EPINEPHrine 5 mg in dextrose 5% 250 mL infusion (premix) Continuous    ferrous gluconate tablet 324 mg Daily with breakfast    glucagon (human recombinant) injection 1 mg PRN    heparin 25,000 units in dextrose 5% 250 mL (100 units/mL) infusion (heparin infusion - NO NOMOGRAM) Continuous    insulin aspart U-100 pen 0-10 Units Q4H PRN    magnesium hydroxide 400 mg/5 ml suspension 2,400 mg Daily PRN    magnesium sulfate 2g in water 50mL IVPB (premix) PRN    midodrine tablet 5 mg BID    niCARdipine 40 mg/200 mL (0.2 mg/mL) infusion Continuous    pantoprazole injection 40 mg BID    piperacillin-tazobactam (ZOSYN) 4.5 g in dextrose 5 % in water (D5W) 100 mL IVPB (MB+) Q8H    polyethylene glycol packet 17 g BID    potassium chloride 20 mEq in 100 mL IVPB (FOR CENTRAL LINE ADMINISTRATION ONLY) PRN    potassium chloride 20 mEq in 100 mL IVPB (FOR CENTRAL LINE ADMINISTRATION ONLY) PRN    QUEtiapine tablet 50 mg Q6H PRN    senna-docusate 8.6-50 mg per tablet 2 tablet BID    sodium chloride 0.9% flush 10 mL PRN    sodium phosphate 20.01 mmol in dextrose 5 % (D5W) 250 mL IVPB PRN    sodium phosphate 30 mmol in dextrose 5 % (D5W) 250 mL IVPB PRN    sodium phosphate 39.99 mmol in dextrose 5 % (D5W) 250 mL  IVPB PRN    warfarin (COUMADIN) tablet 2 mg Daily       Objective:     Vital Signs (Most Recent):  Temp: 98.4 °F (36.9 °C) (12/08/23 0715)  Pulse: 83 (12/08/23 0723)  Resp: 20 (12/08/23 0723)  BP: (!) 76/0 (12/08/23 0300)  SpO2: 100 % (12/08/23 0812) Vital Signs (24h Range):  Temp:  [98.1 °F (36.7 °C)-99.6 °F (37.6 °C)] 98.4 °F (36.9 °C)  Pulse:  [] 83  Resp:  [20] 20  SpO2:  [92 %-100 %] 100 %  BP: (72-86)/(0) 76/0  Arterial Line BP: (71-98)/(56-73) 94/73     Weight: 87.1 kg (192 lb) (12/08/23 0500)  Body mass index is 26.04 kg/m².  Body surface area is 2.1 meters squared.    I/O last 3 completed shifts:  In: 4349.4 [I.V.:1858.5; NG/GT:565; IV Piggyback:1925.9]  Out: 5198 [Other:5118; Chest Tube:80]    Physical Exam  Vitals and nursing note reviewed.   Constitutional:       General: He is not in acute distress.     Appearance: He is ill-appearing. He is not diaphoretic.      Interventions: He is sedated and intubated.   HENT:      Head: Normocephalic and atraumatic.      Right Ear: External ear normal.      Left Ear: External ear normal.      Nose:      Comments: NG tube to left nostril.     Mouth/Throat:      Comments: ET tube in place.  Eyes:      General: No scleral icterus.        Right eye: No discharge.         Left eye: No discharge.      Conjunctiva/sclera: Conjunctivae normal.   Neck:      Comments: Trialysis catheter to left internal jugular vein. [place] Siva catheter in place.  Cardiovascular:      Rate and Rhythm: Normal rate.      Heart sounds: Murmur heard.      Systolic murmur is present.      No friction rub. No gallop.   Pulmonary:      Effort: Tachypnea present. He is intubated.      Breath sounds: Rales present. No wheezing.      Comments: Bibasilar crackles appreciated.   Chest:      Comments: Two chest tubes in place with LVAD present. Chest remains open at this time.  Abdominal:      General: Bowel sounds are normal. There is no distension.      Palpations: Abdomen is soft.    Genitourinary:     Comments: [place] catheter in place.  Musculoskeletal:      Cervical back: Neck supple.      Right lower leg: No edema.      Left lower leg: No edema.   Skin:     General: Skin is warm and dry.      Coloration: Skin is not jaundiced.   Neurological:      Mental Status: He is confused.      Comments: Unable to fully assess.   Psychiatric:      Comments: Unable to fully assess at this time.          Significant Labs:  ABGs:   Recent Labs   Lab 12/08/23  0322   PH 7.464*   PCO2 29.5*   HCO3 21.2*   POCSATURATED 99   BE -3*       BMP:   Recent Labs   Lab 12/08/23 0408   *      K 4.3      CO2 21*   BUN 13   CREATININE 1.1   CALCIUM 8.1*   MG 1.9       CBC:   Recent Labs   Lab 12/08/23 0408   WBC 10.42   RBC 2.16*   HGB 6.3*   HCT 19.0*      MCV 88   MCH 29.2   MCHC 33.2       CMP:   Recent Labs   Lab 12/08/23 0408   *   CALCIUM 8.1*   ALBUMIN 2.0*  2.0*   PROT 5.7*      K 4.3   CO2 21*      BUN 13   CREATININE 1.1   ALKPHOS 139*   ALT 26   AST 55*   BILITOT 0.8       Coagulation:   Recent Labs   Lab 12/08/23 0408   INR 1.1   APTT 34.3*       LFTs:   Recent Labs   Lab 12/08/23 0408   ALT 26   AST 55*   ALKPHOS 139*   BILITOT 0.8   PROT 5.7*   ALBUMIN 2.0*  2.0*       Microbiology Results (last 7 days)       Procedure Component Value Units Date/Time    AFB Culture & Smear [[id number]] Collected: 12/06/23 1532    Order Status: Completed Specimen: Body Fluid from Lung, Right Updated: 12/07/23 2127     AFB Culture & Smear Culture in progress     AFB CULTURE STAIN No acid fast bacilli seen.    Narrative:      Bronchial Wash    Culture, Respiratory [[id number]] Collected: 12/06/23 1531    Order Status: Completed Specimen: Respiratory from Bronchial Wash Updated: 12/07/23 0845     Respiratory Culture No Growth     Gram Stain (Respiratory) Moderate WBC's     Gram Stain (Respiratory) Rare Gram negative rods    Narrative:      Bronchial Wash    Direct AFB stain  [[id number]] Collected: 12/06/23 1532    Order Status: Completed Specimen: Body Fluid from Lung, Right Updated: 12/06/23 2257     Direct Acid Fast No acid fast bacilli seen.    Narrative:      Bronchial Wash    Fungus culture [[id number]] Collected: 12/06/23 1532    Order Status: Sent Specimen: Body Fluid from Lung, Right Updated: 12/06/23 1719    Gram stain [[id number]] Collected: 12/06/23 1532    Order Status: Canceled Specimen: Body Fluid from Lung, Right     Blood culture [[id number]] Collected: 11/28/23 1452    Order Status: Completed Specimen: Blood from Peripheral, Upper Arm, Right Updated: 12/03/23 1612     Blood Culture, Routine No growth after 5 days.    Blood culture [[id number]] Collected: 11/28/23 1450    Order Status: Completed Specimen: Blood from Peripheral, Forearm, Right Updated: 12/03/23 1612     Blood Culture, Routine No growth after 5 days.          Specimen (24h ago, onward)      None          Significant Imaging:  I have reviewed all imagining in the last 24 hours.  Assessment/Plan:     Cardiac/Vascular  * Acute on chronic combined systolic and diastolic CHF, NYHA class 4  LVAD (left ventricular assist device) present  Pre-transplant evaluation for heart transplant  Patient is identified as having Combined Systolic and Diastolic heart failure that is Acute on chronic. CHF is currently uncontrolled due to JVD, Rales/crackles on pulmonary exam, and Pulmonary edema/pleural effusion on CXR. Latest ECHO performed and demonstrates- Results for orders placed during the hospital encounter of 11/09/23    Echo    Interpretation Summary    Left Ventricle: The left ventricle is moderately dilated. Mildly increased ventricular mass. Normal wall thickness. There is eccentric hypertrophy. Severe global hyperkinesis present. There is severely reduced systolic function with a visually estimated ejection fraction of 10 -15%. Grade III diastolic dysfunction.    Right Ventricle: Normal right ventricular cavity  size. Systolic function is normal. Pacemaker lead present in the ventricle.    Left Atrium: Left atrium is severely dilated.    Mitral Valve: There is annular and bileaflet tethering. There is moderate regurgitation with a centrally directed jet.    Tricuspid Valve: There is mild regurgitation with a centrally directed jet.    Pulmonary Artery: The estimated pulmonary artery systolic pressure is 41 mmHg.    IVC/SVC: Normal venous pressure at 3 mmHg.    last BNP reviewed- and noted below   Recent Labs   Lab 12/08/23  0408   *       - management per primary team  - LVAD for mechanical support in addition to dobutamine and epinephrine for ionotropic support  - UF with SLED, goal to be net negative ~1 liter in the next 24 hours    Renal/  Acute renal failure with acute tubular necrosis superimposed on stage 3b chronic kidney disease  - suspect acute tubular injury secondary to hypotension/cardiogenic shock likely compounded by intra-arterial contrast and anemia with urinary sediment with granular casts  - will continue with continuous SLED for metabolic clearance and volume management, on citrate to prevent clotting   - Q8H RFPs, ionized calcium and magnesium levels per SLED protocol   - renal diet/tube feeds when not NPO, volume restriction per primary team  - strict I/O's and daily weights  - keep MAP > 65 mmHg  - renally all dose medications to eGFR  - avoid nephrotoxic agents wean feasible (i.e. NSAIDs, intra-arterial contrast, supra-therapeutic vancomycin levels, etc.)    Thank you for your consult. I will follow-up with patient. Please contact us if you have any additional questions.    Henok Ayon MD  Nephrology  [facility]

## 2023-12-08 NOTE — HPI
Neurology consulted for encephalopathy evaluation in Radha Abbott, a 63 y.o. male s/p recent LVAD placement for combined systolic/diastolic heart failure complicated by diffuse coagulopathy and RV dysfunction.

## 2023-12-08 NOTE — ASSESSMENT & PLAN NOTE
63 year old gentleman s/p LVAD placement with persistent post operative encephalopathy. Likely multifactorial. Recommend weaning sedatives as tolerated and ordering EEG to assess cortical activity.

## 2023-12-08 NOTE — PROGRESS NOTES
Update:  SW met with pt's wife at bedside.  Pt currently intubated.  Pt's wife reported that she is doing well at this time and has been informed by team of pt's latest update.  Pt's spouse reported that she wanted to wash clothes, and SW advised she go to [facility]  for assistance with laundry room access.  Pt's wife voiced understanding and agreement.  SW provided wife with meal cards.  No other needs reported at this time.  SW will follow.

## 2023-12-08 NOTE — PROGRESS NOTES
12/07/23 2029   Treatment   Treatment Type SLED   Treatment Status Restart   Dialysis Machine Number [id number]   Dialyzer Time (hours) 0   BVP (Liters) 0 L   Solutions Labeled and Current  Yes   Access Temporary Cath;Left;IJ   Catheter Dressing Intact  Yes   Alarms Engaged Yes   CRRT Comments CRRT restarted

## 2023-12-08 NOTE — PT/OT/SLP PROGRESS
Occupational Therapy      Patient Name:  Radha Abbott   MRN:  [id number]    Pt remains intubated s/p LVAD placement 12/1/23. OT to attempt at later date when medically appropriate.     12/8/2023

## 2023-12-08 NOTE — H&P
[facility]  Critical Care - Surgery  History & Physical    Patient Name: Radha Abbott  MRN: [id number]  Admission Date: 11/9/2023  Code Status: Full Code  Attending Physician: Yuri Washington MD   Primary Care Provider: Vasu Kong MD   Principal Problem: Acute on chronic combined systolic and diastolic CHF, NYHA class 4    Subjective:     HPI:  Radha Abbott is a 63 y.o. man with PMHx significant for CAD s/p 3V CABG in 2009 now with EF 10-15%, grade 3 diastolic dysfunction with ICD in place, normal RV function, moderate MR, PASP 41 on home , admitted for exacerbation of heart failure. Other co morbidities include uncontrolled DM2, HTN, HLD, hx of vfib taking home amio, CKD (baseline Cr ~2.5).     The patient presents to the SICU s/p LVAD re-do sternotomy with Dr. Washington on 12/1/2023. On admission, he is intubated, sedated with propofol, with an open chest, and in stable condition. Inotropic and vasopressor requirements upon admission are 0.1 mcg/kg/min of epinephrine and 10 of giapreza (levo and vaso off) to maintain blood pressure at a MAP 70-80. Central access includes a RIJ slick with EARL meeks trialysis, arterial access includes a left radial arterial line. LVAD RMP 4800 and flows at 2.5-2.8. 2 mediastinal chest tubes with moderate bloody output.     Intraoperatively, they received 1.5L of crystalloid, 500c albumin, 7u PRBCs, 2u FFP, 1u platelets, and 10u cryoprecipitate. Urine output intraoperatively was 700cc. Hung 500cc albumin at admission.          Hospital/ICU Course:  No notes on file    Follow-up For: Procedure(s) (LRB):  INSERTION-LEFT VENTRICULAR ASSIST DEVICE (Left)  STERNOTOMY, REDO (N/A)  VALVULOPLASTY, MITRAL VALVE (N/A)  LYSIS, ADHESIONS  REPAIR, AORTIC VALVE (N/A)  CLOSURE, TEMPORARY (N/A)  ANGIOPLASTY-VENOUS ARTERY, RIGHT FEMORAL (Right)  REPAIR, ANEURYSM, ARTERY, FEMORAL (Right)    Post-Operative Day: Day of Surgery     Past Medical History:   Diagnosis Date     CAD (coronary artery disease)     Diabetes mellitus     HFrEF (heart failure with reduced ejection fraction)     ICD (implantable cardioverter-defibrillator) in place     MI, old        Past Surgical History:   Procedure Laterality Date    CARDIAC SURGERY      INSERTION OF INTRA-AORTIC BALLOON ASSIST DEVICE Right 11/21/2023    Procedure: INSERTION, INTRA-AORTIC BALLOON PUMP;  Surgeon: Finn Cohn MD;  Location: [facility] CATH LAB;  Service: Cardiology;  Laterality: Right;    PLACEMENT OF SWAN ROLANDO CATHETER WITH IMAGING GUIDANCE  11/20/2023    Procedure: INSERTION, CATHETER, SWAN-ROLANDO, WITH IMAGING GUIDANCE;  Surgeon: Sajan Hurley MD;  Location: [facility] CATH LAB;  Service: Cardiology;;    RIGHT HEART CATHETERIZATION Right 10/10/2023    Procedure: INSERTION, CATHETER, RIGHT HEART;  Surgeon: Bin Gandhi MD;  Location: [facility] CATH LAB;  Service: Cardiology;  Laterality: Right;    RIGHT HEART CATHETERIZATION Right 10/13/2023    Procedure: INSERTION, CATHETER, RIGHT HEART;  Surgeon: Walter Mcintyre MD;  Location: [facility] CATH LAB;  Service: Cardiology;  Laterality: Right;    RIGHT HEART CATHETERIZATION  11/13/2023    RIGHT HEART CATHETERIZATION Right 11/13/2023    Procedure: INSERTION, CATHETER, RIGHT HEART;  Surgeon: Juventino Bermudez Jr., MD;  Location: [facility] CATH LAB;  Service: Cardiology;  Laterality: Right;    RIGHT HEART CATHETERIZATION Right 11/20/2023    Procedure: INSERTION, CATHETER, RIGHT HEART;  Surgeon: Sajan Hurley MD;  Location: [facility] CATH LAB;  Service: Cardiology;  Laterality: Right;       Review of patient's allergies indicates:  No Known Allergies    Family History    None       Tobacco Use    Smoking status: Every Day     Current packs/day: 0.50     Types: Cigarettes    Smokeless tobacco: Not on file   Substance and Sexual Activity    Alcohol use: Yes     Comment: rarely    Drug use: No    Sexual activity: Not on file      Review of Systems   Unable to perform ROS: Intubated      Objective:     Vital Signs (Most Recent):  Temp: 99.6 °F (37.6 °C) (12/01/23 1922)  Pulse: 103 (12/01/23 1922)  Resp: 18 (12/01/23 1922)  BP: (!) 109/56 (12/01/23 0601)  SpO2: 100 % (12/01/23 1922) Vital Signs (24h Range):  Temp:  [97.9 °F (36.6 °C)-99.7 °F (37.6 °C)] 99.6 °F (37.6 °C)  Pulse:  [] 103  Resp:  [14-24] 18  SpO2:  [88 %-100 %] 100 %  BP: (109-146)/(56-87) 109/56  Arterial Line BP: (73-95)/(52-69) 73/57     Weight: 77.5 kg (170 lb 13.7 oz)  Body mass index is 23.17 kg/m².      Intake/Output Summary (Last 24 hours) at 12/1/2023 1938  Last data filed at 12/1/2023 1907  Gross per 24 hour   Intake 3834.11 ml   Output 1455 ml   Net 2379.11 ml          Physical Exam  Vitals and nursing note reviewed.   Cardiovascular:      Rate and Rhythm: Normal rate and regular rhythm.      Comments: Open chest,  bloody  RPM 4800, flow 2.8  Pulmonary:      Comments: intubated  Abdominal:      General: There is no distension.      Palpations: Abdomen is soft.   Genitourinary:     Comments: goodman  Skin:     General: Skin is warm and dry.   Neurological:      Comments: sedated            Vents:  Vent Mode: A/C (12/01/23 1744)  Ventilator Initiated: Yes (12/01/23 1529)  Set Rate: 18 BPM (12/01/23 1744)  Vt Set: 520 mL (12/01/23 1744)  PEEP/CPAP: 5 cmH20 (12/01/23 1744)  Oxygen Concentration (%): 40 (12/01/23 1907)  Peak Airway Pressure: 22 cmH20 (12/01/23 1744)  Plateau Pressure: 0 cmH20 (12/01/23 1744)  Total Ve: 9.55 L/m (12/01/23 1744)  Negative Inspiratory Force (cm H2O): 0 (12/01/23 1744)  F/VT Ratio<105 (RSBI): (!) 33.9 (12/01/23 1744)    Lines/Drains/Airways       Central Venous Catheter Line  Duration              Introducer with Double Lumen 12/01/23 1554 Internal Jugular Right <1 day    Pulmonary Artery Catheter Assessment  12/01/23 1556 Internal Jugular Right <1 day    Trialysis (Dialysis) Catheter 12/01/23 1530 left internal jugular <1 day              Drain  Duration                  Chest Tube Tube -  1 Right Mediastinal -- days         Chest Tube 12/01/23 Tube - 2 Left Mediastinal <1 day         NG/OG Tube 12/01/23 1545 orogastric Center mouth <1 day         Urethral Catheter 12/01/23 0754 Temperature probe;Silicone;Non-latex 14 Fr. <1 day              Airway  Duration                  Airway - Non-Surgical 12/01/23 1534 Endotracheal Tube <1 day              Arterial Line  Duration             Arterial Line 12/01/23 1500 Left Radial <1 day              Line  Duration                  VAD 12/01/23 1015 Left ventricular assist device HeartMate 3 <1 day              Peripheral Intravenous Line  Duration                  Peripheral IV - Single Lumen 11/29/23 1100 20 G Anterior;Left Forearm 2 days                    Significant Labs:    CBC/Anemia Profile:  Recent Labs   Lab 12/01/23  1202 12/01/23  1224 12/01/23  1531 12/01/23  1533 12/01/23  1644 12/01/23  1744 12/01/23  1851   WBC 11.82  --  14.69*  --   --   --  12.09   HGB 6.8*  --  9.6*  --   --   --  7.1*   HCT 21.4*   < > 27.7*   < > 22* 21* 20.4*     --  137*  --   --   --  163   MCV 90  --  85  --   --   --  81*   RDW 13.9  --  15.3*  --   --   --  15.5*    < > = values in this interval not displayed.        Chemistries:  Recent Labs   Lab 12/01/23  0325 12/01/23  1531 12/01/23  1851   * 138 141   K 4.5 5.2* 4.6    111* 110   CO2 25 17* 20*   BUN 52* 41* 44*   CREATININE 2.1* 1.7* 1.9*   CALCIUM 8.8 7.9* 8.0*   ALBUMIN 2.8* 1.9* 2.7*   PROT 6.7 4.4* 4.8*   BILITOT 0.4 1.7* 3.1*   ALKPHOS 74 48* 44*   ALT 26 23 23   AST 31 69* 67*   MG 2.8* 2.8* 2.7*   PHOS  --  4.7* 2.6*       All pertinent labs within the past 24 hours have been reviewed.    Significant Imaging: I have reviewed all pertinent imaging results/findings within the past 24 hours.  Assessment/Plan:     Cardiac/Vascular  * Acute on chronic combined systolic and diastolic CHF, NYHA class 4    Neuro/Psych:   -- Sedation: propofol 50mcg/kg/min  -- Pain:    -- fentanyl PRN,  dilaudid 0.5 q1 PRN while intubated, Oxy PRN             Cardiac:   -- S/P redo sternotomy LVAD with Dr. Washington on 12/01, chest remaining open  -- Plans for chest closure pending clinical progression on Monday   -- MAP Goal: 70-80, closer to 70  -- Cleviprex/Cardene PRN  -- Pressors: epi 0.1, shaji 10   -- levo and vaso off  -- Anti-HTNs: Will start when appropriate  -- Rhythm: NSR  -- amiodarone 400mg daily  -- LVAD heartmate III: RPM 4800, flow 2.5-2.8  -- Beta blocker: Will start when appropriate  -- Statin: Atorvastatin 40 mg QD**  -- No ASA at this time      Pulmonary:   -- Goal SpO2 >92%  -- Intubated  -- minimal vent settings  -- Chest Tubes x 2 (2 Meds): high volume bloody output, monitor closely and replace losses  -- q1 ABGs and lactates      Renal:  -- Trend BUN/Cr   -- Maintain Hampton, record strict Is/Os  -- adequate UOP during case, monitor closely     Recent Labs   Lab 11/30/23  0413 11/30/23  1530 12/01/23  0325   BUN 60* 58* 52*   CREATININE 2.1* 2.3* 2.1*         FEN / GI:   -- Daily CMP, PRN K/Mag/Phos per protocol   -- Replace electrolytes as needed  -- Nutrition: NPO  -- OG tube ok for meds  -- Bowel Regimen: Miralax, docusate      ID:   -- Afebrile  -- WBC stable  -- Abx: cefepime. NO vanc  -- vanc levels tomorrow    Recent Labs   Lab 11/29/23  0400 11/30/23  0413 12/01/23  0325   WBC 6.82 6.26 6.84         Heme/Onc:   -- Hgb 8.7 pre-operatively  -- received 7RBC, 2 FFP, 1 platelet, 10 cry  -- q4 CBC and coags  -- q4 fibrinogen - keep fibrinogen >300  -- holding ASA    Recent Labs   Lab 11/29/23  0400 11/29/23  1626 11/29/23  2242 11/30/23  0413 12/01/23  0325   HGB 8.7*  --   --  8.4* 7.9*     --   --  281 280   APTT 53.7*   < > 54.3* 48.8*  48.8* 50.3*   INR  --   --   --  1.1  --     < > = values in this interval not displayed.         Endocrine:   -- CTS Goal -140  -- HgbA1c: 7.6  -- Endocrinology consulted for insulin management      PPx:   Feeding: NPO  Analgesia/Sedation:  fent, dilaudid / Prop  Thromboembolic Prevention: SCDs  HOB >30: Yes  Stress Ulcer: pantoprazole   Glucose Control: Yes, insulin management per Endocrinology  Bowel reg:   Invasive Lines/Drains/Airway:   ETT  OGT  Left radial arterial line  RIJ mac w/ slick + jeanna CANASJ quad CVC  [place]pton  Chest Tubes: 2 Mediastinal  LVAD     Dispo/Code Status/Palliative:     - Continue SICU Care    - Full Code  .         Elizabeth Key MD  Critical Care - Surgery  [facility]   show

## 2023-12-08 NOTE — ASSESSMENT & PLAN NOTE
-multifactorial  -likely metabolic encephalopathy +/- icu delirium   -CT 12/7 negative for acute process  -Continue RRT for metabolic clearance  -Wean sedation as able  -EEG in progress   -provide frequent re-orientation  -Promote sleep/wake cycle   -Neurology consulted

## 2023-12-08 NOTE — ASSESSMENT & PLAN NOTE
-HeartMate 3 Implanted  12/1/2023  as DT  -CTS Primary  -Implanted by Dr. Washington  -HOLD Coumadin,  Goal INR 2.0-3.0 . Subtherapeutic today. On Heparin gtt   -Antiplatelets Not on ASA  -LDH is stable overall today. Will continue to monitor daily.  -Speed set at  4900   rpm, LSL 4500 rpm   -Interrogation notable for no events  -Not listed for OHTx, declined for OHTX due to comorbidities         Procedure: Device Interrogation Including analysis of device parameters  Current Settings: Ventricular Assist Device  Review of device function is stable/unstable stable        12/8/2023    11:00 AM 12/8/2023    10:00 AM 12/8/2023     9:00 AM 12/8/2023     8:00 AM 12/8/2023     7:00 AM 12/8/2023     6:00 AM 12/8/2023     5:00 AM   TXP LVAD INTERROGATIONS   Type HeartMate3 HeartMate3 HeartMate3 HeartMate3 HeartMate3     Flow 3.9 3.9 3.8 4 3.9 3.5 3.8   Speed 4950 4900 4900 4900 4900 4900 4900   PI 4 3.8 3.7 3.4 4.2 5.5 4   Power (Carrington) 3.3 3.2 3.2 3.3 3.3 3.2 3.3   LSL 4500 4500 4500 4500 4500 4500 4500   Pulsatility Intermittent pulse Intermittent pulse Intermittent pulse Intermittent pulse Intermittent pulse Intermittent pulse Intermittent pulse

## 2023-12-08 NOTE — ASSESSMENT & PLAN NOTE
62 yo man s/p destination therapy left ventricular assist device placement. Postoperatively the chest was left open due to diffuse coagulopathy and RV dysfunction.  He did well post-operatively and underwent washout and closure on 12/4. The patient was extubated but had to be reintubated. A BAL was performed: Gram stain with GNB, culture is NGTD. PCT elevated at 10.    Recommendations  Continue empiric Zosyn  Follow-up PCT   Await final respiratory culture results  Will de-escalate quickly as allowed by patient's clinical response to therapy  Discussed with family

## 2023-12-08 NOTE — PROGRESS NOTES
[facility] - [facility]  Heart Transplant  Progress Note    Patient Name: Radha Abbott  MRN: [id number]  Admission Date: 11/9/2023  Hospital Length of Stay: 29 days  Attending Physician: Yuri Washington MD  Primary Care Provider: Vasu Kong MD  Principal Problem:Acute on chronic combined systolic and diastolic CHF, NYHA class 4    Subjective:   Interval History: Patient POD #8 s/p HM3 LVAD implant. No acute events overnight. Nodding head and tracking with eyes on Precedex today. EEG being placed. Tolerated SLED overnight. Ketty weaned off and Epi is down to 0.01. On and off Cardene. 2D echo is pending after speed increase yesterday.       Continuous Infusions:   sodium chloride 0.9% Stopped (12/05/23 1634)    calcium gluconate 3 g in dextrose 5 % (D5W) 100 mL infusion      dexmedeTOMIDine (Precedex) infusion (titrating) 1.4 mcg/kg/hr (12/08/23 1128)    dextrose 10 % in water (D10W)      dextrose-sod citrate-citric ac      DOBUTamine 5 mcg/kg/min (12/08/23 1000)    EPINEPHrine 0.01 mcg/kg/min (12/08/23 1008)    heparin (porcine) in 5 % dex 1,000 Units/hr (12/08/23 1000)    nicardipine Stopped (12/08/23 0947)     Scheduled Meds:   amiodarone  400 mg Per NG tube Daily    atorvastatin  40 mg Per NG tube Daily    ferrous gluconate  324 mg Per NG tube Daily with breakfast    insulin aspart U-100  3 Units Subcutaneous 6 times per day    pantoprazole  40 mg Intravenous BID    piperacillin-tazobactam (Zosyn) IV (PEDS and ADULTS) (extended infusion is not appropriate)  4.5 g Intravenous Q8H    polyethylene glycol  17 g Per NG tube BID    senna-docusate 8.6-50 mg  2 tablet Per NG tube BID    warfarin  2 mg Per NG tube Daily     PRN Meds:0.9%  NaCl infusion (for blood administration), acetaminophen, albumin human 5%, albuterol sulfate, bisacodyL, dextrose 10 % in water (D10W), dextrose 10%, dextrose 10%, glucagon (human recombinant), insulin aspart U-100, magnesium hydroxide 400 mg/5 ml, magnesium sulfate IVPB,  potassium chloride, potassium chloride, QUEtiapine, Flushing PICC/Midline Protocol **AND** [DISCONTINUED] sodium chloride 0.9% **AND** sodium chloride 0.9%, sodium phosphate 20.01 mmol in dextrose 5 % (D5W) 250 mL IVPB, sodium phosphate 30 mmol in dextrose 5 % (D5W) 250 mL IVPB, sodium phosphate 39.99 mmol in dextrose 5 % (D5W) 250 mL IVPB    Review of patient's allergies indicates:  No Known Allergies  Objective:     Vital Signs (Most Recent):  Temp: 98.5 °F (36.9 °C) (12/08/23 1115)  Pulse: 84 (12/08/23 1130)  Resp: (!) 24 (12/08/23 1130)  BP: (!) 76/0 (12/08/23 0300)  SpO2: 100 % (12/08/23 1130) Vital Signs (24h Range):  Temp:  [98.1 °F (36.7 °C)-99.6 °F (37.6 °C)] 98.5 °F (36.9 °C)  Pulse:  [] 84  Resp:  [20-24] 24  SpO2:  [92 %-100 %] 100 %  BP: (72-86)/(0) 76/0  Arterial Line BP: (71-98)/(56-73) 92/69     Patient Vitals for the past 72 hrs (Last 3 readings):   Weight   12/08/23 0500 87.1 kg (192 lb)   12/07/23 0501 95 kg (209 lb 7 oz)   12/07/23 0500 95 kg (209 lb 7 oz)       Body mass index is 26.04 kg/m².      Intake/Output Summary (Last 24 hours) at 12/8/2023 1214  Last data filed at 12/8/2023 1100  Gross per 24 hour   Intake 4349.67 ml   Output 5352 ml   Net -1002.33 ml         Hemodynamic Parameters:       Telemetry: NSR        Physical Exam  Constitutional:       Appearance: He is ill-appearing.      Comments: Intubated and sedated    HENT:      Head: Normocephalic and atraumatic.   Eyes:      Pupils: Pupils are equal, round, and reactive to light.   Cardiovascular:      Rate and Rhythm: Normal rate and regular rhythm.      Comments: LVAD hum  Pulmonary:      Breath sounds: Rhonchi and rales present.   Chest:      Comments: CT x1  Abdominal:      General: Abdomen is flat. Bowel sounds are normal. There is no distension.      Palpations: Abdomen is soft.   Musculoskeletal:         General: Normal range of motion.      Cervical back: Neck supple.   Skin:     General: Skin is warm and dry.    Neurological:      Comments: Not following commands. Jessica            Significant Labs:  CBC:  Recent Labs   Lab 12/07/23  0417 12/07/23  1206 12/07/23  1937 12/08/23  0408   WBC 12.25 10.31  --  10.42   RBC 2.35* 2.28*  --  2.16*   HGB 6.8* 7.0*  --  6.3*   HCT 20.0* 20.6* 18* 19.0*    249  --  246   MCV 85 90  --  88   MCH 28.9 30.7  --  29.2   MCHC 34.0 34.0  --  33.2       BNP:  Recent Labs   Lab 12/04/23  0412 12/06/23  0410 12/08/23  0408   * 1,679* 695*       CMP:  Recent Labs   Lab 12/06/23  0410 12/06/23  1128 12/07/23  0417 12/07/23  0758 12/07/23  1413 12/07/23  1655 12/07/23  2225 12/08/23  0408 12/08/23  1110   GLU 98  96   < > 128*  --  144*  --  182* 209*  --    CALCIUM 8.3*  8.3*   < > 8.4*  --  8.2*  --  7.9* 8.1*  --    ALBUMIN 2.2*  2.2*  2.2*   < > 2.2*  --  2.0*  --  1.9* 2.0*  2.0*  --    PROT 5.8*  5.9*  --  6.1  --   --   --   --  5.7*  --      140   < > 139  --  140  --  139 140  --    K 4.2  4.2   < > 5.1   < > 4.3   < > 4.4 4.3 4.0   CO2 19*  22*   < > 15*  --  16*  --  22* 21*  --      106   < > 105  --  105  --  105 106  --    BUN 33*  27*   < > 22  --  24*  --  19 13  --    CREATININE 2.0*  1.6*   < > 1.5*  --  1.6*  --  1.4 1.1  --    ALKPHOS 115  121  --  129  --   --   --   --  139*  --    ALT 17  17  --  33  --   --   --   --  26  --    AST 49*  47*  --  79*  --   --   --   --  55*  --    BILITOT 0.9  0.9  --  0.7  --   --   --   --  0.8  --     < > = values in this interval not displayed.        Coagulation:   Recent Labs   Lab 12/08/23  0015 12/08/23  0408 12/08/23  0716 12/08/23  1110   INR 1.1 1.1  --  1.1   APTT 34.5* 34.3* 33.4* 28.7       LDH:  Recent Labs   Lab 12/06/23  0410 12/07/23  0417 12/08/23  0408   * 591* 526*       Microbiology:  Microbiology Results (last 7 days)       Procedure Component Value Units Date/Time    Culture, Respiratory [[id number]] Collected: 12/06/23 1531    Order Status: Completed Specimen:  Respiratory from Bronchial Wash Updated: 12/08/23 1103     Respiratory Culture No growth     Gram Stain (Respiratory) Moderate WBC's     Gram Stain (Respiratory) Rare Gram negative rods    Narrative:      Bronchial Wash    AFB Culture & Smear [[id number]] Collected: 12/06/23 1532    Order Status: Completed Specimen: Body Fluid from Lung, Right Updated: 12/07/23 2127     AFB Culture & Smear Culture in progress     AFB CULTURE STAIN No acid fast bacilli seen.    Narrative:      Bronchial Wash    Direct AFB stain [[id number]] Collected: 12/06/23 1532    Order Status: Completed Specimen: Body Fluid from Lung, Right Updated: 12/06/23 2257     Direct Acid Fast No acid fast bacilli seen.    Narrative:      Bronchial Wash    Fungus culture [[id number]] Collected: 12/06/23 1532    Order Status: Sent Specimen: Body Fluid from Lung, Right Updated: 12/06/23 1719    Gram stain [[id number]] Collected: 12/06/23 1532    Order Status: Canceled Specimen: Body Fluid from Lung, Right     Blood culture [[id number]] Collected: 11/28/23 1452    Order Status: Completed Specimen: Blood from Peripheral, Upper Arm, Right Updated: 12/03/23 1612     Blood Culture, Routine No growth after 5 days.    Blood culture [[id number]] Collected: 11/28/23 1450    Order Status: Completed Specimen: Blood from Peripheral, Forearm, Right Updated: 12/03/23 1612     Blood Culture, Routine No growth after 5 days.            BMP:   Recent Labs   Lab 12/08/23  0408 12/08/23  1110   *  --      --    K 4.3 4.0     --    CO2 21*  --    BUN 13  --    CREATININE 1.1  --    CALCIUM 8.1*  --    MG 1.9 2.2       I have reviewed all pertinent labs within the past 24 hours.    Estimated Creatinine Clearance: 77.4 mL/min (based on SCr of 1.1 mg/dL).    Diagnostic Results:  I have reviewed all pertinent imaging results/findings within the past 24 hours.  Assessment and Plan:     63 year old male with hx of CAD s/p 3v CABG (unclear anatomy, 2009), ICM with a  recent EF of 15-20% s/p ICD (elsa 2009), DM2 (a1c 7.7), HTN, HLD, Vfib on amio who presents to the ED with CC of SOB     Pt was recently admitted to [facility] [place] as a transfer.  He was started on a Lasix gtt and did well.  Started on gDMT and discharged home on  5 with plans to follow up in [facility] clinic for ongoing transplant evaluation at another facility.  He says that about a few days ago he started to notice LE swelling.  This turned into Nelson and orthopnea.  He says he can't walk to the bathroom without getting SOB.  He also complains of weight gain.  He takes torsemide 20mg BID at home and was told to trial additional Lasix which he did without any improvement.  He was rx metolazone but has not been filled.  He came to the ED     In the ED he was AF with stable VS on RA.  CBC showing chronic anemia.  CMP notable for acute on chronic CKD with baseline around 2.1 and Cr now 2.6.  BNP elevated.  Lactate neg.  CXR showing pulmonary edema.  I evaluated the pt at the bedside.  Bedside TTE showing CVP >15.  He was subsequently admitted to the CCU for diuresis.     He was continued on his home  and was started on a lasix gtt, which he responded well to overnight (net -1700cc. He feels much better this morning as well. Our transplant coordinators have been working on insurance approval and he is now being transferred to [facility] service for transplant evaluation.     * Acute on chronic combined systolic and diastolic CHF, NYHA class 4  Pt with known ICM with an EF of 25% on home  presented with decompensated HF.  Not in cardiogenic shock    RHC 11/14: RA 14, PA 70/35, PCWP 32, CO 4.1, CI 2.1.   Repeat RHC 11/20 RA RA  20  PA 60/29 (39)  PCWP 29    CO  4.6 l/min  CI  2.3 l/min/m2  2D echo 11/21 showed EF 15-20%, mild RV dysfunction, mild MR, LVEDD 6.9     Repeat on 11/24 showed EF 10-15%, RV mildly reduced, mild-mod MR, LVEDD 6.42  - Home  gtt at 5  - home GDMT: entresto 24/26mg BID (on hold 2/2 IVÁN), Hydralazine 25 q8h  - held prior to LVAD implant   - home diuretics: Was taking lasix 40 bid  - Bridged to LVAD with IABP s/p HM3 implant 12/1        -PT/OT and nutrition   -s/p LVAD (12/1) Speed increased on 12/7 to 4900 RPMs  - Remains on  5, epi 0.01, Ketty off  - CTS primary  -f/u echo today           Altered mental status  -multifactorial  -likely metabolic encephalopathy +/- icu delirium   -CTH 12/7 negative for acute process  -Continue RRT for metabolic clearance  -Wean sedation as able  -EEG in progress   -provide frequent re-orientation  -Promote sleep/wake cycle   -Neurology consulted     LVAD (left ventricular assist device) present  -HeartMate 3 Implanted  12/1/2023  as DT  -CTS Primary  -Implanted by Dr. Washington  -HOLD Coumadin,  Goal INR 2.0-3.0 . Subtherapeutic today. On Heparin gtt   -Antiplatelets Not on ASA  -LDH is stable overall today. Will continue to monitor daily.  -Speed set at  4900   rpm, LSL 4500 rpm   -Interrogation notable for no events  -Not listed for OHTx, declined for OHTX due to comorbidities         Procedure: Device Interrogation Including analysis of device parameters  Current Settings: Ventricular Assist Device  Review of device function is stable/unstable stable        12/8/2023    11:00 AM 12/8/2023    10:00 AM 12/8/2023     9:00 AM 12/8/2023     8:00 AM 12/8/2023     7:00 AM 12/8/2023     6:00 AM 12/8/2023     5:00 AM   TXP LVAD INTERROGATIONS   Type HeartMate3 HeartMate3 HeartMate3 HeartMate3 HeartMate3     Flow 3.9 3.9 3.8 4 3.9 3.5 3.8   Speed 4950 4900 4900 4900 4900 4900 4900   PI 4 3.8 3.7 3.4 4.2 5.5 4   Power ([place]) 3.3 3.2 3.2 3.3 3.3 3.2 3.3   LSL 4500 4500 4500 4500 4500 4500 4500   Pulsatility Intermittent pulse Intermittent pulse Intermittent pulse Intermittent pulse Intermittent pulse Intermittent pulse Intermittent pulse         PAF (paroxysmal atrial fibrillation)  Known hx of pAF. In sinus rhythm on admission, but 1 run of AF RVR overnight. He spontaneously converted.  -  Continue home amiodarone 400mg qd  - Stop home Eliquis and continue heparin gtt while in the hospital.       Acute renal failure with acute tubular necrosis superimposed on stage 3b chronic kidney disease  IVÁN on CKD2. Baseline Cr of 1.7-2.0.   - Hold ARNi, entresto  -Trend BMPs daily   -SrCr 1.1/BUN 13 today  -SLED/SCUF for volume removal   -Nephrology following     Type 2 diabetes mellitus without complication, without long-term current use of insulin  -A1c 7.6, not insulin dependent  - MDSSI  -Currently on Insulin gtt   - Endocrine following, appreciate assistance with blood glucose management    CAD (coronary artery disease)  -S/p 3vCABG in 2009  - home ASA, HI statin- on hold after surgery     Ventricular tachycardia  Hx VT s/p ICD placement (medtronic 2009)  - Continue home amio 400mg qd    .Uninterrupted Critical Care/Counseling Time (not including procedures): 50 mins       Hugh Duran, ALETHA  Heart Transplant  [facility] - [facility]

## 2023-12-08 NOTE — CONSULTS
[facility]  Neurology  Consult Note    Patient Name: Radha Abbott  MRN: [id number]  Admission Date: 11/9/2023  Hospital Length of Stay: 28 days  Code Status: Full Code   Attending Provider: Yuri Washington MD   Consulting Provider: Philip Morales MD  Primary Care Physician: Vasu Kong MD  Principal Problem:Acute on chronic combined systolic and diastolic CHF, NYHA class 4    Inpatient consult to Neurology  Consult performed by: Philip Morales MD  Consult ordered by: Melanie Padilla MD         Subjective:     Chief Complaint:  Encephalopathy     HPI:   Neurology consulted for encephalopathy evaluation in Radha Abbott, a 63 y.o. male s/p recent LVAD placement for combined systolic/diastolic heart failure complicated by diffuse coagulopathy and RV dysfunction.      Past Medical History:   Diagnosis Date    CAD (coronary artery disease)     Diabetes mellitus     HFrEF (heart failure with reduced ejection fraction)     ICD (implantable cardioverter-defibrillator) in place     MI, old        Past Surgical History:   Procedure Laterality Date    ANGIOPLASTY-VENOUS ARTERY Right 12/1/2023    Procedure: ANGIOPLASTY-VENOUS ARTERY, RIGHT FEMORAL;  Surgeon: Yuri Washington MD;  [facility];  Service: Cardiovascular;  Laterality: Right;    AORTIC VALVULOPLASTY N/A 12/1/2023    Procedure: REPAIR, AORTIC VALVE;  Surgeon: Yuri Washington MD;  [facility];  Service: Cardiovascular;  Laterality: N/A;    CARDIAC SURGERY      CLOSURE N/A 12/1/2023    Procedure: CLOSURE, TEMPORARY;  Surgeon: Yuri Washington MD;  Location: [facility] OR [facility] FLR;  Service: Cardiovascular;  Laterality: N/A;    DRAINAGE OF PLEURAL EFFUSION  12/4/2023    Procedure: DRAINAGE, PLEURAL EFFUSION;  Surgeon: Yuri Washington MD;  Location: [facility] OR 2ND FLR;  Service: Cardiovascular;;    INSERTION OF GRAFT TO PERICARDIUM  12/4/2023    Procedure: INSERTION, GRAFT, PERICARDIUM;  Surgeon: Padmini  MD Yuri;  Location: [facility] OR [facility] FLR;  Service: Cardiovascular;;    INSERTION OF INTRA-AORTIC BALLOON ASSIST DEVICE Right 11/21/2023    Procedure: INSERTION, INTRA-AORTIC BALLOON PUMP;  Surgeon: Finn Cohn MD;  Location: [facility] CATH LAB;  Service: Cardiology;  Laterality: Right;    LEFT VENTRICULAR ASSIST DEVICE Left 12/1/2023    Procedure: INSERTION-LEFT VENTRICULAR ASSIST DEVICE;  Surgeon: Yuri Washington MD;  [facility];  Service: Cardiovascular;  Laterality: Left;  REDO STERNOTOMY - REDO SAW NEEDED FOR CASE    LYSIS OF ADHESIONS  12/1/2023    Procedure: LYSIS, ADHESIONS;  Surgeon: Yuri Washington MD;  [facility];  Service: Cardiovascular;;    PLACEMENT OF SWAN ROLANDO CATHETER WITH IMAGING GUIDANCE  11/20/2023    Procedure: INSERTION, CATHETER, SWAN-ROLANDO, WITH IMAGING GUIDANCE;  Surgeon: Sajan Hurley MD;  Location: [facility] CATH LAB;  Service: Cardiology;;    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 12/1/2023    Procedure: REPAIR, ANEURYSM, ARTERY, FEMORAL;  Surgeon: Yuri Washington MD;  [facility];  Service: Cardiovascular;  Laterality: Right;  Right Femoral Artery Repair    RIGHT HEART CATHETERIZATION Right 10/10/2023    Procedure: INSERTION, CATHETER, RIGHT HEART;  Surgeon: Bin Gandhi MD;  Location: [facility] CATH LAB;  Service: Cardiology;  Laterality: Right;    RIGHT HEART CATHETERIZATION Right 10/13/2023    Procedure: INSERTION, CATHETER, RIGHT HEART;  Surgeon: Walter Mcintyre MD;  Location: [facility] CATH LAB;  Service: Cardiology;  Laterality: Right;    RIGHT HEART CATHETERIZATION  11/13/2023    RIGHT HEART CATHETERIZATION Right 11/13/2023    Procedure: INSERTION, CATHETER, RIGHT HEART;  Surgeon: Juventino Bermudez Jr., MD;  Location: [facility] CATH LAB;  Service: Cardiology;  Laterality: Right;    RIGHT HEART CATHETERIZATION Right 11/20/2023    Procedure: INSERTION, CATHETER, RIGHT HEART;  Surgeon: Sajan Hurley MD;  Location:  [facility] CATH LAB;  Service: Cardiology;  Laterality: Right;    STERNAL WOUND CLOSURE N/A 12/4/2023    Procedure: CLOSURE, WOUND, STERNUM;  Surgeon: Yuri Washington MD;  [facility];  Service: Cardiovascular;  Laterality: N/A;    STERNOTOMY N/A 12/1/2023    Procedure: STERNOTOMY, REDO;  Surgeon: Yuri Washington MD;  [facility];  Service: Cardiovascular;  Laterality: N/A;    VALVULOPLASTY, MITRAL VALVE N/A 12/1/2023    Procedure: VALVULOPLASTY, MITRAL VALVE;  Surgeon: Yuri Washington MD;  [facility];  Service: Cardiovascular;  Laterality: N/A;       Review of patient's allergies indicates:  No Known Allergies    No current facility-administered medications on file prior to encounter.     Current Outpatient Medications on File Prior to Encounter   Medication Sig    amiodarone (PACERONE) 400 MG tablet Take 1 tablet (400 mg total) by mouth once daily.    apixaban (ELIQUIS) 5 mg Tab Take 1 tablet (5 mg total) by mouth 2 (two) times daily.    aspirin (ECOTRIN) 81 MG EC tablet Take 81 mg by mouth once daily.    atorvastatin (LIPITOR) 40 MG tablet Take 40 mg by mouth.    DOBUTamine (DOBUTREX) 1,000 mg/250 mL (4,000 mcg/mL) infusion Inject 389.5 mcg/min into the vein continuous.    fish oil-omega-3 fatty acids 300-1,000 mg capsule Take 2 g by mouth once daily.    furosemide (LASIX) 40 MG tablet Take 1 tablet (40 mg total) by mouth 2 (two) times daily.    gabapentin (NEURONTIN) 300 MG capsule Take 300 mg by mouth nightly as needed.    multivitamin capsule Take 1 capsule by mouth once daily.    pantoprazole (PROTONIX) 40 MG tablet Take 1 tablet (40 mg total) by mouth before breakfast.    potassium chloride SA (K-DUR,KLOR-CON) 20 MEQ tablet Take 1 tablet (20 mEq total) by mouth once daily.    sacubitriL-valsartan (ENTRESTO) 24-26 mg per tablet Take 1 tablet by mouth 2 (two) times daily.    metOLazone (ZAROXOLYN) 2.5 MG tablet Take 1 tablet (2.5 mg total) by mouth once  daily. Thursday 11/9 and Saturday 11/10    torsemide (DEMADEX) 20 MG Tab Take 2 tablets (40 mg total) by mouth 2 (two) times a day.     Family History    None       Tobacco Use    Smoking status: Every Day     Current packs/day: 0.50     Types: Cigarettes    Smokeless tobacco: Not on file   Substance and Sexual Activity    Alcohol use: Yes     Comment: rarely    Drug use: No    Sexual activity: Not on file     Review of Systems   Unable to perform ROS: Intubated     Objective:     Vital Signs (Most Recent):  Temp: 99.6 °F (37.6 °C) (12/07/23 1900)  Pulse: 89 (12/07/23 2100)  Resp: (!) 28 (12/06/23 0500)  BP: (!) 84/0 (12/07/23 1900)  SpO2: 100 % (12/07/23 2100) Vital Signs (24h Range):  Temp:  [98.8 °F (37.1 °C)-99.6 °F (37.6 °C)] 99.6 °F (37.6 °C)  Pulse:  [] 89  SpO2:  [92 %-100 %] 100 %  BP: (72-84)/(0) 84/0  Arterial Line BP: (71-92)/(56-72) 72/63     Weight: 95 kg (209 lb 7 oz)  Body mass index is 28.4 kg/m².     Physical Exam  Vitals and nursing note reviewed.   Constitutional:       Appearance: He is ill-appearing.      Interventions: He is sedated and intubated.   Pulmonary:      Effort: He is intubated.             -Unreliable neurologic exam due to sedation.   -Mental Status: Does not open eyes to voice/touch/pain. Follows no commands.  -Cranial Nerves: Does not blink to threat. Pupils equal, round, and reactive. Bilateral corneals intact. Intact bilateral oculocephalics. Cough intact.  Motor/Sensation: Withdraws to painful stimuli in all 4 extremities  Reflexes: Plantar responses down.  Unable to test orientation, language, memory, judgment, insight, fund of knowledge, hearing, shoulder shrug, tongue protrusion, coordination, gait due to level of consciousness.     Significant Labs:   Recent Lab Results  (Last 5 results in the past 24 hours)        12/07/23  1942 12/07/23  1941 12/07/23  1937 12/07/23  1814   12/07/23  1655        Allens Test N/A     N/A           aPTT          33.8  Comment: Refer to local heparin nomogram for intensity/dose specific   therapeutic   range.         Site Stef/UAC     Stef/UAC           Calcium Level Ionized       1.04         Fibrinogen         678       INR         1.1  Comment: Coumadin Therapy:  2.0 - 3.0 for INR for all indicators except mechanical heart valves  and antiphospholipid syndromes which should use 2.5 - 3.5.         Magnesium          2.4       POC BE     -9           POC HCO3     16.4           POC Hematocrit     18           POC Ionized Calcium     1.11           POC Lactate 0.33               POC PCO2     27.2           POC PH     7.390           POC PO2     167           Potassium, Blood Gas     4.4           POC SATURATED O2     100           Sodium, Blood Gas     136           POC TCO2     17           POCT Glucose   209             Potassium         4.4       Protime         11.7       Sample ARTERIAL     ARTERIAL                                All pertinent lab results from the past 24 hours have been reviewed.    Significant Imaging: I have reviewed all pertinent imaging results/findings within the past 24 hours.  Assessment and Plan:     Acute encephalopathy  63 year old gentleman s/p LVAD placement with persistent post operative encephalopathy. Likely multifactorial. Recommend weaning sedatives as tolerated and ordering EEG to assess cortical activity.         VTE Risk Mitigation (From admission, onward)           Ordered     warfarin (COUMADIN) tablet 2 mg  Daily         12/06/23 0926     heparin 25,000 units in dextrose 5% 250 mL (100 units/mL) infusion (heparin infusion - NO NOMOGRAM)  Continuous         12/06/23 0926     Reason for no Mechanical VTE Prophylaxis  Once        Question:  Reasons:  Answer:  Physician Provided (leave comment)    12/02/23 0032     IP VTE HIGH RISK PATIENT  Once         12/01/23 1552     Place sequential compression device  Until discontinued         12/01/23 1552                    Thank you for  your consult. I will follow-up with patient. Please contact us if you have any additional questions.    Philip Morales MD  Neurology  [facility]

## 2023-12-08 NOTE — PROGRESS NOTES
[facility]  Endocrinology  Progress Note    Admit Date: 11/9/2023     Reason for Consult: Management of T2DM, Hyperglycemia     Surgical Procedure and Date: n/a    Diabetes diagnosis year: 1998    Home Diabetes Medications:  Metformin (off since October)   Lab Results   Component Value Date    HGBA1C 7.6 (H) 10/12/2023       How often checking glucose at home?  Once daily in the AM    BG readings on regimen: 150-160s  Hypoglycemia on the regimen?  No  Missed doses on regimen?  n/a    Diabetes Complications include:     Hyperglycemia    Complicating diabetes co morbidities:   CAD s/p CABG, HTN, HLD      HPI:   Patient is a 63 y.o. male with a diagnosis of CAD s/p 3v CABG (unclear anatomy, 2009), ICM with a recent EF of 15-20% s/p ICD (medtronik 2009), DM2 (a1c 7.7), HTN, HLD, Vfib on amio who presents to the ED with CC of SOB. In the ED he was AF with stable VS on RA.  CBC showing chronic anemia.  CMP notable for acute on chronic CKD with baseline around 2.1 and Cr now 2.6.  BNP elevated.  Lactate neg.  CXR showing pulmonary edema. He was subsequently admitted to the CCU for diuresis.  Endocrinology consulted for management of T2DM.          Interval HPI:   No acute events overnight. TF increased to 40 mL/hr. Patient in room 27329/17503 A. Blood glucose stable. BG above goal on current insulin regimen (SSI ). Steroid use- None .   4 Days Post-Op  Renal function- Normal   Vasopressors-  None     Diet NPO Except for: Medication (per NG tube)     Eating:   NPO  Hypoglycemia and intervention: No  Fever: No  TPN and/or TF: Yes  If yes, type of TF/TPN and rate: TF increased overnight to 40 mL/hr     BP (!) 76/0 (BP Location: Left arm, Patient Position: Lying)   Pulse 86   Temp 98.4 °F (36.9 °C) (Oral)   Resp 20   Ht 6' (1.829 m)   Wt 87.1 kg (192 lb)   SpO2 100%   BMI 26.04 kg/m²     Labs Reviewed and Include    Recent Labs   Lab 12/08/23  0408   *   CALCIUM 8.1*   ALBUMIN 2.0*  2.0*  "  PROT 5.7*      K 4.3   CO2 21*      BUN 13   CREATININE 1.1   ALKPHOS 139*   ALT 26   AST 55*   BILITOT 0.8     Lab Results   Component Value Date    WBC 10.42 12/08/2023    HGB 6.3 (L) 12/08/2023    HCT 19.0 (LL) 12/08/2023    MCV 88 12/08/2023     12/08/2023     No results for input(s): "TSH", "FREET4" in the last 168 hours.  Lab Results   Component Value Date    HGBA1C 7.6 (H) 10/12/2023       Nutritional status:   Body mass index is 26.04 kg/m².  Lab Results   Component Value Date    ALBUMIN 2.0 (L) 12/08/2023    ALBUMIN 2.0 (L) 12/08/2023    ALBUMIN 1.9 (L) 12/07/2023     Lab Results   Component Value Date    PREALBUMIN 19 (L) 12/01/2023    PREALBUMIN 27 11/15/2023       Estimated Creatinine Clearance: 77.4 mL/min (based on SCr of 1.1 mg/dL).    Accu-Checks  Recent Labs     12/06/23  1612 12/06/23  1941 12/06/23  1944 12/07/23  0757 12/07/23  1205 12/07/23  1652 12/07/23  1941 12/08/23  0014 12/08/23  0406 12/08/23  0717   POCTGLUCOSE 133* 248* 155* 144* 179* 165* 209* 180* 236* 215*       Current Medications and/or Treatments Impacting Glycemic Control  Immunotherapy:    Immunosuppressants       None          Steroids:   Hormones (From admission, onward)      None          Pressors:    Autonomic Drugs (From admission, onward)      Start     Stop Route Frequency Ordered    12/07/23 1430  EPINEPHrine 5 mg in dextrose 5% 250 mL infusion (premix)         -- IV Continuous 12/07/23 1428    12/07/23 0900  midodrine tablet 5 mg         -- PER NG TUBE 2 times daily 12/06/23 2157          Hyperglycemia/Diabetes Medications:   Antihyperglycemics (From admission, onward)      Start     Stop Route Frequency Ordered    12/08/23 1200  insulin aspart U-100 pen 3 Units         -- SubQ 6 times per day 12/08/23 0938    12/07/23 1329  insulin aspart U-100 pen 0-10 Units         -- SubQ Every 4 hours PRN 12/07/23 1231            ASSESSMENT and PLAN    Cardiac/Vascular  * Acute on chronic combined systolic and " diastolic CHF, NYHA class 4  Managed per primary team  Optimize BG control  S/p LVAD     Endocrine  Type 2 diabetes mellitus without complication, without long-term current use of insulin  BG goal 140-180. TF @ 40 mL/hr.     - TF increased to 40 mL per hour and BG excursions noted. Will start scheduled novolog 3 units every four hours.   - BG checks q4hr while on TF/NPO   - Novolog (aspart) insulin prn for BG excursions MDC (150/25)   - Hypoglycemia protocol in place    ** Please notify Endocrine for any change and/or advance in diet**  ** Please call Endocrine for any BG related issues **    Discharge Planning:   TBD. Please notify endocrinology prior to discharge.            Orthopedic  At high risk for skin breakdown  Optimize BG control to prevent infection           Ebony Carlson PA-C  Endocrinology  [facility]

## 2023-12-08 NOTE — PROGRESS NOTES
12/08/23 0202   Treatment   Treatment Type SLED   Treatment Status Daily equipment check   Dialysis Machine Number [id number]   Dialyzer Time (hours) 5.32   BVP (Liters) 49.2 L   Solutions Labeled and Current  Yes   Access Temporary Cath;Left;IJ   Catheter Dressing Intact  Yes   Alarms Engaged Yes   CRRT Comments daily check done

## 2023-12-09 PROBLEM — N17.9 AKI (ACUTE KIDNEY INJURY): Status: ACTIVE | Noted: 2023-12-09

## 2023-12-09 PROBLEM — I50.9 CONGESTIVE HEART FAILURE: Status: ACTIVE | Noted: 2023-12-09

## 2023-12-09 LAB
ALBUMIN SERPL BCP-MCNC: 2 G/DL (ref 3.5–5.2)
ALBUMIN SERPL BCP-MCNC: 2.1 G/DL (ref 3.5–5.2)
ALBUMIN SERPL BCP-MCNC: 2.1 G/DL (ref 3.5–5.2)
ALLENS TEST: ABNORMAL
ALLENS TEST: NORMAL
ALLENS TEST: NORMAL
ALP SERPL-CCNC: 147 U/L (ref 55–135)
ALT SERPL W/O P-5'-P-CCNC: 28 U/L (ref 10–44)
ANION GAP SERPL CALC-SCNC: 12 MMOL/L (ref 8–16)
ANION GAP SERPL CALC-SCNC: 13 MMOL/L (ref 8–16)
ANION GAP SERPL CALC-SCNC: 9 MMOL/L (ref 8–16)
APTT PPP: 42.7 SEC (ref 21–32)
APTT PPP: 43.8 SEC (ref 21–32)
APTT PPP: 54 SEC (ref 21–32)
APTT PPP: 55.2 SEC (ref 21–32)
AST SERPL-CCNC: 44 U/L (ref 10–40)
BASOPHILS # BLD AUTO: 0.03 K/UL (ref 0–0.2)
BASOPHILS NFR BLD: 0.3 % (ref 0–1.9)
BILIRUB SERPL-MCNC: 0.7 MG/DL (ref 0.1–1)
BUN SERPL-MCNC: 6 MG/DL (ref 8–23)
BUN SERPL-MCNC: 7 MG/DL (ref 8–23)
BUN SERPL-MCNC: 8 MG/DL (ref 8–23)
CA-I BLDV-SCNC: 1.01 MMOL/L (ref 1.06–1.42)
CA-I BLDV-SCNC: 1.03 MMOL/L (ref 1.06–1.42)
CA-I BLDV-SCNC: 1.03 MMOL/L (ref 1.06–1.42)
CALCIUM SERPL-MCNC: 8.1 MG/DL (ref 8.7–10.5)
CALCIUM SERPL-MCNC: 8.3 MG/DL (ref 8.7–10.5)
CALCIUM SERPL-MCNC: 8.3 MG/DL (ref 8.7–10.5)
CHLORIDE SERPL-SCNC: 106 MMOL/L (ref 95–110)
CHLORIDE SERPL-SCNC: 107 MMOL/L (ref 95–110)
CHLORIDE SERPL-SCNC: 108 MMOL/L (ref 95–110)
CO2 SERPL-SCNC: 20 MMOL/L (ref 23–29)
CO2 SERPL-SCNC: 24 MMOL/L (ref 23–29)
CO2 SERPL-SCNC: 24 MMOL/L (ref 23–29)
CREAT SERPL-MCNC: 0.7 MG/DL (ref 0.5–1.4)
CREAT SERPL-MCNC: 0.7 MG/DL (ref 0.5–1.4)
CREAT SERPL-MCNC: 0.8 MG/DL (ref 0.5–1.4)
DELSYS: ABNORMAL
DELSYS: NORMAL
DELSYS: NORMAL
DIFFERENTIAL METHOD BLD: ABNORMAL
EOSINOPHIL # BLD AUTO: 0.1 K/UL (ref 0–0.5)
EOSINOPHIL NFR BLD: 0.9 % (ref 0–8)
ERYTHROCYTE [DISTWIDTH] IN BLOOD BY AUTOMATED COUNT: 16.5 % (ref 11.5–14.5)
ERYTHROCYTE [SEDIMENTATION RATE] IN BLOOD BY WESTERGREN METHOD: 18 MM/H
EST. GFR  (NO RACE VARIABLE): >60 ML/MIN/1.73 M^2
FIBRINOGEN PPP-MCNC: 665 MG/DL (ref 182–400)
FIBRINOGEN PPP-MCNC: 678 MG/DL (ref 182–400)
FIBRINOGEN PPP-MCNC: 697 MG/DL (ref 182–400)
FIBRINOGEN PPP-MCNC: 712 MG/DL (ref 182–400)
FIO2: 40
FLOW: 5
GLUCOSE SERPL-MCNC: 156 MG/DL (ref 70–110)
GLUCOSE SERPL-MCNC: 182 MG/DL (ref 70–110)
GLUCOSE SERPL-MCNC: 219 MG/DL (ref 70–110)
HCO3 UR-SCNC: 23.5 MMOL/L (ref 24–28)
HCO3 UR-SCNC: 25.2 MMOL/L (ref 24–28)
HCO3 UR-SCNC: 26 MMOL/L (ref 24–28)
HCO3 UR-SCNC: 26.6 MMOL/L (ref 24–28)
HCO3 UR-SCNC: 27.4 MMOL/L (ref 24–28)
HCT VFR BLD AUTO: 21.2 % (ref 40–54)
HGB BLD-MCNC: 7.1 G/DL (ref 14–18)
IMM GRANULOCYTES # BLD AUTO: 0.37 K/UL (ref 0–0.04)
IMM GRANULOCYTES NFR BLD AUTO: 3.7 % (ref 0–0.5)
INR PPP: 1.1 (ref 0.8–1.2)
LDH SERPL L TO P-CCNC: 0.39 MMOL/L (ref 0.36–1.25)
LDH SERPL L TO P-CCNC: 0.56 MMOL/L (ref 0.36–1.25)
LDH SERPL L TO P-CCNC: 574 U/L (ref 110–260)
LYMPHOCYTES # BLD AUTO: 0.6 K/UL (ref 1–4.8)
LYMPHOCYTES NFR BLD: 6 % (ref 18–48)
MAGNESIUM SERPL-MCNC: 1.7 MG/DL (ref 1.6–2.6)
MAGNESIUM SERPL-MCNC: 1.7 MG/DL (ref 1.6–2.6)
MAGNESIUM SERPL-MCNC: 1.8 MG/DL (ref 1.6–2.6)
MAGNESIUM SERPL-MCNC: 1.8 MG/DL (ref 1.6–2.6)
MAGNESIUM SERPL-MCNC: 1.9 MG/DL (ref 1.6–2.6)
MAGNESIUM SERPL-MCNC: 2.2 MG/DL (ref 1.6–2.6)
MAP: 8.6
MCH RBC QN AUTO: 28.5 PG (ref 27–31)
MCHC RBC AUTO-ENTMCNC: 33.5 G/DL (ref 32–36)
MCV RBC AUTO: 85 FL (ref 82–98)
MIN VOL: 8.8
MIN VOL: 8.8
MIN VOL: 9
MIN VOL: 9
MIN VOL: 9.3
MODE: ABNORMAL
MODE: NORMAL
MODE: NORMAL
MONOCYTES # BLD AUTO: 0.6 K/UL (ref 0.3–1)
MONOCYTES NFR BLD: 6.3 % (ref 4–15)
NEUTROPHILS # BLD AUTO: 8.4 K/UL (ref 1.8–7.7)
NEUTROPHILS NFR BLD: 82.8 % (ref 38–73)
NRBC BLD-RTO: 1 /100 WBC
PCO2 BLDA: 30.6 MMHG (ref 35–45)
PCO2 BLDA: 33.4 MMHG (ref 35–45)
PCO2 BLDA: 35.2 MMHG (ref 35–45)
PCO2 BLDA: 43.6 MMHG (ref 35–45)
PCO2 BLDA: 44.1 MMHG (ref 35–45)
PEEP: 5
PH SMN: 7.38 [PH] (ref 7.35–7.45)
PH SMN: 7.41 [PH] (ref 7.35–7.45)
PH SMN: 7.49 [PH] (ref 7.35–7.45)
PHOSPHATE SERPL-MCNC: 1.3 MG/DL (ref 2.7–4.5)
PHOSPHATE SERPL-MCNC: 1.5 MG/DL (ref 2.7–4.5)
PHOSPHATE SERPL-MCNC: 2.6 MG/DL (ref 2.7–4.5)
PIP: 19
PIP: 19
PLATELET # BLD AUTO: 231 K/UL (ref 150–450)
PMV BLD AUTO: 11.8 FL (ref 9.2–12.9)
PO2 BLDA: 113 MMHG (ref 80–100)
PO2 BLDA: 153 MMHG (ref 80–100)
PO2 BLDA: 181 MMHG (ref 80–100)
PO2 BLDA: 29 MMHG (ref 40–60)
PO2 BLDA: 30 MMHG (ref 40–60)
POC BE: 0 MMOL/L
POC BE: 1 MMOL/L
POC BE: 2 MMOL/L
POC BE: 3 MMOL/L
POC BE: 3 MMOL/L
POC SATURATED O2: 100 % (ref 95–100)
POC SATURATED O2: 100 % (ref 95–100)
POC SATURATED O2: 53 % (ref 95–100)
POC SATURATED O2: 57 % (ref 95–100)
POC SATURATED O2: 99 % (ref 95–100)
POC TCO2: 24 MMOL/L (ref 23–27)
POC TCO2: 26 MMOL/L (ref 23–27)
POC TCO2: 27 MMOL/L (ref 24–29)
POC TCO2: 28 MMOL/L (ref 23–27)
POC TCO2: 29 MMOL/L (ref 24–29)
POCT GLUCOSE: 134 MG/DL (ref 70–110)
POCT GLUCOSE: 173 MG/DL (ref 70–110)
POCT GLUCOSE: 184 MG/DL (ref 70–110)
POCT GLUCOSE: 200 MG/DL (ref 70–110)
POCT GLUCOSE: 214 MG/DL (ref 70–110)
POCT GLUCOSE: 221 MG/DL (ref 70–110)
POCT GLUCOSE: 239 MG/DL (ref 70–110)
POCT GLUCOSE: 250 MG/DL (ref 70–110)
POTASSIUM SERPL-SCNC: 4.2 MMOL/L (ref 3.5–5.1)
POTASSIUM SERPL-SCNC: 4.4 MMOL/L (ref 3.5–5.1)
POTASSIUM SERPL-SCNC: 4.5 MMOL/L (ref 3.5–5.1)
PREALB SERPL-MCNC: 12 MG/DL (ref 20–43)
PROCALCITONIN SERPL IA-MCNC: 7.15 NG/ML
PROT SERPL-MCNC: 6.2 G/DL (ref 6–8.4)
PROTHROMBIN TIME: 11.9 SEC (ref 9–12.5)
PROTHROMBIN TIME: 12 SEC (ref 9–12.5)
PROTHROMBIN TIME: 12.1 SEC (ref 9–12.5)
PROTHROMBIN TIME: 12.2 SEC (ref 9–12.5)
PS: 10
RBC # BLD AUTO: 2.49 M/UL (ref 4.6–6.2)
SAMPLE: ABNORMAL
SAMPLE: NORMAL
SAMPLE: NORMAL
SITE: ABNORMAL
SITE: NORMAL
SITE: NORMAL
SODIUM SERPL-SCNC: 140 MMOL/L (ref 136–145)
SODIUM SERPL-SCNC: 141 MMOL/L (ref 136–145)
SODIUM SERPL-SCNC: 142 MMOL/L (ref 136–145)
SP02: 100
SPONT RATE: 21
SPONT RATE: 23
SPONT RATE: 23
VOL: 451
VT: 450
VT: 450
WBC # BLD AUTO: 10.13 K/UL (ref 3.9–12.7)

## 2023-12-09 PROCEDURE — 99900035 HC TECH TIME PER 15 MIN (STAT)

## 2023-12-09 PROCEDURE — 25000003 PHARM REV CODE 250

## 2023-12-09 PROCEDURE — 82803 BLOOD GASES ANY COMBINATION: CPT

## 2023-12-09 PROCEDURE — 85025 COMPLETE CBC W/AUTO DIFF WBC: CPT | Performed by: THORACIC SURGERY (CARDIOTHORACIC VASCULAR SURGERY)

## 2023-12-09 PROCEDURE — 83735 ASSAY OF MAGNESIUM: CPT | Mod: 91 | Performed by: THORACIC SURGERY (CARDIOTHORACIC VASCULAR SURGERY)

## 2023-12-09 PROCEDURE — 99233 SBSQ HOSP IP/OBS HIGH 50: CPT | Mod: ,,, | Performed by: PSYCHIATRY & NEUROLOGY

## 2023-12-09 PROCEDURE — 99291 CRITICAL CARE FIRST HOUR: CPT | Mod: ,,, | Performed by: PHYSICIAN ASSISTANT

## 2023-12-09 PROCEDURE — 25000003 PHARM REV CODE 250: Performed by: THORACIC SURGERY (CARDIOTHORACIC VASCULAR SURGERY)

## 2023-12-09 PROCEDURE — 37799 UNLISTED PX VASCULAR SURGERY: CPT

## 2023-12-09 PROCEDURE — 80069 RENAL FUNCTION PANEL: CPT | Performed by: STUDENT IN AN ORGANIZED HEALTH CARE EDUCATION/TRAINING PROGRAM

## 2023-12-09 PROCEDURE — 83615 LACTATE (LD) (LDH) ENZYME: CPT | Performed by: STUDENT IN AN ORGANIZED HEALTH CARE EDUCATION/TRAINING PROGRAM

## 2023-12-09 PROCEDURE — 63600367 HC NITRIC OXIDE PER HOUR

## 2023-12-09 PROCEDURE — 80069 RENAL FUNCTION PANEL: CPT | Mod: 91

## 2023-12-09 PROCEDURE — 20000000 HC ICU ROOM

## 2023-12-09 PROCEDURE — 83735 ASSAY OF MAGNESIUM: CPT | Mod: 91

## 2023-12-09 PROCEDURE — 27000248 HC VAD-ADDITIONAL DAY

## 2023-12-09 PROCEDURE — 63600175 PHARM REV CODE 636 W HCPCS: Performed by: THORACIC SURGERY (CARDIOTHORACIC VASCULAR SURGERY)

## 2023-12-09 PROCEDURE — 94761 N-INVAS EAR/PLS OXIMETRY MLT: CPT

## 2023-12-09 PROCEDURE — 25000003 PHARM REV CODE 250: Performed by: HOSPITALIST

## 2023-12-09 PROCEDURE — 85610 PROTHROMBIN TIME: CPT | Performed by: THORACIC SURGERY (CARDIOTHORACIC VASCULAR SURGERY)

## 2023-12-09 PROCEDURE — 85610 PROTHROMBIN TIME: CPT | Mod: 91 | Performed by: THORACIC SURGERY (CARDIOTHORACIC VASCULAR SURGERY)

## 2023-12-09 PROCEDURE — 99900017 HC EXTUBATION W/PARAMETERS (STAT)

## 2023-12-09 PROCEDURE — 80100008 HC CRRT DAILY MAINTENANCE

## 2023-12-09 PROCEDURE — 85384 FIBRINOGEN ACTIVITY: CPT | Mod: 91 | Performed by: THORACIC SURGERY (CARDIOTHORACIC VASCULAR SURGERY)

## 2023-12-09 PROCEDURE — 94799 UNLISTED PULMONARY SVC/PX: CPT

## 2023-12-09 PROCEDURE — 27000646 HC AEROBIKA DEVICE

## 2023-12-09 PROCEDURE — 82330 ASSAY OF CALCIUM: CPT | Mod: 91 | Performed by: STUDENT IN AN ORGANIZED HEALTH CARE EDUCATION/TRAINING PROGRAM

## 2023-12-09 PROCEDURE — 82330 ASSAY OF CALCIUM: CPT | Performed by: STUDENT IN AN ORGANIZED HEALTH CARE EDUCATION/TRAINING PROGRAM

## 2023-12-09 PROCEDURE — 27100171 HC OXYGEN HIGH FLOW UP TO 24 HOURS

## 2023-12-09 PROCEDURE — 94668 MNPJ CHEST WALL SBSQ: CPT

## 2023-12-09 PROCEDURE — 80053 COMPREHEN METABOLIC PANEL: CPT | Performed by: STUDENT IN AN ORGANIZED HEALTH CARE EDUCATION/TRAINING PROGRAM

## 2023-12-09 PROCEDURE — 95718 EEG PHYS/QHP 2-12 HR W/VEEG: CPT | Mod: ,,, | Performed by: PSYCHIATRY & NEUROLOGY

## 2023-12-09 PROCEDURE — 85730 THROMBOPLASTIN TIME PARTIAL: CPT | Mod: 91 | Performed by: THORACIC SURGERY (CARDIOTHORACIC VASCULAR SURGERY)

## 2023-12-09 PROCEDURE — 85730 THROMBOPLASTIN TIME PARTIAL: CPT | Performed by: THORACIC SURGERY (CARDIOTHORACIC VASCULAR SURGERY)

## 2023-12-09 PROCEDURE — 84134 ASSAY OF PREALBUMIN: CPT | Performed by: STUDENT IN AN ORGANIZED HEALTH CARE EDUCATION/TRAINING PROGRAM

## 2023-12-09 PROCEDURE — 25000003 PHARM REV CODE 250: Performed by: STUDENT IN AN ORGANIZED HEALTH CARE EDUCATION/TRAINING PROGRAM

## 2023-12-09 PROCEDURE — 94010 BREATHING CAPACITY TEST: CPT

## 2023-12-09 PROCEDURE — 90945 DIALYSIS ONE EVALUATION: CPT

## 2023-12-09 PROCEDURE — 63600175 PHARM REV CODE 636 W HCPCS

## 2023-12-09 PROCEDURE — 83735 ASSAY OF MAGNESIUM: CPT | Mod: 91 | Performed by: STUDENT IN AN ORGANIZED HEALTH CARE EDUCATION/TRAINING PROGRAM

## 2023-12-09 PROCEDURE — 84145 PROCALCITONIN (PCT): CPT | Performed by: INTERNAL MEDICINE

## 2023-12-09 PROCEDURE — 99291 CRITICAL CARE FIRST HOUR: CPT | Mod: ,,, | Performed by: INTERNAL MEDICINE

## 2023-12-09 PROCEDURE — 83735 ASSAY OF MAGNESIUM: CPT | Performed by: STUDENT IN AN ORGANIZED HEALTH CARE EDUCATION/TRAINING PROGRAM

## 2023-12-09 PROCEDURE — 93750 INTERROGATION VAD IN PERSON: CPT | Mod: ,,, | Performed by: INTERNAL MEDICINE

## 2023-12-09 PROCEDURE — 99233 SBSQ HOSP IP/OBS HIGH 50: CPT | Mod: ,,, | Performed by: HOSPITALIST

## 2023-12-09 PROCEDURE — 84132 ASSAY OF SERUM POTASSIUM: CPT | Mod: 91 | Performed by: THORACIC SURGERY (CARDIOTHORACIC VASCULAR SURGERY)

## 2023-12-09 PROCEDURE — 84100 ASSAY OF PHOSPHORUS: CPT | Performed by: THORACIC SURGERY (CARDIOTHORACIC VASCULAR SURGERY)

## 2023-12-09 PROCEDURE — C9113 INJ PANTOPRAZOLE SODIUM, VIA: HCPCS

## 2023-12-09 PROCEDURE — 83605 ASSAY OF LACTIC ACID: CPT

## 2023-12-09 PROCEDURE — 25000003 PHARM REV CODE 250: Performed by: INTERNAL MEDICINE

## 2023-12-09 PROCEDURE — 99233 SBSQ HOSP IP/OBS HIGH 50: CPT | Mod: ,,, | Performed by: INTERNAL MEDICINE

## 2023-12-09 PROCEDURE — 94664 DEMO&/EVAL PT USE INHALER: CPT

## 2023-12-09 PROCEDURE — 94003 VENT MGMT INPAT SUBQ DAY: CPT

## 2023-12-09 PROCEDURE — 63600175 PHARM REV CODE 636 W HCPCS: Performed by: STUDENT IN AN ORGANIZED HEALTH CARE EDUCATION/TRAINING PROGRAM

## 2023-12-09 PROCEDURE — 99900026 HC AIRWAY MAINTENANCE (STAT)

## 2023-12-09 PROCEDURE — 84132 ASSAY OF SERUM POTASSIUM: CPT | Performed by: THORACIC SURGERY (CARDIOTHORACIC VASCULAR SURGERY)

## 2023-12-09 PROCEDURE — 63600175 PHARM REV CODE 636 W HCPCS: Performed by: INTERNAL MEDICINE

## 2023-12-09 PROCEDURE — 94150 VITAL CAPACITY TEST: CPT

## 2023-12-09 PROCEDURE — 63600175 PHARM REV CODE 636 W HCPCS: Performed by: HOSPITALIST

## 2023-12-09 RX ORDER — HYDROCODONE BITARTRATE AND ACETAMINOPHEN 500; 5 MG/1; MG/1
TABLET ORAL CONTINUOUS
Status: DISCONTINUED | OUTPATIENT
Start: 2023-12-09 | End: 2023-12-09

## 2023-12-09 RX ORDER — MIDODRINE HYDROCHLORIDE 5 MG/1
5 TABLET ORAL 3 TIMES DAILY
Status: DISCONTINUED | OUTPATIENT
Start: 2023-12-09 | End: 2023-12-09

## 2023-12-09 RX ORDER — MAGNESIUM SULFATE HEPTAHYDRATE 40 MG/ML
2 INJECTION, SOLUTION INTRAVENOUS
Status: DISPENSED | OUTPATIENT
Start: 2023-12-09 | End: 2023-12-10

## 2023-12-09 RX ORDER — NOREPINEPHRINE BITARTRATE/D5W 4MG/250ML
0-3 PLASTIC BAG, INJECTION (ML) INTRAVENOUS CONTINUOUS
Status: DISCONTINUED | OUTPATIENT
Start: 2023-12-09 | End: 2023-12-10

## 2023-12-09 RX ORDER — NOREPINEPHRINE BITARTRATE/D5W 4MG/250ML
PLASTIC BAG, INJECTION (ML) INTRAVENOUS
Status: COMPLETED
Start: 2023-12-09 | End: 2023-12-09

## 2023-12-09 RX ORDER — DOBUTAMINE HYDROCHLORIDE 400 MG/100ML
2.5 INJECTION INTRAVENOUS CONTINUOUS
Status: DISCONTINUED | OUTPATIENT
Start: 2023-12-09 | End: 2023-12-10

## 2023-12-09 RX ORDER — MIDODRINE HYDROCHLORIDE 5 MG/1
10 TABLET ORAL 3 TIMES DAILY
Status: DISCONTINUED | OUTPATIENT
Start: 2023-12-09 | End: 2023-12-09

## 2023-12-09 RX ORDER — INSULIN ASPART 100 [IU]/ML
5 INJECTION, SOLUTION INTRAVENOUS; SUBCUTANEOUS
Status: DISCONTINUED | OUTPATIENT
Start: 2023-12-09 | End: 2023-12-10

## 2023-12-09 RX ORDER — MIDODRINE HYDROCHLORIDE 5 MG/1
15 TABLET ORAL 3 TIMES DAILY
Status: DISCONTINUED | OUTPATIENT
Start: 2023-12-09 | End: 2023-12-19

## 2023-12-09 RX ORDER — MAGNESIUM SULFATE HEPTAHYDRATE 40 MG/ML
2 INJECTION, SOLUTION INTRAVENOUS
Status: DISCONTINUED | OUTPATIENT
Start: 2023-12-09 | End: 2023-12-09

## 2023-12-09 RX ADMIN — ACETAMINOPHEN 650 MG: 325 TABLET ORAL at 10:12

## 2023-12-09 RX ADMIN — INSULIN ASPART 2 UNITS: 100 INJECTION, SOLUTION INTRAVENOUS; SUBCUTANEOUS at 11:12

## 2023-12-09 RX ADMIN — ATORVASTATIN CALCIUM 40 MG: 10 TABLET, FILM COATED ORAL at 08:12

## 2023-12-09 RX ADMIN — WARFARIN SODIUM 2 MG: 2 TABLET ORAL at 08:12

## 2023-12-09 RX ADMIN — SENNOSIDES AND DOCUSATE SODIUM 2 TABLET: 8.6; 5 TABLET ORAL at 08:12

## 2023-12-09 RX ADMIN — MIDODRINE HYDROCHLORIDE 15 MG: 5 TABLET ORAL at 08:12

## 2023-12-09 RX ADMIN — PIPERACILLIN SODIUM AND TAZOBACTAM SODIUM 4.5 G: 4; .5 INJECTION, POWDER, FOR SOLUTION INTRAVENOUS at 03:12

## 2023-12-09 RX ADMIN — PIPERACILLIN SODIUM AND TAZOBACTAM SODIUM 4.5 G: 4; .5 INJECTION, POWDER, FOR SOLUTION INTRAVENOUS at 08:12

## 2023-12-09 RX ADMIN — INSULIN ASPART 5 UNITS: 100 INJECTION, SOLUTION INTRAVENOUS; SUBCUTANEOUS at 03:12

## 2023-12-09 RX ADMIN — DEXTROSE MONOHYDRATE, SODIUM CITRATE, AND CITRIC ACID MONOHYDRATE: 2.45; 2.2; .8 INJECTION, SOLUTION INTRAVENOUS at 01:12

## 2023-12-09 RX ADMIN — PIPERACILLIN SODIUM AND TAZOBACTAM SODIUM 4.5 G: 4; .5 INJECTION, POWDER, FOR SOLUTION INTRAVENOUS at 11:12

## 2023-12-09 RX ADMIN — DEXTROSE MONOHYDRATE, SODIUM CITRATE, AND CITRIC ACID MONOHYDRATE: 2.45; 2.2; .8 INJECTION, SOLUTION INTRAVENOUS at 09:12

## 2023-12-09 RX ADMIN — INSULIN ASPART 3 UNITS: 100 INJECTION, SOLUTION INTRAVENOUS; SUBCUTANEOUS at 08:12

## 2023-12-09 RX ADMIN — SODIUM PHOSPHATE, MONOBASIC, MONOHYDRATE AND SODIUM PHOSPHATE, DIBASIC, ANHYDROUS 39.99 MMOL: 142; 276 INJECTION, SOLUTION INTRAVENOUS at 10:12

## 2023-12-09 RX ADMIN — INSULIN ASPART 5 UNITS: 100 INJECTION, SOLUTION INTRAVENOUS; SUBCUTANEOUS at 11:12

## 2023-12-09 RX ADMIN — PANTOPRAZOLE SODIUM 40 MG: 40 INJECTION, POWDER, FOR SOLUTION INTRAVENOUS at 08:12

## 2023-12-09 RX ADMIN — CALCIUM GLUCONATE: 98 INJECTION, SOLUTION INTRAVENOUS at 02:12

## 2023-12-09 RX ADMIN — INSULIN ASPART 3 UNITS: 100 INJECTION, SOLUTION INTRAVENOUS; SUBCUTANEOUS at 04:12

## 2023-12-09 RX ADMIN — INSULIN ASPART 2 UNITS: 100 INJECTION, SOLUTION INTRAVENOUS; SUBCUTANEOUS at 08:12

## 2023-12-09 RX ADMIN — INSULIN ASPART 2 UNITS: 100 INJECTION, SOLUTION INTRAVENOUS; SUBCUTANEOUS at 03:12

## 2023-12-09 RX ADMIN — INSULIN ASPART 3 UNITS: 100 INJECTION, SOLUTION INTRAVENOUS; SUBCUTANEOUS at 12:12

## 2023-12-09 RX ADMIN — CALCIUM GLUCONATE: 98 INJECTION, SOLUTION INTRAVENOUS at 06:12

## 2023-12-09 RX ADMIN — MIDODRINE HYDROCHLORIDE 5 MG: 5 TABLET ORAL at 01:12

## 2023-12-09 RX ADMIN — Medication 0.04 MCG/KG/MIN: at 06:12

## 2023-12-09 RX ADMIN — Medication 324 MG: at 08:12

## 2023-12-09 RX ADMIN — HEPARIN SODIUM 1300 UNITS/HR: 10000 INJECTION, SOLUTION INTRAVENOUS at 02:12

## 2023-12-09 RX ADMIN — AMIODARONE HYDROCHLORIDE 400 MG: 200 TABLET ORAL at 08:12

## 2023-12-09 RX ADMIN — CALCIUM GLUCONATE: 98 INJECTION, SOLUTION INTRAVENOUS at 09:12

## 2023-12-09 RX ADMIN — POLYETHYLENE GLYCOL 3350 17 G: 17 POWDER, FOR SOLUTION ORAL at 08:12

## 2023-12-09 RX ADMIN — INSULIN ASPART 2 UNITS: 100 INJECTION, SOLUTION INTRAVENOUS; SUBCUTANEOUS at 12:12

## 2023-12-09 RX ADMIN — NOREPINEPHRINE BITARTRATE 0.04 MCG/KG/MIN: 4 INJECTION, SOLUTION INTRAVENOUS at 06:12

## 2023-12-09 RX ADMIN — CALCIUM GLUCONATE: 98 INJECTION, SOLUTION INTRAVENOUS at 01:12

## 2023-12-09 RX ADMIN — DEXMEDETOMIDINE HYDROCHLORIDE 1.4 MCG/KG/HR: 4 INJECTION, SOLUTION INTRAVENOUS at 01:12

## 2023-12-09 RX ADMIN — PIPERACILLIN SODIUM AND TAZOBACTAM SODIUM 4.5 G: 4; .5 INJECTION, POWDER, FOR SOLUTION INTRAVENOUS at 12:12

## 2023-12-09 RX ADMIN — VASOPRESSIN 0.04 UNITS/MIN: 20 INJECTION INTRAVENOUS at 05:12

## 2023-12-09 RX ADMIN — MAGNESIUM SULFATE HEPTAHYDRATE 2 G: 40 INJECTION, SOLUTION INTRAVENOUS at 09:12

## 2023-12-09 NOTE — PT/OT/SLP PROGRESS
Speech Language Pathology      Radha Abbott  MRN: [id number]    Patient not seen today secondary to (RN Hold following recent extubation). SLP will follow-up as able and appropriate.  12/9/2023

## 2023-12-09 NOTE — ASSESSMENT & PLAN NOTE
63 year old gentleman s/p LVAD placement with persistent post operative encephalopathy and neurology consulted after patient had to be re intubated. He had CT head unremarkable for ischemia.   Lab work with improving CBC and renal function. He had elevated procal and BALs from bronch showing GNRs and ID consulted and started zosyn.   EEG 12/8: showed severe generalized slowing with triphasic waves seen throughout, suggestive of severe diffuse cerebral dysfunction, likely of metabolic etiology.      Patient extubated this morning, off sedation and no focal deficits. No focal neurological deficits. At baseline per family at bedside. Believe multifactorial etiology to worsening mentation, metabolic, infections and volume changes from LVAD.     Recommendations   -- Okay to d.c EEG, told EEG tech and will message epileptologist   -- no further diagnostics   -- avoid oversedating medications

## 2023-12-09 NOTE — PLAN OF CARE
SICU PLAN OF CARE NOTE    Dx: Acute on chronic combined systolic and diastolic CHF, NYHA class 4    Goals of Care: MAP 70-80    Vital Signs (last 12 hours):   Temp:  [97.6 °F (36.4 °C)-99.3 °F (37.4 °C)]   Pulse:  []   BP: (78-80)/(0)   SpO2:  [98 %-100 %]   Arterial Line BP: (72-96)/(56-72)      Neuro: Follows Commands and Moves All Extremities    Cardiac:    Respiratory: Nasal Cannula w/ Ketty. Extubated and placed on 5 L nasal cannula. Later in shift placed on 5 Nitric    Gtts: Epinephrine , Vasopressin, Dobutamine, and Heparin    Urine Output: Anuric 0 cc/shift    Dialysis: SLED, UF Rate: 250/hr    Diet: NPO and Tube Feeds    VAD Speed 4900 . See flowsheets for more details     Labs/Accuchecks: q4 accuchecks    Skin: lines and devices free from skin contact. Pressure points protected. Heels elevated off of bed. Pt repositioned q2 hrs.     Shift Events: see previous notes.

## 2023-12-09 NOTE — SUBJECTIVE & OBJECTIVE
Interval History/Significant Events: NAEO. Pt alert this AM following commands. Epi off, vaso at 0.02mcg/kg/min    Follow-up For: Procedure(s) (LRB):  CLOSURE, WOUND, STERNUM (N/A)  INSERTION, GRAFT, PERICARDIUM  DRAINAGE, PLEURAL EFFUSION    Post-Operative Day: 5 Days Post-Op    Objective:     Vital Signs (Most Recent):  Temp: 99.3 °F (37.4 °C) (12/09/23 0715)  Pulse: 82 (12/09/23 1010)  Resp: 18 (12/08/23 2305)  BP: (!) 80/0 (12/09/23 0800)  SpO2: 100 % (12/09/23 1021) Vital Signs (24h Range):  Temp:  [97.8 °F (36.6 °C)-99.3 °F (37.4 °C)] 99.3 °F (37.4 °C)  Pulse:  [75-88] 82  Resp:  [18-25] 18  SpO2:  [98 %-100 %] 100 %  BP: (68-85)/(0) 80/0  Arterial Line BP: (70-99)/(56-74) 79/61     Weight: 87.1 kg (192 lb)  Body mass index is 26.04 kg/m².      Intake/Output Summary (Last 24 hours) at 12/9/2023 1135  Last data filed at 12/9/2023 1115  Gross per 24 hour   Intake 6753.65 ml   Output 8572 ml   Net -1818.35 ml          Physical Exam  Constitutional:       General: He is not in acute distress.     Comments: Intubated and sedated   HENT:      Head: Normocephalic and atraumatic.      Mouth/Throat:      Comments: ETT and OGT in place  Eyes:      Extraocular Movements: Extraocular movements intact.      Pupils: Pupils are equal, round, and reactive to light.   Cardiovascular:      Rate and Rhythm: Normal rate and regular rhythm.   Pulmonary:      Effort: Pulmonary effort is normal.      Comments: Intubated on spontaneous on ventilator   Abdominal:      General: Abdomen is flat.   Musculoskeletal:         General: Normal range of motion.   Skin:     General: Skin is warm and dry.   Neurological:      General: No focal deficit present.      Mental Status: He is alert.      Comments: sedated            Vents:  Vent Mode: Spont (12/09/23 0747)  Ventilator Initiated: Yes (12/06/23 1233)  Set Rate: 18 BPM (12/09/23 0025)  Vt Set: 450 mL (12/09/23 0025)  Pressure Support: 10 cmH20 (12/09/23 0747)  PEEP/CPAP: 5 cmH20 (12/09/23  0747)  Oxygen Concentration (%): 40 (12/09/23 0915)  Peak Airway Pressure: 15 cmH20 (12/09/23 0747)  Plateau Pressure: 18 cmH20 (12/09/23 0747)  Total Ve: 9.25 L/m (12/09/23 0747)  Negative Inspiratory Force (cm H2O): -25 (12/09/23 1010)  F/VT Ratio<105 (RSBI): (!) 57.47 (12/08/23 1535)    Lines/Drains/Airways       Peripherally Inserted Central Catheter Line  Duration             PICC Triple Lumen 12/05/23 1208 right basilic 3 days              Central Venous Catheter Line  Duration             Trialysis (Dialysis) Catheter 12/01/23 1530 left internal jugular 7 days              Drain  Duration                  NG/OG Tube 12/04/23 1730 Left nostril 4 days              Arterial Line  Duration             Arterial Line 12/06/23 1526 Left Radial 2 days              Line  Duration                  VAD 12/01/23 1015 Left ventricular assist device HeartMate 3 8 days              Peripheral Intravenous Line  Duration                  Peripheral IV - Single Lumen 12/08/23 0620 20 G Left Forearm 1 day                    Significant Labs:    CBC/Anemia Profile:  Recent Labs   Lab 12/07/23  1206 12/07/23  1937 12/08/23  0408 12/09/23  0323   WBC 10.31  --  10.42 10.13   HGB 7.0*  --  6.3* 7.1*   HCT 20.6* 18* 19.0* 21.2*     --  246 231   MCV 90  --  88 85   RDW 16.1*  --  15.9* 16.5*        Chemistries:  Recent Labs   Lab 12/08/23  0408 12/08/23  1110 12/08/23  1450 12/08/23  1725 12/08/23  2136 12/09/23  0009 12/09/23  0323     --  142  --  142  --  141   K 4.3   < > 4.1   < > 4.2 4.2 4.2     --  105  --  107  --  108   CO2 21*  --  28  --  26  --  24   BUN 13  --  10  --  7*  --  8   CREATININE 1.1  --  1.0  --  0.7  --  0.8   CALCIUM 8.1*  --  7.8*  --  8.1*  --  8.1*   ALBUMIN 2.0*  2.0*  --  1.9*  --  1.9*  --  2.0*   PROT 5.7*  --   --   --   --   --  6.2   BILITOT 0.8  --   --   --   --   --  0.7   ALKPHOS 139*  --   --   --   --   --  147*   ALT 26  --   --   --   --   --  28   AST 55*  --   --    --   --   --  44*   MG 1.9   < > 2.0   < > 1.9 1.8 1.7   PHOS 1.1*  --  1.3*  --  2.0*  --  1.5*    < > = values in this interval not displayed.       All pertinent labs within the past 24 hours have been reviewed.    Significant Imaging:  I have reviewed all pertinent imaging results/findings within the past 24 hours.

## 2023-12-09 NOTE — ASSESSMENT & PLAN NOTE
Neuro/Psych:   -- Sedation: none; seroquel 50 Q6 PRN for agitation  -- Pain: d/c all narcotics             Cardiac:   -- S/P redo sternotomy LVAD with Dr. Washington on 12/01, chest closed 12/4  -- MAP Goal: 70-80  -- Cardene PRN  -- Pressors: vaso @ 0.02mcg/kg/min,  5; currently off epi  -- midodrine 5 TID, will increase to 10mg TID if unable to wean vasopressin off  -- Anti-HTNs: none  -- Rhythm: NSR  -- amiodarone 400mg daily  -- LVAD heartmate III: RPM 4800, flow 3.6  -- Statin: Atorvastatin 40 mg QD  -- No ASA at this time  -- heparin 1000/hr, PTT goal 35-45  -- coumadin 2mg      Pulmonary:   -- Goal SpO2 >92%  -- supplemental O2 as needed      Renal:  -- Trend BUN/Cr   -- Maintain [place], record strict Is/Os  -- on SLED overnight, removed 3.63L  -- net -1L/24 hrs goal    Recent Labs   Lab 12/08/23  1450 12/08/23  2136 12/09/23  0323   BUN 10 7* 8   CREATININE 1.0 0.7 0.8        FEN / GI:   -- Daily CMP, PRN K/Mag/Phos per protocol   -- Replace electrolytes as needed  -- Nutrition: TF at goal 40/hr, having BM  -- Bowel Regimen: Miralax, docusate  -- SLP consult       ID:   -- Afebrile  -- WBC stable  -- Abx: zosyn 4.5g Q8H x5 days    Recent Labs   Lab 12/07/23  1206 12/08/23  0408 12/09/23  0323   WBC 10.31 10.42 10.13           Heme/Onc:   -- Hgb 8.7 pre-operatively  -- received 7 RBC, 2 FFP, 1 platelet, 10 cry intra-op  -- 12/1: 1 RBC, 4 FFP, 2 plts, 1 cryo  -- q4 CBC and coags  -- q4 fibrinogen - keep fibrinogen >300  -- holding ASA, on coumadin       Recent Labs   Lab 12/07/23  1206 12/07/23  1655 12/08/23  0408 12/08/23  0716 12/08/23  1725 12/09/23  0009 12/09/23  0154 12/09/23  0323 12/09/23  0813   HGB 7.0*  --  6.3*  --   --   --   --  7.1*  --      --  246  --   --   --   --  231  --    APTT 27.7   < > 34.3*   < > 35.2*  35.2*  --  42.7*  --  54.0*   INR 1.1   < > 1.1   < > 1.1 1.1  --  1.1  --     < > = values in this interval not displayed.           Endocrine:   -- CTS Goal -140  --  HgbA1c: 7.6  -- Endocrinology consulted for insulin management      PPx:   Feeding:TF at goal  Analgesia/Sedation: propofol/seroquel 50 Q6  Thromboembolic Prevention: SCDs  HOB >30: Yes  Stress Ulcer: pantoprazole   Glucose Control: Yes, insulin management per Endocrinology  Bowel reg: Miralax, docusate  Invasive Lines/Drains/Airway:   OGT  Left radial arterial line    PICC line   LVAD     Dispo/Code Status/Palliative:     - Continue SICU Care    - Full Code

## 2023-12-09 NOTE — PROGRESS NOTES
[facility] - Surgical Intensive Care  Critical Care - Surgery  Progress Note    Patient Name: Radha Abbott  MRN: [id number]  Admission Date: 11/9/2023  Hospital Length of Stay: 30 days  Code Status: Full Code  Attending Provider: Yuri Washington MD  Primary Care Provider: Vasu Kong MD   Principal Problem: Acute on chronic combined systolic and diastolic CHF, NYHA class 4    Subjective:       Interval History/Significant Events: NAEO. Pt alert this AM following commands. Epi off, vaso at 0.02mcg/kg/min    Follow-up For: Procedure(s) (LRB):  CLOSURE, WOUND, STERNUM (N/A)  INSERTION, GRAFT, PERICARDIUM  DRAINAGE, PLEURAL EFFUSION    Post-Operative Day: 5 Days Post-Op    Objective:     Vital Signs (Most Recent):  Temp: 99.3 °F (37.4 °C) (12/09/23 0715)  Pulse: 82 (12/09/23 1010)  Resp: 18 (12/08/23 2305)  BP: (!) 80/0 (12/09/23 0800)  SpO2: 100 % (12/09/23 1021) Vital Signs (24h Range):  Temp:  [97.8 °F (36.6 °C)-99.3 °F (37.4 °C)] 99.3 °F (37.4 °C)  Pulse:  [75-88] 82  Resp:  [18-25] 18  SpO2:  [98 %-100 %] 100 %  BP: (68-85)/(0) 80/0  Arterial Line BP: (70-99)/(56-74) 79/61     Weight: 87.1 kg (192 lb)  Body mass index is 26.04 kg/m².      Intake/Output Summary (Last 24 hours) at 12/9/2023 1135  Last data filed at 12/9/2023 1115  Gross per 24 hour   Intake 6753.65 ml   Output 8572 ml   Net -1818.35 ml          Physical Exam  Constitutional:       General: He is not in acute distress.     Comments: Intubated and sedated   HENT:      Head: Normocephalic and atraumatic.      Mouth/Throat:      Comments: ETT and OGT in place  Eyes:      Extraocular Movements: Extraocular movements intact.      Pupils: Pupils are equal, round, and reactive to light.   Cardiovascular:      Rate and Rhythm: Normal rate and regular rhythm.   Pulmonary:      Effort: Pulmonary effort is normal.      Comments: Intubated on spontaneous on ventilator   Abdominal:      General: Abdomen is flat.   Musculoskeletal:         General: Normal  range of motion.   Skin:     General: Skin is warm and dry.   Neurological:      General: No focal deficit present.      Mental Status: He is alert.      Comments: sedated            Vents:  Vent Mode: Spont (12/09/23 0747)  Ventilator Initiated: Yes (12/06/23 1233)  Set Rate: 18 BPM (12/09/23 0025)  Vt Set: 450 mL (12/09/23 0025)  Pressure Support: 10 cmH20 (12/09/23 0747)  PEEP/CPAP: 5 cmH20 (12/09/23 0747)  Oxygen Concentration (%): 40 (12/09/23 0915)  Peak Airway Pressure: 15 cmH20 (12/09/23 0747)  Plateau Pressure: 18 cmH20 (12/09/23 0747)  Total Ve: 9.25 L/m (12/09/23 0747)  Negative Inspiratory Force (cm H2O): -25 (12/09/23 1010)  F/VT Ratio<105 (RSBI): (!) 57.47 (12/08/23 1535)    Lines/Drains/Airways       Peripherally Inserted Central Catheter Line  Duration             PICC Triple Lumen 12/05/23 1208 right basilic 3 days              Central Venous Catheter Line  Duration             Trialysis (Dialysis) Catheter 12/01/23 1530 left internal jugular 7 days              Drain  Duration                  NG/OG Tube 12/04/23 1730 Left nostril 4 days              Arterial Line  Duration             Arterial Line 12/06/23 1526 Left Radial 2 days              Line  Duration                  VAD 12/01/23 1015 Left ventricular assist device HeartMate 3 8 days              Peripheral Intravenous Line  Duration                  Peripheral IV - Single Lumen 12/08/23 0620 20 G Left Forearm 1 day                    Significant Labs:    CBC/Anemia Profile:  Recent Labs   Lab 12/07/23  1206 12/07/23  1937 12/08/23  0408 12/09/23  0323   WBC 10.31  --  10.42 10.13   HGB 7.0*  --  6.3* 7.1*   HCT 20.6* 18* 19.0* 21.2*     --  246 231   MCV 90  --  88 85   RDW 16.1*  --  15.9* 16.5*        Chemistries:  Recent Labs   Lab 12/08/23  0408 12/08/23  1110 12/08/23  1450 12/08/23  1725 12/08/23  2136 12/09/23  0009 12/09/23  0323     --  142  --  142  --  141   K 4.3   < > 4.1   < > 4.2 4.2 4.2     --  105  --   107  --  108   CO2 21*  --  28  --  26  --  24   BUN 13  --  10  --  7*  --  8   CREATININE 1.1  --  1.0  --  0.7  --  0.8   CALCIUM 8.1*  --  7.8*  --  8.1*  --  8.1*   ALBUMIN 2.0*  2.0*  --  1.9*  --  1.9*  --  2.0*   PROT 5.7*  --   --   --   --   --  6.2   BILITOT 0.8  --   --   --   --   --  0.7   ALKPHOS 139*  --   --   --   --   --  147*   ALT 26  --   --   --   --   --  28   AST 55*  --   --   --   --   --  44*   MG 1.9   < > 2.0   < > 1.9 1.8 1.7   PHOS 1.1*  --  1.3*  --  2.0*  --  1.5*    < > = values in this interval not displayed.       All pertinent labs within the past 24 hours have been reviewed.    Significant Imaging:  I have reviewed all pertinent imaging results/findings within the past 24 hours.  Assessment/Plan:     Cardiac/Vascular  * Acute on chronic combined systolic and diastolic CHF, NYHA class 4    Neuro/Psych:   -- Sedation: none; seroquel 50 Q6 PRN for agitation  -- Pain: d/c all narcotics             Cardiac:   -- S/P redo sternotomy LVAD with Dr. Washington on 12/01, chest closed 12/4  -- MAP Goal: 70-80  -- Cardene PRN  -- Pressors: vaso @ 0.02mcg/kg/min,  5; currently off epi  -- midodrine 5 TID, will increase to 10mg TID if unable to wean vasopressin off  -- Anti-HTNs: none  -- Rhythm: NSR  -- amiodarone 400mg daily  -- LVAD heartmate III: RPM 4800, flow 3.6  -- Statin: Atorvastatin 40 mg QD  -- No ASA at this time  -- heparin 1000/hr, PTT goal 35-45  -- coumadin 2mg      Pulmonary:   -- Goal SpO2 >92%  -- supplemental O2 as needed      Renal:  -- Trend BUN/Cr   -- Maintain [place], record strict Is/Os  -- on SLED overnight, removed 3.63L  -- net -1L/24 hrs goal    Recent Labs   Lab 12/08/23  1450 12/08/23  2136 12/09/23  0323   BUN 10 7* 8   CREATININE 1.0 0.7 0.8        FEN / GI:   -- Daily CMP, PRN K/Mag/Phos per protocol   -- Replace electrolytes as needed  -- Nutrition: TF at goal 40/hr, having BM  -- Bowel Regimen: Miralax, docusate  -- SLP consult       ID:   -- Afebrile  --  WBC stable  -- Abx: zosyn 4.5g Q8H x5 days    Recent Labs   Lab 12/07/23  1206 12/08/23  0408 12/09/23  0323   WBC 10.31 10.42 10.13           Heme/Onc:   -- Hgb 8.7 pre-operatively  -- received 7 RBC, 2 FFP, 1 platelet, 10 cry intra-op  -- 12/1: 1 RBC, 4 FFP, 2 plts, 1 cryo  -- q4 CBC and coags  -- q4 fibrinogen - keep fibrinogen >300  -- holding ASA, on coumadin       Recent Labs   Lab 12/07/23  1206 12/07/23  1655 12/08/23  0408 12/08/23  0716 12/08/23  1725 12/09/23  0009 12/09/23  0154 12/09/23  0323 12/09/23  0813   HGB 7.0*  --  6.3*  --   --   --   --  7.1*  --      --  246  --   --   --   --  231  --    APTT 27.7   < > 34.3*   < > 35.2*  35.2*  --  42.7*  --  54.0*   INR 1.1   < > 1.1   < > 1.1 1.1  --  1.1  --     < > = values in this interval not displayed.           Endocrine:   -- CTS Goal -140  -- HgbA1c: 7.6  -- Endocrinology consulted for insulin management      PPx:   Feeding:TF at goal  Analgesia/Sedation: propofol/seroquel 50 Q6  Thromboembolic Prevention: SCDs  HOB >30: Yes  Stress Ulcer: pantoprazole   Glucose Control: Yes, insulin management per Endocrinology  Bowel reg: Miralax, docusate  Invasive Lines/Drains/Airway:   OGT  Left radial arterial line    PICC line   LVAD     Dispo/Code Status/Palliative:     - Continue SICU Care    - Full Code         Critical care was time spent personally by me on the following activities: development of treatment plan with patient or surrogate and bedside caregivers, discussions with consultants, evaluation of patient's response to treatment, examination of patient, ordering and performing treatments and interventions, ordering and review of laboratory studies, ordering and review of radiographic studies, pulse oximetry, re-evaluation of patient's condition.  This critical care time did not overlap with that of any other provider or involve time for any procedures.     Ashanti Pete MD  Critical Care - Surgery  [facility] - Surgical Intensive  Care

## 2023-12-09 NOTE — CARE UPDATE
SICU Staff Addendum  Please see resident/prasanna note from 12/9/2023 for full details of the patients history of present illness / progress note for today. I have performed a substantial portion of the critical care visit.I have reviewed and concur with the PRASANNA/resident's history, physical, assessment, and plan.  I have personally interviewed and examined the patient at bedside.  See below for any additional findings.    Reason for admission:  Acute on chronic combined systolic and diastolic CHF, NYHA class 4  Present on Admission:   Acute on chronic combined systolic and diastolic CHF, NYHA class 4   Ventricular tachycardia   Type 2 diabetes mellitus without complication, without long-term current use of insulin   CAD (coronary artery disease)   PAF (paroxysmal atrial fibrillation)   Acute renal failure with acute tubular necrosis superimposed on stage 3b chronic kidney disease   (Resolved) Atrial fibrillation with tachycardic ventricular rate   At high risk for skin breakdown      Goals for Today:   - POD 8 s/p LVAD insertion with Dr. Washington  - no issues w LVAD overnight, flowing in the upper 3s @ 4900 RPM   - Successfully extubated today with parameters. EEG negative for seizure activity. DC today.   - Chest PT, aerobika, incentive spirometer. Empiric 5 day course of zosyn for concern of PNA   - Epi off, dobutamine at 5, starting midodrine 5 TID.  - CRRT going, targetting net neg 1L   - formal SLP evaluation   - cont tube feeds at goal  - 2mg warfarin tonight. Heparin per genesis      45  minutes of critical care time was spent personally by me on the following activities: development of treatment plan with patient or surrogate and bedside caregivers, discussions with consultants, evaluation of patient's response to treatment, examination of patient, ordering and performing treatments and interventions, ordering and review of laboratory studies, ordering and review of radiographic studies, pulse oximetry,  re-evaluation of patient's condition.  This critical care time did not overlap with that of any other provider or involve time for any procedures.    Deysi Hood MD  Anesthesia Critical Care  [facility] [phone]

## 2023-12-09 NOTE — SUBJECTIVE & OBJECTIVE
"Interval History: Alert and nods to questions. Brother, Mumtaz, at bedside.     Review of Systems   Unable to perform ROS: Intubated     Objective:     Vital Signs (Most Recent):  Temp: 99.3 °F (37.4 °C) (12/09/23 0715)  Pulse: 82 (12/09/23 1010)  Resp: 18 (12/08/23 2305)  BP: (!) 80/0 (12/09/23 0800)  SpO2: 100 % (12/09/23 1021) Vital Signs (24h Range):  Temp:  [97.8 °F (36.6 °C)-99.3 °F (37.4 °C)] 99.3 °F (37.4 °C)  Pulse:  [75-88] 82  Resp:  [18-25] 18  SpO2:  [98 %-100 %] 100 %  BP: (68-85)/(0) 80/0  Arterial Line BP: (70-99)/(56-74) 79/61     Weight: 87.1 kg (192 lb)  Body mass index is 26.04 kg/m².    Estimated Creatinine Clearance: 106.4 mL/min (based on SCr of 0.8 mg/dL).     Physical Exam  Vitals and nursing note reviewed.   Cardiovascular:      Rate and Rhythm: Normal rate.      Comments: humm  Pulmonary:      Breath sounds: No wheezing or rhonchi.   Abdominal:      Tenderness: There is no guarding or rebound.   Neurological:      General: No focal deficit present.      Mental Status: He is alert.   Psychiatric:         Mood and Affect: Mood normal.         Behavior: Behavior normal.          Significant Labs: Blood Culture:   Recent Labs   Lab 11/28/23  1450 11/28/23  1452   LABBLOO No growth after 5 days. No growth after 5 days.     BMP:   Recent Labs   Lab 12/09/23  0323   *      K 4.2      CO2 24   BUN 8   CREATININE 0.8   CALCIUM 8.1*   MG 1.7     CBC:   Recent Labs   Lab 12/07/23  1206 12/07/23  1937 12/08/23  0408 12/09/23  0323   WBC 10.31  --  10.42 10.13   HGB 7.0*  --  6.3* 7.1*   HCT 20.6* 18* 19.0* 21.2*     --  246 231     Procalcitonin:   Recent Labs   Lab 12/08/23  0408 12/09/23  0323   PROCAL 10.63* 7.15*     Respiratory Culture:   Recent Labs   Lab 12/06/23  1531   GSRESP Moderate WBC's  Rare Gram negative rods   RESPIRATORYC No growth     Wound Culture: No results for input(s): "LABAERO" in the last 4320 hours.    Significant Imaging: I have reviewed all " pertinent imaging results/findings within the past 24 hours.

## 2023-12-09 NOTE — NURSING
Dr. Washington at bedside. Pt Map's in the 60's. Dobutamine weaned to 2.5, Heparin from 1300 to 600, and adding 5 Ketty. UF on CRRT at 250.

## 2023-12-09 NOTE — CARE UPDATE
-Glucose Goal 140-180    -A1C:   Hemoglobin A1C   Date Value Ref Range Status   10/12/2023 7.6 (H) 4.0 - 5.6 % Final     Comment:     ADA Screening Guidelines:  5.7-6.4%  Consistent with prediabetes  >or=6.5%  Consistent with diabetes    High levels of fetal hemoglobin interfere with the HbA1C  assay. Heterozygous hemoglobin variants (HbS, HgC, etc)do  not significantly interfere with this assay.   However, presence of multiple variants may affect accuracy.           -HOME REGIMEN: Metformin (off since October)     -GLUCOSE TREND FOR THE PAST 24HRS:   Recent Labs   Lab 12/08/23  1610 12/08/23  1916 12/09/23  0007 12/09/23  0009 12/09/23  0314 12/09/23  0814   POCTGLUCOSE 246* 194* 250* 221* 239* 200*         -NO HYPOGYCEMIAS NOTED     - Diet  Diet NPO Except for: Medication (per NG tube)    -Steroids - None     -Tube Feeds - 40 mL/hr         Plan:   - 5 units Novolog q 4 hr to cover TF   - MDC (150/50) prn q 4 hr   - POCT Glucose q 4 hr   - Hypoglycemia protocol in place      ** Please notify Endocrine for any change and/or advance in diet**  ** Please call Endocrine for any BG related issues **     Discharge Planning:   TBD. Please notify endocrinology prior to discharge.

## 2023-12-09 NOTE — PROGRESS NOTES
[facility] - [facility]  Heart Transplant  Progress Note    Patient Name: Radha Abbott  MRN: [id number]  Admission Date: 11/9/2023  Hospital Length of Stay: 30 days  Attending Physician: Yuri Washington MD  Primary Care Provider: Vasu Kong MD  Principal Problem:Acute on chronic combined systolic and diastolic CHF, NYHA class 4    Subjective:   Interval History: Patient POD #9 s/p HM3 LVAD implant. Extubated this AM following reintuabtion earlier this week for airway protection in setting of AMS. This AM more awake and responsive than reported to be previously.        Continuous Infusions:   sodium chloride 0.9% 5 mL/hr at 12/09/23 1200    calcium gluconate 3 g in dextrose 5 % (D5W) 100 mL infusion 25 mL/hr at 12/09/23 1200    dexmedeTOMIDine (Precedex) infusion (titrating) 1 mcg/kg/hr (12/09/23 1000)    dextrose 10 % in water (D10W)      dextrose-sod citrate-citric ac 150 mL/hr at 12/09/23 1200    DOBUTamine 5 mcg/kg/min (12/09/23 1200)    EPINEPHrine Stopped (12/09/23 1006)    heparin (porcine) in 5 % dex 1,300 Units/hr (12/09/23 1200)    nicardipine 5 mg/hr (12/09/23 0600)    propofoL Stopped (12/08/23 1945)    vasopressin 0.04 Units/min (12/09/23 1200)     Scheduled Meds:   amiodarone  400 mg Per NG tube Daily    atorvastatin  40 mg Per NG tube Daily    ferrous gluconate  324 mg Per NG tube Daily with breakfast    insulin aspart U-100  5 Units Subcutaneous 6 times per day    midodrine  5 mg Oral TID    pantoprazole  40 mg Intravenous BID    piperacillin-tazobactam (Zosyn) IV (PEDS and ADULTS) (extended infusion is not appropriate)  4.5 g Intravenous Q8H    polyethylene glycol  17 g Per NG tube BID    senna-docusate 8.6-50 mg  2 tablet Per NG tube BID    warfarin  2 mg Per NG tube Daily     PRN Meds:0.9%  NaCl infusion (for blood administration), acetaminophen, albumin human 5%, albuterol sulfate, bisacodyL, dextrose 10 % in water (D10W), dextrose 10%, dextrose 10%, glucagon (human  recombinant), insulin aspart U-100, magnesium hydroxide 400 mg/5 ml, potassium chloride, potassium chloride, QUEtiapine, Flushing PICC/Midline Protocol **AND** [DISCONTINUED] sodium chloride 0.9% **AND** sodium chloride 0.9%, sodium phosphate 39.99 mmol in dextrose 5 % (D5W) 250 mL IVPB    Review of patient's allergies indicates:  No Known Allergies  Objective:     Vital Signs (Most Recent):  Temp: 98 °F (36.7 °C) (12/09/23 1115)  Pulse: 92 (12/09/23 1206)  Resp: 18 (12/08/23 2305)  BP: (!) 78/0 (12/09/23 1200)  SpO2: 100 % (12/09/23 1021) Vital Signs (24h Range):  Temp:  [97.8 °F (36.6 °C)-99.3 °F (37.4 °C)] 98 °F (36.7 °C)  Pulse:  [75-92] 92  Resp:  [18-25] 18  SpO2:  [98 %-100 %] 100 %  BP: (68-85)/(0) 78/0  Arterial Line BP: (70-99)/(56-74) 76/57     Patient Vitals for the past 72 hrs (Last 3 readings):   Weight   12/08/23 1420 87.1 kg (192 lb)   12/08/23 0500 87.1 kg (192 lb)   12/07/23 0501 95 kg (209 lb 7 oz)       Body mass index is 26.04 kg/m².      Intake/Output Summary (Last 24 hours) at 12/9/2023 1314  Last data filed at 12/9/2023 1300  Gross per 24 hour   Intake 6660.6 ml   Output 9281 ml   Net -2620.4 ml         Hemodynamic Parameters:       Telemetry: NSR        Physical Exam  Constitutional:       Appearance: He is ill-appearing.      Comments: Intubated and sedated    HENT:      Head: Normocephalic and atraumatic.   Eyes:      Pupils: Pupils are equal, round, and reactive to light.   Cardiovascular:      Rate and Rhythm: Normal rate and regular rhythm.      Comments: LVAD hum  Pulmonary:      Breath sounds: Rhonchi and rales present.   Chest:      Comments: CT x1  Abdominal:      General: Abdomen is flat. Bowel sounds are normal. There is no distension.      Palpations: Abdomen is soft.   Musculoskeletal:         General: Normal range of motion.      Cervical back: Neck supple.   Skin:     General: Skin is warm and dry.   Neurological:      Comments: Awake. Follows basic commands. Tracks with his  eyes.             Significant Labs:  CBC:  Recent Labs   Lab 12/07/23  1206 12/07/23  1937 12/08/23  0408 12/09/23  0323   WBC 10.31  --  10.42 10.13   RBC 2.28*  --  2.16* 2.49*   HGB 7.0*  --  6.3* 7.1*   HCT 20.6* 18* 19.0* 21.2*     --  246 231   MCV 90  --  88 85   MCH 30.7  --  29.2 28.5   MCHC 34.0  --  33.2 33.5       BNP:  Recent Labs   Lab 12/04/23  0412 12/06/23  0410 12/08/23  0408   * 1,679* 695*       CMP:  Recent Labs   Lab 12/07/23  0417 12/07/23  0758 12/08/23  0408 12/08/23  1110 12/08/23  1450 12/08/23  1725 12/08/23  2136 12/09/23  0009 12/09/23  0323 12/09/23  1145   *   < > 209*  --  188*  --  170*  --  219*  --    CALCIUM 8.4*   < > 8.1*  --  7.8*  --  8.1*  --  8.1*  --    ALBUMIN 2.2*   < > 2.0*  2.0*  --  1.9*  --  1.9*  --  2.0*  --    PROT 6.1  --  5.7*  --   --   --   --   --  6.2  --       < > 140  --  142  --  142  --  141  --    K 5.1   < > 4.3   < > 4.1   < > 4.2 4.2 4.2 4.4   CO2 15*   < > 21*  --  28  --  26  --  24  --       < > 106  --  105  --  107  --  108  --    BUN 22   < > 13  --  10  --  7*  --  8  --    CREATININE 1.5*   < > 1.1  --  1.0  --  0.7  --  0.8  --    ALKPHOS 129  --  139*  --   --   --   --   --  147*  --    ALT 33  --  26  --   --   --   --   --  28  --    AST 79*  --  55*  --   --   --   --   --  44*  --    BILITOT 0.7  --  0.8  --   --   --   --   --  0.7  --     < > = values in this interval not displayed.        Coagulation:   Recent Labs   Lab 12/09/23  0009 12/09/23  0154 12/09/23  0323 12/09/23  0813 12/09/23  1145   INR 1.1  --  1.1  --  1.1   APTT  --  42.7*  --  54.0* 55.2*       LDH:  Recent Labs   Lab 12/07/23  0417 12/08/23  0408 12/09/23  0323   * 526* 574*       Microbiology:  Microbiology Results (last 7 days)       Procedure Component Value Units Date/Time    Culture, Respiratory [[id number]] Collected: 12/06/23 1531    Order Status: Completed Specimen: Respiratory from Bronchial Wash Updated: 12/08/23  1103     Respiratory Culture No growth     Gram Stain (Respiratory) Moderate WBC's     Gram Stain (Respiratory) Rare Gram negative rods    Narrative:      Bronchial Wash    AFB Culture & Smear [[id number]] Collected: 12/06/23 1532    Order Status: Completed Specimen: Body Fluid from Lung, Right Updated: 12/07/23 2127     AFB Culture & Smear Culture in progress     AFB CULTURE STAIN No acid fast bacilli seen.    Narrative:      Bronchial Wash    Direct AFB stain [[id number]] Collected: 12/06/23 1532    Order Status: Completed Specimen: Body Fluid from Lung, Right Updated: 12/06/23 2257     Direct Acid Fast No acid fast bacilli seen.    Narrative:      Bronchial Wash    Fungus culture [[id number]] Collected: 12/06/23 1532    Order Status: Sent Specimen: Body Fluid from Lung, Right Updated: 12/06/23 1719    Gram stain [[id number]] Collected: 12/06/23 1532    Order Status: Canceled Specimen: Body Fluid from Lung, Right     Blood culture [[id number]] Collected: 11/28/23 1452    Order Status: Completed Specimen: Blood from Peripheral, Upper Arm, Right Updated: 12/03/23 1612     Blood Culture, Routine No growth after 5 days.    Blood culture [[id number]] Collected: 11/28/23 1450    Order Status: Completed Specimen: Blood from Peripheral, Forearm, Right Updated: 12/03/23 1612     Blood Culture, Routine No growth after 5 days.            BMP:   Recent Labs   Lab 12/09/23  0323 12/09/23  1145   *  --      --    K 4.2 4.4     --    CO2 24  --    BUN 8  --    CREATININE 0.8  --    CALCIUM 8.1*  --    MG 1.7 2.2       I have reviewed all pertinent labs within the past 24 hours.    Estimated Creatinine Clearance: 106.4 mL/min (based on SCr of 0.8 mg/dL).    Diagnostic Results:  I have reviewed all pertinent imaging results/findings within the past 24 hours.  Assessment and Plan:     63 year old male with hx of CAD s/p 3v CABG (unclear anatomy, 2009), ICM with a recent EF of 15-20% s/p ICD (medtronik 2009), DM2  (a1c 7.7), HTN, HLD, Vfib on amio who presents to the ED with CC of SOB     Pt was recently admitted to [facility] as a transfer.  He was started on a Lasix gtt and did well.  Started on gDMT and discharged home on  5 with plans to follow up in [facility] clinic for ongoing transplant evaluation at another facility.  He says that about a few days ago he started to notice LE swelling.  This turned into Nelson and orthopnea.  He says he can't walk to the bathroom without getting SOB.  He also complains of weight gain.  He takes torsemide 20mg BID at home and was told to trial additional Lasix which he did without any improvement.  He was rx metolazone but has not been filled.  He came to the ED     In the ED he was AF with stable VS on RA.  CBC showing chronic anemia.  CMP notable for acute on chronic CKD with baseline around 2.1 and Cr now 2.6.  BNP elevated.  Lactate neg.  CXR showing pulmonary edema.  I evaluated the pt at the bedside.  Bedside TTE showing CVP >15.  He was subsequently admitted to the CCU for diuresis.     He was continued on his home  and was started on a lasix gtt, which he responded well to overnight (net -1700cc. He feels much better this morning as well. Our transplant coordinators have been working on insurance approval and he is now being transferred to [facility] service for transplant evaluation.     * Acute on chronic combined systolic and diastolic CHF, NYHA class 4  Pt with known ICM with an EF of 25% on home  presented with decompensated HF.  Not in cardiogenic shock    RHC 11/14: RA 14, PA 70/35, PCWP 32, CO 4.1, CI 2.1.   Repeat RHC 11/20 RA RA  20  PA 60/29 (39)  PCWP 29    CO  4.6 l/min  CI  2.3 l/min/m2  2D echo 11/21 showed EF 15-20%, mild RV dysfunction, mild MR, LVEDD 6.9     Repeat on 11/24 showed EF 10-15%, RV mildly reduced, mild-mod MR, LVEDD 6.42  - Home  gtt at 5  - home GDMT: entresto 24/26mg BID (on hold 2/2 IVÁN), Hydralazine 25 q8h - held prior to LVAD implant   - home diuretics:  Was taking lasix 40 bid  - Bridged to LVAD with IABP s/p HM3 implant 12/1        -PT/OT and nutrition   -s/p LVAD (12/1) Speed increased on 12/7 to 4900 RPMs  - Remains on  5, epi 0.01, Ketty off  - CTS primary  -f/u echo           Altered mental status  -multifactorial  -likely metabolic encephalopathy +/- icu delirium   -CTH 12/7 negative for acute process  -Continue RRT for metabolic clearance  -Wean sedation as able  -EEG in progress, no epileptic findings, just notable for generalized slowing  -provide frequent re-orientation  -Promote sleep/wake cycle   -Neurology consulted   - More awake this AM thus reextubated.     LVAD (left ventricular assist device) present  -HeartMate 3 Implanted  12/1/2023  as DT  -CTS Primary  -Implanted by Dr. Washington  -HOLD Coumadin,  Goal INR 2.0-3.0 . Subtherapeutic today. On Heparin gtt   -Antiplatelets Not on ASA  -LDH is stable overall today. Will continue to monitor daily.  -Speed set at  4900   rpm, LSL 4500 rpm   -Interrogation notable for no events  -Not listed for OHTx, declined for OHTX due to comorbidities         Procedure: Device Interrogation Including analysis of device parameters  Current Settings: Ventricular Assist Device  Review of device function is stable/unstable stable        12/9/2023    12:00 PM 12/9/2023    11:00 AM 12/9/2023     9:00 AM 12/9/2023     8:00 AM 12/9/2023     7:15 AM 12/9/2023     7:00 AM 12/9/2023     6:00 AM   TXP LVAD INTERROGATIONS   Type HeartMate3 HeartMate3 HeartMate3 HeartMate3 HeartMate3 HeartMate3 HeartMate3   Flow 4 4 5.2 3.9 3.6 3.9 3.8   Speed 4900 4950 3.2 4900 4950 5000 4900   PI 5.2 5.2 5 5.2 6.3 5.2 5.3   Power ([place]) 3.2 3.3 3.2 3.4 3.2 3.2 3.2   LSL 4500 4500 4500 4500 4500 4500 4300   Pulsatility Intermittent pulse Intermittent pulse Intermittent pulse Intermittent pulse            PAF (paroxysmal atrial fibrillation)  Known hx of pAF. In sinus rhythm on admission, but 1 run of AF RVR overnight. He spontaneously  converted.  - Continue home amiodarone 400mg qd  - Stop home Eliquis and continue heparin gtt while in the hospital.       Acute renal failure with acute tubular necrosis superimposed on stage 3b chronic kidney disease  IVÁN on CKD2. Baseline Cr of 1.7-2.0.   - Hold ARNi, entresto  -Trend BMPs daily   -SrCr 1.1/BUN 13 today  -SLED/SCUF for volume removal   -Nephrology following     Type 2 diabetes mellitus without complication, without long-term current use of insulin  -A1c 7.6, not insulin dependent  - MDSSI  -Currently on Insulin gtt   - Endocrine following, appreciate assistance with blood glucose management    CAD (coronary artery disease)  -S/p 3vCABG in 2009  - home ASA, HI statin- on hold after surgery     Ventricular tachycardia  Hx VT s/p ICD placement (medtronic 2009)  - Continue home amio 400mg qd    Uninterrupted Critical Care/Counseling Time (not including procedures): 60 min.       Jimy An PA-C  Heart Transplant  [facility] - [facility] Int[facility]

## 2023-12-09 NOTE — PLAN OF CARE
SICU PLAN OF CARE NOTE    Dx: Acute on chronic combined systolic and diastolic CHF, NYHA class 4    Goals of Care: MAP 70-80    Vital Signs (last 12 hours):   Temp:  [98.4 °F (36.9 °C)-99.3 °F (37.4 °C)]   Pulse:  [79-88]   Resp:  [20-25]   BP: (68-78)/(0)   SpO2:  [98 %-100 %]   Arterial Line BP: (70-99)/(56-74)      Neuro: Arouses to Voice and Moves All Extremities. Will move extremities on command intermittently      Cardiac:LVAD    Respiratory: Ventilator pt was on spontaneous mode all day    Gtts: Epinephrine , Vasopressin, Dobutamine, Precedex, Cardene, and Heparin    Urine Output: Anuric 0 cc/shift    Dialysis: SCUF, UF Rate: 300-600/hr  Goal to have pt negative 1 L in 24 hrs    Drains: chest tube removed    Diet: NPO and Tube Feeds    VAD Speed 4900 . See flowsheet for more details    Restraints education provided. Cap refill less than 3 seconds. No skin injuries noted     Labs/Accuchecks: q4 accuchecks and renal labs    Skin: no noted skin breakdown. Heels elevated off of bed. Pt repositioned q2 hrs.     Shift Events: Chest tube removed.  EEG placed on patient. PT placed on spontaneous on ventilator and remained there with no complications for the entire shift. PT restarted on cRRT following the machine clotting off. PT became hypotensive with Map 65-67, pt started on 0.02 vaso.  PT on 1.4 precedex and becoming very agitated. Spoke with Dr. Washington and neurology on the phone and received the okay to start propofol and place pt on ACVC.

## 2023-12-09 NOTE — PROGRESS NOTES
[facility] - Surgical Intensive Care  Infectious Disease  Progress Note    Patient Name: Radha Abbott  MRN: [id number]  Admission Date: 11/9/2023  Length of Stay: 30 days  Attending Physician: Yuri Washington MD  Primary Care Provider: Vasu Kong MD    Isolation Status: No active isolations    Assessment/Plan:      62 yo man s/p destination therapy left ventricular assist device placement. Postoperatively the chest was left open due to diffuse coagulopathy and RV dysfunction.  He did well post-operatively and underwent washout and closure on 12/4. The patient was extubated but had to be reintubated. A BAL was performed: Gram stain with GNB, culture is NGTD. PCT elevated at 10 improved to 7. Resp culture is NG.    Recommendations  Continue empiric Zosyn  Follow-up PCT in am (10 -> 7->?)  Discussed with family     Albert Burt MD  Infectious Disease  [facility] - Surgical Intensive Care    Subjective:     Principal Problem:Acute on chronic combined systolic and diastolic CHF, NYHA class 4    HPI:  Mr. Abbott is a 63-year-old man who underwent destination therapy left ventricular assist device placement.  Postoperatively the chest was left open due to diffuse coagulopathy in RV dysfunction.  He did well post-operatively and underwent washout and closure on 12/4. The patient was extubated but had to be reintubated. A BAL was performed: Gram stain with GNB, culture is NGTD. ID is consulted for BAL results. The patient is agitated but quiets down with reassurance. No fever or chills. Ecgo done this am - results pending. Family at bedside.     Interval History: Alert and nods to questions. Brother, Mumtaz, at bedside.     Review of Systems   Unable to perform ROS: Intubated     Objective:     Vital Signs (Most Recent):  Temp: 99.3 °F (37.4 °C) (12/09/23 0715)  Pulse: 82 (12/09/23 1010)  Resp: 18 (12/08/23 2305)  BP: (!) 80/0 (12/09/23 0800)  SpO2: 100 % (12/09/23 1021) Vital Signs (24h Range):  Temp:  [97.8 °F  "(36.6 °C)-99.3 °F (37.4 °C)] 99.3 °F (37.4 °C)  Pulse:  [75-88] 82  Resp:  [18-25] 18  SpO2:  [98 %-100 %] 100 %  BP: (68-85)/(0) 80/0  Arterial Line BP: (70-99)/(56-74) 79/61     Weight: 87.1 kg (192 lb)  Body mass index is 26.04 kg/m².    Estimated Creatinine Clearance: 106.4 mL/min (based on SCr of 0.8 mg/dL).     Physical Exam  Vitals and nursing note reviewed.   Cardiovascular:      Rate and Rhythm: Normal rate.      Comments: humm  Pulmonary:      Breath sounds: No wheezing or rhonchi.   Abdominal:      Tenderness: There is no guarding or rebound.   Neurological:      General: No focal deficit present.      Mental Status: He is alert.   Psychiatric:         Mood and Affect: Mood normal.         Behavior: Behavior normal.          Significant Labs: Blood Culture:   Recent Labs   Lab 11/28/23  1450 11/28/23  1452   LABBLOO No growth after 5 days. No growth after 5 days.     BMP:   Recent Labs   Lab 12/09/23  0323   *      K 4.2      CO2 24   BUN 8   CREATININE 0.8   CALCIUM 8.1*   MG 1.7     CBC:   Recent Labs   Lab 12/07/23  1206 12/07/23  1937 12/08/23  0408 12/09/23  0323   WBC 10.31  --  10.42 10.13   HGB 7.0*  --  6.3* 7.1*   HCT 20.6* 18* 19.0* 21.2*     --  246 231     Procalcitonin:   Recent Labs   Lab 12/08/23  0408 12/09/23  0323   PROCAL 10.63* 7.15*     Respiratory Culture:   Recent Labs   Lab 12/06/23  1531   GSRESP Moderate WBC's  Rare Gram negative rods   RESPIRATORYC No growth     Wound Culture: No results for input(s): "LABAERO" in the last 4320 hours.    Significant Imaging: I have reviewed all pertinent imaging results/findings within the past 24 hours.  "

## 2023-12-09 NOTE — PROCEDURES
[facility]     PRELIMINARY C-EEG REPORT:  Review: 12/8/2023, 10:24 (start) - 20:00     The study is done without sedation    Background of generalized slowing (sharply contoured delta-theta, 2-5 Hz activity) mixed with generalized sharp waves of triphasic morphology is seen, suggestive of severe diffuse cerebral dysfunction, likely of metabolic etiology.  No epileptiform activity or electrographic seizures seen.         Full report to follow.  Will continue to monitor.     Thank you for involving us in the care of this patient.    Magda Alan MD, RAFAT(), FACNS, FASHANTA.  Neurology-Epilepsy.  [facility].

## 2023-12-09 NOTE — PROGRESS NOTES
[facility]  Neurology  Progress Note    Patient Name: Radha Abbott  MRN: [id number]  Admission Date: 11/9/2023  Hospital Length of Stay: 30 days  Code Status: Full Code   Attending Provider: Yuri Washington MD  Primary Care Physician: Vasu Kong MD   Principal Problem:Acute on chronic combined systolic and diastolic CHF, NYHA class 4    HPI:   Neurology consulted for encephalopathy evaluation in Radha Abbott, a 63 y.o. male s/p recent LVAD placement for combined systolic/diastolic heart failure complicated by diffuse coagulopathy and RV dysfunction.     Overview/Hospital Course:  No notes on file        Subjective:     Interval History: EEG overnight completed and showed severe generalized slowing with triphasic waves seen throughout, suggestive of severe diffuse cerebral dysfunction, likely of metabolic etiology. No seizures noted.   This morning patient had been extubated prior to my evaluation and he was following commands and at his baseline.   On zosyn for pneumonia.     Current Neurological Medications: as detailed below     Current Facility-Administered Medications   Medication Dose Route Frequency Provider Last Rate Last Admin    0.9%  NaCl infusion (for blood administration)   Intravenous Q24H PRN Alexandria Chiu MD        0.9%  NaCl infusion   Intravenous Continuous Eric Brizuela MD 5 mL/hr at 12/09/23 1500 Rate Verify at 12/09/23 1500    acetaminophen tablet 650 mg  650 mg Per NG tube Q6H PRN Melanie Padilla MD   650 mg at 12/05/23 1958    albumin human 5% bottle 25 g  25 g Intravenous PRN Eric Brizuela MD        albuterol sulfate nebulizer solution 2.5 mg  2.5 mg Nebulization Q4H PRN Eric Brizulea MD        amiodarone tablet 400 mg  400 mg Per NG tube Daily Melanie Padilla MD   400 mg at 12/09/23 0849    atorvastatin tablet 40 mg  40 mg Per NG tube Daily Melanie Padilla MD   40 mg at 12/09/23 0849    bisacodyL suppository 10 mg  10 mg Rectal Daily PRN Eric Brizuela MD        dexmedetomidine  (PRECEDEX) 400mcg/100mL 0.9% NaCL infusion  0-1.4 mcg/kg/hr Intravenous Continuous Melanie Padilla MD 23.75 mL/hr at 12/09/23 1000 1 mcg/kg/hr at 12/09/23 1000    dextrose 10 % infusion   Intravenous Continuous PRN Ebony Carlson PA-C        dextrose 10% bolus 125 mL 125 mL  12.5 g Intravenous PRN Ebony Carlson PA-C        dextrose 10% bolus 250 mL 250 mL  25 g Intravenous PRN Ebony Carlson PA-C        DOBUtamine 1000 mg in D5W 250 mL infusion  5 mcg/kg/min Intravenous Continuous Eric Brizuela MD 5.8 mL/hr at 12/09/23 1500 5 mcg/kg/min at 12/09/23 1500    EPINEPHrine 5 mg in dextrose 5% 250 mL infusion (premix)  0-2 mcg/kg/min Intravenous Continuous Melanie Padilla MD        ferrous gluconate tablet 324 mg  324 mg Per NG tube Daily with breakfast Melanie Padilla MD   324 mg at 12/09/23 0830    glucagon (human recombinant) injection 1 mg  1 mg Intramuscular PRN Ebony Carlson PA-C        heparin 25,000 units in dextrose 5% 250 mL (100 units/mL) infusion (heparin infusion - NO NOMOGRAM)  1,300 Units/hr Intravenous Continuous Piper Salvador MD 13 mL/hr at 12/09/23 1500 1,300 Units/hr at 12/09/23 1500    insulin aspart U-100 pen 0-10 Units  0-10 Units Subcutaneous Q4H PRN Ebony Carlson PA-C   2 Units at 12/09/23 1552    insulin aspart U-100 pen 5 Units  5 Units Subcutaneous 6 times per day Ebony Carlson PA-C   5 Units at 12/09/23 1552    magnesium hydroxide 400 mg/5 ml suspension 2,400 mg  30 mL Oral Daily PRN Eric Brizuela MD        midodrine tablet 5 mg  5 mg Oral TID Ashanti Pete MD   5 mg at 12/09/23 1340    niCARdipine 40 mg/200 mL (0.2 mg/mL) infusion  0-15 mg/hr Intravenous Continuous Alexandria Chiu MD 25 mL/hr at 12/09/23 0600 5 mg/hr at 12/09/23 0600    pantoprazole injection 40 mg  40 mg Intravenous BID Melanie Padilla MD   40 mg at 12/09/23 0849    piperacillin-tazobactam (ZOSYN) 4.5 g in dextrose 5 % in water (D5W) 100 mL IVPB (MB+)  4.5 g Intravenous Q8H Albert Burt MD 25 mL/hr  at 12/09/23 1542 4.5 g at 12/09/23 1542    polyethylene glycol packet 17 g  17 g Per NG tube BID Melanie Padilla MD   17 g at 12/09/23 0849    potassium chloride 20 mEq in 100 mL IVPB (FOR CENTRAL LINE ADMINISTRATION ONLY)  40 mEq Intravenous PRN Eric Brizuela MD        potassium chloride 20 mEq in 100 mL IVPB (FOR CENTRAL LINE ADMINISTRATION ONLY)  40 mEq Intravenous PRN Eric Brizuela MD        QUEtiapine tablet 50 mg  50 mg Per NG tube Q6H PRN Yuri Washington MD        senna-docusate 8.6-50 mg per tablet 2 tablet  2 tablet Per NG tube BID Melanie Padilla MD   2 tablet at 12/09/23 0849    sodium chloride 0.9% flush 10 mL  10 mL Intravenous PRN Yuri Washington MD        vasopressin (PITRESSIN) 0.2 Units/mL in dextrose 5 % (D5W) 100 mL infusion  0.04 Units/min Intravenous Continuous Yuri Washington MD 12 mL/hr at 12/09/23 1500 0.04 Units/min at 12/09/23 1500    warfarin (COUMADIN) tablet 2 mg  2 mg Per NG tube Daily Josr Morales MD   2 mg at 12/08/23 1724       Review of Systems   Constitutional:  Negative for appetite change, chills and fever.   HENT:  Negative for congestion, sore throat, trouble swallowing and voice change.    Eyes: Negative.    Respiratory:  Negative for cough and shortness of breath.    Cardiovascular:  Negative for chest pain and leg swelling.   Gastrointestinal:  Negative for abdominal distention, abdominal pain, constipation, nausea and vomiting.   Endocrine: Negative.    Genitourinary: Negative.    Musculoskeletal:  Negative for back pain and gait problem.   Skin: Negative.    Neurological:  Negative for weakness and headaches.   Psychiatric/Behavioral: Negative.       Objective:     Vital Signs (Most Recent):  Temp: 97.6 °F (36.4 °C) (12/09/23 1530)  Pulse: 108 (12/09/23 1545)  Resp: 18 (12/08/23 2305)  BP: (!) 78/0 (12/09/23 1200)  SpO2: 100 % (12/09/23 1530) Vital Signs (24h Range):  Temp:  [97.6 °F (36.4 °C)-99.3 °F (37.4 °C)] 97.6 °F (36.4 °C)  Pulse:  [] 108  Resp:  [18] 18  SpO2:  [98  %-100 %] 100 %  BP: (78-85)/(0) 78/0  Arterial Line BP: (72-99)/(56-74) 80/56     Weight: 87.1 kg (192 lb)  Body mass index is 26.04 kg/m².     Physical Exam  Constitutional:       Appearance: Normal appearance.   HENT:      Head: Normocephalic and atraumatic.      Nose: Nose normal.      Comments: Ngtube in place     Mouth/Throat:      Mouth: Mucous membranes are moist.   Eyes:      Extraocular Movements: Extraocular movements intact.      Pupils: Pupils are equal, round, and reactive to light.   Cardiovascular:      Rate and Rhythm: Normal rate and regular rhythm.   Pulmonary:      Effort: No respiratory distress.   Abdominal:      General: Bowel sounds are normal. There is no distension.      Palpations: Abdomen is soft.      Tenderness: There is no abdominal tenderness.   Musculoskeletal:         General: No swelling. Normal range of motion.      Cervical back: Normal range of motion.   Skin:     General: Skin is warm.      Capillary Refill: Capillary refill takes less than 2 seconds.   Neurological:      General: No focal deficit present.      Mental Status: He is alert. Mental status is at baseline.      Cranial Nerves: Cranial nerves 2-12 are intact.      Motor: Motor strength is normal.     Coordination: Finger-Nose-Finger Test normal.      Deep Tendon Reflexes:      Reflex Scores:       Tricep reflexes are 2+ on the right side and 2+ on the left side.       Bicep reflexes are 2+ on the right side and 2+ on the left side.  Psychiatric:         Speech: Speech normal.          NEUROLOGICAL EXAMINATION:     MENTAL STATUS   Oriented to person.   Oriented to place.   Disoriented to time.   Follows 3 step commands.   Attention: normal. Concentration: normal.   Speech: speech is normal   Level of consciousness: alert    CRANIAL NERVES   Cranial nerves II through XII intact.     CN III, IV, VI   Pupils are equal, round, and reactive to light.    MOTOR EXAM   Muscle bulk: normal  Right arm pronator drift:  absent  Left arm pronator drift: absent    Strength   Strength 5/5 throughout.     REFLEXES     Reflexes   Right biceps: 2+  Left biceps: 2+  Right triceps: 2+  Left triceps: 2+    SENSORY EXAM   Light touch normal.     GAIT AND COORDINATION      Coordination   Finger to nose coordination: normal      Significant Labs: All pertinent lab results from the past 24 hours have been reviewed.    Significant Imaging: I have reviewed and interpreted all pertinent imaging results/findings within the past 24 hours.  Assessment and Plan:     Acute encephalopathy  63 year old gentleman s/p LVAD placement with persistent post operative encephalopathy and neurology consulted after patient had to be re intubated. He had CT head unremarkable for ischemia.   Lab work with improving CBC and renal function. He had elevated procal and BALs from bronch showing GNRs and ID consulted and started zosyn.   EEG 12/8: showed severe generalized slowing with triphasic waves seen throughout, suggestive of severe diffuse cerebral dysfunction, likely of metabolic etiology.      Patient extubated this morning, off sedation and no focal deficits. No focal neurological deficits. At baseline per family at bedside. Believe multifactorial etiology to worsening mentation, metabolic, infections and volume changes from LVAD.     Recommendations   -- Okay to d.c EEG, told EEG tech and will message epileptologist   -- no further diagnostics   -- avoid oversedating medications       Thank you for the consult. Neurology will sign off at this time. Please call or consult for any questions or concerns.       VTE Risk Mitigation (From admission, onward)           Ordered     warfarin (COUMADIN) tablet 2 mg  Daily         12/06/23 0926     heparin 25,000 units in dextrose 5% 250 mL (100 units/mL) infusion (heparin infusion - NO NOMOGRAM)  Continuous         12/06/23 0926     Reason for no Mechanical VTE Prophylaxis  Once        Question:  Reasons:  Answer:   Physician Provided (leave comment)    12/02/23 0032     IP VTE HIGH RISK PATIENT  Once         12/01/23 1552     Place sequential compression device  Until discontinued         12/01/23 1552                    Yudy Lantigua MD  Neurology  [facility]

## 2023-12-09 NOTE — NURSING
Pt restless in bed and NG tubed pulled out past marking. Tube feeds held. Stat KUB and chest xray ordered.

## 2023-12-09 NOTE — PROGRESS NOTES
[facility]  Neurology    Patient's chart was reviewed by a Neurology team provider.    Pending diagnostics to follow up on include: EEG    There are no new recommendations at this time.  For other recommendations please see our previous note completed on: 12/07/2023.  Discussed patient with staff.   Neurology will follow-up with patient. Please contact Neurology for any questions or concerns.       Philip Morales MD  Neurology  [facility] [place], [place]

## 2023-12-09 NOTE — SUBJECTIVE & OBJECTIVE
Subjective:     Interval History: EEG overnight completed and showed severe generalized slowing with triphasic waves seen throughout, suggestive of severe diffuse cerebral dysfunction, likely of metabolic etiology. No seizures noted.   This morning patient had been extubated prior to my evaluation and he was following commands and at his baseline.   On zosyn for pneumonia.     Current Neurological Medications: as detailed below     Current Facility-Administered Medications   Medication Dose Route Frequency Provider Last Rate Last Admin    0.9%  NaCl infusion (for blood administration)   Intravenous Q24H PRN Alexandria Chiu MD        0.9%  NaCl infusion   Intravenous Continuous Eric Brizuela MD 5 mL/hr at 12/09/23 1500 Rate Verify at 12/09/23 1500    acetaminophen tablet 650 mg  650 mg Per NG tube Q6H PRN Melanie Padilla MD   650 mg at 12/05/23 1958    albumin human 5% bottle 25 g  25 g Intravenous PRN Eric Brizuela MD        albuterol sulfate nebulizer solution 2.5 mg  2.5 mg Nebulization Q4H PRN Eric Brizuela MD        amiodarone tablet 400 mg  400 mg Per NG tube Daily Melanie Padilla MD   400 mg at 12/09/23 0849    atorvastatin tablet 40 mg  40 mg Per NG tube Daily Melanie Padilla MD   40 mg at 12/09/23 0849    bisacodyL suppository 10 mg  10 mg Rectal Daily PRN Eric Brizuela MD        dexmedetomidine (PRECEDEX) 400mcg/100mL 0.9% NaCL infusion  0-1.4 mcg/kg/hr Intravenous Continuous Melanie Padilla MD 23.75 mL/hr at 12/09/23 1000 1 mcg/kg/hr at 12/09/23 1000    dextrose 10 % infusion   Intravenous Continuous PRN Ebony Carlson PA-C        dextrose 10% bolus 125 mL 125 mL  12.5 g Intravenous PRN Ebony Carlson PA-C        dextrose 10% bolus 250 mL 250 mL  25 g Intravenous PRN Ebony Carlson PA-C        DOBUtamine 1000 mg in D5W 250 mL infusion  5 mcg/kg/min Intravenous Continuous Eric Brizuela MD 5.8 mL/hr at 12/09/23 1500 5 mcg/kg/min at 12/09/23 1500    EPINEPHrine 5 mg in dextrose 5% 250 mL infusion (premix)  0-2 mcg/kg/min  Intravenous Continuous Melanie Padilla MD        ferrous gluconate tablet 324 mg  324 mg Per NG tube Daily with breakfast Melanie Padilla MD   324 mg at 12/09/23 0830    glucagon (human recombinant) injection 1 mg  1 mg Intramuscular PRN Ebony Carlson PA-C        heparin 25,000 units in dextrose 5% 250 mL (100 units/mL) infusion (heparin infusion - NO NOMOGRAM)  1,300 Units/hr Intravenous Continuous Piper Salvador MD 13 mL/hr at 12/09/23 1500 1,300 Units/hr at 12/09/23 1500    insulin aspart U-100 pen 0-10 Units  0-10 Units Subcutaneous Q4H PRN Ebony Carlson PA-C   2 Units at 12/09/23 1552    insulin aspart U-100 pen 5 Units  5 Units Subcutaneous 6 times per day Ebony Carlson PA-C   5 Units at 12/09/23 1552    magnesium hydroxide 400 mg/5 ml suspension 2,400 mg  30 mL Oral Daily PRN Eric Brizuela MD        midodrine tablet 5 mg  5 mg Oral TID Ashanti Pete MD   5 mg at 12/09/23 1340    niCARdipine 40 mg/200 mL (0.2 mg/mL) infusion  0-15 mg/hr Intravenous Continuous Alexandria Chiu MD 25 mL/hr at 12/09/23 0600 5 mg/hr at 12/09/23 0600    pantoprazole injection 40 mg  40 mg Intravenous BID Melanie Padilla MD   40 mg at 12/09/23 0849    piperacillin-tazobactam (ZOSYN) 4.5 g in dextrose 5 % in water (D5W) 100 mL IVPB (MB+)  4.5 g Intravenous Q8H Albert Burt MD 25 mL/hr at 12/09/23 1542 4.5 g at 12/09/23 1542    polyethylene glycol packet 17 g  17 g Per NG tube BID Melanie Padilla MD   17 g at 12/09/23 0849    potassium chloride 20 mEq in 100 mL IVPB (FOR CENTRAL LINE ADMINISTRATION ONLY)  40 mEq Intravenous PRN Eric Brizuela MD        potassium chloride 20 mEq in 100 mL IVPB (FOR CENTRAL LINE ADMINISTRATION ONLY)  40 mEq Intravenous PRN Eric Brizuela MD        QUEtiapine tablet 50 mg  50 mg Per NG tube Q6H PRN Yuri Washington MD        senna-docusate 8.6-50 mg per tablet 2 tablet  2 tablet Per NG tube BID Melanie Padilla MD   2 tablet at 12/09/23 0849    sodium chloride 0.9% flush 10 mL  10 mL Intravenous  PRN Yuri Washington MD        vasopressin (PITRESSIN) 0.2 Units/mL in dextrose 5 % (D5W) 100 mL infusion  0.04 Units/min Intravenous Continuous Yuri Washington MD 12 mL/hr at 12/09/23 1500 0.04 Units/min at 12/09/23 1500    warfarin (COUMADIN) tablet 2 mg  2 mg Per NG tube Daily Josr Morales MD   2 mg at 12/08/23 1724       Review of Systems   Constitutional:  Negative for appetite change, chills and fever.   HENT:  Negative for congestion, sore throat, trouble swallowing and voice change.    Eyes: Negative.    Respiratory:  Negative for cough and shortness of breath.    Cardiovascular:  Negative for chest pain and leg swelling.   Gastrointestinal:  Negative for abdominal distention, abdominal pain, constipation, nausea and vomiting.   Endocrine: Negative.    Genitourinary: Negative.    Musculoskeletal:  Negative for back pain and gait problem.   Skin: Negative.    Neurological:  Negative for weakness and headaches.   Psychiatric/Behavioral: Negative.       Objective:     Vital Signs (Most Recent):  Temp: 97.6 °F (36.4 °C) (12/09/23 1530)  Pulse: 108 (12/09/23 1545)  Resp: 18 (12/08/23 2305)  BP: (!) 78/0 (12/09/23 1200)  SpO2: 100 % (12/09/23 1530) Vital Signs (24h Range):  Temp:  [97.6 °F (36.4 °C)-99.3 °F (37.4 °C)] 97.6 °F (36.4 °C)  Pulse:  [] 108  Resp:  [18] 18  SpO2:  [98 %-100 %] 100 %  BP: (78-85)/(0) 78/0  Arterial Line BP: (72-99)/(56-74) 80/56     Weight: 87.1 kg (192 lb)  Body mass index is 26.04 kg/m².     Physical Exam  Constitutional:       Appearance: Normal appearance.   HENT:      Head: Normocephalic and atraumatic.      Nose: Nose normal.      Comments: Ngtube in place     Mouth/Throat:      Mouth: Mucous membranes are moist.   Eyes:      Extraocular Movements: Extraocular movements intact.      Pupils: Pupils are equal, round, and reactive to light.   Cardiovascular:      Rate and Rhythm: Normal rate and regular rhythm.   Pulmonary:      Effort: No respiratory distress.   Abdominal:       General: Bowel sounds are normal. There is no distension.      Palpations: Abdomen is soft.      Tenderness: There is no abdominal tenderness.   Musculoskeletal:         General: No swelling. Normal range of motion.      Cervical back: Normal range of motion.   Skin:     General: Skin is warm.      Capillary Refill: Capillary refill takes less than 2 seconds.   Neurological:      General: No focal deficit present.      Mental Status: He is alert. Mental status is at baseline.      Cranial Nerves: Cranial nerves 2-12 are intact.      Motor: Motor strength is normal.     Coordination: Finger-Nose-Finger Test normal.      Deep Tendon Reflexes:      Reflex Scores:       Tricep reflexes are 2+ on the right side and 2+ on the left side.       Bicep reflexes are 2+ on the right side and 2+ on the left side.  Psychiatric:         Speech: Speech normal.          NEUROLOGICAL EXAMINATION:     MENTAL STATUS   Oriented to person.   Oriented to place.   Disoriented to time.   Follows 3 step commands.   Attention: normal. Concentration: normal.   Speech: speech is normal   Level of consciousness: alert    CRANIAL NERVES   Cranial nerves II through XII intact.     CN III, IV, VI   Pupils are equal, round, and reactive to light.    MOTOR EXAM   Muscle bulk: normal  Right arm pronator drift: absent  Left arm pronator drift: absent    Strength   Strength 5/5 throughout.     REFLEXES     Reflexes   Right biceps: 2+  Left biceps: 2+  Right triceps: 2+  Left triceps: 2+    SENSORY EXAM   Light touch normal.     GAIT AND COORDINATION      Coordination   Finger to nose coordination: normal      Significant Labs: All pertinent lab results from the past 24 hours have been reviewed.    Significant Imaging: I have reviewed and interpreted all pertinent imaging results/findings within the past 24 hours.

## 2023-12-09 NOTE — PROGRESS NOTES
12/09/23 0117   Treatment   Treatment Type SLED   Treatment Status Daily equipment check   Dialysis Machine Number [id number]   Dialyzer Time (hours) 27.32   BVP (Liters) 102.2 L   Solutions Labeled and Current  Yes   Access Temporary Cath;Left;IJ   Catheter Dressing Intact  Yes   Alarms Engaged Yes   CRRT Comments daily check done   $ CRRT Charges   $ CRRT Daily Assessment Complete   $ CRRT Daily Maintenance Complete     Ongoing continuous SLED. Orders and machine settings verified. Daily equipment check and maintenance done.

## 2023-12-09 NOTE — ASSESSMENT & PLAN NOTE
62 yo man s/p destination therapy left ventricular assist device placement. Postoperatively the chest was left open due to diffuse coagulopathy and RV dysfunction.  He did well post-operatively and underwent washout and closure on 12/4. The patient was extubated but had to be reintubated. A BAL was performed: Gram stain with GNB, culture is NGTD. PCT elevated at 10 improved to 7. Resp culture is NG.    Recommendations  Continue empiric Zosyn  Follow-up PCT in am (10 -> 7->?)  Discussed with family

## 2023-12-09 NOTE — PROCEDURES
ICU EEG/VIDEO MONITORING REPORT    Radha Abbott  [id number]  1960    DATE OF SERVICE: 12/8-9/2023    DATE OF ADMISSION: 11/9/2023  6:24 AM    ADMITTING PROVIDER: Dmitry Mayo MD    METHODOLOGY   Electroencephalographic (EEG) recording is with electrodes placed according to the International 10-20 placement system.  Thirty two (32) channels of digital signal are simultaneously recorded from the scalp and may include EKG, EMG, and/or eye monitors.   Recording band pass was 0.1 to 512 hz.  Digital video recording of the patient is simultaneously recorded with the EEG.  The nursing staff report clinical symptoms and may press an event button when the patient has symptoms of clinical interest to the treating physicians.  EEG and video recording is stored and archived in digital format.  The entire recording is visually reviewed and the times identified by computer analysis as being spikes or seizures are reviewed again.  Activation procedures which include photic stimulation, hyperventilation and instructing patients to perform simple task are done in selected patients.   Compresses spectral analysis (CSA) is also performed on the activity recorded from each individual channel.  This is displayed as a power display of frequencies from 0 to 30 Hz over time.   The CSA analysis is done and displayed continuously.  This is reviewed for asymmetries in power between homologous areas of the scalp and for presence of changes in power which canbe seen when seizures occur.  Sections of suspected abnormalities on the CSA is then compared with the original EEG recording.     phorus software was also utilized in the review of this study.  This software suite analyzes the EEG recording in multiple domains.  Coherence and rhythmicity is computed to identify EEG sections which may contain organized seizures.  Each channel undergoes analysis to detect presence of spike and sharp waves which have special and morphological  characteristic of epileptic activity.  The routine EEG recording is converted from spacial into frequency domain.  This is then displayed comparing homologous areas to identify areas of significant asymmetry.  Algorithm to identify non-cortically generated artifact is used to separate eye movement, EMG and other artifact from the EEG.      Recording Times  Start on 12/8/2023, 10:24  Stop on 12/9/2023, 14:44    A total of 28 hours and 20 minutes of EEG was recorded.    EEG FINDINGS - the study is done without sedation  Background activity:   The background rhythm was characterized by (sharply contoured delta-theta, 2-5 Hz activity   No posterior dominant rhythm seen.   Symmetry and continuity: the background was continuous and symmetric    Sleep:   Poorly developed sleep transients are seen.    Activation procedures:   Not done    Abnormal activity:   No epileptiform discharges, periodic discharges, lateralized rhythmic delta activity or electrographic seizures were seen.    Generalized sharp waves of triphasic morphology is seen throughout the study, but these are seen less frequently towards the end of the study.    EKG:   Irregular rhythm seen.    IMPRESSION:   This C-EEG done without sedation is abnormal due to the severe generalized slowing with triphasic waves seen throughout, suggestive of severe diffuse cerebral dysfunction, likely of metabolic etiology.   No epileptiform activity or electrographic seizures seen.  Irregular heart rhythm noted on EKG.    CLINICAL CORRELATION IS RECOMMENDED.    Magda Alan MD, RAFAT(HC), JOE BRITO.  Neurology-Epilepsy.  [facility].

## 2023-12-09 NOTE — ASSESSMENT & PLAN NOTE
Pt with known ICM with an EF of 25% on home  presented with decompensated HF.  Not in cardiogenic shock    RHC 11/14: RA 14, PA 70/35, PCWP 32, CO 4.1, CI 2.1.   Repeat RHC 11/20 RA RA  20  PA 60/29 (39)  PCWP 29    CO  4.6 l/min  CI  2.3 l/min/m2  2D echo 11/21 showed EF 15-20%, mild RV dysfunction, mild MR, LVEDD 6.9     Repeat on 11/24 showed EF 10-15%, RV mildly reduced, mild-mod MR, LVEDD 6.42  - Home  gtt at 5  - home GDMT: entresto 24/26mg BID (on hold 2/2 IVÁN), Hydralazine 25 q8h - held prior to LVAD implant   - home diuretics: Was taking lasix 40 bid  - Bridged to LVAD with IABP s/p HM3 implant 12/1        -PT/OT and nutrition   -s/p LVAD (12/1) Speed increased on 12/7 to 4900 RPMs  - Remains on  5, epi 0.01, Ketty off  - CTS primary  -f/u echo

## 2023-12-09 NOTE — ASSESSMENT & PLAN NOTE
-HeartMate 3 Implanted  12/1/2023  as DT  -CTS Primary  -Implanted by Dr. Washington  -HOLD Coumadin,  Goal INR 2.0-3.0 . Subtherapeutic today. On Heparin gtt   -Antiplatelets Not on ASA  -LDH is stable overall today. Will continue to monitor daily.  -Speed set at  4900   rpm, LSL 4500 rpm   -Interrogation notable for no events  -Not listed for OHTx, declined for OHTX due to comorbidities         Procedure: Device Interrogation Including analysis of device parameters  Current Settings: Ventricular Assist Device  Review of device function is stable/unstable stable        12/9/2023    12:00 PM 12/9/2023    11:00 AM 12/9/2023     9:00 AM 12/9/2023     8:00 AM 12/9/2023     7:15 AM 12/9/2023     7:00 AM 12/9/2023     6:00 AM   TXP LVAD INTERROGATIONS   Type HeartMate3 HeartMate3 HeartMate3 HeartMate3 HeartMate3 HeartMate3 HeartMate3   Flow 4 4 5.2 3.9 3.6 3.9 3.8   Speed 4900 4950 3.2 4900 4950 5000 4900   PI 5.2 5.2 5 5.2 6.3 5.2 5.3   Power ([place]) 3.2 3.3 3.2 3.4 3.2 3.2 3.2   LSL 4500 4500 4500 4500 4500 4500 4300   Pulsatility Intermittent pulse Intermittent pulse Intermittent pulse Intermittent pulse

## 2023-12-09 NOTE — NURSING
Parameters obtained per MD for extubation. Pt extubated to 5 L Nasal Cannula. MD, RN and RT at bedside. Pt vss and in no  distress. wctm

## 2023-12-09 NOTE — PT/OT/SLP PROGRESS
Physical Therapy Missed Treatment Note      Patient Name:  Radha Abbott   MRN:  [id number]  Admitting Diagnosis:  Acute on chronic combined systolic and diastolic CHF, NYHA class 4   Recent Surgery: Procedure(s) (LRB):  CLOSURE, WOUND, STERNUM (N/A)  INSERTION, GRAFT, PERICARDIUM  DRAINAGE, PLEURAL EFFUSION 5 Days Post-Op  Admit Date: 11/9/2023  Length of Stay: 30 days    Patient not seen today due to pt remains intubated, on SLED with pressor support, continuous EEG. Pt s/p VAD implant 12/1 and chest closure 12/4. Remains an eval.     Elizabeth Cee, PT, DPT  12/9/2023  Pager: [phone]

## 2023-12-09 NOTE — PROGRESS NOTES
12/09/2023  [place]    Current provider:  Yuri Washington MD    Device interrogation:      12/9/2023     9:00 AM 12/9/2023     8:00 AM 12/9/2023     7:15 AM 12/9/2023     7:00 AM 12/9/2023     6:00 AM 12/9/2023     5:00 AM 12/9/2023     4:00 AM   TXP LVAD INTERROGATIONS   Type HeartMate3 HeartMate3 HeartMate3 HeartMate3 HeartMate3 HeartMate3 HeartMate3   Flow 5.2 3.9 3.6 3.9 3.8 3.6 3.9   Speed 3.2 4900 4950 5000 4900 4900 4900   PI 5 5.2 6.3 5.2 5.3 6 3.9   Power ([place]) 3.2 3.4 3.2 3.2 3.2 3.2 3.2   LSL 4500 4500 4500 4500 4300 4300 4300   Pulsatility Intermittent pulse Intermittent pulse               Rounded on [facility] to ensure all mechanical assist device settings (IABP or VAD) were appropriate and all parameters were within limits.  I was able to ensure all back up equipment was present, the staff had no issues, and the Perfusion Department daily rounding was complete.      For implantable VADs: Interrogation of Ventricular assist device was performed with analysis of device parameters and review of device function. I have personally reviewed the interrogation findings and agree with findings as stated.     In emergency, the nursing units have been notified to contact the perfusion department either by:  Calling [phone] from 630am to 4pm Mon thru Fri, utilizing the On-Call Finder functionality of Epic and searching for Perfusion, or by contacting the hospital  from 4pm to 630am and on weekends and asking to speak with the perfusionist on call.    10:26 AM

## 2023-12-09 NOTE — SUBJECTIVE & OBJECTIVE
Interval History: Patient POD #9 s/p HM3 LVAD implant. Extubated this AM following reintuabtion earlier this week for airway protection in setting of AMS. This AM more awake and responsive than reported to be previously.        Continuous Infusions:   sodium chloride 0.9% 5 mL/hr at 12/09/23 1200    calcium gluconate 3 g in dextrose 5 % (D5W) 100 mL infusion 25 mL/hr at 12/09/23 1200    dexmedeTOMIDine (Precedex) infusion (titrating) 1 mcg/kg/hr (12/09/23 1000)    dextrose 10 % in water (D10W)      dextrose-sod citrate-citric ac 150 mL/hr at 12/09/23 1200    DOBUTamine 5 mcg/kg/min (12/09/23 1200)    EPINEPHrine Stopped (12/09/23 1006)    heparin (porcine) in 5 % dex 1,300 Units/hr (12/09/23 1200)    nicardipine 5 mg/hr (12/09/23 0600)    propofoL Stopped (12/08/23 1945)    vasopressin 0.04 Units/min (12/09/23 1200)     Scheduled Meds:   amiodarone  400 mg Per NG tube Daily    atorvastatin  40 mg Per NG tube Daily    ferrous gluconate  324 mg Per NG tube Daily with breakfast    insulin aspart U-100  5 Units Subcutaneous 6 times per day    midodrine  5 mg Oral TID    pantoprazole  40 mg Intravenous BID    piperacillin-tazobactam (Zosyn) IV (PEDS and ADULTS) (extended infusion is not appropriate)  4.5 g Intravenous Q8H    polyethylene glycol  17 g Per NG tube BID    senna-docusate 8.6-50 mg  2 tablet Per NG tube BID    warfarin  2 mg Per NG tube Daily     PRN Meds:0.9%  NaCl infusion (for blood administration), acetaminophen, albumin human 5%, albuterol sulfate, bisacodyL, dextrose 10 % in water (D10W), dextrose 10%, dextrose 10%, glucagon (human recombinant), insulin aspart U-100, magnesium hydroxide 400 mg/5 ml, potassium chloride, potassium chloride, QUEtiapine, Flushing PICC/Midline Protocol **AND** [DISCONTINUED] sodium chloride 0.9% **AND** sodium chloride 0.9%, sodium phosphate 39.99 mmol in dextrose 5 % (D5W) 250 mL IVPB    Review of patient's allergies indicates:  No Known Allergies  Objective:     Vital  Signs (Most Recent):  Temp: 98 °F (36.7 °C) (12/09/23 1115)  Pulse: 92 (12/09/23 1206)  Resp: 18 (12/08/23 2305)  BP: (!) 78/0 (12/09/23 1200)  SpO2: 100 % (12/09/23 1021) Vital Signs (24h Range):  Temp:  [97.8 °F (36.6 °C)-99.3 °F (37.4 °C)] 98 °F (36.7 °C)  Pulse:  [75-92] 92  Resp:  [18-25] 18  SpO2:  [98 %-100 %] 100 %  BP: (68-85)/(0) 78/0  Arterial Line BP: (70-99)/(56-74) 76/57     Patient Vitals for the past 72 hrs (Last 3 readings):   Weight   12/08/23 1420 87.1 kg (192 lb)   12/08/23 0500 87.1 kg (192 lb)   12/07/23 0501 95 kg (209 lb 7 oz)       Body mass index is 26.04 kg/m².      Intake/Output Summary (Last 24 hours) at 12/9/2023 1314  Last data filed at 12/9/2023 1300  Gross per 24 hour   Intake 6660.6 ml   Output 9281 ml   Net -2620.4 ml         Hemodynamic Parameters:       Telemetry: NSR        Physical Exam  Constitutional:       Appearance: He is ill-appearing.      Comments: Intubated and sedated    HENT:      Head: Normocephalic and atraumatic.   Eyes:      Pupils: Pupils are equal, round, and reactive to light.   Cardiovascular:      Rate and Rhythm: Normal rate and regular rhythm.      Comments: LVAD hum  Pulmonary:      Breath sounds: Rhonchi and rales present.   Chest:      Comments: CT x1  Abdominal:      General: Abdomen is flat. Bowel sounds are normal. There is no distension.      Palpations: Abdomen is soft.   Musculoskeletal:         General: Normal range of motion.      Cervical back: Neck supple.   Skin:     General: Skin is warm and dry.   Neurological:      Comments: Awake. Follows basic commands. Tracks with his eyes.             Significant Labs:  CBC:  Recent Labs   Lab 12/07/23  1206 12/07/23  1937 12/08/23  0408 12/09/23  0323   WBC 10.31  --  10.42 10.13   RBC 2.28*  --  2.16* 2.49*   HGB 7.0*  --  6.3* 7.1*   HCT 20.6* 18* 19.0* 21.2*     --  246 231   MCV 90  --  88 85   MCH 30.7  --  29.2 28.5   MCHC 34.0  --  33.2 33.5       BNP:  Recent Labs   Lab 12/04/23  4534  12/06/23  0410 12/08/23  0408   * 1,679* 695*       CMP:  Recent Labs   Lab 12/07/23  0417 12/07/23  0758 12/08/23  0408 12/08/23  1110 12/08/23  1450 12/08/23  1725 12/08/23  2136 12/09/23  0009 12/09/23  0323 12/09/23  1145   *   < > 209*  --  188*  --  170*  --  219*  --    CALCIUM 8.4*   < > 8.1*  --  7.8*  --  8.1*  --  8.1*  --    ALBUMIN 2.2*   < > 2.0*  2.0*  --  1.9*  --  1.9*  --  2.0*  --    PROT 6.1  --  5.7*  --   --   --   --   --  6.2  --       < > 140  --  142  --  142  --  141  --    K 5.1   < > 4.3   < > 4.1   < > 4.2 4.2 4.2 4.4   CO2 15*   < > 21*  --  28  --  26  --  24  --       < > 106  --  105  --  107  --  108  --    BUN 22   < > 13  --  10  --  7*  --  8  --    CREATININE 1.5*   < > 1.1  --  1.0  --  0.7  --  0.8  --    ALKPHOS 129  --  139*  --   --   --   --   --  147*  --    ALT 33  --  26  --   --   --   --   --  28  --    AST 79*  --  55*  --   --   --   --   --  44*  --    BILITOT 0.7  --  0.8  --   --   --   --   --  0.7  --     < > = values in this interval not displayed.        Coagulation:   Recent Labs   Lab 12/09/23  0009 12/09/23  0154 12/09/23 0323 12/09/23  0813 12/09/23  1145   INR 1.1  --  1.1  --  1.1   APTT  --  42.7*  --  54.0* 55.2*       LDH:  Recent Labs   Lab 12/07/23  0417 12/08/23  0408 12/09/23  0323   * 526* 574*       Microbiology:  Microbiology Results (last 7 days)       Procedure Component Value Units Date/Time    Culture, Respiratory [[id number]] Collected: 12/06/23 1531    Order Status: Completed Specimen: Respiratory from Bronchial Wash Updated: 12/08/23 1103     Respiratory Culture No growth     Gram Stain (Respiratory) Moderate WBC's     Gram Stain (Respiratory) Rare Gram negative rods    Narrative:      Bronchial Wash    AFB Culture & Smear [[id number]] Collected: 12/06/23 1532    Order Status: Completed Specimen: Body Fluid from Lung, Right Updated: 12/07/23 2127     AFB Culture & Smear Culture in progress     AFB  CULTURE STAIN No acid fast bacilli seen.    Narrative:      Bronchial Wash    Direct AFB stain [[id number]] Collected: 12/06/23 1532    Order Status: Completed Specimen: Body Fluid from Lung, Right Updated: 12/06/23 2257     Direct Acid Fast No acid fast bacilli seen.    Narrative:      Bronchial Wash    Fungus culture [[id number]] Collected: 12/06/23 1532    Order Status: Sent Specimen: Body Fluid from Lung, Right Updated: 12/06/23 1719    Gram stain [[id number]] Collected: 12/06/23 1532    Order Status: Canceled Specimen: Body Fluid from Lung, Right     Blood culture [[id number]] Collected: 11/28/23 1452    Order Status: Completed Specimen: Blood from Peripheral, Upper Arm, Right Updated: 12/03/23 1612     Blood Culture, Routine No growth after 5 days.    Blood culture [[id number]] Collected: 11/28/23 1450    Order Status: Completed Specimen: Blood from Peripheral, Forearm, Right Updated: 12/03/23 1612     Blood Culture, Routine No growth after 5 days.            BMP:   Recent Labs   Lab 12/09/23  0323 12/09/23  1145   *  --      --    K 4.2 4.4     --    CO2 24  --    BUN 8  --    CREATININE 0.8  --    CALCIUM 8.1*  --    MG 1.7 2.2       I have reviewed all pertinent labs within the past 24 hours.    Estimated Creatinine Clearance: 106.4 mL/min (based on SCr of 0.8 mg/dL).    Diagnostic Results:  I have reviewed all pertinent imaging results/findings within the past 24 hours.

## 2023-12-10 LAB
ALBUMIN SERPL BCP-MCNC: 2 G/DL (ref 3.5–5.2)
ALBUMIN SERPL BCP-MCNC: 2.1 G/DL (ref 3.5–5.2)
ALBUMIN SERPL BCP-MCNC: 2.2 G/DL (ref 3.5–5.2)
ALBUMIN SERPL BCP-MCNC: 2.2 G/DL (ref 3.5–5.2)
ALLENS TEST: ABNORMAL
ALLENS TEST: NORMAL
ALP SERPL-CCNC: 145 U/L (ref 55–135)
ALT SERPL W/O P-5'-P-CCNC: 26 U/L (ref 10–44)
ANION GAP SERPL CALC-SCNC: 12 MMOL/L (ref 8–16)
ANION GAP SERPL CALC-SCNC: 12 MMOL/L (ref 8–16)
ANION GAP SERPL CALC-SCNC: 13 MMOL/L (ref 8–16)
ANION GAP SERPL CALC-SCNC: 14 MMOL/L (ref 8–16)
APTT PPP: 27.1 SEC (ref 21–32)
APTT PPP: 29.2 SEC (ref 21–32)
APTT PPP: 30.2 SEC (ref 21–32)
APTT PPP: 31.3 SEC (ref 21–32)
AST SERPL-CCNC: 55 U/L (ref 10–40)
BASOPHILS # BLD AUTO: 0.06 K/UL (ref 0–0.2)
BASOPHILS NFR BLD: 0.3 % (ref 0–1.9)
BILIRUB SERPL-MCNC: 1 MG/DL (ref 0.1–1)
BIPAP: 0
BLD PROD TYP BPU: NORMAL
BLD PROD TYP BPU: NORMAL
BLOOD UNIT EXPIRATION DATE: NORMAL
BLOOD UNIT EXPIRATION DATE: NORMAL
BLOOD UNIT TYPE CODE: 6200
BLOOD UNIT TYPE CODE: 6200
BLOOD UNIT TYPE: NORMAL
BLOOD UNIT TYPE: NORMAL
BUN SERPL-MCNC: 5 MG/DL (ref 8–23)
BUN SERPL-MCNC: 5 MG/DL (ref 8–23)
BUN SERPL-MCNC: 6 MG/DL (ref 8–23)
BUN SERPL-MCNC: 7 MG/DL (ref 8–23)
CA-I BLDV-SCNC: 0.96 MMOL/L (ref 1.06–1.42)
CALCIUM SERPL-MCNC: 8.2 MG/DL (ref 8.7–10.5)
CALCIUM SERPL-MCNC: 8.5 MG/DL (ref 8.7–10.5)
CALCIUM SERPL-MCNC: 8.6 MG/DL (ref 8.7–10.5)
CALCIUM SERPL-MCNC: 8.6 MG/DL (ref 8.7–10.5)
CHLORIDE SERPL-SCNC: 103 MMOL/L (ref 95–110)
CHLORIDE SERPL-SCNC: 104 MMOL/L (ref 95–110)
CHLORIDE SERPL-SCNC: 104 MMOL/L (ref 95–110)
CHLORIDE SERPL-SCNC: 105 MMOL/L (ref 95–110)
CO2 SERPL-SCNC: 18 MMOL/L (ref 23–29)
CO2 SERPL-SCNC: 20 MMOL/L (ref 23–29)
CO2 SERPL-SCNC: 22 MMOL/L (ref 23–29)
CO2 SERPL-SCNC: 22 MMOL/L (ref 23–29)
CODING SYSTEM: NORMAL
CODING SYSTEM: NORMAL
CREAT SERPL-MCNC: 0.7 MG/DL (ref 0.5–1.4)
CROSSMATCH INTERPRETATION: NORMAL
CROSSMATCH INTERPRETATION: NORMAL
DELSYS: ABNORMAL
DELSYS: NORMAL
DIFFERENTIAL METHOD BLD: ABNORMAL
DISPENSE STATUS: NORMAL
DISPENSE STATUS: NORMAL
EOSINOPHIL # BLD AUTO: 0 K/UL (ref 0–0.5)
EOSINOPHIL NFR BLD: 0.1 % (ref 0–8)
ERYTHROCYTE [DISTWIDTH] IN BLOOD BY AUTOMATED COUNT: 18.8 % (ref 11.5–14.5)
EST. GFR  (NO RACE VARIABLE): >60 ML/MIN/1.73 M^2
FIBRINOGEN PPP-MCNC: 657 MG/DL (ref 182–400)
FIBRINOGEN PPP-MCNC: 657 MG/DL (ref 182–400)
FIBRINOGEN PPP-MCNC: 670 MG/DL (ref 182–400)
FIBRINOGEN PPP-MCNC: 697 MG/DL (ref 182–400)
FLOW: 5
GLUCOSE SERPL-MCNC: 161 MG/DL (ref 70–110)
GLUCOSE SERPL-MCNC: 161 MG/DL (ref 70–110)
GLUCOSE SERPL-MCNC: 188 MG/DL (ref 70–110)
GLUCOSE SERPL-MCNC: 230 MG/DL (ref 70–110)
HCO3 UR-SCNC: 17.5 MMOL/L (ref 24–28)
HCO3 UR-SCNC: 23.4 MMOL/L (ref 24–28)
HCO3 UR-SCNC: 24.2 MMOL/L (ref 24–28)
HCT VFR BLD AUTO: 21.1 % (ref 40–54)
HCT VFR BLD CALC: 20 %PCV (ref 36–54)
HGB BLD-MCNC: 6.6 G/DL (ref 14–18)
IMM GRANULOCYTES # BLD AUTO: 0.54 K/UL (ref 0–0.04)
IMM GRANULOCYTES NFR BLD AUTO: 3.1 % (ref 0–0.5)
INR PPP: 1.1 (ref 0.8–1.2)
INR PPP: 1.2 (ref 0.8–1.2)
LDH SERPL L TO P-CCNC: 0.4 MMOL/L (ref 0.36–1.25)
LDH SERPL L TO P-CCNC: 0.52 MMOL/L (ref 0.36–1.25)
LDH SERPL L TO P-CCNC: 0.54 MMOL/L (ref 0.36–1.25)
LDH SERPL L TO P-CCNC: 1.05 MMOL/L (ref 0.36–1.25)
LDH SERPL L TO P-CCNC: 689 U/L (ref 110–260)
LYMPHOCYTES # BLD AUTO: 1 K/UL (ref 1–4.8)
LYMPHOCYTES NFR BLD: 5.6 % (ref 18–48)
MAGNESIUM SERPL-MCNC: 1.8 MG/DL (ref 1.6–2.6)
MAGNESIUM SERPL-MCNC: 1.9 MG/DL (ref 1.6–2.6)
MAGNESIUM SERPL-MCNC: 1.9 MG/DL (ref 1.6–2.6)
MAGNESIUM SERPL-MCNC: 2 MG/DL (ref 1.6–2.6)
MAGNESIUM SERPL-MCNC: 2 MG/DL (ref 1.6–2.6)
MAGNESIUM SERPL-MCNC: 2.1 MG/DL (ref 1.6–2.6)
MCH RBC QN AUTO: 28.1 PG (ref 27–31)
MCHC RBC AUTO-ENTMCNC: 31.3 G/DL (ref 32–36)
MCV RBC AUTO: 90 FL (ref 82–98)
METHEMOGLOBIN: 1.7 % (ref 0–3)
MODE: ABNORMAL
MODE: ABNORMAL
MODE: NORMAL
MODE: NORMAL
MONOCYTES # BLD AUTO: 1.2 K/UL (ref 0.3–1)
MONOCYTES NFR BLD: 6.9 % (ref 4–15)
NEUTROPHILS # BLD AUTO: 14.7 K/UL (ref 1.8–7.7)
NEUTROPHILS NFR BLD: 84 % (ref 38–73)
NRBC BLD-RTO: 1 /100 WBC
NUM UNITS TRANS PACKED RBC: NORMAL
NUM UNITS TRANS PACKED RBC: NORMAL
PCO2 BLDA: 27.7 MMHG (ref 35–45)
PCO2 BLDA: 31.2 MMHG (ref 35–45)
PCO2 BLDA: 35.1 MMHG (ref 35–45)
PH SMN: 7.41 [PH] (ref 7.35–7.45)
PH SMN: 7.45 [PH] (ref 7.35–7.45)
PH SMN: 7.48 [PH] (ref 7.35–7.45)
PHOSPHATE SERPL-MCNC: 1.2 MG/DL (ref 2.7–4.5)
PHOSPHATE SERPL-MCNC: 1.2 MG/DL (ref 2.7–4.5)
PHOSPHATE SERPL-MCNC: 2.1 MG/DL (ref 2.7–4.5)
PHOSPHATE SERPL-MCNC: 3.4 MG/DL (ref 2.7–4.5)
PLATELET # BLD AUTO: 253 K/UL (ref 150–450)
PMV BLD AUTO: 11.9 FL (ref 9.2–12.9)
PO2 BLDA: 133 MMHG (ref 80–100)
PO2 BLDA: 133 MMHG (ref 80–100)
PO2 BLDA: 139 MMHG (ref 80–100)
POC BE: -7 MMOL/L
POC BE: 0 MMOL/L
POC BE: 0 MMOL/L
POC IONIZED CALCIUM: 1.08 MMOL/L (ref 1.06–1.42)
POC METHB: 1.7 % (ref 0–3)
POC PERFORMED BY: NORMAL
POC SATURATED O2: 99 % (ref 95–100)
POC TCO2: 18 MMOL/L (ref 23–27)
POC TCO2: 24 MMOL/L (ref 23–27)
POC TCO2: 25 MMOL/L (ref 23–27)
POC TEMPERATURE: 37 C
POCT GLUCOSE: 186 MG/DL (ref 70–110)
POCT GLUCOSE: 187 MG/DL (ref 70–110)
POCT GLUCOSE: 188 MG/DL (ref 70–110)
POCT GLUCOSE: 189 MG/DL (ref 70–110)
POCT GLUCOSE: 207 MG/DL (ref 70–110)
POTASSIUM BLD-SCNC: 4.2 MMOL/L (ref 3.5–5.1)
POTASSIUM SERPL-SCNC: 3.9 MMOL/L (ref 3.5–5.1)
POTASSIUM SERPL-SCNC: 4.4 MMOL/L (ref 3.5–5.1)
POTASSIUM SERPL-SCNC: 4.5 MMOL/L (ref 3.5–5.1)
POTASSIUM SERPL-SCNC: 4.5 MMOL/L (ref 3.5–5.1)
PROCALCITONIN SERPL IA-MCNC: 5.24 NG/ML
PROT SERPL-MCNC: 6.1 G/DL (ref 6–8.4)
PROTHROMBIN TIME: 11.9 SEC (ref 9–12.5)
PROTHROMBIN TIME: 12.1 SEC (ref 9–12.5)
PROTHROMBIN TIME: 12.1 SEC (ref 9–12.5)
PROTHROMBIN TIME: 12.4 SEC (ref 9–12.5)
RBC # BLD AUTO: 2.35 M/UL (ref 4.6–6.2)
SAMPLE: ABNORMAL
SAMPLE: NORMAL
SITE: ABNORMAL
SITE: NORMAL
SODIUM BLD-SCNC: 139 MMOL/L (ref 136–145)
SODIUM SERPL-SCNC: 134 MMOL/L (ref 136–145)
SODIUM SERPL-SCNC: 138 MMOL/L (ref 136–145)
SODIUM SERPL-SCNC: 138 MMOL/L (ref 136–145)
SODIUM SERPL-SCNC: 139 MMOL/L (ref 136–145)
SPECIMEN SOURCE: NORMAL
WBC # BLD AUTO: 17.57 K/UL (ref 3.9–12.7)

## 2023-12-10 PROCEDURE — 63600175 PHARM REV CODE 636 W HCPCS: Performed by: INTERNAL MEDICINE

## 2023-12-10 PROCEDURE — 99291 CRITICAL CARE FIRST HOUR: CPT | Mod: ,,, | Performed by: PHYSICIAN ASSISTANT

## 2023-12-10 PROCEDURE — 82803 BLOOD GASES ANY COMBINATION: CPT

## 2023-12-10 PROCEDURE — 83605 ASSAY OF LACTIC ACID: CPT

## 2023-12-10 PROCEDURE — 25000003 PHARM REV CODE 250: Performed by: STUDENT IN AN ORGANIZED HEALTH CARE EDUCATION/TRAINING PROGRAM

## 2023-12-10 PROCEDURE — 84100 ASSAY OF PHOSPHORUS: CPT | Performed by: STUDENT IN AN ORGANIZED HEALTH CARE EDUCATION/TRAINING PROGRAM

## 2023-12-10 PROCEDURE — C9113 INJ PANTOPRAZOLE SODIUM, VIA: HCPCS

## 2023-12-10 PROCEDURE — 85610 PROTHROMBIN TIME: CPT | Mod: 91 | Performed by: THORACIC SURGERY (CARDIOTHORACIC VASCULAR SURGERY)

## 2023-12-10 PROCEDURE — 83615 LACTATE (LD) (LDH) ENZYME: CPT | Performed by: STUDENT IN AN ORGANIZED HEALTH CARE EDUCATION/TRAINING PROGRAM

## 2023-12-10 PROCEDURE — 85025 COMPLETE CBC W/AUTO DIFF WBC: CPT | Performed by: THORACIC SURGERY (CARDIOTHORACIC VASCULAR SURGERY)

## 2023-12-10 PROCEDURE — 84145 PROCALCITONIN (PCT): CPT | Performed by: INTERNAL MEDICINE

## 2023-12-10 PROCEDURE — 63600175 PHARM REV CODE 636 W HCPCS: Performed by: STUDENT IN AN ORGANIZED HEALTH CARE EDUCATION/TRAINING PROGRAM

## 2023-12-10 PROCEDURE — 36430 TRANSFUSION BLD/BLD COMPNT: CPT

## 2023-12-10 PROCEDURE — 90945 DIALYSIS ONE EVALUATION: CPT

## 2023-12-10 PROCEDURE — 25000003 PHARM REV CODE 250: Performed by: INTERNAL MEDICINE

## 2023-12-10 PROCEDURE — 84295 ASSAY OF SERUM SODIUM: CPT

## 2023-12-10 PROCEDURE — 25000003 PHARM REV CODE 250

## 2023-12-10 PROCEDURE — 84132 ASSAY OF SERUM POTASSIUM: CPT

## 2023-12-10 PROCEDURE — 85730 THROMBOPLASTIN TIME PARTIAL: CPT | Mod: 91 | Performed by: THORACIC SURGERY (CARDIOTHORACIC VASCULAR SURGERY)

## 2023-12-10 PROCEDURE — 94668 MNPJ CHEST WALL SBSQ: CPT

## 2023-12-10 PROCEDURE — 25000003 PHARM REV CODE 250: Performed by: HOSPITALIST

## 2023-12-10 PROCEDURE — 27000221 HC OXYGEN, UP TO 24 HOURS

## 2023-12-10 PROCEDURE — 27000248 HC VAD-ADDITIONAL DAY

## 2023-12-10 PROCEDURE — 83735 ASSAY OF MAGNESIUM: CPT | Mod: 91

## 2023-12-10 PROCEDURE — 99233 SBSQ HOSP IP/OBS HIGH 50: CPT | Mod: ,,, | Performed by: HOSPITALIST

## 2023-12-10 PROCEDURE — P9016 RBC LEUKOCYTES REDUCED: HCPCS

## 2023-12-10 PROCEDURE — 63600367 HC NITRIC OXIDE PER HOUR

## 2023-12-10 PROCEDURE — 82800 BLOOD PH: CPT

## 2023-12-10 PROCEDURE — 94761 N-INVAS EAR/PLS OXIMETRY MLT: CPT | Mod: XB

## 2023-12-10 PROCEDURE — 99291 CRITICAL CARE FIRST HOUR: CPT | Mod: ,,, | Performed by: INTERNAL MEDICINE

## 2023-12-10 PROCEDURE — 84132 ASSAY OF SERUM POTASSIUM: CPT | Performed by: THORACIC SURGERY (CARDIOTHORACIC VASCULAR SURGERY)

## 2023-12-10 PROCEDURE — 90792 PSYCH DIAG EVAL W/MED SRVCS: CPT | Mod: ,,, | Performed by: STUDENT IN AN ORGANIZED HEALTH CARE EDUCATION/TRAINING PROGRAM

## 2023-12-10 PROCEDURE — 87040 BLOOD CULTURE FOR BACTERIA: CPT | Performed by: INTERNAL MEDICINE

## 2023-12-10 PROCEDURE — 82330 ASSAY OF CALCIUM: CPT | Performed by: STUDENT IN AN ORGANIZED HEALTH CARE EDUCATION/TRAINING PROGRAM

## 2023-12-10 PROCEDURE — 94664 DEMO&/EVAL PT USE INHALER: CPT

## 2023-12-10 PROCEDURE — 85014 HEMATOCRIT: CPT

## 2023-12-10 PROCEDURE — 99233 SBSQ HOSP IP/OBS HIGH 50: CPT | Mod: ,,, | Performed by: INTERNAL MEDICINE

## 2023-12-10 PROCEDURE — 85384 FIBRINOGEN ACTIVITY: CPT | Performed by: THORACIC SURGERY (CARDIOTHORACIC VASCULAR SURGERY)

## 2023-12-10 PROCEDURE — 37799 UNLISTED PX VASCULAR SURGERY: CPT

## 2023-12-10 PROCEDURE — 83735 ASSAY OF MAGNESIUM: CPT | Performed by: STUDENT IN AN ORGANIZED HEALTH CARE EDUCATION/TRAINING PROGRAM

## 2023-12-10 PROCEDURE — 63600175 PHARM REV CODE 636 W HCPCS: Performed by: THORACIC SURGERY (CARDIOTHORACIC VASCULAR SURGERY)

## 2023-12-10 PROCEDURE — C1751 CATH, INF, PER/CENT/MIDLINE: HCPCS

## 2023-12-10 PROCEDURE — 63600175 PHARM REV CODE 636 W HCPCS

## 2023-12-10 PROCEDURE — 86920 COMPATIBILITY TEST SPIN: CPT | Performed by: INTERNAL MEDICINE

## 2023-12-10 PROCEDURE — 83050 HGB METHEMOGLOBIN QUAN: CPT

## 2023-12-10 PROCEDURE — 25000003 PHARM REV CODE 250: Performed by: THORACIC SURGERY (CARDIOTHORACIC VASCULAR SURGERY)

## 2023-12-10 PROCEDURE — 93750 INTERROGATION VAD IN PERSON: CPT | Mod: ,,, | Performed by: INTERNAL MEDICINE

## 2023-12-10 PROCEDURE — 83735 ASSAY OF MAGNESIUM: CPT | Mod: 91 | Performed by: THORACIC SURGERY (CARDIOTHORACIC VASCULAR SURGERY)

## 2023-12-10 PROCEDURE — 82330 ASSAY OF CALCIUM: CPT

## 2023-12-10 PROCEDURE — 80069 RENAL FUNCTION PANEL: CPT

## 2023-12-10 PROCEDURE — 99900035 HC TECH TIME PER 15 MIN (STAT)

## 2023-12-10 PROCEDURE — 20000000 HC ICU ROOM

## 2023-12-10 PROCEDURE — 80053 COMPREHEN METABOLIC PANEL: CPT | Performed by: STUDENT IN AN ORGANIZED HEALTH CARE EDUCATION/TRAINING PROGRAM

## 2023-12-10 RX ORDER — MAGNESIUM SULFATE HEPTAHYDRATE 40 MG/ML
2 INJECTION, SOLUTION INTRAVENOUS
Status: DISPENSED | OUTPATIENT
Start: 2023-12-10 | End: 2023-12-11

## 2023-12-10 RX ORDER — TALC
6 POWDER (GRAM) TOPICAL ONCE
Status: COMPLETED | OUTPATIENT
Start: 2023-12-10 | End: 2023-12-10

## 2023-12-10 RX ORDER — NOREPINEPHRINE BITARTRATE/D5W 4MG/250ML
0.08 PLASTIC BAG, INJECTION (ML) INTRAVENOUS CONTINUOUS
Status: DISCONTINUED | OUTPATIENT
Start: 2023-12-10 | End: 2023-12-12

## 2023-12-10 RX ORDER — HYDROCODONE BITARTRATE AND ACETAMINOPHEN 500; 5 MG/1; MG/1
TABLET ORAL CONTINUOUS
Status: ACTIVE | OUTPATIENT
Start: 2023-12-10 | End: 2023-12-11

## 2023-12-10 RX ORDER — HYDROCODONE BITARTRATE AND ACETAMINOPHEN 500; 5 MG/1; MG/1
TABLET ORAL
Status: DISCONTINUED | OUTPATIENT
Start: 2023-12-10 | End: 2023-12-12

## 2023-12-10 RX ORDER — GABAPENTIN 300 MG/1
300 CAPSULE ORAL NIGHTLY
Status: DISCONTINUED | OUTPATIENT
Start: 2023-12-10 | End: 2023-12-10

## 2023-12-10 RX ORDER — GABAPENTIN 300 MG/1
300 CAPSULE ORAL ONCE
Status: COMPLETED | OUTPATIENT
Start: 2023-12-10 | End: 2023-12-10

## 2023-12-10 RX ORDER — INSULIN ASPART 100 [IU]/ML
7 INJECTION, SOLUTION INTRAVENOUS; SUBCUTANEOUS
Status: DISCONTINUED | OUTPATIENT
Start: 2023-12-10 | End: 2023-12-12

## 2023-12-10 RX ORDER — LIDOCAINE HYDROCHLORIDE AND EPINEPHRINE 5; 5 MG/ML; UG/ML
1 INJECTION, SOLUTION INFILTRATION; PERINEURAL ONCE
Status: CANCELLED | OUTPATIENT
Start: 2023-12-10 | End: 2023-12-10

## 2023-12-10 RX ADMIN — GABAPENTIN 300 MG: 300 CAPSULE ORAL at 10:12

## 2023-12-10 RX ADMIN — INSULIN ASPART 7 UNITS: 100 INJECTION, SOLUTION INTRAVENOUS; SUBCUTANEOUS at 12:12

## 2023-12-10 RX ADMIN — INSULIN ASPART 5 UNITS: 100 INJECTION, SOLUTION INTRAVENOUS; SUBCUTANEOUS at 08:12

## 2023-12-10 RX ADMIN — INSULIN ASPART 2 UNITS: 100 INJECTION, SOLUTION INTRAVENOUS; SUBCUTANEOUS at 04:12

## 2023-12-10 RX ADMIN — CALCIUM GLUCONATE: 98 INJECTION, SOLUTION INTRAVENOUS at 06:12

## 2023-12-10 RX ADMIN — DEXTROSE MONOHYDRATE, SODIUM CITRATE, AND CITRIC ACID MONOHYDRATE: 2.45; 2.2; .8 INJECTION, SOLUTION INTRAVENOUS at 07:12

## 2023-12-10 RX ADMIN — DEXTROSE MONOHYDRATE, SODIUM CITRATE, AND CITRIC ACID MONOHYDRATE: 2.45; 2.2; .8 INJECTION, SOLUTION INTRAVENOUS at 11:12

## 2023-12-10 RX ADMIN — MIDODRINE HYDROCHLORIDE 15 MG: 5 TABLET ORAL at 09:12

## 2023-12-10 RX ADMIN — EPINEPHRINE 0.04 MCG/KG/MIN: 1 INJECTION INTRAMUSCULAR; INTRAVENOUS; SUBCUTANEOUS at 01:12

## 2023-12-10 RX ADMIN — INSULIN ASPART 7 UNITS: 100 INJECTION, SOLUTION INTRAVENOUS; SUBCUTANEOUS at 03:12

## 2023-12-10 RX ADMIN — PIPERACILLIN SODIUM AND TAZOBACTAM SODIUM 4.5 G: 4; .5 INJECTION, POWDER, FOR SOLUTION INTRAVENOUS at 03:12

## 2023-12-10 RX ADMIN — CALCIUM GLUCONATE: 98 INJECTION, SOLUTION INTRAVENOUS at 01:12

## 2023-12-10 RX ADMIN — SODIUM PHOSPHATE, MONOBASIC, MONOHYDRATE AND SODIUM PHOSPHATE, DIBASIC, ANHYDROUS 39.99 MMOL: 142; 276 INJECTION, SOLUTION INTRAVENOUS at 12:12

## 2023-12-10 RX ADMIN — MIDODRINE HYDROCHLORIDE 15 MG: 5 TABLET ORAL at 03:12

## 2023-12-10 RX ADMIN — CALCIUM GLUCONATE: 98 INJECTION, SOLUTION INTRAVENOUS at 03:12

## 2023-12-10 RX ADMIN — VANCOMYCIN HYDROCHLORIDE 1500 MG: 1.5 INJECTION, POWDER, LYOPHILIZED, FOR SOLUTION INTRAVENOUS at 08:12

## 2023-12-10 RX ADMIN — NOREPINEPHRINE BITARTRATE 0.04 MCG/KG/MIN: 4 INJECTION, SOLUTION INTRAVENOUS at 02:12

## 2023-12-10 RX ADMIN — SODIUM PHOSPHATE, MONOBASIC, MONOHYDRATE AND SODIUM PHOSPHATE, DIBASIC, ANHYDROUS 20.01 MMOL: 142; 276 INJECTION, SOLUTION INTRAVENOUS at 06:12

## 2023-12-10 RX ADMIN — WARFARIN SODIUM 2 MG: 2 TABLET ORAL at 05:12

## 2023-12-10 RX ADMIN — Medication 324 MG: at 08:12

## 2023-12-10 RX ADMIN — INSULIN ASPART 2 UNITS: 100 INJECTION, SOLUTION INTRAVENOUS; SUBCUTANEOUS at 09:12

## 2023-12-10 RX ADMIN — VASOPRESSIN 0.04 UNITS/MIN: 20 INJECTION INTRAVENOUS at 06:12

## 2023-12-10 RX ADMIN — VASOPRESSIN 0.04 UNITS/MIN: 20 INJECTION INTRAVENOUS at 12:12

## 2023-12-10 RX ADMIN — GABAPENTIN 300 MG: 300 CAPSULE ORAL at 03:12

## 2023-12-10 RX ADMIN — MEROPENEM 2 G: 1 INJECTION, POWDER, FOR SOLUTION INTRAVENOUS at 06:12

## 2023-12-10 RX ADMIN — CALCIUM GLUCONATE: 98 INJECTION, SOLUTION INTRAVENOUS at 08:12

## 2023-12-10 RX ADMIN — Medication 6 MG: at 12:12

## 2023-12-10 RX ADMIN — ACETAMINOPHEN 650 MG: 325 TABLET ORAL at 05:12

## 2023-12-10 RX ADMIN — PANTOPRAZOLE SODIUM 40 MG: 40 INJECTION, POWDER, FOR SOLUTION INTRAVENOUS at 09:12

## 2023-12-10 RX ADMIN — MAGNESIUM SULFATE HEPTAHYDRATE 2 G: 40 INJECTION, SOLUTION INTRAVENOUS at 12:12

## 2023-12-10 RX ADMIN — Medication 0.04 MCG/KG/MIN: at 11:12

## 2023-12-10 RX ADMIN — DEXTROSE MONOHYDRATE, SODIUM CITRATE, AND CITRIC ACID MONOHYDRATE: 2.45; 2.2; .8 INJECTION, SOLUTION INTRAVENOUS at 06:12

## 2023-12-10 RX ADMIN — CALCIUM GLUCONATE: 98 INJECTION, SOLUTION INTRAVENOUS at 09:12

## 2023-12-10 RX ADMIN — INSULIN ASPART 5 UNITS: 100 INJECTION, SOLUTION INTRAVENOUS; SUBCUTANEOUS at 04:12

## 2023-12-10 RX ADMIN — ATORVASTATIN CALCIUM 40 MG: 10 TABLET, FILM COATED ORAL at 09:12

## 2023-12-10 RX ADMIN — AMIODARONE HYDROCHLORIDE 400 MG: 200 TABLET ORAL at 09:12

## 2023-12-10 RX ADMIN — DEXTROSE MONOHYDRATE, SODIUM CITRATE, AND CITRIC ACID MONOHYDRATE: 2.45; 2.2; .8 INJECTION, SOLUTION INTRAVENOUS at 08:12

## 2023-12-10 RX ADMIN — DEXTROSE MONOHYDRATE, SODIUM CITRATE, AND CITRIC ACID MONOHYDRATE: 2.45; 2.2; .8 INJECTION, SOLUTION INTRAVENOUS at 01:12

## 2023-12-10 RX ADMIN — PIPERACILLIN SODIUM AND TAZOBACTAM SODIUM 4.5 G: 4; .5 INJECTION, POWDER, FOR SOLUTION INTRAVENOUS at 08:12

## 2023-12-10 RX ADMIN — MAGNESIUM SULFATE HEPTAHYDRATE 2 G: 40 INJECTION, SOLUTION INTRAVENOUS at 06:12

## 2023-12-10 RX ADMIN — SODIUM PHOSPHATE, MONOBASIC, MONOHYDRATE AND SODIUM PHOSPHATE, DIBASIC, ANHYDROUS 30 MMOL: 142; 276 INJECTION, SOLUTION INTRAVENOUS at 11:12

## 2023-12-10 RX ADMIN — VASOPRESSIN 0.04 UNITS/MIN: 20 INJECTION INTRAVENOUS at 09:12

## 2023-12-10 RX ADMIN — MICAFUNGIN SODIUM 100 MG: 100 INJECTION, POWDER, LYOPHILIZED, FOR SOLUTION INTRAVENOUS at 06:12

## 2023-12-10 RX ADMIN — CALCIUM GLUCONATE: 98 INJECTION, SOLUTION INTRAVENOUS at 05:12

## 2023-12-10 RX ADMIN — Medication 6 MG: at 10:12

## 2023-12-10 RX ADMIN — ANGIOTENSIN II 40 NG/KG/MIN: 2.5 INJECTION INTRAVENOUS at 07:12

## 2023-12-10 RX ADMIN — INSULIN ASPART 2 UNITS: 100 INJECTION, SOLUTION INTRAVENOUS; SUBCUTANEOUS at 12:12

## 2023-12-10 NOTE — PROGRESS NOTES
[facility] - Surgical Intensive Care  Critical Care - Surgery  Progress Note    Patient Name: Radha Abbott  MRN: [id number]  Admission Date: 11/9/2023  Hospital Length of Stay: 31 days  Code Status: Full Code  Attending Provider: Yuri Washington MD  Primary Care Provider: Vasu Kong MD   Principal Problem: Acute on chronic combined systolic and diastolic CHF, NYHA class 4    Subjective:       Interval History/Significant Events: Since yesterday, patient has had to be restarted on vasopressors (epi, levo, vaso all at 0.04) while on SLED and midodrine increased to 15mg TID. Dobutamine was restarted yesterday as well however discontinued this AM. Pt also stated he wanted to end his life yesterday for which psych has been consulted.     Follow-up For: Procedure(s) (LRB):  CLOSURE, WOUND, STERNUM (N/A)  INSERTION, GRAFT, PERICARDIUM  DRAINAGE, PLEURAL EFFUSION    Post-Operative Day: 6 Days Post-Op    Objective:     Vital Signs (Most Recent):  Temp: 97.6 °F (36.4 °C) (12/10/23 0730)  Pulse: 97 (12/10/23 1000)  Resp: 18 (12/08/23 2305)  BP: (!) 90/0 (12/09/23 2300)  SpO2: 99 % (12/10/23 0345) Vital Signs (24h Range):  Temp:  [97.6 °F (36.4 °C)-98.2 °F (36.8 °C)] 97.6 °F (36.4 °C)  Pulse:  [] 97  SpO2:  [99 %-100 %] 99 %  BP: (78-90)/(0) 90/0  Arterial Line BP: (72-92)/(54-71) 88/70     Weight: 78 kg (172 lb)  Body mass index is 23.33 kg/m².      Intake/Output Summary (Last 24 hours) at 12/10/2023 1012  Last data filed at 12/10/2023 0835  Gross per 24 hour   Intake 6102.17 ml   Output 6409 ml   Net -306.83 ml          Physical Exam  Constitutional:       General: He is not in acute distress.  HENT:      Head: Normocephalic and atraumatic.      Mouth/Throat:      Mouth: Mucous membranes are dry.      Pharynx: Oropharynx is clear.   Eyes:      Extraocular Movements: Extraocular movements intact.      Pupils: Pupils are equal, round, and reactive to light.   Cardiovascular:      Rate and Rhythm: Normal rate.       Pulses: Normal pulses.   Pulmonary:      Effort: Pulmonary effort is normal.   Abdominal:      Palpations: Abdomen is soft.   Musculoskeletal:         General: Normal range of motion.      Cervical back: Normal range of motion.   Skin:     General: Skin is warm and dry.   Neurological:      General: No focal deficit present.      Mental Status: He is alert.            Vents:  Vent Mode: Spont (12/09/23 0843)  Ventilator Initiated: Yes (12/06/23 1233)  Set Rate: 18 BPM (12/09/23 0255)  Vt Set: 450 mL (12/09/23 0255)  Pressure Support: 10 cmH20 (12/09/23 0843)  PEEP/CPAP: 5 cmH20 (12/09/23 0843)  Oxygen Concentration (%): 40 (12/09/23 1000)  Peak Airway Pressure: 16 cmH20 (12/09/23 0843)  Plateau Pressure: 18 cmH20 (12/09/23 0843)  Total Ve: 8.94 L/m (12/09/23 0843)  Negative Inspiratory Force (cm H2O): -25 (12/09/23 1010)  F/VT Ratio<105 (RSBI): (!) 57.47 (12/08/23 1535)    Lines/Drains/Airways       Peripherally Inserted Central Catheter Line  Duration             PICC Triple Lumen 12/05/23 1208 right basilic 4 days              Central Venous Catheter Line  Duration             Trialysis (Dialysis) Catheter 12/01/23 1530 left internal jugular 8 days              Drain  Duration                  NG/OG Tube 12/04/23 1730 Left nostril 5 days              Arterial Line  Duration             Arterial Line 12/06/23 1526 Left Radial 3 days              Line  Duration                  VAD 12/01/23 1015 Left ventricular assist device HeartMate 3 8 days              Peripheral Intravenous Line  Duration                  Peripheral IV - Single Lumen 12/08/23 0620 20 G Left Forearm 2 days                    Significant Labs:    CBC/Anemia Profile:  Recent Labs   Lab 12/09/23  0323 12/10/23  0309 12/10/23  0406   WBC 10.13  --  17.57*   HGB 7.1*  --  6.6*   HCT 21.2* 20* 21.1*     --  253   MCV 85  --  90   RDW 16.5*  --  18.8*        Chemistries:  Recent Labs   Lab 12/09/23  0323 12/09/23  1145 12/09/23  1417  12/09/23  1734 12/09/23  2156 12/10/23  0406     --  142  --  140 139   K 4.2   < > 4.2 4.2 4.5 4.4     --  106  --  107 105   CO2 24  --  24  --  20* 20*   BUN 8  --  7*  --  6* 6*   CREATININE 0.8  --  0.7  --  0.7 0.7   CALCIUM 8.1*  --  8.3*  --  8.3* 8.5*   ALBUMIN 2.0*  --  2.1*  --  2.1* 2.1*   PROT 6.2  --   --   --   --  6.1   BILITOT 0.7  --   --   --   --  1.0   ALKPHOS 147*  --   --   --   --  145*   ALT 28  --   --   --   --  26   AST 44*  --   --   --   --  55*   MG 1.7   < > 1.9 1.8 1.7 1.9   PHOS 1.5*  --  2.6*  --  1.3* 3.4    < > = values in this interval not displayed.       All pertinent labs within the past 24 hours have been reviewed.    Significant Imaging:  I have reviewed all pertinent imaging results/findings within the past 24 hours.  Assessment/Plan:     Cardiac/Vascular  * Acute on chronic combined systolic and diastolic CHF, NYHA class 4    Neuro/Psych:   -- Sedation: none; seroquel 50 Q6 PRN for agitation  -- Pain: d/c all narcotics  -- gabapentin 300mg QHS  -- psych consulted              Cardiac:   -- S/P redo sternotomy LVAD with Dr. Washington on 12/01, chest closed 12/4  -- MAP Goal: 70-80  -- Cardene PRN  -- Pressors: vaso @ 0.04 units/min, epi @ 0.04mcg/kg/min, levo @ 0.04mcg/kg/min  -- midodrine 15 TID  -- Anti-HTNs: none  -- Rhythm: NSR  -- amiodarone 400mg daily  -- LVAD heartmate III: RPM 4800, flow 3.6  -- Statin: Atorvastatin 40 mg QD  -- No ASA at this time  -- heparin 1000/hr, PTT goal 45-54  -- coumadin 2mg      Pulmonary:   -- Goal SpO2 >92%  -- supplemental O2 as needed      Renal:  -- Trend BUN/Cr   -- Maintain [place], record strict Is/Os  -- on SLED   -- net -500cc/24 hrs goal    Recent Labs   Lab 12/09/23  1417 12/09/23  2156 12/10/23  0406   BUN 7* 6* 6*   CREATININE 0.7 0.7 0.7        FEN / GI:   -- Daily CMP, PRN K/Mag/Phos per protocol   -- Replace electrolytes as needed  -- Nutrition: TF at goal 40/hr, having BM  -- Bowel Regimen: Miralax, docusate  --  SLP consult       ID:   -- Afebrile  -- WBC stable  -- Abx: zosyn 4.5g Q8H x5 days    Recent Labs   Lab 12/08/23  0408 12/09/23  0323 12/10/23  0406   WBC 10.42 10.13 17.57*           Heme/Onc:   -- Hgb 8.7 pre-operatively  -- received 7 RBC, 2 FFP, 1 platelet, 10 cry intra-op  -- 12/1: 1 RBC, 4 FFP, 2 plts, 1 cryo  -- q4 CBC and coags  -- q4 fibrinogen - keep fibrinogen >300  -- holding ASA, on coumadin       Recent Labs   Lab 12/08/23  0408 12/08/23  0716 12/09/23  0323 12/09/23  0813 12/09/23  1734 12/09/23  2321 12/10/23  0406   HGB 6.3*  --  7.1*  --   --   --  6.6*     --  231  --   --   --  253   APTT 34.3*   < >  --    < > 43.8* 31.3 30.2   INR 1.1   < > 1.1   < > 1.1 1.1 1.1    < > = values in this interval not displayed.           Endocrine:   -- CTS Goal -140  -- HgbA1c: 7.6  -- Endocrinology consulted for insulin management      PPx:   Feeding:TF at goal  Analgesia/Sedation: seroquel 50 Q6  Thromboembolic Prevention: SCDs  HOB >30: Yes  Stress Ulcer: pantoprazole   Glucose Control: Yes, insulin management per Endocrinology  Bowel reg: Miralax, docusate  Invasive Lines/Drains/Airway:   OGT  Left radial arterial line    PICC line   LVAD     Dispo/Code Status/Palliative:     - Continue SICU Care    - Full Code           Critical care was time spent personally by me on the following activities: development of treatment plan with patient or surrogate and bedside caregivers, discussions with consultants, evaluation of patient's response to treatment, examination of patient, ordering and performing treatments and interventions, ordering and review of laboratory studies, ordering and review of radiographic studies, pulse oximetry, re-evaluation of patient's condition.  This critical care time did not overlap with that of any other provider or involve time for any procedures.     Ashanti Pete MD  Critical Care - Surgery  [facility] - Surgical Intensive Care

## 2023-12-10 NOTE — ASSESSMENT & PLAN NOTE
Known hx of pAF. In sinus rhythm on admission, but 1 run of AF RVR overnight. He spontaneously converted.  - Continue home amiodarone 400mg qd  - A/c per primary team

## 2023-12-10 NOTE — PROGRESS NOTES
[facility] - Surgical Intensive Care  Heart Transplant  Progress Note    Patient Name: Radha Abbott  MRN: [id number]  Admission Date: 11/9/2023  Hospital Length of Stay: 31 days  Attending Physician: Yuri Washington MD  Primary Care Provider: Vasu Kong MD  Principal Problem:Acute on chronic combined systolic and diastolic CHF, NYHA class 4    Subjective:   Interval History: Patient POD #10 s/p HM3 LVAD implant. Re-extubated yesterday. Much more awake this AM, frustrated and discouraged this AM and wants to go home. CVP down to 3. Anuric on CRRT, net -I've 1.2L/24h.  Pressor requirements up some compared to yesterday: On epi .04, levo .02, vaso .04, Ketty 5.     Continuous Infusions:   sodium chloride 0.9%      sodium chloride 0.9% 5 mL/hr at 12/10/23 0700    calcium gluconate 3 g in dextrose 5 % (D5W) 100 mL infusion      dexmedeTOMIDine (Precedex) infusion (titrating) 1 mcg/kg/hr (12/09/23 1000)    dextrose 10 % in water (D10W)      dextrose-sod citrate-citric ac      DOBUTamine      EPINEPHrine 0.04 mcg/kg/min (12/10/23 0700)    heparin (porcine) in 5 % dex 600 Units/hr (12/10/23 0700)    nicardipine 5 mg/hr (12/09/23 0600)    nitric oxide gas      NORepinephrine bitartrate-D5W 0.02 mcg/kg/min (12/10/23 0700)    vasopressin 0.04 Units/min (12/10/23 0700)     Scheduled Meds:   amiodarone  400 mg Per NG tube Daily    atorvastatin  40 mg Per NG tube Daily    ferrous gluconate  324 mg Per NG tube Daily with breakfast    gabapentin  300 mg Oral QHS    insulin aspart U-100  5 Units Subcutaneous 6 times per day    midodrine  15 mg Oral TID    pantoprazole  40 mg Intravenous BID    piperacillin-tazobactam (Zosyn) IV (PEDS and ADULTS) (extended infusion is not appropriate)  4.5 g Intravenous Q8H    polyethylene glycol  17 g Per NG tube BID    senna-docusate 8.6-50 mg  2 tablet Per NG tube BID    warfarin  2 mg Per NG tube Daily     PRN Meds:0.9%  NaCl infusion (for blood administration), acetaminophen, albumin human  5%, albuterol sulfate, bisacodyL, dextrose 10 % in water (D10W), dextrose 10%, dextrose 10%, glucagon (human recombinant), insulin aspart U-100, magnesium hydroxide 400 mg/5 ml, magnesium sulfate IVPB, magnesium sulfate IVPB, potassium chloride, potassium chloride, QUEtiapine, Flushing PICC/Midline Protocol **AND** [DISCONTINUED] sodium chloride 0.9% **AND** sodium chloride 0.9%, sodium phosphate 20.01 mmol in dextrose 5 % (D5W) 250 mL IVPB, sodium phosphate 20.01 mmol in dextrose 5 % (D5W) 250 mL IVPB, sodium phosphate 30 mmol in dextrose 5 % (D5W) 250 mL IVPB, sodium phosphate 30 mmol in dextrose 5 % (D5W) 250 mL IVPB, sodium phosphate 39.99 mmol in dextrose 5 % (D5W) 250 mL IVPB, sodium phosphate 39.99 mmol in dextrose 5 % (D5W) 250 mL IVPB    Review of patient's allergies indicates:  No Known Allergies  Objective:     Vital Signs (Most Recent):  Temp: 97.6 °F (36.4 °C) (12/10/23 0730)  Pulse: 96 (12/10/23 0830)  Resp: 18 (12/08/23 2305)  BP: (!) 90/0 (12/09/23 2300)  SpO2: 99 % (12/10/23 0345) Vital Signs (24h Range):  Temp:  [97.6 °F (36.4 °C)-98.2 °F (36.8 °C)] 97.6 °F (36.4 °C)  Pulse:  [] 96  SpO2:  [98 %-100 %] 99 %  BP: (78-90)/(0) 90/0  Arterial Line BP: (72-92)/(54-68) 81/62     Patient Vitals for the past 72 hrs (Last 3 readings):   Weight   12/10/23 0509 78 kg (172 lb)   12/08/23 1420 87.1 kg (192 lb)   12/08/23 0500 87.1 kg (192 lb)       Body mass index is 23.33 kg/m².      Intake/Output Summary (Last 24 hours) at 12/10/2023 0843  Last data filed at 12/10/2023 0710  Gross per 24 hour   Intake 6469.99 ml   Output 7204 ml   Net -734.01 ml         Hemodynamic Parameters:       Telemetry: NSR        Physical Exam  Constitutional:       Appearance: He is ill-appearing.   HENT:      Head: Normocephalic and atraumatic.   Eyes:      Pupils: Pupils are equal, round, and reactive to light.   Cardiovascular:      Rate and Rhythm: Normal rate and regular rhythm.      Comments: LVAD hum  Pulmonary:       Breath sounds: Rhonchi and rales present.   Abdominal:      General: Abdomen is flat. Bowel sounds are normal. There is no distension.      Palpations: Abdomen is soft.   Musculoskeletal:         General: Normal range of motion.      Cervical back: Neck supple.   Skin:     General: Skin is warm and dry.   Neurological:      Comments: Awake and oriented.             Significant Labs:  CBC:  Recent Labs   Lab 12/08/23  0408 12/09/23  0323 12/10/23  0309 12/10/23  0406   WBC 10.42 10.13  --  17.57*   RBC 2.16* 2.49*  --  2.35*   HGB 6.3* 7.1*  --  6.6*   HCT 19.0* 21.2* 20* 21.1*    231  --  253   MCV 88 85  --  90   MCH 29.2 28.5  --  28.1   MCHC 33.2 33.5  --  31.3*       BNP:  Recent Labs   Lab 12/04/23  0412 12/06/23  0410 12/08/23  0408   * 1,679* 695*       CMP:  Recent Labs   Lab 12/08/23  0408 12/08/23  1110 12/09/23  0323 12/09/23  1145 12/09/23  1417 12/09/23  1734 12/09/23  2156 12/10/23  0406   *   < > 219*  --  182*  --  156* 188*   CALCIUM 8.1*   < > 8.1*  --  8.3*  --  8.3* 8.5*   ALBUMIN 2.0*  2.0*   < > 2.0*  --  2.1*  --  2.1* 2.1*   PROT 5.7*  --  6.2  --   --   --   --  6.1      < > 141  --  142  --  140 139   K 4.3   < > 4.2   < > 4.2 4.2 4.5 4.4   CO2 21*   < > 24  --  24  --  20* 20*      < > 108  --  106  --  107 105   BUN 13   < > 8  --  7*  --  6* 6*   CREATININE 1.1   < > 0.8  --  0.7  --  0.7 0.7   ALKPHOS 139*  --  147*  --   --   --   --  145*   ALT 26  --  28  --   --   --   --  26   AST 55*  --  44*  --   --   --   --  55*   BILITOT 0.8  --  0.7  --   --   --   --  1.0    < > = values in this interval not displayed.        Coagulation:   Recent Labs   Lab 12/09/23  1734 12/09/23  2321 12/10/23  0406   INR 1.1 1.1 1.1   APTT 43.8* 31.3 30.2       LDH:  Recent Labs   Lab 12/08/23  0408 12/09/23  0323 12/10/23  0406   * 574* 689*       Microbiology:  Microbiology Results (last 7 days)       Procedure Component Value Units Date/Time    Culture,  Respiratory [[id number]] Collected: 12/06/23 1531    Order Status: Completed Specimen: Respiratory from Bronchial Wash Updated: 12/08/23 1103     Respiratory Culture No growth     Gram Stain (Respiratory) Moderate WBC's     Gram Stain (Respiratory) Rare Gram negative rods    Narrative:      Bronchial Wash    AFB Culture & Smear [[id number]] Collected: 12/06/23 1532    Order Status: Completed Specimen: Body Fluid from Lung, Right Updated: 12/07/23 2127     AFB Culture & Smear Culture in progress     AFB CULTURE STAIN No acid fast bacilli seen.    Narrative:      Bronchial Wash    Direct AFB stain [[id number]] Collected: 12/06/23 1532    Order Status: Completed Specimen: Body Fluid from Lung, Right Updated: 12/06/23 2257     Direct Acid Fast No acid fast bacilli seen.    Narrative:      Bronchial Wash    Fungus culture [[id number]] Collected: 12/06/23 1532    Order Status: Sent Specimen: Body Fluid from Lung, Right Updated: 12/06/23 1719    Gram stain [[id number]] Collected: 12/06/23 1532    Order Status: Canceled Specimen: Body Fluid from Lung, Right     Blood culture [[id number]] Collected: 11/28/23 1452    Order Status: Completed Specimen: Blood from Peripheral, Upper Arm, Right Updated: 12/03/23 1612     Blood Culture, Routine No growth after 5 days.    Blood culture [[id number]] Collected: 11/28/23 1450    Order Status: Completed Specimen: Blood from Peripheral, Forearm, Right Updated: 12/03/23 1612     Blood Culture, Routine No growth after 5 days.            BMP:   Recent Labs   Lab 12/10/23  0406   *      K 4.4      CO2 20*   BUN 6*   CREATININE 0.7   CALCIUM 8.5*   MG 1.9       I have reviewed all pertinent labs within the past 24 hours.    Estimated Creatinine Clearance: 121.6 mL/min (based on SCr of 0.7 mg/dL).    Diagnostic Results:  I have reviewed all pertinent imaging results/findings within the past 24 hours.  Assessment and Plan:     63 year old male with hx of CAD s/p 3v CABG  (unclear anatomy, 2009), ICM with a recent EF of 15-20% s/p ICD (medmauricio 2009), DM2 (a1c 7.7), HTN, HLD, Vfib on amio who presents to the ED with CC of SOB     [place] [facility] [place].  He was started on a Lasix gtt and did well.  Started on gDMT and discharged home on  5 with plans to follow up in [facility] clinic for ongoing transplant evaluation at another facility.  He says that about a few days ago he started to notice LE swelling.  This turned into Nelson and orthopnea.  He says he can't walk to the bathroom without getting SOB.  He also complains of weight gain.  He takes torsemide 20mg BID at home and was told to trial additional Lasix which he did without any improvement.  He was rx metolazone but has not been filled.  He came to the ED     In the ED he was AF with stable VS on RA.  CBC showing chronic anemia.  CMP notable for acute on chronic CKD with baseline around 2.1 and Cr now 2.6.  BNP elevated.  Lactate neg.  CXR showing pulmonary edema.  I evaluated the pt at the bedside.  Bedside TTE showing CVP >15.  He was subsequently admitted to the CCU for diuresis.     He was continued on his home  and was started on a lasix gtt, which he responded well to overnight (net -1700cc. He feels much better this morning as well. Our transplant coordinators have been working on insurance approval and he is now being transferred to [facility] service for transplant evaluation.     * Acute on chronic combined systolic and diastolic CHF, NYHA class 4  Pt with known ICM with an EF of 25% on home  presented with decompensated HF.  Not in cardiogenic shock    RHC 11/14: RA 14, PA 70/35, PCWP 32, CO 4.1, CI 2.1.   Repeat RHC 11/20 RA RA  20  PA 60/29 (39)  PCWP 29    CO  4.6 l/min  CI  2.3 l/min/m2  2D echo 11/21 showed EF 15-20%, mild RV dysfunction, mild MR, LVEDD 6.9     Repeat on 11/24 showed EF 10-15%, RV mildly reduced, mild-mod MR, LVEDD 6.42  - Home  gtt at 5  - home GDMT: entresto 24/26mg BID  (on hold 2/2 IVÁN), Hydralazine 25 q8h - held prior to LVAD implant   - home diuretics: Was taking lasix 40 bid  - Bridged to LVAD with IABP s/p HM3 implant 12/1        -PT/OT and nutrition   -s/p LVAD (12/1) Speed increased on 12/7 to 4900 RPMs  - Remains on  5, epi 0.01, Ketty off  - CTS primary            Altered mental status  -multifactorial  -likely metabolic encephalopathy +/- icu delirium   -CTH 12/7 negative for acute process  -Continue RRT for metabolic clearance  -Wean sedation as able  -EEG done yesterday, no seizures detected, just generalized slowing  -provide frequent re-orientation  -Promote sleep/wake cycle   -Neurology consulted   - Much more awake this AM, continue to encourage and motivate    LVAD (left ventricular assist device) present  -HeartMate 3 Implanted  12/1/2023  as DT  -CTS Primary  -Implanted by Dr. Washington  -HOLD Coumadin,  Goal INR 2.0-3.0 . Subtherapeutic today. On Heparin gtt   -Antiplatelets Not on ASA  -LDH is stable overall today. Will continue to monitor daily.  -Speed set at  4900   rpm, LSL 4500 rpm   -Interrogation notable for no events  -Not listed for OHTx, declined for OHTX due to comorbidities         Procedure: Device Interrogation Including analysis of device parameters  Current Settings: Ventricular Assist Device  Review of device function is stable/unstable stable        12/10/2023    10:00 AM 12/10/2023     9:00 AM 12/10/2023     8:00 AM 12/10/2023     7:00 AM 12/10/2023     6:00 AM 12/10/2023     5:00 AM 12/10/2023     4:00 AM   TXP LVAD INTERROGATIONS   Type HeartMate3 HeartMate3 HeartMate3 HeartMate3 HeartMate3 HeartMate3 HeartMate3   Flow 4 4.2 3.9 3.9 3.9 3.7 3.6   Speed 4950 4900 4900 4900 4900 4900 4900   PI 3.8 3.7 3.7 4.5 5.1 4.9 5.7   Power ([place]) 3.3 3.5 3.5 3.3 3.2 3.3 3.3   LSL 4500 4500 4500 4500 4500 4500 4500   Pulsatility Intermittent pulse Intermittent pulse Intermittent pulse Intermittent pulse Intermittent pulse Intermittent pulse Intermittent  pulse         PAF (paroxysmal atrial fibrillation)  Known hx of pAF. In sinus rhythm on admission, but 1 run of AF RVR overnight. He spontaneously converted.  - Continue home amiodarone 400mg qd  - A/c per primary team      Acute renal failure with acute tubular necrosis superimposed on stage 3b chronic kidney disease  IVÁN on CKD2. Baseline Cr of 1.7-2.0.   - Hold ARNi, entresto  -Trend BMPs daily   -SrCr 1.1/BUN 13 today  -SLED/SCUF for volume removal   -Nephrology following     Type 2 diabetes mellitus without complication, without long-term current use of insulin  -A1c 7.6, not insulin dependent  - MDSSI  -Currently on Insulin gtt   - Endocrine following, appreciate assistance with blood glucose management    CAD (coronary artery disease)  -S/p 3vCABG in 2009  - home ASA, HI statin- on hold after surgery     Ventricular tachycardia  Hx VT s/p ICD placement (medtronic 2009)  - Continue home amio 400mg qd      Uninterrupted Critical Care/Counseling Time (not including procedures): 60 min.       Jimy An PA-C  Heart Transplant  [facility] - [facility]

## 2023-12-10 NOTE — SUBJECTIVE & OBJECTIVE
Interval History: Now extubated. Increased pressor requirements. Mild leukocytosis - new. Brother at bedside.    Review of Systems   Constitutional:  Negative for chills and fever.   All other systems reviewed and are negative.    Objective:     Vital Signs (Most Recent):  Temp: 97.6 °F (36.4 °C) (12/10/23 0730)  Pulse: 97 (12/10/23 1000)  Resp: 18 (12/08/23 2305)  BP: (!) 90/0 (12/09/23 2300)  SpO2: 99 % (12/10/23 0345) Vital Signs (24h Range):  Temp:  [97.6 °F (36.4 °C)-98.2 °F (36.8 °C)] 97.6 °F (36.4 °C)  Pulse:  [] 97  SpO2:  [99 %-100 %] 99 %  BP: (90)/(0) 90/0  Arterial Line BP: (72-92)/(54-71) 88/70     Weight: 78 kg (172 lb)  Body mass index is 23.33 kg/m².    Estimated Creatinine Clearance: 121.6 mL/min (based on SCr of 0.7 mg/dL).     Physical Exam  Vitals and nursing note reviewed.   Constitutional:       Appearance: Normal appearance.   HENT:      Head: Normocephalic.      Right Ear: External ear normal.      Left Ear: External ear normal.   Eyes:      Extraocular Movements: Extraocular movements intact.      Pupils: Pupils are equal, round, and reactive to light.   Neck:      Comments: Lines  Cardiovascular:      Rate and Rhythm: Normal rate.      Comments: humm  Abdominal:      Tenderness: There is no abdominal tenderness. There is no guarding.   Skin:     Findings: No erythema or rash.   Neurological:      General: No focal deficit present.      Mental Status: He is alert.   Psychiatric:         Mood and Affect: Mood normal.         Behavior: Behavior normal.          Significant Labs: Blood Culture:   Recent Labs   Lab 11/28/23  1450 11/28/23  1452   LABBLOO No growth after 5 days. No growth after 5 days.     BMP:   Recent Labs   Lab 12/10/23  0406 12/10/23  1127   *  --      --    K 4.4 4.4     --    CO2 20*  --    BUN 6*  --    CREATININE 0.7  --    CALCIUM 8.5*  --    MG 1.9 2.1     CBC:   Recent Labs   Lab 12/09/23  0323 12/10/23  0309 12/10/23  0406   WBC 10.13  --   "17.57*   HGB 7.1*  --  6.6*   HCT 21.2* 20* 21.1*     --  253     Procalcitonin:   Recent Labs   Lab 12/09/23  0323 12/10/23  0406   PROCAL 7.15* 5.24*     Respiratory Culture:   Recent Labs   Lab 12/06/23  1531   GSRESP Moderate WBC's  Rare Gram negative rods   RESPIRATORYC No growth     Urine Culture: No results for input(s): "LABURIN" in the last 4320 hours.    Significant Imaging: I have reviewed all pertinent imaging results/findings within the past 24 hours.  "

## 2023-12-10 NOTE — PROGRESS NOTES
[facility]  Nephrology  Progress Note    Patient Name: Radha Abbott  MRN: [id number]  Admission Date: 11/9/2023  Hospital Length of Stay: 30 days  Attending Provider: Yuri Washington MD   Primary Care Physician: Vasu Kong MD  Principal Problem:Acute on chronic combined systolic and diastolic CHF, NYHA class 4    Subjective:     HPI: Mr. Abbott is a 63-year-old man with ischemic cardiomyopathy with most recent TTE showing EF 10 -15% and G3DD s/p [employer] ICM placement on chronic dobutamine infusion, CAD s/p x3 vessel CABG back in 2009, type 2 diabetes mellitus (most recent hemoglobin A1c 7.6%), hypertension, HLD, history of ventricular fibrillation for which he is on amiodarone and CKD IIIb (baseline creatinine ~2-2.2) who was initially admitted on 11/9 with heart failure exacerbation and hypervolemia for which he was managed with inotrope with dobutamine in addition to IV diuresis. He ultimately underwent IABP placed on 11/21 for mechanical hemodynamic support and subsequently underwent LVAD placement on 12/1. His renal function appears to be mostly stable with IVÁN and creatinine in mid 2s to 3s in addition he is still making urine with Lasix infusion however on 12/5 Nephrology consulted given concern for uremia with BUN 72.     Interval History: Patient seen and examined this AM. Clotted overnight on SLED   Increase ca to 20 ml /hr  Citrate 150 ml/hr    Review of patient's allergies indicates:  No Known Allergies  Current Facility-Administered Medications   Medication Frequency    0.9%  NaCl infusion (for blood administration) Q24H PRN    0.9%  NaCl infusion Continuous    acetaminophen tablet 650 mg Q6H PRN    albumin human 5% bottle 25 g PRN    albuterol sulfate nebulizer solution 2.5 mg Q4H PRN    amiodarone tablet 400 mg Daily    atorvastatin tablet 40 mg Daily    bisacodyL suppository 10 mg Daily PRN    calcium gluconate 3 g in dextrose 5 % (D5W) 100 mL infusion Continuous     dexmedetomidine (PRECEDEX) 400mcg/100mL 0.9% NaCL infusion Continuous    dextrose 10 % infusion Continuous PRN    dextrose 10% bolus 125 mL 125 mL PRN    dextrose 10% bolus 250 mL 250 mL PRN    dextrose-sod citrate-citric ac 2.45-2.2 gram- 800 mg/100 mL Soln Continuous    DOBUtamine 1000 mg in D5W 250 mL infusion Continuous    EPINEPHrine 5 mg in dextrose 5% 250 mL infusion (premix) Continuous    ferrous gluconate tablet 324 mg Daily with breakfast    glucagon (human recombinant) injection 1 mg PRN    heparin 25,000 units in dextrose 5% 250 mL (100 units/mL) infusion (heparin infusion - NO NOMOGRAM) Continuous    insulin aspart U-100 pen 0-10 Units Q4H PRN    insulin aspart U-100 pen 5 Units 6 times per day    magnesium hydroxide 400 mg/5 ml suspension 2,400 mg Daily PRN    magnesium sulfate 2g in water 50mL IVPB (premix) PRN    midodrine tablet 15 mg TID    niCARdipine 40 mg/200 mL (0.2 mg/mL) infusion Continuous    nitric oxide gas Gas 5 ppm Continuous    NORepinephrine 4 mg in dextrose 5% 250 mL infusion (premix) Continuous    pantoprazole injection 40 mg BID    piperacillin-tazobactam (ZOSYN) 4.5 g in dextrose 5 % in water (D5W) 100 mL IVPB (MB+) Q8H    polyethylene glycol packet 17 g BID    potassium chloride 20 mEq in 100 mL IVPB (FOR CENTRAL LINE ADMINISTRATION ONLY) PRN    potassium chloride 20 mEq in 100 mL IVPB (FOR CENTRAL LINE ADMINISTRATION ONLY) PRN    QUEtiapine tablet 50 mg Q6H PRN    senna-docusate 8.6-50 mg per tablet 2 tablet BID    sodium chloride 0.9% flush 10 mL PRN    sodium phosphate 20.01 mmol in dextrose 5 % (D5W) 250 mL IVPB PRN    sodium phosphate 30 mmol in dextrose 5 % (D5W) 250 mL IVPB PRN    sodium phosphate 39.99 mmol in dextrose 5 % (D5W) 250 mL IVPB PRN    vasopressin (PITRESSIN) 0.2 Units/mL in dextrose 5 % (D5W) 100 mL infusion Continuous    warfarin (COUMADIN) tablet 2 mg Daily       Objective:     Vital Signs (Most Recent):  Temp: 97.6 °F (36.4 °C) (12/09/23 1530)  Pulse: 109  (12/09/23 1900)  Resp: 18 (12/08/23 2305)  BP: (!) 78/0 (12/09/23 1200)  SpO2: 100 % (12/09/23 1530) Vital Signs (24h Range):  Temp:  [97.6 °F (36.4 °C)-99.3 °F (37.4 °C)] 97.6 °F (36.4 °C)  Pulse:  [] 109  Resp:  [18] 18  SpO2:  [98 %-100 %] 100 %  BP: (78-80)/(0) 78/0  Arterial Line BP: (72-96)/(54-72) 85/62     Weight: 87.1 kg (192 lb) (12/08/23 1420)  Body mass index is 26.04 kg/m².  Body surface area is 2.1 meters squared.    I/O last 3 completed shifts:  In: 9661 [I.V.:2676.8; NG/GT:1050; IV Piggyback:5934.3]  Out: 87963 [Other:09306]    Physical Exam  Vitals and nursing note reviewed.   Constitutional:       General: He is not in acute distress.     Appearance: He is ill-appearing. He is not diaphoretic.      Interventions: He is sedated and intubated.   HENT:      Head: Normocephalic and atraumatic.      Right Ear: External ear normal.      Left Ear: External ear normal.      Nose:      Comments: NG tube to left nostril.     Mouth/Throat:      Comments: ET tube in place.  Eyes:      General: No scleral icterus.        Right eye: No discharge.         Left eye: No discharge.      Conjunctiva/sclera: Conjunctivae normal.   Neck:      Comments: Trialysis catheter to left internal jugular vein. [place] Siva catheter in place.  Cardiovascular:      Rate and Rhythm: Normal rate.      Heart sounds: Murmur heard.      Systolic murmur is present.      No friction rub. No gallop.   Pulmonary:      Effort: Tachypnea present. He is intubated.      Breath sounds: Rales present. No wheezing.      Comments: Bibasilar crackles appreciated.   Chest:      Comments: Two chest tubes in place with LVAD present. Chest remains open at this time.  Abdominal:      General: Bowel sounds are normal. There is no distension.      Palpations: Abdomen is soft.   Genitourinary:     Comments: Hampton catheter in place.  Musculoskeletal:      Cervical back: Neck supple.      Right lower leg: No edema.      Left lower leg: No edema.    Skin:     General: Skin is warm and dry.      Coloration: Skin is not jaundiced.   Neurological:      Mental Status: He is confused.      Comments: Unable to fully assess.   Psychiatric:      Comments: Unable to fully assess at this time.          Significant Labs:  ABGs:   Recent Labs   Lab 12/09/23 1915   PH 7.487*   PCO2 35.2   HCO3 26.6   POCSATURATED 100   BE 3*       BMP:   Recent Labs   Lab 12/09/23 1417 12/09/23 1734   *  --      --    K 4.2 4.2     --    CO2 24  --    BUN 7*  --    CREATININE 0.7  --    CALCIUM 8.3*  --    MG 1.9 1.8       CBC:   Recent Labs   Lab 12/09/23 0323   WBC 10.13   RBC 2.49*   HGB 7.1*   HCT 21.2*      MCV 85   MCH 28.5   MCHC 33.5       CMP:   Recent Labs   Lab 12/09/23 0323 12/09/23  1145 12/09/23 1417 12/09/23 1734   *  --  182*  --    CALCIUM 8.1*  --  8.3*  --    ALBUMIN 2.0*  --  2.1*  --    PROT 6.2  --   --   --      --  142  --    K 4.2   < > 4.2 4.2   CO2 24  --  24  --      --  106  --    BUN 8  --  7*  --    CREATININE 0.8  --  0.7  --    ALKPHOS 147*  --   --   --    ALT 28  --   --   --    AST 44*  --   --   --    BILITOT 0.7  --   --   --     < > = values in this interval not displayed.       Coagulation:   Recent Labs   Lab 12/09/23 1734   INR 1.1   APTT 43.8*       LFTs:   Recent Labs   Lab 12/09/23 0323 12/09/23 1417   ALT 28  --    AST 44*  --    ALKPHOS 147*  --    BILITOT 0.7  --    PROT 6.2  --    ALBUMIN 2.0* 2.1*       Microbiology Results (last 7 days)       Procedure Component Value Units Date/Time    Culture, Respiratory [[id number]] Collected: 12/06/23 1537    Order Status: Completed Specimen: Respiratory from Bronchial Wash Updated: 12/08/23 1103     Respiratory Culture No growth     Gram Stain (Respiratory) Moderate WBC's     Gram Stain (Respiratory) Rare Gram negative rods    Narrative:      Bronchial Wash    AFB Culture & Smear [[id number]] Collected: 12/06/23 1532    Order Status: Completed  Specimen: Body Fluid from Lung, Right Updated: 12/07/23 2127     AFB Culture & Smear Culture in progress     AFB CULTURE STAIN No acid fast bacilli seen.    Narrative:      Bronchial Wash    Direct AFB stain [[id number]] Collected: 12/06/23 1532    Order Status: Completed Specimen: Body Fluid from Lung, Right Updated: 12/06/23 2257     Direct Acid Fast No acid fast bacilli seen.    Narrative:      Bronchial Wash    Fungus culture [[id number]] Collected: 12/06/23 1532    Order Status: Sent Specimen: Body Fluid from Lung, Right Updated: 12/06/23 1719    Gram stain [[id number]] Collected: 12/06/23 1532    Order Status: Canceled Specimen: Body Fluid from Lung, Right     Blood culture [[id number]] Collected: 11/28/23 1452    Order Status: Completed Specimen: Blood from Peripheral, Upper Arm, Right Updated: 12/03/23 1612     Blood Culture, Routine No growth after 5 days.    Blood culture [[id number]] Collected: 11/28/23 1450    Order Status: Completed Specimen: Blood from Peripheral, Forearm, Right Updated: 12/03/23 1612     Blood Culture, Routine No growth after 5 days.          Specimen (24h ago, onward)      None          Significant Imaging:  I have reviewed all imagining in the last 24 hours.  Assessment/Plan:     Renal/  Acute renal failure with acute tubular necrosis superimposed on stage 3b chronic kidney disease  - suspect acute tubular injury secondary to hypotension/cardiogenic shock likely compounded by intra-arterial contrast and anemia with urinary sediment with granular casts  - will continue with continuous SLED for metabolic clearance and volume management, on citrate to prevent clotting   - Q8H RFPs, ionized calcium and magnesium levels per SLED protocol   - renal diet/tube feeds when not NPO, volume restriction per primary team  - strict I/O's and daily weights  - keep MAP > 65 mmHg  - renally all dose medications to eGFR  - avoid nephrotoxic agents wean feasible (i.e. NSAIDs, intra-arterial contrast,  supra-therapeutic vancomycin levels, etc.)        Thank you for your consult. I will follow-up with patient. Please contact us if you have any additional questions.    Gm Redding MD  Nephrology  [facility]

## 2023-12-10 NOTE — PLAN OF CARE
Dx: Acute on chronic combined systolic and diastolic CHF, NYHA class 4    Shift Events: Gtts titrated to maintain MAPs 70-80.  Heparin gtt remains at 600units/hr per Dr. Washington. CRRT, SLED continuous, UF increased as tolerated.  See CRRT flowsheets.    Electrolytes replaced. Melatonin given for insomnia without relief, MD notified and gabapentin ordered and given.  Emotional support/positive reinforcement provided.  All questions/concerns addressed.     Neuro: Follows commands, moves all extremities     Cards: LVAD HM3 Speed 4900. See flowsheets.     Respiratory: NC with 5 Ketty    Vital Signs: BP (!) 90/0 (BP Location: Left arm, Patient Position: Lying)   Pulse 96   Temp 97.7 °F (36.5 °C) (Oral)   Resp 18   Ht 6' (1.829 m)   Wt 87.1 kg (192 lb)   SpO2 99% Comment: per ABG  BMI 26.04 kg/m²     Diet: NPO and Tube Feeds    Gtts: Norepinephrine, Vasopressin, Epinephrine, Dobutamine, and Heparin    Urine Output: Anuric 0 cc/shift    Labs/Accuchecks: Q8 Renal/Mg, Q8 ABG with lactate, Q6 PT/INR/Fibrinogen, Q4 accuchecks.    Skin: Heel boots on, patient on immerse bed.  Weight shifting Q2hrs. No new skin break down noted.

## 2023-12-10 NOTE — ASSESSMENT & PLAN NOTE
Neuro/Psych:   -- Sedation: none; seroquel 50 Q6 PRN for agitation  -- Pain: d/c all narcotics  -- gabapentin 300mg QHS  -- psych consulted              Cardiac:   -- S/P redo sternotomy LVAD with Dr. Washington on 12/01, chest closed 12/4  -- MAP Goal: 70-80  -- Cardene PRN  -- Pressors: vaso @ 0.04 units/min, epi @ 0.04mcg/kg/min, levo @ 0.04mcg/kg/min  -- midodrine 15 TID  -- Anti-HTNs: none  -- Rhythm: NSR  -- amiodarone 400mg daily  -- LVAD heartmate III: RPM 4800, flow 3.6  -- Statin: Atorvastatin 40 mg QD  -- No ASA at this time  -- heparin 1000/hr, PTT goal 45-54  -- coumadin 2mg      Pulmonary:   -- Goal SpO2 >92%  -- supplemental O2 as needed      Renal:  -- Trend BUN/Cr   -- Maintain [place], record strict Is/Os  -- on SLED   -- net -500cc/24 hrs goal    Recent Labs   Lab 12/09/23  1417 12/09/23  2156 12/10/23  0406   BUN 7* 6* 6*   CREATININE 0.7 0.7 0.7        FEN / GI:   -- Daily CMP, PRN K/Mag/Phos per protocol   -- Replace electrolytes as needed  -- Nutrition: TF at goal 40/hr, having BM  -- Bowel Regimen: Miralax, docusate  -- SLP consult       ID:   -- Afebrile  -- WBC stable  -- Abx: zosyn 4.5g Q8H x5 days    Recent Labs   Lab 12/08/23  0408 12/09/23  0323 12/10/23  0406   WBC 10.42 10.13 17.57*           Heme/Onc:   -- Hgb 8.7 pre-operatively  -- received 7 RBC, 2 FFP, 1 platelet, 10 cry intra-op  -- 12/1: 1 RBC, 4 FFP, 2 plts, 1 cryo  -- q4 CBC and coags  -- q4 fibrinogen - keep fibrinogen >300  -- holding ASA, on coumadin       Recent Labs   Lab 12/08/23  0408 12/08/23  0716 12/09/23  0323 12/09/23  0813 12/09/23  1734 12/09/23  2321 12/10/23  0406   HGB 6.3*  --  7.1*  --   --   --  6.6*     --  231  --   --   --  253   APTT 34.3*   < >  --    < > 43.8* 31.3 30.2   INR 1.1   < > 1.1   < > 1.1 1.1 1.1    < > = values in this interval not displayed.           Endocrine:   -- CTS Goal -140  -- HgbA1c: 7.6  -- Endocrinology consulted for insulin management      PPx:   Feeding:TF at  goal  Analgesia/Sedation: seroquel 50 Q6  Thromboembolic Prevention: SCDs  HOB >30: Yes  Stress Ulcer: pantoprazole   Glucose Control: Yes, insulin management per Endocrinology  Bowel reg: Miralax, docusate  Invasive Lines/Drains/Airway:   OGT  Left radial arterial line    PICC line   LVAD     Dispo/Code Status/Palliative:     - Continue SICU Care    - Full Code

## 2023-12-10 NOTE — NURSING
Restarted heparin at 600. Will pause to remove [facility] trialysis line when dialysis RN moves dialysis to new [facility] trialysis.

## 2023-12-10 NOTE — PT/OT/SLP PROGRESS
Physical Therapy      Patient Name:  Radha Abbott   MRN:  [id number]    Patient not seen today secondary to not appropriate for skilled physical therapy today. Continuous SLED with 3 pressors. Will recheck status 12/11.    12/10/2023

## 2023-12-10 NOTE — PT/OT/SLP PROGRESS
Occupational Therapy      Patient Name:  Radha Abbott   MRN:  [id number]    Patient not seen today secondary to Other (on continuous SLED + 3 pressors, not appropriate to safely participate with therapy). Will follow-up 12/11/23.    12/10/2023

## 2023-12-10 NOTE — ASSESSMENT & PLAN NOTE
Pt with known ICM with an EF of 25% on home  presented with decompensated HF.  Not in cardiogenic shock    RHC 11/14: RA 14, PA 70/35, PCWP 32, CO 4.1, CI 2.1.   Repeat RHC 11/20 RA RA  20  PA 60/29 (39)  PCWP 29    CO  4.6 l/min  CI  2.3 l/min/m2  2D echo 11/21 showed EF 15-20%, mild RV dysfunction, mild MR, LVEDD 6.9     Repeat on 11/24 showed EF 10-15%, RV mildly reduced, mild-mod MR, LVEDD 6.42  - Home  gtt at 5  - home GDMT: entresto 24/26mg BID (on hold 2/2 IVÁN), Hydralazine 25 q8h - held prior to LVAD implant   - home diuretics: Was taking lasix 40 bid  - Bridged to LVAD with IABP s/p HM3 implant 12/1        -PT/OT and nutrition   -s/p LVAD (12/1) Speed increased on 12/7 to 4900 RPMs  - Remains on  5, epi 0.01, Ketty off  - CTS primary

## 2023-12-10 NOTE — NURSING
MD Washington notified of pressor requirements, and an order for norepi was placed to start at 0.04.

## 2023-12-10 NOTE — SUBJECTIVE & OBJECTIVE
Interval History: Patient seen and examined this AM. Clotted overnight on SLED   Increase ca to 20 ml /hr  Citrate 150 ml/hr    Review of patient's allergies indicates:  No Known Allergies  Current Facility-Administered Medications   Medication Frequency    0.9%  NaCl infusion (for blood administration) Q24H PRN    0.9%  NaCl infusion Continuous    acetaminophen tablet 650 mg Q6H PRN    albumin human 5% bottle 25 g PRN    albuterol sulfate nebulizer solution 2.5 mg Q4H PRN    amiodarone tablet 400 mg Daily    atorvastatin tablet 40 mg Daily    bisacodyL suppository 10 mg Daily PRN    calcium gluconate 3 g in dextrose 5 % (D5W) 100 mL infusion Continuous    dexmedetomidine (PRECEDEX) 400mcg/100mL 0.9% NaCL infusion Continuous    dextrose 10 % infusion Continuous PRN    dextrose 10% bolus 125 mL 125 mL PRN    dextrose 10% bolus 250 mL 250 mL PRN    dextrose-sod citrate-citric ac 2.45-2.2 gram- 800 mg/100 mL Soln Continuous    DOBUtamine 1000 mg in D5W 250 mL infusion Continuous    EPINEPHrine 5 mg in dextrose 5% 250 mL infusion (premix) Continuous    ferrous gluconate tablet 324 mg Daily with breakfast    glucagon (human recombinant) injection 1 mg PRN    heparin 25,000 units in dextrose 5% 250 mL (100 units/mL) infusion (heparin infusion - NO NOMOGRAM) Continuous    insulin aspart U-100 pen 0-10 Units Q4H PRN    insulin aspart U-100 pen 5 Units 6 times per day    magnesium hydroxide 400 mg/5 ml suspension 2,400 mg Daily PRN    magnesium sulfate 2g in water 50mL IVPB (premix) PRN    midodrine tablet 15 mg TID    niCARdipine 40 mg/200 mL (0.2 mg/mL) infusion Continuous    nitric oxide gas Gas 5 ppm Continuous    NORepinephrine 4 mg in dextrose 5% 250 mL infusion (premix) Continuous    pantoprazole injection 40 mg BID    piperacillin-tazobactam (ZOSYN) 4.5 g in dextrose 5 % in water (D5W) 100 mL IVPB (MB+) Q8H    polyethylene glycol packet 17 g BID    potassium chloride 20 mEq in 100 mL IVPB (FOR CENTRAL LINE  ADMINISTRATION ONLY) PRN    potassium chloride 20 mEq in 100 mL IVPB (FOR CENTRAL LINE ADMINISTRATION ONLY) PRN    QUEtiapine tablet 50 mg Q6H PRN    senna-docusate 8.6-50 mg per tablet 2 tablet BID    sodium chloride 0.9% flush 10 mL PRN    sodium phosphate 20.01 mmol in dextrose 5 % (D5W) 250 mL IVPB PRN    sodium phosphate 30 mmol in dextrose 5 % (D5W) 250 mL IVPB PRN    sodium phosphate 39.99 mmol in dextrose 5 % (D5W) 250 mL IVPB PRN    vasopressin (PITRESSIN) 0.2 Units/mL in dextrose 5 % (D5W) 100 mL infusion Continuous    warfarin (COUMADIN) tablet 2 mg Daily       Objective:     Vital Signs (Most Recent):  Temp: 97.6 °F (36.4 °C) (12/09/23 1530)  Pulse: 109 (12/09/23 1900)  Resp: 18 (12/08/23 2305)  BP: (!) 78/0 (12/09/23 1200)  SpO2: 100 % (12/09/23 1530) Vital Signs (24h Range):  Temp:  [97.6 °F (36.4 °C)-99.3 °F (37.4 °C)] 97.6 °F (36.4 °C)  Pulse:  [] 109  Resp:  [18] 18  SpO2:  [98 %-100 %] 100 %  BP: (78-80)/(0) 78/0  Arterial Line BP: (72-96)/(54-72) 85/62     Weight: 87.1 kg (192 lb) (12/08/23 1420)  Body mass index is 26.04 kg/m².  Body surface area is 2.1 meters squared.    I/O last 3 completed shifts:  In: 9661 [I.V.:2676.8; NG/GT:1050; IV Piggyback:5934.3]  Out: 68636 [Other:23339]     Physical Exam  Vitals and nursing note reviewed.   Constitutional:       General: He is not in acute distress.     Appearance: He is ill-appearing. He is not diaphoretic.      Interventions: He is sedated and intubated.   HENT:      Head: Normocephalic and atraumatic.      Right Ear: External ear normal.      Left Ear: External ear normal.      Nose:      Comments: NG tube to left nostril.     Mouth/Throat:      Comments: ET tube in place.  Eyes:      General: No scleral icterus.        Right eye: No discharge.         Left eye: No discharge.      Conjunctiva/sclera: Conjunctivae normal.   Neck:      Comments: Trialysis catheter to left internal jugular vein. [place] Siva catheter in place.  Cardiovascular:       Rate and Rhythm: Normal rate.      Heart sounds: Murmur heard.      Systolic murmur is present.      No friction rub. No gallop.   Pulmonary:      Effort: Tachypnea present. He is intubated.      Breath sounds: Rales present. No wheezing.      Comments: Bibasilar crackles appreciated.   Chest:      Comments: Two chest tubes in place with LVAD present. Chest remains open at this time.  Abdominal:      General: Bowel sounds are normal. There is no distension.      Palpations: Abdomen is soft.   Genitourinary:     Comments: [place] catheter in place.  Musculoskeletal:      Cervical back: Neck supple.      Right lower leg: No edema.      Left lower leg: No edema.   Skin:     General: Skin is warm and dry.      Coloration: Skin is not jaundiced.   Neurological:      Mental Status: He is confused.      Comments: Unable to fully assess.   Psychiatric:      Comments: Unable to fully assess at this time.          Significant Labs:  ABGs:   Recent Labs   Lab 12/09/23  1915   PH 7.487*   PCO2 35.2   HCO3 26.6   POCSATURATED 100   BE 3*       BMP:   Recent Labs   Lab 12/09/23  1417 12/09/23  1734   *  --      --    K 4.2 4.2     --    CO2 24  --    BUN 7*  --    CREATININE 0.7  --    CALCIUM 8.3*  --    MG 1.9 1.8       CBC:   Recent Labs   Lab 12/09/23 0323   WBC 10.13   RBC 2.49*   HGB 7.1*   HCT 21.2*      MCV 85   MCH 28.5   MCHC 33.5       CMP:   Recent Labs   Lab 12/09/23  0323 12/09/23  1145 12/09/23  1417 12/09/23  1734   *  --  182*  --    CALCIUM 8.1*  --  8.3*  --    ALBUMIN 2.0*  --  2.1*  --    PROT 6.2  --   --   --      --  142  --    K 4.2   < > 4.2 4.2   CO2 24  --  24  --      --  106  --    BUN 8  --  7*  --    CREATININE 0.8  --  0.7  --    ALKPHOS 147*  --   --   --    ALT 28  --   --   --    AST 44*  --   --   --    BILITOT 0.7  --   --   --     < > = values in this interval not displayed.       Coagulation:   Recent Labs   Lab 12/09/23  1734   INR 1.1   APTT  43.8*       LFTs:   Recent Labs   Lab 12/09/23  0323 12/09/23  1417   ALT 28  --    AST 44*  --    ALKPHOS 147*  --    BILITOT 0.7  --    PROT 6.2  --    ALBUMIN 2.0* 2.1*       Microbiology Results (last 7 days)       Procedure Component Value Units Date/Time    Culture, Respiratory [[id number]] Collected: 12/06/23 1531    Order Status: Completed Specimen: Respiratory from Bronchial Wash Updated: 12/08/23 1103     Respiratory Culture No growth     Gram Stain (Respiratory) Moderate WBC's     Gram Stain (Respiratory) Rare Gram negative rods    Narrative:      Bronchial Wash    AFB Culture & Smear [[id number]] Collected: 12/06/23 1532    Order Status: Completed Specimen: Body Fluid from Lung, Right Updated: 12/07/23 2127     AFB Culture & Smear Culture in progress     AFB CULTURE STAIN No acid fast bacilli seen.    Narrative:      Bronchial Wash    Direct AFB stain [[id number]] Collected: 12/06/23 1532    Order Status: Completed Specimen: Body Fluid from Lung, Right Updated: 12/06/23 2257     Direct Acid Fast No acid fast bacilli seen.    Narrative:      Bronchial Wash    Fungus culture [[id number]] Collected: 12/06/23 1532    Order Status: Sent Specimen: Body Fluid from Lung, Right Updated: 12/06/23 1719    Gram stain [[id number]] Collected: 12/06/23 1532    Order Status: Canceled Specimen: Body Fluid from Lung, Right     Blood culture [[id number]] Collected: 11/28/23 1452    Order Status: Completed Specimen: Blood from Peripheral, Upper Arm, Right Updated: 12/03/23 1612     Blood Culture, Routine No growth after 5 days.    Blood culture [[id number]] Collected: 11/28/23 1450    Order Status: Completed Specimen: Blood from Peripheral, Forearm, Right Updated: 12/03/23 1612     Blood Culture, Routine No growth after 5 days.          Specimen (24h ago, onward)      None          Significant Imaging:  I have reviewed all imagining in the last 24 hours.

## 2023-12-10 NOTE — PROGRESS NOTES
[facility]  Nephrology  Progress Note    Patient Name: Radha Abbott  MRN: [id number]  Admission Date: 11/9/2023  Hospital Length of Stay: 31 days  Attending Provider: Yuri Washington MD   Primary Care Physician: Vasu Kong MD  Principal Problem:Acute on chronic combined systolic and diastolic CHF, NYHA class 4    Subjective:     HPI: Mr. Abbott is a 63-year-old man with ischemic cardiomyopathy with most recent TTE showing EF 10 -15% and G3DD s/p [employer] ICM placement on chronic dobutamine infusion, CAD s/p x3 vessel CABG back in 2009, type 2 diabetes mellitus (most recent hemoglobin A1c 7.6%), hypertension, HLD, history of ventricular fibrillation for which he is on amiodarone and CKD IIIb (baseline creatinine ~2-2.2) who was initially admitted on 11/9 with heart failure exacerbation and hypervolemia for which he was managed with inotrope with dobutamine in addition to IV diuresis. He ultimately underwent IABP placed on 11/21 for mechanical hemodynamic support and subsequently underwent LVAD placement on 12/1. His renal function appears to be mostly stable with IVÁN and creatinine in mid 2s to 3s in addition he is still making urine with Lasix infusion however on 12/5 Nephrology consulted given concern for uremia with BUN 72.     Interval History: Patient seen and examined this AM. Increase ca to 30 ml /hr  Citrate 100 ml/hr  morning H&H 6.6&21.1   Review of patient's allergies indicates:  No Known Allergies  Current Facility-Administered Medications   Medication Frequency    0.9%  NaCl infusion (for blood administration) Q24H PRN    0.9%  NaCl infusion Continuous    acetaminophen tablet 650 mg Q6H PRN    albumin human 5% bottle 25 g PRN    albuterol sulfate nebulizer solution 2.5 mg Q4H PRN    amiodarone tablet 400 mg Daily    atorvastatin tablet 40 mg Daily    bisacodyL suppository 10 mg Daily PRN    calcium gluconate 3 g in dextrose 5 % (D5W) 100 mL infusion Continuous     dexmedetomidine (PRECEDEX) 400mcg/100mL 0.9% NaCL infusion Continuous    dextrose 10 % infusion Continuous PRN    dextrose 10% bolus 125 mL 125 mL PRN    dextrose 10% bolus 250 mL 250 mL PRN    dextrose-sod citrate-citric ac 2.45-2.2 gram- 800 mg/100 mL Soln Continuous    DOBUtamine 1000 mg in D5W 250 mL infusion Continuous    EPINEPHrine 5 mg in dextrose 5% 250 mL infusion (premix) Continuous    ferrous gluconate tablet 324 mg Daily with breakfast    gabapentin capsule 300 mg QHS    glucagon (human recombinant) injection 1 mg PRN    heparin 25,000 units in dextrose 5% 250 mL (100 units/mL) infusion (heparin infusion - NO NOMOGRAM) Continuous    insulin aspart U-100 pen 0-10 Units Q4H PRN    insulin aspart U-100 pen 5 Units 6 times per day    magnesium hydroxide 400 mg/5 ml suspension 2,400 mg Daily PRN    magnesium sulfate 2g in water 50mL IVPB (premix) PRN    midodrine tablet 15 mg TID    niCARdipine 40 mg/200 mL (0.2 mg/mL) infusion Continuous    nitric oxide gas Gas 5 ppm Continuous    NORepinephrine 4 mg in dextrose 5% 250 mL infusion (premix) Continuous    pantoprazole injection 40 mg BID    piperacillin-tazobactam (ZOSYN) 4.5 g in dextrose 5 % in water (D5W) 100 mL IVPB (MB+) Q8H    polyethylene glycol packet 17 g BID    potassium chloride 20 mEq in 100 mL IVPB (FOR CENTRAL LINE ADMINISTRATION ONLY) PRN    potassium chloride 20 mEq in 100 mL IVPB (FOR CENTRAL LINE ADMINISTRATION ONLY) PRN    QUEtiapine tablet 50 mg Q6H PRN    senna-docusate 8.6-50 mg per tablet 2 tablet BID    sodium chloride 0.9% flush 10 mL PRN    sodium phosphate 20.01 mmol in dextrose 5 % (D5W) 250 mL IVPB PRN    sodium phosphate 30 mmol in dextrose 5 % (D5W) 250 mL IVPB PRN    sodium phosphate 39.99 mmol in dextrose 5 % (D5W) 250 mL IVPB PRN    vasopressin (PITRESSIN) 0.2 Units/mL in dextrose 5 % (D5W) 100 mL infusion Continuous    warfarin (COUMADIN) tablet 2 mg Daily       Objective:     Vital Signs (Most Recent):  Temp: 97.6 °F (36.4  °C) (12/10/23 0730)  Pulse: 97 (12/10/23 0730)  Resp: 18 (12/08/23 2305)  BP: (!) 90/0 (12/09/23 2300)  SpO2: 99 % (12/10/23 0345) Vital Signs (24h Range):  Temp:  [97.6 °F (36.4 °C)-98.2 °F (36.8 °C)] 97.6 °F (36.4 °C)  Pulse:  [] 97  SpO2:  [98 %-100 %] 99 %  BP: (78-90)/(0) 90/0  Arterial Line BP: (72-92)/(54-70) 81/62     Weight: 78 kg (172 lb) (12/10/23 0509)  Body mass index is 23.33 kg/m².  Body surface area is 1.99 meters squared.    I/O last 3 completed shifts:  In: 51640.6 [I.V.:3013.9; NG/GT:1090; IV Piggyback:6655.7]  Out: 43066 [Other:95515]    Physical Exam  Vitals and nursing note reviewed.   Constitutional:       General: He is not in acute distress.     Appearance: He is ill-appearing. He is not diaphoretic.      Interventions: He is sedated and intubated.   HENT:      Head: Normocephalic and atraumatic.      Right Ear: External ear normal.      Left Ear: External ear normal.      Nose:      Comments: NG tube to left nostril.     Mouth/Throat:      Comments: ET tube in place.  Eyes:      General: No scleral icterus.        Right eye: No discharge.         Left eye: No discharge.      Conjunctiva/sclera: Conjunctivae normal.   Neck:      Comments: Trialysis catheter to left internal jugular vein. [place] Siva catheter in place.  Cardiovascular:      Rate and Rhythm: Normal rate.      Heart sounds: Murmur heard.      Systolic murmur is present.      No friction rub. No gallop.   Pulmonary:      Effort: Tachypnea present. He is intubated.      Breath sounds: Rales present. No wheezing.      Comments: Bibasilar crackles appreciated.   Chest:      Comments: Two chest tubes in place with LVAD present. Chest remains open at this time.  Abdominal:      General: Bowel sounds are normal. There is no distension.      Palpations: Abdomen is soft.   Genitourinary:     Comments: [place] catheter in place.  Musculoskeletal:      Cervical back: Neck supple.      Right lower leg: No edema.      Left lower leg: No  edema.   Skin:     General: Skin is warm and dry.      Coloration: Skin is not jaundiced.   Neurological:      Mental Status: He is confused.      Comments: Unable to fully assess.   Psychiatric:      Comments: Unable to fully assess at this time.          Significant Labs:  ABGs:   Recent Labs   Lab 12/10/23  0309   PH 7.483*   PCO2 31.2*   HCO3 23.4*   POCSATURATED 99   BE 0       BMP:   Recent Labs   Lab 12/10/23  0406   *      K 4.4      CO2 20*   BUN 6*   CREATININE 0.7   CALCIUM 8.5*   MG 1.9       CBC:   Recent Labs   Lab 12/10/23  0406   WBC 17.57*   RBC 2.35*   HGB 6.6*   HCT 21.1*      MCV 90   MCH 28.1   MCHC 31.3*       CMP:   Recent Labs   Lab 12/10/23  0406   *   CALCIUM 8.5*   ALBUMIN 2.1*   PROT 6.1      K 4.4   CO2 20*      BUN 6*   CREATININE 0.7   ALKPHOS 145*   ALT 26   AST 55*   BILITOT 1.0       Coagulation:   Recent Labs   Lab 12/10/23  0406   INR 1.1   APTT 30.2       LFTs:   Recent Labs   Lab 12/10/23  0406   ALT 26   AST 55*   ALKPHOS 145*   BILITOT 1.0   PROT 6.1   ALBUMIN 2.1*       Microbiology Results (last 7 days)       Procedure Component Value Units Date/Time    Culture, Respiratory [[id number]] Collected: 12/06/23 1531    Order Status: Completed Specimen: Respiratory from Bronchial Wash Updated: 12/08/23 1103     Respiratory Culture No growth     Gram Stain (Respiratory) Moderate WBC's     Gram Stain (Respiratory) Rare Gram negative rods    Narrative:      Bronchial Wash    AFB Culture & Smear [[id number]] Collected: 12/06/23 1532    Order Status: Completed Specimen: Body Fluid from Lung, Right Updated: 12/07/23 2127     AFB Culture & Smear Culture in progress     AFB CULTURE STAIN No acid fast bacilli seen.    Narrative:      Bronchial Wash    Direct AFB stain [[id number]] Collected: 12/06/23 1532    Order Status: Completed Specimen: Body Fluid from Lung, Right Updated: 12/06/23 2257     Direct Acid Fast No acid fast bacilli seen.     Narrative:      Bronchial Wash    Fungus culture [[id number]] Collected: 12/06/23 1532    Order Status: Sent Specimen: Body Fluid from Lung, Right Updated: 12/06/23 1719    Gram stain [[id number]] Collected: 12/06/23 1532    Order Status: Canceled Specimen: Body Fluid from Lung, Right     Blood culture [[id number]] Collected: 11/28/23 1452    Order Status: Completed Specimen: Blood from Peripheral, Upper Arm, Right Updated: 12/03/23 1612     Blood Culture, Routine No growth after 5 days.    Blood culture [[id number]] Collected: 11/28/23 1450    Order Status: Completed Specimen: Blood from Peripheral, Forearm, Right Updated: 12/03/23 1612     Blood Culture, Routine No growth after 5 days.          Specimen (24h ago, onward)      None          Significant Imaging:  I have reviewed all imagining in the last 24 hours.  Assessment/Plan:     Cardiac/Vascular  LVAD (left ventricular assist device) present  -    Pre-transplant evaluation for heart transplant  -    Renal/  Acute renal failure with acute tubular necrosis superimposed on stage 3b chronic kidney disease  - suspect acute tubular injury secondary to hypotension/cardiogenic shock likely compounded by intra-arterial contrast and anemia with urinary sediment with granular casts    Total intake in 24 hr shift is 6.7 L Total Output 7.147, Net negative 401  - will continue with continuous SLED for metabolic clearance and volume management, on citrate to prevent clotting   -iCA 0.96 Increased iCa bath to 2.5,   -Citrate decreased to 100 ml/hr  -Ca gluconate 3 g increased to 30 ml /hr  -Please repeat iCa Level after 4 hrs will adjust calcium bath accordingly  - Q8H RFPs,  and magnesium levels per SLED protocol   - renal diet/tube feeds when not NPO, volume restriction per primary team  - strict I/O's and daily weights  - keep MAP > 65 mmHg  - renally all dose medications to eGFR  - avoid nephrotoxic agents wean feasible (i.e. NSAIDs, intra-arterial contrast,  supra-therapeutic vancomycin levels, etc.)        Thank you for your consult. I will follow-up with patient. Please contact us if you have any additional questions.    Gm Redding MD  Nephrology  [facility]

## 2023-12-10 NOTE — CARE UPDATE
-Glucose Goal 140-180    -A1C:   Hemoglobin A1C   Date Value Ref Range Status   10/12/2023 7.6 (H) 4.0 - 5.6 % Final     Comment:     ADA Screening Guidelines:  5.7-6.4%  Consistent with prediabetes  >or=6.5%  Consistent with diabetes    High levels of fetal hemoglobin interfere with the HbA1C  assay. Heterozygous hemoglobin variants (HbS, HgC, etc)do  not significantly interfere with this assay.   However, presence of multiple variants may affect accuracy.           -HOME REGIMEN: Metformin (off since October)      -GLUCOSE TREND FOR THE PAST 24HRS:   Recent Labs   Lab 12/09/23  1147 12/09/23  1551 12/09/23  1916 12/09/23  2320 12/10/23  0405 12/10/23  0827   POCTGLUCOSE 214* 184* 134* 173* 207* 186*         -NO HYPOGYCEMIAS NOTED     - Diet  Diet NPO Except for: Medication (per NG tube)    -Steroids - none     -Tube Feeds - TF @ 40 mL         Plan:   - 7 units Novolog q 4 hr to cover TF   - MDC (150/50) prn q 4 hr   - POCT Glucose q 4 hr   - Hypoglycemia protocol in place      ** Please notify Endocrine for any change and/or advance in diet**  ** Please call Endocrine for any BG related issues **     Discharge Planning:   TBD. Please notify endocrinology prior to discharge.

## 2023-12-10 NOTE — ASSESSMENT & PLAN NOTE
-multifactorial  -likely metabolic encephalopathy +/- icu delirium   -CTH 12/7 negative for acute process  -Continue RRT for metabolic clearance  -Wean sedation as able  -EEG done yesterday, no seizures detected, just generalized slowing  -provide frequent re-orientation  -Promote sleep/wake cycle   -Neurology consulted   - Much more awake this AM, continue to encourage and motivate

## 2023-12-10 NOTE — ASSESSMENT & PLAN NOTE
- suspect acute tubular injury secondary to hypotension/cardiogenic shock likely compounded by intra-arterial contrast and anemia with urinary sediment with granular casts    Total intake in 24 hr shift is 6.7 L Total Output 7.147, Net negative 401  - will continue with continuous SLED for metabolic clearance and volume management, on citrate to prevent clotting   -iCA 0.96 Increased iCa bath to 2.5,   -Citrate decreased to 100 ml/hr  -Ca gluconate 3 g increased to 30 ml /hr  -Please repeat iCa Level after 4 hrs will adjust calcium bath accordingly  - Q8H RFPs,  and magnesium levels per SLED protocol   - renal diet/tube feeds when not NPO, volume restriction per primary team  - strict I/O's and daily weights  - keep MAP > 65 mmHg  - renally all dose medications to eGFR  - avoid nephrotoxic agents wean feasible (i.e. NSAIDs, intra-arterial contrast, supra-therapeutic vancomycin levels, etc.)

## 2023-12-10 NOTE — PROGRESS NOTES
"Melatonin given as ordered for insomnia without relief.  Patient awake in bed, now stating he "wants to die" and is "tired of suffering."  Emotional support and positive reinforcement provided.  Dr. Padilla notified and at bedside. Patients states he takes gabapentin at home for sleep.  Gabapentin ordered and given.  Psych consult placed this morning by Dr. Padilla.  "

## 2023-12-10 NOTE — ASSESSMENT & PLAN NOTE
-HeartMate 3 Implanted  12/1/2023  as DT  -CTS Primary  -Implanted by Dr. Washington  -HOLD Coumadin,  Goal INR 2.0-3.0 . Subtherapeutic today. On Heparin gtt   -Antiplatelets Not on ASA  -LDH is stable overall today. Will continue to monitor daily.  -Speed set at  4900   rpm, LSL 4500 rpm   -Interrogation notable for no events  -Not listed for OHTx, declined for OHTX due to comorbidities         Procedure: Device Interrogation Including analysis of device parameters  Current Settings: Ventricular Assist Device  Review of device function is stable/unstable stable        12/10/2023    10:00 AM 12/10/2023     9:00 AM 12/10/2023     8:00 AM 12/10/2023     7:00 AM 12/10/2023     6:00 AM 12/10/2023     5:00 AM 12/10/2023     4:00 AM   TXP LVAD INTERROGATIONS   Type HeartMate3 HeartMate3 HeartMate3 HeartMate3 HeartMate3 HeartMate3 HeartMate3   Flow 4 4.2 3.9 3.9 3.9 3.7 3.6   Speed 4950 4900 4900 4900 4900 4900 4900   PI 3.8 3.7 3.7 4.5 5.1 4.9 5.7   Power ([place]) 3.3 3.5 3.5 3.3 3.2 3.3 3.3   LSL 4500 4500 4500 4500 4500 4500 4500   Pulsatility Intermittent pulse Intermittent pulse Intermittent pulse Intermittent pulse Intermittent pulse Intermittent pulse Intermittent pulse

## 2023-12-10 NOTE — SUBJECTIVE & OBJECTIVE
Interval History: Patient POD #10 s/p HM3 LVAD implant. Re-extubated yesterday. Much more awake this AM, frustrated and discouraged this AM and wants to go home. CVP down to 3. Anuric on CRRT, net -I've 1.2L/24h.  Pressor requirements up some compared to yesterday: On epi .04, levo .02, vaso .04, Ketty 5.     Continuous Infusions:   sodium chloride 0.9%      sodium chloride 0.9% 5 mL/hr at 12/10/23 0700    calcium gluconate 3 g in dextrose 5 % (D5W) 100 mL infusion      dexmedeTOMIDine (Precedex) infusion (titrating) 1 mcg/kg/hr (12/09/23 1000)    dextrose 10 % in water (D10W)      dextrose-sod citrate-citric ac      DOBUTamine      EPINEPHrine 0.04 mcg/kg/min (12/10/23 0700)    heparin (porcine) in 5 % dex 600 Units/hr (12/10/23 0700)    nicardipine 5 mg/hr (12/09/23 0600)    nitric oxide gas      NORepinephrine bitartrate-D5W 0.02 mcg/kg/min (12/10/23 0700)    vasopressin 0.04 Units/min (12/10/23 0700)     Scheduled Meds:   amiodarone  400 mg Per NG tube Daily    atorvastatin  40 mg Per NG tube Daily    ferrous gluconate  324 mg Per NG tube Daily with breakfast    gabapentin  300 mg Oral QHS    insulin aspart U-100  5 Units Subcutaneous 6 times per day    midodrine  15 mg Oral TID    pantoprazole  40 mg Intravenous BID    piperacillin-tazobactam (Zosyn) IV (PEDS and ADULTS) (extended infusion is not appropriate)  4.5 g Intravenous Q8H    polyethylene glycol  17 g Per NG tube BID    senna-docusate 8.6-50 mg  2 tablet Per NG tube BID    warfarin  2 mg Per NG tube Daily     PRN Meds:0.9%  NaCl infusion (for blood administration), acetaminophen, albumin human 5%, albuterol sulfate, bisacodyL, dextrose 10 % in water (D10W), dextrose 10%, dextrose 10%, glucagon (human recombinant), insulin aspart U-100, magnesium hydroxide 400 mg/5 ml, magnesium sulfate IVPB, magnesium sulfate IVPB, potassium chloride, potassium chloride, QUEtiapine, Flushing PICC/Midline Protocol **AND** [DISCONTINUED] sodium chloride 0.9% **AND** sodium  chloride 0.9%, sodium phosphate 20.01 mmol in dextrose 5 % (D5W) 250 mL IVPB, sodium phosphate 20.01 mmol in dextrose 5 % (D5W) 250 mL IVPB, sodium phosphate 30 mmol in dextrose 5 % (D5W) 250 mL IVPB, sodium phosphate 30 mmol in dextrose 5 % (D5W) 250 mL IVPB, sodium phosphate 39.99 mmol in dextrose 5 % (D5W) 250 mL IVPB, sodium phosphate 39.99 mmol in dextrose 5 % (D5W) 250 mL IVPB    Review of patient's allergies indicates:  No Known Allergies  Objective:     Vital Signs (Most Recent):  Temp: 97.6 °F (36.4 °C) (12/10/23 0730)  Pulse: 96 (12/10/23 0830)  Resp: 18 (12/08/23 2305)  BP: (!) 90/0 (12/09/23 2300)  SpO2: 99 % (12/10/23 0345) Vital Signs (24h Range):  Temp:  [97.6 °F (36.4 °C)-98.2 °F (36.8 °C)] 97.6 °F (36.4 °C)  Pulse:  [] 96  SpO2:  [98 %-100 %] 99 %  BP: (78-90)/(0) 90/0  Arterial Line BP: (72-92)/(54-68) 81/62     Patient Vitals for the past 72 hrs (Last 3 readings):   Weight   12/10/23 0509 78 kg (172 lb)   12/08/23 1420 87.1 kg (192 lb)   12/08/23 0500 87.1 kg (192 lb)       Body mass index is 23.33 kg/m².      Intake/Output Summary (Last 24 hours) at 12/10/2023 0843  Last data filed at 12/10/2023 0710  Gross per 24 hour   Intake 6469.99 ml   Output 7204 ml   Net -734.01 ml         Hemodynamic Parameters:       Telemetry: NSR        Physical Exam  Constitutional:       Appearance: He is ill-appearing.   HENT:      Head: Normocephalic and atraumatic.   Eyes:      Pupils: Pupils are equal, round, and reactive to light.   Cardiovascular:      Rate and Rhythm: Normal rate and regular rhythm.      Comments: LVAD hum  Pulmonary:      Breath sounds: Rhonchi and rales present.   Abdominal:      General: Abdomen is flat. Bowel sounds are normal. There is no distension.      Palpations: Abdomen is soft.   Musculoskeletal:         General: Normal range of motion.      Cervical back: Neck supple.   Skin:     General: Skin is warm and dry.   Neurological:      Comments: Awake and oriented.              Significant Labs:  CBC:  Recent Labs   Lab 12/08/23  0408 12/09/23  0323 12/10/23  0309 12/10/23  0406   WBC 10.42 10.13  --  17.57*   RBC 2.16* 2.49*  --  2.35*   HGB 6.3* 7.1*  --  6.6*   HCT 19.0* 21.2* 20* 21.1*    231  --  253   MCV 88 85  --  90   MCH 29.2 28.5  --  28.1   MCHC 33.2 33.5  --  31.3*       BNP:  Recent Labs   Lab 12/04/23  0412 12/06/23  0410 12/08/23  0408   * 1,679* 695*       CMP:  Recent Labs   Lab 12/08/23  0408 12/08/23  1110 12/09/23  0323 12/09/23  1145 12/09/23  1417 12/09/23  1734 12/09/23  2156 12/10/23  0406   *   < > 219*  --  182*  --  156* 188*   CALCIUM 8.1*   < > 8.1*  --  8.3*  --  8.3* 8.5*   ALBUMIN 2.0*  2.0*   < > 2.0*  --  2.1*  --  2.1* 2.1*   PROT 5.7*  --  6.2  --   --   --   --  6.1      < > 141  --  142  --  140 139   K 4.3   < > 4.2   < > 4.2 4.2 4.5 4.4   CO2 21*   < > 24  --  24  --  20* 20*      < > 108  --  106  --  107 105   BUN 13   < > 8  --  7*  --  6* 6*   CREATININE 1.1   < > 0.8  --  0.7  --  0.7 0.7   ALKPHOS 139*  --  147*  --   --   --   --  145*   ALT 26  --  28  --   --   --   --  26   AST 55*  --  44*  --   --   --   --  55*   BILITOT 0.8  --  0.7  --   --   --   --  1.0    < > = values in this interval not displayed.        Coagulation:   Recent Labs   Lab 12/09/23  1734 12/09/23  2321 12/10/23  0406   INR 1.1 1.1 1.1   APTT 43.8* 31.3 30.2       LDH:  Recent Labs   Lab 12/08/23  0408 12/09/23  0323 12/10/23  0406   * 574* 689*       Microbiology:  Microbiology Results (last 7 days)       Procedure Component Value Units Date/Time    Culture, Respiratory [[id number]] Collected: 12/06/23 1531    Order Status: Completed Specimen: Respiratory from Bronchial Wash Updated: 12/08/23 1103     Respiratory Culture No growth     Gram Stain (Respiratory) Moderate WBC's     Gram Stain (Respiratory) Rare Gram negative rods    Narrative:      Bronchial Wash    AFB Culture & Smear [[id number]] Collected: 12/06/23 1532     Order Status: Completed Specimen: Body Fluid from Lung, Right Updated: 12/07/23 2127     AFB Culture & Smear Culture in progress     AFB CULTURE STAIN No acid fast bacilli seen.    Narrative:      Bronchial Wash    Direct AFB stain [[id number]] Collected: 12/06/23 1532    Order Status: Completed Specimen: Body Fluid from Lung, Right Updated: 12/06/23 2257     Direct Acid Fast No acid fast bacilli seen.    Narrative:      Bronchial Wash    Fungus culture [[id number]] Collected: 12/06/23 1532    Order Status: Sent Specimen: Body Fluid from Lung, Right Updated: 12/06/23 1719    Gram stain [[id number]] Collected: 12/06/23 1532    Order Status: Canceled Specimen: Body Fluid from Lung, Right     Blood culture [[id number]] Collected: 11/28/23 1452    Order Status: Completed Specimen: Blood from Peripheral, Upper Arm, Right Updated: 12/03/23 1612     Blood Culture, Routine No growth after 5 days.    Blood culture [[id number]] Collected: 11/28/23 1450    Order Status: Completed Specimen: Blood from Peripheral, Forearm, Right Updated: 12/03/23 1612     Blood Culture, Routine No growth after 5 days.            BMP:   Recent Labs   Lab 12/10/23  0406   *      K 4.4      CO2 20*   BUN 6*   CREATININE 0.7   CALCIUM 8.5*   MG 1.9       I have reviewed all pertinent labs within the past 24 hours.    Estimated Creatinine Clearance: 121.6 mL/min (based on SCr of 0.7 mg/dL).    Diagnostic Results:  I have reviewed all pertinent imaging results/findings within the past 24 hours.

## 2023-12-10 NOTE — CONSULTS
"CONSULTATION LIAISON PSYCHIATRY INITIAL EVALUATION    Patient Name: Radha Abbott  MRN: [id number]  Patient Class: IP- Inpatient  Admission Date: 11/9/2023  Attending Physician: Yuri Washington MD      HPI:   Patient is a 63-year-old man with ischemic cardiomyopathy (EF 10 -15%) and G3DD s/p [employer] ICM placement on chronic dobutamine infusion, CAD s/p x3 vessel CABG back in 2009, type 2 diabetes mellitus (most recent hemoglobin A1c 7.6%), hypertension, HLD, history of ventricular fibrillation for which he is on amiodarone and CKD IIIb (baseline creatinine ~2-2.2) who was initially admitted on 11/9 with heart failure exacerbation and hypervolemia for which he was managed with inotrope with dobutamine in addition to IV diuresis. He ultimately underwent IABP placed on 11/21 for mechanical hemodynamic support and subsequently underwent LVAD placement on 12/1. Pt has been intubated twice, now extubated since yesterday and has been voicing passive SI.    Psychiatry consulted for "Expressed desire to 'meet his maker', s/p LVAD"    On psych exam, patient laying in bed comfortably with wife at bedside. Patient is often difficult to understand, secondary to recent extubation, but is found to have linear and goal oriented thought process. He confirms that he has been having thoughts of wanting to die, which started last night. He says he started feeling this way when his wife left the hospital room for a couple hours. At multiple times throughout the interview, patient mentions his wife leaving as the reason for his SI, and when asked if he believes he would have these thoughts if she was at his side 24/7, he replies, "no." He denies any plan and states he has never had any thoughts of suicide prior to this incident last night. He voices frustration with wife for leaving his side, and at one point during the interview, when wife steps to side of room out of patient's view, he throws his hands up in frustration. He is future " oriented throughout interview, explaining that he is looking forward to fishing and riding his bike when he leaves the hospital. He reports feeling hopeful that he will get out of the hospital. By the end of interview, patient is denying active SI, but states that he may feel this way again if his wife leaves. Patient also reporting some poor sleep. Also reporting feeling down and depressed with regards to his current medical issues, but is unwilling to elaborate further. He otherwise has no further complaints. He denies HI/AVH. CAM-ICU negative. Patient is open to speaking with psychology team.     Medical Review of Systems:  Pertinent items are noted in HPI.    Psychiatric Review of Systems (is patient experiencing or having changes in):  sleep: reports poor sleep   appetite: yes  weight: no  energy/anergy: no  interest/pleasure/anhedonia: no  anxiety/panic: no  guilty/hopelessness: no  concentration: no  Airam:no  Psychosis: no  Trauma: no  S.I.B.s/risky behavior: no    Past Psychiatric History:  Previous Medication Trials: no  Previous Psychiatric Hospitalizations:no   Previous Suicide Attempts: no  History of Violence: no  Outpatient Psychiatrist: no  Family Psychiatric History: no    Substance Abuse History (with emphasis over the last 12 months):  Recreational Drugs:  denies   Use of Alcohol: occasional, social use  Tobacco Use: yes, pack a day, recently quit  Rehab History:no    Social History:  Marital Status:   Children: 4  Employment Status/Info: currently employed,    :no  Education: high school diploma/GED  Special Ed: no  Housing Status: with spouse  Access to gun: yes, discussed gun safety   Psychosocial Stressors: health  Functioning Relationships: good support system    Legal History:  Past Charges/Incarcerations: no  Pending charges:no    Mental Status Exam:  General Appearance: well-developed, dressed in hospital garb, in no acute distress, thin and gaunt  Behavior:  normal, cooperative  Involuntary Movements and Motor Activity: no abnormal involuntary movements noted; no tics, no tremors, no akathisia, no dystonia, no evidence of tardive dyskinesia; no psychomotor agitation or retardation  Gait and Station: unable to assess - patient lying down or seated  Speech and Language:  soft, hoarse voice secondary to recent extubation   Mood: euthymic  Affect: reactive, appropriate to situation and context  Thought Process and Associations: intact; linear, goal-directed, organized, and logical; no loosening of associations noted  Thought Content and Perceptions:: + passive suicidal ideation, no homicidal ideation, no hallucinations, no paranoid ideation, no persecutory delusions, no ideas of reference, no thought broadcasting, no thought insertion or withdrawal, no delusions  Sensorium and Orientation: orientation grossly intact  Recent and Remote Memory: grossly intact  Attention and Concentration: grossly intact  Fund of Knowledge: grossly intact  Insight: adequate  Judgment: adequate    CAM ICU positive? no      ASSESSMENT & RECOMMENDATIONS   Adjustment Disorder, acute, with depressed mood     Would recommend consult to psychology  No indication for medications at this time    RISK ASSESSMENT    NO NEED FOR PEC patient NOT in any imminent danger of hurting self or others and not gravely disabled.   Would recommend sitter when patient's family not at bedside for extended time     FOLLOW UP  Will follow up while in house    DISPOSITION - once medically cleared:    Defer to medical team    Please contact ON CALL psychiatry service (24/7) for any acute issues that may arise.    Dr. Vasu Riley  [facility] PGY2  [facility]  12/10/2023 9:39  AM        --------------------------------------------------------------------------------------------------------------------------------------------------------------------------------------------------------------------------------------    CONTINUED HISTORY & OBJECTIVE clinical data & findings reviewed and noted for above decision making    Past Medical/Surgical History:   Past Medical History:   Diagnosis Date    CAD (coronary artery disease)     Diabetes mellitus     HFrEF (heart failure with reduced ejection fraction)     ICD (implantable cardioverter-defibrillator) in place     MI, old      Past Surgical History:   Procedure Laterality Date    ANGIOPLASTY-VENOUS ARTERY Right 12/1/2023    Procedure: ANGIOPLASTY-VENOUS ARTERY, RIGHT FEMORAL;  Surgeon: Yuri Washington MD;  Location: [address];  Service: Cardiovascular;  Laterality: Right;    AORTIC VALVULOPLASTY N/A 12/1/2023    Procedure: REPAIR, AORTIC VALVE;  Surgeon: Yuri Washington MD;  Location: [address];  Service: Cardiovascular;  Laterality: N/A;    CARDIAC SURGERY      CLOSURE N/A 12/1/2023    Procedure: CLOSURE, TEMPORARY;  Surgeon: Yuri Washington MD;  Location: [address];  Service: Cardiovascular;  Laterality: N/A;    DRAINAGE OF PLEURAL EFFUSION  12/4/2023    Procedure: DRAINAGE, PLEURAL EFFUSION;  Surgeon: Yuri Washington MD;  Location: [address];  Service: Cardiovascular;;    INSERTION OF GRAFT TO PERICARDIUM  12/4/2023    Procedure: INSERTION, GRAFT, PERICARDIUM;  Surgeon: Yuri Washington MD;  Location: [facility] OR [facility] FLR;  Service: Cardiovascular;;    INSERTION OF INTRA-AORTIC BALLOON ASSIST DEVICE Right 11/21/2023    Procedure: INSERTION, INTRA-AORTIC BALLOON PUMP;  Surgeon: Finn Cohn MD;  Location: [facility] CATH LAB;  Service: Cardiology;  Laterality: Right;    LEFT VENTRICULAR ASSIST DEVICE Left 12/1/2023    Procedure: INSERTION-LEFT VENTRICULAR ASSIST DEVICE;  Surgeon: Yuri Washington MD;  Location: [facility] OR [facility]  FLR;  Service: Cardiovascular;  Laterality: Left;  REDO STERNOTOMY - REDO SAW NEEDED FOR CASE    LYSIS OF ADHESIONS  12/1/2023    Procedure: LYSIS, ADHESIONS;  Surgeon: Yuri Washington MD;  Location: [facility] OR 2ND FLR;  Service: Cardiovascular;;    PLACEMENT OF SWAN ROLANDO CATHETER WITH IMAGING GUIDANCE  11/20/2023    Procedure: INSERTION, CATHETER, SWAN-ROLANDO, WITH IMAGING GUIDANCE;  Surgeon: Sajan Hurley MD;  Location: [facility] CATH LAB;  Service: Cardiology;;    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 12/1/2023    Procedure: REPAIR, ANEURYSM, ARTERY, FEMORAL;  Surgeon: Yuri Washington MD;  Location: [facility] OR 2ND FLR;  Service: Cardiovascular;  Laterality: Right;  Right Femoral Artery Repair    RIGHT HEART CATHETERIZATION Right 10/10/2023    Procedure: INSERTION, CATHETER, RIGHT HEART;  Surgeon: Bin Gandhi MD;  Location: [facility] CATH LAB;  Service: Cardiology;  Laterality: Right;    RIGHT HEART CATHETERIZATION Right 10/13/2023    Procedure: INSERTION, CATHETER, RIGHT HEART;  Surgeon: Walter Mcintyre MD;  Location: [facility] CATH LAB;  Service: Cardiology;  Laterality: Right;    RIGHT HEART CATHETERIZATION  11/13/2023    RIGHT HEART CATHETERIZATION Right 11/13/2023    Procedure: INSERTION, CATHETER, RIGHT HEART;  Surgeon: Juventino Bermudez Jr., MD;  Location: [facility] CATH LAB;  Service: Cardiology;  Laterality: Right;    RIGHT HEART CATHETERIZATION Right 11/20/2023    Procedure: INSERTION, CATHETER, RIGHT HEART;  Surgeon: Sajan Hurley MD;  Location: [facility] CATH LAB;  Service: Cardiology;  Laterality: Right;    STERNAL WOUND CLOSURE N/A 12/4/2023    Procedure: CLOSURE, WOUND, STERNUM;  Surgeon: Yuri Washington MD;  Location: [facility] OR 2ND FLR;  Service: Cardiovascular;  Laterality: N/A;    STERNOTOMY N/A 12/1/2023    Procedure: STERNOTOMY, REDO;  Surgeon: Yuri Washington MD;  Location: [facility] OR 2ND FLR;  Service: Cardiovascular;  Laterality: N/A;    VALVULOPLASTY, MITRAL VALVE N/A 12/1/2023    Procedure:  VALVULOPLASTY, MITRAL VALVE;  Surgeon: Yuri Washington MD;  Location: [facility] OR [address] [place] [address];  Service: Cardiovascular;  Laterality: N/A;       Current Medications:   Scheduled Meds:    amiodarone  400 mg Per NG tube Daily    atorvastatin  40 mg Per NG tube Daily    ferrous gluconate  324 mg Per NG tube Daily with breakfast    gabapentin  300 mg Oral QHS    insulin aspart U-100  5 Units Subcutaneous 6 times per day    midodrine  15 mg Oral TID    pantoprazole  40 mg Intravenous BID    piperacillin-tazobactam (Zosyn) IV (PEDS and ADULTS) (extended infusion is not appropriate)  4.5 g Intravenous Q8H    polyethylene glycol  17 g Per NG tube BID    senna-docusate 8.6-50 mg  2 tablet Per NG tube BID    warfarin  2 mg Per NG tube Daily     PRN Meds: 0.9%  NaCl infusion (for blood administration), acetaminophen, albumin human 5%, albuterol sulfate, bisacodyL, dextrose 10 % in water (D10W), dextrose 10%, dextrose 10%, glucagon (human recombinant), insulin aspart U-100, magnesium hydroxide 400 mg/5 ml, magnesium sulfate IVPB, magnesium sulfate IVPB, potassium chloride, potassium chloride, QUEtiapine, Flushing PICC/Midline Protocol **AND** [DISCONTINUED] sodium chloride 0.9% **AND** sodium chloride 0.9%, sodium phosphate 20.01 mmol in dextrose 5 % (D5W) 250 mL IVPB, sodium phosphate 20.01 mmol in dextrose 5 % (D5W) 250 mL IVPB, sodium phosphate 30 mmol in dextrose 5 % (D5W) 250 mL IVPB, sodium phosphate 30 mmol in dextrose 5 % (D5W) 250 mL IVPB, sodium phosphate 39.99 mmol in dextrose 5 % (D5W) 250 mL IVPB, sodium phosphate 39.99 mmol in dextrose 5 % (D5W) 250 mL IVPB    Allergies:   Review of patient's allergies indicates:  No Known Allergies    Vitals  Vitals:    12/10/23 0830   BP:    Pulse: 96   Resp:    Temp:        Labs/Imaging/Studies:  Recent Results (from the past 24 hour(s))   Fibrinogen    Collection Time: 12/09/23 11:45 AM   Result Value Ref Range    Fibrinogen 697 (H) 182 - 400 mg/dL   Protime-INR    Collection Time:  12/09/23 11:45 AM   Result Value Ref Range    Prothrombin Time 12.1 9.0 - 12.5 sec    INR 1.1 0.8 - 1.2   Potassium    Collection Time: 12/09/23 11:45 AM   Result Value Ref Range    Potassium 4.4 3.5 - 5.1 mmol/L   Magnesium    Collection Time: 12/09/23 11:45 AM   Result Value Ref Range    Magnesium 2.2 1.6 - 2.6 mg/dL   APTT    Collection Time: 12/09/23 11:45 AM   Result Value Ref Range    aPTT 55.2 (H) 21.0 - 32.0 sec   POCT glucose    Collection Time: 12/09/23 11:47 AM   Result Value Ref Range    POCT Glucose 214 (H) 70 - 110 mg/dL   Renal function panel    Collection Time: 12/09/23  2:17 PM   Result Value Ref Range    Glucose 182 (H) 70 - 110 mg/dL    Sodium 142 136 - 145 mmol/L    Potassium 4.2 3.5 - 5.1 mmol/L    Chloride 106 95 - 110 mmol/L    CO2 24 23 - 29 mmol/L    BUN 7 (L) 8 - 23 mg/dL    Calcium 8.3 (L) 8.7 - 10.5 mg/dL    Creatinine 0.7 0.5 - 1.4 mg/dL    Albumin 2.1 (L) 3.5 - 5.2 g/dL    Phosphorus 2.6 (L) 2.7 - 4.5 mg/dL    eGFR >60.0 >60 mL/min/1.73 m^2    Anion Gap 12 8 - 16 mmol/L   Magnesium    Collection Time: 12/09/23  2:17 PM   Result Value Ref Range    Magnesium 1.9 1.6 - 2.6 mg/dL   Calcium, ionized    Collection Time: 12/09/23  2:17 PM   Result Value Ref Range    Ionized Calcium 1.03 (L) 1.06 - 1.42 mmol/L   POCT glucose    Collection Time: 12/09/23  3:51 PM   Result Value Ref Range    POCT Glucose 184 (H) 70 - 110 mg/dL   ISTAT PROCEDURE    Collection Time: 12/09/23  4:47 PM   Result Value Ref Range    POC PH 7.493 (H) 7.35 - 7.45    POC PCO2 30.6 (L) 35 - 45 mmHg    POC PO2 113 (H) 80 - 100 mmHg    POC HCO3 23.5 (L) 24 - 28 mmol/L    POC BE 0 -2 to 2 mmol/L    POC SATURATED O2 99 95 - 100 %    POC TCO2 24 23 - 27 mmol/L    Rate 18     Sample ARTERIAL     Site Stef/UAC     Allens Test N/A     DelSys Nasal Can     Mode SPONT     Flow 5    Fibrinogen    Collection Time: 12/09/23  5:34 PM   Result Value Ref Range    Fibrinogen 712 (H) 182 - 400 mg/dL   Protime-INR    Collection Time:  12/09/23  5:34 PM   Result Value Ref Range    Prothrombin Time 12.2 9.0 - 12.5 sec    INR 1.1 0.8 - 1.2   Potassium    Collection Time: 12/09/23  5:34 PM   Result Value Ref Range    Potassium 4.2 3.5 - 5.1 mmol/L   Magnesium    Collection Time: 12/09/23  5:34 PM   Result Value Ref Range    Magnesium 1.8 1.6 - 2.6 mg/dL   APTT    Collection Time: 12/09/23  5:34 PM   Result Value Ref Range    aPTT 43.8 (H) 21.0 - 32.0 sec   ISTAT PROCEDURE    Collection Time: 12/09/23  7:15 PM   Result Value Ref Range    POC PH 7.487 (H) 7.35 - 7.45    POC PCO2 35.2 35 - 45 mmHg    POC PO2 153 (H) 80 - 100 mmHg    POC HCO3 26.6 24 - 28 mmol/L    POC BE 3 (H) -2 to 2 mmol/L    POC SATURATED O2 100 95 - 100 %    POC TCO2 28 (H) 23 - 27 mmol/L    Sample ARTERIAL     Site Stef/[facility]     Allens Test N/A     DelSys Nasal Can    ISTAT Lactate    Collection Time: 12/09/23  7:15 PM   Result Value Ref Range    POC Lactate 1.05 0.36 - 1.25 mmol/L    Sample ARTERIAL     Site Stef/[facility]     Allens Test N/A     DelSys Nasal Can    POCT glucose    Collection Time: 12/09/23  7:16 PM   Result Value Ref Range    POCT Glucose 134 (H) 70 - 110 mg/dL   Calcium, ionized    Collection Time: 12/09/23  9:56 PM   Result Value Ref Range    Ionized Calcium 1.01 (L) 1.06 - 1.42 mmol/L   Renal function panel    Collection Time: 12/09/23  9:56 PM   Result Value Ref Range    Glucose 156 (H) 70 - 110 mg/dL    Sodium 140 136 - 145 mmol/L    Potassium 4.5 3.5 - 5.1 mmol/L    Chloride 107 95 - 110 mmol/L    CO2 20 (L) 23 - 29 mmol/L    BUN 6 (L) 8 - 23 mg/dL    Calcium 8.3 (L) 8.7 - 10.5 mg/dL    Creatinine 0.7 0.5 - 1.4 mg/dL    Albumin 2.1 (L) 3.5 - 5.2 g/dL    Phosphorus 1.3 (L) 2.7 - 4.5 mg/dL    eGFR >60.0 >60 mL/min/1.73 m^2    Anion Gap 13 8 - 16 mmol/L   Magnesium    Collection Time: 12/09/23  9:56 PM   Result Value Ref Range    Magnesium 1.7 1.6 - 2.6 mg/dL   POCT glucose    Collection Time: 12/09/23 11:20 PM   Result Value Ref Range    POCT Glucose 173 (H) 70 -  110 mg/dL   Fibrinogen    Collection Time: 12/09/23 11:21 PM   Result Value Ref Range    Fibrinogen 670 (H) 182 - 400 mg/dL   Protime-INR    Collection Time: 12/09/23 11:21 PM   Result Value Ref Range    Prothrombin Time 12.1 9.0 - 12.5 sec    INR 1.1 0.8 - 1.2   APTT    Collection Time: 12/09/23 11:21 PM   Result Value Ref Range    aPTT 31.3 21.0 - 32.0 sec   ISTAT Lactate    Collection Time: 12/10/23  3:09 AM   Result Value Ref Range    POC Lactate 0.52 0.36 - 1.25 mmol/L    Sample ARTERIAL     Site Stef/[facility]     Allens Test N/A     DelSys Nasal Can    ISTAT PROCEDURE    Collection Time: 12/10/23  3:09 AM   Result Value Ref Range    POC PH 7.483 (H) 7.35 - 7.45    POC PCO2 31.2 (L) 35 - 45 mmHg    POC PO2 133 (H) 80 - 100 mmHg    POC HCO3 23.4 (L) 24 - 28 mmol/L    POC BE 0 -2 to 2 mmol/L    POC SATURATED O2 99 95 - 100 %    POC Sodium 139 136 - 145 mmol/L    POC Potassium 4.2 3.5 - 5.1 mmol/L    POC TCO2 24 23 - 27 mmol/L    POC Ionized Calcium 1.08 1.06 - 1.42 mmol/L    POC Hematocrit 20 (LL) 36 - 54 %PCV    Sample ARTERIAL     Site Stef/[facility]     Allens Test N/A     DelSys Nasal Can    POCT METHEMOGLOBIN Q24H    Collection Time: 12/10/23  3:10 AM   Result Value Ref Range    Methemoglobin 1.7 0 - 3 %   POCT ARTERIAL BLOOD GAS    Collection Time: 12/10/23  3:10 AM   Result Value Ref Range    POC MetHb 1.7 0.0 - 3.0 %    POC Temp 37.0 C    Specimen source Arterial     Performed By: LTRAN     BIPAP 0    POCT glucose    Collection Time: 12/10/23  4:05 AM   Result Value Ref Range    POCT Glucose 207 (H) 70 - 110 mg/dL   Lactate dehydrogenase    Collection Time: 12/10/23  4:06 AM   Result Value Ref Range     (H) 110 - 260 U/L   Fibrinogen    Collection Time: 12/10/23  4:06 AM   Result Value Ref Range    Fibrinogen 657 (H) 182 - 400 mg/dL   Protime-INR    Collection Time: 12/10/23  4:06 AM   Result Value Ref Range    Prothrombin Time 12.1 9.0 - 12.5 sec    INR 1.1 0.8 - 1.2   CBC auto differential    Collection  Time: 12/10/23  4:06 AM   Result Value Ref Range    WBC 17.57 (H) 3.90 - 12.70 K/uL    RBC 2.35 (L) 4.60 - 6.20 M/uL    Hemoglobin 6.6 (L) 14.0 - 18.0 g/dL    Hematocrit 21.1 (L) 40.0 - 54.0 %    MCV 90 82 - 98 fL    MCH 28.1 27.0 - 31.0 pg    MCHC 31.3 (L) 32.0 - 36.0 g/dL    RDW 18.8 (H) 11.5 - 14.5 %    Platelets 253 150 - 450 K/uL    MPV 11.9 9.2 - 12.9 fL    Immature Granulocytes 3.1 (H) 0.0 - 0.5 %    Gran # (ANC) 14.7 (H) 1.8 - 7.7 K/uL    Immature Grans (Abs) 0.54 (H) 0.00 - 0.04 K/uL    Lymph # 1.0 1.0 - 4.8 K/uL    Mono # 1.2 (H) 0.3 - 1.0 K/uL    Eos # 0.0 0.0 - 0.5 K/uL    Baso # 0.06 0.00 - 0.20 K/uL    nRBC 1 (A) 0 /100 WBC    Gran % 84.0 (H) 38.0 - 73.0 %    Lymph % 5.6 (L) 18.0 - 48.0 %    Mono % 6.9 4.0 - 15.0 %    Eosinophil % 0.1 0.0 - 8.0 %    Basophil % 0.3 0.0 - 1.9 %    Differential Method Automated    Magnesium    Collection Time: 12/10/23  4:06 AM   Result Value Ref Range    Magnesium 1.9 1.6 - 2.6 mg/dL   Calcium, ionized    Collection Time: 12/10/23  4:06 AM   Result Value Ref Range    Ionized Calcium 0.96 (L) 1.06 - 1.42 mmol/L   APTT    Collection Time: 12/10/23  4:06 AM   Result Value Ref Range    aPTT 30.2 21.0 - 32.0 sec   Procalcitonin    Collection Time: 12/10/23  4:06 AM   Result Value Ref Range    Procalcitonin 5.24 (H) <0.25 ng/mL   Phosphorus    Collection Time: 12/10/23  4:06 AM   Result Value Ref Range    Phosphorus 3.4 2.7 - 4.5 mg/dL   Comprehensive Metabolic Panel    Collection Time: 12/10/23  4:06 AM   Result Value Ref Range    Sodium 139 136 - 145 mmol/L    Potassium 4.4 3.5 - 5.1 mmol/L    Chloride 105 95 - 110 mmol/L    CO2 20 (L) 23 - 29 mmol/L    Glucose 188 (H) 70 - 110 mg/dL    BUN 6 (L) 8 - 23 mg/dL    Creatinine 0.7 0.5 - 1.4 mg/dL    Calcium 8.5 (L) 8.7 - 10.5 mg/dL    Total Protein 6.1 6.0 - 8.4 g/dL    Albumin 2.1 (L) 3.5 - 5.2 g/dL    Total Bilirubin 1.0 0.1 - 1.0 mg/dL    Alkaline Phosphatase 145 (H) 55 - 135 U/L    AST 55 (H) 10 - 40 U/L    ALT 26 10 - 44  U/L    eGFR >60.0 >60 mL/min/1.73 m^2    Anion Gap 14 8 - 16 mmol/L   POCT glucose    Collection Time: 12/10/23  8:27 AM   Result Value Ref Range    POCT Glucose 186 (H) 70 - 110 mg/dL   ISTAT PROCEDURE    Collection Time: 12/10/23  8:31 AM   Result Value Ref Range    POC PH 7.447 7.35 - 7.45    POC PCO2 35.1 35 - 45 mmHg    POC PO2 133 (H) 80 - 100 mmHg    POC HCO3 24.2 24 - 28 mmol/L    POC BE 0 -2 to 2 mmol/L    POC SATURATED O2 99 95 - 100 %    POC TCO2 25 23 - 27 mmol/L    Sample ARTERIAL     Site Stef/UAC     Allens Test N/A     DelSys Nasal Can     Mode SPONT     Flow 5    ISTAT Lactate    Collection Time: 12/10/23  8:31 AM   Result Value Ref Range    POC Lactate 0.54 0.36 - 1.25 mmol/L    Sample ARTERIAL     Site Stef/UAC     Allens Test N/A     DelSys Nasal Can     Mode SPONT     Flow 5      Imaging Results              X-Ray Chest AP Portable (Final result)  Result time 11/09/23 19:08:29      Final result by Norm Mccain MD (11/09/23 19:08:29)                   Impression:      As above      Electronically signed by: Norm Mccain MD  Date:    11/09/2023  Time:    19:08               Narrative:    EXAMINATION:  XR CHEST AP PORTABLE    CLINICAL HISTORY:  pulmonary edema;    TECHNIQUE:  Single frontal view of the chest was performed.    COMPARISON:  11/09/2023    FINDINGS:  Right PICC catheter tip projects over the mid to distal SVC.  Left chest wall pacer stable.  There is patchy increased interstitial and parenchymal attenuation bilaterally, similar to the previous examination allowing for shallow inspiratory effort on current exam.  There is no pneumothorax or significant interval detrimental change in the cardiopulmonary status since the previous exam.                                       X-Ray Chest AP Portable (Final result)  Result time 11/09/23 09:56:37      Final result by Jake Gupta MD (11/09/23 09:56:37)                   Impression:      The cardiopericardial silhouette remains  enlarged.    Mild pulmonary venous hypertension, possibly related to CHF.    Additional findings as above.      Electronically signed by: Jake Gupta  Date:    11/09/2023  Time:    09:56               Narrative:    EXAMINATION:  XR CHEST AP PORTABLE    CLINICAL HISTORY:  CHF;    TECHNIQUE:  Single frontal view of the chest was performed.    COMPARISON:  Portable chest x-ray 10/30/2023    FINDINGS:  Midline thoracic trachea, allowing for rightward rotation of the patient's torso.    The cardiopericardial silhouette remains enlarged, similar to the comparison study.    Left subclavian transvenous ICD, sternotomy wires, and visualized portions of a right upper extremity PICC line demonstrate no adverse interval changes from 10/30/2023.    Mildly engorged pulmonary vasculature, especially in the upper lobes.  Although patient positioning could contribute to this, some underlying pulmonary venous hypertension is not excluded.    Hazy increased opacities projecting over either lower lung are favored to be related to superimposed chest wall soft tissues.  Some new underlying pulmonary or pleural abnormalities, such as mild pulmonary edema, is not excluded.    No discrete pneumothorax.    No blunting of either lateral costophrenic angle.    Degenerative changes in the suboptimally visualized spine.

## 2023-12-10 NOTE — SUBJECTIVE & OBJECTIVE
Interval History/Significant Events: Since yesterday, patient has had to be restarted on vasopressors (epi, levo, vaso all at 0.04) while on SLED and midodrine increased to 15mg TID. Dobutamine was restarted yesterday as well however discontinued this AM. Pt also stated he wanted to end his life yesterday for which psych has been consulted.     Follow-up For: Procedure(s) (LRB):  CLOSURE, WOUND, STERNUM (N/A)  INSERTION, GRAFT, PERICARDIUM  DRAINAGE, PLEURAL EFFUSION    Post-Operative Day: 6 Days Post-Op    Objective:     Vital Signs (Most Recent):  Temp: 97.6 °F (36.4 °C) (12/10/23 0730)  Pulse: 97 (12/10/23 1000)  Resp: 18 (12/08/23 2305)  BP: (!) 90/0 (12/09/23 2300)  SpO2: 99 % (12/10/23 0345) Vital Signs (24h Range):  Temp:  [97.6 °F (36.4 °C)-98.2 °F (36.8 °C)] 97.6 °F (36.4 °C)  Pulse:  [] 97  SpO2:  [99 %-100 %] 99 %  BP: (78-90)/(0) 90/0  Arterial Line BP: (72-92)/(54-71) 88/70     Weight: 78 kg (172 lb)  Body mass index is 23.33 kg/m².      Intake/Output Summary (Last 24 hours) at 12/10/2023 1012  Last data filed at 12/10/2023 0835  Gross per 24 hour   Intake 6102.17 ml   Output 6409 ml   Net -306.83 ml          Physical Exam  Constitutional:       General: He is not in acute distress.  HENT:      Head: Normocephalic and atraumatic.      Mouth/Throat:      Mouth: Mucous membranes are dry.      Pharynx: Oropharynx is clear.   Eyes:      Extraocular Movements: Extraocular movements intact.      Pupils: Pupils are equal, round, and reactive to light.   Cardiovascular:      Rate and Rhythm: Normal rate.      Pulses: Normal pulses.   Pulmonary:      Effort: Pulmonary effort is normal.   Abdominal:      Palpations: Abdomen is soft.   Musculoskeletal:         General: Normal range of motion.      Cervical back: Normal range of motion.   Skin:     General: Skin is warm and dry.   Neurological:      General: No focal deficit present.      Mental Status: He is alert.            Vents:  Vent Mode: Spont  (12/09/23 0843)  Ventilator Initiated: Yes (12/06/23 1233)  Set Rate: 18 BPM (12/09/23 0255)  Vt Set: 450 mL (12/09/23 0255)  Pressure Support: 10 cmH20 (12/09/23 0843)  PEEP/CPAP: 5 cmH20 (12/09/23 0843)  Oxygen Concentration (%): 40 (12/09/23 1000)  Peak Airway Pressure: 16 cmH20 (12/09/23 0843)  Plateau Pressure: 18 cmH20 (12/09/23 0843)  Total Ve: 8.94 L/m (12/09/23 0843)  Negative Inspiratory Force (cm H2O): -25 (12/09/23 1010)  F/VT Ratio<105 (RSBI): (!) 57.47 (12/08/23 1535)    Lines/Drains/Airways       Peripherally Inserted Central Catheter Line  Duration             PICC Triple Lumen 12/05/23 1208 right basilic 4 days              Central Venous Catheter Line  Duration             Trialysis (Dialysis) Catheter 12/01/23 1530 left internal jugular 8 days              Drain  Duration                  NG/OG Tube 12/04/23 1730 Left nostril 5 days              Arterial Line  Duration             Arterial Line 12/06/23 1526 Left Radial 3 days              Line  Duration                  VAD 12/01/23 1015 Left ventricular assist device HeartMate 3 8 days              Peripheral Intravenous Line  Duration                  Peripheral IV - Single Lumen 12/08/23 0620 20 G Left Forearm 2 days                    Significant Labs:    CBC/Anemia Profile:  Recent Labs   Lab 12/09/23  0323 12/10/23  0309 12/10/23  0406   WBC 10.13  --  17.57*   HGB 7.1*  --  6.6*   HCT 21.2* 20* 21.1*     --  253   MCV 85  --  90   RDW 16.5*  --  18.8*        Chemistries:  Recent Labs   Lab 12/09/23  0323 12/09/23  1145 12/09/23  1417 12/09/23  1734 12/09/23  2156 12/10/23  0406     --  142  --  140 139   K 4.2   < > 4.2 4.2 4.5 4.4     --  106  --  107 105   CO2 24  --  24  --  20* 20*   BUN 8  --  7*  --  6* 6*   CREATININE 0.8  --  0.7  --  0.7 0.7   CALCIUM 8.1*  --  8.3*  --  8.3* 8.5*   ALBUMIN 2.0*  --  2.1*  --  2.1* 2.1*   PROT 6.2  --   --   --   --  6.1   BILITOT 0.7  --   --   --   --  1.0   ALKPHOS 147*  --    --   --   --  145*   ALT 28  --   --   --   --  26   AST 44*  --   --   --   --  55*   MG 1.7   < > 1.9 1.8 1.7 1.9   PHOS 1.5*  --  2.6*  --  1.3* 3.4    < > = values in this interval not displayed.       All pertinent labs within the past 24 hours have been reviewed.    Significant Imaging:  I have reviewed all pertinent imaging results/findings within the past 24 hours.

## 2023-12-10 NOTE — ASSESSMENT & PLAN NOTE
64 yo man s/p destination therapy left ventricular assist device placement. Postoperatively the chest was left open due to diffuse coagulopathy and RV dysfunction. He did well post-operatively and underwent washout and closure on 12/4. The patient was extubated but had to be reintubated. A BAL was performed: Gram stain with GNB, culture is NGTD. PCT elevated at 10 improved to 7. Resp culture is NG. Extubated and loks well despite increased pressor requirement and leukocytosis. Inflammatory> Post-extubation? Since PCT is improving, would obtain surveillance cultures and follow closely for now. If he deteriorates, would escalate to meropenem and vancomycin (and maybe micafungin), empirically.    Recommendations  Continue empiric Zosyn  Follow-up PCT in am (10 -> 7->5)  Discussed with family and SI

## 2023-12-10 NOTE — SUBJECTIVE & OBJECTIVE
Interval History: Patient seen and examined this AM. Increase ca to 30 ml /hr  Citrate 150 ml/hr  morning H&H 6.6&21.1   Review of patient's allergies indicates:  No Known Allergies  Current Facility-Administered Medications   Medication Frequency    0.9%  NaCl infusion (for blood administration) Q24H PRN    0.9%  NaCl infusion Continuous    acetaminophen tablet 650 mg Q6H PRN    albumin human 5% bottle 25 g PRN    albuterol sulfate nebulizer solution 2.5 mg Q4H PRN    amiodarone tablet 400 mg Daily    atorvastatin tablet 40 mg Daily    bisacodyL suppository 10 mg Daily PRN    calcium gluconate 3 g in dextrose 5 % (D5W) 100 mL infusion Continuous    dexmedetomidine (PRECEDEX) 400mcg/100mL 0.9% NaCL infusion Continuous    dextrose 10 % infusion Continuous PRN    dextrose 10% bolus 125 mL 125 mL PRN    dextrose 10% bolus 250 mL 250 mL PRN    dextrose-sod citrate-citric ac 2.45-2.2 gram- 800 mg/100 mL Soln Continuous    DOBUtamine 1000 mg in D5W 250 mL infusion Continuous    EPINEPHrine 5 mg in dextrose 5% 250 mL infusion (premix) Continuous    ferrous gluconate tablet 324 mg Daily with breakfast    gabapentin capsule 300 mg QHS    glucagon (human recombinant) injection 1 mg PRN    heparin 25,000 units in dextrose 5% 250 mL (100 units/mL) infusion (heparin infusion - NO NOMOGRAM) Continuous    insulin aspart U-100 pen 0-10 Units Q4H PRN    insulin aspart U-100 pen 5 Units 6 times per day    magnesium hydroxide 400 mg/5 ml suspension 2,400 mg Daily PRN    magnesium sulfate 2g in water 50mL IVPB (premix) PRN    midodrine tablet 15 mg TID    niCARdipine 40 mg/200 mL (0.2 mg/mL) infusion Continuous    nitric oxide gas Gas 5 ppm Continuous    NORepinephrine 4 mg in dextrose 5% 250 mL infusion (premix) Continuous    pantoprazole injection 40 mg BID    piperacillin-tazobactam (ZOSYN) 4.5 g in dextrose 5 % in water (D5W) 100 mL IVPB (MB+) Q8H    polyethylene glycol packet 17 g BID    potassium chloride 20 mEq in 100 mL IVPB  (FOR CENTRAL LINE ADMINISTRATION ONLY) PRN    potassium chloride 20 mEq in 100 mL IVPB (FOR CENTRAL LINE ADMINISTRATION ONLY) PRN    QUEtiapine tablet 50 mg Q6H PRN    senna-docusate 8.6-50 mg per tablet 2 tablet BID    sodium chloride 0.9% flush 10 mL PRN    sodium phosphate 20.01 mmol in dextrose 5 % (D5W) 250 mL IVPB PRN    sodium phosphate 30 mmol in dextrose 5 % (D5W) 250 mL IVPB PRN    sodium phosphate 39.99 mmol in dextrose 5 % (D5W) 250 mL IVPB PRN    vasopressin (PITRESSIN) 0.2 Units/mL in dextrose 5 % (D5W) 100 mL infusion Continuous    warfarin (COUMADIN) tablet 2 mg Daily       Objective:     Vital Signs (Most Recent):  Temp: 97.6 °F (36.4 °C) (12/10/23 0730)  Pulse: 97 (12/10/23 0730)  Resp: 18 (12/08/23 2305)  BP: (!) 90/0 (12/09/23 2300)  SpO2: 99 % (12/10/23 0345) Vital Signs (24h Range):  Temp:  [97.6 °F (36.4 °C)-98.2 °F (36.8 °C)] 97.6 °F (36.4 °C)  Pulse:  [] 97  SpO2:  [98 %-100 %] 99 %  BP: (78-90)/(0) 90/0  Arterial Line BP: (72-92)/(54-70) 81/62     Weight: 78 kg (172 lb) (12/10/23 0509)  Body mass index is 23.33 kg/m².  Body surface area is 1.99 meters squared.    I/O last 3 completed shifts:  In: 42392.6 [I.V.:3013.9; NG/GT:1090; IV Piggyback:6655.7]  Out: 03012 [Other:62006]     Physical Exam  Vitals and nursing note reviewed.   Constitutional:       General: He is not in acute distress.     Appearance: He is ill-appearing. He is not diaphoretic.      Interventions: He is sedated and intubated.   HENT:      Head: Normocephalic and atraumatic.      Right Ear: External ear normal.      Left Ear: External ear normal.      Nose:      Comments: NG tube to left nostril.     Mouth/Throat:      Comments: ET tube in place.  Eyes:      General: No scleral icterus.        Right eye: No discharge.         Left eye: No discharge.      Conjunctiva/sclera: Conjunctivae normal.   Neck:      Comments: Trialysis catheter to left internal jugular vein. Latexo Siva catheter in place.  Cardiovascular:       Rate and Rhythm: Normal rate.      Heart sounds: Murmur heard.      Systolic murmur is present.      No friction rub. No gallop.   Pulmonary:      Effort: Tachypnea present. He is intubated.      Breath sounds: Rales present. No wheezing.      Comments: Bibasilar crackles appreciated.   Chest:      Comments: Two chest tubes in place with LVAD present. Chest remains open at this time.  Abdominal:      General: Bowel sounds are normal. There is no distension.      Palpations: Abdomen is soft.   Genitourinary:     Comments: Hampton catheter in place.  Musculoskeletal:      Cervical back: Neck supple.      Right lower leg: No edema.      Left lower leg: No edema.   Skin:     General: Skin is warm and dry.      Coloration: Skin is not jaundiced.   Neurological:      Mental Status: He is confused.      Comments: Unable to fully assess.   Psychiatric:      Comments: Unable to fully assess at this time.          Significant Labs:  ABGs:   Recent Labs   Lab 12/10/23  0309   PH 7.483*   PCO2 31.2*   HCO3 23.4*   POCSATURATED 99   BE 0       BMP:   Recent Labs   Lab 12/10/23  0406   *      K 4.4      CO2 20*   BUN 6*   CREATININE 0.7   CALCIUM 8.5*   MG 1.9       CBC:   Recent Labs   Lab 12/10/23  0406   WBC 17.57*   RBC 2.35*   HGB 6.6*   HCT 21.1*      MCV 90   MCH 28.1   MCHC 31.3*       CMP:   Recent Labs   Lab 12/10/23  0406   *   CALCIUM 8.5*   ALBUMIN 2.1*   PROT 6.1      K 4.4   CO2 20*      BUN 6*   CREATININE 0.7   ALKPHOS 145*   ALT 26   AST 55*   BILITOT 1.0       Coagulation:   Recent Labs   Lab 12/10/23  0406   INR 1.1   APTT 30.2       LFTs:   Recent Labs   Lab 12/10/23  0406   ALT 26   AST 55*   ALKPHOS 145*   BILITOT 1.0   PROT 6.1   ALBUMIN 2.1*       Microbiology Results (last 7 days)       Procedure Component Value Units Date/Time    Culture, Respiratory [[id number]] Collected: 12/06/23 1531    Order Status: Completed Specimen: Respiratory from Bronchial Wash  Updated: 12/08/23 1103     Respiratory Culture No growth     Gram Stain (Respiratory) Moderate WBC's     Gram Stain (Respiratory) Rare Gram negative rods    Narrative:      Bronchial Wash    AFB Culture & Smear [[id number]] Collected: 12/06/23 1532    Order Status: Completed Specimen: Body Fluid from Lung, Right Updated: 12/07/23 2127     AFB Culture & Smear Culture in progress     AFB CULTURE STAIN No acid fast bacilli seen.    Narrative:      Bronchial Wash    Direct AFB stain [[id number]] Collected: 12/06/23 1532    Order Status: Completed Specimen: Body Fluid from Lung, Right Updated: 12/06/23 2257     Direct Acid Fast No acid fast bacilli seen.    Narrative:      Bronchial Wash    Fungus culture [[id number]] Collected: 12/06/23 1532    Order Status: Sent Specimen: Body Fluid from Lung, Right Updated: 12/06/23 1719    Gram stain [[id number]] Collected: 12/06/23 1532    Order Status: Canceled Specimen: Body Fluid from Lung, Right     Blood culture [[id number]] Collected: 11/28/23 1452    Order Status: Completed Specimen: Blood from Peripheral, Upper Arm, Right Updated: 12/03/23 1612     Blood Culture, Routine No growth after 5 days.    Blood culture [[id number]] Collected: 11/28/23 1450    Order Status: Completed Specimen: Blood from Peripheral, Forearm, Right Updated: 12/03/23 1612     Blood Culture, Routine No growth after 5 days.          Specimen (24h ago, onward)      None          Significant Imaging:  I have reviewed all imagining in the last 24 hours.

## 2023-12-10 NOTE — PROCEDURES
Radha Abbott is a 63 y.o. male patient.    Temp: 97.6 °F (36.4 °C) (12/10/23 0730)  Pulse: 97 (12/10/23 1000)  Resp: 18 (12/08/23 2305)  BP: (!) 90/0 (12/09/23 2300)  SpO2: 99 % (12/10/23 0345)  Weight: 78 kg (172 lb) (12/10/23 0509)  Height: 6' (182.9 cm) (12/09/23 0747)  Mallampati Scale: Class III  ASA Classification: Class 3    Central Line    Date/Time: 12/10/2023 12:00 PM    Performed by: Ashanti Pete MD  Authorized by: Ashanti Pete MD    Supervisor Name:  Dr. Hood  Location procedure was performed:  [facility] CRITICAL CARE SURGERY  Pre-operative diagnosis:  Acute renal failure  Post-operative diagnosis:  Acute Renal Failure  Consent Done ?:  Yes  Time out complete?: Verified correct patient, procedure, equipment, staff, and site/side    Indications:  Hemodialysis  Anesthesia:  Local infiltration  Local anesthetic:  Lidocaine 2% without epinephrine  Anesthetic total (ml):  1  Preparation:  Skin prepped with ChloraPrep  Skin prep agent dried: Skin prep agent completely dried prior to procedure    Sterile barriers: All five maximal sterile barriers used - gloves, gown, cap, mask and large sterile sheet    Hand hygiene: Hand hygiene performed immediately prior to central venous catheter insertion    Location:  Right internal jugular  Catheter type:  Trialysis  Catheter size:  13 Fr  Inserted Catheter Length (cm):  15  Ultrasound guidance: Yes    Vessel Caliber:  Large   patent  Comprressibility:  Normal  Needle advanced into vessel with real time ultrasound guidance.    Guidewire confirmed in vessel.    Steril sheath on probe.    Sterile gel used.  Manometry: No    Number of attempts:  1  Securement:  Line sutured, sterile dressing applied and blood return through all ports  Complications: No    Estimated blood loss (mL):  1  Specimens: Yes (specify)    Implants: No    XRay:  Successful placement  Adverse Events:  NoneTermination Site: superior vena cava      12/10/2023

## 2023-12-10 NOTE — PROGRESS NOTES
12/10/2023  [place]    Current provider:  Yuri Washington MD    Device interrogation:      12/10/2023     3:00 PM 12/10/2023     2:00 PM 12/10/2023     1:00 PM 12/10/2023    12:00 PM 12/10/2023    11:00 AM 12/10/2023    10:00 AM 12/10/2023     9:00 AM   TXP LVAD INTERROGATIONS   Type HeartMate3 HeartMate3 HeartMate3 HeartMate3 HeartMate3 HeartMate3 HeartMate3   Flow 4.1 4.3 4.2 4 4 4 4.2   Speed 4900 4900 4950 4900 4900 4950 4900   PI 3.3 3.3 4 4.1 4 3.8 3.7   Power ([place]) 3.1 3.2 3.5 3.3 3.5 3.3 3.5   LSL 4500 4500 4500 4500 4500 4500 4500   Pulsatility Intermittent pulse Intermittent pulse Intermittent pulse Intermittent pulse Intermittent pulse Intermittent pulse Intermittent pulse          Rounded on [facility] to ensure all mechanical assist device settings (IABP or VAD) were appropriate and all parameters were within limits.  I was able to ensure all back up equipment was present, the staff had no issues, and the Perfusion Department daily rounding was complete.      For implantable VADs: Interrogation of Ventricular assist device was performed with analysis of device parameters and review of device function. I have personally reviewed the interrogation findings and agree with findings as stated.     In emergency, the nursing units have been notified to contact the perfusion department either by:  Calling [id number] from 630am to 4pm Mon thru Fri, utilizing the On-Call Finder functionality of Epic and searching for Perfusion, or by contacting the hospital  from 4pm to 630am and on weekends and asking to speak with the perfusionist on call.    5:47 PM

## 2023-12-10 NOTE — NURSING
Ionized calcium 1.01. Nephrology on call notified, orders received to increase calcium gluconate from 25ml/hr to 30ml/hr.

## 2023-12-10 NOTE — PROGRESS NOTES
12/10/23 0147   Treatment   Treatment Type SLED   Treatment Status Daily equipment check   Dialysis Machine Number [id number]   Dialyzer Time (hours) 52.02   BVP (Liters) 314.1 L   Solutions Labeled and Current  Yes   Access Temporary Cath;Left;IJ   Catheter Dressing Intact  Yes   Alarms Engaged Yes   CRRT Comments daily check done

## 2023-12-10 NOTE — PROGRESS NOTES
12/10/23 0835   Treatment   Treatment Type SLED   Treatment Status Equipment change;Restart   Dialysis Machine Number [id number]   Solutions Labeled and Current  Yes   Access Temporary Cath;Left;IJ   Catheter Dressing Intact  Yes   Alarms Engaged Yes   CRRT Comments Sled tx restarted     Report given to primary nurse.

## 2023-12-10 NOTE — PROGRESS NOTES
[facility] - Surgical Intensive Care  Infectious Disease  Progress Note    Patient Name: Radha Abbott  MRN: [id number]  Admission Date: 11/9/2023  Length of Stay: 31 days  Attending Physician: Yuri Washington MD  Primary Care Provider: Vasu Kong MD    Isolation Status: No active isolations  Assessment/Plan:      64 yo man s/p destination therapy left ventricular assist device placement. Postoperatively the chest was left open due to diffuse coagulopathy and RV dysfunction. He did well post-operatively and underwent washout and closure on 12/4. The patient was extubated but had to be reintubated. A BAL was performed: Gram stain with GNB, culture is NGTD. PCT elevated at 10 improved to 7. Resp culture is NG. Extubated and loks well despite increased pressor requirement and leukocytosis. Inflammatory> Post-extubation? Since PCT is improving, would obtain surveillance cultures and follow closely for now. If he deteriorates, would escalate to meropenem and vancomycin (and maybe micafungin), empirically.    Recommendations  Continue empiric Zosyn  Follow-up PCT in am (10 -> 7->5)  Discussed with family and SI    Albert Burt MD  Infectious Disease  [facility] - Surgical Intensive Care    Subjective:     Principal Problem:Acute on chronic combined systolic and diastolic CHF, NYHA class 4    HPI:  Mr. Abbott is a 63-year-old man who underwent destination therapy left ventricular assist device placement.  Postoperatively the chest was left open due to diffuse coagulopathy in RV dysfunction.  He did well post-operatively and underwent washout and closure on 12/4. The patient was extubated but had to be reintubated. A BAL was performed: Gram stain with GNB, culture is NGTD. ID is consulted for BAL results. The patient is agitated but quiets down with reassurance. No fever or chills. Ecgo done this am - results pending. Family at bedside.     Interval History: Now extubated. Increased pressor requirements. Mild  leukocytosis - new. Brother at bedside.    Review of Systems   Constitutional:  Negative for chills and fever.   All other systems reviewed and are negative.    Objective:     Vital Signs (Most Recent):  Temp: 97.6 °F (36.4 °C) (12/10/23 0730)  Pulse: 97 (12/10/23 1000)  Resp: 18 (12/08/23 2305)  BP: (!) 90/0 (12/09/23 2300)  SpO2: 99 % (12/10/23 0345) Vital Signs (24h Range):  Temp:  [97.6 °F (36.4 °C)-98.2 °F (36.8 °C)] 97.6 °F (36.4 °C)  Pulse:  [] 97  SpO2:  [99 %-100 %] 99 %  BP: (90)/(0) 90/0  Arterial Line BP: (72-92)/(54-71) 88/70     Weight: 78 kg (172 lb)  Body mass index is 23.33 kg/m².    Estimated Creatinine Clearance: 121.6 mL/min (based on SCr of 0.7 mg/dL).     Physical Exam  Vitals and nursing note reviewed.   Constitutional:       Appearance: Normal appearance.   HENT:      Head: Normocephalic.      Right Ear: External ear normal.      Left Ear: External ear normal.   Eyes:      Extraocular Movements: Extraocular movements intact.      Pupils: Pupils are equal, round, and reactive to light.   Neck:      Comments: Lines  Cardiovascular:      Rate and Rhythm: Normal rate.      Comments: humm  Abdominal:      Tenderness: There is no abdominal tenderness. There is no guarding.   Skin:     Findings: No erythema or rash.   Neurological:      General: No focal deficit present.      Mental Status: He is alert.   Psychiatric:         Mood and Affect: Mood normal.         Behavior: Behavior normal.          Significant Labs: Blood Culture:   Recent Labs   Lab 11/28/23  1450 11/28/23  1452   LABBLOO No growth after 5 days. No growth after 5 days.     BMP:   Recent Labs   Lab 12/10/23  0406 12/10/23  1127   *  --      --    K 4.4 4.4     --    CO2 20*  --    BUN 6*  --    CREATININE 0.7  --    CALCIUM 8.5*  --    MG 1.9 2.1     CBC:   Recent Labs   Lab 12/09/23  0323 12/10/23  0309 12/10/23  0406   WBC 10.13  --  17.57*   HGB 7.1*  --  6.6*   HCT 21.2* 20* 21.1*     --  253  "    Procalcitonin:   Recent Labs   Lab 12/09/23  0323 12/10/23  0406   PROCAL 7.15* 5.24*     Respiratory Culture:   Recent Labs   Lab 12/06/23  1531   GSRESP Moderate WBC's  Rare Gram negative rods   RESPIRATORYC No growth     Urine Culture: No results for input(s): "LABURIN" in the last 4320 hours.    Significant Imaging: I have reviewed all pertinent imaging results/findings within the past 24 hours.  "

## 2023-12-11 PROBLEM — R65.21 SEPTIC SHOCK: Status: ACTIVE | Noted: 2023-12-11

## 2023-12-11 PROBLEM — A41.9 SEPTIC SHOCK: Status: ACTIVE | Noted: 2023-12-11

## 2023-12-11 LAB
ABO + RH BLD: NORMAL
ALBUMIN SERPL BCP-MCNC: 2 G/DL (ref 3.5–5.2)
ALBUMIN SERPL BCP-MCNC: 2 G/DL (ref 3.5–5.2)
ALBUMIN SERPL BCP-MCNC: 2.1 G/DL (ref 3.5–5.2)
ALLENS TEST: ABNORMAL
ALLENS TEST: NORMAL
ALP SERPL-CCNC: 174 U/L (ref 55–135)
ALP SERPL-CCNC: 174 U/L (ref 55–135)
ALT SERPL W/O P-5'-P-CCNC: 27 U/L (ref 10–44)
ALT SERPL W/O P-5'-P-CCNC: 27 U/L (ref 10–44)
ANION GAP SERPL CALC-SCNC: 11 MMOL/L (ref 8–16)
ANION GAP SERPL CALC-SCNC: 11 MMOL/L (ref 8–16)
ANION GAP SERPL CALC-SCNC: 12 MMOL/L (ref 8–16)
ANION GAP SERPL CALC-SCNC: 12 MMOL/L (ref 8–16)
ANION GAP SERPL CALC-SCNC: 13 MMOL/L (ref 8–16)
ANISOCYTOSIS BLD QL SMEAR: SLIGHT
APTT PPP: 36.5 SEC (ref 21–32)
APTT PPP: 39.6 SEC (ref 21–32)
APTT PPP: 42.7 SEC (ref 21–32)
APTT PPP: 51 SEC (ref 21–32)
ASCENDING AORTA: 2.93 CM
AST SERPL-CCNC: 47 U/L (ref 10–40)
AST SERPL-CCNC: 47 U/L (ref 10–40)
BASOPHILS # BLD AUTO: 0.08 K/UL (ref 0–0.2)
BASOPHILS NFR BLD: 0.3 % (ref 0–1.9)
BILIRUB DIRECT SERPL-MCNC: 0.6 MG/DL (ref 0.1–0.3)
BILIRUB SERPL-MCNC: 1.3 MG/DL (ref 0.1–1)
BILIRUB SERPL-MCNC: 1.4 MG/DL (ref 0.1–1)
BIPAP: 0
BLD GP AB SCN CELLS X3 SERPL QL: NORMAL
BNP SERPL-MCNC: 2154 PG/ML (ref 0–99)
BSA FOR ECHO PROCEDURE: 1.99 M2
BUN SERPL-MCNC: 10 MG/DL (ref 8–23)
BUN SERPL-MCNC: 7 MG/DL (ref 8–23)
BUN SERPL-MCNC: 8 MG/DL (ref 8–23)
CA-I BLDV-SCNC: 1.03 MMOL/L (ref 1.06–1.42)
CA-I BLDV-SCNC: 1.08 MMOL/L (ref 1.06–1.42)
CALCIUM SERPL-MCNC: 8.1 MG/DL (ref 8.7–10.5)
CALCIUM SERPL-MCNC: 8.4 MG/DL (ref 8.7–10.5)
CALCIUM SERPL-MCNC: 8.7 MG/DL (ref 8.7–10.5)
CALCIUM SERPL-MCNC: 8.7 MG/DL (ref 8.7–10.5)
CALCIUM SERPL-MCNC: 8.8 MG/DL (ref 8.7–10.5)
CHLORIDE SERPL-SCNC: 102 MMOL/L (ref 95–110)
CHLORIDE SERPL-SCNC: 103 MMOL/L (ref 95–110)
CO2 SERPL-SCNC: 20 MMOL/L (ref 23–29)
CO2 SERPL-SCNC: 24 MMOL/L (ref 23–29)
CREAT SERPL-MCNC: 0.7 MG/DL (ref 0.5–1.4)
CREAT SERPL-MCNC: 0.8 MG/DL (ref 0.5–1.4)
CREAT SERPL-MCNC: 0.9 MG/DL (ref 0.5–1.4)
CRP SERPL-MCNC: 288.6 MG/L (ref 0–8.2)
CV ECHO LV RWT: 0.32 CM
DELSYS: ABNORMAL
DELSYS: NORMAL
DIFFERENTIAL METHOD BLD: ABNORMAL
DOP CALC LVOT AREA: 3.2 CM2
DOP CALC LVOT DIAMETER: 2.03 CM
DOP CALC RVOT PEAK VEL: 0.72 M/S
DOP CALC RVOT VTI: 11.6 CM
E WAVE DECELERATION TIME: 140.39 MSEC
E/A RATIO: 1
E/E' RATIO: 12.6 M/S
ECHO LV POSTERIOR WALL: 0.97 CM (ref 0.6–1.1)
EOSINOPHIL # BLD AUTO: 0.1 K/UL (ref 0–0.5)
EOSINOPHIL NFR BLD: 0.2 % (ref 0–8)
ERYTHROCYTE [DISTWIDTH] IN BLOOD BY AUTOMATED COUNT: 17.2 % (ref 11.5–14.5)
ERYTHROCYTE [SEDIMENTATION RATE] IN BLOOD BY WESTERGREN METHOD: 18 MM/H
EST. GFR  (NO RACE VARIABLE): >60 ML/MIN/1.73 M^2
FIBRINOGEN PPP-MCNC: 684 MG/DL (ref 182–400)
FIBRINOGEN PPP-MCNC: 697 MG/DL (ref 182–400)
FIBRINOGEN PPP-MCNC: 699 MG/DL (ref 182–400)
FIBRINOGEN PPP-MCNC: 746 MG/DL (ref 182–400)
FIO2: 40
FIO2: 40
FLOW: 5
FRACTIONAL SHORTENING: 7 % (ref 28–44)
GLUCOSE SERPL-MCNC: 146 MG/DL (ref 70–110)
GLUCOSE SERPL-MCNC: 157 MG/DL (ref 70–110)
GLUCOSE SERPL-MCNC: 199 MG/DL (ref 70–110)
HCO3 UR-SCNC: 21.4 MMOL/L (ref 24–28)
HCO3 UR-SCNC: 23.8 MMOL/L (ref 24–28)
HCO3 UR-SCNC: 24.4 MMOL/L (ref 24–28)
HCO3 UR-SCNC: 24.8 MMOL/L (ref 24–28)
HCO3 UR-SCNC: 26.3 MMOL/L (ref 24–28)
HCO3 UR-SCNC: 27.4 MMOL/L (ref 24–28)
HCO3 UR-SCNC: 27.6 MMOL/L (ref 24–28)
HCT VFR BLD AUTO: 23.2 % (ref 40–54)
HCT VFR BLD CALC: 23 %PCV (ref 36–54)
HCT VFR BLD CALC: 25 %PCV (ref 36–54)
HGB BLD-MCNC: 7.7 G/DL (ref 14–18)
HYPOCHROMIA BLD QL SMEAR: ABNORMAL
IMM GRANULOCYTES # BLD AUTO: 0.79 K/UL (ref 0–0.04)
IMM GRANULOCYTES NFR BLD AUTO: 2.9 % (ref 0–0.5)
INR PPP: 1.2 (ref 0.8–1.2)
INR PPP: 1.3 (ref 0.8–1.2)
INTERVENTRICULAR SEPTUM: 0.92 CM (ref 0.6–1.1)
LA MAJOR: 6.45 CM
LA MINOR: 6.24 CM
LA WIDTH: 5.6 CM
LDH SERPL L TO P-CCNC: 0.44 MMOL/L (ref 0.36–1.25)
LDH SERPL L TO P-CCNC: 0.46 MMOL/L (ref 0.36–1.25)
LDH SERPL L TO P-CCNC: 0.49 MMOL/L (ref 0.36–1.25)
LDH SERPL L TO P-CCNC: 0.5 MMOL/L (ref 0.36–1.25)
LDH SERPL L TO P-CCNC: 0.59 MMOL/L (ref 0.36–1.25)
LDH SERPL L TO P-CCNC: 717 U/L (ref 110–260)
LEFT ATRIUM SIZE: 4.05 CM
LEFT ATRIUM VOLUME INDEX MOD: 79.5 ML/M2
LEFT ATRIUM VOLUME INDEX: 61.1 ML/M2
LEFT ATRIUM VOLUME MOD: 158.93 CM3
LEFT ATRIUM VOLUME: 122.29 CM3
LEFT INTERNAL DIMENSION IN SYSTOLE: 5.71 CM (ref 2.1–4)
LEFT VENTRICLE DIASTOLIC VOLUME INDEX: 94.25 ML/M2
LEFT VENTRICLE DIASTOLIC VOLUME: 188.49 ML
LEFT VENTRICLE MASS INDEX: 119 G/M2
LEFT VENTRICLE SYSTOLIC VOLUME INDEX: 80.4 ML/M2
LEFT VENTRICLE SYSTOLIC VOLUME: 160.7 ML
LEFT VENTRICULAR INTERNAL DIMENSION IN DIASTOLE: 6.12 CM (ref 3.5–6)
LEFT VENTRICULAR MASS: 237.47 G
LV LATERAL E/E' RATIO: 10.5 M/S
LV SEPTAL E/E' RATIO: 15.75 M/S
LYMPHOCYTES # BLD AUTO: 1 K/UL (ref 1–4.8)
LYMPHOCYTES NFR BLD: 3.6 % (ref 18–48)
MAGNESIUM SERPL-MCNC: 1.8 MG/DL (ref 1.6–2.6)
MAGNESIUM SERPL-MCNC: 1.9 MG/DL (ref 1.6–2.6)
MAGNESIUM SERPL-MCNC: 1.9 MG/DL (ref 1.6–2.6)
MAGNESIUM SERPL-MCNC: 2 MG/DL (ref 1.6–2.6)
MAGNESIUM SERPL-MCNC: 2 MG/DL (ref 1.6–2.6)
MAP: 8.6
MCH RBC QN AUTO: 28.7 PG (ref 27–31)
MCHC RBC AUTO-ENTMCNC: 33.2 G/DL (ref 32–36)
MCV RBC AUTO: 87 FL (ref 82–98)
METHEMOGLOBIN: 0.7 % (ref 0–3)
MIN VOL: 9
MIN VOL: 9.3
MODE: ABNORMAL
MODE: NORMAL
MODE: NORMAL
MONOCYTES # BLD AUTO: 1.4 K/UL (ref 0.3–1)
MONOCYTES NFR BLD: 5 % (ref 4–15)
MV PEAK A VEL: 0.63 M/S
MV PEAK E VEL: 0.63 M/S
MV STENOSIS PRESSURE HALF TIME: 40.71 MS
MV VALVE AREA P 1/2 METHOD: 5.4 CM2
NEUTROPHILS # BLD AUTO: 24.4 K/UL (ref 1.8–7.7)
NEUTROPHILS NFR BLD: 88 % (ref 38–73)
NRBC BLD-RTO: 2 /100 WBC
OVALOCYTES BLD QL SMEAR: ABNORMAL
PCO2 BLDA: 29.4 MMHG (ref 35–45)
PCO2 BLDA: 33.7 MMHG (ref 35–45)
PCO2 BLDA: 34.9 MMHG (ref 35–45)
PCO2 BLDA: 35.9 MMHG (ref 35–45)
PCO2 BLDA: 35.9 MMHG (ref 35–45)
PCO2 BLDA: 40.6 MMHG (ref 35–45)
PCO2 BLDA: 43.4 MMHG (ref 35–45)
PEEP: 5
PEEP: 5
PH SMN: 7.39 [PH] (ref 7.35–7.45)
PH SMN: 7.41 [PH] (ref 7.35–7.45)
PH SMN: 7.44 [PH] (ref 7.35–7.45)
PH SMN: 7.45 [PH] (ref 7.35–7.45)
PH SMN: 7.47 [PH] (ref 7.35–7.45)
PH SMN: 7.49 [PH] (ref 7.35–7.45)
PH SMN: 7.5 [PH] (ref 7.35–7.45)
PHOSPHATE SERPL-MCNC: 1.4 MG/DL (ref 2.7–4.5)
PHOSPHATE SERPL-MCNC: 2.2 MG/DL (ref 2.7–4.5)
PHOSPHATE SERPL-MCNC: 2.5 MG/DL (ref 2.7–4.5)
PIP: 19
PLATELET # BLD AUTO: 287 K/UL (ref 150–450)
PMV BLD AUTO: 11.8 FL (ref 9.2–12.9)
PO2 BLDA: 159 MMHG (ref 80–100)
PO2 BLDA: 188 MMHG (ref 80–100)
PO2 BLDA: 188 MMHG (ref 80–100)
PO2 BLDA: 189 MMHG (ref 80–100)
PO2 BLDA: 196 MMHG (ref 80–100)
PO2 BLDA: 26 MMHG (ref 40–60)
PO2 BLDA: 26 MMHG (ref 80–100)
POC BE: -1 MMOL/L
POC BE: -2 MMOL/L
POC BE: 0 MMOL/L
POC BE: 1 MMOL/L
POC BE: 3 MMOL/L
POC BE: 3 MMOL/L
POC BE: 4 MMOL/L
POC IONIZED CALCIUM: 1.08 MMOL/L (ref 1.06–1.42)
POC IONIZED CALCIUM: 1.09 MMOL/L (ref 1.06–1.42)
POC METHB: 0.7 % (ref 0–3)
POC PERFORMED BY: NORMAL
POC SATURATED O2: 100 % (ref 95–100)
POC SATURATED O2: 47 % (ref 95–100)
POC SATURATED O2: 48 % (ref 95–100)
POC TCO2: 22 MMOL/L (ref 23–27)
POC TCO2: 25 MMOL/L (ref 23–27)
POC TCO2: 26 MMOL/L (ref 23–27)
POC TCO2: 26 MMOL/L (ref 24–29)
POC TCO2: 27 MMOL/L (ref 23–27)
POC TCO2: 28 MMOL/L (ref 23–27)
POC TCO2: 29 MMOL/L (ref 23–27)
POC TEMPERATURE: 37 C
POCT GLUCOSE: 147 MG/DL (ref 70–110)
POCT GLUCOSE: 162 MG/DL (ref 70–110)
POCT GLUCOSE: 168 MG/DL (ref 70–110)
POCT GLUCOSE: 194 MG/DL (ref 70–110)
POCT GLUCOSE: 206 MG/DL (ref 70–110)
POIKILOCYTOSIS BLD QL SMEAR: SLIGHT
POLYCHROMASIA BLD QL SMEAR: ABNORMAL
POTASSIUM BLD-SCNC: 4.2 MMOL/L (ref 3.5–5.1)
POTASSIUM BLD-SCNC: 4.5 MMOL/L (ref 3.5–5.1)
POTASSIUM SERPL-SCNC: 4.1 MMOL/L (ref 3.5–5.1)
POTASSIUM SERPL-SCNC: 4.2 MMOL/L (ref 3.5–5.1)
POTASSIUM SERPL-SCNC: 4.4 MMOL/L (ref 3.5–5.1)
POTASSIUM SERPL-SCNC: 4.5 MMOL/L (ref 3.5–5.1)
POTASSIUM SERPL-SCNC: 4.9 MMOL/L (ref 3.5–5.1)
PROCALCITONIN SERPL IA-MCNC: 6.57 NG/ML
PROT SERPL-MCNC: 6.5 G/DL (ref 6–8.4)
PROT SERPL-MCNC: 6.5 G/DL (ref 6–8.4)
PROTHROMBIN TIME: 12.7 SEC (ref 9–12.5)
PROTHROMBIN TIME: 12.7 SEC (ref 9–12.5)
PROTHROMBIN TIME: 13 SEC (ref 9–12.5)
PROTHROMBIN TIME: 13.3 SEC (ref 9–12.5)
PS: 10
PV MEAN GRADIENT: 1 MMHG
RA MAJOR: 4.52 CM
RA WIDTH: 3.56 CM
RBC # BLD AUTO: 2.68 M/UL (ref 4.6–6.2)
RIGHT VENTRICULAR END-DIASTOLIC DIMENSION: 3.22 CM
SAMPLE: ABNORMAL
SAMPLE: NORMAL
SINUS: 3.22 CM
SITE: ABNORMAL
SITE: NORMAL
SODIUM BLD-SCNC: 135 MMOL/L (ref 136–145)
SODIUM BLD-SCNC: 136 MMOL/L (ref 136–145)
SODIUM SERPL-SCNC: 134 MMOL/L (ref 136–145)
SODIUM SERPL-SCNC: 135 MMOL/L (ref 136–145)
SODIUM SERPL-SCNC: 135 MMOL/L (ref 136–145)
SODIUM SERPL-SCNC: 136 MMOL/L (ref 136–145)
SODIUM SERPL-SCNC: 137 MMOL/L (ref 136–145)
SP02: 100
SP02: 100
SPECIMEN OUTDATE: NORMAL
SPECIMEN SOURCE: NORMAL
SPHEROCYTES BLD QL SMEAR: ABNORMAL
SPONT RATE: 21
STJ: 3.41 CM
TARGETS BLD QL SMEAR: ABNORMAL
TDI LATERAL: 0.06 M/S
TDI SEPTAL: 0.04 M/S
TDI: 0.05 M/S
TRICUSPID ANNULAR PLANE SYSTOLIC EXCURSION: 0.87 CM
VANCOMYCIN SERPL-MCNC: 14.1 UG/ML
VOL: 451
VT: 450
WBC # BLD AUTO: 27.7 K/UL (ref 3.9–12.7)
Z-SCORE OF LEFT VENTRICULAR DIMENSION IN END DIASTOLE: 0.49
Z-SCORE OF LEFT VENTRICULAR DIMENSION IN END SYSTOLE: 3.69

## 2023-12-11 PROCEDURE — 85610 PROTHROMBIN TIME: CPT | Performed by: THORACIC SURGERY (CARDIOTHORACIC VASCULAR SURGERY)

## 2023-12-11 PROCEDURE — 83880 ASSAY OF NATRIURETIC PEPTIDE: CPT | Performed by: STUDENT IN AN ORGANIZED HEALTH CARE EDUCATION/TRAINING PROGRAM

## 2023-12-11 PROCEDURE — 83735 ASSAY OF MAGNESIUM: CPT | Mod: 91 | Performed by: THORACIC SURGERY (CARDIOTHORACIC VASCULAR SURGERY)

## 2023-12-11 PROCEDURE — 83735 ASSAY OF MAGNESIUM: CPT | Mod: 91 | Performed by: STUDENT IN AN ORGANIZED HEALTH CARE EDUCATION/TRAINING PROGRAM

## 2023-12-11 PROCEDURE — 63600175 PHARM REV CODE 636 W HCPCS: Performed by: STUDENT IN AN ORGANIZED HEALTH CARE EDUCATION/TRAINING PROGRAM

## 2023-12-11 PROCEDURE — 25000003 PHARM REV CODE 250

## 2023-12-11 PROCEDURE — 94799 UNLISTED PULMONARY SVC/PX: CPT

## 2023-12-11 PROCEDURE — 85730 THROMBOPLASTIN TIME PARTIAL: CPT | Mod: 91 | Performed by: THORACIC SURGERY (CARDIOTHORACIC VASCULAR SURGERY)

## 2023-12-11 PROCEDURE — 25000003 PHARM REV CODE 250: Performed by: HOSPITALIST

## 2023-12-11 PROCEDURE — 85384 FIBRINOGEN ACTIVITY: CPT | Performed by: THORACIC SURGERY (CARDIOTHORACIC VASCULAR SURGERY)

## 2023-12-11 PROCEDURE — 27000646 HC AEROBIKA DEVICE

## 2023-12-11 PROCEDURE — 85610 PROTHROMBIN TIME: CPT | Mod: 91 | Performed by: THORACIC SURGERY (CARDIOTHORACIC VASCULAR SURGERY)

## 2023-12-11 PROCEDURE — 63600175 PHARM REV CODE 636 W HCPCS

## 2023-12-11 PROCEDURE — 63600367 HC NITRIC OXIDE PER HOUR

## 2023-12-11 PROCEDURE — 99900035 HC TECH TIME PER 15 MIN (STAT)

## 2023-12-11 PROCEDURE — 83615 LACTATE (LD) (LDH) ENZYME: CPT | Performed by: STUDENT IN AN ORGANIZED HEALTH CARE EDUCATION/TRAINING PROGRAM

## 2023-12-11 PROCEDURE — 83735 ASSAY OF MAGNESIUM: CPT

## 2023-12-11 PROCEDURE — 87205 SMEAR GRAM STAIN: CPT | Performed by: INTERNAL MEDICINE

## 2023-12-11 PROCEDURE — 85025 COMPLETE CBC W/AUTO DIFF WBC: CPT | Performed by: THORACIC SURGERY (CARDIOTHORACIC VASCULAR SURGERY)

## 2023-12-11 PROCEDURE — 82330 ASSAY OF CALCIUM: CPT | Mod: 91 | Performed by: STUDENT IN AN ORGANIZED HEALTH CARE EDUCATION/TRAINING PROGRAM

## 2023-12-11 PROCEDURE — 97535 SELF CARE MNGMENT TRAINING: CPT

## 2023-12-11 PROCEDURE — 25000003 PHARM REV CODE 250: Performed by: INTERNAL MEDICINE

## 2023-12-11 PROCEDURE — C9113 INJ PANTOPRAZOLE SODIUM, VIA: HCPCS

## 2023-12-11 PROCEDURE — 85730 THROMBOPLASTIN TIME PARTIAL: CPT | Performed by: THORACIC SURGERY (CARDIOTHORACIC VASCULAR SURGERY)

## 2023-12-11 PROCEDURE — 63600175 PHARM REV CODE 636 W HCPCS: Performed by: INTERNAL MEDICINE

## 2023-12-11 PROCEDURE — 63600175 PHARM REV CODE 636 W HCPCS: Performed by: THORACIC SURGERY (CARDIOTHORACIC VASCULAR SURGERY)

## 2023-12-11 PROCEDURE — 80069 RENAL FUNCTION PANEL: CPT

## 2023-12-11 PROCEDURE — 99232 SBSQ HOSP IP/OBS MODERATE 35: CPT | Mod: ,,, | Performed by: PSYCHIATRY & NEUROLOGY

## 2023-12-11 PROCEDURE — 92610 EVALUATE SWALLOWING FUNCTION: CPT

## 2023-12-11 PROCEDURE — 85384 FIBRINOGEN ACTIVITY: CPT | Mod: 91 | Performed by: THORACIC SURGERY (CARDIOTHORACIC VASCULAR SURGERY)

## 2023-12-11 PROCEDURE — 27000248 HC VAD-ADDITIONAL DAY

## 2023-12-11 PROCEDURE — 27000221 HC OXYGEN, UP TO 24 HOURS

## 2023-12-11 PROCEDURE — 83050 HGB METHEMOGLOBIN QUAN: CPT

## 2023-12-11 PROCEDURE — 25000003 PHARM REV CODE 250: Performed by: STUDENT IN AN ORGANIZED HEALTH CARE EDUCATION/TRAINING PROGRAM

## 2023-12-11 PROCEDURE — 90945 DIALYSIS ONE EVALUATION: CPT | Mod: ,,, | Performed by: INTERNAL MEDICINE

## 2023-12-11 PROCEDURE — 90945 DIALYSIS ONE EVALUATION: CPT

## 2023-12-11 PROCEDURE — 94761 N-INVAS EAR/PLS OXIMETRY MLT: CPT | Mod: XB

## 2023-12-11 PROCEDURE — 80100008 HC CRRT DAILY MAINTENANCE

## 2023-12-11 PROCEDURE — 86140 C-REACTIVE PROTEIN: CPT | Performed by: STUDENT IN AN ORGANIZED HEALTH CARE EDUCATION/TRAINING PROGRAM

## 2023-12-11 PROCEDURE — 84132 ASSAY OF SERUM POTASSIUM: CPT | Performed by: THORACIC SURGERY (CARDIOTHORACIC VASCULAR SURGERY)

## 2023-12-11 PROCEDURE — 80076 HEPATIC FUNCTION PANEL: CPT | Performed by: STUDENT IN AN ORGANIZED HEALTH CARE EDUCATION/TRAINING PROGRAM

## 2023-12-11 PROCEDURE — 84100 ASSAY OF PHOSPHORUS: CPT | Performed by: THORACIC SURGERY (CARDIOTHORACIC VASCULAR SURGERY)

## 2023-12-11 PROCEDURE — 27000207 HC ISOLATION

## 2023-12-11 PROCEDURE — 80053 COMPREHEN METABOLIC PANEL: CPT | Performed by: STUDENT IN AN ORGANIZED HEALTH CARE EDUCATION/TRAINING PROGRAM

## 2023-12-11 PROCEDURE — 80069 RENAL FUNCTION PANEL: CPT | Mod: 91 | Performed by: STUDENT IN AN ORGANIZED HEALTH CARE EDUCATION/TRAINING PROGRAM

## 2023-12-11 PROCEDURE — 94664 DEMO&/EVAL PT USE INHALER: CPT

## 2023-12-11 PROCEDURE — 82803 BLOOD GASES ANY COMBINATION: CPT

## 2023-12-11 PROCEDURE — 25500020 PHARM REV CODE 255: Performed by: THORACIC SURGERY (CARDIOTHORACIC VASCULAR SURGERY)

## 2023-12-11 PROCEDURE — 86901 BLOOD TYPING SEROLOGIC RH(D): CPT

## 2023-12-11 PROCEDURE — 83605 ASSAY OF LACTIC ACID: CPT

## 2023-12-11 PROCEDURE — 84145 PROCALCITONIN (PCT): CPT | Performed by: INTERNAL MEDICINE

## 2023-12-11 PROCEDURE — 99291 CRITICAL CARE FIRST HOUR: CPT | Mod: ,,, | Performed by: INTERNAL MEDICINE

## 2023-12-11 PROCEDURE — 93750 INTERROGATION VAD IN PERSON: CPT | Mod: ,,, | Performed by: INTERNAL MEDICINE

## 2023-12-11 PROCEDURE — 99291 CRITICAL CARE FIRST HOUR: CPT | Mod: ,,, | Performed by: STUDENT IN AN ORGANIZED HEALTH CARE EDUCATION/TRAINING PROGRAM

## 2023-12-11 PROCEDURE — 85730 THROMBOPLASTIN TIME PARTIAL: CPT | Mod: 91

## 2023-12-11 PROCEDURE — 80202 ASSAY OF VANCOMYCIN: CPT | Performed by: THORACIC SURGERY (CARDIOTHORACIC VASCULAR SURGERY)

## 2023-12-11 PROCEDURE — 87070 CULTURE OTHR SPECIMN AEROBIC: CPT | Performed by: INTERNAL MEDICINE

## 2023-12-11 PROCEDURE — 25000003 PHARM REV CODE 250: Performed by: THORACIC SURGERY (CARDIOTHORACIC VASCULAR SURGERY)

## 2023-12-11 PROCEDURE — 20000000 HC ICU ROOM

## 2023-12-11 PROCEDURE — 99292 CRITICAL CARE ADDL 30 MIN: CPT | Mod: FS,,, | Performed by: PHYSICIAN ASSISTANT

## 2023-12-11 PROCEDURE — 37799 UNLISTED PX VASCULAR SURGERY: CPT

## 2023-12-11 PROCEDURE — 94668 MNPJ CHEST WALL SBSQ: CPT

## 2023-12-11 RX ORDER — MAGNESIUM SULFATE HEPTAHYDRATE 40 MG/ML
2 INJECTION, SOLUTION INTRAVENOUS
Status: DISPENSED | OUTPATIENT
Start: 2023-12-11 | End: 2023-12-12

## 2023-12-11 RX ORDER — POLYETHYLENE GLYCOL 3350 17 G/17G
17 POWDER, FOR SOLUTION ORAL DAILY
Status: DISCONTINUED | OUTPATIENT
Start: 2023-12-11 | End: 2024-01-13

## 2023-12-11 RX ORDER — INSULIN ASPART 100 [IU]/ML
0-10 INJECTION, SOLUTION INTRAVENOUS; SUBCUTANEOUS EVERY 4 HOURS PRN
Status: DISCONTINUED | OUTPATIENT
Start: 2023-12-11 | End: 2023-12-31

## 2023-12-11 RX ORDER — AMOXICILLIN 250 MG
1 CAPSULE ORAL DAILY
Status: DISCONTINUED | OUTPATIENT
Start: 2023-12-11 | End: 2023-12-19

## 2023-12-11 RX ADMIN — HEPARIN SODIUM 1000 UNITS/HR: 10000 INJECTION, SOLUTION INTRAVENOUS at 02:12

## 2023-12-11 RX ADMIN — MIDODRINE HYDROCHLORIDE 15 MG: 5 TABLET ORAL at 09:12

## 2023-12-11 RX ADMIN — SODIUM PHOSPHATE, MONOBASIC, MONOHYDRATE AND SODIUM PHOSPHATE, DIBASIC, ANHYDROUS 39.99 MMOL: 142; 276 INJECTION, SOLUTION INTRAVENOUS at 08:12

## 2023-12-11 RX ADMIN — DEXTROSE MONOHYDRATE, SODIUM CITRATE, AND CITRIC ACID MONOHYDRATE: 2.45; 2.2; .8 INJECTION, SOLUTION INTRAVENOUS at 03:12

## 2023-12-11 RX ADMIN — INSULIN ASPART 7 UNITS: 100 INJECTION, SOLUTION INTRAVENOUS; SUBCUTANEOUS at 12:12

## 2023-12-11 RX ADMIN — MIDODRINE HYDROCHLORIDE 15 MG: 5 TABLET ORAL at 03:12

## 2023-12-11 RX ADMIN — MAGNESIUM SULFATE HEPTAHYDRATE 2 G: 40 INJECTION, SOLUTION INTRAVENOUS at 12:12

## 2023-12-11 RX ADMIN — DEXTROSE MONOHYDRATE, SODIUM CITRATE, AND CITRIC ACID MONOHYDRATE: 2.45; 2.2; .8 INJECTION, SOLUTION INTRAVENOUS at 01:12

## 2023-12-11 RX ADMIN — CALCIUM GLUCONATE: 98 INJECTION, SOLUTION INTRAVENOUS at 01:12

## 2023-12-11 RX ADMIN — HUMAN ALBUMIN MICROSPHERES AND PERFLUTREN 0.11 MG: 10; .22 INJECTION, SOLUTION INTRAVENOUS at 09:12

## 2023-12-11 RX ADMIN — AMIODARONE HYDROCHLORIDE 400 MG: 200 TABLET ORAL at 09:12

## 2023-12-11 RX ADMIN — INSULIN ASPART 7 UNITS: 100 INJECTION, SOLUTION INTRAVENOUS; SUBCUTANEOUS at 07:12

## 2023-12-11 RX ADMIN — INSULIN ASPART 7 UNITS: 100 INJECTION, SOLUTION INTRAVENOUS; SUBCUTANEOUS at 11:12

## 2023-12-11 RX ADMIN — HYDROCORTISONE SODIUM SUCCINATE 100 MG: 100 INJECTION, POWDER, FOR SOLUTION INTRAMUSCULAR; INTRAVENOUS at 11:12

## 2023-12-11 RX ADMIN — MEROPENEM 2 G: 1 INJECTION INTRAVENOUS at 11:12

## 2023-12-11 RX ADMIN — WARFARIN SODIUM 2 MG: 2 TABLET ORAL at 05:12

## 2023-12-11 RX ADMIN — MEROPENEM 2 G: 1 INJECTION INTRAVENOUS at 03:12

## 2023-12-11 RX ADMIN — INSULIN ASPART 7 UNITS: 100 INJECTION, SOLUTION INTRAVENOUS; SUBCUTANEOUS at 03:12

## 2023-12-11 RX ADMIN — ATORVASTATIN CALCIUM 40 MG: 10 TABLET, FILM COATED ORAL at 09:12

## 2023-12-11 RX ADMIN — INSULIN ASPART 2 UNITS: 100 INJECTION, SOLUTION INTRAVENOUS; SUBCUTANEOUS at 12:12

## 2023-12-11 RX ADMIN — HYDROCORTISONE SODIUM SUCCINATE 100 MG: 100 INJECTION, POWDER, FOR SOLUTION INTRAMUSCULAR; INTRAVENOUS at 09:12

## 2023-12-11 RX ADMIN — CALCIUM GLUCONATE: 98 INJECTION, SOLUTION INTRAVENOUS at 05:12

## 2023-12-11 RX ADMIN — HEPARIN SODIUM 1200 UNITS/HR: 10000 INJECTION, SOLUTION INTRAVENOUS at 11:12

## 2023-12-11 RX ADMIN — CALCIUM GLUCONATE: 98 INJECTION, SOLUTION INTRAVENOUS at 04:12

## 2023-12-11 RX ADMIN — PANTOPRAZOLE SODIUM 40 MG: 40 INJECTION, POWDER, FOR SOLUTION INTRAVENOUS at 09:12

## 2023-12-11 RX ADMIN — VASOPRESSIN 0.04 UNITS/MIN: 20 INJECTION INTRAVENOUS at 06:12

## 2023-12-11 RX ADMIN — MICAFUNGIN SODIUM 100 MG: 100 INJECTION, POWDER, LYOPHILIZED, FOR SOLUTION INTRAVENOUS at 05:12

## 2023-12-11 RX ADMIN — INSULIN ASPART 7 UNITS: 100 INJECTION, SOLUTION INTRAVENOUS; SUBCUTANEOUS at 04:12

## 2023-12-11 RX ADMIN — CALCIUM GLUCONATE: 98 INJECTION, SOLUTION INTRAVENOUS at 08:12

## 2023-12-11 RX ADMIN — VANCOMYCIN HYDROCHLORIDE 1250 MG: 1.25 INJECTION, POWDER, LYOPHILIZED, FOR SOLUTION INTRAVENOUS at 09:12

## 2023-12-11 RX ADMIN — CALCIUM GLUCONATE: 98 INJECTION, SOLUTION INTRAVENOUS at 09:12

## 2023-12-11 RX ADMIN — INSULIN ASPART 2 UNITS: 100 INJECTION, SOLUTION INTRAVENOUS; SUBCUTANEOUS at 04:12

## 2023-12-11 RX ADMIN — MAGNESIUM SULFATE HEPTAHYDRATE 2 G: 40 INJECTION, SOLUTION INTRAVENOUS at 07:12

## 2023-12-11 RX ADMIN — MEROPENEM 2 G: 1 INJECTION, POWDER, FOR SOLUTION INTRAVENOUS at 06:12

## 2023-12-11 NOTE — CONSULTS
"CONSULTATION LIAISON PSYCHIATRY PROGRESS NOTE    Patient Name: Radha Abbott  MRN: [id number]  Patient Class: IP- Inpatient  Admission Date: 11/9/2023  Attending Physician: Yuri Washington MD      SUBJECTIVE:   Patient is a 63-year-old man with Ischemic Cardiomyopathy (EF 10 -15%) and G3DD status post [employer] ICM placement on chronic dobutamine infusion, Coronary Artery Disease  status post x3 vessel Coronary Artery Bypass Graft back in 2009, Type 2 Diabetes Mellitus (most recent hemoglobin A1c 7.6%), Hypertension, Hyperlipidemia, History of Ventricular Fibrillation for which he is on amiodarone and Chronic Kidney Disease IIIb (baseline creatinine ~2-2.2) who was initially admitted on 11/9 with Congestive Heart Failure exacerbation and hypervolemia for which he was managed with with dobutamine and IV diuresis. He ultimately underwent IABP placed on 11/21 for mechanical hemodynamic support and subsequently underwent Left Ventricular Assistive Device placement on 12/1. Patient has been intubated twice, now extubated since 12/9 and has been voicing passive suicidal ideation.     Psychiatry consulted for "Expressed desire to 'meet his maker', s/p LVAD"    Patient was report to be "increasingly appearing ill" in nursing notes overnight. Patient's vasopressors were increased. Micafungin, Meropenem, Vancomycin, and 1 unit of packed red blood cells were ordered and administered.     He did not require any prn medications for agitation or aggression. He was compliant with scheduled medications.      Today, patient is greeted at the bedside. He is laying in bed in no apparent distress. He is ill appearing and speaks softly due to recent intubation and has a wet cough, frequently suctioning his mouth.     He states his mood today is "okay". He states the he gets very anxious when his wife leaves the room. His wife was at the bedside. She states that she left for 2 hours yesterday, but had his brother sit with him while she was " "gone. He denies any feelings of depression, he denies any suicidal ideation, homicidal ideation, auditory hallucinations, visual hallucinations, or paranoia at this time.     OBJECTIVE:    Mental Status Exam:  General Appearance: dressed in hospital garb, lying in bed, thin and gaunt, ill appearing  Behavior: cooperative, appropriate eye-contact  Involuntary Movements and Motor Activity: no abnormal involuntary movements noted  Gait and Station: unable to assess - patient lying down or seated  Speech and Language: decreased spontaneity, increased latency of response, paucity of speech, soft  Mood: "okay"  Affect: flat  Thought Process and Associations: linear, goal-directed, organized  Thought Content and Perceptions:: no suicidal or homicidal ideation, no auditory or visual hallucinations, no paranoid ideation, no ideas of reference, no evidence of delusions or psychosis  Sensorium and Orientation: grossly intact  Recent and Remote Memory: mild impairments noted  Attention and Concentration: grossly intact, attentive to conversation  Fund of Knowledge: grossly intact  Insight: limited/partial awareness of illness  Judgment: behavior is adequate/appropriate to circumstances    CAM ICU positive? no      ASSESSMENT & RECOMMENDATIONS   Adjustment Disorder, acute, with depressed mood      Previously recommended consult with Psychology, follow up  No indication for medications at this time for this acute situational anxiety, does not endorse depression or suicidal ideation at this time     RISK ASSESSMENT     NO NEED FOR PEC patient NOT in any imminent danger of hurting self or others and not gravely disabled.   Continue to recommend sitter when patient's family not at bedside for extended time      FOLLOW UP  Will follow up while in house     DISPOSITION - once medically cleared:    Defer to medical team    Please contact ON CALL psychiatry service (24/7) for any acute issues that may arise.    Dr. Sayda Oliva  CL " Psychiatry  [facility]  12/11/2023 9:36 AM        --------------------------------------------------------------------------------------------------------------------------------------------------------------------------------------------------------------------------------------    CONTINUED OBJECTIVE clinical data & findings reviewed and noted for above decision making    Current Medications:   Scheduled Meds:    amiodarone  400 mg Per NG tube Daily    atorvastatin  40 mg Per NG tube Daily    insulin aspart U-100  7 Units Subcutaneous 6 times per day    meropenem (MERREM) IVPB  2 g Intravenous Q12H    micafungin (MYCAMINE) IVPB  100 mg Intravenous Q24H    midodrine  15 mg Oral TID    pantoprazole  40 mg Intravenous BID    polyethylene glycol  17 g Per NG tube Daily    senna-docusate 8.6-50 mg  1 tablet Per NG tube Daily    vancomycin (VANCOCIN) IV (PEDS and ADULTS)  1,250 mg Intravenous Once    warfarin  2 mg Per NG tube Daily     PRN Meds: 0.9%  NaCl infusion (for blood administration), acetaminophen, albumin human 5%, albuterol sulfate, bisacodyL, dextrose 10 % in water (D10W), dextrose 10%, dextrose 10%, glucagon (human recombinant), insulin aspart U-100, magnesium hydroxide 400 mg/5 ml, potassium chloride, potassium chloride, QUEtiapine, Flushing PICC/Midline Protocol **AND** [DISCONTINUED] sodium chloride 0.9% **AND** sodium chloride 0.9%, Pharmacy to dose Vancomycin consult **AND** vancomycin - pharmacy to dose    Allergies:   Review of patient's allergies indicates:  No Known Allergies    Vitals  Vitals:    12/11/23 0730   BP:    Pulse: 90   Resp:    Temp: 97.7 °F (36.5 °C)       Labs/Imaging/Studies:  Recent Results (from the past 24 hour(s))   Fibrinogen    Collection Time: 12/10/23 11:27 AM   Result Value Ref Range    Fibrinogen 697 (H) 182 - 400 mg/dL   Protime-INR    Collection Time: 12/10/23 11:27 AM   Result Value Ref Range    Prothrombin Time 11.9 9.0 - 12.5 sec    INR 1.1 0.8 -  1.2   Potassium    Collection Time: 12/10/23 11:27 AM   Result Value Ref Range    Potassium 4.4 3.5 - 5.1 mmol/L   Magnesium    Collection Time: 12/10/23 11:27 AM   Result Value Ref Range    Magnesium 2.1 1.6 - 2.6 mg/dL   APTT    Collection Time: 12/10/23 11:27 AM   Result Value Ref Range    aPTT 29.2 21.0 - 32.0 sec   POCT glucose    Collection Time: 12/10/23 11:31 AM   Result Value Ref Range    POCT Glucose 188 (H) 70 - 110 mg/dL   Blood culture    Collection Time: 12/10/23 11:49 AM    Specimen: Line, Jugular, External Right; Blood   Result Value Ref Range    Blood Culture, Routine No Growth to date    Blood culture    Collection Time: 12/10/23 12:40 PM    Specimen: Peripheral, Antecubital, Right; Blood   Result Value Ref Range    Blood Culture, Routine No Growth to date    Renal function panel    Collection Time: 12/10/23  1:42 PM   Result Value Ref Range    Glucose 161 (H) 70 - 110 mg/dL    Sodium 138 136 - 145 mmol/L    Potassium 4.5 3.5 - 5.1 mmol/L    Chloride 104 95 - 110 mmol/L    CO2 22 (L) 23 - 29 mmol/L    BUN 5 (L) 8 - 23 mg/dL    Calcium 8.6 (L) 8.7 - 10.5 mg/dL    Creatinine 0.7 0.5 - 1.4 mg/dL    Albumin 2.2 (L) 3.5 - 5.2 g/dL    Phosphorus 1.2 (L) 2.7 - 4.5 mg/dL    eGFR >60.0 >60 mL/min/1.73 m^2    Anion Gap 12 8 - 16 mmol/L   Magnesium    Collection Time: 12/10/23  1:42 PM   Result Value Ref Range    Magnesium 2.0 1.6 - 2.6 mg/dL   Renal function panel    Collection Time: 12/10/23  1:42 PM   Result Value Ref Range    Glucose 161 (H) 70 - 110 mg/dL    Sodium 138 136 - 145 mmol/L    Potassium 4.5 3.5 - 5.1 mmol/L    Chloride 104 95 - 110 mmol/L    CO2 22 (L) 23 - 29 mmol/L    BUN 5 (L) 8 - 23 mg/dL    Calcium 8.6 (L) 8.7 - 10.5 mg/dL    Creatinine 0.7 0.5 - 1.4 mg/dL    Albumin 2.2 (L) 3.5 - 5.2 g/dL    Phosphorus 1.2 (L) 2.7 - 4.5 mg/dL    eGFR >60.0 >60 mL/min/1.73 m^2    Anion Gap 12 8 - 16 mmol/L   Magnesium    Collection Time: 12/10/23  1:42 PM   Result Value Ref Range    Magnesium 2.0 1.6 -  2.6 mg/dL   POCT glucose    Collection Time: 12/10/23  3:58 PM   Result Value Ref Range    POCT Glucose 187 (H) 70 - 110 mg/dL   ISTAT PROCEDURE    Collection Time: 12/10/23  3:59 PM   Result Value Ref Range    POC PH 7.408 7.35 - 7.45    POC PCO2 27.7 (LL) 35 - 45 mmHg    POC PO2 139 (H) 80 - 100 mmHg    POC HCO3 17.5 (L) 24 - 28 mmol/L    POC BE -7 (L) -2 to 2 mmol/L    POC SATURATED O2 99 95 - 100 %    POC TCO2 18 (L) 23 - 27 mmol/L    Sample ARTERIAL     Site [place]/[facility]     Allens Test N/A     DelSys Nasal Can     Mode SPONT     Flow 5    ISTAT Lactate    Collection Time: 12/10/23  4:00 PM   Result Value Ref Range    POC Lactate 0.40 0.36 - 1.25 mmol/L    Sample ARTERIAL     Site [place]/[facility]     Allens Test N/A     DelSys Nasal Can     Mode SPONT     Flow 5    Fibrinogen    Collection Time: 12/10/23  5:15 PM   Result Value Ref Range    Fibrinogen 657 (H) 182 - 400 mg/dL   Protime-INR    Collection Time: 12/10/23  5:15 PM   Result Value Ref Range    Prothrombin Time 12.4 9.0 - 12.5 sec    INR 1.2 0.8 - 1.2   Potassium    Collection Time: 12/10/23  5:15 PM   Result Value Ref Range    Potassium 4.4 3.5 - 5.1 mmol/L   Magnesium    Collection Time: 12/10/23  5:15 PM   Result Value Ref Range    Magnesium 1.9 1.6 - 2.6 mg/dL   APTT    Collection Time: 12/10/23  5:15 PM   Result Value Ref Range    aPTT 27.1 21.0 - 32.0 sec   POCT glucose    Collection Time: 12/10/23  8:14 PM   Result Value Ref Range    POCT Glucose 189 (H) 70 - 110 mg/dL   ISTAT Lactate    Collection Time: 12/10/23  9:07 PM   Result Value Ref Range    POC Lactate 0.59 0.36 - 1.25 mmol/L    Sample ARTERIAL     Site [place]/[facility]     Allens Test N/A     DelSys Nasal Can    ISTAT PROCEDURE    Collection Time: 12/10/23  9:07 PM   Result Value Ref Range    POC PH 7.469 (H) 7.35 - 7.45    POC PCO2 29.4 (L) 35 - 45 mmHg    POC PO2 159 (H) 80 - 100 mmHg    POC HCO3 21.4 (L) 24 - 28 mmol/L    POC BE -2 -2 to 2 mmol/L    POC SATURATED O2 100 95 - 100 %    POC TCO2 22  (L) 23 - 27 mmol/L    Sample ARTERIAL     Site [place]/[facility]     Allens Test N/A     DelSys Nasal Can    Magnesium    Collection Time: 12/10/23 10:04 PM   Result Value Ref Range    Magnesium 1.8 1.6 - 2.6 mg/dL   Renal function panel    Collection Time: 12/10/23 10:04 PM   Result Value Ref Range    Glucose 230 (H) 70 - 110 mg/dL    Sodium 134 (L) 136 - 145 mmol/L    Potassium 3.9 3.5 - 5.1 mmol/L    Chloride 103 95 - 110 mmol/L    CO2 18 (L) 23 - 29 mmol/L    BUN 7 (L) 8 - 23 mg/dL    Calcium 8.2 (L) 8.7 - 10.5 mg/dL    Creatinine 0.7 0.5 - 1.4 mg/dL    Albumin 2.0 (L) 3.5 - 5.2 g/dL    Phosphorus 2.1 (L) 2.7 - 4.5 mg/dL    eGFR >60.0 >60 mL/min/1.73 m^2    Anion Gap 13 8 - 16 mmol/L   POCT glucose    Collection Time: 12/11/23 12:11 AM   Result Value Ref Range    POCT Glucose 206 (H) 70 - 110 mg/dL   Fibrinogen    Collection Time: 12/11/23 12:14 AM   Result Value Ref Range    Fibrinogen 684 (H) 182 - 400 mg/dL   Protime-INR    Collection Time: 12/11/23 12:14 AM   Result Value Ref Range    Prothrombin Time 12.7 (H) 9.0 - 12.5 sec    INR 1.2 0.8 - 1.2   APTT    Collection Time: 12/11/23 12:14 AM   Result Value Ref Range    aPTT 36.5 (H) 21.0 - 32.0 sec   POCT glucose    Collection Time: 12/11/23  4:04 AM   Result Value Ref Range    POCT Glucose 194 (H) 70 - 110 mg/dL   Phosphorus    Collection Time: 12/11/23  4:05 AM   Result Value Ref Range    Phosphorus 2.5 (L) 2.7 - 4.5 mg/dL   Lactate dehydrogenase    Collection Time: 12/11/23  4:05 AM   Result Value Ref Range     (H) 110 - 260 U/L   Brain natriuretic peptide    Collection Time: 12/11/23  4:05 AM   Result Value Ref Range    BNP 2,154 (H) 0 - 99 pg/mL   C-reactive protein    Collection Time: 12/11/23  4:05 AM   Result Value Ref Range    .6 (H) 0.0 - 8.2 mg/L   Hepatic function panel    Collection Time: 12/11/23  4:05 AM   Result Value Ref Range    Total Protein 6.5 6.0 - 8.4 g/dL    Albumin 2.1 (L) 3.5 - 5.2 g/dL    Total Bilirubin 1.3 (H) 0.1 - 1.0 mg/dL     Bilirubin, Direct 0.6 (H) 0.1 - 0.3 mg/dL    AST 47 (H) 10 - 40 U/L    ALT 27 10 - 44 U/L    Alkaline Phosphatase 174 (H) 55 - 135 U/L   Comprehensive metabolic panel    Collection Time: 12/11/23  4:05 AM   Result Value Ref Range    Sodium 136 136 - 145 mmol/L    Potassium 4.1 3.5 - 5.1 mmol/L    Chloride 103 95 - 110 mmol/L    CO2 20 (L) 23 - 29 mmol/L    Glucose 199 (H) 70 - 110 mg/dL    BUN 8 8 - 23 mg/dL    Creatinine 0.8 0.5 - 1.4 mg/dL    Calcium 8.8 8.7 - 10.5 mg/dL    Total Protein 6.5 6.0 - 8.4 g/dL    Albumin 2.1 (L) 3.5 - 5.2 g/dL    Total Bilirubin 1.4 (H) 0.1 - 1.0 mg/dL    Alkaline Phosphatase 174 (H) 55 - 135 U/L    AST 47 (H) 10 - 40 U/L    ALT 27 10 - 44 U/L    eGFR >60.0 >60 mL/min/1.73 m^2    Anion Gap 13 8 - 16 mmol/L   Fibrinogen    Collection Time: 12/11/23  4:05 AM   Result Value Ref Range    Fibrinogen 697 (H) 182 - 400 mg/dL   Protime-INR    Collection Time: 12/11/23  4:05 AM   Result Value Ref Range    Prothrombin Time 12.7 (H) 9.0 - 12.5 sec    INR 1.2 0.8 - 1.2   CBC auto differential    Collection Time: 12/11/23  4:05 AM   Result Value Ref Range    WBC 27.70 (H) 3.90 - 12.70 K/uL    RBC 2.68 (L) 4.60 - 6.20 M/uL    Hemoglobin 7.7 (L) 14.0 - 18.0 g/dL    Hematocrit 23.2 (L) 40.0 - 54.0 %    MCV 87 82 - 98 fL    MCH 28.7 27.0 - 31.0 pg    MCHC 33.2 32.0 - 36.0 g/dL    RDW 17.2 (H) 11.5 - 14.5 %    Platelets 287 150 - 450 K/uL    MPV 11.8 9.2 - 12.9 fL    Immature Granulocytes 2.9 (H) 0.0 - 0.5 %    Gran # (ANC) 24.4 (H) 1.8 - 7.7 K/uL    Immature Grans (Abs) 0.79 (H) 0.00 - 0.04 K/uL    Lymph # 1.0 1.0 - 4.8 K/uL    Mono # 1.4 (H) 0.3 - 1.0 K/uL    Eos # 0.1 0.0 - 0.5 K/uL    Baso # 0.08 0.00 - 0.20 K/uL    nRBC 2 (A) 0 /100 WBC    Gran % 88.0 (H) 38.0 - 73.0 %    Lymph % 3.6 (L) 18.0 - 48.0 %    Mono % 5.0 4.0 - 15.0 %    Eosinophil % 0.2 0.0 - 8.0 %    Basophil % 0.3 0.0 - 1.9 %    Aniso Slight     Poik Slight     Poly Occasional     Hypo Occasional     Ovalocytes Occasional      Target Cells Occasional     Spherocytes Occasional     Differential Method Automated    Renal function panel    Collection Time: 12/11/23  4:05 AM   Result Value Ref Range    Glucose 199 (H) 70 - 110 mg/dL    Sodium 135 (L) 136 - 145 mmol/L    Potassium 4.2 3.5 - 5.1 mmol/L    Chloride 103 95 - 110 mmol/L    CO2 20 (L) 23 - 29 mmol/L    BUN 7 (L) 8 - 23 mg/dL    Calcium 8.7 8.7 - 10.5 mg/dL    Creatinine 0.8 0.5 - 1.4 mg/dL    Albumin 2.1 (L) 3.5 - 5.2 g/dL    Phosphorus 2.5 (L) 2.7 - 4.5 mg/dL    eGFR >60.0 >60 mL/min/1.73 m^2    Anion Gap 12 8 - 16 mmol/L   Magnesium    Collection Time: 12/11/23  4:05 AM   Result Value Ref Range    Magnesium 2.0 1.6 - 2.6 mg/dL   Renal function panel    Collection Time: 12/11/23  4:05 AM   Result Value Ref Range    Glucose 199 (H) 70 - 110 mg/dL    Sodium 135 (L) 136 - 145 mmol/L    Potassium 4.2 3.5 - 5.1 mmol/L    Chloride 103 95 - 110 mmol/L    CO2 20 (L) 23 - 29 mmol/L    BUN 7 (L) 8 - 23 mg/dL    Calcium 8.7 8.7 - 10.5 mg/dL    Creatinine 0.8 0.5 - 1.4 mg/dL    Albumin 2.1 (L) 3.5 - 5.2 g/dL    Phosphorus 2.5 (L) 2.7 - 4.5 mg/dL    eGFR >60.0 >60 mL/min/1.73 m^2    Anion Gap 12 8 - 16 mmol/L   Procalcitonin    Collection Time: 12/11/23  4:13 AM   Result Value Ref Range    Procalcitonin 6.57 (H) <0.25 ng/mL   APTT    Collection Time: 12/11/23  5:02 AM   Result Value Ref Range    aPTT 39.6 (H) 21.0 - 32.0 sec   Vancomycin, random    Collection Time: 12/11/23  5:02 AM   Result Value Ref Range    Vancomycin, Random 14.1 Not established ug/mL   ISTAT Lactate    Collection Time: 12/11/23  5:02 AM   Result Value Ref Range    POC Lactate 0.44 0.36 - 1.25 mmol/L    Sample ARTERIAL     Site [place]/[facility]     Allens Test N/A     DelSys Nasal Can    ISTAT PROCEDURE    Collection Time: 12/11/23  5:02 AM   Result Value Ref Range    POC PH 7.448 7.35 - 7.45    POC PCO2 35.9 35 - 45 mmHg    POC PO2 189 (H) 80 - 100 mmHg    POC HCO3 24.8 24 - 28 mmol/L    POC BE 1 -2 to 2 mmol/L    POC SATURATED  O2 100 95 - 100 %    POC TCO2 26 23 - 27 mmol/L    Sample ARTERIAL     Site [facility]     Allens Test N/A     DelSys Nasal Can    POCT METHEMOGLOBIN Q24H    Collection Time: 12/11/23  5:03 AM   Result Value Ref Range    Methemoglobin 0.7 0 - 3 %   POCT ARTERIAL BLOOD GAS    Collection Time: 12/11/23  5:03 AM   Result Value Ref Range    POC MetHb 0.7 0.0 - 3.0 %    POC Temp 37.0 C    Specimen source Arterial     Performed By: LTRAN     BIPAP 0    POCT glucose    Collection Time: 12/11/23  7:41 AM   Result Value Ref Range    POCT Glucose 168 (H) 70 - 110 mg/dL   Echo    Collection Time: 12/11/23  8:56 AM   Result Value Ref Range    BSA 1.99 m2    LVIDd 6.12 (A) 3.5 - 6.0 cm    LV Systolic Volume 160.70 mL    LV Systolic Volume Index 80.4 mL/m2    LVIDs 5.71 (A) 2.1 - 4.0 cm    LV Diastolic Volume 188.49 mL    LV Diastolic Volume Index 94.25 mL/m2    IVS 0.92 0.6 - 1.1 cm    LVOT diameter 2.03 cm    LVOT area 3.2 cm2    FS 7 (A) 28 - 44 %    Left Ventricle Relative Wall Thickness 0.32 cm    Posterior Wall 0.97 0.6 - 1.1 cm    LV mass 237.47 g    LV Mass Index 119 g/m2    MV Peak E Zeferino 0.63 m/s    TDI LATERAL 0.06 m/s    TDI SEPTAL 0.04 m/s    E/E' ratio 12.60 m/s    MV Peak A Zeferino 0.63 m/s    E/A ratio 1.00     E wave deceleration time 140.39 msec    LV SEPTAL E/E' RATIO 15.75 m/s    LA Volume Index 61.1 mL/m2    LV LATERAL E/E' RATIO 10.50 m/s    LA volume 122.29 cm3    RVDD 3.22 cm    RVOT peak VTI 11.60 cm    TAPSE 0.87 cm    LA size 4.05 cm    Left Atrium Minor Axis 6.24 cm    Left Atrium Major Axis 6.45 cm    LA volume (mod) 158.93 cm3    LA WIDTH 5.60 cm    LA Volume Index (Mod) 79.5 mL/m2    RA Major Axis 4.52 cm    RA Width 3.56 cm    MV stenosis pressure 1/2 time 40.71 ms    MV valve area p 1/2 method 5.40 cm2    PV mean gradient 1 mmHg    PV peak gradient 2     RVOT peak zeferino 0.72 m/s    Sinus 3.22 cm    STJ 3.41 cm    Ascending aorta 2.93 cm    Mean e' 0.05 m/s    ZLVIDS 3.69     ZLVIDD 0.49      Imaging  Results              X-Ray Chest AP Portable (Final result)  Result time 11/09/23 19:08:29      Final result by Norm Mccain MD (11/09/23 19:08:29)                   Impression:      As above      Electronically signed by: Norm Mccain MD  Date:    11/09/2023  Time:    19:08               Narrative:    EXAMINATION:  XR CHEST AP PORTABLE    CLINICAL HISTORY:  pulmonary edema;    TECHNIQUE:  Single frontal view of the chest was performed.    COMPARISON:  11/09/2023    FINDINGS:  Right PICC catheter tip projects over the mid to distal SVC.  Left chest wall pacer stable.  There is patchy increased interstitial and parenchymal attenuation bilaterally, similar to the previous examination allowing for shallow inspiratory effort on current exam.  There is no pneumothorax or significant interval detrimental change in the cardiopulmonary status since the previous exam.                                       X-Ray Chest AP Portable (Final result)  Result time 11/09/23 09:56:37      Final result by Jake Gupta MD (11/09/23 09:56:37)                   Impression:      The cardiopericardial silhouette remains enlarged.    Mild pulmonary venous hypertension, possibly related to CHF.    Additional findings as above.      Electronically signed by: Jake Gupta  Date:    11/09/2023  Time:    09:56               Narrative:    EXAMINATION:  XR CHEST AP PORTABLE    CLINICAL HISTORY:  CHF;    TECHNIQUE:  Single frontal view of the chest was performed.    COMPARISON:  Portable chest x-ray 10/30/2023    FINDINGS:  Midline thoracic trachea, allowing for rightward rotation of the patient's torso.    The cardiopericardial silhouette remains enlarged, similar to the comparison study.    Left subclavian transvenous ICD, sternotomy wires, and visualized portions of a right upper extremity PICC line demonstrate no adverse interval changes from 10/30/2023.    Mildly engorged pulmonary vasculature, especially in the upper lobes.   Although patient positioning could contribute to this, some underlying pulmonary venous hypertension is not excluded.    Hazy increased opacities projecting over either lower lung are favored to be related to superimposed chest wall soft tissues.  Some new underlying pulmonary or pleural abnormalities, such as mild pulmonary edema, is not excluded.    No discrete pneumothorax.    No blunting of either lateral costophrenic angle.    Degenerative changes in the suboptimally visualized spine.

## 2023-12-11 NOTE — PROGRESS NOTES
[facility]  Heart Transplant  Progress Note    Patient Name: Radha Abbott  MRN: [id number]  Admission Date: 11/9/2023  Hospital Length of Stay: 32 days  Attending Physician: Yuri Washington MD  Primary Care Provider: Vasu Kong MD  Principal Problem:Acute on chronic combined systolic and diastolic CHF, NYHA class 4    Subjective:   Interval History: Patient POD #11 s/p HM3 LVAD implant. Overnight pressor requirements up. Giapreza added along with levo, vaso, epi. Was complaining of RLQ abdominal pain around driveline. Stat CT c/a/p without acute abnormalities. Worsening leukocytosis, on broad spectrum abx.     Continuous Infusions:   sodium chloride 0.9% 5 mL/hr at 12/11/23 1300    angiotensin II (GIAPREZA) 2,500,000 ng in sodium chloride 0.9% 250 mL infusion 10 ng/kg/min (12/11/23 1300)    calcium gluconate 3 g in dextrose 5 % (D5W) 100 mL infusion 30 mL/hr at 12/11/23 1405    dextrose 10 % in water (D10W)      dextrose-sod citrate-citric ac 200 mL/hr at 12/11/23 1353    EPINEPHrine 0.04 mcg/kg/min (12/11/23 1300)    heparin (porcine) in 5 % dex 1,200 Units/hr (12/11/23 1300)    nitric oxide gas      NORepinephrine bitartrate-D5W 0.08 mcg/kg/min (12/11/23 1300)    vasopressin 0.04 Units/min (12/11/23 1300)     Scheduled Meds:   amiodarone  400 mg Per NG tube Daily    atorvastatin  40 mg Per NG tube Daily    hydrocortisone sodium succinate  100 mg Intravenous Q8H    insulin aspart U-100  7 Units Subcutaneous 6 times per day    meropenem (MERREM) IVPB  2 g Intravenous Q8H    micafungin (MYCAMINE) IVPB  100 mg Intravenous Q24H    midodrine  15 mg Oral TID    pantoprazole  40 mg Intravenous BID    polyethylene glycol  17 g Per NG tube Daily    senna-docusate 8.6-50 mg  1 tablet Per NG tube Daily    warfarin  2 mg Per NG tube Daily     PRN Meds:0.9%  NaCl infusion (for blood administration), acetaminophen, albumin human 5%, albuterol sulfate, bisacodyL, dextrose 10 % in water (D10W),  dextrose 10%, dextrose 10%, glucagon (human recombinant), insulin aspart U-100, magnesium hydroxide 400 mg/5 ml, magnesium sulfate IVPB, potassium chloride, potassium chloride, QUEtiapine, Flushing PICC/Midline Protocol **AND** [DISCONTINUED] sodium chloride 0.9% **AND** sodium chloride 0.9%, sodium phosphate 20.01 mmol in dextrose 5 % (D5W) 250 mL IVPB, sodium phosphate 30 mmol in dextrose 5 % (D5W) 250 mL IVPB, sodium phosphate 39.99 mmol in dextrose 5 % (D5W) 250 mL IVPB, Pharmacy to dose Vancomycin consult **AND** vancomycin - pharmacy to dose    Review of patient's allergies indicates:  No Known Allergies  Objective:     Vital Signs (Most Recent):  Temp: 98.2 °F (36.8 °C) (12/11/23 1130)  Pulse: 90 (12/11/23 1319)  Resp: 17 (12/11/23 1319)  BP: (!) 86/0 (12/11/23 0300)  SpO2: 98 % (12/11/23 0857) Vital Signs (24h Range):  Temp:  [97.6 °F (36.4 °C)-98.4 °F (36.9 °C)] 98.2 °F (36.8 °C)  Pulse:  [] 90  Resp:  [17-20] 17  SpO2:  [98 %-99 %] 98 %  BP: (78-88)/(0) 86/0  Arterial Line BP: ()/(53-76) 90/67     Patient Vitals for the past 72 hrs (Last 3 readings):   Weight   12/11/23 0730 78 kg (172 lb)   12/10/23 0509 78 kg (172 lb)       Body mass index is 23.33 kg/m².      Intake/Output Summary (Last 24 hours) at 12/11/2023 1503  Last data filed at 12/11/2023 1344  Gross per 24 hour   Intake 7245.58 ml   Output 5450 ml   Net 1795.58 ml         Hemodynamic Parameters:       Telemetry: NSR        Physical Exam  Constitutional:       Appearance: He is ill-appearing.   HENT:      Head: Normocephalic and atraumatic.   Eyes:      Pupils: Pupils are equal, round, and reactive to light.   Cardiovascular:      Rate and Rhythm: Normal rate and regular rhythm.      Comments: LVAD hum  Pulmonary:      Breath sounds: Rhonchi and rales present.   Abdominal:      General: Abdomen is flat. Bowel sounds are normal. There is no distension.      Palpations: Abdomen is soft.      Tenderness: There is abdominal tenderness.    Musculoskeletal:         General: Normal range of motion.      Cervical back: Neck supple.   Skin:     General: Skin is warm and dry.   Neurological:      Comments: Awake and oriented.             Significant Labs:  CBC:  Recent Labs   Lab 12/09/23  0323 12/10/23  0309 12/10/23  0406 12/11/23  0405 12/11/23  0952   WBC 10.13  --  17.57* 27.70*  --    RBC 2.49*  --  2.35* 2.68*  --    HGB 7.1*  --  6.6* 7.7*  --    HCT 21.2*   < > 21.1* 23.2* 25*     --  253 287  --    MCV 85  --  90 87  --    MCH 28.5  --  28.1 28.7  --    MCHC 33.5  --  31.3* 33.2  --     < > = values in this interval not displayed.       BNP:  Recent Labs   Lab 12/06/23  0410 12/08/23  0408 12/11/23  0405   BNP 1,679* 695* 2,154*       CMP:  Recent Labs   Lab 12/09/23  0323 12/09/23  1145 12/10/23  0406 12/10/23  1127 12/10/23  1342 12/10/23  1715 12/10/23  2204 12/11/23  0405 12/11/23  1151   *   < > 188*  --  161*  161*  --  230* 199*  199*  199*  --    CALCIUM 8.1*   < > 8.5*  --  8.6*  8.6*  --  8.2* 8.7  8.7  8.8  --    ALBUMIN 2.0*   < > 2.1*  --  2.2*  2.2*  --  2.0* 2.1*  2.1*  2.1*  2.1*  --    PROT 6.2  --  6.1  --   --   --   --  6.5  6.5  --       < > 139  --  138  138  --  134* 135*  135*  136  --    K 4.2   < > 4.4   < > 4.5  4.5   < > 3.9 4.2  4.2  4.1 4.2   CO2 24   < > 20*  --  22*  22*  --  18* 20*  20*  20*  --       < > 105  --  104  104  --  103 103  103  103  --    BUN 8   < > 6*  --  5*  5*  --  7* 7*  7*  8  --    CREATININE 0.8   < > 0.7  --  0.7  0.7  --  0.7 0.8  0.8  0.8  --    ALKPHOS 147*  --  145*  --   --   --   --  174*  174*  --    ALT 28  --  26  --   --   --   --  27  27  --    AST 44*  --  55*  --   --   --   --  47*  47*  --    BILITOT 0.7  --  1.0  --   --   --   --  1.3*  1.4*  --     < > = values in this interval not displayed.        Coagulation:   Recent Labs   Lab 12/11/23  0014 12/11/23  0405 12/11/23  0502 12/11/23  1151   INR 1.2 1.2  --   1.2   APTT 36.5*  --  39.6* 42.7*       LDH:  Recent Labs   Lab 12/09/23  0323 12/10/23  0406 12/11/23  0405   * 689* 717*       Microbiology:  Microbiology Results (last 7 days)       Procedure Component Value Units Date/Time    Blood culture [[id number]] Collected: 12/10/23 1240    Order Status: Completed Specimen: Blood from Peripheral, Antecubital, Right Updated: 12/11/23 0145     Blood Culture, Routine No Growth to date    Blood culture [[id number]] Collected: 12/10/23 1149    Order Status: Completed Specimen: Blood from Line, Jugular, External Right Updated: 12/11/23 0145     Blood Culture, Routine No Growth to date    Culture, Respiratory [[id number]] Collected: 12/06/23 1531    Order Status: Completed Specimen: Respiratory from Bronchial Wash Updated: 12/08/23 1103     Respiratory Culture No growth     Gram Stain (Respiratory) Moderate WBC's     Gram Stain (Respiratory) Rare Gram negative rods    Narrative:      Bronchial Wash    AFB Culture & Smear [[id number]] Collected: 12/06/23 1532    Order Status: Completed Specimen: Body Fluid from Lung, Right Updated: 12/07/23 2127     AFB Culture & Smear Culture in progress     AFB CULTURE STAIN No acid fast bacilli seen.    Narrative:      Bronchial Wash    Direct AFB stain [[id number]] Collected: 12/06/23 1532    Order Status: Completed Specimen: Body Fluid from Lung, Right Updated: 12/06/23 2257     Direct Acid Fast No acid fast bacilli seen.    Narrative:      Bronchial Wash    Fungus culture [[id number]] Collected: 12/06/23 1532    Order Status: Sent Specimen: Body Fluid from Lung, Right Updated: 12/06/23 1719    Gram stain [[id number]] Collected: 12/06/23 1532    Order Status: Canceled Specimen: Body Fluid from Lung, Right             BMP:   Recent Labs   Lab 12/11/23  0405 12/11/23  1151   *  199*  199*  --    *  135*  136  --    K 4.2  4.2  4.1 4.2     103  103  --    CO2 20*  20*  20*  --    BUN 7*  7*  8  --     CREATININE 0.8  0.8  0.8  --    CALCIUM 8.7  8.7  8.8  --    MG 2.0 1.9       I have reviewed all pertinent labs within the past 24 hours.    Estimated Creatinine Clearance: 106.4 mL/min (based on SCr of 0.8 mg/dL).    Diagnostic Results:  I have reviewed all pertinent imaging results/findings within the past 24 hours.  Assessment and Plan:     63 year old male with hx of CAD s/p 3v CABG (unclear anatomy, 2009), ICM with a recent EF of 15-20% s/p ICD (medtronik 2009), DM2 (a1c 7.7), HTN, HLD, Vfib on amio who presents to the ED with CC of SOB     Pt was recently admitted to [facility] [place] as a transfer.  He was started on a Lasix gtt and did well.  Started on gDMT and discharged home on  5 with plans to follow up in [facility] clinic for ongoing transplant evaluation at another facility.  He says that about a few days ago he started to notice LE swelling.  This turned into Nelson and orthopnea.  He says he can't walk to the bathroom without getting SOB.  He also complains of weight gain.  He takes torsemide 20mg BID at home and was told to trial additional Lasix which he did without any improvement.  He was rx metolazone but has not been filled.  He came to the ED     In the ED he was AF with stable VS on RA.  CBC showing chronic anemia.  CMP notable for acute on chronic CKD with baseline around 2.1 and Cr now 2.6.  BNP elevated.  Lactate neg.  CXR showing pulmonary edema.  I evaluated the pt at the bedside.  Bedside TTE showing CVP >15.  He was subsequently admitted to the CCU for diuresis.     He was continued on his home  and was started on a lasix gtt, which he responded well to overnight (net -1700cc. He feels much better this morning as well. Our transplant coordinators have been working on insurance approval and he is now being transferred to [facility] service for transplant evaluation.     * Acute on chronic combined systolic and diastolic CHF, NYHA class 4  Pt with known ICM with an EF of 25% on home  presented with  decompensated HF.  Not in cardiogenic shock    RHC 11/14: RA 14, PA 70/35, PCWP 32, CO 4.1, CI 2.1.   Repeat RHC 11/20 RA RA  20  PA 60/29 (39)  PCWP 29    CO  4.6 l/min  CI  2.3 l/min/m2  2D echo 11/21 showed EF 15-20%, mild RV dysfunction, mild MR, LVEDD 6.9     Repeat on 11/24 showed EF 10-15%, RV mildly reduced, mild-mod MR, LVEDD 6.42  - Home  gtt at 5  - home GDMT: entresto 24/26mg BID (on hold 2/2 IVÁN), Hydralazine 25 q8h - held prior to LVAD implant   - home diuretics: Was taking lasix 40 bid  - Bridged to LVAD with IABP s/p HM3 implant 12/1        -PT/OT and nutrition   -s/p LVAD (12/1) Speed increased on 12/7 to 4900 RPMs  - Supported on giapreza, Ketty, epi, vaso  - f/u repeat echo  - CTS primary            Altered mental status  -multifactorial  -likely metabolic encephalopathy +/- icu delirium   -CTH 12/7 negative for acute process  -Continue RRT for metabolic clearance  -Wean sedation as able  -EEG done over the weekend no seizures detected, just generalized slowing  -provide frequent re-orientation  -Promote sleep/wake cycle   -Neurology consulted   - Much more awake this AM, continue to encourage and motivate    LVAD (left ventricular assist device) present  -HeartMate 3 Implanted  12/1/2023  as DT  -CTS Primary  -Implanted by Dr. Washington  -HOLD Coumadin,  Goal INR 2.0-3.0 . Subtherapeutic today. On Heparin gtt   -Antiplatelets Not on ASA  -LDH is stable overall today. Will continue to monitor daily.  -Speed set at  4900   rpm, LSL 4500 rpm   -Interrogation notable for no events  -Not listed for OHTx, declined for OHTX due to comorbidities         Procedure: Device Interrogation Including analysis of device parameters  Current Settings: Ventricular Assist Device  Review of device function is stable/unstable stable        12/11/2023     2:00 PM 12/11/2023     1:00 PM 12/11/2023    11:00 AM 12/11/2023    10:00 AM 12/11/2023     9:00 AM 12/11/2023     8:00 AM 12/11/2023     7:00 AM   TXP LVAD  INTERROGATIONS   Type HeartMate3 HeartMate3 HeartMate3 HeartMate3 HeartMate3 HeartMate3 HeartMate3   Flow 4.2 4.1 3.9 4 4 4 4   Speed 4900 4900 4900 4900 4900 4900 4900   PI 3.7 4.4 4.5 4.4 4.4 3.9 5.3   Power (Carrington) 3.2 3.3 3.5 3.3 3.2 4.5 3.3   LSL 4500 4500 4500 4500 4500 4500 4500   Pulsatility Intermittent pulse Intermittent pulse Intermittent pulse Intermittent pulse Intermittent pulse Intermittent pulse Intermittent pulse         PAF (paroxysmal atrial fibrillation)  Known hx of pAF. In sinus rhythm on admission, but 1 run of AF RVR overnight. He spontaneously converted.  - Continue home amiodarone 400mg qd  - A/c per primary team      Acute renal failure with acute tubular necrosis superimposed on stage 3b chronic kidney disease  IVÁN on CKD2. Baseline Cr of 1.7-2.0.   - Hold ARNi, entresto  -Trend BMPs daily   -SrCr 1.1/BUN 13 today  -SLED/SCUF for volume removal   -Nephrology following     Type 2 diabetes mellitus without complication, without long-term current use of insulin  -A1c 7.6, not insulin dependent  - MDSSI  -Currently on Insulin gtt   - Endocrine following, appreciate assistance with blood glucose management    CAD (coronary artery disease)  -S/p 3vCABG in 2009  - home ASA, HI statin- on hold after surgery     Ventricular tachycardia  Hx VT s/p ICD placement (medtronic 2009)  - Continue home amio 400mg qd    Uninterrupted Critical Care/Counseling Time (not including procedures): 60 min.       Jimy An PA-C  Heart Transplant  [facility]

## 2023-12-11 NOTE — NURSING
CRRT restarted to the right IJ trialysis cath per orders placed by Dr. JEREMY Ayon:        Order Questions    Question Answer   Duration of treatment Continuous   Dialyzer: R300   Dialysate Temperature (C) 36   Blood Flow Rate (mL/min) 150 mL/min   K+ 4 MEQ/L   Ca++ 2.0 MEQ/L   Na+ 140 meq/L   Bicarb 28 meq/L   Type of therapy: SLED (Slow Low-efficiency Dialysis)    cc/min   Ultra-filtration rate (cc/hr) 200-500 as tolerated to keep MAP > 65 mmHg              Soft Arterial b/p    Primary nurse made aware of the POC and the expected UFR goal as tolerated

## 2023-12-11 NOTE — SUBJECTIVE & OBJECTIVE
Subjective:     Post-Op Info:  Procedure(s) (LRB):  CLOSURE, WOUND, STERNUM (N/A)  INSERTION, GRAFT, PERICARDIUM  DRAINAGE, PLEURAL EFFUSION   7 Days Post-Op      Reason for Visit:  Patient is seen in follow up management of LVAD     Interval History: Increasing pressor requirements and leukocytosis over the past 24 hours. ID to weigh in on sepsis evaluation, recommend continue empiric broad spectrum antimicrobials. Echo pending.   Implant 12/1/23    Medications:  Continuous Infusions:   sodium chloride 0.9% 5 mL/hr at 12/11/23 1300    angiotensin II (GIAPREZA) 2,500,000 ng in sodium chloride 0.9% 250 mL infusion 10 ng/kg/min (12/11/23 1300)    calcium gluconate 3 g in dextrose 5 % (D5W) 100 mL infusion 30 mL/hr at 12/11/23 1405    dextrose 10 % in water (D10W)      dextrose-sod citrate-citric ac 200 mL/hr at 12/11/23 1353    EPINEPHrine 0.04 mcg/kg/min (12/11/23 1300)    heparin (porcine) in 5 % dex 1,200 Units/hr (12/11/23 1300)    nitric oxide gas      NORepinephrine bitartrate-D5W 0.08 mcg/kg/min (12/11/23 1300)    vasopressin 0.04 Units/min (12/11/23 1300)     Scheduled Meds:   amiodarone  400 mg Per NG tube Daily    atorvastatin  40 mg Per NG tube Daily    hydrocortisone sodium succinate  100 mg Intravenous Q8H    insulin aspart U-100  7 Units Subcutaneous 6 times per day    meropenem (MERREM) IVPB  2 g Intravenous Q8H    micafungin (MYCAMINE) IVPB  100 mg Intravenous Q24H    midodrine  15 mg Oral TID    pantoprazole  40 mg Intravenous BID    polyethylene glycol  17 g Per NG tube Daily    senna-docusate 8.6-50 mg  1 tablet Per NG tube Daily    warfarin  2 mg Per NG tube Daily     PRN Meds:0.9%  NaCl infusion (for blood administration), acetaminophen, albumin human 5%, albuterol sulfate, bisacodyL, dextrose 10 % in water (D10W), dextrose 10%, dextrose 10%, glucagon (human recombinant), insulin aspart U-100, magnesium hydroxide 400 mg/5 ml, magnesium sulfate IVPB, potassium chloride, potassium chloride,  QUEtiapine, Flushing PICC/Midline Protocol **AND** [DISCONTINUED] sodium chloride 0.9% **AND** sodium chloride 0.9%, sodium phosphate 20.01 mmol in dextrose 5 % (D5W) 250 mL IVPB, sodium phosphate 30 mmol in dextrose 5 % (D5W) 250 mL IVPB, sodium phosphate 39.99 mmol in dextrose 5 % (D5W) 250 mL IVPB, Pharmacy to dose Vancomycin consult **AND** vancomycin - pharmacy to dose     Objective:     Vital Signs (Most Recent):  Temp: 98.2 °F (36.8 °C) (12/11/23 1130)  Pulse: 90 (12/11/23 1319)  Resp: 17 (12/11/23 1319)  BP: (!) 86/0 (12/11/23 0300)  SpO2: 98 % (12/11/23 0857) Vital Signs (24h Range):  Temp:  [97.6 °F (36.4 °C)-98.4 °F (36.9 °C)] 98.2 °F (36.8 °C)  Pulse:  [] 90  Resp:  [17-20] 17  SpO2:  [98 %-99 %] 98 %  BP: (78-88)/(0) 86/0  Arterial Line BP: ()/(53-76) 90/67       Intake/Output Summary (Last 24 hours) at 12/11/2023 1449  Last data filed at 12/11/2023 1344  Gross per 24 hour   Intake 7841.62 ml   Output 5662 ml   Net 2179.62 ml       Physical Exam  Constitutional:       General: He is not in acute distress.     Appearance: Normal appearance.   HENT:      Head: Normocephalic and atraumatic.      Mouth/Throat:      Mouth: Mucous membranes are dry.      Pharynx: Oropharynx is clear.   Eyes:      Extraocular Movements: Extraocular movements intact.      Pupils: Pupils are equal, round, and reactive to light.   Cardiovascular:      Rate and Rhythm: Normal rate and regular rhythm.      Pulses: Normal pulses.      Comments: LVAD hum is smooth  Pulmonary:      Effort: Pulmonary effort is normal.      Breath sounds: Normal breath sounds.   Abdominal:      General: Abdomen is flat.      Palpations: Abdomen is soft.   Musculoskeletal:         General: Normal range of motion.      Cervical back: Normal range of motion.   Skin:     General: Skin is warm and dry.   Neurological:      General: No focal deficit present.      Mental Status: He is alert.         Significant Labs:  ABGs:   Recent Labs   Lab  "12/11/23  0952   PH 7.502*   PCO2 33.7*   PO2 188*   HCO3 26.3   POCSATURATED 100   BE 3*     Amylase: No results for input(s): "AMYLASE" in the last 48 hours.  BMP:   Recent Labs   Lab 12/11/23  0405 12/11/23  1151   *  199*  199*  --    *  135*  136  --    K 4.2  4.2  4.1 4.2     103  103  --    CO2 20*  20*  20*  --    BUN 7*  7*  8  --    CREATININE 0.8  0.8  0.8  --    CALCIUM 8.7  8.7  8.8  --    MG 2.0 1.9     Cardiac markers: No results for input(s): "CKMB", "CPKMB", "TROPONINT", "TROPONINI", "MYOGLOBIN" in the last 48 hours.  CBC:   Recent Labs   Lab 12/11/23 0405 12/11/23 0952   WBC 27.70*  --    RBC 2.68*  --    HGB 7.7*  --    HCT 23.2* 25*     --    MCV 87  --    MCH 28.7  --    MCHC 33.2  --      CMP:   Recent Labs   Lab 12/11/23 0405 12/11/23  1151   *  199*  199*  --    CALCIUM 8.7  8.7  8.8  --    ALBUMIN 2.1*  2.1*  2.1*  2.1*  --    PROT 6.5  6.5  --    *  135*  136  --    K 4.2  4.2  4.1 4.2   CO2 20*  20*  20*  --      103  103  --    BUN 7*  7*  8  --    CREATININE 0.8  0.8  0.8  --    ALKPHOS 174*  174*  --    ALT 27  27  --    AST 47*  47*  --    BILITOT 1.3*  1.4*  --      Coagulation:   Recent Labs   Lab 12/11/23  1151   INR 1.2   APTT 42.7*     Lactic Acid: No results for input(s): "LACTATE" in the last 48 hours.  LFTs:   Recent Labs   Lab 12/11/23  0405   ALT 27  27   AST 47*  47*   ALKPHOS 174*  174*   BILITOT 1.3*  1.4*   PROT 6.5  6.5   ALBUMIN 2.1*  2.1*  2.1*  2.1*     Lipase: No results for input(s): "LIPASE" in the last 48 hours.    Significant Diagnostics:  I have reviewed and interpreted all pertinent imaging results/findings within the past 24 hours.    Procedure: Device Interrogation Including analysis of device parameters  Current Settings: Ventricular Assist Device  Review of device function is stable      12/11/2023     2:00 PM 12/11/2023     1:00 PM 12/11/2023    11:00 AM " 12/11/2023    10:00 AM 12/11/2023     9:00 AM 12/11/2023     8:00 AM 12/11/2023     7:00 AM   TXP LVAD INTERROGATIONS   Type HeartMate3 HeartMate3 HeartMate3 HeartMate3 HeartMate3 HeartMate3 HeartMate3   Flow 4.2 4.1 3.9 4 4 4 4   Speed 4900 4900 4900 4900 4900 4900 4900   PI 3.7 4.4 4.5 4.4 4.4 3.9 5.3   Power ([place]) 3.2 3.3 3.5 3.3 3.2 4.5 3.3   LSL 4500 4500 4500 4500 4500 4500 4500   Pulsatility Intermittent pulse Intermittent pulse Intermittent pulse Intermittent pulse Intermittent pulse Intermittent pulse Intermittent pulse

## 2023-12-11 NOTE — SUBJECTIVE & OBJECTIVE
Interval History: Patient seen and examined this AM. Clotting with citrate and SLED. Overnight with worsening vasopressor requirements and leukocytosis. Broad spectrum antibiotics and anti-fungals started with plan for CT chest, abdomen and pelvis today for potential source of infection. Afebrile with pulse ranging from 100-90s bpm. Systolic blood pressures ranging from 90-70s mmHg via arterial line (was on epinephrine, vasopressin and Levophed overnight however no Giapreza, epinephrine and vasopressin). He is saturating +99% on 5 liters of nasal cannula with no documented UOP in the last 24 hours. Net positive ~830 mL.    Review of patient's allergies indicates:  No Known Allergies  Current Facility-Administered Medications   Medication Frequency    0.9%  NaCl infusion (CRRT USE ONLY) Continuous    0.9%  NaCl infusion (for blood administration) Q24H PRN    0.9%  NaCl infusion (for blood administration) Q24H PRN    0.9%  NaCl infusion Continuous    acetaminophen tablet 650 mg Q6H PRN    albumin human 5% bottle 25 g PRN    albuterol sulfate nebulizer solution 2.5 mg Q4H PRN    amiodarone tablet 400 mg Daily    angiotensin II (GIAPREZA) 2,500,000 ng in sodium chloride 0.9% 250 mL infusion Continuous    atorvastatin tablet 40 mg Daily    bisacodyL suppository 10 mg Daily PRN    calcium gluconate 3 g in dextrose 5 % (D5W) 100 mL infusion Continuous    dextrose 10 % infusion Continuous PRN    dextrose 10% bolus 125 mL 125 mL PRN    dextrose 10% bolus 250 mL 250 mL PRN    dextrose-sod citrate-citric ac 2.45-2.2 gram- 800 mg/100 mL Soln Continuous    EPINEPHrine 5 mg in dextrose 5% 250 mL infusion (premix) Continuous    glucagon (human recombinant) injection 1 mg PRN    heparin 25,000 units in dextrose 5% 250 mL (100 units/mL) infusion (heparin infusion - NO NOMOGRAM) Continuous    insulin aspart U-100 pen 0-10 Units Q4H PRN    insulin aspart U-100 pen 7 Units 6 times per day    magnesium hydroxide 400 mg/5 ml suspension  2,400 mg Daily PRN    magnesium sulfate 2g in water 50mL IVPB (premix) PRN    meropenem (MERREM) 2 g in sodium chloride 0.9% 100 mL IVPB Q12H    micafungin 100 mg in sodium chloride 0.9 % 100 mL IVPB (MB+) Q24H    midodrine tablet 15 mg TID    niCARdipine 40 mg/200 mL (0.2 mg/mL) infusion Continuous    nitric oxide gas Gas 5 ppm Continuous    NORepinephrine 4 mg in dextrose 5% 250 mL infusion (premix) Continuous    pantoprazole injection 40 mg BID    polyethylene glycol packet 17 g BID    potassium chloride 20 mEq in 100 mL IVPB (FOR CENTRAL LINE ADMINISTRATION ONLY) PRN    potassium chloride 20 mEq in 100 mL IVPB (FOR CENTRAL LINE ADMINISTRATION ONLY) PRN    QUEtiapine tablet 50 mg Q6H PRN    senna-docusate 8.6-50 mg per tablet 2 tablet BID    sodium chloride 0.9% flush 10 mL PRN    sodium phosphate 20.01 mmol in dextrose 5 % (D5W) 250 mL IVPB PRN    sodium phosphate 30 mmol in dextrose 5 % (D5W) 250 mL IVPB PRN    sodium phosphate 39.99 mmol in dextrose 5 % (D5W) 250 mL IVPB PRN    vancomycin - pharmacy to dose pharmacy to manage frequency    vasopressin (PITRESSIN) 0.2 Units/mL in dextrose 5 % (D5W) 100 mL infusion Continuous    warfarin (COUMADIN) tablet 2 mg Daily       Objective:     Vital Signs (Most Recent):  Temp: 98.4 °F (36.9 °C) (12/11/23 0300)  Pulse: 91 (12/11/23 0630)  Resp: 18 (12/08/23 2305)  BP: (!) 86/0 (12/11/23 0300)  SpO2: 99 % (12/10/23 1915) Vital Signs (24h Range):  Temp:  [97.6 °F (36.4 °C)-98.4 °F (36.9 °C)] 98.4 °F (36.9 °C)  Pulse:  [] 91  SpO2:  [99 %] 99 %  BP: (78-88)/(0) 86/0  Arterial Line BP: ()/(55-76) 90/71     Weight: 78 kg (172 lb) (12/10/23 0509)  Body mass index is 23.33 kg/m².  Body surface area is 1.99 meters squared.    I/O last 3 completed shifts:  In: 77547 [I.V.:2828.3; Blood:350; NG/GT:910; IV Piggyback:7280.7]  Out: 59862 [Other:74568]     Physical Exam  Vitals and nursing note reviewed.   Constitutional:       General: He is awake. He is not in acute  distress.     Appearance: He is ill-appearing. He is not diaphoretic.      Interventions: Nasal cannula in place.   HENT:      Head: Normocephalic and atraumatic.      Right Ear: External ear normal.      Left Ear: External ear normal.      Nose:      Comments: NG tube to left nostril and nasal cannula in place.     Mouth/Throat:      Mouth: Mucous membranes are moist.      Pharynx: Oropharynx is clear. No oropharyngeal exudate or posterior oropharyngeal erythema.   Eyes:      General: No scleral icterus.        Right eye: No discharge.         Left eye: No discharge.      Extraocular Movements: Extraocular movements intact.      Conjunctiva/sclera: Conjunctivae normal.   Neck:      Comments: Trialysis catheter to left internal jugular vein.   Cardiovascular:      Rate and Rhythm: Normal rate.      Heart sounds: Murmur (humming sound, VAD) heard.      Systolic murmur is present.      No friction rub. No gallop.   Pulmonary:      Breath sounds: Rales present. No wheezing.      Comments: Bibasilar crackles appreciated.   Chest:      Comments: LVAD present.   Abdominal:      General: Bowel sounds are normal. There is no distension.      Palpations: Abdomen is soft.   Musculoskeletal:      Cervical back: Neck supple.      Right lower leg: No edema.      Left lower leg: No edema.   Skin:     General: Skin is warm and dry.      Coloration: Skin is not jaundiced.   Neurological:      Mental Status: He is alert. Mental status is at baseline. He is confused.      Cranial Nerves: No cranial nerve deficit.   Psychiatric:         Mood and Affect: Mood normal.         Behavior: Behavior is cooperative.          Significant Labs:  ABGs:   Recent Labs   Lab 12/11/23  0502   PH 7.448   PCO2 35.9   HCO3 24.8   POCSATURATED 100   BE 1       BMP:   Recent Labs   Lab 12/11/23  0405   *  199*  199*   *  135*  136   K 4.2  4.2  4.1     103  103   CO2 20*  20*  20*   BUN 7*  7*  8   CREATININE 0.8  0.8   0.8   CALCIUM 8.7  8.7  8.8   MG 2.0       CBC:   Recent Labs   Lab 12/11/23  0405   WBC 27.70*   RBC 2.68*   HGB 7.7*   HCT 23.2*      MCV 87   MCH 28.7   MCHC 33.2       CMP:   Recent Labs   Lab 12/11/23  0405   *  199*  199*   CALCIUM 8.7  8.7  8.8   ALBUMIN 2.1*  2.1*  2.1*  2.1*   PROT 6.5  6.5   *  135*  136   K 4.2  4.2  4.1   CO2 20*  20*  20*     103  103   BUN 7*  7*  8   CREATININE 0.8  0.8  0.8   ALKPHOS 174*  174*   ALT 27  27   AST 47*  47*   BILITOT 1.3*  1.4*       Coagulation:   Recent Labs   Lab 12/11/23  0405 12/11/23  0502   INR 1.2  --    APTT  --  39.6*       LFTs:   Recent Labs   Lab 12/11/23  0405   ALT 27  27   AST 47*  47*   ALKPHOS 174*  174*   BILITOT 1.3*  1.4*   PROT 6.5  6.5   ALBUMIN 2.1*  2.1*  2.1*  2.1*       Microbiology Results (last 7 days)       Procedure Component Value Units Date/Time    Blood culture [[id number]] Collected: 12/10/23 1240    Order Status: Completed Specimen: Blood from Peripheral, Antecubital, Right Updated: 12/11/23 0145     Blood Culture, Routine No Growth to date    Blood culture [[id number]] Collected: 12/10/23 1149    Order Status: Completed Specimen: Blood from Line, Jugular, External Right Updated: 12/11/23 0145     Blood Culture, Routine No Growth to date    Culture, Respiratory [[id number]] Collected: 12/06/23 1531    Order Status: Completed Specimen: Respiratory from Bronchial Wash Updated: 12/08/23 1103     Respiratory Culture No growth     Gram Stain (Respiratory) Moderate WBC's     Gram Stain (Respiratory) Rare Gram negative rods    Narrative:      Bronchial Wash    AFB Culture & Smear [[id number]] Collected: 12/06/23 1532    Order Status: Completed Specimen: Body Fluid from Lung, Right Updated: 12/07/23 2127     AFB Culture & Smear Culture in progress     AFB CULTURE STAIN No acid fast bacilli seen.    Narrative:      Bronchial Wash    Direct AFB stain [[id number]] Collected: 12/06/23  1532    Order Status: Completed Specimen: Body Fluid from Lung, Right Updated: 12/06/23 2257     Direct Acid Fast No acid fast bacilli seen.    Narrative:      Bronchial Wash    Fungus culture [[id number]] Collected: 12/06/23 1532    Order Status: Sent Specimen: Body Fluid from Lung, Right Updated: 12/06/23 1719    Gram stain [[id number]] Collected: 12/06/23 1532    Order Status: Canceled Specimen: Body Fluid from Lung, Right           Specimen (24h ago, onward)      None          Significant Imaging:  I have reviewed all imagining in the last 24 hours.

## 2023-12-11 NOTE — PLAN OF CARE
Recommendations     1. If renal formula warranted for IVÁN: Recommend Novasource renal 40 ml/hr to provide 1920 kcal, 87 g pro, 688 mL water, FWF per MD (96% EEN and 83% of EPN).                 -As pressor requirements increase, rec'd trickle feeds for best tolerance. Avoid holding TF for residuals less than 500mL unless associated with other s/s of intolerance such as N/V/D, abd pain/distention.                 - consider adding BenePRO BID if CRRT continues (PRO needs will be higher), provides 12g PRO.       2. Alternate TF recs: If new TF formula is medically warranted, recommend Impact Peptide 1.5 at trickle rate and slowly increase to goal rate of 55ml/hr to provide 1980 kcal, 124g PRO, and 1016ml FF, FWF per MD (100% of EEN/EPN).                 - renal friendly formula                 - Higher PRO formula     3. If diarrhea or loose stools occur, consider adding psyllium husk BID to help bulk up stools.      4. RD to monitor wt, tolerance, skin, labs.         Goals: Meet % of EEN/EPN by RD f/u date  Nutrition Goal Status: goal met  Communication of RD Recs:  (POC)

## 2023-12-11 NOTE — ASSESSMENT & PLAN NOTE
-HeartMate 3 Implanted  12/1/2023  as DT  -CTS Primary  -Implanted by Dr. Washington  -HOLD Coumadin,  Goal INR 2.0-3.0 . Subtherapeutic today. On Heparin gtt   -Antiplatelets Not on ASA  -LDH is stable overall today. Will continue to monitor daily.  -Speed set at  4900   rpm, LSL 4500 rpm   -Interrogation notable for no events  -Not listed for OHTx, declined for OHTX due to comorbidities         Procedure: Device Interrogation Including analysis of device parameters  Current Settings: Ventricular Assist Device  Review of device function is stable/unstable stable        12/11/2023     2:00 PM 12/11/2023     1:00 PM 12/11/2023    11:00 AM 12/11/2023    10:00 AM 12/11/2023     9:00 AM 12/11/2023     8:00 AM 12/11/2023     7:00 AM   TXP LVAD INTERROGATIONS   Type HeartMate3 HeartMate3 HeartMate3 HeartMate3 HeartMate3 HeartMate3 HeartMate3   Flow 4.2 4.1 3.9 4 4 4 4   Speed 4900 4900 4900 4900 4900 4900 4900   PI 3.7 4.4 4.5 4.4 4.4 3.9 5.3   Power ([place]) 3.2 3.3 3.5 3.3 3.2 4.5 3.3   LSL 4500 4500 4500 4500 4500 4500 4500   Pulsatility Intermittent pulse Intermittent pulse Intermittent pulse Intermittent pulse Intermittent pulse Intermittent pulse Intermittent pulse

## 2023-12-11 NOTE — CARE UPDATE
-Glucose Goal 140-180    -A1C:   Hemoglobin A1C   Date Value Ref Range Status   10/12/2023 7.6 (H) 4.0 - 5.6 % Final     Comment:     ADA Screening Guidelines:  5.7-6.4%  Consistent with prediabetes  >or=6.5%  Consistent with diabetes    High levels of fetal hemoglobin interfere with the HbA1C  assay. Heterozygous hemoglobin variants (HbS, HgC, etc)do  not significantly interfere with this assay.   However, presence of multiple variants may affect accuracy.           -HOME REGIMEN: Metformin (off since October)      -GLUCOSE TREND FOR THE PAST 24HRS:   Recent Labs   Lab 12/10/23  1131 12/10/23  1558 12/10/23  2014 12/11/23  0011 12/11/23  0404 12/11/23  0741   POCTGLUCOSE 188* 187* 189* 206* 194* 168*         -NO HYPOGYCEMIAS NOTED     - Diet  Diet NPO Except for: Medication (per NG tube)    -Steroids - none     -Tube Feeds - TF @ 40 mL         Plan:   - 7 units Novolog q 4 hr to cover TF   - MDC (150/25) prn q 4 hr (adjusted sliding scale to start sliding at 150 instead of 180).   - POCT Glucose q 4 hr   - Hypoglycemia protocol in place      ** Please notify Endocrine for any change and/or advance in diet**  ** Please call Endocrine for any BG related issues **     Discharge Planning:   TBD. Please notify endocrinology prior to discharge.

## 2023-12-11 NOTE — PT/OT/SLP EVAL
Speech Language Pathology Evaluation  Bedside Swallow    Patient Name:  Radha Abbott   MRN:  [id number]  Admitting Diagnosis: Acute on chronic combined systolic and diastolic CHF, NYHA class 4    Recommendations:                 General Recommendations:  Dysphagia therapy  Diet recommendations:  NPO, NPO   Aspiration Precautions: Standard aspiration precautions   General Precautions: Standard,    Communication strategies:  none    Assessment:     Radha Abbott is a 63 y.o. male with an SLP diagnosis of Dysphagia and Dysphonia.      History:     Past Medical History:   Diagnosis Date    CAD (coronary artery disease)     Diabetes mellitus     HFrEF (heart failure with reduced ejection fraction)     ICD (implantable cardioverter-defibrillator) in place     MI, old        Past Surgical History:   Procedure Laterality Date    ANGIOPLASTY-VENOUS ARTERY Right 12/1/2023    Procedure: ANGIOPLASTY-VENOUS ARTERY, RIGHT FEMORAL;  Surgeon: Yuri Washington MD;  Location: [facility] OR [address] [place] [address];  Service: Cardiovascular;  Laterality: Right;    AORTIC VALVULOPLASTY N/A 12/1/2023    Procedure: REPAIR, AORTIC VALVE;  Surgeon: Yuri Washington MD;  Location: [facility] OR [address] [place] [address];  Service: Cardiovascular;  Laterality: N/A;    CARDIAC SURGERY      CLOSURE N/A 12/1/2023    Procedure: CLOSURE, TEMPORARY;  Surgeon: Yuri Washington MD;  [facility];  Service: Cardiovascular;  Laterality: N/A;    DRAINAGE OF PLEURAL EFFUSION  12/4/2023    Procedure: DRAINAGE, PLEURAL EFFUSION;  Surgeon: Yuri Washington MD;  [facility];  Service: Cardiovascular;;    INSERTION OF GRAFT TO PERICARDIUM  12/4/2023    Procedure: INSERTION, GRAFT, PERICARDIUM;  Surgeon: Yuri Washington MD;  [facility];  Service: Cardiovascular;;    INSERTION OF INTRA-AORTIC BALLOON ASSIST DEVICE Right 11/21/2023    Procedure: INSERTION, INTRA-AORTIC BALLOON PUMP;  Surgeon: Finn Cohn MD;  Location: [facility] CATH LAB;  Service: Cardiology;   Laterality: Right;    LEFT VENTRICULAR ASSIST DEVICE Left 12/1/2023    Procedure: INSERTION-LEFT VENTRICULAR ASSIST DEVICE;  Surgeon: Yuri Washington MD;  [facility];  Service: Cardiovascular;  Laterality: Left;  REDO STERNOTOMY - REDO SAW NEEDED FOR CASE    LYSIS OF ADHESIONS  12/1/2023    Procedure: LYSIS, ADHESIONS;  Surgeon: Yuri Washington MD;  [facility];  Service: Cardiovascular;;    PLACEMENT OF SWAN ROLANDO CATHETER WITH IMAGING GUIDANCE  11/20/2023    Procedure: INSERTION, CATHETER, SWAN-ROLANDO, WITH IMAGING GUIDANCE;  Surgeon: Sajan Hurley MD;  Location: [facility] CATH LAB;  Service: Cardiology;;    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 12/1/2023    Procedure: REPAIR, ANEURYSM, ARTERY, FEMORAL;  Surgeon: Yuri Washington MD;  [facility];  Service: Cardiovascular;  Laterality: Right;  Right Femoral Artery Repair    RIGHT HEART CATHETERIZATION Right 10/10/2023    Procedure: INSERTION, CATHETER, RIGHT HEART;  Surgeon: Bin Gandhi MD;  Location: [facility] CATH LAB;  Service: Cardiology;  Laterality: Right;    RIGHT HEART CATHETERIZATION Right 10/13/2023    Procedure: INSERTION, CATHETER, RIGHT HEART;  Surgeon: Walter Mcintyre MD;  Location: [facility] CATH LAB;  Service: Cardiology;  Laterality: Right;    RIGHT HEART CATHETERIZATION  11/13/2023    RIGHT HEART CATHETERIZATION Right 11/13/2023    Procedure: INSERTION, CATHETER, RIGHT HEART;  Surgeon: Juventino Bermudez Jr., MD;  Location: [facility] CATH LAB;  Service: Cardiology;  Laterality: Right;    RIGHT HEART CATHETERIZATION Right 11/20/2023    Procedure: INSERTION, CATHETER, RIGHT HEART;  Surgeon: Sajan Hurley MD;  Location: [facility] CATH LAB;  Service: Cardiology;  Laterality: Right;    STERNAL WOUND CLOSURE N/A 12/4/2023    Procedure: CLOSURE, WOUND, STERNUM;  Surgeon: Yuri Washington MD;  [facility];  Service: Cardiovascular;  Laterality: N/A;    STERNOTOMY N/A 12/1/2023    Procedure:  STERNOTOMY, REDO;  Surgeon: Yuri Washington MD;  [facility];  Service: Cardiovascular;  Laterality: N/A;    VALVULOPLASTY, MITRAL VALVE N/A 12/1/2023    Procedure: VALVULOPLASTY, MITRAL VALVE;  Surgeon: Yuri Washington MD;  [facility];  Service: Cardiovascular;  Laterality: N/A;     HPI: Radha Abbott is a 63 y.o. man with PMHx significant for CAD s/p 3V CABG in 2009 now with EF 10-15%, grade 3 diastolic dysfunction with ICD in place, normal RV function, moderate MR, PASP 41 on home , admitted for exacerbation of heart failure. Other co morbidities include uncontrolled DM2, HTN, HLD, hx of vfib taking home amio, CKD (baseline Cr ~2.5).     The patient presents to the SICU s/p LVAD re-do sternotomy with Dr. Washington on 12/1/2023. On admission, he is intubated, sedated with propofol, with an open chest, and in stable condition     Prior Intubation HX:  12/1-12/1 and 12/1-12/5, 12/6-12/9    Modified Barium Swallow:  none prior     Chest X-Rays:   EXAMINATION:  XR CHEST 1 VIEW     CLINICAL HISTORY:  lvad;     TECHNIQUE:  Single frontal view of the chest was performed.     COMPARISON:  12/10/2023     FINDINGS:  Cardiac size is stable.  AICD unit and LVAD device are in place.  Enteric tube and vascular catheters are present.  Lungs are satisfactorily aerated with minimally increased markings at the lung bases.     Impression:     See above     Prior diet: currently NPO with NG tube     Subjective     Pt awake and alert     Pain/Comfort:  Pain Rating 1: 0/10  Pain Rating Post-Intervention 1: 0/10    Respiratory Status: Nasal cannula, flow 5 L/min    Objective:     Oral Musculature Evaluation  Oral Musculature: WFL, general weakness  Dentition: present and adequate  Secretion Management: oral holding  Volitional Cough: weak  Volitional Swallow: delayed initiation  Voice Prior to PO Intake: weak; hoearse breathy dysphonic    Bedside Swallow Eval:   Consistencies Assessed:  Thin liquids ice chip  x1    Additional PO trials deferred given concerning clinical status and poor vocal quality/concern for inadequate airway protection     Oral Phase:   WFL  Oral pooling and orally suctioning all his oral secretions     Pharyngeal Phase:   coughing/choking  decreased hyolaryngeal excursion to palpation  delayed swallow initation    Compensatory Strategies  None    Treatment: Pt seen for initial swallow assessment.  SLP discussed with pt and spouse at length pt not appearing ready for any diet advancement at this time.   Pt presenting as high aspiration risk. Ice chips sparingly for ongoing comfort. Continue with NG tube as primary means of nutrition/hydration/medication.       Goals:   Multidisciplinary Problems       SLP Goals          Problem: SLP    Goal Priority Disciplines Outcome   SLP Goal     SLP    Description: Speech Language Pathology Goals  Goals expected to be met by 12/24    1. Pt will participate in ongoing assessment of swallow function to help determine least restrictive diet                              Plan of Care reviewed with:  patient   SLP Follow-Up:  Yes      Barriers to Discharge:  None    Time Tracking:     SLP Treatment Date:   12/11/23  Speech Start Time:  0955  Speech Stop Time:  1010     Speech Total Time (min):  15 min    Billable Minutes: Eval Swallow and Oral Function 7 and Self Care/Home Management Training 8    12/11/2023

## 2023-12-11 NOTE — PROGRESS NOTES
[facility]  Critical Care - Surgery  Progress Note    Patient Name: Radha Abbott  MRN: [id number]  Admission Date: 11/9/2023  Hospital Length of Stay: 32 days  Code Status: Full Code  Attending Provider: Yuri Washington MD  Primary Care Provider: Vsau Kong MD   Principal Problem: Acute on chronic combined systolic and diastolic CHF, NYHA class 4    Subjective:     Hospital/ICU Course:  No notes on file    Interval History/Significant Events: Pressor requirements overnight Epi 0.04 and Heavenly 15. RIJ trialysis placed.     Follow-up For: Procedure(s) (LRB):  CLOSURE, WOUND, STERNUM (N/A)  INSERTION, GRAFT, PERICARDIUM  DRAINAGE, PLEURAL EFFUSION  w  Post-Operative Day: 7 Days Post-Op    Objective:     Vital Signs (Most Recent):  Temp: 97.7 °F (36.5 °C) (12/11/23 0730)  Pulse: 91 (12/11/23 0732)  Resp: 20 (12/11/23 0732)  BP: (!) 86/0 (12/11/23 0300)  SpO2: 98 % (12/11/23 0857) Vital Signs (24h Range):  Temp:  [97.6 °F (36.4 °C)-98.4 °F (36.9 °C)] 97.7 °F (36.5 °C)  Pulse:  [] 91  Resp:  [20] 20  SpO2:  [98 %-99 %] 98 %  BP: (78-88)/(0) 86/0  Arterial Line BP: ()/(55-76) 81/60     Weight: 78 kg (172 lb)  Body mass index is 23.33 kg/m².      Intake/Output Summary (Last 24 hours) at 12/11/2023 1022  Last data filed at 12/11/2023 0800  Gross per 24 hour   Intake 7783.9 ml   Output 6678 ml   Net 1105.9 ml          Physical Exam  Constitutional:       General: He is not in acute distress.  HENT:      Head: Normocephalic and atraumatic.      Mouth/Throat:      Mouth: Mucous membranes are dry.      Pharynx: Oropharynx is clear.   Eyes:      Extraocular Movements: Extraocular movements intact.      Pupils: Pupils are equal, round, and reactive to light.   Cardiovascular:      Rate and Rhythm: Normal rate.      Pulses: Normal pulses.   Pulmonary:      Effort: Pulmonary effort is normal.   Abdominal:      Palpations: Abdomen is soft.   Musculoskeletal:         General: Normal range of  motion.      Cervical back: Normal range of motion.   Skin:     General: Skin is warm and dry.   Neurological:      General: No focal deficit present.      Mental Status: He is alert.            Vents:  Vent Mode: Spont (12/09/23 0843)  Ventilator Initiated: Yes (12/06/23 1233)  Set Rate: 18 BPM (12/09/23 0255)  Vt Set: 450 mL (12/09/23 0255)  Pressure Support: 10 cmH20 (12/09/23 0843)  PEEP/CPAP: 5 cmH20 (12/09/23 0843)  Oxygen Concentration (%): 40 (12/09/23 1000)  Peak Airway Pressure: 16 cmH20 (12/09/23 0843)  Plateau Pressure: 18 cmH20 (12/09/23 0843)  Total Ve: 8.94 L/m (12/09/23 0843)  Negative Inspiratory Force (cm H2O): -25 (12/09/23 1010)  F/VT Ratio<105 (RSBI): (!) 57.47 (12/08/23 1535)    Lines/Drains/Airways       Peripherally Inserted Central Catheter Line  Duration             PICC Triple Lumen 12/05/23 1208 right basilic 5 days              Central Venous Catheter Line  Duration             Trialysis (Dialysis) Catheter 12/10/23 1215 right internal jugular <1 day              Drain  Duration                  NG/OG Tube 12/04/23 1730 Left nostril 6 days              Arterial Line  Duration             Arterial Line 12/06/23 1526 Left Radial 4 days              Line  Duration                  VAD 12/01/23 1015 Left ventricular assist device HeartMate 3 10 days              Peripheral Intravenous Line  Duration                  Peripheral IV - Single Lumen 12/08/23 0620 20 G Left Forearm 3 days                    Significant Labs:    CBC/Anemia Profile:  Recent Labs   Lab 12/10/23  0406 12/11/23  0405 12/11/23  0952   WBC 17.57* 27.70*  --    HGB 6.6* 7.7*  --    HCT 21.1* 23.2* 25*    287  --    MCV 90 87  --    RDW 18.8* 17.2*  --         Chemistries:  Recent Labs   Lab 12/10/23  0406 12/10/23  1127 12/10/23  1342 12/10/23  1715 12/10/23  2204 12/11/23  0405     --  138  138  --  134* 135*  135*  136   K 4.4   < > 4.5  4.5 4.4 3.9 4.2  4.2  4.1     --  104  104  --  103 103   103  103   CO2 20*  --  22*  22*  --  18* 20*  20*  20*   BUN 6*  --  5*  5*  --  7* 7*  7*  8   CREATININE 0.7  --  0.7  0.7  --  0.7 0.8  0.8  0.8   CALCIUM 8.5*  --  8.6*  8.6*  --  8.2* 8.7  8.7  8.8   ALBUMIN 2.1*  --  2.2*  2.2*  --  2.0* 2.1*  2.1*  2.1*  2.1*   PROT 6.1  --   --   --   --  6.5  6.5   BILITOT 1.0  --   --   --   --  1.3*  1.4*   ALKPHOS 145*  --   --   --   --  174*  174*   ALT 26  --   --   --   --  27  27   AST 55*  --   --   --   --  47*  47*   MG 1.9   < > 2.0  2.0 1.9 1.8 2.0   PHOS 3.4  --  1.2*  1.2*  --  2.1* 2.5*  2.5*  2.5*    < > = values in this interval not displayed.       Significant Imaging:  I have reviewed all pertinent imaging results/findings within the past 24 hours.  Assessment/Plan:     Cardiac/Vascular  * Acute on chronic combined systolic and diastolic CHF, NYHA class 4    Neuro/Psych:   -- Sedation: none; seroquel 50 Q6 PRN for agitation  -- Pain: d/c all narcotics  -- gabapentin 300mg QHS  -- psych consulted              Cardiac:   -- S/P redo sternotomy LVAD with Dr. Washington on 12/01, chest closed 12/4  -- MAP Goal: 70-80  -- Cardene PRN  -- Pressors: epi @ 0.04mcg/kg/min, Heavenly 15-20. Titrating vaso on.  -- midodrine 15 TID  -- Anti-HTNs: none  -- Rhythm: NSR  -- amiodarone 400mg daily  -- LVAD heartmate III: RPM 4900, flow 3.6  -- Statin: Atorvastatin 40 mg QD  -- No ASA at this time  -- heparin 1100/hr, PTT goal 45-54  -- coumadin 2mg      Pulmonary:   -- Goal SpO2 >92%  -- supplemental O2 as needed      Renal:  -- Trend BUN/Cr   -- Maintain [place], record strict Is/Os  -- on SLED     Recent Labs   Lab 12/10/23  1342 12/10/23  2204 12/11/23  0405   BUN 5*  5* 7* 7*  7*  8   CREATININE 0.7  0.7 0.7 0.8  0.8  0.8        FEN / GI:   -- Daily CMP, PRN K/Mag/Phos per protocol   -- Replace electrolytes as needed  -- Nutrition: TF at goal 40/hr, having BM  -- Bowel Regimen: Miralax, docusate  -- SLP consult       ID:   -- Afebrile  -- WBC  increasing, ID consulted  -- Abx: Vanc, alphonse, micafungin  -- Non con CTAP for possible source    Recent Labs   Lab 12/09/23  0323 12/10/23  0406 12/11/23  0405   WBC 10.13 17.57* 27.70*           Heme/Onc:   -- Hgb 8.7 pre-operatively  -- received 7 RBC, 2 FFP, 1 platelet, 10 cry intra-op  -- q4 fibrinogen - keep fibrinogen >300  -- holding ASA, on coumadin       Recent Labs   Lab 12/09/23  0323 12/09/23  0813 12/10/23  0406 12/10/23  1127 12/10/23  1715 12/11/23  0014 12/11/23  0405 12/11/23  0502   HGB 7.1*  --  6.6*  --   --   --  7.7*  --      --  253  --   --   --  287  --    APTT  --    < > 30.2   < > 27.1 36.5*  --  39.6*   INR 1.1   < > 1.1   < > 1.2 1.2 1.2  --     < > = values in this interval not displayed.           Endocrine:   -- CTS Goal -140  -- HgbA1c: 7.6  -- Endocrinology consulted for insulin management      PPx:   Feeding:TF at goal  Analgesia/Sedation: seroquel 50 Q6  Thromboembolic Prevention: SCDs  HOB >30: Yes  Stress Ulcer: pantoprazole   Glucose Control: Yes, insulin management per Endocrinology  Bowel reg: Miralax, docusate  Invasive Lines/Drains/Airway:   OGT  Left radial arterial line    PICC line   LVAD     Dispo/Code Status/Palliative:     - Continue SICU Care    - Full Code         Josr Morales MD  Critical Care - Surgery  [facility] - Surgical Intensive Care

## 2023-12-11 NOTE — ASSESSMENT & PLAN NOTE
64 yo male with LVAD for DT on 12/1, post op complicated by diffuse coagulopathy and RV dysfunction with subsequent chest closure on 12/4. Hospital course complicated by respiratory failure s/p intubation, with bronch on 12/6 notable for RLL mucus plug - cultures no growth to date. Also noted to have increasing pressor requirements and leukocytosis over the past 24 hours. CT c/a/p pending. ECHO repeated- in process.    Recommendations:  -continue empiric broad spectrum antimicrobials - vancomycin pharm to dose, meropenem and micafungin pending further work up/cx  -f/u cx, so far no growth to date  -agree with CT to evaluate for potential source

## 2023-12-11 NOTE — PROGRESS NOTES
[facility]  Nephrology  Progress Note    Patient Name: Radha Abbott  MRN: [id number]  Admission Date: 11/9/2023  Hospital Length of Stay: 32 days  Attending Provider: Yuri Washington MD   Primary Care Physician: Vasu Kong MD  Principal Problem:Acute on chronic combined systolic and diastolic CHF, NYHA class 4    Subjective:     HPI: Mr. Abbott is a 63-year-old man with ischemic cardiomyopathy with most recent TTE showing EF 10 -15% and G3DD s/p [employer] ICM placement on chronic dobutamine infusion, CAD s/p x3 vessel CABG back in 2009, type 2 diabetes mellitus (most recent hemoglobin A1c 7.6%), hypertension, HLD, history of ventricular fibrillation for which he is on amiodarone and CKD IIIb (baseline creatinine ~2-2.2) who was initially admitted on 11/9 with heart failure exacerbation and hypervolemia for which he was managed with inotrope with dobutamine in addition to IV diuresis. He ultimately underwent IABP placed on 11/21 for mechanical hemodynamic support and subsequently underwent LVAD placement on 12/1. His renal function appears to be mostly stable with IVÁN and creatinine in mid 2s to 3s in addition he is still making urine with Lasix infusion however on 12/5 Nephrology consulted given concern for uremia with BUN 72.     Interval History: Patient seen and examined this AM. Clotting with citrate and SLED. Overnight with worsening vasopressor requirements and leukocytosis. Broad spectrum antibiotics and anti-fungals started with plan for CT chest, abdomen and pelvis today for potential source of infection. Afebrile with pulse ranging from 100-90s bpm. Systolic blood pressures ranging from 90-70s mmHg via arterial line (was on epinephrine, vasopressin and Levophed overnight however no Giapreza, epinephrine and vasopressin). He is saturating +99% on 5 liters of nasal cannula with no documented UOP in the last 24 hours. Net positive ~830 mL.    Review of patient's allergies  indicates:  No Known Allergies  Current Facility-Administered Medications   Medication Frequency    0.9%  NaCl infusion (CRRT USE ONLY) Continuous    0.9%  NaCl infusion (for blood administration) Q24H PRN    0.9%  NaCl infusion (for blood administration) Q24H PRN    0.9%  NaCl infusion Continuous    acetaminophen tablet 650 mg Q6H PRN    albumin human 5% bottle 25 g PRN    albuterol sulfate nebulizer solution 2.5 mg Q4H PRN    amiodarone tablet 400 mg Daily    angiotensin II (GIAPREZA) 2,500,000 ng in sodium chloride 0.9% 250 mL infusion Continuous    atorvastatin tablet 40 mg Daily    bisacodyL suppository 10 mg Daily PRN    calcium gluconate 3 g in dextrose 5 % (D5W) 100 mL infusion Continuous    dextrose 10 % infusion Continuous PRN    dextrose 10% bolus 125 mL 125 mL PRN    dextrose 10% bolus 250 mL 250 mL PRN    dextrose-sod citrate-citric ac 2.45-2.2 gram- 800 mg/100 mL Soln Continuous    EPINEPHrine 5 mg in dextrose 5% 250 mL infusion (premix) Continuous    glucagon (human recombinant) injection 1 mg PRN    heparin 25,000 units in dextrose 5% 250 mL (100 units/mL) infusion (heparin infusion - NO NOMOGRAM) Continuous    insulin aspart U-100 pen 0-10 Units Q4H PRN    insulin aspart U-100 pen 7 Units 6 times per day    magnesium hydroxide 400 mg/5 ml suspension 2,400 mg Daily PRN    magnesium sulfate 2g in water 50mL IVPB (premix) PRN    meropenem (MERREM) 2 g in sodium chloride 0.9% 100 mL IVPB Q12H    micafungin 100 mg in sodium chloride 0.9 % 100 mL IVPB (MB+) Q24H    midodrine tablet 15 mg TID    niCARdipine 40 mg/200 mL (0.2 mg/mL) infusion Continuous    nitric oxide gas Gas 5 ppm Continuous    NORepinephrine 4 mg in dextrose 5% 250 mL infusion (premix) Continuous    pantoprazole injection 40 mg BID    polyethylene glycol packet 17 g BID    potassium chloride 20 mEq in 100 mL IVPB (FOR CENTRAL LINE ADMINISTRATION ONLY) PRN    potassium chloride 20 mEq in 100 mL IVPB (FOR CENTRAL LINE ADMINISTRATION ONLY)  PRN    QUEtiapine tablet 50 mg Q6H PRN    senna-docusate 8.6-50 mg per tablet 2 tablet BID    sodium chloride 0.9% flush 10 mL PRN    sodium phosphate 20.01 mmol in dextrose 5 % (D5W) 250 mL IVPB PRN    sodium phosphate 30 mmol in dextrose 5 % (D5W) 250 mL IVPB PRN    sodium phosphate 39.99 mmol in dextrose 5 % (D5W) 250 mL IVPB PRN    vancomycin - pharmacy to dose pharmacy to manage frequency    vasopressin (PITRESSIN) 0.2 Units/mL in dextrose 5 % (D5W) 100 mL infusion Continuous    warfarin (COUMADIN) tablet 2 mg Daily       Objective:     Vital Signs (Most Recent):  Temp: 98.4 °F (36.9 °C) (12/11/23 0300)  Pulse: 91 (12/11/23 0630)  Resp: 18 (12/08/23 2305)  BP: (!) 86/0 (12/11/23 0300)  SpO2: 99 % (12/10/23 1915) Vital Signs (24h Range):  Temp:  [97.6 °F (36.4 °C)-98.4 °F (36.9 °C)] 98.4 °F (36.9 °C)  Pulse:  [] 91  SpO2:  [99 %] 99 %  BP: (78-88)/(0) 86/0  Arterial Line BP: ()/(55-76) 90/71     Weight: 78 kg (172 lb) (12/10/23 0509)  Body mass index is 23.33 kg/m².  Body surface area is 1.99 meters squared.    I/O last 3 completed shifts:  In: 05152 [I.V.:2828.3; Blood:350; NG/GT:910; IV Piggyback:7280.7]  Out: 39577 [Other:76335]    Physical Exam  Vitals and nursing note reviewed.   Constitutional:       General: He is awake. He is not in acute distress.     Appearance: He is ill-appearing. He is not diaphoretic.      Interventions: Nasal cannula in place.   HENT:      Head: Normocephalic and atraumatic.      Right Ear: External ear normal.      Left Ear: External ear normal.      Nose:      Comments: NG tube to left nostril and nasal cannula in place.     Mouth/Throat:      Mouth: Mucous membranes are moist.      Pharynx: Oropharynx is clear. No oropharyngeal exudate or posterior oropharyngeal erythema.   Eyes:      General: No scleral icterus.        Right eye: No discharge.         Left eye: No discharge.      Extraocular Movements: Extraocular movements intact.      Conjunctiva/sclera:  Conjunctivae normal.   Neck:      Comments: Trialysis catheter to left internal jugular vein.   Cardiovascular:      Rate and Rhythm: Normal rate.      Heart sounds: Murmur (humming sound, VAD) heard.      Systolic murmur is present.      No friction rub. No gallop.   Pulmonary:      Breath sounds: Rales present. No wheezing.      Comments: Bibasilar crackles appreciated.   Chest:      Comments: LVAD present.   Abdominal:      General: Bowel sounds are normal. There is no distension.      Palpations: Abdomen is soft.   Musculoskeletal:      Cervical back: Neck supple.      Right lower leg: No edema.      Left lower leg: No edema.   Skin:     General: Skin is warm and dry.      Coloration: Skin is not jaundiced.   Neurological:      Mental Status: He is alert. Mental status is at baseline. He is confused.      Cranial Nerves: No cranial nerve deficit.   Psychiatric:         Mood and Affect: Mood normal.         Behavior: Behavior is cooperative.          Significant Labs:  ABGs:   Recent Labs   Lab 12/11/23  0502   PH 7.448   PCO2 35.9   HCO3 24.8   POCSATURATED 100   BE 1       BMP:   Recent Labs   Lab 12/11/23  0405   *  199*  199*   *  135*  136   K 4.2  4.2  4.1     103  103   CO2 20*  20*  20*   BUN 7*  7*  8   CREATININE 0.8  0.8  0.8   CALCIUM 8.7  8.7  8.8   MG 2.0       CBC:   Recent Labs   Lab 12/11/23  0405   WBC 27.70*   RBC 2.68*   HGB 7.7*   HCT 23.2*      MCV 87   MCH 28.7   MCHC 33.2       CMP:   Recent Labs   Lab 12/11/23  0405   *  199*  199*   CALCIUM 8.7  8.7  8.8   ALBUMIN 2.1*  2.1*  2.1*  2.1*   PROT 6.5  6.5   *  135*  136   K 4.2  4.2  4.1   CO2 20*  20*  20*     103  103   BUN 7*  7*  8   CREATININE 0.8  0.8  0.8   ALKPHOS 174*  174*   ALT 27  27   AST 47*  47*   BILITOT 1.3*  1.4*       Coagulation:   Recent Labs   Lab 12/11/23  0405 12/11/23  0502   INR 1.2  --    APTT  --  39.6*       LFTs:   Recent Labs    Lab 12/11/23  0405   ALT 27  27   AST 47*  47*   ALKPHOS 174*  174*   BILITOT 1.3*  1.4*   PROT 6.5  6.5   ALBUMIN 2.1*  2.1*  2.1*  2.1*       Microbiology Results (last 7 days)       Procedure Component Value Units Date/Time    Blood culture [[id number]] Collected: 12/10/23 1240    Order Status: Completed Specimen: Blood from Peripheral, Antecubital, Right Updated: 12/11/23 0145     Blood Culture, Routine No Growth to date    Blood culture [[id number]] Collected: 12/10/23 1149    Order Status: Completed Specimen: Blood from Line, Jugular, External Right Updated: 12/11/23 0145     Blood Culture, Routine No Growth to date    Culture, Respiratory [[id number]] Collected: 12/06/23 1531    Order Status: Completed Specimen: Respiratory from Bronchial Wash Updated: 12/08/23 1103     Respiratory Culture No growth     Gram Stain (Respiratory) Moderate WBC's     Gram Stain (Respiratory) Rare Gram negative rods    Narrative:      Bronchial Wash    AFB Culture & Smear [[id number]] Collected: 12/06/23 1532    Order Status: Completed Specimen: Body Fluid from Lung, Right Updated: 12/07/23 2127     AFB Culture & Smear Culture in progress     AFB CULTURE STAIN No acid fast bacilli seen.    Narrative:      Bronchial Wash    Direct AFB stain [[id number]] Collected: 12/06/23 1532    Order Status: Completed Specimen: Body Fluid from Lung, Right Updated: 12/06/23 2257     Direct Acid Fast No acid fast bacilli seen.    Narrative:      Bronchial Wash    Fungus culture [[id number]] Collected: 12/06/23 1532    Order Status: Sent Specimen: Body Fluid from Lung, Right Updated: 12/06/23 1719    Gram stain [[id number]] Collected: 12/06/23 1532    Order Status: Canceled Specimen: Body Fluid from Lung, Right           Specimen (24h ago, onward)      None          Significant Imaging:  I have reviewed all imagining in the last 24 hours.  Assessment/Plan:     Cardiac/Vascular  * Acute on chronic combined systolic and diastolic CHF, NYHA  class 4  LVAD (left ventricular assist device) present  Pre-transplant evaluation for heart transplant  Patient is identified as having Combined Systolic and Diastolic heart failure that is Acute on chronic. CHF is currently uncontrolled due to JVD, Rales/crackles on pulmonary exam, and Pulmonary edema/pleural effusion on CXR. Latest ECHO performed and demonstrates- Results for orders placed during the hospital encounter of 11/09/23    Echo    Interpretation Summary    Left Ventricle: The left ventricle is moderately dilated. Mildly increased ventricular mass. Normal wall thickness. There is eccentric hypertrophy. Severe global hyperkinesis present. There is severely reduced systolic function with a visually estimated ejection fraction of 10 -15%. Grade III diastolic dysfunction.    Right Ventricle: Normal right ventricular cavity size. Systolic function is normal. Pacemaker lead present in the ventricle.    Left Atrium: Left atrium is severely dilated.    Mitral Valve: There is annular and bileaflet tethering. There is moderate regurgitation with a centrally directed jet.    Tricuspid Valve: There is mild regurgitation with a centrally directed jet.    Pulmonary Artery: The estimated pulmonary artery systolic pressure is 41 mmHg.    IVC/SVC: Normal venous pressure at 3 mmHg.    last BNP reviewed- and noted below   Recent Labs   Lab 12/11/23  0405   BNP 2,154*       - management per primary team  - LVAD for mechanical support and epinephrine for ionotropic support  - UF with SLED    Renal/  Acute renal failure with acute tubular necrosis superimposed on stage 3b chronic kidney disease  - suspect acute tubular injury secondary to hypotension/cardiogenic shock likely compounded by intra-arterial contrast and anemia with urinary sediment with granular casts  - continue with continuous SLED for metabolic clearance and volume management, on citrate to prevent clotting   - Q8H RFPs, ionized calcium and magnesium levels  per SLED protocol   - renal diet/tube feeds when not NPO, volume restriction per primary team  - strict I/O's and daily weights  - keep MAP > 65 mmHg  - renally all dose medications to eGFR  - avoid nephrotoxic agents wean feasible (i.e. NSAIDs, intra-arterial contrast, supra-therapeutic vancomycin levels, etc.)    Thank you for your consult. I will follow-up with patient. Please contact us if you have any additional questions.    Henok Ayon MD  Nephrology  [facility] - Surgical Intensive Care    ATTENDING PHYSICIAN ATTESTATION  I have personally verified the history and examined the patient. I thoroughly reviewed the demographic, clinical, laboratorial and imaging information available in medical records. I agree with the assessment and recommendations provided by the subspecialty resident who was under my supervision.     DIALYSIS (CRRT) NOTE  Patient evaluated while undergoing Slow Low Efficiency Dialysis (SLED) indicated for IVÁN and hemodynamic instability. No complications, tolerating session with current UFR. Will continue current support.

## 2023-12-11 NOTE — ASSESSMENT & PLAN NOTE
-multifactorial  -likely metabolic encephalopathy +/- icu delirium   -CTH 12/7 negative for acute process  -Continue RRT for metabolic clearance  -Wean sedation as able  -EEG done over the weekend no seizures detected, just generalized slowing  -provide frequent re-orientation  -Promote sleep/wake cycle   -Neurology consulted   - Much more awake this AM, continue to encourage and motivate

## 2023-12-11 NOTE — PROGRESS NOTES
12/11/23 0050   Treatment   Treatment Type SLED   Treatment Status Daily equipment check   Dialysis Machine Number [id number]   Dialyzer Time (hours) 16   BVP (Liters) 138 L   Solutions Labeled and Current  Yes   Access Temporary Cath;Left;IJ   Catheter Dressing Intact  Yes   Alarms Engaged Yes   CRRT Comments daily check done

## 2023-12-11 NOTE — PLAN OF CARE
Pt seen for initial swallow assessment. Pt presenting as high aspiration risk. Ice chips sparingly for ongoing comfort. Continue with NG tube as primary means of nutrition/hydration/medication.

## 2023-12-11 NOTE — ASSESSMENT & PLAN NOTE
Patient is identified as having Combined Systolic and Diastolic heart failure that is Acute on chronic. CHF is currently uncontrolled due to JVD, Rales/crackles on pulmonary exam, and Pulmonary edema/pleural effusion on CXR. Latest ECHO performed and demonstrates- Results for orders placed during the hospital encounter of 11/09/23    Echo    Interpretation Summary    Left Ventricle: The left ventricle is moderately dilated. Mildly increased ventricular mass. Normal wall thickness. There is eccentric hypertrophy. Severe global hyperkinesis present. There is severely reduced systolic function with a visually estimated ejection fraction of 10 -15%. Grade III diastolic dysfunction.    Right Ventricle: Normal right ventricular cavity size. Systolic function is normal. Pacemaker lead present in the ventricle.    Left Atrium: Left atrium is severely dilated.    Mitral Valve: There is annular and bileaflet tethering. There is moderate regurgitation with a centrally directed jet.    Tricuspid Valve: There is mild regurgitation with a centrally directed jet.    Pulmonary Artery: The estimated pulmonary artery systolic pressure is 41 mmHg.    IVC/SVC: Normal venous pressure at 3 mmHg.    last BNP reviewed- and noted below   Recent Labs   Lab 12/11/23  0405   BNP 2,154*       - management per primary team  - LVAD for mechanical support and epinephrine for ionotropic support  - UF with SLED

## 2023-12-11 NOTE — ASSESSMENT & PLAN NOTE
Neuro/Psych:   -- Sedation: none; seroquel 50 Q6 PRN for agitation  -- Pain: d/c all narcotics  -- gabapentin 300mg QHS  -- psych consulted              Cardiac:   -- S/P redo sternotomy LVAD with Dr. Washington on 12/01, chest closed 12/4  -- MAP Goal: 70-80  -- Cardene PRN  -- Pressors: epi @ 0.04mcg/kg/min, Heavenly 15-20. Titrating vaso on.  -- midodrine 15 TID  -- Anti-HTNs: none  -- Rhythm: NSR  -- amiodarone 400mg daily  -- LVAD heartmate III: RPM 4900, flow 3.6  -- Statin: Atorvastatin 40 mg QD  -- No ASA at this time  -- heparin 1100/hr, PTT goal 45-54  -- coumadin 2mg      Pulmonary:   -- Goal SpO2 >92%  -- supplemental O2 as needed      Renal:  -- Trend BUN/Cr   -- Maintain [place], record strict Is/Os  -- on SLED     Recent Labs   Lab 12/10/23  1342 12/10/23  2204 12/11/23  0405   BUN 5*  5* 7* 7*  7*  8   CREATININE 0.7  0.7 0.7 0.8  0.8  0.8        FEN / GI:   -- Daily CMP, PRN K/Mag/Phos per protocol   -- Replace electrolytes as needed  -- Nutrition: TF at goal 40/hr, having BM  -- Bowel Regimen: Miralax, docusate  -- SLP consult       ID:   -- Afebrile  -- WBC increasing, ID consulted  -- Abx: Vanc, alphonse, micafungin    Recent Labs   Lab 12/09/23  0323 12/10/23  0406 12/11/23  0405   WBC 10.13 17.57* 27.70*           Heme/Onc:   -- Hgb 8.7 pre-operatively  -- received 7 RBC, 2 FFP, 1 platelet, 10 cry intra-op  -- q4 fibrinogen - keep fibrinogen >300  -- holding ASA, on coumadin       Recent Labs   Lab 12/09/23  0323 12/09/23  0813 12/10/23  0406 12/10/23  1127 12/10/23  1715 12/11/23  0014 12/11/23  0405 12/11/23  0502   HGB 7.1*  --  6.6*  --   --   --  7.7*  --      --  253  --   --   --  287  --    APTT  --    < > 30.2   < > 27.1 36.5*  --  39.6*   INR 1.1   < > 1.1   < > 1.2 1.2 1.2  --     < > = values in this interval not displayed.           Endocrine:   -- CTS Goal -140  -- HgbA1c: 7.6  -- Endocrinology consulted for insulin management      PPx:   Feeding:TF at  goal  Analgesia/Sedation: seroquel 50 Q6  Thromboembolic Prevention: SCDs  HOB >30: Yes  Stress Ulcer: pantoprazole   Glucose Control: Yes, insulin management per Endocrinology  Bowel reg: Miralax, docusate  Invasive Lines/Drains/Airway:   OGT  Left radial arterial line    PICC line   LVAD     Dispo/Code Status/Palliative:     - Continue SICU Care    - Full Code

## 2023-12-11 NOTE — NURSING
Dx: Acute on chronic combined systolic and diastolic CHF, NYHA class 4    Shift Events: Angiotensin II started.    Neuro: Oriented to person, place, situation.  Follows commands and moves all extremities.     Cards: NSR, LVAD HM 3 Speed 4900    Respiratory: 5L NC w/ 5iNO    Vital Signs: BP (!) 86/0 (BP Location: Left arm, Patient Position: Lying)   Pulse 91   Temp 98.4 °F (36.9 °C) (Oral)   Resp 18   Ht 6' (1.829 m)   Wt 78 kg (172 lb)   SpO2 99%   BMI 23.33 kg/m²     Diet: NPO and Tube Feeds    Gtts: Angiotensin II, Epi, Heparin    Urine Output: Anuric 0 cc/shift    LVAD HM3 Speed 4900. See flowsheets     Labs/Accuchecks: Daily labs. Q8hr renal/mg.  Q6 ptt. Q4 accuchecks.     Skin: No new skin break down noted. Weight shift assistance provided Q2hrs, patient on immerse bed.  Heel boots on.

## 2023-12-11 NOTE — SUBJECTIVE & OBJECTIVE
Interval History/Significant Events: Pressor requirements overnight Epi 0.04 and Heavenly 15. RIJ trialysis placed.     Follow-up For: Procedure(s) (LRB):  CLOSURE, WOUND, STERNUM (N/A)  INSERTION, GRAFT, PERICARDIUM  DRAINAGE, PLEURAL EFFUSION  w  Post-Operative Day: 7 Days Post-Op    Objective:     Vital Signs (Most Recent):  Temp: 97.7 °F (36.5 °C) (12/11/23 0730)  Pulse: 91 (12/11/23 0732)  Resp: 20 (12/11/23 0732)  BP: (!) 86/0 (12/11/23 0300)  SpO2: 98 % (12/11/23 0857) Vital Signs (24h Range):  Temp:  [97.6 °F (36.4 °C)-98.4 °F (36.9 °C)] 97.7 °F (36.5 °C)  Pulse:  [] 91  Resp:  [20] 20  SpO2:  [98 %-99 %] 98 %  BP: (78-88)/(0) 86/0  Arterial Line BP: ()/(55-76) 81/60     Weight: 78 kg (172 lb)  Body mass index is 23.33 kg/m².      Intake/Output Summary (Last 24 hours) at 12/11/2023 1022  Last data filed at 12/11/2023 0800  Gross per 24 hour   Intake 7783.9 ml   Output 6678 ml   Net 1105.9 ml          Physical Exam  Constitutional:       General: He is not in acute distress.  HENT:      Head: Normocephalic and atraumatic.      Mouth/Throat:      Mouth: Mucous membranes are dry.      Pharynx: Oropharynx is clear.   Eyes:      Extraocular Movements: Extraocular movements intact.      Pupils: Pupils are equal, round, and reactive to light.   Cardiovascular:      Rate and Rhythm: Normal rate.      Pulses: Normal pulses.   Pulmonary:      Effort: Pulmonary effort is normal.   Abdominal:      Palpations: Abdomen is soft.   Musculoskeletal:         General: Normal range of motion.      Cervical back: Normal range of motion.   Skin:     General: Skin is warm and dry.   Neurological:      General: No focal deficit present.      Mental Status: He is alert.            Vents:  Vent Mode: Spont (12/09/23 0843)  Ventilator Initiated: Yes (12/06/23 1233)  Set Rate: 18 BPM (12/09/23 0255)  Vt Set: 450 mL (12/09/23 0255)  Pressure Support: 10 cmH20 (12/09/23 0843)  PEEP/CPAP: 5 cmH20 (12/09/23 0843)  Oxygen  Concentration (%): 40 (12/09/23 1000)  Peak Airway Pressure: 16 cmH20 (12/09/23 0843)  Plateau Pressure: 18 cmH20 (12/09/23 0843)  Total Ve: 8.94 L/m (12/09/23 0843)  Negative Inspiratory Force (cm H2O): -25 (12/09/23 1010)  F/VT Ratio<105 (RSBI): (!) 57.47 (12/08/23 1535)    Lines/Drains/Airways       Peripherally Inserted Central Catheter Line  Duration             PICC Triple Lumen 12/05/23 1208 right basilic 5 days              Central Venous Catheter Line  Duration             Trialysis (Dialysis) Catheter 12/10/23 1215 right internal jugular <1 day              Drain  Duration                  NG/OG Tube 12/04/23 1730 Left nostril 6 days              Arterial Line  Duration             Arterial Line 12/06/23 1526 Left Radial 4 days              Line  Duration                  VAD 12/01/23 1015 Left ventricular assist device HeartMate 3 10 days              Peripheral Intravenous Line  Duration                  Peripheral IV - Single Lumen 12/08/23 0620 20 G Left Forearm 3 days                    Significant Labs:    CBC/Anemia Profile:  Recent Labs   Lab 12/10/23  0406 12/11/23  0405 12/11/23  0952   WBC 17.57* 27.70*  --    HGB 6.6* 7.7*  --    HCT 21.1* 23.2* 25*    287  --    MCV 90 87  --    RDW 18.8* 17.2*  --         Chemistries:  Recent Labs   Lab 12/10/23  0406 12/10/23  1127 12/10/23  1342 12/10/23  1715 12/10/23  2204 12/11/23  0405     --  138  138  --  134* 135*  135*  136   K 4.4   < > 4.5  4.5 4.4 3.9 4.2  4.2  4.1     --  104  104  --  103 103  103  103   CO2 20*  --  22*  22*  --  18* 20*  20*  20*   BUN 6*  --  5*  5*  --  7* 7*  7*  8   CREATININE 0.7  --  0.7  0.7  --  0.7 0.8  0.8  0.8   CALCIUM 8.5*  --  8.6*  8.6*  --  8.2* 8.7  8.7  8.8   ALBUMIN 2.1*  --  2.2*  2.2*  --  2.0* 2.1*  2.1*  2.1*  2.1*   PROT 6.1  --   --   --   --  6.5  6.5   BILITOT 1.0  --   --   --   --  1.3*  1.4*   ALKPHOS 145*  --   --   --   --  174*  174*   ALT 26  --    --   --   --  27  27   AST 55*  --   --   --   --  47*  47*   MG 1.9   < > 2.0  2.0 1.9 1.8 2.0   PHOS 3.4  --  1.2*  1.2*  --  2.1* 2.5*  2.5*  2.5*    < > = values in this interval not displayed.       Significant Imaging:  I have reviewed all pertinent imaging results/findings within the past 24 hours.

## 2023-12-11 NOTE — PROGRESS NOTES
[facility] - Surgical Intensive Care  Adult Nutrition  Progress Note    SUMMARY       Recommendations    1. If renal formula warranted for IVÁN: Recommend Novasource renal 40 ml/hr to provide 1920 kcal, 87 g pro, 688 mL water, FWF per MD (96% EEN and 83% of EPN).     -As pressor requirements increase, rec'd trickle feeds for best tolerance. Avoid holding TF for residuals less than 500mL unless associated with other s/s of intolerance such as N/V/D, abd pain/distention.                 - consider adding BenePRO BID if CRRT continues (PRO needs will be higher), provides 12g PRO.       2. Alternate TF recs: If new TF formula is medically warranted, recommend Impact Peptide 1.5 at trickle rate and slowly increase to goal rate of 55ml/hr to provide 1980 kcal, 124g PRO, and 1016ml FF, FWF per MD (100% of EEN/EPN).                 - renal friendly formula                 - Higher PRO formula     3. If diarrhea or loose stools occur, consider adding psyllium husk BID to help bulk up stools.      4. RD to monitor wt, tolerance, skin, labs.       Goals: Meet % of EEN/EPN by RD f/u date  Nutrition Goal Status: goal met  Communication of RD Recs:  (POC)    Assessment and Plan    Nutrition Problem  Increased protein needs      Related to (etiology):   Physiological needs      Signs and Symptoms (as evidenced by):   HF     Interventions (treatment strategy):  Collaboration of nutrition care w/ other providers   LVAD placed 12/1     Nutrition Diagnosis Status:   Continues      Reason for Assessment    Reason For Assessment: RD follow-up  Diagnosis: cardiac disease (CHF/ s/p LVAD on 12/1)  Relevant Medical History: CAD, DMT2, CHF, PAF  Interdisciplinary Rounds: did not attend  General Information Comments: RD f/u: pt was extubated on 12/9. Pt continues with CRRT therapy. TF were turned off today. RD spoke with RN about why, RN stated that since extubated, pressor requirements have increased. SLP had swallowing trials today at  bedside, SLP continues to recommend NPO. Caregiver was present in the room. Caregiver requested some VitK edu, RD to provide next f/u (tentatively on 12/13). Pt's #, admit wt was 173#. No tolerance issues previously noted, per pt's RN. JASMYNE to continue to monitor and f/u.  Nutrition Discharge Planning: Cardiac diet education provided 11/15- no additional questions for me at this time.    Nutrition Risk Screen    Nutrition Risk Screen: tube feeding or parenteral nutrition    Nutrition/Diet History    Spiritual, Cultural Beliefs, Congregational Practices, Values that Affect Care: no  Food Allergies: NKFA    Anthropometrics    Temp: 98.2 °F (36.8 °C)  Height Method: Stated  Height: 6' (182.9 cm)  Height (inches): 72 in  Weight Method: Bed Scale  Weight: 78 kg (172 lb)  Weight (lb): 172 lb  Ideal Body Weight (IBW), Male: 178 lb  % Ideal Body Weight, Male (lb): 96.63 %  BMI (Calculated): 23.3  BMI Grade: 25 - 29.9 - overweight    Lab/Procedures/Meds    Pertinent Labs Reviewed: reviewed  Pertinent Labs Comments: Gluc: 199,  phos: 2.5, alb: 2.1, AST: 47, CRP: 288.6  Pertinent Medications Reviewed: reviewed  Pertinent Medications Comments: Amiodarone, statin, Fe, pantoprazole, abx, senna-docusate, NaCl, polyethylene glycol, coumadin, angiotensin II, epi, heparin, nitric, nor-epi, vasopressin    Estimated/Assessed Needs    Weight Used For Calorie Calculations: 78 kg (171 lb 15.3 oz)  Energy Calorie Requirements (kcal): 2029 kcal  Energy Need Method: [place] (MSJ x 1.25)      Protein Requirements: 105- 131g (1.2-1.5g/kg (CRRT))  Weight Used For Protein Calculations: 87.1 kg (192 lb 0.3 oz)      Fluid Requirements (mL): 1 ml/kcal or per MD     RDA Method (mL): 2029    Nutrition Prescription Ordered    Current Diet Order: NPO  Current Nutrition Support Formula Ordered: Novasource Renal  Current Nutrition Support Rate Ordered: 40 (ml)  Current Nutrition Support Frequency Ordered: ml/hr x 24hrs    Evaluation of Received  Nutrient/Fluid Intake    Enteral Calories (kcal): 1920  Enteral Protein (gm): 87  Enteral (Free Water) Fluid (mL): 688  % Kcal Needs: 95%  % Protein Needs: 84%  I/O: -875ml since 12/4  Energy Calories Required: meeting needs  Protein Required: meeting needs  Fluid Required:  (Per MD)  Total Fluid Intake (mL/kg): 1 ml/kcal or per MD  Comments: LBM 12/10  Tolerance: tolerating  % Intake of Estimated Energy Needs: 0 - 25 %  % Meal Intake: Other: TF were turned off    Nutrition Risk    Level of Risk/Frequency of Follow-up:  (RD to f/u x 1-2/week)     Monitor and Evaluation    Food and Nutrient Intake: energy intake, food and beverage intake, enteral nutrition intake, parenteral nutrition intake  Food and Nutrient Adminstration: diet order, enteral and parenteral nutrition administration  Knowledge/Beliefs/Attitudes: food and nutrition knowledge/skill, beliefs and attitudes  Physical Activity and Function: nutrition-related ADLs and IADLs, factors affecting access to physical activity  Anthropometric Measurements: height/length, weight, weight change, body mass index, growth pattern indices/percentile ranks  Biochemical Data, Medical Tests and Procedures: electrolyte and renal panel, gastrointestinal profile, glucose/endocrine profile, inflammatory profile, lipid profile  Nutrition-Focused Physical Findings: overall appearance, extremities, muscles and bones, head and eyes, skin     Nutrition Follow-Up    RD Follow-up?: Yes

## 2023-12-11 NOTE — PROGRESS NOTES
[facility] - Surgical Intensive Care  Infectious Disease  Progress Note    Patient Name: Radha Abbott  MRN: [id number]  Admission Date: 11/9/2023  Length of Stay: 32 days  Attending Physician: Yuri Washington MD  Primary Care Provider: Vasu Kong MD    Isolation Status: No active isolations  Assessment/Plan:      ID  Septic shock  62 yo male with LVAD placement for DT on 12/1, post op complicated by diffuse coagulopathy and RV dysfunction with subsequent chest closure on 12/4. Hospital course complicated by respiratory failure s/p intubation, with bronch on 12/6 notable for RLL mucus plug - cultures no growth to date. Also noted to have increasing pressor requirements and leukocytosis over the past 24 hours. CT c/a/p pending. ECHO repeated- in process.    Recommendations:  -continue empiric broad spectrum antimicrobials - vancomycin pharm to dose, meropenem and micafungin pending further work up/cx  -f/u cx, so far no growth to date  -agree with CT to evaluate for potential source               Thank you for your consult. I will follow-up with patient. Please contact us if you have any additional questions. Above d/w primary team.     Critical care time: 35 minutes   I personally spent critical care time on the following: evaluating this patient's organ dysfunction, development of treatment plan, discussing treatment plan with patient or surrogate and bedside caregivers, discussions with critical care service and/or consultants, evaluation of patient's response to treatment, physical examination of patient, ordering and review of treatments interventions, laboratory studies, and radiographic studies, re-evaluation of patient's condition. This critical care time did not overlap with that of any other provider of the same specialty or involve time for procedures.       Layne Robles MD  Infectious Disease  [facility] - Surgical Intensive Care    Subjective:     Principal Problem:Acute on chronic combined  systolic and diastolic CHF, NYHA class 4    HPI:  Mr. Abbott is a 63-year-old man who underwent destination therapy left ventricular assist device placement.  Postoperatively the chest was left open due to diffuse coagulopathy in RV dysfunction.  He did well post-operatively and underwent washout and closure on 12/4. The patient was extubated but had to be reintubated. A BAL was performed: Gram stain with GNB, culture is NGTD. ID is consulted for BAL results. The patient is agitated but quiets down with reassurance. No fever or chills. Ecgo done this am - results pending. Family at bedside.     Interval History: Uptrending leukocytosis, plan for CT scan today. No fevers documented overnight.   Reports new R sided abdo pain that started this morning near DL.     Review of Systems  Objective:     Vital Signs (Most Recent):  Temp: 97.7 °F (36.5 °C) (12/11/23 0730)  Pulse: 91 (12/11/23 0732)  Resp: 20 (12/11/23 0732)  BP: (!) 86/0 (12/11/23 0300)  SpO2: 98 % (12/11/23 0857) Vital Signs (24h Range):  Temp:  [97.6 °F (36.4 °C)-98.4 °F (36.9 °C)] 97.7 °F (36.5 °C)  Pulse:  [] 91  Resp:  [20] 20  SpO2:  [98 %-99 %] 98 %  BP: (78-88)/(0) 86/0  Arterial Line BP: ()/(55-76) 81/60     Weight: 78 kg (172 lb)  Body mass index is 23.33 kg/m².    Estimated Creatinine Clearance: 106.4 mL/min (based on SCr of 0.8 mg/dL).     Physical Exam  Constitutional:       General: He is not in acute distress.  HENT:      Nose:      Comments: NGT  Eyes:      General:         Right eye: No discharge.         Left eye: No discharge.   Cardiovascular:      Comments: Preveena  Pulmonary:      Effort: Pulmonary effort is normal.   Abdominal:      Comments: Abdominal dressing   Musculoskeletal:      Right lower leg: No edema.      Left lower leg: No edema.   Skin:     General: Skin is warm and dry.      Comments: R picc  RIJ  R groin incision site - no erythema, drainage or induration       Neurological:      Mental Status: He is alert.           Significant Labs:   Microbiology Results (last 7 days)       Procedure Component Value Units Date/Time    Blood culture [[id number]] Collected: 12/10/23 1240    Order Status: Completed Specimen: Blood from Peripheral, Antecubital, Right Updated: 12/11/23 0145     Blood Culture, Routine No Growth to date    Blood culture [[id number]] Collected: 12/10/23 1149    Order Status: Completed Specimen: Blood from Line, Jugular, External Right Updated: 12/11/23 0145     Blood Culture, Routine No Growth to date    Culture, Respiratory [[id number]] Collected: 12/06/23 1531    Order Status: Completed Specimen: Respiratory from Bronchial Wash Updated: 12/08/23 1103     Respiratory Culture No growth     Gram Stain (Respiratory) Moderate WBC's     Gram Stain (Respiratory) Rare Gram negative rods    Narrative:      Bronchial Wash    AFB Culture & Smear [[id number]] Collected: 12/06/23 1532    Order Status: Completed Specimen: Body Fluid from Lung, Right Updated: 12/07/23 2127     AFB Culture & Smear Culture in progress     AFB CULTURE STAIN No acid fast bacilli seen.    Narrative:      Bronchial Wash    Direct AFB stain [[id number]] Collected: 12/06/23 1532    Order Status: Completed Specimen: Body Fluid from Lung, Right Updated: 12/06/23 2257     Direct Acid Fast No acid fast bacilli seen.    Narrative:      Bronchial Wash    Fungus culture [[id number]] Collected: 12/06/23 1532    Order Status: Sent Specimen: Body Fluid from Lung, Right Updated: 12/06/23 1719    Gram stain [[id number]] Collected: 12/06/23 1532    Order Status: Canceled Specimen: Body Fluid from Lung, Right             Significant Imaging: I have reviewed all pertinent imaging results/findings within the past 24 hours.

## 2023-12-11 NOTE — ASSESSMENT & PLAN NOTE
Pt with known ICM with an EF of 25% on home  presented with decompensated HF.  Not in cardiogenic shock    RHC 11/14: RA 14, PA 70/35, PCWP 32, CO 4.1, CI 2.1.   Repeat RHC 11/20 RA RA  20  PA 60/29 (39)  PCWP 29    CO  4.6 l/min  CI  2.3 l/min/m2  2D echo 11/21 showed EF 15-20%, mild RV dysfunction, mild MR, LVEDD 6.9     Repeat on 11/24 showed EF 10-15%, RV mildly reduced, mild-mod MR, LVEDD 6.42  - Home  gtt at 5  - home GDMT: entresto 24/26mg BID (on hold 2/2 IVÁN), Hydralazine 25 q8h - held prior to LVAD implant   - home diuretics: Was taking lasix 40 bid  - Bridged to LVAD with IABP s/p HM3 implant 12/1        -PT/OT and nutrition   -s/p LVAD (12/1) Speed increased on 12/7 to 4900 RPMs  - Supported on giapreza, Ketty, epi, vaso  - f/u repeat echo  - CTS primary

## 2023-12-11 NOTE — PROGRESS NOTES
1914 Angiotensin II gtt started, Epinephrine gtt @ 0.04 mcg/kg/min, Vasopressin gtt @ 0.04 Units/min, Norepinephrine gtt @ 0.08 mcg/kg/min, MAP 79. Within 5 mins MAP increased to 100, PI 10, flow 3.2, Vasopressin and Norepinephrine gtt paused.     1941: Spoke with Dr. Washington as pt MAP has remained 95-98 on Angiotensin II @ 40 ng/kg/min and Epinephrine gtt 0.04 mcg/kg/min, per MD Angiotensin II gtt decreased to 30 ng/kg/min.     2040: MAPs sustaining 89-91, CVP flat 8, per MD order Angiotensin II gtt decreased to 20 ng/kg/min, flow 3.5, PI 6.4.

## 2023-12-11 NOTE — PT/OT/SLP PROGRESS
Occupational Therapy      Patient Name:  Radha Abbott   MRN:  [id number]    Patient not seen today due to Nurse/ ENRRIQUE hold (pt on CRRT + multiple pressors + giapreza). Will follow-up for progressive mobility pending continued medical stability and patient participation.       12/11/2023

## 2023-12-11 NOTE — PROGRESS NOTES
12/11/2023  Alondra Dykes    Current provider:  Yuri Washington MD    Device interrogation:      12/11/2023     2:00 PM 12/11/2023     1:00 PM 12/11/2023    11:00 AM 12/11/2023    10:00 AM 12/11/2023     9:00 AM 12/11/2023     8:00 AM 12/11/2023     7:00 AM   TXP LVAD INTERROGATIONS   Type HeartMate3 HeartMate3 HeartMate3 HeartMate3 HeartMate3 HeartMate3 HeartMate3   Flow 4.2 4.1 3.9 4 4 4 4   Speed 4900 4900 4900 4900 4900 4900 4900   PI 3.7 4.4 4.5 4.4 4.4 3.9 5.3   Power ([place]) 3.2 3.3 3.5 3.3 3.2 4.5 3.3   LSL 4500 4500 4500 4500 4500 4500 4500   Pulsatility Intermittent pulse Intermittent pulse Intermittent pulse Intermittent pulse Intermittent pulse Intermittent pulse Intermittent pulse          Rounded on [facility] to ensure all mechanical assist device settings (IABP or VAD) were appropriate and all parameters were within limits.  I was able to ensure all back up equipment was present, the staff had no issues, and the Perfusion Department daily rounding was complete.      For implantable VADs: Interrogation of Ventricular assist device was performed with analysis of device parameters and review of device function. I have personally reviewed the interrogation findings and agree with findings as stated.     In emergency, the nursing units have been notified to contact the perfusion department either by:  Calling [phone] from 630am to 4pm Mon thru Fri, utilizing the On-Call Finder functionality of Epic and searching for Perfusion, or by contacting the hospital  from 4pm to 630am and on weekends and asking to speak with the perfusionist on call.    2:26 PM

## 2023-12-11 NOTE — SUBJECTIVE & OBJECTIVE
Interval History: Uptrending leukocytosis, plan for CT scan today. No fevers documented overnight.   Reports new R sided abdo pain that started this morning near DL.     Review of Systems  Objective:     Vital Signs (Most Recent):  Temp: 97.7 °F (36.5 °C) (12/11/23 0730)  Pulse: 91 (12/11/23 0732)  Resp: 20 (12/11/23 0732)  BP: (!) 86/0 (12/11/23 0300)  SpO2: 98 % (12/11/23 0857) Vital Signs (24h Range):  Temp:  [97.6 °F (36.4 °C)-98.4 °F (36.9 °C)] 97.7 °F (36.5 °C)  Pulse:  [] 91  Resp:  [20] 20  SpO2:  [98 %-99 %] 98 %  BP: (78-88)/(0) 86/0  Arterial Line BP: ()/(55-76) 81/60     Weight: 78 kg (172 lb)  Body mass index is 23.33 kg/m².    Estimated Creatinine Clearance: 106.4 mL/min (based on SCr of 0.8 mg/dL).     Physical Exam  Constitutional:       General: He is not in acute distress.  HENT:      Nose:      Comments: NGT  Eyes:      General:         Right eye: No discharge.         Left eye: No discharge.   Cardiovascular:      Comments: Preveena  Pulmonary:      Effort: Pulmonary effort is normal.   Abdominal:      Comments: Abdominal dressing   Musculoskeletal:      Right lower leg: No edema.      Left lower leg: No edema.   Skin:     General: Skin is warm and dry.      Comments: R picc  RIJ  R groin incision site - no erythema, drainage or induration       Neurological:      Mental Status: He is alert.          Significant Labs:   Microbiology Results (last 7 days)       Procedure Component Value Units Date/Time    Blood culture [[id number]] Collected: 12/10/23 1240    Order Status: Completed Specimen: Blood from Peripheral, Antecubital, Right Updated: 12/11/23 0145     Blood Culture, Routine No Growth to date    Blood culture [[id number]] Collected: 12/10/23 1149    Order Status: Completed Specimen: Blood from Line, Jugular, External Right Updated: 12/11/23 0145     Blood Culture, Routine No Growth to date    Culture, Respiratory [[id number]] Collected: 12/06/23 1531    Order Status: Completed  Specimen: Respiratory from Bronchial Wash Updated: 12/08/23 1103     Respiratory Culture No growth     Gram Stain (Respiratory) Moderate WBC's     Gram Stain (Respiratory) Rare Gram negative rods    Narrative:      Bronchial Wash    AFB Culture & Smear [[id number]] Collected: 12/06/23 1532    Order Status: Completed Specimen: Body Fluid from Lung, Right Updated: 12/07/23 2127     AFB Culture & Smear Culture in progress     AFB CULTURE STAIN No acid fast bacilli seen.    Narrative:      Bronchial Wash    Direct AFB stain [[id number]] Collected: 12/06/23 1532    Order Status: Completed Specimen: Body Fluid from Lung, Right Updated: 12/06/23 2257     Direct Acid Fast No acid fast bacilli seen.    Narrative:      Bronchial Wash    Fungus culture [[id number]] Collected: 12/06/23 1532    Order Status: Sent Specimen: Body Fluid from Lung, Right Updated: 12/06/23 1719    Gram stain [[id number]] Collected: 12/06/23 1532    Order Status: Canceled Specimen: Body Fluid from Lung, Right             Significant Imaging: I have reviewed all pertinent imaging results/findings within the past 24 hours.

## 2023-12-11 NOTE — NURSING
Pt aaox3 while sitting up in bed with spouse at bedside.  LVAD history interrogated with no abnormal events noted.  LVAD parameters WNL. Approximately 45 minutes was spent with the patient providing education. Pt denies any needs at this time. LVAD education ongoing.     Patient educated on below information:    VAD Binder given to patient to fill out. Reviewed with patient the workbook, encouraged patient to work through while learning. Also showed patient their Log Sheets, Educated on taking vital signs everyday and recording the results, recording the Coumadin level and dosages everyday, taking weight and monitoring daily, assisted patient in filling out today's information.  : Reviewed in depth the . Patient was shown the display button, silence button, and battery button and what each button does including silencing alarms, checking battery status, and toggle through LCD screen. Reviewed how to preform self  test and review last 6 alarms. Also reviewed with patient the lights and sounds the controller makes during alarms    Pt verbalized understanding. Encouraged pt to notify nurse if they have any questions, problems or concerns for LVAD coordinator.  Pt verbalized understanding and in agreement of plan. Will follow up with pt soon.    PATIENT IS NOT CHECKED OFF ON ALARMS  CAREGIVER Arlyn (spouse) IS NOT CHECKED OFF ON DRESSING CHANGE

## 2023-12-11 NOTE — PT/OT/SLP PROGRESS
Physical Therapy Missed Treatment Note      Patient Name:  Radha Abbott   MRN:  [id number]  Admitting Diagnosis:  Acute on chronic combined systolic and diastolic CHF, NYHA class 4   Recent Surgery: Procedure(s) (LRB):  CLOSURE, WOUND, STERNUM (N/A)  INSERTION, GRAFT, PERICARDIUM  DRAINAGE, PLEURAL EFFUSION 7 Days Post-Op  Admit Date: 11/9/2023  Length of Stay: 32 days    Patient not seen today due to Nurse/ ENRRIQUE hold (pt on CRRT + multiple pressors + giapreza). Will follow-up for progressive mobility pending continued medical stability and patient participation.    Elizabeth Cee, PT, DPT  12/11/2023  Pager: [phone]

## 2023-12-11 NOTE — ASSESSMENT & PLAN NOTE
- suspect acute tubular injury secondary to hypotension/cardiogenic shock likely compounded by intra-arterial contrast and anemia with urinary sediment with granular casts  - continue with continuous SLED for metabolic clearance and volume management, on citrate to prevent clotting   - Q8H RFPs, ionized calcium and magnesium levels per SLED protocol   - renal diet/tube feeds when not NPO, volume restriction per primary team  - strict I/O's and daily weights  - keep MAP > 65 mmHg  - renally all dose medications to eGFR  - avoid nephrotoxic agents wean feasible (i.e. NSAIDs, intra-arterial contrast, supra-therapeutic vancomycin levels, etc.)

## 2023-12-11 NOTE — SUBJECTIVE & OBJECTIVE
Interval History: Patient POD #11 s/p HM3 LVAD implant. Overnight pressor requirements up. Giapreza added along with levo, vaso, epi. Was complaining of RLQ abdominal pain around driveline. Stat CT c/a/p without acute abnormalities.     Continuous Infusions:   sodium chloride 0.9% 5 mL/hr at 12/11/23 1300    angiotensin II (GIAPREZA) 2,500,000 ng in sodium chloride 0.9% 250 mL infusion 10 ng/kg/min (12/11/23 1300)    calcium gluconate 3 g in dextrose 5 % (D5W) 100 mL infusion 30 mL/hr at 12/11/23 1405    dextrose 10 % in water (D10W)      dextrose-sod citrate-citric ac 200 mL/hr at 12/11/23 1353    EPINEPHrine 0.04 mcg/kg/min (12/11/23 1300)    heparin (porcine) in 5 % dex 1,200 Units/hr (12/11/23 1300)    nitric oxide gas      NORepinephrine bitartrate-D5W 0.08 mcg/kg/min (12/11/23 1300)    vasopressin 0.04 Units/min (12/11/23 1300)     Scheduled Meds:   amiodarone  400 mg Per NG tube Daily    atorvastatin  40 mg Per NG tube Daily    hydrocortisone sodium succinate  100 mg Intravenous Q8H    insulin aspart U-100  7 Units Subcutaneous 6 times per day    meropenem (MERREM) IVPB  2 g Intravenous Q8H    micafungin (MYCAMINE) IVPB  100 mg Intravenous Q24H    midodrine  15 mg Oral TID    pantoprazole  40 mg Intravenous BID    polyethylene glycol  17 g Per NG tube Daily    senna-docusate 8.6-50 mg  1 tablet Per NG tube Daily    warfarin  2 mg Per NG tube Daily     PRN Meds:0.9%  NaCl infusion (for blood administration), acetaminophen, albumin human 5%, albuterol sulfate, bisacodyL, dextrose 10 % in water (D10W), dextrose 10%, dextrose 10%, glucagon (human recombinant), insulin aspart U-100, magnesium hydroxide 400 mg/5 ml, magnesium sulfate IVPB, potassium chloride, potassium chloride, QUEtiapine, Flushing PICC/Midline Protocol **AND** [DISCONTINUED] sodium chloride 0.9% **AND** sodium chloride 0.9%, sodium phosphate 20.01 mmol in dextrose 5 % (D5W) 250 mL IVPB, sodium phosphate 30 mmol in dextrose 5 % (D5W) 250 mL IVPB,  sodium phosphate 39.99 mmol in dextrose 5 % (D5W) 250 mL IVPB, Pharmacy to dose Vancomycin consult **AND** vancomycin - pharmacy to dose    Review of patient's allergies indicates:  No Known Allergies  Objective:     Vital Signs (Most Recent):  Temp: 98.2 °F (36.8 °C) (12/11/23 1130)  Pulse: 90 (12/11/23 1319)  Resp: 17 (12/11/23 1319)  BP: (!) 86/0 (12/11/23 0300)  SpO2: 98 % (12/11/23 0857) Vital Signs (24h Range):  Temp:  [97.6 °F (36.4 °C)-98.4 °F (36.9 °C)] 98.2 °F (36.8 °C)  Pulse:  [] 90  Resp:  [17-20] 17  SpO2:  [98 %-99 %] 98 %  BP: (78-88)/(0) 86/0  Arterial Line BP: ()/(53-76) 90/67     Patient Vitals for the past 72 hrs (Last 3 readings):   Weight   12/11/23 0730 78 kg (172 lb)   12/10/23 0509 78 kg (172 lb)       Body mass index is 23.33 kg/m².      Intake/Output Summary (Last 24 hours) at 12/11/2023 1503  Last data filed at 12/11/2023 1344  Gross per 24 hour   Intake 7245.58 ml   Output 5450 ml   Net 1795.58 ml         Hemodynamic Parameters:       Telemetry: NSR        Physical Exam  Constitutional:       Appearance: He is ill-appearing.   HENT:      Head: Normocephalic and atraumatic.   Eyes:      Pupils: Pupils are equal, round, and reactive to light.   Cardiovascular:      Rate and Rhythm: Normal rate and regular rhythm.      Comments: LVAD hum  Pulmonary:      Breath sounds: Rhonchi and rales present.   Abdominal:      General: Abdomen is flat. Bowel sounds are normal. There is no distension.      Palpations: Abdomen is soft.      Tenderness: There is abdominal tenderness.   Musculoskeletal:         General: Normal range of motion.      Cervical back: Neck supple.   Skin:     General: Skin is warm and dry.   Neurological:      Comments: Awake and oriented.             Significant Labs:  CBC:  Recent Labs   Lab 12/09/23  0323 12/10/23  0309 12/10/23  0406 12/11/23  0405 12/11/23  0952   WBC 10.13  --  17.57* 27.70*  --    RBC 2.49*  --  2.35* 2.68*  --    HGB 7.1*  --  6.6* 7.7*  --     HCT 21.2*   < > 21.1* 23.2* 25*     --  253 287  --    MCV 85  --  90 87  --    MCH 28.5  --  28.1 28.7  --    MCHC 33.5  --  31.3* 33.2  --     < > = values in this interval not displayed.       BNP:  Recent Labs   Lab 12/06/23  0410 12/08/23  0408 12/11/23  0405   BNP 1,679* 695* 2,154*       CMP:  Recent Labs   Lab 12/09/23  0323 12/09/23  1145 12/10/23  0406 12/10/23  1127 12/10/23  1342 12/10/23  1715 12/10/23  2204 12/11/23  0405 12/11/23  1151   *   < > 188*  --  161*  161*  --  230* 199*  199*  199*  --    CALCIUM 8.1*   < > 8.5*  --  8.6*  8.6*  --  8.2* 8.7  8.7  8.8  --    ALBUMIN 2.0*   < > 2.1*  --  2.2*  2.2*  --  2.0* 2.1*  2.1*  2.1*  2.1*  --    PROT 6.2  --  6.1  --   --   --   --  6.5  6.5  --       < > 139  --  138  138  --  134* 135*  135*  136  --    K 4.2   < > 4.4   < > 4.5  4.5   < > 3.9 4.2  4.2  4.1 4.2   CO2 24   < > 20*  --  22*  22*  --  18* 20*  20*  20*  --       < > 105  --  104  104  --  103 103  103  103  --    BUN 8   < > 6*  --  5*  5*  --  7* 7*  7*  8  --    CREATININE 0.8   < > 0.7  --  0.7  0.7  --  0.7 0.8  0.8  0.8  --    ALKPHOS 147*  --  145*  --   --   --   --  174*  174*  --    ALT 28  --  26  --   --   --   --  27  27  --    AST 44*  --  55*  --   --   --   --  47*  47*  --    BILITOT 0.7  --  1.0  --   --   --   --  1.3*  1.4*  --     < > = values in this interval not displayed.        Coagulation:   Recent Labs   Lab 12/11/23  0014 12/11/23  0405 12/11/23  0502 12/11/23  1151   INR 1.2 1.2  --  1.2   APTT 36.5*  --  39.6* 42.7*       LDH:  Recent Labs   Lab 12/09/23  0323 12/10/23  0406 12/11/23  0405   * 689* 717*       Microbiology:  Microbiology Results (last 7 days)       Procedure Component Value Units Date/Time    Blood culture [[id number]] Collected: 12/10/23 1240    Order Status: Completed Specimen: Blood from Peripheral, Antecubital, Right Updated: 12/11/23 0145     Blood Culture, Routine No  Growth to date    Blood culture [[id number]] Collected: 12/10/23 1149    Order Status: Completed Specimen: Blood from Line, Jugular, External Right Updated: 12/11/23 0145     Blood Culture, Routine No Growth to date    Culture, Respiratory [[id number]] Collected: 12/06/23 1531    Order Status: Completed Specimen: Respiratory from Bronchial Wash Updated: 12/08/23 1103     Respiratory Culture No growth     Gram Stain (Respiratory) Moderate WBC's     Gram Stain (Respiratory) Rare Gram negative rods    Narrative:      Bronchial Wash    AFB Culture & Smear [[id number]] Collected: 12/06/23 1532    Order Status: Completed Specimen: Body Fluid from Lung, Right Updated: 12/07/23 2127     AFB Culture & Smear Culture in progress     AFB CULTURE STAIN No acid fast bacilli seen.    Narrative:      Bronchial Wash    Direct AFB stain [[id number]] Collected: 12/06/23 1532    Order Status: Completed Specimen: Body Fluid from Lung, Right Updated: 12/06/23 2257     Direct Acid Fast No acid fast bacilli seen.    Narrative:      Bronchial Wash    Fungus culture [[id number]] Collected: 12/06/23 1532    Order Status: Sent Specimen: Body Fluid from Lung, Right Updated: 12/06/23 1719    Gram stain [[id number]] Collected: 12/06/23 1532    Order Status: Canceled Specimen: Body Fluid from Lung, Right             BMP:   Recent Labs   Lab 12/11/23  0405 12/11/23  1151   *  199*  199*  --    *  135*  136  --    K 4.2  4.2  4.1 4.2     103  103  --    CO2 20*  20*  20*  --    BUN 7*  7*  8  --    CREATININE 0.8  0.8  0.8  --    CALCIUM 8.7  8.7  8.8  --    MG 2.0 1.9       I have reviewed all pertinent labs within the past 24 hours.    Estimated Creatinine Clearance: 106.4 mL/min (based on SCr of 0.8 mg/dL).    Diagnostic Results:  I have reviewed all pertinent imaging results/findings within the past 24 hours.

## 2023-12-11 NOTE — PROGRESS NOTES
Pharmacokinetic Assessment Follow Up: IV Vancomycin    Vancomycin Regimen Assessment & Plan  - Vancomycin level drawn with morning labs today resulted as 14.1 mcg/mL, goal trough 10-20 mcg/mL.  - Nephrology following for RRT, did continuous SLED yesterday. Will continue pulse dosing.  - Administer vancomycin 1250 mg IV x1 dose today since level is <20. Draw vancomycin level with morning labs tomorrow. Will re-dose as needed depending on level and RRT plans.    Drug levels (last 3 results):  Recent Labs   Lab Result Units 12/11/23  0502   Vancomycin, Random ug/mL 14.1     Pharmacy will continue to follow and monitor vancomycin.    Please contact pharmacy at extension [phone] for questions regarding this assessment.    Thank you for the consult,   Kobe Pérez     Patient brief summary:  Radha Abbott is a 63 y.o. male initiated on antimicrobial therapy with IV Vancomycin for treatment of bacteremia    Drug Allergies:   Review of patient's allergies indicates:  No Known Allergies    Actual Body Weight:   78 kg    Renal Function:   Estimated Creatinine Clearance: 106.4 mL/min (based on SCr of 0.8 mg/dL).,     Dialysis Method (if applicable):  SLED

## 2023-12-11 NOTE — CARE UPDATE
SICU Staff Addendum  Trialysis line exchanged earlier today. Pt remains on multiple vasoactive medications. ABG with a worsening base deficit. Given this, discussed with Dr. Washington and ID attending Dr. Burt regarding broadening him further since his worsening hypotension is most likely septic in nature. Will order a STAT echo tomorrow as well. In addition discussed with pharmacy regarding initiation of giapreza, for which he meets criteria. Will start giapreza at 40 meet our MAP goals      Deysi Hood MD  Anesthesia Critical Care  Spectra [phone]

## 2023-12-11 NOTE — NURSING
MD Hood notified of pt increasingly appearing ill. Map's 63-67. Order for Levo to increase to 0.08. Micafungin, Meropenem, and Vancomycin ordered. 1 Unit PRBC's ordered and administered to patient.     Gtts: 0.04 Vaso, 0.04 Epi, 0.08 Levo. Giappreza ordered     Stat echo ordered

## 2023-12-11 NOTE — PROGRESS NOTES
Pharmacokinetic Initial Assessment: IV Vancomycin    Assessment/Plan:    Initiate intravenous vancomycin with loading dose of 1500 mg once with subsequent doses when random concentrations are less than 20 mcg/mL.  - Mr. Abbott has ARF on CKD3b. Nephro is following and making recs. Currently on continuous SLED.  - Will pulse dose in the setting of ARF on CKD requiring RRT.  - Desired empiric serum trough concentration is 10 to 20 mcg/mL.  - Draw vancomycin random level on 12/11 at 0500 with AM labs.    Pharmacy will continue to follow and monitor vancomycin.      Please contact pharmacy at extension [phone] with any questions regarding this assessment.     Thank you for the consult,   Carmelina Solomon       Patient brief summary:  Radha Abbott is a 63 y.o. male initiated on antimicrobial therapy with IV Vancomycin for treatment of suspected bacteremia    Actual Body Weight:   78 kg    Renal Function:   Estimated Creatinine Clearance: 121.6 mL/min (based on SCr of 0.7 mg/dL).     Dialysis Method (if applicable):  SLED - currently continuous

## 2023-12-12 PROBLEM — F43.22 ADJUSTMENT DISORDER WITH ANXIETY: Status: ACTIVE | Noted: 2023-12-12

## 2023-12-12 PROBLEM — R45.89 DEPRESSED MOOD: Status: ACTIVE | Noted: 2023-12-12

## 2023-12-12 LAB
ALBUMIN SERPL BCP-MCNC: 2 G/DL (ref 3.5–5.2)
ALBUMIN SERPL BCP-MCNC: 2.1 G/DL (ref 3.5–5.2)
ALLENS TEST: ABNORMAL
ALLENS TEST: NORMAL
ALP SERPL-CCNC: 183 U/L (ref 55–135)
ALT SERPL W/O P-5'-P-CCNC: 25 U/L (ref 10–44)
ANION GAP SERPL CALC-SCNC: 13 MMOL/L (ref 8–16)
ANION GAP SERPL CALC-SCNC: 14 MMOL/L (ref 8–16)
ANION GAP SERPL CALC-SCNC: 15 MMOL/L (ref 8–16)
ANION GAP SERPL CALC-SCNC: 15 MMOL/L (ref 8–16)
ANISOCYTOSIS BLD QL SMEAR: ABNORMAL
APTT PPP: 43.6 SEC (ref 21–32)
APTT PPP: 45.1 SEC (ref 21–32)
APTT PPP: 62 SEC (ref 21–32)
AST SERPL-CCNC: 44 U/L (ref 10–40)
BASO STIPL BLD QL SMEAR: ABNORMAL
BASOPHILS # BLD AUTO: 0.09 K/UL (ref 0–0.2)
BASOPHILS NFR BLD: 0.3 % (ref 0–1.9)
BILIRUB SERPL-MCNC: 1.7 MG/DL (ref 0.1–1)
BIPAP: 0
BLD PROD TYP BPU: NORMAL
BLOOD UNIT EXPIRATION DATE: NORMAL
BLOOD UNIT TYPE CODE: 6200
BLOOD UNIT TYPE: NORMAL
BUN SERPL-MCNC: 6 MG/DL (ref 8–23)
BUN SERPL-MCNC: 7 MG/DL (ref 8–23)
BUN SERPL-MCNC: 8 MG/DL (ref 8–23)
BUN SERPL-MCNC: 8 MG/DL (ref 8–23)
BURR CELLS BLD QL SMEAR: ABNORMAL
C DIFF GDH STL QL: NEGATIVE
C DIFF TOX A+B STL QL IA: NEGATIVE
CA-I BLDV-SCNC: 1 MMOL/L (ref 1.06–1.42)
CA-I BLDV-SCNC: 1.02 MMOL/L (ref 1.06–1.42)
CA-I BLDV-SCNC: 1.09 MMOL/L (ref 1.06–1.42)
CALCIUM SERPL-MCNC: 8.1 MG/DL (ref 8.7–10.5)
CALCIUM SERPL-MCNC: 8.2 MG/DL (ref 8.7–10.5)
CALCIUM SERPL-MCNC: 8.3 MG/DL (ref 8.7–10.5)
CALCIUM SERPL-MCNC: 8.4 MG/DL (ref 8.7–10.5)
CHLORIDE SERPL-SCNC: 103 MMOL/L (ref 95–110)
CHLORIDE SERPL-SCNC: 103 MMOL/L (ref 95–110)
CHLORIDE SERPL-SCNC: 104 MMOL/L (ref 95–110)
CHLORIDE SERPL-SCNC: 105 MMOL/L (ref 95–110)
CO2 SERPL-SCNC: 17 MMOL/L (ref 23–29)
CO2 SERPL-SCNC: 17 MMOL/L (ref 23–29)
CO2 SERPL-SCNC: 18 MMOL/L (ref 23–29)
CO2 SERPL-SCNC: 19 MMOL/L (ref 23–29)
CODING SYSTEM: NORMAL
CREAT SERPL-MCNC: 0.5 MG/DL (ref 0.5–1.4)
CREAT SERPL-MCNC: 0.6 MG/DL (ref 0.5–1.4)
CREAT SERPL-MCNC: 0.7 MG/DL (ref 0.5–1.4)
CREAT SERPL-MCNC: 0.7 MG/DL (ref 0.5–1.4)
CROSSMATCH INTERPRETATION: NORMAL
DELSYS: ABNORMAL
DELSYS: NORMAL
DIFFERENTIAL METHOD BLD: ABNORMAL
DISPENSE STATUS: NORMAL
EOSINOPHIL # BLD AUTO: 0 K/UL (ref 0–0.5)
EOSINOPHIL NFR BLD: 0 % (ref 0–8)
ERYTHROCYTE [DISTWIDTH] IN BLOOD BY AUTOMATED COUNT: 18.8 % (ref 11.5–14.5)
EST. GFR  (NO RACE VARIABLE): >60 ML/MIN/1.73 M^2
FIBRINOGEN PPP-MCNC: 685 MG/DL (ref 182–400)
FIBRINOGEN PPP-MCNC: 746 MG/DL (ref 182–400)
FLOW: 5
GLUCOSE SERPL-MCNC: 178 MG/DL (ref 70–110)
GLUCOSE SERPL-MCNC: 187 MG/DL (ref 70–110)
GLUCOSE SERPL-MCNC: 187 MG/DL (ref 70–110)
GLUCOSE SERPL-MCNC: 199 MG/DL (ref 70–110)
HCO3 UR-SCNC: 17.8 MMOL/L (ref 24–28)
HCO3 UR-SCNC: 18.2 MMOL/L (ref 24–28)
HCO3 UR-SCNC: 19 MMOL/L (ref 12–29)
HCO3 UR-SCNC: 19 MMOL/L (ref 24–28)
HCO3 UR-SCNC: 19.7 MMOL/L (ref 24–28)
HCO3 UR-SCNC: 21.8 MMOL/L (ref 24–28)
HCT VFR BLD AUTO: 21.8 % (ref 40–54)
HCT VFR BLD CALC: 20 %PCV (ref 36–54)
HGB BLD-MCNC: 7.2 G/DL (ref 14–18)
IMM GRANULOCYTES # BLD AUTO: 1.53 K/UL (ref 0–0.04)
IMM GRANULOCYTES NFR BLD AUTO: 4.6 % (ref 0–0.5)
INR PPP: 1.2 (ref 0.8–1.2)
INR PPP: 1.2 (ref 0.8–1.2)
LDH SERPL L TO P-CCNC: 0.42 MMOL/L (ref 0.36–1.25)
LDH SERPL L TO P-CCNC: 0.51 MMOL/L (ref 0.36–1.25)
LDH SERPL L TO P-CCNC: 0.64 MMOL/L (ref 0.36–1.25)
LDH SERPL L TO P-CCNC: 681 U/L (ref 110–260)
LYMPHOCYTES # BLD AUTO: 0.6 K/UL (ref 1–4.8)
LYMPHOCYTES NFR BLD: 1.8 % (ref 18–48)
MAGNESIUM SERPL-MCNC: 1.9 MG/DL (ref 1.6–2.6)
MAGNESIUM SERPL-MCNC: 1.9 MG/DL (ref 1.6–2.6)
MAGNESIUM SERPL-MCNC: 2.1 MG/DL (ref 1.6–2.6)
MCH RBC QN AUTO: 30.1 PG (ref 27–31)
MCHC RBC AUTO-ENTMCNC: 33 G/DL (ref 32–36)
MCV RBC AUTO: 91 FL (ref 82–98)
METHEMOGLOBIN: 1.7 % (ref 0–3)
MODE: ABNORMAL
MODE: NORMAL
MONOCYTES # BLD AUTO: 1.5 K/UL (ref 0.3–1)
MONOCYTES NFR BLD: 4.6 % (ref 4–15)
NEUTROPHILS # BLD AUTO: 29.4 K/UL (ref 1.8–7.7)
NEUTROPHILS NFR BLD: 88.7 % (ref 38–73)
NRBC BLD-RTO: 2 /100 WBC
NUM UNITS TRANS PACKED RBC: NORMAL
PCO2 BLDA: 27.6 MMHG (ref 35–45)
PCO2 BLDA: 28.9 MMHG (ref 35–45)
PCO2 BLDA: 29.3 MMHG (ref 35–45)
PCO2 BLDA: 30.1 MMHG (ref 35–45)
PCO2 BLDA: 40.8 MMHG (ref 27–49)
PCO2 BLDA: 43.2 MMHG (ref 35–45)
PH SMN: 7.28 [PH] (ref 7.25–7.45)
PH SMN: 7.31 [PH] (ref 7.35–7.45)
PH SMN: 7.4 [PH] (ref 7.35–7.45)
PH SMN: 7.4 [PH] (ref 7.35–7.45)
PH SMN: 7.42 [PH] (ref 7.35–7.45)
PH SMN: 7.45 [PH] (ref 7.35–7.45)
PHOSPHATE SERPL-MCNC: 2.1 MG/DL (ref 2.7–4.5)
PHOSPHATE SERPL-MCNC: 2.3 MG/DL (ref 2.7–4.5)
PHOSPHATE SERPL-MCNC: 2.4 MG/DL (ref 2.7–4.5)
PHOSPHATE SERPL-MCNC: 2.9 MG/DL (ref 2.7–4.5)
PLATELET # BLD AUTO: 329 K/UL (ref 150–450)
PLATELET BLD QL SMEAR: ABNORMAL
PMV BLD AUTO: 11.5 FL (ref 9.2–12.9)
PO2 BLDA: 143 MMHG (ref 80–100)
PO2 BLDA: 163 MMHG (ref 80–100)
PO2 BLDA: 172 MMHG (ref 80–100)
PO2 BLDA: 177 MMHG (ref 80–100)
PO2 BLDA: 30 MMHG (ref 17–41)
PO2 BLDA: 34 MMHG (ref 17–41)
PO2 BLDA: 36 MMHG (ref 40–60)
POC BE: -4 MMOL/L
POC BE: -5 MMOL/L
POC BE: -5 MMOL/L
POC BE: -7 MMOL/L
POC BE: -7 MMOL/L
POC BE: -8 MMOL/L
POC IONIZED CALCIUM: 1.03 MMOL/L (ref 1.06–1.42)
POC METHB: 1.7 % (ref 0–3)
POC PERFORMED BY: NORMAL
POC SATURATED O2: 100 % (ref 95–100)
POC SATURATED O2: 100 % (ref 95–100)
POC SATURATED O2: 51 %
POC SATURATED O2: 59 %
POC SATURATED O2: 64 % (ref 95–100)
POC SATURATED O2: 99 % (ref 95–100)
POC SATURATED O2: 99 % (ref 95–100)
POC TCO2: 19 MMOL/L (ref 23–27)
POC TCO2: 19 MMOL/L (ref 23–27)
POC TCO2: 20 MMOL/L (ref 23–27)
POC TCO2: 20 MMOL/L (ref 23–27)
POC TCO2: 21 MMOL/L (ref 23–27)
POC TCO2: 23 MMOL/L (ref 24–29)
POC TEMPERATURE: 37 C
POCT GLUCOSE: 153 MG/DL (ref 70–110)
POCT GLUCOSE: 164 MG/DL (ref 70–110)
POCT GLUCOSE: 178 MG/DL (ref 70–110)
POCT GLUCOSE: 178 MG/DL (ref 70–110)
POCT GLUCOSE: 186 MG/DL (ref 70–110)
POCT GLUCOSE: 186 MG/DL (ref 70–110)
POCT GLUCOSE: 211 MG/DL (ref 70–110)
POIKILOCYTOSIS BLD QL SMEAR: ABNORMAL
POTASSIUM BLD-SCNC: 3.9 MMOL/L (ref 3.5–5.1)
POTASSIUM SERPL-SCNC: 4.2 MMOL/L (ref 3.5–5.1)
POTASSIUM SERPL-SCNC: 4.4 MMOL/L (ref 3.5–5.1)
POTASSIUM SERPL-SCNC: 4.5 MMOL/L (ref 3.5–5.1)
POTASSIUM SERPL-SCNC: 4.5 MMOL/L (ref 3.5–5.1)
PROT SERPL-MCNC: 6.4 G/DL (ref 6–8.4)
PROTHROMBIN TIME: 12.5 SEC (ref 9–12.5)
PROTHROMBIN TIME: 12.6 SEC (ref 9–12.5)
RBC # BLD AUTO: 2.39 M/UL (ref 4.6–6.2)
SAMPLE: ABNORMAL
SAMPLE: NORMAL
SCHISTOCYTES BLD QL SMEAR: ABNORMAL
SCHISTOCYTES BLD QL SMEAR: PRESENT
SITE: ABNORMAL
SITE: NORMAL
SODIUM BLD-SCNC: 137 MMOL/L (ref 136–145)
SODIUM SERPL-SCNC: 135 MMOL/L (ref 136–145)
SODIUM SERPL-SCNC: 136 MMOL/L (ref 136–145)
SP02: 100
SP02: 100
SPECIMEN SOURCE: NORMAL
SPHEROCYTES BLD QL SMEAR: ABNORMAL
TOXIC GRANULES BLD QL SMEAR: PRESENT
VANCOMYCIN SERPL-MCNC: 10.5 UG/ML
WBC # BLD AUTO: 33.19 K/UL (ref 3.9–12.7)

## 2023-12-12 PROCEDURE — 92526 ORAL FUNCTION THERAPY: CPT

## 2023-12-12 PROCEDURE — 99232 SBSQ HOSP IP/OBS MODERATE 35: CPT | Mod: ,,, | Performed by: PSYCHIATRY & NEUROLOGY

## 2023-12-12 PROCEDURE — 25000003 PHARM REV CODE 250: Performed by: THORACIC SURGERY (CARDIOTHORACIC VASCULAR SURGERY)

## 2023-12-12 PROCEDURE — 85025 COMPLETE CBC W/AUTO DIFF WBC: CPT | Performed by: THORACIC SURGERY (CARDIOTHORACIC VASCULAR SURGERY)

## 2023-12-12 PROCEDURE — 25000003 PHARM REV CODE 250

## 2023-12-12 PROCEDURE — 27000646 HC AEROBIKA DEVICE

## 2023-12-12 PROCEDURE — 82800 BLOOD PH: CPT

## 2023-12-12 PROCEDURE — 63600367 HC NITRIC OXIDE PER HOUR

## 2023-12-12 PROCEDURE — 80202 ASSAY OF VANCOMYCIN: CPT | Performed by: THORACIC SURGERY (CARDIOTHORACIC VASCULAR SURGERY)

## 2023-12-12 PROCEDURE — 63600175 PHARM REV CODE 636 W HCPCS

## 2023-12-12 PROCEDURE — 97168 OT RE-EVAL EST PLAN CARE: CPT

## 2023-12-12 PROCEDURE — 85384 FIBRINOGEN ACTIVITY: CPT | Mod: 91 | Performed by: THORACIC SURGERY (CARDIOTHORACIC VASCULAR SURGERY)

## 2023-12-12 PROCEDURE — 85014 HEMATOCRIT: CPT

## 2023-12-12 PROCEDURE — 83050 HGB METHEMOGLOBIN QUAN: CPT

## 2023-12-12 PROCEDURE — 87449 NOS EACH ORGANISM AG IA: CPT | Performed by: STUDENT IN AN ORGANIZED HEALTH CARE EDUCATION/TRAINING PROGRAM

## 2023-12-12 PROCEDURE — 37799 UNLISTED PX VASCULAR SURGERY: CPT

## 2023-12-12 PROCEDURE — 83605 ASSAY OF LACTIC ACID: CPT

## 2023-12-12 PROCEDURE — 84295 ASSAY OF SERUM SODIUM: CPT

## 2023-12-12 PROCEDURE — 99900035 HC TECH TIME PER 15 MIN (STAT)

## 2023-12-12 PROCEDURE — 94799 UNLISTED PULMONARY SVC/PX: CPT

## 2023-12-12 PROCEDURE — 82803 BLOOD GASES ANY COMBINATION: CPT

## 2023-12-12 PROCEDURE — 90945 DIALYSIS ONE EVALUATION: CPT

## 2023-12-12 PROCEDURE — 82330 ASSAY OF CALCIUM: CPT

## 2023-12-12 PROCEDURE — 85610 PROTHROMBIN TIME: CPT | Mod: 91 | Performed by: THORACIC SURGERY (CARDIOTHORACIC VASCULAR SURGERY)

## 2023-12-12 PROCEDURE — 25000003 PHARM REV CODE 250: Performed by: STUDENT IN AN ORGANIZED HEALTH CARE EDUCATION/TRAINING PROGRAM

## 2023-12-12 PROCEDURE — 25000003 PHARM REV CODE 250: Performed by: PHYSICIAN ASSISTANT

## 2023-12-12 PROCEDURE — 97535 SELF CARE MNGMENT TRAINING: CPT

## 2023-12-12 PROCEDURE — 85730 THROMBOPLASTIN TIME PARTIAL: CPT | Mod: 91 | Performed by: INTERNAL MEDICINE

## 2023-12-12 PROCEDURE — 27000248 HC VAD-ADDITIONAL DAY

## 2023-12-12 PROCEDURE — 83735 ASSAY OF MAGNESIUM: CPT | Mod: 91 | Performed by: STUDENT IN AN ORGANIZED HEALTH CARE EDUCATION/TRAINING PROGRAM

## 2023-12-12 PROCEDURE — 84132 ASSAY OF SERUM POTASSIUM: CPT

## 2023-12-12 PROCEDURE — 99292 CRITICAL CARE ADDL 30 MIN: CPT | Mod: FS,,, | Performed by: PHYSICIAN ASSISTANT

## 2023-12-12 PROCEDURE — 27000221 HC OXYGEN, UP TO 24 HOURS

## 2023-12-12 PROCEDURE — 90832 PSYTX W PT 30 MINUTES: CPT | Mod: ,,, | Performed by: STUDENT IN AN ORGANIZED HEALTH CARE EDUCATION/TRAINING PROGRAM

## 2023-12-12 PROCEDURE — 90945 DIALYSIS ONE EVALUATION: CPT | Mod: ,,, | Performed by: INTERNAL MEDICINE

## 2023-12-12 PROCEDURE — 20000000 HC ICU ROOM

## 2023-12-12 PROCEDURE — 25000003 PHARM REV CODE 250: Performed by: INTERNAL MEDICINE

## 2023-12-12 PROCEDURE — 83615 LACTATE (LD) (LDH) ENZYME: CPT | Performed by: STUDENT IN AN ORGANIZED HEALTH CARE EDUCATION/TRAINING PROGRAM

## 2023-12-12 PROCEDURE — 94761 N-INVAS EAR/PLS OXIMETRY MLT: CPT | Mod: XB

## 2023-12-12 PROCEDURE — 85730 THROMBOPLASTIN TIME PARTIAL: CPT | Performed by: THORACIC SURGERY (CARDIOTHORACIC VASCULAR SURGERY)

## 2023-12-12 PROCEDURE — 99291 CRITICAL CARE FIRST HOUR: CPT | Mod: ,,, | Performed by: INTERNAL MEDICINE

## 2023-12-12 PROCEDURE — 80069 RENAL FUNCTION PANEL: CPT | Mod: 91 | Performed by: STUDENT IN AN ORGANIZED HEALTH CARE EDUCATION/TRAINING PROGRAM

## 2023-12-12 PROCEDURE — 97530 THERAPEUTIC ACTIVITIES: CPT

## 2023-12-12 PROCEDURE — 97164 PT RE-EVAL EST PLAN CARE: CPT

## 2023-12-12 PROCEDURE — 83735 ASSAY OF MAGNESIUM: CPT | Performed by: STUDENT IN AN ORGANIZED HEALTH CARE EDUCATION/TRAINING PROGRAM

## 2023-12-12 PROCEDURE — 94664 DEMO&/EVAL PT USE INHALER: CPT

## 2023-12-12 PROCEDURE — 97110 THERAPEUTIC EXERCISES: CPT

## 2023-12-12 PROCEDURE — 93750 INTERROGATION VAD IN PERSON: CPT | Mod: ,,, | Performed by: INTERNAL MEDICINE

## 2023-12-12 PROCEDURE — 99291 CRITICAL CARE FIRST HOUR: CPT | Mod: FS,,, | Performed by: PHYSICIAN ASSISTANT

## 2023-12-12 PROCEDURE — 99232 SBSQ HOSP IP/OBS MODERATE 35: CPT | Mod: ,,, | Performed by: NURSE PRACTITIONER

## 2023-12-12 PROCEDURE — 63600175 PHARM REV CODE 636 W HCPCS: Performed by: STUDENT IN AN ORGANIZED HEALTH CARE EDUCATION/TRAINING PROGRAM

## 2023-12-12 PROCEDURE — 80053 COMPREHEN METABOLIC PANEL: CPT | Performed by: STUDENT IN AN ORGANIZED HEALTH CARE EDUCATION/TRAINING PROGRAM

## 2023-12-12 PROCEDURE — 80100008 HC CRRT DAILY MAINTENANCE

## 2023-12-12 PROCEDURE — 63600175 PHARM REV CODE 636 W HCPCS: Performed by: PHYSICIAN ASSISTANT

## 2023-12-12 PROCEDURE — 63600175 PHARM REV CODE 636 W HCPCS: Performed by: INTERNAL MEDICINE

## 2023-12-12 PROCEDURE — 99291 CRITICAL CARE FIRST HOUR: CPT | Mod: ,,, | Performed by: STUDENT IN AN ORGANIZED HEALTH CARE EDUCATION/TRAINING PROGRAM

## 2023-12-12 PROCEDURE — 63600175 PHARM REV CODE 636 W HCPCS: Performed by: THORACIC SURGERY (CARDIOTHORACIC VASCULAR SURGERY)

## 2023-12-12 PROCEDURE — 82330 ASSAY OF CALCIUM: CPT | Performed by: STUDENT IN AN ORGANIZED HEALTH CARE EDUCATION/TRAINING PROGRAM

## 2023-12-12 PROCEDURE — C9113 INJ PANTOPRAZOLE SODIUM, VIA: HCPCS

## 2023-12-12 PROCEDURE — 84100 ASSAY OF PHOSPHORUS: CPT | Performed by: THORACIC SURGERY (CARDIOTHORACIC VASCULAR SURGERY)

## 2023-12-12 RX ORDER — MAGNESIUM SULFATE HEPTAHYDRATE 40 MG/ML
2 INJECTION, SOLUTION INTRAVENOUS
Status: DISCONTINUED | OUTPATIENT
Start: 2023-12-12 | End: 2023-12-13

## 2023-12-12 RX ORDER — HEPARIN SODIUM,PORCINE/D5W 25000/250
0-40 INTRAVENOUS SOLUTION INTRAVENOUS CONTINUOUS
Status: DISCONTINUED | OUTPATIENT
Start: 2023-12-12 | End: 2023-12-20

## 2023-12-12 RX ORDER — WARFARIN 4 MG/1
4 TABLET ORAL DAILY
Status: DISCONTINUED | OUTPATIENT
Start: 2023-12-12 | End: 2023-12-18

## 2023-12-12 RX ORDER — PANTOPRAZOLE SODIUM 40 MG/10ML
40 INJECTION, POWDER, LYOPHILIZED, FOR SOLUTION INTRAVENOUS DAILY
Status: DISCONTINUED | OUTPATIENT
Start: 2023-12-13 | End: 2023-12-28

## 2023-12-12 RX ADMIN — INSULIN ASPART 4 UNITS: 100 INJECTION, SOLUTION INTRAVENOUS; SUBCUTANEOUS at 12:12

## 2023-12-12 RX ADMIN — SODIUM PHOSPHATE, MONOBASIC, MONOHYDRATE AND SODIUM PHOSPHATE, DIBASIC, ANHYDROUS 30 MMOL: 142; 276 INJECTION, SOLUTION INTRAVENOUS at 07:12

## 2023-12-12 RX ADMIN — VANCOMYCIN HYDROCHLORIDE 1500 MG: 1.5 INJECTION, POWDER, LYOPHILIZED, FOR SOLUTION INTRAVENOUS at 09:12

## 2023-12-12 RX ADMIN — MAGNESIUM SULFATE HEPTAHYDRATE 2 G: 40 INJECTION, SOLUTION INTRAVENOUS at 06:12

## 2023-12-12 RX ADMIN — ATORVASTATIN CALCIUM 40 MG: 10 TABLET, FILM COATED ORAL at 08:12

## 2023-12-12 RX ADMIN — VASOPRESSIN 0.04 UNITS/MIN: 20 INJECTION INTRAVENOUS at 03:12

## 2023-12-12 RX ADMIN — VASOPRESSIN 0.04 UNITS/MIN: 20 INJECTION INTRAVENOUS at 10:12

## 2023-12-12 RX ADMIN — WARFARIN SODIUM 4 MG: 4 TABLET ORAL at 05:12

## 2023-12-12 RX ADMIN — MIDODRINE HYDROCHLORIDE 15 MG: 5 TABLET ORAL at 08:12

## 2023-12-12 RX ADMIN — CALCIUM GLUCONATE: 98 INJECTION, SOLUTION INTRAVENOUS at 08:12

## 2023-12-12 RX ADMIN — CALCIUM GLUCONATE: 98 INJECTION, SOLUTION INTRAVENOUS at 05:12

## 2023-12-12 RX ADMIN — MEROPENEM 2 G: 1 INJECTION INTRAVENOUS at 05:12

## 2023-12-12 RX ADMIN — PANTOPRAZOLE SODIUM 40 MG: 40 INJECTION, POWDER, FOR SOLUTION INTRAVENOUS at 08:12

## 2023-12-12 RX ADMIN — HYDROCORTISONE SODIUM SUCCINATE 100 MG: 100 INJECTION, POWDER, FOR SOLUTION INTRAMUSCULAR; INTRAVENOUS at 05:12

## 2023-12-12 RX ADMIN — Medication 0.04 MCG/KG/MIN: at 03:12

## 2023-12-12 RX ADMIN — CALCIUM GLUCONATE: 98 INJECTION, SOLUTION INTRAVENOUS at 04:12

## 2023-12-12 RX ADMIN — SODIUM PHOSPHATE, MONOBASIC, MONOHYDRATE AND SODIUM PHOSPHATE, DIBASIC, ANHYDROUS 30 MMOL: 142; 276 INJECTION, SOLUTION INTRAVENOUS at 08:12

## 2023-12-12 RX ADMIN — INSULIN ASPART 2 UNITS: 100 INJECTION, SOLUTION INTRAVENOUS; SUBCUTANEOUS at 08:12

## 2023-12-12 RX ADMIN — CALCIUM GLUCONATE: 98 INJECTION, SOLUTION INTRAVENOUS at 01:12

## 2023-12-12 RX ADMIN — AMIODARONE HYDROCHLORIDE 400 MG: 200 TABLET ORAL at 08:12

## 2023-12-12 RX ADMIN — MEROPENEM 2 G: 1 INJECTION INTRAVENOUS at 08:12

## 2023-12-12 RX ADMIN — MICAFUNGIN SODIUM 100 MG: 100 INJECTION, POWDER, LYOPHILIZED, FOR SOLUTION INTRAVENOUS at 06:12

## 2023-12-12 RX ADMIN — DEXTROSE MONOHYDRATE, SODIUM CITRATE, AND CITRIC ACID MONOHYDRATE: 2.45; 2.2; .8 INJECTION, SOLUTION INTRAVENOUS at 02:12

## 2023-12-12 RX ADMIN — INSULIN ASPART 2 UNITS: 100 INJECTION, SOLUTION INTRAVENOUS; SUBCUTANEOUS at 05:12

## 2023-12-12 RX ADMIN — CALCIUM GLUCONATE: 98 INJECTION, SOLUTION INTRAVENOUS at 11:12

## 2023-12-12 RX ADMIN — HEPARIN SODIUM 12 UNITS/KG/HR: 10000 INJECTION, SOLUTION INTRAVENOUS at 09:12

## 2023-12-12 RX ADMIN — CALCIUM GLUCONATE: 98 INJECTION, SOLUTION INTRAVENOUS at 02:12

## 2023-12-12 RX ADMIN — MIDODRINE HYDROCHLORIDE 15 MG: 5 TABLET ORAL at 05:12

## 2023-12-12 NOTE — SUBJECTIVE & OBJECTIVE
Interval History: No fevers documented overnight. Uptrending WBC, also started on stressed dosed steroids yesterday. Doing well this morning, tolerating abx. Cdiff checked, negative.     Review of Systems   Constitutional:  Negative for chills and fever.   Respiratory:  Positive for cough. Negative for shortness of breath.    Gastrointestinal:  Negative for abdominal pain.     Objective:     Vital Signs (Most Recent):  Temp: 98.6 °F (37 °C) (12/12/23 0700)  Pulse: 87 (12/12/23 0900)  Resp: 17 (12/11/23 1319)  BP: (!) 80/0 (12/12/23 0700)  SpO2: 98 % (12/12/23 0800) Vital Signs (24h Range):  Temp:  [97.5 °F (36.4 °C)-98.6 °F (37 °C)] 98.6 °F (37 °C)  Pulse:  [82-96] 87  Resp:  [17-20] 17  SpO2:  [98 %-100 %] 98 %  BP: (80-82)/(0) 80/0  Arterial Line BP: (75-98)/(56-78) 81/63     Weight: 78.5 kg (173 lb)  Body mass index is 23.46 kg/m².    Estimated Creatinine Clearance: 121.6 mL/min (based on SCr of 0.7 mg/dL).     Physical Exam  Constitutional:       General: He is not in acute distress.  HENT:      Right Ear: External ear normal.      Left Ear: External ear normal.      Nose:      Comments: NGT  Eyes:      General:         Right eye: No discharge.         Left eye: No discharge.   Cardiovascular:      Comments: Preveena  Pulmonary:      Effort: Pulmonary effort is normal. No respiratory distress.   Abdominal:      General: There is no distension.      Palpations: Abdomen is soft.      Tenderness: There is no abdominal tenderness.      Comments: Abdominal dressing   Musculoskeletal:      Right lower leg: No edema.      Left lower leg: No edema.   Skin:     General: Skin is warm and dry.      Comments: R picc  RIJ  R groin incision site - no erythema, drainage or induration       Neurological:      Mental Status: He is alert.          Significant Labs:   Microbiology Results (last 7 days)       Procedure Component Value Units Date/Time    Clostridium difficile EIA [[id number]] Collected: 12/12/23 0110    Order  Status: Completed Specimen: Stool Updated: 12/12/23 0418     C. diff Antigen Negative     C difficile Toxins A+B, EIA Negative     Comment: Testing not recommended for children <24 months old.       Blood culture [[id number]] Collected: 12/10/23 1149    Order Status: Completed Specimen: Blood from Line, Jugular, External Right Updated: 12/11/23 2012     Blood Culture, Routine No Growth to date      No Growth to date    Blood culture [[id number]] Collected: 12/10/23 1240    Order Status: Completed Specimen: Blood from Peripheral, Antecubital, Right Updated: 12/11/23 2012     Blood Culture, Routine No Growth to date      No Growth to date    Culture, Respiratory with Gram Stain [[id number]] Collected: 12/11/23 1643    Order Status: Completed Specimen: Respiratory from Sputum, Expectorated Updated: 12/11/23 2002     Gram Stain (Respiratory) <10 epithelial cells per low power field.     Gram Stain (Respiratory) Rare WBC's     Gram Stain (Respiratory) Rare Gram positive cocci    Culture, Respiratory [[id number]] Collected: 12/06/23 1531    Order Status: Completed Specimen: Respiratory from Bronchial Wash Updated: 12/08/23 1103     Respiratory Culture No growth     Gram Stain (Respiratory) Moderate WBC's     Gram Stain (Respiratory) Rare Gram negative rods    Narrative:      Bronchial Wash    AFB Culture & Smear [[id number]] Collected: 12/06/23 1532    Order Status: Completed Specimen: Body Fluid from Lung, Right Updated: 12/07/23 2127     AFB Culture & Smear Culture in progress     AFB CULTURE STAIN No acid fast bacilli seen.    Narrative:      Bronchial Wash    Direct AFB stain [[id number]] Collected: 12/06/23 1532    Order Status: Completed Specimen: Body Fluid from Lung, Right Updated: 12/06/23 2257     Direct Acid Fast No acid fast bacilli seen.    Narrative:      Bronchial Wash    Fungus culture [[id number]] Collected: 12/06/23 1532    Order Status: Sent Specimen: Body Fluid from Lung, Right Updated: 12/06/23 1719     Gram stain [[id number]] Collected: 12/06/23 1532    Order Status: Canceled Specimen: Body Fluid from Lung, Right             Significant Imaging: I have reviewed all pertinent imaging results/findings within the past 24 hours.

## 2023-12-12 NOTE — NURSING
Attempted to see patient for VAD education. In bed, with eyes closed, spouse at bedside. Spouse requests not to wake patient as he has had a busy day and is finally getting some rest. Asked spouse if they completed daily logs, states she thought that was only for at home. Explained that they should start doing it while in the hospital to be in the habit by the time they go home. Verbalized understanding. Will return tomorrow.

## 2023-12-12 NOTE — PT/OT/SLP RE-EVAL
Occupational Therapy   [id number]    Name: Radha Abbott  MRN: [id number]  Admitting Diagnosis:  LVAD (left ventricular assist device) present  Recent Surgery: Procedure(s) (LRB):  CLOSURE, WOUND, STERNUM (N/A)  INSERTION, GRAFT, PERICARDIUM  DRAINAGE, PLEURAL EFFUSION 8 Days Post-Op    Recommendations:     Discharge Recommendations: High Intensity Therapy  Discharge Equipment Recommendations:  (TBD closer to d/c)  Barriers to discharge:  None    Assessment:     Radha Abbott is a 63 y.o. male with a medical diagnosis of LVAD (left ventricular assist device) present.  He presents s/p LVAD 12/1. Pt alert and with VSS while CRRT running via IJ trialysis. Pt on low dose pressors and RN/MD approved EOB activity. Pt tolerated EOB x 10 min without incident, and pt motivated without complaints.  Performance deficits affecting function are weakness, impaired self care skills, impaired balance, decreased coordination, decreased safety awareness, impaired endurance, impaired functional mobility, decreased upper extremity function, pain, gait instability, decreased lower extremity function, impaired fine motor, impaired cardiopulmonary response to activity.  Pt benefits from co-treatment with PT to accommodate pt's activity tolerance and progression with therapy.      Rehab Prognosis:  Good; patient would benefit from acute skilled OT services to address these deficits and reach maximum level of function.       Plan:     Patient to be seen 6 x/week to address the above listed problems via self-care/home management, therapeutic activities, therapeutic exercises, neuromuscular re-education  Plan of Care Expires: 01/02/24  Plan of Care Reviewed with: patient, spouse    Subjective     Chief Complaint: denies  Patient/Family stated goals: to get better and go home  Communicated with: RN prior to session.  Pain/Comfort:  Pain Rating 1: 0/10  Pain Rating Post-Intervention 1: 0/10    Objective:     Communicated with: RN prior to  session.  Patient found supine with: blood pressure cuff, pulse ox (continuous), telemetry, LVAD, oxygen, Trialysis, wound vac, arterial line, NG tube, PICC line upon OT entry to room.    General Precautions: Standard, fall, LVAD, sternal  Orthopedic Precautions: N/A  Braces: N/A  Respiratory Status: Nasal cannula    Occupational Performance:    Bed Mobility:    Patient completed Scooting/Bridging with total assistance and 2 persons  Patient completed Supine to Sit with total assistance and 2 persons  Patient completed Sit to Supine with total assistance and 2 persons    Functional Mobility/Transfers:  Deferred 2* RN approved EOB due to CRRT/pressors  Functional Mobility: NT    Activities of Daily Living:  Grooming: contact guard assistance seated EOB for washing face with UE support  Lower Body Dressing: total assistance      Cognitive/Visual Perceptual:  Pt alert, oriented and following commands    Physical Exam:  FFP with rounded shoulders  B UE AROM WFL  B UE MMT grossly 3/5 except 3-/5 proximally  B UE Sensation intact    AMPAC 6 Click:  AMPAC Total Score: 9    Treatment & Education:  Pt ed on OT POC  Pt ed on LVAD sternal precautions and use of controller pouch  Tx focused on upright tolerance  Pt sat EOB x 10 min with Mod A progressing to CGA for static sitting  Pt required Max A for sitting during dynamic tasks      Patient left HOB elevated with all lines intact, call button in reach, RN notified, and spouse present    GOALS:   Multidisciplinary Problems       Occupational Therapy Goals          Problem: Occupational Therapy    Goal Priority Disciplines Outcome Interventions   Occupational Therapy Goal     OT, PT/OT Ongoing, Progressing    Description: Goals to be met by: 1/2/24     Patient will increase functional independence with ADLs by performing:    UB Dressing with SBA  LE Dressing with Supervision.  Grooming while standing at sink with Supervision.  Toileting from bedside commode with SBA for  hygiene and clothing management.   Step transfer to bedside commode with SBA  [place] with VAD management during ADLs                             History:     Past Medical History:   Diagnosis Date    CAD (coronary artery disease)     Diabetes mellitus     HFrEF (heart failure with reduced ejection fraction)     ICD (implantable cardioverter-defibrillator) in place     MI, old          Past Surgical History:   Procedure Laterality Date    ANGIOPLASTY-VENOUS ARTERY Right 12/1/2023    Procedure: ANGIOPLASTY-VENOUS ARTERY, RIGHT FEMORAL;  Surgeon: Yuri Washington MD;  Location: [address];  Service: Cardiovascular;  Laterality: Right;    AORTIC VALVULOPLASTY N/A 12/1/2023    Procedure: REPAIR, AORTIC VALVE;  Surgeon: Yuri Washington MD;  [address];  Service: Cardiovascular;  Laterality: N/A;    CARDIAC SURGERY      CLOSURE N/A 12/1/2023    Procedure: CLOSURE, TEMPORARY;  Surgeon: Yuri Washington MD;  [facility];  Service: Cardiovascular;  Laterality: N/A;    DRAINAGE OF PLEURAL EFFUSION  12/4/2023    Procedure: DRAINAGE, PLEURAL EFFUSION;  Surgeon: Yuri Washington MD;  [address];  Service: Cardiovascular;;    INSERTION OF GRAFT TO PERICARDIUM  12/4/2023    Procedure: INSERTION, GRAFT, PERICARDIUM;  Surgeon: Yuri Washington MD;  Location: [address];  Service: Cardiovascular;;    INSERTION OF INTRA-AORTIC BALLOON ASSIST DEVICE Right 11/21/2023    Procedure: INSERTION, INTRA-AORTIC BALLOON PUMP;  Surgeon: Finn Cohn MD;  Location: [facility] CATH LAB;  Service: Cardiology;  Laterality: Right;    LEFT VENTRICULAR ASSIST DEVICE Left 12/1/2023    Procedure: INSERTION-LEFT VENTRICULAR ASSIST DEVICE;  Surgeon: Yuri Washington MD;  Location: [address];  Service: Cardiovascular;  Laterality: Left;  REDO STERNOTOMY - REDO SAW NEEDED FOR CASE    LYSIS OF ADHESIONS  12/1/2023    Procedure: LYSIS, ADHESIONS;  Surgeon: Yuri Washington MD;  Location: [address]  FLR;  Service: Cardiovascular;;    PLACEMENT OF SWAN ROLANDO CATHETER WITH IMAGING GUIDANCE  11/20/2023    Procedure: INSERTION, CATHETER, SWAN-ROLANDO, WITH IMAGING GUIDANCE;  Surgeon: Sajan Hurley MD;  Location: [facility] CATH LAB;  Service: Cardiology;;    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 12/1/2023    Procedure: REPAIR, ANEURYSM, ARTERY, FEMORAL;  Surgeon: Yuri Washington MD;  Location: [facility] OR 2ND FLR;  Service: Cardiovascular;  Laterality: Right;  Right Femoral Artery Repair    RIGHT HEART CATHETERIZATION Right 10/10/2023    Procedure: INSERTION, CATHETER, RIGHT HEART;  Surgeon: Bin Gandhi MD;  Location: [facility] CATH LAB;  Service: Cardiology;  Laterality: Right;    RIGHT HEART CATHETERIZATION Right 10/13/2023    Procedure: INSERTION, CATHETER, RIGHT HEART;  Surgeon: Walter Mcintyre MD;  Location: [facility] CATH LAB;  Service: Cardiology;  Laterality: Right;    RIGHT HEART CATHETERIZATION  11/13/2023    RIGHT HEART CATHETERIZATION Right 11/13/2023    Procedure: INSERTION, CATHETER, RIGHT HEART;  Surgeon: Juventino Bermudez Jr., MD;  Location: [facility] CATH LAB;  Service: Cardiology;  Laterality: Right;    RIGHT HEART CATHETERIZATION Right 11/20/2023    Procedure: INSERTION, CATHETER, RIGHT HEART;  Surgeon: Sajan Hurley MD;  Location: [facility] CATH LAB;  Service: Cardiology;  Laterality: Right;    STERNAL WOUND CLOSURE N/A 12/4/2023    Procedure: CLOSURE, WOUND, STERNUM;  Surgeon: Yuri Washington MD;  Location: [facility] OR 2ND FLR;  Service: Cardiovascular;  Laterality: N/A;    STERNOTOMY N/A 12/1/2023    Procedure: STERNOTOMY, REDO;  Surgeon: Yuri Washington MD;  Location: [facility] OR 2ND FLR;  Service: Cardiovascular;  Laterality: N/A;    VALVULOPLASTY, MITRAL VALVE N/A 12/1/2023    Procedure: VALVULOPLASTY, MITRAL VALVE;  Surgeon: Yuri Washington MD;  Location: [facility] OR 2ND FLR;  Service: Cardiovascular;  Laterality: N/A;       Time Tracking:     OT Date of Treatment: 12/12/23  OT Start Time: 1109  OT  Stop Time: 1139  OT Total Time (min): 30 min    Billable Minutes:Re-eval 7  Self Care/Home Management 8  Therapeutic Activity 15    12/12/2023

## 2023-12-12 NOTE — ASSESSMENT & PLAN NOTE
-S/p 3vCABG in 2009  - home ASA, HI statin- on hold after surgery    no chest pain, no cough, and no shortness of breath.

## 2023-12-12 NOTE — PROGRESS NOTES
[facility] - Surgical Intensive Care  Heart Transplant  Progress Note    Patient Name: Radha Abbott  MRN: [id number]  Admission Date: 11/9/2023  Hospital Length of Stay: 33 days  Attending Physician: Sandhya Price MD  Primary Care Provider: Vasu Kong MD  Principal Problem:LVAD (left ventricular assist device) present    Subjective:   Interval History: Patient POD #11 s/p HM3 LVAD implant. HTS primary today. Heavenly weaned off overnight. Remains on epi 0.04, vaso 0.04, and Ketty 5 ppm. CVP 7, SVO2 59%, CO 6.9, CI 3.5, . Will wean off Ketty and then attempt to wean vaso as tolerated. Continue CRRT. WBC elevated to 33. Afebrile, CT c/a/p without source of infection. ID following, continue empiric antibiotics. Will discontinue steroids now that pressor requirements have improved.     Continuous Infusions:   sodium chloride 0.9% 5 mL/hr at 12/12/23 1100    calcium gluconate 3 g in dextrose 5 % (D5W) 100 mL infusion 35 mL/hr at 12/12/23 1130    dextrose 10 % in water (D10W)      dextrose-sod citrate-citric ac 200 mL/hr at 12/12/23 1100    EPINEPHrine 0.04 mcg/kg/min (12/12/23 1100)    heparin (porcine) in D5W 12 Units/kg/hr (12/12/23 1100)    nitric oxide gas      vasopressin 0.04 Units/min (12/12/23 1100)     Scheduled Meds:   amiodarone  400 mg Per NG tube Daily    atorvastatin  40 mg Per NG tube Daily    insulin aspart U-100  7 Units Subcutaneous 6 times per day    meropenem (MERREM) IVPB  2 g Intravenous Q8H    micafungin (MYCAMINE) IVPB  100 mg Intravenous Q24H    midodrine  15 mg Oral TID    [START ON 12/13/2023] pantoprazole  40 mg Intravenous Daily    polyethylene glycol  17 g Per NG tube Daily    senna-docusate 8.6-50 mg  1 tablet Per NG tube Daily    warfarin  4 mg Per NG tube Daily     PRN Meds:acetaminophen, albumin human 5%, albuterol sulfate, bisacodyL, dextrose 10 % in water (D10W), dextrose 10%, dextrose 10%, glucagon (human recombinant), insulin aspart U-100, magnesium hydroxide 400 mg/5 ml,  magnesium sulfate IVPB, potassium chloride, potassium chloride, QUEtiapine, Flushing PICC/Midline Protocol **AND** [DISCONTINUED] sodium chloride 0.9% **AND** sodium chloride 0.9%, sodium phosphate 20.01 mmol in dextrose 5 % (D5W) 250 mL IVPB, sodium phosphate 30 mmol in dextrose 5 % (D5W) 250 mL IVPB, sodium phosphate 39.99 mmol in dextrose 5 % (D5W) 250 mL IVPB, Pharmacy to dose Vancomycin consult **AND** vancomycin - pharmacy to dose    Review of patient's allergies indicates:  No Known Allergies  Objective:     Vital Signs (Most Recent):  Temp: 97.6 °F (36.4 °C) (12/12/23 1100)  Pulse: 90 (12/12/23 1137)  Resp: 17 (12/11/23 1319)  BP: (!) 80/0 (12/12/23 0700)  SpO2: 97 % (12/12/23 1137) Vital Signs (24h Range):  Temp:  [97.5 °F (36.4 °C)-98.6 °F (37 °C)] 97.6 °F (36.4 °C)  Pulse:  [82-92] 90  Resp:  [17] 17  SpO2:  [97 %-100 %] 97 %  BP: (80-82)/(0) 80/0  Arterial Line BP: (75-95)/(58-78) 83/63     Patient Vitals for the past 72 hrs (Last 3 readings):   Weight   12/12/23 0500 78.5 kg (173 lb)   12/11/23 0730 78 kg (172 lb)   12/10/23 0509 78 kg (172 lb)       Body mass index is 23.46 kg/m².      Intake/Output Summary (Last 24 hours) at 12/12/2023 1145  Last data filed at 12/12/2023 1100  Gross per 24 hour   Intake 7428.05 ml   Output 6815 ml   Net 613.05 ml         Hemodynamic Parameters:       Telemetry: NSR        Physical Exam  Constitutional:       Appearance: He is ill-appearing.   HENT:      Head: Normocephalic and atraumatic.   Eyes:      Pupils: Pupils are equal, round, and reactive to light.   Cardiovascular:      Rate and Rhythm: Normal rate and regular rhythm.      Comments: LVAD hum  Pulmonary:      Breath sounds: Rhonchi and rales present.   Abdominal:      General: Abdomen is flat. Bowel sounds are normal. There is no distension.      Palpations: Abdomen is soft.      Tenderness: There is abdominal tenderness.   Musculoskeletal:         General: Normal range of motion.      Cervical back: Neck  supple.   Skin:     General: Skin is warm and dry.   Neurological:      Comments: Awake and oriented.             Significant Labs:  CBC:  Recent Labs   Lab 12/10/23  0406 12/11/23  0405 12/11/23  0952 12/11/23  1505 12/12/23  0411   WBC 17.57* 27.70*  --   --  33.19*   RBC 2.35* 2.68*  --   --  2.39*   HGB 6.6* 7.7*  --   --  7.2*   HCT 21.1* 23.2* 25* 23* 21.8*    287  --   --  329   MCV 90 87  --   --  91   MCH 28.1 28.7  --   --  30.1   MCHC 31.3* 33.2  --   --  33.0       BNP:  Recent Labs   Lab 12/06/23  0410 12/08/23  0408 12/11/23  0405   BNP 1,679* 695* 2,154*       CMP:  Recent Labs   Lab 12/10/23  0406 12/10/23  1127 12/11/23  0405 12/11/23  1151 12/11/23  1418 12/11/23  1724 12/11/23  2159 12/12/23  0411   *   < > 199*  199*  199*  --  146*  --  157* 187*  187*   CALCIUM 8.5*   < > 8.7  8.7  8.8  --  8.4*  --  8.1* 8.2*  8.3*   ALBUMIN 2.1*   < > 2.1*  2.1*  2.1*  2.1*  --  2.0*  --  2.0* 2.1*  2.1*   PROT 6.1  --  6.5  6.5  --   --   --   --  6.4      < > 135*  135*  136  --  137  --  134* 136  136   K 4.4   < > 4.2  4.2  4.1   < > 4.5 4.9 4.4 4.5  4.5   CO2 20*   < > 20*  20*  20*  --  24  --  20* 17*  17*      < > 103  103  103  --  102  --  103 104  105   BUN 6*   < > 7*  7*  8  --  10  --  7* 8  7*   CREATININE 0.7   < > 0.8  0.8  0.8  --  0.9  --  0.7 0.7  0.7   ALKPHOS 145*  --  174*  174*  --   --   --   --  183*   ALT 26  --  27  27  --   --   --   --  25   AST 55*  --  47*  47*  --   --   --   --  44*   BILITOT 1.0  --  1.3*  1.4*  --   --   --   --  1.7*    < > = values in this interval not displayed.        Coagulation:   Recent Labs   Lab 12/11/23  1151 12/11/23  1724 12/12/23  0028 12/12/23  0607   INR 1.2 1.3* 1.2 1.2   APTT 42.7* 51.0* 62.0*  --        LDH:  Recent Labs   Lab 12/10/23  0406 12/11/23  0405 12/12/23  0411   * 717* 681*       Microbiology:  Microbiology Results (last 7 days)       Procedure Component Value  Units Date/Time    Culture, Respiratory with Gram Stain [[id number]] Collected: 12/11/23 1643    Order Status: Completed Specimen: Respiratory from Sputum, Expectorated Updated: 12/12/23 1052     Respiratory Culture Insufficient incubation, culture in progress     Gram Stain (Respiratory) <10 epithelial cells per low power field.     Gram Stain (Respiratory) Rare WBC's     Gram Stain (Respiratory) Rare Gram positive cocci    Fungus culture [[id number]] Collected: 12/06/23 1532    Order Status: Completed Specimen: Body Fluid from Lung, Right Updated: 12/12/23 0915     Fungus (Mycology) Culture Culture in progress    Narrative:      Bronchial Wash    Clostridium difficile EIA [[id number]] Collected: 12/12/23 0110    Order Status: Completed Specimen: Stool Updated: 12/12/23 0418     C. diff Antigen Negative     C difficile Toxins A+B, EIA Negative     Comment: Testing not recommended for children <24 months old.       Blood culture [[id number]] Collected: 12/10/23 1149    Order Status: Completed Specimen: Blood from Line, Jugular, External Right Updated: 12/11/23 2012     Blood Culture, Routine No Growth to date      No Growth to date    Blood culture [[id number]] Collected: 12/10/23 1240    Order Status: Completed Specimen: Blood from Peripheral, Antecubital, Right Updated: 12/11/23 2012     Blood Culture, Routine No Growth to date      No Growth to date    Culture, Respiratory [[id number]] Collected: 12/06/23 1531    Order Status: Completed Specimen: Respiratory from Bronchial Wash Updated: 12/08/23 1103     Respiratory Culture No growth     Gram Stain (Respiratory) Moderate WBC's     Gram Stain (Respiratory) Rare Gram negative rods    Narrative:      Bronchial Wash    AFB Culture & Smear [[id number]] Collected: 12/06/23 1532    Order Status: Completed Specimen: Body Fluid from Lung, Right Updated: 12/07/23 2127     AFB Culture & Smear Culture in progress     AFB CULTURE STAIN No acid fast bacilli seen.    Narrative:       Bronchial Wash    Direct AFB stain [[id number]] Collected: 12/06/23 1532    Order Status: Completed Specimen: Body Fluid from Lung, Right Updated: 12/06/23 2257     Direct Acid Fast No acid fast bacilli seen.    Narrative:      Bronchial Wash    Gram stain [[id number]] Collected: 12/06/23 1532    Order Status: Canceled Specimen: Body Fluid from Lung, Right             BMP:   Recent Labs   Lab 12/12/23  0411   *  187*     136   K 4.5  4.5     105   CO2 17*  17*   BUN 8  7*   CREATININE 0.7  0.7   CALCIUM 8.2*  8.3*   MG 1.9       I have reviewed all pertinent labs within the past 24 hours.    Estimated Creatinine Clearance: 121.6 mL/min (based on SCr of 0.7 mg/dL).    Diagnostic Results:  I have reviewed all pertinent imaging results/findings within the past 24 hours.  Assessment and Plan:     63 year old male with hx of CAD s/p 3v CABG (unclear anatomy, 2009), ICM with a recent EF of 15-20% s/p ICD (medtronik 2009), DM2 (a1c 7.7), HTN, HLD, Vfib on amio who presents to the ED with CC of SOB     Pt was recently admitted to [facility] as a transfer.  He was started on a Lasix gtt and did well.  Started on gDMT and discharged home on  5 with plans to follow up in HTS clinic for ongoing transplant evaluation at another facility.  He says that about a few days ago he started to notice LE swelling.  This turned into [place] and orthopnea.  He says he can't walk to the bathroom without getting SOB.  He also complains of weight gain.  He takes torsemide 20mg BID at home and was told to trial additional Lasix which he did without any improvement.  He was rx metolazone but has not been filled.  He came to the ED     In the ED he was AF with stable VS on RA.  CBC showing chronic anemia.  CMP notable for acute on chronic CKD with baseline around 2.1 and Cr now 2.6.  BNP elevated.  Lactate neg.  CXR showing pulmonary edema.  I evaluated the pt at the bedside.  Bedside TTE showing CVP >15.  He was  subsequently admitted to the CCU for diuresis.     He was continued on his home  and was started on a lasix gtt, which he responded well to overnight (net -1700cc. He feels much better this morning as well. Our transplant coordinators have been working on insurance approval and he is now being transferred to [facility] service for transplant evaluation.     * LVAD (left ventricular assist device) present  -HeartMate 3 Implanted  12/1/2023  as DT  -HTS Primary  -Implanted by Dr. Washington  -HOLD Coumadin,  Goal INR 2.0-3.0 . Subtherapeutic today. On Heparin gtt   -Antiplatelets Not on ASA  -LDH is stable overall today. Will continue to monitor daily.  -Speed set at  4900   rpm, LSL 4500 rpm   -Interrogation notable for no events  -Not listed for OHTx, declined for OHTX due to comorbidities         Procedure: Device Interrogation Including analysis of device parameters  Current Settings: Ventricular Assist Device  Review of device function is stable/unstable stable        12/12/2023    11:00 AM 12/12/2023    10:00 AM 12/12/2023     9:00 AM 12/12/2023     8:00 AM 12/12/2023     7:00 AM 12/12/2023     6:00 AM 12/12/2023     5:00 AM   TXP LVAD INTERROGATIONS   Type HeartMate3 HeartMate3 HeartMate3 HeartMate3 HeartMate3 HeartMate3 HeartMate3   Flow 3.9 4 4 4 4 3.9 3.9   Speed 4900 4900 4900 4900 4900 4900 4900   PI 4 4.1 3.9 4 4.5 4.4 4.4   Power ([place]) 3.6 3.4 3.3 3.3 3.4 3.3 3.3   LSL 4500    4500 4500 4500   Pulsatility Intermittent pulse    Intermittent pulse Intermittent pulse Intermittent pulse         Depressed mood  -Psychiatry consulted for depressed mood, SI when left alone  -Recommend sitter when alone (possibly with transition to stepdown). No medications at this time     IVÁN (acute kidney injury)  -Nephrology following  -[place]  -CRRT     Altered mental status  -multifactorial  -likely metabolic encephalopathy +/- icu delirium   -[facility] 12/7 negative for acute process  -Continue RRT for metabolic clearance  -Wean  sedation as able  -EEG done over the weekend no seizures detected, just generalized slowing  -provide frequent re-orientation  -Promote sleep/wake cycle   -Neurology consulted   - Much more awake this AM, continue to encourage and motivate    Acute on chronic combined systolic and diastolic CHF, NYHA class 4  Pt with known ICM with an EF of 25% on home  presented with decompensated HF.      -lRHC 11/14 RA 14, PA 70/35, PCWP 32, CO 4.1, CI 2.1  -Repeat RHC 11/20 RA 20, PA 60/29 (39), PCWP 29, CO 4.6, CI 2.3  -ECHO 11/21 showed EF 15-20%, mild RV dysfunction, mild MR, LVED 6.9   -Home  5   -Home GDMT: Entreso 24/26 BID (on hold 2/2 IVÁN), hydralazin 25 q 8hr, all held prior to LVAD implant  -Home diuretic: Lasix 40 mg BID   -IABP inserted 11/21 and underwent DT HM3 implant 12/1, chest closed 12/4  -LVAD speed increased to 4900 rpm on 12/7   -ECHO 12/11 AV does not open, IVS midline, LVEDD 6.1, with HM3 speed set to 4900 rpm  -Increased pressor requirements with Giapreza, Ketty, epi, and vaso 12/11. Able to wean off giapreza overnight 12/11  -CVP 7, SVO2 59%, CO 6.9, CI 3.5,  on Ketty 5ppm, epi 0.04, and vaso 0.04. Will wean off Ketty and then attempt to wean off vaso if tolerated  -Remains on CRRT        PAF (paroxysmal atrial fibrillation)  Known hx of pAF. In sinus rhythm on admission, but 1 run of AF RVR overnight. He spontaneously converted.  - Continue home amiodarone 400mg qd  - A/c per primary team      Acute renal failure with acute tubular necrosis superimposed on stage 3b chronic kidney disease  IVÁN on CKD2. Baseline Cr of 1.7-2.0.   - Hold ARNi, entresto  -Trend BMPs daily   -SrCr 1.1/BUN 13 today  -SLED/SCUF for volume removal   -Nephrology following   -Midodrine 15 mg TID     Type 2 diabetes mellitus without complication, without long-term current use of insulin  -A1c 7.6, not insulin dependent  - MDSSI  -Currently on Insulin gtt   - Endocrine following, appreciate assistance with blood glucose  management    CAD (coronary artery disease)  -S/p 3vCABG in 2009  - home ASA, HI statin- on hold after surgery     Ventricular tachycardia  Hx VT s/p ICD placement (medtronic 2009)  - Continue home amio 400mg qd      Uninterrupted Critical Care/Counseling Time (not including procedures): 70 minutes      ABBI AdamsC  Heart Transplant  [facility] - Surgical Intensive Care

## 2023-12-12 NOTE — PROGRESS NOTES
12/12/23 0155   Treatment   Treatment Type SLED   Treatment Status Daily equipment check   Dialysis Machine Number [id number]   Dialyzer Time (hours) 12.13   BVP (Liters) 104.7 L   Solutions Labeled and Current  Yes   Access Temporary Cath   Catheter Dressing Intact  Yes   Alarms Engaged Yes   CRRT Comments daily check done

## 2023-12-12 NOTE — SUBJECTIVE & OBJECTIVE
Interval History: Patient POD #12 s/p HM3 LVAD implant. HTS primary today. Heavenly weaned off overnight. Remains on epi 0.04, vaso 0.04, and Ketty 5 ppm. CVP 7, SVO2 59%, CO 6.9, CI 3.5, . Will wean off Ketty and then attempt to wean vaso as tolerated. Continue CRRT. WBC elevated to 33. Afebrile, CT c/a/p without source of infection. ID following, continue empiric antibiotics. Will discontinue steroids now that pressor requirements have improved.     Continuous Infusions:   sodium chloride 0.9% 5 mL/hr at 12/12/23 1100    calcium gluconate 3 g in dextrose 5 % (D5W) 100 mL infusion 35 mL/hr at 12/12/23 1130    dextrose 10 % in water (D10W)      dextrose-sod citrate-citric ac 200 mL/hr at 12/12/23 1100    EPINEPHrine 0.04 mcg/kg/min (12/12/23 1100)    heparin (porcine) in D5W 12 Units/kg/hr (12/12/23 1100)    nitric oxide gas      vasopressin 0.04 Units/min (12/12/23 1100)     Scheduled Meds:   amiodarone  400 mg Per NG tube Daily    atorvastatin  40 mg Per NG tube Daily    insulin aspart U-100  7 Units Subcutaneous 6 times per day    meropenem (MERREM) IVPB  2 g Intravenous Q8H    micafungin (MYCAMINE) IVPB  100 mg Intravenous Q24H    midodrine  15 mg Oral TID    [START ON 12/13/2023] pantoprazole  40 mg Intravenous Daily    polyethylene glycol  17 g Per NG tube Daily    senna-docusate 8.6-50 mg  1 tablet Per NG tube Daily    warfarin  4 mg Per NG tube Daily     PRN Meds:acetaminophen, albumin human 5%, albuterol sulfate, bisacodyL, dextrose 10 % in water (D10W), dextrose 10%, dextrose 10%, glucagon (human recombinant), insulin aspart U-100, magnesium hydroxide 400 mg/5 ml, magnesium sulfate IVPB, potassium chloride, potassium chloride, QUEtiapine, Flushing PICC/Midline Protocol **AND** [DISCONTINUED] sodium chloride 0.9% **AND** sodium chloride 0.9%, sodium phosphate 20.01 mmol in dextrose 5 % (D5W) 250 mL IVPB, sodium phosphate 30 mmol in dextrose 5 % (D5W) 250 mL IVPB, sodium phosphate 39.99 mmol in dextrose 5 %  (D5W) 250 mL IVPB, Pharmacy to dose Vancomycin consult **AND** vancomycin - pharmacy to dose    Review of patient's allergies indicates:  No Known Allergies  Objective:     Vital Signs (Most Recent):  Temp: 97.6 °F (36.4 °C) (12/12/23 1100)  Pulse: 90 (12/12/23 1137)  Resp: 17 (12/11/23 1319)  BP: (!) 80/0 (12/12/23 0700)  SpO2: 97 % (12/12/23 1137) Vital Signs (24h Range):  Temp:  [97.5 °F (36.4 °C)-98.6 °F (37 °C)] 97.6 °F (36.4 °C)  Pulse:  [82-92] 90  Resp:  [17] 17  SpO2:  [97 %-100 %] 97 %  BP: (80-82)/(0) 80/0  Arterial Line BP: (75-95)/(58-78) 83/63     Patient Vitals for the past 72 hrs (Last 3 readings):   Weight   12/12/23 0500 78.5 kg (173 lb)   12/11/23 0730 78 kg (172 lb)   12/10/23 0509 78 kg (172 lb)       Body mass index is 23.46 kg/m².      Intake/Output Summary (Last 24 hours) at 12/12/2023 1145  Last data filed at 12/12/2023 1100  Gross per 24 hour   Intake 7428.05 ml   Output 6815 ml   Net 613.05 ml         Hemodynamic Parameters:       Telemetry: NSR        Physical Exam  Constitutional:       Appearance: He is ill-appearing.   HENT:      Head: Normocephalic and atraumatic.   Eyes:      Pupils: Pupils are equal, round, and reactive to light.   Cardiovascular:      Rate and Rhythm: Normal rate and regular rhythm.      Comments: LVAD hum  Pulmonary:      Breath sounds: Rhonchi and rales present.   Abdominal:      General: Abdomen is flat. Bowel sounds are normal. There is no distension.      Palpations: Abdomen is soft.      Tenderness: There is abdominal tenderness.   Musculoskeletal:         General: Normal range of motion.      Cervical back: Neck supple.   Skin:     General: Skin is warm and dry.   Neurological:      Comments: Awake and oriented.             Significant Labs:  CBC:  Recent Labs   Lab 12/10/23  0406 12/11/23  0405 12/11/23  0952 12/11/23  1505 12/12/23  0411   WBC 17.57* 27.70*  --   --  33.19*   RBC 2.35* 2.68*  --   --  2.39*   HGB 6.6* 7.7*  --   --  7.2*   HCT 21.1* 23.2*  25* 23* 21.8*    287  --   --  329   MCV 90 87  --   --  91   MCH 28.1 28.7  --   --  30.1   MCHC 31.3* 33.2  --   --  33.0       BNP:  Recent Labs   Lab 12/06/23  0410 12/08/23  0408 12/11/23  0405   BNP 1,679* 695* 2,154*       CMP:  Recent Labs   Lab 12/10/23  0406 12/10/23  1127 12/11/23  0405 12/11/23  1151 12/11/23  1418 12/11/23  1724 12/11/23  2159 12/12/23 0411   *   < > 199*  199*  199*  --  146*  --  157* 187*  187*   CALCIUM 8.5*   < > 8.7  8.7  8.8  --  8.4*  --  8.1* 8.2*  8.3*   ALBUMIN 2.1*   < > 2.1*  2.1*  2.1*  2.1*  --  2.0*  --  2.0* 2.1*  2.1*   PROT 6.1  --  6.5  6.5  --   --   --   --  6.4      < > 135*  135*  136  --  137  --  134* 136  136   K 4.4   < > 4.2  4.2  4.1   < > 4.5 4.9 4.4 4.5  4.5   CO2 20*   < > 20*  20*  20*  --  24  --  20* 17*  17*      < > 103  103  103  --  102  --  103 104  105   BUN 6*   < > 7*  7*  8  --  10  --  7* 8  7*   CREATININE 0.7   < > 0.8  0.8  0.8  --  0.9  --  0.7 0.7  0.7   ALKPHOS 145*  --  174*  174*  --   --   --   --  183*   ALT 26  --  27  27  --   --   --   --  25   AST 55*  --  47*  47*  --   --   --   --  44*   BILITOT 1.0  --  1.3*  1.4*  --   --   --   --  1.7*    < > = values in this interval not displayed.        Coagulation:   Recent Labs   Lab 12/11/23  1151 12/11/23  1724 12/12/23  0028 12/12/23  0607   INR 1.2 1.3* 1.2 1.2   APTT 42.7* 51.0* 62.0*  --        LDH:  Recent Labs   Lab 12/10/23  0406 12/11/23  0405 12/12/23  0411   * 717* 681*       Microbiology:  Microbiology Results (last 7 days)       Procedure Component Value Units Date/Time    Culture, Respiratory with Gram Stain [[id number]] Collected: 12/11/23 1643    Order Status: Completed Specimen: Respiratory from Sputum, Expectorated Updated: 12/12/23 1052     Respiratory Culture Insufficient incubation, culture in progress     Gram Stain (Respiratory) <10 epithelial cells per low power field.     Gram Stain  (Respiratory) Rare WBC's     Gram Stain (Respiratory) Rare Gram positive cocci    Fungus culture [[id number]] Collected: 12/06/23 1532    Order Status: Completed Specimen: Body Fluid from Lung, Right Updated: 12/12/23 0915     Fungus (Mycology) Culture Culture in progress    Narrative:      Bronchial Wash    Clostridium difficile EIA [[id number]] Collected: 12/12/23 0110    Order Status: Completed Specimen: Stool Updated: 12/12/23 0418     C. diff Antigen Negative     C difficile Toxins A+B, EIA Negative     Comment: Testing not recommended for children <24 months old.       Blood culture [[id number]] Collected: 12/10/23 1149    Order Status: Completed Specimen: Blood from Line, Jugular, External Right Updated: 12/11/23 2012     Blood Culture, Routine No Growth to date      No Growth to date    Blood culture [[id number]] Collected: 12/10/23 1240    Order Status: Completed Specimen: Blood from Peripheral, Antecubital, Right Updated: 12/11/23 2012     Blood Culture, Routine No Growth to date      No Growth to date    Culture, Respiratory [[id number]] Collected: 12/06/23 1531    Order Status: Completed Specimen: Respiratory from Bronchial Wash Updated: 12/08/23 1103     Respiratory Culture No growth     Gram Stain (Respiratory) Moderate WBC's     Gram Stain (Respiratory) Rare Gram negative rods    Narrative:      Bronchial Wash    AFB Culture & Smear [[id number]] Collected: 12/06/23 1532    Order Status: Completed Specimen: Body Fluid from Lung, Right Updated: 12/07/23 2127     AFB Culture & Smear Culture in progress     AFB CULTURE STAIN No acid fast bacilli seen.    Narrative:      Bronchial Wash    Direct AFB stain [[id number]] Collected: 12/06/23 1532    Order Status: Completed Specimen: Body Fluid from Lung, Right Updated: 12/06/23 2257     Direct Acid Fast No acid fast bacilli seen.    Narrative:      Bronchial Wash    Gram stain [[id number]] Collected: 12/06/23 1532    Order Status: Canceled Specimen: Body Fluid  from Lung, Right             BMP:   Recent Labs   Lab 12/12/23  0411   *  187*     136   K 4.5  4.5     105   CO2 17*  17*   BUN 8  7*   CREATININE 0.7  0.7   CALCIUM 8.2*  8.3*   MG 1.9       I have reviewed all pertinent labs within the past 24 hours.    Estimated Creatinine Clearance: 121.6 mL/min (based on SCr of 0.7 mg/dL).    Diagnostic Results:  I have reviewed all pertinent imaging results/findings within the past 24 hours.

## 2023-12-12 NOTE — ASSESSMENT & PLAN NOTE
Neuro/Psych:   -- Sedation: none; seroquel 50 Q6 PRN for agitation  -- Pain: d/c all narcotics  -- gabapentin 300mg QHS  -- psych consulted              Cardiac:   -- S/P redo sternotomy LVAD with Dr. Washington on 12/01, chest closed 12/4  -- MAP Goal: 70-80  -- Cardene PRN  -- Pressors: epi @ 0.04, vso @ 0.04, Heavenly off.  -- midodrine 15 TID  -- Anti-HTNs: none  -- Rhythm: NSR  -- amiodarone 400mg daily  -- LVAD heartmate III: RPM 4800, flow 3.6  -- Statin: Atorvastatin 40 mg QD  -- No ASA at this time  -- heparin 1,000/hr, PTT goal 45-54  -- coumadin 2mg      Pulmonary:   -- Goal SpO2 >92%  -- supplemental O2 as needed      Renal:  -- Trend BUN/Cr   -- Maintain [place], record strict Is/Os  -- on SLED    Recent Labs   Lab 12/11/23  1418 12/11/23  2159 12/12/23  0411   BUN 10 7* 8  7*   CREATININE 0.9 0.7 0.7  0.7        FEN / GI:   -- Daily CMP, PRN K/Mag/Phos per protocol   -- Replace electrolytes as needed  -- Nutrition: TF at goal 40/hr, having BM  -- Bowel Regimen: Miralax, docusate  -- SLP consult       ID:   -- Afebrile  -- WBC increasing, ID consulted  -- Abx: Vanc, alphonse, micafungin    Recent Labs   Lab 12/10/23  0406 12/11/23  0405 12/12/23  0411   WBC 17.57* 27.70* 33.19*           Heme/Onc:   -- Hgb 8.7 pre-operatively  -- received 7 RBC, 2 FFP, 1 platelet, 10 cry intra-op  -- q4 fibrinogen - keep fibrinogen >300  -- holding ASA, on coumadin       Recent Labs   Lab 12/10/23  0406 12/10/23  1127 12/11/23  0405 12/11/23  0502 12/11/23  1151 12/11/23  1724 12/12/23  0028 12/12/23  0411 12/12/23  0607   HGB 6.6*  --  7.7*  --   --   --   --  7.2*  --      --  287  --   --   --   --  329  --    APTT 30.2   < >  --    < > 42.7* 51.0* 62.0*  --   --    INR 1.1   < > 1.2  --  1.2 1.3* 1.2  --  1.2    < > = values in this interval not displayed.           Endocrine:   -- CTS Goal -140  -- HgbA1c: 7.6  -- Endocrinology consulted for insulin management      PPx:   Feeding:TF at goal  Analgesia/Sedation:  seroquel 50 Q6  Thromboembolic Prevention: SCDs  HOB >30: Yes  Stress Ulcer: pantoprazole   Glucose Control: Yes, insulin management per Endocrinology  Bowel reg: Miralax, docusate  Invasive Lines/Drains/Airway:   OGT  Left radial arterial line    PICC line   LVAD     Dispo/Code Status/Palliative:     - Continue SICU Care    - Full Code

## 2023-12-12 NOTE — ASSESSMENT & PLAN NOTE
62 yo male with LVAD for DT on 12/1, post op complicated by diffuse coagulopathy and RV dysfunction with subsequent chest closure on 12/4. Hospital course complicated by respiratory failure s/p intubation, with bronch on 12/6 notable for RLL mucus plug - cultures no growth to date. Also noted to have increasing leukocytosis over the past 24 hours, suspect initiation of stress dose steroids contributing to rise. CT without acute abscess - though noted to have cortes opacities. ECHO without vegetation and cx data unrevealing so far, resp cx in process.       Recommendations:  -continue empiric broad spectrum antimicrobials - vancomycin pharm to dose, meropenem and micafungin pending further work up/cx  -f/u cx, so far no growth to date   -if cx remain negative for staph tomorrow, favor stopping vancomycin

## 2023-12-12 NOTE — SUBJECTIVE & OBJECTIVE
Interval History/Significant Events: Able to wean Heavenly off overnight. Goal euvolemic with CRRT. Continuing Abx coverage. C Diff negative. Patient asking for ice chips.     Follow-up For: Procedure(s) (LRB):  CLOSURE, WOUND, STERNUM (N/A)  INSERTION, GRAFT, PERICARDIUM  DRAINAGE, PLEURAL EFFUSION    Post-Operative Day: 8 Days Post-Op    Objective:     Vital Signs (Most Recent):  Temp: 98.6 °F (37 °C) (12/12/23 0700)  Pulse: 86 (12/12/23 0715)  Resp: 17 (12/11/23 1319)  BP: (!) 82/0 (12/11/23 1930)  SpO2: 100 % (12/12/23 0000) Vital Signs (24h Range):  Temp:  [97.5 °F (36.4 °C)-98.6 °F (37 °C)] 98.6 °F (37 °C)  Pulse:  [82-96] 86  Resp:  [17-20] 17  SpO2:  [98 %-100 %] 100 %  BP: (82)/(0) 82/0  Arterial Line BP: (75-98)/(53-78) 85/68     Weight: 78.5 kg (173 lb)  Body mass index is 23.46 kg/m².      Intake/Output Summary (Last 24 hours) at 12/12/2023 0742  Last data filed at 12/12/2023 0701  Gross per 24 hour   Intake 6980.37 ml   Output 6649 ml   Net 331.37 ml          Physical Exam  Constitutional:       General: He is not in acute distress.  HENT:      Head: Normocephalic and atraumatic.      Mouth/Throat:      Mouth: Mucous membranes are dry.      Pharynx: Oropharynx is clear.   Eyes:      Extraocular Movements: Extraocular movements intact.      Pupils: Pupils are equal, round, and reactive to light.   Cardiovascular:      Rate and Rhythm: Normal rate.      Pulses: Normal pulses.   Pulmonary:      Effort: Pulmonary effort is normal.   Abdominal:      Palpations: Abdomen is soft.   Musculoskeletal:         General: Normal range of motion.      Cervical back: Normal range of motion.   Skin:     General: Skin is warm and dry.   Neurological:      General: No focal deficit present.      Mental Status: He is alert.            Vents:  Vent Mode: Spont (12/09/23 0843)  Ventilator Initiated: Yes (12/06/23 1233)  Set Rate: 18 BPM (12/09/23 0255)  Vt Set: 450 mL (12/09/23 0255)  Pressure Support: 10 cmH20 (12/09/23  0843)  PEEP/CPAP: 5 cmH20 (12/09/23 0843)  Oxygen Concentration (%): 40 (12/09/23 1000)  Peak Airway Pressure: 16 cmH20 (12/09/23 0843)  Plateau Pressure: 18 cmH20 (12/09/23 0843)  Total Ve: 8.94 L/m (12/09/23 0843)  Negative Inspiratory Force (cm H2O): -25 (12/09/23 1010)  F/VT Ratio<105 (RSBI): (!) 57.47 (12/08/23 1535)    Lines/Drains/Airways       Peripherally Inserted Central Catheter Line  Duration             PICC Triple Lumen 12/05/23 1208 right basilic 6 days              Central Venous Catheter Line  Duration             Trialysis (Dialysis) Catheter 12/10/23 1215 right internal jugular 1 day              Drain  Duration                  NG/OG Tube 12/04/23 1730 Left nostril 7 days              Arterial Line  Duration             Arterial Line 12/06/23 1526 Left Radial 5 days              Line  Duration                  VAD 12/01/23 1015 Left ventricular assist device HeartMate 3 10 days              Peripheral Intravenous Line  Duration                  Peripheral IV - Single Lumen 12/08/23 0620 20 G Left Forearm 4 days                    Significant Labs:    CBC/Anemia Profile:  Recent Labs   Lab 12/11/23  0405 12/11/23  0952 12/11/23  1505 12/12/23  0411   WBC 27.70*  --   --  33.19*   HGB 7.7*  --   --  7.2*   HCT 23.2* 25* 23* 21.8*     --   --  329   MCV 87  --   --  91   RDW 17.2*  --   --  18.8*        Chemistries:  Recent Labs   Lab 12/11/23  0405 12/11/23  1151 12/11/23  1418 12/11/23  1724 12/11/23  2159 12/12/23  0411   *  135*  136  --  137  --  134* 136  136   K 4.2  4.2  4.1   < > 4.5 4.9 4.4 4.5  4.5     103  103  --  102  --  103 104  105   CO2 20*  20*  20*  --  24  --  20* 17*  17*   BUN 7*  7*  8  --  10  --  7* 8  7*   CREATININE 0.8  0.8  0.8  --  0.9  --  0.7 0.7  0.7   CALCIUM 8.7  8.7  8.8  --  8.4*  --  8.1* 8.2*  8.3*   ALBUMIN 2.1*  2.1*  2.1*  2.1*  --  2.0*  --  2.0* 2.1*  2.1*   PROT 6.5  6.5  --   --   --   --  6.4   BILITOT 1.3*   1.4*  --   --   --   --  1.7*   ALKPHOS 174*  174*  --   --   --   --  183*   ALT 27  27  --   --   --   --  25   AST 47*  47*  --   --   --   --  44*   MG 2.0   < > 1.9 1.8 2.0 1.9   PHOS 2.5*  2.5*  2.5*  --  1.4*  --  2.2* 2.4*  2.3*    < > = values in this interval not displayed.       Significant Imaging:  I have reviewed all pertinent imaging results/findings within the past 24 hours.

## 2023-12-12 NOTE — PLAN OF CARE
Post-Acute Therapy Recommendation: high intensity     Re-evaluation completed today. PT goals appropriate.    Please continue Progressive Mobility Protocol as appropriate.    Appropriate transfer level with nursing staff: bed level danni Cee, PT, DPT  2023  Pager: [phone]    Problem: Physical Therapy  Goal: Physical Therapy Goal  Description: Goals to be met by: 23     Patient will increase functional independence with mobility by performin. Sit to stand transfer with modified independence  2. Bed to chair transfer with supervision  3. Gait  x 1500 feet with supervision using physician approved AD with standing erst breaks prn.   4. Lower extremity exercise program x30 reps per handout, with independence  5. Recite 3/3 sternal precautions and remain complaint with precautions throughout session with no verbal cues      Outcome: Ongoing, Progressing

## 2023-12-12 NOTE — PLAN OF CARE
Heart Transplant Care Update Note:    Hemodynamics: (Epi 0.04)  CVP: 9  SVO2: 51  CO: 6.02  CI: 3.01  SVR: 784    Wt: 78.5, BSA: 2, Hb: 7.2     Plan:  No changes made. Plan to repeat Hemodynamics this evening     Discussed with HTS Attending    Yudy Stockton MD  Cardiovascular Disease PGY IV  [facility]

## 2023-12-12 NOTE — CONSULTS
[facility]  Psychology  Consult Note      PROGRESS NOTE - INTERVENTION  Patient Name: Radha Abbott  MRN: [id number]  Date: 12/12/2023  Admission Date: 11/9/2023   Length of Stay: Hospital Day: 34   Attending Physician: Sandhya Price MD    Inpatient consult to Psychology  Consult performed by: Blue Hdz, PhD  Consult ordered by: Ashanti Pete MD      HISTORY OF PRESENTING ILLNESS: 64 yo male with LVAD for DT on 12/1, post op complicated by diffuse coagulopathy and RV dysfunction with subsequent chest closure on 12/4. Hospital course complicated by respiratory failure s/p intubation now extubated.    BRIEF ASSESSMENT  Mood: Occasional brief periods of low mood that may last several hours but never all day. Denied anhedonia and suicidal ideation.  S/I: Patient denied current or past suicidal thoughts. He reported that a few days ago, he told his wife that he wanted to die. Patient explained that he was displeased his wife had left the hospital for the day, and that he made a comment about death as an attempt to convey the seriousness of his frustrations and fear. Wife added that the comment about death was an inappropriate way of seeking attention.  Anxiety: High anxiety related to worries about health and recovery. Endorsed excessive anxiety, uncontrollable worry, anxiety-related muscle tension.   Pain: Rated current pain as 4/10. Denied concerns regarding pain management.  Sleep: Denied concerns.  Appetite: Denied concerns. Currently has NGT.    INTERVENTION  Intervention Type: Cognitive Behavioral Therapy  Session Content: Engaged patient cognitive restructuring exercise focused on worries that occur while wife is out of the room (e.g., She could be in a car accident. Something could happen to me.) Patient was engaged and participated well.    MENTAL STATUS EXAM & OBSERVATIONS  Appearance:  Good grooming and hygiene. Dressed in hospital gown. Appeared stated age.      Orientation:  Oriented x 4.   Speech: Soft volume. Communicated mostly via yes/no head nods.   Thought Processes: Logical and goal-oriented.      Thought Content: Normal. No suicidal or homicidal ideation. No indications of obsessions or delusions.   Mood: Anxious.   Affect: Full range, congruent with mood and session content..   Attention & Concentration: Intact. No deficits noted.   Insight: Good   Judgment: Good.   Behavioral Observations: Cooperative and engaged. Laying with head of bed elevated. Good eye contact. No signs of psychomotor agitation or retardation.     DIAGNOSTIC IMPRESSION & PLAN  Patient Active Problem List   Diagnosis    Ventricular tachycardia    CAD (coronary artery disease)    HFrEF (heart failure with reduced ejection fraction)    Type 2 diabetes mellitus without complication, without long-term current use of insulin    Hypokalemia    Acute renal failure with acute tubular necrosis superimposed on stage 3b chronic kidney disease    PAF (paroxysmal atrial fibrillation)    Ventricular fibrillation    Acute on chronic combined systolic and diastolic CHF, NYHA class 4    Defibrillator discharge    Receiving inotropic medication    Pre-transplant evaluation for heart transplant    Palliative care encounter    Advance care planning    LVAD (left ventricular assist device) present    At high risk for skin breakdown    Altered mental status    Abnormal CXR    Acute encephalopathy    IVÁN (acute kidney injury)    Decompensated heart failure    Septic shock    Depressed mood    Adjustment disorder with anxiety       Impression: 64 yo male with LVAD for DT on 12/1, post op complicated by diffuse coagulopathy and RV dysfunction with subsequent chest closure on 12/4. Hospital course complicated by respiratory failure s/p intubation now extubated. Psychiatry following. Psychology consulted to address suicidal ideation. Patient's wife participated in this visit. Patient denied any current or past suicidal ideation. He  clarified that a previous comment about wishing for death was an attempt to express frustration and phillips additional support from wife. Patient endorsed significant anxiety symptoms as well as some mild low mood. Anxiety symptoms are directly linked to current medical stress. Patient meets criteria for Adjustment Disorder with Anxiety. Social support is good. Overall, coping skills are somewhat limited.    Plan: Psychology will continue to follow.     Recommendations: Patient experiences increased anxiety when wife is out of the room and may benefit from additional reassurance.      Thank you for the opportunity to participate in this patient's care.      Length of Service: 31 minutes    Blue Hdz, Ph.D.  Dept. of Psychiatry  [facility]

## 2023-12-12 NOTE — ASSESSMENT & PLAN NOTE
BG goal 140-180.     - D/C scheduled insulin since plans for TF is to not advance.    - BG checks q4hr while on TF/NPO   - Novolog (aspart) insulin prn for BG excursions [facility] (150/25)   - Hypoglycemia protocol in place    ** Please notify Endocrine for any change and/or advance in diet**  ** Please call Endocrine for any BG related issues **    Discharge Planning:   TBD. Please notify endocrinology prior to discharge.

## 2023-12-12 NOTE — ASSESSMENT & PLAN NOTE
-Psychiatry consulted for depressed mood, SI when left alone  -Recommend sitter when alone (possibly with transition to stepdown). No medications at this time

## 2023-12-12 NOTE — PROGRESS NOTES
[facility] - Surgical Intensive Care  Critical Care - Surgery  Progress Note    Patient Name: Radha Abbott  MRN: [id number]  Admission Date: 11/9/2023  Hospital Length of Stay: 33 days  Code Status: Full Code  Attending Provider: Yuri Washington MD  Primary Care Provider: Vasu Kong MD   Principal Problem: Acute on chronic combined systolic and diastolic CHF, NYHA class 4    Subjective:     Hospital/ICU Course:  No notes on file    Interval History/Significant Events: Able to wean Heavenly off overnight. Goal euvolemic with CRRT. Continuing Abx coverage. C Diff negative. Patient asking for ice chips.     Follow-up For: Procedure(s) (LRB):  CLOSURE, WOUND, STERNUM (N/A)  INSERTION, GRAFT, PERICARDIUM  DRAINAGE, PLEURAL EFFUSION    Post-Operative Day: 8 Days Post-Op    Objective:     Vital Signs (Most Recent):  Temp: 98.6 °F (37 °C) (12/12/23 0700)  Pulse: 86 (12/12/23 0715)  Resp: 17 (12/11/23 1319)  BP: (!) 82/0 (12/11/23 1930)  SpO2: 100 % (12/12/23 0000) Vital Signs (24h Range):  Temp:  [97.5 °F (36.4 °C)-98.6 °F (37 °C)] 98.6 °F (37 °C)  Pulse:  [82-96] 86  Resp:  [17-20] 17  SpO2:  [98 %-100 %] 100 %  BP: (82)/(0) 82/0  Arterial Line BP: (75-98)/(53-78) 85/68     Weight: 78.5 kg (173 lb)  Body mass index is 23.46 kg/m².      Intake/Output Summary (Last 24 hours) at 12/12/2023 0742  Last data filed at 12/12/2023 0701  Gross per 24 hour   Intake 6980.37 ml   Output 6649 ml   Net 331.37 ml          Physical Exam  Constitutional:       General: He is not in acute distress.  HENT:      Head: Normocephalic and atraumatic.      Mouth/Throat:      Mouth: Mucous membranes are dry.      Pharynx: Oropharynx is clear.   Eyes:      Extraocular Movements: Extraocular movements intact.      Pupils: Pupils are equal, round, and reactive to light.   Cardiovascular:      Rate and Rhythm: Normal rate.      Pulses: Normal pulses.   Pulmonary:      Effort: Pulmonary effort is normal.   Abdominal:      Palpations: Abdomen is soft.    Musculoskeletal:         General: Normal range of motion.      Cervical back: Normal range of motion.   Skin:     General: Skin is warm and dry.   Neurological:      General: No focal deficit present.      Mental Status: He is alert.            Vents:  Vent Mode: Spont (12/09/23 0843)  Ventilator Initiated: Yes (12/06/23 1233)  Set Rate: 18 BPM (12/09/23 0255)  Vt Set: 450 mL (12/09/23 0255)  Pressure Support: 10 cmH20 (12/09/23 0843)  PEEP/CPAP: 5 cmH20 (12/09/23 0843)  Oxygen Concentration (%): 40 (12/09/23 1000)  Peak Airway Pressure: 16 cmH20 (12/09/23 0843)  Plateau Pressure: 18 cmH20 (12/09/23 0843)  Total Ve: 8.94 L/m (12/09/23 0843)  Negative Inspiratory Force (cm H2O): -25 (12/09/23 1010)  F/VT Ratio<105 (RSBI): (!) 57.47 (12/08/23 1535)    Lines/Drains/Airways       Peripherally Inserted Central Catheter Line  Duration             PICC Triple Lumen 12/05/23 1208 right basilic 6 days              Central Venous Catheter Line  Duration             Trialysis (Dialysis) Catheter 12/10/23 1215 right internal jugular 1 day              Drain  Duration                  NG/OG Tube 12/04/23 1730 Left nostril 7 days              Arterial Line  Duration             Arterial Line 12/06/23 1526 Left Radial 5 days              Line  Duration                  VAD 12/01/23 1015 Left ventricular assist device HeartMate 3 10 days              Peripheral Intravenous Line  Duration                  Peripheral IV - Single Lumen 12/08/23 0620 20 G Left Forearm 4 days                    Significant Labs:    CBC/Anemia Profile:  Recent Labs   Lab 12/11/23  0405 12/11/23  0952 12/11/23  1505 12/12/23  0411   WBC 27.70*  --   --  33.19*   HGB 7.7*  --   --  7.2*   HCT 23.2* 25* 23* 21.8*     --   --  329   MCV 87  --   --  91   RDW 17.2*  --   --  18.8*        Chemistries:  Recent Labs   Lab 12/11/23  0405 12/11/23  1151 12/11/23  1418 12/11/23  1724 12/11/23  2159 12/12/23  0411   *  135*  136  --  137  --  134*  136  136   K 4.2  4.2  4.1   < > 4.5 4.9 4.4 4.5  4.5     103  103  --  102  --  103 104  105   CO2 20*  20*  20*  --  24  --  20* 17*  17*   BUN 7*  7*  8  --  10  --  7* 8  7*   CREATININE 0.8  0.8  0.8  --  0.9  --  0.7 0.7  0.7   CALCIUM 8.7  8.7  8.8  --  8.4*  --  8.1* 8.2*  8.3*   ALBUMIN 2.1*  2.1*  2.1*  2.1*  --  2.0*  --  2.0* 2.1*  2.1*   PROT 6.5  6.5  --   --   --   --  6.4   BILITOT 1.3*  1.4*  --   --   --   --  1.7*   ALKPHOS 174*  174*  --   --   --   --  183*   ALT 27  27  --   --   --   --  25   AST 47*  47*  --   --   --   --  44*   MG 2.0   < > 1.9 1.8 2.0 1.9   PHOS 2.5*  2.5*  2.5*  --  1.4*  --  2.2* 2.4*  2.3*    < > = values in this interval not displayed.       Significant Imaging:  I have reviewed all pertinent imaging results/findings within the past 24 hours.  Assessment/Plan:     Cardiac/Vascular  * Acute on chronic combined systolic and diastolic CHF, NYHA class 4    Neuro/Psych:   -- Sedation: none; seroquel 50 Q6 PRN for agitation  -- Pain: d/c all narcotics  -- gabapentin 300mg QHS  -- psych consulted              Cardiac:   -- S/P redo sternotomy LVAD with Dr. Washington on 12/01, chest closed 12/4  -- MAP Goal: 70-80  -- Cardene PRN  -- Pressors: epi @ 0.04, vso @ 0.04, Heavenly off.  -- midodrine 15 TID  -- Anti-HTNs: none  -- Rhythm: NSR  -- amiodarone 400mg daily  -- LVAD heartmate III: RPM 4800, flow 3.6  -- Statin: Atorvastatin 40 mg QD  -- No ASA at this time  -- heparin 1,000/hr, PTT goal 45-54  -- coumadin 2mg      Pulmonary:   -- Goal SpO2 >92%  -- supplemental O2 as needed      Renal:  -- Trend BUN/Cr   -- Maintain [place], record strict Is/Os  -- on SLED    Recent Labs   Lab 12/11/23  1418 12/11/23  2159 12/12/23  0411   BUN 10 7* 8  7*   CREATININE 0.9 0.7 0.7  0.7        FEN / GI:   -- Daily CMP, PRN K/Mag/Phos per protocol   -- Replace electrolytes as needed  -- Nutrition: TF at goal 40/hr, having BM  -- Bowel Regimen: Miralax,  docusate  -- SLP consult       ID:   -- Afebrile  -- WBC increasing, ID consulted  -- Abx: Vanc, alphonse, micafungin    Recent Labs   Lab 12/10/23  0406 12/11/23  0405 12/12/23  0411   WBC 17.57* 27.70* 33.19*           Heme/Onc:   -- Hgb 8.7 pre-operatively  -- received 7 RBC, 2 FFP, 1 platelet, 10 cry intra-op  -- q4 fibrinogen - keep fibrinogen >300  -- holding ASA, on coumadin       Recent Labs   Lab 12/10/23  0406 12/10/23  1127 12/11/23  0405 12/11/23  0502 12/11/23  1151 12/11/23  1724 12/12/23  0028 12/12/23  0411 12/12/23  0607   HGB 6.6*  --  7.7*  --   --   --   --  7.2*  --      --  287  --   --   --   --  329  --    APTT 30.2   < >  --    < > 42.7* 51.0* 62.0*  --   --    INR 1.1   < > 1.2  --  1.2 1.3* 1.2  --  1.2    < > = values in this interval not displayed.           Endocrine:   -- CTS Goal -140  -- HgbA1c: 7.6  -- Endocrinology consulted for insulin management      PPx:   Feeding:TF at goal  Analgesia/Sedation: seroquel 50 Q6  Thromboembolic Prevention: SCDs  HOB >30: Yes  Stress Ulcer: pantoprazole   Glucose Control: Yes, insulin management per Endocrinology  Bowel reg: Miralax, docusate  Invasive Lines/Drains/Airway:   OGT  Left radial arterial line    PICC line   LVAD     Dispo/Code Status/Palliative:     - Continue SICU Care    - Full Code         Josr Morales MD  Critical Care - Surgery  [facility] - Surgical Intensive Care

## 2023-12-12 NOTE — ASSESSMENT & PLAN NOTE
Lab Results   Component Value Date    CREATININE 0.6 12/12/2023     Avoid insulin stacking  Titrate insulin slowly

## 2023-12-12 NOTE — ASSESSMENT & PLAN NOTE
IVÁN on CKD2. Baseline Cr of 1.7-2.0.   - Hold ARNi, entresto  -Trend BMPs daily   -SrCr 1.1/BUN 13 today  -SLED/SCUF for volume removal   -Nephrology following   -Midodrine 15 mg TID

## 2023-12-12 NOTE — SUBJECTIVE & OBJECTIVE
"Interval HPI:   Overnight events: No acute events overnight. Patient in room 50100/58971 A. Blood glucose stable. BG at and above goal on current insulin regimen (SSI ). Steroid use- None (Finished hydrocortisone 100 mg q8hr). 8 Days Post-Op  Renal function- Normal   Vasopressors-  None       Endocrine will continue to follow and manage insulin orders inpatient.         Diet NPO Except for: Medication (per NG tube)     Eating:   NPO  Nausea: No  Hypoglycemia and intervention: No  Fever: No  TPN and/or TF: Yes  If yes, type of TF/TPN and rate: Has been off x24 hours due to pressors, but recently resumed at trickle feeds.     BP (!) 80/0 (BP Location: Left arm, Patient Position: Lying)   Pulse 95   Temp 97.6 °F (36.4 °C) (Oral)   Resp 17   Ht 6' (1.829 m)   Wt 78.5 kg (173 lb)   SpO2 97%   BMI 23.46 kg/m²     Labs Reviewed and Include    Recent Labs   Lab 12/12/23  0411 12/12/23  1412   *  187* 178*   CALCIUM 8.2*  8.3* 8.4*   ALBUMIN 2.1*  2.1* 2.1*   PROT 6.4  --      136 135*   K 4.5  4.5 4.4   CO2 17*  17* 19*     105 103   BUN 8  7* 8   CREATININE 0.7  0.7 0.6   ALKPHOS 183*  --    ALT 25  --    AST 44*  --    BILITOT 1.7*  --      Lab Results   Component Value Date    WBC 33.19 (H) 12/12/2023    HGB 7.2 (L) 12/12/2023    HCT 21.8 (L) 12/12/2023    MCV 91 12/12/2023     12/12/2023     No results for input(s): "TSH", "FREET4" in the last 168 hours.  Lab Results   Component Value Date    HGBA1C 7.6 (H) 10/12/2023       Nutritional status:   Body mass index is 23.46 kg/m².  Lab Results   Component Value Date    ALBUMIN 2.1 (L) 12/12/2023    ALBUMIN 2.1 (L) 12/12/2023    ALBUMIN 2.1 (L) 12/12/2023     Lab Results   Component Value Date    PREALBUMIN 12 (L) 12/09/2023    PREALBUMIN 19 (L) 12/01/2023    PREALBUMIN 27 11/15/2023       Estimated Creatinine Clearance: 141.9 mL/min (based on SCr of 0.6 mg/dL).    Accu-Checks  Recent Labs     12/11/23  0011 12/11/23  0404 " 12/11/23  0741 12/11/23  1156 12/11/23  1507 12/11/23  2003 12/11/23  2341 12/12/23  0411 12/12/23  0757 12/12/23  1222   POCTGLUCOSE 206* 194* 168* 162* 147* 153* 186* 186* 178* 211*       Current Medications and/or Treatments Impacting Glycemic Control  Immunotherapy:    Immunosuppressants       None          Steroids:   Hormones (From admission, onward)      Start     Stop Route Frequency Ordered    12/08/23 1700  vasopressin (PITRESSIN) 0.2 Units/mL in dextrose 5 % (D5W) 100 mL infusion         -- IV Continuous 12/08/23 1556          Pressors:    Autonomic Drugs (From admission, onward)      Start     Stop Route Frequency Ordered    12/10/23 1045  EPINEPHrine 5 mg in dextrose 5% 250 mL infusion (premix)         -- IV Continuous 12/10/23 0943    12/09/23 2100  midodrine tablet 15 mg         -- Oral 3 times daily 12/09/23 1839          Hyperglycemia/Diabetes Medications:   Antihyperglycemics (From admission, onward)      Start     Stop Route Frequency Ordered    12/11/23 1027  insulin aspart U-100 pen 0-10 Units         -- SubQ Every 4 hours PRN 12/11/23 0927    12/10/23 1200  insulin aspart U-100 pen 7 Units         -- SubQ 6 times per day 12/10/23 0947

## 2023-12-12 NOTE — PROGRESS NOTES
CTS LVAD Transfer to [facility]: 12/12/23  - Dr. Washington dicussed with Dr. Price  from [facility] prior to transfer, all questions answered.  - Admission Date: 12/1/23  - LVAD Implant Date: 12/1/23  - Speed:    - Upper Speed: 5000   - Lower Speed: 4500    Anticoagulation/Antiplatelet   - Anticoagulation: Coumadin   - Antiplatelet: none  - Heparin: 12u      Drips    - Vasopressors: epi .04, vaso 0.04       - Ketty: 5    Labs  - WBC:17  - INR: 1.2  - Creat: 0.7  - LDH: 689    Wound Care  - Midsternal Incision: prevena               Chest tubes    - All removed    Diet: (copy/paste speech note if available)  -Speech recommendations 12/12-    General Recommendations:  ENT evaluation given persistent dysphonia and swallow dysfunction   Diet recommendations:  NPO, Liquid Diet Level: NPO   Aspiration Precautions: Ice chips sparingly for the purpose of swallowing exercises   General Precautions: Standard,    Communication strategies:  none    Nutrition rec 12/11    Recommendations     1. If renal formula warranted for IVÁN: Recommend Novasource renal 40 ml/hr to provide 1920 kcal, 87 g pro, 688 mL water, FWF per MD (96% EEN and 83% of EPN).                 -As pressor requirements increase, rec'd trickle feeds for best tolerance. Avoid holding TF for residuals less than 500mL unless associated with other s/s of intolerance such as N/V/D, abd pain/distention.                 - consider adding BenePRO BID if CRRT continues (PRO needs will be higher), provides 12g PRO.       2. Alternate TF recs: If new TF formula is medically warranted, recommend Impact Peptide 1.5 at trickle rate and slowly increase to goal rate of 55ml/hr to provide 1980 kcal, 124g PRO, and 1016ml FF, FWF per MD (100% of EEN/EPN).                 - renal friendly formula                 - Higher PRO formula     3. If diarrhea or loose stools occur, consider adding psyllium husk BID to help bulk up stools.      4. RD to monitor wt, tolerance, skin, labs.         Goals: Meet  % of EEN/EPN by RD f/u date  Nutrition Goal Status: goal met  Communication of RD Recs:  (POC)              - Bowel Movement: 12/12    PT/OT: (Copy/paste last PT/OT note)  - not evaluated yet

## 2023-12-12 NOTE — SUBJECTIVE & OBJECTIVE
Interval History: Patient seen and examined this AM. Wife at bedside. CT chest, abdomen and pelvis without source of infection. Afebrile with pulse ranging from 90-80s bpm. Systolic blood pressures ranging from 90-70s mmHg via arterial line (was on epinephrine, Giapreza vasopressin and Levophed overnight however now epinephrine and Giapreza. He is saturating 100% on 5 liters of nasal cannula with no documented UOP in the last 24 hours. Net positive ~300 mL. Calcium ratio 2.075.    Review of patient's allergies indicates:  No Known Allergies  Current Facility-Administered Medications   Medication Frequency    0.9%  NaCl infusion (for blood administration) Q24H PRN    0.9%  NaCl infusion Continuous    acetaminophen tablet 650 mg Q6H PRN    albumin human 5% bottle 25 g PRN    albuterol sulfate nebulizer solution 2.5 mg Q4H PRN    amiodarone tablet 400 mg Daily    angiotensin II (GIAPREZA) 2,500,000 ng in sodium chloride 0.9% 250 mL infusion Continuous    atorvastatin tablet 40 mg Daily    bisacodyL suppository 10 mg Daily PRN    calcium gluconate 3 g in dextrose 5 % (D5W) 100 mL infusion Continuous    dextrose 10 % infusion Continuous PRN    dextrose 10% bolus 125 mL 125 mL PRN    dextrose 10% bolus 250 mL 250 mL PRN    dextrose-sod citrate-citric ac 2.45-2.2 gram- 800 mg/100 mL Soln Continuous    EPINEPHrine 5 mg in dextrose 5% 250 mL infusion (premix) Continuous    glucagon (human recombinant) injection 1 mg PRN    heparin 25,000 units in dextrose 5% 250 mL (100 units/mL) infusion (heparin infusion - NO NOMOGRAM) Continuous    hydrocortisone sodium succinate injection 100 mg Q8H    insulin aspart U-100 pen 0-10 Units Q4H PRN    insulin aspart U-100 pen 7 Units 6 times per day    magnesium hydroxide 400 mg/5 ml suspension 2,400 mg Daily PRN    magnesium sulfate 2g in water 50mL IVPB (premix) PRN    meropenem (MERREM) 2 g in sodium chloride 0.9% 100 mL IVPB Q8H    micafungin 100 mg in sodium chloride 0.9 % 100 mL IVPB  (MB+) Q24H    midodrine tablet 15 mg TID    nitric oxide gas Gas 5 ppm Continuous    NORepinephrine 4 mg in dextrose 5% 250 mL infusion (premix) Continuous    pantoprazole injection 40 mg BID    polyethylene glycol packet 17 g Daily    potassium chloride 20 mEq in 100 mL IVPB (FOR CENTRAL LINE ADMINISTRATION ONLY) PRN    potassium chloride 20 mEq in 100 mL IVPB (FOR CENTRAL LINE ADMINISTRATION ONLY) PRN    QUEtiapine tablet 50 mg Q6H PRN    senna-docusate 8.6-50 mg per tablet 1 tablet Daily    sodium chloride 0.9% flush 10 mL PRN    sodium phosphate 20.01 mmol in dextrose 5 % (D5W) 250 mL IVPB PRN    sodium phosphate 30 mmol in dextrose 5 % (D5W) 250 mL IVPB PRN    sodium phosphate 39.99 mmol in dextrose 5 % (D5W) 250 mL IVPB PRN    vancomycin - pharmacy to dose pharmacy to manage frequency    vasopressin (PITRESSIN) 0.2 Units/mL in dextrose 5 % (D5W) 100 mL infusion Continuous    warfarin (COUMADIN) tablet 2 mg Daily       Objective:     Vital Signs (Most Recent):  Temp: 98.2 °F (36.8 °C) (12/12/23 0300)  Pulse: 86 (12/12/23 0615)  Resp: 17 (12/11/23 1319)  BP: (!) 82/0 (12/11/23 1930)  SpO2: 100 % (12/12/23 0000) Vital Signs (24h Range):  Temp:  [97.5 °F (36.4 °C)-98.2 °F (36.8 °C)] 98.2 °F (36.8 °C)  Pulse:  [82-96] 86  Resp:  [17-20] 17  SpO2:  [98 %-100 %] 100 %  BP: (82)/(0) 82/0  Arterial Line BP: (75-98)/(53-78) 93/70     Weight: 78.5 kg (173 lb) (12/12/23 0500)  Body mass index is 23.46 kg/m².  Body surface area is 2 meters squared.    I/O last 3 completed shifts:  In: 42048.2 [I.V.:2982.4; Blood:350; NG/GT:670; IV Piggyback:7176.8]  Out: 8703 [Other:8703]     Physical Exam  Vitals and nursing note reviewed.   Constitutional:       General: He is sleeping. He is not in acute distress.     Appearance: He is ill-appearing. He is not diaphoretic.      Interventions: Nasal cannula in place.   HENT:      Head: Normocephalic and atraumatic.      Right Ear: External ear normal.      Left Ear: External ear normal.       Nose:      Comments: NG tube to left nostril and nasal cannula in place.     Mouth/Throat:      Mouth: Mucous membranes are moist.      Pharynx: Oropharynx is clear. No oropharyngeal exudate or posterior oropharyngeal erythema.   Eyes:      General: No scleral icterus.        Right eye: No discharge.         Left eye: No discharge.      Extraocular Movements: Extraocular movements intact.      Conjunctiva/sclera: Conjunctivae normal.   Neck:      Comments: Trialysis catheter to left internal jugular vein.   Cardiovascular:      Rate and Rhythm: Normal rate.      Heart sounds: Murmur (humming sound, VAD) heard.      Systolic murmur is present.      No friction rub. No gallop.   Pulmonary:      Breath sounds: Rales present. No wheezing.      Comments: Bibasilar crackles appreciated.   Chest:      Comments: LVAD present.   Abdominal:      General: Bowel sounds are normal. There is no distension.      Palpations: Abdomen is soft.   Musculoskeletal:      Cervical back: Neck supple.      Right lower leg: No edema.      Left lower leg: No edema.   Skin:     General: Skin is warm and dry.      Coloration: Skin is not jaundiced.          Significant Labs:  ABGs:   Recent Labs   Lab 12/11/23  2301   PH 7.423   PCO2 30.1*   HCO3 19.7*   POCSATURATED 100   BE -5*       BMP:   Recent Labs   Lab 12/12/23 0411   *  187*     136   K 4.5  4.5     105   CO2 17*  17*   BUN 8  7*   CREATININE 0.7  0.7   CALCIUM 8.2*  8.3*   MG 1.9       CBC:   Recent Labs   Lab 12/12/23 0411   WBC 33.19*   RBC 2.39*   HGB 7.2*   HCT 21.8*      MCV 91   MCH 30.1   MCHC 33.0       CMP:   Recent Labs   Lab 12/12/23 0411   *  187*   CALCIUM 8.2*  8.3*   ALBUMIN 2.1*  2.1*   PROT 6.4     136   K 4.5  4.5   CO2 17*  17*     105   BUN 8  7*   CREATININE 0.7  0.7   ALKPHOS 183*   ALT 25   AST 44*   BILITOT 1.7*       Coagulation:   Recent Labs   Lab 12/12/23  0028   INR 1.2   APTT 62.0*        LFTs:   Recent Labs   Lab 12/12/23 0411   ALT 25   AST 44*   ALKPHOS 183*   BILITOT 1.7*   PROT 6.4   ALBUMIN 2.1*  2.1*       Microbiology Results (last 7 days)       Procedure Component Value Units Date/Time    Clostridium difficile EIA [[id number]] Collected: 12/12/23 0110    Order Status: Completed Specimen: Stool Updated: 12/12/23 0418     C. diff Antigen Negative     C difficile Toxins A+B, EIA Negative     Comment: Testing not recommended for children <24 months old.       Blood culture [[id number]] Collected: 12/10/23 1149    Order Status: Completed Specimen: Blood from Line, Jugular, External Right Updated: 12/11/23 2012     Blood Culture, Routine No Growth to date      No Growth to date    Blood culture [[id number]] Collected: 12/10/23 1240    Order Status: Completed Specimen: Blood from Peripheral, Antecubital, Right Updated: 12/11/23 2012     Blood Culture, Routine No Growth to date      No Growth to date    Culture, Respiratory with Gram Stain [[id number]] Collected: 12/11/23 1643    Order Status: Completed Specimen: Respiratory from Sputum, Expectorated Updated: 12/11/23 2002     Gram Stain (Respiratory) <10 epithelial cells per low power field.     Gram Stain (Respiratory) Rare WBC's     Gram Stain (Respiratory) Rare Gram positive cocci    Culture, Respiratory [[id number]] Collected: 12/06/23 1531    Order Status: Completed Specimen: Respiratory from Bronchial Wash Updated: 12/08/23 1103     Respiratory Culture No growth     Gram Stain (Respiratory) Moderate WBC's     Gram Stain (Respiratory) Rare Gram negative rods    Narrative:      Bronchial Wash    AFB Culture & Smear [[id number]] Collected: 12/06/23 1532    Order Status: Completed Specimen: Body Fluid from Lung, Right Updated: 12/07/23 2127     AFB Culture & Smear Culture in progress     AFB CULTURE STAIN No acid fast bacilli seen.    Narrative:      Bronchial Wash    Direct AFB stain [[id number]] Collected: 12/06/23 1532    Order Status:  Completed Specimen: Body Fluid from Lung, Right Updated: 12/06/23 0277     Direct Acid Fast No acid fast bacilli seen.    Narrative:      Bronchial Wash    Fungus culture [[id number]] Collected: 12/06/23 1532    Order Status: Sent Specimen: Body Fluid from Lung, Right Updated: 12/06/23 1719    Gram stain [[id number]] Collected: 12/06/23 1532    Order Status: Canceled Specimen: Body Fluid from Lung, Right           Specimen (24h ago, onward)      None          Significant Imaging:  I have reviewed all imagining in the last 24 hours.

## 2023-12-12 NOTE — PROGRESS NOTES
[facility]  Nephrology  Progress Note    Patient Name: Radha Abbott  MRN: [id number]  Admission Date: 11/9/2023  Hospital Length of Stay: 33 days  Attending Provider: Sandhya Price MD   Primary Care Physician: Vasu Kong MD  Principal Problem:LVAD (left ventricular assist device) present    Subjective:     HPI: Mr. Abbott is a 63-year-old man with ischemic cardiomyopathy with most recent TTE showing EF 10 -15% and G3DD s/p [employer] ICM placement on chronic dobutamine infusion, CAD s/p x3 vessel CABG back in 2009, type 2 diabetes mellitus (most recent hemoglobin A1c 7.6%), hypertension, HLD, history of ventricular fibrillation for which he is on amiodarone and CKD IIIb (baseline creatinine ~2-2.2) who was initially admitted on 11/9 with heart failure exacerbation and hypervolemia for which he was managed with inotrope with dobutamine in addition to IV diuresis. He ultimately underwent IABP placed on 11/21 for mechanical hemodynamic support and subsequently underwent LVAD placement on 12/1. His renal function appears to be mostly stable with IVÁN and creatinine in mid 2s to 3s in addition he is still making urine with Lasix infusion however on 12/5 Nephrology consulted given concern for uremia with BUN 72.     Interval History: Patient seen and examined this AM. Wife at bedside. CT chest, abdomen and pelvis without source of infection. Afebrile with pulse ranging from 90-80s bpm. Systolic blood pressures ranging from 90-70s mmHg via arterial line (was on epinephrine, Giapreza vasopressin and Levophed overnight however now epinephrine and Giapreza. He is saturating 100% on 5 liters of nasal cannula with no documented UOP in the last 24 hours. Net positive ~300 mL. Calcium ratio 2.075.    Review of patient's allergies indicates:  No Known Allergies  Current Facility-Administered Medications   Medication Frequency    0.9%  NaCl infusion (for blood administration) Q24H PRN    0.9%  NaCl  infusion Continuous    acetaminophen tablet 650 mg Q6H PRN    albumin human 5% bottle 25 g PRN    albuterol sulfate nebulizer solution 2.5 mg Q4H PRN    amiodarone tablet 400 mg Daily    angiotensin II (GIAPREZA) 2,500,000 ng in sodium chloride 0.9% 250 mL infusion Continuous    atorvastatin tablet 40 mg Daily    bisacodyL suppository 10 mg Daily PRN    calcium gluconate 3 g in dextrose 5 % (D5W) 100 mL infusion Continuous    dextrose 10 % infusion Continuous PRN    dextrose 10% bolus 125 mL 125 mL PRN    dextrose 10% bolus 250 mL 250 mL PRN    dextrose-sod citrate-citric ac 2.45-2.2 gram- 800 mg/100 mL Soln Continuous    EPINEPHrine 5 mg in dextrose 5% 250 mL infusion (premix) Continuous    glucagon (human recombinant) injection 1 mg PRN    heparin 25,000 units in dextrose 5% 250 mL (100 units/mL) infusion (heparin infusion - NO NOMOGRAM) Continuous    hydrocortisone sodium succinate injection 100 mg Q8H    insulin aspart U-100 pen 0-10 Units Q4H PRN    insulin aspart U-100 pen 7 Units 6 times per day    magnesium hydroxide 400 mg/5 ml suspension 2,400 mg Daily PRN    magnesium sulfate 2g in water 50mL IVPB (premix) PRN    meropenem (MERREM) 2 g in sodium chloride 0.9% 100 mL IVPB Q8H    micafungin 100 mg in sodium chloride 0.9 % 100 mL IVPB (MB+) Q24H    midodrine tablet 15 mg TID    nitric oxide gas Gas 5 ppm Continuous    NORepinephrine 4 mg in dextrose 5% 250 mL infusion (premix) Continuous    pantoprazole injection 40 mg BID    polyethylene glycol packet 17 g Daily    potassium chloride 20 mEq in 100 mL IVPB (FOR CENTRAL LINE ADMINISTRATION ONLY) PRN    potassium chloride 20 mEq in 100 mL IVPB (FOR CENTRAL LINE ADMINISTRATION ONLY) PRN    QUEtiapine tablet 50 mg Q6H PRN    senna-docusate 8.6-50 mg per tablet 1 tablet Daily    sodium chloride 0.9% flush 10 mL PRN    sodium phosphate 20.01 mmol in dextrose 5 % (D5W) 250 mL IVPB PRN    sodium phosphate 30 mmol in dextrose 5 % (D5W) 250 mL IVPB PRN    sodium  phosphate 39.99 mmol in dextrose 5 % (D5W) 250 mL IVPB PRN    vancomycin - pharmacy to dose pharmacy to manage frequency    vasopressin (PITRESSIN) 0.2 Units/mL in dextrose 5 % (D5W) 100 mL infusion Continuous    warfarin (COUMADIN) tablet 2 mg Daily       Objective:     Vital Signs (Most Recent):  Temp: 98.2 °F (36.8 °C) (12/12/23 0300)  Pulse: 86 (12/12/23 0615)  Resp: 17 (12/11/23 1319)  BP: (!) 82/0 (12/11/23 1930)  SpO2: 100 % (12/12/23 0000) Vital Signs (24h Range):  Temp:  [97.5 °F (36.4 °C)-98.2 °F (36.8 °C)] 98.2 °F (36.8 °C)  Pulse:  [82-96] 86  Resp:  [17-20] 17  SpO2:  [98 %-100 %] 100 %  BP: (82)/(0) 82/0  Arterial Line BP: (75-98)/(53-78) 93/70     Weight: 78.5 kg (173 lb) (12/12/23 0500)  Body mass index is 23.46 kg/m².  Body surface area is 2 meters squared.    I/O last 3 completed shifts:  In: 65728.2 [I.V.:2982.4; Blood:350; NG/GT:670; IV Piggyback:7176.8]  Out: 8703 [Other:8703]    Physical Exam  Vitals and nursing note reviewed.   Constitutional:       General: He is sleeping. He is not in acute distress.     Appearance: He is ill-appearing. He is not diaphoretic.      Interventions: Nasal cannula in place.   HENT:      Head: Normocephalic and atraumatic.      Right Ear: External ear normal.      Left Ear: External ear normal.      Nose:      Comments: NG tube to left nostril and nasal cannula in place.     Mouth/Throat:      Mouth: Mucous membranes are moist.      Pharynx: Oropharynx is clear. No oropharyngeal exudate or posterior oropharyngeal erythema.   Eyes:      General: No scleral icterus.        Right eye: No discharge.         Left eye: No discharge.      Extraocular Movements: Extraocular movements intact.      Conjunctiva/sclera: Conjunctivae normal.   Neck:      Comments: Trialysis catheter to left internal jugular vein.   Cardiovascular:      Rate and Rhythm: Normal rate.      Heart sounds: Murmur (humming sound, VAD) heard.      Systolic murmur is present.      No friction rub. No  gallop.   Pulmonary:      Breath sounds: Rales present. No wheezing.      Comments: Bibasilar crackles appreciated.   Chest:      Comments: LVAD present.   Abdominal:      General: Bowel sounds are normal. There is no distension.      Palpations: Abdomen is soft.   Musculoskeletal:      Cervical back: Neck supple.      Right lower leg: No edema.      Left lower leg: No edema.   Skin:     General: Skin is warm and dry.      Coloration: Skin is not jaundiced.          Significant Labs:  ABGs:   Recent Labs   Lab 12/11/23  2301   PH 7.423   PCO2 30.1*   HCO3 19.7*   POCSATURATED 100   BE -5*       BMP:   Recent Labs   Lab 12/12/23 0411   *  187*     136   K 4.5  4.5     105   CO2 17*  17*   BUN 8  7*   CREATININE 0.7  0.7   CALCIUM 8.2*  8.3*   MG 1.9       CBC:   Recent Labs   Lab 12/12/23 0411   WBC 33.19*   RBC 2.39*   HGB 7.2*   HCT 21.8*      MCV 91   MCH 30.1   MCHC 33.0       CMP:   Recent Labs   Lab 12/12/23 0411   *  187*   CALCIUM 8.2*  8.3*   ALBUMIN 2.1*  2.1*   PROT 6.4     136   K 4.5  4.5   CO2 17*  17*     105   BUN 8  7*   CREATININE 0.7  0.7   ALKPHOS 183*   ALT 25   AST 44*   BILITOT 1.7*       Coagulation:   Recent Labs   Lab 12/12/23  0028   INR 1.2   APTT 62.0*       LFTs:   Recent Labs   Lab 12/12/23 0411   ALT 25   AST 44*   ALKPHOS 183*   BILITOT 1.7*   PROT 6.4   ALBUMIN 2.1*  2.1*       Microbiology Results (last 7 days)       Procedure Component Value Units Date/Time    Clostridium difficile EIA [[id number]] Collected: 12/12/23 0110    Order Status: Completed Specimen: Stool Updated: 12/12/23 0418     C. diff Antigen Negative     C difficile Toxins A+B, EIA Negative     Comment: Testing not recommended for children <24 months old.       Blood culture [[id number]] Collected: 12/10/23 1149    Order Status: Completed Specimen: Blood from Line, Jugular, External Right Updated: 12/11/23 2012     Blood Culture, Routine No Growth to  date      No Growth to date    Blood culture [[id number]] Collected: 12/10/23 1240    Order Status: Completed Specimen: Blood from Peripheral, Antecubital, Right Updated: 12/11/23 2012     Blood Culture, Routine No Growth to date      No Growth to date    Culture, Respiratory with Gram Stain [[id number]] Collected: 12/11/23 1643    Order Status: Completed Specimen: Respiratory from Sputum, Expectorated Updated: 12/11/23 2002     Gram Stain (Respiratory) <10 epithelial cells per low power field.     Gram Stain (Respiratory) Rare WBC's     Gram Stain (Respiratory) Rare Gram positive cocci    Culture, Respiratory [[id number]] Collected: 12/06/23 1531    Order Status: Completed Specimen: Respiratory from Bronchial Wash Updated: 12/08/23 1103     Respiratory Culture No growth     Gram Stain (Respiratory) Moderate WBC's     Gram Stain (Respiratory) Rare Gram negative rods    Narrative:      Bronchial Wash    AFB Culture & Smear [[id number]] Collected: 12/06/23 1532    Order Status: Completed Specimen: Body Fluid from Lung, Right Updated: 12/07/23 2127     AFB Culture & Smear Culture in progress     AFB CULTURE STAIN No acid fast bacilli seen.    Narrative:      Bronchial Wash    Direct AFB stain [[id number]] Collected: 12/06/23 1532    Order Status: Completed Specimen: Body Fluid from Lung, Right Updated: 12/06/23 2257     Direct Acid Fast No acid fast bacilli seen.    Narrative:      Bronchial Wash    Fungus culture [[id number]] Collected: 12/06/23 1532    Order Status: Sent Specimen: Body Fluid from Lung, Right Updated: 12/06/23 1719    Gram stain [[id number]] Collected: 12/06/23 1532    Order Status: Canceled Specimen: Body Fluid from Lung, Right           Specimen (24h ago, onward)      None          Significant Imaging:  I have reviewed all imagining in the last 24 hours.  Assessment/Plan:     Cardiac/Vascular  * LVAD (left ventricular assist device) present  Pre-transplant evaluation for heart transplant  Acute on  chronic combined systolic and diastolic CHF, NYHA class 4  Patient is identified as having Combined Systolic and Diastolic heart failure that is Acute on chronic. CHF is currently uncontrolled due to JVD, Rales/crackles on pulmonary exam, and Pulmonary edema/pleural effusion on CXR. Latest ECHO performed and demonstrates- Results for orders placed during the hospital encounter of 11/09/23    Echo    Interpretation Summary    Left Ventricle: The left ventricle is moderately dilated. Mildly increased ventricular mass. Normal wall thickness. There is eccentric hypertrophy. Severe global hyperkinesis present. There is severely reduced systolic function with a visually estimated ejection fraction of 10 -15%. Grade III diastolic dysfunction.    Right Ventricle: Normal right ventricular cavity size. Systolic function is normal. Pacemaker lead present in the ventricle.    Left Atrium: Left atrium is severely dilated.    Mitral Valve: There is annular and bileaflet tethering. There is moderate regurgitation with a centrally directed jet.    Tricuspid Valve: There is mild regurgitation with a centrally directed jet.    Pulmonary Artery: The estimated pulmonary artery systolic pressure is 41 mmHg.    IVC/SVC: Normal venous pressure at 3 mmHg.    last BNP reviewed- and noted below   Recent Labs   Lab 12/11/23  0405   BNP 2,154*       - management per primary team  - LVAD for mechanical support and epinephrine for ionotropic support  - UF with SLED    Renal/  Acute renal failure with acute tubular necrosis superimposed on stage 3b chronic kidney disease  - suspect acute tubular injury secondary to hypotension/cardiogenic shock likely compounded by intra-arterial contrast and anemia with urinary sediment with granular casts  - continue with continuous SLED for metabolic clearance and volume management, on citrate to prevent clotting   - Q8H RFPs, ionized calcium and magnesium levels per SLED protocol   - renal diet/tube feeds  when not NPO, volume restriction per primary team  - strict I/O's and daily weights  - keep MAP > 65 mmHg  - renally all dose medications to eGFR  - avoid nephrotoxic agents wean feasible (i.e. NSAIDs, intra-arterial contrast, supra-therapeutic vancomycin levels, etc.)    Thank you for your consult. I will follow-up with patient. Please contact us if you have any additional questions.    Henok Ayon MD  Nephrology  [facility]    ATTENDING PHYSICIAN ATTESTATION  I have personally verified the history and examined the patient. I thoroughly reviewed the demographic, clinical, laboratorial and imaging information available in medical records. I agree with the assessment and recommendations provided by the subspecialty resident who was under my supervision.     DIALYSIS (CRRT) NOTE  Patient evaluated while undergoing Slow Low Efficiency Dialysis (SLED) indicated for IVÁN and hemodynamic instability. No complications, tolerating session with current UFR. Will continue current support.

## 2023-12-12 NOTE — PROGRESS NOTES
[facility]  Infectious Disease  Progress Note    Patient Name: Radha Abbott  MRN: [id number]  Admission Date: 11/9/2023  Length of Stay: 33 days  Attending Physician: Yuri Washington MD  Primary Care Provider: Vasu Kong MD    Isolation Status: No active isolations  Assessment/Plan:      ID  Septic shock  64 yo male with LVAD for DT on 12/1, post op complicated by diffuse coagulopathy and RV dysfunction with subsequent chest closure on 12/4. Hospital course complicated by respiratory failure s/p intubation, with bronch on 12/6 notable for RLL mucus plug - cultures no growth to date. Also noted to have increasing leukocytosis over the past 24 hours, suspect initiation of stress dose steroids contributing to rise. CT without acute abscess - though noted to have cortes opacities. ECHO without vegetation and cx data unrevealing so far, resp cx in process.  Remains on pressor support, though overall improving.       Recommendations:  -continue empiric broad spectrum antimicrobials - vancomycin pharm to dose, meropenem and micafungin pending further work up/cx  -f/u cx, so far no growth to date   -if cx remain negative for staph tomorrow, favor stopping vancomycin              Thank you for your consult. I will follow-up with patient. Please contact us if you have any additional questions. Above d/w primary team.     Critical care time: 35 minutes   I personally spent critical care time on the following: evaluating this patient's organ dysfunction, development of treatment plan, discussing treatment plan with patient or surrogate and bedside caregivers, discussions with critical care service and/or consultants, evaluation of patient's response to treatment, physical examination of patient, ordering and review of treatments interventions, laboratory studies, and radiographic studies, re-evaluation of patient's condition. This critical care time did not overlap with that of any other provider of  the same specialty or involve time for procedures.       Layne Robles MD  Infectious Disease  [facility] - Surgical Intensive Care    Subjective:     Principal Problem:Acute on chronic combined systolic and diastolic CHF, NYHA class 4    HPI:  Mr. Abbott is a 63-year-old man who underwent destination therapy left ventricular assist device placement.  Postoperatively the chest was left open due to diffuse coagulopathy in RV dysfunction.  He did well post-operatively and underwent washout and closure on 12/4. The patient was extubated but had to be reintubated. A BAL was performed: Gram stain with GNB, culture is NGTD. ID is consulted for BAL results. The patient is agitated but quiets down with reassurance. No fever or chills. Ecgo done this am - results pending. Family at bedside.     Interval History: No fevers documented overnight. Uptrending WBC, also started on stressed dosed steroids yesterday. Doing well this morning, tolerating abx. Cdiff checked, negative.     Review of Systems   Constitutional:  Negative for chills and fever.   Respiratory:  Positive for cough. Negative for shortness of breath.    Gastrointestinal:  Negative for abdominal pain.     Objective:     Vital Signs (Most Recent):  Temp: 98.6 °F (37 °C) (12/12/23 0700)  Pulse: 87 (12/12/23 0900)  Resp: 17 (12/11/23 1319)  BP: (!) 80/0 (12/12/23 0700)  SpO2: 98 % (12/12/23 0800) Vital Signs (24h Range):  Temp:  [97.5 °F (36.4 °C)-98.6 °F (37 °C)] 98.6 °F (37 °C)  Pulse:  [82-96] 87  Resp:  [17-20] 17  SpO2:  [98 %-100 %] 98 %  BP: (80-82)/(0) 80/0  Arterial Line BP: (75-98)/(56-78) 81/63     Weight: 78.5 kg (173 lb)  Body mass index is 23.46 kg/m².    Estimated Creatinine Clearance: 121.6 mL/min (based on SCr of 0.7 mg/dL).     Physical Exam  Constitutional:       General: He is not in acute distress.  HENT:      Right Ear: External ear normal.      Left Ear: External ear normal.      Nose:      Comments: NGT  Eyes:      General:         Right  eye: No discharge.         Left eye: No discharge.   Cardiovascular:      Comments: Preveena  Pulmonary:      Effort: Pulmonary effort is normal. No respiratory distress.   Abdominal:      General: There is no distension.      Palpations: Abdomen is soft.      Tenderness: There is no abdominal tenderness.      Comments: Abdominal dressing   Musculoskeletal:      Right lower leg: No edema.      Left lower leg: No edema.   Skin:     General: Skin is warm and dry.      Comments: R picc  RIJ  R groin incision site - no erythema, drainage or induration       Neurological:      Mental Status: He is alert.          Significant Labs:   Microbiology Results (last 7 days)       Procedure Component Value Units Date/Time    Clostridium difficile EIA [[id number]] Collected: 12/12/23 0110    Order Status: Completed Specimen: Stool Updated: 12/12/23 0418     C. diff Antigen Negative     C difficile Toxins A+B, EIA Negative     Comment: Testing not recommended for children <24 months old.       Blood culture [[id number]] Collected: 12/10/23 1149    Order Status: Completed Specimen: Blood from Line, Jugular, External Right Updated: 12/11/23 2012     Blood Culture, Routine No Growth to date      No Growth to date    Blood culture [[id number]] Collected: 12/10/23 1240    Order Status: Completed Specimen: Blood from Peripheral, Antecubital, Right Updated: 12/11/23 2012     Blood Culture, Routine No Growth to date      No Growth to date    Culture, Respiratory with Gram Stain [[id number]] Collected: 12/11/23 1643    Order Status: Completed Specimen: Respiratory from Sputum, Expectorated Updated: 12/11/23 2002     Gram Stain (Respiratory) <10 epithelial cells per low power field.     Gram Stain (Respiratory) Rare WBC's     Gram Stain (Respiratory) Rare Gram positive cocci    Culture, Respiratory [[id number]] Collected: 12/06/23 1531    Order Status: Completed Specimen: Respiratory from Bronchial Wash Updated: 12/08/23 1103      Respiratory Culture No growth     Gram Stain (Respiratory) Moderate WBC's     Gram Stain (Respiratory) Rare Gram negative rods    Narrative:      Bronchial Wash    AFB Culture & Smear [[id number]] Collected: 12/06/23 1532    Order Status: Completed Specimen: Body Fluid from Lung, Right Updated: 12/07/23 2127     AFB Culture & Smear Culture in progress     AFB CULTURE STAIN No acid fast bacilli seen.    Narrative:      Bronchial Wash    Direct AFB stain [[id number]] Collected: 12/06/23 1532    Order Status: Completed Specimen: Body Fluid from Lung, Right Updated: 12/06/23 2257     Direct Acid Fast No acid fast bacilli seen.    Narrative:      Bronchial Wash    Fungus culture [[id number]] Collected: 12/06/23 1532    Order Status: Sent Specimen: Body Fluid from Lung, Right Updated: 12/06/23 1719    Gram stain [[id number]] Collected: 12/06/23 1532    Order Status: Canceled Specimen: Body Fluid from Lung, Right             Significant Imaging: I have reviewed all pertinent imaging results/findings within the past 24 hours.

## 2023-12-12 NOTE — PLAN OF CARE
Problem: Occupational Therapy  Goal: Occupational Therapy Goal  Description: Goals to be met by: 1/2/24     Patient will increase functional independence with ADLs by performing:    UB Dressing with SBA  LE Dressing with Supervision.  Grooming while standing at sink with Supervision.  Toileting from bedside commode with SBA for hygiene and clothing management.   Step transfer to bedside commode with SBA  Anna with VAD management during ADLs        Outcome: Ongoing, Progressing

## 2023-12-12 NOTE — ASSESSMENT & PLAN NOTE
Pt with known ICM with an EF of 25% on home  presented with decompensated HF.      -lRHC 11/14 RA 14, PA 70/35, PCWP 32, CO 4.1, CI 2.1  -Repeat RHC 11/20 RA 20, PA 60/29 (39), PCWP 29, CO 4.6, CI 2.3  -ECHO 11/21 showed EF 15-20%, mild RV dysfunction, mild MR, LVED 6.9   -Home  5   -Home GDMT: Entreso 24/26 BID (on hold 2/2 IVÁN), hydralazin 25 q 8hr, all held prior to LVAD implant  -Home diuretic: Lasix 40 mg BID   -IABP inserted 11/21 and underwent DT HM3 implant 12/1, chest closed 12/4  -LVAD speed increased to 4900 rpm on 12/7   -ECHO 12/11 AV does not open, IVS midline, LVEDD 6.1, with HM3 speed set to 4900 rpm  -Increased pressor requirements with Giapreza, Ketty, epi, and vaso 12/11. Able to wean off giapreza overnight 12/11  -CVP 7, SVO2 59%, CO 6.9, CI 3.5,  on Ketty 5ppm, epi 0.04, and vaso 0.04. Will wean off Ketty and then attempt to wean off vaso if tolerated  -Remains on CRRT

## 2023-12-12 NOTE — PLAN OF CARE
SICU PLAN OF CARE NOTE    Dx: Acute on chronic combined systolic and diastolic CHF, NYHA class 4    Goals of Care: MAP 70-80    Vital Signs (last 12 hours):   Temp:  [97.5 °F (36.4 °C)-98.2 °F (36.8 °C)]   Pulse:  [82-92]   BP: (82)/(0)   SpO2:  [100 %]   Arterial Line BP: (76-94)/(58-78)      Neuro: Confused (intermittently), Follows commands, Moves all extremities    Cardiac: NSR (80s-90s)    Respiratory: 5L NC w/ 5iNO    Gtts: Epinephrine , Vasopressin, and Heparin    Urine Output: Anuric     Dialysis: SLED, UF Rate: 200-500/hr    Diet: NPO, NGT for meds    Heartmate 3 LVAD  Speed: 4900  Flows: 3.8-4.2  PI: 3.3-5.7  Power: 3.2-3.3    Family Support spouse at bedside, participating in care     Labs/Accuchecks: Q 8 RFP. Q 6 aPTT, PT/INR, fibrinogen. Q 4 accuchecks.    Skin: No new breakdown noted. Heel boots in use. Drive line dressing intact, unable to view site.     Shift Events: Giapreza weaned off. C. Diff specimen sent per order.

## 2023-12-12 NOTE — ASSESSMENT & PLAN NOTE
-HeartMate 3 Implanted  12/1/2023  as DT  -HTS Primary  -Implanted by Dr. Washington  -HOLD Coumadin,  Goal INR 2.0-3.0 . Subtherapeutic today. On Heparin gtt   -Antiplatelets Not on ASA  -LDH is stable overall today. Will continue to monitor daily.  -Speed set at  4900   rpm, LSL 4500 rpm   -Interrogation notable for no events  -Not listed for OHTx, declined for OHTX due to comorbidities         Procedure: Device Interrogation Including analysis of device parameters  Current Settings: Ventricular Assist Device  Review of device function is stable/unstable stable        12/12/2023    11:00 AM 12/12/2023    10:00 AM 12/12/2023     9:00 AM 12/12/2023     8:00 AM 12/12/2023     7:00 AM 12/12/2023     6:00 AM 12/12/2023     5:00 AM   TXP LVAD INTERROGATIONS   Type HeartMate3 HeartMate3 HeartMate3 HeartMate3 HeartMate3 HeartMate3 HeartMate3   Flow 3.9 4 4 4 4 3.9 3.9   Speed 4900 4900 4900 4900 4900 4900 4900   PI 4 4.1 3.9 4 4.5 4.4 4.4   Power ([place]) 3.6 3.4 3.3 3.3 3.4 3.3 3.3   LSL 4500    4500 4500 4500   Pulsatility Intermittent pulse    Intermittent pulse Intermittent pulse Intermittent pulse

## 2023-12-12 NOTE — PROGRESS NOTES
[facility]  Endocrinology  Progress Note    Admit Date: 11/9/2023     Reason for Consult: Management of T2DM, Hyperglycemia     Surgical Procedure and Date: n/a    Diabetes diagnosis year: 1998    Home Diabetes Medications:  Metformin (off since October)   Lab Results   Component Value Date    HGBA1C 7.6 (H) 10/12/2023       How often checking glucose at home?  Once daily in the AM    BG readings on regimen: 150-160s  Hypoglycemia on the regimen?  No  Missed doses on regimen?  n/a    Diabetes Complications include:     Hyperglycemia    Complicating diabetes co morbidities:   CAD s/p CABG, HTN, HLD      HPI:   Patient is a 63 y.o. male with a diagnosis of CAD s/p 3v CABG (unclear anatomy, 2009), ICM with a recent EF of 15-20% s/p ICD (medtronik 2009), DM2 (a1c 7.7), HTN, HLD, Vfib on amio who presents to the ED with CC of SOB. In the ED he was AF with stable VS on RA.  CBC showing chronic anemia.  CMP notable for acute on chronic CKD with baseline around 2.1 and Cr now 2.6.  BNP elevated.  Lactate neg.  CXR showing pulmonary edema. He was subsequently admitted to the CCU for diuresis.  Endocrinology consulted for management of T2DM.          Interval HPI:   Overnight events: No acute events overnight. Patient in room 37118/66701 A. Blood glucose stable. BG at and above goal on current insulin regimen (SSI ). Steroid use- None (Finished hydrocortisone 100 mg q8hr). 8 Days Post-Op  Renal function- Normal   Vasopressors-  None       Endocrine will continue to follow and manage insulin orders inpatient.         Diet NPO Except for: Medication (per NG tube)     Eating:   NPO  Nausea: No  Hypoglycemia and intervention: No  Fever: No  TPN and/or TF: Yes  If yes, type of TF/TPN and rate: Has been off x24 hours due to pressors, but recently resumed at trickle feeds.     BP (!) 80/0 (BP Location: Left arm, Patient Position: Lying)   Pulse 95   Temp 97.6 °F (36.4 °C) (Oral)   Resp 17   Ht 6' (1.829  "m)   Wt 78.5 kg (173 lb)   SpO2 97%   BMI 23.46 kg/m²     Labs Reviewed and Include    Recent Labs   Lab 12/12/23  0411 12/12/23  1412   *  187* 178*   CALCIUM 8.2*  8.3* 8.4*   ALBUMIN 2.1*  2.1* 2.1*   PROT 6.4  --      136 135*   K 4.5  4.5 4.4   CO2 17*  17* 19*     105 103   BUN 8  7* 8   CREATININE 0.7  0.7 0.6   ALKPHOS 183*  --    ALT 25  --    AST 44*  --    BILITOT 1.7*  --      Lab Results   Component Value Date    WBC 33.19 (H) 12/12/2023    HGB 7.2 (L) 12/12/2023    HCT 21.8 (L) 12/12/2023    MCV 91 12/12/2023     12/12/2023     No results for input(s): "TSH", "FREET4" in the last 168 hours.  Lab Results   Component Value Date    HGBA1C 7.6 (H) 10/12/2023       Nutritional status:   Body mass index is 23.46 kg/m².  Lab Results   Component Value Date    ALBUMIN 2.1 (L) 12/12/2023    ALBUMIN 2.1 (L) 12/12/2023    ALBUMIN 2.1 (L) 12/12/2023     Lab Results   Component Value Date    PREALBUMIN 12 (L) 12/09/2023    PREALBUMIN 19 (L) 12/01/2023    PREALBUMIN 27 11/15/2023       Estimated Creatinine Clearance: 141.9 mL/min (based on SCr of 0.6 mg/dL).    Accu-Checks  Recent Labs     12/11/23  0011 12/11/23  0404 12/11/23  0741 12/11/23  1156 12/11/23  1507 12/11/23  2003 12/11/23  2341 12/12/23  0411 12/12/23  0757 12/12/23  1222   POCTGLUCOSE 206* 194* 168* 162* 147* 153* 186* 186* 178* 211*       Current Medications and/or Treatments Impacting Glycemic Control  Immunotherapy:    Immunosuppressants       None          Steroids:   Hormones (From admission, onward)      Start     Stop Route Frequency Ordered    12/08/23 1700  vasopressin (PITRESSIN) 0.2 Units/mL in dextrose 5 % (D5W) 100 mL infusion         -- IV Continuous 12/08/23 1556          Pressors:    Autonomic Drugs (From admission, onward)      Start     Stop Route Frequency Ordered    12/10/23 1045  EPINEPHrine 5 mg in dextrose 5% 250 mL infusion (premix)         -- IV Continuous 12/10/23 0943    12/09/23 2100  " midodrine tablet 15 mg         -- Oral 3 times daily 12/09/23 1839          Hyperglycemia/Diabetes Medications:   Antihyperglycemics (From admission, onward)      Start     Stop Route Frequency Ordered    12/11/23 1027  insulin aspart U-100 pen 0-10 Units         -- SubQ Every 4 hours PRN 12/11/23 0927    12/10/23 1200  insulin aspart U-100 pen 7 Units         -- SubQ 6 times per day 12/10/23 0947            ASSESSMENT and PLAN    Cardiac/Vascular  * LVAD (left ventricular assist device) present  Optimize BG control to improve wound healing        PAF (paroxysmal atrial fibrillation)  May increase insulin resistance.         Acute on chronic combined systolic and diastolic CHF, NYHA class 4  Managed per primary team  Optimize BG control  S/p LVAD     Renal/  Acute renal failure with acute tubular necrosis superimposed on stage 3b chronic kidney disease  Lab Results   Component Value Date    CREATININE 0.6 12/12/2023     Avoid insulin stacking  Titrate insulin slowly       Endocrine  Type 2 diabetes mellitus without complication, without long-term current use of insulin  BG goal 140-180.     - D/C scheduled insulin since plans for TF is to not advance.    - BG checks q4hr while on TF/NPO   - Novolog (aspart) insulin prn for BG excursions MDC (150/25)   - Hypoglycemia protocol in place    ** Please notify Endocrine for any change and/or advance in diet**  ** Please call Endocrine for any BG related issues **    Discharge Planning:   TBD. Please notify endocrinology prior to discharge.                 Erasmo Parrish, DNP, FNP  Endocrinology  [facility]

## 2023-12-12 NOTE — PROGRESS NOTES
"CONSULTATION LIAISON PSYCHIATRY PROGRESS NOTE    Patient Name: Radha Abbott  MRN: [id number]  Patient Class: IP- Inpatient  Admission Date: 11/9/2023  Attending Physician: Yuri Washington MD      SUBJECTIVE:   Patient is a 63-year-old man with Ischemic Cardiomyopathy (EF 10 -15%) and G3DD status post [employer] ICM placement on chronic dobutamine infusion, Coronary Artery Disease  status post x3 vessel Coronary Artery Bypass Graft back in 2009, Type 2 Diabetes Mellitus (most recent hemoglobin A1c 7.6%), Hypertension, Hyperlipidemia, History of Ventricular Fibrillation for which he is on amiodarone and Chronic Kidney Disease IIIb (baseline creatinine ~2-2.2) who was initially admitted on 11/9 with Congestive Heart Failure exacerbation and hypervolemia for which he was managed with with dobutamine and IV diuresis. He ultimately underwent IABP placed on 11/21 for mechanical hemodynamic support and subsequently underwent Left Ventricular Assistive Device placement on 12/1. Patient has been intubated twice, now extubated since 12/9 and has been voicing passive suicidal ideation.     Psychiatry consulted for "Expressed desire to 'meet his maker', s/p LVAD"    Today, patient was sitting up in bed having dialysis conducted in the room. Wife was also present bedside. Patient is somnolent and soft spoken.  He says he was able to sleep good last night. When asked how he is he doing, he give a "thumbs up" gesture. Patient's wife also mention that patient is doing good. No abnormal thoughts of "meeting his maker". Denies any other complaints.    OBJECTIVE:    Mental Status Exam:  General Appearance: dressed in hospital garb, lying in bed, thin and gaunt, ill appearing  Behavior: cooperative, appropriate eye-contact  Involuntary Movements and Motor Activity: no abnormal involuntary movements noted  Gait and Station: unable to assess - patient lying down or seated  Speech and Language: decreased spontaneity, increased latency of " "response, paucity of speech, soft  Mood: "good"  Affect: flat  Thought Process and Associations: linear, goal-directed, organized  Thought Content and Perceptions:: no suicidal or homicidal ideation, no auditory or visual hallucinations, no paranoid ideation, no ideas of reference, no evidence of delusions or psychosis  Sensorium and Orientation: grossly intact  Recent and Remote Memory: mild impairments noted  Attention and Concentration: grossly intact, attentive to conversation  Fund of Knowledge: grossly intact  Insight: limited/partial awareness of illness  Judgment: behavior is adequate/appropriate to circumstances    CAM ICU positive? no    ASSESSMENT & RECOMMENDATIONS   Adjustment Disorder, acute, with depressed mood      Psychology consulted, recommendations appreciated  No indication for medications at this time for this acute situational anxiety, does not endorse depression or suicidal ideation at this time     RISK ASSESSMENT  NO NEED FOR PEC patient NOT in any imminent danger of hurting self or others and not gravely disabled.   Continue to recommend sitter when patient's family not at bedside for extended time      FOLLOW UP  Psychiatry team will sign-off. Please reach out with any further questions.      DISPOSITION - once medically cleared:    Defer to medical team    Please contact ON CALL psychiatry service (24/7) for any acute issues that may arise.    Dr. Tan Verduzco   Psychiatry  [facility]  12/12/2023 9:56 AM    --------------------------------------------------------------------------------------------------------------------------------------------------------------------------------------------------------------------------------------    CONTINUED OBJECTIVE clinical data & findings reviewed and noted for above decision making    Current Medications:   Scheduled Meds:    amiodarone  400 mg Per NG tube Daily    atorvastatin  40 mg Per NG tube Daily    insulin aspart " U-100  7 Units Subcutaneous 6 times per day    meropenem (MERREM) IVPB  2 g Intravenous Q8H    micafungin (MYCAMINE) IVPB  100 mg Intravenous Q24H    midodrine  15 mg Oral TID    [START ON 12/13/2023] pantoprazole  40 mg Intravenous Daily    polyethylene glycol  17 g Per NG tube Daily    senna-docusate 8.6-50 mg  1 tablet Per NG tube Daily    vancomycin (VANCOCIN) IV (PEDS and ADULTS)  1,500 mg Intravenous Once    warfarin  4 mg Per NG tube Daily     PRN Meds: acetaminophen, albumin human 5%, albuterol sulfate, bisacodyL, dextrose 10 % in water (D10W), dextrose 10%, dextrose 10%, glucagon (human recombinant), insulin aspart U-100, magnesium hydroxide 400 mg/5 ml, magnesium sulfate IVPB, potassium chloride, potassium chloride, QUEtiapine, Flushing PICC/Midline Protocol **AND** [DISCONTINUED] sodium chloride 0.9% **AND** sodium chloride 0.9%, sodium phosphate 20.01 mmol in dextrose 5 % (D5W) 250 mL IVPB, sodium phosphate 30 mmol in dextrose 5 % (D5W) 250 mL IVPB, sodium phosphate 39.99 mmol in dextrose 5 % (D5W) 250 mL IVPB, Pharmacy to dose Vancomycin consult **AND** vancomycin - pharmacy to dose    Allergies:   Review of patient's allergies indicates:  No Known Allergies    Vitals  Vitals:    12/12/23 0900   BP:    Pulse: 87   Resp:    Temp:        Labs/Imaging/Studies:  Recent Results (from the past 24 hour(s))   Fibrinogen    Collection Time: 12/11/23 11:51 AM   Result Value Ref Range    Fibrinogen 699 (H) 182 - 400 mg/dL   Protime-INR    Collection Time: 12/11/23 11:51 AM   Result Value Ref Range    Prothrombin Time 13.0 (H) 9.0 - 12.5 sec    INR 1.2 0.8 - 1.2   Potassium    Collection Time: 12/11/23 11:51 AM   Result Value Ref Range    Potassium 4.2 3.5 - 5.1 mmol/L   Magnesium    Collection Time: 12/11/23 11:51 AM   Result Value Ref Range    Magnesium 1.9 1.6 - 2.6 mg/dL   APTT    Collection Time: 12/11/23 11:51 AM   Result Value Ref Range    aPTT 42.7 (H) 21.0 - 32.0 sec   POCT glucose    Collection Time:  12/11/23 11:56 AM   Result Value Ref Range    POCT Glucose 162 (H) 70 - 110 mg/dL   Renal function panel    Collection Time: 12/11/23  2:18 PM   Result Value Ref Range    Glucose 146 (H) 70 - 110 mg/dL    Sodium 137 136 - 145 mmol/L    Potassium 4.5 3.5 - 5.1 mmol/L    Chloride 102 95 - 110 mmol/L    CO2 24 23 - 29 mmol/L    BUN 10 8 - 23 mg/dL    Calcium 8.4 (L) 8.7 - 10.5 mg/dL    Creatinine 0.9 0.5 - 1.4 mg/dL    Albumin 2.0 (L) 3.5 - 5.2 g/dL    Phosphorus 1.4 (L) 2.7 - 4.5 mg/dL    eGFR >60.0 >60 mL/min/1.73 m^2    Anion Gap 11 8 - 16 mmol/L   Magnesium    Collection Time: 12/11/23  2:18 PM   Result Value Ref Range    Magnesium 1.9 1.6 - 2.6 mg/dL   Calcium, ionized    Collection Time: 12/11/23  2:18 PM   Result Value Ref Range    Ionized Calcium 1.08 1.06 - 1.42 mmol/L   ISTAT PROCEDURE    Collection Time: 12/11/23  3:05 PM   Result Value Ref Range    POC PH 7.442 7.35 - 7.45    POC PCO2 34.9 (L) 35 - 45 mmHg    POC PO2 188 (H) 80 - 100 mmHg    POC HCO3 23.8 (L) 24 - 28 mmol/L    POC BE 0 -2 to 2 mmol/L    POC SATURATED O2 100 95 - 100 %    POC Sodium 135 (L) 136 - 145 mmol/L    POC Potassium 4.5 3.5 - 5.1 mmol/L    POC TCO2 25 23 - 27 mmol/L    POC Ionized Calcium 1.08 1.06 - 1.42 mmol/L    POC Hematocrit 23 (L) 36 - 54 %PCV    Sample ARTERIAL     Site Stef/UAC     Allens Test N/A     DelSys Nasal Can     Mode SPONT     Flow 5    ISTAT Lactate    Collection Time: 12/11/23  3:05 PM   Result Value Ref Range    POC Lactate 0.46 0.36 - 1.25 mmol/L    Sample ARTERIAL     Site Stef/UAC     Allens Test N/A     DelSys Nasal Can     Mode SPONT     Flow 5    POCT glucose    Collection Time: 12/11/23  3:07 PM   Result Value Ref Range    POCT Glucose 147 (H) 70 - 110 mg/dL   Culture, Respiratory with Gram Stain    Collection Time: 12/11/23  4:43 PM    Specimen: Sputum, Expectorated; Respiratory   Result Value Ref Range    Gram Stain (Respiratory) <10 epithelial cells per low power field.     Gram Stain (Respiratory)  Rare WBC's     Gram Stain (Respiratory) Rare Gram positive cocci    Fibrinogen    Collection Time: 12/11/23  5:24 PM   Result Value Ref Range    Fibrinogen 746 (H) 182 - 400 mg/dL   Protime-INR    Collection Time: 12/11/23  5:24 PM   Result Value Ref Range    Prothrombin Time 13.3 (H) 9.0 - 12.5 sec    INR 1.3 (H) 0.8 - 1.2   Potassium    Collection Time: 12/11/23  5:24 PM   Result Value Ref Range    Potassium 4.9 3.5 - 5.1 mmol/L   Magnesium    Collection Time: 12/11/23  5:24 PM   Result Value Ref Range    Magnesium 1.8 1.6 - 2.6 mg/dL   APTT    Collection Time: 12/11/23  5:24 PM   Result Value Ref Range    aPTT 51.0 (H) 21.0 - 32.0 sec   POCT glucose    Collection Time: 12/11/23  8:03 PM   Result Value Ref Range    POCT Glucose 153 (H) 70 - 110 mg/dL   Renal function panel    Collection Time: 12/11/23  9:59 PM   Result Value Ref Range    Glucose 157 (H) 70 - 110 mg/dL    Sodium 134 (L) 136 - 145 mmol/L    Potassium 4.4 3.5 - 5.1 mmol/L    Chloride 103 95 - 110 mmol/L    CO2 20 (L) 23 - 29 mmol/L    BUN 7 (L) 8 - 23 mg/dL    Calcium 8.1 (L) 8.7 - 10.5 mg/dL    Creatinine 0.7 0.5 - 1.4 mg/dL    Albumin 2.0 (L) 3.5 - 5.2 g/dL    Phosphorus 2.2 (L) 2.7 - 4.5 mg/dL    eGFR >60.0 >60 mL/min/1.73 m^2    Anion Gap 11 8 - 16 mmol/L   Magnesium    Collection Time: 12/11/23  9:59 PM   Result Value Ref Range    Magnesium 2.0 1.6 - 2.6 mg/dL   Calcium, ionized    Collection Time: 12/11/23  9:59 PM   Result Value Ref Range    Ionized Calcium 1.03 (L) 1.06 - 1.42 mmol/L   Type & Screen    Collection Time: 12/11/23  9:59 PM   Result Value Ref Range    Group & Rh A POS     Indirect Jimenez NEG     Specimen Outdate 12/14/2023 23:59    ISTAT PROCEDURE    Collection Time: 12/11/23 11:01 PM   Result Value Ref Range    POC PH 7.423 7.35 - 7.45    POC PCO2 30.1 (L) 35 - 45 mmHg    POC PO2 177 (H) 80 - 100 mmHg    POC HCO3 19.7 (L) 24 - 28 mmol/L    POC BE -5 (L) -2 to 2 mmol/L    POC SATURATED O2 100 95 - 100 %    POC TCO2 21 (L) 23 - 27  mmol/L    Sample ARTERIAL     Site [facility]     Allens Test N/A     DelSys Nasal Can    ISTAT Lactate    Collection Time: 12/11/23 11:02 PM   Result Value Ref Range    POC Lactate 0.49 0.36 - 1.25 mmol/L    Sample ARTERIAL     Site [facility]     Allens Test N/A     DelSys Nasal Can    POCT glucose    Collection Time: 12/11/23 11:41 PM   Result Value Ref Range    POCT Glucose 186 (H) 70 - 110 mg/dL   APTT    Collection Time: 12/12/23 12:28 AM   Result Value Ref Range    aPTT 62.0 (H) 21.0 - 32.0 sec   Protime-INR    Collection Time: 12/12/23 12:28 AM   Result Value Ref Range    Prothrombin Time 12.6 (H) 9.0 - 12.5 sec    INR 1.2 0.8 - 1.2   Fibrinogen    Collection Time: 12/12/23 12:28 AM   Result Value Ref Range    Fibrinogen 685 (H) 182 - 400 mg/dL   Clostridium difficile EIA    Collection Time: 12/12/23  1:10 AM    Specimen: Stool   Result Value Ref Range    C. diff Antigen Negative Negative    C difficile Toxins A+B, EIA Negative Negative   Lactate dehydrogenase    Collection Time: 12/12/23  4:11 AM   Result Value Ref Range     (H) 110 - 260 U/L   Comprehensive metabolic panel    Collection Time: 12/12/23  4:11 AM   Result Value Ref Range    Sodium 136 136 - 145 mmol/L    Potassium 4.5 3.5 - 5.1 mmol/L    Chloride 104 95 - 110 mmol/L    CO2 17 (L) 23 - 29 mmol/L    Glucose 187 (H) 70 - 110 mg/dL    BUN 8 8 - 23 mg/dL    Creatinine 0.7 0.5 - 1.4 mg/dL    Calcium 8.2 (L) 8.7 - 10.5 mg/dL    Total Protein 6.4 6.0 - 8.4 g/dL    Albumin 2.1 (L) 3.5 - 5.2 g/dL    Total Bilirubin 1.7 (H) 0.1 - 1.0 mg/dL    Alkaline Phosphatase 183 (H) 55 - 135 U/L    AST 44 (H) 10 - 40 U/L    ALT 25 10 - 44 U/L    eGFR >60.0 >60 mL/min/1.73 m^2    Anion Gap 15 8 - 16 mmol/L   Magnesium    Collection Time: 12/12/23  4:11 AM   Result Value Ref Range    Magnesium 1.9 1.6 - 2.6 mg/dL   Phosphorus    Collection Time: 12/12/23  4:11 AM   Result Value Ref Range    Phosphorus 2.4 (L) 2.7 - 4.5 mg/dL   CBC auto differential    Collection  Time: 12/12/23  4:11 AM   Result Value Ref Range    WBC 33.19 (H) 3.90 - 12.70 K/uL    RBC 2.39 (L) 4.60 - 6.20 M/uL    Hemoglobin 7.2 (L) 14.0 - 18.0 g/dL    Hematocrit 21.8 (L) 40.0 - 54.0 %    MCV 91 82 - 98 fL    MCH 30.1 27.0 - 31.0 pg    MCHC 33.0 32.0 - 36.0 g/dL    RDW 18.8 (H) 11.5 - 14.5 %    Platelets 329 150 - 450 K/uL    MPV 11.5 9.2 - 12.9 fL    Immature Granulocytes 4.6 (H) 0.0 - 0.5 %    Gran # (ANC) 29.4 (H) 1.8 - 7.7 K/uL    Immature Grans (Abs) 1.53 (H) 0.00 - 0.04 K/uL    Lymph # 0.6 (L) 1.0 - 4.8 K/uL    Mono # 1.5 (H) 0.3 - 1.0 K/uL    Eos # 0.0 0.0 - 0.5 K/uL    Baso # 0.09 0.00 - 0.20 K/uL    nRBC 2 (A) 0 /100 WBC    Gran % 88.7 (H) 38.0 - 73.0 %    Lymph % 1.8 (L) 18.0 - 48.0 %    Mono % 4.6 4.0 - 15.0 %    Eosinophil % 0.0 0.0 - 8.0 %    Basophil % 0.3 0.0 - 1.9 %    Platelet Estimate Appears normal     Aniso Moderate     Poik Moderate     Melina Cells Occasional     Spherocytes Moderate     Schistocytes Present     Basophilic Stippling Occasional     Toxic Granulation Present     Fragmented Cells Moderate     Differential Method Automated    Vancomycin, Random    Collection Time: 12/12/23  4:11 AM   Result Value Ref Range    Vancomycin, Random 10.5 Not established ug/mL   Renal function panel    Collection Time: 12/12/23  4:11 AM   Result Value Ref Range    Glucose 187 (H) 70 - 110 mg/dL    Sodium 136 136 - 145 mmol/L    Potassium 4.5 3.5 - 5.1 mmol/L    Chloride 105 95 - 110 mmol/L    CO2 17 (L) 23 - 29 mmol/L    BUN 7 (L) 8 - 23 mg/dL    Calcium 8.3 (L) 8.7 - 10.5 mg/dL    Creatinine 0.7 0.5 - 1.4 mg/dL    Albumin 2.1 (L) 3.5 - 5.2 g/dL    Phosphorus 2.3 (L) 2.7 - 4.5 mg/dL    eGFR >60.0 >60 mL/min/1.73 m^2    Anion Gap 14 8 - 16 mmol/L   Calcium, ionized    Collection Time: 12/12/23  4:11 AM   Result Value Ref Range    Ionized Calcium 1.00 (L) 1.06 - 1.42 mmol/L   POCT glucose    Collection Time: 12/12/23  4:11 AM   Result Value Ref Range    POCT Glucose 186 (H) 70 - 110 mg/dL    Fibrinogen    Collection Time: 12/12/23  6:07 AM   Result Value Ref Range    Fibrinogen 746 (H) 182 - 400 mg/dL   Protime-INR    Collection Time: 12/12/23  6:07 AM   Result Value Ref Range    Prothrombin Time 12.5 9.0 - 12.5 sec    INR 1.2 0.8 - 1.2   POCT Capillary Blood Gas-Resp    Collection Time: 12/12/23  6:08 AM   Result Value Ref Range    POC MetHb 1.7 0.0 - 3.0 %    POC Temp 37.0 C    Specimen source Venous     Performed By: LTRAN     BIPAP 0    POCT METHEMOGLOBIN Q24H    Collection Time: 12/12/23  6:08 AM   Result Value Ref Range    Methemoglobin 1.7 0 - 3 %   POCT glucose    Collection Time: 12/12/23  7:57 AM   Result Value Ref Range    POCT Glucose 178 (H) 70 - 110 mg/dL   ISTAT PROCEDURE    Collection Time: 12/12/23  8:26 AM   Result Value Ref Range    POC PH 7.277 7.25 - 7.45    POC PCO2 40.8 27 - 49 mmHg    POC PO2 34 17 - 41 mmHg    POC HCO3 19.0 12 - 29 mmol/L    POC BE -8 <=-9 mmol/L    POC SATURATED O2 59 %    POC TCO2 20 (L) 23 - 27 mmol/L    Sample CORD JUAN     Site Other     Allens Test N/A     DelSys Nasal Can     Mode SPONT     Flow 5    ISTAT PROCEDURE    Collection Time: 12/12/23  8:31 AM   Result Value Ref Range    POC PH 7.400 7.35 - 7.45    POC PCO2 29.3 (L) 35 - 45 mmHg    POC PO2 172 (H) 80 - 100 mmHg    POC HCO3 18.2 (L) 24 - 28 mmol/L    POC BE -7 (L) -2 to 2 mmol/L    POC SATURATED O2 100 95 - 100 %    POC TCO2 19 (L) 23 - 27 mmol/L    Sample ARTERIAL     Site Stef/UAC     Allens Test N/A     DelSys Nasal Can     Mode SPONT     Flow 5    ISTAT Lactate    Collection Time: 12/12/23  8:37 AM   Result Value Ref Range    POC Lactate 0.64 0.36 - 1.25 mmol/L    Sample ARTERIAL     Site Stef/UAC     Allens Test N/A     DelSys Nasal Can     Mode SPONT     Flow 5     Sp02 100      Imaging Results              X-Ray Chest AP Portable (Final result)  Result time 11/09/23 19:08:29      Final result by Norm Mccain MD (11/09/23 19:08:29)                   Impression:      As  above      Electronically signed by: Norm Mccain MD  Date:    11/09/2023  Time:    19:08               Narrative:    EXAMINATION:  XR CHEST AP PORTABLE    CLINICAL HISTORY:  pulmonary edema;    TECHNIQUE:  Single frontal view of the chest was performed.    COMPARISON:  11/09/2023    FINDINGS:  Right PICC catheter tip projects over the mid to distal SVC.  Left chest wall pacer stable.  There is patchy increased interstitial and parenchymal attenuation bilaterally, similar to the previous examination allowing for shallow inspiratory effort on current exam.  There is no pneumothorax or significant interval detrimental change in the cardiopulmonary status since the previous exam.                                       X-Ray Chest AP Portable (Final result)  Result time 11/09/23 09:56:37      Final result by Jake Gupta MD (11/09/23 09:56:37)                   Impression:      The cardiopericardial silhouette remains enlarged.    Mild pulmonary venous hypertension, possibly related to CHF.    Additional findings as above.      Electronically signed by: Jake Gupta  Date:    11/09/2023  Time:    09:56               Narrative:    EXAMINATION:  XR CHEST AP PORTABLE    CLINICAL HISTORY:  CHF;    TECHNIQUE:  Single frontal view of the chest was performed.    COMPARISON:  Portable chest x-ray 10/30/2023    FINDINGS:  Midline thoracic trachea, allowing for rightward rotation of the patient's torso.    The cardiopericardial silhouette remains enlarged, similar to the comparison study.    Left subclavian transvenous ICD, sternotomy wires, and visualized portions of a right upper extremity PICC line demonstrate no adverse interval changes from 10/30/2023.    Mildly engorged pulmonary vasculature, especially in the upper lobes.  Although patient positioning could contribute to this, some underlying pulmonary venous hypertension is not excluded.    Hazy increased opacities projecting over either lower lung are favored to  be related to superimposed chest wall soft tissues.  Some new underlying pulmonary or pleural abnormalities, such as mild pulmonary edema, is not excluded.    No discrete pneumothorax.    No blunting of either lateral costophrenic angle.    Degenerative changes in the suboptimally visualized spine.                                      show

## 2023-12-12 NOTE — PROGRESS NOTES
12/12/2023  Monet Aguiar    Current provider:  Sandhya Price MD    Device interrogation:      12/12/2023    12:00 PM 12/12/2023    11:00 AM 12/12/2023    10:00 AM 12/12/2023     9:00 AM 12/12/2023     8:00 AM 12/12/2023     7:00 AM 12/12/2023     6:00 AM   TXP LVAD INTERROGATIONS   Type HeartMate3 HeartMate3 HeartMate3 HeartMate3 HeartMate3 HeartMate3 HeartMate3   Flow 4.1 3.9 4 4 4 4 3.9   Speed 5000 4900 4900 4900 4900 4900 4900   PI 3.8 4 4.1 3.9 4 4.5 4.4   Power ([place]) 3.3 3.6 3.4 3.3 3.3 3.4 3.3   LSL  4500    4500 4500   Pulsatility  Intermittent pulse    Intermittent pulse Intermittent pulse          Rounded on [facility] to ensure all mechanical assist device settings (IABP or VAD) were appropriate and all parameters were within limits.  I was able to ensure all back up equipment was present, the staff had no issues, and the Perfusion Department daily rounding was complete.      For implantable VADs: Interrogation of Ventricular assist device was performed with analysis of device parameters and review of device function. I have personally reviewed the interrogation findings and agree with findings as stated.     In emergency, the nursing units have been notified to contact the perfusion department either by:  Calling [phone] from 630am to 4pm Mon thru Fri, utilizing the On-Call Finder functionality of Epic and searching for Perfusion, or by contacting the hospital  from 4pm to 630am and on weekends and asking to speak with the perfusionist on call.    12:37 PM

## 2023-12-12 NOTE — PT/OT/SLP RE-EVAL
Physical Therapy Co-Reevaluation and Co-Treatment  Patient Name:  Radha Abbott   MRN:  [id number]  Admit Date: 11/9/2023  Admitting Diagnosis:  LVAD (left ventricular assist device) present   Length of Stay: 33 days  Recent Surgery: Procedure(s) (LRB):  CLOSURE, WOUND, STERNUM (N/A)  INSERTION, GRAFT, PERICARDIUM  DRAINAGE, PLEURAL EFFUSION 8 Days Post-Op    Hospital Course:  Pt presents for reevaluation s/p LVAD implant 12/1 and chest closure 12/4  Please refer to PT initial evaluation for PLOF and social history.  Recommendations:     Discharge Recommendations:  High Intensity Therapy  Discharge Equipment Recommendations: to be determined by next level of care   Barriers to discharge:  current medical condition    Appropriate transfer level with nursing staff: bed level therex    Plan:     During this hospitalization, patient to be seen 6 x/week to address the identified rehab impairments via gait training, therapeutic activities, therapeutic exercises, neuromuscular re-education and progress towards the established goals.  Plan of Care Expires:  01/11/24  Plan of Care Reviewed with: patient, spouse    Assessment     Radha Abbott is a 63 y.o. male admitted with a medical diagnosis of LVAD (left ventricular assist device) present. Pt tolerated reevaluation well today. Pt found on CRRT with stable pressor support. RN and medical team ok'ed EOB mobility on this date. He was able to tolerate sitting EOB for 10 minutes with CGA and no LOB. VSS throughout. Pt with good LE strength and motivated to participate and improve. Pt will continue to benefit from skilled PT services during this hospital admit to continue to improve transfer ability and efficiency as well as continue to progress pt's ambulation distance and cardiopulmonary endurance to maximize pt's functional independence and return to PLOF.     Problem List: weakness, impaired endurance, impaired functional mobility, impaired self care skills, gait instability,  impaired balance, impaired cognition, decreased upper extremity function, decreased lower extremity function, decreased safety awareness, pain, impaired coordination, impaired fine motor, impaired skin, impaired cardiopulmonary response to activity, edema.  Rehab Prognosis: Good; patient would benefit from acute skilled PT services to address these deficits and reach maximum level of function.      Goals:   Multidisciplinary Problems       Physical Therapy Goals          Problem: Physical Therapy    Goal Priority Disciplines Outcome Goal Variances Interventions   Physical Therapy Goal     PT, PT/OT Ongoing, Progressing     Description: Goals to be met by: 23     Patient will increase functional independence with mobility by performin. Sit to stand transfer with modified independence  2. Bed to chair transfer with supervision  3. Gait  x 1500 feet with supervision using physician approved AD with standing erst breaks prn.   4. Lower extremity exercise program x30 reps per handout, with independence  5. Recite 3/3 sternal precautions and remain complaint with precautions throughout session with no verbal cues                     Multidisciplinary Problems (Resolved)          Problem: Physical Therapy    Goal Priority Disciplines Outcome Goal Variances Interventions   Physical Therapy Goal   (Resolved)     PT, PT/OT Met     Description: Goals to be met by: 23     Patient will increase functional independence with mobility by performin. Evaluate pt's need for physical therapy.                          Subjective     RN notified prior to session. Wife present upon PT entrance into room. Patient agreeable to PT re-evaluation    Chief Complaint: pt with no c/o pain  Patient/Family Comments/goals: get stronger  Pain/Comfort:  Pain Rating 1: 0/10  Pain Rating Post-Intervention 1: 0/10    Objective:     Additional staff present: OT; OT for cotx due to pt's multiple medical comorbidities and functional  deficits req'ing two skilled therapists to appropriately progress pt's musculoskeletal strength, neuromuscular control, and endurance while taking into consideration severe medical acuity in the ICU    Patient found HOB elevated with: telemetry, blood pressure cuff, pulse ox (continuous), arterial line, LVAD, Trialysis, NG tube, wound vac, PICC line, peripheral IV, oxygen (Ketty; CRRT actively running via Rt IJ trialysis)     General Precautions: Standard, Cardiac LVAD, fall, sternal   Orthopedic Precautions:N/A   Braces: N/A   Respiratory Status: Nasal Cannula 4L and Nitric Oxide  Vitals: BP (!) 80/0 (BP Location: Left arm, Patient Position: Lying)   Pulse 91   Temp 97.6 °F (36.4 °C) (Oral)   Resp 17   Ht 6' (1.829 m)   Wt 78.5 kg (173 lb)   SpO2 99%   BMI 23.46 kg/m²      Exam:  Cognition:   Alert and Cooperative  Patient is oriented to Person, Place, Time, Situation  Command following: Follows multistep verbal commands  Fluency: clear/fluent  Hearing: Intact  Vision:  Intact  Skin Integrity: Visible skin intact  Posture: slouched posture and rounded shoulders  Physical Exam:    Left UE Left LE Right UE Right LE   Edema absent absent absent absent   ROM AROM WFL AROM WFL AROM WFL AROM WFL   Strength 4/5 4/5 4/5 4/5   Sensation intact to light touch intact to light touch intact to light touch intact to light touch   Coordination normal normal normal normal     Outcome Measures:  AM-PAC 6 CLICK MOBILITY  Turning over in bed (including adjusting bedclothes, sheets and blankets)?: 2  Sitting down on and standing up from a chair with arms (e.g., wheelchair, bedside commode, etc.): 1  Moving from lying on back to sitting on the side of the bed?: 2  Moving to and from a bed to a chair (including a wheelchair)?: 1  Need to walk in hospital room?: 1  Climbing 3-5 steps with a railing?: 1  Basic Mobility Total Score: 8     Functional Mobility:    Bed Mobility:   Supine to Sit: total assistance and 2 persons for LE  management and for log rolling technique; to Lt side of bed  Scooting anteriorly to EOB to have both feet planted on floor: total assistance   Sit to Supine: total assistance and 2 persons for LE management and for trunk management; to Lt side of bed    Sitting Balance at Edge of Bed:  Static Sitting Balance: Fair : able to sit unsupported without balance loss and without UE support  Dynamic Sitting Balance: Poor: able to sit unsupported with moderate assistance and reach ipsilaterally or anteriorly. Unable to cross midline.  Assistance Level Required: Contact Guard Assistance  Time: 10 minutes  Postural deviations noted: slouched posture and rounded shoulders  Comments: Time EOB focused primarily on tolerance to upright positioning, cardiopulmonary response and endurance for activities, and strength of postural musculature to perform dynamic sitting to prepare for tasks in the home. Pt able to accept internal and external perturbations to balance while seated EOB with appropriate trunk response to remain upright with no LOB and intermittent UE support. Able to perform reaches inside [facility]. Pt with increased assist needed for dynamic tasks with mod/max A needed for balance to prevent posterior LOB.    Transfers/Gait: Deferred due to pt's performance with above listed functional mobility and on CRRT + pressors     Education:  Time provided for education, counseling and discussion of health disposition in regards to patient's current status  All questions answered within PT scope of practice and to patient's satisfaction  PT role in POC to address current functional deficits  Pt educated on proper body mechanics, safety techniques, and energy conservation with PT facilitation and cueing throughout session  Whiteboard updated with therapist name and pt's current mobility status documented above    Patient left HOB elevated with all lines intact, call button in reach, RN notified, and wife present.    Time Tracking:      PT Received On: 12/12/23  PT Start Time: 1112     PT Stop Time: 1139  PT Total Time (min): 27 min     Billable Minutes: Re-eval 10 minutes and Therapeutic Exercise 17 minutes    12/12/2023

## 2023-12-12 NOTE — PROGRESS NOTES
Pharmacokinetic Assessment Follow Up: IV Vancomycin    Vancomycin Regimen Assessment & Plan  - Vancomycin level drawn with morning labs today resulted as 10.5 mcg/mL, goal trough 10-20 mcg/mL.  - Nephrology following for RRT, did continuous SLED yesterday. Will continue pulse dosing.  - Administer vancomycin 1500 mg IV x1 dose today since level is <20. Draw vancomycin level with morning labs tomorrow. Will re-dose as needed depending on level and RRT plans.    Drug levels (last 3 results):  Recent Labs   Lab Result Units 12/11/23  0502 12/12/23  0411   Vancomycin, Random ug/mL 14.1 10.5     Pharmacy will continue to follow and monitor vancomycin.    Please contact pharmacy at extension [phone] for questions regarding this assessment.    Thank you for the consult,   Kobe Pérez     Patient brief summary:  Radha Abbott is a 63 y.o. male initiated on antimicrobial therapy with IV Vancomycin for treatment of bacteremia    Drug Allergies:   Review of patient's allergies indicates:  No Known Allergies    Actual Body Weight:   78 kg    Renal Function:   Estimated Creatinine Clearance: 121.6 mL/min (based on SCr of 0.7 mg/dL).,     Dialysis Method (if applicable):  SLED

## 2023-12-12 NOTE — PT/OT/SLP PROGRESS
Speech Language Pathology Treatment    Patient Name:  Radha Abbott   MRN:  [id number]  Admitting Diagnosis: Acute on chronic combined systolic and diastolic CHF, NYHA class 4    Recommendations:                 General Recommendations:  ENT evaluation given persistent dysphonia and swallow dysfunction   Diet recommendations:  NPO, Liquid Diet Level: NPO   Aspiration Precautions: Ice chips sparingly for the purpose of swallowing exercises   General Precautions: Standard,    Communication strategies:  none    Assessment:     Radha Abbott is a 63 y.o. male with an SLP diagnosis of Dysphagia and Dysphonia.    Subjective     Pt awake and alert   RN in agreement with SLP seeing at this time  Spouse at the bedside     Pain/Comfort:  Pain Rating 1: 0/10  Pain Rating Post-Intervention 1: 0/10    Respiratory Status: Nasal cannula, flow 5 L/min    Objective:     Has the patient been evaluated by SLP for swallowing?   Yes  Keep patient NPO? No   Current Respiratory Status:        Pt seen for ongoing dysphagia therapy. Pt upright and alert in bed. Pt remains with significantly hoarse and breathy vocal quality. Pt cough strength significantly weak. Pt offered trials of ice chips x4, teaspoons of water x3 and 1/2 tsp of applesauce x3. Pt with diminished rise to digital palpation during swallow. No overt clinical signs of aspiration immediately observed however pt then with  delayed cough response post trials. Aspiration unable to be ruled out and highly suspected given poor swallow strength and disordered vocal quality. SLP discussed with spouse and patient at length overall swallow structures , function and concerns. SLP introduced akilah manaeuver which pt able to be complete with fair ability. SLP discussed utilizing ice chips sparingly for comfort and to complete swallow exercises. SLP discussed with LVAD  team concerns for needing ENT assessment in future should swallow and voice function not improve.     Goals:    Multidisciplinary Problems       SLP Goals          Problem: SLP    Goal Priority Disciplines Outcome   SLP Goal     SLP    Description: Speech Language Pathology Goals  Goals expected to be met by 12/24    1. Pt will participate in ongoing assessment of swallow function to help determine least restrictive diet                            Plan:     Patient to be seen:  4 x/week   Plan of Care expires:     Plan of Care reviewed with:  patient, spouse   SLP Follow-Up:  Yes       Discharge recommendations:      Barriers to Discharge:  None    Time Tracking:     SLP Treatment Date:   12/12/23  Speech Start Time:  0859  Speech Stop Time:  0918     Speech Total Time (min):  19 min    Billable Minutes: Treatment Swallowing Dysfunction 10 and Self Care/Home Management Training 9    12/12/2023

## 2023-12-12 NOTE — NURSING
SICU PLAN OF CARE NOTE    Dx: Acute on chronic combined systolic and diastolic CHF, NYHA class 4    Goals of Care: MAP 75-85    Vital Signs (last 12 hours):   Temp:  [97.7 °F (36.5 °C)-98.2 °F (36.8 °C)]   Pulse:  [84-96]   Resp:  [17-20]   SpO2:  [98 %]   Arterial Line BP: (75-98)/(53-76)      Neuro: AAO x4    Cardiac:    Respiratory: Nasal Cannula    Gtts: Epinephrine , Vasopressin, Angiotensin , and Heparin    Urine Output: Anuric 0 cc/shift    Dialysis: SLED, UF Rate: 200--500/hr    Drains:     Diet: NPO. Tube feeds held    VAD 5230-9902 Speed. See flowsheets for more details       Labs/Accuchecks: q4 accuchecks    Skin: no noted skin breakdown. Heels elevated off of bed. Pt repositioned q2 hrs. Lines and  devices free from skin contact.     Shift Events: Pt to Ct for possible source of infection and c/o abdominal pain. Plan for [facility] to be off by morning. MD wants pt to be euvolemic with crrt. Pt off CRRT for 4 hours.

## 2023-12-13 PROBLEM — D72.829 LEUKOCYTOSIS: Status: ACTIVE | Noted: 2023-12-13

## 2023-12-13 LAB
ALBUMIN SERPL BCP-MCNC: 2 G/DL (ref 3.5–5.2)
ALBUMIN SERPL BCP-MCNC: 2 G/DL (ref 3.5–5.2)
ALBUMIN SERPL BCP-MCNC: 2.1 G/DL (ref 3.5–5.2)
ALLENS TEST: ABNORMAL
ALLENS TEST: NORMAL
ALP SERPL-CCNC: 171 U/L (ref 55–135)
ALP SERPL-CCNC: 172 U/L (ref 55–135)
ALP SERPL-CCNC: 173 U/L (ref 55–135)
ALT SERPL W/O P-5'-P-CCNC: 22 U/L (ref 10–44)
ALT SERPL W/O P-5'-P-CCNC: 24 U/L (ref 10–44)
ALT SERPL W/O P-5'-P-CCNC: 24 U/L (ref 10–44)
ANION GAP SERPL CALC-SCNC: 11 MMOL/L (ref 8–16)
ANION GAP SERPL CALC-SCNC: 12 MMOL/L (ref 8–16)
ANION GAP SERPL CALC-SCNC: 12 MMOL/L (ref 8–16)
ANION GAP SERPL CALC-SCNC: 13 MMOL/L (ref 8–16)
ANISOCYTOSIS BLD QL SMEAR: ABNORMAL
APTT PPP: 41.2 SEC (ref 21–32)
AST SERPL-CCNC: 41 U/L (ref 10–40)
AST SERPL-CCNC: 43 U/L (ref 10–40)
AST SERPL-CCNC: 43 U/L (ref 10–40)
BACTERIA SPEC AEROBE CULT: NORMAL
BACTERIA SPEC AEROBE CULT: NORMAL
BASO STIPL BLD QL SMEAR: ABNORMAL
BASOPHILS # BLD AUTO: 0.04 K/UL (ref 0–0.2)
BASOPHILS NFR BLD: 0.2 % (ref 0–1.9)
BILIRUB DIRECT SERPL-MCNC: 0.8 MG/DL (ref 0.1–0.3)
BILIRUB DIRECT SERPL-MCNC: 0.8 MG/DL (ref 0.1–0.3)
BILIRUB SERPL-MCNC: 1.6 MG/DL (ref 0.1–1)
BIPAP: 0
BNP SERPL-MCNC: 1460 PG/ML (ref 0–99)
BUN SERPL-MCNC: 10 MG/DL (ref 8–23)
BUN SERPL-MCNC: 6 MG/DL (ref 8–23)
CA-I BLDV-SCNC: 1.08 MMOL/L (ref 1.06–1.42)
CA-I BLDV-SCNC: 1.09 MMOL/L (ref 1.06–1.42)
CALCIUM SERPL-MCNC: 8.3 MG/DL (ref 8.7–10.5)
CALCIUM SERPL-MCNC: 8.4 MG/DL (ref 8.7–10.5)
CALCIUM SERPL-MCNC: 8.4 MG/DL (ref 8.7–10.5)
CALCIUM SERPL-MCNC: 8.6 MG/DL (ref 8.7–10.5)
CHLORIDE SERPL-SCNC: 103 MMOL/L (ref 95–110)
CHLORIDE SERPL-SCNC: 105 MMOL/L (ref 95–110)
CO2 SERPL-SCNC: 19 MMOL/L (ref 23–29)
CO2 SERPL-SCNC: 19 MMOL/L (ref 23–29)
CO2 SERPL-SCNC: 20 MMOL/L (ref 23–29)
CO2 SERPL-SCNC: 20 MMOL/L (ref 23–29)
CREAT SERPL-MCNC: 0.6 MG/DL (ref 0.5–1.4)
CREAT SERPL-MCNC: 0.9 MG/DL (ref 0.5–1.4)
CRP SERPL-MCNC: 196.7 MG/L (ref 0–8.2)
DELSYS: ABNORMAL
DELSYS: NORMAL
DELSYS: NORMAL
DIFFERENTIAL METHOD BLD: ABNORMAL
EOSINOPHIL # BLD AUTO: 0.1 K/UL (ref 0–0.5)
EOSINOPHIL NFR BLD: 0.2 % (ref 0–8)
ERYTHROCYTE [DISTWIDTH] IN BLOOD BY AUTOMATED COUNT: 19.4 % (ref 11.5–14.5)
ERYTHROCYTE [SEDIMENTATION RATE] IN BLOOD BY WESTERGREN METHOD: 19 MM/H
ERYTHROCYTE [SEDIMENTATION RATE] IN BLOOD BY WESTERGREN METHOD: 24 MM/H
EST. GFR  (NO RACE VARIABLE): >60 ML/MIN/1.73 M^2
FIO2: 32
FIO2: 32
FIO2: 40 %
FLOW: 3
FLOW: 3
FLOW: 5
GLUCOSE SERPL-MCNC: 180 MG/DL (ref 70–110)
GLUCOSE SERPL-MCNC: 182 MG/DL (ref 70–110)
GLUCOSE SERPL-MCNC: 207 MG/DL (ref 70–110)
GLUCOSE SERPL-MCNC: 211 MG/DL (ref 70–110)
GRAM STN SPEC: NORMAL
HCO3 UR-SCNC: 19.8 MMOL/L (ref 24–28)
HCO3 UR-SCNC: 20.7 MMOL/L (ref 24–28)
HCO3 UR-SCNC: 21.8 MMOL/L (ref 24–28)
HCO3 UR-SCNC: 23.1 MMOL/L (ref 24–28)
HCT VFR BLD AUTO: 21 % (ref 40–54)
HCT VFR BLD CALC: 23 %PCV (ref 36–54)
HCT VFR BLD CALC: 34 %PCV (ref 36–54)
HGB BLD-MCNC: 6.8 G/DL (ref 14–18)
HYPOCHROMIA BLD QL SMEAR: ABNORMAL
IMM GRANULOCYTES # BLD AUTO: 0.87 K/UL (ref 0–0.04)
IMM GRANULOCYTES NFR BLD AUTO: 3.3 % (ref 0–0.5)
INR PPP: 1.4 (ref 0.8–1.2)
LDH SERPL L TO P-CCNC: 0.36 MMOL/L (ref 0.36–1.25)
LDH SERPL L TO P-CCNC: 0.42 MMOL/L (ref 0.36–1.25)
LDH SERPL L TO P-CCNC: 0.5 MMOL/L (ref 0.36–1.25)
LDH SERPL L TO P-CCNC: 609 U/L (ref 110–260)
LYMPHOCYTES # BLD AUTO: 0.5 K/UL (ref 1–4.8)
LYMPHOCYTES NFR BLD: 2 % (ref 18–48)
MAGNESIUM SERPL-MCNC: 1.8 MG/DL (ref 1.6–2.6)
MAGNESIUM SERPL-MCNC: 1.8 MG/DL (ref 1.6–2.6)
MCH RBC QN AUTO: 29.3 PG (ref 27–31)
MCHC RBC AUTO-ENTMCNC: 32.4 G/DL (ref 32–36)
MCV RBC AUTO: 91 FL (ref 82–98)
METHEMOGLOBIN: 0 % (ref 0–3)
MODE: ABNORMAL
MODE: NORMAL
MODE: NORMAL
MONOCYTES # BLD AUTO: 1.4 K/UL (ref 0.3–1)
MONOCYTES NFR BLD: 5.2 % (ref 4–15)
NEUTROPHILS # BLD AUTO: 23.7 K/UL (ref 1.8–7.7)
NEUTROPHILS NFR BLD: 89.1 % (ref 38–73)
NRBC BLD-RTO: 3 /100 WBC
PCO2 BLDA: 31 MMHG (ref 35–45)
PCO2 BLDA: 32.5 MMHG (ref 35–45)
PCO2 BLDA: 34.1 MMHG (ref 35–45)
PCO2 BLDA: 36.4 MMHG (ref 35–45)
PH SMN: 7.41 [PH] (ref 7.35–7.45)
PHOSPHATE SERPL-MCNC: 1.4 MG/DL (ref 2.7–4.5)
PHOSPHATE SERPL-MCNC: 1.4 MG/DL (ref 2.7–4.5)
PHOSPHATE SERPL-MCNC: 1.8 MG/DL (ref 2.7–4.5)
PHOSPHATE SERPL-MCNC: 1.8 MG/DL (ref 2.7–4.5)
PLATELET # BLD AUTO: 358 K/UL (ref 150–450)
PLATELET BLD QL SMEAR: ABNORMAL
PMV BLD AUTO: 11.4 FL (ref 9.2–12.9)
PO2 BLDA: 161 MMHG (ref 80–100)
PO2 BLDA: 164 MMHG (ref 80–100)
PO2 BLDA: 187 MMHG (ref 80–100)
PO2 BLDA: 26 MMHG (ref 40–60)
PO2 BLDA: 33 MMHG (ref 40–60)
POC BE: -2 MMOL/L
POC BE: -3 MMOL/L
POC BE: -4 MMOL/L
POC BE: -5 MMOL/L
POC IONIZED CALCIUM: 1.1 MMOL/L (ref 1.06–1.42)
POC IONIZED CALCIUM: 1.16 MMOL/L (ref 1.06–1.42)
POC METHB: 0 % (ref 0–3)
POC PERFORMED BY: NORMAL
POC SATURATED O2: 100 % (ref 95–100)
POC SATURATED O2: 100 % (ref 95–100)
POC SATURATED O2: 49 % (ref 95–100)
POC SATURATED O2: 55 % (ref 95–100)
POC SATURATED O2: 99 % (ref 95–100)
POC TCO2: 21 MMOL/L (ref 23–27)
POC TCO2: 22 MMOL/L (ref 23–27)
POC TCO2: 23 MMOL/L (ref 23–27)
POC TCO2: 24 MMOL/L (ref 24–29)
POC TEMPERATURE: 37 C
POCT GLUCOSE: 147 MG/DL (ref 70–110)
POCT GLUCOSE: 160 MG/DL (ref 70–110)
POCT GLUCOSE: 166 MG/DL (ref 70–110)
POCT GLUCOSE: 175 MG/DL (ref 70–110)
POCT GLUCOSE: 187 MG/DL (ref 70–110)
POCT GLUCOSE: 196 MG/DL (ref 70–110)
POCT GLUCOSE: 231 MG/DL (ref 70–110)
POIKILOCYTOSIS BLD QL SMEAR: ABNORMAL
POLYCHROMASIA BLD QL SMEAR: ABNORMAL
POTASSIUM BLD-SCNC: 4.1 MMOL/L (ref 3.5–5.1)
POTASSIUM BLD-SCNC: 4.2 MMOL/L (ref 3.5–5.1)
POTASSIUM SERPL-SCNC: 4.1 MMOL/L (ref 3.5–5.1)
POTASSIUM SERPL-SCNC: 4.2 MMOL/L (ref 3.5–5.1)
POTASSIUM SERPL-SCNC: 4.3 MMOL/L (ref 3.5–5.1)
POTASSIUM SERPL-SCNC: 4.3 MMOL/L (ref 3.5–5.1)
PROCALCITONIN SERPL IA-MCNC: 3.84 NG/ML
PROCALCITONIN SERPL IA-MCNC: 3.84 NG/ML
PROT SERPL-MCNC: 6.3 G/DL (ref 6–8.4)
PROT SERPL-MCNC: 6.4 G/DL (ref 6–8.4)
PROT SERPL-MCNC: 6.4 G/DL (ref 6–8.4)
PROTHROMBIN TIME: 14.6 SEC (ref 9–12.5)
RBC # BLD AUTO: 2.32 M/UL (ref 4.6–6.2)
SAMPLE: ABNORMAL
SAMPLE: NORMAL
SCHISTOCYTES BLD QL SMEAR: ABNORMAL
SCHISTOCYTES BLD QL SMEAR: PRESENT
SITE: ABNORMAL
SITE: NORMAL
SODIUM BLD-SCNC: 134 MMOL/L (ref 136–145)
SODIUM BLD-SCNC: 135 MMOL/L (ref 136–145)
SODIUM SERPL-SCNC: 135 MMOL/L (ref 136–145)
SODIUM SERPL-SCNC: 136 MMOL/L (ref 136–145)
SODIUM SERPL-SCNC: 136 MMOL/L (ref 136–145)
SODIUM SERPL-SCNC: 137 MMOL/L (ref 136–145)
SPECIMEN SOURCE: NORMAL
SPHEROCYTES BLD QL SMEAR: ABNORMAL
TARGETS BLD QL SMEAR: ABNORMAL
TOXIC GRANULES BLD QL SMEAR: PRESENT
VANCOMYCIN SERPL-MCNC: 8.3 UG/ML
WBC # BLD AUTO: 26.53 K/UL (ref 3.9–12.7)

## 2023-12-13 PROCEDURE — 83880 ASSAY OF NATRIURETIC PEPTIDE: CPT | Performed by: STUDENT IN AN ORGANIZED HEALTH CARE EDUCATION/TRAINING PROGRAM

## 2023-12-13 PROCEDURE — 63600175 PHARM REV CODE 636 W HCPCS: Performed by: INTERNAL MEDICINE

## 2023-12-13 PROCEDURE — 84295 ASSAY OF SERUM SODIUM: CPT

## 2023-12-13 PROCEDURE — 20000000 HC ICU ROOM

## 2023-12-13 PROCEDURE — 99232 SBSQ HOSP IP/OBS MODERATE 35: CPT | Mod: ,,, | Performed by: NURSE PRACTITIONER

## 2023-12-13 PROCEDURE — 97530 THERAPEUTIC ACTIVITIES: CPT

## 2023-12-13 PROCEDURE — 27000248 HC VAD-ADDITIONAL DAY

## 2023-12-13 PROCEDURE — 25000003 PHARM REV CODE 250: Performed by: STUDENT IN AN ORGANIZED HEALTH CARE EDUCATION/TRAINING PROGRAM

## 2023-12-13 PROCEDURE — 83615 LACTATE (LD) (LDH) ENZYME: CPT | Performed by: STUDENT IN AN ORGANIZED HEALTH CARE EDUCATION/TRAINING PROGRAM

## 2023-12-13 PROCEDURE — 25000003 PHARM REV CODE 250

## 2023-12-13 PROCEDURE — 63600175 PHARM REV CODE 636 W HCPCS: Performed by: STUDENT IN AN ORGANIZED HEALTH CARE EDUCATION/TRAINING PROGRAM

## 2023-12-13 PROCEDURE — 80202 ASSAY OF VANCOMYCIN: CPT | Performed by: THORACIC SURGERY (CARDIOTHORACIC VASCULAR SURGERY)

## 2023-12-13 PROCEDURE — 80053 COMPREHEN METABOLIC PANEL: CPT | Performed by: STUDENT IN AN ORGANIZED HEALTH CARE EDUCATION/TRAINING PROGRAM

## 2023-12-13 PROCEDURE — 99233 SBSQ HOSP IP/OBS HIGH 50: CPT | Mod: ,,, | Performed by: INTERNAL MEDICINE

## 2023-12-13 PROCEDURE — 94664 DEMO&/EVAL PT USE INHALER: CPT

## 2023-12-13 PROCEDURE — 37799 UNLISTED PX VASCULAR SURGERY: CPT

## 2023-12-13 PROCEDURE — 94761 N-INVAS EAR/PLS OXIMETRY MLT: CPT | Mod: XB

## 2023-12-13 PROCEDURE — 97110 THERAPEUTIC EXERCISES: CPT

## 2023-12-13 PROCEDURE — 25000003 PHARM REV CODE 250: Performed by: PHYSICIAN ASSISTANT

## 2023-12-13 PROCEDURE — 97535 SELF CARE MNGMENT TRAINING: CPT

## 2023-12-13 PROCEDURE — 85730 THROMBOPLASTIN TIME PARTIAL: CPT | Performed by: PHYSICIAN ASSISTANT

## 2023-12-13 PROCEDURE — 84145 PROCALCITONIN (PCT): CPT | Performed by: INTERNAL MEDICINE

## 2023-12-13 PROCEDURE — 99900035 HC TECH TIME PER 15 MIN (STAT)

## 2023-12-13 PROCEDURE — 99291 CRITICAL CARE FIRST HOUR: CPT | Mod: ,,, | Performed by: STUDENT IN AN ORGANIZED HEALTH CARE EDUCATION/TRAINING PROGRAM

## 2023-12-13 PROCEDURE — 82803 BLOOD GASES ANY COMBINATION: CPT

## 2023-12-13 PROCEDURE — 80069 RENAL FUNCTION PANEL: CPT | Performed by: STUDENT IN AN ORGANIZED HEALTH CARE EDUCATION/TRAINING PROGRAM

## 2023-12-13 PROCEDURE — 80048 BASIC METABOLIC PNL TOTAL CA: CPT | Mod: XB | Performed by: PHYSICIAN ASSISTANT

## 2023-12-13 PROCEDURE — 82800 BLOOD PH: CPT

## 2023-12-13 PROCEDURE — 63600175 PHARM REV CODE 636 W HCPCS: Performed by: THORACIC SURGERY (CARDIOTHORACIC VASCULAR SURGERY)

## 2023-12-13 PROCEDURE — 83735 ASSAY OF MAGNESIUM: CPT | Performed by: STUDENT IN AN ORGANIZED HEALTH CARE EDUCATION/TRAINING PROGRAM

## 2023-12-13 PROCEDURE — 92526 ORAL FUNCTION THERAPY: CPT

## 2023-12-13 PROCEDURE — 88741 TRANSCUTANEOUS METHB: CPT

## 2023-12-13 PROCEDURE — 94668 MNPJ CHEST WALL SBSQ: CPT

## 2023-12-13 PROCEDURE — 93750 INTERROGATION VAD IN PERSON: CPT | Mod: ,,, | Performed by: INTERNAL MEDICINE

## 2023-12-13 PROCEDURE — 84100 ASSAY OF PHOSPHORUS: CPT | Performed by: THORACIC SURGERY (CARDIOTHORACIC VASCULAR SURGERY)

## 2023-12-13 PROCEDURE — 80069 RENAL FUNCTION PANEL: CPT | Mod: 91 | Performed by: INTERNAL MEDICINE

## 2023-12-13 PROCEDURE — C9113 INJ PANTOPRAZOLE SODIUM, VIA: HCPCS | Performed by: PHYSICIAN ASSISTANT

## 2023-12-13 PROCEDURE — 82330 ASSAY OF CALCIUM: CPT | Mod: 91 | Performed by: INTERNAL MEDICINE

## 2023-12-13 PROCEDURE — 82330 ASSAY OF CALCIUM: CPT | Performed by: STUDENT IN AN ORGANIZED HEALTH CARE EDUCATION/TRAINING PROGRAM

## 2023-12-13 PROCEDURE — 85025 COMPLETE CBC W/AUTO DIFF WBC: CPT | Performed by: THORACIC SURGERY (CARDIOTHORACIC VASCULAR SURGERY)

## 2023-12-13 PROCEDURE — 84132 ASSAY OF SERUM POTASSIUM: CPT

## 2023-12-13 PROCEDURE — 80100008 HC CRRT DAILY MAINTENANCE

## 2023-12-13 PROCEDURE — 85014 HEMATOCRIT: CPT

## 2023-12-13 PROCEDURE — 94799 UNLISTED PULMONARY SVC/PX: CPT

## 2023-12-13 PROCEDURE — 90945 DIALYSIS ONE EVALUATION: CPT

## 2023-12-13 PROCEDURE — 82330 ASSAY OF CALCIUM: CPT

## 2023-12-13 PROCEDURE — 83605 ASSAY OF LACTIC ACID: CPT

## 2023-12-13 PROCEDURE — 85610 PROTHROMBIN TIME: CPT | Performed by: INTERNAL MEDICINE

## 2023-12-13 PROCEDURE — 80076 HEPATIC FUNCTION PANEL: CPT | Mod: 91 | Performed by: PHYSICIAN ASSISTANT

## 2023-12-13 PROCEDURE — 83735 ASSAY OF MAGNESIUM: CPT | Mod: 91 | Performed by: INTERNAL MEDICINE

## 2023-12-13 PROCEDURE — 27000221 HC OXYGEN, UP TO 24 HOURS

## 2023-12-13 PROCEDURE — 25000003 PHARM REV CODE 250: Performed by: INTERNAL MEDICINE

## 2023-12-13 PROCEDURE — 63600175 PHARM REV CODE 636 W HCPCS: Performed by: PHYSICIAN ASSISTANT

## 2023-12-13 PROCEDURE — 80076 HEPATIC FUNCTION PANEL: CPT | Performed by: STUDENT IN AN ORGANIZED HEALTH CARE EDUCATION/TRAINING PROGRAM

## 2023-12-13 PROCEDURE — 86140 C-REACTIVE PROTEIN: CPT | Performed by: STUDENT IN AN ORGANIZED HEALTH CARE EDUCATION/TRAINING PROGRAM

## 2023-12-13 PROCEDURE — 25000003 PHARM REV CODE 250: Performed by: THORACIC SURGERY (CARDIOTHORACIC VASCULAR SURGERY)

## 2023-12-13 PROCEDURE — 94760 N-INVAS EAR/PLS OXIMETRY 1: CPT | Mod: XB

## 2023-12-13 RX ORDER — TALC
6 POWDER (GRAM) TOPICAL NIGHTLY PRN
Status: DISCONTINUED | OUTPATIENT
Start: 2023-12-13 | End: 2023-12-28

## 2023-12-13 RX ORDER — MAGNESIUM SULFATE HEPTAHYDRATE 40 MG/ML
2 INJECTION, SOLUTION INTRAVENOUS ONCE
Status: COMPLETED | OUTPATIENT
Start: 2023-12-13 | End: 2023-12-13

## 2023-12-13 RX ADMIN — CALCIUM GLUCONATE: 98 INJECTION, SOLUTION INTRAVENOUS at 05:12

## 2023-12-13 RX ADMIN — MAGNESIUM SULFATE HEPTAHYDRATE 2 G: 40 INJECTION, SOLUTION INTRAVENOUS at 03:12

## 2023-12-13 RX ADMIN — MIDODRINE HYDROCHLORIDE 15 MG: 5 TABLET ORAL at 08:12

## 2023-12-13 RX ADMIN — MEROPENEM 2 G: 1 INJECTION INTRAVENOUS at 08:12

## 2023-12-13 RX ADMIN — HEPARIN SODIUM 12 UNITS/KG/HR: 10000 INJECTION, SOLUTION INTRAVENOUS at 11:12

## 2023-12-13 RX ADMIN — VANCOMYCIN HYDROCHLORIDE 1500 MG: 1.5 INJECTION, POWDER, LYOPHILIZED, FOR SOLUTION INTRAVENOUS at 11:12

## 2023-12-13 RX ADMIN — MAGNESIUM SULFATE HEPTAHYDRATE 2 G: 40 INJECTION, SOLUTION INTRAVENOUS at 04:12

## 2023-12-13 RX ADMIN — INSULIN ASPART 2 UNITS: 100 INJECTION, SOLUTION INTRAVENOUS; SUBCUTANEOUS at 12:12

## 2023-12-13 RX ADMIN — MEROPENEM 2 G: 1 INJECTION INTRAVENOUS at 03:12

## 2023-12-13 RX ADMIN — CALCIUM GLUCONATE: 98 INJECTION, SOLUTION INTRAVENOUS at 08:12

## 2023-12-13 RX ADMIN — MIDODRINE HYDROCHLORIDE 15 MG: 5 TABLET ORAL at 03:12

## 2023-12-13 RX ADMIN — Medication 0.04 MCG/KG/MIN: at 04:12

## 2023-12-13 RX ADMIN — CALCIUM GLUCONATE: 98 INJECTION, SOLUTION INTRAVENOUS at 11:12

## 2023-12-13 RX ADMIN — WARFARIN SODIUM 4 MG: 4 TABLET ORAL at 04:12

## 2023-12-13 RX ADMIN — INSULIN ASPART 2 UNITS: 100 INJECTION, SOLUTION INTRAVENOUS; SUBCUTANEOUS at 04:12

## 2023-12-13 RX ADMIN — AMIODARONE HYDROCHLORIDE 400 MG: 200 TABLET ORAL at 08:12

## 2023-12-13 RX ADMIN — Medication 6 MG: at 09:12

## 2023-12-13 RX ADMIN — INSULIN ASPART 4 UNITS: 100 INJECTION, SOLUTION INTRAVENOUS; SUBCUTANEOUS at 05:12

## 2023-12-13 RX ADMIN — DEXTROSE MONOHYDRATE, SODIUM CITRATE, AND CITRIC ACID MONOHYDRATE: 2.45; 2.2; .8 INJECTION, SOLUTION INTRAVENOUS at 07:12

## 2023-12-13 RX ADMIN — ATORVASTATIN CALCIUM 40 MG: 10 TABLET, FILM COATED ORAL at 08:12

## 2023-12-13 RX ADMIN — DEXTROSE MONOHYDRATE, SODIUM CITRATE, AND CITRIC ACID MONOHYDRATE: 2.45; 2.2; .8 INJECTION, SOLUTION INTRAVENOUS at 11:12

## 2023-12-13 RX ADMIN — CALCIUM GLUCONATE: 98 INJECTION, SOLUTION INTRAVENOUS at 02:12

## 2023-12-13 RX ADMIN — INSULIN ASPART 2 UNITS: 100 INJECTION, SOLUTION INTRAVENOUS; SUBCUTANEOUS at 08:12

## 2023-12-13 RX ADMIN — PANTOPRAZOLE SODIUM 40 MG: 40 INJECTION, POWDER, FOR SOLUTION INTRAVENOUS at 08:12

## 2023-12-13 RX ADMIN — SODIUM PHOSPHATE, MONOBASIC, MONOHYDRATE AND SODIUM PHOSPHATE, DIBASIC, ANHYDROUS 39.99 MMOL: 142; 276 INJECTION, SOLUTION INTRAVENOUS at 06:12

## 2023-12-13 RX ADMIN — MEROPENEM 2 G: 1 INJECTION INTRAVENOUS at 12:12

## 2023-12-13 NOTE — PT/OT/SLP PROGRESS
Occupational Therapy   Treatment    Name: Radha Abbott  MRN: [id number]  Admitting Diagnosis:  LVAD (left ventricular assist device) present  9 Days Post-Op    Recommendations:     Discharge Recommendations: High Intensity Therapy  Discharge Equipment Recommendations:   (TBD closer to d/c)  Barriers to discharge:   increased assist needed    Assessment:     Radha Abbott is a 63 y.o. male with a medical diagnosis of LVAD (left ventricular assist device) present.  He presents with the following performance deficits affecting function are weakness, impaired self care skills, impaired balance, decreased coordination, decreased safety awareness, impaired endurance, impaired functional mobility, decreased upper extremity function, pain, gait instability, decreased lower extremity function, impaired fine motor, impaired cardiopulmonary response to activity.     Rehab Prognosis:  Good; patient would benefit from acute skilled OT services to address these deficits and reach maximum level of function.       Plan:     Patient to be seen 6 x/week to address the above listed problems via self-care/home management, therapeutic activities, therapeutic exercises, neuromuscular re-education  Plan of Care Expires: 01/02/24  Plan of Care Reviewed with: patient, spouse    Subjective     Chief Complaint: NA  Patient/Family Comments/goals: Participate with therapy  Pain/Comfort:  Pain Rating 1: 0/10  Pain Rating Post-Intervention 1: 0/10    Objective:     Communicated with: Nursing prior to session.  Patient found HOB elevated with   upon OT entry to room.    General Precautions: Standard, fall, LVAD, sternal    Orthopedic Precautions:N/A  Braces: N/A  Respiratory Status: Nasal cannula, flow 5 L/min and nitric oxide     Occupational Performance:     Bed Mobility:    Patient completed Scooting/Bridging with total assistance and 2 persons  Patient completed Supine to Sit with moderate assistance and 2 persons  Patient completed Sit to Supine  with total assistance and 2 persons     Functional Mobility/Transfers:  Patient sat edge of bed x ~15 minutes; required mod-max v/t cues for placement of BUE's  Posterolateral lean to L  Posterior pelvic tilt and cervical flexion, min-mod A to correct; able to maintain ~30 sec bouts once assisted into posture  MAP to 57 in last few minutes of sitting, nursing present and patient assisted to return to supine    Activities of Daily Living:  Grooming: contact guard assistance seated edge of bed  Lower Body Dressing: total assistance seated edge of bed    AMPAC 6 Click ADL:      Treatment & Education:  -Patient and family educated on roles/goals of OT and POC.  -White board updated.  -Therapist provided time for questions and answered within scope of practice.  -Patient educated on importance of EOB/OOB activity to maximize recovery.    Patient left HOB elevated with all lines intact, call button in reach, and nursing notified    GOALS:   Multidisciplinary Problems       Occupational Therapy Goals          Problem: Occupational Therapy    Goal Priority Disciplines Outcome Interventions   Occupational Therapy Goal     OT, PT/OT Ongoing, Progressing    Description: Goals to be met by: 1/2/24     Patient will increase functional independence with ADLs by performing:    UB Dressing with SBA  LE Dressing with Supervision.  Grooming while standing at sink with Supervision.  Toileting from bedside commode with SBA for hygiene and clothing management.   Step transfer to bedside commode with SBA  [place] with VAD management during ADLs                             Time Tracking:     OT Date of Treatment:  12/13/23  OT Start Time:  1023  OT Stop Time:   1046  OT Total Time (min):  23 min    Billable Minutes:Self Care/Home Management 13  Therapeutic Activity 10               12/13/2023

## 2023-12-13 NOTE — ASSESSMENT & PLAN NOTE
IVÁN on CKD2. Baseline Cr of 1.7-2.0.   - Hold ARNi, entresto  -Trend BMPs daily   -SrCr 1.1/BUN 13 today  -SLED/SCUF for volume removal   -Nephrology following , will take a break from CRRT   -Midodrine 15 mg TID

## 2023-12-13 NOTE — PROGRESS NOTES
VAD dsg change performed per RN using sterile technique. Driveline insertion site CDI. Pt wife observed dsg change. RN showed wife how to don sterile gloves and gave pt wife 2 pairs to practice with.

## 2023-12-13 NOTE — ASSESSMENT & PLAN NOTE
-HeartMate 3 Implanted  12/1/2023  as DT  -HTS Primary  -Implanted by Dr. Washington  -HOLD Coumadin,  Goal INR 2.0-3.0 . Subtherapeutic today. On Heparin gtt   -Antiplatelets Not on ASA  -LDH is stable overall today. Will continue to monitor daily.  -Speed set at  4900   rpm, LSL 4500 rpm   -Interrogation notable for no events  -Not listed for OHTx, declined for OHTX due to comorbidities         Procedure: Device Interrogation Including analysis of device parameters  Current Settings: Ventricular Assist Device  Review of device function is stable/unstable stable        12/13/2023     2:00 PM 12/13/2023     1:00 PM 12/13/2023    12:00 PM 12/13/2023    11:00 AM 12/13/2023    10:00 AM 12/13/2023     9:00 AM 12/13/2023     8:00 AM   TXP LVAD INTERROGATIONS   Type HeartMate3 HeartMate3 HeartMate3 HeartMate3 HeartMate3 HeartMate3 HeartMate3   Flow 3.8 4 4.1 4.2 4 4.2 4   Speed 4900 4900 4900 4900 4900 4900 4900   PI 4.3 4.1 4.1 4 4.7 4.2 4.4   Power ([place]) 3.2 3.3 3.5 3.4 3.3 3.3 3.2   LSL 4500 4500 4500 4500 4500 4500 4500

## 2023-12-13 NOTE — ASSESSMENT & PLAN NOTE
BG goal 140-180.      - BG checks q4hr while on TF/NPO   - Novolog (aspart) insulin prn for BG excursions MDC (150/25)   - Hypoglycemia protocol in place    ** Please notify Endocrine for any change and/or advance in diet**  ** Please call Endocrine for any BG related issues **    Discharge Planning:   TBD. Please notify endocrinology prior to discharge.

## 2023-12-13 NOTE — SUBJECTIVE & OBJECTIVE
Interval History: Improving WBC this morning. Cx data so far unrevealing. Working with SLP this morning.     Review of Systems   Constitutional:  Negative for chills and fever.   Respiratory:  Positive for cough. Negative for shortness of breath.    Gastrointestinal:  Negative for abdominal pain.     Objective:     Vital Signs (Most Recent):  Temp: 97.7 °F (36.5 °C) (12/13/23 0300)  Pulse: 93 (12/13/23 0630)  Resp: 15 (12/12/23 2100)  BP: (!) 72/0 (12/12/23 2300)  SpO2: 97 % (12/13/23 0444) Vital Signs (24h Range):  Temp:  [97.6 °F (36.4 °C)-98 °F (36.7 °C)] 97.7 °F (36.5 °C)  Pulse:  [] 93  Resp:  [15] 15  SpO2:  [97 %-99 %] 97 %  BP: (72-74)/(0) 72/0  Arterial Line BP: ()/(56-94) 77/60     Weight: 78.5 kg (173 lb)  Body mass index is 23.46 kg/m².    Estimated Creatinine Clearance: 141.9 mL/min (based on SCr of 0.6 mg/dL).     Physical Exam  Constitutional:       General: He is not in acute distress.  HENT:      Right Ear: External ear normal.      Left Ear: External ear normal.      Nose:      Comments: NGT  Eyes:      General:         Right eye: No discharge.         Left eye: No discharge.   Pulmonary:      Effort: Pulmonary effort is normal. No respiratory distress.   Abdominal:      Comments: Abdominal dressing   Musculoskeletal:      Right lower leg: No edema.      Left lower leg: No edema.   Skin:     General: Skin is warm and dry.      Comments: R picc  RIJ       Neurological:      Mental Status: He is alert.          Significant Labs:   Microbiology Results (last 7 days)       Procedure Component Value Units Date/Time    Blood culture [[id number]] Collected: 12/10/23 1149    Order Status: Completed Specimen: Blood from Line, Jugular, External Right Updated: 12/12/23 2012     Blood Culture, Routine No Growth to date      No Growth to date      No Growth to date    Blood culture [[id number]] Collected: 12/10/23 1240    Order Status: Completed Specimen: Blood from Peripheral, Antecubital, Right  Updated: 12/12/23 2012     Blood Culture, Routine No Growth to date      No Growth to date      No Growth to date    Culture, Respiratory with Gram Stain [[id number]] Collected: 12/11/23 1643    Order Status: Completed Specimen: Respiratory from Sputum, Expectorated Updated: 12/12/23 1052     Respiratory Culture Insufficient incubation, culture in progress     Gram Stain (Respiratory) <10 epithelial cells per low power field.     Gram Stain (Respiratory) Rare WBC's     Gram Stain (Respiratory) Rare Gram positive cocci    Fungus culture [[id number]] Collected: 12/06/23 1532    Order Status: Completed Specimen: Body Fluid from Lung, Right Updated: 12/12/23 0915     Fungus (Mycology) Culture Culture in progress    Narrative:      Bronchial Wash    Clostridium difficile EIA [[id number]] Collected: 12/12/23 0110    Order Status: Completed Specimen: Stool Updated: 12/12/23 0418     C. diff Antigen Negative     C difficile Toxins A+B, EIA Negative     Comment: Testing not recommended for children <24 months old.       Culture, Respiratory [[id number]] Collected: 12/06/23 1531    Order Status: Completed Specimen: Respiratory from Bronchial Wash Updated: 12/08/23 1103     Respiratory Culture No growth     Gram Stain (Respiratory) Moderate WBC's     Gram Stain (Respiratory) Rare Gram negative rods    Narrative:      Bronchial Wash    AFB Culture & Smear [[id number]] Collected: 12/06/23 1532    Order Status: Completed Specimen: Body Fluid from Lung, Right Updated: 12/07/23 2127     AFB Culture & Smear Culture in progress     AFB CULTURE STAIN No acid fast bacilli seen.    Narrative:      Bronchial Wash    Direct AFB stain [[id number]] Collected: 12/06/23 1532    Order Status: Completed Specimen: Body Fluid from Lung, Right Updated: 12/06/23 2257     Direct Acid Fast No acid fast bacilli seen.    Narrative:      Bronchial Wash    Gram stain [[id number]] Collected: 12/06/23 1532    Order Status: Canceled Specimen: Body Fluid  from Lung, Right             Significant Imaging: I have reviewed all pertinent imaging results/findings within the past 24 hours.

## 2023-12-13 NOTE — CARE UPDATE
HTS Hemodynamics:   Parameter   at 2000   CVP 4   SvO2    CO     CI    SVR    MAP 73     Current Heart Failure Medications:  - Epinephrine 0.04 mcg/kg/min    Current Mechanical Circulatory Support:  LVAD HM3 4900 RPM, on heparin gtt    I/Os: SLED, tolerating     Intake/Output Summary (Last 24 hours) at 12/12/2023 2120  Last data filed at 12/12/2023 2100  Gross per 24 hour   Intake 7628.59 ml   Output 8088 ml   Net -459.41 ml     Assessment/Plan:  - Plan was discussed with on-call attending    Reginald Garcia MD  Cardiovascular Disease PGY-V  [facility]

## 2023-12-13 NOTE — PROGRESS NOTES
[facility] - [facility]  Nephrology  Progress Note    Patient Name: Radha Abbott  MRN: [id number]  Admission Date: 11/9/2023  Hospital Length of Stay: 34 days  Attending Provider: Sandhya Price MD   Primary Care Physician: Vasu Kong MD  Principal Problem:LVAD (left ventricular assist device) present    Subjective:     HPI: Mr. Abbott is a 63-year-old man with ischemic cardiomyopathy with most recent TTE showing EF 10 -15% and G3DD s/p [employer] ICM placement on chronic dobutamine infusion, CAD s/p x3 vessel CABG back in 2009, type 2 diabetes mellitus (most recent hemoglobin A1c 7.6%), hypertension, HLD, history of ventricular fibrillation for which he is on amiodarone and CKD IIIb (baseline creatinine ~2-2.2) who was initially admitted on 11/9 with heart failure exacerbation and hypervolemia for which he was managed with inotrope with dobutamine in addition to IV diuresis. He ultimately underwent IABP placed on 11/21 for mechanical hemodynamic support and subsequently underwent LVAD placement on 12/1. His renal function appears to be mostly stable with IVÁN and creatinine in mid 2s to 3s in addition he is still making urine with Lasix infusion however on 12/5 Nephrology consulted given concern for uremia with BUN 72.     Interval History: Patient seen and examined this AM. Wife at bedside. Afebrile with pulse ranging from 90-80s bpm. Systolic blood pressures ranging from 80-70s mmHg via arterial line (just on epinephrine 0.04). BNP 1.5K from 2.2K with no documented UOP and he is net negative ~1 liter in the last 24 hours. He is saturating 97% on 5 liters (now 3 liters) of nasal cannula with no documented UOP in the last 24 hours. Net positive ~300 mL. Calcium ratio 1.94. He is midodrine on 15 mg TID. After discussion with primary team plan for break from SLED today.    Review of patient's allergies indicates:  No Known Allergies  Current Facility-Administered Medications   Medication Frequency     0.9%  NaCl infusion Continuous    acetaminophen tablet 650 mg Q6H PRN    albuterol sulfate nebulizer solution 2.5 mg Q4H PRN    amiodarone tablet 400 mg Daily    atorvastatin tablet 40 mg Daily    bisacodyL suppository 10 mg Daily PRN    calcium gluconate 3 g in dextrose 5 % (D5W) 100 mL infusion Continuous    dextrose 10 % infusion Continuous PRN    dextrose 10% bolus 125 mL 125 mL PRN    dextrose 10% bolus 250 mL 250 mL PRN    dextrose-sod citrate-citric ac 2.45-2.2 gram- 800 mg/100 mL Soln Continuous    EPINEPHrine 5 mg in dextrose 5% 250 mL infusion (premix) Continuous    glucagon (human recombinant) injection 1 mg PRN    heparin 25,000 units in dextrose 5% 250 ml (100 units/mL) infusion MINIMAL INTENSITY nomogram - [facility] Continuous    insulin aspart U-100 pen 0-10 Units Q4H PRN    magnesium hydroxide 400 mg/5 ml suspension 2,400 mg Daily PRN    magnesium sulfate 2g in water 50mL IVPB (premix) PRN    meropenem (MERREM) 2 g in sodium chloride 0.9% 100 mL IVPB Q8H    micafungin 100 mg in sodium chloride 0.9 % 100 mL IVPB (MB+) Q24H    midodrine tablet 15 mg TID    pantoprazole injection 40 mg Daily    polyethylene glycol packet 17 g Daily    potassium chloride 20 mEq in 100 mL IVPB (FOR CENTRAL LINE ADMINISTRATION ONLY) PRN    potassium chloride 20 mEq in 100 mL IVPB (FOR CENTRAL LINE ADMINISTRATION ONLY) PRN    QUEtiapine tablet 50 mg Q6H PRN    senna-docusate 8.6-50 mg per tablet 1 tablet Daily    sodium chloride 0.9% flush 10 mL PRN    sodium phosphate 20.01 mmol in dextrose 5 % (D5W) 250 mL IVPB PRN    sodium phosphate 30 mmol in dextrose 5 % (D5W) 250 mL IVPB PRN    sodium phosphate 39.99 mmol in dextrose 5 % (D5W) 250 mL IVPB PRN    vancomycin - pharmacy to dose pharmacy to manage frequency    vasopressin (PITRESSIN) 0.2 Units/mL in dextrose 5 % (D5W) 100 mL infusion Continuous    warfarin (COUMADIN) tablet 4 mg Daily       Objective:     Vital Signs (Most Recent):  Temp: 97.7 °F (36.5 °C) (12/13/23  0300)  Pulse: 93 (12/13/23 0630)  Resp: 15 (12/12/23 2100)  BP: (!) 72/0 (12/12/23 2300)  SpO2: 97 % (12/13/23 0444) Vital Signs (24h Range):  Temp:  [97.6 °F (36.4 °C)-98 °F (36.7 °C)] 97.7 °F (36.5 °C)  Pulse:  [] 93  Resp:  [15] 15  SpO2:  [97 %-99 %] 97 %  BP: (72-74)/(0) 72/0  Arterial Line BP: ()/(56-94) 77/60     Weight: 78.5 kg (173 lb) (12/13/23 0228)  Body mass index is 23.46 kg/m².  Body surface area is 2 meters squared.    I/O last 3 completed shifts:  In: 43096.4 [I.V.:2577.6; NG/GT:195; IV Piggyback:8499.8]  Out: 63374 [Other:74150]    Physical Exam  Vitals and nursing note reviewed.   Constitutional:       General: He is sleeping. He is not in acute distress.     Appearance: He is ill-appearing. He is not diaphoretic.      Interventions: Nasal cannula in place.   HENT:      Head: Normocephalic and atraumatic.      Right Ear: External ear normal.      Left Ear: External ear normal.      Nose:      Comments: NG tube to left nostril and nasal cannula in place.     Mouth/Throat:      Mouth: Mucous membranes are moist.      Pharynx: Oropharynx is clear. No oropharyngeal exudate or posterior oropharyngeal erythema.   Eyes:      General: No scleral icterus.        Right eye: No discharge.         Left eye: No discharge.      Extraocular Movements: Extraocular movements intact.      Conjunctiva/sclera: Conjunctivae normal.   Neck:      Comments: Trialysis catheter to left internal jugular vein.   Cardiovascular:      Rate and Rhythm: Normal rate.      Heart sounds: Murmur (humming sound, VAD) heard.      Systolic murmur is present.      No friction rub. No gallop.   Pulmonary:      Breath sounds: Rhonchi present. No wheezing or rales.   Chest:      Comments: LVAD present.   Abdominal:      General: Bowel sounds are normal. There is no distension.      Palpations: Abdomen is soft.   Musculoskeletal:      Cervical back: Neck supple.      Right lower leg: No edema.      Left lower leg: No edema.    Skin:     General: Skin is warm and dry.      Coloration: Skin is not jaundiced.          Significant Labs:  ABGs:   Recent Labs   Lab 12/12/23  2340   PH 7.447   PCO2 27.6*   HCO3 19.0*   POCSATURATED 99   BE -5*       BMP:   Recent Labs   Lab 12/13/23  0316   *  182*     136   K 4.3  4.2     105   CO2 19*  20*   BUN 6*  6*   CREATININE 0.6  0.6   CALCIUM 8.4*  8.4*   MG 1.8       CBC:   Recent Labs   Lab 12/13/23 0316   WBC 26.53*   RBC 2.32*   HGB 6.8*   HCT 21.0*      MCV 91   MCH 29.3   MCHC 32.4       CMP:   Recent Labs   Lab 12/13/23 0316   *  182*   CALCIUM 8.4*  8.4*   ALBUMIN 2.1*  2.1*  2.1*   PROT 6.4  6.4     136   K 4.3  4.2   CO2 19*  20*     105   BUN 6*  6*   CREATININE 0.6  0.6   ALKPHOS 171*  173*   ALT 24  24   AST 43*  43*   BILITOT 1.6*  1.6*       Coagulation:   Recent Labs   Lab 12/12/23  0607 12/12/23  1719 12/13/23 0316   INR 1.2  --   --    APTT  --    < > 41.2*    < > = values in this interval not displayed.       LFTs:   Recent Labs   Lab 12/13/23 0316   ALT 24  24   AST 43*  43*   ALKPHOS 171*  173*   BILITOT 1.6*  1.6*   PROT 6.4  6.4   ALBUMIN 2.1*  2.1*  2.1*       Microbiology Results (last 7 days)       Procedure Component Value Units Date/Time    Blood culture [[id number]] Collected: 12/10/23 1149    Order Status: Completed Specimen: Blood from Line, Jugular, External Right Updated: 12/12/23 2012     Blood Culture, Routine No Growth to date      No Growth to date      No Growth to date    Blood culture [[id number]] Collected: 12/10/23 1240    Order Status: Completed Specimen: Blood from Peripheral, Antecubital, Right Updated: 12/12/23 2012     Blood Culture, Routine No Growth to date      No Growth to date      No Growth to date    Culture, Respiratory with Gram Stain [[id number]] Collected: 12/11/23 1640    Order Status: Completed Specimen: Respiratory from Sputum, Expectorated Updated: 12/12/23 1055      Respiratory Culture Insufficient incubation, culture in progress     Gram Stain (Respiratory) <10 epithelial cells per low power field.     Gram Stain (Respiratory) Rare WBC's     Gram Stain (Respiratory) Rare Gram positive cocci    Fungus culture [[id number]] Collected: 12/06/23 1532    Order Status: Completed Specimen: Body Fluid from Lung, Right Updated: 12/12/23 0915     Fungus (Mycology) Culture Culture in progress    Narrative:      Bronchial Wash    Clostridium difficile EIA [[id number]] Collected: 12/12/23 0110    Order Status: Completed Specimen: Stool Updated: 12/12/23 0418     C. diff Antigen Negative     C difficile Toxins A+B, EIA Negative     Comment: Testing not recommended for children <24 months old.       Culture, Respiratory [[id number]] Collected: 12/06/23 1531    Order Status: Completed Specimen: Respiratory from Bronchial Wash Updated: 12/08/23 1103     Respiratory Culture No growth     Gram Stain (Respiratory) Moderate WBC's     Gram Stain (Respiratory) Rare Gram negative rods    Narrative:      Bronchial Wash    AFB Culture & Smear [[id number]] Collected: 12/06/23 1532    Order Status: Completed Specimen: Body Fluid from Lung, Right Updated: 12/07/23 2127     AFB Culture & Smear Culture in progress     AFB CULTURE STAIN No acid fast bacilli seen.    Narrative:      Bronchial Wash    Direct AFB stain [[id number]] Collected: 12/06/23 1532    Order Status: Completed Specimen: Body Fluid from Lung, Right Updated: 12/06/23 2257     Direct Acid Fast No acid fast bacilli seen.    Narrative:      Bronchial Wash    Gram stain [[id number]] Collected: 12/06/23 1532    Order Status: Canceled Specimen: Body Fluid from Lung, Right           Specimen (24h ago, onward)      None          Significant Imaging:  I have reviewed all imagining in the last 24 hours.  Assessment/Plan:     Cardiac/Vascular  * LVAD (left ventricular assist device) present  Pre-transplant evaluation for heart transplant  Acute on  chronic combined systolic and diastolic CHF, NYHA class 4  Patient is identified as having Combined Systolic and Diastolic heart failure that is Acute on chronic. CHF is currently uncontrolled due to JVD, Rales/crackles on pulmonary exam, and Pulmonary edema/pleural effusion on CXR. Latest ECHO performed and demonstrates- Results for orders placed during the hospital encounter of 11/09/23    Echo    Interpretation Summary    Left Ventricle: The left ventricle is moderately dilated. Mildly increased ventricular mass. Normal wall thickness. There is eccentric hypertrophy. Severe global hyperkinesis present. There is severely reduced systolic function with a visually estimated ejection fraction of 10 -15%. Grade III diastolic dysfunction.    Right Ventricle: Normal right ventricular cavity size. Systolic function is normal. Pacemaker lead present in the ventricle.    Left Atrium: Left atrium is severely dilated.    Mitral Valve: There is annular and bileaflet tethering. There is moderate regurgitation with a centrally directed jet.    Tricuspid Valve: There is mild regurgitation with a centrally directed jet.    Pulmonary Artery: The estimated pulmonary artery systolic pressure is 41 mmHg.    IVC/SVC: Normal venous pressure at 3 mmHg.    last BNP reviewed- and noted below   Recent Labs   Lab 12/13/23  0316   BNP 1,460*       - management per primary team  - LVAD for mechanical support and epinephrine for ionotropic support    Renal/  Acute renal failure with acute tubular necrosis superimposed on stage 3b chronic kidney disease  - suspect acute tubular injury secondary to hypotension/cardiogenic shock likely compounded by intra-arterial contrast and anemia with urinary sediment with granular casts  - break from RRT today while monitoring UOP and renal function panels, will reassess tomorrow for indications to resume RRT  - recommend serial metabolic panels at least BID/Q12H in the interim  - renal diet/tube feeds  when not NPO, volume restriction per primary team  - strict I/O's and daily weights  - keep MAP > 65 mmHg  - renally all dose medications to eGFR  - avoid nephrotoxic agents wean feasible (i.e. NSAIDs, intra-arterial contrast, supra-therapeutic vancomycin levels, etc.)    Thank you for your consult. I will follow-up with patient. Please contact us if you have any additional questions.    Henok Ayon MD  Nephrology  [facility]    ATTENDING PHYSICIAN ATTESTATION  I have personally verified the history and examined the patient. I thoroughly reviewed the demographic, clinical, laboratorial and imaging information available in medical records. I agree with the assessment and recommendations provided by the subspecialty resident who was under my supervision.

## 2023-12-13 NOTE — PROGRESS NOTES
Pharmacokinetic Assessment Follow Up: IV Vancomycin    Vancomycin serum concentration assessment/plan:  Random level resulted as 8.3 mcg/mL; Goal 10-20 mcg/mL, Sepsis  Patient on continuous SLED.   Re-dose Vancomycin 1500 mg IV x 1; continue to dose by level and RRT plans per nephrology.   Next level to be drawn on 12/14/23 with AM labs.     Drug levels (last 3 results):  Recent Labs   Lab Result Units 12/11/23  0502 12/12/23  0411 12/13/23  0316   Vancomycin, Random ug/mL 14.1 10.5 8.3       Pharmacy will continue to follow and monitor vancomycin.    Please contact pharmacy at extension [phone] for questions regarding this assessment.    Thank you for the consult,   Toma He       Patient brief summary:  Radha Abbott is a 63 y.o. male initiated on antimicrobial therapy with IV Vancomycin for treatment of sepsis    The patient's current regimen is dosing based on level.     Drug Allergies:   Review of patient's allergies indicates:  No Known Allergies    Actual Body Weight:   78.5 kg     Renal Function:   Estimated Creatinine Clearance: 141.9 mL/min (based on SCr of 0.6 mg/dL).,     Dialysis Method (if applicable):  SLED    CBC (last 72 hours):  Recent Labs   Lab Result Units 12/11/23  0405 12/12/23  0411 12/13/23  0316   WBC K/uL 27.70* 33.19* 26.53*   Hemoglobin g/dL 7.7* 7.2* 6.8*   Hematocrit % 23.2* 21.8* 21.0*   Platelets K/uL 287 329 358   Gran % % 88.0* 88.7* 89.1*   Lymph % % 3.6* 1.8* 2.0*   Mono % % 5.0 4.6 5.2   Eosinophil % % 0.2 0.0 0.2   Basophil % % 0.3 0.3 0.2   Differential Method  Automated Automated Automated       Metabolic Panel (last 72 hours):  Recent Labs   Lab Result Units 12/10/23  1127 12/10/23  1342 12/10/23  1715 12/10/23  2204 12/11/23  0405 12/11/23  1151 12/11/23  1418 12/11/23  1724 12/11/23  2159 12/12/23  0411 12/12/23  1412 12/12/23  2159 12/13/23  0316   Sodium mmol/L  --  138  138  --  134* 135*  135*  136  --  137  --  134* 136  136 135* 136 136  136   Potassium  mmol/L 4.4 4.5  4.5 4.4 3.9 4.2  4.2  4.1 4.2 4.5 4.9 4.4 4.5  4.5 4.4 4.2 4.3  4.2   Chloride mmol/L  --  104  104  --  103 103  103  103  --  102  --  103 104  105 103 103 105  105   CO2 mmol/L  --  22*  22*  --  18* 20*  20*  20*  --  24  --  20* 17*  17* 19* 18* 19*  20*   Glucose mg/dL  --  161*  161*  --  230* 199*  199*  199*  --  146*  --  157* 187*  187* 178* 199* 180*  182*   BUN mg/dL  --  5*  5*  --  7* 7*  7*  8  --  10  --  7* 8  7* 8 6* 6*  6*   Creatinine mg/dL  --  0.7  0.7  --  0.7 0.8  0.8  0.8  --  0.9  --  0.7 0.7  0.7 0.6 0.5 0.6  0.6   Albumin g/dL  --  2.2*  2.2*  --  2.0* 2.1*  2.1*  2.1*  2.1*  --  2.0*  --  2.0* 2.1*  2.1* 2.1* 2.0* 2.1*  2.1*  2.1*   Total Bilirubin mg/dL  --   --   --   --  1.3*  1.4*  --   --   --   --  1.7*  --   --  1.6*  1.6*   Alkaline Phosphatase U/L  --   --   --   --  174*  174*  --   --   --   --  183*  --   --  171*  173*   AST U/L  --   --   --   --  47*  47*  --   --   --   --  44*  --   --  43*  43*   ALT U/L  --   --   --   --  27  27  --   --   --   --  25  --   --  24  24   Magnesium mg/dL 2.1 2.0  2.0 1.9 1.8 2.0 1.9 1.9 1.8 2.0 1.9 1.9 2.1 1.8   Phosphorus mg/dL  --  1.2*  1.2*  --  2.1* 2.5*  2.5*  2.5*  --  1.4*  --  2.2* 2.4*  2.3* 2.1* 2.9 1.4*  1.4*       Vancomycin Administrations:  vancomycin given in the last 96 hours                     vancomycin 1,500 mg in dextrose 5 % (D5W) 250 mL IVPB (Vial-Mate) (mg) 1,500 mg New Bag 12/12/23 0935    vancomycin 1,250 mg in dextrose 5 % (D5W) 250 mL IVPB (Vial-Mate) (mg) 1,250 mg New Bag 12/11/23 0912    vancomycin 1,500 mg in dextrose 5 % (D5W) 250 mL IVPB (Vial-Mate) (mg) 1,500 mg New Bag 12/10/23 2021                    Microbiologic Results:  Microbiology Results (last 7 days)       Procedure Component Value Units Date/Time    Culture, Respiratory with Gram Stain [[id number]] Collected: 12/11/23 1643    Order Status: Completed Specimen: Respiratory  from Sputum, Expectorated Updated: 12/13/23 0954     Respiratory Culture Normal respiratory bobby      No S aureus or Pseudomonas isolated.     Gram Stain (Respiratory) <10 epithelial cells per low power field.     Gram Stain (Respiratory) Rare WBC's     Gram Stain (Respiratory) Rare Gram positive cocci    Blood culture [[id number]] Collected: 12/10/23 1149    Order Status: Completed Specimen: Blood from Line, Jugular, External Right Updated: 12/12/23 2012     Blood Culture, Routine No Growth to date      No Growth to date      No Growth to date    Blood culture [[id number]] Collected: 12/10/23 1240    Order Status: Completed Specimen: Blood from Peripheral, Antecubital, Right Updated: 12/12/23 2012     Blood Culture, Routine No Growth to date      No Growth to date      No Growth to date    Fungus culture [[id number]] Collected: 12/06/23 1532    Order Status: Completed Specimen: Body Fluid from Lung, Right Updated: 12/12/23 0915     Fungus (Mycology) Culture Culture in progress    Narrative:      Bronchial Wash    Clostridium difficile EIA [[id number]] Collected: 12/12/23 0110    Order Status: Completed Specimen: Stool Updated: 12/12/23 0418     C. diff Antigen Negative     C difficile Toxins A+B, EIA Negative     Comment: Testing not recommended for children <24 months old.       Culture, Respiratory [[id number]] Collected: 12/06/23 1531    Order Status: Completed Specimen: Respiratory from Bronchial Wash Updated: 12/08/23 1103     Respiratory Culture No growth     Gram Stain (Respiratory) Moderate WBC's     Gram Stain (Respiratory) Rare Gram negative rods    Narrative:      Bronchial Wash    AFB Culture & Smear [[id number]] Collected: 12/06/23 1532    Order Status: Completed Specimen: Body Fluid from Lung, Right Updated: 12/07/23 2127     AFB Culture & Smear Culture in progress     AFB CULTURE STAIN No acid fast bacilli seen.    Narrative:      Bronchial Wash    Direct AFB stain [[id number]] Collected: 12/06/23  1532    Order Status: Completed Specimen: Body Fluid from Lung, Right Updated: 12/06/23 2257     Direct Acid Fast No acid fast bacilli seen.    Narrative:      Bronchial Wash    Gram stain [[id number]] Collected: 12/06/23 1532    Order Status: Canceled Specimen: Body Fluid from Lung, Right

## 2023-12-13 NOTE — PROGRESS NOTES
12/13/23 0123   Treatment   Treatment Type SLED   Treatment Status Daily equipment check   Dialysis Machine Number [id number]   Dialyzer Time (hours) 35.29   BVP (Liters) 304.2 L   Solutions Labeled and Current  Yes   Access Temporary Cath   Catheter Dressing Intact  Yes   Alarms Engaged Yes   CRRT Comments daily check done

## 2023-12-13 NOTE — SUBJECTIVE & OBJECTIVE
Interval History: Patient seen and examined this AM. Wife at bedside. Afebrile with pulse ranging from 90-80s bpm. Systolic blood pressures ranging from 80-70s mmHg via arterial line (just on epinephrine 0.04). BNP 1.5K from 2.2K with no documented UOP and he is net negative ~1 liter in the last 24 hours. He is saturating 97% on 5 liters (now 3 liters) of nasal cannula with no documented UOP in the last 24 hours. Net positive ~300 mL. Calcium ratio 1.94. He is midodrine on 15 mg TID. After discussion with primary team plan for break from [facility] today.    Review of patient's allergies indicates:  No Known Allergies  Current Facility-Administered Medications   Medication Frequency    0.9%  NaCl infusion Continuous    acetaminophen tablet 650 mg Q6H PRN    albuterol sulfate nebulizer solution 2.5 mg Q4H PRN    amiodarone tablet 400 mg Daily    atorvastatin tablet 40 mg Daily    bisacodyL suppository 10 mg Daily PRN    calcium gluconate 3 g in dextrose 5 % (D5W) 100 mL infusion Continuous    dextrose 10 % infusion Continuous PRN    dextrose 10% bolus 125 mL 125 mL PRN    dextrose 10% bolus 250 mL 250 mL PRN    dextrose-sod citrate-citric ac 2.45-2.2 gram- 800 mg/100 mL Soln Continuous    EPINEPHrine 5 mg in dextrose 5% 250 mL infusion (premix) Continuous    glucagon (human recombinant) injection 1 mg PRN    heparin 25,000 units in dextrose 5% 250 ml (100 units/mL) infusion MINIMAL INTENSITY nomogram - [facility] Continuous    insulin aspart U-100 pen 0-10 Units Q4H PRN    magnesium hydroxide 400 mg/5 ml suspension 2,400 mg Daily PRN    magnesium sulfate 2g in water 50mL IVPB (premix) PRN    meropenem (MERREM) 2 g in sodium chloride 0.9% 100 mL IVPB Q8H    micafungin 100 mg in sodium chloride 0.9 % 100 mL IVPB (MB+) Q24H    midodrine tablet 15 mg TID    pantoprazole injection 40 mg Daily    polyethylene glycol packet 17 g Daily    potassium chloride 20 mEq in 100 mL IVPB (FOR CENTRAL LINE ADMINISTRATION ONLY) PRN    potassium  chloride 20 mEq in 100 mL IVPB (FOR CENTRAL LINE ADMINISTRATION ONLY) PRN    QUEtiapine tablet 50 mg Q6H PRN    senna-docusate 8.6-50 mg per tablet 1 tablet Daily    sodium chloride 0.9% flush 10 mL PRN    sodium phosphate 20.01 mmol in dextrose 5 % (D5W) 250 mL IVPB PRN    sodium phosphate 30 mmol in dextrose 5 % (D5W) 250 mL IVPB PRN    sodium phosphate 39.99 mmol in dextrose 5 % (D5W) 250 mL IVPB PRN    vancomycin - pharmacy to dose pharmacy to manage frequency    vasopressin (PITRESSIN) 0.2 Units/mL in dextrose 5 % (D5W) 100 mL infusion Continuous    warfarin (COUMADIN) tablet 4 mg Daily       Objective:     Vital Signs (Most Recent):  Temp: 97.7 °F (36.5 °C) (12/13/23 0300)  Pulse: 93 (12/13/23 0630)  Resp: 15 (12/12/23 2100)  BP: (!) 72/0 (12/12/23 2300)  SpO2: 97 % (12/13/23 0444) Vital Signs (24h Range):  Temp:  [97.6 °F (36.4 °C)-98 °F (36.7 °C)] 97.7 °F (36.5 °C)  Pulse:  [] 93  Resp:  [15] 15  SpO2:  [97 %-99 %] 97 %  BP: (72-74)/(0) 72/0  Arterial Line BP: ()/(56-94) 77/60     Weight: 78.5 kg (173 lb) (12/13/23 0228)  Body mass index is 23.46 kg/m².  Body surface area is 2 meters squared.    I/O last 3 completed shifts:  In: 17032.4 [I.V.:2577.6; NG/GT:195; IV Piggyback:8499.8]  Out: 05826 [Other:41795]     Physical Exam  Vitals and nursing note reviewed.   Constitutional:       General: He is sleeping. He is not in acute distress.     Appearance: He is ill-appearing. He is not diaphoretic.      Interventions: Nasal cannula in place.   HENT:      Head: Normocephalic and atraumatic.      Right Ear: External ear normal.      Left Ear: External ear normal.      Nose:      Comments: NG tube to left nostril and nasal cannula in place.     Mouth/Throat:      Mouth: Mucous membranes are moist.      Pharynx: Oropharynx is clear. No oropharyngeal exudate or posterior oropharyngeal erythema.   Eyes:      General: No scleral icterus.        Right eye: No discharge.         Left eye: No discharge.       Extraocular Movements: Extraocular movements intact.      Conjunctiva/sclera: Conjunctivae normal.   Neck:      Comments: Trialysis catheter to left internal jugular vein.   Cardiovascular:      Rate and Rhythm: Normal rate.      Heart sounds: Murmur (humming sound, VAD) heard.      Systolic murmur is present.      No friction rub. No gallop.   Pulmonary:      Breath sounds: Rhonchi present. No wheezing or rales.   Chest:      Comments: LVAD present.   Abdominal:      General: Bowel sounds are normal. There is no distension.      Palpations: Abdomen is soft.   Musculoskeletal:      Cervical back: Neck supple.      Right lower leg: No edema.      Left lower leg: No edema.   Skin:     General: Skin is warm and dry.      Coloration: Skin is not jaundiced.          Significant Labs:  ABGs:   Recent Labs   Lab 12/12/23  2340   PH 7.447   PCO2 27.6*   HCO3 19.0*   POCSATURATED 99   BE -5*       BMP:   Recent Labs   Lab 12/13/23 0316   *  182*     136   K 4.3  4.2     105   CO2 19*  20*   BUN 6*  6*   CREATININE 0.6  0.6   CALCIUM 8.4*  8.4*   MG 1.8       CBC:   Recent Labs   Lab 12/13/23 0316   WBC 26.53*   RBC 2.32*   HGB 6.8*   HCT 21.0*      MCV 91   MCH 29.3   MCHC 32.4       CMP:   Recent Labs   Lab 12/13/23 0316   *  182*   CALCIUM 8.4*  8.4*   ALBUMIN 2.1*  2.1*  2.1*   PROT 6.4  6.4     136   K 4.3  4.2   CO2 19*  20*     105   BUN 6*  6*   CREATININE 0.6  0.6   ALKPHOS 171*  173*   ALT 24  24   AST 43*  43*   BILITOT 1.6*  1.6*       Coagulation:   Recent Labs   Lab 12/12/23  0607 12/12/23  1719 12/13/23 0316   INR 1.2  --   --    APTT  --    < > 41.2*    < > = values in this interval not displayed.       LFTs:   Recent Labs   Lab 12/13/23 0316   ALT 24  24   AST 43*  43*   ALKPHOS 171*  173*   BILITOT 1.6*  1.6*   PROT 6.4  6.4   ALBUMIN 2.1*  2.1*  2.1*       Microbiology Results (last 7 days)       Procedure Component Value Units  Date/Time    Blood culture [[id number]] Collected: 12/10/23 1149    Order Status: Completed Specimen: Blood from Line, Jugular, External Right Updated: 12/12/23 2012     Blood Culture, Routine No Growth to date      No Growth to date      No Growth to date    Blood culture [[id number]] Collected: 12/10/23 1240    Order Status: Completed Specimen: Blood from Peripheral, Antecubital, Right Updated: 12/12/23 2012     Blood Culture, Routine No Growth to date      No Growth to date      No Growth to date    Culture, Respiratory with Gram Stain [[id number]] Collected: 12/11/23 1643    Order Status: Completed Specimen: Respiratory from Sputum, Expectorated Updated: 12/12/23 1052     Respiratory Culture Insufficient incubation, culture in progress     Gram Stain (Respiratory) <10 epithelial cells per low power field.     Gram Stain (Respiratory) Rare WBC's     Gram Stain (Respiratory) Rare Gram positive cocci    Fungus culture [[id number]] Collected: 12/06/23 1532    Order Status: Completed Specimen: Body Fluid from Lung, Right Updated: 12/12/23 0915     Fungus (Mycology) Culture Culture in progress    Narrative:      Bronchial Wash    Clostridium difficile EIA [[id number]] Collected: 12/12/23 0110    Order Status: Completed Specimen: Stool Updated: 12/12/23 0418     C. diff Antigen Negative     C difficile Toxins A+B, EIA Negative     Comment: Testing not recommended for children <24 months old.       Culture, Respiratory [[id number]] Collected: 12/06/23 1531    Order Status: Completed Specimen: Respiratory from Bronchial Wash Updated: 12/08/23 1103     Respiratory Culture No growth     Gram Stain (Respiratory) Moderate WBC's     Gram Stain (Respiratory) Rare Gram negative rods    Narrative:      Bronchial Wash    AFB Culture & Smear [[id number]] Collected: 12/06/23 1532    Order Status: Completed Specimen: Body Fluid from Lung, Right Updated: 12/07/23 2127     AFB Culture & Smear Culture in progress     AFB CULTURE STAIN  No acid fast bacilli seen.    Narrative:      Bronchial Wash    Direct AFB stain [[id number]] Collected: 12/06/23 1532    Order Status: Completed Specimen: Body Fluid from Lung, Right Updated: 12/06/23 2257     Direct Acid Fast No acid fast bacilli seen.    Narrative:      Bronchial Wash    Gram stain [[id number]] Collected: 12/06/23 1532    Order Status: Canceled Specimen: Body Fluid from Lung, Right           Specimen (24h ago, onward)      None          Significant Imaging:  I have reviewed all imagining in the last 24 hours.

## 2023-12-13 NOTE — ASSESSMENT & PLAN NOTE
Lab Results   Component Value Date    CREATININE 0.6 12/13/2023    CREATININE 0.6 12/13/2023     Avoid insulin stacking  Titrate insulin slowly

## 2023-12-13 NOTE — PLAN OF CARE
Pt AAOx3. On 5LNC, nitric weaned off.   Gtts: Epi, Heparin   Pt anuric on CRRT. Tolerating -350.   Pt sat on EOB with PT/OT.  TF restarted @ 10mL/hr.   Pt turned q2h to prevent skin breakdown.

## 2023-12-13 NOTE — SUBJECTIVE & OBJECTIVE
"Interval HPI:   Overnight events: No acute events overnight. Patient in room 16026/66746 A. Blood glucose stable. BG at and above goal on current insulin regimen ([facility]I ). Steroid use- None. 9 Days Post-Op  Renal function- Normal   Vasopressors-  None       Endocrine will continue to follow and manage insulin orders inpatient.         Diet NPO Except for: Medication (per NG tube)     Eating:   NPO  Nausea: No  Hypoglycemia and intervention: No  Fever: No  TPN and/or TF: Yes  If yes, type of TF/TPN and rate: TF @ 10 ml/hr    BP (!) 72/0 (BP Location: Left arm, Patient Position: Lying)   Pulse 94   Temp 97.7 °F (36.5 °C)   Resp 16   Ht 6' (1.829 m)   Wt 78.5 kg (173 lb)   SpO2 99%   BMI 23.46 kg/m²     Labs Reviewed and Include    Recent Labs   Lab 12/13/23  0316   *  182*   CALCIUM 8.4*  8.4*   ALBUMIN 2.1*  2.1*  2.1*   PROT 6.4  6.4     136   K 4.3  4.2   CO2 19*  20*     105   BUN 6*  6*   CREATININE 0.6  0.6   ALKPHOS 171*  173*   ALT 24  24   AST 43*  43*   BILITOT 1.6*  1.6*     Lab Results   Component Value Date    WBC 26.53 (H) 12/13/2023    HGB 6.8 (L) 12/13/2023    HCT 34 (L) 12/13/2023    MCV 91 12/13/2023     12/13/2023     No results for input(s): "TSH", "FREET4" in the last 168 hours.  Lab Results   Component Value Date    HGBA1C 7.6 (H) 10/12/2023       Nutritional status:   Body mass index is 23.46 kg/m².  Lab Results   Component Value Date    ALBUMIN 2.1 (L) 12/13/2023    ALBUMIN 2.1 (L) 12/13/2023    ALBUMIN 2.1 (L) 12/13/2023     Lab Results   Component Value Date    PREALBUMIN 12 (L) 12/09/2023    PREALBUMIN 19 (L) 12/01/2023    PREALBUMIN 27 11/15/2023       Estimated Creatinine Clearance: 141.9 mL/min (based on SCr of 0.6 mg/dL).    Accu-Checks  Recent Labs     12/11/23 2003 12/11/23  2341 12/12/23  0411 12/12/23  0757 12/12/23  1222 12/12/23  1717 12/12/23 2005 12/13/23  0007 12/13/23  0407 12/13/23  0819   POCTGLUCOSE 153* 186* 186* 178* 211* " 164* 178* 160* 175* 196*       Current Medications and/or Treatments Impacting Glycemic Control  Immunotherapy:    Immunosuppressants       None          Steroids:   Hormones (From admission, onward)      Start     Stop Route Frequency Ordered    12/08/23 1700  vasopressin (PITRESSIN) 0.2 Units/mL in dextrose 5 % (D5W) 100 mL infusion         -- IV Continuous 12/08/23 1556          Pressors:    Autonomic Drugs (From admission, onward)      Start     Stop Route Frequency Ordered    12/10/23 1045  EPINEPHrine 5 mg in dextrose 5% 250 mL infusion (premix)         -- IV Continuous 12/10/23 0943    12/09/23 2100  midodrine tablet 15 mg         -- Oral 3 times daily 12/09/23 1839          Hyperglycemia/Diabetes Medications:   Antihyperglycemics (From admission, onward)      Start     Stop Route Frequency Ordered    12/11/23 1027  insulin aspart U-100 pen 0-10 Units         -- SubQ Every 4 hours PRN 12/11/23 0937

## 2023-12-13 NOTE — PROGRESS NOTES
[facility]  Heart Transplant  Progress Note    Patient Name: Radha Abbott  MRN: [id number]  Admission Date: 11/9/2023  Hospital Length of Stay: 34 days  Attending Physician: Sandhya Price MD  Primary Care Provider: Vasu Kong MD  Principal Problem:LVAD (left ventricular assist device) present    Subjective:   Interval History: POD #12 s/p HM3 LVAD implant. Successfully weaned off Ketty and vaso yesterday, remains on epi 0.04. CVP 9, SVO2 55, CO 7.0, CI 3.5, , will slowly wean epi today. Pt's wife also reports a small amount of urine production yesterday. Nephrology following, will take a break from CRRT today. WBC improved to 26 and cultures NGTD. ID recommended de-escalating antibiotics with discontinuing ashanti.     Continuous Infusions:   sodium chloride 0.9% 5 mL/hr at 12/13/23 1200    calcium gluconate 3 g in dextrose 5 % (D5W) 100 mL infusion 35 mL/hr at 12/13/23 1200    dextrose 10 % in water (D10W)      dextrose-sod citrate-citric ac 200 mL/hr at 12/13/23 1200    EPINEPHrine 0.04 mcg/kg/min (12/13/23 1200)    heparin (porcine) in D5W 12 Units/kg/hr (12/13/23 1200)    vasopressin Stopped (12/12/23 1227)     Scheduled Meds:   amiodarone  400 mg Per NG tube Daily    atorvastatin  40 mg Per NG tube Daily    meropenem (MERREM) IVPB  2 g Intravenous Q8H    midodrine  15 mg Oral TID    pantoprazole  40 mg Intravenous Daily    polyethylene glycol  17 g Per NG tube Daily    senna-docusate 8.6-50 mg  1 tablet Per NG tube Daily    vancomycin (VANCOCIN) IV (PEDS and ADULTS)  1,500 mg Intravenous Once    warfarin  4 mg Per NG tube Daily     PRN Meds:acetaminophen, albuterol sulfate, bisacodyL, dextrose 10 % in water (D10W), dextrose 10%, dextrose 10%, glucagon (human recombinant), insulin aspart U-100, magnesium hydroxide 400 mg/5 ml, magnesium sulfate IVPB, potassium chloride, potassium chloride, QUEtiapine, Flushing PICC/Midline Protocol **AND** [DISCONTINUED] sodium chloride 0.9%  **AND** sodium chloride 0.9%, sodium phosphate 20.01 mmol in dextrose 5 % (D5W) 250 mL IVPB, sodium phosphate 30 mmol in dextrose 5 % (D5W) 250 mL IVPB, sodium phosphate 39.99 mmol in dextrose 5 % (D5W) 250 mL IVPB, Pharmacy to dose Vancomycin consult **AND** vancomycin - pharmacy to dose    Review of patient's allergies indicates:  No Known Allergies  Objective:     Vital Signs (Most Recent):  Temp: 97.7 °F (36.5 °C) (12/13/23 0300)  Pulse: 96 (12/13/23 1200)  Resp: 16 (12/13/23 1200)  BP: (!) 72/0 (12/12/23 2300)  SpO2: 99 % (12/13/23 0825) Vital Signs (24h Range):  Temp:  [97.6 °F (36.4 °C)-98 °F (36.7 °C)] 97.7 °F (36.5 °C)  Pulse:  [] 96  Resp:  [15-20] 16  SpO2:  [97 %-99 %] 99 %  BP: (72-74)/(0) 72/0  Arterial Line BP: (74-92)/(56-84) 79/66     Patient Vitals for the past 72 hrs (Last 3 readings):   Weight   12/13/23 0228 78.5 kg (173 lb)   12/12/23 0500 78.5 kg (173 lb)   12/11/23 0730 78 kg (172 lb)       Body mass index is 23.46 kg/m².      Intake/Output Summary (Last 24 hours) at 12/13/2023 1300  Last data filed at 12/13/2023 1200  Gross per 24 hour   Intake 6838.19 ml   Output 7927 ml   Net -1088.81 ml         Hemodynamic Parameters:       Telemetry: NSR        Physical Exam  Constitutional:       Appearance: He is ill-appearing.   HENT:      Head: Normocephalic and atraumatic.   Eyes:      Pupils: Pupils are equal, round, and reactive to light.   Cardiovascular:      Rate and Rhythm: Normal rate and regular rhythm.      Comments: LVAD hum  Pulmonary:      Breath sounds: Rhonchi and rales present.   Abdominal:      General: Abdomen is flat. Bowel sounds are normal. There is no distension.      Palpations: Abdomen is soft.      Tenderness: There is no abdominal tenderness.   Musculoskeletal:         General: Normal range of motion.      Cervical back: Neck supple.   Skin:     General: Skin is warm and dry.   Neurological:      Comments: Awake and oriented.             Significant Labs:  CBC:  Recent  Labs   Lab 12/11/23  0405 12/11/23  0952 12/12/23  0411 12/12/23  2340 12/13/23  0316 12/13/23  0825   WBC 27.70*  --  33.19*  --  26.53*  --    RBC 2.68*  --  2.39*  --  2.32*  --    HGB 7.7*  --  7.2*  --  6.8*  --    HCT 23.2*   < > 21.8* 20* 21.0* 34*     --  329  --  358  --    MCV 87  --  91  --  91  --    MCH 28.7  --  30.1  --  29.3  --    MCHC 33.2  --  33.0  --  32.4  --     < > = values in this interval not displayed.       BNP:  Recent Labs   Lab 12/08/23  0408 12/11/23  0405 12/13/23  0316   * 2,154* 1,460*       CMP:  Recent Labs   Lab 12/11/23  0405 12/11/23  1151 12/12/23  0411 12/12/23  1412 12/12/23  2159 12/13/23  0316   *  199*  199*   < > 187*  187* 178* 199* 180*  182*   CALCIUM 8.7  8.7  8.8   < > 8.2*  8.3* 8.4* 8.1* 8.4*  8.4*   ALBUMIN 2.1*  2.1*  2.1*  2.1*   < > 2.1*  2.1* 2.1* 2.0* 2.1*  2.1*  2.1*   PROT 6.5  6.5  --  6.4  --   --  6.4  6.4   *  135*  136   < > 136  136 135* 136 136  136   K 4.2  4.2  4.1   < > 4.5  4.5 4.4 4.2 4.3  4.2   CO2 20*  20*  20*   < > 17*  17* 19* 18* 19*  20*     103  103   < > 104  105 103 103 105  105   BUN 7*  7*  8   < > 8  7* 8 6* 6*  6*   CREATININE 0.8  0.8  0.8   < > 0.7  0.7 0.6 0.5 0.6  0.6   ALKPHOS 174*  174*  --  183*  --   --  171*  173*   ALT 27  27  --  25  --   --  24  24   AST 47*  47*  --  44*  --   --  43*  43*   BILITOT 1.3*  1.4*  --  1.7*  --   --  1.6*  1.6*    < > = values in this interval not displayed.        Coagulation:   Recent Labs   Lab 12/12/23  0028 12/12/23  0607 12/12/23  1719 12/12/23  2309 12/13/23  0316 12/13/23  1130   INR 1.2 1.2  --   --   --  1.4*   APTT 62.0*  --  43.6* 45.1* 41.2*  --        LDH:  Recent Labs   Lab 12/11/23  0405 12/12/23  0411 12/13/23  0316   * 681* 609*       Microbiology:  Microbiology Results (last 7 days)       Procedure Component Value Units Date/Time    Culture, Respiratory with Gram Stain [[id number]]  Collected: 12/11/23 1643    Order Status: Completed Specimen: Respiratory from Sputum, Expectorated Updated: 12/13/23 0954     Respiratory Culture Normal respiratory bobby      No S aureus or Pseudomonas isolated.     Gram Stain (Respiratory) <10 epithelial cells per low power field.     Gram Stain (Respiratory) Rare WBC's     Gram Stain (Respiratory) Rare Gram positive cocci    Blood culture [[id number]] Collected: 12/10/23 1149    Order Status: Completed Specimen: Blood from Line, Jugular, External Right Updated: 12/12/23 2012     Blood Culture, Routine No Growth to date      No Growth to date      No Growth to date    Blood culture [[id number]] Collected: 12/10/23 1240    Order Status: Completed Specimen: Blood from Peripheral, Antecubital, Right Updated: 12/12/23 2012     Blood Culture, Routine No Growth to date      No Growth to date      No Growth to date    Fungus culture [[id number]] Collected: 12/06/23 1532    Order Status: Completed Specimen: Body Fluid from Lung, Right Updated: 12/12/23 0915     Fungus (Mycology) Culture Culture in progress    Narrative:      Bronchial Wash    Clostridium difficile EIA [[id number]] Collected: 12/12/23 0110    Order Status: Completed Specimen: Stool Updated: 12/12/23 0418     C. diff Antigen Negative     C difficile Toxins A+B, EIA Negative     Comment: Testing not recommended for children <24 months old.       Culture, Respiratory [[id number]] Collected: 12/06/23 1531    Order Status: Completed Specimen: Respiratory from Bronchial Wash Updated: 12/08/23 1103     Respiratory Culture No growth     Gram Stain (Respiratory) Moderate WBC's     Gram Stain (Respiratory) Rare Gram negative rods    Narrative:      Bronchial Wash    AFB Culture & Smear [[id number]] Collected: 12/06/23 1532    Order Status: Completed Specimen: Body Fluid from Lung, Right Updated: 12/07/23 2127     AFB Culture & Smear Culture in progress     AFB CULTURE STAIN No acid fast bacilli seen.    Narrative:       Bronchial Wash    Direct AFB stain [[id number]] Collected: 12/06/23 1532    Order Status: Completed Specimen: Body Fluid from Lung, Right Updated: 12/06/23 2257     Direct Acid Fast No acid fast bacilli seen.    Narrative:      Bronchial Wash    Gram stain [[id number]] Collected: 12/06/23 1532    Order Status: Canceled Specimen: Body Fluid from Lung, Right             BMP:   Recent Labs   Lab 12/13/23  0316   *  182*     136   K 4.3  4.2     105   CO2 19*  20*   BUN 6*  6*   CREATININE 0.6  0.6   CALCIUM 8.4*  8.4*   MG 1.8       I have reviewed all pertinent labs within the past 24 hours.    Estimated Creatinine Clearance: 141.9 mL/min (based on SCr of 0.6 mg/dL).    Diagnostic Results:  I have reviewed all pertinent imaging results/findings within the past 24 hours.  Assessment and Plan:     63 year old male with hx of CAD s/p 3v CABG (unclear anatomy, 2009), ICM with a recent EF of 15-20% s/p ICD (medtronik 2009), DM2 (a1c 7.7), HTN, HLD, Vfib on amio who presents to the ED with CC of SOB     Pt was recently admitted to [facility] [place] as a transfer.  He was started on a Lasix gtt and did well.  Started on gDMT and discharged home on  5 with plans to follow up in HTS clinic for ongoing transplant evaluation at another facility.  He says that about a few days ago he started to notice LE swelling.  This turned into Nelson and orthopnea.  He says he can't walk to the bathroom without getting SOB.  He also complains of weight gain.  He takes torsemide 20mg BID at home and was told to trial additional Lasix which he did without any improvement.  He was rx metolazone but has not been filled.  He came to the ED     In the ED he was AF with stable VS on RA.  CBC showing chronic anemia.  CMP notable for acute on chronic CKD with baseline around 2.1 and Cr now 2.6.  BNP elevated.  Lactate neg.  CXR showing pulmonary edema.  I evaluated the pt at the bedside.  Bedside TTE showing CVP >15.  He was  subsequently admitted to the CCU for diuresis.     He was continued on his home  and was started on a lasix gtt, which he responded well to overnight (net -1700cc. He feels much better this morning as well. Our transplant coordinators have been working on insurance approval and he is now being transferred to [facility] service for transplant evaluation.     * LVAD (left ventricular assist device) present  -HeartMate 3 Implanted  12/1/2023  as DT  -HTS Primary  -Implanted by Dr. Washington  -HOLD Coumadin,  Goal INR 2.0-3.0 . Subtherapeutic today. On Heparin gtt   -Antiplatelets Not on ASA  -LDH is stable overall today. Will continue to monitor daily.  -Speed set at  4900   rpm, LSL 4500 rpm   -Interrogation notable for no events  -Not listed for OHTx, declined for OHTX due to comorbidities         Procedure: Device Interrogation Including analysis of device parameters  Current Settings: Ventricular Assist Device  Review of device function is stable/unstable stable        12/13/2023     2:00 PM 12/13/2023     1:00 PM 12/13/2023    12:00 PM 12/13/2023    11:00 AM 12/13/2023    10:00 AM 12/13/2023     9:00 AM 12/13/2023     8:00 AM   TXP LVAD INTERROGATIONS   Type HeartMate3 HeartMate3 HeartMate3 HeartMate3 HeartMate3 HeartMate3 HeartMate3   Flow 3.8 4 4.1 4.2 4 4.2 4   Speed 4900 4900 4900 4900 4900 4900 4900   PI 4.3 4.1 4.1 4 4.7 4.2 4.4   Power ([place]) 3.2 3.3 3.5 3.4 3.3 3.3 3.2   LSL 4500 4500 4500 4500 4500 4500 4500         Leukocytosis  -WBC peaked at 33k  -Concern for septic shock. Also on hydrocortisone with increasing pressor requirements, now discontinued   -ID consulted, started on empiric micafungin, meropenem, and vanc  -Cultures remain NGTD, will de-escalate antibiotics with discontinuing micafungin    Depressed mood  -Psychiatry consulted for depressed mood, SI when left alone  -Recommend sitter when alone (possibly with transition to stepdown). No medications at this time     IVÁN (acute kidney  injury)  -Nephrology following  -Uremic    Altered mental status  -multifactorial  -likely metabolic encephalopathy +/- icu delirium   -CTH 12/7 negative for acute process  -Continue RRT for metabolic clearance  -Wean sedation as able  -EEG done over the weekend no seizures detected, just generalized slowing  -provide frequent re-orientation  -Promote sleep/wake cycle   -Neurology consulted   - Much more awake this AM, continue to encourage and motivate    Acute on chronic combined systolic and diastolic CHF, NYHA class 4  Pt with known ICM with an EF of 25% on home  presented with decompensated HF.      -lRHC 11/14 RA 14, PA 70/35, PCWP 32, CO 4.1, CI 2.1  -Repeat RHC 11/20 RA 20, PA 60/29 (39), PCWP 29, CO 4.6, CI 2.3  -ECHO 11/21 showed EF 15-20%, mild RV dysfunction, mild MR, LVED 6.9   -Home  5   -Home GDMT: Entreso 24/26 BID (on hold 2/2 IVÁN), hydralazin 25 q 8hr, all held prior to LVAD implant  -Home diuretic: Lasix 40 mg BID   -IABP inserted 11/21 and underwent DT HM3 implant 12/1, chest closed 12/4  -LVAD speed increased to 4900 rpm on 12/7   -ECHO 12/11 AV does not open, IVS midline, LVEDD 6.1, with HM3 speed set to 4900 rpm  -Increased pressor requirements with Giapreza, Ketty, epi, and vaso 12/11. Able to wean off giapreza overnight 12/11  -CVP 9, SVO2 55%, CO 7.0, CI 3.5,  on epi 0.04. Weaned off vaso and Ketty. Will attempt to wean off epi         PAF (paroxysmal atrial fibrillation)  Known hx of pAF. In sinus rhythm on admission, but 1 run of AF RVR overnight. He spontaneously converted.  - Continue home amiodarone 400mg qd  - A/c per primary team      Acute renal failure with acute tubular necrosis superimposed on stage 3b chronic kidney disease  IVÁN on CKD2. Baseline Cr of 1.7-2.0.   - Hold ARNi, entresto  -Trend BMPs daily   -SrCr 1.1/BUN 13 today  -SLED/SCUF for volume removal   -Nephrology following , will take a break from CRRT   -Midodrine 15 mg TID     Type 2 diabetes mellitus without  complication, without long-term current use of insulin  -A1c 7.6, not insulin dependent  - MDSSI  -Currently on Insulin gtt   - Endocrine following, appreciate assistance with blood glucose management    CAD (coronary artery disease)  -S/p 3vCABG in 2009  - home ASA, HI statin- on hold after surgery     Ventricular tachycardia  Hx VT s/p ICD placement (medtronic 2009)  - Continue home amio 400mg qd      Uninterrupted Critical Care/Counseling Time (not including procedures): 65 minutes      Vero Lozada PA-C  Heart Transplant  [facility] - [facility] Int[facility]

## 2023-12-13 NOTE — ASSESSMENT & PLAN NOTE
-WBC peaked at 33k  -Concern for septic shock. Also on hydrocortisone with increasing pressor requirements, now discontinued   -ID consulted, started on empiric micafungin, meropenem, and vanc  -Cultures remain NGTD, will de-escalate antibiotics with discontinuing micafungin

## 2023-12-13 NOTE — NURSING
Attempted to see patient for VAD education. Sitting up in bed w/ spouse at bedside. Patient states he is too tired from therapy for VAD education at this time. States PT reviewed parts of his controller with him. Will continue to follow.

## 2023-12-13 NOTE — ASSESSMENT & PLAN NOTE
- suspect acute tubular injury secondary to hypotension/cardiogenic shock likely compounded by intra-arterial contrast and anemia with urinary sediment with granular casts  - break from RRT today while monitoring UOP and renal function panels, will reassess tomorrow for indications to resume RRT  - recommend serial metabolic panels at least BID/Q12H in the interim  - renal diet/tube feeds when not NPO, volume restriction per primary team  - strict I/O's and daily weights  - keep MAP > 65 mmHg  - renally all dose medications to eGFR  - avoid nephrotoxic agents wean feasible (i.e. NSAIDs, intra-arterial contrast, supra-therapeutic vancomycin levels, etc.)

## 2023-12-13 NOTE — PROGRESS NOTES
12/13/23 1352   Treatment   Treatment Type SLED   Treatment Status Discontinued treatment;Blood returned   Dialyzer Time (hours) 48.1   BVP (Liters) 412.3 L   CRRT Comments sled completed, blood returned.   CRRT Hourly Documentation   Total UF (Hourly Cleared) (mL) 266     SLED discontinued as ordered. Blood returned. Pt lines flushed with NS, clamped, and capped. Machine disinfected at this time.

## 2023-12-13 NOTE — ASSESSMENT & PLAN NOTE
Patient is identified as having Combined Systolic and Diastolic heart failure that is Acute on chronic. CHF is currently uncontrolled due to JVD, Rales/crackles on pulmonary exam, and Pulmonary edema/pleural effusion on CXR. Latest ECHO performed and demonstrates- Results for orders placed during the hospital encounter of 11/09/23    Echo    Interpretation Summary    Left Ventricle: The left ventricle is moderately dilated. Mildly increased ventricular mass. Normal wall thickness. There is eccentric hypertrophy. Severe global hyperkinesis present. There is severely reduced systolic function with a visually estimated ejection fraction of 10 -15%. Grade III diastolic dysfunction.    Right Ventricle: Normal right ventricular cavity size. Systolic function is normal. Pacemaker lead present in the ventricle.    Left Atrium: Left atrium is severely dilated.    Mitral Valve: There is annular and bileaflet tethering. There is moderate regurgitation with a centrally directed jet.    Tricuspid Valve: There is mild regurgitation with a centrally directed jet.    Pulmonary Artery: The estimated pulmonary artery systolic pressure is 41 mmHg.    IVC/SVC: Normal venous pressure at 3 mmHg.    last BNP reviewed- and noted below   Recent Labs   Lab 12/13/23  0316   BNP 1,460*       - management per primary team  - LVAD for mechanical support and epinephrine for ionotropic support

## 2023-12-13 NOTE — ASSESSMENT & PLAN NOTE
62 yo male with LVAD for DT on 12/1, post op complicated by diffuse coagulopathy and RV dysfunction with subsequent chest closure on 12/4. Hospital course complicated by respiratory failure s/p intubation, with bronch on 12/6 notable for RLL mucus plug - cultures no growth to date. Also noted to have increasing leukocytosis over the past 24 hours, suspect initiation of stress dose steroids contributing to rise. CT without acute abscess - though noted to have cortes opacities. ECHO without vegetation and cx data unrevealing so far, resp cx in process. Remains on epi gtt.       Recommendations:  -continue empiric meropenem and vancomycin pharm to dose  -stop ashanti today  -if cx remain negative for gpc/staph tomorrow, favor stopping vancomycin tomorrow

## 2023-12-13 NOTE — NURSING
SICU PLAN OF CARE NOTE    Dx: LVAD (left ventricular assist device) present    Goals of Care:   MAP 70-80  Accuchecks Q4.    Vital Signs (last 12 hours):   Temp:  [97.6 °F (36.4 °C)-97.8 °F (36.6 °C)]   Pulse:  []   Resp:  [15]   BP: (72-74)/(0)   SpO2:  [97 %-98 %]   Arterial Line BP: (75-90)/(56-77)      Neuro: AAO x4, Follows Commands, and Moves All Extremities    Cardiac: NSR    Respiratory: Nasal Cannula 5L    Gtts: Epinephrine  and Heparin    Urine Output: Anuric due to CRRT    Drains: NG/OG Tube TF  cc /  shift    Diet: NPO and Tube Feeds    VAD :  Speed: 4900   Flow: 3.8-4.2  PI: 3.5-4.7  Power: 3.2-3.3    Labs/Accuchecks: Daily labs. Q4 accuchecks.     Skin:  All skin remains free from injury. Patient turned Q2 and PRN.    Shift Events:  Pt on CRRT, tolerating -400. No complaints of pain. TF continued at 10 ml/hr. Mg and Phos replaced. See flowsheet for further assessment/details. Family updated on current condition/plan of care, questions answered, and emotional support provided.  MD updated on current condition, vitals, labs, and gtts.  No new orders received, will continue to monitor.

## 2023-12-13 NOTE — PROGRESS NOTES
[facility] - Surgical Intensive Care  Infectious Disease  Progress Note    Patient Name: Radha Abbott  MRN: [id number]  Admission Date: 11/9/2023  Length of Stay: 34 days  Attending Physician: Sandhya Price MD  Primary Care Provider: Vasu Kong MD    Isolation Status: No active isolations  Assessment/Plan:      ID  Septic shock  62 yo male with LVAD for DT on 12/1, post op complicated by diffuse coagulopathy and RV dysfunction with subsequent chest closure on 12/4. Hospital course complicated by respiratory failure s/p intubation, with bronch on 12/6 notable for RLL mucus plug - cultures no growth to date. Also noted to have increasing leukocytosis over the past 24 hours, suspect initiation of stress dose steroids contributing to rise, now improving. CT without acute abscess - though noted to have cortes opacities. ECHO without vegetation and cx data unrevealing so far, resp cx in process. Remains on epi gtt, pressor requirements improving.       Recommendations:  -continue empiric meropenem and vancomycin pharm to dose  -stop ashanti today (I discontinued)  -if cx remain negative for gpc/staph tomorrow, favor stopping vancomycin tomorrow                      Thank you for your consult. I will follow-up with patient. Please contact us if you have any additional questions. Above d/w primary team.     Critical care time: 35 minutes   I personally spent critical care time on the following: evaluating this patient's organ dysfunction, development of treatment plan, discussing treatment plan with patient or surrogate and bedside caregivers, discussions with critical care service and/or consultants, evaluation of patient's response to treatment, physical examination of patient, ordering and review of treatments interventions, laboratory studies, and radiographic studies, re-evaluation of patient's condition. This critical care time did not overlap with that of any other provider of the same specialty or involve time  for procedures.       Layne Robles MD  Infectious Disease  [facility] - Surgical Intensive Care    Subjective:     Principal Problem:LVAD (left ventricular assist device) present    HPI:  Mr. Abbott is a 63-year-old man who underwent destination therapy left ventricular assist device placement.  Postoperatively the chest was left open due to diffuse coagulopathy in RV dysfunction.  He did well post-operatively and underwent washout and closure on 12/4. The patient was extubated but had to be reintubated. A BAL was performed: Gram stain with GNB, culture is NGTD. ID is consulted for BAL results. The patient is agitated but quiets down with reassurance. No fever or chills. Ecgo done this am - results pending. Family at bedside.     Interval History: Improving WBC this morning. Cx data so far unrevealing. Working with SLP this morning.     Review of Systems   Constitutional:  Negative for chills and fever.   Respiratory:  Positive for cough. Negative for shortness of breath.    Gastrointestinal:  Negative for abdominal pain.     Objective:     Vital Signs (Most Recent):  Temp: 97.7 °F (36.5 °C) (12/13/23 0300)  Pulse: 93 (12/13/23 0630)  Resp: 15 (12/12/23 2100)  BP: (!) 72/0 (12/12/23 2300)  SpO2: 97 % (12/13/23 0444) Vital Signs (24h Range):  Temp:  [97.6 °F (36.4 °C)-98 °F (36.7 °C)] 97.7 °F (36.5 °C)  Pulse:  [] 93  Resp:  [15] 15  SpO2:  [97 %-99 %] 97 %  BP: (72-74)/(0) 72/0  Arterial Line BP: ()/(56-94) 77/60     Weight: 78.5 kg (173 lb)  Body mass index is 23.46 kg/m².    Estimated Creatinine Clearance: 141.9 mL/min (based on SCr of 0.6 mg/dL).     Physical Exam  Constitutional:       General: He is not in acute distress.  HENT:      Right Ear: External ear normal.      Left Ear: External ear normal.      Nose:      Comments: NGT  Eyes:      General:         Right eye: No discharge.         Left eye: No discharge.   Pulmonary:      Effort: Pulmonary effort is normal. No respiratory distress.    Abdominal:      Comments: Abdominal dressing   Musculoskeletal:      Right lower leg: No edema.      Left lower leg: No edema.   Skin:     General: Skin is warm and dry.      Comments: R picc  RIJ       Neurological:      Mental Status: He is alert.          Significant Labs:   Microbiology Results (last 7 days)       Procedure Component Value Units Date/Time    Blood culture [[id number]] Collected: 12/10/23 1149    Order Status: Completed Specimen: Blood from Line, Jugular, External Right Updated: 12/12/23 2012     Blood Culture, Routine No Growth to date      No Growth to date      No Growth to date    Blood culture [[id number]] Collected: 12/10/23 1240    Order Status: Completed Specimen: Blood from Peripheral, Antecubital, Right Updated: 12/12/23 2012     Blood Culture, Routine No Growth to date      No Growth to date      No Growth to date    Culture, Respiratory with Gram Stain [[id number]] Collected: 12/11/23 1643    Order Status: Completed Specimen: Respiratory from Sputum, Expectorated Updated: 12/12/23 1052     Respiratory Culture Insufficient incubation, culture in progress     Gram Stain (Respiratory) <10 epithelial cells per low power field.     Gram Stain (Respiratory) Rare WBC's     Gram Stain (Respiratory) Rare Gram positive cocci    Fungus culture [[id number]] Collected: 12/06/23 1532    Order Status: Completed Specimen: Body Fluid from Lung, Right Updated: 12/12/23 0915     Fungus (Mycology) Culture Culture in progress    Narrative:      Bronchial Wash    Clostridium difficile EIA [[id number]] Collected: 12/12/23 0110    Order Status: Completed Specimen: Stool Updated: 12/12/23 0418     C. diff Antigen Negative     C difficile Toxins A+B, EIA Negative     Comment: Testing not recommended for children <24 months old.       Culture, Respiratory [[id number]] Collected: 12/06/23 1531    Order Status: Completed Specimen: Respiratory from Bronchial Wash Updated: 12/08/23 1103     Respiratory Culture No  growth     Gram Stain (Respiratory) Moderate WBC's     Gram Stain (Respiratory) Rare Gram negative rods    Narrative:      Bronchial Wash    AFB Culture & Smear [[id number]] Collected: 12/06/23 1532    Order Status: Completed Specimen: Body Fluid from Lung, Right Updated: 12/07/23 2127     AFB Culture & Smear Culture in progress     AFB CULTURE STAIN No acid fast bacilli seen.    Narrative:      Bronchial Wash    Direct AFB stain [[id number]] Collected: 12/06/23 1532    Order Status: Completed Specimen: Body Fluid from Lung, Right Updated: 12/06/23 2257     Direct Acid Fast No acid fast bacilli seen.    Narrative:      Bronchial Wash    Gram stain [[id number]] Collected: 12/06/23 1532    Order Status: Canceled Specimen: Body Fluid from Lung, Right             Significant Imaging: I have reviewed all pertinent imaging results/findings within the past 24 hours.

## 2023-12-13 NOTE — PT/OT/SLP PROGRESS
Physical Therapy Partial Co-Treatment    Patient Name:  Radha Abbott   MRN:  [id number]  Admitting Diagnosis:  LVAD (left ventricular assist device) present   Recent Surgery: Procedure(s) (LRB):  CLOSURE, WOUND, STERNUM (N/A)  INSERTION, GRAFT, PERICARDIUM  DRAINAGE, PLEURAL EFFUSION 9 Days Post-Op  Admit Date: 11/9/2023  Length of Stay: 34 days    Recommendations:     Discharge Recommendations: High Intensity Therapy  Discharge Equipment Recommendations: to be determined by next level of care   Barriers to discharge:  increased assist needed    Appropriate transfer level with nursing staff: bed level therex    Plan:     During this hospitalization, patient to be seen 6 x/week to address the identified rehab impairments via gait training, therapeutic activities, therapeutic exercises, neuromuscular re-education and progress towards the established goals.  Plan of Care Expires:  01/11/24  Plan of Care Reviewed with: patient, spouse    Assessment     Radha Abbott is a 63 y.o. male admitted with a medical diagnosis of LVAD (left ventricular assist device) present. Pt tolerated treatment session fairly today. Pt on CRRT via Rt IJ trialysis with stable pressor support. Nsg ok'ed EOB mobility. He was able to sit EOB an increased time on this date showing improving activity tolerance. Pt still with impaired cardiopulmonary response/endurance to activity as pt's MAP slowly decreased to 57 while time EOB increased. Pt with range of assist while EOB fluctuating from CGA regressing to Mod A with dynamic tasks. Pt reporting increased fatigue while EOB and dizziness as MAP decreased and was returned to supine. Pt with difficulty with short term recall of VAD education including names of various LVAD components on this date.    Problem List: weakness, impaired endurance, impaired self care skills, impaired functional mobility, gait instability, impaired balance, decreased upper extremity function, decreased lower extremity function,  decreased safety awareness, pain, impaired coordination, impaired fine motor, impaired cardiopulmonary response to activity, impaired skin.  Rehab Prognosis: Good; patient would benefit from acute skilled PT services to address these deficits and reach maximum level of function.      Goals:   Multidisciplinary Problems       Physical Therapy Goals          Problem: Physical Therapy    Goal Priority Disciplines Outcome Goal Variances Interventions   Physical Therapy Goal     PT, PT/OT Ongoing, Progressing     Description: Goals to be met by: 23     Patient will increase functional independence with mobility by performin. Sit to stand transfer with modified independence  2. Bed to chair transfer with supervision  3. Gait  x 1500 feet with supervision using physician approved AD with standing erst breaks prn.   4. Lower extremity exercise program x30 reps per handout, with independence  5. Recite 3/3 sternal precautions and remain complaint with precautions throughout session with no verbal cues                     Multidisciplinary Problems (Resolved)          Problem: Physical Therapy    Goal Priority Disciplines Outcome Goal Variances Interventions   Physical Therapy Goal   (Resolved)     PT, PT/OT Met     Description: Goals to be met by: 23     Patient will increase functional independence with mobility by performin. Evaluate pt's need for physical therapy.                          Subjective     RN notified prior to session. Wife present upon PT entrance into room. Patient agreeable to PT evaluation.    Chief Complaint: pt with no complaints, flat affect throughout  Patient/Family Comments/goals: get better  Pain/Comfort:  Pain Rating 1: 0/10  Pain Rating Post-Intervention 1: 0/10    Objective:     Additional staff present: OT; OT for cotx due to pt's multiple medical comorbidities and functional deficits req'ing two skilled therapists to appropriately progress pt's musculoskeletal  strength, neuromuscular control, and endurance while taking into consideration severe medical acuity in the ICU     Patient found HOB elevated with: telemetry, blood pressure cuff, pulse ox (continuous), arterial line, LVAD, Trialysis, NG tube, wound vac, PICC line, peripheral IV, oxygen (Ketty; CRRT actively running via Rt IJ)   Cognition:   Alert, Cooperative, and Flat affect  Patient is oriented to Person, Place, Time, Situation  Command following: Follows multistep verbal commands  Fluency: clear/fluent  General Precautions: Standard, Cardiac fall, LVAD, sternal   Orthopedic Precautions:N/A   Braces: N/A   Body mass index is 23.46 kg/m².  Oxygen Device: Nasal Cannula 5L and Nitric Oxide  Vitals: BP (!) 78/0 (BP Location: Left arm, Patient Position: Lying)   Pulse 101   Temp 98.8 °F (37.1 °C) (Oral)   Resp 20   Ht 6' (1.829 m)   Wt 78.5 kg (173 lb)   SpO2 99%   BMI 23.46 kg/m²     Outcome Measures:  AM-PAC 6 CLICK MOBILITY  Turning over in bed (including adjusting bedclothes, sheets and blankets)?: 2  Sitting down on and standing up from a chair with arms (e.g., wheelchair, bedside commode, etc.): 1  Moving from lying on back to sitting on the side of the bed?: 2  Moving to and from a bed to a chair (including a wheelchair)?: 1  Need to walk in hospital room?: 1  Climbing 3-5 steps with a railing?: 1  Basic Mobility Total Score: 8     Functional Mobility:    Bed Mobility:   Scooting to HOB via supine bridge: total assistance and 2 persons   Supine to Sit: moderate assistance and 2 persons for LE management and for trunk management; to Lt side of bed  Scooting anteriorly to EOB to have both feet planted on floor: maximal assistance   Sit to Supine: total assistance and 2 persons for LE management and for trunk management; to Lt side of bed    Sitting Balance at Edge of Bed:  Static Sitting Balance: Poor+ : able to maintain balance with minimal assistance from individual or chair  Dynamic Sitting Balance:  Poor+ : able to sit unsupported with minimal assistance and reach to ipsilateral side but unable to weight shift  Assistance Level Required: Contact Guard Assistance to Mod A  Time: 15 minutes  Postural deviations noted: slouched posture, rounded shoulders, forward head, trunk deviated left, and posterior pelvic tilt  Comments: Time EOB focused primarily on tolerance to upright positioning, cardiopulmonary response and endurance for activities, and strength of postural musculature to perform dynamic sitting to prepare for tasks in the home. Pt able to sit EOB statically with CGA but demo'ed difficulty maintaining upright posture when accepting internal and external perturbations requiring min A to mod A to correct post and Lt LOB. Pt given verbal/tactile cues for scapular retraction and neutral c-sp as well as visual cues to maintain midline posture. Pt with increasing assist level while EOB due to increased fatigue and MAP Decreasing to 57. Due to this pt was returned to supine.    Transfers/Gait:  Deferred due to pt's performance with above listed functional mobility    Education:  Time provided for education, counseling and discussion of health disposition in regards to patient's current status  All questions answered within PT scope of practice and to patient's satisfaction  PT role in POC to address current functional deficits  Pt educated on proper body mechanics, safety techniques, and energy conservation with PT facilitation and cueing throughout session  Pt educated on Post-op sternal precautions, including no lifting > 5 lbs, pulling or pushing with BUEs. Able to move arms within a pain free range.  LVAD: Pt able to perform self-test with max assistance from therapist  LVAD: Patient educated on names of the following LVAD components including: LVAD pump, controller, drive line, power cables, monitor. Patient with increased difficulty remembering names despite multiple reviewing sessions. Pt eventually able  to teach back to PT.    Patient left HOB elevated with all lines intact, call button in reach, RN notified, and wife present.    Time Tracking:     PT Received On: 12/13/23  PT Start Time: 1006     PT Stop Time: 1045  PT Total Time (min): 39 min     Billable Minutes:   Therapeutic Activity 15 minutes and Therapeutic Exercise 24 minutes    Treatment Type: Treatment  PT/PTA: PT       12/13/2023

## 2023-12-13 NOTE — PT/OT/SLP PROGRESS
Speech Language Pathology Treatment    Patient Name:  Radha Abbott   MRN:  [id number]  Admitting Diagnosis: LVAD (left ventricular assist device) present    Recommendations:                 General Recommendations:  ENT evaluation, Dysphagia therapy, and Modified barium swallow study  Diet recommendations:  NPO, Liquid Diet Level: NPO   Aspiration Precautions: Continue alternate means of nutrition and Strict aspiration precautions   General Precautions: Standard, LVAD, fall, sternal  Communication strategies:  provide increased time to answer    Assessment:     Radha Abbott is a 63 y.o. male with an SLP diagnosis of Dysphagia.     Subjective     Awake/alert  Spouse at bedside    Pain/Comfort:  Pain Rating 1: 0/10  Pain Rating Post-Intervention 1: 0/10      Objective:     Has the patient been evaluated by SLP for swallowing?   Yes  Keep patient NPO? No     Pt repositioned upright in bed for PO trials. Pt with wet dysphonic vocal quality upon phonation. Confusion evident throughout session, pt required max cues to produce secondary swallow. Pt continually throat cleared when asked to produce spontaneous swallow. Tsp thin liquids x2 and puree x3 tolerated with delayed swallow initiation, decreased hyolaryngeal excursion to palpation, and delayed coughing intermittently throughout session. Pt's cough was overall unproductive and audible congested. SLP provided ongoing education to pt and spouse regarding need for NPO diet, risk of aspiration, need for possible ENT consult to assess voice and MBSS to rule out aspiration and determine safest diet. SLP also discussed restrictions of completing MBSS while pt on CRRT and unable to transport to radiology. Both pt and spouse verbalized understanding.     Goals:   Multidisciplinary Problems       SLP Goals          Problem: SLP    Goal Priority Disciplines Outcome   SLP Goal     SLP    Description: Speech Language Pathology Goals  Goals expected to be met by 12/24    1. Pt will  participate in ongoing assessment of swallow function to help determine least restrictive diet                            Plan:     Patient to be seen:  4 x/week   Plan of Care reviewed with:  patient, spouse   SLP Follow-Up:  Yes       Discharge recommendations:    TBD      Time Tracking:     SLP Treatment Date:   12/13/23  Speech Start Time:  0930  Speech Stop Time:  0944     Speech Total Time (min):  14 min    Billable Minutes: Treatment Swallowing Dysfunction 6 and Self Care/Home Management Training 8    12/13/2023

## 2023-12-13 NOTE — ASSESSMENT & PLAN NOTE
Pt with known ICM with an EF of 25% on home  presented with decompensated HF.      -lRHC 11/14 RA 14, PA 70/35, PCWP 32, CO 4.1, CI 2.1  -Repeat RHC 11/20 RA 20, PA 60/29 (39), PCWP 29, CO 4.6, CI 2.3  -ECHO 11/21 showed EF 15-20%, mild RV dysfunction, mild MR, LVED 6.9   -Home  5   -Home GDMT: Entreso 24/26 BID (on hold 2/2 IVÁN), hydralazin 25 q 8hr, all held prior to LVAD implant  -Home diuretic: Lasix 40 mg BID   -IABP inserted 11/21 and underwent DT HM3 implant 12/1, chest closed 12/4  -LVAD speed increased to 4900 rpm on 12/7   -ECHO 12/11 AV does not open, IVS midline, LVEDD 6.1, with HM3 speed set to 4900 rpm  -Increased pressor requirements with Giapreza, Ketty, epi, and vaso 12/11. Able to wean off giapreza overnight 12/11  -CVP 9, SVO2 55%, CO 7.0, CI 3.5,  on epi 0.04. Weaned off vaso and Ketty. Will attempt to wean off epi

## 2023-12-13 NOTE — PROGRESS NOTES
12/13/2023  Alondra Dykes    Current provider:  Sandhya Price MD    Device interrogation:      12/13/2023    11:00 AM 12/13/2023    10:00 AM 12/13/2023     9:00 AM 12/13/2023     8:00 AM 12/13/2023     7:00 AM 12/13/2023     6:00 AM 12/13/2023     5:00 AM   TXP LVAD INTERROGATIONS   Type HeartMate3 HeartMate3 HeartMate3 HeartMate3 HeartMate3 HeartMate3 HeartMate3   Flow 4.2 4 4.2 4 4.3 4.2 4.1   Speed 4900 4900 4900 4900 4900 4900 4900   PI 4 4.7 4.2 4.4 3.8 4 4.3   Power ([place]) 3.4 3.3 3.3 3.2 3.3 3.3 3.4   LSL 4500 4500 4500 4500 4500 4500 4500   Pulsatility      Intermittent pulse Intermittent pulse          Rounded on [facility] to ensure all mechanical assist device settings (IABP or VAD) were appropriate and all parameters were within limits.  I was able to ensure all back up equipment was present, the staff had no issues, and the Perfusion Department daily rounding was complete.      For implantable VADs: Interrogation of Ventricular assist device was performed with analysis of device parameters and review of device function. I have personally reviewed the interrogation findings and agree with findings as stated.     In emergency, the nursing units have been notified to contact the perfusion department either by:  Calling [id number] from 630am to 4pm Mon thru Fri, utilizing the On-Call Finder functionality of Epic and searching for Perfusion, or by contacting the hospital  from 4pm to 630am and on weekends and asking to speak with the perfusionist on call.    11:29 AM

## 2023-12-13 NOTE — SUBJECTIVE & OBJECTIVE
Interval History: POD #12 s/p HM3 LVAD implant. Successfully weaned off Ketty and vaso yesterday, remains on epi 0.04. CVP 9, SVO2 55, CO 7.0, CI 3.5, , will slowly wean epi today. Pt's wife also reports a small amount of urine production yesterday. Nephrology following, will take a break from CRRT today. WBC improved to 26 and cultures NGTD. ID recommended de-escalating antibiotics with discontinuing ashanti.     Continuous Infusions:   sodium chloride 0.9% 5 mL/hr at 12/13/23 1200    calcium gluconate 3 g in dextrose 5 % (D5W) 100 mL infusion 35 mL/hr at 12/13/23 1200    dextrose 10 % in water (D10W)      dextrose-sod citrate-citric ac 200 mL/hr at 12/13/23 1200    EPINEPHrine 0.04 mcg/kg/min (12/13/23 1200)    heparin (porcine) in D5W 12 Units/kg/hr (12/13/23 1200)    vasopressin Stopped (12/12/23 1227)     Scheduled Meds:   amiodarone  400 mg Per NG tube Daily    atorvastatin  40 mg Per NG tube Daily    meropenem (MERREM) IVPB  2 g Intravenous Q8H    midodrine  15 mg Oral TID    pantoprazole  40 mg Intravenous Daily    polyethylene glycol  17 g Per NG tube Daily    senna-docusate 8.6-50 mg  1 tablet Per NG tube Daily    vancomycin (VANCOCIN) IV (PEDS and ADULTS)  1,500 mg Intravenous Once    warfarin  4 mg Per NG tube Daily     PRN Meds:acetaminophen, albuterol sulfate, bisacodyL, dextrose 10 % in water (D10W), dextrose 10%, dextrose 10%, glucagon (human recombinant), insulin aspart U-100, magnesium hydroxide 400 mg/5 ml, magnesium sulfate IVPB, potassium chloride, potassium chloride, QUEtiapine, Flushing PICC/Midline Protocol **AND** [DISCONTINUED] sodium chloride 0.9% **AND** sodium chloride 0.9%, sodium phosphate 20.01 mmol in dextrose 5 % (D5W) 250 mL IVPB, sodium phosphate 30 mmol in dextrose 5 % (D5W) 250 mL IVPB, sodium phosphate 39.99 mmol in dextrose 5 % (D5W) 250 mL IVPB, Pharmacy to dose Vancomycin consult **AND** vancomycin - pharmacy to dose    Review of patient's allergies indicates:  No Known  Allergies  Objective:     Vital Signs (Most Recent):  Temp: 97.7 °F (36.5 °C) (12/13/23 0300)  Pulse: 96 (12/13/23 1200)  Resp: 16 (12/13/23 1200)  BP: (!) 72/0 (12/12/23 2300)  SpO2: 99 % (12/13/23 0825) Vital Signs (24h Range):  Temp:  [97.6 °F (36.4 °C)-98 °F (36.7 °C)] 97.7 °F (36.5 °C)  Pulse:  [] 96  Resp:  [15-20] 16  SpO2:  [97 %-99 %] 99 %  BP: (72-74)/(0) 72/0  Arterial Line BP: (74-92)/(56-84) 79/66     Patient Vitals for the past 72 hrs (Last 3 readings):   Weight   12/13/23 0228 78.5 kg (173 lb)   12/12/23 0500 78.5 kg (173 lb)   12/11/23 0730 78 kg (172 lb)       Body mass index is 23.46 kg/m².      Intake/Output Summary (Last 24 hours) at 12/13/2023 1300  Last data filed at 12/13/2023 1200  Gross per 24 hour   Intake 6838.19 ml   Output 7927 ml   Net -1088.81 ml         Hemodynamic Parameters:       Telemetry: NSR        Physical Exam  Constitutional:       Appearance: He is ill-appearing.   HENT:      Head: Normocephalic and atraumatic.   Eyes:      Pupils: Pupils are equal, round, and reactive to light.   Cardiovascular:      Rate and Rhythm: Normal rate and regular rhythm.      Comments: LVAD hum  Pulmonary:      Breath sounds: Rhonchi and rales present.   Abdominal:      General: Abdomen is flat. Bowel sounds are normal. There is no distension.      Palpations: Abdomen is soft.      Tenderness: There is no abdominal tenderness.   Musculoskeletal:         General: Normal range of motion.      Cervical back: Neck supple.   Skin:     General: Skin is warm and dry.   Neurological:      Comments: Awake and oriented.             Significant Labs:  CBC:  Recent Labs   Lab 12/11/23  0405 12/11/23  0952 12/12/23  0411 12/12/23  2340 12/13/23  0316 12/13/23  0825   WBC 27.70*  --  33.19*  --  26.53*  --    RBC 2.68*  --  2.39*  --  2.32*  --    HGB 7.7*  --  7.2*  --  6.8*  --    HCT 23.2*   < > 21.8* 20* 21.0* 34*     --  329  --  358  --    MCV 87  --  91  --  91  --    MCH 28.7  --  30.1  --   29.3  --    MCHC 33.2  --  33.0  --  32.4  --     < > = values in this interval not displayed.       BNP:  Recent Labs   Lab 12/08/23  0408 12/11/23  0405 12/13/23 0316   * 2,154* 1,460*       CMP:  Recent Labs   Lab 12/11/23  0405 12/11/23  1151 12/12/23  0411 12/12/23  1412 12/12/23  2159 12/13/23 0316   *  199*  199*   < > 187*  187* 178* 199* 180*  182*   CALCIUM 8.7  8.7  8.8   < > 8.2*  8.3* 8.4* 8.1* 8.4*  8.4*   ALBUMIN 2.1*  2.1*  2.1*  2.1*   < > 2.1*  2.1* 2.1* 2.0* 2.1*  2.1*  2.1*   PROT 6.5  6.5  --  6.4  --   --  6.4  6.4   *  135*  136   < > 136  136 135* 136 136  136   K 4.2  4.2  4.1   < > 4.5  4.5 4.4 4.2 4.3  4.2   CO2 20*  20*  20*   < > 17*  17* 19* 18* 19*  20*     103  103   < > 104  105 103 103 105  105   BUN 7*  7*  8   < > 8  7* 8 6* 6*  6*   CREATININE 0.8  0.8  0.8   < > 0.7  0.7 0.6 0.5 0.6  0.6   ALKPHOS 174*  174*  --  183*  --   --  171*  173*   ALT 27  27  --  25  --   --  24  24   AST 47*  47*  --  44*  --   --  43*  43*   BILITOT 1.3*  1.4*  --  1.7*  --   --  1.6*  1.6*    < > = values in this interval not displayed.        Coagulation:   Recent Labs   Lab 12/12/23  0028 12/12/23  0607 12/12/23  1719 12/12/23  2309 12/13/23  0316 12/13/23  1130   INR 1.2 1.2  --   --   --  1.4*   APTT 62.0*  --  43.6* 45.1* 41.2*  --        LDH:  Recent Labs   Lab 12/11/23  0405 12/12/23  0411 12/13/23  0316   * 681* 609*       Microbiology:  Microbiology Results (last 7 days)       Procedure Component Value Units Date/Time    Culture, Respiratory with Gram Stain [[id number]] Collected: 12/11/23 1643    Order Status: Completed Specimen: Respiratory from Sputum, Expectorated Updated: 12/13/23 0954     Respiratory Culture Normal respiratory bobby      No S aureus or Pseudomonas isolated.     Gram Stain (Respiratory) <10 epithelial cells per low power field.     Gram Stain (Respiratory) Rare WBC's     Gram Stain  (Respiratory) Rare Gram positive cocci    Blood culture [[id number]] Collected: 12/10/23 1149    Order Status: Completed Specimen: Blood from Line, Jugular, External Right Updated: 12/12/23 2012     Blood Culture, Routine No Growth to date      No Growth to date      No Growth to date    Blood culture [[id number]] Collected: 12/10/23 1240    Order Status: Completed Specimen: Blood from Peripheral, Antecubital, Right Updated: 12/12/23 2012     Blood Culture, Routine No Growth to date      No Growth to date      No Growth to date    Fungus culture [[id number]] Collected: 12/06/23 1532    Order Status: Completed Specimen: Body Fluid from Lung, Right Updated: 12/12/23 0915     Fungus (Mycology) Culture Culture in progress    Narrative:      Bronchial Wash    Clostridium difficile EIA [[id number]] Collected: 12/12/23 0110    Order Status: Completed Specimen: Stool Updated: 12/12/23 0418     C. diff Antigen Negative     C difficile Toxins A+B, EIA Negative     Comment: Testing not recommended for children <24 months old.       Culture, Respiratory [[id number]] Collected: 12/06/23 1531    Order Status: Completed Specimen: Respiratory from Bronchial Wash Updated: 12/08/23 1103     Respiratory Culture No growth     Gram Stain (Respiratory) Moderate WBC's     Gram Stain (Respiratory) Rare Gram negative rods    Narrative:      Bronchial Wash    AFB Culture & Smear [[id number]] Collected: 12/06/23 1532    Order Status: Completed Specimen: Body Fluid from Lung, Right Updated: 12/07/23 2127     AFB Culture & Smear Culture in progress     AFB CULTURE STAIN No acid fast bacilli seen.    Narrative:      Bronchial Wash    Direct AFB stain [[id number]] Collected: 12/06/23 1532    Order Status: Completed Specimen: Body Fluid from Lung, Right Updated: 12/06/23 2257     Direct Acid Fast No acid fast bacilli seen.    Narrative:      Bronchial Wash    Gram stain [[id number]] Collected: 12/06/23 1532    Order Status: Canceled Specimen: Body  Fluid from Lung, Right             BMP:   Recent Labs   Lab 12/13/23  0316   *  182*     136   K 4.3  4.2     105   CO2 19*  20*   BUN 6*  6*   CREATININE 0.6  0.6   CALCIUM 8.4*  8.4*   MG 1.8       I have reviewed all pertinent labs within the past 24 hours.    Estimated Creatinine Clearance: 141.9 mL/min (based on SCr of 0.6 mg/dL).    Diagnostic Results:  I have reviewed all pertinent imaging results/findings within the past 24 hours.

## 2023-12-13 NOTE — PROGRESS NOTES
Update:  SW met with pt and his wife in pt's room.  Pt is alert and oriented, communicating with SW.  Support and encouragement given to pt.  Both pt and his wife seem to have a good understanding of the POC at this time.  No needs expressed at this time.  SW will continue to follow.

## 2023-12-13 NOTE — PROGRESS NOTES
[facility] - [facility]  Endocrinology  Progress Note    Admit Date: 11/9/2023     Reason for Consult: Management of T2DM, Hyperglycemia     Surgical Procedure and Date: n/a    Diabetes diagnosis year: 1998    Home Diabetes Medications:  Metformin (off since October)   Lab Results   Component Value Date    HGBA1C 7.6 (H) 10/12/2023       How often checking glucose at home?  Once daily in the AM    BG readings on regimen: 150-160s  Hypoglycemia on the regimen?  No  Missed doses on regimen?  n/a    Diabetes Complications include:     Hyperglycemia    Complicating diabetes co morbidities:   CAD s/p CABG, HTN, HLD      HPI:   Patient is a 63 y.o. male with a diagnosis of CAD s/p 3v CABG (unclear anatomy, 2009), ICM with a recent EF of 15-20% s/p ICD (medtronik 2009), DM2 (a1c 7.7), HTN, HLD, Vfib on amio who presents to the ED with CC of SOB. In the ED he was AF with stable VS on RA.  CBC showing chronic anemia.  CMP notable for acute on chronic CKD with baseline around 2.1 and Cr now 2.6.  BNP elevated.  Lactate neg.  CXR showing pulmonary edema. He was subsequently admitted to the CCU for diuresis.  Endocrinology consulted for management of T2DM.          Interval HPI:   Overnight events: No acute events overnight. Patient in room 71930/78057 A. Blood glucose stable. BG at and above goal on current insulin regimen (SSI ). Steroid use- None. 9 Days Post-Op  Renal function- Normal   Vasopressors-  None       Endocrine will continue to follow and manage insulin orders inpatient.         Diet NPO Except for: Medication (per NG tube)     Eating:   NPO  Nausea: No  Hypoglycemia and intervention: No  Fever: No  TPN and/or TF: Yes  If yes, type of TF/TPN and rate: TF @ 10 ml/hr    BP (!) 72/0 (BP Location: Left arm, Patient Position: Lying)   Pulse 94   Temp 97.7 °F (36.5 °C)   Resp 16   Ht 6' (1.829 m)   Wt 78.5 kg (173 lb)   SpO2 99%   BMI 23.46 kg/m²     Labs Reviewed and Include    Recent Labs   Lab  "12/13/23  0316   *  182*   CALCIUM 8.4*  8.4*   ALBUMIN 2.1*  2.1*  2.1*   PROT 6.4  6.4     136   K 4.3  4.2   CO2 19*  20*     105   BUN 6*  6*   CREATININE 0.6  0.6   ALKPHOS 171*  173*   ALT 24  24   AST 43*  43*   BILITOT 1.6*  1.6*     Lab Results   Component Value Date    WBC 26.53 (H) 12/13/2023    HGB 6.8 (L) 12/13/2023    HCT 34 (L) 12/13/2023    MCV 91 12/13/2023     12/13/2023     No results for input(s): "TSH", "FREET4" in the last 168 hours.  Lab Results   Component Value Date    HGBA1C 7.6 (H) 10/12/2023       Nutritional status:   Body mass index is 23.46 kg/m².  Lab Results   Component Value Date    ALBUMIN 2.1 (L) 12/13/2023    ALBUMIN 2.1 (L) 12/13/2023    ALBUMIN 2.1 (L) 12/13/2023     Lab Results   Component Value Date    PREALBUMIN 12 (L) 12/09/2023    PREALBUMIN 19 (L) 12/01/2023    PREALBUMIN 27 11/15/2023       Estimated Creatinine Clearance: 141.9 mL/min (based on SCr of 0.6 mg/dL).    Accu-Checks  Recent Labs     12/11/23 2003 12/11/23  2341 12/12/23  0411 12/12/23  0757 12/12/23  1222 12/12/23  1717 12/12/23  2005 12/13/23  0007 12/13/23  0407 12/13/23  0819   POCTGLUCOSE 153* 186* 186* 178* 211* 164* 178* 160* 175* 196*       Current Medications and/or Treatments Impacting Glycemic Control  Immunotherapy:    Immunosuppressants       None          Steroids:   Hormones (From admission, onward)      Start     Stop Route Frequency Ordered    12/08/23 1700  vasopressin (PITRESSIN) 0.2 Units/mL in dextrose 5 % (D5W) 100 mL infusion         -- IV Continuous 12/08/23 1556          Pressors:    Autonomic Drugs (From admission, onward)      Start     Stop Route Frequency Ordered    12/10/23 1045  EPINEPHrine 5 mg in dextrose 5% 250 mL infusion (premix)         -- IV Continuous 12/10/23 0943    12/09/23 2100  midodrine tablet 15 mg         -- Oral 3 times daily 12/09/23 7855          Hyperglycemia/Diabetes Medications:   Antihyperglycemics (From admission, " onward)      Start     Stop Route Frequency Ordered    12/11/23 1027  insulin aspart U-100 pen 0-10 Units         -- SubQ Every 4 hours PRN 12/11/23 0967            ASSESSMENT and PLAN    Cardiac/Vascular  * LVAD (left ventricular assist device) present  Optimize BG control to improve wound healing        PAF (paroxysmal atrial fibrillation)  May increase insulin resistance.         Acute on chronic combined systolic and diastolic CHF, NYHA class 4  Managed per primary team  Optimize BG control  S/p LVAD     Renal/  Acute renal failure with acute tubular necrosis superimposed on stage 3b chronic kidney disease  Lab Results   Component Value Date    CREATININE 0.6 12/13/2023    CREATININE 0.6 12/13/2023     Avoid insulin stacking  Titrate insulin slowly       Endocrine  Type 2 diabetes mellitus without complication, without long-term current use of insulin  BG goal 140-180.      - BG checks q4hr while on TF/NPO   - Novolog (aspart) insulin prn for BG excursions [facility] (150/25)   - Hypoglycemia protocol in place    ** Please notify Endocrine for any change and/or advance in diet**  ** Please call Endocrine for any BG related issues **    Discharge Planning:   TBD. Please notify endocrinology prior to discharge.                 Erasmo Parrish, DNP, FNP  Endocrinology  [facility] - [facility]

## 2023-12-14 PROBLEM — R49.0 DYSPHONIA: Status: ACTIVE | Noted: 2023-12-14

## 2023-12-14 PROBLEM — J38.01 VOCAL CORD PARALYSIS, UNILATERAL COMPLETE: Status: ACTIVE | Noted: 2023-12-14

## 2023-12-14 PROBLEM — R34 OLIGURIA: Status: ACTIVE | Noted: 2023-12-14

## 2023-12-14 PROBLEM — E44.0 MODERATE MALNUTRITION: Status: ACTIVE | Noted: 2023-12-14

## 2023-12-14 LAB
ABO + RH BLD: NORMAL
ALBUMIN SERPL BCP-MCNC: 2 G/DL (ref 3.5–5.2)
ALLENS TEST: ABNORMAL
ALLENS TEST: NORMAL
ALLENS TEST: NORMAL
ALP SERPL-CCNC: 198 U/L (ref 55–135)
ALT SERPL W/O P-5'-P-CCNC: 20 U/L (ref 10–44)
ANION GAP SERPL CALC-SCNC: 11 MMOL/L (ref 8–16)
ANION GAP SERPL CALC-SCNC: 11 MMOL/L (ref 8–16)
ANION GAP SERPL CALC-SCNC: 12 MMOL/L (ref 8–16)
APTT PPP: 50.7 SEC (ref 21–32)
AST SERPL-CCNC: 40 U/L (ref 10–40)
BASOPHILS # BLD AUTO: 0.04 K/UL (ref 0–0.2)
BASOPHILS NFR BLD: 0.2 % (ref 0–1.9)
BILIRUB DIRECT SERPL-MCNC: 0.8 MG/DL (ref 0.1–0.3)
BILIRUB SERPL-MCNC: 1.6 MG/DL (ref 0.1–1)
BLD GP AB SCN CELLS X3 SERPL QL: NORMAL
BUN SERPL-MCNC: 30 MG/DL (ref 8–23)
BUN SERPL-MCNC: 31 MG/DL (ref 8–23)
BUN SERPL-MCNC: 40 MG/DL (ref 8–23)
CALCIUM SERPL-MCNC: 7.5 MG/DL (ref 8.7–10.5)
CALCIUM SERPL-MCNC: 8 MG/DL (ref 8.7–10.5)
CALCIUM SERPL-MCNC: 8.3 MG/DL (ref 8.7–10.5)
CHLORIDE SERPL-SCNC: 103 MMOL/L (ref 95–110)
CHLORIDE SERPL-SCNC: 104 MMOL/L (ref 95–110)
CHLORIDE SERPL-SCNC: 108 MMOL/L (ref 95–110)
CO2 SERPL-SCNC: 19 MMOL/L (ref 23–29)
CO2 SERPL-SCNC: 22 MMOL/L (ref 23–29)
CO2 SERPL-SCNC: 22 MMOL/L (ref 23–29)
CREAT SERPL-MCNC: 2.1 MG/DL (ref 0.5–1.4)
CREAT SERPL-MCNC: 2.1 MG/DL (ref 0.5–1.4)
CREAT SERPL-MCNC: 2.5 MG/DL (ref 0.5–1.4)
DELSYS: ABNORMAL
DELSYS: NORMAL
DELSYS: NORMAL
DIFFERENTIAL METHOD BLD: ABNORMAL
EOSINOPHIL # BLD AUTO: 0.1 K/UL (ref 0–0.5)
EOSINOPHIL NFR BLD: 0.6 % (ref 0–8)
ERYTHROCYTE [DISTWIDTH] IN BLOOD BY AUTOMATED COUNT: 20 % (ref 11.5–14.5)
EST. GFR  (NO RACE VARIABLE): 28.5 ML/MIN/1.73 M^2
EST. GFR  (NO RACE VARIABLE): 35.2 ML/MIN/1.73 M^2
EST. GFR  (NO RACE VARIABLE): 35.2 ML/MIN/1.73 M^2
FINAL PATHOLOGIC DIAGNOSIS: NORMAL
FIO2: 21
FIO2: 21
FLOW: 3
GLUCOSE SERPL-MCNC: 118 MG/DL (ref 70–110)
GLUCOSE SERPL-MCNC: 119 MG/DL (ref 70–110)
GLUCOSE SERPL-MCNC: 224 MG/DL (ref 70–110)
HCO3 UR-SCNC: 17.2 MMOL/L (ref 24–28)
HCO3 UR-SCNC: 22.4 MMOL/L (ref 24–28)
HCO3 UR-SCNC: 22.5 MMOL/L (ref 24–28)
HCT VFR BLD AUTO: 19.7 % (ref 40–54)
HCT VFR BLD CALC: 17 %PCV (ref 36–54)
HGB BLD-MCNC: 6.4 G/DL (ref 14–18)
IMM GRANULOCYTES # BLD AUTO: 0.94 K/UL (ref 0–0.04)
IMM GRANULOCYTES NFR BLD AUTO: 4.8 % (ref 0–0.5)
INR PPP: 1.6 (ref 0.8–1.2)
LDH SERPL L TO P-CCNC: 0.4 MMOL/L (ref 0.36–1.25)
LDH SERPL L TO P-CCNC: 0.75 MMOL/L (ref 0.36–1.25)
LDH SERPL L TO P-CCNC: 565 U/L (ref 110–260)
LYMPHOCYTES # BLD AUTO: 0.6 K/UL (ref 1–4.8)
LYMPHOCYTES NFR BLD: 3 % (ref 18–48)
Lab: NORMAL
MAGNESIUM SERPL-MCNC: 2.6 MG/DL (ref 1.6–2.6)
MCH RBC QN AUTO: 29.1 PG (ref 27–31)
MCHC RBC AUTO-ENTMCNC: 32.5 G/DL (ref 32–36)
MCV RBC AUTO: 90 FL (ref 82–98)
MICROSCOPIC EXAM: NORMAL
MODE: ABNORMAL
MODE: NORMAL
MODE: NORMAL
MONOCYTES # BLD AUTO: 1.2 K/UL (ref 0.3–1)
MONOCYTES NFR BLD: 6.3 % (ref 4–15)
NEUTROPHILS # BLD AUTO: 16.7 K/UL (ref 1.8–7.7)
NEUTROPHILS NFR BLD: 85.1 % (ref 38–73)
NRBC BLD-RTO: 5 /100 WBC
PCO2 BLDA: 25.7 MMHG (ref 35–45)
PCO2 BLDA: 36.5 MMHG (ref 35–45)
PCO2 BLDA: 36.6 MMHG (ref 35–45)
PH SMN: 7.4 [PH] (ref 7.35–7.45)
PH SMN: 7.4 [PH] (ref 7.35–7.45)
PH SMN: 7.43 [PH] (ref 7.35–7.45)
PHOSPHATE SERPL-MCNC: 1.7 MG/DL (ref 2.7–4.5)
PLATELET # BLD AUTO: 416 K/UL (ref 150–450)
PMV BLD AUTO: 11.6 FL (ref 9.2–12.9)
PO2 BLDA: 105 MMHG (ref 80–100)
PO2 BLDA: 173 MMHG (ref 80–100)
PO2 BLDA: 26 MMHG (ref 40–60)
PO2 BLDA: 35 MMHG (ref 40–60)
POC BE: -2 MMOL/L
POC BE: -2 MMOL/L
POC BE: -7 MMOL/L
POC IONIZED CALCIUM: 1.04 MMOL/L (ref 1.06–1.42)
POC SATURATED O2: 100 % (ref 95–100)
POC SATURATED O2: 47 % (ref 95–100)
POC SATURATED O2: 65 % (ref 95–100)
POC SATURATED O2: 98 % (ref 95–100)
POC TCO2: 18 MMOL/L (ref 23–27)
POC TCO2: 24 MMOL/L (ref 23–27)
POC TCO2: 24 MMOL/L (ref 24–29)
POCT GLUCOSE: 135 MG/DL (ref 70–110)
POCT GLUCOSE: 135 MG/DL (ref 70–110)
POCT GLUCOSE: 209 MG/DL (ref 70–110)
POCT GLUCOSE: 212 MG/DL (ref 70–110)
POTASSIUM BLD-SCNC: 3.2 MMOL/L (ref 3.5–5.1)
POTASSIUM SERPL-SCNC: 3.4 MMOL/L (ref 3.5–5.1)
POTASSIUM SERPL-SCNC: 3.5 MMOL/L (ref 3.5–5.1)
POTASSIUM SERPL-SCNC: 4 MMOL/L (ref 3.5–5.1)
PROT SERPL-MCNC: 6.3 G/DL (ref 6–8.4)
PROTHROMBIN TIME: 16.7 SEC (ref 9–12.5)
RBC # BLD AUTO: 2.2 M/UL (ref 4.6–6.2)
SAMPLE: ABNORMAL
SAMPLE: NORMAL
SAMPLE: NORMAL
SITE: ABNORMAL
SITE: NORMAL
SITE: NORMAL
SODIUM BLD-SCNC: 139 MMOL/L (ref 136–145)
SODIUM SERPL-SCNC: 137 MMOL/L (ref 136–145)
SODIUM SERPL-SCNC: 137 MMOL/L (ref 136–145)
SODIUM SERPL-SCNC: 138 MMOL/L (ref 136–145)
SPECIMEN OUTDATE: NORMAL
VANCOMYCIN SERPL-MCNC: 25.7 UG/ML
WBC # BLD AUTO: 19.65 K/UL (ref 3.9–12.7)

## 2023-12-14 PROCEDURE — 99900035 HC TECH TIME PER 15 MIN (STAT)

## 2023-12-14 PROCEDURE — 83735 ASSAY OF MAGNESIUM: CPT | Performed by: STUDENT IN AN ORGANIZED HEALTH CARE EDUCATION/TRAINING PROGRAM

## 2023-12-14 PROCEDURE — 85014 HEMATOCRIT: CPT

## 2023-12-14 PROCEDURE — 99232 SBSQ HOSP IP/OBS MODERATE 35: CPT | Mod: ,,, | Performed by: INTERNAL MEDICINE

## 2023-12-14 PROCEDURE — 25000003 PHARM REV CODE 250

## 2023-12-14 PROCEDURE — 27000248 HC VAD-ADDITIONAL DAY

## 2023-12-14 PROCEDURE — 25000003 PHARM REV CODE 250: Performed by: PHYSICIAN ASSISTANT

## 2023-12-14 PROCEDURE — 92526 ORAL FUNCTION THERAPY: CPT

## 2023-12-14 PROCEDURE — 85025 COMPLETE CBC W/AUTO DIFF WBC: CPT | Performed by: THORACIC SURGERY (CARDIOTHORACIC VASCULAR SURGERY)

## 2023-12-14 PROCEDURE — 82803 BLOOD GASES ANY COMBINATION: CPT

## 2023-12-14 PROCEDURE — 84100 ASSAY OF PHOSPHORUS: CPT | Performed by: THORACIC SURGERY (CARDIOTHORACIC VASCULAR SURGERY)

## 2023-12-14 PROCEDURE — 63600175 PHARM REV CODE 636 W HCPCS: Performed by: NURSE PRACTITIONER

## 2023-12-14 PROCEDURE — 82800 BLOOD PH: CPT

## 2023-12-14 PROCEDURE — 84295 ASSAY OF SERUM SODIUM: CPT

## 2023-12-14 PROCEDURE — 27000646 HC AEROBIKA DEVICE

## 2023-12-14 PROCEDURE — 51798 US URINE CAPACITY MEASURE: CPT

## 2023-12-14 PROCEDURE — 85730 THROMBOPLASTIN TIME PARTIAL: CPT | Performed by: PHYSICIAN ASSISTANT

## 2023-12-14 PROCEDURE — 97112 NEUROMUSCULAR REEDUCATION: CPT

## 2023-12-14 PROCEDURE — 99232 SBSQ HOSP IP/OBS MODERATE 35: CPT | Mod: ,,, | Performed by: NURSE PRACTITIONER

## 2023-12-14 PROCEDURE — 93750 INTERROGATION VAD IN PERSON: CPT | Mod: ,,, | Performed by: INTERNAL MEDICINE

## 2023-12-14 PROCEDURE — 85610 PROTHROMBIN TIME: CPT | Performed by: PHYSICIAN ASSISTANT

## 2023-12-14 PROCEDURE — 27000221 HC OXYGEN, UP TO 24 HOURS

## 2023-12-14 PROCEDURE — 20000000 HC ICU ROOM

## 2023-12-14 PROCEDURE — 80202 ASSAY OF VANCOMYCIN: CPT | Performed by: INTERNAL MEDICINE

## 2023-12-14 PROCEDURE — 83605 ASSAY OF LACTIC ACID: CPT

## 2023-12-14 PROCEDURE — 80048 BASIC METABOLIC PNL TOTAL CA: CPT | Performed by: PHYSICIAN ASSISTANT

## 2023-12-14 PROCEDURE — 37799 UNLISTED PX VASCULAR SURGERY: CPT

## 2023-12-14 PROCEDURE — 82330 ASSAY OF CALCIUM: CPT

## 2023-12-14 PROCEDURE — 25000003 PHARM REV CODE 250: Performed by: STUDENT IN AN ORGANIZED HEALTH CARE EDUCATION/TRAINING PROGRAM

## 2023-12-14 PROCEDURE — C9113 INJ PANTOPRAZOLE SODIUM, VIA: HCPCS | Performed by: PHYSICIAN ASSISTANT

## 2023-12-14 PROCEDURE — 94664 DEMO&/EVAL PT USE INHALER: CPT

## 2023-12-14 PROCEDURE — 94761 N-INVAS EAR/PLS OXIMETRY MLT: CPT | Mod: XB

## 2023-12-14 PROCEDURE — 80076 HEPATIC FUNCTION PANEL: CPT | Performed by: PHYSICIAN ASSISTANT

## 2023-12-14 PROCEDURE — 63600175 PHARM REV CODE 636 W HCPCS: Performed by: THORACIC SURGERY (CARDIOTHORACIC VASCULAR SURGERY)

## 2023-12-14 PROCEDURE — 99233 SBSQ HOSP IP/OBS HIGH 50: CPT | Mod: ,,, | Performed by: STUDENT IN AN ORGANIZED HEALTH CARE EDUCATION/TRAINING PROGRAM

## 2023-12-14 PROCEDURE — 63600175 PHARM REV CODE 636 W HCPCS: Performed by: PHYSICIAN ASSISTANT

## 2023-12-14 PROCEDURE — 86850 RBC ANTIBODY SCREEN: CPT | Performed by: STUDENT IN AN ORGANIZED HEALTH CARE EDUCATION/TRAINING PROGRAM

## 2023-12-14 PROCEDURE — 99291 CRITICAL CARE FIRST HOUR: CPT | Mod: FS,,, | Performed by: PHYSICIAN ASSISTANT

## 2023-12-14 PROCEDURE — 25000003 PHARM REV CODE 250: Performed by: THORACIC SURGERY (CARDIOTHORACIC VASCULAR SURGERY)

## 2023-12-14 PROCEDURE — 83615 LACTATE (LD) (LDH) ENZYME: CPT | Performed by: STUDENT IN AN ORGANIZED HEALTH CARE EDUCATION/TRAINING PROGRAM

## 2023-12-14 PROCEDURE — 80048 BASIC METABOLIC PNL TOTAL CA: CPT | Mod: 91 | Performed by: STUDENT IN AN ORGANIZED HEALTH CARE EDUCATION/TRAINING PROGRAM

## 2023-12-14 PROCEDURE — 84132 ASSAY OF SERUM POTASSIUM: CPT

## 2023-12-14 PROCEDURE — 97530 THERAPEUTIC ACTIVITIES: CPT

## 2023-12-14 RX ORDER — INSULIN ASPART 100 [IU]/ML
6 INJECTION, SOLUTION INTRAVENOUS; SUBCUTANEOUS
Status: DISCONTINUED | OUTPATIENT
Start: 2023-12-14 | End: 2023-12-15

## 2023-12-14 RX ADMIN — SENNOSIDES AND DOCUSATE SODIUM 1 TABLET: 8.6; 5 TABLET ORAL at 08:12

## 2023-12-14 RX ADMIN — MIDODRINE HYDROCHLORIDE 15 MG: 5 TABLET ORAL at 03:12

## 2023-12-14 RX ADMIN — INSULIN ASPART 6 UNITS: 100 INJECTION, SOLUTION INTRAVENOUS; SUBCUTANEOUS at 04:12

## 2023-12-14 RX ADMIN — INSULIN ASPART 2 UNITS: 100 INJECTION, SOLUTION INTRAVENOUS; SUBCUTANEOUS at 12:12

## 2023-12-14 RX ADMIN — DEXTROSE 500 MG: 50 INJECTION, SOLUTION INTRAVENOUS at 01:12

## 2023-12-14 RX ADMIN — QUETIAPINE FUMARATE 50 MG: 25 TABLET ORAL at 08:12

## 2023-12-14 RX ADMIN — ATORVASTATIN CALCIUM 40 MG: 10 TABLET, FILM COATED ORAL at 08:12

## 2023-12-14 RX ADMIN — ACETAMINOPHEN 650 MG: 325 TABLET ORAL at 04:12

## 2023-12-14 RX ADMIN — INSULIN ASPART 4 UNITS: 100 INJECTION, SOLUTION INTRAVENOUS; SUBCUTANEOUS at 04:12

## 2023-12-14 RX ADMIN — MIDODRINE HYDROCHLORIDE 15 MG: 5 TABLET ORAL at 08:12

## 2023-12-14 RX ADMIN — PANTOPRAZOLE SODIUM 40 MG: 40 INJECTION, POWDER, FOR SOLUTION INTRAVENOUS at 08:12

## 2023-12-14 RX ADMIN — INSULIN ASPART 4 UNITS: 100 INJECTION, SOLUTION INTRAVENOUS; SUBCUTANEOUS at 01:12

## 2023-12-14 RX ADMIN — INSULIN ASPART 6 UNITS: 100 INJECTION, SOLUTION INTRAVENOUS; SUBCUTANEOUS at 01:12

## 2023-12-14 RX ADMIN — INSULIN ASPART 6 UNITS: 100 INJECTION, SOLUTION INTRAVENOUS; SUBCUTANEOUS at 08:12

## 2023-12-14 RX ADMIN — CHLOROTHIAZIDE SODIUM 1000 MG: 500 INJECTION, POWDER, LYOPHILIZED, FOR SOLUTION INTRAVENOUS at 02:12

## 2023-12-14 RX ADMIN — AMIODARONE HYDROCHLORIDE 400 MG: 200 TABLET ORAL at 08:12

## 2023-12-14 RX ADMIN — MEROPENEM 2 G: 1 INJECTION INTRAVENOUS at 01:12

## 2023-12-14 RX ADMIN — SODIUM PHOSPHATE, MONOBASIC, MONOHYDRATE AND SODIUM PHOSPHATE, DIBASIC, ANHYDROUS 15 MMOL: 142; 276 INJECTION, SOLUTION INTRAVENOUS at 06:12

## 2023-12-14 RX ADMIN — WARFARIN SODIUM 4 MG: 4 TABLET ORAL at 05:12

## 2023-12-14 RX ADMIN — POLYETHYLENE GLYCOL 3350 17 G: 17 POWDER, FOR SOLUTION ORAL at 08:12

## 2023-12-14 RX ADMIN — FUROSEMIDE 240 MG: 10 INJECTION, SOLUTION INTRAMUSCULAR; INTRAVENOUS at 01:12

## 2023-12-14 NOTE — PROGRESS NOTES
VANCOMYCIN DOSING BY PHARMACY DISCONTINUATION NOTE    Radha Abbott is a 63 y.o. male who had been consulted for vancomycin dosing.    The pharmacy consult for vancomycin dosing has been discontinued.     Vancomycin Dosing by Pharmacy Consult will sign-off. Please reconsult if necessary. Thank you for allowing us to participate in this patient's care.     Alee Rogers, Pharm.D.,[facility]  Phone: [phone]

## 2023-12-14 NOTE — ASSESSMENT & PLAN NOTE
-HeartMate 3 Implanted  12/1/2023  as DT  -HTS Primary  -Implanted by Dr. Washington  -HOLD Coumadin,  Goal INR 2.0-3.0 . Subtherapeutic today. On Heparin gtt   -Antiplatelets Not on ASA  -LDH is stable overall today. Will continue to monitor daily.  -Speed set at  4900   rpm, LSL 4500 rpm   -Interrogation notable for no events  -Not listed for OHTx, declined for OHTX due to comorbidities         Procedure: Device Interrogation Including analysis of device parameters  Current Settings: Ventricular Assist Device  Review of device function is stable/unstable stable        12/14/2023     1:00 PM 12/14/2023    11:00 AM 12/14/2023    10:00 AM 12/14/2023     9:00 AM 12/14/2023     6:00 AM 12/14/2023     5:00 AM 12/14/2023     4:00 AM   TXP LVAD INTERROGATIONS   Type    HeartMate3 HeartMate3 HeartMate3 HeartMate3   Flow 4.1 4.2 4.1 4.2 4.1 4.1 4.1   Speed 4900 4900 4900 4900 4900 4900 4900   PI 4 4.1 4 3.7 3.9 4.1 4.4   Power ([place]) 3.2 3.3 3.3 3.2 3.3 3.2 3.2   LSL 4500 4500 4500 4500 4500 4500 4500   Pulsatility Intermittent pulse Intermittent pulse  Intermittent pulse Intermittent pulse Intermittent pulse Intermittent pulse

## 2023-12-14 NOTE — PROGRESS NOTES
[facility]  Nephrology  Progress Note    Patient Name: Radha Abbott  MRN: [id number]  Admission Date: 11/9/2023  Hospital Length of Stay: 35 days  Attending Provider: Sandhya Price MD   Primary Care Physician: Vasu Kong MD  Principal Problem:LVAD (left ventricular assist device) present    Subjective:     HPI: Mr. Abbott is a 63-year-old man with ischemic cardiomyopathy with most recent TTE showing EF 10 -15% and G3DD s/p [employer] ICM placement on chronic dobutamine infusion, CAD s/p x3 vessel CABG back in 2009, type 2 diabetes mellitus (most recent hemoglobin A1c 7.6%), hypertension, HLD, history of ventricular fibrillation for which he is on amiodarone and CKD IIIb (baseline creatinine ~2-2.2) who was initially admitted on 11/9 with heart failure exacerbation and hypervolemia for which he was managed with inotrope with dobutamine in addition to IV diuresis. He ultimately underwent IABP placed on 11/21 for mechanical hemodynamic support and subsequently underwent LVAD placement on 12/1. His renal function appears to be mostly stable with IVÁN and creatinine in mid 2s to 3s in addition he is still making urine with Lasix infusion however on 12/5 Nephrology consulted given concern for uremia with BUN 72.     Interval History: Patient seen and examined this AM. Wife at bedside. Afebrile with pulse ranging from 100-80s bpm. Systolic blood pressures ranging from 90-70s mmHg via arterial line (weaned off epinephrine). He is saturating 89% on 3 liters via nasal cannula (now just one liter) with no documented UOP in the last 24 hours. Net positive ~765 mL. Plan to continue holding SLED for now with diuresis trial per primary team.    Review of patient's allergies indicates:  No Known Allergies  Current Facility-Administered Medications   Medication Frequency    0.9%  NaCl infusion Continuous    acetaminophen tablet 650 mg Q6H PRN    albuterol sulfate nebulizer solution 2.5 mg Q4H PRN     amiodarone tablet 400 mg Daily    atorvastatin tablet 40 mg Daily    bisacodyL suppository 10 mg Daily PRN    dextrose 10 % infusion Continuous PRN    dextrose 10% bolus 125 mL 125 mL PRN    dextrose 10% bolus 250 mL 250 mL PRN    EPINEPHrine 5 mg in dextrose 5% 250 mL infusion (premix) Continuous    glucagon (human recombinant) injection 1 mg PRN    heparin 25,000 units in dextrose 5% 250 ml (100 units/mL) infusion MINIMAL INTENSITY nomogram - [facility] Continuous    insulin aspart U-100 pen 0-10 Units Q4H PRN    magnesium hydroxide 400 mg/5 ml suspension 2,400 mg Daily PRN    melatonin tablet 6 mg Nightly PRN    meropenem (MERREM) 2 g in sodium chloride 0.9% 100 mL IVPB Q8H    midodrine tablet 15 mg TID    pantoprazole injection 40 mg Daily    polyethylene glycol packet 17 g Daily    potassium chloride 20 mEq in 100 mL IVPB (FOR CENTRAL LINE ADMINISTRATION ONLY) PRN    potassium chloride 20 mEq in 100 mL IVPB (FOR CENTRAL LINE ADMINISTRATION ONLY) PRN    QUEtiapine tablet 50 mg Q6H PRN    senna-docusate 8.6-50 mg per tablet 1 tablet Daily    sodium chloride 0.9% flush 10 mL PRN    sodium phosphate 15 mmol in dextrose 5 % (D5W) 250 mL IVPB Once    vancomycin - pharmacy to dose pharmacy to manage frequency    warfarin (COUMADIN) tablet 4 mg Daily       Objective:     Vital Signs (Most Recent):  Temp: 97.9 °F (36.6 °C) (12/14/23 0300)  Pulse: 90 (12/14/23 0630)  Resp: 15 (12/14/23 0600)  BP: (!) 70/0 (12/14/23 0300)  SpO2: 100 % (Per abg results.) (12/13/23 2208) Vital Signs (24h Range):  Temp:  [97.6 °F (36.4 °C)-98.8 °F (37.1 °C)] 97.9 °F (36.6 °C)  Pulse:  [] 90  Resp:  [12-20] 15  SpO2:  [89 %-100 %] 100 %  BP: (70-78)/(0) 70/0  Arterial Line BP: (72-90)/(51-68) 83/62     Weight: 78.9 kg (174 lb) (12/14/23 8403)  Body mass index is 23.6 kg/m².  Body surface area is 2 meters squared.    I/O last 3 completed shifts:  In: 7203 [I.V.:1475.2; NG/GT:505; IV Piggyback:5222.8]  Out: 6787 [Other:6787]    Physical  Exam  Vitals and nursing note reviewed.   Constitutional:       General: He is sleeping. He is not in acute distress.     Appearance: He is ill-appearing. He is not diaphoretic.      Interventions: Nasal cannula in place.   HENT:      Head: Normocephalic and atraumatic.      Right Ear: External ear normal.      Left Ear: External ear normal.      Nose:      Comments: NG tube to left nostril and nasal cannula in place.     Mouth/Throat:      Mouth: Mucous membranes are moist.      Pharynx: Oropharynx is clear. No oropharyngeal exudate or posterior oropharyngeal erythema.   Eyes:      General: No scleral icterus.        Right eye: No discharge.         Left eye: No discharge.      Extraocular Movements: Extraocular movements intact.      Conjunctiva/sclera: Conjunctivae normal.   Neck:      Comments: Trialysis catheter to left internal jugular vein.   Cardiovascular:      Rate and Rhythm: Normal rate.      Heart sounds: Murmur (humming sound, VAD) heard.      Systolic murmur is present.      No friction rub. No gallop.   Pulmonary:      Breath sounds: Rhonchi present. No wheezing or rales.   Chest:      Comments: LVAD present.   Abdominal:      General: Bowel sounds are normal. There is no distension.      Palpations: Abdomen is soft.   Musculoskeletal:      Cervical back: Neck supple.      Right lower leg: No edema.      Left lower leg: No edema.   Skin:     General: Skin is warm and dry.      Coloration: Skin is not jaundiced.          Significant Labs:  ABGs:   Recent Labs   Lab 12/13/23 2208   PH 7.414   PCO2 34.1*   HCO3 21.8*   POCSATURATED 100   BE -3*       BMP:   Recent Labs   Lab 12/13/23  1700 12/14/23  0258   *  --    *  --    K 4.1  --      --    CO2 20*  --    BUN 10  --    CREATININE 0.9  --    CALCIUM 8.3*  --    MG  --  2.6       CBC:   Recent Labs   Lab 12/14/23  0258   WBC 19.65*   RBC 2.20*   HGB 6.4*   HCT 19.7*      MCV 90   MCH 29.1   MCHC 32.5       CMP:   Recent Labs    Lab 12/13/23  1700 12/14/23  0258   *  --    CALCIUM 8.3*  --    ALBUMIN 2.0* 2.0*   PROT 6.3 6.3   *  --    K 4.1  --    CO2 20*  --      --    BUN 10  --    CREATININE 0.9  --    ALKPHOS 172* 198*   ALT 22 20   AST 41* 40   BILITOT 1.6* 1.6*       Coagulation:   Recent Labs   Lab 12/13/23  1130 12/14/23  0258   INR 1.4*  --    APTT  --  50.7*       LFTs:   Recent Labs   Lab 12/14/23  0258   ALT 20   AST 40   ALKPHOS 198*   BILITOT 1.6*   PROT 6.3   ALBUMIN 2.0*       Microbiology Results (last 7 days)       Procedure Component Value Units Date/Time    Blood culture [[id number]] Collected: 12/10/23 1240    Order Status: Completed Specimen: Blood from Peripheral, Antecubital, Right Updated: 12/13/23 2012     Blood Culture, Routine No Growth to date      No Growth to date      No Growth to date      No Growth to date    Blood culture [[id number]] Collected: 12/10/23 1149    Order Status: Completed Specimen: Blood from Line, Jugular, External Right Updated: 12/13/23 2012     Blood Culture, Routine No Growth to date      No Growth to date      No Growth to date      No Growth to date    Culture, Respiratory with Gram Stain [[id number]] Collected: 12/11/23 1643    Order Status: Completed Specimen: Respiratory from Sputum, Expectorated Updated: 12/13/23 0954     Respiratory Culture Normal respiratory bobby      No S aureus or Pseudomonas isolated.     Gram Stain (Respiratory) <10 epithelial cells per low power field.     Gram Stain (Respiratory) Rare WBC's     Gram Stain (Respiratory) Rare Gram positive cocci    Fungus culture [[id number]] Collected: 12/06/23 1532    Order Status: Completed Specimen: Body Fluid from Lung, Right Updated: 12/12/23 0915     Fungus (Mycology) Culture Culture in progress    Narrative:      Bronchial Wash    Clostridium difficile EIA [[id number]] Collected: 12/12/23 0110    Order Status: Completed Specimen: Stool Updated: 12/12/23 0418     C. diff Antigen Negative     C  difficile Toxins A+B, EIA Negative     Comment: Testing not recommended for children <24 months old.       Culture, Respiratory [[id number]] Collected: 12/06/23 1531    Order Status: Completed Specimen: Respiratory from Bronchial Wash Updated: 12/08/23 1103     Respiratory Culture No growth     Gram Stain (Respiratory) Moderate WBC's     Gram Stain (Respiratory) Rare Gram negative rods    Narrative:      Bronchial Wash    AFB Culture & Smear [[id number]] Collected: 12/06/23 1532    Order Status: Completed Specimen: Body Fluid from Lung, Right Updated: 12/07/23 2127     AFB Culture & Smear Culture in progress     AFB CULTURE STAIN No acid fast bacilli seen.    Narrative:      Bronchial Wash          Specimen (24h ago, onward)      None          Significant Imaging:  I have reviewed all imagining in the last 24 hours.  Assessment/Plan:     Cardiac/Vascular  * LVAD (left ventricular assist device) present  Pre-transplant evaluation for heart transplant  Acute on chronic combined systolic and diastolic CHF, NYHA class 4  Patient is identified as having Combined Systolic and Diastolic heart failure that is Acute on chronic. CHF is currently uncontrolled due to JVD, Rales/crackles on pulmonary exam, and Pulmonary edema/pleural effusion on CXR. Latest ECHO performed and demonstrates- Results for orders placed during the hospital encounter of 11/09/23    Echo    Interpretation Summary    Left Ventricle: The left ventricle is moderately dilated. Mildly increased ventricular mass. Normal wall thickness. There is eccentric hypertrophy. Severe global hyperkinesis present. There is severely reduced systolic function with a visually estimated ejection fraction of 10 -15%. Grade III diastolic dysfunction.    Right Ventricle: Normal right ventricular cavity size. Systolic function is normal. Pacemaker lead present in the ventricle.    Left Atrium: Left atrium is severely dilated.    Mitral Valve: There is annular and bileaflet  tethering. There is moderate regurgitation with a centrally directed jet.    Tricuspid Valve: There is mild regurgitation with a centrally directed jet.    Pulmonary Artery: The estimated pulmonary artery systolic pressure is 41 mmHg.    IVC/SVC: Normal venous pressure at 3 mmHg.    last BNP reviewed- and noted below   Recent Labs   Lab 12/13/23  0316   BNP 1,460*       - management per primary team  - LVAD for mechanical support and epinephrine for ionotropic support  - diuretic trial today    Renal/  Acute renal failure with acute tubular necrosis superimposed on stage 3b chronic kidney disease  - suspect acute tubular injury secondary to hypotension/cardiogenic shock likely compounded by intra-arterial contrast and anemia with urinary sediment with granular casts  - break from RRT today while monitoring UOP and renal function panels, primary team desires to attempt diuretic trial  - recommend giving Lasix 240 mg IV, Diuril 1 gram IV and Diamox 500 mg IV and assess response, if responds can consider scheduling diuretics   - recommend serial metabolic panels at least BID/Q12H in the interim  - renal diet/tube feeds when not NPO, volume restriction per primary team  - strict I/O's and daily weights  - keep MAP > 65 mmHg  - renally all dose medications to eGFR  - avoid nephrotoxic agents wean feasible (i.e. NSAIDs, intra-arterial contrast, supra-therapeutic vancomycin levels, etc.)    Thank you for your consult. I will follow-up with patient. Please contact us if you have any additional questions.    Henok Ayon MD  Nephrology  [facility]    ATTENDING PHYSICIAN ATTESTATION  I have personally verified the history and examined the patient. I thoroughly reviewed the demographic, clinical, laboratorial and imaging information available in medical records. I agree with the assessment and recommendations provided by the subspecialty resident who was under my supervision.

## 2023-12-14 NOTE — ASSESSMENT & PLAN NOTE
Malnutrition Type:  Context: acute illness or injury  Level: moderate    Related to (etiology):   Inadequate nutrition intake    Signs and Symptoms (as evidenced by):   Nutritional intake <75% x 7days, observed orbital wasting and trace edema present.     Malnutrition Characteristic Summary:  Energy Intake (Malnutrition): less than 75% for greater than 7 days  Fluid Accumulation (Malnutrition):  (Trace edema)      Interventions/Recommendations (treatment strategy):  Collaboration of nutritional care with other providers.  EN    Nutrition Diagnosis Status:   New

## 2023-12-14 NOTE — SUBJECTIVE & OBJECTIVE
Interval History: No fevers documented overnight. Pt reports feeling better. Off epi this morning and O2 being weaned down.     Review of Systems   Constitutional:  Negative for chills and fever.   Respiratory:  Negative for cough and shortness of breath.    Gastrointestinal:  Negative for abdominal pain.     Objective:     Vital Signs (Most Recent):  Temp: 97.9 °F (36.6 °C) (12/14/23 0300)  Pulse: 90 (12/14/23 0630)  Resp: 15 (12/14/23 0600)  BP: (!) 70/0 (12/14/23 0300)  SpO2: 100 % (12/14/23 0745) Vital Signs (24h Range):  Temp:  [97.6 °F (36.4 °C)-98.8 °F (37.1 °C)] 97.9 °F (36.6 °C)  Pulse:  [] 90  Resp:  [12-20] 15  SpO2:  [89 %-100 %] 100 %  BP: (70-78)/(0) 70/0  Arterial Line BP: (72-90)/(51-68) 79/62     Weight: 78.9 kg (174 lb)  Body mass index is 23.6 kg/m².    Estimated Creatinine Clearance: 94.6 mL/min (based on SCr of 0.9 mg/dL).     Physical Exam  Constitutional:       General: He is not in acute distress.  HENT:      Right Ear: External ear normal.      Left Ear: External ear normal.      Nose:      Comments: NC  NGT  Eyes:      General:         Right eye: No discharge.         Left eye: No discharge.   Pulmonary:      Effort: Pulmonary effort is normal. No respiratory distress.   Abdominal:      Comments: Abdominal dressing   Musculoskeletal:      Right lower leg: No edema.      Left lower leg: No edema.   Skin:     General: Skin is warm and dry.      Comments: R picc  RIJ       Neurological:      Mental Status: He is alert.          Significant Labs:   Microbiology Results (last 7 days)       Procedure Component Value Units Date/Time    Blood culture [[id number]] Collected: 12/10/23 1240    Order Status: Completed Specimen: Blood from Peripheral, Antecubital, Right Updated: 12/13/23 2012     Blood Culture, Routine No Growth to date      No Growth to date      No Growth to date      No Growth to date    Blood culture [[id number]] Collected: 12/10/23 1149    Order Status: Completed Specimen:  Blood from Line, Jugular, External Right Updated: 12/13/23 2012     Blood Culture, Routine No Growth to date      No Growth to date      No Growth to date      No Growth to date    Culture, Respiratory with Gram Stain [[id number]] Collected: 12/11/23 1643    Order Status: Completed Specimen: Respiratory from Sputum, Expectorated Updated: 12/13/23 0954     Respiratory Culture Normal respiratory bobby      No S aureus or Pseudomonas isolated.     Gram Stain (Respiratory) <10 epithelial cells per low power field.     Gram Stain (Respiratory) Rare WBC's     Gram Stain (Respiratory) Rare Gram positive cocci    Fungus culture [[id number]] Collected: 12/06/23 1532    Order Status: Completed Specimen: Body Fluid from Lung, Right Updated: 12/12/23 0915     Fungus (Mycology) Culture Culture in progress    Narrative:      Bronchial Wash    Clostridium difficile EIA [[id number]] Collected: 12/12/23 0110    Order Status: Completed Specimen: Stool Updated: 12/12/23 0418     C. diff Antigen Negative     C difficile Toxins A+B, EIA Negative     Comment: Testing not recommended for children <24 months old.       Culture, Respiratory [[id number]] Collected: 12/06/23 1531    Order Status: Completed Specimen: Respiratory from Bronchial Wash Updated: 12/08/23 1103     Respiratory Culture No growth     Gram Stain (Respiratory) Moderate WBC's     Gram Stain (Respiratory) Rare Gram negative rods    Narrative:      Bronchial Wash    AFB Culture & Smear [[id number]] Collected: 12/06/23 1532    Order Status: Completed Specimen: Body Fluid from Lung, Right Updated: 12/07/23 2127     AFB Culture & Smear Culture in progress     AFB CULTURE STAIN No acid fast bacilli seen.    Narrative:      Bronchial Wash            Significant Imaging: I have reviewed all pertinent imaging results/findings within the past 24 hours.

## 2023-12-14 NOTE — ASSESSMENT & PLAN NOTE
The patient is a 63 year old male with complex cardiac history including heart failure with recent LVAD placement. He was intubated during this recent admission and has developed left vocal fold immobility causing dysphonia and aspiration of secretions. He is currently on Warfarin, heparin drip, epi drip, and CRRT and therefore would be a high risk surgical candidate. However, he may benefit from bedside injection of the vocal fold to improve/resolve the aspiration risk, especially given that pulmonary hygiene and aspiration would be dangerous to him given his cardiac disease.     - Can consider bedside injection laryngoplasty to left vocal fold  - Recommend continued work with SLP  - Keep NG tube in for nutrition

## 2023-12-14 NOTE — CONSULTS
[facility] - Surgical Intensive Care  Otorhinolaryngology-Head & Neck Surgery  Consult Note    Patient Name: Radha Abbott  MRN: [id number]  Code Status: Full Code  Admission Date: 11/9/2023  Hospital Length of Stay: 35 days  Attending Physician: Sandhya Price MD  Primary Care Provider: Vasu Kong MD    Patient information was obtained from patient and past medical records.     Inpatient consult to ENT  Consult performed by: Maxine Gonsales MD  Consult ordered by: Vero Lozada PA-C        Subjective:     Chief Complaint/Reason for Admission: Dysphonia    History of Present Illness: The patient is a 63 year old male with history of CABG now s/p LVAD placement 12/1/23. He was intubated for 1-2 weeks (unclear exact duration) and was extubated a few days ago. Since extubation, he has a breathy voice, weak cough, and concern for aspirating secretions. He denies any prior issues with his voice or swallowing. He denies any prior neck surgery. He currently has an NG tube in place for nutrition.     Medications:  Continuous Infusions:   sodium chloride 0.9% 5 mL/hr at 12/14/23 1300    dextrose 10 % in water (D10W)      EPINEPHrine Stopped (12/13/23 2344)    heparin (porcine) in D5W 12 Units/kg/hr (12/14/23 1300)     Scheduled Meds:   amiodarone  400 mg Per NG tube Daily    atorvastatin  40 mg Per NG tube Daily    insulin aspart U-100  6 Units Subcutaneous 6 times per day    midodrine  15 mg Oral TID    pantoprazole  40 mg Intravenous Daily    polyethylene glycol  17 g Per NG tube Daily    senna-docusate 8.6-50 mg  1 tablet Per NG tube Daily    warfarin  4 mg Per NG tube Daily     PRN Meds:acetaminophen, albuterol sulfate, bisacodyL, dextrose 10 % in water (D10W), dextrose 10%, dextrose 10%, glucagon (human recombinant), insulin aspart U-100, magnesium hydroxide 400 mg/5 ml, melatonin, potassium chloride, potassium chloride, QUEtiapine, Flushing PICC/Midline Protocol **AND** [DISCONTINUED] sodium  chloride 0.9% **AND** sodium chloride 0.9%     No current facility-administered medications on file prior to encounter.     Current Outpatient Medications on File Prior to Encounter   Medication Sig    amiodarone (PACERONE) 400 MG tablet Take 1 tablet (400 mg total) by mouth once daily.    apixaban (ELIQUIS) 5 mg Tab Take 1 tablet (5 mg total) by mouth 2 (two) times daily.    aspirin (ECOTRIN) 81 MG EC tablet Take 81 mg by mouth once daily.    atorvastatin (LIPITOR) 40 MG tablet Take 40 mg by mouth.    DOBUTamine (DOBUTREX) 1,000 mg/250 mL (4,000 mcg/mL) infusion Inject 389.5 mcg/min into the vein continuous.    fish oil-omega-3 fatty acids 300-1,000 mg capsule Take 2 g by mouth once daily.    furosemide (LASIX) 40 MG tablet Take 1 tablet (40 mg total) by mouth 2 (two) times daily.    gabapentin (NEURONTIN) 300 MG capsule Take 300 mg by mouth nightly as needed.    multivitamin capsule Take 1 capsule by mouth once daily.    pantoprazole (PROTONIX) 40 MG tablet Take 1 tablet (40 mg total) by mouth before breakfast.    potassium chloride SA (K-DUR,KLOR-CON) 20 MEQ tablet Take 1 tablet (20 mEq total) by mouth once daily.    sacubitriL-valsartan (ENTRESTO) 24-26 mg per tablet Take 1 tablet by mouth 2 (two) times daily.    metOLazone (ZAROXOLYN) 2.5 MG tablet Take 1 tablet (2.5 mg total) by mouth once daily. Thursday 11/9 and Saturday 11/10    torsemide (DEMADEX) 20 MG Tab Take 2 tablets (40 mg total) by mouth 2 (two) times a day.       Review of patient's allergies indicates:  No Known Allergies    Past Medical History:   Diagnosis Date    CAD (coronary artery disease)     Diabetes mellitus     HFrEF (heart failure with reduced ejection fraction)     ICD (implantable cardioverter-defibrillator) in place     MI, old      Past Surgical History:   Procedure Laterality Date    ANGIOPLASTY-VENOUS ARTERY Right 12/1/2023    Procedure: ANGIOPLASTY-VENOUS ARTERY, RIGHT FEMORAL;  Surgeon: Yuri Washington MD;  Location: [address]  FLR;  Service: Cardiovascular;  Laterality: Right;    AORTIC VALVULOPLASTY N/A 12/1/2023    Procedure: REPAIR, AORTIC VALVE;  Surgeon: Yuri Washington MD;  Location: [facility] OR 2ND FLR;  Service: Cardiovascular;  Laterality: N/A;    CARDIAC SURGERY      CLOSURE N/A 12/1/2023    Procedure: CLOSURE, TEMPORARY;  Surgeon: Yuri Washington MD;  Location: [facility] OR 2ND FLR;  Service: Cardiovascular;  Laterality: N/A;    DRAINAGE OF PLEURAL EFFUSION  12/4/2023    Procedure: DRAINAGE, PLEURAL EFFUSION;  Surgeon: Yuri Washington MD;  Location: [facility] OR 2ND FLR;  Service: Cardiovascular;;    INSERTION OF GRAFT TO PERICARDIUM  12/4/2023    Procedure: INSERTION, GRAFT, PERICARDIUM;  Surgeon: Yuri Washington MD;  Location: [facility] OR 2ND FLR;  Service: Cardiovascular;;    INSERTION OF INTRA-AORTIC BALLOON ASSIST DEVICE Right 11/21/2023    Procedure: INSERTION, INTRA-AORTIC BALLOON PUMP;  Surgeon: Finn Cohn MD;  Location: [facility] CATH LAB;  Service: Cardiology;  Laterality: Right;    LEFT VENTRICULAR ASSIST DEVICE Left 12/1/2023    Procedure: INSERTION-LEFT VENTRICULAR ASSIST DEVICE;  Surgeon: Yuri Washington MD;  Location: [facility] OR [facility] FLR;  Service: Cardiovascular;  Laterality: Left;  REDO STERNOTOMY - REDO SAW NEEDED FOR CASE    LYSIS OF ADHESIONS  12/1/2023    Procedure: LYSIS, ADHESIONS;  Surgeon: Yuri Washington MD;  Location: [facility] OR [facility] FLR;  Service: Cardiovascular;;    PLACEMENT OF SWAN ROLANDO CATHETER WITH IMAGING GUIDANCE  11/20/2023    Procedure: INSERTION, CATHETER, SWAN-ROLANDO, WITH IMAGING GUIDANCE;  Surgeon: Sajan Hurley MD;  Location: [facility] CATH LAB;  Service: Cardiology;;    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 12/1/2023    Procedure: REPAIR, ANEURYSM, ARTERY, FEMORAL;  Surgeon: Yuri Washington MD;  Location: [facility] OR 2ND FLR;  Service: Cardiovascular;  Laterality: Right;  Right Femoral Artery Repair    RIGHT HEART CATHETERIZATION Right 10/10/2023    Procedure: INSERTION, CATHETER, RIGHT HEART;   Surgeon: Bin Gandhi MD;  Location: [facility] CATH LAB;  Service: Cardiology;  Laterality: Right;    RIGHT HEART CATHETERIZATION Right 10/13/2023    Procedure: INSERTION, CATHETER, RIGHT HEART;  Surgeon: Walter Mcintyre MD;  Location: [facility] CATH LAB;  Service: Cardiology;  Laterality: Right;    RIGHT HEART CATHETERIZATION  11/13/2023    RIGHT HEART CATHETERIZATION Right 11/13/2023    Procedure: INSERTION, CATHETER, RIGHT HEART;  Surgeon: Juventino Bermudez Jr., MD;  Location: [facility] CATH LAB;  Service: Cardiology;  Laterality: Right;    RIGHT HEART CATHETERIZATION Right 11/20/2023    Procedure: INSERTION, CATHETER, RIGHT HEART;  Surgeon: Sajan Hurley MD;  Location: [facility] CATH LAB;  Service: Cardiology;  Laterality: Right;    STERNAL WOUND CLOSURE N/A 12/4/2023    Procedure: CLOSURE, WOUND, STERNUM;  Surgeon: Yuri Washington MD;  [facility];  Service: Cardiovascular;  Laterality: N/A;    STERNOTOMY N/A 12/1/2023    Procedure: STERNOTOMY, REDO;  Surgeon: Yuri Washington MD;  [facility];  Service: Cardiovascular;  Laterality: N/A;    VALVULOPLASTY, MITRAL VALVE N/A 12/1/2023    Procedure: VALVULOPLASTY, MITRAL VALVE;  Surgeon: Yuri Washington MD;  [facility];  Service: Cardiovascular;  Laterality: N/A;     Family History    None       Tobacco Use    Smoking status: Every Day     Current packs/day: 0.50     Types: Cigarettes    Smokeless tobacco: Not on file   Substance and Sexual Activity    Alcohol use: Yes     Comment: rarely    Drug use: No    Sexual activity: Not on file     Review of Systems   Constitutional:  Negative for chills and fever.   HENT:  Positive for trouble swallowing. Negative for sore throat.    Eyes:  Negative for pain and redness.   Respiratory:  Negative for cough and stridor.    Cardiovascular:  Negative for chest pain and palpitations.   Gastrointestinal:  Negative for diarrhea and vomiting.   Endocrine: Negative for cold intolerance and  heat intolerance.   Genitourinary:  Negative for flank pain and hematuria.   Musculoskeletal:  Negative for neck pain and neck stiffness.   Skin:  Negative for pallor and rash.   Allergic/Immunologic: Negative for environmental allergies and immunocompromised state.   Neurological:  Negative for seizures and headaches.   Hematological:  Negative for adenopathy. Does not bruise/bleed easily.   Psychiatric/Behavioral:  Negative for agitation and confusion.      Objective:     Vital Signs (Most Recent):  Temp: 98.2 °F (36.8 °C) (12/14/23 0700)  Pulse: 90 (12/14/23 1500)  Resp: (!) 22 (12/14/23 1300)  BP: (!) 74/0 (12/14/23 1100)  SpO2: 100 % (12/14/23 0745) Vital Signs (24h Range):  Temp:  [97.6 °F (36.4 °C)-98.8 °F (37.1 °C)] 98.2 °F (36.8 °C)  Pulse:  [] 90  Resp:  [12-23] 22  SpO2:  [89 %-100 %] 100 %  BP: (70-78)/(0) 74/0  Arterial Line BP: ()/(51-98) 80/61     Weight: 78.9 kg (174 lb)  Body mass index is 23.6 kg/m².    Date 12/14/23 0700 - 12/15/23 0659   Shift 6107-9909 9833-2444 7684-9898 24 Hour Total   INTAKE   I.V.(mL/kg) 65.5(0.8)   65.5(0.8)   NG/   180   IV Piggyback 250   250   Shift Total(mL/kg) 495.5(6.3)   495.5(6.3)   OUTPUT   Shift Total(mL/kg)       Weight (kg) 78.9 78.9 78.9 78.9        Physical Exam  Awake, alert  Breathing comfortably on room air, no stridor  Voice is breathy, low volume  Neck is soft, central line in place to right neck  Laryngeal landmarks easily palpable  Right tail of parotid mass ~2 cm    Procedure: Flexible laryngoscopy  The flexible laryngoscope was inserted into the nare and advanced to visualize the nasal cavity, nasopharynx, the posterior oropharynx, hypopharynx, and the larynx with the findings noted. The scope was removed and the procedure terminated. The patient tolerated this procedure well without apparent complication.     OVERALL FINDINGS  Nasopharynx - the torus is clear. There are no lesions of the posterior wall.   Oropharynx - no lesions of  the tongue base. There is no obvious fullness or asymmetry.  Hypopharynx - there are no lesions of the pyriform sinuses or postcricoid region   Larynx - there are no lesions of the supraglottic or glottic larynx.  The left vocal fold is immobile with glottic gap on phonation. Pooling of secretions in hypopharynx with aspiration of secretions into trachea       Significant Labs:  CBC:   Recent Labs   Lab 12/14/23 0258   WBC 19.65*   RBC 2.20*   HGB 6.4*   HCT 19.7*      MCV 90   MCH 29.1   MCHC 32.5     CMP:   Recent Labs   Lab 12/14/23 0258 12/14/23  0801   GLU  --  224*   CALCIUM  --  8.3*   ALBUMIN 2.0*  --    PROT 6.3  --    NA  --  137   K  --  4.0   CO2  --  22*   CL  --  103   BUN  --  30*   CREATININE  --  2.1*   ALKPHOS 198*  --    ALT 20  --    AST 40  --    BILITOT 1.6*  --        Significant Diagnostics:  I have reviewed and interpreted all pertinent imaging results/findings within the past 24 hours.    Assessment/Plan:     Vocal cord paralysis, unilateral complete  The patient is a 63 year old male with complex cardiac history including heart failure with recent LVAD placement. He was intubated during this recent admission and has developed left vocal fold immobility causing dysphonia and aspiration of secretions. He is currently on Warfarin, heparin drip, epi drip, and CRRT and therefore would be a high risk surgical candidate. However, he may benefit from bedside injection of the vocal fold to improve/resolve the aspiration risk, especially given that pulmonary hygiene and aspiration would be dangerous to him given his cardiac disease.     - Can consider bedside injection laryngoplasty to left vocal fold  - Recommend continued work with SLP  - Keep NG tube in for nutrition        VTE Risk Mitigation (From admission, onward)           Ordered     warfarin (COUMADIN) tablet 4 mg  Daily         12/12/23 0935     heparin 25,000 units in dextrose 5% 250 ml (100 units/mL) infusion MINIMAL INTENSITY  nomogram - [facility]  Continuous        Question Answer Comment   Heparin Infusion Adjustment (DO NOT MODIFY ANSWER) [email or web address]\[email or web address]\heparin MINIMAL  INTENSITY nomogram for [facility] [email or web address]    Begin at (units/kg/hr) 12        12/12/23 0928     Reason for no Mechanical VTE Prophylaxis  Once        Question:  Reasons:  Answer:  Physician Provided (leave comment)    12/02/23 0032     IP VTE HIGH RISK PATIENT  Once         12/01/23 1552     Place sequential compression device  Until discontinued         12/01/23 1552                      Maxine Gonsales MD  Otorhinolaryngology-Head & Neck Surgery  [facility] - Surgical Intensive Care

## 2023-12-14 NOTE — PROGRESS NOTES
12/14/2023  Deni Frye    Current provider:  Sandhya Price MD    Device interrogation:      12/14/2023     1:00 PM 12/14/2023    11:00 AM 12/14/2023    10:00 AM 12/14/2023     9:00 AM 12/14/2023     6:00 AM 12/14/2023     5:00 AM 12/14/2023     4:00 AM   TXP LVAD INTERROGATIONS   Type    HeartMate3 HeartMate3 HeartMate3 HeartMate3   Flow 4.1 4.2 4.1 4.2 4.1 4.1 4.1   Speed 4900 4900 4900 4900 4900 4900 4900   PI 4 4.1 4 3.7 3.9 4.1 4.4   Power ([place]) 3.2 3.3 3.3 3.2 3.3 3.2 3.2   LSL 4500 4500 4500 4500 4500 4500 4500   Pulsatility Intermittent pulse Intermittent pulse  Intermittent pulse Intermittent pulse Intermittent pulse Intermittent pulse          Rounded on [facility] to ensure all mechanical assist device settings (IABP or VAD) were appropriate and all parameters were within limits.  I was able to ensure all back up equipment was present, the staff had no issues, and the Perfusion Department daily rounding was complete.      For implantable VADs: Interrogation of Ventricular assist device was performed with analysis of device parameters and review of device function. I have personally reviewed the interrogation findings and agree with findings as stated.     In emergency, the nursing units have been notified to contact the perfusion department either by:  Calling [phone] from 630am to 4pm Mon thru Fri, utilizing the On-Call Finder functionality of Epic and searching for Perfusion, or by contacting the hospital  from 4pm to 630am and on weekends and asking to speak with the perfusionist on call.    2:28 PM

## 2023-12-14 NOTE — HPI
The patient is a 63 year old male with history of CABG now s/p LVAD placement 12/1/23. He was intubated for 1-2 weeks (unclear exact duration) and was extubated a few days ago. Since extubation, he has a breathy voice, weak cough, and concern for aspirating secretions. He denies any prior issues with his voice or swallowing. He denies any prior neck surgery. He currently has an NG tube in place for nutrition.

## 2023-12-14 NOTE — ASSESSMENT & PLAN NOTE
-WBC peaked at 33k  -Concern for septic shock. Also on hydrocortisone with increasing pressor requirements, now discontinued   -ID consulted, started on empiric micafungin, meropenem, and vanc  -Cultures remain NGTD  -Completed course for possible pneumonia, will discontinue all antibiotics, ID signed off

## 2023-12-14 NOTE — PT/OT/SLP PROGRESS
Physical Therapy Co-Treatment    Patient Name:  Radha Abbott   MRN:  [id number]  Admitting Diagnosis:  LVAD (left ventricular assist device) present   Recent Surgery: Procedure(s) (LRB):  CLOSURE, WOUND, STERNUM (N/A)  INSERTION, GRAFT, PERICARDIUM  DRAINAGE, PLEURAL EFFUSION 10 Days Post-Op  Admit Date: 11/9/2023  Length of Stay: 35 days    Recommendations:     Discharge Recommendations: High Intensity Therapy  Discharge Equipment Recommendations: to be determined by next level of care   Barriers to discharge:  increased assist needed    Appropriate transfer level with nursing staff: bed level therex    Plan:     During this hospitalization, patient to be seen 6 x/week to address the identified rehab impairments via gait training, therapeutic activities, therapeutic exercises, neuromuscular re-education and progress towards the established goals.  Plan of Care Expires:  01/11/24  Plan of Care Reviewed with: patient, spouse    Assessment     Radha Abbott is a 63 y.o. male admitted with a medical diagnosis of LVAD (left ventricular assist device) present. Pt tolerated session well today. He showed good improvements in endurance and activity tolerance as he was able to progress from sitting activity to x2 stands on this date. He still demo's diffuse weakness and deconditioning, however, and requires two persons to safely complete all mobility. Pt will continue to benefit from skilled PT services during this hospital admit to continue to improve transfer ability and efficiency as well as continue to progress pt's ambulation distance and cardiopulmonary endurance to maximize pt's functional independence and return to PLOF. Patient has demonstrated sufficient progression to warrant high intensity therapy evidenced by objectives noted below.    Problem List: weakness, impaired endurance, impaired self care skills, gait instability, impaired functional mobility, impaired balance, decreased upper extremity function, decreased  lower extremity function, decreased safety awareness, pain, impaired coordination, impaired fine motor, impaired skin, impaired cardiopulmonary response to activity.  Rehab Prognosis: Good; patient would benefit from acute skilled PT services to address these deficits and reach maximum level of function.      Goals:   Multidisciplinary Problems       Physical Therapy Goals          Problem: Physical Therapy    Goal Priority Disciplines Outcome Goal Variances Interventions   Physical Therapy Goal     PT, PT/OT Ongoing, Progressing     Description: Goals to be met by: 23     Patient will increase functional independence with mobility by performin. Sit to stand transfer with modified independence  2. Bed to chair transfer with supervision  3. Gait  x 1500 feet with supervision using physician approved AD with standing erst breaks prn.   4. Lower extremity exercise program x30 reps per handout, with independence  5. Recite 3/3 sternal precautions and remain complaint with precautions throughout session with no verbal cues                     Multidisciplinary Problems (Resolved)          Problem: Physical Therapy    Goal Priority Disciplines Outcome Goal Variances Interventions   Physical Therapy Goal   (Resolved)     PT, PT/OT Met     Description: Goals to be met by: 23     Patient will increase functional independence with mobility by performin. Evaluate pt's need for physical therapy.                          Subjective     RN notified prior to session. Wife present upon PT entrance into room. Patient agreeable to PT evaluation.    Chief Complaint: pt endorsed fatigue after x 2 stands  Patient/Family Comments/goals: get stronger  Pain/Comfort:  Pain Rating 1: 0/10  Pain Rating Post-Intervention 1: 0/10    Objective:     Additional staff present: OT; OT for cotx due to pt's multiple medical comorbidities and functional deficits req'ing two skilled therapists to appropriately progress pt's  musculoskeletal strength, neuromuscular control, and endurance while taking into consideration severe medical acuity in the ICU    Patient found HOB elevated with: telemetry, blood pressure cuff, pulse ox (continuous), PICC line, arterial line, NG tube, Trialysis   Cognition:   Alert and Cooperative  Patient is oriented to Person, Place, Time, Situation  Command following: Follows multistep verbal commands  Fluency: clear/fluent  General Precautions: Standard, Cardiac fall, LVAD, sternal   Orthopedic Precautions:N/A   Braces: N/A   Body mass index is 23.6 kg/m².  Oxygen Device: Room Air  Vitals: BP (!) 74/0 (BP Location: Left arm, Patient Position: Lying)   Pulse 62   Temp 98.6 °F (37 °C) (Oral)   Resp (!) 22   Ht 6' (1.829 m)   Wt 78.9 kg (174 lb)   SpO2 100%   BMI 23.60 kg/m²     Outcome Measures:  AM-PAC 6 CLICK MOBILITY  Turning over in bed (including adjusting bedclothes, sheets and blankets)?: 2  Sitting down on and standing up from a chair with arms (e.g., wheelchair, bedside commode, etc.): 2  Moving from lying on back to sitting on the side of the bed?: 2  Moving to and from a bed to a chair (including a wheelchair)?: 2  Need to walk in hospital room?: 1     Functional Mobility:    Bed Mobility:   Scooting to HOB via supine bridge: total assistance and 2 persons   Supine to Sit: moderate assistance and 2 persons for LE management and for trunk management; to Lt side of bed  Scooting anteriorly to EOB to have both feet planted on floor: moderate assistance   Sit to Supine: moderate assistance and 2 persons for LE management and for trunk management; to Lt side of bed    Sitting Balance at Edge of Bed:  Static Sitting Balance: Good- : able to accept minimal resistance  Dynamic Sitting Balance: Good- : able to sit unsupported and weight shift across midline minimally  Assistance Level Required: Stand-by Assistance  Time: 15 minutes  Postural deviations noted: slouched posture and rounded  shoulders  Comments: Time EOB focused primarily on tolerance to upright positioning, cardiopulmonary response and endurance for activities, and strength of postural musculature to perform dynamic sitting to prepare for tasks in the home.     Transfers:   Sit <> Stand Transfer: total assistance and of 2 persons with hand-held assist. [id number] from EOB    Standing Balance:  Static Standing Balance: Poor-: requires maximal assistance and UE support to maintain standing balance without loss  Dynamic Standing Balance: Poor : able to stand with moderate assistance and minimally reach ipsilaterally. Unable to cross midline.  Assistance Level Required: Total Assistance  Patient used: hand-held assist   Time: 2 x 20 seconds  Postural deviations noted: slouched posture, rounded shoulders, and forward head    Education:  Time provided for education, counseling and discussion of health disposition in regards to patient's current status  All questions answered within PT scope of practice and to patient's satisfaction  PT role in POC to address current functional deficits  Pt educated on proper body mechanics, safety techniques, and energy conservation with PT facilitation and cueing throughout session  Whiteboard updated with therapist name and pt's current mobility status documented above    Patient left HOB elevated with all lines intact, call button in reach, RN notified, and family present.    Time Tracking:     PT Received On: 12/14/23  PT Start Time: 1042     PT Stop Time: 1105  PT Total Time (min): 23 min     Billable Minutes:   Neuromuscular Re-education 23 minutes    Treatment Type: Treatment  PT/PTA: PT       12/14/2023

## 2023-12-14 NOTE — ASSESSMENT & PLAN NOTE
- suspect acute tubular injury secondary to hypotension/cardiogenic shock likely compounded by intra-arterial contrast and anemia with urinary sediment with granular casts  - break from RRT today while monitoring UOP and renal function panels, primary team desires to attempt diuretic trial  - recommend giving Lasix 240 mg IV, Diuril 1 gram IV and Diamox 500 mg IV and assess response, if responds can consider scheduling diuretics   - recommend serial metabolic panels at least BID/Q12H in the interim  - renal diet/tube feeds when not NPO, volume restriction per primary team  - strict I/O's and daily weights  - keep MAP > 65 mmHg  - renally all dose medications to eGFR  - avoid nephrotoxic agents wean feasible (i.e. NSAIDs, intra-arterial contrast, supra-therapeutic vancomycin levels, etc.)

## 2023-12-14 NOTE — ASSESSMENT & PLAN NOTE
Patient is identified as having Combined Systolic and Diastolic heart failure that is Acute on chronic. CHF is currently uncontrolled due to JVD, Rales/crackles on pulmonary exam, and Pulmonary edema/pleural effusion on CXR. Latest ECHO performed and demonstrates- Results for orders placed during the hospital encounter of 11/09/23    Echo    Interpretation Summary    Left Ventricle: The left ventricle is moderately dilated. Mildly increased ventricular mass. Normal wall thickness. There is eccentric hypertrophy. Severe global hyperkinesis present. There is severely reduced systolic function with a visually estimated ejection fraction of 10 -15%. Grade III diastolic dysfunction.    Right Ventricle: Normal right ventricular cavity size. Systolic function is normal. Pacemaker lead present in the ventricle.    Left Atrium: Left atrium is severely dilated.    Mitral Valve: There is annular and bileaflet tethering. There is moderate regurgitation with a centrally directed jet.    Tricuspid Valve: There is mild regurgitation with a centrally directed jet.    Pulmonary Artery: The estimated pulmonary artery systolic pressure is 41 mmHg.    IVC/SVC: Normal venous pressure at 3 mmHg.    last BNP reviewed- and noted below   Recent Labs   Lab 12/13/23  0316   BNP 1,460*       - management per primary team  - LVAD for mechanical support and epinephrine for ionotropic support  - diuretic trial today

## 2023-12-14 NOTE — ASSESSMENT & PLAN NOTE
IVÁN on CKD2. Baseline Cr of 1.7-2.0.   - Hold ARNi, entresto  -Trend BMPs daily   -Nephrology following   -SLED/SCUF for volume removal on hold since 12/13/23  -Will give diuretic challenge with 240 mg IV Lasix, 1000 mg Diuril, and 500 mg IV Diamox   -Midodrine 15 mg TID

## 2023-12-14 NOTE — PROGRESS NOTES
[facility]  Adult Nutrition  Progress Note    SUMMARY       Recommendations    1. If renal formula warranted for IVÁN: Recommend Novasource renal 40 ml/hr to provide 1920 kcal, 87 g pro, 688 mL water, FWF per MD (96% EEN and 83% of EPN).                 -As pressor requirements increase, rec'd trickle feeds for best tolerance. Avoid holding TF for residuals less than 500mL unless associated with other s/s of intolerance such as N/V/D, abd pain/distention.                 - consider adding BenePRO BID if CRRT continues (PRO needs will be higher), provides 12g PRO.      2. If diarrhea or loose stools occur, consider adding psyllium husk BID to help bulk up stools.      3. RD to monitor wt, tolerance, skin, labs.     Goals: Meet % of EEN/EPN by RD f/u date  Nutrition Goal Status: goal met  Communication of RD Recs:  (POC)    Assessment and Plan    Endocrine  Moderate malnutrition  Malnutrition Type:  Context: acute illness or injury  Level: moderate    Related to (etiology):   Inadequate nutrition intake    Signs and Symptoms (as evidenced by):   Nutritional intake <75% x 7days, observed orbital wasting and trace edema present.     Malnutrition Characteristic Summary:  Energy Intake (Malnutrition): less than 75% for greater than 7 days  Fluid Accumulation (Malnutrition):  (Trace edema)      Interventions/Recommendations (treatment strategy):  Collaboration of nutritional care with other providers.  EN    Nutrition Diagnosis Status:   New      Malnutrition Assessment  Malnutrition Context: acute illness or injury  Malnutrition Level: moderate          Energy Intake (Malnutrition): less than 75% for greater than 7 days  Fluid Accumulation (Malnutrition):  (Trace edema)   Orbital Region (Subcutaneous Fat Loss): mild depletion     Reason for Assessment    Reason For Assessment: RD follow-up  Diagnosis: cardiac disease (CHF/ s/p LVAD on 12/1)  Relevant Medical History: CAD, DMT2, CHF,  PAF  Interdisciplinary Rounds: did not attend  General Information Comments: RD f/u: TF running at 10ml/hr. As per RN staff, pt is tolerating TF well. CRRT paused for the day. Trace edema present. RD to provide VitK nutrition therapy when caregive is present. RD team to continue to monitor and f/u.  Nutrition Discharge Planning: Cardiac diet education provided 11/15- no additional questions for me at this time.    Nutrition Risk Screen    Nutrition Risk Screen: tube feeding or parenteral nutrition    Nutrition/Diet History    Spiritual, Cultural Beliefs, Nondenominational Practices, Values that Affect Care: no  Food Allergies: NKFA    Anthropometrics    Temp: 98.2 °F (36.8 °C)  Height Method: Stated  Height: 6' (182.9 cm)  Height (inches): 72 in  Weight Method: Bed Scale  Weight: 78.9 kg (174 lb)  Weight (lb): 174 lb  Ideal Body Weight (IBW), Male: 178 lb  % Ideal Body Weight, Male (lb): 96.63 %  BMI (Calculated): 23.6  BMI Grade: 25 - 29.9 - overweight       Lab/Procedures/Meds    Pertinent Labs Reviewed: reviewed  Pertinent Labs Comments: BUN: 30, cr: 2.1, GFR: 35.2, gluc: 224, Ca: 8.3, phos: 198, alb: 2.0, CRP: 196.7  Pertinent Medications Reviewed: reviewed  Pertinent Medications Comments: Amiodarone, statin, diuril, pantoprazole, abx, senna-docusate, NaCl, polyethylene glycol, coumadin, epi, heparin    Estimated/Assessed Needs    Weight Used For Calorie Calculations: 78 kg (171 lb 15.3 oz)  Energy Calorie Requirements (kcal): 2029 kcal  Energy Need Method: [place] (MSJ x 1.25)    Protein Requirements: 105g (1.2g/kg)  Weight Used For Protein Calculations: 87.1 kg (192 lb 0.3 oz)    Fluid Requirements (mL): 1 ml/kcal or per MD  RDA Method (mL): 2029    Nutrition Prescription Ordered    Current Diet Order: NPO  Current Nutrition Support Formula Ordered: Novasource Renal  Current Nutrition Support Rate Ordered: 40 (ml)  Current Nutrition Support Frequency Ordered: ml/hr x 24hrs    Evaluation of Received  Nutrient/Fluid Intake    Enteral Calories (kcal): 1920  Enteral Protein (gm): 87  Enteral (Free Water) Fluid (mL): 688  % Kcal Needs: 95%  % Protein Needs: 84%  I/O: -875ml since 12/4  Energy Calories Required: not meeting needs (running at trickle rate)  Protein Required: not meeting needs (running at trickle rate)  Fluid Required:  (Per MD)  Total Fluid Intake (mL/kg): 1 ml/kcal or per MD  Comments: LBM 12/12 (loose)  Tolerance: tolerating  % Intake of Estimated Energy Needs: 0 - 25 %  % Meal Intake: 0 - 25 %    Nutrition Risk    Level of Risk/Frequency of Follow-up:  (RD to f/u x 1-2/week)     Monitor and Evaluation    Food and Nutrient Intake: energy intake, food and beverage intake, enteral nutrition intake, parenteral nutrition intake  Food and Nutrient Adminstration: diet order, enteral and parenteral nutrition administration  Knowledge/Beliefs/Attitudes: food and nutrition knowledge/skill, beliefs and attitudes  Physical Activity and Function: nutrition-related ADLs and IADLs, factors affecting access to physical activity  Anthropometric Measurements: height/length, weight, weight change, body mass index, growth pattern indices/percentile ranks  Biochemical Data, Medical Tests and Procedures: electrolyte and renal panel, gastrointestinal profile, glucose/endocrine profile, inflammatory profile, lipid profile  Nutrition-Focused Physical Findings: overall appearance, extremities, muscles and bones, head and eyes, skin     Nutrition Follow-Up    RD Follow-up?: Yes

## 2023-12-14 NOTE — PT/OT/SLP PROGRESS
Occupational Therapy   Co-Treatment    Name: Radha Abbott  MRN: [id number]  Admitting Diagnosis:  LVAD (left ventricular assist device) present  10 Days Post-Op    Recommendations:     Discharge Recommendations: High Intensity Therapy  Discharge Equipment Recommendations:   (TBD)  Barriers to discharge:  None    Assessment:     Radha Abbott is a 63 y.o. male with a medical diagnosis of LVAD (left ventricular assist device) present.  He presents cooperative and anxious for OOBTC; however, pt with profound weakness requiring significant assistance of two persons to stand. Performance deficits affecting function are weakness, impaired self care skills, impaired functional mobility, impaired endurance, gait instability, decreased upper extremity function, decreased safety awareness, decreased coordination, impaired balance, impaired cardiopulmonary response to activity, impaired fine motor, decreased lower extremity function. Pt benefits from co-treatment with PT to accommodate pt's activity tolerance and progression with therapy.      Rehab Prognosis:  Good; patient would benefit from acute skilled OT services to address these deficits and reach maximum level of function.       Plan:     Patient to be seen 6 x/week to address the above listed problems via self-care/home management, therapeutic activities, therapeutic exercises, neuromuscular re-education  Plan of Care Expires: 01/02/24  Plan of Care Reviewed with: patient, spouse    Subjective     Chief Complaint: weakness  Patient/Family Comments/goals: to go home  Pain/Comfort:  Pain Rating 1: 0/10  Pain Rating Post-Intervention 1: 0/10    Objective:     Communicated with: RN prior to session.  Patient found supine with blood pressure cuff, pulse ox (continuous), telemetry, LVAD, wound vac, NG tube, Trialysis, arterial line upon OT entry to room.    General Precautions: Standard, fall, LVAD, sternal    Orthopedic Precautions:N/A  Braces: N/A  Respiratory Status: Room  air     Occupational Performance:     Bed Mobility:    Patient completed Scooting/Bridging with moderate assistance   Patient completed Supine to Sit with moderate assistance and 2 persons  Patient completed Sit to Supine with moderate assistance and 2 persons     Functional Mobility/Transfers:  Patient completed Sit <> Stand Transfer with maximal assistance and of 2 persons  with  hand-held assist x two trials with pt hunched over and unable to look up  Functional Mobility: NT    Activities of Daily Living:  Grooming: minimum assistance seated EOB      AMPAC 6 Click ADL: 9    Treatment & Education:  Pt ed on OT POC  Pt re-ed on LVAD sternal precautions during ADL  Pt ed on VAD management proper anchor placement and driveline securement for DLES integrity; reason for and performance of Self Test; identifying parts including controller, driveline, power cords, controller pouch, how to retrieve VAD numbers from controller; recording numbers in binder  Pt ed on ROM ex's 3x daily for increased overall strength and endurance  Pt sat EOB with SBA x 10 min while engaged in self-care and ROM ex's    Patient left with bed in chair position with all lines intact, call button in reach, RN notified, and spouse present    GOALS:   Multidisciplinary Problems       Occupational Therapy Goals          Problem: Occupational Therapy    Goal Priority Disciplines Outcome Interventions   Occupational Therapy Goal     OT, PT/OT Ongoing, Progressing    Description: Goals to be met by: 1/2/24     Patient will increase functional independence with ADLs by performing:    UB Dressing with SBA  LE Dressing with Supervision.  Grooming while standing at sink with Supervision.  Toileting from bedside commode with SBA for hygiene and clothing management.   Step transfer to bedside commode with SBA  [place] with VAD management during ADLs                             Time Tracking:     OT Date of Treatment: 12/14/23  OT Start Time: 1042  OT  Stop Time: 1105  OT Total Time (min): 23 min    Billable Minutes:Therapeutic Activity 23 12/14/2023

## 2023-12-14 NOTE — NURSING
SICU PLAN OF CARE NOTE    Dx: LVAD (left ventricular assist device) present    Goals of Care:   MAP 70-80    Vital Signs (last 12 hours):   Temp:  [97.6 °F (36.4 °C)-98 °F (36.7 °C)]   Pulse:  []   Resp:  [12-19]   BP: (70-72)/(0)   SpO2:  [89 %-100 %]   Arterial Line BP: (72-90)/(51-68)      Neuro: AAO x4, Follows Commands, and Moves All Extremities    Cardiac: NSR    Respiratory: Nasal Cannula 3L    Gtts: Heparin @ 12 u/kg/hr    Urine Output: Anuric    Drains: NG/OG Tube Tube feeds @ 10 cc /  shift    Diet: Tube Feeds NPO    VAD    Speed: 4900  Flow: 3.8-4.2  PI: 3.2-4.4  Power: 3.2-3.4    Labs/Accuchecks: Daily labs. Q4 accuchecks.    Skin:  All skin remains free from injury.  Patient turned Q2 and PRN.     Shift Events:  Epi titrated off. Phos replaced. CRRT held overnight. PRN insulin given as ordered. Linen changed, bed bath given. See flowsheet for further assessment/details.  Family updated on current condition/plan of care, questions answered, and emotional support provided.  MD updated on current condition, vitals, labs, and gtts.  No new orders received, will continue to monitor. Plans to diurese in AM

## 2023-12-14 NOTE — PROGRESS NOTES
[facility] - [facility]  Endocrinology  Progress Note    Admit Date: 11/9/2023     Reason for Consult: Management of T2DM, Hyperglycemia     Surgical Procedure and Date: n/a    Diabetes diagnosis year: 1998    Home Diabetes Medications:  Metformin (off since October)   Lab Results   Component Value Date    HGBA1C 7.6 (H) 10/12/2023       How often checking glucose at home?  Once daily in the AM    BG readings on regimen: 150-160s  Hypoglycemia on the regimen?  No  Missed doses on regimen?  n/a    Diabetes Complications include:     Hyperglycemia    Complicating diabetes co morbidities:   CAD s/p CABG, HTN, HLD      HPI:   Patient is a 63 y.o. male with a diagnosis of CAD s/p 3v CABG (unclear anatomy, 2009), ICM with a recent EF of 15-20% s/p ICD ([employer] 2009), DM2 (a1c 7.7), HTN, HLD, Vfib on amio who presents to the ED with CC of SOB. In the ED he was AF with stable VS on RA.  CBC showing chronic anemia.  CMP notable for acute on chronic CKD with baseline around 2.1 and Cr now 2.6.  BNP elevated.  Lactate neg.  CXR showing pulmonary edema. He was subsequently admitted to the CCU for diuresis.  Endocrinology consulted for management of T2DM.          Interval HPI:   Overnight events: No acute events overnight. Patient in room 41417/83248 A. Blood glucose stable. BG at and above goal on current insulin regimen (SSI ). Steroid use- None. 10 Days Post-Op  Renal function- Normal   Vasopressors-  None       Endocrine will continue to follow and manage insulin orders inpatient.         Diet NPO Except for: Medication (per NG tube)     Eating:   NPO  Nausea: No  Hypoglycemia and intervention: No  Fever: No  TPN and/or TF: Yes  If yes, type of TF/TPN and rate: TF @ 60 ml/hr    BP (!) 72/0 (BP Location: Left arm, Patient Position: Lying)   Pulse 87   Temp 98.2 °F (36.8 °C) (Oral)   Resp 15   Ht 6' (1.829 m)   Wt 78.9 kg (174 lb)   SpO2 100%   BMI 23.60 kg/m²     Labs Reviewed and Include    Recent Labs  "  Lab 12/14/23  0258 12/14/23  0801   GLU  --  224*   CALCIUM  --  8.3*   ALBUMIN 2.0*  --    PROT 6.3  --    NA  --  137   K  --  4.0   CO2  --  22*   CL  --  103   BUN  --  30*   CREATININE  --  2.1*   ALKPHOS 198*  --    ALT 20  --    AST 40  --    BILITOT 1.6*  --      Lab Results   Component Value Date    WBC 19.65 (H) 12/14/2023    HGB 6.4 (L) 12/14/2023    HCT 19.7 (LL) 12/14/2023    MCV 90 12/14/2023     12/14/2023     No results for input(s): "TSH", "FREET4" in the last 168 hours.  Lab Results   Component Value Date    HGBA1C 7.6 (H) 10/12/2023       Nutritional status:   Body mass index is 23.6 kg/m².  Lab Results   Component Value Date    ALBUMIN 2.0 (L) 12/14/2023    ALBUMIN 2.0 (L) 12/13/2023    ALBUMIN 2.0 (L) 12/13/2023     Lab Results   Component Value Date    PREALBUMIN 12 (L) 12/09/2023    PREALBUMIN 19 (L) 12/01/2023    PREALBUMIN 27 11/15/2023       Estimated Creatinine Clearance: 40.5 mL/min (A) (based on SCr of 2.1 mg/dL (H)).    Accu-Checks  Recent Labs     12/12/23  1717 12/12/23  2005 12/13/23  0007 12/13/23  0407 12/13/23  0819 12/13/23  1135 12/13/23  1705 12/13/23 2006 12/13/23  2359 12/14/23  0401   POCTGLUCOSE 164* 178* 160* 175* 196* 166* 231* 147* 187* 212*       Current Medications and/or Treatments Impacting Glycemic Control  Immunotherapy:    Immunosuppressants       None          Steroids:   Hormones (From admission, onward)      Start     Stop Route Frequency Ordered    12/13/23 2200  melatonin tablet 6 mg         -- Oral Nightly PRN 12/13/23 2100          Pressors:    Autonomic Drugs (From admission, onward)      Start     Stop Route Frequency Ordered    12/10/23 1045  EPINEPHrine 5 mg in dextrose 5% 250 mL infusion (premix)         -- IV Continuous 12/10/23 0943    12/09/23 2100  midodrine tablet 15 mg         -- Oral 3 times daily 12/09/23 5821          Hyperglycemia/Diabetes Medications:   Antihyperglycemics (From admission, onward)      Start     Stop Route Frequency " Ordered    12/14/23 1200  insulin aspart U-100 pen 6 Units         -- SubQ 6 times per day 12/14/23 0848    12/11/23 1027  insulin aspart U-100 pen 0-10 Units         -- SubQ Every 4 hours PRN 12/11/23 0927            ASSESSMENT and PLAN    Cardiac/Vascular  * LVAD (left ventricular assist device) present  Optimize BG control to improve wound healing        PAF (paroxysmal atrial fibrillation)  May increase insulin resistance.         Acute on chronic combined systolic and diastolic CHF, NYHA class 4  Managed per primary team  Optimize BG control  S/p LVAD     Renal/  Acute renal failure with acute tubular necrosis superimposed on stage 3b chronic kidney disease  Lab Results   Component Value Date    CREATININE 2.1 (H) 12/14/2023     Avoid insulin stacking  Titrate insulin slowly       Endocrine  Type 2 diabetes mellitus without complication, without long-term current use of insulin  BG goal 140-180.      - Resume Novolog 6 units with scheduled TF. (Hold if TF is stopped or if BG < 100 mg/dL); resumed due to BG excursions on TF.   - BG checks q4hr while on TF/NPO   - Novolog (aspart) insulin prn for BG excursions MDC (150/25)   - Hypoglycemia protocol in place    ** Please notify Endocrine for any change and/or advance in diet**  ** Please call Endocrine for any BG related issues **    Discharge Planning:   TBD. Please notify endocrinology prior to discharge.                 Erasmo Parrish, DNP, FNP  Endocrinology  [facility] - [facility]

## 2023-12-14 NOTE — ASSESSMENT & PLAN NOTE
64 yo male with LVAD for DT on 12/1, post op complicated by diffuse coagulopathy and RV dysfunction with subsequent chest closure on 12/4. Hospital course complicated by respiratory failure s/p intubation, with bronch on 12/6 notable for RLL mucus plug - cultures no growth to date. Also noted to have increasing leukocytosis suspect initiation of stress dose steroids contributing to rise. CT without acute abscess - though noted to have cortes opacities. ECHO without vegetation and cx data unrevealing so far, resp cx no growth to date. Doing well, now off pressors.  Leukocytosis improving.       Recommendations:  -completed a course for potential pneumonia, favor stopping all antibiotics and monitoring off

## 2023-12-14 NOTE — SUBJECTIVE & OBJECTIVE
"Interval HPI:   Overnight events: No acute events overnight. Patient in room 44258/94216 A. Blood glucose stable. BG at and above goal on current insulin regimen (SSI ). Steroid use- None. 10 Days Post-Op  Renal function- Normal   Vasopressors-  None       Endocrine will continue to follow and manage insulin orders inpatient.         Diet NPO Except for: Medication (per NG tube)     Eating:   NPO  Nausea: No  Hypoglycemia and intervention: No  Fever: No  TPN and/or TF: Yes  If yes, type of TF/TPN and rate: TF @ 60 ml/hr    BP (!) 72/0 (BP Location: Left arm, Patient Position: Lying)   Pulse 87   Temp 98.2 °F (36.8 °C) (Oral)   Resp 15   Ht 6' (1.829 m)   Wt 78.9 kg (174 lb)   SpO2 100%   BMI 23.60 kg/m²     Labs Reviewed and Include    Recent Labs   Lab 12/14/23  0258 12/14/23  0801   GLU  --  224*   CALCIUM  --  8.3*   ALBUMIN 2.0*  --    PROT 6.3  --    NA  --  137   K  --  4.0   CO2  --  22*   CL  --  103   BUN  --  30*   CREATININE  --  2.1*   ALKPHOS 198*  --    ALT 20  --    AST 40  --    BILITOT 1.6*  --      Lab Results   Component Value Date    WBC 19.65 (H) 12/14/2023    HGB 6.4 (L) 12/14/2023    HCT 19.7 (LL) 12/14/2023    MCV 90 12/14/2023     12/14/2023     No results for input(s): "TSH", "FREET4" in the last 168 hours.  Lab Results   Component Value Date    HGBA1C 7.6 (H) 10/12/2023       Nutritional status:   Body mass index is 23.6 kg/m².  Lab Results   Component Value Date    ALBUMIN 2.0 (L) 12/14/2023    ALBUMIN 2.0 (L) 12/13/2023    ALBUMIN 2.0 (L) 12/13/2023     Lab Results   Component Value Date    PREALBUMIN 12 (L) 12/09/2023    PREALBUMIN 19 (L) 12/01/2023    PREALBUMIN 27 11/15/2023       Estimated Creatinine Clearance: 40.5 mL/min (A) (based on SCr of 2.1 mg/dL (H)).    Accu-Checks  Recent Labs     12/12/23  1717 12/12/23  2005 12/13/23  0007 12/13/23  0407 12/13/23  0819 12/13/23  1135 12/13/23  1705 12/13/23 2006 12/13/23  2359 12/14/23  0401   POCTGLUCOSE 164* 178* 160* " 175* 196* 166* 231* 147* 187* 212*       Current Medications and/or Treatments Impacting Glycemic Control  Immunotherapy:    Immunosuppressants       None          Steroids:   Hormones (From admission, onward)      Start     Stop Route Frequency Ordered    12/13/23 2200  melatonin tablet 6 mg         -- Oral Nightly PRN 12/13/23 2100          Pressors:    Autonomic Drugs (From admission, onward)      Start     Stop Route Frequency Ordered    12/10/23 1045  EPINEPHrine 5 mg in dextrose 5% 250 mL infusion (premix)         -- IV Continuous 12/10/23 0943    12/09/23 2100  midodrine tablet 15 mg         -- Oral 3 times daily 12/09/23 1839          Hyperglycemia/Diabetes Medications:   Antihyperglycemics (From admission, onward)      Start     Stop Route Frequency Ordered    12/14/23 1200  insulin aspart U-100 pen 6 Units         -- SubQ 6 times per day 12/14/23 0848    12/11/23 1027  insulin aspart U-100 pen 0-10 Units         -- SubQ Every 4 hours PRN 12/11/23 0927

## 2023-12-14 NOTE — CARE UPDATE
HTS Hemodynamics:   Parameter   at 2000   CVP 6   SvO2 49   CO  5.7   CI 2.8      MAP 71     Current Heart Failure Medications:  - Epinephrine 0.02 mcg/kg/min  - Midodrine    Current Mechanical Circulatory Support:  LVAD HM3 4900 RPM, on heparin gtt    I/Os: holding SLED tonight    Intake/Output Summary (Last 24 hours) at 12/13/2023 1841  Last data filed at 12/13/2023 1800  Gross per 24 hour   Intake 6858.33 ml   Output 6787 ml   Net 71.33 ml     Assessment/Plan:  - Plan was discussed with on-call attending  - Diuretic challenge tomorrow morning  - Epi to 0.01, try to wean off tonight if MAPs>65    Reginald Garcia MD  Cardiovascular Disease PGY-V  [facility]

## 2023-12-14 NOTE — PLAN OF CARE
Recommendations     1. If renal formula warranted for IVÁN: Recommend Novasource renal 40 ml/hr to provide 1920 kcal, 87 g pro, 688 mL water, FWF per MD (96% EEN and 83% of EPN).                 -As pressor requirements increase, rec'd trickle feeds for best tolerance. Avoid holding TF for residuals less than 500mL unless associated with other s/s of intolerance such as N/V/D, abd pain/distention.                 - consider adding BenePRO BID if CRRT continues (PRO needs will be higher), provides 12g PRO.      2. If diarrhea or loose stools occur, consider adding psyllium husk BID to help bulk up stools.      3. RD to monitor wt, tolerance, skin, labs.      Goals: Meet % of EEN/EPN by RD f/u date  Nutrition Goal Status: goal met  Communication of RD Recs:  (POC)

## 2023-12-14 NOTE — ASSESSMENT & PLAN NOTE
Lab Results   Component Value Date    CREATININE 2.1 (H) 12/14/2023     Avoid insulin stacking  Titrate insulin slowly

## 2023-12-14 NOTE — ASSESSMENT & PLAN NOTE
BG goal 140-180.      - Resume Novolog 6 units with scheduled TF. (Hold if TF is stopped or if BG < 100 mg/dL); resumed due to BG excursions on TF.   - BG checks q4hr while on TF/NPO   - Novolog (aspart) insulin prn for BG excursions [facility] (150/25)   - Hypoglycemia protocol in place    ** Please notify Endocrine for any change and/or advance in diet**  ** Please call Endocrine for any BG related issues **    Discharge Planning:   TBD. Please notify endocrinology prior to discharge.           Statement Selected

## 2023-12-14 NOTE — PROGRESS NOTES
Pharmacokinetic Assessment Follow Up: IV Vancomycin    Vancomycin serum concentration assessment/plan:  Random level resulted as 25.7 mcg/mL; Goal 10-20 mcg/mL, Sepsis  Patient was on continuous SLED that was discontinued 12/13 afternoon.   Hold Vancomycin today; re-dose when random level is less than 20 mcg/mL.  Next level to be drawn on 12/15 with AM labs    Drug levels (last 3 results):  Recent Labs   Lab Result Units 12/12/23  0411 12/13/23  0316 12/14/23  0258   Vancomycin, Random ug/mL 10.5 8.3 25.7       Pharmacy will continue to follow and monitor vancomycin.    Please contact pharmacy at extension [phone] for questions regarding this assessment.    Thank you for the consult,   Toma He       Patient brief summary:  Radha Abbott is a 63 y.o. male initiated on antimicrobial therapy with IV Vancomycin for treatment of sepsis    The patient's current regimen is dosing based on level.     Drug Allergies:   Review of patient's allergies indicates:  No Known Allergies    Actual Body Weight:   78.9 kg     Renal Function:   Estimated Creatinine Clearance: 94.6 mL/min (based on SCr of 0.9 mg/dL).,     Dialysis Method (if applicable):  SLED; however, SLED was discontinued 12/13 afternoon. Nephrology following.     CBC (last 72 hours):  Recent Labs   Lab Result Units 12/12/23  0411 12/13/23  0316 12/14/23  0258   WBC K/uL 33.19* 26.53* 19.65*   Hemoglobin g/dL 7.2* 6.8* 6.4*   Hematocrit % 21.8* 21.0* 19.7*   Platelets K/uL 329 358 416   Gran % % 88.7* 89.1* 85.1*   Lymph % % 1.8* 2.0* 3.0*   Mono % % 4.6 5.2 6.3   Eosinophil % % 0.0 0.2 0.6   Basophil % % 0.3 0.2 0.2   Differential Method  Automated Automated Automated       Metabolic Panel (last 72 hours):  Recent Labs   Lab Result Units 12/11/23  1151 12/11/23  1418 12/11/23  1724 12/11/23  2159 12/12/23  0411 12/12/23  1412 12/12/23  2159 12/13/23  0316 12/13/23  1345 12/13/23  1700 12/14/23  0258   Sodium mmol/L  --  137  --  134* 136  136 135* 136 136  136  137 135*  --    Potassium mmol/L 4.2 4.5 4.9 4.4 4.5  4.5 4.4 4.2 4.3  4.2 4.3 4.1  --    Chloride mmol/L  --  102  --  103 104  105 103 103 105  105 105 103  --    CO2 mmol/L  --  24  --  20* 17*  17* 19* 18* 19*  20* 19* 20*  --    Glucose mg/dL  --  146*  --  157* 187*  187* 178* 199* 180*  182* 211* 207*  --    BUN mg/dL  --  10  --  7* 8  7* 8 6* 6*  6* 6* 10  --    Creatinine mg/dL  --  0.9  --  0.7 0.7  0.7 0.6 0.5 0.6  0.6 0.6 0.9  --    Albumin g/dL  --  2.0*  --  2.0* 2.1*  2.1* 2.1* 2.0* 2.1*  2.1*  2.1* 2.0* 2.0* 2.0*   Total Bilirubin mg/dL  --   --   --   --  1.7*  --   --  1.6*  1.6*  --  1.6* 1.6*   Alkaline Phosphatase U/L  --   --   --   --  183*  --   --  171*  173*  --  172* 198*   AST U/L  --   --   --   --  44*  --   --  43*  43*  --  41* 40   ALT U/L  --   --   --   --  25  --   --  24  24  --  22 20   Magnesium mg/dL 1.9 1.9 1.8 2.0 1.9 1.9 2.1 1.8 1.8  --  2.6   Phosphorus mg/dL  --  1.4*  --  2.2* 2.4*  2.3* 2.1* 2.9 1.4*  1.4* 1.8*  1.8*  --  1.7*       Vancomycin Administrations:  vancomycin given in the last 96 hours                     vancomycin 1,500 mg in dextrose 5 % (D5W) 250 mL IVPB (Vial-Mate) (mg) 1,500 mg New Bag 12/13/23 1138    vancomycin 1,500 mg in dextrose 5 % (D5W) 250 mL IVPB (Vial-Mate) (mg) 1,500 mg New Bag 12/12/23 0935    vancomycin 1,250 mg in dextrose 5 % (D5W) 250 mL IVPB (Vial-Mate) (mg) 1,250 mg New Bag 12/11/23 0912    vancomycin 1,500 mg in dextrose 5 % (D5W) 250 mL IVPB (Vial-Mate) (mg) 1,500 mg New Bag 12/10/23 2021                    Microbiologic Results:  Microbiology Results (last 7 days)       Procedure Component Value Units Date/Time    Blood culture [[id number]] Collected: 12/10/23 1240    Order Status: Completed Specimen: Blood from Peripheral, Antecubital, Right Updated: 12/13/23 2012     Blood Culture, Routine No Growth to date      No Growth to date      No Growth to date      No Growth to date    Blood culture [[id number]]  Collected: 12/10/23 1149    Order Status: Completed Specimen: Blood from Line, Jugular, External Right Updated: 12/13/23 2012     Blood Culture, Routine No Growth to date      No Growth to date      No Growth to date      No Growth to date    Culture, Respiratory with Gram Stain [[id number]] Collected: 12/11/23 1643    Order Status: Completed Specimen: Respiratory from Sputum, Expectorated Updated: 12/13/23 0954     Respiratory Culture Normal respiratory bobby      No S aureus or Pseudomonas isolated.     Gram Stain (Respiratory) <10 epithelial cells per low power field.     Gram Stain (Respiratory) Rare WBC's     Gram Stain (Respiratory) Rare Gram positive cocci    Fungus culture [[id number]] Collected: 12/06/23 1532    Order Status: Completed Specimen: Body Fluid from Lung, Right Updated: 12/12/23 0915     Fungus (Mycology) Culture Culture in progress    Narrative:      Bronchial Wash    Clostridium difficile EIA [[id number]] Collected: 12/12/23 0110    Order Status: Completed Specimen: Stool Updated: 12/12/23 0418     C. diff Antigen Negative     C difficile Toxins A+B, EIA Negative     Comment: Testing not recommended for children <24 months old.       Culture, Respiratory [[id number]] Collected: 12/06/23 1531    Order Status: Completed Specimen: Respiratory from Bronchial Wash Updated: 12/08/23 1103     Respiratory Culture No growth     Gram Stain (Respiratory) Moderate WBC's     Gram Stain (Respiratory) Rare Gram negative rods    Narrative:      Bronchial Wash    AFB Culture & Smear [[id number]] Collected: 12/06/23 1532    Order Status: Completed Specimen: Body Fluid from Lung, Right Updated: 12/07/23 2127     AFB Culture & Smear Culture in progress     AFB CULTURE STAIN No acid fast bacilli seen.    Narrative:      Bronchial Wash

## 2023-12-14 NOTE — SUBJECTIVE & OBJECTIVE
Medications:  Continuous Infusions:   sodium chloride 0.9% 5 mL/hr at 12/14/23 1300    dextrose 10 % in water (D10W)      EPINEPHrine Stopped (12/13/23 2344)    heparin (porcine) in D5W 12 Units/kg/hr (12/14/23 1300)     Scheduled Meds:   amiodarone  400 mg Per NG tube Daily    atorvastatin  40 mg Per NG tube Daily    insulin aspart U-100  6 Units Subcutaneous 6 times per day    midodrine  15 mg Oral TID    pantoprazole  40 mg Intravenous Daily    polyethylene glycol  17 g Per NG tube Daily    senna-docusate 8.6-50 mg  1 tablet Per NG tube Daily    warfarin  4 mg Per NG tube Daily     PRN Meds:acetaminophen, albuterol sulfate, bisacodyL, dextrose 10 % in water (D10W), dextrose 10%, dextrose 10%, glucagon (human recombinant), insulin aspart U-100, magnesium hydroxide 400 mg/5 ml, melatonin, potassium chloride, potassium chloride, QUEtiapine, Flushing PICC/Midline Protocol **AND** [DISCONTINUED] sodium chloride 0.9% **AND** sodium chloride 0.9%     No current facility-administered medications on file prior to encounter.     Current Outpatient Medications on File Prior to Encounter   Medication Sig    amiodarone (PACERONE) 400 MG tablet Take 1 tablet (400 mg total) by mouth once daily.    apixaban (ELIQUIS) 5 mg Tab Take 1 tablet (5 mg total) by mouth 2 (two) times daily.    aspirin (ECOTRIN) 81 MG EC tablet Take 81 mg by mouth once daily.    atorvastatin (LIPITOR) 40 MG tablet Take 40 mg by mouth.    DOBUTamine (DOBUTREX) 1,000 mg/250 mL (4,000 mcg/mL) infusion Inject 389.5 mcg/min into the vein continuous.    fish oil-omega-3 fatty acids 300-1,000 mg capsule Take 2 g by mouth once daily.    furosemide (LASIX) 40 MG tablet Take 1 tablet (40 mg total) by mouth 2 (two) times daily.    gabapentin (NEURONTIN) 300 MG capsule Take 300 mg by mouth nightly as needed.    multivitamin capsule Take 1 capsule by mouth once daily.    pantoprazole (PROTONIX) 40 MG tablet Take 1 tablet (40 mg total) by mouth before breakfast.     potassium chloride SA (K-DUR,KLOR-CON) 20 MEQ tablet Take 1 tablet (20 mEq total) by mouth once daily.    sacubitriL-valsartan (ENTRESTO) 24-26 mg per tablet Take 1 tablet by mouth 2 (two) times daily.    metOLazone (ZAROXOLYN) 2.5 MG tablet Take 1 tablet (2.5 mg total) by mouth once daily. Thursday 11/9 and Saturday 11/10    torsemide (DEMADEX) 20 MG Tab Take 2 tablets (40 mg total) by mouth 2 (two) times a day.       Review of patient's allergies indicates:  No Known Allergies    Past Medical History:   Diagnosis Date    CAD (coronary artery disease)     Diabetes mellitus     HFrEF (heart failure with reduced ejection fraction)     ICD (implantable cardioverter-defibrillator) in place     MI, old      Past Surgical History:   Procedure Laterality Date    ANGIOPLASTY-VENOUS ARTERY Right 12/1/2023    Procedure: ANGIOPLASTY-VENOUS ARTERY, RIGHT FEMORAL;  Surgeon: Yuri Washington MD;  Location: [facility] OR [address] [place] [address];  Service: Cardiovascular;  Laterality: Right;    AORTIC VALVULOPLASTY N/A 12/1/2023    Procedure: REPAIR, AORTIC VALVE;  Surgeon: Yuri Washington MD;  [facility];  Service: Cardiovascular;  Laterality: N/A;    CARDIAC SURGERY      CLOSURE N/A 12/1/2023    Procedure: CLOSURE, TEMPORARY;  Surgeon: Yrui Washington MD;  [facility];  Service: Cardiovascular;  Laterality: N/A;    DRAINAGE OF PLEURAL EFFUSION  12/4/2023    Procedure: DRAINAGE, PLEURAL EFFUSION;  Surgeon: Yuri Washington MD;  [facility];  Service: Cardiovascular;;    INSERTION OF GRAFT TO PERICARDIUM  12/4/2023    Procedure: INSERTION, GRAFT, PERICARDIUM;  Surgeon: Yuri Washington MD;  [facility];  Service: Cardiovascular;;    INSERTION OF INTRA-AORTIC BALLOON ASSIST DEVICE Right 11/21/2023    Procedure: INSERTION, INTRA-AORTIC BALLOON PUMP;  Surgeon: Finn Cohn MD;  Location: [facility] CATH LAB;  Service: Cardiology;  Laterality: Right;    LEFT VENTRICULAR ASSIST DEVICE Left 12/1/2023     Procedure: INSERTION-LEFT VENTRICULAR ASSIST DEVICE;  Surgeon: Yuri Washington MD;  Location: [facility] OR [facility];  Service: Cardiovascular;  Laterality: Left;  REDO STERNOTOMY - REDO SAW NEEDED FOR CASE    LYSIS OF ADHESIONS  12/1/2023    Procedure: LYSIS, ADHESIONS;  Surgeon: Yuri Washington MD;  [facility];  Service: Cardiovascular;;    PLACEMENT OF SWAN ROLANDO CATHETER WITH IMAGING GUIDANCE  11/20/2023    Procedure: INSERTION, CATHETER, SWAN-ROLANDO, WITH IMAGING GUIDANCE;  Surgeon: Sajan Hurley MD;  Location: [facility] CATH LAB;  Service: Cardiology;;    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 12/1/2023    Procedure: REPAIR, ANEURYSM, ARTERY, FEMORAL;  Surgeon: Yuri Washington MD;  [facility];  Service: Cardiovascular;  Laterality: Right;  Right Femoral Artery Repair    RIGHT HEART CATHETERIZATION Right 10/10/2023    Procedure: INSERTION, CATHETER, RIGHT HEART;  Surgeon: Bin Gandhi MD;  Location: [facility] CATH LAB;  Service: Cardiology;  Laterality: Right;    RIGHT HEART CATHETERIZATION Right 10/13/2023    Procedure: INSERTION, CATHETER, RIGHT HEART;  Surgeon: Walter Mcintyre MD;  Location: [facility] CATH LAB;  Service: Cardiology;  Laterality: Right;    RIGHT HEART CATHETERIZATION  11/13/2023    RIGHT HEART CATHETERIZATION Right 11/13/2023    Procedure: INSERTION, CATHETER, RIGHT HEART;  Surgeon: Juventino Bermudez Jr., MD;  Location: [facility] CATH LAB;  Service: Cardiology;  Laterality: Right;    RIGHT HEART CATHETERIZATION Right 11/20/2023    Procedure: INSERTION, CATHETER, RIGHT HEART;  Surgeon: Sajan Hurley MD;  Location: [facility] CATH LAB;  Service: Cardiology;  Laterality: Right;    STERNAL WOUND CLOSURE N/A 12/4/2023    Procedure: CLOSURE, WOUND, STERNUM;  Surgeon: Yuri Washington MD;  [address];  Service: Cardiovascular;  Laterality: N/A;    STERNOTOMY N/A 12/1/2023    Procedure: STERNOTOMY, REDO;  Surgeon: Yuri Washington MD;  Location: [facility] OR [address] [place] [address];   Service: Cardiovascular;  Laterality: N/A;    VALVULOPLASTY, MITRAL VALVE N/A 12/1/2023    Procedure: VALVULOPLASTY, MITRAL VALVE;  Surgeon: Yuri Washington MD;  Location: [facility] OR [address] [place] [address];  Service: Cardiovascular;  Laterality: N/A;     Family History    None       Tobacco Use    Smoking status: Every Day     Current packs/day: 0.50     Types: Cigarettes    Smokeless tobacco: Not on file   Substance and Sexual Activity    Alcohol use: Yes     Comment: rarely    Drug use: No    Sexual activity: Not on file     Review of Systems   Constitutional:  Negative for chills and fever.   HENT:  Positive for trouble swallowing. Negative for sore throat.    Eyes:  Negative for pain and redness.   Respiratory:  Negative for cough and stridor.    Cardiovascular:  Negative for chest pain and palpitations.   Gastrointestinal:  Negative for diarrhea and vomiting.   Endocrine: Negative for cold intolerance and heat intolerance.   Genitourinary:  Negative for flank pain and hematuria.   Musculoskeletal:  Negative for neck pain and neck stiffness.   Skin:  Negative for pallor and rash.   Allergic/Immunologic: Negative for environmental allergies and immunocompromised state.   Neurological:  Negative for seizures and headaches.   Hematological:  Negative for adenopathy. Does not bruise/bleed easily.   Psychiatric/Behavioral:  Negative for agitation and confusion.      Objective:     Vital Signs (Most Recent):  Temp: 98.2 °F (36.8 °C) (12/14/23 0700)  Pulse: 90 (12/14/23 1500)  Resp: (!) 22 (12/14/23 1300)  BP: (!) 74/0 (12/14/23 1100)  SpO2: 100 % (12/14/23 0745) Vital Signs (24h Range):  Temp:  [97.6 °F (36.4 °C)-98.8 °F (37.1 °C)] 98.2 °F (36.8 °C)  Pulse:  [] 90  Resp:  [12-23] 22  SpO2:  [89 %-100 %] 100 %  BP: (70-78)/(0) 74/0  Arterial Line BP: ()/(51-98) 80/61     Weight: 78.9 kg (174 lb)  Body mass index is 23.6 kg/m².    Date 12/14/23 0700 - 12/15/23 0659   Shift 2078-2124 1318-2586 2531-2211 24 Hour Total   INTAKE    I.V.(mL/kg) 65.5(0.8)   65.5(0.8)   NG/   180   IV Piggyback 250   250   Shift Total(mL/kg) 495.5(6.3)   495.5(6.3)   OUTPUT   Shift Total(mL/kg)       Weight (kg) 78.9 78.9 78.9 78.9        Physical Exam  Awake, alert  Breathing comfortably on room air, no stridor  Voice is breathy, low volume  Neck is soft, central line in place to right neck  Laryngeal landmarks easily palpable  Right tail of parotid mass ~2 cm    Procedure: Flexible laryngoscopy  The flexible laryngoscope was inserted into the nare and advanced to visualize the nasal cavity, nasopharynx, the posterior oropharynx, hypopharynx, and the larynx with the findings noted. The scope was removed and the procedure terminated. The patient tolerated this procedure well without apparent complication.     OVERALL FINDINGS  Nasopharynx - the torus is clear. There are no lesions of the posterior wall.   Oropharynx - no lesions of the tongue base. There is no obvious fullness or asymmetry.  Hypopharynx - there are no lesions of the pyriform sinuses or postcricoid region   Larynx - there are no lesions of the supraglottic or glottic larynx.  The left vocal fold is immobile with glottic gap on phonation. Pooling of secretions in hypopharynx with aspiration of secretions into trachea       Significant Labs:  CBC:   Recent Labs   Lab 12/14/23 0258   WBC 19.65*   RBC 2.20*   HGB 6.4*   HCT 19.7*      MCV 90   MCH 29.1   MCHC 32.5     CMP:   Recent Labs   Lab 12/14/23 0258 12/14/23  0801   GLU  --  224*   CALCIUM  --  8.3*   ALBUMIN 2.0*  --    PROT 6.3  --    NA  --  137   K  --  4.0   CO2  --  22*   CL  --  103   BUN  --  30*   CREATININE  --  2.1*   ALKPHOS 198*  --    ALT 20  --    AST 40  --    BILITOT 1.6*  --        Significant Diagnostics:  I have reviewed and interpreted all pertinent imaging results/findings within the past 24 hours.

## 2023-12-14 NOTE — SUBJECTIVE & OBJECTIVE
Interval History: Patient seen and examined this AM. Wife at bedside. Afebrile with pulse ranging from 100-80s bpm. Systolic blood pressures ranging from 90-70s mmHg via arterial line (weaned off epinephrine). He is saturating 89% on 3 liters via nasal cannula (now just one liter) with no documented UOP in the last 24 hours. Net positive ~765 mL. Plan to continue holding SLED for now with diuresis trial per primary team.    Review of patient's allergies indicates:  No Known Allergies  Current Facility-Administered Medications   Medication Frequency    0.9%  NaCl infusion Continuous    acetaminophen tablet 650 mg Q6H PRN    albuterol sulfate nebulizer solution 2.5 mg Q4H PRN    amiodarone tablet 400 mg Daily    atorvastatin tablet 40 mg Daily    bisacodyL suppository 10 mg Daily PRN    dextrose 10 % infusion Continuous PRN    dextrose 10% bolus 125 mL 125 mL PRN    dextrose 10% bolus 250 mL 250 mL PRN    EPINEPHrine 5 mg in dextrose 5% 250 mL infusion (premix) Continuous    glucagon (human recombinant) injection 1 mg PRN    heparin 25,000 units in dextrose 5% 250 ml (100 units/mL) infusion MINIMAL INTENSITY nomogram - [facility] Continuous    insulin aspart U-100 pen 0-10 Units Q4H PRN    magnesium hydroxide 400 mg/5 ml suspension 2,400 mg Daily PRN    melatonin tablet 6 mg Nightly PRN    meropenem (MERREM) 2 g in sodium chloride 0.9% 100 mL IVPB Q8H    midodrine tablet 15 mg TID    pantoprazole injection 40 mg Daily    polyethylene glycol packet 17 g Daily    potassium chloride 20 mEq in 100 mL IVPB (FOR CENTRAL LINE ADMINISTRATION ONLY) PRN    potassium chloride 20 mEq in 100 mL IVPB (FOR CENTRAL LINE ADMINISTRATION ONLY) PRN    QUEtiapine tablet 50 mg Q6H PRN    senna-docusate 8.6-50 mg per tablet 1 tablet Daily    sodium chloride 0.9% flush 10 mL PRN    sodium phosphate 15 mmol in dextrose 5 % (D5W) 250 mL IVPB Once    vancomycin - pharmacy to dose pharmacy to manage frequency    warfarin (COUMADIN) tablet 4 mg Daily        Objective:     Vital Signs (Most Recent):  Temp: 97.9 °F (36.6 °C) (12/14/23 0300)  Pulse: 90 (12/14/23 0630)  Resp: 15 (12/14/23 0600)  BP: (!) 70/0 (12/14/23 0300)  SpO2: 100 % (Per abg results.) (12/13/23 2208) Vital Signs (24h Range):  Temp:  [97.6 °F (36.4 °C)-98.8 °F (37.1 °C)] 97.9 °F (36.6 °C)  Pulse:  [] 90  Resp:  [12-20] 15  SpO2:  [89 %-100 %] 100 %  BP: (70-78)/(0) 70/0  Arterial Line BP: (72-90)/(51-68) 83/62     Weight: 78.9 kg (174 lb) (12/14/23 0445)  Body mass index is 23.6 kg/m².  Body surface area is 2 meters squared.    I/O last 3 completed shifts:  In: 7203 [I.V.:1475.2; NG/GT:505; IV Piggyback:5222.8]  Out: 6787 [Other:6787]     Physical Exam  Vitals and nursing note reviewed.   Constitutional:       General: He is sleeping. He is not in acute distress.     Appearance: He is ill-appearing. He is not diaphoretic.      Interventions: Nasal cannula in place.   HENT:      Head: Normocephalic and atraumatic.      Right Ear: External ear normal.      Left Ear: External ear normal.      Nose:      Comments: NG tube to left nostril and nasal cannula in place.     Mouth/Throat:      Mouth: Mucous membranes are moist.      Pharynx: Oropharynx is clear. No oropharyngeal exudate or posterior oropharyngeal erythema.   Eyes:      General: No scleral icterus.        Right eye: No discharge.         Left eye: No discharge.      Extraocular Movements: Extraocular movements intact.      Conjunctiva/sclera: Conjunctivae normal.   Neck:      Comments: Trialysis catheter to left internal jugular vein.   Cardiovascular:      Rate and Rhythm: Normal rate.      Heart sounds: Murmur (humming sound, VAD) heard.      Systolic murmur is present.      No friction rub. No gallop.   Pulmonary:      Breath sounds: Rhonchi present. No wheezing or rales.   Chest:      Comments: LVAD present.   Abdominal:      General: Bowel sounds are normal. There is no distension.      Palpations: Abdomen is soft.    Musculoskeletal:      Cervical back: Neck supple.      Right lower leg: No edema.      Left lower leg: No edema.   Skin:     General: Skin is warm and dry.      Coloration: Skin is not jaundiced.          Significant Labs:  ABGs:   Recent Labs   Lab 12/13/23 2208   PH 7.414   PCO2 34.1*   HCO3 21.8*   POCSATURATED 100   BE -3*       BMP:   Recent Labs   Lab 12/13/23  1700 12/14/23  0258   *  --    *  --    K 4.1  --      --    CO2 20*  --    BUN 10  --    CREATININE 0.9  --    CALCIUM 8.3*  --    MG  --  2.6       CBC:   Recent Labs   Lab 12/14/23 0258   WBC 19.65*   RBC 2.20*   HGB 6.4*   HCT 19.7*      MCV 90   MCH 29.1   MCHC 32.5       CMP:   Recent Labs   Lab 12/13/23  1700 12/14/23  0258   *  --    CALCIUM 8.3*  --    ALBUMIN 2.0* 2.0*   PROT 6.3 6.3   *  --    K 4.1  --    CO2 20*  --      --    BUN 10  --    CREATININE 0.9  --    ALKPHOS 172* 198*   ALT 22 20   AST 41* 40   BILITOT 1.6* 1.6*       Coagulation:   Recent Labs   Lab 12/13/23  1130 12/14/23 0258   INR 1.4*  --    APTT  --  50.7*       LFTs:   Recent Labs   Lab 12/14/23 0258   ALT 20   AST 40   ALKPHOS 198*   BILITOT 1.6*   PROT 6.3   ALBUMIN 2.0*       Microbiology Results (last 7 days)       Procedure Component Value Units Date/Time    Blood culture [[id number]] Collected: 12/10/23 1240    Order Status: Completed Specimen: Blood from Peripheral, Antecubital, Right Updated: 12/13/23 2012     Blood Culture, Routine No Growth to date      No Growth to date      No Growth to date      No Growth to date    Blood culture [[id number]] Collected: 12/10/23 1149    Order Status: Completed Specimen: Blood from Line, Jugular, External Right Updated: 12/13/23 2012     Blood Culture, Routine No Growth to date      No Growth to date      No Growth to date      No Growth to date    Culture, Respiratory with Gram Stain [[id number]] Collected: 12/11/23 1645    Order Status: Completed Specimen: Respiratory from  Sputum, Expectorated Updated: 12/13/23 0954     Respiratory Culture Normal respiratory bobby      No S aureus or Pseudomonas isolated.     Gram Stain (Respiratory) <10 epithelial cells per low power field.     Gram Stain (Respiratory) Rare WBC's     Gram Stain (Respiratory) Rare Gram positive cocci    Fungus culture [[id number]] Collected: 12/06/23 1532    Order Status: Completed Specimen: Body Fluid from Lung, Right Updated: 12/12/23 0915     Fungus (Mycology) Culture Culture in progress    Narrative:      Bronchial Wash    Clostridium difficile EIA [[id number]] Collected: 12/12/23 0110    Order Status: Completed Specimen: Stool Updated: 12/12/23 0418     C. diff Antigen Negative     C difficile Toxins A+B, EIA Negative     Comment: Testing not recommended for children <24 months old.       Culture, Respiratory [[id number]] Collected: 12/06/23 1531    Order Status: Completed Specimen: Respiratory from Bronchial Wash Updated: 12/08/23 1103     Respiratory Culture No growth     Gram Stain (Respiratory) Moderate WBC's     Gram Stain (Respiratory) Rare Gram negative rods    Narrative:      Bronchial Wash    AFB Culture & Smear [[id number]] Collected: 12/06/23 1532    Order Status: Completed Specimen: Body Fluid from Lung, Right Updated: 12/07/23 2127     AFB Culture & Smear Culture in progress     AFB CULTURE STAIN No acid fast bacilli seen.    Narrative:      Bronchial Wash          Specimen (24h ago, onward)      None          Significant Imaging:  I have reviewed all imagining in the last 24 hours.

## 2023-12-14 NOTE — PT/OT/SLP PROGRESS
Speech Language Pathology Treatment    Patient Name:  Radha Abbott   MRN:  [id number]  Admitting Diagnosis: LVAD (left ventricular assist device) present    Recommendations:                 General Recommendations:  ENT evaluation, Dysphagia therapy, and Modified barium swallow study  Diet recommendations:  NPO, Liquid Diet Level: NPO   Aspiration Precautions: Continue alternate means of nutrition and Strict aspiration precautions   General Precautions: Standard, LVAD, fall, sternal  Communication strategies:  provide increased time to answer    Assessment:     Radha Abbott is a 63 y.o. male with an SLP diagnosis of Dysphagia.     Subjective     Awake/alert  Spouse at bedside  RN at the bedside     Pain/Comfort:  Pain Rating 1: 0/10  Pain Rating Post-Intervention 1: 0/10      Objective:     Has the patient been evaluated by SLP for swallowing?   Yes  Keep patient NPO? No     Pt repositioned upright in bed for PO trials. Pt with wet dysphonic vocal quality upon phonation. Pt more alert compared to previous date of service,. Ng tube remains in place. SLP discussed with pt and spouse at length ongoing concerns for aspiration of secretions and no PO trials offered this date with concern for ongoing inadequate airway protection. At this point in time, it would be most beneficial for specialty service to evaluate laryngeal anatomy and physiology. Pt  had undergoing multiple prolonged intubations and voice has not yet significantly improved since extubation and remains severely dysphonic. Pt continues to exhibit a weak wet unproductive cough. Pt does not yet appear ready for MBS given evident clinical signs of aspiration with all PO previous trials. Following additional laryngeal assessment pt will warrant and MBS to help establish least restrictive diet.  Both pt and spouse verbalized understanding.     Goals:   Multidisciplinary Problems       SLP Goals          Problem: SLP    Goal Priority Disciplines Outcome   SLP Goal      SLP    Description: Speech Language Pathology Goals  Goals expected to be met by 12/24    1. Pt will participate in ongoing assessment of swallow function to help determine least restrictive diet                            Plan:     Patient to be seen:  4 x/week   Plan of Care reviewed with:  patient, spouse   SLP Follow-Up:  Yes       Discharge recommendations:    TBD      Time Tracking:     SLP Treatment Date:   12/14/23  Speech Start Time:  0747  Speech Stop Time:  0754     Speech Total Time (min):  7 min    Billable Minutes: Treatment Swallowing Dysfunction 7    12/14/2023

## 2023-12-14 NOTE — SUBJECTIVE & OBJECTIVE
Interval History: POD #13 s/p HM3 LVAD implant. Successfully weaned off epi overnight. CVP 7, SVO2 65%, CO 9.1, CI 4.6, . Neph following, has been off CRRT since yesterday. Will give diuretic challenge with 240 mg IV Lasix, 1 g IV Diuril, and 500 mg IV Diamox. WBC downtrending, has completed antibiotic course for possible pneumonia. ID recommends discontinuing all antibiotics. ENT consulted for evaluation of severe dysphonia, weak cough, and high aspiration risk.     Continuous Infusions:   sodium chloride 0.9% 5 mL/hr at 12/14/23 1300    dextrose 10 % in water (D10W)      EPINEPHrine Stopped (12/13/23 2344)    heparin (porcine) in D5W 12 Units/kg/hr (12/14/23 1300)     Scheduled Meds:   amiodarone  400 mg Per NG tube Daily    atorvastatin  40 mg Per NG tube Daily    chlorothiazide (DIURIL) 1,000 mg in dextrose 5 % (D5W) 50 mL IVPB  1,000 mg Intravenous Once    insulin aspart U-100  6 Units Subcutaneous 6 times per day    midodrine  15 mg Oral TID    pantoprazole  40 mg Intravenous Daily    polyethylene glycol  17 g Per NG tube Daily    senna-docusate 8.6-50 mg  1 tablet Per NG tube Daily    warfarin  4 mg Per NG tube Daily     PRN Meds:acetaminophen, albuterol sulfate, bisacodyL, dextrose 10 % in water (D10W), dextrose 10%, dextrose 10%, glucagon (human recombinant), insulin aspart U-100, magnesium hydroxide 400 mg/5 ml, melatonin, potassium chloride, potassium chloride, QUEtiapine, Flushing PICC/Midline Protocol **AND** [DISCONTINUED] sodium chloride 0.9% **AND** sodium chloride 0.9%    Review of patient's allergies indicates:  No Known Allergies  Objective:     Vital Signs (Most Recent):  Temp: 98.2 °F (36.8 °C) (12/14/23 0700)  Pulse: (!) 112 (12/14/23 1315)  Resp: (!) 22 (12/14/23 1300)  BP: (!) 74/0 (12/14/23 1100)  SpO2: 100 % (12/14/23 0745) Vital Signs (24h Range):  Temp:  [97.6 °F (36.4 °C)-98.8 °F (37.1 °C)] 98.2 °F (36.8 °C)  Pulse:  [] 112  Resp:  [12-23] 22  SpO2:  [89 %-100 %] 100 %  BP:  (70-78)/(0) 74/0  Arterial Line BP: ()/(51-98) 80/61     Patient Vitals for the past 72 hrs (Last 3 readings):   Weight   12/14/23 0445 78.9 kg (174 lb)   12/13/23 0228 78.5 kg (173 lb)   12/12/23 0500 78.5 kg (173 lb)       Body mass index is 23.6 kg/m².      Intake/Output Summary (Last 24 hours) at 12/14/2023 1428  Last data filed at 12/14/2023 1300  Gross per 24 hour   Intake 1167.37 ml   Output --   Net 1167.37 ml         Hemodynamic Parameters:       Telemetry: NSR        Physical Exam  Constitutional:       Appearance: He is ill-appearing.   HENT:      Head: Normocephalic and atraumatic.   Eyes:      Pupils: Pupils are equal, round, and reactive to light.   Cardiovascular:      Rate and Rhythm: Normal rate and regular rhythm.      Comments: LVAD hum  Pulmonary:      Breath sounds: Rhonchi and rales present.   Abdominal:      General: Abdomen is flat. Bowel sounds are normal. There is no distension.      Palpations: Abdomen is soft.      Tenderness: There is no abdominal tenderness.   Musculoskeletal:         General: Normal range of motion.      Cervical back: Neck supple.   Skin:     General: Skin is warm and dry.   Neurological:      Comments: Awake and oriented.             Significant Labs:  CBC:  Recent Labs   Lab 12/12/23  0411 12/12/23  2340 12/13/23  0316 12/13/23  0825 12/13/23  1441 12/14/23  0258   WBC 33.19*  --  26.53*  --   --  19.65*   RBC 2.39*  --  2.32*  --   --  2.20*   HGB 7.2*  --  6.8*  --   --  6.4*   HCT 21.8*   < > 21.0* 34* 23* 19.7*     --  358  --   --  416   MCV 91  --  91  --   --  90   MCH 30.1  --  29.3  --   --  29.1   MCHC 33.0  --  32.4  --   --  32.5    < > = values in this interval not displayed.       BNP:  Recent Labs   Lab 12/08/23  0408 12/11/23  0405 12/13/23  0316   * 2,154* 1,460*       CMP:  Recent Labs   Lab 12/13/23  0316 12/13/23  1345 12/13/23  1700 12/14/23  0258 12/14/23  0801   *  182* 211* 207*  --  224*   CALCIUM 8.4*  8.4* 8.6*  8.3*  --  8.3*   ALBUMIN 2.1*  2.1*  2.1* 2.0* 2.0* 2.0*  --    PROT 6.4  6.4  --  6.3 6.3  --      136 137 135*  --  137   K 4.3  4.2 4.3 4.1  --  4.0   CO2 19*  20* 19* 20*  --  22*     105 105 103  --  103   BUN 6*  6* 6* 10  --  30*   CREATININE 0.6  0.6 0.6 0.9  --  2.1*   ALKPHOS 171*  173*  --  172* 198*  --    ALT 24  24  --  22 20  --    AST 43*  43*  --  41* 40  --    BILITOT 1.6*  1.6*  --  1.6* 1.6*  --         Coagulation:   Recent Labs   Lab 12/12/23  0607 12/12/23  1719 12/12/23  2309 12/13/23  0316 12/13/23  1130 12/14/23  0258 12/14/23  0801   INR 1.2  --   --   --  1.4*  --  1.6*   APTT  --    < > 45.1* 41.2*  --  50.7*  --     < > = values in this interval not displayed.       LDH:  Recent Labs   Lab 12/12/23  0411 12/13/23  0316 12/14/23  0258   * 609* 565*       Microbiology:  Microbiology Results (last 7 days)       Procedure Component Value Units Date/Time    Blood culture [[id number]] Collected: 12/10/23 1240    Order Status: Completed Specimen: Blood from Peripheral, Antecubital, Right Updated: 12/13/23 2012     Blood Culture, Routine No Growth to date      No Growth to date      No Growth to date      No Growth to date    Blood culture [[id number]] Collected: 12/10/23 1149    Order Status: Completed Specimen: Blood from Line, Jugular, External Right Updated: 12/13/23 2012     Blood Culture, Routine No Growth to date      No Growth to date      No Growth to date      No Growth to date    Culture, Respiratory with Gram Stain [[id number]] Collected: 12/11/23 1643    Order Status: Completed Specimen: Respiratory from Sputum, Expectorated Updated: 12/13/23 0954     Respiratory Culture Normal respiratory bobby      No S aureus or Pseudomonas isolated.     Gram Stain (Respiratory) <10 epithelial cells per low power field.     Gram Stain (Respiratory) Rare WBC's     Gram Stain (Respiratory) Rare Gram positive cocci    Fungus culture [[id number]] Collected: 12/06/23  1532    Order Status: Completed Specimen: Body Fluid from Lung, Right Updated: 12/12/23 0915     Fungus (Mycology) Culture Culture in progress    Narrative:      Bronchial Wash    Clostridium difficile EIA [[id number]] Collected: 12/12/23 0110    Order Status: Completed Specimen: Stool Updated: 12/12/23 0418     C. diff Antigen Negative     C difficile Toxins A+B, EIA Negative     Comment: Testing not recommended for children <24 months old.       Culture, Respiratory [[id number]] Collected: 12/06/23 1531    Order Status: Completed Specimen: Respiratory from Bronchial Wash Updated: 12/08/23 1103     Respiratory Culture No growth     Gram Stain (Respiratory) Moderate WBC's     Gram Stain (Respiratory) Rare Gram negative rods    Narrative:      Bronchial Wash    AFB Culture & Smear [[id number]] Collected: 12/06/23 1532    Order Status: Completed Specimen: Body Fluid from Lung, Right Updated: 12/07/23 2127     AFB Culture & Smear Culture in progress     AFB CULTURE STAIN No acid fast bacilli seen.    Narrative:      Bronchial Wash            BMP:   Recent Labs   Lab 12/14/23  0258 12/14/23  0801   GLU  --  224*   NA  --  137   K  --  4.0   CL  --  103   CO2  --  22*   BUN  --  30*   CREATININE  --  2.1*   CALCIUM  --  8.3*   MG 2.6  --        I have reviewed all pertinent labs within the past 24 hours.    Estimated Creatinine Clearance: 40.5 mL/min (A) (based on SCr of 2.1 mg/dL (H)).    Diagnostic Results:  I have reviewed all pertinent imaging results/findings within the past 24 hours.

## 2023-12-14 NOTE — PROGRESS NOTES
[facility] - Surgical Intensive Care  Heart Transplant  Progress Note    Patient Name: Radha Abbott  MRN: [id number]  Admission Date: 11/9/2023  Hospital Length of Stay: 35 days  Attending Physician: Sandhya Price MD  Primary Care Provider: Vasu Kong MD  Principal Problem:LVAD (left ventricular assist device) present    Subjective:   Interval History: POD #13 s/p HM3 LVAD implant. Successfully weaned off epi overnight. CVP 7, SVO2 65%, CO 9.1, CI 4.6, . Neph following, has been off CRRT since yesterday. Will give diuretic challenge with 240 mg IV Lasix, 1 g IV Diuril, and 500 mg IV Diamox. WBC downtrending, has completed antibiotic course for possible pneumonia. ID recommends discontinuing all antibiotics. ENT consulted for evaluation of severe dysphonia, weak cough, and high aspiration risk.     Continuous Infusions:   sodium chloride 0.9% 5 mL/hr at 12/14/23 1300    dextrose 10 % in water (D10W)      EPINEPHrine Stopped (12/13/23 2344)    heparin (porcine) in D5W 12 Units/kg/hr (12/14/23 1300)     Scheduled Meds:   amiodarone  400 mg Per NG tube Daily    atorvastatin  40 mg Per NG tube Daily    chlorothiazide (DIURIL) 1,000 mg in dextrose 5 % (D5W) 50 mL IVPB  1,000 mg Intravenous Once    insulin aspart U-100  6 Units Subcutaneous 6 times per day    midodrine  15 mg Oral TID    pantoprazole  40 mg Intravenous Daily    polyethylene glycol  17 g Per NG tube Daily    senna-docusate 8.6-50 mg  1 tablet Per NG tube Daily    warfarin  4 mg Per NG tube Daily     PRN Meds:acetaminophen, albuterol sulfate, bisacodyL, dextrose 10 % in water (D10W), dextrose 10%, dextrose 10%, glucagon (human recombinant), insulin aspart U-100, magnesium hydroxide 400 mg/5 ml, melatonin, potassium chloride, potassium chloride, QUEtiapine, Flushing PICC/Midline Protocol **AND** [DISCONTINUED] sodium chloride 0.9% **AND** sodium chloride 0.9%    Review of patient's allergies indicates:  No Known Allergies  Objective:     Vital  Signs (Most Recent):  Temp: 98.2 °F (36.8 °C) (12/14/23 0700)  Pulse: (!) 112 (12/14/23 1315)  Resp: (!) 22 (12/14/23 1300)  BP: (!) 74/0 (12/14/23 1100)  SpO2: 100 % (12/14/23 0745) Vital Signs (24h Range):  Temp:  [97.6 °F (36.4 °C)-98.8 °F (37.1 °C)] 98.2 °F (36.8 °C)  Pulse:  [] 112  Resp:  [12-23] 22  SpO2:  [89 %-100 %] 100 %  BP: (70-78)/(0) 74/0  Arterial Line BP: ()/(51-98) 80/61     Patient Vitals for the past 72 hrs (Last 3 readings):   Weight   12/14/23 0445 78.9 kg (174 lb)   12/13/23 0228 78.5 kg (173 lb)   12/12/23 0500 78.5 kg (173 lb)       Body mass index is 23.6 kg/m².      Intake/Output Summary (Last 24 hours) at 12/14/2023 1428  Last data filed at 12/14/2023 1300  Gross per 24 hour   Intake 1167.37 ml   Output --   Net 1167.37 ml         Hemodynamic Parameters:       Telemetry: NSR        Physical Exam  Constitutional:       Appearance: He is ill-appearing.   HENT:      Head: Normocephalic and atraumatic.   Eyes:      Pupils: Pupils are equal, round, and reactive to light.   Cardiovascular:      Rate and Rhythm: Normal rate and regular rhythm.      Comments: LVAD hum  Pulmonary:      Breath sounds: Rhonchi and rales present.   Abdominal:      General: Abdomen is flat. Bowel sounds are normal. There is no distension.      Palpations: Abdomen is soft.      Tenderness: There is no abdominal tenderness.   Musculoskeletal:         General: Normal range of motion.      Cervical back: Neck supple.   Skin:     General: Skin is warm and dry.   Neurological:      Comments: Awake and oriented.             Significant Labs:  CBC:  Recent Labs   Lab 12/12/23  0411 12/12/23  2340 12/13/23  0316 12/13/23  0825 12/13/23  1441 12/14/23  0258   WBC 33.19*  --  26.53*  --   --  19.65*   RBC 2.39*  --  2.32*  --   --  2.20*   HGB 7.2*  --  6.8*  --   --  6.4*   HCT 21.8*   < > 21.0* 34* 23* 19.7*     --  358  --   --  416   MCV 91  --  91  --   --  90   MCH 30.1  --  29.3  --   --  29.1   MCHC  33.0  --  32.4  --   --  32.5    < > = values in this interval not displayed.       BNP:  Recent Labs   Lab 12/08/23  0408 12/11/23  0405 12/13/23 0316   * 2,154* 1,460*       CMP:  Recent Labs   Lab 12/13/23  0316 12/13/23  1345 12/13/23  1700 12/14/23  0258 12/14/23  0801   *  182* 211* 207*  --  224*   CALCIUM 8.4*  8.4* 8.6* 8.3*  --  8.3*   ALBUMIN 2.1*  2.1*  2.1* 2.0* 2.0* 2.0*  --    PROT 6.4  6.4  --  6.3 6.3  --      136 137 135*  --  137   K 4.3  4.2 4.3 4.1  --  4.0   CO2 19*  20* 19* 20*  --  22*     105 105 103  --  103   BUN 6*  6* 6* 10  --  30*   CREATININE 0.6  0.6 0.6 0.9  --  2.1*   ALKPHOS 171*  173*  --  172* 198*  --    ALT 24  24  --  22 20  --    AST 43*  43*  --  41* 40  --    BILITOT 1.6*  1.6*  --  1.6* 1.6*  --         Coagulation:   Recent Labs   Lab 12/12/23  0607 12/12/23  1719 12/12/23  2309 12/13/23 0316 12/13/23  1130 12/14/23  0258 12/14/23  0801   INR 1.2  --   --   --  1.4*  --  1.6*   APTT  --    < > 45.1* 41.2*  --  50.7*  --     < > = values in this interval not displayed.       LDH:  Recent Labs   Lab 12/12/23  0411 12/13/23 0316 12/14/23  0258   * 609* 565*       Microbiology:  Microbiology Results (last 7 days)       Procedure Component Value Units Date/Time    Blood culture [[id number]] Collected: 12/10/23 1240    Order Status: Completed Specimen: Blood from Peripheral, Antecubital, Right Updated: 12/13/23 2012     Blood Culture, Routine No Growth to date      No Growth to date      No Growth to date      No Growth to date    Blood culture [[id number]] Collected: 12/10/23 1149    Order Status: Completed Specimen: Blood from Line, Jugular, External Right Updated: 12/13/23 2012     Blood Culture, Routine No Growth to date      No Growth to date      No Growth to date      No Growth to date    Culture, Respiratory with Gram Stain [[id number]] Collected: 12/11/23 1643    Order Status: Completed Specimen: Respiratory from  Sputum, Expectorated Updated: 12/13/23 0954     Respiratory Culture Normal respiratory bobby      No S aureus or Pseudomonas isolated.     Gram Stain (Respiratory) <10 epithelial cells per low power field.     Gram Stain (Respiratory) Rare WBC's     Gram Stain (Respiratory) Rare Gram positive cocci    Fungus culture [[id number]] Collected: 12/06/23 1532    Order Status: Completed Specimen: Body Fluid from Lung, Right Updated: 12/12/23 0915     Fungus (Mycology) Culture Culture in progress    Narrative:      Bronchial Wash    Clostridium difficile EIA [[id number]] Collected: 12/12/23 0110    Order Status: Completed Specimen: Stool Updated: 12/12/23 0418     C. diff Antigen Negative     C difficile Toxins A+B, EIA Negative     Comment: Testing not recommended for children <24 months old.       Culture, Respiratory [[id number]] Collected: 12/06/23 1531    Order Status: Completed Specimen: Respiratory from Bronchial Wash Updated: 12/08/23 1103     Respiratory Culture No growth     Gram Stain (Respiratory) Moderate WBC's     Gram Stain (Respiratory) Rare Gram negative rods    Narrative:      Bronchial Wash    AFB Culture & Smear [[id number]] Collected: 12/06/23 1532    Order Status: Completed Specimen: Body Fluid from Lung, Right Updated: 12/07/23 2127     AFB Culture & Smear Culture in progress     AFB CULTURE STAIN No acid fast bacilli seen.    Narrative:      Bronchial Wash            BMP:   Recent Labs   Lab 12/14/23  0258 12/14/23  0801   GLU  --  224*   NA  --  137   K  --  4.0   CL  --  103   CO2  --  22*   BUN  --  30*   CREATININE  --  2.1*   CALCIUM  --  8.3*   MG 2.6  --        I have reviewed all pertinent labs within the past 24 hours.    Estimated Creatinine Clearance: 40.5 mL/min (A) (based on SCr of 2.1 mg/dL (H)).    Diagnostic Results:  I have reviewed all pertinent imaging results/findings within the past 24 hours.  Assessment and Plan:     63 year old male with hx of CAD s/p 3v CABG (unclear anatomy,  2009), ICM with a recent EF of 15-20% s/p ICD (elsa 2009), DM2 (a1c 7.7), HTN, HLD, Vfib on amio who presents to the ED with CC of SOB     Pt was recently admitted to [facility] as a transfer.  He was started on a Lasix gtt and did well.  Started on gDMT and discharged home on  5 with plans to follow up in [facility] clinic for ongoing transplant evaluation at another facility.  He says that about a few days ago he started to notice LE swelling.  This turned into Nelson and orthopnea.  He says he can't walk to the bathroom without getting SOB.  He also complains of weight gain.  He takes torsemide 20mg BID at home and was told to trial additional Lasix which he did without any improvement.  He was rx metolazone but has not been filled.  He came to the ED     In the ED he was AF with stable VS on RA.  CBC showing chronic anemia.  CMP notable for acute on chronic CKD with baseline around 2.1 and Cr now 2.6.  BNP elevated.  Lactate neg.  CXR showing pulmonary edema.  I evaluated the pt at the bedside.  Bedside TTE showing CVP >15.  He was subsequently admitted to the CCU for diuresis.     He was continued on his home  and was started on a lasix gtt, which he responded well to overnight (net -1700cc. He feels much better this morning as well. Our transplant coordinators have been working on insurance approval and he is now being transferred to [facility] service for transplant evaluation.     * LVAD (left ventricular assist device) present  -HeartMate 3 Implanted  12/1/2023  as DT  -[facility]  -Implanted by Dr. Washington  -HOLD Coumadin,  Goal INR 2.0-3.0 . Subtherapeutic today. On Heparin gtt   -Antiplatelets Not on ASA  -LDH is stable overall today. Will continue to monitor daily.  -Speed set at  4900   rpm, LSL 4500 rpm   -Interrogation notable for no events  -Not listed for OHTx, declined for OHTX due to comorbidities         Procedure: Device Interrogation Including analysis of device parameters  Current Settings: Ventricular  Assist Device  Review of device function is stable/unstable stable        12/14/2023     1:00 PM 12/14/2023    11:00 AM 12/14/2023    10:00 AM 12/14/2023     9:00 AM 12/14/2023     6:00 AM 12/14/2023     5:00 AM 12/14/2023     4:00 AM   TXP LVAD INTERROGATIONS   Type    HeartMate3 HeartMate3 HeartMate3 HeartMate3   Flow 4.1 4.2 4.1 4.2 4.1 4.1 4.1   Speed 4900 4900 4900 4900 4900 4900 4900   PI 4 4.1 4 3.7 3.9 4.1 4.4   Power ([place]) 3.2 3.3 3.3 3.2 3.3 3.2 3.2   LSL 4500 4500 4500 4500 4500 4500 4500   Pulsatility Intermittent pulse Intermittent pulse  Intermittent pulse Intermittent pulse Intermittent pulse Intermittent pulse         Dysphonia  -Speech following closely, recommends ENT consult    Leukocytosis  -WBC peaked at 33k  -Concern for septic shock. Also on hydrocortisone with increasing pressor requirements, now discontinued   -ID consulted, started on empiric micafungin, meropenem, and vanc  -Cultures remain NGTD  -Completed course for possible pneumonia, will discontinue all antibiotics, ID signed off     Depressed mood  -Psychiatry consulted for depressed mood, SI when left alone  -Recommend sitter when alone (possibly with transition to stepdown). No medications at this time     IVÁN (acute kidney injury)  -Nephrology following  -Uremic    Altered mental status  -multifactorial  -likely metabolic encephalopathy +/- icu delirium   -CT 12/7 negative for acute process  -Continue RRT for metabolic clearance  -Wean sedation as able  -EEG done over the weekend no seizures detected, just generalized slowing  -provide frequent re-orientation  -Promote sleep/wake cycle   -Neurology consulted   - Much more awake, continue to encourage and motivate    Acute on chronic combined systolic and diastolic CHF, NYHA class 4  Pt with known ICM with an EF of 25% on home  presented with decompensated HF.      -lRHC 11/14 RA 14, PA 70/35, PCWP 32, CO 4.1, CI 2.1  -Repeat RHC 11/20 RA 20, PA 60/29 (39), PCWP 29, CO 4.6,  CI 2.3  -ECHO 11/21 showed EF 15-20%, mild RV dysfunction, mild MR, LVED 6.9   -Home  5   -Home GDMT: Entreso 24/26 BID (on hold 2/2 IVÁN), hydralazin 25 q 8hr, all held prior to LVAD implant  -Home diuretic: Lasix 40 mg BID   -IABP inserted 11/21 and underwent DT HM3 implant 12/1, chest closed 12/4  -LVAD speed increased to 4900 rpm on 12/7   -ECHO 12/11 AV does not open, IVS midline, LVEDD 6.1, with HM3 speed set to 4900 rpm  -Increased pressor requirements with Giapreza, Ketty, epi, and vaso 12/11. Able to wean off giapreza overnight 12/11  -CVP 7, SVO2 65%, CO 9.1, CI 4.6, . Weaned off epic, vaso and Ketty. Remains on midodrine 15 TID      PAF (paroxysmal atrial fibrillation)  Known hx of pAF. In sinus rhythm on admission, but 1 run of AF RVR overnight. He spontaneously converted.  - Continue home amiodarone 400mg qd  - A/c per primary team      Acute renal failure with acute tubular necrosis superimposed on stage 3b chronic kidney disease  IVÁN on CKD2. Baseline Cr of 1.7-2.0.   - Hold ARNi, entresto  -Trend BMPs daily   -Nephrology following   -SLED/SCUF for volume removal on hold since 12/13/23  -Will give diuretic challenge with 240 mg IV Lasix, 1000 mg Diuril, and 500 mg IV Diamox   -Midodrine 15 mg TID     Type 2 diabetes mellitus without complication, without long-term current use of insulin  -A1c 7.6, not insulin dependent  - MDSSI  -Currently on Insulin gtt   - Endocrine following, appreciate assistance with blood glucose management    CAD (coronary artery disease)  -S/p 3vCABG in 2009  - home ASA, HI statin- on hold after surgery     Ventricular tachycardia  Hx VT s/p ICD placement (medtronic 2009)  - Continue home amio 400mg qd      Uninterrupted Critical Care/Counseling Time (not including procedures): 65 minutes      Vero Lozada PA-C  Heart Transplant  [facility] - Surgical Intensive Care

## 2023-12-14 NOTE — ASSESSMENT & PLAN NOTE
Pt with known ICM with an EF of 25% on home  presented with decompensated HF.      -lRHC 11/14 RA 14, PA 70/35, PCWP 32, CO 4.1, CI 2.1  -Repeat RHC 11/20 RA 20, PA 60/29 (39), PCWP 29, CO 4.6, CI 2.3  -ECHO 11/21 showed EF 15-20%, mild RV dysfunction, mild MR, LVED 6.9   -Home  5   -Home GDMT: Entreso 24/26 BID (on hold 2/2 IVÁN), hydralazin 25 q 8hr, all held prior to LVAD implant  -Home diuretic: Lasix 40 mg BID   -IABP inserted 11/21 and underwent DT HM3 implant 12/1, chest closed 12/4  -LVAD speed increased to 4900 rpm on 12/7   -ECHO 12/11 AV does not open, IVS midline, LVEDD 6.1, with HM3 speed set to 4900 rpm  -Increased pressor requirements with Giapreza, Ketty, epi, and vaso 12/11. Able to wean off giapreza overnight 12/11  -CVP 7, SVO2 65%, CO 9.1, CI 4.6, . Weaned off epic, vaso and Ketty. Remains on midodrine 15 TID

## 2023-12-14 NOTE — PROGRESS NOTES
[facility] - Surgical Intensive Care  Infectious Disease  Progress Note    Patient Name: Radha Abbott  MRN: [id number]  Admission Date: 11/9/2023  Length of Stay: 35 days  Attending Physician: Sandhya Price MD  Primary Care Provider: Vasu Kong MD    Isolation Status: No active isolations  Assessment/Plan:      ID  Septic shock  64 yo male with LVAD for DT on 12/1, post op complicated by diffuse coagulopathy and RV dysfunction with subsequent chest closure on 12/4. Hospital course complicated by respiratory failure s/p intubation, with bronch on 12/6 notable for RLL mucus plug - cultures no growth to date. Also noted to have increasing leukocytosis suspect initiation of stress dose steroids contributing to rise. CT without acute abscess - though noted to have cortes opacities. ECHO without vegetation and cx data unrevealing so far, resp cx and blcx no growth to date. Doing well, now off pressors.  Leukocytosis and O2 requirements improving.       Recommendations:  -completed a course for potential pneumonia, favor stopping all antibiotics and monitoring off                       Thank you for your consult. Will sign off, please call with questions, new culture data or clinical changes. Above d/w primary team.       50 minutes of total time spent on the encounter, which includes face to face time and non-face to face time preparing to see the patient (eg, review of tests), obtaining and/or reviewing separately obtained history, documenting clinical information in the electronic or other health record, independently interpreting results (not separately reported) and communicating results to the patient/family/caregiver, or care coordination (not separately reported).       Layne Robles MD  Infectious Disease  [facility] - Surgical Intensive Care    Subjective:     Principal Problem:LVAD (left ventricular assist device) present    HPI:  Mr. Abbtot is a 63-year-old man who underwent destination therapy left  ventricular assist device placement.  Postoperatively the chest was left open due to diffuse coagulopathy in RV dysfunction.  He did well post-operatively and underwent washout and closure on 12/4. The patient was extubated but had to be reintubated. A BAL was performed: Gram stain with GNB, culture is NGTD. ID is consulted for BAL results. The patient is agitated but quiets down with reassurance. No fever or chills. Ecgo done this am - results pending. Family at bedside.     Interval History: No fevers documented overnight. Pt reports feeling better. Off epi this morning and O2 being weaned down.     Review of Systems   Constitutional:  Negative for chills and fever.   Respiratory:  Negative for cough and shortness of breath.    Gastrointestinal:  Negative for abdominal pain.     Objective:     Vital Signs (Most Recent):  Temp: 97.9 °F (36.6 °C) (12/14/23 0300)  Pulse: 90 (12/14/23 0630)  Resp: 15 (12/14/23 0600)  BP: (!) 70/0 (12/14/23 0300)  SpO2: 100 % (12/14/23 0745) Vital Signs (24h Range):  Temp:  [97.6 °F (36.4 °C)-98.8 °F (37.1 °C)] 97.9 °F (36.6 °C)  Pulse:  [] 90  Resp:  [12-20] 15  SpO2:  [89 %-100 %] 100 %  BP: (70-78)/(0) 70/0  Arterial Line BP: (72-90)/(51-68) 79/62     Weight: 78.9 kg (174 lb)  Body mass index is 23.6 kg/m².    Estimated Creatinine Clearance: 94.6 mL/min (based on SCr of 0.9 mg/dL).     Physical Exam  Constitutional:       General: He is not in acute distress.  HENT:      Right Ear: External ear normal.      Left Ear: External ear normal.      Nose:      Comments: NC  NGT  Eyes:      General:         Right eye: No discharge.         Left eye: No discharge.   Pulmonary:      Effort: Pulmonary effort is normal. No respiratory distress.   Abdominal:      Comments: Abdominal dressing   Musculoskeletal:      Right lower leg: No edema.      Left lower leg: No edema.   Skin:     General: Skin is warm and dry.      Comments: R picc  RI       Neurological:      Mental Status: He is  alert.          Significant Labs:   Microbiology Results (last 7 days)       Procedure Component Value Units Date/Time    Blood culture [[id number]] Collected: 12/10/23 1240    Order Status: Completed Specimen: Blood from Peripheral, Antecubital, Right Updated: 12/13/23 2012     Blood Culture, Routine No Growth to date      No Growth to date      No Growth to date      No Growth to date    Blood culture [[id number]] Collected: 12/10/23 1149    Order Status: Completed Specimen: Blood from Line, Jugular, External Right Updated: 12/13/23 2012     Blood Culture, Routine No Growth to date      No Growth to date      No Growth to date      No Growth to date    Culture, Respiratory with Gram Stain [[id number]] Collected: 12/11/23 1643    Order Status: Completed Specimen: Respiratory from Sputum, Expectorated Updated: 12/13/23 0954     Respiratory Culture Normal respiratory bobby      No S aureus or Pseudomonas isolated.     Gram Stain (Respiratory) <10 epithelial cells per low power field.     Gram Stain (Respiratory) Rare WBC's     Gram Stain (Respiratory) Rare Gram positive cocci    Fungus culture [[id number]] Collected: 12/06/23 1532    Order Status: Completed Specimen: Body Fluid from Lung, Right Updated: 12/12/23 0915     Fungus (Mycology) Culture Culture in progress    Narrative:      Bronchial Wash    Clostridium difficile EIA [[id number]] Collected: 12/12/23 0110    Order Status: Completed Specimen: Stool Updated: 12/12/23 0418     C. diff Antigen Negative     C difficile Toxins A+B, EIA Negative     Comment: Testing not recommended for children <24 months old.       Culture, Respiratory [[id number]] Collected: 12/06/23 1531    Order Status: Completed Specimen: Respiratory from Bronchial Wash Updated: 12/08/23 1103     Respiratory Culture No growth     Gram Stain (Respiratory) Moderate WBC's     Gram Stain (Respiratory) Rare Gram negative rods    Narrative:      Bronchial Wash    AFB Culture & Smear [[id number]]  Collected: 12/06/23 1532    Order Status: Completed Specimen: Body Fluid from Lung, Right Updated: 12/07/23 2127     AFB Culture & Smear Culture in progress     AFB CULTURE STAIN No acid fast bacilli seen.    Narrative:      Bronchial Wash            Significant Imaging: I have reviewed all pertinent imaging results/findings within the past 24 hours.

## 2023-12-14 NOTE — ASSESSMENT & PLAN NOTE
-multifactorial  -likely metabolic encephalopathy +/- icu delirium   -CT 12/7 negative for acute process  -Continue RRT for metabolic clearance  -Wean sedation as able  -EEG done over the weekend no seizures detected, just generalized slowing  -provide frequent re-orientation  -Promote sleep/wake cycle   -Neurology consulted   - Much more awake, continue to encourage and motivate

## 2023-12-15 DIAGNOSIS — E78.5 HYPERLIPIDEMIA, UNSPECIFIED HYPERLIPIDEMIA TYPE: ICD-10-CM

## 2023-12-15 DIAGNOSIS — Z79.899 AT RISK FOR AMIODARONE TOXICITY WITH LONG TERM USE: ICD-10-CM

## 2023-12-15 DIAGNOSIS — Z91.89 AT RISK FOR AMIODARONE TOXICITY WITH LONG TERM USE: ICD-10-CM

## 2023-12-15 DIAGNOSIS — Z95.811 PRESENCE OF VENTRICULAR ASSIST DEVICE: Primary | ICD-10-CM

## 2023-12-15 LAB
ALBUMIN SERPL BCP-MCNC: 1.8 G/DL (ref 3.5–5.2)
ALBUMIN SERPL BCP-MCNC: 1.9 G/DL (ref 3.5–5.2)
ALBUMIN SERPL BCP-MCNC: 1.9 G/DL (ref 3.5–5.2)
ALLENS TEST: ABNORMAL
ALLENS TEST: ABNORMAL
ALP SERPL-CCNC: 165 U/L (ref 55–135)
ALT SERPL W/O P-5'-P-CCNC: 24 U/L (ref 10–44)
ANION GAP SERPL CALC-SCNC: 11 MMOL/L (ref 8–16)
ANION GAP SERPL CALC-SCNC: 12 MMOL/L (ref 8–16)
ANION GAP SERPL CALC-SCNC: 8 MMOL/L (ref 8–16)
ANION GAP SERPL CALC-SCNC: 9 MMOL/L (ref 8–16)
APTT PPP: 47.6 SEC (ref 21–32)
APTT PPP: 52.2 SEC (ref 21–32)
APTT PPP: 60.5 SEC (ref 21–32)
APTT PPP: 71.2 SEC (ref 21–32)
AST SERPL-CCNC: 51 U/L (ref 10–40)
BACTERIA BLD CULT: NORMAL
BACTERIA BLD CULT: NORMAL
BASOPHILS # BLD AUTO: 0.04 K/UL (ref 0–0.2)
BASOPHILS NFR BLD: 0.2 % (ref 0–1.9)
BILIRUB DIRECT SERPL-MCNC: 0.7 MG/DL (ref 0.1–0.3)
BILIRUB SERPL-MCNC: 1.3 MG/DL (ref 0.1–1)
BLD PROD TYP BPU: NORMAL
BLOOD UNIT EXPIRATION DATE: NORMAL
BLOOD UNIT TYPE CODE: 6200
BLOOD UNIT TYPE: NORMAL
BNP SERPL-MCNC: 1312 PG/ML (ref 0–99)
BUN SERPL-MCNC: 14 MG/DL (ref 8–23)
BUN SERPL-MCNC: 15 MG/DL (ref 8–23)
BUN SERPL-MCNC: 44 MG/DL (ref 8–23)
BUN SERPL-MCNC: 48 MG/DL (ref 8–23)
CA-I BLDV-SCNC: 1.11 MMOL/L (ref 1.06–1.42)
CA-I BLDV-SCNC: 1.12 MMOL/L (ref 1.06–1.42)
CALCIUM SERPL-MCNC: 8.3 MG/DL (ref 8.7–10.5)
CALCIUM SERPL-MCNC: 8.3 MG/DL (ref 8.7–10.5)
CALCIUM SERPL-MCNC: 8.4 MG/DL (ref 8.7–10.5)
CALCIUM SERPL-MCNC: 8.6 MG/DL (ref 8.7–10.5)
CHLORIDE SERPL-SCNC: 103 MMOL/L (ref 95–110)
CHLORIDE SERPL-SCNC: 104 MMOL/L (ref 95–110)
CHLORIDE SERPL-SCNC: 105 MMOL/L (ref 95–110)
CHLORIDE SERPL-SCNC: 105 MMOL/L (ref 95–110)
CO2 SERPL-SCNC: 22 MMOL/L (ref 23–29)
CO2 SERPL-SCNC: 23 MMOL/L (ref 23–29)
CODING SYSTEM: NORMAL
CREAT SERPL-MCNC: 1 MG/DL (ref 0.5–1.4)
CREAT SERPL-MCNC: 1.1 MG/DL (ref 0.5–1.4)
CREAT SERPL-MCNC: 3 MG/DL (ref 0.5–1.4)
CREAT SERPL-MCNC: 3.1 MG/DL (ref 0.5–1.4)
CROSSMATCH INTERPRETATION: NORMAL
CRP SERPL-MCNC: 175.7 MG/L (ref 0–8.2)
DELSYS: ABNORMAL
DIFFERENTIAL METHOD BLD: ABNORMAL
DISPENSE STATUS: NORMAL
EOSINOPHIL # BLD AUTO: 0.2 K/UL (ref 0–0.5)
EOSINOPHIL NFR BLD: 0.8 % (ref 0–8)
ERYTHROCYTE [DISTWIDTH] IN BLOOD BY AUTOMATED COUNT: 22.4 % (ref 11.5–14.5)
EST. GFR  (NO RACE VARIABLE): 22 ML/MIN/1.73 M^2
EST. GFR  (NO RACE VARIABLE): 22.9 ML/MIN/1.73 M^2
EST. GFR  (NO RACE VARIABLE): >60 ML/MIN/1.73 M^2
EST. GFR  (NO RACE VARIABLE): >60 ML/MIN/1.73 M^2
GLUCOSE SERPL-MCNC: 104 MG/DL (ref 70–110)
GLUCOSE SERPL-MCNC: 105 MG/DL (ref 70–110)
GLUCOSE SERPL-MCNC: 151 MG/DL (ref 70–110)
GLUCOSE SERPL-MCNC: 95 MG/DL (ref 70–110)
HCO3 UR-SCNC: 23.5 MMOL/L (ref 24–28)
HCT VFR BLD AUTO: 20.6 % (ref 40–54)
HGB BLD-MCNC: 6.5 G/DL (ref 14–18)
HLA DQA1 1: NORMAL
HLA DQA1 2: NORMAL
HLA DRB4 1: NORMAL
HLA-A 1 SERO. EQUIV: 1
HLA-A 1: NORMAL
HLA-A 2 SERO. EQUIV: 30
HLA-A 2: NORMAL
HLA-B 1 SERO. EQUIV: 8
HLA-B 1: NORMAL
HLA-B 2 SERO. EQUIV: 53
HLA-B 2: NORMAL
HLA-BW 1 SERO. EQUIV: 4
HLA-BW 2 SERO. EQUIV: 6
HLA-C 1: NORMAL
HLA-C 2: NORMAL
HLA-CW 1 SERO. EQUIV: 4
HLA-CW 2 SERO. EQUIV: 7
HLA-DPA1 1: NORMAL
HLA-DPA1 2: NORMAL
HLA-DPB1 1: NORMAL
HLA-DPB1 2: NORMAL
HLA-DQ 1 SERO. EQUIV: 7
HLA-DQ 2 SERO. EQUIV: 6
HLA-DQB1 1: NORMAL
HLA-DQB1 2: NORMAL
HLA-DRB1 1 SERO. EQUIV: 8
HLA-DRB1 1: NORMAL
HLA-DRB1 2 SERO. EQUIV: 15
HLA-DRB1 2: NORMAL
HLA-DRB3 1: NORMAL
HLA-DRB3 2: NORMAL
HLA-DRB345 1 SERO. EQUIV: 51
HLA-DRB345 2 SERO. EQUIV: NORMAL
HLA-DRB4 2: NORMAL
HLA-DRB5 1: NORMAL
HLA-DRB5 2: NORMAL
IMM GRANULOCYTES # BLD AUTO: 0.64 K/UL (ref 0–0.04)
IMM GRANULOCYTES NFR BLD AUTO: 3.5 % (ref 0–0.5)
INR PPP: 1.8 (ref 0.8–1.2)
LDH SERPL L TO P-CCNC: 577 U/L (ref 110–260)
LYMPHOCYTES # BLD AUTO: 0.7 K/UL (ref 1–4.8)
LYMPHOCYTES NFR BLD: 3.6 % (ref 18–48)
MAGNESIUM SERPL-MCNC: 1.9 MG/DL (ref 1.6–2.6)
MAGNESIUM SERPL-MCNC: 2.6 MG/DL (ref 1.6–2.6)
MAGNESIUM SERPL-MCNC: 2.6 MG/DL (ref 1.6–2.6)
MCH RBC QN AUTO: 29.4 PG (ref 27–31)
MCHC RBC AUTO-ENTMCNC: 31.6 G/DL (ref 32–36)
MCV RBC AUTO: 93 FL (ref 82–98)
MONOCYTES # BLD AUTO: 0.9 K/UL (ref 0.3–1)
MONOCYTES NFR BLD: 5 % (ref 4–15)
NEUTROPHILS # BLD AUTO: 16 K/UL (ref 1.8–7.7)
NEUTROPHILS NFR BLD: 86.9 % (ref 38–73)
NRBC BLD-RTO: 2 /100 WBC
NUM UNITS TRANS PACKED RBC: NORMAL
PCO2 BLDA: 40.4 MMHG (ref 35–45)
PH SMN: 7.37 [PH] (ref 7.35–7.45)
PHOSPHATE SERPL-MCNC: 2.6 MG/DL (ref 2.7–4.5)
PHOSPHATE SERPL-MCNC: 2.9 MG/DL (ref 2.7–4.5)
PHOSPHATE SERPL-MCNC: <1 MG/DL (ref 2.7–4.5)
PLATELET # BLD AUTO: 458 K/UL (ref 150–450)
PMV BLD AUTO: 11.6 FL (ref 9.2–12.9)
PO2 BLDA: 24 MMHG (ref 40–60)
PO2 BLDA: 25 MMHG (ref 40–60)
POC BE: -2 MMOL/L
POC SATURATED O2: 40 % (ref 95–100)
POC SATURATED O2: 45 % (ref 95–100)
POC SVO2: 40 %
POC TCO2: 25 MMOL/L (ref 24–29)
POCT GLUCOSE: 126 MG/DL (ref 70–110)
POCT GLUCOSE: 127 MG/DL (ref 70–110)
POCT GLUCOSE: 141 MG/DL (ref 70–110)
POCT GLUCOSE: 143 MG/DL (ref 70–110)
POCT GLUCOSE: 164 MG/DL (ref 70–110)
POCT GLUCOSE: 228 MG/DL (ref 70–110)
POCT GLUCOSE: 231 MG/DL (ref 70–110)
POCT GLUCOSE: 239 MG/DL (ref 70–110)
POTASSIUM SERPL-SCNC: 3.5 MMOL/L (ref 3.5–5.1)
POTASSIUM SERPL-SCNC: 3.5 MMOL/L (ref 3.5–5.1)
POTASSIUM SERPL-SCNC: 4 MMOL/L (ref 3.5–5.1)
POTASSIUM SERPL-SCNC: 4 MMOL/L (ref 3.5–5.1)
PROT SERPL-MCNC: 5.9 G/DL (ref 6–8.4)
PROTHROMBIN TIME: 18.8 SEC (ref 9–12.5)
RBC # BLD AUTO: 2.21 M/UL (ref 4.6–6.2)
RTPCR TESTING DATE: NORMAL
SAMPLE: ABNORMAL
SAMPLE: ABNORMAL
SITE: ABNORMAL
SITE: ABNORMAL
SODIUM SERPL-SCNC: 136 MMOL/L (ref 136–145)
SODIUM SERPL-SCNC: 136 MMOL/L (ref 136–145)
SODIUM SERPL-SCNC: 137 MMOL/L (ref 136–145)
SODIUM SERPL-SCNC: 137 MMOL/L (ref 136–145)
WBC # BLD AUTO: 18.42 K/UL (ref 3.9–12.7)

## 2023-12-15 PROCEDURE — P9016 RBC LEUKOCYTES REDUCED: HCPCS | Performed by: NURSE PRACTITIONER

## 2023-12-15 PROCEDURE — 86920 COMPATIBILITY TEST SPIN: CPT | Performed by: NURSE PRACTITIONER

## 2023-12-15 PROCEDURE — 80069 RENAL FUNCTION PANEL: CPT | Mod: 91 | Performed by: STUDENT IN AN ORGANIZED HEALTH CARE EDUCATION/TRAINING PROGRAM

## 2023-12-15 PROCEDURE — 25000003 PHARM REV CODE 250: Performed by: INTERNAL MEDICINE

## 2023-12-15 PROCEDURE — 82330 ASSAY OF CALCIUM: CPT | Mod: 91 | Performed by: STUDENT IN AN ORGANIZED HEALTH CARE EDUCATION/TRAINING PROGRAM

## 2023-12-15 PROCEDURE — 99292 CRITICAL CARE ADDL 30 MIN: CPT | Mod: FS,,, | Performed by: NURSE PRACTITIONER

## 2023-12-15 PROCEDURE — 25000242 PHARM REV CODE 250 ALT 637 W/ HCPCS: Performed by: NURSE PRACTITIONER

## 2023-12-15 PROCEDURE — 20000000 HC ICU ROOM

## 2023-12-15 PROCEDURE — 83615 LACTATE (LD) (LDH) ENZYME: CPT | Performed by: STUDENT IN AN ORGANIZED HEALTH CARE EDUCATION/TRAINING PROGRAM

## 2023-12-15 PROCEDURE — C9113 INJ PANTOPRAZOLE SODIUM, VIA: HCPCS | Performed by: PHYSICIAN ASSISTANT

## 2023-12-15 PROCEDURE — 85730 THROMBOPLASTIN TIME PARTIAL: CPT | Mod: 91 | Performed by: INTERNAL MEDICINE

## 2023-12-15 PROCEDURE — 92526 ORAL FUNCTION THERAPY: CPT

## 2023-12-15 PROCEDURE — 99232 SBSQ HOSP IP/OBS MODERATE 35: CPT | Mod: ,,, | Performed by: NURSE PRACTITIONER

## 2023-12-15 PROCEDURE — 97535 SELF CARE MNGMENT TRAINING: CPT

## 2023-12-15 PROCEDURE — 84134 ASSAY OF PREALBUMIN: CPT | Performed by: STUDENT IN AN ORGANIZED HEALTH CARE EDUCATION/TRAINING PROGRAM

## 2023-12-15 PROCEDURE — 27000248 HC VAD-ADDITIONAL DAY

## 2023-12-15 PROCEDURE — 94799 UNLISTED PULMONARY SVC/PX: CPT

## 2023-12-15 PROCEDURE — 83735 ASSAY OF MAGNESIUM: CPT | Performed by: STUDENT IN AN ORGANIZED HEALTH CARE EDUCATION/TRAINING PROGRAM

## 2023-12-15 PROCEDURE — 94668 MNPJ CHEST WALL SBSQ: CPT

## 2023-12-15 PROCEDURE — 25000003 PHARM REV CODE 250: Performed by: PHYSICIAN ASSISTANT

## 2023-12-15 PROCEDURE — 25000003 PHARM REV CODE 250

## 2023-12-15 PROCEDURE — 85730 THROMBOPLASTIN TIME PARTIAL: CPT | Performed by: PHYSICIAN ASSISTANT

## 2023-12-15 PROCEDURE — 85025 COMPLETE CBC W/AUTO DIFF WBC: CPT | Performed by: THORACIC SURGERY (CARDIOTHORACIC VASCULAR SURGERY)

## 2023-12-15 PROCEDURE — 90945 DIALYSIS ONE EVALUATION: CPT

## 2023-12-15 PROCEDURE — 86140 C-REACTIVE PROTEIN: CPT | Performed by: STUDENT IN AN ORGANIZED HEALTH CARE EDUCATION/TRAINING PROGRAM

## 2023-12-15 PROCEDURE — 97110 THERAPEUTIC EXERCISES: CPT

## 2023-12-15 PROCEDURE — 85610 PROTHROMBIN TIME: CPT | Performed by: PHYSICIAN ASSISTANT

## 2023-12-15 PROCEDURE — 36430 TRANSFUSION BLD/BLD COMPNT: CPT

## 2023-12-15 PROCEDURE — 99900026 HC AIRWAY MAINTENANCE (STAT)

## 2023-12-15 PROCEDURE — 80048 BASIC METABOLIC PNL TOTAL CA: CPT | Mod: 91 | Performed by: PHYSICIAN ASSISTANT

## 2023-12-15 PROCEDURE — 99900035 HC TECH TIME PER 15 MIN (STAT)

## 2023-12-15 PROCEDURE — 82803 BLOOD GASES ANY COMBINATION: CPT

## 2023-12-15 PROCEDURE — 94664 DEMO&/EVAL PT USE INHALER: CPT

## 2023-12-15 PROCEDURE — 63600175 PHARM REV CODE 636 W HCPCS: Performed by: PHYSICIAN ASSISTANT

## 2023-12-15 PROCEDURE — 83735 ASSAY OF MAGNESIUM: CPT | Mod: 91 | Performed by: STUDENT IN AN ORGANIZED HEALTH CARE EDUCATION/TRAINING PROGRAM

## 2023-12-15 PROCEDURE — 63600175 PHARM REV CODE 636 W HCPCS: Performed by: STUDENT IN AN ORGANIZED HEALTH CARE EDUCATION/TRAINING PROGRAM

## 2023-12-15 PROCEDURE — 85730 THROMBOPLASTIN TIME PARTIAL: CPT | Mod: 91 | Performed by: STUDENT IN AN ORGANIZED HEALTH CARE EDUCATION/TRAINING PROGRAM

## 2023-12-15 PROCEDURE — 80076 HEPATIC FUNCTION PANEL: CPT | Performed by: PHYSICIAN ASSISTANT

## 2023-12-15 PROCEDURE — 94761 N-INVAS EAR/PLS OXIMETRY MLT: CPT | Mod: XB

## 2023-12-15 PROCEDURE — 99291 CRITICAL CARE FIRST HOUR: CPT | Mod: FS,,, | Performed by: NURSE PRACTITIONER

## 2023-12-15 PROCEDURE — 97530 THERAPEUTIC ACTIVITIES: CPT

## 2023-12-15 PROCEDURE — 25000003 PHARM REV CODE 250: Performed by: STUDENT IN AN ORGANIZED HEALTH CARE EDUCATION/TRAINING PROGRAM

## 2023-12-15 PROCEDURE — 93750 INTERROGATION VAD IN PERSON: CPT | Mod: ,,, | Performed by: INTERNAL MEDICINE

## 2023-12-15 PROCEDURE — 25000003 PHARM REV CODE 250: Performed by: THORACIC SURGERY (CARDIOTHORACIC VASCULAR SURGERY)

## 2023-12-15 PROCEDURE — 27000646 HC AEROBIKA DEVICE

## 2023-12-15 PROCEDURE — 84100 ASSAY OF PHOSPHORUS: CPT | Performed by: THORACIC SURGERY (CARDIOTHORACIC VASCULAR SURGERY)

## 2023-12-15 PROCEDURE — 83880 ASSAY OF NATRIURETIC PEPTIDE: CPT | Performed by: STUDENT IN AN ORGANIZED HEALTH CARE EDUCATION/TRAINING PROGRAM

## 2023-12-15 RX ORDER — MIRTAZAPINE 15 MG/1
15 TABLET, FILM COATED ORAL NIGHTLY
Status: DISCONTINUED | OUTPATIENT
Start: 2023-12-15 | End: 2023-12-29

## 2023-12-15 RX ORDER — HYDROCODONE BITARTRATE AND ACETAMINOPHEN 500; 5 MG/1; MG/1
TABLET ORAL
Status: DISCONTINUED | OUTPATIENT
Start: 2023-12-15 | End: 2023-12-17

## 2023-12-15 RX ORDER — INSULIN ASPART 100 [IU]/ML
9 INJECTION, SOLUTION INTRAVENOUS; SUBCUTANEOUS
Status: DISCONTINUED | OUTPATIENT
Start: 2023-12-15 | End: 2023-12-17

## 2023-12-15 RX ORDER — MAGNESIUM SULFATE HEPTAHYDRATE 40 MG/ML
2 INJECTION, SOLUTION INTRAVENOUS
Status: DISPENSED | OUTPATIENT
Start: 2023-12-15 | End: 2023-12-16

## 2023-12-15 RX ADMIN — MIDODRINE HYDROCHLORIDE 15 MG: 5 TABLET ORAL at 08:12

## 2023-12-15 RX ADMIN — QUETIAPINE FUMARATE 50 MG: 25 TABLET ORAL at 08:12

## 2023-12-15 RX ADMIN — INSULIN ASPART 4 UNITS: 100 INJECTION, SOLUTION INTRAVENOUS; SUBCUTANEOUS at 08:12

## 2023-12-15 RX ADMIN — MIDODRINE HYDROCHLORIDE 15 MG: 5 TABLET ORAL at 02:12

## 2023-12-15 RX ADMIN — INSULIN ASPART 9 UNITS: 100 INJECTION, SOLUTION INTRAVENOUS; SUBCUTANEOUS at 11:12

## 2023-12-15 RX ADMIN — ATORVASTATIN CALCIUM 40 MG: 10 TABLET, FILM COATED ORAL at 09:12

## 2023-12-15 RX ADMIN — INSULIN ASPART 9 UNITS: 100 INJECTION, SOLUTION INTRAVENOUS; SUBCUTANEOUS at 04:12

## 2023-12-15 RX ADMIN — INSULIN ASPART 6 UNITS: 100 INJECTION, SOLUTION INTRAVENOUS; SUBCUTANEOUS at 08:12

## 2023-12-15 RX ADMIN — INSULIN ASPART 6 UNITS: 100 INJECTION, SOLUTION INTRAVENOUS; SUBCUTANEOUS at 04:12

## 2023-12-15 RX ADMIN — SENNOSIDES AND DOCUSATE SODIUM 1 TABLET: 8.6; 5 TABLET ORAL at 09:12

## 2023-12-15 RX ADMIN — AMIODARONE HYDROCHLORIDE 400 MG: 200 TABLET ORAL at 09:12

## 2023-12-15 RX ADMIN — MIDODRINE HYDROCHLORIDE 15 MG: 5 TABLET ORAL at 09:12

## 2023-12-15 RX ADMIN — PANTOPRAZOLE SODIUM 40 MG: 40 INJECTION, POWDER, FOR SOLUTION INTRAVENOUS at 09:12

## 2023-12-15 RX ADMIN — INSULIN ASPART 9 UNITS: 100 INJECTION, SOLUTION INTRAVENOUS; SUBCUTANEOUS at 07:12

## 2023-12-15 RX ADMIN — POLYETHYLENE GLYCOL 3350 17 G: 17 POWDER, FOR SOLUTION ORAL at 09:12

## 2023-12-15 RX ADMIN — CALCIUM GLUCONATE: 98 INJECTION, SOLUTION INTRAVENOUS at 01:12

## 2023-12-15 RX ADMIN — INSULIN ASPART 9 UNITS: 100 INJECTION, SOLUTION INTRAVENOUS; SUBCUTANEOUS at 12:12

## 2023-12-15 RX ADMIN — INSULIN ASPART 6 UNITS: 100 INJECTION, SOLUTION INTRAVENOUS; SUBCUTANEOUS at 12:12

## 2023-12-15 RX ADMIN — MIRTAZAPINE 15 MG: 15 TABLET, FILM COATED ORAL at 10:12

## 2023-12-15 RX ADMIN — PSYLLIUM HUSK 1 PACKET: 3.4 POWDER ORAL at 09:12

## 2023-12-15 RX ADMIN — WARFARIN SODIUM 4 MG: 4 TABLET ORAL at 05:12

## 2023-12-15 RX ADMIN — DEXTROSE MONOHYDRATE, SODIUM CITRATE, AND CITRIC ACID MONOHYDRATE: 2.45; 2.2; .8 INJECTION, SOLUTION INTRAVENOUS at 01:12

## 2023-12-15 RX ADMIN — INSULIN ASPART 4 UNITS: 100 INJECTION, SOLUTION INTRAVENOUS; SUBCUTANEOUS at 12:12

## 2023-12-15 RX ADMIN — INSULIN ASPART 2 UNITS: 100 INJECTION, SOLUTION INTRAVENOUS; SUBCUTANEOUS at 04:12

## 2023-12-15 RX ADMIN — ACETAMINOPHEN 650 MG: 325 TABLET ORAL at 01:12

## 2023-12-15 NOTE — SUBJECTIVE & OBJECTIVE
Interval History: Sitting up in chair with wife at bedside. Alert and oriented, can whisper only. Off of all pressors and inotropes, but is on Midodrine for BP support. SLED held yesterday. Did not respond to diuretic challenge yesterday, remains anuric. Hgb 6.5. If SLED resumed today will give a unit of PC's. CVP 8, sVO2 45 with CO/CI 6.14/3.08. Cultures all remain -ve - ID D/C'd antibiotics yesterday. Unable to progress with Speech Therapy - messaged ENT to see if they can augment his left vocal cord today    Continuous Infusions:   sodium chloride 0.9% 5 mL/hr at 12/15/23 1000    dextrose 10 % in water (D10W)      EPINEPHrine Stopped (12/13/23 2344)    heparin (porcine) in D5W 9 Units/kg/hr (12/15/23 1000)     Scheduled Meds:   amiodarone  400 mg Per NG tube Daily    atorvastatin  40 mg Per NG tube Daily    insulin aspart U-100  9 Units Subcutaneous 6 times per day    midodrine  15 mg Oral TID    mirtazapine  15 mg Per NG tube Nightly    pantoprazole  40 mg Intravenous Daily    polyethylene glycol  17 g Per NG tube Daily    psyllium husk (aspartame)  1 packet Oral Daily    senna-docusate 8.6-50 mg  1 tablet Per NG tube Daily    warfarin  4 mg Per NG tube Daily     PRN Meds:acetaminophen, albuterol sulfate, bisacodyL, dextrose 10 % in water (D10W), dextrose 10%, dextrose 10%, glucagon (human recombinant), insulin aspart U-100, magnesium hydroxide 400 mg/5 ml, melatonin, potassium chloride, potassium chloride, QUEtiapine, Flushing PICC/Midline Protocol **AND** [DISCONTINUED] sodium chloride 0.9% **AND** sodium chloride 0.9%    Review of patient's allergies indicates:  No Known Allergies  Objective:     Vital Signs (Most Recent):  Temp: 97.7 °F (36.5 °C) (12/15/23 0700)  Pulse: 90 (12/15/23 1030)  Resp: (!) 22 (12/15/23 1030)  BP: (!) 76/0 (12/15/23 0700)  SpO2: 96 % (12/15/23 0700) Vital Signs (24h Range):  Temp:  [97.7 °F (36.5 °C)-98.6 °F (37 °C)] 97.7 °F (36.5 °C)  Pulse:  [] 90  Resp:  [15-26] 22  SpO2:   [96 %-100 %] 96 %  BP: (70-76)/(0) 76/0  Arterial Line BP: (66-87)/(50-81) 70/52     Patient Vitals for the past 72 hrs (Last 3 readings):   Weight   12/15/23 0600 78 kg (171 lb 15.3 oz)   12/14/23 0445 78.9 kg (174 lb)   12/13/23 0228 78.5 kg (173 lb)     Body mass index is 23.32 kg/m².      Intake/Output Summary (Last 24 hours) at 12/15/2023 1059  Last data filed at 12/15/2023 1000  Gross per 24 hour   Intake 1336.68 ml   Output --   Net 1336.68 ml       Hemodynamic Parameters:       Telemetry: SR       Physical Exam  Constitutional:       Comments: Cachectic   HENT:      Head: Normocephalic and atraumatic.   Eyes:      Conjunctiva/sclera: Conjunctivae normal.      Pupils: Pupils are equal, round, and reactive to light.   Cardiovascular:      Rate and Rhythm: Normal rate and regular rhythm.      Comments: Do not appreciate elevated neck veins  Pulmonary:      Effort: Pulmonary effort is normal.      Breath sounds: Normal breath sounds.   Abdominal:      General: Bowel sounds are normal.      Palpations: Abdomen is soft.   Musculoskeletal:         General: No swelling. Normal range of motion.   Skin:     General: Skin is dry.      Capillary Refill: Capillary refill takes 2 to 3 seconds.      Comments: Extremities cool to touch   Neurological:      Mental Status: He is alert and oriented to person, place, and time.            Significant Labs:  CBC:  Recent Labs   Lab 12/13/23  0316 12/13/23  0825 12/14/23  0258 12/14/23  1538 12/15/23  0305   WBC 26.53*  --  19.65*  --  18.42*   RBC 2.32*  --  2.20*  --  2.21*   HGB 6.8*  --  6.4*  --  6.5*   HCT 21.0*   < > 19.7* 17* 20.6*     --  416  --  458*   MCV 91  --  90  --  93   MCH 29.3  --  29.1  --  29.4   MCHC 32.4  --  32.5  --  31.6*    < > = values in this interval not displayed.     BNP:  Recent Labs   Lab 12/11/23  0405 12/13/23  0316 12/15/23  0311   BNP 2,154* 1,460* 1,312*     CMP:  Recent Labs   Lab 12/13/23  1700 12/14/23  0258 12/14/23  0801  12/14/23  1538 12/14/23  2147 12/15/23  0305   *  --    < > 119* 118* 151*   CALCIUM 8.3*  --    < > 7.5* 8.0* 8.3*   ALBUMIN 2.0* 2.0*  --   --   --  1.9*   PROT 6.3 6.3  --   --   --  5.9*   *  --    < > 138 137 137   K 4.1  --    < > 3.4* 3.5 3.5   CO2 20*  --    < > 19* 22* 22*     --    < > 108 104 103   BUN 10  --    < > 31* 40* 44*   CREATININE 0.9  --    < > 2.1* 2.5* 3.0*   ALKPHOS 172* 198*  --   --   --  165*   ALT 22 20  --   --   --  24   AST 41* 40  --   --   --  51*   BILITOT 1.6* 1.6*  --   --   --  1.3*    < > = values in this interval not displayed.      Coagulation:   Recent Labs   Lab 12/13/23  0316 12/13/23  1130 12/14/23  0258 12/14/23  0801 12/15/23  0305   INR  --  1.4*  --  1.6* 1.8*   APTT 41.2*  --  50.7*  --  71.2*     LDH:  Recent Labs   Lab 12/13/23  0316 12/14/23  0258 12/15/23  0305   * 565* 577*     Microbiology:  Microbiology Results (last 7 days)       Procedure Component Value Units Date/Time    Blood culture [[id number]] Collected: 12/10/23 1240    Order Status: Completed Specimen: Blood from Peripheral, Antecubital, Right Updated: 12/14/23 2012     Blood Culture, Routine No Growth to date      No Growth to date      No Growth to date      No Growth to date      No Growth to date    Blood culture [[id number]] Collected: 12/10/23 1149    Order Status: Completed Specimen: Blood from Line, Jugular, External Right Updated: 12/14/23 2012     Blood Culture, Routine No Growth to date      No Growth to date      No Growth to date      No Growth to date      No Growth to date    Culture, Respiratory with Gram Stain [[id number]] Collected: 12/11/23 5516    Order Status: Completed Specimen: Respiratory from Sputum, Expectorated Updated: 12/13/23 0981     Respiratory Culture Normal respiratory bobby      No S aureus or Pseudomonas isolated.     Gram Stain (Respiratory) <10 epithelial cells per low power field.     Gram Stain (Respiratory) Rare WBC's     Gram Stain  (Respiratory) Rare Gram positive cocci    Fungus culture [[id number]] Collected: 12/06/23 1532    Order Status: Completed Specimen: Body Fluid from Lung, Right Updated: 12/12/23 0915     Fungus (Mycology) Culture Culture in progress    Narrative:      Bronchial Wash    Clostridium difficile EIA [[id number]] Collected: 12/12/23 0110    Order Status: Completed Specimen: Stool Updated: 12/12/23 0418     C. diff Antigen Negative     C difficile Toxins A+B, EIA Negative     Comment: Testing not recommended for children <24 months old.       Culture, Respiratory [[id number]] Collected: 12/06/23 1531    Order Status: Completed Specimen: Respiratory from Bronchial Wash Updated: 12/08/23 1103     Respiratory Culture No growth     Gram Stain (Respiratory) Moderate WBC's     Gram Stain (Respiratory) Rare Gram negative rods    Narrative:      Bronchial Wash            I have reviewed all pertinent labs within the past 24 hours.    Estimated Creatinine Clearance: 28.4 mL/min (A) (based on SCr of 3 mg/dL (H)).    Diagnostic Results:  I have reviewed and interpreted all pertinent imaging results/findings within the past 24 hours.

## 2023-12-15 NOTE — PROGRESS NOTES
[facility]  Nephrology  Progress Note    Patient Name: Radha Abbott  MRN: [id number]  Admission Date: 11/9/2023  Hospital Length of Stay: 36 days  Attending Provider: Sandhya Price MD   Primary Care Physician: Vasu Kong MD  Principal Problem:LVAD (left ventricular assist device) present    Subjective:     HPI: Mr. Abbott is a 63-year-old man with ischemic cardiomyopathy with most recent TTE showing EF 10 -15% and G3DD s/p [employer] ICM placement on chronic dobutamine infusion, CAD s/p x3 vessel CABG back in 2009, type 2 diabetes mellitus (most recent hemoglobin A1c 7.6%), hypertension, HLD, history of ventricular fibrillation for which he is on amiodarone and CKD IIIb (baseline creatinine ~2-2.2) who was initially admitted on 11/9 with heart failure exacerbation and hypervolemia for which he was managed with inotrope with dobutamine in addition to IV diuresis. He ultimately underwent IABP placed on 11/21 for mechanical hemodynamic support and subsequently underwent LVAD placement on 12/1. His renal function appears to be mostly stable with IVÁN and creatinine in mid 2s to 3s in addition he is still making urine with Lasix infusion however on 12/5 Nephrology consulted given concern for uremia with BUN 72.     Interval History: Patient seen and examined this AM. Wife at bedside. Afebrile with pulse ranging from 90-80s bpm. Systolic blood pressures ranging from 80-70s mmHg via arterial line (no on vasopressor support). He is saturating 96% on room air with no documented UOP in the last 24 hours despite having received Lasix 240 mg IV, Diuril 1 gram IV and Diamox 500 mg IV yesterday. Net positive ~1.4 liters. CVP this morning 7 mmHg. Plan to resume RRT today and primary team to transfuse one unit of pRBCs with RRT.    Review of patient's allergies indicates:  No Known Allergies  Current Facility-Administered Medications   Medication Frequency    0.9%  NaCl infusion Continuous     acetaminophen tablet 650 mg Q6H PRN    albuterol sulfate nebulizer solution 2.5 mg Q4H PRN    amiodarone tablet 400 mg Daily    atorvastatin tablet 40 mg Daily    bisacodyL suppository 10 mg Daily PRN    dextrose 10 % infusion Continuous PRN    dextrose 10% bolus 125 mL 125 mL PRN    dextrose 10% bolus 250 mL 250 mL PRN    EPINEPHrine 5 mg in dextrose 5% 250 mL infusion (premix) Continuous    glucagon (human recombinant) injection 1 mg PRN    heparin 25,000 units in dextrose 5% 250 ml (100 units/mL) infusion MINIMAL INTENSITY nomogram - [facility] Continuous    insulin aspart U-100 pen 0-10 Units Q4H PRN    insulin aspart U-100 pen 6 Units 6 times per day    magnesium hydroxide 400 mg/5 ml suspension 2,400 mg Daily PRN    melatonin tablet 6 mg Nightly PRN    midodrine tablet 15 mg TID    pantoprazole injection 40 mg Daily    polyethylene glycol packet 17 g Daily    potassium chloride 20 mEq in 100 mL IVPB (FOR CENTRAL LINE ADMINISTRATION ONLY) PRN    potassium chloride 20 mEq in 100 mL IVPB (FOR CENTRAL LINE ADMINISTRATION ONLY) PRN    QUEtiapine tablet 50 mg Q6H PRN    senna-docusate 8.6-50 mg per tablet 1 tablet Daily    sodium chloride 0.9% flush 10 mL PRN    warfarin (COUMADIN) tablet 4 mg Daily       Objective:     Vital Signs (Most Recent):  Temp: 97.8 °F (36.6 °C) (12/15/23 0300)  Pulse: 94 (12/15/23 0645)  Resp: 18 (12/15/23 0600)  BP: (!) 72/0 (12/15/23 0300)  SpO2: 96 % (12/15/23 0000) Vital Signs (24h Range):  Temp:  [97.8 °F (36.6 °C)-98.6 °F (37 °C)] 97.8 °F (36.6 °C)  Pulse:  [] 94  Resp:  [15-23] 18  SpO2:  [96 %-100 %] 96 %  BP: (70-74)/(0) 72/0  Arterial Line BP: ()/(53-98) 77/59     Weight: 78 kg (171 lb 15.3 oz) (12/15/23 0600)  Body mass index is 23.32 kg/m².  Body surface area is 1.99 meters squared.    I/O last 3 completed shifts:  In: 1745.4 [I.V.:346.9; NG/GT:950; IV Piggyback:448.5]  Out: -     Physical Exam  Vitals and nursing note reviewed.   Constitutional:       General: He is  awake. He is not in acute distress.     Appearance: He is ill-appearing. He is not diaphoretic.   HENT:      Head: Normocephalic and atraumatic.      Right Ear: External ear normal.      Left Ear: External ear normal.      Nose: Nose normal.      Comments: NG tube to left nostril.     Mouth/Throat:      Mouth: Mucous membranes are moist.      Pharynx: Oropharynx is clear. No oropharyngeal exudate or posterior oropharyngeal erythema.   Eyes:      General: No scleral icterus.        Right eye: No discharge.         Left eye: No discharge.      Extraocular Movements: Extraocular movements intact.      Conjunctiva/sclera: Conjunctivae normal.   Neck:      Comments: Trialysis catheter to left internal jugular vein.   Cardiovascular:      Rate and Rhythm: Normal rate.      Heart sounds: Murmur (humming sound, VAD) heard.      Systolic murmur is present.      No friction rub. No gallop.   Pulmonary:      Breath sounds: Rhonchi present. No wheezing or rales.   Chest:      Comments: LVAD present.   Abdominal:      General: Bowel sounds are normal. There is no distension.      Palpations: Abdomen is soft.   Musculoskeletal:      Cervical back: Neck supple.      Right lower leg: No edema.      Left lower leg: No edema.   Skin:     General: Skin is warm and dry.      Coloration: Skin is not jaundiced.   Neurological:      Mental Status: He is alert.   Psychiatric:         Behavior: Behavior is cooperative.          Significant Labs:  ABGs:   Recent Labs   Lab 12/14/23 1955   PH 7.396   PCO2 36.6   HCO3 22.5*   POCSATURATED 47   BE -2       BMP:   Recent Labs   Lab 12/15/23  0305   *      K 3.5      CO2 22*   BUN 44*   CREATININE 3.0*   CALCIUM 8.3*   MG 2.6       CBC:   Recent Labs   Lab 12/15/23  0305   WBC 18.42*   RBC 2.21*   HGB 6.5*   HCT 20.6*   *   MCV 93   MCH 29.4   MCHC 31.6*       CMP:   Recent Labs   Lab 12/15/23  0305   *   CALCIUM 8.3*   ALBUMIN 1.9*   PROT 5.9*      K 3.5    CO2 22*      BUN 44*   CREATININE 3.0*   ALKPHOS 165*   ALT 24   AST 51*   BILITOT 1.3*       Coagulation:   Recent Labs   Lab 12/15/23  0305   INR 1.8*   APTT 71.2*       LFTs:   Recent Labs   Lab 12/15/23  0305   ALT 24   AST 51*   ALKPHOS 165*   BILITOT 1.3*   PROT 5.9*   ALBUMIN 1.9*       Microbiology Results (last 7 days)       Procedure Component Value Units Date/Time    Blood culture [[id number]] Collected: 12/10/23 1240    Order Status: Completed Specimen: Blood from Peripheral, Antecubital, Right Updated: 12/14/23 2012     Blood Culture, Routine No Growth to date      No Growth to date      No Growth to date      No Growth to date      No Growth to date    Blood culture [[id number]] Collected: 12/10/23 1149    Order Status: Completed Specimen: Blood from Line, Jugular, External Right Updated: 12/14/23 2012     Blood Culture, Routine No Growth to date      No Growth to date      No Growth to date      No Growth to date      No Growth to date    Culture, Respiratory with Gram Stain [[id number]] Collected: 12/11/23 1643    Order Status: Completed Specimen: Respiratory from Sputum, Expectorated Updated: 12/13/23 0954     Respiratory Culture Normal respiratory bobby      No S aureus or Pseudomonas isolated.     Gram Stain (Respiratory) <10 epithelial cells per low power field.     Gram Stain (Respiratory) Rare WBC's     Gram Stain (Respiratory) Rare Gram positive cocci    Fungus culture [[id number]] Collected: 12/06/23 1532    Order Status: Completed Specimen: Body Fluid from Lung, Right Updated: 12/12/23 0915     Fungus (Mycology) Culture Culture in progress    Narrative:      Bronchial Wash    Clostridium difficile EIA [[id number]] Collected: 12/12/23 0110    Order Status: Completed Specimen: Stool Updated: 12/12/23 0418     C. diff Antigen Negative     C difficile Toxins A+B, EIA Negative     Comment: Testing not recommended for children <24 months old.       Culture, Respiratory [[id number]]  Collected: 12/06/23 1531    Order Status: Completed Specimen: Respiratory from Bronchial Wash Updated: 12/08/23 1103     Respiratory Culture No growth     Gram Stain (Respiratory) Moderate WBC's     Gram Stain (Respiratory) Rare Gram negative rods    Narrative:      Bronchial Wash          Specimen (24h ago, onward)      None          Significant Imaging:  I have reviewed all imagining in the last 24 hours.  Assessment/Plan:     Cardiac/Vascular  * LVAD (left ventricular assist device) present  Pre-transplant evaluation for heart transplant  Acute on chronic combined systolic and diastolic CHF, NYHA class 4  Patient is identified as having Combined Systolic and Diastolic heart failure that is Acute on chronic. CHF is currently uncontrolled due to JVD, Rales/crackles on pulmonary exam, and Pulmonary edema/pleural effusion on CXR. Latest ECHO performed and demonstrates- Results for orders placed during the hospital encounter of 11/09/23    Echo    Interpretation Summary    Left Ventricle: The left ventricle is moderately dilated. Mildly increased ventricular mass. Normal wall thickness. There is eccentric hypertrophy. Severe global hyperkinesis present. There is severely reduced systolic function with a visually estimated ejection fraction of 10 -15%. Grade III diastolic dysfunction.    Right Ventricle: Normal right ventricular cavity size. Systolic function is normal. Pacemaker lead present in the ventricle.    Left Atrium: Left atrium is severely dilated.    Mitral Valve: There is annular and bileaflet tethering. There is moderate regurgitation with a centrally directed jet.    Tricuspid Valve: There is mild regurgitation with a centrally directed jet.    Pulmonary Artery: The estimated pulmonary artery systolic pressure is 41 mmHg.    IVC/SVC: Normal venous pressure at 3 mmHg.    last BNP reviewed- and noted below   Recent Labs   Lab 12/15/23  0311   BNP 1,312*       - management per primary team  - LVAD for  mechanical support    Renal/  Acute renal failure with acute tubular necrosis superimposed on stage 3b chronic kidney disease  - suspect acute tubular injury secondary to hypotension/cardiogenic shock likely compounded by intra-arterial contrast and anemia with urinary sediment with granular casts  - failed diuretic trial, anuric for which will resume with SLED for metabolic clearance and volume management as patient receiving one unit of pRBCs today  - Q8H RFP, magnesium and ionized calcium per SLED protocol with citrate  - renal diet/tube feeds when not NPO, volume restriction per primary team  - strict I/O's and daily weights  - keep MAP > 65 mmHg  - renally all dose medications to eGFR  - avoid nephrotoxic agents wean feasible (i.e. NSAIDs, intra-arterial contrast, supra-therapeutic vancomycin levels, etc.)    Thank you for your consult. I will follow-up with patient. Please contact us if you have any additional questions.    Henok Ayon MD  Nephrology  [facility]    ATTENDING PHYSICIAN ATTESTATION  I have personally verified the history and examined the patient. I thoroughly reviewed the demographic, clinical, laboratorial and imaging information available in medical records. I agree with the assessment and recommendations provided by the subspecialty resident who was under my supervision.

## 2023-12-15 NOTE — CARE UPDATE
HTS Hemodynamics:   Parameter   at 2000   CVP 7   SvO2 47   CO  6.2   CI 3.1      MAP 70     Current Heart Failure Medications:  - Midodrine    Current Mechanical Circulatory Support:  LVAD HM3 4900 RPM, on heparin gtt    I/Os: No UOP day shift.    Intake/Output Summary (Last 24 hours) at 12/14/2023 2050  Last data filed at 12/14/2023 2000  Gross per 24 hour   Intake 1116.32 ml   Output --   Net 1116.32 ml     Assessment/Plan:  - Plan was discussed with on-call attending  - Diuretic challenge today with no UOP  - BUN, Cr, K and filling pressures stable. Monitor for now on restarting CRRT, if restart CRRT will give pRBC 1 unit    Reginald Garcia MD  Cardiovascular Disease PGY-V  [facility]

## 2023-12-15 NOTE — SUBJECTIVE & OBJECTIVE
Interval History: Sitting on EOB earlier this morning. Remains anuric. On continuous SLED with CVP 9. Labs are stable, transaminases trending down. sVO2 55 with CO/CI 7.54/3.60 and . ENT augmented paralyzed left vocal cord and rec that he rest his vocal cords for 24 hours. Tolerating tube feeds at goal but will ask Dietician to increase caloric intake to 2500 kim every 24 hours.     Continuous Infusions:   sodium chloride 0.9% 5 mL/hr at 12/15/23 1100    calcium gluconate 30 g in dextrose 5 % (D5W) 1,000 mL infusion      dextrose 10 % in water (D10W)      dextrose-sod citrate-citric ac      EPINEPHrine Stopped (12/13/23 6524)    heparin (porcine) in D5W 9 Units/kg/hr (12/15/23 1100)     Scheduled Meds:   amiodarone  400 mg Per NG tube Daily    atorvastatin  40 mg Per NG tube Daily    insulin aspart U-100  9 Units Subcutaneous 6 times per day    midodrine  15 mg Oral TID    mirtazapine  15 mg Per NG tube Nightly    pantoprazole  40 mg Intravenous Daily    polyethylene glycol  17 g Per NG tube Daily    psyllium husk (aspartame)  1 packet Oral Daily    senna-docusate 8.6-50 mg  1 tablet Per NG tube Daily    warfarin  4 mg Per NG tube Daily     PRN Meds:acetaminophen, albuterol sulfate, bisacodyL, dextrose 10 % in water (D10W), dextrose 10%, dextrose 10%, glucagon (human recombinant), insulin aspart U-100, magnesium hydroxide 400 mg/5 ml, magnesium sulfate IVPB, melatonin, potassium chloride, potassium chloride, QUEtiapine, Flushing PICC/Midline Protocol **AND** [DISCONTINUED] sodium chloride 0.9% **AND** sodium chloride 0.9%, sodium phosphate 20.01 mmol in dextrose 5 % (D5W) 250 mL IVPB, sodium phosphate 30 mmol in dextrose 5 % (D5W) 250 mL IVPB, sodium phosphate 39.99 mmol in dextrose 5 % (D5W) 250 mL IVPB    Review of patient's allergies indicates:  No Known Allergies  Objective:     Vital Signs (Most Recent):  Temp: 97.8 °F (36.6 °C) (12/15/23 1128)  Pulse: 91 (12/15/23 1130)  Resp: 20 (12/15/23 1130)  BP:  (!) 70/0 (12/15/23 1128)  SpO2: 97 % (12/15/23 1128) Vital Signs (24h Range):  Temp:  [97.7 °F (36.5 °C)-98.5 °F (36.9 °C)] 97.8 °F (36.6 °C)  Pulse:  [] 91  Resp:  [15-26] 20  SpO2:  [96 %-100 %] 97 %  BP: (70-76)/(0) 70/0  Arterial Line BP: (66-87)/(50-81) 75/59     Patient Vitals for the past 72 hrs (Last 3 readings):   Weight   12/15/23 0600 78 kg (171 lb 15.3 oz)   12/14/23 0445 78.9 kg (174 lb)   12/13/23 0228 78.5 kg (173 lb)     Body mass index is 23.32 kg/m².      Intake/Output Summary (Last 24 hours) at 12/15/2023 1202  Last data filed at 12/15/2023 1100  Gross per 24 hour   Intake 1219.08 ml   Output --   Net 1219.08 ml       Hemodynamic Parameters:       Telemetry: SR       Physical Exam  Constitutional:       Comments: Cachectic, temporal wasting   HENT:      Head: Normocephalic and atraumatic.   Eyes:      Conjunctiva/sclera: Conjunctivae normal.      Pupils: Pupils are equal, round, and reactive to light.   Neck:      Comments: RIJ trialysis line. Do not appreciate elevated JVP  Cardiovascular:      Rate and Rhythm: Normal rate and regular rhythm.      Comments: Smooth VAD hum  Pulmonary:      Effort: Pulmonary effort is normal.      Breath sounds: Normal breath sounds.   Abdominal:      General: Bowel sounds are normal.      Palpations: Abdomen is soft.   Musculoskeletal:         General: No swelling. Normal range of motion.   Skin:     General: Skin is warm and dry.      Capillary Refill: Capillary refill takes 2 to 3 seconds.   Neurological:      General: No focal deficit present.      Mental Status: He is alert and oriented to person, place, and time.            Significant Labs:  CBC:  Recent Labs   Lab 12/13/23  0316 12/13/23  0825 12/14/23  0258 12/14/23  1538 12/15/23  0305   WBC 26.53*  --  19.65*  --  18.42*   RBC 2.32*  --  2.20*  --  2.21*   HGB 6.8*  --  6.4*  --  6.5*   HCT 21.0*   < > 19.7* 17* 20.6*     --  416  --  458*   MCV 91  --  90  --  93   MCH 29.3  --  29.1  --   29.4   MCHC 32.4  --  32.5  --  31.6*    < > = values in this interval not displayed.     BNP:  Recent Labs   Lab 12/11/23  0405 12/13/23  0316 12/15/23  0311   BNP 2,154* 1,460* 1,312*     CMP:  Recent Labs   Lab 12/13/23  1700 12/14/23  0258 12/14/23  0801 12/14/23  1538 12/14/23  2147 12/15/23  0305   *  --    < > 119* 118* 151*   CALCIUM 8.3*  --    < > 7.5* 8.0* 8.3*   ALBUMIN 2.0* 2.0*  --   --   --  1.9*   PROT 6.3 6.3  --   --   --  5.9*   *  --    < > 138 137 137   K 4.1  --    < > 3.4* 3.5 3.5   CO2 20*  --    < > 19* 22* 22*     --    < > 108 104 103   BUN 10  --    < > 31* 40* 44*   CREATININE 0.9  --    < > 2.1* 2.5* 3.0*   ALKPHOS 172* 198*  --   --   --  165*   ALT 22 20  --   --   --  24   AST 41* 40  --   --   --  51*   BILITOT 1.6* 1.6*  --   --   --  1.3*    < > = values in this interval not displayed.      Coagulation:   Recent Labs   Lab 12/13/23  1130 12/14/23  0258 12/14/23  0801 12/15/23  0305 12/15/23  1059   INR 1.4*  --  1.6* 1.8*  --    APTT  --  50.7*  --  71.2* 60.5*     LDH:  Recent Labs   Lab 12/13/23  0316 12/14/23  0258 12/15/23  0305   * 565* 577*     Microbiology:  Microbiology Results (last 7 days)       Procedure Component Value Units Date/Time    Blood culture [[id number]] Collected: 12/10/23 1240    Order Status: Completed Specimen: Blood from Peripheral, Antecubital, Right Updated: 12/14/23 2012     Blood Culture, Routine No Growth to date      No Growth to date      No Growth to date      No Growth to date      No Growth to date    Blood culture [[id number]] Collected: 12/10/23 1149    Order Status: Completed Specimen: Blood from Line, Jugular, External Right Updated: 12/14/23 2012     Blood Culture, Routine No Growth to date      No Growth to date      No Growth to date      No Growth to date      No Growth to date    Culture, Respiratory with Gram Stain [[id number]] Collected: 12/11/23 1643    Order Status: Completed Specimen: Respiratory from  Sputum, Expectorated Updated: 12/13/23 0954     Respiratory Culture Normal respiratory bobby      No S aureus or Pseudomonas isolated.     Gram Stain (Respiratory) <10 epithelial cells per low power field.     Gram Stain (Respiratory) Rare WBC's     Gram Stain (Respiratory) Rare Gram positive cocci    Fungus culture [[id number]] Collected: 12/06/23 1532    Order Status: Completed Specimen: Body Fluid from Lung, Right Updated: 12/12/23 0915     Fungus (Mycology) Culture Culture in progress    Narrative:      Bronchial Wash    Clostridium difficile EIA [[id number]] Collected: 12/12/23 0110    Order Status: Completed Specimen: Stool Updated: 12/12/23 0418     C. diff Antigen Negative     C difficile Toxins A+B, EIA Negative     Comment: Testing not recommended for children <24 months old.               I have reviewed all pertinent labs within the past 24 hours.    Estimated Creatinine Clearance: 28.4 mL/min (A) (based on SCr of 3 mg/dL (H)).    Diagnostic Results:  I have reviewed and interpreted all pertinent imaging results/findings within the past 24 hours.

## 2023-12-15 NOTE — ASSESSMENT & PLAN NOTE
-Speech following closely  -Appreciate ENT's help. Messaged ENT to see if they could augment paralyzed left vocal cord as he is currently unable to progress with SLP

## 2023-12-15 NOTE — SUBJECTIVE & OBJECTIVE
Interval History: Patient seen and examined this AM. Wife at bedside. Afebrile with pulse ranging from 90-80s bpm. Systolic blood pressures ranging from 80-70s mmHg via arterial line (no on vasopressor support). He is saturating 96% on room air with no documented UOP in the last 24 hours despite having received Lasix 240 mg IV, Diuril 1 gram IV and Diamox 500 mg IV yesterday. Net positive ~1.4 liters. CVP this morning 7 mmHg. Plan to resume RRT today and primary team to transfuse one unit of pRBCs with RRT.    Review of patient's allergies indicates:  No Known Allergies  Current Facility-Administered Medications   Medication Frequency    0.9%  NaCl infusion Continuous    acetaminophen tablet 650 mg Q6H PRN    albuterol sulfate nebulizer solution 2.5 mg Q4H PRN    amiodarone tablet 400 mg Daily    atorvastatin tablet 40 mg Daily    bisacodyL suppository 10 mg Daily PRN    dextrose 10 % infusion Continuous PRN    dextrose 10% bolus 125 mL 125 mL PRN    dextrose 10% bolus 250 mL 250 mL PRN    EPINEPHrine 5 mg in dextrose 5% 250 mL infusion (premix) Continuous    glucagon (human recombinant) injection 1 mg PRN    heparin 25,000 units in dextrose 5% 250 ml (100 units/mL) infusion MINIMAL INTENSITY nomogram - [facility] Continuous    insulin aspart U-100 pen 0-10 Units Q4H PRN    insulin aspart U-100 pen 6 Units 6 times per day    magnesium hydroxide 400 mg/5 ml suspension 2,400 mg Daily PRN    melatonin tablet 6 mg Nightly PRN    midodrine tablet 15 mg TID    pantoprazole injection 40 mg Daily    polyethylene glycol packet 17 g Daily    potassium chloride 20 mEq in 100 mL IVPB (FOR CENTRAL LINE ADMINISTRATION ONLY) PRN    potassium chloride 20 mEq in 100 mL IVPB (FOR CENTRAL LINE ADMINISTRATION ONLY) PRN    QUEtiapine tablet 50 mg Q6H PRN    senna-docusate 8.6-50 mg per tablet 1 tablet Daily    sodium chloride 0.9% flush 10 mL PRN    warfarin (COUMADIN) tablet 4 mg Daily       Objective:     Vital Signs (Most Recent):  Temp:  97.8 °F (36.6 °C) (12/15/23 0300)  Pulse: 94 (12/15/23 0645)  Resp: 18 (12/15/23 0600)  BP: (!) 72/0 (12/15/23 0300)  SpO2: 96 % (12/15/23 0000) Vital Signs (24h Range):  Temp:  [97.8 °F (36.6 °C)-98.6 °F (37 °C)] 97.8 °F (36.6 °C)  Pulse:  [] 94  Resp:  [15-23] 18  SpO2:  [96 %-100 %] 96 %  BP: (70-74)/(0) 72/0  Arterial Line BP: ()/(53-98) 77/59     Weight: 78 kg (171 lb 15.3 oz) (12/15/23 0600)  Body mass index is 23.32 kg/m².  Body surface area is 1.99 meters squared.    I/O last 3 completed shifts:  In: 1745.4 [I.V.:346.9; NG/GT:950; IV Piggyback:448.5]  Out: -      Physical Exam  Vitals and nursing note reviewed.   Constitutional:       General: He is awake. He is not in acute distress.     Appearance: He is ill-appearing. He is not diaphoretic.   HENT:      Head: Normocephalic and atraumatic.      Right Ear: External ear normal.      Left Ear: External ear normal.      Nose: Nose normal.      Comments: NG tube to left nostril.     Mouth/Throat:      Mouth: Mucous membranes are moist.      Pharynx: Oropharynx is clear. No oropharyngeal exudate or posterior oropharyngeal erythema.   Eyes:      General: No scleral icterus.        Right eye: No discharge.         Left eye: No discharge.      Extraocular Movements: Extraocular movements intact.      Conjunctiva/sclera: Conjunctivae normal.   Neck:      Comments: Trialysis catheter to left internal jugular vein.   Cardiovascular:      Rate and Rhythm: Normal rate.      Heart sounds: Murmur (humming sound, VAD) heard.      Systolic murmur is present.      No friction rub. No gallop.   Pulmonary:      Breath sounds: Rhonchi present. No wheezing or rales.   Chest:      Comments: LVAD present.   Abdominal:      General: Bowel sounds are normal. There is no distension.      Palpations: Abdomen is soft.   Musculoskeletal:      Cervical back: Neck supple.      Right lower leg: No edema.      Left lower leg: No edema.   Skin:     General: Skin is warm and dry.       Coloration: Skin is not jaundiced.   Neurological:      Mental Status: He is alert.   Psychiatric:         Behavior: Behavior is cooperative.          Significant Labs:  ABGs:   Recent Labs   Lab 12/14/23 1955   PH 7.396   PCO2 36.6   HCO3 22.5*   POCSATURATED 47   BE -2       BMP:   Recent Labs   Lab 12/15/23  0305   *      K 3.5      CO2 22*   BUN 44*   CREATININE 3.0*   CALCIUM 8.3*   MG 2.6       CBC:   Recent Labs   Lab 12/15/23  0305   WBC 18.42*   RBC 2.21*   HGB 6.5*   HCT 20.6*   *   MCV 93   MCH 29.4   MCHC 31.6*       CMP:   Recent Labs   Lab 12/15/23  0305   *   CALCIUM 8.3*   ALBUMIN 1.9*   PROT 5.9*      K 3.5   CO2 22*      BUN 44*   CREATININE 3.0*   ALKPHOS 165*   ALT 24   AST 51*   BILITOT 1.3*       Coagulation:   Recent Labs   Lab 12/15/23  0305   INR 1.8*   APTT 71.2*       LFTs:   Recent Labs   Lab 12/15/23  0305   ALT 24   AST 51*   ALKPHOS 165*   BILITOT 1.3*   PROT 5.9*   ALBUMIN 1.9*       Microbiology Results (last 7 days)       Procedure Component Value Units Date/Time    Blood culture [[id number]] Collected: 12/10/23 1240    Order Status: Completed Specimen: Blood from Peripheral, Antecubital, Right Updated: 12/14/23 2012     Blood Culture, Routine No Growth to date      No Growth to date      No Growth to date      No Growth to date      No Growth to date    Blood culture [[id number]] Collected: 12/10/23 1149    Order Status: Completed Specimen: Blood from Line, Jugular, External Right Updated: 12/14/23 2012     Blood Culture, Routine No Growth to date      No Growth to date      No Growth to date      No Growth to date      No Growth to date    Culture, Respiratory with Gram Stain [[id number]] Collected: 12/11/23 1643    Order Status: Completed Specimen: Respiratory from Sputum, Expectorated Updated: 12/13/23 0954     Respiratory Culture Normal respiratory bobby      No S aureus or Pseudomonas isolated.     Gram Stain (Respiratory) <10  epithelial cells per low power field.     Gram Stain (Respiratory) Rare WBC's     Gram Stain (Respiratory) Rare Gram positive cocci    Fungus culture [[id number]] Collected: 12/06/23 1532    Order Status: Completed Specimen: Body Fluid from Lung, Right Updated: 12/12/23 0915     Fungus (Mycology) Culture Culture in progress    Narrative:      Bronchial Wash    Clostridium difficile EIA [[id number]] Collected: 12/12/23 0110    Order Status: Completed Specimen: Stool Updated: 12/12/23 0418     C. diff Antigen Negative     C difficile Toxins A+B, EIA Negative     Comment: Testing not recommended for children <24 months old.       Culture, Respiratory [[id number]] Collected: 12/06/23 1531    Order Status: Completed Specimen: Respiratory from Bronchial Wash Updated: 12/08/23 1103     Respiratory Culture No growth     Gram Stain (Respiratory) Moderate WBC's     Gram Stain (Respiratory) Rare Gram negative rods    Narrative:      Bronchial Wash          Specimen (24h ago, onward)      None          Significant Imaging:  I have reviewed all imagining in the last 24 hours.

## 2023-12-15 NOTE — ASSESSMENT & PLAN NOTE
Pt with known ICM with an EF of 25% on home  presented with decompensated HF.      -lRHC 11/14 RA 14, PA 70/35, PCWP 32, CO 4.1, CI 2.1  -Repeat RHC 11/20 RA 20, PA 60/29 (39), PCWP 29, CO 4.6, CI 2.3  -ECHO 11/21 showed EF 15-20%, mild RV dysfunction, mild MR, LVED 6.9   -Home  5   -Home GDMT: Entreso 24/26 BID (on hold 2/2 IVÁN), hydralazine 25 q 8hr, all held prior to LVAD implant  -Current GDMT: Continue Hyd/Isordil, Farxiga. Will transition Valsartan to Entresto and add Aldactone  -Home diuretic: Lasix 40 mg BID   -IABP inserted 11/21 and underwent DT HM3 implant 12/1, chest closed 12/4  -LVAD speed increased to 4900 rpm on 12/7   -ECHO 12/11 AV does not open, IVS midline, LVEDD 6.1, with HM3 speed set to 4900 rpm  -Increased pressor requirements with Giapreza, Ketty, epi, and vaso 12/11. Able to wean off giapreza overnight 12/11. He has been weaned off all pressors and inotropes  -CVP 7, SVO2 45%, CO 6.14, CI 3.08,   -Remains on midodrine 15 TID

## 2023-12-15 NOTE — PROGRESS NOTES
[facility] - Surgical Intensive Care  Heart Transplant  Progress Note    Patient Name: Radha Abbott  MRN: [id number]  Admission Date: 11/9/2023  Hospital Length of Stay: 36 days  Attending Physician: Sandhya Price MD  Primary Care Provider: Vasu Kong MD  Principal Problem:LVAD (left ventricular assist device) present    Subjective:   Interval Assessment: Sitting up in chair with wife at bedside. Alert and oriented, can whisper only. Off of all pressors and inotropes, but is on Midodrine for BP support. SLED held yesterday. Did not respond to diuretic challenge yesterday, remains anuric. Hgb 6.5. If SLED resumed today will give a unit of PC's. CVP 8, sVO2 45 with CO/CI 6.14/3.08. Cultures all remain -ve - ID D/C'd antibiotics yesterday. Unable to progress with Speech Therapy - messaged ENT to see if they can augment his left vocal cord today      Assessment and Plan:     63 year old male with hx of CAD s/p 3v CABG (unclear anatomy, 2009), ICM with a recent EF of 15-20% s/p ICD (medtronik 2009), DM2 (a1c 7.7), HTN, HLD, Vfib on amio who presents to the ED with CC of SOB     Pt was recently admitted to [facility] [place] as a transfer.  He was started on a Lasix gtt and did well.  Started on gDMT and discharged home on  5 with plans to follow up in HTS clinic for ongoing transplant evaluation at another facility.  He says that about a few days ago he started to notice LE swelling.  This turned into Nelson and orthopnea.  He says he can't walk to the bathroom without getting SOB.  He also complains of weight gain.  He takes torsemide 20mg BID at home and was told to trial additional Lasix which he did without any improvement.  He was rx metolazone but has not been filled.  He came to the ED     In the ED he was AF with stable VS on RA.  CBC showing chronic anemia.  CMP notable for acute on chronic CKD with baseline around 2.1 and Cr now 2.6.  BNP elevated.  Lactate neg.  CXR showing pulmonary edema.  I evaluated the pt at  the bedside.  Bedside TTE showing CVP >15.  He was subsequently admitted to the CCU for diuresis.     He was continued on his home  and was started on a lasix gtt, which he responded well to overnight (net -1700cc. He feels much better this morning as well. Our transplant coordinators have been working on insurance approval and he is now being transferred to [facility] service for transplant evaluation.     * LVAD (left ventricular assist device) present  -HeartMate 3 Implanted  12/1/2023  as DT  -HTS Primary  -Implanted by Dr. Washington  -Continue Coumadin, dosing by PharmD.  Goal INR 2.0-3.0 . Subtherapeutic today. On Heparin gtt   -Antiplatelets Not on ASA  -LDH is stable overall today. Will continue to monitor daily.  -Speed set at  4900   rpm, LSL 4500 rpm   -Interrogation notable for no events  -ECHO 12/11 AV does not open, IVS midline, LVEDD 6.1, with HM3 speed set to 4900 rpm  -Not listed for OHTx, declined for OHTX due to comorbidities         Procedure: Device Interrogation Including analysis of device parameters  Current Settings: Ventricular Assist Device  Review of device function is stable/unstable stable        12/15/2023    10:00 AM 12/15/2023     8:00 AM 12/15/2023     7:00 AM 12/15/2023     6:00 AM 12/15/2023     5:00 AM 12/15/2023     4:00 AM 12/15/2023     3:00 AM   TXP LVAD INTERROGATIONS   Type HeartMate3  HeartMate3 HeartMate3 HeartMate3 HeartMate3 HeartMate3   Flow 4.2 4.2 4.1 4.3 4.3 4.3 4.2   Speed 4900 4900 4900 4900 4900 4900 4900   PI 3.7 3.7 3.4 3.2 3.1 3.2 3.4   Power ([place]) 3.3 3.3 3.3 3.2 3.3 3.3 3.2   LSL 4500 4500 4500 4500 4500 4500 4500   Pulsatility No Pulse No Pulse Intermittent pulse Intermittent pulse Intermittent pulse Intermittent pulse Intermittent pulse         Vocal cord paralysis, unilateral complete  -Appreciate ENT's help  -Messaged ENT to see if paralyzed left vocal cord could be augmented    Dysphonia  -Speech following closely  -Appreciate ENT's help. Messaged ENT to see  if they could augment paralyzed left vocal cord as he is currently unable to progress with SLP    Moderate malnutrition  -RD and SLP following  -Tolerating     Leukocytosis  -WBC peaked at 33k  -Concern for septic shock. Also on hydrocortisone with increasing pressor requirements, now discontinued   -ID consulted, started on empiric micafungin, meropenem, and vanc  -Cultures remain NGTD  -Completed course for possible pneumonia, will discontinue all antibiotics, ID signed off     Adjustment disorder with anxiety  -Starting Remeron 15 mg q HS    Depressed mood  -Psychiatry consulted for depressed mood, SI when left alone  -Recommend sitter when alone (possibly with transition to stepdown).  -Starting Remeron 15 mg q HS    IVÁN (acute kidney injury)  -Nephrology following      Altered mental status  -multifactorial  -likely metabolic encephalopathy +/- icu delirium   -CTH 12/7 negative for acute process  -Continue RRT for metabolic clearance as needed  -EEG done over the weekend no seizures detected, just generalized slowing  -provide frequent re-orientation  -Promote sleep/wake cycle   -Neurology consulted   - Much more awake, continue to encourage and motivate    Acute on chronic combined systolic and diastolic CHF, NYHA class 4  Pt with known ICM with an EF of 25% on home  presented with decompensated HF.      -lRHC 11/14 RA 14, PA 70/35, PCWP 32, CO 4.1, CI 2.1  -Repeat RHC 11/20 RA 20, PA 60/29 (39), PCWP 29, CO 4.6, CI 2.3  -ECHO 11/21 showed EF 15-20%, mild RV dysfunction, mild MR, LVED 6.9   -Home  5   -Home GDMT: Entreso 24/26 BID (on hold 2/2 IVÁN), hydralazine 25 q 8hr, all held prior to LVAD implant  -Current GDMT: Continue Hyd/Isordil, Farxiga. Will transition Valsartan to Entresto and add Aldactone  -Home diuretic: Lasix 40 mg BID   -IABP inserted 11/21 and underwent DT HM3 implant 12/1, chest closed 12/4  -LVAD speed increased to 4900 rpm on 12/7   -ECHO 12/11 AV does not open, IVS midline, LVEDD  6.1, with HM3 speed set to 4900 rpm  -Increased pressor requirements with Giapreza, Ketty, epi, and vaso 12/11. Able to wean off giapreza overnight 12/11. He has been weaned off all pressors and inotropes  -CVP 7, SVO2 45%, CO 6.14, CI 3.08,   -Remains on midodrine 15 TID      PAF (paroxysmal atrial fibrillation)  Known hx of pAF. In sinus rhythm on admission, but 1 run of AF RVR overnight. He spontaneously converted.  - Continue home amiodarone 400mg qd  - A/c per primary team      Acute renal failure with acute tubular necrosis superimposed on stage 3b chronic kidney disease  IVÁN on CKD2. Baseline Cr of 1.7-2.0.   - Hold ARNi, entresto  -Trend BMPs daily   -Nephrology following   -SLED/SCUF for volume removal began on 12/1, on hold since 12/13/23  -Did not respond to diuretic challenge with 240 mg IV Lasix, 1000 mg Diuril, and 500 mg IV Diamox done 12/14  -Midodrine 15 mg TID     Type 2 diabetes mellitus without complication, without long-term current use of insulin  -A1c 7.6, not insulin dependent  - MDSSI  -Currently on Insulin gtt   - Endocrine following, appreciate assistance with blood glucose management    CAD (coronary artery disease)  -S/p 3vCABG in 2009  - home ASA, Lipitor    Ventricular tachycardia  Hx VT s/p ICD placement (medtronic 2009)  - Continue home amio 400mg qd      Uninterrupted Critical Care/Counseling Time (not including procedures): 65 minutes      Fany Abbasi NP [id number]  Heart Transplant  [facility] - Surgical Intensive Care

## 2023-12-15 NOTE — ASSESSMENT & PLAN NOTE
-WBC peaked at 33k, trending down at 18k. Afebrile  -Concern for septic shock. Was also on hydrocortisone with increasing pressor requirements, now discontinued   -ID consulted, started on empiric micafungin, meropenem, and vanc  -Cultures remain NGTD  -Completed course for possible pneumonia, will discontinue all antibiotics, ID signed off

## 2023-12-15 NOTE — NURSING
SICU PLAN OF CARE NOTE    Dx: LVAD (left ventricular assist device) present    Goals of Care:   MAP 70-80  Q4 accuchecks    Vital Signs (last 12 hours):   Temp:  [97.8 °F (36.6 °C)-98.5 °F (36.9 °C)]   Pulse:  [82-98]   Resp:  [15-20]   BP: (70-72)/(0)   SpO2:  [96 %-100 %]   Arterial Line BP: (70-85)/(53-67)      Neuro: AAO x4, Follows Commands, and Moves All Extremities    Cardiac: NSR.     Respiratory: Room Air    Gtts: Heparin @ 9 units/kg/hr.    Urine Output: Anuric     Drains: NG/OG Tube Tube feeds at 40ml/hr cc /  shift    Diet: NPO and Tube Feeds     Labs/Accuchecks: Q4 accuchecks.    VAD:  Speed: 4900  Flow: 4.1-4.2  PI: 3.1-3.8  Power:3.2-3.3    Skin:  All skin remains free from injury. Patient turned Q2 and PRN.    Shift Events:  No UOP throughout shift. Bladder scan Q6, see flowsheets for details. PRN Quetiapine given. VAD dressing change to be done in AM. Possible plans of CRRT in afternoon. See flowsheet for further assessment/details. Family updated on current condition/plan of care, questions answered, and emotional support provided.  MD updated on current condition, vitals, labs, and gtts.  No new orders received, will continue to monitor.

## 2023-12-15 NOTE — ASSESSMENT & PLAN NOTE
-Psychiatry consulted for depressed mood, SI when left alone  -Recommend sitter when alone (possibly with transition to stepdown).  -Starting Remeron

## 2023-12-15 NOTE — NURSING
Rounded on patient, asleep in bed with spouse at bedside. RN and spouse state patient worked hard with therapy and request not to wake patient. Spouse has been completing daily logs. Patient is participating in VAD education with therapy. Will continue to follow.

## 2023-12-15 NOTE — PT/OT/SLP PROGRESS
"Occupational Therapy   Co-Treatment with PT  Co-treatment performed due to patient's multiple deficits requiring two skilled therapists to appropriately and safely assess patient's strength and endurance while facilitating functional tasks in addition to accommodating for patient's activity tolerance.         Name: Radha Abbott  MRN: [id number]  Admitting Diagnosis:  LVAD (left ventricular assist device) present  11 Days Post-Op    Recommendations:     Discharge Recommendations: High Intensity Therapy  Discharge Equipment Recommendations:   (TBD)  Barriers to discharge:  Other (Comment) (decreased abilities for completion of self care and mobility tasks)    Assessment:     Radha Abbott is a 63 y.o. male with a medical diagnosis of LVAD (left ventricular assist device) present.  Pt tolerated OT session well with good participation and motivation. Pt is progressing well overall but does remain limited. Pt would continue to benefit from skilled OT services to maximize functional (I) & facilitate safe discharge. Performance deficits affecting function are weakness, impaired endurance, impaired sensation, impaired self care skills, impaired functional mobility, gait instability, impaired balance, impaired cognition, decreased coordination, decreased upper extremity function, decreased lower extremity function, decreased safety awareness, pain, abnormal tone, decreased ROM, impaired coordination, impaired fine motor, impaired cardiopulmonary response to activity.     Rehab Prognosis:  Good; patient would benefit from acute skilled OT services to address these deficits and reach maximum level of function.       Plan:     Patient to be seen 6 x/week to address the above listed problems via self-care/home management, therapeutic activities, therapeutic exercises, neuromuscular re-education  Plan of Care Expires: 01/02/24  Plan of Care Reviewed with: patient, spouse    Subjective     Chief Complaint: "I'll get up"  Patient/Family " Comments/goals: Get better  Pain/Comfort:  Pain Rating 1: 0/10    Objective:     Communicated with: RN prior to session.  Patient found supine with NG tube, telemetry, oxygen, Trialysis, PICC line, arterial line, LVAD upon OT entry to room.    General Precautions: Standard, LVAD, fall    Orthopedic Precautions:N/A  Braces: N/A  Respiratory Status: Room air     Occupational Performance:     Bed Mobility:    Patient completed Rolling/Turning to Left with  maximal assistance  Patient completed Scooting/Bridging with maximal assistance  Patient completed Supine to Sit with maximal assistance     Functional Mobility/Transfers:  Patient completed Sit <> Stand Transfer with moderate assistance and of 2 persons  with  hand-held assist   Patient completed Bed <> Chair Transfer using Squat Pivot technique with dependence with hand-held assist  Functional Mobility: Patient completed transfer from bed to chair with dependence after 1x trial sit to stand to gauge function.     Activities of Daily Living:  Patient denied ADLs having completed earlier.       [facility] 6 Click ADL: 12    Treatment & Education:  Treatment as stated above. Patient progressing well toward goals set for OT.   -Education on energy conservation and task modification to maximize safety and (I) during ADLs and mobility  -Education on importance of OOB activity to improve overall activity tolerance and promote recovery  -Pt educated to call for assistance and to transfer with hospital staff only  -Provided education regarding role of OT, POC, & discharge recommendations with pt and wife verbalizing understanding.  Pt had no further questions & when asked whether there were any concerns pt reported none.      Patient left up in chair with all lines intact, call button in reach, nursing notified, and wife present    GOALS:   Multidisciplinary Problems       Occupational Therapy Goals          Problem: Occupational Therapy    Goal Priority Disciplines Outcome  Interventions   Occupational Therapy Goal     OT, PT/OT Ongoing, Progressing    Description: Goals to be met by: 1/2/24     Patient will increase functional independence with ADLs by performing:    UB Dressing with SBA  LE Dressing with Supervision.  Grooming while standing at sink with Supervision.  Toileting from bedside commode with SBA for hygiene and clothing management.   Step transfer to bedside commode with SBA  [place] with VAD management during ADLs                             Time Tracking:     OT Date of Treatment: 12/15/23  OT Start Time: 0850  OT Stop Time: 0906  OT Total Time (min): 16 min    Billable Minutes:Therapeutic Activity 16    OT/PRIETO: OT          12/15/2023

## 2023-12-15 NOTE — ASSESSMENT & PLAN NOTE
-Psychiatry consulted for depressed mood, SI when left alone  -Recommend sitter when alone (possibly with transition to stepdown).  -Starting Remeron 15 mg q HS

## 2023-12-15 NOTE — PROGRESS NOTES
12/15/23 1406   Treatment   Treatment Type SLED   Treatment Status New start   Dialysis Machine Number [id number]   Solutions Labeled and Current  Yes   Access Temporary Cath;Right;IJ   Catheter Dressing Intact  Yes   Alarms Engaged Yes   CRRT Comments CRRT started without issue     PT left in NAD/VSS. Report given to primary RN, will increase UF to goal as tolerated

## 2023-12-15 NOTE — PT/OT/SLP PROGRESS
Speech Language Pathology Treatment    Patient Name:  Radha Abbott   MRN:  [id number]  Admitting Diagnosis: LVAD (left ventricular assist device) present    Recommendations:                 General Recommendations:  ENT evaluation, Dysphagia therapy, and Modified barium swallow study  Diet recommendations:  NPO, Liquid Diet Level: NPO   Aspiration Precautions: Continue alternate means of nutrition and Strict aspiration precautions   General Precautions: Standard, LVAD, fall, sternal  Communication strategies:  provide increased time to answer    Assessment:     Radha Abbott is a 63 y.o. male with an SLP diagnosis of Dysphagia.     Subjective     Awake/alert  Spouse at bedside  RN at the bedside     Pain/Comfort:  Pain Rating 1: 0/10  Pain Rating Post-Intervention 1: 0/10      Objective:     Has the patient been evaluated by SLP for swallowing?   Yes  Keep patient NPO? No     Pt repositioned upright in bed for PO trials. Pt with wet dysphonic vocal quality upon arrival. Cough weak and unproductive.   Per ENT assessment 12/14: The left vocal fold is immobile with glottic gap on phonation. Pooling of secretions in hypopharynx with aspiration of secretions into trachea     NG tube remains in place. SLP discussed with pt and spouse at length laryngeal anatomy and physiology and impact on swallow function within SLP scope. SLP provided pt with written instructions re: dysphagia exercise regimen to  complete outside of speech therapy sessions. Pt demonstrated ability to execute chin tuck against resistance x3, effortful swallow x5 and akilah manuever x1 with fair ability. Ice chips offered intermittently to assist with stimulating swallow. Additional objective swallow assessment not warranted at this time given ongoing blatant clinical signs of aspiration. PO trials remain pending ENT intervention. Both pt and spouse verbalized understanding.     Goals:   Multidisciplinary Problems       SLP Goals          Problem: SLP    Goal  Priority Disciplines Outcome   SLP Goal     SLP    Description: Speech Language Pathology Goals  Goals expected to be met by 12/24    1. Pt will participate in ongoing assessment of swallow function to help determine least restrictive diet                            Plan:     Patient to be seen:  4 x/week   Plan of Care reviewed with:  patient, spouse   SLP Follow-Up:  Yes       Discharge recommendations:    TBD      Time Tracking:     SLP Treatment Date:   12/15/23  Speech Start Time:  0809  Speech Stop Time:  0825     Speech Total Time (min):  16 min    Billable Minutes: Treatment Swallowing Dysfunction 8 and Self Care/Home Management Training 8    12/15/2023

## 2023-12-15 NOTE — PLAN OF CARE
Goals remain appropriate. Continue current POC.     Goals to be met by: 1/2/24     Patient will increase functional independence with ADLs by performing:    UB Dressing with SBA  LE Dressing with Supervision.  Grooming while standing at sink with Supervision.  Toileting from bedside commode with SBA for hygiene and clothing management.   Step transfer to bedside commode with SBA  [place] with VAD management during ADLs

## 2023-12-15 NOTE — ASSESSMENT & PLAN NOTE
BG goal 140-180.      - Novolog 9 units with scheduled TF. (Hold if TF is stopped or if BG < 100 mg/dL); resumed due to BG excursions on TF. (20% increase due to BG above goal)   - BG checks q4hr while on TF/NPO   - Novolog (aspart) insulin prn for BG excursions [facility] (150/25)   - Hypoglycemia protocol in place    ** Please notify Endocrine for any change and/or advance in diet**  ** Please call Endocrine for any BG related issues **    Discharge Planning:   TBD. Please notify endocrinology prior to discharge.

## 2023-12-15 NOTE — PT/OT/SLP PROGRESS
Physical Therapy Partial Co-Treatment    Patient Name:  Radha Abbott   MRN:  [id number]  Admitting Diagnosis:  LVAD (left ventricular assist device) present   Recent Surgery: Procedure(s) (LRB):  CLOSURE, WOUND, STERNUM (N/A)  INSERTION, GRAFT, PERICARDIUM  DRAINAGE, PLEURAL EFFUSION 11 Days Post-Op  Admit Date: 11/9/2023  Length of Stay: 36 days    Recommendations:     Discharge Recommendations: High Intensity Therapy  Discharge Equipment Recommendations: to be determined by next level of care   Barriers to discharge:  increased level of assist needed    Appropriate transfer level with nursing staff: bed <> chair transfer with assist x 2    Plan:     During this hospitalization, patient to be seen 6 x/week to address the identified rehab impairments via gait training, therapeutic activities, therapeutic exercises, neuromuscular re-education and progress towards the established goals.  Plan of Care Expires:  01/11/24  Plan of Care Reviewed with: patient, spouse    Assessment     Radha Abbott is a 63 y.o. male admitted with a medical diagnosis of LVAD (left ventricular assist device) present. Pt tolerated treatment session well today. He was able to progress functionally as seen by progressing to transferring to bedside chair and sitting for one hour. He still requires substantial assist for all mobility due to deconditioning and generalized weakness. Pt will continue to benefit from skilled PT services during this hospital admit to continue to improve transfer ability and efficiency as well as continue to progress pt's ambulation distance and cardiopulmonary endurance to maximize pt's functional independence and return to PLOF.    Problem List: weakness, impaired endurance, impaired self care skills, impaired functional mobility, gait instability, impaired balance, decreased upper extremity function, decreased lower extremity function, decreased safety awareness, pain, impaired coordination, impaired fine motor,  impaired skin, impaired cardiopulmonary response to activity.  Rehab Prognosis: Good; patient would benefit from acute skilled PT services to address these deficits and reach maximum level of function.      Goals:   Multidisciplinary Problems       Physical Therapy Goals          Problem: Physical Therapy    Goal Priority Disciplines Outcome Goal Variances Interventions   Physical Therapy Goal     PT, PT/OT Ongoing, Progressing     Description: Goals to be met by: 23     Patient will increase functional independence with mobility by performin. Sit to stand transfer with modified independence  2. Bed to chair transfer with supervision  3. Gait  x 150 feet with supervision using physician approved AD with standing erst breaks prn.   4. Lower extremity exercise program x30 reps per handout, with independence  5. Recite 3/3 sternal precautions and remain complaint with precautions throughout session with no verbal cues                     Multidisciplinary Problems (Resolved)          Problem: Physical Therapy    Goal Priority Disciplines Outcome Goal Variances Interventions   Physical Therapy Goal   (Resolved)     PT, PT/OT Met     Description: Goals to be met by: 23     Patient will increase functional independence with mobility by performin. Evaluate pt's need for physical therapy.                          Subjective     RN notified prior to session. Wife present upon PT entrance into room. Patient agreeable to PT evaluation.    Chief Complaint: pt reporting no complaints; did endorse fatigue after sitting in chair for 1 hr  Patient/Family Comments/goals: get stronger  Pain/Comfort:  Pain Rating 1: 0/10  Pain Rating Post-Intervention 1: 0/10    Objective:     Cognition:   Alert and Cooperative  AxOx4  Command following: Follows multistep verbal commands  Fluency: clear/fluent  General Precautions: Standard, Cardiac fall, LVAD, sternal   Orthopedic Precautions:N/A   Braces: N/A   Body mass  index is 23.32 kg/m².  Oxygen Device:    Vitals: BP (!) 76/0 (BP Location: Left arm, Patient Position: Lying)   Pulse 90   Temp 97.7 °F (36.5 °C) (Oral)   Resp (!) 22   Ht 6' (1.829 m)   Wt 78 kg (171 lb 15.3 oz)   SpO2 96%   BMI 23.32 kg/m²     Outcome Measures:  AM-PAC 6 CLICK MOBILITY  Turning over in bed (including adjusting bedclothes, sheets and blankets)?: 2  Sitting down on and standing up from a chair with arms (e.g., wheelchair, bedside commode, etc.): 2  Moving from lying on back to sitting on the side of the bed?: 2  Moving to and from a bed to a chair (including a wheelchair)?: 2  Need to walk in hospital room?: 1  Climbing 3-5 steps with a railing?: 1  Basic Mobility Total Score: 10     Session 1:     Patient found HOB elevated with: telemetry, blood pressure cuff, pulse ox (continuous), PICC line, arterial line, NG tube, Trialysis     Additional staff present: OT; OT for cotx due to pt's multiple medical comorbidities and functional deficits req'ing two skilled therapists to appropriately progress pt's musculoskeletal strength, neuromuscular control, and endurance while taking into consideration severe medical acuity in the ICU    Functional Mobility:    Bed Mobility:   Supine to Sit: maximal assistance for trunk management; to Lt side of bed  Scooting anteriorly to EOB to have both feet planted on floor: contact guard assistance     Sitting Balance at Edge of Bed:  Static Sitting Balance: Good- : able to accept minimal resistance  Dynamic Sitting Balance: Good- : able to sit unsupported and weight shift across midline minimally  Assistance Level Required: Stand-by Assistance  Time: ~5 minutes  Postural deviations noted: slouched posture and rounded shoulders  Comments: no c/o dizziness/lightheadedness    Transfers:   Sit <> Stand Transfer: moderate assistance and of 2 persons with hand-held assist. [id number] from EOB  Bed <> Chair Transfer Squat Pivot technique: total assistance and of 2  persons with no AD. Chair on the Rt    Standing Balance:  Static Standing Balance: Poor-: requires maximal assistance and UE support to maintain standing balance without loss  Dynamic Standing Balance: Poor : able to stand with moderate assistance and minimally reach ipsilaterally. Unable to cross midline.  Assistance Level Required: Maximum Assistance and of 2 persons  Patient used: hand-held assist   Comments: Pt required Max x 2 to maintain standing  due to increased hip/knee flexion and FFP.    Patient left up in chair with all lines intact, call button in reach, and RN and wife present.    Session 2: PT returned after 1 hr to assist pt back to bed due to increased weakness    Patient found up in chair with: telemetry, blood pressure cuff, pulse ox (continuous), PICC line, arterial line, NG tube, Trialysis     Additional staff present: RN    Functional Mobility:    Bed Mobility:   Scooting to HOB via supine bridge: total assistance and 2 persons   Sit to Supine: maximal assistance for LE management and for trunk management; to Lt side of bed    Sitting Balance at Edge of Bed:  Static Sitting Balance: Good- : able to accept minimal resistance  Dynamic Sitting Balance: Good- : able to sit unsupported and weight shift across midline minimally  Assistance Level Required: Stand-by Assistance    Transfers:   Chair <> bed Transfer Squat Pivot technique: total assistance and of 2 persons with no AD. Bed on the Rt    Education:  Time provided for education, counseling and discussion of health disposition in regards to patient's current status  All questions answered within PT scope of practice and to patient's satisfaction  PT role in POC to address current functional deficits  Pt educated on proper body mechanics, safety techniques, and energy conservation with PT facilitation and cueing throughout session  Whiteboard updated with therapist name and pt's current mobility status documented above  Patient is appropriate  for drawsheet transfer to/from cardiac chair with nursing staff  Pt educated on Post-op sternal precautions, including no lifting > 5 lbs, pulling or pushing with BUEs. Able to move arms within a pain free range.     Patient left HOB elevated with all lines intact, call button in reach, and RN and wife present.      Time Tracking:     PT Received On: 12/15/23  PT Start Time: 0850     PT Stop Time: 0906  PT Start Time: 1001     PT Stop Time: 1010  PT Total Time (min): 16 min + 9 minutes = 25 minutes TOTAL    Billable Minutes:   Therapeutic Activity 9 minutes and Therapeutic Exercise 16 minutes    Treatment Type: Treatment  PT/PTA: PT       12/15/2023

## 2023-12-15 NOTE — PROGRESS NOTES
[facility] - Surgical Intensive Care  Endocrinology  Progress Note    Admit Date: 11/9/2023     Reason for Consult: Management of T2DM, Hyperglycemia     Surgical Procedure and Date: n/a    Diabetes diagnosis year: 1998    Home Diabetes Medications:  Metformin (off since October)   Lab Results   Component Value Date    HGBA1C 7.6 (H) 10/12/2023       How often checking glucose at home?  Once daily in the AM    BG readings on regimen: 150-160s  Hypoglycemia on the regimen?  No  Missed doses on regimen?  n/a    Diabetes Complications include:     Hyperglycemia    Complicating diabetes co morbidities:   CAD s/p CABG, HTN, HLD      HPI:   Patient is a 63 y.o. male with a diagnosis of CAD s/p 3v CABG (unclear anatomy, 2009), ICM with a recent EF of 15-20% s/p ICD ([employer]ik 2009), DM2 (a1c 7.7), HTN, HLD, Vfib on amio who presents to the ED with CC of SOB. In the ED he was AF with stable VS on RA.  CBC showing chronic anemia.  CMP notable for acute on chronic CKD with baseline around 2.1 and Cr now 2.6.  BNP elevated.  Lactate neg.  CXR showing pulmonary edema. He was subsequently admitted to the CCU for diuresis.  Endocrinology consulted for management of T2DM.          Interval HPI:   Overnight events: No acute events overnight. Patient in room 11271/73107 A. Blood glucose stable. BG at and above goal on current insulin regimen (SSI ). Steroid use- None. 11 Days Post-Op  Renal function- Normal   Vasopressors-  None       Endocrine will continue to follow and manage insulin orders inpatient.         Diet NPO Except for: Medication (per NG tube)     Eating:   NPO  Nausea: No  Hypoglycemia and intervention: No  Fever: No  TPN and/or TF: Yes  If yes, type of TF/TPN and rate: TF @ 40 ml/hr    BP (!) 76/0 (BP Location: Left arm, Patient Position: Lying)   Pulse 91   Temp 97.7 °F (36.5 °C) (Oral)   Resp (!) 25   Ht 6' (1.829 m)   Wt 78 kg (171 lb 15.3 oz)   SpO2 96%   BMI 23.32 kg/m²     Labs Reviewed and Include   "  Recent Labs   Lab 12/15/23  0305   *   CALCIUM 8.3*   ALBUMIN 1.9*   PROT 5.9*      K 3.5   CO2 22*      BUN 44*   CREATININE 3.0*   ALKPHOS 165*   ALT 24   AST 51*   BILITOT 1.3*     Lab Results   Component Value Date    WBC 18.42 (H) 12/15/2023    HGB 6.5 (L) 12/15/2023    HCT 20.6 (L) 12/15/2023    MCV 93 12/15/2023     (H) 12/15/2023     No results for input(s): "TSH", "FREET4" in the last 168 hours.  Lab Results   Component Value Date    HGBA1C 7.6 (H) 10/12/2023       Nutritional status:   Body mass index is 23.32 kg/m².  Lab Results   Component Value Date    ALBUMIN 1.9 (L) 12/15/2023    ALBUMIN 2.0 (L) 12/14/2023    ALBUMIN 2.0 (L) 12/13/2023     Lab Results   Component Value Date    PREALBUMIN 12 (L) 12/09/2023    PREALBUMIN 19 (L) 12/01/2023    PREALBUMIN 27 11/15/2023       Estimated Creatinine Clearance: 28.4 mL/min (A) (based on SCr of 3 mg/dL (H)).    Accu-Checks  Recent Labs     12/13/23  1705 12/13/23 2006 12/13/23  2359 12/14/23  0401 12/14/23  1308 12/14/23  1657 12/14/23  2008 12/15/23  0001 12/15/23  0405 12/15/23  0814   POCTGLUCOSE 231* 147* 187* 212* 209* 135* 135* 126* 164* 228*       Current Medications and/or Treatments Impacting Glycemic Control  Immunotherapy:    Immunosuppressants       None          Steroids:   Hormones (From admission, onward)      Start     Stop Route Frequency Ordered    12/13/23 2200  melatonin tablet 6 mg         -- Oral Nightly PRN 12/13/23 2100          Pressors:    Autonomic Drugs (From admission, onward)      Start     Stop Route Frequency Ordered    12/10/23 1045  EPINEPHrine 5 mg in dextrose 5% 250 mL infusion (premix)         -- IV Continuous 12/10/23 0943    12/09/23 2100  midodrine tablet 15 mg         -- Oral 3 times daily 12/09/23 1268          Hyperglycemia/Diabetes Medications:   Antihyperglycemics (From admission, onward)      Start     Stop Route Frequency Ordered    12/15/23 1200  insulin aspart U-100 pen 9 Units         " -- SubQ 6 times per day 12/15/23 0840    12/11/23 1027  insulin aspart U-100 pen 0-10 Units         -- SubQ Every 4 hours PRN 12/11/23 0927            ASSESSMENT and PLAN    Cardiac/Vascular  * LVAD (left ventricular assist device) present  Optimize BG control to improve wound healing        PAF (paroxysmal atrial fibrillation)  May increase insulin resistance.         Acute on chronic combined systolic and diastolic CHF, NYHA class 4  Managed per primary team  Optimize BG control  S/p LVAD     Renal/  Acute renal failure with acute tubular necrosis superimposed on stage 3b chronic kidney disease  Lab Results   Component Value Date    CREATININE 3.0 (H) 12/15/2023     Avoid insulin stacking  Titrate insulin slowly       Endocrine  Type 2 diabetes mellitus without complication, without long-term current use of insulin  BG goal 140-180.      - Novolog 9 units with scheduled TF. (Hold if TF is stopped or if BG < 100 mg/dL); resumed due to BG excursions on TF. (20% increase due to BG above goal)   - BG checks q4hr while on TF/NPO   - Novolog (aspart) insulin prn for BG excursions [facility] (150/25)   - Hypoglycemia protocol in place    ** Please notify Endocrine for any change and/or advance in diet**  ** Please call Endocrine for any BG related issues **    Discharge Planning:   TBD. Please notify endocrinology prior to discharge.                 Erasmo Parrish, DNP, FNP  Endocrinology  [facility] - Surgical Intensive [facility]

## 2023-12-15 NOTE — ASSESSMENT & PLAN NOTE
Patient is identified as having Combined Systolic and Diastolic heart failure that is Acute on chronic. CHF is currently uncontrolled due to JVD, Rales/crackles on pulmonary exam, and Pulmonary edema/pleural effusion on CXR. Latest ECHO performed and demonstrates- Results for orders placed during the hospital encounter of 11/09/23    Echo    Interpretation Summary    Left Ventricle: The left ventricle is moderately dilated. Mildly increased ventricular mass. Normal wall thickness. There is eccentric hypertrophy. Severe global hyperkinesis present. There is severely reduced systolic function with a visually estimated ejection fraction of 10 -15%. Grade III diastolic dysfunction.    Right Ventricle: Normal right ventricular cavity size. Systolic function is normal. Pacemaker lead present in the ventricle.    Left Atrium: Left atrium is severely dilated.    Mitral Valve: There is annular and bileaflet tethering. There is moderate regurgitation with a centrally directed jet.    Tricuspid Valve: There is mild regurgitation with a centrally directed jet.    Pulmonary Artery: The estimated pulmonary artery systolic pressure is 41 mmHg.    IVC/SVC: Normal venous pressure at 3 mmHg.    last BNP reviewed- and noted below   Recent Labs   Lab 12/15/23  0311   BNP 1,312*       - management per primary team  - LVAD for mechanical support

## 2023-12-15 NOTE — ASSESSMENT & PLAN NOTE
IVÁN on CKD2. Baseline Cr of 1.7-2.0.   - Hold ARNi, entresto  -Trend BMPs daily   -Nephrology following   -SLED/SCUF for volume removal began on 12/1, on hold since 12/13/23  -Did not respond to diuretic challenge with 240 mg IV Lasix, 1000 mg Diuril, and 500 mg IV Diamox done 12/14  -Midodrine 15 mg TID

## 2023-12-15 NOTE — ASSESSMENT & PLAN NOTE
-multifactorial  -likely metabolic encephalopathy +/- icu delirium   -CTH 12/7 negative for acute process  -Continue RRT for metabolic clearance as needed  -EEG done over the weekend no seizures detected, just generalized slowing  -provide frequent re-orientation  -Promote sleep/wake cycle   -Neurology consulted   - Much more awake, continue to encourage and motivate

## 2023-12-15 NOTE — ASSESSMENT & PLAN NOTE
-HeartMate 3 Implanted  12/1/2023  as DT  -HTS Primary  -Implanted by Dr. Washington  -Continue Coumadin, dosing by PharmD.  Goal INR 2.0-3.0 . Subtherapeutic today. On Heparin gtt   -Antiplatelets Not on ASA  -LDH is stable overall today. Will continue to monitor daily.  -Speed set at  4900   rpm, LSL 4500 rpm   -Interrogation notable for no events  -ECHO 12/11 AV does not open, IVS midline, LVEDD 6.1, with HM3 speed set to 4900 rpm  -Not listed for OHTx, declined for OHTX due to comorbidities         Procedure: Device Interrogation Including analysis of device parameters  Current Settings: Ventricular Assist Device  Review of device function is stable/unstable stable        12/15/2023    10:00 AM 12/15/2023     8:00 AM 12/15/2023     7:00 AM 12/15/2023     6:00 AM 12/15/2023     5:00 AM 12/15/2023     4:00 AM 12/15/2023     3:00 AM   TXP LVAD INTERROGATIONS   Type HeartMate3  HeartMate3 HeartMate3 HeartMate3 HeartMate3 HeartMate3   Flow 4.2 4.2 4.1 4.3 4.3 4.3 4.2   Speed 4900 4900 4900 4900 4900 4900 4900   PI 3.7 3.7 3.4 3.2 3.1 3.2 3.4   Power ([place]) 3.3 3.3 3.3 3.2 3.3 3.3 3.2   LSL 4500 4500 4500 4500 4500 4500 4500   Pulsatility No Pulse No Pulse Intermittent pulse Intermittent pulse Intermittent pulse Intermittent pulse Intermittent pulse

## 2023-12-15 NOTE — ASSESSMENT & PLAN NOTE
- suspect acute tubular injury secondary to hypotension/cardiogenic shock likely compounded by intra-arterial contrast and anemia with urinary sediment with granular casts  - failed diuretic trial, anuric for which will resume with SLED for metabolic clearance and volume management as patient receiving one unit of pRBCs today  - Q8H RFP, magnesium and ionized calcium per SLED protocol with citrate  - renal diet/tube feeds when not NPO, volume restriction per primary team  - strict I/O's and daily weights  - keep MAP > 65 mmHg  - renally all dose medications to eGFR  - avoid nephrotoxic agents wean feasible (i.e. NSAIDs, intra-arterial contrast, supra-therapeutic vancomycin levels, etc.)

## 2023-12-15 NOTE — PROGRESS NOTES
12/15/2023  Alondra Dykes    Current provider:  Sandhya Price MD    Device interrogation:      12/15/2023    11:00 AM 12/15/2023    10:00 AM 12/15/2023     8:00 AM 12/15/2023     7:00 AM 12/15/2023     6:00 AM 12/15/2023     5:00 AM 12/15/2023     4:00 AM   TXP LVAD INTERROGATIONS   Type HeartMate3 HeartMate3  HeartMate3 HeartMate3 HeartMate3 HeartMate3   Flow 4.4 4.2 4.2 4.1 4.3 4.3 4.3   Speed 4900 4900 4900 4900 4900 4900 4900   PI 3 3.7 3.7 3.4 3.2 3.1 3.2   Power ([place]) 3.3 3.3 3.3 3.3 3.2 3.3 3.3   LSL 4500 4500 4500 4500 4500 4500 4500   Pulsatility No Pulse No Pulse No Pulse Intermittent pulse Intermittent pulse Intermittent pulse Intermittent pulse          Rounded on [facility] to ensure all mechanical assist device settings (IABP or VAD) were appropriate and all parameters were within limits.  I was able to ensure all back up equipment was present, the staff had no issues, and the Perfusion Department daily rounding was complete.      For implantable VADs: Interrogation of Ventricular assist device was performed with analysis of device parameters and review of device function. I have personally reviewed the interrogation findings and agree with findings as stated.     In emergency, the nursing units have been notified to contact the perfusion department either by:  Calling [phone] from 630am to 4pm Mon thru Fri, utilizing the On-Call Finder functionality of Epic and searching for Perfusion, or by contacting the hospital  from 4pm to 630am and on weekends and asking to speak with the perfusionist on call.    1:41 PM

## 2023-12-15 NOTE — PLAN OF CARE
SICU PLAN OF CARE NOTE    Dx: LVAD (left ventricular assist device) present    Vital Signs: BP (!) 76/0   Pulse 99   Temp 97.8 °F (36.6 °C) (Oral)   Resp (!) 27   Ht 6' (1.829 m)   Wt 78 kg (171 lb 15.3 oz)   SpO2 97%   BMI 23.32 kg/m²     SHIFT EVENTS:  Afebrile. Tmax 97.8. NSR. AICD on. BP WDL; midodrine 15mg given TID. CVP 8,7,7,11. SvO2 45%; HTS team notified. 1 u PRBC given prior to initiation of CRRT. Pt tolerating SLED well; UF currently at 250. Bladder scan showing 15cc. LVAD dressing change performed; RN and pt spouse. Pt worked with PT/OT; OOBTC; full assist. 1 hr spent in chair.     Neuro: AAO x4, Follows Commands, and Moves All Extremities    Respiratory: Room Air    Cardiac: NSR   LVAD - HM3  Speed 4900  LSL: 4500  Flow: 3.9-4.4  Power: 2.9-4.7  PI: 3.1-3.3    Diet: Tube Feeds  Novasource @ 40cc (goal)     Gtts: Heparin     Urine Output: Anuric     Labs/Accuchecks: q4h accuchecks; RFP; daily labs.    SKIN NOTE:  Skin: Breakdown found on buttocks and sacrum;     Skin precautions maintained including:  Sacrum and heels with foam dressing in place for pressure protection. Frequent weight shift assistance provided Q2 hr to prevent further breakdown. Bed plugged in and mattress inflated;[employer] bed utilized. Adhesive use limited. Heels elevated off bed. Positioned off wounds. Pressure points protected and positioning supports utilized.  Skin-to-device areas padded. Skin-to-skin areas padded    POC reviewed with patient and spouse Arlyn; questions and concerns addressed. See flow sheets for full assessment details.

## 2023-12-15 NOTE — SUBJECTIVE & OBJECTIVE
"Interval HPI:   Overnight events: No acute events overnight. Patient in room 29078/91521 A. Blood glucose stable. BG at and above goal on current insulin regimen (SSI ). Steroid use- None. 11 Days Post-Op  Renal function- Normal   Vasopressors-  None       Endocrine will continue to follow and manage insulin orders inpatient.         Diet NPO Except for: Medication (per NG tube)     Eating:   NPO  Nausea: No  Hypoglycemia and intervention: No  Fever: No  TPN and/or TF: Yes  If yes, type of TF/TPN and rate: TF @ 40 ml/hr    BP (!) 76/0 (BP Location: Left arm, Patient Position: Lying)   Pulse 91   Temp 97.7 °F (36.5 °C) (Oral)   Resp (!) 25   Ht 6' (1.829 m)   Wt 78 kg (171 lb 15.3 oz)   SpO2 96%   BMI 23.32 kg/m²     Labs Reviewed and Include    Recent Labs   Lab 12/15/23  0305   *   CALCIUM 8.3*   ALBUMIN 1.9*   PROT 5.9*      K 3.5   CO2 22*      BUN 44*   CREATININE 3.0*   ALKPHOS 165*   ALT 24   AST 51*   BILITOT 1.3*     Lab Results   Component Value Date    WBC 18.42 (H) 12/15/2023    HGB 6.5 (L) 12/15/2023    HCT 20.6 (L) 12/15/2023    MCV 93 12/15/2023     (H) 12/15/2023     No results for input(s): "TSH", "FREET4" in the last 168 hours.  Lab Results   Component Value Date    HGBA1C 7.6 (H) 10/12/2023       Nutritional status:   Body mass index is 23.32 kg/m².  Lab Results   Component Value Date    ALBUMIN 1.9 (L) 12/15/2023    ALBUMIN 2.0 (L) 12/14/2023    ALBUMIN 2.0 (L) 12/13/2023     Lab Results   Component Value Date    PREALBUMIN 12 (L) 12/09/2023    PREALBUMIN 19 (L) 12/01/2023    PREALBUMIN 27 11/15/2023       Estimated Creatinine Clearance: 28.4 mL/min (A) (based on SCr of 3 mg/dL (H)).    Accu-Checks  Recent Labs     12/13/23  1705 12/13/23 2006 12/13/23  2359 12/14/23  0401 12/14/23  1308 12/14/23  1657 12/14/23  2008 12/15/23  0001 12/15/23  0405 12/15/23  0814   POCTGLUCOSE 231* 147* 187* 212* 209* 135* 135* 126* 164* 228*       Current Medications and/or " Treatments Impacting Glycemic Control  Immunotherapy:    Immunosuppressants       None          Steroids:   Hormones (From admission, onward)      Start     Stop Route Frequency Ordered    12/13/23 2200  melatonin tablet 6 mg         -- Oral Nightly PRN 12/13/23 2100          Pressors:    Autonomic Drugs (From admission, onward)      Start     Stop Route Frequency Ordered    12/10/23 1045  EPINEPHrine 5 mg in dextrose 5% 250 mL infusion (premix)         -- IV Continuous 12/10/23 0943    12/09/23 2100  midodrine tablet 15 mg         -- Oral 3 times daily 12/09/23 1839          Hyperglycemia/Diabetes Medications:   Antihyperglycemics (From admission, onward)      Start     Stop Route Frequency Ordered    12/15/23 1200  insulin aspart U-100 pen 9 Units         -- SubQ 6 times per day 12/15/23 0840    12/11/23 1027  insulin aspart U-100 pen 0-10 Units         -- SubQ Every 4 hours PRN 12/11/23 0972

## 2023-12-15 NOTE — PROGRESS NOTES
[facility] - Surgical Intensive Care  Heart Transplant  Progress Note    Patient Name: Radha Abbott  MRN: [id number]  Admission Date: 11/9/2023  Hospital Length of Stay: 36 days  Attending Physician: Sandhya Price MD  Primary Care Provider: Vasu Kong MD  Principal Problem:LVAD (left ventricular assist device) present    Subjective:   Interval History: Sitting up in chair with wife at bedside. Alert and oriented, can whisper only. Off of all pressors and inotropes, but is on Midodrine for BP support. SLED held yesterday. Did not respond to diuretic challenge yesterday, remains anuric. Hgb 6.5. If SLED resumed today will give a unit of PC's. CVP 8, sVO2 45 with CO/CI 6.14/3.08. Cultures all remain -ve - ID D/C'd antibiotics yesterday. Unable to progress with Speech Therapy - messaged ENT to see if they can augment his left vocal cord today    Continuous Infusions:   sodium chloride 0.9% 5 mL/hr at 12/15/23 1000    dextrose 10 % in water (D10W)      EPINEPHrine Stopped (12/13/23 3044)    heparin (porcine) in D5W 9 Units/kg/hr (12/15/23 1000)     Scheduled Meds:   amiodarone  400 mg Per NG tube Daily    atorvastatin  40 mg Per NG tube Daily    insulin aspart U-100  9 Units Subcutaneous 6 times per day    midodrine  15 mg Oral TID    mirtazapine  15 mg Per NG tube Nightly    pantoprazole  40 mg Intravenous Daily    polyethylene glycol  17 g Per NG tube Daily    psyllium husk (aspartame)  1 packet Oral Daily    senna-docusate 8.6-50 mg  1 tablet Per NG tube Daily    warfarin  4 mg Per NG tube Daily     PRN Meds:acetaminophen, albuterol sulfate, bisacodyL, dextrose 10 % in water (D10W), dextrose 10%, dextrose 10%, glucagon (human recombinant), insulin aspart U-100, magnesium hydroxide 400 mg/5 ml, melatonin, potassium chloride, potassium chloride, QUEtiapine, Flushing PICC/Midline Protocol **AND** [DISCONTINUED] sodium chloride 0.9% **AND** sodium chloride 0.9%    Review of patient's allergies indicates:  No  Known Allergies  Objective:     Vital Signs (Most Recent):  Temp: 97.7 °F (36.5 °C) (12/15/23 0700)  Pulse: 90 (12/15/23 1030)  Resp: (!) 22 (12/15/23 1030)  BP: (!) 76/0 (12/15/23 0700)  SpO2: 96 % (12/15/23 0700) Vital Signs (24h Range):  Temp:  [97.7 °F (36.5 °C)-98.6 °F (37 °C)] 97.7 °F (36.5 °C)  Pulse:  [] 90  Resp:  [15-26] 22  SpO2:  [96 %-100 %] 96 %  BP: (70-76)/(0) 76/0  Arterial Line BP: (66-87)/(50-81) 70/52     Patient Vitals for the past 72 hrs (Last 3 readings):   Weight   12/15/23 0600 78 kg (171 lb 15.3 oz)   12/14/23 0445 78.9 kg (174 lb)   12/13/23 0228 78.5 kg (173 lb)     Body mass index is 23.32 kg/m².      Intake/Output Summary (Last 24 hours) at 12/15/2023 1059  Last data filed at 12/15/2023 1000  Gross per 24 hour   Intake 1336.68 ml   Output --   Net 1336.68 ml       Hemodynamic Parameters:       Telemetry: SR       Physical Exam  Constitutional:       Comments: Cachectic   HENT:      Head: Normocephalic and atraumatic.   Eyes:      Conjunctiva/sclera: Conjunctivae normal.      Pupils: Pupils are equal, round, and reactive to light.   Cardiovascular:      Rate and Rhythm: Normal rate and regular rhythm.      Comments: Do not appreciate elevated neck veins  Pulmonary:      Effort: Pulmonary effort is normal.      Breath sounds: Normal breath sounds.   Abdominal:      General: Bowel sounds are normal.      Palpations: Abdomen is soft.   Musculoskeletal:         General: No swelling. Normal range of motion.   Skin:     General: Skin is dry.      Capillary Refill: Capillary refill takes 2 to 3 seconds.      Comments: Extremities cool to touch   Neurological:      Mental Status: He is alert and oriented to person, place, and time.            Significant Labs:  CBC:  Recent Labs   Lab 12/13/23  0316 12/13/23  0825 12/14/23  0258 12/14/23  1538 12/15/23  0305   WBC 26.53*  --  19.65*  --  18.42*   RBC 2.32*  --  2.20*  --  2.21*   HGB 6.8*  --  6.4*  --  6.5*   HCT 21.0*   < > 19.7* 17*  20.6*     --  416  --  458*   MCV 91  --  90  --  93   MCH 29.3  --  29.1  --  29.4   MCHC 32.4  --  32.5  --  31.6*    < > = values in this interval not displayed.     BNP:  Recent Labs   Lab 12/11/23  0405 12/13/23 0316 12/15/23  0311   BNP 2,154* 1,460* 1,312*     CMP:  Recent Labs   Lab 12/13/23  1700 12/14/23  0258 12/14/23  0801 12/14/23  1538 12/14/23  2147 12/15/23  0305   *  --    < > 119* 118* 151*   CALCIUM 8.3*  --    < > 7.5* 8.0* 8.3*   ALBUMIN 2.0* 2.0*  --   --   --  1.9*   PROT 6.3 6.3  --   --   --  5.9*   *  --    < > 138 137 137   K 4.1  --    < > 3.4* 3.5 3.5   CO2 20*  --    < > 19* 22* 22*     --    < > 108 104 103   BUN 10  --    < > 31* 40* 44*   CREATININE 0.9  --    < > 2.1* 2.5* 3.0*   ALKPHOS 172* 198*  --   --   --  165*   ALT 22 20  --   --   --  24   AST 41* 40  --   --   --  51*   BILITOT 1.6* 1.6*  --   --   --  1.3*    < > = values in this interval not displayed.      Coagulation:   Recent Labs   Lab 12/13/23 0316 12/13/23  1130 12/14/23  0258 12/14/23  0801 12/15/23  0305   INR  --  1.4*  --  1.6* 1.8*   APTT 41.2*  --  50.7*  --  71.2*     LDH:  Recent Labs   Lab 12/13/23 0316 12/14/23  0258 12/15/23  0305   * 565* 577*     Microbiology:  Microbiology Results (last 7 days)       Procedure Component Value Units Date/Time    Blood culture [[id number]] Collected: 12/10/23 1240    Order Status: Completed Specimen: Blood from Peripheral, Antecubital, Right Updated: 12/14/23 2012     Blood Culture, Routine No Growth to date      No Growth to date      No Growth to date      No Growth to date      No Growth to date    Blood culture [[id number]] Collected: 12/10/23 1149    Order Status: Completed Specimen: Blood from Line, Jugular, External Right Updated: 12/14/23 2012     Blood Culture, Routine No Growth to date      No Growth to date      No Growth to date      No Growth to date      No Growth to date    Culture, Respiratory with Gram Stain  [[id number]] Collected: 12/11/23 1643    Order Status: Completed Specimen: Respiratory from Sputum, Expectorated Updated: 12/13/23 0954     Respiratory Culture Normal respiratory bobby      No S aureus or Pseudomonas isolated.     Gram Stain (Respiratory) <10 epithelial cells per low power field.     Gram Stain (Respiratory) Rare WBC's     Gram Stain (Respiratory) Rare Gram positive cocci    Fungus culture [[id number]] Collected: 12/06/23 1532    Order Status: Completed Specimen: Body Fluid from Lung, Right Updated: 12/12/23 0915     Fungus (Mycology) Culture Culture in progress    Narrative:      Bronchial Wash    Clostridium difficile EIA [[id number]] Collected: 12/12/23 0110    Order Status: Completed Specimen: Stool Updated: 12/12/23 0418     C. diff Antigen Negative     C difficile Toxins A+B, EIA Negative     Comment: Testing not recommended for children <24 months old.       Culture, Respiratory [[id number]] Collected: 12/06/23 1531    Order Status: Completed Specimen: Respiratory from Bronchial Wash Updated: 12/08/23 1103     Respiratory Culture No growth     Gram Stain (Respiratory) Moderate WBC's     Gram Stain (Respiratory) Rare Gram negative rods    Narrative:      Bronchial Wash            I have reviewed all pertinent labs within the past 24 hours.    Estimated Creatinine Clearance: 28.4 mL/min (A) (based on SCr of 3 mg/dL (H)).    Diagnostic Results:  I have reviewed and interpreted all pertinent imaging results/findings within the past 24 hours.  Assessment and Plan:     63 year old male with hx of CAD s/p 3v CABG (unclear anatomy, 2009), ICM with a recent EF of 15-20% s/p ICD (medtronik 2009), DM2 (a1c 7.7), HTN, HLD, Vfib on amio who presents to the ED with CC of SOB     Pt was recently admitted to [facility] [place] as a transfer.  He was started on a Lasix gtt and did well.  Started on gDMT and discharged home on  5 with plans to follow up in HTS clinic for ongoing transplant evaluation at another facility.   He says that about a few days ago he started to notice LE swelling.  This turned into Nelson and orthopnea.  He says he can't walk to the bathroom without getting SOB.  He also complains of weight gain.  He takes torsemide 20mg BID at home and was told to trial additional Lasix which he did without any improvement.  He was rx metolazone but has not been filled.  He came to the ED     In the ED he was AF with stable VS on RA.  CBC showing chronic anemia.  CMP notable for acute on chronic CKD with baseline around 2.1 and Cr now 2.6.  BNP elevated.  Lactate neg.  CXR showing pulmonary edema.  I evaluated the pt at the bedside.  Bedside TTE showing CVP >15.  He was subsequently admitted to the CCU for diuresis.     He was continued on his home  and was started on a lasix gtt, which he responded well to overnight (net -1700cc. He feels much better this morning as well. Our transplant coordinators have been working on insurance approval and he is now being transferred to [facility] service for transplant evaluation.     * LVAD (left ventricular assist device) present  -HeartMate 3 Implanted  12/1/2023  as DT  -HTS Primary  -Implanted by Dr. Washington  -Continue Coumadin, dosing by PharmD.  Goal INR 2.0-3.0 . Subtherapeutic today. On Heparin gtt   -Antiplatelets Not on ASA  -LDH is stable overall today. Will continue to monitor daily.  -Speed set at  4900   rpm, LSL 4500 rpm   -Interrogation notable for no events  -ECHO 12/11 AV does not open, IVS midline, LVEDD 6.1, with HM3 speed set to 4900 rpm  -Not listed for OHTx, declined for OHTX due to comorbidities         Procedure: Device Interrogation Including analysis of device parameters  Current Settings: Ventricular Assist Device  Review of device function is stable/unstable stable        12/15/2023    10:00 AM 12/15/2023     8:00 AM 12/15/2023     7:00 AM 12/15/2023     6:00 AM 12/15/2023     5:00 AM 12/15/2023     4:00 AM 12/15/2023     3:00 AM   TXP LVAD INTERROGATIONS   Type  HeartMate3  HeartMate3 HeartMate3 HeartMate3 HeartMate3 HeartMate3   Flow 4.2 4.2 4.1 4.3 4.3 4.3 4.2   Speed 4900 4900 4900 4900 4900 4900 4900   PI 3.7 3.7 3.4 3.2 3.1 3.2 3.4   Power ([place]) 3.3 3.3 3.3 3.2 3.3 3.3 3.2   LSL 4500 4500 4500 4500 4500 4500 4500   Pulsatility No Pulse No Pulse Intermittent pulse Intermittent pulse Intermittent pulse Intermittent pulse Intermittent pulse         Vocal cord paralysis, unilateral complete  -Appreciate ENT's help  -Messaged ENT to see if paralyzed left vocal cord could be augmented    Dysphonia  -Speech following closely  -Appreciate ENT's help. Messaged ENT to see if they could augment paralyzed left vocal cord as he is currently unable to progress with SLP    Moderate malnutrition  -RD and SLP following  -Tolerating     Leukocytosis  -WBC peaked at 33k, trending down at 18k. Afebrile  -Concern for septic shock. Was also on hydrocortisone with increasing pressor requirements, now discontinued   -ID consulted, started on empiric micafungin, meropenem, and vanc  -Cultures remain NGTD  -Completed course for possible pneumonia, will discontinue all antibiotics, ID signed off     Adjustment disorder with anxiety  -Starting Remeron 15 mg q HS    Depressed mood  -Psychiatry consulted for depressed mood, SI when left alone  -Recommend sitter when alone (possibly with transition to stepdown).  -Starting Remeron      IVÁN (acute kidney injury)  -Nephrology following      Altered mental status  -multifactorial  -likely metabolic encephalopathy +/- icu delirium   -[facility] 12/7 negative for acute process  -Continue RRT for metabolic clearance as needed  -EEG done over the weekend no seizures detected, just generalized slowing  -provide frequent re-orientation  -Promote sleep/wake cycle   -Neurology consulted   - Much more awake, continue to encourage and motivate    Acute on chronic combined systolic and diastolic CHF, NYHA class 4  Pt with known ICM with an EF of 25% on home   presented with decompensated HF.      -lRHC 11/14 RA 14, PA 70/35, PCWP 32, CO 4.1, CI 2.1  -Repeat RHC 11/20 RA 20, PA 60/29 (39), PCWP 29, CO 4.6, CI 2.3  -ECHO 11/21 showed EF 15-20%, mild RV dysfunction, mild MR, LVED 6.9   -Home  5   -Home GDMT: Entreso 24/26 BID (on hold 2/2 IVÁN), hydralazine 25 q 8hr, all held prior to LVAD implant  -Current GDMT: Continue Hyd/Isordil, Farxiga. Will transition Valsartan to Entresto and add Aldactone  -Home diuretic: Lasix 40 mg BID   -IABP inserted 11/21 and underwent DT HM3 implant 12/1, chest closed 12/4  -LVAD speed increased to 4900 rpm on 12/7   -ECHO 12/11 AV does not open, IVS midline, LVEDD 6.1, with HM3 speed set to 4900 rpm  -Increased pressor requirements with Giapreza, Ketty, epi, and vaso 12/11. Able to wean off giapreza overnight 12/11. He has been weaned off all pressors and inotropes  -CVP 7, SVO2 45%, CO 6.14, CI 3.08,   -Remains on midodrine 15 TID      PAF (paroxysmal atrial fibrillation)  Known hx of pAF. In sinus rhythm on admission, but 1 run of AF RVR overnight. He spontaneously converted.  - Continue home amiodarone 400mg qd  - A/c per primary team      Acute renal failure with acute tubular necrosis superimposed on stage 3b chronic kidney disease  IVÁN on CKD2. Baseline Cr of 1.7-2.0.   - Hold ARNi, entresto  -Trend BMPs daily   -Nephrology following   -SLED/SCUF for volume removal began on 12/1, on hold since 12/13/23  -Did not respond to diuretic challenge with 240 mg IV Lasix, 1000 mg Diuril, and 500 mg IV Diamox done 12/14  -Midodrine 15 mg TID     Type 2 diabetes mellitus without complication, without long-term current use of insulin  -A1c 7.6, not insulin dependent  - MDSSI  -Currently on Insulin gtt   - Endocrine following, appreciate assistance with blood glucose management    CAD (coronary artery disease)  -S/p 3vCABG in 2009  - home ASA, Lipitor    Ventricular tachycardia  Hx VT s/p ICD placement (medtronic 2009)  - Continue home amio  400mg qd      Uninterrupted Critical Care/Counseling Time (not including procedures): 65 minutes      Fany Abbasi NP  Heart Transplant  [facility] - Surgical Intensive Care

## 2023-12-15 NOTE — ASSESSMENT & PLAN NOTE
Lab Results   Component Value Date    CREATININE 3.0 (H) 12/15/2023     Avoid insulin stacking  Titrate insulin slowly

## 2023-12-16 LAB
ALBUMIN SERPL BCP-MCNC: 1.7 G/DL (ref 3.5–5.2)
ALBUMIN SERPL BCP-MCNC: 1.8 G/DL (ref 3.5–5.2)
ALBUMIN SERPL BCP-MCNC: 1.9 G/DL (ref 3.5–5.2)
ALLENS TEST: ABNORMAL
ALLENS TEST: ABNORMAL
ALP SERPL-CCNC: 230 U/L (ref 55–135)
ALT SERPL W/O P-5'-P-CCNC: 23 U/L (ref 10–44)
ANION GAP SERPL CALC-SCNC: 10 MMOL/L (ref 8–16)
ANION GAP SERPL CALC-SCNC: 8 MMOL/L (ref 8–16)
ANION GAP SERPL CALC-SCNC: 9 MMOL/L (ref 8–16)
ANION GAP SERPL CALC-SCNC: 9 MMOL/L (ref 8–16)
APTT PPP: 43 SEC (ref 21–32)
AST SERPL-CCNC: 91 U/L (ref 10–40)
BASOPHILS # BLD AUTO: 0.05 K/UL (ref 0–0.2)
BASOPHILS NFR BLD: 0.2 % (ref 0–1.9)
BILIRUB DIRECT SERPL-MCNC: 0.6 MG/DL (ref 0.1–0.3)
BILIRUB SERPL-MCNC: 1.3 MG/DL (ref 0.1–1)
BLD PROD TYP BPU: NORMAL
BLOOD UNIT EXPIRATION DATE: NORMAL
BLOOD UNIT TYPE CODE: 6200
BLOOD UNIT TYPE: NORMAL
BUN SERPL-MCNC: 13 MG/DL (ref 8–23)
BUN SERPL-MCNC: 4 MG/DL (ref 8–23)
BUN SERPL-MCNC: 5 MG/DL (ref 8–23)
BUN SERPL-MCNC: 7 MG/DL (ref 8–23)
BUN SERPL-MCNC: 9 MG/DL (ref 8–23)
BUN SERPL-MCNC: 9 MG/DL (ref 8–23)
CA-I BLDV-SCNC: 1.06 MMOL/L (ref 1.06–1.42)
CA-I BLDV-SCNC: 1.07 MMOL/L (ref 1.06–1.42)
CA-I BLDV-SCNC: 1.1 MMOL/L (ref 1.06–1.42)
CALCIUM SERPL-MCNC: 8 MG/DL (ref 8.7–10.5)
CALCIUM SERPL-MCNC: 8.2 MG/DL (ref 8.7–10.5)
CALCIUM SERPL-MCNC: 8.5 MG/DL (ref 8.7–10.5)
CALCIUM SERPL-MCNC: 8.7 MG/DL (ref 8.7–10.5)
CHLORIDE SERPL-SCNC: 104 MMOL/L (ref 95–110)
CHLORIDE SERPL-SCNC: 104 MMOL/L (ref 95–110)
CHLORIDE SERPL-SCNC: 105 MMOL/L (ref 95–110)
CHLORIDE SERPL-SCNC: 105 MMOL/L (ref 95–110)
CHLORIDE SERPL-SCNC: 106 MMOL/L (ref 95–110)
CHLORIDE SERPL-SCNC: 106 MMOL/L (ref 95–110)
CO2 SERPL-SCNC: 21 MMOL/L (ref 23–29)
CO2 SERPL-SCNC: 22 MMOL/L (ref 23–29)
CO2 SERPL-SCNC: 23 MMOL/L (ref 23–29)
CODING SYSTEM: NORMAL
CREAT SERPL-MCNC: 0.5 MG/DL (ref 0.5–1.4)
CREAT SERPL-MCNC: 0.6 MG/DL (ref 0.5–1.4)
CREAT SERPL-MCNC: 0.7 MG/DL (ref 0.5–1.4)
CREAT SERPL-MCNC: 0.9 MG/DL (ref 0.5–1.4)
CROSSMATCH INTERPRETATION: NORMAL
DELSYS: ABNORMAL
DELSYS: ABNORMAL
DIFFERENTIAL METHOD BLD: ABNORMAL
DISPENSE STATUS: NORMAL
EOSINOPHIL # BLD AUTO: 0.1 K/UL (ref 0–0.5)
EOSINOPHIL NFR BLD: 0.4 % (ref 0–8)
ERYTHROCYTE [DISTWIDTH] IN BLOOD BY AUTOMATED COUNT: 21.4 % (ref 11.5–14.5)
EST. GFR  (NO RACE VARIABLE): >60 ML/MIN/1.73 M^2
GLUCOSE SERPL-MCNC: 112 MG/DL (ref 70–110)
GLUCOSE SERPL-MCNC: 122 MG/DL (ref 70–110)
GLUCOSE SERPL-MCNC: 143 MG/DL (ref 70–110)
GLUCOSE SERPL-MCNC: 157 MG/DL (ref 70–110)
GLUCOSE SERPL-MCNC: 157 MG/DL (ref 70–110)
GLUCOSE SERPL-MCNC: 66 MG/DL (ref 70–110)
HCO3 UR-SCNC: 23.8 MMOL/L (ref 24–28)
HCO3 UR-SCNC: 24.5 MMOL/L (ref 24–28)
HCT VFR BLD AUTO: 21.2 % (ref 40–54)
HCT VFR BLD CALC: 22 %PCV (ref 36–54)
HGB BLD-MCNC: 7.1 G/DL (ref 14–18)
IMM GRANULOCYTES # BLD AUTO: 0.85 K/UL (ref 0–0.04)
IMM GRANULOCYTES NFR BLD AUTO: 4.2 % (ref 0–0.5)
INR PPP: 1.8 (ref 0.8–1.2)
LDH SERPL L TO P-CCNC: 685 U/L (ref 110–260)
LYMPHOCYTES # BLD AUTO: 0.9 K/UL (ref 1–4.8)
LYMPHOCYTES NFR BLD: 4.6 % (ref 18–48)
MAGNESIUM SERPL-MCNC: 1.8 MG/DL (ref 1.6–2.6)
MAGNESIUM SERPL-MCNC: 1.8 MG/DL (ref 1.6–2.6)
MAGNESIUM SERPL-MCNC: 1.9 MG/DL (ref 1.6–2.6)
MCH RBC QN AUTO: 30.7 PG (ref 27–31)
MCHC RBC AUTO-ENTMCNC: 33.5 G/DL (ref 32–36)
MCV RBC AUTO: 92 FL (ref 82–98)
MONOCYTES # BLD AUTO: 1.1 K/UL (ref 0.3–1)
MONOCYTES NFR BLD: 5.5 % (ref 4–15)
NEUTROPHILS # BLD AUTO: 17.1 K/UL (ref 1.8–7.7)
NEUTROPHILS NFR BLD: 85.1 % (ref 38–73)
NRBC BLD-RTO: 1 /100 WBC
NUM UNITS TRANS PACKED RBC: NORMAL
PCO2 BLDA: 36.5 MMHG (ref 35–45)
PCO2 BLDA: 43.2 MMHG (ref 35–45)
PH SMN: 7.36 [PH] (ref 7.35–7.45)
PH SMN: 7.42 [PH] (ref 7.35–7.45)
PHOSPHATE SERPL-MCNC: 1.6 MG/DL (ref 2.7–4.5)
PHOSPHATE SERPL-MCNC: 2.8 MG/DL (ref 2.7–4.5)
PHOSPHATE SERPL-MCNC: <1 MG/DL (ref 2.7–4.5)
PHOSPHATE SERPL-MCNC: <1 MG/DL (ref 2.7–4.5)
PLATELET # BLD AUTO: 445 K/UL (ref 150–450)
PMV BLD AUTO: 11.8 FL (ref 9.2–12.9)
PO2 BLDA: 21 MMHG (ref 40–60)
PO2 BLDA: 25 MMHG (ref 40–60)
POC BE: -1 MMOL/L
POC BE: -1 MMOL/L
POC IONIZED CALCIUM: 1.18 MMOL/L (ref 1.06–1.42)
POC SATURATED O2: 38 % (ref 95–100)
POC SATURATED O2: 43 % (ref 95–100)
POC SVO2: 38 %
POC TCO2: 25 MMOL/L (ref 24–29)
POC TCO2: 26 MMOL/L (ref 24–29)
POCT GLUCOSE: 109 MG/DL (ref 70–110)
POCT GLUCOSE: 113 MG/DL (ref 70–110)
POCT GLUCOSE: 115 MG/DL (ref 70–110)
POCT GLUCOSE: 152 MG/DL (ref 70–110)
POCT GLUCOSE: 176 MG/DL (ref 70–110)
POTASSIUM BLD-SCNC: 4.1 MMOL/L (ref 3.5–5.1)
POTASSIUM SERPL-SCNC: 4 MMOL/L (ref 3.5–5.1)
POTASSIUM SERPL-SCNC: 4.2 MMOL/L (ref 3.5–5.1)
POTASSIUM SERPL-SCNC: 4.3 MMOL/L (ref 3.5–5.1)
POTASSIUM SERPL-SCNC: 4.3 MMOL/L (ref 3.5–5.1)
PREALB SERPL-MCNC: 10 MG/DL (ref 20–43)
PROT SERPL-MCNC: 6.1 G/DL (ref 6–8.4)
PROTHROMBIN TIME: 18.6 SEC (ref 9–12.5)
RBC # BLD AUTO: 2.31 M/UL (ref 4.6–6.2)
SAMPLE: ABNORMAL
SAMPLE: ABNORMAL
SITE: ABNORMAL
SITE: ABNORMAL
SODIUM BLD-SCNC: 136 MMOL/L (ref 136–145)
SODIUM SERPL-SCNC: 135 MMOL/L (ref 136–145)
SODIUM SERPL-SCNC: 137 MMOL/L (ref 136–145)
SODIUM SERPL-SCNC: 137 MMOL/L (ref 136–145)
WBC # BLD AUTO: 20.11 K/UL (ref 3.9–12.7)

## 2023-12-16 PROCEDURE — 36430 TRANSFUSION BLD/BLD COMPNT: CPT

## 2023-12-16 PROCEDURE — 25000242 PHARM REV CODE 250 ALT 637 W/ HCPCS: Performed by: NURSE PRACTITIONER

## 2023-12-16 PROCEDURE — P9016 RBC LEUKOCYTES REDUCED: HCPCS | Performed by: STUDENT IN AN ORGANIZED HEALTH CARE EDUCATION/TRAINING PROGRAM

## 2023-12-16 PROCEDURE — 83615 LACTATE (LD) (LDH) ENZYME: CPT | Performed by: STUDENT IN AN ORGANIZED HEALTH CARE EDUCATION/TRAINING PROGRAM

## 2023-12-16 PROCEDURE — 99232 SBSQ HOSP IP/OBS MODERATE 35: CPT | Mod: ,,, | Performed by: NURSE PRACTITIONER

## 2023-12-16 PROCEDURE — 25000003 PHARM REV CODE 250: Performed by: STUDENT IN AN ORGANIZED HEALTH CARE EDUCATION/TRAINING PROGRAM

## 2023-12-16 PROCEDURE — 90945 DIALYSIS ONE EVALUATION: CPT

## 2023-12-16 PROCEDURE — 85730 THROMBOPLASTIN TIME PARTIAL: CPT | Performed by: PHYSICIAN ASSISTANT

## 2023-12-16 PROCEDURE — 25000003 PHARM REV CODE 250

## 2023-12-16 PROCEDURE — 25000003 PHARM REV CODE 250: Performed by: THORACIC SURGERY (CARDIOTHORACIC VASCULAR SURGERY)

## 2023-12-16 PROCEDURE — 63600175 PHARM REV CODE 636 W HCPCS: Performed by: NURSE PRACTITIONER

## 2023-12-16 PROCEDURE — 27000248 HC VAD-ADDITIONAL DAY

## 2023-12-16 PROCEDURE — 90945 DIALYSIS ONE EVALUATION: CPT | Mod: ,,, | Performed by: INTERNAL MEDICINE

## 2023-12-16 PROCEDURE — 99900035 HC TECH TIME PER 15 MIN (STAT)

## 2023-12-16 PROCEDURE — C9113 INJ PANTOPRAZOLE SODIUM, VIA: HCPCS | Performed by: PHYSICIAN ASSISTANT

## 2023-12-16 PROCEDURE — 82330 ASSAY OF CALCIUM: CPT | Mod: 91 | Performed by: STUDENT IN AN ORGANIZED HEALTH CARE EDUCATION/TRAINING PROGRAM

## 2023-12-16 PROCEDURE — 85025 COMPLETE CBC W/AUTO DIFF WBC: CPT | Performed by: THORACIC SURGERY (CARDIOTHORACIC VASCULAR SURGERY)

## 2023-12-16 PROCEDURE — 97110 THERAPEUTIC EXERCISES: CPT

## 2023-12-16 PROCEDURE — 25000003 PHARM REV CODE 250: Performed by: PHYSICIAN ASSISTANT

## 2023-12-16 PROCEDURE — 99291 CRITICAL CARE FIRST HOUR: CPT | Mod: ,,, | Performed by: INTERNAL MEDICINE

## 2023-12-16 PROCEDURE — 20000000 HC ICU ROOM

## 2023-12-16 PROCEDURE — 80069 RENAL FUNCTION PANEL: CPT | Performed by: STUDENT IN AN ORGANIZED HEALTH CARE EDUCATION/TRAINING PROGRAM

## 2023-12-16 PROCEDURE — 80048 BASIC METABOLIC PNL TOTAL CA: CPT | Performed by: PHYSICIAN ASSISTANT

## 2023-12-16 PROCEDURE — 25000003 PHARM REV CODE 250: Performed by: INTERNAL MEDICINE

## 2023-12-16 PROCEDURE — 63600175 PHARM REV CODE 636 W HCPCS: Performed by: PHYSICIAN ASSISTANT

## 2023-12-16 PROCEDURE — 84075 ASSAY ALKALINE PHOSPHATASE: CPT | Performed by: STUDENT IN AN ORGANIZED HEALTH CARE EDUCATION/TRAINING PROGRAM

## 2023-12-16 PROCEDURE — 94761 N-INVAS EAR/PLS OXIMETRY MLT: CPT

## 2023-12-16 PROCEDURE — 97535 SELF CARE MNGMENT TRAINING: CPT

## 2023-12-16 PROCEDURE — 86920 COMPATIBILITY TEST SPIN: CPT | Performed by: STUDENT IN AN ORGANIZED HEALTH CARE EDUCATION/TRAINING PROGRAM

## 2023-12-16 PROCEDURE — 63600175 PHARM REV CODE 636 W HCPCS: Performed by: STUDENT IN AN ORGANIZED HEALTH CARE EDUCATION/TRAINING PROGRAM

## 2023-12-16 PROCEDURE — 80100008 HC CRRT DAILY MAINTENANCE

## 2023-12-16 PROCEDURE — 83735 ASSAY OF MAGNESIUM: CPT | Mod: 91 | Performed by: STUDENT IN AN ORGANIZED HEALTH CARE EDUCATION/TRAINING PROGRAM

## 2023-12-16 PROCEDURE — 85610 PROTHROMBIN TIME: CPT | Performed by: PHYSICIAN ASSISTANT

## 2023-12-16 PROCEDURE — 97530 THERAPEUTIC ACTIVITIES: CPT

## 2023-12-16 PROCEDURE — 93750 INTERROGATION VAD IN PERSON: CPT | Mod: ,,, | Performed by: INTERNAL MEDICINE

## 2023-12-16 RX ORDER — LIDOCAINE HYDROCHLORIDE 10 MG/ML
10 INJECTION, SOLUTION EPIDURAL; INFILTRATION; INTRACAUDAL; PERINEURAL ONCE
Status: COMPLETED | OUTPATIENT
Start: 2023-12-16 | End: 2023-12-16

## 2023-12-16 RX ORDER — GABAPENTIN 300 MG/1
300 CAPSULE ORAL ONCE
Status: DISCONTINUED | OUTPATIENT
Start: 2023-12-16 | End: 2023-12-16

## 2023-12-16 RX ORDER — CALCIUM GLUCONATE 20 MG/ML
2 INJECTION, SOLUTION INTRAVENOUS
Status: DISCONTINUED | OUTPATIENT
Start: 2023-12-16 | End: 2023-12-22

## 2023-12-16 RX ORDER — POTASSIUM CHLORIDE 14.9 MG/ML
60 INJECTION INTRAVENOUS
Status: DISCONTINUED | OUTPATIENT
Start: 2023-12-16 | End: 2023-12-17

## 2023-12-16 RX ORDER — MAGNESIUM SULFATE HEPTAHYDRATE 40 MG/ML
2 INJECTION, SOLUTION INTRAVENOUS
Status: DISCONTINUED | OUTPATIENT
Start: 2023-12-16 | End: 2023-12-17

## 2023-12-16 RX ORDER — CALCIUM GLUCONATE 20 MG/ML
1 INJECTION, SOLUTION INTRAVENOUS
Status: DISCONTINUED | OUTPATIENT
Start: 2023-12-16 | End: 2023-12-22

## 2023-12-16 RX ORDER — GABAPENTIN 100 MG/1
100 CAPSULE ORAL ONCE
Status: COMPLETED | OUTPATIENT
Start: 2023-12-16 | End: 2023-12-16

## 2023-12-16 RX ORDER — HYDROCODONE BITARTRATE AND ACETAMINOPHEN 500; 5 MG/1; MG/1
TABLET ORAL
Status: DISCONTINUED | OUTPATIENT
Start: 2023-12-16 | End: 2023-12-17

## 2023-12-16 RX ORDER — CALCIUM GLUCONATE 20 MG/ML
3 INJECTION, SOLUTION INTRAVENOUS
Status: DISCONTINUED | OUTPATIENT
Start: 2023-12-16 | End: 2023-12-22

## 2023-12-16 RX ORDER — MAGNESIUM SULFATE HEPTAHYDRATE 40 MG/ML
4 INJECTION, SOLUTION INTRAVENOUS
Status: DISCONTINUED | OUTPATIENT
Start: 2023-12-16 | End: 2023-12-17

## 2023-12-16 RX ORDER — POTASSIUM CHLORIDE 29.8 MG/ML
80 INJECTION INTRAVENOUS
Status: DISCONTINUED | OUTPATIENT
Start: 2023-12-16 | End: 2023-12-17

## 2023-12-16 RX ORDER — POTASSIUM CHLORIDE 29.8 MG/ML
40 INJECTION INTRAVENOUS
Status: DISCONTINUED | OUTPATIENT
Start: 2023-12-16 | End: 2023-12-22

## 2023-12-16 RX ORDER — GABAPENTIN 400 MG/1
800 CAPSULE ORAL ONCE
Status: DISCONTINUED | OUTPATIENT
Start: 2023-12-16 | End: 2023-12-16

## 2023-12-16 RX ADMIN — ACETAMINOPHEN 650 MG: 325 TABLET ORAL at 12:12

## 2023-12-16 RX ADMIN — INSULIN ASPART 9 UNITS: 100 INJECTION, SOLUTION INTRAVENOUS; SUBCUTANEOUS at 08:12

## 2023-12-16 RX ADMIN — PSYLLIUM HUSK 1 PACKET: 3.4 POWDER ORAL at 08:12

## 2023-12-16 RX ADMIN — WARFARIN SODIUM 4 MG: 4 TABLET ORAL at 04:12

## 2023-12-16 RX ADMIN — MAGNESIUM SULFATE HEPTAHYDRATE 2 G: 40 INJECTION, SOLUTION INTRAVENOUS at 07:12

## 2023-12-16 RX ADMIN — ATORVASTATIN CALCIUM 40 MG: 10 TABLET, FILM COATED ORAL at 08:12

## 2023-12-16 RX ADMIN — INSULIN ASPART 2 UNITS: 100 INJECTION, SOLUTION INTRAVENOUS; SUBCUTANEOUS at 08:12

## 2023-12-16 RX ADMIN — MIRTAZAPINE 15 MG: 15 TABLET, FILM COATED ORAL at 09:12

## 2023-12-16 RX ADMIN — INSULIN ASPART 2 UNITS: 100 INJECTION, SOLUTION INTRAVENOUS; SUBCUTANEOUS at 04:12

## 2023-12-16 RX ADMIN — INSULIN ASPART 9 UNITS: 100 INJECTION, SOLUTION INTRAVENOUS; SUBCUTANEOUS at 04:12

## 2023-12-16 RX ADMIN — MIDODRINE HYDROCHLORIDE 15 MG: 5 TABLET ORAL at 08:12

## 2023-12-16 RX ADMIN — QUETIAPINE FUMARATE 50 MG: 25 TABLET ORAL at 08:12

## 2023-12-16 RX ADMIN — SODIUM PHOSPHATE, MONOBASIC, MONOHYDRATE AND SODIUM PHOSPHATE, DIBASIC, ANHYDROUS 30 MMOL: 142; 276 INJECTION, SOLUTION INTRAVENOUS at 04:12

## 2023-12-16 RX ADMIN — POLYETHYLENE GLYCOL 3350 17 G: 17 POWDER, FOR SOLUTION ORAL at 09:12

## 2023-12-16 RX ADMIN — PANTOPRAZOLE SODIUM 40 MG: 40 INJECTION, POWDER, FOR SOLUTION INTRAVENOUS at 08:12

## 2023-12-16 RX ADMIN — INSULIN ASPART 9 UNITS: 100 INJECTION, SOLUTION INTRAVENOUS; SUBCUTANEOUS at 11:12

## 2023-12-16 RX ADMIN — SODIUM PHOSPHATE, MONOBASIC, MONOHYDRATE AND SODIUM PHOSPHATE, DIBASIC, ANHYDROUS 39.99 MMOL: 142; 276 INJECTION, SOLUTION INTRAVENOUS at 12:12

## 2023-12-16 RX ADMIN — GABAPENTIN 100 MG: 100 CAPSULE ORAL at 09:12

## 2023-12-16 RX ADMIN — INSULIN ASPART 9 UNITS: 100 INJECTION, SOLUTION INTRAVENOUS; SUBCUTANEOUS at 05:12

## 2023-12-16 RX ADMIN — LIDOCAINE HYDROCHLORIDE 100 MG: 10 INJECTION, SOLUTION EPIDURAL; INFILTRATION; INTRACAUDAL at 03:12

## 2023-12-16 RX ADMIN — MIDODRINE HYDROCHLORIDE 15 MG: 5 TABLET ORAL at 04:12

## 2023-12-16 RX ADMIN — SODIUM PHOSPHATE, MONOBASIC, MONOHYDRATE AND SODIUM PHOSPHATE, DIBASIC, ANHYDROUS 30 MMOL: 142; 276 INJECTION, SOLUTION INTRAVENOUS at 11:12

## 2023-12-16 RX ADMIN — SENNOSIDES AND DOCUSATE SODIUM 1 TABLET: 8.6; 5 TABLET ORAL at 08:12

## 2023-12-16 RX ADMIN — QUETIAPINE FUMARATE 50 MG: 25 TABLET ORAL at 01:12

## 2023-12-16 RX ADMIN — Medication 6 MG: at 12:12

## 2023-12-16 RX ADMIN — AMIODARONE HYDROCHLORIDE 400 MG: 200 TABLET ORAL at 08:12

## 2023-12-16 NOTE — ASSESSMENT & PLAN NOTE
Patient is identified as having Combined Systolic and Diastolic heart failure that is Acute on chronic. CHF is currently uncontrolled due to JVD, Rales/crackles on pulmonary exam, and Pulmonary edema/pleural effusion on CXR. Latest ECHO performed and demonstrates- Results for orders placed during the hospital encounter of 11/09/23    Echo    Interpretation Summary    Left Ventricle: The left ventricle is moderately dilated. Mildly increased ventricular mass. Normal wall thickness. There is eccentric hypertrophy. Severe global hyperkinesis present. There is severely reduced systolic function with a visually estimated ejection fraction of 10 -15%. Grade III diastolic dysfunction.    Right Ventricle: Normal right ventricular cavity size. Systolic function is normal. Pacemaker lead present in the ventricle.    Left Atrium: Left atrium is severely dilated.    Mitral Valve: There is annular and bileaflet tethering. There is moderate regurgitation with a centrally directed jet.    Tricuspid Valve: There is mild regurgitation with a centrally directed jet.    Pulmonary Artery: The estimated pulmonary artery systolic pressure is 41 mmHg.    IVC/SVC: Normal venous pressure at 3 mmHg.    last BNP reviewed- and noted below   Recent Labs   Lab 12/15/23  0311   BNP 1,312*       - management per primary team  - LVAD for mechanical support

## 2023-12-16 NOTE — SUBJECTIVE & OBJECTIVE
"Interval HPI:   Overnight events: No acute events overnight. Patient in room 37024/57365 A. Blood glucose stable. BG at and above goal on current insulin regimen (SSI ). Steroid use- None. 12 Days Post-Op  Renal function- Normal   Vasopressors-  Epinephrine       Endocrine will continue to follow and manage insulin orders inpatient.         Diet NPO Except for: Medication (per NG tube)     Eating:   NPO  Nausea: No  Hypoglycemia and intervention: No  Fever: No  TPN and/or TF: Yes  If yes, type of TF/TPN and rate: TF @ 40 ml/hr    BP (!) 70/0   Pulse 93   Temp 98 °F (36.7 °C) (Oral)   Resp (!) 29   Ht 6' (1.829 m)   Wt 88 kg (194 lb)   SpO2 (!) 90%   BMI 26.31 kg/m²     Labs Reviewed and Include    Recent Labs   Lab 12/16/23  0409   *  157*   CALCIUM 8.5*  8.5*   ALBUMIN 1.8*  1.8*   PROT 6.1   *  135*   K 4.0  4.0   CO2 22*  22*     104   BUN 9  9   CREATININE 0.7  0.7   ALKPHOS 230*   ALT 23   AST 91*   BILITOT 1.3*     Lab Results   Component Value Date    WBC 20.11 (H) 12/16/2023    HGB 7.1 (L) 12/16/2023    HCT 21.2 (L) 12/16/2023    MCV 92 12/16/2023     12/16/2023     No results for input(s): "TSH", "FREET4" in the last 168 hours.  Lab Results   Component Value Date    HGBA1C 7.6 (H) 10/12/2023       Nutritional status:   Body mass index is 26.31 kg/m².  Lab Results   Component Value Date    ALBUMIN 1.8 (L) 12/16/2023    ALBUMIN 1.8 (L) 12/16/2023    ALBUMIN 1.9 (L) 12/15/2023     Lab Results   Component Value Date    PREALBUMIN 10 (L) 12/15/2023    PREALBUMIN 12 (L) 12/09/2023    PREALBUMIN 19 (L) 12/01/2023       Estimated Creatinine Clearance: 121.6 mL/min (based on SCr of 0.7 mg/dL).    Accu-Checks  Recent Labs     12/15/23  0001 12/15/23  0405 12/15/23  0814 12/15/23  1100 12/15/23  1230 12/15/23  1708 12/15/23  1945 12/15/23  2326 12/16/23  0408 12/16/23  0806   POCTGLUCOSE 126* 164* 228* 231* 239* 141* 143* 127* 176* 152*       Current Medications and/or " Treatments Impacting Glycemic Control  Immunotherapy:    Immunosuppressants       None          Steroids:   Hormones (From admission, onward)      Start     Stop Route Frequency Ordered    12/13/23 2200  melatonin tablet 6 mg         -- Oral Nightly PRN 12/13/23 2100          Pressors:    Autonomic Drugs (From admission, onward)      Start     Stop Route Frequency Ordered    12/10/23 1045  EPINEPHrine 5 mg in dextrose 5% 250 mL infusion (premix)         -- IV Continuous 12/10/23 0943    12/09/23 2100  midodrine tablet 15 mg         -- Oral 3 times daily 12/09/23 1839          Hyperglycemia/Diabetes Medications:   Antihyperglycemics (From admission, onward)      Start     Stop Route Frequency Ordered    12/15/23 1200  insulin aspart U-100 pen 9 Units         -- SubQ 6 times per day 12/15/23 0840    12/11/23 1027  insulin aspart U-100 pen 0-10 Units         -- SubQ Every 4 hours PRN 12/11/23 0973

## 2023-12-16 NOTE — PT/OT/SLP PROGRESS
Occupational Therapy   Treatment    Co-treatment with PT due to pt's requiring 2 skilled therapists to safely perform mobility    Name: Radha Abbott  MRN: [id number]  Admitting Diagnosis:  LVAD (left ventricular assist device) present  12 Days Post-Op    Recommendations:     Discharge Recommendations: High Intensity Therapy  Discharge Equipment Recommendations:  to be determined by next level of care  Barriers to discharge:  Other (Comment) (increased assistance required with ADLs and mobility)    Assessment:     Radha Abbott is a 63 y.o. male with a medical diagnosis of LVAD (left ventricular assist device) present.  Pt demonstrated good participation but had limited tolerance this date due to dizziness and hypotension.  He continues to require significant assistance with ADLs and mobility.   He presents with the following.  Performance deficits affecting function are weakness, impaired endurance, impaired self care skills, impaired functional mobility, gait instability, impaired balance, pain, decreased upper extremity function, impaired fine motor, impaired cardiopulmonary response to activity, impaired coordination, decreased safety awareness (sternal precautions).     Rehab Prognosis:  Good; patient would benefit from acute skilled OT services to address these deficits and reach maximum level of function.       Plan:     Patient to be seen 6 x/week to address the above listed problems via self-care/home management, therapeutic activities, therapeutic exercises, neuromuscular re-education  Plan of Care Expires: 01/02/24  Plan of Care Reviewed with: patient, spouse    Subjective     Chief Complaint: dizziness and abdominal pain  Patient/Family Comments/goals: Pt was agreeable to therapy.  Pain/Comfort:  Pain Rating 1: 6/10  Location - Orientation 1: generalized  Location 1: abdomen  Pain Addressed 1: Reposition, Distraction  Pain Rating Post-Intervention 1: 6/10    Objective:     Communicated with: nurse and PT prior  to session.  Patient found HOB elevated with telemetry, blood pressure cuff, pulse ox (continuous), PICC line, central line, NG tube, arterial line, LVAD, Trialysis with nurse and his wife present upon OT entry to room.    General Precautions: Standard, fall, sternal, LVAD    Orthopedic Precautions:N/A  Braces: N/A  Respiratory Status: Room air     Occupational Performance:     Bed Mobility:    Patient completed Supine to Sit with maximal assistance and 2 persons to the L  Pt scooted to EOB to place B feet on the floor with total assistance of 2  Patient completed Sit to Supine with maximal assistance and 2 persons   Pt scooted to HOB while supine with total assistance of 2     Functional Mobility/Transfers:  Deferred due to pt's dizziness, lethargy, and hypotension.  He sat EOB ~15 minutes with CGA-Max A to correct L lateral and posterior lean.  Once his dizziness increased and he became more lethargic, he was returned to supine.     Activities of Daily Living:  Grooming: Min-Mod A for sitting balance while pt washed his face with a wash cloth EOB.  He attempted to manipulate toothpaste tube cap in preparation for oral care, but he was unable to perform.  Activity terminated due to pt's increased dizziness and lethargy.   Lower Body Dressing: total assistance to jasson non-skid socks while supine      [facility] 6 Click ADL: 11    Treatment & Education:  Pt and his wife edu on role of OT, POC, his sternal precautions, safety when performing self care tasks, benefit of performing OOB activity, and safety when performing functional transfers and mobility.    - Self care tasks completed-- as noted above      Patient left HOB elevated with all lines intact, call button in reach, nurse notified, and his wife and nurse present    GOALS:   Multidisciplinary Problems       Occupational Therapy Goals          Problem: Occupational Therapy    Goal Priority Disciplines Outcome Interventions   Occupational Therapy Goal     OT, PT/OT  Ongoing, Progressing    Description: Goals to be met by: 1/2/24     Patient will increase functional independence with ADLs by performing:    UB Dressing with SBA  LE Dressing with Supervision.  Grooming while standing at sink with Supervision.  Toileting from bedside commode with SBA for hygiene and clothing management.   Step transfer to bedside commode with SBA  [place] with VAD management during ADLs                             Time Tracking:     OT Date of Treatment: 12/16/23  OT Start Time: 1011  OT Stop Time: 1042  OT Total Time (min): 31 min    Billable Minutes:Self Care/Home Management 11 min  Therapeutic Activity 20 min    OT/PRIETO: OT          12/16/2023

## 2023-12-16 NOTE — PROGRESS NOTES
12/16/2023  Deni Frye    Current provider:  Sandhya Price MD    Device interrogation:      12/16/2023     6:00 AM 12/16/2023     5:00 AM 12/16/2023     4:00 AM 12/16/2023     3:00 AM 12/16/2023     2:00 AM 12/16/2023     1:00 AM 12/16/2023    12:00 AM   TXP LVAD INTERROGATIONS   Type HeartMate3 HeartMate3 HeartMate3 HeartMate3 HeartMate3 HeartMate3 HeartMate3   Flow 4.2 4.2 4.2 4.2 4.2 4.2 4.1   Speed 4900 4900 490 4900 4900 4900 4900   PI 3.2 3.2 3.4 3.6 3.9 3.4 3.7   Power ([place]) 3.5 3.5 3.3 3.3 3.2 3.2 3.3   LSL 4500 4500 4500 4500 4500 4500 4500   Pulsatility Intermittent pulse Intermittent pulse Intermittent pulse Intermittent pulse Intermittent pulse Intermittent pulse Intermittent pulse          Rounded on [facility] to ensure all mechanical assist device settings (IABP or VAD) were appropriate and all parameters were within limits.  I was able to ensure all back up equipment was present, the staff had no issues, and the Perfusion Department daily rounding was complete.      For implantable VADs: Interrogation of Ventricular assist device was performed with analysis of device parameters and review of device function. I have personally reviewed the interrogation findings and agree with findings as stated.     In emergency, the nursing units have been notified to contact the perfusion department either by:  Calling [phone] from 630am to 4pm Mon thru Fri, utilizing the On-Call Finder functionality of Epic and searching for Perfusion, or by contacting the hospital  from 4pm to 630am and on weekends and asking to speak with the perfusionist on call.    7:12 AM

## 2023-12-16 NOTE — PT/OT/SLP PROGRESS
Physical Therapy Treatment    Patient Name:  Radha Abbott   MRN:  [id number]  Admit Date: 11/9/2023  Admitting Diagnosis:  LVAD (left ventricular assist device) present   Length of Stay: 37 days  Recent Surgery: Procedure(s) (LRB):  CLOSURE, WOUND, STERNUM (N/A)  INSERTION, GRAFT, PERICARDIUM  DRAINAGE, PLEURAL EFFUSION 12 Days Post-Op    Recommendations:     Discharge Recommendations:  High Intensity Therapy   Discharge Equipment Recommendations: to be determined by next level of care       Plan:     During this hospitalization, patient to be seen 6 x/week to address the listed problems via gait training, therapeutic activities, therapeutic exercises, neuromuscular re-education  Plan of Care Expires:  01/04/24  Plan of Care Reviewed with: patient, spouse    Assessment:     Radha Abbott is a 63 y.o. male admitted with a medical diagnosis of LVAD (left ventricular assist device) present. Pt on CRRT through R IJ access and not on pressor support. Pt was able to perform bed mobility and work on sitting balance. After ~15 minutes of the sitting trial, pt was beginning to fatigue and have mild-moderate dizziness associated with hypotension. Pt returned to supine to rest, and BP return to baseline. No incidents occurred. Will continue to work on sitting balance, standing, and initiating gait.  Pt would benefit from continued acute PT to maximize functional mobility after surgical intervention. Patient has demonstrated sufficient progression to warrant High Intensity Therapy.        Problem List: weakness, impaired endurance, impaired functional mobility, impaired self care skills, impaired balance, pain, impaired coordination, impaired cardiopulmonary response to activity, impaired fine motor.  Rehab Prognosis: Good     GOALS:   Multidisciplinary Problems       Physical Therapy Goals          Problem: Physical Therapy    Goal Priority Disciplines Outcome Goal Variances Interventions   Physical Therapy Goal     PT, PT/OT  Ongoing, Progressing     Description: Goals to be met by: 23     Patient will increase functional independence with mobility by performin. Sit to stand transfer with modified independence  2. Bed to chair transfer with supervision  3. Gait  x 150 feet with supervision using physician approved AD with standing erst breaks prn.   4. Lower extremity exercise program x30 reps per handout, with independence  5. Recite 3/3 sternal precautions and remain complaint with precautions throughout session with no verbal cues                     Multidisciplinary Problems (Resolved)          Problem: Physical Therapy    Goal Priority Disciplines Outcome Goal Variances Interventions   Physical Therapy Goal   (Resolved)     PT, PT/OT Met     Description: Goals to be met by: 23     Patient will increase functional independence with mobility by performin. Evaluate pt's need for physical therapy.                          Subjective   Communicated with RN prior to session.  Patient found HOB elevated upon PT entry to room, agreeable to evaluation. Radha Abbott's wife present during session.    Chief Complaint: debility   Patient/Family Comments/goals: to get better  Pain/Comfort:  Pain Rating 1: 6/10  Location 1:  (abdominal)  Pain Addressed 1: Reposition, Distraction  Pain Rating Post-Intervention 1: 610    Objective:   Patient found with: telemetry, pulse ox (continuous), blood pressure cuff, PICC line, central line, NG tube, arterial line, LVAD   General Precautions: Standard, Cardiac fall, LVAD, sternal   Orthopedic Precautions:N/A   Braces: N/A   Oxygen Device: Room Air  Vitals: BP (!) 72/0 (BP Location: Left arm, Patient Position: Lying)   Pulse 94   Temp 97.8 °F (36.6 °C) (Oral)   Resp 18   Ht 6' (1.829 m)   Wt 88 kg (194 lb)   SpO2 (!) 92%   BMI 26.31 kg/m²     Outcome Measures:  AM-PAC 6 CLICK MOBILITY  Turning over in bed (including adjusting bedclothes, sheets and blankets)?: 2  Sitting down  on and standing up from a chair with arms (e.g., wheelchair, bedside commode, etc.): 1  Moving from lying on back to sitting on the side of the bed?: 2  Moving to and from a bed to a chair (including a wheelchair)?: 1  Need to walk in hospital room?: 1  Climbing 3-5 steps with a railing?: 1  Basic Mobility Total Score: 8         Functional Mobility:  Additional staff present: OT - due to pt requiring 2 skilled therapists to safely perform functional mobility and to accommodate pt's activity tolerance    Bed Mobility:   Supine to Sit: maximal assistance and 2 persons; HOB elevated  Scooting anteriorly to EOB to have both feet planted on floor: total assistance and 2 persons  Sit to Supine: maximal assistance and 2 persons; HOB flat    Sitting Balance at Edge of Bed:  Assistance Level Required: CGA progressing to max A with fatigue and hypotension   Time: 15 minute  Comments:   Worked on activity tolerance sitting EOB, worked on tolerance to positional change, and worked on sitting balance   Worked extensively on sitting balance re: maintaining midline and correcting to midline   With fatigue, pt had worsening R lateral lean   Worked on ADLs with OT      Transfers:   Not performed 2nd to dizziness and hypotension       Gait:  Not performed    Therapeutic Activities, Exercises, and Education:   Educated pt on PT role/POC  Pt verbalized understanding     Therapeutic Exercise  Mobility for generalized strengthening  B UE arm raises with AAROM x 10 reps   Initiation and use of core and postural muscles during sitting trial             Patient left HOB elevated with all lines intact, call button in reach, RN notified, and wife, charge RN, and dialysis tech present.     Time Tracking:     PT Received On: 12/16/23  PT Start Time: 1012     PT Stop Time: 1042  PT Total Time (min): 30 min       Billable Minutes:   Therapeutic Exercise 23    Treatment Type: Treatment  PT/PTA: PT

## 2023-12-16 NOTE — CARE UPDATE
HTS Hemodynamics:   Parameter   at 2000   CVP 6   SvO2 40   CO  5.3   CI 2.7      MAP 66   Hgb 6.5    Current Heart Failure Medications:  - Midodrine 15mg TID  - Lasix 240mg IV, Diuril 1g, Diamox 500mg 2pm 12/14    Current Mechanical Circulatory Support:  LVAD HM3 4900 RPM, on heparin gtt    I/Os: No UOP day shift.    Intake/Output Summary (Last 24 hours) at 12/15/2023 1809  Last data filed at 12/15/2023 1700  Gross per 24 hour   Intake 2142.93 ml   Output 178 ml   Net 1964.93 ml     Assessment/Plan:  - Plan was discussed with on-call attending  - Diuretic challenge today with no UOP  - Restarted on CRRT this afternoon, giving pRBC 1 unit    Reginald Garcia MD  Cardiovascular Disease PGY-V  [facility]

## 2023-12-16 NOTE — ASSESSMENT & PLAN NOTE
Lab Results   Component Value Date    CREATININE 0.7 12/16/2023    CREATININE 0.7 12/16/2023     Avoid insulin stacking  Titrate insulin slowly

## 2023-12-16 NOTE — PLAN OF CARE
Pt follows commands, moves all extremities. HR 90s. MAP 70-80. CVP 6, 8 and 8. Pt on room air, O2 >90%. Afebrile  Heparin gtt @ 7 u/kg/h, last 2 PTTs therapeutic. Daily PTTs.  Tube feeds at 40cc/h per goal.   SLED CRRT -400, currently 250.   R HM 3, speed 4900, LSL 4500, Flow 4-4.2, PI 3.2-3.9, Power 3.2-3.5. No alarms noted. Dressing CDI.  SvO2 40%, Dr. Garcia notified.   Q8h renal panels, Q4h accuchecks. Lytes replaced per order.  VSS at this time. Will continue to monitor. See flowsheet for full assessment details.

## 2023-12-16 NOTE — PROGRESS NOTES
12/16/23 0116   Treatment   Treatment Type SLED   Treatment Status Daily equipment check   Dialysis Machine Number [id number]   Dialyzer Time (hours) 11.1   BVP (Liters) 94.7 L   Solutions Labeled and Current  Yes   Access Temporary Cath;Right;IJ   Catheter Dressing Intact  Yes   Alarms Engaged Yes   CRRT Comments daily check done   $ CRRT Charges   $ CRRT Daily Assessment Complete   $ CRRT Daily Maintenance Complete     Ongoing continuous SLED. Orders and machine settings verified. Daily equipment check and maintenance done.   (1) assistive equipment

## 2023-12-16 NOTE — ASSESSMENT & PLAN NOTE
- suspect acute tubular injury secondary to hypotension/cardiogenic shock likely compounded by intra-arterial contrast and anemia with urinary sediment with granular casts  - failed diuretic trial yesterday, anuric for which he was started on SLED with citrate for metabolic clearance and volume management, current   - Will continue with SLED with -400 as tolerated  - Q8H RFP, magnesium and ionized calcium per SLED protocol with citrate  - renal diet/tube feeds when not NPO, volume restriction per primary team  - strict I/O's and daily weights  - keep MAP > 65 mmHg  - renally all dose medications to eGFR  - avoid nephrotoxic agents wean feasible (i.e. NSAIDs, intra-arterial contrast, supra-therapeutic vancomycin levels, etc.)

## 2023-12-16 NOTE — PROGRESS NOTES
[facility] Intensive [facility]  Nephrology  Progress Note    Patient Name: Radha Abbott  MRN: [id number]  Admission Date: 11/9/2023  Hospital Length of Stay: 37 days  Attending Provider: Sandhya Price MD   Primary Care Physician: Vasu Kong MD  Principal Problem:LVAD (left ventricular assist device) present    Subjective:     HPI: Mr. Abbott is a 63-year-old man with ischemic cardiomyopathy with most recent TTE showing EF 10 -15% and G3DD s/p [employer] ICM placement on chronic dobutamine infusion, CAD s/p x3 vessel CABG back in 2009, type 2 diabetes mellitus (most recent hemoglobin A1c 7.6%), hypertension, HLD, history of ventricular fibrillation for which he is on amiodarone and CKD IIIb (baseline creatinine ~2-2.2) who was initially admitted on 11/9 with heart failure exacerbation and hypervolemia for which he was managed with inotrope with dobutamine in addition to IV diuresis. He ultimately underwent IABP placed on 11/21 for mechanical hemodynamic support and subsequently underwent LVAD placement on 12/1. His renal function appears to be mostly stable with IVÁN and creatinine in mid 2s to 3s in addition he is still making urine with Lasix infusion however on 12/5 Nephrology consulted given concern for uremia with BUN 72.     Interval History:   No acute events overnight, on SLED, , with citrate since yesterday noon(12,30pm), BP on the lower side with latest value 77/65(range 66/50-93/75), received Midodrine 15mg TID via NG, no swelling in extremities, CVP this morning 9, on room air. No vasopressors      Review of patient's allergies indicates:  No Known Allergies  Current Facility-Administered Medications   Medication Frequency    0.9%  NaCl infusion (for blood administration) Q24H PRN    0.9%  NaCl infusion Continuous    acetaminophen tablet 650 mg Q6H PRN    albuterol sulfate nebulizer solution 2.5 mg Q4H PRN    amiodarone tablet 400 mg Daily    atorvastatin tablet 40 mg Daily    bisacodyL  suppository 10 mg Daily PRN    calcium gluconate 30 g in dextrose 5 % (D5W) 1,000 mL infusion Continuous    dextrose 10 % infusion Continuous PRN    dextrose 10% bolus 125 mL 125 mL PRN    dextrose 10% bolus 250 mL 250 mL PRN    dextrose-sod citrate-citric ac 2.45-2.2 gram- 800 mg/100 mL Soln Continuous    EPINEPHrine 5 mg in dextrose 5% 250 mL infusion (premix) Continuous    glucagon (human recombinant) injection 1 mg PRN    heparin 25,000 units in dextrose 5% 250 ml (100 units/mL) infusion MINIMAL INTENSITY nomogram - [facility] Continuous    insulin aspart U-100 pen 0-10 Units Q4H PRN    insulin aspart U-100 pen 9 Units 6 times per day    magnesium hydroxide 400 mg/5 ml suspension 2,400 mg Daily PRN    magnesium sulfate 2g in water 50mL IVPB (premix) PRN    melatonin tablet 6 mg Nightly PRN    midodrine tablet 15 mg TID    mirtazapine tablet 15 mg Nightly    pantoprazole injection 40 mg Daily    polyethylene glycol packet 17 g Daily    potassium chloride 20 mEq in 100 mL IVPB (FOR CENTRAL LINE ADMINISTRATION ONLY) PRN    potassium chloride 20 mEq in 100 mL IVPB (FOR CENTRAL LINE ADMINISTRATION ONLY) PRN    psyllium husk (aspartame) 3.4 gram packet 1 packet Daily    QUEtiapine tablet 50 mg Q6H PRN    senna-docusate 8.6-50 mg per tablet 1 tablet Daily    sodium chloride 0.9% flush 10 mL PRN    sodium phosphate 20.01 mmol in dextrose 5 % (D5W) 250 mL IVPB PRN    sodium phosphate 30 mmol in dextrose 5 % (D5W) 250 mL IVPB PRN    sodium phosphate 39.99 mmol in dextrose 5 % (D5W) 250 mL IVPB PRN    warfarin (COUMADIN) tablet 4 mg Daily       Objective:     Vital Signs (Most Recent):  Temp: 98 °F (36.7 °C) (12/16/23 0700)  Pulse: 91 (12/16/23 0900)  Resp: (!) 27 (12/16/23 0900)  BP: (!) 70/0 (12/16/23 0700)  SpO2: (!) 90 % (12/16/23 0700) Vital Signs (24h Range):  Temp:  [97.7 °F (36.5 °C)-98.3 °F (36.8 °C)] 98 °F (36.7 °C)  Pulse:  [] 91  Resp:  [20-63] 27  SpO2:  [90 %-99 %] 90 %  BP: (70-78)/(0) 70/0  Arterial Line BP:  (66-93)/(50-75) 78/62     Weight: 88 kg (194 lb) (12/16/23 0400)  Body mass index is 26.31 kg/m².  Body surface area is 2.11 meters squared.    I/O last 3 completed shifts:  In: 6282 [I.V.:1002.6; NG/GT:1660; IV Piggyback:3619.4]  Out: 3384 [Other:3384]     Physical Exam  Cardiovascular:      Rate and Rhythm: Normal rate.   Pulmonary:      Effort: Pulmonary effort is normal.   Musculoskeletal:         General: No swelling.   Skin:     General: Skin is warm.   Neurological:      General: No focal deficit present.      Mental Status: He is alert.          Significant Labs:  BMP:   Recent Labs   Lab 12/16/23  0409   *  157*   *  135*   K 4.0  4.0     104   CO2 22*  22*   BUN 9  9   CREATININE 0.7  0.7   CALCIUM 8.5*  8.5*   MG 1.8     CBC:   Recent Labs   Lab 12/16/23  0409   WBC 20.11*   RBC 2.31*   HGB 7.1*   HCT 21.2*      MCV 92   MCH 30.7   MCHC 33.5        Significant Imaging:  Labs: Reviewed  X-Ray: Reviewed  Assessment/Plan:     Cardiac/Vascular  * LVAD (left ventricular assist device) present  -    Acute on chronic combined systolic and diastolic CHF, NYHA class 4  Patient is identified as having Combined Systolic and Diastolic heart failure that is Acute on chronic. CHF is currently uncontrolled due to JVD, Rales/crackles on pulmonary exam, and Pulmonary edema/pleural effusion on CXR. Latest ECHO performed and demonstrates- Results for orders placed during the hospital encounter of 11/09/23    Echo    Interpretation Summary    Left Ventricle: The left ventricle is moderately dilated. Mildly increased ventricular mass. Normal wall thickness. There is eccentric hypertrophy. Severe global hyperkinesis present. There is severely reduced systolic function with a visually estimated ejection fraction of 10 -15%. Grade III diastolic dysfunction.    Right Ventricle: Normal right ventricular cavity size. Systolic function is normal. Pacemaker lead present in the ventricle.    Left  Atrium: Left atrium is severely dilated.    Mitral Valve: There is annular and bileaflet tethering. There is moderate regurgitation with a centrally directed jet.    Tricuspid Valve: There is mild regurgitation with a centrally directed jet.    Pulmonary Artery: The estimated pulmonary artery systolic pressure is 41 mmHg.    IVC/SVC: Normal venous pressure at 3 mmHg.    last BNP reviewed- and noted below   Recent Labs   Lab 12/15/23  0311   BNP 1,312*       - management per primary team  - LVAD for mechanical support    Renal/  Acute renal failure with acute tubular necrosis superimposed on stage 3b chronic kidney disease  - suspect acute tubular injury secondary to hypotension/cardiogenic shock likely compounded by intra-arterial contrast and anemia with urinary sediment with granular casts  - failed diuretic trial yesterday, anuric for which he was started on SLED with citrate for metabolic clearance and volume management, current   - Will continue with SLED with -400 as tolerated  - Q8H RFP, magnesium and ionized calcium per SLED protocol with citrate  - renal diet/tube feeds when not NPO, volume restriction per primary team  - strict I/O's and daily weights  - keep MAP > 65 mmHg  - renally all dose medications to eGFR  - avoid nephrotoxic agents wean feasible (i.e. NSAIDs, intra-arterial contrast, supra-therapeutic vancomycin levels, etc.)        Thank you for your consult. I will follow-up with patient. Please contact us if you have any additional questions.    Kamilla Angela MD  Nephrology  [facility]    ATTENDING PHYSICIAN ATTESTATION  I have personally verified the history and examined the patient. I thoroughly reviewed the demographic, clinical, laboratorial and imaging information available in medical records. I agree with the assessment and recommendations provided by the subspecialty resident who was under my supervision.     DIALYSIS (CRRT) NOTE  Patient evaluated  while undergoing Slow Low Efficiency Dialysis (SLED) indicated for IVÁN and hemodynamic instability. No complications, tolerating session with current UFR. Will continue current support.

## 2023-12-16 NOTE — PROGRESS NOTES
12/16/23 1650   Treatment   Treatment Type SLED   Treatment Status Discontinued treatment;Blood returned   Dialyzer Time (hours) 25.47   BVP (Liters) 217.8 L   CRRT Comments blood returned by primary rn   CRRT Hourly Documentation   Total UF (Hourly Cleared) (mL) 148     Blood returned by primary RN d/t loss of power and loss of water. MD aware.

## 2023-12-16 NOTE — PROGRESS NOTES
[facility]  Endocrinology  Progress Note    Admit Date: 11/9/2023     Reason for Consult: Management of T2DM, Hyperglycemia     Surgical Procedure and Date: n/a    Diabetes diagnosis year: 1998    Home Diabetes Medications:  Metformin (off since October)   Lab Results   Component Value Date    HGBA1C 7.6 (H) 10/12/2023       How often checking glucose at home?  Once daily in the AM    BG readings on regimen: 150-160s  Hypoglycemia on the regimen?  No  Missed doses on regimen?  n/a    Diabetes Complications include:     Hyperglycemia    Complicating diabetes co morbidities:   CAD s/p CABG, HTN, HLD      HPI:   Patient is a 63 y.o. male with a diagnosis of CAD s/p 3v CABG (unclear anatomy, 2009), ICM with a recent EF of 15-20% s/p ICD (medtronik 2009), DM2 (a1c 7.7), HTN, HLD, Vfib on amio who presents to the ED with CC of SOB. In the ED he was AF with stable VS on RA.  CBC showing chronic anemia.  CMP notable for acute on chronic CKD with baseline around 2.1 and Cr now 2.6.  BNP elevated.  Lactate neg.  CXR showing pulmonary edema. He was subsequently admitted to the CCU for diuresis.  Endocrinology consulted for management of T2DM.          Interval HPI:   Overnight events: No acute events overnight. Patient in room 58751/66634 A. Blood glucose stable. BG at and above goal on current insulin regimen (SSI ). Steroid use- None. 12 Days Post-Op  Renal function- Normal   Vasopressors-  Epinephrine       Endocrine will continue to follow and manage insulin orders inpatient.         Diet NPO Except for: Medication (per NG tube)     Eating:   NPO  Nausea: No  Hypoglycemia and intervention: No  Fever: No  TPN and/or TF: Yes  If yes, type of TF/TPN and rate: TF @ 40 ml/hr    BP (!) 70/0   Pulse 93   Temp 98 °F (36.7 °C) (Oral)   Resp (!) 29   Ht 6' (1.829 m)   Wt 88 kg (194 lb)   SpO2 (!) 90%   BMI 26.31 kg/m²     Labs Reviewed and Include    Recent Labs   Lab 12/16/23  0409   *  157*  "  CALCIUM 8.5*  8.5*   ALBUMIN 1.8*  1.8*   PROT 6.1   *  135*   K 4.0  4.0   CO2 22*  22*     104   BUN 9  9   CREATININE 0.7  0.7   ALKPHOS 230*   ALT 23   AST 91*   BILITOT 1.3*     Lab Results   Component Value Date    WBC 20.11 (H) 12/16/2023    HGB 7.1 (L) 12/16/2023    HCT 21.2 (L) 12/16/2023    MCV 92 12/16/2023     12/16/2023     No results for input(s): "TSH", "FREET4" in the last 168 hours.  Lab Results   Component Value Date    HGBA1C 7.6 (H) 10/12/2023       Nutritional status:   Body mass index is 26.31 kg/m².  Lab Results   Component Value Date    ALBUMIN 1.8 (L) 12/16/2023    ALBUMIN 1.8 (L) 12/16/2023    ALBUMIN 1.9 (L) 12/15/2023     Lab Results   Component Value Date    PREALBUMIN 10 (L) 12/15/2023    PREALBUMIN 12 (L) 12/09/2023    PREALBUMIN 19 (L) 12/01/2023       Estimated Creatinine Clearance: 121.6 mL/min (based on SCr of 0.7 mg/dL).    Accu-Checks  Recent Labs     12/15/23  0001 12/15/23  0405 12/15/23  0814 12/15/23  1100 12/15/23  1230 12/15/23  1708 12/15/23  1945 12/15/23  2326 12/16/23  0408 12/16/23  0806   POCTGLUCOSE 126* 164* 228* 231* 239* 141* 143* 127* 176* 152*       Current Medications and/or Treatments Impacting Glycemic Control  Immunotherapy:    Immunosuppressants       None          Steroids:   Hormones (From admission, onward)      Start     Stop Route Frequency Ordered    12/13/23 2200  melatonin tablet 6 mg         -- Oral Nightly PRN 12/13/23 2100          Pressors:    Autonomic Drugs (From admission, onward)      Start     Stop Route Frequency Ordered    12/10/23 1045  EPINEPHrine 5 mg in dextrose 5% 250 mL infusion (premix)         -- IV Continuous 12/10/23 0943    12/09/23 2100  midodrine tablet 15 mg         -- Oral 3 times daily 12/09/23 2417          Hyperglycemia/Diabetes Medications:   Antihyperglycemics (From admission, onward)      Start     Stop Route Frequency Ordered    12/15/23 1200  insulin aspart U-100 pen 9 Units         -- " SubQ 6 times per day 12/15/23 0840    12/11/23 1027  insulin aspart U-100 pen 0-10 Units         -- SubQ Every 4 hours PRN 12/11/23 0927            ASSESSMENT and PLAN    Cardiac/Vascular  * LVAD (left ventricular assist device) present  Optimize BG control to improve wound healing        PAF (paroxysmal atrial fibrillation)  May increase insulin resistance.         Acute on chronic combined systolic and diastolic CHF, NYHA class 4  Managed per primary team  Optimize BG control  S/p LVAD     Renal/  Acute renal failure with acute tubular necrosis superimposed on stage 3b chronic kidney disease  Lab Results   Component Value Date    CREATININE 0.7 12/16/2023    CREATININE 0.7 12/16/2023     Avoid insulin stacking  Titrate insulin slowly       Endocrine  Type 2 diabetes mellitus without complication, without long-term current use of insulin  BG goal 140-180.      - Novolog 9 units with scheduled TF. (Hold if TF is stopped or if BG < 100 mg/dL);   - BG checks q4hr while on TF/NPO   - Novolog (aspart) insulin prn for BG excursions [facility] (150/25)   - Hypoglycemia protocol in place    ** Please notify Endocrine for any change and/or advance in diet**  ** Please call Endocrine for any BG related issues **    Discharge Planning:   TBD. Please notify endocrinology prior to discharge.                 Erasmo Parrish, DNP, FNP  Endocrinology  [facility]

## 2023-12-16 NOTE — ASSESSMENT & PLAN NOTE
BG goal 140-180.      - Novolog 9 units with scheduled TF. (Hold if TF is stopped or if BG < 100 mg/dL);   - BG checks q4hr while on TF/NPO   - Novolog (aspart) insulin prn for BG excursions [facility] (150/25)   - Hypoglycemia protocol in place    ** Please notify Endocrine for any change and/or advance in diet**  ** Please call Endocrine for any BG related issues **    Discharge Planning:   TBD. Please notify endocrinology prior to discharge.

## 2023-12-16 NOTE — SUBJECTIVE & OBJECTIVE
Interval History:   No acute events overnight, on SLED, , with citrate since yesterday noon(12,30pm), BP on the lower side with latest value 77/65(range 66/50-93/75), received Midodrine 15mg TID via NG, no swelling in extremities, CVP this morning 9, on room air. No vasopressors      Review of patient's allergies indicates:  No Known Allergies  Current Facility-Administered Medications   Medication Frequency    0.9%  NaCl infusion (for blood administration) Q24H PRN    0.9%  NaCl infusion Continuous    acetaminophen tablet 650 mg Q6H PRN    albuterol sulfate nebulizer solution 2.5 mg Q4H PRN    amiodarone tablet 400 mg Daily    atorvastatin tablet 40 mg Daily    bisacodyL suppository 10 mg Daily PRN    calcium gluconate 30 g in dextrose 5 % (D5W) 1,000 mL infusion Continuous    dextrose 10 % infusion Continuous PRN    dextrose 10% bolus 125 mL 125 mL PRN    dextrose 10% bolus 250 mL 250 mL PRN    dextrose-sod citrate-citric ac 2.45-2.2 gram- 800 mg/100 mL Soln Continuous    EPINEPHrine 5 mg in dextrose 5% 250 mL infusion (premix) Continuous    glucagon (human recombinant) injection 1 mg PRN    heparin 25,000 units in dextrose 5% 250 ml (100 units/mL) infusion MINIMAL INTENSITY nomogram - [facility] Continuous    insulin aspart U-100 pen 0-10 Units Q4H PRN    insulin aspart U-100 pen 9 Units 6 times per day    magnesium hydroxide 400 mg/5 ml suspension 2,400 mg Daily PRN    magnesium sulfate 2g in water 50mL IVPB (premix) PRN    melatonin tablet 6 mg Nightly PRN    midodrine tablet 15 mg TID    mirtazapine tablet 15 mg Nightly    pantoprazole injection 40 mg Daily    polyethylene glycol packet 17 g Daily    potassium chloride 20 mEq in 100 mL IVPB (FOR CENTRAL LINE ADMINISTRATION ONLY) PRN    potassium chloride 20 mEq in 100 mL IVPB (FOR CENTRAL LINE ADMINISTRATION ONLY) PRN    psyllium husk (aspartame) 3.4 gram packet 1 packet Daily    QUEtiapine tablet 50 mg Q6H PRN    senna-docusate 8.6-50 mg per tablet 1 tablet  Daily    sodium chloride 0.9% flush 10 mL PRN    sodium phosphate 20.01 mmol in dextrose 5 % (D5W) 250 mL IVPB PRN    sodium phosphate 30 mmol in dextrose 5 % (D5W) 250 mL IVPB PRN    sodium phosphate 39.99 mmol in dextrose 5 % (D5W) 250 mL IVPB PRN    warfarin (COUMADIN) tablet 4 mg Daily       Objective:     Vital Signs (Most Recent):  Temp: 98 °F (36.7 °C) (12/16/23 0700)  Pulse: 91 (12/16/23 0900)  Resp: (!) 27 (12/16/23 0900)  BP: (!) 70/0 (12/16/23 0700)  SpO2: (!) 90 % (12/16/23 0700) Vital Signs (24h Range):  Temp:  [97.7 °F (36.5 °C)-98.3 °F (36.8 °C)] 98 °F (36.7 °C)  Pulse:  [] 91  Resp:  [20-63] 27  SpO2:  [90 %-99 %] 90 %  BP: (70-78)/(0) 70/0  Arterial Line BP: (66-93)/(50-75) 78/62     Weight: 88 kg (194 lb) (12/16/23 0400)  Body mass index is 26.31 kg/m².  Body surface area is 2.11 meters squared.    I/O last 3 completed shifts:  In: 6282 [I.V.:1002.6; NG/GT:1660; IV Piggyback:3619.4]  Out: 3384 [Other:3384]     Physical Exam  Cardiovascular:      Rate and Rhythm: Normal rate.   Pulmonary:      Effort: Pulmonary effort is normal.   Musculoskeletal:         General: No swelling.   Skin:     General: Skin is warm.   Neurological:      General: No focal deficit present.      Mental Status: He is alert.          Significant Labs:  BMP:   Recent Labs   Lab 12/16/23 0409   *  157*   *  135*   K 4.0  4.0     104   CO2 22*  22*   BUN 9  9   CREATININE 0.7  0.7   CALCIUM 8.5*  8.5*   MG 1.8     CBC:   Recent Labs   Lab 12/16/23 0409   WBC 20.11*   RBC 2.31*   HGB 7.1*   HCT 21.2*      MCV 92   MCH 30.7   MCHC 33.5        Significant Imaging:  Labs: Reviewed  X-Ray: Reviewed

## 2023-12-16 NOTE — PROGRESS NOTES
[facility]  Heart Transplant  Progress Note    Patient Name: Radha Abbott  MRN: [id number]  Admission Date: 11/9/2023  Hospital Length of Stay: 37 days  Attending Physician: Sandhya Price MD  Primary Care Provider: Vasu Kong MD  Principal Problem:LVAD (left ventricular assist device) present    Subjective:   Interval History:   Pt seen  Per Wife at bedside, pt slid to the ground  No report of the same from the staff.  This morning patient back on CRRT   SVO2 not uploaded, reported as 43, CO 4.9, CI 2.3        Continuous Infusions:   sodium chloride 0.9% 5 mL/hr at 12/15/23 1100    calcium gluconate 30 g in dextrose 5 % (D5W) 1,000 mL infusion      dextrose 10 % in water (D10W)      dextrose-sod citrate-citric ac      EPINEPHrine Stopped (12/13/23 2344)    heparin (porcine) in D5W 9 Units/kg/hr (12/15/23 1100)     Scheduled Meds:   amiodarone  400 mg Per NG tube Daily    atorvastatin  40 mg Per NG tube Daily    insulin aspart U-100  9 Units Subcutaneous 6 times per day    midodrine  15 mg Oral TID    mirtazapine  15 mg Per NG tube Nightly    pantoprazole  40 mg Intravenous Daily    polyethylene glycol  17 g Per NG tube Daily    psyllium husk (aspartame)  1 packet Oral Daily    senna-docusate 8.6-50 mg  1 tablet Per NG tube Daily    warfarin  4 mg Per NG tube Daily     PRN Meds:acetaminophen, albuterol sulfate, bisacodyL, dextrose 10 % in water (D10W), dextrose 10%, dextrose 10%, glucagon (human recombinant), insulin aspart U-100, magnesium hydroxide 400 mg/5 ml, magnesium sulfate IVPB, melatonin, potassium chloride, potassium chloride, QUEtiapine, Flushing PICC/Midline Protocol **AND** [DISCONTINUED] sodium chloride 0.9% **AND** sodium chloride 0.9%, sodium phosphate 20.01 mmol in dextrose 5 % (D5W) 250 mL IVPB, sodium phosphate 30 mmol in dextrose 5 % (D5W) 250 mL IVPB, sodium phosphate 39.99 mmol in dextrose 5 % (D5W) 250 mL IVPB    Review of patient's allergies indicates:  No  Known Allergies  Objective:     Vital Signs (Most Recent):  Temp: 97.8 °F (36.6 °C) (12/15/23 1128)  Pulse: 91 (12/15/23 1130)  Resp: 20 (12/15/23 1130)  BP: (!) 70/0 (12/15/23 1128)  SpO2: 97 % (12/15/23 1128) Vital Signs (24h Range):  Temp:  [97.7 °F (36.5 °C)-98.5 °F (36.9 °C)] 97.8 °F (36.6 °C)  Pulse:  [] 91  Resp:  [15-26] 20  SpO2:  [96 %-100 %] 97 %  BP: (70-76)/(0) 70/0  Arterial Line BP: (66-87)/(50-81) 75/59     Patient Vitals for the past 72 hrs (Last 3 readings):   Weight   12/15/23 0600 78 kg (171 lb 15.3 oz)   12/14/23 0445 78.9 kg (174 lb)   12/13/23 0228 78.5 kg (173 lb)     Body mass index is 23.32 kg/m².      Intake/Output Summary (Last 24 hours) at 12/15/2023 1202  Last data filed at 12/15/2023 1100  Gross per 24 hour   Intake 1219.08 ml   Output --   Net 1219.08 ml       Physical Exam  Constitutional:       Comments: Cachectic   HENT:      Head: Normocephalic and atraumatic.   Eyes:      Conjunctiva/sclera: Conjunctivae normal.      Pupils: Pupils are equal, round, and reactive to light.   Cardiovascular:      Rate and Rhythm: Normal rate and regular rhythm.      Comments: Do not appreciate elevated neck veins  Pulmonary:      Effort: Pulmonary effort is normal.      Breath sounds: Normal breath sounds.   Abdominal:      General: Bowel sounds are normal.      Palpations: Abdomen is soft.   Musculoskeletal:         General: No swelling. Normal range of motion.   Skin:     General: Skin is dry.      Capillary Refill: Capillary refill takes 2 to 3 seconds.      Comments: Extremities cool to touch   Neurological:      Mental Status: He is alert and oriented to person, place, and time.            Recent Labs   Lab 12/16/23  0409   WBC 20.11*   HGB 7.1*   HCT 21.2*          Recent Labs   Lab 12/16/23 0409   *  135*   K 4.0  4.0     104   CO2 22*  22*   BUN 9  9   CREATININE 0.7  0.7   CALCIUM 8.5*  8.5*   PHOS 2.8     Recent Labs   Lab 12/16/23 0409   INR 1.8*            Microbiology:  Microbiology Results (last 7 days)       Procedure Component Value Units Date/Time    Blood culture [[id number]] Collected: 12/10/23 1240    Order Status: Completed Specimen: Blood from Peripheral, Antecubital, Right Updated: 12/14/23 2012     Blood Culture, Routine No Growth to date      No Growth to date      No Growth to date      No Growth to date      No Growth to date    Blood culture [[id number]] Collected: 12/10/23 1149    Order Status: Completed Specimen: Blood from Line, Jugular, External Right Updated: 12/14/23 2012     Blood Culture, Routine No Growth to date      No Growth to date      No Growth to date      No Growth to date      No Growth to date    Culture, Respiratory with Gram Stain [[id number]] Collected: 12/11/23 1643    Order Status: Completed Specimen: Respiratory from Sputum, Expectorated Updated: 12/13/23 0954     Respiratory Culture Normal respiratory bobby      No S aureus or Pseudomonas isolated.     Gram Stain (Respiratory) <10 epithelial cells per low power field.     Gram Stain (Respiratory) Rare WBC's     Gram Stain (Respiratory) Rare Gram positive cocci    Fungus culture [[id number]] Collected: 12/06/23 1532    Order Status: Completed Specimen: Body Fluid from Lung, Right Updated: 12/12/23 0915     Fungus (Mycology) Culture Culture in progress    Narrative:      Bronchial Wash    Clostridium difficile EIA [[id number]] Collected: 12/12/23 0110    Order Status: Completed Specimen: Stool Updated: 12/12/23 0418     C. diff Antigen Negative     C difficile Toxins A+B, EIA Negative     Comment: Testing not recommended for children <24 months old.               Assessment and Plan:     63 year old male with hx of CAD s/p 3v CABG (unclear anatomy, 2009), ICM with a recent EF of 15-20% s/p ICD (medtronik 2009), DM2 (a1c 7.7), HTN, HLD, Vfib on amio admitted for HFrEF exacerbation, He underwent LVAD HM3 implantation 12/1/23 by Dr Washington.     #HFrEF s/p LVAD  - current  parameters- Flow 4.2, speed 4900, PI 3.2, Power 3.5  No alarms overnight  - On warfarin 4 mg HS, with Heparin drip, INR subtherapeutic     #ATN  - anuric, failed diuresis challenge 12/15   - Back on SLED with -400 via Right IJ trialysis   - continue midodrine     #Dysphagia  - on tube feeds    #Leucocytosis  - currently off antibiotics, no source noted for infection    #Vocal cord paralysis  - ENT has been consulted, likely bedside injection laryngoplasty to left vocal fold on Monday    #Mood disorder  - on remeron and mirtazapine     #pAFib   #vt  - on amiodarone    #Diabetes Mellitus- on Insulin, endocrine assisting  #CAD    Seen with Dr Albert Rojas MD  Heart Transplant  [facility]

## 2023-12-17 LAB
ALBUMIN SERPL BCP-MCNC: 1.8 G/DL (ref 3.5–5.2)
ALLENS TEST: ABNORMAL
ALP SERPL-CCNC: 253 U/L (ref 55–135)
ALT SERPL W/O P-5'-P-CCNC: 33 U/L (ref 10–44)
ANION GAP SERPL CALC-SCNC: 8 MMOL/L (ref 8–16)
APTT PPP: 43.4 SEC (ref 21–32)
APTT PPP: 45 SEC (ref 21–32)
AST SERPL-CCNC: 127 U/L (ref 10–40)
BASOPHILS # BLD AUTO: 0.09 K/UL (ref 0–0.2)
BASOPHILS NFR BLD: 0.5 % (ref 0–1.9)
BILIRUB DIRECT SERPL-MCNC: 0.7 MG/DL (ref 0.1–0.3)
BILIRUB SERPL-MCNC: 1.5 MG/DL (ref 0.1–1)
BUN SERPL-MCNC: 5 MG/DL (ref 8–23)
BUN SERPL-MCNC: 6 MG/DL (ref 8–23)
CA-I BLDV-SCNC: 1.09 MMOL/L (ref 1.06–1.42)
CALCIUM SERPL-MCNC: 8.2 MG/DL (ref 8.7–10.5)
CALCIUM SERPL-MCNC: 8.2 MG/DL (ref 8.7–10.5)
CALCIUM SERPL-MCNC: 8.3 MG/DL (ref 8.7–10.5)
CALCIUM SERPL-MCNC: 8.3 MG/DL (ref 8.7–10.5)
CHLORIDE SERPL-SCNC: 104 MMOL/L (ref 95–110)
CHLORIDE SERPL-SCNC: 105 MMOL/L (ref 95–110)
CO2 SERPL-SCNC: 21 MMOL/L (ref 23–29)
CO2 SERPL-SCNC: 23 MMOL/L (ref 23–29)
CO2 SERPL-SCNC: 23 MMOL/L (ref 23–29)
CO2 SERPL-SCNC: 25 MMOL/L (ref 23–29)
CREAT SERPL-MCNC: 0.5 MG/DL (ref 0.5–1.4)
CREAT SERPL-MCNC: 0.5 MG/DL (ref 0.5–1.4)
CREAT SERPL-MCNC: 0.6 MG/DL (ref 0.5–1.4)
CREAT SERPL-MCNC: 0.7 MG/DL (ref 0.5–1.4)
DIFFERENTIAL METHOD BLD: ABNORMAL
EOSINOPHIL # BLD AUTO: 0.1 K/UL (ref 0–0.5)
EOSINOPHIL NFR BLD: 0.5 % (ref 0–8)
ERYTHROCYTE [DISTWIDTH] IN BLOOD BY AUTOMATED COUNT: 20.3 % (ref 11.5–14.5)
EST. GFR  (NO RACE VARIABLE): >60 ML/MIN/1.73 M^2
GLUCOSE SERPL-MCNC: 101 MG/DL (ref 70–110)
GLUCOSE SERPL-MCNC: 101 MG/DL (ref 70–110)
GLUCOSE SERPL-MCNC: 108 MG/DL (ref 70–110)
GLUCOSE SERPL-MCNC: 195 MG/DL (ref 70–110)
HCT VFR BLD AUTO: 24.1 % (ref 40–54)
HGB BLD-MCNC: 8.1 G/DL (ref 14–18)
IMM GRANULOCYTES # BLD AUTO: 0.75 K/UL (ref 0–0.04)
IMM GRANULOCYTES NFR BLD AUTO: 3.8 % (ref 0–0.5)
INR PPP: 1.9 (ref 0.8–1.2)
LDH SERPL L TO P-CCNC: 675 U/L (ref 110–260)
LYMPHOCYTES # BLD AUTO: 1 K/UL (ref 1–4.8)
LYMPHOCYTES NFR BLD: 4.8 % (ref 18–48)
MAGNESIUM SERPL-MCNC: 1.6 MG/DL (ref 1.6–2.6)
MAGNESIUM SERPL-MCNC: 1.7 MG/DL (ref 1.6–2.6)
MAGNESIUM SERPL-MCNC: 2 MG/DL (ref 1.6–2.6)
MCH RBC QN AUTO: 30 PG (ref 27–31)
MCHC RBC AUTO-ENTMCNC: 33.6 G/DL (ref 32–36)
MCV RBC AUTO: 89 FL (ref 82–98)
MONOCYTES # BLD AUTO: 1.2 K/UL (ref 0.3–1)
MONOCYTES NFR BLD: 6.1 % (ref 4–15)
NEUTROPHILS # BLD AUTO: 16.7 K/UL (ref 1.8–7.7)
NEUTROPHILS NFR BLD: 84.3 % (ref 38–73)
NRBC BLD-RTO: 1 /100 WBC
PHOSPHATE SERPL-MCNC: 1.1 MG/DL (ref 2.7–4.5)
PHOSPHATE SERPL-MCNC: 1.9 MG/DL (ref 2.7–4.5)
PHOSPHATE SERPL-MCNC: 2.4 MG/DL (ref 2.7–4.5)
PLATELET # BLD AUTO: 418 K/UL (ref 150–450)
PMV BLD AUTO: 11.3 FL (ref 9.2–12.9)
PO2 BLDA: 23 MMHG (ref 40–60)
POC SATURATED O2: 40 % (ref 95–100)
POCT GLUCOSE: 105 MG/DL (ref 70–110)
POCT GLUCOSE: 110 MG/DL (ref 70–110)
POCT GLUCOSE: 142 MG/DL (ref 70–110)
POCT GLUCOSE: 158 MG/DL (ref 70–110)
POCT GLUCOSE: 181 MG/DL (ref 70–110)
POCT GLUCOSE: 192 MG/DL (ref 70–110)
POCT GLUCOSE: 320 MG/DL (ref 70–110)
POTASSIUM SERPL-SCNC: 4.2 MMOL/L (ref 3.5–5.1)
POTASSIUM SERPL-SCNC: 4.2 MMOL/L (ref 3.5–5.1)
POTASSIUM SERPL-SCNC: 4.3 MMOL/L (ref 3.5–5.1)
POTASSIUM SERPL-SCNC: 4.3 MMOL/L (ref 3.5–5.1)
PROT SERPL-MCNC: 6.2 G/DL (ref 6–8.4)
PROTHROMBIN TIME: 19.6 SEC (ref 9–12.5)
RBC # BLD AUTO: 2.7 M/UL (ref 4.6–6.2)
SAMPLE: ABNORMAL
SITE: ABNORMAL
SODIUM SERPL-SCNC: 134 MMOL/L (ref 136–145)
SODIUM SERPL-SCNC: 136 MMOL/L (ref 136–145)
SODIUM SERPL-SCNC: 136 MMOL/L (ref 136–145)
SODIUM SERPL-SCNC: 137 MMOL/L (ref 136–145)
WBC # BLD AUTO: 19.84 K/UL (ref 3.9–12.7)

## 2023-12-17 PROCEDURE — 82803 BLOOD GASES ANY COMBINATION: CPT

## 2023-12-17 PROCEDURE — C9113 INJ PANTOPRAZOLE SODIUM, VIA: HCPCS | Performed by: PHYSICIAN ASSISTANT

## 2023-12-17 PROCEDURE — 25000003 PHARM REV CODE 250: Performed by: THORACIC SURGERY (CARDIOTHORACIC VASCULAR SURGERY)

## 2023-12-17 PROCEDURE — 93750 INTERROGATION VAD IN PERSON: CPT | Mod: ,,, | Performed by: INTERNAL MEDICINE

## 2023-12-17 PROCEDURE — 63600175 PHARM REV CODE 636 W HCPCS: Performed by: PHYSICIAN ASSISTANT

## 2023-12-17 PROCEDURE — 20000000 HC ICU ROOM

## 2023-12-17 PROCEDURE — 99291 CRITICAL CARE FIRST HOUR: CPT | Mod: ,,, | Performed by: INTERNAL MEDICINE

## 2023-12-17 PROCEDURE — 36620 INSERTION CATHETER ARTERY: CPT

## 2023-12-17 PROCEDURE — 83735 ASSAY OF MAGNESIUM: CPT | Mod: 91

## 2023-12-17 PROCEDURE — 99900035 HC TECH TIME PER 15 MIN (STAT)

## 2023-12-17 PROCEDURE — 25000003 PHARM REV CODE 250

## 2023-12-17 PROCEDURE — 25000003 PHARM REV CODE 250: Performed by: INTERNAL MEDICINE

## 2023-12-17 PROCEDURE — 85610 PROTHROMBIN TIME: CPT | Performed by: PHYSICIAN ASSISTANT

## 2023-12-17 PROCEDURE — 25000003 PHARM REV CODE 250: Performed by: STUDENT IN AN ORGANIZED HEALTH CARE EDUCATION/TRAINING PROGRAM

## 2023-12-17 PROCEDURE — 85730 THROMBOPLASTIN TIME PARTIAL: CPT | Performed by: PHYSICIAN ASSISTANT

## 2023-12-17 PROCEDURE — 94664 DEMO&/EVAL PT USE INHALER: CPT

## 2023-12-17 PROCEDURE — 63600175 PHARM REV CODE 636 W HCPCS: Performed by: INTERNAL MEDICINE

## 2023-12-17 PROCEDURE — 90945 DIALYSIS ONE EVALUATION: CPT

## 2023-12-17 PROCEDURE — 85730 THROMBOPLASTIN TIME PARTIAL: CPT | Mod: 91 | Performed by: INTERNAL MEDICINE

## 2023-12-17 PROCEDURE — 27000248 HC VAD-ADDITIONAL DAY

## 2023-12-17 PROCEDURE — 94799 UNLISTED PULMONARY SVC/PX: CPT

## 2023-12-17 PROCEDURE — 25000003 PHARM REV CODE 250: Performed by: PHYSICIAN ASSISTANT

## 2023-12-17 PROCEDURE — 80069 RENAL FUNCTION PANEL: CPT | Performed by: STUDENT IN AN ORGANIZED HEALTH CARE EDUCATION/TRAINING PROGRAM

## 2023-12-17 PROCEDURE — 25000242 PHARM REV CODE 250 ALT 637 W/ HCPCS: Performed by: NURSE PRACTITIONER

## 2023-12-17 PROCEDURE — 90945 DIALYSIS ONE EVALUATION: CPT | Mod: ,,, | Performed by: INTERNAL MEDICINE

## 2023-12-17 PROCEDURE — 80100008 HC CRRT DAILY MAINTENANCE

## 2023-12-17 PROCEDURE — 83735 ASSAY OF MAGNESIUM: CPT | Performed by: STUDENT IN AN ORGANIZED HEALTH CARE EDUCATION/TRAINING PROGRAM

## 2023-12-17 PROCEDURE — 84075 ASSAY ALKALINE PHOSPHATASE: CPT | Performed by: STUDENT IN AN ORGANIZED HEALTH CARE EDUCATION/TRAINING PROGRAM

## 2023-12-17 PROCEDURE — 63600175 PHARM REV CODE 636 W HCPCS: Performed by: STUDENT IN AN ORGANIZED HEALTH CARE EDUCATION/TRAINING PROGRAM

## 2023-12-17 PROCEDURE — 99232 SBSQ HOSP IP/OBS MODERATE 35: CPT | Mod: ,,, | Performed by: NURSE PRACTITIONER

## 2023-12-17 PROCEDURE — 63600175 PHARM REV CODE 636 W HCPCS

## 2023-12-17 PROCEDURE — 80069 RENAL FUNCTION PANEL: CPT | Mod: 91

## 2023-12-17 PROCEDURE — 85025 COMPLETE CBC W/AUTO DIFF WBC: CPT | Performed by: THORACIC SURGERY (CARDIOTHORACIC VASCULAR SURGERY)

## 2023-12-17 PROCEDURE — 83615 LACTATE (LD) (LDH) ENZYME: CPT | Performed by: STUDENT IN AN ORGANIZED HEALTH CARE EDUCATION/TRAINING PROGRAM

## 2023-12-17 PROCEDURE — 82330 ASSAY OF CALCIUM: CPT | Performed by: STUDENT IN AN ORGANIZED HEALTH CARE EDUCATION/TRAINING PROGRAM

## 2023-12-17 RX ORDER — HYDROCODONE BITARTRATE AND ACETAMINOPHEN 500; 5 MG/1; MG/1
TABLET ORAL CONTINUOUS
Status: DISCONTINUED | OUTPATIENT
Start: 2023-12-17 | End: 2023-12-17

## 2023-12-17 RX ORDER — INSULIN ASPART 100 [IU]/ML
7 INJECTION, SOLUTION INTRAVENOUS; SUBCUTANEOUS
Status: DISCONTINUED | OUTPATIENT
Start: 2023-12-17 | End: 2023-12-19

## 2023-12-17 RX ORDER — LIDOCAINE HYDROCHLORIDE 10 MG/ML
INJECTION, SOLUTION EPIDURAL; INFILTRATION; INTRACAUDAL; PERINEURAL
Status: COMPLETED
Start: 2023-12-17 | End: 2023-12-17

## 2023-12-17 RX ORDER — LIDOCAINE HYDROCHLORIDE 10 MG/ML
10 INJECTION, SOLUTION EPIDURAL; INFILTRATION; INTRACAUDAL; PERINEURAL ONCE
Status: COMPLETED | OUTPATIENT
Start: 2023-12-17 | End: 2023-12-17

## 2023-12-17 RX ORDER — LIDOCAINE HYDROCHLORIDE 10 MG/ML
1 INJECTION INFILTRATION; PERINEURAL ONCE
Status: DISCONTINUED | OUTPATIENT
Start: 2023-12-17 | End: 2023-12-17

## 2023-12-17 RX ORDER — MAGNESIUM SULFATE HEPTAHYDRATE 40 MG/ML
2 INJECTION, SOLUTION INTRAVENOUS
Status: DISCONTINUED | OUTPATIENT
Start: 2023-12-17 | End: 2023-12-17

## 2023-12-17 RX ORDER — HYDROCODONE BITARTRATE AND ACETAMINOPHEN 500; 5 MG/1; MG/1
TABLET ORAL CONTINUOUS
Status: ACTIVE | OUTPATIENT
Start: 2023-12-17 | End: 2023-12-18

## 2023-12-17 RX ORDER — MAGNESIUM SULFATE HEPTAHYDRATE 40 MG/ML
2 INJECTION, SOLUTION INTRAVENOUS
Status: DISPENSED | OUTPATIENT
Start: 2023-12-17 | End: 2023-12-18

## 2023-12-17 RX ADMIN — INSULIN ASPART 7 UNITS: 100 INJECTION, SOLUTION INTRAVENOUS; SUBCUTANEOUS at 08:12

## 2023-12-17 RX ADMIN — DEXTROSE MONOHYDRATE, SODIUM CITRATE, AND CITRIC ACID MONOHYDRATE: 2.45; 2.2; .8 INJECTION, SOLUTION INTRAVENOUS at 04:12

## 2023-12-17 RX ADMIN — PANTOPRAZOLE SODIUM 40 MG: 40 INJECTION, POWDER, FOR SOLUTION INTRAVENOUS at 09:12

## 2023-12-17 RX ADMIN — MIDODRINE HYDROCHLORIDE 15 MG: 5 TABLET ORAL at 03:12

## 2023-12-17 RX ADMIN — WARFARIN SODIUM 4 MG: 4 TABLET ORAL at 04:12

## 2023-12-17 RX ADMIN — PSYLLIUM HUSK 1 PACKET: 3.4 POWDER ORAL at 09:12

## 2023-12-17 RX ADMIN — INSULIN ASPART 2 UNITS: 100 INJECTION, SOLUTION INTRAVENOUS; SUBCUTANEOUS at 04:12

## 2023-12-17 RX ADMIN — MAGNESIUM SULFATE HEPTAHYDRATE 2 G: 40 INJECTION, SOLUTION INTRAVENOUS at 05:12

## 2023-12-17 RX ADMIN — MIDODRINE HYDROCHLORIDE 15 MG: 5 TABLET ORAL at 08:12

## 2023-12-17 RX ADMIN — INSULIN ASPART 7 UNITS: 100 INJECTION, SOLUTION INTRAVENOUS; SUBCUTANEOUS at 04:12

## 2023-12-17 RX ADMIN — QUETIAPINE FUMARATE 50 MG: 25 TABLET ORAL at 08:12

## 2023-12-17 RX ADMIN — QUETIAPINE FUMARATE 50 MG: 25 TABLET ORAL at 01:12

## 2023-12-17 RX ADMIN — MIRTAZAPINE 15 MG: 15 TABLET, FILM COATED ORAL at 08:12

## 2023-12-17 RX ADMIN — CALCIUM GLUCONATE: 98 INJECTION, SOLUTION INTRAVENOUS at 04:12

## 2023-12-17 RX ADMIN — SODIUM PHOSPHATE, MONOBASIC, MONOHYDRATE AND SODIUM PHOSPHATE, DIBASIC, ANHYDROUS 30 MMOL: 142; 276 INJECTION, SOLUTION INTRAVENOUS at 05:12

## 2023-12-17 RX ADMIN — SODIUM PHOSPHATE, MONOBASIC, MONOHYDRATE AND SODIUM PHOSPHATE, DIBASIC, ANHYDROUS 39.99 MMOL: 142; 276 INJECTION, SOLUTION INTRAVENOUS at 04:12

## 2023-12-17 RX ADMIN — INSULIN ASPART 9 UNITS: 100 INJECTION, SOLUTION INTRAVENOUS; SUBCUTANEOUS at 03:12

## 2023-12-17 RX ADMIN — CALCIUM GLUCONATE: 98 INJECTION, SOLUTION INTRAVENOUS at 02:12

## 2023-12-17 RX ADMIN — POLYETHYLENE GLYCOL 3350 17 G: 17 POWDER, FOR SOLUTION ORAL at 09:12

## 2023-12-17 RX ADMIN — LIDOCAINE HYDROCHLORIDE 100 MG: 10 INJECTION, SOLUTION EPIDURAL; INFILTRATION; INTRACAUDAL at 02:12

## 2023-12-17 RX ADMIN — SENNOSIDES AND DOCUSATE SODIUM 1 TABLET: 8.6; 5 TABLET ORAL at 09:12

## 2023-12-17 RX ADMIN — MIDODRINE HYDROCHLORIDE 15 MG: 5 TABLET ORAL at 09:12

## 2023-12-17 RX ADMIN — AMIODARONE HYDROCHLORIDE 400 MG: 200 TABLET ORAL at 09:12

## 2023-12-17 RX ADMIN — ATORVASTATIN CALCIUM 40 MG: 10 TABLET, FILM COATED ORAL at 09:12

## 2023-12-17 RX ADMIN — LIDOCAINE HYDROCHLORIDE 100 MG: 10 INJECTION, SOLUTION EPIDURAL; INFILTRATION; INTRACAUDAL; PERINEURAL at 02:12

## 2023-12-17 NOTE — PROGRESS NOTES
ROSYD restarted to [facility] CVC after patient room change.  Tolerated well.    POC discussed with primary RN.

## 2023-12-17 NOTE — CARE UPDATE
SICU PLAN OF CARE NOTE    Dx: LVAD (left ventricular assist device) present      Neuro: AAO x4, Follows Commands, and Moves All Extremities    Cardiac: NSR    Respiratory: Room Air Intermittent sats > 92     Gtts: Heparin    Urine Output: Anuric 0 cc/shift    Dialysis: SLED, UF Rate: 250/hr    Drains: NGT. Tube feeds at goal, 40ml/hr     Diet: NPO    VAD: Speed at 4900, Power 3.2-3.3, Flow 3.9-4.2, PI 3.2-3.5       Labs/Accuchecks: Accuchecks Q 4, Reanal labs Q 8.     Skin:  All skin remains free from injury.  Patient turned Q2,  mattress inflated, and bed working correctly. See flowsheet for documentation of skin breakdown.

## 2023-12-17 NOTE — PROGRESS NOTES
[facility]  Nephrology  Progress Note    Patient Name: Radha Abbott  MRN: [id number]  Admission Date: 11/9/2023  Hospital Length of Stay: 38 days  Attending Provider: Sandhya Price MD   Primary Care Physician: Vasu Kong MD  Principal Problem:LVAD (left ventricular assist device) present    Subjective:     HPI: Mr. Abbott is a 63-year-old man with ischemic cardiomyopathy with most recent TTE showing EF 10 -15% and G3DD s/p Med[employer] ICM placement on chronic dobutamine infusion, CAD s/p x3 vessel CABG back in 2009, type 2 diabetes mellitus (most recent hemoglobin A1c 7.6%), hypertension, HLD, history of ventricular fibrillation for which he is on amiodarone and CKD IIIb (baseline creatinine ~2-2.2) who was initially admitted on 11/9 with heart failure exacerbation and hypervolemia for which he was managed with inotrope with dobutamine in addition to IV diuresis. He ultimately underwent IABP placed on 11/21 for mechanical hemodynamic support and subsequently underwent LVAD placement on 12/1. His renal function appears to be mostly stable with IVÁN and creatinine in mid 2s to 3s in addition he is still making urine with Lasix infusion however on 12/5 Nephrology consulted given concern for uremia with BUN 72.     Interval History:   No acute events overnight, on SLEED with citrate, BP 82/0, ha LVAD, CVP of 10.  Will continue SLED with citrate for some metabolic and fluid clearance.  Review of patient's allergies indicates:  No Known Allergies  Current Facility-Administered Medications   Medication Frequency    0.9%  NaCl infusion Continuous    acetaminophen tablet 650 mg Q6H PRN    albuterol sulfate nebulizer solution 2.5 mg Q4H PRN    amiodarone tablet 400 mg Daily    atorvastatin tablet 40 mg Daily    bisacodyL suppository 10 mg Daily PRN    calcium gluconate 1 g in NS IVPB (premixed) PRN    calcium gluconate 1 g in NS IVPB (premixed) PRN    calcium gluconate 1 g in NS IVPB (premixed)  PRN    calcium gluconate 30 g in dextrose 5 % (D5W) 1,000 mL infusion Continuous    dextrose 10 % infusion Continuous PRN    dextrose 10% bolus 125 mL 125 mL PRN    dextrose 10% bolus 250 mL 250 mL PRN    dextrose-sod citrate-citric ac 2.45-2.2 gram- 800 mg/100 mL Soln Continuous    EPINEPHrine 5 mg in dextrose 5% 250 mL infusion (premix) Continuous    glucagon (human recombinant) injection 1 mg PRN    heparin 25,000 units in dextrose 5% 250 ml (100 units/mL) infusion MINIMAL INTENSITY nomogram - [facility] Continuous    insulin aspart U-100 pen 0-10 Units Q4H PRN    insulin aspart U-100 pen 7 Units 6 times per day    magnesium hydroxide 400 mg/5 ml suspension 2,400 mg Daily PRN    magnesium sulfate 2g in water 50mL IVPB (premix) PRN    magnesium sulfate 2g in water 50mL IVPB (premix) PRN    melatonin tablet 6 mg Nightly PRN    midodrine tablet 15 mg TID    mirtazapine tablet 15 mg Nightly    pantoprazole injection 40 mg Daily    polyethylene glycol packet 17 g Daily    potassium chloride 20 mEq in 100 mL IVPB (FOR CENTRAL LINE ADMINISTRATION ONLY) PRN    potassium chloride 20 mEq in 100 mL IVPB (FOR CENTRAL LINE ADMINISTRATION ONLY) PRN    potassium chloride 40 mEq in 100 mL IVPB (FOR CENTRAL LINE ADMINISTRATION ONLY) PRN    psyllium husk (aspartame) 3.4 gram packet 1 packet Daily    QUEtiapine tablet 50 mg Q6H PRN    senna-docusate 8.6-50 mg per tablet 1 tablet Daily    sodium chloride 0.9% flush 10 mL PRN    sodium phosphate 15 mmol in dextrose 5 % (D5W) 250 mL IVPB PRN    sodium phosphate 20.01 mmol in dextrose 5 % (D5W) 250 mL IVPB PRN    sodium phosphate 30 mmol in dextrose 5 % (D5W) 250 mL IVPB PRN    warfarin (COUMADIN) tablet 4 mg Daily       Objective:     Vital Signs (Most Recent):  Temp: 97.5 °F (36.4 °C) (12/17/23 0715)  Pulse: 96 (12/17/23 0800)  Resp: (!) 33 (12/17/23 0800)  BP: (!) 82/0 (12/17/23 0715)  SpO2: 95 % (12/17/23 0300) Vital Signs (24h Range):  Temp:  [97.5 °F (36.4 °C)-98.4 °F (36.9 °C)] 97.5 °F  (36.4 °C)  Pulse:  [] 96  Resp:  [16-40] 33  SpO2:  [92 %-96 %] 95 %  BP: (70-82)/(0) 82/0  Arterial Line BP: (72-80)/(0-66) 80/0     Weight: 85.7 kg (189 lb) (12/17/23 0500)  Body mass index is 25.63 kg/m².  Body surface area is 2.09 meters squared.    I/O last 3 completed shifts:  In: 35779.1 [I.V.:1271.2; Blood:350; NG/GT:1070; IV Piggyback:7534]  Out: 9225 [Drains:10; Other:9215]     Physical Exam  Pulmonary:      Effort: Pulmonary effort is normal.   Abdominal:      Palpations: Abdomen is soft.   Musculoskeletal:         General: No swelling.   Skin:     General: Skin is warm.   Neurological:      General: No focal deficit present.      Mental Status: He is alert.   Psychiatric:         Mood and Affect: Mood normal.          Significant Labs:  BMP:   Recent Labs   Lab 12/17/23  0346     101     136   K 4.2  4.2     105   CO2 23  23   BUN 5*  5*   CREATININE 0.5  0.5   CALCIUM 8.3*  8.3*   MG 1.7     CBC:   Recent Labs   Lab 12/17/23  0346   WBC 19.84*   RBC 2.70*   HGB 8.1*   HCT 24.1*      MCV 89   MCH 30.0   MCHC 33.6     All labs within the past 24 hours have been reviewed.     Significant Imaging:  Labs: Reviewed  X-Ray: Reviewed  Assessment/Plan:     Cardiac/Vascular  Acute on chronic combined systolic and diastolic CHF, NYHA class 4  Patient is identified as having Combined Systolic and Diastolic heart failure that is Acute on chronic. CHF is currently uncontrolled due to JVD, Rales/crackles on pulmonary exam, and Pulmonary edema/pleural effusion on CXR. Latest ECHO performed and demonstrates- Results for orders placed during the hospital encounter of 11/09/23    Echo    Interpretation Summary    Left Ventricle: The left ventricle is moderately dilated. Mildly increased ventricular mass. Normal wall thickness. There is eccentric hypertrophy. Severe global hyperkinesis present. There is severely reduced systolic function with a visually estimated ejection fraction of  10 -15%. Grade III diastolic dysfunction.    Right Ventricle: Normal right ventricular cavity size. Systolic function is normal. Pacemaker lead present in the ventricle.    Left Atrium: Left atrium is severely dilated.    Mitral Valve: There is annular and bileaflet tethering. There is moderate regurgitation with a centrally directed jet.    Tricuspid Valve: There is mild regurgitation with a centrally directed jet.    Pulmonary Artery: The estimated pulmonary artery systolic pressure is 41 mmHg.    IVC/SVC: Normal venous pressure at 3 mmHg.    last BNP reviewed- and noted below   Recent Labs   Lab 12/15/23  0311   BNP 1,312*       - management per primary team  - LVAD for mechanical support    Renal/  Acute renal failure with acute tubular necrosis superimposed on stage 3b chronic kidney disease  - suspect acute tubular injury secondary to hypotension/cardiogenic shock likely compounded by intra-arterial contrast and anemia with urinary sediment with granular casts  - failed diuretic trial, anuric, will continue SLED with citrate  - Q8H RFP, magnesium and ionized calcium per SLED protocol with citrate  - renal diet/tube feeds when not NPO, volume restriction per primary team  - strict I/O's and daily weights  - keep MAP > 65 mmHg  - renally all dose medications to eGFR  - avoid nephrotoxic agents wean feasible (i.e. NSAIDs, intra-arterial contrast, supra-therapeutic vancomycin levels, etc.)        Thank you for your consult. I will follow-up with patient. Please contact us if you have any additional questions.    Kamilla Angela MD  Nephrology  [facility]    ATTENDING PHYSICIAN ATTESTATION  I have personally verified the history and examined the patient. I thoroughly reviewed the demographic, clinical, laboratorial and imaging information available in medical records. I agree with the assessment and recommendations provided by the subspecialty resident who was under my supervision.      DIALYSIS (CRRT) NOTE  Patient evaluated while undergoing Slow Low Efficiency Dialysis (SLED) indicated for IVÁN and hemodynamic instability. No complications, tolerating session with current UFR. Will continue current support.

## 2023-12-17 NOTE — SUBJECTIVE & OBJECTIVE
Interval History:   No acute events overnight, on SLEED with citrate, BP 82/0, ha LVAD, CVP of 10.  Will continue SLED with citrate for some metabolic and fluid clearance.  Review of patient's allergies indicates:  No Known Allergies  Current Facility-Administered Medications   Medication Frequency    0.9%  NaCl infusion Continuous    acetaminophen tablet 650 mg Q6H PRN    albuterol sulfate nebulizer solution 2.5 mg Q4H PRN    amiodarone tablet 400 mg Daily    atorvastatin tablet 40 mg Daily    bisacodyL suppository 10 mg Daily PRN    calcium gluconate 1 g in NS IVPB (premixed) PRN    calcium gluconate 1 g in NS IVPB (premixed) PRN    calcium gluconate 1 g in NS IVPB (premixed) PRN    calcium gluconate 30 g in dextrose 5 % (D5W) 1,000 mL infusion Continuous    dextrose 10 % infusion Continuous PRN    dextrose 10% bolus 125 mL 125 mL PRN    dextrose 10% bolus 250 mL 250 mL PRN    dextrose-sod citrate-citric ac 2.45-2.2 gram- 800 mg/100 mL Soln Continuous    EPINEPHrine 5 mg in dextrose 5% 250 mL infusion (premix) Continuous    glucagon (human recombinant) injection 1 mg PRN    heparin 25,000 units in dextrose 5% 250 ml (100 units/mL) infusion MINIMAL INTENSITY nomogram - [facility] Continuous    insulin aspart U-100 pen 0-10 Units Q4H PRN    insulin aspart U-100 pen 7 Units 6 times per day    magnesium hydroxide 400 mg/5 ml suspension 2,400 mg Daily PRN    magnesium sulfate 2g in water 50mL IVPB (premix) PRN    magnesium sulfate 2g in water 50mL IVPB (premix) PRN    melatonin tablet 6 mg Nightly PRN    midodrine tablet 15 mg TID    mirtazapine tablet 15 mg Nightly    pantoprazole injection 40 mg Daily    polyethylene glycol packet 17 g Daily    potassium chloride 20 mEq in 100 mL IVPB (FOR CENTRAL LINE ADMINISTRATION ONLY) PRN    potassium chloride 20 mEq in 100 mL IVPB (FOR CENTRAL LINE ADMINISTRATION ONLY) PRN    potassium chloride 40 mEq in 100 mL IVPB (FOR CENTRAL LINE ADMINISTRATION ONLY) PRN    psyllium husk  (aspartame) 3.4 gram packet 1 packet Daily    QUEtiapine tablet 50 mg Q6H PRN    senna-docusate 8.6-50 mg per tablet 1 tablet Daily    sodium chloride 0.9% flush 10 mL PRN    sodium phosphate 15 mmol in dextrose 5 % (D5W) 250 mL IVPB PRN    sodium phosphate 20.01 mmol in dextrose 5 % (D5W) 250 mL IVPB PRN    sodium phosphate 30 mmol in dextrose 5 % (D5W) 250 mL IVPB PRN    warfarin (COUMADIN) tablet 4 mg Daily       Objective:     Vital Signs (Most Recent):  Temp: 97.5 °F (36.4 °C) (12/17/23 0715)  Pulse: 96 (12/17/23 0800)  Resp: (!) 33 (12/17/23 0800)  BP: (!) 82/0 (12/17/23 0715)  SpO2: 95 % (12/17/23 0300) Vital Signs (24h Range):  Temp:  [97.5 °F (36.4 °C)-98.4 °F (36.9 °C)] 97.5 °F (36.4 °C)  Pulse:  [] 96  Resp:  [16-40] 33  SpO2:  [92 %-96 %] 95 %  BP: (70-82)/(0) 82/0  Arterial Line BP: (72-80)/(0-66) 80/0     Weight: 85.7 kg (189 lb) (12/17/23 0500)  Body mass index is 25.63 kg/m².  Body surface area is 2.09 meters squared.    I/O last 3 completed shifts:  In: 19422.1 [I.V.:1271.2; Blood:350; NG/GT:1070; IV Piggyback:7534]  Out: 9225 [Drains:10; Other:9215]     Physical Exam  Pulmonary:      Effort: Pulmonary effort is normal.   Abdominal:      Palpations: Abdomen is soft.   Musculoskeletal:         General: No swelling.   Skin:     General: Skin is warm.   Neurological:      General: No focal deficit present.      Mental Status: He is alert.   Psychiatric:         Mood and Affect: Mood normal.          Significant Labs:  BMP:   Recent Labs   Lab 12/17/23  0346     101     136   K 4.2  4.2     105   CO2 23  23   BUN 5*  5*   CREATININE 0.5  0.5   CALCIUM 8.3*  8.3*   MG 1.7     CBC:   Recent Labs   Lab 12/17/23  0346   WBC 19.84*   RBC 2.70*   HGB 8.1*   HCT 24.1*      MCV 89   MCH 30.0   MCHC 33.6     All labs within the past 24 hours have been reviewed.     Significant Imaging:  Labs: Reviewed  X-Ray: Reviewed

## 2023-12-17 NOTE — PROGRESS NOTES
[facility]  Heart Transplant  Progress Note    Patient Name: Radha Abbott  MRN: [id number]  Admission Date: 11/9/2023  Hospital Length of Stay: 38 days  Attending Physician: Sandhya Price MD  Primary Care Provider: Vasu Kong MD  Principal Problem:LVAD (left ventricular assist device) present    Subjective:   Interval History:   Pt seen  Comfortable  No acute events overnight  Received 1 unit PRBC yesterday  Plan to place an A line today   Continues to have no urine output  Mild transaminitis noted   INR 12/17 was 1.9     12/17-SVO2 57, CO 6.3, CI 3.1      Continuous Infusions:   sodium chloride 0.9% 5 mL/hr at 12/15/23 1100    calcium gluconate 30 g in dextrose 5 % (D5W) 1,000 mL infusion      dextrose 10 % in water (D10W)      dextrose-sod citrate-citric ac      EPINEPHrine Stopped (12/13/23 4914)    heparin (porcine) in D5W 9 Units/kg/hr (12/15/23 1100)     Scheduled Meds:   amiodarone  400 mg Per NG tube Daily    atorvastatin  40 mg Per NG tube Daily    insulin aspart U-100  9 Units Subcutaneous 6 times per day    midodrine  15 mg Oral TID    mirtazapine  15 mg Per NG tube Nightly    pantoprazole  40 mg Intravenous Daily    polyethylene glycol  17 g Per NG tube Daily    psyllium husk (aspartame)  1 packet Oral Daily    senna-docusate 8.6-50 mg  1 tablet Per NG tube Daily    warfarin  4 mg Per NG tube Daily     PRN Meds:acetaminophen, albuterol sulfate, bisacodyL, dextrose 10 % in water (D10W), dextrose 10%, dextrose 10%, glucagon (human recombinant), insulin aspart U-100, magnesium hydroxide 400 mg/5 ml, magnesium sulfate IVPB, melatonin, potassium chloride, potassium chloride, QUEtiapine, Flushing PICC/Midline Protocol **AND** [DISCONTINUED] sodium chloride 0.9% **AND** sodium chloride 0.9%, sodium phosphate 20.01 mmol in dextrose 5 % (D5W) 250 mL IVPB, sodium phosphate 30 mmol in dextrose 5 % (D5W) 250 mL IVPB, sodium phosphate 39.99 mmol in dextrose 5 % (D5W) 250 mL  IVPB    Review of patient's allergies indicates:  No Known Allergies  Objective:     Vital Signs (Most Recent):  Temp: 97.8 °F (36.6 °C) (12/15/23 1128)  Pulse: 91 (12/15/23 1130)  Resp: 20 (12/15/23 1130)  BP: (!) 70/0 (12/15/23 1128)  SpO2: 97 % (12/15/23 1128) Vital Signs (24h Range):  Temp:  [97.7 °F (36.5 °C)-98.5 °F (36.9 °C)] 97.8 °F (36.6 °C)  Pulse:  [] 91  Resp:  [15-26] 20  SpO2:  [96 %-100 %] 97 %  BP: (70-76)/(0) 70/0  Arterial Line BP: (66-87)/(50-81) 75/59     Patient Vitals for the past 72 hrs (Last 3 readings):   Weight   12/15/23 0600 78 kg (171 lb 15.3 oz)   12/14/23 0445 78.9 kg (174 lb)   12/13/23 0228 78.5 kg (173 lb)     Body mass index is 23.32 kg/m².      Intake/Output Summary (Last 24 hours) at 12/15/2023 1202  Last data filed at 12/15/2023 1100  Gross per 24 hour   Intake 1219.08 ml   Output --   Net 1219.08 ml       Physical Exam  Constitutional:       Comments: Cachectic   HENT:      Head: Normocephalic and atraumatic.   Eyes:      Conjunctiva/sclera: Conjunctivae normal.      Pupils: Pupils are equal, round, and reactive to light.   Cardiovascular:      Rate and Rhythm: Normal rate and regular rhythm.      Comments: Do not appreciate elevated neck veins  Pulmonary:      Effort: Pulmonary effort is normal.      Breath sounds: Normal breath sounds.   Abdominal:      General: Bowel sounds are normal.      Palpations: Abdomen is soft.   Musculoskeletal:         General: No swelling. Normal range of motion.   Skin:     General: Skin is dry.   Comments: Extremities cool to touch   Neurological:      Mental Status: He is alert and oriented to person, place, and time.            Recent Labs   Lab 12/17/23  0346   WBC 19.84*   HGB 8.1*   HCT 24.1*            Recent Labs   Lab 12/17/23 0346     136   K 4.2  4.2     105   CO2 23 23   BUN 5*  5*   CREATININE 0.5  0.5   CALCIUM 8.3*  8.3*   PHOS 1.9*       Recent Labs   Lab 12/17/23 0346   INR 1.9*              Microbiology:  Microbiology Results (last 7 days)       Procedure Component Value Units Date/Time    Blood culture [[id number]] Collected: 12/10/23 1240    Order Status: Completed Specimen: Blood from Peripheral, Antecubital, Right Updated: 12/14/23 2012     Blood Culture, Routine No Growth to date      No Growth to date      No Growth to date      No Growth to date      No Growth to date    Blood culture [[id number]] Collected: 12/10/23 1149    Order Status: Completed Specimen: Blood from Line, Jugular, External Right Updated: 12/14/23 2012     Blood Culture, Routine No Growth to date      No Growth to date      No Growth to date      No Growth to date      No Growth to date    Culture, Respiratory with Gram Stain [[id number]] Collected: 12/11/23 1643    Order Status: Completed Specimen: Respiratory from Sputum, Expectorated Updated: 12/13/23 0954     Respiratory Culture Normal respiratory bobby      No S aureus or Pseudomonas isolated.     Gram Stain (Respiratory) <10 epithelial cells per low power field.     Gram Stain (Respiratory) Rare WBC's     Gram Stain (Respiratory) Rare Gram positive cocci    Fungus culture [[id number]] Collected: 12/06/23 1532    Order Status: Completed Specimen: Body Fluid from Lung, Right Updated: 12/12/23 0915     Fungus (Mycology) Culture Culture in progress    Narrative:      Bronchial Wash    Clostridium difficile EIA [[id number]] Collected: 12/12/23 0110    Order Status: Completed Specimen: Stool Updated: 12/12/23 0418     C. diff Antigen Negative     C difficile Toxins A+B, EIA Negative     Comment: Testing not recommended for children <24 months old.               Assessment and Plan:     63 year old male with hx of CAD s/p 3v CABG (unclear anatomy, 2009), ICM with a recent EF of 15-20% s/p ICD (medtronik 2009), DM2 (a1c 7.7), HTN, HLD, Vfib on amio admitted for HFrEF exacerbation, He underwent LVAD HM3 implantation 12/1/23 by Dr Washington.     #HFrEF s/p LVAD  - current  parameters- Flow 4.2, speed 4900, PI 3.8, Power 3.3  No alarms overnight  - On warfarin 4 mg HS, with Heparin drip, INR 1.9 on 12/17  - Plan to place A-Line 12/17 for BP monitoring     #Transaminitis  - uptrending AST upto 127 (from 57)  - STOP Lipitor 12/17  - will continue to trend liver function     #ATN  - anuric, failed diuresis challenge 12/15   - Back on SLED with -400 via Right IJ trialysis   - continue midodrine     #Dysphagia  - on tube feeds    #Leukocytosis  -- REPEAT CXR 12/18 to assess for pulmonary congestion   - currently off antibiotics, no source noted for infection    #Vocal cord paralysis  - ENT has been consulted, likely bedside injection laryngoplasty to left vocal fold on Monday    #Mood disorder  #Delerium  - on remeron and mirtazapine   - PRN seroquel 50 mg through NG tube     #pAFib   #vt  - on amiodarone    #Diabetes Mellitus- on Insulin, endocrine assisting  #CAD    Seen with Dr Albert Rojas MD  Heart Transplant  [facility]

## 2023-12-17 NOTE — ASSESSMENT & PLAN NOTE
BG goal 140-180.      - Novolog 7 units with scheduled TF. (Hold if TF is stopped or if BG < 100 mg/dL); (30% reduction due to BG below goal.)  - BG checks q4hr while on TF/NPO   - Novolog (aspart) insulin prn for BG excursions [facility] (150/25)   - Hypoglycemia protocol in place    ** Please notify Endocrine for any change and/or advance in diet**  ** Please call Endocrine for any BG related issues **    Discharge Planning:   TBD. Please notify endocrinology prior to discharge.

## 2023-12-17 NOTE — ASSESSMENT & PLAN NOTE
Lab Results   Component Value Date    CREATININE 0.5 12/17/2023    CREATININE 0.5 12/17/2023     Avoid insulin stacking  Titrate insulin slowly

## 2023-12-17 NOTE — CARE UPDATE
Heart Transplant Care Update Note:    Hemodynamics:  CVP:8  SVO2: 38  CO: 5.4  CI: 2.6  SVR: 979        Continuous Infusions:   sodium chloride 0.9% 5 mL/hr at 12/16/23 1800    dextrose 10 % in water (D10W)      EPINEPHrine Stopped (12/13/23 2344)    heparin (porcine) in D5W Stopped (12/16/23 1747)       Intake/Output Summary (Last 24 hours) at 12/16/2023 1854  Last data filed at 12/16/2023 1840  Gross per 24 hour   Intake 6160.3 ml   Output 5354 ml   Net 806.3 ml           Plan:  1 unit of Leukoreduced RBC.  Given 100mg gabapentin for leg pain    Discussed with HTS Attending      Erin CONRAD MD  Cardiovascular Disease PGY IV  [facility]

## 2023-12-17 NOTE — ASSESSMENT & PLAN NOTE
- suspect acute tubular injury secondary to hypotension/cardiogenic shock likely compounded by intra-arterial contrast and anemia with urinary sediment with granular casts  - failed diuretic trial yesterday, anuric, will continue SLED with citrate  - Q8H RFP, magnesium and ionized calcium per SLED protocol with citrate  - renal diet/tube feeds when not NPO, volume restriction per primary team  - strict I/O's and daily weights  - keep MAP > 65 mmHg  - renally all dose medications to eGFR  - avoid nephrotoxic agents wean feasible (i.e. NSAIDs, intra-arterial contrast, supra-therapeutic vancomycin levels, etc.)

## 2023-12-17 NOTE — PLAN OF CARE
NAEON. 1 PRBC. Doppler BP MAP 70-80. Only able to intermittently obtain SpO2 with portable pulse ox, 95%. Heparin gtt. TF @ 40cc/hr, minimal residuals. SLED with citrate and calcium overnight, UF tolerating at 400. LVAD 4900, flows 3.8-4.1, no low flow alarms. Electrolytes replaced as ordered.    Patient having difficulty sleeping throughout the night, attempting to get out of bed, constantly restless and pulling at lines, PRN seroquel and scheduled remeron administered with minimal relief. Reorientation provided as needed. Bed alarm set. Pt also complaining of right foot pain, takes gabapentin at home for restless leg syndrome, one time dose given.

## 2023-12-17 NOTE — PROGRESS NOTES
12/16/23 1840   Treatment   Treatment Type SLED   Treatment Status Restart   Dialysis Machine Number [id number]   Dialyzer Time (hours) 0   BVP (Liters) 0 L   Solutions Labeled and Current  Yes   Access Temporary Cath;Right;IJ   Catheter Dressing Intact  Yes   Alarms Engaged Yes   CRRT Comments crrt restarted as ordered   Prescription   Time (Hours) Continuous   Dialysate K + (mEq/L) 4   Dialysate CA + (mEq/L) 2   Dialysate HCO3 - (Bicarb) (mEq/L) 30   Dialysate Na + (mEq/L) 140   Cartridge Type Other  (r300)   Dialysate Flow Rate (mL/min) 200   UF Goal Rate 400 mL/hr   CRRT Hourly Documentation   Blood Flow (mL/min) 150   UF Rate 250 cc/hr   Arterial Pressure (mmHg) -50 mmHg   Venous Pressure (mmHg) 60 mmHg   Effluent Pressure (EP) (mmHg) 10 mmHg   Total UF (Hourly Cleared) (mL) 0     SLED restarted as ordered. RIJ CVC aspirated, flushed, and accessed using aseptic technique. Lines connected and secured.

## 2023-12-17 NOTE — PROGRESS NOTES
12/17/2023  Deni Frye    Current provider:  Sandhya Price MD    Device interrogation:      12/17/2023     8:00 AM 12/17/2023     6:00 AM 12/17/2023     5:00 AM 12/17/2023     4:00 AM 12/17/2023     3:00 AM 12/17/2023     2:00 AM 12/17/2023     1:00 AM   TXP LVAD INTERROGATIONS   Type HeartMate3         Flow 4.1 4.1 4.1 4.1 4.2 4.1 4.2   Speed 4900         PI  3.9 3.6 3.5 3.1 3.1 3.3   Power ([place])  3.2 3.4 3.4 3.3 3.3 3.3          Rounded on Ledric Abbott to ensure all mechanical assist device settings (IABP or VAD) were appropriate and all parameters were within limits.  I was able to ensure all back up equipment was present, the staff had no issues, and the Perfusion Department daily rounding was complete.      For implantable VADs: Interrogation of Ventricular assist device was performed with analysis of device parameters and review of device function. I have personally reviewed the interrogation findings and agree with findings as stated.     In emergency, the nursing units have been notified to contact the perfusion department either by:  Calling [phone] from 630am to 4pm Mon thru Fri, utilizing the On-Call Finder functionality of Epic and searching for Perfusion, or by contacting the hospital  from 4pm to 630am and on weekends and asking to speak with the perfusionist on call.    8:52 AM

## 2023-12-17 NOTE — PROGRESS NOTES
[facility] - Surgical Intensive Care  Endocrinology  Progress Note    Admit Date: 11/9/2023     Reason for Consult: Management of T2DM, Hyperglycemia     Surgical Procedure and Date: n/a    Diabetes diagnosis year: 1998    Home Diabetes Medications:  Metformin (off since October)   Lab Results   Component Value Date    HGBA1C 7.6 (H) 10/12/2023       How often checking glucose at home?  Once daily in the AM    BG readings on regimen: 150-160s  Hypoglycemia on the regimen?  No  Missed doses on regimen?  n/a    Diabetes Complications include:     Hyperglycemia    Complicating diabetes co morbidities:   CAD s/p CABG, HTN, HLD      HPI:   Patient is a 63 y.o. male with a diagnosis of CAD s/p 3v CABG (unclear anatomy, 2009), ICM with a recent EF of 15-20% s/p ICD (medtronik 2009), DM2 (a1c 7.7), HTN, HLD, Vfib on amio who presents to the ED with CC of SOB. In the ED he was AF with stable VS on RA.  CBC showing chronic anemia.  CMP notable for acute on chronic CKD with baseline around 2.1 and Cr now 2.6.  BNP elevated.  Lactate neg.  CXR showing pulmonary edema. He was subsequently admitted to the CCU for diuresis.  Endocrinology consulted for management of T2DM.          No new subjective & objective note has been filed under this hospital service since the last note was generated.      ASSESSMENT and PLAN    Cardiac/Vascular  * LVAD (left ventricular assist device) present  Optimize BG control to improve wound healing        PAF (paroxysmal atrial fibrillation)  May increase insulin resistance.         Acute on chronic combined systolic and diastolic CHF, NYHA class 4  Managed per primary team  Optimize BG control  S/p LVAD     Renal/  Acute renal failure with acute tubular necrosis superimposed on stage 3b chronic kidney disease  Lab Results   Component Value Date    CREATININE 0.5 12/17/2023    CREATININE 0.5 12/17/2023     Avoid insulin stacking  Titrate insulin slowly       Endocrine  Type 2 diabetes mellitus  without complication, without long-term current use of insulin  BG goal 140-180.      - Novolog 7 units with scheduled TF. (Hold if TF is stopped or if BG < 100 mg/dL); (30% reduction due to BG below goal.)  - BG checks q4hr while on TF/NPO   - Novolog (aspart) insulin prn for BG excursions [facility] [place] (150/25)   - Hypoglycemia protocol in place    ** Please notify Endocrine for any change and/or advance in diet**  ** Please call Endocrine for any BG related issues **    Discharge Planning:   TBD. Please notify endocrinology prior to discharge.                 Erasmo Parrish, DNP, FNP  Endocrinology  [facility] - Surgical Intensive Care

## 2023-12-17 NOTE — PROGRESS NOTES
12/17/23 0317   Treatment   Treatment Type SLED   Treatment Status Daily equipment check   Dialysis Machine Number [id number]   Dialyzer Time (hours) 8.33   BVP (Liters) 74.8 L   Solutions Labeled and Current  Yes   Access Temporary Cath   Catheter Dressing Intact  Yes   Alarms Engaged Yes   CRRT Comments daily check   $ CRRT Charges   $ CRRT Daily Assessment Complete   $ CRRT Daily Maintenance Complete     Daily check completed. Orders and machine settings verified

## 2023-12-18 PROBLEM — N17.0 ACUTE RENAL FAILURE WITH TUBULAR NECROSIS: Status: ACTIVE | Noted: 2023-12-18

## 2023-12-18 PROBLEM — R64 CACHEXIA: Status: ACTIVE | Noted: 2023-12-18

## 2023-12-18 PROBLEM — R06.02 SHORTNESS OF BREATH: Status: ACTIVE | Noted: 2023-12-18

## 2023-12-18 LAB
ALBUMIN SERPL BCP-MCNC: 1.7 G/DL (ref 3.5–5.2)
ALLENS TEST: ABNORMAL
ALP SERPL-CCNC: 245 U/L (ref 55–135)
ALT SERPL W/O P-5'-P-CCNC: 32 U/L (ref 10–44)
ANION GAP SERPL CALC-SCNC: 7 MMOL/L (ref 8–16)
ANION GAP SERPL CALC-SCNC: 7 MMOL/L (ref 8–16)
APTT PPP: 48.3 SEC (ref 21–32)
AST SERPL-CCNC: 91 U/L (ref 10–40)
BASOPHILS # BLD AUTO: 0.05 K/UL (ref 0–0.2)
BASOPHILS NFR BLD: 0.3 % (ref 0–1.9)
BILIRUB DIRECT SERPL-MCNC: 0.5 MG/DL (ref 0.1–0.3)
BILIRUB SERPL-MCNC: 1.3 MG/DL (ref 0.1–1)
BNP SERPL-MCNC: 1484 PG/ML (ref 0–99)
BUN SERPL-MCNC: 5 MG/DL (ref 8–23)
BUN SERPL-MCNC: 6 MG/DL (ref 8–23)
CA-I BLDV-SCNC: 1.07 MMOL/L (ref 1.06–1.42)
CALCIUM SERPL-MCNC: 7.9 MG/DL (ref 8.7–10.5)
CALCIUM SERPL-MCNC: 8 MG/DL (ref 8.7–10.5)
CHLORIDE SERPL-SCNC: 105 MMOL/L (ref 95–110)
CHLORIDE SERPL-SCNC: 109 MMOL/L (ref 95–110)
CO2 SERPL-SCNC: 22 MMOL/L (ref 23–29)
CO2 SERPL-SCNC: 24 MMOL/L (ref 23–29)
CREAT SERPL-MCNC: 0.5 MG/DL (ref 0.5–1.4)
CREAT SERPL-MCNC: 0.6 MG/DL (ref 0.5–1.4)
CRP SERPL-MCNC: 210.1 MG/L (ref 0–8.2)
DIFFERENTIAL METHOD BLD: ABNORMAL
EOSINOPHIL # BLD AUTO: 0.1 K/UL (ref 0–0.5)
EOSINOPHIL NFR BLD: 0.4 % (ref 0–8)
ERYTHROCYTE [DISTWIDTH] IN BLOOD BY AUTOMATED COUNT: 20.4 % (ref 11.5–14.5)
EST. GFR  (NO RACE VARIABLE): >60 ML/MIN/1.73 M^2
EST. GFR  (NO RACE VARIABLE): >60 ML/MIN/1.73 M^2
GLUCOSE SERPL-MCNC: 157 MG/DL (ref 70–110)
GLUCOSE SERPL-MCNC: 99 MG/DL (ref 70–110)
HCO3 UR-SCNC: 24.3 MMOL/L (ref 24–28)
HCO3 UR-SCNC: 24.6 MMOL/L (ref 24–28)
HCT VFR BLD AUTO: 23.3 % (ref 40–54)
HGB BLD-MCNC: 7.6 G/DL (ref 14–18)
IMM GRANULOCYTES # BLD AUTO: 0.37 K/UL (ref 0–0.04)
IMM GRANULOCYTES NFR BLD AUTO: 2 % (ref 0–0.5)
INR PPP: 1.9 (ref 0.8–1.2)
LDH SERPL L TO P-CCNC: 656 U/L (ref 110–260)
LYMPHOCYTES # BLD AUTO: 0.9 K/UL (ref 1–4.8)
LYMPHOCYTES NFR BLD: 4.8 % (ref 18–48)
MAGNESIUM SERPL-MCNC: 1.7 MG/DL (ref 1.6–2.6)
MAGNESIUM SERPL-MCNC: 1.7 MG/DL (ref 1.6–2.6)
MCH RBC QN AUTO: 30.2 PG (ref 27–31)
MCHC RBC AUTO-ENTMCNC: 32.6 G/DL (ref 32–36)
MCV RBC AUTO: 93 FL (ref 82–98)
MONOCYTES # BLD AUTO: 1.1 K/UL (ref 0.3–1)
MONOCYTES NFR BLD: 6.2 % (ref 4–15)
NEUTROPHILS # BLD AUTO: 15.8 K/UL (ref 1.8–7.7)
NEUTROPHILS NFR BLD: 86.3 % (ref 38–73)
NRBC BLD-RTO: 1 /100 WBC
PCO2 BLDA: 35.9 MMHG (ref 35–45)
PCO2 BLDA: 45.5 MMHG (ref 35–45)
PH SMN: 7.33 [PH] (ref 7.35–7.45)
PH SMN: 7.45 [PH] (ref 7.35–7.45)
PHOSPHATE SERPL-MCNC: 1.2 MG/DL (ref 2.7–4.5)
PHOSPHATE SERPL-MCNC: <1 MG/DL (ref 2.7–4.5)
PLATELET # BLD AUTO: 425 K/UL (ref 150–450)
PMV BLD AUTO: 11.3 FL (ref 9.2–12.9)
PO2 BLDA: 26 MMHG (ref 40–60)
PO2 BLDA: 30 MMHG (ref 40–60)
PO2 BLDA: 30 MMHG (ref 40–60)
POC BE: -2 MMOL/L
POC BE: 1 MMOL/L
POC SATURATED O2: 52 % (ref 95–100)
POC SATURATED O2: 54 % (ref 95–100)
POC SATURATED O2: 55 % (ref 95–100)
POC TCO2: 26 MMOL/L (ref 24–29)
POC TCO2: 26 MMOL/L (ref 24–29)
POCT GLUCOSE: 123 MG/DL (ref 70–110)
POCT GLUCOSE: 124 MG/DL (ref 70–110)
POCT GLUCOSE: 131 MG/DL (ref 70–110)
POCT GLUCOSE: 133 MG/DL (ref 70–110)
POCT GLUCOSE: 144 MG/DL (ref 70–110)
POCT GLUCOSE: 151 MG/DL (ref 70–110)
POTASSIUM SERPL-SCNC: 4.2 MMOL/L (ref 3.5–5.1)
POTASSIUM SERPL-SCNC: 4.3 MMOL/L (ref 3.5–5.1)
PROT SERPL-MCNC: 6 G/DL (ref 6–8.4)
PROTHROMBIN TIME: 19.4 SEC (ref 9–12.5)
RBC # BLD AUTO: 2.52 M/UL (ref 4.6–6.2)
SAMPLE: ABNORMAL
SITE: ABNORMAL
SODIUM SERPL-SCNC: 136 MMOL/L (ref 136–145)
SODIUM SERPL-SCNC: 138 MMOL/L (ref 136–145)
WBC # BLD AUTO: 18.26 K/UL (ref 3.9–12.7)

## 2023-12-18 PROCEDURE — 99900035 HC TECH TIME PER 15 MIN (STAT)

## 2023-12-18 PROCEDURE — 94761 N-INVAS EAR/PLS OXIMETRY MLT: CPT | Mod: XB

## 2023-12-18 PROCEDURE — 86140 C-REACTIVE PROTEIN: CPT | Performed by: STUDENT IN AN ORGANIZED HEALTH CARE EDUCATION/TRAINING PROGRAM

## 2023-12-18 PROCEDURE — 25000242 PHARM REV CODE 250 ALT 637 W/ HCPCS: Performed by: NURSE PRACTITIONER

## 2023-12-18 PROCEDURE — 27000646 HC AEROBIKA DEVICE

## 2023-12-18 PROCEDURE — 85610 PROTHROMBIN TIME: CPT | Performed by: PHYSICIAN ASSISTANT

## 2023-12-18 PROCEDURE — 82803 BLOOD GASES ANY COMBINATION: CPT

## 2023-12-18 PROCEDURE — 25000003 PHARM REV CODE 250

## 2023-12-18 PROCEDURE — 99292 CRITICAL CARE ADDL 30 MIN: CPT | Mod: FS,,, | Performed by: NURSE PRACTITIONER

## 2023-12-18 PROCEDURE — 27000221 HC OXYGEN, UP TO 24 HOURS

## 2023-12-18 PROCEDURE — C9113 INJ PANTOPRAZOLE SODIUM, VIA: HCPCS | Performed by: PHYSICIAN ASSISTANT

## 2023-12-18 PROCEDURE — 31574 LARGSC W/NJX AUGMENTATION: CPT | Mod: LT,,, | Performed by: OTOLARYNGOLOGY

## 2023-12-18 PROCEDURE — 85025 COMPLETE CBC W/AUTO DIFF WBC: CPT | Performed by: THORACIC SURGERY (CARDIOTHORACIC VASCULAR SURGERY)

## 2023-12-18 PROCEDURE — 83735 ASSAY OF MAGNESIUM: CPT | Mod: 91

## 2023-12-18 PROCEDURE — 83880 ASSAY OF NATRIURETIC PEPTIDE: CPT | Performed by: STUDENT IN AN ORGANIZED HEALTH CARE EDUCATION/TRAINING PROGRAM

## 2023-12-18 PROCEDURE — 80100008 HC CRRT DAILY MAINTENANCE

## 2023-12-18 PROCEDURE — 85730 THROMBOPLASTIN TIME PARTIAL: CPT | Performed by: PHYSICIAN ASSISTANT

## 2023-12-18 PROCEDURE — 90945 DIALYSIS ONE EVALUATION: CPT

## 2023-12-18 PROCEDURE — 63600175 PHARM REV CODE 636 W HCPCS: Performed by: PHYSICIAN ASSISTANT

## 2023-12-18 PROCEDURE — 99232 SBSQ HOSP IP/OBS MODERATE 35: CPT | Mod: ,,, | Performed by: NURSE PRACTITIONER

## 2023-12-18 PROCEDURE — 25000003 PHARM REV CODE 250: Performed by: THORACIC SURGERY (CARDIOTHORACIC VASCULAR SURGERY)

## 2023-12-18 PROCEDURE — 97535 SELF CARE MNGMENT TRAINING: CPT

## 2023-12-18 PROCEDURE — 20000000 HC ICU ROOM

## 2023-12-18 PROCEDURE — 25000003 PHARM REV CODE 250: Performed by: INTERNAL MEDICINE

## 2023-12-18 PROCEDURE — 27000248 HC VAD-ADDITIONAL DAY

## 2023-12-18 PROCEDURE — 99291 CRITICAL CARE FIRST HOUR: CPT | Mod: FS,,, | Performed by: NURSE PRACTITIONER

## 2023-12-18 PROCEDURE — 93750 INTERROGATION VAD IN PERSON: CPT | Mod: ,,, | Performed by: INTERNAL MEDICINE

## 2023-12-18 PROCEDURE — 97530 THERAPEUTIC ACTIVITIES: CPT

## 2023-12-18 PROCEDURE — 82330 ASSAY OF CALCIUM: CPT | Performed by: STUDENT IN AN ORGANIZED HEALTH CARE EDUCATION/TRAINING PROGRAM

## 2023-12-18 PROCEDURE — 94664 DEMO&/EVAL PT USE INHALER: CPT

## 2023-12-18 PROCEDURE — 80069 RENAL FUNCTION PANEL: CPT | Mod: 91

## 2023-12-18 PROCEDURE — 83615 LACTATE (LD) (LDH) ENZYME: CPT | Performed by: STUDENT IN AN ORGANIZED HEALTH CARE EDUCATION/TRAINING PROGRAM

## 2023-12-18 PROCEDURE — 92526 ORAL FUNCTION THERAPY: CPT

## 2023-12-18 PROCEDURE — 99233 SBSQ HOSP IP/OBS HIGH 50: CPT | Mod: ,,, | Performed by: INTERNAL MEDICINE

## 2023-12-18 PROCEDURE — 97110 THERAPEUTIC EXERCISES: CPT

## 2023-12-18 PROCEDURE — 80076 HEPATIC FUNCTION PANEL: CPT | Performed by: INTERNAL MEDICINE

## 2023-12-18 PROCEDURE — 97116 GAIT TRAINING THERAPY: CPT

## 2023-12-18 PROCEDURE — 25000003 PHARM REV CODE 250: Performed by: STUDENT IN AN ORGANIZED HEALTH CARE EDUCATION/TRAINING PROGRAM

## 2023-12-18 PROCEDURE — 63600175 PHARM REV CODE 636 W HCPCS

## 2023-12-18 RX ORDER — LIDOCAINE HYDROCHLORIDE 10 MG/ML
INJECTION, SOLUTION EPIDURAL; INFILTRATION; INTRACAUDAL; PERINEURAL
Status: COMPLETED
Start: 2023-12-18 | End: 2023-12-18

## 2023-12-18 RX ORDER — BALSAM PERU/CASTOR OIL
OINTMENT (GRAM) TOPICAL 2 TIMES DAILY
Status: DISCONTINUED | OUTPATIENT
Start: 2023-12-19 | End: 2024-01-24 | Stop reason: HOSPADM

## 2023-12-18 RX ORDER — WARFARIN SODIUM 5 MG/1
5 TABLET ORAL DAILY
Status: DISCONTINUED | OUTPATIENT
Start: 2023-12-18 | End: 2023-12-19

## 2023-12-18 RX ORDER — LIDOCAINE HYDROCHLORIDE 10 MG/ML
5 INJECTION INFILTRATION; PERINEURAL ONCE
Status: DISCONTINUED | OUTPATIENT
Start: 2023-12-18 | End: 2023-12-18

## 2023-12-18 RX ORDER — HYDROCODONE BITARTRATE AND ACETAMINOPHEN 500; 5 MG/1; MG/1
TABLET ORAL CONTINUOUS
Status: DISCONTINUED | OUTPATIENT
Start: 2023-12-18 | End: 2023-12-19

## 2023-12-18 RX ORDER — MAGNESIUM SULFATE HEPTAHYDRATE 40 MG/ML
2 INJECTION, SOLUTION INTRAVENOUS
Status: DISCONTINUED | OUTPATIENT
Start: 2023-12-18 | End: 2023-12-19

## 2023-12-18 RX ORDER — LIDOCAINE HYDROCHLORIDE 10 MG/ML
1 INJECTION, SOLUTION EPIDURAL; INFILTRATION; INTRACAUDAL; PERINEURAL ONCE
Status: COMPLETED | OUTPATIENT
Start: 2023-12-18 | End: 2023-12-18

## 2023-12-18 RX ORDER — SODIUM CHLORIDE 9 MG/ML
INJECTION, SOLUTION INTRAVENOUS CONTINUOUS
Status: DISCONTINUED | OUTPATIENT
Start: 2023-12-18 | End: 2024-01-24 | Stop reason: HOSPADM

## 2023-12-18 RX ADMIN — POLYETHYLENE GLYCOL 3350 17 G: 17 POWDER, FOR SOLUTION ORAL at 08:12

## 2023-12-18 RX ADMIN — QUETIAPINE FUMARATE 50 MG: 25 TABLET ORAL at 08:12

## 2023-12-18 RX ADMIN — INSULIN ASPART 7 UNITS: 100 INJECTION, SOLUTION INTRAVENOUS; SUBCUTANEOUS at 12:12

## 2023-12-18 RX ADMIN — MAGNESIUM SULFATE HEPTAHYDRATE 2 G: 40 INJECTION, SOLUTION INTRAVENOUS at 04:12

## 2023-12-18 RX ADMIN — Medication 6 MG: at 08:12

## 2023-12-18 RX ADMIN — SODIUM PHOSPHATE, MONOBASIC, MONOHYDRATE AND SODIUM PHOSPHATE, DIBASIC, ANHYDROUS 39.99 MMOL: 142; 276 INJECTION, SOLUTION INTRAVENOUS at 05:12

## 2023-12-18 RX ADMIN — LIDOCAINE HYDROCHLORIDE 10 MG: 10 INJECTION, SOLUTION EPIDURAL; INFILTRATION; INTRACAUDAL; PERINEURAL at 01:12

## 2023-12-18 RX ADMIN — PANTOPRAZOLE SODIUM 40 MG: 40 INJECTION, POWDER, FOR SOLUTION INTRAVENOUS at 08:12

## 2023-12-18 RX ADMIN — MIDODRINE HYDROCHLORIDE 15 MG: 5 TABLET ORAL at 08:12

## 2023-12-18 RX ADMIN — AMIODARONE HYDROCHLORIDE 400 MG: 200 TABLET ORAL at 08:12

## 2023-12-18 RX ADMIN — LIDOCAINE HYDROCHLORIDE 10 MG: 10 SOLUTION INTRAVENOUS at 01:12

## 2023-12-18 RX ADMIN — INSULIN ASPART 7 UNITS: 100 INJECTION, SOLUTION INTRAVENOUS; SUBCUTANEOUS at 08:12

## 2023-12-18 RX ADMIN — INSULIN ASPART 7 UNITS: 100 INJECTION, SOLUTION INTRAVENOUS; SUBCUTANEOUS at 04:12

## 2023-12-18 RX ADMIN — DEXTROSE MONOHYDRATE, SODIUM CITRATE, AND CITRIC ACID MONOHYDRATE: 2.45; 2.2; .8 INJECTION, SOLUTION INTRAVENOUS at 04:12

## 2023-12-18 RX ADMIN — WARFARIN SODIUM 5 MG: 5 TABLET ORAL at 04:12

## 2023-12-18 RX ADMIN — HEPARIN SODIUM 7 UNITS/KG/HR: 10000 INJECTION, SOLUTION INTRAVENOUS at 08:12

## 2023-12-18 RX ADMIN — MIRTAZAPINE 15 MG: 15 TABLET, FILM COATED ORAL at 08:12

## 2023-12-18 RX ADMIN — SODIUM PHOSPHATE, MONOBASIC, MONOHYDRATE AND SODIUM PHOSPHATE, DIBASIC, ANHYDROUS 39.99 MMOL: 142; 276 INJECTION, SOLUTION INTRAVENOUS at 06:12

## 2023-12-18 RX ADMIN — CALCIUM GLUCONATE: 98 INJECTION, SOLUTION INTRAVENOUS at 08:12

## 2023-12-18 RX ADMIN — PSYLLIUM HUSK 1 PACKET: 3.4 POWDER ORAL at 08:12

## 2023-12-18 RX ADMIN — MAGNESIUM SULFATE HEPTAHYDRATE 2 G: 40 INJECTION, SOLUTION INTRAVENOUS at 05:12

## 2023-12-18 RX ADMIN — SENNOSIDES AND DOCUSATE SODIUM 1 TABLET: 8.6; 5 TABLET ORAL at 08:12

## 2023-12-18 NOTE — PROGRESS NOTES
12/18/2023  Jonathan ANAMARIA Ho Jr    Current provider:  Sandhya Price MD    Device interrogation:      12/18/2023     8:00 AM 12/18/2023     7:00 AM 12/18/2023     6:00 AM 12/18/2023     5:00 AM 12/18/2023     4:00 AM 12/18/2023     3:00 AM 12/18/2023     2:00 AM   TXP LVAD INTERROGATIONS   Type HeartMate3 HeartMate3    HeartMate3    Flow 4 3.9 4.2 4.2 3.9 4.1 3.8   Speed 4900 4900 09650 4900 4900 4900 4900   PI 3.5 4.3 3.3 3.4 4.2 3.1 4.3   Power ([place]) 3.2 3.2 3.3 3.2 3.2 3.2 3.3   LSL 4500 4500 4500 4500 4500 4500 4500   Pulsatility Intermittent pulse Intermittent pulse Intermittent pulse Intermittent pulse Intermittent pulse Intermittent pulse Intermittent pulse          Rounded on [facility] to ensure all mechanical assist device settings (IABP or VAD) were appropriate and all parameters were within limits.  I was able to ensure all back up equipment was present, the staff had no issues, and the Perfusion Department daily rounding was complete.      For implantable VADs: Interrogation of Ventricular assist device was performed with analysis of device parameters and review of device function. I have personally reviewed the interrogation findings and agree with findings as stated.     In emergency, the nursing units have been notified to contact the perfusion department either by:  Calling [phone] from 630am to 4pm Mon thru Fri, utilizing the On-Call Finder functionality of Epic and searching for Perfusion, or by contacting the hospital  from 4pm to 630am and on weekends and asking to speak with the perfusionist on call.    9:22 AM

## 2023-12-18 NOTE — SUBJECTIVE & OBJECTIVE
Interval History: Bedside injection of restylane into left vocal fold, patient tolerated well.    Medications:  Continuous Infusions:   sodium chloride 0.9%      sodium chloride 0.9% Stopped (12/17/23 2300)    sodium chloride 0.9%      calcium gluconate 3 g in dextrose 5 % (D5W) 100 mL infusion      dextrose 10 % in water (D10W)      dextrose-sod citrate-citric ac      EPINEPHrine Stopped (12/13/23 2344)    heparin (porcine) in D5W 7 Units/kg/hr (12/18/23 1500)     Scheduled Meds:   amiodarone  400 mg Per NG tube Daily    insulin aspart U-100  7 Units Subcutaneous 6 times per day    midodrine  15 mg Oral TID    mirtazapine  15 mg Per NG tube Nightly    pantoprazole  40 mg Intravenous Daily    polyethylene glycol  17 g Per NG tube Daily    psyllium husk (aspartame)  1 packet Oral Daily    senna-docusate 8.6-50 mg  1 tablet Per NG tube Daily    warfarin  5 mg Per NG tube Daily     PRN Meds:acetaminophen, albuterol sulfate, bisacodyL, calcium gluconate IVPB, calcium gluconate IVPB, calcium gluconate IVPB, dextrose 10 % in water (D10W), dextrose 10%, dextrose 10%, glucagon (human recombinant), insulin aspart U-100, magnesium hydroxide 400 mg/5 ml, magnesium sulfate IVPB, melatonin, potassium chloride, potassium chloride, potassium chloride in water **AND** [DISCONTINUED] potassium chloride in water **AND** [DISCONTINUED] potassium chloride in water, QUEtiapine, Flushing PICC/Midline Protocol **AND** [DISCONTINUED] sodium chloride 0.9% **AND** sodium chloride 0.9%, sodium phosphate 20.01 mmol in dextrose 5 % (D5W) 250 mL IVPB, sodium phosphate 30 mmol in dextrose 5 % (D5W) 250 mL IVPB, sodium phosphate 39.99 mmol in dextrose 5 % (D5W) 250 mL IVPB     Review of patient's allergies indicates:  No Known Allergies  Objective:     Vital Signs (24h Range):  Temp:  [97.5 °F (36.4 °C)-98.4 °F (36.9 °C)] 98 °F (36.7 °C)  Pulse:  [] 95  Resp:  [18-35] 27  SpO2:  [91 %-97 %] 97 %  BP: (72-86)/(0) 80/0  Arterial Line BP:  (69-93)/(57-83) 82/72     Date 12/18/23 0700 - 12/19/23 0659   Shift 4302-0731 6409-9717 0282-2869 24 Hour Total   INTAKE   I.V.(mL/kg) 312.5(3.6) 35.6(0.4)  348.1(4.1)   NG/ 40  460   IV Piggyback 1847.2 195.7  2043   Shift Total(mL/kg) 2579.7(30.1) 271.3(3.2)  2851(33.3)   OUTPUT   Other 2661 668  3329   Shift Total(mL/kg) 2661(31) 668(7.8)  3329(38.8)   Weight (kg) 85.7 85.7 85.7 85.7     Lines/Drains/Airways       Peripherally Inserted Central Catheter Line  Duration             PICC Triple Lumen 12/05/23 1208 right basilic 13 days              Central Venous Catheter Line  Duration             Trialysis (Dialysis) Catheter 12/10/23 1215 right internal jugular 8 days              Drain  Duration                  NG/OG Tube 12/14/23 1041 Left nostril 4 days              Arterial Line  Duration             Arterial Line 12/17/23 1400 Left Brachial 1 day              Line  Duration                  VAD 12/01/23 1015 Left ventricular assist device HeartMate 3 17 days                     Physical Exam  Laryngeal landmarks easily palpable  Central line to right neck  Currently on CRRT  LVAD in place     Significant Labs:  CBC:   Recent Labs   Lab 12/18/23 0417   WBC 18.26*   RBC 2.52*   HGB 7.6*   HCT 23.3*      MCV 93   MCH 30.2   MCHC 32.6     Coagulation:   Recent Labs   Lab 12/18/23 0417   LABPROT 19.4*   INR 1.9*   APTT 48.3*       Significant Diagnostics:  I have reviewed and interpreted all pertinent imaging results/findings within the past 24 hours.

## 2023-12-18 NOTE — ASSESSMENT & PLAN NOTE
- suspect acute tubular injury secondary to hypotension/cardiogenic shock likely compounded by intra-arterial contrast and anemia with urinary sediment with granular casts  - Continue SLED with citrate, -300.  - Q8H RFP, magnesium and ionized calcium per SLED protocol with citrate  - renal diet/tube feeds when not NPO, volume restriction per primary team  - strict I/O's and daily weights  - keep MAP > 65 mmHg  - renally all dose medications to eGFR  - avoid nephrotoxic agents wean feasible (i.e. NSAIDs, intra-arterial contrast, supra-therapeutic vancomycin levels, etc.)

## 2023-12-18 NOTE — PT/OT/SLP PROGRESS
"Speech Language Pathology Treatment    Patient Name:  Radha Abbott   MRN:  [id number]  Admitting Diagnosis: LVAD (left ventricular assist device) present    Recommendations:                 General Recommendations:  ENT evaluation, Dysphagia therapy, and Modified barium swallow study  Diet recommendations:  NPO, Liquid Diet Level: NPO   Aspiration Precautions: Continue alternate means of nutrition and Strict aspiration precautions   General Precautions: Standard, LVAD, fall, sternal  Communication strategies:  provide increased time to answer    Assessment:     Radha Abbott is a 63 y.o. male with an SLP diagnosis of Dysphagia and aphonia further complicated by known left true vocal fold immobility.     Subjective     "Tell my nurse to stop doing all the things they ask her to do" - Pt reporting frustrating with turning schedule etc. SP offered support  within SLP scope re: importance of turning and routine RN care within the ICU     Awake/alert  RN at the bedside     Pain/Comfort:  Pain Rating 1: 0/10  Pain Rating Post-Intervention 1: 0/10      Objective:     Has the patient been evaluated by SLP for swallowing?   Yes  Keep patient NPO? Yes     Per discussion with RN and medical team pt pending true vocal fold augmentation later this PM. Upon arrival, pt remains with wet  and essentially aphonic vocal quality upon arrival. Cough weak and unproductive. Per ENT assessment 12/14: The left vocal fold is immobile with glottic gap on phonation. Pooling of secretions in hypopharynx with aspiration of secretions into trachea was observed. NG tube remains in place. SLP reviewed with pt SLP role s/p true vocal fold augmentation as well as laryngeal anatomy and physiology and impact on swallow function within SLP scope. SLP discussed at times pt with true vocal fold rest up to 24 hours post injection and ENT service will assist in dictating readiness to resume phonation and PO trials. SLP encouraged pt to continue with  " independent swallow exercises with ice chips. Swallow exercises written on whiteboard. SLP reviewed with pt exercises. Pt demonstrated ability to execute chin tuck against resistance x2, effortful swallow x3 and akilah manuever x1 with with SLP mod-max cues and model with fair ability. PO trials remain pending ENT intervention. Speech to continue to follow.     Goals:   Multidisciplinary Problems       SLP Goals          Problem: SLP    Goal Priority Disciplines Outcome   SLP Goal     SLP    Description: Speech Language Pathology Goals  Goals expected to be met by 12/24    1. Pt will participate in ongoing assessment of swallow function to help determine least restrictive diet                            Plan:     Patient to be seen:  4 x/week   Plan of Care reviewed with:  patient, spouse   SLP Follow-Up:  Yes       Discharge recommendations:    TBD      Time Tracking:     SLP Treatment Date:   12/18/23  Speech Start Time:  0903  Speech Stop Time:  0914     Speech Total Time (min):  11 min    Billable Minutes: Treatment Swallowing Dysfunction 11    12/18/2023

## 2023-12-18 NOTE — CARE UPDATE
Hemodynamics     8PM    CVP: 8  SCVO2: 59  Cardiac Output: 8.3  Cardiac Index: 3.9  SVR: 636    Continuous Infusions:   sodium chloride 0.9%      sodium chloride 0.9% 5 mL/hr at 12/17/23 1700    calcium gluconate 30 g in dextrose 5 % (D5W) 1,000 mL infusion 30 mL/hr at 12/17/23 2100    dextrose 10 % in water (D10W)      dextrose-sod citrate-citric ac 200 mL/hr at 12/17/23 2100    EPINEPHrine Stopped (12/13/23 2344)    heparin (porcine) in D5W 7 Units/kg/hr (12/17/23 2100)       Intake/Output Summary (Last 24 hours) at 12/17/2023 2131  Last data filed at 12/17/2023 2100  Gross per 24 hour   Intake 5954.68 ml   Output 6601 ml   Net -646.32 ml         Recommendation:   -- Continue current care.     Case discussed with on call attending.    Ravi Mcdonald MD  Cardiology Fellow, PGY4

## 2023-12-18 NOTE — PROGRESS NOTES
[facility] - Surgical Intensive Care  Otorhinolaryngology-Head & Neck Surgery  Progress Note    Subjective:     Post-Op Info:  Procedure(s) (LRB):  CLOSURE, WOUND, STERNUM (N/A)  INSERTION, GRAFT, PERICARDIUM  DRAINAGE, PLEURAL EFFUSION   14 Days Post-Op  Hospital Day: 40     Interval History: Bedside injection of restylane into left vocal fold, patient tolerated well.    Medications:  Continuous Infusions:   sodium chloride 0.9%      sodium chloride 0.9% Stopped (12/17/23 2300)    sodium chloride 0.9%      calcium gluconate 3 g in dextrose 5 % (D5W) 100 mL infusion      dextrose 10 % in water (D10W)      dextrose-sod citrate-citric ac      EPINEPHrine Stopped (12/13/23 2344)    heparin (porcine) in D5W 7 Units/kg/hr (12/18/23 1500)     Scheduled Meds:   amiodarone  400 mg Per NG tube Daily    insulin aspart U-100  7 Units Subcutaneous 6 times per day    midodrine  15 mg Oral TID    mirtazapine  15 mg Per NG tube Nightly    pantoprazole  40 mg Intravenous Daily    polyethylene glycol  17 g Per NG tube Daily    psyllium husk (aspartame)  1 packet Oral Daily    senna-docusate 8.6-50 mg  1 tablet Per NG tube Daily    warfarin  5 mg Per NG tube Daily     PRN Meds:acetaminophen, albuterol sulfate, bisacodyL, calcium gluconate IVPB, calcium gluconate IVPB, calcium gluconate IVPB, dextrose 10 % in water (D10W), dextrose 10%, dextrose 10%, glucagon (human recombinant), insulin aspart U-100, magnesium hydroxide 400 mg/5 ml, magnesium sulfate IVPB, melatonin, potassium chloride, potassium chloride, potassium chloride in water **AND** [DISCONTINUED] potassium chloride in water **AND** [DISCONTINUED] potassium chloride in water, QUEtiapine, Flushing PICC/Midline Protocol **AND** [DISCONTINUED] sodium chloride 0.9% **AND** sodium chloride 0.9%, sodium phosphate 20.01 mmol in dextrose 5 % (D5W) 250 mL IVPB, sodium phosphate 30 mmol in dextrose 5 % (D5W) 250 mL IVPB, sodium phosphate 39.99 mmol in dextrose 5 % (D5W) 250 mL IVPB      Review of patient's allergies indicates:  No Known Allergies  Objective:     Vital Signs (24h Range):  Temp:  [97.5 °F (36.4 °C)-98.4 °F (36.9 °C)] 98 °F (36.7 °C)  Pulse:  [] 95  Resp:  [18-35] 27  SpO2:  [91 %-97 %] 97 %  BP: (72-86)/(0) 80/0  Arterial Line BP: (69-93)/(57-83) 82/72     Date 12/18/23 0700 - 12/19/23 0659   Shift 6879-2948 6228-6512 3425-0673 24 Hour Total   INTAKE   I.V.(mL/kg) 312.5(3.6) 35.6(0.4)  348.1(4.1)   NG/ 40  460   IV Piggyback 1847.2 195.7  2043   Shift Total(mL/kg) 2579.7(30.1) 271.3(3.2)  2851(33.3)   OUTPUT   Other 2661 668  3329   Shift Total(mL/kg) 2661(31) 668(7.8)  3329(38.8)   Weight (kg) 85.7 85.7 85.7 85.7     Lines/Drains/Airways       Peripherally Inserted Central Catheter Line  Duration             PICC Triple Lumen 12/05/23 1208 right basilic 13 days              Central Venous Catheter Line  Duration             Trialysis (Dialysis) Catheter 12/10/23 1215 right internal jugular 8 days              Drain  Duration                  NG/OG Tube 12/14/23 1041 Left nostril 4 days              Arterial Line  Duration             Arterial Line 12/17/23 1400 Left Brachial 1 day              Line  Duration                  VAD 12/01/23 1015 Left ventricular assist device HeartMate 3 17 days                     Physical Exam  Laryngeal landmarks easily palpable  Central line to right neck  Currently on CRRT  LVAD in place     Significant Labs:  CBC:   Recent Labs   Lab 12/18/23 0417   WBC 18.26*   RBC 2.52*   HGB 7.6*   HCT 23.3*      MCV 93   MCH 30.2   MCHC 32.6     Coagulation:   Recent Labs   Lab 12/18/23 0417   LABPROT 19.4*   INR 1.9*   APTT 48.3*       Significant Diagnostics:  I have reviewed and interpreted all pertinent imaging results/findings within the past 24 hours.  Assessment/Plan:     Vocal cord paralysis, unilateral complete  The patient is a 63 year old male with complex cardiac history including heart failure with recent LVAD placement.  He was intubated during this recent admission and has developed left vocal fold immobility causing dysphonia and aspiration of secretions. He is now s/p bedside injection of Restylane into left vocal fold.    Voice rest for rest of day today  Okay to continue PO trials per SLP  Follow up outpatient            Maxine Gonsales MD  Otorhinolaryngology-Head & Neck Surgery  [facility] - Surgical Intensive Care

## 2023-12-18 NOTE — PROGRESS NOTES
[facility]  Heart Transplant  Progress Note    Patient Name: Radha Abbott  MRN: [id number]  Admission Date: 11/9/2023  Hospital Length of Stay: 39 days  Attending Physician: Sandhya Price MD  Primary Care Provider: Vasu Kong MD  Principal Problem:LVAD (left ventricular assist device) present    Subjective:   Interval History: Sitting on EOB earlier this morning. Remains anuric. On continuous SLED with CVP 9. Labs are stable, transaminases trending down. sVO2 55 with CO/CI 7.54/3.60 and . ENT augmented paralyzed left vocal cord and rec that he rest his vocal cords for 24 hours. Tolerating tube feeds at goal but will ask Dietician to increase caloric intake to 2500 ikm every 24 hours.     Continuous Infusions:   sodium chloride 0.9% 5 mL/hr at 12/15/23 1100    calcium gluconate 30 g in dextrose 5 % (D5W) 1,000 mL infusion      dextrose 10 % in water (D10W)      dextrose-sod citrate-citric ac      EPINEPHrine Stopped (12/13/23 2089)    heparin (porcine) in D5W 9 Units/kg/hr (12/15/23 1100)     Scheduled Meds:   amiodarone  400 mg Per NG tube Daily    atorvastatin  40 mg Per NG tube Daily    insulin aspart U-100  9 Units Subcutaneous 6 times per day    midodrine  15 mg Oral TID    mirtazapine  15 mg Per NG tube Nightly    pantoprazole  40 mg Intravenous Daily    polyethylene glycol  17 g Per NG tube Daily    psyllium husk (aspartame)  1 packet Oral Daily    senna-docusate 8.6-50 mg  1 tablet Per NG tube Daily    warfarin  4 mg Per NG tube Daily     PRN Meds:acetaminophen, albuterol sulfate, bisacodyL, dextrose 10 % in water (D10W), dextrose 10%, dextrose 10%, glucagon (human recombinant), insulin aspart U-100, magnesium hydroxide 400 mg/5 ml, magnesium sulfate IVPB, melatonin, potassium chloride, potassium chloride, QUEtiapine, Flushing PICC/Midline Protocol **AND** [DISCONTINUED] sodium chloride 0.9% **AND** sodium chloride 0.9%, sodium phosphate 20.01 mmol in dextrose 5 %  (D5W) 250 mL IVPB, sodium phosphate 30 mmol in dextrose 5 % (D5W) 250 mL IVPB, sodium phosphate 39.99 mmol in dextrose 5 % (D5W) 250 mL IVPB    Review of patient's allergies indicates:  No Known Allergies  Objective:     Vital Signs (Most Recent):  Temp: 97.8 °F (36.6 °C) (12/15/23 1128)  Pulse: 91 (12/15/23 1130)  Resp: 20 (12/15/23 1130)  BP: (!) 70/0 (12/15/23 1128)  SpO2: 97 % (12/15/23 1128) Vital Signs (24h Range):  Temp:  [97.7 °F (36.5 °C)-98.5 °F (36.9 °C)] 97.8 °F (36.6 °C)  Pulse:  [] 91  Resp:  [15-26] 20  SpO2:  [96 %-100 %] 97 %  BP: (70-76)/(0) 70/0  Arterial Line BP: (66-87)/(50-81) 75/59     Patient Vitals for the past 72 hrs (Last 3 readings):   Weight   12/15/23 0600 78 kg (171 lb 15.3 oz)   12/14/23 0445 78.9 kg (174 lb)   12/13/23 0228 78.5 kg (173 lb)     Body mass index is 23.32 kg/m².      Intake/Output Summary (Last 24 hours) at 12/15/2023 1202  Last data filed at 12/15/2023 1100  Gross per 24 hour   Intake 1219.08 ml   Output --   Net 1219.08 ml       Hemodynamic Parameters:       Telemetry: SR       Physical Exam  Constitutional:       Comments: Cachectic, temporal wasting   HENT:      Head: Normocephalic and atraumatic.   Eyes:      Conjunctiva/sclera: Conjunctivae normal.      Pupils: Pupils are equal, round, and reactive to light.   Neck:      Comments: RIJ trialysis line. Do not appreciate elevated JVP  Cardiovascular:      Rate and Rhythm: Normal rate and regular rhythm.      Comments: Smooth VAD hum  Pulmonary:      Effort: Pulmonary effort is normal.      Breath sounds: Normal breath sounds.   Abdominal:      General: Bowel sounds are normal.      Palpations: Abdomen is soft.   Musculoskeletal:         General: No swelling. Normal range of motion.   Skin:     General: Skin is warm and dry.      Capillary Refill: Capillary refill takes 2 to 3 seconds.   Neurological:      General: No focal deficit present.      Mental Status: He is alert and oriented to person, place, and time.             Significant Labs:  CBC:  Recent Labs   Lab 12/13/23  0316 12/13/23  0825 12/14/23  0258 12/14/23  1538 12/15/23  0305   WBC 26.53*  --  19.65*  --  18.42*   RBC 2.32*  --  2.20*  --  2.21*   HGB 6.8*  --  6.4*  --  6.5*   HCT 21.0*   < > 19.7* 17* 20.6*     --  416  --  458*   MCV 91  --  90  --  93   MCH 29.3  --  29.1  --  29.4   MCHC 32.4  --  32.5  --  31.6*    < > = values in this interval not displayed.     BNP:  Recent Labs   Lab 12/11/23  0405 12/13/23  0316 12/15/23  0311   BNP 2,154* 1,460* 1,312*     CMP:  Recent Labs   Lab 12/13/23  1700 12/14/23  0258 12/14/23  0801 12/14/23  1538 12/14/23  2147 12/15/23  0305   *  --    < > 119* 118* 151*   CALCIUM 8.3*  --    < > 7.5* 8.0* 8.3*   ALBUMIN 2.0* 2.0*  --   --   --  1.9*   PROT 6.3 6.3  --   --   --  5.9*   *  --    < > 138 137 137   K 4.1  --    < > 3.4* 3.5 3.5   CO2 20*  --    < > 19* 22* 22*     --    < > 108 104 103   BUN 10  --    < > 31* 40* 44*   CREATININE 0.9  --    < > 2.1* 2.5* 3.0*   ALKPHOS 172* 198*  --   --   --  165*   ALT 22 20  --   --   --  24   AST 41* 40  --   --   --  51*   BILITOT 1.6* 1.6*  --   --   --  1.3*    < > = values in this interval not displayed.      Coagulation:   Recent Labs   Lab 12/13/23  1130 12/14/23  0258 12/14/23  0801 12/15/23  0305 12/15/23  1059   INR 1.4*  --  1.6* 1.8*  --    APTT  --  50.7*  --  71.2* 60.5*     LDH:  Recent Labs   Lab 12/13/23  0316 12/14/23  0258 12/15/23  0305   * 565* 577*     Microbiology:  Microbiology Results (last 7 days)       Procedure Component Value Units Date/Time    Blood culture [[id number]] Collected: 12/10/23 1240    Order Status: Completed Specimen: Blood from Peripheral, Antecubital, Right Updated: 12/14/23 2012     Blood Culture, Routine No Growth to date      No Growth to date      No Growth to date      No Growth to date      No Growth to date    Blood culture [[id number]] Collected: 12/10/23 1149    Order Status: Completed  Specimen: Blood from Line, Jugular, External Right Updated: 12/14/23 2012     Blood Culture, Routine No Growth to date      No Growth to date      No Growth to date      No Growth to date      No Growth to date    Culture, Respiratory with Gram Stain [[id number]] Collected: 12/11/23 1643    Order Status: Completed Specimen: Respiratory from Sputum, Expectorated Updated: 12/13/23 0954     Respiratory Culture Normal respiratory bobby      No S aureus or Pseudomonas isolated.     Gram Stain (Respiratory) <10 epithelial cells per low power field.     Gram Stain (Respiratory) Rare WBC's     Gram Stain (Respiratory) Rare Gram positive cocci    Fungus culture [[id number]] Collected: 12/06/23 1532    Order Status: Completed Specimen: Body Fluid from Lung, Right Updated: 12/12/23 0915     Fungus (Mycology) Culture Culture in progress    Narrative:      Bronchial Wash    Clostridium difficile EIA [[id number]] Collected: 12/12/23 0110    Order Status: Completed Specimen: Stool Updated: 12/12/23 0418     C. diff Antigen Negative     C difficile Toxins A+B, EIA Negative     Comment: Testing not recommended for children <24 months old.               I have reviewed all pertinent labs within the past 24 hours.    Estimated Creatinine Clearance: 28.4 mL/min (A) (based on SCr of 3 mg/dL (H)).    Diagnostic Results:  I have reviewed and interpreted all pertinent imaging results/findings within the past 24 hours.  Assessment and Plan:     63 year old male with hx of CAD s/p 3v CABG (unclear anatomy, 2009), ICM with a recent EF of 15-20% s/p ICD (medtronik 2009), DM2 (a1c 7.7), HTN, HLD, Vfib on amio who presents to the ED with CC of SOB     Pt was recently admitted to [facility] as a transfer.  He was started on a Lasix gtt and did well.  Started on gDMT and discharged home on  5 with plans to follow up in HTS clinic for ongoing transplant evaluation at another facility.  He says that about a few days ago he started to notice LE swelling.   This turned into Nelson and orthopnea.  He says he can't walk to the bathroom without getting SOB.  He also complains of weight gain.  He takes torsemide 20mg BID at home and was told to trial additional Lasix which he did without any improvement.  He was rx metolazone but has not been filled.  He came to the ED     In the ED he was AF with stable VS on RA.  CBC showing chronic anemia.  CMP notable for acute on chronic CKD with baseline around 2.1 and Cr now 2.6.  BNP elevated.  Lactate neg.  CXR showing pulmonary edema.  I evaluated the pt at the bedside.  Bedside TTE showing CVP >15.  He was subsequently admitted to the CCU for diuresis.     He was continued on his home  and was started on a lasix gtt, which he responded well to overnight (net -1700cc. He feels much better this morning as well. Our transplant coordinators have been working on insurance approval and he is now being transferred to [facility] service for transplant evaluation.     * LVAD (left ventricular assist device) present  -HeartMate 3 Implanted  12/1/2023  as DT  -HTS Primary  -Implanted by Dr. Washington  -Continue Coumadin, dosing by PharmD.  Goal INR 2.0-3.0 . Subtherapeutic today. On Heparin gtt   -Antiplatelets Not on ASA  -LDH is stable overall today. Will continue to monitor daily.  -Speed set at  4900   rpm, LSL 4500 rpm   -Interrogation notable for no events  -ECHO 12/11 AV does not open, IVS midline, LVEDD 6.1, with HM3 speed set to 4900 rpm  -Not listed for OHTx, declined for OHTX due to comorbidities         Procedure: Device Interrogation Including analysis of device parameters  Current Settings: Ventricular Assist Device  Review of device function is stable/unstable stable        12/15/2023    10:00 AM 12/15/2023     8:00 AM 12/15/2023     7:00 AM 12/15/2023     6:00 AM 12/15/2023     5:00 AM 12/15/2023     4:00 AM 12/15/2023     3:00 AM   TXP LVAD INTERROGATIONS   Type HeartMate3  HeartMate3 HeartMate3 HeartMate3 HeartMate3 HeartMate3    Flow 4.2 4.2 4.1 4.3 4.3 4.3 4.2   Speed 4900 4900 4900 4900 4900 4900 4900   PI 3.7 3.7 3.4 3.2 3.1 3.2 3.4   Power (Carrington) 3.3 3.3 3.3 3.2 3.3 3.3 3.2   LSL 4500 4500 4500 4500 4500 4500 4500   Pulsatility No Pulse No Pulse Intermittent pulse Intermittent pulse Intermittent pulse Intermittent pulse Intermittent pulse         Vocal cord paralysis, unilateral complete  -Appreciate ENT's help  -Messaged ENT to see if paralyzed left vocal cord could be augmented    Dysphonia  -Speech following closely  -Appreciate ENT's help. Messaged ENT to see if they could augment paralyzed left vocal cord as he is currently unable to progress with SLP    Moderate malnutrition  -RD and SLP following  -Tolerating     Leukocytosis  -WBC peaked at 33k, trending down at 18k. Afebrile  -Concern for septic shock. Was also on hydrocortisone with increasing pressor requirements, now discontinued   -ID consulted, started on empiric micafungin, meropenem, and vanc  -Cultures remain NGTD  -Completed course for possible pneumonia, will discontinue all antibiotics, ID signed off     Adjustment disorder with anxiety  -Starting Remeron 15 mg q HS    Depressed mood  -Psychiatry consulted for depressed mood, SI when left alone  -Recommend sitter when alone (possibly with transition to stepdown).  -Starting Remeron      IVÁN (acute kidney injury)  -Nephrology following      Altered mental status  -multifactorial  -likely metabolic encephalopathy +/- icu delirium   -CT 12/7 negative for acute process  -Continue RRT for metabolic clearance as needed  -EEG done over the weekend no seizures detected, just generalized slowing  -provide frequent re-orientation  -Promote sleep/wake cycle   -Neurology consulted   - Much more awake, continue to encourage and motivate    Acute on chronic combined systolic and diastolic CHF, NYHA class 4  Pt with known ICM with an EF of 25% on home  presented with decompensated HF.      -lRHC 11/14 RA 14, PA 70/35, PCWP  32, CO 4.1, CI 2.1  -Repeat RHC 11/20 RA 20, PA 60/29 (39), PCWP 29, CO 4.6, CI 2.3  -ECHO 11/21 showed EF 15-20%, mild RV dysfunction, mild MR, LVED 6.9   -Home  5   -Home GDMT: Entreso 24/26 BID (on hold 2/2 IVÁN), hydralazine 25 q 8hr, all held prior to LVAD implant  -Current GDMT: Continue Hyd/Isordil, Farxiga. Will transition Valsartan to Entresto and add Aldactone  -Home diuretic: Lasix 40 mg BID   -IABP inserted 11/21 and underwent DT HM3 implant 12/1, chest closed 12/4  -LVAD speed increased to 4900 rpm on 12/7   -ECHO 12/11 AV does not open, IVS midline, LVEDD 6.1, with HM3 speed set to 4900 rpm  -Increased pressor requirements with Giapreza, Ketty, epi, and vaso 12/11. Able to wean off giapreza overnight 12/11. He has been weaned off all pressors and inotropes  -CVP 7, SVO2 45%, CO 6.14, CI 3.08,   -Remains on midodrine 15 TID      PAF (paroxysmal atrial fibrillation)  Known hx of pAF. In sinus rhythm on admission, but 1 run of AF RVR overnight. He spontaneously converted.  - Continue home amiodarone 400mg qd  - A/c per primary team      Acute renal failure with acute tubular necrosis superimposed on stage 3b chronic kidney disease  IVÁN on CKD2. Baseline Cr of 1.7-2.0.   - Hold ARNi, entresto  -Trend BMPs daily   -Nephrology following   -SLED/SCUF for volume removal began on 12/1, on hold since 12/13/23  -Did not respond to diuretic challenge with 240 mg IV Lasix, 1000 mg Diuril, and 500 mg IV Diamox done 12/14  -Midodrine 15 mg TID     Type 2 diabetes mellitus without complication, without long-term current use of insulin  -A1c 7.6, not insulin dependent  - MDSSI  -Currently on Insulin gtt   - Endocrine following, appreciate assistance with blood glucose management    CAD (coronary artery disease)  -S/p 3vCABG in 2009  - home ASA, Lipitor    Ventricular tachycardia  Hx VT s/p ICD placement (medtronic 2009)  - Continue home amio 400mg qd      Uninterrupted Critical Care/Counseling Time (not  including procedures): 65 minutes      Fany Abbasi NP [id number]  Heart Transplant  [facility]

## 2023-12-18 NOTE — PROGRESS NOTES
[facility]  Endocrinology  Progress Note    Admit Date: 11/9/2023     Reason for Consult: Management of T2DM, Hyperglycemia     Surgical Procedure and Date: n/a    Diabetes diagnosis year: 1998    Home Diabetes Medications:  Metformin (off since October)   Lab Results   Component Value Date    HGBA1C 7.6 (H) 10/12/2023       How often checking glucose at home?  Once daily in the AM    BG readings on regimen: 150-160s  Hypoglycemia on the regimen?  No  Missed doses on regimen?  n/a    Diabetes Complications include:     Hyperglycemia    Complicating diabetes co morbidities:   CAD s/p CABG, HTN, HLD      HPI:   Patient is a 63 y.o. male with a diagnosis of CAD s/p 3v CABG (unclear anatomy, 2009), ICM with a recent EF of 15-20% s/p ICD (medtronik 2009), DM2 (a1c 7.7), HTN, HLD, Vfib on amio who presents to the ED with CC of SOB. In the ED he was AF with stable VS on RA.  CBC showing chronic anemia.  CMP notable for acute on chronic CKD with baseline around 2.1 and Cr now 2.6.  BNP elevated.  Lactate neg.  CXR showing pulmonary edema. He was subsequently admitted to the CCU for diuresis.  Endocrinology consulted for management of T2DM.          Interval HPI:   Overnight events: No acute events overnight. Patient in room 39315/94675 A. Blood glucose stable. BG at goal on current insulin regimen (SSI ). Steroid use- None. 14 Days Post-Op  Renal function- Normal   Vasopressors-  Epinephrine       Endocrine will continue to follow and manage insulin orders inpatient.         Diet NPO Except for: Medication (per NG tube)     Eating:   NPO  Nausea: No  Hypoglycemia and intervention: No  Fever: No  TPN and/or TF: Yes  If yes, type of TF/TPN and rate: TF @ 40 ml/hr    BP (!) 76/0 (BP Location: Right arm, Patient Position: Lying)   Pulse 96   Temp 98 °F (36.7 °C) (Oral)   Resp (!) 28   Ht 6' (1.829 m)   Wt 85.7 kg (189 lb)   SpO2 (!) 94%   BMI 25.63 kg/m²     Labs Reviewed and Include    Recent  "Labs   Lab 12/18/23  0417   *   CALCIUM 8.0*   ALBUMIN 1.7*  1.7*   PROT 6.0      K 4.3   CO2 22*      BUN 6*   CREATININE 0.6   ALKPHOS 245*   ALT 32   AST 91*   BILITOT 1.3*     Lab Results   Component Value Date    WBC 18.26 (H) 12/18/2023    HGB 7.6 (L) 12/18/2023    HCT 23.3 (L) 12/18/2023    MCV 93 12/18/2023     12/18/2023     No results for input(s): "TSH", "FREET4" in the last 168 hours.  Lab Results   Component Value Date    HGBA1C 7.6 (H) 10/12/2023       Nutritional status:   Body mass index is 25.63 kg/m².  Lab Results   Component Value Date    ALBUMIN 1.7 (L) 12/18/2023    ALBUMIN 1.7 (L) 12/18/2023    ALBUMIN 1.8 (L) 12/17/2023     Lab Results   Component Value Date    PREALBUMIN 10 (L) 12/15/2023    PREALBUMIN 12 (L) 12/09/2023    PREALBUMIN 19 (L) 12/01/2023       Estimated Creatinine Clearance: 141.9 mL/min (based on SCr of 0.6 mg/dL).    Accu-Checks  Recent Labs     12/16/23  2355 12/17/23  0346 12/17/23  0855 12/17/23  0856 12/17/23  1425 12/17/23  1618 12/17/23  2000 12/18/23  0025 12/18/23  0416 12/18/23  0805   POCTGLUCOSE 110 105 320* 158* 181* 192* 142* 123* 151* 144*       Current Medications and/or Treatments Impacting Glycemic Control  Immunotherapy:    Immunosuppressants       None          Steroids:   Hormones (From admission, onward)      Start     Stop Route Frequency Ordered    12/13/23 2200  melatonin tablet 6 mg         -- Oral Nightly PRN 12/13/23 2100          Pressors:    Autonomic Drugs (From admission, onward)      Start     Stop Route Frequency Ordered    12/10/23 1045  EPINEPHrine 5 mg in dextrose 5% 250 mL infusion (premix)         -- IV Continuous 12/10/23 0943    12/09/23 2100  midodrine tablet 15 mg         -- Oral 3 times daily 12/09/23 5059          Hyperglycemia/Diabetes Medications:   Antihyperglycemics (From admission, onward)      Start     Stop Route Frequency Ordered    12/17/23 1200  insulin aspart U-100 pen 7 Units         -- SubQ 6 " times per day 12/17/23 0831    12/11/23 1027  insulin aspart U-100 pen 0-10 Units         -- SubQ Every 4 hours PRN 12/11/23 0927            ASSESSMENT and PLAN    Cardiac/Vascular  * LVAD (left ventricular assist device) present  Optimize BG control to improve wound healing        PAF (paroxysmal atrial fibrillation)  May increase insulin resistance.         Acute on chronic combined systolic and diastolic CHF, NYHA class 4  Managed per primary team  Optimize BG control  S/p LVAD     Renal/  Acute renal failure with acute tubular necrosis superimposed on stage 3b chronic kidney disease  Lab Results   Component Value Date    CREATININE 0.6 12/18/2023     Avoid insulin stacking  Titrate insulin slowly       Endocrine  Type 2 diabetes mellitus without complication, without long-term current use of insulin  BG goal 140-180.      - Novolog 7 units with scheduled TF. (Hold if TF is stopped or if BG < 100 mg/dL);   - BG checks q4hr while on TF/NPO   - Novolog (aspart) insulin prn for BG excursions [facility] (150/25)   - Hypoglycemia protocol in place    ** Please notify Endocrine for any change and/or advance in diet**  ** Please call Endocrine for any BG related issues **    Discharge Planning:   TBD. Please notify endocrinology prior to discharge.                 Erasmo Parrish, DNP, FNP  Endocrinology  [facility]

## 2023-12-18 NOTE — PROCEDURES
Radha Abbott is a 63 y.o. male patient.    Temp: 98 °F (36.7 °C) (12/18/23 1500)  Pulse: 95 (12/18/23 1545)  Resp: (!) 27 (12/18/23 1545)  BP: (!) 80/0 (12/18/23 1500)  SpO2: 97 % (12/18/23 1423)  Weight: 85.7 kg (189 lb) (12/18/23 0500)  Height: 6' (182.9 cm) (12/11/23 0730)    Procedures        12/18/2023     4:00 PM 12/18/2023    12:00 PM 12/18/2023    11:00 AM 12/18/2023    10:00 AM 12/18/2023     9:00 AM 12/18/2023     8:00 AM 12/18/2023     7:00 AM   TXP LVAD INTERROGATIONS   Type HeartMate3 HeartMate3 HeartMate3 HeartMate3 HeartMate3 HeartMate3 HeartMate3   Flow 4 4.3 4.1 3.9 4 4 3.9   Speed 4900 4900 4900 4900 4900 4900 4900   PI 4.2 3.1 3.4 4.2 4.1 3.5 4.3   Power (Carrington) 3.3 3.3 3.3 3.2 3.2 3.2 3.2   LSL 4500 4500 4500 4500 4500 4500 4500   Pulsatility Intermittent pulse  Intermittent pulse   Intermittent pulse Intermittent pulse     Interrogation of Ventricular assist device was performed with physician analysis of device parameters and review of device function. I have personally reviewed the interrogation findings and agree with findings as stated.       12/18/2023

## 2023-12-18 NOTE — SUBJECTIVE & OBJECTIVE
"Interval HPI:   Overnight events: No acute events overnight. Patient in room 11773/99501 A. Blood glucose stable. BG at goal on current insulin regimen (SSI ). Steroid use- None. 14 Days Post-Op  Renal function- Normal   Vasopressors-  Epinephrine       Endocrine will continue to follow and manage insulin orders inpatient.         Diet NPO Except for: Medication (per NG tube)     Eating:   NPO  Nausea: No  Hypoglycemia and intervention: No  Fever: No  TPN and/or TF: Yes  If yes, type of TF/TPN and rate: TF @ 40 ml/hr    BP (!) 76/0 (BP Location: Right arm, Patient Position: Lying)   Pulse 96   Temp 98 °F (36.7 °C) (Oral)   Resp (!) 28   Ht 6' (1.829 m)   Wt 85.7 kg (189 lb)   SpO2 (!) 94%   BMI 25.63 kg/m²     Labs Reviewed and Include    Recent Labs   Lab 12/18/23  0417   *   CALCIUM 8.0*   ALBUMIN 1.7*  1.7*   PROT 6.0      K 4.3   CO2 22*      BUN 6*   CREATININE 0.6   ALKPHOS 245*   ALT 32   AST 91*   BILITOT 1.3*     Lab Results   Component Value Date    WBC 18.26 (H) 12/18/2023    HGB 7.6 (L) 12/18/2023    HCT 23.3 (L) 12/18/2023    MCV 93 12/18/2023     12/18/2023     No results for input(s): "TSH", "FREET4" in the last 168 hours.  Lab Results   Component Value Date    HGBA1C 7.6 (H) 10/12/2023       Nutritional status:   Body mass index is 25.63 kg/m².  Lab Results   Component Value Date    ALBUMIN 1.7 (L) 12/18/2023    ALBUMIN 1.7 (L) 12/18/2023    ALBUMIN 1.8 (L) 12/17/2023     Lab Results   Component Value Date    PREALBUMIN 10 (L) 12/15/2023    PREALBUMIN 12 (L) 12/09/2023    PREALBUMIN 19 (L) 12/01/2023       Estimated Creatinine Clearance: 141.9 mL/min (based on SCr of 0.6 mg/dL).    Accu-Checks  Recent Labs     12/16/23  3345 12/17/23  0346 12/17/23  0855 12/17/23  0856 12/17/23  1425 12/17/23  1618 12/17/23  2000 12/18/23  0025 12/18/23  0416 12/18/23  0805   POCTGLUCOSE 110 105 320* 158* 181* 192* 142* 123* 151* 144*       Current Medications and/or Treatments " Impacting Glycemic Control  Immunotherapy:    Immunosuppressants       None          Steroids:   Hormones (From admission, onward)      Start     Stop Route Frequency Ordered    12/13/23 2200  melatonin tablet 6 mg         -- Oral Nightly PRN 12/13/23 2100          Pressors:    Autonomic Drugs (From admission, onward)      Start     Stop Route Frequency Ordered    12/10/23 1045  EPINEPHrine 5 mg in dextrose 5% 250 mL infusion (premix)         -- IV Continuous 12/10/23 0943    12/09/23 2100  midodrine tablet 15 mg         -- Oral 3 times daily 12/09/23 1839          Hyperglycemia/Diabetes Medications:   Antihyperglycemics (From admission, onward)      Start     Stop Route Frequency Ordered    12/17/23 1200  insulin aspart U-100 pen 7 Units         -- SubQ 6 times per day 12/17/23 0831    12/11/23 1027  insulin aspart U-100 pen 0-10 Units         -- SubQ Every 4 hours PRN 12/11/23 0905

## 2023-12-18 NOTE — ASSESSMENT & PLAN NOTE
BG goal 140-180.      - Novolog 7 units with scheduled TF. (Hold if TF is stopped or if BG < 100 mg/dL);   - BG checks q4hr while on TF/NPO   - Novolog (aspart) insulin prn for BG excursions [facility] (150/25)   - Hypoglycemia protocol in place    ** Please notify Endocrine for any change and/or advance in diet**  ** Please call Endocrine for any BG related issues **    Discharge Planning:   TBD. Please notify endocrinology prior to discharge.

## 2023-12-18 NOTE — PROGRESS NOTES
[facility]  Adult Nutrition  Progress Note    SUMMARY       Recommendations    1. If renal formula warranted for IVÁN: Recommend Novasource renal 40 ml/hr to provide 1920 kcal, 87 g pro, 688 mL water, FWF per MD (96% EEN and 83% of EPN)- this is w/in the  NIH guidelines of cachexia.                 - consider increasing TF of Novasource Renal to goal rate of 45ml/hr to provide 2160 kcal, 98g PRO, and 774ml FF- FWF per MD (100% of EEN/EPN)- this is w/in the NIH guidelines for cachexia.     - monitor for s/s of intolerance such as residuals >500ml, n/v/d, or abdominal distension.                   - consider adding BenePRO BID if CRRT continues (PRO needs will be higher), provides 12g PRO.      - RD to consider increasing if pt tolerates.      2. If diarrhea or loose stools occur, consider adding psyllium husk BID to help bulk up stools.      3. RD to monitor wt, tolerance, skin, labs.     Goals: Meet % of EEN/EPN by RD f/u date  Nutrition Goal Status: goal met  Communication of RD Recs:  (POC)    Assessment and Plan    Endocrine  Moderate malnutrition  Malnutrition Type:  Context: acute illness or injury  Level: moderate    Related to (etiology):   Inadequate nutrition intake    Signs and Symptoms (as evidenced by):   Nutritional intake <75% x 7days, observed orbital wasting and trace edema present.     Malnutrition Characteristic Summary:  Energy Intake (Malnutrition): less than 75% for greater than 7 days  Fluid Accumulation (Malnutrition):  (Trace edema)      Interventions/Recommendations (treatment strategy):  Collaboration of nutritional care with other providers.  EN    Nutrition Diagnosis Status:   Continues       Malnutrition Assessment  Malnutrition Context: acute illness or injury  Malnutrition Level: moderate    Energy Intake (Malnutrition): less than 75% for greater than 7 days  Fluid Accumulation (Malnutrition):  (Trace edema)   Orbital Region (Subcutaneous Fat Loss): mild  depletion   Jainism Region (muscle loss): moderate depletion    Reason for Assessment    Reason For Assessment: RD follow-up  Diagnosis: cardiac disease (CHF/ s/p LVAD on 12/1)  Relevant Medical History: CAD, DMT2, CHF, PAF  Interdisciplinary Rounds: did not attend  General Information Comments: RD f/u: Pt was resting comfortably, TF were running at the ordered rate. Per pt's RN, pt has been tolerating TF well. RN stated that some minor breakdown in the sacral area, RD viewed the media, but not nutritionally sig at this time. NP reached out to RD that there was concerns for increasing nutrition d/t cachexia and skin wounds. RD to re-evaluate needs. Trace edema present. Pt is currently undergoing CRRT. Caregiver was present in the room, and RD was able to provide caregiver with VitK education. Malnutrition dx continues.  Nutrition Discharge Planning: Cardiac diet education provided 11/15- no additional questions for me at this time.    Nutrition Risk Screen    Nutrition Risk Screen: tube feeding or parenteral nutrition    Nutrition/Diet History    Spiritual, Cultural Beliefs, Bahai Practices, Values that Affect Care: no  Food Allergies: NKFA    Anthropometrics    Temp: 98 °F (36.7 °C)  Height Method: Stated  Height: 6' (182.9 cm)  Height (inches): 72 in  Weight Method: Bed Scale  Weight: 85.7 kg (189 lb)  Weight (lb): 189 lb  Ideal Body Weight (IBW), Male: 178 lb  % Ideal Body Weight, Male (lb): 96.63 %  BMI (Calculated): 25.6  BMI Grade: 25 - 29.9 - overweight    Lab/Procedures/Meds    Pertinent Labs Reviewed: reviewed  Pertinent Labs Comments: BUN: 30, cr: 2.1, GFR: 35.2, gluc: 224, Ca: 8.3, phos: 198, alb: 2.0, CRP: 196.7  Pertinent Medications Reviewed: reviewed  Pertinent Medications Comments: Amiodarone, statin, diuril, pantoprazole, abx, senna-docusate, NaCl, polyethylene glycol, coumadin, epi, heparin    Estimated/Assessed Needs    Weight Used For Calorie Calculations: 81 kg (178 lb 9.2 oz) (IBW d/t edema  present)  Energy Calorie Requirements (kcal): 2025- 2430 kcal  Energy Need Method: Kcal/kg (25-30 kcal/ kg of IBW)    Protein Requirements: 97- 122g (1.2-1.5g/kg of IBW)  Weight Used For Protein Calculations: 81 kg (178 lb 9.2 oz) (IBW d/t edema present.)    Fluid Requirements (mL): 1 ml/kcal or per MD  RDA Method (mL): 2025    Nutrition Prescription Ordered    Current Diet Order: NPO  Current Nutrition Support Formula Ordered: Novasource Renal  Current Nutrition Support Rate Ordered: 40 (ml)  Current Nutrition Support Frequency Ordered: ml/hr x 24hrs    Evaluation of Received Nutrient/Fluid Intake    Enteral Calories (kcal): 1920  Enteral Protein (gm): 87  Enteral (Free Water) Fluid (mL): 688  % Kcal Needs: 95%  % Protein Needs: 90%  I/O: +3.2L since 12/11  Energy Calories Required: meeting needs  Protein Required: meeting needs  Fluid Required:  (Per MD)  Total Fluid Intake (mL/kg): 1 ml/kcal or per MD  Comments: LBM 12/17  Tolerance: tolerating  % Intake of Estimated Energy Needs: 75 - 100 %  % Meal Intake: NPO    Nutrition Risk    Level of Risk/Frequency of Follow-up:  (RD to f/u x 1-2/week)     Monitor and Evaluation    Food and Nutrient Intake: energy intake, food and beverage intake, enteral nutrition intake, parenteral nutrition intake  Food and Nutrient Adminstration: diet order, enteral and parenteral nutrition administration  Knowledge/Beliefs/Attitudes: food and nutrition knowledge/skill, beliefs and attitudes  Physical Activity and Function: nutrition-related ADLs and IADLs, factors affecting access to physical activity  Anthropometric Measurements: height/length, weight, weight change, body mass index, growth pattern indices/percentile ranks  Biochemical Data, Medical Tests and Procedures: electrolyte and renal panel, gastrointestinal profile, glucose/endocrine profile, inflammatory profile, lipid profile  Nutrition-Focused Physical Findings: overall appearance, extremities, muscles and bones, head and  eyes, skin     Nutrition Follow-Up    RD Follow-up?: Yes

## 2023-12-18 NOTE — ASSESSMENT & PLAN NOTE
Patient is identified as having Combined Systolic and Diastolic heart failure that is Acute on chronic. CHF is currently uncontrolled due to JVD, Rales/crackles on pulmonary exam, and Pulmonary edema/pleural effusion on CXR. Latest ECHO performed and demonstrates- Results for orders placed during the hospital encounter of 11/09/23    Echo    Interpretation Summary    Left Ventricle: The left ventricle is moderately dilated. Mildly increased ventricular mass. Normal wall thickness. There is eccentric hypertrophy. Severe global hyperkinesis present. There is severely reduced systolic function with a visually estimated ejection fraction of 10 -15%. Grade III diastolic dysfunction.    Right Ventricle: Normal right ventricular cavity size. Systolic function is normal. Pacemaker lead present in the ventricle.    Left Atrium: Left atrium is severely dilated.    Mitral Valve: There is annular and bileaflet tethering. There is moderate regurgitation with a centrally directed jet.    Tricuspid Valve: There is mild regurgitation with a centrally directed jet.    Pulmonary Artery: The estimated pulmonary artery systolic pressure is 41 mmHg.    IVC/SVC: Normal venous pressure at 3 mmHg.    last BNP reviewed- and noted below   Recent Labs   Lab 12/18/23  0417   BNP 1,484*       - management per primary team  - LVAD for mechanical support

## 2023-12-18 NOTE — PROGRESS NOTES
12/18/23 0138   Treatment   Treatment Type SLED   Treatment Status Daily equipment check   Dialysis Machine Number [id number]   Dialyzer Time (hours) 9.25   BVP (Liters) 83.2 L   Solutions Labeled and Current  Yes   Access Temporary Cath;Left;IJ   Catheter Dressing Intact  Yes   Alarms Engaged Yes   CRRT Comments daily check done   $ CRRT Charges   $ CRRT Daily Assessment Complete   $ CRRT Daily Maintenance Complete     Ongoing continuous SLED. Orders and machine settings verified. Daily equipment check and maintenance done.

## 2023-12-18 NOTE — PLAN OF CARE
SICU PLAN OF CARE NOTE    Dx: LVAD (left ventricular assist device) present    Vital Signs: BP (!) 80/0 (BP Location: Left arm, Patient Position: Lying)   Pulse 101   Temp 97.5 °F (36.4 °C) (Oral)   Resp (!) 34   Ht 6' (1.829 m)   Wt 85.7 kg (189 lb)   SpO2 95%   BMI 25.63 kg/m²     SHIFT EVENTS:  Afebrile. NSR. BP WDL; midodrine 15mg given BID. 94-98% on RA. CVP 10-11. SLED paused to change patient room; both HTS and Nephro aware; dialysis stated promptly after arriving to new room. Heparin paused for multiple hours while HTS and Anesthesia placed an arterial line in brachial artery; repeat aptt to be sent at 2030.     Neuro: Follows Commands, Moves All Extremities, and Confused    Respiratory: Room Air sats 94-98%    Cardiac: NSR -- AICD ON   LVAD HM3 4900      Diet: Tube Feeds @ 40cc/hr    Gtts: Heparin     Urine Output: Anuric -- bladder scan reads <30cc    Labs/Accuchecks: q4h accuchecks with coverage; daily labs; q8h RFP.    SKIN NOTE:  Skin: Break down to backside; triad cream applied; wound care consulted. Pt turned q2h and PRN    Skin precautions maintained including:  Sacrum and heels with foam dressing in place for pressure protection. Frequent weight shift assistance provided Q2 hr to prevent further breakdown. Bed plugged in and mattress inflated; [employer]ty bed utilized. Adhesive use limited. Heels elevated off bed. Positioned off wounds. Pressure points protected and positioning supports utilized.  Skin-to-device areas padded. Skin-to-skin areas padded    POC reviewed with patient and spouse Arlyn; questions and concerns addressed. See flow sheets for full assessment details.      Problem: Impaired Wound Healing  Goal: Optimal Wound Healing  Outcome: Ongoing, Not Progressing  Intervention: Promote Wound Healing  Flowsheets (Taken 12/17/2023 1813)  Oral Nutrition Promotion: (TF @ goal) calorie-dense liquids provided  Sleep/Rest Enhancement: awakenings minimized  Activity Management:  Straight leg raise - L1  Pain Management Interventions:   breathing exercises utilized   care clustered

## 2023-12-18 NOTE — SUBJECTIVE & OBJECTIVE
Interval History:   No acute events overnight, on continuous SLED with citrate, CVP 9, BP 80/65.  Will continue SLED with -300.   Review of patient's allergies indicates:  No Known Allergies  Current Facility-Administered Medications   Medication Frequency    0.9%  NaCl infusion (CRRT USE ONLY) Continuous    0.9%  NaCl infusion Continuous    acetaminophen tablet 650 mg Q6H PRN    albuterol sulfate nebulizer solution 2.5 mg Q4H PRN    amiodarone tablet 400 mg Daily    bisacodyL suppository 10 mg Daily PRN    calcium gluconate 1 g in NS IVPB (premixed) PRN    calcium gluconate 1 g in NS IVPB (premixed) PRN    calcium gluconate 1 g in NS IVPB (premixed) PRN    calcium gluconate 30 g in dextrose 5 % (D5W) 1,000 mL infusion Continuous    dextrose 10 % infusion Continuous PRN    dextrose 10% bolus 125 mL 125 mL PRN    dextrose 10% bolus 250 mL 250 mL PRN    EPINEPHrine 5 mg in dextrose 5% 250 mL infusion (premix) Continuous    glucagon (human recombinant) injection 1 mg PRN    heparin 25,000 units in dextrose 5% 250 ml (100 units/mL) infusion MINIMAL INTENSITY nomogram - [facility] Continuous    insulin aspart U-100 pen 0-10 Units Q4H PRN    insulin aspart U-100 pen 7 Units 6 times per day    magnesium hydroxide 400 mg/5 ml suspension 2,400 mg Daily PRN    magnesium sulfate 2g in water 50mL IVPB (premix) PRN    melatonin tablet 6 mg Nightly PRN    midodrine tablet 15 mg TID    mirtazapine tablet 15 mg Nightly    pantoprazole injection 40 mg Daily    polyethylene glycol packet 17 g Daily    potassium chloride 20 mEq in 100 mL IVPB (FOR CENTRAL LINE ADMINISTRATION ONLY) PRN    potassium chloride 20 mEq in 100 mL IVPB (FOR CENTRAL LINE ADMINISTRATION ONLY) PRN    potassium chloride 40 mEq in 100 mL IVPB (FOR CENTRAL LINE ADMINISTRATION ONLY) PRN    psyllium husk (aspartame) 3.4 gram packet 1 packet Daily    QUEtiapine tablet 50 mg Q6H PRN    senna-docusate 8.6-50 mg per tablet 1 tablet Daily    sodium chloride 0.9% flush 10  mL PRN    sodium phosphate 20.01 mmol in dextrose 5 % (D5W) 250 mL IVPB PRN    sodium phosphate 30 mmol in dextrose 5 % (D5W) 250 mL IVPB PRN    sodium phosphate 39.99 mmol in dextrose 5 % (D5W) 250 mL IVPB PRN    warfarin (COUMADIN) tablet 5 mg Daily       Objective:     Vital Signs (Most Recent):  Temp: 98 °F (36.7 °C) (12/18/23 1423)  Pulse: 96 (12/18/23 1423)  Resp: (!) 24 (12/18/23 1423)  BP: (!) 80/0 (12/18/23 1423)  SpO2: 97 % (12/18/23 1423) Vital Signs (24h Range):  Temp:  [97.5 °F (36.4 °C)-98.4 °F (36.9 °C)] 98 °F (36.7 °C)  Pulse:  [] 96  Resp:  [18-35] 24  SpO2:  [91 %-97 %] 97 %  BP: (76-86)/(0) 80/0  Arterial Line BP: (69-89)/(57-83) 79/64     Weight: 85.7 kg (189 lb) (12/18/23 0500)  Body mass index is 25.63 kg/m².  Body surface area is 2.09 meters squared.    I/O last 3 completed shifts:  In: 9342.1 [I.V.:1093.7; Blood:350; NG/GT:1560; IV Piggyback:6338.4]  Out: 9949 [Other:9949]     Physical Exam  Pulmonary:      Effort: Pulmonary effort is normal.   Abdominal:      Palpations: Abdomen is soft.   Musculoskeletal:         General: No swelling.   Skin:     General: Skin is warm.   Neurological:      General: No focal deficit present.      Mental Status: He is alert.   Psychiatric:         Mood and Affect: Mood normal.          Significant Labs:  BMP:   Recent Labs   Lab 12/18/23  1407   GLU 99      K 4.2      CO2 24   BUN 5*   CREATININE 0.5   CALCIUM 7.9*   MG 1.7     CMP:   Recent Labs   Lab 12/18/23  0417 12/18/23  1407   * 99   CALCIUM 8.0* 7.9*   ALBUMIN 1.7*  1.7* 1.7*   PROT 6.0  --     136   K 4.3 4.2   CO2 22* 24    105   BUN 6* 5*   CREATININE 0.6 0.5   ALKPHOS 245*  --    ALT 32  --    AST 91*  --    BILITOT 1.3*  --         Significant Imaging:  Labs: Reviewed  X-Ray: Reviewed

## 2023-12-18 NOTE — ASSESSMENT & PLAN NOTE
The patient is a 63 year old male with complex cardiac history including heart failure with recent LVAD placement. He was intubated during this recent admission and has developed left vocal fold immobility causing dysphonia and aspiration of secretions. He is now s/p bedside injection of Restylane into left vocal fold.    Voice rest for rest of day today  Okay to continue PO trials per SLP  Follow up outpatient

## 2023-12-18 NOTE — PROGRESS NOTES
[facility]  Nephrology  Progress Note    Patient Name: Radha Abbott  MRN: [id number]  Admission Date: 11/9/2023  Hospital Length of Stay: 39 days  Attending Provider: Juventino Bermudez Jr.*   Primary Care Physician: Vasu Kong MD  Principal Problem:LVAD (left ventricular assist device) present    Subjective:     HPI: Mr. Abbott is a 63-year-old man with ischemic cardiomyopathy with most recent TTE showing EF 10 -15% and G3DD s/p [employer] ICM placement on chronic dobutamine infusion, CAD s/p x3 vessel CABG back in 2009, type 2 diabetes mellitus (most recent hemoglobin A1c 7.6%), hypertension, HLD, history of ventricular fibrillation for which he is on amiodarone and CKD IIIb (baseline creatinine ~2-2.2) who was initially admitted on 11/9 with heart failure exacerbation and hypervolemia for which he was managed with inotrope with dobutamine in addition to IV diuresis. He ultimately underwent IABP placed on 11/21 for mechanical hemodynamic support and subsequently underwent LVAD placement on 12/1. His renal function appears to be mostly stable with IVÁN and creatinine in mid 2s to 3s in addition he is still making urine with Lasix infusion however on 12/5 Nephrology consulted given concern for uremia with BUN 72.     Interval History:   No acute events overnight, on continuous SLED with citrate, CVP 9, BP 80/65.  Will continue SLED with -300.   Review of patient's allergies indicates:  No Known Allergies  Current Facility-Administered Medications   Medication Frequency    0.9%  NaCl infusion (CRRT USE ONLY) Continuous    0.9%  NaCl infusion Continuous    acetaminophen tablet 650 mg Q6H PRN    albuterol sulfate nebulizer solution 2.5 mg Q4H PRN    amiodarone tablet 400 mg Daily    bisacodyL suppository 10 mg Daily PRN    calcium gluconate 1 g in NS IVPB (premixed) PRN    calcium gluconate 1 g in NS IVPB (premixed) PRN    calcium gluconate 1 g in NS IVPB (premixed) PRN    calcium  gluconate 30 g in dextrose 5 % (D5W) 1,000 mL infusion Continuous    dextrose 10 % infusion Continuous PRN    dextrose 10% bolus 125 mL 125 mL PRN    dextrose 10% bolus 250 mL 250 mL PRN    EPINEPHrine 5 mg in dextrose 5% 250 mL infusion (premix) Continuous    glucagon (human recombinant) injection 1 mg PRN    heparin 25,000 units in dextrose 5% 250 ml (100 units/mL) infusion MINIMAL INTENSITY nomogram - [facility] Continuous    insulin aspart U-100 pen 0-10 Units Q4H PRN    insulin aspart U-100 pen 7 Units 6 times per day    magnesium hydroxide 400 mg/5 ml suspension 2,400 mg Daily PRN    magnesium sulfate 2g in water 50mL IVPB (premix) PRN    melatonin tablet 6 mg Nightly PRN    midodrine tablet 15 mg TID    mirtazapine tablet 15 mg Nightly    pantoprazole injection 40 mg Daily    polyethylene glycol packet 17 g Daily    potassium chloride 20 mEq in 100 mL IVPB (FOR CENTRAL LINE ADMINISTRATION ONLY) PRN    potassium chloride 20 mEq in 100 mL IVPB (FOR CENTRAL LINE ADMINISTRATION ONLY) PRN    potassium chloride 40 mEq in 100 mL IVPB (FOR CENTRAL LINE ADMINISTRATION ONLY) PRN    psyllium husk (aspartame) 3.4 gram packet 1 packet Daily    QUEtiapine tablet 50 mg Q6H PRN    senna-docusate 8.6-50 mg per tablet 1 tablet Daily    sodium chloride 0.9% flush 10 mL PRN    sodium phosphate 20.01 mmol in dextrose 5 % (D5W) 250 mL IVPB PRN    sodium phosphate 30 mmol in dextrose 5 % (D5W) 250 mL IVPB PRN    sodium phosphate 39.99 mmol in dextrose 5 % (D5W) 250 mL IVPB PRN    warfarin (COUMADIN) tablet 5 mg Daily       Objective:     Vital Signs (Most Recent):  Temp: 98 °F (36.7 °C) (12/18/23 1423)  Pulse: 96 (12/18/23 1423)  Resp: (!) 24 (12/18/23 1423)  BP: (!) 80/0 (12/18/23 1423)  SpO2: 97 % (12/18/23 1423) Vital Signs (24h Range):  Temp:  [97.5 °F (36.4 °C)-98.4 °F (36.9 °C)] 98 °F (36.7 °C)  Pulse:  [] 96  Resp:  [18-35] 24  SpO2:  [91 %-97 %] 97 %  BP: (76-86)/(0) 80/0  Arterial Line BP: (69-89)/(57-83) 79/64     Weight:  85.7 kg (189 lb) (12/18/23 0500)  Body mass index is 25.63 kg/m².  Body surface area is 2.09 meters squared.    I/O last 3 completed shifts:  In: 9342.1 [I.V.:1093.7; Blood:350; NG/GT:1560; IV Piggyback:6338.4]  Out: 9949 [Other:9949]     Physical Exam  Pulmonary:      Effort: Pulmonary effort is normal.   Abdominal:      Palpations: Abdomen is soft.   Musculoskeletal:         General: No swelling.   Skin:     General: Skin is warm.   Neurological:      General: No focal deficit present.      Mental Status: He is alert.   Psychiatric:         Mood and Affect: Mood normal.          Significant Labs:  BMP:   Recent Labs   Lab 12/18/23  1407   GLU 99      K 4.2      CO2 24   BUN 5*   CREATININE 0.5   CALCIUM 7.9*   MG 1.7     CMP:   Recent Labs   Lab 12/18/23  0417 12/18/23  1407   * 99   CALCIUM 8.0* 7.9*   ALBUMIN 1.7*  1.7* 1.7*   PROT 6.0  --     136   K 4.3 4.2   CO2 22* 24    105   BUN 6* 5*   CREATININE 0.6 0.5   ALKPHOS 245*  --    ALT 32  --    AST 91*  --    BILITOT 1.3*  --         Significant Imaging:  Labs: Reviewed  X-Ray: Reviewed  Assessment/Plan:     Cardiac/Vascular  Acute on chronic combined systolic and diastolic heart failure  Patient is identified as having Combined Systolic and Diastolic heart failure that is Acute on chronic. CHF is currently uncontrolled due to JVD, Rales/crackles on pulmonary exam, and Pulmonary edema/pleural effusion on CXR. Latest ECHO performed and demonstrates- Results for orders placed during the hospital encounter of 11/09/23    Echo    Interpretation Summary    Left Ventricle: The left ventricle is moderately dilated. Mildly increased ventricular mass. Normal wall thickness. There is eccentric hypertrophy. Severe global hyperkinesis present. There is severely reduced systolic function with a visually estimated ejection fraction of 10 -15%. Grade III diastolic dysfunction.    Right Ventricle: Normal right ventricular cavity size. Systolic  function is normal. Pacemaker lead present in the ventricle.    Left Atrium: Left atrium is severely dilated.    Mitral Valve: There is annular and bileaflet tethering. There is moderate regurgitation with a centrally directed jet.    Tricuspid Valve: There is mild regurgitation with a centrally directed jet.    Pulmonary Artery: The estimated pulmonary artery systolic pressure is 41 mmHg.    IVC/SVC: Normal venous pressure at 3 mmHg.    last BNP reviewed- and noted below   Recent Labs   Lab 12/18/23  0417   BNP 1,484*       - management per primary team  - LVAD for mechanical support    Renal/  Acute renal failure with acute tubular necrosis superimposed on stage 3b chronic kidney disease  - suspect acute tubular injury secondary to hypotension/cardiogenic shock likely compounded by intra-arterial contrast and anemia with urinary sediment with granular casts  - Continue SLED with citrate, -300.  - Q8H RFP, magnesium and ionized calcium per SLED protocol with citrate  - renal diet/tube feeds when not NPO, volume restriction per primary team  - strict I/O's and daily weights  - keep MAP > 65 mmHg  - renally all dose medications to eGFR  - avoid nephrotoxic agents wean feasible (i.e. NSAIDs, intra-arterial contrast, supra-therapeutic vancomycin levels, etc.)        Thank you for your consult. I will follow-up with patient. Please contact us if you have any additional questions.    Kamilla Angela MD  Nephrology  [facility]    ATTENDING PHYSICIAN ATTESTATION  I have personally verified the history and examined the patient. I thoroughly reviewed the demographic, clinical, laboratorial and imaging information available in medical records. I agree with the assessment and recommendations provided by the subspecialty resident who was under my supervision.

## 2023-12-18 NOTE — ASSESSMENT & PLAN NOTE
Lab Results   Component Value Date    CREATININE 0.6 12/18/2023     Avoid insulin stacking  Titrate insulin slowly

## 2023-12-18 NOTE — PLAN OF CARE
Recommendations     1. If renal formula warranted for IVÁN: Recommend Novasource renal 40 ml/hr to provide 1920 kcal, 87 g pro, 688 mL water, FWF per MD (96% EEN and 83% of EPN)- this is w/in the  NIH guidelines of cachexia.                 - consider increasing TF of Novasource Renal to goal rate of 45ml/hr to provide 2160 kcal, 98g PRO, and 774ml FF- FWF per MD (100% of EEN/EPN)- this is w/in the NIH guidelines for cachexia.                 - monitor for s/s of intolerance such as residuals >500ml, n/v/d, or abdominal distension.                   - consider adding BenePRO BID if CRRT continues (PRO needs will be higher), provides 12g PRO.                  - RD to consider increasing if pt tolerates.      2. If diarrhea or loose stools occur, consider adding psyllium husk BID to help bulk up stools.      3. RD to monitor wt, tolerance, skin, labs.      Goals: Meet % of EEN/EPN by RD f/u date  Nutrition Goal Status: goal met  Communication of RD Recs:  (POC)

## 2023-12-18 NOTE — PT/OT/SLP PROGRESS
Occupational Therapy   Co-Treatment    Name: Radha Abbott  MRN: [id number]  Admitting Diagnosis:  LVAD (left ventricular assist device) present  14 Days Post-Op    Recommendations:     Discharge Recommendations: High Intensity Therapy  Discharge Equipment Recommendations:   (TBD at next level of care)  Barriers to discharge:   (current level of assistance needed)    Assessment:     Radha Abbott is a 63 y.o. male with a medical diagnosis of LVAD (left ventricular assist device) present.  He presents with progress towards goals as evidenced by decreased assistance needed for STS tranfers and ability to take sidesteps. Pt continues to fatigue easily and unable to sustain unsupported static sitting for significant amount of time. Performance deficits affecting function are weakness, impaired self care skills, impaired balance, decreased safety awareness, impaired endurance, impaired functional mobility, decreased upper extremity function, decreased lower extremity function, gait instability, impaired cardiopulmonary response to activity, impaired fine motor, pain. Pt benefits from co-treatment with PT to accommodate pt's activity tolerance and progression with therapy.      Rehab Prognosis:  Good; patient would benefit from acute skilled OT services to address these deficits and reach maximum level of function.       Plan:     Patient to be seen 6 x/week to address the above listed problems via self-care/home management, therapeutic activities, neuromuscular re-education  Plan of Care Expires: 01/02/24  Plan of Care Reviewed with: patient    Subjective     Chief Complaint: weakness  Patient/Family Comments/goals: to get better and go home  Pain/Comfort:  Pain Rating 1: 0/10  Pain Rating Post-Intervention 1: 0/10    Objective:     Communicated with: RN prior to session.  Patient found supine with blood pressure cuff, pulse ox (continuous), telemetry, arterial line, LVAD, PICC line, oxygen, wound vac, NG tube, Trialysis upon  OT entry to room.    General Precautions: Standard, sternal, fall, LVAD    Orthopedic Precautions:N/A  Braces: N/A  Respiratory Status: Nasal cannula    Occupational Performance:     Bed Mobility:    Patient completed Scooting/Bridging with total assistance  Patient completed Supine to Sit with moderate assistance  Patient completed Sit to Supine with maximal assistance and 2 persons     Functional Mobility/Transfers:  Patient completed Sit <> Stand Transfer with moderate assistance and of 2 persons  with  hand-held assist x 2 trials from EOB  Functional Mobility: Max A x 2 for sidesteps x 4 along EOB    Activities of Daily Living:  Grooming: moderate assistance with UE support to wash face  Upper Body Dressing: maximal assistance    Lower Body Dressing: total assistance f      AMPAC 6 Click ADL: 10    Treatment & Education:  Pt ed on OT POC  Tx focused on upright tolerance and sitting balance needed for increased ADL (I)  Pt sate EOB x 10 min with CGA regressing to max A with fatigue and fall to L  Pt ed on ROM ex's 3x daily for increased overall strength and endurance      Patient left with bed in chair position with all lines intact, call button in reach, and RN notified    GOALS:   Multidisciplinary Problems       Occupational Therapy Goals          Problem: Occupational Therapy    Goal Priority Disciplines Outcome Interventions   Occupational Therapy Goal     OT, PT/OT Ongoing, Progressing    Description: Goals to be met by: 1/2/24     Patient will increase functional independence with ADLs by performing:    UB Dressing with SBA  LE Dressing with Supervision.  Grooming while standing at sink with Supervision.  Toileting from bedside commode with SBA for hygiene and clothing management.   Step transfer to bedside commode with SBA  [place] with VAD management during ADLs                             Time Tracking:     OT Date of Treatment: 12/18/23  OT Start Time: 0859  OT Stop Time: 0927  OT Total Time  (min): 28 min    Billable Minutes:Self Care/Home Management 8  Therapeutic Exercise 15               12/18/2023

## 2023-12-18 NOTE — PT/OT/SLP PROGRESS
Physical Therapy Treatment    Patient Name:  Radha Abbott   MRN:  [id number]  Admit Date: 2023  Admitting Diagnosis:  LVAD (left ventricular assist device) present   Length of Stay: 39 days  Recent Surgery: Procedure(s) (LRB):  CLOSURE, WOUND, STERNUM (N/A)  INSERTION, GRAFT, PERICARDIUM  DRAINAGE, PLEURAL EFFUSION 14 Days Post-Op    Recommendations:     Discharge Recommendations:  High Intensity Therapy   Discharge Equipment Recommendations: to be determined by next level of care       Plan:     During this hospitalization, patient to be seen 6 x/week to address the listed problems via therapeutic activities, gait training, therapeutic exercises, neuromuscular re-education  Plan of Care Expires:  24  Plan of Care Reviewed with: patient    Assessment:     Radha Abbott is a 63 y.o. male admitted with a medical diagnosis of LVAD (left ventricular assist device) present. Pt progressing towards goals, but not at PLOF. Pt tolerated session well withg VSS.  Pt is improving with therapy evidenced by initiating sit to stand transfer and gait. Will continue to work on sitting balance, standing tolerance, and initiating gait. Pt would benefit from continued acute PT to maximize functional mobility after surgical intervention.    Pt on CRRT with IJ access and no pressor support. No incidents occurred.           Problem List: weakness, impaired endurance, impaired self care skills, impaired functional mobility, gait instability, impaired balance, decreased upper extremity function, impaired cardiopulmonary response to activity.  Rehab Prognosis: Good     GOALS:   Multidisciplinary Problems       Physical Therapy Goals          Problem: Physical Therapy    Goal Priority Disciplines Outcome Goal Variances Interventions   Physical Therapy Goal     PT, PT/OT Ongoing, Progressing     Description: Goals to be met by: 23     Patient will increase functional independence with mobility by performin. Sit to stand  transfer with modified independence  2. Bed to chair transfer with supervision  3. Gait  x 150 feet with supervision using physician approved AD with standing erst breaks prn.   4. Lower extremity exercise program x30 reps per handout, with independence  5. Recite 3/3 sternal precautions and remain complaint with precautions throughout session with no verbal cues                     Multidisciplinary Problems (Resolved)          Problem: Physical Therapy    Goal Priority Disciplines Outcome Goal Variances Interventions   Physical Therapy Goal   (Resolved)     PT, PT/OT Met     Description: Goals to be met by: 23     Patient will increase functional independence with mobility by performin. Evaluate pt's need for physical therapy.                          Subjective   Communicated with RN prior to session.  Patient found HOB elevated upon PT entry to room, agreeable to evaluation. Radha Abbott's alone present during session.    Chief Complaint: debility   Patient/Family Comments/goals: to get better  Pain/Comfort:  Pain Rating 1: 0/10  Pain Rating Post-Intervention 1: 0/10    Objective:   Patient found with: telemetry, pulse ox (continuous), blood pressure cuff, PICC line, central line, NG tube, arterial line, LVAD, oxygen   General Precautions: Standard, Cardiac fall, LVAD, sternal   Orthopedic Precautions:N/A   Braces: N/A   Oxygen Device: Nasal Cannula  Vitals: BP (!) 80/0 (BP Location: Left arm, Patient Position: Lying)   Pulse 93   Temp 98 °F (36.7 °C) (Oral)   Resp (!) 30   Ht 6' (1.829 m)   Wt 85.7 kg (189 lb)   SpO2 97%   BMI 25.63 kg/m²     Outcome Measures:  AM-PAC 6 CLICK MOBILITY  Turning over in bed (including adjusting bedclothes, sheets and blankets)?: 2  Sitting down on and standing up from a chair with arms (e.g., wheelchair, bedside commode, etc.): 2  Moving from lying on back to sitting on the side of the bed?: 2  Moving to and from a bed to a chair (including a wheelchair)?:  2  Need to walk in hospital room?: 1  Climbing 3-5 steps with a railing?: 1  Basic Mobility Total Score: 10     Functional Mobility:  Additional staff present: OT  - due to pt requiring 2 skilled therapists to safely perform functional mobility and to accommodate pt's activity tolerance    Bed Mobility:   Supine to Sit: moderate assistance; HOB elevated  Scooting anteriorly to EOB to have both feet planted on floor: total assistance and 2 persons  Sit to Supine: maximal assistance and 2 persons; HOB flat    Sitting Balance at Edge of Bed:  Assistance Level Required: CGA progressing to max A with fatigue; pt is quick to fatigue   Time: 12 minutes  Comments:   Worked on activity tolerance sitting EOB, worked on tolerance to positional change, and worked on sitting balance   Worked on maintaining midline and correcting to midline   Worked on ADLs with OT    Transfers:   Sit <> Stand Transfer: moderate assistance and of 2 persons with no assistive device x 2 trials from raised EOB      Gait:  Patient ambulated: 4 side steps to the left    Patient required: maximal assist and of 2 persons  Patient used: no assistive device  Gait Deviation(s): unsteady gait, decreased step length, narrow base of support, flexed posture, and decreased gina  Impairments due to: impaired balance, decreased strength, decreased endurance, and impaired postural control  Comments:   Facilitation of hip and thoracic extension, B weight shifting   Blocking of B feet and B knees  Verbal cuing for advancement of B LE, upright posture, forward gaze, and deep breathing       Therapeutic Activities, Exercises, and Education:   Educated pt on PT role/POC  Pt verbalized understanding     LVAD found and remained on wall power  No alarms sounded    Patient left HOB elevated with all lines intact, call button in reach, CRRT running and RN notified ..    Time Tracking:     PT Received On: 12/18/23  PT Start Time: 0900     PT Stop Time: 0925  PT Total Time  (min): 25 min       Billable Minutes:   Gait Training 8 and Therapeutic Activity 15    Treatment Type: Treatment  PT/PTA: PT

## 2023-12-19 LAB
ALBUMIN SERPL BCP-MCNC: 1.6 G/DL (ref 3.5–5.2)
ALBUMIN SERPL BCP-MCNC: 1.7 G/DL (ref 3.5–5.2)
ALBUMIN SERPL BCP-MCNC: 1.7 G/DL (ref 3.5–5.2)
ALLENS TEST: ABNORMAL
ALP SERPL-CCNC: 219 U/L (ref 55–135)
ALT SERPL W/O P-5'-P-CCNC: 29 U/L (ref 10–44)
ANION GAP SERPL CALC-SCNC: 5 MMOL/L (ref 8–16)
ANION GAP SERPL CALC-SCNC: 7 MMOL/L (ref 8–16)
APTT PPP: 46.2 SEC (ref 21–32)
AST SERPL-CCNC: 76 U/L (ref 10–40)
BASOPHILS # BLD AUTO: 0.04 K/UL (ref 0–0.2)
BASOPHILS NFR BLD: 0.3 % (ref 0–1.9)
BILIRUB DIRECT SERPL-MCNC: 0.5 MG/DL (ref 0.1–0.3)
BILIRUB SERPL-MCNC: 1.1 MG/DL (ref 0.1–1)
BUN SERPL-MCNC: 5 MG/DL (ref 8–23)
BUN SERPL-MCNC: 5 MG/DL (ref 8–23)
CA-I BLDV-SCNC: 0.92 MMOL/L (ref 1.06–1.42)
CA-I BLDV-SCNC: 1.03 MMOL/L (ref 1.06–1.42)
CA-I BLDV-SCNC: 1.05 MMOL/L (ref 1.06–1.42)
CALCIUM SERPL-MCNC: 7.8 MG/DL (ref 8.7–10.5)
CALCIUM SERPL-MCNC: 8.2 MG/DL (ref 8.7–10.5)
CHLORIDE SERPL-SCNC: 107 MMOL/L (ref 95–110)
CHLORIDE SERPL-SCNC: 108 MMOL/L (ref 95–110)
CO2 SERPL-SCNC: 23 MMOL/L (ref 23–29)
CO2 SERPL-SCNC: 23 MMOL/L (ref 23–29)
CREAT SERPL-MCNC: 0.5 MG/DL (ref 0.5–1.4)
CREAT SERPL-MCNC: 0.6 MG/DL (ref 0.5–1.4)
DIFFERENTIAL METHOD BLD: ABNORMAL
EOSINOPHIL # BLD AUTO: 0.1 K/UL (ref 0–0.5)
EOSINOPHIL NFR BLD: 0.5 % (ref 0–8)
ERYTHROCYTE [DISTWIDTH] IN BLOOD BY AUTOMATED COUNT: 20.4 % (ref 11.5–14.5)
EST. GFR  (NO RACE VARIABLE): >60 ML/MIN/1.73 M^2
EST. GFR  (NO RACE VARIABLE): >60 ML/MIN/1.73 M^2
GLUCOSE SERPL-MCNC: 105 MG/DL (ref 70–110)
GLUCOSE SERPL-MCNC: 124 MG/DL (ref 70–110)
HCO3 UR-SCNC: 24.6 MMOL/L (ref 24–28)
HCT VFR BLD AUTO: 22 % (ref 40–54)
HGB BLD-MCNC: 7.2 G/DL (ref 14–18)
IMM GRANULOCYTES # BLD AUTO: 0.19 K/UL (ref 0–0.04)
IMM GRANULOCYTES NFR BLD AUTO: 1.2 % (ref 0–0.5)
INR PPP: 1.8 (ref 0.8–1.2)
LDH SERPL L TO P-CCNC: 585 U/L (ref 110–260)
LYMPHOCYTES # BLD AUTO: 1 K/UL (ref 1–4.8)
LYMPHOCYTES NFR BLD: 6.2 % (ref 18–48)
MAGNESIUM SERPL-MCNC: 1.9 MG/DL (ref 1.6–2.6)
MAGNESIUM SERPL-MCNC: 2.1 MG/DL (ref 1.6–2.6)
MCH RBC QN AUTO: 30.4 PG (ref 27–31)
MCHC RBC AUTO-ENTMCNC: 32.7 G/DL (ref 32–36)
MCV RBC AUTO: 93 FL (ref 82–98)
MONOCYTES # BLD AUTO: 0.9 K/UL (ref 0.3–1)
MONOCYTES NFR BLD: 6 % (ref 4–15)
NEUTROPHILS # BLD AUTO: 13.5 K/UL (ref 1.8–7.7)
NEUTROPHILS NFR BLD: 85.8 % (ref 38–73)
NRBC BLD-RTO: 1 /100 WBC
PCO2 BLDA: 35.9 MMHG (ref 35–45)
PH SMN: 7.45 [PH] (ref 7.35–7.45)
PHOSPHATE SERPL-MCNC: 2.3 MG/DL (ref 2.7–4.5)
PHOSPHATE SERPL-MCNC: 2.4 MG/DL (ref 2.7–4.5)
PLATELET # BLD AUTO: 423 K/UL (ref 150–450)
PMV BLD AUTO: 11.5 FL (ref 9.2–12.9)
PO2 BLDA: 25 MMHG (ref 40–60)
PO2 BLDA: 25 MMHG (ref 40–60)
PO2 BLDA: 26 MMHG (ref 40–60)
POC BE: 1 MMOL/L
POC SATURATED O2: 46 % (ref 95–100)
POC SATURATED O2: 46 % (ref 95–100)
POC SATURATED O2: 52 % (ref 95–100)
POC TCO2: 26 MMOL/L (ref 24–29)
POCT GLUCOSE: 111 MG/DL (ref 70–110)
POCT GLUCOSE: 146 MG/DL (ref 70–110)
POCT GLUCOSE: 151 MG/DL (ref 70–110)
POCT GLUCOSE: 165 MG/DL (ref 70–110)
POCT GLUCOSE: 182 MG/DL (ref 70–110)
POCT GLUCOSE: 219 MG/DL (ref 70–110)
POCT GLUCOSE: 97 MG/DL (ref 70–110)
POTASSIUM SERPL-SCNC: 4.3 MMOL/L (ref 3.5–5.1)
POTASSIUM SERPL-SCNC: 4.3 MMOL/L (ref 3.5–5.1)
PROT SERPL-MCNC: 5.9 G/DL (ref 6–8.4)
PROTHROMBIN TIME: 18.8 SEC (ref 9–12.5)
RBC # BLD AUTO: 2.37 M/UL (ref 4.6–6.2)
SAMPLE: ABNORMAL
SITE: ABNORMAL
SODIUM SERPL-SCNC: 136 MMOL/L (ref 136–145)
SODIUM SERPL-SCNC: 137 MMOL/L (ref 136–145)
WBC # BLD AUTO: 15.74 K/UL (ref 3.9–12.7)

## 2023-12-19 PROCEDURE — 25000003 PHARM REV CODE 250

## 2023-12-19 PROCEDURE — 27000221 HC OXYGEN, UP TO 24 HOURS

## 2023-12-19 PROCEDURE — 97535 SELF CARE MNGMENT TRAINING: CPT

## 2023-12-19 PROCEDURE — 63600175 PHARM REV CODE 636 W HCPCS

## 2023-12-19 PROCEDURE — 27000646 HC AEROBIKA DEVICE

## 2023-12-19 PROCEDURE — 25000003 PHARM REV CODE 250: Performed by: STUDENT IN AN ORGANIZED HEALTH CARE EDUCATION/TRAINING PROGRAM

## 2023-12-19 PROCEDURE — 80076 HEPATIC FUNCTION PANEL: CPT | Performed by: INTERNAL MEDICINE

## 2023-12-19 PROCEDURE — 90945 DIALYSIS ONE EVALUATION: CPT

## 2023-12-19 PROCEDURE — 20000000 HC ICU ROOM

## 2023-12-19 PROCEDURE — 97110 THERAPEUTIC EXERCISES: CPT

## 2023-12-19 PROCEDURE — C9113 INJ PANTOPRAZOLE SODIUM, VIA: HCPCS | Performed by: PHYSICIAN ASSISTANT

## 2023-12-19 PROCEDURE — 92526 ORAL FUNCTION THERAPY: CPT

## 2023-12-19 PROCEDURE — 83735 ASSAY OF MAGNESIUM: CPT

## 2023-12-19 PROCEDURE — 25000003 PHARM REV CODE 250: Performed by: INTERNAL MEDICINE

## 2023-12-19 PROCEDURE — 25000003 PHARM REV CODE 250: Performed by: NURSE PRACTITIONER

## 2023-12-19 PROCEDURE — 80100008 HC CRRT DAILY MAINTENANCE

## 2023-12-19 PROCEDURE — 99900035 HC TECH TIME PER 15 MIN (STAT)

## 2023-12-19 PROCEDURE — 99292 CRITICAL CARE ADDL 30 MIN: CPT | Mod: FS,,, | Performed by: NURSE PRACTITIONER

## 2023-12-19 PROCEDURE — 97116 GAIT TRAINING THERAPY: CPT

## 2023-12-19 PROCEDURE — 99232 SBSQ HOSP IP/OBS MODERATE 35: CPT | Mod: ,,, | Performed by: NURSE PRACTITIONER

## 2023-12-19 PROCEDURE — 94761 N-INVAS EAR/PLS OXIMETRY MLT: CPT

## 2023-12-19 PROCEDURE — 82803 BLOOD GASES ANY COMBINATION: CPT

## 2023-12-19 PROCEDURE — 85730 THROMBOPLASTIN TIME PARTIAL: CPT | Performed by: INTERNAL MEDICINE

## 2023-12-19 PROCEDURE — 97530 THERAPEUTIC ACTIVITIES: CPT

## 2023-12-19 PROCEDURE — 99291 CRITICAL CARE FIRST HOUR: CPT | Mod: FS,,, | Performed by: NURSE PRACTITIONER

## 2023-12-19 PROCEDURE — 27000248 HC VAD-ADDITIONAL DAY

## 2023-12-19 PROCEDURE — 94664 DEMO&/EVAL PT USE INHALER: CPT

## 2023-12-19 PROCEDURE — 93750 INTERROGATION VAD IN PERSON: CPT | Mod: ,,, | Performed by: INTERNAL MEDICINE

## 2023-12-19 PROCEDURE — 85025 COMPLETE CBC W/AUTO DIFF WBC: CPT | Performed by: INTERNAL MEDICINE

## 2023-12-19 PROCEDURE — 82330 ASSAY OF CALCIUM: CPT | Mod: 91 | Performed by: STUDENT IN AN ORGANIZED HEALTH CARE EDUCATION/TRAINING PROGRAM

## 2023-12-19 PROCEDURE — 85610 PROTHROMBIN TIME: CPT | Performed by: INTERNAL MEDICINE

## 2023-12-19 PROCEDURE — 25000003 PHARM REV CODE 250: Performed by: THORACIC SURGERY (CARDIOTHORACIC VASCULAR SURGERY)

## 2023-12-19 PROCEDURE — 63600175 PHARM REV CODE 636 W HCPCS: Performed by: PHYSICIAN ASSISTANT

## 2023-12-19 PROCEDURE — 83615 LACTATE (LD) (LDH) ENZYME: CPT | Performed by: STUDENT IN AN ORGANIZED HEALTH CARE EDUCATION/TRAINING PROGRAM

## 2023-12-19 PROCEDURE — 25000242 PHARM REV CODE 250 ALT 637 W/ HCPCS: Performed by: INTERNAL MEDICINE

## 2023-12-19 PROCEDURE — 94799 UNLISTED PULMONARY SVC/PX: CPT

## 2023-12-19 PROCEDURE — 80069 RENAL FUNCTION PANEL: CPT

## 2023-12-19 RX ORDER — MIDODRINE HYDROCHLORIDE 5 MG/1
15 TABLET ORAL 3 TIMES DAILY
Status: DISCONTINUED | OUTPATIENT
Start: 2023-12-19 | End: 2023-12-19

## 2023-12-19 RX ORDER — INSULIN ASPART 100 [IU]/ML
5 INJECTION, SOLUTION INTRAVENOUS; SUBCUTANEOUS
Status: DISCONTINUED | OUTPATIENT
Start: 2023-12-19 | End: 2023-12-25

## 2023-12-19 RX ORDER — OXYCODONE HYDROCHLORIDE 5 MG/1
5 TABLET ORAL EVERY 6 HOURS PRN
Status: DISCONTINUED | OUTPATIENT
Start: 2023-12-19 | End: 2023-12-24

## 2023-12-19 RX ORDER — AMOXICILLIN 250 MG
2 CAPSULE ORAL DAILY
Status: DISCONTINUED | OUTPATIENT
Start: 2023-12-20 | End: 2024-01-13

## 2023-12-19 RX ORDER — GABAPENTIN 100 MG/1
100 CAPSULE ORAL 2 TIMES DAILY
Status: DISCONTINUED | OUTPATIENT
Start: 2023-12-19 | End: 2023-12-19

## 2023-12-19 RX ORDER — WARFARIN SODIUM 5 MG/1
5 TABLET ORAL DAILY
Status: DISCONTINUED | OUTPATIENT
Start: 2023-12-20 | End: 2023-12-21

## 2023-12-19 RX ORDER — GABAPENTIN 250 MG/5ML
125 SOLUTION ORAL EVERY 12 HOURS
Status: DISCONTINUED | OUTPATIENT
Start: 2023-12-19 | End: 2024-01-12

## 2023-12-19 RX ORDER — MIDODRINE HYDROCHLORIDE 5 MG/1
10 TABLET ORAL 3 TIMES DAILY
Status: DISCONTINUED | OUTPATIENT
Start: 2023-12-19 | End: 2023-12-20

## 2023-12-19 RX ADMIN — WARFARIN SODIUM 6 MG: 4 TABLET ORAL at 04:12

## 2023-12-19 RX ADMIN — CALCIUM GLUCONATE: 98 INJECTION, SOLUTION INTRAVENOUS at 03:12

## 2023-12-19 RX ADMIN — CALCIUM GLUCONATE 3 G: 20 INJECTION, SOLUTION INTRAVENOUS at 11:12

## 2023-12-19 RX ADMIN — MIDODRINE HYDROCHLORIDE 15 MG: 5 TABLET ORAL at 09:12

## 2023-12-19 RX ADMIN — INSULIN ASPART 5 UNITS: 100 INJECTION, SOLUTION INTRAVENOUS; SUBCUTANEOUS at 03:12

## 2023-12-19 RX ADMIN — INSULIN ASPART 5 UNITS: 100 INJECTION, SOLUTION INTRAVENOUS; SUBCUTANEOUS at 07:12

## 2023-12-19 RX ADMIN — MIDODRINE HYDROCHLORIDE 10 MG: 5 TABLET ORAL at 03:12

## 2023-12-19 RX ADMIN — CALCIUM GLUCONATE: 98 INJECTION, SOLUTION INTRAVENOUS at 11:12

## 2023-12-19 RX ADMIN — INSULIN ASPART 2 UNITS: 100 INJECTION, SOLUTION INTRAVENOUS; SUBCUTANEOUS at 03:12

## 2023-12-19 RX ADMIN — MIRTAZAPINE 15 MG: 15 TABLET, FILM COATED ORAL at 08:12

## 2023-12-19 RX ADMIN — MAGNESIUM SULFATE HEPTAHYDRATE 2 G: 40 INJECTION, SOLUTION INTRAVENOUS at 12:12

## 2023-12-19 RX ADMIN — PSYLLIUM HUSK 1 PACKET: 3.4 POWDER ORAL at 09:12

## 2023-12-19 RX ADMIN — SODIUM PHOSPHATE, MONOBASIC, MONOHYDRATE AND SODIUM PHOSPHATE, DIBASIC, ANHYDROUS 30 MMOL: 142; 276 INJECTION, SOLUTION INTRAVENOUS at 12:12

## 2023-12-19 RX ADMIN — CALCIUM GLUCONATE 1 G: 20 INJECTION, SOLUTION INTRAVENOUS at 05:12

## 2023-12-19 RX ADMIN — INSULIN ASPART 7 UNITS: 100 INJECTION, SOLUTION INTRAVENOUS; SUBCUTANEOUS at 04:12

## 2023-12-19 RX ADMIN — QUETIAPINE FUMARATE 50 MG: 25 TABLET ORAL at 11:12

## 2023-12-19 RX ADMIN — POLYETHYLENE GLYCOL 3350 17 G: 17 POWDER, FOR SOLUTION ORAL at 09:12

## 2023-12-19 RX ADMIN — GABAPENTIN 125 MG: 250 SOLUTION ORAL at 08:12

## 2023-12-19 RX ADMIN — INSULIN ASPART 7 UNITS: 100 INJECTION, SOLUTION INTRAVENOUS; SUBCUTANEOUS at 12:12

## 2023-12-19 RX ADMIN — PANTOPRAZOLE SODIUM 40 MG: 40 INJECTION, POWDER, FOR SOLUTION INTRAVENOUS at 09:12

## 2023-12-19 RX ADMIN — MIDODRINE HYDROCHLORIDE 10 MG: 5 TABLET ORAL at 08:12

## 2023-12-19 RX ADMIN — SENNOSIDES AND DOCUSATE SODIUM 1 TABLET: 8.6; 5 TABLET ORAL at 09:12

## 2023-12-19 RX ADMIN — Medication: at 08:12

## 2023-12-19 RX ADMIN — AMIODARONE HYDROCHLORIDE 400 MG: 200 TABLET ORAL at 09:12

## 2023-12-19 RX ADMIN — BISACODYL 10 MG: 10 SUPPOSITORY RECTAL at 03:12

## 2023-12-19 RX ADMIN — INSULIN ASPART 2 UNITS: 100 INJECTION, SOLUTION INTRAVENOUS; SUBCUTANEOUS at 11:12

## 2023-12-19 RX ADMIN — INSULIN ASPART 5 UNITS: 100 INJECTION, SOLUTION INTRAVENOUS; SUBCUTANEOUS at 11:12

## 2023-12-19 RX ADMIN — GABAPENTIN 125 MG: 250 SOLUTION ORAL at 12:12

## 2023-12-19 RX ADMIN — INSULIN ASPART 7 UNITS: 100 INJECTION, SOLUTION INTRAVENOUS; SUBCUTANEOUS at 08:12

## 2023-12-19 RX ADMIN — Medication: at 09:12

## 2023-12-19 NOTE — CARE UPDATE
-Glucose Goal 140-180    -A1C:   Hemoglobin A1C   Date Value Ref Range Status   10/12/2023 7.6 (H) 4.0 - 5.6 % Final     Comment:     ADA Screening Guidelines:  5.7-6.4%  Consistent with prediabetes  >or=6.5%  Consistent with diabetes    High levels of fetal hemoglobin interfere with the HbA1C  assay. Heterozygous hemoglobin variants (HbS, HgC, etc)do  not significantly interfere with this assay.   However, presence of multiple variants may affect accuracy.           -HOME REGIMEN: metformin     -GLUCOSE TREND FOR THE PAST 24HRS:   Recent Labs   Lab 12/18/23 2026 12/19/23  0040 12/19/23  0418 12/19/23  0834 12/19/23  1230 12/19/23  1548   POCTGLUCOSE 131* 111* 146* 151* 97 182*         -NO HYPOGYCEMIAS NOTED     - Diet  Diet NPO Except for: Medication (per NG tube)    -Steroids - none     -Tube Feeds - TF @ 45ml/hr         Plan:   - Novolog 5 units q 4 hr to cover TF- hold if TF held or BG < 100  - POCT Glucose every 4 hours  - Hypoglycemia protocol in place      ** Please notify Endocrine for any change and/or advance in diet**  ** Please call Endocrine for any BG related issues **     Discharge Planning:   TBD. Please notify endocrinology prior to discharge.

## 2023-12-19 NOTE — PROGRESS NOTES
12/19/23 0230   Treatment   Treatment Type SLED   Treatment Status Daily equipment check   Dialysis Machine Number [id number]   Dialyzer Time (hours) 34.12   BVP (Liters) 300.6 L   Solutions Labeled and Current  Yes   Access Temporary Cath;Left;IJ   Catheter Dressing Intact  Yes   Alarms Engaged Yes   CRRT Comments Daily check done.

## 2023-12-19 NOTE — PROGRESS NOTES
12/19/2023  Monet Aguiar    Current provider:  Juventino Bermudez Jr.*    Device interrogation:      12/19/2023    11:15 AM 12/19/2023    10:00 AM 12/19/2023     9:00 AM 12/19/2023     8:00 AM 12/19/2023     7:00 AM 12/19/2023     6:00 AM 12/19/2023     5:00 AM   TXP LVAD INTERROGATIONS   Type  HeartMate3 HeartMate3 HeartMate3 HeartMate3     Flow 4.1 4 4.1 4.2 4.2 4.1 4.1   Speed 4900 4900 4900 5000 4900 4900 4900   PI 4.2 4.2 4.1 3.3 4 4 3.5   Power ([place]) 3.3 3.3 3.3 3.3 3.2 3.2 3.3   LSL 4500 4500 4500 4500 4500 4500 4500   Pulsatility Intermittent pulse Intermittent pulse Intermittent pulse Intermittent pulse Intermittent pulse Intermittent pulse Intermittent pulse          Rounded on [facility] to ensure all mechanical assist device settings (IABP or VAD) were appropriate and all parameters were within limits.  I was able to ensure all back up equipment was present, the staff had no issues, and the Perfusion Department daily rounding was complete.      For implantable VADs: Interrogation of Ventricular assist device was performed with analysis of device parameters and review of device function. I have personally reviewed the interrogation findings and agree with findings as stated.     In emergency, the nursing units have been notified to contact the perfusion department either by:  Calling [phone] from 630am to 4pm Mon thru Fri, utilizing the On-Call Finder functionality of Epic and searching for Perfusion, or by contacting the hospital  from 4pm to 630am and on weekends and asking to speak with the perfusionist on call.    1:55 PM

## 2023-12-19 NOTE — SUBJECTIVE & OBJECTIVE
Interval History: Seen on SLED this am. CVP 9. Net +243. Oxygenating well on RA. Off pressors.     Review of patient's allergies indicates:  No Known Allergies  Current Facility-Administered Medications   Medication Frequency    0.9%  NaCl infusion Continuous    0.9%  NaCl infusion Continuous    acetaminophen tablet 650 mg Q6H PRN    albuterol sulfate nebulizer solution 2.5 mg Q4H PRN    amiodarone tablet 400 mg Daily    balsam peru-castor oiL Oint BID    bisacodyL suppository 10 mg Daily PRN    calcium gluconate 1 g in NS IVPB (premixed) PRN    calcium gluconate 1 g in NS IVPB (premixed) PRN    calcium gluconate 1 g in NS IVPB (premixed) PRN    dextrose 10 % infusion Continuous PRN    dextrose 10% bolus 125 mL 125 mL PRN    dextrose 10% bolus 250 mL 250 mL PRN    EPINEPHrine 5 mg in dextrose 5% 250 mL infusion (premix) Continuous    gabapentin 250 mg/5 mL (5 mL) solution 125 mg Q12H    glucagon (human recombinant) injection 1 mg PRN    heparin 25,000 units in dextrose 5% 250 ml (100 units/mL) infusion MINIMAL INTENSITY nomogram - [facility] Continuous    insulin aspart U-100 pen 0-10 Units Q4H PRN    insulin aspart U-100 pen 7 Units 6 times per day    magnesium hydroxide 400 mg/5 ml suspension 2,400 mg Daily PRN    melatonin tablet 6 mg Nightly PRN    midodrine tablet 10 mg TID    mirtazapine tablet 15 mg Nightly    oxyCODONE immediate release tablet 5 mg Q6H PRN    pantoprazole injection 40 mg Daily    polyethylene glycol packet 17 g Daily    potassium chloride 20 mEq in 100 mL IVPB (FOR CENTRAL LINE ADMINISTRATION ONLY) PRN    potassium chloride 20 mEq in 100 mL IVPB (FOR CENTRAL LINE ADMINISTRATION ONLY) PRN    potassium chloride 40 mEq in 100 mL IVPB (FOR CENTRAL LINE ADMINISTRATION ONLY) PRN    psyllium husk (aspartame) 3.4 gram packet 1 packet Daily    QUEtiapine tablet 50 mg Q6H PRN    senna-docusate 8.6-50 mg per tablet 1 tablet Daily    sodium chloride 0.9% flush 10 mL PRN    [START ON 12/20/2023] warfarin  (COUMADIN) tablet 5 mg Daily    warfarin tablet 6 mg Once       Objective:     Vital Signs (Most Recent):  Temp: 97.5 °F (36.4 °C) (12/19/23 0300)  Pulse: 100 (12/19/23 1200)  Resp: (!) 28 (12/19/23 1200)  BP: (!) 82/0 (12/19/23 0800)  SpO2: (!) 90 % (12/19/23 0826) Vital Signs (24h Range):  Temp:  [97.5 °F (36.4 °C)-98 °F (36.7 °C)] 97.5 °F (36.4 °C)  Pulse:  [] 100  Resp:  [21-35] 28  SpO2:  [83 %-97 %] 90 %  BP: (80-84)/(0) 82/0  Arterial Line BP: (70-98)/(55-98) 76/61     Weight: 82.1 kg (181 lb) (12/19/23 0500)  Body mass index is 24.55 kg/m².  Body surface area is 2.04 meters squared.    I/O last 3 completed shifts:  In: 51442.4 [I.V.:1267; NG/GT:1755; IV Piggyback:7434.4]  Out: 9839 [Other:9839]     Physical Exam  Vitals and nursing note reviewed.   Eyes:      Conjunctiva/sclera: Conjunctivae normal.   Cardiovascular:      Comments: LVAD   Pulmonary:      Effort: Pulmonary effort is normal.      Comments: RA  Abdominal:      Palpations: Abdomen is soft.   Musculoskeletal:         General: No swelling.      Right lower leg: No edema.      Left lower leg: No edema.   Skin:     General: Skin is warm.   Neurological:      Mental Status: He is alert and oriented to person, place, and time.   Psychiatric:         Mood and Affect: Mood normal.          Significant Labs:  CBC:   Recent Labs   Lab 12/19/23 0419   WBC 15.74*   RBC 2.37*   HGB 7.2*   HCT 22.0*      MCV 93   MCH 30.4   MCHC 32.7     CMP:   Recent Labs   Lab 12/19/23 0419   *   CALCIUM 7.8*   ALBUMIN 1.7*  1.6*   PROT 5.9*      K 4.3   CO2 23      BUN 5*   CREATININE 0.5   ALKPHOS 219*   ALT 29   AST 76*   BILITOT 1.1*     All labs within the past 24 hours have been reviewed.

## 2023-12-19 NOTE — ASSESSMENT & PLAN NOTE
- suspect acute tubular injury secondary to hypotension/cardiogenic shock likely compounded by intra-arterial contrast and anemia with urinary sediment with granular casts  - Plan to hold RRT today   - renal diet/tube feeds when not NPO, volume restriction per primary team  - strict I/O's and daily weights  - renally all dose medications to eGFR  - avoid nephrotoxic agents wean feasible (i.e. NSAIDs, intra-arterial contrast, supra-therapeutic vancomycin levels, etc.)  - Will reeval RRT needs daily

## 2023-12-19 NOTE — PT/OT/SLP PROGRESS
Physical Therapy Treatment    Patient Name:  Radha Abbott   MRN:  [id number]  Admit Date: 11/9/2023  Admitting Diagnosis:  LVAD (left ventricular assist device) present   Length of Stay: 40 days  Recent Surgery: Procedure(s) (LRB):  CLOSURE, WOUND, STERNUM (N/A)  INSERTION, GRAFT, PERICARDIUM  DRAINAGE, PLEURAL EFFUSION 15 Days Post-Op    Recommendations:     Discharge Recommendations:  High Intensity Therapy   Discharge Equipment Recommendations: to be determined by next level of care       Plan:     During this hospitalization, patient to be seen 6 x/week to address the listed problems via gait training, therapeutic activities, therapeutic exercises, neuromuscular re-education  Plan of Care Expires:  01/04/24  Plan of Care Reviewed with: patient    Assessment:     Radha Abbott is a 63 y.o. male admitted with a medical diagnosis of LVAD (left ventricular assist device) present. Pt progressing towards goals, but not at PLOF. Pt tolerated session well with VSS. Pt remains on CRRT with IJ access with no pressor support. No incidents occurred. Pt is improving with therapy evidenced by improved sitting balance, improved quality of sit to stand transfer, and increased gait distance. Pt continues to c/o R plantar foot. RN notified. Pt would benefit from continued acute PT to maximize functional mobility after surgical intervention. Patient presents with good participation and motivation to return to prior level of function with High Intensity Therapy.  The Patient demonstrates appropriate endurance and strength to participate in up to 3 hours or 15hrs of combined therapy post acute.        Problem List: weakness, impaired endurance, impaired functional mobility, gait instability, impaired balance, decreased upper extremity function, pain, impaired cardiopulmonary response to activity.  Rehab Prognosis: Good     GOALS:   Multidisciplinary Problems       Physical Therapy Goals          Problem: Physical Therapy    Goal Priority  Disciplines Outcome Goal Variances Interventions   Physical Therapy Goal     PT, PT/OT Ongoing, Progressing     Description: Goals to be met by: 23     Patient will increase functional independence with mobility by performin. Sit to stand transfer with modified independence  2. Bed to chair transfer with supervision  3. Gait  x 150 feet with supervision using physician approved AD with standing erst breaks prn.   4. Lower extremity exercise program x30 reps per handout, with independence  5. Recite 3/3 sternal precautions and remain complaint with precautions throughout session with no verbal cues                     Multidisciplinary Problems (Resolved)          Problem: Physical Therapy    Goal Priority Disciplines Outcome Goal Variances Interventions   Physical Therapy Goal   (Resolved)     PT, PT/OT Met     Description: Goals to be met by: 23     Patient will increase functional independence with mobility by performin. Evaluate pt's need for physical therapy.                          Subjective   Communicated with RN prior to session.  Patient found HOB elevated upon PT entry to room, agreeable to evaluation. Radha Abbott's alone during session.    Chief Complaint: foot pain  Patient/Family Comments/goals: to get better  Pain/Comfort:  Pain Rating 1: 910  Location 1:  (R foot)  Pain Addressed 1: Reposition, Distraction  Pain Rating Post-Intervention 1: 910    Objective:   Patient found with: telemetry, pulse ox (continuous), blood pressure cuff, PICC line, central line, NG tube, arterial line, LVAD, oxygen   General Precautions: Standard, Cardiac fall, LVAD, sternal   Orthopedic Precautions:N/A   Braces: N/A   Oxygen Device: Nasal Cannula  Vitals: BP (!) 88/0 (BP Location: Left arm, Patient Position: Lying)   Pulse 101   Temp 97.5 °F (36.4 °C) (Oral)   Resp (!) 25   Ht 6' (1.829 m)   Wt 82.1 kg (181 lb)   SpO2 (!) 94%   BMI 24.55 kg/m²     Outcome Measures:  AM-PAC 6 CLICK  MOBILITY  Turning over in bed (including adjusting bedclothes, sheets and blankets)?: 2  Sitting down on and standing up from a chair with arms (e.g., wheelchair, bedside commode, etc.): 2  Moving from lying on back to sitting on the side of the bed?: 2  Moving to and from a bed to a chair (including a wheelchair)?: 2  Need to walk in hospital room?: 2  Climbing 3-5 steps with a railing?: 1  Basic Mobility Total Score: 11       Functional Mobility:  Additional staff present: OT - due to pt requiring 2 skilled therapists to safely perform functional mobility and to accommodate pt's activity tolerance    Bed Mobility:   Supine to Sit: moderate assistance; HOB elevated  Scooting anteriorly to EOB to have both feet planted on floor: total assistance  Sit to Supine: maximal assistance and 2 persons; HOB flat    Sitting Balance at Edge of Bed:  Assistance Level Required: Stand-by Assistance  Time: 10 minutes  Comments:   Worked on activity tolerance sitting EOB, worked on tolerance to positional change, and worked on sitting balance   Pt performed ADLs with OT    Transfers:   Sit <> Stand Transfer: moderate assistance and of 2 persons with no assistive device from raised EOB x 2 trials   Facilitation of hip and thoracic extension   Facilitation of weight bearing through R LE (pain)      Gait:  Patient ambulated: 6 side steps to the left    Patient required: maximal assist and of 2 persons  Patient used: no assistive device  Gait Deviation(s): unsteady gait, decreased step length, narrow base of support, flexed posture, and decreased gina  Impairments due to: impaired balance, decreased strength, decreased endurance, pain, and impaired postural control  Comments:   Facilitation of hip and thoracic extension   Facilitation of B weight shifting  Verbal cuing for advancement of B LE   Pt reported pain on plantar surface of R foot with weight bearing       Therapeutic Activities, Exercises, and Education:   Educated pt on  PT role/POC  Educated pt on importance of OOB activity and daily ambulation   Educated pt on sternal precautions  Pt verbalized understanding     LVAD found and remained on wall power.  No alarms sounded       Patient left HOB elevated with all lines intact, call button in reach, and RN notified     Time Tracking:     PT Received On: 12/19/23  PT Start Time: 0905     PT Stop Time: 0929  PT Total Time (min): 24 min       Billable Minutes:   Gait Training 8 and Therapeutic Exercise 15    Treatment Type: Treatment  PT/PTA: PT

## 2023-12-19 NOTE — PROGRESS NOTES
[facility]  Heart Transplant  Progress Note    Patient Name: Radha Abbott  MRN: [id number]  Admission Date: 11/9/2023  Hospital Length of Stay: 40 days  Attending Physician: Juventino Bermudez Jr.*  Primary Care Provider: Vasu Kong MD  Principal Problem:LVAD (left ventricular assist device) present    Subjective:   Interval History: Left vocal cord augmented by ENT yesterday and he is able to talk. Plan MBSS tomorrow. Remains anuric, Was on SLED until ~ 1 PM. CVP 7. Sent secure chat to Nephrology to discuss when HD trial would be appropriate. Needs trialysis cath change - also asking Nephrology if we should have a tunneled cath placed at this point. No significant hypotension - decreased Midodrine to 10 mg tid.  Last BM was last Saturday. Increased Senna doc to 2 tabs bid and will give suppository.    Continuous Infusions:   sodium chloride 0.9% Stopped (12/17/23 2300)    sodium chloride 0.9%      dextrose 10 % in water (D10W)      EPINEPHrine Stopped (12/13/23 2344)    heparin (porcine) in D5W 7 Units/kg/hr (12/19/23 1100)     Scheduled Meds:   amiodarone  400 mg Per NG tube Daily    balsam peru-castor oiL   Topical (Top) BID    gabapentin  125 mg Per NG tube Q12H    insulin aspart U-100  5 Units Subcutaneous 6 times per day    midodrine  10 mg Per NG tube TID    mirtazapine  15 mg Per NG tube Nightly    pantoprazole  40 mg Intravenous Daily    polyethylene glycol  17 g Per NG tube Daily    psyllium husk (aspartame)  1 packet Per NG tube Daily    senna-docusate 8.6-50 mg  1 tablet Per NG tube Daily    [START ON 12/20/2023] warfarin  5 mg Per NG tube Daily    warfarin  6 mg Per NG tube Once     PRN Meds:acetaminophen, albuterol sulfate, bisacodyL, calcium gluconate IVPB, calcium gluconate IVPB, calcium gluconate IVPB, dextrose 10 % in water (D10W), dextrose 10%, dextrose 10%, glucagon (human recombinant), insulin aspart U-100, magnesium hydroxide 400 mg/5 ml, melatonin, oxyCODONE,  potassium chloride, potassium chloride, potassium chloride in water **AND** [DISCONTINUED] potassium chloride in water **AND** [DISCONTINUED] potassium chloride in water, QUEtiapine, Flushing PICC/Midline Protocol **AND** [DISCONTINUED] sodium chloride 0.9% **AND** sodium chloride 0.9%    Review of patient's allergies indicates:  No Known Allergies  Objective:     Vital Signs (Most Recent):  Temp: 97.5 °F (36.4 °C) (12/19/23 0300)  Pulse: 100 (12/19/23 1200)  Resp: (!) 28 (12/19/23 1200)  BP: (!) 82/0 (12/19/23 0800)  SpO2: (!) 90 % (12/19/23 0826) Vital Signs (24h Range):  Temp:  [97.5 °F (36.4 °C)-98 °F (36.7 °C)] 97.5 °F (36.4 °C)  Pulse:  [] 100  Resp:  [21-35] 28  SpO2:  [83 %-97 %] 90 %  BP: (80-84)/(0) 82/0  Arterial Line BP: (70-98)/(55-98) 76/61     Patient Vitals for the past 72 hrs (Last 3 readings):   Weight   12/19/23 0500 82.1 kg (181 lb)   12/18/23 0500 85.7 kg (189 lb)   12/17/23 0500 85.7 kg (189 lb)     Body mass index is 24.55 kg/m².      Intake/Output Summary (Last 24 hours) at 12/19/2023 1348  Last data filed at 12/19/2023 1307  Gross per 24 hour   Intake 5706.64 ml   Output 6289 ml   Net -582.36 ml       Hemodynamic Parameters:       Telemetry: ST       Physical Exam  Constitutional:       Comments: Cachectic, temporal wasting   HENT:      Head: Normocephalic and atraumatic.   Eyes:      Conjunctiva/sclera: Conjunctivae normal.      Pupils: Pupils are equal, round, and reactive to light.   Neck:      Comments: RIJ trialysis line. Do not appreciate elevated JVP  Cardiovascular:      Rate and Rhythm: Normal rate and regular rhythm.      Comments: Smooth VAD hum  Pulmonary:      Effort: Pulmonary effort is normal.      Breath sounds: Normal breath sounds.   Abdominal:      General: Bowel sounds are normal.      Palpations: Abdomen is soft.   Musculoskeletal:         General: No swelling. Normal range of motion.   Skin:     General: Skin is warm and dry.      Capillary Refill: Capillary  refill takes 2 to 3 seconds.   Neurological:      General: No focal deficit present.      Mental Status: He is alert and oriented to person, place, and time.            Significant Labs:  CBC:  Recent Labs   Lab 12/17/23 0346 12/18/23 0417 12/19/23 0419   WBC 19.84* 18.26* 15.74*   RBC 2.70* 2.52* 2.37*   HGB 8.1* 7.6* 7.2*   HCT 24.1* 23.3* 22.0*    425 423   MCV 89 93 93   MCH 30.0 30.2 30.4   MCHC 33.6 32.6 32.7     BNP:  Recent Labs   Lab 12/13/23  0316 12/15/23  0311 12/18/23  0417   BNP 1,460* 1,312* 1,484*     CMP:  Recent Labs   Lab 12/17/23 0346 12/17/23  1426 12/18/23 0417 12/18/23  1407 12/18/23  2252 12/19/23 0419     101   < > 157* 99 105 124*   CALCIUM 8.3*  8.3*   < > 8.0* 7.9* 8.2* 7.8*   ALBUMIN 1.8*  1.8*   < > 1.7*  1.7* 1.7* 1.7* 1.7*  1.6*   PROT 6.2  --  6.0  --   --  5.9*     136   < > 138 136 137 136   K 4.2  4.2   < > 4.3 4.2 4.3 4.3   CO2 23  23   < > 22* 24 23 23     105   < > 109 105 107 108   BUN 5*  5*   < > 6* 5* 5* 5*   CREATININE 0.5  0.5   < > 0.6 0.5 0.6 0.5   ALKPHOS 253*  --  245*  --   --  219*   ALT 33  --  32  --   --  29   *  --  91*  --   --  76*   BILITOT 1.5*  --  1.3*  --   --  1.1*    < > = values in this interval not displayed.      Coagulation:   Recent Labs   Lab 12/17/23 0346 12/17/23  2134 12/18/23 0417 12/19/23 0419   INR 1.9*  --  1.9* 1.8*   APTT 45.0* 43.4* 48.3* 46.2*     LDH:  Recent Labs   Lab 12/17/23  0346 12/18/23 0417 12/19/23 0419   * 656* 585*     Microbiology:  Microbiology Results (last 7 days)       Procedure Component Value Units Date/Time    Blood culture [[id number]] Collected: 12/10/23 1240    Order Status: Completed Specimen: Blood from Peripheral, Antecubital, Right Updated: 12/15/23 2012     Blood Culture, Routine No growth after 5 days.    Blood culture [[id number]] Collected: 12/10/23 1149    Order Status: Completed Specimen: Blood from Line, Jugular, External Right Updated:  12/15/23 2012     Blood Culture, Routine No growth after 5 days.    Culture, Respiratory with Gram Stain [[id number]] Collected: 12/11/23 1643    Order Status: Completed Specimen: Respiratory from Sputum, Expectorated Updated: 12/13/23 0930     Respiratory Culture Normal respiratory bobby      No S aureus or Pseudomonas isolated.     Gram Stain (Respiratory) <10 epithelial cells per low power field.     Gram Stain (Respiratory) Rare WBC's     Gram Stain (Respiratory) Rare Gram positive cocci            I have reviewed all pertinent labs within the past 24 hours.    Estimated Creatinine Clearance: 170.3 mL/min (based on SCr of 0.5 mg/dL).    Diagnostic Results:  I have reviewed and interpreted all pertinent imaging results/findings within the past 24 hours.  Assessment and Plan:     63 year old male with hx of CAD s/p 3v CABG (unclear anatomy, 2009), ICM with a recent EF of 15-20% s/p ICD (elsa 2009), DM2 (a1c 7.7), HTN, HLD, Vfib on amio who presents to the ED with CC of SOB     Pt was recently admitted to [facility] as a transfer.  He was started on a Lasix gtt and did well.  Started on gDMT and discharged home on  5 with plans to follow up in HTS clinic for ongoing transplant evaluation at another facility.  He says that about a few days ago he started to notice LE swelling.  This turned into Nelson and orthopnea.  He says he can't walk to the bathroom without getting SOB.  He also complains of weight gain.  He takes torsemide 20mg BID at home and was told to trial additional Lasix which he did without any improvement.  He was rx metolazone but has not been filled.  He came to the ED     In the ED he was AF with stable VS on RA.  CBC showing chronic anemia.  CMP notable for acute on chronic CKD with baseline around 2.1 and Cr now 2.6.  BNP elevated.  Lactate neg.  CXR showing pulmonary edema.  I evaluated the pt at the bedside.  Bedside TTE showing CVP >15.  He was subsequently admitted to the CCU for diuresis.      He was continued on his home  and was started on a lasix gtt, which he responded well to overnight (net -1700cc. He feels much better this morning as well. Our transplant coordinators have been working on insurance approval and he is now being transferred to [facility] service for transplant evaluation.     * LVAD (left ventricular assist device) present  -HeartMate 3 Implanted  12/1/2023  as DT  -HTS Primary  -Implanted by Dr. Washington  -Continue Coumadin, dosing by PharmD.  Goal INR 2.0-3.0 . Subtherapeutic today. On Heparin bridge   -Antiplatelets Not on ASA  -LDH is stable overall today. Will continue to monitor daily.  -Speed set at  4900   rpm, LSL 4500 rpm   -Interrogation notable for no events  -ECHO 12/11 AV does not open, IVS midline, LVEDD 6.1, with HM3 speed set to 4900 rpm  -Not listed for OHTx, declined for OHTX due to comorbidities         Procedure: Device Interrogation Including analysis of device parameters  Current Settings: Ventricular Assist Device  Review of device function is stable/unstable stable        12/19/2023     1:00 PM 12/19/2023    11:45 AM 12/19/2023    11:15 AM 12/19/2023    10:00 AM 12/19/2023     9:00 AM 12/19/2023     8:00 AM 12/19/2023     7:00 AM   TXP LVAD INTERROGATIONS   Type    HeartMate3 HeartMate3 HeartMate3 HeartMate3   Flow 4 4 4.1 4 4.1 4.2 4.2   Speed 4900 4900 4900 4900 4900 5000 4900   PI 4.2 4.2 4.2 4.2 4.1 3.3 4   Power ([place]) 3.3 3.3 3.3 3.3 3.3 3.3 3.2   LSL 4500 4500 4500 4500 4500 4500 4500   Pulsatility Intermittent pulse Intermittent pulse Intermittent pulse Intermittent pulse Intermittent pulse Intermittent pulse Intermittent pulse         Cachexia  -Daily caloric intake with tube feeds increased to 2160 calories, 98 g protein    Unilateral complete vocal fold paralysis  -Appreciate ENT's help  -S/P augmentation of paralyzed left vocal cord yesterday    Dysphonia  -Speech following closely  -Appreciate ENT's help. S/P augmentation of paralyzed left vocal  cord 12/18    Moderate malnutrition  -RD and SLP following  -Tolerating tube feeds. Total daily caloric intake increased to 2160  -Plan MBSS tomorrow     Leukocytosis  -WBC peaked at 33k, trending down at 15k. Afebrile  -Concern for septic shock. Was also on hydrocortisone with increasing pressor requirements, now discontinued   -ID consulted, started on empiric micafungin, meropenem, and vanc, since completed  -Cultures were all -ve  -Completed course for possible pneumonia, ID signed off     Adjustment disorder with anxiety  -Continue Remeron 15 mg q HS    Depressed mood  -Psychiatry consulted for depressed mood, SI when left alone  -Recommend sitter when alone (possibly with transition to stepdown).  -Continue Mirtazapine  -Psychology also following        IVÁN (acute kidney injury)  -Nephrology following      Altered mental status  -multifactorial  -likely metabolic encephalopathy +/- icu delirium   -CTH 12/7 negative for acute process  -Continue RRT for metabolic clearance as needed  -EEG done over the weekend no seizures detected, just generalized slowing  -provide frequent re-orientation  -Promote sleep/wake cycle   -Neurology consulted   - Much more awake, continue to encourage and motivate    Acute on chronic combined systolic and diastolic CHF, NYHA class 4  Pt with known ICM with an EF of 25% on home  presented with decompensated HF.      -lRHC 11/14 RA 14, PA 70/35, PCWP 32, CO 4.1, CI 2.1  -Repeat RHC 11/20 RA 20, PA 60/29 (39), PCWP 29, CO 4.6, CI 2.3  -ECHO 11/21 showed EF 15-20%, mild RV dysfunction, mild MR, LVED 6.9   -Home  5   -Home GDMT: Entreso 24/26 BID (on hold 2/2 IVÁN), hydralazine 25 q 8hr, all held prior to LVAD implant  -Current GDMT: Continue Hyd/Isordil, Farxiga. Will transition Valsartan to Entresto and add Aldactone  -Home diuretic: Lasix 40 mg BID   -IABP inserted 11/21 and underwent DT HM3 implant 12/1, chest closed 12/4  -LVAD speed increased to 4900 rpm on 12/7   -ECHO 12/11  AV does not open, IVS midline, LVEDD 6.1, with HM3 speed set to 4900 rpm  -Increased pressor requirements with Giapreza, Ketty, epi, and vaso 12/11. Able to wean off giapreza overnight 12/11. He has been weaned off all pressors and inotropes  -CVP 7, SVO2 46%, CO 5.84, CI 2.86,   -No significant hypotension. Decreased Midodrine to 10 mg tid      PAF (paroxysmal atrial fibrillation)  Known hx of pAF. In sinus rhythm on admission, but 1 run of AF RVR overnight. He spontaneously converted.  - Continue home amiodarone 400mg qd  - Continue AC      Acute renal failure with acute tubular necrosis superimposed on stage 3b chronic kidney disease  IVÁN on CKD2. Baseline Cr of 1.7-2.0.   - Hold ARNi  -Trend BMPs daily   -Nephrology following   -SLED/SCUF for volume removal began on 12/1. He is anuric  -Did not respond to diuretic challenge with 240 mg IV Lasix, 1000 mg Diuril, and 500 mg IV Diamox done 12/14  -Secure chat sent to Nephrology to discuss appropriate timing of HD trial as well as replacing RIJ trialysis cath with a tunneled cath    Type 2 diabetes mellitus without complication, without long-term current use of insulin  -A1c 7.6, not insulin dependent  - Endocrine following, appreciate assistance with blood glucose management    CAD (coronary artery disease)  -S/p 3vCABG in 2009  -Lipitor on hold 2/2 mild transaminitis  -Not on ASA post VAD    Ventricular tachycardia  Hx VT s/p ICD placement (medtronic 2009)  - Continue home amio 400mg qd      Uninterrupted Critical Care/Counseling Time (not including procedures): [place] [id number]  Heart Transplant  [facility]

## 2023-12-19 NOTE — ASSESSMENT & PLAN NOTE
-WBC peaked at 33k, trending down at 15k. Afebrile  -Concern for septic shock. Was also on hydrocortisone with increasing pressor requirements, now discontinued   -ID consulted, started on empiric micafungin, meropenem, and vanc, since completed  -Cultures were all -ve  -Completed course for possible pneumonia, ID signed off

## 2023-12-19 NOTE — PROGRESS NOTES
[facility]  Nephrology  Progress Note    Patient Name: Radha Abbott  MRN: [id number]  Admission Date: 11/9/2023  Hospital Length of Stay: 40 days  Attending Provider: Juventino Bermudez Jr.*   Primary Care Physician: Vasu Kong MD  Principal Problem:LVAD (left ventricular assist device) present    Subjective:     Interval History: Seen on SLED this am. CVP 9. Net +243. Oxygenating well on RA. Off pressors.     Review of patient's allergies indicates:  No Known Allergies  Current Facility-Administered Medications   Medication Frequency    0.9%  NaCl infusion Continuous    0.9%  NaCl infusion Continuous    acetaminophen tablet 650 mg Q6H PRN    albuterol sulfate nebulizer solution 2.5 mg Q4H PRN    amiodarone tablet 400 mg Daily    balsam peru-castor oiL Oint BID    bisacodyL suppository 10 mg Daily PRN    calcium gluconate 1 g in NS IVPB (premixed) PRN    calcium gluconate 1 g in NS IVPB (premixed) PRN    calcium gluconate 1 g in NS IVPB (premixed) PRN    dextrose 10 % infusion Continuous PRN    dextrose 10% bolus 125 mL 125 mL PRN    dextrose 10% bolus 250 mL 250 mL PRN    EPINEPHrine 5 mg in dextrose 5% 250 mL infusion (premix) Continuous    gabapentin 250 mg/5 mL (5 mL) solution 125 mg Q12H    glucagon (human recombinant) injection 1 mg PRN    heparin 25,000 units in dextrose 5% 250 ml (100 units/mL) infusion MINIMAL INTENSITY nomogram - [facility] Continuous    insulin aspart U-100 pen 0-10 Units Q4H PRN    insulin aspart U-100 pen 7 Units 6 times per day    magnesium hydroxide 400 mg/5 ml suspension 2,400 mg Daily PRN    melatonin tablet 6 mg Nightly PRN    midodrine tablet 10 mg TID    mirtazapine tablet 15 mg Nightly    oxyCODONE immediate release tablet 5 mg Q6H PRN    pantoprazole injection 40 mg Daily    polyethylene glycol packet 17 g Daily    potassium chloride 20 mEq in 100 mL IVPB (FOR CENTRAL LINE ADMINISTRATION ONLY) PRN    potassium chloride 20 mEq in 100 mL IVPB (FOR  CENTRAL LINE ADMINISTRATION ONLY) PRN    potassium chloride 40 mEq in 100 mL IVPB (FOR CENTRAL LINE ADMINISTRATION ONLY) PRN    psyllium husk (aspartame) 3.4 gram packet 1 packet Daily    QUEtiapine tablet 50 mg Q6H PRN    senna-docusate 8.6-50 mg per tablet 1 tablet Daily    sodium chloride 0.9% flush 10 mL PRN    [START ON 12/20/2023] warfarin (COUMADIN) tablet 5 mg Daily    warfarin tablet 6 mg Once       Objective:     Vital Signs (Most Recent):  Temp: 97.5 °F (36.4 °C) (12/19/23 0300)  Pulse: 100 (12/19/23 1200)  Resp: (!) 28 (12/19/23 1200)  BP: (!) 82/0 (12/19/23 0800)  SpO2: (!) 90 % (12/19/23 0826) Vital Signs (24h Range):  Temp:  [97.5 °F (36.4 °C)-98 °F (36.7 °C)] 97.5 °F (36.4 °C)  Pulse:  [] 100  Resp:  [21-35] 28  SpO2:  [83 %-97 %] 90 %  BP: (80-84)/(0) 82/0  Arterial Line BP: (70-98)/(55-98) 76/61     Weight: 82.1 kg (181 lb) (12/19/23 0500)  Body mass index is 24.55 kg/m².  Body surface area is 2.04 meters squared.    I/O last 3 completed shifts:  In: 58827.4 [I.V.:1267; NG/GT:1755; IV Piggyback:7434.4]  Out: 9839 [Other:9839]     Physical Exam  Vitals and nursing note reviewed.   Eyes:      Conjunctiva/sclera: Conjunctivae normal.   Cardiovascular:      Comments: LVAD   Pulmonary:      Effort: Pulmonary effort is normal.      Comments: RA  Abdominal:      Palpations: Abdomen is soft.   Musculoskeletal:         General: No swelling.      Right lower leg: No edema.      Left lower leg: No edema.   Skin:     General: Skin is warm.   Neurological:      Mental Status: He is alert and oriented to person, place, and time.   Psychiatric:         Mood and Affect: Mood normal.          Significant Labs:  CBC:   Recent Labs   Lab 12/19/23  0419   WBC 15.74*   RBC 2.37*   HGB 7.2*   HCT 22.0*      MCV 93   MCH 30.4   MCHC 32.7     CMP:   Recent Labs   Lab 12/19/23  0419   *   CALCIUM 7.8*   ALBUMIN 1.7*  1.6*   PROT 5.9*      K 4.3   CO2 23      BUN 5*   CREATININE 0.5    ALKPHOS 219*   ALT 29   AST 76*   BILITOT 1.1*     All labs within the past 24 hours have been reviewed.         Assessment/Plan:     Cardiac/Vascular  * LVAD (left ventricular assist device) present  -management per primary     Renal/  Acute renal failure with acute tubular necrosis superimposed on stage 3b chronic kidney disease  - suspect acute tubular injury secondary to hypotension/cardiogenic shock likely compounded by intra-arterial contrast and anemia with urinary sediment with granular casts  - Plan to hold RRT today   - renal diet/tube feeds when not NPO, volume restriction per primary team  - strict I/O's and daily weights  - renally all dose medications to eGFR  - avoid nephrotoxic agents wean feasible (i.e. NSAIDs, intra-arterial contrast, supra-therapeutic vancomycin levels, etc.)  - Will reeval RRT needs daily         Thank you for your consult. I will follow-up with patient. Please contact us if you have any additional questions.    Yolanda Cai DNP  Nephrology  [facility]

## 2023-12-19 NOTE — ASSESSMENT & PLAN NOTE
Pt with known ICM with an EF of 25% on home  presented with decompensated HF.      -lRHC 11/14 RA 14, PA 70/35, PCWP 32, CO 4.1, CI 2.1  -Repeat RHC 11/20 RA 20, PA 60/29 (39), PCWP 29, CO 4.6, CI 2.3  -ECHO 11/21 showed EF 15-20%, mild RV dysfunction, mild MR, LVED 6.9   -Home  5   -Home GDMT: Entreso 24/26 BID (on hold 2/2 IVÁN), hydralazine 25 q 8hr, all held prior to LVAD implant  -Current GDMT: Continue Hyd/Isordil, Farxiga. Will transition Valsartan to Entresto and add Aldactone  -Home diuretic: Lasix 40 mg BID   -IABP inserted 11/21 and underwent DT HM3 implant 12/1, chest closed 12/4  -LVAD speed increased to 4900 rpm on 12/7   -ECHO 12/11 AV does not open, IVS midline, LVEDD 6.1, with HM3 speed set to 4900 rpm  -Increased pressor requirements with Giapreza, Ketty, epi, and vaso 12/11. Able to wean off giapreza overnight 12/11. He has been weaned off all pressors and inotropes  -CVP 7, SVO2 46%, CO 5.84, CI 2.86,   -No significant hypotension. Decreased Midodrine to 10 mg tid

## 2023-12-19 NOTE — PLAN OF CARE
SICU PLAN OF CARE NOTE    Dx: LVAD (left ventricular assist device) present    Shift Events: NAEON, no problems with CRRT, no VAD alarms, TF at goal    Goals of Care: PT/OT, progress toward stepdown    Neuro: AAO x4, Follows Commands, and Moves All Extremities    Vital Signs: BP (!) 84/0 (BP Location: Right arm, Patient Position: Lying)   Pulse 101   Temp 97.5 °F (36.4 °C) (Oral)   Resp (!) 25   Ht 6' (1.829 m)   Wt 82.1 kg (181 lb)   SpO2 97%   BMI 24.55 kg/m²     Cardiac: NSR-ST 90s-100s    Respiratory: 4L Nasal Cannula satting 97%    Diet: NPO and Tube Feeds    Gtts: Heparin    Urine Output: Anuric      Labs/Accuchecks: q8 RFP/Mag/Ca, daily labs, q4 Accuchecks, q12 SvO2.    Skin: Turned q2 hours and PRN, Immerse mattress in use. Heel boots encouraged, pt preferred to sleep without them, floated heels with pillow. Triad applied to sacral wound.

## 2023-12-19 NOTE — SUBJECTIVE & OBJECTIVE
Interval History: Left vocal cord augmented by ENT yesterday and he is able to talk. Plan MBSS tomorrow. Remains anuric, Was on SLED until ~ 1 PM. CVP 7. Sent secure chat to Nephrology to discuss when HD trial would be appropriate. Needs trialysis cath change - also asking Nephrology if we should have a tunneled cath placed at this point. No significant hypotension - decreased Midodrine to 10 mg tid.  Last BM was last Saturday. Increased Senna doc to 2 tabs bid and will give suppository.    Continuous Infusions:   sodium chloride 0.9% Stopped (12/17/23 2300)    sodium chloride 0.9%      dextrose 10 % in water (D10W)      EPINEPHrine Stopped (12/13/23 2344)    heparin (porcine) in D5W 7 Units/kg/hr (12/19/23 1100)     Scheduled Meds:   amiodarone  400 mg Per NG tube Daily    balsam peru-castor oiL   Topical (Top) BID    gabapentin  125 mg Per NG tube Q12H    insulin aspart U-100  5 Units Subcutaneous 6 times per day    midodrine  10 mg Per NG tube TID    mirtazapine  15 mg Per NG tube Nightly    pantoprazole  40 mg Intravenous Daily    polyethylene glycol  17 g Per NG tube Daily    psyllium husk (aspartame)  1 packet Per NG tube Daily    senna-docusate 8.6-50 mg  1 tablet Per NG tube Daily    [START ON 12/20/2023] warfarin  5 mg Per NG tube Daily    warfarin  6 mg Per NG tube Once     PRN Meds:acetaminophen, albuterol sulfate, bisacodyL, calcium gluconate IVPB, calcium gluconate IVPB, calcium gluconate IVPB, dextrose 10 % in water (D10W), dextrose 10%, dextrose 10%, glucagon (human recombinant), insulin aspart U-100, magnesium hydroxide 400 mg/5 ml, melatonin, oxyCODONE, potassium chloride, potassium chloride, potassium chloride in water **AND** [DISCONTINUED] potassium chloride in water **AND** [DISCONTINUED] potassium chloride in water, QUEtiapine, Flushing PICC/Midline Protocol **AND** [DISCONTINUED] sodium chloride 0.9% **AND** sodium chloride 0.9%    Review of patient's allergies indicates:  No Known  Allergies  Objective:     Vital Signs (Most Recent):  Temp: 97.5 °F (36.4 °C) (12/19/23 0300)  Pulse: 100 (12/19/23 1200)  Resp: (!) 28 (12/19/23 1200)  BP: (!) 82/0 (12/19/23 0800)  SpO2: (!) 90 % (12/19/23 0826) Vital Signs (24h Range):  Temp:  [97.5 °F (36.4 °C)-98 °F (36.7 °C)] 97.5 °F (36.4 °C)  Pulse:  [] 100  Resp:  [21-35] 28  SpO2:  [83 %-97 %] 90 %  BP: (80-84)/(0) 82/0  Arterial Line BP: (70-98)/(55-98) 76/61     Patient Vitals for the past 72 hrs (Last 3 readings):   Weight   12/19/23 0500 82.1 kg (181 lb)   12/18/23 0500 85.7 kg (189 lb)   12/17/23 0500 85.7 kg (189 lb)     Body mass index is 24.55 kg/m².      Intake/Output Summary (Last 24 hours) at 12/19/2023 1348  Last data filed at 12/19/2023 1307  Gross per 24 hour   Intake 5706.64 ml   Output 6289 ml   Net -582.36 ml       Hemodynamic Parameters:       Telemetry: ST       Physical Exam  Constitutional:       Comments: Cachectic, temporal wasting   HENT:      Head: Normocephalic and atraumatic.   Eyes:      Conjunctiva/sclera: Conjunctivae normal.      Pupils: Pupils are equal, round, and reactive to light.   Neck:      Comments: RIJ trialysis line. Do not appreciate elevated JVP  Cardiovascular:      Rate and Rhythm: Normal rate and regular rhythm.      Comments: Smooth VAD hum  Pulmonary:      Effort: Pulmonary effort is normal.      Breath sounds: Normal breath sounds.   Abdominal:      General: Bowel sounds are normal.      Palpations: Abdomen is soft.   Musculoskeletal:         General: No swelling. Normal range of motion.   Skin:     General: Skin is warm and dry.      Capillary Refill: Capillary refill takes 2 to 3 seconds.   Neurological:      General: No focal deficit present.      Mental Status: He is alert and oriented to person, place, and time.            Significant Labs:  CBC:  Recent Labs   Lab 12/17/23  0346 12/18/23  0417 12/19/23  0419   WBC 19.84* 18.26* 15.74*   RBC 2.70* 2.52* 2.37*   HGB 8.1* 7.6* 7.2*   HCT 24.1* 23.3*  22.0*    425 423   MCV 89 93 93   MCH 30.0 30.2 30.4   MCHC 33.6 32.6 32.7     BNP:  Recent Labs   Lab 12/13/23  0316 12/15/23  0311 12/18/23  0417   BNP 1,460* 1,312* 1,484*     CMP:  Recent Labs   Lab 12/17/23  0346 12/17/23  1426 12/18/23  0417 12/18/23  1407 12/18/23  2252 12/19/23 0419     101   < > 157* 99 105 124*   CALCIUM 8.3*  8.3*   < > 8.0* 7.9* 8.2* 7.8*   ALBUMIN 1.8*  1.8*   < > 1.7*  1.7* 1.7* 1.7* 1.7*  1.6*   PROT 6.2  --  6.0  --   --  5.9*     136   < > 138 136 137 136   K 4.2  4.2   < > 4.3 4.2 4.3 4.3   CO2 23  23   < > 22* 24 23 23     105   < > 109 105 107 108   BUN 5*  5*   < > 6* 5* 5* 5*   CREATININE 0.5  0.5   < > 0.6 0.5 0.6 0.5   ALKPHOS 253*  --  245*  --   --  219*   ALT 33  --  32  --   --  29   *  --  91*  --   --  76*   BILITOT 1.5*  --  1.3*  --   --  1.1*    < > = values in this interval not displayed.      Coagulation:   Recent Labs   Lab 12/17/23 0346 12/17/23 2134 12/18/23 0417 12/19/23 0419   INR 1.9*  --  1.9* 1.8*   APTT 45.0* 43.4* 48.3* 46.2*     LDH:  Recent Labs   Lab 12/17/23 0346 12/18/23 0417 12/19/23 0419   * 656* 585*     Microbiology:  Microbiology Results (last 7 days)       Procedure Component Value Units Date/Time    Blood culture [[id number]] Collected: 12/10/23 1240    Order Status: Completed Specimen: Blood from Peripheral, Antecubital, Right Updated: 12/15/23 2012     Blood Culture, Routine No growth after 5 days.    Blood culture [[id number]] Collected: 12/10/23 1149    Order Status: Completed Specimen: Blood from Line, Jugular, External Right Updated: 12/15/23 2012     Blood Culture, Routine No growth after 5 days.    Culture, Respiratory with Gram Stain [[id number]] Collected: 12/11/23 1643    Order Status: Completed Specimen: Respiratory from Sputum, Expectorated Updated: 12/13/23 0954     Respiratory Culture Normal respiratory bobby      No S aureus or Pseudomonas isolated.     Gram Stain  (Respiratory) <10 epithelial cells per low power field.     Gram Stain (Respiratory) Rare WBC's     Gram Stain (Respiratory) Rare Gram positive cocci            I have reviewed all pertinent labs within the past 24 hours.    Estimated Creatinine Clearance: 170.3 mL/min (based on SCr of 0.5 mg/dL).    Diagnostic Results:  I have reviewed and interpreted all pertinent imaging results/findings within the past 24 hours.

## 2023-12-19 NOTE — PROGRESS NOTES
[facility]  Psychology    Treatment Attempt  Patient Name: Radha Abbott   MRN: [id number]      Patient awake, laying with head of bed elevated, wife at bedside. Patient politely explained that he preferred to not meet today. Psychology will attempt again tomorrow.       Blue Hdz, PhD  Dept. of Psychiatry  [facility]

## 2023-12-19 NOTE — ASSESSMENT & PLAN NOTE
-A1c 7.6, not insulin dependent  - Endocrine following, appreciate assistance with blood glucose management

## 2023-12-19 NOTE — NURSING
HTS at bedside for AM rounds. Hemodynamics (CVP 9/ SvO2 46), I/O, and patient assessment discussed. Team dropped midodrine dose due to higher doppler/arterial line pressure to 10mg. Gabapentin ordered for neuropathic pain to the left foot. Team aware no BM since 12/17; plan to give suppository if no BM before 1400 today.

## 2023-12-19 NOTE — ASSESSMENT & PLAN NOTE
The patient is a 63 year old male with complex cardiac history including heart failure with recent LVAD placement. He was intubated during this recent admission and has developed left vocal fold immobility causing dysphonia and aspiration of secretions. He is now s/p bedside injection of Restylane into left vocal fold 12/18.    Okay to continue PO trials per SLP  Follow up outpatient with Dr. Meier

## 2023-12-19 NOTE — PROGRESS NOTES
12/19/23 1307   Treatment   Dialyzer Time (hours) 43.14   BVP (Liters) 378.3 L   CRRT Comments blood returned by primary rn as ordered   CRRT Hourly Documentation   Total UF (Hourly Cleared) (mL) 59     SLED completed. Blood was returned by primary RN as ordered. Machine disinfected at this time.

## 2023-12-19 NOTE — SUBJECTIVE & OBJECTIVE
Interval History: Patient sleeping comfortably this morning. Per family at bedside, voice has improved significantly after injection. No stridor.    Medications:  Continuous Infusions:   sodium chloride 0.9%      sodium chloride 0.9% Stopped (12/17/23 2300)    sodium chloride 0.9%      calcium gluconate 3 g in dextrose 5 % (D5W) 100 mL infusion 15 mL/hr at 12/19/23 0700    dextrose 10 % in water (D10W)      dextrose-sod citrate-citric ac 150 mL/hr at 12/19/23 0700    EPINEPHrine Stopped (12/13/23 2344)    heparin (porcine) in D5W 7 Units/kg/hr (12/19/23 0700)     Scheduled Meds:   amiodarone  400 mg Per NG tube Daily    balsam peru-castor oiL   Topical (Top) BID    insulin aspart U-100  7 Units Subcutaneous 6 times per day    midodrine  15 mg Per NG tube TID    mirtazapine  15 mg Per NG tube Nightly    pantoprazole  40 mg Intravenous Daily    polyethylene glycol  17 g Per NG tube Daily    psyllium husk (aspartame)  1 packet Per NG tube Daily    senna-docusate 8.6-50 mg  1 tablet Per NG tube Daily    warfarin  5 mg Per NG tube Daily     PRN Meds:acetaminophen, albuterol sulfate, bisacodyL, calcium gluconate IVPB, calcium gluconate IVPB, calcium gluconate IVPB, dextrose 10 % in water (D10W), dextrose 10%, dextrose 10%, glucagon (human recombinant), insulin aspart U-100, magnesium hydroxide 400 mg/5 ml, magnesium sulfate IVPB, melatonin, potassium chloride, potassium chloride, potassium chloride in water **AND** [DISCONTINUED] potassium chloride in water **AND** [DISCONTINUED] potassium chloride in water, QUEtiapine, Flushing PICC/Midline Protocol **AND** [DISCONTINUED] sodium chloride 0.9% **AND** sodium chloride 0.9%, sodium phosphate 20.01 mmol in dextrose 5 % (D5W) 250 mL IVPB, sodium phosphate 30 mmol in dextrose 5 % (D5W) 250 mL IVPB, sodium phosphate 39.99 mmol in dextrose 5 % (D5W) 250 mL IVPB     Review of patient's allergies indicates:  No Known Allergies  Objective:     Vital Signs (24h Range):  Temp:  [97.5  °F (36.4 °C)-98 °F (36.7 °C)] 97.5 °F (36.4 °C)  Pulse:  [] 101  Resp:  [20-35] 24  SpO2:  [83 %-97 %] 97 %  BP: (72-84)/(0) 84/0  Arterial Line BP: (69-93)/(57-78) 77/67     Date 12/19/23 0700 - 12/20/23 0659   Shift 0336-2965 0853-5475 4653-4196 24 Hour Total   INTAKE   I.V.(mL/kg) 20.5(0.2)   20.5(0.2)   IV Piggyback 398.2   398.2   Shift Total(mL/kg) 418.7(5.1)   418.7(5.1)   OUTPUT   Shift Total(mL/kg)       Weight (kg) 82.1 82.1 82.1 82.1     Lines/Drains/Airways       Peripherally Inserted Central Catheter Line  Duration             PICC Triple Lumen 12/05/23 1208 right basilic 13 days              Central Venous Catheter Line  Duration             Trialysis (Dialysis) Catheter 12/10/23 1215 right internal jugular 8 days              Drain  Duration                  NG/OG Tube 12/14/23 1041 Left nostril 4 days              Arterial Line  Duration             Arterial Line 12/17/23 1400 Left Brachial 1 day              Line  Duration                  VAD 12/01/23 1015 Left ventricular assist device HeartMate 3 17 days                     Physical Exam  Resting comfortably  No stridor  On nasal cannula     Significant Labs:  CBC:   Recent Labs   Lab 12/19/23 0419   WBC 15.74*   RBC 2.37*   HGB 7.2*   HCT 22.0*      MCV 93   MCH 30.4   MCHC 32.7     Coagulation:   Recent Labs   Lab 12/19/23 0419   LABPROT 18.8*   INR 1.8*   APTT 46.2*       Significant Diagnostics:  None

## 2023-12-19 NOTE — PROGRESS NOTES
[facility] - Surgical Intensive Care  Otorhinolaryngology-Head & Neck Surgery  Progress Note    Subjective:     Post-Op Info:  Procedure(s) (LRB):  CLOSURE, WOUND, STERNUM (N/A)  INSERTION, GRAFT, PERICARDIUM  DRAINAGE, PLEURAL EFFUSION   15 Days Post-Op  Hospital Day: 41     Interval History: Patient sleeping comfortably this morning. Per family at bedside, voice has improved significantly after injection. No stridor.    Medications:  Continuous Infusions:   sodium chloride 0.9%      sodium chloride 0.9% Stopped (12/17/23 2300)    sodium chloride 0.9%      calcium gluconate 3 g in dextrose 5 % (D5W) 100 mL infusion 15 mL/hr at 12/19/23 0700    dextrose 10 % in water (D10W)      dextrose-sod citrate-citric ac 150 mL/hr at 12/19/23 0700    EPINEPHrine Stopped (12/13/23 2344)    heparin (porcine) in D5W 7 Units/kg/hr (12/19/23 0700)     Scheduled Meds:   amiodarone  400 mg Per NG tube Daily    balsam peru-castor oiL   Topical (Top) BID    insulin aspart U-100  7 Units Subcutaneous 6 times per day    midodrine  15 mg Per NG tube TID    mirtazapine  15 mg Per NG tube Nightly    pantoprazole  40 mg Intravenous Daily    polyethylene glycol  17 g Per NG tube Daily    psyllium husk (aspartame)  1 packet Per NG tube Daily    senna-docusate 8.6-50 mg  1 tablet Per NG tube Daily    warfarin  5 mg Per NG tube Daily     PRN Meds:acetaminophen, albuterol sulfate, bisacodyL, calcium gluconate IVPB, calcium gluconate IVPB, calcium gluconate IVPB, dextrose 10 % in water (D10W), dextrose 10%, dextrose 10%, glucagon (human recombinant), insulin aspart U-100, magnesium hydroxide 400 mg/5 ml, magnesium sulfate IVPB, melatonin, potassium chloride, potassium chloride, potassium chloride in water **AND** [DISCONTINUED] potassium chloride in water **AND** [DISCONTINUED] potassium chloride in water, QUEtiapine, Flushing PICC/Midline Protocol **AND** [DISCONTINUED] sodium chloride 0.9% **AND** sodium chloride 0.9%, sodium phosphate 20.01 mmol  in dextrose 5 % (D5W) 250 mL IVPB, sodium phosphate 30 mmol in dextrose 5 % (D5W) 250 mL IVPB, sodium phosphate 39.99 mmol in dextrose 5 % (D5W) 250 mL IVPB     Review of patient's allergies indicates:  No Known Allergies  Objective:     Vital Signs (24h Range):  Temp:  [97.5 °F (36.4 °C)-98 °F (36.7 °C)] 97.5 °F (36.4 °C)  Pulse:  [] 101  Resp:  [20-35] 24  SpO2:  [83 %-97 %] 97 %  BP: (72-84)/(0) 84/0  Arterial Line BP: (69-93)/(57-78) 77/67     Date 12/19/23 0700 - 12/20/23 0659   Shift 4647-2109 2796-9143 0305-6375 24 Hour Total   INTAKE   I.V.(mL/kg) 20.5(0.2)   20.5(0.2)   IV Piggyback 398.2   398.2   Shift Total(mL/kg) 418.7(5.1)   418.7(5.1)   OUTPUT   Shift Total(mL/kg)       Weight (kg) 82.1 82.1 82.1 82.1     Lines/Drains/Airways       Peripherally Inserted Central Catheter Line  Duration             PICC Triple Lumen 12/05/23 1208 right basilic 13 days              Central Venous Catheter Line  Duration             Trialysis (Dialysis) Catheter 12/10/23 1215 right internal jugular 8 days              Drain  Duration                  NG/OG Tube 12/14/23 1041 Left nostril 4 days              Arterial Line  Duration             Arterial Line 12/17/23 1400 Left Brachial 1 day              Line  Duration                  VAD 12/01/23 1015 Left ventricular assist device HeartMate 3 17 days                     Physical Exam  Resting comfortably  No stridor  On nasal cannula     Significant Labs:  CBC:   Recent Labs   Lab 12/19/23 0419   WBC 15.74*   RBC 2.37*   HGB 7.2*   HCT 22.0*      MCV 93   MCH 30.4   MCHC 32.7     Coagulation:   Recent Labs   Lab 12/19/23 0419   LABPROT 18.8*   INR 1.8*   APTT 46.2*       Significant Diagnostics:  None  Assessment/Plan:     Vocal cord paralysis, unilateral complete  The patient is a 63 year old male with complex cardiac history including heart failure with recent LVAD placement. He was intubated during this recent admission and has developed left vocal fold  immobility causing dysphonia and aspiration of secretions. He is now s/p bedside injection of Restylane into left vocal fold 12/18.    Okay to continue PO trials per SLP  Follow up outpatient with Dr. Hardeep Gonsales MD  Otorhinolaryngology-Head & Neck Surgery  [facility] - Surgical Intensive Care

## 2023-12-19 NOTE — CONSULTS
[facility] - Surgical Intensive Care  Wound Care    Patient Name:  Radha Abbott   MRN:  [id number]  Date: 12/18/2023  Diagnosis: LVAD (left ventricular assist device) present    History:     Past Medical History:   Diagnosis Date    CAD (coronary artery disease)     Diabetes mellitus     HFrEF (heart failure with reduced ejection fraction)     ICD (implantable cardioverter-defibrillator) in place     MI, old        Social History     Socioeconomic History    Marital status:    Tobacco Use    Smoking status: Every Day     Current packs/day: 0.50     Types: Cigarettes   Substance and Sexual Activity    Alcohol use: Yes     Comment: rarely    Drug use: No     Social Determinants of Health     Financial Resource Strain: Low Risk  (10/27/2023)    Overall Financial Resource Strain (CARDIA)     Difficulty of Paying Living Expenses: Not hard at all   Food Insecurity: No Food Insecurity (10/27/2023)    Hunger Vital Sign     Worried About Running Out of Food in the Last Year: Never true     Ran Out of Food in the Last Year: Never true   Transportation Needs: No Transportation Needs (10/27/2023)    PRAPARE - Transportation     Lack of Transportation (Medical): No     Lack of Transportation (Non-Medical): No   Physical Activity: Inactive (10/27/2023)    Exercise Vital Sign     Days of Exercise per Week: 0 days     Minutes of Exercise per Session: 0 min   Stress: No Stress Concern Present (10/27/2023)    [employer] [place] [employer] - Occupational Stress Questionnaire     Feeling of Stress : Not at all   Social Connections: Moderately Integrated (10/27/2023)    Social Connection and Isolation Panel [NHANES]     Frequency of Communication with Friends and Family: More than three times a week     Frequency of Social Gatherings with Friends and Family: More than three times a week     Attends Rastafarian Services: More than 4 times per year     Active Member of Clubs or Organizations: No     Attends Club or Organization  Meetings: Never     Marital Status:    Housing Stability: Low Risk  (10/27/2023)    Housing Stability Vital Sign     Unable to Pay for Housing in the Last Year: No     Number of Places Lived in the Last Year: 1     Unstable Housing in the Last Year: No       Precautions:     Allergies as of 11/09/2023    (No Known Allergies)       [facility] Assessment Details/Treatment     Patient seen for wound care consultation. -placed by RN for sacrum.    Reviewed chart for this encounter.   See Flow Sheet for findings.    Pt resting with eyes closed; family present at bedside. L heel clear/ R heel with discomfort reported dry intact blanchable erythema present. Border foam dressings applied- rec to elevate to pillow for pressure redistribution if pt unable to tolerate EHOB boot- but encourage boot- especially at night while asleep. Sacrum with nonblanchable deep purple discoloration- areas of partial thickness tissue loss present within discoloration. Border foam dressing placed with plan for BPCO to support healing along with continued PIP interventions.     RECOMMENDATIONS:  Sacrum- altered skin integrity: !)clean skin with cleansing wipe.  2)cover area of discoloration with BPCO. Perform care twice daily and after each incontinent event.     Discussed POC with patient/ family present- and primary nurse. IP Skin Integrity ENRRIQUE following.  See EMR for orders       Bedside nursing to continue care & monitoring.  Bedside nursing to maintain pressure injury prevention interventions.B heels with border foam dsgs/ pillow if unable to tolerate EHOB. - continue Immerse.  Current documented Gasper score is 15 with a nutrition subscale score of 2. IP nutrition following.   Albumin 1.7  Glucose      12/18/23 1310   WOCN Assessment   WOCN Total Time (mins) 30   Visit Date 12/18/23   Visit Time 1310   WOCN Speciality Wound   Intervention assessed;applied;chart review;orders   Teaching on-going  (pt family present- skin/ wound assmt:  recommendations.)   Skin Interventions   Device Skin Pressure Protection absorbent pad utilized/changed;pressure points protected   Pressure Reduction Devices foam padding utilized;specialty bed utilized  (Immerse surface)   Pressure Reduction Techniques positioned off wounds;weight shift assistance provided;frequent weight shift encouraged;heels elevated off bed   Skin Protection adhesive use limited;silicone foam dressing in place   Positioning   Body Position turned   Head of Bed (HOB) Positioning HOB elevated   Positioning/Transfer Devices pillows   Pressure Injury Prevention    Check Moisture Management Pad Done   Sacral Foam Dressing Apply   Heel protection technique Foam dressing   Heel preventative measures Apply   Check Medical Devices Done        Altered Skin Integrity 12/15/23 1500 Sacral spine #1 Purple or maroon localized area of discolored intact skin or non-intact skin or a blood-filled blister.   Date First Assessed/Time First Assessed: 12/15/23 1500   Altered Skin Integrity Present on Admission - Did Patient arrive to the hospital with altered skin?: suspected hospital acquired  Side: Right  Location: Sacral spine  Wound Number: #1  Is this i...   Wound Image    Description of Altered Skin Integrity Purple or maroon localized area of discolored intact skin or non-intact skin or a blood-filled blister.   Dressing Appearance No dressing   Drainage Amount Scant   Drainage Characteristics/Odor Serosanguineous   Appearance [place]   Periwound Area Dry;Intact   Wound Length (cm) 7.5 cm   Wound Width (cm) 4 cm   Wound Depth (cm) 0.2 cm   Wound Volume (cm^3) 6 cm^3   Wound Surface Area (cm^2) 30 cm^2   Care Cleansed with:  (cleansing wipe.)   Dressing Applied;Silicone;Foam   Dressing Change Due 12/19/23  (pending BPCO)     Photo taken for reference.   R heel- blanchable erythema     L heel- clear.      Will continue to follow as needed/ directed.    Racquel Vallejo BSN, RN, CWOCN  12/18/2023

## 2023-12-19 NOTE — ASSESSMENT & PLAN NOTE
-HeartMate 3 Implanted  12/1/2023  as DT  -HTS Primary  -Implanted by Dr. Washington  -Continue Coumadin, dosing by PharmD.  Goal INR 2.0-3.0 . Subtherapeutic today. On Heparin bridge   -Antiplatelets Not on ASA  -LDH is stable overall today. Will continue to monitor daily.  -Speed set at  4900   rpm, LSL 4500 rpm   -Interrogation notable for no events  -ECHO 12/11 AV does not open, IVS midline, LVEDD 6.1, with HM3 speed set to 4900 rpm  -Not listed for OHTx, declined for OHTX due to comorbidities         Procedure: Device Interrogation Including analysis of device parameters  Current Settings: Ventricular Assist Device  Review of device function is stable/unstable stable        12/19/2023     1:00 PM 12/19/2023    11:45 AM 12/19/2023    11:15 AM 12/19/2023    10:00 AM 12/19/2023     9:00 AM 12/19/2023     8:00 AM 12/19/2023     7:00 AM   TXP LVAD INTERROGATIONS   Type    HeartMate3 HeartMate3 HeartMate3 HeartMate3   Flow 4 4 4.1 4 4.1 4.2 4.2   Speed 4900 4900 4900 4900 4900 5000 4900   PI 4.2 4.2 4.2 4.2 4.1 3.3 4   Power ([place]) 3.3 3.3 3.3 3.3 3.3 3.3 3.2   LSL 4500 4500 4500 4500 4500 4500 4500   Pulsatility Intermittent pulse Intermittent pulse Intermittent pulse Intermittent pulse Intermittent pulse Intermittent pulse Intermittent pulse

## 2023-12-19 NOTE — PLAN OF CARE
Heart Transplant Care Update Note:    Hemodynamics:  CVP: 4  SVO2: 54  CO: 6.65  CI: 3.18  SVR: 842    Wt: 85.7, BSA: 2.09, Hb: 7.6     Plan:  - No changes made overnight    Discussed with HTS Attending      Yudy Stockton MD  Cardiovascular Disease PGY IV  [facility]

## 2023-12-19 NOTE — ASSESSMENT & PLAN NOTE
-Psychiatry consulted for depressed mood, SI when left alone  -Recommend sitter when alone (possibly with transition to stepdown).  -Continue Mirtazapine  -Psychology also following

## 2023-12-19 NOTE — PT/OT/SLP PROGRESS
Speech Language Pathology Treatment    Patient Name:  Radha Abbott   MRN:  [id number]  Admitting Diagnosis: LVAD (left ventricular assist device) present    Recommendations:                 General Recommendations:  ENT evaluation, Dysphagia therapy, and Modified barium swallow study  Diet recommendations:  NPO, Liquid Diet Level: NPO   Aspiration Precautions: Continue alternate means of nutrition and Strict aspiration precautions   General Precautions: Standard, fall, LVAD, sternal  Communication strategies:  provide increased time to answer    Assessment:     Radha Abbott is a 63 y.o. male with an SLP diagnosis of Dysphagia.     Subjective     Awake/alert  Spouse at bedside    Pain/Comfort:  Pain Rating 1: 0/10  Pain Rating Post-Intervention 1: 0/10      Objective:     Has the patient been evaluated by SLP for swallowing?   Yes  Keep patient NPO? Yes     Pt repositioned upright in bed for PO trials. Pt s/p vocal fold augmentation yesterday and voice significantly improved from previous session. He tolerated thin liquids x4 via cup and puree x2 with slight wet vocal quality post all trials. SLP reviewed dysphagia exercises with pt who verbalized understanding and demonstrated one of each exercises. Recommend MBSS to rule out aspiration and determine safest PO diet. Continue alternate mean of nutrition at this time. SLP secure chatted team, awaiting reply for orders.     Goals:   Multidisciplinary Problems       SLP Goals          Problem: SLP    Goal Priority Disciplines Outcome   SLP Goal     SLP    Description: Speech Language Pathology Goals  Goals expected to be met by 12/24    1. Pt will participate in ongoing assessment of swallow function to help determine least restrictive diet                            Plan:     Patient to be seen:  4 x/week   Plan of Care reviewed with:  patient   SLP Follow-Up:  Yes       Discharge recommendations:    TBD      Time Tracking:     SLP Treatment Date:   12/19/23  Speech Start  Time:  0901  Speech Stop Time:  0911     Speech Total Time (min):  10 min    Billable Minutes: Treatment Swallowing Dysfunction 10    12/19/2023

## 2023-12-19 NOTE — PROGRESS NOTES
Update:  SW met with pt and his wife to assess needs.  Pt opened his eyes for SW, but stated that he did not want to talk. Pt had injection for vocal cord, and wife stated that it has helped.  Both pt and his wife reported that they are staying positive and reported no needs at this time.  SW will follow.

## 2023-12-19 NOTE — PLAN OF CARE
SICU PLAN OF CARE NOTE    Dx: LVAD (left ventricular assist device) present    Vital Signs: BP (!) 80/0 (BP Location: Left arm)   Pulse 95   Temp 98 °F (36.7 °C)   Resp (!) 25   Ht 6' (1.829 m)   Wt 85.7 kg (189 lb)   SpO2 (!) 94%   BMI 25.63 kg/m²     SHIFT EVENTS:  Afebrile.NSR. CVP 9,8,7. CRRT; no issues with clottign. ENT at bedside for injection procedure; pt tolerated well. Electrolytes given per orders.     Neuro: Follows Commands and Moves All Extremities    Respiratory: Room Air    Cardiac: NSR  LVAD HM3  Flow: 3.9-4.1  PI:2-4  Pwr: 3.3    Diet: Tube Feeds Novasource at 40cc/hr    Gtts: Heparin     Urine Output: Anuric      Labs/Accuchecks: q4h accuchecks; q8h RFP; daily labs    SKIN NOTE:  Skin: Wound care at bedside; spot found on heel. RN will keep foams or boots. Heels currently elevated on pillows. BPCO to be ordered.

## 2023-12-19 NOTE — ASSESSMENT & PLAN NOTE
Known hx of pAF. In sinus rhythm on admission, but 1 run of AF RVR overnight. He spontaneously converted.  - Continue home amiodarone 400mg qd  - Continue AC

## 2023-12-19 NOTE — ASSESSMENT & PLAN NOTE
-Speech following closely  -Appreciate ENT's help. S/P augmentation of paralyzed left vocal cord 12/18

## 2023-12-19 NOTE — ASSESSMENT & PLAN NOTE
-RD and SLP following  -Tolerating tube feeds. Total daily caloric intake increased to 2160  -Plan MBSS tomorrow

## 2023-12-19 NOTE — PT/OT/SLP PROGRESS
Occupational Therapy   Co-Treatment    Name: Radha Abbott  MRN: [id number]  Admitting Diagnosis:  LVAD (left ventricular assist device) present  15 Days Post-Op    Recommendations:     Discharge Recommendations: High Intensity Therapy  Discharge Equipment Recommendations:   (TBD at next level of care)  Barriers to discharge:   (current level of assistance needed)    Assessment:     Radha Abbott is a 63 y.o. male with a medical diagnosis of LVAD (left ventricular assist device) present.  He presents with progress towards goals as evidenced by ability to sit unsupported for prolonged time while engaged in functional tasks. Pt also able to stand with decreased assistance and improved upright posture.  Performance deficits affecting function are weakness, impaired endurance, decreased upper extremity function, decreased lower extremity function, impaired functional mobility, gait instability, decreased safety awareness, pain, impaired cardiopulmonary response to activity, impaired balance, impaired self care skills. Pt benefits from co-treatment with PT to accommodate pt's activity tolerance and progression with therapy.      Rehab Prognosis:  Good; patient would benefit from acute skilled OT services to address these deficits and reach maximum level of function.       Plan:     Patient to be seen 6 x/week to address the above listed problems via self-care/home management, therapeutic activities, therapeutic exercises, neuromuscular re-education  Plan of Care Expires: 01/02/24  Plan of Care Reviewed with: patient    Subjective     Chief Complaint: R foot pain  Patient/Family Comments/goals: to get better and go home  Pain/Comfort:  Pain Rating 1: 9/10  Location - Side 1: Right  Location - Orientation 1: generalized  Location 1: foot  Pain Addressed 1: Reposition, Distraction  Pain Rating Post-Intervention 1: 9/10    Objective:     Communicated with: RN prior to session.  Patient found supine with blood pressure cuff, pulse  ox (continuous), telemetry, wound vac, arterial line, LVAD, NG tube, PICC line, Trialysis upon OT entry to room.    General Precautions: Standard, fall, LVAD, sternal    Orthopedic Precautions:N/A  Braces: N/A  Respiratory Status: Room air     Occupational Performance:     Bed Mobility:    Patient completed Scooting/Bridging with total assistance  Patient completed Supine to Sit with moderate assistance  Patient completed Sit to Supine with maximal assistance and 2 persons     Functional Mobility/Transfers:  Patient completed Sit <> Stand Transfer with moderate assistance and of 2 persons  with  hand-held assist  two trials from EOB  Functional Mobility: Max A x 2 for sidesteps along EOB    Activities of Daily Living:  Grooming: contact guard assistance for washing face seated EOB  Upper Body Dressing: maximal assistance        AMPA 6 Click ADL: 12    Treatment & Education:  Pt ed on OT POC  Pt ed on VAD management including LVAD sternal precautions; proper anchor placement and driveline securement for DLES integrity; reason for and performance of Self Test; identifying parts including controller, driveline, power cords,   Pt ed on ROM ex's 3x daily for increased overall strength and endurance      Patient left with bed in chair position with all lines intact, call button in reach, and RN notified    GOALS:   Multidisciplinary Problems       Occupational Therapy Goals          Problem: Occupational Therapy    Goal Priority Disciplines Outcome Interventions   Occupational Therapy Goal     OT, PT/OT Ongoing, Progressing    Description: Goals to be met by: 1/2/24     Patient will increase functional independence with ADLs by performing:    UB Dressing with SBA  LE Dressing with Supervision.  Grooming while standing at sink with Supervision.  Toileting from bedside commode with SBA for hygiene and clothing management.   Step transfer to bedside commode with SBA  [place] with VAD management during ADLs                              Time Tracking:     OT Date of Treatment: 12/19/23  OT Start Time: 0905  OT Stop Time: 0929  OT Total Time (min): 24 min    Billable Minutes:Self Care/Home Management 10  Therapeutic Exercise 14               12/19/2023

## 2023-12-20 PROBLEM — N18.9 ACUTE KIDNEY INJURY SUPERIMPOSED ON CHRONIC KIDNEY DISEASE: Status: ACTIVE | Noted: 2023-12-20

## 2023-12-20 PROBLEM — N17.9 ACUTE KIDNEY INJURY SUPERIMPOSED ON CHRONIC KIDNEY DISEASE: Status: ACTIVE | Noted: 2023-12-20

## 2023-12-20 PROBLEM — Z99.2 ACUTE RENAL FAILURE ON DIALYSIS: Status: ACTIVE | Noted: 2023-12-20

## 2023-12-20 PROBLEM — M79.671 RIGHT FOOT PAIN: Status: ACTIVE | Noted: 2023-12-20

## 2023-12-20 PROBLEM — N17.9 ACUTE RENAL FAILURE ON DIALYSIS: Status: ACTIVE | Noted: 2023-12-20

## 2023-12-20 LAB
ALBUMIN SERPL BCP-MCNC: 1.7 G/DL (ref 3.5–5.2)
ALBUMIN SERPL BCP-MCNC: 1.7 G/DL (ref 3.5–5.2)
ALLENS TEST: ABNORMAL
ALP SERPL-CCNC: 219 U/L (ref 55–135)
ALP SERPL-CCNC: 230 U/L (ref 55–135)
ALT SERPL W/O P-5'-P-CCNC: 30 U/L (ref 10–44)
ALT SERPL W/O P-5'-P-CCNC: 32 U/L (ref 10–44)
ANION GAP SERPL CALC-SCNC: 11 MMOL/L (ref 8–16)
APTT PPP: 47 SEC (ref 21–32)
AST SERPL-CCNC: 61 U/L (ref 10–40)
AST SERPL-CCNC: 64 U/L (ref 10–40)
BASOPHILS # BLD AUTO: 0.06 K/UL (ref 0–0.2)
BASOPHILS NFR BLD: 0.4 % (ref 0–1.9)
BILIRUB DIRECT SERPL-MCNC: 0.4 MG/DL (ref 0.1–0.3)
BILIRUB SERPL-MCNC: 0.8 MG/DL (ref 0.1–1)
BILIRUB SERPL-MCNC: 0.9 MG/DL (ref 0.1–1)
BNP SERPL-MCNC: 1130 PG/ML (ref 0–99)
BUN SERPL-MCNC: 31 MG/DL (ref 8–23)
CA-I BLDV-SCNC: 0.93 MMOL/L (ref 1.06–1.42)
CA-I BLDV-SCNC: 1.07 MMOL/L (ref 1.06–1.42)
CA-I BLDV-SCNC: 1.1 MMOL/L (ref 1.06–1.42)
CALCIUM SERPL-MCNC: 8.1 MG/DL (ref 8.7–10.5)
CHLORIDE SERPL-SCNC: 108 MMOL/L (ref 95–110)
CO2 SERPL-SCNC: 22 MMOL/L (ref 23–29)
CREAT SERPL-MCNC: 2 MG/DL (ref 0.5–1.4)
CRP SERPL-MCNC: 166.3 MG/L (ref 0–8.2)
DIFFERENTIAL METHOD BLD: ABNORMAL
EOSINOPHIL # BLD AUTO: 0.1 K/UL (ref 0–0.5)
EOSINOPHIL NFR BLD: 0.5 % (ref 0–8)
ERYTHROCYTE [DISTWIDTH] IN BLOOD BY AUTOMATED COUNT: 19.3 % (ref 11.5–14.5)
EST. GFR  (NO RACE VARIABLE): 37.3 ML/MIN/1.73 M^2
FINAL PATHOLOGIC DIAGNOSIS: NORMAL
GLUCOSE SERPL-MCNC: 179 MG/DL (ref 70–110)
GROSS: NORMAL
HCO3 UR-SCNC: 23.5 MMOL/L (ref 24–28)
HCO3 UR-SCNC: 23.6 MMOL/L (ref 24–28)
HCT VFR BLD AUTO: 22.2 % (ref 40–54)
HCT VFR BLD CALC: 21 %PCV (ref 36–54)
HGB BLD-MCNC: 7.1 G/DL (ref 14–18)
IMM GRANULOCYTES # BLD AUTO: 0.14 K/UL (ref 0–0.04)
IMM GRANULOCYTES NFR BLD AUTO: 0.9 % (ref 0–0.5)
INR PPP: 2 (ref 0.8–1.2)
LDH SERPL L TO P-CCNC: 541 U/L (ref 110–260)
LYMPHOCYTES # BLD AUTO: 1.1 K/UL (ref 1–4.8)
LYMPHOCYTES NFR BLD: 7.4 % (ref 18–48)
Lab: NORMAL
MAGNESIUM SERPL-MCNC: 1.9 MG/DL (ref 1.6–2.6)
MAGNESIUM SERPL-MCNC: 1.9 MG/DL (ref 1.6–2.6)
MCH RBC QN AUTO: 30.7 PG (ref 27–31)
MCHC RBC AUTO-ENTMCNC: 32 G/DL (ref 32–36)
MCV RBC AUTO: 96 FL (ref 82–98)
MONOCYTES # BLD AUTO: 1 K/UL (ref 0.3–1)
MONOCYTES NFR BLD: 6.7 % (ref 4–15)
NEUTROPHILS # BLD AUTO: 12.8 K/UL (ref 1.8–7.7)
NEUTROPHILS NFR BLD: 84.1 % (ref 38–73)
NRBC BLD-RTO: 1 /100 WBC
PCO2 BLDA: 35.4 MMHG (ref 35–45)
PCO2 BLDA: 39.7 MMHG (ref 35–45)
PH SMN: 7.38 [PH] (ref 7.35–7.45)
PH SMN: 7.43 [PH] (ref 7.35–7.45)
PHOSPHATE SERPL-MCNC: 4 MG/DL (ref 2.7–4.5)
PHOSPHATE SERPL-MCNC: 4.2 MG/DL (ref 2.7–4.5)
PLATELET # BLD AUTO: 430 K/UL (ref 150–450)
PMV BLD AUTO: 11.7 FL (ref 9.2–12.9)
PO2 BLDA: 26 MMHG (ref 40–60)
PO2 BLDA: 27 MMHG (ref 40–60)
PO2 BLDA: 28 MMHG (ref 40–60)
POC BE: -1 MMOL/L
POC BE: -1 MMOL/L
POC IONIZED CALCIUM: 1.18 MMOL/L (ref 1.06–1.42)
POC SATURATED O2: 48 % (ref 95–100)
POC SATURATED O2: 50 % (ref 95–100)
POC SATURATED O2: 56 % (ref 95–100)
POC TCO2: 25 MMOL/L (ref 24–29)
POC TCO2: 25 MMOL/L (ref 24–29)
POCT GLUCOSE: 137 MG/DL (ref 70–110)
POCT GLUCOSE: 150 MG/DL (ref 70–110)
POCT GLUCOSE: 169 MG/DL (ref 70–110)
POCT GLUCOSE: 175 MG/DL (ref 70–110)
POCT GLUCOSE: 177 MG/DL (ref 70–110)
POCT GLUCOSE: 99 MG/DL (ref 70–110)
POTASSIUM BLD-SCNC: 4.5 MMOL/L (ref 3.5–5.1)
POTASSIUM SERPL-SCNC: 4.8 MMOL/L (ref 3.5–5.1)
PROT SERPL-MCNC: 6.1 G/DL (ref 6–8.4)
PROT SERPL-MCNC: 6.1 G/DL (ref 6–8.4)
PROTHROMBIN TIME: 20.3 SEC (ref 9–12.5)
RBC # BLD AUTO: 2.31 M/UL (ref 4.6–6.2)
SAMPLE: ABNORMAL
SITE: ABNORMAL
SODIUM BLD-SCNC: 138 MMOL/L (ref 136–145)
SODIUM SERPL-SCNC: 141 MMOL/L (ref 136–145)
WBC # BLD AUTO: 15.22 K/UL (ref 3.9–12.7)

## 2023-12-20 PROCEDURE — 25500020 PHARM REV CODE 255: Performed by: INTERNAL MEDICINE

## 2023-12-20 PROCEDURE — 25000003 PHARM REV CODE 250: Performed by: STUDENT IN AN ORGANIZED HEALTH CARE EDUCATION/TRAINING PROGRAM

## 2023-12-20 PROCEDURE — 99223 1ST HOSP IP/OBS HIGH 75: CPT | Mod: ,,, | Performed by: NURSE PRACTITIONER

## 2023-12-20 PROCEDURE — 97530 THERAPEUTIC ACTIVITIES: CPT

## 2023-12-20 PROCEDURE — 97110 THERAPEUTIC EXERCISES: CPT

## 2023-12-20 PROCEDURE — 93750 INTERROGATION VAD IN PERSON: CPT | Mod: ,,, | Performed by: INTERNAL MEDICINE

## 2023-12-20 PROCEDURE — 27000248 HC VAD-ADDITIONAL DAY

## 2023-12-20 PROCEDURE — 85610 PROTHROMBIN TIME: CPT | Performed by: INTERNAL MEDICINE

## 2023-12-20 PROCEDURE — 96170 HLTH BHV IVNTJ FAM WO PT 1ST: CPT | Mod: ,,, | Performed by: STUDENT IN AN ORGANIZED HEALTH CARE EDUCATION/TRAINING PROGRAM

## 2023-12-20 PROCEDURE — 25000003 PHARM REV CODE 250: Performed by: INTERNAL MEDICINE

## 2023-12-20 PROCEDURE — 25000003 PHARM REV CODE 250: Performed by: PHYSICIAN ASSISTANT

## 2023-12-20 PROCEDURE — 82803 BLOOD GASES ANY COMBINATION: CPT

## 2023-12-20 PROCEDURE — 92611 MOTION FLUOROSCOPY/SWALLOW: CPT

## 2023-12-20 PROCEDURE — 94761 N-INVAS EAR/PLS OXIMETRY MLT: CPT | Mod: XB

## 2023-12-20 PROCEDURE — 25000242 PHARM REV CODE 250 ALT 637 W/ HCPCS: Performed by: INTERNAL MEDICINE

## 2023-12-20 PROCEDURE — 25000003 PHARM REV CODE 250: Performed by: NURSE PRACTITIONER

## 2023-12-20 PROCEDURE — 83735 ASSAY OF MAGNESIUM: CPT | Performed by: INTERNAL MEDICINE

## 2023-12-20 PROCEDURE — 63600175 PHARM REV CODE 636 W HCPCS: Performed by: PHYSICIAN ASSISTANT

## 2023-12-20 PROCEDURE — 80053 COMPREHEN METABOLIC PANEL: CPT | Performed by: INTERNAL MEDICINE

## 2023-12-20 PROCEDURE — 99291 CRITICAL CARE FIRST HOUR: CPT | Mod: FS,,, | Performed by: PHYSICIAN ASSISTANT

## 2023-12-20 PROCEDURE — 80076 HEPATIC FUNCTION PANEL: CPT | Performed by: INTERNAL MEDICINE

## 2023-12-20 PROCEDURE — 84100 ASSAY OF PHOSPHORUS: CPT | Performed by: INTERNAL MEDICINE

## 2023-12-20 PROCEDURE — 82330 ASSAY OF CALCIUM: CPT | Performed by: STUDENT IN AN ORGANIZED HEALTH CARE EDUCATION/TRAINING PROGRAM

## 2023-12-20 PROCEDURE — 99900035 HC TECH TIME PER 15 MIN (STAT)

## 2023-12-20 PROCEDURE — 25000003 PHARM REV CODE 250

## 2023-12-20 PROCEDURE — C9113 INJ PANTOPRAZOLE SODIUM, VIA: HCPCS | Performed by: PHYSICIAN ASSISTANT

## 2023-12-20 PROCEDURE — 99233 SBSQ HOSP IP/OBS HIGH 50: CPT | Mod: ,,, | Performed by: NURSE PRACTITIONER

## 2023-12-20 PROCEDURE — 86140 C-REACTIVE PROTEIN: CPT | Performed by: STUDENT IN AN ORGANIZED HEALTH CARE EDUCATION/TRAINING PROGRAM

## 2023-12-20 PROCEDURE — 85025 COMPLETE CBC W/AUTO DIFF WBC: CPT | Performed by: INTERNAL MEDICINE

## 2023-12-20 PROCEDURE — 94668 MNPJ CHEST WALL SBSQ: CPT

## 2023-12-20 PROCEDURE — 83615 LACTATE (LD) (LDH) ENZYME: CPT | Performed by: STUDENT IN AN ORGANIZED HEALTH CARE EDUCATION/TRAINING PROGRAM

## 2023-12-20 PROCEDURE — 97535 SELF CARE MNGMENT TRAINING: CPT

## 2023-12-20 PROCEDURE — 20000000 HC ICU ROOM

## 2023-12-20 PROCEDURE — 85730 THROMBOPLASTIN TIME PARTIAL: CPT | Performed by: INTERNAL MEDICINE

## 2023-12-20 PROCEDURE — 83880 ASSAY OF NATRIURETIC PEPTIDE: CPT | Performed by: STUDENT IN AN ORGANIZED HEALTH CARE EDUCATION/TRAINING PROGRAM

## 2023-12-20 PROCEDURE — 25000003 PHARM REV CODE 250: Performed by: THORACIC SURGERY (CARDIOTHORACIC VASCULAR SURGERY)

## 2023-12-20 PROCEDURE — A9698 NON-RAD CONTRAST MATERIALNOC: HCPCS | Performed by: INTERNAL MEDICINE

## 2023-12-20 RX ORDER — MIDODRINE HYDROCHLORIDE 5 MG/1
15 TABLET ORAL 3 TIMES DAILY
Status: DISCONTINUED | OUTPATIENT
Start: 2023-12-20 | End: 2023-12-25

## 2023-12-20 RX ORDER — ACETAMINOPHEN 500 MG
1000 TABLET ORAL 3 TIMES DAILY
Status: DISCONTINUED | OUTPATIENT
Start: 2023-12-20 | End: 2024-01-24 | Stop reason: HOSPADM

## 2023-12-20 RX ORDER — SODIUM CHLORIDE 9 MG/ML
INJECTION, SOLUTION INTRAVENOUS ONCE
Status: COMPLETED | OUTPATIENT
Start: 2023-12-20 | End: 2023-12-21

## 2023-12-20 RX ORDER — LACTULOSE 10 G/15ML
20 SOLUTION ORAL 3 TIMES DAILY
Status: DISCONTINUED | OUTPATIENT
Start: 2023-12-20 | End: 2023-12-20

## 2023-12-20 RX ORDER — VENLAFAXINE 37.5 MG/1
37.5 TABLET ORAL DAILY
Status: DISCONTINUED | OUTPATIENT
Start: 2023-12-20 | End: 2024-01-13

## 2023-12-20 RX ADMIN — SENNOSIDES AND DOCUSATE SODIUM 2 TABLET: 8.6; 5 TABLET ORAL at 08:12

## 2023-12-20 RX ADMIN — GABAPENTIN 125 MG: 250 SOLUTION ORAL at 09:12

## 2023-12-20 RX ADMIN — Medication: at 09:12

## 2023-12-20 RX ADMIN — WARFARIN SODIUM 5 MG: 5 TABLET ORAL at 04:12

## 2023-12-20 RX ADMIN — CALCIUM GLUCONATE 1 G: 20 INJECTION, SOLUTION INTRAVENOUS at 03:12

## 2023-12-20 RX ADMIN — AMIODARONE HYDROCHLORIDE 400 MG: 200 TABLET ORAL at 08:12

## 2023-12-20 RX ADMIN — INSULIN ASPART 2 UNITS: 100 INJECTION, SOLUTION INTRAVENOUS; SUBCUTANEOUS at 04:12

## 2023-12-20 RX ADMIN — INSULIN ASPART 2 UNITS: 100 INJECTION, SOLUTION INTRAVENOUS; SUBCUTANEOUS at 07:12

## 2023-12-20 RX ADMIN — BARIUM SULFATE 20 ML: 0.81 POWDER, FOR SUSPENSION ORAL at 10:12

## 2023-12-20 RX ADMIN — ACETAMINOPHEN 1000 MG: 500 TABLET ORAL at 02:12

## 2023-12-20 RX ADMIN — INSULIN ASPART 2 UNITS: 100 INJECTION, SOLUTION INTRAVENOUS; SUBCUTANEOUS at 08:12

## 2023-12-20 RX ADMIN — PANTOPRAZOLE SODIUM 40 MG: 40 INJECTION, POWDER, FOR SOLUTION INTRAVENOUS at 08:12

## 2023-12-20 RX ADMIN — MIDODRINE HYDROCHLORIDE 10 MG: 5 TABLET ORAL at 08:12

## 2023-12-20 RX ADMIN — QUETIAPINE FUMARATE 50 MG: 25 TABLET ORAL at 11:12

## 2023-12-20 RX ADMIN — VENLAFAXINE 37.5 MG: 37.5 TABLET ORAL at 11:12

## 2023-12-20 RX ADMIN — INSULIN ASPART 5 UNITS: 100 INJECTION, SOLUTION INTRAVENOUS; SUBCUTANEOUS at 08:12

## 2023-12-20 RX ADMIN — Medication: at 11:12

## 2023-12-20 RX ADMIN — GABAPENTIN 125 MG: 250 SOLUTION ORAL at 08:12

## 2023-12-20 RX ADMIN — ACETAMINOPHEN 1000 MG: 500 TABLET ORAL at 09:12

## 2023-12-20 RX ADMIN — MIDODRINE HYDROCHLORIDE 15 MG: 5 TABLET ORAL at 09:12

## 2023-12-20 RX ADMIN — INSULIN ASPART 5 UNITS: 100 INJECTION, SOLUTION INTRAVENOUS; SUBCUTANEOUS at 07:12

## 2023-12-20 RX ADMIN — MIDODRINE HYDROCHLORIDE 15 MG: 5 TABLET ORAL at 02:12

## 2023-12-20 RX ADMIN — INSULIN ASPART 5 UNITS: 100 INJECTION, SOLUTION INTRAVENOUS; SUBCUTANEOUS at 11:12

## 2023-12-20 RX ADMIN — POLYETHYLENE GLYCOL 3350 17 G: 17 POWDER, FOR SOLUTION ORAL at 08:12

## 2023-12-20 RX ADMIN — INSULIN ASPART 5 UNITS: 100 INJECTION, SOLUTION INTRAVENOUS; SUBCUTANEOUS at 04:12

## 2023-12-20 RX ADMIN — MIRTAZAPINE 15 MG: 15 TABLET, FILM COATED ORAL at 09:12

## 2023-12-20 NOTE — CARE UPDATE
SICU PLAN OF CARE NOTE    Dx: LVAD (left ventricular assist device) present        Neuro: AAO x4, Follows Commands, and Moves All Extremities    Cardiac: NSR    Respiratory: Room Air    Gtts: Heparin    Urine Output: Anuric     Diet: Tube Feeds @ 45cc/hr        Labs/Accuchecks: Q4 accuchecks, daily labs per MD order     Skin:  Patient turned Q2, mattress inflated, and bed working correctly. See flowsheet for skin breakdown documentation.     Shift Events:  See flowsheet for further assessment/details. No new orders received, will continue to monitor.

## 2023-12-20 NOTE — PLAN OF CARE
Recommendations       1.) Recommend continuing TF of Novasource Renal to goal rate of 45ml/hr to provide 2160 kcal, 98g PRO, and 774ml FF- FWF per MD (100% of EEN/EPN)- this is w/in the NIH guidelines for cachexia.                 - monitor for s/s of intolerance such as residuals >500ml, n/v/d, or abdominal distension.                   - consider adding BenePRO BID if CRRT continues (PRO needs will be higher), provides 12g PRO.                  - RD to consider increasing if pt tolerates.      2. Recommend to re-evaluate bowel regimen to help aid in constipation; add fiber is cases of diarrhea.       3. RD to monitor wt, tolerance, skin, labs.      Goals: Meet % of EEN/EPN by RD f/u date  Nutrition Goal Status: goal met  Communication of RD Recs:  (POC)

## 2023-12-20 NOTE — PT/OT/SLP PROGRESS
Speech Language Pathology Treatment    Patient Name:  Radha Abbott   MRN:  [id number]  Admitting Diagnosis: LVAD (left ventricular assist device) present    Recommendations:                 General Recommendations:  Dysphagia therapy  Diet recommendations:  NPO, Liquid Diet Level: NPO   Aspiration Precautions: Strict aspiration precautions   General Precautions: Standard, aspiration, fall, LVAD, NPO, sternal  Communication strategies:  none    Assessment:     Rdaha Abbott is a 63 y.o. male with an SLP diagnosis of Dysphagia.     Subjective     Awake/alert   Spouse at bedside    Pain/Comfort:  Pain Rating 1: 0/10  Pain Rating Post-Intervention 1: 0/10    Respiratory Status: Room air    Objective:     Has the patient been evaluated by SLP for swallowing?   Yes  Keep patient NPO? Yes     Pt in bed with family at bedside throughout session. SLP discussed MBSS results, silent aspiration, dysphagia exercises and POC with pt and family. Images from MBSS showing silent aspiration of liquid and puree consistencies reviewed with family along with basic swallow anatomy. They asked appropriate questions and verbalized understanding of information. Recommend NPO diet with strict aspiration precautions given pt SILENTLY ASPIRATES all consistencies.      Goals:   Multidisciplinary Problems       SLP Goals          Problem: SLP    Goal Priority Disciplines Outcome   SLP Goal     SLP    Description: Speech Language Pathology Goals  Goals expected to be met by 12/24    1. Pt will participate in ongoing assessment of swallow function to help determine least restrictive diet                            Plan:     Patient to be seen:  4 x/week     Plan of Care reviewed with:  patient   SLP Follow-Up:  Yes       Discharge recommendations:  High Intensity Therapy       Time Tracking:     SLP Treatment Date:   12/20/23  Speech Start Time:  1301  Speech Stop Time:  1309     Speech Total Time (min):  8 min    Billable Minutes: Self Care/Home  Management Training 8    12/20/2023

## 2023-12-20 NOTE — PROCEDURES
Modified Barium Swallow    Patient Name:  Radha Abbott   MRN:  [id number]      Recommendations:     Recommendations:                General Recommendations:  Dysphagia therapy  Diet recommendations:  NPO, NPO   Aspiration Precautions: Strict aspiration precautions   General Precautions: Standard, fall, LVAD  Communication strategies:  none    Referral     Reason for Referral  Patient was referred for a Modified Barium Swallow Study to assess the efficiency of his/her swallow function, rule out aspiration and make recommendations regarding safe dietary consistencies, effective compensatory strategies, and safe eating environment.     Diagnosis: LVAD (left ventricular assist device) present       History:     Past Medical History:   Diagnosis Date    CAD (coronary artery disease)     Diabetes mellitus     HFrEF (heart failure with reduced ejection fraction)     ICD (implantable cardioverter-defibrillator) in place     MI, old        Objective:     Current Respiratory Status: 12/20/23    Alert: yes    Cooperative: yes    Follows Directions: yes    Visualization  Patient was seen in the lateral view    Oral Peripheral Examination  Oral Musculature: WFL, general weakness  Dentition: present and adequate  Secretion Management: oral holding  Volitional Cough: weak  Volitional Swallow: delayed initiation  Voice Prior to PO Intake: weak; hoearse breathy dysphonic    Consistencies Assessed  Thin 20 ml tsp and cup edge  Nectar thick 30 ml tsp  Honey thick10 ml tsp   Puree 10 ml    Oral Preparation/Oral Phase  Oral residue present post swallow  Decreased base of tongue mobility  No presence of naso-pharyngeal reflux    Pharyngeal Phase   Pt with swallow delay with trigger at the level of the pyriforms pharyngeal pooling present  Pt with reduced BOT retraction    Pt with reduced hyolaryngeal elevation and excursion patterns    Pt with incomplete epiglottic inversion patterns    Pt with penetration during and after the swallow with  thin consistencies.    Pt with  silent aspiration during and after with all consistencies consistency    Pt without sensory response/airway protective response    Pt warranted cued throat clear or cough to eject material from laryngeal vestibule    Penetrated material observed to rest upon vocal folds with high aspiration risk/likelihood    Strategies attempted (chin tuck) were ineffective in eliminating or reducing the occurrence of penetration/aspiration. Chin tuck resulted in ongoing aspiration of nectar thick consistencies,     There was  presence of valleculae and pyriform sinus residue/stasis with all consistencies trialed      Cervical Esophageal Phase  UES appeared to accommodate all bolus types without stasis or retrograde movement observed     Assessment:     Impressions    Patient demonstrates severe Oropharyngeal  dysphagia characterized by delayed swallow initiation, reduced epiglottic inversion patterns, reduced hyloaryngeal elevation, resulting in silent aspiration of thin, nectar, honey and pureed solids  . Pt with penetration of pyriform and vellecula residue during cued second swallows to clear residue. Pt with moderate pyrifrom and vellecula residue with thin, nectar and honey thick liquids. Pt requiring cued second swallow to clear residue. Chin tuck was ineffective in preventing aspiration. Cued throat clear/cough was partially effective in ejecting aspirated from the airway. Pt remains at high risk for aspiration related complications given his overall medical status/weakness.     Prognosis: Fair    Barriers:  Fatigue    Dysphonia     Plan  Recommend pt remain NPO with ongoing alternative means of nutrition   Continue swallow exercises   SLP will continue to follow     Education  Results were discussed with patient. Results were discussed with Medical Team who was in agreement with plan.   - Supervising SLP returning to pt room following study to provide education to family.   Goals:    Multidisciplinary Problems       SLP Goals          Problem: SLP    Goal Priority Disciplines Outcome   SLP Goal     SLP    Description: Speech Language Pathology Goals  Goals expected to be met by 12/24    1. Pt will participate in ongoing assessment of swallow function to help determine least restrictive diet                            Plan:   Patient to be seen:  Therapy Frequency: 4 x/week   Plan of Care expires:   12/24  Plan of Care reviewed with:  patient        Discharge recommendations:  High Intensity Therapy tbd  Barriers to Discharge:  Level of Skilled Assistance Needed     Time Tracking:   SLP Treatment Date:   12/20/23  Speech Start Time:  0953  Speech Stop Time:  1020     Speech Total Time (min):  27 min    12/20/2023

## 2023-12-20 NOTE — ASSESSMENT & PLAN NOTE
-Neuropathic pain to plantar portion of right foot  -Continue gabapentin 125 mg BID   -Added venlafaxine

## 2023-12-20 NOTE — NURSING
SICU PLAN OF CARE NOTE    Dx: LVAD (left ventricular assist device) present    Shift Events: VSS, patient failed MBSS today remains NPO. Heparin gtt turned off due to patient INR being therapeutic.Pt had brief hypotensive episode during shift MAPs 55-60 x1 hr, HTS notified midodrine increased to 15mg. IR consulted for tunneled HD cath. Plan is for HD trial tonight.     Neuro: AAO x4, follows commands and moves all extremities     Cardiac: NSR 80s-90s, VAD hum  LVAD HM 3 speed 4900, flows 3.9-4.1    Respiratory: Room Air    GI: NPO, TF @45cc/hr, BM x2 today     : Anuric 0 cc/shift    Labs/Accuchecks: daily labs, accuchecks Q4    Skin: No new skin breakdown noted during shift, pt turned and repositioned frequently throughout shift. Pillows in use, foams in place, immerse bed in use. Bed plugged in, wheels locked, call light in reach. Pt and family updated on plan of care and verbalize understanding.

## 2023-12-20 NOTE — ASSESSMENT & PLAN NOTE
Pt with known ICM with an EF of 25% on home  presented with decompensated HF.      -lRHC 11/14 RA 14, PA 70/35, PCWP 32, CO 4.1, CI 2.1  -Repeat RHC 11/20 RA 20, PA 60/29 (39), PCWP 29, CO 4.6, CI 2.3  -ECHO 11/21 showed EF 15-20%, mild RV dysfunction, mild MR, LVED 6.9   -Home  5   -Home GDMT: Entreso 24/26 BID (on hold 2/2 IVÁN), hydralazine 25 q 8hr, all held prior to LVAD implant  -Current GDMT: Continue Hyd/Isordil, Farxiga. Will transition Valsartan to Entresto and add Aldactone  -Home diuretic: Lasix 40 mg BID   -IABP inserted 11/21 and underwent DT HM3 implant 12/1, chest closed 12/4  -LVAD speed increased to 4900 rpm on 12/7   -ECHO 12/11 AV does not open, IVS midline, LVEDD 6.1, with HM3 speed set to 4900 rpm  -Increased pressor requirements with Giapreza, Ketty, epi, and vaso 12/11. Able to wean off giapreza overnight 12/11. He has been weaned off all pressors and inotropes  -CVP 11, SVO2 48%, CO 6.5, CI 3.2,   -No significant hypotension. Decreased Midodrine to 10 mg tid

## 2023-12-20 NOTE — PROGRESS NOTES
12/20/2023  Alondra Dykes    Current provider:  Juventino Bermudez Jr.*    Device interrogation:      12/20/2023     8:00 AM 12/20/2023     7:00 AM 12/20/2023     6:00 AM 12/20/2023     5:00 AM 12/20/2023     4:00 AM 12/20/2023     3:00 AM 12/20/2023     2:00 AM   TXP LVAD INTERROGATIONS   Type HeartMate3 HeartMate3 HeartMate3  HeartMate3 HeartMate3 HeartMate3   Flow 4 4 4.1 4.1 4 4 4.1   Speed 4900 4900 4900 4900 4900 4900 4900   PI 4.2 4.3 3.5 3.5 3.3 3.5 3.3   Power ([place]) 3.3 3.2 3.2 3.2 3.3 3.2 3.2   LSL 4500 4500 4500 4500 4500 4500 4500   Pulsatility Intermittent pulse Intermittent pulse Intermittent pulse Intermittent pulse Intermittent pulse Intermittent pulse Intermittent pulse          Rounded on [facility] to ensure all mechanical assist device settings (IABP or VAD) were appropriate and all parameters were within limits.  I was able to ensure all back up equipment was present, the staff had no issues, and the Perfusion Department daily rounding was complete.      For implantable VADs: Interrogation of Ventricular assist device was performed with analysis of device parameters and review of device function. I have personally reviewed the interrogation findings and agree with findings as stated.     In emergency, the nursing units have been notified to contact the perfusion department either by:  Calling [phone] from 630am to 4pm Mon thru Fri, utilizing the On-Call Finder functionality of Epic and searching for Perfusion, or by contacting the hospital  from 4pm to 630am and on weekends and asking to speak with the perfusionist on call.    8:23 AM

## 2023-12-20 NOTE — ASSESSMENT & PLAN NOTE
-RD and SLP following  -Tolerating tube feeds. Total daily caloric intake increased to 2160  -Plan MBSS today

## 2023-12-20 NOTE — SUBJECTIVE & OBJECTIVE
Interval History: NAEON. Plan for MBSS today. Working with PT/OT. Mobility limited due to neuropathic pain to right foot, will add venlafaxine to gabapentin for additional pain relief. \Remains constipated despite receiving suppository yesterday. Will check KUB. CVP 11, remains anuric. Nephrology following, will do HD trial today. IR consulted for tunneled trialysis line.     Continuous Infusions:   sodium chloride 0.9% Stopped (12/17/23 2300)    sodium chloride 0.9%      dextrose 10 % in water (D10W)      EPINEPHrine Stopped (12/13/23 2344)     Scheduled Meds:   acetaminophen  1,000 mg Per NG tube TID    amiodarone  400 mg Per NG tube Daily    balsam peru-castor oiL   Topical (Top) BID    gabapentin  125 mg Per NG tube Q12H    insulin aspart U-100  5 Units Subcutaneous 6 times per day    midodrine  10 mg Per NG tube TID    mirtazapine  15 mg Per NG tube Nightly    pantoprazole  40 mg Intravenous Daily    polyethylene glycol  17 g Per NG tube Daily    psyllium husk (aspartame)  1 packet Per NG tube Daily    senna-docusate 8.6-50 mg  2 tablet Per NG tube Daily    venlafaxine  37.5 mg Per G Tube Daily    warfarin  5 mg Per NG tube Daily     PRN Meds:albuterol sulfate, bisacodyL, calcium gluconate IVPB, calcium gluconate IVPB, calcium gluconate IVPB, dextrose 10 % in water (D10W), dextrose 10%, dextrose 10%, glucagon (human recombinant), insulin aspart U-100, magnesium hydroxide 400 mg/5 ml, melatonin, oxyCODONE, potassium chloride, potassium chloride, potassium chloride in water **AND** [DISCONTINUED] potassium chloride in water **AND** [DISCONTINUED] potassium chloride in water, QUEtiapine, Flushing PICC/Midline Protocol **AND** [DISCONTINUED] sodium chloride 0.9% **AND** sodium chloride 0.9%    Review of patient's allergies indicates:  No Known Allergies  Objective:     Vital Signs (Most Recent):  Temp: 97.8 °F (36.6 °C) (12/20/23 0700)  Pulse: 98 (12/20/23 0924)  Resp: (!) 25 (12/20/23 0924)  BP: (!) 80/0 (12/20/23  0700)  SpO2: (!) 94 % (12/20/23 0300) Vital Signs (24h Range):  Temp:  [97.7 °F (36.5 °C)-98 °F (36.7 °C)] 97.8 °F (36.6 °C)  Pulse:  [] 98  Resp:  [21-40] 25  SpO2:  [94 %-97 %] 94 %  BP: (78-80)/(0) 80/0  Arterial Line BP: ()/() 83/54     Patient Vitals for the past 72 hrs (Last 3 readings):   Weight   12/19/23 0500 82.1 kg (181 lb)   12/18/23 0500 85.7 kg (189 lb)       Body mass index is 24.55 kg/m².      Intake/Output Summary (Last 24 hours) at 12/20/2023 1127  Last data filed at 12/20/2023 0800  Gross per 24 hour   Intake 1255.64 ml   Output 79 ml   Net 1176.64 ml         Hemodynamic Parameters:       Telemetry: ST       Physical Exam  Constitutional:       Comments: Cachectic, temporal wasting   HENT:      Head: Normocephalic and atraumatic.   Eyes:      Conjunctiva/sclera: Conjunctivae normal.      Pupils: Pupils are equal, round, and reactive to light.   Neck:      Comments: RIJ trialysis line. Do not appreciate elevated JVP  Cardiovascular:      Rate and Rhythm: Normal rate and regular rhythm.      Comments: Smooth VAD hum  Pulmonary:      Effort: Pulmonary effort is normal.      Breath sounds: Normal breath sounds.   Abdominal:      General: Bowel sounds are normal.      Palpations: Abdomen is soft.   Musculoskeletal:         General: No swelling. Normal range of motion.   Skin:     General: Skin is warm and dry.      Capillary Refill: Capillary refill takes 2 to 3 seconds.   Neurological:      General: No focal deficit present.      Mental Status: He is alert and oriented to person, place, and time.            Significant Labs:  CBC:  Recent Labs   Lab 12/18/23  0417 12/19/23  0419 12/20/23  0258   WBC 18.26* 15.74* 15.22*   RBC 2.52* 2.37* 2.31*   HGB 7.6* 7.2* 7.1*   HCT 23.3* 22.0* 22.2*    423 430   MCV 93 93 96   MCH 30.2 30.4 30.7   MCHC 32.6 32.7 32.0       BNP:  Recent Labs   Lab 12/15/23  0311 12/18/23  0417 12/20/23  0258   BNP 1,312* 1,484* 1,130*       CMP:  Recent  Labs   Lab 12/18/23 2252 12/19/23 0419 12/20/23  0258 12/20/23  0731    124*  --  179*   CALCIUM 8.2* 7.8*  --  8.1*   ALBUMIN 1.7* 1.7*  1.6* 1.7* 1.7*   PROT  --  5.9* 6.1 6.1    136  --  141   K 4.3 4.3  --  4.8   CO2 23 23  --  22*    108  --  108   BUN 5* 5*  --  31*   CREATININE 0.6 0.5  --  2.0*   ALKPHOS  --  219* 230* 219*   ALT  --  29 32 30   AST  --  76* 64* 61*   BILITOT  --  1.1* 0.9 0.8        Coagulation:   Recent Labs   Lab 12/18/23 0417 12/19/23 0419 12/20/23  0258   INR 1.9* 1.8* 2.0*   APTT 48.3* 46.2* 47.0*       LDH:  Recent Labs   Lab 12/18/23 0417 12/19/23 0419 12/20/23  0258   * 585* 541*       Microbiology:  Microbiology Results (last 7 days)       Procedure Component Value Units Date/Time    Fungus culture [[id number]] Collected: 12/06/23 1532    Order Status: Completed Specimen: Body Fluid from Lung, Right Updated: 12/20/23 0949     Fungus (Mycology) Culture Culture in progress      No fungus isolated after 2 weeks    Narrative:      Bronchial Wash    Blood culture [[id number]] Collected: 12/10/23 1240    Order Status: Completed Specimen: Blood from Peripheral, Antecubital, Right Updated: 12/15/23 2012     Blood Culture, Routine No growth after 5 days.    Blood culture [[id number]] Collected: 12/10/23 1149    Order Status: Completed Specimen: Blood from Line, Jugular, External Right Updated: 12/15/23 2012     Blood Culture, Routine No growth after 5 days.            I have reviewed all pertinent labs within the past 24 hours.    Estimated Creatinine Clearance: 42.6 mL/min (A) (based on SCr of 2 mg/dL (H)).    Diagnostic Results:  I have reviewed and interpreted all pertinent imaging results/findings within the past 24 hours.

## 2023-12-20 NOTE — PROGRESS NOTES
"[facility]  Psychology  Consult Note    Family Support  Patient Name: Radha Abbott   MRN: [id number]   Date: 12/20/2023  Present in Session: Patient's wife, Arlyn    Session Summary: Patient asleep when I arrived and did not rouse to 3 verbal prompts. Notified RN.    Met with patient's wife, Arlyn. Arlyn described patient's hospitalization as stressful and "tiring." She expressed appreciation for care provided at [facility] and stated that communication with the medical team has been good. Validated and normalized distress. Encouraged self-care (hydration, nutrition, physical activity, sleep).    Plan: Psychology will continue to follow.      Length of Service: 17 minutes       Blue Hdz, PhD  Dept. of Psychiatry  [facility]    "

## 2023-12-20 NOTE — PT/OT/SLP PROGRESS
Physical Therapy Treatment    Patient Name:  Radha Abbott   MRN:  [id number]  Admit Date: 11/9/2023  Admitting Diagnosis:  LVAD (left ventricular assist device) present   Length of Stay: 41 days  Recent Surgery: Procedure(s) (LRB):  CLOSURE, WOUND, STERNUM (N/A)  INSERTION, GRAFT, PERICARDIUM  DRAINAGE, PLEURAL EFFUSION 16 Days Post-Op    Recommendations:     Discharge Recommendations:  High Intensity Therapy (Simultaneous filing. User may not have seen previous data.)   Discharge Equipment Recommendations: to be determined by next level of care       Plan:     During this hospitalization, patient to be seen 6 x/week to address the listed problems via gait training, therapeutic activities, neuromuscular re-education, therapeutic exercises  Plan of Care Expires:  01/04/24  Plan of Care Reviewed with: patient (Simultaneous filing. User may not have seen previous data.)    Assessment:     Radha Abbott is a 63 y.o. male admitted with a medical diagnosis of LVAD (left ventricular assist device) present. Pt's primary limiting factor is decreased weight bearing on R LE due to new onset of R plantar foot pain. At this time, pt is unable to fully weight bearing through the pain. Medical team aware and addressing situation with initiation of gabapentin. Pt was able to stand x 3 trials today but unable to initiate gait due to inability to fully weight bear on R LE. Patient presents with good participation and motivation to return to prior level of function with High Intensity Therapy.  The Patient demonstrates appropriate endurance and strength to participate in up to 3 hours or 15hrs of combined therapy post acute.  Problem List: weakness, impaired endurance, impaired sensation, impaired functional mobility, gait instability, impaired balance, decreased upper extremity function, impaired cardiopulmonary response to activity, pain (Simultaneous filing. User may not have seen previous data.).  Rehab Prognosis: Good     GOALS:    Multidisciplinary Problems       Physical Therapy Goals          Problem: Physical Therapy    Goal Priority Disciplines Outcome Goal Variances Interventions   Physical Therapy Goal     PT, PT/OT Ongoing, Progressing     Description: Goals to be met by: 23     Patient will increase functional independence with mobility by performin. Sit to stand transfer with modified independence  2. Bed to chair transfer with supervision  3. Gait  x 150 feet with supervision using physician approved AD with standing erst breaks prn.   4. Lower extremity exercise program x30 reps per handout, with independence  5. Recite 3/3 sternal precautions and remain complaint with precautions throughout session with no verbal cues                     Multidisciplinary Problems (Resolved)          Problem: Physical Therapy    Goal Priority Disciplines Outcome Goal Variances Interventions   Physical Therapy Goal   (Resolved)     PT, PT/OT Met     Description: Goals to be met by: 23     Patient will increase functional independence with mobility by performin. Evaluate pt's need for physical therapy.                          Subjective   Communicated with RN prior to session.  Patient found HOB elevated upon PT entry to room, agreeable to evaluation. Radha Abbott's alone during session.    Chief Complaint: pain   Patient/Family Comments/goals: to get better  Pain/Comfort:  Pain Rating 1: 9/10 (Simultaneous filing. User may not have seen previous data.)  Location 1:  (plantar surface of R foot  Simultaneous filing. User may not have seen previous data.)  Pain Addressed 1: Reposition, Distraction (Simultaneous filing. User may not have seen previous data.)  Pain Rating Post-Intervention 1: 9/10    Objective:   Patient found with: telemetry, pulse ox (continuous), blood pressure cuff, LVAD, PICC line, central line, NG tube, arterial line   General Precautions: Standard, Cardiac fall, LVAD, sternal   Orthopedic  Precautions:N/A   Braces: N/A   Oxygen Device: Room Air  Vitals: BP (!) 82/0 (BP Location: Left arm, Patient Position: Lying)   Pulse 84   Temp 97.9 °F (36.6 °C) (Oral)   Resp (!) 23   Ht 6' (1.829 m)   Wt 82.1 kg (181 lb)   SpO2 99%   BMI 24.55 kg/m²     Outcome Measures:  AM-PAC 6 CLICK MOBILITY  Turning over in bed (including adjusting bedclothes, sheets and blankets)?: 2  Sitting down on and standing up from a chair with arms (e.g., wheelchair, bedside commode, etc.): 2  Moving from lying on back to sitting on the side of the bed?: 2  Moving to and from a bed to a chair (including a wheelchair)?: 2  Need to walk in hospital room?: 1  Climbing 3-5 steps with a railing?: 1  Basic Mobility Total Score: 10     Functional Mobility:  Additional staff present: OT - due to pt requiring 2 skilled therapists to safely perform functional mobility and to accommodate pt's activity tolerance    Bed Mobility:   Supine to Sit: maximal assistance; HOB elevated  Scooting anteriorly to EOB to have both feet planted on floor: maximal assistance  Sit to Supine: maximal assistance and 2 persons; HOB flat    Sitting Balance at Edge of Bed:  Assistance Level Required: CGA progressing to max A with fatigue   Time: 10 minutes  Comments:   Worked on activity tolerance sitting EOB, worked on tolerance to positional change, and worked on sitting balance   With fatigue pt demo'd decreased initiation of self correcting balance with an increase in posterior LOBs  Pt performed ADLs with OT     Transfers:   Sit <> Stand Transfer: maximal assistance and of 2 persons with no assistive device x 3 trials from raised EOB  Pt with feet in front of COG to off load weight bearing on R foot   Pt unable to tolerate stacking body on top of feet and therefore could not initiate steps today  Facilitation of hip and thoracic extension   Verbal cuing to bring feet back to midline and for upright posture     Gait:  Not performed 2nd to pt still working  on standing     Therapeutic Activities, Exercises, and Education:   Educated pt on PT role/POC  Educated pt on importance of OOB activity and daily ambulation   Educated pt on sternal precautions   Pt verbalized understanding     Therapeutic Exercise  Mobility for generalized strengthening  Sit to stand x 3 reps to increase B LE strength and increase standing tolerance  Initiation and use of core and postural muscles during sitting and standing trials        Patient left HOB elevated with all lines intact, call button in reach, and RN notified..    Time Tracking:     PT Received On: 12/20/23  PT Start Time: 0850     PT Stop Time: 0913  PT Total Time (min): 23 min       Billable Minutes:   Therapeutic Exercise 23    Treatment Type: Treatment  PT/PTA: PT

## 2023-12-20 NOTE — PLAN OF CARE
SICU PLAN OF CARE NOTE    Dx: LVAD (left ventricular assist device) present    Vital Signs: BP (!) 88/0 (BP Location: Left arm, Patient Position: Lying)   Pulse 101   Temp 97.5 °F (36.4 °C) (Oral)   Resp (!) 26   Ht 6' (1.829 m)   Wt 82.1 kg (181 lb)   SpO2 (!) 94%   BMI 24.55 kg/m²     SHIFT EVENTS:  VSS. CVP 10/9/8. Afebrile. Pressure WDL; midodrine decreased. SLED held overnight. Worked with OT/PT/Speech. Barium swallow study tomorrow. TF increased to 45cc/hr  tolerated well.     Neuro: AAO x4, Follows Commands, and Moves All Extremities    Respiratory: Room Air    Cardiac: NSR  LVAD HM3  Speed: 4900  Flow: 3.9-4.1  PI: 3-4  Pwr: 3.2-3.3    Diet: Tube Feeds @ 45cc/hr    Gtts: Heparin     Urine Output: Anuric     Labs/Accuchecks: q4h accuchecks; [place] [facility] .    SKIN NOTE:  Skin: See LDAs    Skin precautions maintained including:  Sacrum and heels with foam dressing in place for pressure protection. Frequent weight shift assistance provided Q2 hr and PRN (atleast once an hour) to prevent further breakdown. Bed plugged in and mattress inflated; continuous rotational turning bed feature / [employer] bed utilized. Adhesive use limited. Heels elevated off bed. Positioned off wounds. Pressure points protected and positioning supports utilized.  Skin-to-device areas padded. Skin-to-skin areas padded    POC reviewed with patient and family; questions and concerns addressed. See flow sheets for full assessment details.

## 2023-12-20 NOTE — ASSESSMENT & PLAN NOTE
-HeartMate 3 Implanted  12/1/2023  as DT  -HTS Primary  -Implanted by Dr. Washington  -Continue Coumadin, dosing by PharmD.  Goal INR 2.0-3.0 . Therapeutic today. Discontinue Heparin bridge   -Antiplatelets Not on ASA  -LDH is stable overall today. Will continue to monitor daily.  -Speed set at  4900   rpm, LSL 4500 rpm   -Interrogation notable for no events  -ECHO 12/11 AV does not open, IVS midline, LVEDD 6.1, with HM3 speed set to 4900 rpm  -Not listed for OHTx, declined for OHTX due to comorbidities         Procedure: Device Interrogation Including analysis of device parameters  Current Settings: Ventricular Assist Device  Review of device function is stable/unstable stable        12/20/2023    11:00 AM 12/20/2023    10:00 AM 12/20/2023     9:00 AM 12/20/2023     8:00 AM 12/20/2023     7:00 AM 12/20/2023     6:00 AM 12/20/2023     5:00 AM   TXP LVAD INTERROGATIONS   Type HeartMate3 HeartMate3 HeartMate3 HeartMate3 HeartMate3 HeartMate3    Flow 4.2 4.2 4.1 4 4 4.1 4.1   Speed 4900 4900 89317 4900 4900 4900 4900   PI 4 3.7 3.4 4.2 4.3 3.5 3.5   Power ([place]) 3.3 3.3 3.2 3.3 3.2 3.2 3.2   LSL 4500 4500 4500 4500 4500 4500 4500   Pulsatility Intermittent pulse Intermittent pulse Intermittent pulse Intermittent pulse Intermittent pulse Intermittent pulse Intermittent pulse

## 2023-12-20 NOTE — PLAN OF CARE
Heart Transplant Care Update Note:    Hemodynamics:  CVP: 9  SVO2: 56  CO: 6.89  CI: 3.38  SVR: 801    Wt: 82.1, BSA: 2.04, Hb:  7.2    I/O's (12h-shift): CRRT  - Total: 1.43L  - Net:-255    Plan:  No changes made overnight    Discussed with HTS Attending      Yudy Stockton MD  Cardiovascular Disease PGY IV  [facility]

## 2023-12-20 NOTE — ASSESSMENT & PLAN NOTE
-Psychiatry consulted for depressed mood, SI when left alone  -Recommend sitter when alone (possibly with transition to stepdown).  -Continue Mirtazapine  -Psychology also following  -Added venlafaxine 12/20

## 2023-12-20 NOTE — ASSESSMENT & PLAN NOTE
- suspect acute tubular injury secondary to hypotension/cardiogenic shock likely compounded by intra-arterial contrast and anemia with urinary sediment with granular casts  - iHD trial tonight, UF 1L as tolerated   - Recommend tunneled catheter   - renal diet/tube feeds when not NPO, volume restriction per primary team  - strict I/O's and daily weights  - renally all dose medications to eGFR  - avoid nephrotoxic agents wean feasible (i.e. NSAIDs, intra-arterial contrast, supra-therapeutic vancomycin levels, etc.)  - Will reeval RRT needs daily

## 2023-12-20 NOTE — PROGRESS NOTES
[facility] - Surgical Intensive Care  Heart Transplant  Progress Note    Patient Name: Radha Abbott  MRN: [id number]  Admission Date: 11/9/2023  Hospital Length of Stay: 41 days  Attending Physician: Juventino Bermudez Jr.*  Primary Care Provider: Vasu Kong MD  Principal Problem:LVAD (left ventricular assist device) present    Subjective:   Interval History: NAEON. Plan for MBSS today. Working with PT/OT. Mobility limited due to neuropathic pain to right foot, will add venlafaxine to gabapentin for additional pain relief. Remains constipated despite receiving suppository yesterday. Will check KUB. CVP 11, remains anuric. Nephrology following, will do HD trial today. IR consulted for tunneled trialysis line.     Continuous Infusions:   sodium chloride 0.9% Stopped (12/17/23 2300)    sodium chloride 0.9%      dextrose 10 % in water (D10W)      EPINEPHrine Stopped (12/13/23 2344)     Scheduled Meds:   acetaminophen  1,000 mg Per NG tube TID    amiodarone  400 mg Per NG tube Daily    balsam peru-castor oiL   Topical (Top) BID    gabapentin  125 mg Per NG tube Q12H    insulin aspart U-100  5 Units Subcutaneous 6 times per day    midodrine  10 mg Per NG tube TID    mirtazapine  15 mg Per NG tube Nightly    pantoprazole  40 mg Intravenous Daily    polyethylene glycol  17 g Per NG tube Daily    psyllium husk (aspartame)  1 packet Per NG tube Daily    senna-docusate 8.6-50 mg  2 tablet Per NG tube Daily    venlafaxine  37.5 mg Per G Tube Daily    warfarin  5 mg Per NG tube Daily     PRN Meds:albuterol sulfate, bisacodyL, calcium gluconate IVPB, calcium gluconate IVPB, calcium gluconate IVPB, dextrose 10 % in water (D10W), dextrose 10%, dextrose 10%, glucagon (human recombinant), insulin aspart U-100, magnesium hydroxide 400 mg/5 ml, melatonin, oxyCODONE, potassium chloride, potassium chloride, potassium chloride in water **AND** [DISCONTINUED] potassium chloride in water **AND** [DISCONTINUED] potassium chloride in  water, QUEtiapine, Flushing PICC/Midline Protocol **AND** [DISCONTINUED] sodium chloride 0.9% **AND** sodium chloride 0.9%    Review of patient's allergies indicates:  No Known Allergies  Objective:     Vital Signs (Most Recent):  Temp: 97.8 °F (36.6 °C) (12/20/23 0700)  Pulse: 98 (12/20/23 0924)  Resp: (!) 25 (12/20/23 0924)  BP: (!) 80/0 (12/20/23 0700)  SpO2: (!) 94 % (12/20/23 0300) Vital Signs (24h Range):  Temp:  [97.7 °F (36.5 °C)-98 °F (36.7 °C)] 97.8 °F (36.6 °C)  Pulse:  [] 98  Resp:  [21-40] 25  SpO2:  [94 %-97 %] 94 %  BP: (78-80)/(0) 80/0  Arterial Line BP: ()/() 83/54     Patient Vitals for the past 72 hrs (Last 3 readings):   Weight   12/19/23 0500 82.1 kg (181 lb)   12/18/23 0500 85.7 kg (189 lb)       Body mass index is 24.55 kg/m².      Intake/Output Summary (Last 24 hours) at 12/20/2023 1127  Last data filed at 12/20/2023 0800  Gross per 24 hour   Intake 1255.64 ml   Output 79 ml   Net 1176.64 ml         Hemodynamic Parameters:       Telemetry: ST       Physical Exam  Constitutional:       Comments: Cachectic, temporal wasting   HENT:      Head: Normocephalic and atraumatic.   Eyes:      Conjunctiva/sclera: Conjunctivae normal.      Pupils: Pupils are equal, round, and reactive to light.   Neck:      Comments: RIJ trialysis line. Do not appreciate elevated JVP  Cardiovascular:      Rate and Rhythm: Normal rate and regular rhythm.      Comments: Smooth VAD hum  Pulmonary:      Effort: Pulmonary effort is normal.      Breath sounds: Normal breath sounds.   Abdominal:      General: Bowel sounds are normal.      Palpations: Abdomen is soft.   Musculoskeletal:         General: No swelling. Normal range of motion.   Skin:     General: Skin is warm and dry.      Capillary Refill: Capillary refill takes 2 to 3 seconds.   Neurological:      General: No focal deficit present.      Mental Status: He is alert and oriented to person, place, and time.            Significant Labs:  CBC:  Recent  Labs   Lab 12/18/23 0417 12/19/23 0419 12/20/23  0258   WBC 18.26* 15.74* 15.22*   RBC 2.52* 2.37* 2.31*   HGB 7.6* 7.2* 7.1*   HCT 23.3* 22.0* 22.2*    423 430   MCV 93 93 96   MCH 30.2 30.4 30.7   MCHC 32.6 32.7 32.0       BNP:  Recent Labs   Lab 12/15/23  0311 12/18/23  0417 12/20/23  0258   BNP 1,312* 1,484* 1,130*       CMP:  Recent Labs   Lab 12/18/23 2252 12/19/23 0419 12/20/23 0258 12/20/23  0731    124*  --  179*   CALCIUM 8.2* 7.8*  --  8.1*   ALBUMIN 1.7* 1.7*  1.6* 1.7* 1.7*   PROT  --  5.9* 6.1 6.1    136  --  141   K 4.3 4.3  --  4.8   CO2 23 23  --  22*    108  --  108   BUN 5* 5*  --  31*   CREATININE 0.6 0.5  --  2.0*   ALKPHOS  --  219* 230* 219*   ALT  --  29 32 30   AST  --  76* 64* 61*   BILITOT  --  1.1* 0.9 0.8        Coagulation:   Recent Labs   Lab 12/18/23 0417 12/19/23 0419 12/20/23 0258   INR 1.9* 1.8* 2.0*   APTT 48.3* 46.2* 47.0*       LDH:  Recent Labs   Lab 12/18/23 0417 12/19/23 0419 12/20/23  0258   * 585* 541*       Microbiology:  Microbiology Results (last 7 days)       Procedure Component Value Units Date/Time    Fungus culture [[id number]] Collected: 12/06/23 1532    Order Status: Completed Specimen: Body Fluid from Lung, Right Updated: 12/20/23 0949     Fungus (Mycology) Culture Culture in progress      No fungus isolated after 2 weeks    Narrative:      Bronchial Wash    Blood culture [[id number]] Collected: 12/10/23 1240    Order Status: Completed Specimen: Blood from Peripheral, Antecubital, Right Updated: 12/15/23 2012     Blood Culture, Routine No growth after 5 days.    Blood culture [[id number]] Collected: 12/10/23 1149    Order Status: Completed Specimen: Blood from Line, Jugular, External Right Updated: 12/15/23 2012     Blood Culture, Routine No growth after 5 days.            I have reviewed all pertinent labs within the past 24 hours.    Estimated Creatinine Clearance: 42.6 mL/min (A) (based on SCr of 2 mg/dL  (H)).    Diagnostic Results:  I have reviewed and interpreted all pertinent imaging results/findings within the past 24 hours.  Assessment and Plan:     63 year old male with hx of CAD s/p 3v CABG (unclear anatomy, 2009), ICM with a recent EF of 15-20% s/p ICD (medtronik 2009), DM2 (a1c 7.7), HTN, HLD, Vfib on amio who presents to the ED with CC of SOB     Pt was recently admitted to [facility] as a transfer.  He was started on a Lasix gtt and did well.  Started on gDMT and discharged home on  5 with plans to follow up in [facility] clinic for ongoing transplant evaluation at another facility.  He says that about a few days ago he started to notice LE swelling.  This turned into Nelson and orthopnea.  He says he can't walk to the bathroom without getting SOB.  He also complains of weight gain.  He takes torsemide 20mg BID at home and was told to trial additional Lasix which he did without any improvement.  He was rx metolazone but has not been filled.  He came to the ED     In the ED he was AF with stable VS on RA.  CBC showing chronic anemia.  CMP notable for acute on chronic CKD with baseline around 2.1 and Cr now 2.6.  BNP elevated.  Lactate neg.  CXR showing pulmonary edema.  I evaluated the pt at the bedside.  Bedside TTE showing CVP >15.  He was subsequently admitted to the CCU for diuresis.     He was continued on his home  and was started on a lasix gtt, which he responded well to overnight (net -1700cc. He feels much better this morning as well. Our transplant coordinators have been working on insurance approval and he is now being transferred to [facility] service for transplant evaluation.     * LVAD (left ventricular assist device) present  -HeartMate 3 Implanted  12/1/2023  as DT  -[facility] Primary  -Implanted by Dr. Washington  -Continue Coumadin, dosing by PharmD.  Goal INR 2.0-3.0 . Therapeutic today. Discontinue Heparin bridge   -Antiplatelets Not on ASA  -LDH is stable overall today. Will continue to monitor daily.  -Speed  set at  4900   rpm, LSL 4500 rpm   -Interrogation notable for no events  -ECHO 12/11 AV does not open, IVS midline, LVEDD 6.1, with HM3 speed set to 4900 rpm  -Not listed for OHTx, declined for OHTX due to comorbidities         Procedure: Device Interrogation Including analysis of device parameters  Current Settings: Ventricular Assist Device  Review of device function is stable/unstable stable        12/20/2023    11:00 AM 12/20/2023    10:00 AM 12/20/2023     9:00 AM 12/20/2023     8:00 AM 12/20/2023     7:00 AM 12/20/2023     6:00 AM 12/20/2023     5:00 AM   TXP LVAD INTERROGATIONS   Type HeartMate3 HeartMate3 HeartMate3 HeartMate3 HeartMate3 HeartMate3    Flow 4.2 4.2 4.1 4 4 4.1 4.1   Speed 4900 4900 49244 4900 4900 4900 4900   PI 4 3.7 3.4 4.2 4.3 3.5 3.5   Power ([place]) 3.3 3.3 3.2 3.3 3.2 3.2 3.2   LSL 4500 4500 4500 4500 4500 4500 4500   Pulsatility Intermittent pulse Intermittent pulse Intermittent pulse Intermittent pulse Intermittent pulse Intermittent pulse Intermittent pulse         Right foot pain  -Neuropathic pain to plantar portion of right foot  -Continue gabapentin 125 mg BID   -Added venlafaxine     Cachexia  -Daily caloric intake with tube feeds increased to 2160 calories, 98 g protein    Unilateral complete vocal fold paralysis  -Appreciate ENT's help  -S/P augmentation of paralyzed left vocal cord 12/18    Dysphonia  -Speech following closely  -Appreciate ENT's help. S/P augmentation of paralyzed left vocal cord 12/18    Moderate malnutrition  -RD and SLP following  -Tolerating tube feeds. Total daily caloric intake increased to 2160  -Plan MBSS today     Leukocytosis  -WBC peaked at 33k, trending down at 15k. Afebrile  -Concern for septic shock. Was also on hydrocortisone with increasing pressor requirements, now discontinued   -ID consulted, started on empiric micafungin, meropenem, and vanc, since completed  -Cultures were all -ve  -Completed course for possible pneumonia, ID signed off      Adjustment disorder with anxiety  -Continue Remeron 15 mg q HS    Depressed mood  -Psychiatry consulted for depressed mood, SI when left alone  -Recommend sitter when alone (possibly with transition to stepdown).  -Continue Mirtazapine  -Psychology also following  -Added venlafaxine 12/20         IVÁN (acute kidney injury)  -Nephrology following      Altered mental status  -multifactorial  -likely metabolic encephalopathy +/- icu delirium   -CTH 12/7 negative for acute process  -Continue RRT for metabolic clearance as needed  -EEG done over the weekend no seizures detected, just generalized slowing  -provide frequent re-orientation  -Promote sleep/wake cycle   -Neurology consulted   - Much more awake, continue to encourage and motivate    Acute on chronic combined systolic and diastolic CHF, NYHA class 4  Pt with known ICM with an EF of 25% on home  presented with decompensated HF.      -lRHC 11/14 RA 14, PA 70/35, PCWP 32, CO 4.1, CI 2.1  -Repeat RHC 11/20 RA 20, PA 60/29 (39), PCWP 29, CO 4.6, CI 2.3  -ECHO 11/21 showed EF 15-20%, mild RV dysfunction, mild MR, LVED 6.9   -Home  5   -Home GDMT: Entreso 24/26 BID (on hold 2/2 IVÁN), hydralazine 25 q 8hr, all held prior to LVAD implant  -Current GDMT: Continue Hyd/Isordil, Farxiga. Will transition Valsartan to Entresto and add Aldactone  -Home diuretic: Lasix 40 mg BID   -IABP inserted 11/21 and underwent DT HM3 implant 12/1, chest closed 12/4  -LVAD speed increased to 4900 rpm on 12/7   -ECHO 12/11 AV does not open, IVS midline, LVEDD 6.1, with HM3 speed set to 4900 rpm  -Increased pressor requirements with Giapreza, Ketty, epi, and vaso 12/11. Able to wean off giapreza overnight 12/11. He has been weaned off all pressors and inotropes  -CVP 11, SVO2 48%, CO 6.5, CI 3.2,   -No significant hypotension. Decreased Midodrine to 10 mg tid      PAF (paroxysmal atrial fibrillation)  Known hx of pAF. In sinus rhythm on admission, but 1 run of AF RVR overnight.  He spontaneously converted.  - Continue home amiodarone 400mg qd  - Continue AC      Acute renal failure with acute tubular necrosis superimposed on stage 3b chronic kidney disease  IVÁN on CKD2. Baseline Cr of 1.7-2.0.   - Hold ARNi  -Trend BMPs daily   -Nephrology following   -SLED/SCUF for volume removal began on 12/1. He is anuric  -Did not respond to diuretic challenge with 240 mg IV Lasix, 1000 mg Diuril, and 500 mg IV Diamox done 12/14  -Plan for HD trial today   -IR consulted for tunneled trialysis line (current RIJ trialysis 9 days old)     Type 2 diabetes mellitus without complication, without long-term current use of insulin  -A1c 7.6, not insulin dependent  - Endocrine following, appreciate assistance with blood glucose management    CAD (coronary artery disease)  -S/p 3vCABG in 2009  -Lipitor on hold 2/2 mild transaminitis  -Not on ASA post VAD    Ventricular tachycardia  Hx VT s/p ICD placement (medtronic 2009)  - Continue home amio 400mg qd      Uninterrupted Critical Care/Counseling Time (not including procedures): 65 minutes      Vero Lozada PA-C  Heart Transplant  [facility] - [facility] Int[facility]

## 2023-12-20 NOTE — CONSULTS
Tunneled HD Catheter Placement Consult Note  Interventional Radiology    Consult Requested By: Vero Lozada  Reason for Consult: tunneled HD access    SUBJECTIVE:     Chief Complaint:  IVÁN on CKD, receiving dialysis since 12/5. Request for tunneled HD line.     History of Present Illness:  Radha Abbott is a 63 y.o. male with ICM (LVEF 10-15% & G3 DD), CAD (s/p 3 vessel CABG 2009), DM II, HTN, HLD, h/o v fib and CKD3 who was admitted on 11/9 for CHF exacerbation. Patient was initially managed with dobutamine and diuresis, however ultimately underwent intra-aortic balloon pump placement on 12/1. Nephrology started the patient on CRRT on 12/5 to assist with volume removal. Patient has been anuric since then, with IVÁN suspected to be secondary to ATN 2/2 hypotension/cardiogenic shock compounded by intra-arterial contrast and anemia.     Interventional Radiology has been consulted for tunneled HD catheter placement for  long term dialysis . WBC is 15 (infectious w/u negative, completed course of abx & leukocytosis though to be secondary to stress dose steroids), last set of blood cultures on 12/10 were no growth.     Review of Systems   Constitutional:  Negative for fever.        Generalized weakness secondary to prolonged hospitalization   Respiratory:  Negative for shortness of breath.    Cardiovascular:  Negative for chest pain.   Gastrointestinal:  Negative for nausea and vomiting.       Scheduled Meds:   acetaminophen  1,000 mg Per NG tube TID    amiodarone  400 mg Per NG tube Daily    balsam peru-castor oiL   Topical (Top) BID    gabapentin  125 mg Per NG tube Q12H    insulin aspart U-100  5 Units Subcutaneous 6 times per day    midodrine  10 mg Per NG tube TID    mirtazapine  15 mg Per NG tube Nightly    pantoprazole  40 mg Intravenous Daily    polyethylene glycol  17 g Per NG tube Daily    psyllium husk (aspartame)  1 packet Per NG tube Daily    senna-docusate 8.6-50 mg  2 tablet Per NG tube Daily    venlafaxine   37.5 mg Per G Tube Daily    warfarin  5 mg Per NG tube Daily     Continuous Infusions:   sodium chloride 0.9% Stopped (12/17/23 2300)    sodium chloride 0.9%      dextrose 10 % in water (D10W)      EPINEPHrine Stopped (12/13/23 2344)     PRN Meds:albuterol sulfate, bisacodyL, calcium gluconate IVPB, calcium gluconate IVPB, calcium gluconate IVPB, dextrose 10 % in water (D10W), dextrose 10%, dextrose 10%, glucagon (human recombinant), insulin aspart U-100, magnesium hydroxide 400 mg/5 ml, melatonin, oxyCODONE, potassium chloride, potassium chloride, potassium chloride in water **AND** [DISCONTINUED] potassium chloride in water **AND** [DISCONTINUED] potassium chloride in water, QUEtiapine, Flushing PICC/Midline Protocol **AND** [DISCONTINUED] sodium chloride 0.9% **AND** sodium chloride 0.9%    Review of patient's allergies indicates:  No Known Allergies    Past Medical History:   Diagnosis Date    CAD (coronary artery disease)     Diabetes mellitus     HFrEF (heart failure with reduced ejection fraction)     ICD (implantable cardioverter-defibrillator) in place     MI, old      Past Surgical History:   Procedure Laterality Date    ANGIOPLASTY-VENOUS ARTERY Right 12/1/2023    Procedure: ANGIOPLASTY-VENOUS ARTERY, RIGHT FEMORAL;  Surgeon: Yuri Washington MD;  Location: [address];  Service: Cardiovascular;  Laterality: Right;    AORTIC VALVULOPLASTY N/A 12/1/2023    Procedure: REPAIR, AORTIC VALVE;  Surgeon: Yuri Washington MD;  Location: [address];  Service: Cardiovascular;  Laterality: N/A;    CARDIAC SURGERY      CLOSURE N/A 12/1/2023    Procedure: CLOSURE, TEMPORARY;  Surgeon: Yuri Washington MD;  [facility];  Service: Cardiovascular;  Laterality: N/A;    DRAINAGE OF PLEURAL EFFUSION  12/4/2023    Procedure: DRAINAGE, PLEURAL EFFUSION;  Surgeon: Yuri Washington MD;  Location: [address];  Service: Cardiovascular;;    INSERTION OF GRAFT TO PERICARDIUM  12/4/2023    Procedure:  INSERTION, GRAFT, PERICARDIUM;  Surgeon: Yuri Washington MD;  Location: [facility] OR [facility];  Service: Cardiovascular;;    INSERTION OF INTRA-AORTIC BALLOON ASSIST DEVICE Right 11/21/2023    Procedure: INSERTION, INTRA-AORTIC BALLOON PUMP;  Surgeon: Finn Cohn MD;  Location: [facility] CATH LAB;  Service: Cardiology;  Laterality: Right;    LEFT VENTRICULAR ASSIST DEVICE Left 12/1/2023    Procedure: INSERTION-LEFT VENTRICULAR ASSIST DEVICE;  Surgeon: Yuri Washington MD;  Location: [facility] OR [facility];  Service: Cardiovascular;  Laterality: Left;  REDO STERNOTOMY - REDO SAW NEEDED FOR CASE    LYSIS OF ADHESIONS  12/1/2023    Procedure: LYSIS, ADHESIONS;  Surgeon: Yuri Washington MD;  [facility];  Service: Cardiovascular;;    PLACEMENT OF SWAN ROLANDO CATHETER WITH IMAGING GUIDANCE  11/20/2023    Procedure: INSERTION, CATHETER, SWAN-ROLANDO, WITH IMAGING GUIDANCE;  Surgeon: Sajan Hurley MD;  Location: [facility] CATH LAB;  Service: Cardiology;;    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 12/1/2023    Procedure: REPAIR, ANEURYSM, ARTERY, FEMORAL;  Surgeon: Yuri Washington MD;  [facility];  Service: Cardiovascular;  Laterality: Right;  Right Femoral Artery Repair    RIGHT HEART CATHETERIZATION Right 10/10/2023    Procedure: INSERTION, CATHETER, RIGHT HEART;  Surgeon: Bin Gandhi MD;  Location: [facility] CATH LAB;  Service: Cardiology;  Laterality: Right;    RIGHT HEART CATHETERIZATION Right 10/13/2023    Procedure: INSERTION, CATHETER, RIGHT HEART;  Surgeon: Walter Mcintyre MD;  Location: [facility] CATH LAB;  Service: Cardiology;  Laterality: Right;    RIGHT HEART CATHETERIZATION  11/13/2023    RIGHT HEART CATHETERIZATION Right 11/13/2023    Procedure: INSERTION, CATHETER, RIGHT HEART;  Surgeon: Juventino Bermudez Jr., MD;  Location: [facility] CATH LAB;  Service: Cardiology;  Laterality: Right;    RIGHT HEART CATHETERIZATION Right 11/20/2023    Procedure: INSERTION, CATHETER, RIGHT HEART;  Surgeon:  Sajan Hurley MD;  Location: [facility] CATH LAB;  Service: Cardiology;  Laterality: Right;    STERNAL WOUND CLOSURE N/A 12/4/2023    Procedure: CLOSURE, WOUND, STERNUM;  Surgeon: Yuri Washington MD;  [facility];  Service: Cardiovascular;  Laterality: N/A;    STERNOTOMY N/A 12/1/2023    Procedure: STERNOTOMY, REDO;  Surgeon: Yuri Washington MD;  [facility];  Service: Cardiovascular;  Laterality: N/A;    VALVULOPLASTY, MITRAL VALVE N/A 12/1/2023    Procedure: VALVULOPLASTY, MITRAL VALVE;  Surgeon: Yuri Washington MD;  [facility];  Service: Cardiovascular;  Laterality: N/A;     No family history on file.  Social History     Tobacco Use    Smoking status: Every Day     Current packs/day: 0.50     Types: Cigarettes   Substance Use Topics    Alcohol use: Yes     Comment: rarely    Drug use: No       OBJECTIVE:     Vital Signs (Most Recent)  Temp: 97.9 °F (36.6 °C) (12/20/23 1100)  Pulse: 90 (12/20/23 1300)  Resp: 13 (12/20/23 1300)  BP: (!) 82/0 (12/20/23 1100)  SpO2: (!) 94 % (12/20/23 0300)    Physical Exam:  Physical Exam  Vitals and nursing note reviewed.   Constitutional:       Comments: Sleeping on exam, but responds to voice and answers questions appropriately   HENT:      Head: Normocephalic.      Mouth/Throat:      Pharynx: Oropharynx is clear.   Cardiovascular:      Rate and Rhythm: Normal rate.      Comments: LVAD  Pulmonary:      Effort: Pulmonary effort is normal.   Skin:     General: Skin is dry.   Neurological:      General: No focal deficit present.         Laboratory  I have reviewed all pertinent lab results within the past 24 hours.    ASA/Mallampati  ASA: 3  Mallampati: 3    Imaging:  Recent imaging studies reviewed.     ASSESSMENT/PLAN:     Assessment:  63 y.o. male with a PMHx of  ICM (LVEF 10-15% & G3 DD), CAD (s/p 3 vessel CABG 2009), DM II, HTN, HLD, h/o v fib and CKD3 now s/p LVAD placement 12/1 and receiving RRT since 12/5 who has been referred  to IR for tunneled MD catheter placement for  long term HD access . The procedure was discussed in great detail with the patient and his wife including thorough explanations of the potential risks and benefits of tunneled HD catheter placement. Risks include bleeding at the puncture site, infection, catheter related thrombus, catheter dysfunction, and central vein stenosis. The patient is a candidate for tunneled HD catheter placement under MAC sedation. Plan discussed with ordering physician.The pt verbalized understanding of the plan and would like to proceed.    Will defer scheduling of tunneled HD catheter placement until patient is out of the ICU (data supports higher risk of infection in tunneled lines in ICU patients).     Please reach out to IR when patient is stepped out of the ICU and stable for line placement.       Adamaris Paige NP  Interventional Radiology

## 2023-12-20 NOTE — ASSESSMENT & PLAN NOTE
IVÁN on CKD2. Baseline Cr of 1.7-2.0.   - Hold ARNi  -Trend BMPs daily   -Nephrology following   -SLED/SCUF for volume removal began on 12/1. He is anuric  -Did not respond to diuretic challenge with 240 mg IV Lasix, 1000 mg Diuril, and 500 mg IV Diamox done 12/14  -Plan for HD trial today   -IR consulted for tunneled trialysis line (current RIJ trialysis 9 days old)

## 2023-12-20 NOTE — CARE UPDATE
-Glucose Goal 140-180    -A1C:   Hemoglobin A1C   Date Value Ref Range Status   10/12/2023 7.6 (H) 4.0 - 5.6 % Final     Comment:     ADA Screening Guidelines:  5.7-6.4%  Consistent with prediabetes  >or=6.5%  Consistent with diabetes    High levels of fetal hemoglobin interfere with the HbA1C  assay. Heterozygous hemoglobin variants (HbS, HgC, etc)do  not significantly interfere with this assay.   However, presence of multiple variants may affect accuracy.           -HOME REGIMEN: metformin     -GLUCOSE TREND FOR THE PAST 24HRS:   Recent Labs   Lab 12/19/23  0834 12/19/23  1230 12/19/23  1548 12/19/23  1923 12/19/23  2309 12/20/23  0257   POCTGLUCOSE 151* 97 182* 219* 165* 150*           -NO HYPOGYCEMIAS NOTED     - Diet  Diet NPO Except for: Medication (per NG tube)    -Steroids - none     -Tube Feeds - TF @ 45ml/hr         Plan:   - Novolog 5 units q 4 hr to cover TF- hold if TF held or BG < 100  - POCT Glucose every 4 hours  - Hypoglycemia protocol in place      ** Please notify Endocrine for any change and/or advance in diet**  ** Please call Endocrine for any BG related issues **     Discharge Planning:   TBD. Please notify endocrinology prior to discharge.

## 2023-12-20 NOTE — PROGRESS NOTES
[facility]  Nephrology  Progress Note    Patient Name: Radha Abbott  MRN: [id number]  Admission Date: 11/9/2023  Hospital Length of Stay: 41 days  Attending Provider: Juventino Bermudez Jr.*   Primary Care Physician: Vasu Kong MD  Principal Problem:LVAD (left ventricular assist device) present    Subjective:     Interval History: SLED held overnight. Remains anuric. Off pressors. CVP 11. Net +515. Oxygenating well on RA.     Review of patient's allergies indicates:  No Known Allergies  Current Facility-Administered Medications   Medication Frequency    0.9%  NaCl infusion Continuous    0.9%  NaCl infusion Continuous    acetaminophen tablet 1,000 mg TID    albuterol sulfate nebulizer solution 2.5 mg Q4H PRN    amiodarone tablet 400 mg Daily    balsam peru-castor oiL Oint BID    bisacodyL suppository 10 mg Daily PRN    calcium gluconate 1 g in NS IVPB (premixed) PRN    calcium gluconate 1 g in NS IVPB (premixed) PRN    calcium gluconate 1 g in NS IVPB (premixed) PRN    dextrose 10 % infusion Continuous PRN    dextrose 10% bolus 125 mL 125 mL PRN    dextrose 10% bolus 250 mL 250 mL PRN    EPINEPHrine 5 mg in dextrose 5% 250 mL infusion (premix) Continuous    gabapentin 250 mg/5 mL (5 mL) solution 125 mg Q12H    glucagon (human recombinant) injection 1 mg PRN    insulin aspart U-100 pen 0-10 Units Q4H PRN    insulin aspart U-100 pen 5 Units 6 times per day    magnesium hydroxide 400 mg/5 ml suspension 2,400 mg Daily PRN    melatonin tablet 6 mg Nightly PRN    midodrine tablet 10 mg TID    mirtazapine tablet 15 mg Nightly    oxyCODONE immediate release tablet 5 mg Q6H PRN    pantoprazole injection 40 mg Daily    polyethylene glycol packet 17 g Daily    potassium chloride 20 mEq in 100 mL IVPB (FOR CENTRAL LINE ADMINISTRATION ONLY) PRN    potassium chloride 20 mEq in 100 mL IVPB (FOR CENTRAL LINE ADMINISTRATION ONLY) PRN    potassium chloride 40 mEq in 100 mL IVPB (FOR CENTRAL LINE  ADMINISTRATION ONLY) PRN    psyllium husk (aspartame) 3.4 gram packet 1 packet Daily    QUEtiapine tablet 50 mg Q6H PRN    senna-docusate 8.6-50 mg per tablet 2 tablet Daily    sodium chloride 0.9% flush 10 mL PRN    venlafaxine tablet 37.5 mg Daily    warfarin (COUMADIN) tablet 5 mg Daily       Objective:     Vital Signs (Most Recent):  Temp: 97.9 °F (36.6 °C) (12/20/23 1100)  Pulse: 93 (12/20/23 1200)  Resp: (!) 23 (12/20/23 1200)  BP: (!) 82/0 (12/20/23 1100)  SpO2: (!) 94 % (12/20/23 0300) Vital Signs (24h Range):  Temp:  [97.7 °F (36.5 °C)-98 °F (36.7 °C)] 97.9 °F (36.6 °C)  Pulse:  [] 93  Resp:  [21-40] 23  SpO2:  [94 %-97 %] 94 %  BP: (78-82)/(0) 82/0  Arterial Line BP: ()/(53-88) 74/56     Weight: 82.1 kg (181 lb) (12/19/23 0500)  Body mass index is 24.55 kg/m².  Body surface area is 2.04 meters squared.    I/O last 3 completed shifts:  In: 5200.1 [I.V.:509.5; NG/GT:1605; IV Piggyback:3085.6]  Out: 4454 [Drains:20; Other:4434]     Physical Exam  Vitals and nursing note reviewed.   Eyes:      Conjunctiva/sclera: Conjunctivae normal.   Cardiovascular:      Comments: LVAD   Pulmonary:      Effort: Pulmonary effort is normal.      Comments: RA  Abdominal:      Palpations: Abdomen is soft.   Musculoskeletal:         General: No swelling.      Right lower leg: No edema.      Left lower leg: No edema.   Skin:     General: Skin is warm.   Neurological:      Mental Status: He is alert and oriented to person, place, and time.   Psychiatric:         Mood and Affect: Mood normal.          Significant Labs:  CBC:   Recent Labs   Lab 12/20/23  0258   WBC 15.22*   RBC 2.31*   HGB 7.1*   HCT 22.2*      MCV 96   MCH 30.7   MCHC 32.0     CMP:   Recent Labs   Lab 12/20/23  0731   *   CALCIUM 8.1*   ALBUMIN 1.7*   PROT 6.1      K 4.8   CO2 22*      BUN 31*   CREATININE 2.0*   ALKPHOS 219*   ALT 30   AST 61*   BILITOT 0.8     All labs within the past 24 hours have been reviewed.          Assessment/Plan:     Cardiac/Vascular  * LVAD (left ventricular assist device) present  -management per primary     Renal/  Acute renal failure with acute tubular necrosis superimposed on stage 3b chronic kidney disease  - suspect acute tubular injury secondary to hypotension/cardiogenic shock likely compounded by intra-arterial contrast and anemia with urinary sediment with granular casts  - iHD trial tonight, UF 1L as tolerated   - Recommend tunneled catheter   - renal diet/tube feeds when not NPO, volume restriction per primary team  - strict I/O's and daily weights  - renally all dose medications to eGFR  - avoid nephrotoxic agents wean feasible (i.e. NSAIDs, intra-arterial contrast, supra-therapeutic vancomycin levels, etc.)  - Will reeval RRT needs daily         Thank you for your consult. I will follow-up with patient. Please contact us if you have any additional questions.    Yolanda Cai DNP  Nephrology  [facility]

## 2023-12-20 NOTE — SUBJECTIVE & OBJECTIVE
Interval History: SLED held overnight. Remains anuric. Off pressors. CVP 11. Oxygenating well on RA.     Review of patient's allergies indicates:  No Known Allergies  Current Facility-Administered Medications   Medication Frequency    0.9%  NaCl infusion Continuous    0.9%  NaCl infusion Continuous    acetaminophen tablet 1,000 mg TID    albuterol sulfate nebulizer solution 2.5 mg Q4H PRN    amiodarone tablet 400 mg Daily    balsam peru-castor oiL Oint BID    bisacodyL suppository 10 mg Daily PRN    calcium gluconate 1 g in NS IVPB (premixed) PRN    calcium gluconate 1 g in NS IVPB (premixed) PRN    calcium gluconate 1 g in NS IVPB (premixed) PRN    dextrose 10 % infusion Continuous PRN    dextrose 10% bolus 125 mL 125 mL PRN    dextrose 10% bolus 250 mL 250 mL PRN    EPINEPHrine 5 mg in dextrose 5% 250 mL infusion (premix) Continuous    gabapentin 250 mg/5 mL (5 mL) solution 125 mg Q12H    glucagon (human recombinant) injection 1 mg PRN    insulin aspart U-100 pen 0-10 Units Q4H PRN    insulin aspart U-100 pen 5 Units 6 times per day    magnesium hydroxide 400 mg/5 ml suspension 2,400 mg Daily PRN    melatonin tablet 6 mg Nightly PRN    midodrine tablet 10 mg TID    mirtazapine tablet 15 mg Nightly    oxyCODONE immediate release tablet 5 mg Q6H PRN    pantoprazole injection 40 mg Daily    polyethylene glycol packet 17 g Daily    potassium chloride 20 mEq in 100 mL IVPB (FOR CENTRAL LINE ADMINISTRATION ONLY) PRN    potassium chloride 20 mEq in 100 mL IVPB (FOR CENTRAL LINE ADMINISTRATION ONLY) PRN    potassium chloride 40 mEq in 100 mL IVPB (FOR CENTRAL LINE ADMINISTRATION ONLY) PRN    psyllium husk (aspartame) 3.4 gram packet 1 packet Daily    QUEtiapine tablet 50 mg Q6H PRN    senna-docusate 8.6-50 mg per tablet 2 tablet Daily    sodium chloride 0.9% flush 10 mL PRN    venlafaxine tablet 37.5 mg Daily    warfarin (COUMADIN) tablet 5 mg Daily       Objective:     Vital Signs (Most Recent):  Temp: 97.9 °F (36.6 °C)  (12/20/23 1100)  Pulse: 93 (12/20/23 1200)  Resp: (!) 23 (12/20/23 1200)  BP: (!) 82/0 (12/20/23 1100)  SpO2: (!) 94 % (12/20/23 0300) Vital Signs (24h Range):  Temp:  [97.7 °F (36.5 °C)-98 °F (36.7 °C)] 97.9 °F (36.6 °C)  Pulse:  [] 93  Resp:  [21-40] 23  SpO2:  [94 %-97 %] 94 %  BP: (78-82)/(0) 82/0  Arterial Line BP: ()/(53-88) 74/56     Weight: 82.1 kg (181 lb) (12/19/23 0500)  Body mass index is 24.55 kg/m².  Body surface area is 2.04 meters squared.    I/O last 3 completed shifts:  In: 5200.1 [I.V.:509.5; NG/GT:1605; IV Piggyback:3085.6]  Out: 4454 [Drains:20; Other:4434]     Physical Exam  Vitals and nursing note reviewed.   Eyes:      Conjunctiva/sclera: Conjunctivae normal.   Cardiovascular:      Comments: LVAD   Pulmonary:      Effort: Pulmonary effort is normal.      Comments: RA  Abdominal:      Palpations: Abdomen is soft.   Musculoskeletal:         General: No swelling.      Right lower leg: No edema.      Left lower leg: No edema.   Skin:     General: Skin is warm.   Neurological:      Mental Status: He is alert and oriented to person, place, and time.   Psychiatric:         Mood and Affect: Mood normal.          Significant Labs:  CBC:   Recent Labs   Lab 12/20/23  0258   WBC 15.22*   RBC 2.31*   HGB 7.1*   HCT 22.2*      MCV 96   MCH 30.7   MCHC 32.0     CMP:   Recent Labs   Lab 12/20/23  0731   *   CALCIUM 8.1*   ALBUMIN 1.7*   PROT 6.1      K 4.8   CO2 22*      BUN 31*   CREATININE 2.0*   ALKPHOS 219*   ALT 30   AST 61*   BILITOT 0.8     All labs within the past 24 hours have been reviewed.

## 2023-12-20 NOTE — PROGRESS NOTES
[facility]  Adult Nutrition  Progress Note    SUMMARY       Recommendations      1.) Recommend continuing TF of Novasource Renal to goal rate of 45ml/hr to provide 2160 kcal, 98g PRO, and 774ml FF- FWF per MD (100% of EEN/EPN)- this is w/in the NIH guidelines for cachexia.                 - monitor for s/s of intolerance such as residuals >500ml, n/v/d, or abdominal distension.                   - consider adding BenePRO BID if CRRT continues (PRO needs will be higher), provides 12g PRO.                  - RD to consider increasing if pt tolerates.      2. Recommend to re-evaluate bowel regimen to help aid in constipation; add fiber is cases of diarrhea.       3. RD to monitor wt, tolerance, skin, labs.     Goals: Meet % of EEN/EPN by RD f/u date  Nutrition Goal Status: goal met  Communication of RD Recs:  (POC)    Assessment and Plan    Endocrine  Moderate malnutrition  Malnutrition Type:  Context: acute illness or injury  Level: moderate    Related to (etiology):   Inadequate nutrition intake    Signs and Symptoms (as evidenced by):   Nutritional intake <75% x 7days, observed orbital wasting and trace edema present.     Malnutrition Characteristic Summary:  Energy Intake (Malnutrition): less than 75% for greater than 7 days  Fluid Accumulation (Malnutrition):  (Trace edema)      Interventions/Recommendations (treatment strategy):  Collaboration of nutritional care with other providers.  EN    Nutrition Diagnosis Status:   Continues    Malnutrition Assessment  Malnutrition Context: acute illness or injury  Malnutrition Level: moderate          Energy Intake (Malnutrition): less than 75% for greater than 7 days  Fluid Accumulation (Malnutrition):  (Trace edema)   Orbital Region (Subcutaneous Fat Loss): mild depletion   [place] (Muscle Loss): moderate depletion     Reason for Assessment    Reason For Assessment: RD follow-up  Diagnosis: cardiac disease (CHF/ s/p LVAD on  12/1)  Relevant Medical History: CAD, DMT2, CHF, PAF  Interdisciplinary Rounds: did not attend  General Information Comments: RD f/u: pt resting comfortably, caregiver was in the room. As per NP note, pt is constipated. Psyllium husk has been discontinued. Pt remains on CRRT. RD notes continued wt shifts throughout hospital stay. Trace edema present. TF were increased to 45ml/hr, pt tolerating well, per RN staff. Noted that renal labs are worsening. Per caregiver, pt is still aspiration risk for PO foods. RD to continue monitor.  Nutrition Discharge Planning: Cardiac diet education provided 11/15- no additional questions for me at this time.    Nutrition Risk Screen    Nutrition Risk Screen: tube feeding or parenteral nutrition    Nutrition/Diet History    Spiritual, Cultural Beliefs, [employer] Practices, Values that Affect Care: no  Food Allergies: NKFA    Anthropometrics    Temp: 97.9 °F (36.6 °C)  Height Method: Stated  Height: 6' (182.9 cm)  Height (inches): 72 in  Weight Method: Bed Scale  Weight: 82.1 kg (181 lb)  Weight (lb): 181 lb  Ideal Body Weight (IBW), Male: 178 lb  % Ideal Body Weight, Male (lb): 96.63 %  BMI (Calculated): 24.5  BMI Grade: 18.5-24.9 - normal    Lab/Procedures/Meds    Pertinent Labs Reviewed: reviewed  Pertinent Labs Comments: BUN: 31, cr: 2.0, GFR: 37.3, gluc: 179, Ca: 8.1, ALP: 219,  alb: 1.7, CRP: 166.3    Pertinent Medications Reviewed: reviewed  Pertinent Medications Comments: Amiodarone, insulin, pantoprazole, abx, senna-docusate, NaCl, polyethylene glycol, coumadin, psyllium husk, epi, heparin    Estimated/Assessed Needs    Weight Used For Calorie Calculations: 81 kg (178 lb 9.2 oz) (IBW d/t edema present)  Energy Calorie Requirements (kcal): 2025- 2430 kcal    Energy Need Method: Kcal/kg (25-30 kcal/ kg of IBW)  Protein Requirements: 97- 122g (1.2-1.5g/kg of IBW)    Weight Used For Protein Calculations: 81 kg (178 lb 9.2 oz) (IBW d/t edema present.)  Fluid Requirements (mL): 1  ml/kcal or per MD     RDA Method (mL): 2025    Nutrition Prescription Ordered    Current Diet Order: NPO  Current Nutrition Support Formula Ordered: Novasource Renal  Current Nutrition Support Rate Ordered: 45 (ml)  Current Nutrition Support Frequency Ordered: ml/hr x 24hrs    Evaluation of Received Nutrient/Fluid Intake    Enteral Calories (kcal): 2160  Enteral Protein (gm): 98  Enteral (Free Water) Fluid (mL): 774  % Kcal Needs: 100%  % Protein Needs: 100%  I/O: +3.8L since admit  Energy Calories Required: meeting needs  Protein Required: meeting needs  Fluid Required:  (Per MD)  Total Fluid Intake (mL/kg): 1 ml/kcal or per MD  Comments: LBM 12/17  Tolerance: tolerating  % Intake of Estimated Energy Needs: 75 - 100 %  % Meal Intake: NPO    Nutrition Risk    Level of Risk/Frequency of Follow-up:  (RD to f/u x 1-2/week)     Monitor and Evaluation    Food and Nutrient Intake: energy intake, food and beverage intake, enteral nutrition intake, parenteral nutrition intake  Food and Nutrient Adminstration: diet order, enteral and parenteral nutrition administration  Knowledge/Beliefs/Attitudes: food and nutrition knowledge/skill, beliefs and attitudes  Physical Activity and Function: nutrition-related ADLs and IADLs, factors affecting access to physical activity  Anthropometric Measurements: height/length, weight, weight change, body mass index, growth pattern indices/percentile ranks  Biochemical Data, Medical Tests and Procedures: electrolyte and renal panel, gastrointestinal profile, glucose/endocrine profile, inflammatory profile, lipid profile  Nutrition-Focused Physical Findings: overall appearance, extremities, muscles and bones, head and eyes, skin     Nutrition Follow-Up    RD Follow-up?: Yes

## 2023-12-20 NOTE — PROGRESS NOTES
[facility]  Psychology  Consult Note      PROGRESS NOTE - INTERVENTION  Patient Name: Radha Abbott  MRN: [id number]  Date: 12/20/2023  Admission Date: 11/9/2023   Length of Stay: Hospital Day: 42   Attending Physician: Juventino Bermudez Jr.*        HISTORY OF PRESENTING ILLNESS:  64 yo male with LVAD for DT on 12/1, post op complicated by diffuse coagulopathy and RV dysfunction with subsequent chest closure on 12/4. Hospital course complicated by respiratory failure s/p intubation now extubated     BRIEF ASSESSMENT  Mood: Denied low mood, anhedonia, SI.   Anxiety: Some mild anxiety concerning his restrictive diet and eagerness to have feeding tube removed.   Pain: Denied pain or concerns with pain management.   Sleep: No difficulty with sleep onset, nighttime awakenings, early awakenings.   Appetite: NGT     INTERVENTION  Intervention Type: Motivational Interviewing  Session Content: Clinician engaged patient in motivational interviewing exercise. Clinician evoked from patient a description of the all the progress he has made since his surgery three weeks ago. Patient articulated that he could breath on his own as well as speak on his own. He is working towards being able to eat on his own as his next treatment benchmark. Clinician and patient's family also created a loose plan for celebrating the Christmas holiday in the hospital including putting up decorations in his room and having friends and family come and visit.     MENTAL STATUS EXAM & OBSERVATIONS  Appearance:  Good grooming and hygiene. Dressed in hospital gown. Appeared stated age.      Orientation: Oriented x 4.   Speech: Normal rate, low volume, and stop and stop prosody.    Thought Processes: Logical and goal-oriented.      Thought Content: Normal. No suicidal or homicidal ideation. No indications of obsessions or delusions.   Mood: Euthymic.   Affect: Flat.   Attention & Concentration: Intact. No deficits noted.   Insight: Good    Judgment: Good.   Behavioral Observations: Cooperative and engaged.Laying in bed with head elevated. Good eye contact. No signs of psychomotor agitation or retardation.     DIAGNOSTIC IMPRESSION & PLAN  Patient Active Problem List   Diagnosis    Ventricular tachycardia    CAD (coronary artery disease)    HFrEF (heart failure with reduced ejection fraction)    Type 2 diabetes mellitus without complication, without long-term current use of insulin    Hypokalemia    Acute renal failure with acute tubular necrosis superimposed on stage 3b chronic kidney disease    PAF (paroxysmal atrial fibrillation)    Ventricular fibrillation    Acute on chronic combined systolic and diastolic CHF, NYHA class 4    Defibrillator discharge    Receiving inotropic medication    Pre-transplant evaluation for heart transplant    Palliative care encounter    Advance care planning    LVAD (left ventricular assist device) present    At high risk for skin breakdown    Altered mental status    Abnormal CXR    Acute encephalopathy    IVÁN (acute kidney injury)    Heart failure    Septic shock    Depressed mood    Adjustment disorder with anxiety    Leukocytosis    Moderate malnutrition    Dysphonia    Unilateral complete vocal fold paralysis    Oliguria    Shortness of breath    Cachexia    Acute renal failure with tubular necrosis    Right foot pain    Acute renal failure on dialysis    Acute kidney injury superimposed on chronic kidney disease       Impression:  62 yo male with LVAD for DT on 12/1, post op complicated by diffuse coagulopathy and RV dysfunction with subsequent chest closure on 12/4. Hospital course complicated by respiratory failure s/p intubation now extubated. Psychiatry following. Psychology initially consulted to address suicidal ideation. Patient denied any current or past suicidal ideation. He clarified that a previous comment about wishing for death was an attempt to express frustration and phillips additional support from  wife. Patient is currently denying low mood but endorsed some anxiety and frustration over the continued need for a feeding tube. Clinician engaged patient in a review of all the treatment progress he has made and discussed plans to maintain good mood during the [place] holiday.     Plan: Psychology will continue to follow.     Recommendations  When appropriate, continue to draw patient's attention to his gains in functional abilities, strength, and overall treatment progress since his surgery by asking him or his wife Arlyn to reflect on the things he is capable of doing today that he wasn't capable of doing earlier in the week (e.g., sitting up in bed, sitting on edge of bed, transitioning to chair with support, engaging in conversations about his medical care, reduced pain etc).       Thank you for the opportunity to participate in this patient's care.      Length of Service: 14 minutes    Vasu Mejia   Psychology Fellow   Dept. of Psychiatry  [facility]

## 2023-12-20 NOTE — PT/OT/SLP PROGRESS
Occupational Therapy  Co Treatment    Name: Radha Abbott  MRN: [id number]  Admitting Diagnosis:  LVAD (left ventricular assist device) present  16 Days Post-Op    Recommendations:     Discharge Recommendations: High Intensity Therapy  Discharge Equipment Recommendations:   (TBD at next level of care)  Barriers to discharge:       Assessment:     Radha Abbott is a 63 y.o. male with a medical diagnosis of LVAD (left ventricular assist device) present. Pt with impaired mobility/ADl skills, impaired self care and mobility. Pt with flat affect and limited social engagement with therapy.     Rehab Prognosis:  Good; patient would benefit from acute skilled OT services to address these deficits and reach maximum level of function.       Plan:     Patient to be seen 6 x/week to address the above listed problems via self-care/home management, therapeutic exercises, neuromuscular re-education, therapeutic activities  Plan of Care Expires: 01/02/24  Plan of Care Reviewed with: patient    Subjective     Pt agreeable to therapy.     Pain/Comfort:  Pain Rating 1: 5/10  Location - Side 1: Right  Location 1: foot  Pain Addressed 1: Reposition, Distraction    Objective:     Communicated with: nsg prior to session.  Patient found in bed with tele, pulse ox, BP cuff, LVAD to wall power. PICC, central line, A-line, wound vac and NG.  Cotx completed this date to optimize functional performance and safety given impaired tolerance for activity in setting of ICU     General Precautions: Standard, fall, LVAD, sternal      Occupational Performance:     Bed Mobility:    Supine>sit with MAX A   Sit>supine with MAX A x 2  Functional Mobility/Transfers:  3 standing trials with MAX x 2 with 75% upright standing.     Activities of Daily Living:  GH; seated with MIN A to adequately complete basic face washing task.   LE dressing with TOTAL A   UE dressing; MAX A     AMPAC 6 Click ADL: 9    Treatment & Education:  Pt awake, alert and following commands.  Pt with flat affect and limited eye contact.   Pt recalled 2/3 sternal precautions with further education provided re: sternal precautions and implications on functional activity.   Pt verb 1/3 basic LVAD part identification. Further education provided with LVAD part identification. Pt able to demo technique for self test and then was able to scroll though numbers on controller for OT to document in LVAD binder.     Pt tolerated sitting EOB approx 10 min with MIN A to MAX A for postural control. Pt with posterior lean at times with no initiation noted to self correct.   Transport arriving to bring pt to [facility].     Education provided re: role of OT and safety with functional mobility/ADl skills.       Patient left HOB elevated with all lines intact, call button in reach, and transport and nsg  present    GOALS:   Multidisciplinary Problems       Occupational Therapy Goals          Problem: Occupational Therapy    Goal Priority Disciplines Outcome Interventions   Occupational Therapy Goal     OT, PT/OT Ongoing, Progressing    Description: Goals to be met by: 1/2/24     Patient will increase functional independence with ADLs by performing:    UB Dressing with SBA  LE Dressing with Supervision.  Grooming while standing at sink with Supervision.  Toileting from bedside commode with SBA for hygiene and clothing management.   Step transfer to bedside commode with SBA  [place] with VAD management during ADLs                             Time Tracking:     OT Date of Treatment: 12/20/23  OT Start Time: 0852  OT Stop Time: 0915  OT Total Time (min): 23 min    Billable Minutes:Therapeutic Activity 23    OT/PRIETO: OT          12/20/2023

## 2023-12-21 LAB
ABO + RH BLD: NORMAL
ALBUMIN SERPL BCP-MCNC: 1.7 G/DL (ref 3.5–5.2)
ALLENS TEST: ABNORMAL
ALLENS TEST: ABNORMAL
ALP SERPL-CCNC: 206 U/L (ref 55–135)
ALT SERPL W/O P-5'-P-CCNC: 29 U/L (ref 10–44)
ANION GAP SERPL CALC-SCNC: 11 MMOL/L (ref 8–16)
AST SERPL-CCNC: 56 U/L (ref 10–40)
BASOPHILS # BLD AUTO: 0.06 K/UL (ref 0–0.2)
BASOPHILS NFR BLD: 0.4 % (ref 0–1.9)
BILIRUB DIRECT SERPL-MCNC: 0.3 MG/DL (ref 0.1–0.3)
BILIRUB SERPL-MCNC: 0.6 MG/DL (ref 0.1–1)
BLD GP AB SCN CELLS X3 SERPL QL: NORMAL
BLD PROD TYP BPU: NORMAL
BLOOD UNIT EXPIRATION DATE: NORMAL
BLOOD UNIT TYPE CODE: 6200
BLOOD UNIT TYPE: NORMAL
BUN SERPL-MCNC: 51 MG/DL (ref 8–23)
CA-I BLDV-SCNC: 0.98 MMOL/L (ref 1.06–1.42)
CA-I BLDV-SCNC: 1.05 MMOL/L (ref 1.06–1.42)
CALCIUM SERPL-MCNC: 8.2 MG/DL (ref 8.7–10.5)
CHLORIDE SERPL-SCNC: 106 MMOL/L (ref 95–110)
CO2 SERPL-SCNC: 19 MMOL/L (ref 23–29)
CODING SYSTEM: NORMAL
CREAT SERPL-MCNC: 3.2 MG/DL (ref 0.5–1.4)
CROSSMATCH INTERPRETATION: NORMAL
DELSYS: ABNORMAL
DIFFERENTIAL METHOD BLD: ABNORMAL
DISPENSE STATUS: NORMAL
EOSINOPHIL # BLD AUTO: 0.1 K/UL (ref 0–0.5)
EOSINOPHIL NFR BLD: 0.7 % (ref 0–8)
ERYTHROCYTE [DISTWIDTH] IN BLOOD BY AUTOMATED COUNT: 18.4 % (ref 11.5–14.5)
EST. GFR  (NO RACE VARIABLE): 21.2 ML/MIN/1.73 M^2
GLUCOSE SERPL-MCNC: 177 MG/DL (ref 70–110)
HCO3 UR-SCNC: 27.8 MMOL/L (ref 24–28)
HCT VFR BLD AUTO: 20.7 % (ref 40–54)
HGB BLD-MCNC: 6.5 G/DL (ref 14–18)
IMM GRANULOCYTES # BLD AUTO: 0.1 K/UL (ref 0–0.04)
IMM GRANULOCYTES NFR BLD AUTO: 0.7 % (ref 0–0.5)
INR PPP: 2.4 (ref 0.8–1.2)
LDH SERPL L TO P-CCNC: 455 U/L (ref 110–260)
LYMPHOCYTES # BLD AUTO: 0.9 K/UL (ref 1–4.8)
LYMPHOCYTES NFR BLD: 6.4 % (ref 18–48)
MAGNESIUM SERPL-MCNC: 2 MG/DL (ref 1.6–2.6)
MCH RBC QN AUTO: 30 PG (ref 27–31)
MCHC RBC AUTO-ENTMCNC: 31.4 G/DL (ref 32–36)
MCV RBC AUTO: 95 FL (ref 82–98)
MODE: ABNORMAL
MONOCYTES # BLD AUTO: 0.7 K/UL (ref 0.3–1)
MONOCYTES NFR BLD: 5 % (ref 4–15)
NEUTROPHILS # BLD AUTO: 11.6 K/UL (ref 1.8–7.7)
NEUTROPHILS NFR BLD: 86.8 % (ref 38–73)
NRBC BLD-RTO: 0 /100 WBC
NUM UNITS TRANS PACKED RBC: NORMAL
PCO2 BLDA: 39.6 MMHG (ref 35–45)
PH SMN: 7.46 [PH] (ref 7.35–7.45)
PHOSPHATE SERPL-MCNC: 4.6 MG/DL (ref 2.7–4.5)
PLATELET # BLD AUTO: 407 K/UL (ref 150–450)
PMV BLD AUTO: 10.7 FL (ref 9.2–12.9)
PO2 BLDA: 29 MMHG (ref 40–60)
PO2 BLDA: 30 MMHG (ref 40–60)
POC BE: 4 MMOL/L
POC SATURATED O2: 55 % (ref 95–100)
POC SATURATED O2: 58 % (ref 95–100)
POC TCO2: 29 MMOL/L (ref 24–29)
POCT GLUCOSE: 120 MG/DL (ref 70–110)
POCT GLUCOSE: 135 MG/DL (ref 70–110)
POCT GLUCOSE: 151 MG/DL (ref 70–110)
POCT GLUCOSE: 176 MG/DL (ref 70–110)
POCT GLUCOSE: 178 MG/DL (ref 70–110)
POTASSIUM SERPL-SCNC: 4.8 MMOL/L (ref 3.5–5.1)
PROT SERPL-MCNC: 6 G/DL (ref 6–8.4)
PROTHROMBIN TIME: 24.5 SEC (ref 9–12.5)
RBC # BLD AUTO: 2.17 M/UL (ref 4.6–6.2)
SAMPLE: ABNORMAL
SAMPLE: ABNORMAL
SITE: ABNORMAL
SITE: ABNORMAL
SODIUM SERPL-SCNC: 136 MMOL/L (ref 136–145)
SPECIMEN OUTDATE: NORMAL
WBC # BLD AUTO: 13.34 K/UL (ref 3.9–12.7)

## 2023-12-21 PROCEDURE — 20600001 HC STEP DOWN PRIVATE ROOM

## 2023-12-21 PROCEDURE — 27000248 HC VAD-ADDITIONAL DAY

## 2023-12-21 PROCEDURE — 82330 ASSAY OF CALCIUM: CPT | Mod: 91 | Performed by: STUDENT IN AN ORGANIZED HEALTH CARE EDUCATION/TRAINING PROGRAM

## 2023-12-21 PROCEDURE — P9016 RBC LEUKOCYTES REDUCED: HCPCS | Performed by: PHYSICIAN ASSISTANT

## 2023-12-21 PROCEDURE — 99900035 HC TECH TIME PER 15 MIN (STAT)

## 2023-12-21 PROCEDURE — 83615 LACTATE (LD) (LDH) ENZYME: CPT | Performed by: STUDENT IN AN ORGANIZED HEALTH CARE EDUCATION/TRAINING PROGRAM

## 2023-12-21 PROCEDURE — 25000003 PHARM REV CODE 250

## 2023-12-21 PROCEDURE — 86920 COMPATIBILITY TEST SPIN: CPT | Performed by: PHYSICIAN ASSISTANT

## 2023-12-21 PROCEDURE — 80076 HEPATIC FUNCTION PANEL: CPT | Performed by: INTERNAL MEDICINE

## 2023-12-21 PROCEDURE — 25000003 PHARM REV CODE 250: Performed by: NURSE PRACTITIONER

## 2023-12-21 PROCEDURE — 63600175 PHARM REV CODE 636 W HCPCS

## 2023-12-21 PROCEDURE — 97535 SELF CARE MNGMENT TRAINING: CPT

## 2023-12-21 PROCEDURE — 97112 NEUROMUSCULAR REEDUCATION: CPT

## 2023-12-21 PROCEDURE — 84100 ASSAY OF PHOSPHORUS: CPT | Performed by: INTERNAL MEDICINE

## 2023-12-21 PROCEDURE — 83735 ASSAY OF MAGNESIUM: CPT | Performed by: INTERNAL MEDICINE

## 2023-12-21 PROCEDURE — 80048 BASIC METABOLIC PNL TOTAL CA: CPT | Performed by: STUDENT IN AN ORGANIZED HEALTH CARE EDUCATION/TRAINING PROGRAM

## 2023-12-21 PROCEDURE — 85610 PROTHROMBIN TIME: CPT | Performed by: INTERNAL MEDICINE

## 2023-12-21 PROCEDURE — 94761 N-INVAS EAR/PLS OXIMETRY MLT: CPT

## 2023-12-21 PROCEDURE — C9113 INJ PANTOPRAZOLE SODIUM, VIA: HCPCS | Performed by: PHYSICIAN ASSISTANT

## 2023-12-21 PROCEDURE — 99291 CRITICAL CARE FIRST HOUR: CPT | Mod: ,,, | Performed by: PHYSICIAN ASSISTANT

## 2023-12-21 PROCEDURE — 80100014 HC HEMODIALYSIS 1:1

## 2023-12-21 PROCEDURE — 86850 RBC ANTIBODY SCREEN: CPT | Performed by: PHYSICIAN ASSISTANT

## 2023-12-21 PROCEDURE — 25000242 PHARM REV CODE 250 ALT 637 W/ HCPCS: Performed by: INTERNAL MEDICINE

## 2023-12-21 PROCEDURE — 99232 SBSQ HOSP IP/OBS MODERATE 35: CPT | Mod: ,,, | Performed by: NURSE PRACTITIONER

## 2023-12-21 PROCEDURE — 97530 THERAPEUTIC ACTIVITIES: CPT

## 2023-12-21 PROCEDURE — 85025 COMPLETE CBC W/AUTO DIFF WBC: CPT | Performed by: INTERNAL MEDICINE

## 2023-12-21 PROCEDURE — 25000003 PHARM REV CODE 250: Performed by: INTERNAL MEDICINE

## 2023-12-21 PROCEDURE — 25000003 PHARM REV CODE 250: Performed by: PHYSICIAN ASSISTANT

## 2023-12-21 PROCEDURE — 94668 MNPJ CHEST WALL SBSQ: CPT

## 2023-12-21 PROCEDURE — 63600175 PHARM REV CODE 636 W HCPCS: Performed by: PHYSICIAN ASSISTANT

## 2023-12-21 RX ORDER — HYDROCODONE BITARTRATE AND ACETAMINOPHEN 500; 5 MG/1; MG/1
TABLET ORAL
Status: DISCONTINUED | OUTPATIENT
Start: 2023-12-21 | End: 2023-12-28

## 2023-12-21 RX ORDER — WARFARIN 4 MG/1
4 TABLET ORAL DAILY
Status: DISCONTINUED | OUTPATIENT
Start: 2023-12-21 | End: 2023-12-22

## 2023-12-21 RX ADMIN — INSULIN ASPART 2 UNITS: 100 INJECTION, SOLUTION INTRAVENOUS; SUBCUTANEOUS at 03:12

## 2023-12-21 RX ADMIN — Medication: at 09:12

## 2023-12-21 RX ADMIN — MIRTAZAPINE 15 MG: 15 TABLET, FILM COATED ORAL at 08:12

## 2023-12-21 RX ADMIN — INSULIN ASPART 5 UNITS: 100 INJECTION, SOLUTION INTRAVENOUS; SUBCUTANEOUS at 11:12

## 2023-12-21 RX ADMIN — ACETAMINOPHEN 1000 MG: 500 TABLET ORAL at 08:12

## 2023-12-21 RX ADMIN — MIDODRINE HYDROCHLORIDE 15 MG: 5 TABLET ORAL at 03:12

## 2023-12-21 RX ADMIN — MIDODRINE HYDROCHLORIDE 15 MG: 5 TABLET ORAL at 08:12

## 2023-12-21 RX ADMIN — SODIUM CHLORIDE: 9 INJECTION, SOLUTION INTRAVENOUS at 11:12

## 2023-12-21 RX ADMIN — INSULIN ASPART 5 UNITS: 100 INJECTION, SOLUTION INTRAVENOUS; SUBCUTANEOUS at 03:12

## 2023-12-21 RX ADMIN — PANTOPRAZOLE SODIUM 40 MG: 40 INJECTION, POWDER, FOR SOLUTION INTRAVENOUS at 08:12

## 2023-12-21 RX ADMIN — INSULIN ASPART 5 UNITS: 100 INJECTION, SOLUTION INTRAVENOUS; SUBCUTANEOUS at 08:12

## 2023-12-21 RX ADMIN — INSULIN ASPART 5 UNITS: 100 INJECTION, SOLUTION INTRAVENOUS; SUBCUTANEOUS at 12:12

## 2023-12-21 RX ADMIN — AMIODARONE HYDROCHLORIDE 400 MG: 200 TABLET ORAL at 08:12

## 2023-12-21 RX ADMIN — WARFARIN SODIUM 4 MG: 4 TABLET ORAL at 04:12

## 2023-12-21 RX ADMIN — GABAPENTIN 125 MG: 250 SOLUTION ORAL at 08:12

## 2023-12-21 RX ADMIN — INSULIN ASPART 2 UNITS: 100 INJECTION, SOLUTION INTRAVENOUS; SUBCUTANEOUS at 08:12

## 2023-12-21 RX ADMIN — VENLAFAXINE 37.5 MG: 37.5 TABLET ORAL at 09:12

## 2023-12-21 RX ADMIN — Medication: at 08:12

## 2023-12-21 RX ADMIN — PSYLLIUM HUSK 1 PACKET: 3.4 POWDER ORAL at 08:12

## 2023-12-21 RX ADMIN — INSULIN ASPART 5 UNITS: 100 INJECTION, SOLUTION INTRAVENOUS; SUBCUTANEOUS at 04:12

## 2023-12-21 NOTE — SUBJECTIVE & OBJECTIVE
Interval History: NAEON. No complaints. HD trial today. Hgb 6.5 this morning, will give 1 unit pRBC. Failed MBSS, will continue tube feeds. Transfer to stepdown unit.       Continuous Infusions:   sodium chloride 0.9% Stopped (12/17/23 2300)    sodium chloride 0.9%      dextrose 10 % in water (D10W)       Scheduled Meds:   sodium chloride 0.9%   Intravenous Once    acetaminophen  1,000 mg Per NG tube TID    amiodarone  400 mg Per NG tube Daily    balsam peru-castor oiL   Topical (Top) BID    gabapentin  125 mg Per NG tube Q12H    insulin aspart U-100  5 Units Subcutaneous 6 times per day    midodrine  15 mg Per NG tube TID    mirtazapine  15 mg Per NG tube Nightly    pantoprazole  40 mg Intravenous Daily    polyethylene glycol  17 g Per NG tube Daily    psyllium husk (aspartame)  1 packet Per NG tube Daily    senna-docusate 8.6-50 mg  2 tablet Per NG tube Daily    venlafaxine  37.5 mg Per G Tube Daily    warfarin  4 mg Per G Tube Daily     PRN Meds:albuterol sulfate, bisacodyL, calcium gluconate IVPB, calcium gluconate IVPB, calcium gluconate IVPB, dextrose 10 % in water (D10W), dextrose 10%, dextrose 10%, glucagon (human recombinant), insulin aspart U-100, melatonin, oxyCODONE, potassium chloride, potassium chloride, potassium chloride in water **AND** [DISCONTINUED] potassium chloride in water **AND** [DISCONTINUED] potassium chloride in water, QUEtiapine, Flushing PICC/Midline Protocol **AND** [DISCONTINUED] sodium chloride 0.9% **AND** sodium chloride 0.9%    Review of patient's allergies indicates:  No Known Allergies  Objective:     Vital Signs (Most Recent):  Temp: 97.6 °F (36.4 °C) (12/20/23 1900)  Pulse: 87 (12/21/23 1000)  Resp: 20 (12/21/23 1000)  BP: (!) 74/0 (12/21/23 0905)  SpO2: 97 % (12/21/23 0300) Vital Signs (24h Range):  Temp:  [97.5 °F (36.4 °C)-97.6 °F (36.4 °C)] 97.6 °F (36.4 °C)  Pulse:  [] 87  Resp:  [13-44] 20  SpO2:  [95 %-99 %] 97 %  BP: (74-86)/(0) 74/0  Arterial Line BP:  ()/(48-97) 82/64     Patient Vitals for the past 72 hrs (Last 3 readings):   Weight   12/19/23 0500 82.1 kg (181 lb)       Body mass index is 24.55 kg/m².      Intake/Output Summary (Last 24 hours) at 12/21/2023 1153  Last data filed at 12/21/2023 0900  Gross per 24 hour   Intake 1179.68 ml   Output 15 ml   Net 1164.68 ml         Hemodynamic Parameters:       Telemetry: ST       Physical Exam  Constitutional:       Comments: Cachectic, temporal wasting   HENT:      Head: Normocephalic and atraumatic.   Eyes:      Conjunctiva/sclera: Conjunctivae normal.      Pupils: Pupils are equal, round, and reactive to light.   Neck:      Comments: RIJ trialysis line. Do not appreciate elevated JVP  Cardiovascular:      Rate and Rhythm: Normal rate and regular rhythm.      Comments: Smooth VAD hum  Pulmonary:      Effort: Pulmonary effort is normal.      Breath sounds: Normal breath sounds.   Abdominal:      General: Bowel sounds are normal.      Palpations: Abdomen is soft.   Musculoskeletal:         General: No swelling. Normal range of motion.   Skin:     General: Skin is warm and dry.      Capillary Refill: Capillary refill takes 2 to 3 seconds.   Neurological:      General: No focal deficit present.      Mental Status: He is alert and oriented to person, place, and time.            Significant Labs:  CBC:  Recent Labs   Lab 12/19/23 0419 12/20/23 0258 12/20/23 2049 12/21/23  0308   WBC 15.74* 15.22*  --  13.34*   RBC 2.37* 2.31*  --  2.17*   HGB 7.2* 7.1*  --  6.5*   HCT 22.0* 22.2* 21* 20.7*    430  --  407   MCV 93 96  --  95   MCH 30.4 30.7  --  30.0   MCHC 32.7 32.0  --  31.4*       BNP:  Recent Labs   Lab 12/15/23  0311 12/18/23 0417 12/20/23  0258   BNP 1,312* 1,484* 1,130*       CMP:  Recent Labs   Lab 12/19/23 0419 12/20/23  0258 12/20/23  0731 12/21/23  0308   *  --  179* 177*   CALCIUM 7.8*  --  8.1* 8.2*   ALBUMIN 1.7*  1.6* 1.7* 1.7* 1.7*   PROT 5.9* 6.1 6.1 6.0     --  141 136    K 4.3  --  4.8 4.8   CO2 23  --  22* 19*     --  108 106   BUN 5*  --  31* 51*   CREATININE 0.5  --  2.0* 3.2*   ALKPHOS 219* 230* 219* 206*   ALT 29 32 30 29   AST 76* 64* 61* 56*   BILITOT 1.1* 0.9 0.8 0.6        Coagulation:   Recent Labs   Lab 12/18/23 0417 12/19/23 0419 12/20/23  0258 12/21/23  0352   INR 1.9* 1.8* 2.0* 2.4*   APTT 48.3* 46.2* 47.0*  --        LDH:  Recent Labs   Lab 12/19/23 0419 12/20/23  0258 12/21/23  0308   * 541* 455*       Microbiology:  Microbiology Results (last 7 days)       Procedure Component Value Units Date/Time    Fungus culture [[id number]] Collected: 12/06/23 1532    Order Status: Completed Specimen: Body Fluid from Lung, Right Updated: 12/20/23 0949     Fungus (Mycology) Culture Culture in progress      No fungus isolated after 2 weeks    Narrative:      Bronchial Wash    Blood culture [[id number]] Collected: 12/10/23 1240    Order Status: Completed Specimen: Blood from Peripheral, Antecubital, Right Updated: 12/15/23 2012     Blood Culture, Routine No growth after 5 days.    Blood culture [[id number]] Collected: 12/10/23 1149    Order Status: Completed Specimen: Blood from Line, Jugular, External Right Updated: 12/15/23 2012     Blood Culture, Routine No growth after 5 days.            I have reviewed all pertinent labs within the past 24 hours.    Estimated Creatinine Clearance: 26.6 mL/min (A) (based on SCr of 3.2 mg/dL (H)).    Diagnostic Results:  I have reviewed and interpreted all pertinent imaging results/findings within the past 24 hours.

## 2023-12-21 NOTE — ASSESSMENT & PLAN NOTE
- suspect acute tubular injury secondary to hypotension/cardiogenic shock likely compounded by intra-arterial contrast and anemia with urinary sediment with granular casts    - iHD trial today 12/21  - Plan for tunneled catheter once patient is stepped down from ICU   - renal diet/tube feeds when not NPO, volume restriction per primary team  - strict I/O's and daily weights  - renally all dose medications to eGFR  - avoid nephrotoxic agents wean feasible (i.e. NSAIDs, intra-arterial contrast, supra-therapeutic vancomycin levels, etc.)  - Will reeval RRT needs daily

## 2023-12-21 NOTE — PROGRESS NOTES
12/21/2023  Alondra Dykes    Current provider:  Juventino Bermudez Jr.*    Device interrogation:      12/21/2023    12:00 PM 12/21/2023    10:00 AM 12/21/2023     9:00 AM 12/21/2023     7:00 AM 12/21/2023     6:00 AM 12/21/2023     5:00 AM 12/21/2023     4:00 AM   TXP LVAD INTERROGATIONS   Type     HeartMate3 HeartMate3 HeartMate3   Flow 4.4 4 4 4.1 4 4.1 4.1   Speed 4900 4900 4900 4900 4900 4900 4900   PI 3.5 4.6 4.5 3.8 4.5 4.5 3.9   Power ([place]) 3.2 3.3 3.2 3.2 3.2 3.2 3.2   LSL 4500 4500 4500 4500 4500 4500 4500   Pulsatility Intermittent pulse Intermittent pulse Intermittent pulse Intermittent pulse Intermittent pulse Intermittent pulse Intermittent pulse          Rounded on [facility] to ensure all mechanical assist device settings (IABP or VAD) were appropriate and all parameters were within limits.  I was able to ensure all back up equipment was present, the staff had no issues, and the Perfusion Department daily rounding was complete.      For implantable VADs: Interrogation of Ventricular assist device was performed with analysis of device parameters and review of device function. I have personally reviewed the interrogation findings and agree with findings as stated.     In emergency, the nursing units have been notified to contact the perfusion department either by:  Calling [phone] from 630am to 4pm Mon thru Fri, utilizing the On-Call Finder functionality of Epic and searching for Perfusion, or by contacting the hospital  from 4pm to 630am and on weekends and asking to speak with the perfusionist on call.    1:19 PM

## 2023-12-21 NOTE — NURSING
Rounded on patient. Patient in bed, HD running and wife at bedside. VAD education continued. Patient has good recall but is getting terms confused. He does know the actions of the buttons and cables but cannot remember the specific names. We reviewed the controller basic functions including buttons, self test and power cables. New education included the emergency bag, batteries and clips.     Wife is working with patient on workbook and documenting in the binder. Patient is able to give wife the VAD numbers to document.

## 2023-12-21 NOTE — PROGRESS NOTES
[facility] - [facility]  Nephrology  Progress Note    Patient Name: Radha Abbott  MRN: [id number]  Admission Date: 11/9/2023  Hospital Length of Stay: 42 days  Attending Provider: Juventino Bermudez Jr.*   Primary Care Physician: Vaus Kong MD  Principal Problem:LVAD (left ventricular assist device) present    Subjective:     Interval History: Seen on HD, tolerating well. Hgb 6.5. Plan to transfuse 1 unit PRBC.      Review of patient's allergies indicates:  No Known Allergies  Current Facility-Administered Medications   Medication Frequency    0.9%  NaCl infusion (for blood administration) Q24H PRN    0.9%  NaCl infusion Continuous    0.9%  NaCl infusion Continuous    0.9%  NaCl infusion Once    acetaminophen tablet 1,000 mg TID    albuterol sulfate nebulizer solution 2.5 mg Q4H PRN    amiodarone tablet 400 mg Daily    balsam peru-castor oiL Oint BID    bisacodyL suppository 10 mg Daily PRN    calcium gluconate 1 g in NS IVPB (premixed) PRN    calcium gluconate 1 g in NS IVPB (premixed) PRN    calcium gluconate 1 g in NS IVPB (premixed) PRN    dextrose 10 % infusion Continuous PRN    dextrose 10% bolus 125 mL 125 mL PRN    dextrose 10% bolus 250 mL 250 mL PRN    gabapentin 250 mg/5 mL (5 mL) solution 125 mg Q12H    glucagon (human recombinant) injection 1 mg PRN    insulin aspart U-100 pen 0-10 Units Q4H PRN    insulin aspart U-100 pen 5 Units 6 times per day    melatonin tablet 6 mg Nightly PRN    midodrine tablet 15 mg TID    mirtazapine tablet 15 mg Nightly    oxyCODONE immediate release tablet 5 mg Q6H PRN    pantoprazole injection 40 mg Daily    polyethylene glycol packet 17 g Daily    potassium chloride 20 mEq in 100 mL IVPB (FOR CENTRAL LINE ADMINISTRATION ONLY) PRN    potassium chloride 20 mEq in 100 mL IVPB (FOR CENTRAL LINE ADMINISTRATION ONLY) PRN    potassium chloride 40 mEq in 100 mL IVPB (FOR CENTRAL LINE ADMINISTRATION ONLY) PRN    psyllium husk (aspartame) 3.4 gram packet 1  packet Daily    QUEtiapine tablet 50 mg Q6H PRN    senna-docusate 8.6-50 mg per tablet 2 tablet Daily    sodium chloride 0.9% flush 10 mL PRN    venlafaxine tablet 37.5 mg Daily    warfarin (COUMADIN) tablet 4 mg Daily       Objective:     Vital Signs (Most Recent):  Temp: 98 °F (36.7 °C) (12/21/23 1100)  Pulse: 86 (12/21/23 1230)  Resp: (!) 29 (12/21/23 1230)  BP: (!) 68/0 (12/21/23 1100)  SpO2: 97 % (12/21/23 0300) Vital Signs (24h Range):  Temp:  [97.5 °F (36.4 °C)-98 °F (36.7 °C)] 98 °F (36.7 °C)  Pulse:  [] 86  Resp:  [19-44] 29  SpO2:  [95 %-99 %] 97 %  BP: (68-86)/(0) 68/0  Arterial Line BP: ()/(50-97) 69/55     Weight: 82.1 kg (181 lb) (12/19/23 0500)  Body mass index is 24.55 kg/m².  Body surface area is 2.04 meters squared.    I/O last 3 completed shifts:  In: 1988.1 [I.V.:87.2; NG/GT:1705; IV Piggyback:195.9]  Out: 35 [Drains:35]     Physical Exam  Vitals and nursing note reviewed.   Eyes:      Conjunctiva/sclera: Conjunctivae normal.   Cardiovascular:      Comments: LVAD   Pulmonary:      Effort: Pulmonary effort is normal.      Comments: RA  Abdominal:      Palpations: Abdomen is soft.   Musculoskeletal:         General: No swelling.      Right lower leg: No edema.      Left lower leg: No edema.   Skin:     General: Skin is warm.   Neurological:      Mental Status: He is alert and oriented to person, place, and time.   Psychiatric:         Mood and Affect: Mood normal.          Significant Labs:  CBC:   Recent Labs   Lab 12/21/23  0308   WBC 13.34*   RBC 2.17*   HGB 6.5*   HCT 20.7*      MCV 95   MCH 30.0   MCHC 31.4*     CMP:   Recent Labs   Lab 12/21/23  0308   *   CALCIUM 8.2*   ALBUMIN 1.7*   PROT 6.0      K 4.8   CO2 19*      BUN 51*   CREATININE 3.2*   ALKPHOS 206*   ALT 29   AST 56*   BILITOT 0.6     All labs within the past 24 hours have been reviewed.         Assessment/Plan:     Cardiac/Vascular  * LVAD (left ventricular assist device) present  -management  per primary     Renal/  Acute renal failure with acute tubular necrosis superimposed on stage 3b chronic kidney disease  - suspect acute tubular injury secondary to hypotension/cardiogenic shock likely compounded by intra-arterial contrast and anemia with urinary sediment with granular casts    - iHD trial today 12/21  - Plan for tunneled catheter once patient is stepped down from ICU   - renal diet/tube feeds when not NPO, volume restriction per primary team  - strict I/O's and daily weights  - renally all dose medications to eGFR  - avoid nephrotoxic agents wean feasible (i.e. NSAIDs, intra-arterial contrast, supra-therapeutic vancomycin levels, etc.)  - Will reeval RRT needs daily         Thank you for your consult. I will follow-up with patient. Please contact us if you have any additional questions.    Yolanda Cai DNP  Nephrology  [facility] - [facility]

## 2023-12-21 NOTE — PROGRESS NOTES
12/21/23 1438   Trialysis (Dialysis) Catheter 12/10/23 1215 right internal jugular   Placement Date/Time: 12/10/23 1215   Location: right internal jugular  Insertion attempts (enter comment if more than 2 attempts): 1   Dressing Status Clean;Dry;Intact   Venous Patency/Care flushed w/o difficulty;normal saline locked   Arterial Patency/Care flushed w/o difficulty;normal saline locked   During Hemodialysis Assessment   Blood Flow Rate (mL/min) 400 mL/min   Dialysate Flow Rate (mL/min) 700 ml/min   Ultrafiltration Rate (mL/Hr) 630 mL/Hr   Arteriovenous Lines Secure Yes   Arterial Pressure (mmHg) -200 mmHg   Venous Pressure (mmHg) 180   UF Removed (mL) 1800 mL   TMP 40   Venous Line in Air Detector Yes   Intra-Hemodialysis Comments HD Completed   Post-Hemodialysis Assessment   Rinseback Volume (mL) 250 mL   Blood Volume Processed (Liters) 69.3 L   Dialyzer Clearance Moderately streaked   Duration of Treatment 190 minutes   Additional Fluid Intake (mL) 300 mL   Total UF (mL) 1800 mL   Net Fluid Removal 1000   Patient Response to Treatment Tolerated well     HD completed. Patient in room NAD.

## 2023-12-21 NOTE — PROGRESS NOTES
Interdisciplinary Rounds Report:   Attended interdisciplinary rounds with the [facility]/CTS services including the LVAD Coordinators, social workers, cardiologists, surgeons,  PT/OT/Speech, dietician, and unit charge nurses. Discussed patient status, plan of care, goals of care, including DC date, and post discharge needs. Plan of care will be discussed with the patient and/or family per the physician while rounding on the floor. This is a recurring meeting that is medically and socially necessary to collaborate with the interdisciplinary team to assist patient needs and safe discharge.

## 2023-12-21 NOTE — SUBJECTIVE & OBJECTIVE
Interval History: Seen on HD, tolerating well. Hgb 6.5. Plan to transfuse 1 unit PRBC.      Review of patient's allergies indicates:  No Known Allergies  Current Facility-Administered Medications   Medication Frequency    0.9%  NaCl infusion (for blood administration) Q24H PRN    0.9%  NaCl infusion Continuous    0.9%  NaCl infusion Continuous    0.9%  NaCl infusion Once    acetaminophen tablet 1,000 mg TID    albuterol sulfate nebulizer solution 2.5 mg Q4H PRN    amiodarone tablet 400 mg Daily    balsam peru-castor oiL Oint BID    bisacodyL suppository 10 mg Daily PRN    calcium gluconate 1 g in NS IVPB (premixed) PRN    calcium gluconate 1 g in NS IVPB (premixed) PRN    calcium gluconate 1 g in NS IVPB (premixed) PRN    dextrose 10 % infusion Continuous PRN    dextrose 10% bolus 125 mL 125 mL PRN    dextrose 10% bolus 250 mL 250 mL PRN    gabapentin 250 mg/5 mL (5 mL) solution 125 mg Q12H    glucagon (human recombinant) injection 1 mg PRN    insulin aspart U-100 pen 0-10 Units Q4H PRN    insulin aspart U-100 pen 5 Units 6 times per day    melatonin tablet 6 mg Nightly PRN    midodrine tablet 15 mg TID    mirtazapine tablet 15 mg Nightly    oxyCODONE immediate release tablet 5 mg Q6H PRN    pantoprazole injection 40 mg Daily    polyethylene glycol packet 17 g Daily    potassium chloride 20 mEq in 100 mL IVPB (FOR CENTRAL LINE ADMINISTRATION ONLY) PRN    potassium chloride 20 mEq in 100 mL IVPB (FOR CENTRAL LINE ADMINISTRATION ONLY) PRN    potassium chloride 40 mEq in 100 mL IVPB (FOR CENTRAL LINE ADMINISTRATION ONLY) PRN    psyllium husk (aspartame) 3.4 gram packet 1 packet Daily    QUEtiapine tablet 50 mg Q6H PRN    senna-docusate 8.6-50 mg per tablet 2 tablet Daily    sodium chloride 0.9% flush 10 mL PRN    venlafaxine tablet 37.5 mg Daily    warfarin (COUMADIN) tablet 4 mg Daily       Objective:     Vital Signs (Most Recent):  Temp: 98 °F (36.7 °C) (12/21/23 1100)  Pulse: 86 (12/21/23 1230)  Resp: (!) 29  (12/21/23 1230)  BP: (!) 68/0 (12/21/23 1100)  SpO2: 97 % (12/21/23 0300) Vital Signs (24h Range):  Temp:  [97.5 °F (36.4 °C)-98 °F (36.7 °C)] 98 °F (36.7 °C)  Pulse:  [] 86  Resp:  [19-44] 29  SpO2:  [95 %-99 %] 97 %  BP: (68-86)/(0) 68/0  Arterial Line BP: ()/(50-97) 69/55     Weight: 82.1 kg (181 lb) (12/19/23 0500)  Body mass index is 24.55 kg/m².  Body surface area is 2.04 meters squared.    I/O last 3 completed shifts:  In: 1988.1 [I.V.:87.2; NG/GT:1705; IV Piggyback:195.9]  Out: 35 [Drains:35]     Physical Exam  Vitals and nursing note reviewed.   Eyes:      Conjunctiva/sclera: Conjunctivae normal.   Cardiovascular:      Comments: LVAD   Pulmonary:      Effort: Pulmonary effort is normal.      Comments: RA  Abdominal:      Palpations: Abdomen is soft.   Musculoskeletal:         General: No swelling.      Right lower leg: No edema.      Left lower leg: No edema.   Skin:     General: Skin is warm.   Neurological:      Mental Status: He is alert and oriented to person, place, and time.   Psychiatric:         Mood and Affect: Mood normal.          Significant Labs:  CBC:   Recent Labs   Lab 12/21/23  0308   WBC 13.34*   RBC 2.17*   HGB 6.5*   HCT 20.7*      MCV 95   MCH 30.0   MCHC 31.4*     CMP:   Recent Labs   Lab 12/21/23  0308   *   CALCIUM 8.2*   ALBUMIN 1.7*   PROT 6.0      K 4.8   CO2 19*      BUN 51*   CREATININE 3.2*   ALKPHOS 206*   ALT 29   AST 56*   BILITOT 0.6     All labs within the past 24 hours have been reviewed.

## 2023-12-21 NOTE — CARE UPDATE
SICU PLAN OF CARE NOTE    Dx: LVAD (left ventricular assist device) present    Neuro: AAO x4, Follows Commands, and Moves All Extremities    Cardiac: NSR    Respiratory: Room Air    Urine Output: Anuric 0 cc/shift    Diet: NPO    VAD Speed 4900, flows 4-4.2 , power 3.2-3/3, PI 3.- 4.5     Labs/Accuchecks: Daily labs, accuchecks Q4.     Skin:  No new breakdown noted throughout shift. Patient turned Q2, mattress inflated, and bed working correctly. Pillows, wedge and foam padding in use. See flowsheet for further skin documentation.     See flowsheets for further documentation throughout shift. Plan is for pt to undergo HD trial today. WCTM.

## 2023-12-21 NOTE — PROGRESS NOTES
[facility] - Surgical Intensive Care  Heart Transplant  Progress Note    Patient Name: Radha Abbott  MRN: [id number]  Admission Date: 11/9/2023  Hospital Length of Stay: 42 days  Attending Physician: Juventino Bermudez Jr.*  Primary Care Provider: Vasu Kong MD  Principal Problem:LVAD (left ventricular assist device) present    Subjective:   Interval History: NAEON. No complaints. HD trial today. Hgb 6.5 this morning, will give 1 unit pRBC. Failed MBSS, will continue tube feeds. Transfer to stepdown unit.       Continuous Infusions:   sodium chloride 0.9% Stopped (12/17/23 2300)    sodium chloride 0.9%      dextrose 10 % in water (D10W)       Scheduled Meds:   sodium chloride 0.9%   Intravenous Once    acetaminophen  1,000 mg Per NG tube TID    amiodarone  400 mg Per NG tube Daily    balsam peru-castor oiL   Topical (Top) BID    gabapentin  125 mg Per NG tube Q12H    insulin aspart U-100  5 Units Subcutaneous 6 times per day    midodrine  15 mg Per NG tube TID    mirtazapine  15 mg Per NG tube Nightly    pantoprazole  40 mg Intravenous Daily    polyethylene glycol  17 g Per NG tube Daily    psyllium husk (aspartame)  1 packet Per NG tube Daily    senna-docusate 8.6-50 mg  2 tablet Per NG tube Daily    venlafaxine  37.5 mg Per G Tube Daily    warfarin  4 mg Per G Tube Daily     PRN Meds:albuterol sulfate, bisacodyL, calcium gluconate IVPB, calcium gluconate IVPB, calcium gluconate IVPB, dextrose 10 % in water (D10W), dextrose 10%, dextrose 10%, glucagon (human recombinant), insulin aspart U-100, melatonin, oxyCODONE, potassium chloride, potassium chloride, potassium chloride in water **AND** [DISCONTINUED] potassium chloride in water **AND** [DISCONTINUED] potassium chloride in water, QUEtiapine, Flushing PICC/Midline Protocol **AND** [DISCONTINUED] sodium chloride 0.9% **AND** sodium chloride 0.9%    Review of patient's allergies indicates:  No Known Allergies  Objective:     Vital Signs (Most  Recent):  Temp: 97.6 °F (36.4 °C) (12/20/23 1900)  Pulse: 87 (12/21/23 1000)  Resp: 20 (12/21/23 1000)  BP: (!) 74/0 (12/21/23 0905)  SpO2: 97 % (12/21/23 0300) Vital Signs (24h Range):  Temp:  [97.5 °F (36.4 °C)-97.6 °F (36.4 °C)] 97.6 °F (36.4 °C)  Pulse:  [] 87  Resp:  [13-44] 20  SpO2:  [95 %-99 %] 97 %  BP: (74-86)/(0) 74/0  Arterial Line BP: ()/(48-97) 82/64     Patient Vitals for the past 72 hrs (Last 3 readings):   Weight   12/19/23 0500 82.1 kg (181 lb)       Body mass index is 24.55 kg/m².      Intake/Output Summary (Last 24 hours) at 12/21/2023 1153  Last data filed at 12/21/2023 0900  Gross per 24 hour   Intake 1179.68 ml   Output 15 ml   Net 1164.68 ml         Hemodynamic Parameters:       Telemetry: ST       Physical Exam  Constitutional:       Comments: Cachectic, temporal wasting   HENT:      Head: Normocephalic and atraumatic.   Eyes:      Conjunctiva/sclera: Conjunctivae normal.      Pupils: Pupils are equal, round, and reactive to light.   Neck:      Comments: RIJ trialysis line. Do not appreciate elevated JVP  Cardiovascular:      Rate and Rhythm: Normal rate and regular rhythm.      Comments: Smooth VAD hum  Pulmonary:      Effort: Pulmonary effort is normal.      Breath sounds: Normal breath sounds.   Abdominal:      General: Bowel sounds are normal.      Palpations: Abdomen is soft.   Musculoskeletal:         General: No swelling. Normal range of motion.   Skin:     General: Skin is warm and dry.      Capillary Refill: Capillary refill takes 2 to 3 seconds.   Neurological:      General: No focal deficit present.      Mental Status: He is alert and oriented to person, place, and time.            Significant Labs:  CBC:  Recent Labs   Lab 12/19/23  0419 12/20/23  0258 12/20/23  2049 12/21/23  0308   WBC 15.74* 15.22*  --  13.34*   RBC 2.37* 2.31*  --  2.17*   HGB 7.2* 7.1*  --  6.5*   HCT 22.0* 22.2* 21* 20.7*    430  --  407   MCV 93 96  --  95   MCH 30.4 30.7  --  30.0    MCHC 32.7 32.0  --  31.4*       BNP:  Recent Labs   Lab 12/15/23  0311 12/18/23  0417 12/20/23  0258   BNP 1,312* 1,484* 1,130*       CMP:  Recent Labs   Lab 12/19/23  0419 12/20/23  0258 12/20/23  0731 12/21/23  0308   *  --  179* 177*   CALCIUM 7.8*  --  8.1* 8.2*   ALBUMIN 1.7*  1.6* 1.7* 1.7* 1.7*   PROT 5.9* 6.1 6.1 6.0     --  141 136   K 4.3  --  4.8 4.8   CO2 23  --  22* 19*     --  108 106   BUN 5*  --  31* 51*   CREATININE 0.5  --  2.0* 3.2*   ALKPHOS 219* 230* 219* 206*   ALT 29 32 30 29   AST 76* 64* 61* 56*   BILITOT 1.1* 0.9 0.8 0.6        Coagulation:   Recent Labs   Lab 12/18/23  0417 12/19/23  0419 12/20/23  0258 12/21/23  0352   INR 1.9* 1.8* 2.0* 2.4*   APTT 48.3* 46.2* 47.0*  --        LDH:  Recent Labs   Lab 12/19/23  0419 12/20/23  0258 12/21/23  0308   * 541* 455*       Microbiology:  Microbiology Results (last 7 days)       Procedure Component Value Units Date/Time    Fungus culture [[id number]] Collected: 12/06/23 1532    Order Status: Completed Specimen: Body Fluid from Lung, Right Updated: 12/20/23 0949     Fungus (Mycology) Culture Culture in progress      No fungus isolated after 2 weeks    Narrative:      Bronchial Wash    Blood culture [[id number]] Collected: 12/10/23 1240    Order Status: Completed Specimen: Blood from Peripheral, Antecubital, Right Updated: 12/15/23 2012     Blood Culture, Routine No growth after 5 days.    Blood culture [[id number]] Collected: 12/10/23 1149    Order Status: Completed Specimen: Blood from Line, Jugular, External Right Updated: 12/15/23 2012     Blood Culture, Routine No growth after 5 days.            I have reviewed all pertinent labs within the past 24 hours.    Estimated Creatinine Clearance: 26.6 mL/min (A) (based on SCr of 3.2 mg/dL (H)).    Diagnostic Results:  I have reviewed and interpreted all pertinent imaging results/findings within the past 24 hours.  Assessment and Plan:     63 year old male with hx of CAD  s/p 3v CABG (unclear anatomy, 2009), ICM with a recent EF of 15-20% s/p ICD (medtronik 2009), DM2 (a1c 7.7), HTN, HLD, Vfib on amio who presents to the ED with CC of SOB     Pt was recently admitted to [facility] as a transfer.  He was started on a Lasix gtt and did well.  Started on gDMT and discharged home on  5 with plans to follow up in [facility] clinic for ongoing transplant evaluation at another facility.  He says that about a few days ago he started to notice LE swelling.  This turned into Nelson and orthopnea.  He says he can't walk to the bathroom without getting SOB.  He also complains of weight gain.  He takes torsemide 20mg BID at home and was told to trial additional Lasix which he did without any improvement.  He was rx metolazone but has not been filled.  He came to the ED     In the ED he was AF with stable VS on RA.  CBC showing chronic anemia.  CMP notable for acute on chronic CKD with baseline around 2.1 and Cr now 2.6.  BNP elevated.  Lactate neg.  CXR showing pulmonary edema.  I evaluated the pt at the bedside.  Bedside TTE showing CVP >15.  He was subsequently admitted to the CCU for diuresis.     He was continued on his home  and was started on a lasix gtt, which he responded well to overnight (net -1700cc. He feels much better this morning as well. Our transplant coordinators have been working on insurance approval and he is now being transferred to [facility] service for transplant evaluation.     * LVAD (left ventricular assist device) present  -HeartMate 3 Implanted  12/1/2023  as DT  -[facility]  -Implanted by Dr. Washington  -Continue Coumadin, dosing by PharmD.  Goal INR 2.0-3.0 . Therapeutic today. Discontinue Heparin bridge   -Antiplatelets Not on ASA  -LDH is stable overall today. Will continue to monitor daily.  -Speed set at  4900   rpm, LSL 4500 rpm   -Interrogation notable for no events  -ECHO 12/11 AV does not open, IVS midline, LVEDD 6.1, with HM3 speed set to 4900 rpm  -Not listed for OHTx,  declined for OHTX due to comorbidities         Procedure: Device Interrogation Including analysis of device parameters  Current Settings: Ventricular Assist Device  Review of device function is stable/unstable stable        12/21/2023    12:00 PM 12/21/2023    10:00 AM 12/21/2023     9:00 AM 12/21/2023     7:00 AM 12/21/2023     6:00 AM 12/21/2023     5:00 AM 12/21/2023     4:00 AM   TXP LVAD INTERROGATIONS   Type     HeartMate3 HeartMate3 HeartMate3   Flow 4.4 4 4 4.1 4 4.1 4.1   Speed 4900 4900 4900 4900 4900 4900 4900   PI 3.5 4.6 4.5 3.8 4.5 4.5 3.9   Power ([place]) 3.2 3.3 3.2 3.2 3.2 3.2 3.2   LSL 4500 4500 4500 4500 4500 4500 4500   Pulsatility Intermittent pulse Intermittent pulse Intermittent pulse Intermittent pulse Intermittent pulse Intermittent pulse Intermittent pulse         Right foot pain  -Neuropathic pain to plantar portion of right foot  -Continue gabapentin 125 mg BID   -Added venlafaxine     Cachexia  -Daily caloric intake with tube feeds increased to 2160 calories, 98 g protein    Unilateral complete vocal fold paralysis  -Appreciate ENT's help  -S/P augmentation of paralyzed left vocal cord 12/18    Dysphonia  -Speech following closely  -Appreciate ENT's help. S/P augmentation of paralyzed left vocal cord 12/18    Moderate malnutrition  -RD and SLP following  -Tolerating tube feeds. Total daily caloric intake increased to 2160  -Failed MBBS 12/20, continue TF    Leukocytosis  -WBC peaked at 33k, trending down at 15k. Afebrile  -Concern for septic shock. Was also on hydrocortisone with increasing pressor requirements, now discontinued   -ID consulted, started on empiric micafungin, meropenem, and vanc, since completed  -Cultures were all -ve  -Completed course for possible pneumonia, ID signed off     Adjustment disorder with anxiety  -Continue Remeron 15 mg q HS    Depressed mood  -Psychiatry consulted for depressed mood, SI when left alone  -Recommend sitter when alone (possibly with  transition to stepdown).  -Continue Mirtazapine  -Psychology also following  -Added venlafaxine 12/20         IVÁN (acute kidney injury)  -Nephrology following      Altered mental status  -multifactorial  -likely metabolic encephalopathy +/- icu delirium   -CTH 12/7 negative for acute process  -Continue RRT for metabolic clearance as needed  -EEG done over the weekend no seizures detected, just generalized slowing  -provide frequent re-orientation  -Promote sleep/wake cycle   -Neurology consulted   - Much more awake, continue to encourage and motivate    Acute on chronic combined systolic and diastolic CHF, NYHA class 4  Pt with known ICM with an EF of 25% on home  presented with decompensated HF.      -lRHC 11/14 RA 14, PA 70/35, PCWP 32, CO 4.1, CI 2.1  -Repeat RHC 11/20 RA 20, PA 60/29 (39), PCWP 29, CO 4.6, CI 2.3  -ECHO 11/21 showed EF 15-20%, mild RV dysfunction, mild MR, LVED 6.9   -Home  5   -Home GDMT: Entreso 24/26 BID (on hold 2/2 IVÁN), hydralazine 25 q 8hr, all held prior to LVAD implant  -Current GDMT: Continue Hyd/Isordil, Farxiga. Will transition Valsartan to Entresto and add Aldactone  -Home diuretic: Lasix 40 mg BID   -IABP inserted 11/21 and underwent DT HM3 implant 12/1, chest closed 12/4  -LVAD speed increased to 4900 rpm on 12/7   -ECHO 12/11 AV does not open, IVS midline, LVEDD 6.1, with HM3 speed set to 4900 rpm  -Increased pressor requirements with Giapreza, Ketty, epi, and vaso 12/11. Able to wean off giapreza overnight 12/11. He has been weaned off all pressors and inotropes  -CVP 9, SVO2 55%, CO 7.8, CI 3.8,   -No significant hypotension. Remains on Midodrine 15 TID      PAF (paroxysmal atrial fibrillation)  Known hx of pAF. In sinus rhythm on admission, but 1 run of AF RVR overnight. He spontaneously converted.  - Continue home amiodarone 400mg qd  - Continue AC      Acute renal failure with acute tubular necrosis superimposed on stage 3b chronic kidney disease  IVÁN on CKD2.  Baseline Cr of 1.7-2.0.   - Hold ARNi  -Trend BMPs daily   -Nephrology following   -SLED/SCUF for volume removal began on 12/1. He is anuric  -Did not respond to diuretic challenge with 240 mg IV Lasix, 1000 mg Diuril, and 500 mg IV Diamox done 12/14  -Plan for HD trial today   -IR consulted for tunneled trialysis line (current RIJ trialysis 9 days old)     Type 2 diabetes mellitus without complication, without long-term current use of insulin  -A1c 7.6, not insulin dependent  - Endocrine following, appreciate assistance with blood glucose management    CAD (coronary artery disease)  -S/p 3vCABG in 2009  -Lipitor on hold 2/2 mild transaminitis  -Not on ASA post VAD    Ventricular tachycardia  Hx VT s/p ICD placement (medtronic 2009)  - Continue home amio 400mg qd      Uninterrupted Critical Care/Counseling Time (not including procedures): 65 minutes      Vero Lozada PA-C  Heart Transplant  [facility] - [facility]

## 2023-12-21 NOTE — CARE UPDATE
-Glucose Goal 140-180    -A1C:   Hemoglobin A1C   Date Value Ref Range Status   10/12/2023 7.6 (H) 4.0 - 5.6 % Final     Comment:     ADA Screening Guidelines:  5.7-6.4%  Consistent with prediabetes  >or=6.5%  Consistent with diabetes    High levels of fetal hemoglobin interfere with the HbA1C  assay. Heterozygous hemoglobin variants (HbS, HgC, etc)do  not significantly interfere with this assay.   However, presence of multiple variants may affect accuracy.           -HOME REGIMEN: metformin     -GLUCOSE TREND FOR THE PAST 24HRS:   Recent Labs   Lab 12/20/23  1146 12/20/23  1640 12/20/23  1917 12/20/23  2316 12/21/23  0306 12/21/23  0801   POCTGLUCOSE 137* 169* 175* 99 178* 120*         -NO HYPOGYCEMIAS NOTED     - Diet  Diet NPO Except for: Medication (per NG tube), patient failed swallow study and remains NPO on TF for now.     -Steroids - none     -Tube Feeds - TF @ 45ml/hr         Plan:   - Continue Novolog 5 units q 4 hr to cover TF- hold if TF held or BG < 100  - POCT Glucose every 4 hours  - Hypoglycemia protocol in place      ** Please notify Endocrine for any change and/or advance in diet**  ** Please call Endocrine for any BG related issues **     Discharge Planning:   TBD. Please notify endocrinology prior to discharge.

## 2023-12-21 NOTE — PLAN OF CARE
Heart Transplant Care Update Note:    Hemodynamics:  CVP: 9  SVO2: 50  CO: 6.13  CI: 3.00  SVR: 874    Wt: 82.1, BSA: 2.04, Hb: 7.1    I/O's (12h-shift):  - CRRT    Plan:  - No changes made overnight    Discussed with HTS Attending    Yudy Stockton MD  Cardiovascular Disease PGY IV  [facility]

## 2023-12-21 NOTE — ASSESSMENT & PLAN NOTE
-HeartMate 3 Implanted  12/1/2023  as DT  -[facility] Primary  -Implanted by Dr. Washington  -Continue Coumadin, dosing by PharmD.  Goal INR 2.0-3.0 . Therapeutic today. Discontinue Heparin bridge   -Antiplatelets Not on ASA  -LDH is stable overall today. Will continue to monitor daily.  -Speed set at  4900   rpm, LSL 4500 rpm   -Interrogation notable for no events  -ECHO 12/11 AV does not open, IVS midline, LVEDD 6.1, with HM3 speed set to 4900 rpm  -Not listed for OHTx, declined for OHTX due to comorbidities         Procedure: Device Interrogation Including analysis of device parameters  Current Settings: Ventricular Assist Device  Review of device function is stable/unstable stable        12/21/2023    12:00 PM 12/21/2023    10:00 AM 12/21/2023     9:00 AM 12/21/2023     7:00 AM 12/21/2023     6:00 AM 12/21/2023     5:00 AM 12/21/2023     4:00 AM   TXP LVAD INTERROGATIONS   Type     HeartMate3 HeartMate3 HeartMate3   Flow 4.4 4 4 4.1 4 4.1 4.1   Speed 4900 4900 4900 4900 4900 4900 4900   PI 3.5 4.6 4.5 3.8 4.5 4.5 3.9   Power ([place]) 3.2 3.3 3.2 3.2 3.2 3.2 3.2   LSL 4500 4500 4500 4500 4500 4500 4500   Pulsatility Intermittent pulse Intermittent pulse Intermittent pulse Intermittent pulse Intermittent pulse Intermittent pulse Intermittent pulse

## 2023-12-21 NOTE — ASSESSMENT & PLAN NOTE
-RD and SLP following  -Tolerating tube feeds. Total daily caloric intake increased to 2160  -Failed MBBS 12/20, continue TF

## 2023-12-21 NOTE — PROGRESS NOTES
Notified by HTS team that patient has been assigned a bed on CSU.     Will plan for tunneled HD line tomorrow, monitor with anesthesia due to presence of LVAD.    Please keep NPO after midnight.   No need to hold anticoagulation, however INR needs to be < 3.     Please see consult note dated 12/20      Adamaris Paige NP  Interventional Radiology

## 2023-12-21 NOTE — PT/OT/SLP PROGRESS
Speech Language Pathology      Radha Abbott  MRN: [id number]    Patient not seen today secondary to pt speaking with MD team upon arrival. Will follow-up at next service date as appropriate.

## 2023-12-21 NOTE — PT/OT/SLP PROGRESS
Physical Therapy Co-Treatment    Patient Name:  Radha Abbott   MRN:  [id number]  Admitting Diagnosis:  LVAD (left ventricular assist device) present   Recent Surgery: Procedure(s) (LRB):  CLOSURE, WOUND, STERNUM (N/A)  INSERTION, GRAFT, PERICARDIUM  DRAINAGE, PLEURAL EFFUSION 17 Days Post-Op  Admit Date: 11/9/2023  Length of Stay: 42 days    Recommendations:     Discharge Recommendations: High Intensity Therapy  Discharge Equipment Recommendations: to be determined by next level of care   Barriers to discharge: None    Appropriate transfer level with nursing staff: bed <> chair transfer with assist x 2    Plan:     During this hospitalization, patient to be seen 6 x/week to address the identified rehab impairments via gait training, therapeutic activities, therapeutic exercises, neuromuscular re-education and progress towards the established goals.  Plan of Care Expires:  01/04/24  Plan of Care Reviewed with: patient    Assessment     Radha Abbott is a 63 y.o. male admitted with a medical diagnosis of LVAD (left ventricular assist device) present. Pt tolerated session well today. He was able to demo good improvements in sitting balance and standing tolerance on this date pointing towards improving LE endurance as well as tolerance to activity. He presents more engaged and participatory in session as compared to previous sessions. He still is well below baseline and demo's continued limitations due to Lt foot pain and weakness in all extremities requiring substantial assist to complete standing. Pt will continue to benefit from skilled PT services during this hospital admit to continue to improve transfer ability and efficiency as well as continue to progress pt's ambulation distance and cardiopulmonary endurance to maximize pt's functional independence and return to PLOF. Patient has demonstrated sufficient progression to warrant high intensity therapy evidenced by objectives noted below.    Problem List: weakness,  impaired endurance, impaired self care skills, impaired functional mobility, gait instability, impaired balance, decreased upper extremity function, decreased lower extremity function, pain, decreased safety awareness, impaired coordination, impaired fine motor, impaired cardiopulmonary response to activity, impaired skin.  Rehab Prognosis: Good; patient would benefit from acute skilled PT services to address these deficits and reach maximum level of function.      Goals:   Multidisciplinary Problems       Physical Therapy Goals          Problem: Physical Therapy    Goal Priority Disciplines Outcome Goal Variances Interventions   Physical Therapy Goal     PT, PT/OT Ongoing, Progressing     Description: Goals to be met by: 23     Patient will increase functional independence with mobility by performin. Sit to stand transfer with modified independence  2. Bed to chair transfer with supervision  3. Gait  x 150 feet with supervision using physician approved AD with standing erst breaks prn.   4. Lower extremity exercise program x30 reps per handout, with independence  5. Recite 3/3 sternal precautions and remain complaint with precautions throughout session with no verbal cues                     Multidisciplinary Problems (Resolved)          Problem: Physical Therapy    Goal Priority Disciplines Outcome Goal Variances Interventions   Physical Therapy Goal   (Resolved)     PT, PT/OT Met     Description: Goals to be met by: 23     Patient will increase functional independence with mobility by performin. Evaluate pt's need for physical therapy.                          Subjective     RN notified prior to session. Wife present upon PT entrance into room. Patient agreeable to PT evaluation.    Chief Complaint: pt with no complaints  Patient/Family Comments/goals: go home  Pain/Comfort:  Pain Rating 1: 8/10  Location - Side 1: Right  Location - Orientation 1: generalized  Location 1: foot  Pain  Addressed 1: Reposition, Distraction  Pain Rating Post-Intervention 1: 8/10    Objective:     Additional staff present: OT; OT for cotx due to pt's multiple medical comorbidities and functional deficits req'ing two skilled therapists to appropriately progress pt's musculoskeletal strength, neuromuscular control, and endurance while taking into consideration severe medical acuity in the ICU    Patient found HOB elevated with: telemetry, blood pressure cuff, pulse ox (continuous), LVAD, PICC line, central line, NG tube, arterial line   Cognition:   Alert and Cooperative  Patient is oriented to Person, Place, Time, Situation  Command following: Follows two-step verbal commands  Fluency: clear/fluent  General Precautions: Standard, Cardiac aspiration, fall, LVAD, NPO, sternal   Orthopedic Precautions:N/A   Braces: N/A   Body mass index is 24.55 kg/m².  Oxygen Device: Room Air  Vitals: BP (!) 74/0 (BP Location: Left arm, Patient Position: Lying)   Pulse 87   Temp 97.6 °F (36.4 °C) (Oral)   Resp 20   Ht 6' (1.829 m)   Wt 82.1 kg (181 lb)   SpO2 97%   BMI 24.55 kg/m²     Outcome Measures:  AM-PAC 6 CLICK MOBILITY  Turning over in bed (including adjusting bedclothes, sheets and blankets)?: 2  Sitting down on and standing up from a chair with arms (e.g., wheelchair, bedside commode, etc.): 2  Moving from lying on back to sitting on the side of the bed?: 2  Moving to and from a bed to a chair (including a wheelchair)?: 2  Need to walk in hospital room?: 1  Climbing 3-5 steps with a railing?: 1  Basic Mobility Total Score: 10     Functional Mobility:    Bed Mobility:   Scooting to HOB via supine bridge: maximal assistance and 2 persons   Supine to Sit: maximal assistance and 2 persons for LE management and for trunk management; to Lt side of bed  Scooting anteriorly to EOB to have both feet planted on floor: maximal assistance   Sit to Supine: maximal assistance and 2 persons for LE management; to Lt side of  bed    Sitting Balance at Edge of Bed:  Static Sitting Balance: Fair : able to sit unsupported without balance loss and without UE support  Dynamic Sitting Balance: Fair : minimal weight shifting ipsilaterally or anteriorly. Difficulty crossing midline  Assistance Level Required: Contact Guard Assistance  Time: 10 minutes  Postural deviations noted: slouched posture, rounded shoulders, and forward head  Comments: No dizziness or LOB in sitting.    Transfers:   Sit <> Stand Transfer: maximal assistance and of 2 persons with hand-held assist. [id number] from EOB  Transfer to bedside chair deferred 2/2 dialysis being initiated after completion of session    Standing Balance:  Static Standing Balance: Poor-: requires maximal assistance and UE support to maintain standing balance without loss  Dynamic Standing Balance: Poor : able to stand with moderate assistance and minimally reach ipsilaterally. Unable to cross midline.  Assistance Level Required: Maximum Assistancex2  Patient used: hand-held assist   Time: 30 sec to 1 minute x 3 trials  Postural deviations noted: slouched posture, rounded shoulders, forward head, and poor midline awareness  Comments: Pt required max hip faciltiation for full extension, cueing at shoulder for upright posture, and knee blocking to maintain standing. Able to progress from static standing to pre gait weight shifts to x1 step in place (Rt stance, LLE swing) however pt with c/o fatigue and diffuse weakness in legs and would attempt to return self to sitting after completion of weight shifts of x1 step in place and required encouragement to remain standing.      Gait: Still progressing dynamic balance;     Education:  Time provided for education, counseling and discussion of health disposition in regards to patient's current status  All questions answered within PT scope of practice and to patient's satisfaction  PT role in POC to address current functional deficits  Pt educated on proper  body mechanics, safety techniques, and energy conservation with PT facilitation and cueing throughout session  Whiteboard updated with therapist name and pt's current mobility status documented above  Safe to perform squat pivot transfer to/from chair/bedside commode assist x 2 and HHA w/ nursing/PCT present    Patient left HOB elevated with all lines intact, call button in reach, RN notified, and wife present.    Time Tracking:     PT Received On: 12/21/23  PT Start Time: 0910     PT Stop Time: 0933  PT Total Time (min): 23 min     Billable Minutes:   Therapeutic Activity 8 minutes and Neuromuscular Re-education 15 minutes    Treatment Type: Treatment  PT/PTA: PT       12/21/2023

## 2023-12-21 NOTE — CONSULTS
Consult received.     Patient initially evaluated on 12/20 for tunneled HD catheter placement for long term dialysis needs.     Patient remains in SICU. Discussed with nursing staff. Due to current CSU bed availability, it is highly unlikely that patient will step out of the SICU today/this evening.     Will defer scheduling of tunneled HD catheter placement until patient is out of the ICU (data supports higher risk of infection in tunneled lines in ICU patients).     Due to pt's LVAD, he will need to be monitored under anesthesia during the case. IR typically does not perform non-emergent anesthesia cases over the weekend.     Given the upcoming holiday, anticipate that we will be able to place tunneled HD line on Tuesday of next week. Hopefully the patient will be out of the ICU at that time.     Please reach out to IR when patient is out of the ICU.   Please see consult note dated 12/20 for further information.       Adamaris Paige NP  Interventional Radiology

## 2023-12-21 NOTE — ASSESSMENT & PLAN NOTE
Pt with known ICM with an EF of 25% on home  presented with decompensated HF.      -lRHC 11/14 RA 14, PA 70/35, PCWP 32, CO 4.1, CI 2.1  -Repeat RHC 11/20 RA 20, PA 60/29 (39), PCWP 29, CO 4.6, CI 2.3  -ECHO 11/21 showed EF 15-20%, mild RV dysfunction, mild MR, LVED 6.9   -Home  5   -Home GDMT: Entreso 24/26 BID (on hold 2/2 IVÁN), hydralazine 25 q 8hr, all held prior to LVAD implant  -Current GDMT: Continue Hyd/Isordil, Farxiga. Will transition Valsartan to Entresto and add Aldactone  -Home diuretic: Lasix 40 mg BID   -IABP inserted 11/21 and underwent DT HM3 implant 12/1, chest closed 12/4  -LVAD speed increased to 4900 rpm on 12/7   -ECHO 12/11 AV does not open, IVS midline, LVEDD 6.1, with HM3 speed set to 4900 rpm  -Increased pressor requirements with Giapreza, Ketty, epi, and vaso 12/11. Able to wean off giapreza overnight 12/11. He has been weaned off all pressors and inotropes  -CVP 9, SVO2 55%, CO 7.8, CI 3.8,   -No significant hypotension. Remains on Midodrine 15 TID

## 2023-12-21 NOTE — PT/OT/SLP PROGRESS
Occupational Therapy   Treatment    Name: Radha Abbott  MRN: [id number]  Admitting Diagnosis:  LVAD (left ventricular assist device) present  17 Days Post-Op    Recommendations:     Discharge Recommendations: High Intensity Therapy  Discharge Equipment Recommendations:   (TBD at next level of care)  Barriers to discharge:  None    Assessment:     Radha Abbott is a 63 y.o. male with a medical diagnosis of LVAD (left ventricular assist device) present.   He presents with continued progress towards goals with pt achieving full upright posture 2/3 stands, though still requires significant assistance. Pt also able to sit unsupported during grooming tasks. Performance deficits affecting function are weakness, impaired self care skills, impaired balance, decreased coordination, decreased safety awareness, impaired endurance, impaired functional mobility, decreased upper extremity function, decreased lower extremity function, impaired cardiopulmonary response to activity, impaired fine motor, pain, gait instability.     Rehab Prognosis:  Good; patient would benefit from acute skilled OT services to address these deficits and reach maximum level of function.       Plan:     Patient to be seen 6 x/week to address the above listed problems via self-care/home management, therapeutic activities, therapeutic exercises, neuromuscular re-education  Plan of Care Expires: 01/02/24  Plan of Care Reviewed with: patient, spouse    Subjective     Chief Complaint: denies  Patient/Family Comments/goals: to go home  Pain/Comfort:  Pain Rating 1: 8/10  Location - Side 1: Right  Location - Orientation 1: generalized  Location 1: foot  Pain Addressed 1: Pre-medicate for activity, Reposition, Distraction  Pain Rating Post-Intervention 1: 8/10    Objective:     Communicated with: RN prior to session.  Patient found supine with blood pressure cuff, pulse ox (continuous), telemetry, NG tube, LVAD, PICC line, central line, arterial line, wound vac upon  OT entry to room.    General Precautions: Standard, LVAD, fall, sternal, aspiration, NPO    Orthopedic Precautions:N/A  Braces: N/A  Respiratory Status: Room air     Occupational Performance:     Bed Mobility:    Patient completed Scooting/Bridging with maximal assistance  Patient completed Supine to Sit with maximal assistance  Patient completed Sit to Supine with maximal assistance     Functional Mobility/Transfers:  Patient completed Sit <> Stand Transfer with maximal assistance and of 2 persons  with  hand-held assist x 3 trials maintaining upright posture with max A x 2 for 30-60 seconds  Functional Mobility: NT    Activities of Daily Living:  Grooming: stand by assistance for tasks seated EOB  Upper Body Dressing: moderate assistance    Lower Body Dressing: moderate assistance pulling up socks      AMPAC 6 Click ADL: 10    Treatment & Education:  Pt ed on OT POC  Pt ed on importance of activity participation and progression  OOBTC deferred as pt awaiting HD    Patient left with bed in chair position with all lines intact, call button in reach, RN\ notified, and spouse present    GOALS:   Multidisciplinary Problems       Occupational Therapy Goals          Problem: Occupational Therapy    Goal Priority Disciplines Outcome Interventions   Occupational Therapy Goal     OT, PT/OT Ongoing, Progressing    Description: Goals to be met by: 1/2/24     Patient will increase functional independence with ADLs by performing:    UB Dressing with SBA  LE Dressing with Supervision.  Grooming while standing at sink with Supervision.  Toileting from bedside commode with SBA for hygiene and clothing management.   Step transfer to bedside commode with SBA  [place] with VAD management during ADLs                             Time Tracking:     OT Date of Treatment: 12/21/23  OT Start Time: 0909  OT Stop Time: 0934  OT Total Time (min): 25 min    Billable Minutes:Self Care/Home Management 15  Therapeutic Activity 10                12/21/2023

## 2023-12-21 NOTE — PROGRESS NOTES
Update:  SW met with pt's wife to f/up. SW provided wife with meal cards to assist due to lengthy hospital stay.  Pt's wife very appreciative.  Pt's wife informed that SW will be out through next week but coworkers will be covering if any needs arise.  Pt's wife voiced understanding and agreement.  No additional needs identified at this time.  Pt's wife advised she is coping well at this time.

## 2023-12-22 ENCOUNTER — ANESTHESIA EVENT (OUTPATIENT)
Dept: INTERVENTIONAL RADIOLOGY/VASCULAR | Facility: HOSPITAL | Age: 63
DRG: 001 | End: 2023-12-22
Payer: MEDICAID

## 2023-12-22 LAB
ALBUMIN SERPL BCP-MCNC: 1.8 G/DL (ref 3.5–5.2)
ALBUMIN SERPL BCP-MCNC: 1.8 G/DL (ref 3.5–5.2)
ALLENS TEST: ABNORMAL
ALP SERPL-CCNC: 215 U/L (ref 55–135)
ALP SERPL-CCNC: 215 U/L (ref 55–135)
ALT SERPL W/O P-5'-P-CCNC: 28 U/L (ref 10–44)
ALT SERPL W/O P-5'-P-CCNC: 28 U/L (ref 10–44)
ANION GAP SERPL CALC-SCNC: 14 MMOL/L (ref 8–16)
AST SERPL-CCNC: 50 U/L (ref 10–40)
AST SERPL-CCNC: 50 U/L (ref 10–40)
BASOPHILS # BLD AUTO: 0.08 K/UL (ref 0–0.2)
BASOPHILS NFR BLD: 0.6 % (ref 0–1.9)
BILIRUB DIRECT SERPL-MCNC: 0.3 MG/DL (ref 0.1–0.3)
BILIRUB SERPL-MCNC: 0.7 MG/DL (ref 0.1–1)
BILIRUB SERPL-MCNC: 0.7 MG/DL (ref 0.1–1)
BNP SERPL-MCNC: 1628 PG/ML (ref 0–99)
BUN SERPL-MCNC: 35 MG/DL (ref 8–23)
CA-I BLDV-SCNC: 1.03 MMOL/L (ref 1.06–1.42)
CA-I BLDV-SCNC: 1.08 MMOL/L (ref 1.06–1.42)
CA-I BLDV-SCNC: 1.09 MMOL/L (ref 1.06–1.42)
CALCIUM SERPL-MCNC: 8 MG/DL (ref 8.7–10.5)
CHLORIDE SERPL-SCNC: 100 MMOL/L (ref 95–110)
CO2 SERPL-SCNC: 26 MMOL/L (ref 23–29)
CREAT SERPL-MCNC: 2.6 MG/DL (ref 0.5–1.4)
CRP SERPL-MCNC: 121 MG/L (ref 0–8.2)
DELSYS: ABNORMAL
DIFFERENTIAL METHOD BLD: ABNORMAL
EOSINOPHIL # BLD AUTO: 0.1 K/UL (ref 0–0.5)
EOSINOPHIL NFR BLD: 0.6 % (ref 0–8)
ERYTHROCYTE [DISTWIDTH] IN BLOOD BY AUTOMATED COUNT: 21.2 % (ref 11.5–14.5)
EST. GFR  (NO RACE VARIABLE): 27.2 ML/MIN/1.73 M^2
FIO2: 21
GLUCOSE SERPL-MCNC: 86 MG/DL (ref 70–110)
HAV IGM SERPL QL IA: NORMAL
HBV CORE AB SERPL QL IA: NORMAL
HBV SURFACE AB SER-ACNC: 884.51 MIU/ML
HBV SURFACE AB SER-ACNC: REACTIVE M[IU]/ML
HBV SURFACE AG SERPL QL IA: NORMAL
HCO3 UR-SCNC: 28.9 MMOL/L (ref 24–28)
HCT VFR BLD AUTO: 22.8 % (ref 40–54)
HGB BLD-MCNC: 7.5 G/DL (ref 14–18)
IMM GRANULOCYTES # BLD AUTO: 0.09 K/UL (ref 0–0.04)
IMM GRANULOCYTES NFR BLD AUTO: 0.7 % (ref 0–0.5)
INR PPP: 2.9 (ref 0.8–1.2)
LDH SERPL L TO P-CCNC: 485 U/L (ref 110–260)
LYMPHOCYTES # BLD AUTO: 0.8 K/UL (ref 1–4.8)
LYMPHOCYTES NFR BLD: 5.8 % (ref 18–48)
MAGNESIUM SERPL-MCNC: 2 MG/DL (ref 1.6–2.6)
MCH RBC QN AUTO: 29 PG (ref 27–31)
MCHC RBC AUTO-ENTMCNC: 32.9 G/DL (ref 32–36)
MCV RBC AUTO: 88 FL (ref 82–98)
MODE: ABNORMAL
MONOCYTES # BLD AUTO: 0.9 K/UL (ref 0.3–1)
MONOCYTES NFR BLD: 6.4 % (ref 4–15)
NEUTROPHILS # BLD AUTO: 11.3 K/UL (ref 1.8–7.7)
NEUTROPHILS NFR BLD: 85.9 % (ref 38–73)
NRBC BLD-RTO: 0 /100 WBC
PCO2 BLDA: 43.2 MMHG (ref 35–45)
PH SMN: 7.43 [PH] (ref 7.35–7.45)
PHOSPHATE SERPL-MCNC: 3 MG/DL (ref 2.7–4.5)
PLATELET # BLD AUTO: 432 K/UL (ref 150–450)
PMV BLD AUTO: 10.7 FL (ref 9.2–12.9)
PO2 BLDA: 27 MMHG (ref 40–60)
POC BE: 5 MMOL/L
POC SATURATED O2: 52 % (ref 95–100)
POC TCO2: 30 MMOL/L (ref 24–29)
POCT GLUCOSE: 142 MG/DL (ref 70–110)
POCT GLUCOSE: 142 MG/DL (ref 70–110)
POCT GLUCOSE: 208 MG/DL (ref 70–110)
POCT GLUCOSE: 96 MG/DL (ref 70–110)
POTASSIUM SERPL-SCNC: 3.7 MMOL/L (ref 3.5–5.1)
PREALB SERPL-MCNC: 14 MG/DL (ref 20–43)
PROT SERPL-MCNC: 6.4 G/DL (ref 6–8.4)
PROT SERPL-MCNC: 6.4 G/DL (ref 6–8.4)
PROTHROMBIN TIME: 29.1 SEC (ref 9–12.5)
PROVIDER NOTIFIED: ABNORMAL
RBC # BLD AUTO: 2.59 M/UL (ref 4.6–6.2)
SAMPLE: ABNORMAL
SITE: ABNORMAL
SODIUM SERPL-SCNC: 140 MMOL/L (ref 136–145)
WBC # BLD AUTO: 13.2 K/UL (ref 3.9–12.7)

## 2023-12-22 PROCEDURE — 86706 HEP B SURFACE ANTIBODY: CPT | Performed by: NURSE PRACTITIONER

## 2023-12-22 PROCEDURE — 86709 HEPATITIS A IGM ANTIBODY: CPT | Performed by: NURSE PRACTITIONER

## 2023-12-22 PROCEDURE — 20600001 HC STEP DOWN PRIVATE ROOM

## 2023-12-22 PROCEDURE — 87340 HEPATITIS B SURFACE AG IA: CPT | Performed by: NURSE PRACTITIONER

## 2023-12-22 PROCEDURE — 97112 NEUROMUSCULAR REEDUCATION: CPT

## 2023-12-22 PROCEDURE — 99232 SBSQ HOSP IP/OBS MODERATE 35: CPT | Mod: ,,, | Performed by: NURSE PRACTITIONER

## 2023-12-22 PROCEDURE — 84100 ASSAY OF PHOSPHORUS: CPT | Performed by: INTERNAL MEDICINE

## 2023-12-22 PROCEDURE — 82330 ASSAY OF CALCIUM: CPT | Mod: 91 | Performed by: STUDENT IN AN ORGANIZED HEALTH CARE EDUCATION/TRAINING PROGRAM

## 2023-12-22 PROCEDURE — 63600175 PHARM REV CODE 636 W HCPCS: Performed by: RADIOLOGY

## 2023-12-22 PROCEDURE — 97535 SELF CARE MNGMENT TRAINING: CPT

## 2023-12-22 PROCEDURE — D9220A PRA ANESTHESIA: Mod: ANES,,, | Performed by: ANESTHESIOLOGY

## 2023-12-22 PROCEDURE — 27000248 HC VAD-ADDITIONAL DAY

## 2023-12-22 PROCEDURE — D9220A PRA ANESTHESIA: ICD-10-PCS | Mod: ANES,,, | Performed by: ANESTHESIOLOGY

## 2023-12-22 PROCEDURE — 25000003 PHARM REV CODE 250: Performed by: STUDENT IN AN ORGANIZED HEALTH CARE EDUCATION/TRAINING PROGRAM

## 2023-12-22 PROCEDURE — 99233 SBSQ HOSP IP/OBS HIGH 50: CPT | Mod: ,,, | Performed by: NURSE PRACTITIONER

## 2023-12-22 PROCEDURE — 85025 COMPLETE CBC W/AUTO DIFF WBC: CPT | Performed by: INTERNAL MEDICINE

## 2023-12-22 PROCEDURE — 92526 ORAL FUNCTION THERAPY: CPT

## 2023-12-22 PROCEDURE — 86580 TB INTRADERMAL TEST: CPT | Performed by: NURSE PRACTITIONER

## 2023-12-22 PROCEDURE — 25000003 PHARM REV CODE 250: Performed by: INTERNAL MEDICINE

## 2023-12-22 PROCEDURE — 25000003 PHARM REV CODE 250: Performed by: PHYSICIAN ASSISTANT

## 2023-12-22 PROCEDURE — 25000242 PHARM REV CODE 250 ALT 637 W/ HCPCS: Performed by: INTERNAL MEDICINE

## 2023-12-22 PROCEDURE — 63600175 PHARM REV CODE 636 W HCPCS: Performed by: PHYSICIAN ASSISTANT

## 2023-12-22 PROCEDURE — 25000003 PHARM REV CODE 250

## 2023-12-22 PROCEDURE — 80053 COMPREHEN METABOLIC PANEL: CPT | Performed by: INTERNAL MEDICINE

## 2023-12-22 PROCEDURE — 83615 LACTATE (LD) (LDH) ENZYME: CPT | Performed by: STUDENT IN AN ORGANIZED HEALTH CARE EDUCATION/TRAINING PROGRAM

## 2023-12-22 PROCEDURE — 02H633Z INSERTION OF INFUSION DEVICE INTO RIGHT ATRIUM, PERCUTANEOUS APPROACH: ICD-10-PCS | Performed by: INTERNAL MEDICINE

## 2023-12-22 PROCEDURE — 99900035 HC TECH TIME PER 15 MIN (STAT)

## 2023-12-22 PROCEDURE — 0JH63XZ INSERTION OF TUNNELED VASCULAR ACCESS DEVICE INTO CHEST SUBCUTANEOUS TISSUE AND FASCIA, PERCUTANEOUS APPROACH: ICD-10-PCS | Performed by: INTERNAL MEDICINE

## 2023-12-22 PROCEDURE — 97530 THERAPEUTIC ACTIVITIES: CPT

## 2023-12-22 PROCEDURE — D9220A PRA ANESTHESIA: ICD-10-PCS | Mod: CRNA,,, | Performed by: NURSE ANESTHETIST, CERTIFIED REGISTERED

## 2023-12-22 PROCEDURE — 83880 ASSAY OF NATRIURETIC PEPTIDE: CPT | Performed by: STUDENT IN AN ORGANIZED HEALTH CARE EDUCATION/TRAINING PROGRAM

## 2023-12-22 PROCEDURE — 85610 PROTHROMBIN TIME: CPT | Performed by: INTERNAL MEDICINE

## 2023-12-22 PROCEDURE — 94761 N-INVAS EAR/PLS OXIMETRY MLT: CPT | Mod: XB

## 2023-12-22 PROCEDURE — 83735 ASSAY OF MAGNESIUM: CPT | Performed by: INTERNAL MEDICINE

## 2023-12-22 PROCEDURE — 63600175 PHARM REV CODE 636 W HCPCS: Performed by: NURSE ANESTHETIST, CERTIFIED REGISTERED

## 2023-12-22 PROCEDURE — C9113 INJ PANTOPRAZOLE SODIUM, VIA: HCPCS | Performed by: PHYSICIAN ASSISTANT

## 2023-12-22 PROCEDURE — 86140 C-REACTIVE PROTEIN: CPT | Performed by: STUDENT IN AN ORGANIZED HEALTH CARE EDUCATION/TRAINING PROGRAM

## 2023-12-22 PROCEDURE — 25000003 PHARM REV CODE 250: Performed by: NURSE PRACTITIONER

## 2023-12-22 PROCEDURE — D9220A PRA ANESTHESIA: Mod: CRNA,,, | Performed by: NURSE ANESTHETIST, CERTIFIED REGISTERED

## 2023-12-22 PROCEDURE — 84134 ASSAY OF PREALBUMIN: CPT | Performed by: NURSE PRACTITIONER

## 2023-12-22 PROCEDURE — 82803 BLOOD GASES ANY COMBINATION: CPT

## 2023-12-22 PROCEDURE — 25000003 PHARM REV CODE 250: Performed by: ANESTHESIOLOGY

## 2023-12-22 PROCEDURE — 86704 HEP B CORE ANTIBODY TOTAL: CPT | Performed by: NURSE PRACTITIONER

## 2023-12-22 PROCEDURE — 97116 GAIT TRAINING THERAPY: CPT

## 2023-12-22 PROCEDURE — 94664 DEMO&/EVAL PT USE INHALER: CPT

## 2023-12-22 PROCEDURE — 30200315 PPD INTRADERMAL TEST REV CODE 302: Performed by: NURSE PRACTITIONER

## 2023-12-22 RX ORDER — WARFARIN SODIUM 5 MG/1
5 TABLET ORAL DAILY
Status: DISCONTINUED | OUTPATIENT
Start: 2023-12-23 | End: 2023-12-24

## 2023-12-22 RX ORDER — CALCIUM GLUCONATE 20 MG/ML
1 INJECTION, SOLUTION INTRAVENOUS ONCE
Status: COMPLETED | OUTPATIENT
Start: 2023-12-22 | End: 2023-12-22

## 2023-12-22 RX ORDER — HEPARIN SODIUM 1000 [USP'U]/ML
INJECTION, SOLUTION INTRAVENOUS; SUBCUTANEOUS
Status: COMPLETED | OUTPATIENT
Start: 2023-12-22 | End: 2023-12-22

## 2023-12-22 RX ORDER — HYDROMORPHONE HYDROCHLORIDE 1 MG/ML
INJECTION, SOLUTION INTRAMUSCULAR; INTRAVENOUS; SUBCUTANEOUS
Status: DISCONTINUED | OUTPATIENT
Start: 2023-12-22 | End: 2023-12-22

## 2023-12-22 RX ORDER — CEFAZOLIN SODIUM 1 G/3ML
INJECTION, POWDER, FOR SOLUTION INTRAMUSCULAR; INTRAVENOUS
Status: DISCONTINUED | OUTPATIENT
Start: 2023-12-22 | End: 2023-12-22

## 2023-12-22 RX ORDER — SODIUM CHLORIDE 9 MG/ML
INJECTION, SOLUTION INTRAVENOUS ONCE
Status: COMPLETED | OUTPATIENT
Start: 2023-12-23 | End: 2023-12-23

## 2023-12-22 RX ORDER — PROPOFOL 10 MG/ML
VIAL (ML) INTRAVENOUS
Status: DISCONTINUED | OUTPATIENT
Start: 2023-12-22 | End: 2023-12-22

## 2023-12-22 RX ADMIN — PANTOPRAZOLE SODIUM 40 MG: 40 INJECTION, POWDER, FOR SOLUTION INTRAVENOUS at 12:12

## 2023-12-22 RX ADMIN — PSYLLIUM HUSK 1 PACKET: 3.4 POWDER ORAL at 12:12

## 2023-12-22 RX ADMIN — MIDODRINE HYDROCHLORIDE 15 MG: 5 TABLET ORAL at 09:12

## 2023-12-22 RX ADMIN — GABAPENTIN 125 MG: 250 SOLUTION ORAL at 09:12

## 2023-12-22 RX ADMIN — INSULIN ASPART 5 UNITS: 100 INJECTION, SOLUTION INTRAVENOUS; SUBCUTANEOUS at 04:12

## 2023-12-22 RX ADMIN — POLYETHYLENE GLYCOL 3350 17 G: 17 POWDER, FOR SOLUTION ORAL at 12:12

## 2023-12-22 RX ADMIN — MIRTAZAPINE 15 MG: 15 TABLET, FILM COATED ORAL at 08:12

## 2023-12-22 RX ADMIN — SENNOSIDES AND DOCUSATE SODIUM 2 TABLET: 8.6; 5 TABLET ORAL at 12:12

## 2023-12-22 RX ADMIN — AMIODARONE HYDROCHLORIDE 400 MG: 200 TABLET ORAL at 12:12

## 2023-12-22 RX ADMIN — SODIUM CHLORIDE 250 ML: 0.9 INJECTION, SOLUTION INTRAVENOUS at 12:12

## 2023-12-22 RX ADMIN — INSULIN ASPART 5 UNITS: 100 INJECTION, SOLUTION INTRAVENOUS; SUBCUTANEOUS at 08:12

## 2023-12-22 RX ADMIN — HYDROMORPHONE HYDROCHLORIDE 0.1 MG: 1 INJECTION, SOLUTION INTRAMUSCULAR; INTRAVENOUS; SUBCUTANEOUS at 09:12

## 2023-12-22 RX ADMIN — Medication: at 09:12

## 2023-12-22 RX ADMIN — ACETAMINOPHEN 1000 MG: 500 TABLET ORAL at 02:12

## 2023-12-22 RX ADMIN — TUBERCULIN PURIFIED PROTEIN DERIVATIVE 5 UNITS: 5 INJECTION, SOLUTION INTRADERMAL at 02:12

## 2023-12-22 RX ADMIN — PROPOFOL 10 MG: 10 INJECTION, EMULSION INTRAVENOUS at 09:12

## 2023-12-22 RX ADMIN — MIDODRINE HYDROCHLORIDE 15 MG: 5 TABLET ORAL at 02:12

## 2023-12-22 RX ADMIN — INSULIN ASPART 2 UNITS: 100 INJECTION, SOLUTION INTRAVENOUS; SUBCUTANEOUS at 09:12

## 2023-12-22 RX ADMIN — VENLAFAXINE 37.5 MG: 37.5 TABLET ORAL at 12:12

## 2023-12-22 RX ADMIN — CEFAZOLIN 1 G: 330 INJECTION, POWDER, FOR SOLUTION INTRAMUSCULAR; INTRAVENOUS at 09:12

## 2023-12-22 RX ADMIN — ACETAMINOPHEN 1000 MG: 500 TABLET ORAL at 08:12

## 2023-12-22 RX ADMIN — CALCIUM GLUCONATE 1 G: 20 INJECTION, SOLUTION INTRAVENOUS at 01:12

## 2023-12-22 RX ADMIN — HYDROMORPHONE HYDROCHLORIDE 0.2 MG: 1 INJECTION, SOLUTION INTRAMUSCULAR; INTRAVENOUS; SUBCUTANEOUS at 09:12

## 2023-12-22 RX ADMIN — HEPARIN SODIUM 3200 UNITS: 1000 INJECTION, SOLUTION INTRAVENOUS; SUBCUTANEOUS at 10:12

## 2023-12-22 RX ADMIN — SODIUM CHLORIDE: 9 INJECTION, SOLUTION INTRAVENOUS at 10:12

## 2023-12-22 NOTE — NURSING TRANSFER
Nursing Transfer Note      12/21/2023   11:04 PM    Nurse giving handoff: KEARA Cherry RN  Nurse receiving handoff: Ruthie CARD    Reason patient is being transferred: step down    Transfer To: 312 on CSU    Transfer via bed    Transfer with telemetry; activated prior to transfer  Transported by nursing    Transfer Vital Signs:  Blood Pressure:84/72  Heart Rate:90  O2:95  Temperature:98.2  Respirations:22    Telemetry: Box Number [id number]  Order for Tele Monitor? Yes    Additional Lines: VAD drive line    4eyes on Skin: yes    Medicines sent: insulin pens    Any special needs or follow-up needed: n/a    Patient belongings transferred with patient: Yes    Chart send with patient: Yes    Notified: spouse at bedside    Patient reassessed at:  12/21/2023 at 1930  1  Upon arrival to floor: patient oriented to room, call bell in reach, and bed in lowest position; nursing receiving pt at bedside

## 2023-12-22 NOTE — PT/OT/SLP PROGRESS
Speech Language Pathology Treatment    Patient Name:  Radha Abbott   MRN:  [id number]  Admitting Diagnosis: LVAD (left ventricular assist device) present    Recommendations:                 General Recommendations:  Dysphagia therapy  Diet recommendations:  NPO, Liquid Diet Level: NPO   Aspiration Precautions: Strict aspiration precautions   General Precautions: Standard, aspiration, fall, LVAD, NPO, sternal  Communication strategies:  none    Assessment:     Radha Abbott is a 63 y.o. male with an SLP diagnosis of Dysphagia.     Subjective     Awake/alert   Spouse at bedside    Pain/Comfort:  Pain Rating 1: 0/10  Pain Rating Post-Intervention 1: 0/10    Respiratory Status: Room air    Objective:     Has the patient been evaluated by SLP for swallowing?   Yes  Keep patient NPO? Yes     Pt in bed with spouse at the bedside. PT and OT also present completing therapy session. Pt pending procedure/ PICC line placement and no PO trials offered. Despite true vocal fold augmentation vocal quality while significantly improved does remain hoarse sounding in nature. Since stepping down from ICU SLP reviewed with RN and spouse results from MBS with silent aspiration across all consistencies. SLP emphasized importance of completed swallow exercises (hand written copy provided) twice daily. SLP demonstrated each exercise, supgraglottic swallow, akilah maneuver, falsetto 'ee' and effortful swallow. Spouse able to teachback as will be primary caregiver completing exercises outside of structured therapy sessions. SLP discussed with RN pt permitted ice chips for the purpose of completing swallow exercises. SLP reviewed swallow anatomy and function and timeline for repeat study. SLP also briefly offered education within SLP scope, more long term supplemental nutrition/hydration/medication should swallow status not improve.  SLP also discussed timeline for repeating swallow study and trial of 2x/day treatments prior to repeat study  (minimum 1.5 weeks from initial study). All partied demonstrating understanding and agreement with plan.  Ongoing NPO diet + NG tube feeds with strict aspiration precautions given pt SILENTLY ASPIRATES all consistencies.  SLP discussed plan with VAD team.     Goals:   Multidisciplinary Problems       SLP Goals          Problem: SLP    Goal Priority Disciplines Outcome   SLP Goal     SLP    Description: Speech Language Pathology Goals  Goals expected to be met by 12/24    1. Pt will participate in ongoing assessment of swallow function to help determine least restrictive diet                            Plan:     Patient to be seen:  4 x/week     Plan of Care reviewed with:  patient, spouse   SLP Follow-Up:  Yes       Discharge recommendations:  High Intensity Therapy       Time Tracking:     SLP Treatment Date:   12/22/23  Speech Start Time:  0821  Speech Stop Time:  0839     Speech Total Time (min):  18 min    Billable Minutes: Treatment Swallowing Dysfunction 9  and Self Care/Home Management Training 9    12/22/2023

## 2023-12-22 NOTE — PLAN OF CARE
Pt to be transferred by PACU 6 for recovery. Remains under the care of anesthesia team for transfer. LVAD RN remains at bedside.

## 2023-12-22 NOTE — PLAN OF CARE
Problem: Physical Therapy  Goal: Physical Therapy Goal  Description: Goals to be met by: 23     Patient will increase functional independence with mobility by performin. Sit to stand transfer with modified independence  2. Bed to chair transfer with supervision  3. Gait  x 150 feet with supervision using physician approved AD with standing rest breaks prn.   4. Lower extremity exercise program x30 reps per handout, with independence  5. Recite 3/3 sternal precautions and remain complaint with precautions throughout session with no verbal cues      Outcome: Ongoing, Progressing   Goals remain appropriate 2023

## 2023-12-22 NOTE — PROGRESS NOTES
[facility] - Cardiology Step[facility]  Heart Transplant  Progress Note    Patient Name: Radha Abbott  MRN: [id number]  Admission Date: 11/9/2023  Hospital Length of Stay: 43 days  Attending Physician: Juventino Bermudez Jr.*  Primary Care Provider: Vasu Kong MD  Principal Problem:LVAD (left ventricular assist device) present    Subjective:   Interval History: Tolerated HD trial and was transferred to step down, Plan for tunneled HD cath replacement today then will D/C RIJ TLC. Tolerating tube feeds - failed MBSS on 12.20 so remains NPO. Have liquid to normal BM's. Stood with max assist of 2.     Continuous Infusions:   sodium chloride 0.9% Stopped (12/17/23 2300)    sodium chloride 0.9%      dextrose 10 % in water (D10W)       Scheduled Meds:   acetaminophen  1,000 mg Per NG tube TID    amiodarone  400 mg Per NG tube Daily    balsam peru-castor oiL   Topical (Top) BID    calcium gluconate IVPB  1 g Intravenous Once    gabapentin  125 mg Per NG tube Q12H    insulin aspart U-100  5 Units Subcutaneous 6 times per day    midodrine  15 mg Per NG tube TID    mirtazapine  15 mg Per NG tube Nightly    pantoprazole  40 mg Intravenous Daily    polyethylene glycol  17 g Per NG tube Daily    psyllium husk (aspartame)  1 packet Per NG tube Daily    senna-docusate 8.6-50 mg  2 tablet Per NG tube Daily    tuberculin  5 Units Intradermal Once    venlafaxine  37.5 mg Per G Tube Daily    [START ON 12/23/2023] warfarin  5 mg Per G Tube Daily     PRN Meds:0.9%  NaCl infusion (for blood administration), albuterol sulfate, bisacodyL, dextrose 10 % in water (D10W), dextrose 10%, dextrose 10%, glucagon (human recombinant), insulin aspart U-100, melatonin, oxyCODONE, QUEtiapine, Flushing PICC/Midline Protocol **AND** [DISCONTINUED] sodium chloride 0.9% **AND** sodium chloride 0.9%    Review of patient's allergies indicates:  No Known Allergies  Objective:     Vital Signs (Most Recent):  Temp: 98 °F (36.7 °C) (12/22/23 0359)  Pulse: 86  (12/22/23 0606)  Resp: 16 (12/22/23 0359)  BP: (!) 78/0 (12/22/23 0359)  SpO2: (!) 92 % (12/22/23 0359) Vital Signs (24h Range):  Temp:  [98 °F (36.7 °C)-98.7 °F (37.1 °C)] 98 °F (36.7 °C)  Pulse:  [] 86  Resp:  [14-44] 16  SpO2:  [92 %-96 %] 92 %  BP: (68-80)/(0) 78/0  Arterial Line BP: ()/() 83/64     No data found.  Body mass index is 24.55 kg/m².      Intake/Output Summary (Last 24 hours) at 12/22/2023 0843  Last data filed at 12/22/2023 0539  Gross per 24 hour   Intake 1659.5 ml   Output 1800 ml   Net -140.5 ml       Hemodynamic Parameters:  CVP:  [7 mmHg] 7 mmHg    Telemetry: SR       Physical Exam  Constitutional:       Comments: Cachectic, temporal wasting   HENT:      Head: Normocephalic and atraumatic.   Eyes:      Conjunctiva/sclera: Conjunctivae normal.      Pupils: Pupils are equal, round, and reactive to light.   Neck:      Comments: RIJ trialysis line. Do not appreciate elevated JVP  Cardiovascular:      Rate and Rhythm: Normal rate and regular rhythm.      Comments: Smooth VAD hum  Pulmonary:      Effort: Pulmonary effort is normal.      Breath sounds: Normal breath sounds.   Abdominal:      General: Bowel sounds are normal.      Palpations: Abdomen is soft.   Musculoskeletal:         General: No swelling. Normal range of motion.      Cervical back: Normal range of motion and neck supple.   Skin:     General: Skin is warm and dry.      Capillary Refill: Capillary refill takes 2 to 3 seconds.   Neurological:      General: No focal deficit present.      Mental Status: He is alert and oriented to person, place, and time.            Significant Labs:  CBC:  Recent Labs   Lab 12/20/23  0258 12/20/23  2049 12/21/23  0308 12/22/23  0410   WBC 15.22*  --  13.34* 13.20*   RBC 2.31*  --  2.17* 2.59*   HGB 7.1*  --  6.5* 7.5*   HCT 22.2* 21* 20.7* 22.8*     --  407 432   MCV 96  --  95 88   MCH 30.7  --  30.0 29.0   MCHC 32.0  --  31.4* 32.9     BNP:  Recent Labs   Lab 12/18/23  0414  12/20/23  0258 12/22/23  0410   BNP 1,484* 1,130* 1,628*     CMP:  Recent Labs   Lab 12/20/23  0731 12/21/23  0308 12/22/23  0410   * 177* 86   CALCIUM 8.1* 8.2* 8.0*   ALBUMIN 1.7* 1.7* 1.8*  1.8*   PROT 6.1 6.0 6.4  6.4    136 140   K 4.8 4.8 3.7   CO2 22* 19* 26    106 100   BUN 31* 51* 35*   CREATININE 2.0* 3.2* 2.6*   ALKPHOS 219* 206* 215*  215*   ALT 30 29 28  28   AST 61* 56* 50*  50*   BILITOT 0.8 0.6 0.7  0.7      Coagulation:   Recent Labs   Lab 12/18/23  0417 12/19/23  0419 12/20/23  0258 12/21/23  0352 12/22/23  0410   INR 1.9* 1.8* 2.0* 2.4* 2.9*   APTT 48.3* 46.2* 47.0*  --   --      LDH:  Recent Labs   Lab 12/20/23  0258 12/21/23  0308 12/22/23  0410   * 455* 485*     Microbiology:  Microbiology Results (last 7 days)       Procedure Component Value Units Date/Time    Fungus culture [[id number]] Collected: 12/06/23 1532    Order Status: Completed Specimen: Body Fluid from Lung, Right Updated: 12/20/23 0949     Fungus (Mycology) Culture Culture in progress      No fungus isolated after 2 weeks    Narrative:      Bronchial Wash    Blood culture [[id number]] Collected: 12/10/23 1240    Order Status: Completed Specimen: Blood from Peripheral, Antecubital, Right Updated: 12/15/23 2012     Blood Culture, Routine No growth after 5 days.    Blood culture [[id number]] Collected: 12/10/23 1149    Order Status: Completed Specimen: Blood from Line, Jugular, External Right Updated: 12/15/23 2012     Blood Culture, Routine No growth after 5 days.            I have reviewed all pertinent labs within the past 24 hours.    Estimated Creatinine Clearance: 32.7 mL/min (A) (based on SCr of 2.6 mg/dL (H)).    Diagnostic Results:  I have reviewed and interpreted all pertinent imaging results/findings within the past 24 hours.  Assessment and Plan:     63 year old male with hx of CAD s/p 3v CABG (unclear anatomy, 2009), ICM with a recent EF of 15-20% s/p ICD (medmauricio 2009), DM2 (a1c 7.7),  HTN, HLD, Vfib on amio who presents to the ED with CC of SOB     Pt was recently admitted to [facility] as a transfer.  He was started on a Lasix gtt and did well.  Started on gDMT and discharged home on  5 with plans to follow up in \[facility]\"" clinic for ongoing transplant evaluation at another facility.  He says that about a few days ago he started to notice LE swelling.  This turned into Nelson and orthopnea.  He says he can't walk to the bathroom without getting SOB.  He also complains of weight gain.  He takes torsemide 20mg BID at home and was told to trial additional Lasix which he did without any improvement.  He was rx metolazone but has not been filled.  He came to the ED     In the ED he was AF with stable VS on RA.  CBC showing chronic anemia.  CMP notable for acute on chronic CKD with baseline around 2.1 and Cr now 2.6.  BNP elevated.  Lactate neg.  CXR showing pulmonary edema.  I evaluated the pt at the bedside.  Bedside TTE showing CVP >15.  He was subsequently admitted to the CCU for diuresis.     He was continued on his home  and was started on a lasix gtt, which he responded well to overnight (net -1700cc. He feels much better this morning as well. Our transplant coordinators have been working on insurance approval and he is now being transferred to \[facility]\"" service for transplant evaluation.     * LVAD (left ventricular assist device) present  -HeartMate 3 Implanted  12/1/2023  as DT  -HTS Primary  -Implanted by Dr. Washington  -Continue Coumadin, dosing by PharmD.  Goal INR 2.0-3.0 . Therapeutic today. Discontinue Heparin bridge   -Antiplatelets Not on ASA  -LDH is stable overall today. Will continue to monitor daily.  -Speed set at  4900   rpm, LSL 4500 rpm   -Interrogation notable for no events  -ECHO 12/11 AV does not open, IVS midline, LVEDD 6.1, with HM3 speed set to 4900 rpm  -Not listed for OHTx, declined for OHTX due to comorbidities         Procedure: Device Interrogation Including analysis of device  parameters  Current Settings: Ventricular Assist Device  Review of device function is stable/unstable stable        12/21/2023    12:00 PM 12/21/2023    10:00 AM 12/21/2023     9:00 AM 12/21/2023     7:00 AM 12/21/2023     6:00 AM 12/21/2023     5:00 AM 12/21/2023     4:00 AM   TXP LVAD INTERROGATIONS   Type     HeartMate3 HeartMate3 HeartMate3   Flow 4.4 4 4 4.1 4 4.1 4.1   Speed 4900 4900 4900 4900 4900 4900 4900   PI 3.5 4.6 4.5 3.8 4.5 4.5 3.9   Power ([place]) 3.2 3.3 3.2 3.2 3.2 3.2 3.2   LSL 4500 4500 4500 4500 4500 4500 4500   Pulsatility Intermittent pulse Intermittent pulse Intermittent pulse Intermittent pulse Intermittent pulse Intermittent pulse Intermittent pulse         Right foot pain  -Neuropathic pain to plantar portion of right foot  -Continue gabapentin 125 mg BID   -Added venlafaxine     Cachexia  -Daily caloric intake with tube feeds increased to 2160 calories, 98 g protein    Unilateral complete vocal fold paralysis  -Appreciate ENT's help  -S/P augmentation of paralyzed left vocal cord 12/18    Dysphonia  -Speech following closely  -Appreciate ENT's help. S/P augmentation of paralyzed left vocal cord 12/18    Moderate malnutrition  -RD and SLP following  -Tolerating tube feeds. Total daily caloric intake increased to 2160  -Failed MBBS 12/20, continue TF    Leukocytosis  -WBC peaked at 33k, trending down at 15k. Afebrile  -Concern for septic shock. Was also on hydrocortisone with increasing pressor requirements, now discontinued   -ID consulted, started on empiric micafungin, meropenem, and vanc, since completed  -Cultures were all -ve  -Completed course for possible pneumonia, ID signed off     Adjustment disorder with anxiety  -Continue Remeron 15 mg q HS    Depressed mood  -Psychiatry consulted for depressed mood, SI when left alone  -Recommend sitter when alone (possibly with transition to stepdown).  -Continue Mirtazapine  -Psychology also following  -Added venlafaxine 12/20         IVÁN  (acute kidney injury)  -Nephrology following      Altered mental status  -multifactorial  -likely metabolic encephalopathy +/- icu delirium   -CTH 12/7 negative for acute process  -Continue RRT for metabolic clearance as needed  -EEG done over the weekend no seizures detected, just generalized slowing  -provide frequent re-orientation  -Promote sleep/wake cycle   -Neurology consulted   - Much more awake, continue to encourage and motivate    Acute on chronic combined systolic and diastolic CHF, NYHA class 4  Pt with known ICM with an EF of 25% on home  presented with decompensated HF.      -lRHC 11/14 RA 14, PA 70/35, PCWP 32, CO 4.1, CI 2.1  -Repeat RHC 11/20 RA 20, PA 60/29 (39), PCWP 29, CO 4.6, CI 2.3  -ECHO 11/21 showed EF 15-20%, mild RV dysfunction, mild MR, LVED 6.9   -Home  5   -Home GDMT: Entreso 24/26 BID (on hold 2/2 IVÁN), hydralazine 25 q 8hr, all held prior to LVAD implant  -Current GDMT: Continue Hyd/Isordil, Farxiga. Will transition Valsartan to Entresto and add Aldactone  -Home diuretic: Lasix 40 mg BID   -IABP inserted 11/21 and underwent DT HM3 implant 12/1, chest closed 12/4  -LVAD speed increased to 4900 rpm on 12/7   -ECHO 12/11 AV does not open, IVS midline, LVEDD 6.1, with HM3 speed set to 4900 rpm  -Increased pressor requirements with Giapreza, Ketty, epi, and vaso 12/11. Able to wean off giapreza overnight 12/11. He has been weaned off all pressors and inotropes  -CVP 9, SVO2 55%, CO 7.8, CI 3.8,   -No significant hypotension. Remains on Midodrine 15 TID      PAF (paroxysmal atrial fibrillation)  Known hx of pAF. In sinus rhythm on admission, but 1 run of AF RVR overnight. He spontaneously converted.  - Continue home amiodarone 400mg qd  - Continue AC      Acute renal failure with acute tubular necrosis superimposed on stage 3b chronic kidney disease  IVÁN on CKD2. Baseline Cr of 1.7-2.0.   - Hold ARNi  -Trend BMPs daily   -Nephrology following   -SLED/SCUF for volume removal  began on 12/1. He is anuric  -Did not respond to diuretic challenge with 240 mg IV Lasix, 1000 mg Diuril, and 500 mg IV Diamox done 12/14  -Plan for HD trial today   -IR consulted for tunneled trialysis line (current RIJ trialysis 9 days old)     Type 2 diabetes mellitus without complication, without long-term current use of insulin  -A1c 7.6, not insulin dependent  - Endocrine following, appreciate assistance with blood glucose management    CAD (coronary artery disease)  -S/p 3vCABG in 2009  -Lipitor on hold 2/2 mild transaminitis  -Not on ASA post VAD    Ventricular tachycardia  Hx VT s/p ICD placement (medtronic 2009)  - Continue home amio 400[place] [id number]  Heart Transplant  [facility] - Cardiology Stepdown

## 2023-12-22 NOTE — CARE UPDATE
-Glucose Goal 140-180    -A1C:   Hemoglobin A1C   Date Value Ref Range Status   10/12/2023 7.6 (H) 4.0 - 5.6 % Final     Comment:     ADA Screening Guidelines:  5.7-6.4%  Consistent with prediabetes  >or=6.5%  Consistent with diabetes    High levels of fetal hemoglobin interfere with the HbA1C  assay. Heterozygous hemoglobin variants (HbS, HgC, etc)do  not significantly interfere with this assay.   However, presence of multiple variants may affect accuracy.           -HOME REGIMEN: metformin     -GLUCOSE TREND FOR THE PAST 24HRS:   Recent Labs   Lab 12/21/23  0306 12/21/23  0801 12/21/23  1640 12/21/23  2009 12/21/23  2351 12/22/23  0403   POCTGLUCOSE 178* 120* 151* 176* 135* 96           -NO HYPOGYCEMIAS NOTED     - Diet  Diet NPO Except for: Medication (per NG tube), patient failed swallow study and remains NPO on TF for now.     -Steroids - none     -Tube Feeds - TF @ 45ml/hr (currently being held for procedure)         Plan:   - Continue Novolog 5 units q 4 hr to cover TF- hold if TF held or BG < 100  - POCT Glucose every 4 hours  - Hypoglycemia protocol in place      ** Please notify Endocrine for any change and/or advance in diet**  ** Please call Endocrine for any BG related issues **     Discharge Planning:   TBD. Please notify endocrinology prior to discharge.

## 2023-12-22 NOTE — PROGRESS NOTES
[facility] - Cardiology Stepdown  Nephrology  Progress Note    Patient Name: Radha Abbott  MRN: [id number]  Admission Date: 11/9/2023  Hospital Length of Stay: 43 days  Attending Provider: Juventino Bermudez Jr.*   Primary Care Physician: Vasu Kong MD  Principal Problem:LVAD (left ventricular assist device) present    Subjective:     Interval History: HD yesterday, tolerated well. UF 1L. TDC placement today. Oxygenating well on RA.     Review of patient's allergies indicates:  No Known Allergies  Current Facility-Administered Medications   Medication Frequency    0.9%  NaCl infusion (for blood administration) Q24H PRN    0.9%  NaCl infusion Continuous    0.9%  NaCl infusion Continuous    [START ON 12/23/2023] 0.9%  NaCl infusion Once    acetaminophen tablet 1,000 mg TID    albuterol sulfate nebulizer solution 2.5 mg Q4H PRN    amiodarone tablet 400 mg Daily    balsam peru-castor oiL Oint BID    bisacodyL suppository 10 mg Daily PRN    dextrose 10 % infusion Continuous PRN    dextrose 10% bolus 125 mL 125 mL PRN    dextrose 10% bolus 250 mL 250 mL PRN    gabapentin 250 mg/5 mL (5 mL) solution 125 mg Q12H    glucagon (human recombinant) injection 1 mg PRN    insulin aspart U-100 pen 0-10 Units Q4H PRN    insulin aspart U-100 pen 5 Units 6 times per day    melatonin tablet 6 mg Nightly PRN    midodrine tablet 15 mg TID    mirtazapine tablet 15 mg Nightly    oxyCODONE immediate release tablet 5 mg Q6H PRN    pantoprazole injection 40 mg Daily    polyethylene glycol packet 17 g Daily    psyllium husk (aspartame) 3.4 gram packet 1 packet Daily    senna-docusate 8.6-50 mg per tablet 2 tablet Daily    sodium chloride 0.9% flush 10 mL PRN    venlafaxine tablet 37.5 mg Daily    [START ON 12/23/2023] warfarin (COUMADIN) tablet 5 mg Daily       Objective:     Vital Signs (Most Recent):  Temp: 98 °F (36.7 °C) (12/22/23 1448)  Pulse: 82 (12/22/23 1448)  Resp: 16 (12/22/23 1448)  BP: 98/72 (12/22/23 1448)  SpO2: 98 %  (12/22/23 1448) Vital Signs (24h Range):  Temp:  [97.9 °F (36.6 °C)-98.7 °F (37.1 °C)] 98 °F (36.7 °C)  Pulse:  [30-96] 82  Resp:  [14-28] 16  SpO2:  [92 %-100 %] 98 %  BP: (72-99)/(0-79) 98/72  Arterial Line BP: ()/() 83/64     Weight:  (pt doesnt stand bed scale broken) (12/22/23 0862)  Body mass index is 24.55 kg/m².  Body surface area is 2.04 meters squared.    I/O last 3 completed shifts:  In: 2284.5 [I.V.:214.5; Blood:350; Other:300; NG/GT:1420]  Out: 1815 [Drains:15; Other:1800]     Physical Exam  Vitals and nursing note reviewed.   Eyes:      Conjunctiva/sclera: Conjunctivae normal.   Cardiovascular:      Comments: LVAD   Pulmonary:      Effort: Pulmonary effort is normal.      Comments: RA  Abdominal:      Palpations: Abdomen is soft.   Musculoskeletal:         General: No swelling.      Right lower leg: No edema.      Left lower leg: No edema.   Skin:     General: Skin is warm.   Neurological:      Mental Status: He is alert and oriented to person, place, and time.   Psychiatric:         Mood and Affect: Mood normal.          Significant Labs:  CBC:   Recent Labs   Lab 12/22/23  0410   WBC 13.20*   RBC 2.59*   HGB 7.5*   HCT 22.8*      MCV 88   MCH 29.0   MCHC 32.9     CMP:   Recent Labs   Lab 12/22/23  0410   GLU 86   CALCIUM 8.0*   ALBUMIN 1.8*  1.8*   PROT 6.4  6.4      K 3.7   CO2 26      BUN 35*   CREATININE 2.6*   ALKPHOS 215*  215*   ALT 28  28   AST 50*  50*   BILITOT 0.7  0.7     All labs within the past 24 hours have been reviewed.       Assessment/Plan:     Cardiac/Vascular  * LVAD (left ventricular assist device) present  -management per primary     Renal/  Acute renal failure with acute tubular necrosis superimposed on stage 3b chronic kidney disease  - suspect acute tubular injury secondary to hypotension/cardiogenic shock likely compounded by intra-arterial contrast and anemia with urinary sediment with granular casts    -Plan for iHD tomorrow 12/23, UF  goal 1-2L  -Continue to monitor for signs of renal recovery   - renal diet/tube feeds when not NPO, volume restriction per primary team  - strict I/O's and daily weights  - renally all dose medications to eGFR  - avoid nephrotoxic agents wean feasible (i.e. NSAIDs, intra-arterial contrast, supra-therapeutic vancomycin levels, etc.)  - Will reeval RRT needs daily         Thank you for your consult. I will follow-up with patient. Please contact us if you have any additional questions.    Yolanda Cai DNP  Nephrology  [facility] - Cardiology Stepdown

## 2023-12-22 NOTE — PT/OT/SLP PROGRESS
Occupational Therapy  Co- Treatment    Name: Radha Abbott  MRN: [id number]  Admitting Diagnosis:  LVAD (left ventricular assist device) present  18 Days Post-Op    Recommendations:     Discharge Recommendations: High Intensity Therapy  Discharge Equipment Recommendations:   (still assessing DME needs)  Barriers to discharge:  None    Assessment:     Radha Abbott is a 63 y.o. male with a medical diagnosis of LVAD (left ventricular assist device) present.  He presents with improvements in standing balance today, able to increase standing time. Patient continues to require significant physical and verbal cues for posturing during stand. Patient found on wall power and writing OT switching to battery due to leaving for transport at end of session. Performance deficits affecting function are weakness, impaired endurance, impaired self care skills, impaired functional mobility, gait instability, impaired balance, decreased upper extremity function, decreased lower extremity function, pain, impaired skin. Patient has demonstrated sufficient progression to warrant high intensity therapy evidenced by objectives noted below.    Rehab Prognosis:  Good; patient would benefit from acute skilled OT services to address these deficits and reach maximum level of function.       Plan:     Patient to be seen 6 x/week to address the above listed problems via self-care/home management, therapeutic activities, therapeutic exercises, neuromuscular re-education  Plan of Care Expires: 01/02/24  Plan of Care Reviewed with: patient, spouse    Subjective     Chief Complaint: weakness  Patient/Family Comments/goals: get better  Pain/Comfort:  Pain Rating 1: 7/10  Location - Side 1: Right  Location - Orientation 1: generalized  Location 1: foot  Pain Addressed 1: Reposition, Distraction  Pain Rating Post-Intervention 1: 7/10    Objective:   Co-treatment with PT performed for patient safety, education, and facilitation of treatment to maximize  activity tolerance and progression towards goals from two skilled therapy disciplines.    Communicated with: nursing prior to session.  Patient found HOB elevated with telemetry, NG tube, LVAD, PICC line, central line upon OT entry to room. Patient's wife present in room during session.     General Precautions: Standard, fall, LVAD    Orthopedic Precautions:N/A  Braces: N/A  Respiratory Status: Room air     Occupational Performance:     Bed Mobility:    Patient completed Supine to Sit with moderate assistance  Patient completed Sit to Supine with maximal assistance and 2 persons     Functional Mobility/Transfers:  Patient completed Sit <> Stand Transfer with maximal assistance and of 2 persons  with  B platform RW   Functional Mobility: patient able to take 1 step forward/back with and B platform RW with maximal assistance of 2 persons     Activities of Daily Living:  Upper Body Dressing: moderate assistance to don gown like jacket  Toileting: total assistance to clean rear mildred in standing      AMPAC 6 Click ADL: 10    Treatment & Education:  Currently requiring moderate assistance of 2 persons for static standing balance; able to stand for ~2 minutes and the 1 min 38s. Patient able to identify 1/3 major LVAD components but with reports of similar terminology. Patient required 2 attempts to identify correct button to run self-test. OT writing snow that patient verbalizing in binder.     Patient educated on:   -purpose of OT and OT POC  -facilitation and education on proper body mechanics, energy conservation, and safety  -importance of early mobility and out of bed activities with staff assist  -overall benefits of therapy     All questions answered within OT scope and to patient's satisfaction    Patient left  on transport stretcher  with all lines intact, call button in reach, and nursing present    GOALS:   Multidisciplinary Problems       Occupational Therapy Goals          Problem: Occupational Therapy     Goal Priority Disciplines Outcome Interventions   Occupational Therapy Goal     OT, PT/OT Ongoing, Progressing    Description: Goals to be met by: 1/2/24     Patient will increase functional independence with ADLs by performing:    UB Dressing with SBA  LE Dressing with Supervision.  Grooming while standing at sink with Supervision.  Toileting from bedside commode with SBA for hygiene and clothing management.   Step transfer to bedside commode with SBA  [place] with VAD management during ADLs                             Time Tracking:     OT Date of Treatment: 12/22/23  OT Start Time: 0755  OT Stop Time: 0829  OT Total Time (min): 34 min    Billable Minutes:Therapeutic Activity 34    OT/PRIETO: OT          12/22/2023

## 2023-12-22 NOTE — NURSING
Rounded on patient, lying in bed, no family at bedside. Pt worked on VAD education today including UBC and batteries. He continues to work on workbook with wife. Pt did receive his trialysis HD line this morning.

## 2023-12-22 NOTE — PLAN OF CARE
Heart Transplant Care Update Note:    Hemodynamics:  CVP:11  SVO2: 58  CO: 7.74  CI: 4.09  SVR: 692    Wt: 82.1, BSA: 2.04, Hb: 6.5    Plan:  - Tolerated HD; plan to be transferred to SDU  - Plan for Tunneled HD catheter placement  - NPO midnight    Discussed with HTS Attending      Yudy Stockton MD  Cardiovascular Disease PGY IV  [facility]

## 2023-12-22 NOTE — ASSESSMENT & PLAN NOTE
- suspect acute tubular injury secondary to hypotension/cardiogenic shock likely compounded by intra-arterial contrast and anemia with urinary sediment with granular casts    -Plan for iHD tomorrow 12/23, UF goal 1-2L  -Continue to monitor for signs of renal recovery   - renal diet/tube feeds when not NPO, volume restriction per primary team  - strict I/O's and daily weights  - renally all dose medications to eGFR  - avoid nephrotoxic agents wean feasible (i.e. NSAIDs, intra-arterial contrast, supra-therapeutic vancomycin levels, etc.)  - Will reeval RRT needs daily

## 2023-12-22 NOTE — ANESTHESIA PREPROCEDURE EVALUATION
12/22/2023  Radha Abbott is a 63 y.o., male.      Pre-op Assessment    I have reviewed the Patient Summary Reports.     I have reviewed the Nursing Notes. I have reviewed the NPO Status.   I have reviewed the Medications.     Review of Systems  Anesthesia Hx:  No problems with previous Anesthesia   History of prior surgery of interest to airway management or planning:  Previous anesthesia: General        Denies Family Hx of Anesthesia complications.    Denies Personal Hx of Anesthesia complications.                    Social:  Non-Smoker       Hematology/Oncology:  Hematology Normal   Oncology Normal                                   EENT/Dental:  EENT/Dental Normal           Cardiovascular:  Exercise tolerance: poor     Past MI CAD       CHF        LVAD                         Pulmonary:  Pulmonary Normal                       Renal/:  Renal/ Normal                 Hepatic/GI:  Hepatic/GI Normal                 Musculoskeletal:  Musculoskeletal Normal                Neurological:  Neurology Normal                                      Endocrine:  Endocrine Normal            Dermatological:  Skin Normal    Psych:  Psychiatric History anxiety                 Physical Exam  General: Well nourished, Cooperative, Alert and Oriented    Airway:  Mallampati: III / II  Mouth Opening: Normal  TM Distance: Normal  Tongue: Normal  Neck ROM: Normal ROM    Dental:  Partial Dentures    Chest/Lungs:  Clear to auscultation, Normal Respiratory Rate    Heart:  Rate: Normal  Rhythm: Regular Rhythm  Sounds: Normal    Abdomen:  Normal, Soft, Nontender        Anesthesia Plan  Type of Anesthesia, risks & benefits discussed:    Anesthesia Type: Gen Supraglottic Airway, Gen Natural Airway, Gen ETT, MAC  Intra-op Monitoring Plan: Standard ASA Monitors  Post Op Pain Control Plan: multimodal analgesia  Induction:  IV  Airway Plan:  Direct, Post-Induction  Informed Consent: Informed consent signed with the Patient and all parties understand the risks and agree with anesthesia plan.  All questions answered.   ASA Score: 4    Ready For Surgery From Anesthesia Perspective.     .

## 2023-12-22 NOTE — SUBJECTIVE & OBJECTIVE
Interval History: Tolerated HD trial and was transferred to step down, Plan for tunneled HD cath replacement today then will D/C RIJ TLC. Tolerating tube feeds - failed MBSS on 12.20 so remains NPO. Have liquid to normal BM's. Stood with max assist of 2.     Continuous Infusions:   sodium chloride 0.9% Stopped (12/17/23 2300)    sodium chloride 0.9%      dextrose 10 % in water (D10W)       Scheduled Meds:   acetaminophen  1,000 mg Per NG tube TID    amiodarone  400 mg Per NG tube Daily    balsam peru-castor oiL   Topical (Top) BID    calcium gluconate IVPB  1 g Intravenous Once    gabapentin  125 mg Per NG tube Q12H    insulin aspart U-100  5 Units Subcutaneous 6 times per day    midodrine  15 mg Per NG tube TID    mirtazapine  15 mg Per NG tube Nightly    pantoprazole  40 mg Intravenous Daily    polyethylene glycol  17 g Per NG tube Daily    psyllium husk (aspartame)  1 packet Per NG tube Daily    senna-docusate 8.6-50 mg  2 tablet Per NG tube Daily    tuberculin  5 Units Intradermal Once    venlafaxine  37.5 mg Per G Tube Daily    [START ON 12/23/2023] warfarin  5 mg Per G Tube Daily     PRN Meds:0.9%  NaCl infusion (for blood administration), albuterol sulfate, bisacodyL, dextrose 10 % in water (D10W), dextrose 10%, dextrose 10%, glucagon (human recombinant), insulin aspart U-100, melatonin, oxyCODONE, QUEtiapine, Flushing PICC/Midline Protocol **AND** [DISCONTINUED] sodium chloride 0.9% **AND** sodium chloride 0.9%    Review of patient's allergies indicates:  No Known Allergies  Objective:     Vital Signs (Most Recent):  Temp: 98 °F (36.7 °C) (12/22/23 0359)  Pulse: 86 (12/22/23 0606)  Resp: 16 (12/22/23 0359)  BP: (!) 78/0 (12/22/23 0359)  SpO2: (!) 92 % (12/22/23 0359) Vital Signs (24h Range):  Temp:  [98 °F (36.7 °C)-98.7 °F (37.1 °C)] 98 °F (36.7 °C)  Pulse:  [] 86  Resp:  [14-44] 16  SpO2:  [92 %-96 %] 92 %  BP: (68-80)/(0) 78/0  Arterial Line BP: ()/() 83/64     No data found.  Body mass  index is 24.55 kg/m².      Intake/Output Summary (Last 24 hours) at 12/22/2023 0843  Last data filed at 12/22/2023 0539  Gross per 24 hour   Intake 1659.5 ml   Output 1800 ml   Net -140.5 ml       Hemodynamic Parameters:  CVP:  [7 mmHg] 7 mmHg    Telemetry: SR       Physical Exam  Constitutional:       Comments: Cachectic, temporal wasting   HENT:      Head: Normocephalic and atraumatic.   Eyes:      Conjunctiva/sclera: Conjunctivae normal.      Pupils: Pupils are equal, round, and reactive to light.   Neck:      Comments: RIJ trialysis line. Do not appreciate elevated JVP  Cardiovascular:      Rate and Rhythm: Normal rate and regular rhythm.      Comments: Smooth VAD hum  Pulmonary:      Effort: Pulmonary effort is normal.      Breath sounds: Normal breath sounds.   Abdominal:      General: Bowel sounds are normal.      Palpations: Abdomen is soft.   Musculoskeletal:         General: No swelling. Normal range of motion.      Cervical back: Normal range of motion and neck supple.   Skin:     General: Skin is warm and dry.      Capillary Refill: Capillary refill takes 2 to 3 seconds.   Neurological:      General: No focal deficit present.      Mental Status: He is alert and oriented to person, place, and time.            Significant Labs:  CBC:  Recent Labs   Lab 12/20/23  0258 12/20/23 2049 12/21/23  0308 12/22/23  0410   WBC 15.22*  --  13.34* 13.20*   RBC 2.31*  --  2.17* 2.59*   HGB 7.1*  --  6.5* 7.5*   HCT 22.2* 21* 20.7* 22.8*     --  407 432   MCV 96  --  95 88   MCH 30.7  --  30.0 29.0   MCHC 32.0  --  31.4* 32.9     BNP:  Recent Labs   Lab 12/18/23 0417 12/20/23  0258 12/22/23  0410   BNP 1,484* 1,130* 1,628*     CMP:  Recent Labs   Lab 12/20/23  0731 12/21/23  0308 12/22/23  0410   * 177* 86   CALCIUM 8.1* 8.2* 8.0*   ALBUMIN 1.7* 1.7* 1.8*  1.8*   PROT 6.1 6.0 6.4  6.4    136 140   K 4.8 4.8 3.7   CO2 22* 19* 26    106 100   BUN 31* 51* 35*   CREATININE 2.0* 3.2* 2.6*    ALKPHOS 219* 206* 215*  215*   ALT 30 29 28  28   AST 61* 56* 50*  50*   BILITOT 0.8 0.6 0.7  0.7      Coagulation:   Recent Labs   Lab 12/18/23  0417 12/19/23  0419 12/20/23  0258 12/21/23  0352 12/22/23  0410   INR 1.9* 1.8* 2.0* 2.4* 2.9*   APTT 48.3* 46.2* 47.0*  --   --      LDH:  Recent Labs   Lab 12/20/23  0258 12/21/23  0308 12/22/23  0410   * 455* 485*     Microbiology:  Microbiology Results (last 7 days)       Procedure Component Value Units Date/Time    Fungus culture [[id number]] Collected: 12/06/23 1532    Order Status: Completed Specimen: Body Fluid from Lung, Right Updated: 12/20/23 0949     Fungus (Mycology) Culture Culture in progress      No fungus isolated after 2 weeks    Narrative:      Bronchial Wash    Blood culture [[id number]] Collected: 12/10/23 1240    Order Status: Completed Specimen: Blood from Peripheral, Antecubital, Right Updated: 12/15/23 2012     Blood Culture, Routine No growth after 5 days.    Blood culture [[id number]] Collected: 12/10/23 1149    Order Status: Completed Specimen: Blood from Line, Jugular, External Right Updated: 12/15/23 2012     Blood Culture, Routine No growth after 5 days.            I have reviewed all pertinent labs within the past 24 hours.    Estimated Creatinine Clearance: 32.7 mL/min (A) (based on SCr of 2.6 mg/dL (H)).    Diagnostic Results:  I have reviewed and interpreted all pertinent imaging results/findings within the past 24 hours.

## 2023-12-22 NOTE — NURSING
Patient taken to IR with RN and transport staff. Patient connect to batteries and Emergency bag sent with patient. RN to stay with patient as patient is not checked off on alarms.

## 2023-12-22 NOTE — SUBJECTIVE & OBJECTIVE
Interval History: HD yesterday, tolerated well. UF 1L. TDC placement today. Oxygenating well on RA.     Review of patient's allergies indicates:  No Known Allergies  Current Facility-Administered Medications   Medication Frequency    0.9%  NaCl infusion (for blood administration) Q24H PRN    0.9%  NaCl infusion Continuous    0.9%  NaCl infusion Continuous    [START ON 12/23/2023] 0.9%  NaCl infusion Once    acetaminophen tablet 1,000 mg TID    albuterol sulfate nebulizer solution 2.5 mg Q4H PRN    amiodarone tablet 400 mg Daily    balsam peru-castor oiL Oint BID    bisacodyL suppository 10 mg Daily PRN    dextrose 10 % infusion Continuous PRN    dextrose 10% bolus 125 mL 125 mL PRN    dextrose 10% bolus 250 mL 250 mL PRN    gabapentin 250 mg/5 mL (5 mL) solution 125 mg Q12H    glucagon (human recombinant) injection 1 mg PRN    insulin aspart U-100 pen 0-10 Units Q4H PRN    insulin aspart U-100 pen 5 Units 6 times per day    melatonin tablet 6 mg Nightly PRN    midodrine tablet 15 mg TID    mirtazapine tablet 15 mg Nightly    oxyCODONE immediate release tablet 5 mg Q6H PRN    pantoprazole injection 40 mg Daily    polyethylene glycol packet 17 g Daily    psyllium husk (aspartame) 3.4 gram packet 1 packet Daily    senna-docusate 8.6-50 mg per tablet 2 tablet Daily    sodium chloride 0.9% flush 10 mL PRN    venlafaxine tablet 37.5 mg Daily    [START ON 12/23/2023] warfarin (COUMADIN) tablet 5 mg Daily       Objective:     Vital Signs (Most Recent):  Temp: 98 °F (36.7 °C) (12/22/23 1448)  Pulse: 82 (12/22/23 1448)  Resp: 16 (12/22/23 1448)  BP: 98/72 (12/22/23 1448)  SpO2: 98 % (12/22/23 1448) Vital Signs (24h Range):  Temp:  [97.9 °F (36.6 °C)-98.7 °F (37.1 °C)] 98 °F (36.7 °C)  Pulse:  [30-96] 82  Resp:  [14-28] 16  SpO2:  [92 %-100 %] 98 %  BP: (72-99)/(0-79) 98/72  Arterial Line BP: ()/() 83/64     Weight:  (pt doesnt stand bed scale broken) (12/22/23 4080)  Body mass index is 24.55 kg/m².  Body surface  area is 2.04 meters squared.    I/O last 3 completed shifts:  In: 2284.5 [I.V.:214.5; Blood:350; Other:300; NG/GT:1420]  Out: 1815 [Drains:15; Other:1800]     Physical Exam  Vitals and nursing note reviewed.   Eyes:      Conjunctiva/sclera: Conjunctivae normal.   Cardiovascular:      Comments: LVAD   Pulmonary:      Effort: Pulmonary effort is normal.      Comments: RA  Abdominal:      Palpations: Abdomen is soft.   Musculoskeletal:         General: No swelling.      Right lower leg: No edema.      Left lower leg: No edema.   Skin:     General: Skin is warm.   Neurological:      Mental Status: He is alert and oriented to person, place, and time.   Psychiatric:         Mood and Affect: Mood normal.          Significant Labs:  CBC:   Recent Labs   Lab 12/22/23  0410   WBC 13.20*   RBC 2.59*   HGB 7.5*   HCT 22.8*      MCV 88   MCH 29.0   MCHC 32.9     CMP:   Recent Labs   Lab 12/22/23  0410   GLU 86   CALCIUM 8.0*   ALBUMIN 1.8*  1.8*   PROT 6.4  6.4      K 3.7   CO2 26      BUN 35*   CREATININE 2.6*   ALKPHOS 215*  215*   ALT 28  28   AST 50*  50*   BILITOT 0.7  0.7     All labs within the past 24 hours have been reviewed.

## 2023-12-22 NOTE — PLAN OF CARE
Plan of care discussed with patient and spouse. Patient free from falls/traumas/injuries, fall precautions in place. Continuing to encourage sternal precautions and IS. LVAD DP and numbers WNL, smooth LVAD hum. Patient has no complaints of pain. Discussed medications and care. Tube feeds held at midnight for tunneled line placement. Blood glucose monitored. CVP=7 this morning. Patient and spouse have no questions at this time. Will continue to monitor.       Problem: Adult Inpatient Plan of Care  Goal: Plan of Care Review  Outcome: Ongoing, Progressing  Flowsheets (Taken 12/22/2023 0431)  Plan of Care Reviewed With:   spouse   patient     Problem: Diabetes Comorbidity  Goal: Blood Glucose Level Within Targeted Range  Outcome: Ongoing, Progressing  Intervention: Monitor and Manage Glycemia  Flowsheets (Taken 12/22/2023 0431)  Glycemic Management: blood glucose monitored     Problem: Fall Injury Risk  Goal: Absence of Fall and Fall-Related Injury  Outcome: Ongoing, Progressing  Intervention: Promote Injury-Free Environment  Flowsheets (Taken 12/22/2023 0431)  Safety Promotion/Fall Prevention:   assistive device/personal item within reach   side rails raised x 2   nonskid shoes/socks when out of bed   family to remain at bedside   Fall Risk signage in place   Fall Risk reviewed with patient/family     Problem: Skin Injury Risk Increased  Goal: Skin Health and Integrity  Outcome: Ongoing, Progressing  Intervention: Optimize Skin Protection  Flowsheets (Taken 12/22/2023 0431)  Pressure Reduction Techniques: frequent weight shift encouraged     Problem: Impaired Wound Healing  Goal: Optimal Wound Healing  Outcome: Ongoing, Progressing

## 2023-12-22 NOTE — TRANSFER OF CARE
Anesthesia Transfer of Care Note    Patient: Radha Abbott    Procedure(s) Performed: * No procedures listed *    Patient location: PACU    Anesthesia Type: MAC    Transport from OR: Transported from OR on room air with adequate spontaneous ventilation    Post pain: adequate analgesia    Post assessment: no apparent anesthetic complications and tolerated procedure well    Post vital signs: stable    Level of consciousness: awake and alert    Nausea/Vomiting: no nausea/vomiting    Complications: none    Transfer of care protocol was followed      Last vitals: Visit Vitals  BP (!) 78/0   Pulse 86   Temp 36.6 °C (97.9 °F) (Axillary)   Resp 18   Ht 6' (1.829 m)   Wt 82.1 kg (181 lb)   SpO2 100%   BMI 24.55 kg/m²

## 2023-12-22 NOTE — PT/OT/SLP PROGRESS
Speech Language Pathology      Radha Abbott  MRN: [id number]    SLP attempted to see Patient for therapy. Patient not seen today secondary to Other (RN care) upon SLP attempt. Will follow-up to re-attempt 12/23/23.    12/21/2023

## 2023-12-22 NOTE — PT/OT/SLP PROGRESS
Physical Therapy Co-Treatment with OT    Patient Name:  Radha Abbott   MRN:  [id number]    Recommendations:     Discharge Recommendations: High Intensity Therapy  Discharge Equipment Recommendations: other (see comments) (bilateral platform rolling walker)  Barriers to discharge:  increased level of assistance required    Assessment:     Radha Abbott is a 63 y.o. male admitted with a medical diagnosis of LVAD (left ventricular assist device) present.  He presents with the following impairments/functional limitations: weakness, impaired functional mobility, decreased safety awareness, impaired endurance, gait instability, pain, impaired balance, decreased lower extremity function. Pt tolerated session fair today with ability to stand with bilateral platform rolling walker with improved posture and stability demonstrating improvements in functional mobility. Pt will continue to benefit from skilled PT 6x/week to progress physically. Pt will benefit from high intensity therapy when medically stable. Pt s/p LVAD placement 12/1/23 and closure 12/4/23    Rehab Prognosis: Good; patient would benefit from acute skilled PT services to address these deficits and reach maximum level of function.    Recent Surgery: Procedure(s) (LRB):  CLOSURE, WOUND, STERNUM (N/A)  INSERTION, GRAFT, PERICARDIUM  DRAINAGE, PLEURAL EFFUSION 18 Days Post-Op    Plan:     During this hospitalization, patient to be seen 6 x/week to address the identified rehab impairments via gait training, therapeutic activities and progress toward the following goals:    Plan of Care Expires:  01/04/24    Subjective     Chief Complaint: generalized weakness  Patient/Family Comments/goals: to get stronger  Pain/Comfort:  Pain Rating 1: 7/10  Location - Side 1: Right  Location 1: foot  Pain Addressed 1: Reposition, Distraction  Pain Rating Post-Intervention 1: 7/10      Objective:     Communicated with RN prior to session.  Patient found HOB elevated with NG tube,  telemetry, LVAD, PICC line, central line with wife and RN upon PT entry to room.     General Precautions: Standard, fall, LVAD, sternal  Orthopedic Precautions: N/A  Braces: N/A  Respiratory Status: Room air     Functional Mobility:  Bed Mobility:     Supine to Sit: moderate assistance  Transfers:     Sit to Stand:  maximal assistance and of 2 persons with bilateral platform rolling walker x2 reps from EOB, pt required verbal cues for standing up straight, extending hips, and maintaining upright head position, facilitation given at hips to achieve full upright position  Gait: pt able to take 1 step forward and 1 step back with maxA x2 using bilateral platform rolling walker, pt with decreased step height and length, heavy reliance on platform RW, decreased endurance, and weakness leading to instability but no LOB this session  Sitting Balance: pt sat EOB for 20 minutes with SBA performing LVAD training and switching to battery with OT  Standing Balance: pt stood for 2 trials of 2 minutes and 1.5 minutes with modA x2 to maintain upright position, pt with forward flexion at hips and trunk with fatigue but able to reposition with verbal cues from PT  Slide board transfer with drawsheet totalA x4 on full length body slide board    Due to pt complex medical condition, the skill of 2 licensed therapists is needed to maximize treatment session and progression towards goals  Pt white board updated with current therapists name and level of mobility assistance needed.     AM-PAC 6 CLICK MOBILITY  Turning over in bed (including adjusting bedclothes, sheets and blankets)?: 3  Sitting down on and standing up from a chair with arms (e.g., wheelchair, bedside commode, etc.): 2  Moving from lying on back to sitting on the side of the bed?: 2  Moving to and from a bed to a chair (including a wheelchair)?: 2  Need to walk in hospital room?: 1  Climbing 3-5 steps with a railing?: 1  Basic Mobility Total Score: 11       Treatment &  Education:  PT verbally educated pt and pt's wife on PT POC with both verbalizing understanding of such.     Patient left  on stretcher  with all lines intact and transport team present with emergency bag..    GOALS:   Multidisciplinary Problems       Physical Therapy Goals          Problem: Physical Therapy    Goal Priority Disciplines Outcome Goal Variances Interventions   Physical Therapy Goal     PT, PT/OT Ongoing, Progressing     Description: Goals to be met by: 23     Patient will increase functional independence with mobility by performin. Sit to stand transfer with modified independence  2. Bed to chair transfer with supervision  3. Gait  x 150 feet with supervision using physician approved AD with standing rest breaks prn.   4. Lower extremity exercise program x30 reps per handout, with independence  5. Recite 3/3 sternal precautions and remain complaint with precautions throughout session with no verbal cues                     Multidisciplinary Problems (Resolved)          Problem: Physical Therapy    Goal Priority Disciplines Outcome Goal Variances Interventions   Physical Therapy Goal   (Resolved)     PT, PT/OT Met     Description: Goals to be met by: 23     Patient will increase functional independence with mobility by performin. Evaluate pt's need for physical therapy.                          Time Tracking:     PT Received On: 23  PT Start Time: 0755     PT Stop Time: 08  PT Total Time (min): 34 min     Billable Minutes: Gait Training 12 minutes and Neuromuscular Re-education 22 minutes    Treatment Type: Treatment  PT/PTA: PT     Number of PTA visits since last PT visit: 0     2023

## 2023-12-22 NOTE — PROGRESS NOTES
12/22/23 1145   Vital Signs   Pulse 86   Resp 17   BP 97/79   MAP (mmHg) 84     Pt transported by nurse with all LVAD equipment back to room 312. Family in room with receiving nurse.

## 2023-12-22 NOTE — PLAN OF CARE
Pt arrived to IR room 188 via stretcher w/ RN x2, sCRNA, & CRNA. AAO x4. Name/sob/allergies/procedure verified. Pt will be monitored by RN & CRNA @ bedside throughout procedure/sedation. Sedation/airway/vs per anesthesia team.LVAD certified RN @ bedside

## 2023-12-22 NOTE — PROGRESS NOTES
12/22/2023  Monet Aguiar    Current provider:  Juventino Bermudez Jr.*    Device interrogation:      12/22/2023    11:12 AM 12/22/2023     8:40 AM 12/22/2023     8:02 AM 12/22/2023     3:51 AM 12/22/2023    12:04 AM 12/21/2023    10:24 PM 12/21/2023     9:00 PM   TXP LVAD INTERROGATIONS   Type HeartMate3 HeartMate3 HeartMate3 HeartMate3 HeartMate3 HeartMate3 HeartMate3   Flow 3.6 4 3.7 3.8 4 4.1 4.2   Speed 4950 4900 4900 4900 4900 4900 4900   PI 6.3 4.7 5.5 5.7 4.4 3.7 3.3   Power ([place]) 3.4 3.4 3.3 3.3 3.2 3.3 3.3   LSL  4500 4500 4500 4500 4500 4500   Pulsatility No Pulse  No Pulse  No Pulse No Pulse Intermittent pulse          Rounded on [facility] to ensure all mechanical assist device settings (IABP or VAD) were appropriate and all parameters were within limits.  I was able to ensure all back up equipment was present, the staff had no issues, and the Perfusion Department daily rounding was complete.      For implantable VADs: Interrogation of Ventricular assist device was performed with analysis of device parameters and review of device function. I have personally reviewed the interrogation findings and agree with findings as stated.     In emergency, the nursing units have been notified to contact the perfusion department either by:  Calling [phone] from 630am to 4pm Mon thru Fri, utilizing the On-Call Finder functionality of Epic and searching for Perfusion, or by contacting the hospital  from 4pm to 630am and on weekends and asking to speak with the perfusionist on call.    12:18 PM

## 2023-12-22 NOTE — PROGRESS NOTES
Patient was seen today in the hospital. Patient awake at time of visit. No family at bedside. Patient educated on VAD equipment. MPU, UBC, batteries, and emergency bag were all covered. Patient is progressing well with his overall understanding of the VAD equipment. Will come by early next week for more education.

## 2023-12-22 NOTE — H&P
Inpatient Radiology Pre-procedure H&P    History of Present Illness:  Radha Abbott is a 63 y.o. male with ICM (LVEF 10-15% & G3 DD), CAD (s/p 3 vessel CABG 2009), DM II, HTN, HLD, h/o v fib and CKD3 who was admitted on 11/9 for CHF exacerbation. Patient was initially managed with dobutamine and diuresis, however ultimately underwent intra-aortic balloon pump placement on 12/1. Nephrology started the patient on CRRT on 12/5 to assist with volume removal. Patient has been anuric since then, with IVÁN suspected to be secondary to ATN 2/2 hypotension/cardiogenic shock compounded by intra-arterial contrast and anemia.      Interventional Radiology has been consulted for tunneled HD catheter placement for  long term dialysis. Patient stepped out of ICU overnight. Seen this am on CSU and no complaints currently. Discussed with patient and wife at bedside, plan to proceed today with tunneled HD line.   .  Admission H&P reviewed.  Past Medical History:   Diagnosis Date    CAD (coronary artery disease)     Diabetes mellitus     HFrEF (heart failure with reduced ejection fraction)     ICD (implantable cardioverter-defibrillator) in place     MI, old      Past Surgical History:   Procedure Laterality Date    ANGIOPLASTY-VENOUS ARTERY Right 12/1/2023    Procedure: ANGIOPLASTY-VENOUS ARTERY, RIGHT FEMORAL;  Surgeon: Yuri Washington MD;  Location: [facility] OR [address] [place] [address];  Service: Cardiovascular;  Laterality: Right;    AORTIC VALVULOPLASTY N/A 12/1/2023    Procedure: REPAIR, AORTIC VALVE;  Surgeon: Yuri Washington MD;  Location: [facility] OR [address] [place] [address];  Service: Cardiovascular;  Laterality: N/A;    CARDIAC SURGERY      CLOSURE N/A 12/1/2023    Procedure: CLOSURE, TEMPORARY;  Surgeon: Yuri Washington MD;  [facility];  Service: Cardiovascular;  Laterality: N/A;    DRAINAGE OF PLEURAL EFFUSION  12/4/2023    Procedure: DRAINAGE, PLEURAL EFFUSION;  Surgeon: Yuri Washington MD;  Location: [facility] OR [address] [place] [address];  Service: Cardiovascular;;     INSERTION OF GRAFT TO PERICARDIUM  12/4/2023    Procedure: INSERTION, GRAFT, PERICARDIUM;  Surgeon: Yuri Washington MD;  [facility];  Service: Cardiovascular;;    INSERTION OF INTRA-AORTIC BALLOON ASSIST DEVICE Right 11/21/2023    Procedure: INSERTION, INTRA-AORTIC BALLOON PUMP;  Surgeon: Finn Cohn MD;  Location: [facility] CATH LAB;  Service: Cardiology;  Laterality: Right;    LEFT VENTRICULAR ASSIST DEVICE Left 12/1/2023    Procedure: INSERTION-LEFT VENTRICULAR ASSIST DEVICE;  Surgeon: Yuri Washington MD;  Location: [facility] OR [employer] [facility];  Service: Cardiovascular;  Laterality: Left;  REDO STERNOTOMY - REDO SAW NEEDED FOR CASE    LYSIS OF ADHESIONS  12/1/2023    Procedure: LYSIS, ADHESIONS;  Surgeon: Yuri Washington MD;  [facility];  Service: Cardiovascular;;    PLACEMENT OF SWAN ROLANDO CATHETER WITH IMAGING GUIDANCE  11/20/2023    Procedure: INSERTION, CATHETER, SWAN-ROLANDO, WITH IMAGING GUIDANCE;  Surgeon: Sajan Hurley MD;  Location: [facility] CATH LAB;  Service: Cardiology;;    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 12/1/2023    Procedure: REPAIR, ANEURYSM, ARTERY, FEMORAL;  Surgeon: Yuri Washington MD;  [facility];  Service: Cardiovascular;  Laterality: Right;  Right Femoral Artery Repair    RIGHT HEART CATHETERIZATION Right 10/10/2023    Procedure: INSERTION, CATHETER, RIGHT HEART;  Surgeon: Bin Gandhi MD;  [facility]: [facility];  Service: Cardiology;  Laterality: Right;    RIGHT HEART CATHETERIZATION Right 10/13/2023    Procedure: INSERTION, CATHETER, RIGHT HEART;  Surgeon: Walter Mcintyre MD;  Location: [facility] CATH LAB;  Service: Cardiology;  Laterality: Right;    RIGHT HEART CATHETERIZATION  11/13/2023    RIGHT HEART CATHETERIZATION Right 11/13/2023    Procedure: INSERTION, CATHETER, RIGHT HEART;  Surgeon: Juventino Bermudez Jr., MD;  Location: [facility] CATH LAB;  Service: Cardiology;  Laterality: Right;    RIGHT HEART CATHETERIZATION Right 11/20/2023     Procedure: INSERTION, CATHETER, RIGHT HEART;  Surgeon: Sajna Hurley MD;  Location: [facility] CATH LAB;  Service: Cardiology;  Laterality: Right;    STERNAL WOUND CLOSURE N/A 12/4/2023    Procedure: CLOSURE, WOUND, STERNUM;  Surgeon: Yuri Washington MD;  [facility];  Service: Cardiovascular;  Laterality: N/A;    STERNOTOMY N/A 12/1/2023    Procedure: STERNOTOMY, REDO;  Surgeon: Yuri Washington MD;  [facility];  Service: Cardiovascular;  Laterality: N/A;    VALVULOPLASTY, MITRAL VALVE N/A 12/1/2023    Procedure: VALVULOPLASTY, MITRAL VALVE;  Surgeon: Yuri Washington MD;  [facility];  Service: Cardiovascular;  Laterality: N/A;       Review of Systems:   Constitutional:  Negative for fever.        Generalized weakness secondary to prolonged hospitalization   Respiratory:  Negative for shortness of breath.    Cardiovascular:  Negative for chest pain.   Gastrointestinal:  Negative for nausea and vomiting.     Home Meds:   Prior to Admission medications    Medication Sig Start Date End Date Taking? Authorizing Provider   amiodarone (PACERONE) 400 MG tablet Take 1 tablet (400 mg total) by mouth once daily. 10/22/23 10/21/24 Yes Jimy An PA-C   apixaban (ELIQUIS) 5 mg Tab Take 1 tablet (5 mg total) by mouth 2 (two) times daily. 10/16/23  Yes Jimy An PA-C   aspirin (ECOTRIN) 81 MG EC tablet Take 81 mg by mouth once daily.   Yes Provider, Historical   atorvastatin (LIPITOR) 40 MG tablet Take 40 mg by mouth. 9/28/23  Yes Provider, Historical   DOBUTamine (DOBUTREX) 1,000 mg/250 mL (4,000 mcg/mL) infusion Inject 389.5 mcg/min into the vein continuous. 10/20/23  Yes Jimy An PA-C   fish oil-omega-3 fatty acids 300-1,000 mg capsule Take 2 g by mouth once daily.   Yes Provider, Historical   furosemide (LASIX) 40 MG tablet Take 1 tablet (40 mg total) by mouth 2 (two) times daily. 10/31/23 10/30/24 Yes Nic Mares MD   gabapentin (NEURONTIN) 300 MG capsule  Take 300 mg by mouth nightly as needed. 9/23/23  Yes Provider, Historical   multivitamin capsule Take 1 capsule by mouth once daily.   Yes Provider, Historical   pantoprazole (PROTONIX) 40 MG tablet Take 1 tablet (40 mg total) by mouth before breakfast. 11/2/23 11/1/24 Yes Samra López MD   potassium chloride SA (K-DUR,KLOR-CON) 20 MEQ tablet Take 1 tablet (20 mEq total) by mouth once daily. 10/23/23  Yes Jimy An PA-C   sacubitriL-valsartan (ENTRESTO) 24-26 mg per tablet Take 1 tablet by mouth 2 (two) times daily. 10/31/23  Yes Nic Mares MD   metOLazone (ZAROXOLYN) 2.5 MG tablet Take 1 tablet (2.5 mg total) by mouth once daily. Thursday 11/9 and Saturday 11/10 11/8/23 11/7/24  Walter Mcintyre MD   torsemide (DEMADEX) 20 MG Tab Take 2 tablets (40 mg total) by mouth 2 (two) times a day. 11/8/23 11/7/24  Walter Mcintyre MD     Scheduled Meds:    acetaminophen  1,000 mg Per NG tube TID    amiodarone  400 mg Per NG tube Daily    balsam peru-castor oiL   Topical (Top) BID    calcium gluconate IVPB  1 g Intravenous Once    gabapentin  125 mg Per NG tube Q12H    insulin aspart U-100  5 Units Subcutaneous 6 times per day    midodrine  15 mg Per NG tube TID    mirtazapine  15 mg Per NG tube Nightly    pantoprazole  40 mg Intravenous Daily    polyethylene glycol  17 g Per NG tube Daily    psyllium husk (aspartame)  1 packet Per NG tube Daily    senna-docusate 8.6-50 mg  2 tablet Per NG tube Daily    tuberculin  5 Units Intradermal Once    venlafaxine  37.5 mg Per G Tube Daily    warfarin  4 mg Per G Tube Daily     Continuous Infusions:    sodium chloride 0.9% Stopped (12/17/23 2300)    sodium chloride 0.9%      dextrose 10 % in water (D10W)       PRN Meds:0.9%  NaCl infusion (for blood administration), albuterol sulfate, bisacodyL, dextrose 10 % in water (D10W), dextrose 10%, dextrose 10%, glucagon (human recombinant), insulin aspart U-100, melatonin, oxyCODONE, QUEtiapine, Flushing PICC/Midline Protocol  **AND** [DISCONTINUED] sodium chloride 0.9% **AND** sodium chloride 0.9%  Anticoagulants/Antiplatelets: Coumadin    Allergies: Review of patient's allergies indicates:  No Known Allergies  Sedation Hx: have not been any systemic reactions    Labs:  Recent Labs   Lab 12/22/23 0410   INR 2.9*       Recent Labs   Lab 12/22/23 0410   WBC 13.20*   HGB 7.5*   HCT 22.8*   MCV 88         Recent Labs   Lab 12/22/23 0410   GLU 86      K 3.7      CO2 26   BUN 35*   CREATININE 2.6*   CALCIUM 8.0*   MG 2.0   ALT 28  28   AST 50*  50*   ALBUMIN 1.8*  1.8*   BILITOT 0.7  0.7   BILIDIR 0.3         Vitals:  Temp: 98 °F (36.7 °C) (12/22/23 0359)  Pulse: 86 (12/22/23 0606)  Resp: 16 (12/22/23 0359)  BP: (!) 78/0 (12/22/23 0359)  SpO2: (!) 92 % (12/22/23 0359)     Physical Exam:  ASA: 3  Mallampati: 3    General: no acute distress  Mental Status: alert and oriented to person, place and time  HEENT: normocephalic, atraumatic  Chest: unlabored breathing  Heart: regular heart rate. LVAD present  Abdomen: distended  Extremity: moves all extremities    Plan: Tunneled HD line placement  Sedation Plan: up to moderate anesthesia  Has been NPO since midnight  OK to continue coumadin; INR needs to be < 3 (2.9 on am labs today)    Adamaris Paige NP  Interventional Radiology

## 2023-12-22 NOTE — PLAN OF CARE
Problem: Adult Inpatient Plan of Care  Goal: Plan of Care Review  Outcome: Ongoing, Progressing  Goal: Patient-Specific Goal (Individualized)  Outcome: Ongoing, Progressing  Goal: Absence of Hospital-Acquired Illness or Injury  Outcome: Ongoing, Progressing  Goal: Optimal Comfort and Wellbeing  Outcome: Ongoing, Progressing  Goal: Readiness for Transition of Care  Outcome: Ongoing, Progressing     Problem: Diabetes Comorbidity  Goal: Blood Glucose Level Within Targeted Range  Outcome: Ongoing, Progressing  Intervention: Monitor and Manage Glycemia  Flowsheets (Taken 12/22/2023 1643)  Glycemic Management:   blood glucose monitored   supplemental insulin given     Problem: Fluid and Electrolyte Imbalance (Acute Kidney Injury/Impairment)  Goal: Fluid and Electrolyte Balance  Outcome: Ongoing, Progressing  Intervention: Monitor and Manage Fluid and Electrolyte Balance  Flowsheets (Taken 12/22/2023 1643)  Fluid/Electrolyte Management: electrolyte supplement adjusted     Problem: Oral Intake Inadequate (Acute Kidney Injury/Impairment)  Goal: Optimal Nutrition Intake  Outcome: Ongoing, Progressing     Problem: Renal Function Impairment (Acute Kidney Injury/Impairment)  Goal: Effective Renal Function  Outcome: Ongoing, Progressing  Intervention: Monitor and Support Renal Function  Flowsheets (Taken 12/22/2023 1643)  Stabilization Measures: (dialysis) other (see comments)     Problem: Infection  Goal: Absence of Infection Signs and Symptoms  Outcome: Ongoing, Progressing     Problem: Adjustment to Illness (Heart Failure)  Goal: Optimal Coping  Outcome: Ongoing, Progressing  Intervention: Support Psychosocial Response  Flowsheets (Taken 12/22/2023 1643)  Supportive Measures: positive reinforcement provided  Family/Support System Care: caregiver stress acknowledged     Problem: Cardiac Output Decreased (Heart Failure)  Goal: Optimal Cardiac Output  Outcome: Ongoing, Progressing     Problem: Dysrhythmia (Heart Failure)  Goal:  Stable Heart Rate and Rhythm  Outcome: Ongoing, Progressing     Problem: Fluid Imbalance (Heart Failure)  Goal: Fluid Balance  Outcome: Ongoing, Progressing     Problem: Functional Ability Impaired (Heart Failure)  Goal: Optimal Functional Ability  Outcome: Ongoing, Progressing     Problem: Oral Intake Inadequate (Heart Failure)  Goal: Optimal Nutrition Intake  Outcome: Ongoing, Progressing     Problem: Respiratory Compromise (Heart Failure)  Goal: Effective Oxygenation and Ventilation  Outcome: Ongoing, Progressing     Problem: Sleep Disordered Breathing (Heart Failure)  Goal: Effective Breathing Pattern During Sleep  Outcome: Ongoing, Progressing     Problem: Cardiac Output Decreased  Goal: Effective Cardiac Output  Outcome: Ongoing, Progressing     Problem: Fall Injury Risk  Goal: Absence of Fall and Fall-Related Injury  Outcome: Ongoing, Progressing     Problem: Coping Ineffective  Goal: Effective Coping  Outcome: Ongoing, Progressing     Problem: Skin Injury Risk Increased  Goal: Skin Health and Integrity  Outcome: Ongoing, Progressing     Problem: Adjustment to Device (Ventricular Assist Device)  Goal: Optimal Adjustment to Device  Outcome: Ongoing, Progressing     Problem: Bleeding (Ventricular Assist Device)  Goal: Absence of Bleeding  Outcome: Ongoing, Progressing     Problem: Embolism (Ventricular Assist Device)  Goal: Absence of Embolism Signs and Symptoms  Outcome: Ongoing, Progressing     Problem: Hemodynamic Instability (Ventricular Assist Device)  Goal: Optimal Blood Flow  Outcome: Ongoing, Progressing     Problem: Infection (Ventricular Assist Device)  Goal: Absence of Infection Signs and Symptoms  Outcome: Ongoing, Progressing     Problem: Right-Sided Heart Compromise (Ventricular Assist Device)  Goal: Effective Right-Sided Heart Function  Outcome: Ongoing, Progressing     Problem: Device-Related Complication Risk (CRRT (Continuous Renal Replacement Therapy))  Goal: Safe, Effective Therapy  Delivery  Outcome: Ongoing, Progressing     Problem: Hypothermia (CRRT (Continuous Renal Replacement Therapy))  Goal: Body Temperature Maintained in Desired Range  Outcome: Ongoing, Progressing     Problem: Infection (CRRT (Continuous Renal Replacement Therapy))  Goal: Absence of Infection Signs and Symptoms  Outcome: Ongoing, Progressing     Problem: Skin and Tissue Injury (Artificial Airway)  Goal: Absence of Device-Related Skin or Tissue Injury  Outcome: Ongoing, Progressing     Problem: Adjustment to Illness (Sepsis/Septic Shock)  Goal: Optimal Coping  Outcome: Ongoing, Progressing     Problem: Bleeding (Sepsis/Septic Shock)  Goal: Absence of Bleeding  Outcome: Ongoing, Progressing     Problem: Glycemic Control Impaired (Sepsis/Septic Shock)  Goal: Blood Glucose Level Within Desired Range  Outcome: Ongoing, Progressing     Problem: Infection Progression (Sepsis/Septic Shock)  Goal: Absence of Infection Signs and Symptoms  Outcome: Ongoing, Progressing     Problem: Nutrition Impaired (Sepsis/Septic Shock)  Goal: Optimal Nutrition Intake  Outcome: Ongoing, Progressing

## 2023-12-22 NOTE — PROCEDURES
VIR procedure note      Pre Op Diagnosis: ESRD  Post Op Diagnosis: Same    Procedure: Tunneled HD cath placement    Procedure performed by:  Bobby Rowe MD / Bashir Cervantes MD    Written Informed Consent Obtained: Yes  Specimen Removed: NO  Estimated Blood Loss: Minimal    Findings:   Successful placement of R 15.5 Fr tunneled HD catheter with the tip in the right atrium    Patient tolerated procedure well.     Recommendations:    Catheter can be used immediately.    Bobby Rowe MD  PGY-5 Radiology Resident

## 2023-12-23 LAB
ALBUMIN SERPL BCP-MCNC: 1.9 G/DL (ref 3.5–5.2)
ALP SERPL-CCNC: 215 U/L (ref 55–135)
ALT SERPL W/O P-5'-P-CCNC: 18 U/L (ref 10–44)
ANION GAP SERPL CALC-SCNC: 14 MMOL/L (ref 8–16)
AST SERPL-CCNC: 48 U/L (ref 10–40)
BASOPHILS # BLD AUTO: 0.05 K/UL (ref 0–0.2)
BASOPHILS NFR BLD: 0.4 % (ref 0–1.9)
BILIRUB DIRECT SERPL-MCNC: 0.3 MG/DL (ref 0.1–0.3)
BILIRUB SERPL-MCNC: 0.5 MG/DL (ref 0.1–1)
BUN SERPL-MCNC: 57 MG/DL (ref 8–23)
CA-I BLDV-SCNC: 1.08 MMOL/L (ref 1.06–1.42)
CA-I BLDV-SCNC: 1.22 MMOL/L (ref 1.06–1.42)
CALCIUM SERPL-MCNC: 8.3 MG/DL (ref 8.7–10.5)
CHLORIDE SERPL-SCNC: 100 MMOL/L (ref 95–110)
CO2 SERPL-SCNC: 26 MMOL/L (ref 23–29)
CREAT SERPL-MCNC: 4.3 MG/DL (ref 0.5–1.4)
DIFFERENTIAL METHOD BLD: ABNORMAL
EOSINOPHIL # BLD AUTO: 0.1 K/UL (ref 0–0.5)
EOSINOPHIL NFR BLD: 0.8 % (ref 0–8)
ERYTHROCYTE [DISTWIDTH] IN BLOOD BY AUTOMATED COUNT: 20.5 % (ref 11.5–14.5)
EST. GFR  (NO RACE VARIABLE): 14.9 ML/MIN/1.73 M^2
GLUCOSE SERPL-MCNC: 105 MG/DL (ref 70–110)
HCT VFR BLD AUTO: 24.3 % (ref 40–54)
HGB BLD-MCNC: 7.6 G/DL (ref 14–18)
IMM GRANULOCYTES # BLD AUTO: 0.06 K/UL (ref 0–0.04)
IMM GRANULOCYTES NFR BLD AUTO: 0.5 % (ref 0–0.5)
INR PPP: 2.9 (ref 0.8–1.2)
LDH SERPL L TO P-CCNC: 451 U/L (ref 110–260)
LYMPHOCYTES # BLD AUTO: 0.8 K/UL (ref 1–4.8)
LYMPHOCYTES NFR BLD: 6.5 % (ref 18–48)
MAGNESIUM SERPL-MCNC: 2.2 MG/DL (ref 1.6–2.6)
MCH RBC QN AUTO: 29 PG (ref 27–31)
MCHC RBC AUTO-ENTMCNC: 31.3 G/DL (ref 32–36)
MCV RBC AUTO: 93 FL (ref 82–98)
MONOCYTES # BLD AUTO: 0.7 K/UL (ref 0.3–1)
MONOCYTES NFR BLD: 6.1 % (ref 4–15)
NEUTROPHILS # BLD AUTO: 9.9 K/UL (ref 1.8–7.7)
NEUTROPHILS NFR BLD: 85.7 % (ref 38–73)
NRBC BLD-RTO: 0 /100 WBC
PHOSPHATE SERPL-MCNC: 4.6 MG/DL (ref 2.7–4.5)
PLATELET # BLD AUTO: 457 K/UL (ref 150–450)
PMV BLD AUTO: 10.9 FL (ref 9.2–12.9)
POCT GLUCOSE: 128 MG/DL (ref 70–110)
POCT GLUCOSE: 139 MG/DL (ref 70–110)
POCT GLUCOSE: 152 MG/DL (ref 70–110)
POCT GLUCOSE: 181 MG/DL (ref 70–110)
POCT GLUCOSE: 184 MG/DL (ref 70–110)
POCT GLUCOSE: 200 MG/DL (ref 70–110)
POTASSIUM SERPL-SCNC: 3.8 MMOL/L (ref 3.5–5.1)
PREALB SERPL-MCNC: 15 MG/DL (ref 20–43)
PROT SERPL-MCNC: 6.6 G/DL (ref 6–8.4)
PROTHROMBIN TIME: 29 SEC (ref 9–12.5)
RBC # BLD AUTO: 2.62 M/UL (ref 4.6–6.2)
SODIUM SERPL-SCNC: 140 MMOL/L (ref 136–145)
WBC # BLD AUTO: 11.56 K/UL (ref 3.9–12.7)

## 2023-12-23 PROCEDURE — 80076 HEPATIC FUNCTION PANEL: CPT | Performed by: INTERNAL MEDICINE

## 2023-12-23 PROCEDURE — 20600001 HC STEP DOWN PRIVATE ROOM

## 2023-12-23 PROCEDURE — 84100 ASSAY OF PHOSPHORUS: CPT | Performed by: INTERNAL MEDICINE

## 2023-12-23 PROCEDURE — 84134 ASSAY OF PREALBUMIN: CPT | Performed by: STUDENT IN AN ORGANIZED HEALTH CARE EDUCATION/TRAINING PROGRAM

## 2023-12-23 PROCEDURE — 27000248 HC VAD-ADDITIONAL DAY

## 2023-12-23 PROCEDURE — 85610 PROTHROMBIN TIME: CPT | Performed by: INTERNAL MEDICINE

## 2023-12-23 PROCEDURE — 25000003 PHARM REV CODE 250: Performed by: NURSE PRACTITIONER

## 2023-12-23 PROCEDURE — 25000003 PHARM REV CODE 250

## 2023-12-23 PROCEDURE — 25000003 PHARM REV CODE 250: Performed by: STUDENT IN AN ORGANIZED HEALTH CARE EDUCATION/TRAINING PROGRAM

## 2023-12-23 PROCEDURE — 99233 SBSQ HOSP IP/OBS HIGH 50: CPT | Mod: ,,, | Performed by: NURSE PRACTITIONER

## 2023-12-23 PROCEDURE — 83735 ASSAY OF MAGNESIUM: CPT | Performed by: INTERNAL MEDICINE

## 2023-12-23 PROCEDURE — 25000003 PHARM REV CODE 250: Performed by: INTERNAL MEDICINE

## 2023-12-23 PROCEDURE — C9113 INJ PANTOPRAZOLE SODIUM, VIA: HCPCS | Performed by: PHYSICIAN ASSISTANT

## 2023-12-23 PROCEDURE — 80100014 HC HEMODIALYSIS 1:1

## 2023-12-23 PROCEDURE — 63600175 PHARM REV CODE 636 W HCPCS: Performed by: PHYSICIAN ASSISTANT

## 2023-12-23 PROCEDURE — 82330 ASSAY OF CALCIUM: CPT | Performed by: STUDENT IN AN ORGANIZED HEALTH CARE EDUCATION/TRAINING PROGRAM

## 2023-12-23 PROCEDURE — 83615 LACTATE (LD) (LDH) ENZYME: CPT | Performed by: STUDENT IN AN ORGANIZED HEALTH CARE EDUCATION/TRAINING PROGRAM

## 2023-12-23 PROCEDURE — 25000003 PHARM REV CODE 250: Performed by: PHYSICIAN ASSISTANT

## 2023-12-23 PROCEDURE — 99232 SBSQ HOSP IP/OBS MODERATE 35: CPT | Mod: ,,,

## 2023-12-23 PROCEDURE — 85025 COMPLETE CBC W/AUTO DIFF WBC: CPT | Performed by: INTERNAL MEDICINE

## 2023-12-23 PROCEDURE — 82330 ASSAY OF CALCIUM: CPT | Mod: 91 | Performed by: STUDENT IN AN ORGANIZED HEALTH CARE EDUCATION/TRAINING PROGRAM

## 2023-12-23 PROCEDURE — 93750 INTERROGATION VAD IN PERSON: CPT | Mod: ,,, | Performed by: INTERNAL MEDICINE

## 2023-12-23 PROCEDURE — 80048 BASIC METABOLIC PNL TOTAL CA: CPT | Performed by: NURSE PRACTITIONER

## 2023-12-23 RX ORDER — TRAMADOL HYDROCHLORIDE 50 MG/1
50 TABLET ORAL ONCE
Status: COMPLETED | OUTPATIENT
Start: 2023-12-23 | End: 2023-12-23

## 2023-12-23 RX ADMIN — GABAPENTIN 125 MG: 250 SOLUTION ORAL at 09:12

## 2023-12-23 RX ADMIN — MIRTAZAPINE 15 MG: 15 TABLET, FILM COATED ORAL at 08:12

## 2023-12-23 RX ADMIN — Medication: at 08:12

## 2023-12-23 RX ADMIN — WARFARIN SODIUM 5 MG: 5 TABLET ORAL at 05:12

## 2023-12-23 RX ADMIN — INSULIN ASPART 5 UNITS: 100 INJECTION, SOLUTION INTRAVENOUS; SUBCUTANEOUS at 08:12

## 2023-12-23 RX ADMIN — INSULIN ASPART 4 UNITS: 100 INJECTION, SOLUTION INTRAVENOUS; SUBCUTANEOUS at 12:12

## 2023-12-23 RX ADMIN — MIDODRINE HYDROCHLORIDE 15 MG: 5 TABLET ORAL at 08:12

## 2023-12-23 RX ADMIN — ACETAMINOPHEN 1000 MG: 500 TABLET ORAL at 03:12

## 2023-12-23 RX ADMIN — INSULIN ASPART 5 UNITS: 100 INJECTION, SOLUTION INTRAVENOUS; SUBCUTANEOUS at 05:12

## 2023-12-23 RX ADMIN — TRAMADOL HYDROCHLORIDE 50 MG: 50 TABLET, COATED ORAL at 06:12

## 2023-12-23 RX ADMIN — MIDODRINE HYDROCHLORIDE 15 MG: 5 TABLET ORAL at 09:12

## 2023-12-23 RX ADMIN — INSULIN ASPART 5 UNITS: 100 INJECTION, SOLUTION INTRAVENOUS; SUBCUTANEOUS at 01:12

## 2023-12-23 RX ADMIN — GABAPENTIN 125 MG: 250 SOLUTION ORAL at 08:12

## 2023-12-23 RX ADMIN — INSULIN ASPART 5 UNITS: 100 INJECTION, SOLUTION INTRAVENOUS; SUBCUTANEOUS at 09:12

## 2023-12-23 RX ADMIN — PANTOPRAZOLE SODIUM 40 MG: 40 INJECTION, POWDER, FOR SOLUTION INTRAVENOUS at 09:12

## 2023-12-23 RX ADMIN — INSULIN ASPART 2 UNITS: 100 INJECTION, SOLUTION INTRAVENOUS; SUBCUTANEOUS at 01:12

## 2023-12-23 RX ADMIN — VENLAFAXINE 37.5 MG: 37.5 TABLET ORAL at 09:12

## 2023-12-23 RX ADMIN — ACETAMINOPHEN 1000 MG: 500 TABLET ORAL at 08:12

## 2023-12-23 RX ADMIN — INSULIN ASPART 5 UNITS: 100 INJECTION, SOLUTION INTRAVENOUS; SUBCUTANEOUS at 04:12

## 2023-12-23 RX ADMIN — SODIUM CHLORIDE: 9 INJECTION, SOLUTION INTRAVENOUS at 01:12

## 2023-12-23 RX ADMIN — MIDODRINE HYDROCHLORIDE 15 MG: 5 TABLET ORAL at 03:12

## 2023-12-23 RX ADMIN — ACETAMINOPHEN 1000 MG: 500 TABLET ORAL at 09:12

## 2023-12-23 RX ADMIN — INSULIN ASPART 5 UNITS: 100 INJECTION, SOLUTION INTRAVENOUS; SUBCUTANEOUS at 12:12

## 2023-12-23 RX ADMIN — INSULIN ASPART 2 UNITS: 100 INJECTION, SOLUTION INTRAVENOUS; SUBCUTANEOUS at 04:12

## 2023-12-23 RX ADMIN — Medication: at 09:12

## 2023-12-23 RX ADMIN — AMIODARONE HYDROCHLORIDE 400 MG: 200 TABLET ORAL at 09:12

## 2023-12-23 NOTE — ASSESSMENT & PLAN NOTE
BG goal 140-180.      - Novolog 5 units with scheduled TF. (Hold if TF is stopped or if BG < 100 mg/dL);   - BG checks q4hr while on TF/NPO   - Novolog (aspart) insulin prn for BG excursions [facility] (150/25)   - Hypoglycemia protocol in place    ** Please notify Endocrine for any change and/or advance in diet**  ** Please call Endocrine for any BG related issues **    Discharge Planning:   TBD. Please notify endocrinology prior to discharge.

## 2023-12-23 NOTE — PROGRESS NOTES
[facility] - Cardiology Stepdown  Heart Transplant  Progress Note    Patient Name: Radha Abbott  MRN: [id number]  Admission Date: 11/9/2023  Hospital Length of Stay: 44 days  Attending Physician: Juventino Bermudez Jr.*  Primary Care Provider: Vasu Kong MD  Principal Problem:LVAD (left ventricular assist device) present    Subjective:   Interval History: No complaints or overnight events. S/P tunneled HD cath by IR yesterday, then RIJ TLC was D/C'd. CVP 7, remains anuric. Do not have chemistries back yet but plan for HD today with UF goal 1-2L. WCC continues to trend down, afebrile. Progressing slowly with PT/OT. Tolerating tube feeds at goal but remains NPO    Continuous Infusions:   sodium chloride 0.9% 0 mL/hr (12/17/23 2300)    sodium chloride 0.9%      dextrose 10 % in water (D10W)       Scheduled Meds:   sodium chloride 0.9%   Intravenous Once    acetaminophen  1,000 mg Per NG tube TID    amiodarone  400 mg Per NG tube Daily    balsam peru-castor oiL   Topical (Top) BID    gabapentin  125 mg Per NG tube Q12H    insulin aspart U-100  5 Units Subcutaneous 6 times per day    midodrine  15 mg Per NG tube TID    mirtazapine  15 mg Per NG tube Nightly    pantoprazole  40 mg Intravenous Daily    polyethylene glycol  17 g Per NG tube Daily    psyllium husk (aspartame)  1 packet Per NG tube Daily    senna-docusate 8.6-50 mg  2 tablet Per NG tube Daily    venlafaxine  37.5 mg Per G Tube Daily    warfarin  5 mg Per G Tube Daily     PRN Meds:0.9%  NaCl infusion (for blood administration), albuterol sulfate, bisacodyL, dextrose 10 % in water (D10W), dextrose 10%, dextrose 10%, glucagon (human recombinant), insulin aspart U-100, melatonin, oxyCODONE, Flushing PICC/Midline Protocol **AND** [DISCONTINUED] sodium chloride 0.9% **AND** sodium chloride 0.9%    Review of patient's allergies indicates:  No Known Allergies  Objective:     Vital Signs (Most Recent):  Temp: 97.5 °F (36.4 °C) (12/23/23 0730)  Pulse: 88  (12/23/23 0730)  Resp: 18 (12/23/23 0730)  BP: (!) 76/0 (12/23/23 0756)  SpO2: 99 % (12/23/23 0730) Vital Signs (24h Range):  Temp:  [97.3 °F (36.3 °C)-98.2 °F (36.8 °C)] 97.5 °F (36.4 °C)  Pulse:  [30-91] 88  Resp:  [16-20] 18  SpO2:  [96 %-100 %] 99 %  BP: ()/(0-83) 76/0     No data found.  Body mass index is 24.55 kg/m².      Intake/Output Summary (Last 24 hours) at 12/23/2023 0827  Last data filed at 12/22/2023 2141  Gross per 24 hour   Intake 265 ml   Output 0 ml   Net 265 ml       Hemodynamic Parameters:  CVP:  [7 mmHg] 7 mmHg    Telemetry: SR       Physical Exam  Constitutional:       Comments: Cachectic, temporal wasting   HENT:      Head: Normocephalic and atraumatic.   Eyes:      Conjunctiva/sclera: Conjunctivae normal.      Pupils: Pupils are equal, round, and reactive to light.   Neck:      Comments: Do not appreciate elevated JVP  Cardiovascular:      Rate and Rhythm: Normal rate and regular rhythm.      Comments: Smooth VAD hum  Pulmonary:      Effort: Pulmonary effort is normal.      Breath sounds: Normal breath sounds.   Abdominal:      General: Bowel sounds are normal.      Palpations: Abdomen is soft.   Musculoskeletal:         General: No swelling. Normal range of motion.      Cervical back: Normal range of motion and neck supple.   Skin:     General: Skin is warm and dry.      Capillary Refill: Capillary refill takes 2 to 3 seconds.   Neurological:      General: No focal deficit present.      Mental Status: He is alert and oriented to person, place, and time.            Significant Labs:  CBC:  Recent Labs   Lab 12/21/23  0308 12/22/23  0410 12/23/23  0440   WBC 13.34* 13.20* 11.56   RBC 2.17* 2.59* 2.62*   HGB 6.5* 7.5* 7.6*   HCT 20.7* 22.8* 24.3*    432 457*   MCV 95 88 93   MCH 30.0 29.0 29.0   MCHC 31.4* 32.9 31.3*     BNP:  Recent Labs   Lab 12/18/23  0417 12/20/23  0258 12/22/23  0410   BNP 1,484* 1,130* 1,628*     CMP:  Recent Labs   Lab 12/20/23  0731 12/21/23  0308  12/22/23  0410 12/23/23  0440   * 177* 86  --    CALCIUM 8.1* 8.2* 8.0*  --    ALBUMIN 1.7* 1.7* 1.8*  1.8* 1.9*   PROT 6.1 6.0 6.4  6.4 6.6    136 140  --    K 4.8 4.8 3.7  --    CO2 22* 19* 26  --     106 100  --    BUN 31* 51* 35*  --    CREATININE 2.0* 3.2* 2.6*  --    ALKPHOS 219* 206* 215*  215* 215*   ALT 30 29 28  28 18   AST 61* 56* 50*  50* 48*   BILITOT 0.8 0.6 0.7  0.7 0.5      Coagulation:   Recent Labs   Lab 12/18/23  0417 12/19/23  0419 12/20/23  0258 12/21/23  0352 12/22/23  0410 12/23/23  0440   INR 1.9* 1.8* 2.0* 2.4* 2.9* 2.9*   APTT 48.3* 46.2* 47.0*  --   --   --      LDH:  Recent Labs   Lab 12/21/23  0308 12/22/23  0410 12/23/23  0440   * 485* 451*     Microbiology:  Microbiology Results (last 7 days)       Procedure Component Value Units Date/Time    Fungus culture [[id number]] Collected: 12/06/23 1532    Order Status: Completed Specimen: Body Fluid from Lung, Right Updated: 12/20/23 0949     Fungus (Mycology) Culture Culture in progress      No fungus isolated after 2 weeks    Narrative:      Bronchial Wash            I have reviewed all pertinent labs within the past 24 hours.    Estimated Creatinine Clearance: 32.7 mL/min (A) (based on SCr of 2.6 mg/dL (H)).    Diagnostic Results:  I have reviewed and interpreted all pertinent imaging results/findings within the past 24 hours.  Assessment and Plan:     63 year old male with hx of CAD s/p 3v CABG (unclear anatomy, 2009), ICM with a recent EF of 15-20% s/p ICD (medtronik 2009), DM2 (a1c 7.7), HTN, HLD, Vfib on amio who presents to the ED with CC of SOB     Pt was recently admitted to [facility] [place].  He was started on a Lasix gtt and did well.  Started on gDMT and discharged home on  5 with plans to follow up in HTS clinic for ongoing transplant evaluation at another facility.  He says that about a few days ago he started to notice LE swelling.  This turned into Nelson and orthopnea.  He says he can't walk  to the bathroom without getting SOB.  He also complains of weight gain.  He takes torsemide 20mg BID at home and was told to trial additional Lasix which he did without any improvement.  He was rx metolazone but has not been filled.  He came to the ED     In the ED he was AF with stable VS on RA.  CBC showing chronic anemia.  CMP notable for acute on chronic CKD with baseline around 2.1 and Cr now 2.6.  BNP elevated.  Lactate neg.  CXR showing pulmonary edema.  I evaluated the pt at the bedside.  Bedside TTE showing CVP >15.  He was subsequently admitted to the CCU for diuresis.     He was continued on his home  and was started on a lasix gtt, which he responded well to overnight (net -1700cc. He feels much better this morning as well. Our transplant coordinators have been working on insurance approval and he is now being transferred to [facility] service for transplant evaluation.     * LVAD (left ventricular assist device) present  -HeartMate 3 Implanted  12/1/2023  as DT  -HTS Primary  -Implanted by Dr. Washington  -Continue Coumadin, dosing by PharmD.  Goal INR 2.0-3.0 . Therapeutic today. Discontinue Heparin bridge   -Antiplatelets Not on ASA  -LDH is stable overall today. Will continue to monitor daily.  -Speed set at  4900   rpm, LSL 4500 rpm   -Interrogation notable for no events  -ECHO 12/11 AV does not open, IVS midline, LVEDD 6.1, with HM3 speed set to 4900 rpm  -Not listed for OHTx, declined for OHTX due to comorbidities         Procedure: Device Interrogation Including analysis of device parameters  Current Settings: Ventricular Assist Device  Review of device function is stable/unstable stable        12/21/2023    12:00 PM 12/21/2023    10:00 AM 12/21/2023     9:00 AM 12/21/2023     7:00 AM 12/21/2023     6:00 AM 12/21/2023     5:00 AM 12/21/2023     4:00 AM   TXP LVAD INTERROGATIONS   Type     HeartMate3 HeartMate3 HeartMate3   Flow 4.4 4 4 4.1 4 4.1 4.1   Speed 4900 4900 4900 4900 4900 4900 4900   PI 3.5 4.6  4.5 3.8 4.5 4.5 3.9   Power ([place]) 3.2 3.3 3.2 3.2 3.2 3.2 3.2   LSL 4500 4500 4500 4500 4500 4500 4500   Pulsatility Intermittent pulse Intermittent pulse Intermittent pulse Intermittent pulse Intermittent pulse Intermittent pulse Intermittent pulse         Right foot pain  -Neuropathic pain to plantar portion of right foot  -Continue gabapentin 125 mg BID   -Added venlafaxine     Cachexia  -Daily caloric intake with tube feeds increased to 2160 calories, 98 g protein    Unilateral complete vocal fold paralysis  -Appreciate ENT's help  -S/P augmentation of paralyzed left vocal cord 12/18    Dysphonia  -Speech following closely  -Appreciate ENT's help. S/P augmentation of paralyzed left vocal cord 12/18    Moderate malnutrition  -RD and SLP following  -Tolerating tube feeds. Total daily caloric intake increased to 2160  -Failed MBBS 12/20, continue TF    Leukocytosis  -WBC peaked at 33k, trending down at 15k. Afebrile  -Concern for septic shock. Was also on hydrocortisone with increasing pressor requirements, now discontinued   -ID consulted, started on empiric micafungin, meropenem, and vanc, since completed  -Cultures were all -ve  -Completed course for possible pneumonia, ID signed off     Adjustment disorder with anxiety  -Continue Remeron 15 mg q HS    Depressed mood  -Psychiatry consulted for depressed mood, SI when left alone  -Recommend sitter when alone (possibly with transition to stepdown).  -Continue Mirtazapine  -Psychology also following  -Added venlafaxine 12/20         IVÁN (acute kidney injury)  -Nephrology following      Altered mental status  -multifactorial  -likely metabolic encephalopathy +/- icu delirium   -CT 12/7 negative for acute process  -Continue RRT for metabolic clearance as needed  -EEG done over the weekend no seizures detected, just generalized slowing  -provide frequent re-orientation  -Promote sleep/wake cycle   -Neurology consulted   - Much more awake, continue to encourage and  motivate    Acute on chronic combined systolic and diastolic CHF, NYHA class 4  Pt with known ICM with an EF of 25% on home  presented with decompensated HF.      -lRHC 11/14 RA 14, PA 70/35, PCWP 32, CO 4.1, CI 2.1  -Repeat RHC 11/20 RA 20, PA 60/29 (39), PCWP 29, CO 4.6, CI 2.3  -ECHO 11/21 showed EF 15-20%, mild RV dysfunction, mild MR, LVED 6.9   -Home  5   -Home GDMT: Entreso 24/26 BID (on hold 2/2 IVÁN), hydralazine 25 q 8hr, all held prior to LVAD implant  -Current GDMT: Continue Hyd/Isordil, Farxiga. Will transition Valsartan to Entresto and add Aldactone  -Home diuretic: Lasix 40 mg BID   -IABP inserted 11/21 and underwent DT HM3 implant 12/1, chest closed 12/4  -LVAD speed increased to 4900 rpm on 12/7   -ECHO 12/11 AV does not open, IVS midline, LVEDD 6.1, with HM3 speed set to 4900 rpm  -Increased pressor requirements with Giapreza, Ketty, epi, and vaso 12/11. Able to wean off giapreza overnight 12/11. He has been weaned off all pressors and inotropes  -CVP 9, SVO2 55%, CO 7.8, CI 3.8,   -No significant hypotension. Remains on Midodrine 15 TID      PAF (paroxysmal atrial fibrillation)  Known hx of pAF. In sinus rhythm on admission, but 1 run of AF RVR overnight. He spontaneously converted.  - Continue home amiodarone 400mg qd  - Continue AC      Acute renal failure with acute tubular necrosis superimposed on stage 3b chronic kidney disease  IVÁN on CKD2. Baseline Cr of 1.7-2.0.   - Hold ARNi  -Trend BMPs daily   -Nephrology following   -SLED/SCUF for volume removal began on 12/1. He is anuric  -Did not respond to diuretic challenge with 240 mg IV Lasix, 1000 mg Diuril, and 500 mg IV Diamox done 12/14  -Plan for HD trial today   -IR consulted for tunneled trialysis line (current RIJ trialysis 9 days old)     Type 2 diabetes mellitus without complication, without long-term current use of insulin  -A1c 7.6, not insulin dependent  - Endocrine following, appreciate assistance with blood glucose  management    CAD (coronary artery disease)  -S/p 3vCABG in 2009  -Lipitor on hold 2/2 mild transaminitis  -Not on ASA post VAD    Ventricular tachycardia  Hx VT s/p ICD placement (medtronic 2009)  - Continue home amio 400[place] [id number]  Heart Transplant  [facility] - Cardiology Stepdown

## 2023-12-23 NOTE — PROGRESS NOTES
12/23/23 1330 12/23/23 1333        Hemodialysis Catheter 12/22/23 1024 right internal jugular   Placement Date/Time: 12/22/23 1024   Hand Hygiene: Performed  Barrier Precautions: Performed  Skin Antisepsis: ChloraPrep  Hemodialysis Catheter Type: Tunneled catheter  Location: right internal jugular  Catheter Size (Fr): 14.5 Fr  : Foody...   Site Assessment No drainage  --    Dressing Type CHG impregnated dressing/sponge  --    Dressing Status Clean;Dry;Intact  --    Date on Dressing 12/22/23  --    Dressing Due to be Changed 12/29/23  --    Venous Patency/Care blood return present;flushed w/o difficulty  --    Arterial Patency/Care blood return present;flushed w/o difficulty  --    During Hemodialysis Assessment   Blood Flow Rate (mL/min)  --  400 mL/min   Dialysate Flow Rate (mL/min)  --  700 ml/min   Ultrafiltration Rate (mL/Hr)  --  720 mL/Hr   Arteriovenous Lines Secure  --  Yes   Arterial Pressure (mmHg)  --  -160 mmHg   Venous Pressure (mmHg)  --  110   Blood Volume Processed (Liters)  --  0 L   UF Removed (mL)  --  0 mL   TMP  --  30   Venous Line in Air Detector  --  Yes   Intake (mL)  --  250 mL   Intra-Hemodialysis Comments  --  HD started     BS HD started via right subclavian CVC.

## 2023-12-23 NOTE — NURSING
RIJ trialysis removed per protocol. No complications noted. Site with gauze and tegaderm remains CDI. Patient and wife instructed to call if and signs of bleeding or swelling occurs.

## 2023-12-23 NOTE — PROGRESS NOTES
[facility] - Cardiology Stepdown  Endocrinology  Progress Note    Admit Date: 11/9/2023     Reason for Consult: Management of T2DM, Hyperglycemia     Surgical Procedure and Date: n/a    Diabetes diagnosis year: 1998    Home Diabetes Medications:  Metformin (off since October)   Lab Results   Component Value Date    HGBA1C 7.6 (H) 10/12/2023       How often checking glucose at home?  Once daily in the AM    BG readings on regimen: 150-160s  Hypoglycemia on the regimen?  No  Missed doses on regimen?  n/a    Diabetes Complications include:     Hyperglycemia    Complicating diabetes co morbidities:   CAD s/p CABG, HTN, HLD      HPI:   Patient is a 63 y.o. male with a diagnosis of CAD s/p 3v CABG (unclear anatomy, 2009), ICM with a recent EF of 15-20% s/p ICD (medtronik 2009), DM2 (a1c 7.7), HTN, HLD, Vfib on amio who presents to the ED with CC of SOB. In the ED he was AF with stable VS on RA.  CBC showing chronic anemia.  CMP notable for acute on chronic CKD with baseline around 2.1 and Cr now 2.6.  BNP elevated.  Lactate neg.  CXR showing pulmonary edema. He was subsequently admitted to the CCU for diuresis.  Endocrinology consulted for management of T2DM.          Interval HPI:   No acute events overnight. Patient in room 312/312 A. Blood glucose stable. BG at and above goal on current insulin regimen (Schedule Insulin and SSI (TPN/TF)). Steroid use- None.   19 Days Post-Op  Renal function- Abnormal - 4.3 cr   Vasopressors-  None     Diet NPO Except for: Medication (per NG tube)     Eating:   NPO  Nausea: No  Hypoglycemia and intervention: No  Fever: No  TPN and/or TF: No  BP (!) 76/0 (BP Location: Left arm)   Pulse 88   Temp 97.5 °F (36.4 °C) (Axillary)   Resp 18   Ht 6' (1.829 m)   Wt 82.1 kg (181 lb)   SpO2 99%   BMI 24.55 kg/m²     Labs Reviewed and Include    Recent Labs   Lab 12/23/23  0440 12/23/23  0636   GLU  --  105   CALCIUM  --  8.3*   ALBUMIN 1.9*  --    PROT 6.6  --    NA  --  140   K  --  3.8   CO2  " --  26   CL  --  100   BUN  --  57*   CREATININE  --  4.3*   ALKPHOS 215*  --    ALT 18  --    AST 48*  --    BILITOT 0.5  --      Lab Results   Component Value Date    WBC 11.56 12/23/2023    HGB 7.6 (L) 12/23/2023    HCT 24.3 (L) 12/23/2023    MCV 93 12/23/2023     (H) 12/23/2023     No results for input(s): "TSH", "FREET4" in the last 168 hours.  Lab Results   Component Value Date    HGBA1C 7.6 (H) 10/12/2023       Nutritional status:   Body mass index is 24.55 kg/m².  Lab Results   Component Value Date    ALBUMIN 1.9 (L) 12/23/2023    ALBUMIN 1.8 (L) 12/22/2023    ALBUMIN 1.8 (L) 12/22/2023     Lab Results   Component Value Date    PREALBUMIN 15 (L) 12/23/2023    PREALBUMIN 14 (L) 12/22/2023    PREALBUMIN 10 (L) 12/15/2023       Estimated Creatinine Clearance: 19.8 mL/min (A) (based on SCr of 4.3 mg/dL (H)).    Accu-Checks  Recent Labs     12/21/23  0801 12/21/23  1640 12/21/23  2009 12/21/23  2351 12/22/23  0403 12/22/23  1627 12/22/23  2053 12/22/23  2323 12/23/23  0344 12/23/23  0757   POCTGLUCOSE 120* 151* 176* 135* 96 142* 142* 208* 200* 139*       Current Medications and/or Treatments Impacting Glycemic Control  Immunotherapy:    Immunosuppressants       None          Steroids:   Hormones (From admission, onward)      Start     Stop Route Frequency Ordered    12/13/23 2200  melatonin tablet 6 mg         -- Oral Nightly PRN 12/13/23 2100          Pressors:    Autonomic Drugs (From admission, onward)      Start     Stop Route Frequency Ordered    12/20/23 1500  midodrine tablet 15 mg         -- PER NG TUBE 3 times daily 12/20/23 1314          Hyperglycemia/Diabetes Medications:   Antihyperglycemics (From admission, onward)      Start     Stop Route Frequency Ordered    12/19/23 1600  insulin aspart U-100 pen 5 Units         -- SubQ 6 times per day 12/19/23 1253    12/11/23 1027  insulin aspart U-100 pen 0-10 Units         -- SubQ Every 4 hours PRN 12/11/23 0993            ASSESSMENT and " PLAN    Cardiac/Vascular  * LVAD (left ventricular assist device) present  Optimize BG control to improve wound healing        Acute on chronic combined systolic and diastolic CHF, NYHA class 4  Managed per primary team  Optimize BG control  S/p LVAD     Renal/  IVÁN (acute kidney injury)  Avoid insulin stacking and hypoglycemia.        Endocrine  Type 2 diabetes mellitus without complication, without long-term current use of insulin  BG goal 140-180.      - Novolog 5 units with scheduled TF. (Hold if TF is stopped or if BG < 100 mg/dL);   - BG checks q4hr while on TF/NPO   - Novolog (aspart) insulin prn for BG excursions [facility] (150/25)   - Hypoglycemia protocol in place    ** Please notify Endocrine for any change and/or advance in diet**  ** Please call Endocrine for any BG related issues **    Discharge Planning:   TBD. Please notify endocrinology prior to discharge.                Ebony Carlson PA-C  Endocrinology  [facility] - Cardiology Stepdown

## 2023-12-23 NOTE — PLAN OF CARE
Problem: Adult Inpatient Plan of Care  Goal: Plan of Care Review  Outcome: Ongoing, Progressing  Goal: Patient-Specific Goal (Individualized)  Outcome: Ongoing, Progressing  Goal: Absence of Hospital-Acquired Illness or Injury  Outcome: Ongoing, Progressing  Goal: Optimal Comfort and Wellbeing  Outcome: Ongoing, Progressing  Goal: Readiness for Transition of Care  Outcome: Ongoing, Progressing     Problem: Diabetes Comorbidity  Goal: Blood Glucose Level Within Targeted Range  Outcome: Ongoing, Progressing     Problem: Fluid and Electrolyte Imbalance (Acute Kidney Injury/Impairment)  Goal: Fluid and Electrolyte Balance  Outcome: Ongoing, Progressing     Problem: Oral Intake Inadequate (Acute Kidney Injury/Impairment)  Goal: Optimal Nutrition Intake  Outcome: Ongoing, Progressing     Problem: Renal Function Impairment (Acute Kidney Injury/Impairment)  Goal: Effective Renal Function  Outcome: Ongoing, Progressing     Problem: Infection  Goal: Absence of Infection Signs and Symptoms  Outcome: Ongoing, Progressing     Problem: Adjustment to Illness (Heart Failure)  Goal: Optimal Coping  Outcome: Ongoing, Progressing     Problem: Cardiac Output Decreased (Heart Failure)  Goal: Optimal Cardiac Output  Outcome: Ongoing, Progressing     Problem: Dysrhythmia (Heart Failure)  Goal: Stable Heart Rate and Rhythm  Outcome: Ongoing, Progressing     Problem: Fluid Imbalance (Heart Failure)  Goal: Fluid Balance  Outcome: Ongoing, Progressing     Problem: Functional Ability Impaired (Heart Failure)  Goal: Optimal Functional Ability  Outcome: Ongoing, Progressing     Problem: Oral Intake Inadequate (Heart Failure)  Goal: Optimal Nutrition Intake  Outcome: Ongoing, Progressing     Problem: Respiratory Compromise (Heart Failure)  Goal: Effective Oxygenation and Ventilation  Outcome: Ongoing, Progressing     Problem: Sleep Disordered Breathing (Heart Failure)  Goal: Effective Breathing Pattern During Sleep  Outcome: Ongoing,  Progressing     Problem: Cardiac Output Decreased  Goal: Effective Cardiac Output  Outcome: Ongoing, Progressing     Problem: Fall Injury Risk  Goal: Absence of Fall and Fall-Related Injury  Outcome: Ongoing, Progressing     Problem: Coping Ineffective  Goal: Effective Coping  Outcome: Ongoing, Progressing     Problem: Skin Injury Risk Increased  Goal: Skin Health and Integrity  Outcome: Ongoing, Progressing     Problem: Adjustment to Device (Ventricular Assist Device)  Goal: Optimal Adjustment to Device  Outcome: Ongoing, Progressing     Problem: Bleeding (Ventricular Assist Device)  Goal: Absence of Bleeding  Outcome: Ongoing, Progressing     Problem: Embolism (Ventricular Assist Device)  Goal: Absence of Embolism Signs and Symptoms  Outcome: Ongoing, Progressing     Problem: Hemodynamic Instability (Ventricular Assist Device)  Goal: Optimal Blood Flow  Outcome: Ongoing, Progressing     Problem: Infection (Ventricular Assist Device)  Goal: Absence of Infection Signs and Symptoms  Outcome: Ongoing, Progressing     Problem: Right-Sided Heart Compromise (Ventricular Assist Device)  Goal: Effective Right-Sided Heart Function  Outcome: Ongoing, Progressing     Problem: Device-Related Complication Risk (CRRT (Continuous Renal Replacement Therapy))  Goal: Safe, Effective Therapy Delivery  Outcome: Ongoing, Progressing     Problem: Hypothermia (CRRT (Continuous Renal Replacement Therapy))  Goal: Body Temperature Maintained in Desired Range  Outcome: Ongoing, Progressing     Problem: Infection (CRRT (Continuous Renal Replacement Therapy))  Goal: Absence of Infection Signs and Symptoms  Outcome: Ongoing, Progressing     Problem: Skin and Tissue Injury (Artificial Airway)  Goal: Absence of Device-Related Skin or Tissue Injury  Outcome: Ongoing, Progressing     Problem: Adjustment to Illness (Sepsis/Septic Shock)  Goal: Optimal Coping  Outcome: Ongoing, Progressing     Problem: Bleeding (Sepsis/Septic Shock)  Goal: Absence of  Bleeding  Outcome: Ongoing, Progressing     Problem: Glycemic Control Impaired (Sepsis/Septic Shock)  Goal: Blood Glucose Level Within Desired Range  Outcome: Ongoing, Progressing     Problem: Infection Progression (Sepsis/Septic Shock)  Goal: Absence of Infection Signs and Symptoms  Outcome: Ongoing, Progressing     Problem: Nutrition Impaired (Sepsis/Septic Shock)  Goal: Optimal Nutrition Intake  Outcome: Ongoing, Progressing     Problem: Impaired Wound Healing  Goal: Optimal Wound Healing  Outcome: Ongoing, Progressing     Problem: Device-Related Complication Risk (Hemodialysis)  Goal: Safe, Effective Therapy Delivery  Outcome: Ongoing, Progressing     Problem: Hemodynamic Instability (Hemodialysis)  Goal: Effective Tissue Perfusion  Outcome: Ongoing, Progressing

## 2023-12-23 NOTE — SUBJECTIVE & OBJECTIVE
Interval History: No complaints or overnight events. S/P tunneled HD cath by IR yesterday, then RIJ TLC was D/C'd. CVP 7, remains anuric. Do not have chemistries back yet but plan for HD today with UF goal 1-2L. WCC continues to trend down, afebrile. Progressing slowly with PT/OT. Tolerating tube feeds at goal but remains NPO    Continuous Infusions:   sodium chloride 0.9% 0 mL/hr (12/17/23 2300)    sodium chloride 0.9%      dextrose 10 % in water (D10W)       Scheduled Meds:   sodium chloride 0.9%   Intravenous Once    acetaminophen  1,000 mg Per NG tube TID    amiodarone  400 mg Per NG tube Daily    balsam peru-castor oiL   Topical (Top) BID    gabapentin  125 mg Per NG tube Q12H    insulin aspart U-100  5 Units Subcutaneous 6 times per day    midodrine  15 mg Per NG tube TID    mirtazapine  15 mg Per NG tube Nightly    pantoprazole  40 mg Intravenous Daily    polyethylene glycol  17 g Per NG tube Daily    psyllium husk (aspartame)  1 packet Per NG tube Daily    senna-docusate 8.6-50 mg  2 tablet Per NG tube Daily    venlafaxine  37.5 mg Per G Tube Daily    warfarin  5 mg Per G Tube Daily     PRN Meds:0.9%  NaCl infusion (for blood administration), albuterol sulfate, bisacodyL, dextrose 10 % in water (D10W), dextrose 10%, dextrose 10%, glucagon (human recombinant), insulin aspart U-100, melatonin, oxyCODONE, Flushing PICC/Midline Protocol **AND** [DISCONTINUED] sodium chloride 0.9% **AND** sodium chloride 0.9%    Review of patient's allergies indicates:  No Known Allergies  Objective:     Vital Signs (Most Recent):  Temp: 97.5 °F (36.4 °C) (12/23/23 0730)  Pulse: 88 (12/23/23 0730)  Resp: 18 (12/23/23 0730)  BP: (!) 76/0 (12/23/23 0756)  SpO2: 99 % (12/23/23 0730) Vital Signs (24h Range):  Temp:  [97.3 °F (36.3 °C)-98.2 °F (36.8 °C)] 97.5 °F (36.4 °C)  Pulse:  [30-91] 88  Resp:  [16-20] 18  SpO2:  [96 %-100 %] 99 %  BP: ()/(0-83) 76/0     No data found.  Body mass index is 24.55 kg/m².      Intake/Output  Summary (Last 24 hours) at 12/23/2023 0827  Last data filed at 12/22/2023 2141  Gross per 24 hour   Intake 265 ml   Output 0 ml   Net 265 ml       Hemodynamic Parameters:  CVP:  [7 mmHg] 7 mmHg    Telemetry: SR       Physical Exam  Constitutional:       Comments: Cachectic, temporal wasting   HENT:      Head: Normocephalic and atraumatic.   Eyes:      Conjunctiva/sclera: Conjunctivae normal.      Pupils: Pupils are equal, round, and reactive to light.   Neck:      Comments: Do not appreciate elevated JVP  Cardiovascular:      Rate and Rhythm: Normal rate and regular rhythm.      Comments: Smooth VAD hum  Pulmonary:      Effort: Pulmonary effort is normal.      Breath sounds: Normal breath sounds.   Abdominal:      General: Bowel sounds are normal.      Palpations: Abdomen is soft.   Musculoskeletal:         General: No swelling. Normal range of motion.      Cervical back: Normal range of motion and neck supple.   Skin:     General: Skin is warm and dry.      Capillary Refill: Capillary refill takes 2 to 3 seconds.   Neurological:      General: No focal deficit present.      Mental Status: He is alert and oriented to person, place, and time.            Significant Labs:  CBC:  Recent Labs   Lab 12/21/23  0308 12/22/23  0410 12/23/23  0440   WBC 13.34* 13.20* 11.56   RBC 2.17* 2.59* 2.62*   HGB 6.5* 7.5* 7.6*   HCT 20.7* 22.8* 24.3*    432 457*   MCV 95 88 93   MCH 30.0 29.0 29.0   MCHC 31.4* 32.9 31.3*     BNP:  Recent Labs   Lab 12/18/23  0417 12/20/23  0258 12/22/23  0410   BNP 1,484* 1,130* 1,628*     CMP:  Recent Labs   Lab 12/20/23  0731 12/21/23  0308 12/22/23  0410 12/23/23  0440   * 177* 86  --    CALCIUM 8.1* 8.2* 8.0*  --    ALBUMIN 1.7* 1.7* 1.8*  1.8* 1.9*   PROT 6.1 6.0 6.4  6.4 6.6    136 140  --    K 4.8 4.8 3.7  --    CO2 22* 19* 26  --     106 100  --    BUN 31* 51* 35*  --    CREATININE 2.0* 3.2* 2.6*  --    ALKPHOS 219* 206* 215*  215* 215*   ALT 30 29 28  28 18   AST  61* 56* 50*  50* 48*   BILITOT 0.8 0.6 0.7  0.7 0.5      Coagulation:   Recent Labs   Lab 12/18/23  0417 12/19/23  0419 12/20/23  0258 12/21/23  0352 12/22/23  0410 12/23/23  0440   INR 1.9* 1.8* 2.0* 2.4* 2.9* 2.9*   APTT 48.3* 46.2* 47.0*  --   --   --      LDH:  Recent Labs   Lab 12/21/23  0308 12/22/23  0410 12/23/23  0440   * 485* 451*     Microbiology:  Microbiology Results (last 7 days)       Procedure Component Value Units Date/Time    Fungus culture [[id number]] Collected: 12/06/23 1532    Order Status: Completed Specimen: Body Fluid from Lung, Right Updated: 12/20/23 0949     Fungus (Mycology) Culture Culture in progress      No fungus isolated after 2 weeks    Narrative:      Bronchial Wash            I have reviewed all pertinent labs within the past 24 hours.    Estimated Creatinine Clearance: 32.7 mL/min (A) (based on SCr of 2.6 mg/dL (H)).    Diagnostic Results:  I have reviewed and interpreted all pertinent imaging results/findings within the past 24 hours.

## 2023-12-23 NOTE — SUBJECTIVE & OBJECTIVE
"Interval HPI:   No acute events overnight. Patient in room 312/312 A. Blood glucose stable. BG at and above goal on current insulin regimen (Schedule Insulin and SSI (TPN/TF)). Steroid use- None.   19 Days Post-Op  Renal function- Abnormal - 4.3 cr   Vasopressors-  None     Diet NPO Except for: Medication (per NG tube)     Eating:   NPO  Nausea: No  Hypoglycemia and intervention: No  Fever: No  TPN and/or TF: No  BP (!) 76/0 (BP Location: Left arm)   Pulse 88   Temp 97.5 °F (36.4 °C) (Axillary)   Resp 18   Ht 6' (1.829 m)   Wt 82.1 kg (181 lb)   SpO2 99%   BMI 24.55 kg/m²     Labs Reviewed and Include    Recent Labs   Lab 12/23/23  0440 12/23/23  0636   GLU  --  105   CALCIUM  --  8.3*   ALBUMIN 1.9*  --    PROT 6.6  --    NA  --  140   K  --  3.8   CO2  --  26   CL  --  100   BUN  --  57*   CREATININE  --  4.3*   ALKPHOS 215*  --    ALT 18  --    AST 48*  --    BILITOT 0.5  --      Lab Results   Component Value Date    WBC 11.56 12/23/2023    HGB 7.6 (L) 12/23/2023    HCT 24.3 (L) 12/23/2023    MCV 93 12/23/2023     (H) 12/23/2023     No results for input(s): "TSH", "FREET4" in the last 168 hours.  Lab Results   Component Value Date    HGBA1C 7.6 (H) 10/12/2023       Nutritional status:   Body mass index is 24.55 kg/m².  Lab Results   Component Value Date    ALBUMIN 1.9 (L) 12/23/2023    ALBUMIN 1.8 (L) 12/22/2023    ALBUMIN 1.8 (L) 12/22/2023     Lab Results   Component Value Date    PREALBUMIN 15 (L) 12/23/2023    PREALBUMIN 14 (L) 12/22/2023    PREALBUMIN 10 (L) 12/15/2023       Estimated Creatinine Clearance: 19.8 mL/min (A) (based on SCr of 4.3 mg/dL (H)).    Accu-Checks  Recent Labs     12/21/23  0801 12/21/23  1640 12/21/23  2009 12/21/23  2351 12/22/23  0403 12/22/23  1627 12/22/23  2053 12/22/23  2323 12/23/23  0344 12/23/23  0757   POCTGLUCOSE 120* 151* 176* 135* 96 142* 142* 208* 200* 139*       Current Medications and/or Treatments Impacting Glycemic Control  Immunotherapy:  "   Immunosuppressants       None          Steroids:   Hormones (From admission, onward)      Start     Stop Route Frequency Ordered    12/13/23 2200  melatonin tablet 6 mg         -- Oral Nightly PRN 12/13/23 2100          Pressors:    Autonomic Drugs (From admission, onward)      Start     Stop Route Frequency Ordered    12/20/23 1500  midodrine tablet 15 mg         -- PER NG TUBE 3 times daily 12/20/23 1314          Hyperglycemia/Diabetes Medications:   Antihyperglycemics (From admission, onward)      Start     Stop Route Frequency Ordered    12/19/23 1600  insulin aspart U-100 pen 5 Units         -- SubQ 6 times per day 12/19/23 1253    12/11/23 1027  insulin aspart U-100 pen 0-10 Units         -- SubQ Every 4 hours PRN 12/11/23 0999

## 2023-12-23 NOTE — PROGRESS NOTES
12/23/23 1634        Hemodialysis Catheter 12/22/23 1024 right internal jugular   Placement Date/Time: 12/22/23 1024   Hand Hygiene: Performed  Barrier Precautions: Performed  Skin Antisepsis: ChloraPrep  Hemodialysis Catheter Type: Tunneled catheter  Location: right internal jugular  Catheter Size (Fr): 14.5 Fr  : TouchPal...   Dressing Status Clean;Dry;Intact   Venous Patency/Care flushed w/o difficulty;normal saline locked   Arterial Patency/Care flushed w/o difficulty;normal saline locked   During Hemodialysis Assessment   Blood Flow Rate (mL/min) 400 mL/min   Dialysate Flow Rate (mL/min) 700 ml/min   Ultrafiltration Rate (mL/Hr) 720 mL/Hr   Arteriovenous Lines Secure Yes   Arterial Pressure (mmHg) -160 mmHg   Venous Pressure (mmHg) 120   UF Removed (mL) 2150 mL   TMP 30   Venous Line in Air Detector Yes   Intra-Hemodialysis Comments HD completed   Post-Hemodialysis Assessment   Rinseback Volume (mL) 250 mL   Blood Volume Processed (Liters) 65.7 L   Dialyzer Clearance Moderately streaked   Duration of Treatment 180 minutes   Total UF (mL) 2150 mL   Net Fluid Removal 1500   Patient Response to Treatment tolerated well     HD completed, patient left in room NAD.

## 2023-12-24 LAB
ALBUMIN SERPL BCP-MCNC: 1.9 G/DL (ref 3.5–5.2)
ALP SERPL-CCNC: 201 U/L (ref 55–135)
ALT SERPL W/O P-5'-P-CCNC: 15 U/L (ref 10–44)
ANION GAP SERPL CALC-SCNC: 10 MMOL/L (ref 8–16)
AST SERPL-CCNC: 55 U/L (ref 10–40)
BASOPHILS # BLD AUTO: 0.06 K/UL (ref 0–0.2)
BASOPHILS NFR BLD: 0.5 % (ref 0–1.9)
BILIRUB DIRECT SERPL-MCNC: 0.2 MG/DL (ref 0.1–0.3)
BILIRUB SERPL-MCNC: 0.5 MG/DL (ref 0.1–1)
BUN SERPL-MCNC: 37 MG/DL (ref 8–23)
CA-I BLDV-SCNC: 1.17 MMOL/L (ref 1.06–1.42)
CA-I BLDV-SCNC: 1.18 MMOL/L (ref 1.06–1.42)
CA-I BLDV-SCNC: 1.18 MMOL/L (ref 1.06–1.42)
CALCIUM SERPL-MCNC: 8.5 MG/DL (ref 8.7–10.5)
CHLORIDE SERPL-SCNC: 107 MMOL/L (ref 95–110)
CO2 SERPL-SCNC: 22 MMOL/L (ref 23–29)
CREAT SERPL-MCNC: 3.1 MG/DL (ref 0.5–1.4)
DIFFERENTIAL METHOD BLD: ABNORMAL
EOSINOPHIL # BLD AUTO: 0.1 K/UL (ref 0–0.5)
EOSINOPHIL NFR BLD: 0.8 % (ref 0–8)
ERYTHROCYTE [DISTWIDTH] IN BLOOD BY AUTOMATED COUNT: 19.8 % (ref 11.5–14.5)
EST. GFR  (NO RACE VARIABLE): 22 ML/MIN/1.73 M^2
GLUCOSE SERPL-MCNC: 182 MG/DL (ref 70–110)
HCT VFR BLD AUTO: 24.4 % (ref 40–54)
HGB BLD-MCNC: 7.5 G/DL (ref 14–18)
IMM GRANULOCYTES # BLD AUTO: 0.06 K/UL (ref 0–0.04)
IMM GRANULOCYTES NFR BLD AUTO: 0.5 % (ref 0–0.5)
INR PPP: 3.3 (ref 0.8–1.2)
LDH SERPL L TO P-CCNC: 437 U/L (ref 110–260)
LYMPHOCYTES # BLD AUTO: 0.8 K/UL (ref 1–4.8)
LYMPHOCYTES NFR BLD: 6.3 % (ref 18–48)
MAGNESIUM SERPL-MCNC: 2.1 MG/DL (ref 1.6–2.6)
MCH RBC QN AUTO: 29.1 PG (ref 27–31)
MCHC RBC AUTO-ENTMCNC: 30.7 G/DL (ref 32–36)
MCV RBC AUTO: 95 FL (ref 82–98)
MONOCYTES # BLD AUTO: 0.5 K/UL (ref 0.3–1)
MONOCYTES NFR BLD: 4.1 % (ref 4–15)
NEUTROPHILS # BLD AUTO: 10.4 K/UL (ref 1.8–7.7)
NEUTROPHILS NFR BLD: 87.8 % (ref 38–73)
NRBC BLD-RTO: 0 /100 WBC
PHOSPHATE SERPL-MCNC: 2.9 MG/DL (ref 2.7–4.5)
PLATELET # BLD AUTO: 467 K/UL (ref 150–450)
PMV BLD AUTO: 10.7 FL (ref 9.2–12.9)
POCT GLUCOSE: 115 MG/DL (ref 70–110)
POCT GLUCOSE: 181 MG/DL (ref 70–110)
POCT GLUCOSE: 184 MG/DL (ref 70–110)
POCT GLUCOSE: 196 MG/DL (ref 70–110)
POCT GLUCOSE: 207 MG/DL (ref 70–110)
POCT GLUCOSE: 98 MG/DL (ref 70–110)
POTASSIUM SERPL-SCNC: 4.3 MMOL/L (ref 3.5–5.1)
PROT SERPL-MCNC: 6.6 G/DL (ref 6–8.4)
PROTHROMBIN TIME: 32.5 SEC (ref 9–12.5)
RBC # BLD AUTO: 2.58 M/UL (ref 4.6–6.2)
SODIUM SERPL-SCNC: 139 MMOL/L (ref 136–145)
TB INDURATION 48 - 72 HR READ: 0 MM
WBC # BLD AUTO: 11.88 K/UL (ref 3.9–12.7)

## 2023-12-24 PROCEDURE — C9113 INJ PANTOPRAZOLE SODIUM, VIA: HCPCS | Performed by: PHYSICIAN ASSISTANT

## 2023-12-24 PROCEDURE — 97530 THERAPEUTIC ACTIVITIES: CPT

## 2023-12-24 PROCEDURE — 25000003 PHARM REV CODE 250: Performed by: PHYSICIAN ASSISTANT

## 2023-12-24 PROCEDURE — 25000003 PHARM REV CODE 250: Performed by: STUDENT IN AN ORGANIZED HEALTH CARE EDUCATION/TRAINING PROGRAM

## 2023-12-24 PROCEDURE — 80048 BASIC METABOLIC PNL TOTAL CA: CPT | Performed by: NURSE PRACTITIONER

## 2023-12-24 PROCEDURE — 25000003 PHARM REV CODE 250

## 2023-12-24 PROCEDURE — 99233 SBSQ HOSP IP/OBS HIGH 50: CPT | Mod: ,,, | Performed by: NURSE PRACTITIONER

## 2023-12-24 PROCEDURE — 97535 SELF CARE MNGMENT TRAINING: CPT | Mod: CO

## 2023-12-24 PROCEDURE — 25000242 PHARM REV CODE 250 ALT 637 W/ HCPCS: Performed by: INTERNAL MEDICINE

## 2023-12-24 PROCEDURE — 85610 PROTHROMBIN TIME: CPT | Performed by: INTERNAL MEDICINE

## 2023-12-24 PROCEDURE — 85025 COMPLETE CBC W/AUTO DIFF WBC: CPT | Performed by: INTERNAL MEDICINE

## 2023-12-24 PROCEDURE — 84100 ASSAY OF PHOSPHORUS: CPT | Performed by: INTERNAL MEDICINE

## 2023-12-24 PROCEDURE — 83735 ASSAY OF MAGNESIUM: CPT | Performed by: INTERNAL MEDICINE

## 2023-12-24 PROCEDURE — 83615 LACTATE (LD) (LDH) ENZYME: CPT | Performed by: STUDENT IN AN ORGANIZED HEALTH CARE EDUCATION/TRAINING PROGRAM

## 2023-12-24 PROCEDURE — 80076 HEPATIC FUNCTION PANEL: CPT | Performed by: INTERNAL MEDICINE

## 2023-12-24 PROCEDURE — 20600001 HC STEP DOWN PRIVATE ROOM

## 2023-12-24 PROCEDURE — 25000003 PHARM REV CODE 250: Performed by: INTERNAL MEDICINE

## 2023-12-24 PROCEDURE — 27000248 HC VAD-ADDITIONAL DAY

## 2023-12-24 PROCEDURE — 25000003 PHARM REV CODE 250: Performed by: NURSE PRACTITIONER

## 2023-12-24 PROCEDURE — 63600175 PHARM REV CODE 636 W HCPCS: Performed by: PHYSICIAN ASSISTANT

## 2023-12-24 PROCEDURE — 93750 INTERROGATION VAD IN PERSON: CPT | Mod: ,,, | Performed by: INTERNAL MEDICINE

## 2023-12-24 PROCEDURE — 82330 ASSAY OF CALCIUM: CPT | Mod: 91 | Performed by: STUDENT IN AN ORGANIZED HEALTH CARE EDUCATION/TRAINING PROGRAM

## 2023-12-24 PROCEDURE — 97116 GAIT TRAINING THERAPY: CPT

## 2023-12-24 RX ORDER — DIPHENHYDRAMINE HCL 25 MG
25 CAPSULE ORAL ONCE
Status: COMPLETED | OUTPATIENT
Start: 2023-12-24 | End: 2023-12-24

## 2023-12-24 RX ORDER — WARFARIN 2.5 MG/1
2.5 TABLET ORAL DAILY
Status: DISCONTINUED | OUTPATIENT
Start: 2023-12-24 | End: 2023-12-25

## 2023-12-24 RX ORDER — TRAMADOL HYDROCHLORIDE 50 MG/1
50 TABLET ORAL EVERY 8 HOURS PRN
Status: DISCONTINUED | OUTPATIENT
Start: 2023-12-24 | End: 2024-01-13

## 2023-12-24 RX ADMIN — ACETAMINOPHEN 1000 MG: 500 TABLET ORAL at 09:12

## 2023-12-24 RX ADMIN — INSULIN ASPART 5 UNITS: 100 INJECTION, SOLUTION INTRAVENOUS; SUBCUTANEOUS at 09:12

## 2023-12-24 RX ADMIN — TRAMADOL HYDROCHLORIDE 50 MG: 50 TABLET, COATED ORAL at 11:12

## 2023-12-24 RX ADMIN — INSULIN ASPART 2 UNITS: 100 INJECTION, SOLUTION INTRAVENOUS; SUBCUTANEOUS at 05:12

## 2023-12-24 RX ADMIN — MIDODRINE HYDROCHLORIDE 15 MG: 5 TABLET ORAL at 03:12

## 2023-12-24 RX ADMIN — GABAPENTIN 125 MG: 250 SOLUTION ORAL at 09:12

## 2023-12-24 RX ADMIN — WARFARIN SODIUM 2.5 MG: 2.5 TABLET ORAL at 05:12

## 2023-12-24 RX ADMIN — Medication: at 09:12

## 2023-12-24 RX ADMIN — MIRTAZAPINE 15 MG: 15 TABLET, FILM COATED ORAL at 09:12

## 2023-12-24 RX ADMIN — MIDODRINE HYDROCHLORIDE 15 MG: 5 TABLET ORAL at 09:12

## 2023-12-24 RX ADMIN — INSULIN ASPART 5 UNITS: 100 INJECTION, SOLUTION INTRAVENOUS; SUBCUTANEOUS at 05:12

## 2023-12-24 RX ADMIN — INSULIN ASPART 5 UNITS: 100 INJECTION, SOLUTION INTRAVENOUS; SUBCUTANEOUS at 12:12

## 2023-12-24 RX ADMIN — TRAMADOL HYDROCHLORIDE 50 MG: 50 TABLET, COATED ORAL at 03:12

## 2023-12-24 RX ADMIN — INSULIN ASPART 5 UNITS: 100 INJECTION, SOLUTION INTRAVENOUS; SUBCUTANEOUS at 04:12

## 2023-12-24 RX ADMIN — INSULIN ASPART 2 UNITS: 100 INJECTION, SOLUTION INTRAVENOUS; SUBCUTANEOUS at 09:12

## 2023-12-24 RX ADMIN — VENLAFAXINE 37.5 MG: 37.5 TABLET ORAL at 09:12

## 2023-12-24 RX ADMIN — POLYETHYLENE GLYCOL 3350 17 G: 17 POWDER, FOR SOLUTION ORAL at 09:12

## 2023-12-24 RX ADMIN — INSULIN ASPART 5 UNITS: 100 INJECTION, SOLUTION INTRAVENOUS; SUBCUTANEOUS at 11:12

## 2023-12-24 RX ADMIN — AMIODARONE HYDROCHLORIDE 400 MG: 200 TABLET ORAL at 09:12

## 2023-12-24 RX ADMIN — DIPHENHYDRAMINE HYDROCHLORIDE 25 MG: 25 CAPSULE ORAL at 12:12

## 2023-12-24 RX ADMIN — PSYLLIUM HUSK 1 PACKET: 3.4 POWDER ORAL at 09:12

## 2023-12-24 RX ADMIN — PANTOPRAZOLE SODIUM 40 MG: 40 INJECTION, POWDER, FOR SOLUTION INTRAVENOUS at 09:12

## 2023-12-24 RX ADMIN — INSULIN ASPART 2 UNITS: 100 INJECTION, SOLUTION INTRAVENOUS; SUBCUTANEOUS at 12:12

## 2023-12-24 RX ADMIN — INSULIN ASPART 4 UNITS: 100 INJECTION, SOLUTION INTRAVENOUS; SUBCUTANEOUS at 04:12

## 2023-12-24 RX ADMIN — SENNOSIDES AND DOCUSATE SODIUM 2 TABLET: 8.6; 5 TABLET ORAL at 09:12

## 2023-12-24 NOTE — NURSING
Attempted to perform LVAD dressing, pt's wife stated she will perform LVAD dressing later throughout the day.

## 2023-12-24 NOTE — PLAN OF CARE
Problem: Adult Inpatient Plan of Care  Goal: Patient-Specific Goal (Individualized)  Outcome: Ongoing, Progressing  Flowsheets (Taken 12/23/2023 1557)  Anxieties, Fears or Concerns: Wants to go home  Individualized Care Needs:   Encourage SLP excercies provided by team. Encourage bed excercies. Encpurage LVAD study. Perform Daily CVP with AM labs. Pt received dialysis today; 1.5 L removed. LVAD dressing to be done Q 3 days with silverlon dressing. Maintain BG protocol.

## 2023-12-24 NOTE — PROGRESS NOTES
12/23/2023  [place]    Current provider:  Juventino Bermudez Jr.*    Device interrogation:      12/23/2023     3:21 PM 12/23/2023    12:09 PM 12/23/2023     7:51 AM 12/23/2023     3:48 AM 12/22/2023    11:00 PM 12/22/2023     8:01 PM 12/22/2023     2:51 PM   TXP LVAD INTERROGATIONS   Type HeartMate3 HeartMate3 HeartMate3 HeartMate3 HeartMate3 HeartMate3 HeartMate3   Flow 3.4 3.7 3.9 3.8 3.8 4.2 3.6   Speed 4900 4900 4900 4900 4900 4900 4900   PI 7.1 6.1 4.6 5.6 6.1 3.7 5.9   Power ([place]) 3.3 3.3 3.4 3.3 3.3 3.2 3.3   LSL   4500 4500 4500 4500 4500   Pulsatility   Intermittent pulse Intermittent pulse Intermittent pulse Intermittent pulse Intermittent pulse          Rounded on [facility] to ensure all mechanical assist device settings (IABP or VAD) were appropriate and all parameters were within limits.  I was able to ensure all back up equipment was present, the staff had no issues, and the Perfusion Department daily rounding was complete.      For implantable VADs: Interrogation of Ventricular assist device was performed with analysis of device parameters and review of device function. I have personally reviewed the interrogation findings and agree with findings as stated.     In emergency, the nursing units have been notified to contact the perfusion department either by:  Calling [phone] from 630am to 4pm Mon thru Fri, utilizing the On-Call Finder functionality of Epic and searching for Perfusion, or by contacting the hospital  from 4pm to 630am and on weekends and asking to speak with the perfusionist on call.    7:35 PM

## 2023-12-24 NOTE — PROGRESS NOTES
[facility] - Cardiology Stepdown  Heart Transplant  Progress Note    Patient Name: Radha Abbott  MRN: [id number]  Admission Date: 11/9/2023  Hospital Length of Stay: 45 days  Attending Physician: Juventino Bermudez Jr.*  Primary Care Provider: Vasu Kong MD  Principal Problem:LVAD (left ventricular assist device) present    Subjective:   Interval History: No complaints or overnight events. HD yesterday with plan to repeat on Tuesday. Sacral ulcer slowly improving.     Continuous Infusions:   sodium chloride 0.9% 0 mL/hr (12/17/23 2300)    sodium chloride 0.9%      dextrose 10 % in water (D10W)       Scheduled Meds:   acetaminophen  1,000 mg Per NG tube TID    amiodarone  400 mg Per NG tube Daily    balsam peru-castor oiL   Topical (Top) BID    gabapentin  125 mg Per NG tube Q12H    insulin aspart U-100  5 Units Subcutaneous 6 times per day    midodrine  15 mg Per NG tube TID    mirtazapine  15 mg Per NG tube Nightly    pantoprazole  40 mg Intravenous Daily    polyethylene glycol  17 g Per NG tube Daily    psyllium husk (aspartame)  1 packet Per NG tube Daily    senna-docusate 8.6-50 mg  2 tablet Per NG tube Daily    venlafaxine  37.5 mg Per G Tube Daily    warfarin  2.5 mg Per G Tube Daily     PRN Meds:0.9%  NaCl infusion (for blood administration), albuterol sulfate, bisacodyL, dextrose 10 % in water (D10W), dextrose 10%, dextrose 10%, glucagon (human recombinant), insulin aspart U-100, melatonin, oxyCODONE, Flushing PICC/Midline Protocol **AND** [DISCONTINUED] sodium chloride 0.9% **AND** sodium chloride 0.9%    Review of patient's allergies indicates:  No Known Allergies  Objective:     Vital Signs (Most Recent):  Temp: 98.2 °F (36.8 °C) (12/24/23 0741)  Pulse: 94 (12/24/23 0741)  Resp: 16 (12/24/23 0741)  BP: 100/62 (12/24/23 0741)  SpO2:  (unable to get reading) (12/24/23 0741) Vital Signs (24h Range):  Temp:  [97.1 °F (36.2 °C)-98.2 °F (36.8 °C)] 98.2 °F (36.8 °C)  Pulse:  [] 94  Resp:  [16-18]  16  SpO2:  [91 %-99 %] 99 %  BP: ()/(0-89) 100/62     No data found.  Body mass index is 24.55 kg/m².      Intake/Output Summary (Last 24 hours) at 12/24/2023 0832  Last data filed at 12/23/2023 2040  Gross per 24 hour   Intake 380 ml   Output 2390 ml   Net -2010 ml       Hemodynamic Parameters:  CVP:  [7 mmHg] 7 mmHg    Telemetry: SR       Physical Exam  Constitutional:       Comments: Cachectic, temporal wasting   HENT:      Head: Normocephalic and atraumatic.   Eyes:      Conjunctiva/sclera: Conjunctivae normal.      Pupils: Pupils are equal, round, and reactive to light.   Neck:      Comments: Do not appreciate elevated JVP  Cardiovascular:      Rate and Rhythm: Normal rate and regular rhythm.      Comments: Smooth VAD hum  Pulmonary:      Effort: Pulmonary effort is normal.      Breath sounds: Normal breath sounds.   Abdominal:      General: Bowel sounds are normal.      Palpations: Abdomen is soft.   Musculoskeletal:         General: No swelling. Normal range of motion.      Cervical back: Normal range of motion and neck supple.   Skin:     General: Skin is warm and dry.      Capillary Refill: Capillary refill takes 2 to 3 seconds.   Neurological:      General: No focal deficit present.      Mental Status: He is alert and oriented to person, place, and time.            Significant Labs:  CBC:  Recent Labs   Lab 12/22/23 0410 12/23/23 0440 12/24/23  0524   WBC 13.20* 11.56 11.88   RBC 2.59* 2.62* 2.58*   HGB 7.5* 7.6* 7.5*   HCT 22.8* 24.3* 24.4*    457* 467*   MCV 88 93 95   MCH 29.0 29.0 29.1   MCHC 32.9 31.3* 30.7*     BNP:  Recent Labs   Lab 12/18/23 0417 12/20/23  0258 12/22/23  0410   BNP 1,484* 1,130* 1,628*     CMP:  Recent Labs   Lab 12/22/23  0410 12/23/23  0440 12/23/23  0636 12/24/23  0524   GLU 86  --  105 182*   CALCIUM 8.0*  --  8.3* 8.5*   ALBUMIN 1.8*  1.8* 1.9*  --  1.9*   PROT 6.4  6.4 6.6  --  6.6     --  140 139   K 3.7  --  3.8 4.3   CO2 26  --  26 22*     --   100 107   BUN 35*  --  57* 37*   CREATININE 2.6*  --  4.3* 3.1*   ALKPHOS 215*  215* 215*  --  201*   ALT 28  28 18  --  15   AST 50*  50* 48*  --  55*   BILITOT 0.7  0.7 0.5  --  0.5      Coagulation:   Recent Labs   Lab 12/18/23  0417 12/19/23  0419 12/20/23  0258 12/21/23  0352 12/22/23  0410 12/23/23  0440 12/24/23  0524   INR 1.9* 1.8* 2.0*   < > 2.9* 2.9* 3.3*   APTT 48.3* 46.2* 47.0*  --   --   --   --     < > = values in this interval not displayed.     LDH:  Recent Labs   Lab 12/22/23 0410 12/23/23  0440 12/24/23  0524   * 451* 437*     Microbiology:  Microbiology Results (last 7 days)       Procedure Component Value Units Date/Time    Fungus culture [[id number]] Collected: 12/06/23 1532    Order Status: Completed Specimen: Body Fluid from Lung, Right Updated: 12/20/23 0949     Fungus (Mycology) Culture Culture in progress      No fungus isolated after 2 weeks    Narrative:      Bronchial Wash            I have reviewed all pertinent labs within the past 24 hours.    Estimated Creatinine Clearance: 27.5 mL/min (A) (based on SCr of 3.1 mg/dL (H)).    Diagnostic Results:  I have reviewed and interpreted all pertinent imaging results/findings within the past 24 hours.  Assessment and Plan:     63 year old male with hx of CAD s/p 3v CABG (unclear anatomy, 2009), ICM with a recent EF of 15-20% s/p ICD (medtronik 2009), DM2 (a1c 7.7), HTN, HLD, Vfib on amio who presents to the ED with CC of SOB     Pt was recently admitted to [facility] [place].  He was started on a Lasix gtt and did well.  Started on gDMT and discharged home on  5 with plans to follow up in HTS clinic for ongoing transplant evaluation at another facility.  He says that about a few days ago he started to notice LE swelling.  This turned into Nelson and orthopnea.  He says he can't walk to the bathroom without getting SOB.  He also complains of weight gain.  He takes torsemide 20mg BID at home and was told to trial additional Lasix  which he did without any improvement.  He was rx metolazone but has not been filled.  He came to the ED     In the ED he was AF with stable VS on RA.  CBC showing chronic anemia.  CMP notable for acute on chronic CKD with baseline around 2.1 and Cr now 2.6.  BNP elevated.  Lactate neg.  CXR showing pulmonary edema.  I evaluated the pt at the bedside.  Bedside TTE showing CVP >15.  He was subsequently admitted to the CCU for diuresis.     He was continued on his home  and was started on a lasix gtt, which he responded well to overnight (net -1700cc. He feels much better this morning as well. Our transplant coordinators have been working on insurance approval and he is now being transferred to [facility] service for transplant evaluation.     * LVAD (left ventricular assist device) present  -HeartMate 3 Implanted  12/1/2023  as DT  -HTS Primary  -Implanted by Dr. Washington  -Continue Coumadin, dosing by PharmD.  Goal INR 2.0-3.0 . Therapeutic today. Discontinue Heparin bridge   -Antiplatelets Not on ASA  -LDH is stable overall today. Will continue to monitor daily.  -Speed set at  4900   rpm, LSL 4500 rpm   -Interrogation notable for no events  -ECHO 12/11 AV does not open, IVS midline, LVEDD 6.1, with HM3 speed set to 4900 rpm  -Not listed for OHTx, declined for OHTX due to comorbidities         Procedure: Device Interrogation Including analysis of device parameters  Current Settings: Ventricular Assist Device  Review of device function is stable/unstable stable        12/21/2023    12:00 PM 12/21/2023    10:00 AM 12/21/2023     9:00 AM 12/21/2023     7:00 AM 12/21/2023     6:00 AM 12/21/2023     5:00 AM 12/21/2023     4:00 AM   TXP LVAD INTERROGATIONS   Type     HeartMate3 HeartMate3 HeartMate3   Flow 4.4 4 4 4.1 4 4.1 4.1   Speed 4900 4900 4900 4900 4900 4900 4900   PI 3.5 4.6 4.5 3.8 4.5 4.5 3.9   Power ([place]) 3.2 3.3 3.2 3.2 3.2 3.2 3.2   LSL 4500 4500 4500 4500 4500 4500 4500   Pulsatility Intermittent pulse  Intermittent pulse Intermittent pulse Intermittent pulse Intermittent pulse Intermittent pulse Intermittent pulse         Right foot pain  -Neuropathic pain to plantar portion of right foot  -Continue gabapentin 125 mg BID   -Added venlafaxine     Cachexia  -Daily caloric intake with tube feeds increased to 2160 calories, 98 g protein    Unilateral complete vocal fold paralysis  -Appreciate ENT's help  -S/P augmentation of paralyzed left vocal cord 12/18    Dysphonia  -Speech following closely  -Appreciate ENT's help. S/P augmentation of paralyzed left vocal cord 12/18    Moderate malnutrition  -RD and SLP following  -Tolerating tube feeds. Total daily caloric intake increased to 2160  -Failed MBBS 12/20, continue TF    Leukocytosis  -WBC peaked at 33k, trending down at 15k. Afebrile  -Concern for septic shock. Was also on hydrocortisone with increasing pressor requirements, now discontinued   -ID consulted, started on empiric micafungin, meropenem, and vanc, since completed  -Cultures were all -ve  -Completed course for possible pneumonia, ID signed off     Adjustment disorder with anxiety  -Continue Remeron 15 mg q HS    Depressed mood  -Psychiatry consulted for depressed mood, SI when left alone  -Recommend sitter when alone (possibly with transition to stepdown).  -Continue Mirtazapine  -Psychology also following  -Added venlafaxine 12/20         IVÁN (acute kidney injury)  -Nephrology following      Altered mental status  -multifactorial  -likely metabolic encephalopathy +/- icu delirium   -CT 12/7 negative for acute process  -Continue RRT for metabolic clearance as needed  -EEG done over the weekend no seizures detected, just generalized slowing  -provide frequent re-orientation  -Promote sleep/wake cycle   -Neurology consulted   - Much more awake, continue to encourage and motivate    Acute on chronic combined systolic and diastolic CHF, NYHA class 4  Pt with known ICM with an EF of 25% on home  presented  with decompensated HF.      -lRHC 11/14 RA 14, PA 70/35, PCWP 32, CO 4.1, CI 2.1  -Repeat RHC 11/20 RA 20, PA 60/29 (39), PCWP 29, CO 4.6, CI 2.3  -ECHO 11/21 showed EF 15-20%, mild RV dysfunction, mild MR, LVED 6.9   -Home  5   -Home GDMT: Entreso 24/26 BID (on hold 2/2 IVÁN), hydralazine 25 q 8hr, all held prior to LVAD implant  -Current GDMT: Continue Hyd/Isordil, Farxiga. Will transition Valsartan to Entresto and add Aldactone  -Home diuretic: Lasix 40 mg BID   -IABP inserted 11/21 and underwent DT HM3 implant 12/1, chest closed 12/4  -LVAD speed increased to 4900 rpm on 12/7   -ECHO 12/11 AV does not open, IVS midline, LVEDD 6.1, with HM3 speed set to 4900 rpm  -Increased pressor requirements with Giapreza, Ketty, epi, and vaso 12/11. Able to wean off giapreza overnight 12/11. He has been weaned off all pressors and inotropes  -CVP 9, SVO2 55%, CO 7.8, CI 3.8,   -No significant hypotension. Remains on Midodrine 15 TID      PAF (paroxysmal atrial fibrillation)  Known hx of pAF. In sinus rhythm on admission, but 1 run of AF RVR overnight. He spontaneously converted.  - Continue home amiodarone 400mg qd  - Continue AC      Acute renal failure with acute tubular necrosis superimposed on stage 3b chronic kidney disease  IVÁN on CKD2. Baseline Cr of 1.7-2.0.   - Hold ARNi  -Trend BMPs daily   -Nephrology following   -SLED/SCUF for volume removal began on 12/1. He is anuric  -Did not respond to diuretic challenge with 240 mg IV Lasix, 1000 mg Diuril, and 500 mg IV Diamox done 12/14  -Plan for HD trial today   -IR consulted for tunneled trialysis line (current RIJ trialysis 9 days old)     Type 2 diabetes mellitus without complication, without long-term current use of insulin  -A1c 7.6, not insulin dependent  - Endocrine following, appreciate assistance with blood glucose management    CAD (coronary artery disease)  -S/p 3vCABG in 2009  -Lipitor on hold 2/2 mild transaminitis  -Not on ASA post VAD    Ventricular  tachycardia  Hx VT s/p ICD placement (medtronic 2009)  - Continue home amio 400mg qd        Fany Abbasi, NP [id number]  Heart Transplant  [facility] - Cardiology Stepdown

## 2023-12-24 NOTE — CARE UPDATE
-Glucose Goal 140-180    -A1C:   Hemoglobin A1C   Date Value Ref Range Status   10/12/2023 7.6 (H) 4.0 - 5.6 % Final     Comment:     ADA Screening Guidelines:  5.7-6.4%  Consistent with prediabetes  >or=6.5%  Consistent with diabetes    High levels of fetal hemoglobin interfere with the HbA1C  assay. Heterozygous hemoglobin variants (HbS, HgC, etc)do  not significantly interfere with this assay.   However, presence of multiple variants may affect accuracy.           -HOME REGIMEN: metformin     -GLUCOSE TREND FOR THE PAST 24HRS:   Recent Labs   Lab 12/23/23  1158 12/23/23  1619 12/23/23  1938 12/23/23  2309 12/24/23  0437 12/24/23  0741   POCTGLUCOSE 181* 128* 152* 184* 207* 181*         -NO HYPOGYCEMIAS NOTED     - Diet  Diet NPO Except for: Medication (per NG tube)    -Steroids - none     -Tube Feeds - TF @ 45ml/hr        Plan:   - Continue Novolog 5 units q 4 hr to cover TF- hold if TF held or BG < 100  - POCT Glucose every 4 hours  - Hypoglycemia protocol in place      ** Please notify Endocrine for any change and/or advance in diet**  ** Please call Endocrine for any BG related issues **     Discharge Planning:   TBD. Please notify endocrinology prior to discharge.

## 2023-12-24 NOTE — PLAN OF CARE
Problem: Physical Therapy  Goal: Physical Therapy Goal  Description: Goals to be met by: 23     Patient will increase functional independence with mobility by performin. Sit to stand transfer with modified independence  2. Bed to chair transfer with supervision  3. Gait  x 150 feet with supervision using physician approved AD with standing rest breaks prn.   4. Lower extremity exercise program x30 reps per handout, with independence  5. Recite 3/3 sternal precautions and remain complaint with precautions throughout session with no verbal cues      Outcome: Ongoing, Progressing   Goals remain appropriate. 2023

## 2023-12-24 NOTE — PT/OT/SLP PROGRESS
Physical Therapy  Co-Treatment with OT    Patient Name:  Radha Abbott   MRN:  [id number]    Recommendations:     Discharge Recommendations: High Intensity Therapy  Discharge Equipment Recommendations: bedside commode (B platform RW)  Barriers to discharge: Decreased caregiver support    Assessment:     Radha Abbott is a 63 y.o. male admitted with a medical diagnosis of LVAD (left ventricular assist device) present.  He presents with the following impairments/functional limitations: weakness, gait instability, impaired endurance, impaired functional mobility, decreased safety awareness, decreased lower extremity function pt tolerated treatment better by being able to gait train farther. Pt will benefit from cont skilled PT 6x/wk to progress physically.  Pt is significantly below previous functional level, increased risk of falls and increased burden of care currently. Patient has demonstrated sufficient progression to warrant high intensity therapy evidenced by objectives noted below. Pt is s/p LVAD placement 12/1 with closure 12/4/23.      Rehab Prognosis: Good; patient would benefit from acute skilled PT services to address these deficits and reach maximum level of function.    Recent Surgery: Procedure(s) (LRB):  CLOSURE, WOUND, STERNUM (N/A)  INSERTION, GRAFT, PERICARDIUM  DRAINAGE, PLEURAL EFFUSION 20 Days Post-Op    Plan:     During this hospitalization, patient to be seen 6 x/week to address the identified rehab impairments via gait training, therapeutic activities and progress toward the following goals:    Plan of Care Expires:  01/04/24    Subjective     Chief Complaint: pt had no complaints during treatment.   Patient/Family Comments/goals: to return to previous functional level.   Pain/Comfort:  Pain Rating 1: 0/10  Pain Rating Post-Intervention 1: 0/10      Objective:     Communicated with nurse  prior to session.  Patient found supine with NG tube, LVAD, PICC line, telemetry upon PT entry to room.      General Precautions: Standard, fall, LVAD, sternal  Orthopedic Precautions: N/A  Braces: N/A  Respiratory Status: Room air     Functional Mobility:  Bed Mobility:   pt needed verbal cues for functional mobility with sternal precautions.   Rolling Left:  contact guard assistance  Rolling Right: contact guard assistance  Supine to Sit: moderate assistance  Sit to Supine: contact guard assistance    Transfers:     Sit to Stand:  minimum assistance and of 2 persons with  B platform RW from bed and mod assist x 2 sit to stand from bedside chair with B platform RW. Pt stood from chair x 2 reps.   Chair to Bed transfer: mod assist x 2 squat pivot transfer. Pt sat in chair x 2 hours.     Gait: pt received gait training ~ 4 ft then 8 ft, (12 ft total) with min assist B Platform RW and followed by chair. Pt had sitting rest period between distance gait trained.     Balance: pt sat on EOB with  x 10 min with supervision performing ADLS and VAD education with OT.     Due to pt complex medical condition, the skill of 2 licensed therapists is needed to maximize treatment session and progression towards goals  Pt white board updated with current therapists name and level of mobility assistance needed.         AM-PAC 6 CLICK MOBILITY  Turning over in bed (including adjusting bedclothes, sheets and blankets)?: 2  Sitting down on and standing up from a chair with arms (e.g., wheelchair, bedside commode, etc.): 2  Moving from lying on back to sitting on the side of the bed?: 2  Moving to and from a bed to a chair (including a wheelchair)?: 2  Need to walk in hospital room?: 2  Climbing 3-5 steps with a railing?: 1  Basic Mobility Total Score: 11       Treatment & Education:  Pt and wife received verbal instructions in PT POC and both verbally expressed understanding of such.     Patient left supine with all lines intact, call button in reach, and wife  present..    GOALS:   Multidisciplinary Problems       Physical Therapy Goals           Problem: Physical Therapy    Goal Priority Disciplines Outcome Goal Variances Interventions   Physical Therapy Goal     PT, PT/OT Ongoing, Progressing     Description: Goals to be met by: 23     Patient will increase functional independence with mobility by performin. Sit to stand transfer with modified independence  2. Bed to chair transfer with supervision  3. Gait  x 150 feet with supervision using physician approved AD with standing rest breaks prn.   4. Lower extremity exercise program x30 reps per handout, with independence  5. Recite 3/3 sternal precautions and remain complaint with precautions throughout session with no verbal cues                     Multidisciplinary Problems (Resolved)          Problem: Physical Therapy    Goal Priority Disciplines Outcome Goal Variances Interventions   Physical Therapy Goal   (Resolved)     PT, PT/OT Met     Description: Goals to be met by: 23     Patient will increase functional independence with mobility by performin. Evaluate pt's need for physical therapy.                          Time Tracking:     PT Received On: 23  PT Start Time: 0807     PT Stop Time: 0835  PT Total Time (min): 28 min     Billable Minutes: Gait Training 10 min  and Therapeutic Activity 18 min        PT/PTA: PT     Number of PTA visits since last PT visit: 0     2023

## 2023-12-24 NOTE — PLAN OF CARE
Problem: Adult Inpatient Plan of Care  Goal: Patient-Specific Goal (Individualized)  Outcome: Ongoing, Progressing  Flowsheets (Taken 12/24/2023 1022)  Anxieties, Fears or Concerns: Wants to go home  Individualized Care Needs:   Encourage SLP excercies provided by SLP team. Encourage bed excercises. Encourage review of LVAD book. Perform CVP daily with am labs. LVAD dressing to be done Q 3 dayswith silverlon dressing; due 12/24. Maintain BG protocol.

## 2023-12-24 NOTE — SUBJECTIVE & OBJECTIVE
Interval History: No complaints or overnight events. HD yesterday with plan to repeat on Tuesday. Sacral ulcer slowly improving.     Continuous Infusions:   sodium chloride 0.9% 0 mL/hr (12/17/23 2300)    sodium chloride 0.9%      dextrose 10 % in water (D10W)       Scheduled Meds:   acetaminophen  1,000 mg Per NG tube TID    amiodarone  400 mg Per NG tube Daily    balsam peru-castor oiL   Topical (Top) BID    gabapentin  125 mg Per NG tube Q12H    insulin aspart U-100  5 Units Subcutaneous 6 times per day    midodrine  15 mg Per NG tube TID    mirtazapine  15 mg Per NG tube Nightly    pantoprazole  40 mg Intravenous Daily    polyethylene glycol  17 g Per NG tube Daily    psyllium husk (aspartame)  1 packet Per NG tube Daily    senna-docusate 8.6-50 mg  2 tablet Per NG tube Daily    venlafaxine  37.5 mg Per G Tube Daily    warfarin  2.5 mg Per G Tube Daily     PRN Meds:0.9%  NaCl infusion (for blood administration), albuterol sulfate, bisacodyL, dextrose 10 % in water (D10W), dextrose 10%, dextrose 10%, glucagon (human recombinant), insulin aspart U-100, melatonin, oxyCODONE, Flushing PICC/Midline Protocol **AND** [DISCONTINUED] sodium chloride 0.9% **AND** sodium chloride 0.9%    Review of patient's allergies indicates:  No Known Allergies  Objective:     Vital Signs (Most Recent):  Temp: 98.2 °F (36.8 °C) (12/24/23 0741)  Pulse: 94 (12/24/23 0741)  Resp: 16 (12/24/23 0741)  BP: 100/62 (12/24/23 0741)  SpO2:  (unable to get reading) (12/24/23 0741) Vital Signs (24h Range):  Temp:  [97.1 °F (36.2 °C)-98.2 °F (36.8 °C)] 98.2 °F (36.8 °C)  Pulse:  [] 94  Resp:  [16-18] 16  SpO2:  [91 %-99 %] 99 %  BP: ()/(0-89) 100/62     No data found.  Body mass index is 24.55 kg/m².      Intake/Output Summary (Last 24 hours) at 12/24/2023 0832  Last data filed at 12/23/2023 2040  Gross per 24 hour   Intake 380 ml   Output 2390 ml   Net -2010 ml       Hemodynamic Parameters:  CVP:  [7 mmHg] 7 mmHg    Telemetry: SR        Physical Exam  Constitutional:       Comments: Cachectic, temporal wasting   HENT:      Head: Normocephalic and atraumatic.   Eyes:      Conjunctiva/sclera: Conjunctivae normal.      Pupils: Pupils are equal, round, and reactive to light.   Neck:      Comments: Do not appreciate elevated JVP  Cardiovascular:      Rate and Rhythm: Normal rate and regular rhythm.      Comments: Smooth VAD hum  Pulmonary:      Effort: Pulmonary effort is normal.      Breath sounds: Normal breath sounds.   Abdominal:      General: Bowel sounds are normal.      Palpations: Abdomen is soft.   Musculoskeletal:         General: No swelling. Normal range of motion.      Cervical back: Normal range of motion and neck supple.   Skin:     General: Skin is warm and dry.      Capillary Refill: Capillary refill takes 2 to 3 seconds.   Neurological:      General: No focal deficit present.      Mental Status: He is alert and oriented to person, place, and time.            Significant Labs:  CBC:  Recent Labs   Lab 12/22/23  0410 12/23/23  0440 12/24/23  0524   WBC 13.20* 11.56 11.88   RBC 2.59* 2.62* 2.58*   HGB 7.5* 7.6* 7.5*   HCT 22.8* 24.3* 24.4*    457* 467*   MCV 88 93 95   MCH 29.0 29.0 29.1   MCHC 32.9 31.3* 30.7*     BNP:  Recent Labs   Lab 12/18/23  0417 12/20/23  0258 12/22/23  0410   BNP 1,484* 1,130* 1,628*     CMP:  Recent Labs   Lab 12/22/23  0410 12/23/23  0440 12/23/23  0636 12/24/23  0524   GLU 86  --  105 182*   CALCIUM 8.0*  --  8.3* 8.5*   ALBUMIN 1.8*  1.8* 1.9*  --  1.9*   PROT 6.4  6.4 6.6  --  6.6     --  140 139   K 3.7  --  3.8 4.3   CO2 26  --  26 22*     --  100 107   BUN 35*  --  57* 37*   CREATININE 2.6*  --  4.3* 3.1*   ALKPHOS 215*  215* 215*  --  201*   ALT 28  28 18  --  15   AST 50*  50* 48*  --  55*   BILITOT 0.7  0.7 0.5  --  0.5      Coagulation:   Recent Labs   Lab 12/18/23  0417 12/19/23  0419 12/20/23  0258 12/21/23  0352 12/22/23  0410 12/23/23  0440 12/24/23  0524   INR 1.9* 1.8*  2.0*   < > 2.9* 2.9* 3.3*   APTT 48.3* 46.2* 47.0*  --   --   --   --     < > = values in this interval not displayed.     LDH:  Recent Labs   Lab 12/22/23  0410 12/23/23  0440 12/24/23  0524   * 451* 437*     Microbiology:  Microbiology Results (last 7 days)       Procedure Component Value Units Date/Time    Fungus culture [[id number]] Collected: 12/06/23 1532    Order Status: Completed Specimen: Body Fluid from Lung, Right Updated: 12/20/23 0949     Fungus (Mycology) Culture Culture in progress      No fungus isolated after 2 weeks    Narrative:      Bronchial Wash            I have reviewed all pertinent labs within the past 24 hours.    Estimated Creatinine Clearance: 27.5 mL/min (A) (based on SCr of 3.1 mg/dL (H)).    Diagnostic Results:  I have reviewed and interpreted all pertinent imaging results/findings within the past 24 hours.

## 2023-12-24 NOTE — NURSING
Pt has 9/10 pain. PRN pain meds available 4-6/10 p[ain. HTS provider MD Ac notified for order for 9/10 prn pain meds.

## 2023-12-24 NOTE — PROGRESS NOTES
12/24/2023  [place]    Current provider:  Juventino Bermudez Jr.*    Device interrogation:      12/24/2023    12:57 PM 12/24/2023     7:42 AM 12/23/2023    11:00 PM 12/23/2023     7:30 PM 12/23/2023     3:21 PM 12/23/2023    12:09 PM 12/23/2023     7:51 AM   TXP LVAD INTERROGATIONS   Type HeartMate3 HeartMate3 HeartMate3 HeartMate3 HeartMate3 HeartMate3 HeartMate3   Flow 3.7 4.9 3.5 3.6 3.4 3.7 3.9   Speed 4900 4900 4900 4900 4900 4900 4900   PI 6.6 4.5 6.6 6 7.1 6.1 4.6   Power ([place]) 3.3 3.3 3.4 3.3 3.3 3.3 3.4   LSL  4500 4500 4500   4500   Pulsatility  Intermittent pulse Intermittent pulse Intermittent pulse   Intermittent pulse          Rounded on [facility] to ensure all mechanical assist device settings (IABP or VAD) were appropriate and all parameters were within limits.  I was able to ensure all back up equipment was present, the staff had no issues, and the Perfusion Department daily rounding was complete.      For implantable VADs: Interrogation of Ventricular assist device was performed with analysis of device parameters and review of device function. I have personally reviewed the interrogation findings and agree with findings as stated.     In emergency, the nursing units have been notified to contact the perfusion department either by:  Calling [id number] from 630am to 4pm Mon thru Fri, utilizing the On-Call Finder functionality of Epic and searching for Perfusion, or by contacting the hospital  from 4pm to 630am and on weekends and asking to speak with the perfusionist on call.    2:18 PM

## 2023-12-24 NOTE — PT/OT/SLP PROGRESS
Occupational Therapy   Co-Treatment with Physical Therapy    Name: Radha Abbott  MRN: [id number]  Admitting Diagnosis:  LVAD (left ventricular assist device) present  20 Days Post-Op  Procedure(s):  CLOSURE, WOUND, STERNUM  INSERTION, GRAFT, PERICARDIUM  DRAINAGE, PLEURAL EFFUSION     Recommendations:     Discharge Recommendations: High Intensity Therapy  Discharge Equipment Recommendations:   (still assessing DME needs)  Barriers to discharge:  None    Assessment:     Radha Abbott is a 63 y.o. male with a medical diagnosis of LVAD (left ventricular assist device) present.  He presents with the following performance deficits affecting function are weakness, impaired endurance, impaired self care skills, impaired functional mobility, impaired cardiopulmonary response to activity, decreased upper extremity function, decreased lower extremity function, decreased safety awareness, gait instability, impaired balance, impaired fine motor. Patient progressing steadily well with OT intervention. Patient has demonstrated sufficient progression to warrant high intensity therapy evidenced by objectives noted below.    Rehab Prognosis:  Good; patient would benefit from acute skilled OT services to address these deficits and reach maximum level of function.       Plan:     Patient to be seen 6 x/week to address the above listed problems via self-care/home management, therapeutic activities, therapeutic exercises, neuromuscular re-education  Plan of Care Expires: 01/02/24  Plan of Care Reviewed with: patient, spouse    Subjective     Chief Complaint: none stated; motivated today  Patient/Family Comments/goals: spouse provided encouragement throughout therapy  Pain/Comfort:  Pain Rating 1: 0/10  Pain Rating Post-Intervention 1: 0/10    Objective:     Communicated with: nurse freda and OTR prior to session.  Patient found HOB elevated with telemetry, NG tube, LVAD upon OT entry to room.  A client care conference was completed by the  OTR and the MCDANIEL prior to treatment by the MCDANIEL to discuss the patient's POC and current status.    General Precautions: Standard, fall, LVAD, sternal    Orthopedic Precautions:N/A  Braces: N/A  Respiratory Status: Room air     Occupational Performance:     Bed Mobility:    Patient completed Supine to Sit with moderate assistance and min cues for sequencing and adherence to sternal pxns      Functional Mobility/Transfers:  Patient completed Sit <> Stand Transfer with minimum assistance and of 2 persons  with B platform walker from EOB  2nd trial from chair: Mod(A) of 2 persons using PRW  Patient completed Bed <> Chair Transfer using Step Transfer technique with moderate assistance and of 2 persons with B platform walker  Functional Mobility: within room 4' +8' using B platform RW with Min(A) + chair follow    Activities of Daily Living:  Grooming: contact guard assistance oral hygiene seated EOB; increased time for decreased dexterity   LVAD:  Pt completed self-test and recorded numbers in binders with mod verbal cues  Pt able to name the driveline, controller and power cables with min cues  re-education required on naming of driveline    LVAD remaining on wall power at end of session       [facility] 6 Click ADL: 11    Treatment & Education:  Patient educated on OT POC, goals, current progress, and importance of progressive mobility with nursing (A). Patient required min cues for adherence to sternal precautions throughout therapy. Addressed all patient questions/concerns within MCDANIEL scope of practice. Co-treatment performed with PT due to patient's complexity and benefit of 2 skilled therapists to facilitate functional and safe occupational performance, accommodate patient's activity tolerance, and maximize patient's participation in therapy.     Patient left up in chair with all lines intact, call button in reach, nurse notified, and wife present    GOALS:   Multidisciplinary Problems       Occupational Therapy Goals           Problem: Occupational Therapy    Goal Priority Disciplines Outcome Interventions   Occupational Therapy Goal     OT, PT/OT Ongoing, Progressing    Description: Goals to be met by: 1/2/24     Patient will increase functional independence with ADLs by performing:    UB Dressing with SBA  LE Dressing with Supervision.  Grooming while standing at sink with Supervision.  Toileting from bedside commode with SBA for hygiene and clothing management.   Step transfer to bedside commode with SBA  [employer] with VAD management during ADLs                             Time Tracking:     OT Date of Treatment: 12/24/23  OT Start Time: 0807  OT Stop Time: 0835  OT Total Time (min): 28 min    Billable Minutes:Self Care/Home Management 28    OT/PRIETO: PRIETO     Number of PRIETO visits since last OT visit: 1 12/24/2023

## 2023-12-25 LAB
ALBUMIN SERPL BCP-MCNC: 1.9 G/DL (ref 3.5–5.2)
ALP SERPL-CCNC: 193 U/L (ref 55–135)
ALT SERPL W/O P-5'-P-CCNC: 11 U/L (ref 10–44)
ANION GAP SERPL CALC-SCNC: 13 MMOL/L (ref 8–16)
AST SERPL-CCNC: 53 U/L (ref 10–40)
BASOPHILS # BLD AUTO: 0.08 K/UL (ref 0–0.2)
BASOPHILS NFR BLD: 0.7 % (ref 0–1.9)
BILIRUB DIRECT SERPL-MCNC: 0.3 MG/DL (ref 0.1–0.3)
BILIRUB SERPL-MCNC: 0.5 MG/DL (ref 0.1–1)
BNP SERPL-MCNC: 2604 PG/ML (ref 0–99)
BUN SERPL-MCNC: 63 MG/DL (ref 8–23)
CA-I BLDV-SCNC: 1.17 MMOL/L (ref 1.06–1.42)
CALCIUM SERPL-MCNC: 8.5 MG/DL (ref 8.7–10.5)
CHLORIDE SERPL-SCNC: 105 MMOL/L (ref 95–110)
CO2 SERPL-SCNC: 20 MMOL/L (ref 23–29)
CREAT SERPL-MCNC: 4.4 MG/DL (ref 0.5–1.4)
CRP SERPL-MCNC: 81.9 MG/L (ref 0–8.2)
DIFFERENTIAL METHOD BLD: ABNORMAL
EOSINOPHIL # BLD AUTO: 0.1 K/UL (ref 0–0.5)
EOSINOPHIL NFR BLD: 0.9 % (ref 0–8)
ERYTHROCYTE [DISTWIDTH] IN BLOOD BY AUTOMATED COUNT: 19.4 % (ref 11.5–14.5)
EST. GFR  (NO RACE VARIABLE): 14.5 ML/MIN/1.73 M^2
FERRITIN SERPL-MCNC: 2882 NG/ML (ref 20–300)
GLUCOSE SERPL-MCNC: 109 MG/DL (ref 70–110)
HCT VFR BLD AUTO: 23.8 % (ref 40–54)
HGB BLD-MCNC: 7.3 G/DL (ref 14–18)
IMM GRANULOCYTES # BLD AUTO: 0.05 K/UL (ref 0–0.04)
IMM GRANULOCYTES NFR BLD AUTO: 0.4 % (ref 0–0.5)
INR PPP: 3.3 (ref 0.8–1.2)
IRON SERPL-MCNC: 22 UG/DL (ref 45–160)
LDH SERPL L TO P-CCNC: 449 U/L (ref 110–260)
LYMPHOCYTES # BLD AUTO: 0.9 K/UL (ref 1–4.8)
LYMPHOCYTES NFR BLD: 7.9 % (ref 18–48)
MAGNESIUM SERPL-MCNC: 2.2 MG/DL (ref 1.6–2.6)
MCH RBC QN AUTO: 28.9 PG (ref 27–31)
MCHC RBC AUTO-ENTMCNC: 30.7 G/DL (ref 32–36)
MCV RBC AUTO: 94 FL (ref 82–98)
MONOCYTES # BLD AUTO: 0.6 K/UL (ref 0.3–1)
MONOCYTES NFR BLD: 5.4 % (ref 4–15)
NEUTROPHILS # BLD AUTO: 9.7 K/UL (ref 1.8–7.7)
NEUTROPHILS NFR BLD: 84.7 % (ref 38–73)
NRBC BLD-RTO: 0 /100 WBC
PHOSPHATE SERPL-MCNC: 3.4 MG/DL (ref 2.7–4.5)
PLATELET # BLD AUTO: 479 K/UL (ref 150–450)
PMV BLD AUTO: 10.5 FL (ref 9.2–12.9)
POCT GLUCOSE: 132 MG/DL (ref 70–110)
POCT GLUCOSE: 132 MG/DL (ref 70–110)
POCT GLUCOSE: 143 MG/DL (ref 70–110)
POCT GLUCOSE: 185 MG/DL (ref 70–110)
POCT GLUCOSE: 187 MG/DL (ref 70–110)
POTASSIUM SERPL-SCNC: 4.7 MMOL/L (ref 3.5–5.1)
PREALB SERPL-MCNC: 21 MG/DL (ref 20–43)
PROT SERPL-MCNC: 6.8 G/DL (ref 6–8.4)
PROTHROMBIN TIME: 32.9 SEC (ref 9–12.5)
RBC # BLD AUTO: 2.53 M/UL (ref 4.6–6.2)
SATURATED IRON: 9 % (ref 20–50)
SODIUM SERPL-SCNC: 138 MMOL/L (ref 136–145)
TOTAL IRON BINDING CAPACITY: 244 UG/DL (ref 250–450)
TRANSFERRIN SERPL-MCNC: 165 MG/DL (ref 200–375)
WBC # BLD AUTO: 11.4 K/UL (ref 3.9–12.7)

## 2023-12-25 PROCEDURE — 83735 ASSAY OF MAGNESIUM: CPT | Performed by: INTERNAL MEDICINE

## 2023-12-25 PROCEDURE — 94664 DEMO&/EVAL PT USE INHALER: CPT

## 2023-12-25 PROCEDURE — C9113 INJ PANTOPRAZOLE SODIUM, VIA: HCPCS | Performed by: PHYSICIAN ASSISTANT

## 2023-12-25 PROCEDURE — 25000003 PHARM REV CODE 250: Performed by: INTERNAL MEDICINE

## 2023-12-25 PROCEDURE — 83615 LACTATE (LD) (LDH) ENZYME: CPT | Performed by: STUDENT IN AN ORGANIZED HEALTH CARE EDUCATION/TRAINING PROGRAM

## 2023-12-25 PROCEDURE — 63600175 PHARM REV CODE 636 W HCPCS: Performed by: STUDENT IN AN ORGANIZED HEALTH CARE EDUCATION/TRAINING PROGRAM

## 2023-12-25 PROCEDURE — 25000003 PHARM REV CODE 250: Performed by: STUDENT IN AN ORGANIZED HEALTH CARE EDUCATION/TRAINING PROGRAM

## 2023-12-25 PROCEDURE — 63600175 PHARM REV CODE 636 W HCPCS: Performed by: PHYSICIAN ASSISTANT

## 2023-12-25 PROCEDURE — 83540 ASSAY OF IRON: CPT | Performed by: INTERNAL MEDICINE

## 2023-12-25 PROCEDURE — 84134 ASSAY OF PREALBUMIN: CPT | Performed by: NURSE PRACTITIONER

## 2023-12-25 PROCEDURE — 93750 INTERROGATION VAD IN PERSON: CPT | Mod: ,,, | Performed by: INTERNAL MEDICINE

## 2023-12-25 PROCEDURE — 94761 N-INVAS EAR/PLS OXIMETRY MLT: CPT

## 2023-12-25 PROCEDURE — 25000003 PHARM REV CODE 250: Performed by: PHYSICIAN ASSISTANT

## 2023-12-25 PROCEDURE — 80048 BASIC METABOLIC PNL TOTAL CA: CPT | Performed by: NURSE PRACTITIONER

## 2023-12-25 PROCEDURE — 82330 ASSAY OF CALCIUM: CPT | Performed by: STUDENT IN AN ORGANIZED HEALTH CARE EDUCATION/TRAINING PROGRAM

## 2023-12-25 PROCEDURE — 83880 ASSAY OF NATRIURETIC PEPTIDE: CPT | Performed by: STUDENT IN AN ORGANIZED HEALTH CARE EDUCATION/TRAINING PROGRAM

## 2023-12-25 PROCEDURE — 82728 ASSAY OF FERRITIN: CPT | Performed by: INTERNAL MEDICINE

## 2023-12-25 PROCEDURE — 85025 COMPLETE CBC W/AUTO DIFF WBC: CPT | Performed by: INTERNAL MEDICINE

## 2023-12-25 PROCEDURE — 86140 C-REACTIVE PROTEIN: CPT | Performed by: STUDENT IN AN ORGANIZED HEALTH CARE EDUCATION/TRAINING PROGRAM

## 2023-12-25 PROCEDURE — 84100 ASSAY OF PHOSPHORUS: CPT | Performed by: INTERNAL MEDICINE

## 2023-12-25 PROCEDURE — 99900035 HC TECH TIME PER 15 MIN (STAT)

## 2023-12-25 PROCEDURE — 20600001 HC STEP DOWN PRIVATE ROOM

## 2023-12-25 PROCEDURE — 25000003 PHARM REV CODE 250

## 2023-12-25 PROCEDURE — 25000003 PHARM REV CODE 250: Performed by: NURSE PRACTITIONER

## 2023-12-25 PROCEDURE — 85610 PROTHROMBIN TIME: CPT | Performed by: INTERNAL MEDICINE

## 2023-12-25 PROCEDURE — 27000248 HC VAD-ADDITIONAL DAY

## 2023-12-25 PROCEDURE — 99233 SBSQ HOSP IP/OBS HIGH 50: CPT | Mod: ,,, | Performed by: NURSE PRACTITIONER

## 2023-12-25 PROCEDURE — 80076 HEPATIC FUNCTION PANEL: CPT | Performed by: INTERNAL MEDICINE

## 2023-12-25 RX ORDER — ZOLPIDEM TARTRATE 5 MG/1
5 TABLET ORAL ONCE
Status: COMPLETED | OUTPATIENT
Start: 2023-12-25 | End: 2023-12-25

## 2023-12-25 RX ORDER — HYDRALAZINE HYDROCHLORIDE 20 MG/ML
10 INJECTION INTRAMUSCULAR; INTRAVENOUS EVERY 6 HOURS PRN
Status: DISCONTINUED | OUTPATIENT
Start: 2023-12-25 | End: 2024-01-24 | Stop reason: HOSPADM

## 2023-12-25 RX ORDER — WARFARIN 2.5 MG/1
2.5 TABLET ORAL DAILY
Status: DISCONTINUED | OUTPATIENT
Start: 2023-12-26 | End: 2023-12-27

## 2023-12-25 RX ORDER — INSULIN ASPART 100 [IU]/ML
4 INJECTION, SOLUTION INTRAVENOUS; SUBCUTANEOUS
Status: DISCONTINUED | OUTPATIENT
Start: 2023-12-25 | End: 2024-01-04

## 2023-12-25 RX ORDER — MIDODRINE HYDROCHLORIDE 5 MG/1
10 TABLET ORAL 3 TIMES DAILY
Status: DISCONTINUED | OUTPATIENT
Start: 2023-12-25 | End: 2023-12-27

## 2023-12-25 RX ADMIN — Medication: at 08:12

## 2023-12-25 RX ADMIN — HYDRALAZINE HYDROCHLORIDE 10 MG: 20 INJECTION, SOLUTION INTRAMUSCULAR; INTRAVENOUS at 02:12

## 2023-12-25 RX ADMIN — INSULIN ASPART 4 UNITS: 100 INJECTION, SOLUTION INTRAVENOUS; SUBCUTANEOUS at 04:12

## 2023-12-25 RX ADMIN — POLYETHYLENE GLYCOL 3350 17 G: 17 POWDER, FOR SOLUTION ORAL at 09:12

## 2023-12-25 RX ADMIN — MIDODRINE HYDROCHLORIDE 10 MG: 5 TABLET ORAL at 08:12

## 2023-12-25 RX ADMIN — ZOLPIDEM TARTRATE 5 MG: 5 TABLET ORAL at 11:12

## 2023-12-25 RX ADMIN — SENNOSIDES AND DOCUSATE SODIUM 2 TABLET: 8.6; 5 TABLET ORAL at 09:12

## 2023-12-25 RX ADMIN — ACETAMINOPHEN 1000 MG: 500 TABLET ORAL at 08:12

## 2023-12-25 RX ADMIN — VENLAFAXINE 37.5 MG: 37.5 TABLET ORAL at 09:12

## 2023-12-25 RX ADMIN — PANTOPRAZOLE SODIUM 40 MG: 40 INJECTION, POWDER, FOR SOLUTION INTRAVENOUS at 09:12

## 2023-12-25 RX ADMIN — ACETAMINOPHEN 1000 MG: 500 TABLET ORAL at 03:12

## 2023-12-25 RX ADMIN — INSULIN ASPART 2 UNITS: 100 INJECTION, SOLUTION INTRAVENOUS; SUBCUTANEOUS at 11:12

## 2023-12-25 RX ADMIN — INSULIN ASPART 4 UNITS: 100 INJECTION, SOLUTION INTRAVENOUS; SUBCUTANEOUS at 12:12

## 2023-12-25 RX ADMIN — MIRTAZAPINE 15 MG: 15 TABLET, FILM COATED ORAL at 08:12

## 2023-12-25 RX ADMIN — ACETAMINOPHEN 1000 MG: 500 TABLET ORAL at 09:12

## 2023-12-25 RX ADMIN — GABAPENTIN 125 MG: 250 SOLUTION ORAL at 08:12

## 2023-12-25 RX ADMIN — TRAMADOL HYDROCHLORIDE 50 MG: 50 TABLET, COATED ORAL at 08:12

## 2023-12-25 RX ADMIN — MIDODRINE HYDROCHLORIDE 10 MG: 5 TABLET ORAL at 05:12

## 2023-12-25 RX ADMIN — Medication: at 09:12

## 2023-12-25 RX ADMIN — INSULIN ASPART 2 UNITS: 100 INJECTION, SOLUTION INTRAVENOUS; SUBCUTANEOUS at 12:12

## 2023-12-25 RX ADMIN — GABAPENTIN 125 MG: 250 SOLUTION ORAL at 09:12

## 2023-12-25 RX ADMIN — INSULIN ASPART 4 UNITS: 100 INJECTION, SOLUTION INTRAVENOUS; SUBCUTANEOUS at 11:12

## 2023-12-25 RX ADMIN — INSULIN ASPART 5 UNITS: 100 INJECTION, SOLUTION INTRAVENOUS; SUBCUTANEOUS at 04:12

## 2023-12-25 RX ADMIN — AMIODARONE HYDROCHLORIDE 400 MG: 200 TABLET ORAL at 09:12

## 2023-12-25 RX ADMIN — INSULIN ASPART 4 UNITS: 100 INJECTION, SOLUTION INTRAVENOUS; SUBCUTANEOUS at 08:12

## 2023-12-25 RX ADMIN — CALCIUM CHLORIDE 2 G: 100 INJECTION, SOLUTION INTRAVENOUS at 11:12

## 2023-12-25 RX ADMIN — MIDODRINE HYDROCHLORIDE 15 MG: 5 TABLET ORAL at 09:12

## 2023-12-25 NOTE — ASSESSMENT & PLAN NOTE
IVÁN on CKD2. Baseline Cr of 1.7-2.0.   - Hold ARNi  -Trend BMPs daily   -Nephrology following   -SLED/SCUF for volume removal began on 12/1. He is anuric  -Did not respond to diuretic challenge with 240 mg IV Lasix, 1000 mg Diuril, and 500 mg IV Diamox done 12/14  -Now on HD  -Tunneled trialysis line placed 12/22 by IR

## 2023-12-25 NOTE — PROCEDURES
Interrogation of Ventricular assist device was performed with physician analysis of device parameters and review of device function. I have personally reviewed the interrogation findings and agree with findings as stated.   Procedure: Device Interrogation Including analysis of device parameters  Current Settings: Ventricular Assist Device  Review of device function is stable      12/25/2023     7:23 AM 12/25/2023     4:00 AM 12/24/2023    11:00 PM 12/24/2023     7:40 PM 12/24/2023     3:29 PM 12/24/2023    12:57 PM 12/24/2023     7:42 AM   TXP LVAD INTERROGATIONS   Type HeartMate3 HeartMate3 HeartMate3 HeartMate3 HeartMate3 HeartMate3 HeartMate3   Flow 3.4 3.5 3.8 3.6 3.9 3.7 4.9   Speed 4900 4900 4900 4900 4900 4900 4900   PI 8.1 7 5.1 6.4 5.3 6.6 4.5   Power ([place]) 3.3 3.3 3.3 3.3 3.3 3.3 3.3   LSL 4500 4500 4500 4500 4500  4500   Pulsatility Intermittent pulse Intermittent pulse Intermittent pulse Intermittent pulse   Intermittent pulse      Brayan Ocampo MD

## 2023-12-25 NOTE — PROGRESS NOTES
12/24/23 1800        VAD 12/01/23 1015 Left ventricular assist device HeartMate 3   Placement Date/Time: 12/01/23 1015   Present Prior to Hospital Arrival?: No  VAD Type: Left ventricular assist device  VAD Brand: HeartMate 3   Site Location Abdomen right   Site Assessment Sutures intact   Driveline Exit Site 2   Driveline Assessment Free of Kinks;Intact;Modular cable Clean and Locked   Dressing Status Clean;Dry;Intact   Dressing Intervention Sterile dressing change   Performed By Caregiver w/ RN - Note Documented   Dressing Change Schedule Every 3 days   Dressing Change Due 12/27/23   Integrity dry   Driveline [place] in use [place] car[place]za   Condition CDI   Date changed 12/24/23     Pt's wife checked off on dressing changes. Mrs. Stoddard maintained sterile technique and followed appropriate steps for LVAD dressing. Size 8 gloves to be given whenever performing lvad dressing.

## 2023-12-25 NOTE — PROGRESS NOTES
12/25/23 1223   Vital Signs   Pulse 88   Heart Rate Source Monitor   Device (Oxygen Therapy) room air   BP (!) 96/0   BP Location Left arm   BP Method Doppler   Patient Position Lying     MD Nikita notified. PRN meds to be placed.     Update @1410: PRN IV Hydralazine given DPLER post Hydralazine 90/0

## 2023-12-25 NOTE — ASSESSMENT & PLAN NOTE
Pt with known ICM with an EF of 25% on home  presented with decompensated HF.      -lRHC 11/14 RA 14, PA 70/35, PCWP 32, CO 4.1, CI 2.1  -Repeat RHC 11/20 RA 20, PA 60/29 (39), PCWP 29, CO 4.6, CI 2.3  -ECHO 11/21 showed EF 15-20%, mild RV dysfunction, mild MR, LVED 6.9   -Home  5 has been weaned off  -Home GDMT: Entreso 24/26 BID (on hold 2/2 IVÁN), hydralazine 25 q 8hr, all held prior to LVAD implant  -Home diuretic: Lasix 40 mg BID   -IABP inserted 11/21 and underwent DT HM3 implant 12/1, chest closed 12/4  -LVAD speed increased to 4900 rpm on 12/7   -ECHO 12/11 AV does not open, IVS midline, LVEDD 6.1, with HM3 speed set to 4900 rpm  -Increased pressor requirements with Giapreza, Ketty, epi, and vaso 12/11. Able to wean off giapreza overnight 12/11. He has been weaned off all pressors and inotropes  -CVP 10. Next HD on Tuesday  -No significant hypotension. Remains on Midodrine 15 TID

## 2023-12-25 NOTE — CARE UPDATE
-Glucose Goal 140-180    -A1C:   Hemoglobin A1C   Date Value Ref Range Status   10/12/2023 7.6 (H) 4.0 - 5.6 % Final     Comment:     ADA Screening Guidelines:  5.7-6.4%  Consistent with prediabetes  >or=6.5%  Consistent with diabetes    High levels of fetal hemoglobin interfere with the HbA1C  assay. Heterozygous hemoglobin variants (HbS, HgC, etc)do  not significantly interfere with this assay.   However, presence of multiple variants may affect accuracy.           -HOME REGIMEN: metformin     -GLUCOSE TREND FOR THE PAST 24HRS:   Recent Labs   Lab 12/24/23  0741 12/24/23  0905 12/24/23  1259 12/24/23  1716 12/24/23  1917 12/25/23  0728   POCTGLUCOSE 181* 184* 98 196* 115* 132*           -NO HYPOGYCEMIAS NOTED     - Diet  Diet NPO Except for: Medication (per NG tube)    -Steroids - none     -Tube Feeds - TF @ 45ml/hr        Plan:   - Novolog 4 units q 4 hr to cover TF- hold if TF held or BG < 100 (25% reduction due to BG below goal)   - POCT Glucose every 4 hours  - Hypoglycemia protocol in place      ** Please notify Endocrine for any change and/or advance in diet**  ** Please call Endocrine for any BG related issues **     Discharge Planning:   TBD. Please notify endocrinology prior to discharge.

## 2023-12-25 NOTE — SUBJECTIVE & OBJECTIVE
Interval History: No complaints or overnight events. Remains anuric. CVP 10. WIll have next HD on Tuesday. Tube feeds continued and remains NPO. SLP following and plan repeat MBSS likely this week. Walked a total of 12 feet yesterday with minimal assist and platform walker. Having regular normal BM's    Continuous Infusions:   sodium chloride 0.9% 0 mL/hr (12/17/23 2300)    sodium chloride 0.9%      dextrose 10 % in water (D10W)       Scheduled Meds:   acetaminophen  1,000 mg Per NG tube TID    amiodarone  400 mg Per NG tube Daily    balsam peru-castor oiL   Topical (Top) BID    gabapentin  125 mg Per NG tube Q12H    insulin aspart U-100  4 Units Subcutaneous 6 times per day    midodrine  15 mg Per NG tube TID    mirtazapine  15 mg Per NG tube Nightly    pantoprazole  40 mg Intravenous Daily    polyethylene glycol  17 g Per NG tube Daily    psyllium husk (aspartame)  1 packet Per NG tube Daily    senna-docusate 8.6-50 mg  2 tablet Per NG tube Daily    venlafaxine  37.5 mg Per G Tube Daily    warfarin  2.5 mg Per G Tube Daily     PRN Meds:0.9%  NaCl infusion (for blood administration), albuterol sulfate, bisacodyL, dextrose 10 % in water (D10W), dextrose 10%, dextrose 10%, glucagon (human recombinant), insulin aspart U-100, melatonin, Flushing PICC/Midline Protocol **AND** [DISCONTINUED] sodium chloride 0.9% **AND** sodium chloride 0.9%, traMADoL    Review of patient's allergies indicates:  No Known Allergies  Objective:     Vital Signs (Most Recent):  Temp: 98 °F (36.7 °C) (12/25/23 0724)  Pulse: 91 (12/25/23 0724)  Resp: 18 (12/25/23 0724)  BP: 97/76 (12/25/23 0724)  SpO2: 97 % (12/25/23 0724) Vital Signs (24h Range):  Temp:  [97.6 °F (36.4 °C)-98.3 °F (36.8 °C)] 98 °F (36.7 °C)  Pulse:  [68-94] 91  Resp:  [18] 18  SpO2:  [91 %-98 %] 97 %  BP: ()/(0-77) 97/76     No data found.  Body mass index is 24.55 kg/m².      Intake/Output Summary (Last 24 hours) at 12/25/2023 0822  Last data filed at 12/24/2023  2101  Gross per 24 hour   Intake 610 ml   Output 0 ml   Net 610 ml       Hemodynamic Parameters:  CVP:  [10 mmHg] 10 mmHg    Telemetry: SR       Physical Exam  Constitutional:       Comments: Cachectic, temporal wasting   HENT:      Head: Normocephalic and atraumatic.   Eyes:      Conjunctiva/sclera: Conjunctivae normal.      Pupils: Pupils are equal, round, and reactive to light.   Neck:      Comments: Do not appreciate elevated JVP  Cardiovascular:      Rate and Rhythm: Normal rate and regular rhythm.      Comments: Smooth VAD hum  Pulmonary:      Effort: Pulmonary effort is normal.      Breath sounds: Normal breath sounds.   Abdominal:      General: Bowel sounds are normal.      Palpations: Abdomen is soft.   Musculoskeletal:         General: No swelling. Normal range of motion.      Cervical back: Normal range of motion and neck supple.   Skin:     General: Skin is warm and dry.      Capillary Refill: Capillary refill takes 2 to 3 seconds.   Neurological:      General: No focal deficit present.      Mental Status: He is alert and oriented to person, place, and time.            Significant Labs:  CBC:  Recent Labs   Lab 12/22/23  0410 12/23/23  0440 12/24/23  0524   WBC 13.20* 11.56 11.88   RBC 2.59* 2.62* 2.58*   HGB 7.5* 7.6* 7.5*   HCT 22.8* 24.3* 24.4*    457* 467*   MCV 88 93 95   MCH 29.0 29.0 29.1   MCHC 32.9 31.3* 30.7*     BNP:  Recent Labs   Lab 12/20/23  0258 12/22/23  0410   BNP 1,130* 1,628*     CMP:  Recent Labs   Lab 12/22/23  0410 12/23/23  0440 12/23/23  0636 12/24/23  0524   GLU 86  --  105 182*   CALCIUM 8.0*  --  8.3* 8.5*   ALBUMIN 1.8*  1.8* 1.9*  --  1.9*   PROT 6.4  6.4 6.6  --  6.6     --  140 139   K 3.7  --  3.8 4.3   CO2 26  --  26 22*     --  100 107   BUN 35*  --  57* 37*   CREATININE 2.6*  --  4.3* 3.1*   ALKPHOS 215*  215* 215*  --  201*   ALT 28  28 18  --  15   AST 50*  50* 48*  --  55*   BILITOT 0.7  0.7 0.5  --  0.5      Coagulation:   Recent Labs   Lab  12/19/23  0419 12/20/23  0258 12/21/23  0352 12/22/23  0410 12/23/23  0440 12/24/23  0524   INR 1.8* 2.0*   < > 2.9* 2.9* 3.3*   APTT 46.2* 47.0*  --   --   --   --     < > = values in this interval not displayed.     LDH:  Recent Labs   Lab 12/23/23  0440 12/24/23  0524   * 437*     Microbiology:  Microbiology Results (last 7 days)       Procedure Component Value Units Date/Time    Fungus culture [[id number]] Collected: 12/06/23 1532    Order Status: Completed Specimen: Body Fluid from Lung, Right Updated: 12/20/23 0949     Fungus (Mycology) Culture Culture in progress      No fungus isolated after 2 weeks    Narrative:      Bronchial Wash            I have reviewed all pertinent labs within the past 24 hours.    Estimated Creatinine Clearance: 27.5 mL/min (A) (based on SCr of 3.1 mg/dL (H)).    Diagnostic Results:  I have reviewed and interpreted all pertinent imaging results/findings within the past 24 hours.

## 2023-12-25 NOTE — PROGRESS NOTES
12/25/2023  [place]    Current provider:  Juventino Bermudez Jr.*    Device interrogation:      12/25/2023    11:00 AM 12/25/2023     7:23 AM 12/25/2023     4:00 AM 12/24/2023    11:00 PM 12/24/2023     7:40 PM 12/24/2023     3:29 PM 12/24/2023    12:57 PM   TXP LVAD INTERROGATIONS   Type HeartMate3 HeartMate3 HeartMate3 HeartMate3 HeartMate3 HeartMate3 HeartMate3   Flow 3.3 3.4 3.5 3.8 3.6 3.9 3.7   Speed 4900 4900 4900 4900 4900 4900 4900   PI 8.5 8.1 7 5.1 6.4 5.3 6.6   Power ([place]) 3.3 3.3 3.3 3.3 3.3 3.3 3.3   LSL  4500 4500 4500 4500 4500    Pulsatility  Intermittent pulse Intermittent pulse Intermittent pulse Intermittent pulse            Rounded on [facility] to ensure all mechanical assist device settings (IABP or VAD) were appropriate and all parameters were within limits.  I was able to ensure all back up equipment was present, the staff had no issues, and the Perfusion Department daily rounding was complete.      For implantable VADs: Interrogation of Ventricular assist device was performed with analysis of device parameters and review of device function. I have personally reviewed the interrogation findings and agree with findings as stated.     In emergency, the nursing units have been notified to contact the perfusion department either by:  Calling [id number] from 630am to 4pm Mon thru Fri, utilizing the On-Call Finder functionality of Epic and searching for Perfusion, or by contacting the hospital  from 4pm to 630am and on weekends and asking to speak with the perfusionist on call.    11:34 AM

## 2023-12-25 NOTE — PROGRESS NOTES
[facility] - Cardiology Stepdown  Heart Transplant  Progress Note    Patient Name: Radha Abbott  MRN: [id number]  Admission Date: 11/9/2023  Hospital Length of Stay: 46 days  Attending Physician: Juventino Bermudez Jr.*  Primary Care Provider: Vasu Kong MD  Principal Problem:LVAD (left ventricular assist device) present    Subjective:   Interval History: No complaints or overnight events. Remains anuric. CVP 10. WIll have next HD on Tuesday. Tube feeds continued and remains NPO. SLP following and plan repeat MBSS likely this week. Walked a total of 12 feet yesterday with minimal assist and platform walker. Having regular normal BM's    Continuous Infusions:   sodium chloride 0.9% 0 mL/hr (12/17/23 2300)    sodium chloride 0.9%      dextrose 10 % in water (D10W)       Scheduled Meds:   acetaminophen  1,000 mg Per NG tube TID    amiodarone  400 mg Per NG tube Daily    balsam peru-castor oiL   Topical (Top) BID    gabapentin  125 mg Per NG tube Q12H    insulin aspart U-100  4 Units Subcutaneous 6 times per day    midodrine  15 mg Per NG tube TID    mirtazapine  15 mg Per NG tube Nightly    pantoprazole  40 mg Intravenous Daily    polyethylene glycol  17 g Per NG tube Daily    psyllium husk (aspartame)  1 packet Per NG tube Daily    senna-docusate 8.6-50 mg  2 tablet Per NG tube Daily    venlafaxine  37.5 mg Per G Tube Daily    warfarin  2.5 mg Per G Tube Daily     PRN Meds:0.9%  NaCl infusion (for blood administration), albuterol sulfate, bisacodyL, dextrose 10 % in water (D10W), dextrose 10%, dextrose 10%, glucagon (human recombinant), insulin aspart U-100, melatonin, Flushing PICC/Midline Protocol **AND** [DISCONTINUED] sodium chloride 0.9% **AND** sodium chloride 0.9%, traMADoL    Review of patient's allergies indicates:  No Known Allergies  Objective:     Vital Signs (Most Recent):  Temp: 98 °F (36.7 °C) (12/25/23 0724)  Pulse: 91 (12/25/23 0724)  Resp: 18 (12/25/23 0724)  BP: 97/76 (12/25/23 0724)  SpO2:  97 % (12/25/23 0724) Vital Signs (24h Range):  Temp:  [97.6 °F (36.4 °C)-98.3 °F (36.8 °C)] 98 °F (36.7 °C)  Pulse:  [68-94] 91  Resp:  [18] 18  SpO2:  [91 %-98 %] 97 %  BP: ()/(0-77) 97/76     No data found.  Body mass index is 24.55 kg/m².      Intake/Output Summary (Last 24 hours) at 12/25/2023 0822  Last data filed at 12/24/2023 2101  Gross per 24 hour   Intake 610 ml   Output 0 ml   Net 610 ml       Hemodynamic Parameters:  CVP:  [10 mmHg] 10 mmHg    Telemetry: SR       Physical Exam  Constitutional:       Comments: Cachectic, temporal wasting   HENT:      Head: Normocephalic and atraumatic.   Eyes:      Conjunctiva/sclera: Conjunctivae normal.      Pupils: Pupils are equal, round, and reactive to light.   Neck:      Comments: Do not appreciate elevated JVP  Cardiovascular:      Rate and Rhythm: Normal rate and regular rhythm.      Comments: Smooth VAD hum  Pulmonary:      Effort: Pulmonary effort is normal.      Breath sounds: Normal breath sounds.   Abdominal:      General: Bowel sounds are normal.      Palpations: Abdomen is soft.   Musculoskeletal:         General: No swelling. Normal range of motion.      Cervical back: Normal range of motion and neck supple.   Skin:     General: Skin is warm and dry.      Capillary Refill: Capillary refill takes 2 to 3 seconds.   Neurological:      General: No focal deficit present.      Mental Status: He is alert and oriented to person, place, and time.            Significant Labs:  CBC:  Recent Labs   Lab 12/22/23  0410 12/23/23  0440 12/24/23  0524   WBC 13.20* 11.56 11.88   RBC 2.59* 2.62* 2.58*   HGB 7.5* 7.6* 7.5*   HCT 22.8* 24.3* 24.4*    457* 467*   MCV 88 93 95   MCH 29.0 29.0 29.1   MCHC 32.9 31.3* 30.7*     BNP:  Recent Labs   Lab 12/20/23  0258 12/22/23  0410   BNP 1,130* 1,628*     CMP:  Recent Labs   Lab 12/22/23  0410 12/23/23  0440 12/23/23  0636 12/24/23  0524   GLU 86  --  105 182*   CALCIUM 8.0*  --  8.3* 8.5*   ALBUMIN 1.8*  1.8* 1.9*   --  1.9*   PROT 6.4  6.4 6.6  --  6.6     --  140 139   K 3.7  --  3.8 4.3   CO2 26  --  26 22*     --  100 107   BUN 35*  --  57* 37*   CREATININE 2.6*  --  4.3* 3.1*   ALKPHOS 215*  215* 215*  --  201*   ALT 28  28 18  --  15   AST 50*  50* 48*  --  55*   BILITOT 0.7  0.7 0.5  --  0.5      Coagulation:   Recent Labs   Lab 12/19/23  0419 12/20/23  0258 12/21/23  0352 12/22/23  0410 12/23/23  0440 12/24/23  0524   INR 1.8* 2.0*   < > 2.9* 2.9* 3.3*   APTT 46.2* 47.0*  --   --   --   --     < > = values in this interval not displayed.     LDH:  Recent Labs   Lab 12/23/23  0440 12/24/23  0524   * 437*     Microbiology:  Microbiology Results (last 7 days)       Procedure Component Value Units Date/Time    Fungus culture [[id number]] Collected: 12/06/23 1532    Order Status: Completed Specimen: Body Fluid from Lung, Right Updated: 12/20/23 0949     Fungus (Mycology) Culture Culture in progress      No fungus isolated after 2 weeks    Narrative:      Bronchial Wash            I have reviewed all pertinent labs within the past 24 hours.    Estimated Creatinine Clearance: 27.5 mL/min (A) (based on SCr of 3.1 mg/dL (H)).    Diagnostic Results:  I have reviewed and interpreted all pertinent imaging results/findings within the past 24 hours.  Assessment and Plan:     63 year old male with hx of CAD s/p 3v CABG (unclear anatomy, 2009), ICM with a recent EF of 15-20% s/p ICD (medtronik 2009), DM2 (a1c 7.7), HTN, HLD, Vfib on amio who presents to the ED with CC of SOB     Pt was recently admitted to [facility] as a transfer.  He was started on a Lasix gtt and did well.  Started on gDMT and discharged home on  5 with plans to follow up in HTS clinic for ongoing transplant evaluation at another facility.  He says that about a few days ago he started to notice LE swelling.  This turned into Nelson and orthopnea.  He says he can't walk to the bathroom without getting SOB.  He also complains of weight gain.  He takes  torsemide 20mg BID at home and was told to trial additional Lasix which he did without any improvement.  He was rx metolazone but has not been filled.  He came to the ED     In the ED he was AF with stable VS on RA.  CBC showing chronic anemia.  CMP notable for acute on chronic CKD with baseline around 2.1 and Cr now 2.6.  BNP elevated.  Lactate neg.  CXR showing pulmonary edema.  I evaluated the pt at the bedside.  Bedside TTE showing CVP >15.  He was subsequently admitted to the CCU for diuresis.     He was continued on his home  and was started on a lasix gtt, which he responded well to overnight (net -1700cc. He feels much better this morning as well. Our transplant coordinators have been working on insurance approval and he is now being transferred to [facility] service for transplant evaluation.     * LVAD (left ventricular assist device) present  -HeartMate 3 Implanted  12/1/2023  as DT  -HTS Primary  -Implanted by Dr. Washington  -Continue Coumadin, dosing by PharmD.  Goal INR 2.0-3.0 . Therapeutic today. Discontinue Heparin bridge   -Antiplatelets Not on ASA  -LDH is stable overall today. Will continue to monitor daily.  -Speed set at  4900   rpm, LSL 4500 rpm   -Interrogation notable for no events  -ECHO 12/11 AV does not open, IVS midline, LVEDD 6.1, with HM3 speed set to 4900 rpm  -Not listed for OHTx, declined for OHTX due to comorbidities         Procedure: Device Interrogation Including analysis of device parameters  Current Settings: Ventricular Assist Device  Review of device function is stable/unstable stable        12/21/2023    12:00 PM 12/21/2023    10:00 AM 12/21/2023     9:00 AM 12/21/2023     7:00 AM 12/21/2023     6:00 AM 12/21/2023     5:00 AM 12/21/2023     4:00 AM   TXP LVAD INTERROGATIONS   Type     HeartMate3 HeartMate3 HeartMate3   Flow 4.4 4 4 4.1 4 4.1 4.1   Speed 4900 4900 4900 4900 4900 4900 4900   PI 3.5 4.6 4.5 3.8 4.5 4.5 3.9   Power ([place]rington) 3.2 3.3 3.2 3.2 3.2 3.2 3.2   LSL 4500 4190  4500 4500 4500 4500 4500   Pulsatility Intermittent pulse Intermittent pulse Intermittent pulse Intermittent pulse Intermittent pulse Intermittent pulse Intermittent pulse         Right foot pain  -Neuropathic pain to plantar portion of right foot  -Continue gabapentin 125 mg BID   -Added venlafaxine     Cachexia  -Daily caloric intake with tube feeds increased to 2160 calories, 98 g protein    Unilateral complete vocal fold paralysis  -Appreciate ENT's help  -S/P augmentation of paralyzed left vocal cord 12/18    Dysphonia  -Speech following closely  -Appreciate ENT's help. S/P augmentation of paralyzed left vocal cord 12/18    Moderate malnutrition  -RD and SLP following  -Tolerating tube feeds. Total daily caloric intake increased to 2160  -Failed MBBS 12/20, continue TF    Leukocytosis  -WBC peaked at 33k, trending down at 15k. Afebrile  -Concern for septic shock. Was also on hydrocortisone with increasing pressor requirements, now discontinued   -ID consulted, started on empiric micafungin, meropenem, and vanc, since completed  -Cultures were all -ve  -Completed course for possible pneumonia, ID signed off     Adjustment disorder with anxiety  -Continue Remeron 15 mg q HS    Depressed mood  -Psychiatry consulted for depressed mood, SI when left alone  -Recommend sitter when alone (possibly with transition to stepdown).  -Continue Mirtazapine  -Psychology also following  -Added venlafaxine 12/20         IVÁN (acute kidney injury)  -Nephrology following      Altered mental status  -multifactorial  -likely metabolic encephalopathy +/- icu delirium   -CTH 12/7 negative for acute process  -Continue RRT for metabolic clearance as needed  -EEG done over the weekend no seizures detected, just generalized slowing  -provide frequent re-orientation  -Promote sleep/wake cycle   -Neurology consulted   - Much more awake, continue to encourage and motivate    Acute on chronic combined systolic and diastolic CHF, NYHA class  4  Pt with known ICM with an EF of 25% on home  presented with decompensated HF.      -lRHC 11/14 RA 14, PA 70/35, PCWP 32, CO 4.1, CI 2.1  -Repeat RHC 11/20 RA 20, PA 60/29 (39), PCWP 29, CO 4.6, CI 2.3  -ECHO 11/21 showed EF 15-20%, mild RV dysfunction, mild MR, LVED 6.9   -Home  5 has been weaned off  -Home GDMT: Entreso 24/26 BID (on hold 2/2 IVÁN), hydralazine 25 q 8hr, all held prior to LVAD implant  -Home diuretic: Lasix 40 mg BID   -IABP inserted 11/21 and underwent DT HM3 implant 12/1, chest closed 12/4  -LVAD speed increased to 4900 rpm on 12/7   -ECHO 12/11 AV does not open, IVS midline, LVEDD 6.1, with HM3 speed set to 4900 rpm  -Increased pressor requirements with Giapreza, Ketty, epi, and vaso 12/11. Able to wean off giapreza overnight 12/11. He has been weaned off all pressors and inotropes  -CVP 10. Next HD on Tuesday  -No significant hypotension. Remains on Midodrine 15 TID      PAF (paroxysmal atrial fibrillation)  Known hx of pAF. In sinus rhythm on admission, but 1 run of AF RVR overnight. He spontaneously converted.  - Continue home amiodarone 400mg qd  - Continue AC      Acute renal failure with acute tubular necrosis superimposed on stage 3b chronic kidney disease  IVÁN on CKD2. Baseline Cr of 1.7-2.0.   - Hold ARNi  -Trend BMPs daily   -Nephrology following   -SLED/SCUF for volume removal began on 12/1. He is anuric  -Did not respond to diuretic challenge with 240 mg IV Lasix, 1000 mg Diuril, and 500 mg IV Diamox done 12/14  -Now on HD  -Tunneled trialysis line placed 12/22 by IR    Type 2 diabetes mellitus without complication, without long-term current use of insulin  -A1c 7.6, not insulin dependent  - Endocrine following, appreciate assistance with blood glucose management    CAD (coronary artery disease)  -S/p 3vCABG in 2009  -Lipitor on hold 2/2 mild transaminitis  -Not on ASA post VAD    Ventricular tachycardia  Hx VT s/p ICD placement (medtronic 2009)  - Continue home amio 400mg  [place] [id number]  Heart Transplant  [facility] - Cardiology Stepdown

## 2023-12-25 NOTE — PROGRESS NOTES
12/25/23 1110 12/25/23 1113   Vital Signs   Temp 96.8 °F (36 °C)  --    Temp Source Axillary  --    Pulse 87  --    Heart Rate Source Monitor  --    Resp 18  --    SpO2 96 %  --    Pulse Oximetry Type Intermittent  --    Device (Oxygen Therapy) room air  --    /83 (!) 100/0   MAP (mmHg) 94  --    BP Location Left arm  --    BP Method Automatic Doppler   Patient Position Lying Lying     KEARA Gupta MD notified of pt's BP. Midodrine Tx readjusted. Will recheck BP.

## 2023-12-25 NOTE — PROGRESS NOTES
RN went to administer PRN med to pt via NGT. NGT had 0 residual, however, NGT not flushing with resistance. (NGT flushed and lynette back without resistance at the beginning of shift) Charge nurse Wilfredo assisted RN, no progress. Pt denies fullness in stomach, nausea/vomiting, abdomen is not distended.  MD Ortega made aware. Xray ordered. MD Ortega order to not use NGT until further notice due to not flushing. RN acknowledge MD statement. Pt and spouse updated on POC and verbalized understanding. Will contionue plan of care.

## 2023-12-26 LAB
ALBUMIN SERPL BCP-MCNC: 1.9 G/DL (ref 3.5–5.2)
ALLENS TEST: ABNORMAL
ALP SERPL-CCNC: 199 U/L (ref 55–135)
ALT SERPL W/O P-5'-P-CCNC: 10 U/L (ref 10–44)
ANION GAP SERPL CALC-SCNC: 13 MMOL/L (ref 8–16)
AST SERPL-CCNC: 44 U/L (ref 10–40)
BASOPHILS # BLD AUTO: 0.06 K/UL (ref 0–0.2)
BASOPHILS NFR BLD: 0.5 % (ref 0–1.9)
BILIRUB DIRECT SERPL-MCNC: 0.2 MG/DL (ref 0.1–0.3)
BILIRUB SERPL-MCNC: 0.5 MG/DL (ref 0.1–1)
BUN SERPL-MCNC: 85 MG/DL (ref 8–23)
CA-I BLDV-SCNC: 0.98 MMOL/L (ref 1.06–1.42)
CA-I BLDV-SCNC: 1.08 MMOL/L (ref 1.06–1.42)
CA-I BLDV-SCNC: 1.37 MMOL/L (ref 1.06–1.42)
CALCIUM SERPL-MCNC: 9.8 MG/DL (ref 8.7–10.5)
CHLORIDE SERPL-SCNC: 101 MMOL/L (ref 95–110)
CO2 SERPL-SCNC: 19 MMOL/L (ref 23–29)
CREAT SERPL-MCNC: 5.4 MG/DL (ref 0.5–1.4)
DELSYS: ABNORMAL
DIFFERENTIAL METHOD BLD: ABNORMAL
EOSINOPHIL # BLD AUTO: 0.1 K/UL (ref 0–0.5)
EOSINOPHIL NFR BLD: 1 % (ref 0–8)
ERYTHROCYTE [DISTWIDTH] IN BLOOD BY AUTOMATED COUNT: 18.6 % (ref 11.5–14.5)
EST. GFR  (NO RACE VARIABLE): 11.3 ML/MIN/1.73 M^2
GLUCOSE SERPL-MCNC: 177 MG/DL (ref 70–110)
HCO3 UR-SCNC: 19.8 MMOL/L (ref 24–28)
HCT VFR BLD AUTO: 24.2 % (ref 40–54)
HCT VFR BLD CALC: 24 %PCV (ref 36–54)
HGB BLD-MCNC: 7.3 G/DL (ref 14–18)
IMM GRANULOCYTES # BLD AUTO: 0.04 K/UL (ref 0–0.04)
IMM GRANULOCYTES NFR BLD AUTO: 0.4 % (ref 0–0.5)
INR PPP: 3.4 (ref 0.8–1.2)
LDH SERPL L TO P-CCNC: 407 U/L (ref 110–260)
LYMPHOCYTES # BLD AUTO: 0.8 K/UL (ref 1–4.8)
LYMPHOCYTES NFR BLD: 7.3 % (ref 18–48)
MAGNESIUM SERPL-MCNC: 2.3 MG/DL (ref 1.6–2.6)
MCH RBC QN AUTO: 28.7 PG (ref 27–31)
MCHC RBC AUTO-ENTMCNC: 30.2 G/DL (ref 32–36)
MCV RBC AUTO: 95 FL (ref 82–98)
MODE: ABNORMAL
MONOCYTES # BLD AUTO: 0.5 K/UL (ref 0.3–1)
MONOCYTES NFR BLD: 4.2 % (ref 4–15)
NEUTROPHILS # BLD AUTO: 9.7 K/UL (ref 1.8–7.7)
NEUTROPHILS NFR BLD: 86.6 % (ref 38–73)
NRBC BLD-RTO: 0 /100 WBC
PCO2 BLDA: 41.5 MMHG (ref 35–45)
PH SMN: 7.29 [PH] (ref 7.35–7.45)
PHOSPHATE SERPL-MCNC: 4.1 MG/DL (ref 2.7–4.5)
PLATELET # BLD AUTO: 525 K/UL (ref 150–450)
PMV BLD AUTO: 10.3 FL (ref 9.2–12.9)
PO2 BLDA: 30 MMHG (ref 40–60)
POC BE: -7 MMOL/L
POC IONIZED CALCIUM: 1.43 MMOL/L (ref 1.06–1.42)
POC SATURATED O2: 49 % (ref 95–100)
POC TCO2: 21 MMOL/L (ref 24–29)
POCT GLUCOSE: 108 MG/DL (ref 70–110)
POCT GLUCOSE: 109 MG/DL (ref 70–110)
POCT GLUCOSE: 175 MG/DL (ref 70–110)
POCT GLUCOSE: 191 MG/DL (ref 70–110)
POCT GLUCOSE: 207 MG/DL (ref 70–110)
POTASSIUM BLD-SCNC: 5.1 MMOL/L (ref 3.5–5.1)
POTASSIUM SERPL-SCNC: 5.1 MMOL/L (ref 3.5–5.1)
PROT SERPL-MCNC: 6.8 G/DL (ref 6–8.4)
PROTHROMBIN TIME: 34.2 SEC (ref 9–12.5)
RBC # BLD AUTO: 2.54 M/UL (ref 4.6–6.2)
SAMPLE: ABNORMAL
SITE: ABNORMAL
SODIUM BLD-SCNC: 134 MMOL/L (ref 136–145)
SODIUM SERPL-SCNC: 133 MMOL/L (ref 136–145)
WBC # BLD AUTO: 11.18 K/UL (ref 3.9–12.7)

## 2023-12-26 PROCEDURE — 83735 ASSAY OF MAGNESIUM: CPT | Performed by: INTERNAL MEDICINE

## 2023-12-26 PROCEDURE — 25000003 PHARM REV CODE 250: Performed by: STUDENT IN AN ORGANIZED HEALTH CARE EDUCATION/TRAINING PROGRAM

## 2023-12-26 PROCEDURE — 93750 INTERROGATION VAD IN PERSON: CPT | Mod: ,,, | Performed by: INTERNAL MEDICINE

## 2023-12-26 PROCEDURE — C9113 INJ PANTOPRAZOLE SODIUM, VIA: HCPCS | Performed by: PHYSICIAN ASSISTANT

## 2023-12-26 PROCEDURE — 25000003 PHARM REV CODE 250

## 2023-12-26 PROCEDURE — 84100 ASSAY OF PHOSPHORUS: CPT | Performed by: INTERNAL MEDICINE

## 2023-12-26 PROCEDURE — 99232 SBSQ HOSP IP/OBS MODERATE 35: CPT | Mod: ,,, | Performed by: NURSE PRACTITIONER

## 2023-12-26 PROCEDURE — 25000003 PHARM REV CODE 250: Performed by: INTERNAL MEDICINE

## 2023-12-26 PROCEDURE — 27000248 HC VAD-ADDITIONAL DAY

## 2023-12-26 PROCEDURE — 80100014 HC HEMODIALYSIS 1:1

## 2023-12-26 PROCEDURE — 97530 THERAPEUTIC ACTIVITIES: CPT

## 2023-12-26 PROCEDURE — 63600175 PHARM REV CODE 636 W HCPCS: Performed by: STUDENT IN AN ORGANIZED HEALTH CARE EDUCATION/TRAINING PROGRAM

## 2023-12-26 PROCEDURE — 99233 SBSQ HOSP IP/OBS HIGH 50: CPT | Mod: ,,, | Performed by: PHYSICIAN ASSISTANT

## 2023-12-26 PROCEDURE — 83615 LACTATE (LD) (LDH) ENZYME: CPT | Performed by: STUDENT IN AN ORGANIZED HEALTH CARE EDUCATION/TRAINING PROGRAM

## 2023-12-26 PROCEDURE — 94664 DEMO&/EVAL PT USE INHALER: CPT

## 2023-12-26 PROCEDURE — 97535 SELF CARE MNGMENT TRAINING: CPT

## 2023-12-26 PROCEDURE — 20600001 HC STEP DOWN PRIVATE ROOM

## 2023-12-26 PROCEDURE — 92526 ORAL FUNCTION THERAPY: CPT

## 2023-12-26 PROCEDURE — 94799 UNLISTED PULMONARY SVC/PX: CPT

## 2023-12-26 PROCEDURE — 82803 BLOOD GASES ANY COMBINATION: CPT

## 2023-12-26 PROCEDURE — 94761 N-INVAS EAR/PLS OXIMETRY MLT: CPT | Mod: XB

## 2023-12-26 PROCEDURE — 25000003 PHARM REV CODE 250: Performed by: NURSE PRACTITIONER

## 2023-12-26 PROCEDURE — 85610 PROTHROMBIN TIME: CPT | Performed by: INTERNAL MEDICINE

## 2023-12-26 PROCEDURE — 94668 MNPJ CHEST WALL SBSQ: CPT

## 2023-12-26 PROCEDURE — 80076 HEPATIC FUNCTION PANEL: CPT | Performed by: INTERNAL MEDICINE

## 2023-12-26 PROCEDURE — 80048 BASIC METABOLIC PNL TOTAL CA: CPT | Performed by: NURSE PRACTITIONER

## 2023-12-26 PROCEDURE — 99900035 HC TECH TIME PER 15 MIN (STAT)

## 2023-12-26 PROCEDURE — 63600175 PHARM REV CODE 636 W HCPCS: Performed by: PHYSICIAN ASSISTANT

## 2023-12-26 PROCEDURE — 85025 COMPLETE CBC W/AUTO DIFF WBC: CPT | Performed by: INTERNAL MEDICINE

## 2023-12-26 PROCEDURE — 82330 ASSAY OF CALCIUM: CPT | Mod: 91 | Performed by: STUDENT IN AN ORGANIZED HEALTH CARE EDUCATION/TRAINING PROGRAM

## 2023-12-26 RX ORDER — ZOLPIDEM TARTRATE 5 MG/1
5 TABLET ORAL NIGHTLY PRN
Status: DISCONTINUED | OUTPATIENT
Start: 2023-12-26 | End: 2024-01-24 | Stop reason: HOSPADM

## 2023-12-26 RX ADMIN — VENLAFAXINE 37.5 MG: 37.5 TABLET ORAL at 09:12

## 2023-12-26 RX ADMIN — INSULIN ASPART 2 UNITS: 100 INJECTION, SOLUTION INTRAVENOUS; SUBCUTANEOUS at 05:12

## 2023-12-26 RX ADMIN — INSULIN ASPART 4 UNITS: 100 INJECTION, SOLUTION INTRAVENOUS; SUBCUTANEOUS at 05:12

## 2023-12-26 RX ADMIN — GABAPENTIN 125 MG: 250 SOLUTION ORAL at 08:12

## 2023-12-26 RX ADMIN — Medication: at 08:12

## 2023-12-26 RX ADMIN — SENNOSIDES AND DOCUSATE SODIUM 2 TABLET: 8.6; 5 TABLET ORAL at 09:12

## 2023-12-26 RX ADMIN — MIDODRINE HYDROCHLORIDE 10 MG: 5 TABLET ORAL at 09:12

## 2023-12-26 RX ADMIN — AMIODARONE HYDROCHLORIDE 400 MG: 200 TABLET ORAL at 09:12

## 2023-12-26 RX ADMIN — ACETAMINOPHEN 1000 MG: 500 TABLET ORAL at 04:12

## 2023-12-26 RX ADMIN — ACETAMINOPHEN 1000 MG: 500 TABLET ORAL at 09:12

## 2023-12-26 RX ADMIN — MIRTAZAPINE 15 MG: 15 TABLET, FILM COATED ORAL at 08:12

## 2023-12-26 RX ADMIN — GABAPENTIN 125 MG: 250 SOLUTION ORAL at 09:12

## 2023-12-26 RX ADMIN — MIDODRINE HYDROCHLORIDE 10 MG: 5 TABLET ORAL at 08:12

## 2023-12-26 RX ADMIN — HYDRALAZINE HYDROCHLORIDE 10 MG: 20 INJECTION, SOLUTION INTRAMUSCULAR; INTRAVENOUS at 05:12

## 2023-12-26 RX ADMIN — ACETAMINOPHEN 1000 MG: 500 TABLET ORAL at 08:12

## 2023-12-26 RX ADMIN — Medication: at 09:12

## 2023-12-26 RX ADMIN — PANTOPRAZOLE SODIUM 40 MG: 40 INJECTION, POWDER, FOR SOLUTION INTRAVENOUS at 09:12

## 2023-12-26 RX ADMIN — ZOLPIDEM TARTRATE 5 MG: 5 TABLET ORAL at 09:12

## 2023-12-26 RX ADMIN — INSULIN ASPART 4 UNITS: 100 INJECTION, SOLUTION INTRAVENOUS; SUBCUTANEOUS at 01:12

## 2023-12-26 RX ADMIN — INSULIN ASPART 4 UNITS: 100 INJECTION, SOLUTION INTRAVENOUS; SUBCUTANEOUS at 09:12

## 2023-12-26 RX ADMIN — WARFARIN SODIUM 2.5 MG: 2.5 TABLET ORAL at 04:12

## 2023-12-26 RX ADMIN — MIDODRINE HYDROCHLORIDE 10 MG: 5 TABLET ORAL at 04:12

## 2023-12-26 RX ADMIN — INSULIN ASPART 2 UNITS: 100 INJECTION, SOLUTION INTRAVENOUS; SUBCUTANEOUS at 09:12

## 2023-12-26 NOTE — PROGRESS NOTES
[facility]  Endocrinology  Progress Note    Admit Date: 11/9/2023     Reason for Consult: Management of T2DM, Hyperglycemia     Surgical Procedure and Date: n/a    Diabetes diagnosis year: 1998    Home Diabetes Medications:  Metformin (off since October)   Lab Results   Component Value Date    HGBA1C 7.6 (H) 10/12/2023       How often checking glucose at home?  Once daily in the AM    BG readings on regimen: 150-160s  Hypoglycemia on the regimen?  No  Missed doses on regimen?  n/a    Diabetes Complications include:     Hyperglycemia    Complicating diabetes co morbidities:   CAD s/p CABG, HTN, HLD      HPI:   Patient is a 63 y.o. male with a diagnosis of CAD s/p 3v CABG (unclear anatomy, 2009), ICM with a recent EF of 15-20% s/p ICD (medtronik 2009), DM2 (a1c 7.7), HTN, HLD, Vfib on amio who presents to the ED with CC of SOB. In the ED he was AF with stable VS on RA.  CBC showing chronic anemia.  CMP notable for acute on chronic CKD with baseline around 2.1 and Cr now 2.6.  BNP elevated.  Lactate neg.  CXR showing pulmonary edema. He was subsequently admitted to the CCU for diuresis.  Endocrinology consulted for management of T2DM.          Interval HPI:   Overnight events: No acute events overnight. Patient in room 312/312 A. Blood glucose stable. BG at and above goal on current insulin regimen (SSI, prandial, and basal insulin ). Steroid use- None. 22 Days Post-Op  Renal function- Abnormal - Creatinine 5.4   Vasopressors-  None       Endocrine will continue to follow and manage insulin orders inpatient.         Diet NPO Except for: Medication (per NG tube)     Eating:   NPO  Nausea: No  Hypoglycemia and intervention: No  Fever: No  TPN and/or TF: Yes, TF @ 45 ml/hr      BP (!) 82/0 (BP Location: Left arm, Patient Position: Lying)   Pulse 89   Temp 97.3 °F (36.3 °C) (Axillary)   Resp 18   Ht 6' (1.829 m)   Wt 82.1 kg (181 lb)   SpO2 97%   BMI 24.55 kg/m²     Labs Reviewed and Include   "  Recent Labs   Lab 12/26/23  0449   *   CALCIUM 9.8   ALBUMIN 1.9*   PROT 6.8   *   K 5.1   CO2 19*      BUN 85*   CREATININE 5.4*   ALKPHOS 199*   ALT 10   AST 44*   BILITOT 0.5     Lab Results   Component Value Date    WBC 11.18 12/26/2023    HGB 7.3 (L) 12/26/2023    HCT 24 (L) 12/26/2023    MCV 95 12/26/2023     (H) 12/26/2023     No results for input(s): "TSH", "FREET4" in the last 168 hours.  Lab Results   Component Value Date    HGBA1C 7.6 (H) 10/12/2023       Nutritional status:   Body mass index is 24.55 kg/m².  Lab Results   Component Value Date    ALBUMIN 1.9 (L) 12/26/2023    ALBUMIN 1.9 (L) 12/25/2023    ALBUMIN 1.9 (L) 12/24/2023     Lab Results   Component Value Date    PREALBUMIN 21 12/25/2023    PREALBUMIN 15 (L) 12/23/2023    PREALBUMIN 14 (L) 12/22/2023       Estimated Creatinine Clearance: 15.8 mL/min (A) (based on SCr of 5.4 mg/dL (H)).    Accu-Checks  Recent Labs     12/24/23  0905 12/24/23  1259 12/24/23  1716 12/24/23  1917 12/25/23  0728 12/25/23  1216 12/25/23  1629 12/25/23  2048 12/25/23  2315 12/26/23  0447   POCTGLUCOSE 184* 98 196* 115* 132* 185* 143* 132* 187* 207*       Current Medications and/or Treatments Impacting Glycemic Control  Immunotherapy:    Immunosuppressants       None          Steroids:   Hormones (From admission, onward)      Start     Stop Route Frequency Ordered    12/13/23 2200  melatonin tablet 6 mg         -- Oral Nightly PRN 12/13/23 2100          Pressors:    Autonomic Drugs (From admission, onward)      Start     Stop Route Frequency Ordered    12/25/23 1500  midodrine tablet 10 mg         -- PER NG TUBE 3 times daily 12/25/23 1115          Hyperglycemia/Diabetes Medications:   Antihyperglycemics (From admission, onward)      Start     Stop Route Frequency Ordered    12/25/23 0745  insulin aspart U-100 pen 4 Units         -- SubQ 6 times per day 12/25/23 0742    12/11/23 1027  insulin aspart U-100 pen 0-10 Units         -- SubQ Every " 4 hours PRN 12/11/23 0927            ASSESSMENT and PLAN    Cardiac/Vascular  * LVAD (left ventricular assist device) present  Optimize BG control to improve wound healing        PAF (paroxysmal atrial fibrillation)  May increase insulin resistance.         Acute on chronic combined systolic and diastolic CHF, NYHA class 4  Managed per primary team  Optimize BG control  S/p LVAD     Renal/  Acute renal failure with acute tubular necrosis superimposed on stage 3b chronic kidney disease  Lab Results   Component Value Date    CREATININE 5.4 (H) 12/26/2023     Avoid insulin stacking  Titrate insulin slowly       Endocrine  Type 2 diabetes mellitus without complication, without long-term current use of insulin  BG goal 140-180.      - Novolog 4 units with scheduled TF. (Hold if TF is stopped or if BG < 100 mg/dL);   - BG checks q4hr while on TF/NPO   - Novolog (aspart) insulin prn for BG excursions [facility] [place]150[place]25)   - Hypoglycemia protocol in place    ** Please notify Endocrine for any change and/or advance in diet**  ** Please call Endocrine for any BG related issues **    Discharge Planning:   TBD. Please notify endocrinology prior to discharge.                 Erasmo Parrish, DNP, FNP  Endocrinology  [facility] - [facility] Step[facility]

## 2023-12-26 NOTE — PROGRESS NOTES
12/26/23 0511   Vital Signs   /84   MAP (mmHg) 95   BP Location Left arm   BP Method Automatic   Patient Position Lying     Pressure and doppler elevated. Pt asymptomatic. MD ANNE made aware/ prn med given. Pt tolerated well.    F/u completed. See Flowsheet. MD Anne updated. No new orders. Will continue plan of care. Pt repositioned. Safety precautions in place

## 2023-12-26 NOTE — SUBJECTIVE & OBJECTIVE
Interval History:   Seen on HD this morning, tolerating well.  Noted to have had some unmeasured urinary occurrences but unable to be quantified.  SCR/BUN continue to trend up however off HD.  Net positive 1L/24h, electrolytes non-emergent.  BNP 2604, CVP 10.    Review of patient's allergies indicates:  No Known Allergies  Current Facility-Administered Medications   Medication Frequency    0.9%  NaCl infusion (for blood administration) Q24H PRN    0.9%  NaCl infusion Continuous    0.9%  NaCl infusion Continuous    acetaminophen tablet 1,000 mg TID    albuterol sulfate nebulizer solution 2.5 mg Q4H PRN    amiodarone tablet 400 mg Daily    balsam peru-castor oiL Oint BID    bisacodyL suppository 10 mg Daily PRN    dextrose 10 % infusion Continuous PRN    dextrose 10% bolus 125 mL 125 mL PRN    dextrose 10% bolus 250 mL 250 mL PRN    gabapentin 250 mg/5 mL (5 mL) solution 125 mg Q12H    glucagon (human recombinant) injection 1 mg PRN    hydrALAZINE injection 10 mg Q6H PRN    insulin aspart U-100 pen 0-10 Units Q4H PRN    insulin aspart U-100 pen 4 Units 6 times per day    melatonin tablet 6 mg Nightly PRN    midodrine tablet 10 mg TID    mirtazapine tablet 15 mg Nightly    pantoprazole injection 40 mg Daily    polyethylene glycol packet 17 g Daily    psyllium husk (aspartame) 3.4 gram packet 1 packet Daily    senna-docusate 8.6-50 mg per tablet 2 tablet Daily    sodium chloride 0.9% flush 10 mL PRN    traMADoL tablet 50 mg Q8H PRN    venlafaxine tablet 37.5 mg Daily    warfarin (COUMADIN) tablet 2.5 mg Daily       Objective:     Vital Signs (Most Recent):  Temp: 97.6 °F (36.4 °C) (12/26/23 0937)  Pulse: 90 (12/26/23 1145)  Resp: 15 (12/26/23 0937)  BP: 91/62 (12/26/23 1145)  SpO2: 97 % (12/26/23 0821) Vital Signs (24h Range):  Temp:  [97.3 °F (36.3 °C)-98.1 °F (36.7 °C)] 97.6 °F (36.4 °C)  Pulse:  [63-94] 90  Resp:  [15-18] 15  SpO2:  [94 %-98 %] 97 %  BP: ()/(0-84) 91/62     Weight:  (bed scale not working  patient unable to stand notified nurse) (12/26/23 2457)  Body mass index is 24.55 kg/m².  Body surface area is 2.04 meters squared.    I/O last 3 completed shifts:  In: 1164 [P.O.:10; NG/GT:1154]  Out: 0      Physical Exam  Vitals and nursing note reviewed.   Eyes:      Conjunctiva/sclera: Conjunctivae normal.   Cardiovascular:      Comments: LVAD   Pulmonary:      Effort: Pulmonary effort is normal.      Comments: RA  Abdominal:      Palpations: Abdomen is soft.   Musculoskeletal:         General: No swelling.      Right lower leg: No edema.      Left lower leg: No edema.   Skin:     General: Skin is warm.   Neurological:      Mental Status: He is alert and oriented to person, place, and time.   Psychiatric:         Mood and Affect: Mood normal.          Significant Labs:  CBC:   Recent Labs   Lab 12/26/23 0449 12/26/23  0456   WBC 11.18  --    RBC 2.54*  --    HGB 7.3*  --    HCT 24.2* 24*   *  --    MCV 95  --    MCH 28.7  --    MCHC 30.2*  --      CMP:   Recent Labs   Lab 12/26/23 0449   *   CALCIUM 9.8   ALBUMIN 1.9*   PROT 6.8   *   K 5.1   CO2 19*      BUN 85*   CREATININE 5.4*   ALKPHOS 199*   ALT 10   AST 44*   BILITOT 0.5

## 2023-12-26 NOTE — PROGRESS NOTES
[facility] - Cardiology Stepdown  Heart Transplant  Progress Note    Patient Name: Radha Abbott  MRN: [id number]  Admission Date: 11/9/2023  Hospital Length of Stay: 47 days  Attending Physician: Juventino Bermudez Jr.*  Primary Care Provider: Vasu Kong MD  Principal Problem:LVAD (left ventricular assist device) present    Subjective:   Interval History: No complaints or overnight events. Remains anuric, although wife says some undocumented UOP from wetted pad this AM.   AM hemodynamics: CVP 10, SvO2 49%, CO 6.26 CI 3.06 .   Getting HD this AM. Tube feeds continued and remains NPO. SLP following.     Continuous Infusions:   sodium chloride 0.9% 0 mL/hr (12/17/23 2300)    sodium chloride 0.9%      dextrose 10 % in water (D10W)       Scheduled Meds:   acetaminophen  1,000 mg Per NG tube TID    amiodarone  400 mg Per NG tube Daily    balsam peru-castor oiL   Topical (Top) BID    gabapentin  125 mg Per NG tube Q12H    insulin aspart U-100  4 Units Subcutaneous 6 times per day    midodrine  10 mg Per NG tube TID    mirtazapine  15 mg Per NG tube Nightly    pantoprazole  40 mg Intravenous Daily    polyethylene glycol  17 g Per NG tube Daily    psyllium husk (aspartame)  1 packet Per NG tube Daily    senna-docusate 8.6-50 mg  2 tablet Per NG tube Daily    venlafaxine  37.5 mg Per G Tube Daily    warfarin  2.5 mg Per G Tube Daily     PRN Meds:0.9%  NaCl infusion (for blood administration), albuterol sulfate, bisacodyL, dextrose 10 % in water (D10W), dextrose 10%, dextrose 10%, glucagon (human recombinant), hydrALAZINE, insulin aspart U-100, melatonin, Flushing PICC/Midline Protocol **AND** [DISCONTINUED] sodium chloride 0.9% **AND** sodium chloride 0.9%, traMADoL    Review of patient's allergies indicates:  No Known Allergies  Objective:     Vital Signs (Most Recent):  Temp: 97.6 °F (36.4 °C) (12/26/23 0937)  Pulse: 91 (12/26/23 1100)  Resp: 15 (12/26/23 0937)  BP: (!) 95/58 (12/26/23 1100)  SpO2: 97 %  (12/26/23 0821) Vital Signs (24h Range):  Temp:  [97.3 °F (36.3 °C)-98.5 °F (36.9 °C)] 97.6 °F (36.4 °C)  Pulse:  [63-94] 91  Resp:  [15-20] 15  SpO2:  [94 %-98 %] 97 %  BP: ()/(0-84) 95/58     No data found.  Body mass index is 24.55 kg/m².      Intake/Output Summary (Last 24 hours) at 12/26/2023 1110  Last data filed at 12/26/2023 0754  Gross per 24 hour   Intake 2748 ml   Output 0 ml   Net 2748 ml         Hemodynamic Parameters:  CVP:  [10 mmHg] 10 mmHg    Telemetry: SR       Physical Exam  Constitutional:       Comments: Cachectic, temporal wasting   HENT:      Head: Normocephalic and atraumatic.   Eyes:      Conjunctiva/sclera: Conjunctivae normal.      Pupils: Pupils are equal, round, and reactive to light.   Neck:      Comments: Do not appreciate elevated JVP  Cardiovascular:      Rate and Rhythm: Normal rate and regular rhythm.      Comments: Smooth VAD hum  Pulmonary:      Effort: Pulmonary effort is normal.      Breath sounds: Normal breath sounds.   Abdominal:      General: Bowel sounds are normal.      Palpations: Abdomen is soft.   Musculoskeletal:         General: No swelling. Normal range of motion.      Cervical back: Normal range of motion and neck supple.   Skin:     General: Skin is warm and dry.      Capillary Refill: Capillary refill takes 2 to 3 seconds.   Neurological:      General: No focal deficit present.      Mental Status: He is alert and oriented to person, place, and time.            Significant Labs:  CBC:  Recent Labs   Lab 12/24/23  0524 12/25/23  0503 12/26/23  0449 12/26/23  0456   WBC 11.88 11.40 11.18  --    RBC 2.58* 2.53* 2.54*  --    HGB 7.5* 7.3* 7.3*  --    HCT 24.4* 23.8* 24.2* 24*   * 479* 525*  --    MCV 95 94 95  --    MCH 29.1 28.9 28.7  --    MCHC 30.7* 30.7* 30.2*  --        BNP:  Recent Labs   Lab 12/20/23  0258 12/22/23  0410 12/25/23  0503   BNP 1,130* 1,628* 2,604*       CMP:  Recent Labs   Lab 12/24/23  0524 12/25/23  0503 12/26/23  0449   *  109 177*   CALCIUM 8.5* 8.5* 9.8   ALBUMIN 1.9* 1.9* 1.9*   PROT 6.6 6.8 6.8    138 133*   K 4.3 4.7 5.1   CO2 22* 20* 19*    105 101   BUN 37* 63* 85*   CREATININE 3.1* 4.4* 5.4*   ALKPHOS 201* 193* 199*   ALT 15 11 10   AST 55* 53* 44*   BILITOT 0.5 0.5 0.5        Coagulation:   Recent Labs   Lab 12/20/23  0258 12/21/23  0352 12/24/23  0524 12/25/23  0503 12/26/23  0449   INR 2.0*   < > 3.3* 3.3* 3.4*   APTT 47.0*  --   --   --   --     < > = values in this interval not displayed.       LDH:  Recent Labs   Lab 12/24/23  0524 12/25/23  0503 12/26/23  0449   * 449* 407*       Microbiology:  Microbiology Results (last 7 days)       Procedure Component Value Units Date/Time    Fungus culture [[id number]] Collected: 12/06/23 1532    Order Status: Completed Specimen: Body Fluid from Lung, Right Updated: 12/20/23 0949     Fungus (Mycology) Culture Culture in progress      No fungus isolated after 2 weeks    Narrative:      Bronchial Wash            I have reviewed all pertinent labs within the past 24 hours.    Estimated Creatinine Clearance: 15.8 mL/min (A) (based on SCr of 5.4 mg/dL (H)).    Diagnostic Results:  I have reviewed and interpreted all pertinent imaging results/findings within the past 24 hours.  Assessment and Plan:     63 year old male with hx of CAD s/p 3v CABG (unclear anatomy, 2009), ICM with a recent EF of 15-20% s/p ICD (medtronik 2009), DM2 (a1c 7.7), HTN, HLD, Vfib on amio who presents to the ED with CC of SOB     Pt was recently admitted to [facility] [place] a [place].  He was started on a Lasix gtt and did well.  Started on gDMT and discharged home on  5 with plans to follow up in hospitals clinic for ongoing transplant evaluation at another facility.  He says that about a few days ago he started to notice LE swelling.  This turned into Nelson and orthopnea.  He says he can't walk to the bathroom without getting SOB.  He also complains of weight gain.  He takes torsemide 20mg BID at home and  was told to trial additional Lasix which he did without any improvement.  He was rx metolazone but has not been filled.  He came to the ED     In the ED he was AF with stable VS on RA.  CBC showing chronic anemia.  CMP notable for acute on chronic CKD with baseline around 2.1 and Cr now 2.6.  BNP elevated.  Lactate neg.  CXR showing pulmonary edema.  I evaluated the pt at the bedside.  Bedside TTE showing CVP >15.  He was subsequently admitted to the CCU for diuresis.     He was continued on his home  and was started on a lasix gtt, which he responded well to overnight (net -1700cc. He feels much better this morning as well. Our transplant coordinators have been working on insurance approval and he is now being transferred to [facility] service for transplant evaluation.     * LVAD (left ventricular assist device) present  -HeartMate 3 Implanted  12/1/2023  as DT  -HTS Primary  -Implanted by Dr. Washington  -Continue Coumadin, dosing by PharmD.  Goal INR 2.0-3.0 . Therapeutic today. Discontinue Heparin bridge   -Antiplatelets Not on ASA  -LDH is stable overall today. Will continue to monitor daily.  -Speed set at  4900   rpm, LSL 4500 rpm   -Interrogation notable for no events  -ECHO 12/11 AV does not open, IVS midline, LVEDD 6.1, with HM3 speed set to 4900 rpm  -Not listed for OHTx, declined for OHTX due to comorbidities         Procedure: Device Interrogation Including analysis of device parameters  Current Settings: Ventricular Assist Device  Review of device function is stable/unstable stable        12/26/2023     8:00 AM 12/26/2023     5:30 AM 12/26/2023     5:11 AM 12/26/2023     4:25 AM 12/25/2023    11:00 PM 12/25/2023     8:00 PM 12/25/2023     4:24 PM   TXP LVAD INTERROGATIONS   Type HeartMate3 HeartMate3 HeartMate3 HeartMate3 HeartMate3 HeartMate3 HeartMate3   Flow 4 3.7 3.3 3.4 3.6 3.8 3.7   Speed 4900 4900 4900 4900 4900 4900 4900   PI 3.8 5.6 8.5 7.4 6.3 5.3 6.2   Power (Carrington) 3.2 3.3 3.4 3.5 3.3 3.3 3.3    LSL 4500 4500 4500 4500 4500 4500    Pulsatility Intermittent pulse Intermittent pulse Intermittent pulse Intermittent pulse Intermittent pulse Intermittent pulse          Right foot pain  -Neuropathic pain to plantar portion of right foot  -Continue gabapentin 125 mg BID   -Added venlafaxine     Cachexia  -Daily caloric intake with tube feeds increased to 2160 calories, 98 g protein    Unilateral complete vocal fold paralysis  -Appreciate ENT's help  -S/P augmentation of paralyzed left vocal cord 12/18    Dysphonia  -Speech following closely  -Appreciate ENT's help. S/P augmentation of paralyzed left vocal cord 12/18    Moderate malnutrition  -RD and SLP following  -Tolerating tube feeds. Total daily caloric intake increased to 2160  -Failed MBBS 12/20, continue TF    Leukocytosis  -WBC peaked at 33k, trending down at 15k. Afebrile  -Concern for septic shock. Was also on hydrocortisone with increasing pressor requirements, now discontinued   -ID consulted, started on empiric micafungin, meropenem, and vanc, since completed  -Cultures were all -ve  -Completed course for possible pneumonia, ID signed off     Adjustment disorder with anxiety  -Continue Remeron 15 mg q HS    Depressed mood  -Psychiatry consulted for depressed mood, SI when left alone  -Recommend sitter when alone (possibly with transition to stepdown).  -Continue Mirtazapine  -Psychology also following  -Added venlafaxine 12/20         IVÁN (acute kidney injury)  -Nephrology following      Altered mental status  -multifactorial  -likely metabolic encephalopathy +/- icu delirium   -CTH 12/7 negative for acute process  -Continue RRT for metabolic clearance as needed  -EEG done over the weekend no seizures detected, just generalized slowing  -provide frequent re-orientation  -Promote sleep/wake cycle   -Neurology consulted   - Much more awake, continue to encourage and motivate    Acute on chronic combined systolic and diastolic CHF, NYHA class 4  Pt  with known ICM with an EF of 25% on home  presented with decompensated HF.      -lRHC 11/14 RA 14, PA 70/35, PCWP 32, CO 4.1, CI 2.1  -Repeat RHC 11/20 RA 20, PA 60/29 (39), PCWP 29, CO 4.6, CI 2.3  -ECHO 11/21 showed EF 15-20%, mild RV dysfunction, mild MR, LVED 6.9   -Home  5 has been weaned off  -Home GDMT: Entreso 24/26 BID (on hold 2/2 IVÁN), hydralazine 25 q 8hr, all held prior to LVAD implant  -Home diuretic: Lasix 40 mg BID   -IABP inserted 11/21 and underwent DT HM3 implant 12/1, chest closed 12/4  -LVAD speed increased to 4900 rpm on 12/7   -ECHO 12/11 AV does not open, IVS midline, LVEDD 6.1, with HM3 speed set to 4900 rpm  -Increased pressor requirements with Giapreza, Ketty, epi, and vaso 12/11. Able to wean off giapreza overnight 12/11. He has been weaned off all pressors and inotropes  -CVP 10. Next HD this AM  -No significant hypotension. Remains on Midodrine, weaned to 10 mg TID      PAF (paroxysmal atrial fibrillation)  Known hx of pAF. In sinus rhythm on admission, but 1 run of AF RVR overnight. He spontaneously converted.  - Continue home amiodarone 400mg qd  - Continue AC      Acute renal failure with acute tubular necrosis superimposed on stage 3b chronic kidney disease  IVÁN on CKD2. Baseline Cr of 1.7-2.0.   - Hold ARNi  -Trend BMPs daily   -Nephrology following   -SLED/SCUF for volume removal began on 12/1. He is anuric  -Did not respond to diuretic challenge with 240 mg IV Lasix, 1000 mg Diuril, and 500 mg IV Diamox done 12/14  -Now on HD  -Tunneled trialysis line placed 12/22 by IR    Type 2 diabetes mellitus without complication, without long-term current use of insulin  -A1c 7.6, not insulin dependent  - Endocrine following, appreciate assistance with blood glucose management    CAD (coronary artery disease)  -S/p 3vCABG in 2009  -Lipitor on hold 2/2 mild transaminitis  -Not on ASA post VAD    Ventricular tachycardia  Hx VT s/p ICD placement (medtronic 2009)  - Continue home amio 400mg  qd        Jimy An PA-C  Heart Transplant  [facility] - Cardiology Stepdown

## 2023-12-26 NOTE — ASSESSMENT & PLAN NOTE
-HeartMate 3 Implanted  12/1/2023  as DT  -HTS Primary  -Implanted by Dr. Washington  -Continue Coumadin, dosing by PharmD.  Goal INR 2.0-3.0 . Therapeutic today. Discontinue Heparin bridge   -Antiplatelets Not on ASA  -LDH is stable overall today. Will continue to monitor daily.  -Speed set at  4900   rpm, LSL 4500 rpm   -Interrogation notable for no events  -ECHO 12/11 AV does not open, IVS midline, LVEDD 6.1, with HM3 speed set to 4900 rpm  -Not listed for OHTx, declined for OHTX due to comorbidities         Procedure: Device Interrogation Including analysis of device parameters  Current Settings: Ventricular Assist Device  Review of device function is stable/unstable stable        12/26/2023     8:00 AM 12/26/2023     5:30 AM 12/26/2023     5:11 AM 12/26/2023     4:25 AM 12/25/2023    11:00 PM 12/25/2023     8:00 PM 12/25/2023     4:24 PM   TXP LVAD INTERROGATIONS   Type HeartMate3 HeartMate3 HeartMate3 HeartMate3 HeartMate3 HeartMate3 HeartMate3   Flow 4 3.7 3.3 3.4 3.6 3.8 3.7   Speed 4900 4900 4900 4900 4900 4900 4900   PI 3.8 5.6 8.5 7.4 6.3 5.3 6.2   Power (Carrington) 3.2 3.3 3.4 3.5 3.3 3.3 3.3   LSL 4500 4500 4500 4500 4500 4500    Pulsatility Intermittent pulse Intermittent pulse Intermittent pulse Intermittent pulse Intermittent pulse Intermittent pulse

## 2023-12-26 NOTE — PT/OT/SLP PROGRESS
Physical Therapy      Patient Name:  Radha Abbott   MRN:  [id number]    Patient not seen today secondary to  (pt not seen.  Pt working with OT then pt getting dialysis.). Will follow-up at a later date.    12/26/2023  .

## 2023-12-26 NOTE — SUBJECTIVE & OBJECTIVE
"Interval HPI:   Overnight events: No acute events overnight. Patient in room 312/312 A. Blood glucose stable. BG at and above goal on current insulin regimen (SSI, prandial, and basal insulin ). Steroid use- None. 22 Days Post-Op  Renal function- Abnormal - Creatinine 5.4   Vasopressors-  None       Endocrine will continue to follow and manage insulin orders inpatient.         Diet NPO Except for: Medication (per NG tube)     Eating:   NPO  Nausea: No  Hypoglycemia and intervention: No  Fever: No  TPN and/or TF: Yes, TF @ 45 ml/hr      BP (!) 82/0 (BP Location: Left arm, Patient Position: Lying)   Pulse 89   Temp 97.3 °F (36.3 °C) (Axillary)   Resp 18   Ht 6' (1.829 m)   Wt 82.1 kg (181 lb)   SpO2 97%   BMI 24.55 kg/m²     Labs Reviewed and Include    Recent Labs   Lab 12/26/23  0449   *   CALCIUM 9.8   ALBUMIN 1.9*   PROT 6.8   *   K 5.1   CO2 19*      BUN 85*   CREATININE 5.4*   ALKPHOS 199*   ALT 10   AST 44*   BILITOT 0.5     Lab Results   Component Value Date    WBC 11.18 12/26/2023    HGB 7.3 (L) 12/26/2023    HCT 24 (L) 12/26/2023    MCV 95 12/26/2023     (H) 12/26/2023     No results for input(s): "TSH", "FREET4" in the last 168 hours.  Lab Results   Component Value Date    HGBA1C 7.6 (H) 10/12/2023       Nutritional status:   Body mass index is 24.55 kg/m².  Lab Results   Component Value Date    ALBUMIN 1.9 (L) 12/26/2023    ALBUMIN 1.9 (L) 12/25/2023    ALBUMIN 1.9 (L) 12/24/2023     Lab Results   Component Value Date    PREALBUMIN 21 12/25/2023    PREALBUMIN 15 (L) 12/23/2023    PREALBUMIN 14 (L) 12/22/2023       Estimated Creatinine Clearance: 15.8 mL/min (A) (based on SCr of 5.4 mg/dL (H)).    Accu-Checks  Recent Labs     12/24/23  0905 12/24/23  1259 12/24/23  1716 12/24/23  1917 12/25/23  0728 12/25/23  1216 12/25/23  1629 12/25/23  2048 12/25/23  2315 12/26/23  0447   POCTGLUCOSE 184* 98 196* 115* 132* 185* 143* 132* 187* 207*       Current Medications and/or " Treatments Impacting Glycemic Control  Immunotherapy:    Immunosuppressants       None          Steroids:   Hormones (From admission, onward)      Start     Stop Route Frequency Ordered    12/13/23 2200  melatonin tablet 6 mg         -- Oral Nightly PRN 12/13/23 2100          Pressors:    Autonomic Drugs (From admission, onward)      Start     Stop Route Frequency Ordered    12/25/23 1500  midodrine tablet 10 mg         -- PER NG TUBE 3 times daily 12/25/23 1115          Hyperglycemia/Diabetes Medications:   Antihyperglycemics (From admission, onward)      Start     Stop Route Frequency Ordered    12/25/23 0745  insulin aspart U-100 pen 4 Units         -- SubQ 6 times per day 12/25/23 0742    12/11/23 1027  insulin aspart U-100 pen 0-10 Units         -- SubQ Every 4 hours PRN 12/11/23 0918

## 2023-12-26 NOTE — NURSING
Bedside dialysis tx started to the right chest perm cath per orders placed by ALETHA Stearns:    Order Questions    Question Answer   Antibiotics on HD? No   Duration of Treatment 3 hours   Dialyzer F180NR   Dialysate Temperature (C) 36    mL/min    mL/min   K+ Potassium per Protocol   Ca++ Calcium per Protocol   Na+ Sodium per Protocol   Bicarb Bicarbonate per Protocol   Access Other (please specify)   Fluid Removal (L) 2L   Fluid Removal Instructions maintain MAP >65 mmHG   Dialysate Bath Solution Protocol (DO NOT MODIFY ANSWER) [email or web address] [email or web address] Bath Solution Algorithm (formatted with date).pdf

## 2023-12-26 NOTE — PROGRESS NOTES
[facility] - Cardiology Stepdown  Nephrology  Progress Note    Patient Name: Radha Abbott  MRN: [id number]  Admission Date: 11/9/2023  Hospital Length of Stay: 47 days  Attending Provider: Juventino Bermudez Jr.*   Primary Care Physician: Vasu Kong MD  Principal Problem:LVAD (left ventricular assist device) present    Subjective:     HPI: Mr. Abbott is a 63-year-old man with ischemic cardiomyopathy with most recent TTE showing EF 10 -15% and G3DD s/p [employer] ICM placement on chronic dobutamine infusion, CAD s/p x3 vessel CABG back in 2009, type 2 diabetes mellitus (most recent hemoglobin A1c 7.6%), hypertension, HLD, history of ventricular fibrillation for which he is on amiodarone and CKD IIIb (baseline creatinine ~2-2.2) who was initially admitted on 11/9 with heart failure exacerbation and hypervolemia for which he was managed with inotrope with dobutamine in addition to IV diuresis. He ultimately underwent IABP placed on 11/21 for mechanical hemodynamic support and subsequently underwent LVAD placement on 12/1. His renal function appears to be mostly stable with IVÁN and creatinine in mid 2s to 3s in addition he is still making urine with Lasix infusion however on 12/5 Nephrology consulted given concern for uremia with BUN 72.     Interval History:   Seen on HD this morning, tolerating well.  Noted to have had some unmeasured urinary occurrences but unable to be quantified.  SCR/BUN continue to trend up however off HD.  Net positive 1L/24h, electrolytes non-emergent.  BNP 2604, CVP 10.    Review of patient's allergies indicates:  No Known Allergies  Current Facility-Administered Medications   Medication Frequency    0.9%  NaCl infusion (for blood administration) Q24H PRN    0.9%  NaCl infusion Continuous    0.9%  NaCl infusion Continuous    acetaminophen tablet 1,000 mg TID    albuterol sulfate nebulizer solution 2.5 mg Q4H PRN    amiodarone tablet 400 mg Daily    balsam peru-castor oiL Oint BID     bisacodyL suppository 10 mg Daily PRN    dextrose 10 % infusion Continuous PRN    dextrose 10% bolus 125 mL 125 mL PRN    dextrose 10% bolus 250 mL 250 mL PRN    gabapentin 250 mg/5 mL (5 mL) solution 125 mg Q12H    glucagon (human recombinant) injection 1 mg PRN    hydrALAZINE injection 10 mg Q6H PRN    insulin aspart U-100 pen 0-10 Units Q4H PRN    insulin aspart U-100 pen 4 Units 6 times per day    melatonin tablet 6 mg Nightly PRN    midodrine tablet 10 mg TID    mirtazapine tablet 15 mg Nightly    pantoprazole injection 40 mg Daily    polyethylene glycol packet 17 g Daily    psyllium husk (aspartame) 3.4 gram packet 1 packet Daily    senna-docusate 8.6-50 mg per tablet 2 tablet Daily    sodium chloride 0.9% flush 10 mL PRN    traMADoL tablet 50 mg Q8H PRN    venlafaxine tablet 37.5 mg Daily    warfarin (COUMADIN) tablet 2.5 mg Daily       Objective:     Vital Signs (Most Recent):  Temp: 97.6 °F (36.4 °C) (12/26/23 0937)  Pulse: 90 (12/26/23 1145)  Resp: 15 (12/26/23 0937)  BP: 91/62 (12/26/23 1145)  SpO2: 97 % (12/26/23 0821) Vital Signs (24h Range):  Temp:  [97.3 °F (36.3 °C)-98.1 °F (36.7 °C)] 97.6 °F (36.4 °C)  Pulse:  [63-94] 90  Resp:  [15-18] 15  SpO2:  [94 %-98 %] 97 %  BP: ()/(0-84) 91/62     Weight:  (bed scale not working patient unable to stand notified nurse) (12/26/23 0754)  Body mass index is 24.55 kg/m².  Body surface area is 2.04 meters squared.    I/O last 3 completed shifts:  In: 1164 [P.O.:10; NG/GT:1154]  Out: 0      Physical Exam  Vitals and nursing note reviewed.   Eyes:      Conjunctiva/sclera: Conjunctivae normal.   Cardiovascular:      Comments: LVAD   Pulmonary:      Effort: Pulmonary effort is normal.      Comments: RA  Abdominal:      Palpations: Abdomen is soft.   Musculoskeletal:         General: No swelling.      Right lower leg: No edema.      Left lower leg: No edema.   Skin:     General: Skin is warm.   Neurological:      Mental Status: He is alert and oriented to person,  place, and time.   Psychiatric:         Mood and Affect: Mood normal.          Significant Labs:  CBC:   Recent Labs   Lab 12/26/23  0449 12/26/23  0456   WBC 11.18  --    RBC 2.54*  --    HGB 7.3*  --    HCT 24.2* 24*   *  --    MCV 95  --    MCH 28.7  --    MCHC 30.2*  --      CMP:   Recent Labs   Lab 12/26/23 0449   *   CALCIUM 9.8   ALBUMIN 1.9*   PROT 6.8   *   K 5.1   CO2 19*      BUN 85*   CREATININE 5.4*   ALKPHOS 199*   ALT 10   AST 44*   BILITOT 0.5      Assessment/Plan:     Renal/  Acute renal failure with acute tubular necrosis superimposed on stage 3b chronic kidney disease  - suspect acute tubular injury secondary to hypotension/cardiogenic shock likely compounded by intra-arterial contrast and anemia with urinary sediment with granular casts    -HD today for metabolic clearance/volume management  -UF 2L as tolerated  -strict I/O's  -will check bladder scans q shift  -place purewick to quantify urine output  -recommend starting on lasix 80 mg IV BID  - renal diet/tube feeds when not NPO, volume restriction per primary team  - renally all dose medications to eGFR  - avoid nephrotoxic agents wean feasible (i.e. NSAIDs, intra-arterial contrast, supra-therapeutic vancomycin levels, etc.)  -will continue monitoring for signs of renal recovery        Norm Vidal, NP  Nephrology  [facility] - Cardiology Stepdown

## 2023-12-26 NOTE — PROGRESS NOTES
12/26/2023  [place]    Current provider:  Juventino Bermudez Jr.*    Device interrogation:      12/26/2023     8:00 AM 12/26/2023     5:30 AM 12/26/2023     5:11 AM 12/26/2023     4:25 AM 12/25/2023    11:00 PM 12/25/2023     8:00 PM 12/25/2023     4:24 PM   TXP LVAD INTERROGATIONS   Type HeartMate3 HeartMate3 HeartMate3 HeartMate3 HeartMate3 HeartMate3 HeartMate3   Flow 4 3.7 3.3 3.4 3.6 3.8 3.7   Speed 4900 4900 4900 4900 4900 4900 4900   PI 3.8 5.6 8.5 7.4 6.3 5.3 6.2   Power ([place]) 3.2 3.3 3.4 3.5 3.3 3.3 3.3   LSL 4500 4500 4500 4500 4500 4500    Pulsatility Intermittent pulse Intermittent pulse Intermittent pulse Intermittent pulse Intermittent pulse Intermittent pulse           Rounded on [facility] to ensure all mechanical assist device settings (IABP or VAD) were appropriate and all parameters were within limits.  I was able to ensure all back up equipment was present, the staff had no issues, and the Perfusion Department daily rounding was complete.      For implantable VADs: Interrogation of Ventricular assist device was performed with analysis of device parameters and review of device function. I have personally reviewed the interrogation findings and agree with findings as stated.     In emergency, the nursing units have been notified to contact the perfusion department either by:  Calling [id number] from 630am to 4pm Mon thru Fri, utilizing the On-Call Finder functionality of Epic and searching for Perfusion, or by contacting the hospital  from 4pm to 630am and on weekends and asking to speak with the perfusionist on call.    8:20 AM

## 2023-12-26 NOTE — PLAN OF CARE
Problem: Adult Inpatient Plan of Care  Goal: Patient-Specific Goal (Individualized)  Outcome: Ongoing, Progressing  Flowsheets (Taken 12/25/2023 6109)  Anxieties, Fears or Concerns: Wants to to rehab to be in  Individualized Care Needs: Encourage SLP excercies. Encouraged bed excercies. Encouraged LVAD studybook. Maintain BG protocol. MAintained continuous NG tube feeding @ 45 mL/hr. Monitor and manage high dplers. Lvad dressing to be performed by care giver due 12/27

## 2023-12-26 NOTE — ASSESSMENT & PLAN NOTE
BG goal 140-180.      - Novolog 4 units with scheduled TF. (Hold if TF is stopped or if BG < 100 mg/dL);   - BG checks q4hr while on TF/NPO   - Novolog (aspart) insulin prn for BG excursions [facility] (150/25)   - Hypoglycemia protocol in place    ** Please notify Endocrine for any change and/or advance in diet**  ** Please call Endocrine for any BG related issues **    Discharge Planning:   TBD. Please notify endocrinology prior to discharge.           DISPLAY PLAN FREE TEXT DISPLAY PLAN FREE TEXT DISPLAY PLAN FREE TEXT

## 2023-12-26 NOTE — PT/OT/SLP PROGRESS
Occupational Therapy   Treatment    Name: Radha Abbott  MRN: [id number]  Admitting Diagnosis:  LVAD (left ventricular assist device) present  22 Days Post-Op    Recommendations:     Discharge Recommendations: High Intensity Therapy  Discharge Equipment Recommendations:  bedside commode (B platform RW)  Barriers to discharge:  None    Assessment:     Radha Abbott is a 63 y.o. male with a medical diagnosis of LVAD (left ventricular assist device) present.  He was introduced to [employer] power>battery conversion and utilization of consolidation bag. Patient given step by step directions with good overall participation, but unable to obtain level of assistance secondary to initial education. Patient sat EOB for ~30 minutes with improving posturing, but still requiring cueing for maintaining throughout. Performance deficits affecting function are weakness, impaired endurance, impaired self care skills, impaired functional mobility, impaired balance, decreased safety awareness, decreased lower extremity function, decreased upper extremity function, impaired skin. Patient has demonstrated sufficient progression to warrant high intensity therapy evidenced by objectives noted below.    Rehab Prognosis:  Good; patient would benefit from acute skilled OT services to address these deficits and reach maximum level of function.       Plan:     Patient to be seen 6 x/week to address the above listed problems via self-care/home management, therapeutic activities, therapeutic exercises, neuromuscular re-education  Plan of Care Expires: 01/02/24  Plan of Care Reviewed with: patient, spouse    Subjective     Chief Complaint: none verbalized  Patient/Family Comments/goals: get better  Pain/Comfort:  Pain Rating 1: 0/10  Pain Rating Post-Intervention 1: 0/10    Objective:     Communicated with: nursing prior to session.  Patient found HOB elevated with telemetry, NG tube, LVAD upon OT entry to room. Patient's wife present in room during session.      General Precautions: Standard, fall, LVAD, sternal    Orthopedic Precautions:N/A  Braces: N/A  Respiratory Status: Room air     Occupational Performance:     Bed Mobility:  cueing required for adherence to sternal precautions  Patient completed Supine to Sit with moderate assistance  Patient completed Sit to Supine with moderate assistance   Patient sat EOB for ~ 30 minutes with Stand-by Assistance for LVAD education and ADLs    Functional Mobility/Transfers: session limited by dialysis    Activities of Daily Living:  Grooming: contact guard assistance to wash face and complete oral hygiene via rung oral swab with medical mouthwash; assistance provided for safe navigation of NG tube  Upper Body Dressing: moderate assistance to don consolidation bag   Lower Body Dressing: moderate assistance to don socks sitting EOB      AMPA 6 Click ADL: 12    Treatment & Education:  Patient able to recall 2/3 sternal precautions (no pushing or pulling with UE s or picking up items >5lbs) at initiation of OT session. Patient adhered to all precautions with minimal  cueing throughout session.    He could identify 1/3 major LVAD components. Patient provided initial education on LVAD wall power>battery conversion and placing items into consolidation bag before donning. Patient completed self-test prior to session. Attempt for patient to write values into binder but increased tremors and poor dexterity limiting legibility, thus OT writing values in binder.      Patient educated on:   -purpose of OT and OT POC  -facilitation and education on proper body mechanics, energy conservation, and safety  -importance of early mobility and out of bed activities with staff assist  -overall benefits of therapy     All questions answered within OT scope and to patient's satisfaction    Patient left HOB elevated with all lines intact, call button in reach, nursing notified, and wife and dialysis RN present    GOALS:   Multidisciplinary Problems        Occupational Therapy Goals          Problem: Occupational Therapy    Goal Priority Disciplines Outcome Interventions   Occupational Therapy Goal     OT, PT/OT Ongoing, Progressing    Description: Goals to be met by: 1/2/24     Patient will increase functional independence with ADLs by performing:    UB Dressing with SBA  LE Dressing with Supervision.  Grooming while standing at sink with Supervision.  Toileting from bedside commode with SBA for hygiene and clothing management.   Step transfer to bedside commode with SBA  [place] with VAD management during ADLs                             Time Tracking:     OT Date of Treatment: 12/26/23  OT Start Time: 0846  OT Stop Time: 0925  OT Total Time (min): 39 min    Billable Minutes:Self Care/Home Management 10  Therapeutic Activity 29    OT/PRIETO: OT     Number of PRIETO visits since last OT visit: 1    12/26/2023

## 2023-12-26 NOTE — ASSESSMENT & PLAN NOTE
- suspect acute tubular injury secondary to hypotension/cardiogenic shock likely compounded by intra-arterial contrast and anemia with urinary sediment with granular casts    -HD today for metabolic clearance/volume management  -UF 2L as tolerated  -strict I/O's  -will check bladder scans q shift  -place purewick to quantify urine output  -recommend starting on lasix 80 mg IV BID  - renal diet/tube feeds when not NPO, volume restriction per primary team  - renally all dose medications to eGFR  - avoid nephrotoxic agents wean feasible (i.e. NSAIDs, intra-arterial contrast, supra-therapeutic vancomycin levels, etc.)  -will continue monitoring for signs of renal recovery

## 2023-12-26 NOTE — ASSESSMENT & PLAN NOTE
Lab Results   Component Value Date    CREATININE 5.4 (H) 12/26/2023     Avoid insulin stacking  Titrate insulin slowly

## 2023-12-26 NOTE — ANESTHESIA POSTPROCEDURE EVALUATION
Anesthesia Post Evaluation    Patient: Radha Abbott    Procedure(s) Performed: * No procedures listed *    Final Anesthesia Type: general      Patient location during evaluation: PACU  Patient participation: Yes- Able to Participate  Level of consciousness: awake and alert and oriented  Post-procedure vital signs: reviewed and stable  Pain management: adequate  Airway patency: patent    PONV status at discharge: No PONV  Anesthetic complications: no      Cardiovascular status: hemodynamically stable  Respiratory status: unassisted, spontaneous ventilation and room air  Hydration status: euvolemic  Follow-up not needed.              Vitals Value Taken Time   BP 82/0 12/26/23 0806   Temp 36.3 °C (97.3 °F) 12/26/23 0754   Pulse 89 12/26/23 0821   Resp 18 12/26/23 0821   SpO2 97 % 12/26/23 0821         Event Time   Out of Recovery 12/22/2023 12:10:00         Pain/Blayne Score: Pain Rating Prior to Med Admin: 8 (12/25/2023  8:55 PM)  Pain Rating Post Med Admin: 0 (12/25/2023  4:00 PM)

## 2023-12-26 NOTE — PLAN OF CARE
Problem: Hemodynamic Instability (Ventricular Assist Device)  Goal: Optimal Blood Flow  Outcome: Ongoing, Progressing     Problem: Device-Related Complication Risk (Hemodialysis)  Goal: Safe, Effective Therapy Delivery  Outcome: Ongoing, Progressing     Problem: Hemodynamic Instability (Hemodialysis)  Goal: Effective Tissue Perfusion  Outcome: Ongoing, Progressing     Problem: Infection (Hemodialysis)  Goal: Absence of Infection Signs and Symptoms  Outcome: Ongoing, Progressing    Dialysis tx ended to the right chest perm cath , saline locked and capped.    Net fluid removed:1.1 L    Report given to primary nurse

## 2023-12-26 NOTE — ASSESSMENT & PLAN NOTE
Pt with known ICM with an EF of 25% on home  presented with decompensated HF.      -lRHC 11/14 RA 14, PA 70/35, PCWP 32, CO 4.1, CI 2.1  -Repeat RHC 11/20 RA 20, PA 60/29 (39), PCWP 29, CO 4.6, CI 2.3  -ECHO 11/21 showed EF 15-20%, mild RV dysfunction, mild MR, LVED 6.9   -Home  5 has been weaned off  -Home GDMT: Entreso 24/26 BID (on hold 2/2 IVÁN), hydralazine 25 q 8hr, all held prior to LVAD implant  -Home diuretic: Lasix 40 mg BID   -IABP inserted 11/21 and underwent DT HM3 implant 12/1, chest closed 12/4  -LVAD speed increased to 4900 rpm on 12/7   -ECHO 12/11 AV does not open, IVS midline, LVEDD 6.1, with HM3 speed set to 4900 rpm  -Increased pressor requirements with Giapreza, Ketty, epi, and vaso 12/11. Able to wean off giapreza overnight 12/11. He has been weaned off all pressors and inotropes  -CVP 10. Next HD this AM  -No significant hypotension. Remains on Midodrine, weaned to 10 mg TID

## 2023-12-26 NOTE — PT/OT/SLP PROGRESS
Speech Language Pathology Treatment    Patient Name:  Radha Abbott   MRN:  [id number]  Admitting Diagnosis: LVAD (left ventricular assist device) present    Recommendations:                 General Recommendations:  Dysphagia therapy  Diet recommendations:  NPO, Liquid Diet Level: NPO   Aspiration Precautions: Strict aspiration precautions   General Precautions: Standard, fall, LVAD, sternal  Communication strategies:  none    Assessment:     Radha Abbott is a 63 y.o. male with an SLP diagnosis of Dysphagia.      Subjective     Awake/alert  Spouse at bedside    Pain/Comfort:  Pain Rating 1: 0/10  Pain Rating Post-Intervention 1: 0/10    Objective:     Has the patient been evaluated by SLP for swallowing?   Yes  Keep patient NPO? Yes     Pt repositioned upright in bed for session. Ongoing education provided to pt and spouse on dysphagia exercises, silent aspiration and overall dysphagia. Pt's spouse stated they completed dysphagia exercises all weekend. His voice is still mildly hoarse, but overall intensity and quality have improved. He completed exercises x10 reps each with ice chips provided mod cues. Recommend repeat MBSS for the week of 1/2 to give optimal time for dysphagia exercises to improve swallow function. Continue NPO diet at this time.     Goals:   Multidisciplinary Problems       SLP Goals          Problem: SLP    Goal Priority Disciplines Outcome   SLP Goal     SLP    Description: Speech Language Pathology Goals  Goals expected to be met by 12/24    1. Pt will participate in ongoing assessment of swallow function to help determine least restrictive diet                            Plan:     Patient to be seen:  4 x/week    Plan of Care reviewed with:  patient, spouse   SLP Follow-Up:  Yes       Discharge recommendations:  High Intensity Therapy       Time Tracking:     SLP Treatment Date:   12/26/23  Speech Start Time:  0940  Speech Stop Time:  1006     Speech Total Time (min):  26 min    Billable Minutes:  Treatment Swallowing Dysfunction 13 and Self Care/Home Management Training 13    12/26/2023

## 2023-12-26 NOTE — SUBJECTIVE & OBJECTIVE
Interval History: No complaints or overnight events. Remains anuric, although wife says some undocumented UOP from wetted pad this AM.   AM hemodynamics: CVP 10, SvO2 49%, CO 6.26 CI 3.06 .   Getting HD this AM. Tube feeds continued and remains NPO. SLP following.     Continuous Infusions:   sodium chloride 0.9% 0 mL/hr (12/17/23 2300)    sodium chloride 0.9%      dextrose 10 % in water (D10W)       Scheduled Meds:   acetaminophen  1,000 mg Per NG tube TID    amiodarone  400 mg Per NG tube Daily    balsam peru-castor oiL   Topical (Top) BID    gabapentin  125 mg Per NG tube Q12H    insulin aspart U-100  4 Units Subcutaneous 6 times per day    midodrine  10 mg Per NG tube TID    mirtazapine  15 mg Per NG tube Nightly    pantoprazole  40 mg Intravenous Daily    polyethylene glycol  17 g Per NG tube Daily    psyllium husk (aspartame)  1 packet Per NG tube Daily    senna-docusate 8.6-50 mg  2 tablet Per NG tube Daily    venlafaxine  37.5 mg Per G Tube Daily    warfarin  2.5 mg Per G Tube Daily     PRN Meds:0.9%  NaCl infusion (for blood administration), albuterol sulfate, bisacodyL, dextrose 10 % in water (D10W), dextrose 10%, dextrose 10%, glucagon (human recombinant), hydrALAZINE, insulin aspart U-100, melatonin, Flushing PICC/Midline Protocol **AND** [DISCONTINUED] sodium chloride 0.9% **AND** sodium chloride 0.9%, traMADoL    Review of patient's allergies indicates:  No Known Allergies  Objective:     Vital Signs (Most Recent):  Temp: 97.6 °F (36.4 °C) (12/26/23 0937)  Pulse: 91 (12/26/23 1100)  Resp: 15 (12/26/23 0937)  BP: (!) 95/58 (12/26/23 1100)  SpO2: 97 % (12/26/23 0821) Vital Signs (24h Range):  Temp:  [97.3 °F (36.3 °C)-98.5 °F (36.9 °C)] 97.6 °F (36.4 °C)  Pulse:  [63-94] 91  Resp:  [15-20] 15  SpO2:  [94 %-98 %] 97 %  BP: ()/(0-84) 95/58     No data found.  Body mass index is 24.55 kg/m².      Intake/Output Summary (Last 24 hours) at 12/26/2023 1110  Last data filed at 12/26/2023 0754  Gross  per 24 hour   Intake 2748 ml   Output 0 ml   Net 2748 ml         Hemodynamic Parameters:  CVP:  [10 mmHg] 10 mmHg    Telemetry: SR       Physical Exam  Constitutional:       Comments: Cachectic, temporal wasting   HENT:      Head: Normocephalic and atraumatic.   Eyes:      Conjunctiva/sclera: Conjunctivae normal.      Pupils: Pupils are equal, round, and reactive to light.   Neck:      Comments: Do not appreciate elevated JVP  Cardiovascular:      Rate and Rhythm: Normal rate and regular rhythm.      Comments: Smooth VAD hum  Pulmonary:      Effort: Pulmonary effort is normal.      Breath sounds: Normal breath sounds.   Abdominal:      General: Bowel sounds are normal.      Palpations: Abdomen is soft.   Musculoskeletal:         General: No swelling. Normal range of motion.      Cervical back: Normal range of motion and neck supple.   Skin:     General: Skin is warm and dry.      Capillary Refill: Capillary refill takes 2 to 3 seconds.   Neurological:      General: No focal deficit present.      Mental Status: He is alert and oriented to person, place, and time.            Significant Labs:  CBC:  Recent Labs   Lab 12/24/23  0524 12/25/23  0503 12/26/23  0449 12/26/23  0456   WBC 11.88 11.40 11.18  --    RBC 2.58* 2.53* 2.54*  --    HGB 7.5* 7.3* 7.3*  --    HCT 24.4* 23.8* 24.2* 24*   * 479* 525*  --    MCV 95 94 95  --    MCH 29.1 28.9 28.7  --    MCHC 30.7* 30.7* 30.2*  --        BNP:  Recent Labs   Lab 12/20/23  0258 12/22/23  0410 12/25/23  0503   BNP 1,130* 1,628* 2,604*       CMP:  Recent Labs   Lab 12/24/23  0524 12/25/23  0503 12/26/23 0449   * 109 177*   CALCIUM 8.5* 8.5* 9.8   ALBUMIN 1.9* 1.9* 1.9*   PROT 6.6 6.8 6.8    138 133*   K 4.3 4.7 5.1   CO2 22* 20* 19*    105 101   BUN 37* 63* 85*   CREATININE 3.1* 4.4* 5.4*   ALKPHOS 201* 193* 199*   ALT 15 11 10   AST 55* 53* 44*   BILITOT 0.5 0.5 0.5        Coagulation:   Recent Labs   Lab 12/20/23  0258 12/21/23  0355  12/24/23  0524 12/25/23  0503 12/26/23  0449   INR 2.0*   < > 3.3* 3.3* 3.4*   APTT 47.0*  --   --   --   --     < > = values in this interval not displayed.       LDH:  Recent Labs   Lab 12/24/23  0524 12/25/23  0503 12/26/23  0449   * 449* 407*       Microbiology:  Microbiology Results (last 7 days)       Procedure Component Value Units Date/Time    Fungus culture [[id number]] Collected: 12/06/23 1532    Order Status: Completed Specimen: Body Fluid from Lung, Right Updated: 12/20/23 0949     Fungus (Mycology) Culture Culture in progress      No fungus isolated after 2 weeks    Narrative:      Bronchial Wash            I have reviewed all pertinent labs within the past 24 hours.    Estimated Creatinine Clearance: 15.8 mL/min (A) (based on SCr of 5.4 mg/dL (H)).    Diagnostic Results:  I have reviewed and interpreted all pertinent imaging results/findings within the past 24 hours.

## 2023-12-27 LAB
ALBUMIN SERPL BCP-MCNC: 1.8 G/DL (ref 3.5–5.2)
ALP SERPL-CCNC: 174 U/L (ref 55–135)
ALT SERPL W/O P-5'-P-CCNC: 10 U/L (ref 10–44)
ANION GAP SERPL CALC-SCNC: 10 MMOL/L (ref 8–16)
AST SERPL-CCNC: 38 U/L (ref 10–40)
BASOPHILS # BLD AUTO: 0.07 K/UL (ref 0–0.2)
BASOPHILS NFR BLD: 0.7 % (ref 0–1.9)
BILIRUB DIRECT SERPL-MCNC: 0.2 MG/DL (ref 0.1–0.3)
BILIRUB SERPL-MCNC: 0.4 MG/DL (ref 0.1–1)
BNP SERPL-MCNC: 1779 PG/ML (ref 0–99)
BUN SERPL-MCNC: 49 MG/DL (ref 8–23)
CA-I BLDV-SCNC: 1.13 MMOL/L (ref 1.06–1.42)
CA-I BLDV-SCNC: 1.13 MMOL/L (ref 1.06–1.42)
CA-I BLDV-SCNC: 1.14 MMOL/L (ref 1.06–1.42)
CALCIUM SERPL-MCNC: 8.2 MG/DL (ref 8.7–10.5)
CHLORIDE SERPL-SCNC: 102 MMOL/L (ref 95–110)
CO2 SERPL-SCNC: 25 MMOL/L (ref 23–29)
CREAT SERPL-MCNC: 3.6 MG/DL (ref 0.5–1.4)
CRP SERPL-MCNC: 79.2 MG/L (ref 0–8.2)
DIFFERENTIAL METHOD BLD: ABNORMAL
EOSINOPHIL # BLD AUTO: 0.1 K/UL (ref 0–0.5)
EOSINOPHIL NFR BLD: 1.3 % (ref 0–8)
ERYTHROCYTE [DISTWIDTH] IN BLOOD BY AUTOMATED COUNT: 18.1 % (ref 11.5–14.5)
EST. GFR  (NO RACE VARIABLE): 18.4 ML/MIN/1.73 M^2
GLUCOSE SERPL-MCNC: 150 MG/DL (ref 70–110)
HCT VFR BLD AUTO: 22.1 % (ref 40–54)
HGB BLD-MCNC: 6.9 G/DL (ref 14–18)
IMM GRANULOCYTES # BLD AUTO: 0.05 K/UL (ref 0–0.04)
IMM GRANULOCYTES NFR BLD AUTO: 0.5 % (ref 0–0.5)
INR PPP: 3.1 (ref 0.8–1.2)
LDH SERPL L TO P-CCNC: 376 U/L (ref 110–260)
LYMPHOCYTES # BLD AUTO: 0.7 K/UL (ref 1–4.8)
LYMPHOCYTES NFR BLD: 6.8 % (ref 18–48)
MAGNESIUM SERPL-MCNC: 2 MG/DL (ref 1.6–2.6)
MCH RBC QN AUTO: 28.8 PG (ref 27–31)
MCHC RBC AUTO-ENTMCNC: 31.2 G/DL (ref 32–36)
MCV RBC AUTO: 92 FL (ref 82–98)
MONOCYTES # BLD AUTO: 0.6 K/UL (ref 0.3–1)
MONOCYTES NFR BLD: 5.3 % (ref 4–15)
NEUTROPHILS # BLD AUTO: 8.8 K/UL (ref 1.8–7.7)
NEUTROPHILS NFR BLD: 85.4 % (ref 38–73)
NRBC BLD-RTO: 0 /100 WBC
PHOSPHATE SERPL-MCNC: 2.6 MG/DL (ref 2.7–4.5)
PLATELET # BLD AUTO: 471 K/UL (ref 150–450)
PMV BLD AUTO: 10 FL (ref 9.2–12.9)
POCT GLUCOSE: 110 MG/DL (ref 70–110)
POCT GLUCOSE: 118 MG/DL (ref 70–110)
POCT GLUCOSE: 133 MG/DL (ref 70–110)
POCT GLUCOSE: 142 MG/DL (ref 70–110)
POCT GLUCOSE: 152 MG/DL (ref 70–110)
POCT GLUCOSE: 174 MG/DL (ref 70–110)
POTASSIUM SERPL-SCNC: 3.9 MMOL/L (ref 3.5–5.1)
PREALB SERPL-MCNC: 22 MG/DL (ref 20–43)
PROT SERPL-MCNC: 6.4 G/DL (ref 6–8.4)
PROTHROMBIN TIME: 31.2 SEC (ref 9–12.5)
RBC # BLD AUTO: 2.4 M/UL (ref 4.6–6.2)
SODIUM SERPL-SCNC: 137 MMOL/L (ref 136–145)
WBC # BLD AUTO: 10.33 K/UL (ref 3.9–12.7)

## 2023-12-27 PROCEDURE — 97530 THERAPEUTIC ACTIVITIES: CPT

## 2023-12-27 PROCEDURE — 25000003 PHARM REV CODE 250: Performed by: STUDENT IN AN ORGANIZED HEALTH CARE EDUCATION/TRAINING PROGRAM

## 2023-12-27 PROCEDURE — 99900035 HC TECH TIME PER 15 MIN (STAT)

## 2023-12-27 PROCEDURE — 99233 SBSQ HOSP IP/OBS HIGH 50: CPT | Mod: ,,, | Performed by: PHYSICIAN ASSISTANT

## 2023-12-27 PROCEDURE — 86709 HEPATITIS A IGM ANTIBODY: CPT | Performed by: INTERNAL MEDICINE

## 2023-12-27 PROCEDURE — 80048 BASIC METABOLIC PNL TOTAL CA: CPT | Performed by: NURSE PRACTITIONER

## 2023-12-27 PROCEDURE — 27000646 HC AEROBIKA DEVICE

## 2023-12-27 PROCEDURE — 85025 COMPLETE CBC W/AUTO DIFF WBC: CPT | Performed by: INTERNAL MEDICINE

## 2023-12-27 PROCEDURE — 25000242 PHARM REV CODE 250 ALT 637 W/ HCPCS: Performed by: INTERNAL MEDICINE

## 2023-12-27 PROCEDURE — 83880 ASSAY OF NATRIURETIC PEPTIDE: CPT | Performed by: STUDENT IN AN ORGANIZED HEALTH CARE EDUCATION/TRAINING PROGRAM

## 2023-12-27 PROCEDURE — 97535 SELF CARE MNGMENT TRAINING: CPT

## 2023-12-27 PROCEDURE — 86140 C-REACTIVE PROTEIN: CPT | Performed by: STUDENT IN AN ORGANIZED HEALTH CARE EDUCATION/TRAINING PROGRAM

## 2023-12-27 PROCEDURE — 63600175 PHARM REV CODE 636 W HCPCS: Performed by: PHYSICIAN ASSISTANT

## 2023-12-27 PROCEDURE — 93750 INTERROGATION VAD IN PERSON: CPT | Mod: ,,, | Performed by: INTERNAL MEDICINE

## 2023-12-27 PROCEDURE — C9113 INJ PANTOPRAZOLE SODIUM, VIA: HCPCS | Performed by: PHYSICIAN ASSISTANT

## 2023-12-27 PROCEDURE — 82330 ASSAY OF CALCIUM: CPT | Mod: 91 | Performed by: STUDENT IN AN ORGANIZED HEALTH CARE EDUCATION/TRAINING PROGRAM

## 2023-12-27 PROCEDURE — 85610 PROTHROMBIN TIME: CPT | Performed by: INTERNAL MEDICINE

## 2023-12-27 PROCEDURE — 83735 ASSAY OF MAGNESIUM: CPT | Performed by: INTERNAL MEDICINE

## 2023-12-27 PROCEDURE — 94761 N-INVAS EAR/PLS OXIMETRY MLT: CPT

## 2023-12-27 PROCEDURE — 84100 ASSAY OF PHOSPHORUS: CPT | Performed by: INTERNAL MEDICINE

## 2023-12-27 PROCEDURE — 80076 HEPATIC FUNCTION PANEL: CPT | Performed by: INTERNAL MEDICINE

## 2023-12-27 PROCEDURE — 83615 LACTATE (LD) (LDH) ENZYME: CPT | Performed by: STUDENT IN AN ORGANIZED HEALTH CARE EDUCATION/TRAINING PROGRAM

## 2023-12-27 PROCEDURE — 94664 DEMO&/EVAL PT USE INHALER: CPT

## 2023-12-27 PROCEDURE — 20600001 HC STEP DOWN PRIVATE ROOM

## 2023-12-27 PROCEDURE — 99232 SBSQ HOSP IP/OBS MODERATE 35: CPT | Mod: ,,, | Performed by: NURSE PRACTITIONER

## 2023-12-27 PROCEDURE — 25000003 PHARM REV CODE 250: Performed by: NURSE PRACTITIONER

## 2023-12-27 PROCEDURE — 84134 ASSAY OF PREALBUMIN: CPT | Performed by: NURSE PRACTITIONER

## 2023-12-27 PROCEDURE — 97116 GAIT TRAINING THERAPY: CPT

## 2023-12-27 PROCEDURE — 87340 HEPATITIS B SURFACE AG IA: CPT | Performed by: INTERNAL MEDICINE

## 2023-12-27 PROCEDURE — 86706 HEP B SURFACE ANTIBODY: CPT | Performed by: INTERNAL MEDICINE

## 2023-12-27 PROCEDURE — 25000003 PHARM REV CODE 250: Performed by: INTERNAL MEDICINE

## 2023-12-27 PROCEDURE — 25000003 PHARM REV CODE 250: Performed by: PHYSICIAN ASSISTANT

## 2023-12-27 PROCEDURE — 92526 ORAL FUNCTION THERAPY: CPT

## 2023-12-27 PROCEDURE — 25000003 PHARM REV CODE 250

## 2023-12-27 PROCEDURE — 86704 HEP B CORE ANTIBODY TOTAL: CPT | Performed by: INTERNAL MEDICINE

## 2023-12-27 PROCEDURE — 27000248 HC VAD-ADDITIONAL DAY

## 2023-12-27 RX ORDER — ONDANSETRON HYDROCHLORIDE 2 MG/ML
4 INJECTION, SOLUTION INTRAVENOUS EVERY 6 HOURS PRN
Status: DISCONTINUED | OUTPATIENT
Start: 2023-12-27 | End: 2024-01-23

## 2023-12-27 RX ORDER — WARFARIN 2.5 MG/1
2.5 TABLET ORAL
Status: DISCONTINUED | OUTPATIENT
Start: 2023-12-28 | End: 2023-12-29

## 2023-12-27 RX ORDER — FUROSEMIDE 10 MG/ML
80 INJECTION INTRAMUSCULAR; INTRAVENOUS 2 TIMES DAILY
Status: DISCONTINUED | OUTPATIENT
Start: 2023-12-27 | End: 2024-01-01

## 2023-12-27 RX ORDER — WARFARIN SODIUM 5 MG/1
5 TABLET ORAL
Status: DISCONTINUED | OUTPATIENT
Start: 2023-12-27 | End: 2023-12-29

## 2023-12-27 RX ORDER — MIDODRINE HYDROCHLORIDE 5 MG/1
5 TABLET ORAL 3 TIMES DAILY
Status: DISCONTINUED | OUTPATIENT
Start: 2023-12-27 | End: 2023-12-29

## 2023-12-27 RX ADMIN — VENLAFAXINE 37.5 MG: 37.5 TABLET ORAL at 09:12

## 2023-12-27 RX ADMIN — MIDODRINE HYDROCHLORIDE 5 MG: 5 TABLET ORAL at 08:12

## 2023-12-27 RX ADMIN — MIRTAZAPINE 15 MG: 15 TABLET, FILM COATED ORAL at 08:12

## 2023-12-27 RX ADMIN — INSULIN ASPART 4 UNITS: 100 INJECTION, SOLUTION INTRAVENOUS; SUBCUTANEOUS at 01:12

## 2023-12-27 RX ADMIN — INSULIN ASPART 4 UNITS: 100 INJECTION, SOLUTION INTRAVENOUS; SUBCUTANEOUS at 09:12

## 2023-12-27 RX ADMIN — INSULIN ASPART 4 UNITS: 100 INJECTION, SOLUTION INTRAVENOUS; SUBCUTANEOUS at 05:12

## 2023-12-27 RX ADMIN — MIDODRINE HYDROCHLORIDE 10 MG: 5 TABLET ORAL at 09:12

## 2023-12-27 RX ADMIN — Medication: at 08:12

## 2023-12-27 RX ADMIN — GABAPENTIN 125 MG: 250 SOLUTION ORAL at 09:12

## 2023-12-27 RX ADMIN — PSYLLIUM HUSK 1 PACKET: 3.4 POWDER ORAL at 10:12

## 2023-12-27 RX ADMIN — ZOLPIDEM TARTRATE 5 MG: 5 TABLET ORAL at 09:12

## 2023-12-27 RX ADMIN — Medication: at 10:12

## 2023-12-27 RX ADMIN — POLYETHYLENE GLYCOL 3350 17 G: 17 POWDER, FOR SOLUTION ORAL at 10:12

## 2023-12-27 RX ADMIN — FUROSEMIDE 80 MG: 10 INJECTION, SOLUTION INTRAVENOUS at 09:12

## 2023-12-27 RX ADMIN — FUROSEMIDE 80 MG: 10 INJECTION, SOLUTION INTRAVENOUS at 08:12

## 2023-12-27 RX ADMIN — INSULIN ASPART 2 UNITS: 100 INJECTION, SOLUTION INTRAVENOUS; SUBCUTANEOUS at 05:12

## 2023-12-27 RX ADMIN — SENNOSIDES AND DOCUSATE SODIUM 2 TABLET: 8.6; 5 TABLET ORAL at 09:12

## 2023-12-27 RX ADMIN — AMIODARONE HYDROCHLORIDE 400 MG: 200 TABLET ORAL at 09:12

## 2023-12-27 RX ADMIN — INSULIN ASPART 2 UNITS: 100 INJECTION, SOLUTION INTRAVENOUS; SUBCUTANEOUS at 09:12

## 2023-12-27 RX ADMIN — ACETAMINOPHEN 1000 MG: 500 TABLET ORAL at 09:12

## 2023-12-27 RX ADMIN — PANTOPRAZOLE SODIUM 40 MG: 40 INJECTION, POWDER, FOR SOLUTION INTRAVENOUS at 09:12

## 2023-12-27 RX ADMIN — ACETAMINOPHEN 1000 MG: 500 TABLET ORAL at 08:12

## 2023-12-27 RX ADMIN — GABAPENTIN 125 MG: 250 SOLUTION ORAL at 08:12

## 2023-12-27 RX ADMIN — WARFARIN SODIUM 5 MG: 5 TABLET ORAL at 06:12

## 2023-12-27 RX ADMIN — TRAMADOL HYDROCHLORIDE 50 MG: 50 TABLET, COATED ORAL at 12:12

## 2023-12-27 NOTE — SUBJECTIVE & OBJECTIVE
Interval History:   HD completed on yesterday with 2 L removed per notes. Electrolytes non emergent. sCr 3.6 from 5.4 post HD.   I/Os not accurate.   Remain on Lasix IV. 150 mL charted with 3 unmeasured occurrences. CVP 8 this AM.     Review of patient's allergies indicates:  No Known Allergies  Current Facility-Administered Medications   Medication Frequency    0.9%  NaCl infusion (for blood administration) Q24H PRN    0.9%  NaCl infusion Continuous    0.9%  NaCl infusion Continuous    acetaminophen tablet 1,000 mg TID    albuterol sulfate nebulizer solution 2.5 mg Q4H PRN    amiodarone tablet 400 mg Daily    balsam peru-castor oiL Oint BID    bisacodyL suppository 10 mg Daily PRN    dextrose 10 % infusion Continuous PRN    dextrose 10% bolus 125 mL 125 mL PRN    dextrose 10% bolus 250 mL 250 mL PRN    furosemide injection 80 mg BID    gabapentin 250 mg/5 mL (5 mL) solution 125 mg Q12H    glucagon (human recombinant) injection 1 mg PRN    hydrALAZINE injection 10 mg Q6H PRN    insulin aspart U-100 pen 0-10 Units Q4H PRN    insulin aspart U-100 pen 4 Units 6 times per day    melatonin tablet 6 mg Nightly PRN    midodrine tablet 10 mg TID    mirtazapine tablet 15 mg Nightly    pantoprazole injection 40 mg Daily    polyethylene glycol packet 17 g Daily    psyllium husk (aspartame) 3.4 gram packet 1 packet Daily    senna-docusate 8.6-50 mg per tablet 2 tablet Daily    sodium chloride 0.9% flush 10 mL PRN    traMADoL tablet 50 mg Q8H PRN    venlafaxine tablet 37.5 mg Daily    warfarin (COUMADIN) tablet 2.5 mg Daily    zolpidem tablet 5 mg Nightly PRN       Objective:     Vital Signs (Most Recent):  Temp: 97.4 °F (36.3 °C) (12/27/23 0725)  Pulse: 92 (12/27/23 0725)  Resp: 18 (12/27/23 0725)  BP: (!) 86/0 (12/27/23 0800)  SpO2: 96 % (12/27/23 0725) Vital Signs (24h Range):  Temp:  [96.6 °F (35.9 °C)-98.3 °F (36.8 °C)] 97.4 °F (36.3 °C)  Pulse:  [62-95] 92  Resp:  [16-18] 18  SpO2:  [93 %-99 %] 96 %  BP: (66-98)/(0-77)  86/0     Weight:  (bed scale not working patient unable to stand notified nurse) (12/26/23 8518)  Body mass index is 24.55 kg/m².  Body surface area is 2.04 meters squared.    I/O last 3 completed shifts:  In: 3706 [P.O.:10; NG/GT:3696]  Out: 1970 [Urine:150; Drains:20; Other:1800]     Physical Exam  Vitals and nursing note reviewed.   HENT:      Head: Normocephalic and atraumatic.      Mouth/Throat:      Mouth: Mucous membranes are dry.   Eyes:      Conjunctiva/sclera: Conjunctivae normal.   Cardiovascular:      Comments: LVAD   Pulmonary:      Effort: Pulmonary effort is normal.      Comments: RA  Abdominal:      Palpations: Abdomen is soft.   Musculoskeletal:         General: No swelling.      Right lower leg: No edema.      Left lower leg: No edema.   Skin:     General: Skin is warm.   Neurological:      Mental Status: He is alert and oriented to person, place, and time.   Psychiatric:         Mood and Affect: Mood normal.         Behavior: Behavior normal.          Significant Labs:  CBC:   Recent Labs   Lab 12/27/23  0451   WBC 10.33   RBC 2.40*   HGB 6.9*   HCT 22.1*   *   MCV 92   MCH 28.8   MCHC 31.2*     CMP:   Recent Labs   Lab 12/27/23  0450   *   CALCIUM 8.2*   ALBUMIN 1.8*   PROT 6.4      K 3.9   CO2 25      BUN 49*   CREATININE 3.6*   ALKPHOS 174*   ALT 10   AST 38   BILITOT 0.4     All labs within the past 24 hours have been reviewed.

## 2023-12-27 NOTE — SUBJECTIVE & OBJECTIVE
Interval History: No complaints or overnight events. 150cc UOP documented. CVP 8 this AM. H.D done yesterday w/ 1.8L removed. Tube feeds continued and remains NPO.     Continuous Infusions:   sodium chloride 0.9% 0 mL/hr (12/17/23 2300)    sodium chloride 0.9%      dextrose 10 % in water (D10W)       Scheduled Meds:   acetaminophen  1,000 mg Per NG tube TID    amiodarone  400 mg Per NG tube Daily    balsam peru-castor oiL   Topical (Top) BID    furosemide (LASIX) injection  80 mg Intravenous BID    gabapentin  125 mg Per NG tube Q12H    insulin aspart U-100  4 Units Subcutaneous 6 times per day    midodrine  10 mg Per NG tube TID    mirtazapine  15 mg Per NG tube Nightly    pantoprazole  40 mg Intravenous Daily    polyethylene glycol  17 g Per NG tube Daily    psyllium husk (aspartame)  1 packet Per NG tube Daily    senna-docusate 8.6-50 mg  2 tablet Per NG tube Daily    venlafaxine  37.5 mg Per G Tube Daily    warfarin  2.5 mg Per G Tube Daily     PRN Meds:0.9%  NaCl infusion (for blood administration), albuterol sulfate, bisacodyL, dextrose 10 % in water (D10W), dextrose 10%, dextrose 10%, glucagon (human recombinant), hydrALAZINE, insulin aspart U-100, melatonin, Flushing PICC/Midline Protocol **AND** [DISCONTINUED] sodium chloride 0.9% **AND** sodium chloride 0.9%, traMADoL, zolpidem    Review of patient's allergies indicates:  No Known Allergies  Objective:     Vital Signs (Most Recent):  Temp: 98 °F (36.7 °C) (12/27/23 1119)  Pulse: 95 (12/27/23 1114)  Resp: 18 (12/27/23 0725)  BP: 109/86 (12/27/23 1119)  SpO2: 96 % (12/27/23 0725) Vital Signs (24h Range):  Temp:  [96.6 °F (35.9 °C)-98.3 °F (36.8 °C)] 98 °F (36.7 °C)  Pulse:  [62-95] 95  Resp:  [17-18] 18  SpO2:  [95 %-99 %] 96 %  BP: ()/(0-86) 109/86     No data found.  Body mass index is 24.55 kg/m².      Intake/Output Summary (Last 24 hours) at 12/27/2023 1216  Last data filed at 12/27/2023 0530  Gross per 24 hour   Intake 1048 ml   Output 1810 ml    Net -762 ml         Hemodynamic Parameters:  CVP:  [8 mmHg] 8 mmHg    Telemetry: SR       Physical Exam  Constitutional:       Comments: Cachectic, temporal wasting   HENT:      Head: Normocephalic and atraumatic.   Eyes:      Conjunctiva/sclera: Conjunctivae normal.      Pupils: Pupils are equal, round, and reactive to light.   Neck:      Comments: Do not appreciate elevated JVP  Cardiovascular:      Rate and Rhythm: Normal rate and regular rhythm.      Comments: Smooth VAD hum  Pulmonary:      Effort: Pulmonary effort is normal.      Breath sounds: Normal breath sounds.   Abdominal:      General: Bowel sounds are normal.      Palpations: Abdomen is soft.   Musculoskeletal:         General: No swelling. Normal range of motion.      Cervical back: Normal range of motion and neck supple.   Skin:     General: Skin is warm and dry.      Capillary Refill: Capillary refill takes 2 to 3 seconds.   Neurological:      General: No focal deficit present.      Mental Status: He is alert and oriented to person, place, and time.            Significant Labs:  CBC:  Recent Labs   Lab 12/25/23  0503 12/26/23 0449 12/26/23 0456 12/27/23  0451   WBC 11.40 11.18  --  10.33   RBC 2.53* 2.54*  --  2.40*   HGB 7.3* 7.3*  --  6.9*   HCT 23.8* 24.2* 24* 22.1*   * 525*  --  471*   MCV 94 95  --  92   MCH 28.9 28.7  --  28.8   MCHC 30.7* 30.2*  --  31.2*       BNP:  Recent Labs   Lab 12/22/23  0410 12/25/23  0503 12/27/23  0450   BNP 1,628* 2,604* 1,779*       CMP:  Recent Labs   Lab 12/25/23  0503 12/26/23  0449 12/27/23  0450    177* 150*   CALCIUM 8.5* 9.8 8.2*   ALBUMIN 1.9* 1.9* 1.8*   PROT 6.8 6.8 6.4    133* 137   K 4.7 5.1 3.9   CO2 20* 19* 25    101 102   BUN 63* 85* 49*   CREATININE 4.4* 5.4* 3.6*   ALKPHOS 193* 199* 174*   ALT 11 10 10   AST 53* 44* 38   BILITOT 0.5 0.5 0.4        Coagulation:   Recent Labs   Lab 12/25/23  0503 12/26/23  0449 12/27/23  0450   INR 3.3* 3.4* 3.1*       LDH:  Recent Labs    Lab 12/25/23  0503 12/26/23  0449 12/27/23  0450   * 407* 376*       Microbiology:  Microbiology Results (last 7 days)       ** No results found for the last 168 hours. **            I have reviewed all pertinent labs within the past 24 hours.    Estimated Creatinine Clearance: 23.7 mL/min (A) (based on SCr of 3.6 mg/dL (H)).    Diagnostic Results:  I have reviewed and interpreted all pertinent imaging results/findings within the past 24 hours.

## 2023-12-27 NOTE — ASSESSMENT & PLAN NOTE
-HeartMate 3 Implanted  12/1/2023  as DT  -HTS Primary  -Implanted by Dr. Washington  -Continue Coumadin, dosing by PharmD.  Goal INR 2.0-3.0 . Therapeutic today. Discontinue Heparin bridge   -Antiplatelets Not on ASA  -LDH is stable overall today. Will continue to monitor daily.  -Speed set at  4900   rpm, LSL 4500 rpm   -Interrogation notable for no events  -ECHO 12/11 AV does not open, IVS midline, LVEDD 6.1, with HM3 speed set to 4900 rpm  -Not listed for OHTx, declined for OHTX due to comorbidities         Procedure: Device Interrogation Including analysis of device parameters  Current Settings: Ventricular Assist Device  Review of device function is stable/unstable stable        12/27/2023     8:04 AM 12/27/2023     4:00 AM 12/27/2023    12:00 AM 12/26/2023     8:00 PM 12/26/2023     4:37 PM 12/26/2023    12:00 PM 12/26/2023     8:00 AM   TXP LVAD INTERROGATIONS   Type HeartMate3 HeartMate3 HeartMate3 HeartMate3 HeartMate3 HeartMate3 HeartMate3   Flow 3.7 3.9 3.9 4.1 4.2 4.1 4   Speed 4900 4900 4900 4900 4900 4900 4900   PI 5.1 4.3 3.9 3.4 3.3 3.6 3.8   Power ([place]) 3.3 3.3 3.3 3.3 3 3.1 3.2   LSL 1500 4500 4500 4500 4500 4500 4500   Pulsatility Intermittent pulse Intermittent pulse Intermittent pulse Intermittent pulse Intermittent pulse Intermittent pulse Intermittent pulse

## 2023-12-27 NOTE — NURSING
Patient up sitting onside of bed working with PT and OT complaining of dizziness, Patients VS and blood glucose level(142) WNL( see flowsheet). Jimy An MD notified, and presented to bedside. Midodrine held per MD

## 2023-12-27 NOTE — NURSING
"LVAD dressing change completed by wife  using sterile technique with silver kit.DLES is a "1" with no drainage noted on drain sponge, per old dressing showed by wife. minimal drainage noted on the drain sponge. Tolerated without any complication. Sutures remain intact, no redness, or tenderness reported/ noted.   "

## 2023-12-27 NOTE — PT/OT/SLP PROGRESS
Occupational Therapy   Treatment    Name: Rdaha Abbott  MRN: [id number]  Admitting Diagnosis:  LVAD (left ventricular assist device) present  23 Days Post-Op    Recommendations:     Discharge Recommendations: High Intensity Therapy  Discharge Equipment Recommendations:  bedside commode  Barriers to discharge:  None    Assessment:     Radha Abbott is a 63 y.o. male with a medical diagnosis of LVAD (left ventricular assist device) present.  Pt presents with decreased endurance and impaired mobility performance as limited by cardiovascular status and generalized weakness. Pt found upright in bed and agreeable for therapy. Session focused on EOB balance tasks, breathing strategies, and switching from LVAD batteries. Pt limited today 2/2 dizziness during extensive EOB balance tasks (tolerating ~18 minutes) with RN present measuring vitals for safety. Pt needed cues for upright posture and continues to display FM deficits while managing LVAD. Patient has demonstrated sufficient progression to warrant high intensity therapy evidenced by objectives noted below.   Performance deficits affecting function are weakness, impaired functional mobility, impaired endurance, gait instability, decreased safety awareness, decreased coordination, impaired cardiopulmonary response to activity, impaired fine motor, decreased upper extremity function, impaired balance, impaired self care skills, decreased lower extremity function, decreased ROM, impaired skin.     Rehab Prognosis:  Good; patient would benefit from acute skilled OT services to address these deficits and reach maximum level of function.       Plan:     Patient to be seen 6 x/week to address the above listed problems via self-care/home management, therapeutic activities, therapeutic exercises, neuromuscular re-education  Plan of Care Expires: 01/02/24  Plan of Care Reviewed with: patient, spouse    Subjective     Chief Complaint: feeling dizzy  Patient/Family Comments/goals: pt's  wife wanting pt in chair   Pain/Comfort:  Pain Rating 1: 0/10  Pain Rating Post-Intervention 1: 0/10    Objective:     Communicated with: RN prior to session.  Patient found HOB elevated with telemetry, NG tube, pressure relief boots, LVAD upon OT entry to room.    General Precautions: Standard, fall, LVAD, sternal    Orthopedic Precautions:N/A  Braces: N/A  Respiratory Status: Room air     Occupational Performance:     Bed Mobility:    Patient completed Rolling/Turning to Left with  maximal assistance  Patient completed Scooting/Bridging with maximal assistance  Patient completed Supine to Sit with maximal assistance     Functional Mobility/Transfers:  Functional Mobility: Pt sat EOB ~18 minutes with min-CGA. Pt with posterior lean with cues needed to correct. Pt completed LVAD management at EOB (A needed for FM of switching from wall cables, A needed for FM of consolidation bag, A fastening clips of batteries)  Pt able to recall `1/3 sternal prec.    Activities of Daily Living:  Upper Body Dressing: total assistance fastening gown and donning consolidation bag       [facility] 6 Click ADL: 12    Treatment & Education:  Pt educated on role of occupational therapy, POC, and safety during ADLs and functional mobility. Pt and OT discussed importance of safe, continued mobility to optimize daily living skills. Pt verbalized understanding.     White board updated during session. Pt given instruction to call for medical staff/nurse for assistance.       Patient left sitting edge of bed with all lines intact, call button in reach, RN notified, and RN/spouse present (PT entering session to mobilize pt)    GOALS:   Multidisciplinary Problems       Occupational Therapy Goals          Problem: Occupational Therapy    Goal Priority Disciplines Outcome Interventions   Occupational Therapy Goal     OT, PT/OT Ongoing, Progressing    Description: Goals to be met by: 1/2/24     Patient will increase functional independence with ADLs by  performing:    UB Dressing with SBA  LE Dressing with Supervision.  Grooming while standing at sink with Supervision.  Toileting from bedside commode with SBA for hygiene and clothing management.   Step transfer to bedside commode with SBA  [place] with VAD management during ADLs                             Time Tracking:     OT Date of Treatment: 12/27/23  OT Start Time: 1238  OT Stop Time: 1316  OT Total Time (min): 38 min    Billable Minutes:Self Care/Home Management 10 min  Therapeutic Activity 28 min    OT/PRIETO: OT     Number of PRIETO visits since last OT visit: 1    12/27/2023

## 2023-12-27 NOTE — PT/OT/SLP PROGRESS
Speech Language Pathology Treatment    Patient Name:  Radha Abbott   MRN:  [id number]  Admitting Diagnosis: LVAD (left ventricular assist device) present    Recommendations:                 General Recommendations:  Dysphagia therapy  Diet recommendations:  NPO, Liquid Diet Level: NPO   Aspiration Precautions: Strict aspiration precautions   General Precautions: Standard, fall, LVAD, sternal  Communication strategies:  none    Assessment:     Radha Abbott is a 63 y.o. male with an SLP diagnosis of Dysphagia.      Subjective     Awake/alert  Spouse at bedside    Pain/Comfort:  Pain Rating 1: 0/10  Pain Rating Post-Intervention 1: 0/10    Objective:     Has the patient been evaluated by SLP for swallowing?   Yes  Keep patient NPO? Yes     Pt repositioned upright in bedside chair for session. Ongoing education provided to pt and spouse on dysphagia exercises, silent aspiration, plan for repeat MBSS, and overall dysphagia. Pt's spouse stated they completed dysphagia exercises all weekend. His voice is still mildly hoarse, but overall intensity and quality have improved. He completed exercises x10 reps each with ice chips provided mod cues. Recommend repeat MBSS for the week of 1/2 to give optimal time for dysphagia exercises to improve swallow function. Continue NPO diet at this time.     Goals:   Multidisciplinary Problems       SLP Goals          Problem: SLP    Goal Priority Disciplines Outcome   SLP Goal     SLP    Description: Speech Language Pathology Goals  Goals expected to be met by 12/24    1. Pt will participate in ongoing assessment of swallow function to help determine least restrictive diet                            Plan:     Patient to be seen:  4 x/week    Plan of Care reviewed with:  patient, spouse   SLP Follow-Up:  Yes       Discharge recommendations:  High Intensity Therapy       Time Tracking:     SLP Treatment Date:   12/27/23  Speech Start Time:  1343  Speech Stop Time:  1403     Speech Total Time  (min):  20 min    Billable Minutes: Treatment Swallowing Dysfunction 10 and Self Care/Home Management Training 10    12/27/2023

## 2023-12-27 NOTE — ASSESSMENT & PLAN NOTE
- suspect acute tubular injury secondary to hypotension/cardiogenic shock likely compounded by intra-arterial contrast and anemia with urinary sediment with granular casts    -HD completed on yesterday. No distress today. Will continue TuThSat HD while inpatient.   -Continue Lasix IV BID. Will continue to monitor for any signs of renal recovery.   -Will check bladder scans q shift  -Place purewick to quantify urine output  -strict I/O's  -renal diet/tube feeds when not NPO, volume restriction per primary team  -renally all dose medications to eGFR  -avoid nephrotoxic agents wean feasible (i.e. NSAIDs, intra-arterial contrast, supra-therapeutic vancomycin levels, etc.)

## 2023-12-27 NOTE — PLAN OF CARE
Problem: Adult Inpatient Plan of Care  Goal: Plan of Care Review  Outcome: Ongoing, Progressing  Goal: Patient-Specific Goal (Individualized)  Outcome: Ongoing, Progressing  Goal: Absence of Hospital-Acquired Illness or Injury  Outcome: Ongoing, Progressing  Goal: Optimal Comfort and Wellbeing  Outcome: Ongoing, Progressing  Goal: Readiness for Transition of Care  Outcome: Ongoing, Progressing     Problem: Diabetes Comorbidity  Goal: Blood Glucose Level Within Targeted Range  Outcome: Ongoing, Progressing     Problem: Fluid and Electrolyte Imbalance (Acute Kidney Injury/Impairment)  Goal: Fluid and Electrolyte Balance  Outcome: Ongoing, Progressing     Problem: Oral Intake Inadequate (Acute Kidney Injury/Impairment)  Goal: Optimal Nutrition Intake  Outcome: Ongoing, Progressing     Problem: Renal Function Impairment (Acute Kidney Injury/Impairment)  Goal: Effective Renal Function  Outcome: Ongoing, Progressing     Problem: Infection  Goal: Absence of Infection Signs and Symptoms  Outcome: Ongoing, Progressing     Problem: Adjustment to Illness (Heart Failure)  Goal: Optimal Coping  Outcome: Ongoing, Progressing     Problem: Cardiac Output Decreased (Heart Failure)  Goal: Optimal Cardiac Output  Outcome: Ongoing, Progressing     Problem: Dysrhythmia (Heart Failure)  Goal: Stable Heart Rate and Rhythm  Outcome: Ongoing, Progressing     Problem: Fluid Imbalance (Heart Failure)  Goal: Fluid Balance  Outcome: Ongoing, Progressing     Problem: Functional Ability Impaired (Heart Failure)  Goal: Optimal Functional Ability  Outcome: Ongoing, Progressing     Problem: Oral Intake Inadequate (Heart Failure)  Goal: Optimal Nutrition Intake  Outcome: Ongoing, Progressing     Problem: Sleep Disordered Breathing (Heart Failure)  Goal: Effective Breathing Pattern During Sleep  Outcome: Ongoing, Progressing     Problem: Fall Injury Risk  Goal: Absence of Fall and Fall-Related Injury  Outcome: Ongoing, Progressing     Problem:  Infection (Ventricular Assist Device)  Goal: Absence of Infection Signs and Symptoms  Outcome: Ongoing, Progressing     Problem: Nutrition Impaired (Sepsis/Septic Shock)  Goal: Optimal Nutrition Intake  Outcome: Ongoing, Progressing     Problem: Glycemic Control Impaired (Sepsis/Septic Shock)  Goal: Blood Glucose Level Within Desired Range  Outcome: Ongoing, Progressing

## 2023-12-27 NOTE — PROGRESS NOTES
[facility] - Cardiology Stepdown  Adult Nutrition  Progress Note    SUMMARY       Recommendations    1.) Recommend continuing TF of Novasource Renal to goal rate of 45ml/hr to provide 2160 kcal, 98g PRO, and 774ml FF- FWF per MD (100% of EEN/EPN)- this is w/in the NIH guidelines for cachexia.                 - monitor for s/s of intolerance such as residuals >500ml, n/v/d, or abdominal distension.                   - consider adding BenePRO BID if CRRT continues (PRO needs will be higher), provides 12g PRO.                  - RD to consider increasing if pt tolerates.      2. Recommend to re-evaluate bowel regimen to help aid in constipation; add fiber is cases of diarrhea.       3. RD to monitor wt, tolerance, skin, labs.     Goals: Meet % of EEN/EPN by RD f/u date  Nutrition Goal Status: goal met  Communication of RD Recs:  (POC)    Assessment and Plan    Endocrine  Moderate malnutrition  Malnutrition Type:  Context: acute illness or injury  Level: moderate    Related to (etiology):   Inadequate nutrition intake    Signs and Symptoms (as evidenced by):   Nutritional intake <75% x 7days, observed orbital wasting and trace edema present.     Malnutrition Characteristic Summary:  Energy Intake (Malnutrition): less than 75% for greater than 7 days  Fluid Accumulation (Malnutrition):  (Trace edema)      Interventions/Recommendations (treatment strategy):  Recommend continuing TF of Novasource Renal to goal rate of 45ml/hr to provide 2160 kcal, 98g PRO, and 774ml FF- FWF per MD (100% of EEN/EPN)- this is w/in the NIH guidelines for cachexia.                 - monitor for s/s of intolerance such as residuals >500ml, n/v/d, or abdominal distension.                  - consider adding BenePRO BID if CRRT continues (PRO needs will be higher), provides 12g PRO.               - RD to consider increasing if pt tolerates.      2. Recommend to re-evaluate bowel regimen to help aid in constipation; add fiber is cases of  diarrhea.       3. RD to monitor wt, tolerance, skin, labs.     Nutrition Diagnosis Status:   Continues         Malnutrition Assessment  Malnutrition Context: acute illness or injury  Malnutrition Level: moderate          Energy Intake (Malnutrition): less than 75% for greater than 7 days  Fluid Accumulation (Malnutrition):  (Trace edema)   Orbital Region (Subcutaneous Fat Loss): mild depletion   [place] (Muscle Loss): moderate depletion                 Reason for Assessment    Reason For Assessment: RD follow-up  Diagnosis: cardiac disease (CHF/ s/p LVAD on 12/1)  Relevant Medical History: CAD, DMT2, CHF, PAF  Interdisciplinary Rounds: did not attend  General Information Comments: RD f/u: Pt is currently NPO at this time. Pt is currently receiving TF's Novasource Renal- goal rate is 45 ml/hr. RD following. LBM noted-12/27/23  Nutrition Discharge Planning: Cardiac diet education provided 11/15- no additional questions for me at this time.    Nutrition Risk Screen    Nutrition Risk Screen: tube feeding or parenteral nutrition, difficulty chewing/swallowing    Nutrition/Diet History    Spiritual, Cultural Beliefs, Methodist Practices, Values that Affect Care: no  Food Allergies: NKFA    Anthropometrics    Temp: 97 °F (36.1 °C)  Height Method: Stated  Height: 6' (182.9 cm)  Height (inches): 72 in  Weight Method: Bed Scale  Weight:  (patient not able to obtain weight bedscale not working patient unable to stand)  Weight (lb): 181 lb  Ideal Body Weight (IBW), Male: 178 lb  % Ideal Body Weight, Male (lb): 96.63 %  BMI (Calculated): 24.5  BMI Grade: 18.5-24.9 - normal       Lab/Procedures/Meds    Pertinent Labs Reviewed: reviewed  Pertinent Labs Comments: BUN: 31, cr: 2.0, GFR: 37.3, gluc: 179, Ca: 8.1, ALP: 219,  alb: 1.7, CRP: 166.3  Pertinent Medications Reviewed: reviewed  Pertinent Medications Comments: Amiodarone, insulin, pantoprazole, abx, senna-docusate, NaCl, polyethylene glycol, coumadin, psyllium husk,  epi, heparin    Estimated/Assessed Needs    Weight Used For Calorie Calculations: 81 kg (178 lb 9.2 oz) (IBW d/t edema present)  Energy Calorie Requirements (kcal): 2025- 2430 kcal  Energy Need Method: Kcal/kg (25-30 kcal/ kg of IBW)  Protein Requirements: 97- 122g (1.2-1.5g/kg of IBW)  Weight Used For Protein Calculations: 81 kg (178 lb 9.2 oz) (IBW d/t edema present.)  Fluid Requirements (mL): 1 ml/kcal or per MD     RDA Method (mL): 2025         Nutrition Prescription Ordered    Current Diet Order: NPO  Current Nutrition Support Formula Ordered: Novasource Renal  Current Nutrition Support Rate Ordered: 45 (ml)  Current Nutrition Support Frequency Ordered: ml/hr x 24hrs    Evaluation of Received Nutrient/Fluid Intake    Enteral Calories (kcal): 2160  Enteral Protein (gm): 98  Enteral (Free Water) Fluid (mL): 774  % Kcal Needs: 100%  % Protein Needs: 100%  I/O: +3.8L since admit  Energy Calories Required: meeting needs  Protein Required: meeting needs  Fluid Required:  (Per MD)  Total Fluid Intake (mL/kg): 1 ml/kcal or per MD  Tolerance: tolerating  % Intake of Estimated Energy Needs: 75 - 100 %  % Meal Intake: NPO    Nutrition Risk    Level of Risk/Frequency of Follow-up:  (RD to f/u x 1-2/week)     Monitor and Evaluation    Food and Nutrient Intake: energy intake, food and beverage intake, enteral nutrition intake, parenteral nutrition intake  Food and Nutrient Adminstration: diet order, enteral and parenteral nutrition administration  Knowledge/Beliefs/Attitudes: food and nutrition knowledge/skill, beliefs and attitudes  Physical Activity and Function: nutrition-related ADLs and IADLs, factors affecting access to physical activity  Anthropometric Measurements: height/length, weight, weight change, body mass index, growth pattern indices/percentile ranks  Biochemical Data, Medical Tests and Procedures: electrolyte and renal panel, gastrointestinal profile, glucose/endocrine profile, inflammatory profile, lipid  profile  Nutrition-Focused Physical Findings: overall appearance, extremities, muscles and bones, head and eyes, skin     Nutrition Follow-Up    RD Follow-up?: Yes

## 2023-12-27 NOTE — PROGRESS NOTES
12/27/2023  Deni Frye    Current provider:  Brayan Ocampo MD    Device interrogation:      12/27/2023    12:00 PM 12/27/2023     8:04 AM 12/27/2023     4:00 AM 12/27/2023    12:00 AM 12/26/2023     8:00 PM 12/26/2023     4:37 PM 12/26/2023    12:00 PM   TXP LVAD INTERROGATIONS   Type HeartMate3 HeartMate3 HeartMate3 HeartMate3 HeartMate3 HeartMate3 HeartMate3   Flow 4 3.7 3.9 3.9 4.1 4.2 4.1   Speed 4900 4900 4900 4900 4900 4900 4900   PI 3.4 5.1 4.3 3.9 3.4 3.3 3.6   Power ([place]) 3.3 3.3 3.3 3.3 3.3 3 3.1   LSL 4500 4500 4500 4500 4500 4500 4500   Pulsatility Intermittent pulse Intermittent pulse Intermittent pulse Intermittent pulse Intermittent pulse Intermittent pulse Intermittent pulse          Rounded on [facility] to ensure all mechanical assist device settings (IABP or VAD) were appropriate and all parameters were within limits.  I was able to ensure all back up equipment was present, the staff had no issues, and the Perfusion Department daily rounding was complete.      For implantable VADs: Interrogation of Ventricular assist device was performed with analysis of device parameters and review of device function. I have personally reviewed the interrogation findings and agree with findings as stated.     In emergency, the nursing units have been notified to contact the perfusion department either by:  Calling [id number] from 630am to 4pm Mon thru Fri, utilizing the On-Call Finder functionality of Epic and searching for Perfusion, or by contacting the hospital  from 4pm to 630am and on weekends and asking to speak with the perfusionist on call.    3:36 PM

## 2023-12-27 NOTE — PLAN OF CARE
VSS. BG monitored. Spouse at bedside. Tube feeding set and feeding bag changed around 0500. Bladder scan is 45 mL, team aware. Pt educated on fall risk and remained free from falls/trauma/injury. Denies chest pain, SOB, palpitations, dizziness, pain, or discomfort. Plan of care reviewed with pt, all questions answered. Bed locked in lowest position, call bell within reach, no acute distress noted, will continue to monitor.

## 2023-12-27 NOTE — ASSESSMENT & PLAN NOTE
Pt with known ICM with an EF of 25% on home  presented with decompensated HF.      -lRHC 11/14 RA 14, PA 70/35, PCWP 32, CO 4.1, CI 2.1  -Repeat RHC 11/20 RA 20, PA 60/29 (39), PCWP 29, CO 4.6, CI 2.3  -ECHO 11/21 showed EF 15-20%, mild RV dysfunction, mild MR, LVED 6.9   -Home  5 has been weaned off  -Home GDMT: Entreso 24/26 BID (on hold 2/2 IVÁN), hydralazine 25 q 8hr, all held prior to LVAD implant  -Home diuretic: Lasix 40 mg BID   -IABP inserted 11/21 and underwent DT HM3 implant 12/1, chest closed 12/4  -LVAD speed increased to 4900 rpm on 12/7   -ECHO 12/11 AV does not open, IVS midline, LVEDD 6.1, with HM3 speed set to 4900 rpm  -Increased pressor requirements with Giapreza, Ketty, epi, and vaso 12/11. Able to wean off giapreza overnight 12/11. He has been weaned off all pressors and inotropes  -CVP 8. Next HD tomorrow  -No significant hypotension. Remains on Midodrine, weaned to 10 mg TID yesterday, briefly hypotensive with HD yesterday on midodrine 10 so will keep for now.

## 2023-12-27 NOTE — CARE UPDATE
-Glucose Goal 140-180    -A1C:   Hemoglobin A1C   Date Value Ref Range Status   10/12/2023 7.6 (H) 4.0 - 5.6 % Final     Comment:     ADA Screening Guidelines:  5.7-6.4%  Consistent with prediabetes  >or=6.5%  Consistent with diabetes    High levels of fetal hemoglobin interfere with the HbA1C  assay. Heterozygous hemoglobin variants (HbS, HgC, etc)do  not significantly interfere with this assay.   However, presence of multiple variants may affect accuracy.           -HOME REGIMEN: metformin     -GLUCOSE TREND FOR THE PAST 24HRS:   Recent Labs   Lab 12/26/23  1208 12/26/23  1740 12/26/23  2136 12/27/23  0135 12/27/23  0525 12/27/23  0802   POCTGLUCOSE 109 191* 108 133* 152* 110           -NO HYPOGYCEMIAS NOTED     - Diet  Diet NPO Except for: Medication (per NG tube)    -Steroids - none     -Tube Feeds - TF @ 45ml/hr        Plan:   - Novolog 4 units q 4 hr to cover TF- hold if TF held or BG < 100   - POCT Glucose every 4 hours  - Hypoglycemia protocol in place      ** Please notify Endocrine for any change and/or advance in diet**  ** Please call Endocrine for any BG related issues **     Discharge Planning:   TBD. Please notify endocrinology prior to discharge.

## 2023-12-27 NOTE — NURSING
Notified Chente. MD pt c/o melatonin is not working for sleep , and he said he need ambien, and PRN ambien ordered.

## 2023-12-27 NOTE — PROGRESS NOTES
[facility] - Cardiology Step[facility]  Heart Transplant  Progress Note    Patient Name: Radha Abbott  MRN: [id number]  Admission Date: 11/9/2023  Hospital Length of Stay: 48 days  Attending Physician: Brayan Ocampo MD  Primary Care Provider: Vasu Kong MD  Principal Problem:LVAD (left ventricular assist device) present    Subjective:   Interval History: No complaints or overnight events. 150cc UOP documented. CVP 8 this AM. H.D done yesterday w/ 1.8L removed. Tube feeds continued and remains NPO.     Continuous Infusions:   sodium chloride 0.9% 0 mL/hr (12/17/23 2300)    sodium chloride 0.9%      dextrose 10 % in water (D10W)       Scheduled Meds:   acetaminophen  1,000 mg Per NG tube TID    amiodarone  400 mg Per NG tube Daily    balsam peru-castor oiL   Topical (Top) BID    furosemide (LASIX) injection  80 mg Intravenous BID    gabapentin  125 mg Per NG tube Q12H    insulin aspart U-100  4 Units Subcutaneous 6 times per day    midodrine  10 mg Per NG tube TID    mirtazapine  15 mg Per NG tube Nightly    pantoprazole  40 mg Intravenous Daily    polyethylene glycol  17 g Per NG tube Daily    psyllium husk (aspartame)  1 packet Per NG tube Daily    senna-docusate 8.6-50 mg  2 tablet Per NG tube Daily    venlafaxine  37.5 mg Per G Tube Daily    warfarin  2.5 mg Per G Tube Daily     PRN Meds:0.9%  NaCl infusion (for blood administration), albuterol sulfate, bisacodyL, dextrose 10 % in water (D10W), dextrose 10%, dextrose 10%, glucagon (human recombinant), hydrALAZINE, insulin aspart U-100, melatonin, Flushing PICC/Midline Protocol **AND** [DISCONTINUED] sodium chloride 0.9% **AND** sodium chloride 0.9%, traMADoL, zolpidem    Review of patient's allergies indicates:  No Known Allergies  Objective:     Vital Signs (Most Recent):  Temp: 98 °F (36.7 °C) (12/27/23 1119)  Pulse: 95 (12/27/23 1114)  Resp: 18 (12/27/23 0725)  BP: 109/86 (12/27/23 1119)  SpO2: 96 % (12/27/23 0725) Vital Signs (24h Range):  Temp:  [96.6 °F  (35.9 °C)-98.3 °F (36.8 °C)] 98 °F (36.7 °C)  Pulse:  [62-95] 95  Resp:  [17-18] 18  SpO2:  [95 %-99 %] 96 %  BP: ()/(0-86) 109/86     No data found.  Body mass index is 24.55 kg/m².      Intake/Output Summary (Last 24 hours) at 12/27/2023 1216  Last data filed at 12/27/2023 0530  Gross per 24 hour   Intake 1048 ml   Output 1810 ml   Net -762 ml         Hemodynamic Parameters:  CVP:  [8 mmHg] 8 mmHg    Telemetry: SR       Physical Exam  Constitutional:       Comments: Cachectic, temporal wasting   HENT:      Head: Normocephalic and atraumatic.   Eyes:      Conjunctiva/sclera: Conjunctivae normal.      Pupils: Pupils are equal, round, and reactive to light.   Neck:      Comments: Do not appreciate elevated JVP  Cardiovascular:      Rate and Rhythm: Normal rate and regular rhythm.      Comments: Smooth VAD hum  Pulmonary:      Effort: Pulmonary effort is normal.      Breath sounds: Normal breath sounds.   Abdominal:      General: Bowel sounds are normal.      Palpations: Abdomen is soft.   Musculoskeletal:         General: No swelling. Normal range of motion.      Cervical back: Normal range of motion and neck supple.   Skin:     General: Skin is warm and dry.      Capillary Refill: Capillary refill takes 2 to 3 seconds.   Neurological:      General: No focal deficit present.      Mental Status: He is alert and oriented to person, place, and time.            Significant Labs:  CBC:  Recent Labs   Lab 12/25/23  0503 12/26/23  0449 12/26/23  0456 12/27/23  0451   WBC 11.40 11.18  --  10.33   RBC 2.53* 2.54*  --  2.40*   HGB 7.3* 7.3*  --  6.9*   HCT 23.8* 24.2* 24* 22.1*   * 525*  --  471*   MCV 94 95  --  92   MCH 28.9 28.7  --  28.8   MCHC 30.7* 30.2*  --  31.2*       BNP:  Recent Labs   Lab 12/22/23  0410 12/25/23  0503 12/27/23  0450   BNP 1,628* 2,604* 1,779*       CMP:  Recent Labs   Lab 12/25/23  0503 12/26/23  0449 12/27/23  0450    177* 150*   CALCIUM 8.5* 9.8 8.2*   ALBUMIN 1.9* 1.9* 1.8*    PROT 6.8 6.8 6.4    133* 137   K 4.7 5.1 3.9   CO2 20* 19* 25    101 102   BUN 63* 85* 49*   CREATININE 4.4* 5.4* 3.6*   ALKPHOS 193* 199* 174*   ALT 11 10 10   AST 53* 44* 38   BILITOT 0.5 0.5 0.4        Coagulation:   Recent Labs   Lab 12/25/23  0503 12/26/23  0449 12/27/23  0450   INR 3.3* 3.4* 3.1*       LDH:  Recent Labs   Lab 12/25/23  0503 12/26/23  0449 12/27/23  0450   * 407* 376*       Microbiology:  Microbiology Results (last 7 days)       ** No results found for the last 168 hours. **            I have reviewed all pertinent labs within the past 24 hours.    Estimated Creatinine Clearance: 23.7 mL/min (A) (based on SCr of 3.6 mg/dL (H)).    Diagnostic Results:  I have reviewed and interpreted all pertinent imaging results/findings within the past 24 hours.  Assessment and Plan:     63 year old male with hx of CAD s/p 3v CABG (unclear anatomy, 2009), ICM with a recent EF of 15-20% s/p ICD (medtronik 2009), DM2 (a1c 7.7), HTN, HLD, Vfib on amio who presents to the ED with CC of SOB     Pt was recently admitted to [facility] as a transfer.  He was started on a Lasix gtt and did well.  Started on gDMT and discharged home on  5 with plans to follow up in HTS clinic for ongoing transplant evaluation at another facility.  He says that about a few days ago he started to notice LE swelling.  This turned into Nelson and orthopnea.  He says he can't walk to the bathroom without getting SOB.  He also complains of weight gain.  He takes torsemide 20mg BID at home and was told to trial additional Lasix which he did without any improvement.  He was rx metolazone but has not been filled.  He came to the ED     In the ED he was AF with stable VS on RA.  CBC showing chronic anemia.  CMP notable for acute on chronic CKD with baseline around 2.1 and Cr now 2.6.  BNP elevated.  Lactate neg.  CXR showing pulmonary edema.  I evaluated the pt at the bedside.  Bedside TTE showing CVP >15.  He was subsequently  admitted to the CCU for diuresis.     He was continued on his home  and was started on a lasix gtt, which he responded well to overnight (net -1700cc. He feels much better this morning as well. Our transplant coordinators have been working on insurance approval and he is now being transferred to [facility] service for transplant evaluation.     * LVAD (left ventricular assist device) present  -HeartMate 3 Implanted  12/1/2023  as DT  -HTS Primary  -Implanted by Dr. Washington  -Continue Coumadin, dosing by PharmD.  Goal INR 2.0-3.0 . Therapeutic today. Discontinue Heparin bridge   -Antiplatelets Not on ASA  -LDH is stable overall today. Will continue to monitor daily.  -Speed set at  4900   rpm, LSL 4500 rpm   -Interrogation notable for no events  -ECHO 12/11 AV does not open, IVS midline, LVEDD 6.1, with HM3 speed set to 4900 rpm  -Not listed for OHTx, declined for OHTX due to comorbidities         Procedure: Device Interrogation Including analysis of device parameters  Current Settings: Ventricular Assist Device  Review of device function is stable/unstable stable        12/27/2023     8:04 AM 12/27/2023     4:00 AM 12/27/2023    12:00 AM 12/26/2023     8:00 PM 12/26/2023     4:37 PM 12/26/2023    12:00 PM 12/26/2023     8:00 AM   TXP LVAD INTERROGATIONS   Type HeartMate3 HeartMate3 HeartMate3 HeartMate3 HeartMate3 HeartMate3 HeartMate3   Flow 3.7 3.9 3.9 4.1 4.2 4.1 4   Speed 4900 4900 4900 4900 4900 4900 4900   PI 5.1 4.3 3.9 3.4 3.3 3.6 3.8   Power ([place]) 3.3 3.3 3.3 3.3 3 3.1 3.2   LSL 1500 4500 4500 4500 4500 4500 4500   Pulsatility Intermittent pulse Intermittent pulse Intermittent pulse Intermittent pulse Intermittent pulse Intermittent pulse Intermittent pulse         Right foot pain  -Neuropathic pain to plantar portion of right foot  -Continue gabapentin 125 mg BID   -Added venlafaxine     Cachexia  -Daily caloric intake with tube feeds increased to 2160 calories, 98 g protein    Unilateral complete vocal fold  paralysis  -Appreciate ENT's help  -S/P augmentation of paralyzed left vocal cord 12/18    Dysphonia  -Speech following closely  -Appreciate ENT's help. S/P augmentation of paralyzed left vocal cord 12/18    Moderate malnutrition  -RD and SLP following  -Tolerating tube feeds. Total daily caloric intake increased to 2160  -Failed MBBS 12/20, continue TF    Leukocytosis  -WBC peaked at 33k, trending down at 15k. Afebrile  -Concern for septic shock. Was also on hydrocortisone with increasing pressor requirements, now discontinued   -ID consulted, started on empiric micafungin, meropenem, and vanc, since completed  -Cultures were all -ve  -Completed course for possible pneumonia, ID signed off     Adjustment disorder with anxiety  -Continue Remeron 15 mg q HS    Depressed mood  -Psychiatry consulted for depressed mood, SI when left alone  -Recommend sitter when alone (possibly with transition to stepdown).  -Continue Mirtazapine  -Psychology also following  -Added venlafaxine 12/20         IVÁN (acute kidney injury)  -Nephrology following      Altered mental status  -multifactorial  -likely metabolic encephalopathy +/- icu delirium   -CTH 12/7 negative for acute process  -Continue RRT for metabolic clearance as needed  -EEG done over the weekend no seizures detected, just generalized slowing  -provide frequent re-orientation  -Promote sleep/wake cycle   -Neurology consulted   - Much more awake, continue to encourage and motivate    Acute on chronic combined systolic and diastolic CHF, NYHA class 4  Pt with known ICM with an EF of 25% on home  presented with decompensated HF.      -lRHC 11/14 RA 14, PA 70/35, PCWP 32, CO 4.1, CI 2.1  -Repeat RHC 11/20 RA 20, PA 60/29 (39), PCWP 29, CO 4.6, CI 2.3  -ECHO 11/21 showed EF 15-20%, mild RV dysfunction, mild MR, LVED 6.9   -Home  5 has been weaned off  -Home GDMT: Entreso 24/26 BID (on hold 2/2 IVÁN), hydralazine 25 q 8hr, all held prior to LVAD implant  -Home diuretic:  Lasix 40 mg BID   -IABP inserted 11/21 and underwent DT HM3 implant 12/1, chest closed 12/4  -LVAD speed increased to 4900 rpm on 12/7   -ECHO 12/11 AV does not open, IVS midline, LVEDD 6.1, with HM3 speed set to 4900 rpm  -Increased pressor requirements with Giapreza, Ketty, epi, and vaso 12/11. Able to wean off giapreza overnight 12/11. He has been weaned off all pressors and inotropes  -CVP 8. Next HD tomorrow  -No significant hypotension. Remains on Midodrine, weaned to 10 mg TID yesterday, briefly hypotensive with HD yesterday on midodrine 10 so will keep for now.       PAF (paroxysmal atrial fibrillation)  Known hx of pAF. In sinus rhythm on admission, but 1 run of AF RVR overnight. He spontaneously converted.  - Continue home amiodarone 400mg qd  - Continue AC      Acute renal failure with acute tubular necrosis superimposed on stage 3b chronic kidney disease  IVÁN on CKD2. Baseline Cr of 1.7-2.0.   - Hold ARNi  -Trend BMPs daily   -Nephrology following   -SLED/SCUF for volume removal began on 12/1. He is anuric  -Did not respond to diuretic challenge with 240 mg IV Lasix, 1000 mg Diuril, and 500 mg IV Diamox done 12/14  -Now on HD  -Tunneled trialysis line placed 12/22 by IR    Type 2 diabetes mellitus without complication, without long-term current use of insulin  -A1c 7.6, not insulin dependent  - Endocrine following, appreciate assistance with blood glucose management    CAD (coronary artery disease)  -S/p 3vCABG in 2009  -Lipitor on hold 2/2 mild transaminitis  -Not on ASA post VAD    Ventricular tachycardia  Hx VT s/p ICD placement (medtronic 2009)  - Continue home amio 400mg qd        [facility]  Heart Transplant  [facility] - Cardiology Stepdown

## 2023-12-27 NOTE — NURSING
Notified Margarita VALDEZ. CVP is 8, Hgb 6.9, and pt only has 1 unmeasured urine output with small wet urine spot on the pad, male external cath changed and bladder scan result is 45 mL.

## 2023-12-27 NOTE — PT/OT/SLP PROGRESS
Physical Therapy Treatment    Patient Name:  Radha Abbott   MRN:  [id number]    Recommendations:     Discharge Recommendations: High Intensity Therapy  Discharge Equipment Recommendations:  (will determine DME needs closer to discharge)  Barriers to discharge: Decreased caregiver support    Assessment:     Radha Abbott is a 63 y.o. male admitted with a medical diagnosis of LVAD (left ventricular assist device) present.  He presents with the following impairments/functional limitations: weakness, gait instability, impaired endurance, impaired balance, decreased lower extremity function, impaired functional mobility, decreased coordination, decreased safety awareness pt tolerated treatment better being able to gait train farther but is still considerably below previous functional level. Pt will benefit from cont skilled PT 6x/wk to progress physically.  Pt is significantly below previous functional level, increased risk of falls and increased burden of care currently.   Patient has demonstrated sufficient progression to warrant high intensity therapy evidenced by objectives noted below. Pt is s/p LVAD placement 12/1 with chest closure 12/4/23.    Rehab Prognosis: Good; patient would benefit from acute skilled PT services to address these deficits and reach maximum level of function.    Recent Surgery: Procedure(s) (LRB):  CLOSURE, WOUND, STERNUM (N/A)  INSERTION, GRAFT, PERICARDIUM  DRAINAGE, PLEURAL EFFUSION 23 Days Post-Op    Plan:     During this hospitalization, patient to be seen 6 x/week to address the identified rehab impairments via gait training, therapeutic activities and progress toward the following goals:    Plan of Care Expires:  01/04/24    Subjective     Chief Complaint: pt c/o feeling dizzy and weak.   Patient/Family Comments/goals: to get stronger and go home.   Pain/Comfort:  Pain Rating 1: 0/10  Pain Rating Post-Intervention 1: 0/10      Objective:     Communicated with nurse  prior to session.   Patient found sitting edge of bed with NG tube, LVAD, telemetry, PICC line upon PT entry to room.     General Precautions: Standard, LVAD, fall, sternal  Orthopedic Precautions: N/A  Braces: N/A  Respiratory Status: Room air     Functional Mobility:  Transfers:     Sit to Stand:  maximal assistance and of 2 persons with B Platform RW. Pt was max assist x 2 to extend hips to neutral and weight shift center of gravity over base of support for standing balance.  Pt stood  x 2 reps.   Chair to bed transfer: pt was max assist x 2 squat pivot from bedside chair to bed.     Gait: pt received gait training ~ 12 ft x 2 reps with B platform RW and moderate assist at hips for balance. Pt needed verbal cues to increase base of support with gait training. Pt had sitting rest period between distances gait trained. Pt was additional assist of 1 to follow with chair.     Balance: pt sat on EOB with SBA.     Pt white board updated with current therapists name and level of mobility assistance needed.         AM-PAC 6 CLICK MOBILITY  Turning over in bed (including adjusting bedclothes, sheets and blankets)?: 2  Sitting down on and standing up from a chair with arms (e.g., wheelchair, bedside commode, etc.): 2  Moving from lying on back to sitting on the side of the bed?: 2  Moving to and from a bed to a chair (including a wheelchair)?: 2  Need to walk in hospital room?: 2  Climbing 3-5 steps with a railing?: 1  Basic Mobility Total Score: 11       Treatment & Education:  Pt and wife received verbal instructions in PT POC and both verbally expressed understanding of such.     Patient left supine with all lines intact, call button in reach, RN  notified, and wife present..    GOALS:   Multidisciplinary Problems       Physical Therapy Goals          Problem: Physical Therapy    Goal Priority Disciplines Outcome Goal Variances Interventions   Physical Therapy Goal     PT, PT/OT Ongoing, Progressing     Description: Goals to be met by:  23     Patient will increase functional independence with mobility by performin. Sit to stand transfer with modified independence  2. Bed to chair transfer with supervision  3. Gait  x 150 feet with supervision using physician approved AD with standing rest breaks prn.   4. Lower extremity exercise program x30 reps per handout, with independence  5. Recite 3/3 sternal precautions and remain complaint with precautions throughout session with no verbal cues                     Multidisciplinary Problems (Resolved)          Problem: Physical Therapy    Goal Priority Disciplines Outcome Goal Variances Interventions   Physical Therapy Goal   (Resolved)     PT, PT/OT Met     Description: Goals to be met by: 23     Patient will increase functional independence with mobility by performin. Evaluate pt's need for physical therapy.                          Time Tracking:     PT Received On: 23  PT Start Time: 1315     PT Stop Time: 1335  PT Total Time (min): 20 min     Billable Minutes: Gait Training 20 min     Treatment Type: Treatment  PT/PTA: PT     Number of PTA visits since last PT visit: 0     2023

## 2023-12-27 NOTE — NURSING
2030: Notified Chente. MD pt c/o sudden dizziness, doppler 78, HR 90s, he has NG tube feeding , but there is no water bolus order for NG tube, and he had dialysis removed 1 L fluid this AM, cold cloth applied at forehead now, no new order received, [facility].    2035: Notified Chente. MD pt stated dizziness went away after cold cloth applied at forehead, [facility].

## 2023-12-27 NOTE — PROGRESS NOTES
[facility] - Cardiology Stepdown  Nephrology  Progress Note    Patient Name: Radha Abbott  MRN: [id number]  Admission Date: 11/9/2023  Hospital Length of Stay: 48 days  Attending Provider: Brayan Ocampo MD   Primary Care Physician: Vasu Kong MD  Principal Problem:LVAD (left ventricular assist device) present    Subjective:     Interval History:   HD completed on yesterday with 2 L removed per notes. Electrolytes non emergent. sCr 3.6 from 5.4 post HD.   I/Os not accurate.   Remain on Lasix IV. 150 mL charted with 3 unmeasured occurrences. CVP 8 this AM.     Review of patient's allergies indicates:  No Known Allergies  Current Facility-Administered Medications   Medication Frequency    0.9%  NaCl infusion (for blood administration) Q24H PRN    0.9%  NaCl infusion Continuous    0.9%  NaCl infusion Continuous    acetaminophen tablet 1,000 mg TID    albuterol sulfate nebulizer solution 2.5 mg Q4H PRN    amiodarone tablet 400 mg Daily    balsam peru-castor oiL Oint BID    bisacodyL suppository 10 mg Daily PRN    dextrose 10 % infusion Continuous PRN    dextrose 10% bolus 125 mL 125 mL PRN    dextrose 10% bolus 250 mL 250 mL PRN    furosemide injection 80 mg BID    gabapentin 250 mg/5 mL (5 mL) solution 125 mg Q12H    glucagon (human recombinant) injection 1 mg PRN    hydrALAZINE injection 10 mg Q6H PRN    insulin aspart U-100 pen 0-10 Units Q4H PRN    insulin aspart U-100 pen 4 Units 6 times per day    melatonin tablet 6 mg Nightly PRN    midodrine tablet 10 mg TID    mirtazapine tablet 15 mg Nightly    pantoprazole injection 40 mg Daily    polyethylene glycol packet 17 g Daily    psyllium husk (aspartame) 3.4 gram packet 1 packet Daily    senna-docusate 8.6-50 mg per tablet 2 tablet Daily    sodium chloride 0.9% flush 10 mL PRN    traMADoL tablet 50 mg Q8H PRN    venlafaxine tablet 37.5 mg Daily    warfarin (COUMADIN) tablet 2.5 mg Daily    zolpidem tablet 5 mg Nightly PRN       Objective:     Vital Signs (Most  Recent):  Temp: 97.4 °F (36.3 °C) (12/27/23 0725)  Pulse: 92 (12/27/23 0725)  Resp: 18 (12/27/23 0725)  BP: (!) 86/0 (12/27/23 0800)  SpO2: 96 % (12/27/23 0725) Vital Signs (24h Range):  Temp:  [96.6 °F (35.9 °C)-98.3 °F (36.8 °C)] 97.4 °F (36.3 °C)  Pulse:  [62-95] 92  Resp:  [16-18] 18  SpO2:  [93 %-99 %] 96 %  BP: (66-98)/(0-77) 86/0     Weight:  (bed scale not working patient unable to stand notified nurse) (12/26/23 0754)  Body mass index is 24.55 kg/m².  Body surface area is 2.04 meters squared.    I/O last 3 completed shifts:  In: 3706 [P.O.:10; NG/GT:3696]  Out: 1970 [Urine:150; Drains:20; Other:1800]     Physical Exam  Vitals and nursing note reviewed.   HENT:      Head: Normocephalic and atraumatic.      Mouth/Throat:      Mouth: Mucous membranes are dry.   Eyes:      Conjunctiva/sclera: Conjunctivae normal.   Cardiovascular:      Comments: LVAD   Pulmonary:      Effort: Pulmonary effort is normal.      Comments: RA  Abdominal:      Palpations: Abdomen is soft.   Musculoskeletal:         General: No swelling.      Right lower leg: No edema.      Left lower leg: No edema.   Skin:     General: Skin is warm.   Neurological:      Mental Status: He is alert and oriented to person, place, and time.   Psychiatric:         Mood and Affect: Mood normal.         Behavior: Behavior normal.          Significant Labs:  CBC:   Recent Labs   Lab 12/27/23  0451   WBC 10.33   RBC 2.40*   HGB 6.9*   HCT 22.1*   *   MCV 92   MCH 28.8   MCHC 31.2*     CMP:   Recent Labs   Lab 12/27/23  0450   *   CALCIUM 8.2*   ALBUMIN 1.8*   PROT 6.4      K 3.9   CO2 25      BUN 49*   CREATININE 3.6*   ALKPHOS 174*   ALT 10   AST 38   BILITOT 0.4     All labs within the past 24 hours have been reviewed.   Assessment/Plan:     Cardiac/Vascular  * LVAD (left ventricular assist device) present  -management per primary     Acute on chronic combined systolic and diastolic CHF, NYHA class 4  - management per primary  team  - LVAD for mechanical support    Renal/  Acute renal failure with acute tubular necrosis superimposed on stage 3b chronic kidney disease  - suspect acute tubular injury secondary to hypotension/cardiogenic shock likely compounded by intra-arterial contrast and anemia with urinary sediment with granular casts    -HD completed on yesterday. No distress today. Will continue [facility] HD while inpatient.   -Continue Lasix IV BID. Will continue to monitor for any signs of renal recovery.   -Will check bladder scans q shift  -Place purewick to quantify urine output  -strict I/O's  -renal diet/tube feeds when not NPO, volume restriction per primary team  -renally all dose medications to eGFR  -avoid nephrotoxic agents wean feasible (i.e. NSAIDs, intra-arterial contrast, supra-therapeutic vancomycin levels, etc.)          Thank you for your consult. I will follow-up with patient. Please contact us if you have any additional questions.    Matilda Hinton DNP, FNP-C  Nephrology  [facility] - Cardiology Stepdown

## 2023-12-27 NOTE — TREATMENT PLAN
LVAD patient with current needs of iHD    Placing LVADs in OP HD centers is historically difficult /impossible in certain areas     Will place chair orders and send referrals to the [place] area    Patient can get iHD at [facility] ; however, due to the above difficulties an OP chair /center will need to be obtained prior to rehab    These often will require P2P phone calls with HD Center Medical Directors and [facility] Nephrologist/Cardiologist    Currently patient is making some urine with IV Lasix challenge and has unmeasured urine OP as well    Nephrology following closely will get intermittent updates on Renal function progress

## 2023-12-27 NOTE — PROGRESS NOTES
Pt aaox3 while sitting up in bed with wife at bedside.  LVAD history interrogated with no abnormal events noted.  LVAD numbers WNL. Approximately 40 was spent with the patient providing education. Pt denies any needs at this time. LVAD education continued at bedside with wife and patient. Patient educated on below information:  workbook, encouraged patient to work through while learning.   : Reviewed in depth the . Patient was shown the display button, silence button, and battery button and what each button does including silencing alarms, checking battery status, and toggle through LCD screen. Reviewed how to preform self  test and review last 6 alarms. Also reviewed with patient the lights and sounds the controller makes during alarms. Patient still had trouble naming parts of the controller but knew what they did   EMERGENCY BAG: Reviewed with patient the importance of emergency bag. Showed patient what should be in the bag at all times including backup controller, 2 batteries, 2 clips, and emergency cards. Reviewed that patient should have bag with them at all times. Patient called bag backup bag but struggled to know what goes in it, wife was able to verbalize all peices  BATTERIES AND : Reviewed with patient the battery charger, batteries, and battery clips. Patient was shown how to properly charge batteries, discussed battery rotation, and proper connection on battery clips. Demonstrated how to check battery level on both battery and . Reviewed  alarms and lights and how to troubleshoot issues. Reviewed how long batteries last and calibration.   MPU: Reviewed with patient their Mobile Power Unit (MPU). Patient was shown how to connect to unit, educated on what patient should keep next to their bed and why-- emergency bag and flashlight, Reviewed what lights and alarms on MPU mean and how to troubleshoot. Explained never switching between MPU and power  module. Explained if patient is to get an alarm to switch over to batteries immediatly.   DEVICE PARTS: Educated patient on Pieces of the LVAD device. Reviewed , modular cable, tether cable, batteries and clips, battery charger, and MPU.  Patient was not able to verbalize all correct answers regarding above information to VAD Coordinator but about half the questions he was able to get right. Pt verbalized understanding. Will continue to work with patient over the week. Encouraged pt to notify nurse if they have any questions, problems or concerns for LVAD coordinator.  Pt verbalized understanding and in agreement of plan. Will follow up with pt soon.    PATIENT IS NOT CHECKED OFF ON ALARMS  CAREGIVER Arlyn IS CHECKED OFF ON DRESSING CHANGE

## 2023-12-28 PROBLEM — M79.671 RIGHT FOOT PAIN: Status: RESOLVED | Noted: 2023-12-20 | Resolved: 2023-12-28

## 2023-12-28 LAB
ALBUMIN SERPL BCP-MCNC: 1.9 G/DL (ref 3.5–5.2)
ALP SERPL-CCNC: 179 U/L (ref 55–135)
ALT SERPL W/O P-5'-P-CCNC: 11 U/L (ref 10–44)
ANION GAP SERPL CALC-SCNC: 14 MMOL/L (ref 8–16)
AST SERPL-CCNC: 37 U/L (ref 10–40)
BASOPHILS # BLD AUTO: 0.07 K/UL (ref 0–0.2)
BASOPHILS NFR BLD: 0.7 % (ref 0–1.9)
BILIRUB DIRECT SERPL-MCNC: 0.2 MG/DL (ref 0.1–0.3)
BILIRUB SERPL-MCNC: 0.4 MG/DL (ref 0.1–1)
BUN SERPL-MCNC: 69 MG/DL (ref 8–23)
CA-I BLDV-SCNC: 1.17 MMOL/L (ref 1.06–1.42)
CA-I BLDV-SCNC: 1.17 MMOL/L (ref 1.06–1.42)
CALCIUM SERPL-MCNC: 8.7 MG/DL (ref 8.7–10.5)
CHLORIDE SERPL-SCNC: 100 MMOL/L (ref 95–110)
CO2 SERPL-SCNC: 23 MMOL/L (ref 23–29)
CREAT SERPL-MCNC: 4.9 MG/DL (ref 0.5–1.4)
DIFFERENTIAL METHOD BLD: ABNORMAL
EOSINOPHIL # BLD AUTO: 0.2 K/UL (ref 0–0.5)
EOSINOPHIL NFR BLD: 1.5 % (ref 0–8)
ERYTHROCYTE [DISTWIDTH] IN BLOOD BY AUTOMATED COUNT: 18 % (ref 11.5–14.5)
EST. GFR  (NO RACE VARIABLE): 12.7 ML/MIN/1.73 M^2
GLUCOSE SERPL-MCNC: 113 MG/DL (ref 70–110)
HAV IGM SERPL QL IA: NORMAL
HBV CORE AB SERPL QL IA: NORMAL
HBV SURFACE AB SER-ACNC: 928.86 MIU/ML
HBV SURFACE AB SER-ACNC: REACTIVE M[IU]/ML
HBV SURFACE AG SERPL QL IA: NORMAL
HCT VFR BLD AUTO: 21.6 % (ref 40–54)
HGB BLD-MCNC: 7 G/DL (ref 14–18)
IMM GRANULOCYTES # BLD AUTO: 0.05 K/UL (ref 0–0.04)
IMM GRANULOCYTES NFR BLD AUTO: 0.5 % (ref 0–0.5)
INR PPP: 2.8 (ref 0.8–1.2)
LDH SERPL L TO P-CCNC: 382 U/L (ref 110–260)
LYMPHOCYTES # BLD AUTO: 0.7 K/UL (ref 1–4.8)
LYMPHOCYTES NFR BLD: 6.7 % (ref 18–48)
MAGNESIUM SERPL-MCNC: 2.2 MG/DL (ref 1.6–2.6)
MCH RBC QN AUTO: 28.9 PG (ref 27–31)
MCHC RBC AUTO-ENTMCNC: 32.4 G/DL (ref 32–36)
MCV RBC AUTO: 89 FL (ref 82–98)
MONOCYTES # BLD AUTO: 0.6 K/UL (ref 0.3–1)
MONOCYTES NFR BLD: 5.5 % (ref 4–15)
NEUTROPHILS # BLD AUTO: 8.6 K/UL (ref 1.8–7.7)
NEUTROPHILS NFR BLD: 85.1 % (ref 38–73)
NRBC BLD-RTO: 0 /100 WBC
PHOSPHATE SERPL-MCNC: 3.4 MG/DL (ref 2.7–4.5)
PLATELET # BLD AUTO: 438 K/UL (ref 150–450)
PMV BLD AUTO: 9.6 FL (ref 9.2–12.9)
POCT GLUCOSE: 137 MG/DL (ref 70–110)
POCT GLUCOSE: 138 MG/DL (ref 70–110)
POCT GLUCOSE: 139 MG/DL (ref 70–110)
POCT GLUCOSE: 155 MG/DL (ref 70–110)
POCT GLUCOSE: 162 MG/DL (ref 70–110)
POCT GLUCOSE: 189 MG/DL (ref 70–110)
POTASSIUM SERPL-SCNC: 3.9 MMOL/L (ref 3.5–5.1)
PROT SERPL-MCNC: 6.7 G/DL (ref 6–8.4)
PROTHROMBIN TIME: 28.2 SEC (ref 9–12.5)
RBC # BLD AUTO: 2.42 M/UL (ref 4.6–6.2)
SODIUM SERPL-SCNC: 137 MMOL/L (ref 136–145)
WBC # BLD AUTO: 10.11 K/UL (ref 3.9–12.7)

## 2023-12-28 PROCEDURE — 25000003 PHARM REV CODE 250

## 2023-12-28 PROCEDURE — 97530 THERAPEUTIC ACTIVITIES: CPT

## 2023-12-28 PROCEDURE — 85025 COMPLETE CBC W/AUTO DIFF WBC: CPT | Performed by: INTERNAL MEDICINE

## 2023-12-28 PROCEDURE — 85610 PROTHROMBIN TIME: CPT | Performed by: INTERNAL MEDICINE

## 2023-12-28 PROCEDURE — 80048 BASIC METABOLIC PNL TOTAL CA: CPT | Performed by: NURSE PRACTITIONER

## 2023-12-28 PROCEDURE — 80076 HEPATIC FUNCTION PANEL: CPT | Performed by: INTERNAL MEDICINE

## 2023-12-28 PROCEDURE — 25000003 PHARM REV CODE 250: Performed by: INTERNAL MEDICINE

## 2023-12-28 PROCEDURE — 27000646 HC AEROBIKA DEVICE

## 2023-12-28 PROCEDURE — 82330 ASSAY OF CALCIUM: CPT | Performed by: STUDENT IN AN ORGANIZED HEALTH CARE EDUCATION/TRAINING PROGRAM

## 2023-12-28 PROCEDURE — 84100 ASSAY OF PHOSPHORUS: CPT | Performed by: INTERNAL MEDICINE

## 2023-12-28 PROCEDURE — 93750 INTERROGATION VAD IN PERSON: CPT | Mod: ,,, | Performed by: INTERNAL MEDICINE

## 2023-12-28 PROCEDURE — 27000248 HC VAD-ADDITIONAL DAY

## 2023-12-28 PROCEDURE — 51798 US URINE CAPACITY MEASURE: CPT

## 2023-12-28 PROCEDURE — 99900035 HC TECH TIME PER 15 MIN (STAT)

## 2023-12-28 PROCEDURE — 25000003 PHARM REV CODE 250: Performed by: PHYSICIAN ASSISTANT

## 2023-12-28 PROCEDURE — 97116 GAIT TRAINING THERAPY: CPT

## 2023-12-28 PROCEDURE — 99232 SBSQ HOSP IP/OBS MODERATE 35: CPT | Mod: FS,,, | Performed by: STUDENT IN AN ORGANIZED HEALTH CARE EDUCATION/TRAINING PROGRAM

## 2023-12-28 PROCEDURE — 94761 N-INVAS EAR/PLS OXIMETRY MLT: CPT

## 2023-12-28 PROCEDURE — 63600175 PHARM REV CODE 636 W HCPCS: Performed by: PHYSICIAN ASSISTANT

## 2023-12-28 PROCEDURE — 94799 UNLISTED PULMONARY SVC/PX: CPT

## 2023-12-28 PROCEDURE — 83615 LACTATE (LD) (LDH) ENZYME: CPT | Performed by: STUDENT IN AN ORGANIZED HEALTH CARE EDUCATION/TRAINING PROGRAM

## 2023-12-28 PROCEDURE — 83735 ASSAY OF MAGNESIUM: CPT | Performed by: INTERNAL MEDICINE

## 2023-12-28 PROCEDURE — 25000003 PHARM REV CODE 250: Performed by: NURSE PRACTITIONER

## 2023-12-28 PROCEDURE — 25000003 PHARM REV CODE 250: Performed by: STUDENT IN AN ORGANIZED HEALTH CARE EDUCATION/TRAINING PROGRAM

## 2023-12-28 PROCEDURE — 99233 SBSQ HOSP IP/OBS HIGH 50: CPT | Mod: ,,, | Performed by: PHYSICIAN ASSISTANT

## 2023-12-28 PROCEDURE — 80100014 HC HEMODIALYSIS 1:1

## 2023-12-28 PROCEDURE — 94664 DEMO&/EVAL PT USE INHALER: CPT

## 2023-12-28 PROCEDURE — 20600001 HC STEP DOWN PRIVATE ROOM

## 2023-12-28 RX ORDER — SODIUM CHLORIDE 9 MG/ML
INJECTION, SOLUTION INTRAVENOUS ONCE
Status: DISCONTINUED | OUTPATIENT
Start: 2023-12-28 | End: 2023-12-29

## 2023-12-28 RX ORDER — FAMOTIDINE 20 MG/1
20 TABLET, FILM COATED ORAL DAILY
Status: DISCONTINUED | OUTPATIENT
Start: 2023-12-28 | End: 2024-01-13

## 2023-12-28 RX ORDER — HEPARIN SODIUM 1000 [USP'U]/ML
1000 INJECTION, SOLUTION INTRAVENOUS; SUBCUTANEOUS
Status: DISCONTINUED | OUTPATIENT
Start: 2023-12-28 | End: 2024-01-23

## 2023-12-28 RX ADMIN — WARFARIN SODIUM 2.5 MG: 2.5 TABLET ORAL at 05:12

## 2023-12-28 RX ADMIN — MIDODRINE HYDROCHLORIDE 5 MG: 5 TABLET ORAL at 02:12

## 2023-12-28 RX ADMIN — INSULIN ASPART 2 UNITS: 100 INJECTION, SOLUTION INTRAVENOUS; SUBCUTANEOUS at 09:12

## 2023-12-28 RX ADMIN — ACETAMINOPHEN 1000 MG: 500 TABLET ORAL at 08:12

## 2023-12-28 RX ADMIN — INSULIN ASPART 4 UNITS: 100 INJECTION, SOLUTION INTRAVENOUS; SUBCUTANEOUS at 01:12

## 2023-12-28 RX ADMIN — ACETAMINOPHEN 1000 MG: 500 TABLET ORAL at 09:12

## 2023-12-28 RX ADMIN — FUROSEMIDE 80 MG: 10 INJECTION, SOLUTION INTRAVENOUS at 08:12

## 2023-12-28 RX ADMIN — GABAPENTIN 125 MG: 250 SOLUTION ORAL at 08:12

## 2023-12-28 RX ADMIN — INSULIN ASPART 4 UNITS: 100 INJECTION, SOLUTION INTRAVENOUS; SUBCUTANEOUS at 05:12

## 2023-12-28 RX ADMIN — VENLAFAXINE 37.5 MG: 37.5 TABLET ORAL at 09:12

## 2023-12-28 RX ADMIN — GABAPENTIN 125 MG: 250 SOLUTION ORAL at 09:12

## 2023-12-28 RX ADMIN — MIDODRINE HYDROCHLORIDE 5 MG: 5 TABLET ORAL at 09:12

## 2023-12-28 RX ADMIN — INSULIN ASPART 4 UNITS: 100 INJECTION, SOLUTION INTRAVENOUS; SUBCUTANEOUS at 09:12

## 2023-12-28 RX ADMIN — INSULIN ASPART 2 UNITS: 100 INJECTION, SOLUTION INTRAVENOUS; SUBCUTANEOUS at 01:12

## 2023-12-28 RX ADMIN — MIRTAZAPINE 15 MG: 15 TABLET, FILM COATED ORAL at 08:12

## 2023-12-28 RX ADMIN — MIDODRINE HYDROCHLORIDE 5 MG: 5 TABLET ORAL at 08:12

## 2023-12-28 RX ADMIN — FUROSEMIDE 80 MG: 10 INJECTION, SOLUTION INTRAVENOUS at 09:12

## 2023-12-28 RX ADMIN — ACETAMINOPHEN 1000 MG: 500 TABLET ORAL at 02:12

## 2023-12-28 RX ADMIN — Medication: at 09:12

## 2023-12-28 RX ADMIN — AMIODARONE HYDROCHLORIDE 400 MG: 200 TABLET ORAL at 09:12

## 2023-12-28 RX ADMIN — Medication: at 08:12

## 2023-12-28 RX ADMIN — TRAMADOL HYDROCHLORIDE 50 MG: 50 TABLET, COATED ORAL at 09:12

## 2023-12-28 RX ADMIN — FAMOTIDINE 20 MG: 20 TABLET, FILM COATED ORAL at 09:12

## 2023-12-28 RX ADMIN — ZOLPIDEM TARTRATE 5 MG: 5 TABLET ORAL at 09:12

## 2023-12-28 RX ADMIN — SENNOSIDES AND DOCUSATE SODIUM 2 TABLET: 8.6; 5 TABLET ORAL at 09:12

## 2023-12-28 NOTE — NURSING
Notified Justin . MD CVP 11,  pt urinated 10 mL during the shift, bladder scan result is 70 mL, and pt is getting hemodialysis today. NO new order received, WCTM.

## 2023-12-28 NOTE — NURSING
Wound care done on patient's sacral pressure wound and right heel. Sacral area cleansed with wound cleanser and  balsam peru-castor oil  ointment and sacral foam(mepilex) dressing applied. Balsam peru-castor oil applied to R heel with foam heel protectors (mepilex) applied bilaterally.

## 2023-12-28 NOTE — PROGRESS NOTES
"Wyqva3vk HD initiated, / as ordered. Uf net set  for net of 2 liters as tolerated.  B/P 83/56 pulse 91 o2 sat on room air 97%Implant Date:12/1/23  Initials:LxH    Heartmate 3 RPM 4900    INR goal: 2-3   Bridge with Heparin   Antiplatelets: None  Inotropes:    GIB:  Integrelin/TPA:  Stroke:    DLES:"2 with suture"  ABX:   Dressing: Q3 days with Silver Kit x3 months;   @ 3 months discuss weekly w/ silver disc 3/2024  @ 6 months discus stopping silver 6/2024    Appts: Weekly x 3,  then every other week x 2, then monthly  Home Health:     Speed changes  Date-Speed   12/1/23: 4800   12/7/23: 4900           Comments:     Followed by [facility]: yes    Cardiac Rehab Order Placed within 90 Days Post Implant:   Due between: 1/12/24-2/29/24  (Must be 6 weeks post implant)  Date completed:     Last Echo:12/11/2023 Due: 6/2024 (Every 6 months)  Last Lipids: 11/15/2023 Due: 5/2024 (Every 6 months)    ON Amiodarone:        Last TSH: 11/15/2023 /T4:  Due: 5/2024 (every 6 months)        Last PFTs:   Due: NOW (every year)        Last Chest Xray: 12/15/2023 Due: 12/2024 (Every year)       Equipment:   Primary Controller: [id number]; EBB S/N: [id number], MFG Date: 9/1/2023; Replace Due: 9/2026  Back up Controller: [facility] [id number]; EBB S/N: [id number], MFG Date: 9/12/2023; Replace Due: 9/2026    Back up Controller charge and self test due: 6/2024    MPU maintenance due: 6/2024  UBC maintenance due: 12/2024             Serial Number                          Expiration Date  1. [id number] 8/2026   2. [id number] 8/2026   3. [id number] 8/2026   4. [id number] 8/2026   5. [id number] 8/2026   6. [id number] 8/2026   7. [id number] 8/2026   8. [id number] 8/2026     Comments:   Alert    "

## 2023-12-28 NOTE — PLAN OF CARE
VSS. BG monitored. Spouse at bedside. Tube feeding set and feeding bag changed around 0500. Bladder scan is 70 mL, team aware. Wound care provided.  Pt educated on fall risk and remained free from falls/trauma/injury. Denies chest pain, SOB, palpitations, dizziness, pain, or discomfort. Plan of care reviewed with pt, all questions answered. Bed locked in lowest position, call bell within reach, no acute distress noted, will continue to monitor.

## 2023-12-28 NOTE — PROGRESS NOTES
[facility] - Cardiology Stepdown  Nephrology  Progress Note    Patient Name: Radha Abbott  MRN: [id number]  Admission Date: 11/9/2023  Hospital Length of Stay: 49 days  Attending Provider: Brayan Ocampo MD   Primary Care Physician: Vasu Kong MD  Principal Problem:LVAD (left ventricular assist device) present    Subjective:     Interval History:   RRT held on yesterday. Electrolytes non emergent. CVP 11. UOP remain minium on Lasix. No distress on exam. Up to bedside chair with PT/OT.     Review of patient's allergies indicates:  No Known Allergies  Current Facility-Administered Medications   Medication Frequency    0.9%  NaCl infusion Continuous    0.9%  NaCl infusion Once    acetaminophen tablet 1,000 mg TID    amiodarone tablet 400 mg Daily    balsam peru-castor oiL Oint BID    bisacodyL suppository 10 mg Daily PRN    dextrose 10 % infusion Continuous PRN    dextrose 10% bolus 125 mL 125 mL PRN    dextrose 10% bolus 250 mL 250 mL PRN    famotidine tablet 20 mg Daily    furosemide injection 80 mg BID    gabapentin 250 mg/5 mL (5 mL) solution 125 mg Q12H    glucagon (human recombinant) injection 1 mg PRN    hydrALAZINE injection 10 mg Q6H PRN    insulin aspart U-100 pen 0-10 Units Q4H PRN    insulin aspart U-100 pen 4 Units 6 times per day    midodrine tablet 5 mg TID    mirtazapine tablet 15 mg Nightly    ondansetron injection 4 mg Q6H PRN    polyethylene glycol packet 17 g Daily    psyllium husk (aspartame) 3.4 gram packet 1 packet Daily    senna-docusate 8.6-50 mg per tablet 2 tablet Daily    sodium chloride 0.9% flush 10 mL PRN    traMADoL tablet 50 mg Q8H PRN    venlafaxine tablet 37.5 mg Daily    warfarin (COUMADIN) tablet 2.5 mg Every Tues, Thurs, Sat, Sun    warfarin (COUMADIN) tablet 5 mg Every Mon, Wed, Fri    zolpidem tablet 5 mg Nightly PRN       Objective:     Vital Signs (Most Recent):  Temp: 97.3 °F (36.3 °C) (12/28/23 0746)  Pulse: 98 (12/28/23 0600)  Resp: 17 (12/28/23 0935)  BP: (!) 94/0  (12/28/23 0746)  SpO2: 95 % (12/28/23 0925) Vital Signs (24h Range):  Temp:  [97 °F (36.1 °C)-98 °F (36.7 °C)] 97.3 °F (36.3 °C)  Pulse:  [85-98] 98  Resp:  [17-20] 17  SpO2:  [95 %-100 %] 95 %  BP: ()/(0-86) 94/0     Weight:  (patient not able to obtain weight bedscale not working patient unable to stand) (12/27/23 0725)  Body mass index is 24.55 kg/m².  Body surface area is 2.04 meters squared.    I/O last 3 completed shifts:  In: 675 [NG/GT:675]  Out: -      Physical Exam  Vitals and nursing note reviewed.   HENT:      Head: Normocephalic and atraumatic.      Mouth/Throat:      Mouth: Mucous membranes are dry.   Eyes:      Conjunctiva/sclera: Conjunctivae normal.   Cardiovascular:      Comments: LVAD   Pulmonary:      Effort: Pulmonary effort is normal.      Comments: RA  Abdominal:      Palpations: Abdomen is soft.   Musculoskeletal:         General: No swelling.      Right lower leg: No edema.      Left lower leg: No edema.   Skin:     General: Skin is warm.   Neurological:      Mental Status: He is alert and oriented to person, place, and time.   Psychiatric:         Mood and Affect: Mood normal.         Behavior: Behavior normal.          Significant Labs:  CBC:   Recent Labs   Lab 12/28/23  0507   WBC 10.11   RBC 2.42*   HGB 7.0*   HCT 21.6*      MCV 89   MCH 28.9   MCHC 32.4     CMP:   Recent Labs   Lab 12/28/23  0507   *   CALCIUM 8.7   ALBUMIN 1.9*   PROT 6.7      K 3.9   CO2 23      BUN 69*   CREATININE 4.9*   ALKPHOS 179*   ALT 11   AST 37   BILITOT 0.4     All labs within the past 24 hours have been reviewed.     Assessment/Plan:     Cardiac/Vascular  * LVAD (left ventricular assist device) present  -management per primary     Acute on chronic combined systolic and diastolic CHF, NYHA class 4  - management per primary team  - LVAD for mechanical support    Renal/  Acute renal failure with acute tubular necrosis superimposed on stage 3b chronic kidney disease  - suspect  acute tubular injury secondary to hypotension/cardiogenic shock likely compounded by intra-arterial contrast and anemia with urinary sediment with granular casts    -Will plan for iHD today for clearance and volume management. Target UF 1-2 L as tolerated.   -Recommend dc'd Lasix IV - no evidence of signs of renal recovery.   -Will continue TuThSat HD while inpatient.   -Continue bladder scans q shift  -strict I/O's  -renal diet/tube feeds when not NPO, volume restriction per primary team  -renally all dose medications to eGFR  -avoid nephrotoxic agents wean feasible (i.e. NSAIDs, intra-arterial contrast, supra-therapeutic vancomycin levels, etc.)          Thank you for your consult. I will follow-up with patient. Please contact us if you have any additional questions.    Matilda Hinton DNP, FNP-C  Nephrology  [facility] - Cardiology Stepdown

## 2023-12-28 NOTE — SUBJECTIVE & OBJECTIVE
Interval History: No complaints or overnight events. Yesterday afternoon pulsatile BP was elevated (MAP in 90s) so midodrine weaned to 5 mg TID. Due to get HD today. CVP 11 this AM. To remain NPO w/ TF via NGT until repeat MBSS next week.     Continuous Infusions:   sodium chloride 0.9%      dextrose 10 % in water (D10W)       Scheduled Meds:   sodium chloride 0.9%   Intravenous Once    acetaminophen  1,000 mg Per NG tube TID    amiodarone  400 mg Per NG tube Daily    balsam peru-castor oiL   Topical (Top) BID    famotidine  20 mg Per G Tube Daily    furosemide (LASIX) injection  80 mg Intravenous BID    gabapentin  125 mg Per NG tube Q12H    insulin aspart U-100  4 Units Subcutaneous 6 times per day    midodrine  5 mg Per NG tube TID    mirtazapine  15 mg Per NG tube Nightly    polyethylene glycol  17 g Per NG tube Daily    psyllium husk (aspartame)  1 packet Per NG tube Daily    senna-docusate 8.6-50 mg  2 tablet Per NG tube Daily    venlafaxine  37.5 mg Per G Tube Daily    warfarin  2.5 mg Per G Tube Every Tues, Thurs, Sat, Sun    warfarin  5 mg Per G Tube Every Mon, Wed, Fri     PRN Meds:bisacodyL, dextrose 10 % in water (D10W), dextrose 10%, dextrose 10%, glucagon (human recombinant), hydrALAZINE, insulin aspart U-100, ondansetron, Flushing PICC/Midline Protocol **AND** [DISCONTINUED] sodium chloride 0.9% **AND** sodium chloride 0.9%, traMADoL, zolpidem    Review of patient's allergies indicates:  No Known Allergies  Objective:     Vital Signs (Most Recent):  Temp: 97.4 °F (36.3 °C) (12/28/23 1122)  Pulse: 89 (12/28/23 1122)  Resp: 18 (12/28/23 1122)  BP: 104/74 (12/28/23 1122)  SpO2: (!) 94 % (12/28/23 1122) Vital Signs (24h Range):  Temp:  [97 °F (36.1 °C)-98 °F (36.7 °C)] 97.4 °F (36.3 °C)  Pulse:  [85-98] 89  Resp:  [17-20] 18  SpO2:  [94 %-100 %] 94 %  BP: ()/(0-77) 104/74     No data found.  Body mass index is 24.55 kg/m².      Intake/Output Summary (Last 24 hours) at 12/28/2023 1151  Last data  filed at 12/28/2023 0800  Gross per 24 hour   Intake 150 ml   Output --   Net 150 ml         Hemodynamic Parameters:  CVP:  [11 mmHg] 11 mmHg    Telemetry: SR       Physical Exam  Constitutional:       Comments: Cachectic, temporal wasting   HENT:      Head: Normocephalic and atraumatic.   Eyes:      Conjunctiva/sclera: Conjunctivae normal.      Pupils: Pupils are equal, round, and reactive to light.   Neck:      Comments: Do not appreciate elevated JVP  Cardiovascular:      Rate and Rhythm: Normal rate and regular rhythm.      Comments: Smooth VAD hum  Pulmonary:      Effort: Pulmonary effort is normal.      Breath sounds: Normal breath sounds.   Abdominal:      General: Bowel sounds are normal.      Palpations: Abdomen is soft.   Musculoskeletal:         General: No swelling. Normal range of motion.      Cervical back: Normal range of motion and neck supple.   Skin:     General: Skin is warm and dry.      Capillary Refill: Capillary refill takes 2 to 3 seconds.   Neurological:      General: No focal deficit present.      Mental Status: He is alert and oriented to person, place, and time.            Significant Labs:  CBC:  Recent Labs   Lab 12/26/23 0449 12/26/23 0456 12/27/23 0451 12/28/23  0507   WBC 11.18  --  10.33 10.11   RBC 2.54*  --  2.40* 2.42*   HGB 7.3*  --  6.9* 7.0*   HCT 24.2* 24* 22.1* 21.6*   *  --  471* 438   MCV 95  --  92 89   MCH 28.7  --  28.8 28.9   MCHC 30.2*  --  31.2* 32.4       BNP:  Recent Labs   Lab 12/22/23 0410 12/25/23  0503 12/27/23  0450   BNP 1,628* 2,604* 1,779*       CMP:  Recent Labs   Lab 12/26/23 0449 12/27/23  0450 12/28/23  0507   * 150* 113*   CALCIUM 9.8 8.2* 8.7   ALBUMIN 1.9* 1.8* 1.9*   PROT 6.8 6.4 6.7   * 137 137   K 5.1 3.9 3.9   CO2 19* 25 23    102 100   BUN 85* 49* 69*   CREATININE 5.4* 3.6* 4.9*   ALKPHOS 199* 174* 179*   ALT 10 10 11   AST 44* 38 37   BILITOT 0.5 0.4 0.4        Coagulation:   Recent Labs   Lab 12/26/23  8715  12/27/23  0450 12/28/23  0507   INR 3.4* 3.1* 2.8*       LDH:  Recent Labs   Lab 12/26/23  0449 12/27/23  0450 12/28/23  0507   * 376* 382*       Microbiology:  Microbiology Results (last 7 days)       ** No results found for the last 168 hours. **            I have reviewed all pertinent labs within the past 24 hours.    Estimated Creatinine Clearance: 17.4 mL/min (A) (based on SCr of 4.9 mg/dL (H)).    Diagnostic Results:  I have reviewed and interpreted all pertinent imaging results/findings within the past 24 hours.

## 2023-12-28 NOTE — PROGRESS NOTES
Rounded on patient at bedside. Patient is resting calmly after working with therapy and having dialysis.  LVAD history interrogated with no abnormal events noted.  LVAD numbers WNL compared to baseline. Pt's wife denies any needs at this time. Patient too exhausted for education today.  Encouraged pt's wife to notify nurse if they have any questions, problems or concerns for LVAD coordinator.  Pt verbalized understanding and in agreement of plan. Plan for team to continue to round on patient.

## 2023-12-28 NOTE — PT/OT/SLP PROGRESS
Physical Therapy Treatment    Patient Name:  Radha Abbott   MRN:  [id number]    Recommendations:     Discharge Recommendations: High Intensity Therapy  Discharge Equipment Recommendations:  (will determine DME Needs closer to discharge)  Barriers to discharge: Decreased caregiver support    Assessment:     Radha Abbott is a 63 y.o. male admitted with a medical diagnosis of LVAD (left ventricular assist device) present.  He presents with the following impairments/functional limitations: weakness, gait instability, impaired endurance, impaired balance, decreased lower extremity function pt tolerated treatment better being able to gait train farther. Pt will benefit from cont skilled PT 6x/wk to progress physically.  Pt is significantly below previous functional level, increased risk of falls and increased burden of care currently. Patient has demonstrated sufficient progression to warrant high intensity therapy evidenced by objectives noted below. Pt is s/p LVAD placement 12/1 with sternal closure 12/4.    Rehab Prognosis: Good; patient would benefit from acute skilled PT services to address these deficits and reach maximum level of function.    Recent Surgery: Procedure(s) (LRB):  CLOSURE, WOUND, STERNUM (N/A)  INSERTION, GRAFT, PERICARDIUM  DRAINAGE, PLEURAL EFFUSION 24 Days Post-Op    Plan:     During this hospitalization, patient to be seen 6 x/week to address the identified rehab impairments via gait training, therapeutic activities and progress toward the following goals:    Plan of Care Expires:  01/04/24    Subjective     Chief Complaint: pt c/o feeling weak today.   Patient/Family Comments/goals: to get stronger and go home.   Pain/Comfort:  Pain Rating 1: 0/10  Pain Rating Post-Intervention 1: 0/10      Objective:     Communicated with nurse  prior to session.  Patient found sitting edge of bed with NG tube, PICC line, LVAD, PureWick upon PT entry to room.     General Precautions: Standard, fall, LVAD,  sternal  Orthopedic Precautions: N/A  Braces: N/A  Respiratory Status: Room air     Functional Mobility:  Transfers:  pt needed verbal cues for functional mobility with sternal precautions.    Sit to Stand:  maximal assistance and of 2 persons with B platform RW. Pt stood x 2 reps. Pt was max assist x 2 to weight shift forward and get center of gravity over base of support.     Bed to Chair: maximal assistance and of 2 persons with  hand-held assist  using  Squat Pivot  Chair to bed max assist squat pivot    Pt sat in chair x 2 hours.     Gait: pt received gait training ~ 22 ft then 20 ft  (42 ft total) with moderate assist, B platform RW. Pt needed verbal and tactile cues for upright posture and to increase base of support with gait training. Pt had sitting rest period between distances gait trained. Pt was additional assist of 1 to follow with chair and had emergency bag present.     Balance: pt sat on EOB with SBA.     OT assisted pt to transfer from LVAD  power base to battery.     Pt white board updated with current therapists name and level of mobility assistance needed.       AM-PAC 6 CLICK MOBILITY  Turning over in bed (including adjusting bedclothes, sheets and blankets)?: 2  Sitting down on and standing up from a chair with arms (e.g., wheelchair, bedside commode, etc.): 2  Moving from lying on back to sitting on the side of the bed?: 2  Moving to and from a bed to a chair (including a wheelchair)?: 2  Need to walk in hospital room?: 2  Climbing 3-5 steps with a railing?: 1  Basic Mobility Total Score: 11       Treatment & Education:  Pt and wife received verbal instructions in PT POC and both verbally expressed understanding of such.     Patient left up in chair with all lines intact, call button in reach, and wife present..    GOALS:   Multidisciplinary Problems       Physical Therapy Goals          Problem: Physical Therapy    Goal Priority Disciplines Outcome Goal Variances Interventions   Physical  Therapy Goal     PT, PT/OT Ongoing, Progressing     Description: Goals to be met by: 23     Patient will increase functional independence with mobility by performin. Sit to stand transfer with modified independence  2. Bed to chair transfer with supervision  3. Gait  x 150 feet with supervision using physician approved AD with standing rest breaks prn.   4. Lower extremity exercise program x30 reps per handout, with independence  5. Recite 3/3 sternal precautions and remain complaint with precautions throughout session with no verbal cues                     Multidisciplinary Problems (Resolved)          Problem: Physical Therapy    Goal Priority Disciplines Outcome Goal Variances Interventions   Physical Therapy Goal   (Resolved)     PT, PT/OT Met     Description: Goals to be met by: 23     Patient will increase functional independence with mobility by performin. Evaluate pt's need for physical therapy.                          Time Tracking:     PT Received On: 23  PT Start Time: 0840     PT Stop Time: 0900  PT Total Time (min): 20 min     2nd PT start time: 10:58  2nd PT end time: 11:03  ( 20 min + 5 min = 25 min total)     Billable Minutes: Gait Training 15 min  and Therapeutic Activity 10 min     Treatment Type: Treatment  PT/PTA: PT     Number of PTA visits since last PT visit: 0     2023

## 2023-12-28 NOTE — CARE UPDATE
-Glucose Goal 140-180    -A1C:   Hemoglobin A1C   Date Value Ref Range Status   10/12/2023 7.6 (H) 4.0 - 5.6 % Final     Comment:     ADA Screening Guidelines:  5.7-6.4%  Consistent with prediabetes  >or=6.5%  Consistent with diabetes    High levels of fetal hemoglobin interfere with the HbA1C  assay. Heterozygous hemoglobin variants (HbS, HgC, etc)do  not significantly interfere with this assay.   However, presence of multiple variants may affect accuracy.           -HOME REGIMEN: metformin     -GLUCOSE TREND FOR THE PAST 24HRS:   Recent Labs   Lab 12/27/23  0802 12/27/23  1306 12/27/23  1645 12/27/23  2150 12/28/23  0140 12/28/23  0520   POCTGLUCOSE 110 142* 118* 174* 155* 139*           -NO HYPOGYCEMIAS NOTED     - Diet  Diet NPO Except for: Medication (per NG tube)    -Steroids - none     -Tube Feeds - TF @ 45ml/hr        Plan:   - Novolog 4 units q 4 hr to cover TF- hold if TF held or BG < 100   - POCT Glucose every 4 hours  - Hypoglycemia protocol in place      ** Please notify Endocrine for any change and/or advance in diet**  ** Please call Endocrine for any BG related issues **     Discharge Planning:   TBD. Please notify endocrinology prior to discharge.         Erasmo Parrish DNP, FNP-C  Department of Endocrinology  Inpatient Glycemic Management

## 2023-12-28 NOTE — PROGRESS NOTES
12/28/23 0746   Vital Signs   BP (!) 94/0   BP Location Left arm   BP Method Doppler   Patient Position Lying         Spoke with DENIZ An about AM dose of midodrine since Doppler elevated, unable to obtain cuff pressure. Pt intermittently pulsatile. Per PA ok to give midodrine since it is HD day and his pressures will likely drop some with HD.

## 2023-12-28 NOTE — ASSESSMENT & PLAN NOTE
Pt with known ICM with an EF of 25% on home  presented with decompensated HF.      -lRHC 11/14 RA 14, PA 70/35, PCWP 32, CO 4.1, CI 2.1  -Repeat RHC 11/20 RA 20, PA 60/29 (39), PCWP 29, CO 4.6, CI 2.3  -ECHO 11/21 showed EF 15-20%, mild RV dysfunction, mild MR, LVED 6.9   -Home  5 has been weaned off  -Home GDMT: Entreso 24/26 BID (on hold 2/2 IVÁN), hydralazine 25 q 8hr, all held prior to LVAD implant  -Home diuretic: Lasix 40 mg BID   -IABP inserted 11/21 and underwent DT HM3 implant 12/1, chest closed 12/4  -LVAD speed increased to 4900 rpm on 12/7   -ECHO 12/11 AV does not open, IVS midline, LVEDD 6.1, with HM3 speed set to 4900 rpm  -CVP 11. Due to get HD today  -No significant hypotension. Remains on Midodrine, weaned to 5 mg TID yesterday, briefly hypotensive with HD earlier so keeping on midodrine for today

## 2023-12-28 NOTE — ASSESSMENT & PLAN NOTE
- suspect acute tubular injury secondary to hypotension/cardiogenic shock likely compounded by intra-arterial contrast and anemia with urinary sediment with granular casts    -Will plan for iHD today for clearance and volume management. Target UF 1-2 L as tolerated.   -Recommend dc'd Lasix IV - no evidence of signs of renal recovery.   -Will continue TuThSat HD while inpatient.   -Continue bladder scans q shift  -strict I/O's  -renal diet/tube feeds when not NPO, volume restriction per primary team  -renally all dose medications to eGFR  -avoid nephrotoxic agents wean feasible (i.e. NSAIDs, intra-arterial contrast, supra-therapeutic vancomycin levels, etc.)

## 2023-12-28 NOTE — SUBJECTIVE & OBJECTIVE
Interval History:   RRT held on yesterday. Electrolytes non emergent. CVP 11. UOP remain minium on Lasix. No distress on exam. Up to bedside chair with PT/OT.     Review of patient's allergies indicates:  No Known Allergies  Current Facility-Administered Medications   Medication Frequency    0.9%  NaCl infusion Continuous    0.9%  NaCl infusion Once    acetaminophen tablet 1,000 mg TID    amiodarone tablet 400 mg Daily    balsam peru-castor oiL Oint BID    bisacodyL suppository 10 mg Daily PRN    dextrose 10 % infusion Continuous PRN    dextrose 10% bolus 125 mL 125 mL PRN    dextrose 10% bolus 250 mL 250 mL PRN    famotidine tablet 20 mg Daily    furosemide injection 80 mg BID    gabapentin 250 mg/5 mL (5 mL) solution 125 mg Q12H    glucagon (human recombinant) injection 1 mg PRN    hydrALAZINE injection 10 mg Q6H PRN    insulin aspart U-100 pen 0-10 Units Q4H PRN    insulin aspart U-100 pen 4 Units 6 times per day    midodrine tablet 5 mg TID    mirtazapine tablet 15 mg Nightly    ondansetron injection 4 mg Q6H PRN    polyethylene glycol packet 17 g Daily    psyllium husk (aspartame) 3.4 gram packet 1 packet Daily    senna-docusate 8.6-50 mg per tablet 2 tablet Daily    sodium chloride 0.9% flush 10 mL PRN    traMADoL tablet 50 mg Q8H PRN    venlafaxine tablet 37.5 mg Daily    warfarin (COUMADIN) tablet 2.5 mg Every Tues, Thurs, Sat, Sun    warfarin (COUMADIN) tablet 5 mg Every Mon, Wed, Fri    zolpidem tablet 5 mg Nightly PRN       Objective:     Vital Signs (Most Recent):  Temp: 97.3 °F (36.3 °C) (12/28/23 0746)  Pulse: 98 (12/28/23 0600)  Resp: 17 (12/28/23 0935)  BP: (!) 94/0 (12/28/23 0746)  SpO2: 95 % (12/28/23 0925) Vital Signs (24h Range):  Temp:  [97 °F (36.1 °C)-98 °F (36.7 °C)] 97.3 °F (36.3 °C)  Pulse:  [85-98] 98  Resp:  [17-20] 17  SpO2:  [95 %-100 %] 95 %  BP: ()/(0-86) 94/0     Weight:  (patient not able to obtain weight bedscale not working patient unable to stand) (12/27/23 9333)  Body mass  index is 24.55 kg/m².  Body surface area is 2.04 meters squared.    I/O last 3 completed shifts:  In: 675 [NG/GT:675]  Out: -      Physical Exam  Vitals and nursing note reviewed.   HENT:      Head: Normocephalic and atraumatic.      Mouth/Throat:      Mouth: Mucous membranes are dry.   Eyes:      Conjunctiva/sclera: Conjunctivae normal.   Cardiovascular:      Comments: LVAD   Pulmonary:      Effort: Pulmonary effort is normal.      Comments: RA  Abdominal:      Palpations: Abdomen is soft.   Musculoskeletal:         General: No swelling.      Right lower leg: No edema.      Left lower leg: No edema.   Skin:     General: Skin is warm.   Neurological:      Mental Status: He is alert and oriented to person, place, and time.   Psychiatric:         Mood and Affect: Mood normal.         Behavior: Behavior normal.          Significant Labs:  CBC:   Recent Labs   Lab 12/28/23  0507   WBC 10.11   RBC 2.42*   HGB 7.0*   HCT 21.6*      MCV 89   MCH 28.9   MCHC 32.4     CMP:   Recent Labs   Lab 12/28/23  0507   *   CALCIUM 8.7   ALBUMIN 1.9*   PROT 6.7      K 3.9   CO2 23      BUN 69*   CREATININE 4.9*   ALKPHOS 179*   ALT 11   AST 37   BILITOT 0.4     All labs within the past 24 hours have been reviewed.

## 2023-12-28 NOTE — ASSESSMENT & PLAN NOTE
-HeartMate 3 Implanted  12/1/2023  as DT  -HTS Primary  -Implanted by Dr. Washington  -Continue Coumadin, dosing by PharmD.  Goal INR 2.0-3.0 . Therapeutic today. Discontinue Heparin bridge   -Antiplatelets Not on ASA  -LDH is stable overall today. Will continue to monitor daily.  -Speed set at  4900   rpm, LSL 4500 rpm   -Interrogation notable for no events  -ECHO 12/11 AV does not open, IVS midline, LVEDD 6.1, with HM3 speed set to 4900 rpm  -Not listed for OHTx, declined for OHTX due to comorbidities         Procedure: Device Interrogation Including analysis of device parameters  Current Settings: Ventricular Assist Device  Review of device function is stable/unstable stable        12/28/2023     7:45 AM 12/28/2023     4:00 AM 12/28/2023    12:00 AM 12/27/2023     8:00 PM 12/27/2023     4:30 PM 12/27/2023    12:00 PM 12/27/2023     8:04 AM   TXP LVAD INTERROGATIONS   Type HeartMate3 HeartMate3 HeartMate3 HeartMate3 HeartMate3 HeartMate3 HeartMate3   Flow 3.5 3.6 3.5 3.6 3.6 4 3.7   Speed 4900 4900 4900 4900 4900 4900 4900   PI 6.4 5.1 6.2 6.2 6.3 3.4 5.1   Power ([place]) 3.2 3.3 3.3 3.3 3.3 3.3 3.3   LSL 4500 4500 4500 4500 4500 4500 4500   Pulsatility  Pulse Pulse Pulse Intermittent pulse Intermittent pulse Intermittent pulse

## 2023-12-28 NOTE — PROGRESS NOTES
12/28/2023  Deni Frye    Current provider:  Brayan Ocampo MD    Device interrogation:      12/28/2023     7:45 AM 12/28/2023     4:00 AM 12/28/2023    12:00 AM 12/27/2023     8:00 PM 12/27/2023     4:30 PM 12/27/2023    12:00 PM 12/27/2023     8:04 AM   TXP LVAD INTERROGATIONS   Type HeartMate3 HeartMate3 HeartMate3 HeartMate3 HeartMate3 HeartMate3 HeartMate3   Flow 3.5 3.6 3.5 3.6 3.6 4 3.7   Speed 4900 4900 4900 4900 4900 4900 4900   PI 6.4 5.1 6.2 6.2 6.3 3.4 5.1   Power ([place]) 3.2 3.3 3.3 3.3 3.3 3.3 3.3   LSL 4500 4500 4500 4500 4500 4500 4500   Pulsatility  Pulse Pulse Pulse Intermittent pulse Intermittent pulse Intermittent pulse          Rounded on [facility] to ensure all mechanical assist device settings (IABP or VAD) were appropriate and all parameters were within limits.  I was able to ensure all back up equipment was present, the staff had no issues, and the Perfusion Department daily rounding was complete.      For implantable VADs: Interrogation of Ventricular assist device was performed with analysis of device parameters and review of device function. I have personally reviewed the interrogation findings and agree with findings as stated.     In emergency, the nursing units have been notified to contact the perfusion department either by:  Calling [phone] from 630am to 4pm Mon thru Fri, utilizing the On-Call Finder functionality of Epic and searching for Perfusion, or by contacting the hospital  from 4pm to 630am and on weekends and asking to speak with the perfusionist on call.    9:03 AM

## 2023-12-28 NOTE — PROGRESS NOTES
Bedside  HD completed, blood returned  and catheter ports flushed , heparin instilled into each catheter port as indicate. Ports capped and secured. B/P 101/76, pulse 94, o2 sat 98% on room air. Net uf removed 2 liters , tolerated well. Post B/P 101/76, pulse 94. Patient awake and alert. Voice no complaints.

## 2023-12-28 NOTE — PT/OT/SLP PROGRESS
Occupational Therapy   Treatment    Name: Radha Abbott  MRN: [id number]  Admitting Diagnosis:  LVAD (left ventricular assist device) present  24 Days Post-Op    Recommendations:     Discharge Recommendations: High Intensity Therapy  Discharge Equipment Recommendations:   (still assessing DME needs)  Barriers to discharge:  None    Assessment:     Radha Abbott is a 63 y.o. male with a medical diagnosis of LVAD (left ventricular assist device) present.  He presents with decreased dynamic sitting balance today, requiring increased assistance. He is progressing well with LVAD wall power>battey switch today demonstrating increased understanding with device. Performance deficits affecting function are weakness, impaired endurance, impaired self care skills, impaired functional mobility, gait instability, impaired balance, decreased safety awareness, impaired skin. Patient has demonstrated sufficient progression to warrant high intensity therapy evidenced by objectives noted below.     Rehab Prognosis:  Good; patient would benefit from acute skilled OT services to address these deficits and reach maximum level of function.       Plan:     Patient to be seen 6 x/week to address the above listed problems via self-care/home management, therapeutic activities, therapeutic exercises, neuromuscular re-education  Plan of Care Expires: 01/02/24  Plan of Care Reviewed with: patient    Subjective     Chief Complaint: unsteadiness with EOB activity  Patient/Family Comments/goals: weakness  Pain/Comfort:  Pain Rating 1: 0/10  Pain Rating Post-Intervention 1: 0/10    Objective:     Communicated with: nursing prior to session.  Patient found HOB elevated with telemetry, NG tube, PICC line, LVAD upon OT entry to room.    General Precautions: Standard, fall, LVAD, sternal    Orthopedic Precautions:N/A  Braces: N/A  Respiratory Status: Room air     Occupational Performance:     Bed Mobility:  cueing for adherence to sternal precautions  "  Patient completed Supine to Sit with moderate assistance   Patient sat EOB for ~ 25 minutes with Moderate Assistance throughout although short bouts of stand by assistance     Activities of Daily Living:  Upper Body Dressing: moderate assistance to don LVAD vest       AMPAC 6 Click ADL: 12    Treatment & Education:  Patient able to recall 3/3 sternal precautions (no pushing or pulling with UE s or picking up items >5lbs) at initiation of OT session. Patient adhered to all precautions with minimal  cueing throughout session.    Pt seen for education with LVAD management to promote independence with switching from wall power > battery power. Steps and level of support described as follows:   Verbal identification of parts: 2/3; requiring cueing for "driveline"  Completing self test: able to identify button; reporting previous completion  Writing self-test values into binder: previously completed  Rotating/switching batteries out : still requiring assistance  Check battery charge: independent  Place batteries in clips: independent, although requiring increased time  Disconnecting and reconnect power cables in correct order (white then black): requiring assistance with twisting to disconnect x2, lining up properly x1 and choosing correct cable to connect to battery x1   Ensuring lines untangled: OT assisting  Controller in front : OT assisting      Patient educated on:   -purpose of OT and OT POC  -facilitation and education on proper body mechanics, energy conservation, and safety  -importance of early mobility and out of bed activities with staff assist  -overall benefits of therapy     All questions answered within OT scope and to patient's satisfaction    Patient left sitting edge of bed with all lines intact, call button in reach, and PT present    GOALS:   Multidisciplinary Problems       Occupational Therapy Goals          Problem: Occupational Therapy    Goal Priority Disciplines Outcome Interventions "   Occupational Therapy Goal     OT, PT/OT Ongoing, Progressing    Description: Goals to be met by: 1/2/24     Patient will increase functional independence with ADLs by performing:    UB Dressing with SBA  LE Dressing with Supervision.  Grooming while standing at sink with Supervision.  Toileting from bedside commode with SBA for hygiene and clothing management.   Step transfer to bedside commode with SBA  [place] with VAD management during ADLs                             Time Tracking:     OT Date of Treatment: 12/28/23  OT Start Time: 0809  OT Stop Time: 0843  OT Total Time (min): 34 min    Billable Minutes:Therapeutic Activity 34    OT/PRIETO: OT     Number of PRIETO visits since last OT visit: 1 12/28/2023

## 2023-12-28 NOTE — PLAN OF CARE
Problem: Adult Inpatient Plan of Care  Goal: Patient-Specific Goal (Individualized)  Outcome: Ongoing, Progressing  Flowsheets (Taken 12/28/2023 1202)  Anxieties, Fears or Concerns: walking/sitting in chair  Individualized Care Needs: PT/OT     Problem: Diabetes Comorbidity  Goal: Blood Glucose Level Within Targeted Range  Outcome: Ongoing, Progressing  Intervention: Monitor and Manage Glycemia  Flowsheets (Taken 12/28/2023 1202)  Glycemic Management:   blood glucose monitored   supplemental insulin given     Problem: Cardiac Output Decreased (Heart Failure)  Goal: Optimal Cardiac Output  Outcome: Ongoing, Progressing  Intervention: Optimize Cardiac Output  Flowsheets (Taken 12/28/2023 1202)  Environmental Support:   calm environment promoted   environmental consistency promoted   rest periods encouraged       Plan of care discussed with patient.  Patient ambulating with PT/OT, fall precautions in place. Pt sat in chair x2 hours. Continuing to encourage sternal precautions, IS, and ambulation. LVAD DP and numbers WNL, smooth LVAD hum. PRN medication administered for neck pain. Discussed medications and care. Encouraged workbook, reviewed education, filled in binder. Patient has no questions at this time.

## 2023-12-28 NOTE — PROGRESS NOTES
[facility] - Cardiology Stepdown  Heart Transplant  Progress Note    Patient Name: Radha Abbott  MRN: [id number]  Admission Date: 11/9/2023  Hospital Length of Stay: 49 days  Attending Physician: Brayan Ocampo MD  Primary Care Provider: Vasu Kong MD  Principal Problem:LVAD (left ventricular assist device) present    Subjective:   Interval History: No complaints or overnight events. Yesterday afternoon pulsatile BP was elevated (MAP in 90s) so midodrine weaned to 5 mg TID. Due to get HD today. CVP 11 this AM. To remain NPO w/ TF via NGT until repeat MBSS next week.     Continuous Infusions:   sodium chloride 0.9%      dextrose 10 % in water (D10W)       Scheduled Meds:   sodium chloride 0.9%   Intravenous Once    acetaminophen  1,000 mg Per NG tube TID    amiodarone  400 mg Per NG tube Daily    balsam peru-castor oiL   Topical (Top) BID    famotidine  20 mg Per G Tube Daily    furosemide (LASIX) injection  80 mg Intravenous BID    gabapentin  125 mg Per NG tube Q12H    insulin aspart U-100  4 Units Subcutaneous 6 times per day    midodrine  5 mg Per NG tube TID    mirtazapine  15 mg Per NG tube Nightly    polyethylene glycol  17 g Per NG tube Daily    psyllium husk (aspartame)  1 packet Per NG tube Daily    senna-docusate 8.6-50 mg  2 tablet Per NG tube Daily    venlafaxine  37.5 mg Per G Tube Daily    warfarin  2.5 mg Per G Tube Every Tues, Thurs, Sat, Sun    warfarin  5 mg Per G Tube Every Mon, Wed, Fri     PRN Meds:bisacodyL, dextrose 10 % in water (D10W), dextrose 10%, dextrose 10%, glucagon (human recombinant), hydrALAZINE, insulin aspart U-100, ondansetron, Flushing PICC/Midline Protocol **AND** [DISCONTINUED] sodium chloride 0.9% **AND** sodium chloride 0.9%, traMADoL, zolpidem    Review of patient's allergies indicates:  No Known Allergies  Objective:     Vital Signs (Most Recent):  Temp: 97.4 °F (36.3 °C) (12/28/23 1122)  Pulse: 89 (12/28/23 1122)  Resp: 18 (12/28/23 1122)  BP: 104/74 (12/28/23  1122)  SpO2: (!) 94 % (12/28/23 1122) Vital Signs (24h Range):  Temp:  [97 °F (36.1 °C)-98 °F (36.7 °C)] 97.4 °F (36.3 °C)  Pulse:  [85-98] 89  Resp:  [17-20] 18  SpO2:  [94 %-100 %] 94 %  BP: ()/(0-77) 104/74     No data found.  Body mass index is 24.55 kg/m².      Intake/Output Summary (Last 24 hours) at 12/28/2023 1151  Last data filed at 12/28/2023 0800  Gross per 24 hour   Intake 150 ml   Output --   Net 150 ml         Hemodynamic Parameters:  CVP:  [11 mmHg] 11 mmHg    Telemetry: SR       Physical Exam  Constitutional:       Comments: Cachectic, temporal wasting   HENT:      Head: Normocephalic and atraumatic.   Eyes:      Conjunctiva/sclera: Conjunctivae normal.      Pupils: Pupils are equal, round, and reactive to light.   Neck:      Comments: Do not appreciate elevated JVP  Cardiovascular:      Rate and Rhythm: Normal rate and regular rhythm.      Comments: Smooth VAD hum  Pulmonary:      Effort: Pulmonary effort is normal.      Breath sounds: Normal breath sounds.   Abdominal:      General: Bowel sounds are normal.      Palpations: Abdomen is soft.   Musculoskeletal:         General: No swelling. Normal range of motion.      Cervical back: Normal range of motion and neck supple.   Skin:     General: Skin is warm and dry.      Capillary Refill: Capillary refill takes 2 to 3 seconds.   Neurological:      General: No focal deficit present.      Mental Status: He is alert and oriented to person, place, and time.            Significant Labs:  CBC:  Recent Labs   Lab 12/26/23  0449 12/26/23  0456 12/27/23  0451 12/28/23  0507   WBC 11.18  --  10.33 10.11   RBC 2.54*  --  2.40* 2.42*   HGB 7.3*  --  6.9* 7.0*   HCT 24.2* 24* 22.1* 21.6*   *  --  471* 438   MCV 95  --  92 89   MCH 28.7  --  28.8 28.9   MCHC 30.2*  --  31.2* 32.4       BNP:  Recent Labs   Lab 12/22/23  0410 12/25/23  0503 12/27/23  0450   BNP 1,628* 2,604* 1,779*       CMP:  Recent Labs   Lab 12/26/23  0449 12/27/23  0450  12/28/23  0507   * 150* 113*   CALCIUM 9.8 8.2* 8.7   ALBUMIN 1.9* 1.8* 1.9*   PROT 6.8 6.4 6.7   * 137 137   K 5.1 3.9 3.9   CO2 19* 25 23    102 100   BUN 85* 49* 69*   CREATININE 5.4* 3.6* 4.9*   ALKPHOS 199* 174* 179*   ALT 10 10 11   AST 44* 38 37   BILITOT 0.5 0.4 0.4        Coagulation:   Recent Labs   Lab 12/26/23  0449 12/27/23  0450 12/28/23  0507   INR 3.4* 3.1* 2.8*       LDH:  Recent Labs   Lab 12/26/23  0449 12/27/23  0450 12/28/23  0507   * 376* 382*       Microbiology:  Microbiology Results (last 7 days)       ** No results found for the last 168 hours. **            I have reviewed all pertinent labs within the past 24 hours.    Estimated Creatinine Clearance: 17.4 mL/min (A) (based on SCr of 4.9 mg/dL (H)).    Diagnostic Results:  I have reviewed and interpreted all pertinent imaging results/findings within the past 24 hours.  Assessment and Plan:     63 year old male with hx of CAD s/p 3v CABG (unclear anatomy, 2009), ICM with a recent EF of 15-20% s/p ICD (medtronik 2009), DM2 (a1c 7.7), HTN, HLD, Vfib on amio who presents to the ED with CC of SOB     Pt was recently admitted to [facility] [place] as a transfer.  He was started on a Lasix gtt and did well.  Started on gDMT and discharged home on  5 with plans to follow up in HTS clinic for ongoing transplant evaluation at another facility.  He says that about a few days ago he started to notice LE swelling.  This turned into Nelson and orthopnea.  He says he can't walk to the bathroom without getting SOB.  He also complains of weight gain.  He takes torsemide 20mg BID at home and was told to trial additional Lasix which he did without any improvement.  He was rx metolazone but has not been filled.  He came to the ED     In the ED he was AF with stable VS on RA.  CBC showing chronic anemia.  CMP notable for acute on chronic CKD with baseline around 2.1 and Cr now 2.6.  BNP elevated.  Lactate neg.  CXR showing pulmonary edema.  I  evaluated the pt at the bedside.  Bedside TTE showing CVP >15.  He was subsequently admitted to the CCU for diuresis.     He was continued on his home  and was started on a lasix gtt, which he responded well to overnight (net -1700cc. He feels much better this morning as well. Our transplant coordinators have been working on insurance approval and he is now being transferred to [facility] service for transplant evaluation.     * LVAD (left ventricular assist device) present  -HeartMate 3 Implanted  12/1/2023  as DT  -HTS Primary  -Implanted by Dr. Washington  -Continue Coumadin, dosing by PharmD.  Goal INR 2.0-3.0 . Therapeutic today. Discontinue Heparin bridge   -Antiplatelets Not on ASA  -LDH is stable overall today. Will continue to monitor daily.  -Speed set at  4900   rpm, LSL 4500 rpm   -Interrogation notable for no events  -ECHO 12/11 AV does not open, IVS midline, LVEDD 6.1, with HM3 speed set to 4900 rpm  -Not listed for OHTx, declined for OHTX due to comorbidities         Procedure: Device Interrogation Including analysis of device parameters  Current Settings: Ventricular Assist Device  Review of device function is stable/unstable stable        12/28/2023     7:45 AM 12/28/2023     4:00 AM 12/28/2023    12:00 AM 12/27/2023     8:00 PM 12/27/2023     4:30 PM 12/27/2023    12:00 PM 12/27/2023     8:04 AM   TXP LVAD INTERROGATIONS   Type HeartMate3 HeartMate3 HeartMate3 HeartMate3 HeartMate3 HeartMate3 HeartMate3   Flow 3.5 3.6 3.5 3.6 3.6 4 3.7   Speed 4900 4900 4900 4900 4900 4900 4900   PI 6.4 5.1 6.2 6.2 6.3 3.4 5.1   Power ([place]) 3.2 3.3 3.3 3.3 3.3 3.3 3.3   LSL 4500 4500 4500 4500 4500 4500 4500   Pulsatility  Pulse Pulse Pulse Intermittent pulse Intermittent pulse Intermittent pulse         Right foot pain  -Neuropathic pain to plantar portion of right foot  -Continue gabapentin 125 mg BID   -Added venlafaxine     Cachexia  -Daily caloric intake with tube feeds increased to 2160 calories, 98 g  protein    Unilateral complete vocal fold paralysis  -Appreciate ENT's help  -S/P augmentation of paralyzed left vocal cord 12/18    Dysphonia  -Speech following closely  -Appreciate ENT's help. S/P augmentation of paralyzed left vocal cord 12/18    Moderate malnutrition  -RD and SLP following  -Tolerating tube feeds. Total daily caloric intake increased to 2160  -Failed MBBS 12/20, continue TF    Leukocytosis  -WBC peaked at 33k, trending down at 15k. Afebrile  -Concern for septic shock. Was also on hydrocortisone with increasing pressor requirements, now discontinued   -ID consulted, started on empiric micafungin, meropenem, and vanc, since completed  -Cultures were all -ve  -Completed course for possible pneumonia, ID signed off     Adjustment disorder with anxiety  -Continue Remeron 15 mg q HS    Depressed mood  -Psychiatry consulted for depressed mood, SI when left alone  -Recommend sitter when alone (possibly with transition to stepdown).  -Continue Mirtazapine  -Psychology also following  -Added venlafaxine 12/20         IVÁN (acute kidney injury)  -Nephrology following      Altered mental status  -multifactorial  -likely metabolic encephalopathy +/- icu delirium   -CTH 12/7 negative for acute process  -Continue RRT for metabolic clearance as needed  -EEG done over the weekend no seizures detected, just generalized slowing  -provide frequent re-orientation  -Promote sleep/wake cycle   -Neurology consulted   - Much more awake, continue to encourage and motivate    Acute on chronic combined systolic and diastolic CHF, NYHA class 4  Pt with known ICM with an EF of 25% on home  presented with decompensated HF.      -lRHC 11/14 RA 14, PA 70/35, PCWP 32, CO 4.1, CI 2.1  -Repeat RHC 11/20 RA 20, PA 60/29 (39), PCWP 29, CO 4.6, CI 2.3  -ECHO 11/21 showed EF 15-20%, mild RV dysfunction, mild MR, LVED 6.9   -Home  5 has been weaned off  -Home GDMT: Entreso 24/26 BID (on hold 2/2 IVÁN), hydralazine 25 q 8hr, all  held prior to LVAD implant  -Home diuretic: Lasix 40 mg BID   -IABP inserted 11/21 and underwent DT HM3 implant 12/1, chest closed 12/4  -LVAD speed increased to 4900 rpm on 12/7   -ECHO 12/11 AV does not open, IVS midline, LVEDD 6.1, with HM3 speed set to 4900 rpm  -CVP 11. Due to get HD today  -No significant hypotension. Remains on Midodrine, weaned to 5 mg TID yesterday, briefly hypotensive with HD earlier so keeping on midodrine for today      PAF (paroxysmal atrial fibrillation)  Known hx of pAF. In sinus rhythm on admission, but 1 run of AF RVR overnight. He spontaneously converted.  - Continue home amiodarone 400mg qd  - Continue AC      Acute renal failure with acute tubular necrosis superimposed on stage 3b chronic kidney disease  IVÁN on CKD2. Baseline Cr of 1.7-2.0.   - Hold ARNi  -Trend BMPs daily   -Nephrology following   -SLED/SCUF for volume removal began on 12/1. He is anuric  -Did not respond to diuretic challenge with 240 mg IV Lasix, 1000 mg Diuril, and 500 mg IV Diamox done 12/14  -Now on HD  -Tunneled trialysis line placed 12/22 by IR    Type 2 diabetes mellitus without complication, without long-term current use of insulin  -A1c 7.6, not insulin dependent  - Endocrine following, appreciate assistance with blood glucose management    CAD (coronary artery disease)  -S/p 3vCABG in 2009  -Lipitor on hold 2/2 mild transaminitis  -Not on ASA post VAD    Ventricular tachycardia  Hx VT s/p ICD placement (medtronic 2009)  - Continue home amio 400mg qd        [facility]  Heart Transplant  [facility] - Cardiology Stepdown

## 2023-12-28 NOTE — PT/OT/SLP PROGRESS
Speech Language Pathology      Radha Abbott  MRN: [id number]    Patient not seen today secondary to receiving dialysis upon attempt. Unable to make second attempt. Will follow-up per SLP POC.

## 2023-12-28 NOTE — CONSULTS
[facility] - Cardiology Stepdown  Wound Care    Patient Name:  Radha Abbott   MRN:  [id number]  Date: 12/28/2023  Diagnosis: LVAD (left ventricular assist device) present    History:     Past Medical History:   Diagnosis Date    CAD (coronary artery disease)     Diabetes mellitus     HFrEF (heart failure with reduced ejection fraction)     ICD (implantable cardioverter-defibrillator) in place     MI, old        Social History     Socioeconomic History    Marital status:    Tobacco Use    Smoking status: Every Day     Current packs/day: 0.50     Types: Cigarettes   Substance and Sexual Activity    Alcohol use: Yes     Comment: rarely    Drug use: No     Social Determinants of Health     Financial Resource Strain: Low Risk  (10/27/2023)    Overall Financial Resource Strain (CARDIA)     Difficulty of Paying Living Expenses: Not hard at all   Food Insecurity: No Food Insecurity (10/27/2023)    Hunger Vital Sign     Worried About Running Out of Food in the Last Year: Never true     Ran Out of Food in the Last Year: Never true   Transportation Needs: No Transportation Needs (10/27/2023)    PRAPARE - Transportation     Lack of Transportation (Medical): No     Lack of Transportation (Non-Medical): No   Physical Activity: Inactive (10/27/2023)    Exercise Vital Sign     Days of Exercise per Week: 0 days     Minutes of Exercise per Session: 0 min   Stress: No Stress Concern Present (10/27/2023)    Citizen of the [place] [employer] - Occupational Stress Questionnaire     Feeling of Stress : Not at all   Social Connections: Moderately Integrated (10/27/2023)    Social Connection and Isolation Panel [NHANES]     Frequency of Communication with Friends and Family: More than three times a week     Frequency of Social Gatherings with Friends and Family: More than three times a week     Attends [employer] Services: More than 4 times per year     Active Member of Clubs or Organizations: No     Attends Club or Organization  Meetings: Never     Marital Status:    Housing Stability: Low Risk  (10/27/2023)    Housing Stability Vital Sign     Unable to Pay for Housing in the Last Year: No     Number of Places Lived in the Last Year: 1     Unstable Housing in the Last Year: No       Precautions:     Allergies as of 11/09/2023    (No Known Allergies)       WO Assessment Details/Treatment     Attempt to see pt for reconsult re sacrum. Pt now out of ICU on CSU. Pt currently on HD- nurse asked to defer skin assmt at this time. Will return.     12/28/23 1525   WOCN Assessment   WOCN Total Time (mins) 15   Visit Date 12/28/23   Visit Time 1525   Consult Type Follow Up;New   [facility]   Intervention chart review   Teaching on-going  (pt currently on HD- nurse asked to return for assmt.)     Racquel FRAZIERN, RN, CWOCN  12/28/2023

## 2023-12-29 PROBLEM — R41.82 ALTERED MENTAL STATUS: Status: RESOLVED | Noted: 2023-12-06 | Resolved: 2023-12-29

## 2023-12-29 PROBLEM — D72.829 LEUKOCYTOSIS: Status: RESOLVED | Noted: 2023-12-13 | Resolved: 2023-12-29

## 2023-12-29 LAB
25(OH)D3+25(OH)D2 SERPL-MCNC: 23 NG/ML (ref 30–96)
ALBUMIN SERPL BCP-MCNC: 1.9 G/DL (ref 3.5–5.2)
ALP SERPL-CCNC: 166 U/L (ref 55–135)
ALT SERPL W/O P-5'-P-CCNC: 13 U/L (ref 10–44)
ANION GAP SERPL CALC-SCNC: 11 MMOL/L (ref 8–16)
AST SERPL-CCNC: 38 U/L (ref 10–40)
BASOPHILS # BLD AUTO: 0.07 K/UL (ref 0–0.2)
BASOPHILS NFR BLD: 0.8 % (ref 0–1.9)
BILIRUB DIRECT SERPL-MCNC: 0.2 MG/DL (ref 0.1–0.3)
BILIRUB SERPL-MCNC: 0.4 MG/DL (ref 0.1–1)
BNP SERPL-MCNC: 2177 PG/ML (ref 0–99)
BUN SERPL-MCNC: 38 MG/DL (ref 8–23)
CALCIUM SERPL-MCNC: 9.4 MG/DL (ref 8.7–10.5)
CHLORIDE SERPL-SCNC: 104 MMOL/L (ref 95–110)
CO2 SERPL-SCNC: 23 MMOL/L (ref 23–29)
CREAT SERPL-MCNC: 3.3 MG/DL (ref 0.5–1.4)
CRP SERPL-MCNC: 75.7 MG/L (ref 0–8.2)
DIFFERENTIAL METHOD BLD: ABNORMAL
EOSINOPHIL # BLD AUTO: 0.1 K/UL (ref 0–0.5)
EOSINOPHIL NFR BLD: 1.2 % (ref 0–8)
ERYTHROCYTE [DISTWIDTH] IN BLOOD BY AUTOMATED COUNT: 17.4 % (ref 11.5–14.5)
EST. GFR  (NO RACE VARIABLE): 20.4 ML/MIN/1.73 M^2
GLUCOSE SERPL-MCNC: 138 MG/DL (ref 70–110)
HCT VFR BLD AUTO: 22.4 % (ref 40–54)
HGB BLD-MCNC: 6.9 G/DL (ref 14–18)
IMM GRANULOCYTES # BLD AUTO: 0.05 K/UL (ref 0–0.04)
IMM GRANULOCYTES NFR BLD AUTO: 0.6 % (ref 0–0.5)
INR PPP: 2.9 (ref 0.8–1.2)
LDH SERPL L TO P-CCNC: 348 U/L (ref 110–260)
LYMPHOCYTES # BLD AUTO: 0.7 K/UL (ref 1–4.8)
LYMPHOCYTES NFR BLD: 7.7 % (ref 18–48)
MAGNESIUM SERPL-MCNC: 2 MG/DL (ref 1.6–2.6)
MCH RBC QN AUTO: 28.6 PG (ref 27–31)
MCHC RBC AUTO-ENTMCNC: 30.8 G/DL (ref 32–36)
MCV RBC AUTO: 93 FL (ref 82–98)
MONOCYTES # BLD AUTO: 0.6 K/UL (ref 0.3–1)
MONOCYTES NFR BLD: 6.3 % (ref 4–15)
NEUTROPHILS # BLD AUTO: 7.4 K/UL (ref 1.8–7.7)
NEUTROPHILS NFR BLD: 83.4 % (ref 38–73)
NRBC BLD-RTO: 0 /100 WBC
PHOSPHATE SERPL-MCNC: 2.3 MG/DL (ref 2.7–4.5)
PLATELET # BLD AUTO: 474 K/UL (ref 150–450)
PMV BLD AUTO: 9.8 FL (ref 9.2–12.9)
POCT GLUCOSE: 106 MG/DL (ref 70–110)
POCT GLUCOSE: 139 MG/DL (ref 70–110)
POCT GLUCOSE: 156 MG/DL (ref 70–110)
POCT GLUCOSE: 159 MG/DL (ref 70–110)
POCT GLUCOSE: 85 MG/DL (ref 70–110)
POCT GLUCOSE: 90 MG/DL (ref 70–110)
POTASSIUM SERPL-SCNC: 4.2 MMOL/L (ref 3.5–5.1)
PREALB SERPL-MCNC: 26 MG/DL (ref 20–43)
PROT SERPL-MCNC: 6.7 G/DL (ref 6–8.4)
PROTHROMBIN TIME: 28.7 SEC (ref 9–12.5)
RBC # BLD AUTO: 2.41 M/UL (ref 4.6–6.2)
SODIUM SERPL-SCNC: 138 MMOL/L (ref 136–145)
WBC # BLD AUTO: 8.87 K/UL (ref 3.9–12.7)

## 2023-12-29 PROCEDURE — 85610 PROTHROMBIN TIME: CPT | Performed by: INTERNAL MEDICINE

## 2023-12-29 PROCEDURE — 25000003 PHARM REV CODE 250: Performed by: STUDENT IN AN ORGANIZED HEALTH CARE EDUCATION/TRAINING PROGRAM

## 2023-12-29 PROCEDURE — 97535 SELF CARE MNGMENT TRAINING: CPT

## 2023-12-29 PROCEDURE — 83880 ASSAY OF NATRIURETIC PEPTIDE: CPT | Performed by: INTERNAL MEDICINE

## 2023-12-29 PROCEDURE — 83615 LACTATE (LD) (LDH) ENZYME: CPT | Performed by: STUDENT IN AN ORGANIZED HEALTH CARE EDUCATION/TRAINING PROGRAM

## 2023-12-29 PROCEDURE — 25000003 PHARM REV CODE 250: Performed by: PHYSICIAN ASSISTANT

## 2023-12-29 PROCEDURE — 25000003 PHARM REV CODE 250: Performed by: NURSE PRACTITIONER

## 2023-12-29 PROCEDURE — 80048 BASIC METABOLIC PNL TOTAL CA: CPT | Performed by: NURSE PRACTITIONER

## 2023-12-29 PROCEDURE — 93750 INTERROGATION VAD IN PERSON: CPT | Mod: ,,, | Performed by: INTERNAL MEDICINE

## 2023-12-29 PROCEDURE — 97530 THERAPEUTIC ACTIVITIES: CPT

## 2023-12-29 PROCEDURE — 94664 DEMO&/EVAL PT USE INHALER: CPT

## 2023-12-29 PROCEDURE — 80076 HEPATIC FUNCTION PANEL: CPT | Performed by: INTERNAL MEDICINE

## 2023-12-29 PROCEDURE — 25000003 PHARM REV CODE 250: Performed by: INTERNAL MEDICINE

## 2023-12-29 PROCEDURE — 83735 ASSAY OF MAGNESIUM: CPT | Performed by: INTERNAL MEDICINE

## 2023-12-29 PROCEDURE — 99900035 HC TECH TIME PER 15 MIN (STAT)

## 2023-12-29 PROCEDURE — 86140 C-REACTIVE PROTEIN: CPT | Performed by: INTERNAL MEDICINE

## 2023-12-29 PROCEDURE — 20600001 HC STEP DOWN PRIVATE ROOM

## 2023-12-29 PROCEDURE — 85025 COMPLETE CBC W/AUTO DIFF WBC: CPT | Performed by: INTERNAL MEDICINE

## 2023-12-29 PROCEDURE — 97116 GAIT TRAINING THERAPY: CPT

## 2023-12-29 PROCEDURE — 84134 ASSAY OF PREALBUMIN: CPT | Performed by: INTERNAL MEDICINE

## 2023-12-29 PROCEDURE — 63600175 PHARM REV CODE 636 W HCPCS: Performed by: PHYSICIAN ASSISTANT

## 2023-12-29 PROCEDURE — 82306 VITAMIN D 25 HYDROXY: CPT | Performed by: INTERNAL MEDICINE

## 2023-12-29 PROCEDURE — 84100 ASSAY OF PHOSPHORUS: CPT | Performed by: INTERNAL MEDICINE

## 2023-12-29 PROCEDURE — 51798 US URINE CAPACITY MEASURE: CPT

## 2023-12-29 PROCEDURE — 94761 N-INVAS EAR/PLS OXIMETRY MLT: CPT

## 2023-12-29 PROCEDURE — 36415 COLL VENOUS BLD VENIPUNCTURE: CPT | Performed by: INTERNAL MEDICINE

## 2023-12-29 PROCEDURE — 99232 SBSQ HOSP IP/OBS MODERATE 35: CPT | Mod: ,,, | Performed by: NURSE PRACTITIONER

## 2023-12-29 PROCEDURE — 99233 SBSQ HOSP IP/OBS HIGH 50: CPT | Mod: ,,, | Performed by: NURSE PRACTITIONER

## 2023-12-29 PROCEDURE — 94799 UNLISTED PULMONARY SVC/PX: CPT

## 2023-12-29 PROCEDURE — 27000248 HC VAD-ADDITIONAL DAY

## 2023-12-29 PROCEDURE — 92526 ORAL FUNCTION THERAPY: CPT

## 2023-12-29 PROCEDURE — 25000003 PHARM REV CODE 250

## 2023-12-29 RX ORDER — WARFARIN 2.5 MG/1
2.5 TABLET ORAL DAILY
Status: DISCONTINUED | OUTPATIENT
Start: 2023-12-29 | End: 2023-12-30

## 2023-12-29 RX ORDER — SODIUM CHLORIDE 9 MG/ML
INJECTION, SOLUTION INTRAVENOUS ONCE
Status: COMPLETED | OUTPATIENT
Start: 2023-12-30 | End: 2023-12-30

## 2023-12-29 RX ADMIN — INSULIN ASPART 4 UNITS: 100 INJECTION, SOLUTION INTRAVENOUS; SUBCUTANEOUS at 04:12

## 2023-12-29 RX ADMIN — Medication: at 08:12

## 2023-12-29 RX ADMIN — GABAPENTIN 125 MG: 250 SOLUTION ORAL at 08:12

## 2023-12-29 RX ADMIN — FUROSEMIDE 80 MG: 10 INJECTION, SOLUTION INTRAVENOUS at 08:12

## 2023-12-29 RX ADMIN — INSULIN ASPART 4 UNITS: 100 INJECTION, SOLUTION INTRAVENOUS; SUBCUTANEOUS at 11:12

## 2023-12-29 RX ADMIN — FUROSEMIDE 80 MG: 10 INJECTION, SOLUTION INTRAVENOUS at 09:12

## 2023-12-29 RX ADMIN — INSULIN ASPART 2 UNITS: 100 INJECTION, SOLUTION INTRAVENOUS; SUBCUTANEOUS at 04:12

## 2023-12-29 RX ADMIN — ZOLPIDEM TARTRATE 5 MG: 5 TABLET ORAL at 09:12

## 2023-12-29 RX ADMIN — Medication: at 09:12

## 2023-12-29 RX ADMIN — SENNOSIDES AND DOCUSATE SODIUM 2 TABLET: 8.6; 5 TABLET ORAL at 08:12

## 2023-12-29 RX ADMIN — WARFARIN SODIUM 2.5 MG: 2.5 TABLET ORAL at 04:12

## 2023-12-29 RX ADMIN — INSULIN ASPART 4 UNITS: 100 INJECTION, SOLUTION INTRAVENOUS; SUBCUTANEOUS at 08:12

## 2023-12-29 RX ADMIN — MIDODRINE HYDROCHLORIDE 5 MG: 5 TABLET ORAL at 08:12

## 2023-12-29 RX ADMIN — ACETAMINOPHEN 1000 MG: 500 TABLET ORAL at 08:12

## 2023-12-29 RX ADMIN — INSULIN ASPART 2 UNITS: 100 INJECTION, SOLUTION INTRAVENOUS; SUBCUTANEOUS at 08:12

## 2023-12-29 RX ADMIN — ACETAMINOPHEN 1000 MG: 500 TABLET ORAL at 04:12

## 2023-12-29 RX ADMIN — INSULIN ASPART 4 UNITS: 100 INJECTION, SOLUTION INTRAVENOUS; SUBCUTANEOUS at 09:12

## 2023-12-29 RX ADMIN — FAMOTIDINE 20 MG: 20 TABLET, FILM COATED ORAL at 08:12

## 2023-12-29 RX ADMIN — VENLAFAXINE 37.5 MG: 37.5 TABLET ORAL at 08:12

## 2023-12-29 RX ADMIN — AMIODARONE HYDROCHLORIDE 400 MG: 200 TABLET ORAL at 08:12

## 2023-12-29 NOTE — PT/OT/SLP PROGRESS
Occupational Therapy   Treatment    Name: Radha Abbott  MRN: [id number]  Admitting Diagnosis:  LVAD (left ventricular assist device) present  25 Days Post-Op    Recommendations:     Discharge Recommendations: High Intensity Therapy  Discharge Equipment Recommendations:  other (see comments) (continue to assess)  Barriers to discharge:  None    Assessment:     Radha Abbott is a 63 y.o. male with a medical diagnosis of LVAD (left ventricular assist device) present.  He presents with improving management of LVAD wall power>battery. Patient with moderate progressing to maximal EOB sitting balance during activity . Performance deficits affecting function are weakness, impaired endurance, impaired self care skills, impaired functional mobility, gait instability, impaired balance, decreased safety awareness, impaired skin, decreased upper extremity function, decreased lower extremity function. Patient presents with good participation and motivation to return to prior level of function with high intensity therapy.  The patient demonstrates appropriate endurance to participate in up to 3 hours or 15hrs of combined therapy post acute.      Rehab Prognosis:  Good; patient would benefit from acute skilled OT services to address these deficits and reach maximum level of function.       Plan:     Patient to be seen 6 x/week to address the above listed problems via self-care/home management, therapeutic activities, therapeutic exercises, neuromuscular re-education  Plan of Care Expires: 01/02/24  Plan of Care Reviewed with: patient, family    Subjective     Chief Complaint: frustration with C  Patient/Family Comments/goals: get better  Pain/Comfort:  Pain Rating 1: 0/10  Pain Rating Post-Intervention 1: 0/10    Objective:     Communicated with: nursing prior to session.  Patient found HOB elevated with telemetry, LVAD, NG tube, PureWick, PICC line upon OT entry to room. Patient's brother present in room during session.     General  Precautions: Standard, LVAD, fall, sternal    Orthopedic Precautions:N/A  Braces: N/A  Respiratory Status: Room air     Occupational Performance:     Bed Mobility:    Patient completed Supine to Sit with moderate assistance   Patient sat EOB for ~ 30 minutes with overall moderate-maximal assistance to maintain sitting balance  Patient exhibiting kyphotic posturing with forward head, rounded shoulders, downward head requiring consistent cueing and tactile assistance; lateral swaying also noted       Functional Mobility/Transfers:  Patient completed Bed <> Chair Transfer using Squat Pivot technique with total assistance with no assistive device    Activities of Daily Living:  Grooming: stand by assistance to complete oral hygiene via rung mouth swab while supported sitting in bedside chair      [facility] 6 Click ADL: 12    Treatment & Education:  Patient able to recall 3/3 sternal precautions (no pushing or pulling with UE s or picking up items >5lbs) at initiation of OT session. Patient adhered to all precautions with moderate cueing throughout session.    Pt seen for education with LVAD management to promote independence with switching from wall power > battery power. Steps and level of support described as follows:   Completing self test: able to identify button; requiring assistance secondary to decreased hand strength   Writing self-test values into binder: patient completed but illegible handwriting   Rotating/switching batteries out : moderate cueing  Check battery charge: independent  Place batteries in clips: independent  Disconnecting and reconnect power cables in correct order (white then black) : required moderate assistance   Ensuring lines untangled: required minimal assistance   Items into consolidation bag: required minimal assistance     Patient left up in chair with all lines intact, call button in reach, and nursing notified    GOALS:   Multidisciplinary Problems       Occupational Therapy Goals           Problem: Occupational Therapy    Goal Priority Disciplines Outcome Interventions   Occupational Therapy Goal     OT, PT/OT Ongoing, Progressing    Description: Goals to be met by: 1/2/24     Patient will increase functional independence with ADLs by performing:    UB Dressing with SBA  LE Dressing with Supervision.  Grooming while standing at sink with Supervision.  Toileting from bedside commode with SBA for hygiene and clothing management.   Step transfer to bedside commode with SBA  Maries with VAD management during ADLs                             Time Tracking:     OT Date of Treatment: 12/29/23  OT Start Time: 0913  OT Stop Time: 0955  OT Total Time (min): 42 min    Billable Minutes:Self Care/Home Management 10  Therapeutic Activity 32    OT/PRIETO: OT     Number of PRIETO visits since last OT visit: 1 12/29/2023

## 2023-12-29 NOTE — SUBJECTIVE & OBJECTIVE
Interval History: No complaints or overnight events.     Continuous Infusions:   sodium chloride 0.9%      dextrose 10 % in water (D10W)       Scheduled Meds:   [START ON 12/30/2023] sodium chloride 0.9%   Intravenous Once    acetaminophen  1,000 mg Per NG tube TID    amiodarone  400 mg Per NG tube Daily    balsam peru-castor oiL   Topical (Top) BID    famotidine  20 mg Per G Tube Daily    furosemide (LASIX) injection  80 mg Intravenous BID    gabapentin  125 mg Per NG tube Q12H    insulin aspart U-100  4 Units Subcutaneous 6 times per day    midodrine  5 mg Per NG tube TID    mirtazapine  15 mg Per NG tube Nightly    polyethylene glycol  17 g Per NG tube Daily    psyllium husk (aspartame)  1 packet Per NG tube Daily    senna-docusate 8.6-50 mg  2 tablet Per NG tube Daily    venlafaxine  37.5 mg Per G Tube Daily    warfarin  2.5 mg Per G Tube Daily     PRN Meds:bisacodyL, dextrose 10 % in water (D10W), dextrose 10%, dextrose 10%, glucagon (human recombinant), heparin (porcine), hydrALAZINE, insulin aspart U-100, ondansetron, Flushing PICC/Midline Protocol **AND** [DISCONTINUED] sodium chloride 0.9% **AND** sodium chloride 0.9%, traMADoL, zolpidem    Review of patient's allergies indicates:  No Known Allergies  Objective:     Vital Signs (Most Recent):  Temp: 98.1 °F (36.7 °C) (12/29/23 0815)  Pulse: 95 (12/29/23 0815)  Resp: 17 (12/29/23 0815)  BP: (!) 90/0 (12/29/23 0815)  SpO2: 98 % (12/29/23 0815) Vital Signs (24h Range):  Temp:  [97.4 °F (36.3 °C)-98.5 °F (36.9 °C)] 98.1 °F (36.7 °C)  Pulse:  [85-98] 95  Resp:  [16-20] 17  SpO2:  [94 %-99 %] 98 %  BP: ()/(0-80) 90/0     No data found.  Body mass index is 24.55 kg/m².      Intake/Output Summary (Last 24 hours) at 12/29/2023 0857  Last data filed at 12/29/2023 0620  Gross per 24 hour   Intake 825 ml   Output 2500 ml   Net -1675 ml         Hemodynamic Parameters:  CVP:  [8 mmHg] 8 mmHg    Telemetry: SR       Physical Exam  Constitutional:       Comments:  Cachectic, temporal wasting   HENT:      Head: Normocephalic and atraumatic.   Eyes:      Conjunctiva/sclera: Conjunctivae normal.      Pupils: Pupils are equal, round, and reactive to light.   Neck:      Comments: Do not appreciate elevated JVP  Cardiovascular:      Rate and Rhythm: Normal rate and regular rhythm.      Comments: Smooth VAD hum  Pulmonary:      Effort: Pulmonary effort is normal.      Breath sounds: Normal breath sounds.   Abdominal:      General: Bowel sounds are normal.      Palpations: Abdomen is soft.   Musculoskeletal:         General: No swelling. Normal range of motion.      Cervical back: Normal range of motion and neck supple.   Skin:     General: Skin is warm and dry.      Capillary Refill: Capillary refill takes 2 to 3 seconds.   Neurological:      General: No focal deficit present.      Mental Status: He is alert and oriented to person, place, and time.            Significant Labs:  CBC:  Recent Labs   Lab 12/27/23  0451 12/28/23  0507 12/29/23  0406   WBC 10.33 10.11 8.87   RBC 2.40* 2.42* 2.41*   HGB 6.9* 7.0* 6.9*   HCT 22.1* 21.6* 22.4*   * 438 474*   MCV 92 89 93   MCH 28.8 28.9 28.6   MCHC 31.2* 32.4 30.8*       BNP:  Recent Labs   Lab 12/25/23  0503 12/27/23  0450 12/29/23  0406   BNP 2,604* 1,779* 2,177*       CMP:  Recent Labs   Lab 12/27/23  0450 12/28/23  0507 12/29/23  0406   * 113* 138*   CALCIUM 8.2* 8.7 9.4   ALBUMIN 1.8* 1.9* 1.9*   PROT 6.4 6.7 6.7    137 138   K 3.9 3.9 4.2   CO2 25 23 23    100 104   BUN 49* 69* 38*   CREATININE 3.6* 4.9* 3.3*   ALKPHOS 174* 179* 166*   ALT 10 11 13   AST 38 37 38   BILITOT 0.4 0.4 0.4        Coagulation:   Recent Labs   Lab 12/27/23  0450 12/28/23  0507 12/29/23  0406   INR 3.1* 2.8* 2.9*       LDH:  Recent Labs   Lab 12/27/23  0450 12/28/23  0507 12/29/23  0406   * 382* 348*       Microbiology:  Microbiology Results (last 7 days)       ** No results found for the last 168 hours. **            I have  reviewed all pertinent labs within the past 24 hours.    Estimated Creatinine Clearance: 25.8 mL/min (A) (based on SCr of 3.3 mg/dL (H)).    Diagnostic Results:  I have reviewed and interpreted all pertinent imaging results/findings within the past 24 hours.

## 2023-12-29 NOTE — CARE UPDATE
-Glucose Goal 140-180    -A1C:   Hemoglobin A1C   Date Value Ref Range Status   10/12/2023 7.6 (H) 4.0 - 5.6 % Final     Comment:     ADA Screening Guidelines:  5.7-6.4%  Consistent with prediabetes  >or=6.5%  Consistent with diabetes    High levels of fetal hemoglobin interfere with the HbA1C  assay. Heterozygous hemoglobin variants (HbS, HgC, etc)do  not significantly interfere with this assay.   However, presence of multiple variants may affect accuracy.           -HOME REGIMEN: metformin     -GLUCOSE TREND FOR THE PAST 24HRS:   Recent Labs   Lab 12/28/23  0930 12/28/23  1338 12/28/23  1759 12/28/23  2142 12/29/23  0103 12/29/23  0405   POCTGLUCOSE 189* 138* 137* 162* 85 139*           -NO HYPOGYCEMIAS NOTED     - Diet  Diet NPO Except for: Medication (per NG tube)    -Steroids - none     -Tube Feeds - TF @ 45ml/hr        Plan:   - Novolog 4 units q 4 hr to cover TF- hold if TF held or BG < 100   - POCT Glucose every 4 hours  - Hypoglycemia protocol in place      ** Please notify Endocrine for any change and/or advance in diet**  ** Please call Endocrine for any BG related issues **     Discharge Planning:   TBD. Please notify endocrinology prior to discharge.         Erasmo Parrish DNP, FNP-C  Department of Endocrinology  Inpatient Glycemic Management

## 2023-12-29 NOTE — PLAN OF CARE
Pt free of falls and injury. Pt c/o severe pain and scheduled tylenol given and helped. Pt AAOx4. Fall precautions remain in place. TF going at 45 mL/hr and residual checked q4h. Pt turned q2h. BG monitored and insulin given as ordered. Lab drawn and CVP of 8 obtained. LVAD hum present and smooth. VAD numbers and dopplers WDL. DLES dressing CDI. Plan of care reviewed with pt. Will continue to monitor pt.        Problem: Adult Inpatient Plan of Care  Goal: Plan of Care Review  Outcome: Ongoing, Progressing  Goal: Patient-Specific Goal (Individualized)  Outcome: Ongoing, Progressing  Goal: Optimal Comfort and Wellbeing  Outcome: Ongoing, Progressing     Problem: Diabetes Comorbidity  Goal: Blood Glucose Level Within Targeted Range  Outcome: Ongoing, Progressing     Problem: Fluid and Electrolyte Imbalance (Acute Kidney Injury/Impairment)  Goal: Fluid and Electrolyte Balance  Outcome: Ongoing, Progressing     Problem: Infection  Goal: Absence of Infection Signs and Symptoms  Outcome: Ongoing, Progressing     Problem: Fluid Imbalance (Heart Failure)  Goal: Fluid Balance  Outcome: Ongoing, Progressing     Problem: Fall Injury Risk  Goal: Absence of Fall and Fall-Related Injury  Outcome: Ongoing, Progressing     Problem: Skin Injury Risk Increased  Goal: Skin Health and Integrity  Outcome: Ongoing, Progressing

## 2023-12-29 NOTE — PROGRESS NOTES
Update:        met with patient and brother in room. Patient denied needs at this time. Patient reported his wife returned home and his older brother is now here with him. Patients older brother denied needs at this time. Patient and caregiver agree to contact transplant team with needs,or concerns as they arise.      Bernadette Fall LMSW

## 2023-12-29 NOTE — PROGRESS NOTES
[facility] - Cardiology Stepdown  Heart Transplant  Progress Note    Patient Name: Radha Abbott  MRN: [id number]  Admission Date: 11/9/2023  Hospital Length of Stay: 50 days  Attending Physician: Brayan Ocampo MD  Primary Care Provider: Vasu Kong MD  Principal Problem:LVAD (left ventricular assist device) present    Subjective:   Interval History: No complaints or overnight events.     Continuous Infusions:   sodium chloride 0.9%      dextrose 10 % in water (D10W)       Scheduled Meds:   [START ON 12/30/2023] sodium chloride 0.9%   Intravenous Once    acetaminophen  1,000 mg Per NG tube TID    amiodarone  400 mg Per NG tube Daily    balsam peru-castor oiL   Topical (Top) BID    famotidine  20 mg Per G Tube Daily    furosemide (LASIX) injection  80 mg Intravenous BID    gabapentin  125 mg Per NG tube Q12H    insulin aspart U-100  4 Units Subcutaneous 6 times per day    midodrine  5 mg Per NG tube TID    mirtazapine  15 mg Per NG tube Nightly    polyethylene glycol  17 g Per NG tube Daily    psyllium husk (aspartame)  1 packet Per NG tube Daily    senna-docusate 8.6-50 mg  2 tablet Per NG tube Daily    venlafaxine  37.5 mg Per G Tube Daily    warfarin  2.5 mg Per G Tube Daily     PRN Meds:bisacodyL, dextrose 10 % in water (D10W), dextrose 10%, dextrose 10%, glucagon (human recombinant), heparin (porcine), hydrALAZINE, insulin aspart U-100, ondansetron, Flushing PICC/Midline Protocol **AND** [DISCONTINUED] sodium chloride 0.9% **AND** sodium chloride 0.9%, traMADoL, zolpidem    Review of patient's allergies indicates:  No Known Allergies  Objective:     Vital Signs (Most Recent):  Temp: 98.1 °F (36.7 °C) (12/29/23 0815)  Pulse: 95 (12/29/23 0815)  Resp: 17 (12/29/23 0815)  BP: (!) 90/0 (12/29/23 0815)  SpO2: 98 % (12/29/23 0815) Vital Signs (24h Range):  Temp:  [97.4 °F (36.3 °C)-98.5 °F (36.9 °C)] 98.1 °F (36.7 °C)  Pulse:  [85-98] 95  Resp:  [16-20] 17  SpO2:  [94 %-99 %] 98 %  BP: ()/(0-80) 90/0      No data found.  Body mass index is 24.55 kg/m².      Intake/Output Summary (Last 24 hours) at 12/29/2023 0857  Last data filed at 12/29/2023 0620  Gross per 24 hour   Intake 825 ml   Output 2500 ml   Net -1675 ml         Hemodynamic Parameters:  CVP:  [8 mmHg] 8 mmHg    Telemetry: SR       Physical Exam  Constitutional:       Comments: Cachectic, temporal wasting   HENT:      Head: Normocephalic and atraumatic.   Eyes:      Conjunctiva/sclera: Conjunctivae normal.      Pupils: Pupils are equal, round, and reactive to light.   Neck:      Comments: Do not appreciate elevated JVP  Cardiovascular:      Rate and Rhythm: Normal rate and regular rhythm.      Comments: Smooth VAD hum  Pulmonary:      Effort: Pulmonary effort is normal.      Breath sounds: Normal breath sounds.   Abdominal:      General: Bowel sounds are normal.      Palpations: Abdomen is soft.   Musculoskeletal:         General: No swelling. Normal range of motion.      Cervical back: Normal range of motion and neck supple.   Skin:     General: Skin is warm and dry.      Capillary Refill: Capillary refill takes 2 to 3 seconds.   Neurological:      General: No focal deficit present.      Mental Status: He is alert and oriented to person, place, and time.            Significant Labs:  CBC:  Recent Labs   Lab 12/27/23  0451 12/28/23  0507 12/29/23  0406   WBC 10.33 10.11 8.87   RBC 2.40* 2.42* 2.41*   HGB 6.9* 7.0* 6.9*   HCT 22.1* 21.6* 22.4*   * 438 474*   MCV 92 89 93   MCH 28.8 28.9 28.6   MCHC 31.2* 32.4 30.8*       BNP:  Recent Labs   Lab 12/25/23  0503 12/27/23  0450 12/29/23  0406   BNP 2,604* 1,779* 2,177*       CMP:  Recent Labs   Lab 12/27/23  0450 12/28/23  0507 12/29/23  0406   * 113* 138*   CALCIUM 8.2* 8.7 9.4   ALBUMIN 1.8* 1.9* 1.9*   PROT 6.4 6.7 6.7    137 138   K 3.9 3.9 4.2   CO2 25 23 23    100 104   BUN 49* 69* 38*   CREATININE 3.6* 4.9* 3.3*   ALKPHOS 174* 179* 166*   ALT 10 11 13   AST 38 37 38   BILITOT  0.4 0.4 0.4        Coagulation:   Recent Labs   Lab 12/27/23  0450 12/28/23  0507 12/29/23  0406   INR 3.1* 2.8* 2.9*       LDH:  Recent Labs   Lab 12/27/23  0450 12/28/23  0507 12/29/23  0406   * 382* 348*       Microbiology:  Microbiology Results (last 7 days)       ** No results found for the last 168 hours. **            I have reviewed all pertinent labs within the past 24 hours.    Estimated Creatinine Clearance: 25.8 mL/min (A) (based on SCr of 3.3 mg/dL (H)).    Diagnostic Results:  I have reviewed and interpreted all pertinent imaging results/findings within the past 24 hours.  Assessment and Plan:     63 year old male with hx of CAD s/p 3v CABG (unclear anatomy, 2009), ICM with a recent EF of 15-20% s/p ICD (medtronik 2009), DM2 (a1c 7.7), HTN, HLD, Vfib on amio who presents to the ED with CC of SOB     Pt was recently admitted to [facility] as a transfer.  He was started on a Lasix gtt and did well.  Started on gDMT and discharged home on  5 with plans to follow up in HTS clinic for ongoing transplant evaluation at another facility.  He says that about a few days ago he started to notice LE swelling.  This turned into Nelson and orthopnea.  He says he can't walk to the bathroom without getting SOB.  He also complains of weight gain.  He takes torsemide 20mg BID at home and was told to trial additional Lasix which he did without any improvement.  He was rx metolazone but has not been filled.  He came to the ED     In the ED he was AF with stable VS on RA.  CBC showing chronic anemia.  CMP notable for acute on chronic CKD with baseline around 2.1 and Cr now 2.6.  BNP elevated.  Lactate neg.  CXR showing pulmonary edema.  I evaluated the pt at the bedside.  Bedside TTE showing CVP >15.  He was subsequently admitted to the CCU for diuresis.     He was continued on his home  and was started on a lasix gtt, which he responded well to overnight (net -1700cc. He feels much better this morning as well. Our  transplant coordinators have been working on insurance approval and he is now being transferred to [facility] service for transplant evaluation.     * LVAD (left ventricular assist device) present  -HeartMate 3 Implanted  12/1/2023  as DT  -HTS Primary  -Implanted by Dr. Washington  -Continue Coumadin, dosing by PharmD.  Goal INR 2.0-3.0 . Therapeutic today. Discontinue Heparin bridge   -Antiplatelets Not on ASA  -LDH is stable overall today. Will continue to monitor daily.  -Speed set at  4900   rpm, LSL 4500 rpm   -Interrogation notable for no events  -ECHO 12/11 AV does not open, IVS midline, LVEDD 6.1, with HM3 speed set to 4900 rpm  -Not listed for OHTx, declined for OHTX due to comorbidities         Procedure: Device Interrogation Including analysis of device parameters  Current Settings: Ventricular Assist Device  Review of device function is stable/unstable stable        12/28/2023     7:45 AM 12/28/2023     4:00 AM 12/28/2023    12:00 AM 12/27/2023     8:00 PM 12/27/2023     4:30 PM 12/27/2023    12:00 PM 12/27/2023     8:04 AM   TXP LVAD INTERROGATIONS   Type HeartMate3 HeartMate3 HeartMate3 HeartMate3 HeartMate3 HeartMate3 HeartMate3   Flow 3.5 3.6 3.5 3.6 3.6 4 3.7   Speed 4900 4900 4900 4900 4900 4900 4900   PI 6.4 5.1 6.2 6.2 6.3 3.4 5.1   Power (Carrington) 3.2 3.3 3.3 3.3 3.3 3.3 3.3   LSL 4500 4500 4500 4500 4500 4500 4500   Pulsatility  Pulse Pulse Pulse Intermittent pulse Intermittent pulse Intermittent pulse         Cachexia  -Daily caloric intake with tube feeds increased to 2160 calories, 98 g protein    Unilateral complete vocal fold paralysis  -Appreciate ENT's help  -S/P augmentation of paralyzed left vocal cord 12/18    Dysphonia  -Speech following closely  -Appreciate ENT's help. S/P augmentation of paralyzed left vocal cord 12/18    Moderate malnutrition  -RD and SLP following  -Tolerating tube feeds. Total daily caloric intake increased to 2160  -Failed MBBS 12/20, continue TF    Adjustment disorder  with anxiety  -Continue Remeron 15 mg q HS    Depressed mood  -Psychiatry consulted for depressed mood, SI when left alone  -Recommend sitter when alone (possibly with transition to stepdown).  -Continue Mirtazapine  -Psychology also following  -Added venlafaxine 12/20         IVÁN (acute kidney injury)  -Nephrology following      Acute on chronic combined systolic and diastolic CHF, NYHA class 4  Pt with known ICM with an EF of 25% on home  presented with decompensated HF.      -RHC 11/14 RA 14, PA 70/35, PCWP 32, CO 4.1, CI 2.1  -Repeat RHC 11/20 RA 20, PA 60/29 (39), PCWP 29, CO 4.6, CI 2.3  -ECHO 11/21 showed EF 15-20%, mild RV dysfunction, mild MR, LVED 6.9   -Home  5 has been weaned off  -Home GDMT: Entreso 24/26 BID (on hold 2/2 IVÁN), hydralazine 25 q 8hr, all held prior to LVAD implant  -Home diuretic: Lasix 40 mg BID   -IABP inserted 11/21 and underwent DT HM3 implant 12/1, chest closed 12/4  -ECHO 12/11 AV does not open, IVS midline, LVEDD 6.1, with HM3 speed set to 4900 rpm  -HD Tu, Thurs, Sat.   -No significant hypotension. Will wean midodrine off.       PAF (paroxysmal atrial fibrillation)  Known hx of pAF. In sinus rhythm on admission, but 1 run of AF RVR overnight. He spontaneously converted.  - Continue home amiodarone 400mg qd  - Continue AC      Acute renal failure with acute tubular necrosis superimposed on stage 3b chronic kidney disease  IVÁN on CKD2. Baseline Cr of 1.7-2.0.   - Hold ARNi  -Trend BMPs daily   -Nephrology following   -SLED/SCUF for volume removal began on 12/1. He is anuric  -Did not respond to diuretic challenge with 240 mg IV Lasix, 1000 mg Diuril, and 500 mg IV Diamox done 12/14  -Now on HD  -Tunneled trialysis line placed 12/22 by IR    Type 2 diabetes mellitus without complication, without long-term current use of insulin  -A1c 7.6, not insulin dependent  - Endocrine following, appreciate assistance with blood glucose management    CAD (coronary artery disease)  -S/p  3vCABG in 2009  -Lipitor on hold 2/2 mild transaminitis  -Not on ASA post VAD    Ventricular tachycardia  Hx VT s/p ICD placement (medtronic 2009)  - Continue home amio 400mg qd      Erasto Park NP  Heart Transplant  [facility] - Cardiology Stepdown

## 2023-12-29 NOTE — PT/OT/SLP PROGRESS
Speech Language Pathology Treatment    Patient Name:  Radha Abbott   MRN:  [id number]  Admitting Diagnosis: LVAD (left ventricular assist device) present    Recommendations:                 General Recommendations:  Dysphagia therapy  Diet recommendations:  NPO, Liquid Diet Level: NPO, ice chips only when completing dysphagia exercises, Pt is a silent aspirator.   Aspiration Precautions: Strict aspiration precautions   General Precautions: Standard, LVAD  Communication strategies:  none    Assessment:     Radha Abbott is a 63 y.o. male with an SLP diagnosis of Dysphagia.  Subjective     Pt seen with brother at the bedside.   Patient goals: none stated     Pain/Comfort:  Pain Rating 1: 0/10    Respiratory Status: Room air    Objective:     Has the patient been evaluated by SLP for swallowing?   Yes  Keep patient NPO? Yes   Current Respiratory Status:   Room air      Pt seen for ongoing dysphagia tx. Pt was seen with his brother at the bedside this date. Pt completing dysphagia exercises x5 each with with ice chips and mod cues. Extensive education provided on ongoing SLP POC including repeating MBSS week of 1/2/24, anatomy of the swallow, aspiration related complications, silent aspiration. Brother and pt at the bedside verbalizing understanding. Recommend ongoing NPO status with strict aspiration precautions ( ice chips only when completing exercises). Plans for repeat MBSS week of 1/2/24 to provide optimal time for dysphagia exercises to improve swallow function. SLP will follow.      Goals:   Multidisciplinary Problems       SLP Goals          Problem: SLP    Goal Priority Disciplines Outcome   SLP Goal     SLP Ongoing, Progressing   Description: Speech Language Pathology Goals  Goals expected to be met by 12/24    1. Pt will participate in ongoing assessment of swallow function to help determine least restrictive diet                            Plan:     Patient to be seen:  4 x/week   Plan of Care reviewed with:   patient   SLP Follow-Up:  Yes       Discharge recommendations:  High Intensity Therapy   Barriers to Discharge:  None    Time Tracking:     SLP Treatment Date:   12/29/23  Speech Start Time:  1400  Speech Stop Time:  1425     Speech Total Time (min):  25 min    Billable Minutes: Treatment Swallowing Dysfunction 10 and Self Care/Home Management Training 15    12/29/2023

## 2023-12-29 NOTE — PROGRESS NOTES
Patient was seen today in the hospital. Patient asleep prior to room entry. Patient's brother at bedside. Patient refused VAD equipment education at this time.

## 2023-12-29 NOTE — PROCEDURES
Interrogation of Ventricular assist device was performed with physician analysis of device parameters and review of device function. I have personally reviewed the interrogation findings and agree with findings as stated.   Procedure: Device Interrogation Including analysis of device parameters  Current Settings: Ventricular Assist Device  Review of device function is stable      12/29/2023     8:15 AM 12/29/2023     3:55 AM 12/29/2023    12:09 AM 12/28/2023     8:16 PM 12/28/2023     4:00 PM 12/28/2023    12:00 PM 12/28/2023     7:45 AM   TXP LVAD INTERROGATIONS   Type HeartMate3 HeartMate3 HeartMate3 HeartMate3 HeartMate3 HeartMate3 HeartMate3   Flow 3.6 3.5 3.7 3.6 3.7 3.6 3.5   Speed 4900 4900 4900 4900 4900 4900 4900   PI 5.5 6.2 6 6.1 5.8 5.6 6.4   Power (Carrington) 3.3 3.3 3.3 3.3 3.3 3.3 3.2   LSL 4500 4500 4500 4500   4500   Pulsatility  Pulse Pulse Pulse         Brayan Ocampo MD

## 2023-12-29 NOTE — PROGRESS NOTES
HD completed on yesterday with 2 L removed. Electrolytes non emergent.   CVP 8 this AM.   Plans for HD again on Saturday, 12/30.    XIAO Hinton DNP, APRN, FNP-C  Department of Nephrology  [facility]  Pager: [phone]

## 2023-12-29 NOTE — PROGRESS NOTES
12/29/2023  Alondra Dykes    Current provider:  Brayan Ocampo MD    Device interrogation:      12/29/2023     3:55 AM 12/29/2023    12:09 AM 12/28/2023     8:16 PM 12/28/2023     4:00 PM 12/28/2023    12:00 PM 12/28/2023     7:45 AM 12/28/2023     4:00 AM   TXP LVAD INTERROGATIONS   Type HeartMate3 HeartMate3 HeartMate3 HeartMate3 HeartMate3 HeartMate3 HeartMate3   Flow 3.5 3.7 3.6 3.7 3.6 3.5 3.6   Speed 4900 4900 4900 4900 4900 4900 4900   PI 6.2 6 6.1 5.8 5.6 6.4 5.1   Power (Carrington) 3.3 3.3 3.3 3.3 3.3 3.2 3.3   LSL 4500 4500 4500   4500 4500   Pulsatility Pulse Pulse Pulse    Pulse          Rounded on [facility] to ensure all mechanical assist device settings (IABP or VAD) were appropriate and all parameters were within limits.  I was able to ensure all back up equipment was present, the staff had no issues, and the Perfusion Department daily rounding was complete.      For implantable VADs: Interrogation of Ventricular assist device was performed with analysis of device parameters and review of device function. I have personally reviewed the interrogation findings and agree with findings as stated.     In emergency, the nursing units have been notified to contact the perfusion department either by:  Calling [phone] from 630am to 4pm Mon thru Fri, utilizing the On-Call Finder functionality of Epic and searching for Perfusion, or by contacting the hospital  from 4pm to 630am and on weekends and asking to speak with the perfusionist on call.    7:06 AM

## 2023-12-29 NOTE — ASSESSMENT & PLAN NOTE
Pt with known ICM with an EF of 25% on home  presented with decompensated HF.      -RHC 11/14 RA 14, PA 70/35, PCWP 32, CO 4.1, CI 2.1  -Repeat RHC 11/20 RA 20, PA 60/29 (39), PCWP 29, CO 4.6, CI 2.3  -ECHO 11/21 showed EF 15-20%, mild RV dysfunction, mild MR, LVED 6.9   -Home  5 has been weaned off  -Home GDMT: Entreso 24/26 BID (on hold 2/2 IVÁN), hydralazine 25 q 8hr, all held prior to LVAD implant  -Home diuretic: Lasix 40 mg BID   -IABP inserted 11/21 and underwent DT HM3 implant 12/1, chest closed 12/4  -ECHO 12/11 AV does not open, IVS midline, LVEDD 6.1, with HM3 speed set to 4900 rpm  -HD Tu, Thurs, Sat.   -No significant hypotension. Will wean midodrine off.

## 2023-12-29 NOTE — PT/OT/SLP PROGRESS
Physical Therapy Treatment    Patient Name:  Radha Abbott   MRN:  [id number]    Recommendations:     Discharge Recommendations: High Intensity Therapy  Discharge Equipment Recommendations:  (will determine DME needs closer to discharge)  Barriers to discharge: Decreased caregiver support    Assessment:     Radha Abbott is a 63 y.o. male admitted with a medical diagnosis of LVAD (left ventricular assist device) present.  He presents with the following impairments/functional limitations: weakness, gait instability, impaired endurance, impaired balance, decreased lower extremity function, impaired functional mobility, decreased coordination, decreased safety awareness pt tolerated treatment better being able to gait train farther, but cont to function at a low functional level. Pt will benefit from cont skilled PT 6x/wk to progress physically.  Pt is significantly below previous functional level, increased risk of falls and increased burden of care currently.  Patient has demonstrated sufficient progression to warrant high intensity therapy evidenced by objectives noted below. Pt is s/p LVAD placement 12/1 with sternal closure 12/4.    Rehab Prognosis: Good; patient would benefit from acute skilled PT services to address these deficits and reach maximum level of function.    Recent Surgery: Procedure(s) (LRB):  CLOSURE, WOUND, STERNUM (N/A)  INSERTION, GRAFT, PERICARDIUM  DRAINAGE, PLEURAL EFFUSION 25 Days Post-Op    Plan:     During this hospitalization, patient to be seen 6 x/week to address the identified rehab impairments via gait training, therapeutic activities and progress toward the following goals:    Plan of Care Expires:  01/04/24    Subjective     Chief Complaint: pt c/o feeling tired and weak.   Patient/Family Comments/goals:  to get stronger and go home.   Pain/Comfort:  Pain Rating 1: 0/10  Pain Rating Post-Intervention 1: 0/10      Objective:     Communicated with nurse  prior to session.  Patient found up  in chair with NG tube, LVAD, telemetry, PureWick, PICC line upon PT entry to room.     General Precautions: Standard, fall, LVAD, sternal  Orthopedic Precautions: N/A  Braces: N/A  Respiratory Status: Room air     Functional Mobility:  Bed Mobility:  pt needed verbal cues for functional mobility with sternal precautions.    Rolling Right: moderate assistance  Sit to Supine: moderate assistance    Transfers:     Sit to Stand:  maximal assistance and of 2 persons with B platform RW.  Pt stood x 2 reps. Pt was max assist to weight shift center of gravity over base of support to achieve upright posture. Pt had B hips flexed with initial standing.   Bed to Chair: moderate assistance and of 2 persons with  hand-held assist  using  Squat Pivot    Pt sat in chair 1 hour 15 min.     Gait: pt received gait training ~ 20 ft then 40 ft with B platform RW. Pt was moderate assist for balance with gait training with tactile pressure at hips to achieve hip extension. . Pt needed verbal cues for upright posture, holding head upright and increased base of support with gait training. Pt had B shoulder slumped and head hanging forward with gait training. Pt had sitting rest period between distances gait trained. Pt was additional assist of 1 to follow with chair and had emergency bag present during treatment.       AM-PAC 6 CLICK MOBILITY  Turning over in bed (including adjusting bedclothes, sheets and blankets)?: 2  Sitting down on and standing up from a chair with arms (e.g., wheelchair, bedside commode, etc.): 2  Moving from lying on back to sitting on the side of the bed?: 2  Moving to and from a bed to a chair (including a wheelchair)?: 2  Need to walk in hospital room?: 2  Climbing 3-5 steps with a railing?: 1  Basic Mobility Total Score: 11       Treatment & Education:  Pt and pt brother received verbal instructions in PT POC and both verbally expressed understanding of such.     Patient left supine with all lines intact, call  button in reach, and brother and RN  present..    GOALS:   Multidisciplinary Problems       Physical Therapy Goals          Problem: Physical Therapy    Goal Priority Disciplines Outcome Goal Variances Interventions   Physical Therapy Goal     PT, PT/OT Ongoing, Progressing     Description: Goals to be met by: 23     Patient will increase functional independence with mobility by performin. Sit to stand transfer with modified independence  2. Bed to chair transfer with supervision  3. Gait  x 150 feet with supervision using physician approved AD with standing rest breaks prn.   4. Lower extremity exercise program x30 reps per handout, with independence  5. Recite 3/3 sternal precautions and remain complaint with precautions throughout session with no verbal cues                     Multidisciplinary Problems (Resolved)          Problem: Physical Therapy    Goal Priority Disciplines Outcome Goal Variances Interventions   Physical Therapy Goal   (Resolved)     PT, PT/OT Met     Description: Goals to be met by: 23     Patient will increase functional independence with mobility by performin. Evaluate pt's need for physical therapy.                          Time Tracking:     PT Received On: 23  PT Start Time: 0955     PT Stop Time: 1014  PT Total Time (min): 19 min     2nd PT start time: 11:07  2nd PT end time: 11:12  ( 19 min +5 min = 24 min total)     Billable Minutes: Gait Training 19 min  and Therapeutic Activity 5 min     Treatment Type: Treatment  PT/PTA: PT     Number of PTA visits since last PT visit: 0     2023

## 2023-12-30 PROBLEM — F43.22 ADJUSTMENT DISORDER WITH ANXIETY: Status: RESOLVED | Noted: 2023-12-12 | Resolved: 2023-12-30

## 2023-12-30 LAB
ALBUMIN SERPL BCP-MCNC: 1.9 G/DL (ref 3.5–5.2)
ALP SERPL-CCNC: 178 U/L (ref 55–135)
ALT SERPL W/O P-5'-P-CCNC: 12 U/L (ref 10–44)
ANION GAP SERPL CALC-SCNC: 10 MMOL/L (ref 8–16)
AST SERPL-CCNC: 35 U/L (ref 10–40)
BASOPHILS # BLD AUTO: 0.07 K/UL (ref 0–0.2)
BASOPHILS NFR BLD: 0.8 % (ref 0–1.9)
BILIRUB DIRECT SERPL-MCNC: 0.2 MG/DL (ref 0.1–0.3)
BILIRUB SERPL-MCNC: 0.4 MG/DL (ref 0.1–1)
BUN SERPL-MCNC: 65 MG/DL (ref 8–23)
CA-I BLDV-SCNC: 1.23 MMOL/L (ref 1.06–1.42)
CALCIUM SERPL-MCNC: 9.1 MG/DL (ref 8.7–10.5)
CHLORIDE SERPL-SCNC: 102 MMOL/L (ref 95–110)
CO2 SERPL-SCNC: 21 MMOL/L (ref 23–29)
CREAT SERPL-MCNC: 4.7 MG/DL (ref 0.5–1.4)
DIFFERENTIAL METHOD BLD: ABNORMAL
EOSINOPHIL # BLD AUTO: 0.1 K/UL (ref 0–0.5)
EOSINOPHIL NFR BLD: 1.6 % (ref 0–8)
ERYTHROCYTE [DISTWIDTH] IN BLOOD BY AUTOMATED COUNT: 17.3 % (ref 11.5–14.5)
EST. GFR  (NO RACE VARIABLE): 13.4 ML/MIN/1.73 M^2
GLUCOSE SERPL-MCNC: 158 MG/DL (ref 70–110)
HCT VFR BLD AUTO: 22.6 % (ref 40–54)
HGB BLD-MCNC: 6.9 G/DL (ref 14–18)
IMM GRANULOCYTES # BLD AUTO: 0.06 K/UL (ref 0–0.04)
IMM GRANULOCYTES NFR BLD AUTO: 0.7 % (ref 0–0.5)
INR PPP: 2.5 (ref 0.8–1.2)
LDH SERPL L TO P-CCNC: 324 U/L (ref 110–260)
LYMPHOCYTES # BLD AUTO: 0.8 K/UL (ref 1–4.8)
LYMPHOCYTES NFR BLD: 9.2 % (ref 18–48)
MAGNESIUM SERPL-MCNC: 2.1 MG/DL (ref 1.6–2.6)
MCH RBC QN AUTO: 28.6 PG (ref 27–31)
MCHC RBC AUTO-ENTMCNC: 30.5 G/DL (ref 32–36)
MCV RBC AUTO: 94 FL (ref 82–98)
MONOCYTES # BLD AUTO: 0.5 K/UL (ref 0.3–1)
MONOCYTES NFR BLD: 5.6 % (ref 4–15)
NEUTROPHILS # BLD AUTO: 7.4 K/UL (ref 1.8–7.7)
NEUTROPHILS NFR BLD: 82.1 % (ref 38–73)
NRBC BLD-RTO: 0 /100 WBC
PHOSPHATE SERPL-MCNC: 2.6 MG/DL (ref 2.7–4.5)
PLATELET # BLD AUTO: 486 K/UL (ref 150–450)
PMV BLD AUTO: 9.9 FL (ref 9.2–12.9)
POCT GLUCOSE: 106 MG/DL (ref 70–110)
POCT GLUCOSE: 120 MG/DL (ref 70–110)
POCT GLUCOSE: 153 MG/DL (ref 70–110)
POCT GLUCOSE: 154 MG/DL (ref 70–110)
POCT GLUCOSE: 166 MG/DL (ref 70–110)
POTASSIUM SERPL-SCNC: 4.1 MMOL/L (ref 3.5–5.1)
PROT SERPL-MCNC: 6.7 G/DL (ref 6–8.4)
PROTHROMBIN TIME: 25.7 SEC (ref 9–12.5)
RBC # BLD AUTO: 2.41 M/UL (ref 4.6–6.2)
SODIUM SERPL-SCNC: 133 MMOL/L (ref 136–145)
WBC # BLD AUTO: 8.98 K/UL (ref 3.9–12.7)

## 2023-12-30 PROCEDURE — 80076 HEPATIC FUNCTION PANEL: CPT | Performed by: INTERNAL MEDICINE

## 2023-12-30 PROCEDURE — 63600175 PHARM REV CODE 636 W HCPCS

## 2023-12-30 PROCEDURE — 25000003 PHARM REV CODE 250: Performed by: STUDENT IN AN ORGANIZED HEALTH CARE EDUCATION/TRAINING PROGRAM

## 2023-12-30 PROCEDURE — 85025 COMPLETE CBC W/AUTO DIFF WBC: CPT | Performed by: INTERNAL MEDICINE

## 2023-12-30 PROCEDURE — 20600001 HC STEP DOWN PRIVATE ROOM

## 2023-12-30 PROCEDURE — 85610 PROTHROMBIN TIME: CPT | Performed by: INTERNAL MEDICINE

## 2023-12-30 PROCEDURE — 25000003 PHARM REV CODE 250: Performed by: NURSE PRACTITIONER

## 2023-12-30 PROCEDURE — 80048 BASIC METABOLIC PNL TOTAL CA: CPT | Performed by: NURSE PRACTITIONER

## 2023-12-30 PROCEDURE — 51798 US URINE CAPACITY MEASURE: CPT

## 2023-12-30 PROCEDURE — 97110 THERAPEUTIC EXERCISES: CPT

## 2023-12-30 PROCEDURE — 25000003 PHARM REV CODE 250: Performed by: INTERNAL MEDICINE

## 2023-12-30 PROCEDURE — 97530 THERAPEUTIC ACTIVITIES: CPT

## 2023-12-30 PROCEDURE — 99233 SBSQ HOSP IP/OBS HIGH 50: CPT | Mod: ,,, | Performed by: NURSE PRACTITIONER

## 2023-12-30 PROCEDURE — 82330 ASSAY OF CALCIUM: CPT | Performed by: PHYSICIAN ASSISTANT

## 2023-12-30 PROCEDURE — 80100014 HC HEMODIALYSIS 1:1

## 2023-12-30 PROCEDURE — 63600175 PHARM REV CODE 636 W HCPCS: Performed by: PHYSICIAN ASSISTANT

## 2023-12-30 PROCEDURE — 63600175 PHARM REV CODE 636 W HCPCS: Performed by: NURSE PRACTITIONER

## 2023-12-30 PROCEDURE — 93750 INTERROGATION VAD IN PERSON: CPT | Mod: ,,, | Performed by: INTERNAL MEDICINE

## 2023-12-30 PROCEDURE — 84100 ASSAY OF PHOSPHORUS: CPT | Performed by: INTERNAL MEDICINE

## 2023-12-30 PROCEDURE — 27000248 HC VAD-ADDITIONAL DAY

## 2023-12-30 PROCEDURE — 83615 LACTATE (LD) (LDH) ENZYME: CPT | Performed by: STUDENT IN AN ORGANIZED HEALTH CARE EDUCATION/TRAINING PROGRAM

## 2023-12-30 PROCEDURE — 25000003 PHARM REV CODE 250

## 2023-12-30 PROCEDURE — 83735 ASSAY OF MAGNESIUM: CPT | Performed by: INTERNAL MEDICINE

## 2023-12-30 RX ORDER — WARFARIN SODIUM 5 MG/1
5 TABLET ORAL
Status: DISCONTINUED | OUTPATIENT
Start: 2023-12-30 | End: 2023-12-30 | Stop reason: CLARIF

## 2023-12-30 RX ORDER — WARFARIN 2.5 MG/1
2.5 TABLET ORAL
Status: DISCONTINUED | OUTPATIENT
Start: 2024-01-01 | End: 2023-12-30 | Stop reason: CLARIF

## 2023-12-30 RX ORDER — WARFARIN 2.5 MG/1
2.5 TABLET ORAL
Status: DISCONTINUED | OUTPATIENT
Start: 2024-01-01 | End: 2024-01-08

## 2023-12-30 RX ORDER — WARFARIN SODIUM 5 MG/1
5 TABLET ORAL
Status: DISCONTINUED | OUTPATIENT
Start: 2023-12-30 | End: 2024-01-02

## 2023-12-30 RX ADMIN — INSULIN ASPART 4 UNITS: 100 INJECTION, SOLUTION INTRAVENOUS; SUBCUTANEOUS at 12:12

## 2023-12-30 RX ADMIN — INSULIN ASPART 4 UNITS: 100 INJECTION, SOLUTION INTRAVENOUS; SUBCUTANEOUS at 04:12

## 2023-12-30 RX ADMIN — HEPARIN SODIUM 1000 UNITS: 1000 INJECTION, SOLUTION INTRAVENOUS; SUBCUTANEOUS at 04:12

## 2023-12-30 RX ADMIN — FAMOTIDINE 20 MG: 20 TABLET, FILM COATED ORAL at 08:12

## 2023-12-30 RX ADMIN — SODIUM CHLORIDE: 9 INJECTION, SOLUTION INTRAVENOUS at 01:12

## 2023-12-30 RX ADMIN — ZOLPIDEM TARTRATE 5 MG: 5 TABLET ORAL at 10:12

## 2023-12-30 RX ADMIN — INSULIN ASPART 4 UNITS: 100 INJECTION, SOLUTION INTRAVENOUS; SUBCUTANEOUS at 09:12

## 2023-12-30 RX ADMIN — Medication: at 09:12

## 2023-12-30 RX ADMIN — INSULIN ASPART 2 UNITS: 100 INJECTION, SOLUTION INTRAVENOUS; SUBCUTANEOUS at 12:12

## 2023-12-30 RX ADMIN — FUROSEMIDE 80 MG: 10 INJECTION, SOLUTION INTRAVENOUS at 09:12

## 2023-12-30 RX ADMIN — Medication: at 08:12

## 2023-12-30 RX ADMIN — ACETAMINOPHEN 1000 MG: 500 TABLET ORAL at 09:12

## 2023-12-30 RX ADMIN — GABAPENTIN 125 MG: 250 SOLUTION ORAL at 09:12

## 2023-12-30 RX ADMIN — INSULIN ASPART 4 UNITS: 100 INJECTION, SOLUTION INTRAVENOUS; SUBCUTANEOUS at 08:12

## 2023-12-30 RX ADMIN — AMIODARONE HYDROCHLORIDE 400 MG: 200 TABLET ORAL at 08:12

## 2023-12-30 RX ADMIN — VENLAFAXINE 37.5 MG: 37.5 TABLET ORAL at 08:12

## 2023-12-30 RX ADMIN — FUROSEMIDE 80 MG: 10 INJECTION, SOLUTION INTRAVENOUS at 08:12

## 2023-12-30 RX ADMIN — WARFARIN SODIUM 5 MG: 5 TABLET ORAL at 04:12

## 2023-12-30 RX ADMIN — ACETAMINOPHEN 1000 MG: 500 TABLET ORAL at 08:12

## 2023-12-30 RX ADMIN — INSULIN ASPART 2 UNITS: 100 INJECTION, SOLUTION INTRAVENOUS; SUBCUTANEOUS at 08:12

## 2023-12-30 NOTE — CARE UPDATE
-Glucose Goal 140-180    -A1C:   Hemoglobin A1C   Date Value Ref Range Status   10/12/2023 7.6 (H) 4.0 - 5.6 % Final     Comment:     ADA Screening Guidelines:  5.7-6.4%  Consistent with prediabetes  >or=6.5%  Consistent with diabetes    High levels of fetal hemoglobin interfere with the HbA1C  assay. Heterozygous hemoglobin variants (HbS, HgC, etc)do  not significantly interfere with this assay.   However, presence of multiple variants may affect accuracy.           -HOME REGIMEN: metformin     -GLUCOSE TREND FOR THE PAST 24HRS:   Recent Labs   Lab 12/29/23  0832 12/29/23  1256 12/29/23  1641 12/29/23  1949 12/29/23  2356 12/30/23  0835   POCTGLUCOSE 156* 90 159* 106 120* 166*         -NO HYPOGYCEMIAS NOTED     - Diet  Diet NPO Except for: Medication (per NG tube)    -Steroids - none     -Tube Feeds - TF @ 45ml/hr        Plan:   - Novolog 4 units q 4 hr to cover TF- hold if TF held or BG < 100   - POCT Glucose every 4 hours  - Hypoglycemia protocol in place      ** Please notify Endocrine for any change and/or advance in diet**  ** Please call Endocrine for any BG related issues **     Discharge Planning:   TBD. Please notify endocrinology prior to discharge.

## 2023-12-30 NOTE — SUBJECTIVE & OBJECTIVE
Interval History: No complaints or overnight events. Slept well. Wife at bedside this am.     Continuous Infusions:   sodium chloride 0.9%      dextrose 10 % in water (D10W)       Scheduled Meds:   sodium chloride 0.9%   Intravenous Once    acetaminophen  1,000 mg Per NG tube TID    amiodarone  400 mg Per NG tube Daily    balsam peru-castor oiL   Topical (Top) BID    famotidine  20 mg Per G Tube Daily    furosemide (LASIX) injection  80 mg Intravenous BID    gabapentin  125 mg Per NG tube Q12H    insulin aspart U-100  4 Units Subcutaneous 6 times per day    polyethylene glycol  17 g Per NG tube Daily    psyllium husk (aspartame)  1 packet Per NG tube Daily    senna-docusate 8.6-50 mg  2 tablet Per NG tube Daily    venlafaxine  37.5 mg Per G Tube Daily    [START ON 1/1/2024] warfarin  2.5 mg Per G Tube Every Mon, Wed, Fri    warfarin  5 mg Per G Tube Every Tues, Thurs, Sat, Sun     PRN Meds:bisacodyL, dextrose 10 % in water (D10W), dextrose 10%, dextrose 10%, glucagon (human recombinant), heparin (porcine), hydrALAZINE, insulin aspart U-100, ondansetron, Flushing PICC/Midline Protocol **AND** [DISCONTINUED] sodium chloride 0.9% **AND** sodium chloride 0.9%, traMADoL, zolpidem    Review of patient's allergies indicates:  No Known Allergies  Objective:     Vital Signs (Most Recent):  Temp: 98.2 °F (36.8 °C) (12/30/23 0722)  Pulse: 78 (12/30/23 0722)  Resp: 19 (12/30/23 0722)  BP: 95/68 (12/30/23 0722)  SpO2: 97 % (12/30/23 0722) Vital Signs (24h Range):  Temp:  [97.8 °F (36.6 °C)-98.2 °F (36.8 °C)] 98.2 °F (36.8 °C)  Pulse:  [78-96] 78  Resp:  [16-20] 19  SpO2:  [95 %-98 %] 97 %  BP: (80-99)/(0-82) 95/68     No data found.  Body mass index is 24.55 kg/m².      Intake/Output Summary (Last 24 hours) at 12/30/2023 0843  Last data filed at 12/30/2023 0609  Gross per 24 hour   Intake 1415 ml   Output 10 ml   Net 1405 ml         Hemodynamic Parameters:  CVP:  [9 mmHg] 9 mmHg    Telemetry:        Physical  Exam  Constitutional:       Comments: Cachectic, temporal wasting   HENT:      Head: Normocephalic and atraumatic.   Eyes:      Conjunctiva/sclera: Conjunctivae normal.      Pupils: Pupils are equal, round, and reactive to light.   Neck:      Comments: Do not appreciate elevated JVP  Cardiovascular:      Rate and Rhythm: Normal rate and regular rhythm.      Comments: Smooth VAD hum  Pulmonary:      Effort: Pulmonary effort is normal.      Breath sounds: Normal breath sounds.   Abdominal:      General: Bowel sounds are normal.      Palpations: Abdomen is soft.   Musculoskeletal:         General: No swelling. Normal range of motion.      Cervical back: Normal range of motion and neck supple.   Skin:     General: Skin is warm and dry.      Capillary Refill: Capillary refill takes 2 to 3 seconds.   Neurological:      General: No focal deficit present.      Mental Status: He is alert and oriented to person, place, and time.            Significant Labs:  CBC:  Recent Labs   Lab 12/28/23  0507 12/29/23  0406 12/30/23  0439   WBC 10.11 8.87 8.98   RBC 2.42* 2.41* 2.41*   HGB 7.0* 6.9* 6.9*   HCT 21.6* 22.4* 22.6*    474* 486*   MCV 89 93 94   MCH 28.9 28.6 28.6   MCHC 32.4 30.8* 30.5*       BNP:  Recent Labs   Lab 12/25/23  0503 12/27/23  0450 12/29/23  0406   BNP 2,604* 1,779* 2,177*       CMP:  Recent Labs   Lab 12/28/23  0507 12/29/23  0406 12/30/23  0439   * 138* 158*   CALCIUM 8.7 9.4 9.1   ALBUMIN 1.9* 1.9* 1.9*   PROT 6.7 6.7 6.7    138 133*   K 3.9 4.2 4.1   CO2 23 23 21*    104 102   BUN 69* 38* 65*   CREATININE 4.9* 3.3* 4.7*   ALKPHOS 179* 166* 178*   ALT 11 13 12   AST 37 38 35   BILITOT 0.4 0.4 0.4        Coagulation:   Recent Labs   Lab 12/28/23  0507 12/29/23  0406 12/30/23  0439   INR 2.8* 2.9* 2.5*       LDH:  Recent Labs   Lab 12/28/23  0507 12/29/23  0406 12/30/23  0439   * 348* 324*       Microbiology:  Microbiology Results (last 7 days)       ** No results found for the  last 168 hours. **            I have reviewed all pertinent labs within the past 24 hours.    Estimated Creatinine Clearance: 18.1 mL/min (A) (based on SCr of 4.7 mg/dL (H)).    Diagnostic Results:  I have reviewed and interpreted all pertinent imaging results/findings within the past 24 hours.

## 2023-12-30 NOTE — PLAN OF CARE
Problem: Device-Related Complication Risk (Hemodialysis)  Goal: Safe, Effective Therapy Delivery  Outcome: Ongoing, Progressing     Problem: Hemodynamic Instability (Hemodialysis)  Goal: Effective Tissue Perfusion  Outcome: Ongoing, Progressing     Problem: Infection (Hemodialysis)  Goal: Absence of Infection Signs and Symptoms  Outcome: Ongoing, Progressing     Bedside dialysis tx ended to the right chest perm cath, heparin locked, capped, sterile dressing changed.    Net fluid removed: 1.1L    Report given to Primary nurse

## 2023-12-30 NOTE — PROGRESS NOTES
[facility] - Cardiology Stepdown  Heart Transplant  Progress Note    Patient Name: Radha Abbott  MRN: [id number]  Admission Date: 11/9/2023  Hospital Length of Stay: 51 days  Attending Physician: Brayan Ocampo MD  Primary Care Provider: Vasu Kong MD  Principal Problem:LVAD (left ventricular assist device) present    Subjective:   Interval History: No complaints or overnight events. Slept well. Wife at bedside this am.     Continuous Infusions:   sodium chloride 0.9%      dextrose 10 % in water (D10W)       Scheduled Meds:   sodium chloride 0.9%   Intravenous Once    acetaminophen  1,000 mg Per NG tube TID    amiodarone  400 mg Per NG tube Daily    balsam peru-castor oiL   Topical (Top) BID    famotidine  20 mg Per G Tube Daily    furosemide (LASIX) injection  80 mg Intravenous BID    gabapentin  125 mg Per NG tube Q12H    insulin aspart U-100  4 Units Subcutaneous 6 times per day    polyethylene glycol  17 g Per NG tube Daily    psyllium husk (aspartame)  1 packet Per NG tube Daily    senna-docusate 8.6-50 mg  2 tablet Per NG tube Daily    venlafaxine  37.5 mg Per G Tube Daily    [START ON 1/1/2024] warfarin  2.5 mg Per G Tube Every Mon, Wed, Fri    warfarin  5 mg Per G Tube Every Tues, Thurs, Sat, Sun     PRN Meds:bisacodyL, dextrose 10 % in water (D10W), dextrose 10%, dextrose 10%, glucagon (human recombinant), heparin (porcine), hydrALAZINE, insulin aspart U-100, ondansetron, Flushing PICC/Midline Protocol **AND** [DISCONTINUED] sodium chloride 0.9% **AND** sodium chloride 0.9%, traMADoL, zolpidem    Review of patient's allergies indicates:  No Known Allergies  Objective:     Vital Signs (Most Recent):  Temp: 98.2 °F (36.8 °C) (12/30/23 0722)  Pulse: 78 (12/30/23 0722)  Resp: 19 (12/30/23 0722)  BP: 95/68 (12/30/23 0722)  SpO2: 97 % (12/30/23 0722) Vital Signs (24h Range):  Temp:  [97.8 °F (36.6 °C)-98.2 °F (36.8 °C)] 98.2 °F (36.8 °C)  Pulse:  [78-96] 78  Resp:  [16-20] 19  SpO2:  [95 %-98 %] 97 %  BP:  (80-99)/(0-82) 95/68     No data found.  Body mass index is 24.55 kg/m².      Intake/Output Summary (Last 24 hours) at 12/30/2023 0843  Last data filed at 12/30/2023 0609  Gross per 24 hour   Intake 1415 ml   Output 10 ml   Net 1405 ml         Hemodynamic Parameters:  CVP:  [9 mmHg] 9 mmHg    Telemetry: SR       Physical Exam  Constitutional:       Comments: Cachectic, temporal wasting   HENT:      Head: Normocephalic and atraumatic.   Eyes:      Conjunctiva/sclera: Conjunctivae normal.      Pupils: Pupils are equal, round, and reactive to light.   Neck:      Comments: Do not appreciate elevated JVP  Cardiovascular:      Rate and Rhythm: Normal rate and regular rhythm.      Comments: Smooth VAD hum  Pulmonary:      Effort: Pulmonary effort is normal.      Breath sounds: Normal breath sounds.   Abdominal:      General: Bowel sounds are normal.      Palpations: Abdomen is soft.   Musculoskeletal:         General: No swelling. Normal range of motion.      Cervical back: Normal range of motion and neck supple.   Skin:     General: Skin is warm and dry.      Capillary Refill: Capillary refill takes 2 to 3 seconds.   Neurological:      General: No focal deficit present.      Mental Status: He is alert and oriented to person, place, and time.            Significant Labs:  CBC:  Recent Labs   Lab 12/28/23  0507 12/29/23 0406 12/30/23 0439   WBC 10.11 8.87 8.98   RBC 2.42* 2.41* 2.41*   HGB 7.0* 6.9* 6.9*   HCT 21.6* 22.4* 22.6*    474* 486*   MCV 89 93 94   MCH 28.9 28.6 28.6   MCHC 32.4 30.8* 30.5*       BNP:  Recent Labs   Lab 12/25/23  0503 12/27/23  0450 12/29/23  0406   BNP 2,604* 1,779* 2,177*       CMP:  Recent Labs   Lab 12/28/23  0507 12/29/23 0406 12/30/23  0439   * 138* 158*   CALCIUM 8.7 9.4 9.1   ALBUMIN 1.9* 1.9* 1.9*   PROT 6.7 6.7 6.7    138 133*   K 3.9 4.2 4.1   CO2 23 23 21*    104 102   BUN 69* 38* 65*   CREATININE 4.9* 3.3* 4.7*   ALKPHOS 179* 166* 178*   ALT 11 13 12   AST  37 38 35   BILITOT 0.4 0.4 0.4        Coagulation:   Recent Labs   Lab 12/28/23  0507 12/29/23  0406 12/30/23  0439   INR 2.8* 2.9* 2.5*       LDH:  Recent Labs   Lab 12/28/23  0507 12/29/23  0406 12/30/23  0439   * 348* 324*       Microbiology:  Microbiology Results (last 7 days)       ** No results found for the last 168 hours. **            I have reviewed all pertinent labs within the past 24 hours.    Estimated Creatinine Clearance: 18.1 mL/min (A) (based on SCr of 4.7 mg/dL (H)).    Diagnostic Results:  I have reviewed and interpreted all pertinent imaging results/findings within the past 24 hours.  Assessment and Plan:     63 year old male with hx of CAD s/p 3v CABG (unclear anatomy, 2009), ICM with a recent EF of 15-20% s/p ICD (medtronik 2009), DM2 (a1c 7.7), HTN, HLD, Vfib on amio who presents to the ED with CC of SOB     Pt was recently admitted to [facility] as a transfer.  He was started on a Lasix gtt and did well.  Started on gDMT and discharged home on  5 with plans to follow up in HTS clinic for ongoing transplant evaluation at another facility.  He says that about a few days ago he started to notice LE swelling.  This turned into Nelson and orthopnea.  He says he can't walk to the bathroom without getting SOB.  He also complains of weight gain.  He takes torsemide 20mg BID at home and was told to trial additional Lasix which he did without any improvement.  He was rx metolazone but has not been filled.  He came to the ED     In the ED he was AF with stable VS on RA.  CBC showing chronic anemia.  CMP notable for acute on chronic CKD with baseline around 2.1 and Cr now 2.6.  BNP elevated.  Lactate neg.  CXR showing pulmonary edema.  I evaluated the pt at the bedside.  Bedside TTE showing CVP >15.  He was subsequently admitted to the CCU for diuresis.     He was continued on his home  and was started on a lasix gtt, which he responded well to overnight (net -1700cc. He feels much better this  morning as well. Our transplant coordinators have been working on insurance approval and he is now being transferred to [facility] service for transplant evaluation.     * LVAD (left ventricular assist device) present  -HeartMate 3 Implanted  12/1/2023  as DT  -HTS Primary  -Implanted by Dr. Washington  -Continue Coumadin, dosing by PharmD.  Goal INR 2.0-3.0 . Therapeutic today.   -Antiplatelets Not on ASA  -LDH is stable overall today. Will continue to monitor daily.  -Speed set at  4900   rpm, LSL 4500 rpm   -Interrogation notable for no events  -ECHO 12/11 AV does not open, IVS midline, LVEDD 6.1, with HM3 speed set to 4900 rpm. Will repeat this week.   -Not listed for OHTx, declined for OHTX due to comorbidities         Procedure: Device Interrogation Including analysis of device parameters  Current Settings: Ventricular Assist Device  Review of device function is stable/unstable stable        12/30/2023     8:37 AM 12/30/2023     4:08 AM 12/29/2023    11:53 PM 12/29/2023     8:09 PM 12/29/2023     4:45 PM 12/29/2023    12:50 PM 12/29/2023     8:15 AM   TXP LVAD INTERROGATIONS   Type HeartMate3 HeartMate3 HeartMate3 HeartMate3 HeartMate3 HeartMate3 HeartMate3   Flow 3.5 3.4 3.6 3.6 3.7 3.7 3.6   Speed 4900 4900 4900 4900 4900 4900 4900   PI 6.2 7.3 6.8 5.5 5.5 5.8 5.5   Power ([place]) 3.3 3.2 3.3 3.3 3.3 3.5 3.3   LSL 4500 4500 4500 4500  4500 4500   Low Flow Alarm no         High Power Alarm no         Pulsatility Intermittent pulse Pulse Pulse Pulse            Cachexia  -Daily caloric intake with tube feeds increased to 2160 calories, 98 g protein    Unilateral complete vocal fold paralysis  -Appreciate ENT's help  -S/P augmentation of paralyzed left vocal cord 12/18    Dysphonia  -Speech following closely  -Appreciate ENT's help. S/P augmentation of paralyzed left vocal cord 12/18    Moderate malnutrition  -RD and SLP following  -Tolerating tube feeds. Total daily caloric intake increased to 2160  -Failed MBBS 12/20,  continue TF    Depressed mood  -Psychiatry consulted for depressed mood, SI when left alone  -Recommend sitter when alone (possibly with transition to stepdown).  -Continue Mirtazapine  -Psychology also following  -Added venlafaxine 12/20         IVÁN (acute kidney injury)  -Nephrology following      Acute on chronic combined systolic and diastolic CHF, NYHA class 4  Pt with known ICM with an EF of 25% on home  presented with decompensated HF.      -RHC 11/14 RA 14, PA 70/35, PCWP 32, CO 4.1, CI 2.1  -Repeat RHC 11/20 RA 20, PA 60/29 (39), PCWP 29, CO 4.6, CI 2.3  -ECHO 11/21 showed EF 15-20%, mild RV dysfunction, mild MR, LVED 6.9   -Home  5 has been weaned off  -Home GDMT: Entreso 24/26 BID (on hold 2/2 IVÁN), hydralazine 25 q 8hr, all held prior to LVAD implant  -Home diuretic: Lasix 40 mg BID   -IABP inserted 11/21 and underwent DT HM3 implant 12/1, chest closed 12/4  -ECHO 12/11 AV does not open, IVS midline, LVEDD 6.1, with HM3 speed set to 4900 rpm  -HD Tu, Thurs, Sat.   -No significant hypotension. Will wean midodrine off.       PAF (paroxysmal atrial fibrillation)  Known hx of pAF. In sinus rhythm on admission, but 1 run of AF RVR overnight. He spontaneously converted.  - Continue home amiodarone 400mg qd  - Continue AC      Acute renal failure with acute tubular necrosis superimposed on stage 3b chronic kidney disease  IVÁN on CKD2. Baseline Cr of 1.7-2.0.   - Hold ARNi  -Trend BMPs daily   -Nephrology following   -SLED/SCUF for volume removal began on 12/1. He is anuric  -Did not respond to diuretic challenge with 240 mg IV Lasix, 1000 mg Diuril, and 500 mg IV Diamox done 12/14  -Now on HD  -Tunneled trialysis line placed 12/22 by IR    Type 2 diabetes mellitus without complication, without long-term current use of insulin  -A1c 7.6, not insulin dependent  - Endocrine following, appreciate assistance with blood glucose management    CAD (coronary artery disease)  -S/p 3vCABG in 2009  -Lipitor on hold  2/2 mild transaminitis  -Not on ASA post VAD    Ventricular tachycardia  Hx VT s/p ICD placement (medtronic 2009)  - Continue home amio 400mg qd        [place]o [place], NP  Heart Transplant  [facility] - Cardiology Stepdown

## 2023-12-30 NOTE — PT/OT/SLP PROGRESS
Occupational Therapy   Treatment    Name: Radha Abbott  MRN: [id number]  Admitting Diagnosis:  LVAD (left ventricular assist device) present  26 Days Post-Op    Recommendations:     Discharge Recommendations: High Intensity Therapy  Discharge Equipment Recommendations:  other (see comments) (continue to assess DME needs)  Barriers to discharge:  None    Assessment:     Radha Abbott is a 63 y.o. male with a medical diagnosis of LVAD (left ventricular assist device) present.  He presents with education on FM activities and therapeutic exercises to promote strengthening necessary. Patient and wife educated on need for completion of these outside of therapy sessions in order to gain strength. Performance deficits affecting function are impaired endurance, weakness, impaired self care skills, impaired balance, impaired cardiopulmonary response to activity, decreased upper extremity function, decreased lower extremity function, impaired functional mobility. Patient has demonstrated sufficient progression to warrant high intensity therapy evidenced by objectives noted below.     Rehab Prognosis:  Good; patient would benefit from acute skilled OT services to address these deficits and reach maximum level of function.       Plan:     Patient to be seen 6 x/week to address the above listed problems via therapeutic activities, self-care/home management, therapeutic exercises, neuromuscular re-education  Plan of Care Expires: 01/02/24  Plan of Care Reviewed with: patient, spouse    Subjective     Chief Complaint: none verbalized   Patient/Family Comments/goals: get better   Pain/Comfort:  Pain Rating 1: 0/10  Pain Rating Post-Intervention 1: 0/10    Objective:     Communicated with: nursing prior to session.  Patient found HOB elevated with telemetry, NG tube, LVAD upon OT entry to room. Patient's wife and family present in room during session.       General Precautions: Standard, LVAD, fall, sternal    Orthopedic  Precautions:N/A  Braces: N/A  Respiratory Status: Room air     Occupational Performance:     Bed Mobility:    Patient completed Supine to Sit with moderate assistance  Patient completed Sit to Supine with maximal assistance     Functional Mobility/Transfers:  Patient completed Sit <> Stand Transfer with maximal assistance and of 2 persons  with  no assistive device x2 attempts; patient achieving fully upright posture on both stances, but requiring significant cueing and physical assistance at hips; able to maintain proper posturing for ~30 seconds      [facility] 6 Click ADL: 12    Treatment & Education:  Patient remained on wall power throughout treatment session.    Patient given yellow theraputty and demonstrated compliance on the following exercise: alternating hand composite fist, pincer , and hand translation x5 reps each hand. Patient exhibiting wrist flexion/extension against hand strengthening device.     Patient educated on:   -purpose of OT and OT POC  -facilitation and education on proper body mechanics, energy conservation, and safety  -importance of early mobility and out of bed activities with staff assist  -overall benefits of therapy     All questions answered within OT scope and to patient's satisfaction    Patient left with bed in chair position with all lines intact, call button in reach, and wife present    GOALS:   Multidisciplinary Problems       Occupational Therapy Goals          Problem: Occupational Therapy    Goal Priority Disciplines Outcome Interventions   Occupational Therapy Goal     OT, PT/OT Ongoing, Progressing    Description: Goals to be met by: 1/2/24     Patient will increase functional independence with ADLs by performing:    UB Dressing with SBA  LE Dressing with Supervision.  Grooming while standing at sink with Supervision.  Toileting from bedside commode with SBA for hygiene and clothing management.   Step transfer to bedside commode with SBA  [place] with VAD  management during ADLs                             Time Tracking:     OT Date of Treatment: 12/30/23  OT Start Time: 1140  OT Stop Time: 1215  OT Total Time (min): 35 min    Billable Minutes:Therapeutic Activity 15  Therapeutic Exercise 20    OT/PRIETO: OT     Number of PRIETO visits since last OT visit: 0    12/30/2023

## 2023-12-30 NOTE — PLAN OF CARE
Problem: Adult Inpatient Plan of Care  Goal: Patient-Specific Goal (Individualized)  Outcome: Ongoing, Progressing  Flowsheets (Taken 12/29/2023 1831)  Anxieties, Fears or Concerns: PT/OT/SLP  Individualized Care Needs: tube feeds, therapy     Problem: Diabetes Comorbidity  Goal: Blood Glucose Level Within Targeted Range  Outcome: Ongoing, Progressing  Intervention: Monitor and Manage Glycemia  Flowsheets (Taken 12/29/2023 1831)  Glycemic Management:   blood glucose monitored   supplemental insulin given     Problem: Renal Function Impairment (Acute Kidney Injury/Impairment)  Goal: Effective Renal Function  Outcome: Ongoing, Progressing  Intervention: Monitor and Support Renal Function  Flowsheets (Taken 12/29/2023 1831)  Medication Review/Management: medications reviewed       Plan of care discussed with patient.  Patient ambulating with assistance from PT and platform walker, fall precautions in place. Pt only tolerated x1 hour in chair today. Continuing to encourage sternal precautions, IS, and ambulation. LVAD DP and numbers WNL, smooth LVAD hum. Tube feeds maintained per order, no residuals. BG monitored. Discussed medications and care. Encouraged workbook, reviewed education, filled in binder. Patient has no questions at this time.

## 2023-12-30 NOTE — PLAN OF CARE
Pt free of falls and injury. Pt AAOx4. Fall precautions remain in place. Sternal precautions maintained. Pt remains on room air. Sats 95-96%. NSR on telemetry with LVAD artifact. LVAD hum present and smooth. VAD numbers and dopplers WDL. DLES dressing CDI. Plan of care reviewed with pt. Tube feed continued as ordered. Glucose monitored as ordered and scheduled insulin was given as ordered. Pt resting comfortably with no complaints of pain. Pt denies chest pain, headache, and SOB. No acute distress noted,  plan of care continues.      Problem: Adult Inpatient Plan of Care  Goal: Plan of Care Review  12/30/2023 0721 by Simon Miller LPN  Outcome: Ongoing, Progressing  12/30/2023 0713 by Simon Miller LPN  Outcome: Ongoing, Progressing  Goal: Patient-Specific Goal (Individualized)  12/30/2023 0721 by Simon Miller LPN  Outcome: Ongoing, Progressing  12/30/2023 0713 by Simon Miller LPN  Outcome: Ongoing, Progressing  Goal: Absence of Hospital-Acquired Illness or Injury  12/30/2023 0721 by Simon Miller LPN  Outcome: Ongoing, Progressing  12/30/2023 0713 by Simon Miller LPN  Outcome: Ongoing, Progressing  Goal: Optimal Comfort and Wellbeing  12/30/2023 0721 by Simon Miller LPN  Outcome: Ongoing, Progressing  12/30/2023 0713 by Simon Miller LPN  Outcome: Ongoing, Progressing  Goal: Readiness for Transition of Care  12/30/2023 0721 by Simon Miller LPN  Outcome: Ongoing, Progressing  12/30/2023 0713 by Simon Miller LPN  Outcome: Ongoing, Progressing     Problem: Diabetes Comorbidity  Goal: Blood Glucose Level Within Targeted Range  12/30/2023 0721 by Simon Miller LPN  Outcome: Ongoing, Progressing  12/30/2023 0713 by Simon Miller LPN  Outcome: Ongoing, Progressing     Problem: Fluid and Electrolyte Imbalance (Acute Kidney Injury/Impairment)  Goal: Fluid and Electrolyte Balance  12/30/2023 0721 by Simon Miller LPN  Outcome: Ongoing, Progressing  12/30/2023 0713 by Simon Miller  LPN  Outcome: Ongoing, Progressing     Problem: Oral Intake Inadequate (Acute Kidney Injury/Impairment)  Goal: Optimal Nutrition Intake  12/30/2023 0721 by Simon Miller LPN  Outcome: Ongoing, Progressing  12/30/2023 0713 by Simon Miller LPN  Outcome: Ongoing, Progressing     Problem: Renal Function Impairment (Acute Kidney Injury/Impairment)  Goal: Effective Renal Function  12/30/2023 0721 by Simon Miller LPN  Outcome: Ongoing, Progressing  12/30/2023 0713 by Simon Miller LPN  Outcome: Ongoing, Progressing     Problem: Infection  Goal: Absence of Infection Signs and Symptoms  12/30/2023 0721 by Simon Miller LPN  Outcome: Ongoing, Progressing  12/30/2023 0713 by Simon Miller LPN  Outcome: Ongoing, Progressing     Problem: Adjustment to Illness (Heart Failure)  Goal: Optimal Coping  12/30/2023 0721 by Simon Miller LPN  Outcome: Ongoing, Progressing  12/30/2023 0713 by Simon Miller LPN  Outcome: Ongoing, Progressing     Problem: Cardiac Output Decreased (Heart Failure)  Goal: Optimal Cardiac Output  12/30/2023 0721 by Simon Miller LPN  Outcome: Ongoing, Progressing  12/30/2023 0713 by Simon Miller LPN  Outcome: Ongoing, Progressing     Problem: Dysrhythmia (Heart Failure)  Goal: Stable Heart Rate and Rhythm  12/30/2023 0721 by Simon Miller LPN  Outcome: Ongoing, Progressing  12/30/2023 0713 by Simon Miller LPN  Outcome: Ongoing, Progressing     Problem: Fluid Imbalance (Heart Failure)  Goal: Fluid Balance  12/30/2023 0721 by Simon Miller LPN  Outcome: Ongoing, Progressing  12/30/2023 0713 by Simon Miller LPN  Outcome: Ongoing, Progressing     Problem: Functional Ability Impaired (Heart Failure)  Goal: Optimal Functional Ability  12/30/2023 0721 by Simon Miller LPN  Outcome: Ongoing, Progressing  12/30/2023 0713 by Simon Miller LPN  Outcome: Ongoing, Progressing     Problem: Oral Intake Inadequate (Heart Failure)  Goal: Optimal Nutrition Intake  12/30/2023 0721 by Paul  BEAU Montes  Outcome: Ongoing, Progressing  12/30/2023 0713 by Simon Miller LPN  Outcome: Ongoing, Progressing     Problem: Respiratory Compromise (Heart Failure)  Goal: Effective Oxygenation and Ventilation  12/30/2023 0721 by Simon Miller LPN  Outcome: Ongoing, Progressing  12/30/2023 0713 by Simon Miller LPN  Outcome: Ongoing, Progressing     Problem: Sleep Disordered Breathing (Heart Failure)  Goal: Effective Breathing Pattern During Sleep  12/30/2023 0721 by Simon Miller LPN  Outcome: Ongoing, Progressing  12/30/2023 0713 by Simon Miller LPN  Outcome: Ongoing, Progressing     Problem: Cardiac Output Decreased  Goal: Effective Cardiac Output  12/30/2023 0721 by Simon Miller LPN  Outcome: Ongoing, Progressing  12/30/2023 0713 by Simon Miller LPN  Outcome: Ongoing, Progressing     Problem: Fall Injury Risk  Goal: Absence of Fall and Fall-Related Injury  12/30/2023 0721 by Simon Miller LPN  Outcome: Ongoing, Progressing  12/30/2023 0713 by Simon Miller LPN  Outcome: Ongoing, Progressing     Problem: Coping Ineffective  Goal: Effective Coping  12/30/2023 0721 by Simon Miller LPN  Outcome: Ongoing, Progressing  12/30/2023 0713 by Simon Miller LPN  Outcome: Ongoing, Progressing     Problem: Skin Injury Risk Increased  Goal: Skin Health and Integrity  12/30/2023 0721 by Simon Miller LPN  Outcome: Ongoing, Progressing  12/30/2023 0713 by Simon Miller LPN  Outcome: Ongoing, Progressing     Problem: Adjustment to Device (Ventricular Assist Device)  Goal: Optimal Adjustment to Device  12/30/2023 0721 by Simon Miller LPN  Outcome: Ongoing, Progressing  12/30/2023 0713 by Simon Miller LPN  Outcome: Ongoing, Progressing     Problem: Bleeding (Ventricular Assist Device)  Goal: Absence of Bleeding  12/30/2023 0721 by Simon Miller LPN  Outcome: Ongoing, Progressing  12/30/2023 0713 by Simon Miller LPN  Outcome: Ongoing, Progressing     Problem: Embolism (Ventricular Assist  Device)  Goal: Absence of Embolism Signs and Symptoms  12/30/2023 0721 by Simon Miller LPN  Outcome: Ongoing, Progressing  12/30/2023 0713 by Simon Miller LPN  Outcome: Ongoing, Progressing     Problem: Hemodynamic Instability (Ventricular Assist Device)  Goal: Optimal Blood Flow  12/30/2023 0721 by Simon Miller LPN  Outcome: Ongoing, Progressing  12/30/2023 0713 by Simon Miller LPN  Outcome: Ongoing, Progressing     Problem: Infection (Ventricular Assist Device)  Goal: Absence of Infection Signs and Symptoms  12/30/2023 0721 by Simon Miller LPN  Outcome: Ongoing, Progressing  12/30/2023 0713 by Simon Miller LPN  Outcome: Ongoing, Progressing     Problem: Right-Sided Heart Compromise (Ventricular Assist Device)  Goal: Effective Right-Sided Heart Function  12/30/2023 0721 by Simon Miller LPN  Outcome: Ongoing, Progressing  12/30/2023 0713 by Simon Miller LPN  Outcome: Ongoing, Progressing     Problem: Device-Related Complication Risk (CRRT (Continuous Renal Replacement Therapy))  Goal: Safe, Effective Therapy Delivery  12/30/2023 0721 by Simon Miller LPN  Outcome: Ongoing, Progressing  12/30/2023 0713 by Simon Miller LPN  Outcome: Ongoing, Progressing     Problem: Hypothermia (CRRT (Continuous Renal Replacement Therapy))  Goal: Body Temperature Maintained in Desired Range  12/30/2023 0721 by Simon Miller LPN  Outcome: Ongoing, Progressing  12/30/2023 0713 by Simon Miller LPN  Outcome: Ongoing, Progressing     Problem: Infection (CRRT (Continuous Renal Replacement Therapy))  Goal: Absence of Infection Signs and Symptoms  12/30/2023 0721 by Simon Miller LPN  Outcome: Ongoing, Progressing  12/30/2023 0713 by Simon Miller LPN  Outcome: Ongoing, Progressing     Problem: Skin and Tissue Injury (Artificial Airway)  Goal: Absence of Device-Related Skin or Tissue Injury  12/30/2023 0721 by Simon Miller LPN  Outcome: Ongoing, Progressing  12/30/2023 0713 by Simon Miller  LPN  Outcome: Ongoing, Progressing     Problem: Adjustment to Illness (Sepsis/Septic Shock)  Goal: Optimal Coping  12/30/2023 0721 by Simon Miller LPN  Outcome: Ongoing, Progressing  12/30/2023 0713 by Simon Miller LPN  Outcome: Ongoing, Progressing     Problem: Bleeding (Sepsis/Septic Shock)  Goal: Absence of Bleeding  12/30/2023 0721 by Simon Miller LPN  Outcome: Ongoing, Progressing  12/30/2023 0713 by Simon Miller LPN  Outcome: Ongoing, Progressing     Problem: Glycemic Control Impaired (Sepsis/Septic Shock)  Goal: Blood Glucose Level Within Desired Range  12/30/2023 0721 by Simon Miller LPN  Outcome: Ongoing, Progressing  12/30/2023 0713 by Simon Miller LPN  Outcome: Ongoing, Progressing     Problem: Infection Progression (Sepsis/Septic Shock)  Goal: Absence of Infection Signs and Symptoms  12/30/2023 0721 by Simon Miller LPN  Outcome: Ongoing, Progressing  12/30/2023 0713 by Simon Miller LPN  Outcome: Ongoing, Progressing     Problem: Nutrition Impaired (Sepsis/Septic Shock)  Goal: Optimal Nutrition Intake  12/30/2023 0721 by Simon Miller LPN  Outcome: Ongoing, Progressing  12/30/2023 0713 by Simon Miller LPN  Outcome: Ongoing, Progressing

## 2023-12-30 NOTE — PLAN OF CARE
Pt free of falls and injury. Pt AAOx4 with VSS. Fall precautions remained in place. Pt remained on room air. Sats 97%. NSR - Atrial paced on telemetry. Plan of care reviewed with the patient and family. Continued to monitor groin sites and pulses. Pt did complain of pain in the abdomen. 6/10 rating. Called MD about prn medication. And ordered to give tylenol. Pt states she feels better after the PRN medication. Pt denies chest pain, headache, and SOB. No acute distress noted. Plan of care continues.     Problem: Adult Inpatient Plan of Care  Goal: Plan of Care Review  Outcome: Ongoing, Progressing  Goal: Patient-Specific Goal (Individualized)  Outcome: Ongoing, Progressing  Goal: Absence of Hospital-Acquired Illness or Injury  Outcome: Ongoing, Progressing  Goal: Optimal Comfort and Wellbeing  Outcome: Ongoing, Progressing  Goal: Readiness for Transition of Care  Outcome: Ongoing, Progressing     Problem: Diabetes Comorbidity  Goal: Blood Glucose Level Within Targeted Range  Outcome: Ongoing, Progressing     Problem: Fluid and Electrolyte Imbalance (Acute Kidney Injury/Impairment)  Goal: Fluid and Electrolyte Balance  Outcome: Ongoing, Progressing     Problem: Oral Intake Inadequate (Acute Kidney Injury/Impairment)  Goal: Optimal Nutrition Intake  Outcome: Ongoing, Progressing     Problem: Renal Function Impairment (Acute Kidney Injury/Impairment)  Goal: Effective Renal Function  Outcome: Ongoing, Progressing     Problem: Infection  Goal: Absence of Infection Signs and Symptoms  Outcome: Ongoing, Progressing     Problem: Adjustment to Illness (Heart Failure)  Goal: Optimal Coping  Outcome: Ongoing, Progressing     Problem: Cardiac Output Decreased (Heart Failure)  Goal: Optimal Cardiac Output  Outcome: Ongoing, Progressing     Problem: Dysrhythmia (Heart Failure)  Goal: Stable Heart Rate and Rhythm  Outcome: Ongoing, Progressing     Problem: Fluid Imbalance (Heart Failure)  Goal: Fluid Balance  Outcome: Ongoing,  Progressing     Problem: Functional Ability Impaired (Heart Failure)  Goal: Optimal Functional Ability  Outcome: Ongoing, Progressing     Problem: Oral Intake Inadequate (Heart Failure)  Goal: Optimal Nutrition Intake  Outcome: Ongoing, Progressing     Problem: Respiratory Compromise (Heart Failure)  Goal: Effective Oxygenation and Ventilation  Outcome: Ongoing, Progressing     Problem: Sleep Disordered Breathing (Heart Failure)  Goal: Effective Breathing Pattern During Sleep  Outcome: Ongoing, Progressing     Problem: Cardiac Output Decreased  Goal: Effective Cardiac Output  Outcome: Ongoing, Progressing     Problem: Fall Injury Risk  Goal: Absence of Fall and Fall-Related Injury  Outcome: Ongoing, Progressing     Problem: Coping Ineffective  Goal: Effective Coping  Outcome: Ongoing, Progressing     Problem: Skin Injury Risk Increased  Goal: Skin Health and Integrity  Outcome: Ongoing, Progressing     Problem: Adjustment to Device (Ventricular Assist Device)  Goal: Optimal Adjustment to Device  Outcome: Ongoing, Progressing     Problem: Bleeding (Ventricular Assist Device)  Goal: Absence of Bleeding  Outcome: Ongoing, Progressing     Problem: Embolism (Ventricular Assist Device)  Goal: Absence of Embolism Signs and Symptoms  Outcome: Ongoing, Progressing     Problem: Hemodynamic Instability (Ventricular Assist Device)  Goal: Optimal Blood Flow  Outcome: Ongoing, Progressing     Problem: Infection (Ventricular Assist Device)  Goal: Absence of Infection Signs and Symptoms  Outcome: Ongoing, Progressing     Problem: Right-Sided Heart Compromise (Ventricular Assist Device)  Goal: Effective Right-Sided Heart Function  Outcome: Ongoing, Progressing     Problem: Device-Related Complication Risk (CRRT (Continuous Renal Replacement Therapy))  Goal: Safe, Effective Therapy Delivery  Outcome: Ongoing, Progressing     Problem: Hypothermia (CRRT (Continuous Renal Replacement Therapy))  Goal: Body Temperature Maintained in  Desired Range  Outcome: Ongoing, Progressing     Problem: Infection (CRRT (Continuous Renal Replacement Therapy))  Goal: Absence of Infection Signs and Symptoms  Outcome: Ongoing, Progressing     Problem: Skin and Tissue Injury (Artificial Airway)  Goal: Absence of Device-Related Skin or Tissue Injury  Outcome: Ongoing, Progressing     Problem: Adjustment to Illness (Sepsis/Septic Shock)  Goal: Optimal Coping  Outcome: Ongoing, Progressing     Problem: Bleeding (Sepsis/Septic Shock)  Goal: Absence of Bleeding  Outcome: Ongoing, Progressing     Problem: Glycemic Control Impaired (Sepsis/Septic Shock)  Goal: Blood Glucose Level Within Desired Range  Outcome: Ongoing, Progressing     Problem: Infection Progression (Sepsis/Septic Shock)  Goal: Absence of Infection Signs and Symptoms  Outcome: Ongoing, Progressing     Problem: Nutrition Impaired (Sepsis/Septic Shock)  Goal: Optimal Nutrition Intake  Outcome: Ongoing, Progressing

## 2023-12-30 NOTE — PLAN OF CARE
AAOX4,VSS,O2 sats>97% on RA.Patient rolling with assistx1,fall precautions in place, no falls/injuries through the shift.LVAD DP and numbers WNL, smooth LVAD hum. Patient has no complaints of chest pain/SOB/palpitations/HA.Discussed medications and care.Patient has no questions at this time.Pt visualised and stable with no acute distress.Pt resting well,call light within reach, bed at the lowest position.    Problem: Adult Inpatient Plan of Care  Goal: Plan of Care Review  Outcome: Ongoing, Progressing  Goal: Patient-Specific Goal (Individualized)  Outcome: Ongoing, Progressing  Goal: Absence of Hospital-Acquired Illness or Injury  Outcome: Ongoing, Progressing  Goal: Optimal Comfort and Wellbeing  Outcome: Ongoing, Progressing  Goal: Readiness for Transition of Care  Outcome: Ongoing, Progressing     Problem: Diabetes Comorbidity  Goal: Blood Glucose Level Within Targeted Range  Outcome: Ongoing, Progressing     Problem: Fluid and Electrolyte Imbalance (Acute Kidney Injury/Impairment)  Goal: Fluid and Electrolyte Balance  Outcome: Ongoing, Progressing     Problem: Oral Intake Inadequate (Acute Kidney Injury/Impairment)  Goal: Optimal Nutrition Intake  Outcome: Ongoing, Progressing     Problem: Renal Function Impairment (Acute Kidney Injury/Impairment)  Goal: Effective Renal Function  Outcome: Ongoing, Progressing     Problem: Infection  Goal: Absence of Infection Signs and Symptoms  Outcome: Ongoing, Progressing     Problem: Adjustment to Illness (Heart Failure)  Goal: Optimal Coping  Outcome: Ongoing, Progressing     Problem: Cardiac Output Decreased (Heart Failure)  Goal: Optimal Cardiac Output  Outcome: Ongoing, Progressing     Problem: Dysrhythmia (Heart Failure)  Goal: Stable Heart Rate and Rhythm  Outcome: Ongoing, Progressing     Problem: Fluid Imbalance (Heart Failure)  Goal: Fluid Balance  Outcome: Ongoing, Progressing     Problem: Functional Ability Impaired (Heart Failure)  Goal: Optimal Functional  Ability  Outcome: Ongoing, Progressing     Problem: Oral Intake Inadequate (Heart Failure)  Goal: Optimal Nutrition Intake  Outcome: Ongoing, Progressing     Problem: Cardiac Output Decreased  Goal: Effective Cardiac Output  Outcome: Ongoing, Progressing     Problem: Fall Injury Risk  Goal: Absence of Fall and Fall-Related Injury  Outcome: Ongoing, Progressing     Problem: Coping Ineffective  Goal: Effective Coping  Outcome: Ongoing, Progressing     Problem: Skin Injury Risk Increased  Goal: Skin Health and Integrity  Outcome: Ongoing, Progressing     Problem: Adjustment to Device (Ventricular Assist Device)  Goal: Optimal Adjustment to Device  Outcome: Ongoing, Progressing     Problem: Bleeding (Ventricular Assist Device)  Goal: Absence of Bleeding  Outcome: Ongoing, Progressing     Problem: Embolism (Ventricular Assist Device)  Goal: Absence of Embolism Signs and Symptoms  Outcome: Ongoing, Progressing     Problem: Hemodynamic Instability (Ventricular Assist Device)  Goal: Optimal Blood Flow  Outcome: Ongoing, Progressing     Problem: Infection (Ventricular Assist Device)  Goal: Absence of Infection Signs and Symptoms  Outcome: Ongoing, Progressing     Problem: Glycemic Control Impaired (Sepsis/Septic Shock)  Goal: Blood Glucose Level Within Desired Range  Outcome: Ongoing, Progressing     Problem: Nutrition Impaired (Sepsis/Septic Shock)  Goal: Optimal Nutrition Intake  Outcome: Ongoing, Progressing

## 2023-12-30 NOTE — ASSESSMENT & PLAN NOTE
-HeartMate 3 Implanted  12/1/2023  as DT  -HTS Primary  -Implanted by Dr. Washington  -Continue Coumadin, dosing by PharmD.  Goal INR 2.0-3.0 . Therapeutic today.   -Antiplatelets Not on ASA  -LDH is stable overall today. Will continue to monitor daily.  -Speed set at  4900   rpm, LSL 4500 rpm   -Interrogation notable for no events  -ECHO 12/11 AV does not open, IVS midline, LVEDD 6.1, with HM3 speed set to 4900 rpm. Will repeat this week.   -Not listed for OHTx, declined for OHTX due to comorbidities         Procedure: Device Interrogation Including analysis of device parameters  Current Settings: Ventricular Assist Device  Review of device function is stable/unstable stable        12/30/2023     8:37 AM 12/30/2023     4:08 AM 12/29/2023    11:53 PM 12/29/2023     8:09 PM 12/29/2023     4:45 PM 12/29/2023    12:50 PM 12/29/2023     8:15 AM   TXP LVAD INTERROGATIONS   Type HeartMate3 HeartMate3 HeartMate3 HeartMate3 HeartMate3 HeartMate3 HeartMate3   Flow 3.5 3.4 3.6 3.6 3.7 3.7 3.6   Speed 4900 4900 4900 4900 4900 4900 4900   PI 6.2 7.3 6.8 5.5 5.5 5.8 5.5   Power ([place]) 3.3 3.2 3.3 3.3 3.3 3.5 3.3   LSL 4500 4500 4500 4500  4500 4500   Low Flow Alarm no         High Power Alarm no         Pulsatility Intermittent pulse Pulse Pulse Pulse

## 2023-12-30 NOTE — NURSING
Bedside dialysis tx started to the right chest perm cath per orders placed by Dr. Hilliard:    Order Questions    Question Answer   Antibiotics on HD? No   Duration of Treatment 3 hours   Dialyzer F160   Dialysate Temperature (C) 36.5    mL/min    mL/min   K+ Potassium per Protocol   Ca++ Calcium per Protocol   Na+ Sodium per Protocol   Bicarb Bicarbonate per Protocol   Access Other (please specify)   Fluid Removal (L) 2L   Fluid Removal Instructions maintain doppler pressures > 65 mmHG   Dialysate Bath Solution Protocol (DO NOT MODIFY ANSWER) [email or web address] Dialysate Bath Solution Algorithm (formatted with date).pdf

## 2023-12-31 PROBLEM — R64 CACHEXIA: Status: RESOLVED | Noted: 2023-12-18 | Resolved: 2023-12-31

## 2023-12-31 LAB
ABO + RH BLD: NORMAL
ALBUMIN SERPL BCP-MCNC: 1.9 G/DL (ref 3.5–5.2)
ALP SERPL-CCNC: 179 U/L (ref 55–135)
ALT SERPL W/O P-5'-P-CCNC: 13 U/L (ref 10–44)
ANION GAP SERPL CALC-SCNC: 7 MMOL/L (ref 8–16)
AST SERPL-CCNC: 37 U/L (ref 10–40)
BASOPHILS # BLD AUTO: 0.06 K/UL (ref 0–0.2)
BASOPHILS NFR BLD: 0.8 % (ref 0–1.9)
BILIRUB DIRECT SERPL-MCNC: 0.2 MG/DL (ref 0.1–0.3)
BILIRUB SERPL-MCNC: 0.4 MG/DL (ref 0.1–1)
BLD GP AB SCN CELLS X3 SERPL QL: NORMAL
BUN SERPL-MCNC: 36 MG/DL (ref 8–23)
CALCIUM SERPL-MCNC: 8 MG/DL (ref 8.7–10.5)
CHLORIDE SERPL-SCNC: 98 MMOL/L (ref 95–110)
CO2 SERPL-SCNC: 27 MMOL/L (ref 23–29)
CREAT SERPL-MCNC: 3.1 MG/DL (ref 0.5–1.4)
DIFFERENTIAL METHOD BLD: ABNORMAL
EOSINOPHIL # BLD AUTO: 0.1 K/UL (ref 0–0.5)
EOSINOPHIL NFR BLD: 1.6 % (ref 0–8)
ERYTHROCYTE [DISTWIDTH] IN BLOOD BY AUTOMATED COUNT: 16.8 % (ref 11.5–14.5)
EST. GFR  (NO RACE VARIABLE): 22 ML/MIN/1.73 M^2
GLUCOSE SERPL-MCNC: 126 MG/DL (ref 70–110)
HCT VFR BLD AUTO: 22.4 % (ref 40–54)
HGB BLD-MCNC: 7 G/DL (ref 14–18)
IMM GRANULOCYTES # BLD AUTO: 0.05 K/UL (ref 0–0.04)
IMM GRANULOCYTES NFR BLD AUTO: 0.6 % (ref 0–0.5)
INR PPP: 2.4 (ref 0.8–1.2)
LDH SERPL L TO P-CCNC: 369 U/L (ref 110–260)
LYMPHOCYTES # BLD AUTO: 0.7 K/UL (ref 1–4.8)
LYMPHOCYTES NFR BLD: 8.5 % (ref 18–48)
MAGNESIUM SERPL-MCNC: 1.9 MG/DL (ref 1.6–2.6)
MCH RBC QN AUTO: 28.7 PG (ref 27–31)
MCHC RBC AUTO-ENTMCNC: 31.3 G/DL (ref 32–36)
MCV RBC AUTO: 92 FL (ref 82–98)
MONOCYTES # BLD AUTO: 0.6 K/UL (ref 0.3–1)
MONOCYTES NFR BLD: 7.6 % (ref 4–15)
NEUTROPHILS # BLD AUTO: 6.4 K/UL (ref 1.8–7.7)
NEUTROPHILS NFR BLD: 80.9 % (ref 38–73)
NRBC BLD-RTO: 0 /100 WBC
PHOSPHATE SERPL-MCNC: 1.5 MG/DL (ref 2.7–4.5)
PLATELET # BLD AUTO: 452 K/UL (ref 150–450)
PMV BLD AUTO: 9.8 FL (ref 9.2–12.9)
POCT GLUCOSE: 106 MG/DL (ref 70–110)
POCT GLUCOSE: 125 MG/DL (ref 70–110)
POCT GLUCOSE: 126 MG/DL (ref 70–110)
POCT GLUCOSE: 129 MG/DL (ref 70–110)
POCT GLUCOSE: 145 MG/DL (ref 70–110)
POCT GLUCOSE: 154 MG/DL (ref 70–110)
POCT GLUCOSE: 86 MG/DL (ref 70–110)
POTASSIUM SERPL-SCNC: 3.4 MMOL/L (ref 3.5–5.1)
PROT SERPL-MCNC: 6.7 G/DL (ref 6–8.4)
PROTHROMBIN TIME: 24.6 SEC (ref 9–12.5)
RBC # BLD AUTO: 2.44 M/UL (ref 4.6–6.2)
SODIUM SERPL-SCNC: 132 MMOL/L (ref 136–145)
SPECIMEN OUTDATE: NORMAL
WBC # BLD AUTO: 7.92 K/UL (ref 3.9–12.7)

## 2023-12-31 PROCEDURE — 93750 INTERROGATION VAD IN PERSON: CPT | Mod: ,,, | Performed by: INTERNAL MEDICINE

## 2023-12-31 PROCEDURE — 85025 COMPLETE CBC W/AUTO DIFF WBC: CPT | Performed by: INTERNAL MEDICINE

## 2023-12-31 PROCEDURE — 27000248 HC VAD-ADDITIONAL DAY

## 2023-12-31 PROCEDURE — 83735 ASSAY OF MAGNESIUM: CPT | Performed by: INTERNAL MEDICINE

## 2023-12-31 PROCEDURE — 97116 GAIT TRAINING THERAPY: CPT

## 2023-12-31 PROCEDURE — 25000003 PHARM REV CODE 250

## 2023-12-31 PROCEDURE — 99900035 HC TECH TIME PER 15 MIN (STAT)

## 2023-12-31 PROCEDURE — 83615 LACTATE (LD) (LDH) ENZYME: CPT | Performed by: STUDENT IN AN ORGANIZED HEALTH CARE EDUCATION/TRAINING PROGRAM

## 2023-12-31 PROCEDURE — 80048 BASIC METABOLIC PNL TOTAL CA: CPT | Performed by: NURSE PRACTITIONER

## 2023-12-31 PROCEDURE — 25000003 PHARM REV CODE 250: Performed by: INTERNAL MEDICINE

## 2023-12-31 PROCEDURE — 85610 PROTHROMBIN TIME: CPT | Performed by: INTERNAL MEDICINE

## 2023-12-31 PROCEDURE — 20600001 HC STEP DOWN PRIVATE ROOM

## 2023-12-31 PROCEDURE — 97530 THERAPEUTIC ACTIVITIES: CPT

## 2023-12-31 PROCEDURE — 25000003 PHARM REV CODE 250: Performed by: STUDENT IN AN ORGANIZED HEALTH CARE EDUCATION/TRAINING PROGRAM

## 2023-12-31 PROCEDURE — 63600175 PHARM REV CODE 636 W HCPCS: Performed by: PHYSICIAN ASSISTANT

## 2023-12-31 PROCEDURE — 25000003 PHARM REV CODE 250: Performed by: NURSE PRACTITIONER

## 2023-12-31 PROCEDURE — 25000003 PHARM REV CODE 250: Performed by: HOSPITALIST

## 2023-12-31 PROCEDURE — 80076 HEPATIC FUNCTION PANEL: CPT | Performed by: INTERNAL MEDICINE

## 2023-12-31 PROCEDURE — 51798 US URINE CAPACITY MEASURE: CPT

## 2023-12-31 PROCEDURE — 86901 BLOOD TYPING SEROLOGIC RH(D): CPT | Performed by: NURSE PRACTITIONER

## 2023-12-31 PROCEDURE — 84100 ASSAY OF PHOSPHORUS: CPT | Performed by: INTERNAL MEDICINE

## 2023-12-31 PROCEDURE — 94668 MNPJ CHEST WALL SBSQ: CPT

## 2023-12-31 PROCEDURE — 94761 N-INVAS EAR/PLS OXIMETRY MLT: CPT

## 2023-12-31 PROCEDURE — 99233 SBSQ HOSP IP/OBS HIGH 50: CPT | Mod: ,,, | Performed by: NURSE PRACTITIONER

## 2023-12-31 RX ORDER — TRAZODONE HYDROCHLORIDE 50 MG/1
50 TABLET ORAL NIGHTLY
Status: DISCONTINUED | OUTPATIENT
Start: 2023-12-31 | End: 2024-01-13

## 2023-12-31 RX ORDER — POTASSIUM CHLORIDE 20 MEQ/1
40 TABLET, EXTENDED RELEASE ORAL ONCE
Status: COMPLETED | OUTPATIENT
Start: 2023-12-31 | End: 2023-12-31

## 2023-12-31 RX ORDER — INSULIN ASPART 100 [IU]/ML
0-5 INJECTION, SOLUTION INTRAVENOUS; SUBCUTANEOUS EVERY 4 HOURS PRN
Status: DISCONTINUED | OUTPATIENT
Start: 2023-12-31 | End: 2023-12-31

## 2023-12-31 RX ORDER — INSULIN ASPART 100 [IU]/ML
0-5 INJECTION, SOLUTION INTRAVENOUS; SUBCUTANEOUS EVERY 4 HOURS PRN
Status: DISCONTINUED | OUTPATIENT
Start: 2023-12-31 | End: 2024-01-13

## 2023-12-31 RX ADMIN — WARFARIN SODIUM 5 MG: 5 TABLET ORAL at 05:12

## 2023-12-31 RX ADMIN — FUROSEMIDE 80 MG: 10 INJECTION, SOLUTION INTRAVENOUS at 08:12

## 2023-12-31 RX ADMIN — GABAPENTIN 125 MG: 250 SOLUTION ORAL at 08:12

## 2023-12-31 RX ADMIN — TRAMADOL HYDROCHLORIDE 50 MG: 50 TABLET, COATED ORAL at 02:12

## 2023-12-31 RX ADMIN — INSULIN ASPART 4 UNITS: 100 INJECTION, SOLUTION INTRAVENOUS; SUBCUTANEOUS at 12:12

## 2023-12-31 RX ADMIN — INSULIN ASPART 4 UNITS: 100 INJECTION, SOLUTION INTRAVENOUS; SUBCUTANEOUS at 04:12

## 2023-12-31 RX ADMIN — INSULIN ASPART 4 UNITS: 100 INJECTION, SOLUTION INTRAVENOUS; SUBCUTANEOUS at 08:12

## 2023-12-31 RX ADMIN — TRAZODONE HYDROCHLORIDE 50 MG: 50 TABLET ORAL at 08:12

## 2023-12-31 RX ADMIN — INSULIN ASPART 4 UNITS: 100 INJECTION, SOLUTION INTRAVENOUS; SUBCUTANEOUS at 11:12

## 2023-12-31 RX ADMIN — ACETAMINOPHEN 1000 MG: 500 TABLET ORAL at 08:12

## 2023-12-31 RX ADMIN — FAMOTIDINE 20 MG: 20 TABLET, FILM COATED ORAL at 08:12

## 2023-12-31 RX ADMIN — AMIODARONE HYDROCHLORIDE 400 MG: 200 TABLET ORAL at 08:12

## 2023-12-31 RX ADMIN — ACETAMINOPHEN 1000 MG: 500 TABLET ORAL at 02:12

## 2023-12-31 RX ADMIN — Medication: at 08:12

## 2023-12-31 RX ADMIN — SENNOSIDES AND DOCUSATE SODIUM 2 TABLET: 8.6; 5 TABLET ORAL at 08:12

## 2023-12-31 RX ADMIN — POTASSIUM CHLORIDE 40 MEQ: 1500 TABLET, EXTENDED RELEASE ORAL at 06:12

## 2023-12-31 RX ADMIN — ONDANSETRON 4 MG: 2 INJECTION INTRAMUSCULAR; INTRAVENOUS at 02:12

## 2023-12-31 RX ADMIN — Medication: at 09:12

## 2023-12-31 RX ADMIN — VENLAFAXINE 37.5 MG: 37.5 TABLET ORAL at 08:12

## 2023-12-31 NOTE — NURSING
2100- Pt complain of a headache 9/10 pain rating. VS WDL. Parameters WDL. Blood sugar not hypoglycemic. Pt denies any visual changes or numbness or tingling. Pt and family states he never had a severe headache before. MD notified of findings. Orders placed for a CT of the head. Charge nurse notified.   2145 - Pt left the unit in bed with nurse and Charge nurse to go down to CT. Pt hooked up to portable monitor. Pt on batteries and emergency bag brought with patient.   2200 - Pt arrive back on unit in bed with nurse and Charge nurse. MD notified.

## 2023-12-31 NOTE — NURSING
Pt stated he was feeling like throwing up his tube feed again,abd soft to touch,denies abd pain/discomfort,prn zofran given at 1440.Tube feed held.MD Alonso,[facility] notified.KUB ordered.Ordered to restart tube feed at 1700 at 30 ml/hr.

## 2023-12-31 NOTE — ASSESSMENT & PLAN NOTE
-HeartMate 3 Implanted  12/1/2023  as DT  -HTS Primary  -Implanted by Dr. Washington  -Continue Coumadin, dosing by PharmD.  Goal INR 2.0-3.0 . Therapeutic today.   -Antiplatelets Not on ASA  -LDH is stable overall today. Will continue to monitor daily.  -Weaned off midodrine.  -Speed set at  4900   rpm, LSL 4500 rpm   -Interrogation notable for no events  -ECHO 12/11 AV does not open, IVS midline, LVEDD 6.1, with HM3 speed set to 4900 rpm. Will repeat this week.   -Not listed for OHTx, declined for OHTX due to comorbidities         Procedure: Device Interrogation Including analysis of device parameters  Current Settings: Ventricular Assist Device  Review of device function is stable/unstable stable        12/31/2023     4:00 AM 12/31/2023    12:06 AM 12/30/2023     8:36 PM 12/30/2023     4:30 PM 12/30/2023     1:05 PM 12/30/2023     8:37 AM 12/30/2023     4:08 AM   TXP LVAD INTERROGATIONS   Type HeartMate3 HeartMate3 HeartMate3 HeartMate3 HeartMate3 HeartMate3 HeartMate3   Flow 3.8 4.2 4.2 4 3.7 3.5 3.4   Speed 4900 4900 4900 4900 4900 4900 4900   PI 4.7 3.6 3.5 5 5.7 6.2 7.3   Power ([place]) 3.3 3.2 3.3 3.3 3.3 3.3 3.2   LSL 4500 4500 4500 4500 4500 4500 4500   Low Flow Alarm    no no no    High Power Alarm    no no no    Pulsatility Intermittent pulse Intermittent pulse Intermittent pulse Intermittent pulse Intermittent pulse Intermittent pulse Pulse

## 2023-12-31 NOTE — CARE UPDATE
-Glucose Goal 140-180    -A1C:   Hemoglobin A1C   Date Value Ref Range Status   10/12/2023 7.6 (H) 4.0 - 5.6 % Final     Comment:     ADA Screening Guidelines:  5.7-6.4%  Consistent with prediabetes  >or=6.5%  Consistent with diabetes    High levels of fetal hemoglobin interfere with the HbA1C  assay. Heterozygous hemoglobin variants (HbS, HgC, etc)do  not significantly interfere with this assay.   However, presence of multiple variants may affect accuracy.           -HOME REGIMEN: metformin     -GLUCOSE TREND FOR THE PAST 24HRS:   Recent Labs   Lab 12/30/23  1257 12/30/23  1632 12/30/23  2056 12/31/23  0013 12/31/23  0412 12/31/23  0836   POCTGLUCOSE 154* 106 154* 125* 129* 145*           -NO HYPOGYCEMIAS NOTED     - Diet  Diet NPO Except for: Medication (per NG tube)    -Steroids - none     -Tube Feeds - TF @ 45ml/hr - Currently being held.         Plan:   - Novolog 4 units q 4 hr to cover TF- hold if TF held or BG < 100   - LDC (150/50) prn q4hr   - POCT Glucose every 4 hours  - Hypoglycemia protocol in place      ** Please notify Endocrine for any change and/or advance in diet**  ** Please call Endocrine for any BG related issues **     Discharge Planning:   TBD. Please notify endocrinology prior to discharge.

## 2023-12-31 NOTE — NURSING
Walked in room with pt in trendelenburg position,tube feeding still running through NG tube.Pt educated not to lower his head below 30 degrees while tube feeding is still running, or to call nurse to come hold the tube feeding to prevent aspiration.Pt demonstrated understanding, but needs reinforcements.

## 2023-12-31 NOTE — ASSESSMENT & PLAN NOTE
-Psychiatry consulted for depressed mood, SI when left alone  -Recommend sitter when alone (possibly with transition to stepdown).  -Added venlafaxine 12/20

## 2023-12-31 NOTE — NURSING
Pt had 150 ml emesis, denies nausea,abd soft to touch with active bowel sounds.Pt had tube feeding running at 45 ml/hr.Erasto Pakr,NP,HTS notified.Instructed to hold tube feeding until noon.

## 2023-12-31 NOTE — PLAN OF CARE
Pt free of falls and injury. Pt AAOx4. Fall precautions remain in place. Sternal precautions maintained. Pt remains on room air. Sats 98-99%. NSR on telemetry with LVAD artifact. LVAD hum present and smooth. VAD numbers and dopplers WDL. DLES dressing CDI. Plan of care reviewed with pt.  Pt did complain of headache see previous note. Glucose monitored and scheduled insulin given. Tube feed continued as  ordered. Potassium replacement given. Pt denies chest pain and SOB. No acute distress noted,  plan of care continues.      Problem: Adult Inpatient Plan of Care  Goal: Plan of Care Review  Outcome: Ongoing, Progressing  Goal: Patient-Specific Goal (Individualized)  Outcome: Ongoing, Progressing  Goal: Absence of Hospital-Acquired Illness or Injury  Outcome: Ongoing, Progressing  Goal: Optimal Comfort and Wellbeing  Outcome: Ongoing, Progressing  Goal: Readiness for Transition of Care  Outcome: Ongoing, Progressing     Problem: Diabetes Comorbidity  Goal: Blood Glucose Level Within Targeted Range  Outcome: Ongoing, Progressing     Problem: Fluid and Electrolyte Imbalance (Acute Kidney Injury/Impairment)  Goal: Fluid and Electrolyte Balance  Outcome: Ongoing, Progressing     Problem: Oral Intake Inadequate (Acute Kidney Injury/Impairment)  Goal: Optimal Nutrition Intake  Outcome: Ongoing, Progressing     Problem: Renal Function Impairment (Acute Kidney Injury/Impairment)  Goal: Effective Renal Function  Outcome: Ongoing, Progressing     Problem: Infection  Goal: Absence of Infection Signs and Symptoms  Outcome: Ongoing, Progressing     Problem: Adjustment to Illness (Heart Failure)  Goal: Optimal Coping  Outcome: Ongoing, Progressing     Problem: Cardiac Output Decreased (Heart Failure)  Goal: Optimal Cardiac Output  Outcome: Ongoing, Progressing     Problem: Dysrhythmia (Heart Failure)  Goal: Stable Heart Rate and Rhythm  Outcome: Ongoing, Progressing     Problem: Fluid Imbalance (Heart Failure)  Goal: Fluid  Balance  Outcome: Ongoing, Progressing     Problem: Functional Ability Impaired (Heart Failure)  Goal: Optimal Functional Ability  Outcome: Ongoing, Progressing     Problem: Oral Intake Inadequate (Heart Failure)  Goal: Optimal Nutrition Intake  Outcome: Ongoing, Progressing     Problem: Respiratory Compromise (Heart Failure)  Goal: Effective Oxygenation and Ventilation  Outcome: Ongoing, Progressing     Problem: Sleep Disordered Breathing (Heart Failure)  Goal: Effective Breathing Pattern During Sleep  Outcome: Ongoing, Progressing     Problem: Cardiac Output Decreased  Goal: Effective Cardiac Output  Outcome: Ongoing, Progressing     Problem: Fall Injury Risk  Goal: Absence of Fall and Fall-Related Injury  Outcome: Ongoing, Progressing     Problem: Coping Ineffective  Goal: Effective Coping  Outcome: Ongoing, Progressing     Problem: Skin Injury Risk Increased  Goal: Skin Health and Integrity  Outcome: Ongoing, Progressing     Problem: Adjustment to Device (Ventricular Assist Device)  Goal: Optimal Adjustment to Device  Outcome: Ongoing, Progressing     Problem: Bleeding (Ventricular Assist Device)  Goal: Absence of Bleeding  Outcome: Ongoing, Progressing     Problem: Embolism (Ventricular Assist Device)  Goal: Absence of Embolism Signs and Symptoms  Outcome: Ongoing, Progressing     Problem: Hemodynamic Instability (Ventricular Assist Device)  Goal: Optimal Blood Flow  Outcome: Ongoing, Progressing     Problem: Infection (Ventricular Assist Device)  Goal: Absence of Infection Signs and Symptoms  Outcome: Ongoing, Progressing     Problem: Right-Sided Heart Compromise (Ventricular Assist Device)  Goal: Effective Right-Sided Heart Function  Outcome: Ongoing, Progressing     Problem: Device-Related Complication Risk (CRRT (Continuous Renal Replacement Therapy))  Goal: Safe, Effective Therapy Delivery  Outcome: Ongoing, Progressing     Problem: Hypothermia (CRRT (Continuous Renal Replacement Therapy))  Goal: Body  Temperature Maintained in Desired Range  Outcome: Ongoing, Progressing     Problem: Infection (CRRT (Continuous Renal Replacement Therapy))  Goal: Absence of Infection Signs and Symptoms  Outcome: Ongoing, Progressing     Problem: Skin and Tissue Injury (Artificial Airway)  Goal: Absence of Device-Related Skin or Tissue Injury  Outcome: Ongoing, Progressing     Problem: Adjustment to Illness (Sepsis/Septic Shock)  Goal: Optimal Coping  Outcome: Ongoing, Progressing     Problem: Bleeding (Sepsis/Septic Shock)  Goal: Absence of Bleeding  Outcome: Ongoing, Progressing     Problem: Glycemic Control Impaired (Sepsis/Septic Shock)  Goal: Blood Glucose Level Within Desired Range  Outcome: Ongoing, Progressing     Problem: Infection Progression (Sepsis/Septic Shock)  Goal: Absence of Infection Signs and Symptoms  Outcome: Ongoing, Progressing     Problem: Nutrition Impaired (Sepsis/Septic Shock)  Goal: Optimal Nutrition Intake  Outcome: Ongoing, Progressing

## 2023-12-31 NOTE — SUBJECTIVE & OBJECTIVE
Interval History: No complaints or overnight events. Did not sleep well at all last night. Weaned off midodrine and tolerated HD yesterday without incident.     Continuous Infusions:   sodium chloride 0.9%      dextrose 10 % in water (D10W)       Scheduled Meds:   acetaminophen  1,000 mg Per NG tube TID    amiodarone  400 mg Per NG tube Daily    balsam peru-castor oiL   Topical (Top) BID    famotidine  20 mg Per G Tube Daily    furosemide (LASIX) injection  80 mg Intravenous BID    gabapentin  125 mg Per NG tube Q12H    insulin aspart U-100  4 Units Subcutaneous 6 times per day    polyethylene glycol  17 g Per NG tube Daily    psyllium husk (aspartame)  1 packet Per NG tube Daily    senna-docusate 8.6-50 mg  2 tablet Per NG tube Daily    venlafaxine  37.5 mg Per G Tube Daily    [START ON 1/1/2024] warfarin  2.5 mg Per G Tube Every Mon, Wed, Fri    warfarin  5 mg Per G Tube Every Tues, Thurs, Sat, Sun     PRN Meds:bisacodyL, dextrose 10 % in water (D10W), dextrose 10%, dextrose 10%, glucagon (human recombinant), heparin (porcine), hydrALAZINE, insulin aspart U-100, ondansetron, Flushing PICC/Midline Protocol **AND** [DISCONTINUED] sodium chloride 0.9% **AND** sodium chloride 0.9%, traMADoL, zolpidem    Review of patient's allergies indicates:  No Known Allergies  Objective:     Vital Signs (Most Recent):  Temp: 98.3 °F (36.8 °C) (12/31/23 0756)  Pulse: 93 (12/31/23 0600)  Resp: 18 (12/30/23 2324)  BP: (!) 80/0 (12/31/23 0407)  SpO2: 97 % (12/31/23 0756) Vital Signs (24h Range):  Temp:  [97 °F (36.1 °C)-99.8 °F (37.7 °C)] 98.3 °F (36.8 °C)  Pulse:  [88-96] 93  Resp:  [15-18] 18  SpO2:  [95 %-100 %] 97 %  BP: (65-89)/(0-70) 80/0     No data found.  Body mass index is 24.55 kg/m².      Intake/Output Summary (Last 24 hours) at 12/31/2023 0838  Last data filed at 12/31/2023 0728  Gross per 24 hour   Intake 1990 ml   Output 1800 ml   Net 190 ml         Hemodynamic Parameters:  CVP:  [5 mmHg] 5 mmHg    Telemetry: SR        Physical Exam  Constitutional:       Comments: Cachectic, temporal wasting   HENT:      Head: Normocephalic and atraumatic.   Eyes:      Conjunctiva/sclera: Conjunctivae normal.      Pupils: Pupils are equal, round, and reactive to light.   Neck:      Comments: Do not appreciate elevated JVP  Cardiovascular:      Rate and Rhythm: Normal rate and regular rhythm.      Comments: Smooth VAD hum  Pulmonary:      Effort: Pulmonary effort is normal.      Breath sounds: Normal breath sounds.   Abdominal:      General: Bowel sounds are normal.      Palpations: Abdomen is soft.   Musculoskeletal:         General: No swelling. Normal range of motion.      Cervical back: Normal range of motion and neck supple.   Skin:     General: Skin is warm and dry.      Capillary Refill: Capillary refill takes 2 to 3 seconds.   Neurological:      General: No focal deficit present.      Mental Status: He is alert and oriented to person, place, and time.            Significant Labs:  CBC:  Recent Labs   Lab 12/29/23 0406 12/30/23 0439 12/31/23 0426   WBC 8.87 8.98 7.92   RBC 2.41* 2.41* 2.44*   HGB 6.9* 6.9* 7.0*   HCT 22.4* 22.6* 22.4*   * 486* 452*   MCV 93 94 92   MCH 28.6 28.6 28.7   MCHC 30.8* 30.5* 31.3*       BNP:  Recent Labs   Lab 12/25/23  0503 12/27/23  0450 12/29/23 0406   BNP 2,604* 1,779* 2,177*       CMP:  Recent Labs   Lab 12/29/23  0406 12/30/23 0439 12/31/23  0426   * 158* 126*   CALCIUM 9.4 9.1 8.0*   ALBUMIN 1.9* 1.9* 1.9*   PROT 6.7 6.7 6.7    133* 132*   K 4.2 4.1 3.4*   CO2 23 21* 27    102 98   BUN 38* 65* 36*   CREATININE 3.3* 4.7* 3.1*   ALKPHOS 166* 178* 179*   ALT 13 12 13   AST 38 35 37   BILITOT 0.4 0.4 0.4        Coagulation:   Recent Labs   Lab 12/29/23  0406 12/30/23  0439 12/31/23  0426   INR 2.9* 2.5* 2.4*       LDH:  Recent Labs   Lab 12/29/23  0406 12/30/23  0439 12/31/23  0426   * 324* 369*       Microbiology:  Microbiology Results (last 7 days)       ** No results  found for the last 168 hours. **            I have reviewed all pertinent labs within the past 24 hours.    Estimated Creatinine Clearance: 27.5 mL/min (A) (based on SCr of 3.1 mg/dL (H)).    Diagnostic Results:  I have reviewed and interpreted all pertinent imaging results/findings within the past 24 hours.

## 2023-12-31 NOTE — PT/OT/SLP PROGRESS
Physical Therapy Treatment    Patient Name:  Radha Abbott   MRN:  [id number]  Admitting Diagnosis:  LVAD (left ventricular assist device) present   Recent Surgery: Procedure(s) (LRB):  CLOSURE, WOUND, STERNUM (N/A)  INSERTION, GRAFT, PERICARDIUM  DRAINAGE, PLEURAL EFFUSION 27 Days Post-Op  Admit Date: 11/9/2023  Length of Stay: 52 days    Recommendations:     Discharge Recommendations:    High Intensity Therapy  Discharge Equipment Recommendations: other (see comments) (continue to assess DME)   Barriers to discharge:  increased need for assistance    Appropriate transfer level with nursing staff: 3 person t/f to cardiac chair    Plan:     During this hospitalization, patient to be seen 6 x/week to address the identified rehab impairments via gait training, therapeutic activities, therapeutic exercises, neuromuscular re-education and progress towards the established goals.  Plan of Care Expires:  01/04/24  Plan of Care Reviewed with: patient, spouse    Assessment:     Radha Abbott is a 63 y.o. male admitted with a medical diagnosis of LVAD (left ventricular assist device) present. Pt agreeable to therapy session. Pt limited by R foot pain during gait trial today limiting gait distance. Patient continues to require increased assistance with functional mobility at this time. Patient would benefit from skilled therapy services to maximize safety and independence, increase activity tolerance, decrease fall risk, decrease caregiver burden, improve QOL, improve patient's functional mobility, and decrease risk of contractures and pressure sores. Patient has demonstrated sufficient progression to warrant high intensity therapy evidenced by objectives noted below.    Problem List: weakness, impaired endurance, impaired self care skills, impaired functional mobility, gait instability, impaired balance, decreased upper extremity function, decreased lower extremity function, decreased safety awareness, pain, impaired  "cardiopulmonary response to activity.  Rehab Prognosis: Good; patient would benefit from acute skilled PT services to address these deficits and reach maximum level of function.      Goals:   Multidisciplinary Problems       Physical Therapy Goals          Problem: Physical Therapy    Goal Priority Disciplines Outcome Goal Variances Interventions   Physical Therapy Goal     PT, PT/OT Ongoing, Progressing     Description: Goals to be met by: 23     Patient will increase functional independence with mobility by performin. Sit to stand transfer with modified independence  2. Bed to chair transfer with supervision  3. Gait  x 150 feet with supervision using physician approved AD with standing rest breaks prn.   4. Lower extremity exercise program x30 reps per handout, with independence  5. Recite 3/3 sternal precautions and remain complaint with precautions throughout session with no verbal cues                     Multidisciplinary Problems (Resolved)          Problem: Physical Therapy    Goal Priority Disciplines Outcome Goal Variances Interventions   Physical Therapy Goal   (Resolved)     PT, PT/OT Met     Description: Goals to be met by: 23     Patient will increase functional independence with mobility by performin. Evaluate pt's need for physical therapy.                          Subjective     RN notified prior to session. Family present upon PT entrance into room. Patient agreeable to PT treatment session.    Chief Complaint: "I can't walk anymore my foot hurts so bad"  Patient/Family Comments/goals: increase strength and mobility   Pain/Comfort:  Pain Rating 1: 9/10  Location - Side 1: Right  Location - Orientation 1: generalized  Location 1: foot  Pain Addressed 1: Reposition, Distraction  Pain Rating Post-Intervention 1: 9/10      Objective:     Additional staff present: Rehab Tech (Kobe)    Patient found supine with: telemetry, NG tube, LVAD   Cognition:   Alert and " Cooperative  Patient is oriented to Person, Place, Time, Situation  General Precautions: Standard, Cardiac LVAD, fall, sternal   Orthopedic Precautions:N/A   Braces: N/A   Body mass index is 24.55 kg/m².  Oxygen Device: Room Air  Vitals: BP (!) 84/0 (BP Location: Left arm, Patient Position: Lying)   Pulse 92   Temp 97.6 °F (36.4 °C) (Oral)   Resp 18   Ht 6' (1.829 m)   Wt 82.1 kg (181 lb)   SpO2 95%   BMI 24.55 kg/m²     Outcome Measures:  AM-PAC 6 CLICK MOBILITY  Turning over in bed (including adjusting bedclothes, sheets and blankets)?: 2  Sitting down on and standing up from a chair with arms (e.g., wheelchair, bedside commode, etc.): 2  Moving from lying on back to sitting on the side of the bed?: 2  Moving to and from a bed to a chair (including a wheelchair)?: 2  Need to walk in hospital room?: 2  Climbing 3-5 steps with a railing?: 1  Basic Mobility Total Score: 11     Functional Mobility:    Bed Mobility:   Scooting with minimum assistance  Supine > Sit with moderate assistance    Transfers:   Sit <> Stand Transfer: moderate assistance and of 2 persons with  platform RW x2 trials from chair     Bed <> Chair Transfer: Squat Pivot technique with maximal assistance and of 2 persons with no assistive device    Balance:  Sitting Balance  Static: stand by assistance  Dynamic: stand by assistance  Standing:  Static: moderate assistance and of 2 persons  Dynamic: moderate assistance and of 2 persons      Gait:  Patient ambulated: 5 steps fwd (further mobility limited by pain)   Patient required: moderate assist  Patient used:  platform walker   Gait Deviation(s): unsteady gait, decreased step length, narrow base of support, decreased weight shift, decreased foot clearance, flexed posture, and decreased gina  all lines remained intact throughout ambulation trial  Chair follow for patient safety  LVAD: Consolidation bag utilized   LVAD: Emergency bag present throughout ambulation trial out of room  Comments:  Patient required tactile cueing for glut extension and thoracic extension, but pt still with fwd lean during trial. Pt requesting to sit 2/2 R foot pain.       Education:  Time provided for education, counseling and discussion of health disposition in regards to patient's current status  All questions answered within PT scope of practice and to patient's satisfaction  PT role in POC to address current functional deficits  Pt educated on proper body mechanics, safety techniques, and energy conservation with PT facilitation and cueing throughout session  LVAD: patient found on wall power / returned on battery power. No alarms sounded.   LVAD: Pt able to  switch wall <> battery source with minimum assistance from therapist. (Assistance with unscrewing power cables and untangling power cables to put into consolidation bag)    Patient left  up in cardiac chair  with all lines intact, call button in reach, PCT notified, and family present.    Time Tracking:     PT Received On: 12/31/23  PT Start Time: 1328     PT Stop Time: 1404  PT Total Time (min): 36 min     Billable Minutes:   Gait Training 10 minutes and Therapeutic Activity 20 minutes    Treatment Type: Treatment  PT/PTA: PT       12/31/2023

## 2023-12-31 NOTE — PROGRESS NOTES
12/30/2023  Deni Frye    Current provider:  Brayan Ocampo MD    Device interrogation:      12/30/2023     8:36 PM 12/30/2023     4:30 PM 12/30/2023     1:05 PM 12/30/2023     8:37 AM 12/30/2023     4:08 AM 12/29/2023    11:53 PM 12/29/2023     8:09 PM   TXP LVAD INTERROGATIONS   Type HeartMate3 HeartMate3 HeartMate3 HeartMate3 HeartMate3 HeartMate3 HeartMate3   Flow 4.2 4 3.7 3.5 3.4 3.6 3.6   Speed 4900 4900 4900 4900 4900 4900 4900   PI 3.5 5 5.7 6.2 7.3 6.8 5.5   Power ([place]) 3.3 3.3 3.3 3.3 3.2 3.3 3.3   LSL 4500 4500 4500 4500 4500 4500 4500   Low Flow Alarm  no no no      High Power Alarm  no no no      Pulsatility Intermittent pulse Intermittent pulse Intermittent pulse Intermittent pulse Pulse Pulse Pulse          Rounded on [facility] to ensure all mechanical assist device settings (IABP or VAD) were appropriate and all parameters were within limits.  I was able to ensure all back up equipment was present, the staff had no issues, and the Perfusion Department daily rounding was complete.      For implantable VADs: Interrogation of Ventricular assist device was performed with analysis of device parameters and review of device function. I have personally reviewed the interrogation findings and agree with findings as stated.     In emergency, the nursing units have been notified to contact the perfusion department either by:  Calling [id number] from 630am to 4pm Mon thru Fri, utilizing the On-Call Finder functionality of Epic and searching for Perfusion, or by contacting the hospital  from 4pm to 630am and on weekends and asking to speak with the perfusionist on call.    10:47 PM

## 2023-12-31 NOTE — PLAN OF CARE
AAOX4,VSS,O2 sats>94% on RA.Patient rolling with assistx1,fall precautions in place.LVAD DP and numbers WNL, smooth LVAD hum.Pt educated on importance of engaging him,self on VAD activities and mobility,pt needs reinforcement.Patient has no complaints of chest pain/SOB/HA.Discussed medications and care.Patient has no questions at this time.Pt visualised and stable with no acute distress.Pt resting well,call light within reach, bed at the lowest position.Family by bedside.    Problem: Adult Inpatient Plan of Care  Goal: Plan of Care Review  Outcome: Ongoing, Progressing  Goal: Patient-Specific Goal (Individualized)  Outcome: Ongoing, Progressing  Goal: Absence of Hospital-Acquired Illness or Injury  Outcome: Ongoing, Progressing  Goal: Optimal Comfort and Wellbeing  Outcome: Ongoing, Progressing  Goal: Readiness for Transition of Care  Outcome: Ongoing, Progressing     Problem: Diabetes Comorbidity  Goal: Blood Glucose Level Within Targeted Range  Outcome: Ongoing, Progressing     Problem: Fluid and Electrolyte Imbalance (Acute Kidney Injury/Impairment)  Goal: Fluid and Electrolyte Balance  Outcome: Ongoing, Progressing     Problem: Oral Intake Inadequate (Acute Kidney Injury/Impairment)  Goal: Optimal Nutrition Intake  Outcome: Ongoing, Progressing     Problem: Renal Function Impairment (Acute Kidney Injury/Impairment)  Goal: Effective Renal Function  Outcome: Ongoing, Progressing     Problem: Infection  Goal: Absence of Infection Signs and Symptoms  Outcome: Ongoing, Progressing     Problem: Adjustment to Illness (Heart Failure)  Goal: Optimal Coping  Outcome: Ongoing, Progressing     Problem: Cardiac Output Decreased (Heart Failure)  Goal: Optimal Cardiac Output  Outcome: Ongoing, Progressing     Problem: Fluid Imbalance (Heart Failure)  Goal: Fluid Balance  Outcome: Ongoing, Progressing     Problem: Functional Ability Impaired (Heart Failure)  Goal: Optimal Functional Ability  Outcome: Ongoing, Progressing      Problem: Oral Intake Inadequate (Heart Failure)  Goal: Optimal Nutrition Intake  Outcome: Ongoing, Progressing     Problem: Fall Injury Risk  Goal: Absence of Fall and Fall-Related Injury  Outcome: Ongoing, Progressing     Problem: Coping Ineffective  Goal: Effective Coping  Outcome: Ongoing, Progressing     Problem: Skin Injury Risk Increased  Goal: Skin Health and Integrity  Outcome: Ongoing, Progressing     Problem: Adjustment to Device (Ventricular Assist Device)  Goal: Optimal Adjustment to Device  Outcome: Ongoing, Progressing     Problem: Embolism (Ventricular Assist Device)  Goal: Absence of Embolism Signs and Symptoms  Outcome: Ongoing, Progressing     Problem: Glycemic Control Impaired (Sepsis/Septic Shock)  Goal: Blood Glucose Level Within Desired Range  Outcome: Ongoing, Progressing     Problem: Bleeding (Sepsis/Septic Shock)  Goal: Absence of Bleeding  Outcome: Ongoing, Progressing     Problem: Nutrition Impaired (Sepsis/Septic Shock)  Goal: Optimal Nutrition Intake  Outcome: Ongoing, Progressing

## 2023-12-31 NOTE — PROGRESS NOTES
[facility] - [facility]iology Step[facility]  Heart Transplant  Progress Note    Patient Name: Radha Abbott  MRN: [id number]  Admission Date: 11/9/2023  Hospital Length of Stay: 52 days  Attending Physician: Brayan Ocampo MD  Primary Care Provider: Vasu Kong MD  Principal Problem:LVAD (left ventricular assist device) present    Subjective:   Interval History: No complaints or overnight events. Did not sleep well at all last night. Weaned off midodrine and tolerated HD yesterday without incident.     Continuous Infusions:   sodium chloride 0.9%      dextrose 10 % in water (D10W)       Scheduled Meds:   acetaminophen  1,000 mg Per NG tube TID    amiodarone  400 mg Per NG tube Daily    balsam peru-castor oiL   Topical (Top) BID    famotidine  20 mg Per G Tube Daily    furosemide (LASIX) injection  80 mg Intravenous BID    gabapentin  125 mg Per NG tube Q12H    insulin aspart U-100  4 Units Subcutaneous 6 times per day    polyethylene glycol  17 g Per NG tube Daily    psyllium husk (aspartame)  1 packet Per NG tube Daily    senna-docusate 8.6-50 mg  2 tablet Per NG tube Daily    venlafaxine  37.5 mg Per G Tube Daily    [START ON 1/1/2024] warfarin  2.5 mg Per G Tube Every Mon, Wed, Fri    warfarin  5 mg Per G Tube Every Tues, Thurs, Sat, Sun     PRN Meds:bisacodyL, dextrose 10 % in water (D10W), dextrose 10%, dextrose 10%, glucagon (human recombinant), heparin (porcine), hydrALAZINE, insulin aspart U-100, ondansetron, Flushing PICC/Midline Protocol **AND** [DISCONTINUED] sodium chloride 0.9% **AND** sodium chloride 0.9%, traMADoL, zolpidem    Review of patient's allergies indicates:  No Known Allergies  Objective:     Vital Signs (Most Recent):  Temp: 98.3 °F (36.8 °C) (12/31/23 0756)  Pulse: 93 (12/31/23 0600)  Resp: 18 (12/30/23 2324)  BP: (!) 80/0 (12/31/23 0407)  SpO2: 97 % (12/31/23 0756) Vital Signs (24h Range):  Temp:  [97 °F (36.1 °C)-99.8 °F (37.7 °C)] 98.3 °F (36.8 °C)  Pulse:  [88-96] 93  Resp:  [15-18]  18  SpO2:  [95 %-100 %] 97 %  BP: (65-89)/(0-70) 80/0     No data found.  Body mass index is 24.55 kg/m².      Intake/Output Summary (Last 24 hours) at 12/31/2023 0838  Last data filed at 12/31/2023 0728  Gross per 24 hour   Intake 1990 ml   Output 1800 ml   Net 190 ml         Hemodynamic Parameters:  CVP:  [5 mmHg] 5 mmHg    Telemetry: SR       Physical Exam  Constitutional:       Comments: Cachectic, temporal wasting   HENT:      Head: Normocephalic and atraumatic.   Eyes:      Conjunctiva/sclera: Conjunctivae normal.      Pupils: Pupils are equal, round, and reactive to light.   Neck:      Comments: Do not appreciate elevated JVP  Cardiovascular:      Rate and Rhythm: Normal rate and regular rhythm.      Comments: Smooth VAD hum  Pulmonary:      Effort: Pulmonary effort is normal.      Breath sounds: Normal breath sounds.   Abdominal:      General: Bowel sounds are normal.      Palpations: Abdomen is soft.   Musculoskeletal:         General: No swelling. Normal range of motion.      Cervical back: Normal range of motion and neck supple.   Skin:     General: Skin is warm and dry.      Capillary Refill: Capillary refill takes 2 to 3 seconds.   Neurological:      General: No focal deficit present.      Mental Status: He is alert and oriented to person, place, and time.            Significant Labs:  CBC:  Recent Labs   Lab 12/29/23  0406 12/30/23 0439 12/31/23 0426   WBC 8.87 8.98 7.92   RBC 2.41* 2.41* 2.44*   HGB 6.9* 6.9* 7.0*   HCT 22.4* 22.6* 22.4*   * 486* 452*   MCV 93 94 92   MCH 28.6 28.6 28.7   MCHC 30.8* 30.5* 31.3*       BNP:  Recent Labs   Lab 12/25/23  0503 12/27/23  0450 12/29/23 0406   BNP 2,604* 1,779* 2,177*       CMP:  Recent Labs   Lab 12/29/23  0406 12/30/23  0439 12/31/23 0426   * 158* 126*   CALCIUM 9.4 9.1 8.0*   ALBUMIN 1.9* 1.9* 1.9*   PROT 6.7 6.7 6.7    133* 132*   K 4.2 4.1 3.4*   CO2 23 21* 27    102 98   BUN 38* 65* 36*   CREATININE 3.3* 4.7* 3.1*   ALKPHOS  166* 178* 179*   ALT 13 12 13   AST 38 35 37   BILITOT 0.4 0.4 0.4        Coagulation:   Recent Labs   Lab 12/29/23  0406 12/30/23  0439 12/31/23  0426   INR 2.9* 2.5* 2.4*       LDH:  Recent Labs   Lab 12/29/23  0406 12/30/23  0439 12/31/23  0426   * 324* 369*       Microbiology:  Microbiology Results (last 7 days)       ** No results found for the last 168 hours. **            I have reviewed all pertinent labs within the past 24 hours.    Estimated Creatinine Clearance: 27.5 mL/min (A) (based on SCr of 3.1 mg/dL (H)).    Diagnostic Results:  I have reviewed and interpreted all pertinent imaging results/findings within the past 24 hours.  Assessment and Plan:     63 year old male with hx of CAD s/p 3v CABG (unclear anatomy, 2009), ICM with a recent EF of 15-20% s/p ICD (medtronik 2009), DM2 (a1c 7.7), HTN, HLD, Vfib on amio who presents to the ED with CC of SOB     [place] [facility] [place].  He was started on a Lasix gtt and did well.  Started on gDMT and discharged home on  5 with plans to follow up in HTS clinic for ongoing transplant evaluation at another facility.  He says that about a few days ago he started to notice LE swelling.  This turned into Nelson and orthopnea.  He says he can't walk to the bathroom without getting SOB.  He also complains of weight gain.  He takes torsemide 20mg BID at home and was told to trial additional Lasix which he did without any improvement.  He was rx metolazone but has not been filled.  He came to the ED     In the ED he was AF with stable VS on RA.  CBC showing chronic anemia.  CMP notable for acute on chronic CKD with baseline around 2.1 and Cr now 2.6.  BNP elevated.  Lactate neg.  CXR showing pulmonary edema.  I evaluated the pt at the bedside.  Bedside TTE showing CVP >15.  He was subsequently admitted to the CCU for diuresis.     He was continued on his home  and was started on a lasix gtt, which he responded well to overnight (net  -1700cc. He feels much better this morning as well. Our transplant coordinators have been working on insurance approval and he is now being transferred to [facility] service for transplant evaluation.     * LVAD (left ventricular assist device) present  -HeartMate 3 Implanted  12/1/2023  as DT  -HTS Primary  -Implanted by Dr. Washington  -Continue Coumadin, dosing by PharmD.  Goal INR 2.0-3.0 . Therapeutic today.   -Antiplatelets Not on ASA  -LDH is stable overall today. Will continue to monitor daily.  -Weaned off midodrine.  -Speed set at  4900   rpm, LSL 4500 rpm   -Interrogation notable for no events  -ECHO 12/11 AV does not open, IVS midline, LVEDD 6.1, with HM3 speed set to 4900 rpm. Will repeat this week.   -Not listed for OHTx, declined for OHTX due to comorbidities         Procedure: Device Interrogation Including analysis of device parameters  Current Settings: Ventricular Assist Device  Review of device function is stable/unstable stable        12/31/2023     4:00 AM 12/31/2023    12:06 AM 12/30/2023     8:36 PM 12/30/2023     4:30 PM 12/30/2023     1:05 PM 12/30/2023     8:37 AM 12/30/2023     4:08 AM   TXP LVAD INTERROGATIONS   Type HeartMate3 HeartMate3 HeartMate3 HeartMate3 HeartMate3 HeartMate3 HeartMate3   Flow 3.8 4.2 4.2 4 3.7 3.5 3.4   Speed 4900 4900 4900 4900 4900 4900 4900   PI 4.7 3.6 3.5 5 5.7 6.2 7.3   Power (Carrington) 3.3 3.2 3.3 3.3 3.3 3.3 3.2   LSL 4500 4500 4500 4500 4500 4500 4500   Low Flow Alarm    no no no    High Power Alarm    no no no    Pulsatility Intermittent pulse Intermittent pulse Intermittent pulse Intermittent pulse Intermittent pulse Intermittent pulse Pulse         Unilateral complete vocal fold paralysis  -Appreciate ENT's help  -S/P augmentation of paralyzed left vocal cord 12/18    Dysphonia  -Speech following closely  -Appreciate ENT's help. S/P augmentation of paralyzed left vocal cord 12/18    Moderate malnutrition  -RD and SLP following  -Tolerating tube feeds. Total daily  caloric intake increased to 2160  -Failed MBBS 12/20, continue TF    Depressed mood  -Psychiatry consulted for depressed mood, SI when left alone  -Recommend sitter when alone (possibly with transition to stepdown).  -Added venlafaxine 12/20         IVÁN (acute kidney injury)  -Nephrology following      Acute on chronic combined systolic and diastolic CHF, NYHA class 4  Pt with known ICM with an EF of 25% on home  presented with decompensated HF.      -RHC 11/14 RA 14, PA 70/35, PCWP 32, CO 4.1, CI 2.1  -Repeat RHC 11/20 RA 20, PA 60/29 (39), PCWP 29, CO 4.6, CI 2.3  -ECHO 11/21 showed EF 15-20%, mild RV dysfunction, mild MR, LVED 6.9   -Home  5 has been weaned off  -Home GDMT: Entreso 24/26 BID (on hold 2/2 IVÁN), hydralazine 25 q 8hr, all held prior to LVAD implant  -Home diuretic: Lasix 40 mg BID   -IABP inserted 11/21 and underwent DT HM3 implant 12/1, chest closed 12/4  -ECHO 12/11 AV does not open, IVS midline, LVEDD 6.1, with HM3 speed set to 4900 rpm  -HD Tu, Thurs, Sat.   -No significant hypotension. Will wean midodrine off.       PAF (paroxysmal atrial fibrillation)  Known hx of pAF. In sinus rhythm on admission, but 1 run of AF RVR overnight. He spontaneously converted.  - Continue home amiodarone 400mg qd  - Continue AC      Acute renal failure with acute tubular necrosis superimposed on stage 3b chronic kidney disease  IVÁN on CKD2. Baseline Cr of 1.7-2.0.   - Hold ARNi  -Trend BMPs daily   -Nephrology following   -SLED/SCUF for volume removal began on 12/1. He is anuric  -Did not respond to diuretic challenge with 240 mg IV Lasix, 1000 mg Diuril, and 500 mg IV Diamox done 12/14  -Now on HD  -Tunneled trialysis line placed 12/22 by IR    Type 2 diabetes mellitus without complication, without long-term current use of insulin  -A1c 7.6, not insulin dependent  - Endocrine following, appreciate assistance with blood glucose management    CAD (coronary artery disease)  -S/p 3vCABG in 2009  -Lipitor on  hold 2/2 mild transaminitis  -Not on ASA post VAD    Ventricular tachycardia  Hx VT s/p ICD placement (medtronic 2009)  - Continue home amio 400mg qd        Erasto Park, NP  Heart Transplant  [facility] - Cardiology Stepdown

## 2024-01-01 LAB
ALBUMIN SERPL BCP-MCNC: 1.9 G/DL (ref 3.5–5.2)
ALP SERPL-CCNC: 159 U/L (ref 55–135)
ALT SERPL W/O P-5'-P-CCNC: 17 U/L (ref 10–44)
ANION GAP SERPL CALC-SCNC: 11 MMOL/L (ref 8–16)
AST SERPL-CCNC: 36 U/L (ref 10–40)
BASOPHILS # BLD AUTO: 0.05 K/UL (ref 0–0.2)
BASOPHILS NFR BLD: 0.7 % (ref 0–1.9)
BILIRUB DIRECT SERPL-MCNC: 0.2 MG/DL (ref 0.1–0.3)
BILIRUB SERPL-MCNC: 0.4 MG/DL (ref 0.1–1)
BNP SERPL-MCNC: 794 PG/ML (ref 0–99)
BUN SERPL-MCNC: 52 MG/DL (ref 8–23)
CA-I BLDV-SCNC: 1.09 MMOL/L (ref 1.06–1.42)
CALCIUM SERPL-MCNC: 8.3 MG/DL (ref 8.7–10.5)
CHLORIDE SERPL-SCNC: 97 MMOL/L (ref 95–110)
CO2 SERPL-SCNC: 25 MMOL/L (ref 23–29)
CREAT SERPL-MCNC: 4.5 MG/DL (ref 0.5–1.4)
CRP SERPL-MCNC: 88.7 MG/L (ref 0–8.2)
DIFFERENTIAL METHOD BLD: ABNORMAL
EOSINOPHIL # BLD AUTO: 0.1 K/UL (ref 0–0.5)
EOSINOPHIL NFR BLD: 1.5 % (ref 0–8)
ERYTHROCYTE [DISTWIDTH] IN BLOOD BY AUTOMATED COUNT: 16.5 % (ref 11.5–14.5)
EST. GFR  (NO RACE VARIABLE): 14.1 ML/MIN/1.73 M^2
GLUCOSE SERPL-MCNC: 152 MG/DL (ref 70–110)
HCT VFR BLD AUTO: 21.8 % (ref 40–54)
HGB BLD-MCNC: 6.9 G/DL (ref 14–18)
IMM GRANULOCYTES # BLD AUTO: 0.02 K/UL (ref 0–0.04)
IMM GRANULOCYTES NFR BLD AUTO: 0.3 % (ref 0–0.5)
INR PPP: 2.1 (ref 0.8–1.2)
LDH SERPL L TO P-CCNC: 353 U/L (ref 110–260)
LYMPHOCYTES # BLD AUTO: 0.7 K/UL (ref 1–4.8)
LYMPHOCYTES NFR BLD: 9.5 % (ref 18–48)
MAGNESIUM SERPL-MCNC: 2 MG/DL (ref 1.6–2.6)
MCH RBC QN AUTO: 28.5 PG (ref 27–31)
MCHC RBC AUTO-ENTMCNC: 31.7 G/DL (ref 32–36)
MCV RBC AUTO: 90 FL (ref 82–98)
MONOCYTES # BLD AUTO: 0.6 K/UL (ref 0.3–1)
MONOCYTES NFR BLD: 7.8 % (ref 4–15)
NEUTROPHILS # BLD AUTO: 5.9 K/UL (ref 1.8–7.7)
NEUTROPHILS NFR BLD: 80.2 % (ref 38–73)
NRBC BLD-RTO: 0 /100 WBC
PHOSPHATE SERPL-MCNC: 2.8 MG/DL (ref 2.7–4.5)
PLATELET # BLD AUTO: 409 K/UL (ref 150–450)
PMV BLD AUTO: 9.4 FL (ref 9.2–12.9)
POCT GLUCOSE: 105 MG/DL (ref 70–110)
POCT GLUCOSE: 132 MG/DL (ref 70–110)
POCT GLUCOSE: 156 MG/DL (ref 70–110)
POCT GLUCOSE: 157 MG/DL (ref 70–110)
POCT GLUCOSE: 161 MG/DL (ref 70–110)
POCT GLUCOSE: 182 MG/DL (ref 70–110)
POTASSIUM SERPL-SCNC: 3.6 MMOL/L (ref 3.5–5.1)
PREALB SERPL-MCNC: 29 MG/DL (ref 20–43)
PROT SERPL-MCNC: 6.6 G/DL (ref 6–8.4)
PROTHROMBIN TIME: 21.8 SEC (ref 9–12.5)
RBC # BLD AUTO: 2.42 M/UL (ref 4.6–6.2)
SODIUM SERPL-SCNC: 133 MMOL/L (ref 136–145)
WBC # BLD AUTO: 7.35 K/UL (ref 3.9–12.7)

## 2024-01-01 PROCEDURE — 93750 INTERROGATION VAD IN PERSON: CPT | Mod: ,,, | Performed by: INTERNAL MEDICINE

## 2024-01-01 PROCEDURE — 94761 N-INVAS EAR/PLS OXIMETRY MLT: CPT

## 2024-01-01 PROCEDURE — 25000003 PHARM REV CODE 250: Performed by: STUDENT IN AN ORGANIZED HEALTH CARE EDUCATION/TRAINING PROGRAM

## 2024-01-01 PROCEDURE — 51798 US URINE CAPACITY MEASURE: CPT

## 2024-01-01 PROCEDURE — 83735 ASSAY OF MAGNESIUM: CPT | Performed by: INTERNAL MEDICINE

## 2024-01-01 PROCEDURE — 94664 DEMO&/EVAL PT USE INHALER: CPT

## 2024-01-01 PROCEDURE — 99900035 HC TECH TIME PER 15 MIN (STAT)

## 2024-01-01 PROCEDURE — 80048 BASIC METABOLIC PNL TOTAL CA: CPT | Performed by: NURSE PRACTITIONER

## 2024-01-01 PROCEDURE — 94799 UNLISTED PULMONARY SVC/PX: CPT

## 2024-01-01 PROCEDURE — 25000003 PHARM REV CODE 250: Performed by: INTERNAL MEDICINE

## 2024-01-01 PROCEDURE — 86140 C-REACTIVE PROTEIN: CPT | Performed by: INTERNAL MEDICINE

## 2024-01-01 PROCEDURE — 82330 ASSAY OF CALCIUM: CPT | Performed by: PHYSICIAN ASSISTANT

## 2024-01-01 PROCEDURE — 27000248 HC VAD-ADDITIONAL DAY

## 2024-01-01 PROCEDURE — 20600001 HC STEP DOWN PRIVATE ROOM

## 2024-01-01 PROCEDURE — 27000646 HC AEROBIKA DEVICE

## 2024-01-01 PROCEDURE — 85025 COMPLETE CBC W/AUTO DIFF WBC: CPT | Performed by: INTERNAL MEDICINE

## 2024-01-01 PROCEDURE — 85610 PROTHROMBIN TIME: CPT | Performed by: INTERNAL MEDICINE

## 2024-01-01 PROCEDURE — 83880 ASSAY OF NATRIURETIC PEPTIDE: CPT | Performed by: STUDENT IN AN ORGANIZED HEALTH CARE EDUCATION/TRAINING PROGRAM

## 2024-01-01 PROCEDURE — 25000003 PHARM REV CODE 250

## 2024-01-01 PROCEDURE — 80076 HEPATIC FUNCTION PANEL: CPT | Performed by: INTERNAL MEDICINE

## 2024-01-01 PROCEDURE — 99233 SBSQ HOSP IP/OBS HIGH 50: CPT | Mod: ,,, | Performed by: NURSE PRACTITIONER

## 2024-01-01 PROCEDURE — 94668 MNPJ CHEST WALL SBSQ: CPT

## 2024-01-01 PROCEDURE — 84134 ASSAY OF PREALBUMIN: CPT | Performed by: NURSE PRACTITIONER

## 2024-01-01 PROCEDURE — 84100 ASSAY OF PHOSPHORUS: CPT | Performed by: INTERNAL MEDICINE

## 2024-01-01 PROCEDURE — 25000003 PHARM REV CODE 250: Performed by: NURSE PRACTITIONER

## 2024-01-01 PROCEDURE — 83615 LACTATE (LD) (LDH) ENZYME: CPT | Performed by: STUDENT IN AN ORGANIZED HEALTH CARE EDUCATION/TRAINING PROGRAM

## 2024-01-01 RX ADMIN — ACETAMINOPHEN 1000 MG: 500 TABLET ORAL at 09:01

## 2024-01-01 RX ADMIN — Medication: at 08:01

## 2024-01-01 RX ADMIN — GABAPENTIN 125 MG: 250 SOLUTION ORAL at 08:01

## 2024-01-01 RX ADMIN — Medication: at 09:01

## 2024-01-01 RX ADMIN — INSULIN ASPART 4 UNITS: 100 INJECTION, SOLUTION INTRAVENOUS; SUBCUTANEOUS at 12:01

## 2024-01-01 RX ADMIN — GABAPENTIN 125 MG: 250 SOLUTION ORAL at 09:01

## 2024-01-01 RX ADMIN — INSULIN ASPART 4 UNITS: 100 INJECTION, SOLUTION INTRAVENOUS; SUBCUTANEOUS at 04:01

## 2024-01-01 RX ADMIN — ZOLPIDEM TARTRATE 5 MG: 5 TABLET ORAL at 12:01

## 2024-01-01 RX ADMIN — ACETAMINOPHEN 1000 MG: 500 TABLET ORAL at 02:01

## 2024-01-01 RX ADMIN — AMIODARONE HYDROCHLORIDE 400 MG: 200 TABLET ORAL at 09:01

## 2024-01-01 RX ADMIN — VENLAFAXINE 37.5 MG: 37.5 TABLET ORAL at 09:01

## 2024-01-01 RX ADMIN — POTASSIUM BICARBONATE 40 MEQ: 391 TABLET, EFFERVESCENT ORAL at 09:01

## 2024-01-01 RX ADMIN — INSULIN ASPART 4 UNITS: 100 INJECTION, SOLUTION INTRAVENOUS; SUBCUTANEOUS at 09:01

## 2024-01-01 RX ADMIN — ACETAMINOPHEN 1000 MG: 500 TABLET ORAL at 08:01

## 2024-01-01 RX ADMIN — WARFARIN SODIUM 2.5 MG: 2.5 TABLET ORAL at 05:01

## 2024-01-01 RX ADMIN — INSULIN ASPART 4 UNITS: 100 INJECTION, SOLUTION INTRAVENOUS; SUBCUTANEOUS at 08:01

## 2024-01-01 RX ADMIN — INSULIN ASPART 4 UNITS: 100 INJECTION, SOLUTION INTRAVENOUS; SUBCUTANEOUS at 05:01

## 2024-01-01 RX ADMIN — FAMOTIDINE 20 MG: 20 TABLET, FILM COATED ORAL at 09:01

## 2024-01-01 NOTE — CARE UPDATE
-Glucose Goal 140-180    -A1C:   Hemoglobin A1C   Date Value Ref Range Status   10/12/2023 7.6 (H) 4.0 - 5.6 % Final     Comment:     ADA Screening Guidelines:  5.7-6.4%  Consistent with prediabetes  >or=6.5%  Consistent with diabetes    High levels of fetal hemoglobin interfere with the HbA1C  assay. Heterozygous hemoglobin variants (HbS, HgC, etc)do  not significantly interfere with this assay.   However, presence of multiple variants may affect accuracy.           -HOME REGIMEN: metformin     -GLUCOSE TREND FOR THE PAST 24HRS:   Recent Labs   Lab 12/31/23  0836 12/31/23  1226 12/31/23  1638 12/31/23  2346 01/01/24  0411 01/01/24  0744   POCTGLUCOSE 145* 106 86 126* 157* 156*           -NO HYPOGYCEMIAS NOTED     - Diet  Diet NPO Except for: Medication (per NG tube)    -Steroids - none     -Tube Feeds - TF @ 45ml/hr         Plan:   - Novolog 4 units q 4 hr to cover TF- hold if TF held or BG < 100   - LDC (150/50) prn q4hr   - POCT Glucose every 4 hours  - Hypoglycemia protocol in place      ** Please notify Endocrine for any change and/or advance in diet**  ** Please call Endocrine for any BG related issues **     Discharge Planning:   TBD. Please notify endocrinology prior to discharge.

## 2024-01-01 NOTE — PROGRESS NOTES
[facility] - Cardiology Stepdown  Heart Transplant  Progress Note    Patient Name: Radha Abbott  MRN: [id number]  Admission Date: 11/9/2023  Hospital Length of Stay: 53 days  Attending Physician: Brayan Ocampo MD  Primary Care Provider: Vasu Kong MD  Principal Problem:LVAD (left ventricular assist device) present    Subjective:   Interval History: No complaints or overnight events. Slept better with trazodone and quite awake this am.     Continuous Infusions:   sodium chloride 0.9%      dextrose 10 % in water (D10W)       Scheduled Meds:   acetaminophen  1,000 mg Per NG tube TID    amiodarone  400 mg Per NG tube Daily    balsam peru-castor oiL   Topical (Top) BID    famotidine  20 mg Per G Tube Daily    gabapentin  125 mg Per NG tube Q12H    insulin aspart U-100  4 Units Subcutaneous 6 times per day    polyethylene glycol  17 g Per NG tube Daily    psyllium husk (aspartame)  1 packet Per NG tube Daily    senna-docusate 8.6-50 mg  2 tablet Per NG tube Daily    traZODone  50 mg Per NG tube QHS    venlafaxine  37.5 mg Per G Tube Daily    warfarin  2.5 mg Per G Tube Every Mon, Wed, Fri    warfarin  5 mg Per G Tube Every Tues, Thurs, Sat, Sun     PRN Meds:bisacodyL, dextrose 10 % in water (D10W), dextrose 10%, dextrose 10%, glucagon (human recombinant), heparin (porcine), hydrALAZINE, insulin aspart U-100, ondansetron, Flushing PICC/Midline Protocol **AND** [DISCONTINUED] sodium chloride 0.9% **AND** sodium chloride 0.9%, traMADoL, zolpidem    Review of patient's allergies indicates:  No Known Allergies  Objective:     Vital Signs (Most Recent):  Temp: 98 °F (36.7 °C) (01/01/24 0741)  Pulse: 102 (01/01/24 0741)  Resp: 18 (01/01/24 0741)  BP: (!) 70/0 (01/01/24 0741)  SpO2: 99 % (01/01/24 0741) Vital Signs (24h Range):  Temp:  [97.6 °F (36.4 °C)-98.8 °F (37.1 °C)] 98 °F (36.7 °C)  Pulse:  [] 102  Resp:  [17-18] 18  SpO2:  [95 %-100 %] 99 %  BP: ()/(0-69) 70/0     No data found.  Body mass index is  24.55 kg/m².      Intake/Output Summary (Last 24 hours) at 1/1/2024 1018  Last data filed at 1/1/2024 0741  Gross per 24 hour   Intake 950 ml   Output 90 ml   Net 860 ml         Hemodynamic Parameters:  CVP:  [8 mmHg] 8 mmHg    Telemetry: SR       Physical Exam  Constitutional:       Comments: Cachectic, temporal wasting   HENT:      Head: Normocephalic and atraumatic.   Eyes:      Conjunctiva/sclera: Conjunctivae normal.      Pupils: Pupils are equal, round, and reactive to light.   Neck:      Comments: Do not appreciate elevated JVP  Cardiovascular:      Rate and Rhythm: Normal rate and regular rhythm.      Comments: Smooth VAD hum  Pulmonary:      Effort: Pulmonary effort is normal.      Breath sounds: Normal breath sounds.   Abdominal:      General: Bowel sounds are normal.      Palpations: Abdomen is soft.   Musculoskeletal:         General: No swelling. Normal range of motion.      Cervical back: Normal range of motion and neck supple.   Skin:     General: Skin is warm and dry.      Capillary Refill: Capillary refill takes 2 to 3 seconds.   Neurological:      General: No focal deficit present.      Mental Status: He is alert and oriented to person, place, and time.            Significant Labs:  CBC:  Recent Labs   Lab 12/30/23 0439 12/31/23 0426 01/01/24  0412   WBC 8.98 7.92 7.35   RBC 2.41* 2.44* 2.42*   HGB 6.9* 7.0* 6.9*   HCT 22.6* 22.4* 21.8*   * 452* 409   MCV 94 92 90   MCH 28.6 28.7 28.5   MCHC 30.5* 31.3* 31.7*       BNP:  Recent Labs   Lab 12/27/23 0450 12/29/23 0406 01/01/24  0412   BNP 1,779* 2,177* 794*       CMP:  Recent Labs   Lab 12/30/23 0439 12/31/23 0426 01/01/24  0412   * 126* 152*   CALCIUM 9.1 8.0* 8.3*   ALBUMIN 1.9* 1.9* 1.9*   PROT 6.7 6.7 6.6   * 132* 133*   K 4.1 3.4* 3.6   CO2 21* 27 25    98 97   BUN 65* 36* 52*   CREATININE 4.7* 3.1* 4.5*   ALKPHOS 178* 179* 159*   ALT 12 13 17   AST 35 37 36   BILITOT 0.4 0.4 0.4        Coagulation:   Recent Labs    Lab 12/30/23  0439 12/31/23 0426 01/01/24  0412   INR 2.5* 2.4* 2.1*       LDH:  Recent Labs   Lab 12/30/23  0439 12/31/23  0426 01/01/24  0412   * 369* 353*       Microbiology:  Microbiology Results (last 7 days)       ** No results found for the last 168 hours. **            I have reviewed all pertinent labs within the past 24 hours.    Estimated Creatinine Clearance: 18.9 mL/min (A) (based on SCr of 4.5 mg/dL (H)).    Diagnostic Results:  I have reviewed and interpreted all pertinent imaging results/findings within the past 24 hours.  Assessment and Plan:     63 year old male with hx of CAD s/p 3v CABG (unclear anatomy, 2009), ICM with a recent EF of 15-20% s/p ICD (elsa 2009), DM2 (a1c 7.7), HTN, HLD, Vfib on amio who presents to the ED with CC of SOB     [place] [facility] [place]  He was started on a Lasix gtt and did well.  Started on gDMT and discharged home on  5 with plans to follow up in HTS clinic for ongoing transplant evaluation at another facility.  He says that about a few days ago he started to notice LE swelling.  This turned into Nelson and orthopnea.  He says he can't walk to the bathroom without getting SOB.  He also complains of weight gain.  He takes torsemide 20mg BID at home and was told to trial additional Lasix which he did without any improvement.  He was rx metolazone but has not been filled.  He came to the ED     In the ED he was AF with stable VS on RA.  CBC showing chronic anemia.  CMP notable for acute on chronic CKD with baseline around 2.1 and Cr now 2.6.  BNP elevated.  Lactate neg.  CXR showing pulmonary edema.  I evaluated the pt at the bedside.  Bedside TTE showing CVP >15.  He was subsequently admitted to the CCU for diuresis.     He was continued on his home  and was started on a lasix gtt, which he responded well to overnight (net -1700cc. He feels much better this morning as well. Our transplant coordinators have been working on  insurance approval and he is now being transferred to [facility] service for transplant evaluation.     * LVAD (left ventricular assist device) present  -HeartMate 3 Implanted  12/1/2023  as DT  -HTS Primary  -Implanted by Dr. Washington  -Continue Coumadin, dosing by PharmD.  Goal INR 2.0-3.0 . Therapeutic today.   -Antiplatelets Not on ASA  -LDH is stable overall today. Will continue to monitor daily.  -Weaned off midodrine.  -Speed set at  4900   rpm, LSL 4500 rpm   -Interrogation notable for no events  -ECHO 12/11 AV does not open, IVS midline, LVEDD 6.1, with HM3 speed set to 4900 rpm. Will repeat this week.   -Not listed for OHTx, declined for OHTX due to comorbidities   -PT/OT/Speech. Recommending rehab but HD+LVAD may be barrier.       Procedure: Device Interrogation Including analysis of device parameters  Current Settings: Ventricular Assist Device  Review of device function is stable/unstable stable        1/1/2024     7:36 AM 1/1/2024     4:03 AM 12/31/2023    11:43 PM 12/31/2023     7:56 PM 12/31/2023     5:00 PM 12/31/2023    12:38 PM 12/31/2023     8:30 AM   TXP LVAD INTERROGATIONS   Type HeartMate3 HeartMate3 HeartMate3 HeartMate3 HeartMate3 HeartMate3 HeartMate3   Flow 4.1 3.9 4 4 3.9 3.8 4.9   Speed 4900 4900 4900 4900 4900 4900 4900   PI 3.7 4.5 4.5 4.3 4.8 5.4 3.8   Power (Carrington) 3.2 3.2 3.3 3.2 3.6 3.4 3.3   LSL 4500   4500 4500 4500 4500   Low Flow Alarm no    no no no   High Power Alarm no    no no no   Pulsatility Intermittent pulse Intermittent pulse Intermittent pulse  Intermittent pulse Intermittent pulse Intermittent pulse         Moderate malnutrition  -RD and SLP following  -Tolerating tube feeds. Total daily caloric intake increased to 2160  -Failed MBBS 12/20, continue TF.  -Speech following. Plan to repeat swallow study this week.     Acute renal failure with acute tubular necrosis superimposed on stage 3b chronic kidney disease  IVÁN on CKD2. Baseline Cr of 1.7-2.0.   - Hold ARNi  -Trend BMPs  daily   -Nephrology following   -SLED/SCUF for volume removal began on 12/1. He is anuric  -Did not respond to diuretic challenge with 240 mg IV Lasix, 1000 mg Diuril, and 500 mg IV Diamox done 12/14  -Now on HD  -Tunneled trialysis line placed 12/22 by IR    Unilateral complete vocal fold paralysis  -Appreciate ENT's help  -S/P augmentation of paralyzed left vocal cord 12/18    Dysphonia  -Speech following closely  -Appreciate ENT's help. S/P augmentation of paralyzed left vocal cord 12/18    Depressed mood  -Psychiatry consulted for depressed mood, SI when left alone  -Recommend sitter when alone (possibly with transition to stepdown).  -Cont Venlafaxine    IVÁN (acute kidney injury)  -Nephrology following      Acute on chronic combined systolic and diastolic CHF, NYHA class 4  Pt with known ICM with an EF of 25% on home  presented with decompensated HF.    -S/P LVAD    PAF (paroxysmal atrial fibrillation)  Known hx of pAF. In sinus rhythm on admission, but 1 run of AF RVR overnight. He spontaneously converted.  - Continue home amiodarone 400mg qd  - Continue AC      Type 2 diabetes mellitus without complication, without long-term current use of insulin  -A1c 7.6, not insulin dependent  - Endocrine following, appreciate assistance with blood glucose management    CAD (coronary artery disease)  -S/p 3vCABG in 2009  -Lipitor on hold 2/2 mild transaminitis  -Not on ASA post VAD    Ventricular tachycardia  Hx VT s/p ICD placement (medtronic 2009)  - Continue home amio 400mg qd        Erasto Park NP  Heart Transplant  [facility] - Cardiology Stepdown

## 2024-01-01 NOTE — ASSESSMENT & PLAN NOTE
-Psychiatry consulted for depressed mood, SI when left alone  -Recommend sitter when alone (possibly with transition to stepdown).  -Cont Venlafaxine

## 2024-01-01 NOTE — PROGRESS NOTES
12/31/2023  Deni Frye    Current provider:  Brayan Ocampo MD    Device interrogation:      12/31/2023     7:56 PM 12/31/2023     5:00 PM 12/31/2023    12:38 PM 12/31/2023     8:30 AM 12/31/2023     4:00 AM 12/31/2023    12:06 AM 12/30/2023     8:36 PM   TXP LVAD INTERROGATIONS   Type HeartMate3 HeartMate3 HeartMate3 HeartMate3 HeartMate3 HeartMate3 HeartMate3   Flow 4 3.9 3.8 4.9 3.8 4.2 4.2   Speed 4900 4900 4900 4900 4900 4900 4900   PI 4.3 4.8 5.4 3.8 4.7 3.6 3.5   Power (Carrington) 3.2 3.6 3.4 3.3 3.3 3.2 3.3   LSL 4500 4500 4500 4500 4500 4500 4500   Low Flow Alarm  no no no      High Power Alarm  no no no      Pulsatility  Intermittent pulse Intermittent pulse Intermittent pulse Intermittent pulse Intermittent pulse Intermittent pulse          Rounded on [facility] to ensure all mechanical assist device settings (IABP or VAD) were appropriate and all parameters were within limits.  I was able to ensure all back up equipment was present, the staff had no issues, and the Perfusion Department daily rounding was complete.      For implantable VADs: Interrogation of Ventricular assist device was performed with analysis of device parameters and review of device function. I have personally reviewed the interrogation findings and agree with findings as stated.     In emergency, the nursing units have been notified to contact the perfusion department either by:  Calling [id number] from 630am to 4pm Mon thru Fri, utilizing the On-Call Finder functionality of Epic and searching for Perfusion, or by contacting the hospital  from 4pm to 630am and on weekends and asking to speak with the perfusionist on call.    9:39 PM

## 2024-01-01 NOTE — PROGRESS NOTES
01/01/2024  Deni Frye    Current provider:  Brayan Ocampo MD    Device interrogation:      1/1/2024     7:36 AM 1/1/2024     4:03 AM 12/31/2023    11:43 PM 12/31/2023     7:56 PM 12/31/2023     5:00 PM 12/31/2023    12:38 PM 12/31/2023     8:30 AM   TXP LVAD INTERROGATIONS   Type HeartMate3 HeartMate3 HeartMate3 HeartMate3 HeartMate3 HeartMate3 HeartMate3   Flow 4.1 3.9 4 4 3.9 3.8 4.9   Speed 4900 4900 4900 4900 4900 4900 4900   PI 3.7 4.5 4.5 4.3 4.8 5.4 3.8   Power ([place]) 3.2 3.2 3.3 3.2 3.6 3.4 3.3   LSL 4500   4500 4500 4500 4500   Low Flow Alarm no    no no no   High Power Alarm no    no no no   Pulsatility Intermittent pulse Intermittent pulse Intermittent pulse  Intermittent pulse Intermittent pulse Intermittent pulse          Rounded on [facility] to ensure all mechanical assist device settings (IABP or VAD) were appropriate and all parameters were within limits.  I was able to ensure all back up equipment was present, the staff had no issues, and the Perfusion Department daily rounding was complete.      For implantable VADs: Interrogation of Ventricular assist device was performed with analysis of device parameters and review of device function. I have personally reviewed the interrogation findings and agree with findings as stated.     In emergency, the nursing units have been notified to contact the perfusion department either by:  Calling [id number] from 630am to 4pm Mon thru Fri, utilizing the On-Call Finder functionality of Epic and searching for Perfusion, or by contacting the hospital  from 4pm to 630am and on weekends and asking to speak with the perfusionist on call.    8:01 AM

## 2024-01-01 NOTE — ASSESSMENT & PLAN NOTE
-HeartMate 3 Implanted  12/1/2023  as DT  -HTS Primary  -Implanted by Dr. Washington  -Continue Coumadin, dosing by PharmD.  Goal INR 2.0-3.0 . Therapeutic today.   -Antiplatelets Not on ASA  -LDH is stable overall today. Will continue to monitor daily.  -Weaned off midodrine.  -Speed set at  4900   rpm, LSL 4500 rpm   -Interrogation notable for no events  -ECHO 12/11 AV does not open, IVS midline, LVEDD 6.1, with HM3 speed set to 4900 rpm. Will repeat this week.   -Not listed for OHTx, declined for OHTX due to comorbidities   -PT/OT/Speech. Recommending rehab but HD+LVAD may be barrier.       Procedure: Device Interrogation Including analysis of device parameters  Current Settings: Ventricular Assist Device  Review of device function is stable/unstable stable        1/1/2024     7:36 AM 1/1/2024     4:03 AM 12/31/2023    11:43 PM 12/31/2023     7:56 PM 12/31/2023     5:00 PM 12/31/2023    12:38 PM 12/31/2023     8:30 AM   TXP LVAD INTERROGATIONS   Type HeartMate3 HeartMate3 HeartMate3 HeartMate3 HeartMate3 HeartMate3 HeartMate3   Flow 4.1 3.9 4 4 3.9 3.8 4.9   Speed 4900 4900 4900 4900 4900 4900 4900   PI 3.7 4.5 4.5 4.3 4.8 5.4 3.8   Power ([place]) 3.2 3.2 3.3 3.2 3.6 3.4 3.3   LSL 4500   4500 4500 4500 4500   Low Flow Alarm no    no no no   High Power Alarm no    no no no   Pulsatility Intermittent pulse Intermittent pulse Intermittent pulse  Intermittent pulse Intermittent pulse Intermittent pulse

## 2024-01-01 NOTE — PLAN OF CARE
VSS, LVAD # WNL. TF advanced to goal rate @45, BG monitored, insulin given per order. IVP lasix, minimal UOP, bladder scan 52cc. Pt repositioned in bed, HOB elevated per order  Pt/spouse stating R ankle/lower leg & R heel wound is not healing under current treatment. Wound care consulted     Problem: Adult Inpatient Plan of Care  Goal: Patient-Specific Goal (Individualized)  Outcome: Ongoing, Progressing     Problem: Fluid and Electrolyte Imbalance (Acute Kidney Injury/Impairment)  Goal: Fluid and Electrolyte Balance  Outcome: Ongoing, Progressing     Problem: Cardiac Output Decreased (Heart Failure)  Goal: Optimal Cardiac Output  Outcome: Ongoing, Progressing

## 2024-01-01 NOTE — ASSESSMENT & PLAN NOTE
-RD and SLP following  -Tolerating tube feeds. Total daily caloric intake increased to 2160  -Failed MBBS 12/20, continue TF.  -Speech following. Plan to repeat swallow study this week.

## 2024-01-01 NOTE — SUBJECTIVE & OBJECTIVE
Interval History: No complaints or overnight events. Slept better with trazodone and quite awake this am.     Continuous Infusions:   sodium chloride 0.9%      dextrose 10 % in water (D10W)       Scheduled Meds:   acetaminophen  1,000 mg Per NG tube TID    amiodarone  400 mg Per NG tube Daily    balsam peru-castor oiL   Topical (Top) BID    famotidine  20 mg Per G Tube Daily    gabapentin  125 mg Per NG tube Q12H    insulin aspart U-100  4 Units Subcutaneous 6 times per day    polyethylene glycol  17 g Per NG tube Daily    psyllium husk (aspartame)  1 packet Per NG tube Daily    senna-docusate 8.6-50 mg  2 tablet Per NG tube Daily    traZODone  50 mg Per NG tube QHS    venlafaxine  37.5 mg Per G Tube Daily    warfarin  2.5 mg Per G Tube Every Mon, Wed, Fri    warfarin  5 mg Per G Tube Every Tues, Thurs, Sat, Sun     PRN Meds:bisacodyL, dextrose 10 % in water (D10W), dextrose 10%, dextrose 10%, glucagon (human recombinant), heparin (porcine), hydrALAZINE, insulin aspart U-100, ondansetron, Flushing PICC/Midline Protocol **AND** [DISCONTINUED] sodium chloride 0.9% **AND** sodium chloride 0.9%, traMADoL, zolpidem    Review of patient's allergies indicates:  No Known Allergies  Objective:     Vital Signs (Most Recent):  Temp: 98 °F (36.7 °C) (01/01/24 0741)  Pulse: 102 (01/01/24 0741)  Resp: 18 (01/01/24 0741)  BP: (!) 70/0 (01/01/24 0741)  SpO2: 99 % (01/01/24 0741) Vital Signs (24h Range):  Temp:  [97.6 °F (36.4 °C)-98.8 °F (37.1 °C)] 98 °F (36.7 °C)  Pulse:  [] 102  Resp:  [17-18] 18  SpO2:  [95 %-100 %] 99 %  BP: ()/(0-69) 70/0     No data found.  Body mass index is 24.55 kg/m².      Intake/Output Summary (Last 24 hours) at 1/1/2024 1018  Last data filed at 1/1/2024 0741  Gross per 24 hour   Intake 950 ml   Output 90 ml   Net 860 ml         Hemodynamic Parameters:  CVP:  [8 mmHg] 8 mmHg    Telemetry: SR       Physical Exam  Constitutional:       Comments: Cachectic, temporal wasting   HENT:      Head:  Normocephalic and atraumatic.   Eyes:      Conjunctiva/sclera: Conjunctivae normal.      Pupils: Pupils are equal, round, and reactive to light.   Neck:      Comments: Do not appreciate elevated JVP  Cardiovascular:      Rate and Rhythm: Normal rate and regular rhythm.      Comments: Smooth VAD hum  Pulmonary:      Effort: Pulmonary effort is normal.      Breath sounds: Normal breath sounds.   Abdominal:      General: Bowel sounds are normal.      Palpations: Abdomen is soft.   Musculoskeletal:         General: No swelling. Normal range of motion.      Cervical back: Normal range of motion and neck supple.   Skin:     General: Skin is warm and dry.      Capillary Refill: Capillary refill takes 2 to 3 seconds.   Neurological:      General: No focal deficit present.      Mental Status: He is alert and oriented to person, place, and time.            Significant Labs:  CBC:  Recent Labs   Lab 12/30/23 0439 12/31/23 0426 01/01/24  0412   WBC 8.98 7.92 7.35   RBC 2.41* 2.44* 2.42*   HGB 6.9* 7.0* 6.9*   HCT 22.6* 22.4* 21.8*   * 452* 409   MCV 94 92 90   MCH 28.6 28.7 28.5   MCHC 30.5* 31.3* 31.7*       BNP:  Recent Labs   Lab 12/27/23 0450 12/29/23  0406 01/01/24  0412   BNP 1,779* 2,177* 794*       CMP:  Recent Labs   Lab 12/30/23 0439 12/31/23 0426 01/01/24  0412   * 126* 152*   CALCIUM 9.1 8.0* 8.3*   ALBUMIN 1.9* 1.9* 1.9*   PROT 6.7 6.7 6.6   * 132* 133*   K 4.1 3.4* 3.6   CO2 21* 27 25    98 97   BUN 65* 36* 52*   CREATININE 4.7* 3.1* 4.5*   ALKPHOS 178* 179* 159*   ALT 12 13 17   AST 35 37 36   BILITOT 0.4 0.4 0.4        Coagulation:   Recent Labs   Lab 12/30/23 0439 12/31/23 0426 01/01/24  0412   INR 2.5* 2.4* 2.1*       LDH:  Recent Labs   Lab 12/30/23  0439 12/31/23  0426 01/01/24  0412   * 369* 353*       Microbiology:  Microbiology Results (last 7 days)       ** No results found for the last 168 hours. **            I have reviewed all pertinent labs within the past 24  hours.    Estimated Creatinine Clearance: 18.9 mL/min (A) (based on SCr of 4.5 mg/dL (H)).    Diagnostic Results:  I have reviewed and interpreted all pertinent imaging results/findings within the past 24 hours.

## 2024-01-02 PROBLEM — F43.21 ADJUSTMENT DISORDER WITH DEPRESSED MOOD: Status: ACTIVE | Noted: 2024-01-02

## 2024-01-02 PROBLEM — T14.8XXA BLOOD BLISTER: Status: ACTIVE | Noted: 2024-01-02

## 2024-01-02 PROBLEM — T14.8XXA DEEP TISSUE INJURY: Status: ACTIVE | Noted: 2024-01-02

## 2024-01-02 LAB
ALBUMIN SERPL BCP-MCNC: 1.9 G/DL (ref 3.5–5.2)
ALP SERPL-CCNC: 175 U/L (ref 55–135)
ALT SERPL W/O P-5'-P-CCNC: 18 U/L (ref 10–44)
ANION GAP SERPL CALC-SCNC: 11 MMOL/L (ref 8–16)
AST SERPL-CCNC: 36 U/L (ref 10–40)
BASOPHILS # BLD AUTO: 0.04 K/UL (ref 0–0.2)
BASOPHILS NFR BLD: 0.5 % (ref 0–1.9)
BILIRUB DIRECT SERPL-MCNC: 0.2 MG/DL (ref 0.1–0.3)
BILIRUB SERPL-MCNC: 0.3 MG/DL (ref 0.1–1)
BSA FOR ECHO PROCEDURE: 2.04 M2
BUN SERPL-MCNC: 78 MG/DL (ref 8–23)
CALCIUM SERPL-MCNC: 8.6 MG/DL (ref 8.7–10.5)
CHLORIDE SERPL-SCNC: 97 MMOL/L (ref 95–110)
CO2 SERPL-SCNC: 26 MMOL/L (ref 23–29)
CREAT SERPL-MCNC: 5.2 MG/DL (ref 0.5–1.4)
CV ECHO LV RWT: 0.2 CM
DIFFERENTIAL METHOD BLD: ABNORMAL
DOP CALC LVOT AREA: 3.8 CM2
DOP CALC LVOT DIAMETER: 2.19 CM
E/E' RATIO: 14.2 M/S
ECHO LV POSTERIOR WALL: 0.63 CM (ref 0.6–1.1)
EOSINOPHIL # BLD AUTO: 0.1 K/UL (ref 0–0.5)
EOSINOPHIL NFR BLD: 1.8 % (ref 0–8)
ERYTHROCYTE [DISTWIDTH] IN BLOOD BY AUTOMATED COUNT: 16.3 % (ref 11.5–14.5)
EST. GFR  (NO RACE VARIABLE): 11.8 ML/MIN/1.73 M^2
FRACTIONAL SHORTENING: 12 % (ref 28–44)
GLUCOSE SERPL-MCNC: 140 MG/DL (ref 70–110)
HCT VFR BLD AUTO: 22.4 % (ref 40–54)
HGB BLD-MCNC: 7 G/DL (ref 14–18)
IMM GRANULOCYTES # BLD AUTO: 0.03 K/UL (ref 0–0.04)
IMM GRANULOCYTES NFR BLD AUTO: 0.4 % (ref 0–0.5)
INR PPP: 2.2 (ref 0.8–1.2)
INTERVENTRICULAR SEPTUM: 0.71 CM (ref 0.6–1.1)
LA MAJOR: 6.48 CM
LA MINOR: 5.59 CM
LA WIDTH: 5.42 CM
LDH SERPL L TO P-CCNC: 351 U/L (ref 110–260)
LEFT ATRIUM SIZE: 4.7 CM
LEFT ATRIUM VOLUME INDEX MOD: 77.7 ML/M2
LEFT ATRIUM VOLUME INDEX: 63.7 ML/M2
LEFT ATRIUM VOLUME MOD: 158.41 CM3
LEFT ATRIUM VOLUME: 129.96 CM3
LEFT INTERNAL DIMENSION IN SYSTOLE: 5.49 CM (ref 2.1–4)
LEFT VENTRICLE DIASTOLIC VOLUME INDEX: 96.77 ML/M2
LEFT VENTRICLE DIASTOLIC VOLUME: 197.42 ML
LEFT VENTRICLE MASS INDEX: 79 G/M2
LEFT VENTRICLE SYSTOLIC VOLUME INDEX: 72.1 ML/M2
LEFT VENTRICLE SYSTOLIC VOLUME: 147.01 ML
LEFT VENTRICULAR INTERNAL DIMENSION IN DIASTOLE: 6.25 CM (ref 3.5–6)
LEFT VENTRICULAR MASS: 161.26 G
LV LATERAL E/E' RATIO: 10.14 M/S
LV SEPTAL E/E' RATIO: 23.67 M/S
LYMPHOCYTES # BLD AUTO: 0.7 K/UL (ref 1–4.8)
LYMPHOCYTES NFR BLD: 9.9 % (ref 18–48)
MAGNESIUM SERPL-MCNC: 2.1 MG/DL (ref 1.6–2.6)
MCH RBC QN AUTO: 29 PG (ref 27–31)
MCHC RBC AUTO-ENTMCNC: 31.3 G/DL (ref 32–36)
MCV RBC AUTO: 93 FL (ref 82–98)
MONOCYTES # BLD AUTO: 0.7 K/UL (ref 0.3–1)
MONOCYTES NFR BLD: 9.2 % (ref 4–15)
MV PEAK E VEL: 0.71 M/S
NEUTROPHILS # BLD AUTO: 5.8 K/UL (ref 1.8–7.7)
NEUTROPHILS NFR BLD: 78.2 % (ref 38–73)
NRBC BLD-RTO: 0 /100 WBC
PHOSPHATE SERPL-MCNC: 2.7 MG/DL (ref 2.7–4.5)
PISA TR MAX VEL: 2.47 M/S
PLATELET # BLD AUTO: 450 K/UL (ref 150–450)
PMV BLD AUTO: 9.9 FL (ref 9.2–12.9)
POCT GLUCOSE: 136 MG/DL (ref 70–110)
POCT GLUCOSE: 139 MG/DL (ref 70–110)
POCT GLUCOSE: 146 MG/DL (ref 70–110)
POCT GLUCOSE: 151 MG/DL (ref 70–110)
POCT GLUCOSE: 155 MG/DL (ref 70–110)
POCT GLUCOSE: 192 MG/DL (ref 70–110)
POCT GLUCOSE: 198 MG/DL (ref 70–110)
POTASSIUM SERPL-SCNC: 4.1 MMOL/L (ref 3.5–5.1)
PROT SERPL-MCNC: 6.8 G/DL (ref 6–8.4)
PROTHROMBIN TIME: 22.4 SEC (ref 9–12.5)
RA MAJOR: 4.6 CM
RA WIDTH: 3.48 CM
RBC # BLD AUTO: 2.41 M/UL (ref 4.6–6.2)
RIGHT VENTRICULAR END-DIASTOLIC DIMENSION: 3.19 CM
SINUS: 3.48 CM
SODIUM SERPL-SCNC: 134 MMOL/L (ref 136–145)
STJ: 2.52 CM
TDI LATERAL: 0.07 M/S
TDI SEPTAL: 0.03 M/S
TDI: 0.05 M/S
TR MAX PG: 24 MMHG
TRICUSPID ANNULAR PLANE SYSTOLIC EXCURSION: 1.39 CM
WBC # BLD AUTO: 7.39 K/UL (ref 3.9–12.7)
Z-SCORE OF LEFT VENTRICULAR DIMENSION IN END DIASTOLE: 0.27
Z-SCORE OF LEFT VENTRICULAR DIMENSION IN END SYSTOLE: 3.06

## 2024-01-02 PROCEDURE — 97535 SELF CARE MNGMENT TRAINING: CPT

## 2024-01-02 PROCEDURE — 63600175 PHARM REV CODE 636 W HCPCS: Performed by: NURSE PRACTITIONER

## 2024-01-02 PROCEDURE — 25000003 PHARM REV CODE 250: Performed by: NURSE PRACTITIONER

## 2024-01-02 PROCEDURE — 83735 ASSAY OF MAGNESIUM: CPT | Performed by: INTERNAL MEDICINE

## 2024-01-02 PROCEDURE — 80100014 HC HEMODIALYSIS 1:1

## 2024-01-02 PROCEDURE — 99232 SBSQ HOSP IP/OBS MODERATE 35: CPT | Mod: FS,,, | Performed by: STUDENT IN AN ORGANIZED HEALTH CARE EDUCATION/TRAINING PROGRAM

## 2024-01-02 PROCEDURE — 94799 UNLISTED PULMONARY SVC/PX: CPT

## 2024-01-02 PROCEDURE — 94761 N-INVAS EAR/PLS OXIMETRY MLT: CPT

## 2024-01-02 PROCEDURE — 97112 NEUROMUSCULAR REEDUCATION: CPT

## 2024-01-02 PROCEDURE — 92526 ORAL FUNCTION THERAPY: CPT

## 2024-01-02 PROCEDURE — 25000003 PHARM REV CODE 250

## 2024-01-02 PROCEDURE — 93750 INTERROGATION VAD IN PERSON: CPT | Mod: ,,, | Performed by: INTERNAL MEDICINE

## 2024-01-02 PROCEDURE — 80076 HEPATIC FUNCTION PANEL: CPT | Performed by: INTERNAL MEDICINE

## 2024-01-02 PROCEDURE — 80048 BASIC METABOLIC PNL TOTAL CA: CPT | Performed by: NURSE PRACTITIONER

## 2024-01-02 PROCEDURE — 97530 THERAPEUTIC ACTIVITIES: CPT

## 2024-01-02 PROCEDURE — 85610 PROTHROMBIN TIME: CPT | Performed by: INTERNAL MEDICINE

## 2024-01-02 PROCEDURE — 25000003 PHARM REV CODE 250: Performed by: STUDENT IN AN ORGANIZED HEALTH CARE EDUCATION/TRAINING PROGRAM

## 2024-01-02 PROCEDURE — 99900035 HC TECH TIME PER 15 MIN (STAT)

## 2024-01-02 PROCEDURE — 90792 PSYCH DIAG EVAL W/MED SRVCS: CPT | Mod: ,,, | Performed by: NURSE PRACTITIONER

## 2024-01-02 PROCEDURE — 90832 PSYTX W PT 30 MINUTES: CPT | Mod: ,,, | Performed by: STUDENT IN AN ORGANIZED HEALTH CARE EDUCATION/TRAINING PROGRAM

## 2024-01-02 PROCEDURE — 25000003 PHARM REV CODE 250: Performed by: INTERNAL MEDICINE

## 2024-01-02 PROCEDURE — 94664 DEMO&/EVAL PT USE INHALER: CPT

## 2024-01-02 PROCEDURE — 97116 GAIT TRAINING THERAPY: CPT

## 2024-01-02 PROCEDURE — 84100 ASSAY OF PHOSPHORUS: CPT | Performed by: INTERNAL MEDICINE

## 2024-01-02 PROCEDURE — 83615 LACTATE (LD) (LDH) ENZYME: CPT | Performed by: STUDENT IN AN ORGANIZED HEALTH CARE EDUCATION/TRAINING PROGRAM

## 2024-01-02 PROCEDURE — 20600001 HC STEP DOWN PRIVATE ROOM

## 2024-01-02 PROCEDURE — 99233 SBSQ HOSP IP/OBS HIGH 50: CPT | Mod: ,,, | Performed by: PHYSICIAN ASSISTANT

## 2024-01-02 PROCEDURE — 27000248 HC VAD-ADDITIONAL DAY

## 2024-01-02 PROCEDURE — 85025 COMPLETE CBC W/AUTO DIFF WBC: CPT | Performed by: INTERNAL MEDICINE

## 2024-01-02 RX ORDER — WARFARIN SODIUM 5 MG/1
5 TABLET ORAL
Status: DISCONTINUED | OUTPATIENT
Start: 2024-01-02 | End: 2024-01-08

## 2024-01-02 RX ORDER — SODIUM CHLORIDE 9 MG/ML
INJECTION, SOLUTION INTRAVENOUS ONCE
Status: COMPLETED | OUTPATIENT
Start: 2024-01-02 | End: 2024-01-02

## 2024-01-02 RX ADMIN — INSULIN ASPART 4 UNITS: 100 INJECTION, SOLUTION INTRAVENOUS; SUBCUTANEOUS at 11:01

## 2024-01-02 RX ADMIN — INSULIN ASPART 4 UNITS: 100 INJECTION, SOLUTION INTRAVENOUS; SUBCUTANEOUS at 09:01

## 2024-01-02 RX ADMIN — INSULIN ASPART 4 UNITS: 100 INJECTION, SOLUTION INTRAVENOUS; SUBCUTANEOUS at 12:01

## 2024-01-02 RX ADMIN — WARFARIN SODIUM 5 MG: 5 TABLET ORAL at 11:01

## 2024-01-02 RX ADMIN — HEPARIN SODIUM 1000 UNITS: 1000 INJECTION, SOLUTION INTRAVENOUS; SUBCUTANEOUS at 06:01

## 2024-01-02 RX ADMIN — TRAZODONE HYDROCHLORIDE 50 MG: 50 TABLET ORAL at 11:01

## 2024-01-02 RX ADMIN — GABAPENTIN 125 MG: 250 SOLUTION ORAL at 10:01

## 2024-01-02 RX ADMIN — INSULIN ASPART 4 UNITS: 100 INJECTION, SOLUTION INTRAVENOUS; SUBCUTANEOUS at 04:01

## 2024-01-02 RX ADMIN — ACETAMINOPHEN 1000 MG: 500 TABLET ORAL at 10:01

## 2024-01-02 RX ADMIN — AMIODARONE HYDROCHLORIDE 400 MG: 200 TABLET ORAL at 10:01

## 2024-01-02 RX ADMIN — ACETAMINOPHEN 1000 MG: 500 TABLET ORAL at 04:01

## 2024-01-02 RX ADMIN — VENLAFAXINE 37.5 MG: 37.5 TABLET ORAL at 10:01

## 2024-01-02 RX ADMIN — ACETAMINOPHEN 1000 MG: 500 TABLET ORAL at 11:01

## 2024-01-02 RX ADMIN — SODIUM CHLORIDE: 9 INJECTION, SOLUTION INTRAVENOUS at 03:01

## 2024-01-02 RX ADMIN — Medication: at 10:01

## 2024-01-02 RX ADMIN — GABAPENTIN 125 MG: 250 SOLUTION ORAL at 11:01

## 2024-01-02 RX ADMIN — FAMOTIDINE 20 MG: 20 TABLET, FILM COATED ORAL at 10:01

## 2024-01-02 RX ADMIN — ZOLPIDEM TARTRATE 5 MG: 5 TABLET ORAL at 12:01

## 2024-01-02 NOTE — ASSESSMENT & PLAN NOTE
- suspect acute tubular injury secondary to hypotension/cardiogenic shock likely compounded by intra-arterial contrast and anemia with urinary sediment with granular casts    -Will plan for iHD today for clearance and volume management. Target UF 1-2 L as tolerated.   -Will continue TuThSat HD while inpatient.   -Continue bladder scans q shift  -strict I/O's  -renal diet/tube feeds when not NPO, volume restriction per primary team  -renally all dose medications to eGFR  -avoid nephrotoxic agents wean feasible (i.e. NSAIDs, intra-arterial contrast, supra-therapeutic vancomycin levels, etc.)

## 2024-01-02 NOTE — PROGRESS NOTES
Update:  CRISTAL f/up with pt and his spouse.  Pt and spouse reported that they are doing well at this time.  Pt to have swallow study tomorrow, and both reported being hopeful he will pass.  Pt's spouse advised that she needs to have [facility] paperwork resubmitted for her work.  Pt's wife requested to have paperwork emailed to SW to provide to pt's wife.  SW agreed to same.  No other needs reported at this time.  CRISTAL will follow.

## 2024-01-02 NOTE — PROGRESS NOTES
[facility] - Cardiology Stepdown  Skin Integrity ENRRIQUE  Progress Note    Patient Name: Radha Abbott  MRN: [id number]  Admission Date: 11/9/2023  Hospital Length of Stay: 54 days  Attending Physician: Brayan Ocampo MD  Primary Care Provider: Vasu Kong MD         Subjective:     HPI:  Radha Abbott is a 63 year old male with hx of CAD s/p 3v CABG (unclear anatomy, 2009), ICM with a recent EF of 15-20% s/p ICD (medtronik 2009), DM2 (a1c 7.7), HTN, HLD, Vfib on amio who presents to the ED with CC of SOB.     Pt was recently admitted to [facility] as a transfer.  He was started on a Lasix gtt and did well.  Started on gDMT and discharged home on  5 with plans to follow up in HTS clinic for ongoing transplant evaluation at another facility.  He says that about a few days ago he started to notice LE swelling.  This turned into Nelson and orthopnea.  He says he can't walk to the bathroom without getting SOB.  He also complains of weight gain.  He takes torsemide 20mg BID at home and was told to trial additional Lasix which he did without any improvement.  He was rx metolazone but has not been filled.  He came to the ED     In the ED he was AF with stable VS on RA.  CBC showing chronic anemia.  CMP notable for acute on chronic CKD with baseline around 2.1 and Cr now 2.6.  BNP elevated.  Lactate neg.  CXR showing pulmonary edema.  I evaluated the pt at the bedside.  Bedside TTE showing CVP >15.  He was subsequently admitted to the CCU for diuresis. Skin integrity ENRRIQUE consulted for evaluation of skin injury.    Hospital Course:   No notes on file      Follow-up For: Procedure(s) (LRB):  CLOSURE, WOUND, STERNUM (N/A)  INSERTION, GRAFT, PERICARDIUM  DRAINAGE, PLEURAL EFFUSION    Post-Operative Day: 29 Days Post-Op    Scheduled Meds:   sodium chloride 0.9%   Intravenous Once    acetaminophen  1,000 mg Per NG tube TID    amiodarone  400 mg Per NG tube Daily    balsam peru-castor oiL   Topical (Top) BID    famotidine  20 mg Per G  Tube Daily    gabapentin  125 mg Per NG tube Q12H    insulin aspart U-100  4 Units Subcutaneous 6 times per day    polyethylene glycol  17 g Per NG tube Daily    psyllium husk (aspartame)  1 packet Per NG tube Daily    senna-docusate 8.6-50 mg  2 tablet Per NG tube Daily    traZODone  50 mg Per NG tube QHS    venlafaxine  37.5 mg Per G Tube Daily    warfarin  2.5 mg Per G Tube Every Mon, Wed, Fri    warfarin  5 mg Per G Tube Every Tues, Thurs, Sat, Sun     Continuous Infusions:   sodium chloride 0.9%      dextrose 10 % in water (D10W)       PRN Meds:bisacodyL, dextrose 10 % in water (D10W), dextrose 10%, dextrose 10%, glucagon (human recombinant), heparin (porcine), hydrALAZINE, insulin aspart U-100, ondansetron, Flushing PICC/Midline Protocol **AND** [DISCONTINUED] sodium chloride 0.9% **AND** sodium chloride 0.9%, traMADoL, zolpidem    Review of Systems   Skin:  Positive for wound.     Objective:     Vital Signs (Most Recent):  Temp: 98.1 °F (36.7 °C) (01/02/24 1537)  Pulse: 93 (01/02/24 1700)  Resp: 18 (01/02/24 1550)  BP: 91/65 (01/02/24 1645)  SpO2: 100 % (01/02/24 1550) Vital Signs (24h Range):  Temp:  [97.9 °F (36.6 °C)-99.1 °F (37.3 °C)] 98.1 °F (36.7 °C)  Pulse:  [] 93  Resp:  [16-18] 18  SpO2:  [94 %-100 %] 100 %  BP: ()/(0-82) 91/65     Weight: 82.1 kg (181 lb)  Body mass index is 24.55 kg/m².     Physical Exam  Constitutional:       Appearance: Normal appearance.   Skin:     General: Skin is warm and dry.      Findings: Lesion present.   Neurological:      Mental Status: He is alert.          Laboratory:  All pertinent labs reviewed within the last 24 hours.    Diagnostic Results:  None    Assessment/Plan:         ENRRIQUE Skin Integrity Evaluation      Skin Integrity ENRRIQUE evaluation of patient as part of the comprehensive skin care team.     He has been admitted for 50 days. Skin injury was noted on 12/15/23. POA no.    Sacrum      R heel          Orthopedic  Blood blister  - blood filled blister  to right heel.  - skin remains intact.  - continue BPCO bid/prn.  - heel foams intact.  - recommend heel protector boots also.    Deep tissue injury  - follow up evaluation of skin injury.  - pt presented for sob. S/p LVAD placement 12/1.  - deep tissue injury to sacrum that has revealed a partial thickness tissue loss with pink and yellow wound base and darkened periwound. Friction and shearing complicating wound as well.  - continue Triad bid/prn.   - Immerse surface.  - turn q2h.  - wedge for offloading.  - nursing to maintain pressure injury prevention measures and continue wound care per orders.        Dayanna Crabtree NP  Skin Integrity ENRRIQUE  [facility] - Cardiology Stepdown

## 2024-01-02 NOTE — CARE UPDATE
-Glucose Goal 140-180    -A1C:   Hemoglobin A1C   Date Value Ref Range Status   10/12/2023 7.6 (H) 4.0 - 5.6 % Final     Comment:     ADA Screening Guidelines:  5.7-6.4%  Consistent with prediabetes  >or=6.5%  Consistent with diabetes    High levels of fetal hemoglobin interfere with the HbA1C  assay. Heterozygous hemoglobin variants (HbS, HgC, etc)do  not significantly interfere with this assay.   However, presence of multiple variants may affect accuracy.           -HOME REGIMEN: metformin     -GLUCOSE TREND FOR THE PAST 24HRS:   Recent Labs   Lab 01/01/24  1151 01/01/24  1714 01/01/24  1955 01/02/24  0019 01/02/24  0430 01/02/24  0825   POCTGLUCOSE 182* 161* 132* 146* 151* 155*           -NO HYPOGYCEMIAS NOTED     - Diet  Diet NPO Except for: Medication (per NG tube)    -Steroids - none     -Tube Feeds - TF @ 45ml/hr         Plan:   - Novolog 4 units q 4 hr to cover TF- hold if TF held or BG < 100   - LDC (150/50) prn q4hr   - POCT Glucose every 4 hours  - Hypoglycemia protocol in place      ** Please notify Endocrine for any change and/or advance in diet**  ** Please call Endocrine for any BG related issues **     Discharge Planning:   TBD. Please notify endocrinology prior to discharge.

## 2024-01-02 NOTE — PROGRESS NOTES
[facility] - [facility] Step[facility]  Heart Transplant  Progress Note    Patient Name: Radha Abbott  MRN: [id number]  Admission Date: 11/9/2023  Hospital Length of Stay: 54 days  Attending Physician: Brayan Ocampo MD  Primary Care Provider: Vasu Kong MD  Principal Problem:LVAD (left ventricular assist device) present    Subjective:   Interval History: No complaints or overnight events. Getting HD today. Struggling with swallowing assessments, due to get modified barium swallow tomorrow.     Continuous Infusions:   sodium chloride 0.9%      dextrose 10 % in water (D10W)       Scheduled Meds:   sodium chloride 0.9%   Intravenous Once    acetaminophen  1,000 mg Per NG tube TID    amiodarone  400 mg Per NG tube Daily    balsam peru-castor oiL   Topical (Top) BID    famotidine  20 mg Per G Tube Daily    gabapentin  125 mg Per NG tube Q12H    insulin aspart U-100  4 Units Subcutaneous 6 times per day    polyethylene glycol  17 g Per NG tube Daily    psyllium husk (aspartame)  1 packet Per NG tube Daily    senna-docusate 8.6-50 mg  2 tablet Per NG tube Daily    traZODone  50 mg Per NG tube QHS    venlafaxine  37.5 mg Per G Tube Daily    warfarin  2.5 mg Per G Tube Every Mon, Wed, Fri    warfarin  5 mg Per G Tube Every Tues, Thurs, Sat, Sun     PRN Meds:bisacodyL, dextrose 10 % in water (D10W), dextrose 10%, dextrose 10%, glucagon (human recombinant), heparin (porcine), hydrALAZINE, insulin aspart U-100, ondansetron, Flushing PICC/Midline Protocol **AND** [DISCONTINUED] sodium chloride 0.9% **AND** sodium chloride 0.9%, traMADoL, zolpidem    Review of patient's allergies indicates:  No Known Allergies  Objective:     Vital Signs (Most Recent):  Temp: 98.2 °F (36.8 °C) (01/02/24 0812)  Pulse: 95 (01/02/24 0812)  Resp: 16 (01/02/24 0812)  BP: (!) 78/0 (01/02/24 0818)  SpO2: 100 % (01/02/24 0812) Vital Signs (24h Range):  Temp:  [97.9 °F (36.6 °C)-99.1 °F (37.3 °C)] 98.2 °F (36.8 °C)  Pulse:  [60-97] 95  Resp:  [16-17]  16  SpO2:  [94 %-100 %] 100 %  BP: ()/(0-82) 78/0     No data found.  Body mass index is 24.55 kg/m².      Intake/Output Summary (Last 24 hours) at 1/2/2024 1056  Last data filed at 1/2/2024 0439  Gross per 24 hour   Intake 921 ml   Output 0 ml   Net 921 ml         Hemodynamic Parameters:  CVP:  [7 mmHg] 7 mmHg    Telemetry: SR       Physical Exam  Constitutional:       Comments: Cachectic, temporal wasting   HENT:      Head: Normocephalic and atraumatic.   Eyes:      Conjunctiva/sclera: Conjunctivae normal.      Pupils: Pupils are equal, round, and reactive to light.   Neck:      Comments: Do not appreciate elevated JVP  Cardiovascular:      Rate and Rhythm: Normal rate and regular rhythm.      Comments: Smooth VAD hum  Pulmonary:      Effort: Pulmonary effort is normal.      Breath sounds: Normal breath sounds.   Abdominal:      General: Bowel sounds are normal.      Palpations: Abdomen is soft.   Musculoskeletal:         General: No swelling. Normal range of motion.      Cervical back: Normal range of motion and neck supple.   Skin:     General: Skin is warm and dry.      Capillary Refill: Capillary refill takes 2 to 3 seconds.   Neurological:      General: No focal deficit present.      Mental Status: He is alert and oriented to person, place, and time.            Significant Labs:  CBC:  Recent Labs   Lab 12/31/23 0426 01/01/24 0412 01/02/24 0431   WBC 7.92 7.35 7.39   RBC 2.44* 2.42* 2.41*   HGB 7.0* 6.9* 7.0*   HCT 22.4* 21.8* 22.4*   * 409 450   MCV 92 90 93   MCH 28.7 28.5 29.0   MCHC 31.3* 31.7* 31.3*       BNP:  Recent Labs   Lab 12/27/23  0450 12/29/23 0406 01/01/24 0412   BNP 1,779* 2,177* 794*       CMP:  Recent Labs   Lab 12/31/23 0426 01/01/24 0412 01/02/24 0431   * 152* 140*   CALCIUM 8.0* 8.3* 8.6*   ALBUMIN 1.9* 1.9* 1.9*   PROT 6.7 6.6 6.8   * 133* 134*   K 3.4* 3.6 4.1   CO2 27 25 26   CL 98 97 97   BUN 36* 52* 78*   CREATININE 3.1* 4.5* 5.2*   ALKPHOS 179* 159*  175*   ALT 13 17 18   AST 37 36 36   BILITOT 0.4 0.4 0.3        Coagulation:   Recent Labs   Lab 12/31/23  0426 01/01/24  0412 01/02/24  0431   INR 2.4* 2.1* 2.2*       LDH:  Recent Labs   Lab 12/31/23  0426 01/01/24  0412 01/02/24  0431   * 353* 351*       Microbiology:  Microbiology Results (last 7 days)       ** No results found for the last 168 hours. **            I have reviewed all pertinent labs within the past 24 hours.    Estimated Creatinine Clearance: 16.4 mL/min (A) (based on SCr of 5.2 mg/dL (H)).    Diagnostic Results:  I have reviewed and interpreted all pertinent imaging results/findings within the past 24 hours.  Assessment and Plan:     63 year old male with hx of CAD s/p 3v CABG (unclear anatomy, 2009), ICM with a recent EF of 15-20% s/p ICD (medtronik 2009), DM2 (a1c 7.7), HTN, HLD, Vfib on amio who presents to the ED with CC of SOB     Pt was recently admitted to [facility] as a transfer.  He was started on a Lasix gtt and did well.  Started on gDMT and discharged home on  5 with plans to follow up in HTS clinic for ongoing transplant evaluation at another facility.  He says that about a few days ago he started to notice LE swelling.  This turned into Nelson and orthopnea.  He says he can't walk to the bathroom without getting SOB.  He also complains of weight gain.  He takes torsemide 20mg BID at home and was told to trial additional Lasix which he did without any improvement.  He was rx metolazone but has not been filled.  He came to the ED     In the ED he was AF with stable VS on RA.  CBC showing chronic anemia.  CMP notable for acute on chronic CKD with baseline around 2.1 and Cr now 2.6.  BNP elevated.  Lactate neg.  CXR showing pulmonary edema.  I evaluated the pt at the bedside.  Bedside TTE showing CVP >15.  He was subsequently admitted to the CCU for diuresis.     He was continued on his home  and was started on a lasix gtt, which he responded well to overnight (net -1700cc. He  feels much better this morning as well. Our transplant coordinators have been working on insurance approval and he is now being transferred to hospitals service for transplant evaluation.     * LVAD (left ventricular assist device) present  -HeartMate 3 Implanted  12/1/2023  as DT  -HTS Primary  -Implanted by Dr. Washington  -Continue Coumadin, dosing by PharmD.  Goal INR 2.0-3.0 . Therapeutic today.   -Antiplatelets Not on ASA  -LDH is stable overall today. Will continue to monitor daily.  -Weaned off midodrine.  -Speed set at  4900   rpm, LSL 4500 rpm   -Interrogation notable for no events  -ECHO 12/11 AV does not open, IVS midline, LVEDD 6.1, with HM3 speed set to 4900 rpm. Will repeat this week.   -Not listed for OHTx, declined for OHTX due to comorbidities   -PT/OT/Speech. Recommending rehab but HD+LVAD may be barrier.       Procedure: Device Interrogation Including analysis of device parameters  Current Settings: Ventricular Assist Device  Review of device function is stable/unstable stable        1/2/2024     8:11 AM 1/2/2024     4:20 AM 1/2/2024    12:14 AM 1/1/2024     8:13 PM 1/1/2024     5:13 PM 1/1/2024    11:45 AM 1/1/2024     7:36 AM   TXP LVAD INTERROGATIONS   Type HeartMate3 HeartMate3 HeartMate3 HeartMate3 HeartMate3 HeartMate3 HeartMate3   Flow 3.8 3.7 3.9 4 4 4.1 4.1   Speed 4900 4900 4900 4900 4900 4900 4900   PI 5.1 5.8 4.5 4.6 4.2 4 3.7   Power ([place]) 3.2 3.4 3.2 3.2 3.3 3.2 3.2   LSL 4500   4500 4500 4500 4500   Low Flow Alarm no    no no no   High Power Alarm no    no no no   Pulsatility Intermittent pulse   Intermittent pulse Intermittent pulse Intermittent pulse Intermittent pulse         Unilateral complete vocal fold paralysis  -Appreciate ENT's help  -S/P augmentation of paralyzed left vocal cord 12/18    Dysphonia  -Speech following closely  -Appreciate ENT's help. S/P augmentation of paralyzed left vocal cord 12/18    Moderate malnutrition  -RD and SLP following  -Tolerating tube feeds. Total  daily caloric intake increased to 2160  -Failed MBBS 12/20, continue TF.  -Speech following. Plan to repeat swallow study this week.     Depressed mood  -Psychiatry consulted for depressed mood, SI when left alone  -Recommend sitter when alone (possibly with transition to stepdown).  -Cont Venlafaxine    IVÁN (acute kidney injury)  -Nephrology following      Acute on chronic combined systolic and diastolic CHF, NYHA class 4  Pt with known ICM with an EF of 25% on home  presented with decompensated HF.    -S/P LVAD    PAF (paroxysmal atrial fibrillation)  Known hx of pAF. In sinus rhythm on admission, but 1 run of AF RVR overnight. He spontaneously converted.  - Continue home amiodarone 400mg qd  - Continue AC      Acute renal failure with acute tubular necrosis superimposed on stage 3b chronic kidney disease  IVÁN on CKD2. Baseline Cr of 1.7-2.0.   - Hold ARNi  -Trend BMPs daily   -Nephrology following   -SLED/SCUF for volume removal began on 12/1. He is anuric  -Did not respond to diuretic challenge with 240 mg IV Lasix, 1000 mg Diuril, and 500 mg IV Diamox done 12/14  -Now on HD  -Tunneled trialysis line placed 12/22 by IR    Type 2 diabetes mellitus without complication, without long-term current use of insulin  -A1c 7.6, not insulin dependent  - Endocrine following, appreciate assistance with blood glucose management    CAD (coronary artery disease)  -S/p 3vCABG in 2009  -Lipitor on hold 2/2 mild transaminitis  -Not on ASA post VAD    Ventricular tachycardia  Hx VT s/p ICD placement (medtronic 2009)  - Continue home amio 400mg qd        Jimy An PA-C  Heart Transplant  [facility] - Cardiology Step[facility]

## 2024-01-02 NOTE — PROGRESS NOTES
[facility] - [facility] Stepdown  Nephrology  Progress Note    Patient Name: Radha Abbott  MRN: [id number]  Admission Date: 11/9/2023  Hospital Length of Stay: 54 days  Attending Provider: Brayan Ocampo MD   Primary Care Physician: Vasu Kong MD  Principal Problem:LVAD (left ventricular assist device) present    Subjective:     Interval History:   No HD yesterday. Electrolytes stable. Net positive 1068 mL/24 hrs. No distress on exam.     Review of patient's allergies indicates:  No Known Allergies  Current Facility-Administered Medications   Medication Frequency    0.9%  NaCl infusion Continuous    0.9%  NaCl infusion Once    acetaminophen tablet 1,000 mg TID    amiodarone tablet 400 mg Daily    balsam peru-castor oiL Oint BID    bisacodyL suppository 10 mg Daily PRN    dextrose 10 % infusion Continuous PRN    dextrose 10% bolus 125 mL 125 mL PRN    dextrose 10% bolus 250 mL 250 mL PRN    famotidine tablet 20 mg Daily    gabapentin 250 mg/5 mL (5 mL) solution 125 mg Q12H    glucagon (human recombinant) injection 1 mg PRN    heparin (porcine) injection 1,000 Units PRN    hydrALAZINE injection 10 mg Q6H PRN    insulin aspart U-100 pen 0-5 Units Q4H PRN    insulin aspart U-100 pen 4 Units 6 times per day    ondansetron injection 4 mg Q6H PRN    polyethylene glycol packet 17 g Daily    psyllium husk (aspartame) 3.4 gram packet 1 packet Daily    senna-docusate 8.6-50 mg per tablet 2 tablet Daily    sodium chloride 0.9% flush 10 mL PRN    traMADoL tablet 50 mg Q8H PRN    traZODone tablet 50 mg QHS    venlafaxine tablet 37.5 mg Daily    warfarin (COUMADIN) tablet 2.5 mg Every Mon, Wed, Fri    warfarin (COUMADIN) tablet 5 mg Every Tues, Thurs, Sat, Sun    zolpidem tablet 5 mg Nightly PRN       Objective:     Vital Signs (Most Recent):  Temp: 98 °F (36.7 °C) (01/02/24 1136)  Pulse: 83 (01/02/24 1136)  Resp: 18 (01/02/24 1136)  BP: (!) 78/0 (01/02/24 0818)  SpO2: 95 % (01/02/24 1136) Vital Signs (24h Range):  Temp:   [97.9 °F (36.6 °C)-99.1 °F (37.3 °C)] 98 °F (36.7 °C)  Pulse:  [] 83  Resp:  [16-18] 18  SpO2:  [94 %-100 %] 95 %  BP: ()/(0-82) 78/0     Weight:  (bed weight no able to be obtained) (12/30/23 0722)  Body mass index is 24.55 kg/m².  Body surface area is 2.04 meters squared.    I/O last 3 completed shifts:  In: 1625 [P.O.:120; NG/GT:1505]  Out: 50 [Urine:50]     Physical Exam  Vitals and nursing note reviewed.   HENT:      Head: Normocephalic and atraumatic.      Mouth/Throat:      Mouth: Mucous membranes are dry.   Eyes:      Conjunctiva/sclera: Conjunctivae normal.   Cardiovascular:      Comments: LVAD   Pulmonary:      Effort: Pulmonary effort is normal.      Comments: RA  Abdominal:      Palpations: Abdomen is soft.   Musculoskeletal:         General: No swelling.      Right lower leg: No edema.      Left lower leg: No edema.   Skin:     General: Skin is warm.   Neurological:      Mental Status: He is alert and oriented to person, place, and time.   Psychiatric:         Mood and Affect: Mood normal.         Behavior: Behavior normal.          Significant Labs:  CBC:   Recent Labs   Lab 01/02/24  0431   WBC 7.39   RBC 2.41*   HGB 7.0*   HCT 22.4*      MCV 93   MCH 29.0   MCHC 31.3*     CMP:   Recent Labs   Lab 01/02/24  0431   *   CALCIUM 8.6*   ALBUMIN 1.9*   PROT 6.8   *   K 4.1   CO2 26   CL 97   BUN 78*   CREATININE 5.2*   ALKPHOS 175*   ALT 18   AST 36   BILITOT 0.3     All labs within the past 24 hours have been reviewed.     Assessment/Plan:     Cardiac/Vascular  * LVAD (left ventricular assist device) present  -management per primary     Acute on chronic combined systolic and diastolic CHF, NYHA class 4  - management per primary team  - LVAD for mechanical support    Renal/  Acute renal failure with acute tubular necrosis superimposed on stage 3b chronic kidney disease  - suspect acute tubular injury secondary to hypotension/cardiogenic shock likely compounded by  intra-arterial contrast and anemia with urinary sediment with granular casts    -Will plan for iHD today for clearance and volume management. Target UF 1-2 L as tolerated.   -Will continue TuThSat HD while inpatient.   -Continue bladder scans q shift  -strict I/O's  -renal diet/tube feeds when not NPO, volume restriction per primary team  -renally all dose medications to eGFR  -avoid nephrotoxic agents wean feasible (i.e. NSAIDs, intra-arterial contrast, supra-therapeutic vancomycin levels, etc.)          Thank you for your consult. I will follow-up with patient. Please contact us if you have any additional questions.    Matilda Hinton DNP, FNP-C  Nephrology  [facility] - Cardiology Stepdown

## 2024-01-02 NOTE — PLAN OF CARE
Recommendations    1.) Recommend continuing TF of Novasource Renal to goal rate of 45ml/hr to provide 2160 kcal, 98g PRO, and 774ml FF- FWF per MD (100% of EEN/EPN)  - this is w/in the NIH guidelines for cachexia.                 - monitor for s/s of intolerance such as residuals >500ml, n/v/d, or abdominal distension.                    - If increase TF rate warranted increase goal rate to Novasource Renal @ 50ml/hr to provide 2400 kcals, 109 g PRO, 860 ml fluid      2.  Continue BenePRO BID if CRRT continues (PRO needs will be higher), provides 12g PRO    3. Continue fiber for diarrhea as needed     4. RD to monitor wt, tolerance, skin, labs.     Goals: Meet % of EEN/EPN by RD f/u date  Nutrition Goal Status: goal met  Communication of RD Recs:  (POC)

## 2024-01-02 NOTE — SUBJECTIVE & OBJECTIVE
Interval History:   No HD yesterday. Electrolytes stable. Net positive 1068 mL/24 hrs. No distress on exam.     Review of patient's allergies indicates:  No Known Allergies  Current Facility-Administered Medications   Medication Frequency    0.9%  NaCl infusion Continuous    0.9%  NaCl infusion Once    acetaminophen tablet 1,000 mg TID    amiodarone tablet 400 mg Daily    balsam peru-castor oiL Oint BID    bisacodyL suppository 10 mg Daily PRN    dextrose 10 % infusion Continuous PRN    dextrose 10% bolus 125 mL 125 mL PRN    dextrose 10% bolus 250 mL 250 mL PRN    famotidine tablet 20 mg Daily    gabapentin 250 mg/5 mL (5 mL) solution 125 mg Q12H    glucagon (human recombinant) injection 1 mg PRN    heparin (porcine) injection 1,000 Units PRN    hydrALAZINE injection 10 mg Q6H PRN    insulin aspart U-100 pen 0-5 Units Q4H PRN    insulin aspart U-100 pen 4 Units 6 times per day    ondansetron injection 4 mg Q6H PRN    polyethylene glycol packet 17 g Daily    psyllium husk (aspartame) 3.4 gram packet 1 packet Daily    senna-docusate 8.6-50 mg per tablet 2 tablet Daily    sodium chloride 0.9% flush 10 mL PRN    traMADoL tablet 50 mg Q8H PRN    traZODone tablet 50 mg QHS    venlafaxine tablet 37.5 mg Daily    warfarin (COUMADIN) tablet 2.5 mg Every Mon, Wed, Fri    warfarin (COUMADIN) tablet 5 mg Every Tues, Thurs, Sat, Sun    zolpidem tablet 5 mg Nightly PRN       Objective:     Vital Signs (Most Recent):  Temp: 98 °F (36.7 °C) (01/02/24 1136)  Pulse: 83 (01/02/24 1136)  Resp: 18 (01/02/24 1136)  BP: (!) 78/0 (01/02/24 0818)  SpO2: 95 % (01/02/24 1136) Vital Signs (24h Range):  Temp:  [97.9 °F (36.6 °C)-99.1 °F (37.3 °C)] 98 °F (36.7 °C)  Pulse:  [] 83  Resp:  [16-18] 18  SpO2:  [94 %-100 %] 95 %  BP: ()/(0-82) 78/0     Weight:  (bed weight no able to be obtained) (12/30/23 6307)  Body mass index is 24.55 kg/m².  Body surface area is 2.04 meters squared.    I/O last 3 completed shifts:  In: 1625 [P.O.:120;  NG/GT:1505]  Out: 50 [Urine:50]     Physical Exam  Vitals and nursing note reviewed.   HENT:      Head: Normocephalic and atraumatic.      Mouth/Throat:      Mouth: Mucous membranes are dry.   Eyes:      Conjunctiva/sclera: Conjunctivae normal.   Cardiovascular:      Comments: LVAD   Pulmonary:      Effort: Pulmonary effort is normal.      Comments: RA  Abdominal:      Palpations: Abdomen is soft.   Musculoskeletal:         General: No swelling.      Right lower leg: No edema.      Left lower leg: No edema.   Skin:     General: Skin is warm.   Neurological:      Mental Status: He is alert and oriented to person, place, and time.   Psychiatric:         Mood and Affect: Mood normal.         Behavior: Behavior normal.          Significant Labs:  CBC:   Recent Labs   Lab 01/02/24  0431   WBC 7.39   RBC 2.41*   HGB 7.0*   HCT 22.4*      MCV 93   MCH 29.0   MCHC 31.3*     CMP:   Recent Labs   Lab 01/02/24  0431   *   CALCIUM 8.6*   ALBUMIN 1.9*   PROT 6.8   *   K 4.1   CO2 26   CL 97   BUN 78*   CREATININE 5.2*   ALKPHOS 175*   ALT 18   AST 36   BILITOT 0.3     All labs within the past 24 hours have been reviewed.

## 2024-01-02 NOTE — PROGRESS NOTES
01/02/2024  Deni Frye    Current provider:  Brayan Ocampo MD    Device interrogation:      1/2/2024     4:20 AM 1/2/2024    12:14 AM 1/1/2024     8:13 PM 1/1/2024     5:13 PM 1/1/2024    11:45 AM 1/1/2024     7:36 AM 1/1/2024     4:03 AM   TXP LVAD INTERROGATIONS   Type HeartMate3 HeartMate3 HeartMate3 HeartMate3 HeartMate3 HeartMate3 HeartMate3   Flow 3.7 3.9 4 4 4.1 4.1 3.9   Speed 4900 4900 4900 4900 4900 4900 4900   PI 5.8 4.5 4.6 4.2 4 3.7 4.5   Power ([place]) 3.4 3.2 3.2 3.3 3.2 3.2 3.2   LSL   4500 4500 4500 4500    Low Flow Alarm    no no no    High Power Alarm    no no no    Pulsatility   Intermittent pulse Intermittent pulse Intermittent pulse Intermittent pulse Intermittent pulse          Rounded on [facility] to ensure all mechanical assist device settings (IABP or VAD) were appropriate and all parameters were within limits.  I was able to ensure all back up equipment was present, the staff had no issues, and the Perfusion Department daily rounding was complete.      For implantable VADs: Interrogation of Ventricular assist device was performed with analysis of device parameters and review of device function. I have personally reviewed the interrogation findings and agree with findings as stated.     In emergency, the nursing units have been notified to contact the perfusion department either by:  Calling [phone] from 630am to 4pm Mon thru Fri, utilizing the On-Call Finder functionality of Epic and searching for Perfusion, or by contacting the hospital  from 4pm to 630am and on weekends and asking to speak with the perfusionist on call.    7:38 AM

## 2024-01-02 NOTE — PT/OT/SLP PROGRESS
Occupational Therapy   Co-Treatment    Name: Radha Abbott  MRN: [id number]  Admitting Diagnosis:  LVAD (left ventricular assist device) present  29 Days Post-Op    Recommendations:     Discharge Recommendations: High Intensity Therapy  Discharge Equipment Recommendations:  to be determined by next level of care  Barriers to discharge:  None    Assessment:     Radha Abbott is a 63 y.o. male with a medical diagnosis of LVAD (left ventricular assist device) present.  Pt presents with decreased endurance and impaired mobility performance as limited by cardiovascular status and generalized weakness. Pt found upright in bed and agreeable for therapy today. Session focused on switching LVAD wall power->battery and fx'l mobility to simulate household distance gait training. Pt limited today 2/2 quick fatigue at EOB after sitting upright ~15 min (needing SBA-max A). Pt did complete mobility in hallway with close chair follow needed and using a platform walker. Pt continues to require x2 person A for t/f and currently is a high fall risk. Patient has demonstrated sufficient progression to warrant high intensity therapy evidenced by objectives noted below.   Performance deficits affecting function are weakness, gait instability, impaired endurance, impaired balance, impaired self care skills, impaired functional mobility, impaired cardiopulmonary response to activity, decreased safety awareness, decreased lower extremity function, decreased upper extremity function, decreased ROM, impaired cognition, pain, impaired fine motor.     Rehab Prognosis:  Good; patient would benefit from acute skilled OT services to address these deficits and reach maximum level of function.       Plan:     Patient to be seen 6 x/week to address the above listed problems via self-care/home management, therapeutic activities, therapeutic groups, neuromuscular re-education  Plan of Care Expires: 01/02/24  Plan of Care Reviewed with: patient,  spouse    Subjective     Chief Complaint: fatigue with mobility  Patient/Family Comments/goals: 'I'll try'- mobility in hallway  Pain/Comfort:  Pain Rating 1: 0/10  Pain Rating Post-Intervention 1: 0/10  Pain Rating Post-Intervention 2: 0/10    Objective:   Co-treatment with PT for maximal pt participation, safety, and activity tolerance     Communicated with: RN prior to session.  Patient found HOB elevated with telemetry, NG tube, LVAD upon OT entry to room.    General Precautions: Standard, fall, LVAD, sternal    Orthopedic Precautions:N/A  Braces: N/A  Respiratory Status: Room air     Occupational Performance:     Bed Mobility:    Patient completed Rolling/Turning to Right with minimum assistance  Patient completed Scooting/Bridging with minimum assistance  Patient completed Supine to Sit with minimum assistance     Functional Mobility/Transfers:  Patient completed Sit <> Stand Transfer with mod assistance and of 2 persons  with  hand-held assist   Patient completed Bed <> Chair Transfer using Step Transfer technique with maximal assistance and of 2 persons with hand-held assist  Functional Mobility: Pt initially completed squat pivot from bed to chair with max A x2. Pt stood from chair with max A x2, walkikng ~28ft with platform walker and close chair follow with spouse A. Pt needed CGA-min A with platform walker and mod cues for posturing. Pt needed seated break. On second stand from chair, pt completed from chair to cardiac chair with mod A x2 needed. Emergency bag present.   Pt sat EOB ~15 min requiring SBA-MAX A to complete switching from LVAD wall->battery     Activities of Daily Living:  Lower Body Dressing: total assistance donning  socks      AMPAC 6 Click ADL: 12    Treatment & Education:  Pt educated on role of occupational therapy, POC, and safety during ADLs and functional mobility. Pt and OT discussed importance of safe, continued mobility to optimize daily living skills. Pt verbalized  understanding.   Pt completed the following during session:  -pt already completed self test and A from spouse to complete LVAD binder. Pt needed min A to switch from LVAD wall->battery given FM deficits.  White board updated during session. Pt given instruction to call for medical staff/nurse for assistance.       Patient left  up in cardiac chair   with all lines intact, call button in reach, RN notified, and spouse present    GOALS:   Multidisciplinary Problems       Occupational Therapy Goals          Problem: Occupational Therapy    Goal Priority Disciplines Outcome Interventions   Occupational Therapy Goal     OT, PT/OT Ongoing, Progressing    Description: Goals to be met by: 1/2/24     Patient will increase functional independence with ADLs by performing:    UB Dressing with SBA  LE Dressing with Supervision.  Grooming while standing at sink with Supervision.  Toileting from bedside commode with SBA for hygiene and clothing management.   Step transfer to bedside commode with SBA  [place] with VAD management during ADLs                             Time Tracking:     OT Date of Treatment: 01/02/24  OT Start Time: 0924  OT Stop Time: 1004  OT Total Time (min): 40 min    Billable Minutes:Self Care/Home Management 25 min  Therapeutic Activity 15 min    OT/PRIETO: OT     Number of PRIETO visits since last OT visit: 0    1/2/2024

## 2024-01-02 NOTE — ASSESSMENT & PLAN NOTE
-HeartMate 3 Implanted  12/1/2023  as DT  -HTS Primary  -Implanted by Dr. Washington  -Continue Coumadin, dosing by PharmD.  Goal INR 2.0-3.0 . Therapeutic today.   -Antiplatelets Not on ASA  -LDH is stable overall today. Will continue to monitor daily.  -Weaned off midodrine.  -Speed set at  4900   rpm, LSL 4500 rpm   -Interrogation notable for no events  -ECHO 12/11 AV does not open, IVS midline, LVEDD 6.1, with HM3 speed set to 4900 rpm. Will repeat this week.   -Not listed for OHTx, declined for OHTX due to comorbidities   -PT/OT/Speech. Recommending rehab but HD+LVAD may be barrier.       Procedure: Device Interrogation Including analysis of device parameters  Current Settings: Ventricular Assist Device  Review of device function is stable/unstable stable        1/2/2024     8:11 AM 1/2/2024     4:20 AM 1/2/2024    12:14 AM 1/1/2024     8:13 PM 1/1/2024     5:13 PM 1/1/2024    11:45 AM 1/1/2024     7:36 AM   TXP LVAD INTERROGATIONS   Type HeartMate3 HeartMate3 HeartMate3 HeartMate3 HeartMate3 HeartMate3 HeartMate3   Flow 3.8 3.7 3.9 4 4 4.1 4.1   Speed 4900 4900 4900 4900 4900 4900 4900   PI 5.1 5.8 4.5 4.6 4.2 4 3.7   Power ([place]) 3.2 3.4 3.2 3.2 3.3 3.2 3.2   LSL 4500   4500 4500 4500 4500   Low Flow Alarm no    no no no   High Power Alarm no    no no no   Pulsatility Intermittent pulse   Intermittent pulse Intermittent pulse Intermittent pulse Intermittent pulse

## 2024-01-02 NOTE — NURSING
Plan of care discussed with patient. Patient and family educated on fall risk; patient remained free from falls. Educated patient and family on IS use, VAD alarms, and Warfarin use. LVAD DP and numbers WNL, smooth LVAD hum. Patient has no complaints of pain. Discussed medications and care. Patient has no questions at this time.

## 2024-01-02 NOTE — PT/OT/SLP PROGRESS
Physical Therapy Co-Treatment    Patient Name:  Radha Abbott   MRN:  [id number]  Admitting Diagnosis:  LVAD (left ventricular assist device) present   Recent Surgery: Procedure(s) (LRB):  CLOSURE, WOUND, STERNUM (N/A)  INSERTION, GRAFT, PERICARDIUM  DRAINAGE, PLEURAL EFFUSION 29 Days Post-Op  Admit Date: 11/9/2023  Length of Stay: 54 days    Recommendations:     Discharge Recommendations:    High Intensity Therapy   Discharge Equipment Recommendations: to be determined by next level of care   Barriers to discharge: Decreased caregiver support and increased need for assistance    Appropriate transfer level with nursing staff: Drawsheet t/f to cardiac chair    Plan:     During this hospitalization, patient to be seen 6 x/week to address the identified rehab impairments via gait training, therapeutic activities, therapeutic exercises, neuromuscular re-education and progress towards the established goals.  Plan of Care Expires:  02/05/24  Plan of Care Reviewed with: patient, spouse    Assessment:     Radha Abbott is a 63 y.o. male admitted with a medical diagnosis of LVAD (left ventricular assist device) present. Pt demo'd improvements this session as he increased gait distance and required decrease level of assistance for gait. Pt still requiring increased assistance to complete sit to stand transfer with fatigued quickly while sitting EOB to complete LVAD wall<>battery switch resulting in need for increased assistance for balance. Patient would benefit from skilled therapy services to maximize safety and independence, increase activity tolerance, decrease fall risk, decrease caregiver burden, improve QOL, improve patient's functional mobility, and decrease risk of contractures and pressure sores. Patient has demonstrated sufficient progression to warrant high intensity therapy evidenced by objectives noted below.     Problem List: weakness, impaired endurance, impaired self care skills, gait instability, impaired  functional mobility, impaired balance, decreased coordination, decreased upper extremity function, decreased lower extremity function, decreased safety awareness, impaired cardiopulmonary response to activity, decreased ROM, impaired fine motor, orthopedic precautions.  Rehab Prognosis: Good; patient would benefit from acute skilled PT services to address these deficits and reach maximum level of function.      Goals:   Multidisciplinary Problems       Physical Therapy Goals          Problem: Physical Therapy    Goal Priority Disciplines Outcome Goal Variances Interventions   Physical Therapy Goal     PT, PT/OT Ongoing, Progressing     Description: Goals to be met by: 23     Patient will increase functional independence with mobility by performin. Sit to stand transfer with modified independence  2. Bed to chair transfer with supervision  3. Gait  x 150 feet with supervision using physician approved AD with standing rest breaks prn.   4. Lower extremity exercise program x30 reps per handout, with independence  5. Recite 3/3 sternal precautions and remain complaint with precautions throughout session with no verbal cues                     Multidisciplinary Problems (Resolved)          Problem: Physical Therapy    Goal Priority Disciplines Outcome Goal Variances Interventions   Physical Therapy Goal   (Resolved)     PT, PT/OT Met     Description: Goals to be met by: 23     Patient will increase functional independence with mobility by performin. Evaluate pt's need for physical therapy.                          Subjective     RN notified prior to session. Wife present upon PT entrance into room. Patient agreeable to PT treatment session.    Chief Complaint: weakness  Patient/Family Comments/goals: increase mobility   Pain/Comfort:  Pain Rating 1: 0/10  Pain Rating Post-Intervention 1: 0/10      Objective:     Additional staff present: OT; OT for cotx due to pt's multiple medical comorbidities  and functional deficits req'ing two skilled therapists to appropriately maximize functional potential by progressing pt's musculoskeletal strength and endurance, facilitating neuromuscular postural balance and control ,and accommodating for patient's impaired cardiopulmonary tolerance to activities.     Patient found HOB elevated with: telemetry, NG tube, LVAD   Cognition:   Alert, Cooperative, and Flat affect  Patient is oriented to Person, Place, Time, Situation  General Precautions: Standard, Cardiac fall, LVAD, sternal   Orthopedic Precautions:N/A   Braces: N/A   Body mass index is 24.55 kg/m².  Oxygen Device: Room Air  Vitals: BP (!) 78/0 (BP Location: Left arm, Patient Position: Lying)   Pulse 93   Temp 98 °F (36.7 °C) (Oral)   Resp 18   Ht 6' (1.829 m)   Wt 82.1 kg (181 lb)   SpO2 95%   BMI 24.55 kg/m²     Outcome Measures:  AM-PAC 6 CLICK MOBILITY  Turning over in bed (including adjusting bedclothes, sheets and blankets)?: 3  Sitting down on and standing up from a chair with arms (e.g., wheelchair, bedside commode, etc.): 2  Moving from lying on back to sitting on the side of the bed?: 3  Moving to and from a bed to a chair (including a wheelchair)?: 2  Need to walk in hospital room?: 3  Climbing 3-5 steps with a railing?: 1  Basic Mobility Total Score: 14     Functional Mobility:    Bed Mobility:   Supine > Sit with minimum assistance    Transfers:   Sit <> Stand Transfer: x1 trial from chair max x2 persons B PRW, x1 trial from chair mod x2 persons B PRW  Bed <> Chair Transfer: Squat Pivot technique with maximal assistance and of 2 persons with no assistive device    Balance:  Sitting Balance  Static: stand by assistance  Dynamic: maximal assistance  Time: 10 minutes EOB  Comment: Pt required increased assistance with fatigue and demo'd L lateral and posterior that increased with fatigue. Pt required TC/VC for midline orientation, scpaular retraction, and cervical and thoracic extension.    Standing:  Static: minimum assistance  Dynamic: minimum assistance      Gait:  Patient ambulated: 28' (declined further gait trials 2/2 fatigue)   Patient required: CGA regressing to min A  Patient used:  platform walker   Gait Deviation(s): occasional unsteady gait, decreased step length, narrow base of support, decreased weight shift, flexed posture, decreased gina, and severe fwd lean on PRW  all lines remained intact throughout ambulation trial  Chair follow for patient safety  LVAD: Consolidation bag utilized   LVAD: Emergency bag present throughout ambulation trial out of room  Comments: Patient required initial cueing for upright stance and glut extension. Pt with fwd lean onto PRW that worsened with fatigue and with increased cervical flexion with fatigue as well that required max VC/TC for upright stance and fwd gaze. Pt with hyperextension of RLE during steps so VC to decreased knee extension during trial. Pt required mod cueing for step length and to increase [place] during trial.     Education:  Time provided for education, counseling and discussion of health disposition in regards to patient's current status  All questions answered within PT scope of practice and to patient's satisfaction  PT role in POC to address current functional deficits  Pt educated on proper body mechanics, safety techniques, and energy conservation with PT facilitation and cueing throughout session  Call nursing/pct to transfer to chair/use bathroom. Pt stated understanding.  LVAD: patient found on wall power / returned on battery power. No alarms sounded.   LVAD: Pt able to  switch wall <> battery source with minimum assistance from therapist.    Patient left  up in cardiac chair  with all lines intact, call button in reach, RN notified, and wife present.    Time Tracking:     PT Received On: 01/02/24  PT Start Time: 0924     PT Stop Time: 1005  PT Total Time (min): 41 min     Billable Minutes:   Gait Training 15 minutes,  Therapeutic Activity 16 minutes, and Neuromuscular Re-education 10 minutes    Treatment Type: Treatment  PT/PTA: PT       1/2/2024

## 2024-01-02 NOTE — PROGRESS NOTES
[facility] - Cardiology Stepdown  Adult Nutrition  Progress Note    SUMMARY       Recommendations    1.) Recommend continuing TF of Novasource Renal to goal rate of 45ml/hr to provide 2160 kcal, 98g PRO, and 774ml FF- FWF per MD (100% of EEN/EPN)  - this is w/in the NIH guidelines for cachexia.                 - monitor for s/s of intolerance such as residuals >500ml, n/v/d, or abdominal distension.                    - If increase TF rate warranted increase goal rate to Novasource Renal @ 50ml/hr to provide 2400 kcals, 109 g PRO, 860 ml fluid      2.  Continue BenePRO BID if CRRT continues (PRO needs will be higher), provides 12g PRO    3. Continue fiber for diarrhea as needed     4. RD to monitor wt, tolerance, skin, labs.     Goals: Meet % of EEN/EPN by RD f/u date  Nutrition Goal Status: goal met  Communication of RD Recs:  (POC)    Assessment and Plan    Endocrine  Moderate malnutrition  Malnutrition Type:  Context: acute illness or injury  Level: moderate     Related to (etiology):   Inadequate nutrition intake     Signs and Symptoms (as evidenced by):   Nutritional intake <75% x 7days, observed orbital wasting and trace edema present.      Malnutrition Characteristic Summary:  Energy Intake (Malnutrition): less than 75% for greater than 7 days  Fluid Accumulation (Malnutrition):  (Trace edema)        Interventions/Recommendations (treatment strategy):  Recommend continuing TF of Novasource Renal to goal rate of 45ml/hr to provide 2160 kcal, 98g PRO, and 774ml FF- FWF per MD (100% of EEN/EPN)- this is w/in the NIH guidelines for cachexia.                 - monitor for s/s of intolerance such as residuals >500ml, n/v/d, or abdominal distension.                   - consider adding BenePRO BID if CRRT continues (PRO needs will be higher), provides 12g PRO.               - RD to consider increasing if pt tolerates.      2. Recommend to re-evaluate bowel regimen to help aid in constipation; add fiber is cases  "of diarrhea.       3. RD to monitor wt, tolerance, skin, labs.      Nutrition Diagnosis Status:   Continues    Malnutrition Assessment  Malnutrition Context: acute illness or injury  Malnutrition Level: moderate          Energy Intake (Malnutrition): less than 75% for greater than 7 days  Fluid Accumulation (Malnutrition):  (Trace edema)   Orbital Region (Subcutaneous Fat Loss): mild depletion   [place] Region (Muscle Loss): moderate depletion                 Reason for Assessment    Reason For Assessment: RD follow-up  Diagnosis: cardiac disease (CHF/ s/p LVAD on 12/1)  Relevant Medical History: CAD, DMT2, CHF, PAF  Interdisciplinary Rounds: attended    General Information Comments: RD f/u. Discussed pt w/ RD that attend LVAD rounds today. Pt seen in room with family member at bedside. TF running at goal (45ml/hr) via NGT. Denies N/V/C, endorses diarrhea sometimes since receiving TF. Endorses some TF "coming up" in throat. Denies bloating or feeling overfull. Family member states pt is ready for NGT to be removed. Reassured pt that TF will be d/c when appropriate per MD and is used to optimize nutrition for healing. Glucose elevated. Pt received HD today. Barium swallow tomorrow planned for tomorrow. RD following    Nutrition Discharge Planning: Cardiac diet education provided 11/15- no additional questions for me at this time.    Nutrition Risk Screen    Nutrition Risk Screen: tube feeding or parenteral nutrition    Nutrition/Diet History    Spiritual, Cultural Beliefs, Hoahaoism Practices, Values that Affect Care: no  Food Allergies: NKFA    Anthropometrics    Temp: 98.1 °F (36.7 °C)  Height Method: Stated  Height: 6' (182.9 cm)  Height (inches): 72 in  Weight Method: Bed Scale  Weight: 82.1 kg (181 lb)  Weight (lb): 181 lb  Ideal Body Weight (IBW), Male: 178 lb  % Ideal Body Weight, Male (lb): 101.69 %  BMI (Calculated): 24.5  BMI Grade: 18.5-24.9 - normal       Lab/Procedures/Meds    Pertinent Labs Reviewed: " reviewed  Pertinent Labs Comments: H/h: 7/22.4, mchc: 31.3, na: 134, bun: 78, creatinine: 5.2, gfr: 11.8, glu: 140, alp: 175  Pertinent Medications Reviewed: reviewed  Pertinent Medications Comments: Famotidine, gabapentin, insulin, polyethylene glycol, psyllium husk, warfarin, senna-doc      Estimated/Assessed Needs    Weight Used For Calorie Calculations: 81 kg (178 lb 9.2 oz) (IBW d/t edema present)  Energy Calorie Requirements (kcal): 2025- 2430 kcal  Energy Need Method: Kcal/kg (25-30 kcal/ kg of IBW)  Protein Requirements: 97- 122g (1.2-1.5g/kg of IBW)  Weight Used For Protein Calculations: 81 kg (178 lb 9.2 oz) (IBW d/t edema present.)  Fluid Requirements (mL): 1 ml/kcal or per MD     RDA Method (mL): 2025         Nutrition Prescription Ordered    Current Diet Order: NPO  Nutrition Order Comments: Beneprotein BID via NGT  Current Nutrition Support Formula Ordered: Novasource Renal  Current Nutrition Support Rate Ordered: 45 (ml)  Current Nutrition Support Frequency Ordered: ml/hr x 24hrs    Evaluation of Received Nutrient/Fluid Intake    Enteral Calories (kcal): 2160  Enteral Protein (gm): 98  Enteral (Free Water) Fluid (mL): 774  Other Calories (kcal): 50 (beneprotein BID)  Total Calories (kcal): 2310  % Kcal Needs: 100  Other Protein (gm): 12  Total Protein (gm): 110 (beneprotein BID)  % Protein Needs: 100  I/O: +6.2 L since admit  Energy Calories Required: meeting needs  Protein Required: meeting needs  Fluid Required:  (Per MD)  Total Fluid Intake (mL/kg): 1 ml/kcal or per MD  Comments: LBM 1/1  Tolerance: tolerating  % Intake of Estimated Energy Needs: 75 - 100 %  % Meal Intake: NPO    Nutrition Risk    Level of Risk/Frequency of Follow-up:  (RD to f/u x 1-2/week)     Monitor and Evaluation    Food and Nutrient Intake: energy intake, food and beverage intake, enteral nutrition intake, parenteral nutrition intake  Food and Nutrient Adminstration: diet order, enteral and parenteral nutrition  administration  Knowledge/Beliefs/Attitudes: food and nutrition knowledge/skill, beliefs and attitudes  Physical Activity and Function: nutrition-related ADLs and IADLs, factors affecting access to physical activity  Anthropometric Measurements: height/length, weight, weight change, body mass index, growth pattern indices/percentile ranks  Biochemical Data, Medical Tests and Procedures: electrolyte and renal panel, gastrointestinal profile, glucose/endocrine profile, inflammatory profile, lipid profile  Nutrition-Focused Physical Findings: overall appearance, extremities, muscles and bones, head and eyes, skin     Nutrition Follow-Up    RD Follow-up?: Yes    Marion Salas RD, LDN

## 2024-01-02 NOTE — ASSESSMENT & PLAN NOTE
- follow up evaluation of skin injury.  - pt presented for sob. S/p LVAD placement 12/1.  - deep tissue injury to sacrum that has revealed a partial thickness tissue loss with pink and yellow wound base and darkened periwound. Friction and shearing complicating wound as well.  - continue Triad bid/prn.   - Immerse surface.  - turn q2h.  - wedge for offloading.  - nursing to maintain pressure injury prevention measures and continue wound care per orders.

## 2024-01-02 NOTE — PT/OT/SLP PROGRESS
Speech Language Pathology Treatment    Patient Name:  Radha Abbott   MRN:  [id number]  Admitting Diagnosis: LVAD (left ventricular assist device) present    Recommendations:                 General Recommendations:  Dysphagia therapy  Diet recommendations:  NPO, Liquid Diet Level: NPO, ice chips only when completing dysphagia exercises, Pt is a silent aspirator.   Aspiration Precautions: Strict aspiration precautions   General Precautions: Standard, LVAD  Communication strategies:  none    Assessment:     Radha Abbott is a 63 y.o. male with an SLP diagnosis of Dysphagia.  Subjective     Pt seen with spouse at the bedside   Patient goals: none stated     Pain/Comfort:  Pain Rating 1: 0/10  Pain Rating Post-Intervention 1: 0/10    Respiratory Status: Room air    Objective:     Has the patient been evaluated by SLP for swallowing?   Yes  Keep patient NPO? No   Current Respiratory Status:   Room air      Pt seen for ongoing dysphagia tx. Pt was seen with his brother at the bedside this date. Pt completing dysphagia exercises x3 each with with ice chips and mod cues. Pt with significant difficulty executing a second dry swallow. Pt jolting head backward to demonstrate a swallow however no true swallow able to be appreciated to digital palpation. With SLP max pt able to execute a dry swallow x1 this date. May be underlying neurologic component contributing to swallowing deficit.  SLP emphasized importance of attempting to  swallow 2 consecutive times with ice chips when completing swallow exercises. VAD team present at bedside and SLP discussed with all parties plan to repeat MBS tomorrow however concern for improved results given today's presentation. All parties in agreement with plan.     Goals:   Multidisciplinary Problems       SLP Goals          Problem: SLP    Goal Priority Disciplines Outcome   SLP Goal     SLP Ongoing, Progressing   Description: Speech Language Pathology Goals  Goals expected to be met by 12/24    1.  Pt will participate in ongoing assessment of swallow function to help determine least restrictive diet                            Plan:     Patient to be seen:  4 x/week   Plan of Care reviewed with:  patient   SLP Follow-Up:  Yes       Discharge recommendations:  High Intensity Therapy   Barriers to Discharge:  None    Time Tracking:     SLP Treatment Date:   01/02/24  Speech Start Time:  0859  Speech Stop Time:  0915     Speech Total Time (min):  16 min    Billable Minutes: Treatment Swallowing Dysfunction 8 and Self Care/Home Management Training 8    01/02/2024

## 2024-01-02 NOTE — SUBJECTIVE & OBJECTIVE
Interval History: No complaints or overnight events. Getting HD today. Struggling with swallowing still, due to get modified barium swallow tomorrow.     Continuous Infusions:   sodium chloride 0.9%      dextrose 10 % in water (D10W)       Scheduled Meds:   sodium chloride 0.9%   Intravenous Once    acetaminophen  1,000 mg Per NG tube TID    amiodarone  400 mg Per NG tube Daily    balsam peru-castor oiL   Topical (Top) BID    famotidine  20 mg Per G Tube Daily    gabapentin  125 mg Per NG tube Q12H    insulin aspart U-100  4 Units Subcutaneous 6 times per day    polyethylene glycol  17 g Per NG tube Daily    psyllium husk (aspartame)  1 packet Per NG tube Daily    senna-docusate 8.6-50 mg  2 tablet Per NG tube Daily    traZODone  50 mg Per NG tube QHS    venlafaxine  37.5 mg Per G Tube Daily    warfarin  2.5 mg Per G Tube Every Mon, Wed, Fri    warfarin  5 mg Per G Tube Every Tues, Thurs, Sat, Sun     PRN Meds:bisacodyL, dextrose 10 % in water (D10W), dextrose 10%, dextrose 10%, glucagon (human recombinant), heparin (porcine), hydrALAZINE, insulin aspart U-100, ondansetron, Flushing PICC/Midline Protocol **AND** [DISCONTINUED] sodium chloride 0.9% **AND** sodium chloride 0.9%, traMADoL, zolpidem    Review of patient's allergies indicates:  No Known Allergies  Objective:     Vital Signs (Most Recent):  Temp: 98.2 °F (36.8 °C) (01/02/24 0812)  Pulse: 95 (01/02/24 0812)  Resp: 16 (01/02/24 0812)  BP: (!) 78/0 (01/02/24 0818)  SpO2: 100 % (01/02/24 0812) Vital Signs (24h Range):  Temp:  [97.9 °F (36.6 °C)-99.1 °F (37.3 °C)] 98.2 °F (36.8 °C)  Pulse:  [60-97] 95  Resp:  [16-17] 16  SpO2:  [94 %-100 %] 100 %  BP: ()/(0-82) 78/0     No data found.  Body mass index is 24.55 kg/m².      Intake/Output Summary (Last 24 hours) at 1/2/2024 1056  Last data filed at 1/2/2024 0439  Gross per 24 hour   Intake 921 ml   Output 0 ml   Net 921 ml         Hemodynamic Parameters:  CVP:  [7 mmHg] 7 mmHg    Telemetry: SR        Physical Exam  Constitutional:       Comments: Cachectic, temporal wasting   HENT:      Head: Normocephalic and atraumatic.   Eyes:      Conjunctiva/sclera: Conjunctivae normal.      Pupils: Pupils are equal, round, and reactive to light.   Neck:      Comments: Do not appreciate elevated JVP  Cardiovascular:      Rate and Rhythm: Normal rate and regular rhythm.      Comments: Smooth VAD hum  Pulmonary:      Effort: Pulmonary effort is normal.      Breath sounds: Normal breath sounds.   Abdominal:      General: Bowel sounds are normal.      Palpations: Abdomen is soft.   Musculoskeletal:         General: No swelling. Normal range of motion.      Cervical back: Normal range of motion and neck supple.   Skin:     General: Skin is warm and dry.      Capillary Refill: Capillary refill takes 2 to 3 seconds.   Neurological:      General: No focal deficit present.      Mental Status: He is alert and oriented to person, place, and time.            Significant Labs:  CBC:  Recent Labs   Lab 12/31/23 0426 01/01/24 0412 01/02/24 0431   WBC 7.92 7.35 7.39   RBC 2.44* 2.42* 2.41*   HGB 7.0* 6.9* 7.0*   HCT 22.4* 21.8* 22.4*   * 409 450   MCV 92 90 93   MCH 28.7 28.5 29.0   MCHC 31.3* 31.7* 31.3*       BNP:  Recent Labs   Lab 12/27/23 0450 12/29/23 0406 01/01/24 0412   BNP 1,779* 2,177* 794*       CMP:  Recent Labs   Lab 12/31/23 0426 01/01/24 0412 01/02/24  0431   * 152* 140*   CALCIUM 8.0* 8.3* 8.6*   ALBUMIN 1.9* 1.9* 1.9*   PROT 6.7 6.6 6.8   * 133* 134*   K 3.4* 3.6 4.1   CO2 27 25 26   CL 98 97 97   BUN 36* 52* 78*   CREATININE 3.1* 4.5* 5.2*   ALKPHOS 179* 159* 175*   ALT 13 17 18   AST 37 36 36   BILITOT 0.4 0.4 0.3        Coagulation:   Recent Labs   Lab 12/31/23 0426 01/01/24 0412 01/02/24  0431   INR 2.4* 2.1* 2.2*       LDH:  Recent Labs   Lab 12/31/23  0426 01/01/24  0412 01/02/24  0431   * 353* 351*       Microbiology:  Microbiology Results (last 7 days)       ** No results  found for the last 168 hours. **            I have reviewed all pertinent labs within the past 24 hours.    Estimated Creatinine Clearance: 16.4 mL/min (A) (based on SCr of 5.2 mg/dL (H)).    Diagnostic Results:  I have reviewed and interpreted all pertinent imaging results/findings within the past 24 hours.

## 2024-01-02 NOTE — CONSULTS
"CONSULTATION LIAISON PSYCHIATRY INITIAL EVALUATION    Patient Name: Radha Abbott  MRN: [id number]  Patient Class: IP- Inpatient  Admission Date: 11/9/2023  Attending Physician: Brayan Ocampo MD      HPI:   Radha Abbott is a 63 y.o. male with no past psychiatric history & past pertinent medical history of  LVAD (left ventricular assist device), hx of CAD s/p 3v CABG (unclear anatomy, 2009), ICM with a recent EF of 15-20% s/p ICD (medtronik 2009), DM2 (a1c 7.7), HTN, HLD, Vfib on amio, chronic anemia, acute on chronic CKD  who initally presented to the ED with CC of SO and now he presents for psychiatric inpatient psychiatric consult. Psychiatry consulted was requested for assessment of MDD vs Adjustment d/o symptoms    On psych exam, Mr. Garcia was laying his bed with his wife at his bedside. Mr. Abbott was awake, alert, oriented to time, place, person, and date. He endorsed sadness, low mood stemming from his current hospitalization. He denied current suicidal ideation (SI) and no suicide plan or intent currently.  When asked about voicing suicidal ideation earlier he states he was "confused" and does not recall making earlier statements of SI. He states his mood is good, is happy when his wife is around and when friends and family visit him. He denies past history of SI/HI/AH/VH. He denied past history of depression, anxiety, jenna, hypomania, hallucinations, and denied substance use.       Collateral with patient's permission:   Spouse states patient didn't have any past psychiatric history and he never tried any type of psychotropics prior to this hospitalization.     Medical Review of Systems:  Pertinent items are noted in HPI.    Review of Systems   Constitutional: Negative.    HENT: Negative.     Eyes: Negative.    Respiratory: Negative.     Cardiovascular: Negative.    Gastrointestinal: Negative.    Genitourinary: Negative.    Musculoskeletal: Negative.    Skin: Negative.    Neurological: Negative.  "   Endo/Heme/Allergies: Negative.    Psychiatric/Behavioral: Negative.          Adjustment disorder with depressed mood   All other systems reviewed and are negative.        Psychiatric Review of Systems (is patient experiencing or having changes in):  sleep: no. Sleep well with trazodone now  appetite: no change  weight: no  energy/anergy: no  interest/pleasure/anhedonia: no  somatic symptoms: no  libido: no  anxiety/panic: yes, feels some anxiety regarding hospitalization and wants to go home  guilty/hopelessness: no  concentration: no  Airam:no  Psychosis: no  Trauma: no  S.I.B.s/risky behavior: no    Past Psychiatric History:  Previous Medication Trials: no but now taking trazodone 50 mg po qhs and venlafaxine 37.5 mg po daily  Previous Psychiatric Hospitalizations:no   Previous Suicide Attempts: no  History of Violence: no  Outpatient Psychiatrist: no  Family Psychiatric History: no    Substance Abuse History (with emphasis over the last 12 months):  Recreational Drugs:  Denies  Use of Alcohol: occasional, social use  Tobacco Use:yes  Rehab History:no    Social History:  Marital Status:   Children: 5  Employment Status/Info: currently employed  :no  Education: high school diploma/GED  Special Ed: no  Housing Status: with spouse  Access to gun: yes, states they are locked up at home.  Psychosocial Stressors: health  Functioning Relationships: good relationship with spouse or significant other    Legal History:  Past Charges/Incarcerations: no  Pending charges:no    Mental Status Exam:  General Appearance: appears stated age, normal weight, appropriately dressed  Behavior: normal; cooperative; reasonably friendly, pleasant, and polite; appropriate eye-contact; under good behavioral control  Involuntary Movements and Motor Activity: no abnormal involuntary movements noted; no tics, no tremors, no akathisia, no dystonia, no evidence of tardive dyskinesia; no psychomotor agitation or retardation  Gait  "and Station: unable to assess - patient lying down or seated  Speech and Language: intact; normal rate, rhythm, volume, tone, and pitch; conversational, spontaneous, and coherent; speaks and understands English proficiently and fluently; repeats words and phrases, no word finding difficulties are noted  Mood: "good"  Affect:  Appropriate  Thought Process and Associations: intact; linear, goal-directed, organized, and logical; no loosening of associations noted  Thought Content and Perceptions:: no suicidal or homicidal ideation, no auditory or visual hallucinations, no paranoid ideation, no ideas of reference, no evidence of delusions or psychosis  Sensorium and Orientation: oriented fully (to person, place, time and situation)  Recent and Remote Memory: He was not able to recall 3 items in 3 minutes   Attention and Concentration: intact, attentive to conversation  Fund of Knowledge: Fair he was only able to recall current PresidentZainab  Insight:  Fair per patient understanding of his symptoms  Judgment: intact per patient behavior and per HPI      ASSESSMENT & RECOMMENDATIONS    Per assessment patient presented as sad due to his illness and prolonged hospitalization. Patient denied previous psychiatric history and no previous depressive episodes prior to hospitalizations.  Patient meets the criteria for adjustment disorder with depressed mood    Adjustment Disorder w/ Depressed Mood  PSYCH MEDICATIONS   Continue scheduled  Venlafaxine 37.5mg po daily for depressed mood due to AD  Trazodone 50mg po qhs      RISK ASSESSMENT  NO NEED FOR PEC patient NOT in any imminent danger of hurting self or others and not gravely disabled.     FOLLOW UP  Will follow up while in house  Will sign off. Patient can follow up with outpatient mental health provider after discharge. Resources provided in patient's discharge instructions.    Please contact ON CALL psychiatry service (24/7) for any acute issues that may arise.    Bri " ALETHA Frost  CL Psychiatry  [facility]  1/2/2024 3:20 PM        --------------------------------------------------------------------------------------------------------------------------------------------------------------------------------------------------------------------------------------    CONTINUED HISTORY & OBJECTIVE clinical data & findings reviewed and noted for above decision making    Past Medical/Surgical History:   Past Medical History:   Diagnosis Date    CAD (coronary artery disease)     Diabetes mellitus     HFrEF (heart failure with reduced ejection fraction)     ICD (implantable cardioverter-defibrillator) in place     MI, old      Past Surgical History:   Procedure Laterality Date    ANGIOPLASTY-VENOUS ARTERY Right 12/1/2023    Procedure: ANGIOPLASTY-VENOUS ARTERY, RIGHT FEMORAL;  Surgeon: Yuri Washington MD;  Location: [address];  Service: Cardiovascular;  Laterality: Right;    AORTIC VALVULOPLASTY N/A 12/1/2023    Procedure: REPAIR, AORTIC VALVE;  Surgeon: Yuri Washington MD;  Location: [facility] OR [address] [place] [address];  Service: Cardiovascular;  Laterality: N/A;    CARDIAC SURGERY      CLOSURE N/A 12/1/2023    Procedure: CLOSURE, TEMPORARY;  Surgeon: Yuri Washington MD;  Location: [facility] OR [address] [place] [address];  Service: Cardiovascular;  Laterality: N/A;    DRAINAGE OF PLEURAL EFFUSION  12/4/2023    Procedure: DRAINAGE, PLEURAL EFFUSION;  Surgeon: Yuri Washington MD;  Location: [facility] OR 2ND FLR;  Service: Cardiovascular;;    INSERTION OF GRAFT TO PERICARDIUM  12/4/2023    Procedure: INSERTION, GRAFT, PERICARDIUM;  Surgeon: Yuri Washington MD;  Location: [facility] OR [facility] FLR;  Service: Cardiovascular;;    INSERTION OF INTRA-AORTIC BALLOON ASSIST DEVICE Right 11/21/2023    Procedure: INSERTION, INTRA-AORTIC BALLOON PUMP;  Surgeon: Finn Cohn MD;  Location: [facility] CATH LAB;  Service: Cardiology;  Laterality: Right;    LEFT VENTRICULAR ASSIST DEVICE Left 12/1/2023    Procedure: INSERTION-LEFT  VENTRICULAR ASSIST DEVICE;  Surgeon: Yuri Washington MD;  Location: [facility] OR [facility] FLR;  Service: Cardiovascular;  Laterality: Left;  REDO STERNOTOMY - REDO SAW NEEDED FOR CASE    LYSIS OF ADHESIONS  12/1/2023    Procedure: LYSIS, ADHESIONS;  Surgeon: Yuri Washington MD;  Location: [address] FLR;  Service: Cardiovascular;;    PLACEMENT OF SWAN ROLANDO CATHETER WITH IMAGING GUIDANCE  11/20/2023    Procedure: INSERTION, CATHETER, SWAN-ROLANDO, WITH IMAGING GUIDANCE;  Surgeon: Sajan Hurley MD;  Location: [facility] CATH LAB;  Service: Cardiology;;    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 12/1/2023    Procedure: REPAIR, ANEURYSM, ARTERY, FEMORAL;  Surgeon: Yuri Washington MD;  [address];  Service: Cardiovascular;  Laterality: Right;  Right Femoral Artery Repair    RIGHT HEART CATHETERIZATION Right 10/10/2023    Procedure: INSERTION, CATHETER, RIGHT HEART;  Surgeon: Bin Gandhi MD;  Location: [facility] CATH LAB;  Service: Cardiology;  Laterality: Right;    RIGHT HEART CATHETERIZATION Right 10/13/2023    Procedure: INSERTION, CATHETER, RIGHT HEART;  Surgeon: Walter Mcintyre MD;  Location: [facility] CATH LAB;  Service: Cardiology;  Laterality: Right;    RIGHT HEART CATHETERIZATION  11/13/2023    RIGHT HEART CATHETERIZATION Right 11/13/2023    Procedure: INSERTION, CATHETER, RIGHT HEART;  Surgeon: Juventino Bermudez Jr., MD;  Location: [facility] CATH LAB;  Service: Cardiology;  Laterality: Right;    RIGHT HEART CATHETERIZATION Right 11/20/2023    Procedure: INSERTION, CATHETER, RIGHT HEART;  Surgeon: Sajan Hurley MD;  Location: [facility] CATH LAB;  Service: Cardiology;  Laterality: Right;    STERNAL WOUND CLOSURE N/A 12/4/2023    Procedure: CLOSURE, WOUND, STERNUM;  Surgeon: Yuri Washington MD;  [address];  Service: Cardiovascular;  Laterality: N/A;    STERNOTOMY N/A 12/1/2023    Procedure: STERNOTOMY, REDO;  Surgeon: Yuri Washington MD;  [facility];  Service: Cardiovascular;   Laterality: N/A;    VALVULOPLASTY, MITRAL VALVE N/A 12/1/2023    Procedure: VALVULOPLASTY, MITRAL VALVE;  Surgeon: Yuri Washington MD;  Location: [facility] OR [address] [place] [address];  Service: Cardiovascular;  Laterality: N/A;       Current Medications:   Scheduled Meds:    sodium chloride 0.9%   Intravenous Once    acetaminophen  1,000 mg Per NG tube TID    amiodarone  400 mg Per NG tube Daily    balsam peru-castor oiL   Topical (Top) BID    famotidine  20 mg Per G Tube Daily    gabapentin  125 mg Per NG tube Q12H    insulin aspart U-100  4 Units Subcutaneous 6 times per day    polyethylene glycol  17 g Per NG tube Daily    psyllium husk (aspartame)  1 packet Per NG tube Daily    senna-docusate 8.6-50 mg  2 tablet Per NG tube Daily    traZODone  50 mg Per NG tube QHS    venlafaxine  37.5 mg Per G Tube Daily    warfarin  2.5 mg Per G Tube Every Mon, Wed, Fri    warfarin  5 mg Per G Tube Every Tues, Thurs, Sat, Sun     PRN Meds: bisacodyL, dextrose 10 % in water (D10W), dextrose 10%, dextrose 10%, glucagon (human recombinant), heparin (porcine), hydrALAZINE, insulin aspart U-100, ondansetron, Flushing PICC/Midline Protocol **AND** [DISCONTINUED] sodium chloride 0.9% **AND** sodium chloride 0.9%, traMADoL, zolpidem    Allergies:   Review of patient's allergies indicates:  No Known Allergies    Vitals  Vitals:    01/02/24 1600   BP:    Pulse: 91   Resp:    Temp:        Labs/Imaging/Studies:  Recent Results (from the past 24 hour(s))   POCT glucose    Collection Time: 01/01/24  5:14 PM   Result Value Ref Range    POCT Glucose 161 (H) 70 - 110 mg/dL   POCT glucose    Collection Time: 01/01/24  7:55 PM   Result Value Ref Range    POCT Glucose 132 (H) 70 - 110 mg/dL   POCT glucose    Collection Time: 01/02/24 12:19 AM   Result Value Ref Range    POCT Glucose 146 (H) 70 - 110 mg/dL   POCT glucose    Collection Time: 01/02/24  4:30 AM   Result Value Ref Range    POCT Glucose 151 (H) 70 - 110 mg/dL   Lactate dehydrogenase    Collection Time:  01/02/24  4:31 AM   Result Value Ref Range     (H) 110 - 260 U/L   Protime-INR    Collection Time: 01/02/24  4:31 AM   Result Value Ref Range    Prothrombin Time 22.4 (H) 9.0 - 12.5 sec    INR 2.2 (H) 0.8 - 1.2   Phosphorus    Collection Time: 01/02/24  4:31 AM   Result Value Ref Range    Phosphorus 2.7 2.7 - 4.5 mg/dL   Magnesium    Collection Time: 01/02/24  4:31 AM   Result Value Ref Range    Magnesium 2.1 1.6 - 2.6 mg/dL   Hepatic function panel    Collection Time: 01/02/24  4:31 AM   Result Value Ref Range    Total Protein 6.8 6.0 - 8.4 g/dL    Albumin 1.9 (L) 3.5 - 5.2 g/dL    Total Bilirubin 0.3 0.1 - 1.0 mg/dL    Bilirubin, Direct 0.2 0.1 - 0.3 mg/dL    AST 36 10 - 40 U/L    ALT 18 10 - 44 U/L    Alkaline Phosphatase 175 (H) 55 - 135 U/L   CBC auto differential    Collection Time: 01/02/24  4:31 AM   Result Value Ref Range    WBC 7.39 3.90 - 12.70 K/uL    RBC 2.41 (L) 4.60 - 6.20 M/uL    Hemoglobin 7.0 (L) 14.0 - 18.0 g/dL    Hematocrit 22.4 (L) 40.0 - 54.0 %    MCV 93 82 - 98 fL    MCH 29.0 27.0 - 31.0 pg    MCHC 31.3 (L) 32.0 - 36.0 g/dL    RDW 16.3 (H) 11.5 - 14.5 %    Platelets 450 150 - 450 K/uL    MPV 9.9 9.2 - 12.9 fL    Immature Granulocytes 0.4 0.0 - 0.5 %    Gran # (ANC) 5.8 1.8 - 7.7 K/uL    Immature Grans (Abs) 0.03 0.00 - 0.04 K/uL    Lymph # 0.7 (L) 1.0 - 4.8 K/uL    Mono # 0.7 0.3 - 1.0 K/uL    Eos # 0.1 0.0 - 0.5 K/uL    Baso # 0.04 0.00 - 0.20 K/uL    nRBC 0 0 /100 WBC    Gran % 78.2 (H) 38.0 - 73.0 %    Lymph % 9.9 (L) 18.0 - 48.0 %    Mono % 9.2 4.0 - 15.0 %    Eosinophil % 1.8 0.0 - 8.0 %    Basophil % 0.5 0.0 - 1.9 %    Differential Method Automated    Basic metabolic panel    Collection Time: 01/02/24  4:31 AM   Result Value Ref Range    Sodium 134 (L) 136 - 145 mmol/L    Potassium 4.1 3.5 - 5.1 mmol/L    Chloride 97 95 - 110 mmol/L    CO2 26 23 - 29 mmol/L    Glucose 140 (H) 70 - 110 mg/dL    BUN 78 (H) 8 - 23 mg/dL    Creatinine 5.2 (H) 0.5 - 1.4 mg/dL    Calcium 8.6 (L) 8.7 -  10.5 mg/dL    Anion Gap 11 8 - 16 mmol/L    eGFR 11.8 (A) >60 mL/min/1.73 m^2   POCT glucose    Collection Time: 01/02/24  8:25 AM   Result Value Ref Range    POCT Glucose 155 (H) 70 - 110 mg/dL   POCT glucose    Collection Time: 01/02/24 11:53 AM   Result Value Ref Range    POCT Glucose 198 (H) 70 - 110 mg/dL   Echo    Collection Time: 01/02/24  2:40 PM   Result Value Ref Range    BSA 2.04 m2    LVIDd 6.25 (A) 3.5 - 6.0 cm    LV Systolic Volume 147.01 mL    LV Systolic Volume Index 72.1 mL/m2    LVIDs 5.49 (A) 2.1 - 4.0 cm    LV Diastolic Volume 197.42 mL    LV Diastolic Volume Index 96.77 mL/m2    IVS 0.71 0.6 - 1.1 cm    LVOT diameter 2.19 cm    LVOT area 3.8 cm2    FS 12 (A) 28 - 44 %    Left Ventricle Relative Wall Thickness 0.20 cm    Posterior Wall 0.63 0.6 - 1.1 cm    LV mass 161.26 g    LV Mass Index 79 g/m2    MV Peak E Zeferino 0.71 m/s    TDI LATERAL 0.07 m/s    TDI SEPTAL 0.03 m/s    E/E' ratio 14.20 m/s    TR Max Zeferino 2.47 m/s    LV SEPTAL E/E' RATIO 23.67 m/s    LA Volume Index 63.7 mL/m2    LV LATERAL E/E' RATIO 10.14 m/s    LA volume 129.96 cm3    RVDD 3.19 cm    TAPSE 1.39 cm    LA size 4.70 cm    Left Atrium Minor Axis 5.59 cm    Left Atrium Major Axis 6.48 cm    LA volume (mod) 158.41 cm3    LA WIDTH 5.42 cm    LA Volume Index (Mod) 77.7 mL/m2    RA Major Axis 4.60 cm    RA Width 3.48 cm    Triscuspid Valve Regurgitation Peak Gradient 24 mmHg    Sinus 3.48 cm    STJ 2.52 cm    Mean e' 0.05 m/s    ZLVIDS 3.06     ZLVIDD 0.27      Imaging Results              X-Ray Chest AP Portable (Final result)  Result time 11/09/23 19:08:29      Final result by Norm Mccain MD (11/09/23 19:08:29)                   Impression:      As above      Electronically signed by: Norm Mccain MD  Date:    11/09/2023  Time:    19:08               Narrative:    EXAMINATION:  XR CHEST AP PORTABLE    CLINICAL HISTORY:  pulmonary edema;    TECHNIQUE:  Single frontal view of the chest was  performed.    COMPARISON:  11/09/2023    FINDINGS:  Right PICC catheter tip projects over the mid to distal SVC.  Left chest wall pacer stable.  There is patchy increased interstitial and parenchymal attenuation bilaterally, similar to the previous examination allowing for shallow inspiratory effort on current exam.  There is no pneumothorax or significant interval detrimental change in the cardiopulmonary status since the previous exam.                                       X-Ray Chest AP Portable (Final result)  Result time 11/09/23 09:56:37      Final result by Jake Gupta MD (11/09/23 09:56:37)                   Impression:      The cardiopericardial silhouette remains enlarged.    Mild pulmonary venous hypertension, possibly related to CHF.    Additional findings as above.      Electronically signed by: Jake Gupta  Date:    11/09/2023  Time:    09:56               Narrative:    EXAMINATION:  XR CHEST AP PORTABLE    CLINICAL HISTORY:  CHF;    TECHNIQUE:  Single frontal view of the chest was performed.    COMPARISON:  Portable chest x-ray 10/30/2023    FINDINGS:  Midline thoracic trachea, allowing for rightward rotation of the patient's torso.    The cardiopericardial silhouette remains enlarged, similar to the comparison study.    Left subclavian transvenous ICD, sternotomy wires, and visualized portions of a right upper extremity PICC line demonstrate no adverse interval changes from 10/30/2023.    Mildly engorged pulmonary vasculature, especially in the upper lobes.  Although patient positioning could contribute to this, some underlying pulmonary venous hypertension is not excluded.    Hazy increased opacities projecting over either lower lung are favored to be related to superimposed chest wall soft tissues.  Some new underlying pulmonary or pleural abnormalities, such as mild pulmonary edema, is not excluded.    No discrete pneumothorax.    No blunting of either lateral costophrenic  angle.    Degenerative changes in the suboptimally visualized spine.

## 2024-01-02 NOTE — PROGRESS NOTES
Patient 1:1 HD started at pt bedside, Pt has on LVAD. Pt is on central monitoring telemetry.  Report received from primary care nurse  VS's per dialysis Flowsheet.     Hemodialysis initiated using the following:     Dialysis Access: Right chest wall cath     Will Maintain telemetry and blood pressure monitoring throughout treatment.  Refer to dialysis flowsheet and MAR for details.

## 2024-01-02 NOTE — NURSING
Holding Warfarin while patient on dialysis. Dialysis expected to be competed around 1845, warfarin to be given at this time. Will report to night nurse.

## 2024-01-02 NOTE — SUBJECTIVE & OBJECTIVE
Subjective:     HPI:  Radha Abbott is a 63 year old male with hx of CAD s/p 3v CABG (unclear anatomy, 2009), ICM with a recent EF of 15-20% s/p ICD (medmauricio 2009), DM2 (a1c 7.7), HTN, HLD, Vfib on amio who presents to the ED with CC of SOB.     Pt was recently admitted to [facility] as a transfer.  He was started on a Lasix gtt and did well.  Started on gDMT and discharged home on  5 with plans to follow up in HTS clinic for ongoing transplant evaluation at another facility.  He says that about a few days ago he started to notice LE swelling.  This turned into Nelson and orthopnea.  He says he can't walk to the bathroom without getting SOB.  He also complains of weight gain.  He takes torsemide 20mg BID at home and was told to trial additional Lasix which he did without any improvement.  He was rx metolazone but has not been filled.  He came to the ED     In the ED he was AF with stable VS on RA.  CBC showing chronic anemia.  CMP notable for acute on chronic CKD with baseline around 2.1 and Cr now 2.6.  BNP elevated.  Lactate neg.  CXR showing pulmonary edema.  I evaluated the pt at the bedside.  Bedside TTE showing CVP >15.  He was subsequently admitted to the CCU for diuresis. Skin integrity ENRRIQUE consulted for evaluation of skin injury.    Hospital Course:   No notes on file      Follow-up For: Procedure(s) (LRB):  CLOSURE, WOUND, STERNUM (N/A)  INSERTION, GRAFT, PERICARDIUM  DRAINAGE, PLEURAL EFFUSION    Post-Operative Day: 29 Days Post-Op    Scheduled Meds:   sodium chloride 0.9%   Intravenous Once    acetaminophen  1,000 mg Per NG tube TID    amiodarone  400 mg Per NG tube Daily    balsam peru-castor oiL   Topical (Top) BID    famotidine  20 mg Per G Tube Daily    gabapentin  125 mg Per NG tube Q12H    insulin aspart U-100  4 Units Subcutaneous 6 times per day    polyethylene glycol  17 g Per NG tube Daily    psyllium husk (aspartame)  1 packet Per NG tube Daily    senna-docusate 8.6-50 mg  2 tablet Per NG tube  Daily    traZODone  50 mg Per NG tube QHS    venlafaxine  37.5 mg Per G Tube Daily    warfarin  2.5 mg Per G Tube Every Mon, Wed, Fri    warfarin  5 mg Per G Tube Every Tues, Thurs, Sat, Sun     Continuous Infusions:   sodium chloride 0.9%      dextrose 10 % in water (D10W)       PRN Meds:bisacodyL, dextrose 10 % in water (D10W), dextrose 10%, dextrose 10%, glucagon (human recombinant), heparin (porcine), hydrALAZINE, insulin aspart U-100, ondansetron, Flushing PICC/Midline Protocol **AND** [DISCONTINUED] sodium chloride 0.9% **AND** sodium chloride 0.9%, traMADoL, zolpidem    Review of Systems   Skin:  Positive for wound.     Objective:     Vital Signs (Most Recent):  Temp: 98.1 °F (36.7 °C) (01/02/24 1537)  Pulse: 93 (01/02/24 1700)  Resp: 18 (01/02/24 1550)  BP: 91/65 (01/02/24 1645)  SpO2: 100 % (01/02/24 1550) Vital Signs (24h Range):  Temp:  [97.9 °F (36.6 °C)-99.1 °F (37.3 °C)] 98.1 °F (36.7 °C)  Pulse:  [] 93  Resp:  [16-18] 18  SpO2:  [94 %-100 %] 100 %  BP: ()/(0-82) 91/65     Weight: 82.1 kg (181 lb)  Body mass index is 24.55 kg/m².     Physical Exam  Constitutional:       Appearance: Normal appearance.   Skin:     General: Skin is warm and dry.      Findings: Lesion present.   Neurological:      Mental Status: He is alert.          Laboratory:  All pertinent labs reviewed within the last 24 hours.    Diagnostic Results:  None

## 2024-01-02 NOTE — PROGRESS NOTES
[facility]  Psychology  Consult Note      PROGRESS NOTE - INTERVENTION  Patient Name: Radha Abbott  MRN: [id number]  Date: 01/02/2024  Admission Date: 11/9/2023   Length of Stay: Hospital Day: 55   Attending Physician: Brayan Ocampo MD    HISTORY OF PRESENTING ILLNESS: 64 yo male with LVAD for DT on 12/1, post op complicated by diffuse coagulopathy and RV dysfunction with subsequent chest closure on 12/4. Hospital course complicated by respiratory failure s/p intubation now extubated.    BRIEF ASSESSMENT  Mood: Regular periods of low mood that sometimes last all day. Also noted difficulties managing irritability. Denied anhedonia and suicidal ideation.  Anxiety: Moderate anxiety related to worries about health and recovery. Endorsed excessive anxiety, uncontrollable worry, anxiety-related muscle tension.   Pain: Rated current pain as 5/10. Denied concerns regarding pain management.  Sleep: Mild impairment involving nighttime awakenings. Stated that Trazodone helps.  Appetite: Denied concerns. Currently has NGT.    INTERVENTION  Intervention Type: Motivational Interviewing  Session Content: Engaged patient in motivational interviewing intervention focused on spending time in a chair. Elicited description of health values and recovery goals. Patient voiced an understanding of the importance of spending time up in the chair. He described a commitment to spend 2 hours in the chair each day.     MENTAL STATUS EXAM & OBSERVATIONS  Appearance:  Good grooming and hygiene. Dressed in hospital gown. Appeared stated age.      Orientation: Oriented x 4.   Speech: Soft volume. Communicated mostly via yes/no head nods.   Thought Processes: Logical and goal-oriented.      Thought Content: Normal. No suicidal or homicidal ideation. No indications of obsessions or delusions.   Mood: Anxious.   Affect: Full range, congruent with mood and session content..   Attention & Concentration: Intact. No deficits noted.    Insight: Good   Judgment: Good.   Behavioral Observations: Cooperative and engaged. Sitting in chair. Good eye contact. No signs of psychomotor agitation or retardation.     DIAGNOSTIC IMPRESSION & PLAN  Patient Active Problem List   Diagnosis    Ventricular tachycardia    CAD (coronary artery disease)    HFrEF (heart failure with reduced ejection fraction)    Type 2 diabetes mellitus without complication, without long-term current use of insulin    Hypokalemia    Acute renal failure with acute tubular necrosis superimposed on stage 3b chronic kidney disease    PAF (paroxysmal atrial fibrillation)    Ventricular fibrillation    Acute on chronic combined systolic and diastolic CHF, NYHA class 4    Defibrillator discharge    Receiving inotropic medication    Pre-transplant evaluation for heart transplant    Palliative care encounter    Advance care planning    LVAD (left ventricular assist device) present    At high risk for skin breakdown    Abnormal CXR    Acute encephalopathy    IVÁN (acute kidney injury)    Heart failure    Septic shock    Depressed mood    Moderate malnutrition    Dysphonia    Unilateral complete vocal fold paralysis    Oliguria    Shortness of breath    Acute renal failure with tubular necrosis    Acute renal failure on dialysis    Acute kidney injury superimposed on chronic kidney disease       Impression: 64 yo male with LVAD for DT on 12/1, post op complicated by diffuse coagulopathy and RV dysfunction with subsequent chest closure on 12/4. Hospital course complicated by respiratory failure s/p intubation now extubated. Psychiatry following. Patient endorsed moderate low mood and anxiety, which are directly related to current medical stress. Patient meets criteria for Adjustment Disorder with Anxiety. Social support is good. Overall, coping skills are somewhat limited.    Plan: Psychology will continue to follow.     Recommendations  For RN: Please ask patient for preference regarding  scheduling time to sit up in a chair. Increasing sense of control may work to improve motivation.       Length of Service: 25 minutes    Blue Hdz, Ph.D.  Dept. of Psychiatry  [facility]

## 2024-01-02 NOTE — ASSESSMENT & PLAN NOTE
- blood filled blister to right heel.  - skin remains intact.  - continue BPCO bid/prn.  - heel foams intact.  - recommend heel protector boots also.

## 2024-01-03 LAB
ALBUMIN SERPL BCP-MCNC: 1.9 G/DL (ref 3.5–5.2)
ALP SERPL-CCNC: 176 U/L (ref 55–135)
ALT SERPL W/O P-5'-P-CCNC: 22 U/L (ref 10–44)
ANION GAP SERPL CALC-SCNC: 12 MMOL/L (ref 8–16)
AST SERPL-CCNC: 43 U/L (ref 10–40)
BASOPHILS # BLD AUTO: 0.04 K/UL (ref 0–0.2)
BASOPHILS NFR BLD: 0.6 % (ref 0–1.9)
BILIRUB DIRECT SERPL-MCNC: 0.1 MG/DL (ref 0.1–0.3)
BILIRUB SERPL-MCNC: 0.3 MG/DL (ref 0.1–1)
BNP SERPL-MCNC: 600 PG/ML (ref 0–99)
BUN SERPL-MCNC: 50 MG/DL (ref 8–23)
CA-I BLDV-SCNC: 1.17 MMOL/L (ref 1.06–1.42)
CALCIUM SERPL-MCNC: 8.5 MG/DL (ref 8.7–10.5)
CHLORIDE SERPL-SCNC: 100 MMOL/L (ref 95–110)
CO2 SERPL-SCNC: 24 MMOL/L (ref 23–29)
CREAT SERPL-MCNC: 3.5 MG/DL (ref 0.5–1.4)
CRP SERPL-MCNC: 135.6 MG/L (ref 0–8.2)
DIFFERENTIAL METHOD BLD: ABNORMAL
EOSINOPHIL # BLD AUTO: 0.1 K/UL (ref 0–0.5)
EOSINOPHIL NFR BLD: 1 % (ref 0–8)
ERYTHROCYTE [DISTWIDTH] IN BLOOD BY AUTOMATED COUNT: 16.5 % (ref 11.5–14.5)
EST. GFR  (NO RACE VARIABLE): 19 ML/MIN/1.73 M^2
GLUCOSE SERPL-MCNC: 143 MG/DL (ref 70–110)
HCT VFR BLD AUTO: 22.4 % (ref 40–54)
HGB BLD-MCNC: 6.9 G/DL (ref 14–18)
IMM GRANULOCYTES # BLD AUTO: 0.03 K/UL (ref 0–0.04)
IMM GRANULOCYTES NFR BLD AUTO: 0.5 % (ref 0–0.5)
INR PPP: 2.8 (ref 0.8–1.2)
LDH SERPL L TO P-CCNC: 372 U/L (ref 110–260)
LYMPHOCYTES # BLD AUTO: 0.7 K/UL (ref 1–4.8)
LYMPHOCYTES NFR BLD: 11.5 % (ref 18–48)
MAGNESIUM SERPL-MCNC: 2 MG/DL (ref 1.6–2.6)
MCH RBC QN AUTO: 28.4 PG (ref 27–31)
MCHC RBC AUTO-ENTMCNC: 30.8 G/DL (ref 32–36)
MCV RBC AUTO: 92 FL (ref 82–98)
MONOCYTES # BLD AUTO: 0.7 K/UL (ref 0.3–1)
MONOCYTES NFR BLD: 10.5 % (ref 4–15)
NEUTROPHILS # BLD AUTO: 4.8 K/UL (ref 1.8–7.7)
NEUTROPHILS NFR BLD: 75.9 % (ref 38–73)
NRBC BLD-RTO: 0 /100 WBC
PHOSPHATE SERPL-MCNC: 1.8 MG/DL (ref 2.7–4.5)
PLATELET # BLD AUTO: 441 K/UL (ref 150–450)
PMV BLD AUTO: 9.8 FL (ref 9.2–12.9)
POCT GLUCOSE: 111 MG/DL (ref 70–110)
POCT GLUCOSE: 143 MG/DL (ref 70–110)
POCT GLUCOSE: 155 MG/DL (ref 70–110)
POCT GLUCOSE: 164 MG/DL (ref 70–110)
POCT GLUCOSE: 194 MG/DL (ref 70–110)
POCT GLUCOSE: 204 MG/DL (ref 70–110)
POTASSIUM SERPL-SCNC: 4.3 MMOL/L (ref 3.5–5.1)
PREALB SERPL-MCNC: 30 MG/DL (ref 20–43)
PROT SERPL-MCNC: 6.9 G/DL (ref 6–8.4)
PROTHROMBIN TIME: 28.1 SEC (ref 9–12.5)
RBC # BLD AUTO: 2.43 M/UL (ref 4.6–6.2)
SODIUM SERPL-SCNC: 136 MMOL/L (ref 136–145)
WBC # BLD AUTO: 6.27 K/UL (ref 3.9–12.7)

## 2024-01-03 PROCEDURE — 25000003 PHARM REV CODE 250: Performed by: NURSE PRACTITIONER

## 2024-01-03 PROCEDURE — 94761 N-INVAS EAR/PLS OXIMETRY MLT: CPT

## 2024-01-03 PROCEDURE — 86140 C-REACTIVE PROTEIN: CPT | Performed by: STUDENT IN AN ORGANIZED HEALTH CARE EDUCATION/TRAINING PROGRAM

## 2024-01-03 PROCEDURE — 63600175 PHARM REV CODE 636 W HCPCS

## 2024-01-03 PROCEDURE — 25500020 PHARM REV CODE 255: Performed by: INTERNAL MEDICINE

## 2024-01-03 PROCEDURE — 25000003 PHARM REV CODE 250

## 2024-01-03 PROCEDURE — 80048 BASIC METABOLIC PNL TOTAL CA: CPT | Performed by: NURSE PRACTITIONER

## 2024-01-03 PROCEDURE — 84134 ASSAY OF PREALBUMIN: CPT | Performed by: NURSE PRACTITIONER

## 2024-01-03 PROCEDURE — 83880 ASSAY OF NATRIURETIC PEPTIDE: CPT | Performed by: STUDENT IN AN ORGANIZED HEALTH CARE EDUCATION/TRAINING PROGRAM

## 2024-01-03 PROCEDURE — 27000248 HC VAD-ADDITIONAL DAY

## 2024-01-03 PROCEDURE — 97535 SELF CARE MNGMENT TRAINING: CPT

## 2024-01-03 PROCEDURE — 83735 ASSAY OF MAGNESIUM: CPT | Performed by: INTERNAL MEDICINE

## 2024-01-03 PROCEDURE — 82330 ASSAY OF CALCIUM: CPT | Performed by: PHYSICIAN ASSISTANT

## 2024-01-03 PROCEDURE — 97530 THERAPEUTIC ACTIVITIES: CPT

## 2024-01-03 PROCEDURE — 90832 PSYTX W PT 30 MINUTES: CPT | Mod: ,,, | Performed by: STUDENT IN AN ORGANIZED HEALTH CARE EDUCATION/TRAINING PROGRAM

## 2024-01-03 PROCEDURE — 25000003 PHARM REV CODE 250: Performed by: INTERNAL MEDICINE

## 2024-01-03 PROCEDURE — 84100 ASSAY OF PHOSPHORUS: CPT | Performed by: INTERNAL MEDICINE

## 2024-01-03 PROCEDURE — 99233 SBSQ HOSP IP/OBS HIGH 50: CPT | Mod: ,,, | Performed by: PHYSICIAN ASSISTANT

## 2024-01-03 PROCEDURE — 94668 MNPJ CHEST WALL SBSQ: CPT

## 2024-01-03 PROCEDURE — 99900035 HC TECH TIME PER 15 MIN (STAT)

## 2024-01-03 PROCEDURE — 51798 US URINE CAPACITY MEASURE: CPT

## 2024-01-03 PROCEDURE — 20600001 HC STEP DOWN PRIVATE ROOM

## 2024-01-03 PROCEDURE — 85025 COMPLETE CBC W/AUTO DIFF WBC: CPT | Performed by: INTERNAL MEDICINE

## 2024-01-03 PROCEDURE — 83615 LACTATE (LD) (LDH) ENZYME: CPT | Performed by: STUDENT IN AN ORGANIZED HEALTH CARE EDUCATION/TRAINING PROGRAM

## 2024-01-03 PROCEDURE — 93750 INTERROGATION VAD IN PERSON: CPT | Mod: ,,, | Performed by: INTERNAL MEDICINE

## 2024-01-03 PROCEDURE — 92611 MOTION FLUOROSCOPY/SWALLOW: CPT

## 2024-01-03 PROCEDURE — 97116 GAIT TRAINING THERAPY: CPT

## 2024-01-03 PROCEDURE — A9698 NON-RAD CONTRAST MATERIALNOC: HCPCS | Performed by: INTERNAL MEDICINE

## 2024-01-03 PROCEDURE — 85610 PROTHROMBIN TIME: CPT | Performed by: INTERNAL MEDICINE

## 2024-01-03 PROCEDURE — 80076 HEPATIC FUNCTION PANEL: CPT | Performed by: INTERNAL MEDICINE

## 2024-01-03 RX ADMIN — POLYETHYLENE GLYCOL 3350 17 G: 17 POWDER, FOR SOLUTION ORAL at 09:01

## 2024-01-03 RX ADMIN — BARIUM SULFATE 15 ML: 0.81 POWDER, FOR SUSPENSION ORAL at 02:01

## 2024-01-03 RX ADMIN — INSULIN ASPART 4 UNITS: 100 INJECTION, SOLUTION INTRAVENOUS; SUBCUTANEOUS at 11:01

## 2024-01-03 RX ADMIN — WARFARIN SODIUM 2.5 MG: 2.5 TABLET ORAL at 05:01

## 2024-01-03 RX ADMIN — INSULIN ASPART 4 UNITS: 100 INJECTION, SOLUTION INTRAVENOUS; SUBCUTANEOUS at 08:01

## 2024-01-03 RX ADMIN — Medication: at 08:01

## 2024-01-03 RX ADMIN — FAMOTIDINE 20 MG: 20 TABLET, FILM COATED ORAL at 08:01

## 2024-01-03 RX ADMIN — ACETAMINOPHEN 1000 MG: 500 TABLET ORAL at 08:01

## 2024-01-03 RX ADMIN — ACETAMINOPHEN 1000 MG: 500 TABLET ORAL at 03:01

## 2024-01-03 RX ADMIN — TRAZODONE HYDROCHLORIDE 50 MG: 50 TABLET ORAL at 08:01

## 2024-01-03 RX ADMIN — INSULIN ASPART 4 UNITS: 100 INJECTION, SOLUTION INTRAVENOUS; SUBCUTANEOUS at 12:01

## 2024-01-03 RX ADMIN — INSULIN ASPART 4 UNITS: 100 INJECTION, SOLUTION INTRAVENOUS; SUBCUTANEOUS at 04:01

## 2024-01-03 RX ADMIN — AMIODARONE HYDROCHLORIDE 400 MG: 200 TABLET ORAL at 08:01

## 2024-01-03 RX ADMIN — GABAPENTIN 125 MG: 250 SOLUTION ORAL at 08:01

## 2024-01-03 RX ADMIN — INSULIN ASPART 1 UNITS: 100 INJECTION, SOLUTION INTRAVENOUS; SUBCUTANEOUS at 08:01

## 2024-01-03 RX ADMIN — BARIUM SULFATE 25 ML: 400 SUSPENSION ORAL at 02:01

## 2024-01-03 RX ADMIN — SENNOSIDES AND DOCUSATE SODIUM 2 TABLET: 8.6; 5 TABLET ORAL at 09:01

## 2024-01-03 RX ADMIN — INSULIN ASPART 4 UNITS: 100 INJECTION, SOLUTION INTRAVENOUS; SUBCUTANEOUS at 05:01

## 2024-01-03 RX ADMIN — VENLAFAXINE 37.5 MG: 37.5 TABLET ORAL at 08:01

## 2024-01-03 NOTE — CONSULTS
Please see initial consult by ALETHA Frost on 1/2/24.    Hugh Gross MD  \[facility]\""-[facility], PGY-IV

## 2024-01-03 NOTE — H&P (VIEW-ONLY)
Please see initial consult by ALETHA Frost on 1/2/24.    Hugh Gross MD  [facility], PGY-IV

## 2024-01-03 NOTE — PT/OT/SLP PROGRESS
Occupational Therapy   Treatment    Name: Radha Abbott  MRN: [id number]  Admitting Diagnosis:  LVAD (left ventricular assist device) present  30 Days Post-Op    Recommendations:     Discharge Recommendations: High Intensity Therapy  Discharge Equipment Recommendations:  to be determined by next level of care  Barriers to discharge:  None    Assessment:     Radha Abbott is a 63 y.o. male with a medical diagnosis of LVAD (left ventricular assist device) present.  He presents with noted improvements in FM coordination as demonstrated by improvement with LVAD management. Patient demonstrating good dynamic sitting balance EOB, requiring CGA and cueing today. Performance deficits affecting function are weakness, impaired endurance, impaired self care skills, impaired functional mobility, gait instability, impaired balance, decreased safety awareness, decreased upper extremity function, decreased lower extremity function, pain, impaired skin.     Rehab Prognosis:  Good; patient would benefit from acute skilled OT services to address these deficits and reach maximum level of function.       Plan:     Patient to be seen 6 x/week to address the above listed problems via self-care/home management, therapeutic activities, therapeutic exercises, neuromuscular re-education  Plan of Care Expires: 02/03/24  Plan of Care Reviewed with: patient, caregiver    Subjective     Chief Complaint: R foot pain  Patient/Family Comments/goals: get better and go home  Pain/Comfort:  Pain Rating 1: 5/10  Location - Side 1: Right  Location - Orientation 1: generalized  Location 1: foot  Pain Addressed 1: Reposition, Distraction  Pain Rating Post-Intervention 1: 5/10    Objective:     Communicated with: nursing prior to session.  Patient found HOB elevated with telemetry, NG tube, LVAD upon OT entry to room. Patient's wife present in room during session.     General Precautions: Standard, fall, LVAD    Orthopedic Precautions:N/A  Braces:  "N/A  Respiratory Status: Room air     Occupational Performance:     Bed Mobility:    Patient completed Supine to Sit with minimum assistance   Patient sat EOB for ~ 25 minutes with Contact Guard Assistance      Functional Mobility/Transfers:  Patient completed Sit <> Stand Transfer with maximal assistance  with  hand-held assist   Patient completed Bed <> Chair Transfer using Stand Pivot technique with maximal assistance with hand-held assist    Activities of Daily Living:  Grooming: stand by assistance to complete oral hygiene via rung oral swab  Lower Body Dressing: maximal assistance to don socks      AMPAC 6 Click ADL: 12    Treatment & Education:  Pt seen for education with LVAD management to promote independence with switching from wall power > battery power. Steps and level of support described as follows: overall minimal assistance required  Verbal identification of parts: required cueing for "power cables" 2/3  Completing self test: previously completed; able to identify correct button  Writing self-test values into binder: previously completed by wife  Check battery charge: independent  Place batteries in clips: independent  Disconnecting and reconnect power cables in correct order (white then black) : minimal assistance required only for initial twisting of power cable  Ensuring lines untangled: independent  Controller in front :  independent    Patient educated on:   -purpose of OT and OT POC  -facilitation and education on proper body mechanics, energy conservation, and safety  -importance of early mobility and out of bed activities with staff assist  -overall benefits of therapy     All questions answered within OT scope and to patient's satisfaction    Patient left up in chair with all lines intact, call button in reach, and wife present    GOALS:   Multidisciplinary Problems       Occupational Therapy Goals          Problem: Occupational Therapy    Goal Priority Disciplines Outcome Interventions "   Occupational Therapy Goal     OT, PT/OT Ongoing, Progressing    Description: Goals to be met by: 2/3/24    Patient will increase functional independence with ADLs by performing:    UB Dressing with SBA  LE Dressing with Supervision.  Grooming while standing at sink with Supervision.  Toileting from bedside commode with SBA for hygiene and clothing management.   Step transfer to bedside commode with SBA  Harding with VAD management during ADLs                             Time Tracking:     OT Date of Treatment: 01/03/24  OT Start Time: 0852  OT Stop Time: 0923  OT Total Time (min): 31 min    Billable Minutes:Self Care/Home Management 10  Therapeutic Activity 21    OT/PRIETO: OT     Number of PRIETO visits since last OT visit: 0    1/3/2024

## 2024-01-03 NOTE — PROGRESS NOTES
Pt and wife AAAO.  Spent 45 minutes working with pt on VAD education.  He was AAAO.  He participated and had great recall,  We reviewed batteries, alarms, emergency bag, how/when/who to call.  Pt successfully practice paging VAD coordinator on call.  Wife very supportive as well.      I returned at 2 pm and spent another 45 minutes at bedside.  Again, pt and wife AAAO.  Pt still with good recall from earlier.  He and wife successfully practiced changing controllers.  Educated wife on checking modular cable connection and that pt can have CPR if needed.      Further education required.  Will continue to work with pt this week.

## 2024-01-03 NOTE — SUBJECTIVE & OBJECTIVE
Patient History               Medical as of 1/2/2024       Past Medical History       Diagnosis Date Comments Source    CAD (coronary artery disease) -- -- Patient    Diabetes mellitus -- -- Provider    HFrEF (heart failure with reduced ejection fraction) -- -- Patient    ICD (implantable cardioverter-defibrillator) in place -- -- Patient    MI, old -- -- Provider                          Surgical as of 1/2/2024       Past Surgical History       Procedure Laterality Date Comments Source    CARDIAC SURGERY -- -- -- Provider    RIGHT HEART CATHETERIZATION Right 10/10/2023 Procedure: INSERTION, CATHETER, RIGHT HEART;  Surgeon: Bin Gandhi MD;  Location: [facility] CATH LAB;  Service: Cardiology;  Laterality: Right; Provider    RIGHT HEART CATHETERIZATION Right 10/13/2023 Procedure: INSERTION, CATHETER, RIGHT HEART;  Surgeon: Walter Mcintyre MD;  Location: [facility] CATH LAB;  Service: Cardiology;  Laterality: Right; Provider    RIGHT HEART CATHETERIZATION -- 11/13/2023 -- Provider    RIGHT HEART CATHETERIZATION Right 11/13/2023 Procedure: INSERTION, CATHETER, RIGHT HEART;  Surgeon: Juventino Bermudez Jr., MD;  Location: [facility] CATH LAB;  Service: Cardiology;  Laterality: Right; Provider    RIGHT HEART CATHETERIZATION Right 11/20/2023 Procedure: INSERTION, CATHETER, RIGHT HEART;  Surgeon: Sajan Hurley MD;  Location: [facility] CATH LAB;  Service: Cardiology;  Laterality: Right; Provider    PLACEMENT OF SWAN ROLANDO CATHETER WITH IMAGING GUIDANCE -- 11/20/2023 Procedure: INSERTION, CATHETER, SWAN-ROLANDO, WITH IMAGING GUIDANCE;  Surgeon: Sajan Hurley MD;  Location: [facility] CATH LAB;  Service: Cardiology;; Provider    INSERTION OF INTRA-AORTIC BALLOON ASSIST DEVICE Right 11/21/2023 Procedure: INSERTION, INTRA-AORTIC BALLOON PUMP;  Surgeon: Finn Cohn MD;  Location: [facility] CATH LAB;  Service: Cardiology;  Laterality: Right; Provider    LEFT VENTRICULAR ASSIST DEVICE Left 12/1/2023 Procedure: INSERTION-LEFT  VENTRICULAR ASSIST DEVICE;  Surgeon: Yuri Washington MD;  Location: [facility] OR 2ND FLR;  Service: Cardiovascular;  Laterality: Left;  REDO STERNOTOMY - REDO SAW NEEDED FOR CASE Provider    STERNOTOMY N/A 12/1/2023 Procedure: STERNOTOMY, REDO;  Surgeon: Yuri Washington MD;  Location: [facility] OR 2ND FLR;  Service: Cardiovascular;  Laterality: N/A; Provider    VALVULOPLASTY, MITRAL VALVE N/A 12/1/2023 Procedure: VALVULOPLASTY, MITRAL VALVE;  Surgeon: Yuri Washington MD;  Location: [facility] OR 2ND FLR;  Service: Cardiovascular;  Laterality: N/A; Provider    LYSIS OF ADHESIONS -- 12/1/2023 Procedure: LYSIS, ADHESIONS;  Surgeon: Yuri Washington MD;  [facility];  Service: Cardiovascular;; Provider    AORTIC VALVULOPLASTY N/A 12/1/2023 Procedure: REPAIR, AORTIC VALVE;  Surgeon: Yuri Washington MD;  Location: [facility] OR [facility] FLR;  Service: Cardiovascular;  Laterality: N/A; Provider    CLOSURE N/A 12/1/2023 Procedure: CLOSURE, TEMPORARY;  Surgeon: Yuri Washington MD;  Location: [facility] OR 2ND FLR;  Service: Cardiovascular;  Laterality: N/A; Provider    ANGIOPLASTY-VENOUS ARTERY Right 12/1/2023 Procedure: ANGIOPLASTY-VENOUS ARTERY, RIGHT FEMORAL;  Surgeon: Yuir Washington MD;  [facility];  Service: Cardiovascular;  Laterality: Right; Provider    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 12/1/2023 Procedure: REPAIR, ANEURYSM, ARTERY, FEMORAL;  Surgeon: Yuri Washington MD;  Location: [facility] OR [facility] FLR;  Service: Cardiovascular;  Laterality: Right;  Right Femoral Artery Repair Provider    STERNAL WOUND CLOSURE N/A 12/4/2023 Procedure: CLOSURE, WOUND, STERNUM;  Surgeon: Yuri Washington MD;  Location: [facility] OR 2ND FLR;  Service: Cardiovascular;  Laterality: N/A; Provider    INSERTION OF GRAFT TO PERICARDIUM -- 12/4/2023 Procedure: INSERTION, GRAFT, PERICARDIUM;  Surgeon: Yuri Washington MD;  Location: [facility] OR 2ND FLR;  Service: Cardiovascular;; Provider    DRAINAGE OF PLEURAL EFFUSION -- 12/4/2023 Procedure: DRAINAGE,  PLEURAL EFFUSION;  Surgeon: Yuri Washington MD;  Location: [facility] OR [address] [place] [address];  Service: Cardiovascular;; Provider                          Family as of 1/2/2024    None               Tobacco Use as of 1/2/2024       Smoking Status Smoking Start Date Last Attempt to Quit Current Packs/Day Average Packs/Day    Every Day -- -- 0.5    Pack Year History   Packs/Day From To Years    0.5 -- -- 0.0      Smokeless Status Smokeless Type Smokeless Quit Date    Unknown -- --      Source    Provider                  Alcohol Use as of 1/2/2024       Alcohol Use Drinks/Week Alcohol/Week Comments Source    Yes   -- rarely Provider                  Drug Use as of 1/2/2024       Drug Use Types Frequency Comments Source    No -- -- -- Provider                  Sexual Activity as of 1/2/2024       Sexually Active Birth Control Partners Comments Source    -- -- -- -- Provider                  Activities of Daily Living as of 1/2/2024    None               Social Documentation as of 1/2/2024    None               Occupational as of 1/2/2024    None               Socioeconomic as of 1/2/2024       Marital Status Spouse Name Number of Children Years Education Education Level Preferred Language Ethnicity Race Source     -- -- -- -- English Not  or /a Black or  --                  Pertinent History       Question Response Comments    Lives with -- --    Place in Birth Order -- --    Lives in -- --    Number of Siblings -- --    Raised by -- --    Legal Involvement -- --    Childhood Trauma -- --    Criminal History of -- --    Financial Status -- --    Highest Level of Education -- --    Does patient have access to a firearm? -- --     Service -- --    Primary Leisure Activity -- --    Spirituality -- --          Past Medical History:   Diagnosis Date    CAD (coronary artery disease)     Diabetes mellitus     HFrEF (heart failure with reduced ejection fraction)     ICD (implantable  cardioverter-defibrillator) in place     MI, old      Past Surgical History:   Procedure Laterality Date    ANGIOPLASTY-VENOUS ARTERY Right 12/1/2023    Procedure: ANGIOPLASTY-VENOUS ARTERY, RIGHT FEMORAL;  Surgeon: Yuri Washington MD;  Location: [facility] OR [facility] FLR;  Service: Cardiovascular;  Laterality: Right;    AORTIC VALVULOPLASTY N/A 12/1/2023    Procedure: REPAIR, AORTIC VALVE;  Surgeon: Yuri Washington MD;  [facility];  Service: Cardiovascular;  Laterality: N/A;    CARDIAC SURGERY      CLOSURE N/A 12/1/2023    Procedure: CLOSURE, TEMPORARY;  Surgeon: Yuri Washington MD;  Location: [facility] OR [facility] FLR;  Service: Cardiovascular;  Laterality: N/A;    DRAINAGE OF PLEURAL EFFUSION  12/4/2023    Procedure: DRAINAGE, PLEURAL EFFUSION;  Surgeon: Yuri Washington MD;  [facility];  Service: Cardiovascular;;    INSERTION OF GRAFT TO PERICARDIUM  12/4/2023    Procedure: INSERTION, GRAFT, PERICARDIUM;  Surgeon: Yuri Washington MD;  [facility];  Service: Cardiovascular;;    INSERTION OF INTRA-AORTIC BALLOON ASSIST DEVICE Right 11/21/2023    Procedure: INSERTION, INTRA-AORTIC BALLOON PUMP;  Surgeon: Finn Cohn MD;  Location: [facility] CATH LAB;  Service: Cardiology;  Laterality: Right;    LEFT VENTRICULAR ASSIST DEVICE Left 12/1/2023    Procedure: INSERTION-LEFT VENTRICULAR ASSIST DEVICE;  Surgeon: Yuri Washington MD;  [facility];  Service: Cardiovascular;  Laterality: Left;  REDO STERNOTOMY - REDO SAW NEEDED FOR CASE    LYSIS OF ADHESIONS  12/1/2023    Procedure: LYSIS, ADHESIONS;  Surgeon: Yuri Washington MD;  Location: [facility] OR [facility] FLR;  Service: Cardiovascular;;    PLACEMENT OF SWAN ROLANDO CATHETER WITH IMAGING GUIDANCE  11/20/2023    Procedure: INSERTION, CATHETER, SWAN-ROLANDO, WITH IMAGING GUIDANCE;  Surgeon: Sajan Hurley MD;  Location: [facility] CATH LAB;  Service: Cardiology;;    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 12/1/2023    Procedure: REPAIR,  ANEURYSM, ARTERY, FEMORAL;  Surgeon: Yuri Washington MD;  [facility];  Service: Cardiovascular;  Laterality: Right;  Right Femoral Artery Repair    RIGHT HEART CATHETERIZATION Right 10/10/2023    Procedure: INSERTION, CATHETER, RIGHT HEART;  Surgeon: Bin Gandhi MD;  [facility] CATH LAB;  Service: Cardiology;  Laterality: Right;    RIGHT HEART CATHETERIZATION Right 10/13/2023    Procedure: INSERTION, CATHETER, RIGHT HEART;  Surgeon: Walter Mcintyre MD;  Location: [facility] CATH LAB;  Service: Cardiology;  Laterality: Right;    RIGHT HEART CATHETERIZATION  11/13/2023    RIGHT HEART CATHETERIZATION Right 11/13/2023    Procedure: INSERTION, CATHETER, RIGHT HEART;  Surgeon: Juventino Bermudez Jr., MD;  Location: [facility] CATH LAB;  Service: Cardiology;  Laterality: Right;    RIGHT HEART CATHETERIZATION Right 11/20/2023    Procedure: INSERTION, CATHETER, RIGHT HEART;  Surgeon: Sajan Hurley MD;  Location: [facility] CATH LAB;  Service: Cardiology;  Laterality: Right;    STERNAL WOUND CLOSURE N/A 12/4/2023    Procedure: CLOSURE, WOUND, STERNUM;  Surgeon: Yuri Washington MD;  [facility];  Service: Cardiovascular;  Laterality: N/A;    STERNOTOMY N/A 12/1/2023    Procedure: STERNOTOMY, REDO;  Surgeon: Yuri Washington MD;  [facility];  Service: Cardiovascular;  Laterality: N/A;    VALVULOPLASTY, MITRAL VALVE N/A 12/1/2023    Procedure: VALVULOPLASTY, MITRAL VALVE;  Surgeon: Yuri Washington MD;  [facility];  Service: Cardiovascular;  Laterality: N/A;     Family History    None       Tobacco Use    Smoking status: Every Day     Current packs/day: 0.50     Types: Cigarettes    Smokeless tobacco: Not on file   Substance and Sexual Activity    Alcohol use: Yes     Comment: rarely    Drug use: No    Sexual activity: Not on file     Review of patient's allergies indicates:  No Known Allergies    No current facility-administered medications on file prior to encounter.      Current Outpatient Medications on File Prior to Encounter   Medication Sig    amiodarone (PACERONE) 400 MG tablet Take 1 tablet (400 mg total) by mouth once daily.    apixaban (ELIQUIS) 5 mg Tab Take 1 tablet (5 mg total) by mouth 2 (two) times daily.    aspirin (ECOTRIN) 81 MG EC tablet Take 81 mg by mouth once daily.    atorvastatin (LIPITOR) 40 MG tablet Take 40 mg by mouth.    DOBUTamine (DOBUTREX) 1,000 mg/250 mL (4,000 mcg/mL) infusion Inject 389.5 mcg/min into the vein continuous.    fish oil-omega-3 fatty acids 300-1,000 mg capsule Take 2 g by mouth once daily.    furosemide (LASIX) 40 MG tablet Take 1 tablet (40 mg total) by mouth 2 (two) times daily.    gabapentin (NEURONTIN) 300 MG capsule Take 300 mg by mouth nightly as needed.    multivitamin capsule Take 1 capsule by mouth once daily.    pantoprazole (PROTONIX) 40 MG tablet Take 1 tablet (40 mg total) by mouth before breakfast.    potassium chloride SA (K-DUR,KLOR-CON) 20 MEQ tablet Take 1 tablet (20 mEq total) by mouth once daily.    sacubitriL-valsartan (ENTRESTO) 24-26 mg per tablet Take 1 tablet by mouth 2 (two) times daily.    metOLazone (ZAROXOLYN) 2.5 MG tablet Take 1 tablet (2.5 mg total) by mouth once daily. Thursday 11/9 and Saturday 11/10    torsemide (DEMADEX) 20 MG Tab Take 2 tablets (40 mg total) by mouth 2 (two) times a day.     Psychotherapeutics (From admission, onward)      Start     Stop Route Frequency Ordered    12/31/23 2100  traZODone tablet 50 mg         -- PER NG TUBE Nightly 12/31/23 0844    12/26/23 2114  zolpidem tablet 5 mg         -- Oral Nightly PRN 12/26/23 2015 12/20/23 1045  venlafaxine tablet 37.5 mg         -- PER G TUBE Daily 12/20/23 0932          Review of Systems  Strengths and Liabilities: Strength: Patient accepts guidance/feedback, Strength: Patient is expressive/articulate., Liability: Patient lacks coping skills.    Objective:     Vital Signs (Most Recent):  Temp: 98.1 °F (36.7 °C) (01/02/24  1537)  Pulse: 94 (01/02/24 1800)  Resp: 18 (01/02/24 1550)  BP: 101/77 (01/02/24 1745)  SpO2: 100 % (01/02/24 1550) Vital Signs (24h Range):  Temp:  [98 °F (36.7 °C)-99.1 °F (37.3 °C)] 98.1 °F (36.7 °C)  Pulse:  [] 94  Resp:  [16-18] 18  SpO2:  [94 %-100 %] 100 %  BP: ()/(0-82) 101/77     Height: 6' (182.9 cm)  Weight: 82.1 kg (181 lb)  Body mass index is 24.55 kg/m².      Intake/Output Summary (Last 24 hours) at 1/2/2024 1806  Last data filed at 1/2/2024 0439  Gross per 24 hour   Intake 921 ml   Output 0 ml   Net 921 ml          Physical Exam        Significant Labs: All pertinent labs within the past 24 hours have been reviewed.    Significant Imaging: I have reviewed all pertinent imaging results/findings within the past 24 hours.

## 2024-01-03 NOTE — ASSESSMENT & PLAN NOTE
-Psychiatry consulted for depressed mood, SI when left alone  -Recommend sitter when alone (possibly with transition to stepdown).  -Psych reconsulted 1/2, agree with venlafaxine and trazadone

## 2024-01-03 NOTE — PLAN OF CARE
Problem: Occupational Therapy  Goal: Occupational Therapy Goal  Description: Goals to be met by: 2/3/24    Patient will increase functional independence with ADLs by performing:    UB Dressing with SBA  LE Dressing with Supervision.  Grooming while standing at sink with Supervision.  Toileting from bedside commode with SBA for hygiene and clothing management.   Step transfer to bedside commode with SBA  Brown with VAD management during ADLs        Outcome: Ongoing, Progressing   Goals still appropriate. Continue POC

## 2024-01-03 NOTE — PT/OT/SLP PROGRESS
Physical Therapy Treatment    Patient Name:  Radha Abbott   MRN:  [id number]    Recommendations:     Discharge Recommendations: High Intensity Therapy  Discharge Equipment Recommendations:  (will determine DME needs closer to discharge)  Barriers to discharge: Decreased caregiver support    Assessment:     Radha Abbott is a 63 y.o. male admitted with a medical diagnosis of LVAD (left ventricular assist device) present.  He presents with the following impairments/functional limitations: weakness, gait instability, impaired endurance, impaired balance, decreased lower extremity function, impaired functional mobility, decreased coordination, decreased safety awareness pt tolerated treatment better being able to gait train farther. Pt cont to perform at low functional level. Pt will benefit from cont skilled PT 6x/wk to progress physically.  Pt is significantly below previous functional level, increased risk of falls and increased burden of care currently. Patient has demonstrated sufficient progression to warrant high intensity therapy evidenced by objectives noted below. Pt is s/p LVAD placement 12/1 and chest closure 12/4/23.    Rehab Prognosis: Good; patient would benefit from acute skilled PT services to address these deficits and reach maximum level of function.    Recent Surgery: Procedure(s) (LRB):  CLOSURE, WOUND, STERNUM (N/A)  INSERTION, GRAFT, PERICARDIUM  DRAINAGE, PLEURAL EFFUSION 30 Days Post-Op    Plan:     During this hospitalization, patient to be seen 6 x/week to address the identified rehab impairments via gait training, therapeutic activities, therapeutic exercises and progress toward the following goals:    Plan of Care Expires:  02/05/24    Subjective     Chief Complaint: pt c/o being tired during treatment.   Patient/Family Comments/goals:  to get stronger and go home.   Pain/Comfort:  Pain Rating 1: 0/10  Pain Rating Post-Intervention 1: 0/10      Objective:     Communicated with nurse  prior to  session.  Patient found up in chair with telemetry, LVAD, PICC line, NG tube upon PT entry to room.     General Precautions: Standard, fall, LVAD, sternal  Orthopedic Precautions: N/A  Braces: N/A  Respiratory Status: Room air     Functional Mobility:  Transfers:   pt needed verbal cues for functional mobility with sternal precautions.   Sit to Stand:  moderate assistance and of 2 persons with B platform RW x 1 rep and min assist x 2 sit to stand x 2 reps.    Gait: pt received gait training ~ 24 ft, 26, ft 36 ft  (86 ft total) with B platform RW, CGA and sitting rest periods between distance gait trained. Pt was additional assist of 1 to follow with chair and had emergency bag present.  Pt needed frequent verbal cues for upright posture, holding head upright and shoulders retracted with gait training.       AM-PAC 6 CLICK MOBILITY  Turning over in bed (including adjusting bedclothes, sheets and blankets)?: 2  Sitting down on and standing up from a chair with arms (e.g., wheelchair, bedside commode, etc.): 2  Moving from lying on back to sitting on the side of the bed?: 2  Moving to and from a bed to a chair (including a wheelchair)?: 2  Need to walk in hospital room?: 2  Climbing 3-5 steps with a railing?: 1  Basic Mobility Total Score: 11       Treatment & Education:  Pt and wife received verbal instructions in PT POC and both verbally expressed understanding of such.     Patient left up in chair with all lines intact, call button in reach, and wife present..    GOALS:   Description: Goals to be met by: 23      Patient will increase functional independence with mobility by performin. Sit to stand transfer with modified independence  2. Bed to chair transfer with supervision  3. Gait  x 150 feet with supervision using physician approved AD with standing rest breaks prn.   4. Lower extremity exercise program x30 reps per handout, with independence  5. Recite 3/3 sternal precautions and remain complaint  with precautions throughout session with no verbal cues    Time Tracking:     PT Received On: 01/03/24  PT Start Time: 0926     PT Stop Time: 0949  PT Total Time (min): 23 min     Billable Minutes: Gait Training 23 min     Treatment Type: Treatment  PT/PTA: PT     Number of PTA visits since last PT visit: 0     01/03/2024

## 2024-01-03 NOTE — HPI
"CONSULTATION LIAISON PSYCHIATRY INITIAL EVALUATION    Patient Name: Radha Abbott  MRN: [id number]  Patient Class: IP- Inpatient  Admission Date: 11/9/2023  Attending Physician: Brayan Ocampo MD      HPI:   Radha Abbott is a 63 y.o. male with no past psychiatric history & past pertinent medical history of  LVAD (left ventricular assist device), hx of CAD s/p 3v CABG (unclear anatomy, 2009), ICM with a recent EF of 15-20% s/p ICD ([employer] 2009), DM2 (a1c 7.7), HTN, HLD, Vfib on amio, chronic anemia, acute on chronic CKD  who initally presented to the ED with CC of SO and now he presents for psychiatric inpatient psychiatric consult. Psychiatry consulted was requested for assessment of MDD vs Adjustment d/o symptoms    On psych exam, Mr. Garcia was laying his bed with his wife at his bedside. Mr. Abbott was awake, alert, oriented to time, place, person, and date. He endorsed sadness, low mood stemming from his current hospitalization. He denied current suicidal ideation (SI) and no suicide plan or intent currently.  When asked about voicing suicidal ideation earlier he states he was "confused" and does not recall making earlier statements of SI. He states his mood is good, is happy when his wife is around and when friends and family visit him. He denies past history of SI/HI/AH/VH. He denied past history of depression, anxiety, jenna, hypomania, hallucinations, and denied substance use.       Collateral with patient's permission:   Spouse states patient didn't have any past psychiatric history and he never tried any type of psychotropics prior to this hospitalization.     Medical Review of Systems:  Pertinent items are noted in HPI.    Review of Systems   Constitutional: Negative.    HENT: Negative.     Eyes: Negative.    Respiratory: Negative.     Cardiovascular: Negative.    Gastrointestinal: Negative.    Genitourinary: Negative.    Musculoskeletal: Negative.    Skin: Negative.    Neurological: Negative.  "   Endo/Heme/Allergies: Negative.    Psychiatric/Behavioral: Negative.          Adjustment disorder with depressed mood   All other systems reviewed and are negative.        Psychiatric Review of Systems (is patient experiencing or having changes in):  sleep: no. Sleep well with trazodone now  appetite: no change  weight: no  energy/anergy: no  interest/pleasure/anhedonia: no  somatic symptoms: no  libido: no  anxiety/panic: yes, feels some anxiety regarding hospitalization and wants to go home  guilty/hopelessness: no  concentration: no  Airam:no  Psychosis: no  Trauma: no  S.I.B.s/risky behavior: no    Past Psychiatric History:  Previous Medication Trials: no but now taking trazodone 50 mg po qhs and venlafaxine 37.5 mg po daily  Previous Psychiatric Hospitalizations:no   Previous Suicide Attempts: no  History of Violence: no  Outpatient Psychiatrist: no  Family Psychiatric History: no    Substance Abuse History (with emphasis over the last 12 months):  Recreational Drugs: Denies  Use of Alcohol: occasional, social use  Tobacco Use:yes  Rehab History:no    Social History:  Marital Status:   Children: 5  Employment Status/Info: currently employed  :no  Education: high school diploma/GED  Special Ed: no  Housing Status: with spouse  Access to gun: yes, states they are locked up at home.  Psychosocial Stressors: health  Functioning Relationships: good relationship with spouse or significant other    Legal History:  Past Charges/Incarcerations: no  Pending charges:no    Mental Status Exam:  General Appearance: appears stated age, normal weight, appropriately dressed  Behavior: normal; cooperative; reasonably friendly, pleasant, and polite; appropriate eye-contact; under good behavioral control  Involuntary Movements and Motor Activity: no abnormal involuntary movements noted; no tics, no tremors, no akathisia, no dystonia, no evidence of tardive dyskinesia; no psychomotor agitation or retardation  Gait  "and Station: unable to assess - patient lying down or seated  Speech and Language: intact; normal rate, rhythm, volume, tone, and pitch; conversational, spontaneous, and coherent; speaks and understands English proficiently and fluently; repeats words and phrases, no word finding difficulties are noted  Mood: "good"  Affect: Appropriate  Thought Process and Associations: intact; linear, goal-directed, organized, and logical; no loosening of associations noted  Thought Content and Perceptions:: no suicidal or homicidal ideation, no auditory or visual hallucinations, no paranoid ideation, no ideas of reference, no evidence of delusions or psychosis  Sensorium and Orientation: oriented fully (to person, place, time and situation)  Recent and Remote Memory: He was not able to recall 3 items in 3 minutes   Attention and Concentration: intact, attentive to conversation  Fund of Knowledge: Fair he was only able to recall current PresidentZainab  Insight: Fair per patient understanding of his symptoms  Judgment: intact per patient behavior and per HPI      ASSESSMENT & RECOMMENDATIONS    Per assessment patient presented as sad due to his illness and prolonged hospitalization. Patient denied previous psychiatric history and no previous depressive episodes prior to hospitalizations.  Patient meets the criteria for adjustment disorder with depressed mood    Adjustment Disorder w/ Depressed Mood  PSYCH MEDICATIONS   Continue scheduled  Venlafaxine 37.5mg po daily for depressed mood due to AD  Trazodone 50mg po qhs      RISK ASSESSMENT  NO NEED FOR PEC patient NOT in any imminent danger of hurting self or others and not gravely disabled.     FOLLOW UP  Will follow up while in house  Will sign off. Patient can follow up with outpatient mental health provider after discharge. Resources provided in patient's discharge instructions.    Please contact ON CALL psychiatry service (24/7) for any acute issues that may arise.    Bri " ALETHA Frost  CL Psychiatry  [facility]  1/2/2024 3:20 PM        --------------------------------------------------------------------------------------------------------------------------------------------------------------------------------------------------------------------------------------    CONTINUED HISTORY & OBJECTIVE clinical data & findings reviewed and noted for above decision making    Past Medical/Surgical History:   Past Medical History:   Diagnosis Date    CAD (coronary artery disease)     Diabetes mellitus     HFrEF (heart failure with reduced ejection fraction)     ICD (implantable cardioverter-defibrillator) in place     MI, old      Past Surgical History:   Procedure Laterality Date    ANGIOPLASTY-VENOUS ARTERY Right 12/1/2023    Procedure: ANGIOPLASTY-VENOUS ARTERY, RIGHT FEMORAL;  Surgeon: Yuri Washington MD;  Location: [address];  Service: Cardiovascular;  Laterality: Right;    AORTIC VALVULOPLASTY N/A 12/1/2023    Procedure: REPAIR, AORTIC VALVE;  Surgeon: Yuri Washington MD;  Location: [facility] OR [address] [place] [address];  Service: Cardiovascular;  Laterality: N/A;    CARDIAC SURGERY      CLOSURE N/A 12/1/2023    Procedure: CLOSURE, TEMPORARY;  Surgeon: Yuri Washington MD;  Location: [facility] OR [address] [place] [address];  Service: Cardiovascular;  Laterality: N/A;    DRAINAGE OF PLEURAL EFFUSION  12/4/2023    Procedure: DRAINAGE, PLEURAL EFFUSION;  Surgeon: Yuri Washington MD;  Location: [facility] OR 2ND FLR;  Service: Cardiovascular;;    INSERTION OF GRAFT TO PERICARDIUM  12/4/2023    Procedure: INSERTION, GRAFT, PERICARDIUM;  Surgeon: Yuri Washington MD;  Location: [facility] OR [facility] FLR;  Service: Cardiovascular;;    INSERTION OF INTRA-AORTIC BALLOON ASSIST DEVICE Right 11/21/2023    Procedure: INSERTION, INTRA-AORTIC BALLOON PUMP;  Surgeon: Finn Cohn MD;  Location: [facility] CATH LAB;  Service: Cardiology;  Laterality: Right;    LEFT VENTRICULAR ASSIST DEVICE Left 12/1/2023    Procedure: INSERTION-LEFT  VENTRICULAR ASSIST DEVICE;  Surgeon: Yuri Washington MD;  Location: [facility] OR [facility] FLR;  Service: Cardiovascular;  Laterality: Left;  REDO STERNOTOMY - REDO SAW NEEDED FOR CASE    LYSIS OF ADHESIONS  12/1/2023    Procedure: LYSIS, ADHESIONS;  Surgeon: Yuri Washington MD;  Location: [address] FLR;  Service: Cardiovascular;;    PLACEMENT OF SWAN ROLANDO CATHETER WITH IMAGING GUIDANCE  11/20/2023    Procedure: INSERTION, CATHETER, SWAN-ROLANDO, WITH IMAGING GUIDANCE;  Surgeon: Sajan Hurley MD;  Location: [facility] CATH LAB;  Service: Cardiology;;    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 12/1/2023    Procedure: REPAIR, ANEURYSM, ARTERY, FEMORAL;  Surgeon: Yuri Washington MD;  [address];  Service: Cardiovascular;  Laterality: Right;  Right Femoral Artery Repair    RIGHT HEART CATHETERIZATION Right 10/10/2023    Procedure: INSERTION, CATHETER, RIGHT HEART;  Surgeon: Bin Gandhi MD;  Location: [facility] CATH LAB;  Service: Cardiology;  Laterality: Right;    RIGHT HEART CATHETERIZATION Right 10/13/2023    Procedure: INSERTION, CATHETER, RIGHT HEART;  Surgeon: Walter Mcintyre MD;  Location: [facility] CATH LAB;  Service: Cardiology;  Laterality: Right;    RIGHT HEART CATHETERIZATION  11/13/2023    RIGHT HEART CATHETERIZATION Right 11/13/2023    Procedure: INSERTION, CATHETER, RIGHT HEART;  Surgeon: Juventino Bermudez Jr., MD;  Location: [facility] CATH LAB;  Service: Cardiology;  Laterality: Right;    RIGHT HEART CATHETERIZATION Right 11/20/2023    Procedure: INSERTION, CATHETER, RIGHT HEART;  Surgeon: Sajan Hurley MD;  Location: [facility] CATH LAB;  Service: Cardiology;  Laterality: Right;    STERNAL WOUND CLOSURE N/A 12/4/2023    Procedure: CLOSURE, WOUND, STERNUM;  Surgeon: Yuri Washington MD;  [address];  Service: Cardiovascular;  Laterality: N/A;    STERNOTOMY N/A 12/1/2023    Procedure: STERNOTOMY, REDO;  Surgeon: Yuri Washington MD;  [facility];  Service: Cardiovascular;   Laterality: N/A;    VALVULOPLASTY, MITRAL VALVE N/A 12/1/2023    Procedure: VALVULOPLASTY, MITRAL VALVE;  Surgeon: Yuri Washington MD;  Location: [facility] OR [address] [place] [address];  Service: Cardiovascular;  Laterality: N/A;       Current Medications:   Scheduled Meds:    sodium chloride 0.9%   Intravenous Once    acetaminophen  1,000 mg Per NG tube TID    amiodarone  400 mg Per NG tube Daily    balsam peru-castor oiL   Topical (Top) BID    famotidine  20 mg Per G Tube Daily    gabapentin  125 mg Per NG tube Q12H    insulin aspart U-100  4 Units Subcutaneous 6 times per day    polyethylene glycol  17 g Per NG tube Daily    psyllium husk (aspartame)  1 packet Per NG tube Daily    senna-docusate 8.6-50 mg  2 tablet Per NG tube Daily    traZODone  50 mg Per NG tube QHS    venlafaxine  37.5 mg Per G Tube Daily    warfarin  2.5 mg Per G Tube Every Mon, Wed, Fri    warfarin  5 mg Per G Tube Every Tues, Thurs, Sat, Sun     PRN Meds: bisacodyL, dextrose 10 % in water (D10W), dextrose 10%, dextrose 10%, glucagon (human recombinant), heparin (porcine), hydrALAZINE, insulin aspart U-100, ondansetron, Flushing PICC/Midline Protocol **AND** [DISCONTINUED] sodium chloride 0.9% **AND** sodium chloride 0.9%, traMADoL, zolpidem    Allergies:   Review of patient's allergies indicates:  No Known Allergies    Vitals  Vitals:    01/02/24 1600   BP:    Pulse: 91   Resp:    Temp:        Labs/Imaging/Studies:  Recent Results (from the past 24 hour(s))   POCT glucose    Collection Time: 01/01/24  5:14 PM   Result Value Ref Range    POCT Glucose 161 (H) 70 - 110 mg/dL   POCT glucose    Collection Time: 01/01/24  7:55 PM   Result Value Ref Range    POCT Glucose 132 (H) 70 - 110 mg/dL   POCT glucose    Collection Time: 01/02/24 12:19 AM   Result Value Ref Range    POCT Glucose 146 (H) 70 - 110 mg/dL   POCT glucose    Collection Time: 01/02/24  4:30 AM   Result Value Ref Range    POCT Glucose 151 (H) 70 - 110 mg/dL   Lactate dehydrogenase    Collection Time:  01/02/24  4:31 AM   Result Value Ref Range     (H) 110 - 260 U/L   Protime-INR    Collection Time: 01/02/24  4:31 AM   Result Value Ref Range    Prothrombin Time 22.4 (H) 9.0 - 12.5 sec    INR 2.2 (H) 0.8 - 1.2   Phosphorus    Collection Time: 01/02/24  4:31 AM   Result Value Ref Range    Phosphorus 2.7 2.7 - 4.5 mg/dL   Magnesium    Collection Time: 01/02/24  4:31 AM   Result Value Ref Range    Magnesium 2.1 1.6 - 2.6 mg/dL   Hepatic function panel    Collection Time: 01/02/24  4:31 AM   Result Value Ref Range    Total Protein 6.8 6.0 - 8.4 g/dL    Albumin 1.9 (L) 3.5 - 5.2 g/dL    Total Bilirubin 0.3 0.1 - 1.0 mg/dL    Bilirubin, Direct 0.2 0.1 - 0.3 mg/dL    AST 36 10 - 40 U/L    ALT 18 10 - 44 U/L    Alkaline Phosphatase 175 (H) 55 - 135 U/L   CBC auto differential    Collection Time: 01/02/24  4:31 AM   Result Value Ref Range    WBC 7.39 3.90 - 12.70 K/uL    RBC 2.41 (L) 4.60 - 6.20 M/uL    Hemoglobin 7.0 (L) 14.0 - 18.0 g/dL    Hematocrit 22.4 (L) 40.0 - 54.0 %    MCV 93 82 - 98 fL    MCH 29.0 27.0 - 31.0 pg    MCHC 31.3 (L) 32.0 - 36.0 g/dL    RDW 16.3 (H) 11.5 - 14.5 %    Platelets 450 150 - 450 K/uL    MPV 9.9 9.2 - 12.9 fL    Immature Granulocytes 0.4 0.0 - 0.5 %    Gran # (ANC) 5.8 1.8 - 7.7 K/uL    Immature Grans (Abs) 0.03 0.00 - 0.04 K/uL    Lymph # 0.7 (L) 1.0 - 4.8 K/uL    Mono # 0.7 0.3 - 1.0 K/uL    Eos # 0.1 0.0 - 0.5 K/uL    Baso # 0.04 0.00 - 0.20 K/uL    nRBC 0 0 /100 WBC    Gran % 78.2 (H) 38.0 - 73.0 %    Lymph % 9.9 (L) 18.0 - 48.0 %    Mono % 9.2 4.0 - 15.0 %    Eosinophil % 1.8 0.0 - 8.0 %    Basophil % 0.5 0.0 - 1.9 %    Differential Method Automated    Basic metabolic panel    Collection Time: 01/02/24  4:31 AM   Result Value Ref Range    Sodium 134 (L) 136 - 145 mmol/L    Potassium 4.1 3.5 - 5.1 mmol/L    Chloride 97 95 - 110 mmol/L    CO2 26 23 - 29 mmol/L    Glucose 140 (H) 70 - 110 mg/dL    BUN 78 (H) 8 - 23 mg/dL    Creatinine 5.2 (H) 0.5 - 1.4 mg/dL    Calcium 8.6 (L) 8.7 -  10.5 mg/dL    Anion Gap 11 8 - 16 mmol/L    eGFR 11.8 (A) >60 mL/min/1.73 m^2   POCT glucose    Collection Time: 01/02/24  8:25 AM   Result Value Ref Range    POCT Glucose 155 (H) 70 - 110 mg/dL   POCT glucose    Collection Time: 01/02/24 11:53 AM   Result Value Ref Range    POCT Glucose 198 (H) 70 - 110 mg/dL   Echo    Collection Time: 01/02/24  2:40 PM   Result Value Ref Range    BSA 2.04 m2    LVIDd 6.25 (A) 3.5 - 6.0 cm    LV Systolic Volume 147.01 mL    LV Systolic Volume Index 72.1 mL/m2    LVIDs 5.49 (A) 2.1 - 4.0 cm    LV Diastolic Volume 197.42 mL    LV Diastolic Volume Index 96.77 mL/m2    IVS 0.71 0.6 - 1.1 cm    LVOT diameter 2.19 cm    LVOT area 3.8 cm2    FS 12 (A) 28 - 44 %    Left Ventricle Relative Wall Thickness 0.20 cm    Posterior Wall 0.63 0.6 - 1.1 cm    LV mass 161.26 g    LV Mass Index 79 g/m2    MV Peak E Zeferino 0.71 m/s    TDI LATERAL 0.07 m/s    TDI SEPTAL 0.03 m/s    E/E' ratio 14.20 m/s    TR Max Zeferino 2.47 m/s    LV SEPTAL E/E' RATIO 23.67 m/s    LA Volume Index 63.7 mL/m2    LV LATERAL E/E' RATIO 10.14 m/s    LA volume 129.96 cm3    RVDD 3.19 cm    TAPSE 1.39 cm    LA size 4.70 cm    Left Atrium Minor Axis 5.59 cm    Left Atrium Major Axis 6.48 cm    LA volume (mod) 158.41 cm3    LA WIDTH 5.42 cm    LA Volume Index (Mod) 77.7 mL/m2    RA Major Axis 4.60 cm    RA Width 3.48 cm    Triscuspid Valve Regurgitation Peak Gradient 24 mmHg    Sinus 3.48 cm    STJ 2.52 cm    Mean e' 0.05 m/s    ZLVIDS 3.06     ZLVIDD 0.27      Imaging Results              X-Ray Chest AP Portable (Final result)  Result time 11/09/23 19:08:29      Final result by Norm Mccain MD (11/09/23 19:08:29)                   Impression:      As above      Electronically signed by: Norm Mccain MD  Date:    11/09/2023  Time:    19:08               Narrative:    EXAMINATION:  XR CHEST AP PORTABLE    CLINICAL HISTORY:  pulmonary edema;    TECHNIQUE:  Single frontal view of the chest was  performed.    COMPARISON:  11/09/2023    FINDINGS:  Right PICC catheter tip projects over the mid to distal SVC.  Left chest wall pacer stable.  There is patchy increased interstitial and parenchymal attenuation bilaterally, similar to the previous examination allowing for shallow inspiratory effort on current exam.  There is no pneumothorax or significant interval detrimental change in the cardiopulmonary status since the previous exam.                                       X-Ray Chest AP Portable (Final result)  Result time 11/09/23 09:56:37      Final result by Jake Gupta MD (11/09/23 09:56:37)                   Impression:      The cardiopericardial silhouette remains enlarged.    Mild pulmonary venous hypertension, possibly related to CHF.    Additional findings as above.      Electronically signed by: Jake Gupta  Date:    11/09/2023  Time:    09:56               Narrative:    EXAMINATION:  XR CHEST AP PORTABLE    CLINICAL HISTORY:  CHF;    TECHNIQUE:  Single frontal view of the chest was performed.    COMPARISON:  Portable chest x-ray 10/30/2023    FINDINGS:  Midline thoracic trachea, allowing for rightward rotation of the patient's torso.    The cardiopericardial silhouette remains enlarged, similar to the comparison study.    Left subclavian transvenous ICD, sternotomy wires, and visualized portions of a right upper extremity PICC line demonstrate no adverse interval changes from 10/30/2023.    Mildly engorged pulmonary vasculature, especially in the upper lobes.  Although patient positioning could contribute to this, some underlying pulmonary venous hypertension is not excluded.    Hazy increased opacities projecting over either lower lung are favored to be related to superimposed chest wall soft tissues.  Some new underlying pulmonary or pleural abnormalities, such as mild pulmonary edema, is not excluded.    No discrete pneumothorax.    No blunting of either lateral costophrenic  angle.    Degenerative changes in the suboptimally visualized spine.

## 2024-01-03 NOTE — PROGRESS NOTES
01/02/24 1837   Post-Hemodialysis Assessment   Rinseback Volume (mL) 250 mL   Blood Volume Processed (Liters) 44.6 L   Dialyzer Clearance Lightly streaked   Duration of Treatment 180 minutes   Additional Fluid Intake (mL) 250 mL   Total UF (mL) 1992 mL   Net Fluid Removal 1492   Patient Response to Treatment tolerated   Post-Hemodialysis Comments see notes     HD TX complete. Pt AAO, VSS, NAD. Net removal 1492 ml. Report given to primary care nurse at bedside.

## 2024-01-03 NOTE — CONSULTS
"[facility] - Cardiology Stepdown  Psychiatry  Consult Note    Patient Name: Radha Abbott  MRN: [id number]   Code Status: Full Code  Admission Date: 11/9/2023  Hospital Length of Stay: 54 days  Attending Physician: Brayan Ocampo MD  Primary Care Provider: Vasu Kong MD    Current Legal Status:  N/A    Patient information was obtained from patient, spouse/SO, and ER records.   Inpatient consult to Registered Dietitian/Nutritionist  Consult performed by: Bri Frost NP  Consult ordered by: Jimy An PA-C  Reason for consult: Adjustment disorder disorder with depressed mood        Subjective:     Principal Problem:LVAD (left ventricular assist device) present    Chief Complaint:  Major Depression Disorder VS Adjustment disorder    HPI: CONSULTATION LIAISON PSYCHIATRY INITIAL EVALUATION    Patient Name: Radha Abbott  MRN: [id number]  Patient Class: IP- Inpatient  Admission Date: 11/9/2023  Attending Physician: Brayan Ocampo MD      HPI:   Radha Abbott is a 63 y.o. male with no past psychiatric history & past pertinent medical history of  LVAD (left ventricular assist device), hx of CAD s/p 3v CABG (unclear anatomy, 2009), ICM with a recent EF of 15-20% s/p ICD ([employer] 2009), DM2 (a1c 7.7), HTN, HLD, Vfib on amio, chronic anemia, acute on chronic CKD  who initally presented to the ED with CC of SO and now he presents for psychiatric inpatient psychiatric consult. Psychiatry consulted was requested for assessment of MDD vs Adjustment d/o symptoms    On psych exam, Mr. Garcia was laying his bed with his wife at his bedside. Mr. Abbott was awake, alert, oriented to time, place, person, and date. He endorsed sadness, low mood stemming from his current hospitalization. He denied current suicidal ideation (SI) and no suicide plan or intent currently.  When asked about voicing suicidal ideation earlier he states he was "confused" and does not recall making earlier statements of SI. He states his mood is good, is " happy when his wife is around and when friends and family visit him. He denies past history of SI/HI/AH/VH. He denied past history of depression, anxiety, airam, hypomania, hallucinations, and denied substance use.       Collateral with patient's permission:   Spouse states patient didn't have any past psychiatric history and he never tried any type of psychotropics prior to this hospitalization.     Medical Review of Systems:  Pertinent items are noted in HPI.    Review of Systems   Constitutional: Negative.    HENT: Negative.     Eyes: Negative.    Respiratory: Negative.     Cardiovascular: Negative.    Gastrointestinal: Negative.    Genitourinary: Negative.    Musculoskeletal: Negative.    Skin: Negative.    Neurological: Negative.    Endo/Heme/Allergies: Negative.    Psychiatric/Behavioral: Negative.          Adjustment disorder with depressed mood   All other systems reviewed and are negative.        Psychiatric Review of Systems (is patient experiencing or having changes in):  sleep: no. Sleep well with trazodone now  appetite: no change  weight: no  energy/anergy: no  interest/pleasure/anhedonia: no  somatic symptoms: no  libido: no  anxiety/panic: yes, feels some anxiety regarding hospitalization and wants to go home  guilty/hopelessness: no  concentration: no  Airam:no  Psychosis: no  Trauma: no  S.I.B.s/risky behavior: no    Past Psychiatric History:  Previous Medication Trials: no but now taking trazodone 50 mg po qhs and venlafaxine 37.5 mg po daily  Previous Psychiatric Hospitalizations:no   Previous Suicide Attempts: no  History of Violence: no  Outpatient Psychiatrist: no  Family Psychiatric History: no    Substance Abuse History (with emphasis over the last 12 months):  Recreational Drugs: Denies  Use of Alcohol: occasional, social use  Tobacco Use:yes  Rehab History:no    Social History:  Marital Status:   Children: 5  Employment Status/Info: currently employed  :no  Education: high  "school diploma/GED  Special Ed: no  Housing Status: with spouse  Access to gun: yes, states they are locked up at home.  Psychosocial Stressors: health  Functioning Relationships: good relationship with spouse or significant other    Legal History:  Past Charges/Incarcerations: no  Pending charges:no    Mental Status Exam:  General Appearance: appears stated age, normal weight, appropriately dressed  Behavior: normal; cooperative; reasonably friendly, pleasant, and polite; appropriate eye-contact; under good behavioral control  Involuntary Movements and Motor Activity: no abnormal involuntary movements noted; no tics, no tremors, no akathisia, no dystonia, no evidence of tardive dyskinesia; no psychomotor agitation or retardation  Gait and Station: unable to assess - patient lying down or seated  Speech and Language: intact; normal rate, rhythm, volume, tone, and pitch; conversational, spontaneous, and coherent; speaks and understands English proficiently and fluently; repeats words and phrases, no word finding difficulties are noted  Mood: "good"  Affect: Appropriate  Thought Process and Associations: intact; linear, goal-directed, organized, and logical; no loosening of associations noted  Thought Content and Perceptions:: no suicidal or homicidal ideation, no auditory or visual hallucinations, no paranoid ideation, no ideas of reference, no evidence of delusions or psychosis  Sensorium and Orientation: oriented fully (to person, place, time and situation)  Recent and Remote Memory: He was not able to recall 3 items in 3 minutes   Attention and Concentration: intact, attentive to conversation  Fund of Knowledge: Fair he was only able to recall current PresidentZainab  Insight: Fair per patient understanding of his symptoms  Judgment: intact per patient behavior and per HPI      ASSESSMENT & RECOMMENDATIONS    Per assessment patient presented as sad due to his illness and prolonged hospitalization. Patient denied " previous psychiatric history and no previous depressive episodes prior to hospitalizations.  Patient meets the criteria for adjustment disorder with depressed mood    Adjustment Disorder w/ Depressed Mood  PSYCH MEDICATIONS   Continue scheduled  Venlafaxine 37.5mg po daily for depressed mood due to AD  Trazodone 50mg po qhs      RISK ASSESSMENT  NO NEED FOR PEC patient NOT in any imminent danger of hurting self or others and not gravely disabled.     FOLLOW UP  Will follow up while in house  Will sign off. Patient can follow up with outpatient mental health provider after discharge. Resources provided in patient's discharge instructions.    Please contact ON CALL psychiatry service (24/7) for any acute issues that may arise.    Bri Frost NP   Psychiatry  [facility]  1/2/2024 3:20 PM        --------------------------------------------------------------------------------------------------------------------------------------------------------------------------------------------------------------------------------------    CONTINUED HISTORY & OBJECTIVE clinical data & findings reviewed and noted for above decision making    Past Medical/Surgical History:   Past Medical History:   Diagnosis Date    CAD (coronary artery disease)     Diabetes mellitus     HFrEF (heart failure with reduced ejection fraction)     ICD (implantable cardioverter-defibrillator) in place     MI, old      Past Surgical History:   Procedure Laterality Date    ANGIOPLASTY-VENOUS ARTERY Right 12/1/2023    Procedure: ANGIOPLASTY-VENOUS ARTERY, RIGHT FEMORAL;  Surgeon: Yuri Washington MD;  Location: [facility] OR [address] [place] [address];  Service: Cardiovascular;  Laterality: Right;    AORTIC VALVULOPLASTY N/A 12/1/2023    Procedure: REPAIR, AORTIC VALVE;  Surgeon: Yuri Washington MD;  Location: [facility] OR [address] [place] [address];  Service: Cardiovascular;  Laterality: N/A;    CARDIAC SURGERY      CLOSURE N/A 12/1/2023    Procedure: CLOSURE, TEMPORARY;  Surgeon:  Yuri Washington MD;  Location: [facility] OR [facility] FLR;  Service: Cardiovascular;  Laterality: N/A;    DRAINAGE OF PLEURAL EFFUSION  12/4/2023    Procedure: DRAINAGE, PLEURAL EFFUSION;  Surgeon: Yuri Washington MD;  Location: [facility] OR [facility] FLR;  Service: Cardiovascular;;    INSERTION OF GRAFT TO PERICARDIUM  12/4/2023    Procedure: INSERTION, GRAFT, PERICARDIUM;  Surgeon: Yuri Washington MD;  Location: [facility] OR [facility] FLR;  Service: Cardiovascular;;    INSERTION OF INTRA-AORTIC BALLOON ASSIST DEVICE Right 11/21/2023    Procedure: INSERTION, INTRA-AORTIC BALLOON PUMP;  Surgeon: Finn Cohn MD;  Location: [facility] CATH LAB;  Service: Cardiology;  Laterality: Right;    LEFT VENTRICULAR ASSIST DEVICE Left 12/1/2023    Procedure: INSERTION-LEFT VENTRICULAR ASSIST DEVICE;  Surgeon: Yuri Washington MD;  [facility];  Service: Cardiovascular;  Laterality: Left;  REDO STERNOTOMY - REDO SAW NEEDED FOR CASE    LYSIS OF ADHESIONS  12/1/2023    Procedure: LYSIS, ADHESIONS;  Surgeon: Yuri Washington MD;  [facility];  Service: Cardiovascular;;    PLACEMENT OF SWAN ROLANDO CATHETER WITH IMAGING GUIDANCE  11/20/2023    Procedure: INSERTION, CATHETER, SWAN-ROLANDO, WITH IMAGING GUIDANCE;  Surgeon: Sajan Hurley MD;  Location: [facility] CATH LAB;  Service: Cardiology;;    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 12/1/2023    Procedure: REPAIR, ANEURYSM, ARTERY, FEMORAL;  Surgeon: Yuri Washington MD;  [facility];  Service: Cardiovascular;  Laterality: Right;  Right Femoral Artery Repair    RIGHT HEART CATHETERIZATION Right 10/10/2023    Procedure: INSERTION, CATHETER, RIGHT HEART;  Surgeon: Bin Gandhi MD;  Location: [facility] CATH LAB;  Service: Cardiology;  Laterality: Right;    RIGHT HEART CATHETERIZATION Right 10/13/2023    Procedure: INSERTION, CATHETER, RIGHT HEART;  Surgeon: Walter Mcintyre MD;  Location: [facility] CATH LAB;  Service: Cardiology;  Laterality: Right;    RIGHT HEART CATHETERIZATION   11/13/2023    RIGHT HEART CATHETERIZATION Right 11/13/2023    Procedure: INSERTION, CATHETER, RIGHT HEART;  Surgeon: Juventino Bermudez Jr., MD;  Location: [facility] CATH LAB;  Service: Cardiology;  Laterality: Right;    RIGHT HEART CATHETERIZATION Right 11/20/2023    Procedure: INSERTION, CATHETER, RIGHT HEART;  Surgeon: Sajan Hurley MD;  Location: [facility] CATH LAB;  Service: Cardiology;  Laterality: Right;    STERNAL WOUND CLOSURE N/A 12/4/2023    Procedure: CLOSURE, WOUND, STERNUM;  Surgeon: Yuri Washington MD;  Location: [facility] OR [facility] FLR;  Service: Cardiovascular;  Laterality: N/A;    STERNOTOMY N/A 12/1/2023    Procedure: STERNOTOMY, REDO;  Surgeon: Yuri Washington MD;  Location: [facility] OR 2ND FLR;  Service: Cardiovascular;  Laterality: N/A;    VALVULOPLASTY, MITRAL VALVE N/A 12/1/2023    Procedure: VALVULOPLASTY, MITRAL VALVE;  Surgeon: Yuri Washington MD;  Location: [facility] OR [facility] FLR;  Service: Cardiovascular;  Laterality: N/A;       Current Medications:   Scheduled Meds:    sodium chloride 0.9%   Intravenous Once    acetaminophen  1,000 mg Per NG tube TID    amiodarone  400 mg Per NG tube Daily    balsam peru-castor oiL   Topical (Top) BID    famotidine  20 mg Per G Tube Daily    gabapentin  125 mg Per NG tube Q12H    insulin aspart U-100  4 Units Subcutaneous 6 times per day    polyethylene glycol  17 g Per NG tube Daily    psyllium husk (aspartame)  1 packet Per NG tube Daily    senna-docusate 8.6-50 mg  2 tablet Per NG tube Daily    traZODone  50 mg Per NG tube QHS    venlafaxine  37.5 mg Per G Tube Daily    warfarin  2.5 mg Per G Tube Every Mon, Wed, Fri    warfarin  5 mg Per G Tube Every Tues, Thurs, Sat, Sun     PRN Meds: bisacodyL, dextrose 10 % in water (D10W), dextrose 10%, dextrose 10%, glucagon (human recombinant), heparin (porcine), hydrALAZINE, insulin aspart U-100, ondansetron, Flushing PICC/Midline Protocol **AND** [DISCONTINUED] sodium chloride 0.9% **AND** sodium chloride 0.9%, traMADoL,  zolpidem    Allergies:   Review of patient's allergies indicates:  No Known Allergies    Vitals  Vitals:    01/02/24 1600   BP:    Pulse: 91   Resp:    Temp:        Labs/Imaging/Studies:  Recent Results (from the past 24 hour(s))   POCT glucose    Collection Time: 01/01/24  5:14 PM   Result Value Ref Range    POCT Glucose 161 (H) 70 - 110 mg/dL   POCT glucose    Collection Time: 01/01/24  7:55 PM   Result Value Ref Range    POCT Glucose 132 (H) 70 - 110 mg/dL   POCT glucose    Collection Time: 01/02/24 12:19 AM   Result Value Ref Range    POCT Glucose 146 (H) 70 - 110 mg/dL   POCT glucose    Collection Time: 01/02/24  4:30 AM   Result Value Ref Range    POCT Glucose 151 (H) 70 - 110 mg/dL   Lactate dehydrogenase    Collection Time: 01/02/24  4:31 AM   Result Value Ref Range     (H) 110 - 260 U/L   Protime-INR    Collection Time: 01/02/24  4:31 AM   Result Value Ref Range    Prothrombin Time 22.4 (H) 9.0 - 12.5 sec    INR 2.2 (H) 0.8 - 1.2   Phosphorus    Collection Time: 01/02/24  4:31 AM   Result Value Ref Range    Phosphorus 2.7 2.7 - 4.5 mg/dL   Magnesium    Collection Time: 01/02/24  4:31 AM   Result Value Ref Range    Magnesium 2.1 1.6 - 2.6 mg/dL   Hepatic function panel    Collection Time: 01/02/24  4:31 AM   Result Value Ref Range    Total Protein 6.8 6.0 - 8.4 g/dL    Albumin 1.9 (L) 3.5 - 5.2 g/dL    Total Bilirubin 0.3 0.1 - 1.0 mg/dL    Bilirubin, Direct 0.2 0.1 - 0.3 mg/dL    AST 36 10 - 40 U/L    ALT 18 10 - 44 U/L    Alkaline Phosphatase 175 (H) 55 - 135 U/L   CBC auto differential    Collection Time: 01/02/24  4:31 AM   Result Value Ref Range    WBC 7.39 3.90 - 12.70 K/uL    RBC 2.41 (L) 4.60 - 6.20 M/uL    Hemoglobin 7.0 (L) 14.0 - 18.0 g/dL    Hematocrit 22.4 (L) 40.0 - 54.0 %    MCV 93 82 - 98 fL    MCH 29.0 27.0 - 31.0 pg    MCHC 31.3 (L) 32.0 - 36.0 g/dL    RDW 16.3 (H) 11.5 - 14.5 %    Platelets 450 150 - 450 K/uL    MPV 9.9 9.2 - 12.9 fL    Immature Granulocytes 0.4 0.0 - 0.5 %    Gran #  (ANC) 5.8 1.8 - 7.7 K/uL    Immature Grans (Abs) 0.03 0.00 - 0.04 K/uL    Lymph # 0.7 (L) 1.0 - 4.8 K/uL    Mono # 0.7 0.3 - 1.0 K/uL    Eos # 0.1 0.0 - 0.5 K/uL    Baso # 0.04 0.00 - 0.20 K/uL    nRBC 0 0 /100 WBC    Gran % 78.2 (H) 38.0 - 73.0 %    Lymph % 9.9 (L) 18.0 - 48.0 %    Mono % 9.2 4.0 - 15.0 %    Eosinophil % 1.8 0.0 - 8.0 %    Basophil % 0.5 0.0 - 1.9 %    Differential Method Automated    Basic metabolic panel    Collection Time: 01/02/24  4:31 AM   Result Value Ref Range    Sodium 134 (L) 136 - 145 mmol/L    Potassium 4.1 3.5 - 5.1 mmol/L    Chloride 97 95 - 110 mmol/L    CO2 26 23 - 29 mmol/L    Glucose 140 (H) 70 - 110 mg/dL    BUN 78 (H) 8 - 23 mg/dL    Creatinine 5.2 (H) 0.5 - 1.4 mg/dL    Calcium 8.6 (L) 8.7 - 10.5 mg/dL    Anion Gap 11 8 - 16 mmol/L    eGFR 11.8 (A) >60 mL/min/1.73 m^2   POCT glucose    Collection Time: 01/02/24  8:25 AM   Result Value Ref Range    POCT Glucose 155 (H) 70 - 110 mg/dL   POCT glucose    Collection Time: 01/02/24 11:53 AM   Result Value Ref Range    POCT Glucose 198 (H) 70 - 110 mg/dL   Echo    Collection Time: 01/02/24  2:40 PM   Result Value Ref Range    BSA 2.04 m2    LVIDd 6.25 (A) 3.5 - 6.0 cm    LV Systolic Volume 147.01 mL    LV Systolic Volume Index 72.1 mL/m2    LVIDs 5.49 (A) 2.1 - 4.0 cm    LV Diastolic Volume 197.42 mL    LV Diastolic Volume Index 96.77 mL/m2    IVS 0.71 0.6 - 1.1 cm    LVOT diameter 2.19 cm    LVOT area 3.8 cm2    FS 12 (A) 28 - 44 %    Left Ventricle Relative Wall Thickness 0.20 cm    Posterior Wall 0.63 0.6 - 1.1 cm    LV mass 161.26 g    LV Mass Index 79 g/m2    MV Peak E Zeferino 0.71 m/s    TDI LATERAL 0.07 m/s    TDI SEPTAL 0.03 m/s    E/E' ratio 14.20 m/s    TR Max Zeferino 2.47 m/s    LV SEPTAL E/E' RATIO 23.67 m/s    LA Volume Index 63.7 mL/m2    LV LATERAL E/E' RATIO 10.14 m/s    LA volume 129.96 cm3    RVDD 3.19 cm    TAPSE 1.39 cm    LA size 4.70 cm    Left Atrium Minor Axis 5.59 cm    Left Atrium Major Axis 6.48 cm    LA volume  (mod) 158.41 cm3    LA WIDTH 5.42 cm    LA Volume Index (Mod) 77.7 mL/m2    RA Major Axis 4.60 cm    RA Width 3.48 cm    Triscuspid Valve Regurgitation Peak Gradient 24 mmHg    Sinus 3.48 cm    STJ 2.52 cm    Mean e' 0.05 m/s    ZLVIDS 3.06     ZLVIDD 0.27      Imaging Results              X-Ray Chest AP Portable (Final result)  Result time 11/09/23 19:08:29      Final result by Norm Mccain MD (11/09/23 19:08:29)                   Impression:      As above      Electronically signed by: Norm Mccain MD  Date:    11/09/2023  Time:    19:08               Narrative:    EXAMINATION:  XR CHEST AP PORTABLE    CLINICAL HISTORY:  pulmonary edema;    TECHNIQUE:  Single frontal view of the chest was performed.    COMPARISON:  11/09/2023    FINDINGS:  Right PICC catheter tip projects over the mid to distal SVC.  Left chest wall pacer stable.  There is patchy increased interstitial and parenchymal attenuation bilaterally, similar to the previous examination allowing for shallow inspiratory effort on current exam.  There is no pneumothorax or significant interval detrimental change in the cardiopulmonary status since the previous exam.                                       X-Ray Chest AP Portable (Final result)  Result time 11/09/23 09:56:37      Final result by Jake Gupta MD (11/09/23 09:56:37)                   Impression:      The cardiopericardial silhouette remains enlarged.    Mild pulmonary venous hypertension, possibly related to CHF.    Additional findings as above.      Electronically signed by: Jake Gupta  Date:    11/09/2023  Time:    09:56               Narrative:    EXAMINATION:  XR CHEST AP PORTABLE    CLINICAL HISTORY:  CHF;    TECHNIQUE:  Single frontal view of the chest was performed.    COMPARISON:  Portable chest x-ray 10/30/2023    FINDINGS:  Midline thoracic trachea, allowing for rightward rotation of the patient's torso.    The cardiopericardial silhouette remains enlarged, similar to  the comparison study.    Left subclavian transvenous ICD, sternotomy wires, and visualized portions of a right upper extremity PICC line demonstrate no adverse interval changes from 10/30/2023.    Mildly engorged pulmonary vasculature, especially in the upper lobes.  Although patient positioning could contribute to this, some underlying pulmonary venous hypertension is not excluded.    Hazy increased opacities projecting over either lower lung are favored to be related to superimposed chest wall soft tissues.  Some new underlying pulmonary or pleural abnormalities, such as mild pulmonary edema, is not excluded.    No discrete pneumothorax.    No blunting of either lateral costophrenic angle.    Degenerative changes in the suboptimally visualized spine.                                      Hospital Course: No notes on file         Patient History               Medical as of 1/2/2024       Past Medical History       Diagnosis Date Comments Source    CAD (coronary artery disease) -- -- Patient    Diabetes mellitus -- -- Provider    HFrEF (heart failure with reduced ejection fraction) -- -- Patient    ICD (implantable cardioverter-defibrillator) in place -- -- Patient    MI, old -- -- Provider                          Surgical as of 1/2/2024       Past Surgical History       Procedure Laterality Date Comments Source    CARDIAC SURGERY -- -- -- Provider    RIGHT HEART CATHETERIZATION Right 10/10/2023 Procedure: INSERTION, CATHETER, RIGHT HEART;  Surgeon: Bin Gandhi MD;  Location: [facility] CATH LAB;  Service: Cardiology;  Laterality: Right; Provider    RIGHT HEART CATHETERIZATION Right 10/13/2023 Procedure: INSERTION, CATHETER, RIGHT HEART;  Surgeon: Walter Mcintyre MD;  Location: [facility] CATH LAB;  Service: Cardiology;  Laterality: Right; Provider    RIGHT HEART CATHETERIZATION -- 11/13/2023 -- Provider    RIGHT HEART CATHETERIZATION Right 11/13/2023 Procedure: INSERTION, CATHETER, RIGHT HEART;  Surgeon: Juventino Bermudez  NALLELY Rice MD;  Location: [facility] CATH LAB;  Service: Cardiology;  Laterality: Right; Provider    RIGHT HEART CATHETERIZATION Right 11/20/2023 Procedure: INSERTION, CATHETER, RIGHT HEART;  Surgeon: Sajan Hurley MD;  Location: [facility] CATH LAB;  Service: Cardiology;  Laterality: Right; Provider    PLACEMENT OF SWAN ROLANDO CATHETER WITH IMAGING GUIDANCE -- 11/20/2023 Procedure: INSERTION, CATHETER, SWAN-ROLANDO, WITH IMAGING GUIDANCE;  Surgeon: Sajan Hurley MD;  Location: [facility] CATH LAB;  Service: Cardiology;; Provider    INSERTION OF INTRA-AORTIC BALLOON ASSIST DEVICE Right 11/21/2023 Procedure: INSERTION, INTRA-AORTIC BALLOON PUMP;  Surgeon: Finn Cohn MD;  Location: [facility] CATH LAB;  Service: Cardiology;  Laterality: Right; Provider    LEFT VENTRICULAR ASSIST DEVICE Left 12/1/2023 Procedure: INSERTION-LEFT VENTRICULAR ASSIST DEVICE;  Surgeon: Yuri Washington MD;  Location: [facility] OR 2ND FLR;  Service: Cardiovascular;  Laterality: Left;  REDO STERNOTOMY - REDO SAW NEEDED FOR CASE Provider    STERNOTOMY N/A 12/1/2023 Procedure: STERNOTOMY, REDO;  Surgeon: Yuri Washington MD;  Location: [facility] OR 2ND FLR;  Service: Cardiovascular;  Laterality: N/A; Provider    VALVULOPLASTY, MITRAL VALVE N/A 12/1/2023 Procedure: VALVULOPLASTY, MITRAL VALVE;  Surgeon: Yuri Washington MD;  Location: [facility] OR 2ND FLR;  Service: Cardiovascular;  Laterality: N/A; Provider    LYSIS OF ADHESIONS -- 12/1/2023 Procedure: LYSIS, ADHESIONS;  Surgeon: Yuri Washington MD;  Location: [facility] OR 2ND FLR;  Service: Cardiovascular;; Provider    AORTIC VALVULOPLASTY N/A 12/1/2023 Procedure: REPAIR, AORTIC VALVE;  Surgeon: Yuri Washington MD;  Location: [facility] OR 2ND FLR;  Service: Cardiovascular;  Laterality: N/A; Provider    CLOSURE N/A 12/1/2023 Procedure: CLOSURE, TEMPORARY;  Surgeon: Yuri Washington MD;  Location: [facility] OR 2ND FLR;  Service: Cardiovascular;  Laterality: N/A; Provider    ANGIOPLASTY-VENOUS ARTERY Right 12/1/2023 Procedure:  ANGIOPLASTY-VENOUS ARTERY, RIGHT FEMORAL;  Surgeon: Yuri Washington MD;  [facility];  Service: Cardiovascular;  Laterality: Right; Provider    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 12/1/2023 Procedure: REPAIR, ANEURYSM, ARTERY, FEMORAL;  Surgeon: Yuri Washington MD;  [facility];  Service: Cardiovascular;  Laterality: Right;  Right Femoral Artery Repair Provider    STERNAL WOUND CLOSURE N/A 12/4/2023 Procedure: CLOSURE, WOUND, STERNUM;  Surgeon: Yuri Washington MD;  [facility];  Service: Cardiovascular;  Laterality: N/A; Provider    INSERTION OF GRAFT TO PERICARDIUM -- 12/4/2023 Procedure: INSERTION, GRAFT, PERICARDIUM;  Surgeon: Yuri Washington MD;  [facility];  Service: Cardiovascular;; Provider    DRAINAGE OF PLEURAL EFFUSION -- 12/4/2023 Procedure: DRAINAGE, PLEURAL EFFUSION;  Surgeon: Yuri Washington MD;  [facility];  Service: Cardiovascular;; Provider                          Family as of 1/2/2024    None               Tobacco Use as of 1/2/2024       Smoking Status Smoking Start Date Last Attempt to Quit Current Packs/Day Average Packs/Day    Every Day -- -- 0.5    Pack Year History   Packs/Day From To Years    0.5 -- -- 0.0      Smokeless Status Smokeless Type Smokeless Quit Date    Unknown -- --      Source    Provider                  Alcohol Use as of 1/2/2024       Alcohol Use Drinks/Week Alcohol/Week Comments Source    Yes   -- rarely Provider                  Drug Use as of 1/2/2024       Drug Use Types Frequency Comments Source    No -- -- -- Provider                  Sexual Activity as of 1/2/2024       Sexually Active Birth Control Partners Comments Source    -- -- -- -- Provider                  Activities of Daily Living as of 1/2/2024    None               Social Documentation as of 1/2/2024    None               Occupational as of 1/2/2024    None               Socioeconomic as of 1/2/2024       Marital Status Spouse  Name Number of Children Years Education Education Level Preferred Language Ethnicity Race Source     -- -- -- -- English Not  or /a Black or  --                  Pertinent History       Question Response Comments    Lives with -- --    Place in Birth Order -- --    Lives in -- --    Number of Siblings -- --    Raised by -- --    Legal Involvement -- --    Childhood Trauma -- --    Criminal History of -- --    Financial Status -- --    Highest Level of Education -- --    Does patient have access to a firearm? -- --     Service -- --    Primary Leisure Activity -- --    Spirituality -- --          Past Medical History:   Diagnosis Date    CAD (coronary artery disease)     Diabetes mellitus     HFrEF (heart failure with reduced ejection fraction)     ICD (implantable cardioverter-defibrillator) in place     MI, old      Past Surgical History:   Procedure Laterality Date    ANGIOPLASTY-VENOUS ARTERY Right 12/1/2023    Procedure: ANGIOPLASTY-VENOUS ARTERY, RIGHT FEMORAL;  Surgeon: Yuri Washington MD;  Location: [facility] OR [address] [place] [address];  Service: Cardiovascular;  Laterality: Right;    AORTIC VALVULOPLASTY N/A 12/1/2023    Procedure: REPAIR, AORTIC VALVE;  Surgeon: Yuri Washington MD;  [facility];  Service: Cardiovascular;  Laterality: N/A;    CARDIAC SURGERY      CLOSURE N/A 12/1/2023    Procedure: CLOSURE, TEMPORARY;  Surgeon: Yuri Washington MD;  [facility];  Service: Cardiovascular;  Laterality: N/A;    DRAINAGE OF PLEURAL EFFUSION  12/4/2023    Procedure: DRAINAGE, PLEURAL EFFUSION;  Surgeon: Yuri Washington MD;  [facility];  Service: Cardiovascular;;    INSERTION OF GRAFT TO PERICARDIUM  12/4/2023    Procedure: INSERTION, GRAFT, PERICARDIUM;  Surgeon: Yuri Washington MD;  [facility];  Service: Cardiovascular;;    INSERTION OF INTRA-AORTIC BALLOON ASSIST DEVICE Right 11/21/2023    Procedure: INSERTION, INTRA-AORTIC BALLOON  PUMP;  Surgeon: Finn Cohn MD;  Location: [facility] CATH LAB;  Service: Cardiology;  Laterality: Right;    LEFT VENTRICULAR ASSIST DEVICE Left 12/1/2023    Procedure: INSERTION-LEFT VENTRICULAR ASSIST DEVICE;  Surgeon: Yuri Washington MD;  [facility];  Service: Cardiovascular;  Laterality: Left;  REDO STERNOTOMY - REDO SAW NEEDED FOR CASE    LYSIS OF ADHESIONS  12/1/2023    Procedure: LYSIS, ADHESIONS;  Surgeon: Yuri Washington MD;  [address];  Service: Cardiovascular;;    PLACEMENT OF SWAN ROLANDO CATHETER WITH IMAGING GUIDANCE  11/20/2023    Procedure: INSERTION, CATHETER, SWAN-ROLANDO, WITH IMAGING GUIDANCE;  Surgeon: Sajan Hurley MD;  Location: [facility] CATH LAB;  Service: Cardiology;;    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 12/1/2023    Procedure: REPAIR, ANEURYSM, ARTERY, FEMORAL;  Surgeon: Yuri Washington MD;  [address];  Service: Cardiovascular;  Laterality: Right;  Right Femoral Artery Repair    RIGHT HEART CATHETERIZATION Right 10/10/2023    Procedure: INSERTION, CATHETER, RIGHT HEART;  Surgeon: Bin Gandhi MD;  Location: [facility] CATH LAB;  Service: Cardiology;  Laterality: Right;    RIGHT HEART CATHETERIZATION Right 10/13/2023    Procedure: INSERTION, CATHETER, RIGHT HEART;  Surgeon: Walter Mcintyre MD;  Location: [facility] CATH LAB;  Service: Cardiology;  Laterality: Right;    RIGHT HEART CATHETERIZATION  11/13/2023    RIGHT HEART CATHETERIZATION Right 11/13/2023    Procedure: INSERTION, CATHETER, RIGHT HEART;  Surgeon: Juventino Bermudez Jr., MD;  Location: [facility] CATH LAB;  Service: Cardiology;  Laterality: Right;    RIGHT HEART CATHETERIZATION Right 11/20/2023    Procedure: INSERTION, CATHETER, RIGHT HEART;  Surgeon: Sajan Hurley MD;  Location: [facility] CATH LAB;  Service: Cardiology;  Laterality: Right;    STERNAL WOUND CLOSURE N/A 12/4/2023    Procedure: CLOSURE, WOUND, STERNUM;  Surgeon: Yuri Washington MD;  Location: [address];   Service: Cardiovascular;  Laterality: N/A;    STERNOTOMY N/A 12/1/2023    Procedure: STERNOTOMY, REDO;  Surgeon: Yuri Washington MD;  Location: [facility] OR [address] [place] [address];  Service: Cardiovascular;  Laterality: N/A;    VALVULOPLASTY, MITRAL VALVE N/A 12/1/2023    Procedure: VALVULOPLASTY, MITRAL VALVE;  Surgeon: Yuri Washington MD;  Location: [facility] OR [address] [place] [address];  Service: Cardiovascular;  Laterality: N/A;     Family History    None       Tobacco Use    Smoking status: Every Day     Current packs/day: 0.50     Types: Cigarettes    Smokeless tobacco: Not on file   Substance and Sexual Activity    Alcohol use: Yes     Comment: rarely    Drug use: No    Sexual activity: Not on file     Review of patient's allergies indicates:  No Known Allergies    No current facility-administered medications on file prior to encounter.     Current Outpatient Medications on File Prior to Encounter   Medication Sig    amiodarone (PACERONE) 400 MG tablet Take 1 tablet (400 mg total) by mouth once daily.    apixaban (ELIQUIS) 5 mg Tab Take 1 tablet (5 mg total) by mouth 2 (two) times daily.    aspirin (ECOTRIN) 81 MG EC tablet Take 81 mg by mouth once daily.    atorvastatin (LIPITOR) 40 MG tablet Take 40 mg by mouth.    DOBUTamine (DOBUTREX) 1,000 mg/250 mL (4,000 mcg/mL) infusion Inject 389.5 mcg/min into the vein continuous.    fish oil-omega-3 fatty acids 300-1,000 mg capsule Take 2 g by mouth once daily.    furosemide (LASIX) 40 MG tablet Take 1 tablet (40 mg total) by mouth 2 (two) times daily.    gabapentin (NEURONTIN) 300 MG capsule Take 300 mg by mouth nightly as needed.    multivitamin capsule Take 1 capsule by mouth once daily.    pantoprazole (PROTONIX) 40 MG tablet Take 1 tablet (40 mg total) by mouth before breakfast.    potassium chloride SA (K-DUR,KLOR-CON) 20 MEQ tablet Take 1 tablet (20 mEq total) by mouth once daily.    sacubitriL-valsartan (ENTRESTO) 24-26 mg per tablet Take 1 tablet by mouth 2 (two) times daily.    metOLazone  (ZAROXOLYN) 2.5 MG tablet Take 1 tablet (2.5 mg total) by mouth once daily. Thursday 11/9 and Saturday 11/10    torsemide (DEMADEX) 20 MG Tab Take 2 tablets (40 mg total) by mouth 2 (two) times a day.     Psychotherapeutics (From admission, onward)      Start     Stop Route Frequency Ordered    12/31/23 2100  traZODone tablet 50 mg         -- PER NG TUBE Nightly 12/31/23 0844 12/26/23 2114  zolpidem tablet 5 mg         -- Oral Nightly PRN 12/26/23 2015 12/20/23 1045  venlafaxine tablet 37.5 mg         -- PER G TUBE Daily 12/20/23 0932          Review of Systems  Strengths and Liabilities: Strength: Patient accepts guidance/feedback, Strength: Patient is expressive/articulate., Liability: Patient lacks coping skills.    Objective:     Vital Signs (Most Recent):  Temp: 98.1 °F (36.7 °C) (01/02/24 1537)  Pulse: 94 (01/02/24 1800)  Resp: 18 (01/02/24 1550)  BP: 101/77 (01/02/24 1745)  SpO2: 100 % (01/02/24 1550) Vital Signs (24h Range):  Temp:  [98 °F (36.7 °C)-99.1 °F (37.3 °C)] 98.1 °F (36.7 °C)  Pulse:  [] 94  Resp:  [16-18] 18  SpO2:  [94 %-100 %] 100 %  BP: ()/(0-82) 101/77     Height: 6' (182.9 cm)  Weight: 82.1 kg (181 lb)  Body mass index is 24.55 kg/m².      Intake/Output Summary (Last 24 hours) at 1/2/2024 1806  Last data filed at 1/2/2024 0439  Gross per 24 hour   Intake 921 ml   Output 0 ml   Net 921 ml          Physical Exam        Significant Labs: All pertinent labs within the past 24 hours have been reviewed.    Significant Imaging: I have reviewed all pertinent imaging results/findings within the past 24 hours.  Assessment/Plan:     No notes have been filed under this hospital service.  Service: Psychiatry       Total Time:  60 minutes      Bri Frost NP   Psychiatry  [facility] - Cardiology Stepdown

## 2024-01-03 NOTE — PROGRESS NOTES
Update:  SW met with pt's wife.  Provided [place] paperwork that was faxed over by her employer. Wife advised that she will bring paperwork to the disability desk.  No other needs reported at this time.  SW will follow.

## 2024-01-03 NOTE — SUBJECTIVE & OBJECTIVE
Interval History: NAEON. No complaints. MBSS today.     Continuous Infusions:   sodium chloride 0.9%      dextrose 10 % in water (D10W)       Scheduled Meds:   acetaminophen  1,000 mg Per NG tube TID    amiodarone  400 mg Per NG tube Daily    balsam peru-castor oiL   Topical (Top) BID    famotidine  20 mg Per G Tube Daily    gabapentin  125 mg Per NG tube Q12H    insulin aspart U-100  4 Units Subcutaneous 6 times per day    polyethylene glycol  17 g Per NG tube Daily    psyllium husk (aspartame)  1 packet Per NG tube Daily    senna-docusate 8.6-50 mg  2 tablet Per NG tube Daily    traZODone  50 mg Per NG tube QHS    venlafaxine  37.5 mg Per G Tube Daily    warfarin  2.5 mg Per G Tube Every Mon, Wed, Fri    warfarin  5 mg Per G Tube Every Tues, Thurs, Sat, Sun     PRN Meds:bisacodyL, dextrose 10 % in water (D10W), dextrose 10%, dextrose 10%, glucagon (human recombinant), heparin (porcine), hydrALAZINE, insulin aspart U-100, ondansetron, Flushing PICC/Midline Protocol **AND** [DISCONTINUED] sodium chloride 0.9% **AND** sodium chloride 0.9%, traMADoL, zolpidem    Review of patient's allergies indicates:  No Known Allergies  Objective:     Vital Signs (Most Recent):  Temp: 98.1 °F (36.7 °C) (01/03/24 0736)  Pulse: 96 (01/03/24 0736)  Resp: 20 (01/03/24 0736)  BP: (!) 74/0 (01/03/24 0736)  SpO2: 96 % (Simultaneous filing. User may not have seen previous data.) (01/03/24 0736) Vital Signs (24h Range):  Temp:  [97.7 °F (36.5 °C)-98.9 °F (37.2 °C)] 98.1 °F (36.7 °C)  Pulse:  [] 96  Resp:  [16-20] 20  SpO2:  [95 %-100 %] 96 %  BP: ()/(0-77) 74/0     Patient Vitals for the past 72 hrs (Last 3 readings):   Weight   01/02/24 1440 82.1 kg (181 lb)     Body mass index is 24.55 kg/m².      Intake/Output Summary (Last 24 hours) at 1/3/2024 1008  Last data filed at 1/2/2024 2000  Gross per 24 hour   Intake 310 ml   Output 2411 ml   Net -2101 ml         Hemodynamic Parameters:  CVP:  [10 mmHg] 10 mmHg    Telemetry: SR        Physical Exam  Constitutional:       Comments: Cachectic, temporal wasting   HENT:      Head: Normocephalic and atraumatic.   Eyes:      Conjunctiva/sclera: Conjunctivae normal.      Pupils: Pupils are equal, round, and reactive to light.   Neck:      Comments: Do not appreciate elevated JVP  Cardiovascular:      Rate and Rhythm: Normal rate and regular rhythm.      Comments: Smooth VAD hum  Pulmonary:      Effort: Pulmonary effort is normal.      Breath sounds: Normal breath sounds.   Abdominal:      General: Bowel sounds are normal.      Palpations: Abdomen is soft.   Musculoskeletal:         General: No swelling. Normal range of motion.      Cervical back: Normal range of motion and neck supple.   Skin:     General: Skin is warm and dry.      Capillary Refill: Capillary refill takes 2 to 3 seconds.   Neurological:      General: No focal deficit present.      Mental Status: He is alert and oriented to person, place, and time.            Significant Labs:  CBC:  Recent Labs   Lab 01/01/24 0412 01/02/24  0431 01/03/24  0500   WBC 7.35 7.39 6.27   RBC 2.42* 2.41* 2.43*   HGB 6.9* 7.0* 6.9*   HCT 21.8* 22.4* 22.4*    450 441   MCV 90 93 92   MCH 28.5 29.0 28.4   MCHC 31.7* 31.3* 30.8*       BNP:  Recent Labs   Lab 12/29/23  0406 01/01/24  0412 01/03/24  0500   BNP 2,177* 794* 600*       CMP:  Recent Labs   Lab 01/01/24 0412 01/02/24  0431 01/03/24  0500   * 140* 143*   CALCIUM 8.3* 8.6* 8.5*   ALBUMIN 1.9* 1.9* 1.9*   PROT 6.6 6.8 6.9   * 134* 136   K 3.6 4.1 4.3   CO2 25 26 24   CL 97 97 100   BUN 52* 78* 50*   CREATININE 4.5* 5.2* 3.5*   ALKPHOS 159* 175* 176*   ALT 17 18 22   AST 36 36 43*   BILITOT 0.4 0.3 0.3        Coagulation:   Recent Labs   Lab 01/01/24 0412 01/02/24  0431 01/03/24  0500   INR 2.1* 2.2* 2.8*       LDH:  Recent Labs   Lab 01/01/24  0412 01/02/24  0431 01/03/24  0500   * 351* 372*       Microbiology:  Microbiology Results (last 7 days)       ** No results found  for the last 168 hours. **            I have reviewed all pertinent labs within the past 24 hours.    Estimated Creatinine Clearance: 24.3 mL/min (A) (based on SCr of 3.5 mg/dL (H)).    Diagnostic Results:  I have reviewed and interpreted all pertinent imaging results/findings within the past 24 hours.

## 2024-01-03 NOTE — PROGRESS NOTES
"CONSULTATION LIAISON PSYCHIATRY PROGRESS NOTE    Patient Name: Radha Abbott  MRN: [id number]  Patient Class: IP- Inpatient  Admission Date: 11/9/2023  Attending Physician: Brayan Ocampo MD      SUBJECTIVE:   Radha Abbott is a 63 y.o. male with no past psychiatric history & past pertinent medical history of  LVAD (left ventricular assist device), hx of CAD s/p 3v CABG (unclear anatomy, 2009), ICM with a recent EF of 15-20% s/p ICD (medtronik 2009), DM2 (a1c 7.7), HTN, HLD, Vfib on amio, chronic anemia, acute on chronic CKD  who initally presented to the ED with CC of SO and now he presents for psychiatric inpatient psychiatric consult. Psychiatry consulted was requested for assessment of MDD vs Adjustment d/o symptoms     Psychiatry consulted for passive SI   Today, patient seen sitting up in chair with wife at bedside. Patient reports his mood as "pretty good"but anxious to leave the hospital. Patient reports he has been sleeping well. Denies any questions or concerns. Denies SI. Patient reports the PRN Ambien has been really helping him sleep while in the hospital. Educated patient and family that is ambien is a good choice for short term use and while in the hospital but recommend tapering off once discharged.        OBJECTIVE:    Mental Status Exam:  General Appearance: appears stated age, normal weight, appropriately dressed  Behavior: normal; cooperative; reasonably friendly, pleasant, and polite; appropriate eye-contact; under good behavioral control  Involuntary Movements and Motor Activity: no abnormal involuntary movements noted; no tics, no tremors, no akathisia, no dystonia, no evidence of tardive dyskinesia; no psychomotor agitation or retardation  Gait and Station: unable to assess - patient lying down or seated  Speech and Language: intact; normal rate, rhythm, volume, tone, and pitch; conversational, spontaneous, and coherent; speaks and understands English proficiently and fluently; repeats words and " "phrases, no word finding difficulties are noted  Mood: "pretty good"  Affect: Appropriate  Thought Process and Associations: intact; linear, goal-directed, organized, and logical; no loosening of associations noted  Thought Content and Perceptions:: no suicidal or homicidal ideation, no auditory or visual hallucinations, no paranoid ideation, no ideas of reference, no evidence of delusions or psychosis  Sensorium and Orientation: oriented fully (to person, place, time and situation)  Recent and Remote Memory: He was not able to recall 3 items in 3 minutes   Attention and Concentration: intact, attentive to conversation  Fund of Knowledge: Fair he was only able to recall current PresidentZainab  Insight: Fair per patient understanding of his symptoms  Judgment: intact per patient behavior and per HPI    CAM ICU positive? no      ASSESSMENT & RECOMMENDATIONS     Adjustment Disorder w/ Depressed Mood  PSYCH MEDICATIONS   Continue scheduled Venlafaxine 37.5mg po daily and trazodone 50 mg po qhs for depressed mood   Ok to continue Ambien 5 mg PRN for  sleep while inpatient but recommend tapering off once discharged.     RISK ASSESSMENT  NO NEED FOR PEC patient NOT in any imminent danger of hurting    .           self or others and not gravely disabled.      FOLLOW UP  Will sign off at this time          Please contact ON CALL psychiatry service (24/7) for any acute issues that may arise.    Dr. Josey AGUILAR Psychiatry  [facility]  1/3/2024 10:10 AM        --------------------------------------------------------------------------------------------------------------------------------------------------------------------------------------------------------------------------------------    CONTINUED OBJECTIVE clinical data & findings reviewed and noted for above decision making    Current Medications:   Scheduled Meds:    acetaminophen  1,000 mg Per NG tube TID    amiodarone  400 mg Per NG tube " Daily    balsam peru-castor oiL   Topical (Top) BID    famotidine  20 mg Per G Tube Daily    gabapentin  125 mg Per NG tube Q12H    insulin aspart U-100  4 Units Subcutaneous 6 times per day    polyethylene glycol  17 g Per NG tube Daily    psyllium husk (aspartame)  1 packet Per NG tube Daily    senna-docusate 8.6-50 mg  2 tablet Per NG tube Daily    traZODone  50 mg Per NG tube QHS    venlafaxine  37.5 mg Per G Tube Daily    warfarin  2.5 mg Per G Tube Every Mon, Wed, Fri    warfarin  5 mg Per G Tube Every Tues, Thurs, Sat, Sun     PRN Meds: bisacodyL, dextrose 10 % in water (D10W), dextrose 10%, dextrose 10%, glucagon (human recombinant), heparin (porcine), hydrALAZINE, insulin aspart U-100, ondansetron, Flushing PICC/Midline Protocol **AND** [DISCONTINUED] sodium chloride 0.9% **AND** sodium chloride 0.9%, traMADoL, zolpidem    Allergies:   Review of patient's allergies indicates:  No Known Allergies    Vitals  Vitals:    01/03/24 0736   BP: (!) 74/0   Pulse: 96   Resp: 20   Temp: 98.1 °F (36.7 °C)       Labs/Imaging/Studies:  Recent Results (from the past 24 hour(s))   POCT glucose    Collection Time: 01/02/24 11:53 AM   Result Value Ref Range    POCT Glucose 198 (H) 70 - 110 mg/dL   Echo    Collection Time: 01/02/24  2:40 PM   Result Value Ref Range    BSA 2.04 m2    LVIDd 6.25 (A) 3.5 - 6.0 cm    LV Systolic Volume 147.01 mL    LV Systolic Volume Index 72.1 mL/m2    LVIDs 5.49 (A) 2.1 - 4.0 cm    LV Diastolic Volume 197.42 mL    LV Diastolic Volume Index 96.77 mL/m2    IVS 0.71 0.6 - 1.1 cm    LVOT diameter 2.19 cm    LVOT area 3.8 cm2    FS 12 (A) 28 - 44 %    Left Ventricle Relative Wall Thickness 0.20 cm    Posterior Wall 0.63 0.6 - 1.1 cm    LV mass 161.26 g    LV Mass Index 79 g/m2    MV Peak E Zeferino 0.71 m/s    TDI LATERAL 0.07 m/s    TDI SEPTAL 0.03 m/s    E/E' ratio 14.20 m/s    TR Max Zeferino 2.47 m/s    LV SEPTAL E/E' RATIO 23.67 m/s    LA Volume Index 63.7 mL/m2    LV LATERAL E/E' RATIO 10.14 m/s    LA  volume 129.96 cm3    RVDD 3.19 cm    TAPSE 1.39 cm    LA size 4.70 cm    Left Atrium Minor Axis 5.59 cm    Left Atrium Major Axis 6.48 cm    LA volume (mod) 158.41 cm3    LA WIDTH 5.42 cm    LA Volume Index (Mod) 77.7 mL/m2    RA Major Axis 4.60 cm    RA Width 3.48 cm    Triscuspid Valve Regurgitation Peak Gradient 24 mmHg    Sinus 3.48 cm    STJ 2.52 cm    Mean e' 0.05 m/s    ZLVIDS 3.06     ZLVIDD 0.27    POCT glucose    Collection Time: 01/02/24  4:23 PM   Result Value Ref Range    POCT Glucose 136 (H) 70 - 110 mg/dL   POCT glucose    Collection Time: 01/02/24  8:10 PM   Result Value Ref Range    POCT Glucose 139 (H) 70 - 110 mg/dL   POCT glucose    Collection Time: 01/02/24 11:33 PM   Result Value Ref Range    POCT Glucose 192 (H) 70 - 110 mg/dL   POCT glucose    Collection Time: 01/03/24  4:23 AM   Result Value Ref Range    POCT Glucose 164 (H) 70 - 110 mg/dL   Lactate dehydrogenase    Collection Time: 01/03/24  5:00 AM   Result Value Ref Range     (H) 110 - 260 U/L   Brain natriuretic peptide    Collection Time: 01/03/24  5:00 AM   Result Value Ref Range     (H) 0 - 99 pg/mL   C-reactive protein    Collection Time: 01/03/24  5:00 AM   Result Value Ref Range    .6 (H) 0.0 - 8.2 mg/L   Protime-INR    Collection Time: 01/03/24  5:00 AM   Result Value Ref Range    Prothrombin Time 28.1 (H) 9.0 - 12.5 sec    INR 2.8 (H) 0.8 - 1.2   Phosphorus    Collection Time: 01/03/24  5:00 AM   Result Value Ref Range    Phosphorus 1.8 (L) 2.7 - 4.5 mg/dL   Magnesium    Collection Time: 01/03/24  5:00 AM   Result Value Ref Range    Magnesium 2.0 1.6 - 2.6 mg/dL   Hepatic function panel    Collection Time: 01/03/24  5:00 AM   Result Value Ref Range    Total Protein 6.9 6.0 - 8.4 g/dL    Albumin 1.9 (L) 3.5 - 5.2 g/dL    Total Bilirubin 0.3 0.1 - 1.0 mg/dL    Bilirubin, Direct 0.1 0.1 - 0.3 mg/dL    AST 43 (H) 10 - 40 U/L    ALT 22 10 - 44 U/L    Alkaline Phosphatase 176 (H) 55 - 135 U/L   CBC auto differential     Collection Time: 01/03/24  5:00 AM   Result Value Ref Range    WBC 6.27 3.90 - 12.70 K/uL    RBC 2.43 (L) 4.60 - 6.20 M/uL    Hemoglobin 6.9 (L) 14.0 - 18.0 g/dL    Hematocrit 22.4 (L) 40.0 - 54.0 %    MCV 92 82 - 98 fL    MCH 28.4 27.0 - 31.0 pg    MCHC 30.8 (L) 32.0 - 36.0 g/dL    RDW 16.5 (H) 11.5 - 14.5 %    Platelets 441 150 - 450 K/uL    MPV 9.8 9.2 - 12.9 fL    Immature Granulocytes 0.5 0.0 - 0.5 %    Gran # (ANC) 4.8 1.8 - 7.7 K/uL    Immature Grans (Abs) 0.03 0.00 - 0.04 K/uL    Lymph # 0.7 (L) 1.0 - 4.8 K/uL    Mono # 0.7 0.3 - 1.0 K/uL    Eos # 0.1 0.0 - 0.5 K/uL    Baso # 0.04 0.00 - 0.20 K/uL    nRBC 0 0 /100 WBC    Gran % 75.9 (H) 38.0 - 73.0 %    Lymph % 11.5 (L) 18.0 - 48.0 %    Mono % 10.5 4.0 - 15.0 %    Eosinophil % 1.0 0.0 - 8.0 %    Basophil % 0.6 0.0 - 1.9 %    Differential Method Automated    Prealbumin    Collection Time: 01/03/24  5:00 AM   Result Value Ref Range    Prealbumin 30 20 - 43 mg/dL   Basic metabolic panel    Collection Time: 01/03/24  5:00 AM   Result Value Ref Range    Sodium 136 136 - 145 mmol/L    Potassium 4.3 3.5 - 5.1 mmol/L    Chloride 100 95 - 110 mmol/L    CO2 24 23 - 29 mmol/L    Glucose 143 (H) 70 - 110 mg/dL    BUN 50 (H) 8 - 23 mg/dL    Creatinine 3.5 (H) 0.5 - 1.4 mg/dL    Calcium 8.5 (L) 8.7 - 10.5 mg/dL    Anion Gap 12 8 - 16 mmol/L    eGFR 19.0 (A) >60 mL/min/1.73 m^2   Calcium, ionized    Collection Time: 01/03/24  5:00 AM   Result Value Ref Range    Ionized Calcium 1.17 1.06 - 1.42 mmol/L   POCT glucose    Collection Time: 01/03/24  7:41 AM   Result Value Ref Range    POCT Glucose 155 (H) 70 - 110 mg/dL     Imaging Results              X-Ray Chest AP Portable (Final result)  Result time 11/09/23 19:08:29      Final result by Norm Mccain MD (11/09/23 19:08:29)                   Impression:      As above      Electronically signed by: Norm Mccain MD  Date:    11/09/2023  Time:    19:08               Narrative:    EXAMINATION:  XR CHEST AP  PORTABLE    CLINICAL HISTORY:  pulmonary edema;    TECHNIQUE:  Single frontal view of the chest was performed.    COMPARISON:  11/09/2023    FINDINGS:  Right PICC catheter tip projects over the mid to distal SVC.  Left chest wall pacer stable.  There is patchy increased interstitial and parenchymal attenuation bilaterally, similar to the previous examination allowing for shallow inspiratory effort on current exam.  There is no pneumothorax or significant interval detrimental change in the cardiopulmonary status since the previous exam.                                       X-Ray Chest AP Portable (Final result)  Result time 11/09/23 09:56:37      Final result by Jake Gupta MD (11/09/23 09:56:37)                   Impression:      The cardiopericardial silhouette remains enlarged.    Mild pulmonary venous hypertension, possibly related to CHF.    Additional findings as above.      Electronically signed by: Jake Gupta  Date:    11/09/2023  Time:    09:56               Narrative:    EXAMINATION:  XR CHEST AP PORTABLE    CLINICAL HISTORY:  CHF;    TECHNIQUE:  Single frontal view of the chest was performed.    COMPARISON:  Portable chest x-ray 10/30/2023    FINDINGS:  Midline thoracic trachea, allowing for rightward rotation of the patient's torso.    The cardiopericardial silhouette remains enlarged, similar to the comparison study.    Left subclavian transvenous ICD, sternotomy wires, and visualized portions of a right upper extremity PICC line demonstrate no adverse interval changes from 10/30/2023.    Mildly engorged pulmonary vasculature, especially in the upper lobes.  Although patient positioning could contribute to this, some underlying pulmonary venous hypertension is not excluded.    Hazy increased opacities projecting over either lower lung are favored to be related to superimposed chest wall soft tissues.  Some new underlying pulmonary or pleural abnormalities, such as mild pulmonary edema, is not  excluded.    No discrete pneumothorax.    No blunting of either lateral costophrenic angle.    Degenerative changes in the suboptimally visualized spine.

## 2024-01-03 NOTE — PROGRESS NOTES
01/03/2024  Deni Frye    Current provider:  Brayan Ocampo MD    Device interrogation:      1/3/2024     7:35 AM 1/3/2024     4:16 AM 1/3/2024    12:00 AM 1/2/2024     8:00 PM 1/2/2024    12:40 PM 1/2/2024     8:11 AM 1/2/2024     4:20 AM   TXP LVAD INTERROGATIONS   Type HeartMate3 HeartMate3 HeartMate3 HeartMate3 HeartMate3 HeartMate3 HeartMate3   Flow 4.1 3.7 4 4.2 3.8 3.8 3.7   Speed 4900 4900 4900 4900 4900 4900 4900   PI 4.6 6.2 4.6 3.9 5 5.1 5.8   Power (Carrington) 3.3 3.3 3.3 3.3 3.2 3.2 3.4   LSL 4500   4500 4500 4500    Low Flow Alarm no   no  no    High Power Alarm no   no  no    Pulsatility Intermittent pulse  Intermittent pulse Intermittent pulse  Intermittent pulse           [facility] to ensure all mechanical assist device settings (IABP or VAD) were appropriate and all parameters were within limits.  I was able to ensure all back up equipment was present, the staff had no issues, and the Perfusion Department daily rounding was complete.      For implantable VADs: Interrogation of Ventricular assist device was performed with analysis of device parameters and review of device function. I have personally reviewed the interrogation findings and agree with findings as stated.     In emergency, the nursing units have been notified to contact the perfusion department either by:  Calling [phone] from 630am to 4pm Mon thru Fri, utilizing the On-Call Finder functionality of Epic and searching for Perfusion, or by contacting the hospital  from 4pm to 630am and on weekends and asking to speak with the perfusionist on call.    8:32 AM

## 2024-01-03 NOTE — PROGRESS NOTES
[facility] - Cardiology Stepdown  Heart Transplant  Progress Note    Patient Name: Radha Abbott  MRN: [id number]  Admission Date: 11/9/2023  Hospital Length of Stay: 55 days  Attending Physician: Brayan Ocampo MD  Primary Care Provider: Vasu Kong MD  Principal Problem:LVAD (left ventricular assist device) present    Subjective:   Interval History: NAEON. No complaints. MBSS today.     Continuous Infusions:   sodium chloride 0.9%      dextrose 10 % in water (D10W)       Scheduled Meds:   acetaminophen  1,000 mg Per NG tube TID    amiodarone  400 mg Per NG tube Daily    balsam peru-castor oiL   Topical (Top) BID    famotidine  20 mg Per G Tube Daily    gabapentin  125 mg Per NG tube Q12H    insulin aspart U-100  4 Units Subcutaneous 6 times per day    polyethylene glycol  17 g Per NG tube Daily    psyllium husk (aspartame)  1 packet Per NG tube Daily    senna-docusate 8.6-50 mg  2 tablet Per NG tube Daily    traZODone  50 mg Per NG tube QHS    venlafaxine  37.5 mg Per G Tube Daily    warfarin  2.5 mg Per G Tube Every Mon, Wed, Fri    warfarin  5 mg Per G Tube Every Tues, Thurs, Sat, Sun     PRN Meds:bisacodyL, dextrose 10 % in water (D10W), dextrose 10%, dextrose 10%, glucagon (human recombinant), heparin (porcine), hydrALAZINE, insulin aspart U-100, ondansetron, Flushing PICC/Midline Protocol **AND** [DISCONTINUED] sodium chloride 0.9% **AND** sodium chloride 0.9%, traMADoL, zolpidem    Review of patient's allergies indicates:  No Known Allergies  Objective:     Vital Signs (Most Recent):  Temp: 98.1 °F (36.7 °C) (01/03/24 0736)  Pulse: 96 (01/03/24 0736)  Resp: 20 (01/03/24 0736)  BP: (!) 74/0 (01/03/24 0736)  SpO2: 96 % (Simultaneous filing. User may not have seen previous data.) (01/03/24 0736) Vital Signs (24h Range):  Temp:  [97.7 °F (36.5 °C)-98.9 °F (37.2 °C)] 98.1 °F (36.7 °C)  Pulse:  [] 96  Resp:  [16-20] 20  SpO2:  [95 %-100 %] 96 %  BP: ()/(0-77) 74/0     Patient Vitals for the past  72 hrs (Last 3 readings):   Weight   01/02/24 1440 82.1 kg (181 lb)     Body mass index is 24.55 kg/m².      Intake/Output Summary (Last 24 hours) at 1/3/2024 1008  Last data filed at 1/2/2024 2000  Gross per 24 hour   Intake 310 ml   Output 2411 ml   Net -2101 ml         Hemodynamic Parameters:  CVP:  [10 mmHg] 10 mmHg    Telemetry: SR       Physical Exam  Constitutional:       Comments: Cachectic, temporal wasting   HENT:      Head: Normocephalic and atraumatic.   Eyes:      Conjunctiva/sclera: Conjunctivae normal.      Pupils: Pupils are equal, round, and reactive to light.   Neck:      Comments: Do not appreciate elevated JVP  Cardiovascular:      Rate and Rhythm: Normal rate and regular rhythm.      Comments: Smooth VAD hum  Pulmonary:      Effort: Pulmonary effort is normal.      Breath sounds: Normal breath sounds.   Abdominal:      General: Bowel sounds are normal.      Palpations: Abdomen is soft.   Musculoskeletal:         General: No swelling. Normal range of motion.      Cervical back: Normal range of motion and neck supple.   Skin:     General: Skin is warm and dry.      Capillary Refill: Capillary refill takes 2 to 3 seconds.   Neurological:      General: No focal deficit present.      Mental Status: He is alert and oriented to person, place, and time.            Significant Labs:  CBC:  Recent Labs   Lab 01/01/24  0412 01/02/24  0431 01/03/24  0500   WBC 7.35 7.39 6.27   RBC 2.42* 2.41* 2.43*   HGB 6.9* 7.0* 6.9*   HCT 21.8* 22.4* 22.4*    450 441   MCV 90 93 92   MCH 28.5 29.0 28.4   MCHC 31.7* 31.3* 30.8*       BNP:  Recent Labs   Lab 12/29/23  0406 01/01/24  0412 01/03/24  0500   BNP 2,177* 794* 600*       CMP:  Recent Labs   Lab 01/01/24  0412 01/02/24  0431 01/03/24  0500   * 140* 143*   CALCIUM 8.3* 8.6* 8.5*   ALBUMIN 1.9* 1.9* 1.9*   PROT 6.6 6.8 6.9   * 134* 136   K 3.6 4.1 4.3   CO2 25 26 24   CL 97 97 100   BUN 52* 78* 50*   CREATININE 4.5* 5.2* 3.5*   ALKPHOS 159* 175*  176*   ALT 17 18 22   AST 36 36 43*   BILITOT 0.4 0.3 0.3        Coagulation:   Recent Labs   Lab 01/01/24  0412 01/02/24  0431 01/03/24  0500   INR 2.1* 2.2* 2.8*       LDH:  Recent Labs   Lab 01/01/24  0412 01/02/24  0431 01/03/24  0500   * 351* 372*       Microbiology:  Microbiology Results (last 7 days)       ** No results found for the last 168 hours. **            I have reviewed all pertinent labs within the past 24 hours.    Estimated Creatinine Clearance: 24.3 mL/min (A) (based on SCr of 3.5 mg/dL (H)).    Diagnostic Results:  I have reviewed and interpreted all pertinent imaging results/findings within the past 24 hours.  Assessment and Plan:     63 year old male with hx of CAD s/p 3v CABG (unclear anatomy, 2009), ICM with a recent EF of 15-20% s/p ICD (medtronik 2009), DM2 (a1c 7.7), HTN, HLD, Vfib on amio who presents to the ED with CC of SOB     Pt was recently admitted to [facility] as a transfer.  He was started on a Lasix gtt and did well.  Started on gDMT and discharged home on  5 with plans to follow up in HTS clinic for ongoing transplant evaluation at another facility.  He says that about a few days ago he started to notice LE swelling.  This turned into Nelson and orthopnea.  He says he can't walk to the bathroom without getting SOB.  He also complains of weight gain.  He takes torsemide 20mg BID at home and was told to trial additional Lasix which he did without any improvement.  He was rx metolazone but has not been filled.  He came to the ED     In the ED he was AF with stable VS on RA.  CBC showing chronic anemia.  CMP notable for acute on chronic CKD with baseline around 2.1 and Cr now 2.6.  BNP elevated.  Lactate neg.  CXR showing pulmonary edema.  I evaluated the pt at the bedside.  Bedside TTE showing CVP >15.  He was subsequently admitted to the CCU for diuresis.     He was continued on his home  and was started on a lasix gtt, which he responded well to overnight (net -1700cc. He  feels much better this morning as well. Our transplant coordinators have been working on insurance approval and he is now being transferred to [facility] service for transplant evaluation.     * LVAD (left ventricular assist device) present  -HeartMate 3 Implanted  12/1/2023  as DT  -HTS Primary  -Implanted by Dr. Washington  -Continue Coumadin, dosing by PharmD.  Goal INR 2.0-3.0 . Therapeutic today.   -Antiplatelets Not on ASA  -LDH is stable overall today. Will continue to monitor daily.  -Weaned off midodrine.  -Speed set at  4900   rpm, LSL 4500 rpm   -Interrogation notable for no events  -ECHO 1/2/24 mild TR, LVEDD 6.25 with HM3 set to 4900 rpm   -Not listed for OHTx, declined for OHTX due to comorbidities   -PT/OT/Speech. Recommending rehab but HD+LVAD may be barrier.       Procedure: Device Interrogation Including analysis of device parameters  Current Settings: Ventricular Assist Device  Review of device function is stable/unstable stable        1/3/2024     7:35 AM 1/3/2024     4:16 AM 1/3/2024    12:00 AM 1/2/2024     8:00 PM 1/2/2024    12:40 PM 1/2/2024     8:11 AM 1/2/2024     4:20 AM   TXP LVAD INTERROGATIONS   Type HeartMate3 HeartMate3 HeartMate3 HeartMate3 HeartMate3 HeartMate3 HeartMate3   Flow 4.1 3.7 4 4.2 3.8 3.8 3.7   Speed 4900 4900 4900 4900 4900 4900 4900   PI 4.6 6.2 4.6 3.9 5 5.1 5.8   Power ([place]rington) 3.3 3.3 3.3 3.3 3.2 3.2 3.4   LSL 4500   4500 4500 4500    Low Flow Alarm no   no  no    High Power Alarm no   no  no    Pulsatility Intermittent pulse  Intermittent pulse Intermittent pulse  Intermittent pulse          Unilateral complete vocal fold paralysis  -Appreciate ENT's help  -S/P augmentation of paralyzed left vocal cord 12/18    Dysphonia  -Speech following closely  -Appreciate ENT's help. S/P augmentation of paralyzed left vocal cord 12/18    Moderate malnutrition  -RD and SLP following  -Tolerating tube feeds. Total daily caloric intake increased to 2160  -Failed MBBS 12/20, continue  TF.  -Speech following. Plan to repeat swallow study today     Depressed mood  -Psychiatry consulted for depressed mood, SI when left alone  -Recommend sitter when alone (possibly with transition to stepdown).  -Psych reconsulted 1/2, agree with venlafaxine and trazadone    IVÁN (acute kidney injury)  -Nephrology following      Acute on chronic combined systolic and diastolic CHF, NYHA class 4  Pt with known ICM with an EF of 25% on home  presented with decompensated HF.    -S/P LVAD    PAF (paroxysmal atrial fibrillation)  Known hx of pAF. In sinus rhythm on admission, but 1 run of AF RVR overnight. He spontaneously converted.  - Continue home amiodarone 400mg qd  - Continue AC      Acute renal failure with acute tubular necrosis superimposed on stage 3b chronic kidney disease  IVÁN on CKD2. Baseline Cr of 1.7-2.0.   - Hold ARNi  -Trend BMPs daily   -Nephrology following   -SLED/SCUF for volume removal began on 12/1. He is anuric  -Did not respond to diuretic challenge with 240 mg IV Lasix, 1000 mg Diuril, and 500 mg IV Diamox done 12/14  -Now on HD  -Tunneled trialysis line placed 12/22 by IR    Type 2 diabetes mellitus without complication, without long-term current use of insulin  -A1c 7.6, not insulin dependent  - Endocrine following, appreciate assistance with blood glucose management    CAD (coronary artery disease)  -S/p 3vCABG in 2009  -Lipitor on hold 2/2 mild transaminitis  -Not on ASA post VAD    Ventricular tachycardia  Hx VT s/p ICD placement (medtronic 2009)  - Continue home amio 400mg qd        Vero Lozada PA-C  Heart Transplant  [facility] - Cardiology Stepdown

## 2024-01-03 NOTE — CARE UPDATE
-Glucose Goal 140-180    -A1C:   Hemoglobin A1C   Date Value Ref Range Status   10/12/2023 7.6 (H) 4.0 - 5.6 % Final     Comment:     ADA Screening Guidelines:  5.7-6.4%  Consistent with prediabetes  >or=6.5%  Consistent with diabetes    High levels of fetal hemoglobin interfere with the HbA1C  assay. Heterozygous hemoglobin variants (HbS, HgC, etc)do  not significantly interfere with this assay.   However, presence of multiple variants may affect accuracy.           -HOME REGIMEN: metformin     -GLUCOSE TREND FOR THE PAST 24HRS:   Recent Labs   Lab 01/02/24  1153 01/02/24  1623 01/02/24 2010 01/02/24  2333 01/03/24  0423 01/03/24  0741   POCTGLUCOSE 198* 136* 139* 192* 164* 155*           -NO HYPOGYCEMIAS NOTED     - Diet  Diet NPO Except for: Medication (per NG tube)    -Steroids - none     -Tube Feeds - TF @ 45ml/hr         Plan:   - Novolog 4 units q 4 hr to cover TF- hold if TF held or BG < 100   - LDC (150/50) prn q4hr   - POCT Glucose every 4 hours  - Hypoglycemia protocol in place      ** Please notify Endocrine for any change and/or advance in diet**  ** Please call Endocrine for any BG related issues **     Discharge Planning:   TBD. Please notify endocrinology prior to discharge.

## 2024-01-03 NOTE — PROCEDURES
Modified Barium Swallow    Patient Name:  Radha Abbott   MRN:  [id number]      Recommendations:     Recommendations:                General Recommendations:  Dysphagia therapy  Diet recommendations:  Minced & Moist Diet - IDDSI Level 5, Moderately thick liquids - IDDSI Level 3   Aspiration Precautions: Strict aspiration precautions   General Precautions: Standard, fall  Communication strategies:  none    Referral     Reason for Referral  Patient was referred for a Modified Barium Swallow Study to assess the efficiency of his/her swallow function, rule out aspiration and make recommendations regarding safe dietary consistencies, effective compensatory strategies, and safe eating environment.     Diagnosis: LVAD (left ventricular assist device) present       History:     Past Medical History:   Diagnosis Date    CAD (coronary artery disease)     Diabetes mellitus     HFrEF (heart failure with reduced ejection fraction)     ICD (implantable cardioverter-defibrillator) in place     MI, old        Objective:     Current Respiratory Status: 01/03/24    Alert: yes    Cooperative: yes    Follows Directions: yes    Visualization  Patient was seen in the lateral view    Oral Peripheral Examination  Oral Musculature: WFL, general weakness  Dentition: present and adequate  Secretion Management: oral holding  Volitional Cough: weak  Volitional Swallow: delayed initiation  Voice Prior to PO Intake: weak; hoearse breathy dysphonic    Consistencies Assessed  Thin 15 ml  Nectar thick 30 ml  Thin-Honey Thick 25 ml  Puree 10 ml  Solids 1/4 cracker with barium pudding    Oral Preparation/Oral Phase  prolonged A-P transfer  prolonged mastication    Pharyngeal Phase     ·Pt  swallow delay with trigger at the level of the vellecula     ·Pt with reduced BOT retraction    ·Pt with reduced hyolaryngeal elevation and excursion patterns    ·Pt with incomplete epiglottic inversion patterns, epiglottis seen abutting the pharyngeal wall    ·Pt with  penetration of nectar thick liquids during subsequent second swallow, during the swallow     ·Pt with aspiration trace residue after the second swallow with thin liquids.     ·Pt without sensory response/airway protective response    ·Pt without spontaneous/warranted cued throat clear or cough to eject material from laryngeal vestibule    Penetrated material observed to rest upon vocal folds with high aspiration risk/likelihood    ·Pt with adequate airway protection without penetration or aspiration with honey thick liquids, purees and solids.     ·There was presence of valleculae and pyriform sinus residue/stasis with thin and nectar thick liquids.     Cervical Esophageal Phase  UES appeared to accommodate all bolus types without stasis or retrograde movement observed     Assessment:     Impressions    Patient demonstrates moderate  Oropharyngeal  dysphagia characterized by delayed swallow initiation, reduced epiglottic inversion, reduced BOT and reduced hylaryngeal excursion patterns resulting in observed tracheal silent aspiration of thin liquids and penetration of nectar thick liquids.  Pt requiring cued cough to clear the aspirated/penetration material. Pt with noted vallecular and pyriform sinus residue with thin and nectar thick liquids, pt requiring cued second swallow to clear residue. There was no observed aspiration or penetration with honey thick liquids, purees and solids.     Prognosis: Good    Barriers:  Fatigue    Plan  Initiation of minced/moist and honey thick liquid diet with aspiration precautions. Medications crushed.   SLP will administer nectar thick liquids and soft solids at the bedside for the purpose of advancing the pts diet further   Pt will continue swallow exercises.     Education  Results were discussed with patient.   Results were discussed with Medical Team who was in agreement with plan.   SLP calling pts wife to update her on the results above.      Goals:   Multidisciplinary  Problems       SLP Goals          Problem: SLP    Goal Priority Disciplines Outcome   SLP Goal     SLP Ongoing, Progressing   Description: Speech Language Pathology Goals  Goals expected to be met by 12/24    1. Pt will participate in ongoing assessment of swallow function to help determine least restrictive diet                            Plan:   Patient to be seen:  Therapy Frequency: 4 x/week   Plan of Care reviewed with:  patient, spouse        Discharge recommendations:  High Intensity Therapy   Barriers to Discharge:  None    Time Tracking:   SLP Treatment Date:   01/03/24  Speech Start Time:  1345  Speech Stop Time:  1420     Speech Total Time (min):  35 min    01/03/2024

## 2024-01-03 NOTE — PLAN OF CARE
Problem: SLP  Goal: SLP Goal  Description: Speech Language Pathology Goals  Goals expected to be met by 12/24    1. Pt will participate in ongoing assessment of swallow function to help determine least restrictive diet       Outcome: Ongoing, Progressing   Repeat MBSS completed. Recommend pt upgrade to minced/moist and honey thick liquids. No straws. Medications crushed. Full report to come. SLP will follow up.

## 2024-01-03 NOTE — PLAN OF CARE
Problem: Adult Inpatient Plan of Care  Goal: Plan of Care Review  Outcome: Ongoing, Progressing  Flowsheets (Taken 1/3/2024 0628)  Plan of Care Reviewed With:   patient   caregiver  Goal: Patient-Specific Goal (Individualized)  Outcome: Ongoing, Progressing     Problem: Diabetes Comorbidity  Goal: Blood Glucose Level Within Targeted Range  Outcome: Ongoing, Progressing  Intervention: Monitor and Manage Glycemia  Flowsheets (Taken 1/3/2024 0628)  Glycemic Management:   blood glucose monitored   supplemental insulin given     Problem: Fall Injury Risk  Goal: Absence of Fall and Fall-Related Injury  Outcome: Ongoing, Progressing     Problem: Skin Injury Risk Increased  Goal: Skin Health and Integrity  Outcome: Ongoing, Progressing  Intervention: Optimize Skin Protection  Flowsheets (Taken 1/3/2024 0628)  Pressure Reduction Techniques:   frequent weight shift encouraged   positioned off wounds   heels elevated off bed   pressure points protected     Problem: Adjustment to Device (Ventricular Assist Device)  Goal: Optimal Adjustment to Device  Outcome: Ongoing, Progressing   Plan of care discussed with patient. Patient is free of fall/trauma/injury. Pt. Is aa0x4. Doppler WNL. Fall precautions in place. Pt. Is Weight shifting frequently to prevent skin breakdown.

## 2024-01-03 NOTE — NURSING
Plan of care discussed with patient. Fall precautions in place. LVAD DP and numbers WNL, smooth LVAD hum. Patient has no complaints of pain. Patient currently being dialyzed, dialysis nurse at bedside. Wife at bedside. Discussed medications and care. Patient has no questions at this time.

## 2024-01-03 NOTE — PROGRESS NOTES
[facility]  Psychology  Consult Note      PROGRESS NOTE - INTERVENTION  Patient Name: Radha Abbott  MRN: [id number]  Date: 01/03/2024  Admission Date: 11/9/2023   Length of Stay: Hospital Day: 56   Attending Physician: Brayan Ocampo MD    HISTORY OF PRESENTING ILLNESS: 64 yo male with LVAD for DT on 12/1, post op complicated by diffuse coagulopathy and RV dysfunction with subsequent chest closure on 12/4. Hospital course complicated by respiratory failure s/p intubation now extubated.    BRIEF ASSESSMENT  Mood: Regular periods of low mood that sometimes last all day. Also noted difficulties managing irritability. Denied anhedonia and suicidal ideation.  Anxiety: Moderate anxiety related to worries about health and recovery. Endorsed excessive anxiety, uncontrollable worry, anxiety-related muscle tension.   Pain: Rated current pain as 3/10. Denied concerns regarding pain management.  Sleep: Mild impairment involving nighttime awakenings. Stated that Trazodone helps.  Appetite: Denied concerns. Currently has NGT.    INTERVENTION  Intervention Type: Motivational Interviewing  Session Content: Engaged patient in motivational interviewing intervention focused participation in PT and rehab in general. Elicited description of health values and recovery goals. Elicited description of progress made in recovery thus far. Patient described strong desire to regain his strength so that he may return home.    MENTAL STATUS EXAM & OBSERVATIONS  Appearance:  Good grooming and hygiene. Dressed in pajamas. Appeared stated age.      Orientation: Oriented x 4.   Speech: Soft volume. Communicated mostly via yes/no head nods.   Thought Processes: Logical and goal-oriented.      Thought Content: Normal. No suicidal or homicidal ideation. No indications of obsessions or delusions.   Mood: Anxious.   Affect: Full range, congruent with mood and session content..   Attention & Concentration: Intact. No deficits noted.    Insight: Good   Judgment: Good.   Behavioral Observations: Cooperative and engaged. Sitting in chair. Good eye contact. No signs of psychomotor agitation or retardation.     DIAGNOSTIC IMPRESSION & PLAN  Patient Active Problem List   Diagnosis    Ventricular tachycardia    CAD (coronary artery disease)    HFrEF (heart failure with reduced ejection fraction)    Type 2 diabetes mellitus without complication, without long-term current use of insulin    Hypokalemia    Acute renal failure with acute tubular necrosis superimposed on stage 3b chronic kidney disease    PAF (paroxysmal atrial fibrillation)    Ventricular fibrillation    Acute on chronic combined systolic and diastolic CHF, NYHA class 4    Defibrillator discharge    Receiving inotropic medication    Pre-transplant evaluation for heart transplant    Palliative care encounter    Advance care planning    LVAD (left ventricular assist device) present    Abnormal CXR    Acute encephalopathy    IVÁN (acute kidney injury)    Congestive heart failure    Septic shock    Depressed mood    Moderate malnutrition    Dysphonia    Unilateral complete vocal fold paralysis    Oliguria    Shortness of breath    Acute renal failure with tubular necrosis    Acute renal failure on dialysis    Acute kidney injury superimposed on chronic kidney disease    Adjustment disorder with depressed mood    Deep tissue injury    Blood blister       Impression: 64 yo male with LVAD for DT on 12/1, post op complicated by diffuse coagulopathy and RV dysfunction with subsequent chest closure on 12/4. Hospital course complicated by respiratory failure s/p intubation now extubated. Psychiatry following. Patient endorsed moderate low mood and anxiety, which are directly related to current medical stress. Patient meets criteria for Adjustment Disorder with Anxiety. Social support is good. Overall, coping skills are somewhat limited.    Plan: Psychology will continue to follow.      Recommendations  For RN: Please ask patient for preference regarding scheduling time to sit up in a chair. Increasing sense of control may work to improve motivation.       Length of Service: 17 minutes    Blue Hdz, Ph.D.  Dept. of Psychiatry  [facility]

## 2024-01-03 NOTE — ASSESSMENT & PLAN NOTE
-HeartMate 3 Implanted  12/1/2023  as DT  -HTS Primary  -Implanted by Dr. Washington  -Continue Coumadin, dosing by PharmD.  Goal INR 2.0-3.0 . Therapeutic today.   -Antiplatelets Not on ASA  -LDH is stable overall today. Will continue to monitor daily.  -Weaned off midodrine.  -Speed set at  4900   rpm, LSL 4500 rpm   -Interrogation notable for no events  -ECHO 1/2/24 mild TR, LVEDD 6.25 with HM3 set to 4900 rpm   -Not listed for OHTx, declined for OHTX due to comorbidities   -PT/OT/Speech. Recommending rehab but HD+LVAD may be barrier.       Procedure: Device Interrogation Including analysis of device parameters  Current Settings: Ventricular Assist Device  Review of device function is stable/unstable stable        1/3/2024     7:35 AM 1/3/2024     4:16 AM 1/3/2024    12:00 AM 1/2/2024     8:00 PM 1/2/2024    12:40 PM 1/2/2024     8:11 AM 1/2/2024     4:20 AM   TXP LVAD INTERROGATIONS   Type HeartMate3 HeartMate3 HeartMate3 HeartMate3 HeartMate3 HeartMate3 HeartMate3   Flow 4.1 3.7 4 4.2 3.8 3.8 3.7   Speed 4900 4900 4900 4900 4900 4900 4900   PI 4.6 6.2 4.6 3.9 5 5.1 5.8   Power ([place]) 3.3 3.3 3.3 3.3 3.2 3.2 3.4   LSL 4500   4500 4500 4500    Low Flow Alarm no   no  no    High Power Alarm no   no  no    Pulsatility Intermittent pulse  Intermittent pulse Intermittent pulse  Intermittent pulse

## 2024-01-03 NOTE — PLAN OF CARE
Problem: Physical Therapy  Goal: Physical Therapy Goal  Description: Goals to be met by: 23     Patient will increase functional independence with mobility by performin. Sit to stand transfer with modified independence  2. Bed to chair transfer with supervision  3. Gait  x 150 feet with supervision using physician approved AD with standing rest breaks prn.   4. Lower extremity exercise program x30 reps per handout, with independence  5. Recite 3/3 sternal precautions and remain complaint with precautions throughout session with no verbal cues      Outcome: Ongoing, Progressing   Goals remain appropriate. 1/3/2024

## 2024-01-03 NOTE — ASSESSMENT & PLAN NOTE
-RD and SLP following  -Tolerating tube feeds. Total daily caloric intake increased to 2160  -Failed MBBS 12/20, continue TF.  -Speech following. Plan to repeat swallow study today

## 2024-01-04 LAB
ADENOVIRUS: NOT DETECTED
ALBUMIN SERPL BCP-MCNC: 1.9 G/DL (ref 3.5–5.2)
ALP SERPL-CCNC: 181 U/L (ref 55–135)
ALT SERPL W/O P-5'-P-CCNC: 29 U/L (ref 10–44)
ANION GAP SERPL CALC-SCNC: 11 MMOL/L (ref 8–16)
ANISOCYTOSIS BLD QL SMEAR: SLIGHT
AST SERPL-CCNC: 52 U/L (ref 10–40)
BASOPHILS # BLD AUTO: 0.03 K/UL (ref 0–0.2)
BASOPHILS NFR BLD: 0.4 % (ref 0–1.9)
BILIRUB DIRECT SERPL-MCNC: 0.1 MG/DL (ref 0.1–0.3)
BILIRUB SERPL-MCNC: 0.3 MG/DL (ref 0.1–1)
BORDETELLA PARAPERTUSSIS (IS1001): NOT DETECTED
BORDETELLA PERTUSSIS (PTXP): NOT DETECTED
BUN SERPL-MCNC: 74 MG/DL (ref 8–23)
CALCIUM SERPL-MCNC: 8.5 MG/DL (ref 8.7–10.5)
CHLAMYDIA PNEUMONIAE: NOT DETECTED
CHLORIDE SERPL-SCNC: 100 MMOL/L (ref 95–110)
CO2 SERPL-SCNC: 23 MMOL/L (ref 23–29)
CORONAVIRUS 229E, COMMON COLD VIRUS: NOT DETECTED
CORONAVIRUS HKU1, COMMON COLD VIRUS: NOT DETECTED
CORONAVIRUS NL63, COMMON COLD VIRUS: NOT DETECTED
CORONAVIRUS OC43, COMMON COLD VIRUS: NOT DETECTED
CREAT SERPL-MCNC: 4.4 MG/DL (ref 0.5–1.4)
DIFFERENTIAL METHOD BLD: ABNORMAL
EOSINOPHIL # BLD AUTO: 0.2 K/UL (ref 0–0.5)
EOSINOPHIL NFR BLD: 2.3 % (ref 0–8)
ERYTHROCYTE [DISTWIDTH] IN BLOOD BY AUTOMATED COUNT: 16.6 % (ref 11.5–14.5)
EST. GFR  (NO RACE VARIABLE): 14.5 ML/MIN/1.73 M^2
FLUBV RNA NPH QL NAA+NON-PROBE: NOT DETECTED
GLUCOSE SERPL-MCNC: 162 MG/DL (ref 70–110)
HCT VFR BLD AUTO: 19.8 % (ref 40–54)
HGB BLD-MCNC: 6.5 G/DL (ref 14–18)
HPIV1 RNA NPH QL NAA+NON-PROBE: NOT DETECTED
HPIV2 RNA NPH QL NAA+NON-PROBE: NOT DETECTED
HPIV3 RNA NPH QL NAA+NON-PROBE: NOT DETECTED
HPIV4 RNA NPH QL NAA+NON-PROBE: NOT DETECTED
HUMAN METAPNEUMOVIRUS: NOT DETECTED
HYPOCHROMIA BLD QL SMEAR: ABNORMAL
IMM GRANULOCYTES # BLD AUTO: 0.03 K/UL (ref 0–0.04)
IMM GRANULOCYTES NFR BLD AUTO: 0.4 % (ref 0–0.5)
INFLUENZA A (SUBTYPES H1,H1-2009,H3): NOT DETECTED
INR PPP: 2.1 (ref 0.8–1.2)
LDH SERPL L TO P-CCNC: 389 U/L (ref 110–260)
LYMPHOCYTES # BLD AUTO: 0.9 K/UL (ref 1–4.8)
LYMPHOCYTES NFR BLD: 13 % (ref 18–48)
MAGNESIUM SERPL-MCNC: 2.1 MG/DL (ref 1.6–2.6)
MCH RBC QN AUTO: 28.4 PG (ref 27–31)
MCHC RBC AUTO-ENTMCNC: 32.8 G/DL (ref 32–36)
MCV RBC AUTO: 87 FL (ref 82–98)
MONOCYTES # BLD AUTO: 0.6 K/UL (ref 0.3–1)
MONOCYTES NFR BLD: 8.3 % (ref 4–15)
MYCOPLASMA PNEUMONIAE: NOT DETECTED
NEUTROPHILS # BLD AUTO: 5.3 K/UL (ref 1.8–7.7)
NEUTROPHILS NFR BLD: 75.6 % (ref 38–73)
NRBC BLD-RTO: 0 /100 WBC
OVALOCYTES BLD QL SMEAR: ABNORMAL
PHOSPHATE SERPL-MCNC: 1.9 MG/DL (ref 2.7–4.5)
PLATELET # BLD AUTO: 397 K/UL (ref 150–450)
PMV BLD AUTO: 9.5 FL (ref 9.2–12.9)
POCT GLUCOSE: 149 MG/DL (ref 70–110)
POCT GLUCOSE: 149 MG/DL (ref 70–110)
POCT GLUCOSE: 189 MG/DL (ref 70–110)
POCT GLUCOSE: 204 MG/DL (ref 70–110)
POCT GLUCOSE: 219 MG/DL (ref 70–110)
POCT GLUCOSE: 37 MG/DL (ref 70–110)
POIKILOCYTOSIS BLD QL SMEAR: SLIGHT
POLYCHROMASIA BLD QL SMEAR: ABNORMAL
POTASSIUM SERPL-SCNC: 4.5 MMOL/L (ref 3.5–5.1)
PROT SERPL-MCNC: 6.9 G/DL (ref 6–8.4)
PROTHROMBIN TIME: 21.4 SEC (ref 9–12.5)
RBC # BLD AUTO: 2.29 M/UL (ref 4.6–6.2)
RESPIRATORY INFECTION PANEL SOURCE: NORMAL
RSV RNA NPH QL NAA+NON-PROBE: NOT DETECTED
RV+EV RNA NPH QL NAA+NON-PROBE: NOT DETECTED
SARS-COV-2 RNA RESP QL NAA+PROBE: NOT DETECTED
SODIUM SERPL-SCNC: 134 MMOL/L (ref 136–145)
SPHEROCYTES BLD QL SMEAR: ABNORMAL
WBC # BLD AUTO: 6.99 K/UL (ref 3.9–12.7)

## 2024-01-04 PROCEDURE — 97116 GAIT TRAINING THERAPY: CPT

## 2024-01-04 PROCEDURE — 84100 ASSAY OF PHOSPHORUS: CPT | Performed by: INTERNAL MEDICINE

## 2024-01-04 PROCEDURE — 63600175 PHARM REV CODE 636 W HCPCS: Performed by: NURSE PRACTITIONER

## 2024-01-04 PROCEDURE — 63600175 PHARM REV CODE 636 W HCPCS

## 2024-01-04 PROCEDURE — 83615 LACTATE (LD) (LDH) ENZYME: CPT | Performed by: STUDENT IN AN ORGANIZED HEALTH CARE EDUCATION/TRAINING PROGRAM

## 2024-01-04 PROCEDURE — 92526 ORAL FUNCTION THERAPY: CPT

## 2024-01-04 PROCEDURE — 97535 SELF CARE MNGMENT TRAINING: CPT

## 2024-01-04 PROCEDURE — 99233 SBSQ HOSP IP/OBS HIGH 50: CPT | Mod: ,,, | Performed by: PHYSICIAN ASSISTANT

## 2024-01-04 PROCEDURE — 87798 DETECT AGENT NOS DNA AMP: CPT | Performed by: STUDENT IN AN ORGANIZED HEALTH CARE EDUCATION/TRAINING PROGRAM

## 2024-01-04 PROCEDURE — 25000003 PHARM REV CODE 250: Performed by: NURSE PRACTITIONER

## 2024-01-04 PROCEDURE — 87150 DNA/RNA AMPLIFIED PROBE: CPT | Mod: 59 | Performed by: STUDENT IN AN ORGANIZED HEALTH CARE EDUCATION/TRAINING PROGRAM

## 2024-01-04 PROCEDURE — 51798 US URINE CAPACITY MEASURE: CPT

## 2024-01-04 PROCEDURE — 25000003 PHARM REV CODE 250

## 2024-01-04 PROCEDURE — 27000248 HC VAD-ADDITIONAL DAY

## 2024-01-04 PROCEDURE — 99232 SBSQ HOSP IP/OBS MODERATE 35: CPT | Mod: ,,,

## 2024-01-04 PROCEDURE — 25000003 PHARM REV CODE 250: Performed by: STUDENT IN AN ORGANIZED HEALTH CARE EDUCATION/TRAINING PROGRAM

## 2024-01-04 PROCEDURE — 85025 COMPLETE CBC W/AUTO DIFF WBC: CPT | Performed by: INTERNAL MEDICINE

## 2024-01-04 PROCEDURE — 27001001 HC LVAD KIT (15 DAY SUPPLY)

## 2024-01-04 PROCEDURE — 80100014 HC HEMODIALYSIS 1:1

## 2024-01-04 PROCEDURE — 93750 INTERROGATION VAD IN PERSON: CPT | Mod: 76,,, | Performed by: INTERNAL MEDICINE

## 2024-01-04 PROCEDURE — 87077 CULTURE AEROBIC IDENTIFY: CPT | Performed by: STUDENT IN AN ORGANIZED HEALTH CARE EDUCATION/TRAINING PROGRAM

## 2024-01-04 PROCEDURE — 25000003 PHARM REV CODE 250: Performed by: INTERNAL MEDICINE

## 2024-01-04 PROCEDURE — 80076 HEPATIC FUNCTION PANEL: CPT | Performed by: INTERNAL MEDICINE

## 2024-01-04 PROCEDURE — 20600001 HC STEP DOWN PRIVATE ROOM

## 2024-01-04 PROCEDURE — 99233 SBSQ HOSP IP/OBS HIGH 50: CPT | Mod: ,,, | Performed by: NURSE PRACTITIONER

## 2024-01-04 PROCEDURE — 87186 SC STD MICRODIL/AGAR DIL: CPT | Performed by: STUDENT IN AN ORGANIZED HEALTH CARE EDUCATION/TRAINING PROGRAM

## 2024-01-04 PROCEDURE — 97530 THERAPEUTIC ACTIVITIES: CPT

## 2024-01-04 PROCEDURE — 83735 ASSAY OF MAGNESIUM: CPT | Performed by: INTERNAL MEDICINE

## 2024-01-04 PROCEDURE — 85610 PROTHROMBIN TIME: CPT | Performed by: INTERNAL MEDICINE

## 2024-01-04 PROCEDURE — 87070 CULTURE OTHR SPECIMN AEROBIC: CPT | Performed by: STUDENT IN AN ORGANIZED HEALTH CARE EDUCATION/TRAINING PROGRAM

## 2024-01-04 PROCEDURE — 80048 BASIC METABOLIC PNL TOTAL CA: CPT | Performed by: NURSE PRACTITIONER

## 2024-01-04 RX ORDER — INSULIN ASPART 100 [IU]/ML
4 INJECTION, SOLUTION INTRAVENOUS; SUBCUTANEOUS
Status: DISCONTINUED | OUTPATIENT
Start: 2024-01-05 | End: 2024-01-13

## 2024-01-04 RX ORDER — INSULIN ASPART 100 [IU]/ML
6 INJECTION, SOLUTION INTRAVENOUS; SUBCUTANEOUS
Status: DISCONTINUED | OUTPATIENT
Start: 2024-01-04 | End: 2024-01-13

## 2024-01-04 RX ORDER — INSULIN ASPART 100 [IU]/ML
6 INJECTION, SOLUTION INTRAVENOUS; SUBCUTANEOUS
Status: DISCONTINUED | OUTPATIENT
Start: 2024-01-05 | End: 2024-01-13

## 2024-01-04 RX ORDER — SODIUM CHLORIDE 9 MG/ML
INJECTION, SOLUTION INTRAVENOUS ONCE
Status: COMPLETED | OUTPATIENT
Start: 2024-01-04 | End: 2024-01-04

## 2024-01-04 RX ORDER — INSULIN ASPART 100 [IU]/ML
4 INJECTION, SOLUTION INTRAVENOUS; SUBCUTANEOUS
Status: DISCONTINUED | OUTPATIENT
Start: 2024-01-04 | End: 2024-01-13

## 2024-01-04 RX ADMIN — SENNOSIDES AND DOCUSATE SODIUM 2 TABLET: 8.6; 5 TABLET ORAL at 09:01

## 2024-01-04 RX ADMIN — ACETAMINOPHEN 1000 MG: 500 TABLET ORAL at 09:01

## 2024-01-04 RX ADMIN — AMIODARONE HYDROCHLORIDE 400 MG: 200 TABLET ORAL at 09:01

## 2024-01-04 RX ADMIN — INSULIN ASPART 2 UNITS: 100 INJECTION, SOLUTION INTRAVENOUS; SUBCUTANEOUS at 12:01

## 2024-01-04 RX ADMIN — TRAMADOL HYDROCHLORIDE 50 MG: 50 TABLET, COATED ORAL at 10:01

## 2024-01-04 RX ADMIN — GABAPENTIN 125 MG: 250 SOLUTION ORAL at 10:01

## 2024-01-04 RX ADMIN — INSULIN ASPART 4 UNITS: 100 INJECTION, SOLUTION INTRAVENOUS; SUBCUTANEOUS at 10:01

## 2024-01-04 RX ADMIN — INSULIN ASPART 2 UNITS: 100 INJECTION, SOLUTION INTRAVENOUS; SUBCUTANEOUS at 09:01

## 2024-01-04 RX ADMIN — ACETAMINOPHEN 1000 MG: 500 TABLET ORAL at 10:01

## 2024-01-04 RX ADMIN — INSULIN ASPART 4 UNITS: 100 INJECTION, SOLUTION INTRAVENOUS; SUBCUTANEOUS at 05:01

## 2024-01-04 RX ADMIN — HEPARIN SODIUM 2200 UNITS: 1000 INJECTION, SOLUTION INTRAVENOUS; SUBCUTANEOUS at 01:01

## 2024-01-04 RX ADMIN — INSULIN ASPART 6 UNITS: 100 INJECTION, SOLUTION INTRAVENOUS; SUBCUTANEOUS at 05:01

## 2024-01-04 RX ADMIN — SODIUM CHLORIDE: 9 INJECTION, SOLUTION INTRAVENOUS at 11:01

## 2024-01-04 RX ADMIN — VENLAFAXINE 37.5 MG: 37.5 TABLET ORAL at 09:01

## 2024-01-04 RX ADMIN — GABAPENTIN 125 MG: 250 SOLUTION ORAL at 09:01

## 2024-01-04 RX ADMIN — TRAZODONE HYDROCHLORIDE 50 MG: 50 TABLET ORAL at 10:01

## 2024-01-04 RX ADMIN — ACETAMINOPHEN 1000 MG: 500 TABLET ORAL at 05:01

## 2024-01-04 RX ADMIN — INSULIN ASPART 4 UNITS: 100 INJECTION, SOLUTION INTRAVENOUS; SUBCUTANEOUS at 09:01

## 2024-01-04 RX ADMIN — WARFARIN SODIUM 5 MG: 5 TABLET ORAL at 05:01

## 2024-01-04 RX ADMIN — INSULIN ASPART 6 UNITS: 100 INJECTION, SOLUTION INTRAVENOUS; SUBCUTANEOUS at 12:01

## 2024-01-04 RX ADMIN — FAMOTIDINE 20 MG: 20 TABLET, FILM COATED ORAL at 09:01

## 2024-01-04 RX ADMIN — Medication: at 09:01

## 2024-01-04 NOTE — PROGRESS NOTES
01/04/2024  Monet Aguiar    Current provider:  Brayan Ocampo MD    Device interrogation:      1/4/2024    12:00 PM 1/4/2024     7:46 AM 1/4/2024     4:33 AM 1/4/2024    12:00 AM 1/3/2024     7:30 PM 1/3/2024     3:30 PM 1/3/2024    11:56 AM   TXP LVAD INTERROGATIONS   Type HeartMate3 HeartMate3 HeartMate3 HeartMate3 HeartMate3 HeartMate3 HeartMate3   Flow 3.6 3.8 3.8 3.8 4 3.9 4.1   Speed 4900 4950 4950 4900 4900 4900 4900   PI 4.6 4.8 4.6 4.8 4 4.8 4.6   Power ([place]) 3.2 3.2 3.3 3.2 3.3 3.3 3.4   LSL 4500 4500   4500 4500 4500   Low Flow Alarm no no    no NO   High Power Alarm no no    no NO   Pulsatility Intermittent pulse Intermittent pulse Intermittent pulse Intermittent pulse Intermittent pulse Intermittent pulse Intermittent pulse          Rounded on [facility] to ensure all mechanical assist device settings (IABP or VAD) were appropriate and all parameters were within limits.  I was able to ensure all back up equipment was present, the staff had no issues, and the Perfusion Department daily rounding was complete.      For implantable VADs: Interrogation of Ventricular assist device was performed with analysis of device parameters and review of device function. I have personally reviewed the interrogation findings and agree with findings as stated.     In emergency, the nursing units have been notified to contact the perfusion department either by:  Calling [phone] from 630am to 4pm Mon thru Fri, utilizing the On-Call Finder functionality of Epic and searching for Perfusion, or by contacting the hospital  from 4pm to 630am and on weekends and asking to speak with the perfusionist on call.    4:03 PM

## 2024-01-04 NOTE — SUBJECTIVE & OBJECTIVE
Interval History:   No complaints this AM. 24 hr  mL. sCr up to 4.4 from 3.5. Other electrolytes stable. Plans for HD today.     Review of patient's allergies indicates:  No Known Allergies  Current Facility-Administered Medications   Medication Frequency    0.9%  NaCl infusion Continuous    0.9%  NaCl infusion Once    acetaminophen tablet 1,000 mg TID    amiodarone tablet 400 mg Daily    balsam peru-castor oiL Oint BID    bisacodyL suppository 10 mg Daily PRN    dextrose 10 % infusion Continuous PRN    dextrose 10% bolus 125 mL 125 mL PRN    dextrose 10% bolus 250 mL 250 mL PRN    epoetin zohaib injection 8,100 Units Every Tues, Thurs, Sat    famotidine tablet 20 mg Daily    gabapentin 250 mg/5 mL (5 mL) solution 125 mg Q12H    glucagon (human recombinant) injection 1 mg PRN    heparin (porcine) injection 1,000 Units PRN    hydrALAZINE injection 10 mg Q6H PRN    insulin aspart U-100 pen 0-5 Units Q4H PRN    insulin aspart U-100 pen 4 Units 6 times per day    ondansetron injection 4 mg Q6H PRN    polyethylene glycol packet 17 g Daily    psyllium husk (aspartame) 3.4 gram packet 1 packet Daily    senna-docusate 8.6-50 mg per tablet 2 tablet Daily    sodium chloride 0.9% flush 10 mL PRN    traMADoL tablet 50 mg Q8H PRN    traZODone tablet 50 mg QHS    venlafaxine tablet 37.5 mg Daily    warfarin (COUMADIN) tablet 2.5 mg Every Mon, Wed, Fri    warfarin (COUMADIN) tablet 5 mg Every Tues, Thurs, Sat, Sun    zolpidem tablet 5 mg Nightly PRN       Objective:     Vital Signs (Most Recent):  Temp: 97.5 °F (36.4 °C) (01/04/24 1140)  Pulse: 92 (01/04/24 1145)  Resp: 16 (01/04/24 1145)  BP: 90/66 (01/04/24 1145)  SpO2: (!) 90 % (01/04/24 0745) Vital Signs (24h Range):  Temp:  [97.5 °F (36.4 °C)-98.3 °F (36.8 °C)] 97.5 °F (36.4 °C)  Pulse:  [] 92  Resp:  [16-20] 16  SpO2:  [90 %-99 %] 90 %  BP: ()/(0-71) 90/66     Weight: 73.5 kg (162 lb) (01/04/24 0746)  Body mass index is 21.97 kg/m².  Body surface area is  1.93 meters squared.    I/O last 3 completed shifts:  In: 790 [P.O.:150; NG/GT:640]  Out: 175 [Urine:175]     Physical Exam  Vitals and nursing note reviewed.   HENT:      Head: Normocephalic and atraumatic.      Mouth/Throat:      Mouth: Mucous membranes are dry.   Eyes:      Conjunctiva/sclera: Conjunctivae normal.   Cardiovascular:      Comments: LVAD   Pulmonary:      Effort: Pulmonary effort is normal.      Comments: RA  Abdominal:      Palpations: Abdomen is soft.   Musculoskeletal:         General: No swelling.      Right lower leg: No edema.      Left lower leg: No edema.   Skin:     General: Skin is warm.   Neurological:      Mental Status: He is alert and oriented to person, place, and time.   Psychiatric:         Mood and Affect: Mood normal.         Behavior: Behavior normal.          Significant Labs:  CBC:   Recent Labs   Lab 01/04/24  0453   WBC 6.99   RBC 2.29*   HGB 6.5*   HCT 19.8*      MCV 87   MCH 28.4   MCHC 32.8     CMP:   Recent Labs   Lab 01/04/24  0453   *   CALCIUM 8.5*   ALBUMIN 1.9*   PROT 6.9   *   K 4.5   CO2 23      BUN 74*   CREATININE 4.4*   ALKPHOS 181*   ALT 29   AST 52*   BILITOT 0.3     All labs within the past 24 hours have been reviewed.

## 2024-01-04 NOTE — SUBJECTIVE & OBJECTIVE
Interval History: NAEON. No complaints. Silent aspiration of MBSS. Continue minced/moist and honey thick liquid diet with aspiration precautions.     Continuous Infusions:   sodium chloride 0.9%      dextrose 10 % in water (D10W)       Scheduled Meds:   sodium chloride 0.9%   Intravenous Once    acetaminophen  1,000 mg Per NG tube TID    amiodarone  400 mg Per NG tube Daily    balsam peru-castor oiL   Topical (Top) BID    epoetin zohaib (PROCRIT) injection  50 Units/kg Subcutaneous Every Tues, Thurs, Sat    famotidine  20 mg Per G Tube Daily    gabapentin  125 mg Per NG tube Q12H    insulin aspart U-100  4 Units Subcutaneous 6 times per day    polyethylene glycol  17 g Per NG tube Daily    psyllium husk (aspartame)  1 packet Per NG tube Daily    senna-docusate 8.6-50 mg  2 tablet Per NG tube Daily    traZODone  50 mg Per NG tube QHS    venlafaxine  37.5 mg Per G Tube Daily    warfarin  2.5 mg Per G Tube Every Mon, Wed, Fri    warfarin  5 mg Per G Tube Every Tues, Thurs, Sat, Sun     PRN Meds:bisacodyL, dextrose 10 % in water (D10W), dextrose 10%, dextrose 10%, glucagon (human recombinant), heparin (porcine), hydrALAZINE, insulin aspart U-100, ondansetron, Flushing PICC/Midline Protocol **AND** [DISCONTINUED] sodium chloride 0.9% **AND** sodium chloride 0.9%, traMADoL, zolpidem    Review of patient's allergies indicates:  No Known Allergies  Objective:     Vital Signs (Most Recent):  Temp: 98.3 °F (36.8 °C) (01/04/24 0745)  Pulse: 100 (01/04/24 1000)  Resp: 16 (01/04/24 0745)  BP: 104/71 (01/04/24 0746)  SpO2: (!) 90 % (01/04/24 0745) Vital Signs (24h Range):  Temp:  [97.5 °F (36.4 °C)-98.3 °F (36.8 °C)] 98.3 °F (36.8 °C)  Pulse:  [] 100  Resp:  [16-20] 16  SpO2:  [90 %-99 %] 90 %  BP: ()/(0-71) 104/71     Patient Vitals for the past 72 hrs (Last 3 readings):   Weight   01/04/24 0746 73.5 kg (162 lb)   01/02/24 1440 82.1 kg (181 lb)       Body mass index is 21.97 kg/m².      Intake/Output Summary (Last 24  hours) at 1/4/2024 1022  Last data filed at 1/4/2024 0800  Gross per 24 hour   Intake 750 ml   Output 175 ml   Net 575 ml         Hemodynamic Parameters:  CVP:  [7 mmHg] 7 mmHg    Telemetry: SR       Physical Exam  Constitutional:       Comments: Cachectic, temporal wasting   HENT:      Head: Normocephalic and atraumatic.   Eyes:      Conjunctiva/sclera: Conjunctivae normal.      Pupils: Pupils are equal, round, and reactive to light.   Neck:      Comments: Do not appreciate elevated JVP  Cardiovascular:      Rate and Rhythm: Normal rate and regular rhythm.      Comments: Smooth VAD hum  Pulmonary:      Effort: Pulmonary effort is normal.      Breath sounds: Normal breath sounds.   Abdominal:      General: Bowel sounds are normal.      Palpations: Abdomen is soft.   Musculoskeletal:         General: No swelling. Normal range of motion.      Cervical back: Normal range of motion and neck supple.   Skin:     General: Skin is warm and dry.      Capillary Refill: Capillary refill takes 2 to 3 seconds.   Neurological:      General: No focal deficit present.      Mental Status: He is alert and oriented to person, place, and time.            Significant Labs:  CBC:  Recent Labs   Lab 01/02/24  0431 01/03/24  0500 01/04/24  0453   WBC 7.39 6.27 6.99   RBC 2.41* 2.43* 2.29*   HGB 7.0* 6.9* 6.5*   HCT 22.4* 22.4* 19.8*    441 397   MCV 93 92 87   MCH 29.0 28.4 28.4   MCHC 31.3* 30.8* 32.8       BNP:  Recent Labs   Lab 12/29/23  0406 01/01/24  0412 01/03/24  0500   BNP 2,177* 794* 600*       CMP:  Recent Labs   Lab 01/02/24  0431 01/03/24  0500 01/04/24  0453   * 143* 162*   CALCIUM 8.6* 8.5* 8.5*   ALBUMIN 1.9* 1.9* 1.9*   PROT 6.8 6.9 6.9   * 136 134*   K 4.1 4.3 4.5   CO2 26 24 23   CL 97 100 100   BUN 78* 50* 74*   CREATININE 5.2* 3.5* 4.4*   ALKPHOS 175* 176* 181*   ALT 18 22 29   AST 36 43* 52*   BILITOT 0.3 0.3 0.3        Coagulation:   Recent Labs   Lab 01/02/24  0431 01/03/24  0500 01/04/24  1518    INR 2.2* 2.8* 2.1*       LDH:  Recent Labs   Lab 01/02/24  0431 01/03/24  0500 01/04/24  0453   * 372* 389*       Microbiology:  Microbiology Results (last 7 days)       ** No results found for the last 168 hours. **            I have reviewed all pertinent labs within the past 24 hours.    Estimated Creatinine Clearance: 18.3 mL/min (A) (based on SCr of 4.4 mg/dL (H)).    Diagnostic Results:  I have reviewed and interpreted all pertinent imaging results/findings within the past 24 hours.

## 2024-01-04 NOTE — PT/OT/SLP PROGRESS
Speech Language Pathology Treatment    Patient Name:  Radha Abbott   MRN:  [id number]  Admitting Diagnosis: LVAD (left ventricular assist device) present    Recommendations:                 General Recommendations:  Dysphagia therapy  Diet recommendations:  Minced & Moist Diet - IDDSI Level 5, Liquid Diet Level: Moderately thick/Honey thick liquids - IDDSI Level 3  All liquids MUST be a honey thick consistency - Pt is a silent aspirator.   Pre-thickened liquids level 3 with yellow triangle - MODERATELY THICK  Can also thicken using thickener packets, 1 full pack to 4 oz of fluid   Can continue with ice chips only when completing dysphagia exercises  Encourage double swallows between bites and sips to clear residue     Aspiration Precautions: Strict aspiration precautions   General Precautions: Standard, fall  Communication strategies:  none    Assessment:     Radha Abbott is a 63 y.o. male with an SLP diagnosis of Dysphagia.  Subjective     Pt awake and alert upon arrival     Pain/Comfort:  Pain Rating 1: 0/10  Pain Rating Post-Intervention 1: 0/10    Respiratory Status: Room air    Objective:     Has the patient been evaluated by SLP for swallowing?   Yes  Keep patient NPO? No   Current Respiratory Status:   Room air      Pt seen for ongoing dysphagia tx. Pt remains with NG tube in place. Pt vocal quality intermittently strong and clear this date. Pt continues to repeatedly clear through and orally suction secretions. SLP reviewed with pt results from yesterdays repeat MBS and diet recommendations. SLP reviewed at length all precautions including importance of executing second swallows throughout meals to assist with clearing residue SLP discussed with pt at length how to thicken liquids to safest consistency as well as which pre-thickened liquids meet honey thick recommendations. Pt was observed this date to consume ~ 3oz of honey thickened liquids and soft chopped solids x4. Pt warranting SLP mod-max verbal cues to  execute a second dry swallow prior to taking additional bites/sips.Pt grimacing with taste of thickened liquids an dSLP again emphasized importance of utilizing thickened liquids as a means to having NG tube removed in near future. SLP to continue to closely follow. Diet recommendations accurate on whiteboard in room.        Goals:   Multidisciplinary Problems       SLP Goals          Problem: SLP    Goal Priority Disciplines Outcome   SLP Goal     SLP Ongoing, Progressing   Description: Speech Language Pathology Goals  Goals expected to be met by 12/24    1. Pt will participate in ongoing assessment of swallow function to help determine least restrictive diet                            Plan:     Patient to be seen:  4 x/week   Plan of Care reviewed with:  patient   SLP Follow-Up:  Yes       Discharge recommendations:  High Intensity Therapy   Barriers to Discharge:  None    Time Tracking:     SLP Treatment Date:   01/04/24  Speech Start Time:  0811  Speech Stop Time:  0834     Speech Total Time (min):  23 min    Billable Minutes: Treatment Swallowing Dysfunction 12 and Self Care/Home Management Training 11    01/04/2024

## 2024-01-04 NOTE — PLAN OF CARE
Recommendation/Intervention:   1) Continue current minced and moist diet as tolerated per SLP recs with honey-thick liquids, encourage PO intake  2) If enteral nutrition continues, continue current TF regimen as tolerated, Novasource Renal @ 45 mL/hr 2160 kcal, 98g PRO, and 774ml FF- FWF per MD   - monitor for s/s of intolerance such as residuals >500ml, n/v/d, or abdominal distension  - will monitor need to decrease TF rate if PO intake improves     3) Continue BenePRO BID if CRRT continues (PRO needs will be higher), provides 12g PRO  4) Continue fiber for diarrhea as needed  5) RD to monitor wt, PO intake/tolerance, TF rate/tolerance, skin, labs     Goals: Meet % of EEN/EPN by RD f/u date  Nutrition Goal Status: goal met  Communication of RD Recs:  (POC)

## 2024-01-04 NOTE — PLAN OF CARE
Problem: Physical Therapy  Goal: Physical Therapy Goal  Description: Goals to be met by: 23     Patient will increase functional independence with mobility by performin. Sit to stand transfer with modified independence  2. Bed to chair transfer with supervision  3. Gait  x 150 feet with supervision using physician approved AD with standing rest breaks prn.   4. Lower extremity exercise program x30 reps per handout, with independence  5. Recite 3/3 sternal precautions and remain complaint with precautions throughout session with no verbal cues      Outcome: Ongoing, Progressing   Goals remain appropriate. 2024

## 2024-01-04 NOTE — PLAN OF CARE
Patient cooperative and pleasant with routine care and procedures.  Fall precautions reviewed and patient bed bound at present and only up to chair with max assist.  Turned   Q2 hours and patient compliant.  Spouse at bedside and participates in patient care.  Resting quietly and without complaints.  Will continue to monitor.

## 2024-01-04 NOTE — PROGRESS NOTES
01/04/24 1215   Vital Signs   Pulse 93   Heart Rate Source Monitor   Resp 16   BP (!) 72/0   BP Location Left arm   BP Method Doppler   Patient Position Lying   During Hemodialysis Assessment   Blood Flow Rate (mL/min) 300 mL/min   Dialysate Flow Rate (mL/min) 600 ml/min   Ultrafiltration Rate (mL/Hr) 700 mL/Hr   Arteriovenous Lines Secure Yes   Arterial Pressure (mmHg) -110 mmHg   Venous Pressure (mmHg) 70   Blood Volume Processed (Liters) 12.1 L   UF Removed (mL) 498 mL   TMP 40   Venous Line in Air Detector Yes   Transducer Dry Yes   Access Visible Yes    notified of access issue? N/A   Heparin given? N/A   Intra-Hemodialysis Comments Tolerating treatment well.     Tolerating treatment well. Awake, alert, and oriented x3. No complaints of pain and is calm, skin warm and dry. Obtaining Bps via doppler, as has been done prior, due to NIBP does not usually  a reading well. Patient and vital signs stable.

## 2024-01-04 NOTE — ASSESSMENT & PLAN NOTE
-HeartMate 3 Implanted  12/1/2023  as DT  -HTS Primary  -Implanted by Dr. Washington  -Continue Coumadin, dosing by PharmD.  Goal INR 2.0-3.0 . Therapeutic today.   -Antiplatelets Not on ASA  -LDH is stable overall today. Will continue to monitor daily.  -Weaned off midodrine.  -Speed set at  4900   rpm, LSL 4500 rpm   -Interrogation notable for no events  -ECHO 1/2/24 mild TR, LVEDD 6.25 with HM3 set to 4900 rpm   -Not listed for OHTx, declined for OHTX due to comorbidities   -PT/OT/Speech. Recommending rehab but HD+LVAD may be barrier.       Procedure: Device Interrogation Including analysis of device parameters  Current Settings: Ventricular Assist Device  Review of device function is stable/unstable stable        1/4/2024     7:46 AM 1/4/2024     4:33 AM 1/4/2024    12:00 AM 1/3/2024     7:30 PM 1/3/2024     3:30 PM 1/3/2024    11:56 AM 1/3/2024     7:35 AM   TXP LVAD INTERROGATIONS   Type HeartMate3 HeartMate3 HeartMate3 HeartMate3 HeartMate3 HeartMate3 HeartMate3   Flow 3.8 3.8 3.8 4 3.9 4.1 4.1   Speed 4950 4950 4900 4900 4900 4900 4900   PI 4.8 4.6 4.8 4 4.8 4.6 4.6   Power ([place]) 3.2 3.3 3.2 3.3 3.3 3.4 3.3   LSL 4500   4500 4500 4500 4500   Low Flow Alarm no    no NO no   High Power Alarm no    no NO no   Pulsatility Intermittent pulse Intermittent pulse Intermittent pulse Intermittent pulse Intermittent pulse Intermittent pulse Intermittent pulse

## 2024-01-04 NOTE — PROGRESS NOTES
[facility] - Cardiology Stepdown  Adult Nutrition  Progress Note    SUMMARY     Recommendation/Intervention:   1) Continue current minced and moist diet as tolerated per SLP recs with honey-thick liquids, encourage PO intake  2) If enteral nutrition continues, continue current TF regimen as tolerated, Novasource Renal @ 45 mL/hr 2160 kcal, 98g PRO, and 774ml FF- FWF per MD   - monitor for s/s of intolerance such as residuals >500ml, n/v/d, or abdominal distension  - will monitor need to decrease TF rate if PO intake improves    3) Continue BenePRO BID if CRRT continues (PRO needs will be higher), provides 12g PRO  4) Continue fiber for diarrhea as needed  5) RD to monitor wt, PO intake/tolerance, TF rate/tolerance, skin, labs    Goals: Meet % of EEN/EPN by RD f/u date  Nutrition Goal Status: goal met  Communication of RD Recs:  (POC)    Assessment and Plan    Endocrine  Moderate malnutrition  Malnutrition Type:  Context: acute illness or injury  Level: moderate    Related to (etiology):   Inadequate nutrition intake    Signs and Symptoms (as evidenced by):   Nutritional intake <75% x 7days, observed orbital wasting and trace edema present.     Malnutrition Characteristic Summary:  Energy Intake (Malnutrition): less than 75% for greater than 7 days  Fluid Accumulation (Malnutrition):  (Trace edema)      Interventions/Recommendations (treatment strategy):  Collaboration of nutritional care with other providers.  EN    Nutrition Diagnosis Status:   Continues    Malnutrition Assessment  Malnutrition Context: acute illness or injury  Malnutrition Level: moderate    Energy Intake (Malnutrition): less than 75% for greater than 7 days  Fluid Accumulation (Malnutrition):  (Trace edema)   Orbital Region (Subcutaneous Fat Loss): mild depletion   [place] (Muscle Loss): moderate depletion     Reason for Assessment    Reason For Assessment: RD follow-up  Diagnosis: cardiac disease (CHF/ s/p LVAD on 12/1)  Relevant  Medical History: CAD, DMT2, CHF, PAF  Interdisciplinary Rounds: did not attend  General Information Comments: Pt followed by RD. Modified barium swallow study completed 1/3- SLP recs minced and moist diet texture with honey thick liquids. Diet now advanced. Pt ate ~25% of meal tray today. HD today. LBM 1/1 per flowsheet.  Nutrition Discharge Planning: Cardiac diet education provided 11/15/23- no additional questions for me at this time.    Nutrition Risk Screen    Nutrition Risk Screen: tube feeding or parenteral nutrition    Nutrition/Diet History    Spiritual, Cultural Beliefs, Oriental orthodox Practices, Values that Affect Care: no  Food Allergies: NKFA    Anthropometrics    Temp: 97.6 °F (36.4 °C)  Height Method: Stated  Height: 6' (182.9 cm)  Height (inches): 72 in  Weight Method: Bed Scale  Weight: 73.5 kg (162 lb)  Weight (lb): 162 lb  Ideal Body Weight (IBW), Male: 178 lb  % Ideal Body Weight, Male (lb): 101.69 %  BMI (Calculated): 22  BMI Grade: 18.5-24.9 - normal     Lab/Procedures/Meds    Pertinent Labs Reviewed: reviewed  Pertinent Labs Comments: Hgb: 6.5, Hct: 19.8, Na: 134, BUN: 74, Creatinine: 4.4, eGFR: 14.5, Glucose: 162, Calcium: 8.5, Phosphorus: 1.9, Albumin: 1.9  Pertinent Medications Reviewed: reviewed   sodium chloride 0.9%   Intravenous Once    acetaminophen  1,000 mg Per NG tube TID    amiodarone  400 mg Per NG tube Daily    balsam peru-castor oiL   Topical (Top) BID    epoetin zohaib (PROCRIT) injection  50 Units/kg Subcutaneous Every Tues, Thurs, Sat    famotidine  20 mg Per G Tube Daily    gabapentin  125 mg Per NG tube Q12H    [START ON 1/5/2024] insulin aspart U-100  4 Units Subcutaneous Q24H    [START ON 1/5/2024] insulin aspart U-100  4 Units Subcutaneous Q24H    insulin aspart U-100  4 Units Subcutaneous Q24H    [START ON 1/5/2024] insulin aspart U-100  6 Units Subcutaneous Q24H    insulin aspart U-100  6 Units Subcutaneous Q24H    insulin aspart U-100  6 Units Subcutaneous Q24H     polyethylene glycol  17 g Per NG tube Daily    psyllium husk (aspartame)  1 packet Per NG tube Daily    senna-docusate 8.6-50 mg  2 tablet Per NG tube Daily    traZODone  50 mg Per NG tube QHS    venlafaxine  37.5 mg Per G Tube Daily    warfarin  2.5 mg Per G Tube Every Mon, Wed, Fri    warfarin  5 mg Per G Tube Every Tues, Thurs, Sat, Sun     Estimated/Assessed Needs    Weight Used For Calorie Calculations: 81 kg (178 lb 9.2 oz) (IBW d/t edema present)  Energy Calorie Requirements (kcal): 2025- 2430 kcal  Energy Need Method: Kcal/kg (25-30 kcal/ kg of IBW)  Protein Requirements: 97- 122g (1.2-1.5g/kg of IBW)  Weight Used For Protein Calculations: 81 kg (178 lb 9.2 oz) (IBW d/t edema present.)  Fluid Requirements (mL): 1 ml/kcal or per MD     RDA Method (mL): 2025    Nutrition Prescription Ordered    Current Diet Order: Minced and moist  Nutrition Order Comments: Beneprotein BID via NGT  Current Nutrition Support Formula Ordered: Novasource Renal  Current Nutrition Support Rate Ordered: 45 (ml)  Current Nutrition Support Frequency Ordered: ml/hr x 24hrs    Evaluation of Received Nutrient/Fluid Intake    Enteral Calories (kcal): 2160  Enteral Protein (gm): 98  Enteral (Free Water) Fluid (mL): 774  Other Calories (kcal): 50 (beneprotein BID)  Total Calories (kcal): 2310  % Kcal Needs: 100  Other Protein (gm): 12  Total Protein (gm): 110 (beneprotein BID)  % Protein Needs: 100  I/O: + 2.9 L since 12/21  Energy Calories Required: meeting needs  Protein Required: meeting needs  Fluid Required:  (As per MD)  Total Fluid Intake (mL/kg): 1 ml/kcal or per MD  Comments: LBM 1/1  Tolerance: tolerating  % Intake of Estimated Energy Needs: 75 - 100 %  % Meal Intake: 0 - 25 %    Nutrition Risk    Level of Risk/Frequency of Follow-up:  (Follow-up 1-2x/week)     Monitor and Evaluation    Food and Nutrient Intake: energy intake, food and beverage intake, enteral nutrition intake, parenteral nutrition intake  Food and Nutrient  Adminstration: diet order, enteral and parenteral nutrition administration  Knowledge/Beliefs/Attitudes: food and nutrition knowledge/skill, beliefs and attitudes  Physical Activity and Function: nutrition-related ADLs and IADLs, factors affecting access to physical activity  Anthropometric Measurements: height/length, weight, weight change, body mass index, growth pattern indices/percentile ranks  Biochemical Data, Medical Tests and Procedures: electrolyte and renal panel, gastrointestinal profile, glucose/endocrine profile, inflammatory profile, lipid profile  Nutrition-Focused Physical Findings: overall appearance, extremities, muscles and bones, head and eyes, skin     Nutrition Follow-Up    RD Follow-up?: Yes

## 2024-01-04 NOTE — NURSING
Latest Reference Range & Units 01/04/24 04:45   POCT Glucose 70 - 110 mg/dL 37 (LL)   (LL): Data is critically low    Above specimen inaccurate due to diluted specimen.  Redraw done and Glucose 188.  No intervention necessary.

## 2024-01-04 NOTE — PROGRESS NOTES
01/04/24 1444   Vital Signs   Pulse 93   Heart Rate Source Monitor   Resp 16   BP (!) 84/0   BP Location Left arm   BP Method Doppler   Patient Position Lying   During Hemodialysis Assessment   Blood Flow Rate (mL/min) 300 mL/min   Dialysate Flow Rate (mL/min) 600 ml/min   Ultrafiltration Rate (mL/Hr) 700 mL/Hr   Arteriovenous Lines Secure Yes   Arterial Pressure (mmHg) -120 mmHg   Venous Pressure (mmHg) 80   Blood Volume Processed (Liters) 51.8 L   UF Removed (mL) 2100 mL   TMP 40   Venous Line in Air Detector Yes   Transducer Dry Yes   Access Visible Yes    notified of access issue? N/A   Heparin given? N/A     Completed full three hour hemodialysis treatment. Tolerated procedure well. All blood reinfused. Vital signs stable. RIJ tunneled cath accessed using aseptic technique and flushed with saline and then instilled Heparin 1000 units per ml, 1.6 ML to each port as ordered.

## 2024-01-04 NOTE — PLAN OF CARE
UPDATE:    Patient advanced diet after MBSS  Minced and moist foods  Honey thickened liquids  NGT came out yesterday; however, was replaced--patient requested a hold on feeds over night  after NGT was replaced  Will follow up Monday to assess po intake and possibility of removing NGT in order to place OP HD referral for LVAD needing OP HD chair    Aggressive PT/OT is needed daily--click mobility scores are low at 12/12  Therapy recs are Rehab  Patient's current mobility and LVAD may be a barrier to OP HD placement     Recs aggressive PT/OT, increase po intake as tolerated, pull NGT as soon as possible    Chair orders/labs drawn last week for dispo planning  Will consult PM&R and send rehab referral [facility] to [facility] wanting OP HD referral    Patient remains with NGT which is not accepted by OP HD centers    Patient will need to be taking in 100% meals/liquids po or via a PEG in order to get HD acceptance    The biggest barrier will be placing him in OP HD with LVAD along with low mobility scores      Will send referral to appropriate OP HD centers once all lines/drains that aren't accepted are removed or the case will be kicked back as denied

## 2024-01-04 NOTE — PROGRESS NOTES
[facility] - Cardiology Stepdown  Heart Transplant  Progress Note    Patient Name: Radha Abbott  MRN: [id number]  Admission Date: 11/9/2023  Hospital Length of Stay: 56 days  Attending Physician: Brayan Ocampo MD  Primary Care Provider: Vasu Kong MD  Principal Problem:LVAD (left ventricular assist device) present    Subjective:   Interval History: NAEON. No complaints. Silent aspiration of MBSS. Continue minced/moist and honey thick liquid diet with aspiration precautions.     Continuous Infusions:   sodium chloride 0.9%      dextrose 10 % in water (D10W)       Scheduled Meds:   sodium chloride 0.9%   Intravenous Once    acetaminophen  1,000 mg Per NG tube TID    amiodarone  400 mg Per NG tube Daily    balsam peru-castor oiL   Topical (Top) BID    epoetin zohaib (PROCRIT) injection  50 Units/kg Subcutaneous Every Tues, Thurs, Sat    famotidine  20 mg Per G Tube Daily    gabapentin  125 mg Per NG tube Q12H    insulin aspart U-100  4 Units Subcutaneous 6 times per day    polyethylene glycol  17 g Per NG tube Daily    psyllium husk (aspartame)  1 packet Per NG tube Daily    senna-docusate 8.6-50 mg  2 tablet Per NG tube Daily    traZODone  50 mg Per NG tube QHS    venlafaxine  37.5 mg Per G Tube Daily    warfarin  2.5 mg Per G Tube Every Mon, Wed, Fri    warfarin  5 mg Per G Tube Every Tues, Thurs, Sat, Sun     PRN Meds:bisacodyL, dextrose 10 % in water (D10W), dextrose 10%, dextrose 10%, glucagon (human recombinant), heparin (porcine), hydrALAZINE, insulin aspart U-100, ondansetron, Flushing PICC/Midline Protocol **AND** [DISCONTINUED] sodium chloride 0.9% **AND** sodium chloride 0.9%, traMADoL, zolpidem    Review of patient's allergies indicates:  No Known Allergies  Objective:     Vital Signs (Most Recent):  Temp: 98.3 °F (36.8 °C) (01/04/24 0745)  Pulse: 100 (01/04/24 1000)  Resp: 16 (01/04/24 0745)  BP: 104/71 (01/04/24 0746)  SpO2: (!) 90 % (01/04/24 0745) Vital Signs (24h Range):  Temp:  [97.5 °F (36.4  °C)-98.3 °F (36.8 °C)] 98.3 °F (36.8 °C)  Pulse:  [] 100  Resp:  [16-20] 16  SpO2:  [90 %-99 %] 90 %  BP: ()/(0-71) 104/71     Patient Vitals for the past 72 hrs (Last 3 readings):   Weight   01/04/24 0746 73.5 kg (162 lb)   01/02/24 1440 82.1 kg (181 lb)       Body mass index is 21.97 kg/m².      Intake/Output Summary (Last 24 hours) at 1/4/2024 1022  Last data filed at 1/4/2024 0800  Gross per 24 hour   Intake 750 ml   Output 175 ml   Net 575 ml         Hemodynamic Parameters:  CVP:  [7 mmHg] 7 mmHg    Telemetry: SR       Physical Exam  Constitutional:       Comments: Cachectic, temporal wasting   HENT:      Head: Normocephalic and atraumatic.   Eyes:      Conjunctiva/sclera: Conjunctivae normal.      Pupils: Pupils are equal, round, and reactive to light.   Neck:      Comments: Do not appreciate elevated JVP  Cardiovascular:      Rate and Rhythm: Normal rate and regular rhythm.      Comments: Smooth VAD hum  Pulmonary:      Effort: Pulmonary effort is normal.      Breath sounds: Normal breath sounds.   Abdominal:      General: Bowel sounds are normal.      Palpations: Abdomen is soft.   Musculoskeletal:         General: No swelling. Normal range of motion.      Cervical back: Normal range of motion and neck supple.   Skin:     General: Skin is warm and dry.      Capillary Refill: Capillary refill takes 2 to 3 seconds.   Neurological:      General: No focal deficit present.      Mental Status: He is alert and oriented to person, place, and time.            Significant Labs:  CBC:  Recent Labs   Lab 01/02/24  0431 01/03/24  0500 01/04/24  0453   WBC 7.39 6.27 6.99   RBC 2.41* 2.43* 2.29*   HGB 7.0* 6.9* 6.5*   HCT 22.4* 22.4* 19.8*    441 397   MCV 93 92 87   MCH 29.0 28.4 28.4   MCHC 31.3* 30.8* 32.8       BNP:  Recent Labs   Lab 12/29/23  0406 01/01/24  0412 01/03/24  0500   BNP 2,177* 794* 600*       CMP:  Recent Labs   Lab 01/02/24  0431 01/03/24  0500 01/04/24  0453   * 143* 162*    CALCIUM 8.6* 8.5* 8.5*   ALBUMIN 1.9* 1.9* 1.9*   PROT 6.8 6.9 6.9   * 136 134*   K 4.1 4.3 4.5   CO2 26 24 23   CL 97 100 100   BUN 78* 50* 74*   CREATININE 5.2* 3.5* 4.4*   ALKPHOS 175* 176* 181*   ALT 18 22 29   AST 36 43* 52*   BILITOT 0.3 0.3 0.3        Coagulation:   Recent Labs   Lab 01/02/24  0431 01/03/24  0500 01/04/24  0453   INR 2.2* 2.8* 2.1*       LDH:  Recent Labs   Lab 01/02/24  0431 01/03/24  0500 01/04/24  0453   * 372* 389*       Microbiology:  Microbiology Results (last 7 days)       ** No results found for the last 168 hours. **            I have reviewed all pertinent labs within the past 24 hours.    Estimated Creatinine Clearance: 18.3 mL/min (A) (based on SCr of 4.4 mg/dL (H)).    Diagnostic Results:  I have reviewed and interpreted all pertinent imaging results/findings within the past 24 hours.  Assessment and Plan:     63 year old male with hx of CAD s/p 3v CABG (unclear anatomy, 2009), ICM with a recent EF of 15-20% s/p ICD (medtronik 2009), DM2 (a1c 7.7), HTN, HLD, Vfib on amio who presents to the ED with CC of SOB     Pt was recently admitted to [facility] as a transfer.  He was started on a Lasix gtt and did well.  Started on gDMT and discharged home on  5 with plans to follow up in HTS clinic for ongoing transplant evaluation at another facility.  He says that about a few days ago he started to notice LE swelling.  This turned into Nelson and orthopnea.  He says he can't walk to the bathroom without getting SOB.  He also complains of weight gain.  He takes torsemide 20mg BID at home and was told to trial additional Lasix which he did without any improvement.  He was rx metolazone but has not been filled.  He came to the ED     In the ED he was AF with stable VS on RA.  CBC showing chronic anemia.  CMP notable for acute on chronic CKD with baseline around 2.1 and Cr now 2.6.  BNP elevated.  Lactate neg.  CXR showing pulmonary edema.  I evaluated the pt at the bedside.  Bedside  TTE showing CVP >15.  He was subsequently admitted to the CCU for diuresis.     He was continued on his home  and was started on a lasix gtt, which he responded well to overnight (net -1700cc. He feels much better this morning as well. Our transplant coordinators have been working on insurance approval and he is now being transferred to [facility] service for transplant evaluation.     * LVAD (left ventricular assist device) present  -HeartMate 3 Implanted  12/1/2023  as DT  -HTS Primary  -Implanted by Dr. Washington  -Continue Coumadin, dosing by PharmD.  Goal INR 2.0-3.0 . Therapeutic today.   -Antiplatelets Not on ASA  -LDH is stable overall today. Will continue to monitor daily.  -Weaned off midodrine.  -Speed set at  4900   rpm, LSL 4500 rpm   -Interrogation notable for no events  -ECHO 1/2/24 mild TR, LVEDD 6.25 with HM3 set to 4900 rpm   -Not listed for OHTx, declined for OHTX due to comorbidities   -PT/OT/Speech. Recommending rehab but HD+LVAD may be barrier.       Procedure: Device Interrogation Including analysis of device parameters  Current Settings: Ventricular Assist Device  Review of device function is stable/unstable stable        1/4/2024     7:46 AM 1/4/2024     4:33 AM 1/4/2024    12:00 AM 1/3/2024     7:30 PM 1/3/2024     3:30 PM 1/3/2024    11:56 AM 1/3/2024     7:35 AM   TXP LVAD INTERROGATIONS   Type HeartMate3 HeartMate3 HeartMate3 HeartMate3 HeartMate3 HeartMate3 HeartMate3   Flow 3.8 3.8 3.8 4 3.9 4.1 4.1   Speed 4950 4950 4900 4900 4900 4900 4900   PI 4.8 4.6 4.8 4 4.8 4.6 4.6   Power ([place]) 3.2 3.3 3.2 3.3 3.3 3.4 3.3   LSL 4500   4500 4500 4500 4500   Low Flow Alarm no    no NO no   High Power Alarm no    no NO no   Pulsatility Intermittent pulse Intermittent pulse Intermittent pulse Intermittent pulse Intermittent pulse Intermittent pulse Intermittent pulse         Unilateral complete vocal fold paralysis  -Appreciate ENT's help  -S/P augmentation of paralyzed left vocal cord  12/18    Dysphonia  -Speech following closely  -Appreciate ENT's help. S/P augmentation of paralyzed left vocal cord 12/18    Moderate malnutrition  -RD and SLP following  -Tolerating tube feeds. Total daily caloric intake increased to 2160  -Failed MBBS 12/20, continue TF.  -Speech following. Plan to repeat swallow study today     Depressed mood  -Psychiatry consulted for depressed mood, SI when left alone  -Recommend sitter when alone (possibly with transition to stepdown).  -Psych reconsulted 1/2, agree with venlafaxine and trazadone    IVÁN (acute kidney injury)  -Nephrology following      Acute on chronic combined systolic and diastolic CHF, NYHA class 4  Pt with known ICM with an EF of 25% on home  presented with decompensated HF.    -S/P LVAD    PAF (paroxysmal atrial fibrillation)  Known hx of pAF. In sinus rhythm on admission, but 1 run of AF RVR overnight. He spontaneously converted.  - Continue home amiodarone 400mg qd  - Continue AC      Acute renal failure with acute tubular necrosis superimposed on stage 3b chronic kidney disease  IVÁN on CKD2. Baseline Cr of 1.7-2.0.   - Hold ARNi  -Trend BMPs daily   -Nephrology following   -SLED/SCUF for volume removal began on 12/1. He is anuric  -Did not respond to diuretic challenge with 240 mg IV Lasix, 1000 mg Diuril, and 500 mg IV Diamox done 12/14  -Now on HD  -Tunneled trialysis line placed 12/22 by IR    Type 2 diabetes mellitus without complication, without long-term current use of insulin  -A1c 7.6, not insulin dependent  - Endocrine following, appreciate assistance with blood glucose management    CAD (coronary artery disease)  -S/p 3vCABG in 2009  -Lipitor on hold 2/2 mild transaminitis  -Not on ASA post VAD    Ventricular tachycardia  Hx VT s/p ICD placement (medtronic 2009)  - Continue home amio 400mg qd        ABBI AdamsC  Heart Transplant  [facility] - Cardiology Stepdown

## 2024-01-04 NOTE — PT/OT/SLP PROGRESS
Occupational Therapy   Treatment    Name: Radha Abbott  MRN: [id number]  Admitting Diagnosis:  LVAD (left ventricular assist device) present  31 Days Post-Op    Recommendations:     Discharge Recommendations: High Intensity Therapy  Discharge Equipment Recommendations:  to be determined by next level of care  Barriers to discharge:  None    Assessment:     Radha Abbott is a 63 y.o. male with a medical diagnosis of LVAD (left ventricular assist device) present.  Pt presents with decreased endurance and impaired mobility performance as limited by cardiovascular status and generalized weakness. Pt found upright in bed and agreeable for therapy. Session focused on transfer to chair for upright management of LVAD and donning/adjustment of new holster vest battery carranza. Pt with better EOB balance today however continues to display kyphotic nature despite cues for upright. Pt needed x2 person A to transfer self to chair with quick fatigue once in standing. Pt able to recall 2/3 sternal prec today and is motivated to participate. At this time, pt is a high fall risk and not safe to return home. Patient has demonstrated sufficient progression to warrant high intensity therapy evidenced by objectives noted below.   Performance deficits affecting function are weakness, impaired functional mobility, impaired endurance, impaired self care skills, impaired balance, decreased upper extremity function, decreased lower extremity function, decreased safety awareness.     Rehab Prognosis:  Good; patient would benefit from acute skilled OT services to address these deficits and reach maximum level of function.       Plan:     Patient to be seen 6 x/week to address the above listed problems via self-care/home management, therapeutic activities, therapeutic exercises, neuromuscular re-education  Plan of Care Expires: 02/03/24  Plan of Care Reviewed with: patient    Subjective     Chief Complaint: minor R foot pain not ranked  (chronic)  Patient/Family Comments/goals: to get to chair   Pain/Comfort:  Pain Rating 1: other (see comments) (r foot pain not ranked)  Location - Side 1: Right  Location - Orientation 1: generalized  Location 1: foot  Pain Addressed 1: Reposition, Distraction, Cessation of Activity    Objective:     Communicated with: RN prior to session.  Patient found HOB elevated with telemetry, LVAD, PICC line, NG tube upon OT entry to room.    General Precautions: Standard, fall, LVAD    Orthopedic Precautions:N/A  Braces: N/A  Respiratory Status: Room air     Occupational Performance:     Bed Mobility:    Patient completed Rolling/Turning to Left with  moderate assistance  Patient completed Scooting/Bridging with moderate assistance  Patient completed Supine to Sit with moderate assistance     Functional Mobility/Transfers:  Patient completed Sit <> Stand Transfer with moderate assistance and of 2 persons  with  hand-held assist   Patient completed Bed <> Chair Transfer using Stand Pivot technique with maximal assistance and of 2 persons with hand-held assist  Functional Mobility: Pt stood with mod A x2 from elevated bed, maintaining static stand with mod A x1. Pt completed stand pivot with max A x2 with BLE flexion during quick fatigue during tf.    Activities of Daily Living:  Feeding:  ice chips upright in bed  Upper Body Dressing: maximal assistance donning and adjusting vest once upright in chair  . Pt able to don batteries however needed full A to place into holster vest       AMPA 6 Click ADL: 12    Treatment & Education:  Pt educated on role of occupational therapy, POC, and safety during ADLs and functional mobility. Pt and OT discussed importance of safe, continued mobility to optimize daily living skills. Pt verbalized understanding.   Pt completed the following during session:  Switched from LVAD wall->battery needing increased A with fine motor parts of task (primarily white power cable, did fairly with black  power cable)  Donned LVAD holster vest  White board updated during session. Pt given instruction to call for medical staff/nurse for assistance.       Patient left up in chair with all lines intact, call button in reach, RN notified, and RN/ LVAD coordinator present    GOALS:   Multidisciplinary Problems       Occupational Therapy Goals          Problem: Occupational Therapy    Goal Priority Disciplines Outcome Interventions   Occupational Therapy Goal     OT, PT/OT Ongoing, Progressing    Description: Goals to be met by: 2/3/24    Patient will increase functional independence with ADLs by performing:    UB Dressing with SBA  LE Dressing with Supervision.  Grooming while standing at sink with Supervision.  Toileting from bedside commode with SBA for hygiene and clothing management.   Step transfer to bedside commode with SBA  [place] with VAD management during ADLs                             Time Tracking:     OT Date of Treatment: 01/04/24  OT Start Time: 0910  OT Stop Time: 0949  OT Total Time (min): 39 min    Billable Minutes:Self Care/Home Management 30 min  Therapeutic Activity 9 min    OT/PRIETO: OT     Number of PRIETO visits since last OT visit: 0    1/4/2024

## 2024-01-04 NOTE — ASSESSMENT & PLAN NOTE
BG goal 140-180. Diet advanced. Still on TF @ 45 ml/hr. Excursions noted with additional intake.      - Given diet has been advanced, start Novolog 6 units during 0800, 1200, and 1600 BG checks to cover additional intake. -hold if TF held or BG < 100   - Novolog 4 units 2000, 0000, 0400 to cover TF- hold if TF held or BG < 100   - LDC (150/50) prn q4hr   - POCT Glucose every 4 hours  - Hypoglycemia protocol in place      ** Please notify Endocrine for any change and/or advance in diet**  ** Please call Endocrine for any BG related issues **     Discharge Planning:   TBD. Please notify endocrinology prior to discharge.

## 2024-01-04 NOTE — PT/OT/SLP PROGRESS
Physical Therapy Treatment    Patient Name:  Radha Abbott   MRN:  [id number]    Recommendations:     Discharge Recommendations: High Intensity Therapy  Discharge Equipment Recommendations:  (will determine DME needs closer to discharge)  Barriers to discharge: Decreased caregiver support family may not be able to assist at current functional level.     Assessment:     Radha Abbott is a 63 y.o. male admitted with a medical diagnosis of LVAD (left ventricular assist device) present.  He presents with the following impairments/functional limitations: weakness, gait instability, impaired endurance, impaired balance, decreased lower extremity function, impaired functional mobility, decreased coordination, decreased safety awareness pt tolerated treatment well but pain in  R foot limited functional mobility. Pt will benefit from cont skilled PT 6x/wk to progress physically.  Pt is significantly below previous functional level, increased risk of falls and increased burden of care currently. Patient has demonstrated sufficient progression to warrant high intensity therapy evidenced by objectives noted below.  Pt is s/p LVAD placement 12/1 with sternal closure 12/4/23.    Rehab Prognosis: Good; patient would benefit from acute skilled PT services to address these deficits and reach maximum level of function.    Recent Surgery: Procedure(s) (LRB):  CLOSURE, WOUND, STERNUM (N/A)  INSERTION, GRAFT, PERICARDIUM  DRAINAGE, PLEURAL EFFUSION 31 Days Post-Op    Plan:     During this hospitalization, patient to be seen 6 x/week to address the identified rehab impairments via gait training, therapeutic activities, therapeutic exercises and progress toward the following goals:    Plan of Care Expires:  02/05/24    Subjective     Chief Complaint: pt c/o pain during treatment and stated that he missed his wife.   Patient/Family Comments/goals: to get stronger and go home.   Pain/Comfort:  Pain Rating 1: 8/10 (R foot)  Pain Addressed 1:  Reposition, Distraction  Pain Rating Post-Intervention 1: 8/10 (R foot)      Objective:     Communicated with nurse prior to session.  Patient found up in chair with telemetry, LVAD, PICC line, NG tube upon PT entry to room.     General Precautions: Standard, fall, LVAD  Orthopedic Precautions: N/A  Braces: N/A  Respiratory Status: Room air     Functional Mobility:  Transfers:   pt needed verbal cues for functional mobility with sternal precautions.   Sit to Stand:  moderate assistance and of 2 persons with B platform RW. Pt stood x 2 reps. Pt needed moderate assist to weight shift forward and get center of gravity over base of support for standing balance.     Bed Mobility:   Sit to supine: min assist BLE management  Rolling to R side SBA.     Pt sat in chair x 1 and a half hour. Pt returned to bed due to dialysis.     Gait: pt received gait training ~ 20 ft x 2 reps with B platform RW and CGA. Pt needed verbal cues to hold head upright and to increased base of support with gait training. Pt had sitting rest period between distances gait trained.  Pt was followed by chair for safety. Pt emergency bag present during treatment.       AM-PAC 6 CLICK MOBILITY  Turning over in bed (including adjusting bedclothes, sheets and blankets)?: 3  Sitting down on and standing up from a chair with arms (e.g., wheelchair, bedside commode, etc.): 2  Moving from lying on back to sitting on the side of the bed?: 2  Moving to and from a bed to a chair (including a wheelchair)?: 2  Need to walk in hospital room?: 2  Climbing 3-5 steps with a railing?: 1  Basic Mobility Total Score: 12       Treatment & Education:  Pt received verbal instructions in PT POC and verbally expressed understanding of such.     Pt white board updated with current therapists name and level of mobility assistance needed.     Patient left supine with all lines intact, call button in reach, and RN and dialysis RN present..    GOALS:   Multidisciplinary Problems        Physical Therapy Goals          Problem: Physical Therapy    Goal Priority Disciplines Outcome Goal Variances Interventions   Physical Therapy Goal     PT, PT/OT Ongoing, Progressing     Description: Goals to be met by: 23     Patient will increase functional independence with mobility by performin. Sit to stand transfer with modified independence  2. Bed to chair transfer with supervision  3. Gait  x 150 feet with supervision using physician approved AD with standing rest breaks prn.   4. Lower extremity exercise program x30 reps per handout, with independence  5. Recite 3/3 sternal precautions and remain complaint with precautions throughout session with no verbal cues                     Multidisciplinary Problems (Resolved)          Problem: Physical Therapy    Goal Priority Disciplines Outcome Goal Variances Interventions   Physical Therapy Goal   (Resolved)     PT, PT/OT Met     Description: Goals to be met by: 23     Patient will increase functional independence with mobility by performin. Evaluate pt's need for physical therapy.                          Time Tracking:     PT Received On: 24  PT Start Time: 1000     PT Stop Time: 1023  PT Total Time (min): 23 min     Billable Minutes: Gait Training 23 min     Treatment Type: Treatment  PT/PTA: PT     Number of PTA visits since last PT visit: 0     2024

## 2024-01-04 NOTE — NURSING
Nurses Note -- 4 Eyes      1/4/2024   6:44 AM      Skin assessed during: Q Shift Change      [] No Altered Skin Integrity Present    []Prevention Measures Documented      [x] Yes- Altered Skin Integrity Present or Discovered   [] LDA Added if Not in Epic (Describe Wound)   [] New Altered Skin Integrity was Present on Admit and Documented in LDA   [] Wound Image Taken  No changes in LDA's    Wound Care Consulted? Yes    Attending Nurse:  Lisa Pruitt RN     Second RN/Staff Member:  Toma Schwartz RN

## 2024-01-04 NOTE — SUBJECTIVE & OBJECTIVE
"Interval HPI:   No acute events overnight. Patient in room 312/312 A. Blood glucose stable. BG at and above goal on current insulin regimen (Schedule Insulin and SSI (TPN/TF)). Steroid use- None.   31 Days Post-Op  Renal function- Abnormal - 4.4 cr   Vasopressors-  None     Diet Minced & Moist (IDDSI Level 5)     Eating:   <25%  Nausea: No  Hypoglycemia and intervention: No  Fever: No  TPN and/or TF: yes TF @ 45 ml/hr     BP (!) 76/0 (BP Location: Left arm, Patient Position: Lying)   Pulse 92   Temp 97.6 °F (36.4 °C) (Temporal)   Resp 16   Ht 6' (1.829 m)   Wt 73.5 kg (162 lb)   SpO2 (!) 93%   BMI 21.97 kg/m²     Labs Reviewed and Include    Recent Labs   Lab 01/04/24  0453   *   CALCIUM 8.5*   ALBUMIN 1.9*   PROT 6.9   *   K 4.5   CO2 23      BUN 74*   CREATININE 4.4*   ALKPHOS 181*   ALT 29   AST 52*   BILITOT 0.3     Lab Results   Component Value Date    WBC 6.99 01/04/2024    HGB 6.5 (L) 01/04/2024    HCT 19.8 (LL) 01/04/2024    MCV 87 01/04/2024     01/04/2024     No results for input(s): "TSH", "FREET4" in the last 168 hours.  Lab Results   Component Value Date    HGBA1C 7.6 (H) 10/12/2023       Nutritional status:   Body mass index is 21.97 kg/m².  Lab Results   Component Value Date    ALBUMIN 1.9 (L) 01/04/2024    ALBUMIN 1.9 (L) 01/03/2024    ALBUMIN 1.9 (L) 01/02/2024     Lab Results   Component Value Date    PREALBUMIN 30 01/03/2024    PREALBUMIN 29 01/01/2024    PREALBUMIN 26 12/29/2023       Estimated Creatinine Clearance: 18.3 mL/min (A) (based on SCr of 4.4 mg/dL (H)).    Accu-Checks  Recent Labs     01/03/24  0423 01/03/24  0741 01/03/24  1212 01/03/24  1619 01/03/24  1948 01/03/24  2331 01/04/24  0445 01/04/24  0514 01/04/24  0749 01/04/24  1149   POCTGLUCOSE 164* 155* 143* 194* 204* 111* 37* 189* 204* 219*       Current Medications and/or Treatments Impacting Glycemic Control  Immunotherapy:    Immunosuppressants       None          Steroids:   Hormones (From " admission, onward)      None          Pressors:    Autonomic Drugs (From admission, onward)      None          Hyperglycemia/Diabetes Medications:   Antihyperglycemics (From admission, onward)      Start     Stop Route Frequency Ordered    01/05/24 0800  insulin aspart U-100 pen 6 Units         -- SubQ Every 24 hours (non-standard times) 01/04/24 1214    01/05/24 0400  insulin aspart U-100 pen 4 Units         -- SubQ Every 24 hours (non-standard times) 01/04/24 1214    01/05/24 0000  insulin aspart U-100 pen 4 Units         -- SubQ Every 24 hours (non-standard times) 01/04/24 1214    01/04/24 2000  insulin aspart U-100 pen 4 Units         -- SubQ Every 24 hours (non-standard times) 01/04/24 1214    01/04/24 1600  insulin aspart U-100 pen 6 Units         -- SubQ Every 24 hours (non-standard times) 01/04/24 1214    01/04/24 1213  insulin aspart U-100 pen 6 Units         -- SubQ Every 24 hours (non-standard times) 01/04/24 1214    12/31/23 1108  insulin aspart U-100 pen 0-5 Units         -- SubQ Every 4 hours PRN 12/31/23 1008

## 2024-01-04 NOTE — NURSING
Nurses Note -- 4 Eyes      1/4/2024   07:15      Skin assessed during: Q Shift Change      [] No Altered Skin Integrity Present    []Prevention Measures Documented      [x] Yes- Altered Skin Integrity Present or Discovered   [] LDA Added if Not in Epic (Describe Wound)   [] New Altered Skin Integrity was Present on Admit and Documented in LDA   [] Wound Image Taken  No change in LDA's.     Wound Care Consulted? Yes. Wound care orders in place.     Attending Nurse:  KYAW Wilcox    Second RN/Staff Member:  KYAW Gonzalez

## 2024-01-04 NOTE — PROGRESS NOTES
Order placed for VAD 15 day supply of Silver Kits and charge placed per protocol. Kits to be delivered to patient's bedside.     Pt AAAO, wife not at bedside.  He sat up right in the chair, leaned down once but quickly sat up when asked.  Pt able to correctly recall majority of information from yesterday. He can't remember red heart alarm.  I explained this is huis emergency alarm so it can't be the one he has to think about.    I reminded him to complete his workbook.  Talked with him and his wife about it yesterday.    Further education required.  No questions for me at this time.

## 2024-01-04 NOTE — PROGRESS NOTES
01/04/24 1144   During Hemodialysis Assessment   Blood Flow Rate (mL/min) 300 mL/min   Dialysate Flow Rate (mL/min) 600 ml/min   Ultrafiltration Rate (mL/Hr) 700 mL/Hr   Arteriovenous Lines Secure Yes   Arterial Pressure (mmHg) -90 mmHg   Venous Pressure (mmHg) 50   Blood Volume Processed (Liters) 0 L   UF Removed (mL) 0 mL   TMP 40   Venous Line in Air Detector Yes   Transducer Dry Yes   Access Visible Yes    notified of access issue? N/A   Heparin given? N/A   Intra-Hemodialysis Comments Treatment started.     HD treatment started. RIJ tunneled dialysis cath accessed using aseptic technique. HD treatment started as ordered.

## 2024-01-04 NOTE — PROGRESS NOTES
[facility] - [facility]  Nephrology  Progress Note    Patient Name: Radha Abbott  MRN: [id number]  Admission Date: 11/9/2023  Hospital Length of Stay: 56 days  Attending Provider: Brayan Ocampo MD   Primary Care Physician: Vasu Kong MD  Principal Problem:LVAD (left ventricular assist device) present    Subjective:     Interval History:   No complaints this AM. 24 hr  mL. sCr up to 4.4 from 3.5. Other electrolytes stable. Plans for HD today.     Review of patient's allergies indicates:  No Known Allergies  Current Facility-Administered Medications   Medication Frequency    0.9%  NaCl infusion Continuous    0.9%  NaCl infusion Once    acetaminophen tablet 1,000 mg TID    amiodarone tablet 400 mg Daily    balsam peru-castor oiL Oint BID    bisacodyL suppository 10 mg Daily PRN    dextrose 10 % infusion Continuous PRN    dextrose 10% bolus 125 mL 125 mL PRN    dextrose 10% bolus 250 mL 250 mL PRN    epoetin zohaib injection 8,100 Units Every Tues, Thurs, Sat    famotidine tablet 20 mg Daily    gabapentin 250 mg/5 mL (5 mL) solution 125 mg Q12H    glucagon (human recombinant) injection 1 mg PRN    heparin (porcine) injection 1,000 Units PRN    hydrALAZINE injection 10 mg Q6H PRN    insulin aspart U-100 pen 0-5 Units Q4H PRN    insulin aspart U-100 pen 4 Units 6 times per day    ondansetron injection 4 mg Q6H PRN    polyethylene glycol packet 17 g Daily    psyllium husk (aspartame) 3.4 gram packet 1 packet Daily    senna-docusate 8.6-50 mg per tablet 2 tablet Daily    sodium chloride 0.9% flush 10 mL PRN    traMADoL tablet 50 mg Q8H PRN    traZODone tablet 50 mg QHS    venlafaxine tablet 37.5 mg Daily    warfarin (COUMADIN) tablet 2.5 mg Every Mon, Wed, Fri    warfarin (COUMADIN) tablet 5 mg Every Tues, Thurs, Sat, Sun    zolpidem tablet 5 mg Nightly PRN       Objective:     Vital Signs (Most Recent):  Temp: 97.5 °F (36.4 °C) (01/04/24 1140)  Pulse: 92 (01/04/24 1145)  Resp: 16 (01/04/24 1145)  BP:  90/66 (01/04/24 1145)  SpO2: (!) 90 % (01/04/24 0745) Vital Signs (24h Range):  Temp:  [97.5 °F (36.4 °C)-98.3 °F (36.8 °C)] 97.5 °F (36.4 °C)  Pulse:  [] 92  Resp:  [16-20] 16  SpO2:  [90 %-99 %] 90 %  BP: ()/(0-71) 90/66     Weight: 73.5 kg (162 lb) (01/04/24 0746)  Body mass index is 21.97 kg/m².  Body surface area is 1.93 meters squared.    I/O last 3 completed shifts:  In: 790 [P.O.:150; NG/GT:640]  Out: 175 [Urine:175]     Physical Exam  Vitals and nursing note reviewed.   HENT:      Head: Normocephalic and atraumatic.      Mouth/Throat:      Mouth: Mucous membranes are dry.   Eyes:      Conjunctiva/sclera: Conjunctivae normal.   Cardiovascular:      Comments: LVAD   Pulmonary:      Effort: Pulmonary effort is normal.      Comments: RA  Abdominal:      Palpations: Abdomen is soft.   Musculoskeletal:         General: No swelling.      Right lower leg: No edema.      Left lower leg: No edema.   Skin:     General: Skin is warm.   Neurological:      Mental Status: He is alert and oriented to person, place, and time.   Psychiatric:         Mood and Affect: Mood normal.         Behavior: Behavior normal.          Significant Labs:  CBC:   Recent Labs   Lab 01/04/24  0453   WBC 6.99   RBC 2.29*   HGB 6.5*   HCT 19.8*      MCV 87   MCH 28.4   MCHC 32.8     CMP:   Recent Labs   Lab 01/04/24  0453   *   CALCIUM 8.5*   ALBUMIN 1.9*   PROT 6.9   *   K 4.5   CO2 23      BUN 74*   CREATININE 4.4*   ALKPHOS 181*   ALT 29   AST 52*   BILITOT 0.3     All labs within the past 24 hours have been reviewed.   Assessment/Plan:     Cardiac/Vascular  * LVAD (left ventricular assist device) present  -management per primary     Acute on chronic combined systolic and diastolic CHF, NYHA class 4  - management per primary team  - LVAD for mechanical support    Renal/  Acute renal failure with acute tubular necrosis superimposed on stage 3b chronic kidney disease  - suspect acute tubular injury  secondary to hypotension/cardiogenic shock likely compounded by intra-arterial contrast and anemia with urinary sediment with granular casts    -Will plan for iHD today for clearance and volume management. Target UF 1.5 L as tolerated, keep MAP >65.  -Will plan for Lasix challenge tomorrow on non HD day. Will plan for Lasix 160 mg IV x 1.   -Hgb 6.5 - will plan for EPO with HD.   -Will continue TuThSat HD while inpatient.   -Continue bladder scans q shift  -strict I/O's  -renal diet/tube feeds when not NPO, volume restriction per primary team  -renally all dose medications to eGFR  -avoid nephrotoxic agents wean feasible (i.e. NSAIDs, intra-arterial contrast, supra-therapeutic vancomycin levels, etc.)      Thank you for your consult. I will follow-up with patient. Please contact us if you have any additional questions.    Matilda Hinton DNP, FNP-C  Nephrology  [facility]   grossly assessed due to/3/5

## 2024-01-04 NOTE — PROGRESS NOTES
[facility] - [facility] Step[facility]  Endocrinology  Progress Note    Admit Date: 11/9/2023     Reason for Consult: Management of T2DM, Hyperglycemia     Surgical Procedure and Date: n/a    Diabetes diagnosis year: 1998    Home Diabetes Medications:  Metformin (off since October)   Lab Results   Component Value Date    HGBA1C 7.6 (H) 10/12/2023       How often checking glucose at home?  Once daily in the AM    BG readings on regimen: 150-160s  Hypoglycemia on the regimen?  No  Missed doses on regimen?  n/a    Diabetes Complications include:     Hyperglycemia    Complicating diabetes co morbidities:   CAD s/p CABG, HTN, HLD      HPI:   Patient is a 63 y.o. male with a diagnosis of CAD s/p 3v CABG (unclear anatomy, 2009), ICM with a recent EF of 15-20% s/p ICD (medtronik 2009), DM2 (a1c 7.7), HTN, HLD, Vfib on amio who presents to the ED with CC of SOB. In the ED he was AF with stable VS on RA.  CBC showing chronic anemia.  CMP notable for acute on chronic CKD with baseline around 2.1 and Cr now 2.6.  BNP elevated.  Lactate neg.  CXR showing pulmonary edema. He was subsequently admitted to the CCU for diuresis.  Endocrinology consulted for management of T2DM.          Interval HPI:   No acute events overnight. Patient in room 312/312 A. Blood glucose stable. BG at and above goal on current insulin regimen (Schedule Insulin and SSI (TPN/TF)). Steroid use- None.   31 Days Post-Op  Renal function- Abnormal - 4.4 cr   Vasopressors-  None     Diet Minced & Moist (IDDSI Level 5)     Eating:   <25%  Nausea: No  Hypoglycemia and intervention: No  Fever: No  TPN and/or TF: yes TF @ 45 ml/hr     BP (!) 76/0 (BP Location: Left arm, Patient Position: Lying)   Pulse 92   Temp 97.6 °F (36.4 °C) (Temporal)   Resp 16   Ht 6' (1.829 m)   Wt 73.5 kg (162 lb)   SpO2 (!) 93%   BMI 21.97 kg/m²     Labs Reviewed and Include    Recent Labs   Lab 01/04/24  0453   *   CALCIUM 8.5*   ALBUMIN 1.9*   PROT 6.9   *   K 4.5   CO2 23  "     BUN 74*   CREATININE 4.4*   ALKPHOS 181*   ALT 29   AST 52*   BILITOT 0.3     Lab Results   Component Value Date    WBC 6.99 01/04/2024    HGB 6.5 (L) 01/04/2024    HCT 19.8 (LL) 01/04/2024    MCV 87 01/04/2024     01/04/2024     No results for input(s): "TSH", "FREET4" in the last 168 hours.  Lab Results   Component Value Date    HGBA1C 7.6 (H) 10/12/2023       Nutritional status:   Body mass index is 21.97 kg/m².  Lab Results   Component Value Date    ALBUMIN 1.9 (L) 01/04/2024    ALBUMIN 1.9 (L) 01/03/2024    ALBUMIN 1.9 (L) 01/02/2024     Lab Results   Component Value Date    PREALBUMIN 30 01/03/2024    PREALBUMIN 29 01/01/2024    PREALBUMIN 26 12/29/2023       Estimated Creatinine Clearance: 18.3 mL/min (A) (based on SCr of 4.4 mg/dL (H)).    Accu-Checks  Recent Labs     01/03/24  0423 01/03/24  0741 01/03/24  1212 01/03/24  1619 01/03/24  1948 01/03/24  2331 01/04/24  0445 01/04/24  0514 01/04/24  0749 01/04/24  1149   POCTGLUCOSE 164* 155* 143* 194* 204* 111* 37* 189* 204* 219*       Current Medications and/or Treatments Impacting Glycemic Control  Immunotherapy:    Immunosuppressants       None          Steroids:   Hormones (From admission, onward)      None          Pressors:    Autonomic Drugs (From admission, onward)      None          Hyperglycemia/Diabetes Medications:   Antihyperglycemics (From admission, onward)      Start     Stop Route Frequency Ordered    01/05/24 0800  insulin aspart U-100 pen 6 Units         -- SubQ Every 24 hours (non-standard times) 01/04/24 1214    01/05/24 0400  insulin aspart U-100 pen 4 Units         -- SubQ Every 24 hours (non-standard times) 01/04/24 1214    01/05/24 0000  insulin aspart U-100 pen 4 Units         -- SubQ Every 24 hours (non-standard times) 01/04/24 1214    01/04/24 2000  insulin aspart U-100 pen 4 Units         -- SubQ Every 24 hours (non-standard times) 01/04/24 1214    01/04/24 1600  insulin aspart U-100 pen 6 Units         -- SubQ " Every 24 hours (non-standard times) 01/04/24 1214    01/04/24 1213  insulin aspart U-100 pen 6 Units         -- SubQ Every 24 hours (non-standard times) 01/04/24 1214    12/31/23 1108  insulin aspart U-100 pen 0-5 Units         -- SubQ Every 4 hours PRN 12/31/23 1008            ASSESSMENT and PLAN    Cardiac/Vascular  * LVAD (left ventricular assist device) present  Optimize BG control to improve wound healing        Renal/  IVÁN (acute kidney injury)  Avoid insulin stacking and hypoglycemia.        Endocrine  Type 2 diabetes mellitus without complication, without long-term current use of insulin  BG goal 140-180. Diet advanced. Still on TF @ 45 ml/hr. Excursions noted with additional intake.      - Given diet has been advanced, start Novolog 6 units during 0800, 1200, and 1600 BG checks to cover additional intake. -hold if TF held or BG < 100   - Novolog 4 units 2000, 0000, 0400 to cover TF- hold if TF held or BG < 100   - LDC (150/50) prn q4hr   - POCT Glucose every 4 hours  - Hypoglycemia protocol in place      ** Please notify Endocrine for any change and/or advance in diet**  ** Please call Endocrine for any BG related issues **     Discharge Planning:   TBD. Please notify endocrinology prior to discharge.                 Ebony Carlson PA-C  Endocrinology  [facility]

## 2024-01-04 NOTE — ASSESSMENT & PLAN NOTE
- suspect acute tubular injury secondary to hypotension/cardiogenic shock likely compounded by intra-arterial contrast and anemia with urinary sediment with granular casts    -Will plan for iHD today for clearance and volume management. Target UF 1.5 L as tolerated, keep MAP >65.  -Will plan for Lasix challenge tomorrow on non HD day. Will plan for Lasix 160 mg IV x 1.   -Hgb 6.5 - will plan for EPO with HD.   -Will continue TuThSat HD while inpatient.   -Continue bladder scans q shift  -strict I/O's  -renal diet/tube feeds when not NPO, volume restriction per primary team  -renally all dose medications to eGFR  -avoid nephrotoxic agents wean feasible (i.e. NSAIDs, intra-arterial contrast, supra-therapeutic vancomycin levels, etc.)

## 2024-01-05 PROBLEM — R59.1 LYMPHADENOPATHY: Status: ACTIVE | Noted: 2024-01-05

## 2024-01-05 PROBLEM — Z91.89 AT HIGH RISK FOR SKIN BREAKDOWN: Status: ACTIVE | Noted: 2024-01-05

## 2024-01-05 LAB
ALBUMIN SERPL BCP-MCNC: 2.2 G/DL (ref 3.5–5.2)
ALP SERPL-CCNC: 181 U/L (ref 55–135)
ALT SERPL W/O P-5'-P-CCNC: 41 U/L (ref 10–44)
ANION GAP SERPL CALC-SCNC: 19 MMOL/L (ref 8–16)
AST SERPL-CCNC: 68 U/L (ref 10–40)
BASOPHILS # BLD AUTO: 0.08 K/UL (ref 0–0.2)
BASOPHILS NFR BLD: 1.1 % (ref 0–1.9)
BILIRUB DIRECT SERPL-MCNC: 0.2 MG/DL (ref 0.1–0.3)
BILIRUB SERPL-MCNC: 0.4 MG/DL (ref 0.1–1)
BNP SERPL-MCNC: 670 PG/ML (ref 0–99)
BUN SERPL-MCNC: 46 MG/DL (ref 8–23)
CA-I BLDV-SCNC: 1.13 MMOL/L (ref 1.06–1.42)
CALCIUM SERPL-MCNC: 9.1 MG/DL (ref 8.7–10.5)
CHLORIDE SERPL-SCNC: 100 MMOL/L (ref 95–110)
CO2 SERPL-SCNC: 23 MMOL/L (ref 23–29)
CREAT SERPL-MCNC: 3.6 MG/DL (ref 0.5–1.4)
DIFFERENTIAL METHOD BLD: ABNORMAL
EOSINOPHIL # BLD AUTO: 0.1 K/UL (ref 0–0.5)
EOSINOPHIL NFR BLD: 1.2 % (ref 0–8)
ERYTHROCYTE [DISTWIDTH] IN BLOOD BY AUTOMATED COUNT: 16.3 % (ref 11.5–14.5)
EST. GFR  (NO RACE VARIABLE): 18.4 ML/MIN/1.73 M^2
FERRITIN SERPL-MCNC: 4749 NG/ML (ref 20–300)
GLUCOSE SERPL-MCNC: 113 MG/DL (ref 70–110)
GROUP A STREP, MOLECULAR: NEGATIVE
HCT VFR BLD AUTO: 24.9 % (ref 40–54)
HGB BLD-MCNC: 7.6 G/DL (ref 14–18)
IMM GRANULOCYTES # BLD AUTO: 0.04 K/UL (ref 0–0.04)
IMM GRANULOCYTES NFR BLD AUTO: 0.6 % (ref 0–0.5)
INFLUENZA A, MOLECULAR: NEGATIVE
INFLUENZA A, MOLECULAR: NOT DETECTED
INFLUENZA B, MOLECULAR: NEGATIVE
INFLUENZA B, MOLECULAR: NOT DETECTED
INR PPP: 2.1 (ref 0.8–1.2)
IRON SERPL-MCNC: 25 UG/DL (ref 45–160)
LYMPHOCYTES # BLD AUTO: 0.8 K/UL (ref 1–4.8)
LYMPHOCYTES NFR BLD: 10.5 % (ref 18–48)
MAGNESIUM SERPL-MCNC: 2.1 MG/DL (ref 1.6–2.6)
MCH RBC QN AUTO: 28.1 PG (ref 27–31)
MCHC RBC AUTO-ENTMCNC: 30.5 G/DL (ref 32–36)
MCV RBC AUTO: 92 FL (ref 82–98)
MONOCYTES # BLD AUTO: 0.6 K/UL (ref 0.3–1)
MONOCYTES NFR BLD: 7.9 % (ref 4–15)
NEUTROPHILS # BLD AUTO: 5.7 K/UL (ref 1.8–7.7)
NEUTROPHILS NFR BLD: 78.7 % (ref 38–73)
NRBC BLD-RTO: 0 /100 WBC
PHOSPHATE SERPL-MCNC: 2.5 MG/DL (ref 2.7–4.5)
PLATELET # BLD AUTO: 484 K/UL (ref 150–450)
PMV BLD AUTO: 9.5 FL (ref 9.2–12.9)
POCT GLUCOSE: 117 MG/DL (ref 70–110)
POCT GLUCOSE: 120 MG/DL (ref 70–110)
POCT GLUCOSE: 121 MG/DL (ref 70–110)
POCT GLUCOSE: 121 MG/DL (ref 70–110)
POCT GLUCOSE: 146 MG/DL (ref 70–110)
POCT GLUCOSE: 169 MG/DL (ref 70–110)
POTASSIUM SERPL-SCNC: 4.3 MMOL/L (ref 3.5–5.1)
PREALB SERPL-MCNC: 35 MG/DL (ref 20–43)
PROT SERPL-MCNC: 8 G/DL (ref 6–8.4)
PROTHROMBIN TIME: 21.1 SEC (ref 9–12.5)
RBC # BLD AUTO: 2.7 M/UL (ref 4.6–6.2)
RSV AG BY MOLECULAR METHOD: NOT DETECTED
SARS-COV-2 RNA RESP QL NAA+PROBE: NOT DETECTED
SATURATED IRON: 9 % (ref 20–50)
SODIUM SERPL-SCNC: 142 MMOL/L (ref 136–145)
SPECIMEN SOURCE: NORMAL
TOTAL IRON BINDING CAPACITY: 272 UG/DL (ref 250–450)
TRANSFERRIN SERPL-MCNC: 184 MG/DL (ref 200–375)
WBC # BLD AUTO: 7.21 K/UL (ref 3.9–12.7)

## 2024-01-05 PROCEDURE — 25000003 PHARM REV CODE 250: Performed by: INTERNAL MEDICINE

## 2024-01-05 PROCEDURE — 27000248 HC VAD-ADDITIONAL DAY

## 2024-01-05 PROCEDURE — 83735 ASSAY OF MAGNESIUM: CPT | Performed by: PHYSICIAN ASSISTANT

## 2024-01-05 PROCEDURE — 63600175 PHARM REV CODE 636 W HCPCS

## 2024-01-05 PROCEDURE — 82330 ASSAY OF CALCIUM: CPT | Performed by: PHYSICIAN ASSISTANT

## 2024-01-05 PROCEDURE — 84100 ASSAY OF PHOSPHORUS: CPT | Performed by: PHYSICIAN ASSISTANT

## 2024-01-05 PROCEDURE — 99233 SBSQ HOSP IP/OBS HIGH 50: CPT | Mod: ,,, | Performed by: NURSE PRACTITIONER

## 2024-01-05 PROCEDURE — 25000003 PHARM REV CODE 250: Performed by: NURSE PRACTITIONER

## 2024-01-05 PROCEDURE — 36415 COLL VENOUS BLD VENIPUNCTURE: CPT | Performed by: PHYSICIAN ASSISTANT

## 2024-01-05 PROCEDURE — 82728 ASSAY OF FERRITIN: CPT | Performed by: PHYSICIAN ASSISTANT

## 2024-01-05 PROCEDURE — 85610 PROTHROMBIN TIME: CPT | Performed by: PHYSICIAN ASSISTANT

## 2024-01-05 PROCEDURE — 92526 ORAL FUNCTION THERAPY: CPT

## 2024-01-05 PROCEDURE — 85025 COMPLETE CBC W/AUTO DIFF WBC: CPT | Performed by: PHYSICIAN ASSISTANT

## 2024-01-05 PROCEDURE — 93750 INTERROGATION VAD IN PERSON: CPT | Mod: ,,, | Performed by: INTERNAL MEDICINE

## 2024-01-05 PROCEDURE — 20600001 HC STEP DOWN PRIVATE ROOM

## 2024-01-05 PROCEDURE — 99232 SBSQ HOSP IP/OBS MODERATE 35: CPT | Mod: ,,,

## 2024-01-05 PROCEDURE — 83540 ASSAY OF IRON: CPT | Performed by: PHYSICIAN ASSISTANT

## 2024-01-05 PROCEDURE — 80076 HEPATIC FUNCTION PANEL: CPT | Performed by: PHYSICIAN ASSISTANT

## 2024-01-05 PROCEDURE — 84134 ASSAY OF PREALBUMIN: CPT | Performed by: PHYSICIAN ASSISTANT

## 2024-01-05 PROCEDURE — 83880 ASSAY OF NATRIURETIC PEPTIDE: CPT | Performed by: PHYSICIAN ASSISTANT

## 2024-01-05 PROCEDURE — 63600175 PHARM REV CODE 636 W HCPCS: Mod: JZ,JG | Performed by: PHYSICIAN ASSISTANT

## 2024-01-05 PROCEDURE — 87502 INFLUENZA DNA AMP PROBE: CPT | Performed by: PHYSICIAN ASSISTANT

## 2024-01-05 PROCEDURE — 80048 BASIC METABOLIC PNL TOTAL CA: CPT | Performed by: PHYSICIAN ASSISTANT

## 2024-01-05 PROCEDURE — 25000003 PHARM REV CODE 250

## 2024-01-05 PROCEDURE — 87651 STREP A DNA AMP PROBE: CPT | Performed by: PHYSICIAN ASSISTANT

## 2024-01-05 PROCEDURE — 99232 SBSQ HOSP IP/OBS MODERATE 35: CPT | Mod: ,,, | Performed by: STUDENT IN AN ORGANIZED HEALTH CARE EDUCATION/TRAINING PROGRAM

## 2024-01-05 PROCEDURE — 0241U SARS-COV2 (COVID) WITH FLU/RSV BY PCR: CPT | Performed by: PHYSICIAN ASSISTANT

## 2024-01-05 PROCEDURE — 99233 SBSQ HOSP IP/OBS HIGH 50: CPT | Mod: ,,, | Performed by: PHYSICIAN ASSISTANT

## 2024-01-05 RX ORDER — OSELTAMIVIR PHOSPHATE 30 MG/1
30 CAPSULE ORAL
Status: COMPLETED | OUTPATIENT
Start: 2024-01-06 | End: 2024-01-11

## 2024-01-05 RX ORDER — TRAMADOL HYDROCHLORIDE 50 MG/1
50 TABLET ORAL ONCE
Status: DISCONTINUED | OUTPATIENT
Start: 2024-01-05 | End: 2024-01-09

## 2024-01-05 RX ORDER — CHOLECALCIFEROL (VITAMIN D3) 10(400)/ML
2000 DROPS ORAL DAILY
Status: DISCONTINUED | OUTPATIENT
Start: 2024-01-05 | End: 2024-01-17

## 2024-01-05 RX ADMIN — SENNOSIDES AND DOCUSATE SODIUM 2 TABLET: 8.6; 5 TABLET ORAL at 10:01

## 2024-01-05 RX ADMIN — Medication: at 10:01

## 2024-01-05 RX ADMIN — ACETAMINOPHEN 1000 MG: 500 TABLET ORAL at 10:01

## 2024-01-05 RX ADMIN — ACETAMINOPHEN 1000 MG: 500 TABLET ORAL at 05:01

## 2024-01-05 RX ADMIN — GABAPENTIN 125 MG: 250 SOLUTION ORAL at 10:01

## 2024-01-05 RX ADMIN — INSULIN ASPART 4 UNITS: 100 INJECTION, SOLUTION INTRAVENOUS; SUBCUTANEOUS at 12:01

## 2024-01-05 RX ADMIN — GABAPENTIN 125 MG: 250 SOLUTION ORAL at 08:01

## 2024-01-05 RX ADMIN — AMIODARONE HYDROCHLORIDE 400 MG: 200 TABLET ORAL at 10:01

## 2024-01-05 RX ADMIN — INSULIN ASPART 4 UNITS: 100 INJECTION, SOLUTION INTRAVENOUS; SUBCUTANEOUS at 08:01

## 2024-01-05 RX ADMIN — FAMOTIDINE 20 MG: 20 TABLET, FILM COATED ORAL at 10:01

## 2024-01-05 RX ADMIN — Medication 2000 UNITS: at 05:01

## 2024-01-05 RX ADMIN — VENLAFAXINE 37.5 MG: 37.5 TABLET ORAL at 10:01

## 2024-01-05 RX ADMIN — INSULIN ASPART 6 UNITS: 100 INJECTION, SOLUTION INTRAVENOUS; SUBCUTANEOUS at 12:01

## 2024-01-05 RX ADMIN — INSULIN ASPART 4 UNITS: 100 INJECTION, SOLUTION INTRAVENOUS; SUBCUTANEOUS at 11:01

## 2024-01-05 RX ADMIN — ALTEPLASE 2 MG: 2.2 INJECTION, POWDER, LYOPHILIZED, FOR SOLUTION INTRAVENOUS at 09:01

## 2024-01-05 RX ADMIN — INSULIN ASPART 6 UNITS: 100 INJECTION, SOLUTION INTRAVENOUS; SUBCUTANEOUS at 05:01

## 2024-01-05 RX ADMIN — TRAZODONE HYDROCHLORIDE 50 MG: 50 TABLET ORAL at 08:01

## 2024-01-05 RX ADMIN — WARFARIN SODIUM 2.5 MG: 2.5 TABLET ORAL at 05:01

## 2024-01-05 NOTE — PT/OT/SLP PROGRESS
Physical Therapy      Patient Name:  Radha Abbott   MRN:  [id number]    Patient not seen today secondary to  (pt refused therapy today, telling OT that he has 8/10 pain in his mouth and would work tomorrow.). Will follow-up at a later date.    1/5/2024    .

## 2024-01-05 NOTE — ASSESSMENT & PLAN NOTE
BG goal 140-180. Diet advanced. Still on TF @ 45 ml/hr. Excursions noted with additional intake.      - Novolog 6 units during 0800, 1200, and 1600 BG checks to cover additional intake. -hold if TF held or BG < 100   - Novolog 4 units 2000, 0000, 0400 to cover TF- hold if TF held or BG < 100   - LDC (150/50) prn q4hr   - POCT Glucose every 4 hours  - Hypoglycemia protocol in place      ** Please notify Endocrine for any change and/or advance in diet**  ** Please call Endocrine for any BG related issues **     Discharge Planning:   TBD. Please notify endocrinology prior to discharge.

## 2024-01-05 NOTE — PROGRESS NOTES
"[facility] - [facility] Step[facility]  Endocrinology  Progress Note    Admit Date: 2023     Reason for Consult: Management of T2DM, Hyperglycemia     Surgical Procedure and Date: n/a    Diabetes diagnosis year:     Home Diabetes Medications:  Metformin (off since October)   Lab Results   Component Value Date    HGBA1C 7.6 (H) 10/12/2023       How often checking glucose at home?  Once daily in the AM    BG readings on regimen: 150-160s  Hypoglycemia on the regimen?  No  Missed doses on regimen?  n/a    Diabetes Complications include:     Hyperglycemia    Complicating diabetes co morbidities:   CAD s/p CABG, HTN, HLD      HPI:   Patient is a 63 y.o. male with a diagnosis of CAD s/p 3v CABG (unclear anatomy, ), ICM with a recent EF of 15-20% s/p ICD (medtronik ), DM2 (a1c 7.7), HTN, HLD, Vfib on amio who presents to the ED with CC of SOB. In the ED he was AF with stable VS on RA.  CBC showing chronic anemia.  CMP notable for acute on chronic CKD with baseline around 2.1 and Cr now 2.6.  BNP elevated.  Lactate neg.  CXR showing pulmonary edema. He was subsequently admitted to the CCU for diuresis.  Endocrinology consulted for management of T2DM.          Interval HPI:   No acute events overnight. Patient in room 312/312 A. Blood glucose stable. BG at goal on current insulin regimen (Schedule Insulin and SSI (TPN/TF)). Steroid use- None.   32 Days Post-Op  Renal function- Abnormal - 4.4 cr    Vasopressors-  None     Diet Minced & Moist (IDDSI Level 5)     Eatin-50%  Nausea: No  Hypoglycemia and intervention: No  Fever: No  TPN and/or TF: Yes TF @ 45 mL- however, TF currently being held as well as scheduled novolog    BP (!) 80/0 (BP Location: Left arm, Patient Position: Lying)   Pulse 72   Temp 97.8 °F (36.6 °C) (Axillary)   Resp 18   Ht 6' (1.829 m)   Wt 72.6 kg (160 lb)   SpO2 100%   BMI 21.70 kg/m²     Labs Reviewed and Include    No results for input(s): "GLU", "CALCIUM", "ALBUMIN", "PROT", " ""NA", "K", "CO2", "CL", "BUN", "CREATININE", "ALKPHOS", "ALT", "AST", "BILITOT" in the last 24 hours.  Lab Results   Component Value Date    WBC 6.99 01/04/2024    HGB 6.5 (L) 01/04/2024    HCT 19.8 (LL) 01/04/2024    MCV 87 01/04/2024     01/04/2024     No results for input(s): "TSH", "FREET4" in the last 168 hours.  Lab Results   Component Value Date    HGBA1C 7.6 (H) 10/12/2023       Nutritional status:   Body mass index is 21.7 kg/m².  Lab Results   Component Value Date    ALBUMIN 1.9 (L) 01/04/2024    ALBUMIN 1.9 (L) 01/03/2024    ALBUMIN 1.9 (L) 01/02/2024     Lab Results   Component Value Date    PREALBUMIN 30 01/03/2024    PREALBUMIN 29 01/01/2024    PREALBUMIN 26 12/29/2023       Estimated Creatinine Clearance: 18.1 mL/min (A) (based on SCr of 4.4 mg/dL (H)).    Accu-Checks  Recent Labs     01/03/24  1948 01/03/24  2331 01/04/24  0445 01/04/24  0514 01/04/24  0749 01/04/24  1149 01/04/24  2015 01/04/24  2334 01/05/24  0403 01/05/24  0817   POCTGLUCOSE 204* 111* 37* 189* 204* 219* 149* 149* 121* 121*       Current Medications and/or Treatments Impacting Glycemic Control  Immunotherapy:    Immunosuppressants       None          Steroids:   Hormones (From admission, onward)      None          Pressors:    Autonomic Drugs (From admission, onward)      None          Hyperglycemia/Diabetes Medications:   Antihyperglycemics (From admission, onward)      Start     Stop Route Frequency Ordered    01/05/24 0800  insulin aspart U-100 pen 6 Units         -- SubQ Every 24 hours (non-standard times) 01/04/24 1214    01/05/24 0400  insulin aspart U-100 pen 4 Units         -- SubQ Every 24 hours (non-standard times) 01/04/24 1214    01/05/24 0000  insulin aspart U-100 pen 4 Units         -- SubQ Every 24 hours (non-standard times) 01/04/24 1214    01/04/24 2000  insulin aspart U-100 pen 4 Units         -- SubQ Every 24 hours (non-standard times) 01/04/24 1214 01/04/24 1600  insulin aspart U-100 pen 6 Units      "    -- SubQ Every 24 hours (non-standard times) 01/04/24 1214    01/04/24 1213  insulin aspart U-100 pen 6 Units         -- SubQ Every 24 hours (non-standard times) 01/04/24 1214    12/31/23 1108  insulin aspart U-100 pen 0-5 Units         -- SubQ Every 4 hours PRN 12/31/23 1008            ASSESSMENT and PLAN    Cardiac/Vascular  * LVAD (left ventricular assist device) present  Optimize BG control to improve wound healing        Renal/  IVÁN (acute kidney injury)  Avoid insulin stacking and hypoglycemia.        Endocrine  Type 2 diabetes mellitus without complication, without long-term current use of insulin  BG goal 140-180. Diet advanced. Still on TF @ 45 ml/hr. Excursions noted with additional intake.      - Novolog 6 units during 0800, 1200, and 1600 BG checks to cover additional intake. -hold if TF held or BG < 100   - Novolog 4 units 2000, 0000, 0400 to cover TF- hold if TF held or BG < 100   - LDC (150/50) prn q4hr   - POCT Glucose every 4 hours  - Hypoglycemia protocol in place      ** Please notify Endocrine for any change and/or advance in diet**  ** Please call Endocrine for any BG related issues **     Discharge Planning:   TBD. Please notify endocrinology prior to discharge.                 Ebony Carlson PA-C  Endocrinology  [facility]

## 2024-01-05 NOTE — PT/OT/SLP PROGRESS
Occupational Therapy      Patient Name:  Radha Abbott   MRN:  [id number]    Patient not seen today secondary to Patient unwilling to participate (Pt refused this morning 2/2 8/10 lip and nose pain. Writing OT offered to return in PM however pt declined session today.). Will follow-up per POC.    Kristen Grijalva OT  1/5/2024

## 2024-01-05 NOTE — PT/OT/SLP PROGRESS
Speech Language Pathology Treatment    Patient Name:  Radha Abbott   MRN:  [id number]  Admitting Diagnosis: LVAD (left ventricular assist device) present    Recommendations:                 General Recommendations:  Dysphagia therapy  Diet recommendations:  Minced & Moist Diet - IDDSI Level 5, Liquid Diet Level: Moderately thick liquids - IDDSI Level 3  All liquids MUST be a honey thick consistency - Pt is a silent aspirator.   Pre-thickened liquids level 3 with yellow triangle - MODERATELY THICK  Can also thicken using thickener packets, 1 full pack to 4 oz of fluid   Can continue with ice chips only when completing dysphagia exercises  Encourage double swallows between bites and sips to clear residue     Aspiration Precautions: Strict aspiration precautions   General Precautions: Standard, fall  Communication strategies:  none    Assessment:     Radha Abbott is a 63 y.o. male with an SLP diagnosis of Dysphagia.  Subjective     Pt awake and alert upon arrival     Pain/Comfort:  Pain Rating 1: 0/10  Pain Rating Post-Intervention 1: 0/10    Respiratory Status: Room air    Objective:     Has the patient been evaluated by SLP for swallowing?   Yes  Keep patient NPO? No   Current Respiratory Status:   Room air      Pt seen for ongoing dysphagia tx. Pt remains with NG tube in place. Pt vocal quality intermittently strong and clear this date. Pt sleeping upon arrival and complaining of mouth/throat pain since NG tube replacement. Pt disinterested in PO intake. Sister at the bedside and reporting no honey thickened liquids being delivered with meal time tray. SLP checked and corrected orders. SLP removed thin liquids on tray. SLP ensured whiteboard updated. SLP offered extensive education to family member re: diet recs and swallow dysfunction. Pt sister reporting niece is also and SLP and SLP discussed would be happy to discuss deficits at length with family member If questions arise .  SLP reviewed at length all precautions  including importance of executing second swallows throughout meals to assist with clearing residue SLP discussed with pt at length how to thicken liquids to safest consistency as well as which pre-thickened liquids meet honey thick recommendations.  Diet recommendations accurate on whiteboard in room. Speech to continue to closely follow.        Goals:   Multidisciplinary Problems       SLP Goals          Problem: SLP    Goal Priority Disciplines Outcome   SLP Goal     SLP Ongoing, Progressing   Description: Speech Language Pathology Goals  Goals expected to be met by 12/24    1. Pt will participate in ongoing assessment of swallow function to help determine least restrictive diet                            Plan:     Patient to be seen:  4 x/week   Plan of Care reviewed with:  patient   SLP Follow-Up:  Yes       Discharge recommendations:  High Intensity Therapy   Barriers to Discharge:  None    Time Tracking:     SLP Treatment Date:   01/05/24  Speech Start Time:  1320  Speech Stop Time:  1332     Speech Total Time (min):  12 min    Billable Minutes: Treatment Swallowing Dysfunction 12 01/05/2024

## 2024-01-05 NOTE — SUBJECTIVE & OBJECTIVE
Interval History:   HD completed yesterday with net UF of 2.1L.  Tolerated well.  No complaints on exam this morning, UOP of 350 ml recorded over the past 24 hours.    Review of patient's allergies indicates:  No Known Allergies  Current Facility-Administered Medications   Medication Frequency    0.9%  NaCl infusion Continuous    acetaminophen tablet 1,000 mg TID    amiodarone tablet 400 mg Daily    balsam peru-castor oiL Oint BID    bisacodyL suppository 10 mg Daily PRN    dextrose 10 % infusion Continuous PRN    dextrose 10% bolus 125 mL 125 mL PRN    dextrose 10% bolus 125 mL 125 mL PRN    dextrose 10% bolus 250 mL 250 mL PRN    dextrose 10% bolus 250 mL 250 mL PRN    epoetin zohaib injection 8,100 Units Every Tues, Thurs, Sat    famotidine tablet 20 mg Daily    gabapentin 250 mg/5 mL (5 mL) solution 125 mg Q12H    glucagon (human recombinant) injection 1 mg PRN    heparin (porcine) injection 1,000 Units PRN    hydrALAZINE injection 10 mg Q6H PRN    insulin aspart U-100 pen 0-5 Units Q4H PRN    insulin aspart U-100 pen 4 Units Q24H    insulin aspart U-100 pen 4 Units Q24H    insulin aspart U-100 pen 4 Units Q24H    insulin aspart U-100 pen 6 Units Q24H    insulin aspart U-100 pen 6 Units Q24H    insulin aspart U-100 pen 6 Units Q24H    ondansetron injection 4 mg Q6H PRN    polyethylene glycol packet 17 g Daily    psyllium husk (aspartame) 3.4 gram packet 1 packet Daily    senna-docusate 8.6-50 mg per tablet 2 tablet Daily    sodium chloride 0.9% flush 10 mL PRN    traMADoL tablet 50 mg Q8H PRN    traMADoL tablet 50 mg Once    traZODone tablet 50 mg QHS    venlafaxine tablet 37.5 mg Daily    warfarin (COUMADIN) tablet 2.5 mg Every Mon, Wed, Fri    warfarin (COUMADIN) tablet 5 mg Every Tues, Thurs, Sat, Sun    zolpidem tablet 5 mg Nightly PRN       Objective:     Vital Signs (Most Recent):  Temp: 97.5 °F (36.4 °C) (01/05/24 1113)  Pulse: (!) 50 (01/05/24 1113)  Resp: 18 (01/05/24 1113)  BP: 111/70 (01/05/24  1113)  SpO2: 99 % (01/05/24 1113) Vital Signs (24h Range):  Temp:  [97.3 °F (36.3 °C)-98.3 °F (36.8 °C)] 97.5 °F (36.4 °C)  Pulse:  [50-99] 50  Resp:  [16-19] 18  SpO2:  [93 %-100 %] 99 %  BP: ()/(0-71) 111/70     Weight: 72.6 kg (160 lb) (01/05/24 0814)  Body mass index is 21.7 kg/m².  Body surface area is 1.92 meters squared.    I/O last 3 completed shifts:  In: 981 [P.O.:381; NG/GT:600]  Out: 2625 [Urine:525; Other:2100]     Physical Exam  Vitals and nursing note reviewed.   HENT:      Head: Normocephalic and atraumatic.      Mouth/Throat:      Mouth: Mucous membranes are dry.   Eyes:      Conjunctiva/sclera: Conjunctivae normal.   Cardiovascular:      Comments: LVAD   Pulmonary:      Effort: Pulmonary effort is normal. No respiratory distress.      Breath sounds: No wheezing or rales.      Comments: RA  Abdominal:      Palpations: Abdomen is soft.   Musculoskeletal:         General: No swelling.      Right lower leg: No edema.      Left lower leg: No edema.   Skin:     General: Skin is warm.   Neurological:      Mental Status: He is alert and oriented to person, place, and time.   Psychiatric:         Mood and Affect: Mood normal.         Behavior: Behavior normal.          Significant Labs:  CBC:   Recent Labs   Lab 01/05/24  1059   WBC 7.21   RBC 2.70*   HGB 7.6*   HCT 24.9*   *   MCV 92   MCH 28.1   MCHC 30.5*     CMP:   Recent Labs   Lab 01/05/24  1059   *   CALCIUM 9.1   ALBUMIN 2.2*   PROT 8.0      K 4.3   CO2 23      BUN 46*   CREATININE 3.6*   ALKPHOS 181*   ALT 41   AST 68*   BILITOT 0.4

## 2024-01-05 NOTE — SUBJECTIVE & OBJECTIVE
Interval History:Pt started complaining of right sided tonsillar/neck pain. On exam, appears to have swollen lymph node. Respiratory panel yesterday negative. Throat culture and strep pending. Of note, patient's wife went home yesterday and is being treated for flu empirically (unknown whether confirmed with testing). Nephrology recommending diuretic challenge with 160 mg IVP lasix.     Continuous Infusions:   sodium chloride 0.9%      dextrose 10 % in water (D10W)       Scheduled Meds:   acetaminophen  1,000 mg Per NG tube TID    amiodarone  400 mg Per NG tube Daily    balsam peru-castor oiL   Topical (Top) BID    epoetin zohaib (PROCRIT) injection  50 Units/kg Subcutaneous Every Tues, Thurs, Sat    famotidine  20 mg Per G Tube Daily    gabapentin  125 mg Per NG tube Q12H    insulin aspart U-100  4 Units Subcutaneous Q24H    insulin aspart U-100  4 Units Subcutaneous Q24H    insulin aspart U-100  4 Units Subcutaneous Q24H    insulin aspart U-100  6 Units Subcutaneous Q24H    insulin aspart U-100  6 Units Subcutaneous Q24H    insulin aspart U-100  6 Units Subcutaneous Q24H    polyethylene glycol  17 g Per NG tube Daily    psyllium husk (aspartame)  1 packet Per NG tube Daily    senna-docusate 8.6-50 mg  2 tablet Per NG tube Daily    traMADoL  50 mg Oral Once    traZODone  50 mg Per NG tube QHS    venlafaxine  37.5 mg Per G Tube Daily    warfarin  2.5 mg Per G Tube Every Mon, Wed, Fri    warfarin  5 mg Per G Tube Every Tues, Thurs, Sat, Sun     PRN Meds:bisacodyL, dextrose 10 % in water (D10W), dextrose 10%, dextrose 10%, dextrose 10%, dextrose 10%, glucagon (human recombinant), heparin (porcine), hydrALAZINE, insulin aspart U-100, ondansetron, Flushing PICC/Midline Protocol **AND** [DISCONTINUED] sodium chloride 0.9% **AND** sodium chloride 0.9%, traMADoL, zolpidem    Review of patient's allergies indicates:  No Known Allergies  Objective:     Vital Signs (Most Recent):  Temp: 97.8 °F (36.6 °C) (01/05/24 0744)  Pulse:  72 (01/05/24 0744)  Resp: 18 (01/05/24 0744)  BP: (!) 80/0 (01/05/24 0814)  SpO2: 100 % (01/05/24 0744) Vital Signs (24h Range):  Temp:  [97.3 °F (36.3 °C)-98.3 °F (36.8 °C)] 97.8 °F (36.6 °C)  Pulse:  [] 72  Resp:  [16-19] 18  SpO2:  [93 %-100 %] 100 %  BP: (72-97)/(0-71) 80/0     Patient Vitals for the past 72 hrs (Last 3 readings):   Weight   01/05/24 0814 72.6 kg (160 lb)   01/04/24 0746 73.5 kg (162 lb)   01/02/24 1440 82.1 kg (181 lb)       Body mass index is 21.7 kg/m².      Intake/Output Summary (Last 24 hours) at 1/5/2024 0936  Last data filed at 1/5/2024 0621  Gross per 24 hour   Intake 231 ml   Output 2450 ml   Net -2219 ml         Hemodynamic Parameters:       Telemetry: SR       Physical Exam  Constitutional:       Comments: Cachectic, temporal wasting   HENT:      Head: Normocephalic and atraumatic.   Eyes:      Conjunctiva/sclera: Conjunctivae normal.      Pupils: Pupils are equal, round, and reactive to light.   Neck:      Comments: Do not appreciate elevated JVP  Cardiovascular:      Rate and Rhythm: Normal rate and regular rhythm.      Comments: Smooth VAD hum  Pulmonary:      Effort: Pulmonary effort is normal.      Breath sounds: Normal breath sounds.   Abdominal:      General: Bowel sounds are normal.      Palpations: Abdomen is soft.   Musculoskeletal:         General: No swelling. Normal range of motion.      Cervical back: Normal range of motion and neck supple.   Skin:     General: Skin is warm and dry.      Capillary Refill: Capillary refill takes 2 to 3 seconds.   Neurological:      General: No focal deficit present.      Mental Status: He is alert and oriented to person, place, and time.            Significant Labs:  CBC:  Recent Labs   Lab 01/02/24  0431 01/03/24  0500 01/04/24  0453   WBC 7.39 6.27 6.99   RBC 2.41* 2.43* 2.29*   HGB 7.0* 6.9* 6.5*   HCT 22.4* 22.4* 19.8*    441 397   MCV 93 92 87   MCH 29.0 28.4 28.4   MCHC 31.3* 30.8* 32.8       BNP:  Recent Labs   Lab  01/01/24  0412 01/03/24  0500   * 600*       CMP:  Recent Labs   Lab 01/02/24  0431 01/03/24  0500 01/04/24  0453   * 143* 162*   CALCIUM 8.6* 8.5* 8.5*   ALBUMIN 1.9* 1.9* 1.9*   PROT 6.8 6.9 6.9   * 136 134*   K 4.1 4.3 4.5   CO2 26 24 23   CL 97 100 100   BUN 78* 50* 74*   CREATININE 5.2* 3.5* 4.4*   ALKPHOS 175* 176* 181*   ALT 18 22 29   AST 36 43* 52*   BILITOT 0.3 0.3 0.3        Coagulation:   Recent Labs   Lab 01/02/24  0431 01/03/24  0500 01/04/24  0453   INR 2.2* 2.8* 2.1*       LDH:  Recent Labs   Lab 01/03/24  0500 01/04/24  0453   * 389*       Microbiology:  Microbiology Results (last 7 days)       Procedure Component Value Units Date/Time    Group A Strep, Molecular [[id number]]     Order Status: No result Specimen: Throat     Throat culture [[id number]] Collected: 01/04/24 1942    Order Status: Completed Specimen: Throat Updated: 01/05/24 0922     RESPIRATORY CULTURE - THROAT Insufficient incubation, culture in progress    Respiratory Infection Panel (PCR), Nasopharyngeal [[id number]] Collected: 01/04/24 1942    Order Status: Completed Specimen: Nasopharyngeal Swab Updated: 01/04/24 2101     Respiratory Infection Panel Source NP Swab     Adenovirus Not Detected     Coronavirus 229E, Common Cold Virus Not Detected     Coronavirus HKU1, Common Cold Virus Not Detected     Coronavirus NL63, Common Cold Virus Not Detected     Coronavirus OC43, Common Cold Virus Not Detected     Comment: The Coronavirus strains detected in this test cause the common cold.  These strains are not the COVID-19 (novel Coronavirus)strain   associated with the respiratory disease outbreak.          SARS-CoV2 (COVID-19) Qualitative PCR Not Detected     Human Metapneumovirus Not Detected     Human Rhinovirus/Enterovirus Not Detected     Influenza A (subtypes H1, H1-2009,H3) Not Detected     Influenza B Not Detected     Parainfluenza Virus 1 Not Detected     Parainfluenza Virus 2 Not Detected      Parainfluenza Virus 3 Not Detected     Parainfluenza Virus 4 Not Detected     Respiratory Syncytial Virus Not Detected     Bordetella Parapertussis (FR7600) Not Detected     Bordetella pertussis (ptxP) Not Detected     Chlamydia pneumoniae Not Detected     Mycoplasma pneumoniae Not Detected    Narrative:      For all other respiratory sources, order [id number] -  Respiratory Viral Panel by PCR            I have reviewed all pertinent labs within the past 24 hours.    Estimated Creatinine Clearance: 18.1 mL/min (A) (based on SCr of 4.4 mg/dL (H)).    Diagnostic Results:  I have reviewed and interpreted all pertinent imaging results/findings within the past 24 hours.

## 2024-01-05 NOTE — PROGRESS NOTES
[facility] - Cardiology Stepdown  Infectious Disease  Progress Note    Patient Name: Radha Abbott  MRN: [id number]  Admission Date: 11/9/2023  Length of Stay: 57 days  Attending Physician: Brayan Ocampo MD  Primary Care Provider: Vasu Kong MD    Isolation Status: No active isolations  Assessment/Plan:      Other  Lymphadenopathy  Radha Abbott is a 63 year old man admitted with heart failure now s/p HM3 on 12/1/23 complicated by coagulopathy and delayed mediastinal closure until 12/4. Has required treatment for possible VAP until 12/14. ID called back for right sided cervical lymphadenopathy and odynophagia. He has a recent sick contact, wife has influenza. Appears to be along his cervical lymph node chain, but would want to ensure we are not missing a phlebitis. Rapid strep negative, culture still pending. Upper respiratory panel negative.   - would obtain CT neck soft tissue if stable, otherwise can obtain ultrasound soft tissue and vascular   - he is flu negative, but can start prophylaxis with tamiflu given his recent exposure, if he remains off dialysis then dosing would be 30 mg every other day for 1 week  - if CT neck shows any sign of infectious process, would start empiric pip-tazo and consider ENT consult         Above discussed with primary team.     Time: 35 minutes   50% of time spent on face-to-face counseling and coordination of care. Counseling included review of test results, diagnosis, and treatment plan with patient and/or family.  I have reviewed hospital notes from VAD service and other specialty providers. I have also reviewed CBC, CMP/BMP,  cultures and imaging with my interpretation as documented.     Anticipated Disposition: TBD    Thank you for your consult. I will follow-up with patient. Please contact us if you have any additional questions.    Xin Brand MD  Infectious Disease  [facility] - Cardiology Stepdown    Subjective:     Principal Problem:LVAD (left ventricular  assist device) present    HPI:  Mr. Abbott is a 63-year-old man who underwent destination therapy left ventricular assist device placement.  Postoperatively the chest was left open due to diffuse coagulopathy in RV dysfunction.  He did well post-operatively and underwent washout and closure on 12/4. The patient was extubated but had to be reintubated. A BAL was performed: Gram stain with GNB, culture is NGTD. ID is consulted for BAL results. The patient is agitated but quiets down with reassurance. No fever or chills. Ecgo done this am - results pending. Family at bedside.     Interval History: ID called back for right cervical swelling. He was initially admitted 11/9 for heart failure decompensation, had balloon pump placed 11/21 then subsequently DT LVAD on 12/1. Chest wall left over, course complicated by coagulopathy and RV dysfunction, renal failure secondary to ATN. He was intubated for respiratory failure, BAL culture no growth but treated empirically for VAP with meropenem and vancomycin until 12/14. His wife was recently diagnosed with flu, was at bedside until yesterday AM. He reports throat pain with swallowing as well as neck pain. He has some rhinorrhea and productive cough. Never had anything like this before. Denies fevers or chills.     Review of Systems   Constitutional:  Positive for activity change, appetite change and fatigue. Negative for chills and fever.   HENT:  Positive for congestion, dental problem (multiple teeth pulled), facial swelling, rhinorrhea, sore throat and trouble swallowing. Negative for hearing loss, mouth sores, postnasal drip, sinus pressure, sinus pain and sneezing.    Eyes: Negative.    Respiratory:  Positive for cough.    Skin:  Negative for wound.   All other systems reviewed and are negative.    Objective:     Vital Signs (Most Recent):  Temp: 97.5 °F (36.4 °C) (01/05/24 1113)  Pulse: 87 (01/05/24 1400)  Resp: 18 (01/05/24 1113)  BP: 111/70 (01/05/24 1113)  SpO2: 99 %  (01/05/24 1113) Vital Signs (24h Range):  Temp:  [97.3 °F (36.3 °C)-98.3 °F (36.8 °C)] 97.5 °F (36.4 °C)  Pulse:  [68-99] 87  Resp:  [16-19] 18  SpO2:  [94 %-100 %] 99 %  BP: ()/(0-70) 111/70     Weight: 72.6 kg (160 lb)  Body mass index is 21.7 kg/m².    Estimated Creatinine Clearance: 22.1 mL/min (A) (based on SCr of 3.6 mg/dL (H)).     Physical Exam  Vitals and nursing note reviewed.   Constitutional:       Appearance: He is ill-appearing.   HENT:      Right Ear: External ear normal.      Left Ear: External ear normal.      Nose:      Comments: NG tube     Mouth/Throat:      Mouth: Mucous membranes are moist.      Pharynx: Oropharynx is clear. No oropharyngeal exudate or posterior oropharyngeal erythema.      Comments: Poor dentition  Eyes:      General:         Right eye: No discharge.         Left eye: No discharge.      Conjunctiva/sclera: Conjunctivae normal.   Pulmonary:      Effort: Pulmonary effort is normal.   Abdominal:      General: There is no distension.      Palpations: Abdomen is soft.   Musculoskeletal:         General: Normal range of motion.      Cervical back: Normal range of motion. Tenderness present.   Lymphadenopathy:      Cervical: Cervical adenopathy (right sided, tender to palpation) present.   Skin:     General: Skin is warm and dry.   Neurological:      General: No focal deficit present.      Mental Status: He is alert and oriented to person, place, and time.   Psychiatric:         Behavior: Behavior normal.          Significant Labs:   Microbiology Results (last 7 days)       Procedure Component Value Units Date/Time    Influenza A & B by Molecular [[id number]]     Order Status: No result Specimen: Nasopharyngeal Swab     Group A Strep, Molecular [[id number]] Collected: 01/05/24 1004    Order Status: Completed Specimen: Throat Updated: 01/05/24 1039     Group A Strep, Molecular Negative     Comment: Arcanobacterium haemolyticum and Beta Streptococcus group C   and G will not be  detected by this test method.  Please order   Throat Culture ([id number]) if suspected.         Throat culture [[id number]] Collected: 01/04/24 1942    Order Status: Completed Specimen: Throat Updated: 01/05/24 0922     RESPIRATORY CULTURE - THROAT Insufficient incubation, culture in progress    Respiratory Infection Panel (PCR), Nasopharyngeal [[id number]] Collected: 01/04/24 1942    Order Status: Completed Specimen: Nasopharyngeal Swab Updated: 01/04/24 2101     Respiratory Infection Panel Source NP Swab     Adenovirus Not Detected     Coronavirus 229E, Common Cold Virus Not Detected     Coronavirus HKU1, Common Cold Virus Not Detected     Coronavirus NL63, Common Cold Virus Not Detected     Coronavirus OC43, Common Cold Virus Not Detected     Comment: The Coronavirus strains detected in this test cause the common cold.  These strains are not the COVID-19 (novel Coronavirus)strain   associated with the respiratory disease outbreak.          SARS-CoV2 (COVID-19) Qualitative PCR Not Detected     Human Metapneumovirus Not Detected     Human Rhinovirus/Enterovirus Not Detected     Influenza A (subtypes H1, H1-2009,H3) Not Detected     Influenza B Not Detected     Parainfluenza Virus 1 Not Detected     Parainfluenza Virus 2 Not Detected     Parainfluenza Virus 3 Not Detected     Parainfluenza Virus 4 Not Detected     Respiratory Syncytial Virus Not Detected     Bordetella Parapertussis ([id number]) Not Detected     Bordetella pertussis (ptxP) Not Detected     Chlamydia pneumoniae Not Detected     Mycoplasma pneumoniae Not Detected    Narrative:      For all other respiratory sources, order [id number] -  Respiratory Viral Panel by PCR            Significant Imaging: I have reviewed all pertinent imaging results/findings within the past 24 hours.

## 2024-01-05 NOTE — ASSESSMENT & PLAN NOTE
- suspect acute tubular injury secondary to hypotension/cardiogenic shock likely compounded by intra-arterial contrast and anemia with urinary sediment with granular casts    -No need for further RRT today  -Lasix 160 mg IV x 1.   -Hgb 7.6.  Started EPO with HD  -recommend starting on IV iron, recommend Feraheme 510 mg IV   -Continue bladder scans q shift  -strict I/O's  -renal diet/tube feeds when not NPO, volume restriction per primary team  -renally all dose medications to eGFR  -avoid nephrotoxic agents wean feasible (i.e. NSAIDs, intra-arterial contrast, supra-therapeutic vancomycin levels, etc.)

## 2024-01-05 NOTE — PROGRESS NOTES
[facility] - Cardiology Step[facility]  Heart Transplant  Progress Note    Patient Name: Radha Abbott  MRN: [id number]  Admission Date: 11/9/2023  Hospital Length of Stay: 57 days  Attending Physician: Brayan Ocampo MD  Primary Care Provider: Vasu Kong MD  Principal Problem:LVAD (left ventricular assist device) present    Subjective:   Interval History:Pt started complaining of right sided tonsillar/neck pain. On exam, appears to have swollen lymph node. Respiratory panel yesterday negative. Throat culture and strep pending. Of note, patient's wife went home yesterday and is being treated for flu empirically (unknown whether confirmed with testing). Nephrology recommending diuretic challenge with 160 mg IVP lasix.     Continuous Infusions:   sodium chloride 0.9%      dextrose 10 % in water (D10W)       Scheduled Meds:   acetaminophen  1,000 mg Per NG tube TID    amiodarone  400 mg Per NG tube Daily    balsam peru-castor oiL   Topical (Top) BID    epoetin zohaib (PROCRIT) injection  50 Units/kg Subcutaneous Every Tues, Thurs, Sat    famotidine  20 mg Per G Tube Daily    gabapentin  125 mg Per NG tube Q12H    insulin aspart U-100  4 Units Subcutaneous Q24H    insulin aspart U-100  4 Units Subcutaneous Q24H    insulin aspart U-100  4 Units Subcutaneous Q24H    insulin aspart U-100  6 Units Subcutaneous Q24H    insulin aspart U-100  6 Units Subcutaneous Q24H    insulin aspart U-100  6 Units Subcutaneous Q24H    polyethylene glycol  17 g Per NG tube Daily    psyllium husk (aspartame)  1 packet Per NG tube Daily    senna-docusate 8.6-50 mg  2 tablet Per NG tube Daily    traMADoL  50 mg Oral Once    traZODone  50 mg Per NG tube QHS    venlafaxine  37.5 mg Per G Tube Daily    warfarin  2.5 mg Per G Tube Every Mon, Wed, Fri    warfarin  5 mg Per G Tube Every Tues, Thurs, Sat, Sun     PRN Meds:bisacodyL, dextrose 10 % in water (D10W), dextrose 10%, dextrose 10%, dextrose 10%, dextrose 10%, glucagon (human recombinant),  heparin (porcine), hydrALAZINE, insulin aspart U-100, ondansetron, Flushing PICC/Midline Protocol **AND** [DISCONTINUED] sodium chloride 0.9% **AND** sodium chloride 0.9%, traMADoL, zolpidem    Review of patient's allergies indicates:  No Known Allergies  Objective:     Vital Signs (Most Recent):  Temp: 97.8 °F (36.6 °C) (01/05/24 0744)  Pulse: 72 (01/05/24 0744)  Resp: 18 (01/05/24 0744)  BP: (!) 80/0 (01/05/24 0814)  SpO2: 100 % (01/05/24 0744) Vital Signs (24h Range):  Temp:  [97.3 °F (36.3 °C)-98.3 °F (36.8 °C)] 97.8 °F (36.6 °C)  Pulse:  [] 72  Resp:  [16-19] 18  SpO2:  [93 %-100 %] 100 %  BP: (72-97)/(0-71) 80/0     Patient Vitals for the past 72 hrs (Last 3 readings):   Weight   01/05/24 0814 72.6 kg (160 lb)   01/04/24 0746 73.5 kg (162 lb)   01/02/24 1440 82.1 kg (181 lb)       Body mass index is 21.7 kg/m².      Intake/Output Summary (Last 24 hours) at 1/5/2024 0936  Last data filed at 1/5/2024 0621  Gross per 24 hour   Intake 231 ml   Output 2450 ml   Net -2219 ml         Hemodynamic Parameters:       Telemetry: SR       Physical Exam  Constitutional:       Comments: Cachectic, temporal wasting   HENT:      Head: Normocephalic and atraumatic.   Eyes:      Conjunctiva/sclera: Conjunctivae normal.      Pupils: Pupils are equal, round, and reactive to light.   Neck:      Comments: Do not appreciate elevated JVP  Cardiovascular:      Rate and Rhythm: Normal rate and regular rhythm.      Comments: Smooth VAD hum  Pulmonary:      Effort: Pulmonary effort is normal.      Breath sounds: Normal breath sounds.   Abdominal:      General: Bowel sounds are normal.      Palpations: Abdomen is soft.   Musculoskeletal:         General: No swelling. Normal range of motion.      Cervical back: Normal range of motion and neck supple.   Skin:     General: Skin is warm and dry.      Capillary Refill: Capillary refill takes 2 to 3 seconds.   Neurological:      General: No focal deficit present.      Mental Status: He is  alert and oriented to person, place, and time.            Significant Labs:  CBC:  Recent Labs   Lab 01/02/24  0431 01/03/24  0500 01/04/24  0453   WBC 7.39 6.27 6.99   RBC 2.41* 2.43* 2.29*   HGB 7.0* 6.9* 6.5*   HCT 22.4* 22.4* 19.8*    441 397   MCV 93 92 87   MCH 29.0 28.4 28.4   MCHC 31.3* 30.8* 32.8       BNP:  Recent Labs   Lab 01/01/24  0412 01/03/24  0500   * 600*       CMP:  Recent Labs   Lab 01/02/24  0431 01/03/24  0500 01/04/24  0453   * 143* 162*   CALCIUM 8.6* 8.5* 8.5*   ALBUMIN 1.9* 1.9* 1.9*   PROT 6.8 6.9 6.9   * 136 134*   K 4.1 4.3 4.5   CO2 26 24 23   CL 97 100 100   BUN 78* 50* 74*   CREATININE 5.2* 3.5* 4.4*   ALKPHOS 175* 176* 181*   ALT 18 22 29   AST 36 43* 52*   BILITOT 0.3 0.3 0.3        Coagulation:   Recent Labs   Lab 01/02/24  0431 01/03/24  0500 01/04/24  0453   INR 2.2* 2.8* 2.1*       LDH:  Recent Labs   Lab 01/03/24  0500 01/04/24  0453   * 389*       Microbiology:  Microbiology Results (last 7 days)       Procedure Component Value Units Date/Time    Group A Strep, Molecular [[id number]]     Order Status: No result Specimen: Throat     Throat culture [[id number]] Collected: 01/04/24 1942    Order Status: Completed Specimen: Throat Updated: 01/05/24 0922     RESPIRATORY CULTURE - THROAT Insufficient incubation, culture in progress    Respiratory Infection Panel (PCR), Nasopharyngeal [[id number]] Collected: 01/04/24 1942    Order Status: Completed Specimen: Nasopharyngeal Swab Updated: 01/04/24 2101     Respiratory Infection Panel Source NP Swab     Adenovirus Not Detected     Coronavirus 229E, Common Cold Virus Not Detected     Coronavirus HKU1, Common Cold Virus Not Detected     Coronavirus NL63, Common Cold Virus Not Detected     Coronavirus OC43, Common Cold Virus Not Detected     Comment: The Coronavirus strains detected in this test cause the common cold.  These strains are not the COVID-19 (novel Coronavirus)strain   associated with the  respiratory disease outbreak.          SARS-CoV2 (COVID-19) Qualitative PCR Not Detected     Human Metapneumovirus Not Detected     Human Rhinovirus/Enterovirus Not Detected     Influenza A (subtypes H1, H1-2009,H3) Not Detected     Influenza B Not Detected     Parainfluenza Virus 1 Not Detected     Parainfluenza Virus 2 Not Detected     Parainfluenza Virus 3 Not Detected     Parainfluenza Virus 4 Not Detected     Respiratory Syncytial Virus Not Detected     Bordetella Parapertussis (UG1830) Not Detected     Bordetella pertussis (ptxP) Not Detected     Chlamydia pneumoniae Not Detected     Mycoplasma pneumoniae Not Detected    Narrative:      For all other respiratory sources, order [id number] -  Respiratory Viral Panel by PCR            I have reviewed all pertinent labs within the past 24 hours.    Estimated Creatinine Clearance: 18.1 mL/min (A) (based on SCr of 4.4 mg/dL (H)).    Diagnostic Results:  I have reviewed and interpreted all pertinent imaging results/findings within the past 24 hours.  Assessment and Plan:     63 year old male with hx of CAD s/p 3v CABG (unclear anatomy, 2009), ICM with a recent EF of 15-20% s/p ICD (medtronik 2009), DM2 (a1c 7.7), HTN, HLD, Vfib on amio who presents to the ED with CC of SOB     Pt was recently admitted to [facility] as a transfer.  He was started on a Lasix gtt and did well.  Started on gDMT and discharged home on  5 with plans to follow up in HTS clinic for ongoing transplant evaluation at another facility.  He says that about a few days ago he started to notice LE swelling.  This turned into Nelson and orthopnea.  He says he can't walk to the bathroom without getting SOB.  He also complains of weight gain.  He takes torsemide 20mg BID at home and was told to trial additional Lasix which he did without any improvement.  He was rx metolazone but has not been filled.  He came to the ED     In the ED he was AF with stable VS on RA.  CBC showing chronic anemia.  CMP notable for  acute on chronic CKD with baseline around 2.1 and Cr now 2.6.  BNP elevated.  Lactate neg.  CXR showing pulmonary edema.  I evaluated the pt at the bedside.  Bedside TTE showing CVP >15.  He was subsequently admitted to the CCU for diuresis.     He was continued on his home  and was started on a lasix gtt, which he responded well to overnight (net -1700cc. He feels much better this morning as well. Our transplant coordinators have been working on insurance approval and he is now being transferred to [facility] service for transplant evaluation.     * LVAD (left ventricular assist device) present  -HeartMate 3 Implanted  12/1/2023  as DT  -HTS Primary  -Implanted by Dr. Washington  -Continue Coumadin, dosing by PharmD.  Goal INR 2.0-3.0 . Therapeutic today.   -Antiplatelets Not on ASA  -LDH is stable overall today. Will continue to monitor daily.  -Weaned off midodrine.  -Speed set at  4900   rpm, LSL 4500 rpm   -Interrogation notable for no events  -ECHO 1/2/24 mild TR, LVEDD 6.25 with HM3 set to 4900 rpm   -Not listed for OHTx, declined for OHTX due to comorbidities   -PT/OT/Speech. Recommending rehab but HD+LVAD may be barrier.       Procedure: Device Interrogation Including analysis of device parameters  Current Settings: Ventricular Assist Device  Review of device function is stable/unstable stable        1/5/2024     8:00 AM 1/5/2024    12:00 AM 1/4/2024     8:00 PM 1/4/2024     4:00 PM 1/4/2024    12:00 PM 1/4/2024     7:46 AM 1/4/2024     4:33 AM   TXP LVAD INTERROGATIONS   Type HeartMate3  HeartMate3 HeartMate3 HeartMate3 HeartMate3 HeartMate3   Flow 3.9 4.1 4 4 3.6 3.8 3.8   Speed 4900 5900 4900 4900 4900 4950 4950   PI 5.4 1.3 4.5 4.1 4.6 4.8 4.6   Power ([place]) 3.4 3.3 3.3 3.3 3.2 3.2 3.3   LSL 4500  4500 4500 4500 4500    Low Flow Alarm no no no no no no    High Power Alarm no no no no no no    Pulsatility Intermittent pulse Intermittent pulse Intermittent pulse Intermittent pulse Intermittent pulse Intermittent  pulse Intermittent pulse         Lymphadenopathy  +ill contacts  Respiratory panel negative  Strep test pending      Unilateral complete vocal fold paralysis  -Appreciate ENT's help  -S/P augmentation of paralyzed left vocal cord 12/18    Dysphonia  -Speech following closely  -Appreciate ENT's help. S/P augmentation of paralyzed left vocal cord 12/18    Moderate malnutrition  -RD and SLP following  -Tolerating tube feeds. Total daily caloric intake increased to 2160  -Failed MBBS 12/20, continue TF.  -Speech following. Plan to repeat swallow study today     Depressed mood  -Psychiatry consulted for depressed mood, SI when left alone  -Recommend sitter when alone (possibly with transition to stepdown).  -Psych reconsulted 1/2, agree with venlafaxine and trazadone    IVÁN (acute kidney injury)  -Nephrology following      Acute on chronic combined systolic and diastolic CHF, NYHA class 4  Pt with known ICM with an EF of 25% on home  presented with decompensated HF.    -S/P LVAD    PAF (paroxysmal atrial fibrillation)  Known hx of pAF. In sinus rhythm on admission, but 1 run of AF RVR overnight. He spontaneously converted.  - Continue home amiodarone 400mg qd  - Continue AC      Acute renal failure with acute tubular necrosis superimposed on stage 3b chronic kidney disease  IVÁN on CKD2. Baseline Cr of 1.7-2.0.   - Hold ARNi  -Trend BMPs daily   -Nephrology following   -SLED/SCUF for volume removal began on 12/1. He is anuric  -Did not respond to diuretic challenge with 240 mg IV Lasix, 1000 mg Diuril, and 500 mg IV Diamox done 12/14  -Now on HD TThSat  -Tunneled trialysis line placed 12/22 by IR  -Neph recommending diuretic challenge with 160 mg IVP Lasix     Type 2 diabetes mellitus without complication, without long-term current use of insulin  -A1c 7.6, not insulin dependent  - Endocrine following, appreciate assistance with blood glucose management    CAD (coronary artery disease)  -S/p 3vCABG in 2009  -Lipitor on  hold 2/2 mild transaminitis  -Not on ASA post VAD    Ventricular tachycardia  Hx VT s/p ICD placement (medtronic 2009)  - Continue home amio 400mg qd        Vero Lozada PA-C  Heart Transplant  [facility] - Cardiology Stepdown

## 2024-01-05 NOTE — NURSING
Pt tolerated morning meds crushed with pudding.   X- ray results pending to verify NG tube placement.

## 2024-01-05 NOTE — ASSESSMENT & PLAN NOTE
Radha Abbott is a 63 year old man admitted with heart failure now s/p HM3 on 12/1/23 complicated by coagulopathy and delayed mediastinal closure until 12/4. Has required treatment for possible VAP until 12/14. ID called back for right sided cervical lymphadenopathy and odynophagia. He has a recent sick contact, wife has influenza. Appears to be along his cervical lymph node chain, but would want to ensure we are not missing a phlebitis. Rapid strep negative, culture still pending. Upper respiratory panel negative.   - would obtain CT neck soft tissue if stable, otherwise can obtain ultrasound soft tissue and vascular   - he is flu negative, but can start prophylaxis with tamiflu given his recent exposure, if he remains off dialysis then dosing would be 30 mg every other day for 1 week  - if CT neck shows any sign of infectious process, would start empiric pip-tazo and consider ENT consult

## 2024-01-05 NOTE — SUBJECTIVE & OBJECTIVE
"Interval HPI:   No acute events overnight. Patient in room 312/312 A. Blood glucose stable. BG at goal on current insulin regimen (Schedule Insulin and SSI (TPN/TF)). Steroid use- None.   32 Days Post-Op  Renal function- Abnormal - 4.4 cr    Vasopressors-  None     Diet Minced & Moist (IDDSI Level 5)     Eating:   <25%  Nausea: No  Hypoglycemia and intervention: No  Fever: No  TPN and/or TF: Yes TF @ 45 mL- however, TF currently being held as well as scheduled novolog    BP (!) 80/0 (BP Location: Left arm, Patient Position: Lying)   Pulse 72   Temp 97.8 °F (36.6 °C) (Axillary)   Resp 18   Ht 6' (1.829 m)   Wt 72.6 kg (160 lb)   SpO2 100%   BMI 21.70 kg/m²     Labs Reviewed and Include    No results for input(s): "GLU", "CALCIUM", "ALBUMIN", "PROT", "NA", "K", "CO2", "CL", "BUN", "CREATININE", "ALKPHOS", "ALT", "AST", "BILITOT" in the last 24 hours.  Lab Results   Component Value Date    WBC 6.99 01/04/2024    HGB 6.5 (L) 01/04/2024    HCT 19.8 (LL) 01/04/2024    MCV 87 01/04/2024     01/04/2024     No results for input(s): "TSH", "FREET4" in the last 168 hours.  Lab Results   Component Value Date    HGBA1C 7.6 (H) 10/12/2023       Nutritional status:   Body mass index is 21.7 kg/m².  Lab Results   Component Value Date    ALBUMIN 1.9 (L) 01/04/2024    ALBUMIN 1.9 (L) 01/03/2024    ALBUMIN 1.9 (L) 01/02/2024     Lab Results   Component Value Date    PREALBUMIN 30 01/03/2024    PREALBUMIN 29 01/01/2024    PREALBUMIN 26 12/29/2023       Estimated Creatinine Clearance: 18.1 mL/min (A) (based on SCr of 4.4 mg/dL (H)).    Accu-Checks  Recent Labs     01/03/24  1948 01/03/24  2331 01/04/24  0445 01/04/24  0514 01/04/24  0749 01/04/24  1149 01/04/24  2015 01/04/24  2334 01/05/24  0403 01/05/24  0817   POCTGLUCOSE 204* 111* 37* 189* 204* 219* 149* 149* 121* 121*       Current Medications and/or Treatments Impacting Glycemic Control  Immunotherapy:    Immunosuppressants       None          Steroids:   Hormones " (From admission, onward)      None          Pressors:    Autonomic Drugs (From admission, onward)      None          Hyperglycemia/Diabetes Medications:   Antihyperglycemics (From admission, onward)      Start     Stop Route Frequency Ordered    01/05/24 0800  insulin aspart U-100 pen 6 Units         -- SubQ Every 24 hours (non-standard times) 01/04/24 1214    01/05/24 0400  insulin aspart U-100 pen 4 Units         -- SubQ Every 24 hours (non-standard times) 01/04/24 1214    01/05/24 0000  insulin aspart U-100 pen 4 Units         -- SubQ Every 24 hours (non-standard times) 01/04/24 1214    01/04/24 2000  insulin aspart U-100 pen 4 Units         -- SubQ Every 24 hours (non-standard times) 01/04/24 1214    01/04/24 1600  insulin aspart U-100 pen 6 Units         -- SubQ Every 24 hours (non-standard times) 01/04/24 1214    01/04/24 1213  insulin aspart U-100 pen 6 Units         -- SubQ Every 24 hours (non-standard times) 01/04/24 1214    12/31/23 1108  insulin aspart U-100 pen 0-5 Units         -- SubQ Every 4 hours PRN 12/31/23 1008

## 2024-01-05 NOTE — PROGRESS NOTES
[facility] - Cardiology Stepdown  Nephrology  Progress Note    Patient Name: Radha Abbott  MRN: [id number]  Admission Date: 11/9/2023  Hospital Length of Stay: 57 days  Attending Provider: Brayan Ocampo MD   Primary Care Physician: Vasu Kong MD  Principal Problem:LVAD (left ventricular assist device) present    Subjective:     HPI: Mr. Abbott is a 63-year-old man with ischemic cardiomyopathy with most recent TTE showing EF 10 -15% and G3DD s/p Medtronik ICM placement on chronic dobutamine infusion, CAD s/p x3 vessel CABG back in 2009, type 2 diabetes mellitus (most recent hemoglobin A1c 7.6%), hypertension, HLD, history of ventricular fibrillation for which he is on amiodarone and CKD IIIb (baseline creatinine ~2-2.2) who was initially admitted on 11/9 with heart failure exacerbation and hypervolemia for which he was managed with inotrope with dobutamine in addition to IV diuresis. He ultimately underwent IABP placed on 11/21 for mechanical hemodynamic support and subsequently underwent LVAD placement on 12/1. His renal function appears to be mostly stable with IVÁN and creatinine in mid 2s to 3s in addition he is still making urine with Lasix infusion however on 12/5 Nephrology consulted given concern for uremia with BUN 72.     Interval History:   HD completed yesterday with net UF of 2.1L.  Tolerated well.  No complaints on exam this morning, UOP of 350 ml recorded over the past 24 hours.    Review of patient's allergies indicates:  No Known Allergies  Current Facility-Administered Medications   Medication Frequency    0.9%  NaCl infusion Continuous    acetaminophen tablet 1,000 mg TID    amiodarone tablet 400 mg Daily    balsam peru-castor oiL Oint BID    bisacodyL suppository 10 mg Daily PRN    dextrose 10 % infusion Continuous PRN    dextrose 10% bolus 125 mL 125 mL PRN    dextrose 10% bolus 125 mL 125 mL PRN    dextrose 10% bolus 250 mL 250 mL PRN    dextrose 10% bolus 250 mL 250 mL PRN    epoetin zohaib  injection 8,100 Units Every Tues, Thurs, Sat    famotidine tablet 20 mg Daily    gabapentin 250 mg/5 mL (5 mL) solution 125 mg Q12H    glucagon (human recombinant) injection 1 mg PRN    heparin (porcine) injection 1,000 Units PRN    hydrALAZINE injection 10 mg Q6H PRN    insulin aspart U-100 pen 0-5 Units Q4H PRN    insulin aspart U-100 pen 4 Units Q24H    insulin aspart U-100 pen 4 Units Q24H    insulin aspart U-100 pen 4 Units Q24H    insulin aspart U-100 pen 6 Units Q24H    insulin aspart U-100 pen 6 Units Q24H    insulin aspart U-100 pen 6 Units Q24H    ondansetron injection 4 mg Q6H PRN    polyethylene glycol packet 17 g Daily    psyllium husk (aspartame) 3.4 gram packet 1 packet Daily    senna-docusate 8.6-50 mg per tablet 2 tablet Daily    sodium chloride 0.9% flush 10 mL PRN    traMADoL tablet 50 mg Q8H PRN    traMADoL tablet 50 mg Once    traZODone tablet 50 mg QHS    venlafaxine tablet 37.5 mg Daily    warfarin (COUMADIN) tablet 2.5 mg Every Mon, Wed, Fri    warfarin (COUMADIN) tablet 5 mg Every Tues, Thurs, Sat, Sun    zolpidem tablet 5 mg Nightly PRN       Objective:     Vital Signs (Most Recent):  Temp: 97.5 °F (36.4 °C) (01/05/24 1113)  Pulse: (!) 50 (01/05/24 1113)  Resp: 18 (01/05/24 1113)  BP: 111/70 (01/05/24 1113)  SpO2: 99 % (01/05/24 1113) Vital Signs (24h Range):  Temp:  [97.3 °F (36.3 °C)-98.3 °F (36.8 °C)] 97.5 °F (36.4 °C)  Pulse:  [50-99] 50  Resp:  [16-19] 18  SpO2:  [93 %-100 %] 99 %  BP: ()/(0-71) 111/70     Weight: 72.6 kg (160 lb) (01/05/24 0814)  Body mass index is 21.7 kg/m².  Body surface area is 1.92 meters squared.    I/O last 3 completed shifts:  In: 981 [P.O.:381; NG/GT:600]  Out: 2625 [Urine:525; Other:2100]     Physical Exam  Vitals and nursing note reviewed.   HENT:      Head: Normocephalic and atraumatic.      Mouth/Throat:      Mouth: Mucous membranes are dry.   Eyes:      Conjunctiva/sclera: Conjunctivae normal.   Cardiovascular:      Comments: LVAD   Pulmonary:       Effort: Pulmonary effort is normal. No respiratory distress.      Breath sounds: No wheezing or rales.      Comments: RA  Abdominal:      Palpations: Abdomen is soft.   Musculoskeletal:         General: No swelling.      Right lower leg: No edema.      Left lower leg: No edema.   Skin:     General: Skin is warm.   Neurological:      Mental Status: He is alert and oriented to person, place, and time.   Psychiatric:         Mood and Affect: Mood normal.         Behavior: Behavior normal.          Significant Labs:  CBC:   Recent Labs   Lab 01/05/24  1059   WBC 7.21   RBC 2.70*   HGB 7.6*   HCT 24.9*   *   MCV 92   MCH 28.1   MCHC 30.5*     CMP:   Recent Labs   Lab 01/05/24  1059   *   CALCIUM 9.1   ALBUMIN 2.2*   PROT 8.0      K 4.3   CO2 23      BUN 46*   CREATININE 3.6*   ALKPHOS 181*   ALT 41   AST 68*   BILITOT 0.4        Assessment/Plan:     Cardiac/Vascular  Acute on chronic combined systolic and diastolic CHF, NYHA class 4  - management per primary team  - LVAD for mechanical support    Renal/  Acute renal failure with acute tubular necrosis superimposed on stage 3b chronic kidney disease  - suspect acute tubular injury secondary to hypotension/cardiogenic shock likely compounded by intra-arterial contrast and anemia with urinary sediment with granular casts    -No need for further RRT today  -Lasix 160 mg IV x 1.   -Hgb 7.6.  Started EPO with HD  -recommend starting on IV iron, recommend Feraheme 510 mg IV   -Continue bladder scans q shift  -strict I/O's  -renal diet/tube feeds when not NPO, volume restriction per primary team  -renally all dose medications to eGFR  -avoid nephrotoxic agents wean feasible (i.e. NSAIDs, intra-arterial contrast, supra-therapeutic vancomycin levels, etc.)        Norm Vidal, NP  Nephrology  [facility] - Cardiology Stepdown

## 2024-01-05 NOTE — PLAN OF CARE
Problem: Adult Inpatient Plan of Care  Goal: Plan of Care Review  Outcome: Ongoing, Progressing  Goal: Patient-Specific Goal (Individualized)  Outcome: Ongoing, Progressing  Goal: Absence of Hospital-Acquired Illness or Injury  Outcome: Ongoing, Progressing  Goal: Optimal Comfort and Wellbeing  Outcome: Ongoing, Progressing  Goal: Readiness for Transition of Care  Outcome: Ongoing, Progressing     Problem: Diabetes Comorbidity  Goal: Blood Glucose Level Within Targeted Range  Outcome: Ongoing, Progressing     Problem: Fluid and Electrolyte Imbalance (Acute Kidney Injury/Impairment)  Goal: Fluid and Electrolyte Balance  Outcome: Ongoing, Progressing     Problem: Oral Intake Inadequate (Acute Kidney Injury/Impairment)  Goal: Optimal Nutrition Intake  Outcome: Ongoing, Progressing     Problem: Renal Function Impairment (Acute Kidney Injury/Impairment)  Goal: Effective Renal Function  Outcome: Ongoing, Progressing     Problem: Infection  Goal: Absence of Infection Signs and Symptoms  Outcome: Ongoing, Progressing     Problem: Adjustment to Illness (Heart Failure)  Goal: Optimal Coping  Outcome: Ongoing, Progressing     Problem: Cardiac Output Decreased (Heart Failure)  Goal: Optimal Cardiac Output  Outcome: Ongoing, Progressing     Problem: Dysrhythmia (Heart Failure)  Goal: Stable Heart Rate and Rhythm  Outcome: Ongoing, Progressing     Problem: Fluid Imbalance (Heart Failure)  Goal: Fluid Balance  Outcome: Ongoing, Progressing     Problem: Functional Ability Impaired (Heart Failure)  Goal: Optimal Functional Ability  Outcome: Ongoing, Progressing     Problem: Oral Intake Inadequate (Heart Failure)  Goal: Optimal Nutrition Intake  Outcome: Ongoing, Progressing     Problem: Respiratory Compromise (Heart Failure)  Goal: Effective Oxygenation and Ventilation  Outcome: Ongoing, Progressing     Problem: Sleep Disordered Breathing (Heart Failure)  Goal: Effective Breathing Pattern During Sleep  Outcome: Ongoing,  Progressing     Problem: Cardiac Output Decreased  Goal: Effective Cardiac Output  Outcome: Ongoing, Progressing     Problem: Fall Injury Risk  Goal: Absence of Fall and Fall-Related Injury  Outcome: Ongoing, Progressing     Problem: Coping Ineffective  Goal: Effective Coping  Outcome: Ongoing, Progressing     Problem: Skin Injury Risk Increased  Goal: Skin Health and Integrity  Outcome: Ongoing, Progressing     Problem: Adjustment to Device (Ventricular Assist Device)  Goal: Optimal Adjustment to Device  Outcome: Ongoing, Progressing     Problem: Bleeding (Ventricular Assist Device)  Goal: Absence of Bleeding  Outcome: Ongoing, Progressing     Problem: Embolism (Ventricular Assist Device)  Goal: Absence of Embolism Signs and Symptoms  Outcome: Ongoing, Progressing     Problem: Hemodynamic Instability (Ventricular Assist Device)  Goal: Optimal Blood Flow  Outcome: Ongoing, Progressing     Problem: Infection (Ventricular Assist Device)  Goal: Absence of Infection Signs and Symptoms  Outcome: Ongoing, Progressing     Problem: Right-Sided Heart Compromise (Ventricular Assist Device)  Goal: Effective Right-Sided Heart Function  Outcome: Ongoing, Progressing     Problem: Adjustment to Illness (Sepsis/Septic Shock)  Goal: Optimal Coping  Outcome: Ongoing, Progressing     Problem: Bleeding (Sepsis/Septic Shock)  Goal: Absence of Bleeding  Outcome: Ongoing, Progressing     Problem: Glycemic Control Impaired (Sepsis/Septic Shock)  Goal: Blood Glucose Level Within Desired Range  Outcome: Ongoing, Progressing     Problem: Infection Progression (Sepsis/Septic Shock)  Goal: Absence of Infection Signs and Symptoms  Outcome: Ongoing, Progressing     Problem: Nutrition Impaired (Sepsis/Septic Shock)  Goal: Optimal Nutrition Intake  Outcome: Ongoing, Progressing     Problem: Impaired Wound Healing  Goal: Optimal Wound Healing  Outcome: Ongoing, Progressing     Problem: Device-Related Complication Risk (Hemodialysis)  Goal: Safe,  Effective Therapy Delivery  Outcome: Ongoing, Progressing     Problem: Hemodynamic Instability (Hemodialysis)  Goal: Effective Tissue Perfusion  Outcome: Ongoing, Progressing     Problem: Infection (Hemodialysis)  Goal: Absence of Infection Signs and Symptoms  Outcome: Ongoing, Progressing

## 2024-01-05 NOTE — ASSESSMENT & PLAN NOTE
IVÁN on CKD2. Baseline Cr of 1.7-2.0.   - Hold ARNi  -Trend BMPs daily   -Nephrology following   -SLED/SCUF for volume removal began on 12/1. He is anuric  -Did not respond to diuretic challenge with 240 mg IV Lasix, 1000 mg Diuril, and 500 mg IV Diamox done 12/14  -Now on HD TThSat  -Tunneled trialysis line placed 12/22 by IR  -Neph recommending diuretic challenge with 160 mg IVP Lasix

## 2024-01-05 NOTE — ASSESSMENT & PLAN NOTE
-HeartMate 3 Implanted  12/1/2023  as DT  -HTS Primary  -Implanted by Dr. Washington  -Continue Coumadin, dosing by PharmD.  Goal INR 2.0-3.0 . Therapeutic today.   -Antiplatelets Not on ASA  -LDH is stable overall today. Will continue to monitor daily.  -Weaned off midodrine.  -Speed set at  4900   rpm, LSL 4500 rpm   -Interrogation notable for no events  -ECHO 1/2/24 mild TR, LVEDD 6.25 with HM3 set to 4900 rpm   -Not listed for OHTx, declined for OHTX due to comorbidities   -PT/OT/Speech. Recommending rehab but HD+LVAD may be barrier.       Procedure: Device Interrogation Including analysis of device parameters  Current Settings: Ventricular Assist Device  Review of device function is stable/unstable stable        1/5/2024     8:00 AM 1/5/2024    12:00 AM 1/4/2024     8:00 PM 1/4/2024     4:00 PM 1/4/2024    12:00 PM 1/4/2024     7:46 AM 1/4/2024     4:33 AM   TXP LVAD INTERROGATIONS   Type HeartMate3  HeartMate3 HeartMate3 HeartMate3 HeartMate3 HeartMate3   Flow 3.9 4.1 4 4 3.6 3.8 3.8   Speed 4900 5900 4900 4900 4900 4950 4950   PI 5.4 1.3 4.5 4.1 4.6 4.8 4.6   Power ([place]) 3.4 3.3 3.3 3.3 3.2 3.2 3.3   LSL 4500  4500 4500 4500 4500    Low Flow Alarm no no no no no no    High Power Alarm no no no no no no    Pulsatility Intermittent pulse Intermittent pulse Intermittent pulse Intermittent pulse Intermittent pulse Intermittent pulse Intermittent pulse

## 2024-01-05 NOTE — SUBJECTIVE & OBJECTIVE
Interval History: ID called back for right cervical swelling. He was initially admitted 11/9 for heart failure decompensation, had balloon pump placed 11/21 then subsequently DT LVAD on 12/1. Chest wall left over, course complicated by coagulopathy and RV dysfunction, renal failure secondary to ATN. He was intubated for respiratory failure, BAL culture no growth but treated empirically for VAP with meropenem and vancomycin until 12/14. His wife was recently diagnosed with flu, was at bedside until yesterday AM. He reports throat pain with swallowing as well as neck pain. He has some rhinorrhea and productive cough. Never had anything like this before. Denies fevers or chills.     Review of Systems   Constitutional:  Positive for activity change, appetite change and fatigue. Negative for chills and fever.   HENT:  Positive for congestion, dental problem (multiple teeth pulled), facial swelling, rhinorrhea, sore throat and trouble swallowing. Negative for hearing loss, mouth sores, postnasal drip, sinus pressure, sinus pain and sneezing.    Eyes: Negative.    Respiratory:  Positive for cough.    Skin:  Negative for wound.   All other systems reviewed and are negative.    Objective:     Vital Signs (Most Recent):  Temp: 97.5 °F (36.4 °C) (01/05/24 1113)  Pulse: 87 (01/05/24 1400)  Resp: 18 (01/05/24 1113)  BP: 111/70 (01/05/24 1113)  SpO2: 99 % (01/05/24 1113) Vital Signs (24h Range):  Temp:  [97.3 °F (36.3 °C)-98.3 °F (36.8 °C)] 97.5 °F (36.4 °C)  Pulse:  [68-99] 87  Resp:  [16-19] 18  SpO2:  [94 %-100 %] 99 %  BP: ()/(0-70) 111/70     Weight: 72.6 kg (160 lb)  Body mass index is 21.7 kg/m².    Estimated Creatinine Clearance: 22.1 mL/min (A) (based on SCr of 3.6 mg/dL (H)).     Physical Exam  Vitals and nursing note reviewed.   Constitutional:       Appearance: He is ill-appearing.   HENT:      Right Ear: External ear normal.      Left Ear: External ear normal.      Nose:      Comments: NG tube     Mouth/Throat:       Mouth: Mucous membranes are moist.      Pharynx: Oropharynx is clear. No oropharyngeal exudate or posterior oropharyngeal erythema.      Comments: Poor dentition  Eyes:      General:         Right eye: No discharge.         Left eye: No discharge.      Conjunctiva/sclera: Conjunctivae normal.   Pulmonary:      Effort: Pulmonary effort is normal.   Abdominal:      General: There is no distension.      Palpations: Abdomen is soft.   Musculoskeletal:         General: Normal range of motion.      Cervical back: Normal range of motion. Tenderness present.   Lymphadenopathy:      Cervical: Cervical adenopathy (right sided, tender to palpation) present.   Skin:     General: Skin is warm and dry.   Neurological:      General: No focal deficit present.      Mental Status: He is alert and oriented to person, place, and time.   Psychiatric:         Behavior: Behavior normal.          Significant Labs:   Microbiology Results (last 7 days)       Procedure Component Value Units Date/Time    Influenza A & B by Molecular [[id number]]     Order Status: No result Specimen: Nasopharyngeal Swab     Group A Strep, Molecular [[id number]] Collected: 01/05/24 1004    Order Status: Completed Specimen: Throat Updated: 01/05/24 1039     Group A Strep, Molecular Negative     Comment: Arcanobacterium haemolyticum and Beta Streptococcus group C   and G will not be detected by this test method.  Please order   Throat Culture ([id number]) if suspected.         Throat culture [[id number]] Collected: 01/04/24 1942    Order Status: Completed Specimen: Throat Updated: 01/05/24 0922     RESPIRATORY CULTURE - THROAT Insufficient incubation, culture in progress    Respiratory Infection Panel (PCR), Nasopharyngeal [[id number]] Collected: 01/04/24 1942    Order Status: Completed Specimen: Nasopharyngeal Swab Updated: 01/04/24 2101     Respiratory Infection Panel Source NP Swab     Adenovirus Not Detected     Coronavirus 229E, Common Cold Virus Not  Detected     Coronavirus HKU1, Common Cold Virus Not Detected     Coronavirus NL63, Common Cold Virus Not Detected     Coronavirus OC43, Common Cold Virus Not Detected     Comment: The Coronavirus strains detected in this test cause the common cold.  These strains are not the COVID-19 (novel Coronavirus)strain   associated with the respiratory disease outbreak.          SARS-CoV2 (COVID-19) Qualitative PCR Not Detected     Human Metapneumovirus Not Detected     Human Rhinovirus/Enterovirus Not Detected     Influenza A (subtypes H1, H1-2009,H3) Not Detected     Influenza B Not Detected     Parainfluenza Virus 1 Not Detected     Parainfluenza Virus 2 Not Detected     Parainfluenza Virus 3 Not Detected     Parainfluenza Virus 4 Not Detected     Respiratory Syncytial Virus Not Detected     Bordetella Parapertussis (MX3065) Not Detected     Bordetella pertussis (ptxP) Not Detected     Chlamydia pneumoniae Not Detected     Mycoplasma pneumoniae Not Detected    Narrative:      For all other respiratory sources, order [id number] -  Respiratory Viral Panel by PCR            Significant Imaging: I have reviewed all pertinent imaging results/findings within the past 24 hours.

## 2024-01-05 NOTE — NURSING
Patient NG tube was clogged. After multiple unsuccessful attempts to unclog NG tube was pulled and new NG tube placed at 0100. Order for CXR placed to verify location. Patient indicated he does not want tube feeding restarted. He will wait until morning to decide if he want it restarted based of level of ABD comfort. Dr Omid Manzanares notified of patient refusing to have tube feeding restarted.      0440. Dr Destiny Sánchez Ok'd holding 0400 dose of aspart.Will continue to monitor.

## 2024-01-05 NOTE — PROGRESS NOTES
Update:  SW followed up with pt. Pt's sister at bedside today.  Pt reported that his wife is home sick at this time.  Pt reported that he is holding strong at this time even with set backs. Pt's sister being very positive and supportive.  No needs have been reported at this time.

## 2024-01-05 NOTE — PLAN OF CARE
Pt educated on fall risk, pt remained free from falls/trauma/injury. Denies chest pain, SOB, palpitations or dizziness. LVAD settings WNL and with no alarms. Plan of care reviewed with pt. Bed locked in lowest position, call bell within reach, no acute distress noted, will continue to monitor.    Problem: Adult Inpatient Plan of Care  Goal: Patient-Specific Goal (Individualized)  Outcome: Ongoing, Progressing  Goal: Absence of Hospital-Acquired Illness or Injury  Outcome: Ongoing, Progressing  Goal: Optimal Comfort and Wellbeing  Outcome: Ongoing, Progressing  Goal: Readiness for Transition of Care  Outcome: Ongoing, Progressing     Problem: Diabetes Comorbidity  Goal: Blood Glucose Level Within Targeted Range  Outcome: Ongoing, Progressing     Problem: Fluid and Electrolyte Imbalance (Acute Kidney Injury/Impairment)  Goal: Fluid and Electrolyte Balance  Outcome: Ongoing, Progressing     Problem: Oral Intake Inadequate (Acute Kidney Injury/Impairment)  Goal: Optimal Nutrition Intake  Outcome: Ongoing, Progressing     Problem: Renal Function Impairment (Acute Kidney Injury/Impairment)  Goal: Effective Renal Function  Outcome: Ongoing, Progressing     Problem: Infection  Goal: Absence of Infection Signs and Symptoms  Outcome: Ongoing, Progressing     Problem: Adjustment to Illness (Heart Failure)  Goal: Optimal Coping  Outcome: Ongoing, Progressing     Problem: Cardiac Output Decreased (Heart Failure)  Goal: Optimal Cardiac Output  Outcome: Ongoing, Progressing     Problem: Dysrhythmia (Heart Failure)  Goal: Stable Heart Rate and Rhythm  Outcome: Ongoing, Progressing     Problem: Fluid Imbalance (Heart Failure)  Goal: Fluid Balance  Outcome: Ongoing, Progressing     Problem: Functional Ability Impaired (Heart Failure)  Goal: Optimal Functional Ability  Outcome: Ongoing, Progressing

## 2024-01-05 NOTE — PROGRESS NOTES
01/05/2024  Jonathan ANAMARIA Ho Jr    Current provider:  Brayan Ocampo MD    Device interrogation:      1/5/2024    12:00 AM 1/4/2024     8:00 PM 1/4/2024     4:00 PM 1/4/2024    12:00 PM 1/4/2024     7:46 AM 1/4/2024     4:33 AM 1/4/2024    12:00 AM   TXP LVAD INTERROGATIONS   Type  HeartMate3 HeartMate3 HeartMate3 HeartMate3 HeartMate3 HeartMate3   Flow 4.1 4 4 3.6 3.8 3.8 3.8   Speed 5900 4900 4900 4900 4950 4950 4900   PI 1.3 4.5 4.1 4.6 4.8 4.6 4.8   Power ([place]) 3.3 3.3 3.3 3.2 3.2 3.3 3.2   LSL  4500 4500 4500 4500     Low Flow Alarm no no no no no     High Power Alarm no no no no no     Pulsatility Intermittent pulse Intermittent pulse Intermittent pulse Intermittent pulse Intermittent pulse Intermittent pulse Intermittent pulse          Rounded on [facility] to ensure all mechanical assist device settings (IABP or VAD) were appropriate and all parameters were within limits.  I was able to ensure all back up equipment was present, the staff had no issues, and the Perfusion Department daily rounding was complete.      For implantable VADs: Interrogation of Ventricular assist device was performed with analysis of device parameters and review of device function. I have personally reviewed the interrogation findings and agree with findings as stated.     In emergency, the nursing units have been notified to contact the perfusion department either by:  Calling [id number] from 630am to 4pm Mon thru Fri, utilizing the On-Call Finder functionality of Epic and searching for Perfusion, or by contacting the hospital  from 4pm to 630am and on weekends and asking to speak with the perfusionist on call.    6:58 AM

## 2024-01-05 NOTE — PLAN OF CARE
UPDATE:    Patient advanced diet after MBSS  Minced and moist foods  Honey thickened liquids  NGT came out yesterday; however, was replaced--patient requested a hold on feeds over night  after NGT was replaced  Will follow up Monday to assess po intake and possibility of removing NGT in order to place OP HD referral for LVAD needing OP HD chair    Aggressive PT/OT is needed daily--click mobility scores are low at 12/12  Therapy recs are Rehab  Patient's current mobility and LVAD may be a barrier to OP HD placement     Recs aggressive PT/OT, increase po intake as tolerated, pull NGT as soon as possible    Chair orders/labs drawn last week for dispo planning  Will consult PM&R and send rehab referral closer to [facility] wanting OP HD referral    Patient remains with NGT which is not accepted by OP HD centers    Patient will need to be taking in 100% meals/liquids po or via a PEG in order to get HD acceptance    The biggest barrier will be placing him in OP HD with LVAD along with low mobility scores

## 2024-01-05 NOTE — PROGRESS NOTES
Patient was seen today in the hospital. Patient awake at time of visit. Patient refused VAD equipment education at this time.

## 2024-01-06 PROBLEM — K11.8 PAROTID MASS: Status: ACTIVE | Noted: 2024-01-06

## 2024-01-06 LAB
ALBUMIN SERPL BCP-MCNC: 2 G/DL (ref 3.5–5.2)
ALP SERPL-CCNC: 181 U/L (ref 55–135)
ALT SERPL W/O P-5'-P-CCNC: 38 U/L (ref 10–44)
ANION GAP SERPL CALC-SCNC: 13 MMOL/L (ref 8–16)
AST SERPL-CCNC: 56 U/L (ref 10–40)
BASOPHILS # BLD AUTO: 0.06 K/UL (ref 0–0.2)
BASOPHILS NFR BLD: 0.7 % (ref 0–1.9)
BILIRUB DIRECT SERPL-MCNC: 0.2 MG/DL (ref 0.1–0.3)
BILIRUB SERPL-MCNC: 0.3 MG/DL (ref 0.1–1)
BUN SERPL-MCNC: 60 MG/DL (ref 8–23)
CALCIUM SERPL-MCNC: 8.8 MG/DL (ref 8.7–10.5)
CHLORIDE SERPL-SCNC: 100 MMOL/L (ref 95–110)
CO2 SERPL-SCNC: 23 MMOL/L (ref 23–29)
CREAT SERPL-MCNC: 4.3 MG/DL (ref 0.5–1.4)
DIFFERENTIAL METHOD BLD: ABNORMAL
EOSINOPHIL # BLD AUTO: 0.3 K/UL (ref 0–0.5)
EOSINOPHIL NFR BLD: 3.5 % (ref 0–8)
ERYTHROCYTE [DISTWIDTH] IN BLOOD BY AUTOMATED COUNT: 16.2 % (ref 11.5–14.5)
EST. GFR  (NO RACE VARIABLE): 14.9 ML/MIN/1.73 M^2
GLUCOSE SERPL-MCNC: 136 MG/DL (ref 70–110)
HCT VFR BLD AUTO: 22.9 % (ref 40–54)
HGB BLD-MCNC: 6.8 G/DL (ref 14–18)
IMM GRANULOCYTES # BLD AUTO: 0.04 K/UL (ref 0–0.04)
IMM GRANULOCYTES NFR BLD AUTO: 0.5 % (ref 0–0.5)
INR PPP: 2.3 (ref 0.8–1.2)
LDH SERPL L TO P-CCNC: 342 U/L (ref 110–260)
LYMPHOCYTES # BLD AUTO: 0.9 K/UL (ref 1–4.8)
LYMPHOCYTES NFR BLD: 11.5 % (ref 18–48)
MAGNESIUM SERPL-MCNC: 2.1 MG/DL (ref 1.6–2.6)
MCH RBC QN AUTO: 27.3 PG (ref 27–31)
MCHC RBC AUTO-ENTMCNC: 29.7 G/DL (ref 32–36)
MCV RBC AUTO: 92 FL (ref 82–98)
MONOCYTES # BLD AUTO: 0.7 K/UL (ref 0.3–1)
MONOCYTES NFR BLD: 8.5 % (ref 4–15)
NEUTROPHILS # BLD AUTO: 6 K/UL (ref 1.8–7.7)
NEUTROPHILS NFR BLD: 75.3 % (ref 38–73)
NRBC BLD-RTO: 0 /100 WBC
PHOSPHATE SERPL-MCNC: 2.7 MG/DL (ref 2.7–4.5)
PLATELET # BLD AUTO: 450 K/UL (ref 150–450)
PMV BLD AUTO: 9.8 FL (ref 9.2–12.9)
POCT GLUCOSE: 124 MG/DL (ref 70–110)
POCT GLUCOSE: 130 MG/DL (ref 70–110)
POCT GLUCOSE: 135 MG/DL (ref 70–110)
POCT GLUCOSE: 148 MG/DL (ref 70–110)
POCT GLUCOSE: 192 MG/DL (ref 70–110)
POCT GLUCOSE: 211 MG/DL (ref 70–110)
POCT GLUCOSE: 222 MG/DL (ref 70–110)
POTASSIUM SERPL-SCNC: 4.1 MMOL/L (ref 3.5–5.1)
PROT SERPL-MCNC: 7.3 G/DL (ref 6–8.4)
PROTHROMBIN TIME: 23.2 SEC (ref 9–12.5)
RBC # BLD AUTO: 2.49 M/UL (ref 4.6–6.2)
SODIUM SERPL-SCNC: 136 MMOL/L (ref 136–145)
WBC # BLD AUTO: 8.02 K/UL (ref 3.9–12.7)

## 2024-01-06 PROCEDURE — 25000003 PHARM REV CODE 250: Performed by: INTERNAL MEDICINE

## 2024-01-06 PROCEDURE — 99232 SBSQ HOSP IP/OBS MODERATE 35: CPT | Mod: ,,, | Performed by: NURSE PRACTITIONER

## 2024-01-06 PROCEDURE — 99232 SBSQ HOSP IP/OBS MODERATE 35: CPT | Mod: ,,, | Performed by: STUDENT IN AN ORGANIZED HEALTH CARE EDUCATION/TRAINING PROGRAM

## 2024-01-06 PROCEDURE — 97116 GAIT TRAINING THERAPY: CPT

## 2024-01-06 PROCEDURE — 83615 LACTATE (LD) (LDH) ENZYME: CPT | Performed by: STUDENT IN AN ORGANIZED HEALTH CARE EDUCATION/TRAINING PROGRAM

## 2024-01-06 PROCEDURE — 25000003 PHARM REV CODE 250: Performed by: NURSE PRACTITIONER

## 2024-01-06 PROCEDURE — 80048 BASIC METABOLIC PNL TOTAL CA: CPT | Performed by: NURSE PRACTITIONER

## 2024-01-06 PROCEDURE — 99232 SBSQ HOSP IP/OBS MODERATE 35: CPT | Mod: ,,, | Performed by: OTOLARYNGOLOGY

## 2024-01-06 PROCEDURE — 85610 PROTHROMBIN TIME: CPT | Performed by: INTERNAL MEDICINE

## 2024-01-06 PROCEDURE — 97530 THERAPEUTIC ACTIVITIES: CPT

## 2024-01-06 PROCEDURE — 84100 ASSAY OF PHOSPHORUS: CPT | Performed by: INTERNAL MEDICINE

## 2024-01-06 PROCEDURE — 25000003 PHARM REV CODE 250: Performed by: STUDENT IN AN ORGANIZED HEALTH CARE EDUCATION/TRAINING PROGRAM

## 2024-01-06 PROCEDURE — 20600001 HC STEP DOWN PRIVATE ROOM

## 2024-01-06 PROCEDURE — 99233 SBSQ HOSP IP/OBS HIGH 50: CPT | Mod: ,,, | Performed by: INTERNAL MEDICINE

## 2024-01-06 PROCEDURE — 63600175 PHARM REV CODE 636 W HCPCS: Performed by: STUDENT IN AN ORGANIZED HEALTH CARE EDUCATION/TRAINING PROGRAM

## 2024-01-06 PROCEDURE — 25000003 PHARM REV CODE 250

## 2024-01-06 PROCEDURE — 93750 INTERROGATION VAD IN PERSON: CPT | Mod: ,,, | Performed by: INTERNAL MEDICINE

## 2024-01-06 PROCEDURE — 80076 HEPATIC FUNCTION PANEL: CPT | Performed by: INTERNAL MEDICINE

## 2024-01-06 PROCEDURE — 85025 COMPLETE CBC W/AUTO DIFF WBC: CPT | Performed by: INTERNAL MEDICINE

## 2024-01-06 PROCEDURE — 27000248 HC VAD-ADDITIONAL DAY

## 2024-01-06 PROCEDURE — 83735 ASSAY OF MAGNESIUM: CPT | Performed by: INTERNAL MEDICINE

## 2024-01-06 PROCEDURE — 63600175 PHARM REV CODE 636 W HCPCS

## 2024-01-06 PROCEDURE — 97535 SELF CARE MNGMENT TRAINING: CPT

## 2024-01-06 RX ADMIN — ACETAMINOPHEN 1000 MG: 500 TABLET ORAL at 08:01

## 2024-01-06 RX ADMIN — TRAMADOL HYDROCHLORIDE 50 MG: 50 TABLET, COATED ORAL at 08:01

## 2024-01-06 RX ADMIN — ACETAMINOPHEN 1000 MG: 500 TABLET ORAL at 10:01

## 2024-01-06 RX ADMIN — TRAZODONE HYDROCHLORIDE 50 MG: 50 TABLET ORAL at 08:01

## 2024-01-06 RX ADMIN — FAMOTIDINE 20 MG: 20 TABLET, FILM COATED ORAL at 08:01

## 2024-01-06 RX ADMIN — Medication: at 08:01

## 2024-01-06 RX ADMIN — INSULIN ASPART 6 UNITS: 100 INJECTION, SOLUTION INTRAVENOUS; SUBCUTANEOUS at 08:01

## 2024-01-06 RX ADMIN — OSELTAMIVIR PHOSPHATE 30 MG: 30 CAPSULE ORAL at 05:01

## 2024-01-06 RX ADMIN — INSULIN ASPART 6 UNITS: 100 INJECTION, SOLUTION INTRAVENOUS; SUBCUTANEOUS at 05:01

## 2024-01-06 RX ADMIN — VENLAFAXINE 37.5 MG: 37.5 TABLET ORAL at 08:01

## 2024-01-06 RX ADMIN — ACETAMINOPHEN 1000 MG: 500 TABLET ORAL at 02:01

## 2024-01-06 RX ADMIN — AMIODARONE HYDROCHLORIDE 400 MG: 200 TABLET ORAL at 08:01

## 2024-01-06 RX ADMIN — GABAPENTIN 125 MG: 250 SOLUTION ORAL at 08:01

## 2024-01-06 RX ADMIN — SENNOSIDES AND DOCUSATE SODIUM 2 TABLET: 8.6; 5 TABLET ORAL at 08:01

## 2024-01-06 RX ADMIN — Medication 2000 UNITS: at 08:01

## 2024-01-06 RX ADMIN — INSULIN ASPART 4 UNITS: 100 INJECTION, SOLUTION INTRAVENOUS; SUBCUTANEOUS at 04:01

## 2024-01-06 RX ADMIN — WARFARIN SODIUM 5 MG: 5 TABLET ORAL at 05:01

## 2024-01-06 RX ADMIN — INSULIN ASPART 6 UNITS: 100 INJECTION, SOLUTION INTRAVENOUS; SUBCUTANEOUS at 12:01

## 2024-01-06 RX ADMIN — INSULIN ASPART 4 UNITS: 100 INJECTION, SOLUTION INTRAVENOUS; SUBCUTANEOUS at 08:01

## 2024-01-06 RX ADMIN — PIPERACILLIN SODIUM AND TAZOBACTAM SODIUM 4.5 G: 4; .5 INJECTION, POWDER, FOR SOLUTION INTRAVENOUS at 02:01

## 2024-01-06 NOTE — ASSESSMENT & PLAN NOTE
Lab Results   Component Value Date    CREATININE 4.3 (H) 01/06/2024     Avoid insulin stacking  Titrate insulin slowly

## 2024-01-06 NOTE — ASSESSMENT & PLAN NOTE
63M with CHF s/p LVAD on 12/1. ENT previously performed bedside injection augmentation of left true vocal fold on 12/19. ENT re-consulted regarding right parotid mass on recent CT imaging. Discussed with patient that this is an incidental finding and likely benign in nature.     -- No acute interventions warranted from ENT  -- Given LVAD status and no plans for heart transplant in the future, would not recommend biopsy or further workup of parotid mass  -- Please Secure Chat me for any questions, page ENT on-call if unresponsive or urgent

## 2024-01-06 NOTE — ASSESSMENT & PLAN NOTE
- 2.2cm R. Parotid mass noted.   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 Can follow up as outpatient w/ ENT. No further interventions needed as inpatient.

## 2024-01-06 NOTE — PROGRESS NOTES
[facility] - Cardiology Stepdown  Otorhinolaryngology-Head & Neck Surgery  Progress Note    Subjective:     Post-Op Info:  Procedure(s) (LRB):  CLOSURE, WOUND, STERNUM (N/A)  INSERTION, GRAFT, PERICARDIUM  DRAINAGE, PLEURAL EFFUSION   33 Days Post-Op  Hospital Day: 59     Interval History: ENT re-consulted regarding new incidental finding of right parotid mass. Per chart review, he developed right neck swelling and odynophagia. Respiratory cultures with preliminary pseudomonas growth. A CT neck was obtained which showed an incidental mass of the right parotid gland. ENT consulted for further evaluation. Patient states he started feeling the right parotid mass a few days ago. Denies any fluctuating swelling on the right. No history of head and neck cancer or head and neck radiation. Tolerating PO. Awaiting placement at this time.    Medications:  Continuous Infusions:   sodium chloride 0.9%      dextrose 10 % in water (D10W)       Scheduled Meds:   acetaminophen  1,000 mg Per NG tube TID    amiodarone  400 mg Per NG tube Daily    balsam peru-castor oiL   Topical (Top) BID    cholecalciferol (vitamin D3)  2,000 Units Per NG tube Daily    epoetin zohaib (PROCRIT) injection  50 Units/kg Subcutaneous Every Tues, Thurs, Sat    famotidine  20 mg Per G Tube Daily    gabapentin  125 mg Per NG tube Q12H    insulin aspart U-100  4 Units Subcutaneous Q24H    insulin aspart U-100  4 Units Subcutaneous Q24H    insulin aspart U-100  4 Units Subcutaneous Q24H    insulin aspart U-100  6 Units Subcutaneous Q24H    insulin aspart U-100  6 Units Subcutaneous Q24H    insulin aspart U-100  6 Units Subcutaneous Q24H    oseltamivir  30 mg Oral Every Tues, Thurs, Sat    piperacillin-tazobactam (Zosyn) IV (PEDS and ADULTS) (extended infusion is not appropriate)  4.5 g Intravenous Q12H    polyethylene glycol  17 g Per NG tube Daily    psyllium husk (aspartame)  1 packet Per NG tube Daily    senna-docusate 8.6-50 mg  2 tablet Per NG tube Daily     traMADoL  50 mg Oral Once    traZODone  50 mg Per NG tube QHS    venlafaxine  37.5 mg Per G Tube Daily    warfarin  2.5 mg Per G Tube Every Mon, Wed, Fri    warfarin  5 mg Per G Tube Every Tues, Thurs, Sat, Sun     PRN Meds:bisacodyL, dextrose 10 % in water (D10W), dextrose 10%, dextrose 10%, dextrose 10%, dextrose 10%, glucagon (human recombinant), heparin (porcine), hydrALAZINE, insulin aspart U-100, ondansetron, Flushing PICC/Midline Protocol **AND** [DISCONTINUED] sodium chloride 0.9% **AND** sodium chloride 0.9%, traMADoL, zolpidem     Review of patient's allergies indicates:  No Known Allergies  Objective:     Vital Signs (24h Range):  Temp:  [97.4 °F (36.3 °C)-98.1 °F (36.7 °C)] 98.1 °F (36.7 °C)  Pulse:  [] 62  Resp:  [16-19] 18  SpO2:  [97 %-100 %] 99 %  BP: ()/(0-80) 68/0     Date 01/06/24 0700 - 01/07/24 0659   Shift 6644-2341 8654-7035 7832-4529 24 Hour Total   INTAKE   NG/GT 50   50   Shift Total(mL/kg) 50(0.7)   50(0.7)   OUTPUT   Shift Total(mL/kg)       Weight (kg) 66.8 66.8 66.8 66.8     Lines/Drains/Airways       Peripherally Inserted Central Catheter Line  Duration             PICC Triple Lumen 12/05/23 1208 right basilic 32 days              Central Venous Catheter Line  Duration                  Hemodialysis Catheter 12/22/23 1024 right internal jugular 15 days              Drain  Duration                  NG/OG Tube 01/04/24 0104 Left nostril 2 days              Line  Duration                  VAD 12/01/23 1015 Left ventricular assist device HeartMate 3 36 days                   Physical Exam  Resting comfortably on hospital bed  No dysphonia; able to speak in full sentences  Mass along inferior border of parotid gland  Facial movement symmetrical bilaterally  Oral cavity and oropharynx with no erythema or purulence    Significant Labs:  BMP:   Recent Labs   Lab 01/06/24  0426   *      CO2 23   BUN 60*   CREATININE 4.3*   CALCIUM 8.8   MG 2.1     CBC:   Recent Labs    Lab 01/06/24  0426   WBC 8.02   RBC 2.49*   HGB 6.8*   HCT 22.9*      MCV 92   MCH 27.3   MCHC 29.7*     Significant Diagnostics:  I have reviewed all pertinent imaging results/findings within the past 24 hours.  Assessment/Plan:     Right parotid mass  63M with CHF s/p LVAD on 12/1. ENT previously performed bedside injection augmentation of left true vocal fold on 12/19. ENT re-consulted regarding right parotid mass on recent CT imaging. Discussed with patient that this is an incidental finding and likely benign in nature.     -- No acute interventions warranted from ENT  -- Given LVAD status and no plans for heart transplant in the future, would not recommend biopsy or further workup of parotid mass  -- Please Secure Chat me for any questions, page ENT on-call if unresponsive or urgent    Unilateral complete vocal fold paralysis  The patient is a 63 year old male with complex cardiac history including heart failure with recent LVAD placement. He was intubated during this recent admission and has developed left vocal fold immobility causing dysphonia and aspiration of secretions. He is now s/p bedside injection of Restylane into left vocal fold 12/18.    Okay to continue PO trials per SLP  Follow up outpatient with Dr. Hardeep Farmer MD  Otorhinolaryngology-Head & Neck Surgery  [facility] - Cardiology Stepdown

## 2024-01-06 NOTE — PROGRESS NOTES
[facility] - Cardiology Stepdown  Infectious Disease  Progress Note    Patient Name: Radha Abbott  MRN: [id number]  Admission Date: 11/9/2023  Length of Stay: 58 days  Attending Physician: Brayan Ocampo MD  Primary Care Provider: Vasu Kong MD    Isolation Status: No active isolations  Assessment/Plan:      Other  Lymphadenopathy  Radha Abbott is a 63 year old man admitted with heart failure now s/p HM3 on 12/1/23 complicated by coagulopathy and delayed mediastinal closure until 12/4. Has required treatment for possible VAP until 12/14. ID called back for right sided cervical lymphadenopathy and odynophagia. He has a recent sick contact, wife has influenza. Appears to be along his cervical lymph node chain, but would want to ensure we are not missing a phlebitis. Rapid strep negative, throat culture with presumptive pseudomonas. Upper respiratory panel negative. CT neck with LEFT sided cervical lymphadenopathy and parotid mass.   - unclear significance of the pseudomonas culture but given his symptoms can start pip-tazo 4.5 q12 hours for 5 day course, EOC 1/10/24  - continue tamiflu prophylaxis given his recent exposure 30 mg every other day for 1 week      Above discussed with primary team.     Time: 35 minutes   50% of time spent on face-to-face counseling and coordination of care. Counseling included review of test results, diagnosis, and treatment plan with patient and/or family.  I have reviewed hospital notes from VAD service and other specialty providers. I have also reviewed CBC, CMP/BMP,  cultures and imaging with my interpretation as documented.       Anticipated Disposition: TBD    Thank you for your consult. I will follow-up with patient. Please contact us if you have any additional questions.    Xin Brand MD  Infectious Disease  [facility] - [facility]iology Step[facility]    Subjective:     Principal Problem:LVAD (left ventricular assist device) present    HPI:  Mr. Abbott is a 63-year-old man who  underwent destination therapy left ventricular assist device placement.  Postoperatively the chest was left open due to diffuse coagulopathy in RV dysfunction.  He did well post-operatively and underwent washout and closure on 12/4. The patient was extubated but had to be reintubated. A BAL was performed: Gram stain with GNB, culture is NGTD. ID is consulted for BAL results. The patient is agitated but quiets down with reassurance. No fever or chills. Ecgo done this am - results pending. Family at bedside.     Interval History: still with throat pain and sore neck. Has weak cough.     Review of Systems   Constitutional:  Positive for fatigue and fever. Negative for chills.   HENT:  Positive for dental problem and sore throat.    Eyes: Negative.    Respiratory:  Positive for cough and shortness of breath.    Cardiovascular:  Positive for chest pain (sternal).   Gastrointestinal: Negative.    Endocrine: Negative.    Genitourinary: Negative.    Musculoskeletal: Negative.    Skin: Negative.    Allergic/Immunologic: Negative.    Neurological: Negative.    Hematological: Negative.    Psychiatric/Behavioral: Negative.       Objective:     Vital Signs (Most Recent):  Temp: 98.1 °F (36.7 °C) (01/06/24 1200)  Pulse: 87 (01/06/24 1200)  Resp: 18 (01/06/24 1200)  BP: (!) 68/0 (01/06/24 1200)  SpO2: 99 % (01/06/24 1200) Vital Signs (24h Range):  Temp:  [97.4 °F (36.3 °C)-98.1 °F (36.7 °C)] 98.1 °F (36.7 °C)  Pulse:  [] 87  Resp:  [16-19] 18  SpO2:  [97 %-100 %] 99 %  BP: ()/(0-80) 68/0     Weight: 66.8 kg (147 lb 4.3 oz)  Body mass index is 19.97 kg/m².    Estimated Creatinine Clearance: 17 mL/min (A) (based on SCr of 4.3 mg/dL (H)).     Physical Exam  Vitals and nursing note reviewed.   Constitutional:       Appearance: He is ill-appearing.   HENT:      Mouth/Throat:      Mouth: Mucous membranes are moist.      Pharynx: Oropharynx is clear. No oropharyngeal exudate or posterior oropharyngeal erythema.      Comments:  Poor dentition  Pulmonary:      Effort: No respiratory distress.   Abdominal:      General: There is no distension.      Palpations: Abdomen is soft.      Tenderness: There is no abdominal tenderness.   Musculoskeletal:         General: No tenderness.   Lymphadenopathy:      Cervical: Cervical adenopathy (right sided, tender to palpation) present.   Skin:     General: Skin is warm and dry.   Neurological:      Mental Status: He is alert.   Psychiatric:         Mood and Affect: Mood normal.         Behavior: Behavior normal.          Significant Labs:   Microbiology Results (last 7 days)       Procedure Component Value Units Date/Time    Throat culture [[id number]]  (Abnormal) Collected: 01/04/24 1942    Order Status: Completed Specimen: Throat Updated: 01/06/24 0949     RESPIRATORY CULTURE - THROAT PRESUMPTIVE PSEUDOMONAS SPECIES  Many  Identification and susceptibility pending  Normal respiratory bobby also present      Influenza A & B by Molecular [[id number]] Collected: 01/05/24 1607    Order Status: Completed Specimen: Nasopharyngeal Swab Updated: 01/05/24 1647     Influenza A, Molecular Negative     Influenza B, Molecular Negative     Flu A & B Source NP    Group A Strep, Molecular [[id number]] Collected: 01/05/24 1004    Order Status: Completed Specimen: Throat Updated: 01/05/24 1039     Group A Strep, Molecular Negative     Comment: Arcanobacterium haemolyticum and Beta Streptococcus group C   and G will not be detected by this test method.  Please order   Throat Culture ([id number]) if suspected.         Respiratory Infection Panel (PCR), Nasopharyngeal [[id number]] Collected: 01/04/24 1942    Order Status: Completed Specimen: Nasopharyngeal Swab Updated: 01/04/24 2101     Respiratory Infection Panel Source NP Swab     Adenovirus Not Detected     Coronavirus 229E, Common Cold Virus Not Detected     Coronavirus HKU1, Common Cold Virus Not Detected     Coronavirus NL63, Common Cold Virus Not Detected      Coronavirus OC43, Common Cold Virus Not Detected     Comment: The Coronavirus strains detected in this test cause the common cold.  These strains are not the COVID-19 (novel Coronavirus)strain   associated with the respiratory disease outbreak.          SARS-CoV2 (COVID-19) Qualitative PCR Not Detected     Human Metapneumovirus Not Detected     Human Rhinovirus/Enterovirus Not Detected     Influenza A (subtypes H1, H1-2009,H3) Not Detected     Influenza B Not Detected     Parainfluenza Virus 1 Not Detected     Parainfluenza Virus 2 Not Detected     Parainfluenza Virus 3 Not Detected     Parainfluenza Virus 4 Not Detected     Respiratory Syncytial Virus Not Detected     Bordetella Parapertussis ([id number]) Not Detected     Bordetella pertussis (ptxP) Not Detected     Chlamydia pneumoniae Not Detected     Mycoplasma pneumoniae Not Detected    Narrative:      For all other respiratory sources, order [id number] -  Respiratory Viral Panel by PCR            Significant Imaging: I have reviewed all pertinent imaging results/findings within the past 24 hours.

## 2024-01-06 NOTE — PT/OT/SLP PROGRESS
Occupational Therapy   Co-Treatment    Name: Radha Abbott  MRN: [id number]  Admitting Diagnosis:  LVAD (left ventricular assist device) present  33 Days Post-Op    Recommendations:     Discharge Recommendations: High Intensity Therapy  Discharge Equipment Recommendations:   (TBD at next level of care)  Barriers to discharge:  None    Assessment:     Radha Abbott is a 63 y.o. male with a medical diagnosis of LVAD (left ventricular assist device) present.  He presents continued progress towards goals, but not at PLOF.  Performance deficits affecting function are weakness, impaired self care skills, impaired balance, decreased upper extremity function, impaired functional mobility, impaired endurance, gait instability, decreased lower extremity function, impaired fine motor, impaired cardiopulmonary response to activity. Pt benefits from co-treatment with PT to accommodate pt's activity tolerance and progression with therapy.      Rehab Prognosis:  Good; patient would benefit from acute skilled OT services to address these deficits and reach maximum level of function.       Plan:     Patient to be seen 6 x/week to address the above listed problems via self-care/home management, therapeutic activities, therapeutic exercises  Plan of Care Expires: 02/03/24  Plan of Care Reviewed with: patient, sibling    Subjective     Chief Complaint:  denies  Patient/Family Comments/goals: to get better and go home  Pain/Comfort:  Pain Rating 1: 5/10  Location - Side 1: Right  Location - Orientation 1: generalized  Location 1: foot  Pain Addressed 1: Reposition, Distraction  Pain Rating Post-Intervention 1: 5/10    Objective:     Communicated with: RN prior to session.  Patient found supine with LVAD, PICC line, telemetry, NG tube upon OT entry to room.    General Precautions: Standard, LVAD, fall, sternal    Orthopedic Precautions:N/A  Braces: N/A  Respiratory Status: Room air     Occupational Performance:     Bed Mobility:    Patient  completed Scooting/Bridging with contact guard assistance  Patient completed Supine to Sit with minimum assistance     Functional Mobility/Transfers:  Patient completed Sit <> Stand Transfer with contact guard assistance and of 2 persons  with  no assistive device from EOB; Mod A x 2 from b/s chair  Functional Mobility: See PT notes    Activities of Daily Living:  Grooming: stand by assistance in sitting  Upper Body Dressing: moderate assistance for donning of vest  Lower Body Dressing: moderate assistance        AMPAC 6 Click ADL: 12    Treatment & Education:  Pt ed on OT POC  Assisted RN with obtaining pt's weight on scale  Pt/sister participated in continued ed on  LVAD sternal precautions; proper anchor placement;  battery rotation; contents of emergency bag and need for having emergency bag when out of room/out of house; and set-up of vest    Patient left up in chair with all lines intact, call button in reach, RN notified, and sister present    GOALS:   Multidisciplinary Problems       Occupational Therapy Goals          Problem: Occupational Therapy    Goal Priority Disciplines Outcome Interventions   Occupational Therapy Goal     OT, PT/OT Ongoing, Progressing    Description: Goals to be met by: 2/3/24    Patient will increase functional independence with ADLs by performing:    UB Dressing with SBA  LE Dressing with Supervision.  Grooming while standing at sink with Supervision.  Toileting from bedside commode with SBA for hygiene and clothing management.   Step transfer to bedside commode with SBA  [place] with VAD management during ADLs                             Time Tracking:     OT Date of Treatment: 01/06/24  OT Start Time: 0913  OT Stop Time: 0952  OT Total Time (min): 39 min    Billable Minutes:Self Care/Home Management 28  Therapeutic Activity 10          Number of PRIETO visits since last OT visit: 0    1/6/2024

## 2024-01-06 NOTE — PROGRESS NOTES
[facility] - Cardiology Stepdown  Endocrinology  Progress Note    Admit Date: 11/9/2023     Reason for Consult: Management of T2DM, Hyperglycemia     Surgical Procedure and Date: n/a    Diabetes diagnosis year: 1998    Home Diabetes Medications:  Metformin (off since October)   Lab Results   Component Value Date    HGBA1C 7.6 (H) 10/12/2023       How often checking glucose at home?  Once daily in the AM    BG readings on regimen: 150-160s  Hypoglycemia on the regimen?  No  Missed doses on regimen?  n/a    Diabetes Complications include:     Hyperglycemia    Complicating diabetes co morbidities:   CAD s/p CABG, HTN, HLD      HPI:   Patient is a 63 y.o. male with a diagnosis of CAD s/p 3v CABG (unclear anatomy, 2009), ICM with a recent EF of 15-20% s/p ICD (medtronik 2009), DM2 (a1c 7.7), HTN, HLD, Vfib on amio who presents to the ED with CC of SOB. In the ED he was AF with stable VS on RA.  CBC showing chronic anemia.  CMP notable for acute on chronic CKD with baseline around 2.1 and Cr now 2.6.  BNP elevated.  Lactate neg.  CXR showing pulmonary edema. He was subsequently admitted to the CCU for diuresis.  Endocrinology consulted for management of T2DM.          Interval HPI:   Overnight events: No acute events overnight. Patient in room 312/312 A. Blood glucose stable. BG at and above goal on current insulin regimen (Schedule Insulin and SSI (TPN/TF)). Steroid use- None. 33 Days Post-Op  Renal function- Abnormal - Creatine 4.3    Vasopressors-  None       Endocrine will continue to follow and manage insulin orders inpatient.         Diet Minced & Moist (IDDSI Level 5) Honey Thick (Moderately Thick)     Eating:   <25%  Nausea: No  Hypoglycemia and intervention: No  Fever: No  TPN and/or TF: Yes  If yes, type of TF/TPN and rate: TF @ 45 ml/hr    BP (!) 87/66 (BP Location: Left arm, Patient Position: Lying)   Pulse 81   Temp 97.7 °F (36.5 °C) (Oral)   Resp 18   Ht 6' (1.829 m)   Wt 66.8 kg (147 lb 4.3 oz)   SpO2  "98%   BMI 19.97 kg/m²     Labs Reviewed and Include    Recent Labs   Lab 01/06/24  0426   *   CALCIUM 8.8   ALBUMIN 2.0*   PROT 7.3      K 4.1   CO2 23      BUN 60*   CREATININE 4.3*   ALKPHOS 181*   ALT 38   AST 56*   BILITOT 0.3     Lab Results   Component Value Date    WBC 8.02 01/06/2024    HGB 6.8 (L) 01/06/2024    HCT 22.9 (L) 01/06/2024    MCV 92 01/06/2024     01/06/2024     No results for input(s): "TSH", "FREET4" in the last 168 hours.  Lab Results   Component Value Date    HGBA1C 7.6 (H) 10/12/2023       Nutritional status:   Body mass index is 19.97 kg/m².  Lab Results   Component Value Date    ALBUMIN 2.0 (L) 01/06/2024    ALBUMIN 2.2 (L) 01/05/2024    ALBUMIN 1.9 (L) 01/04/2024     Lab Results   Component Value Date    PREALBUMIN 35 01/05/2024    PREALBUMIN 30 01/03/2024    PREALBUMIN 29 01/01/2024       Estimated Creatinine Clearance: 17 mL/min (A) (based on SCr of 4.3 mg/dL (H)).    Accu-Checks  Recent Labs     01/04/24  2334 01/05/24  0403 01/05/24  0817 01/05/24  1149 01/05/24  1643 01/05/24  1946 01/05/24  2337 01/06/24  0454 01/06/24  0821 01/06/24  0851   POCTGLUCOSE 149* 121* 121* 146* 117* 169* 120* 211* 222* 192*       Current Medications and/or Treatments Impacting Glycemic Control  Immunotherapy:    Immunosuppressants       None          Steroids:   Hormones (From admission, onward)      None          Pressors:    Autonomic Drugs (From admission, onward)      None          Hyperglycemia/Diabetes Medications:   Antihyperglycemics (From admission, onward)      Start     Stop Route Frequency Ordered    01/05/24 0800  insulin aspart U-100 pen 6 Units         -- SubQ Every 24 hours (non-standard times) 01/04/24 1214    01/05/24 0400  insulin aspart U-100 pen 4 Units         -- SubQ Every 24 hours (non-standard times) 01/04/24 1214    01/05/24 0000  insulin aspart U-100 pen 4 Units         -- SubQ Every 24 hours (non-standard times) 01/04/24 1214    01/04/24 2000  " insulin aspart U-100 pen 4 Units         -- SubQ Every 24 hours (non-standard times) 01/04/24 1214    01/04/24 1600  insulin aspart U-100 pen 6 Units         -- SubQ Every 24 hours (non-standard times) 01/04/24 1214    01/04/24 1213  insulin aspart U-100 pen 6 Units         -- SubQ Every 24 hours (non-standard times) 01/04/24 1214    12/31/23 1108  insulin aspart U-100 pen 0-5 Units         -- SubQ Every 4 hours PRN 12/31/23 1008            ASSESSMENT and PLAN    Cardiac/Vascular  * LVAD (left ventricular assist device) present  Optimize BG control to improve wound healing        PAF (paroxysmal atrial fibrillation)  May increase insulin resistance.         Acute on chronic combined systolic and diastolic CHF, NYHA class 4  Managed per primary team  Optimize BG control  S/p LVAD     Renal/  Acute renal failure with acute tubular necrosis superimposed on stage 3b chronic kidney disease  Lab Results   Component Value Date    CREATININE 4.3 (H) 01/06/2024     Avoid insulin stacking  Titrate insulin slowly       Endocrine  Type 2 diabetes mellitus without complication, without long-term current use of insulin  BG goal 140-180. TF @ 45 ml/hr.     - Novolog 6 units during 0800, 1200, and 1600 BG checks to cover additional intake. -hold if TF held or BG < 100   - Novolog 4 units 2000, 0000, 0400 to cover TF- hold if TF held or BG < 100   - LDC (150/50) prn q4hr   - POCT Glucose every 4 hours  - Hypoglycemia protocol in place      ** Please notify Endocrine for any change and/or advance in diet**  ** Please call Endocrine for any BG related issues **     Discharge Planning:   TBD. Please notify endocrinology prior to discharge.                  Erasmo Parrish, DNP, FNP  Endocrinology  [facility] - Cardiology Stepdown

## 2024-01-06 NOTE — NURSING
Plan of care discussed with patient and family. Patient rolls independently, turned Q 2 hrs.Continuing to encourage sternal precautions, IS, and ambulation. LVAD DP and numbers WNL, smooth LVAD hum. No LFA. Patient has no complaints of pain. MSI cleansed and painted with betadine, covered with island border, CDI. Wound care orders Q shift completed for sacral wound and right heel wound. Discussed medications and care. NG tube placement confirmed by X-ray, continued tube feedings @45 mL/hr, tolerating well. Patient has no questions at this time.

## 2024-01-06 NOTE — PROGRESS NOTES
01/06/2024  [place] [facility]    Current provider:  Brayan Ocampo MD    Device interrogation:      1/6/2024     4:03 PM 1/6/2024     4:02 PM 1/6/2024    12:00 PM 1/6/2024     8:00 AM 1/6/2024     4:00 AM 1/6/2024    12:00 AM 1/5/2024    10:00 PM   TXP LVAD INTERROGATIONS   Type  HeartMate3 HeartMate3 HeartMate3      Flow  3.8 4.3 4.1 3.8 3.9 3.8   Speed  4900 4900 4900 4900 4900 4900   PI  4.6 3.3 4.2 5.1 5.1 5   Power ([place])  3.3 3.3 3.3 3.3 3.3 3.2   LSL  4500 4500 4500 4500 4500    Low Flow Alarm  no        Pulsatility Pulse  Intermittent pulse Pulse             Rounded on Ledric Abbott to ensure all mechanical assist device settings (IABP or VAD) were appropriate and all parameters were within limits.  I was able to ensure all back up equipment was present, the staff had no issues, and the Perfusion Department daily rounding was complete.      For implantable VADs: Interrogation of Ventricular assist device was performed with analysis of device parameters and review of device function. I have personally reviewed the interrogation findings and agree with findings as stated.     In emergency, the nursing units have been notified to contact the perfusion department either by:  Calling [phone] from 630am to 4pm Mon thru Fri, utilizing the On-Call Finder functionality of Epic and searching for Perfusion, or by contacting the hospital  from 4pm to 630am and on weekends and asking to speak with the perfusionist on call.    4:49 PM

## 2024-01-06 NOTE — PROGRESS NOTES
[facility]  Nephrology  Progress Note    Date of Admit: 11/9/2023          Subjective:      HPI: Mr. Abbott is a 63-year-old man with ischemic cardiomyopathy with most recent TTE showing EF 10 -15% and G3DD s/p [employer] ICM placement on chronic dobutamine infusion, CAD s/p x3 vessel CABG back in 2009, type 2 diabetes mellitus (most recent hemoglobin A1c 7.6%), hypertension, HLD, history of ventricular fibrillation for which he is on amiodarone and CKD IIIb (baseline creatinine ~2-2.2) who was initially admitted on 11/9 with heart failure exacerbation and hypervolemia for which he was managed with inotrope with dobutamine in addition to IV diuresis. He ultimately underwent IABP placed on 11/21 for mechanical hemodynamic support and subsequently underwent LVAD placement on 12/1. His renal function appears to be mostly stable with IVÁN and creatinine in mid 2s to 3s in addition he is still making urine with Lasix infusion however on 12/5 Nephrology consulted given concern for uremia with BUN 72.      1/6/24: no acute distress, NGT in and on TF, brother at bedside. + fatigue and tiredness, no CP,SOB,N/V.       Past Medical History:  Past Medical History:   Diagnosis Date    CAD (coronary artery disease)     Diabetes mellitus     HFrEF (heart failure with reduced ejection fraction)     ICD (implantable cardioverter-defibrillator) in place     MI, old        Past Surgical History:  Past Surgical History:   Procedure Laterality Date    ANGIOPLASTY-VENOUS ARTERY Right 12/1/2023    Procedure: ANGIOPLASTY-VENOUS ARTERY, RIGHT FEMORAL;  Surgeon: Yuri Washington MD;  Location: [facility] OR [address] [place] [address];  Service: Cardiovascular;  Laterality: Right;    AORTIC VALVULOPLASTY N/A 12/1/2023    Procedure: REPAIR, AORTIC VALVE;  Surgeon: Yuri Washington MD;  Location: [facility] OR [address] [place] [address];  Service: Cardiovascular;  Laterality: N/A;    CARDIAC SURGERY      CLOSURE N/A 12/1/2023    Procedure: CLOSURE, TEMPORARY;  Surgeon: Padmini  MD Yuri;  Location: [facility] OR [facility] FLR;  Service: Cardiovascular;  Laterality: N/A;    DRAINAGE OF PLEURAL EFFUSION  12/4/2023    Procedure: DRAINAGE, PLEURAL EFFUSION;  Surgeon: Yuri Washington MD;  [facility];  Service: Cardiovascular;;    INSERTION OF GRAFT TO PERICARDIUM  12/4/2023    Procedure: INSERTION, GRAFT, PERICARDIUM;  Surgeon: Yuri Washington MD;  [facility];  Service: Cardiovascular;;    INSERTION OF INTRA-AORTIC BALLOON ASSIST DEVICE Right 11/21/2023    Procedure: INSERTION, INTRA-AORTIC BALLOON PUMP;  Surgeon: Finn Cohn MD;  Location: [facility] CATH LAB;  Service: Cardiology;  Laterality: Right;    LEFT VENTRICULAR ASSIST DEVICE Left 12/1/2023    Procedure: INSERTION-LEFT VENTRICULAR ASSIST DEVICE;  Surgeon: Yuri Washington MD;  [facility];  Service: Cardiovascular;  Laterality: Left;  REDO STERNOTOMY - REDO SAW NEEDED FOR CASE    LYSIS OF ADHESIONS  12/1/2023    Procedure: LYSIS, ADHESIONS;  Surgeon: Yuri Washington MD;  [facility];  Service: Cardiovascular;;    PLACEMENT OF SWAN ROLANDO CATHETER WITH IMAGING GUIDANCE  11/20/2023    Procedure: INSERTION, CATHETER, SWAN-ROLANDO, WITH IMAGING GUIDANCE;  Surgeon: Sajan Hurley MD;  Location: [facility] CATH LAB;  Service: Cardiology;;    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 12/1/2023    Procedure: REPAIR, ANEURYSM, ARTERY, FEMORAL;  Surgeon: Yuri Washington MD;  [facility];  Service: Cardiovascular;  Laterality: Right;  Right Femoral Artery Repair    RIGHT HEART CATHETERIZATION Right 10/10/2023    Procedure: INSERTION, CATHETER, RIGHT HEART;  Surgeon: Bin Gandhi MD;  Location: [facility] CATH LAB;  Service: Cardiology;  Laterality: Right;    RIGHT HEART CATHETERIZATION Right 10/13/2023    Procedure: INSERTION, CATHETER, RIGHT HEART;  Surgeon: Walter Mcintyre MD;  Location: [facility] CATH LAB;  Service: Cardiology;  Laterality: Right;    RIGHT HEART CATHETERIZATION   11/13/2023    RIGHT HEART CATHETERIZATION Right 11/13/2023    Procedure: INSERTION, CATHETER, RIGHT HEART;  Surgeon: Juventino Bermudez Jr., MD;  Location: [facility] CATH LAB;  Service: Cardiology;  Laterality: Right;    RIGHT HEART CATHETERIZATION Right 11/20/2023    Procedure: INSERTION, CATHETER, RIGHT HEART;  Surgeon: Sajan Hurley MD;  Location: [facility] CATH LAB;  Service: Cardiology;  Laterality: Right;    STERNAL WOUND CLOSURE N/A 12/4/2023    Procedure: CLOSURE, WOUND, STERNUM;  Surgeon: Yuri Washington MD;  Location: [facility] OR [facility] FLR;  Service: Cardiovascular;  Laterality: N/A;    STERNOTOMY N/A 12/1/2023    Procedure: STERNOTOMY, REDO;  Surgeon: Yuri Washington MD;  Location: [facility] OR [facility] FLR;  Service: Cardiovascular;  Laterality: N/A;    VALVULOPLASTY, MITRAL VALVE N/A 12/1/2023    Procedure: VALVULOPLASTY, MITRAL VALVE;  Surgeon: Yuri Washington MD;  [facility];  Service: Cardiovascular;  Laterality: N/A;       Allergies:  Review of patient's allergies indicates:  No Known Allergies    Home Medications:  Prior to Admission medications    Medication Sig Start Date End Date Taking? Authorizing Provider   amiodarone (PACERONE) 400 MG tablet Take 1 tablet (400 mg total) by mouth once daily. 10/22/23 10/21/24 Yes Jimy An PA-C   apixaban (ELIQUIS) 5 mg Tab Take 1 tablet (5 mg total) by mouth 2 (two) times daily. 10/16/23  Yes Jimy An PA-C   aspirin (ECOTRIN) 81 MG EC tablet Take 81 mg by mouth once daily.   Yes Provider, Historical   atorvastatin (LIPITOR) 40 MG tablet Take 40 mg by mouth. 9/28/23  Yes Provider, Historical   DOBUTamine (DOBUTREX) 1,000 mg/250 mL (4,000 mcg/mL) infusion Inject 389.5 mcg/min into the vein continuous. 10/20/23  Yes Jimy An PA-C   fish oil-omega-3 fatty acids 300-1,000 mg capsule Take 2 g by mouth once daily.   Yes Provider, Historical   furosemide (LASIX) 40 MG tablet Take 1 tablet (40 mg total) by mouth 2 (two) times daily. 10/31/23  10/30/24 Yes Nic Mares MD   gabapentin (NEURONTIN) 300 MG capsule Take 300 mg by mouth nightly as needed. 23  Yes Provider, Historical   multivitamin capsule Take 1 capsule by mouth once daily.   Yes Provider, Historical   pantoprazole (PROTONIX) 40 MG tablet Take 1 tablet (40 mg total) by mouth before breakfast. 23 Yes Samra López MD   potassium chloride SA (K-DUR,KLOR-CON) 20 MEQ tablet Take 1 tablet (20 mEq total) by mouth once daily. 10/23/23  Yes Jimy An PA-C   sacubitriL-valsartan (ENTRESTO) 24-26 mg per tablet Take 1 tablet by mouth 2 (two) times daily. 10/31/23  Yes Nic Mares MD   metOLazone (ZAROXOLYN) 2.5 MG tablet Take 1 tablet (2.5 mg total) by mouth once daily.  and Saturday 11/10 11/8/23 11/7/24  Walter Mcintyre MD   torsemide (DEMADEX) 20 MG Tab Take 2 tablets (40 mg total) by mouth 2 (two) times a day. 23  Walter Mcintyre MD       Family History:  History reviewed. No pertinent family history.    Social History:  Social History     Tobacco Use    Smoking status: Every Day     Current packs/day: 0.50     Types: Cigarettes   Substance Use Topics    Alcohol use: Yes     Comment: rarely    Drug use: No       Review of Systems:  Pertinent positives and negatives are listed in HPI.  All other systems are reviewed and are negative.     Objective:   Last 24 Hour Vital Signs:  BP  Min: 68/0  Max: 110/80  Temp  Av.8 °F (36.6 °C)  Min: 97.4 °F (36.3 °C)  Max: 98.1 °F (36.7 °C)  Pulse  Av.3  Min: 50  Max: 105  Resp  Av.9  Min: 16  Max: 19  SpO2  Av.5 %  Min: 97 %  Max: 100 %  Weight  Av.8 kg (147 lb 4.3 oz)  Min: 66.8 kg (147 lb 4.3 oz)  Max: 66.8 kg (147 lb 4.3 oz)  Body mass index is 19.97 kg/m².  I/O last 3 completed shifts:  In: 1301 [P.O.:120; NG/GT:1181]  Out: 500 [Urine:350; Drains:150]    Physical Examination:  General: No acute distress, but ill appearing.   HEENT: EOMI, PERRL, MMM, OP clear and without exudate  or erythema  Cardiovascular: NRRR, without appreciated murmurs or rubs, without extra heart sounds, peripheral pulses 2+ throughout  Chest/Pulmonary: decrease breath sound bibasilar, normal work of breathing without accessory muscle use,   Abdomen: soft, nontender, nondistended, bowel sounds heard in all four quadrants and normo-active   MSKL: without gross deformity, active FROM  Lymphatic: no appreciated LAD  Skin: without cyanosis or clubbing, without peripheral edema       Laboratory:  Most Recent Data:  CBC:   Lab Results   Component Value Date    WBC 8.02 01/06/2024    HGB 6.8 (L) 01/06/2024    HCT 22.9 (L) 01/06/2024     01/06/2024    MCV 92 01/06/2024    RDW 16.2 (H) 01/06/2024     BMP:   Lab Results   Component Value Date     01/06/2024    K 4.1 01/06/2024     01/06/2024    CO2 23 01/06/2024    BUN 60 (H) 01/06/2024    CREATININE 4.3 (H) 01/06/2024     (H) 01/06/2024    CALCIUM 8.8 01/06/2024    MG 2.1 01/06/2024    PHOS 2.7 01/06/2024     LFTs:   Lab Results   Component Value Date    PROT 7.3 01/06/2024    ALBUMIN 2.0 (L) 01/06/2024    BILITOT 0.3 01/06/2024    AST 56 (H) 01/06/2024    ALKPHOS 181 (H) 01/06/2024    ALT 38 01/06/2024     Coags:   Lab Results   Component Value Date    INR 2.3 (H) 01/06/2024     Urinalysis:   Lab Results   Component Value Date    COLORU Yellow 11/15/2023    SPECGRAV 1.015 11/15/2023    NITRITE Negative 11/15/2023    KETONESU Negative 11/15/2023    UROBILINOGEN Negative 10/08/2023    WBCUA 9 (H) 11/15/2023       Trended Lab Data:  Recent Labs   Lab 01/04/24  0453 01/05/24  1059 01/06/24  0426   WBC 6.99 7.21 8.02   HGB 6.5* 7.6* 6.8*   HCT 19.8* 24.9* 22.9*    484* 450   MCV 87 92 92   RDW 16.6* 16.3* 16.2*   * 142 136   K 4.5 4.3 4.1    100 100   CO2 23 23 23   BUN 74* 46* 60*   CREATININE 4.4* 3.6* 4.3*   * 113* 136*   PROT 6.9 8.0 7.3   ALBUMIN 1.9* 2.2* 2.0*   BILITOT 0.3 0.4 0.3   AST 52* 68* 56*   ALKPHOS 181* 181* 181*    ALT 29 41 38       Trended Cardiac Data:  Recent Labs   Lab 01/03/24  0500 01/05/24  1059   * 670*              Assessment and Plan:       Renal/  Acute renal failure with acute tubular necrosis superimposed on stage 3b chronic kidney disease  - suspect acute tubular injury secondary to hypotension/cardiogenic shock likely compounded by intra-arterial contrast and anemia with urinary sediment with granular casts  -no urgent need for RRT. And will reevaluate daily.   -strict Is and Os and daily wt.

## 2024-01-06 NOTE — SUBJECTIVE & OBJECTIVE
Interval History: still with throat pain and sore neck. Has weak cough.     Review of Systems   Constitutional:  Positive for fatigue and fever. Negative for chills.   HENT:  Positive for dental problem and sore throat.    Eyes: Negative.    Respiratory:  Positive for cough and shortness of breath.    Cardiovascular:  Positive for chest pain (sternal).   Gastrointestinal: Negative.    Endocrine: Negative.    Genitourinary: Negative.    Musculoskeletal: Negative.    Skin: Negative.    Allergic/Immunologic: Negative.    Neurological: Negative.    Hematological: Negative.    Psychiatric/Behavioral: Negative.       Objective:     Vital Signs (Most Recent):  Temp: 98.1 °F (36.7 °C) (01/06/24 1200)  Pulse: 87 (01/06/24 1200)  Resp: 18 (01/06/24 1200)  BP: (!) 68/0 (01/06/24 1200)  SpO2: 99 % (01/06/24 1200) Vital Signs (24h Range):  Temp:  [97.4 °F (36.3 °C)-98.1 °F (36.7 °C)] 98.1 °F (36.7 °C)  Pulse:  [] 87  Resp:  [16-19] 18  SpO2:  [97 %-100 %] 99 %  BP: ()/(0-80) 68/0     Weight: 66.8 kg (147 lb 4.3 oz)  Body mass index is 19.97 kg/m².    Estimated Creatinine Clearance: 17 mL/min (A) (based on SCr of 4.3 mg/dL (H)).     Physical Exam  Vitals and nursing note reviewed.   Constitutional:       Appearance: He is ill-appearing.   HENT:      Mouth/Throat:      Mouth: Mucous membranes are moist.      Pharynx: Oropharynx is clear. No oropharyngeal exudate or posterior oropharyngeal erythema.      Comments: Poor dentition  Pulmonary:      Effort: No respiratory distress.   Abdominal:      General: There is no distension.      Palpations: Abdomen is soft.      Tenderness: There is no abdominal tenderness.   Musculoskeletal:         General: No tenderness.   Lymphadenopathy:      Cervical: Cervical adenopathy (right sided, tender to palpation) present.   Skin:     General: Skin is warm and dry.   Neurological:      Mental Status: He is alert.   Psychiatric:         Mood and Affect: Mood normal.         Behavior:  Behavior normal.          Significant Labs:   Microbiology Results (last 7 days)       Procedure Component Value Units Date/Time    Throat culture [[id number]]  (Abnormal) Collected: 01/04/24 1942    Order Status: Completed Specimen: Throat Updated: 01/06/24 0949     RESPIRATORY CULTURE - THROAT PRESUMPTIVE PSEUDOMONAS SPECIES  Many  Identification and susceptibility pending  Normal respiratory bobby also present      Influenza A & B by Molecular [[id number]] Collected: 01/05/24 1607    Order Status: Completed Specimen: Nasopharyngeal Swab Updated: 01/05/24 1647     Influenza A, Molecular Negative     Influenza B, Molecular Negative     Flu A & B Source NP    Group A Strep, Molecular [[id number]] Collected: 01/05/24 1004    Order Status: Completed Specimen: Throat Updated: 01/05/24 1039     Group A Strep, Molecular Negative     Comment: Arcanobacterium haemolyticum and Beta Streptococcus group C   and G will not be detected by this test method.  Please order   Throat Culture ([id number]) if suspected.         Respiratory Infection Panel (PCR), Nasopharyngeal [[id number]] Collected: 01/04/24 1942    Order Status: Completed Specimen: Nasopharyngeal Swab Updated: 01/04/24 2101     Respiratory Infection Panel Source NP Swab     Adenovirus Not Detected     Coronavirus 229E, Common Cold Virus Not Detected     Coronavirus HKU1, Common Cold Virus Not Detected     Coronavirus NL63, Common Cold Virus Not Detected     Coronavirus OC43, Common Cold Virus Not Detected     Comment: The Coronavirus strains detected in this test cause the common cold.  These strains are not the COVID-19 (novel Coronavirus)strain   associated with the respiratory disease outbreak.          SARS-CoV2 (COVID-19) Qualitative PCR Not Detected     Human Metapneumovirus Not Detected     Human Rhinovirus/Enterovirus Not Detected     Influenza A (subtypes H1, H1-2009,H3) Not Detected     Influenza B Not Detected     Parainfluenza Virus 1 Not Detected      Parainfluenza Virus 2 Not Detected     Parainfluenza Virus 3 Not Detected     Parainfluenza Virus 4 Not Detected     Respiratory Syncytial Virus Not Detected     Bordetella Parapertussis (TS1069) Not Detected     Bordetella pertussis (ptxP) Not Detected     Chlamydia pneumoniae Not Detected     Mycoplasma pneumoniae Not Detected    Narrative:      For all other respiratory sources, order [id number] -  Respiratory Viral Panel by PCR            Significant Imaging: I have reviewed all pertinent imaging results/findings within the past 24 hours.

## 2024-01-06 NOTE — SUBJECTIVE & OBJECTIVE
"Interval HPI:   Overnight events: No acute events overnight. Patient in room 312/312 A. Blood glucose stable. BG at and above goal on current insulin regimen (Schedule Insulin and SSI (TPN/TF)). Steroid use- None. 33 Days Post-Op  Renal function- Abnormal - Creatine 4.3    Vasopressors-  None       Endocrine will continue to follow and manage insulin orders inpatient.         Diet Minced & Moist (IDDSI Level 5) Honey Thick (Moderately Thick)     Eating:   <25%  Nausea: No  Hypoglycemia and intervention: No  Fever: No  TPN and/or TF: Yes  If yes, type of TF/TPN and rate: TF @ 40 ml/hr    BP (!) 87/66 (BP Location: Left arm, Patient Position: Lying)   Pulse 81   Temp 97.7 °F (36.5 °C) (Oral)   Resp 18   Ht 6' (1.829 m)   Wt 66.8 kg (147 lb 4.3 oz)   SpO2 98%   BMI 19.97 kg/m²     Labs Reviewed and Include    Recent Labs   Lab 01/06/24  0426   *   CALCIUM 8.8   ALBUMIN 2.0*   PROT 7.3      K 4.1   CO2 23      BUN 60*   CREATININE 4.3*   ALKPHOS 181*   ALT 38   AST 56*   BILITOT 0.3     Lab Results   Component Value Date    WBC 8.02 01/06/2024    HGB 6.8 (L) 01/06/2024    HCT 22.9 (L) 01/06/2024    MCV 92 01/06/2024     01/06/2024     No results for input(s): "TSH", "FREET4" in the last 168 hours.  Lab Results   Component Value Date    HGBA1C 7.6 (H) 10/12/2023       Nutritional status:   Body mass index is 19.97 kg/m².  Lab Results   Component Value Date    ALBUMIN 2.0 (L) 01/06/2024    ALBUMIN 2.2 (L) 01/05/2024    ALBUMIN 1.9 (L) 01/04/2024     Lab Results   Component Value Date    PREALBUMIN 35 01/05/2024    PREALBUMIN 30 01/03/2024    PREALBUMIN 29 01/01/2024       Estimated Creatinine Clearance: 17 mL/min (A) (based on SCr of 4.3 mg/dL (H)).    Accu-Checks  Recent Labs     01/04/24  2334 01/05/24  0403 01/05/24  0817 01/05/24  1149 01/05/24  1643 01/05/24  1946 01/05/24  2337 01/06/24  0454 01/06/24  0821 01/06/24  0851   POCTGLUCOSE 149* 121* 121* 146* 117* 169* 120* 211* 222* " 192*       Current Medications and/or Treatments Impacting Glycemic Control  Immunotherapy:    Immunosuppressants       None          Steroids:   Hormones (From admission, onward)      None          Pressors:    Autonomic Drugs (From admission, onward)      None          Hyperglycemia/Diabetes Medications:   Antihyperglycemics (From admission, onward)      Start     Stop Route Frequency Ordered    01/05/24 0800  insulin aspart U-100 pen 6 Units         -- SubQ Every 24 hours (non-standard times) 01/04/24 1214    01/05/24 0400  insulin aspart U-100 pen 4 Units         -- SubQ Every 24 hours (non-standard times) 01/04/24 1214    01/05/24 0000  insulin aspart U-100 pen 4 Units         -- SubQ Every 24 hours (non-standard times) 01/04/24 1214    01/04/24 2000  insulin aspart U-100 pen 4 Units         -- SubQ Every 24 hours (non-standard times) 01/04/24 1214    01/04/24 1600  insulin aspart U-100 pen 6 Units         -- SubQ Every 24 hours (non-standard times) 01/04/24 1214    01/04/24 1213  insulin aspart U-100 pen 6 Units         -- SubQ Every 24 hours (non-standard times) 01/04/24 1214    12/31/23 1108  insulin aspart U-100 pen 0-5 Units         -- SubQ Every 4 hours PRN 12/31/23 1008

## 2024-01-06 NOTE — PROGRESS NOTES
"   01/05/24 1802        VAD 12/01/23 1015 Left ventricular assist device HeartMate 3   Placement Date/Time: 12/01/23 1015   Present Prior to Hospital Arrival?: No  VAD Type: Left ventricular assist device  VAD Brand: HeartMate 3   Site Location Abdomen right   Site Assessment Clean;Dry;Intact   Driveline Exit Site 2   Driveline Assessment Free of Kinks;Intact   Dressing Status Clean;Dry;Intact   Dressing Intervention Sterile dressing change   Performed By RN   Dressing Change Schedule Every 3 days   Dressing Change Due 01/08/24   Integrity dry;intact   Driveline Gra[place] in use [place] carranza   Condition CDI   Post Removal Complications None     LVAD dressing change completed using sterile technique with silver kit. DLES is a "2" with minimal drainage noted on the drain sponge. Tolerated without any complication. Sutures remain intact, no redness, or tenderness noted.   "

## 2024-01-06 NOTE — SUBJECTIVE & OBJECTIVE
Interval History: ENT re-consulted regarding new incidental finding of right parotid mass. Per chart review, he developed right neck swelling and odynophagia. Respiratory cultures with preliminary pseudomonas growth. A CT neck was obtained which showed an incidental mass of the right parotid gland. ENT consulted for further evaluation. Patient states he started feeling the right parotid mass a few days ago. Denies any fluctuating swelling on the right. No history of head and neck cancer or head and neck radiation. Tolerating PO. Awaiting placement at this time.    Medications:  Continuous Infusions:   sodium chloride 0.9%      dextrose 10 % in water (D10W)       Scheduled Meds:   acetaminophen  1,000 mg Per NG tube TID    amiodarone  400 mg Per NG tube Daily    balsam peru-castor oiL   Topical (Top) BID    cholecalciferol (vitamin D3)  2,000 Units Per NG tube Daily    epoetin zohaib (PROCRIT) injection  50 Units/kg Subcutaneous Every Tues, Thurs, Sat    famotidine  20 mg Per G Tube Daily    gabapentin  125 mg Per NG tube Q12H    insulin aspart U-100  4 Units Subcutaneous Q24H    insulin aspart U-100  4 Units Subcutaneous Q24H    insulin aspart U-100  4 Units Subcutaneous Q24H    insulin aspart U-100  6 Units Subcutaneous Q24H    insulin aspart U-100  6 Units Subcutaneous Q24H    insulin aspart U-100  6 Units Subcutaneous Q24H    oseltamivir  30 mg Oral Every Tues, Thurs, Sat    piperacillin-tazobactam (Zosyn) IV (PEDS and ADULTS) (extended infusion is not appropriate)  4.5 g Intravenous Q12H    polyethylene glycol  17 g Per NG tube Daily    psyllium husk (aspartame)  1 packet Per NG tube Daily    senna-docusate 8.6-50 mg  2 tablet Per NG tube Daily    traMADoL  50 mg Oral Once    traZODone  50 mg Per NG tube QHS    venlafaxine  37.5 mg Per G Tube Daily    warfarin  2.5 mg Per G Tube Every Mon, Wed, Fri    warfarin  5 mg Per G Tube Every Tues, Thurs, Sat, Sun     PRN Meds:bisacodyL, dextrose 10 % in water (D10W),  dextrose 10%, dextrose 10%, dextrose 10%, dextrose 10%, glucagon (human recombinant), heparin (porcine), hydrALAZINE, insulin aspart U-100, ondansetron, Flushing PICC/Midline Protocol **AND** [DISCONTINUED] sodium chloride 0.9% **AND** sodium chloride 0.9%, traMADoL, zolpidem     Review of patient's allergies indicates:  No Known Allergies  Objective:     Vital Signs (24h Range):  Temp:  [97.4 °F (36.3 °C)-98.1 °F (36.7 °C)] 98.1 °F (36.7 °C)  Pulse:  [] 62  Resp:  [16-19] 18  SpO2:  [97 %-100 %] 99 %  BP: ()/(0-80) 68/0     Date 01/06/24 0700 - 01/07/24 0659   Shift 6136-9938 7121-3138 4703-7414 24 Hour Total   INTAKE   NG/GT 50   50   Shift Total(mL/kg) 50(0.7)   50(0.7)   OUTPUT   Shift Total(mL/kg)       Weight (kg) 66.8 66.8 66.8 66.8     Lines/Drains/Airways       Peripherally Inserted Central Catheter Line  Duration             PICC Triple Lumen 12/05/23 1208 right basilic 32 days              Central Venous Catheter Line  Duration                  Hemodialysis Catheter 12/22/23 1024 right internal jugular 15 days              Drain  Duration                  NG/OG Tube 01/04/24 0104 Left nostril 2 days              Line  Duration                  VAD 12/01/23 1015 Left ventricular assist device HeartMate 3 36 days                   Physical Exam  Resting comfortably on hospital bed  No dysphonia; able to speak in full sentences  Mass along inferior border of parotid gland  Facial movement symmetrical bilaterally  Oral cavity and oropharynx with no erythema or purulence    Significant Labs:  BMP:   Recent Labs   Lab 01/06/24  0426   *      CO2 23   BUN 60*   CREATININE 4.3*   CALCIUM 8.8   MG 2.1     CBC:   Recent Labs   Lab 01/06/24  0426   WBC 8.02   RBC 2.49*   HGB 6.8*   HCT 22.9*      MCV 92   MCH 27.3   MCHC 29.7*     Significant Diagnostics:  I have reviewed all pertinent imaging results/findings within the past 24 hours.

## 2024-01-06 NOTE — ASSESSMENT & PLAN NOTE
Radha Abbott is a 63 year old man admitted with heart failure now s/p HM3 on 12/1/23 complicated by coagulopathy and delayed mediastinal closure until 12/4. Has required treatment for possible VAP until 12/14. ID called back for right sided cervical lymphadenopathy and odynophagia. He has a recent sick contact, wife has influenza. Appears to be along his cervical lymph node chain, but would want to ensure we are not missing a phlebitis. Rapid strep negative, throat culture with presumptive pseudomonas. Upper respiratory panel negative. CT neck with LEFT sided cervical lymphadenopathy and parotid mass.   - unclear significance of the pseudomonas culture but given his symptoms can start pip-tazo 4.5 q12 hours for 5 day course, EOC 1/10/24  - continue tamiflu prophylaxis given his recent exposure 30 mg every other day for 1 week

## 2024-01-06 NOTE — PT/OT/SLP PROGRESS
Physical Therapy Co-Treatment    Patient Name:  Radha Abbott   MRN:  [id number]  Admitting Diagnosis:  LVAD (left ventricular assist device) present   Recent Surgery: Procedure(s) (LRB):  CLOSURE, WOUND, STERNUM (N/A)  INSERTION, GRAFT, PERICARDIUM  DRAINAGE, PLEURAL EFFUSION 33 Days Post-Op  Admit Date: 11/9/2023  Length of Stay: 58 days    Recommendations:     Discharge Recommendations: High Intensity Therapy  Discharge Equipment Recommendations: to be determined by next level of care   Barriers to discharge: None    Appropriate transfer level with nursing staff: bed <> chair assist x 2 for safety    Plan:     During this hospitalization, patient to be seen 6 x/week to address the identified rehab impairments via gait training, therapeutic activities, therapeutic exercises, neuromuscular re-education and progress towards the established goals.  Plan of Care Expires:  02/05/24  Plan of Care Reviewed with: patient, sibling    Assessment     Radha Abbott is a 63 y.o. male admitted with a medical diagnosis of LVAD (left ventricular assist device) present. Pt tolerated session well today. He showed good progress towards goals on this date. He showed good improvements in strength of BLE As well as activity tolerance. This is seen by increased TOTAL distance ambulated as well as increased ambulation distance per gait trial. He presented motivated and excited with ambulation progress. Pt will continue to benefit from skilled PT services during this hospital admit to continue to improve transfer ability and efficiency as well as continue to progress pt's ambulation distance and cardiopulmonary endurance to maximize pt's functional independence and return to PLOF. Patient has demonstrated sufficient progression to warrant high intensity therapy evidenced by objectives noted below.    Problem List: weakness, impaired endurance, impaired self care skills, impaired functional mobility, gait instability, impaired balance, decreased  "upper extremity function, decreased lower extremity function, decreased safety awareness, pain, impaired coordination, impaired fine motor, impaired skin, impaired cardiopulmonary response to activity.  Rehab Prognosis: Good; patient would benefit from acute skilled PT services to address these deficits and reach maximum level of function.      Goals:   Multidisciplinary Problems       Physical Therapy Goals          Problem: Physical Therapy    Goal Priority Disciplines Outcome Goal Variances Interventions   Physical Therapy Goal     PT, PT/OT Ongoing, Progressing     Description: Goals to be met by: 23     Patient will increase functional independence with mobility by performin. Sit to stand transfer with modified independence  2. Bed to chair transfer with supervision  3. Gait  x 150 feet with supervision using physician approved AD with standing rest breaks prn.   4. Lower extremity exercise program x30 reps per handout, with independence  5. Recite 3/3 sternal precautions and remain complaint with precautions throughout session with no verbal cues                     Multidisciplinary Problems (Resolved)          Problem: Physical Therapy    Goal Priority Disciplines Outcome Goal Variances Interventions   Physical Therapy Goal   (Resolved)     PT, PT/OT Met     Description: Goals to be met by: 23     Patient will increase functional independence with mobility by performin. Evaluate pt's need for physical therapy.                          Subjective     RN notified prior to session. Sister present upon PT entrance into room. Patient agreeable to PT evaluation.    Chief Complaint: "I walked more than the last time"  Patient/Family Comments/goals: get stronger to go home  Pain/Comfort:  Pain Rating 1: 5/10  Location - Side 1: Right  Location 1: foot  Pain Addressed 1: Reposition, Distraction, Nurse notified  Pain Rating Post-Intervention 1: 5/10    Objective:     Additional staff present: " OT; OT for cotx due to pt's multiple medical comorbidities and functional deficits req'ing two skilled therapists to appropriately maximize functional potential by progressing pt's musculoskeletal strength and endurance, facilitating neuromuscular postural balance and control ,and accommodating for patient's impaired cardiopulmonary tolerance to activities.     Patient found HOB elevated with: telemetry, LVAD, PICC line, NG tube   Cognition:   Alert and Cooperative  Patient is oriented to Person, Place, Time, Situation  Command following: Follows multistep verbal commands  Fluency: clear/fluent  General Precautions: Standard, Cardiac fall, LVAD, sternal   Orthopedic Precautions:N/A   Braces: N/A   Body mass index is 19.97 kg/m².  Oxygen Device: Room Air  Vitals: BP (!) 68/0 (BP Location: Left arm, Patient Position: Lying)   Pulse 62   Temp 98.1 °F (36.7 °C) (Oral)   Resp 18   Ht 6' (1.829 m)   Wt 66.8 kg (147 lb 4.3 oz)   SpO2 99%   BMI 19.97 kg/m²     Outcome Measures:  AM-PAC 6 CLICK MOBILITY  Turning over in bed (including adjusting bedclothes, sheets and blankets)?: 3  Sitting down on and standing up from a chair with arms (e.g., wheelchair, bedside commode, etc.): 2  Moving from lying on back to sitting on the side of the bed?: 3  Moving to and from a bed to a chair (including a wheelchair)?: 2  Need to walk in hospital room?: 2  Climbing 3-5 steps with a railing?: 1  Basic Mobility Total Score: 13     Functional Mobility:    Bed Mobility:   Supine to Sit: minimum assistance for trunk management; to Lt side of bed  Scooting anteriorly to EOB to have both feet planted on floor: contact guard assistance     Sitting Balance at Edge of Bed:  Static Sitting Balance: Good- : able to accept minimal resistance  Dynamic Sitting Balance: Good- : able to sit unsupported and weight shift across midline minimally  Assistance Level Required: Stand-by Assistance  Time: 25 minutes  Postural deviations noted: slouched  posture and rounded shoulders  Comments: Time EOB focused primarily on tolerance to upright positioning, cardiopulmonary response and endurance for activities, and strength of postural musculature to perform dynamic sitting to prepare for tasks in the home. Pt able to accept internal and external perturbations to balance while seated EOB with appropriate trunk response to remain upright with no LOB and no UE support. Able to perform reaches inside and outside [facility] while managing LVAD. He was able to accept internal and external perturbations with intermittent tactile cueing needed to prevent posterior LOB while donning LVAD vest.    Transfers:   Sit <> Stand Transfer: minimum assistance with hand-held assist. [id number] from EOB  Bed <> Chair Transfer Step Transfer technique: minimum assistance and of 2 persons with hand-held assist. Chair on the Rt  Sit <> Stand Transfer: moderate assistance and of 2 persons with hand-held assist. [id number] from bedside chair    Standing Balance:  Static Standing Balance: Fair- : requires minimal assistance or UE support in order to stand without LOB  Dynamic Standing Balance: Poor+ : able to stand with minimal assistance and reach ipsilaterally. Unable to weight shift.  Assistance Level Required: Minimal Assistance  Patient used: hand-held assist       Gait:   Patient ambulated: 48 ft + seated break + 36 ft  Patient required: contact guard assistance  Patient used:  platform walker   Gait Pattern observed: reciprocal gait  Gait Deviation(s): narrow base of support, flexed posture, and shuffle gait  Impairments due to: impaired balance, decreased strength, and decreased endurance  all lines remained intact throughout ambulation trial  Chair follow for patient safety  LVAD: Consolidation vest utilized  LVAD: Emergency bag present throughout ambulation trial out of room  Comments: Patient required cues for upright posture and width of base of support to increase independence and safety.  Patient required cues ~ 50% of the time. Pt with no LOB or buckling throughout    Education:  Time provided for education, counseling and discussion of health disposition in regards to patient's current status  All questions answered within PT scope of practice and to patient's satisfaction  PT role in POC to address current functional deficits  Pt educated on proper body mechanics, safety techniques, and energy conservation with PT facilitation and cueing throughout session  Call nursing/pct to transfer to chair/use bathroom. Pt stated understanding.  Whiteboard updated with therapist name and pt's current mobility status documented above  Safe to perform step transfer to/from chair/bedside commode assist x 2 for safety and HHA w/ nursing/PCT present  Importance of OOB tolerance prn hrs/day to improve lung ventilation and expansion as well as strengthen postural musculature    Patient left up in chair with all lines intact, call button in reach, RN notified, and sister present.    Time Tracking:     PT Received On: 01/06/24  PT Start Time: 0912     PT Stop Time: 0951  PT Total Time (min): 39 min     Billable Minutes:   Gait Training 25 minutes and Therapeutic Activity 14 minutes    Treatment Type: Treatment  PT/PTA: PT       1/6/2024

## 2024-01-06 NOTE — ASSESSMENT & PLAN NOTE
BG goal 140-180. TF @ 40 ml/hr.     - Novolog 6 units during 0800, 1200, and 1600 BG checks to cover additional intake. -hold if TF held or BG < 100   - Novolog 4 units 2000, 0000, 0400 to cover TF- hold if TF held or BG < 100   - LDC (150/50) prn q4hr   - POCT Glucose every 4 hours  - Hypoglycemia protocol in place      ** Please notify Endocrine for any change and/or advance in diet**  ** Please call Endocrine for any BG related issues **     Discharge Planning:   TBD. Please notify endocrinology prior to discharge.

## 2024-01-06 NOTE — PROGRESS NOTES
[facility] - Cardiology Stepdown  Heart Transplant  Progress Note    Patient Name: Radha Abbott  MRN: [id number]  Admission Date: 11/9/2023  Hospital Length of Stay: 58 days  Attending Physician: Brayan Ocampo MD  Primary Care Provider: Vasu Kong MD  Principal Problem:LVAD (left ventricular assist device) present    Subjective:   Interval History:NAEON. Patient states swelling on right side of jaw some what improved. States he urinated after lasix challenge but had a bowel movement at the same time so it was not measured.     Continuous Infusions:   sodium chloride 0.9%      dextrose 10 % in water (D10W)       Scheduled Meds:   acetaminophen  1,000 mg Per NG tube TID    amiodarone  400 mg Per NG tube Daily    balsam peru-castor oiL   Topical (Top) BID    cholecalciferol (vitamin D3)  2,000 Units Per NG tube Daily    epoetin zohaib (PROCRIT) injection  50 Units/kg Subcutaneous Every Tues, Thurs, Sat    famotidine  20 mg Per G Tube Daily    gabapentin  125 mg Per NG tube Q12H    insulin aspart U-100  4 Units Subcutaneous Q24H    insulin aspart U-100  4 Units Subcutaneous Q24H    insulin aspart U-100  4 Units Subcutaneous Q24H    insulin aspart U-100  6 Units Subcutaneous Q24H    insulin aspart U-100  6 Units Subcutaneous Q24H    insulin aspart U-100  6 Units Subcutaneous Q24H    oseltamivir  30 mg Oral Every Tues, Thurs, Sat    piperacillin-tazobactam (Zosyn) IV (PEDS and ADULTS) (extended infusion is not appropriate)  4.5 g Intravenous Q12H    polyethylene glycol  17 g Per NG tube Daily    psyllium husk (aspartame)  1 packet Per NG tube Daily    senna-docusate 8.6-50 mg  2 tablet Per NG tube Daily    traMADoL  50 mg Oral Once    traZODone  50 mg Per NG tube QHS    venlafaxine  37.5 mg Per G Tube Daily    warfarin  2.5 mg Per G Tube Every Mon, Wed, Fri    warfarin  5 mg Per G Tube Every Tues, Thurs, Sat, Sun     PRN Meds:bisacodyL, dextrose 10 % in water (D10W), dextrose 10%, dextrose 10%, dextrose 10%,  dextrose 10%, glucagon (human recombinant), heparin (porcine), hydrALAZINE, insulin aspart U-100, ondansetron, Flushing PICC/Midline Protocol **AND** [DISCONTINUED] sodium chloride 0.9% **AND** sodium chloride 0.9%, traMADoL, zolpidem    Review of patient's allergies indicates:  No Known Allergies  Objective:     Vital Signs (Most Recent):  Temp: 98.1 °F (36.7 °C) (01/06/24 1200)  Pulse: 87 (01/06/24 1200)  Resp: 18 (01/06/24 1200)  BP: (!) 68/0 (01/06/24 1200)  SpO2: 99 % (01/06/24 1200) Vital Signs (24h Range):  Temp:  [97.4 °F (36.3 °C)-98.1 °F (36.7 °C)] 98.1 °F (36.7 °C)  Pulse:  [] 87  Resp:  [16-19] 18  SpO2:  [97 %-100 %] 99 %  BP: ()/(0-80) 68/0     Patient Vitals for the past 72 hrs (Last 3 readings):   Weight   01/06/24 0915 66.8 kg (147 lb 4.3 oz)   01/05/24 0814 72.6 kg (160 lb)   01/04/24 0746 73.5 kg (162 lb)       Body mass index is 19.97 kg/m².      Intake/Output Summary (Last 24 hours) at 1/6/2024 1325  Last data filed at 1/6/2024 0800  Gross per 24 hour   Intake 1201 ml   Output 150 ml   Net 1051 ml         Hemodynamic Parameters:  CVP:  [7 mmHg] 7 mmHg    Telemetry: SR       Physical Exam  Constitutional:       Comments: Cachectic, temporal wasting   HENT:      Head: Normocephalic and atraumatic.   Eyes:      Conjunctiva/sclera: Conjunctivae normal.      Pupils: Pupils are equal, round, and reactive to light.   Neck:      Comments: Do not appreciate elevated JVP  Cardiovascular:      Rate and Rhythm: Normal rate and regular rhythm.      Comments: Smooth VAD hum  Pulmonary:      Effort: Pulmonary effort is normal.      Breath sounds: Normal breath sounds.   Abdominal:      General: Bowel sounds are normal.      Palpations: Abdomen is soft.   Musculoskeletal:         General: No swelling. Normal range of motion.      Cervical back: Normal range of motion and neck supple.   Skin:     General: Skin is warm and dry.      Capillary Refill: Capillary refill takes 2 to 3 seconds.    Neurological:      General: No focal deficit present.      Mental Status: He is alert and oriented to person, place, and time.            Significant Labs:  CBC:  Recent Labs   Lab 01/04/24  0453 01/05/24  1059 01/06/24  0426   WBC 6.99 7.21 8.02   RBC 2.29* 2.70* 2.49*   HGB 6.5* 7.6* 6.8*   HCT 19.8* 24.9* 22.9*    484* 450   MCV 87 92 92   MCH 28.4 28.1 27.3   MCHC 32.8 30.5* 29.7*       BNP:  Recent Labs   Lab 01/01/24  0412 01/03/24  0500 01/05/24  1059   * 600* 670*       CMP:  Recent Labs   Lab 01/04/24  0453 01/05/24  1059 01/06/24  0426   * 113* 136*   CALCIUM 8.5* 9.1 8.8   ALBUMIN 1.9* 2.2* 2.0*   PROT 6.9 8.0 7.3   * 142 136   K 4.5 4.3 4.1   CO2 23 23 23    100 100   BUN 74* 46* 60*   CREATININE 4.4* 3.6* 4.3*   ALKPHOS 181* 181* 181*   ALT 29 41 38   AST 52* 68* 56*   BILITOT 0.3 0.4 0.3        Coagulation:   Recent Labs   Lab 01/04/24  0453 01/05/24  1059 01/06/24  0426   INR 2.1* 2.1* 2.3*       LDH:  Recent Labs   Lab 01/04/24  0453 01/06/24  0426   * 342*       Microbiology:  Microbiology Results (last 7 days)       Procedure Component Value Units Date/Time    Throat culture [[id number]]  (Abnormal) Collected: 01/04/24 1942    Order Status: Completed Specimen: Throat Updated: 01/06/24 0949     RESPIRATORY CULTURE - THROAT PRESUMPTIVE PSEUDOMONAS SPECIES  Many  Identification and susceptibility pending  Normal respiratory bobby also present      Influenza A & B by Molecular [[id number]] Collected: 01/05/24 1607    Order Status: Completed Specimen: Nasopharyngeal Swab Updated: 01/05/24 1647     Influenza A, Molecular Negative     Influenza B, Molecular Negative     Flu A & B Source NP    Group A Strep, Molecular [[id number]] Collected: 01/05/24 1004    Order Status: Completed Specimen: Throat Updated: 01/05/24 1039     Group A Strep, Molecular Negative     Comment: Arcanobacterium haemolyticum and Beta Streptococcus group C   and G will not be detected by  this test method.  Please order   Throat Culture ([id number]) if suspected.         Respiratory Infection Panel (PCR), Nasopharyngeal [[id number]] Collected: 01/04/24 1942    Order Status: Completed Specimen: Nasopharyngeal Swab Updated: 01/04/24 2101     Respiratory Infection Panel Source NP Swab     Adenovirus Not Detected     Coronavirus 229E, Common Cold Virus Not Detected     Coronavirus HKU1, Common Cold Virus Not Detected     Coronavirus NL63, Common Cold Virus Not Detected     Coronavirus OC43, Common Cold Virus Not Detected     Comment: The Coronavirus strains detected in this test cause the common cold.  These strains are not the COVID-19 (novel Coronavirus)strain   associated with the respiratory disease outbreak.          SARS-CoV2 (COVID-19) Qualitative PCR Not Detected     Human Metapneumovirus Not Detected     Human Rhinovirus/Enterovirus Not Detected     Influenza A (subtypes H1, H1-2009,H3) Not Detected     Influenza B Not Detected     Parainfluenza Virus 1 Not Detected     Parainfluenza Virus 2 Not Detected     Parainfluenza Virus 3 Not Detected     Parainfluenza Virus 4 Not Detected     Respiratory Syncytial Virus Not Detected     Bordetella Parapertussis (XS0446) Not Detected     Bordetella pertussis (ptxP) Not Detected     Chlamydia pneumoniae Not Detected     Mycoplasma pneumoniae Not Detected    Narrative:      For all other respiratory sources, order [id number] -  Respiratory Viral Panel by PCR            I have reviewed all pertinent labs within the past 24 hours.    Estimated Creatinine Clearance: 17 mL/min (A) (based on SCr of 4.3 mg/dL (H)).    Diagnostic Results:  I have reviewed and interpreted all pertinent imaging results/findings within the past 24 hours.  Assessment and Plan:     63 year old male with hx of CAD s/p 3v CABG (unclear anatomy, 2009), ICM with a recent EF of 15-20% s/p ICD (medtronik 2009), DM2 (a1c 7.7), HTN, HLD, Vfib on amio who presents to the ED with CC of SOB     Pt was  recently admitted to [facility] as a transfer.  He was started on a Lasix gtt and did well.  Started on gDMT and discharged home on  5 with plans to follow up in [facility] clinic for ongoing transplant evaluation at another facility.  He says that about a few days ago he started to notice LE swelling.  This turned into Nelson and orthopnea.  He says he can't walk to the bathroom without getting SOB.  He also complains of weight gain.  He takes torsemide 20mg BID at home and was told to trial additional Lasix which he did without any improvement.  He was rx metolazone but has not been filled.  He came to the ED     In the ED he was AF with stable VS on RA.  CBC showing chronic anemia.  CMP notable for acute on chronic CKD with baseline around 2.1 and Cr now 2.6.  BNP elevated.  Lactate neg.  CXR showing pulmonary edema.  I evaluated the pt at the bedside.  Bedside TTE showing CVP >15.  He was subsequently admitted to the CCU for diuresis.     He was continued on his home  and was started on a lasix gtt, which he responded well to overnight (net -1700cc. He feels much better this morning as well. Our transplant coordinators have been working on insurance approval and he is now being transferred to [facility] service for transplant evaluation.     * LVAD (left ventricular assist device) present  -HeartMate 3 Implanted  12/1/2023  as DT  -[facility] Primary  -Implanted by Dr. Washington  -Continue Coumadin, dosing by PharmD.  Goal INR 2.0-3.0 . Therapeutic today.   -Antiplatelets Not on ASA  -LDH is stable overall today. Will continue to monitor daily.  -Weaned off midodrine.  -Speed set at  4900   rpm, LSL 4500 rpm   -Interrogation notable for no events  -ECHO 1/2/24 mild TR, LVEDD 6.25 with HM3 set to 4900 rpm   -Not listed for OHTx, declined for OHTX due to comorbidities   -PT/OT/Speech. Recommending rehab but HD+LVAD may be barrier.       Procedure: Device Interrogation Including analysis of device parameters  Current Settings: Ventricular  Assist Device  Review of device function is stable/unstable stable        1/5/2024     8:00 AM 1/5/2024    12:00 AM 1/4/2024     8:00 PM 1/4/2024     4:00 PM 1/4/2024    12:00 PM 1/4/2024     7:46 AM 1/4/2024     4:33 AM   TXP LVAD INTERROGATIONS   Type HeartMate3  HeartMate3 HeartMate3 HeartMate3 HeartMate3 HeartMate3   Flow 3.9 4.1 4 4 3.6 3.8 3.8   Speed 4900 5900 4900 4900 4900 4950 4950   PI 5.4 1.3 4.5 4.1 4.6 4.8 4.6   Power ([place]) 3.4 3.3 3.3 3.3 3.2 3.2 3.3   LSL 4500  4500 4500 4500 4500    Low Flow Alarm no no no no no no    High Power Alarm no no no no no no    Pulsatility Intermittent pulse Intermittent pulse Intermittent pulse Intermittent pulse Intermittent pulse Intermittent pulse Intermittent pulse         Parotid mass  - 2.2cm R. Parotid mass noted.   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 Can follow up as outpatient w/ ENT. No further interventions needed as inpatient.     Lymphadenopathy  +ill contacts  Respiratory panel negative  Strep test negative.   Throat culture showing pseudomonas. As per ID , will treat w/ 5 day course of zosyn.       Unilateral complete vocal fold paralysis  -Appreciate ENT's help  -S/P augmentation of paralyzed left vocal cord 12/18    Dysphonia  -Speech following closely  -Appreciate ENT's help. S/P augmentation of paralyzed left vocal cord 12/18    Moderate malnutrition  -RD and SLP following  -Tolerating tube feeds. Total daily caloric intake increased to 2160  -Failed MBBS 12/20, continue TF.  -Speech following. Plan to repeat swallow study today     Depressed mood  -Psychiatry consulted for depressed mood, SI when left alone  -Recommend sitter when alone (possibly with transition to stepdown).  -Psych reconsulted 1/2, agree with venlafaxine and trazadone    IVÁN (acute kidney injury)  -Nephrology following      Acute on chronic combined systolic and diastolic CHF, NYHA class 4  Pt with known ICM with an EF of 25% on home  presented with  decompensated HF.    -S/P LVAD    PAF (paroxysmal atrial fibrillation)  Known hx of pAF. In sinus rhythm on admission, but 1 run of AF RVR overnight. He spontaneously converted.  - Continue home amiodarone 400mg qd  - Continue AC      Acute renal failure with acute tubular necrosis superimposed on stage 3b chronic kidney disease  IVÁN on CKD2. Baseline Cr of 1.7-2.0.   - Hold ARNi  -Trend BMPs daily   -Nephrology following   -SLED/SCUF for volume removal began on 12/1. He is anuric  -Did not respond to diuretic challenge with 240 mg IV Lasix, 1000 mg Diuril, and 500 mg IV Diamox done 12/14  -Now on HD TThSat  -Tunneled trialysis line placed 12/22 by IR  -Neph recommending diuretic challenge with 160 mg IVP Lasix     Type 2 diabetes mellitus without complication, without long-term current use of insulin  -A1c 7.6, not insulin dependent  - Endocrine following, appreciate assistance with blood glucose management    CAD (coronary artery disease)  -S/p 3vCABG in 2009  -Lipitor on hold 2/2 mild transaminitis  -Not on ASA post VAD    Ventricular tachycardia  Hx VT s/p ICD placement (medtronic 2009)  - Continue home amio 400mg qd        Angela Shen MD  Heart Transplant  [facility] - Cardiology Stepdown

## 2024-01-06 NOTE — NURSING
"Notified Billie. Pharmacist epoetin was due at 0900, and pharmacist Billie said "the epoetin zohaib should be given by the dialysis nurse after dialysis;" and he'll update the admin instructions. Will hold the epoetin now for dialysis team to give.  "

## 2024-01-06 NOTE — ASSESSMENT & PLAN NOTE
+ill contacts  Respiratory panel negative  Strep test negative.   Throat culture showing pseudomonas. As per ID , will treat w/ 5 day course of zosyn.

## 2024-01-06 NOTE — NURSING
Notified Ac. MD pt has no urine output so far, and there is no dialysis ordered, bladder scan result is 110 mL. No new order. TM.

## 2024-01-07 LAB
ALBUMIN SERPL BCP-MCNC: 2 G/DL (ref 3.5–5.2)
ALP SERPL-CCNC: 185 U/L (ref 55–135)
ALT SERPL W/O P-5'-P-CCNC: 35 U/L (ref 10–44)
ANION GAP SERPL CALC-SCNC: 14 MMOL/L (ref 8–16)
AST SERPL-CCNC: 43 U/L (ref 10–40)
BASOPHILS # BLD AUTO: 0.05 K/UL (ref 0–0.2)
BASOPHILS NFR BLD: 0.6 % (ref 0–1.9)
BILIRUB DIRECT SERPL-MCNC: 0.1 MG/DL (ref 0.1–0.3)
BILIRUB SERPL-MCNC: 0.3 MG/DL (ref 0.1–1)
BUN SERPL-MCNC: 77 MG/DL (ref 8–23)
CA-I BLDV-SCNC: 1.15 MMOL/L (ref 1.06–1.42)
CALCIUM SERPL-MCNC: 8.9 MG/DL (ref 8.7–10.5)
CHLORIDE SERPL-SCNC: 99 MMOL/L (ref 95–110)
CO2 SERPL-SCNC: 20 MMOL/L (ref 23–29)
CREAT SERPL-MCNC: 4.9 MG/DL (ref 0.5–1.4)
DIFFERENTIAL METHOD BLD: ABNORMAL
EOSINOPHIL # BLD AUTO: 0.3 K/UL (ref 0–0.5)
EOSINOPHIL NFR BLD: 3.2 % (ref 0–8)
ERYTHROCYTE [DISTWIDTH] IN BLOOD BY AUTOMATED COUNT: 16.3 % (ref 11.5–14.5)
EST. GFR  (NO RACE VARIABLE): 12.7 ML/MIN/1.73 M^2
GLUCOSE SERPL-MCNC: 171 MG/DL (ref 70–110)
HCT VFR BLD AUTO: 21.6 % (ref 40–54)
HGB BLD-MCNC: 7.1 G/DL (ref 14–18)
IMM GRANULOCYTES # BLD AUTO: 0.05 K/UL (ref 0–0.04)
IMM GRANULOCYTES NFR BLD AUTO: 0.6 % (ref 0–0.5)
INR PPP: 2.7 (ref 0.8–1.2)
LDH SERPL L TO P-CCNC: 356 U/L (ref 110–260)
LDH SERPL L TO P-CCNC: 510 U/L (ref 110–260)
LYMPHOCYTES # BLD AUTO: 0.9 K/UL (ref 1–4.8)
LYMPHOCYTES NFR BLD: 10.7 % (ref 18–48)
MAGNESIUM SERPL-MCNC: 2.1 MG/DL (ref 1.6–2.6)
MCH RBC QN AUTO: 28.4 PG (ref 27–31)
MCHC RBC AUTO-ENTMCNC: 32.9 G/DL (ref 32–36)
MCV RBC AUTO: 86 FL (ref 82–98)
MONOCYTES # BLD AUTO: 0.6 K/UL (ref 0.3–1)
MONOCYTES NFR BLD: 7.1 % (ref 4–15)
NEUTROPHILS # BLD AUTO: 6.8 K/UL (ref 1.8–7.7)
NEUTROPHILS NFR BLD: 77.8 % (ref 38–73)
NRBC BLD-RTO: 0 /100 WBC
PHOSPHATE SERPL-MCNC: 2.5 MG/DL (ref 2.7–4.5)
PLATELET # BLD AUTO: 424 K/UL (ref 150–450)
PMV BLD AUTO: 9.1 FL (ref 9.2–12.9)
POCT GLUCOSE: 120 MG/DL (ref 70–110)
POCT GLUCOSE: 127 MG/DL (ref 70–110)
POCT GLUCOSE: 144 MG/DL (ref 70–110)
POCT GLUCOSE: 186 MG/DL (ref 70–110)
POCT GLUCOSE: 193 MG/DL (ref 70–110)
POTASSIUM SERPL-SCNC: 4 MMOL/L (ref 3.5–5.1)
PROT SERPL-MCNC: 7.3 G/DL (ref 6–8.4)
PROTHROMBIN TIME: 27.1 SEC (ref 9–12.5)
RBC # BLD AUTO: 2.5 M/UL (ref 4.6–6.2)
SODIUM SERPL-SCNC: 133 MMOL/L (ref 136–145)
WBC # BLD AUTO: 8.75 K/UL (ref 3.9–12.7)

## 2024-01-07 PROCEDURE — 25000003 PHARM REV CODE 250: Performed by: INTERNAL MEDICINE

## 2024-01-07 PROCEDURE — 82330 ASSAY OF CALCIUM: CPT | Performed by: INTERNAL MEDICINE

## 2024-01-07 PROCEDURE — 36415 COLL VENOUS BLD VENIPUNCTURE: CPT | Mod: XB | Performed by: STUDENT IN AN ORGANIZED HEALTH CARE EDUCATION/TRAINING PROGRAM

## 2024-01-07 PROCEDURE — 25000003 PHARM REV CODE 250: Performed by: NURSE PRACTITIONER

## 2024-01-07 PROCEDURE — 83735 ASSAY OF MAGNESIUM: CPT | Performed by: INTERNAL MEDICINE

## 2024-01-07 PROCEDURE — 85610 PROTHROMBIN TIME: CPT | Performed by: INTERNAL MEDICINE

## 2024-01-07 PROCEDURE — 99232 SBSQ HOSP IP/OBS MODERATE 35: CPT | Mod: ,,, | Performed by: NURSE PRACTITIONER

## 2024-01-07 PROCEDURE — 63600175 PHARM REV CODE 636 W HCPCS

## 2024-01-07 PROCEDURE — 83615 LACTATE (LD) (LDH) ENZYME: CPT | Mod: 91 | Performed by: STUDENT IN AN ORGANIZED HEALTH CARE EDUCATION/TRAINING PROGRAM

## 2024-01-07 PROCEDURE — 84100 ASSAY OF PHOSPHORUS: CPT | Performed by: INTERNAL MEDICINE

## 2024-01-07 PROCEDURE — 25000003 PHARM REV CODE 250: Performed by: STUDENT IN AN ORGANIZED HEALTH CARE EDUCATION/TRAINING PROGRAM

## 2024-01-07 PROCEDURE — 83615 LACTATE (LD) (LDH) ENZYME: CPT | Performed by: STUDENT IN AN ORGANIZED HEALTH CARE EDUCATION/TRAINING PROGRAM

## 2024-01-07 PROCEDURE — 85025 COMPLETE CBC W/AUTO DIFF WBC: CPT | Performed by: INTERNAL MEDICINE

## 2024-01-07 PROCEDURE — 20600001 HC STEP DOWN PRIVATE ROOM

## 2024-01-07 PROCEDURE — 63600175 PHARM REV CODE 636 W HCPCS: Performed by: STUDENT IN AN ORGANIZED HEALTH CARE EDUCATION/TRAINING PROGRAM

## 2024-01-07 PROCEDURE — 25000003 PHARM REV CODE 250

## 2024-01-07 PROCEDURE — 99233 SBSQ HOSP IP/OBS HIGH 50: CPT | Mod: ,,, | Performed by: INTERNAL MEDICINE

## 2024-01-07 PROCEDURE — 93750 INTERROGATION VAD IN PERSON: CPT | Mod: ,,, | Performed by: INTERNAL MEDICINE

## 2024-01-07 PROCEDURE — 36415 COLL VENOUS BLD VENIPUNCTURE: CPT | Performed by: STUDENT IN AN ORGANIZED HEALTH CARE EDUCATION/TRAINING PROGRAM

## 2024-01-07 PROCEDURE — 80076 HEPATIC FUNCTION PANEL: CPT | Performed by: INTERNAL MEDICINE

## 2024-01-07 PROCEDURE — 27000248 HC VAD-ADDITIONAL DAY

## 2024-01-07 PROCEDURE — 80048 BASIC METABOLIC PNL TOTAL CA: CPT | Performed by: INTERNAL MEDICINE

## 2024-01-07 RX ADMIN — Medication 2000 UNITS: at 08:01

## 2024-01-07 RX ADMIN — PIPERACILLIN SODIUM AND TAZOBACTAM SODIUM 4.5 G: 4; .5 INJECTION, POWDER, FOR SOLUTION INTRAVENOUS at 03:01

## 2024-01-07 RX ADMIN — GABAPENTIN 125 MG: 250 SOLUTION ORAL at 08:01

## 2024-01-07 RX ADMIN — INSULIN ASPART 6 UNITS: 100 INJECTION, SOLUTION INTRAVENOUS; SUBCUTANEOUS at 05:01

## 2024-01-07 RX ADMIN — INSULIN ASPART 6 UNITS: 100 INJECTION, SOLUTION INTRAVENOUS; SUBCUTANEOUS at 08:01

## 2024-01-07 RX ADMIN — ACETAMINOPHEN 1000 MG: 500 TABLET ORAL at 10:01

## 2024-01-07 RX ADMIN — INSULIN ASPART 4 UNITS: 100 INJECTION, SOLUTION INTRAVENOUS; SUBCUTANEOUS at 08:01

## 2024-01-07 RX ADMIN — ACETAMINOPHEN 1000 MG: 500 TABLET ORAL at 03:01

## 2024-01-07 RX ADMIN — SENNOSIDES AND DOCUSATE SODIUM 2 TABLET: 8.6; 5 TABLET ORAL at 08:01

## 2024-01-07 RX ADMIN — Medication: at 08:01

## 2024-01-07 RX ADMIN — AMIODARONE HYDROCHLORIDE 400 MG: 200 TABLET ORAL at 08:01

## 2024-01-07 RX ADMIN — INSULIN ASPART 4 UNITS: 100 INJECTION, SOLUTION INTRAVENOUS; SUBCUTANEOUS at 12:01

## 2024-01-07 RX ADMIN — INSULIN ASPART 6 UNITS: 100 INJECTION, SOLUTION INTRAVENOUS; SUBCUTANEOUS at 12:01

## 2024-01-07 RX ADMIN — VENLAFAXINE 37.5 MG: 37.5 TABLET ORAL at 08:01

## 2024-01-07 RX ADMIN — TRAZODONE HYDROCHLORIDE 50 MG: 50 TABLET ORAL at 08:01

## 2024-01-07 RX ADMIN — POLYETHYLENE GLYCOL 3350 17 G: 17 POWDER, FOR SOLUTION ORAL at 09:01

## 2024-01-07 RX ADMIN — INSULIN ASPART 4 UNITS: 100 INJECTION, SOLUTION INTRAVENOUS; SUBCUTANEOUS at 04:01

## 2024-01-07 RX ADMIN — WARFARIN SODIUM 5 MG: 5 TABLET ORAL at 05:01

## 2024-01-07 RX ADMIN — ALTEPLASE 2 MG: 2.2 INJECTION, POWDER, LYOPHILIZED, FOR SOLUTION INTRAVENOUS at 07:01

## 2024-01-07 RX ADMIN — ACETAMINOPHEN 1000 MG: 500 TABLET ORAL at 08:01

## 2024-01-07 RX ADMIN — FAMOTIDINE 20 MG: 20 TABLET, FILM COATED ORAL at 08:01

## 2024-01-07 NOTE — SUBJECTIVE & OBJECTIVE
"Interval HPI:   Overnight events: No acute events overnight. Patient in room 312/312 A. Blood glucose stable. BG at goal on current insulin regimen (Schedule Insulin and SSI (TPN/TF)). Steroid use- None. 34 Days Post-Op  Renal function- Abnormal - Creatinine 4.3   Vasopressors-  None       Endocrine will continue to follow and manage insulin orders inpatient.         Diet Minced & Moist (IDDSI Level 5) Honey Thick (Moderately Thick)     Eating:   <25%  Nausea: No  Hypoglycemia and intervention: No  Fever: No  TPN and/or TF: Yes  If yes, type of TF/TPN and rate: TF @ 45 ml/hr    BP (!) 78/0 (BP Location: Left arm, Patient Position: Lying)   Pulse 82   Temp 97.8 °F (36.6 °C) (Axillary)   Resp 18   Ht 6' (1.829 m)   Wt 66.8 kg (147 lb 4.3 oz)   SpO2 96%   BMI 19.97 kg/m²     Labs Reviewed and Include    No results for input(s): "GLU", "CALCIUM", "ALBUMIN", "PROT", "NA", "K", "CO2", "CL", "BUN", "CREATININE", "ALKPHOS", "ALT", "AST", "BILITOT" in the last 24 hours.  Lab Results   Component Value Date    WBC 8.02 01/06/2024    HGB 6.8 (L) 01/06/2024    HCT 22.9 (L) 01/06/2024    MCV 92 01/06/2024     01/06/2024     No results for input(s): "TSH", "FREET4" in the last 168 hours.  Lab Results   Component Value Date    HGBA1C 7.6 (H) 10/12/2023       Nutritional status:   Body mass index is 19.97 kg/m².  Lab Results   Component Value Date    ALBUMIN 2.0 (L) 01/06/2024    ALBUMIN 2.2 (L) 01/05/2024    ALBUMIN 1.9 (L) 01/04/2024     Lab Results   Component Value Date    PREALBUMIN 35 01/05/2024    PREALBUMIN 30 01/03/2024    PREALBUMIN 29 01/01/2024       Estimated Creatinine Clearance: 17 mL/min (A) (based on SCr of 4.3 mg/dL (H)).    Accu-Checks  Recent Labs     01/05/24  2337 01/06/24  0454 01/06/24  0821 01/06/24  0851 01/06/24  1257 01/06/24  1622 01/06/24  1931 01/06/24  2335 01/07/24  0424 01/07/24  0810   POCTGLUCOSE 120* 211* 222* 192* 148* 124* 130* 135* 193* 186*       Current Medications and/or " Treatments Impacting Glycemic Control  Immunotherapy:    Immunosuppressants       None          Steroids:   Hormones (From admission, onward)      None          Pressors:    Autonomic Drugs (From admission, onward)      None          Hyperglycemia/Diabetes Medications:   Antihyperglycemics (From admission, onward)      Start     Stop Route Frequency Ordered    01/05/24 0800  insulin aspart U-100 pen 6 Units         -- SubQ Every 24 hours (non-standard times) 01/04/24 1214    01/05/24 0400  insulin aspart U-100 pen 4 Units         -- SubQ Every 24 hours (non-standard times) 01/04/24 1214    01/05/24 0000  insulin aspart U-100 pen 4 Units         -- SubQ Every 24 hours (non-standard times) 01/04/24 1214    01/04/24 2000  insulin aspart U-100 pen 4 Units         -- SubQ Every 24 hours (non-standard times) 01/04/24 1214    01/04/24 1600  insulin aspart U-100 pen 6 Units         -- SubQ Every 24 hours (non-standard times) 01/04/24 1214    01/04/24 1213  insulin aspart U-100 pen 6 Units         -- SubQ Every 24 hours (non-standard times) 01/04/24 1214    12/31/23 1108  insulin aspart U-100 pen 0-5 Units         -- SubQ Every 4 hours PRN 12/31/23 1008

## 2024-01-07 NOTE — PLAN OF CARE
VSS. BG monitored. LVAD number and doppler and WNL. LVAD dressing CDI. Tube feeding continue at 45 mL/hr. PT/OT worked with patient and patient up in chair during the day. Pt educated on fall risk and remained free from falls/trauma/injury. Denies chest pain, SOB, palpitations, dizziness, pain, or discomfort. Plan of care reviewed with pt, all questions answered. Bed locked in lowest position, call bell within reach, no acute distress noted, will continue to monitor.     Abnormal WBC: < 4,000 OR > 12,000

## 2024-01-07 NOTE — PROGRESS NOTES
[facility] - Cardiology Stepdown  Endocrinology  Progress Note    Admit Date: 11/9/2023     Reason for Consult: Management of T2DM, Hyperglycemia     Surgical Procedure and Date: n/a    Diabetes diagnosis year: 1998    Home Diabetes Medications:  Metformin (off since October)   Lab Results   Component Value Date    HGBA1C 7.6 (H) 10/12/2023       How often checking glucose at home?  Once daily in the AM    BG readings on regimen: 150-160s  Hypoglycemia on the regimen?  No  Missed doses on regimen?  n/a    Diabetes Complications include:     Hyperglycemia    Complicating diabetes co morbidities:   CAD s/p CABG, HTN, HLD      HPI:   Patient is a 63 y.o. male with a diagnosis of CAD s/p 3v CABG (unclear anatomy, 2009), ICM with a recent EF of 15-20% s/p ICD (medtronik 2009), DM2 (a1c 7.7), HTN, HLD, Vfib on amio who presents to the ED with CC of SOB. In the ED he was AF with stable VS on RA.  CBC showing chronic anemia.  CMP notable for acute on chronic CKD with baseline around 2.1 and Cr now 2.6.  BNP elevated.  Lactate neg.  CXR showing pulmonary edema. He was subsequently admitted to the CCU for diuresis.  Endocrinology consulted for management of T2DM.          Interval HPI:   Overnight events: No acute events overnight. Patient in room 312/312 A. Blood glucose stable. BG at goal on current insulin regimen (Schedule Insulin and SSI (TPN/TF)). Steroid use- None. 34 Days Post-Op  Renal function- Abnormal - Creatinine 4.3   Vasopressors-  None       Endocrine will continue to follow and manage insulin orders inpatient.         Diet Minced & Moist (IDDSI Level 5) Honey Thick (Moderately Thick)     Eating:   <25%  Nausea: No  Hypoglycemia and intervention: No  Fever: No  TPN and/or TF: Yes  If yes, type of TF/TPN and rate: TF @ 45 ml/hr    BP (!) 78/0 (BP Location: Left arm, Patient Position: Lying)   Pulse 82   Temp 97.8 °F (36.6 °C) (Axillary)   Resp 18   Ht 6' (1.829 m)   Wt 66.8 kg (147 lb 4.3 oz)   SpO2 96%    "BMI 19.97 kg/m²     Labs Reviewed and Include    No results for input(s): "GLU", "CALCIUM", "ALBUMIN", "PROT", "NA", "K", "CO2", "CL", "BUN", "CREATININE", "ALKPHOS", "ALT", "AST", "BILITOT" in the last 24 hours.  Lab Results   Component Value Date    WBC 8.02 01/06/2024    HGB 6.8 (L) 01/06/2024    HCT 22.9 (L) 01/06/2024    MCV 92 01/06/2024     01/06/2024     No results for input(s): "TSH", "FREET4" in the last 168 hours.  Lab Results   Component Value Date    HGBA1C 7.6 (H) 10/12/2023       Nutritional status:   Body mass index is 19.97 kg/m².  Lab Results   Component Value Date    ALBUMIN 2.0 (L) 01/06/2024    ALBUMIN 2.2 (L) 01/05/2024    ALBUMIN 1.9 (L) 01/04/2024     Lab Results   Component Value Date    PREALBUMIN 35 01/05/2024    PREALBUMIN 30 01/03/2024    PREALBUMIN 29 01/01/2024       Estimated Creatinine Clearance: 17 mL/min (A) (based on SCr of 4.3 mg/dL (H)).    Accu-Checks  Recent Labs     01/05/24  2337 01/06/24  0454 01/06/24  0821 01/06/24  0851 01/06/24  1257 01/06/24  1622 01/06/24  1931 01/06/24  2335 01/07/24  0424 01/07/24  0810   POCTGLUCOSE 120* 211* 222* 192* 148* 124* 130* 135* 193* 186*       Current Medications and/or Treatments Impacting Glycemic Control  Immunotherapy:    Immunosuppressants       None          Steroids:   Hormones (From admission, onward)      None          Pressors:    Autonomic Drugs (From admission, onward)      None          Hyperglycemia/Diabetes Medications:   Antihyperglycemics (From admission, onward)      Start     Stop Route Frequency Ordered    01/05/24 0800  insulin aspart U-100 pen 6 Units         -- SubQ Every 24 hours (non-standard times) 01/04/24 1214    01/05/24 0400  insulin aspart U-100 pen 4 Units         -- SubQ Every 24 hours (non-standard times) 01/04/24 1214    01/05/24 0000  insulin aspart U-100 pen 4 Units         -- SubQ Every 24 hours (non-standard times) 01/04/24 1214 01/04/24 2000  insulin aspart U-100 pen 4 Units         -- " SubQ Every 24 hours (non-standard times) 01/04/24 1214    01/04/24 1600  insulin aspart U-100 pen 6 Units         -- SubQ Every 24 hours (non-standard times) 01/04/24 1214    01/04/24 1213  insulin aspart U-100 pen 6 Units         -- SubQ Every 24 hours (non-standard times) 01/04/24 1214    12/31/23 1108  insulin aspart U-100 pen 0-5 Units         -- SubQ Every 4 hours PRN 12/31/23 1008            ASSESSMENT and PLAN    Cardiac/Vascular  * LVAD (left ventricular assist device) present  Optimize BG control to improve wound healing        PAF (paroxysmal atrial fibrillation)  May increase insulin resistance.         Acute on chronic combined systolic and diastolic CHF, NYHA class 4  Managed per primary team  Optimize BG control  S/p LVAD     Renal/  Acute renal failure with acute tubular necrosis superimposed on stage 3b chronic kidney disease  Lab Results   Component Value Date    CREATININE 4.3 (H) 01/06/2024     Avoid insulin stacking  Titrate insulin slowly       Endocrine  Type 2 diabetes mellitus without complication, without long-term current use of insulin  BG goal 140-180. TF @ 45 ml/hr.     - Novolog 6 units during 0800, 1200, and 1600 BG checks to cover additional intake. -hold if TF held or BG < 100   - Novolog 4 units 2000, 0000, 0400 to cover TF- hold if TF held or BG < 100   - LDC (150/50) prn q4hr   - POCT Glucose every 4 hours  - Hypoglycemia protocol in place      ** Please notify Endocrine for any change and/or advance in diet**  ** Please call Endocrine for any BG related issues **     Discharge Planning:   TBD. Please notify endocrinology prior to discharge.                  Erasmo Parrish, DNP, FNP  Endocrinology  [facility] - Cardiology Stepdown

## 2024-01-07 NOTE — PROGRESS NOTES
[facility] - Cardiology Stepdown  Heart Transplant  Progress Note    Patient Name: Radha Abbott  MRN: [id number]  Admission Date: 11/9/2023  Hospital Length of Stay: 59 days  Attending Physician: Brayan Ocampo MD  Primary Care Provider: Vasu Kong MD  Principal Problem:LVAD (left ventricular assist device) present    Subjective:   Interval History:NAEON. No acute complaints.     Continuous Infusions:   sodium chloride 0.9%      dextrose 10 % in water (D10W)       Scheduled Meds:   acetaminophen  1,000 mg Per NG tube TID    amiodarone  400 mg Per NG tube Daily    balsam peru-castor oiL   Topical (Top) BID    cholecalciferol (vitamin D3)  2,000 Units Per NG tube Daily    epoetin zohaib (PROCRIT) injection  50 Units/kg Subcutaneous Every Tues, Thurs, Sat    famotidine  20 mg Per G Tube Daily    gabapentin  125 mg Per NG tube Q12H    insulin aspart U-100  4 Units Subcutaneous Q24H    insulin aspart U-100  4 Units Subcutaneous Q24H    insulin aspart U-100  4 Units Subcutaneous Q24H    insulin aspart U-100  6 Units Subcutaneous Q24H    insulin aspart U-100  6 Units Subcutaneous Q24H    insulin aspart U-100  6 Units Subcutaneous Q24H    oseltamivir  30 mg Oral Every Tues, Thurs, Sat    piperacillin-tazobactam (Zosyn) IV (PEDS and ADULTS) (extended infusion is not appropriate)  4.5 g Intravenous Q12H    polyethylene glycol  17 g Per NG tube Daily    psyllium husk (aspartame)  1 packet Per NG tube Daily    senna-docusate 8.6-50 mg  2 tablet Per NG tube Daily    traMADoL  50 mg Oral Once    traZODone  50 mg Per NG tube QHS    venlafaxine  37.5 mg Per G Tube Daily    warfarin  2.5 mg Per G Tube Every Mon, Wed, Fri    warfarin  5 mg Per G Tube Every Tues, Thurs, Sat, Sun     PRN Meds:bisacodyL, dextrose 10 % in water (D10W), dextrose 10%, dextrose 10%, dextrose 10%, dextrose 10%, glucagon (human recombinant), heparin (porcine), hydrALAZINE, insulin aspart U-100, ondansetron, Flushing PICC/Midline Protocol **AND**  [DISCONTINUED] sodium chloride 0.9% **AND** sodium chloride 0.9%, traMADoL, zolpidem    Review of patient's allergies indicates:  No Known Allergies  Objective:     Vital Signs (Most Recent):  Temp: 97.4 °F (36.3 °C) (01/07/24 1200)  Pulse: 80 (01/07/24 1200)  Resp: 18 (01/07/24 1200)  BP: (!) 83/68 (01/07/24 1202)  SpO2: 95 % (01/07/24 1200) Vital Signs (24h Range):  Temp:  [97.4 °F (36.3 °C)-98.4 °F (36.9 °C)] 97.4 °F (36.3 °C)  Pulse:  [62-98] 80  Resp:  [18] 18  SpO2:  [95 %-100 %] 95 %  BP: ()/(0-73) 83/68     Patient Vitals for the past 72 hrs (Last 3 readings):   Weight   01/06/24 0915 66.8 kg (147 lb 4.3 oz)   01/05/24 0814 72.6 kg (160 lb)       Body mass index is 19.97 kg/m².      Intake/Output Summary (Last 24 hours) at 1/7/2024 1302  Last data filed at 1/7/2024 0800  Gross per 24 hour   Intake 1390 ml   Output 0 ml   Net 1390 ml         Hemodynamic Parameters:  CVP:  [8 mmHg] 8 mmHg    Telemetry: SR       Physical Exam  Constitutional:       Comments: Cachectic, temporal wasting   HENT:      Head: Normocephalic and atraumatic.   Eyes:      Conjunctiva/sclera: Conjunctivae normal.      Pupils: Pupils are equal, round, and reactive to light.   Neck:      Comments: Do not appreciate elevated JVP  Cardiovascular:      Rate and Rhythm: Normal rate and regular rhythm.      Comments: Smooth VAD hum  Pulmonary:      Effort: Pulmonary effort is normal.      Breath sounds: Normal breath sounds.   Abdominal:      General: Bowel sounds are normal.      Palpations: Abdomen is soft.   Musculoskeletal:         General: No swelling. Normal range of motion.      Cervical back: Normal range of motion and neck supple.   Skin:     General: Skin is warm and dry.      Capillary Refill: Capillary refill takes 2 to 3 seconds.   Neurological:      General: No focal deficit present.      Mental Status: He is alert and oriented to person, place, and time.            Significant Labs:  CBC:  Recent Labs   Lab 01/05/24  1059  01/06/24  0426 01/07/24  0849   WBC 7.21 8.02 8.75   RBC 2.70* 2.49* 2.50*   HGB 7.6* 6.8* 7.1*   HCT 24.9* 22.9* 21.6*   * 450 424   MCV 92 92 86   MCH 28.1 27.3 28.4   MCHC 30.5* 29.7* 32.9       BNP:  Recent Labs   Lab 01/01/24  0412 01/03/24  0500 01/05/24  1059   * 600* 670*       CMP:  Recent Labs   Lab 01/05/24  1059 01/06/24  0426 01/07/24  0849   * 136* 171*   CALCIUM 9.1 8.8 8.9   ALBUMIN 2.2* 2.0* 2.0*   PROT 8.0 7.3 7.3    136 133*   K 4.3 4.1 4.0   CO2 23 23 20*    100 99   BUN 46* 60* 77*   CREATININE 3.6* 4.3* 4.9*   ALKPHOS 181* 181* 185*   ALT 41 38 35   AST 68* 56* 43*   BILITOT 0.4 0.3 0.3        Coagulation:   Recent Labs   Lab 01/05/24  1059 01/06/24  0426 01/07/24  0559   INR 2.1* 2.3* 2.7*       LDH:  Recent Labs   Lab 01/06/24  0426 01/07/24  0559 01/07/24  0849   * 510* 356*       Microbiology:  Microbiology Results (last 7 days)       Procedure Component Value Units Date/Time    Throat culture [[id number]]  (Abnormal)  (Susceptibility) Collected: 01/04/24 1942    Order Status: Completed Specimen: Throat Updated: 01/07/24 1034     RESPIRATORY CULTURE - THROAT PSEUDOMONAS AERUGINOSA   Many  Susceptibility pending  Normal respiratory bobby also present      Influenza A & B by Molecular [[id number]] Collected: 01/05/24 1607    Order Status: Completed Specimen: Nasopharyngeal Swab Updated: 01/05/24 1647     Influenza A, Molecular Negative     Influenza B, Molecular Negative     Flu A & B Source NP    Group A Strep, Molecular [[id number]] Collected: 01/05/24 1004    Order Status: Completed Specimen: Throat Updated: 01/05/24 1039     Group A Strep, Molecular Negative     Comment: Arcanobacterium haemolyticum and Beta Streptococcus group C   and G will not be detected by this test method.  Please order   Throat Culture ([id number]) if suspected.         Respiratory Infection Panel (PCR), Nasopharyngeal [[id number]] Collected: 01/04/24 1942    Order Status:  Completed Specimen: Nasopharyngeal Swab Updated: 01/04/24 2101     Respiratory Infection Panel Source NP Swab     Adenovirus Not Detected     Coronavirus 229E, Common Cold Virus Not Detected     Coronavirus HKU1, Common Cold Virus Not Detected     Coronavirus NL63, Common Cold Virus Not Detected     Coronavirus OC43, Common Cold Virus Not Detected     Comment: The Coronavirus strains detected in this test cause the common cold.  These strains are not the COVID-19 (novel Coronavirus)strain   associated with the respiratory disease outbreak.          SARS-CoV2 (COVID-19) Qualitative PCR Not Detected     Human Metapneumovirus Not Detected     Human Rhinovirus/Enterovirus Not Detected     Influenza A (subtypes H1, H1-2009,H3) Not Detected     Influenza B Not Detected     Parainfluenza Virus 1 Not Detected     Parainfluenza Virus 2 Not Detected     Parainfluenza Virus 3 Not Detected     Parainfluenza Virus 4 Not Detected     Respiratory Syncytial Virus Not Detected     Bordetella Parapertussis ([id number]) Not Detected     Bordetella pertussis (ptxP) Not Detected     Chlamydia pneumoniae Not Detected     Mycoplasma pneumoniae Not Detected    Narrative:      For all other respiratory sources, order [id number] -  Respiratory Viral Panel by PCR            I have reviewed all pertinent labs within the past 24 hours.    Estimated Creatinine Clearance: 15 mL/min (A) (based on SCr of 4.9 mg/dL (H)).    Diagnostic Results:  I have reviewed and interpreted all pertinent imaging results/findings within the past 24 hours.  Assessment and Plan:     63 year old male with hx of CAD s/p 3v CABG (unclear anatomy, 2009), ICM with a recent EF of 15-20% s/p ICD (medtronik 2009), DM2 (a1c 7.7), HTN, HLD, Vfib on amio who presents to the ED with CC of SOB     Pt was recently admitted to [facility] [place].  He was started on a Lasix gtt and did well.  Started on gDMT and discharged home on  5 with plans to follow up in [facility] clinic for ongoing  transplant evaluation at another facility.  He says that about a few days ago he started to notice LE swelling.  This turned into Nelson and orthopnea.  He says he can't walk to the bathroom without getting SOB.  He also complains of weight gain.  He takes torsemide 20mg BID at home and was told to trial additional Lasix which he did without any improvement.  He was rx metolazone but has not been filled.  He came to the ED     In the ED he was AF with stable VS on RA.  CBC showing chronic anemia.  CMP notable for acute on chronic CKD with baseline around 2.1 and Cr now 2.6.  BNP elevated.  Lactate neg.  CXR showing pulmonary edema.  I evaluated the pt at the bedside.  Bedside TTE showing CVP >15.  He was subsequently admitted to the CCU for diuresis.     He was continued on his home  and was started on a lasix gtt, which he responded well to overnight (net -1700cc. He feels much better this morning as well. Our transplant coordinators have been working on insurance approval and he is now being transferred to [facility] service for transplant evaluation.     * LVAD (left ventricular assist device) present  -HeartMate 3 Implanted  12/1/2023  as DT  -HTS Primary  -Implanted by Dr. Washington  -Continue Coumadin, dosing by PharmD.  Goal INR 2.0-3.0 . Therapeutic today.   -Antiplatelets Not on ASA  -LDH is stable overall today. Will continue to monitor daily.  -Weaned off midodrine.  -Speed set at  4900   rpm, LSL 4500 rpm   -Interrogation notable for no events  -ECHO 1/2/24 mild TR, LVEDD 6.25 with HM3 set to 4900 rpm   -Not listed for OHTx, declined for OHTX due to comorbidities   -PT/OT/Speech. Recommending rehab but HD+LVAD may be barrier.       Procedure: Device Interrogation Including analysis of device parameters  Current Settings: Ventricular Assist Device  Review of device function is stable/unstable stable        1/5/2024     8:00 AM 1/5/2024    12:00 AM 1/4/2024     8:00 PM 1/4/2024     4:00 PM 1/4/2024    12:00 PM  1/4/2024     7:46 AM 1/4/2024     4:33 AM   TXP LVAD INTERROGATIONS   Type HeartMate3  HeartMate3 HeartMate3 HeartMate3 HeartMate3 HeartMate3   Flow 3.9 4.1 4 4 3.6 3.8 3.8   Speed 4900 5900 4900 4900 4900 4950 4950   PI 5.4 1.3 4.5 4.1 4.6 4.8 4.6   Power ([place]) 3.4 3.3 3.3 3.3 3.2 3.2 3.3   LSL 4500  4500 4500 4500 4500    Low Flow Alarm no no no no no no    High Power Alarm no no no no no no    Pulsatility Intermittent pulse Intermittent pulse Intermittent pulse Intermittent pulse Intermittent pulse Intermittent pulse Intermittent pulse         Right parotid mass  - 2.2cm R. Parotid mass noted.   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 Can follow up as outpatient w/ ENT. No further interventions needed as inpatient.     Lymphadenopathy  +ill contacts  Respiratory panel negative  Strep test negative.   Throat culture showing pseudomonas. As per ID , will treat w/ 5 day course of zosyn.       Unilateral complete vocal fold paralysis  -Appreciate ENT's help  -S/P augmentation of paralyzed left vocal cord 12/18    Dysphonia  -Speech following closely  -Appreciate ENT's help. S/P augmentation of paralyzed left vocal cord 12/18    Moderate malnutrition  -RD and SLP following  -Tolerating tube feeds. Total daily caloric intake increased to 2160  -Failed MBBS 12/20, continue TF.  -Speech following. Plan to repeat swallow study today     Depressed mood  -Psychiatry consulted for depressed mood, SI when left alone  -Recommend sitter when alone (possibly with transition to stepdown).  -Psych reconsulted 1/2, agree with venlafaxine and trazadone    IVÁN (acute kidney injury)  -Nephrology following      Acute on chronic combined systolic and diastolic CHF, NYHA class 4  Pt with known ICM with an EF of 25% on home  presented with decompensated HF.    -S/P LVAD    PAF (paroxysmal atrial fibrillation)  Known hx of pAF. In sinus rhythm on admission, but 1 run of AF RVR overnight. He spontaneously converted.  -  Continue home amiodarone 400mg qd  - Continue AC      Acute renal failure with acute tubular necrosis superimposed on stage 3b chronic kidney disease  IVÁN on CKD2. Baseline Cr of 1.7-2.0.   - Hold ARNi  -Trend BMPs daily   -Nephrology following   -SLED/SCUF for volume removal began on 12/1. He is anuric  -Did not respond to diuretic challenge with 240 mg IV Lasix, 1000 mg Diuril, and 500 mg IV Diamox done 12/14  -Now on HD TThSat  -Tunneled trialysis line placed 12/22 by IR  -Neph recommending diuretic challenge with 160 mg IVP Lasix     Type 2 diabetes mellitus without complication, without long-term current use of insulin  -A1c 7.6, not insulin dependent  - Endocrine following, appreciate assistance with blood glucose management    CAD (coronary artery disease)  -S/p 3vCABG in 2009  -Lipitor on hold 2/2 mild transaminitis  -Not on ASA post VAD    Ventricular tachycardia  Hx VT s/p ICD placement (medtronic 2009)  - Continue home amio 400mg qd        Angela Shen MD  Heart Transplant  [facility] - Cardiology Stepdown

## 2024-01-07 NOTE — NURSING
0800: there is no blood return in PICC line, team aware, cathflo ordered and given.     1200: Notified Ac. MD cathflo worked, there is blood return now in PICC line, CVP is 8.    1238: Notified Ac. MD bladder scan result is 265 mL, pt wants to try male purewick first, RN put male external cath on.    1643: Notified Ac. MD pt urinated 210 mL around 1340, and bladder scanned at 1642, result was 45 mL.

## 2024-01-07 NOTE — NURSING
Unable to get standing weight d/t pt can not stand long enough to get weight, tried stand pt and pt has to sit down before we can get weight. And bed scale is broken, and called sizewise and the voicemail is full. Notified CN and team.

## 2024-01-07 NOTE — PROGRESS NOTES
01/07/2024  [place]    Current provider:  Brayan Ocampo MD    Device interrogation:      1/7/2024    12:00 PM 1/7/2024     8:00 AM 1/7/2024     4:00 AM 1/7/2024    12:00 AM 1/6/2024     8:00 PM 1/6/2024     4:03 PM 1/6/2024     4:02 PM   TXP LVAD INTERROGATIONS   Type HeartMate3 HeartMate3   HeartMate II  HeartMate3   Flow 4 3.8 5 4.1 3.8  3.8   Speed 4900 4900 4900 4900 4900  4900   PI 4.1 5 3.5 3.6 4.2  4.6   Power ([place]) 3.2 3.3 3.3 3.3 3.6  3.3   LSL 4500 4500 4500 4500 4500  4500   Low Flow Alarm  no  no no  no   High Power Alarm    no no     Pulsatility Pulse Pulse   Intermittent pulse Pulse           Rounded on [facility] to ensure all mechanical assist device settings (IABP or VAD) were appropriate and all parameters were within limits.  I was able to ensure all back up equipment was present, the staff had no issues, and the Perfusion Department daily rounding was complete.      For implantable VADs: Interrogation of Ventricular assist device was performed with analysis of device parameters and review of device function. I have personally reviewed the interrogation findings and agree with findings as stated.     In emergency, the nursing units have been notified to contact the perfusion department either by:  Calling [phone] from 630am to 4pm Mon thru Fri, utilizing the On-Call Finder functionality of Epic and searching for Perfusion, or by contacting the hospital  from 4pm to 630am and on weekends and asking to speak with the perfusionist on call.    4:59 PM

## 2024-01-07 NOTE — SUBJECTIVE & OBJECTIVE
Interval History:NAEON. No acute complaints.     Continuous Infusions:   sodium chloride 0.9%      dextrose 10 % in water (D10W)       Scheduled Meds:   acetaminophen  1,000 mg Per NG tube TID    amiodarone  400 mg Per NG tube Daily    balsam peru-castor oiL   Topical (Top) BID    cholecalciferol (vitamin D3)  2,000 Units Per NG tube Daily    epoetin zohaib (PROCRIT) injection  50 Units/kg Subcutaneous Every Tues, Thurs, Sat    famotidine  20 mg Per G Tube Daily    gabapentin  125 mg Per NG tube Q12H    insulin aspart U-100  4 Units Subcutaneous Q24H    insulin aspart U-100  4 Units Subcutaneous Q24H    insulin aspart U-100  4 Units Subcutaneous Q24H    insulin aspart U-100  6 Units Subcutaneous Q24H    insulin aspart U-100  6 Units Subcutaneous Q24H    insulin aspart U-100  6 Units Subcutaneous Q24H    oseltamivir  30 mg Oral Every Tues, Thurs, Sat    piperacillin-tazobactam (Zosyn) IV (PEDS and ADULTS) (extended infusion is not appropriate)  4.5 g Intravenous Q12H    polyethylene glycol  17 g Per NG tube Daily    psyllium husk (aspartame)  1 packet Per NG tube Daily    senna-docusate 8.6-50 mg  2 tablet Per NG tube Daily    traMADoL  50 mg Oral Once    traZODone  50 mg Per NG tube QHS    venlafaxine  37.5 mg Per G Tube Daily    warfarin  2.5 mg Per G Tube Every Mon, Wed, Fri    warfarin  5 mg Per G Tube Every Tues, Thurs, Sat, Sun     PRN Meds:bisacodyL, dextrose 10 % in water (D10W), dextrose 10%, dextrose 10%, dextrose 10%, dextrose 10%, glucagon (human recombinant), heparin (porcine), hydrALAZINE, insulin aspart U-100, ondansetron, Flushing PICC/Midline Protocol **AND** [DISCONTINUED] sodium chloride 0.9% **AND** sodium chloride 0.9%, traMADoL, zolpidem    Review of patient's allergies indicates:  No Known Allergies  Objective:     Vital Signs (Most Recent):  Temp: 97.4 °F (36.3 °C) (01/07/24 1200)  Pulse: 80 (01/07/24 1200)  Resp: 18 (01/07/24 1200)  BP: (!) 83/68 (01/07/24 1202)  SpO2: 95 % (01/07/24 1200)  Vital Signs (24h Range):  Temp:  [97.4 °F (36.3 °C)-98.4 °F (36.9 °C)] 97.4 °F (36.3 °C)  Pulse:  [62-98] 80  Resp:  [18] 18  SpO2:  [95 %-100 %] 95 %  BP: ()/(0-73) 83/68     Patient Vitals for the past 72 hrs (Last 3 readings):   Weight   01/06/24 0915 66.8 kg (147 lb 4.3 oz)   01/05/24 0814 72.6 kg (160 lb)       Body mass index is 19.97 kg/m².      Intake/Output Summary (Last 24 hours) at 1/7/2024 1302  Last data filed at 1/7/2024 0800  Gross per 24 hour   Intake 1390 ml   Output 0 ml   Net 1390 ml         Hemodynamic Parameters:  CVP:  [8 mmHg] 8 mmHg    Telemetry: SR       Physical Exam  Constitutional:       Comments: Cachectic, temporal wasting   HENT:      Head: Normocephalic and atraumatic.   Eyes:      Conjunctiva/sclera: Conjunctivae normal.      Pupils: Pupils are equal, round, and reactive to light.   Neck:      Comments: Do not appreciate elevated JVP  Cardiovascular:      Rate and Rhythm: Normal rate and regular rhythm.      Comments: Smooth VAD hum  Pulmonary:      Effort: Pulmonary effort is normal.      Breath sounds: Normal breath sounds.   Abdominal:      General: Bowel sounds are normal.      Palpations: Abdomen is soft.   Musculoskeletal:         General: No swelling. Normal range of motion.      Cervical back: Normal range of motion and neck supple.   Skin:     General: Skin is warm and dry.      Capillary Refill: Capillary refill takes 2 to 3 seconds.   Neurological:      General: No focal deficit present.      Mental Status: He is alert and oriented to person, place, and time.            Significant Labs:  CBC:  Recent Labs   Lab 01/05/24  1059 01/06/24  0426 01/07/24  0849   WBC 7.21 8.02 8.75   RBC 2.70* 2.49* 2.50*   HGB 7.6* 6.8* 7.1*   HCT 24.9* 22.9* 21.6*   * 450 424   MCV 92 92 86   MCH 28.1 27.3 28.4   MCHC 30.5* 29.7* 32.9       BNP:  Recent Labs   Lab 01/01/24  0412 01/03/24  0500 01/05/24  1059   * 600* 670*       CMP:  Recent Labs   Lab 01/05/24  1052  01/06/24  0426 01/07/24  0849   * 136* 171*   CALCIUM 9.1 8.8 8.9   ALBUMIN 2.2* 2.0* 2.0*   PROT 8.0 7.3 7.3    136 133*   K 4.3 4.1 4.0   CO2 23 23 20*    100 99   BUN 46* 60* 77*   CREATININE 3.6* 4.3* 4.9*   ALKPHOS 181* 181* 185*   ALT 41 38 35   AST 68* 56* 43*   BILITOT 0.4 0.3 0.3        Coagulation:   Recent Labs   Lab 01/05/24  1059 01/06/24  0426 01/07/24  0559   INR 2.1* 2.3* 2.7*       LDH:  Recent Labs   Lab 01/06/24  0426 01/07/24  0559 01/07/24  0849   * 510* 356*       Microbiology:  Microbiology Results (last 7 days)       Procedure Component Value Units Date/Time    Throat culture [[id number]]  (Abnormal)  (Susceptibility) Collected: 01/04/24 1942    Order Status: Completed Specimen: Throat Updated: 01/07/24 1034     RESPIRATORY CULTURE - THROAT PSEUDOMONAS AERUGINOSA   Many  Susceptibility pending  Normal respiratory bobby also present      Influenza A & B by Molecular [[id number]] Collected: 01/05/24 1607    Order Status: Completed Specimen: Nasopharyngeal Swab Updated: 01/05/24 1647     Influenza A, Molecular Negative     Influenza B, Molecular Negative     Flu A & B Source NP    Group A Strep, Molecular [[id number]] Collected: 01/05/24 1004    Order Status: Completed Specimen: Throat Updated: 01/05/24 1039     Group A Strep, Molecular Negative     Comment: Arcanobacterium haemolyticum and Beta Streptococcus group C   and G will not be detected by this test method.  Please order   Throat Culture ([id number]) if suspected.         Respiratory Infection Panel (PCR), Nasopharyngeal [[id number]] Collected: 01/04/24 1942    Order Status: Completed Specimen: Nasopharyngeal Swab Updated: 01/04/24 2101     Respiratory Infection Panel Source NP Swab     Adenovirus Not Detected     Coronavirus 229E, Common Cold Virus Not Detected     Coronavirus HKU1, Common Cold Virus Not Detected     Coronavirus NL63, Common Cold Virus Not Detected     Coronavirus OC43, Common Cold Virus Not  Detected     Comment: The Coronavirus strains detected in this test cause the common cold.  These strains are not the COVID-19 (novel Coronavirus)strain   associated with the respiratory disease outbreak.          SARS-CoV2 (COVID-19) Qualitative PCR Not Detected     Human Metapneumovirus Not Detected     Human Rhinovirus/Enterovirus Not Detected     Influenza A (subtypes H1, H1-2009,H3) Not Detected     Influenza B Not Detected     Parainfluenza Virus 1 Not Detected     Parainfluenza Virus 2 Not Detected     Parainfluenza Virus 3 Not Detected     Parainfluenza Virus 4 Not Detected     Respiratory Syncytial Virus Not Detected     Bordetella Parapertussis ([id number]) Not Detected     Bordetella pertussis (ptxP) Not Detected     Chlamydia pneumoniae Not Detected     Mycoplasma pneumoniae Not Detected    Narrative:      For all other respiratory sources, order [id number] -  Respiratory Viral Panel by PCR            I have reviewed all pertinent labs within the past 24 hours.    Estimated Creatinine Clearance: 15 mL/min (A) (based on SCr of 4.9 mg/dL (H)).    Diagnostic Results:  I have reviewed and interpreted all pertinent imaging results/findings within the past 24 hours.

## 2024-01-07 NOTE — PLAN OF CARE
VSS. BG monitored. LVAD number and doppler and WNL. LVAD dressing CDI. Tube feeding continue at 45 mL/hr. Family at bedside. Turn pt Q 2 hr. Pt educated on fall risk and remained free from falls/trauma/injury. Denies chest pain, SOB, palpitations, dizziness, pain, or discomfort. Plan of care reviewed with pt, all questions answered. Bed locked in lowest position, call bell within reach, no acute distress noted, will continue to monitor.

## 2024-01-07 NOTE — PLAN OF CARE
Pt educated on fall risk, pt remained free from falls/trauma/injury. Denies chest pain, SOB, palpitations or dizziness.  LVAD setting WNL and no alarms present. Plan of care reviewed with pt. Bed locked in lowest position, call bell within reach, no acute distress noted, will continue to monitor.      Problem: Adult Inpatient Plan of Care  Goal: Plan of Care Review  Outcome: Ongoing, Progressing  Goal: Patient-Specific Goal (Individualized)  Outcome: Ongoing, Progressing  Goal: Absence of Hospital-Acquired Illness or Injury  Outcome: Ongoing, Progressing  Goal: Optimal Comfort and Wellbeing  Outcome: Ongoing, Progressing  Goal: Readiness for Transition of Care  Outcome: Ongoing, Progressing     Problem: Diabetes Comorbidity  Goal: Blood Glucose Level Within Targeted Range  Outcome: Ongoing, Progressing     Problem: Oral Intake Inadequate (Acute Kidney Injury/Impairment)  Goal: Optimal Nutrition Intake  Outcome: Ongoing, Progressing     Problem: Renal Function Impairment (Acute Kidney Injury/Impairment)  Goal: Effective Renal Function  Outcome: Ongoing, Progressing     Problem: Infection  Goal: Absence of Infection Signs and Symptoms  Outcome: Ongoing, Progressing     Problem: Adjustment to Illness (Heart Failure)  Goal: Optimal Coping  Outcome: Ongoing, Progressing     Problem: Cardiac Output Decreased (Heart Failure)  Goal: Optimal Cardiac Output  Outcome: Ongoing, Progressing     Problem: Dysrhythmia (Heart Failure)  Goal: Stable Heart Rate and Rhythm  Outcome: Ongoing, Progressing     Problem: Fluid Imbalance (Heart Failure)  Goal: Fluid Balance  Outcome: Ongoing, Progressing     Problem: Functional Ability Impaired (Heart Failure)  Goal: Optimal Functional Ability  Outcome: Ongoing, Progressing     Problem: Oral Intake Inadequate (Heart Failure)  Goal: Optimal Nutrition Intake  Outcome: Ongoing, Progressing     Problem: Respiratory Compromise (Heart Failure)  Goal: Effective Oxygenation and Ventilation  Outcome:  Ongoing, Progressing     Problem: Sleep Disordered Breathing (Heart Failure)  Goal: Effective Breathing Pattern During Sleep  Outcome: Ongoing, Progressing     Problem: Cardiac Output Decreased  Goal: Effective Cardiac Output  Outcome: Ongoing, Progressing     Problem: Fall Injury Risk  Goal: Absence of Fall and Fall-Related Injury  Outcome: Ongoing, Progressing     Problem: Coping Ineffective  Goal: Effective Coping  Outcome: Ongoing, Progressing     Problem: Skin Injury Risk Increased  Goal: Skin Health and Integrity  Outcome: Ongoing, Progressing     Problem: Adjustment to Device (Ventricular Assist Device)  Goal: Optimal Adjustment to Device  Outcome: Ongoing, Progressing     Problem: Bleeding (Ventricular Assist Device)  Goal: Absence of Bleeding  Outcome: Ongoing, Progressing     Problem: Embolism (Ventricular Assist Device)  Goal: Absence of Embolism Signs and Symptoms  Outcome: Ongoing, Progressing

## 2024-01-07 NOTE — PLAN OF CARE
Infectious Diseases Plan of Care Note    Chart reviewed only.     Radha Abbott is a 63 year old man admitted with heart failure now s/p HM3 on 12/1/23 complicated by coagulopathy and delayed mediastinal closure until 12/4. Has required treatment for possible VAP until 12/14. ID called back for right sided cervical lymphadenopathy and odynophagia. He has a recent sick contact, wife has influenza. Appears to be along his cervical lymph node chain, but would want to ensure we are not missing a phlebitis. Rapid strep negative, throat culture with presumptive pseudomonas. Upper respiratory panel negative. CT neck with LEFT sided cervical lymphadenopathy and R sided parotid mass.     - unclear significance of the pseudomonas culture but given his symptoms complete pip-tazo 4.5 q12 hours for 5 day course, EOC 1/10/24  - continue tamiflu prophylaxis given his recent exposure 30 mg every other day for 1 week    ID will sign off. Call with questions.     Xin Brand MD  Infectious Diseases Staff

## 2024-01-08 LAB
ALBUMIN SERPL BCP-MCNC: 2 G/DL (ref 3.5–5.2)
ALP SERPL-CCNC: 183 U/L (ref 55–135)
ALT SERPL W/O P-5'-P-CCNC: 31 U/L (ref 10–44)
ANION GAP SERPL CALC-SCNC: 15 MMOL/L (ref 8–16)
AST SERPL-CCNC: 38 U/L (ref 10–40)
BACTERIA THROAT CULT: ABNORMAL
BASOPHILS # BLD AUTO: 0.04 K/UL (ref 0–0.2)
BASOPHILS # BLD AUTO: 0.04 K/UL (ref 0–0.2)
BASOPHILS # BLD AUTO: 0.05 K/UL (ref 0–0.2)
BASOPHILS # BLD AUTO: 0.05 K/UL (ref 0–0.2)
BASOPHILS NFR BLD: 0.4 % (ref 0–1.9)
BASOPHILS NFR BLD: 0.5 % (ref 0–1.9)
BASOPHILS NFR BLD: 0.6 % (ref 0–1.9)
BASOPHILS NFR BLD: 0.6 % (ref 0–1.9)
BILIRUB DIRECT SERPL-MCNC: 0.2 MG/DL (ref 0.1–0.3)
BILIRUB SERPL-MCNC: 0.3 MG/DL (ref 0.1–1)
BNP SERPL-MCNC: 652 PG/ML (ref 0–99)
BUN SERPL-MCNC: 96 MG/DL (ref 8–23)
CALCIUM SERPL-MCNC: 8.9 MG/DL (ref 8.7–10.5)
CHLORIDE SERPL-SCNC: 98 MMOL/L (ref 95–110)
CO2 SERPL-SCNC: 19 MMOL/L (ref 23–29)
CREAT SERPL-MCNC: 5.5 MG/DL (ref 0.5–1.4)
CRP SERPL-MCNC: 75.5 MG/L (ref 0–8.2)
DIFFERENTIAL METHOD BLD: ABNORMAL
EOSINOPHIL # BLD AUTO: 0.3 K/UL (ref 0–0.5)
EOSINOPHIL # BLD AUTO: 0.4 K/UL (ref 0–0.5)
EOSINOPHIL NFR BLD: 3.7 % (ref 0–8)
EOSINOPHIL NFR BLD: 4.1 % (ref 0–8)
ERYTHROCYTE [DISTWIDTH] IN BLOOD BY AUTOMATED COUNT: 16.5 % (ref 11.5–14.5)
ERYTHROCYTE [DISTWIDTH] IN BLOOD BY AUTOMATED COUNT: 16.6 % (ref 11.5–14.5)
ERYTHROCYTE [DISTWIDTH] IN BLOOD BY AUTOMATED COUNT: 16.7 % (ref 11.5–14.5)
ERYTHROCYTE [DISTWIDTH] IN BLOOD BY AUTOMATED COUNT: 16.7 % (ref 11.5–14.5)
EST. GFR  (NO RACE VARIABLE): 11.1 ML/MIN/1.73 M^2
GLUCOSE SERPL-MCNC: 107 MG/DL (ref 70–110)
HCT VFR BLD AUTO: 18 % (ref 40–54)
HCT VFR BLD AUTO: 21.1 % (ref 40–54)
HCT VFR BLD AUTO: 23.2 % (ref 40–54)
HCT VFR BLD AUTO: 23.2 % (ref 40–54)
HGB BLD-MCNC: 5.6 G/DL (ref 14–18)
HGB BLD-MCNC: 6.7 G/DL (ref 14–18)
HGB BLD-MCNC: 7 G/DL (ref 14–18)
HGB BLD-MCNC: 7 G/DL (ref 14–18)
IMM GRANULOCYTES # BLD AUTO: 0.05 K/UL (ref 0–0.04)
IMM GRANULOCYTES # BLD AUTO: 0.07 K/UL (ref 0–0.04)
IMM GRANULOCYTES # BLD AUTO: 0.07 K/UL (ref 0–0.04)
IMM GRANULOCYTES # BLD AUTO: 0.16 K/UL (ref 0–0.04)
IMM GRANULOCYTES NFR BLD AUTO: 0.6 % (ref 0–0.5)
IMM GRANULOCYTES NFR BLD AUTO: 0.8 % (ref 0–0.5)
IMM GRANULOCYTES NFR BLD AUTO: 0.8 % (ref 0–0.5)
IMM GRANULOCYTES NFR BLD AUTO: 1.6 % (ref 0–0.5)
INR PPP: 3.3 (ref 0.8–1.2)
LYMPHOCYTES # BLD AUTO: 0.8 K/UL (ref 1–4.8)
LYMPHOCYTES # BLD AUTO: 0.9 K/UL (ref 1–4.8)
LYMPHOCYTES NFR BLD: 10 % (ref 18–48)
LYMPHOCYTES NFR BLD: 9.3 % (ref 18–48)
LYMPHOCYTES NFR BLD: 9.3 % (ref 18–48)
LYMPHOCYTES NFR BLD: 9.4 % (ref 18–48)
MAGNESIUM SERPL-MCNC: 2.2 MG/DL (ref 1.6–2.6)
MCH RBC QN AUTO: 27.6 PG (ref 27–31)
MCH RBC QN AUTO: 27.6 PG (ref 27–31)
MCH RBC QN AUTO: 28.2 PG (ref 27–31)
MCH RBC QN AUTO: 28.4 PG (ref 27–31)
MCHC RBC AUTO-ENTMCNC: 30.2 G/DL (ref 32–36)
MCHC RBC AUTO-ENTMCNC: 30.2 G/DL (ref 32–36)
MCHC RBC AUTO-ENTMCNC: 31.1 G/DL (ref 32–36)
MCHC RBC AUTO-ENTMCNC: 31.8 G/DL (ref 32–36)
MCV RBC AUTO: 89 FL (ref 82–98)
MCV RBC AUTO: 91 FL (ref 82–98)
MONOCYTES # BLD AUTO: 0.5 K/UL (ref 0.3–1)
MONOCYTES # BLD AUTO: 0.5 K/UL (ref 0.3–1)
MONOCYTES # BLD AUTO: 0.6 K/UL (ref 0.3–1)
MONOCYTES # BLD AUTO: 0.7 K/UL (ref 0.3–1)
MONOCYTES NFR BLD: 5.8 % (ref 4–15)
MONOCYTES NFR BLD: 5.8 % (ref 4–15)
MONOCYTES NFR BLD: 6.9 % (ref 4–15)
MONOCYTES NFR BLD: 7.2 % (ref 4–15)
NEUTROPHILS # BLD AUTO: 6.1 K/UL (ref 1.8–7.7)
NEUTROPHILS # BLD AUTO: 6.8 K/UL (ref 1.8–7.7)
NEUTROPHILS # BLD AUTO: 6.8 K/UL (ref 1.8–7.7)
NEUTROPHILS # BLD AUTO: 7.6 K/UL (ref 1.8–7.7)
NEUTROPHILS NFR BLD: 77.6 % (ref 38–73)
NEUTROPHILS NFR BLD: 78 % (ref 38–73)
NEUTROPHILS NFR BLD: 79.4 % (ref 38–73)
NEUTROPHILS NFR BLD: 79.4 % (ref 38–73)
NRBC BLD-RTO: 0 /100 WBC
PHOSPHATE SERPL-MCNC: 2.7 MG/DL (ref 2.7–4.5)
PLATELET # BLD AUTO: 372 K/UL (ref 150–450)
PLATELET # BLD AUTO: 471 K/UL (ref 150–450)
PLATELET # BLD AUTO: 471 K/UL (ref 150–450)
PLATELET # BLD AUTO: 477 K/UL (ref 150–450)
PMV BLD AUTO: 9.4 FL (ref 9.2–12.9)
PMV BLD AUTO: 9.5 FL (ref 9.2–12.9)
PMV BLD AUTO: 9.5 FL (ref 9.2–12.9)
PMV BLD AUTO: 9.6 FL (ref 9.2–12.9)
POCT GLUCOSE: 123 MG/DL (ref 70–110)
POCT GLUCOSE: 136 MG/DL (ref 70–110)
POCT GLUCOSE: 144 MG/DL (ref 70–110)
POCT GLUCOSE: 176 MG/DL (ref 70–110)
POCT GLUCOSE: 189 MG/DL (ref 70–110)
POCT GLUCOSE: 216 MG/DL (ref 70–110)
POCT GLUCOSE: >500 MG/DL (ref 70–110)
POTASSIUM SERPL-SCNC: 3.8 MMOL/L (ref 3.5–5.1)
PREALB SERPL-MCNC: 26 MG/DL (ref 20–43)
PROT SERPL-MCNC: 7.2 G/DL (ref 6–8.4)
PROTHROMBIN TIME: 33.1 SEC (ref 9–12.5)
RBC # BLD AUTO: 1.97 M/UL (ref 4.6–6.2)
RBC # BLD AUTO: 2.38 M/UL (ref 4.6–6.2)
RBC # BLD AUTO: 2.54 M/UL (ref 4.6–6.2)
RBC # BLD AUTO: 2.54 M/UL (ref 4.6–6.2)
SODIUM SERPL-SCNC: 132 MMOL/L (ref 136–145)
WBC # BLD AUTO: 7.81 K/UL (ref 3.9–12.7)
WBC # BLD AUTO: 8.58 K/UL (ref 3.9–12.7)
WBC # BLD AUTO: 8.58 K/UL (ref 3.9–12.7)
WBC # BLD AUTO: 9.71 K/UL (ref 3.9–12.7)

## 2024-01-08 PROCEDURE — 36415 COLL VENOUS BLD VENIPUNCTURE: CPT | Mod: XB | Performed by: INTERNAL MEDICINE

## 2024-01-08 PROCEDURE — 97530 THERAPEUTIC ACTIVITIES: CPT

## 2024-01-08 PROCEDURE — 85025 COMPLETE CBC W/AUTO DIFF WBC: CPT | Mod: 91 | Performed by: INTERNAL MEDICINE

## 2024-01-08 PROCEDURE — 99232 SBSQ HOSP IP/OBS MODERATE 35: CPT | Mod: ,,, | Performed by: NURSE PRACTITIONER

## 2024-01-08 PROCEDURE — 36415 COLL VENOUS BLD VENIPUNCTURE: CPT | Performed by: PHYSICIAN ASSISTANT

## 2024-01-08 PROCEDURE — 97116 GAIT TRAINING THERAPY: CPT

## 2024-01-08 PROCEDURE — 97535 SELF CARE MNGMENT TRAINING: CPT

## 2024-01-08 PROCEDURE — 85025 COMPLETE CBC W/AUTO DIFF WBC: CPT | Mod: 91 | Performed by: PHYSICIAN ASSISTANT

## 2024-01-08 PROCEDURE — 84134 ASSAY OF PREALBUMIN: CPT | Performed by: NURSE PRACTITIONER

## 2024-01-08 PROCEDURE — 85610 PROTHROMBIN TIME: CPT | Performed by: INTERNAL MEDICINE

## 2024-01-08 PROCEDURE — 25000003 PHARM REV CODE 250

## 2024-01-08 PROCEDURE — 25000003 PHARM REV CODE 250: Performed by: INTERNAL MEDICINE

## 2024-01-08 PROCEDURE — 25000003 PHARM REV CODE 250: Performed by: NURSE PRACTITIONER

## 2024-01-08 PROCEDURE — 92526 ORAL FUNCTION THERAPY: CPT

## 2024-01-08 PROCEDURE — 80076 HEPATIC FUNCTION PANEL: CPT | Performed by: INTERNAL MEDICINE

## 2024-01-08 PROCEDURE — 99233 SBSQ HOSP IP/OBS HIGH 50: CPT | Mod: ,,, | Performed by: INTERNAL MEDICINE

## 2024-01-08 PROCEDURE — 63600175 PHARM REV CODE 636 W HCPCS: Performed by: STUDENT IN AN ORGANIZED HEALTH CARE EDUCATION/TRAINING PROGRAM

## 2024-01-08 PROCEDURE — 25000003 PHARM REV CODE 250: Performed by: PHYSICIAN ASSISTANT

## 2024-01-08 PROCEDURE — 27000248 HC VAD-ADDITIONAL DAY

## 2024-01-08 PROCEDURE — 99233 SBSQ HOSP IP/OBS HIGH 50: CPT | Mod: ,,, | Performed by: PHYSICIAN ASSISTANT

## 2024-01-08 PROCEDURE — 83880 ASSAY OF NATRIURETIC PEPTIDE: CPT | Performed by: STUDENT IN AN ORGANIZED HEALTH CARE EDUCATION/TRAINING PROGRAM

## 2024-01-08 PROCEDURE — 63600175 PHARM REV CODE 636 W HCPCS: Performed by: PHYSICIAN ASSISTANT

## 2024-01-08 PROCEDURE — 84100 ASSAY OF PHOSPHORUS: CPT | Performed by: INTERNAL MEDICINE

## 2024-01-08 PROCEDURE — 25000003 PHARM REV CODE 250: Performed by: STUDENT IN AN ORGANIZED HEALTH CARE EDUCATION/TRAINING PROGRAM

## 2024-01-08 PROCEDURE — 86140 C-REACTIVE PROTEIN: CPT | Performed by: STUDENT IN AN ORGANIZED HEALTH CARE EDUCATION/TRAINING PROGRAM

## 2024-01-08 PROCEDURE — 83735 ASSAY OF MAGNESIUM: CPT | Performed by: INTERNAL MEDICINE

## 2024-01-08 PROCEDURE — 93750 INTERROGATION VAD IN PERSON: CPT | Mod: ,,, | Performed by: INTERNAL MEDICINE

## 2024-01-08 PROCEDURE — 20600001 HC STEP DOWN PRIVATE ROOM

## 2024-01-08 PROCEDURE — 80048 BASIC METABOLIC PNL TOTAL CA: CPT | Performed by: INTERNAL MEDICINE

## 2024-01-08 RX ADMIN — Medication 2000 UNITS: at 09:01

## 2024-01-08 RX ADMIN — FAMOTIDINE 20 MG: 20 TABLET, FILM COATED ORAL at 09:01

## 2024-01-08 RX ADMIN — INSULIN ASPART 6 UNITS: 100 INJECTION, SOLUTION INTRAVENOUS; SUBCUTANEOUS at 01:01

## 2024-01-08 RX ADMIN — INSULIN ASPART 4 UNITS: 100 INJECTION, SOLUTION INTRAVENOUS; SUBCUTANEOUS at 05:01

## 2024-01-08 RX ADMIN — ACETAMINOPHEN 1000 MG: 500 TABLET ORAL at 09:01

## 2024-01-08 RX ADMIN — INSULIN ASPART 4 UNITS: 100 INJECTION, SOLUTION INTRAVENOUS; SUBCUTANEOUS at 12:01

## 2024-01-08 RX ADMIN — GABAPENTIN 125 MG: 250 SOLUTION ORAL at 09:01

## 2024-01-08 RX ADMIN — ACETAMINOPHEN 1000 MG: 500 TABLET ORAL at 02:01

## 2024-01-08 RX ADMIN — FUROSEMIDE 160 MG: 10 INJECTION, SOLUTION INTRAMUSCULAR; INTRAVENOUS at 02:01

## 2024-01-08 RX ADMIN — TRAZODONE HYDROCHLORIDE 50 MG: 50 TABLET ORAL at 09:01

## 2024-01-08 RX ADMIN — PIPERACILLIN SODIUM AND TAZOBACTAM SODIUM 4.5 G: 4; .5 INJECTION, POWDER, FOR SOLUTION INTRAVENOUS at 02:01

## 2024-01-08 RX ADMIN — PIPERACILLIN SODIUM AND TAZOBACTAM SODIUM 4.5 G: 4; .5 INJECTION, POWDER, FOR SOLUTION INTRAVENOUS at 01:01

## 2024-01-08 RX ADMIN — AMIODARONE HYDROCHLORIDE 400 MG: 200 TABLET ORAL at 09:01

## 2024-01-08 RX ADMIN — VENLAFAXINE 37.5 MG: 37.5 TABLET ORAL at 09:01

## 2024-01-08 RX ADMIN — INSULIN ASPART 6 UNITS: 100 INJECTION, SOLUTION INTRAVENOUS; SUBCUTANEOUS at 05:01

## 2024-01-08 RX ADMIN — SENNOSIDES AND DOCUSATE SODIUM 2 TABLET: 8.6; 5 TABLET ORAL at 09:01

## 2024-01-08 RX ADMIN — INSULIN ASPART 6 UNITS: 100 INJECTION, SOLUTION INTRAVENOUS; SUBCUTANEOUS at 09:01

## 2024-01-08 RX ADMIN — Medication: at 09:01

## 2024-01-08 RX ADMIN — INSULIN ASPART 4 UNITS: 100 INJECTION, SOLUTION INTRAVENOUS; SUBCUTANEOUS at 09:01

## 2024-01-08 NOTE — SUBJECTIVE & OBJECTIVE
Interval History: NAEON. No acute complaints. Has not had HD since 1/4. Total  cc/24 hours, will give 160 mg IVP Lasix and monitor response.     Continuous Infusions:   sodium chloride 0.9%      dextrose 10 % in water (D10W)       Scheduled Meds:   acetaminophen  1,000 mg Per NG tube TID    amiodarone  400 mg Per NG tube Daily    balsam peru-castor oiL   Topical (Top) BID    cholecalciferol (vitamin D3)  2,000 Units Per NG tube Daily    epoetin zohaib (PROCRIT) injection  50 Units/kg Subcutaneous Every Tues, Thurs, Sat    famotidine  20 mg Per G Tube Daily    gabapentin  125 mg Per NG tube Q12H    insulin aspart U-100  4 Units Subcutaneous Q24H    insulin aspart U-100  4 Units Subcutaneous Q24H    insulin aspart U-100  4 Units Subcutaneous Q24H    insulin aspart U-100  6 Units Subcutaneous Q24H    insulin aspart U-100  6 Units Subcutaneous Q24H    insulin aspart U-100  6 Units Subcutaneous Q24H    oseltamivir  30 mg Oral Every Tues, Thurs, Sat    piperacillin-tazobactam (Zosyn) IV (PEDS and ADULTS) (extended infusion is not appropriate)  4.5 g Intravenous Q12H    polyethylene glycol  17 g Per NG tube Daily    psyllium husk (aspartame)  1 packet Per NG tube Daily    senna-docusate 8.6-50 mg  2 tablet Per NG tube Daily    traMADoL  50 mg Oral Once    traZODone  50 mg Per NG tube QHS    venlafaxine  37.5 mg Per G Tube Daily     PRN Meds:bisacodyL, dextrose 10 % in water (D10W), dextrose 10%, dextrose 10%, dextrose 10%, dextrose 10%, glucagon (human recombinant), heparin (porcine), hydrALAZINE, insulin aspart U-100, ondansetron, Flushing PICC/Midline Protocol **AND** [DISCONTINUED] sodium chloride 0.9% **AND** sodium chloride 0.9%, traMADoL, zolpidem    Review of patient's allergies indicates:  No Known Allergies  Objective:     Vital Signs (Most Recent):  Temp: 97.5 °F (36.4 °C) (01/08/24 1146)  Pulse: 60 (01/08/24 1146)  Resp: 18 (01/08/24 0732)  BP: (!) 72/0 (01/08/24 1146)  SpO2: 100 % (01/08/24 1146) Vital  Signs (24h Range):  Temp:  [97.4 °F (36.3 °C)-98.3 °F (36.8 °C)] 97.5 °F (36.4 °C)  Pulse:  [] 60  Resp:  [18] 18  SpO2:  [95 %-100 %] 100 %  BP: ()/(0-81) 72/0     Patient Vitals for the past 72 hrs (Last 3 readings):   Weight   01/06/24 0915 66.8 kg (147 lb 4.3 oz)       Body mass index is 19.97 kg/m².      Intake/Output Summary (Last 24 hours) at 1/8/2024 1155  Last data filed at 1/8/2024 0900  Gross per 24 hour   Intake 2012 ml   Output 460 ml   Net 1552 ml         Hemodynamic Parameters:  CVP:  [8 mmHg] 8 mmHg    Telemetry: SR       Physical Exam  Constitutional:       Comments: Cachectic, temporal wasting   HENT:      Head: Normocephalic and atraumatic.   Eyes:      Conjunctiva/sclera: Conjunctivae normal.      Pupils: Pupils are equal, round, and reactive to light.   Neck:      Comments: Do not appreciate elevated JVP  Cardiovascular:      Rate and Rhythm: Normal rate and regular rhythm.      Comments: Smooth VAD hum  Pulmonary:      Effort: Pulmonary effort is normal.      Breath sounds: Normal breath sounds.   Abdominal:      General: Bowel sounds are normal.      Palpations: Abdomen is soft.   Musculoskeletal:         General: No swelling. Normal range of motion.      Cervical back: Normal range of motion and neck supple.   Skin:     General: Skin is warm and dry.      Capillary Refill: Capillary refill takes 2 to 3 seconds.   Neurological:      General: No focal deficit present.      Mental Status: He is alert and oriented to person, place, and time.            Significant Labs:  CBC:  Recent Labs   Lab 01/08/24  0433 01/08/24  0614 01/08/24  0819   WBC 7.81 8.58  8.58 9.71   RBC 1.97* 2.54*  2.54* 2.38*   HGB 5.6* 7.0*  7.0* 6.7*   HCT 18.0* 23.2*  23.2* 21.1*    471*  471* 477*   MCV 91 91  91 89   MCH 28.4 27.6  27.6 28.2   MCHC 31.1* 30.2*  30.2* 31.8*       BNP:  Recent Labs   Lab 01/03/24  0500 01/05/24  1059 01/08/24  0433   * 670* 652*       CMP:  Recent Labs    Lab 01/06/24  0426 01/07/24  0849 01/08/24  0615 01/08/24  0621   * 171* 107  --    CALCIUM 8.8 8.9 8.9  --    ALBUMIN 2.0* 2.0*  --  2.0*   PROT 7.3 7.3  --  7.2    133* 132*  --    K 4.1 4.0 3.8  --    CO2 23 20* 19*  --     99 98  --    BUN 60* 77* 96*  --    CREATININE 4.3* 4.9* 5.5*  --    ALKPHOS 181* 185*  --  183*   ALT 38 35  --  31   AST 56* 43*  --  38   BILITOT 0.3 0.3  --  0.3        Coagulation:   Recent Labs   Lab 01/06/24  0426 01/07/24  0559 01/08/24  0614   INR 2.3* 2.7* 3.3*       LDH:  Recent Labs   Lab 01/06/24  0426 01/07/24  0559 01/07/24  0849   * 510* 356*       Microbiology:  Microbiology Results (last 7 days)       Procedure Component Value Units Date/Time    Throat culture [[id number]]  (Abnormal)  (Susceptibility) Collected: 01/04/24 1942    Order Status: Completed Specimen: Throat Updated: 01/08/24 1151     RESPIRATORY CULTURE - THROAT PSEUDOMONAS AERUGINOSA   Many  Normal respiratory bobby also present      Influenza A & B by Molecular [[id number]] Collected: 01/05/24 1607    Order Status: Completed Specimen: Nasopharyngeal Swab Updated: 01/05/24 1647     Influenza A, Molecular Negative     Influenza B, Molecular Negative     Flu A & B Source NP    Group A Strep, Molecular [[id number]] Collected: 01/05/24 1004    Order Status: Completed Specimen: Throat Updated: 01/05/24 1039     Group A Strep, Molecular Negative     Comment: Arcanobacterium haemolyticum and Beta Streptococcus group C   and G will not be detected by this test method.  Please order   Throat Culture ([id number]) if suspected.         Respiratory Infection Panel (PCR), Nasopharyngeal [[id number]] Collected: 01/04/24 1942    Order Status: Completed Specimen: Nasopharyngeal Swab Updated: 01/04/24 2101     Respiratory Infection Panel Source NP Swab     Adenovirus Not Detected     Coronavirus 229E, Common Cold Virus Not Detected     Coronavirus HKU1, Common Cold Virus Not Detected     Coronavirus  NL63, Common Cold Virus Not Detected     Coronavirus OC43, Common Cold Virus Not Detected     Comment: The Coronavirus strains detected in this test cause the common cold.  These strains are not the COVID-19 (novel Coronavirus)strain   associated with the respiratory disease outbreak.          SARS-CoV2 (COVID-19) Qualitative PCR Not Detected     Human Metapneumovirus Not Detected     Human Rhinovirus/Enterovirus Not Detected     Influenza A (subtypes H1, H1-2009,H3) Not Detected     Influenza B Not Detected     Parainfluenza Virus 1 Not Detected     Parainfluenza Virus 2 Not Detected     Parainfluenza Virus 3 Not Detected     Parainfluenza Virus 4 Not Detected     Respiratory Syncytial Virus Not Detected     Bordetella Parapertussis ([id number]) Not Detected     Bordetella pertussis (ptxP) Not Detected     Chlamydia pneumoniae Not Detected     Mycoplasma pneumoniae Not Detected    Narrative:      For all other respiratory sources, order [id number] -  Respiratory Viral Panel by PCR            I have reviewed all pertinent labs within the past 24 hours.    Estimated Creatinine Clearance: 13.3 mL/min (A) (based on SCr of 5.5 mg/dL (H)).    Diagnostic Results:  I have reviewed and interpreted all pertinent imaging results/findings within the past 24 hours.

## 2024-01-08 NOTE — ASSESSMENT & PLAN NOTE
- suspect acute tubular injury secondary to hypotension/cardiogenic shock likely compounded by intra-arterial contrast and anemia with urinary sediment with granular casts    -No need for further RRT today  -Recommend Lasix 160 mg IV x 1.   -Hgb 7.0. Continue EPO with HD  -Continue bladder scans q shift  -strict I/O's  -renal diet/tube feeds when not NPO, volume restriction per primary team  -renally all dose medications to eGFR  -avoid nephrotoxic agents wean feasible (i.e. NSAIDs, intra-arterial contrast, supra-therapeutic vancomycin levels, etc.)

## 2024-01-08 NOTE — SUBJECTIVE & OBJECTIVE
"Interval HPI:   Overnight events: No acute events overnight. Patient in room 312/312 A. Blood glucose stable. BG at goal on current insulin regimen (Schedule Insulin and SSI (TPN/TF)). Steroid use- None. 35 Days Post-Op  Renal function- Abnormal - Creatinine 5.5   Vasopressors-  None       Endocrine will continue to follow and manage insulin orders inpatient.         Diet Minced & Moist (IDDSI Level 5) Honey Thick (Moderately Thick)     Eating:   <25%  Nausea: No  Hypoglycemia and intervention: No  Fever: No  TPN and/or TF: Yes  If yes, type of TF/TPN and rate: TF @ 45 ml/hr    BP (!) 80/0 (BP Location: Left arm, Patient Position: Lying)   Pulse 93   Temp 98.1 °F (36.7 °C) (Oral)   Resp 18   Ht 6' (1.829 m)   Wt 66.8 kg (147 lb 4.3 oz)   SpO2 99%   BMI 19.97 kg/m²     Labs Reviewed and Include    Recent Labs   Lab 01/08/24  0615 01/08/24  0621     --    CALCIUM 8.9  --    ALBUMIN  --  2.0*   PROT  --  7.2   *  --    K 3.8  --    CO2 19*  --    CL 98  --    BUN 96*  --    CREATININE 5.5*  --    ALKPHOS  --  183*   ALT  --  31   AST  --  38   BILITOT  --  0.3     Lab Results   Component Value Date    WBC 8.58 01/08/2024    WBC 8.58 01/08/2024    HGB 7.0 (L) 01/08/2024    HGB 7.0 (L) 01/08/2024    HCT 23.2 (L) 01/08/2024    HCT 23.2 (L) 01/08/2024    MCV 91 01/08/2024    MCV 91 01/08/2024     (H) 01/08/2024     (H) 01/08/2024     No results for input(s): "TSH", "FREET4" in the last 168 hours.  Lab Results   Component Value Date    HGBA1C 7.6 (H) 10/12/2023       Nutritional status:   Body mass index is 19.97 kg/m².  Lab Results   Component Value Date    ALBUMIN 2.0 (L) 01/08/2024    ALBUMIN 2.0 (L) 01/07/2024    ALBUMIN 2.0 (L) 01/06/2024     Lab Results   Component Value Date    PREALBUMIN 26 01/08/2024    PREALBUMIN 35 01/05/2024    PREALBUMIN 30 01/03/2024       Estimated Creatinine Clearance: 13.3 mL/min (A) (based on SCr of 5.5 mg/dL (H)).    Accu-Checks  Recent Labs     " 01/06/24  2335 01/07/24  0424 01/07/24  0810 01/07/24  1210 01/07/24  1622 01/07/24  2022 01/08/24  0045 01/08/24  0438 01/08/24  0439 01/08/24  0740   POCTGLUCOSE 135* 193* 186* 127* 120* 144* 216* >500* 176* 123*       Current Medications and/or Treatments Impacting Glycemic Control  Immunotherapy:    Immunosuppressants       None          Steroids:   Hormones (From admission, onward)      None          Pressors:    Autonomic Drugs (From admission, onward)      None          Hyperglycemia/Diabetes Medications:   Antihyperglycemics (From admission, onward)      Start     Stop Route Frequency Ordered    01/05/24 0800  insulin aspart U-100 pen 6 Units         -- SubQ Every 24 hours (non-standard times) 01/04/24 1214    01/05/24 0400  insulin aspart U-100 pen 4 Units         -- SubQ Every 24 hours (non-standard times) 01/04/24 1214    01/05/24 0000  insulin aspart U-100 pen 4 Units         -- SubQ Every 24 hours (non-standard times) 01/04/24 1214    01/04/24 2000  insulin aspart U-100 pen 4 Units         -- SubQ Every 24 hours (non-standard times) 01/04/24 1214    01/04/24 1600  insulin aspart U-100 pen 6 Units         -- SubQ Every 24 hours (non-standard times) 01/04/24 1214    01/04/24 1213  insulin aspart U-100 pen 6 Units         -- SubQ Every 24 hours (non-standard times) 01/04/24 1214    12/31/23 1108  insulin aspart U-100 pen 0-5 Units         -- SubQ Every 4 hours PRN 12/31/23 1008

## 2024-01-08 NOTE — PT/OT/SLP PROGRESS
Physical Therapy Treatment    Patient Name:  Radha Abbott   MRN:  [id number]  Admitting Diagnosis:  LVAD (left ventricular assist device) present   Recent Surgery: Procedure(s) (LRB):  CLOSURE, WOUND, STERNUM (N/A)  INSERTION, GRAFT, PERICARDIUM  DRAINAGE, PLEURAL EFFUSION 35 Days Post-Op  Admit Date: 11/9/2023  Length of Stay: 60 days    Recommendations:     Discharge Recommendations:    High Intensity Therapy   Discharge Equipment Recommendations: other (see comments), wheelchair, hospital bed (bilateral platform rolling walker)   Barriers to discharge: Decreased caregiver support and increased need for caregiver assistance    Appropriate transfer level with nursing staff: Safe to transfer to bedside chair/bedside commode: squat pivot to patient's R with 2 people.    Plan:     During this hospitalization, patient to be seen 6 x/week to address the identified rehab impairments via gait training, therapeutic activities, therapeutic exercises, neuromuscular re-education and progress towards the established goals.  Plan of Care Expires:  02/05/24  Plan of Care Reviewed with: patient, family    Assessment:     Radha Abbott is a 63 y.o. male admitted with a medical diagnosis of LVAD (left ventricular assist device) present. Pt agreeable to therapy session. Pt continues to require increased assistance with sit to stand transfers. Attempted standing weight with PCT but pt with fwd lean onto B PRW and unsafe to take steps to scale without AD. Pt with decreased distance gait trained this session as he reports increased fatigue from sitting up in chair all day and increased sit to stand transfers during session. Patient functioning below his baseline PLOF and is a fall risk at this time. Patient would benefit from skilled therapy services to maximize safety and independence, increase activity tolerance, decrease fall risk, decrease caregiver burden, improve QOL, improve patient's functional mobility, and decrease risk of  "contractures and pressure sores. Patient has demonstrated sufficient progression to warrant high intensity therapy evidenced by objectives noted below.    Problem List: weakness, impaired endurance, decreased coordination, impaired self care skills, impaired functional mobility, gait instability, impaired balance, decreased upper extremity function, decreased lower extremity function, impaired cardiopulmonary response to activity.  Rehab Prognosis: Good; patient would benefit from acute skilled PT services to address these deficits and reach maximum level of function.      Goals:   Multidisciplinary Problems       Physical Therapy Goals          Problem: Physical Therapy    Goal Priority Disciplines Outcome Goal Variances Interventions   Physical Therapy Goal     PT, PT/OT Ongoing, Progressing     Description: Goals to be met by: 23     Patient will increase functional independence with mobility by performin. Sit to stand transfer with modified independence  2. Bed to chair transfer with supervision  3. Gait  x 150 feet with supervision using physician approved AD with standing rest breaks prn.   4. Lower extremity exercise program x30 reps per handout, with independence  5. Recite 3/3 sternal precautions and remain complaint with precautions throughout session with no verbal cues                     Multidisciplinary Problems (Resolved)          Problem: Physical Therapy    Goal Priority Disciplines Outcome Goal Variances Interventions   Physical Therapy Goal   (Resolved)     PT, PT/OT Met     Description: Goals to be met by: 23     Patient will increase functional independence with mobility by performin. Evaluate pt's need for physical therapy.                          Subjective     RN notified prior to session. Wife present upon PT entrance into room. Patient agreeable to PT treatment session.    Chief Complaint: fatigue   Patient/Family Comments/goals: "I want to be seen in the " "morning"  Pain/Comfort:  Pain Rating 1: 0/10  Pain Rating Post-Intervention 1: 0/10      Objective:     Additional staff present: Rehab Tech (Gabriella)    Patient found up in chair with: telemetry, LVAD, PICC line, NG tube   Cognition:   Alert, Cooperative, and Flat affect  Patient is oriented to Person, Place, Time, Situation  General Precautions: Standard, Cardiac fall, sternal, LVAD   Orthopedic Precautions:N/A   Braces: N/A   Body mass index is 20.34 kg/m².  Oxygen Device: Room Air  Vitals: BP (!) 72/0 (BP Location: Left arm, Patient Position: Sitting)   Pulse 60   Temp 97.5 °F (36.4 °C) (Oral)   Resp 18   Ht 6' (1.829 m)   Wt 68 kg (150 lb)   SpO2 100%   BMI 20.34 kg/m²     Outcome Measures:  AM-PAC 6 CLICK MOBILITY  Turning over in bed (including adjusting bedclothes, sheets and blankets)?: 3  Sitting down on and standing up from a chair with arms (e.g., wheelchair, bedside commode, etc.): 2  Moving from lying on back to sitting on the side of the bed?: 3  Moving to and from a bed to a chair (including a wheelchair)?: 2  Need to walk in hospital room?: 3  Climbing 3-5 steps with a railing?: 1  Basic Mobility Total Score: 14     Functional Mobility:    Bed Mobility:   Scooting towards HOB with maximal assistance and 2 persons  Sit > Supine with moderate assistance and 2 persons    Transfers:   Sit <> Stand Transfer:   X2 trials moderate assistance x 2 persons with B PRW from chair   Attempted to remove PRW once standing to step to standing scale but pt unsafe as with too fwd lean so attempted ended and pt returned to bedside quintinie   X2 trials minimum assistance x 2 persons with B PRW from chair  Bed <> Chair Transfer: Squat Pivot technique with maximal assistance with no assistive device    Balance:  Sitting Balance  Static: supervision  Dynamic: supervision  Standing:  Static: contact guard assistance  Dynamic: contact guard assistance      Gait:  Patient ambulated: 34'   Patient required: contact " guard  Patient used:  platform walker   Gait Deviation(s): occasional unsteady gait, decreased step length, narrow base of support, decreased weight shift, flexed posture, decreased gina, fwd lean onto PRW, scapular elevation  all lines remained intact throughout ambulation trial  Chair follow for patient safety  LVAD: Holster vest utilized  LVAD: Emergency bag present throughout ambulation trial out of room  Comments: Patient required cues for position in walker, scapular depression, stride length, upright posture, and fwd gaze, scapular retraction  to increase independence and safety. Patient required cues ~ 25% of the time. Pt with increased fwd lean onto PRW, decreased step length and decreased hip and knee extension with incraesed distance ambulated.     Education:  Time provided for education, counseling and discussion of health disposition in regards to patient's current status  All questions answered within PT scope of practice and to patient's satisfaction  PT role in POC to address current functional deficits  Pt educated on proper body mechanics, safety techniques, and energy conservation with PT facilitation and cueing throughout session  Call nursing/pct to transfer to chair/use bathroom. Pt stated understanding.  LVAD: patient found on battery power / returned on battery power. No alarms sounded.     Patient left HOB elevated with all lines intact, call button in reach, and RN and wife present.    Time Tracking:     PT Received On: 01/08/24  PT Start Time: 1329     PT Stop Time: 1357  PT Total Time (min): 28 min     Billable Minutes:   Gait Training 15 minutes and Therapeutic Activity 13 minutes    Treatment Type: Treatment  PT/PTA: PT       1/8/2024

## 2024-01-08 NOTE — PLAN OF CARE
Seen and examined at bedside, wife at bedside and per her patient urinated today, he is in no respiratory distress. No urgent need for RRT toady. Will reevaluate in am  - strict Is&Os

## 2024-01-08 NOTE — PROGRESS NOTES
[facility] - Cardiology Stepdown  Endocrinology  Progress Note    Admit Date: 11/9/2023     Reason for Consult: Management of T2DM, Hyperglycemia     Surgical Procedure and Date: n/a    Diabetes diagnosis year: 1998    Home Diabetes Medications:  Metformin (off since October)   Lab Results   Component Value Date    HGBA1C 7.6 (H) 10/12/2023       How often checking glucose at home?  Once daily in the AM    BG readings on regimen: 150-160s  Hypoglycemia on the regimen?  No  Missed doses on regimen?  n/a    Diabetes Complications include:     Hyperglycemia    Complicating diabetes co morbidities:   CAD s/p CABG, HTN, HLD      HPI:   Patient is a 63 y.o. male with a diagnosis of CAD s/p 3v CABG (unclear anatomy, 2009), ICM with a recent EF of 15-20% s/p ICD ([employer] 2009), DM2 (a1c 7.7), HTN, HLD, Vfib on amio who presents to the ED with CC of SOB. In the ED he was AF with stable VS on RA.  CBC showing chronic anemia.  CMP notable for acute on chronic CKD with baseline around 2.1 and Cr now 2.6.  BNP elevated.  Lactate neg.  CXR showing pulmonary edema. He was subsequently admitted to the CCU for diuresis.  Endocrinology consulted for management of T2DM.          Interval HPI:   Overnight events: No acute events overnight. Patient in room 312/312 A. Blood glucose stable. BG at goal on current insulin regimen (Schedule Insulin and SSI (TPN/TF)). Steroid use- None. 35 Days Post-Op  Renal function- Abnormal - Creatinine 5.5   Vasopressors-  None       Endocrine will continue to follow and manage insulin orders inpatient.         Diet Minced & Moist (IDDSI Level 5) Honey Thick (Moderately Thick)     Eating:   <25%  Nausea: No  Hypoglycemia and intervention: No  Fever: No  TPN and/or TF: Yes  If yes, type of TF/TPN and rate: TF @ 45 ml/hr    BP (!) 80/0 (BP Location: Left arm, Patient Position: Lying)   Pulse 93   Temp 98.1 °F (36.7 °C) (Oral)   Resp 18   Ht 6' (1.829 m)   Wt 66.8 kg (147 lb 4.3 oz)   SpO2 99%   BMI  "19.97 kg/m²     Labs Reviewed and Include    Recent Labs   Lab 01/08/24  0615 01/08/24  0621     --    CALCIUM 8.9  --    ALBUMIN  --  2.0*   PROT  --  7.2   *  --    K 3.8  --    CO2 19*  --    CL 98  --    BUN 96*  --    CREATININE 5.5*  --    ALKPHOS  --  183*   ALT  --  31   AST  --  38   BILITOT  --  0.3     Lab Results   Component Value Date    WBC 8.58 01/08/2024    WBC 8.58 01/08/2024    HGB 7.0 (L) 01/08/2024    HGB 7.0 (L) 01/08/2024    HCT 23.2 (L) 01/08/2024    HCT 23.2 (L) 01/08/2024    MCV 91 01/08/2024    MCV 91 01/08/2024     (H) 01/08/2024     (H) 01/08/2024     No results for input(s): "TSH", "FREET4" in the last 168 hours.  Lab Results   Component Value Date    HGBA1C 7.6 (H) 10/12/2023       Nutritional status:   Body mass index is 19.97 kg/m².  Lab Results   Component Value Date    ALBUMIN 2.0 (L) 01/08/2024    ALBUMIN 2.0 (L) 01/07/2024    ALBUMIN 2.0 (L) 01/06/2024     Lab Results   Component Value Date    PREALBUMIN 26 01/08/2024    PREALBUMIN 35 01/05/2024    PREALBUMIN 30 01/03/2024       Estimated Creatinine Clearance: 13.3 mL/min (A) (based on SCr of 5.5 mg/dL (H)).    Accu-Checks  Recent Labs     01/06/24  2335 01/07/24  0424 01/07/24  0810 01/07/24  1210 01/07/24  1622 01/07/24  2022 01/08/24  0045 01/08/24  0438 01/08/24  0439 01/08/24  0740   POCTGLUCOSE 135* 193* 186* 127* 120* 144* 216* >500* 176* 123*       Current Medications and/or Treatments Impacting Glycemic Control  Immunotherapy:    Immunosuppressants       None          Steroids:   Hormones (From admission, onward)      None          Pressors:    Autonomic Drugs (From admission, onward)      None          Hyperglycemia/Diabetes Medications:   Antihyperglycemics (From admission, onward)      Start     Stop Route Frequency Ordered    01/05/24 0800  insulin aspart U-100 pen 6 Units         -- SubQ Every 24 hours (non-standard times) 01/04/24 1214    01/05/24 0400  insulin aspart U-100 pen 4 Units     "     -- SubQ Every 24 hours (non-standard times) 01/04/24 1214    01/05/24 0000  insulin aspart U-100 pen 4 Units         -- SubQ Every 24 hours (non-standard times) 01/04/24 1214    01/04/24 2000  insulin aspart U-100 pen 4 Units         -- SubQ Every 24 hours (non-standard times) 01/04/24 1214    01/04/24 1600  insulin aspart U-100 pen 6 Units         -- SubQ Every 24 hours (non-standard times) 01/04/24 1214    01/04/24 1213  insulin aspart U-100 pen 6 Units         -- SubQ Every 24 hours (non-standard times) 01/04/24 1214    12/31/23 1108  insulin aspart U-100 pen 0-5 Units         -- SubQ Every 4 hours PRN 12/31/23 1008            ASSESSMENT and PLAN    Cardiac/Vascular  * LVAD (left ventricular assist device) present  Optimize BG control to improve wound healing        PAF (paroxysmal atrial fibrillation)  May increase insulin resistance.         Acute on chronic combined systolic and diastolic CHF, NYHA class 4  Managed per primary team  Optimize BG control  S/p LVAD     Renal/  Acute renal failure with acute tubular necrosis superimposed on stage 3b chronic kidney disease  Lab Results   Component Value Date    CREATININE 5.5 (H) 01/08/2024     Avoid insulin stacking  Titrate insulin slowly       Endocrine  Type 2 diabetes mellitus without complication, without long-term current use of insulin  BG goal 140-180. TF @ 45 ml/hr.     - Novolog 6 units during 0800, 1200, and 1600 BG checks to cover additional intake. -hold if TF held or BG < 100   - Novolog 4 units 2000, 0000, 0400 to cover TF- hold if TF held or BG < 100   - LDC (150/50) prn q4hr   - POCT Glucose every 4 hours  - Hypoglycemia protocol in place      ** Please notify Endocrine for any change and/or advance in diet**  ** Please call Endocrine for any BG related issues **     Discharge Planning:   TBD. Please notify endocrinology prior to discharge.                  Erasmo Parrish, DNP, FNP  Endocrinology  [facility] - Cardiology Stepdown

## 2024-01-08 NOTE — PROGRESS NOTES
[facility] - Cardiology Stepdown  Adult Nutrition  Progress Note    SUMMARY       Recommendations   1) Continue current minced and moist diet as tolerated per SLP recs with honey-thick liquids - encourage PO intake  2) If enteral nutrition continues, continue current TF regimen as tolerated, Novasource Renal @ 45 mL/hr 2160 kcal, 98g PRO, and 774ml FF- FWF per MD   - monitor for s/s of intolerance such as residuals >500ml, n/v/d, or abdominal distension  - will monitor need to decrease TF rate if PO intake improves     3) Continue BenePRO BID if CRRT continues (PRO needs will be higher), provides 12g PRO  4) Continue fiber for diarrhea as needed  5) RD to monitor wt, PO intake/tolerance, TF rate/tolerance, skin, labs    Goals: Meet % of EEN/EPN by RD f/u date  Nutrition Goal Status: progressing towards goal  Communication of RD Recs:  (POC)    Assessment and Plan    Endocrine  Moderate malnutrition  Malnutrition Type:  Context: acute illness or injury  Level: moderate    Related to (etiology):   Inadequate nutrition intake    Signs and Symptoms (as evidenced by):   Nutritional intake <75% x 7days, observed orbital wasting and trace edema present.     Malnutrition Characteristic Summary:  Energy Intake (Malnutrition): less than 75% for greater than 7 days  Fluid Accumulation (Malnutrition):  (Trace edema)      Interventions/Recommendations (treatment strategy):  Collaboration of nutritional care with other providers.  EN    Nutrition Diagnosis Status:   Continues       Malnutrition Assessment  Malnutrition Context: acute illness or injury  Malnutrition Level: moderate          Energy Intake (Malnutrition): less than 75% for greater than 7 days  Fluid Accumulation (Malnutrition):  (Trace edema)   Orbital Region (Subcutaneous Fat Loss): mild depletion   [place] (Muscle Loss): moderate depletion            Reason for Assessment    Reason For Assessment: RD follow-up  Diagnosis: cardiac disease (CHF/ s/p LVAD  on 12/1)  Relevant Medical History: CAD, DMT2, CHF, PAF  Interdisciplinary Rounds: did not attend  General Information Comments: Pt is currently on a M+M- honey thick liquids- tolerating 0-25% meal consumption. Pt also consuming Boost w/ meals. NG tube in place and pt receiving TF. RD following.   Nutrition Discharge Planning: Cardiac diet education provided 11/15- no additional questions for me at this time.    Nutrition Risk Screen    Nutrition Risk Screen: tube feeding or parenteral nutrition    Nutrition/Diet History    Spiritual, Cultural Beliefs, Orthodox Practices, Values that Affect Care: no  Food Allergies: NKFA    Anthropometrics    Temp: 97.5 °F (36.4 °C)  Height Method: Stated  Height: 6' (182.9 cm)  Height (inches): 72 in  Weight Method: Standard Scale  Weight: 66.8 kg (147 lb 4.3 oz)  Weight (lb): 147.27 lb  Ideal Body Weight (IBW), Male: 178 lb  % Ideal Body Weight, Male (lb): 101.69 %  BMI (Calculated): 20  BMI Grade: 18.5-24.9 - normal       Lab/Procedures/Meds    Pertinent Labs Reviewed: reviewed  Pertinent Labs Comments: Hgb: 6.5, Hct: 19.8, Na: 134, BUN: 74, Creatinine: 4.4, eGFR: 14.5, Glucose: 162, Calcium: 8.5, Phosphorus: 1.9, Albumin: 1.9  Pertinent Medications Reviewed: reviewed  Pertinent Medications Comments: Famotidine, gabapentin, insulin, polyethylene glycol, psyllium husk, warfarin, senna-doc    Estimated/Assessed Needs    Weight Used For Calorie Calculations: 81 kg (178 lb 9.2 oz) (IBW d/t edema present)  Energy Calorie Requirements (kcal): 2025- 2430 kcal  Energy Need Method: Kcal/kg (25-30 kcal/ kg of IBW)  Protein Requirements: 97- 122g (1.2-1.5g/kg of IBW)  Weight Used For Protein Calculations: 81 kg (178 lb 9.2 oz) (IBW d/t edema present.)  Fluid Requirements (mL): 1 ml/kcal or per MD     RDA Method (mL): 2025       Nutrition Prescription Ordered    Current Diet Order: Minced and moist  Nutrition Order Comments: Beneprotein BID via NGT  Current Nutrition Support Formula  Ordered: Novasource Renal    Evaluation of Received Nutrient/Fluid Intake    I/O: + 5.9 L since 12/21  Energy Calories Required:  not meeting needs  Protein Required: not meeting needs  Fluid Required:  (As per MD)  Total Fluid Intake (mL/kg): 1 ml/kcal or per MD  Tolerance: tolerating  % Intake of Estimated Energy Needs: 25 - 50 %  % Meal Intake: 25 - 50 %    Nutrition Risk    Level of Risk/Frequency of Follow-up:  (Follow-up 1-2x/week)     Monitor and Evaluation    Food and Nutrient Intake: energy intake, food and beverage intake, enteral nutrition intake, parenteral nutrition intake  Food and Nutrient Adminstration: diet order, enteral and parenteral nutrition administration  Knowledge/Beliefs/Attitudes: food and nutrition knowledge/skill, beliefs and attitudes  Physical Activity and Function: nutrition-related ADLs and IADLs, factors affecting access to physical activity  Anthropometric Measurements: height/length, weight, weight change, body mass index, growth pattern indices/percentile ranks  Biochemical Data, Medical Tests and Procedures: electrolyte and renal panel, gastrointestinal profile, glucose/endocrine profile, inflammatory profile, lipid profile  Nutrition-Focused Physical Findings: overall appearance, extremities, muscles and bones, head and eyes, skin     Nutrition Follow-Up    RD Follow-up?: Yes

## 2024-01-08 NOTE — PLAN OF CARE
Pt educated on fall risk, pt remained free from falls/trauma/injury. Denies chest pain, SOB, palpitations or dizziness.  LVAD setting WNL and no alarms present. Plan of care reviewed with pt. Bed locked in lowest position, call bell within reach, no acute distress noted, will continue to monitor.         Problem: Adult Inpatient Plan of Care  Goal: Plan of Care Review  Outcome: Ongoing, Progressing  Goal: Patient-Specific Goal (Individualized)  Outcome: Ongoing, Progressing  Goal: Absence of Hospital-Acquired Illness or Injury  Outcome: Ongoing, Progressing  Goal: Optimal Comfort and Wellbeing  Outcome: Ongoing, Progressing  Goal: Readiness for Transition of Care  Outcome: Ongoing, Progressing     Problem: Diabetes Comorbidity  Goal: Blood Glucose Level Within Targeted Range  Outcome: Ongoing, Progressing     Problem: Fluid and Electrolyte Imbalance (Acute Kidney Injury/Impairment)  Goal: Fluid and Electrolyte Balance  Outcome: Ongoing, Progressing     Problem: Oral Intake Inadequate (Acute Kidney Injury/Impairment)  Goal: Optimal Nutrition Intake  Outcome: Ongoing, Progressing     Problem: Renal Function Impairment (Acute Kidney Injury/Impairment)  Goal: Effective Renal Function  Outcome: Ongoing, Progressing     Problem: Infection  Goal: Absence of Infection Signs and Symptoms  Outcome: Ongoing, Progressing     Problem: Adjustment to Illness (Heart Failure)  Goal: Optimal Coping  Outcome: Ongoing, Progressing     Problem: Cardiac Output Decreased (Heart Failure)  Goal: Optimal Cardiac Output  Outcome: Ongoing, Progressing     Problem: Dysrhythmia (Heart Failure)  Goal: Stable Heart Rate and Rhythm  Outcome: Ongoing, Progressing     Problem: Functional Ability Impaired (Heart Failure)  Goal: Optimal Functional Ability  Outcome: Ongoing, Progressing     Problem: Oral Intake Inadequate (Heart Failure)  Goal: Optimal Nutrition Intake  Outcome: Ongoing, Progressing     Problem: Fall Injury Risk  Goal: Absence of Fall  and Fall-Related Injury  Outcome: Ongoing, Progressing     Problem: Skin Injury Risk Increased  Goal: Skin Health and Integrity  Outcome: Ongoing, Progressing     Problem: Adjustment to Device (Ventricular Assist Device)  Goal: Optimal Adjustment to Device  Outcome: Ongoing, Progressing

## 2024-01-08 NOTE — PROGRESS NOTES
[facility] - Cardiology Stepdown  Nephrology  Progress Note    Patient Name: Radha Abbott  MRN: [id number]  Admission Date: 11/9/2023  Hospital Length of Stay: 60 days  Attending Provider: Sandhya Price MD   Primary Care Physician: Vasu Kong MD  Principal Problem:LVAD (left ventricular assist device) present    Subjective:       Interval History:   No distress on exam. sCr/BUN worsening today. 24 hr  mL.  Net positive 1.2 L/24 hrs.   Last HD 1/4.    Review of patient's allergies indicates:  No Known Allergies  Current Facility-Administered Medications   Medication Frequency    0.9%  NaCl infusion Continuous    acetaminophen tablet 1,000 mg TID    amiodarone tablet 400 mg Daily    balsam peru-castor oiL Oint BID    bisacodyL suppository 10 mg Daily PRN    cholecalciferol (vitamin D3) 400 units/mL oral liquid Daily    dextrose 10 % infusion Continuous PRN    dextrose 10% bolus 125 mL 125 mL PRN    dextrose 10% bolus 125 mL 125 mL PRN    dextrose 10% bolus 250 mL 250 mL PRN    dextrose 10% bolus 250 mL 250 mL PRN    epoetin zohaib injection 8,100 Units Every Tues, Thurs, Sat    famotidine tablet 20 mg Daily    gabapentin 250 mg/5 mL (5 mL) solution 125 mg Q12H    glucagon (human recombinant) injection 1 mg PRN    heparin (porcine) injection 1,000 Units PRN    hydrALAZINE injection 10 mg Q6H PRN    insulin aspart U-100 pen 0-5 Units Q4H PRN    insulin aspart U-100 pen 4 Units Q24H    insulin aspart U-100 pen 4 Units Q24H    insulin aspart U-100 pen 4 Units Q24H    insulin aspart U-100 pen 6 Units Q24H    insulin aspart U-100 pen 6 Units Q24H    insulin aspart U-100 pen 6 Units Q24H    ondansetron injection 4 mg Q6H PRN    oseltamivir capsule 30 mg Every Tues, Thurs, Sat    piperacillin-tazobactam (ZOSYN) 4.5 g in dextrose 5 % in water (D5W) 100 mL IVPB (MB+) Q12H    polyethylene glycol packet 17 g Daily    psyllium husk (aspartame) 3.4 gram packet 1 packet Daily    senna-docusate 8.6-50 mg per tablet 2  tablet Daily    sodium chloride 0.9% flush 10 mL PRN    traMADoL tablet 50 mg Q8H PRN    traMADoL tablet 50 mg Once    traZODone tablet 50 mg QHS    venlafaxine tablet 37.5 mg Daily    warfarin (COUMADIN) tablet 2.5 mg Every Mon, Wed, Fri    warfarin (COUMADIN) tablet 5 mg Every Tues, Thurs, Sat, Sun    zolpidem tablet 5 mg Nightly PRN       Objective:     Vital Signs (Most Recent):  Temp: 98.1 °F (36.7 °C) (01/08/24 0732)  Pulse: 73 (01/08/24 1120)  Resp: 18 (01/08/24 0732)  BP: (!) 80/0 (01/08/24 0738)  SpO2: 99 % (01/08/24 0732) Vital Signs (24h Range):  Temp:  [97.4 °F (36.3 °C)-98.3 °F (36.8 °C)] 98.1 °F (36.7 °C)  Pulse:  [] 73  Resp:  [18] 18  SpO2:  [95 %-99 %] 99 %  BP: ()/(0-81) 80/0     Weight: 66.8 kg (147 lb 4.3 oz) (01/06/24 0915)  Body mass index is 19.97 kg/m².  Body surface area is 1.84 meters squared.    I/O last 3 completed shifts:  In: 2557 [P.O.:777; NG/GT:1780]  Out: 460 [Urine:460]     Physical Exam  Vitals and nursing note reviewed.   HENT:      Head: Normocephalic and atraumatic.      Mouth/Throat:      Mouth: Mucous membranes are dry.   Eyes:      Conjunctiva/sclera: Conjunctivae normal.   Cardiovascular:      Comments: LVAD   Pulmonary:      Effort: Pulmonary effort is normal. No respiratory distress.      Breath sounds: No wheezing or rales.      Comments: RA  Abdominal:      Palpations: Abdomen is soft.   Musculoskeletal:         General: No swelling.      Right lower leg: No edema.      Left lower leg: No edema.   Skin:     General: Skin is warm.   Neurological:      Mental Status: He is alert and oriented to person, place, and time.   Psychiatric:         Mood and Affect: Mood normal.         Behavior: Behavior normal.          Significant Labs:  CBC:   Recent Labs   Lab 01/08/24  0819   WBC 9.71   RBC 2.38*   HGB 6.7*   HCT 21.1*   *   MCV 89   MCH 28.2   MCHC 31.8*     CMP:   Recent Labs   Lab 01/08/24  0615 01/08/24  0621     --    CALCIUM 8.9  --     ALBUMIN  --  2.0*   PROT  --  7.2   *  --    K 3.8  --    CO2 19*  --    CL 98  --    BUN 96*  --    CREATININE 5.5*  --    ALKPHOS  --  183*   ALT  --  31   AST  --  38   BILITOT  --  0.3     All labs within the past 24 hours have been reviewed.     Assessment/Plan:     Cardiac/Vascular  * LVAD (left ventricular assist device) present  -management per primary     Acute on chronic combined systolic and diastolic CHF, NYHA class 4  - management per primary team  - LVAD for mechanical support    Renal/  Acute renal failure with acute tubular necrosis superimposed on stage 3b chronic kidney disease  - suspect acute tubular injury secondary to hypotension/cardiogenic shock likely compounded by intra-arterial contrast and anemia with urinary sediment with granular casts    -No need for further RRT today  -Recommend Lasix 160 mg IV x 1.   -Hgb 7.0. Continue EPO with HD  -Continue bladder scans q shift  -strict I/O's  -renal diet/tube feeds when not NPO, volume restriction per primary team  -renally all dose medications to eGFR  -avoid nephrotoxic agents wean feasible (i.e. NSAIDs, intra-arterial contrast, supra-therapeutic vancomycin levels, etc.)          Thank you for your consult. I will follow-up with patient. Please contact us if you have any additional questions.    Matilda Hinton DNP, FNP-C  Nephrology  [facility] - Cardiology Stepdown

## 2024-01-08 NOTE — PLAN OF CARE
AAOX4,VSS,O2 sats>92% on room air. Plan of care discussed with patient and wife. Patient has no complaints of pain/SOB. Patient on novasource renal tube feedings 45 mL/hr. Patient LVAD numbers WNL, no alarms. Patient and wife documenting in LVAD binder. Discussed medications and care. Patient has no questions at this time.Pt visualised and stable.Call light within reach.Pt resting,bed at lowest position.    Problem: Adult Inpatient Plan of Care  Goal: Plan of Care Review  Outcome: Ongoing, Progressing  Goal: Patient-Specific Goal (Individualized)  Outcome: Ongoing, Progressing  Goal: Absence of Hospital-Acquired Illness or Injury  Outcome: Ongoing, Progressing  Goal: Optimal Comfort and Wellbeing  Outcome: Ongoing, Progressing  Goal: Readiness for Transition of Care  Outcome: Ongoing, Progressing     Problem: Diabetes Comorbidity  Goal: Blood Glucose Level Within Targeted Range  Outcome: Ongoing, Progressing     Problem: Fluid and Electrolyte Imbalance (Acute Kidney Injury/Impairment)  Goal: Fluid and Electrolyte Balance  Outcome: Ongoing, Progressing     Problem: Oral Intake Inadequate (Acute Kidney Injury/Impairment)  Goal: Optimal Nutrition Intake  Outcome: Ongoing, Progressing     Problem: Renal Function Impairment (Acute Kidney Injury/Impairment)  Goal: Effective Renal Function  Outcome: Ongoing, Progressing     Problem: Infection  Goal: Absence of Infection Signs and Symptoms  Outcome: Ongoing, Progressing     Problem: Adjustment to Illness (Heart Failure)  Goal: Optimal Coping  Outcome: Ongoing, Progressing     Problem: Cardiac Output Decreased (Heart Failure)  Goal: Optimal Cardiac Output  Outcome: Ongoing, Progressing     Problem: Dysrhythmia (Heart Failure)  Goal: Stable Heart Rate and Rhythm  Outcome: Ongoing, Progressing     Problem: Fluid Imbalance (Heart Failure)  Goal: Fluid Balance  Outcome: Ongoing, Progressing     Problem: Functional Ability Impaired (Heart Failure)  Goal: Optimal Functional  Ability  Outcome: Ongoing, Progressing     Problem: Oral Intake Inadequate (Heart Failure)  Goal: Optimal Nutrition Intake  Outcome: Ongoing, Progressing     Problem: Respiratory Compromise (Heart Failure)  Goal: Effective Oxygenation and Ventilation  Outcome: Ongoing, Progressing     Problem: Sleep Disordered Breathing (Heart Failure)  Goal: Effective Breathing Pattern During Sleep  Outcome: Ongoing, Progressing     Problem: Cardiac Output Decreased  Goal: Effective Cardiac Output  Outcome: Ongoing, Progressing     Problem: Fall Injury Risk  Goal: Absence of Fall and Fall-Related Injury  Outcome: Ongoing, Progressing     Problem: Coping Ineffective  Goal: Effective Coping  Outcome: Ongoing, Progressing     Problem: Skin Injury Risk Increased  Goal: Skin Health and Integrity  Outcome: Ongoing, Progressing     Problem: Adjustment to Device (Ventricular Assist Device)  Goal: Optimal Adjustment to Device  Outcome: Ongoing, Progressing     Problem: Bleeding (Ventricular Assist Device)  Goal: Absence of Bleeding  Outcome: Ongoing, Progressing     Problem: Embolism (Ventricular Assist Device)  Goal: Absence of Embolism Signs and Symptoms  Outcome: Ongoing, Progressing     Problem: Hemodynamic Instability (Ventricular Assist Device)  Goal: Optimal Blood Flow  Outcome: Ongoing, Progressing     Problem: Infection (Ventricular Assist Device)  Goal: Absence of Infection Signs and Symptoms  Outcome: Ongoing, Progressing     Problem: Right-Sided Heart Compromise (Ventricular Assist Device)  Goal: Effective Right-Sided Heart Function  Outcome: Ongoing, Progressing     Problem: Adjustment to Illness (Sepsis/Septic Shock)  Goal: Optimal Coping  Outcome: Ongoing, Progressing     Problem: Bleeding (Sepsis/Septic Shock)  Goal: Absence of Bleeding  Outcome: Ongoing, Progressing     Problem: Glycemic Control Impaired (Sepsis/Septic Shock)  Goal: Blood Glucose Level Within Desired Range  Outcome: Ongoing, Progressing     Problem:  Infection Progression (Sepsis/Septic Shock)  Goal: Absence of Infection Signs and Symptoms  Outcome: Ongoing, Progressing     Problem: Nutrition Impaired (Sepsis/Septic Shock)  Goal: Optimal Nutrition Intake  Outcome: Ongoing, Progressing     Problem: Impaired Wound Healing  Goal: Optimal Wound Healing  Outcome: Ongoing, Progressing     Problem: Device-Related Complication Risk (Hemodialysis)  Goal: Safe, Effective Therapy Delivery  Outcome: Ongoing, Progressing     Problem: Hemodynamic Instability (Hemodialysis)  Goal: Effective Tissue Perfusion  Outcome: Ongoing, Progressing     Problem: Infection (Hemodialysis)  Goal: Absence of Infection Signs and Symptoms  Outcome: Ongoing, Progressing

## 2024-01-08 NOTE — PROGRESS NOTES
01/08/2024  Deni Frye    Current provider:  Sandhya Price MD    Device interrogation:      1/8/2024    11:57 AM 1/8/2024     7:30 AM 1/8/2024     4:00 AM 1/8/2024    12:00 AM 1/7/2024     7:39 PM 1/7/2024     4:00 PM 1/7/2024    12:00 PM   TXP LVAD INTERROGATIONS   Type HeartMate3 HeartMate3   HeartMate3 HeartMate3 HeartMate3   Flow 4 3.6 3.8 3.8 4 4 4   Speed 4900 4900 4900 5900 4900 4900 4900   PI 4.4 5.9 4.4 4.8 5.1 4 4.1   Power ([place]) 3.3 3.3 3.3 3.3 3.3 3.2 3.2   LSL 4500 4500  4500 4500 4500 4500   Low Flow Alarm no no  no no no    High Power Alarm no no  no no     Pulsatility Intermittent pulse Intermittent pulse    Pulse Pulse          Rounded on [facility] to ensure all mechanical assist device settings (IABP or VAD) were appropriate and all parameters were within limits.  I was able to ensure all back up equipment was present, the staff had no issues, and the Perfusion Department daily rounding was complete.      For implantable VADs: Interrogation of Ventricular assist device was performed with analysis of device parameters and review of device function. I have personally reviewed the interrogation findings and agree with findings as stated.     In emergency, the nursing units have been notified to contact the perfusion department either by:  Calling [phone] from 630am to 4pm Mon thru Fri, utilizing the On-Call Finder functionality of Epic and searching for Perfusion, or by contacting the hospital  from 4pm to 630am and on weekends and asking to speak with the perfusionist on call.    3:06 PM

## 2024-01-08 NOTE — PROGRESS NOTES
01/08/24 0615   Invasive Hemodynamic Monitoring   CVP (mmHg)   (unable to perform due to slugglish PICC Line)     Unable to complete CVP due to sluggish PICC line. Notified Dr Omid Manzanares.

## 2024-01-08 NOTE — SUBJECTIVE & OBJECTIVE
Interval History:   No distress on exam. sCr/BUN worsening today. 24 hr  mL.  Net positive 1.2 L/24 hrs.   Last HD 1/4.    Review of patient's allergies indicates:  No Known Allergies  Current Facility-Administered Medications   Medication Frequency    0.9%  NaCl infusion Continuous    acetaminophen tablet 1,000 mg TID    amiodarone tablet 400 mg Daily    balsam peru-castor oiL Oint BID    bisacodyL suppository 10 mg Daily PRN    cholecalciferol (vitamin D3) 400 units/mL oral liquid Daily    dextrose 10 % infusion Continuous PRN    dextrose 10% bolus 125 mL 125 mL PRN    dextrose 10% bolus 125 mL 125 mL PRN    dextrose 10% bolus 250 mL 250 mL PRN    dextrose 10% bolus 250 mL 250 mL PRN    epoetin zohaib injection 8,100 Units Every Tues, Thurs, Sat    famotidine tablet 20 mg Daily    gabapentin 250 mg/5 mL (5 mL) solution 125 mg Q12H    glucagon (human recombinant) injection 1 mg PRN    heparin (porcine) injection 1,000 Units PRN    hydrALAZINE injection 10 mg Q6H PRN    insulin aspart U-100 pen 0-5 Units Q4H PRN    insulin aspart U-100 pen 4 Units Q24H    insulin aspart U-100 pen 4 Units Q24H    insulin aspart U-100 pen 4 Units Q24H    insulin aspart U-100 pen 6 Units Q24H    insulin aspart U-100 pen 6 Units Q24H    insulin aspart U-100 pen 6 Units Q24H    ondansetron injection 4 mg Q6H PRN    oseltamivir capsule 30 mg Every Tues, Thurs, Sat    piperacillin-tazobactam (ZOSYN) 4.5 g in dextrose 5 % in water (D5W) 100 mL IVPB (MB+) Q12H    polyethylene glycol packet 17 g Daily    psyllium husk (aspartame) 3.4 gram packet 1 packet Daily    senna-docusate 8.6-50 mg per tablet 2 tablet Daily    sodium chloride 0.9% flush 10 mL PRN    traMADoL tablet 50 mg Q8H PRN    traMADoL tablet 50 mg Once    traZODone tablet 50 mg QHS    venlafaxine tablet 37.5 mg Daily    warfarin (COUMADIN) tablet 2.5 mg Every Mon, Wed, Fri    warfarin (COUMADIN) tablet 5 mg Every Tues, Thurs, Sat, Sun    zolpidem tablet 5 mg Nightly PRN        Objective:     Vital Signs (Most Recent):  Temp: 98.1 °F (36.7 °C) (01/08/24 0732)  Pulse: 73 (01/08/24 1120)  Resp: 18 (01/08/24 0732)  BP: (!) 80/0 (01/08/24 0738)  SpO2: 99 % (01/08/24 0732) Vital Signs (24h Range):  Temp:  [97.4 °F (36.3 °C)-98.3 °F (36.8 °C)] 98.1 °F (36.7 °C)  Pulse:  [] 73  Resp:  [18] 18  SpO2:  [95 %-99 %] 99 %  BP: ()/(0-81) 80/0     Weight: 66.8 kg (147 lb 4.3 oz) (01/06/24 0915)  Body mass index is 19.97 kg/m².  Body surface area is 1.84 meters squared.    I/O last 3 completed shifts:  In: 2557 [P.O.:777; NG/GT:1780]  Out: 460 [Urine:460]     Physical Exam  Vitals and nursing note reviewed.   HENT:      Head: Normocephalic and atraumatic.      Mouth/Throat:      Mouth: Mucous membranes are dry.   Eyes:      Conjunctiva/sclera: Conjunctivae normal.   Cardiovascular:      Comments: LVAD   Pulmonary:      Effort: Pulmonary effort is normal. No respiratory distress.      Breath sounds: No wheezing or rales.      Comments: RA  Abdominal:      Palpations: Abdomen is soft.   Musculoskeletal:         General: No swelling.      Right lower leg: No edema.      Left lower leg: No edema.   Skin:     General: Skin is warm.   Neurological:      Mental Status: He is alert and oriented to person, place, and time.   Psychiatric:         Mood and Affect: Mood normal.         Behavior: Behavior normal.          Significant Labs:  CBC:   Recent Labs   Lab 01/08/24  0819   WBC 9.71   RBC 2.38*   HGB 6.7*   HCT 21.1*   *   MCV 89   MCH 28.2   MCHC 31.8*     CMP:   Recent Labs   Lab 01/08/24  0615 01/08/24  0621     --    CALCIUM 8.9  --    ALBUMIN  --  2.0*   PROT  --  7.2   *  --    K 3.8  --    CO2 19*  --    CL 98  --    BUN 96*  --    CREATININE 5.5*  --    ALKPHOS  --  183*   ALT  --  31   AST  --  38   BILITOT  --  0.3     All labs within the past 24 hours have been reviewed.

## 2024-01-08 NOTE — PROGRESS NOTES
Patient was seen today in the hospital. Patient awake at time of visit. No family at bedside. Patient educated on VAD equipment. MPU, UBC, batteries, emergency bag, and equipment cleaning were all covered. Patient is progressing well with his overall understanding of the VAD equipment. Will come by later this week for more education.

## 2024-01-08 NOTE — NURSING
Patient had critical lab results of of Hemoglobin  5.6 and Hematocrit 18. PICC line blood return for AM lab draw was sluggish. Notified Dr Omid Manzanares. Order to repeat draw as lab collect stat placed to verify lab values.  All lab work submitted for lab collect redraw. Will continue to monitor.

## 2024-01-08 NOTE — PROGRESS NOTES
[facility] - Cardiology Stepdown  Heart Transplant  Progress Note    Patient Name: Radha Abobtt  MRN: [id number]  Admission Date: 11/9/2023  Hospital Length of Stay: 60 days  Attending Physician: Sandhya Price MD  Primary Care Provider: Vasu Kong MD  Principal Problem:LVAD (left ventricular assist device) present    Subjective:   Interval History: NAEON. No acute complaints. Has not had HD since 1/4. Total  cc/24 hours, will give 160 mg IVP Lasix and monitor response.     Continuous Infusions:   sodium chloride 0.9%      dextrose 10 % in water (D10W)       Scheduled Meds:   acetaminophen  1,000 mg Per NG tube TID    amiodarone  400 mg Per NG tube Daily    balsam peru-castor oiL   Topical (Top) BID    cholecalciferol (vitamin D3)  2,000 Units Per NG tube Daily    epoetin zohaib (PROCRIT) injection  50 Units/kg Subcutaneous Every Tues, Thurs, Sat    famotidine  20 mg Per G Tube Daily    gabapentin  125 mg Per NG tube Q12H    insulin aspart U-100  4 Units Subcutaneous Q24H    insulin aspart U-100  4 Units Subcutaneous Q24H    insulin aspart U-100  4 Units Subcutaneous Q24H    insulin aspart U-100  6 Units Subcutaneous Q24H    insulin aspart U-100  6 Units Subcutaneous Q24H    insulin aspart U-100  6 Units Subcutaneous Q24H    oseltamivir  30 mg Oral Every Tues, Thurs, Sat    piperacillin-tazobactam (Zosyn) IV (PEDS and ADULTS) (extended infusion is not appropriate)  4.5 g Intravenous Q12H    polyethylene glycol  17 g Per NG tube Daily    psyllium husk (aspartame)  1 packet Per NG tube Daily    senna-docusate 8.6-50 mg  2 tablet Per NG tube Daily    traMADoL  50 mg Oral Once    traZODone  50 mg Per NG tube QHS    venlafaxine  37.5 mg Per G Tube Daily     PRN Meds:bisacodyL, dextrose 10 % in water (D10W), dextrose 10%, dextrose 10%, dextrose 10%, dextrose 10%, glucagon (human recombinant), heparin (porcine), hydrALAZINE, insulin aspart U-100, ondansetron, Flushing PICC/Midline Protocol **AND**  [DISCONTINUED] sodium chloride 0.9% **AND** sodium chloride 0.9%, traMADoL, zolpidem    Review of patient's allergies indicates:  No Known Allergies  Objective:     Vital Signs (Most Recent):  Temp: 97.5 °F (36.4 °C) (01/08/24 1146)  Pulse: 60 (01/08/24 1146)  Resp: 18 (01/08/24 0732)  BP: (!) 72/0 (01/08/24 1146)  SpO2: 100 % (01/08/24 1146) Vital Signs (24h Range):  Temp:  [97.4 °F (36.3 °C)-98.3 °F (36.8 °C)] 97.5 °F (36.4 °C)  Pulse:  [] 60  Resp:  [18] 18  SpO2:  [95 %-100 %] 100 %  BP: ()/(0-81) 72/0     Patient Vitals for the past 72 hrs (Last 3 readings):   Weight   01/06/24 0915 66.8 kg (147 lb 4.3 oz)       Body mass index is 19.97 kg/m².      Intake/Output Summary (Last 24 hours) at 1/8/2024 1155  Last data filed at 1/8/2024 0900  Gross per 24 hour   Intake 2012 ml   Output 460 ml   Net 1552 ml         Hemodynamic Parameters:  CVP:  [8 mmHg] 8 mmHg    Telemetry: SR       Physical Exam  Constitutional:       Comments: Cachectic, temporal wasting   HENT:      Head: Normocephalic and atraumatic.   Eyes:      Conjunctiva/sclera: Conjunctivae normal.      Pupils: Pupils are equal, round, and reactive to light.   Neck:      Comments: Do not appreciate elevated JVP  Cardiovascular:      Rate and Rhythm: Normal rate and regular rhythm.      Comments: Smooth VAD hum  Pulmonary:      Effort: Pulmonary effort is normal.      Breath sounds: Normal breath sounds.   Abdominal:      General: Bowel sounds are normal.      Palpations: Abdomen is soft.   Musculoskeletal:         General: No swelling. Normal range of motion.      Cervical back: Normal range of motion and neck supple.   Skin:     General: Skin is warm and dry.      Capillary Refill: Capillary refill takes 2 to 3 seconds.   Neurological:      General: No focal deficit present.      Mental Status: He is alert and oriented to person, place, and time.            Significant Labs:  CBC:  Recent Labs   Lab 01/08/24  0433 01/08/24  0614 01/08/24  0819    WBC 7.81 8.58  8.58 9.71   RBC 1.97* 2.54*  2.54* 2.38*   HGB 5.6* 7.0*  7.0* 6.7*   HCT 18.0* 23.2*  23.2* 21.1*    471*  471* 477*   MCV 91 91  91 89   MCH 28.4 27.6  27.6 28.2   MCHC 31.1* 30.2*  30.2* 31.8*       BNP:  Recent Labs   Lab 01/03/24  0500 01/05/24  1059 01/08/24  0433   * 670* 652*       CMP:  Recent Labs   Lab 01/06/24  0426 01/07/24  0849 01/08/24  0615 01/08/24  0621   * 171* 107  --    CALCIUM 8.8 8.9 8.9  --    ALBUMIN 2.0* 2.0*  --  2.0*   PROT 7.3 7.3  --  7.2    133* 132*  --    K 4.1 4.0 3.8  --    CO2 23 20* 19*  --     99 98  --    BUN 60* 77* 96*  --    CREATININE 4.3* 4.9* 5.5*  --    ALKPHOS 181* 185*  --  183*   ALT 38 35  --  31   AST 56* 43*  --  38   BILITOT 0.3 0.3  --  0.3        Coagulation:   Recent Labs   Lab 01/06/24  0426 01/07/24  0559 01/08/24  0614   INR 2.3* 2.7* 3.3*       LDH:  Recent Labs   Lab 01/06/24  0426 01/07/24  0559 01/07/24  0849   * 510* 356*       Microbiology:  Microbiology Results (last 7 days)       Procedure Component Value Units Date/Time    Throat culture [[id number]]  (Abnormal)  (Susceptibility) Collected: 01/04/24 1942    Order Status: Completed Specimen: Throat Updated: 01/08/24 1151     RESPIRATORY CULTURE - THROAT PSEUDOMONAS AERUGINOSA   Many  Normal respiratory bobby also present      Influenza A & B by Molecular [[id number]] Collected: 01/05/24 1607    Order Status: Completed Specimen: Nasopharyngeal Swab Updated: 01/05/24 1647     Influenza A, Molecular Negative     Influenza B, Molecular Negative     Flu A & B Source NP    Group A Strep, Molecular [[id number]] Collected: 01/05/24 1004    Order Status: Completed Specimen: Throat Updated: 01/05/24 1039     Group A Strep, Molecular Negative     Comment: Arcanobacterium haemolyticum and Beta Streptococcus group C   and G will not be detected by this test method.  Please order   Throat Culture ([id number]) if suspected.         Respiratory  Infection Panel (PCR), Nasopharyngeal [[id number]] Collected: 01/04/24 1942    Order Status: Completed Specimen: Nasopharyngeal Swab Updated: 01/04/24 2101     Respiratory Infection Panel Source NP Swab     Adenovirus Not Detected     Coronavirus 229E, Common Cold Virus Not Detected     Coronavirus HKU1, Common Cold Virus Not Detected     Coronavirus NL63, Common Cold Virus Not Detected     Coronavirus OC43, Common Cold Virus Not Detected     Comment: The Coronavirus strains detected in this test cause the common cold.  These strains are not the COVID-19 (novel Coronavirus)strain   associated with the respiratory disease outbreak.          SARS-CoV2 (COVID-19) Qualitative PCR Not Detected     Human Metapneumovirus Not Detected     Human Rhinovirus/Enterovirus Not Detected     Influenza A (subtypes H1, H1-2009,H3) Not Detected     Influenza B Not Detected     Parainfluenza Virus 1 Not Detected     Parainfluenza Virus 2 Not Detected     Parainfluenza Virus 3 Not Detected     Parainfluenza Virus 4 Not Detected     Respiratory Syncytial Virus Not Detected     Bordetella Parapertussis ([id number]) Not Detected     Bordetella pertussis (ptxP) Not Detected     Chlamydia pneumoniae Not Detected     Mycoplasma pneumoniae Not Detected    Narrative:      For all other respiratory sources, order [id number] -  Respiratory Viral Panel by PCR            I have reviewed all pertinent labs within the past 24 hours.    Estimated Creatinine Clearance: 13.3 mL/min (A) (based on SCr of 5.5 mg/dL (H)).    Diagnostic Results:  I have reviewed and interpreted all pertinent imaging results/findings within the past 24 hours.  Assessment and Plan:     63 year old male with hx of CAD s/p 3v CABG (unclear anatomy, 2009), ICM with a recent EF of 15-20% s/p ICD (medtronik 2009), DM2 (a1c 7.7), HTN, HLD, Vfib on amio who presents to the ED with CC of SOB     [place] [facility] [place].  He was started on a Lasix gtt and did well.   Started on gDMT and discharged home on  5 with plans to follow up in [facility] clinic for ongoing transplant evaluation at another facility.  He says that about a few days ago he started to notice LE swelling.  This turned into Nelson and orthopnea.  He says he can't walk to the bathroom without getting SOB.  He also complains of weight gain.  He takes torsemide 20mg BID at home and was told to trial additional Lasix which he did without any improvement.  He was rx metolazone but has not been filled.  He came to the ED     In the ED he was AF with stable VS on RA.  CBC showing chronic anemia.  CMP notable for acute on chronic CKD with baseline around 2.1 and Cr now 2.6.  BNP elevated.  Lactate neg.  CXR showing pulmonary edema.  I evaluated the pt at the bedside.  Bedside TTE showing CVP >15.  He was subsequently admitted to the CCU for diuresis.     He was continued on his home  and was started on a lasix gtt, which he responded well to overnight (net -1700cc. He feels much better this morning as well. Our transplant coordinators have been working on insurance approval and he is now being transferred to [facility] service for transplant evaluation.     * LVAD (left ventricular assist device) present  -HeartMate 3 Implanted  12/1/2023  as DT  -[facility]  -Implanted by Dr. Washington  -Continue Coumadin, dosing by PharmD.  Goal INR 2.0-3.0 . Therapeutic today.   -Antiplatelets Not on ASA  -LDH is stable overall today. Will continue to monitor daily.  -Weaned off midodrine.  -Speed set at  4900   rpm, LSL 4500 rpm   -Interrogation notable for no events  -ECHO 1/2/24 mild TR, LVEDD 6.25 with HM3 set to 4900 rpm   -Not listed for OHTx, declined for OHTX due to comorbidities   -PT/OT/Speech. Recommending rehab but HD+LVAD may be barrier.       Procedure: Device Interrogation Including analysis of device parameters  Current Settings: Ventricular Assist Device  Review of device function is stable/unstable stable        1/8/2024      7:30 AM 1/8/2024     4:00 AM 1/8/2024    12:00 AM 1/7/2024     7:39 PM 1/7/2024     4:00 PM 1/7/2024    12:00 PM 1/7/2024     8:00 AM   TXP LVAD INTERROGATIONS   Type HeartMate3   HeartMate3 HeartMate3 HeartMate3 HeartMate3   Flow 3.6 3.8 3.8 4 4 4 3.8   Speed 4900 4900 5900 4900 4900 4900 4900   PI 5.9 4.4 4.8 5.1 4 4.1 5   Power ([place]) 3.3 3.3 3.3 3.3 3.2 3.2 3.3   LSL 4500  4500 4500 4500 4500 4500   Low Flow Alarm no  no no no  no   High Power Alarm no  no no      Pulsatility     Pulse Pulse Pulse         Right parotid mass  - 2.2cm R. Parotid mass noted.   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 Can follow up as outpatient w/ ENT. No further interventions needed as inpatient.     Lymphadenopathy  +ill contacts  Respiratory panel negative  Strep test negative.   Throat culture showing pseudomonas. As per ID , will treat w/ 5 day course of zosyn.       Unilateral complete vocal fold paralysis  -Appreciate ENT's help  -S/P augmentation of paralyzed left vocal cord 12/18    Dysphonia  -Speech following closely  -Appreciate ENT's help. S/P augmentation of paralyzed left vocal cord 12/18    Moderate malnutrition  -RD and SLP following  -Tolerating tube feeds. Total daily caloric intake increased to 2160  -Failed MBBS 12/20, continue TF.  -Speech following. Plan to repeat swallow study today     Depressed mood  -Psychiatry consulted for depressed mood, SI when left alone  -Recommend sitter when alone (possibly with transition to stepdown).  -Psych reconsulted 1/2, agree with venlafaxine and trazadone    IVÁN (acute kidney injury)  -Nephrology following      Acute on chronic combined systolic and diastolic CHF, NYHA class 4  Pt with known ICM with an EF of 25% on home  presented with decompensated HF.    -S/P LVAD    PAF (paroxysmal atrial fibrillation)  Known hx of pAF. In sinus rhythm on admission, but 1 run of AF RVR overnight. He spontaneously converted.  - Continue home amiodarone 400mg qd  -  Continue AC      Acute renal failure with acute tubular necrosis superimposed on stage 3b chronic kidney disease  IVÁN on CKD2. Baseline Cr of 1.7-2.0.   - Hold ARNi  -Trend BMPs daily   -Nephrology following   -SLED/SCUF for volume removal began on 12/1. He is anuric  -Did not respond to diuretic challenge with 240 mg IV Lasix, 1000 mg Diuril, and 500 mg IV Diamox done 12/14  -Now on HD TThSat  -Tunneled trialysis line placed 12/22 by IR  -Neph recommending diuretic challenge with 160 mg IVP Lasix     Type 2 diabetes mellitus without complication, without long-term current use of insulin  -A1c 7.6, not insulin dependent  - Endocrine following, appreciate assistance with blood glucose management    CAD (coronary artery disease)  -S/p 3vCABG in 2009  -Lipitor on hold 2/2 mild transaminitis  -Not on ASA post VAD    Ventricular tachycardia  Hx VT s/p ICD placement (medtronic 2009)  - Continue home amio 400mg qd        ABBI AdamsC  Heart Transplant  [facility] - Cardiology Stepdown

## 2024-01-08 NOTE — PT/OT/SLP PROGRESS
"Occupational Therapy  Treatment    Name: Radha Abbott  MRN: [id number]  Admitting Diagnosis:  LVAD (left ventricular assist device) present  35 Days Post-Op    Recommendations:     Discharge Recommendations: High Intensity Therapy  Discharge Equipment Recommendations:  to be determined by next level of care  Barriers to discharge:  None    Assessment:     Radha Abbott is a 63 y.o. male with a medical diagnosis of LVAD (left ventricular assist device) present.  Pt presents with decreased endurance and impaired mobility performance as limited by cardiovascular status and generalized weakness. Pt found upright in bed and agreeable for therapy today. Pt session focused on donning of LVAD vest and t/f to bedside chair. Pt continues to require min A for management of fine motor components of  LVAD however has shown overall excellent progress. Pt continues to require significant A x2 persons to complete pivot to bedside with pt achieving 50% upright needing cues for erect posturing. Patient has demonstrated sufficient progression to warrant high intensity therapy evidenced by objectives noted below.  \ Performance deficits affecting function are weakness, gait instability, impaired endurance, impaired balance, impaired self care skills, impaired functional mobility, impaired cardiopulmonary response to activity, decreased upper extremity function, decreased lower extremity function.     Rehab Prognosis:  Good; patient would benefit from acute skilled OT services to address these deficits and reach maximum level of function.       Plan:     Patient to be seen 5 x/week to address the above listed problems via self-care/home management, therapeutic activities, therapeutic exercises, neuromuscular re-education  Plan of Care Expires: 02/03/24  Plan of Care Reviewed with: patient, family    Subjective     Chief Complaint: "You're late"-- pt frustrated upon arrival however educated on fluctuating schedule in acute care setting with " this therapist providing gentle encouragement to participate at current time of arrival.  Patient/Family Comments/goals: to get stronger   Pain/Comfort:  Pain Rating 1: 0/10  Pain Rating Post-Intervention 1: 0/10  Pain Rating Post-Intervention 2: 0/10    Objective:       Communicated with: RN prior to session.  Patient found HOB elevated with telemetry, LVAD, PICC line, NG tube upon OT entry to room.    General Precautions: Standard, fall, LVAD, sternal    Orthopedic Precautions:N/A  Braces: N/A  Respiratory Status: Room air     Occupational Performance:     Bed Mobility:  *maintaining sternal  prec  Patient completed Rolling/Turning to Left with  minimum assistance  Patient completed Scooting/Bridging with minimum assistance  Patient completed Supine to Sit with minimum assistance     Functional Mobility/Transfers:  Patient completed Sit <> Stand Transfer with minimum assistance and of 2 persons  with  hand-held assist   Patient completed Bed <> Chair Transfer using Stand Pivot technique with moderate assistance and of 2 persons with hand-held assist  Functional Mobility: Pt sat EOB ~12 minutes with SBA-CGA. Pt complete sit>stand fairly however quickly kyphotic achieving 50% upright despite cues needed for fully upright. Pt needed max cues for upright and mod cues for correcting step length. Pt unable to widen [place].    Activities of Daily Living:  Upper Body Dressing: moderate assistance donning holster vest at EOB      [facility] 6 Click ADL: 12    Treatment & Education:  Pt educated on role of occupational therapy, POC, and safety during ADLs and functional mobility. Pt and OT discussed importance of safe, continued mobility to optimize daily living skills. Pt verbalized understanding.   Pt able to recall 2/3 sternal prec. Pt required light A for FM parts of screwing/unscrewing power cables, of donning holster vest, and donning batteries in vest.  White board updated during session. Pt given instruction to call for  medical staff/nurse for assistance.       Patient left up in chair with all lines intact, call button in reach, RN notified, and spouse present    GOALS:   Multidisciplinary Problems       Occupational Therapy Goals          Problem: Occupational Therapy    Goal Priority Disciplines Outcome Interventions   Occupational Therapy Goal     OT, PT/OT Ongoing, Progressing    Description: Goals to be met by: 2/3/24    Patient will increase functional independence with ADLs by performing:    UB Dressing with SBA  LE Dressing with Supervision.  Grooming while standing at sink with Supervision.  Toileting from bedside commode with SBA for hygiene and clothing management.   Step transfer to bedside commode with SBA  [place] with VAD management during ADLs                             Time Tracking:     OT Date of Treatment: 01/08/24  OT Start Time: 1108  OT Stop Time: 1139  OT Total Time (min): 31 min    Billable Minutes:Self Care/Home Management 20 min  Therapeutic Activity 11 min    OT/PRIETO: OT     Number of PRIETO visits since last OT visit: 0    1/8/2024

## 2024-01-08 NOTE — PT/OT/SLP PROGRESS
Speech Language Pathology Treatment    Patient Name:  Radha Abbott   MRN:  [id number]  Admitting Diagnosis: LVAD (left ventricular assist device) present    Recommendations:                 General Recommendations:  Dysphagia therapy  Diet recommendations:  Minced & Moist Diet - IDDSI Level 5, Liquid Diet Level: Moderately thick liquids - IDDSI Level 3  All liquids MUST be a honey thick consistency - Pt is a silent aspirator.   Pre-thickened liquids level 3 with yellow triangle - MODERATELY THICK  Can also thicken using thickener packets, 1 full pack to 4 oz of fluid   Can continue with ice chips only when completing dysphagia exercises  Encourage double swallows between bites and sips to clear residue     Aspiration Precautions: Strict aspiration precautions   General Precautions: Standard, fall  Communication strategies:  none    Assessment:     Radha Abbott is a 63 y.o. male with an SLP diagnosis of Dysphagia.  Subjective     Pt awake and alert upon arrival   Spouse at the bedside     Pain/Comfort:   No pain pre-post     Respiratory Status: Room air    Objective:     Has the patient been evaluated by SLP for swallowing?   Yes  Keep patient NPO? No   Current Respiratory Status:   Room air      Pt seen for ongoing dysphagia tx. Pt remains with NG tube in place. Pt vocal quality continues to significantly improve and noted to be strong and clear this date.  SLP reviewed at length all precautions including importance of executing second swallows throughout meals to assist with clearing residue.  SLP reviewed with pt and spouse present how to thicken liquids to safest consistency as well as which pre-thickened liquids meet honey thick recommendations. Pt was observed this date to consume ~ 3oz of honey thickened liquids. . Pt warranting SLP mod-max verbal cues to execute a second dry swallow prior to taking additional bites/sips. SLP demonstrated importance of observing pre-thickened liquids packaging to ensure proper  level 3- yellow triangle moderately thickened liquids being offered.  SLP to continue to closely follow. Diet recommendations accurate on whiteboard in room  Speech to continue to closely follow.        Goals:   Multidisciplinary Problems       SLP Goals          Problem: SLP    Goal Priority Disciplines Outcome   SLP Goal     SLP Ongoing, Progressing   Description: Speech Language Pathology Goals  Goals expected to be met by 12/24    1. Pt will participate in ongoing assessment of swallow function to help determine least restrictive diet                            Plan:     Patient to be seen:  4 x/week   Plan of Care reviewed with:  patient   SLP Follow-Up:  Yes       Discharge recommendations:  High Intensity Therapy   Barriers to Discharge:  None    Time Tracking:     SLP Treatment Date:   01/08/24  Speech Start Time:  1032  Speech Stop Time:  1056     Speech Total Time (min):  24 min    Billable Minutes: Treatment Swallowing Dysfunction 12 and Self Care/Home Management Training 12    01/08/2024

## 2024-01-08 NOTE — PLAN OF CARE
Recommendations   1) Continue current minced and moist diet as tolerated per SLP recs with honey-thick liquids - encourage PO intake  2) If enteral nutrition continues, continue current TF regimen as tolerated, Novasource Renal @ 45 mL/hr 2160 kcal, 98g PRO, and 774ml FF- FWF per MD   - monitor for s/s of intolerance such as residuals >500ml, n/v/d, or abdominal distension  - will monitor need to decrease TF rate if PO intake improves     3) Continue BenePRO BID if CRRT continues (PRO needs will be higher), provides 12g PRO  4) Continue fiber for diarrhea as needed  5) RD to monitor wt, PO intake/tolerance, TF rate/tolerance, skin, labs     Goals: Meet % of EEN/EPN by RD f/u date  Nutrition Goal Status: progressing towards goal  Communication of RD Recs:  (POC)

## 2024-01-08 NOTE — ASSESSMENT & PLAN NOTE
-HeartMate 3 Implanted  12/1/2023  as DT  -[facility] Primary  -Implanted by Dr. Washington  -Continue Coumadin, dosing by PharmD.  Goal INR 2.0-3.0 . Therapeutic today.   -Antiplatelets Not on ASA  -LDH is stable overall today. Will continue to monitor daily.  -Weaned off midodrine.  -Speed set at  4900   rpm, LSL 4500 rpm   -Interrogation notable for no events  -ECHO 1/2/24 mild TR, LVEDD 6.25 with HM3 set to 4900 rpm   -Not listed for OHTx, declined for OHTX due to comorbidities   -PT/OT/Speech. Recommending rehab but HD+LVAD may be barrier.       Procedure: Device Interrogation Including analysis of device parameters  Current Settings: Ventricular Assist Device  Review of device function is stable/unstable stable        1/8/2024     7:30 AM 1/8/2024     4:00 AM 1/8/2024    12:00 AM 1/7/2024     7:39 PM 1/7/2024     4:00 PM 1/7/2024    12:00 PM 1/7/2024     8:00 AM   TXP LVAD INTERROGATIONS   Type HeartMate3   HeartMate3 HeartMate3 HeartMate3 HeartMate3   Flow 3.6 3.8 3.8 4 4 4 3.8   Speed 4900 4900 5900 4900 4900 4900 4900   PI 5.9 4.4 4.8 5.1 4 4.1 5   Power ([place]) 3.3 3.3 3.3 3.3 3.2 3.2 3.3   LSL 4500  4500 4500 4500 4500 4500   Low Flow Alarm no  no no no  no   High Power Alarm no  no no      Pulsatility     Pulse Pulse Pulse

## 2024-01-08 NOTE — ASSESSMENT & PLAN NOTE
Lab Results   Component Value Date    CREATININE 5.5 (H) 01/08/2024     Avoid insulin stacking  Titrate insulin slowly

## 2024-01-09 LAB
ABO + RH BLD: NORMAL
ALBUMIN SERPL BCP-MCNC: 1.9 G/DL (ref 3.5–5.2)
ALP SERPL-CCNC: 156 U/L (ref 55–135)
ALT SERPL W/O P-5'-P-CCNC: 26 U/L (ref 10–44)
ANION GAP SERPL CALC-SCNC: 12 MMOL/L (ref 8–16)
AST SERPL-CCNC: 32 U/L (ref 10–40)
BASOPHILS # BLD AUTO: 0.05 K/UL (ref 0–0.2)
BASOPHILS NFR BLD: 0.6 % (ref 0–1.9)
BILIRUB DIRECT SERPL-MCNC: 0.1 MG/DL (ref 0.1–0.3)
BILIRUB SERPL-MCNC: 0.3 MG/DL (ref 0.1–1)
BLD GP AB SCN CELLS X3 SERPL QL: NORMAL
BUN SERPL-MCNC: 103 MG/DL (ref 8–23)
CA-I BLDV-SCNC: 1.17 MMOL/L (ref 1.06–1.42)
CALCIUM SERPL-MCNC: 8.6 MG/DL (ref 8.7–10.5)
CHLORIDE SERPL-SCNC: 99 MMOL/L (ref 95–110)
CO2 SERPL-SCNC: 21 MMOL/L (ref 23–29)
CREAT SERPL-MCNC: 5.8 MG/DL (ref 0.5–1.4)
DIFFERENTIAL METHOD BLD: ABNORMAL
EOSINOPHIL # BLD AUTO: 0.4 K/UL (ref 0–0.5)
EOSINOPHIL NFR BLD: 4.7 % (ref 0–8)
ERYTHROCYTE [DISTWIDTH] IN BLOOD BY AUTOMATED COUNT: 16.9 % (ref 11.5–14.5)
EST. GFR  (NO RACE VARIABLE): 10.4 ML/MIN/1.73 M^2
FUNGUS SPEC CULT: NORMAL
GLUCOSE SERPL-MCNC: 146 MG/DL (ref 70–110)
HCT VFR BLD AUTO: 21.2 % (ref 40–54)
HGB BLD-MCNC: 6.5 G/DL (ref 14–18)
IMM GRANULOCYTES # BLD AUTO: 0.07 K/UL (ref 0–0.04)
IMM GRANULOCYTES NFR BLD AUTO: 0.8 % (ref 0–0.5)
INR PPP: 3.6 (ref 0.8–1.2)
LDH SERPL L TO P-CCNC: 337 U/L (ref 110–260)
LYMPHOCYTES # BLD AUTO: 0.8 K/UL (ref 1–4.8)
LYMPHOCYTES NFR BLD: 9.9 % (ref 18–48)
MAGNESIUM SERPL-MCNC: 2.2 MG/DL (ref 1.6–2.6)
MCH RBC QN AUTO: 28.1 PG (ref 27–31)
MCHC RBC AUTO-ENTMCNC: 30.7 G/DL (ref 32–36)
MCV RBC AUTO: 92 FL (ref 82–98)
MONOCYTES # BLD AUTO: 0.6 K/UL (ref 0.3–1)
MONOCYTES NFR BLD: 6.9 % (ref 4–15)
NEUTROPHILS # BLD AUTO: 6.4 K/UL (ref 1.8–7.7)
NEUTROPHILS NFR BLD: 77.1 % (ref 38–73)
NRBC BLD-RTO: 0 /100 WBC
PHOSPHATE SERPL-MCNC: 3.2 MG/DL (ref 2.7–4.5)
PLATELET # BLD AUTO: 439 K/UL (ref 150–450)
PMV BLD AUTO: 9.8 FL (ref 9.2–12.9)
POCT GLUCOSE: 112 MG/DL (ref 70–110)
POCT GLUCOSE: 131 MG/DL (ref 70–110)
POCT GLUCOSE: 160 MG/DL (ref 70–110)
POCT GLUCOSE: 175 MG/DL (ref 70–110)
POCT GLUCOSE: 194 MG/DL (ref 70–110)
POCT GLUCOSE: 203 MG/DL (ref 70–110)
POTASSIUM SERPL-SCNC: 3.8 MMOL/L (ref 3.5–5.1)
PROT SERPL-MCNC: 6.9 G/DL (ref 6–8.4)
PROTHROMBIN TIME: 35.5 SEC (ref 9–12.5)
RBC # BLD AUTO: 2.31 M/UL (ref 4.6–6.2)
SODIUM SERPL-SCNC: 132 MMOL/L (ref 136–145)
SPECIMEN OUTDATE: NORMAL
WBC # BLD AUTO: 8.28 K/UL (ref 3.9–12.7)

## 2024-01-09 PROCEDURE — 25000003 PHARM REV CODE 250: Performed by: NURSE PRACTITIONER

## 2024-01-09 PROCEDURE — 80048 BASIC METABOLIC PNL TOTAL CA: CPT | Performed by: INTERNAL MEDICINE

## 2024-01-09 PROCEDURE — 97110 THERAPEUTIC EXERCISES: CPT

## 2024-01-09 PROCEDURE — 84100 ASSAY OF PHOSPHORUS: CPT | Performed by: INTERNAL MEDICINE

## 2024-01-09 PROCEDURE — 80076 HEPATIC FUNCTION PANEL: CPT | Performed by: INTERNAL MEDICINE

## 2024-01-09 PROCEDURE — 25000003 PHARM REV CODE 250: Performed by: INTERNAL MEDICINE

## 2024-01-09 PROCEDURE — 86920 COMPATIBILITY TEST SPIN: CPT | Performed by: PHYSICIAN ASSISTANT

## 2024-01-09 PROCEDURE — 25000003 PHARM REV CODE 250: Performed by: STUDENT IN AN ORGANIZED HEALTH CARE EDUCATION/TRAINING PROGRAM

## 2024-01-09 PROCEDURE — 82330 ASSAY OF CALCIUM: CPT | Performed by: INTERNAL MEDICINE

## 2024-01-09 PROCEDURE — 99233 SBSQ HOSP IP/OBS HIGH 50: CPT | Mod: ,,, | Performed by: INTERNAL MEDICINE

## 2024-01-09 PROCEDURE — 27000207 HC ISOLATION

## 2024-01-09 PROCEDURE — 85025 COMPLETE CBC W/AUTO DIFF WBC: CPT | Performed by: PHYSICIAN ASSISTANT

## 2024-01-09 PROCEDURE — 83615 LACTATE (LD) (LDH) ENZYME: CPT | Performed by: STUDENT IN AN ORGANIZED HEALTH CARE EDUCATION/TRAINING PROGRAM

## 2024-01-09 PROCEDURE — 36415 COLL VENOUS BLD VENIPUNCTURE: CPT | Performed by: PHYSICIAN ASSISTANT

## 2024-01-09 PROCEDURE — 63600175 PHARM REV CODE 636 W HCPCS: Performed by: INTERNAL MEDICINE

## 2024-01-09 PROCEDURE — 92526 ORAL FUNCTION THERAPY: CPT

## 2024-01-09 PROCEDURE — 93750 INTERROGATION VAD IN PERSON: CPT | Mod: ,,, | Performed by: INTERNAL MEDICINE

## 2024-01-09 PROCEDURE — 27000248 HC VAD-ADDITIONAL DAY

## 2024-01-09 PROCEDURE — 97530 THERAPEUTIC ACTIVITIES: CPT

## 2024-01-09 PROCEDURE — 85610 PROTHROMBIN TIME: CPT | Performed by: PHYSICIAN ASSISTANT

## 2024-01-09 PROCEDURE — 86901 BLOOD TYPING SEROLOGIC RH(D): CPT | Performed by: PHYSICIAN ASSISTANT

## 2024-01-09 PROCEDURE — 97116 GAIT TRAINING THERAPY: CPT

## 2024-01-09 PROCEDURE — 99232 SBSQ HOSP IP/OBS MODERATE 35: CPT | Mod: ,,, | Performed by: NURSE PRACTITIONER

## 2024-01-09 PROCEDURE — 99233 SBSQ HOSP IP/OBS HIGH 50: CPT | Mod: ,,, | Performed by: PHYSICIAN ASSISTANT

## 2024-01-09 PROCEDURE — 97535 SELF CARE MNGMENT TRAINING: CPT

## 2024-01-09 PROCEDURE — 20600001 HC STEP DOWN PRIVATE ROOM

## 2024-01-09 PROCEDURE — 83735 ASSAY OF MAGNESIUM: CPT | Performed by: INTERNAL MEDICINE

## 2024-01-09 PROCEDURE — 25000003 PHARM REV CODE 250

## 2024-01-09 RX ORDER — HYDROCODONE BITARTRATE AND ACETAMINOPHEN 500; 5 MG/1; MG/1
TABLET ORAL
Status: DISCONTINUED | OUTPATIENT
Start: 2024-01-09 | End: 2024-01-23

## 2024-01-09 RX ORDER — SODIUM CHLORIDE 9 MG/ML
INJECTION, SOLUTION INTRAVENOUS ONCE
Status: COMPLETED | OUTPATIENT
Start: 2024-01-09 | End: 2024-01-10

## 2024-01-09 RX ADMIN — Medication 2000 UNITS: at 09:01

## 2024-01-09 RX ADMIN — INSULIN ASPART 6 UNITS: 100 INJECTION, SOLUTION INTRAVENOUS; SUBCUTANEOUS at 12:01

## 2024-01-09 RX ADMIN — FAMOTIDINE 20 MG: 20 TABLET, FILM COATED ORAL at 09:01

## 2024-01-09 RX ADMIN — INSULIN ASPART 4 UNITS: 100 INJECTION, SOLUTION INTRAVENOUS; SUBCUTANEOUS at 04:01

## 2024-01-09 RX ADMIN — OSELTAMIVIR PHOSPHATE 30 MG: 30 CAPSULE ORAL at 08:01

## 2024-01-09 RX ADMIN — INSULIN ASPART 6 UNITS: 100 INJECTION, SOLUTION INTRAVENOUS; SUBCUTANEOUS at 05:01

## 2024-01-09 RX ADMIN — PIPERACILLIN SODIUM AND TAZOBACTAM SODIUM 4.5 G: 4; .5 INJECTION, POWDER, FOR SOLUTION INTRAVENOUS at 01:01

## 2024-01-09 RX ADMIN — VENLAFAXINE 37.5 MG: 37.5 TABLET ORAL at 09:01

## 2024-01-09 RX ADMIN — Medication: at 09:01

## 2024-01-09 RX ADMIN — ACETAMINOPHEN 1000 MG: 500 TABLET ORAL at 03:01

## 2024-01-09 RX ADMIN — INSULIN ASPART 4 UNITS: 100 INJECTION, SOLUTION INTRAVENOUS; SUBCUTANEOUS at 08:01

## 2024-01-09 RX ADMIN — ACETAMINOPHEN 1000 MG: 500 TABLET ORAL at 09:01

## 2024-01-09 RX ADMIN — GABAPENTIN 125 MG: 250 SOLUTION ORAL at 09:01

## 2024-01-09 RX ADMIN — INSULIN ASPART 2 UNITS: 100 INJECTION, SOLUTION INTRAVENOUS; SUBCUTANEOUS at 09:01

## 2024-01-09 RX ADMIN — GABAPENTIN 125 MG: 250 SOLUTION ORAL at 08:01

## 2024-01-09 RX ADMIN — INSULIN ASPART 4 UNITS: 100 INJECTION, SOLUTION INTRAVENOUS; SUBCUTANEOUS at 12:01

## 2024-01-09 RX ADMIN — AMIODARONE HYDROCHLORIDE 400 MG: 200 TABLET ORAL at 09:01

## 2024-01-09 RX ADMIN — INSULIN ASPART 6 UNITS: 100 INJECTION, SOLUTION INTRAVENOUS; SUBCUTANEOUS at 09:01

## 2024-01-09 RX ADMIN — TRAZODONE HYDROCHLORIDE 50 MG: 50 TABLET ORAL at 08:01

## 2024-01-09 RX ADMIN — SENNOSIDES AND DOCUSATE SODIUM 2 TABLET: 8.6; 5 TABLET ORAL at 09:01

## 2024-01-09 NOTE — PROGRESS NOTES
[facility] - Cardiology Stepdown  Endocrinology  Progress Note    Admit Date: 11/9/2023     Reason for Consult: Management of T2DM, Hyperglycemia     Surgical Procedure and Date: n/a    Diabetes diagnosis year: 1998    Home Diabetes Medications:  Metformin (off since October)   Lab Results   Component Value Date    HGBA1C 7.6 (H) 10/12/2023       How often checking glucose at home?  Once daily in the AM    BG readings on regimen: 150-160s  Hypoglycemia on the regimen?  No  Missed doses on regimen?  n/a    Diabetes Complications include:     Hyperglycemia    Complicating diabetes co morbidities:   CAD s/p CABG, HTN, HLD      HPI:   Patient is a 63 y.o. male with a diagnosis of CAD s/p 3v CABG (unclear anatomy, 2009), ICM with a recent EF of 15-20% s/p ICD (medtronik 2009), DM2 (a1c 7.7), HTN, HLD, Vfib on amio who presents to the ED with CC of SOB. In the ED he was AF with stable VS on RA.  CBC showing chronic anemia.  CMP notable for acute on chronic CKD with baseline around 2.1 and Cr now 2.6.  BNP elevated.  Lactate neg.  CXR showing pulmonary edema. He was subsequently admitted to the CCU for diuresis.  Endocrinology consulted for management of T2DM.          Interval HPI:   Overnight events: No acute events overnight. Patient in room 312/312 A. Blood glucose stable. BG at goal on current insulin regimen (Schedule Insulin and SSI (TPN/TF)). Steroid use- None. 36 Days Post-Op  Renal function- Abnormal - Creatinine 5.8   Vasopressors-  None       Endocrine will continue to follow and manage insulin orders inpatient.         Diet Minced & Moist (IDDSI Level 5) Honey Thick (Moderately Thick)     Eating:   <25%  Nausea: No  Hypoglycemia and intervention: No  Fever: No  TPN and/or TF: Yes  If yes, type of TF/TPN and rate: TF @ 45 ml/hr    BP (!) 80/0 (BP Location: Left arm, Patient Position: Lying)   Pulse 93   Temp 97.7 °F (36.5 °C) (Oral)   Resp 18   Ht 6' (1.829 m)   Wt 68.9 kg (152 lb)   SpO2 100%   BMI 20.61  "kg/m²     Labs Reviewed and Include    Recent Labs   Lab 01/09/24  0454   *   CALCIUM 8.6*   ALBUMIN 1.9*   PROT 6.9   *   K 3.8   CO2 21*   CL 99   *   CREATININE 5.8*   ALKPHOS 156*   ALT 26   AST 32   BILITOT 0.3     Lab Results   Component Value Date    WBC 8.28 01/09/2024    HGB 6.5 (L) 01/09/2024    HCT 21.2 (L) 01/09/2024    MCV 92 01/09/2024     01/09/2024     No results for input(s): "TSH", "FREET4" in the last 168 hours.  Lab Results   Component Value Date    HGBA1C 7.6 (H) 10/12/2023       Nutritional status:   Body mass index is 20.61 kg/m².  Lab Results   Component Value Date    ALBUMIN 1.9 (L) 01/09/2024    ALBUMIN 2.0 (L) 01/08/2024    ALBUMIN 2.0 (L) 01/07/2024     Lab Results   Component Value Date    PREALBUMIN 26 01/08/2024    PREALBUMIN 35 01/05/2024    PREALBUMIN 30 01/03/2024       Estimated Creatinine Clearance: 13 mL/min (A) (based on SCr of 5.8 mg/dL (H)).    Accu-Checks  Recent Labs     01/08/24  0045 01/08/24  0438 01/08/24  0439 01/08/24  0740 01/08/24  1216 01/08/24  1636 01/08/24  2125 01/09/24  0006 01/09/24  0452 01/09/24  0802   POCTGLUCOSE 216* >500* 176* 123* 189* 144* 136* 112* 160* 203*       Current Medications and/or Treatments Impacting Glycemic Control  Immunotherapy:    Immunosuppressants       None          Steroids:   Hormones (From admission, onward)      None          Pressors:    Autonomic Drugs (From admission, onward)      None          Hyperglycemia/Diabetes Medications:   Antihyperglycemics (From admission, onward)      Start     Stop Route Frequency Ordered    01/05/24 0800  insulin aspart U-100 pen 6 Units         -- SubQ Every 24 hours (non-standard times) 01/04/24 1214    01/05/24 0400  insulin aspart U-100 pen 4 Units         -- SubQ Every 24 hours (non-standard times) 01/04/24 1214    01/05/24 0000  insulin aspart U-100 pen 4 Units         -- SubQ Every 24 hours (non-standard times) 01/04/24 1214 01/04/24 2000  insulin aspart " U-100 pen 4 Units         -- SubQ Every 24 hours (non-standard times) 01/04/24 1214    01/04/24 1600  insulin aspart U-100 pen 6 Units         -- SubQ Every 24 hours (non-standard times) 01/04/24 1214    01/04/24 1213  insulin aspart U-100 pen 6 Units         -- SubQ Every 24 hours (non-standard times) 01/04/24 1214    12/31/23 1108  insulin aspart U-100 pen 0-5 Units         -- SubQ Every 4 hours PRN 12/31/23 1008            ASSESSMENT and PLAN    Cardiac/Vascular  * LVAD (left ventricular assist device) present  Optimize BG control to improve wound healing        PAF (paroxysmal atrial fibrillation)  May increase insulin resistance.         Acute on chronic combined systolic and diastolic CHF, NYHA class 4  Managed per primary team  Optimize BG control  S/p LVAD     Renal/  Acute renal failure with acute tubular necrosis superimposed on stage 3b chronic kidney disease  Lab Results   Component Value Date    CREATININE 5.8 (H) 01/09/2024     Avoid insulin stacking  Titrate insulin slowly       Endocrine  Type 2 diabetes mellitus without complication, without long-term current use of insulin  BG goal 140-180. TF @ 45 ml/hr.     - Novolog 6 units during 0800, 1200, and 1600 BG checks to cover additional intake. -hold if TF held or BG < 100   - Novolog 4 units 2000, 0000, 0400 to cover TF- hold if TF held or BG < 100   - LDC (150/50) prn q4hr   - POCT Glucose every 4 hours  - Hypoglycemia protocol in place      ** Please notify Endocrine for any change and/or advance in diet**  ** Please call Endocrine for any BG related issues **     Discharge Planning:   TBD. Please notify endocrinology prior to discharge.                  Erasmo Parrish, DNP, FNP  Endocrinology  [facility] - Cardiology Stepdown

## 2024-01-09 NOTE — PT/OT/SLP PROGRESS
Occupational Therapy   Treatment    Name: Radha Abbott  MRN: [id number]  Admitting Diagnosis:  LVAD (left ventricular assist device) present  36 Days Post-Op    Recommendations:     Discharge Recommendations: High Intensity Therapy  Discharge Equipment Recommendations:  other (see comments), wheelchair, hospital bed (bilateral platform walker)  Barriers to discharge:  None    Assessment:     Radha Abbott is a 63 y.o. male with a medical diagnosis of LVAD (left ventricular assist device) present. Pt presents with decreased endurance and impaired mobility performance as limited by cardiovascular status and generalized weakness. Pt found upright in bed and agreeable for therapy. Session focused on fx'l standing trial today using a platform walker. Pt continues to require increased A to ascend from low level surfaces needing x2 persons. Pt also with decreased endurance requiring increased breaks between trials. At this time, pt is a high fall risk and not safe to return home. Patient has demonstrated sufficient progression to warrant high intensity therapy evidenced by objectives noted below.   Performance deficits affecting function are weakness, gait instability, impaired endurance, impaired balance, impaired self care skills, impaired functional mobility, decreased lower extremity function, decreased upper extremity function, decreased safety awareness.     Rehab Prognosis:  Good; patient would benefit from acute skilled OT services to address these deficits and reach maximum level of function.       Plan:     Patient to be seen 5 x/week to address the above listed problems via self-care/home management, therapeutic activities, therapeutic exercises, neuromuscular re-education  Plan of Care Expires: 02/03/24  Plan of Care Reviewed with: patient, spouse    Subjective     Chief Complaint: fatigue after mobility  Patient/Family Comments/goals: improve stamina   Pain/Comfort:  Pain Rating 1: other (see comments) (R foot  pain)  Location - Side 1: Right  Location - Orientation 1: generalized  Location 1: foot  Pain Addressed 1: Reposition, Distraction, Nurse notified  Pain Rating Post-Intervention 2: 0/10    Objective:     Communicated with: RN prior to session.  Patient found up in chair with telemetry, peripheral IV, LVAD, NG tube upon OT entry to room.    General Precautions: Standard, fall, sternal, LVAD    Orthopedic Precautions:N/A  Braces: N/A  Respiratory Status: Room air     Occupational Performance:   Functional Mobility/Transfers:  Patient completed Sit <> Stand Transfer with moderate assistance, maximal assistance, and of 2 persons  with  hand-held assist   Functional Mobility: Pt completed x5 sit<stand t/f trials. Pt ascended first trial with max A x2-HHA and then transitioned to standing using platform RW for balance. Pt stood x4 additional trials using platform walker with mod A x2 in similar fashion. Pt held each trial ~10 sec each with last trial pt tolerated 15 seconds each.     Activities of Daily Living:  Grooming: politely declined .      [facility] 6 Click ADL: 12    Treatment & Education:  Pt educated on role of occupational therapy, POC, and safety during ADLs and functional mobility. Pt and OT discussed importance of safe, continued mobility to optimize daily living skills. Pt verbalized understanding.     White board updated during session. Pt given instruction to call for medical staff/nurse for assistance.         Patient left up in chair with all lines intact, call button in reach, RN notified, and spouse present    GOALS:   Multidisciplinary Problems       Occupational Therapy Goals          Problem: Occupational Therapy    Goal Priority Disciplines Outcome Interventions   Occupational Therapy Goal     OT, PT/OT Ongoing, Progressing    Description: Goals to be met by: 2/3/24    Patient will increase functional independence with ADLs by performing:    UB Dressing with SBA  LE Dressing with  Supervision.  Grooming while standing at sink with Supervision.  Toileting from bedside commode with SBA for hygiene and clothing management.   Step transfer to bedside commode with SBA  Velva with VAD management during ADLs                             Time Tracking:     OT Date of Treatment: 01/09/24  OT Start Time: 1028  OT Stop Time: 1051  OT Total Time (min): 23 min    Billable Minutes:Therapeutic Activity 10 min  Therapeutic Exercise 13 min    OT/PRIETO: OT     Number of PRIETO visits since last OT visit: 0    1/9/2024

## 2024-01-09 NOTE — PLAN OF CARE
AAOX4,VSS,O2 sats>92% on room air. Plan of care discussed with patient and spouse. Patient has no complaints of SOB. Pain being managed with q2 turning and scheduled pain medication. LVAD numbers WNL, no alarms. Discussed medications and care. Patient has no questions at this time.Pt visualised and stable.Call light within reach.Pt resting,bed at lowest position.    Problem: Adult Inpatient Plan of Care  Goal: Plan of Care Review  Outcome: Ongoing, Progressing  Goal: Patient-Specific Goal (Individualized)  Outcome: Ongoing, Progressing  Goal: Absence of Hospital-Acquired Illness or Injury  Outcome: Ongoing, Progressing  Goal: Optimal Comfort and Wellbeing  Outcome: Ongoing, Progressing  Goal: Readiness for Transition of Care  Outcome: Ongoing, Progressing     Problem: Diabetes Comorbidity  Goal: Blood Glucose Level Within Targeted Range  Outcome: Ongoing, Progressing     Problem: Fluid and Electrolyte Imbalance (Acute Kidney Injury/Impairment)  Goal: Fluid and Electrolyte Balance  Outcome: Ongoing, Progressing     Problem: Oral Intake Inadequate (Acute Kidney Injury/Impairment)  Goal: Optimal Nutrition Intake  Outcome: Ongoing, Progressing     Problem: Renal Function Impairment (Acute Kidney Injury/Impairment)  Goal: Effective Renal Function  Outcome: Ongoing, Progressing     Problem: Infection  Goal: Absence of Infection Signs and Symptoms  Outcome: Ongoing, Progressing     Problem: Adjustment to Illness (Heart Failure)  Goal: Optimal Coping  Outcome: Ongoing, Progressing     Problem: Cardiac Output Decreased (Heart Failure)  Goal: Optimal Cardiac Output  Outcome: Ongoing, Progressing     Problem: Dysrhythmia (Heart Failure)  Goal: Stable Heart Rate and Rhythm  Outcome: Ongoing, Progressing     Problem: Fluid Imbalance (Heart Failure)  Goal: Fluid Balance  Outcome: Ongoing, Progressing     Problem: Functional Ability Impaired (Heart Failure)  Goal: Optimal Functional Ability  Outcome: Ongoing, Progressing      Problem: Oral Intake Inadequate (Heart Failure)  Goal: Optimal Nutrition Intake  Outcome: Ongoing, Progressing     Problem: Respiratory Compromise (Heart Failure)  Goal: Effective Oxygenation and Ventilation  Outcome: Ongoing, Progressing     Problem: Sleep Disordered Breathing (Heart Failure)  Goal: Effective Breathing Pattern During Sleep  Outcome: Ongoing, Progressing     Problem: Cardiac Output Decreased  Goal: Effective Cardiac Output  Outcome: Ongoing, Progressing     Problem: Fall Injury Risk  Goal: Absence of Fall and Fall-Related Injury  Outcome: Ongoing, Progressing     Problem: Coping Ineffective  Goal: Effective Coping  Outcome: Ongoing, Progressing     Problem: Skin Injury Risk Increased  Goal: Skin Health and Integrity  Outcome: Ongoing, Progressing     Problem: Adjustment to Device (Ventricular Assist Device)  Goal: Optimal Adjustment to Device  Outcome: Ongoing, Progressing     Problem: Bleeding (Ventricular Assist Device)  Goal: Absence of Bleeding  Outcome: Ongoing, Progressing     Problem: Embolism (Ventricular Assist Device)  Goal: Absence of Embolism Signs and Symptoms  Outcome: Ongoing, Progressing     Problem: Infection (Ventricular Assist Device)  Goal: Absence of Infection Signs and Symptoms  Outcome: Ongoing, Progressing     Problem: Right-Sided Heart Compromise (Ventricular Assist Device)  Goal: Effective Right-Sided Heart Function  Outcome: Ongoing, Progressing     Problem: Adjustment to Illness (Sepsis/Septic Shock)  Goal: Optimal Coping  Outcome: Ongoing, Progressing     Problem: Bleeding (Sepsis/Septic Shock)  Goal: Absence of Bleeding  Outcome: Ongoing, Progressing     Problem: Glycemic Control Impaired (Sepsis/Septic Shock)  Goal: Blood Glucose Level Within Desired Range  Outcome: Ongoing, Progressing     Problem: Infection Progression (Sepsis/Septic Shock)  Goal: Absence of Infection Signs and Symptoms  Outcome: Ongoing, Progressing     Problem: Nutrition Impaired (Sepsis/Septic  Shock)  Goal: Optimal Nutrition Intake  Outcome: Ongoing, Progressing     Problem: Impaired Wound Healing  Goal: Optimal Wound Healing  Outcome: Ongoing, Progressing     Problem: Device-Related Complication Risk (Hemodialysis)  Goal: Safe, Effective Therapy Delivery  Outcome: Ongoing, Progressing     Problem: Hemodynamic Instability (Hemodialysis)  Goal: Effective Tissue Perfusion  Outcome: Ongoing, Progressing     Problem: Infection (Hemodialysis)  Goal: Absence of Infection Signs and Symptoms  Outcome: Ongoing, Progressing

## 2024-01-09 NOTE — SUBJECTIVE & OBJECTIVE
"Interval HPI:   Overnight events: No acute events overnight. Patient in room 312/312 A. Blood glucose stable. BG at goal on current insulin regimen (Schedule Insulin and SSI (TPN/TF)). Steroid use- None. 36 Days Post-Op  Renal function- Abnormal - Creatinine 5.8   Vasopressors-  None       Endocrine will continue to follow and manage insulin orders inpatient.         Diet Minced & Moist (IDDSI Level 5) Honey Thick (Moderately Thick)     Eating:   <25%  Nausea: No  Hypoglycemia and intervention: No  Fever: No  TPN and/or TF: Yes  If yes, type of TF/TPN and rate: TF @ 45 ml/hr    BP (!) 80/0 (BP Location: Left arm, Patient Position: Lying)   Pulse 93   Temp 97.7 °F (36.5 °C) (Oral)   Resp 18   Ht 6' (1.829 m)   Wt 68.9 kg (152 lb)   SpO2 100%   BMI 20.61 kg/m²     Labs Reviewed and Include    Recent Labs   Lab 01/09/24  0454   *   CALCIUM 8.6*   ALBUMIN 1.9*   PROT 6.9   *   K 3.8   CO2 21*   CL 99   *   CREATININE 5.8*   ALKPHOS 156*   ALT 26   AST 32   BILITOT 0.3     Lab Results   Component Value Date    WBC 8.28 01/09/2024    HGB 6.5 (L) 01/09/2024    HCT 21.2 (L) 01/09/2024    MCV 92 01/09/2024     01/09/2024     No results for input(s): "TSH", "FREET4" in the last 168 hours.  Lab Results   Component Value Date    HGBA1C 7.6 (H) 10/12/2023       Nutritional status:   Body mass index is 20.61 kg/m².  Lab Results   Component Value Date    ALBUMIN 1.9 (L) 01/09/2024    ALBUMIN 2.0 (L) 01/08/2024    ALBUMIN 2.0 (L) 01/07/2024     Lab Results   Component Value Date    PREALBUMIN 26 01/08/2024    PREALBUMIN 35 01/05/2024    PREALBUMIN 30 01/03/2024       Estimated Creatinine Clearance: 13 mL/min (A) (based on SCr of 5.8 mg/dL (H)).    Accu-Checks  Recent Labs     01/08/24  0045 01/08/24  0438 01/08/24  0439 01/08/24  0740 01/08/24  1216 01/08/24  1636 01/08/24  2125 01/09/24  0006 01/09/24  0452 01/09/24  0802   POCTGLUCOSE 216* >500* 176* 123* 189* 144* 136* 112* 160* 203* "       Current Medications and/or Treatments Impacting Glycemic Control  Immunotherapy:    Immunosuppressants       None          Steroids:   Hormones (From admission, onward)      None          Pressors:    Autonomic Drugs (From admission, onward)      None          Hyperglycemia/Diabetes Medications:   Antihyperglycemics (From admission, onward)      Start     Stop Route Frequency Ordered    01/05/24 0800  insulin aspart U-100 pen 6 Units         -- SubQ Every 24 hours (non-standard times) 01/04/24 1214    01/05/24 0400  insulin aspart U-100 pen 4 Units         -- SubQ Every 24 hours (non-standard times) 01/04/24 1214    01/05/24 0000  insulin aspart U-100 pen 4 Units         -- SubQ Every 24 hours (non-standard times) 01/04/24 1214    01/04/24 2000  insulin aspart U-100 pen 4 Units         -- SubQ Every 24 hours (non-standard times) 01/04/24 1214    01/04/24 1600  insulin aspart U-100 pen 6 Units         -- SubQ Every 24 hours (non-standard times) 01/04/24 1214    01/04/24 1213  insulin aspart U-100 pen 6 Units         -- SubQ Every 24 hours (non-standard times) 01/04/24 1214    12/31/23 1108  insulin aspart U-100 pen 0-5 Units         -- SubQ Every 4 hours PRN 12/31/23 1008

## 2024-01-09 NOTE — SUBJECTIVE & OBJECTIVE
Interval History: Scr 5.8 from 5.5 yesterday. Lasix challenge yesterday 160 IV. 24  ml. HD today     Review of patient's allergies indicates:  No Known Allergies  Current Facility-Administered Medications   Medication Frequency    0.9%  NaCl infusion Continuous    acetaminophen tablet 1,000 mg TID    alteplase injection 2 mg Once    amiodarone tablet 400 mg Daily    balsam peru-castor oiL Oint BID    bisacodyL suppository 10 mg Daily PRN    cholecalciferol (vitamin D3) 400 units/mL oral liquid Daily    dextrose 10 % infusion Continuous PRN    dextrose 10% bolus 125 mL 125 mL PRN    dextrose 10% bolus 125 mL 125 mL PRN    dextrose 10% bolus 250 mL 250 mL PRN    dextrose 10% bolus 250 mL 250 mL PRN    epoetin zohaib injection 8,100 Units Every Tues, Thurs, Sat    famotidine tablet 20 mg Daily    gabapentin 250 mg/5 mL (5 mL) solution 125 mg Q12H    glucagon (human recombinant) injection 1 mg PRN    heparin (porcine) injection 1,000 Units PRN    hydrALAZINE injection 10 mg Q6H PRN    insulin aspart U-100 pen 0-5 Units Q4H PRN    insulin aspart U-100 pen 4 Units Q24H    insulin aspart U-100 pen 4 Units Q24H    insulin aspart U-100 pen 4 Units Q24H    insulin aspart U-100 pen 6 Units Q24H    insulin aspart U-100 pen 6 Units Q24H    insulin aspart U-100 pen 6 Units Q24H    ondansetron injection 4 mg Q6H PRN    oseltamivir capsule 30 mg Every Tues, Thurs, Sat    polyethylene glycol packet 17 g Daily    psyllium husk (aspartame) 3.4 gram packet 1 packet Daily    senna-docusate 8.6-50 mg per tablet 2 tablet Daily    sodium chloride 0.9% flush 10 mL PRN    traMADoL tablet 50 mg Q8H PRN    traZODone tablet 50 mg QHS    venlafaxine tablet 37.5 mg Daily    zolpidem tablet 5 mg Nightly PRN       Objective:     Vital Signs (Most Recent):  Temp: 97.7 °F (36.5 °C) (01/09/24 0743)  Pulse: 93 (01/09/24 1102)  Resp: 18 (01/09/24 0743)  BP: (!) 80/0 (01/09/24 0821)  SpO2: 100 % (01/09/24 0743) Vital Signs (24h Range):  Temp:  [97.4  °F (36.3 °C)-98.3 °F (36.8 °C)] 97.7 °F (36.5 °C)  Pulse:  [] 93  Resp:  [18-20] 18  SpO2:  [92 %-100 %] 100 %  BP: (72-99)/(0-76) 80/0     Weight: 68.9 kg (152 lb) (01/09/24 0743)  Body mass index is 20.61 kg/m².  Body surface area is 1.87 meters squared.    I/O last 3 completed shifts:  In: 1425 [P.O.:360; NG/GT:1065]  Out: 800 [Urine:800]     Physical Exam  Vitals and nursing note reviewed.   HENT:      Head: Normocephalic and atraumatic.      Mouth/Throat:      Mouth: Mucous membranes are dry.   Eyes:      Conjunctiva/sclera: Conjunctivae normal.   Cardiovascular:      Comments: LVAD   Pulmonary:      Effort: Pulmonary effort is normal. No respiratory distress.      Breath sounds: No wheezing or rales.      Comments: RA  Abdominal:      Palpations: Abdomen is soft.   Musculoskeletal:         General: No swelling.      Right lower leg: No edema.      Left lower leg: No edema.   Skin:     General: Skin is warm.   Neurological:      Mental Status: He is alert and oriented to person, place, and time.   Psychiatric:         Mood and Affect: Mood normal.         Behavior: Behavior normal.          Significant Labs:  CBC:   Recent Labs   Lab 01/09/24  0717   WBC 8.28   RBC 2.31*   HGB 6.5*   HCT 21.2*      MCV 92   MCH 28.1   MCHC 30.7*     CMP:   Recent Labs   Lab 01/09/24  0454   *   CALCIUM 8.6*   ALBUMIN 1.9*   PROT 6.9   *   K 3.8   CO2 21*   CL 99   *   CREATININE 5.8*   ALKPHOS 156*   ALT 26   AST 32   BILITOT 0.3     All labs within the past 24 hours have been reviewed.

## 2024-01-09 NOTE — PROGRESS NOTES
01/09/2024  Alondra Dykes    Current provider:  Sandhya Price MD    Device interrogation:      1/9/2024     7:58 AM 1/9/2024     4:30 AM 1/9/2024    12:07 AM 1/8/2024     7:50 PM 1/8/2024     4:18 PM 1/8/2024    11:57 AM 1/8/2024     7:30 AM   TXP LVAD INTERROGATIONS   Type HeartMate3 HeartMate3 HeartMate3 HeartMate3 HeartMate3 HeartMate3 HeartMate3   Flow 3.9 3.9 3.8 3.7 3.8 4 3.6   Speed 4900 4900 4900 4900 4900 4900 4900   PI 4.5 4.6 5.4 5.4 4.9 4.4 5.9   Power ([place]) 3.3 3.4 3.2 3.4 3.3 3.3 3.3   LSL 4500 4500 4500 4500 4500 4500 4500   Low Flow Alarm no no no no no no no   High Power Alarm no no no no no no no   Pulsatility Intermittent pulse    Intermittent pulse Intermittent pulse Intermittent pulse          Rounded on [facility] to ensure all mechanical assist device settings (IABP or VAD) were appropriate and all parameters were within limits.  I was able to ensure all back up equipment was present, the staff had no issues, and the Perfusion Department daily rounding was complete.      For implantable VADs: Interrogation of Ventricular assist device was performed with analysis of device parameters and review of device function. I have personally reviewed the interrogation findings and agree with findings as stated.     In emergency, the nursing units have been notified to contact the perfusion department either by:  Calling [phone] from 630am to 4pm Mon thru Fri, utilizing the On-Call Finder functionality of Epic and searching for Perfusion, or by contacting the hospital  from 4pm to 630am and on weekends and asking to speak with the perfusionist on call.    10:18 AM

## 2024-01-09 NOTE — ASSESSMENT & PLAN NOTE
- 2.2cm R. Parotid mass noted.   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 Can follow up as outpatient w/ ENT for FNA. No further interventions needed as inpatient.

## 2024-01-09 NOTE — PT/OT/SLP PROGRESS
Physical Therapy Treatment    Patient Name:  Radha Abbott   MRN:  [id number]  Admitting Diagnosis:  LVAD (left ventricular assist device) present   Recent Surgery: Procedure(s) (LRB):  CLOSURE, WOUND, STERNUM (N/A)  INSERTION, GRAFT, PERICARDIUM  DRAINAGE, PLEURAL EFFUSION 36 Days Post-Op  Admit Date: 11/9/2023  Length of Stay: 61 days    Recommendations:     Discharge Recommendations:    High Intensity Therapy  Discharge Equipment Recommendations: wheelchair, hospital bed, other (see comments) (bilateral platform rolling walker)   Barriers to discharge: Decreased caregiver support and increased need for caregiver assistance    Plan:     During this hospitalization, patient to be seen 6 x/week to address the identified rehab impairments via gait training, therapeutic activities, therapeutic exercises, neuromuscular re-education and progress towards the established goals.  Plan of Care Expires:  02/05/24  Plan of Care Reviewed with: patient, spouse    Assessment:     Radha Abbott is a 63 y.o. male admitted with a medical diagnosis of LVAD (left ventricular assist device) present. Pt agreeable to therapy session. Pt demo'd improvements this session as he increased distance gait trained but pt still with increased fwd lean and reliance on PRW for mobility and requires increased assistance for sit to stand transfers. Patient progressing well towards his goals and with good motivation and participation during sessions. Patient would benefit from skilled therapy services to maximize safety and independence, increase activity tolerance, decrease fall risk, decrease caregiver burden, improve QOL, improve patient's functional mobility, and decrease risk of contractures and pressure sores. Patient has demonstrated sufficient progression to warrant high intensity therapy evidenced by objectives noted below.    Problem List: weakness, impaired endurance, impaired self care skills, impaired functional mobility, gait instability,  "impaired balance, decreased coordination, decreased upper extremity function, decreased lower extremity function, decreased safety awareness, pain, decreased ROM, impaired cardiopulmonary response to activity, impaired fine motor.  Rehab Prognosis: Good; patient would benefit from acute skilled PT services to address these deficits and reach maximum level of function.      Goals:   Multidisciplinary Problems       Physical Therapy Goals          Problem: Physical Therapy    Goal Priority Disciplines Outcome Goal Variances Interventions   Physical Therapy Goal     PT, PT/OT Ongoing, Progressing     Description: Goals to be met by: 23     Patient will increase functional independence with mobility by performin. Sit to stand transfer with modified independence  2. Bed to chair transfer with supervision  3. Gait  x 150 feet with supervision using physician approved AD with standing rest breaks prn.   4. Lower extremity exercise program x30 reps per handout, with independence  5. Recite 3/3 sternal precautions and remain complaint with precautions throughout session with no verbal cues                     Multidisciplinary Problems (Resolved)          Problem: Physical Therapy    Goal Priority Disciplines Outcome Goal Variances Interventions   Physical Therapy Goal   (Resolved)     PT, PT/OT Met     Description: Goals to be met by: 23     Patient will increase functional independence with mobility by performin. Evaluate pt's need for physical therapy.                          Subjective     RN notified prior to session. Wife present upon PT entrance into room. Patient agreeable to PT treatment session.    Chief Complaint: "I feel better today"  Patient/Family Comments/goals: increase mobility   Pain/Comfort:  Pain Rating 1:  (unrated)  Location - Side 1: Right  Location - Orientation 1: generalized  Location 1: foot  Pain Addressed 1: Reposition, Distraction  Pain Rating Post-Intervention 1:  " (urnated)      Objective:     Additional staff present: Rehab Tech (Gabriella)    Patient found HOB elevated with: telemetry, LVAD, PICC line, NG tube   Cognition:   Alert, Cooperative, and Flat affect  Patient is oriented to Person, Place, Time, Situation  General Precautions: Standard, Cardiac fall, LVAD, sternal   Orthopedic Precautions:N/A   Braces: N/A   Body mass index is 20.61 kg/m².  Oxygen Device: Room Air  Vitals: BP (!) 82/0 (BP Location: Left arm, Patient Position: Lying)   Pulse 87   Temp 97.7 °F (36.5 °C) (Oral)   Resp 18   Ht 6' (1.829 m)   Wt 68.9 kg (152 lb)   SpO2 100%   BMI 20.61 kg/m²     Outcome Measures:  AM-PAC 6 CLICK MOBILITY  Turning over in bed (including adjusting bedclothes, sheets and blankets)?: 3  Sitting down on and standing up from a chair with arms (e.g., wheelchair, bedside commode, etc.): 2  Moving from lying on back to sitting on the side of the bed?: 3  Moving to and from a bed to a chair (including a wheelchair)?: 2  Need to walk in hospital room?: 3  Climbing 3-5 steps with a railing?: 1  Basic Mobility Total Score: 14     Functional Mobility:    Bed Mobility:   Scooting with minimum assistance  Supine > Sit with minimum assistance (for trunk)    Transfers:   Sit <> Stand Transfer:   moderate assistance and of 2 persons with platform walker x2 trials from chair  Maximum assistance and of 2 persons with platform walker x2 trials from chair   Pt only reach ~50% full stand on last two trials 2/2 fatigue   Bed <> Chair Transfer: Stand Pivot technique with maximal assistance with no assistive device    Balance:  Sitting Balance  Static: stand by assistance  Dynamic: stand by assistance  Standing:  Static: contact guard assistance with PRW  Dynamic: contact guard assistance with PRW      Gait:  Patient ambulated: 72', 64' (seated rest break between trials)   Patient required: contact guard  Patient used:  platform walker   Gait Deviation(s): occasional unsteady gait,  decreased step length, narrow base of support, decreased weight shift, ambulates outside [place] of RW, flexed posture, decreased gina, and increased reliance on PRW for UE support  all lines remained intact throughout ambulation trial  Chair follow for patient safety  LVAD: Consolidation vest utilized  LVAD: Emergency bag present throughout ambulation trial out of room  Comments: Patient required cues for scapular depression, stride length, upright posture, width of base of support, and scapular retraction and fwd gaze  to increase independence and safety. Patient required cues ~ 50% of the time. Pt with increased hip and knee flexion with fatigue. Pt's distance to PRW increased with fatigue as well requiring VC/TC to correct.     Education:  Time provided for education, counseling and discussion of health disposition in regards to patient's current status  All questions answered within PT scope of practice and to patient's satisfaction  PT role in POC to address current functional deficits  Pt educated on proper body mechanics, safety techniques, and energy conservation with PT facilitation and cueing throughout session  Call nursing/pct to transfer to chair/use bathroom. Pt stated understanding.  LVAD: patient found on wall power / returned on battery power. No alarms sounded.   LVAD: Pt able to  switch wall <> battery source with minimum assistance from therapist. (Assisted with unscrewing power cables and untangling lines)  LVAD: PT assisted pt with donning holster vest and secured waist strap    Patient left up in chair with all lines intact, call button in reach, RN notified, and wife present.    Time Tracking:     PT Received On: 01/09/24  PT Start Time: 0836     PT Stop Time: 0917  PT Total Time (min): 41 min     Billable Minutes:   Gait Training 25 minutes and Therapeutic Activity 16 minutes    Treatment Type: Treatment  PT/PTA: PT       1/9/2024

## 2024-01-09 NOTE — ASSESSMENT & PLAN NOTE
+ill contacts  Respiratory panel negative  Strep test negative.   Throat culture showing pseudomonas. As per ID , will treat w/ 5 day course of zosyn

## 2024-01-09 NOTE — PROGRESS NOTES
[facility] - [facility] Stepdown  Nephrology  Progress Note    Patient Name: Radha Abbott  MRN: [id number]  Admission Date: 11/9/2023  Hospital Length of Stay: 61 days  Attending Provider: Sandhya Price MD   Primary Care Physician: Vasu Kong MD  Principal Problem:LVAD (left ventricular assist device) present    Subjective:     Interval History: Scr 5.8 from 5.5 yesterday. Lasix challenge yesterday 160 IV. 24  ml. HD today     Review of patient's allergies indicates:  No Known Allergies  Current Facility-Administered Medications   Medication Frequency    0.9%  NaCl infusion Continuous    acetaminophen tablet 1,000 mg TID    alteplase injection 2 mg Once    amiodarone tablet 400 mg Daily    balsam peru-castor oiL Oint BID    bisacodyL suppository 10 mg Daily PRN    cholecalciferol (vitamin D3) 400 units/mL oral liquid Daily    dextrose 10 % infusion Continuous PRN    dextrose 10% bolus 125 mL 125 mL PRN    dextrose 10% bolus 125 mL 125 mL PRN    dextrose 10% bolus 250 mL 250 mL PRN    dextrose 10% bolus 250 mL 250 mL PRN    epoetin zohaib injection 8,100 Units Every Tues, Thurs, Sat    famotidine tablet 20 mg Daily    gabapentin 250 mg/5 mL (5 mL) solution 125 mg Q12H    glucagon (human recombinant) injection 1 mg PRN    heparin (porcine) injection 1,000 Units PRN    hydrALAZINE injection 10 mg Q6H PRN    insulin aspart U-100 pen 0-5 Units Q4H PRN    insulin aspart U-100 pen 4 Units Q24H    insulin aspart U-100 pen 4 Units Q24H    insulin aspart U-100 pen 4 Units Q24H    insulin aspart U-100 pen 6 Units Q24H    insulin aspart U-100 pen 6 Units Q24H    insulin aspart U-100 pen 6 Units Q24H    ondansetron injection 4 mg Q6H PRN    oseltamivir capsule 30 mg Every Tues, Thurs, Sat    polyethylene glycol packet 17 g Daily    psyllium husk (aspartame) 3.4 gram packet 1 packet Daily    senna-docusate 8.6-50 mg per tablet 2 tablet Daily    sodium chloride 0.9% flush 10 mL PRN    traMADoL tablet 50 mg Q8H PRN     traZODone tablet 50 mg QHS    venlafaxine tablet 37.5 mg Daily    zolpidem tablet 5 mg Nightly PRN       Objective:     Vital Signs (Most Recent):  Temp: 97.7 °F (36.5 °C) (01/09/24 0743)  Pulse: 93 (01/09/24 1102)  Resp: 18 (01/09/24 0743)  BP: (!) 80/0 (01/09/24 0821)  SpO2: 100 % (01/09/24 0743) Vital Signs (24h Range):  Temp:  [97.4 °F (36.3 °C)-98.3 °F (36.8 °C)] 97.7 °F (36.5 °C)  Pulse:  [] 93  Resp:  [18-20] 18  SpO2:  [92 %-100 %] 100 %  BP: (72-99)/(0-76) 80/0     Weight: 68.9 kg (152 lb) (01/09/24 0743)  Body mass index is 20.61 kg/m².  Body surface area is 1.87 meters squared.    I/O last 3 completed shifts:  In: 1425 [P.O.:360; NG/GT:1065]  Out: 800 [Urine:800]     Physical Exam  Vitals and nursing note reviewed.   HENT:      Head: Normocephalic and atraumatic.      Mouth/Throat:      Mouth: Mucous membranes are dry.   Eyes:      Conjunctiva/sclera: Conjunctivae normal.   Cardiovascular:      Comments: LVAD   Pulmonary:      Effort: Pulmonary effort is normal. No respiratory distress.      Breath sounds: No wheezing or rales.      Comments: RA  Abdominal:      Palpations: Abdomen is soft.   Musculoskeletal:         General: No swelling.      Right lower leg: No edema.      Left lower leg: No edema.   Skin:     General: Skin is warm.   Neurological:      Mental Status: He is alert and oriented to person, place, and time.   Psychiatric:         Mood and Affect: Mood normal.         Behavior: Behavior normal.          Significant Labs:  CBC:   Recent Labs   Lab 01/09/24  0717   WBC 8.28   RBC 2.31*   HGB 6.5*   HCT 21.2*      MCV 92   MCH 28.1   MCHC 30.7*     CMP:   Recent Labs   Lab 01/09/24  0454   *   CALCIUM 8.6*   ALBUMIN 1.9*   PROT 6.9   *   K 3.8   CO2 21*   CL 99   *   CREATININE 5.8*   ALKPHOS 156*   ALT 26   AST 32   BILITOT 0.3     All labs within the past 24 hours have been reviewed.         Assessment/Plan:     Cardiac/Vascular  * LVAD (left ventricular  assist device) present  -management per primary     Renal/  Acute renal failure with acute tubular necrosis superimposed on stage 3b chronic kidney disease  - suspect acute tubular injury secondary to hypotension/cardiogenic shock likely compounded by intra-arterial contrast and anemia with urinary sediment with granular casts    - Scr worsening, lasix challenge yesterday without adequate response. Plan for iHD today at bedside, UF goal 0.5L  -Hgb 7.0. Continue EPO with HD  -Continue bladder scans q shift  -strict I/O's  -renal diet/tube feeds when not NPO, volume restriction per primary team  -renally all dose medications to eGFR  -avoid nephrotoxic agents wean feasible (i.e. NSAIDs, intra-arterial contrast, supra-therapeutic vancomycin levels, etc.)          Thank you for your consult. I will follow-up with patient. Please contact us if you have any additional questions.    Yolanda Cai, RONI  Nephrology  [facility] - Cardiology Step[facility]

## 2024-01-09 NOTE — ASSESSMENT & PLAN NOTE
Lab Results   Component Value Date    CREATININE 5.8 (H) 01/09/2024     Avoid insulin stacking  Titrate insulin slowly

## 2024-01-09 NOTE — PROGRESS NOTES
[facility] - Cardiology Stepdown  Heart Transplant  Progress Note    Patient Name: Radha Abbott  MRN: [id number]  Admission Date: 11/9/2023  Hospital Length of Stay: 61 days  Attending Physician: Sandhya Price MD  Primary Care Provider: Vasu Kong MD  Principal Problem:LVAD (left ventricular assist device) present    Subjective:   Interval History: NAEON. No acute complaints. Given 160 mg IVP Lasix yesterday, total  cc/24 hours. Hgb 6.5, will give 1 unit pRBC with HD today.     Continuous Infusions:   sodium chloride 0.9%      dextrose 10 % in water (D10W)       Scheduled Meds:   sodium chloride 0.9%   Intravenous Once    acetaminophen  1,000 mg Per NG tube TID    alteplase  2 mg Intra-Catheter Once    amiodarone  400 mg Per NG tube Daily    balsam peru-castor oiL   Topical (Top) BID    cholecalciferol (vitamin D3)  2,000 Units Per NG tube Daily    epoetin zohaib (PROCRIT) injection  50 Units/kg Subcutaneous Every Tues, Thurs, Sat    famotidine  20 mg Per G Tube Daily    gabapentin  125 mg Per NG tube Q12H    insulin aspart U-100  4 Units Subcutaneous Q24H    insulin aspart U-100  4 Units Subcutaneous Q24H    insulin aspart U-100  4 Units Subcutaneous Q24H    insulin aspart U-100  6 Units Subcutaneous Q24H    insulin aspart U-100  6 Units Subcutaneous Q24H    insulin aspart U-100  6 Units Subcutaneous Q24H    oseltamivir  30 mg Oral Every Tues, Thurs, Sat    piperacillin-tazobactam (Zosyn) IV (PEDS and ADULTS) (extended infusion is not appropriate)  4.5 g Intravenous Q12H    polyethylene glycol  17 g Per NG tube Daily    psyllium husk (aspartame)  1 packet Per NG tube Daily    senna-docusate 8.6-50 mg  2 tablet Per NG tube Daily    traZODone  50 mg Per NG tube QHS    venlafaxine  37.5 mg Per G Tube Daily     PRN Meds:0.9%  NaCl infusion (for blood administration), bisacodyL, dextrose 10 % in water (D10W), dextrose 10%, dextrose 10%, dextrose 10%, dextrose 10%, glucagon (human recombinant), heparin  (porcine), hydrALAZINE, insulin aspart U-100, ondansetron, Flushing PICC/Midline Protocol **AND** [DISCONTINUED] sodium chloride 0.9% **AND** sodium chloride 0.9%, traMADoL, zolpidem    Review of patient's allergies indicates:  No Known Allergies  Objective:     Vital Signs (Most Recent):  Temp: 97.5 °F (36.4 °C) (01/09/24 1136)  Pulse: 87 (01/09/24 1136)  Resp: (!) 100 (01/09/24 1136)  BP: (!) 82/0 (01/09/24 1145)  SpO2: 100 % (01/09/24 0743) Vital Signs (24h Range):  Temp:  [97.4 °F (36.3 °C)-98.3 °F (36.8 °C)] 97.5 °F (36.4 °C)  Pulse:  [] 87  Resp:  [] 100  SpO2:  [92 %-100 %] 100 %  BP: (74-99)/(0-76) 82/0     Patient Vitals for the past 72 hrs (Last 3 readings):   Weight   01/09/24 0743 68.9 kg (152 lb)   01/08/24 1401 68 kg (150 lb)       Body mass index is 20.61 kg/m².      Intake/Output Summary (Last 24 hours) at 1/9/2024 1350  Last data filed at 1/9/2024 0930  Gross per 24 hour   Intake 368 ml   Output 750 ml   Net -382 ml         Hemodynamic Parameters:  CVP:  [6 mmHg] 6 mmHg    Telemetry: SR       Physical Exam  Constitutional:       Comments: Cachectic, temporal wasting   HENT:      Head: Normocephalic and atraumatic.   Eyes:      Conjunctiva/sclera: Conjunctivae normal.      Pupils: Pupils are equal, round, and reactive to light.   Neck:      Comments: Do not appreciate elevated JVP  Cardiovascular:      Rate and Rhythm: Normal rate and regular rhythm.      Comments: Smooth VAD hum  Pulmonary:      Effort: Pulmonary effort is normal.      Breath sounds: Normal breath sounds.   Abdominal:      General: Bowel sounds are normal.      Palpations: Abdomen is soft.   Musculoskeletal:         General: No swelling. Normal range of motion.      Cervical back: Normal range of motion and neck supple.   Skin:     General: Skin is warm and dry.      Capillary Refill: Capillary refill takes 2 to 3 seconds.   Neurological:      General: No focal deficit present.      Mental Status: He is alert and  oriented to person, place, and time.            Significant Labs:  CBC:  Recent Labs   Lab 01/08/24  0614 01/08/24  0819 01/09/24  0717   WBC 8.58  8.58 9.71 8.28   RBC 2.54*  2.54* 2.38* 2.31*   HGB 7.0*  7.0* 6.7* 6.5*   HCT 23.2*  23.2* 21.1* 21.2*   *  471* 477* 439   MCV 91  91 89 92   MCH 27.6  27.6 28.2 28.1   MCHC 30.2*  30.2* 31.8* 30.7*       BNP:  Recent Labs   Lab 01/03/24  0500 01/05/24  1059 01/08/24  0433   * 670* 652*       CMP:  Recent Labs   Lab 01/07/24  0849 01/08/24  0615 01/08/24  0621 01/09/24  0454   * 107  --  146*   CALCIUM 8.9 8.9  --  8.6*   ALBUMIN 2.0*  --  2.0* 1.9*   PROT 7.3  --  7.2 6.9   * 132*  --  132*   K 4.0 3.8  --  3.8   CO2 20* 19*  --  21*   CL 99 98  --  99   BUN 77* 96*  --  103*   CREATININE 4.9* 5.5*  --  5.8*   ALKPHOS 185*  --  183* 156*   ALT 35  --  31 26   AST 43*  --  38 32   BILITOT 0.3  --  0.3 0.3        Coagulation:   Recent Labs   Lab 01/07/24  0559 01/08/24  0614 01/09/24  0840   INR 2.7* 3.3* 3.6*       LDH:  Recent Labs   Lab 01/07/24  0559 01/07/24  0849 01/09/24  0454   * 356* 337*       Microbiology:  Microbiology Results (last 7 days)       Procedure Component Value Units Date/Time    Fungus culture [[id number]] Collected: 12/06/23 1532    Order Status: Completed Specimen: Body Fluid from Lung, Right Updated: 01/09/24 1154     Fungus (Mycology) Culture No fungus isolated after 4 weeks    Narrative:      Bronchial Wash    Throat culture [[id number]]  (Abnormal)  (Susceptibility) Collected: 01/04/24 1942    Order Status: Completed Specimen: Throat Updated: 01/08/24 1348     RESPIRATORY CULTURE - THROAT PSEUDOMONAS AERUGINOSA   Many  Normal respiratory bobby also present      Influenza A & B by Molecular [[id number]] Collected: 01/05/24 1607    Order Status: Completed Specimen: Nasopharyngeal Swab Updated: 01/05/24 1647     Influenza A, Molecular Negative     Influenza B, Molecular Negative     Flu A & B Source  NP    Group A Strep, Molecular [[id number]] Collected: 01/05/24 1004    Order Status: Completed Specimen: Throat Updated: 01/05/24 1039     Group A Strep, Molecular Negative     Comment: Arcanobacterium haemolyticum and Beta Streptococcus group C   and G will not be detected by this test method.  Please order   Throat Culture ([id number]) if suspected.         Respiratory Infection Panel (PCR), Nasopharyngeal [[id number]] Collected: 01/04/24 1942    Order Status: Completed Specimen: Nasopharyngeal Swab Updated: 01/04/24 2101     Respiratory Infection Panel Source NP Swab     Adenovirus Not Detected     Coronavirus 229E, Common Cold Virus Not Detected     Coronavirus HKU1, Common Cold Virus Not Detected     Coronavirus NL63, Common Cold Virus Not Detected     Coronavirus OC43, Common Cold Virus Not Detected     Comment: The Coronavirus strains detected in this test cause the common cold.  These strains are not the COVID-19 (novel Coronavirus)strain   associated with the respiratory disease outbreak.          SARS-CoV2 (COVID-19) Qualitative PCR Not Detected     Human Metapneumovirus Not Detected     Human Rhinovirus/Enterovirus Not Detected     Influenza A (subtypes H1, H1-2009,H3) Not Detected     Influenza B Not Detected     Parainfluenza Virus 1 Not Detected     Parainfluenza Virus 2 Not Detected     Parainfluenza Virus 3 Not Detected     Parainfluenza Virus 4 Not Detected     Respiratory Syncytial Virus Not Detected     Bordetella Parapertussis (CI2213) Not Detected     Bordetella pertussis (ptxP) Not Detected     Chlamydia pneumoniae Not Detected     Mycoplasma pneumoniae Not Detected    Narrative:      For all other respiratory sources, order [id number] -  Respiratory Viral Panel by PCR            I have reviewed all pertinent labs within the past 24 hours.    Estimated Creatinine Clearance: 13 mL/min (A) (based on SCr of 5.8 mg/dL (H)).    Diagnostic Results:  I have reviewed and interpreted all pertinent imaging  results/findings within the past 24 hours.  Assessment and Plan:     63 year old male with hx of CAD s/p 3v CABG (unclear anatomy, 2009), ICM with a recent EF of 15-20% s/p ICD (medtronik 2009), DM2 (a1c 7.7), HTN, HLD, Vfib on amio who presents to the ED with CC of SOB     Pt was recently admitted to [facility] as a transfer.  He was started on a Lasix gtt and did well.  Started on gDMT and discharged home on  5 with plans to follow up in [facility] clinic for ongoing transplant evaluation at another facility.  He says that about a few days ago he started to notice LE swelling.  This turned into Nelson and orthopnea.  He says he can't walk to the bathroom without getting SOB.  He also complains of weight gain.  He takes torsemide 20mg BID at home and was told to trial additional Lasix which he did without any improvement.  He was rx metolazone but has not been filled.  He came to the ED     In the ED he was AF with stable VS on RA.  CBC showing chronic anemia.  CMP notable for acute on chronic CKD with baseline around 2.1 and Cr now 2.6.  BNP elevated.  Lactate neg.  CXR showing pulmonary edema.  I evaluated the pt at the bedside.  Bedside TTE showing CVP >15.  He was subsequently admitted to the CCU for diuresis.     He was continued on his home  and was started on a lasix gtt, which he responded well to overnight (net -1700cc. He feels much better this morning as well. Our transplant coordinators have been working on insurance approval and he is now being transferred to [facility] service for transplant evaluation.     * LVAD (left ventricular assist device) present  -HeartMate 3 Implanted  12/1/2023  as DT  -[facility]  -Implanted by Dr. Washington  -Hold Coumadin, dosing by PharmD.  Goal INR 2.0-3.0 . Supratherapeutic today.   -Antiplatelets Not on ASA  -LDH is stable overall today. Will continue to monitor daily.  -Weaned off midodrine.  -Speed set at  4900   rpm, LSL 4500 rpm   -Interrogation notable for no events  -ECHO 1/2/24  mild TR, LVEDD 6.25 with HM3 set to 4900 rpm   -Not listed for OHTx, declined for OHTX due to comorbidities   -PT/OT/Speech. Recommending rehab but HD+LVAD may be barrier.       Procedure: Device Interrogation Including analysis of device parameters  Current Settings: Ventricular Assist Device  Review of device function is stable/unstable stable        1/9/2024    11:29 AM 1/9/2024     7:58 AM 1/9/2024     4:30 AM 1/9/2024    12:07 AM 1/8/2024     7:50 PM 1/8/2024     4:18 PM 1/8/2024    11:57 AM   TXP LVAD INTERROGATIONS   Type HeartMate3 HeartMate3 HeartMate3 HeartMate3 HeartMate3 HeartMate3 HeartMate3   Flow 4 3.9 3.9 3.8 3.7 3.8 4   Speed 4900 4900 4900 4900 4900 4900 4900   PI 5.4 4.5 4.6 5.4 5.4 4.9 4.4   Power ([place]) 3.36 3.3 3.4 3.2 3.4 3.3 3.3   LSL 4500 4500 4500 4500 4500 4500 4500   Low Flow Alarm no no no no no no no   High Power Alarm no no no no no no no   Pulsatility Intermittent pulse Intermittent pulse    Intermittent pulse Intermittent pulse         Right parotid mass  - 2.2cm R. Parotid mass noted.   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 Can follow up as outpatient w/ ENT for FNA. No further interventions needed as inpatient.     Lymphadenopathy  +ill contacts  Respiratory panel negative  Strep test negative.   Throat culture showing pseudomonas. As per ID , will treat w/ 5 day course of zosyn    Unilateral complete vocal fold paralysis  -Appreciate ENT's help  -S/P augmentation of paralyzed left vocal cord 12/18    Dysphonia  -Speech following closely  -Appreciate ENT's help. S/P augmentation of paralyzed left vocal cord 12/18    Moderate malnutrition  -RD and SLP following  -Tolerating tube feeds. Total daily caloric intake increased to 2160  -Failed MBBS 12/20, continue TF.  -Speech following. Plan to repeat swallow study today     Depressed mood  -Psychiatry consulted for depressed mood, SI when left alone  -Recommend sitter when alone (possibly with transition to  stepdown).  -Psych reconsulted 1/2, agree with venlafaxine and trazadone    IVÁN (acute kidney injury)  -Nephrology following      Acute on chronic combined systolic and diastolic CHF, NYHA class 4  Pt with known ICM with an EF of 25% on home  presented with decompensated HF.    -S/P LVAD    PAF (paroxysmal atrial fibrillation)  Known hx of pAF. In sinus rhythm on admission, but 1 run of AF RVR overnight. He spontaneously converted.  - Continue home amiodarone 400mg qd  - Continue AC, on hold with supratherapeutic INR       Acute renal failure with acute tubular necrosis superimposed on stage 3b chronic kidney disease  IVÁN on CKD2. Baseline Cr of 1.7-2.0.   - Hold ARNi  -Trend BMPs daily   -Nephrology following   -SLED/SCUF for volume removal began on 12/1. He is anuric  -Did not respond to diuretic challenge with 240 mg IV Lasix, 1000 mg Diuril, and 500 mg IV Diamox done 12/14  -Diuretic challenge yesterday with 160 mg IVP Lasix, total  cc/24 hours   -Now on HD TThSat  -Tunneled trialysis line placed 12/22 by IR    Type 2 diabetes mellitus without complication, without long-term current use of insulin  -A1c 7.6, not insulin dependent  - Endocrine following, appreciate assistance with blood glucose management    CAD (coronary artery disease)  -S/p 3vCABG in 2009  -Lipitor on hold 2/2 mild transaminitis  -Not on ASA post VAD    Ventricular tachycardia  Hx VT s/p ICD placement (medtronic 2009)  - Continue home amio 400mg qd        Vero Lozada PA-C  Heart Transplant  [facility] - Cardiology Stepdown

## 2024-01-09 NOTE — SUBJECTIVE & OBJECTIVE
Interval History: NAEON. No acute complaints. Given 160 mg IVP Lasix yesterday, total  cc/24 hours. Hgb 6.5, will give 1 unit pRBC with HD today.     Continuous Infusions:   sodium chloride 0.9%      dextrose 10 % in water (D10W)       Scheduled Meds:   sodium chloride 0.9%   Intravenous Once    acetaminophen  1,000 mg Per NG tube TID    alteplase  2 mg Intra-Catheter Once    amiodarone  400 mg Per NG tube Daily    balsam peru-castor oiL   Topical (Top) BID    cholecalciferol (vitamin D3)  2,000 Units Per NG tube Daily    epoetin zohiab (PROCRIT) injection  50 Units/kg Subcutaneous Every Tues, Thurs, Sat    famotidine  20 mg Per G Tube Daily    gabapentin  125 mg Per NG tube Q12H    insulin aspart U-100  4 Units Subcutaneous Q24H    insulin aspart U-100  4 Units Subcutaneous Q24H    insulin aspart U-100  4 Units Subcutaneous Q24H    insulin aspart U-100  6 Units Subcutaneous Q24H    insulin aspart U-100  6 Units Subcutaneous Q24H    insulin aspart U-100  6 Units Subcutaneous Q24H    oseltamivir  30 mg Oral Every Tues, Thurs, Sat    piperacillin-tazobactam (Zosyn) IV (PEDS and ADULTS) (extended infusion is not appropriate)  4.5 g Intravenous Q12H    polyethylene glycol  17 g Per NG tube Daily    psyllium husk (aspartame)  1 packet Per NG tube Daily    senna-docusate 8.6-50 mg  2 tablet Per NG tube Daily    traZODone  50 mg Per NG tube QHS    venlafaxine  37.5 mg Per G Tube Daily     PRN Meds:0.9%  NaCl infusion (for blood administration), bisacodyL, dextrose 10 % in water (D10W), dextrose 10%, dextrose 10%, dextrose 10%, dextrose 10%, glucagon (human recombinant), heparin (porcine), hydrALAZINE, insulin aspart U-100, ondansetron, Flushing PICC/Midline Protocol **AND** [DISCONTINUED] sodium chloride 0.9% **AND** sodium chloride 0.9%, traMADoL, zolpidem    Review of patient's allergies indicates:  No Known Allergies  Objective:     Vital Signs (Most Recent):  Temp: 97.5 °F (36.4 °C) (01/09/24 1136)  Pulse: 87  (01/09/24 1136)  Resp: (!) 100 (01/09/24 1136)  BP: (!) 82/0 (01/09/24 1145)  SpO2: 100 % (01/09/24 0743) Vital Signs (24h Range):  Temp:  [97.4 °F (36.3 °C)-98.3 °F (36.8 °C)] 97.5 °F (36.4 °C)  Pulse:  [] 87  Resp:  [] 100  SpO2:  [92 %-100 %] 100 %  BP: (74-99)/(0-76) 82/0     Patient Vitals for the past 72 hrs (Last 3 readings):   Weight   01/09/24 0743 68.9 kg (152 lb)   01/08/24 1401 68 kg (150 lb)       Body mass index is 20.61 kg/m².      Intake/Output Summary (Last 24 hours) at 1/9/2024 1350  Last data filed at 1/9/2024 0930  Gross per 24 hour   Intake 368 ml   Output 750 ml   Net -382 ml         Hemodynamic Parameters:  CVP:  [6 mmHg] 6 mmHg    Telemetry: SR       Physical Exam  Constitutional:       Comments: Cachectic, temporal wasting   HENT:      Head: Normocephalic and atraumatic.   Eyes:      Conjunctiva/sclera: Conjunctivae normal.      Pupils: Pupils are equal, round, and reactive to light.   Neck:      Comments: Do not appreciate elevated JVP  Cardiovascular:      Rate and Rhythm: Normal rate and regular rhythm.      Comments: Smooth VAD hum  Pulmonary:      Effort: Pulmonary effort is normal.      Breath sounds: Normal breath sounds.   Abdominal:      General: Bowel sounds are normal.      Palpations: Abdomen is soft.   Musculoskeletal:         General: No swelling. Normal range of motion.      Cervical back: Normal range of motion and neck supple.   Skin:     General: Skin is warm and dry.      Capillary Refill: Capillary refill takes 2 to 3 seconds.   Neurological:      General: No focal deficit present.      Mental Status: He is alert and oriented to person, place, and time.            Significant Labs:  CBC:  Recent Labs   Lab 01/08/24  0614 01/08/24  0819 01/09/24  0717   WBC 8.58  8.58 9.71 8.28   RBC 2.54*  2.54* 2.38* 2.31*   HGB 7.0*  7.0* 6.7* 6.5*   HCT 23.2*  23.2* 21.1* 21.2*   *  471* 477* 439   MCV 91  91 89 92   MCH 27.6  27.6 28.2 28.1   MCHC 30.2*   30.2* 31.8* 30.7*       BNP:  Recent Labs   Lab 01/03/24  0500 01/05/24  1059 01/08/24  0433   * 670* 652*       CMP:  Recent Labs   Lab 01/07/24  0849 01/08/24  0615 01/08/24  0621 01/09/24  0454   * 107  --  146*   CALCIUM 8.9 8.9  --  8.6*   ALBUMIN 2.0*  --  2.0* 1.9*   PROT 7.3  --  7.2 6.9   * 132*  --  132*   K 4.0 3.8  --  3.8   CO2 20* 19*  --  21*   CL 99 98  --  99   BUN 77* 96*  --  103*   CREATININE 4.9* 5.5*  --  5.8*   ALKPHOS 185*  --  183* 156*   ALT 35  --  31 26   AST 43*  --  38 32   BILITOT 0.3  --  0.3 0.3        Coagulation:   Recent Labs   Lab 01/07/24  0559 01/08/24  0614 01/09/24  0840   INR 2.7* 3.3* 3.6*       LDH:  Recent Labs   Lab 01/07/24  0559 01/07/24  0849 01/09/24  0454   * 356* 337*       Microbiology:  Microbiology Results (last 7 days)       Procedure Component Value Units Date/Time    Fungus culture [[id number]] Collected: 12/06/23 1532    Order Status: Completed Specimen: Body Fluid from Lung, Right Updated: 01/09/24 1154     Fungus (Mycology) Culture No fungus isolated after 4 weeks    Narrative:      Bronchial Wash    Throat culture [[id number]]  (Abnormal)  (Susceptibility) Collected: 01/04/24 1942    Order Status: Completed Specimen: Throat Updated: 01/08/24 1348     RESPIRATORY CULTURE - THROAT PSEUDOMONAS AERUGINOSA   Many  Normal respiratory bobby also present      Influenza A & B by Molecular [[id number]] Collected: 01/05/24 1607    Order Status: Completed Specimen: Nasopharyngeal Swab Updated: 01/05/24 1647     Influenza A, Molecular Negative     Influenza B, Molecular Negative     Flu A & B Source NP    Group A Strep, Molecular [[id number]] Collected: 01/05/24 1004    Order Status: Completed Specimen: Throat Updated: 01/05/24 1039     Group A Strep, Molecular Negative     Comment: Arcanobacterium haemolyticum and Beta Streptococcus group C   and G will not be detected by this test method.  Please order   Throat Culture ([id number]) if  suspected.         Respiratory Infection Panel (PCR), Nasopharyngeal [[id number]] Collected: 01/04/24 1942    Order Status: Completed Specimen: Nasopharyngeal Swab Updated: 01/04/24 2101     Respiratory Infection Panel Source NP Swab     Adenovirus Not Detected     Coronavirus 229E, Common Cold Virus Not Detected     Coronavirus HKU1, Common Cold Virus Not Detected     Coronavirus NL63, Common Cold Virus Not Detected     Coronavirus OC43, Common Cold Virus Not Detected     Comment: The Coronavirus strains detected in this test cause the common cold.  These strains are not the COVID-19 (novel Coronavirus)strain   associated with the respiratory disease outbreak.          SARS-CoV2 (COVID-19) Qualitative PCR Not Detected     Human Metapneumovirus Not Detected     Human Rhinovirus/Enterovirus Not Detected     Influenza A (subtypes H1, H1-2009,H3) Not Detected     Influenza B Not Detected     Parainfluenza Virus 1 Not Detected     Parainfluenza Virus 2 Not Detected     Parainfluenza Virus 3 Not Detected     Parainfluenza Virus 4 Not Detected     Respiratory Syncytial Virus Not Detected     Bordetella Parapertussis ([id number]) Not Detected     Bordetella pertussis (ptxP) Not Detected     Chlamydia pneumoniae Not Detected     Mycoplasma pneumoniae Not Detected    Narrative:      For all other respiratory sources, order [id number] -  Respiratory Viral Panel by PCR            I have reviewed all pertinent labs within the past 24 hours.    Estimated Creatinine Clearance: 13 mL/min (A) (based on SCr of 5.8 mg/dL (H)).    Diagnostic Results:  I have reviewed and interpreted all pertinent imaging results/findings within the past 24 hours.

## 2024-01-09 NOTE — PT/OT/SLP PROGRESS
Speech Language Pathology Treatment    Patient Name:  Radha Abbott   MRN:  [id number]  Admitting Diagnosis: LVAD (left ventricular assist device) present    Recommendations:                 General Recommendations:  Dysphagia therapy  Diet recommendations:  Minced & Moist Diet - IDDSI Level 5, Liquid Diet Level: Moderately thick/Honey thick liquids - IDDSI Level 3   All liquids MUST be a honey thick consistency - Pt is a silent aspirator.   Pre-thickened liquids level 3 with yellow triangle - MODERATELY THICK  Can also thicken using thickener packets, 1 full pack to 4 oz of fluid   Can continue with ice chips only when completing dysphagia exercises  Encourage double swallows between bites and sips to clear residue   Aspiration Precautions: 1 bite/sip at a time, Continue alternate means of nutrition, Double swallow with each bite/sip, HOB to 90 degrees, Small bites/sips, and Standard aspiration precautions   General Precautions: Standard, LVAD, fall, aspiration, sternal, honey thick  Communication strategies:  none    Assessment:     Radha Abbott is a 63 y.o. male with an SLP diagnosis of Dysphagia. SLP will continue to follow.     Subjective     Spoke with RN prior to session. Pt awake/alert sitting upright in bedside chair s/p OT session. Spouse at bedside.     Pain/Comfort:  Pain Rating 1: 0/10  Pain Rating Post-Intervention 1: 0/10    Respiratory Status: Room air    Objective:     Has the patient been evaluated by SLP for swallowing?   Yes  Keep patient NPO? No     Pt seen bedside for ongoing dysphagia therapy. He reports tolerance of soft, minced and moist solids and honey thick liquids. Reviewed all dysphagia exercises and safes wallow precautions. Worked on intake of single sips of honey thick liquids, puree solids and minced and moist solids (cracker finely crushed and mixed in puree) with use of effortful and double swallow patterns. Pt required MOD cuing initially, though able to demonstrate carryover across  remaining trials. Pt having difficulty initiating second swallow at this time.  Noted pt demonstrating a forward head position and throat clearing intermittently across trials. Pt reports his niece, who is a speech therapist, recommended he use a chin tuck for difficulty swallowing. Reiterated that pt does not need to implement chin tuck or supraglottic swallow with PO intake and reviewed results of recent MBSS and acute SLP recommendations.  Reviewed dysphagia exercises and worked on implementing following cues. Pt able to demonstrate a Judy maneuver x1 falsetto x3 following clinician model. Continue to recommend he implement dysphagia exercises 3x/day x10 reps each with handout at bedside. Spouse verbalized understanding and in agreement, though expressing concern about weight gain despite NGT feeds and PO intake. All questions answered within SLP scope. Education provided re: role of SLP, ongoing diet recs, swallow precs, s/sx aspiration, silent aspiration on recent MBSS and POC.  Pt verbalized understanding and agreement. SLP will continue to follow.      Goals:   Multidisciplinary Problems       SLP Goals          Problem: SLP    Goal Priority Disciplines Outcome   SLP Goal     SLP Ongoing, Progressing   Description: Speech Language Pathology Goals  Goals expected to be met by 12/24    1. Pt will participate in ongoing assessment of swallow function to help determine least restrictive diet                          Plan:     Patient to be seen:  4 x/week   Plan of Care expires:     Plan of Care reviewed with:  patient, spouse   SLP Follow-Up:  Yes       Discharge recommendations:  High Intensity Therapy     Time Tracking:     SLP Treatment Date:   01/09/24  Speech Start Time:  1050  Speech Stop Time:  1113     Speech Total Time (min):  23 min    Billable Minutes: Treatment Swallowing Dysfunction 13 and Self Care/Home Management Training 10    01/09/2024

## 2024-01-09 NOTE — PLAN OF CARE
Pt free of falls and injury. Pt denies any pain or discomfort. Pt AAOx4. Fall precautions remain in place. LVAD hum present and smooth. VAD numbers and dopplers WDL. DLES dressing CDI. Plan of care reviewed with pt. Will continue to monitor pt.      Problem: Adult Inpatient Plan of Care  Goal: Plan of Care Review  Outcome: Ongoing, Progressing  Goal: Patient-Specific Goal (Individualized)  Outcome: Ongoing, Progressing  Goal: Absence of Hospital-Acquired Illness or Injury  Outcome: Ongoing, Progressing  Goal: Optimal Comfort and Wellbeing  Outcome: Ongoing, Progressing  Goal: Readiness for Transition of Care  Outcome: Ongoing, Progressing     Problem: Diabetes Comorbidity  Goal: Blood Glucose Level Within Targeted Range  Outcome: Ongoing, Progressing     Problem: Fluid and Electrolyte Imbalance (Acute Kidney Injury/Impairment)  Goal: Fluid and Electrolyte Balance  Outcome: Ongoing, Progressing     Problem: Oral Intake Inadequate (Acute Kidney Injury/Impairment)  Goal: Optimal Nutrition Intake  Outcome: Ongoing, Progressing     Problem: Renal Function Impairment (Acute Kidney Injury/Impairment)  Goal: Effective Renal Function  Outcome: Ongoing, Progressing     Problem: Infection  Goal: Absence of Infection Signs and Symptoms  Outcome: Ongoing, Progressing     Problem: Adjustment to Illness (Heart Failure)  Goal: Optimal Coping  Outcome: Ongoing, Progressing     Problem: Cardiac Output Decreased (Heart Failure)  Goal: Optimal Cardiac Output  Outcome: Ongoing, Progressing     Problem: Dysrhythmia (Heart Failure)  Goal: Stable Heart Rate and Rhythm  Outcome: Ongoing, Progressing     Problem: Fluid Imbalance (Heart Failure)  Goal: Fluid Balance  Outcome: Ongoing, Progressing     Problem: Functional Ability Impaired (Heart Failure)  Goal: Optimal Functional Ability  Outcome: Ongoing, Progressing     Problem: Oral Intake Inadequate (Heart Failure)  Goal: Optimal Nutrition Intake  Outcome: Ongoing, Progressing     Problem:  Respiratory Compromise (Heart Failure)  Goal: Effective Oxygenation and Ventilation  Outcome: Ongoing, Progressing     Problem: Sleep Disordered Breathing (Heart Failure)  Goal: Effective Breathing Pattern During Sleep  Outcome: Ongoing, Progressing     Problem: Cardiac Output Decreased  Goal: Effective Cardiac Output  Outcome: Ongoing, Progressing     Problem: Fall Injury Risk  Goal: Absence of Fall and Fall-Related Injury  Outcome: Ongoing, Progressing     Problem: Coping Ineffective  Goal: Effective Coping  Outcome: Ongoing, Progressing     Problem: Skin Injury Risk Increased  Goal: Skin Health and Integrity  Outcome: Ongoing, Progressing     Problem: Adjustment to Device (Ventricular Assist Device)  Goal: Optimal Adjustment to Device  Outcome: Ongoing, Progressing     Problem: Bleeding (Ventricular Assist Device)  Goal: Absence of Bleeding  Outcome: Ongoing, Progressing     Problem: Embolism (Ventricular Assist Device)  Goal: Absence of Embolism Signs and Symptoms  Outcome: Ongoing, Progressing     Problem: Hemodynamic Instability (Ventricular Assist Device)  Goal: Optimal Blood Flow  Outcome: Ongoing, Progressing     Problem: Infection (Ventricular Assist Device)  Goal: Absence of Infection Signs and Symptoms  Outcome: Ongoing, Progressing     Problem: Right-Sided Heart Compromise (Ventricular Assist Device)  Goal: Effective Right-Sided Heart Function  Outcome: Ongoing, Progressing     Problem: Adjustment to Illness (Sepsis/Septic Shock)  Goal: Optimal Coping  Outcome: Ongoing, Progressing     Problem: Bleeding (Sepsis/Septic Shock)  Goal: Absence of Bleeding  Outcome: Ongoing, Progressing     Problem: Glycemic Control Impaired (Sepsis/Septic Shock)  Goal: Blood Glucose Level Within Desired Range  Outcome: Ongoing, Progressing     Problem: Infection Progression (Sepsis/Septic Shock)  Goal: Absence of Infection Signs and Symptoms  Outcome: Ongoing, Progressing     Problem: Nutrition Impaired (Sepsis/Septic  Shock)  Goal: Optimal Nutrition Intake  Outcome: Ongoing, Progressing     Problem: Impaired Wound Healing  Goal: Optimal Wound Healing  Outcome: Ongoing, Progressing     Problem: Device-Related Complication Risk (Hemodialysis)  Goal: Safe, Effective Therapy Delivery  Outcome: Ongoing, Progressing     Problem: Hemodynamic Instability (Hemodialysis)  Goal: Effective Tissue Perfusion  Outcome: Ongoing, Progressing     Problem: Infection (Hemodialysis)  Goal: Absence of Infection Signs and Symptoms  Outcome: Ongoing, Progressing

## 2024-01-09 NOTE — ASSESSMENT & PLAN NOTE
- suspect acute tubular injury secondary to hypotension/cardiogenic shock likely compounded by intra-arterial contrast and anemia with urinary sediment with granular casts    - Scr worsening, lasix challenge yesterday without adequate response. Plan for iHD today at bedside, UF goal 0.5L  -Hgb 7.0. Continue EPO with HD  -Continue bladder scans q shift  -strict I/O's  -renal diet/tube feeds when not NPO, volume restriction per primary team  -renally all dose medications to eGFR  -avoid nephrotoxic agents wean feasible (i.e. NSAIDs, intra-arterial contrast, supra-therapeutic vancomycin levels, etc.)

## 2024-01-09 NOTE — ASSESSMENT & PLAN NOTE
IVÁN on CKD2. Baseline Cr of 1.7-2.0.   - Hold ARNi  -Trend BMPs daily   -Nephrology following   -SLED/SCUF for volume removal began on 12/1. He is anuric  -Did not respond to diuretic challenge with 240 mg IV Lasix, 1000 mg Diuril, and 500 mg IV Diamox done 12/14  -Diuretic challenge yesterday with 160 mg IVP Lasix, total  cc/24 hours   -Now on HD TThSat  -Tunneled trialysis line placed 12/22 by MILAN

## 2024-01-09 NOTE — ASSESSMENT & PLAN NOTE
Known hx of pAF. In sinus rhythm on admission, but 1 run of AF RVR overnight. He spontaneously converted.  - Continue home amiodarone 400mg qd  - Continue AC, on hold with supratherapeutic INR

## 2024-01-09 NOTE — ASSESSMENT & PLAN NOTE
-HeartMate 3 Implanted  12/1/2023  as DT  -HTS Primary  -Implanted by Dr. Washington  -Hold Coumadin, dosing by PharmD.  Goal INR 2.0-3.0 . Supratherapeutic today.   -Antiplatelets Not on ASA  -LDH is stable overall today. Will continue to monitor daily.  -Weaned off midodrine.  -Speed set at  4900   rpm, LSL 4500 rpm   -Interrogation notable for no events  -ECHO 1/2/24 mild TR, LVEDD 6.25 with HM3 set to 4900 rpm   -Not listed for OHTx, declined for OHTX due to comorbidities   -PT/OT/Speech. Recommending rehab but HD+LVAD may be barrier.       Procedure: Device Interrogation Including analysis of device parameters  Current Settings: Ventricular Assist Device  Review of device function is stable/unstable stable        1/9/2024    11:29 AM 1/9/2024     7:58 AM 1/9/2024     4:30 AM 1/9/2024    12:07 AM 1/8/2024     7:50 PM 1/8/2024     4:18 PM 1/8/2024    11:57 AM   TXP LVAD INTERROGATIONS   Type HeartMate3 HeartMate3 HeartMate3 HeartMate3 HeartMate3 HeartMate3 HeartMate3   Flow 4 3.9 3.9 3.8 3.7 3.8 4   Speed 4900 4900 4900 4900 4900 4900 4900   PI 5.4 4.5 4.6 5.4 5.4 4.9 4.4   Power ([place]) 3.36 3.3 3.4 3.2 3.4 3.3 3.3   LSL 4500 4500 4500 4500 4500 4500 4500   Low Flow Alarm no no no no no no no   High Power Alarm no no no no no no no   Pulsatility Intermittent pulse Intermittent pulse    Intermittent pulse Intermittent pulse

## 2024-01-10 LAB
ALBUMIN SERPL BCP-MCNC: 1.9 G/DL (ref 3.5–5.2)
ALP SERPL-CCNC: 164 U/L (ref 55–135)
ALT SERPL W/O P-5'-P-CCNC: 26 U/L (ref 10–44)
ANION GAP SERPL CALC-SCNC: 14 MMOL/L (ref 8–16)
AST SERPL-CCNC: 29 U/L (ref 10–40)
BASOPHILS # BLD AUTO: 0.06 K/UL (ref 0–0.2)
BASOPHILS NFR BLD: 0.7 % (ref 0–1.9)
BILIRUB DIRECT SERPL-MCNC: 0.1 MG/DL (ref 0.1–0.3)
BILIRUB SERPL-MCNC: 0.3 MG/DL (ref 0.1–1)
BLD PROD TYP BPU: NORMAL
BLOOD UNIT EXPIRATION DATE: NORMAL
BLOOD UNIT TYPE CODE: 6200
BLOOD UNIT TYPE: NORMAL
BNP SERPL-MCNC: 683 PG/ML (ref 0–99)
BUN SERPL-MCNC: 107 MG/DL (ref 8–23)
CALCIUM SERPL-MCNC: 8.8 MG/DL (ref 8.7–10.5)
CHLORIDE SERPL-SCNC: 98 MMOL/L (ref 95–110)
CO2 SERPL-SCNC: 21 MMOL/L (ref 23–29)
CODING SYSTEM: NORMAL
CREAT SERPL-MCNC: 6 MG/DL (ref 0.5–1.4)
CROSSMATCH INTERPRETATION: NORMAL
CRP SERPL-MCNC: 69.4 MG/L (ref 0–8.2)
DIFFERENTIAL METHOD BLD: ABNORMAL
DISPENSE STATUS: NORMAL
EOSINOPHIL # BLD AUTO: 0.3 K/UL (ref 0–0.5)
EOSINOPHIL NFR BLD: 4.1 % (ref 0–8)
ERYTHROCYTE [DISTWIDTH] IN BLOOD BY AUTOMATED COUNT: 17 % (ref 11.5–14.5)
EST. GFR  (NO RACE VARIABLE): 10 ML/MIN/1.73 M^2
GLUCOSE SERPL-MCNC: 129 MG/DL (ref 70–110)
HCT VFR BLD AUTO: 21 % (ref 40–54)
HGB BLD-MCNC: 6.4 G/DL (ref 14–18)
IMM GRANULOCYTES # BLD AUTO: 0.07 K/UL (ref 0–0.04)
IMM GRANULOCYTES NFR BLD AUTO: 0.8 % (ref 0–0.5)
INR PPP: 3.8 (ref 0.8–1.2)
LDH SERPL L TO P-CCNC: 330 U/L (ref 110–260)
LYMPHOCYTES # BLD AUTO: 0.8 K/UL (ref 1–4.8)
LYMPHOCYTES NFR BLD: 9.4 % (ref 18–48)
MAGNESIUM SERPL-MCNC: 2.4 MG/DL (ref 1.6–2.6)
MCH RBC QN AUTO: 27.9 PG (ref 27–31)
MCHC RBC AUTO-ENTMCNC: 30.5 G/DL (ref 32–36)
MCV RBC AUTO: 92 FL (ref 82–98)
MONOCYTES # BLD AUTO: 0.6 K/UL (ref 0.3–1)
MONOCYTES NFR BLD: 7.1 % (ref 4–15)
NEUTROPHILS # BLD AUTO: 6.5 K/UL (ref 1.8–7.7)
NEUTROPHILS NFR BLD: 77.9 % (ref 38–73)
NRBC BLD-RTO: 0 /100 WBC
NUM UNITS TRANS PACKED RBC: NORMAL
PHOSPHATE SERPL-MCNC: 3.4 MG/DL (ref 2.7–4.5)
PLATELET # BLD AUTO: 452 K/UL (ref 150–450)
PMV BLD AUTO: 9.7 FL (ref 9.2–12.9)
POCT GLUCOSE: 117 MG/DL (ref 70–110)
POCT GLUCOSE: 126 MG/DL (ref 70–110)
POCT GLUCOSE: 133 MG/DL (ref 70–110)
POCT GLUCOSE: 135 MG/DL (ref 70–110)
POCT GLUCOSE: 158 MG/DL (ref 70–110)
POCT GLUCOSE: 166 MG/DL (ref 70–110)
POTASSIUM SERPL-SCNC: 3.5 MMOL/L (ref 3.5–5.1)
PREALB SERPL-MCNC: 36 MG/DL (ref 20–43)
PROT SERPL-MCNC: 7 G/DL (ref 6–8.4)
PROTHROMBIN TIME: 37.1 SEC (ref 9–12.5)
RBC # BLD AUTO: 2.29 M/UL (ref 4.6–6.2)
SODIUM SERPL-SCNC: 133 MMOL/L (ref 136–145)
WBC # BLD AUTO: 8.29 K/UL (ref 3.9–12.7)

## 2024-01-10 PROCEDURE — P9016 RBC LEUKOCYTES REDUCED: HCPCS | Performed by: PHYSICIAN ASSISTANT

## 2024-01-10 PROCEDURE — 84100 ASSAY OF PHOSPHORUS: CPT | Performed by: INTERNAL MEDICINE

## 2024-01-10 PROCEDURE — 25000003 PHARM REV CODE 250: Performed by: INTERNAL MEDICINE

## 2024-01-10 PROCEDURE — 99233 SBSQ HOSP IP/OBS HIGH 50: CPT | Mod: ,,, | Performed by: PHYSICIAN ASSISTANT

## 2024-01-10 PROCEDURE — 27000248 HC VAD-ADDITIONAL DAY

## 2024-01-10 PROCEDURE — 83880 ASSAY OF NATRIURETIC PEPTIDE: CPT | Performed by: STUDENT IN AN ORGANIZED HEALTH CARE EDUCATION/TRAINING PROGRAM

## 2024-01-10 PROCEDURE — 85610 PROTHROMBIN TIME: CPT | Performed by: PHYSICIAN ASSISTANT

## 2024-01-10 PROCEDURE — 97530 THERAPEUTIC ACTIVITIES: CPT

## 2024-01-10 PROCEDURE — 83615 LACTATE (LD) (LDH) ENZYME: CPT | Performed by: STUDENT IN AN ORGANIZED HEALTH CARE EDUCATION/TRAINING PROGRAM

## 2024-01-10 PROCEDURE — 80100014 HC HEMODIALYSIS 1:1

## 2024-01-10 PROCEDURE — 93750 INTERROGATION VAD IN PERSON: CPT | Mod: ,,, | Performed by: INTERNAL MEDICINE

## 2024-01-10 PROCEDURE — 25000242 PHARM REV CODE 250 ALT 637 W/ HCPCS: Performed by: INTERNAL MEDICINE

## 2024-01-10 PROCEDURE — 86140 C-REACTIVE PROTEIN: CPT | Performed by: STUDENT IN AN ORGANIZED HEALTH CARE EDUCATION/TRAINING PROGRAM

## 2024-01-10 PROCEDURE — 80048 BASIC METABOLIC PNL TOTAL CA: CPT | Performed by: INTERNAL MEDICINE

## 2024-01-10 PROCEDURE — 99233 SBSQ HOSP IP/OBS HIGH 50: CPT | Mod: ,,, | Performed by: INTERNAL MEDICINE

## 2024-01-10 PROCEDURE — 51798 US URINE CAPACITY MEASURE: CPT

## 2024-01-10 PROCEDURE — 27000207 HC ISOLATION

## 2024-01-10 PROCEDURE — 83735 ASSAY OF MAGNESIUM: CPT | Performed by: INTERNAL MEDICINE

## 2024-01-10 PROCEDURE — 25000003 PHARM REV CODE 250: Performed by: NURSE PRACTITIONER

## 2024-01-10 PROCEDURE — 25000003 PHARM REV CODE 250

## 2024-01-10 PROCEDURE — 63600175 PHARM REV CODE 636 W HCPCS: Performed by: INTERNAL MEDICINE

## 2024-01-10 PROCEDURE — 80076 HEPATIC FUNCTION PANEL: CPT | Performed by: INTERNAL MEDICINE

## 2024-01-10 PROCEDURE — 85025 COMPLETE CBC W/AUTO DIFF WBC: CPT | Performed by: PHYSICIAN ASSISTANT

## 2024-01-10 PROCEDURE — 97535 SELF CARE MNGMENT TRAINING: CPT

## 2024-01-10 PROCEDURE — 63600175 PHARM REV CODE 636 W HCPCS: Mod: JZ,JG | Performed by: STUDENT IN AN ORGANIZED HEALTH CARE EDUCATION/TRAINING PROGRAM

## 2024-01-10 PROCEDURE — 36430 TRANSFUSION BLD/BLD COMPNT: CPT

## 2024-01-10 PROCEDURE — 25000003 PHARM REV CODE 250: Performed by: STUDENT IN AN ORGANIZED HEALTH CARE EDUCATION/TRAINING PROGRAM

## 2024-01-10 PROCEDURE — 99232 SBSQ HOSP IP/OBS MODERATE 35: CPT | Mod: ,,, | Performed by: NURSE PRACTITIONER

## 2024-01-10 PROCEDURE — 84134 ASSAY OF PREALBUMIN: CPT | Performed by: NURSE PRACTITIONER

## 2024-01-10 PROCEDURE — 20600001 HC STEP DOWN PRIVATE ROOM

## 2024-01-10 RX ADMIN — FAMOTIDINE 20 MG: 20 TABLET, FILM COATED ORAL at 09:01

## 2024-01-10 RX ADMIN — INSULIN ASPART 4 UNITS: 100 INJECTION, SOLUTION INTRAVENOUS; SUBCUTANEOUS at 12:01

## 2024-01-10 RX ADMIN — TRAMADOL HYDROCHLORIDE 50 MG: 50 TABLET, COATED ORAL at 11:01

## 2024-01-10 RX ADMIN — SENNOSIDES AND DOCUSATE SODIUM 2 TABLET: 8.6; 5 TABLET ORAL at 09:01

## 2024-01-10 RX ADMIN — INSULIN ASPART 4 UNITS: 100 INJECTION, SOLUTION INTRAVENOUS; SUBCUTANEOUS at 08:01

## 2024-01-10 RX ADMIN — GABAPENTIN 125 MG: 250 SOLUTION ORAL at 09:01

## 2024-01-10 RX ADMIN — ACETAMINOPHEN 1000 MG: 500 TABLET ORAL at 04:01

## 2024-01-10 RX ADMIN — TRAZODONE HYDROCHLORIDE 50 MG: 50 TABLET ORAL at 08:01

## 2024-01-10 RX ADMIN — PIPERACILLIN SODIUM AND TAZOBACTAM SODIUM 4.5 G: 4; .5 INJECTION, POWDER, FOR SOLUTION INTRAVENOUS at 02:01

## 2024-01-10 RX ADMIN — INSULIN ASPART 6 UNITS: 100 INJECTION, SOLUTION INTRAVENOUS; SUBCUTANEOUS at 09:01

## 2024-01-10 RX ADMIN — INSULIN ASPART 4 UNITS: 100 INJECTION, SOLUTION INTRAVENOUS; SUBCUTANEOUS at 03:01

## 2024-01-10 RX ADMIN — AMIODARONE HYDROCHLORIDE 400 MG: 200 TABLET ORAL at 09:01

## 2024-01-10 RX ADMIN — PIPERACILLIN SODIUM AND TAZOBACTAM SODIUM 4.5 G: 4; .5 INJECTION, POWDER, FOR SOLUTION INTRAVENOUS at 01:01

## 2024-01-10 RX ADMIN — SODIUM CHLORIDE: 9 INJECTION, SOLUTION INTRAVENOUS at 08:01

## 2024-01-10 RX ADMIN — PSYLLIUM HUSK 1 PACKET: 3.4 POWDER ORAL at 09:01

## 2024-01-10 RX ADMIN — Medication: at 09:01

## 2024-01-10 RX ADMIN — ACETAMINOPHEN 1000 MG: 500 TABLET ORAL at 09:01

## 2024-01-10 RX ADMIN — INSULIN ASPART 6 UNITS: 100 INJECTION, SOLUTION INTRAVENOUS; SUBCUTANEOUS at 04:01

## 2024-01-10 RX ADMIN — Medication 2000 UNITS: at 09:01

## 2024-01-10 RX ADMIN — ALTEPLASE 2 MG: 2.2 INJECTION, POWDER, LYOPHILIZED, FOR SOLUTION INTRAVENOUS at 02:01

## 2024-01-10 RX ADMIN — VENLAFAXINE 37.5 MG: 37.5 TABLET ORAL at 09:01

## 2024-01-10 RX ADMIN — Medication: at 08:01

## 2024-01-10 RX ADMIN — ACETAMINOPHEN 1000 MG: 500 TABLET ORAL at 08:01

## 2024-01-10 NOTE — PLAN OF CARE
VSS, LVAD # WNL. TF at goal rate, BG monitored, insulin given per order. Ext cath in place, I&O monitored. Pt turned in bed  Pt has 1U RBC ordered. Per HTS, will transfuse during HD tmrw  Cathflo to PICC purple lumen ~0254. Dayshift RN aware    Problem: Fluid and Electrolyte Imbalance (Acute Kidney Injury/Impairment)  Goal: Fluid and Electrolyte Balance  1/10/2024 0147 by Ellis Joseph, RN  Outcome: Ongoing, Progressing     Problem: Infection  Goal: Absence of Infection Signs and Symptoms  Outcome: Ongoing, Progressing     Problem: Adjustment to Illness (Heart Failure)  Goal: Optimal Coping  Outcome: Ongoing, Progressing     Problem: Adjustment to Device (Ventricular Assist Device)  Goal: Optimal Adjustment to Device  Outcome: Ongoing, Progressing

## 2024-01-10 NOTE — PLAN OF CARE
Problem: Adult Inpatient Plan of Care  Goal: Plan of Care Review  Outcome: Ongoing, Progressing  Flowsheets (Taken 1/10/2024 1524)  Plan of Care Reviewed With: patient  Goal: Patient-Specific Goal (Individualized)  Outcome: Ongoing, Progressing  Goal: Absence of Hospital-Acquired Illness or Injury  Outcome: Ongoing, Progressing  Intervention: Identify and Manage Fall Risk  Flowsheets (Taken 1/10/2024 1524)  Safety Promotion/Fall Prevention: assistive device/personal item within reach  Intervention: Prevent Skin Injury  Flowsheets (Taken 1/10/2024 1524)  Body Position: position changed independently  Intervention: Prevent and Manage VTE (Venous Thromboembolism) Risk  Flowsheets (Taken 1/10/2024 1524)  Range of Motion: ROM (range of motion) performed  Intervention: Prevent Infection  Flowsheets (Taken 1/10/2024 1524)  Infection Prevention: cohorting utilized  Goal: Optimal Comfort and Wellbeing  Outcome: Ongoing, Progressing  Intervention: Monitor Pain and Promote Comfort  Flowsheets (Taken 1/10/2024 1524)  Pain Management Interventions: quiet environment facilitated  Intervention: Provide Person-Centered Care  Flowsheets (Taken 1/10/2024 1524)  Trust Relationship/Rapport:   care explained   choices provided  Goal: Readiness for Transition of Care  Outcome: Ongoing, Progressing

## 2024-01-10 NOTE — PROGRESS NOTES
01/10/24 0815 01/10/24 0818        Hemodialysis Catheter 12/22/23 1024 right internal jugular   Placement Date/Time: 12/22/23 1024   Hand Hygiene: Performed  Barrier Precautions: Performed  Skin Antisepsis: ChloraPrep  Hemodialysis Catheter Type: Tunneled catheter  Location: right internal jugular  Catheter Size (Fr): 14.5 Fr  : Service Route...   Site Assessment No drainage  --    Dressing Type CHG impregnated dressing/sponge  --    Dressing Status Clean;Dry;Intact  --    Date on Dressing 01/09/24  --    Dressing Due to be Changed 01/16/24  --    Venous Patency/Care blood return present;flushed w/o difficulty  --    Arterial Patency/Care blood return present;flushed w/o difficulty  --    During Hemodialysis Assessment   Blood Flow Rate (mL/min)  --  400 mL/min   Dialysate Flow Rate (mL/min)  --  700 ml/min   Ultrafiltration Rate (mL/Hr)  --  580 mL/Hr   Arteriovenous Lines Secure  --  Yes   Arterial Pressure (mmHg)  --  -140 mmHg   Venous Pressure (mmHg)  --  100   Blood Volume Processed (Liters)  --  0 L   UF Removed (mL)  --  0 mL   TMP  --  20   Venous Line in Air Detector  --  Yes   Intake (mL)  --  250 mL   Transducer Dry  --  Yes   Access Visible  --  Yes    notified of access issue?  --  N/A   Heparin given?  --  N/A   Intra-Hemodialysis Comments  --  HD started     BS HD started via right subclavian CVC.

## 2024-01-10 NOTE — PROGRESS NOTES
[facility] - Cardiology Stepdown  Heart Transplant  Progress Note    Patient Name: Radha Abbott  MRN: [id number]  Admission Date: 11/9/2023  Hospital Length of Stay: 62 days  Attending Physician: Sandhya Price MD  Primary Care Provider: Vasu Kong MD  Principal Problem:LVAD (left ventricular assist device) present    Subjective:   Interval History: NAEON. No acute complaints. Received 1 unit pRBC with HD this morning. INR elevated, no source of bleeding. Will start Boost supplementation.     Continuous Infusions:   sodium chloride 0.9%      dextrose 10 % in water (D10W)       Scheduled Meds:   acetaminophen  1,000 mg Per NG tube TID    alteplase  2 mg Intra-Catheter Once    amiodarone  400 mg Per NG tube Daily    balsam peru-castor oiL   Topical (Top) BID    cholecalciferol (vitamin D3)  2,000 Units Per NG tube Daily    epoetin zohaib (PROCRIT) injection  50 Units/kg Subcutaneous Every Tues, Thurs, Sat    famotidine  20 mg Per G Tube Daily    gabapentin  125 mg Per NG tube Q12H    insulin aspart U-100  4 Units Subcutaneous Q24H    insulin aspart U-100  4 Units Subcutaneous Q24H    insulin aspart U-100  4 Units Subcutaneous Q24H    insulin aspart U-100  6 Units Subcutaneous Q24H    insulin aspart U-100  6 Units Subcutaneous Q24H    insulin aspart U-100  6 Units Subcutaneous Q24H    oseltamivir  30 mg Oral Every Tues, Thurs, Sat    piperacillin-tazobactam (Zosyn) IV (PEDS and ADULTS) (extended infusion is not appropriate)  4.5 g Intravenous Q12H    polyethylene glycol  17 g Per NG tube Daily    psyllium husk (aspartame)  1 packet Per NG tube Daily    senna-docusate 8.6-50 mg  2 tablet Per NG tube Daily    traZODone  50 mg Per NG tube QHS    venlafaxine  37.5 mg Per G Tube Daily     PRN Meds:0.9%  NaCl infusion (for blood administration), bisacodyL, dextrose 10 % in water (D10W), dextrose 10%, dextrose 10%, dextrose 10%, dextrose 10%, glucagon (human recombinant), heparin (porcine), hydrALAZINE, insulin aspart  U-100, ondansetron, Flushing PICC/Midline Protocol **AND** [DISCONTINUED] sodium chloride 0.9% **AND** sodium chloride 0.9%, traMADoL, zolpidem    Review of patient's allergies indicates:  No Known Allergies  Objective:     Vital Signs (Most Recent):  Temp: 97.7 °F (36.5 °C) (01/10/24 1102)  Pulse: 96 (01/10/24 1109)  Resp: 18 (01/10/24 1100)  BP: (!) 89/54 (01/10/24 1049)  SpO2: 100 % (01/10/24 0756) Vital Signs (24h Range):  Temp:  [96.8 °F (36 °C)-98 °F (36.7 °C)] 97.7 °F (36.5 °C)  Pulse:  [] 96  Resp:  [17-18] 18  SpO2:  [96 %-100 %] 100 %  BP: (70-98)/(0-72) 89/54     Patient Vitals for the past 72 hrs (Last 3 readings):   Weight   01/10/24 0756 69.9 kg (154 lb)   01/09/24 0743 68.9 kg (152 lb)   01/08/24 1401 68 kg (150 lb)       Body mass index is 20.89 kg/m².      Intake/Output Summary (Last 24 hours) at 1/10/2024 1200  Last data filed at 1/10/2024 1049  Gross per 24 hour   Intake 2120 ml   Output 2075 ml   Net 45 ml         Hemodynamic Parameters:  CVP:  [12 mmHg] 12 mmHg    Telemetry: SR       Physical Exam  Constitutional:       Comments: Cachectic, temporal wasting   HENT:      Head: Normocephalic and atraumatic.   Eyes:      Conjunctiva/sclera: Conjunctivae normal.      Pupils: Pupils are equal, round, and reactive to light.   Neck:      Comments: Do not appreciate elevated JVP  Cardiovascular:      Rate and Rhythm: Normal rate and regular rhythm.      Comments: Smooth VAD hum  Pulmonary:      Effort: Pulmonary effort is normal.      Breath sounds: Normal breath sounds.   Abdominal:      General: Bowel sounds are normal.      Palpations: Abdomen is soft.   Musculoskeletal:         General: No swelling. Normal range of motion.      Cervical back: Normal range of motion and neck supple.   Skin:     General: Skin is warm and dry.      Capillary Refill: Capillary refill takes 2 to 3 seconds.   Neurological:      General: No focal deficit present.      Mental Status: He is alert and oriented to  person, place, and time.            Significant Labs:  CBC:  Recent Labs   Lab 01/08/24  0819 01/09/24  0717 01/10/24  0338   WBC 9.71 8.28 8.29   RBC 2.38* 2.31* 2.29*   HGB 6.7* 6.5* 6.4*   HCT 21.1* 21.2* 21.0*   * 439 452*   MCV 89 92 92   MCH 28.2 28.1 27.9   MCHC 31.8* 30.7* 30.5*       BNP:  Recent Labs   Lab 01/05/24  1059 01/08/24  0433 01/10/24  0338   * 652* 683*       CMP:  Recent Labs   Lab 01/08/24  0615 01/08/24  0621 01/09/24  0454 01/10/24  0337     --  146* 129*   CALCIUM 8.9  --  8.6* 8.8   ALBUMIN  --  2.0* 1.9* 1.9*   PROT  --  7.2 6.9 7.0   *  --  132* 133*   K 3.8  --  3.8 3.5   CO2 19*  --  21* 21*   CL 98  --  99 98   BUN 96*  --  103* 107*   CREATININE 5.5*  --  5.8* 6.0*   ALKPHOS  --  183* 156* 164*   ALT  --  31 26 26   AST  --  38 32 29   BILITOT  --  0.3 0.3 0.3        Coagulation:   Recent Labs   Lab 01/08/24  0614 01/09/24  0840 01/10/24  0337   INR 3.3* 3.6* 3.8*       LDH:  Recent Labs   Lab 01/09/24  0454 01/10/24  0337   * 330*       Microbiology:  Microbiology Results (last 7 days)       Procedure Component Value Units Date/Time    Fungus culture [[id number]] Collected: 12/06/23 1532    Order Status: Completed Specimen: Body Fluid from Lung, Right Updated: 01/09/24 1154     Fungus (Mycology) Culture No fungus isolated after 4 weeks    Narrative:      Bronchial Wash    Throat culture [[id number]]  (Abnormal)  (Susceptibility) Collected: 01/04/24 1942    Order Status: Completed Specimen: Throat Updated: 01/08/24 1348     RESPIRATORY CULTURE - THROAT PSEUDOMONAS AERUGINOSA   Many  Normal respiratory bobby also present      Influenza A & B by Molecular [[id number]] Collected: 01/05/24 1607    Order Status: Completed Specimen: Nasopharyngeal Swab Updated: 01/05/24 1647     Influenza A, Molecular Negative     Influenza B, Molecular Negative     Flu A & B Source NP    Group A Strep, Molecular [[id number]] Collected: 01/05/24 1004    Order Status:  Completed Specimen: Throat Updated: 01/05/24 1039     Group A Strep, Molecular Negative     Comment: Arcanobacterium haemolyticum and Beta Streptococcus group C   and G will not be detected by this test method.  Please order   Throat Culture ([id number]) if suspected.         Respiratory Infection Panel (PCR), Nasopharyngeal [[id number]] Collected: 01/04/24 1942    Order Status: Completed Specimen: Nasopharyngeal Swab Updated: 01/04/24 2101     Respiratory Infection Panel Source NP Swab     Adenovirus Not Detected     Coronavirus 229E, Common Cold Virus Not Detected     Coronavirus HKU1, Common Cold Virus Not Detected     Coronavirus NL63, Common Cold Virus Not Detected     Coronavirus OC43, Common Cold Virus Not Detected     Comment: The Coronavirus strains detected in this test cause the common cold.  These strains are not the COVID-19 (novel Coronavirus)strain   associated with the respiratory disease outbreak.          SARS-CoV2 (COVID-19) Qualitative PCR Not Detected     Human Metapneumovirus Not Detected     Human Rhinovirus/Enterovirus Not Detected     Influenza A (subtypes H1, H1-2009,H3) Not Detected     Influenza B Not Detected     Parainfluenza Virus 1 Not Detected     Parainfluenza Virus 2 Not Detected     Parainfluenza Virus 3 Not Detected     Parainfluenza Virus 4 Not Detected     Respiratory Syncytial Virus Not Detected     Bordetella Parapertussis (NK2350) Not Detected     Bordetella pertussis (ptxP) Not Detected     Chlamydia pneumoniae Not Detected     Mycoplasma pneumoniae Not Detected    Narrative:      For all other respiratory sources, order [id number] -  Respiratory Viral Panel by PCR            I have reviewed all pertinent labs within the past 24 hours.    Estimated Creatinine Clearance: 12.8 mL/min (A) (based on SCr of 6 mg/dL (H)).    Diagnostic Results:  I have reviewed and interpreted all pertinent imaging results/findings within the past 24 hours.  Assessment and Plan:     63 year old male  with hx of CAD s/p 3v CABG (unclear anatomy, 2009), ICM with a recent EF of 15-20% s/p ICD (medtronchristiano 2009), DM2 (a1c 7.7), HTN, HLD, Vfib on amio who presents to the ED with CC of SOB     Pt was recently admitted to [facility] [place], [place] as a transfer.  He was started on a Lasix gtt and did well.  Started on gDMT and discharged home on  5 with plans to follow up in [facility] clinic for ongoing transplant evaluation at another facility.  He says that about a few days ago he started to notice LE swelling.  This turned into Nelson and orthopnea.  He says he can't walk to the bathroom without getting SOB.  He also complains of weight gain.  He takes torsemide 20mg BID at home and was told to trial additional Lasix which he did without any improvement.  He was rx metolazone but has not been filled.  He came to the ED     In the ED he was AF with stable VS on RA.  CBC showing chronic anemia.  CMP notable for acute on chronic CKD with baseline around 2.1 and Cr now 2.6.  BNP elevated.  Lactate neg.  CXR showing pulmonary edema.  I evaluated the pt at the bedside.  Bedside TTE showing CVP >15.  He was subsequently admitted to the CCU for diuresis.     He was continued on his home  and was started on a lasix gtt, which he responded well to overnight (net -1700cc. He feels much better this morning as well. Our transplant coordinators have been working on insurance approval and he is now being transferred to [facility] service for transplant evaluation.     * LVAD (left ventricular assist device) present  -HeartMate 3 Implanted  12/1/2023  as DT  -[facility]  -Implanted by Dr. Washington  -Hold Coumadin, dosing by PharmD.  Goal INR 2.0-3.0 . Supratherapeutic today.   -Antiplatelets Not on ASA  -LDH is stable overall today. Will continue to monitor daily.  -Weaned off midodrine.  -Speed set at  4900   rpm, LSL 4500 rpm   -Interrogation notable for no events  -ECHO 1/2/24 mild TR, LVEDD 6.25 with HM3 set to 4900 rpm   -Not listed for OHTx, declined for OHTX  due to comorbidities   -PT/OT/Speech. Recommending rehab but HD + LVAD may be barrier      Procedure: Device Interrogation Including analysis of device parameters  Current Settings: Ventricular Assist Device  Review of device function is stable/unstable stable        1/10/2024     9:25 AM 1/10/2024     3:33 AM 1/9/2024    11:57 PM 1/9/2024     8:32 PM 1/9/2024     3:52 PM 1/9/2024    11:29 AM 1/9/2024     7:58 AM   TXP LVAD INTERROGATIONS   Type HeartMate3 HeartMate3 HeartMate3 HeartMate3 HeartMate3 HeartMate3 HeartMate3   Flow 4 3.9 3.9 3.9 4 4 3.9   Speed 4900 4900 4900 4900 4900 4900 4900   PI 4.1 4 4.1 5 3.8 5.4 4.5   Power ([place]) 3.3 3.3 3.2 3.3 3.3 3.3 3.3   LSL 4500   4500 4500 4500 4500   Low Flow Alarm     no no no   High Power Alarm     no no no   Pulsatility    Intermittent pulse Intermittent pulse Intermittent pulse Intermittent pulse         Right parotid mass  - 2.2cm R. Parotid mass noted.   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 Can follow up as outpatient w/ ENT for FNA. No further interventions needed as inpatient.     Lymphadenopathy  +ill contacts  Respiratory panel negative  Strep test negative.   Throat culture showing pseudomonas. As per ID , will treat w/ 5 day course of zosyn    Unilateral complete vocal fold paralysis  -Appreciate ENT's help  -S/P augmentation of paralyzed left vocal cord 12/18    Dysphonia  -Speech following closely  -Appreciate ENT's help. S/P augmentation of paralyzed left vocal cord 12/18    Moderate malnutrition  -RD and SLP following  -Tolerating tube feeds. Total daily caloric intake increased to 2160  -Failed MBBS 12/20, continue TF.  -Speech following  -Adding boost for supratherapeutic INR     Depressed mood  -Psychiatry consulted for depressed mood, SI when left alone  -Recommend sitter when alone (possibly with transition to stepdown).  -Psych reconsulted 1/2, agree with venlafaxine and trazadone    IVÁN (acute kidney injury)  -Nephrology  following      Acute on chronic combined systolic and diastolic CHF, NYHA class 4  Pt with known ICM with an EF of 25% on home  presented with decompensated HF.    -S/P LVAD    PAF (paroxysmal atrial fibrillation)  Known hx of pAF. In sinus rhythm on admission, but 1 run of AF RVR overnight. He spontaneously converted.  - Continue home amiodarone 400mg qd  - Continue AC, on hold with supratherapeutic INR       Acute renal failure with acute tubular necrosis superimposed on stage 3b chronic kidney disease  IVÁN on CKD2. Baseline Cr of 1.7-2.0.   - Hold ARNi  -Trend BMPs daily   -Nephrology following   -SLED/SCUF for volume removal began on 12/1. He is anuric  -Did not respond to diuretic challenge with 240 mg IV Lasix, 1000 mg Diuril, and 500 mg IV Diamox done 12/14  -Diuretic challenge 1/8 with 160 mg IVP Lasix, total  cc/24 hours   -Now on HD TThSat  -Tunneled trialysis line placed 12/22 by IR    Type 2 diabetes mellitus without complication, without long-term current use of insulin  -A1c 7.6, not insulin dependent  - Endocrine following, appreciate assistance with blood glucose management    CAD (coronary artery disease)  -S/p 3vCABG in 2009  -Lipitor on hold 2/2 mild transaminitis  -Not on ASA post VAD    Ventricular tachycardia  Hx VT s/p ICD placement (medtronic 2009)  - Continue home amio 400mg qd        Vero Lozada PA-C  Heart Transplant  [facility] - Cardiology Stepdown

## 2024-01-10 NOTE — PROGRESS NOTES
[facility] - [facility]iology Stepdown  Nephrology  Progress Note    Patient Name: Radha Abbott  MRN: [id number]  Admission Date: 11/9/2023  Hospital Length of Stay: 62 days  Attending Provider: Sandhya Price MD   Primary Care Physician: Vasu Kong MD  Principal Problem:LVAD (left ventricular assist device) present    Subjective:     Interval History: Unable to dialyze yesterday due to high patient census. Seen on HD today, tolerating well. Received 1 unit PRBCS.     Review of patient's allergies indicates:  No Known Allergies  Current Facility-Administered Medications   Medication Frequency    0.9%  NaCl infusion (for blood administration) Q24H PRN    0.9%  NaCl infusion Continuous    0.9%  NaCl infusion Once    acetaminophen tablet 1,000 mg TID    alteplase injection 2 mg Once    amiodarone tablet 400 mg Daily    balsam peru-castor oiL Oint BID    bisacodyL suppository 10 mg Daily PRN    cholecalciferol (vitamin D3) 400 units/mL oral liquid Daily    dextrose 10 % infusion Continuous PRN    dextrose 10% bolus 125 mL 125 mL PRN    dextrose 10% bolus 125 mL 125 mL PRN    dextrose 10% bolus 250 mL 250 mL PRN    dextrose 10% bolus 250 mL 250 mL PRN    epoetin zohaib injection 8,100 Units Every Tues, Thurs, Sat    famotidine tablet 20 mg Daily    gabapentin 250 mg/5 mL (5 mL) solution 125 mg Q12H    glucagon (human recombinant) injection 1 mg PRN    heparin (porcine) injection 1,000 Units PRN    hydrALAZINE injection 10 mg Q6H PRN    insulin aspart U-100 pen 0-5 Units Q4H PRN    insulin aspart U-100 pen 4 Units Q24H    insulin aspart U-100 pen 4 Units Q24H    insulin aspart U-100 pen 4 Units Q24H    insulin aspart U-100 pen 6 Units Q24H    insulin aspart U-100 pen 6 Units Q24H    insulin aspart U-100 pen 6 Units Q24H    ondansetron injection 4 mg Q6H PRN    oseltamivir capsule 30 mg Every Tues, Thurs, Sat    piperacillin-tazobactam (ZOSYN) 4.5 g in dextrose 5 % in water (D5W) 100 mL IVPB (MB+) Q12H    polyethylene  glycol packet 17 g Daily    psyllium husk (aspartame) 3.4 gram packet 1 packet Daily    senna-docusate 8.6-50 mg per tablet 2 tablet Daily    sodium chloride 0.9% flush 10 mL PRN    traMADoL tablet 50 mg Q8H PRN    traZODone tablet 50 mg QHS    venlafaxine tablet 37.5 mg Daily    zolpidem tablet 5 mg Nightly PRN       Objective:     Vital Signs (Most Recent):  Temp: 97.7 °F (36.5 °C) (01/10/24 1102)  Pulse: 86 (01/10/24 1049)  Resp: 18 (01/10/24 1100)  BP: (!) 89/54 (01/10/24 1049)  SpO2: 100 % (01/10/24 0756) Vital Signs (24h Range):  Temp:  [96.8 °F (36 °C)-98 °F (36.7 °C)] 97.7 °F (36.5 °C)  Pulse:  [] 86  Resp:  [] 18  SpO2:  [96 %-100 %] 100 %  BP: (70-98)/(0-72) 89/54     Weight: 69.9 kg (154 lb) (01/10/24 0756)  Body mass index is 20.89 kg/m².  Body surface area is 1.88 meters squared.    I/O last 3 completed shifts:  In: 2138 [P.O.:358; Other:120; NG/GT:1660]  Out: 1225 [Urine:1225]     Physical Exam  Vitals and nursing note reviewed.   Constitutional:       Comments: Cachectic, temporal wasting   HENT:      Head: Normocephalic and atraumatic.   Eyes:      Conjunctiva/sclera: Conjunctivae normal.   Neck:      Comments: Do not appreciate elevated JVP  Cardiovascular:      Comments: LVAD  Pulmonary:      Effort: Pulmonary effort is normal.   Abdominal:      Palpations: Abdomen is soft.   Musculoskeletal:         General: No swelling. Normal range of motion.      Cervical back: Normal range of motion and neck supple.      Right lower leg: No edema.      Left lower leg: No edema.   Skin:     General: Skin is warm.   Neurological:      Mental Status: He is alert and oriented to person, place, and time.          Significant Labs:  CBC:   Recent Labs   Lab 01/10/24  0338   WBC 8.29   RBC 2.29*   HGB 6.4*   HCT 21.0*   *   MCV 92   MCH 27.9   MCHC 30.5*     CMP:   Recent Labs   Lab 01/10/24  0337   *   CALCIUM 8.8   ALBUMIN 1.9*   PROT 7.0   *   K 3.5   CO2 21*   CL 98   *    CREATININE 6.0*   ALKPHOS 164*   ALT 26   AST 29   BILITOT 0.3     All labs within the past 24 hours have been reviewed.       Assessment/Plan:     Cardiac/Vascular  * LVAD (left ventricular assist device) present  -management per primary     Renal/  Acute renal failure with acute tubular necrosis superimposed on stage 3b chronic kidney disease  - suspect acute tubular injury secondary to hypotension/cardiogenic shock likely compounded by intra-arterial contrast and anemia with urinary sediment with granular casts    -Seen on HD, tolerating well 0.5 UF goal   -Will eval iHD needs daily   - Continue EPO with HD  -Continue bladder scans q shift  -strict I/O's  -renal diet/tube feeds when not NPO, volume restriction per primary team  -renally all dose medications to eGFR  -avoid nephrotoxic agents wean feasible (i.e. NSAIDs, intra-arterial contrast, supra-therapeutic vancomycin levels, etc.)          Thank you for your consult. I will follow-up with patient. Please contact us if you have any additional questions.    Yolanda Cai DNP  Nephrology  [facility] - Cardiology Stepdown

## 2024-01-10 NOTE — NURSING
PICC removal to left upper arm using air embolism precautions. Removed intact at 36cm. Occlusive pressure dsg applied and secured with large tegaderm. Pt tolerated well.

## 2024-01-10 NOTE — ASSESSMENT & PLAN NOTE
Lab Results   Component Value Date    CREATININE 6.0 (H) 01/10/2024     Avoid insulin stacking  Titrate insulin slowly

## 2024-01-10 NOTE — ASSESSMENT & PLAN NOTE
IVÁN on CKD2. Baseline Cr of 1.7-2.0.   - Hold ARNi  -Trend BMPs daily   -Nephrology following   -SLED/SCUF for volume removal began on 12/1. He is anuric  -Did not respond to diuretic challenge with 240 mg IV Lasix, 1000 mg Diuril, and 500 mg IV Diamox done 12/14  -Diuretic challenge 1/8 with 160 mg IVP Lasix, total  cc/24 hours   -Now on HD TThSat  -Tunneled trialysis line placed 12/22 by IR

## 2024-01-10 NOTE — ASSESSMENT & PLAN NOTE
-HeartMate 3 Implanted  12/1/2023  as DT  -[facility]  -Implanted by Dr. Washington  -Hold Coumadin, dosing by PharmD.  Goal INR 2.0-3.0 . Supratherapeutic today.   -Antiplatelets Not on ASA  -LDH is stable overall today. Will continue to monitor daily.  -Weaned off midodrine.  -Speed set at  4900   rpm, LSL 4500 rpm   -Interrogation notable for no events  -ECHO 1/2/24 mild TR, LVEDD 6.25 with HM3 set to 4900 rpm   -Not listed for OHTx, declined for OHTX due to comorbidities   -PT/OT/Speech. Recommending rehab but HD + LVAD may be barrier      Procedure: Device Interrogation Including analysis of device parameters  Current Settings: Ventricular Assist Device  Review of device function is stable/unstable stable        1/10/2024     9:25 AM 1/10/2024     3:33 AM 1/9/2024    11:57 PM 1/9/2024     8:32 PM 1/9/2024     3:52 PM 1/9/2024    11:29 AM 1/9/2024     7:58 AM   TXP LVAD INTERROGATIONS   Type HeartMate3 HeartMate3 HeartMate3 HeartMate3 HeartMate3 HeartMate3 HeartMate3   Flow 4 3.9 3.9 3.9 4 4 3.9   Speed 4900 4900 4900 4900 4900 4900 4900   PI 4.1 4 4.1 5 3.8 5.4 4.5   Power ([place]) 3.3 3.3 3.2 3.3 3.3 3.3 3.3   LSL 4500   4500 4500 4500 4500   Low Flow Alarm     no no no   High Power Alarm     no no no   Pulsatility    Intermittent pulse Intermittent pulse Intermittent pulse Intermittent pulse

## 2024-01-10 NOTE — NURSING
Normal Saline IV gtt and epoetin zohaib injection to be given with dialysis tonight. Oseltamivir to be given after dialysis session. Medications not given at this time for that reason.

## 2024-01-10 NOTE — PT/OT/SLP PROGRESS
Occupational Therapy   Treatment    Name: Radha Abbott  MRN: [id number]  Admitting Diagnosis:  LVAD (left ventricular assist device) present  37 Days Post-Op    Recommendations:     Discharge Recommendations: High Intensity Therapy  Discharge Equipment Recommendations:  hospital bed, wheelchair (B platform rolling walker)  Barriers to discharge:  None    Assessment:     Radha Abbott is a 63 y.o. male with a medical diagnosis of LVAD (left ventricular assist device) present.  He presents with the following performance deficits affecting function are weakness, impaired endurance, impaired self care skills, impaired functional mobility, gait instability, impaired balance, decreased coordination, decreased upper extremity function, decreased lower extremity function, decreased safety awareness, pain, impaired coordination, impaired fine motor, impaired cardiopulmonary response to activity, orthopedic precautions. Pt was able to switch from wall to battery power with physical assist as well as verbal cues for sequencing. He continues to require increased assistance with ADLs and functional transfers/mobility. He is at risk for falls. Pt would continue to benefit from skilled OT services to maximize functional independence with ADLs and functional mobility, reduce caregiver burden, and facilitate safe discharge in the least restrictive environment. Patient has demonstrated sufficient progression to warrant high intensity therapy evidenced by objectives noted below.     A wheelchair is recommended due to the identified mobility limitation(s) that significantly impair this patient's ability to participate in one or more mobility related activities of daily living in customary locations in the home. These mobility limitations cannot be sufficiently resolved with the use of a cane or walker.  The use of a manual wheelchair will significantly improve this patient's ability to participate in mobility related activities of daily  living and the patient has expressed willingness to use the manual wheelchair in the home.      A hospital bed is recommended because the patient requires positioning of the body in ways that are not feasible using an ordinary bed, pillows, and/or wedges. Due to the patient's limitations in bed mobility, they are not able to independently make changes in body positions without the use of the bed to assist.      Rehab Prognosis:  Good; patient would benefit from acute skilled OT services to address these deficits and reach maximum level of function.       Plan:     Patient to be seen 5 x/week to address the above listed problems via self-care/home management, therapeutic activities, therapeutic exercises, neuromuscular re-education  Plan of Care Expires: 02/03/24  Plan of Care Reviewed with: patient, spouse    Subjective     Chief Complaint: weakness  Patient/Family Comments/goals: to get to wheelchair  Pain/Comfort:  Pain Rating 1: 0/10  Pain Rating Post-Intervention 1: 0/10    Objective:     Communicated with: RN prior to session.  Patient found HOB elevated with LVAD, NG tube, telemetry upon OT entry to room.    General Precautions: Standard, fall, LVAD, sternal, aspiration    Orthopedic Precautions:N/A  Braces: N/A  Respiratory Status: Room air     Occupational Performance:     Bed Mobility:    Patient completed Scooting/Bridging with maximal assistance  Patient completed Supine to Sit with maximal assistance     Functional Mobility/Transfers:   Patient completed Bed <> Chair Transfer using Squat Pivot technique with maximal assistance with no assistive device    Activities of Daily Living:  Lower Body Dressing: maximal assistance required to don b/l shoes EOB      AMPAC 6 Click ADL: 12    Treatment & Education:  Education on VAD management including LVAD sternal precautions; proper anchor placement and driveline securement for DLES integrity; purpose of Self Test; identifying parts including controller,  driveline, power cords, controller pouch, batteries, clips, consolidation bag, battery charger; pairing and rotating batteries; checking battery power; contents of emergency bag and need for having emergency bag when out of room/out of house; retrieving VAD numbers from controller; recording numbers in binder; transitioning to/from battery and wall power   -Education on energy conservation and task modification to maximize safety and (I) during ADLs and mobility  -Education on importance of OOB activity to improve overall activity tolerance and promote recovery  -Pt educated to call for assistance and to transfer with hospital staff only  -Provided education regarding role of OT, POC, & discharge recommendations with pt and wife verbalizing understanding.  Pt had no further questions & when asked whether there were any concerns pt reported none.     Patient left up in chair with all lines intact, call button in reach, and RN and wife present    GOALS:   Multidisciplinary Problems       Occupational Therapy Goals          Problem: Occupational Therapy    Goal Priority Disciplines Outcome Interventions   Occupational Therapy Goal     OT, PT/OT Ongoing, Progressing    Description: Goals to be met by: 2/3/24    Patient will increase functional independence with ADLs by performing:    UB Dressing with SBA  LE Dressing with Supervision.  Grooming while standing at sink with Supervision.  Toileting from bedside commode with SBA for hygiene and clothing management.   Step transfer to bedside commode with SBA  [place] with VAD management during ADLs                             Time Tracking:     OT Date of Treatment: 01/10/24  OT Start Time: 1134  OT Stop Time: 1158  OT Total Time (min): 24 min    Billable Minutes:Self Care/Home Management 10  Therapeutic Activity 14    OT/PRIETO: OT     Number of PRIETO visits since last OT visit: 0    1/10/2024

## 2024-01-10 NOTE — PT/OT/SLP PROGRESS
Physical Therapy Treatment    Patient Name:  Radha Abbott   MRN:  [id number]  Admitting Diagnosis:  LVAD (left ventricular assist device) present   Recent Surgery: Procedure(s) (LRB):  CLOSURE, WOUND, STERNUM (N/A)  INSERTION, GRAFT, PERICARDIUM  DRAINAGE, PLEURAL EFFUSION 37 Days Post-Op  Admit Date: 11/9/2023  Length of Stay: 62 days    Recommendations:     Discharge Recommendations:    High Intensity Therapy   Discharge Equipment Recommendations: wheelchair, hospital bed, other (see comments) (bilateral platform rolling walker)   Barriers to discharge: Decreased caregiver support and increased need for caregiver assistance    Plan:     During this hospitalization, patient to be seen 6 x/week to address the identified rehab impairments via gait training, therapeutic activities, therapeutic exercises, neuromuscular re-education and progress towards the established goals.  Plan of Care Expires:  02/05/24  Plan of Care Reviewed with: patient, spouse    Assessment:     Radha Abbott is a 63 y.o. male admitted with a medical diagnosis of LVAD (left ventricular assist device) present. Pt agreeable to therapy session. Pt session focused on coordination with OT/nsg to assist pt back from w/c for sunshine therapy. Upon return from sunshine therapy with nsg, pt reporting fatigue with not enough energy for gait trial this session. PT assisted pt with t/f back to bed. Patient limited by decreased endurance, global weakness, deconditioning and decreased activity tolerance resulting in increased need for assistance with mobility. Pt not functioning at his PLOF and is a fall risk. Patient would benefit from skilled therapy services to maximize safety and independence, increase activity tolerance, decrease fall risk, decrease caregiver burden, improve QOL, improve patient's functional mobility, and decrease risk of contractures and pressure sores. Patient has demonstrated sufficient progression to warrant high intensity therapy  "evidenced by objectives noted below.     Problem List: weakness, impaired endurance, impaired self care skills, impaired functional mobility, gait instability, impaired balance, decreased coordination, decreased upper extremity function, decreased lower extremity function, orthopedic precautions, impaired cardiopulmonary response to activity, decreased safety awareness.  Rehab Prognosis: Good; patient would benefit from acute skilled PT services to address these deficits and reach maximum level of function.      Goals:   Multidisciplinary Problems       Physical Therapy Goals          Problem: Physical Therapy    Goal Priority Disciplines Outcome Goal Variances Interventions   Physical Therapy Goal     PT, PT/OT Ongoing, Progressing     Description: Goals to be met by: 23     Patient will increase functional independence with mobility by performin. Sit to stand transfer with modified independence  2. Bed to chair transfer with supervision  3. Gait  x 150 feet with supervision using physician approved AD with standing rest breaks prn.   4. Lower extremity exercise program x30 reps per handout, with independence  5. Recite 3/3 sternal precautions and remain complaint with precautions throughout session with no verbal cues                     Multidisciplinary Problems (Resolved)          Problem: Physical Therapy    Goal Priority Disciplines Outcome Goal Variances Interventions   Physical Therapy Goal   (Resolved)     PT, PT/OT Met     Description: Goals to be met by: 23     Patient will increase functional independence with mobility by performin. Evaluate pt's need for physical therapy.                          Subjective     RN notified prior to session. Wife and RN present upon PT entrance into room. Patient agreeable to PT treatment session.    Chief Complaint: "I want to get back to bed"  Patient/Family Comments/goals: increase strength and mobility   Pain/Comfort:  Pain Rating 1: " 0/10  Pain Rating Post-Intervention 1: 0/10      Objective:     Additional staff present: RN    Patient found  sitting in w/c  with: telemetry, LVAD, PICC line, NG tube   Cognition:   Alert, Cooperative, and Flat affect  Patient is oriented to Person, Place, Time, Situation  General Precautions: Standard, Cardiac fall, LVAD, sternal, aspiration, honey thick   Orthopedic Precautions:N/A   Braces: N/A   Body mass index is 20.89 kg/m².  Oxygen Device: Room Air  Vitals: BP (!) 89/54 (BP Location: Left arm, Patient Position: Lying)   Pulse 96   Temp 97.7 °F (36.5 °C) (Axillary)   Resp 18   Ht 6' (1.829 m)   Wt 69.9 kg (154 lb)   SpO2 100%   BMI 20.89 kg/m²     Outcome Measures:  AM-PAC 6 CLICK MOBILITY  Turning over in bed (including adjusting bedclothes, sheets and blankets)?: 3  Sitting down on and standing up from a chair with arms (e.g., wheelchair, bedside commode, etc.): 2  Moving from lying on back to sitting on the side of the bed?: 3  Moving to and from a bed to a chair (including a wheelchair)?: 2  Need to walk in hospital room?: 3  Climbing 3-5 steps with a railing?: 1  Basic Mobility Total Score: 14     Functional Mobility:    Bed Mobility:   Rolling to left with contact guard assistance  Scooting with maximal assistance and 2 persons  Sit > Supine with maximal assistance    Transfers:   Sit <> Stand Transfer: Not performed 2/2 pt fatigue   Bed <> Chair Transfer: Squat Pivot technique with maximal assistance with no assistive device   X2 pivot required to reach surface    Balance:  Sitting Balance  Static: contact guard assistance  Dynamic: minimum assistance      Gait:   Not performed 2/2 pt fatigue    Therapeutic Exercises:   Patient educated on bed level therex to perform including heel slides and ankle pumps    Education:  Time provided for education, counseling and discussion of health disposition in regards to patient's current status  All questions answered within PT scope of practice and to  patient's satisfaction  PT role in POC to address current functional deficits  Pt educated on proper body mechanics, safety techniques, and energy conservation with PT facilitation and cueing throughout session  Call nursing/pct to transfer to chair/use bathroom. Pt stated understanding.  LVAD: patient found on battery power / returned on wall power. No alarms sounded.  (PT completed Total switch from battery<>wall power.     Patient left up in chair with all lines intact, call button in reach, RN notified, and wife present.    Time Tracking:     PT Received On: 01/10/24  PT Start Time: 1219     PT Stop Time: 1230  PT Total Time (min): 11 min     Billable Minutes:   Therapeutic Activity 11 minutes    Treatment Type: Treatment  PT/PTA: PT       1/10/2024

## 2024-01-10 NOTE — PROGRESS NOTES
Update:  CRISTAL spoke with pt's wife (Arlyn). Arlyn advised that she is back at pt's bedside and needs assistance with some type of letter for her work since FMLA forms are not ready.  CRISTAL advised SW will send letter to pt's work on her behalf tomorrow.  No other needs reported at this time. CRISTAL remains available.

## 2024-01-10 NOTE — PROGRESS NOTES
01/10/24 0600   Invasive Hemodynamic Monitoring   CVP (mmHg) 12 mmHg     PICC lumens very difficult to flush. HTS aware

## 2024-01-10 NOTE — PROGRESS NOTES
01/10/24 1049        Hemodialysis Catheter 12/22/23 1024 right internal jugular   Placement Date/Time: 12/22/23 1024   Hand Hygiene: Performed  Barrier Precautions: Performed  Skin Antisepsis: ChloraPrep  Hemodialysis Catheter Type: Tunneled catheter  Location: right internal jugular  Catheter Size (Fr): 14.5 Fr  : [place]...   Dressing Status Clean;Dry;Intact   Venous Patency/Care flushed w/o difficulty;normal saline locked   Arterial Patency/Care flushed w/o difficulty;normal saline locked   During Hemodialysis Assessment   Blood Flow Rate (mL/min) 400 mL/min   Dialysate Flow Rate (mL/min) 700 ml/min   Ultrafiltration Rate (mL/Hr) 580 mL/Hr   Arteriovenous Lines Secure Yes   Arterial Pressure (mmHg) -150 mmHg   Venous Pressure (mmHg) 100   UF Removed (mL) 1450 mL   TMP 20   Venous Line in Air Detector Yes   Transducer Dry Yes   Access Visible Yes   Intra-Hemodialysis Comments HD completed   Post-Hemodialysis Assessment   Rinseback Volume (mL) 250 mL   Blood Volume Processed (Liters) 60 L   Dialyzer Clearance Moderately streaked   Duration of Treatment 150 minutes   Total UF (mL) 1450 mL   Net Fluid Removal 500   Patient Response to Treatment Tolerated well     Patient left in room NAD.

## 2024-01-10 NOTE — SUBJECTIVE & OBJECTIVE
Interval History: Unable to dialyze yesterday due to high patient census. Seen on HD today, tolerating well. Received 1 unit PRBCS.     Review of patient's allergies indicates:  No Known Allergies  Current Facility-Administered Medications   Medication Frequency    0.9%  NaCl infusion (for blood administration) Q24H PRN    0.9%  NaCl infusion Continuous    0.9%  NaCl infusion Once    acetaminophen tablet 1,000 mg TID    alteplase injection 2 mg Once    amiodarone tablet 400 mg Daily    balsam peru-castor oiL Oint BID    bisacodyL suppository 10 mg Daily PRN    cholecalciferol (vitamin D3) 400 units/mL oral liquid Daily    dextrose 10 % infusion Continuous PRN    dextrose 10% bolus 125 mL 125 mL PRN    dextrose 10% bolus 125 mL 125 mL PRN    dextrose 10% bolus 250 mL 250 mL PRN    dextrose 10% bolus 250 mL 250 mL PRN    epoetin zohaib injection 8,100 Units Every Tues, Thurs, Sat    famotidine tablet 20 mg Daily    gabapentin 250 mg/5 mL (5 mL) solution 125 mg Q12H    glucagon (human recombinant) injection 1 mg PRN    heparin (porcine) injection 1,000 Units PRN    hydrALAZINE injection 10 mg Q6H PRN    insulin aspart U-100 pen 0-5 Units Q4H PRN    insulin aspart U-100 pen 4 Units Q24H    insulin aspart U-100 pen 4 Units Q24H    insulin aspart U-100 pen 4 Units Q24H    insulin aspart U-100 pen 6 Units Q24H    insulin aspart U-100 pen 6 Units Q24H    insulin aspart U-100 pen 6 Units Q24H    ondansetron injection 4 mg Q6H PRN    oseltamivir capsule 30 mg Every Tues, Thurs, Sat    piperacillin-tazobactam (ZOSYN) 4.5 g in dextrose 5 % in water (D5W) 100 mL IVPB (MB+) Q12H    polyethylene glycol packet 17 g Daily    psyllium husk (aspartame) 3.4 gram packet 1 packet Daily    senna-docusate 8.6-50 mg per tablet 2 tablet Daily    sodium chloride 0.9% flush 10 mL PRN    traMADoL tablet 50 mg Q8H PRN    traZODone tablet 50 mg QHS    venlafaxine tablet 37.5 mg Daily    zolpidem tablet 5 mg Nightly PRN       Objective:     Vital  Signs (Most Recent):  Temp: 97.7 °F (36.5 °C) (01/10/24 1102)  Pulse: 86 (01/10/24 1049)  Resp: 18 (01/10/24 1100)  BP: (!) 89/54 (01/10/24 1049)  SpO2: 100 % (01/10/24 0756) Vital Signs (24h Range):  Temp:  [96.8 °F (36 °C)-98 °F (36.7 °C)] 97.7 °F (36.5 °C)  Pulse:  [] 86  Resp:  [] 18  SpO2:  [96 %-100 %] 100 %  BP: (70-98)/(0-72) 89/54     Weight: 69.9 kg (154 lb) (01/10/24 0756)  Body mass index is 20.89 kg/m².  Body surface area is 1.88 meters squared.    I/O last 3 completed shifts:  In: 2138 [P.O.:358; Other:120; NG/GT:1660]  Out: 1225 [Urine:1225]     Physical Exam  Vitals and nursing note reviewed.   Constitutional:       Comments: Cachectic, temporal wasting   HENT:      Head: Normocephalic and atraumatic.   Eyes:      Conjunctiva/sclera: Conjunctivae normal.   Neck:      Comments: Do not appreciate elevated JVP  Cardiovascular:      Comments: LVAD  Pulmonary:      Effort: Pulmonary effort is normal.   Abdominal:      Palpations: Abdomen is soft.   Musculoskeletal:         General: No swelling. Normal range of motion.      Cervical back: Normal range of motion and neck supple.      Right lower leg: No edema.      Left lower leg: No edema.   Skin:     General: Skin is warm.   Neurological:      Mental Status: He is alert and oriented to person, place, and time.          Significant Labs:  CBC:   Recent Labs   Lab 01/10/24  0338   WBC 8.29   RBC 2.29*   HGB 6.4*   HCT 21.0*   *   MCV 92   MCH 27.9   MCHC 30.5*     CMP:   Recent Labs   Lab 01/10/24  0337   *   CALCIUM 8.8   ALBUMIN 1.9*   PROT 7.0   *   K 3.5   CO2 21*   CL 98   *   CREATININE 6.0*   ALKPHOS 164*   ALT 26   AST 29   BILITOT 0.3     All labs within the past 24 hours have been reviewed.

## 2024-01-10 NOTE — PROGRESS NOTES
01/10/2024  Deni Frye    Current provider:  Sandhya Price MD    Device interrogation:      1/10/2024     9:25 AM 1/10/2024     3:33 AM 1/9/2024    11:57 PM 1/9/2024     8:32 PM 1/9/2024     3:52 PM 1/9/2024    11:29 AM 1/9/2024     7:58 AM   TXP LVAD INTERROGATIONS   Type HeartMate3 HeartMate3 HeartMate3 HeartMate3 HeartMate3 HeartMate3 HeartMate3   Flow 4 3.9 3.9 3.9 4 4 3.9   Speed 4900 4900 4900 4900 4900 4900 4900   PI 4.1 4 4.1 5 3.8 5.4 4.5   Power ([place]) 3.3 3.3 3.2 3.3 3.3 3.3 3.3   LSL 4500   4500 4500 4500 4500   Low Flow Alarm     no no no   High Power Alarm     no no no   Pulsatility    Intermittent pulse Intermittent pulse Intermittent pulse Intermittent pulse          Rounded on [facility] to ensure all mechanical assist device settings (IABP or VAD) were appropriate and all parameters were within limits.  I was able to ensure all back up equipment was present, the staff had no issues, and the Perfusion Department daily rounding was complete.      For implantable VADs: Interrogation of Ventricular assist device was performed with analysis of device parameters and review of device function. I have personally reviewed the interrogation findings and agree with findings as stated.     In emergency, the nursing units have been notified to contact the perfusion department either by:  Calling [phone] from 630am to 4pm Mon thru Fri, utilizing the On-Call Finder functionality of Epic and searching for Perfusion, or by contacting the hospital  from 4pm to 630am and on weekends and asking to speak with the perfusionist on call.    2:54 PM

## 2024-01-10 NOTE — SUBJECTIVE & OBJECTIVE
"Interval HPI:   Overnight events: No acute events overnight. Patient in room 312/312 A. Blood glucose stable. BG at goal on current insulin regimen (Schedule Insulin and SSI (TPN/TF)). Steroid use- None. 37 Days Post-Op  Renal function- Abnormal - Creatinine 6.0   Vasopressors-  None       Endocrine will continue to follow and manage insulin orders inpatient.         Diet Minced & Moist (IDDSI Level 5) Honey Thick (Moderately Thick)     Eating:   <25%  Nausea: No  Hypoglycemia and intervention: No  Fever: No  TPN and/or TF: Yes  If yes, type of TF/TPN and rate: TF @ 45 ml/hr    BP (!) 78/0 (BP Location: Left arm, Patient Position: Lying)   Pulse 81   Temp 97.7 °F (36.5 °C)   Resp 18   Ht 6' (1.829 m)   Wt 69.9 kg (154 lb)   SpO2 100%   BMI 20.89 kg/m²     Labs Reviewed and Include    Recent Labs   Lab 01/10/24  0337   *   CALCIUM 8.8   ALBUMIN 1.9*   PROT 7.0   *   K 3.5   CO2 21*   CL 98   *   CREATININE 6.0*   ALKPHOS 164*   ALT 26   AST 29   BILITOT 0.3     Lab Results   Component Value Date    WBC 8.29 01/10/2024    HGB 6.4 (L) 01/10/2024    HCT 21.0 (L) 01/10/2024    MCV 92 01/10/2024     (H) 01/10/2024     No results for input(s): "TSH", "FREET4" in the last 168 hours.  Lab Results   Component Value Date    HGBA1C 7.6 (H) 10/12/2023       Nutritional status:   Body mass index is 20.89 kg/m².  Lab Results   Component Value Date    ALBUMIN 1.9 (L) 01/10/2024    ALBUMIN 1.9 (L) 01/09/2024    ALBUMIN 2.0 (L) 01/08/2024     Lab Results   Component Value Date    PREALBUMIN 36 01/10/2024    PREALBUMIN 26 01/08/2024    PREALBUMIN 35 01/05/2024       Estimated Creatinine Clearance: 12.8 mL/min (A) (based on SCr of 6 mg/dL (H)).    Accu-Checks  Recent Labs     01/08/24  1636 01/08/24  2125 01/09/24  0006 01/09/24  0452 01/09/24  0802 01/09/24  1131 01/09/24  1643 01/09/24  2037 01/10/24  0004 01/10/24  0336   POCTGLUCOSE 144* 136* 112* 160* 203* 194* 175* 131* 133* 135*       Current " Medications and/or Treatments Impacting Glycemic Control  Immunotherapy:    Immunosuppressants       None          Steroids:   Hormones (From admission, onward)      None          Pressors:    Autonomic Drugs (From admission, onward)      None          Hyperglycemia/Diabetes Medications:   Antihyperglycemics (From admission, onward)      Start     Stop Route Frequency Ordered    01/05/24 0800  insulin aspart U-100 pen 6 Units         -- SubQ Every 24 hours (non-standard times) 01/04/24 1214    01/05/24 0400  insulin aspart U-100 pen 4 Units         -- SubQ Every 24 hours (non-standard times) 01/04/24 1214    01/05/24 0000  insulin aspart U-100 pen 4 Units         -- SubQ Every 24 hours (non-standard times) 01/04/24 1214    01/04/24 2000  insulin aspart U-100 pen 4 Units         -- SubQ Every 24 hours (non-standard times) 01/04/24 1214    01/04/24 1600  insulin aspart U-100 pen 6 Units         -- SubQ Every 24 hours (non-standard times) 01/04/24 1214    01/04/24 1213  insulin aspart U-100 pen 6 Units         -- SubQ Every 24 hours (non-standard times) 01/04/24 1214    12/31/23 1108  insulin aspart U-100 pen 0-5 Units         -- SubQ Every 4 hours PRN 12/31/23 1008

## 2024-01-10 NOTE — ASSESSMENT & PLAN NOTE
-RD and SLP following  -Tolerating tube feeds. Total daily caloric intake increased to 2160  -Failed MBBS 12/20, continue TF.  -Speech following  -Adding boost for supratherapeutic INR

## 2024-01-10 NOTE — SUBJECTIVE & OBJECTIVE
Interval History: NAEON. No acute complaints. Received 1 unit pRBC with HD this morning. INR elevated, no source of bleeding. Will start Boost supplementation.     Continuous Infusions:   sodium chloride 0.9%      dextrose 10 % in water (D10W)       Scheduled Meds:   acetaminophen  1,000 mg Per NG tube TID    alteplase  2 mg Intra-Catheter Once    amiodarone  400 mg Per NG tube Daily    balsam peru-castor oiL   Topical (Top) BID    cholecalciferol (vitamin D3)  2,000 Units Per NG tube Daily    epoetin zohaib (PROCRIT) injection  50 Units/kg Subcutaneous Every Tues, Thurs, Sat    famotidine  20 mg Per G Tube Daily    gabapentin  125 mg Per NG tube Q12H    insulin aspart U-100  4 Units Subcutaneous Q24H    insulin aspart U-100  4 Units Subcutaneous Q24H    insulin aspart U-100  4 Units Subcutaneous Q24H    insulin aspart U-100  6 Units Subcutaneous Q24H    insulin aspart U-100  6 Units Subcutaneous Q24H    insulin aspart U-100  6 Units Subcutaneous Q24H    oseltamivir  30 mg Oral Every Tues, Thurs, Sat    piperacillin-tazobactam (Zosyn) IV (PEDS and ADULTS) (extended infusion is not appropriate)  4.5 g Intravenous Q12H    polyethylene glycol  17 g Per NG tube Daily    psyllium husk (aspartame)  1 packet Per NG tube Daily    senna-docusate 8.6-50 mg  2 tablet Per NG tube Daily    traZODone  50 mg Per NG tube QHS    venlafaxine  37.5 mg Per G Tube Daily     PRN Meds:0.9%  NaCl infusion (for blood administration), bisacodyL, dextrose 10 % in water (D10W), dextrose 10%, dextrose 10%, dextrose 10%, dextrose 10%, glucagon (human recombinant), heparin (porcine), hydrALAZINE, insulin aspart U-100, ondansetron, Flushing PICC/Midline Protocol **AND** [DISCONTINUED] sodium chloride 0.9% **AND** sodium chloride 0.9%, traMADoL, zolpidem    Review of patient's allergies indicates:  No Known Allergies  Objective:     Vital Signs (Most Recent):  Temp: 97.7 °F (36.5 °C) (01/10/24 1102)  Pulse: 96 (01/10/24 1109)  Resp: 18 (01/10/24  1100)  BP: (!) 89/54 (01/10/24 1049)  SpO2: 100 % (01/10/24 0756) Vital Signs (24h Range):  Temp:  [96.8 °F (36 °C)-98 °F (36.7 °C)] 97.7 °F (36.5 °C)  Pulse:  [] 96  Resp:  [17-18] 18  SpO2:  [96 %-100 %] 100 %  BP: (70-98)/(0-72) 89/54     Patient Vitals for the past 72 hrs (Last 3 readings):   Weight   01/10/24 0756 69.9 kg (154 lb)   01/09/24 0743 68.9 kg (152 lb)   01/08/24 1401 68 kg (150 lb)       Body mass index is 20.89 kg/m².      Intake/Output Summary (Last 24 hours) at 1/10/2024 1200  Last data filed at 1/10/2024 1049  Gross per 24 hour   Intake 2120 ml   Output 2075 ml   Net 45 ml         Hemodynamic Parameters:  CVP:  [12 mmHg] 12 mmHg    Telemetry: SR       Physical Exam  Constitutional:       Comments: Cachectic, temporal wasting   HENT:      Head: Normocephalic and atraumatic.   Eyes:      Conjunctiva/sclera: Conjunctivae normal.      Pupils: Pupils are equal, round, and reactive to light.   Neck:      Comments: Do not appreciate elevated JVP  Cardiovascular:      Rate and Rhythm: Normal rate and regular rhythm.      Comments: Smooth VAD hum  Pulmonary:      Effort: Pulmonary effort is normal.      Breath sounds: Normal breath sounds.   Abdominal:      General: Bowel sounds are normal.      Palpations: Abdomen is soft.   Musculoskeletal:         General: No swelling. Normal range of motion.      Cervical back: Normal range of motion and neck supple.   Skin:     General: Skin is warm and dry.      Capillary Refill: Capillary refill takes 2 to 3 seconds.   Neurological:      General: No focal deficit present.      Mental Status: He is alert and oriented to person, place, and time.            Significant Labs:  CBC:  Recent Labs   Lab 01/08/24  0819 01/09/24  0717 01/10/24  0338   WBC 9.71 8.28 8.29   RBC 2.38* 2.31* 2.29*   HGB 6.7* 6.5* 6.4*   HCT 21.1* 21.2* 21.0*   * 439 452*   MCV 89 92 92   MCH 28.2 28.1 27.9   MCHC 31.8* 30.7* 30.5*       BNP:  Recent Labs   Lab 01/05/24  3984  01/08/24  0433 01/10/24  0338   * 652* 683*       CMP:  Recent Labs   Lab 01/08/24  0615 01/08/24  0621 01/09/24  0454 01/10/24  0337     --  146* 129*   CALCIUM 8.9  --  8.6* 8.8   ALBUMIN  --  2.0* 1.9* 1.9*   PROT  --  7.2 6.9 7.0   *  --  132* 133*   K 3.8  --  3.8 3.5   CO2 19*  --  21* 21*   CL 98  --  99 98   BUN 96*  --  103* 107*   CREATININE 5.5*  --  5.8* 6.0*   ALKPHOS  --  183* 156* 164*   ALT  --  31 26 26   AST  --  38 32 29   BILITOT  --  0.3 0.3 0.3        Coagulation:   Recent Labs   Lab 01/08/24  0614 01/09/24  0840 01/10/24  0337   INR 3.3* 3.6* 3.8*       LDH:  Recent Labs   Lab 01/09/24  0454 01/10/24  0337   * 330*       Microbiology:  Microbiology Results (last 7 days)       Procedure Component Value Units Date/Time    Fungus culture [[id number]] Collected: 12/06/23 1532    Order Status: Completed Specimen: Body Fluid from Lung, Right Updated: 01/09/24 1154     Fungus (Mycology) Culture No fungus isolated after 4 weeks    Narrative:      Bronchial Wash    Throat culture [[id number]]  (Abnormal)  (Susceptibility) Collected: 01/04/24 1942    Order Status: Completed Specimen: Throat Updated: 01/08/24 1348     RESPIRATORY CULTURE - THROAT PSEUDOMONAS AERUGINOSA   Many  Normal respiratory bobby also present      Influenza A & B by Molecular [[id number]] Collected: 01/05/24 1607    Order Status: Completed Specimen: Nasopharyngeal Swab Updated: 01/05/24 1647     Influenza A, Molecular Negative     Influenza B, Molecular Negative     Flu A & B Source NP    Group A Strep, Molecular [[id number]] Collected: 01/05/24 1004    Order Status: Completed Specimen: Throat Updated: 01/05/24 1039     Group A Strep, Molecular Negative     Comment: Arcanobacterium haemolyticum and Beta Streptococcus group C   and G will not be detected by this test method.  Please order   Throat Culture ([id number]) if suspected.         Respiratory Infection Panel (PCR), Nasopharyngeal [[id number]]  Collected: 01/04/24 1942    Order Status: Completed Specimen: Nasopharyngeal Swab Updated: 01/04/24 2101     Respiratory Infection Panel Source NP Swab     Adenovirus Not Detected     Coronavirus 229E, Common Cold Virus Not Detected     Coronavirus HKU1, Common Cold Virus Not Detected     Coronavirus NL63, Common Cold Virus Not Detected     Coronavirus OC43, Common Cold Virus Not Detected     Comment: The Coronavirus strains detected in this test cause the common cold.  These strains are not the COVID-19 (novel Coronavirus)strain   associated with the respiratory disease outbreak.          SARS-CoV2 (COVID-19) Qualitative PCR Not Detected     Human Metapneumovirus Not Detected     Human Rhinovirus/Enterovirus Not Detected     Influenza A (subtypes H1, H1-2009,H3) Not Detected     Influenza B Not Detected     Parainfluenza Virus 1 Not Detected     Parainfluenza Virus 2 Not Detected     Parainfluenza Virus 3 Not Detected     Parainfluenza Virus 4 Not Detected     Respiratory Syncytial Virus Not Detected     Bordetella Parapertussis (YO8265) Not Detected     Bordetella pertussis (ptxP) Not Detected     Chlamydia pneumoniae Not Detected     Mycoplasma pneumoniae Not Detected    Narrative:      For all other respiratory sources, order [id number] -  Respiratory Viral Panel by PCR            I have reviewed all pertinent labs within the past 24 hours.    Estimated Creatinine Clearance: 12.8 mL/min (A) (based on SCr of 6 mg/dL (H)).    Diagnostic Results:  I have reviewed and interpreted all pertinent imaging results/findings within the past 24 hours.

## 2024-01-10 NOTE — ASSESSMENT & PLAN NOTE
- suspect acute tubular injury secondary to hypotension/cardiogenic shock likely compounded by intra-arterial contrast and anemia with urinary sediment with granular casts    -Seen on HD, tolerating well 0.5 UF goal   -Will eval iHD needs daily   - Continue EPO with HD  -Continue bladder scans q shift  -strict I/O's  -renal diet/tube feeds when not NPO, volume restriction per primary team  -renally all dose medications to eGFR  -avoid nephrotoxic agents wean feasible (i.e. NSAIDs, intra-arterial contrast, supra-therapeutic vancomycin levels, etc.)

## 2024-01-10 NOTE — NURSING
Rounded on patient. Not feeling the greatest today, tired/weak. Patient to receive blood with HD tomorrow. Will pick back up on VAD education tomorrow. Patient has been seen by Jorge A Todd, VAD .

## 2024-01-10 NOTE — PROGRESS NOTES
[facility] - [facility] Stepdown  Endocrinology  Progress Note    Admit Date: 11/9/2023     Reason for Consult: Management of T2DM, Hyperglycemia     Surgical Procedure and Date: n/a    Diabetes diagnosis year: 1998    Home Diabetes Medications:  Metformin (off since October)   Lab Results   Component Value Date    HGBA1C 7.6 (H) 10/12/2023       How often checking glucose at home?  Once daily in the AM    BG readings on regimen: 150-160s  Hypoglycemia on the regimen?  No  Missed doses on regimen?  n/a    Diabetes Complications include:     Hyperglycemia    Complicating diabetes co morbidities:   CAD s/p CABG, HTN, HLD      HPI:   Patient is a 63 y.o. male with a diagnosis of CAD s/p 3v CABG (unclear anatomy, 2009), ICM with a recent EF of 15-20% s/p ICD (medtronik 2009), DM2 (a1c 7.7), HTN, HLD, Vfib on amio who presents to the ED with CC of SOB. In the ED he was AF with stable VS on RA.  CBC showing chronic anemia.  CMP notable for acute on chronic CKD with baseline around 2.1 and Cr now 2.6.  BNP elevated.  Lactate neg.  CXR showing pulmonary edema. He was subsequently admitted to the CCU for diuresis.  Endocrinology consulted for management of T2DM.          Interval HPI:   Overnight events: No acute events overnight. Patient in room 312/312 A. Blood glucose stable. BG at goal on current insulin regimen (Schedule Insulin and SSI (TPN/TF)). Steroid use- None. 37 Days Post-Op  Renal function- Abnormal - Creatinine 6.0   Vasopressors-  None       Endocrine will continue to follow and manage insulin orders inpatient.         Diet Minced & Moist (IDDSI Level 5) Honey Thick (Moderately Thick)     Eating:   <25%  Nausea: No  Hypoglycemia and intervention: No  Fever: No  TPN and/or TF: Yes  If yes, type of TF/TPN and rate: TF @ 45 ml/hr    BP (!) 78/0 (BP Location: Left arm, Patient Position: Lying)   Pulse 81   Temp 97.7 °F (36.5 °C)   Resp 18   Ht 6' (1.829 m)   Wt 69.9 kg (154 lb)   SpO2 100%   BMI 20.89 kg/m²  "    Labs Reviewed and Include    Recent Labs   Lab 01/10/24  0337   *   CALCIUM 8.8   ALBUMIN 1.9*   PROT 7.0   *   K 3.5   CO2 21*   CL 98   *   CREATININE 6.0*   ALKPHOS 164*   ALT 26   AST 29   BILITOT 0.3     Lab Results   Component Value Date    WBC 8.29 01/10/2024    HGB 6.4 (L) 01/10/2024    HCT 21.0 (L) 01/10/2024    MCV 92 01/10/2024     (H) 01/10/2024     No results for input(s): "TSH", "FREET4" in the last 168 hours.  Lab Results   Component Value Date    HGBA1C 7.6 (H) 10/12/2023       Nutritional status:   Body mass index is 20.89 kg/m².  Lab Results   Component Value Date    ALBUMIN 1.9 (L) 01/10/2024    ALBUMIN 1.9 (L) 01/09/2024    ALBUMIN 2.0 (L) 01/08/2024     Lab Results   Component Value Date    PREALBUMIN 36 01/10/2024    PREALBUMIN 26 01/08/2024    PREALBUMIN 35 01/05/2024       Estimated Creatinine Clearance: 12.8 mL/min (A) (based on SCr of 6 mg/dL (H)).    Accu-Checks  Recent Labs     01/08/24  1636 01/08/24  2125 01/09/24  0006 01/09/24  0452 01/09/24  0802 01/09/24  1131 01/09/24  1643 01/09/24  2037 01/10/24  0004 01/10/24  0336   POCTGLUCOSE 144* 136* 112* 160* 203* 194* 175* 131* 133* 135*       Current Medications and/or Treatments Impacting Glycemic Control  Immunotherapy:    Immunosuppressants       None          Steroids:   Hormones (From admission, onward)      None          Pressors:    Autonomic Drugs (From admission, onward)      None          Hyperglycemia/Diabetes Medications:   Antihyperglycemics (From admission, onward)      Start     Stop Route Frequency Ordered    01/05/24 0800  insulin aspart U-100 pen 6 Units         -- SubQ Every 24 hours (non-standard times) 01/04/24 1214    01/05/24 0400  insulin aspart U-100 pen 4 Units         -- SubQ Every 24 hours (non-standard times) 01/04/24 1214    01/05/24 0000  insulin aspart U-100 pen 4 Units         -- SubQ Every 24 hours (non-standard times) 01/04/24 1214 01/04/24 2000  insulin aspart U-100 " pen 4 Units         -- SubQ Every 24 hours (non-standard times) 01/04/24 1214    01/04/24 1600  insulin aspart U-100 pen 6 Units         -- SubQ Every 24 hours (non-standard times) 01/04/24 1214    01/04/24 1213  insulin aspart U-100 pen 6 Units         -- SubQ Every 24 hours (non-standard times) 01/04/24 1214    12/31/23 1108  insulin aspart U-100 pen 0-5 Units         -- SubQ Every 4 hours PRN 12/31/23 1008            ASSESSMENT and PLAN    Cardiac/Vascular  * LVAD (left ventricular assist device) present  Optimize BG control to improve wound healing        PAF (paroxysmal atrial fibrillation)  May increase insulin resistance.         Acute on chronic combined systolic and diastolic CHF, NYHA class 4  Managed per primary team  Optimize BG control  S/p LVAD     Renal/  Acute renal failure with acute tubular necrosis superimposed on stage 3b chronic kidney disease  Lab Results   Component Value Date    CREATININE 6.0 (H) 01/10/2024     Avoid insulin stacking  Titrate insulin slowly       Endocrine  Type 2 diabetes mellitus without complication, without long-term current use of insulin  BG goal 140-180. TF @ 45 ml/hr.     - Novolog 6 units during 0800, 1200, and 1600 BG checks to cover additional intake. -hold if TF held or BG < 100   - Novolog 4 units 2000, 0000, 0400 to cover TF- hold if TF held or BG < 100   - LDC (150/50) prn q4hr   - POCT Glucose every 4 hours  - Hypoglycemia protocol in place      ** Please notify Endocrine for any change and/or advance in diet**  ** Please call Endocrine for any BG related issues **     Discharge Planning:   TBD. Please notify endocrinology prior to discharge.                  Erasmo Parrish, DNP, FNP  Endocrinology  [facility] - Cardiology Step[facility]   Imaging Studies

## 2024-01-11 LAB
ALBUMIN SERPL BCP-MCNC: 2 G/DL (ref 3.5–5.2)
ALP SERPL-CCNC: 160 U/L (ref 55–135)
ALT SERPL W/O P-5'-P-CCNC: 26 U/L (ref 10–44)
ANION GAP SERPL CALC-SCNC: 12 MMOL/L (ref 8–16)
ANISOCYTOSIS BLD QL SMEAR: SLIGHT
AST SERPL-CCNC: 30 U/L (ref 10–40)
BASOPHILS # BLD AUTO: 0.04 K/UL (ref 0–0.2)
BASOPHILS NFR BLD: 0.4 % (ref 0–1.9)
BILIRUB DIRECT SERPL-MCNC: 0.1 MG/DL (ref 0.1–0.3)
BILIRUB SERPL-MCNC: 0.3 MG/DL (ref 0.1–1)
BUN SERPL-MCNC: 60 MG/DL (ref 8–23)
CA-I BLDV-SCNC: 1.13 MMOL/L (ref 1.06–1.42)
CALCIUM SERPL-MCNC: 8.5 MG/DL (ref 8.7–10.5)
CHLORIDE SERPL-SCNC: 99 MMOL/L (ref 95–110)
CO2 SERPL-SCNC: 25 MMOL/L (ref 23–29)
CREAT SERPL-MCNC: 3.4 MG/DL (ref 0.5–1.4)
DIFFERENTIAL METHOD BLD: ABNORMAL
EOSINOPHIL # BLD AUTO: 0.3 K/UL (ref 0–0.5)
EOSINOPHIL NFR BLD: 3.1 % (ref 0–8)
ERYTHROCYTE [DISTWIDTH] IN BLOOD BY AUTOMATED COUNT: 17.2 % (ref 11.5–14.5)
EST. GFR  (NO RACE VARIABLE): 19.7 ML/MIN/1.73 M^2
GLUCOSE SERPL-MCNC: 113 MG/DL (ref 70–110)
HCT VFR BLD AUTO: 25.7 % (ref 40–54)
HGB BLD-MCNC: 7.8 G/DL (ref 14–18)
HYPOCHROMIA BLD QL SMEAR: ABNORMAL
IMM GRANULOCYTES # BLD AUTO: 0.07 K/UL (ref 0–0.04)
IMM GRANULOCYTES NFR BLD AUTO: 0.8 % (ref 0–0.5)
INR PPP: 2.4 (ref 0.8–1.2)
LDH SERPL L TO P-CCNC: 329 U/L (ref 110–260)
LYMPHOCYTES # BLD AUTO: 0.8 K/UL (ref 1–4.8)
LYMPHOCYTES NFR BLD: 8.6 % (ref 18–48)
MAGNESIUM SERPL-MCNC: 2.1 MG/DL (ref 1.6–2.6)
MCH RBC QN AUTO: 28.3 PG (ref 27–31)
MCHC RBC AUTO-ENTMCNC: 30.4 G/DL (ref 32–36)
MCV RBC AUTO: 93 FL (ref 82–98)
MONOCYTES # BLD AUTO: 0.7 K/UL (ref 0.3–1)
MONOCYTES NFR BLD: 8.3 % (ref 4–15)
NEUTROPHILS # BLD AUTO: 7.1 K/UL (ref 1.8–7.7)
NEUTROPHILS NFR BLD: 78.8 % (ref 38–73)
NRBC BLD-RTO: 0 /100 WBC
PHOSPHATE SERPL-MCNC: 2.4 MG/DL (ref 2.7–4.5)
PLATELET # BLD AUTO: 430 K/UL (ref 150–450)
PMV BLD AUTO: 9.1 FL (ref 9.2–12.9)
POCT GLUCOSE: 128 MG/DL (ref 70–110)
POCT GLUCOSE: 128 MG/DL (ref 70–110)
POCT GLUCOSE: 138 MG/DL (ref 70–110)
POCT GLUCOSE: 146 MG/DL (ref 70–110)
POCT GLUCOSE: 165 MG/DL (ref 70–110)
POCT GLUCOSE: 203 MG/DL (ref 70–110)
POCT GLUCOSE: 86 MG/DL (ref 70–110)
POLYCHROMASIA BLD QL SMEAR: ABNORMAL
POTASSIUM SERPL-SCNC: 3.4 MMOL/L (ref 3.5–5.1)
PROT SERPL-MCNC: 7.2 G/DL (ref 6–8.4)
PROTHROMBIN TIME: 24.3 SEC (ref 9–12.5)
RBC # BLD AUTO: 2.76 M/UL (ref 4.6–6.2)
SODIUM SERPL-SCNC: 136 MMOL/L (ref 136–145)
WBC # BLD AUTO: 8.96 K/UL (ref 3.9–12.7)

## 2024-01-11 PROCEDURE — 83735 ASSAY OF MAGNESIUM: CPT | Performed by: INTERNAL MEDICINE

## 2024-01-11 PROCEDURE — 85610 PROTHROMBIN TIME: CPT | Performed by: PHYSICIAN ASSISTANT

## 2024-01-11 PROCEDURE — 25000003 PHARM REV CODE 250

## 2024-01-11 PROCEDURE — 97530 THERAPEUTIC ACTIVITIES: CPT

## 2024-01-11 PROCEDURE — 27000248 HC VAD-ADDITIONAL DAY

## 2024-01-11 PROCEDURE — 36415 COLL VENOUS BLD VENIPUNCTURE: CPT | Performed by: PHYSICIAN ASSISTANT

## 2024-01-11 PROCEDURE — 25000003 PHARM REV CODE 250: Performed by: NURSE PRACTITIONER

## 2024-01-11 PROCEDURE — 80048 BASIC METABOLIC PNL TOTAL CA: CPT | Performed by: INTERNAL MEDICINE

## 2024-01-11 PROCEDURE — 25000003 PHARM REV CODE 250: Performed by: INTERNAL MEDICINE

## 2024-01-11 PROCEDURE — 97116 GAIT TRAINING THERAPY: CPT

## 2024-01-11 PROCEDURE — 84100 ASSAY OF PHOSPHORUS: CPT | Performed by: INTERNAL MEDICINE

## 2024-01-11 PROCEDURE — 99232 SBSQ HOSP IP/OBS MODERATE 35: CPT | Mod: ,,, | Performed by: NURSE PRACTITIONER

## 2024-01-11 PROCEDURE — 83615 LACTATE (LD) (LDH) ENZYME: CPT | Performed by: STUDENT IN AN ORGANIZED HEALTH CARE EDUCATION/TRAINING PROGRAM

## 2024-01-11 PROCEDURE — 27000207 HC ISOLATION

## 2024-01-11 PROCEDURE — 99233 SBSQ HOSP IP/OBS HIGH 50: CPT | Mod: ,,, | Performed by: INTERNAL MEDICINE

## 2024-01-11 PROCEDURE — 85025 COMPLETE CBC W/AUTO DIFF WBC: CPT | Performed by: PHYSICIAN ASSISTANT

## 2024-01-11 PROCEDURE — 25000003 PHARM REV CODE 250: Performed by: PHYSICIAN ASSISTANT

## 2024-01-11 PROCEDURE — 25000003 PHARM REV CODE 250: Performed by: STUDENT IN AN ORGANIZED HEALTH CARE EDUCATION/TRAINING PROGRAM

## 2024-01-11 PROCEDURE — 93750 INTERROGATION VAD IN PERSON: CPT | Mod: ,,, | Performed by: INTERNAL MEDICINE

## 2024-01-11 PROCEDURE — 82330 ASSAY OF CALCIUM: CPT | Performed by: INTERNAL MEDICINE

## 2024-01-11 PROCEDURE — 97535 SELF CARE MNGMENT TRAINING: CPT

## 2024-01-11 PROCEDURE — 20600001 HC STEP DOWN PRIVATE ROOM

## 2024-01-11 PROCEDURE — 80076 HEPATIC FUNCTION PANEL: CPT | Performed by: INTERNAL MEDICINE

## 2024-01-11 PROCEDURE — 92526 ORAL FUNCTION THERAPY: CPT

## 2024-01-11 PROCEDURE — 99233 SBSQ HOSP IP/OBS HIGH 50: CPT | Mod: ,,, | Performed by: PHYSICIAN ASSISTANT

## 2024-01-11 RX ORDER — POTASSIUM CHLORIDE 20 MEQ/1
20 TABLET, EXTENDED RELEASE ORAL ONCE
Status: COMPLETED | OUTPATIENT
Start: 2024-01-11 | End: 2024-01-11

## 2024-01-11 RX ORDER — WARFARIN 2.5 MG/1
2.5 TABLET ORAL
Status: DISCONTINUED | OUTPATIENT
Start: 2024-01-12 | End: 2024-01-12

## 2024-01-11 RX ORDER — MIRTAZAPINE 7.5 MG/1
7.5 TABLET, FILM COATED ORAL NIGHTLY
Status: DISCONTINUED | OUTPATIENT
Start: 2024-01-11 | End: 2024-01-22

## 2024-01-11 RX ORDER — WARFARIN SODIUM 5 MG/1
5 TABLET ORAL
Status: DISCONTINUED | OUTPATIENT
Start: 2024-01-11 | End: 2024-01-12

## 2024-01-11 RX ADMIN — INSULIN ASPART 4 UNITS: 100 INJECTION, SOLUTION INTRAVENOUS; SUBCUTANEOUS at 03:01

## 2024-01-11 RX ADMIN — INSULIN ASPART 6 UNITS: 100 INJECTION, SOLUTION INTRAVENOUS; SUBCUTANEOUS at 09:01

## 2024-01-11 RX ADMIN — INSULIN ASPART 6 UNITS: 100 INJECTION, SOLUTION INTRAVENOUS; SUBCUTANEOUS at 11:01

## 2024-01-11 RX ADMIN — ACETAMINOPHEN 1000 MG: 500 TABLET ORAL at 09:01

## 2024-01-11 RX ADMIN — SENNOSIDES AND DOCUSATE SODIUM 2 TABLET: 8.6; 5 TABLET ORAL at 09:01

## 2024-01-11 RX ADMIN — ACETAMINOPHEN 1000 MG: 500 TABLET ORAL at 08:01

## 2024-01-11 RX ADMIN — Medication: at 08:01

## 2024-01-11 RX ADMIN — WARFARIN SODIUM 5 MG: 5 TABLET ORAL at 05:01

## 2024-01-11 RX ADMIN — FAMOTIDINE 20 MG: 20 TABLET, FILM COATED ORAL at 09:01

## 2024-01-11 RX ADMIN — Medication 2000 UNITS: at 09:01

## 2024-01-11 RX ADMIN — INSULIN ASPART 1 UNITS: 100 INJECTION, SOLUTION INTRAVENOUS; SUBCUTANEOUS at 11:01

## 2024-01-11 RX ADMIN — POTASSIUM CHLORIDE 20 MEQ: 1500 TABLET, EXTENDED RELEASE ORAL at 11:01

## 2024-01-11 RX ADMIN — TRAZODONE HYDROCHLORIDE 50 MG: 50 TABLET ORAL at 08:01

## 2024-01-11 RX ADMIN — TRAMADOL HYDROCHLORIDE 50 MG: 50 TABLET, COATED ORAL at 04:01

## 2024-01-11 RX ADMIN — Medication: at 09:01

## 2024-01-11 RX ADMIN — INSULIN ASPART 4 UNITS: 100 INJECTION, SOLUTION INTRAVENOUS; SUBCUTANEOUS at 11:01

## 2024-01-11 RX ADMIN — VENLAFAXINE 37.5 MG: 37.5 TABLET ORAL at 09:01

## 2024-01-11 RX ADMIN — MIRTAZAPINE 7.5 MG: 7.5 TABLET, FILM COATED ORAL at 08:01

## 2024-01-11 RX ADMIN — OSELTAMIVIR PHOSPHATE 30 MG: 30 CAPSULE ORAL at 05:01

## 2024-01-11 RX ADMIN — GABAPENTIN 125 MG: 250 SOLUTION ORAL at 09:01

## 2024-01-11 RX ADMIN — INSULIN ASPART 4 UNITS: 100 INJECTION, SOLUTION INTRAVENOUS; SUBCUTANEOUS at 12:01

## 2024-01-11 RX ADMIN — AMIODARONE HYDROCHLORIDE 400 MG: 200 TABLET ORAL at 09:01

## 2024-01-11 RX ADMIN — INSULIN ASPART 6 UNITS: 100 INJECTION, SOLUTION INTRAVENOUS; SUBCUTANEOUS at 05:01

## 2024-01-11 NOTE — ASSESSMENT & PLAN NOTE
-HeartMate 3 Implanted  12/1/2023  as DT  -HTS Primary  -Implanted by Dr. Washington  -Hold Coumadin, dosing by PharmD.  Goal INR 2.0-3.0 . Supratherapeutic today.   -Antiplatelets Not on ASA  -LDH is stable overall today. Will continue to monitor daily.  -Weaned off midodrine.  -Speed set at  4900   rpm, LSL 4500 rpm   -Interrogation notable for no events  -ECHO 1/2/24 mild TR, LVEDD 6.25 with HM3 set to 4900 rpm   -Not listed for OHTx, declined for OHTX due to comorbidities   -PT/OT/Speech. Recommending rehab but HD + LVAD may be barrier      Procedure: Device Interrogation Including analysis of device parameters  Current Settings: Ventricular Assist Device  Review of device function is stable/unstable stable        1/11/2024    11:56 AM 1/11/2024     8:02 AM 1/11/2024     3:52 AM 1/10/2024    11:58 PM 1/10/2024     8:02 PM 1/10/2024     4:08 PM 1/10/2024     1:00 PM   TXP LVAD INTERROGATIONS   Type HeartMate3 HeartMate3 HeartMate3 HeartMate3 HeartMate3 HeartMate3 HeartMate3   Flow 4.2 3.8 3.9 4.1 4.2 3.9 4.1   Speed 4900 4900 4900 4900 4900 4900 4900   PI 3.7 4.9 4.6 4.1 3.3 5.1 4.9   Power ([place]) 3.4 3.3 3.3 3.3 3.4 3.3 3.2   LSL 4500 4500 4500 4500 4500 4500 4500   Pulsatility     Intermittent pulse Intermittent pulse Intermittent pulse

## 2024-01-11 NOTE — PROGRESS NOTES
"   01/11/24 1603        VAD 12/01/23 1015 Left ventricular assist device HeartMate 3   Placement Date/Time: 12/01/23 1015   Present Prior to Hospital Arrival?: No  VAD Type: Left ventricular assist device  VAD Brand: HeartMate 3   Site Location Abdomen right   Site Assessment Clean;Dry;Intact;Sutures intact  (per caregiver)   Driveline Exit Site 2  (per caregiver)   Driveline Assessment Free of Kinks;Intact;Modular cable Clean and Locked   Dressing Status Clean;Dry;Intact   Dressing Intervention Sterile dressing change   Performed By Caregiver   Dressing Change Schedule Every 3 days   Dressing Change Due 11/14/23   Integrity dry;intact;no damage   Driveline Clark in use [place] carranza     LVAD dressing change completed by caregiver using sterile technique with silver kit. DLES is a "2" with no drainage noted on the drain sponge. Tolerated without any complication. Sutures remain intact, no redness, or tenderness noted.   "

## 2024-01-11 NOTE — PROGRESS NOTES
[facility] - [facility]iology Stepdown  Nephrology  Progress Note    Patient Name: Radha Abbott  MRN: [id number]  Admission Date: 11/9/2023  Hospital Length of Stay: 63 days  Attending Provider: Sandhya Price MD   Primary Care Physician: Vasu Kong MD  Principal Problem:LVAD (left ventricular assist device) present    Subjective:     Interval History: HD yesterday, tolerated well. . Pt reports increase in UOP    Review of patient's allergies indicates:  No Known Allergies  Current Facility-Administered Medications   Medication Frequency    0.9%  NaCl infusion (for blood administration) Q24H PRN    0.9%  NaCl infusion Continuous    acetaminophen tablet 1,000 mg TID    alteplase injection 2 mg Once    amiodarone tablet 400 mg Daily    balsam peru-castor oiL Oint BID    bisacodyL suppository 10 mg Daily PRN    cholecalciferol (vitamin D3) 400 units/mL oral liquid Daily    dextrose 10 % infusion Continuous PRN    dextrose 10% bolus 125 mL 125 mL PRN    dextrose 10% bolus 125 mL 125 mL PRN    dextrose 10% bolus 250 mL 250 mL PRN    dextrose 10% bolus 250 mL 250 mL PRN    epoetin zohaib injection 8,100 Units Every Tues, Thurs, Sat    famotidine tablet 20 mg Daily    gabapentin 250 mg/5 mL (5 mL) solution 125 mg Q12H    glucagon (human recombinant) injection 1 mg PRN    heparin (porcine) injection 1,000 Units PRN    hydrALAZINE injection 10 mg Q6H PRN    insulin aspart U-100 pen 0-5 Units Q4H PRN    insulin aspart U-100 pen 4 Units Q24H    insulin aspart U-100 pen 4 Units Q24H    insulin aspart U-100 pen 4 Units Q24H    insulin aspart U-100 pen 6 Units Q24H    insulin aspart U-100 pen 6 Units Q24H    insulin aspart U-100 pen 6 Units Q24H    ondansetron injection 4 mg Q6H PRN    oseltamivir capsule 30 mg Every Tues, Thurs, Sat    polyethylene glycol packet 17 g Daily    potassium chloride SA CR tablet 20 mEq Once    psyllium husk (aspartame) 3.4 gram packet 1 packet Daily    senna-docusate 8.6-50 mg per tablet 2  tablet Daily    sodium chloride 0.9% flush 10 mL PRN    traMADoL tablet 50 mg Q8H PRN    traZODone tablet 50 mg QHS    venlafaxine tablet 37.5 mg Daily    [START ON 1/12/2024] warfarin (COUMADIN) tablet 2.5 mg Every Mon, Wed, Fri    warfarin (COUMADIN) tablet 5 mg Every Tues, Thurs, Sat, Sun    zolpidem tablet 5 mg Nightly PRN       Objective:     Vital Signs (Most Recent):  Temp: 98.1 °F (36.7 °C) (01/11/24 0749)  Pulse: 104 (01/11/24 1000)  Resp: 18 (01/11/24 0749)  BP: (!) 80/0 (01/11/24 0801)  SpO2: 100 % (01/11/24 0749) Vital Signs (24h Range):  Temp:  [97.7 °F (36.5 °C)-98.4 °F (36.9 °C)] 98.1 °F (36.7 °C)  Pulse:  [] 104  Resp:  [17-18] 18  SpO2:  [97 %-100 %] 100 %  BP: (74-89)/(0-54) 80/0     Weight: 69.9 kg (154 lb) (01/10/24 0756)  Body mass index is 20.89 kg/m².  Body surface area is 1.88 meters squared.    I/O last 3 completed shifts:  In: 2194 [P.O.:300; Blood:300; Other:240; NG/GT:1354]  Out: 2500 [Urine:1050; Other:1450]     Physical Exam  Vitals and nursing note reviewed.   Constitutional:       Comments: Cachectic, temporal wasting   HENT:      Head: Normocephalic and atraumatic.   Eyes:      Conjunctiva/sclera: Conjunctivae normal.   Neck:      Comments: Do not appreciate elevated JVP  Cardiovascular:      Comments: LVAD  Pulmonary:      Effort: Pulmonary effort is normal.   Abdominal:      Palpations: Abdomen is soft.   Musculoskeletal:         General: No swelling. Normal range of motion.      Cervical back: Normal range of motion and neck supple.      Right lower leg: No edema.      Left lower leg: No edema.   Skin:     General: Skin is warm.   Neurological:      Mental Status: He is alert and oriented to person, place, and time.          Significant Labs:  CBC:   Recent Labs   Lab 01/11/24  0351   WBC 8.96   RBC 2.76*   HGB 7.8*   HCT 25.7*      MCV 93   MCH 28.3   MCHC 30.4*     CMP:   Recent Labs   Lab 01/11/24  0351   *   CALCIUM 8.5*   ALBUMIN 2.0*   PROT 7.2       K 3.4*   CO2 25   CL 99   BUN 60*   CREATININE 3.4*   ALKPHOS 160*   ALT 26   AST 30   BILITOT 0.3     All labs within the past 24 hours have been reviewed.         Assessment/Plan:     Cardiac/Vascular  * LVAD (left ventricular assist device) present  -management per primary     Renal/  Acute renal failure with acute tubular necrosis superimposed on stage 3b chronic kidney disease  - suspect acute tubular injury secondary to hypotension/cardiogenic shock likely compounded by intra-arterial contrast and anemia with urinary sediment with granular casts    -No further plans for HD   -Continue EPO with HD  -Continue bladder scans q shift  -strict I/O's  -Continue to monitor for signs if renal recovery   -renal diet/tube feeds when not NPO, volume restriction per primary team  -renally all dose medications to eGFR  -avoid nephrotoxic agents wean feasible (i.e. NSAIDs, intra-arterial contrast, supra-therapeutic vancomycin levels, etc.)          Thank you for your consult. I will follow-up with patient. Please contact us if you have any additional questions.    Yolanda Cai DNP  Nephrology  [facility] - Cardiology Step[facility]

## 2024-01-11 NOTE — PROGRESS NOTES
[facility] - Cardiology Stepdown  Wound Care    Patient Name:  Radha Abbott   MRN:  [id number]  Date: 1/11/2024  Diagnosis: LVAD (left ventricular assist device) present    History:     Past Medical History:   Diagnosis Date    CAD (coronary artery disease)     Diabetes mellitus     HFrEF (heart failure with reduced ejection fraction)     ICD (implantable cardioverter-defibrillator) in place     MI, old        Social History     Socioeconomic History    Marital status:    Tobacco Use    Smoking status: Every Day     Current packs/day: 0.50     Types: Cigarettes   Substance and Sexual Activity    Alcohol use: Yes     Comment: rarely    Drug use: No     Social Determinants of Health     Financial Resource Strain: Low Risk  (10/27/2023)    Overall Financial Resource Strain (CARDIA)     Difficulty of Paying Living Expenses: Not hard at all   Food Insecurity: No Food Insecurity (10/27/2023)    Hunger Vital Sign     Worried About Running Out of Food in the Last Year: Never true     Ran Out of Food in the Last Year: Never true   Transportation Needs: No Transportation Needs (10/27/2023)    PRAPARE - Transportation     Lack of Transportation (Medical): No     Lack of Transportation (Non-Medical): No   Physical Activity: Inactive (10/27/2023)    Exercise Vital Sign     Days of Exercise per Week: 0 days     Minutes of Exercise per Session: 0 min   Stress: No Stress Concern Present (10/27/2023)    [employer] [place] [employer] - Occupational Stress Questionnaire     Feeling of Stress : Not at all   Social Connections: Moderately Integrated (10/27/2023)    Social Connection and Isolation Panel [NHANES]     Frequency of Communication with Friends and Family: More than three times a week     Frequency of Social Gatherings with Friends and Family: More than three times a week     Attends Alevism Services: More than 4 times per year     Active Member of Clubs or Organizations: No     Attends Club or Organization  Meetings: Never     Marital Status:    Housing Stability: Low Risk  (10/27/2023)    Housing Stability Vital Sign     Unable to Pay for Housing in the Last Year: No     Number of Places Lived in the Last Year: 1     Unstable Housing in the Last Year: No       Precautions:     Allergies as of 11/09/2023    (No Known Allergies)       [facility] Assessment Details/Treatment   Patient seen for wound care follow up.   Reviewed chart for this encounter.   See Flow Sheet for findings.      Per chart review. Radha Abbott is a 63 y.o. man with PMHx significant for CAD s/p 3V CABG in 2009 now with EF 10-15%, grade 3 diastolic dysfunction with ICD in place, normal RV function, moderate MR, PASP 41 on home , admitted for exacerbation of heart failure. Wound care cleansed buttocks with soap and water. Triad applied to buttocks to form a moisture barrier. Wound care then applied BPCO and a foam to the right heel. Wound care educated pt and wife on BPCO and Triad. Pt denies any needs at this time.      RECOMMENDATIONS:  - Wound Orders to still stand at this time.     Recommendations made to primary team for above plan via secured chat. Wound Care to follow up. Orders placed.     Discussed POC with patient and primary RN.   See EMR for orders & patient education.  Discussed POC with primary team.  ENRRIQUE Following.    Nursing to continue care.  Nursing to maintain pressure injury prevention interventions.  Contact wound care for any further questions.       01/09/24 1700   WOCN Assessment   WOCN Total Time (mins) 30   Visit Date 01/09/24   Visit Time 1700   Consult Type Follow Up   WOCN Speciality Wound   Intervention assessed;changed;applied;chart review;coordination of care   Teaching on-going        Altered Skin Integrity 12/15/23 1500 Sacral spine #1 Purple or maroon localized area of discolored intact skin or non-intact skin or a blood-filled blister.   Date First Assessed/Time First Assessed: 12/15/23 1500   Altered Skin  Integrity Present on Admission - Did Patient arrive to the hospital with altered skin?: suspected hospital acquired  Location: Sacral spine  Wound Number: #1  Is this injury device ...   Wound Image    Dressing Appearance No dressing   Drainage Amount None   Appearance Pink;Dry   Tissue loss description Full thickness   Periwound Area Intact;Dry   Care Cleansed with:;Soap and water   Dressing Applied;Other (comment)  (Triad)   Dressing Change Due 01/11/24        Altered Skin Integrity 12/26/23 2000 Right lower;posterior Heel #1 Purple or maroon localized area of discolored intact skin or non-intact skin or a blood-filled blister.   Date First Assessed/Time First Assessed: 12/26/23 2000   Altered Skin Integrity Present on Admission - Did Patient arrive to the hospital with altered skin?: suspected hospital acquired  Side: Right  Orientation: lower;posterior  Location: Heel  Wound...   Wound Image    Dressing Appearance Dry;Intact;Clean   Drainage Amount None   Appearance Intact;Maroon;Dry   Dressing Applied;Other (comment);Foam  (BPCO)   Dressing Change Due 01/12/24 01/11/2024

## 2024-01-11 NOTE — NURSING
[facility], Nurses Note -- 4 Eyes      1/10/2024       Skin assessed on: Q Shift      [] No Pressure Injuries Present    []Prevention Measures Documented    [x] Yes LDA  for Pressure Injury Previously documented     [] Yes New Pressure Injury Discovered   [] LDA for New Pressure Injury Added      Attending RN:  Annette Friedman RN     Second RN:  Ruthie CARD

## 2024-01-11 NOTE — ASSESSMENT & PLAN NOTE
- suspect acute tubular injury secondary to hypotension/cardiogenic shock likely compounded by intra-arterial contrast and anemia with urinary sediment with granular casts    -No further plans for HD   -Continue EPO with HD  -Continue bladder scans q shift  -strict I/O's  -Continue to monitor for signs if renal recovery   -renal diet/tube feeds when not NPO, volume restriction per primary team  -renally all dose medications to eGFR  -avoid nephrotoxic agents wean feasible (i.e. NSAIDs, intra-arterial contrast, supra-therapeutic vancomycin levels, etc.)

## 2024-01-11 NOTE — PT/OT/SLP PROGRESS
Occupational Therapy   Treatment    Name: Radha Abbott  MRN: [id number]  Admitting Diagnosis:  LVAD (left ventricular assist device) present  38 Days Post-Op    Recommendations:     Discharge Recommendations: High Intensity Therapy  Discharge Equipment Recommendations:  hospital bed, wheelchair ((B) platform RW)  Barriers to discharge:  None    Assessment:     Radha Abbott is a 63 y.o. male with a medical diagnosis of LVAD (left ventricular assist device) present.  He presents with the following performance deficits affecting function are weakness, impaired endurance, impaired self care skills, impaired functional mobility, gait instability, impaired balance, decreased coordination, decreased upper extremity function, decreased lower extremity function, decreased safety awareness, pain, impaired cardiopulmonary response to activity, impaired fine motor. Pt tolerated session well and continues to demonstrate improvements in functional (I) with switching from wall to battery power. Pt is progressing toward all goals however continues to primarily be limited by generalized weakness, impaired tolerance to activity, and impaired coordination. Pt would continue to benefit from skilled OT services to maximize functional independence with ADLs and functional mobility, reduce caregiver burden, and facilitate safe discharge in the least restrictive environment.     A hospital bed is recommended because the patient requires positioning of the body in ways that are not feasible using an ordinary bed, pillows, and/or wedges. Due to the patient's limitations in bed mobility, they are not able to independently make changes in body positions without the use of the bed to assist.      A wheelchair is recommended due to the identified mobility limitation(s) that significantly impair this patient's ability to participate in one or more mobility related activities of daily living in customary locations in the home. These mobility  limitations cannot be sufficiently resolved with the use of a cane or walker.  The use of a manual wheelchair will significantly improve this patient's ability to participate in mobility related activities of daily living and the patient has expressed willingness to use the manual wheelchair in the home.      A (Platform) Rolling Walker is recommended due to the patient's mobility limitations identified which cannot be resolved using a cane. The patient's functional mobility deficits are impairing their ability to participate in one or more activities of daily living, but this can be sufficiently resolved with the use of a Rolling Walker on a regular basis in the home.      Rehab Prognosis:  Good; patient would benefit from acute skilled OT services to address these deficits and reach maximum level of function.       Plan:     Patient to be seen 5 x/week to address the above listed problems via self-care/home management, therapeutic activities, therapeutic exercises, neuromuscular re-education  Plan of Care Expires: 02/03/24  Plan of Care Reviewed with: patient, spouse    Subjective     Chief Complaint: impaired fine motor skills/weakness with LVAD management  Patient/Family Comments/goals: to increase functional (I)  Pain/Comfort:  Pain Rating 1: 0/10  Pain Rating Post-Intervention 1: 0/10    Objective:     Communicated with: RN prior to session.  Patient found HOB elevated with LVAD, NG tube, telemetry upon OT entry to room.    General Precautions: Standard, fall, LVAD, sternal    Orthopedic Precautions:N/A  Braces: N/A  Respiratory Status: Room air     Occupational Performance:     Bed Mobility:    Patient completed Scooting/Bridging with maximal assistance  Patient completed Supine to Sit with moderate assistance     Functional Mobility/Transfers:  Patient completed Bed <> Chair Transfer using Squat Pivot technique with moderate assistance with no assistive device    Activities of Daily Living:  Lower Body  Dressing: maximal assistance required to don b/l shoes EOB      AMPAC 6 Click ADL: 13    Treatment & Education:  -Education on VAD management including LVAD sternal precautions; proper anchor placement and driveline securement for DLES integrity; purpose of Self Test; identifying parts including controller, driveline, power cords, controller pouch, batteries, clips, consolidation bag, battery charger; pairing and rotating batteries; checking battery power; contents of emergency bag and need for having emergency bag when out of room/out of house; retrieving VAD numbers from controller; recording numbers in binder; transitioning to/from battery and wall power   -Education on energy conservation and task modification to maximize safety and (I) during ADLs and mobility  -Education on importance of OOB activity to improve overall activity tolerance and promote recovery  -Pt educated to call for assistance and to transfer with hospital staff only  -Provided education regarding role of OT, POC, & discharge recommendations with pt and wife verbalizing understanding.  Pt had no further questions & when asked whether there were any concerns pt reported none.     Patient left up in chair with all lines intact, call button in reach, RN notified, and wife and VAD coordinator present    GOALS:   Multidisciplinary Problems       Occupational Therapy Goals          Problem: Occupational Therapy    Goal Priority Disciplines Outcome Interventions   Occupational Therapy Goal     OT, PT/OT Ongoing, Progressing    Description: Goals to be met by: 2/3/24    Patient will increase functional independence with ADLs by performing:    UB Dressing with SBA  LE Dressing with Supervision.  Grooming while standing at sink with Supervision.  Toileting from bedside commode with SBA for hygiene and clothing management.   Step transfer to bedside commode with SBA  [place] with VAD management during ADLs                             Time Tracking:      OT Date of Treatment: 01/11/24  OT Start Time: 0946  OT Stop Time: 1009  OT Total Time (min): 23 min    Billable Minutes:Self Care/Home Management 10  Therapeutic Activity 13    OT/PRIETO: OT     Number of PRIETO visits since last OT visit: 0    1/11/2024

## 2024-01-11 NOTE — NURSING
Pt sent to Xray via wheelchair. Patient AOX4 when leaving room. Tube feeding paused for trip, will flush and continue when he gets back to room. Sent with LVAD equipment and emergency bag.

## 2024-01-11 NOTE — PROGRESS NOTES
Update:  CRISTAL assisted pt's wife with sending a work excuse letter to her HR dept at [employer] per wife's request.    FMLA paperwork in process.    SW remains available.

## 2024-01-11 NOTE — SUBJECTIVE & OBJECTIVE
Interval History: HD yesterday, tolerated well. .     Review of patient's allergies indicates:  No Known Allergies  Current Facility-Administered Medications   Medication Frequency    0.9%  NaCl infusion (for blood administration) Q24H PRN    0.9%  NaCl infusion Continuous    acetaminophen tablet 1,000 mg TID    alteplase injection 2 mg Once    amiodarone tablet 400 mg Daily    balsam peru-castor oiL Oint BID    bisacodyL suppository 10 mg Daily PRN    cholecalciferol (vitamin D3) 400 units/mL oral liquid Daily    dextrose 10 % infusion Continuous PRN    dextrose 10% bolus 125 mL 125 mL PRN    dextrose 10% bolus 125 mL 125 mL PRN    dextrose 10% bolus 250 mL 250 mL PRN    dextrose 10% bolus 250 mL 250 mL PRN    epoetin zohaib injection 8,100 Units Every Tues, Thurs, Sat    famotidine tablet 20 mg Daily    gabapentin 250 mg/5 mL (5 mL) solution 125 mg Q12H    glucagon (human recombinant) injection 1 mg PRN    heparin (porcine) injection 1,000 Units PRN    hydrALAZINE injection 10 mg Q6H PRN    insulin aspart U-100 pen 0-5 Units Q4H PRN    insulin aspart U-100 pen 4 Units Q24H    insulin aspart U-100 pen 4 Units Q24H    insulin aspart U-100 pen 4 Units Q24H    insulin aspart U-100 pen 6 Units Q24H    insulin aspart U-100 pen 6 Units Q24H    insulin aspart U-100 pen 6 Units Q24H    ondansetron injection 4 mg Q6H PRN    oseltamivir capsule 30 mg Every Tues, Thurs, Sat    polyethylene glycol packet 17 g Daily    potassium chloride SA CR tablet 20 mEq Once    psyllium husk (aspartame) 3.4 gram packet 1 packet Daily    senna-docusate 8.6-50 mg per tablet 2 tablet Daily    sodium chloride 0.9% flush 10 mL PRN    traMADoL tablet 50 mg Q8H PRN    traZODone tablet 50 mg QHS    venlafaxine tablet 37.5 mg Daily    [START ON 1/12/2024] warfarin (COUMADIN) tablet 2.5 mg Every Mon, Wed, Fri    warfarin (COUMADIN) tablet 5 mg Every Tues, Thurs, Sat, Sun    zolpidem tablet 5 mg Nightly PRN       Objective:     Vital Signs (Most  Recent):  Temp: 98.1 °F (36.7 °C) (01/11/24 0749)  Pulse: 104 (01/11/24 1000)  Resp: 18 (01/11/24 0749)  BP: (!) 80/0 (01/11/24 0801)  SpO2: 100 % (01/11/24 0749) Vital Signs (24h Range):  Temp:  [97.7 °F (36.5 °C)-98.4 °F (36.9 °C)] 98.1 °F (36.7 °C)  Pulse:  [] 104  Resp:  [17-18] 18  SpO2:  [97 %-100 %] 100 %  BP: (74-89)/(0-54) 80/0     Weight: 69.9 kg (154 lb) (01/10/24 0756)  Body mass index is 20.89 kg/m².  Body surface area is 1.88 meters squared.    I/O last 3 completed shifts:  In: 2194 [P.O.:300; Blood:300; Other:240; NG/GT:1354]  Out: 2500 [Urine:1050; Other:1450]     Physical Exam  Vitals and nursing note reviewed.   Constitutional:       Comments: Cachectic, temporal wasting   HENT:      Head: Normocephalic and atraumatic.   Eyes:      Conjunctiva/sclera: Conjunctivae normal.   Neck:      Comments: Do not appreciate elevated JVP  Cardiovascular:      Comments: LVAD  Pulmonary:      Effort: Pulmonary effort is normal.   Abdominal:      Palpations: Abdomen is soft.   Musculoskeletal:         General: No swelling. Normal range of motion.      Cervical back: Normal range of motion and neck supple.      Right lower leg: No edema.      Left lower leg: No edema.   Skin:     General: Skin is warm.   Neurological:      Mental Status: He is alert and oriented to person, place, and time.          Significant Labs:  CBC:   Recent Labs   Lab 01/11/24  0351   WBC 8.96   RBC 2.76*   HGB 7.8*   HCT 25.7*      MCV 93   MCH 28.3   MCHC 30.4*     CMP:   Recent Labs   Lab 01/11/24  0351   *   CALCIUM 8.5*   ALBUMIN 2.0*   PROT 7.2      K 3.4*   CO2 25   CL 99   BUN 60*   CREATININE 3.4*   ALKPHOS 160*   ALT 26   AST 30   BILITOT 0.3     All labs within the past 24 hours have been reviewed.

## 2024-01-11 NOTE — PLAN OF CARE
Plan of care discussed with patient. Patient AOX4 and VS stable. Patient up with max assist, fall precautions in place. LVAD DP and numbers WNL, smooth LVAD hum. Patient has no complaints of pain. Tube feeding continuing.  Discussed medications and care. Patient has no questions at this time.

## 2024-01-11 NOTE — ASSESSMENT & PLAN NOTE
-RD and SLP following  -Tolerating tube feeds. Total daily caloric intake increased to 2160  -Failed MBBS 12/20, continue TF.  -Speech following, would appreciate their continued recommendations

## 2024-01-11 NOTE — NURSING
Pt aaox3 while sitting up in chair with wife, Arlyn, at bedside.  LVAD history interrogated with no abnormal events noted.  LVAD numbers WNL. Approximately 60 minutes was spent with the patient and wife providing education. Pt denies any needs at this time. Patient and wife educated on below information:  VAD Binder given to patient to fill out. Reviewed with patient the workbook, encouraged patient to work through while learning. Also showed patient their Log Sheets, Educated on taking vital signs everyday and recording the results, recording the Coumadin level and dosages everyday, taking weight and monitoring daily, assisted patient in filling out today's information.  : Reviewed in depth the . Patient was shown the display button, silence button, and battery button and what each button does including silencing alarms, checking battery status, and toggle through LCD screen. Reviewed how to preform self  test and review last 6 alarms. Also reviewed with patient the lights and sounds the controller makes during alarms  MODULAR CABLE/ DLES: Reviewed with patient their dressing change information and how to rate their DLES. Reviewed the importance of using an anchor and properly wearing equipment. Also reviewed hen patient will be able to shower again and stitch removal. Showed patient how to turn modular cable and properly care for as well as explained why patient needs to properly care for equipment.  EMERGENCY BAG: Reviewed with patient the importance of emergency bag. Showed patient what should be in the bag at all times including backup controller, 2 batteries, 2 clips, and emergency cards. Reviewed that patient should have bag with them at all times.  BATTERIES AND : Reviewed with patient the battery charger, batteries, and battery clips. Patient was shown how to properly charge batteries, discussed battery rotation, and proper connection on battery clips. Demonstrated  how to check battery level on both battery and . Reviewed  alarms and lights and how to troubleshoot issues. Reviewed how long batteries last and calibration.   MPU: Reviewed with patient their Mobile Power Unit (MPU). Patient was shown how to connect to unit, educated on what patient should keep next to their bed and why-- emergency bag and flashlight, Reviewed what lights and alarms on MPU mean and how to troubleshoot. Explained never switching between MPU and power module. Explained if patient is to get an alarm to switch over to batteries immediatly.   ALARMS: Reviewed all alarms with patient. Explained and demonstrated red heart, red battery, driveline disconnect, yellow wrench, and yellow ok alarms. Had patient trouble shoot different situations and provide the solutions to the alarms. Had patient explain what could be happening when the alarm sounds.  CONTROLLER EXCHANGE was practiced with patient. Reasons for controller exchange reviewed along with instructions on how to occur. Patient Instructed to never preform exchange with out contacting a coordinator and that the patient should not be alone when exchanging.  PAGING VS CALLING: Educated patient on Contact information sheet. Thoroughly explained the difference between Paging vs. Calling the coordinators. Patient practiced paging Coordinator.  DEVICE PARTS: Educated patient on Pieces of the LVAD device. Reviewed , modular cable, tether cable, batteries and clips, battery charger, and MPU.  ACTIVITIES: Reviewed with patient activities they may participate in including intimacy, driving, traveling, severe weather, and exercising. Also reviewed with patient what they should do in an emergency situation. Explained all precautions patient needs to take during these situation.    Patient and wife were able to verbalize all correct answers regarding above information to VAD Coordinator.     PATIENT and WIFE ARE CHECKED OFF ON  ALARMS  CAREGIVER, wife, Arlyn, IS CHECKED OFF ON DRESSING CHANGE

## 2024-01-11 NOTE — PROGRESS NOTES
[facility] - Cardiology Stepdown  Heart Transplant  Progress Note    Patient Name: Radha Abbott  MRN: [id number]  Admission Date: 11/9/2023  Hospital Length of Stay: 63 days  Attending Physician: Sandhya Price MD  Primary Care Provider: Vasu Kong MD  Principal Problem:LVAD (left ventricular assist device) present    Subjective:   Interval History: No acute complaints. Received 1 unit of pRBC with HD yesterday. H/H improved.     Continuous Infusions:   sodium chloride 0.9%      dextrose 10 % in water (D10W)       Scheduled Meds:   acetaminophen  1,000 mg Per NG tube TID    alteplase  2 mg Intra-Catheter Once    amiodarone  400 mg Per NG tube Daily    balsam peru-castor oiL   Topical (Top) BID    cholecalciferol (vitamin D3)  2,000 Units Per NG tube Daily    epoetin zohaib (PROCRIT) injection  50 Units/kg Subcutaneous Every Tues, Thurs, Sat    famotidine  20 mg Per G Tube Daily    gabapentin  125 mg Per NG tube Q12H    insulin aspart U-100  4 Units Subcutaneous Q24H    insulin aspart U-100  4 Units Subcutaneous Q24H    insulin aspart U-100  4 Units Subcutaneous Q24H    insulin aspart U-100  6 Units Subcutaneous Q24H    insulin aspart U-100  6 Units Subcutaneous Q24H    insulin aspart U-100  6 Units Subcutaneous Q24H    oseltamivir  30 mg Oral Every Tues, Thurs, Sat    polyethylene glycol  17 g Per NG tube Daily    psyllium husk (aspartame)  1 packet Per NG tube Daily    senna-docusate 8.6-50 mg  2 tablet Per NG tube Daily    traZODone  50 mg Per NG tube QHS    venlafaxine  37.5 mg Per G Tube Daily    [START ON 1/12/2024] warfarin  2.5 mg Oral Every Mon, Wed, Fri    warfarin  5 mg Oral Every Tues, Thurs, Sat, Sun     PRN Meds:0.9%  NaCl infusion (for blood administration), bisacodyL, dextrose 10 % in water (D10W), dextrose 10%, dextrose 10%, dextrose 10%, dextrose 10%, glucagon (human recombinant), heparin (porcine), hydrALAZINE, insulin aspart U-100, ondansetron, Flushing PICC/Midline Protocol **AND**  [DISCONTINUED] sodium chloride 0.9% **AND** sodium chloride 0.9%, traMADoL, zolpidem    Review of patient's allergies indicates:  No Known Allergies  Objective:     Vital Signs (Most Recent):  Temp: 98.1 °F (36.7 °C) (01/11/24 1114)  Pulse: 98 (01/11/24 1200)  Resp: 18 (01/11/24 1114)  BP: (!) 76/0 (01/11/24 1155)  SpO2: 97 % (01/11/24 1114) Vital Signs (24h Range):  Temp:  [97.7 °F (36.5 °C)-98.4 °F (36.9 °C)] 98.1 °F (36.7 °C)  Pulse:  [] 98  Resp:  [17-18] 18  SpO2:  [97 %-100 %] 97 %  BP: (74-82)/(0) 76/0     Patient Vitals for the past 72 hrs (Last 3 readings):   Weight   01/10/24 0756 69.9 kg (154 lb)   01/09/24 0743 68.9 kg (152 lb)   01/08/24 1401 68 kg (150 lb)       Body mass index is 20.89 kg/m².      Intake/Output Summary (Last 24 hours) at 1/11/2024 1234  Last data filed at 1/11/2024 1200  Gross per 24 hour   Intake 1114 ml   Output 550 ml   Net 564 ml         Hemodynamic Parameters:       Telemetry: SR       Physical Exam  Constitutional:       Comments: Cachectic, temporal wasting   HENT:      Head: Normocephalic and atraumatic.   Eyes:      Conjunctiva/sclera: Conjunctivae normal.      Pupils: Pupils are equal, round, and reactive to light.   Neck:      Comments: Do not appreciate elevated JVP  Cardiovascular:      Rate and Rhythm: Normal rate and regular rhythm.      Comments: Smooth VAD hum  Pulmonary:      Effort: Pulmonary effort is normal.      Breath sounds: Normal breath sounds.   Abdominal:      General: Bowel sounds are normal.      Palpations: Abdomen is soft.   Musculoskeletal:         General: No swelling. Normal range of motion.      Cervical back: Normal range of motion and neck supple.   Skin:     General: Skin is warm and dry.      Capillary Refill: Capillary refill takes 2 to 3 seconds.   Neurological:      General: No focal deficit present.      Mental Status: He is alert and oriented to person, place, and time.            Significant Labs:  CBC:  Recent Labs   Lab  01/09/24  0717 01/10/24  0338 01/11/24  0351   WBC 8.28 8.29 8.96   RBC 2.31* 2.29* 2.76*   HGB 6.5* 6.4* 7.8*   HCT 21.2* 21.0* 25.7*    452* 430   MCV 92 92 93   MCH 28.1 27.9 28.3   MCHC 30.7* 30.5* 30.4*       BNP:  Recent Labs   Lab 01/05/24  1059 01/08/24  0433 01/10/24  0338   * 652* 683*       CMP:  Recent Labs   Lab 01/09/24  0454 01/10/24  0337 01/11/24  0351   * 129* 113*   CALCIUM 8.6* 8.8 8.5*   ALBUMIN 1.9* 1.9* 2.0*   PROT 6.9 7.0 7.2   * 133* 136   K 3.8 3.5 3.4*   CO2 21* 21* 25   CL 99 98 99   * 107* 60*   CREATININE 5.8* 6.0* 3.4*   ALKPHOS 156* 164* 160*   ALT 26 26 26   AST 32 29 30   BILITOT 0.3 0.3 0.3        Coagulation:   Recent Labs   Lab 01/09/24  0840 01/10/24  0337 01/11/24  0351   INR 3.6* 3.8* 2.4*       LDH:  Recent Labs   Lab 01/09/24  0454 01/10/24  0337 01/11/24  0351   * 330* 329*       Microbiology:  Microbiology Results (last 7 days)       Procedure Component Value Units Date/Time    Fungus culture [[id number]] Collected: 12/06/23 1532    Order Status: Completed Specimen: Body Fluid from Lung, Right Updated: 01/09/24 1154     Fungus (Mycology) Culture No fungus isolated after 4 weeks    Narrative:      Bronchial Wash    Throat culture [[id number]]  (Abnormal)  (Susceptibility) Collected: 01/04/24 1942    Order Status: Completed Specimen: Throat Updated: 01/08/24 1348     RESPIRATORY CULTURE - THROAT PSEUDOMONAS AERUGINOSA   Many  Normal respiratory bobby also present      Influenza A & B by Molecular [[id number]] Collected: 01/05/24 1607    Order Status: Completed Specimen: Nasopharyngeal Swab Updated: 01/05/24 1647     Influenza A, Molecular Negative     Influenza B, Molecular Negative     Flu A & B Source NP    Group A Strep, Molecular [[id number]] Collected: 01/05/24 1004    Order Status: Completed Specimen: Throat Updated: 01/05/24 1039     Group A Strep, Molecular Negative     Comment: Arcanobacterium haemolyticum and Beta  Streptococcus group C   and G will not be detected by this test method.  Please order   Throat Culture ([id number]) if suspected.         Respiratory Infection Panel (PCR), Nasopharyngeal [[id number]] Collected: 01/04/24 1942    Order Status: Completed Specimen: Nasopharyngeal Swab Updated: 01/04/24 2101     Respiratory Infection Panel Source NP Swab     Adenovirus Not Detected     Coronavirus 229E, Common Cold Virus Not Detected     Coronavirus HKU1, Common Cold Virus Not Detected     Coronavirus NL63, Common Cold Virus Not Detected     Coronavirus OC43, Common Cold Virus Not Detected     Comment: The Coronavirus strains detected in this test cause the common cold.  These strains are not the COVID-19 (novel Coronavirus)strain   associated with the respiratory disease outbreak.          SARS-CoV2 (COVID-19) Qualitative PCR Not Detected     Human Metapneumovirus Not Detected     Human Rhinovirus/Enterovirus Not Detected     Influenza A (subtypes H1, H1-2009,H3) Not Detected     Influenza B Not Detected     Parainfluenza Virus 1 Not Detected     Parainfluenza Virus 2 Not Detected     Parainfluenza Virus 3 Not Detected     Parainfluenza Virus 4 Not Detected     Respiratory Syncytial Virus Not Detected     Bordetella Parapertussis (RB3412) Not Detected     Bordetella pertussis (ptxP) Not Detected     Chlamydia pneumoniae Not Detected     Mycoplasma pneumoniae Not Detected    Narrative:      For all other respiratory sources, order [id number] -  Respiratory Viral Panel by PCR            I have reviewed all pertinent labs within the past 24 hours.    Estimated Creatinine Clearance: 22.6 mL/min (A) (based on SCr of 3.4 mg/dL (H)).    Diagnostic Results:  I have reviewed and interpreted all pertinent imaging results/findings within the past 24 hours.  Assessment and Plan:     63 year old male with hx of CAD s/p 3v CABG (unclear anatomy, 2009), ICM with a recent EF of 15-20% s/p ICD (medtronik 2009), DM2 (a1c 7.7), HTN, HLD, Vfib  on amio who presents to the ED with CC of SOB     Pt was recently admitted to [facility] as a transfer.  He was started on a Lasix gtt and did well.  Started on gDMT and discharged home on  5 with plans to follow up in hospitals clinic for ongoing transplant evaluation at another facility.  He says that about a few days ago he started to notice LE swelling.  This turned into Nelson and orthopnea.  He says he can't walk to the bathroom without getting SOB.  He also complains of weight gain.  He takes torsemide 20mg BID at home and was told to trial additional Lasix which he did without any improvement.  He was rx metolazone but has not been filled.  He came to the ED     In the ED he was AF with stable VS on RA.  CBC showing chronic anemia.  CMP notable for acute on chronic CKD with baseline around 2.1 and Cr now 2.6.  BNP elevated.  Lactate neg.  CXR showing pulmonary edema.  I evaluated the pt at the bedside.  Bedside TTE showing CVP >15.  He was subsequently admitted to the CCU for diuresis.     He was continued on his home  and was started on a lasix gtt, which he responded well to overnight (net -1700cc. He feels much better this morning as well. Our transplant coordinators have been working on insurance approval and he is now being transferred to hospitals service for transplant evaluation.     * LVAD (left ventricular assist device) present  -HeartMate 3 Implanted  12/1/2023  as DT  -HTS Primary  -Implanted by Dr. Washington  -Hold Coumadin, dosing by PharmD.  Goal INR 2.0-3.0 . Supratherapeutic today.   -Antiplatelets Not on ASA  -LDH is stable overall today. Will continue to monitor daily.  -Weaned off midodrine.  -Speed set at  4900   rpm, LSL 4500 rpm   -Interrogation notable for no events  -ECHO 1/2/24 mild TR, LVEDD 6.25 with HM3 set to 4900 rpm   -Not listed for OHTx, declined for OHTX due to comorbidities   -PT/OT/Speech. Recommending rehab but HD + LVAD may be barrier      Procedure: Device Interrogation Including  analysis of device parameters  Current Settings: Ventricular Assist Device  Review of device function is stable/unstable stable        1/11/2024    11:56 AM 1/11/2024     8:02 AM 1/11/2024     3:52 AM 1/10/2024    11:58 PM 1/10/2024     8:02 PM 1/10/2024     4:08 PM 1/10/2024     1:00 PM   TXP LVAD INTERROGATIONS   Type HeartMate3 HeartMate3 HeartMate3 HeartMate3 HeartMate3 HeartMate3 HeartMate3   Flow 4.2 3.8 3.9 4.1 4.2 3.9 4.1   Speed 4900 4900 4900 4900 4900 4900 4900   PI 3.7 4.9 4.6 4.1 3.3 5.1 4.9   Power ([place]) 3.4 3.3 3.3 3.3 3.4 3.3 3.2   LSL 4500 4500 4500 4500 4500 4500 4500   Pulsatility     Intermittent pulse Intermittent pulse Intermittent pulse         Right parotid mass  - 2.2cm R. Parotid mass noted.   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 Can follow up as outpatient w/ ENT for FNA. No further interventions needed as inpatient.     Lymphadenopathy  +ill contacts  Respiratory panel negative  Strep test negative.   Throat culture showing pseudomonas. As per ID , will treat w/ 5 day course of zosyn    Unilateral complete vocal fold paralysis  -Appreciate ENT's help  -S/P augmentation of paralyzed left vocal cord 12/18    Dysphonia  -Speech following closely  -Appreciate ENT's help. S/P augmentation of paralyzed left vocal cord 12/18    Moderate malnutrition  -RD and SLP following  -Tolerating tube feeds. Total daily caloric intake increased to 2160  -Failed MBBS 12/20, continue TF.  -Speech following, would appreciate their continued recommendations     Depressed mood  -Psychiatry consulted for depressed mood, SI when left alone  -Recommend sitter when alone (possibly with transition to stepdown).  -Psych reconsulted 1/2, agree with venlafaxine and trazadone    IVÁN (acute kidney injury)  -Nephrology following      Acute on chronic combined systolic and diastolic CHF, NYHA class 4  Pt with known ICM with an EF of 25% on home  presented with decompensated HF.    -S/P LVAD    PAF  (paroxysmal atrial fibrillation)  Known hx of pAF. In sinus rhythm on admission, but 1 run of AF RVR overnight. He spontaneously converted.  - Continue home amiodarone 400mg qd  - Continue AC, on hold with supratherapeutic INR       Acute renal failure with acute tubular necrosis superimposed on stage 3b chronic kidney disease  IVÁN on CKD2. Baseline Cr of 1.7-2.0.   - Hold ARNi  -Trend BMPs daily   -Nephrology following   -SLED/SCUF for volume removal began on 12/1. He is anuric  -Did not respond to diuretic challenge with 240 mg IV Lasix, 1000 mg Diuril, and 500 mg IV Diamox done 12/14  -Diuretic challenge 1/8 with 160 mg IVP Lasix, total  cc/24 hours   -Now on HD TThSat  -Tunneled trialysis line placed 12/22 by IR    Type 2 diabetes mellitus without complication, without long-term current use of insulin  -A1c 7.6, not insulin dependent  - Endocrine following, appreciate assistance with blood glucose management    CAD (coronary artery disease)  -S/p 3vCABG in 2009  -Lipitor on hold 2/2 mild transaminitis  -Not on ASA post VAD    Ventricular tachycardia  Hx VT s/p ICD placement (medtronic 2009)  - Continue home amio 400mg qd        Vero Lozada PA-C  Heart Transplant  [facility] - Cardiology Stepdown

## 2024-01-11 NOTE — PT/OT/SLP PROGRESS
Physical Therapy Treatment    Patient Name:  Radha Abbott   MRN:  [id number]  Admitting Diagnosis:  LVAD (left ventricular assist device) present   Recent Surgery: Procedure(s) (LRB):  CLOSURE, WOUND, STERNUM (N/A)  INSERTION, GRAFT, PERICARDIUM  DRAINAGE, PLEURAL EFFUSION 38 Days Post-Op  Admit Date: 11/9/2023  Length of Stay: 63 days    Recommendations:     Discharge Recommendations:    High Intensity Therapy   Discharge Equipment Recommendations: wheelchair, hospital bed, other (see comments) (bilateral platform rolling walker)   Barriers to discharge: Decreased caregiver support and increased need for caregiver assistance, fall risk    Plan:     During this hospitalization, patient to be seen 6 x/week to address the identified rehab impairments via gait training, therapeutic activities, therapeutic exercises, neuromuscular re-education and progress towards the established goals.  Plan of Care Expires:  02/05/24  Plan of Care Reviewed with: patient, spouse    Assessment:     Radha Abbott is a 63 y.o. male admitted with a medical diagnosis of LVAD (left ventricular assist device) present. Pt agreeable to therapy session. Pt continues to show improvements with gait training as he increased distance gait trained but pt fatigues quickly resulting in increasing need for assistance with sit to stand transfers during session. Pt also requires increased cueing for posture, ambulating within AURORA of PRW, and glut extension. Patient limited by global weakness and deconditioning at this time. Patient would benefit from skilled therapy services to maximize safety and independence, increase activity tolerance, decrease fall risk, decrease caregiver burden, improve QOL, improve patient's functional mobility, and decrease risk of contractures and pressure sores. Patient has demonstrated sufficient progression to warrant high intensity therapy evidenced by objectives noted below.     Problem List: weakness, impaired endurance,  impaired self care skills, impaired functional mobility, gait instability, impaired balance, decreased coordination, decreased upper extremity function, decreased lower extremity function, decreased safety awareness, pain, impaired coordination, impaired fine motor, impaired cardiopulmonary response to activity.  Rehab Prognosis: Good; patient would benefit from acute skilled PT services to address these deficits and reach maximum level of function.      Goals:   Multidisciplinary Problems       Physical Therapy Goals          Problem: Physical Therapy    Goal Priority Disciplines Outcome Goal Variances Interventions   Physical Therapy Goal     PT, PT/OT Ongoing, Progressing     Description: Goals to be met by: 23     Patient will increase functional independence with mobility by performin. Sit to stand transfer with modified independence  2. Bed to chair transfer with supervision  3. Gait  x 150 feet with supervision using physician approved AD with standing rest breaks prn.   4. Lower extremity exercise program x30 reps per handout, with independence  5. Recite 3/3 sternal precautions and remain complaint with precautions throughout session with no verbal cues                     Multidisciplinary Problems (Resolved)          Problem: Physical Therapy    Goal Priority Disciplines Outcome Goal Variances Interventions   Physical Therapy Goal   (Resolved)     PT, PT/OT Met     Description: Goals to be met by: 23     Patient will increase functional independence with mobility by performin. Evaluate pt's need for physical therapy.                          Subjective     RN notified prior to session. Wife present upon PT entrance into room. Patient agreeable to PT treatment session.    Chief Complaint: decreased mobility  Patient/Family Comments/goals: increase strength and distance walked  Pain/Comfort:  Pain Rating 1: 0/10  Pain Rating Post-Intervention 1: 0/10      Objective:     Additional  staff present: Rehab Tech (Gabriella)    Patient found up in chair with: LVAD, NG tube, telemetry   Cognition:   Alert, Cooperative, and Flat affect  Patient is oriented to Person, Place, Time, Situation  General Precautions: Standard, Cardiac fall, LVAD, sternal   Orthopedic Precautions:N/A   Braces: N/A   Body mass index is 21.14 kg/m².  Oxygen Device: Room Air  Vitals: BP (!) 76/0 (BP Location: Left arm, Patient Position: Sitting)   Pulse 98   Temp 98.1 °F (36.7 °C) (Oral)   Resp 18   Ht 6' (1.829 m)   Wt 70.7 kg (155 lb 13.8 oz)   SpO2 97%   BMI 21.14 kg/m²     Outcome Measures:  AM-PAC 6 CLICK MOBILITY  Turning over in bed (including adjusting bedclothes, sheets and blankets)?: 3  Sitting down on and standing up from a chair with arms (e.g., wheelchair, bedside commode, etc.): 2  Moving from lying on back to sitting on the side of the bed?: 3  Moving to and from a bed to a chair (including a wheelchair)?: 2  Need to walk in hospital room?: 3  Climbing 3-5 steps with a railing?: 1  Basic Mobility Total Score: 14     Functional Mobility:    Bed Mobility:   Pt found/returned to bedside chair    Transfers:   Sit <> Stand Transfer:   minimum assistance and of 2 persons with platform walker x1 trial from chair   maximal assistance and of 2 persons with platform walker x2 trials from chair    Balance:  Standing:  Static: contact guard assistance with platform walker  Dynamic: contact guard assistance and minimum assistance with platform walker      Gait:  Patient ambulated: 14', 76', 36' (seated break between trials)   Patient required: contact guard and minimal assist  Patient used:  platform walker   Gait Deviation(s): unsteady gait, decreased step length, narrow base of support, decreased weight shift, ambulates outside AURORA of RW, flexed posture, increased fwd lean onto RW, increased hip and knee flexion  all lines remained intact throughout ambulation trial  Chair follow for patient safety  Mask donned for  out of room ambulation  LVAD: Consolidation bag utilized   LVAD: Emergency bag present throughout ambulation trial out of room  Comments: Patient with fwd lean onto PRW with increased hip and knee flexion that worsens with patient fatigue. Pt requires cueing about 50% of the time for increased AURORA, walking within AURORA PRW, scapular retraction and depression, upright stance and fwd gaze.   During first gait trial, pt gait trained in room with slight turns and pt required increased VC/TC cueing for safety and min A throughout. 2nd and 3rd trials straight level surface ambulation in hallway.     Education:  Pt and wife educated on PT role to address functional deficits and POC  Pt and wife educated to call staff for assistance with mobility  Pt an wife educated on LE therex to perform while up in chair during day  Pt encouraged to sit upright in chair and increase cervical extension; wife educated to cue pt to increase cervical extension while sitting in chair   LVAD: pt found/left on battery power. No alarms sounded    Patient left up in chair with all lines intact, call button in reach, and wife and RN present.    Time Tracking:     PT Received On: 01/11/24  PT Start Time: 1040     PT Stop Time: 1105  PT Total Time (min): 25 min     Billable Minutes:   Gait Training 25 minutes    Treatment Type: Treatment  PT/PTA: PT       1/11/2024

## 2024-01-11 NOTE — ASSESSMENT & PLAN NOTE
Lab Results   Component Value Date    CREATININE 3.4 (H) 01/11/2024     Avoid insulin stacking  Titrate insulin slowly

## 2024-01-11 NOTE — PT/OT/SLP PROGRESS
Speech Language Pathology Treatment    Patient Name:  Radha Abbott   MRN:  [id number]  Admitting Diagnosis: LVAD (left ventricular assist device) present    Recommendations:                 General Recommendations:  Dysphagia therapy  Diet recommendations:  Minced & Moist Diet - IDDSI Level 5, Honey-thick liquids - IDDSI Level 3 All liquids MUST be a honey thick consistency - Pt is a silent aspirator.   Pre-thickened liquids level 3 with yellow triangle - MODERATELY THICK  Can also thicken using thickener packets, 1 full pack to 4 oz of fluid   Can continue with ice chips ONLY when completing dysphagia exercises.   Encourage double swallows between bites and sips to clear residue   Aspiration Precautions: Strict aspiration precautions   General Precautions: Standard, fall  Communication strategies:  none    Assessment:     Radha Abbott is a 63 y.o. male with an SLP diagnosis of Dysphagia.     Subjective     Seen with wife at bedside.   Patient goals: none stated     Pain/Comfort:  Pain Rating 1: 0/10    Respiratory Status: Room air    Objective:     Has the patient been evaluated by SLP for swallowing?   Yes  Keep patient NPO? No   Current Respiratory Status:    Room air     Pt seen for ongoing dysphagia tx. Pt seen consuming pudding prior to entry. Pt stating tolerance of current diet, however stating dislike for taste of food. Discussed with pt only consuming ice chips with dysphagia exercises at this time, wife reports pt with overt coughing during night shift with ice chips, reiterated limiting ice chips only with exercises. Educated pt and spouse on swallowing anatomy, MBSS results, silent aspiration and ongoing SLP POC. Worked on intake of honey thick liquids, purees and minced/moist solids. Pt with intermittent throat clearing with trials. Reviewed swallow exercises with pt, pt requiring MOD cues to complete exercises, however was unable to complete a second swallow. Pt completed falsettot e x5, demonstrating  difficulty with akilah, and effortful swallow x4. All questions answered within SLP scope practice. Pt and spouse verbalizing understanding. SLP will continue to follow.     Goals:   Multidisciplinary Problems       SLP Goals          Problem: SLP    Goal Priority Disciplines Outcome   SLP Goal     SLP Ongoing, Progressing   Description: Speech Language Pathology Goals  Goals expected to be met by 12/24    1. Pt will participate in ongoing assessment of swallow function to help determine least restrictive diet                            Plan:     Patient to be seen:  4 x/week   Plan of Care reviewed with:  patient, spouse   SLP Follow-Up:  Yes       Discharge recommendations:  High Intensity Therapy   Barriers to Discharge:  None    Time Tracking:     SLP Treatment Date:   01/11/24  Speech Start Time:  1130  Speech Stop Time:  1146     Speech Total Time (min):  16 min    Billable Minutes: Treatment Swallowing Dysfunction 8 and Self Care/Home Management Training 8    01/11/2024

## 2024-01-11 NOTE — PLAN OF CARE
Recommendations  1) Continue current minced and moist diet as tolerated per SLP recs with honey-thick liquids, encourage PO intake as needed- please note Boost pudding is discontinued   2) Continue Boost TID w/ thickener  3)  If enteral nutrition continues, continue current TF regimen as tolerated, Novasource Renal @ 45 mL/hr 2160 kcal, 98g PRO, and 774ml FF- FWF per MD   - monitor for s/s of intolerance such as residuals >500ml, n/v/d, or abdominal distension  - will monitor need to decrease TF rate if PO intake improves  4) RD following      Goals: Meet % of EEN/EPN by RD f/u date  Nutrition Goal Status: progressing   Communication of RD Recs:  (POC)

## 2024-01-11 NOTE — ASSESSMENT & PLAN NOTE
BG goal 140-180. TF @ 45 ml/hr.     - Novolog 6 units during 0800, 1200, and 1600 BG checks to cover additional intake. -hold if TF held or BG < 100   - Novolog 4 units 2000, 0000, 0400 to cover TF- hold if TF held or BG < 100   - LDC (150/50) prn q4hr   - POCT Glucose every 4 hours  - Hypoglycemia protocol in place      ** Please notify Endocrine for any change and/or advance in diet**  ** Please call Endocrine for any BG related issues **     Discharge Planning:   TBD. Please notify endocrinology prior to discharge.

## 2024-01-11 NOTE — PROGRESS NOTES
01/11/2024  Deni Frye    Current provider:  Sandhya Price MD    Device interrogation:      1/11/2024    11:56 AM 1/11/2024     8:02 AM 1/11/2024     3:52 AM 1/10/2024    11:58 PM 1/10/2024     8:02 PM 1/10/2024     4:08 PM 1/10/2024     1:00 PM   TXP LVAD INTERROGATIONS   Type HeartMate3 HeartMate3 HeartMate3 HeartMate3 HeartMate3 HeartMate3 HeartMate3   Flow 4.2 3.8 3.9 4.1 4.2 3.9 4.1   Speed 4900 4900 4900 4900 4900 4900 4900   PI 3.7 4.9 4.6 4.1 3.3 5.1 4.9   Power ([place]) 3.4 3.3 3.3 3.3 3.4 3.3 3.2   LSL 4500 4500 4500 4500 4500 4500 4500   Pulsatility     Intermittent pulse Intermittent pulse Intermittent pulse          Rounded on [facility] to ensure all mechanical assist device settings (IABP or VAD) were appropriate and all parameters were within limits.  I was able to ensure all back up equipment was present, the staff had no issues, and the Perfusion Department daily rounding was complete.      For implantable VADs: Interrogation of Ventricular assist device was performed with analysis of device parameters and review of device function. I have personally reviewed the interrogation findings and agree with findings as stated.     In emergency, the nursing units have been notified to contact the perfusion department either by:  Calling [phone] from 630am to 4pm Mon thru Fri, utilizing the On-Call Finder functionality of Epic and searching for Perfusion, or by contacting the hospital  from 4pm to 630am and on weekends and asking to speak with the perfusionist on call.    1:56 PM

## 2024-01-11 NOTE — SUBJECTIVE & OBJECTIVE
Interval History: No acute complaints. Received 1 unit of pRBC with HD yesterday. H/H improved.     Continuous Infusions:   sodium chloride 0.9%      dextrose 10 % in water (D10W)       Scheduled Meds:   acetaminophen  1,000 mg Per NG tube TID    alteplase  2 mg Intra-Catheter Once    amiodarone  400 mg Per NG tube Daily    balsam peru-castor oiL   Topical (Top) BID    cholecalciferol (vitamin D3)  2,000 Units Per NG tube Daily    epoetin zohaib (PROCRIT) injection  50 Units/kg Subcutaneous Every Tues, Thurs, Sat    famotidine  20 mg Per G Tube Daily    gabapentin  125 mg Per NG tube Q12H    insulin aspart U-100  4 Units Subcutaneous Q24H    insulin aspart U-100  4 Units Subcutaneous Q24H    insulin aspart U-100  4 Units Subcutaneous Q24H    insulin aspart U-100  6 Units Subcutaneous Q24H    insulin aspart U-100  6 Units Subcutaneous Q24H    insulin aspart U-100  6 Units Subcutaneous Q24H    oseltamivir  30 mg Oral Every Tues, Thurs, Sat    polyethylene glycol  17 g Per NG tube Daily    psyllium husk (aspartame)  1 packet Per NG tube Daily    senna-docusate 8.6-50 mg  2 tablet Per NG tube Daily    traZODone  50 mg Per NG tube QHS    venlafaxine  37.5 mg Per G Tube Daily    [START ON 1/12/2024] warfarin  2.5 mg Oral Every Mon, Wed, Fri    warfarin  5 mg Oral Every Tues, Thurs, Sat, Sun     PRN Meds:0.9%  NaCl infusion (for blood administration), bisacodyL, dextrose 10 % in water (D10W), dextrose 10%, dextrose 10%, dextrose 10%, dextrose 10%, glucagon (human recombinant), heparin (porcine), hydrALAZINE, insulin aspart U-100, ondansetron, Flushing PICC/Midline Protocol **AND** [DISCONTINUED] sodium chloride 0.9% **AND** sodium chloride 0.9%, traMADoL, zolpidem    Review of patient's allergies indicates:  No Known Allergies  Objective:     Vital Signs (Most Recent):  Temp: 98.1 °F (36.7 °C) (01/11/24 1114)  Pulse: 98 (01/11/24 1200)  Resp: 18 (01/11/24 1114)  BP: (!) 76/0 (01/11/24 1155)  SpO2: 97 % (01/11/24 1114) Vital  Signs (24h Range):  Temp:  [97.7 °F (36.5 °C)-98.4 °F (36.9 °C)] 98.1 °F (36.7 °C)  Pulse:  [] 98  Resp:  [17-18] 18  SpO2:  [97 %-100 %] 97 %  BP: (74-82)/(0) 76/0     Patient Vitals for the past 72 hrs (Last 3 readings):   Weight   01/10/24 0756 69.9 kg (154 lb)   01/09/24 0743 68.9 kg (152 lb)   01/08/24 1401 68 kg (150 lb)       Body mass index is 20.89 kg/m².      Intake/Output Summary (Last 24 hours) at 1/11/2024 1234  Last data filed at 1/11/2024 1200  Gross per 24 hour   Intake 1114 ml   Output 550 ml   Net 564 ml         Hemodynamic Parameters:       Telemetry: SR       Physical Exam  Constitutional:       Comments: Cachectic, temporal wasting   HENT:      Head: Normocephalic and atraumatic.   Eyes:      Conjunctiva/sclera: Conjunctivae normal.      Pupils: Pupils are equal, round, and reactive to light.   Neck:      Comments: Do not appreciate elevated JVP  Cardiovascular:      Rate and Rhythm: Normal rate and regular rhythm.      Comments: Smooth VAD hum  Pulmonary:      Effort: Pulmonary effort is normal.      Breath sounds: Normal breath sounds.   Abdominal:      General: Bowel sounds are normal.      Palpations: Abdomen is soft.   Musculoskeletal:         General: No swelling. Normal range of motion.      Cervical back: Normal range of motion and neck supple.   Skin:     General: Skin is warm and dry.      Capillary Refill: Capillary refill takes 2 to 3 seconds.   Neurological:      General: No focal deficit present.      Mental Status: He is alert and oriented to person, place, and time.            Significant Labs:  CBC:  Recent Labs   Lab 01/09/24  0717 01/10/24  0338 01/11/24  0351   WBC 8.28 8.29 8.96   RBC 2.31* 2.29* 2.76*   HGB 6.5* 6.4* 7.8*   HCT 21.2* 21.0* 25.7*    452* 430   MCV 92 92 93   MCH 28.1 27.9 28.3   MCHC 30.7* 30.5* 30.4*       BNP:  Recent Labs   Lab 01/05/24  1059 01/08/24  0433 01/10/24  0338   * 652* 683*       CMP:  Recent Labs   Lab 01/09/24  0454  01/10/24  0337 01/11/24  0351   * 129* 113*   CALCIUM 8.6* 8.8 8.5*   ALBUMIN 1.9* 1.9* 2.0*   PROT 6.9 7.0 7.2   * 133* 136   K 3.8 3.5 3.4*   CO2 21* 21* 25   CL 99 98 99   * 107* 60*   CREATININE 5.8* 6.0* 3.4*   ALKPHOS 156* 164* 160*   ALT 26 26 26   AST 32 29 30   BILITOT 0.3 0.3 0.3        Coagulation:   Recent Labs   Lab 01/09/24  0840 01/10/24  0337 01/11/24  0351   INR 3.6* 3.8* 2.4*       LDH:  Recent Labs   Lab 01/09/24  0454 01/10/24  0337 01/11/24  0351   * 330* 329*       Microbiology:  Microbiology Results (last 7 days)       Procedure Component Value Units Date/Time    Fungus culture [[id number]] Collected: 12/06/23 1532    Order Status: Completed Specimen: Body Fluid from Lung, Right Updated: 01/09/24 1154     Fungus (Mycology) Culture No fungus isolated after 4 weeks    Narrative:      Bronchial Wash    Throat culture [[id number]]  (Abnormal)  (Susceptibility) Collected: 01/04/24 1942    Order Status: Completed Specimen: Throat Updated: 01/08/24 1348     RESPIRATORY CULTURE - THROAT PSEUDOMONAS AERUGINOSA   Many  Normal respiratory bobby also present      Influenza A & B by Molecular [[id number]] Collected: 01/05/24 1607    Order Status: Completed Specimen: Nasopharyngeal Swab Updated: 01/05/24 1647     Influenza A, Molecular Negative     Influenza B, Molecular Negative     Flu A & B Source NP    Group A Strep, Molecular [[id number]] Collected: 01/05/24 1004    Order Status: Completed Specimen: Throat Updated: 01/05/24 1039     Group A Strep, Molecular Negative     Comment: Arcanobacterium haemolyticum and Beta Streptococcus group C   and G will not be detected by this test method.  Please order   Throat Culture ([id number]) if suspected.         Respiratory Infection Panel (PCR), Nasopharyngeal [[id number]] Collected: 01/04/24 1942    Order Status: Completed Specimen: Nasopharyngeal Swab Updated: 01/04/24 2101     Respiratory Infection Panel Source NP Swab     Adenovirus  Not Detected     Coronavirus 229E, Common Cold Virus Not Detected     Coronavirus HKU1, Common Cold Virus Not Detected     Coronavirus NL63, Common Cold Virus Not Detected     Coronavirus OC43, Common Cold Virus Not Detected     Comment: The Coronavirus strains detected in this test cause the common cold.  These strains are not the COVID-19 (novel Coronavirus)strain   associated with the respiratory disease outbreak.          SARS-CoV2 (COVID-19) Qualitative PCR Not Detected     Human Metapneumovirus Not Detected     Human Rhinovirus/Enterovirus Not Detected     Influenza A (subtypes H1, H1-2009,H3) Not Detected     Influenza B Not Detected     Parainfluenza Virus 1 Not Detected     Parainfluenza Virus 2 Not Detected     Parainfluenza Virus 3 Not Detected     Parainfluenza Virus 4 Not Detected     Respiratory Syncytial Virus Not Detected     Bordetella Parapertussis (QV4005) Not Detected     Bordetella pertussis (ptxP) Not Detected     Chlamydia pneumoniae Not Detected     Mycoplasma pneumoniae Not Detected    Narrative:      For all other respiratory sources, order [id number] -  Respiratory Viral Panel by PCR            I have reviewed all pertinent labs within the past 24 hours.    Estimated Creatinine Clearance: 22.6 mL/min (A) (based on SCr of 3.4 mg/dL (H)).    Diagnostic Results:  I have reviewed and interpreted all pertinent imaging results/findings within the past 24 hours.

## 2024-01-11 NOTE — CONSULTS
[facility] - Cardiology Stepdown  Adult Nutrition  Consult Note    SUMMARY     Recommendations  1) Continue current minced and moist diet as tolerated per SLP recs with honey-thick liquids, encourage PO intake as needed   2) Continue Boost TID w/ thickener  3)  If enteral nutrition continues, continue current TF regimen as tolerated, Novasource Renal @ 45 mL/hr 2160 kcal, 98g PRO, and 774ml FF- FWF per MD   - monitor for s/s of intolerance such as residuals >500ml, n/v/d, or abdominal distension  - will monitor need to decrease TF rate if PO intake improves  4) RD following     Goals: Meet % of EEN/EPN by RD f/u date  Nutrition Goal Status: progressing   Communication of RD Recs:  (POC)    Assessment and Plan    Endocrine  Moderate malnutrition  Malnutrition Type:  Context: acute illness or injury  Level: moderate    Related to (etiology):   Inadequate nutrition intake    Signs and Symptoms (as evidenced by):   Nutritional intake <75% x 7days, observed orbital wasting and trace edema present.     Malnutrition Characteristic Summary:  Energy Intake (Malnutrition): less than 75% for greater than 7 days  Fluid Accumulation (Malnutrition):  (Trace edema)      Interventions/Recommendations (treatment strategy):  Collaboration of nutritional care with other providers.  EN    Nutrition Diagnosis Status:   Continues       Malnutrition Assessment  Malnutrition Context: acute illness or injury  Malnutrition Level: moderate          Energy Intake (Malnutrition): less than 75% for greater than 7 days  Fluid Accumulation (Malnutrition):  (Trace edema)   Orbital Region (Subcutaneous Fat Loss): mild depletion   Restorationist Region (Muscle Loss): moderate depletion          Reason for Assessment    Reason For Assessment: RD follow-up, consult  Diagnosis: cardiac disease (CHF/ s/p LVAD on 12/1)  Relevant Medical History: CAD, DMT2, CHF, PAF  Interdisciplinary Rounds: attended  General Information Comments: RD consulted for  supratherapeutic INR. Pt is also a follow-up. Spoke w/ pt and pt's caregiver at bedside, pt reports not consuming the minced and moist diet provided- pt's caregiver is bringing outside foods such as red beans and rice from [employer]. Discussed Boost and the importance of consuming w/ meals to get pt's nutrition up. Pt's reports not liking the Boost w/ thickener. Discussed that TF will be weaned off as pt consumes more po intake % meal consumption. Asked if pt and pt's caregiver had any additional questions- no further questions noted at this time. LBM noted-1/11/24  Nutrition Discharge Planning: Vitamin K education provided 1/11. Discussed remaining consistent w/ foods that contain Vitamin K- green leafy vegetables. Handouts were provided to pt/pt caregiver- no additional questions noted at this time.    Nutrition Risk Screen    Nutrition Risk Screen: difficulty chewing/swallowing    Nutrition/Diet History    Spiritual, Cultural Beliefs, Orthodox Practices, Values that Affect Care: no  Food Allergies: NKFA    Anthropometrics    Temp: 98.1 °F (36.7 °C)  Height Method: Stated  Height: 6' (182.9 cm)  Height (inches): 72 in  Weight Method: Standard Scale  Weight: 70.7 kg (155 lb 13.8 oz)  Weight (lb): 155.87 lb  Ideal Body Weight (IBW), Male: 178 lb  % Ideal Body Weight, Male (lb): 101.69 %  BMI (Calculated): 21.1  BMI Grade: 18.5-24.9 - normal       Lab/Procedures/Meds    Pertinent Labs Reviewed: reviewed  Pertinent Labs Comments: Hgb: 6.5, Hct: 19.8, Na: 134, BUN: 74, Creatinine: 4.4, eGFR: 14.5, Glucose: 162, Calcium: 8.5, Phosphorus: 1.9, Albumin: 1.9  Pertinent Medications Reviewed: reviewed  Pertinent Medications Comments: Famotidine, gabapentin, insulin, polyethylene glycol, psyllium husk, warfarin, senna-doc    Estimated/Assessed Needs    Weight Used For Calorie Calculations: 81 kg (178 lb 9.2 oz) (IBW d/t edema present)  Energy Calorie Requirements (kcal): 2025- 2430 kcal  Energy Need Method: Kcal/kg  (25-30 kcal/ kg of IBW)  Protein Requirements: 97- 122g (1.2-1.5g/kg of IBW)  Weight Used For Protein Calculations: 81 kg (178 lb 9.2 oz) (IBW d/t edema present.)  Fluid Requirements (mL): 1 ml/kcal or per MD     RDA Method (mL): 2025       Nutrition Prescription Ordered    Current Diet Order: Minced and moist  Nutrition Order Comments: Beneprotein BID via NGT  Current Nutrition Support Formula Ordered: Novasource Renal  Current Nutrition Support Rate Ordered: 45 (ml)  Current Nutrition Support Frequency Ordered: ml/hr x 24hrs    Evaluation of Received Nutrient/Fluid Intake    Total Protein (gm): 110 (beneprotein BID)  % Protein Needs: 100  I/O: + 3.5 L since 12/28  Energy Calories Required: meeting needs  Protein Required: meeting needs  Fluid Required:  (As per MD)  Pt is receiving 2160 kcals, 98 g of protein, and 774 ml of fluid from TF provided.   Total Fluid Intake (mL/kg): 1 ml/kcal or per MD  Comments: LBM 1/1  Tolerance: tolerating  % Intake of Estimated Energy Needs: 75 - 100 %  % Meal Intake: 25 - 50 %    Nutrition Risk    Level of Risk/Frequency of Follow-up:  (Follow-up 1-2x/week)     Monitor and Evaluation    Food and Nutrient Intake: energy intake, food and beverage intake, enteral nutrition intake, parenteral nutrition intake  Food and Nutrient Adminstration: diet order, enteral and parenteral nutrition administration  Knowledge/Beliefs/Attitudes: food and nutrition knowledge/skill, beliefs and attitudes  Physical Activity and Function: nutrition-related ADLs and IADLs, factors affecting access to physical activity  Anthropometric Measurements: height/length, weight, weight change, body mass index, growth pattern indices/percentile ranks  Biochemical Data, Medical Tests and Procedures: electrolyte and renal panel, gastrointestinal profile, glucose/endocrine profile, inflammatory profile, lipid profile  Nutrition-Focused Physical Findings: overall appearance, extremities, muscles and bones, head and  eyes, skin       Nutrition Follow-Up    RD Follow-up?: Yes

## 2024-01-11 NOTE — PROGRESS NOTES
[facility] - Cardiology Stepdown  Endocrinology  Progress Note    Admit Date: 11/9/2023     Reason for Consult: Management of T2DM, Hyperglycemia     Surgical Procedure and Date: n/a    Diabetes diagnosis year: 1998    Home Diabetes Medications:  Metformin (off since October)   Lab Results   Component Value Date    HGBA1C 7.6 (H) 10/12/2023       How often checking glucose at home?  Once daily in the AM    BG readings on regimen: 150-160s  Hypoglycemia on the regimen?  No  Missed doses on regimen?  n/a    Diabetes Complications include:     Hyperglycemia    Complicating diabetes co morbidities:   CAD s/p CABG, HTN, HLD      HPI:   Patient is a 63 y.o. male with a diagnosis of CAD s/p 3v CABG (unclear anatomy, 2009), ICM with a recent EF of 15-20% s/p ICD (medtronik 2009), DM2 (a1c 7.7), HTN, HLD, Vfib on amio who presents to the ED with CC of SOB. In the ED he was AF with stable VS on RA.  CBC showing chronic anemia.  CMP notable for acute on chronic CKD with baseline around 2.1 and Cr now 2.6.  BNP elevated.  Lactate neg.  CXR showing pulmonary edema. He was subsequently admitted to the CCU for diuresis.  Endocrinology consulted for management of T2DM.          Interval HPI:   Overnight events: No acute events overnight. Patient in room 312/312 A. Blood glucose stable. BG at goal on current insulin regimen (Schedule Insulin and SSI (TPN/TF)). Steroid use- None. 38 Days Post-Op  Renal function- Abnormal - Creatinine 3.4   Vasopressors-  None       Endocrine will continue to follow and manage insulin orders inpatient.         Diet Minced & Moist (IDDSI Level 5) Honey Thick (Moderately Thick)     Eating:   <25%  Nausea: No  Hypoglycemia and intervention: No  Fever: No  TPN and/or TF: Yes  If yes, type of TF/TPN and rate: TF @ 45 ml/hr    BP (!) 80/0 (BP Location: Left arm, Patient Position: Lying)   Pulse 98   Temp 98.3 °F (36.8 °C) (Oral)   Resp 18   Ht 6' (1.829 m)   Wt 69.9 kg (154 lb)   SpO2 97%   BMI 20.89  "kg/m²     Labs Reviewed and Include    Recent Labs   Lab 01/11/24  0351   *   CALCIUM 8.5*   ALBUMIN 2.0*   PROT 7.2      K 3.4*   CO2 25   CL 99   BUN 60*   CREATININE 3.4*   ALKPHOS 160*   ALT 26   AST 30   BILITOT 0.3     Lab Results   Component Value Date    WBC 8.96 01/11/2024    HGB 7.8 (L) 01/11/2024    HCT 25.7 (L) 01/11/2024    MCV 93 01/11/2024     01/11/2024     No results for input(s): "TSH", "FREET4" in the last 168 hours.  Lab Results   Component Value Date    HGBA1C 7.6 (H) 10/12/2023       Nutritional status:   Body mass index is 20.89 kg/m².  Lab Results   Component Value Date    ALBUMIN 2.0 (L) 01/11/2024    ALBUMIN 1.9 (L) 01/10/2024    ALBUMIN 1.9 (L) 01/09/2024     Lab Results   Component Value Date    PREALBUMIN 36 01/10/2024    PREALBUMIN 26 01/08/2024    PREALBUMIN 35 01/05/2024       Estimated Creatinine Clearance: 22.6 mL/min (A) (based on SCr of 3.4 mg/dL (H)).    Accu-Checks  Recent Labs     01/09/24  1643 01/09/24  2037 01/10/24  0004 01/10/24  0336 01/10/24  0929 01/10/24  1259 01/10/24  1603 01/10/24  2009 01/11/24  0003 01/11/24  0351   POCTGLUCOSE 175* 131* 133* 135* 158* 117* 126* 166* 128* 128*       Current Medications and/or Treatments Impacting Glycemic Control  Immunotherapy:    Immunosuppressants       None          Steroids:   Hormones (From admission, onward)      None          Pressors:    Autonomic Drugs (From admission, onward)      None          Hyperglycemia/Diabetes Medications:   Antihyperglycemics (From admission, onward)      Start     Stop Route Frequency Ordered    01/05/24 0800  insulin aspart U-100 pen 6 Units         -- SubQ Every 24 hours (non-standard times) 01/04/24 1214    01/05/24 0400  insulin aspart U-100 pen 4 Units         -- SubQ Every 24 hours (non-standard times) 01/04/24 1214    01/05/24 0000  insulin aspart U-100 pen 4 Units         -- SubQ Every 24 hours (non-standard times) 01/04/24 1214 01/04/24 2000  insulin aspart U-100 " pen 4 Units         -- SubQ Every 24 hours (non-standard times) 01/04/24 1214    01/04/24 1600  insulin aspart U-100 pen 6 Units         -- SubQ Every 24 hours (non-standard times) 01/04/24 1214    01/04/24 1213  insulin aspart U-100 pen 6 Units         -- SubQ Every 24 hours (non-standard times) 01/04/24 1214    12/31/23 1108  insulin aspart U-100 pen 0-5 Units         -- SubQ Every 4 hours PRN 12/31/23 1008              ASSESSMENT and PLAN    Cardiac/Vascular  * LVAD (left ventricular assist device) present  Optimize BG control to improve wound healing        PAF (paroxysmal atrial fibrillation)  May increase insulin resistance.         Acute on chronic combined systolic and diastolic CHF, NYHA class 4  Managed per primary team  Optimize BG control  S/p LVAD     Renal/  Acute renal failure with acute tubular necrosis superimposed on stage 3b chronic kidney disease  Lab Results   Component Value Date    CREATININE 3.4 (H) 01/11/2024     Avoid insulin stacking  Titrate insulin slowly       Endocrine  Type 2 diabetes mellitus without complication, without long-term current use of insulin  BG goal 140-180. TF @ 45 ml/hr.     - Novolog 6 units during 0800, 1200, and 1600 BG checks to cover additional intake. -hold if TF held or BG < 100   - Novolog 4 units 2000, 0000, 0400 to cover TF- hold if TF held or BG < 100   - LDC (150/50) prn q4hr   - POCT Glucose every 4 hours  - Hypoglycemia protocol in place      ** Please notify Endocrine for any change and/or advance in diet**  ** Please call Endocrine for any BG related issues **     Discharge Planning:   TBD. Please notify endocrinology prior to discharge.                  Erasmo Parrish, DNP, FNP  Endocrinology  [facility] - Cardiology Stepdown

## 2024-01-11 NOTE — SUBJECTIVE & OBJECTIVE
"Interval HPI:   Overnight events: No acute events overnight. Patient in room 312/312 A. Blood glucose stable. BG at goal on current insulin regimen (Schedule Insulin and SSI (TPN/TF)). Steroid use- None. 38 Days Post-Op  Renal function- Abnormal - Creatinine 3.4   Vasopressors-  None       Endocrine will continue to follow and manage insulin orders inpatient.         Diet Minced & Moist (IDDSI Level 5) Honey Thick (Moderately Thick)     Eating:   <25%  Nausea: No  Hypoglycemia and intervention: No  Fever: No  TPN and/or TF: Yes  If yes, type of TF/TPN and rate: TF @ 45 ml/hr    BP (!) 80/0 (BP Location: Left arm, Patient Position: Lying)   Pulse 98   Temp 98.3 °F (36.8 °C) (Oral)   Resp 18   Ht 6' (1.829 m)   Wt 69.9 kg (154 lb)   SpO2 97%   BMI 20.89 kg/m²     Labs Reviewed and Include    Recent Labs   Lab 01/11/24  0351   *   CALCIUM 8.5*   ALBUMIN 2.0*   PROT 7.2      K 3.4*   CO2 25   CL 99   BUN 60*   CREATININE 3.4*   ALKPHOS 160*   ALT 26   AST 30   BILITOT 0.3     Lab Results   Component Value Date    WBC 8.96 01/11/2024    HGB 7.8 (L) 01/11/2024    HCT 25.7 (L) 01/11/2024    MCV 93 01/11/2024     01/11/2024     No results for input(s): "TSH", "FREET4" in the last 168 hours.  Lab Results   Component Value Date    HGBA1C 7.6 (H) 10/12/2023       Nutritional status:   Body mass index is 20.89 kg/m².  Lab Results   Component Value Date    ALBUMIN 2.0 (L) 01/11/2024    ALBUMIN 1.9 (L) 01/10/2024    ALBUMIN 1.9 (L) 01/09/2024     Lab Results   Component Value Date    PREALBUMIN 36 01/10/2024    PREALBUMIN 26 01/08/2024    PREALBUMIN 35 01/05/2024       Estimated Creatinine Clearance: 22.6 mL/min (A) (based on SCr of 3.4 mg/dL (H)).    Accu-Checks  Recent Labs     01/09/24  1643 01/09/24  2037 01/10/24  0004 01/10/24  0336 01/10/24  0929 01/10/24  1259 01/10/24  1603 01/10/24  2009 01/11/24  0003 01/11/24  0351   POCTGLUCOSE 175* 131* 133* 135* 158* 117* 126* 166* 128* 128*       Current " Medications and/or Treatments Impacting Glycemic Control  Immunotherapy:    Immunosuppressants       None          Steroids:   Hormones (From admission, onward)      None          Pressors:    Autonomic Drugs (From admission, onward)      None          Hyperglycemia/Diabetes Medications:   Antihyperglycemics (From admission, onward)      Start     Stop Route Frequency Ordered    01/05/24 0800  insulin aspart U-100 pen 6 Units         -- SubQ Every 24 hours (non-standard times) 01/04/24 1214    01/05/24 0400  insulin aspart U-100 pen 4 Units         -- SubQ Every 24 hours (non-standard times) 01/04/24 1214    01/05/24 0000  insulin aspart U-100 pen 4 Units         -- SubQ Every 24 hours (non-standard times) 01/04/24 1214    01/04/24 2000  insulin aspart U-100 pen 4 Units         -- SubQ Every 24 hours (non-standard times) 01/04/24 1214    01/04/24 1600  insulin aspart U-100 pen 6 Units         -- SubQ Every 24 hours (non-standard times) 01/04/24 1214    01/04/24 1213  insulin aspart U-100 pen 6 Units         -- SubQ Every 24 hours (non-standard times) 01/04/24 1214    12/31/23 1108  insulin aspart U-100 pen 0-5 Units         -- SubQ Every 4 hours PRN 12/31/23 1008

## 2024-01-12 LAB
ALBUMIN SERPL BCP-MCNC: 1.9 G/DL (ref 3.5–5.2)
ALP SERPL-CCNC: 147 U/L (ref 55–135)
ALT SERPL W/O P-5'-P-CCNC: 22 U/L (ref 10–44)
ANION GAP SERPL CALC-SCNC: 10 MMOL/L (ref 8–16)
AST SERPL-CCNC: 25 U/L (ref 10–40)
BASOPHILS # BLD AUTO: 0.05 K/UL (ref 0–0.2)
BASOPHILS NFR BLD: 0.5 % (ref 0–1.9)
BILIRUB DIRECT SERPL-MCNC: 0.2 MG/DL (ref 0.1–0.3)
BILIRUB SERPL-MCNC: 0.2 MG/DL (ref 0.1–1)
BNP SERPL-MCNC: 830 PG/ML (ref 0–99)
BUN SERPL-MCNC: 74 MG/DL (ref 8–23)
CALCIUM SERPL-MCNC: 8.8 MG/DL (ref 8.7–10.5)
CHLORIDE SERPL-SCNC: 100 MMOL/L (ref 95–110)
CO2 SERPL-SCNC: 24 MMOL/L (ref 23–29)
CREAT SERPL-MCNC: 4.2 MG/DL (ref 0.5–1.4)
CRP SERPL-MCNC: 101.4 MG/L (ref 0–8.2)
DIFFERENTIAL METHOD BLD: ABNORMAL
EOSINOPHIL # BLD AUTO: 0.2 K/UL (ref 0–0.5)
EOSINOPHIL NFR BLD: 1.9 % (ref 0–8)
ERYTHROCYTE [DISTWIDTH] IN BLOOD BY AUTOMATED COUNT: 17.3 % (ref 11.5–14.5)
EST. GFR  (NO RACE VARIABLE): 15.3 ML/MIN/1.73 M^2
GLUCOSE SERPL-MCNC: 152 MG/DL (ref 70–110)
HCT VFR BLD AUTO: 24.4 % (ref 40–54)
HGB BLD-MCNC: 7.5 G/DL (ref 14–18)
IMM GRANULOCYTES # BLD AUTO: 0.06 K/UL (ref 0–0.04)
IMM GRANULOCYTES NFR BLD AUTO: 0.6 % (ref 0–0.5)
INR PPP: 1.8 (ref 0.8–1.2)
LDH SERPL L TO P-CCNC: 295 U/L (ref 110–260)
LYMPHOCYTES # BLD AUTO: 0.7 K/UL (ref 1–4.8)
LYMPHOCYTES NFR BLD: 7.7 % (ref 18–48)
MAGNESIUM SERPL-MCNC: 2.1 MG/DL (ref 1.6–2.6)
MCH RBC QN AUTO: 28.4 PG (ref 27–31)
MCHC RBC AUTO-ENTMCNC: 30.7 G/DL (ref 32–36)
MCV RBC AUTO: 92 FL (ref 82–98)
MONOCYTES # BLD AUTO: 0.9 K/UL (ref 0.3–1)
MONOCYTES NFR BLD: 9.2 % (ref 4–15)
NEUTROPHILS # BLD AUTO: 7.7 K/UL (ref 1.8–7.7)
NEUTROPHILS NFR BLD: 80.1 % (ref 38–73)
NRBC BLD-RTO: 0 /100 WBC
PHOSPHATE SERPL-MCNC: 2.9 MG/DL (ref 2.7–4.5)
PLATELET # BLD AUTO: 425 K/UL (ref 150–450)
PMV BLD AUTO: 9.7 FL (ref 9.2–12.9)
POCT GLUCOSE: 127 MG/DL (ref 70–110)
POCT GLUCOSE: 144 MG/DL (ref 70–110)
POCT GLUCOSE: 148 MG/DL (ref 70–110)
POCT GLUCOSE: 165 MG/DL (ref 70–110)
POCT GLUCOSE: 165 MG/DL (ref 70–110)
POCT GLUCOSE: 169 MG/DL (ref 70–110)
POCT GLUCOSE: 183 MG/DL (ref 70–110)
POTASSIUM SERPL-SCNC: 3.5 MMOL/L (ref 3.5–5.1)
PREALB SERPL-MCNC: 35 MG/DL (ref 20–43)
PROT SERPL-MCNC: 7 G/DL (ref 6–8.4)
PROTHROMBIN TIME: 18.9 SEC (ref 9–12.5)
RBC # BLD AUTO: 2.64 M/UL (ref 4.6–6.2)
SODIUM SERPL-SCNC: 134 MMOL/L (ref 136–145)
WBC # BLD AUTO: 9.64 K/UL (ref 3.9–12.7)

## 2024-01-12 PROCEDURE — 85025 COMPLETE CBC W/AUTO DIFF WBC: CPT | Performed by: PHYSICIAN ASSISTANT

## 2024-01-12 PROCEDURE — 80048 BASIC METABOLIC PNL TOTAL CA: CPT | Performed by: INTERNAL MEDICINE

## 2024-01-12 PROCEDURE — 25000003 PHARM REV CODE 250: Performed by: INTERNAL MEDICINE

## 2024-01-12 PROCEDURE — 25000003 PHARM REV CODE 250: Performed by: STUDENT IN AN ORGANIZED HEALTH CARE EDUCATION/TRAINING PROGRAM

## 2024-01-12 PROCEDURE — 80076 HEPATIC FUNCTION PANEL: CPT | Performed by: INTERNAL MEDICINE

## 2024-01-12 PROCEDURE — 93750 INTERROGATION VAD IN PERSON: CPT | Mod: ,,, | Performed by: INTERNAL MEDICINE

## 2024-01-12 PROCEDURE — 99233 SBSQ HOSP IP/OBS HIGH 50: CPT | Mod: ,,, | Performed by: INTERNAL MEDICINE

## 2024-01-12 PROCEDURE — 99232 SBSQ HOSP IP/OBS MODERATE 35: CPT | Mod: ,,, | Performed by: NURSE PRACTITIONER

## 2024-01-12 PROCEDURE — 25000003 PHARM REV CODE 250: Performed by: PHYSICIAN ASSISTANT

## 2024-01-12 PROCEDURE — 97116 GAIT TRAINING THERAPY: CPT

## 2024-01-12 PROCEDURE — 25000003 PHARM REV CODE 250: Performed by: NURSE PRACTITIONER

## 2024-01-12 PROCEDURE — 97535 SELF CARE MNGMENT TRAINING: CPT

## 2024-01-12 PROCEDURE — 99233 SBSQ HOSP IP/OBS HIGH 50: CPT | Mod: ,,, | Performed by: PHYSICIAN ASSISTANT

## 2024-01-12 PROCEDURE — 83880 ASSAY OF NATRIURETIC PEPTIDE: CPT | Performed by: STUDENT IN AN ORGANIZED HEALTH CARE EDUCATION/TRAINING PROGRAM

## 2024-01-12 PROCEDURE — 36415 COLL VENOUS BLD VENIPUNCTURE: CPT | Performed by: NURSE PRACTITIONER

## 2024-01-12 PROCEDURE — 84100 ASSAY OF PHOSPHORUS: CPT | Performed by: INTERNAL MEDICINE

## 2024-01-12 PROCEDURE — 86140 C-REACTIVE PROTEIN: CPT | Performed by: STUDENT IN AN ORGANIZED HEALTH CARE EDUCATION/TRAINING PROGRAM

## 2024-01-12 PROCEDURE — 25000003 PHARM REV CODE 250

## 2024-01-12 PROCEDURE — 97530 THERAPEUTIC ACTIVITIES: CPT

## 2024-01-12 PROCEDURE — 83735 ASSAY OF MAGNESIUM: CPT | Performed by: INTERNAL MEDICINE

## 2024-01-12 PROCEDURE — 27000248 HC VAD-ADDITIONAL DAY

## 2024-01-12 PROCEDURE — 83615 LACTATE (LD) (LDH) ENZYME: CPT | Performed by: STUDENT IN AN ORGANIZED HEALTH CARE EDUCATION/TRAINING PROGRAM

## 2024-01-12 PROCEDURE — 27000207 HC ISOLATION

## 2024-01-12 PROCEDURE — 85610 PROTHROMBIN TIME: CPT | Performed by: PHYSICIAN ASSISTANT

## 2024-01-12 PROCEDURE — 84134 ASSAY OF PREALBUMIN: CPT | Performed by: NURSE PRACTITIONER

## 2024-01-12 PROCEDURE — 97110 THERAPEUTIC EXERCISES: CPT

## 2024-01-12 PROCEDURE — 20600001 HC STEP DOWN PRIVATE ROOM

## 2024-01-12 RX ORDER — GABAPENTIN 300 MG/1
300 CAPSULE ORAL 2 TIMES DAILY
Status: DISCONTINUED | OUTPATIENT
Start: 2024-01-12 | End: 2024-01-12

## 2024-01-12 RX ORDER — WARFARIN SODIUM 5 MG/1
5 TABLET ORAL DAILY
Status: DISCONTINUED | OUTPATIENT
Start: 2024-01-12 | End: 2024-01-16

## 2024-01-12 RX ORDER — GABAPENTIN 100 MG/1
100 CAPSULE ORAL 2 TIMES DAILY
Status: DISCONTINUED | OUTPATIENT
Start: 2024-01-12 | End: 2024-01-24 | Stop reason: HOSPADM

## 2024-01-12 RX ADMIN — ACETAMINOPHEN 1000 MG: 500 TABLET ORAL at 04:01

## 2024-01-12 RX ADMIN — ACETAMINOPHEN 1000 MG: 500 TABLET ORAL at 10:01

## 2024-01-12 RX ADMIN — INSULIN ASPART 6 UNITS: 100 INJECTION, SOLUTION INTRAVENOUS; SUBCUTANEOUS at 04:01

## 2024-01-12 RX ADMIN — MIRTAZAPINE 7.5 MG: 7.5 TABLET, FILM COATED ORAL at 10:01

## 2024-01-12 RX ADMIN — TRAZODONE HYDROCHLORIDE 50 MG: 50 TABLET ORAL at 10:01

## 2024-01-12 RX ADMIN — VENLAFAXINE 37.5 MG: 37.5 TABLET ORAL at 09:01

## 2024-01-12 RX ADMIN — Medication: at 10:01

## 2024-01-12 RX ADMIN — GABAPENTIN 100 MG: 100 CAPSULE ORAL at 11:01

## 2024-01-12 RX ADMIN — Medication 2000 UNITS: at 09:01

## 2024-01-12 RX ADMIN — TRAMADOL HYDROCHLORIDE 50 MG: 50 TABLET, COATED ORAL at 05:01

## 2024-01-12 RX ADMIN — ACETAMINOPHEN 1000 MG: 500 TABLET ORAL at 09:01

## 2024-01-12 RX ADMIN — AMIODARONE HYDROCHLORIDE 400 MG: 200 TABLET ORAL at 09:01

## 2024-01-12 RX ADMIN — SENNOSIDES AND DOCUSATE SODIUM 2 TABLET: 8.6; 5 TABLET ORAL at 09:01

## 2024-01-12 RX ADMIN — INSULIN ASPART 4 UNITS: 100 INJECTION, SOLUTION INTRAVENOUS; SUBCUTANEOUS at 03:01

## 2024-01-12 RX ADMIN — FAMOTIDINE 20 MG: 20 TABLET, FILM COATED ORAL at 09:01

## 2024-01-12 RX ADMIN — INSULIN ASPART 6 UNITS: 100 INJECTION, SOLUTION INTRAVENOUS; SUBCUTANEOUS at 09:01

## 2024-01-12 RX ADMIN — INSULIN ASPART 6 UNITS: 100 INJECTION, SOLUTION INTRAVENOUS; SUBCUTANEOUS at 12:01

## 2024-01-12 RX ADMIN — Medication: at 09:01

## 2024-01-12 RX ADMIN — WARFARIN SODIUM 5 MG: 5 TABLET ORAL at 04:01

## 2024-01-12 RX ADMIN — GABAPENTIN 125 MG: 250 SOLUTION ORAL at 09:01

## 2024-01-12 NOTE — ASSESSMENT & PLAN NOTE
+ill contacts  Respiratory panel negative  Strep test negative.   Throat culture showing pseudomonas. As per ID , will treat w/ 5 day course of zosyn (therapy completed 1/10)

## 2024-01-12 NOTE — PT/OT/SLP PROGRESS
Occupational Therapy   Treatment    Name: Radha Abbott  MRN: [id number]  Admitting Diagnosis:  LVAD (left ventricular assist device) present  39 Days Post-Op  Co-tx to ensure pt. Safsty and to accommodate for pt. Activity tolerance  Recommendations:     Discharge Recommendations: High Intensity Therapy  Discharge Equipment Recommendations:  wheelchair, hospital bed (PRW)  Barriers to discharge:  Other (Comment) (requires increased assist)    Assessment:     Radha Abbott is a 63 y.o. male with a medical diagnosis of LVAD (left ventricular assist device) present.  He presents with deficits in mobility mainly due to pain in right hip region on this date. Pt. Is motivated and participated as able on this date when provided with encouragement. Pt. Continues to require assist for changing from wall power to battery and donning vest . FMC skills continue to be limited and pt. Would benefit from practicing FMC tasks on vest (rhiannon/straps)  when not wearing as well as theraputty exercises to improve coordination and strength. . Performance deficits affecting function are weakness, impaired endurance, impaired self care skills, impaired functional mobility, gait instability, pain, decreased lower extremity function, decreased upper extremity function, impaired cardiopulmonary response to activity.     Rehab Prognosis:  Good; patient would benefit from acute skilled OT services to address these deficits and reach maximum level of function.       Plan:     Patient to be seen 5 x/week to address the above listed problems via self-care/home management, therapeutic activities, therapeutic exercises  Plan of Care Expires: 02/03/24  Plan of Care Reviewed with: patient    Subjective     Chief Complaint: pt. Reported that his right side hurt as well as hip with ambulation. Pt. Though he may have slept on some of his wires  Patient/Family Comments/goals: to get better  Pain/Comfort:  Pain Rating 1: 9/10 (with mobility)  Location - Side  1: Right  Location 1: hip  Pain Addressed 1: Reposition, Distraction, Cessation of Activity, Other (see comments), Nurse notified (MD notified and nurse)  Pain Rating Post-Intervention 1: 9/10    Objective:     Communicated with: nurse prior to session.  Patient found supine with LVAD, NG tube, telemetry upon OT entry to room.    General Precautions: Standard, fall, LVAD, sternal  contact  Orthopedic Precautions:N/A  Braces: N/A  Respiratory Status: Room air     Occupational Performance:     Bed Mobility:    Patient completed Supine to Sit with minimum assistance     Functional Mobility/Transfers:  Patient completed Sit <> Stand Transfer with moderate assistance and of 2 persons  with  platform walker from EOB as well as wheelchair on this date x 5 trials   Patient completed Bed <> Chair Transfer using Step transfer technique with moderate assistance and of 2 persons with no assistive device. FFP noted during transfer  Functional Mobility: pt. Ambulated 20 feet with PRW  and min A then took seated rest break and ambulated 30 feet on second trial with PRW and Min A followed by seated rest break and ~ 20 feet on third trial with PRW and min A  Chair follow for all mobility    Activities of Daily Living:  Upper Body Dressing: minimum assistance to don vest for LVAD seated EOB  and to secure waist strap  Socks and shoes : Mod A seated EOB with assist to manage LE in figure 4 position and to completely get heel into shoe as well as to correctly position sock      AMPAC 6 Click ADL: 17    Treatment & Education:  Pt. Required Min assist to change from wall to battery power and increased time to manage lines due to FMC deficits  Pt. Educated on importance of doing exercises with theraputty and practicing straps and rhiannon on vest when not in use to improve strength and Bilateral hands and FMC skills  Pt. Weight obtained on this date using standing scale with Mod A to stand on scale and then CGA /min A to maintain once in  stand    Patient left up in chair with all lines intact, call button in reach, and nurse notified    GOALS:   Multidisciplinary Problems       Occupational Therapy Goals          Problem: Occupational Therapy    Goal Priority Disciplines Outcome Interventions   Occupational Therapy Goal     OT, PT/OT Ongoing, Progressing    Description: Goals to be met by: 2/3/24    Patient will increase functional independence with ADLs by performing:    UB Dressing with SBA  LE Dressing with Supervision.  Grooming while standing at sink with Supervision.  Toileting from bedside commode with SBA for hygiene and clothing management.   Step transfer to bedside commode with SBA  [place] with VAD management during ADLs                             Time Tracking:     OT Date of Treatment: 01/12/24  OT Start Time: 0815  OT Stop Time: 0908  OT Total Time (min): 53 min    Billable Minutes:Self Care/Home Management 30  Therapeutic Activity 23    OT/PRIETO: OT     Number of PRIETO visits since last OT visit: 0    1/12/2024

## 2024-01-12 NOTE — ASSESSMENT & PLAN NOTE
-HeartMate 3 Implanted  12/1/2023  as DT  -HTS Primary  -Implanted by Dr. Washington  -Continue Coumadin, dosing by PharmD.  Goal INR 2.0-3.0 . Subtherapeutic today.   -Antiplatelets Not on ASA  -LDH is stable overall today. Will continue to monitor daily.  -Weaned off midodrine.  -Speed set at  4900   rpm, LSL 4500 rpm   -Interrogation notable for no events  -ECHO 1/2/24 mild TR, LVEDD 6.25 with HM3 set to 4900 rpm   -Not listed for OHTx, declined for OHTX due to comorbidities   -PT/OT/Speech. Recommending rehab but HD + LVAD may be barrier      Procedure: Device Interrogation Including analysis of device parameters  Current Settings: Ventricular Assist Device  Review of device function is stable/unstable stable        1/12/2024    12:06 PM 1/12/2024     8:03 AM 1/12/2024     3:44 AM 1/11/2024    11:31 PM 1/11/2024     8:19 PM 1/11/2024     4:03 PM 1/11/2024    11:56 AM   TXP LVAD INTERROGATIONS   Type HeartMate3 HeartMate3 HeartMate3 HeartMate3 HeartMate3 HeartMate3 HeartMate3   Flow 4 4 4 3.7 3.7 3.8 4.2   Speed 4900 4900 4900 4950 4900 4900 4900   PI 4.1 3.5 3.8 4.4 5.9 5.3 3.7   Power ([place]) 3.2 3.3 3.3 3.3 3.4 3.3 3.4   LSL 4500 4500 4500 4500 4500 4500 4500   Pulsatility   Intermittent pulse

## 2024-01-12 NOTE — PROGRESS NOTES
Patient was seen today in the hospital. Patient awake at time of visit. Spouse at bedside. Patient and spouse educated on VAD equipment. MPU, UBC, batteries, emergency bag, and equipment cleaning were all covered. The monthly equipment cleaning checklist was reviewed in detail with patient and spouse today. They were shown how to clean/inspect the equipment and a full inspection was completed on the equipment for this month. They were both encouraged to continue practicing the equipment cleaning and inspection. Patient is progressing well with his overall understanding of the VAD equipment. Will come by early next week for more education.

## 2024-01-12 NOTE — ASSESSMENT & PLAN NOTE
- suspect acute tubular injury secondary to hypotension/cardiogenic shock likely compounded by intra-arterial contrast and anemia with urinary sediment with granular casts    -No further plans for HD today   -Continue EPO with HD  -strict I/O's  -Continue to monitor for signs if renal recovery   -renal diet/tube feeds when not NPO, volume restriction per primary team  -renally all dose medications to eGFR  -avoid nephrotoxic agents wean feasible (i.e. NSAIDs, intra-arterial contrast, supra-therapeutic vancomycin levels, etc.)

## 2024-01-12 NOTE — PT/OT/SLP PROGRESS
Physical Therapy Treatment    Patient Name:  Radha Abbott   MRN:  [id number]  Admit Date: 11/9/2023  Admitting Diagnosis:  LVAD (left ventricular assist device) present   Length of Stay: 64 days  Recent Surgery: Procedure(s) (LRB):  CLOSURE, WOUND, STERNUM (N/A)  INSERTION, GRAFT, PERICARDIUM  DRAINAGE, PLEURAL EFFUSION 39 Days Post-Op    Recommendations:     Discharge Recommendations:  High Intensity Therapy   Discharge Equipment Recommendations: to be determined by next level of care    Plan:     During this hospitalization, patient to be seen 6 x/week to address the listed problems via gait training, therapeutic activities, therapeutic exercises, neuromuscular re-education  Plan of Care Expires:  02/05/24  Plan of Care Reviewed with: patient, spouse    Assessment:     Radha Abbott is a 63 y.o. male admitted with a medical diagnosis of LVAD (left ventricular assist device) present. Pt limited by new onset of R hip pain. RN and medical team notified. Pt amble to ambulate in the hallway using a PRW but was unable to progress distance due to R hip pain. Due pt weak posterior chain, pt unable to stand with 100% upright posture. Use of PRW assists pt to 75% upright posture. Will continue to work on posterior chain strengthening, gait distance, and balance.  Patient presents with good participation and motivation to return to prior level of function with High Intensity Therapy.  The Patient demonstrates appropriate endurance and strength to participate in up to 3 hours or 15hrs of combined therapy post acute.        Problem List: weakness, impaired endurance, impaired sensation, impaired functional mobility, gait instability, impaired balance, decreased upper extremity function, impaired cardiopulmonary response to activity, pain (Simultaneous filing. User may not have seen previous data.).  Rehab Prognosis: Good     GOALS:   Multidisciplinary Problems       Physical Therapy Goals          Problem: Physical Therapy    Goal  Priority Disciplines Outcome Goal Variances Interventions   Physical Therapy Goal     PT, PT/OT Ongoing, Progressing     Description: Goals to be met by: 23     Patient will increase functional independence with mobility by performin. Sit to stand transfer with modified independence  2. Bed to chair transfer with supervision  3. Gait  x 150 feet with supervision using physician approved AD with standing rest breaks prn.   4. Lower extremity exercise program x30 reps per handout, with independence  5. Recite 3/3 sternal precautions and remain complaint with precautions throughout session with no verbal cues                     Multidisciplinary Problems (Resolved)          Problem: Physical Therapy    Goal Priority Disciplines Outcome Goal Variances Interventions   Physical Therapy Goal   (Resolved)     PT, PT/OT Met     Description: Goals to be met by: 23     Patient will increase functional independence with mobility by performin. Evaluate pt's need for physical therapy.                          Subjective   Communicated with RN prior to session.  Patient found HOB elevated upon PT entry to room, agreeable to evaluation. Radha Abbott's alone during session.    Chief Complaint: new onset of R hip pain   Patient/Family Comments/goals: to get better   Pain/Comfort:       Objective:   Patient found with: telemetry, pulse ox (continuous), blood pressure cuff, LVAD, PICC line, central line, NG tube, arterial line   General Precautions: Standard, Cardiac fall, LVAD, sternal   Orthopedic Precautions:N/A   Braces:     Oxygen Device: Room Air  Vitals: BP (!) 82/0 (BP Location: Left arm, Patient Position: Lying)   Pulse 99   Temp 98.2 °F (36.8 °C) (Oral)   Resp 18   Ht 6' (1.829 m)   Wt 55.3 kg (121 lb 14.6 oz)   SpO2 96%   BMI 16.53 kg/m²     Outcome Measures:  AM-PAC 6 CLICK MOBILITY  Turning over in bed (including adjusting bedclothes, sheets and blankets)?: 3  Sitting down on and standing up  from a chair with arms (e.g., wheelchair, bedside commode, etc.): 2  Moving from lying on back to sitting on the side of the bed?: 3  Moving to and from a bed to a chair (including a wheelchair)?: 2  Need to walk in hospital room?: 3  Climbing 3-5 steps with a railing?: 1  Basic Mobility Total Score: 14     Functional Mobility:  Additional staff present: OT - due to pt requiring 2 skilled therapists to safely perform functional mobility and to accommodate pt's activity tolerance    Bed Mobility:   Supine to Sit: minimum assistance; HOB elevated  Scooting anteriorly to EOB to have both feet planted on floor: stand by assistance      Sitting Balance at Edge of Bed:  Assistance Level Required: Stand-by Assistance  Time: 10 minutes  Comments:   Worked on activity tolerance sitting EOB and worked on tolerance to positional change   Pt worked on LVAD management    Transfers:   Sit <> Stand Transfer: moderate assistance and of 2 persons with no assistive device from EOBx1 and Wcx4  Facilitation of hip and thoracic extension   Facilitation of anterior weight shifting   Verbal cuing for upright posture       Gait:  Patient ambulated: 20ft + 30ft (seated rest break between trials)   Patient required: minimal assist  Patient used: B PRW  Gait Pattern observed: reciprocal gait  Gait Deviation(s): unsteady gait, decreased step length, narrow base of support, flexed posture, and decreased gina  Impairments due to: impaired balance, decreased strength, and decreased endurance  Comments:   Emergency bas present; chair follow present   Pt only able to stand 75% upright due to weakness in posterior chain  Verbal cuing for upright posture, to widen [place], and for forward gaze  Verbal cuing for pacing       Therapeutic Activities, Exercises, and Education:   Educated pt on PT role/POC  Educated pt on importance of OOB activity and daily ambulation   Educated pt on sternal precautions  Pt verbalized understanding     LVAD switched from  wall>battery>wall and place in vest with min A  No alarms sounded     OT worked on LVAD education and LVAD management    Therapeutic Exercise  Mobility for generalized strengthening  Sit to stand x 5 reps to increase BLE strength and increase standing tolerance  Initiation and use of postural and core muscles during sitting and standing trials  Initiation and use of posterior chain during gait trials      Patient left up in chair with all lines intact, call button in reach, and RN notified..    Time Tracking:     PT Received On: 01/12/24  PT Start Time: 0815     PT Stop Time: 0908  PT Total Time (min): 53 min       Billable Minutes:   Gait Training 23 and Therapeutic Exercise 30    Treatment Type: Treatment  PT/PTA: PT

## 2024-01-12 NOTE — PROGRESS NOTES
[facility] - Cardiology Stepdown  Heart Transplant  Progress Note    Patient Name: Radha Abbott  MRN: [id number]  Admission Date: 11/9/2023  Hospital Length of Stay: 64 days  Attending Physician: Sandhya Price MD  Primary Care Provider: Vasu Kong MD  Principal Problem:LVAD (left ventricular assist device) present    Subjective:   Interval History: Patient complaining of some right hip pain this morning that limited his duration of walking with therapy.  Possibly secondary to sleeping on it wrong.  Will monitor. Last HD 1/10-per Nephrology: no plans for HD today.  Otherwise no new complaints.  Will continue current regimen.     Continuous Infusions:   sodium chloride 0.9%      dextrose 10 % in water (D10W)       Scheduled Meds:   acetaminophen  1,000 mg Per NG tube TID    alteplase  2 mg Intra-Catheter Once    amiodarone  400 mg Per NG tube Daily    balsam peru-castor oiL   Topical (Top) BID    cholecalciferol (vitamin D3)  2,000 Units Per NG tube Daily    epoetin zohaib (PROCRIT) injection  50 Units/kg Subcutaneous Every Tues, Thurs, Sat    famotidine  20 mg Per G Tube Daily    gabapentin  125 mg Per NG tube Q12H    insulin aspart U-100  4 Units Subcutaneous Q24H    insulin aspart U-100  4 Units Subcutaneous Q24H    insulin aspart U-100  4 Units Subcutaneous Q24H    insulin aspart U-100  6 Units Subcutaneous Q24H    insulin aspart U-100  6 Units Subcutaneous Q24H    insulin aspart U-100  6 Units Subcutaneous Q24H    mirtazapine  7.5 mg Oral Nightly    polyethylene glycol  17 g Per NG tube Daily    psyllium husk (aspartame)  1 packet Per NG tube Daily    senna-docusate 8.6-50 mg  2 tablet Per NG tube Daily    traZODone  50 mg Per NG tube QHS    venlafaxine  37.5 mg Per G Tube Daily    warfarin  5 mg Oral Daily     PRN Meds:0.9%  NaCl infusion (for blood administration), bisacodyL, dextrose 10 % in water (D10W), dextrose 10%, dextrose 10%, dextrose 10%, dextrose 10%, glucagon (human recombinant), heparin  (porcine), hydrALAZINE, insulin aspart U-100, ondansetron, Flushing PICC/Midline Protocol **AND** [DISCONTINUED] sodium chloride 0.9% **AND** sodium chloride 0.9%, traMADoL, zolpidem    Review of patient's allergies indicates:  No Known Allergies  Objective:     Vital Signs (Most Recent):  Temp: 98.2 °F (36.8 °C) (01/12/24 0730)  Pulse: 99 (01/12/24 1200)  Resp: 18 (01/12/24 0730)  BP: (!) 82/0 (01/12/24 1205)  SpO2: 96 % (01/12/24 0730) Vital Signs (24h Range):  Temp:  [97.7 °F (36.5 °C)-98.5 °F (36.9 °C)] 98.2 °F (36.8 °C)  Pulse:  [] 99  Resp:  [17-18] 18  SpO2:  [96 %-98 %] 96 %  BP: (78-88)/(0) 82/0     Patient Vitals for the past 72 hrs (Last 3 readings):   Weight   01/12/24 0908 55.3 kg (121 lb 14.6 oz)   01/11/24 1250 70.7 kg (155 lb 13.8 oz)   01/10/24 0756 69.9 kg (154 lb)       Body mass index is 16.53 kg/m².      Intake/Output Summary (Last 24 hours) at 1/12/2024 1228  Last data filed at 1/12/2024 1209  Gross per 24 hour   Intake 1079 ml   Output 375 ml   Net 704 ml         Hemodynamic Parameters:       Telemetry: SR       Physical Exam  Constitutional:       Comments: Cachectic, temporal wasting   HENT:      Head: Normocephalic and atraumatic.   Eyes:      Conjunctiva/sclera: Conjunctivae normal.      Pupils: Pupils are equal, round, and reactive to light.   Neck:      Comments: Do not appreciate elevated JVP  Cardiovascular:      Rate and Rhythm: Normal rate and regular rhythm.      Comments: Smooth VAD hum  Pulmonary:      Effort: Pulmonary effort is normal.      Breath sounds: Normal breath sounds.   Abdominal:      General: Bowel sounds are normal.      Palpations: Abdomen is soft.   Musculoskeletal:         General: No swelling. Normal range of motion.      Cervical back: Normal range of motion and neck supple.   Skin:     General: Skin is warm and dry.      Capillary Refill: Capillary refill takes 2 to 3 seconds.   Neurological:      General: No focal deficit present.      Mental Status: He  is alert and oriented to person, place, and time.            Significant Labs:  CBC:  Recent Labs   Lab 01/10/24  0338 01/11/24  0351 01/12/24  0535   WBC 8.29 8.96 9.64   RBC 2.29* 2.76* 2.64*   HGB 6.4* 7.8* 7.5*   HCT 21.0* 25.7* 24.4*   * 430 425   MCV 92 93 92   MCH 27.9 28.3 28.4   MCHC 30.5* 30.4* 30.7*       BNP:  Recent Labs   Lab 01/08/24  0433 01/10/24  0338 01/12/24  0535   * 683* 830*       CMP:  Recent Labs   Lab 01/10/24  0337 01/11/24  0351 01/12/24  0535   * 113* 152*   CALCIUM 8.8 8.5* 8.8   ALBUMIN 1.9* 2.0* 1.9*   PROT 7.0 7.2 7.0   * 136 134*   K 3.5 3.4* 3.5   CO2 21* 25 24   CL 98 99 100   * 60* 74*   CREATININE 6.0* 3.4* 4.2*   ALKPHOS 164* 160* 147*   ALT 26 26 22   AST 29 30 25   BILITOT 0.3 0.3 0.2        Coagulation:   Recent Labs   Lab 01/10/24  0337 01/11/24  0351 01/12/24  0535   INR 3.8* 2.4* 1.8*       LDH:  Recent Labs   Lab 01/10/24  0337 01/11/24  0351 01/12/24  0535   * 329* 295*       Microbiology:  Microbiology Results (last 7 days)       Procedure Component Value Units Date/Time    Fungus culture [[id number]] Collected: 12/06/23 1532    Order Status: Completed Specimen: Body Fluid from Lung, Right Updated: 01/09/24 1154     Fungus (Mycology) Culture No fungus isolated after 4 weeks    Narrative:      Bronchial Wash    Throat culture [[id number]]  (Abnormal)  (Susceptibility) Collected: 01/04/24 1942    Order Status: Completed Specimen: Throat Updated: 01/08/24 1348     RESPIRATORY CULTURE - THROAT PSEUDOMONAS AERUGINOSA   Many  Normal respiratory bobby also present      Influenza A & B by Molecular [[id number]] Collected: 01/05/24 1607    Order Status: Completed Specimen: Nasopharyngeal Swab Updated: 01/05/24 1647     Influenza A, Molecular Negative     Influenza B, Molecular Negative     Flu A & B Source NP            I have reviewed all pertinent labs within the past 24 hours.    Estimated Creatinine Clearance: 14.4 mL/min (A)  (based on SCr of 4.2 mg/dL (H)).    Diagnostic Results:  I have reviewed and interpreted all pertinent imaging results/findings within the past 24 hours.  Assessment and Plan:     63 year old male with hx of CAD s/p 3v CABG (unclear anatomy, 2009), ICM with a recent EF of 15-20% s/p ICD (elsa 2009), DM2 (a1c 7.7), HTN, HLD, Vfib on amio who presents to the ED with CC of SOB     Pt was recently admitted to [facility] [place], [place] as a transfer.  He was started on a Lasix gtt and did well.  Started on gDMT and discharged home on  5 with plans to follow up in [facility] clinic for ongoing transplant evaluation at another facility.  He says that about a few days ago he started to notice LE swelling.  This turned into Nelson and orthopnea.  He says he can't walk to the bathroom without getting SOB.  He also complains of weight gain.  He takes torsemide 20mg BID at home and was told to trial additional Lasix which he did without any improvement.  He was rx metolazone but has not been filled.  He came to the ED     In the ED he was AF with stable VS on RA.  CBC showing chronic anemia.  CMP notable for acute on chronic CKD with baseline around 2.1 and Cr now 2.6.  BNP elevated.  Lactate neg.  CXR showing pulmonary edema.  I evaluated the pt at the bedside.  Bedside TTE showing CVP >15.  He was subsequently admitted to the CCU for diuresis.     He was continued on his home  and was started on a lasix gtt, which he responded well to overnight (net -1700cc. He feels much better this morning as well. Our transplant coordinators have been working on insurance approval and he is now being transferred to [facility] service for transplant evaluation.     * LVAD (left ventricular assist device) present  -HeartMate 3 Implanted  12/1/2023  as DT  -[facility]  -Implanted by Dr. Washington  -Continue Coumadin, dosing by PharmD.  Goal INR 2.0-3.0 . Subtherapeutic today.   -Antiplatelets Not on ASA  -LDH is stable overall today. Will continue to monitor  daily.  -Weaned off midodrine.  -Speed set at  4900   rpm, LSL 4500 rpm   -Interrogation notable for no events  -ECHO 1/2/24 mild TR, LVEDD 6.25 with HM3 set to 4900 rpm   -Not listed for OHTx, declined for OHTX due to comorbidities   -PT/OT/Speech. Recommending rehab but HD + LVAD may be barrier      Procedure: Device Interrogation Including analysis of device parameters  Current Settings: Ventricular Assist Device  Review of device function is stable/unstable stable        1/12/2024    12:06 PM 1/12/2024     8:03 AM 1/12/2024     3:44 AM 1/11/2024    11:31 PM 1/11/2024     8:19 PM 1/11/2024     4:03 PM 1/11/2024    11:56 AM   TXP LVAD INTERROGATIONS   Type HeartMate3 HeartMate3 HeartMate3 HeartMate3 HeartMate3 HeartMate3 HeartMate3   Flow 4 4 4 3.7 3.7 3.8 4.2   Speed 4900 4900 4900 4950 4900 4900 4900   PI 4.1 3.5 3.8 4.4 5.9 5.3 3.7   Power ([place]) 3.2 3.3 3.3 3.3 3.4 3.3 3.4   LSL 4500 4500 4500 4500 4500 4500 4500   Pulsatility   Intermittent pulse             Acute on chronic combined systolic and diastolic CHF, NYHA class 4  Pt with known ICM with an EF of 25% on home  presented with decompensated HF.    -S/P LVAD    Acute renal failure with acute tubular necrosis superimposed on stage 3b chronic kidney disease  IVÁN on CKD2. Baseline Cr of 1.7-2.0.   - Hold ARNi  -Trend BMPs daily   -Nephrology following   -SLED/SCUF for volume removal began on 12/1. He is anuric  -Did not respond to diuretic challenge with 240 mg IV Lasix, 1000 mg Diuril, and 500 mg IV Diamox done 12/14  -Diuretic challenge 1/8 with 160 mg IVP Lasix, total  cc/24 hours   -Now on HD TThSat  -Tunneled trialysis line placed 12/22 by IR    Right parotid mass  - 2.2cm R. Parotid mass noted.   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 Can follow up as outpatient w/ ENT for FNA. No further interventions needed as inpatient.     Lymphadenopathy  +ill contacts  Respiratory panel negative  Strep test negative.   Throat culture  showing pseudomonas. As per ID , will treat w/ 5 day course of zosyn (therapy completed 1/10)    Moderate malnutrition  -RD and SLP following  -Tolerating tube feeds. Total daily caloric intake increased to 2160  -Failed MBBS 12/20, continue TF.  -Speech following, would appreciate their continued recommendations     Ventricular tachycardia  Hx VT s/p ICD placement (medtronic 2009)  - Continue home amio 400mg qd    PAF (paroxysmal atrial fibrillation)  Known hx of pAF. In sinus rhythm on admission, but 1 run of AF RVR overnight. He spontaneously converted.  - Continue home amiodarone 400mg qd  - Continue AC, on hold with supratherapeutic INR       CAD (coronary artery disease)  -S/p 3vCABG in 2009  -Lipitor on hold 2/2 mild transaminitis  -Not on ASA post VAD    Type 2 diabetes mellitus without complication, without long-term current use of insulin  -A1c 7.6, not insulin dependent  - Endocrine following, appreciate assistance with blood glucose management    Dysphonia  -Speech following closely  -Appreciate ENT's help. S/P augmentation of paralyzed left vocal cord 12/18    Depressed mood  -Psychiatry consulted for depressed mood, SI when left alone  -Recommend sitter when alone (possibly with transition to stepdown).  -Psych reconsulted 1/2, agree with venlafaxine and trazadone    Unilateral complete vocal fold paralysis  -Appreciate ENT's help  -S/P augmentation of paralyzed left vocal cord 12/18    IVÁN (acute kidney injury)  -Nephrology following          DENIZ Faye  Heart Transplant  [facility] - Cardiology Stepdown

## 2024-01-12 NOTE — NURSING
Rounded on patient, wife at bedside. Patient and wife asking if NG tube can be removed. He has had pudding, yogurt (15g of protein) and a cup of chili today. Both verbalize that it is difficult to eat more with the NG tube and he is getting continuous feeds at a rate of 40 which is making him full. I spoke with DENIZ Colunga who will review with Nutrition. I informed patient and wife.

## 2024-01-12 NOTE — NURSING
Feeding stopped per order for feedings to be changed to night time only. NG tube flushed with 50 mL after disconnecting.

## 2024-01-12 NOTE — PLAN OF CARE
Plan of care discussed with patient. Patient AOX4 and VSS. Patient ambulating independently, fall precautions in place. LVAD DP and numbers WNL, smooth LVAD hum. Patient has no complaints of pain. Discussed medications and care. Patient has no questions at this time. Tube feeds stopped per order and to be continued on night shift only.

## 2024-01-12 NOTE — PROGRESS NOTES
[facility] - Cardiology Stepdown  Endocrinology  Progress Note    Admit Date: 11/9/2023     Reason for Consult: Management of T2DM, Hyperglycemia     Surgical Procedure and Date: n/a    Diabetes diagnosis year: 1998    Home Diabetes Medications:  Metformin (off since October)   Lab Results   Component Value Date    HGBA1C 7.6 (H) 10/12/2023       How often checking glucose at home?  Once daily in the AM    BG readings on regimen: 150-160s  Hypoglycemia on the regimen?  No  Missed doses on regimen?  n/a    Diabetes Complications include:     Hyperglycemia    Complicating diabetes co morbidities:   CAD s/p CABG, HTN, HLD      HPI:   Patient is a 63 y.o. male with a diagnosis of CAD s/p 3v CABG (unclear anatomy, 2009), ICM with a recent EF of 15-20% s/p ICD (medtronik 2009), DM2 (a1c 7.7), HTN, HLD, Vfib on amio who presents to the ED with CC of SOB. In the ED he was AF with stable VS on RA.  CBC showing chronic anemia.  CMP notable for acute on chronic CKD with baseline around 2.1 and Cr now 2.6.  BNP elevated.  Lactate neg.  CXR showing pulmonary edema. He was subsequently admitted to the CCU for diuresis.  Endocrinology consulted for management of T2DM.          Interval HPI:   Overnight events: No acute events overnight. Patient in room 312/312 A. Blood glucose stable. BG at goal on current insulin regimen (Schedule Insulin and SSI (TPN/TF)). Steroid use- None. 39 Days Post-Op  Renal function- Abnormal - Creatinine 4.2   Vasopressors-  None       Endocrine will continue to follow and manage insulin orders inpatient.         Diet Minced & Moist (IDDSI Level 5) Honey Thick (Moderately Thick)     Eating:   <25%  Nausea: No  Hypoglycemia and intervention: No  Fever: No  TPN and/or TF: Yes  If yes, type of TF/TPN and rate: TF @ 45 ml/hr    BP (!) 86/0 (BP Location: Left arm, Patient Position: Lying)   Pulse 95   Temp 98.2 °F (36.8 °C) (Oral)   Resp 18   Ht 6' (1.829 m)   Wt 70.7 kg (155 lb 13.8 oz)   SpO2 96%   BMI  "21.14 kg/m²     Labs Reviewed and Include    Recent Labs   Lab 01/12/24  0535   *   CALCIUM 8.8   ALBUMIN 1.9*   PROT 7.0   *   K 3.5   CO2 24      BUN 74*   CREATININE 4.2*   ALKPHOS 147*   ALT 22   AST 25   BILITOT 0.2     Lab Results   Component Value Date    WBC 9.64 01/12/2024    HGB 7.5 (L) 01/12/2024    HCT 24.4 (L) 01/12/2024    MCV 92 01/12/2024     01/12/2024     No results for input(s): "TSH", "FREET4" in the last 168 hours.  Lab Results   Component Value Date    HGBA1C 7.6 (H) 10/12/2023       Nutritional status:   Body mass index is 21.14 kg/m².  Lab Results   Component Value Date    ALBUMIN 1.9 (L) 01/12/2024    ALBUMIN 2.0 (L) 01/11/2024    ALBUMIN 1.9 (L) 01/10/2024     Lab Results   Component Value Date    PREALBUMIN 35 01/12/2024    PREALBUMIN 36 01/10/2024    PREALBUMIN 26 01/08/2024       Estimated Creatinine Clearance: 18.5 mL/min (A) (based on SCr of 4.2 mg/dL (H)).    Accu-Checks  Recent Labs     01/10/24  2009 01/11/24  0003 01/11/24  0351 01/11/24  1200 01/11/24  1600 01/11/24  1622 01/11/24  2014 01/11/24  2337 01/12/24  0346 01/12/24  0811   POCTGLUCOSE 166* 128* 128* 165* 138* 146* 86 203* 144* 183*       Current Medications and/or Treatments Impacting Glycemic Control  Immunotherapy:    Immunosuppressants       None          Steroids:   Hormones (From admission, onward)      None          Pressors:    Autonomic Drugs (From admission, onward)      None          Hyperglycemia/Diabetes Medications:   Antihyperglycemics (From admission, onward)      Start     Stop Route Frequency Ordered    01/05/24 0800  insulin aspart U-100 pen 6 Units         -- SubQ Every 24 hours (non-standard times) 01/04/24 1214    01/05/24 0400  insulin aspart U-100 pen 4 Units         -- SubQ Every 24 hours (non-standard times) 01/04/24 1214    01/05/24 0000  insulin aspart U-100 pen 4 Units         -- SubQ Every 24 hours (non-standard times) 01/04/24 1214    01/04/24 2000  insulin aspart " U-100 pen 4 Units         -- SubQ Every 24 hours (non-standard times) 01/04/24 1214    01/04/24 1600  insulin aspart U-100 pen 6 Units         -- SubQ Every 24 hours (non-standard times) 01/04/24 1214    01/04/24 1213  insulin aspart U-100 pen 6 Units         -- SubQ Every 24 hours (non-standard times) 01/04/24 1214    12/31/23 1108  insulin aspart U-100 pen 0-5 Units         -- SubQ Every 4 hours PRN 12/31/23 1008            ASSESSMENT and PLAN    Cardiac/Vascular  * LVAD (left ventricular assist device) present  Optimize BG control to improve wound healing        PAF (paroxysmal atrial fibrillation)  May increase insulin resistance.         Acute on chronic combined systolic and diastolic CHF, NYHA class 4  Managed per primary team  Optimize BG control  S/p LVAD     Renal/  Acute renal failure with acute tubular necrosis superimposed on stage 3b chronic kidney disease  Lab Results   Component Value Date    CREATININE 4.2 (H) 01/12/2024     Avoid insulin stacking  Titrate insulin slowly       Endocrine  Type 2 diabetes mellitus without complication, without long-term current use of insulin  BG goal 140-180. TF @ 45 ml/hr.     - Novolog 6 units during 0800, 1200, and 1600 BG checks to cover additional intake. -hold if TF held or BG < 100   - Novolog 4 units 2000, 0000, 0400 to cover TF- hold if TF held or BG < 100   - LDC (150/50) prn q4hr   - POCT Glucose every 4 hours  - Hypoglycemia protocol in place      ** Please notify Endocrine for any change and/or advance in diet**  ** Please call Endocrine for any BG related issues **     Discharge Planning:   TBD. Please notify endocrinology prior to discharge.                  Erasmo Parrish, DNP, FNP  Endocrinology  [facility] - Cardiology Stepdown

## 2024-01-12 NOTE — PROGRESS NOTES
[facility] - Cardiology Stepdown  Nephrology  Progress Note    Patient Name: Radha Abbott  MRN: [id number]  Admission Date: 11/9/2023  Hospital Length of Stay: 64 days  Attending Provider: Sandhya Price MD   Primary Care Physician: Vasu Kong MD  Principal Problem:LVAD (left ventricular assist device) present    Subjective:     Interval History: Scr 4.2 from 3.4 yesterday, Electrolytes stable. Last HD 1/10/24.     Review of patient's allergies indicates:  No Known Allergies  Current Facility-Administered Medications   Medication Frequency    0.9%  NaCl infusion (for blood administration) Q24H PRN    0.9%  NaCl infusion Continuous    acetaminophen tablet 1,000 mg TID    alteplase injection 2 mg Once    amiodarone tablet 400 mg Daily    balsam peru-castor oiL Oint BID    bisacodyL suppository 10 mg Daily PRN    cholecalciferol (vitamin D3) 400 units/mL oral liquid Daily    dextrose 10 % infusion Continuous PRN    dextrose 10% bolus 125 mL 125 mL PRN    dextrose 10% bolus 125 mL 125 mL PRN    dextrose 10% bolus 250 mL 250 mL PRN    dextrose 10% bolus 250 mL 250 mL PRN    epoetin zohaib injection 8,100 Units Every Tues, Thurs, Sat    famotidine tablet 20 mg Daily    gabapentin 250 mg/5 mL (5 mL) solution 125 mg Q12H    glucagon (human recombinant) injection 1 mg PRN    heparin (porcine) injection 1,000 Units PRN    hydrALAZINE injection 10 mg Q6H PRN    insulin aspart U-100 pen 0-5 Units Q4H PRN    insulin aspart U-100 pen 4 Units Q24H    insulin aspart U-100 pen 4 Units Q24H    insulin aspart U-100 pen 4 Units Q24H    insulin aspart U-100 pen 6 Units Q24H    insulin aspart U-100 pen 6 Units Q24H    insulin aspart U-100 pen 6 Units Q24H    mirtazapine tablet 7.5 mg Nightly    ondansetron injection 4 mg Q6H PRN    polyethylene glycol packet 17 g Daily    psyllium husk (aspartame) 3.4 gram packet 1 packet Daily    senna-docusate 8.6-50 mg per tablet 2 tablet Daily    sodium chloride 0.9% flush 10 mL PRN    traMADoL  tablet 50 mg Q8H PRN    traZODone tablet 50 mg QHS    venlafaxine tablet 37.5 mg Daily    warfarin (COUMADIN) tablet 5 mg Daily    zolpidem tablet 5 mg Nightly PRN       Objective:     Vital Signs (Most Recent):  Temp: 98.2 °F (36.8 °C) (01/12/24 0730)  Pulse: 98 (01/12/24 1000)  Resp: 18 (01/12/24 0730)  BP: (!) 86/0 (01/12/24 0730)  SpO2: 96 % (01/12/24 0730) Vital Signs (24h Range):  Temp:  [97.7 °F (36.5 °C)-98.5 °F (36.9 °C)] 98.2 °F (36.8 °C)  Pulse:  [] 98  Resp:  [17-18] 18  SpO2:  [96 %-98 %] 96 %  BP: (76-88)/(0) 86/0     Weight: 55.3 kg (121 lb 14.6 oz) (01/12/24 0908)  Body mass index is 16.53 kg/m².  Body surface area is 1.68 meters squared.    I/O last 3 completed shifts:  In: 2193 [P.O.:60; Other:240; NG/GT:1893]  Out: 450 [Urine:450]     Physical Exam  Vitals and nursing note reviewed.   Constitutional:       Comments: Cachectic, temporal wasting   HENT:      Head: Normocephalic and atraumatic.   Eyes:      Conjunctiva/sclera: Conjunctivae normal.   Neck:      Comments: Do not appreciate elevated JVP  Cardiovascular:      Comments: LVAD  Pulmonary:      Effort: Pulmonary effort is normal.   Abdominal:      Palpations: Abdomen is soft.   Musculoskeletal:         General: No swelling. Normal range of motion.      Cervical back: Normal range of motion and neck supple.      Right lower leg: No edema.      Left lower leg: No edema.   Skin:     General: Skin is warm.   Neurological:      Mental Status: He is alert and oriented to person, place, and time.          Significant Labs:  CBC:   Recent Labs   Lab 01/12/24  0535   WBC 9.64   RBC 2.64*   HGB 7.5*   HCT 24.4*      MCV 92   MCH 28.4   MCHC 30.7*     CMP:   Recent Labs   Lab 01/12/24  0535   *   CALCIUM 8.8   ALBUMIN 1.9*   PROT 7.0   *   K 3.5   CO2 24      BUN 74*   CREATININE 4.2*   ALKPHOS 147*   ALT 22   AST 25   BILITOT 0.2     All labs within the past 24 hours have been reviewed.         Assessment/Plan:      Cardiac/Vascular  * LVAD (left ventricular assist device) present  -management per primary     Renal/  Acute renal failure with acute tubular necrosis superimposed on stage 3b chronic kidney disease  - suspect acute tubular injury secondary to hypotension/cardiogenic shock likely compounded by intra-arterial contrast and anemia with urinary sediment with granular casts    -No further plans for HD today   -Continue EPO with HD  -strict I/O's  -Continue to monitor for signs if renal recovery   -renal diet/tube feeds when not NPO, volume restriction per primary team  -renally all dose medications to eGFR  -avoid nephrotoxic agents wean feasible (i.e. NSAIDs, intra-arterial contrast, supra-therapeutic vancomycin levels, etc.)          Thank you for your consult. I will follow-up with patient. Please contact us if you have any additional questions.    Yolanda Cai DNP  Nephrology  [facility] - Cardiology Stepdown

## 2024-01-12 NOTE — PROGRESS NOTES
01/12/2024  [place]    Current provider:  Sandhya Price MD    Device interrogation:      1/12/2024    12:06 PM 1/12/2024     8:03 AM 1/12/2024     3:44 AM 1/11/2024    11:31 PM 1/11/2024     8:19 PM 1/11/2024     4:03 PM 1/11/2024    11:56 AM   TXP LVAD INTERROGATIONS   Type HeartMate3 HeartMate3 HeartMate3 HeartMate3 HeartMate3 HeartMate3 HeartMate3   Flow 4 4 4 3.7 3.7 3.8 4.2   Speed 4900 4900 4900 4950 4900 4900 4900   PI 4.1 3.5 3.8 4.4 5.9 5.3 3.7   Power ([place]) 3.2 3.3 3.3 3.3 3.4 3.3 3.4   LSL 4500 4500 4500 4500 4500 4500 4500   Pulsatility   Intermittent pulse              Rounded on [facility] to ensure all mechanical assist device settings (IABP or VAD) were appropriate and all parameters were within limits.  I was able to ensure all back up equipment was present, the staff had no issues, and the Perfusion Department daily rounding was complete.      For implantable VADs: Interrogation of Ventricular assist device was performed with analysis of device parameters and review of device function. I have personally reviewed the interrogation findings and agree with findings as stated.     In emergency, the nursing units have been notified to contact the perfusion department either by:  Calling [phone] from 630am to 4pm Mon thru Fri, utilizing the On-Call Finder functionality of Epic and searching for Perfusion, or by contacting the hospital  from 4pm to 630am and on weekends and asking to speak with the perfusionist on call.    12:19 PM

## 2024-01-12 NOTE — ASSESSMENT & PLAN NOTE
Lab Results   Component Value Date    CREATININE 4.2 (H) 01/12/2024     Avoid insulin stacking  Titrate insulin slowly

## 2024-01-12 NOTE — SUBJECTIVE & OBJECTIVE
"Interval HPI:   Overnight events: No acute events overnight. Patient in room 312/312 A. Blood glucose stable. BG at goal on current insulin regimen (Schedule Insulin and SSI (TPN/TF)). Steroid use- None. 39 Days Post-Op  Renal function- Abnormal - Creatinine 4.2   Vasopressors-  None       Endocrine will continue to follow and manage insulin orders inpatient.         Diet Minced & Moist (IDDSI Level 5) Honey Thick (Moderately Thick)     Eating:   <25%  Nausea: No  Hypoglycemia and intervention: No  Fever: No  TPN and/or TF: Yes  If yes, type of TF/TPN and rate: TF @ 45 ml/hr    BP (!) 86/0 (BP Location: Left arm, Patient Position: Lying)   Pulse 95   Temp 98.2 °F (36.8 °C) (Oral)   Resp 18   Ht 6' (1.829 m)   Wt 70.7 kg (155 lb 13.8 oz)   SpO2 96%   BMI 21.14 kg/m²     Labs Reviewed and Include    Recent Labs   Lab 01/12/24  0535   *   CALCIUM 8.8   ALBUMIN 1.9*   PROT 7.0   *   K 3.5   CO2 24      BUN 74*   CREATININE 4.2*   ALKPHOS 147*   ALT 22   AST 25   BILITOT 0.2     Lab Results   Component Value Date    WBC 9.64 01/12/2024    HGB 7.5 (L) 01/12/2024    HCT 24.4 (L) 01/12/2024    MCV 92 01/12/2024     01/12/2024     No results for input(s): "TSH", "FREET4" in the last 168 hours.  Lab Results   Component Value Date    HGBA1C 7.6 (H) 10/12/2023       Nutritional status:   Body mass index is 21.14 kg/m².  Lab Results   Component Value Date    ALBUMIN 1.9 (L) 01/12/2024    ALBUMIN 2.0 (L) 01/11/2024    ALBUMIN 1.9 (L) 01/10/2024     Lab Results   Component Value Date    PREALBUMIN 35 01/12/2024    PREALBUMIN 36 01/10/2024    PREALBUMIN 26 01/08/2024       Estimated Creatinine Clearance: 18.5 mL/min (A) (based on SCr of 4.2 mg/dL (H)).    Accu-Checks  Recent Labs     01/10/24  2009 01/11/24  0003 01/11/24  0351 01/11/24  1200 01/11/24  1600 01/11/24  1622 01/11/24 2014 01/11/24  2337 01/12/24  0346 01/12/24  0811   POCTGLUCOSE 166* 128* 128* 165* 138* 146* 86 203* 144* 183* "       Current Medications and/or Treatments Impacting Glycemic Control  Immunotherapy:    Immunosuppressants       None          Steroids:   Hormones (From admission, onward)      None          Pressors:    Autonomic Drugs (From admission, onward)      None          Hyperglycemia/Diabetes Medications:   Antihyperglycemics (From admission, onward)      Start     Stop Route Frequency Ordered    01/05/24 0800  insulin aspart U-100 pen 6 Units         -- SubQ Every 24 hours (non-standard times) 01/04/24 1214    01/05/24 0400  insulin aspart U-100 pen 4 Units         -- SubQ Every 24 hours (non-standard times) 01/04/24 1214    01/05/24 0000  insulin aspart U-100 pen 4 Units         -- SubQ Every 24 hours (non-standard times) 01/04/24 1214    01/04/24 2000  insulin aspart U-100 pen 4 Units         -- SubQ Every 24 hours (non-standard times) 01/04/24 1214    01/04/24 1600  insulin aspart U-100 pen 6 Units         -- SubQ Every 24 hours (non-standard times) 01/04/24 1214    01/04/24 1213  insulin aspart U-100 pen 6 Units         -- SubQ Every 24 hours (non-standard times) 01/04/24 1214    12/31/23 1108  insulin aspart U-100 pen 0-5 Units         -- SubQ Every 4 hours PRN 12/31/23 1008

## 2024-01-12 NOTE — SUBJECTIVE & OBJECTIVE
Interval History: Patient complaining of some right hip pain this morning that limited his duration of walking with therapy.  Possibly secondary to sleeping on it wrong.  Will monitor. Last HD 1/10-per Nephrology: no plans for HD today.  Otherwise no new complaints.  Will continue current regimen.     Continuous Infusions:   sodium chloride 0.9%      dextrose 10 % in water (D10W)       Scheduled Meds:   acetaminophen  1,000 mg Per NG tube TID    alteplase  2 mg Intra-Catheter Once    amiodarone  400 mg Per NG tube Daily    balsam peru-castor oiL   Topical (Top) BID    cholecalciferol (vitamin D3)  2,000 Units Per NG tube Daily    epoetin zohaib (PROCRIT) injection  50 Units/kg Subcutaneous Every Tues, Thurs, Sat    famotidine  20 mg Per G Tube Daily    gabapentin  125 mg Per NG tube Q12H    insulin aspart U-100  4 Units Subcutaneous Q24H    insulin aspart U-100  4 Units Subcutaneous Q24H    insulin aspart U-100  4 Units Subcutaneous Q24H    insulin aspart U-100  6 Units Subcutaneous Q24H    insulin aspart U-100  6 Units Subcutaneous Q24H    insulin aspart U-100  6 Units Subcutaneous Q24H    mirtazapine  7.5 mg Oral Nightly    polyethylene glycol  17 g Per NG tube Daily    psyllium husk (aspartame)  1 packet Per NG tube Daily    senna-docusate 8.6-50 mg  2 tablet Per NG tube Daily    traZODone  50 mg Per NG tube QHS    venlafaxine  37.5 mg Per G Tube Daily    warfarin  5 mg Oral Daily     PRN Meds:0.9%  NaCl infusion (for blood administration), bisacodyL, dextrose 10 % in water (D10W), dextrose 10%, dextrose 10%, dextrose 10%, dextrose 10%, glucagon (human recombinant), heparin (porcine), hydrALAZINE, insulin aspart U-100, ondansetron, Flushing PICC/Midline Protocol **AND** [DISCONTINUED] sodium chloride 0.9% **AND** sodium chloride 0.9%, traMADoL, zolpidem    Review of patient's allergies indicates:  No Known Allergies  Objective:     Vital Signs (Most Recent):  Temp: 98.2 °F (36.8 °C) (01/12/24 0730)  Pulse: 99  (01/12/24 1200)  Resp: 18 (01/12/24 0730)  BP: (!) 82/0 (01/12/24 1205)  SpO2: 96 % (01/12/24 0730) Vital Signs (24h Range):  Temp:  [97.7 °F (36.5 °C)-98.5 °F (36.9 °C)] 98.2 °F (36.8 °C)  Pulse:  [] 99  Resp:  [17-18] 18  SpO2:  [96 %-98 %] 96 %  BP: (78-88)/(0) 82/0     Patient Vitals for the past 72 hrs (Last 3 readings):   Weight   01/12/24 0908 55.3 kg (121 lb 14.6 oz)   01/11/24 1250 70.7 kg (155 lb 13.8 oz)   01/10/24 0756 69.9 kg (154 lb)       Body mass index is 16.53 kg/m².      Intake/Output Summary (Last 24 hours) at 1/12/2024 1228  Last data filed at 1/12/2024 1209  Gross per 24 hour   Intake 1079 ml   Output 375 ml   Net 704 ml         Hemodynamic Parameters:       Telemetry: SR       Physical Exam  Constitutional:       Comments: Cachectic, temporal wasting   HENT:      Head: Normocephalic and atraumatic.   Eyes:      Conjunctiva/sclera: Conjunctivae normal.      Pupils: Pupils are equal, round, and reactive to light.   Neck:      Comments: Do not appreciate elevated JVP  Cardiovascular:      Rate and Rhythm: Normal rate and regular rhythm.      Comments: Smooth VAD hum  Pulmonary:      Effort: Pulmonary effort is normal.      Breath sounds: Normal breath sounds.   Abdominal:      General: Bowel sounds are normal.      Palpations: Abdomen is soft.   Musculoskeletal:         General: No swelling. Normal range of motion.      Cervical back: Normal range of motion and neck supple.   Skin:     General: Skin is warm and dry.      Capillary Refill: Capillary refill takes 2 to 3 seconds.   Neurological:      General: No focal deficit present.      Mental Status: He is alert and oriented to person, place, and time.            Significant Labs:  CBC:  Recent Labs   Lab 01/10/24  0338 01/11/24  0351 01/12/24  0535   WBC 8.29 8.96 9.64   RBC 2.29* 2.76* 2.64*   HGB 6.4* 7.8* 7.5*   HCT 21.0* 25.7* 24.4*   * 430 425   MCV 92 93 92   MCH 27.9 28.3 28.4   MCHC 30.5* 30.4* 30.7*       BNP:  Recent Labs    Lab 01/08/24  0433 01/10/24  0338 01/12/24  0535   * 683* 830*       CMP:  Recent Labs   Lab 01/10/24  0337 01/11/24  0351 01/12/24  0535   * 113* 152*   CALCIUM 8.8 8.5* 8.8   ALBUMIN 1.9* 2.0* 1.9*   PROT 7.0 7.2 7.0   * 136 134*   K 3.5 3.4* 3.5   CO2 21* 25 24   CL 98 99 100   * 60* 74*   CREATININE 6.0* 3.4* 4.2*   ALKPHOS 164* 160* 147*   ALT 26 26 22   AST 29 30 25   BILITOT 0.3 0.3 0.2        Coagulation:   Recent Labs   Lab 01/10/24  0337 01/11/24  0351 01/12/24  0535   INR 3.8* 2.4* 1.8*       LDH:  Recent Labs   Lab 01/10/24  0337 01/11/24  0351 01/12/24  0535   * 329* 295*       Microbiology:  Microbiology Results (last 7 days)       Procedure Component Value Units Date/Time    Fungus culture [[id number]] Collected: 12/06/23 1532    Order Status: Completed Specimen: Body Fluid from Lung, Right Updated: 01/09/24 1154     Fungus (Mycology) Culture No fungus isolated after 4 weeks    Narrative:      Bronchial Wash    Throat culture [[id number]]  (Abnormal)  (Susceptibility) Collected: 01/04/24 1942    Order Status: Completed Specimen: Throat Updated: 01/08/24 1348     RESPIRATORY CULTURE - THROAT PSEUDOMONAS AERUGINOSA   Many  Normal respiratory bobby also present      Influenza A & B by Molecular [[id number]] Collected: 01/05/24 1607    Order Status: Completed Specimen: Nasopharyngeal Swab Updated: 01/05/24 1647     Influenza A, Molecular Negative     Influenza B, Molecular Negative     Flu A & B Source NP            I have reviewed all pertinent labs within the past 24 hours.    Estimated Creatinine Clearance: 14.4 mL/min (A) (based on SCr of 4.2 mg/dL (H)).    Diagnostic Results:  I have reviewed and interpreted all pertinent imaging results/findings within the past 24 hours.

## 2024-01-12 NOTE — SUBJECTIVE & OBJECTIVE
Interval History: Scr 4.2 from 3.4 yesterday, Electrolytes stable. Last HD 1/10/24.     Review of patient's allergies indicates:  No Known Allergies  Current Facility-Administered Medications   Medication Frequency    0.9%  NaCl infusion (for blood administration) Q24H PRN    0.9%  NaCl infusion Continuous    acetaminophen tablet 1,000 mg TID    alteplase injection 2 mg Once    amiodarone tablet 400 mg Daily    balsam peru-castor oiL Oint BID    bisacodyL suppository 10 mg Daily PRN    cholecalciferol (vitamin D3) 400 units/mL oral liquid Daily    dextrose 10 % infusion Continuous PRN    dextrose 10% bolus 125 mL 125 mL PRN    dextrose 10% bolus 125 mL 125 mL PRN    dextrose 10% bolus 250 mL 250 mL PRN    dextrose 10% bolus 250 mL 250 mL PRN    epoetin zohaib injection 8,100 Units Every Tues, Thurs, Sat    famotidine tablet 20 mg Daily    gabapentin 250 mg/5 mL (5 mL) solution 125 mg Q12H    glucagon (human recombinant) injection 1 mg PRN    heparin (porcine) injection 1,000 Units PRN    hydrALAZINE injection 10 mg Q6H PRN    insulin aspart U-100 pen 0-5 Units Q4H PRN    insulin aspart U-100 pen 4 Units Q24H    insulin aspart U-100 pen 4 Units Q24H    insulin aspart U-100 pen 4 Units Q24H    insulin aspart U-100 pen 6 Units Q24H    insulin aspart U-100 pen 6 Units Q24H    insulin aspart U-100 pen 6 Units Q24H    mirtazapine tablet 7.5 mg Nightly    ondansetron injection 4 mg Q6H PRN    polyethylene glycol packet 17 g Daily    psyllium husk (aspartame) 3.4 gram packet 1 packet Daily    senna-docusate 8.6-50 mg per tablet 2 tablet Daily    sodium chloride 0.9% flush 10 mL PRN    traMADoL tablet 50 mg Q8H PRN    traZODone tablet 50 mg QHS    venlafaxine tablet 37.5 mg Daily    warfarin (COUMADIN) tablet 5 mg Daily    zolpidem tablet 5 mg Nightly PRN       Objective:     Vital Signs (Most Recent):  Temp: 98.2 °F (36.8 °C) (01/12/24 0730)  Pulse: 98 (01/12/24 1000)  Resp: 18 (01/12/24 0730)  BP: (!) 86/0 (01/12/24  0730)  SpO2: 96 % (01/12/24 0730) Vital Signs (24h Range):  Temp:  [97.7 °F (36.5 °C)-98.5 °F (36.9 °C)] 98.2 °F (36.8 °C)  Pulse:  [] 98  Resp:  [17-18] 18  SpO2:  [96 %-98 %] 96 %  BP: (76-88)/(0) 86/0     Weight: 55.3 kg (121 lb 14.6 oz) (01/12/24 0908)  Body mass index is 16.53 kg/m².  Body surface area is 1.68 meters squared.    I/O last 3 completed shifts:  In: 2193 [P.O.:60; Other:240; NG/GT:1893]  Out: 450 [Urine:450]     Physical Exam  Vitals and nursing note reviewed.   Constitutional:       Comments: Cachectic, temporal wasting   HENT:      Head: Normocephalic and atraumatic.   Eyes:      Conjunctiva/sclera: Conjunctivae normal.   Neck:      Comments: Do not appreciate elevated JVP  Cardiovascular:      Comments: LVAD  Pulmonary:      Effort: Pulmonary effort is normal.   Abdominal:      Palpations: Abdomen is soft.   Musculoskeletal:         General: No swelling. Normal range of motion.      Cervical back: Normal range of motion and neck supple.      Right lower leg: No edema.      Left lower leg: No edema.   Skin:     General: Skin is warm.   Neurological:      Mental Status: He is alert and oriented to person, place, and time.          Significant Labs:  CBC:   Recent Labs   Lab 01/12/24  0535   WBC 9.64   RBC 2.64*   HGB 7.5*   HCT 24.4*      MCV 92   MCH 28.4   MCHC 30.7*     CMP:   Recent Labs   Lab 01/12/24  0535   *   CALCIUM 8.8   ALBUMIN 1.9*   PROT 7.0   *   K 3.5   CO2 24      BUN 74*   CREATININE 4.2*   ALKPHOS 147*   ALT 22   AST 25   BILITOT 0.2     All labs within the past 24 hours have been reviewed.

## 2024-01-13 LAB
ALBUMIN SERPL BCP-MCNC: 1.9 G/DL (ref 3.5–5.2)
ALP SERPL-CCNC: 132 U/L (ref 55–135)
ALT SERPL W/O P-5'-P-CCNC: 20 U/L (ref 10–44)
ANION GAP SERPL CALC-SCNC: 12 MMOL/L (ref 8–16)
AST SERPL-CCNC: 23 U/L (ref 10–40)
BASOPHILS # BLD AUTO: 0.04 K/UL (ref 0–0.2)
BASOPHILS NFR BLD: 0.4 % (ref 0–1.9)
BILIRUB DIRECT SERPL-MCNC: 0.2 MG/DL (ref 0.1–0.3)
BILIRUB SERPL-MCNC: 0.3 MG/DL (ref 0.1–1)
BUN SERPL-MCNC: 87 MG/DL (ref 8–23)
CA-I BLDV-SCNC: 1.13 MMOL/L (ref 1.06–1.42)
CALCIUM SERPL-MCNC: 8.9 MG/DL (ref 8.7–10.5)
CHLORIDE SERPL-SCNC: 101 MMOL/L (ref 95–110)
CO2 SERPL-SCNC: 23 MMOL/L (ref 23–29)
CREAT SERPL-MCNC: 4.8 MG/DL (ref 0.5–1.4)
DIFFERENTIAL METHOD BLD: ABNORMAL
EOSINOPHIL # BLD AUTO: 0.1 K/UL (ref 0–0.5)
EOSINOPHIL NFR BLD: 1.3 % (ref 0–8)
ERYTHROCYTE [DISTWIDTH] IN BLOOD BY AUTOMATED COUNT: 17.4 % (ref 11.5–14.5)
EST. GFR  (NO RACE VARIABLE): 13 ML/MIN/1.73 M^2
ESTIMATED AVG GLUCOSE: 100 MG/DL (ref 68–131)
GLUCOSE SERPL-MCNC: 123 MG/DL (ref 70–110)
HBA1C MFR BLD: 5.1 % (ref 4–5.6)
HCT VFR BLD AUTO: 24.1 % (ref 40–54)
HGB BLD-MCNC: 7.4 G/DL (ref 14–18)
IMM GRANULOCYTES # BLD AUTO: 0.04 K/UL (ref 0–0.04)
IMM GRANULOCYTES NFR BLD AUTO: 0.4 % (ref 0–0.5)
INR PPP: 1.8 (ref 0.8–1.2)
LDH SERPL L TO P-CCNC: 294 U/L (ref 110–260)
LYMPHOCYTES # BLD AUTO: 0.9 K/UL (ref 1–4.8)
LYMPHOCYTES NFR BLD: 9.2 % (ref 18–48)
MAGNESIUM SERPL-MCNC: 2.2 MG/DL (ref 1.6–2.6)
MCH RBC QN AUTO: 28.1 PG (ref 27–31)
MCHC RBC AUTO-ENTMCNC: 30.7 G/DL (ref 32–36)
MCV RBC AUTO: 92 FL (ref 82–98)
MONOCYTES # BLD AUTO: 0.9 K/UL (ref 0.3–1)
MONOCYTES NFR BLD: 9.2 % (ref 4–15)
NEUTROPHILS # BLD AUTO: 7.6 K/UL (ref 1.8–7.7)
NEUTROPHILS NFR BLD: 79.5 % (ref 38–73)
NRBC BLD-RTO: 0 /100 WBC
PHOSPHATE SERPL-MCNC: 3.7 MG/DL (ref 2.7–4.5)
PLATELET # BLD AUTO: 388 K/UL (ref 150–450)
PMV BLD AUTO: 9.5 FL (ref 9.2–12.9)
POCT GLUCOSE: 133 MG/DL (ref 70–110)
POCT GLUCOSE: 176 MG/DL (ref 70–110)
POCT GLUCOSE: 212 MG/DL (ref 70–110)
POCT GLUCOSE: 94 MG/DL (ref 70–110)
POTASSIUM SERPL-SCNC: 3.6 MMOL/L (ref 3.5–5.1)
PROT SERPL-MCNC: 7 G/DL (ref 6–8.4)
PROTHROMBIN TIME: 18.7 SEC (ref 9–12.5)
RBC # BLD AUTO: 2.63 M/UL (ref 4.6–6.2)
SODIUM SERPL-SCNC: 136 MMOL/L (ref 136–145)
WBC # BLD AUTO: 9.54 K/UL (ref 3.9–12.7)

## 2024-01-13 PROCEDURE — 80048 BASIC METABOLIC PNL TOTAL CA: CPT | Performed by: INTERNAL MEDICINE

## 2024-01-13 PROCEDURE — 25000003 PHARM REV CODE 250

## 2024-01-13 PROCEDURE — 83036 HEMOGLOBIN GLYCOSYLATED A1C: CPT | Performed by: NURSE PRACTITIONER

## 2024-01-13 PROCEDURE — 99232 SBSQ HOSP IP/OBS MODERATE 35: CPT | Mod: ,,, | Performed by: NURSE PRACTITIONER

## 2024-01-13 PROCEDURE — 85610 PROTHROMBIN TIME: CPT | Performed by: PHYSICIAN ASSISTANT

## 2024-01-13 PROCEDURE — 25000003 PHARM REV CODE 250: Performed by: INTERNAL MEDICINE

## 2024-01-13 PROCEDURE — 36415 COLL VENOUS BLD VENIPUNCTURE: CPT | Performed by: INTERNAL MEDICINE

## 2024-01-13 PROCEDURE — 25000003 PHARM REV CODE 250: Performed by: PHYSICIAN ASSISTANT

## 2024-01-13 PROCEDURE — 97530 THERAPEUTIC ACTIVITIES: CPT | Mod: CQ

## 2024-01-13 PROCEDURE — 27000207 HC ISOLATION

## 2024-01-13 PROCEDURE — 63600175 PHARM REV CODE 636 W HCPCS: Performed by: NURSE PRACTITIONER

## 2024-01-13 PROCEDURE — 99233 SBSQ HOSP IP/OBS HIGH 50: CPT | Mod: ,,, | Performed by: PHYSICIAN ASSISTANT

## 2024-01-13 PROCEDURE — 27000248 HC VAD-ADDITIONAL DAY

## 2024-01-13 PROCEDURE — 20600001 HC STEP DOWN PRIVATE ROOM

## 2024-01-13 PROCEDURE — 80076 HEPATIC FUNCTION PANEL: CPT | Performed by: INTERNAL MEDICINE

## 2024-01-13 PROCEDURE — 25000242 PHARM REV CODE 250 ALT 637 W/ HCPCS: Performed by: INTERNAL MEDICINE

## 2024-01-13 PROCEDURE — 25000003 PHARM REV CODE 250: Performed by: STUDENT IN AN ORGANIZED HEALTH CARE EDUCATION/TRAINING PROGRAM

## 2024-01-13 PROCEDURE — 83615 LACTATE (LD) (LDH) ENZYME: CPT | Performed by: STUDENT IN AN ORGANIZED HEALTH CARE EDUCATION/TRAINING PROGRAM

## 2024-01-13 PROCEDURE — 25000003 PHARM REV CODE 250: Performed by: NURSE PRACTITIONER

## 2024-01-13 PROCEDURE — 36415 COLL VENOUS BLD VENIPUNCTURE: CPT | Mod: XB | Performed by: NURSE PRACTITIONER

## 2024-01-13 PROCEDURE — 82330 ASSAY OF CALCIUM: CPT | Performed by: INTERNAL MEDICINE

## 2024-01-13 PROCEDURE — 85025 COMPLETE CBC W/AUTO DIFF WBC: CPT | Performed by: PHYSICIAN ASSISTANT

## 2024-01-13 PROCEDURE — 83735 ASSAY OF MAGNESIUM: CPT | Performed by: INTERNAL MEDICINE

## 2024-01-13 PROCEDURE — 84100 ASSAY OF PHOSPHORUS: CPT | Performed by: INTERNAL MEDICINE

## 2024-01-13 PROCEDURE — 93750 INTERROGATION VAD IN PERSON: CPT | Mod: ,,, | Performed by: INTERNAL MEDICINE

## 2024-01-13 RX ORDER — INSULIN ASPART 100 [IU]/ML
0-10 INJECTION, SOLUTION INTRAVENOUS; SUBCUTANEOUS
Status: DISCONTINUED | OUTPATIENT
Start: 2024-01-13 | End: 2024-01-17

## 2024-01-13 RX ORDER — IBUPROFEN 200 MG
16 TABLET ORAL
Status: DISCONTINUED | OUTPATIENT
Start: 2024-01-13 | End: 2024-01-24 | Stop reason: HOSPADM

## 2024-01-13 RX ORDER — TRAMADOL HYDROCHLORIDE 50 MG/1
50 TABLET ORAL EVERY 8 HOURS PRN
Status: DISCONTINUED | OUTPATIENT
Start: 2024-01-13 | End: 2024-01-24 | Stop reason: HOSPADM

## 2024-01-13 RX ORDER — GLUCAGON 1 MG
1 KIT INJECTION
Status: DISCONTINUED | OUTPATIENT
Start: 2024-01-13 | End: 2024-01-24 | Stop reason: HOSPADM

## 2024-01-13 RX ORDER — TRAZODONE HYDROCHLORIDE 50 MG/1
50 TABLET ORAL NIGHTLY
Status: DISCONTINUED | OUTPATIENT
Start: 2024-01-13 | End: 2024-01-17

## 2024-01-13 RX ORDER — AMOXICILLIN 250 MG
2 CAPSULE ORAL DAILY
Status: DISCONTINUED | OUTPATIENT
Start: 2024-01-14 | End: 2024-01-24 | Stop reason: HOSPADM

## 2024-01-13 RX ORDER — FAMOTIDINE 20 MG/1
20 TABLET, FILM COATED ORAL DAILY
Status: DISCONTINUED | OUTPATIENT
Start: 2024-01-14 | End: 2024-01-24 | Stop reason: HOSPADM

## 2024-01-13 RX ORDER — AMIODARONE HYDROCHLORIDE 200 MG/1
400 TABLET ORAL DAILY
Status: DISCONTINUED | OUTPATIENT
Start: 2024-01-14 | End: 2024-01-24 | Stop reason: HOSPADM

## 2024-01-13 RX ORDER — IBUPROFEN 200 MG
24 TABLET ORAL
Status: DISCONTINUED | OUTPATIENT
Start: 2024-01-13 | End: 2024-01-24 | Stop reason: HOSPADM

## 2024-01-13 RX ORDER — POLYETHYLENE GLYCOL 3350 17 G/17G
17 POWDER, FOR SOLUTION ORAL DAILY
Status: DISCONTINUED | OUTPATIENT
Start: 2024-01-14 | End: 2024-01-15 | Stop reason: SDUPTHER

## 2024-01-13 RX ORDER — VENLAFAXINE 37.5 MG/1
37.5 TABLET ORAL DAILY
Status: DISCONTINUED | OUTPATIENT
Start: 2024-01-14 | End: 2024-01-24 | Stop reason: HOSPADM

## 2024-01-13 RX ADMIN — Medication 2000 UNITS: at 10:01

## 2024-01-13 RX ADMIN — ACETAMINOPHEN 1000 MG: 500 TABLET ORAL at 08:01

## 2024-01-13 RX ADMIN — POTASSIUM BICARBONATE 25 MEQ: 978 TABLET, EFFERVESCENT ORAL at 02:01

## 2024-01-13 RX ADMIN — TRAMADOL HYDROCHLORIDE 50 MG: 50 TABLET, COATED ORAL at 10:01

## 2024-01-13 RX ADMIN — Medication: at 08:01

## 2024-01-13 RX ADMIN — SENNOSIDES AND DOCUSATE SODIUM 2 TABLET: 8.6; 5 TABLET ORAL at 08:01

## 2024-01-13 RX ADMIN — TRAZODONE HYDROCHLORIDE 50 MG: 50 TABLET ORAL at 08:01

## 2024-01-13 RX ADMIN — FAMOTIDINE 20 MG: 20 TABLET, FILM COATED ORAL at 08:01

## 2024-01-13 RX ADMIN — GABAPENTIN 100 MG: 100 CAPSULE ORAL at 08:01

## 2024-01-13 RX ADMIN — PSYLLIUM HUSK 1 PACKET: 3.4 POWDER ORAL at 08:01

## 2024-01-13 RX ADMIN — TRAMADOL HYDROCHLORIDE 50 MG: 50 TABLET, COATED ORAL at 04:01

## 2024-01-13 RX ADMIN — INSULIN ASPART 2 UNITS: 100 INJECTION, SOLUTION INTRAVENOUS; SUBCUTANEOUS at 04:01

## 2024-01-13 RX ADMIN — WARFARIN SODIUM 5 MG: 5 TABLET ORAL at 04:01

## 2024-01-13 RX ADMIN — AMIODARONE HYDROCHLORIDE 400 MG: 200 TABLET ORAL at 08:01

## 2024-01-13 RX ADMIN — MIRTAZAPINE 7.5 MG: 7.5 TABLET, FILM COATED ORAL at 08:01

## 2024-01-13 RX ADMIN — VENLAFAXINE 37.5 MG: 37.5 TABLET ORAL at 08:01

## 2024-01-13 RX ADMIN — INSULIN ASPART 4 UNITS: 100 INJECTION, SOLUTION INTRAVENOUS; SUBCUTANEOUS at 12:01

## 2024-01-13 RX ADMIN — ACETAMINOPHEN 1000 MG: 500 TABLET ORAL at 02:01

## 2024-01-13 NOTE — PLAN OF CARE
Problem: Adult Inpatient Plan of Care  Goal: Plan of Care Review  Outcome: Ongoing, Progressing  Goal: Absence of Hospital-Acquired Illness or Injury  Outcome: Ongoing, Progressing  Goal: Optimal Comfort and Wellbeing  Outcome: Ongoing, Progressing     Problem: Diabetes Comorbidity  Goal: Blood Glucose Level Within Targeted Range  Outcome: Ongoing, Progressing     Problem: Fluid and Electrolyte Imbalance (Acute Kidney Injury/Impairment)  Goal: Fluid and Electrolyte Balance  Outcome: Ongoing, Progressing     Problem: Oral Intake Inadequate (Acute Kidney Injury/Impairment)  Goal: Optimal Nutrition Intake  Outcome: Ongoing, Progressing    Plan of care discussed with patient. Patient AOX4 and VSS. Patient needs assistance x2, fall precautions in place. LVAD DP and numbers WNL, smooth LVAD hum. Patient has complaints of hip pain, covered with tramadol PRN. Discussed medications and care. Patient has no questions at this time.

## 2024-01-13 NOTE — SUBJECTIVE & OBJECTIVE
"Interval HPI:   Overnight events: No acute events overnight. Patient in room 312/312 A. Blood glucose stable. BG at goal on current insulin regimen (Schedule Insulin and SSI (TPN/TF)). Steroid use- None. 40 Days Post-Op  Renal function- Abnormal - Creatinine 4.8   Vasopressors-  None       Endocrine will continue to follow and manage insulin orders inpatient.         Diet Minced & Moist (IDDSI Level 5) Honey Thick (Moderately Thick)     Eating:   <25%  Nausea: No  Hypoglycemia and intervention: No  Fever: No  TPN and/or TF: Yes  If yes, type of TF/TPN and rate: TF @ 45 ml/hr    BP (!) 82/0 (BP Location: Left arm, Patient Position: Lying)   Pulse 101   Temp 97.5 °F (36.4 °C) (Oral)   Resp 18   Ht 6' (1.829 m)   Wt 55.3 kg (121 lb 14.6 oz)   SpO2 97%   BMI 16.53 kg/m²     Labs Reviewed and Include    Recent Labs   Lab 01/13/24  0306   *   CALCIUM 8.9   ALBUMIN 1.9*   PROT 7.0      K 3.6   CO2 23      BUN 87*   CREATININE 4.8*   ALKPHOS 132   ALT 20   AST 23   BILITOT 0.3     Lab Results   Component Value Date    WBC 9.54 01/13/2024    HGB 7.4 (L) 01/13/2024    HCT 24.1 (L) 01/13/2024    MCV 92 01/13/2024     01/13/2024     No results for input(s): "TSH", "FREET4" in the last 168 hours.  Lab Results   Component Value Date    HGBA1C 7.6 (H) 10/12/2023       Nutritional status:   Body mass index is 16.53 kg/m².  Lab Results   Component Value Date    ALBUMIN 1.9 (L) 01/13/2024    ALBUMIN 1.9 (L) 01/12/2024    ALBUMIN 2.0 (L) 01/11/2024     Lab Results   Component Value Date    PREALBUMIN 35 01/12/2024    PREALBUMIN 36 01/10/2024    PREALBUMIN 26 01/08/2024       Estimated Creatinine Clearance: 12.6 mL/min (A) (based on SCr of 4.8 mg/dL (H)).    Accu-Checks  Recent Labs     01/11/24 2014 01/11/24  2337 01/12/24  0346 01/12/24  0811 01/12/24  1203 01/12/24  1608 01/12/24  1639 01/12/24 2019 01/12/24 2335 01/13/24  0452   POCTGLUCOSE 86 203* 144* 183* 165* 165* 169* 127* 148* 133* "       Current Medications and/or Treatments Impacting Glycemic Control  Immunotherapy:    Immunosuppressants       None          Steroids:   Hormones (From admission, onward)      None          Pressors:    Autonomic Drugs (From admission, onward)      None          Hyperglycemia/Diabetes Medications:   Antihyperglycemics (From admission, onward)      Start     Stop Route Frequency Ordered    01/05/24 0800  insulin aspart U-100 pen 6 Units         -- SubQ Every 24 hours (non-standard times) 01/04/24 1214    01/05/24 0400  insulin aspart U-100 pen 4 Units         -- SubQ Every 24 hours (non-standard times) 01/04/24 1214    01/05/24 0000  insulin aspart U-100 pen 4 Units         -- SubQ Every 24 hours (non-standard times) 01/04/24 1214    01/04/24 2000  insulin aspart U-100 pen 4 Units         -- SubQ Every 24 hours (non-standard times) 01/04/24 1214    01/04/24 1600  insulin aspart U-100 pen 6 Units         -- SubQ Every 24 hours (non-standard times) 01/04/24 1214    01/04/24 1213  insulin aspart U-100 pen 6 Units         -- SubQ Every 24 hours (non-standard times) 01/04/24 1214    12/31/23 1108  insulin aspart U-100 pen 0-5 Units         -- SubQ Every 4 hours PRN 12/31/23 1008

## 2024-01-13 NOTE — PLAN OF CARE
Problem: Adult Inpatient Plan of Care  Goal: Plan of Care Review  Outcome: Ongoing, Progressing  Goal: Absence of Hospital-Acquired Illness or Injury  Outcome: Ongoing, Progressing  Goal: Optimal Comfort and Wellbeing  Outcome: Ongoing, Progressing  Goal: Readiness for Transition of Care  Outcome: Ongoing, Progressing     Problem: Diabetes Comorbidity  Goal: Blood Glucose Level Within Targeted Range  Outcome: Ongoing, Progressing     Problem: Fluid and Electrolyte Imbalance (Acute Kidney Injury/Impairment)  Goal: Fluid and Electrolyte Balance  Outcome: Ongoing, Progressing     Problem: Oral Intake Inadequate (Acute Kidney Injury/Impairment)  Goal: Optimal Nutrition Intake  Outcome: Ongoing, Progressing     Problem: Renal Function Impairment (Acute Kidney Injury/Impairment)  Goal: Effective Renal Function  Outcome: Ongoing, Progressing     Problem: Infection  Goal: Absence of Infection Signs and Symptoms  Outcome: Ongoing, Progressing     Problem: Adjustment to Illness (Heart Failure)  Goal: Optimal Coping  Outcome: Ongoing, Progressing     Problem: Cardiac Output Decreased (Heart Failure)  Goal: Optimal Cardiac Output  Outcome: Ongoing, Progressing     Patient AAOX4, VSS, & breathing unlabored. Bed in lowest position, locked, and call light in reach. LVAD DP and numbers WNL, smooth LVAD hum. Glucose monitored. NGT removed. Plan of care and medications discussed with pt and family member. Patient has no further questions at this time. Will continue plan of care.

## 2024-01-13 NOTE — ASSESSMENT & PLAN NOTE
Lab Results   Component Value Date    CREATININE 4.8 (H) 01/13/2024     Avoid insulin stacking  Titrate insulin slowly

## 2024-01-13 NOTE — PT/OT/SLP PROGRESS
"Physical Therapy Treatment    Patient Name:  Radha Abbott   MRN:  [id number]    Recommendations:     Discharge Recommendations: High Intensity Therapy  Discharge Equipment Recommendations: hospital bed, wheelchair  Barriers to discharge:  impaired functional mobility requiring increased assistance    Assessment:     Radha Abbott is a 63 y.o. male admitted with a medical diagnosis of LVAD (left ventricular assist device) present.  He presents with the following impairments/functional limitations: weakness, impaired endurance, impaired self care skills, impaired functional mobility, gait instability, pain, decreased upper extremity function, decreased lower extremity function, impaired cardiopulmonary response to activity.    Rehab Prognosis: Good; patient would benefit from acute skilled PT services to address these deficits and reach maximum level of function.    Recent Surgery: Procedure(s) (LRB):  CLOSURE, WOUND, STERNUM (N/A)  INSERTION, GRAFT, PERICARDIUM  DRAINAGE, PLEURAL EFFUSION 40 Days Post-Op    Plan:     During this hospitalization, patient to be seen 5 x/week to address the identified rehab impairments via gait training, therapeutic activities, therapeutic exercises, neuromuscular re-education and progress toward the following goals:    Plan of Care Expires:  02/05/24    Subjective     Chief Complaint: pain  Patient/Family Comments/goals: "I just don't know what's wrong with me.They have to do something about this pain. I can't move like this."  Pain/Comfort:  Pain Rating 1: 8/10  Location - Side 1: Right  Location - Orientation 1: generalized  Location 1:  (R thigh and R  foot pain reported on initial pain assessment; pt later reports R hip and generalized back pain upon standing from wheelchair)  Pain Addressed 1: Reposition, Distraction, Nurse notified, Cessation of Activity  Pain Rating Post-Intervention 1: 8/10      Objective:     Communicated with RN prior to session.  Patient found  seated in flexed " forward downward head/neck position in unlocked transport wheelchair in his room  with LVAD, telemetry upon PTA entry to room.     General Precautions: Standard, fall, LVAD, sternal  Orthopedic Precautions: N/A  Braces: N/A  Respiratory Status: Room air     Functional Mobility:  Transfers:     Sit to Stand:  maximal assistance with platform walker; reaches 75% upright posture but quickly regresses into nearly 90* hip flexion forward lean, poor UE use for support. Returned to sitting for safety.  Gait: unable to take any steps d/t worsening flexed posture and inability to safely support self with UEs to platform RW       Session terminated d/t pts inability to safely support self in standing despite therapist assistance and adamancy that he could only stand once d/t reported back and hip pain.       AM-PAC 6 CLICK MOBILITY  Turning over in bed (including adjusting bedclothes, sheets and blankets)?: 3  Sitting down on and standing up from a chair with arms (e.g., wheelchair, bedside commode, etc.): 2  Moving from lying on back to sitting on the side of the bed?: 3  Moving to and from a bed to a chair (including a wheelchair)?: 2  Need to walk in hospital room?: 1  Climbing 3-5 steps with a railing?: 1  Basic Mobility Total Score: 12     Patient left  seated in transport wheelchair (brakes locked)  with all lines intact, call button in reach, and RN present.    GOALS:   Multidisciplinary Problems       Physical Therapy Goals          Problem: Physical Therapy    Goal Priority Disciplines Outcome Goal Variances Interventions   Physical Therapy Goal     PT, PT/OT Ongoing, Progressing     Description: Goals to be met by: 23     Patient will increase functional independence with mobility by performin. Sit to stand transfer with modified independence  2. Bed to chair transfer with supervision  3. Gait  x 150 feet with supervision using physician approved AD with standing rest breaks prn.   4. Lower extremity  exercise program x30 reps per handout, with independence  5. Recite 3/3 sternal precautions and remain complaint with precautions throughout session with no verbal cues                     Multidisciplinary Problems (Resolved)          Problem: Physical Therapy    Goal Priority Disciplines Outcome Goal Variances Interventions   Physical Therapy Goal   (Resolved)     PT, PT/OT Met     Description: Goals to be met by: 23     Patient will increase functional independence with mobility by performin. Evaluate pt's need for physical therapy.                          Time Tracking:     PT Received On: 24  PT Start Time: 956     PT Stop Time: 1015  PT Total Time (min): 19 min     Billable Minutes: Therapeutic Activity 19    Treatment Type: Treatment        Number of PTA visits since last PT visit: 2024

## 2024-01-13 NOTE — PROGRESS NOTES
[facility] - Cardiology Stepdown  Heart Transplant  Progress Note    Patient Name: Radha Abbott  MRN: [id number]  Admission Date: 11/9/2023  Hospital Length of Stay: 65 days  Attending Physician: Sandhya Price MD  Primary Care Provider: Vasu Kong MD  Principal Problem:LVAD (left ventricular assist device) present    Subjective:   Interval History: Patient reports right hip pain is better but still there: not improved with Tylenol.  Added Tramadol and will monitor response.  His tube feeds were changed to nocturnal yesterday in an effort to help him get increase po intake (he reported feeling full with tube feeds continuously running).  Overnight: NG tube removed as patient was miserable with it.  Wife is bringing outside food and his po intake is improved.  Will monitor response and po intake.  He looks euvolemic on exam today.  Last HD was 1/10.  K+ replaced.     Continuous Infusions:   sodium chloride 0.9%       Scheduled Meds:   acetaminophen  1,000 mg Per NG tube TID    alteplase  2 mg Intra-Catheter Once    amiodarone  400 mg Per NG tube Daily    balsam peru-castor oiL   Topical (Top) BID    cholecalciferol (vitamin D3)  2,000 Units Per NG tube Daily    epoetin zohaib (PROCRIT) injection  50 Units/kg Subcutaneous Every Tues, Thurs, Sat    famotidine  20 mg Per G Tube Daily    gabapentin  100 mg Oral BID    mirtazapine  7.5 mg Oral Nightly    polyethylene glycol  17 g Per NG tube Daily    potassium bicarbonate  50 mEq Oral Once    psyllium husk (aspartame)  1 packet Per NG tube Daily    senna-docusate 8.6-50 mg  2 tablet Per NG tube Daily    traZODone  50 mg Per NG tube QHS    venlafaxine  37.5 mg Per G Tube Daily    warfarin  5 mg Oral Daily     PRN Meds:0.9%  NaCl infusion (for blood administration), bisacodyL, dextrose 10%, dextrose 10%, dextrose 10%, dextrose 10%, glucagon (human recombinant), glucose, glucose, heparin (porcine), hydrALAZINE, insulin aspart U-100, ondansetron, Flushing PICC/Midline  Protocol **AND** [DISCONTINUED] sodium chloride 0.9% **AND** sodium chloride 0.9%, traMADoL, zolpidem    Review of patient's allergies indicates:  No Known Allergies  Objective:     Vital Signs (Most Recent):  Temp: 98.1 °F (36.7 °C) (01/13/24 1213)  Pulse: 98 (01/13/24 1213)  Resp: 16 (01/13/24 1213)  BP: (!) 80/0 (01/13/24 1213)  SpO2: 96 % (01/13/24 1213) Vital Signs (24h Range):  Temp:  [97.5 °F (36.4 °C)-98.6 °F (37 °C)] 98.1 °F (36.7 °C)  Pulse:  [] 98  Resp:  [16-20] 16  SpO2:  [90 %-100 %] 96 %  BP: ()/(0-85) 80/0     Patient Vitals for the past 72 hrs (Last 3 readings):   Weight   01/13/24 0900 68.3 kg (150 lb 9.2 oz)   01/13/24 0400 68.3 kg (150 lb 9.2 oz)   01/12/24 0908 55.3 kg (121 lb 14.6 oz)       Body mass index is 20.42 kg/m².      Intake/Output Summary (Last 24 hours) at 1/13/2024 1311  Last data filed at 1/13/2024 0900  Gross per 24 hour   Intake 90 ml   Output 175 ml   Net -85 ml         Hemodynamic Parameters:       Telemetry: SR       Physical Exam  Constitutional:       Comments: Cachectic, temporal wasting   HENT:      Head: Normocephalic and atraumatic.   Eyes:      Conjunctiva/sclera: Conjunctivae normal.      Pupils: Pupils are equal, round, and reactive to light.   Neck:      Comments: Do not appreciate elevated JVP  Cardiovascular:      Rate and Rhythm: Normal rate and regular rhythm.      Comments: Smooth VAD hum  Pulmonary:      Effort: Pulmonary effort is normal.      Breath sounds: Normal breath sounds.   Abdominal:      General: Bowel sounds are normal.      Palpations: Abdomen is soft.   Musculoskeletal:         General: No swelling. Normal range of motion.      Cervical back: Normal range of motion and neck supple.   Skin:     General: Skin is warm and dry.      Capillary Refill: Capillary refill takes 2 to 3 seconds.   Neurological:      General: No focal deficit present.      Mental Status: He is alert and oriented to person, place, and time.            Significant  Labs:  CBC:  Recent Labs   Lab 01/11/24  0351 01/12/24  0535 01/13/24  0307   WBC 8.96 9.64 9.54   RBC 2.76* 2.64* 2.63*   HGB 7.8* 7.5* 7.4*   HCT 25.7* 24.4* 24.1*    425 388   MCV 93 92 92   MCH 28.3 28.4 28.1   MCHC 30.4* 30.7* 30.7*       BNP:  Recent Labs   Lab 01/08/24  0433 01/10/24  0338 01/12/24  0535   * 683* 830*       CMP:  Recent Labs   Lab 01/11/24  0351 01/12/24  0535 01/13/24  0306   * 152* 123*   CALCIUM 8.5* 8.8 8.9   ALBUMIN 2.0* 1.9* 1.9*   PROT 7.2 7.0 7.0    134* 136   K 3.4* 3.5 3.6   CO2 25 24 23   CL 99 100 101   BUN 60* 74* 87*   CREATININE 3.4* 4.2* 4.8*   ALKPHOS 160* 147* 132   ALT 26 22 20   AST 30 25 23   BILITOT 0.3 0.2 0.3        Coagulation:   Recent Labs   Lab 01/11/24  0351 01/12/24  0535 01/13/24  0307   INR 2.4* 1.8* 1.8*       LDH:  Recent Labs   Lab 01/11/24  0351 01/12/24  0535 01/13/24  0307   * 295* 294*       Microbiology:  Microbiology Results (last 7 days)       Procedure Component Value Units Date/Time    Fungus culture [[id number]] Collected: 12/06/23 1532    Order Status: Completed Specimen: Body Fluid from Lung, Right Updated: 01/09/24 1154     Fungus (Mycology) Culture No fungus isolated after 4 weeks    Narrative:      Bronchial Wash    Throat culture [[id number]]  (Abnormal)  (Susceptibility) Collected: 01/04/24 1942    Order Status: Completed Specimen: Throat Updated: 01/08/24 1348     RESPIRATORY CULTURE - THROAT PSEUDOMONAS AERUGINOSA   Many  Normal respiratory bobby also present              I have reviewed all pertinent labs within the past 24 hours.    Estimated Creatinine Clearance: 15.6 mL/min (A) (based on SCr of 4.8 mg/dL (H)).    Diagnostic Results:  I have reviewed and interpreted all pertinent imaging results/findings within the past 24 hours.  Assessment and Plan:     63 year old male with hx of CAD s/p 3v CABG (unclear anatomy, 2009), ICM with a recent EF of 15-20% s/p ICD (medtronchristiano 2009), DM2 (a1c 7.7), HTN,  HLD, Vfib on amio who presents to the ED with CC of SOB     Pt was recently admitted to [facility] [place].  He was started on a Lasix gtt and did well.  Started on gDMT and discharged home on  5 with plans to follow up in [facility] clinic for ongoing transplant evaluation at another facility.  He says that about a few days ago he started to notice LE swelling.  This turned into Nelson and orthopnea.  He says he can't walk to the bathroom without getting SOB.  He also complains of weight gain.  He takes torsemide 20mg BID at home and was told to trial additional Lasix which he did without any improvement.  He was rx metolazone but has not been filled.  He came to the ED     In the ED he was AF with stable VS on RA.  CBC showing chronic anemia.  CMP notable for acute on chronic CKD with baseline around 2.1 and Cr now 2.6.  BNP elevated.  Lactate neg.  CXR showing pulmonary edema.  I evaluated the pt at the bedside.  Bedside TTE showing CVP >15.  He was subsequently admitted to the CCU for diuresis.     He was continued on his home  and was started on a lasix gtt, which he responded well to overnight (net -1700cc. He feels much better this morning as well. Our transplant coordinators have been working on insurance approval and he is now being transferred to [facility] service for transplant evaluation.     * LVAD (left ventricular assist device) present  -HeartMate 3 Implanted  12/1/2023  as DT  -HTS Primary  -Implanted by Dr. Washington  -Continue Coumadin, dosing by PharmD.  Goal INR 2.0-3.0 . Subtherapeutic today.   -Antiplatelets Not on ASA  -LDH is stable overall today. Will continue to monitor daily.  -Weaned off midodrine.  -Speed set at  4900   rpm, LSL 4500 rpm   -Interrogation notable for no events  -ECHO 1/2/24 mild TR, LVEDD 6.25 with HM3 set to 4900 rpm   -Not listed for OHTx, declined for OHTX due to comorbidities   -PT/OT/Speech. Recommending rehab but HD + LVAD may be barrier      Procedure: Device Interrogation  Including analysis of device parameters  Current Settings: Ventricular Assist Device  Review of device function is stable/unstable stable        1/12/2024    12:06 PM 1/12/2024     8:03 AM 1/12/2024     3:44 AM 1/11/2024    11:31 PM 1/11/2024     8:19 PM 1/11/2024     4:03 PM 1/11/2024    11:56 AM   TXP LVAD INTERROGATIONS   Type HeartMate3 HeartMate3 HeartMate3 HeartMate3 HeartMate3 HeartMate3 HeartMate3   Flow 4 4 4 3.7 3.7 3.8 4.2   Speed 4900 4900 4900 4950 4900 4900 4900   PI 4.1 3.5 3.8 4.4 5.9 5.3 3.7   Power ([place]) 3.2 3.3 3.3 3.3 3.4 3.3 3.4   LSL 4500 4500 4500 4500 4500 4500 4500   Pulsatility   Intermittent pulse             Acute on chronic combined systolic and diastolic CHF, NYHA class 4  Pt with known ICM with an EF of 25% on home  presented with decompensated HF.    -S/P LVAD    Acute renal failure with acute tubular necrosis superimposed on stage 3b chronic kidney disease  IVÁN on CKD2. Baseline Cr of 1.7-2.0.   - Hold ARNi  -Trend BMPs daily   -Nephrology following   -SLED/SCUF for volume removal began on 12/1. He is anuric  -Did not respond to diuretic challenge with 240 mg IV Lasix, 1000 mg Diuril, and 500 mg IV Diamox done 12/14  -Diuretic challenge 1/8 with 160 mg IVP Lasix, total  cc/24 hours   -Now on HD TThSat  -Tunneled trialysis line placed 12/22 by IR    Right parotid mass  - 2.2cm R. Parotid mass noted.   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 Can follow up as outpatient w/ ENT for FNA. No further interventions needed as inpatient.     Lymphadenopathy  +ill contacts  Respiratory panel negative  Strep test negative.   Throat culture showing pseudomonas. As per ID , will treat w/ 5 day course of zosyn (therapy completed 1/10)    Moderate malnutrition  -RD and SLP following  -Tolerating tube feeds. Total daily caloric intake increased to 2160  -Failed MBBS 12/20, continue TF.  -Speech following, would appreciate their continued recommendations     Ventricular  tachycardia  Hx VT s/p ICD placement (medtronic 2009)  - Continue home amio 400mg qd    PAF (paroxysmal atrial fibrillation)  Known hx of pAF. In sinus rhythm on admission, but 1 run of AF RVR overnight. He spontaneously converted.  - Continue home amiodarone 400mg qd  - Continue AC, on hold with supratherapeutic INR       CAD (coronary artery disease)  -S/p 3vCABG in 2009  -Lipitor on hold 2/2 mild transaminitis  -Not on ASA post VAD    Type 2 diabetes mellitus without complication, without long-term current use of insulin  -A1c 7.6, not insulin dependent  - Endocrine following, appreciate assistance with blood glucose management    Dysphonia  -Speech following closely  -Appreciate ENT's help. S/P augmentation of paralyzed left vocal cord 12/18    Depressed mood  -Psychiatry consulted for depressed mood, SI when left alone  -Recommend sitter when alone (possibly with transition to stepdown).  -Psych reconsulted 1/2, agree with venlafaxine and trazadone    Unilateral complete vocal fold paralysis  -Appreciate ENT's help  -S/P augmentation of paralyzed left vocal cord 12/18    IVÁN (acute kidney injury)  -Nephrology following          DENIZ Faye  Heart Transplant  [facility] - Cardiology Stepdown

## 2024-01-13 NOTE — PROGRESS NOTES
[facility] - Cardiology Stepdown  Endocrinology  Progress Note    Admit Date: 11/9/2023     Reason for Consult: Management of T2DM, Hyperglycemia     Surgical Procedure and Date: n/a    Diabetes diagnosis year: 1998    Home Diabetes Medications:  Metformin (off since October)   Lab Results   Component Value Date    HGBA1C 7.6 (H) 10/12/2023       How often checking glucose at home?  Once daily in the AM    BG readings on regimen: 150-160s  Hypoglycemia on the regimen?  No  Missed doses on regimen?  n/a    Diabetes Complications include:     Hyperglycemia    Complicating diabetes co morbidities:   CAD s/p CABG, HTN, HLD      HPI:   Patient is a 63 y.o. male with a diagnosis of CAD s/p 3v CABG (unclear anatomy, 2009), ICM with a recent EF of 15-20% s/p ICD (medtronik 2009), DM2 (a1c 7.7), HTN, HLD, Vfib on amio who presents to the ED with CC of SOB. In the ED he was AF with stable VS on RA.  CBC showing chronic anemia.  CMP notable for acute on chronic CKD with baseline around 2.1 and Cr now 2.6.  BNP elevated.  Lactate neg.  CXR showing pulmonary edema. He was subsequently admitted to the CCU for diuresis.  Endocrinology consulted for management of T2DM.          Interval HPI:   Overnight events: No acute events overnight. Patient in room 312/312 A. Blood glucose stable. BG at goal on current insulin regimen (Schedule Insulin and SSI (TPN/TF)). Steroid use- None. 40 Days Post-Op  Renal function- Abnormal - Creatinine 4.8   Vasopressors-  None       Endocrine will continue to follow and manage insulin orders inpatient.         Diet Minced & Moist (IDDSI Level 5) Honey Thick (Moderately Thick)     Eating:   <25%  Nausea: No  Hypoglycemia and intervention: No  Fever: No  TPN and/or TF: Yes  If yes, type of TF/TPN and rate: TF @ 45 ml/hr    BP (!) 82/0 (BP Location: Left arm, Patient Position: Lying)   Pulse 101   Temp 97.5 °F (36.4 °C) (Oral)   Resp 18   Ht 6' (1.829 m)   Wt 55.3 kg (121 lb 14.6 oz)   SpO2 97%    "BMI 16.53 kg/m²     Labs Reviewed and Include    Recent Labs   Lab 01/13/24  0306   *   CALCIUM 8.9   ALBUMIN 1.9*   PROT 7.0      K 3.6   CO2 23      BUN 87*   CREATININE 4.8*   ALKPHOS 132   ALT 20   AST 23   BILITOT 0.3     Lab Results   Component Value Date    WBC 9.54 01/13/2024    HGB 7.4 (L) 01/13/2024    HCT 24.1 (L) 01/13/2024    MCV 92 01/13/2024     01/13/2024     No results for input(s): "TSH", "FREET4" in the last 168 hours.  Lab Results   Component Value Date    HGBA1C 7.6 (H) 10/12/2023       Nutritional status:   Body mass index is 16.53 kg/m².  Lab Results   Component Value Date    ALBUMIN 1.9 (L) 01/13/2024    ALBUMIN 1.9 (L) 01/12/2024    ALBUMIN 2.0 (L) 01/11/2024     Lab Results   Component Value Date    PREALBUMIN 35 01/12/2024    PREALBUMIN 36 01/10/2024    PREALBUMIN 26 01/08/2024       Estimated Creatinine Clearance: 12.6 mL/min (A) (based on SCr of 4.8 mg/dL (H)).    Accu-Checks  Recent Labs     01/11/24 2014 01/11/24  2337 01/12/24  0346 01/12/24  0811 01/12/24  1203 01/12/24  1608 01/12/24  1639 01/12/24  2019 01/12/24  2335 01/13/24  0452   POCTGLUCOSE 86 203* 144* 183* 165* 165* 169* 127* 148* 133*       Current Medications and/or Treatments Impacting Glycemic Control  Immunotherapy:    Immunosuppressants       None          Steroids:   Hormones (From admission, onward)      None          Pressors:    Autonomic Drugs (From admission, onward)      None          Hyperglycemia/Diabetes Medications:   Antihyperglycemics (From admission, onward)      Start     Stop Route Frequency Ordered    01/05/24 0800  insulin aspart U-100 pen 6 Units         -- SubQ Every 24 hours (non-standard times) 01/04/24 1214    01/05/24 0400  insulin aspart U-100 pen 4 Units         -- SubQ Every 24 hours (non-standard times) 01/04/24 1214    01/05/24 0000  insulin aspart U-100 pen 4 Units         -- SubQ Every 24 hours (non-standard times) 01/04/24 1214 01/04/24 2000  insulin aspart " U-100 pen 4 Units         -- SubQ Every 24 hours (non-standard times) 01/04/24 1214    01/04/24 1600  insulin aspart U-100 pen 6 Units         -- SubQ Every 24 hours (non-standard times) 01/04/24 1214    01/04/24 1213  insulin aspart U-100 pen 6 Units         -- SubQ Every 24 hours (non-standard times) 01/04/24 1214    12/31/23 1108  insulin aspart U-100 pen 0-5 Units         -- SubQ Every 4 hours PRN 12/31/23 1008            ASSESSMENT and PLAN    Cardiac/Vascular  * LVAD (left ventricular assist device) present  Optimize BG control to improve wound healing        PAF (paroxysmal atrial fibrillation)  May increase insulin resistance.         Acute on chronic combined systolic and diastolic CHF, NYHA class 4  Managed per primary team  Optimize BG control  S/p LVAD     Renal/  Acute renal failure with acute tubular necrosis superimposed on stage 3b chronic kidney disease  Lab Results   Component Value Date    CREATININE 4.8 (H) 01/13/2024     Avoid insulin stacking  Titrate insulin slowly       Endocrine  Type 2 diabetes mellitus without complication, without long-term current use of insulin  BG goal 140-180. TF @ 45 ml/hr.     - Novolog 6 units during 0800, 1200, and 1600 BG checks to cover additional intake. -hold if TF held or BG < 100   - Novolog 4 units 2000, 0000, 0400 to cover TF- hold if TF held or BG < 100   - LDC (150/50) prn q4hr   - POCT Glucose every 4 hours  - Hypoglycemia protocol in place      ** Please notify Endocrine for any change and/or advance in diet**  ** Please call Endocrine for any BG related issues **     Discharge Planning:   TBD. Please notify endocrinology prior to discharge.                  Erasmo Parrish, DNP, FNP  Endocrinology  [facility] - Cardiology Stepdown

## 2024-01-13 NOTE — SUBJECTIVE & OBJECTIVE
Interval History: Patient reports right hip pain is better but still there: not improved with Tylenol.  Added Tramadol and will monitor response.  His tube feeds were changed to nocturnal yesterday in an effort to help him get increase po intake (he reported feeling full with tube feeds continuously running).  Overnight: NG tube removed as patient was miserable with it.  Wife is bringing outside food and his po intake is improved.  Will monitor response and po intake.  He looks euvolemic on exam today.  Last HD was 1/10.  K+ replaced.     Continuous Infusions:   sodium chloride 0.9%       Scheduled Meds:   acetaminophen  1,000 mg Per NG tube TID    alteplase  2 mg Intra-Catheter Once    amiodarone  400 mg Per NG tube Daily    balsam peru-castor oiL   Topical (Top) BID    cholecalciferol (vitamin D3)  2,000 Units Per NG tube Daily    epoetin zohaib (PROCRIT) injection  50 Units/kg Subcutaneous Every Tues, Thurs, Sat    famotidine  20 mg Per G Tube Daily    gabapentin  100 mg Oral BID    mirtazapine  7.5 mg Oral Nightly    polyethylene glycol  17 g Per NG tube Daily    potassium bicarbonate  50 mEq Oral Once    psyllium husk (aspartame)  1 packet Per NG tube Daily    senna-docusate 8.6-50 mg  2 tablet Per NG tube Daily    traZODone  50 mg Per NG tube QHS    venlafaxine  37.5 mg Per G Tube Daily    warfarin  5 mg Oral Daily     PRN Meds:0.9%  NaCl infusion (for blood administration), bisacodyL, dextrose 10%, dextrose 10%, dextrose 10%, dextrose 10%, glucagon (human recombinant), glucose, glucose, heparin (porcine), hydrALAZINE, insulin aspart U-100, ondansetron, Flushing PICC/Midline Protocol **AND** [DISCONTINUED] sodium chloride 0.9% **AND** sodium chloride 0.9%, traMADoL, zolpidem    Review of patient's allergies indicates:  No Known Allergies  Objective:     Vital Signs (Most Recent):  Temp: 98.1 °F (36.7 °C) (01/13/24 1213)  Pulse: 98 (01/13/24 1213)  Resp: 16 (01/13/24 1213)  BP: (!) 80/0 (01/13/24 1213)  SpO2: 96  % (01/13/24 1213) Vital Signs (24h Range):  Temp:  [97.5 °F (36.4 °C)-98.6 °F (37 °C)] 98.1 °F (36.7 °C)  Pulse:  [] 98  Resp:  [16-20] 16  SpO2:  [90 %-100 %] 96 %  BP: ()/(0-85) 80/0     Patient Vitals for the past 72 hrs (Last 3 readings):   Weight   01/13/24 0900 68.3 kg (150 lb 9.2 oz)   01/13/24 0400 68.3 kg (150 lb 9.2 oz)   01/12/24 0908 55.3 kg (121 lb 14.6 oz)       Body mass index is 20.42 kg/m².      Intake/Output Summary (Last 24 hours) at 1/13/2024 1311  Last data filed at 1/13/2024 0900  Gross per 24 hour   Intake 90 ml   Output 175 ml   Net -85 ml         Hemodynamic Parameters:       Telemetry: SR       Physical Exam  Constitutional:       Comments: Cachectic, temporal wasting   HENT:      Head: Normocephalic and atraumatic.   Eyes:      Conjunctiva/sclera: Conjunctivae normal.      Pupils: Pupils are equal, round, and reactive to light.   Neck:      Comments: Do not appreciate elevated JVP  Cardiovascular:      Rate and Rhythm: Normal rate and regular rhythm.      Comments: Smooth VAD hum  Pulmonary:      Effort: Pulmonary effort is normal.      Breath sounds: Normal breath sounds.   Abdominal:      General: Bowel sounds are normal.      Palpations: Abdomen is soft.   Musculoskeletal:         General: No swelling. Normal range of motion.      Cervical back: Normal range of motion and neck supple.   Skin:     General: Skin is warm and dry.      Capillary Refill: Capillary refill takes 2 to 3 seconds.   Neurological:      General: No focal deficit present.      Mental Status: He is alert and oriented to person, place, and time.            Significant Labs:  CBC:  Recent Labs   Lab 01/11/24  0351 01/12/24  0535 01/13/24  0307   WBC 8.96 9.64 9.54   RBC 2.76* 2.64* 2.63*   HGB 7.8* 7.5* 7.4*   HCT 25.7* 24.4* 24.1*    425 388   MCV 93 92 92   MCH 28.3 28.4 28.1   MCHC 30.4* 30.7* 30.7*       BNP:  Recent Labs   Lab 01/08/24  0433 01/10/24  0338 01/12/24  0535   * 683* 830*        CMP:  Recent Labs   Lab 01/11/24  0351 01/12/24  0535 01/13/24  0306   * 152* 123*   CALCIUM 8.5* 8.8 8.9   ALBUMIN 2.0* 1.9* 1.9*   PROT 7.2 7.0 7.0    134* 136   K 3.4* 3.5 3.6   CO2 25 24 23   CL 99 100 101   BUN 60* 74* 87*   CREATININE 3.4* 4.2* 4.8*   ALKPHOS 160* 147* 132   ALT 26 22 20   AST 30 25 23   BILITOT 0.3 0.2 0.3        Coagulation:   Recent Labs   Lab 01/11/24  0351 01/12/24  0535 01/13/24  0307   INR 2.4* 1.8* 1.8*       LDH:  Recent Labs   Lab 01/11/24  0351 01/12/24  0535 01/13/24  0307   * 295* 294*       Microbiology:  Microbiology Results (last 7 days)       Procedure Component Value Units Date/Time    Fungus culture [[id number]] Collected: 12/06/23 1532    Order Status: Completed Specimen: Body Fluid from Lung, Right Updated: 01/09/24 1154     Fungus (Mycology) Culture No fungus isolated after 4 weeks    Narrative:      Bronchial Wash    Throat culture [[id number]]  (Abnormal)  (Susceptibility) Collected: 01/04/24 1942    Order Status: Completed Specimen: Throat Updated: 01/08/24 1348     RESPIRATORY CULTURE - THROAT PSEUDOMONAS AERUGINOSA   Many  Normal respiratory bobby also present              I have reviewed all pertinent labs within the past 24 hours.    Estimated Creatinine Clearance: 15.6 mL/min (A) (based on SCr of 4.8 mg/dL (H)).    Diagnostic Results:  I have reviewed and interpreted all pertinent imaging results/findings within the past 24 hours.

## 2024-01-14 LAB
ALBUMIN SERPL BCP-MCNC: 1.9 G/DL (ref 3.5–5.2)
ALP SERPL-CCNC: 131 U/L (ref 55–135)
ALT SERPL W/O P-5'-P-CCNC: 23 U/L (ref 10–44)
ANION GAP SERPL CALC-SCNC: 10 MMOL/L (ref 8–16)
AST SERPL-CCNC: 27 U/L (ref 10–40)
BASOPHILS # BLD AUTO: 0.06 K/UL (ref 0–0.2)
BASOPHILS NFR BLD: 0.7 % (ref 0–1.9)
BILIRUB DIRECT SERPL-MCNC: 0.2 MG/DL (ref 0.1–0.3)
BILIRUB SERPL-MCNC: 0.3 MG/DL (ref 0.1–1)
BUN SERPL-MCNC: 90 MG/DL (ref 8–23)
CALCIUM SERPL-MCNC: 9.2 MG/DL (ref 8.7–10.5)
CHLORIDE SERPL-SCNC: 103 MMOL/L (ref 95–110)
CO2 SERPL-SCNC: 23 MMOL/L (ref 23–29)
CREAT SERPL-MCNC: 5.1 MG/DL (ref 0.5–1.4)
DIFFERENTIAL METHOD BLD: ABNORMAL
EOSINOPHIL # BLD AUTO: 0.2 K/UL (ref 0–0.5)
EOSINOPHIL NFR BLD: 2 % (ref 0–8)
ERYTHROCYTE [DISTWIDTH] IN BLOOD BY AUTOMATED COUNT: 17.4 % (ref 11.5–14.5)
EST. GFR  (NO RACE VARIABLE): 12.1 ML/MIN/1.73 M^2
GLUCOSE SERPL-MCNC: 94 MG/DL (ref 70–110)
HCT VFR BLD AUTO: 21.8 % (ref 40–54)
HGB BLD-MCNC: 6.9 G/DL (ref 14–18)
IMM GRANULOCYTES # BLD AUTO: 0.05 K/UL (ref 0–0.04)
IMM GRANULOCYTES NFR BLD AUTO: 0.6 % (ref 0–0.5)
INR PPP: 1.9 (ref 0.8–1.2)
LDH SERPL L TO P-CCNC: 300 U/L (ref 110–260)
LYMPHOCYTES # BLD AUTO: 0.9 K/UL (ref 1–4.8)
LYMPHOCYTES NFR BLD: 9.8 % (ref 18–48)
MAGNESIUM SERPL-MCNC: 2.2 MG/DL (ref 1.6–2.6)
MCH RBC QN AUTO: 29 PG (ref 27–31)
MCHC RBC AUTO-ENTMCNC: 31.7 G/DL (ref 32–36)
MCV RBC AUTO: 92 FL (ref 82–98)
MONOCYTES # BLD AUTO: 0.9 K/UL (ref 0.3–1)
MONOCYTES NFR BLD: 9.4 % (ref 4–15)
NEUTROPHILS # BLD AUTO: 7.1 K/UL (ref 1.8–7.7)
NEUTROPHILS NFR BLD: 77.5 % (ref 38–73)
NRBC BLD-RTO: 0 /100 WBC
PHOSPHATE SERPL-MCNC: 4.6 MG/DL (ref 2.7–4.5)
PLATELET # BLD AUTO: 411 K/UL (ref 150–450)
PMV BLD AUTO: 9.4 FL (ref 9.2–12.9)
POCT GLUCOSE: 193 MG/DL (ref 70–110)
POCT GLUCOSE: 206 MG/DL (ref 70–110)
POCT GLUCOSE: 207 MG/DL (ref 70–110)
POCT GLUCOSE: 96 MG/DL (ref 70–110)
POTASSIUM SERPL-SCNC: 4.1 MMOL/L (ref 3.5–5.1)
PROT SERPL-MCNC: 7.2 G/DL (ref 6–8.4)
PROTHROMBIN TIME: 19.4 SEC (ref 9–12.5)
RBC # BLD AUTO: 2.38 M/UL (ref 4.6–6.2)
SODIUM SERPL-SCNC: 136 MMOL/L (ref 136–145)
WBC # BLD AUTO: 9.08 K/UL (ref 3.9–12.7)

## 2024-01-14 PROCEDURE — 27000207 HC ISOLATION

## 2024-01-14 PROCEDURE — 83615 LACTATE (LD) (LDH) ENZYME: CPT | Performed by: STUDENT IN AN ORGANIZED HEALTH CARE EDUCATION/TRAINING PROGRAM

## 2024-01-14 PROCEDURE — 25000242 PHARM REV CODE 250 ALT 637 W/ HCPCS: Performed by: PHYSICIAN ASSISTANT

## 2024-01-14 PROCEDURE — 83735 ASSAY OF MAGNESIUM: CPT | Performed by: INTERNAL MEDICINE

## 2024-01-14 PROCEDURE — 25000003 PHARM REV CODE 250: Performed by: INTERNAL MEDICINE

## 2024-01-14 PROCEDURE — 25000003 PHARM REV CODE 250: Performed by: STUDENT IN AN ORGANIZED HEALTH CARE EDUCATION/TRAINING PROGRAM

## 2024-01-14 PROCEDURE — 99233 SBSQ HOSP IP/OBS HIGH 50: CPT | Mod: ,,, | Performed by: PHYSICIAN ASSISTANT

## 2024-01-14 PROCEDURE — 20600001 HC STEP DOWN PRIVATE ROOM

## 2024-01-14 PROCEDURE — 25000003 PHARM REV CODE 250: Performed by: PHYSICIAN ASSISTANT

## 2024-01-14 PROCEDURE — 93750 INTERROGATION VAD IN PERSON: CPT | Mod: ,,, | Performed by: INTERNAL MEDICINE

## 2024-01-14 PROCEDURE — 27000248 HC VAD-ADDITIONAL DAY

## 2024-01-14 PROCEDURE — 36415 COLL VENOUS BLD VENIPUNCTURE: CPT | Performed by: PHYSICIAN ASSISTANT

## 2024-01-14 PROCEDURE — 85025 COMPLETE CBC W/AUTO DIFF WBC: CPT | Performed by: PHYSICIAN ASSISTANT

## 2024-01-14 PROCEDURE — 80048 BASIC METABOLIC PNL TOTAL CA: CPT | Performed by: INTERNAL MEDICINE

## 2024-01-14 PROCEDURE — 80076 HEPATIC FUNCTION PANEL: CPT | Performed by: INTERNAL MEDICINE

## 2024-01-14 PROCEDURE — 85610 PROTHROMBIN TIME: CPT | Performed by: PHYSICIAN ASSISTANT

## 2024-01-14 PROCEDURE — 84100 ASSAY OF PHOSPHORUS: CPT | Performed by: INTERNAL MEDICINE

## 2024-01-14 RX ORDER — FLUTICASONE PROPIONATE 50 MCG
2 SPRAY, SUSPENSION (ML) NASAL DAILY
Status: DISCONTINUED | OUTPATIENT
Start: 2024-01-14 | End: 2024-01-24 | Stop reason: HOSPADM

## 2024-01-14 RX ADMIN — MIRTAZAPINE 7.5 MG: 7.5 TABLET, FILM COATED ORAL at 09:01

## 2024-01-14 RX ADMIN — TRAMADOL HYDROCHLORIDE 50 MG: 50 TABLET, COATED ORAL at 12:01

## 2024-01-14 RX ADMIN — INSULIN ASPART 2 UNITS: 100 INJECTION, SOLUTION INTRAVENOUS; SUBCUTANEOUS at 04:01

## 2024-01-14 RX ADMIN — Medication: at 08:01

## 2024-01-14 RX ADMIN — FLUTICASONE PROPIONATE 100 MCG: 50 SPRAY, METERED NASAL at 11:01

## 2024-01-14 RX ADMIN — TRAMADOL HYDROCHLORIDE 50 MG: 50 TABLET, COATED ORAL at 09:01

## 2024-01-14 RX ADMIN — GABAPENTIN 100 MG: 100 CAPSULE ORAL at 08:01

## 2024-01-14 RX ADMIN — INSULIN ASPART 4 UNITS: 100 INJECTION, SOLUTION INTRAVENOUS; SUBCUTANEOUS at 12:01

## 2024-01-14 RX ADMIN — SENNOSIDES AND DOCUSATE SODIUM 2 TABLET: 8.6; 5 TABLET ORAL at 08:01

## 2024-01-14 RX ADMIN — INSULIN ASPART 2 UNITS: 100 INJECTION, SOLUTION INTRAVENOUS; SUBCUTANEOUS at 09:01

## 2024-01-14 RX ADMIN — ACETAMINOPHEN 1000 MG: 500 TABLET ORAL at 08:01

## 2024-01-14 RX ADMIN — Medication: at 09:01

## 2024-01-14 RX ADMIN — WARFARIN SODIUM 5 MG: 5 TABLET ORAL at 04:01

## 2024-01-14 RX ADMIN — ACETAMINOPHEN 1000 MG: 500 TABLET ORAL at 03:01

## 2024-01-14 RX ADMIN — VENLAFAXINE 37.5 MG: 37.5 TABLET ORAL at 08:01

## 2024-01-14 RX ADMIN — FAMOTIDINE 20 MG: 20 TABLET ORAL at 08:01

## 2024-01-14 RX ADMIN — Medication 2000 UNITS: at 08:01

## 2024-01-14 RX ADMIN — TRAZODONE HYDROCHLORIDE 50 MG: 50 TABLET ORAL at 09:01

## 2024-01-14 RX ADMIN — AMIODARONE HYDROCHLORIDE 400 MG: 200 TABLET ORAL at 08:01

## 2024-01-14 RX ADMIN — GABAPENTIN 100 MG: 100 CAPSULE ORAL at 09:01

## 2024-01-14 NOTE — CARE UPDATE
Care Update:     No acute events overnight. Patient in room 312/312 A. Blood glucose stable on current inpatient regimen. 41 Days Post-Op    Steroid use- None.   Renal function-   Lab Results   Component Value Date    CREATININE 5.1 (H) 01/14/2024        Diet Minced & Moist (IDDSI Level 5) Honey Thick (Moderately Thick)     POCT Glucose   Date Value Ref Range Status   01/13/2024 94 70 - 110 mg/dL Final   01/13/2024 176 (H) 70 - 110 mg/dL Final   01/13/2024 212 (H) 70 - 110 mg/dL Final   01/13/2024 133 (H) 70 - 110 mg/dL Final   01/12/2024 148 (H) 70 - 110 mg/dL Final   01/12/2024 127 (H) 70 - 110 mg/dL Final   01/12/2024 169 (H) 70 - 110 mg/dL Final   01/12/2024 165 (H) 70 - 110 mg/dL Final   01/12/2024 165 (H) 70 - 110 mg/dL Final   01/12/2024 183 (H) 70 - 110 mg/dL Final   01/12/2024 144 (H) 70 - 110 mg/dL Final   01/11/2024 203 (H) 70 - 110 mg/dL Final   01/11/2024 86 70 - 110 mg/dL Final   01/11/2024 146 (H) 70 - 110 mg/dL Final   01/11/2024 138 (H) 70 - 110 mg/dL Final   01/11/2024 165 (H) 70 - 110 mg/dL Final     Lab Results   Component Value Date    HGBA1C 5.1 01/13/2024         Home Medications: Metformin (off since October)       Endocrine  Type 2 diabetes mellitus without complication, without long-term current use of insulin  BG goal 140-180.      - MDC (150/50) prn AC/HS   - POCT Glucose every AC/HS  - Hypoglycemia protocol in place      ** Please notify Endocrine for any change and/or advance in diet**  ** Please call Endocrine for any BG related issues **     Discharge Planning:   TBD. Please notify endocrinology prior to dischar      Erasmo Parrish DNP, FNP-C  Department of Endocrinology  Inpatient Glycemic Management

## 2024-01-14 NOTE — PROGRESS NOTES
[facility] - Cardiology Stepdown  Heart Transplant  Progress Note    Patient Name: Radha Abbott  MRN: [id number]  Admission Date: 11/9/2023  Hospital Length of Stay: 66 days  Attending Physician: Sandhya Price MD  Primary Care Provider: Vasu Kong MD  Principal Problem:LVAD (left ventricular assist device) present    Subjective:   Interval History: Patient reports right hip pain is better with Tramadol addition. He reported increase in appetite and food intake yesterday; had lots of outside food from family that he enjoyed.  He had no new complaints this morning.  He is net negative 35cc in the last 24 hours and still looks euvolemic on exam.  Last HD session was 1/10.  BUN/creatinine: 90/5.1 and Nephrology following, patient has made 250cc of urine in the last 24 hours.  INR is 1.9.  Patient looks good today and no change in regimen for today.     Continuous Infusions:   sodium chloride 0.9%       Scheduled Meds:   acetaminophen  1,000 mg Per NG tube TID    alteplase  2 mg Intra-Catheter Once    amiodarone  400 mg Oral Daily    balsam peru-castor oiL   Topical (Top) BID    cholecalciferol (vitamin D3)  2,000 Units Per NG tube Daily    epoetin zohaib (PROCRIT) injection  50 Units/kg Subcutaneous Every Tues, Thurs, Sat    famotidine  20 mg Oral Daily    fluticasone propionate  2 spray Each Nostril Daily    gabapentin  100 mg Oral BID    mirtazapine  7.5 mg Oral Nightly    polyethylene glycol  17 g Oral Daily    psyllium husk (aspartame)  1 packet Oral Daily    senna-docusate 8.6-50 mg  2 tablet Oral Daily    traZODone  50 mg Oral QHS    venlafaxine  37.5 mg Oral Daily    warfarin  5 mg Oral Daily     PRN Meds:0.9%  NaCl infusion (for blood administration), bisacodyL, dextrose 10%, dextrose 10%, dextrose 10%, dextrose 10%, glucagon (human recombinant), glucose, glucose, heparin (porcine), hydrALAZINE, insulin aspart U-100, ondansetron, Flushing PICC/Midline Protocol **AND** [DISCONTINUED] sodium chloride 0.9%  **AND** sodium chloride 0.9%, traMADoL, zolpidem    Review of patient's allergies indicates:  No Known Allergies  Objective:     Vital Signs (Most Recent):  Temp: 98.1 °F (36.7 °C) (01/14/24 0730)  Pulse: 99 (01/14/24 0730)  Resp: 18 (01/14/24 0949)  BP: (!) 84/0 (01/14/24 0730)  SpO2: (!) 93 % (01/14/24 0730) Vital Signs (24h Range):  Temp:  [97.6 °F (36.4 °C)-98.5 °F (36.9 °C)] 98.1 °F (36.7 °C)  Pulse:  [] 99  Resp:  [16-18] 18  SpO2:  [92 %-100 %] 93 %  BP: (76-93)/(0-68) 84/0     Patient Vitals for the past 72 hrs (Last 3 readings):   Weight   01/13/24 0900 68.3 kg (150 lb 9.2 oz)   01/13/24 0400 68.3 kg (150 lb 9.2 oz)   01/12/24 0908 55.3 kg (121 lb 14.6 oz)       Body mass index is 20.42 kg/m².      Intake/Output Summary (Last 24 hours) at 1/14/2024 1023  Last data filed at 1/14/2024 0020  Gross per 24 hour   Intake 125 ml   Output 150 ml   Net -25 ml         Hemodynamic Parameters:       Telemetry: SR       Physical Exam  Constitutional:       Comments: Cachectic, temporal wasting   HENT:      Head: Normocephalic and atraumatic.   Eyes:      Conjunctiva/sclera: Conjunctivae normal.      Pupils: Pupils are equal, round, and reactive to light.   Neck:      Comments: Do not appreciate elevated JVP  Cardiovascular:      Rate and Rhythm: Normal rate and regular rhythm.      Comments: Smooth VAD hum  Pulmonary:      Effort: Pulmonary effort is normal.      Breath sounds: Normal breath sounds.   Abdominal:      General: Bowel sounds are normal.      Palpations: Abdomen is soft.   Musculoskeletal:         General: No swelling. Normal range of motion.      Cervical back: Normal range of motion and neck supple.   Skin:     General: Skin is warm and dry.      Capillary Refill: Capillary refill takes 2 to 3 seconds.   Neurological:      General: No focal deficit present.      Mental Status: He is alert and oriented to person, place, and time.            Significant Labs:  CBC:  Recent Labs   Lab 01/12/24  0535  01/13/24  0307 01/14/24  0508   WBC 9.64 9.54 9.08   RBC 2.64* 2.63* 2.38*   HGB 7.5* 7.4* 6.9*   HCT 24.4* 24.1* 21.8*    388 411   MCV 92 92 92   MCH 28.4 28.1 29.0   MCHC 30.7* 30.7* 31.7*       BNP:  Recent Labs   Lab 01/08/24  0433 01/10/24  0338 01/12/24  0535   * 683* 830*       CMP:  Recent Labs   Lab 01/12/24  0535 01/13/24  0306 01/14/24  0508   * 123* 94   CALCIUM 8.8 8.9 9.2   ALBUMIN 1.9* 1.9* 1.9*   PROT 7.0 7.0 7.2   * 136 136   K 3.5 3.6 4.1   CO2 24 23 23    101 103   BUN 74* 87* 90*   CREATININE 4.2* 4.8* 5.1*   ALKPHOS 147* 132 131   ALT 22 20 23   AST 25 23 27   BILITOT 0.2 0.3 0.3        Coagulation:   Recent Labs   Lab 01/12/24  0535 01/13/24  0307 01/14/24  0508   INR 1.8* 1.8* 1.9*       LDH:  Recent Labs   Lab 01/12/24  0535 01/13/24  0307 01/14/24  0508   * 294* 300*       Microbiology:  Microbiology Results (last 7 days)       Procedure Component Value Units Date/Time    Fungus culture [[id number]] Collected: 12/06/23 1532    Order Status: Completed Specimen: Body Fluid from Lung, Right Updated: 01/09/24 1154     Fungus (Mycology) Culture No fungus isolated after 4 weeks    Narrative:      Bronchial Wash    Throat culture [[id number]]  (Abnormal)  (Susceptibility) Collected: 01/04/24 1942    Order Status: Completed Specimen: Throat Updated: 01/08/24 1348     RESPIRATORY CULTURE - THROAT PSEUDOMONAS AERUGINOSA   Many  Normal respiratory bobby also present              I have reviewed all pertinent labs within the past 24 hours.    Estimated Creatinine Clearance: 14.7 mL/min (A) (based on SCr of 5.1 mg/dL (H)).    Diagnostic Results:  I have reviewed and interpreted all pertinent imaging results/findings within the past 24 hours.  Assessment and Plan:     63 year old male with hx of CAD s/p 3v CABG (unclear anatomy, 2009), ICM with a recent EF of 15-20% s/p ICD (elsa 2009), DM2 (a1c 7.7), HTN, HLD, Vfib on amio who presents to the ED with CC of  SOB     [place] [facility].  He was started on a Lasix gtt and did well.  Started on gDMT and discharged home on  5 with plans to follow up in [facility] clinic for ongoing transplant evaluation at another facility.  He says that about a few days ago he started to notice LE swelling.  This turned into Nelson and orthopnea.  He says he can't walk to the bathroom without getting SOB.  He also complains of weight gain.  He takes torsemide 20mg BID at home and was told to trial additional Lasix which he did without any improvement.  He was rx metolazone but has not been filled.  He came to the ED     In the ED he was AF with stable VS on RA.  CBC showing chronic anemia.  CMP notable for acute on chronic CKD with baseline around 2.1 and Cr now 2.6.  BNP elevated.  Lactate neg.  CXR showing pulmonary edema.  I evaluated the pt at the bedside.  Bedside TTE showing CVP >15.  He was subsequently admitted to the CCU for diuresis.     He was continued on his home  and was started on a lasix gtt, which he responded well to overnight (net -1700cc. He feels much better this morning as well. Our transplant coordinators have been working on insurance approval and he is now being transferred to [facility] service for transplant evaluation.     * LVAD (left ventricular assist device) present  -HeartMate 3 Implanted  12/1/2023  as DT  -[facility] Primary  -Implanted by Dr. Washington  -Continue Coumadin, dosing by PharmD.  Goal INR 2.0-3.0 . Subtherapeutic today.   -Antiplatelets Not on ASA  -LDH is stable overall today. Will continue to monitor daily.  -Weaned off midodrine.  -Speed set at  4900   rpm, LSL 4500 rpm   -Interrogation notable for no events  -ECHO 1/2/24 mild TR, LVEDD 6.25 with HM3 set to 4900 rpm   -Not listed for OHTx, declined for OHTX due to comorbidities   -PT/OT/Speech. Recommending rehab but HD + LVAD may be barrier      Procedure: Device Interrogation Including analysis of device parameters  Current  Settings: Ventricular Assist Device  Review of device function is stable/unstable stable        1/12/2024    12:06 PM 1/12/2024     8:03 AM 1/12/2024     3:44 AM 1/11/2024    11:31 PM 1/11/2024     8:19 PM 1/11/2024     4:03 PM 1/11/2024    11:56 AM   TXP LVAD INTERROGATIONS   Type HeartMate3 HeartMate3 HeartMate3 HeartMate3 HeartMate3 HeartMate3 HeartMate3   Flow 4 4 4 3.7 3.7 3.8 4.2   Speed 4900 4900 4900 4950 4900 4900 4900   PI 4.1 3.5 3.8 4.4 5.9 5.3 3.7   Power ([place]) 3.2 3.3 3.3 3.3 3.4 3.3 3.4   LSL 4500 4500 4500 4500 4500 4500 4500   Pulsatility   Intermittent pulse             Acute on chronic combined systolic and diastolic CHF, NYHA class 4  Pt with known ICM with an EF of 25% on home  presented with decompensated HF.    -S/P LVAD    Acute renal failure with acute tubular necrosis superimposed on stage 3b chronic kidney disease  IVÁN on CKD2. Baseline Cr of 1.7-2.0.   - Hold ARNi  -Trend BMPs daily   -Nephrology following   -SLED/SCUF for volume removal began on 12/1. He is anuric  -Did not respond to diuretic challenge with 240 mg IV Lasix, 1000 mg Diuril, and 500 mg IV Diamox done 12/14  -Diuretic challenge 1/8 with 160 mg IVP Lasix,   -total  cc/24 hours   -Now on HD TThSat; but last HD was on 1/10  -Tunneled trialysis line placed 12/22 by IR    Right parotid mass  - 2.2cm R. Parotid mass noted.   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 Can follow up as outpatient w/ ENT for FNA. No further interventions needed as inpatient.     Lymphadenopathy  +ill contacts  Respiratory panel negative  Strep test negative.   Throat culture showing pseudomonas. As per ID , will treat w/ 5 day course of zosyn (therapy completed 1/10)    Moderate malnutrition  -RD and SLP following  -Tolerating tube feeds. Total daily caloric intake increased to 2160  -Failed MBBS 12/20, continue TF.  -Speech following, appreciate their continued recommendations     Ventricular tachycardia  Hx VT s/p ICD  placement (medtronic 2009)  - Continue home amio 400mg qd    PAF (paroxysmal atrial fibrillation)  Known hx of pAF. In sinus rhythm on admission, but 1 run of AF RVR overnight. He spontaneously converted.  - Continue home amiodarone 400mg qd  - Continue AC, on hold with supratherapeutic INR       CAD (coronary artery disease)  -S/p 3vCABG in 2009  -Lipitor on hold 2/2 mild transaminitis  -Not on ASA post VAD    Type 2 diabetes mellitus without complication, without long-term current use of insulin  -A1c 7.6, not insulin dependent  - Endocrine following, appreciate assistance with blood glucose management    Dysphonia  -Speech following closely  -Appreciate ENT's help. S/P augmentation of paralyzed left vocal cord 12/18    Depressed mood  -Psychiatry consulted for depressed mood, SI when left alone  -Recommend sitter when alone (possibly with transition to stepdown).  -Psych reconsulted 1/2, agree with venlafaxine and trazadone    Unilateral complete vocal fold paralysis  -Appreciate ENT's help  -S/P augmentation of paralyzed left vocal cord 12/18    IVÁN (acute kidney injury)  -Nephrology following          DENIZ Faye  Heart Transplant  [facility] - Cardiology Stepdown

## 2024-01-14 NOTE — PROGRESS NOTES
01/13/2024  Deni Frye    Current provider:  Sandhya Price MD    Device interrogation:      1/13/2024     5:02 PM 1/13/2024    12:00 PM 1/13/2024     8:00 AM 1/13/2024     5:00 AM 1/13/2024    12:52 AM 1/12/2024     8:00 PM 1/12/2024     4:10 PM   TXP LVAD INTERROGATIONS   Type HeartMate3 HeartMate3 HeartMate3 HeartMate3 HeartMate3 HeartMate3 HeartMate3   Flow 4.1 4 4.1 3.8 3.8 3.7 3.6   Speed 4900 4900 4900 4900 4900 4900 4900   PI 4.8 4.6 4.8 4.9 4.9 5.3 5.5   Power ([place]) 3.3 3.3 3.3 3.3 3.3 3.3 3.3   LSL 4500 4500 4500 4500 4500 4500 4500   Low Flow Alarm no no no       High Power Alarm no no no              [facility] to ensure all mechanical assist device settings (IABP or VAD) were appropriate and all parameters were within limits.  I was able to ensure all back up equipment was present, the staff had no issues, and the Perfusion Department daily rounding was complete.      For implantable VADs: Interrogation of Ventricular assist device was performed with analysis of device parameters and review of device function. I have personally reviewed the interrogation findings and agree with findings as stated.     In emergency, the nursing units have been notified to contact the perfusion department either by:  Calling [phone] from 630am to 4pm Mon thru Fri, utilizing the On-Call Finder functionality of Epic and searching for Perfusion, or by contacting the hospital  from 4pm to 630am and on weekends and asking to speak with the perfusionist on call.    8:10 PM

## 2024-01-14 NOTE — ASSESSMENT & PLAN NOTE
-RD and SLP following  -Tolerating tube feeds. Total daily caloric intake increased to 2160  -Failed MBBS 12/20, continue TF.  -Speech following, appreciate their continued recommendations

## 2024-01-14 NOTE — PROGRESS NOTES
Problem: Adult Inpatient Plan of Care  Goal: Plan of Care Review  Outcome: Ongoing, Progressing  Goal: Absence of Hospital-Acquired Illness or Injury  Outcome: Ongoing, Progressing  Goal: Optimal Comfort and Wellbeing  Outcome: Ongoing, Progressing     Problem: Diabetes Comorbidity  Goal: Blood Glucose Level Within Targeted Range  Outcome: Ongoing, Progressing     Problem: Fluid and Electrolyte Imbalance (Acute Kidney Injury/Impairment)  Goal: Fluid and Electrolyte Balance  Outcome: Ongoing, Progressing     Problem: Oral Intake Inadequate (Acute Kidney Injury/Impairment)  Goal: Optimal Nutrition Intake  Outcome: Ongoing, Progressing     Plan of care discussed with patient. Patient AOX4 and VSS. Patient needs assistance x1 to be OOB, fall precautions in place. Ice chips given for dysphagia exercise. LVAD DP and numbers WNL, smooth LVAD hum. Patient has complaints of hip pain, covered with tramadol PRN. Discussed medications and care. Patient has no questions at this time.       LVAD dressing change completed using sterile technique with new silver-on kit by pt's wife. DLES is a 1 with no drainage noted on the drain sponge. Tolerated without any complication. Driveline remains intact, no redness, or tenderness noted.      01/14/24 1031        VAD 12/01/23 1015 Left ventricular assist device HeartMate 3   Placement Date/Time: 12/01/23 1015   Present Prior to Hospital Arrival?: No  VAD Type: Left ventricular assist device  VAD Brand: HeartMate 3   Site Location Abdomen right   Site Assessment Clean;Dry;Intact   Driveline Exit Site 1   Driveline Assessment Free of Kinks;Intact;Modular cable Clean and Locked   Dressing Status Clean;Dry;Intact   Dressing Intervention Integrity maintained   Performed By Caregiver   Dressing Change Schedule Every 3 days   Dressing Change Due 01/17/24   Integrity dry;intact;no damage   Driveline [place] in use [place] carranza   Condition CDI

## 2024-01-14 NOTE — SUBJECTIVE & OBJECTIVE
Interval History: Patient reports right hip pain is better with Tramadol addition. He reported increase in appetite and food intake yesterday; had lots of outside food from family that he enjoyed.  He had no new complaints this morning.  He is net negative 35cc in the last 24 hours and still looks euvolemic on exam.  Last HD session was 1/10.  BUN/creatinine: 90/5.1 and Nephrology following.  INR is 1.9.  Patient looks good today and no change in regimen for today.     Continuous Infusions:   sodium chloride 0.9%       Scheduled Meds:   acetaminophen  1,000 mg Per NG tube TID    alteplase  2 mg Intra-Catheter Once    amiodarone  400 mg Oral Daily    balsam peru-castor oiL   Topical (Top) BID    cholecalciferol (vitamin D3)  2,000 Units Per NG tube Daily    epoetin zohaib (PROCRIT) injection  50 Units/kg Subcutaneous Every Tues, Thurs, Sat    famotidine  20 mg Oral Daily    fluticasone propionate  2 spray Each Nostril Daily    gabapentin  100 mg Oral BID    mirtazapine  7.5 mg Oral Nightly    polyethylene glycol  17 g Oral Daily    psyllium husk (aspartame)  1 packet Oral Daily    senna-docusate 8.6-50 mg  2 tablet Oral Daily    traZODone  50 mg Oral QHS    venlafaxine  37.5 mg Oral Daily    warfarin  5 mg Oral Daily     PRN Meds:0.9%  NaCl infusion (for blood administration), bisacodyL, dextrose 10%, dextrose 10%, dextrose 10%, dextrose 10%, glucagon (human recombinant), glucose, glucose, heparin (porcine), hydrALAZINE, insulin aspart U-100, ondansetron, Flushing PICC/Midline Protocol **AND** [DISCONTINUED] sodium chloride 0.9% **AND** sodium chloride 0.9%, traMADoL, zolpidem    Review of patient's allergies indicates:  No Known Allergies  Objective:     Vital Signs (Most Recent):  Temp: 98.1 °F (36.7 °C) (01/14/24 0730)  Pulse: 99 (01/14/24 0730)  Resp: 18 (01/14/24 0949)  BP: (!) 84/0 (01/14/24 0730)  SpO2: (!) 93 % (01/14/24 0730) Vital Signs (24h Range):  Temp:  [97.6 °F (36.4 °C)-98.5 °F (36.9 °C)] 98.1 °F (36.7  °C)  Pulse:  [] 99  Resp:  [16-18] 18  SpO2:  [92 %-100 %] 93 %  BP: (76-93)/(0-68) 84/0     Patient Vitals for the past 72 hrs (Last 3 readings):   Weight   01/13/24 0900 68.3 kg (150 lb 9.2 oz)   01/13/24 0400 68.3 kg (150 lb 9.2 oz)   01/12/24 0908 55.3 kg (121 lb 14.6 oz)       Body mass index is 20.42 kg/m².      Intake/Output Summary (Last 24 hours) at 1/14/2024 1023  Last data filed at 1/14/2024 0020  Gross per 24 hour   Intake 125 ml   Output 150 ml   Net -25 ml         Hemodynamic Parameters:       Telemetry: SR       Physical Exam  Constitutional:       Comments: Cachectic, temporal wasting   HENT:      Head: Normocephalic and atraumatic.   Eyes:      Conjunctiva/sclera: Conjunctivae normal.      Pupils: Pupils are equal, round, and reactive to light.   Neck:      Comments: Do not appreciate elevated JVP  Cardiovascular:      Rate and Rhythm: Normal rate and regular rhythm.      Comments: Smooth VAD hum  Pulmonary:      Effort: Pulmonary effort is normal.      Breath sounds: Normal breath sounds.   Abdominal:      General: Bowel sounds are normal.      Palpations: Abdomen is soft.   Musculoskeletal:         General: No swelling. Normal range of motion.      Cervical back: Normal range of motion and neck supple.   Skin:     General: Skin is warm and dry.      Capillary Refill: Capillary refill takes 2 to 3 seconds.   Neurological:      General: No focal deficit present.      Mental Status: He is alert and oriented to person, place, and time.            Significant Labs:  CBC:  Recent Labs   Lab 01/12/24  0535 01/13/24  0307 01/14/24  0508   WBC 9.64 9.54 9.08   RBC 2.64* 2.63* 2.38*   HGB 7.5* 7.4* 6.9*   HCT 24.4* 24.1* 21.8*    388 411   MCV 92 92 92   MCH 28.4 28.1 29.0   MCHC 30.7* 30.7* 31.7*       BNP:  Recent Labs   Lab 01/08/24  0433 01/10/24  0338 01/12/24  0535   * 683* 830*       CMP:  Recent Labs   Lab 01/12/24  0535 01/13/24  0306 01/14/24  0508   * 123* 94   CALCIUM  8.8 8.9 9.2   ALBUMIN 1.9* 1.9* 1.9*   PROT 7.0 7.0 7.2   * 136 136   K 3.5 3.6 4.1   CO2 24 23 23    101 103   BUN 74* 87* 90*   CREATININE 4.2* 4.8* 5.1*   ALKPHOS 147* 132 131   ALT 22 20 23   AST 25 23 27   BILITOT 0.2 0.3 0.3        Coagulation:   Recent Labs   Lab 01/12/24  0535 01/13/24  0307 01/14/24  0508   INR 1.8* 1.8* 1.9*       LDH:  Recent Labs   Lab 01/12/24  0535 01/13/24  0307 01/14/24  0508   * 294* 300*       Microbiology:  Microbiology Results (last 7 days)       Procedure Component Value Units Date/Time    Fungus culture [[id number]] Collected: 12/06/23 1532    Order Status: Completed Specimen: Body Fluid from Lung, Right Updated: 01/09/24 1154     Fungus (Mycology) Culture No fungus isolated after 4 weeks    Narrative:      Bronchial Wash    Throat culture [[id number]]  (Abnormal)  (Susceptibility) Collected: 01/04/24 1942    Order Status: Completed Specimen: Throat Updated: 01/08/24 1348     RESPIRATORY CULTURE - THROAT PSEUDOMONAS AERUGINOSA   Many  Normal respiratory bobby also present              I have reviewed all pertinent labs within the past 24 hours.    Estimated Creatinine Clearance: 14.7 mL/min (A) (based on SCr of 5.1 mg/dL (H)).    Diagnostic Results:  I have reviewed and interpreted all pertinent imaging results/findings within the past 24 hours.

## 2024-01-14 NOTE — PLAN OF CARE
RN visualized Pt being able to reposition self safely in bed (side to side) independently, with minimal to no assist. Pt educated on skin safety and to inform staff of any needs and assistance. Muscle strengthening encouraged. Pt able to make needs known. Call light within pt/spouse reach. Wife at bedside. Will continue plan of care.     Pt wanted to take most meds without being crushed and no thickener. Pt and spouse educated on aspiration precautions. Bedside swallow test completed with RN. Pt was able to swallow sips of water (no straw) with small pills then crushed larger pills in pudding. Pt tolerated well. VSS. No signs of distress. aquilino Nguyễn made aware. No new orders. Will continue plan of care.   Problem: Adult Inpatient Plan of Care  Goal: Plan of Care Review  Outcome: Ongoing, Progressing  Goal: Patient-Specific Goal (Individualized)  Outcome: Ongoing, Progressing  Goal: Absence of Hospital-Acquired Illness or Injury  Outcome: Ongoing, Progressing  Goal: Optimal Comfort and Wellbeing  Outcome: Ongoing, Progressing  Goal: Readiness for Transition of Care  Outcome: Ongoing, Progressing     Problem: Diabetes Comorbidity  Goal: Blood Glucose Level Within Targeted Range  Outcome: Ongoing, Progressing     Problem: Fluid and Electrolyte Imbalance (Acute Kidney Injury/Impairment)  Goal: Fluid and Electrolyte Balance  Outcome: Ongoing, Progressing     Problem: Oral Intake Inadequate (Acute Kidney Injury/Impairment)  Goal: Optimal Nutrition Intake  Outcome: Ongoing, Progressing     Problem: Renal Function Impairment (Acute Kidney Injury/Impairment)  Goal: Effective Renal Function  Outcome: Ongoing, Progressing     Problem: Infection  Goal: Absence of Infection Signs and Symptoms  Outcome: Ongoing, Progressing     Problem: Adjustment to Illness (Heart Failure)  Goal: Optimal Coping  Outcome: Ongoing, Progressing     Problem: Cardiac Output Decreased (Heart Failure)  Goal: Optimal Cardiac Output  Outcome:  Ongoing, Progressing     Problem: Dysrhythmia (Heart Failure)  Goal: Stable Heart Rate and Rhythm  Outcome: Ongoing, Progressing     Problem: Fluid Imbalance (Heart Failure)  Goal: Fluid Balance  Outcome: Ongoing, Progressing     Problem: Functional Ability Impaired (Heart Failure)  Goal: Optimal Functional Ability  Outcome: Ongoing, Progressing     Problem: Oral Intake Inadequate (Heart Failure)  Goal: Optimal Nutrition Intake  Outcome: Ongoing, Progressing     Problem: Respiratory Compromise (Heart Failure)  Goal: Effective Oxygenation and Ventilation  Outcome: Ongoing, Progressing     Problem: Sleep Disordered Breathing (Heart Failure)  Goal: Effective Breathing Pattern During Sleep  Outcome: Ongoing, Progressing     Problem: Cardiac Output Decreased  Goal: Effective Cardiac Output  Outcome: Ongoing, Progressing     Problem: Fall Injury Risk  Goal: Absence of Fall and Fall-Related Injury  Outcome: Ongoing, Progressing     Problem: Coping Ineffective  Goal: Effective Coping  Outcome: Ongoing, Progressing     Problem: Skin Injury Risk Increased  Goal: Skin Health and Integrity  Outcome: Ongoing, Progressing     Problem: Adjustment to Device (Ventricular Assist Device)  Goal: Optimal Adjustment to Device  Outcome: Ongoing, Progressing     Problem: Bleeding (Ventricular Assist Device)  Goal: Absence of Bleeding  Outcome: Ongoing, Progressing     Problem: Embolism (Ventricular Assist Device)  Goal: Absence of Embolism Signs and Symptoms  Outcome: Ongoing, Progressing     Problem: Hemodynamic Instability (Ventricular Assist Device)  Goal: Optimal Blood Flow  Outcome: Ongoing, Progressing     Problem: Infection (Ventricular Assist Device)  Goal: Absence of Infection Signs and Symptoms  Outcome: Ongoing, Progressing     Problem: Right-Sided Heart Compromise (Ventricular Assist Device)  Goal: Effective Right-Sided Heart Function  Outcome: Ongoing, Progressing     Problem: Adjustment to Illness (Sepsis/Septic Shock)  Goal:  Optimal Coping  Outcome: Ongoing, Progressing     Problem: Bleeding (Sepsis/Septic Shock)  Goal: Absence of Bleeding  Outcome: Ongoing, Progressing     Problem: Glycemic Control Impaired (Sepsis/Septic Shock)  Goal: Blood Glucose Level Within Desired Range  Outcome: Ongoing, Progressing     Problem: Infection Progression (Sepsis/Septic Shock)  Goal: Absence of Infection Signs and Symptoms  Outcome: Ongoing, Progressing     Problem: Nutrition Impaired (Sepsis/Septic Shock)  Goal: Optimal Nutrition Intake  Outcome: Ongoing, Progressing     Problem: Impaired Wound Healing  Goal: Optimal Wound Healing  Outcome: Ongoing, Progressing     Problem: Device-Related Complication Risk (Hemodialysis)  Goal: Safe, Effective Therapy Delivery  Outcome: Ongoing, Progressing     Problem: Hemodynamic Instability (Hemodialysis)  Goal: Effective Tissue Perfusion  Outcome: Ongoing, Progressing     Problem: Infection (Hemodialysis)  Goal: Absence of Infection Signs and Symptoms  Outcome: Ongoing, Progressing     Problem: Bariatric Environmental Safety  Goal: Safety Maintained with Care  Outcome: Ongoing, Progressing

## 2024-01-14 NOTE — ASSESSMENT & PLAN NOTE
IVÁN on CKD2. Baseline Cr of 1.7-2.0.   - Hold ARNi  -Trend BMPs daily   -Nephrology following   -SLED/SCUF for volume removal began on 12/1. He is anuric  -Did not respond to diuretic challenge with 240 mg IV Lasix, 1000 mg Diuril, and 500 mg IV Diamox done 12/14  -Diuretic challenge 1/8 with 160 mg IVP Lasix,   -total  cc/24 hours   -Now on HD TThSat; but last HD was on 1/10  -Tunneled trialysis line placed 12/22 by IR

## 2024-01-14 NOTE — CARE UPDATE
-Glucose Goal 140-180    -A1C:   Hemoglobin A1C   Date Value Ref Range Status   01/13/2024 5.1 4.0 - 5.6 % Final     Comment:     ADA Screening Guidelines:  5.7-6.4%  Consistent with prediabetes  >or=6.5%  Consistent with diabetes    High levels of fetal hemoglobin interfere with the HbA1C  assay. Heterozygous hemoglobin variants (HbS, HgC, etc)do  not significantly interfere with this assay.   However, presence of multiple variants may affect accuracy.         -HOME REGIMEN: Metformin (off since October)     -GLUCOSE TREND FOR THE PAST 24HRS:   Recent Labs   Lab 01/12/24 2019 01/12/24  2335 01/13/24  0452 01/13/24  1225 01/13/24  1617 01/13/24  1959   POCTGLUCOSE 127* 148* 133* 212* 176* 94         -NO HYPOGYCEMIAS NOTED     - Diet  Diet Minced & Moist (IDDSI Level 5) Honey Thick (Moderately Thick)    -TOLERATING 50 % OF PO DIET     Endocrine  Type 2 diabetes mellitus without complication, without long-term current use of insulin  BG goal 140-180.      - MDC (150/50) prn AC/HS  - POCT Glucose every AC/HS  - Hypoglycemia protocol in place      ** Please notify Endocrine for any change and/or advance in diet**  ** Please call Endocrine for any BG related issues **     Discharge Planning:   TBD. Please notify endocrinology prior to discharge.     Discharge Planning:   TBD. Please notify endocrinology prior to discharge.      Erasmo Parrish DNP, FNP-C  Department of Endocrinology  Inpatient Glycemic Management

## 2024-01-15 LAB
ALBUMIN SERPL BCP-MCNC: 2 G/DL (ref 3.5–5.2)
ALP SERPL-CCNC: 137 U/L (ref 55–135)
ALT SERPL W/O P-5'-P-CCNC: 29 U/L (ref 10–44)
ANION GAP SERPL CALC-SCNC: 11 MMOL/L (ref 8–16)
AST SERPL-CCNC: 33 U/L (ref 10–40)
BASOPHILS # BLD AUTO: 0.05 K/UL (ref 0–0.2)
BASOPHILS NFR BLD: 0.7 % (ref 0–1.9)
BILIRUB DIRECT SERPL-MCNC: 0.1 MG/DL (ref 0.1–0.3)
BILIRUB SERPL-MCNC: 0.2 MG/DL (ref 0.1–1)
BNP SERPL-MCNC: 748 PG/ML (ref 0–99)
BUN SERPL-MCNC: 100 MG/DL (ref 8–23)
CA-I BLDV-SCNC: 1.15 MMOL/L (ref 1.06–1.42)
CALCIUM SERPL-MCNC: 8.8 MG/DL (ref 8.7–10.5)
CHLORIDE SERPL-SCNC: 104 MMOL/L (ref 95–110)
CO2 SERPL-SCNC: 24 MMOL/L (ref 23–29)
CREAT SERPL-MCNC: 4.8 MG/DL (ref 0.5–1.4)
CRP SERPL-MCNC: 199.4 MG/L (ref 0–8.2)
DIFFERENTIAL METHOD BLD: ABNORMAL
EOSINOPHIL # BLD AUTO: 0.2 K/UL (ref 0–0.5)
EOSINOPHIL NFR BLD: 3.5 % (ref 0–8)
ERYTHROCYTE [DISTWIDTH] IN BLOOD BY AUTOMATED COUNT: 17 % (ref 11.5–14.5)
EST. GFR  (NO RACE VARIABLE): 13 ML/MIN/1.73 M^2
GLUCOSE SERPL-MCNC: 93 MG/DL (ref 70–110)
HCT VFR BLD AUTO: 22.8 % (ref 40–54)
HGB BLD-MCNC: 7 G/DL (ref 14–18)
IMM GRANULOCYTES # BLD AUTO: 0.03 K/UL (ref 0–0.04)
IMM GRANULOCYTES NFR BLD AUTO: 0.4 % (ref 0–0.5)
INR PPP: 2.1 (ref 0.8–1.2)
LDH SERPL L TO P-CCNC: 297 U/L (ref 110–260)
LYMPHOCYTES # BLD AUTO: 0.8 K/UL (ref 1–4.8)
LYMPHOCYTES NFR BLD: 12.2 % (ref 18–48)
MAGNESIUM SERPL-MCNC: 2.2 MG/DL (ref 1.6–2.6)
MCH RBC QN AUTO: 28.1 PG (ref 27–31)
MCHC RBC AUTO-ENTMCNC: 30.7 G/DL (ref 32–36)
MCV RBC AUTO: 92 FL (ref 82–98)
MONOCYTES # BLD AUTO: 0.6 K/UL (ref 0.3–1)
MONOCYTES NFR BLD: 9.3 % (ref 4–15)
NEUTROPHILS # BLD AUTO: 5 K/UL (ref 1.8–7.7)
NEUTROPHILS NFR BLD: 73.9 % (ref 38–73)
NRBC BLD-RTO: 0 /100 WBC
PHOSPHATE SERPL-MCNC: 5.3 MG/DL (ref 2.7–4.5)
PLATELET # BLD AUTO: 401 K/UL (ref 150–450)
PMV BLD AUTO: 9.1 FL (ref 9.2–12.9)
POCT GLUCOSE: 149 MG/DL (ref 70–110)
POCT GLUCOSE: 155 MG/DL (ref 70–110)
POCT GLUCOSE: 159 MG/DL (ref 70–110)
POCT GLUCOSE: 169 MG/DL (ref 70–110)
POTASSIUM SERPL-SCNC: 4.1 MMOL/L (ref 3.5–5.1)
PREALB SERPL-MCNC: 31 MG/DL (ref 20–43)
PROT SERPL-MCNC: 7.2 G/DL (ref 6–8.4)
PROTHROMBIN TIME: 21.8 SEC (ref 9–12.5)
RBC # BLD AUTO: 2.49 M/UL (ref 4.6–6.2)
SODIUM SERPL-SCNC: 139 MMOL/L (ref 136–145)
WBC # BLD AUTO: 6.81 K/UL (ref 3.9–12.7)

## 2024-01-15 PROCEDURE — 99233 SBSQ HOSP IP/OBS HIGH 50: CPT | Mod: ,,, | Performed by: INTERNAL MEDICINE

## 2024-01-15 PROCEDURE — 25000003 PHARM REV CODE 250: Performed by: STUDENT IN AN ORGANIZED HEALTH CARE EDUCATION/TRAINING PROGRAM

## 2024-01-15 PROCEDURE — 27000248 HC VAD-ADDITIONAL DAY

## 2024-01-15 PROCEDURE — 97530 THERAPEUTIC ACTIVITIES: CPT

## 2024-01-15 PROCEDURE — 25000003 PHARM REV CODE 250: Performed by: INTERNAL MEDICINE

## 2024-01-15 PROCEDURE — 85025 COMPLETE CBC W/AUTO DIFF WBC: CPT | Performed by: PHYSICIAN ASSISTANT

## 2024-01-15 PROCEDURE — 83735 ASSAY OF MAGNESIUM: CPT | Performed by: INTERNAL MEDICINE

## 2024-01-15 PROCEDURE — 97535 SELF CARE MNGMENT TRAINING: CPT

## 2024-01-15 PROCEDURE — 83880 ASSAY OF NATRIURETIC PEPTIDE: CPT | Performed by: STUDENT IN AN ORGANIZED HEALTH CARE EDUCATION/TRAINING PROGRAM

## 2024-01-15 PROCEDURE — 83615 LACTATE (LD) (LDH) ENZYME: CPT | Performed by: STUDENT IN AN ORGANIZED HEALTH CARE EDUCATION/TRAINING PROGRAM

## 2024-01-15 PROCEDURE — 20600001 HC STEP DOWN PRIVATE ROOM

## 2024-01-15 PROCEDURE — 25000003 PHARM REV CODE 250: Performed by: PHYSICIAN ASSISTANT

## 2024-01-15 PROCEDURE — 80048 BASIC METABOLIC PNL TOTAL CA: CPT | Performed by: INTERNAL MEDICINE

## 2024-01-15 PROCEDURE — 27000207 HC ISOLATION

## 2024-01-15 PROCEDURE — 82330 ASSAY OF CALCIUM: CPT | Performed by: INTERNAL MEDICINE

## 2024-01-15 PROCEDURE — 84134 ASSAY OF PREALBUMIN: CPT | Performed by: NURSE PRACTITIONER

## 2024-01-15 PROCEDURE — 84100 ASSAY OF PHOSPHORUS: CPT | Performed by: INTERNAL MEDICINE

## 2024-01-15 PROCEDURE — 80076 HEPATIC FUNCTION PANEL: CPT | Performed by: INTERNAL MEDICINE

## 2024-01-15 PROCEDURE — 85610 PROTHROMBIN TIME: CPT | Performed by: PHYSICIAN ASSISTANT

## 2024-01-15 PROCEDURE — 86140 C-REACTIVE PROTEIN: CPT | Performed by: STUDENT IN AN ORGANIZED HEALTH CARE EDUCATION/TRAINING PROGRAM

## 2024-01-15 PROCEDURE — 93750 INTERROGATION VAD IN PERSON: CPT | Mod: ,,, | Performed by: INTERNAL MEDICINE

## 2024-01-15 RX ORDER — POLYETHYLENE GLYCOL 3350 17 G/17G
17 POWDER, FOR SOLUTION ORAL DAILY
Status: DISCONTINUED | OUTPATIENT
Start: 2024-01-15 | End: 2024-01-24 | Stop reason: HOSPADM

## 2024-01-15 RX ADMIN — POLYETHYLENE GLYCOL 3350 17 G: 17 POWDER, FOR SOLUTION ORAL at 02:01

## 2024-01-15 RX ADMIN — MIRTAZAPINE 7.5 MG: 7.5 TABLET, FILM COATED ORAL at 08:01

## 2024-01-15 RX ADMIN — VENLAFAXINE 37.5 MG: 37.5 TABLET ORAL at 08:01

## 2024-01-15 RX ADMIN — SENNOSIDES AND DOCUSATE SODIUM 2 TABLET: 8.6; 5 TABLET ORAL at 08:01

## 2024-01-15 RX ADMIN — ACETAMINOPHEN 1000 MG: 500 TABLET ORAL at 08:01

## 2024-01-15 RX ADMIN — Medication: at 08:01

## 2024-01-15 RX ADMIN — GABAPENTIN 100 MG: 100 CAPSULE ORAL at 08:01

## 2024-01-15 RX ADMIN — Medication 2000 UNITS: at 08:01

## 2024-01-15 RX ADMIN — FLUTICASONE PROPIONATE 100 MCG: 50 SPRAY, METERED NASAL at 08:01

## 2024-01-15 RX ADMIN — TRAZODONE HYDROCHLORIDE 50 MG: 50 TABLET ORAL at 08:01

## 2024-01-15 RX ADMIN — WARFARIN SODIUM 5 MG: 5 TABLET ORAL at 04:01

## 2024-01-15 RX ADMIN — AMIODARONE HYDROCHLORIDE 400 MG: 200 TABLET ORAL at 08:01

## 2024-01-15 RX ADMIN — ACETAMINOPHEN 1000 MG: 500 TABLET ORAL at 02:01

## 2024-01-15 RX ADMIN — FAMOTIDINE 20 MG: 20 TABLET ORAL at 08:01

## 2024-01-15 NOTE — PLAN OF CARE
Problem: Adult Inpatient Plan of Care  Goal: Plan of Care Review  Outcome: Ongoing, Progressing  Goal: Patient-Specific Goal (Individualized)  Outcome: Ongoing, Progressing  Goal: Absence of Hospital-Acquired Illness or Injury  Outcome: Ongoing, Progressing  Goal: Optimal Comfort and Wellbeing  Outcome: Ongoing, Progressing  Goal: Readiness for Transition of Care  Outcome: Ongoing, Progressing     Problem: Diabetes Comorbidity  Goal: Blood Glucose Level Within Targeted Range  Outcome: Ongoing, Progressing     Problem: Fluid and Electrolyte Imbalance (Acute Kidney Injury/Impairment)  Goal: Fluid and Electrolyte Balance  Outcome: Ongoing, Progressing     Problem: Oral Intake Inadequate (Acute Kidney Injury/Impairment)  Goal: Optimal Nutrition Intake  Outcome: Ongoing, Progressing     Problem: Renal Function Impairment (Acute Kidney Injury/Impairment)  Goal: Effective Renal Function  Outcome: Ongoing, Progressing     Problem: Infection  Goal: Absence of Infection Signs and Symptoms  Outcome: Ongoing, Progressing     Problem: Adjustment to Illness (Heart Failure)  Goal: Optimal Coping  Outcome: Ongoing, Progressing     Problem: Cardiac Output Decreased (Heart Failure)  Goal: Optimal Cardiac Output  Outcome: Ongoing, Progressing     Problem: Dysrhythmia (Heart Failure)  Goal: Stable Heart Rate and Rhythm  Outcome: Ongoing, Progressing     Problem: Fluid Imbalance (Heart Failure)  Goal: Fluid Balance  Outcome: Ongoing, Progressing     Problem: Functional Ability Impaired (Heart Failure)  Goal: Optimal Functional Ability  Outcome: Ongoing, Progressing     Problem: Oral Intake Inadequate (Heart Failure)  Goal: Optimal Nutrition Intake  Outcome: Ongoing, Progressing     Problem: Respiratory Compromise (Heart Failure)  Goal: Effective Oxygenation and Ventilation  Outcome: Ongoing, Progressing     Problem: Sleep Disordered Breathing (Heart Failure)  Goal: Effective Breathing Pattern During Sleep  Outcome: Ongoing,  Progressing     Problem: Cardiac Output Decreased  Goal: Effective Cardiac Output  Outcome: Ongoing, Progressing     Problem: Fall Injury Risk  Goal: Absence of Fall and Fall-Related Injury  Outcome: Ongoing, Progressing     Problem: Coping Ineffective  Goal: Effective Coping  Outcome: Ongoing, Progressing     Problem: Skin Injury Risk Increased  Goal: Skin Health and Integrity  Outcome: Ongoing, Progressing     Problem: Adjustment to Device (Ventricular Assist Device)  Goal: Optimal Adjustment to Device  Outcome: Ongoing, Progressing     Problem: Bleeding (Ventricular Assist Device)  Goal: Absence of Bleeding  Outcome: Ongoing, Progressing     Problem: Embolism (Ventricular Assist Device)  Goal: Absence of Embolism Signs and Symptoms  Outcome: Ongoing, Progressing     Problem: Hemodynamic Instability (Ventricular Assist Device)  Goal: Optimal Blood Flow  Outcome: Ongoing, Progressing     Problem: Infection (Ventricular Assist Device)  Goal: Absence of Infection Signs and Symptoms  Outcome: Ongoing, Progressing     Problem: Right-Sided Heart Compromise (Ventricular Assist Device)  Goal: Effective Right-Sided Heart Function  Outcome: Ongoing, Progressing     Problem: Adjustment to Illness (Sepsis/Septic Shock)  Goal: Optimal Coping  Outcome: Ongoing, Progressing     Problem: Bleeding (Sepsis/Septic Shock)  Goal: Absence of Bleeding  Outcome: Ongoing, Progressing     Problem: Glycemic Control Impaired (Sepsis/Septic Shock)  Goal: Blood Glucose Level Within Desired Range  Outcome: Ongoing, Progressing     Problem: Infection Progression (Sepsis/Septic Shock)  Goal: Absence of Infection Signs and Symptoms  Outcome: Ongoing, Progressing     Problem: Nutrition Impaired (Sepsis/Septic Shock)  Goal: Optimal Nutrition Intake  Outcome: Ongoing, Progressing     Problem: Impaired Wound Healing  Goal: Optimal Wound Healing  Outcome: Ongoing, Progressing     Problem: Device-Related Complication Risk (Hemodialysis)  Goal: Safe,  Effective Therapy Delivery  Outcome: Ongoing, Progressing     Problem: Hemodynamic Instability (Hemodialysis)  Goal: Effective Tissue Perfusion  Outcome: Ongoing, Progressing     Problem: Infection (Hemodialysis)  Goal: Absence of Infection Signs and Symptoms  Outcome: Ongoing, Progressing     Problem: Bariatric Environmental Safety  Goal: Safety Maintained with Care  Outcome: Ongoing, Progressing

## 2024-01-15 NOTE — CARE UPDATE
Care Update:     No acute events overnight. Patient in room 312/312 A. Blood glucose stable on current inpatient regimen. 42 Days Post-Op    Steroid use- None.   Renal function-   Lab Results   Component Value Date    CREATININE 4.8 (H) 01/15/2024        Diet Minced & Moist (IDDSI Level 5) Honey Thick (Moderately Thick)     POCT Glucose   Date Value Ref Range Status   01/15/2024 169 (H) 70 - 110 mg/dL Final   01/14/2024 207 (H) 70 - 110 mg/dL Final   01/14/2024 206 (H) 70 - 110 mg/dL Final   01/14/2024 193 (H) 70 - 110 mg/dL Final   01/14/2024 96 70 - 110 mg/dL Final   01/13/2024 94 70 - 110 mg/dL Final   01/13/2024 176 (H) 70 - 110 mg/dL Final   01/13/2024 212 (H) 70 - 110 mg/dL Final   01/13/2024 133 (H) 70 - 110 mg/dL Final   01/12/2024 148 (H) 70 - 110 mg/dL Final   01/12/2024 127 (H) 70 - 110 mg/dL Final   01/12/2024 169 (H) 70 - 110 mg/dL Final   01/12/2024 165 (H) 70 - 110 mg/dL Final   01/12/2024 165 (H) 70 - 110 mg/dL Final     Lab Results   Component Value Date    HGBA1C 5.1 01/13/2024         Home Medications: Metformin (off since October)       Endocrine  Type 2 diabetes mellitus without complication, without long-term current use of insulin  BG goal 140-180.      - MDC (150/50) prn AC/HS   - POCT Glucose every AC/HS  - Hypoglycemia protocol in place      ** Please notify Endocrine for any change and/or advance in diet**  ** Please call Endocrine for any BG related issues **     Discharge Planning:   TBD. Please notify endocrinology prior to dischar      Erasmo Parrish DNP, FNP-C  Department of Endocrinology  Inpatient Glycemic Management

## 2024-01-15 NOTE — PLAN OF CARE
Recommendations   1. Diet advancement per SLP- continue M+M diet- honey thick liquids    2. Continue Boost for optimization of protein and calorie intake   3. RD following     Goals: Meet % of EEN/EPN by RD f/u date  Nutrition Goal Status: progressing   Communication of RD Recs:  (POC)

## 2024-01-15 NOTE — SUBJECTIVE & OBJECTIVE
Interval History: NAEON. No acute compltains. Last HD session 1/10.     Continuous Infusions:   sodium chloride 0.9%       Scheduled Meds:   acetaminophen  1,000 mg Per NG tube TID    alteplase  2 mg Intra-Catheter Once    amiodarone  400 mg Oral Daily    balsam peru-castor oiL   Topical (Top) BID    cholecalciferol (vitamin D3)  2,000 Units Per NG tube Daily    epoetin zohaib (PROCRIT) injection  50 Units/kg Subcutaneous Every Tues, Thurs, Sat    famotidine  20 mg Oral Daily    fluticasone propionate  2 spray Each Nostril Daily    gabapentin  100 mg Oral BID    mirtazapine  7.5 mg Oral Nightly    polyethylene glycol  17 g Oral Daily    psyllium husk (aspartame)  1 packet Oral Daily    senna-docusate 8.6-50 mg  2 tablet Oral Daily    traZODone  50 mg Oral QHS    venlafaxine  37.5 mg Oral Daily    warfarin  5 mg Oral Daily     PRN Meds:0.9%  NaCl infusion (for blood administration), bisacodyL, dextrose 10%, dextrose 10%, dextrose 10%, dextrose 10%, glucagon (human recombinant), glucose, glucose, heparin (porcine), hydrALAZINE, insulin aspart U-100, ondansetron, Flushing PICC/Midline Protocol **AND** [DISCONTINUED] sodium chloride 0.9% **AND** sodium chloride 0.9%, traMADoL, zolpidem    Review of patient's allergies indicates:  No Known Allergies  Objective:     Vital Signs (Most Recent):  Temp: 97.5 °F (36.4 °C) (01/15/24 0739)  Pulse: 94 (01/15/24 1000)  Resp: 18 (01/15/24 0739)  BP: (!) 80/0 (01/15/24 0745)  SpO2: 96 % (01/15/24 0739) Vital Signs (24h Range):  Temp:  [97.3 °F (36.3 °C)-98.1 °F (36.7 °C)] 97.5 °F (36.4 °C)  Pulse:  [] 94  Resp:  [14-18] 18  SpO2:  [94 %-100 %] 96 %  BP: ()/(0-79) 80/0     Patient Vitals for the past 72 hrs (Last 3 readings):   Weight   01/15/24 0739 72 kg (158 lb 11.7 oz)   01/13/24 0900 68.3 kg (150 lb 9.2 oz)   01/13/24 0400 68.3 kg (150 lb 9.2 oz)       Body mass index is 21.53 kg/m².      Intake/Output Summary (Last 24 hours) at 1/15/2024 1048  Last data filed at  1/15/2024 0950  Gross per 24 hour   Intake 899 ml   Output 700 ml   Net 199 ml         Hemodynamic Parameters:       Telemetry: SR       Physical Exam  Constitutional:       Comments: Cachectic, temporal wasting   HENT:      Head: Normocephalic and atraumatic.   Eyes:      Conjunctiva/sclera: Conjunctivae normal.      Pupils: Pupils are equal, round, and reactive to light.   Neck:      Comments: Do not appreciate elevated JVP  Cardiovascular:      Rate and Rhythm: Normal rate and regular rhythm.      Comments: Smooth VAD hum  Pulmonary:      Effort: Pulmonary effort is normal.      Breath sounds: Normal breath sounds.   Abdominal:      General: Bowel sounds are normal.      Palpations: Abdomen is soft.   Musculoskeletal:         General: No swelling. Normal range of motion.      Cervical back: Normal range of motion and neck supple.   Skin:     General: Skin is warm and dry.      Capillary Refill: Capillary refill takes 2 to 3 seconds.   Neurological:      General: No focal deficit present.      Mental Status: He is alert and oriented to person, place, and time.            Significant Labs:  CBC:  Recent Labs   Lab 01/13/24  0307 01/14/24  0508 01/15/24  0315   WBC 9.54 9.08 6.81   RBC 2.63* 2.38* 2.49*   HGB 7.4* 6.9* 7.0*   HCT 24.1* 21.8* 22.8*    411 401   MCV 92 92 92   MCH 28.1 29.0 28.1   MCHC 30.7* 31.7* 30.7*       BNP:  Recent Labs   Lab 01/10/24  0338 01/12/24  0535 01/15/24  0315   * 830* 748*       CMP:  Recent Labs   Lab 01/13/24  0306 01/14/24  0508 01/15/24  0314   * 94 93   CALCIUM 8.9 9.2 8.8   ALBUMIN 1.9* 1.9* 2.0*   PROT 7.0 7.2 7.2    136 139   K 3.6 4.1 4.1   CO2 23 23 24    103 104   BUN 87* 90* 100*   CREATININE 4.8* 5.1* 4.8*   ALKPHOS 132 131 137*   ALT 20 23 29   AST 23 27 33   BILITOT 0.3 0.3 0.2        Coagulation:   Recent Labs   Lab 01/13/24  0307 01/14/24  0508 01/15/24  0315   INR 1.8* 1.9* 2.1*       LDH:  Recent Labs   Lab 01/13/24  0308  01/14/24  0508 01/15/24  0315   * 300* 297*       Microbiology:  Microbiology Results (last 7 days)       Procedure Component Value Units Date/Time    Fungus culture [[id number]] Collected: 12/06/23 1532    Order Status: Completed Specimen: Body Fluid from Lung, Right Updated: 01/09/24 1154     Fungus (Mycology) Culture No fungus isolated after 4 weeks    Narrative:      Bronchial Wash    Throat culture [[id number]]  (Abnormal)  (Susceptibility) Collected: 01/04/24 1942    Order Status: Completed Specimen: Throat Updated: 01/08/24 1348     RESPIRATORY CULTURE - THROAT PSEUDOMONAS AERUGINOSA   Many  Normal respiratory bobby also present              I have reviewed all pertinent labs within the past 24 hours.    Estimated Creatinine Clearance: 16.5 mL/min (A) (based on SCr of 4.8 mg/dL (H)).    Diagnostic Results:  I have reviewed and interpreted all pertinent imaging results/findings within the past 24 hours.

## 2024-01-15 NOTE — PROGRESS NOTES
01/15/2024  Deni Frye    Current provider:  Sandhya Price MD    Device interrogation:      1/15/2024     7:53 AM 1/15/2024     5:21 AM 1/14/2024    11:00 PM 1/14/2024     7:45 PM 1/14/2024     4:48 PM 1/14/2024    11:47 AM 1/14/2024    11:45 AM   TXP LVAD INTERROGATIONS   Type HeartMate3 HeartMate3 HeartMate3 HeartMate3 HeartMate3 HeartMate3 HeartMate3   Flow 3.8 3.5 3.9 3.6 3.6 4.3    Speed 4900 4900 4900 4900 4900 4900    PI 4.8 6.8 5.5 6.6 7.7 3.6    Power (Carrington) 3.3 3.3 3.3 3.3 3.4 3.4    LSL 4500 4500 4500 4500 4500 4500    Low Flow Alarm no  no no no no    High Power Alarm   no no no no    Pulsatility  Pulse Intermittent pulse Intermittent pulse No Pulse No Pulse           Rounded on [facility] to ensure all mechanical assist device settings (IABP or VAD) were appropriate and all parameters were within limits.  I was able to ensure all back up equipment was present, the staff had no issues, and the Perfusion Department daily rounding was complete.      For implantable VADs: Interrogation of Ventricular assist device was performed with analysis of device parameters and review of device function. I have personally reviewed the interrogation findings and agree with findings as stated.     In emergency, the nursing units have been notified to contact the perfusion department either by:  Calling [phone] from 630am to 4pm Mon thru Fri, utilizing the On-Call Finder functionality of Epic and searching for Perfusion, or by contacting the hospital  from 4pm to 630am and on weekends and asking to speak with the perfusionist on call.    8:19 AM

## 2024-01-15 NOTE — PROGRESS NOTES
01/14/2024  Deni Frye    Current provider:  Sandhya Price MD    Device interrogation:      1/14/2024     7:45 PM 1/14/2024     4:48 PM 1/14/2024    11:47 AM 1/14/2024    11:45 AM 1/14/2024     8:35 AM 1/14/2024     4:00 AM 1/13/2024    11:00 PM   TXP LVAD INTERROGATIONS   Type HeartMate3 HeartMate3 HeartMate3 HeartMate3 HeartMate3 HeartMate3 HeartMate3   Flow 3.6 3.6 4.3  3.6 5.9 4.1   Speed 4900 4900 4900  4900 4900 4900   PI 6.6 7.7 3.6  6 5 4.6   Power (Carrington) 3.3 3.4 3.4  3.4 3.2 3.3   LSL 4500 4500 4500  4500 4500 4500   Low Flow Alarm no no no   no no   High Power Alarm no no no   no no   Pulsatility Intermittent pulse No Pulse No Pulse   Intermittent pulse Intermittent pulse          Rounded on [facility] to ensure all mechanical assist device settings (IABP or VAD) were appropriate and all parameters were within limits.  I was able to ensure all back up equipment was present, the staff had no issues, and the Perfusion Department daily rounding was complete.      For implantable VADs: Interrogation of Ventricular assist device was performed with analysis of device parameters and review of device function. I have personally reviewed the interrogation findings and agree with findings as stated.     In emergency, the nursing units have been notified to contact the perfusion department either by:  Calling [phone] from 630am to 4pm Mon thru Fri, utilizing the On-Call Finder functionality of Epic and searching for Perfusion, or by contacting the hospital  from 4pm to 630am and on weekends and asking to speak with the perfusionist on call.    11:13 PM

## 2024-01-15 NOTE — ASSESSMENT & PLAN NOTE
-HeartMate 3 Implanted  12/1/2023  as DT  -HTS Primary  -Implanted by Dr. Washington  -Continue Coumadin, dosing by PharmD.  Goal INR 2.0-3.0 . Subtherapeutic today.   -Antiplatelets Not on ASA  -LDH is stable overall today. Will continue to monitor daily.  -Weaned off midodrine.  -Speed set at  4900   rpm, LSL 4500 rpm   -Interrogation notable for no events  -ECHO 1/2/24 mild TR, LVEDD 6.25 with HM3 set to 4900 rpm   -Not listed for OHTx, declined for OHTX due to comorbidities   -PT/OT/Speech. Recommending rehab but HD + LVAD may be barrier      Procedure: Device Interrogation Including analysis of device parameters  Current Settings: Ventricular Assist Device  Review of device function is stable/unstable stable        1/15/2024     7:53 AM 1/15/2024     5:21 AM 1/14/2024    11:00 PM 1/14/2024     7:45 PM 1/14/2024     4:48 PM 1/14/2024    11:47 AM 1/14/2024    11:45 AM   TXP LVAD INTERROGATIONS   Type HeartMate3 HeartMate3 HeartMate3 HeartMate3 HeartMate3 HeartMate3 HeartMate3   Flow 3.8 3.5 3.9 3.6 3.6 4.3    Speed 4900 4900 4900 4900 4900 4900    PI 4.8 6.8 5.5 6.6 7.7 3.6    Power (Carrington) 3.3 3.3 3.3 3.3 3.4 3.4    LSL 4500 4500 4500 4500 4500 4500    Low Flow Alarm no  no no no no    High Power Alarm   no no no no    Pulsatility  Pulse Intermittent pulse Intermittent pulse No Pulse No Pulse

## 2024-01-15 NOTE — PROGRESS NOTES
[facility] - Cardiology Stepdown  Adult Nutrition  Progress Note    SUMMARY       Recommendations   1. Diet advancement per SLP- continue M+M diet- honey thick liquids    2. Continue Boost for optimization of protein and calorie intake   3. RD following    Goals: Meet % of EEN/EPN by RD f/u date  Nutrition Goal Status: progressing   Communication of RD Recs:  (POC)    Assessment and Plan    Endocrine  Moderate malnutrition  Malnutrition Type:  Context: acute illness or injury  Level: moderate    Related to (etiology):   Inadequate nutrition intake    Signs and Symptoms (as evidenced by):   Nutritional intake <75% x 7days, observed orbital wasting and trace edema present.     Malnutrition Characteristic Summary:  Energy Intake (Malnutrition): less than 75% for greater than 7 days  Fluid Accumulation (Malnutrition):  (Trace edema)      Interventions/Recommendations (treatment strategy):  Collaboration of nutritional care with other providers.  EN    Nutrition Diagnosis Status:   Continues          Malnutrition Assessment  Malnutrition Context: acute illness or injury  Malnutrition Level: moderate          Energy Intake (Malnutrition): less than 75% for greater than 7 days  Fluid Accumulation (Malnutrition):  (Trace edema)   Orbital Region (Subcutaneous Fat Loss): mild depletion   Zoroastrianism Region (Muscle Loss): moderate depletion          Reason for Assessment    Reason For Assessment: RD follow-up  Diagnosis: cardiac disease (CHF/ s/p LVAD on 12/1)  Relevant Medical History: CAD, DMT2, CHF, PAF  Interdisciplinary Rounds: attended  General Information Comments: Spoke w/ pt at bedside, TF d/c, pt is receiving all nutrition through po intake + nutritional supplements at this time, reports consuming outside meals that pt's wife brings such as mustard greens. Pt reports drinking 1 Boost per day- discussed importance of optimizing nutrition at this time and consuming foods as well as drinking Boost. RD  following  Nutrition Discharge Planning: Vitamin K education provided 1/11. Discussed remaining consistent w/ foods that contain Vitamin K- green leafy vegetables. Handouts wer provided to pt/pt caregiver- no additional questions noted at this time.    Nutrition Risk Screen    Nutrition Risk Screen: difficulty chewing/swallowing    Nutrition/Diet History    Spiritual, Cultural Beliefs, Jew Practices, Values that Affect Care: no  Food Allergies: NKFA    Anthropometrics    Temp: 97.6 °F (36.4 °C)  Height Method: Stated  Height: 6' (182.9 cm)  Height (inches): 72 in  Weight Method: Standard Scale  Weight: 72 kg (158 lb 11.7 oz)  Weight (lb): 158.73 lb  Ideal Body Weight (IBW), Male: 178 lb  % Ideal Body Weight, Male (lb): 101.69 %  BMI (Calculated): 21.5  BMI Grade: 18.5-24.9 - normal       Lab/Procedures/Meds    Pertinent Labs Reviewed: reviewed  Pertinent Labs Comments: Hgb: 6.5, Hct: 19.8, Na: 134, BUN: 74, Creatinine: 4.4, eGFR: 14.5, Glucose: 162, Calcium: 8.5, Phosphorus: 1.9, Albumin: 1.9  Pertinent Medications Reviewed: reviewed  Pertinent Medications Comments: Famotidine, gabapentin, insulin, polyethylene glycol, psyllium husk, warfarin, senna-doc    Estimated/Assessed Needs    Weight Used For Calorie Calculations: 81 kg (178 lb 9.2 oz) (IBW d/t edema present)  Energy Calorie Requirements (kcal): 2025- 2430 kcal  Energy Need Method: Kcal/kg (25-30 kcal/ kg of IBW)  Protein Requirements: 97- 122g (1.2-1.5g/kg of IBW)  Weight Used For Protein Calculations: 81 kg (178 lb 9.2 oz) (IBW d/t edema present.)  Fluid Requirements (mL): 1 ml/kcal or per MD     RDA Method (mL): 2025         Nutrition Prescription Ordered    Current Diet Order: Minced and moist    Evaluation of Received Nutrient/Fluid Intake      I/O: + 2.3 L since 1/1  Energy Calories Required: progressing   Protein Required: progressing   Fluid Required:  (As per MD)  Total Fluid Intake (mL/kg): 1 ml/kcal or per MD  Tolerance: tolerating  % Intake  of Estimated Energy Needs: 75 - 100 %  % Meal Intake: 75 - 100 %    Nutrition Risk    Level of Risk/Frequency of Follow-up:  (Follow-up 1-2x/week)     Monitor and Evaluation    Food and Nutrient Intake: energy intake, food and beverage intake, enteral nutrition intake, parenteral nutrition intake  Food and Nutrient Adminstration: diet order, enteral and parenteral nutrition administration  Knowledge/Beliefs/Attitudes: food and nutrition knowledge/skill, beliefs and attitudes  Physical Activity and Function: nutrition-related ADLs and IADLs, factors affecting access to physical activity  Anthropometric Measurements: height/length, weight, weight change, body mass index, growth pattern indices/percentile ranks  Biochemical Data, Medical Tests and Procedures: electrolyte and renal panel, gastrointestinal profile, glucose/endocrine profile, inflammatory profile, lipid profile  Nutrition-Focused Physical Findings: overall appearance, extremities, muscles and bones, head and eyes, skin     Nutrition Follow-Up    RD Follow-up?: Yes

## 2024-01-15 NOTE — PROGRESS NOTES
01/15/24 1538 01/15/24 1558   Vital Signs   /77 (!) 92/0   MAP (mmHg) 92  --    BP Location Left arm Left forearm   BP Method  --  Doppler   Patient Position Lying Lying     Notified MD Shen of BP. Ordered to recheck in an hour

## 2024-01-15 NOTE — PT/OT/SLP PROGRESS
"Occupational Therapy   Treatment    Name: Radha Abbott  MRN: [id number]  Admitting Diagnosis:  LVAD (left ventricular assist device) present  42 Days Post-Op    Recommendations:     Discharge Recommendations: High Intensity Therapy  Discharge Equipment Recommendations:  hospital bed, wheelchair  Barriers to discharge:  None    Assessment:     Radha Abbott is a 63 y.o. male with a medical diagnosis of LVAD (left ventricular assist device) present.  He presents with LVAD. Performance deficits affecting function are weakness, impaired endurance, impaired self care skills, impaired functional mobility, decreased upper extremity function. Pt was dressed in own clothes upon arrival and on battery upon arrival.  Able to perform LB dressing c set-up.  Performed sit/stand T/F c mod A and was able to walk in room 22 feet x2 c rest break.  Then pt was taken out into hallway and was able to walk 200 feet c seated rest break.  Pt states that his pain in his back is improving.    Rehab Prognosis:  Good; patient would benefit from acute skilled OT services to address these deficits and reach maximum level of function.       Plan:     Patient to be seen 5 x/week to address the above listed problems via self-care/home management, therapeutic activities, therapeutic exercises  Plan of Care Expires: 02/03/24  Plan of Care Reviewed with: patient, spouse    Subjective     Chief Complaint: LVAD  Patient/Family Comments/goals: "My wife helped me get dressed".  Pain/Comfort:  Pain Rating 1: 5/10    Objective:     Communicated with: RN prior to session.  Patient found up in chair with LVAD, telemetry upon OT entry to room.    General Precautions: Standard, fall, LVAD, sternal    Orthopedic Precautions:N/A  Braces: N/A  Respiratory Status: Room air     Occupational Performance:     Functional Mobility/Transfers:  Patient completed Sit <> Stand Transfer with moderate assistance  with  platform walker   Functional Mobility: Pt was able to walk in " room and out into hallway c CGA and platform walker.    Activities of Daily Living:  Lower Body Dressing: stand by assistance to don/doff shoes while sitting UIC.      [facility] 6 Click ADL: 16    Treatment & Education:  Pt was able to state LVAD management to OT including how to switch batteries and contents of emergency bag.    Patient left up in chair with all lines intact, call button in reach, and RN notified    GOALS:   Multidisciplinary Problems       Occupational Therapy Goals          Problem: Occupational Therapy    Goal Priority Disciplines Outcome Interventions   Occupational Therapy Goal     OT, PT/OT Ongoing, Progressing    Description: Goals to be met by: 2/3/24    Patient will increase functional independence with ADLs by performing:    UB Dressing with SBA  LE Dressing with Supervision.  Grooming while standing at sink with Supervision.  Toileting from bedside commode with SBA for hygiene and clothing management.   Step transfer to bedside commode with SBA  Bendena with VAD management during ADLs                             Time Tracking:     OT Date of Treatment: 01/15/24  OT Start Time: 0930  OT Stop Time: 1010  OT Total Time (min): 40 min    Billable Minutes:Self Care/Home Management 30  Therapeutic Activity 10          Number of PRIETO visits since last OT visit: 0    1/15/2024

## 2024-01-15 NOTE — PROGRESS NOTES
[facility] - Cardiology Stepdown  Heart Transplant  Progress Note    Patient Name: Radha Abbott  MRN: [id number]  Admission Date: 11/9/2023  Hospital Length of Stay: 67 days  Attending Physician: Sandhya Price MD  Primary Care Provider: Vasu Kong MD  Principal Problem:LVAD (left ventricular assist device) present    Subjective:   Interval History: NAEON. No acute compltains. Last HD session 1/10.     Continuous Infusions:   sodium chloride 0.9%       Scheduled Meds:   acetaminophen  1,000 mg Per NG tube TID    alteplase  2 mg Intra-Catheter Once    amiodarone  400 mg Oral Daily    balsam peru-castor oiL   Topical (Top) BID    cholecalciferol (vitamin D3)  2,000 Units Per NG tube Daily    epoetin zohaib (PROCRIT) injection  50 Units/kg Subcutaneous Every Tues, Thurs, Sat    famotidine  20 mg Oral Daily    fluticasone propionate  2 spray Each Nostril Daily    gabapentin  100 mg Oral BID    mirtazapine  7.5 mg Oral Nightly    polyethylene glycol  17 g Oral Daily    psyllium husk (aspartame)  1 packet Oral Daily    senna-docusate 8.6-50 mg  2 tablet Oral Daily    traZODone  50 mg Oral QHS    venlafaxine  37.5 mg Oral Daily    warfarin  5 mg Oral Daily     PRN Meds:0.9%  NaCl infusion (for blood administration), bisacodyL, dextrose 10%, dextrose 10%, dextrose 10%, dextrose 10%, glucagon (human recombinant), glucose, glucose, heparin (porcine), hydrALAZINE, insulin aspart U-100, ondansetron, Flushing PICC/Midline Protocol **AND** [DISCONTINUED] sodium chloride 0.9% **AND** sodium chloride 0.9%, traMADoL, zolpidem    Review of patient's allergies indicates:  No Known Allergies  Objective:     Vital Signs (Most Recent):  Temp: 97.5 °F (36.4 °C) (01/15/24 0739)  Pulse: 94 (01/15/24 1000)  Resp: 18 (01/15/24 0739)  BP: (!) 80/0 (01/15/24 0745)  SpO2: 96 % (01/15/24 0739) Vital Signs (24h Range):  Temp:  [97.3 °F (36.3 °C)-98.1 °F (36.7 °C)] 97.5 °F (36.4 °C)  Pulse:  [] 94  Resp:  [14-18] 18  SpO2:  [94 %-100 %]  96 %  BP: ()/(0-79) 80/0     Patient Vitals for the past 72 hrs (Last 3 readings):   Weight   01/15/24 0739 72 kg (158 lb 11.7 oz)   01/13/24 0900 68.3 kg (150 lb 9.2 oz)   01/13/24 0400 68.3 kg (150 lb 9.2 oz)       Body mass index is 21.53 kg/m².      Intake/Output Summary (Last 24 hours) at 1/15/2024 1048  Last data filed at 1/15/2024 0950  Gross per 24 hour   Intake 899 ml   Output 700 ml   Net 199 ml         Hemodynamic Parameters:       Telemetry: SR       Physical Exam  Constitutional:       Comments: Cachectic, temporal wasting   HENT:      Head: Normocephalic and atraumatic.   Eyes:      Conjunctiva/sclera: Conjunctivae normal.      Pupils: Pupils are equal, round, and reactive to light.   Neck:      Comments: Do not appreciate elevated JVP  Cardiovascular:      Rate and Rhythm: Normal rate and regular rhythm.      Comments: Smooth VAD hum  Pulmonary:      Effort: Pulmonary effort is normal.      Breath sounds: Normal breath sounds.   Abdominal:      General: Bowel sounds are normal.      Palpations: Abdomen is soft.   Musculoskeletal:         General: No swelling. Normal range of motion.      Cervical back: Normal range of motion and neck supple.   Skin:     General: Skin is warm and dry.      Capillary Refill: Capillary refill takes 2 to 3 seconds.   Neurological:      General: No focal deficit present.      Mental Status: He is alert and oriented to person, place, and time.            Significant Labs:  CBC:  Recent Labs   Lab 01/13/24  0307 01/14/24  0508 01/15/24  0315   WBC 9.54 9.08 6.81   RBC 2.63* 2.38* 2.49*   HGB 7.4* 6.9* 7.0*   HCT 24.1* 21.8* 22.8*    411 401   MCV 92 92 92   MCH 28.1 29.0 28.1   MCHC 30.7* 31.7* 30.7*       BNP:  Recent Labs   Lab 01/10/24  0338 01/12/24  0535 01/15/24  0315   * 830* 748*       CMP:  Recent Labs   Lab 01/13/24  0306 01/14/24  0508 01/15/24  0314   * 94 93   CALCIUM 8.9 9.2 8.8   ALBUMIN 1.9* 1.9* 2.0*   PROT 7.0 7.2 7.2    136  139   K 3.6 4.1 4.1   CO2 23 23 24    103 104   BUN 87* 90* 100*   CREATININE 4.8* 5.1* 4.8*   ALKPHOS 132 131 137*   ALT 20 23 29   AST 23 27 33   BILITOT 0.3 0.3 0.2        Coagulation:   Recent Labs   Lab 01/13/24  0307 01/14/24  0508 01/15/24  0315   INR 1.8* 1.9* 2.1*       LDH:  Recent Labs   Lab 01/13/24  0307 01/14/24  0508 01/15/24  0315   * 300* 297*       Microbiology:  Microbiology Results (last 7 days)       Procedure Component Value Units Date/Time    Fungus culture [[id number]] Collected: 12/06/23 1532    Order Status: Completed Specimen: Body Fluid from Lung, Right Updated: 01/09/24 1154     Fungus (Mycology) Culture No fungus isolated after 4 weeks    Narrative:      Bronchial Wash    Throat culture [[id number]]  (Abnormal)  (Susceptibility) Collected: 01/04/24 1942    Order Status: Completed Specimen: Throat Updated: 01/08/24 1348     RESPIRATORY CULTURE - THROAT PSEUDOMONAS AERUGINOSA   Many  Normal respiratory bobby also present              I have reviewed all pertinent labs within the past 24 hours.    Estimated Creatinine Clearance: 16.5 mL/min (A) (based on SCr of 4.8 mg/dL (H)).    Diagnostic Results:  I have reviewed and interpreted all pertinent imaging results/findings within the past 24 hours.  Assessment and Plan:     63 year old male with hx of CAD s/p 3v CABG (unclear anatomy, 2009), ICM with a recent EF of 15-20% s/p ICD (medtronik 2009), DM2 (a1c 7.7), HTN, HLD, Vfib on amio who presents to the ED with CC of SOB     Pt was recently [facility] [place].  He was started on a Lasix gtt and did well.  Started on gDMT and discharged home on  5 with plans to follow up in HTS clinic for ongoing transplant evaluation at another facility.  He says that about a few days ago he started to notice LE swelling.  This turned into Nelson and orthopnea.  He says he can't walk to the bathroom without getting SOB.  He also complains of weight gain.  He takes torsemide 20mg  BID at home and was told to trial additional Lasix which he did without any improvement.  He was rx metolazone but has not been filled.  He came to the ED     In the ED he was AF with stable VS on RA.  CBC showing chronic anemia.  CMP notable for acute on chronic CKD with baseline around 2.1 and Cr now 2.6.  BNP elevated.  Lactate neg.  CXR showing pulmonary edema.  I evaluated the pt at the bedside.  Bedside TTE showing CVP >15.  He was subsequently admitted to the CCU for diuresis.     He was continued on his home  and was started on a lasix gtt, which he responded well to overnight (net -1700cc. He feels much better this morning as well. Our transplant coordinators have been working on insurance approval and he is now being transferred to [facility] service for transplant evaluation.     * LVAD (left ventricular assist device) present  -HeartMate 3 Implanted  12/1/2023  as DT  -HTS Primary  -Implanted by Dr. Washington  -Continue Coumadin, dosing by PharmD.  Goal INR 2.0-3.0 . Subtherapeutic today.   -Antiplatelets Not on ASA  -LDH is stable overall today. Will continue to monitor daily.  -Weaned off midodrine.  -Speed set at  4900   rpm, LSL 4500 rpm   -Interrogation notable for no events  -ECHO 1/2/24 mild TR, LVEDD 6.25 with HM3 set to 4900 rpm   -Not listed for OHTx, declined for OHTX due to comorbidities   -PT/OT/Speech. Recommending rehab but HD + LVAD may be barrier      Procedure: Device Interrogation Including analysis of device parameters  Current Settings: Ventricular Assist Device  Review of device function is stable/unstable stable        1/15/2024     7:53 AM 1/15/2024     5:21 AM 1/14/2024    11:00 PM 1/14/2024     7:45 PM 1/14/2024     4:48 PM 1/14/2024    11:47 AM 1/14/2024    11:45 AM   TXP LVAD INTERROGATIONS   Type HeartMate3 HeartMate3 HeartMate3 HeartMate3 HeartMate3 HeartMate3 HeartMate3   Flow 3.8 3.5 3.9 3.6 3.6 4.3    Speed 4900 4900 4900 4900 4900 4900    PI 4.8 6.8 5.5 6.6 7.7 3.6    Power  (Carrington) 3.3 3.3 3.3 3.3 3.4 3.4    LSL 4500 4500 4500 4500 4500 4500    Low Flow Alarm no  no no no no    High Power Alarm   no no no no    Pulsatility  Pulse Intermittent pulse Intermittent pulse No Pulse No Pulse          Right parotid mass  - 2.2cm R. Parotid mass noted.   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 Can follow up as outpatient w/ ENT for FNA. No further interventions needed as inpatient.     Lymphadenopathy  +ill contacts  Respiratory panel negative  Strep test negative.   Throat culture showing pseudomonas. As per ID , will treat w/ 5 day course of zosyn (therapy completed 1/10)    Unilateral complete vocal fold paralysis  -Appreciate ENT's help  -S/P augmentation of paralyzed left vocal cord 12/18    Dysphonia  -Speech following closely  -Appreciate ENT's help. S/P augmentation of paralyzed left vocal cord 12/18    Moderate malnutrition  -RD and SLP following  -Tolerating tube feeds. Total daily caloric intake increased to 2160  -Failed MBBS 12/20, continue TF.  -Speech following, appreciate their continued recommendations     Depressed mood  -Psychiatry consulted for depressed mood, SI when left alone  -Recommend sitter when alone (possibly with transition to stepdown).  -Psych reconsulted 1/2, agree with venlafaxine and trazadone    IVÁN (acute kidney injury)  -Nephrology following      Acute on chronic combined systolic and diastolic CHF, NYHA class 4  Pt with known ICM with an EF of 25% on home  presented with decompensated HF.    -S/P LVAD    PAF (paroxysmal atrial fibrillation)  Known hx of pAF. In sinus rhythm on admission, but 1 run of AF RVR overnight. He spontaneously converted.  - Continue home amiodarone 400mg qd  - Continue AC, on hold with supratherapeutic INR       Acute renal failure with acute tubular necrosis superimposed on stage 3b chronic kidney disease  IVÁN on CKD2. Baseline Cr of 1.7-2.0.   - Hold ARNi  -Trend BMPs daily   -Nephrology following   -SLED/SCUF  for volume removal began on 12/1. He is anuric  -Did not respond to diuretic challenge with 240 mg IV Lasix, 1000 mg Diuril, and 500 mg IV Diamox done 12/14  -Diuretic challenge 1/8 with 160 mg IVP Lasix,   -total  cc/24 hours   -Now on HD TThSat; but last HD was on 1/10  -Tunneled trialysis line placed 12/22 by IR    Type 2 diabetes mellitus without complication, without long-term current use of insulin  -A1c 7.6, not insulin dependent  - Endocrine following, appreciate assistance with blood glucose management    CAD (coronary artery disease)  -S/p 3vCABG in 2009  -Lipitor on hold 2/2 mild transaminitis  -Not on ASA post VAD    Ventricular tachycardia  Hx VT s/p ICD placement (medtronic 2009)  - Continue home amio 400mg qd        Angela Shen MD  Heart Transplant  [facility] - Cardiology Stepdown

## 2024-01-15 NOTE — PLAN OF CARE
Plan of care discussed with patient. Patient AOX4 and VS stable. Patient up with max assist, fall precautions in place. LVAD DP and numbers WNL, smooth LVAD hum. Patient has no complaints of pain. Discussed medications and care. Patient has no questions at this time.

## 2024-01-16 LAB
ALBUMIN SERPL BCP-MCNC: 2 G/DL (ref 3.5–5.2)
ALP SERPL-CCNC: 138 U/L (ref 55–135)
ALT SERPL W/O P-5'-P-CCNC: 37 U/L (ref 10–44)
ANION GAP SERPL CALC-SCNC: 10 MMOL/L (ref 8–16)
AST SERPL-CCNC: 43 U/L (ref 10–40)
BASOPHILS # BLD AUTO: 0.04 K/UL (ref 0–0.2)
BASOPHILS NFR BLD: 0.7 % (ref 0–1.9)
BILIRUB DIRECT SERPL-MCNC: 0.1 MG/DL (ref 0.1–0.3)
BILIRUB SERPL-MCNC: 0.2 MG/DL (ref 0.1–1)
BUN SERPL-MCNC: 90 MG/DL (ref 8–23)
CALCIUM SERPL-MCNC: 8.6 MG/DL (ref 8.7–10.5)
CHLORIDE SERPL-SCNC: 105 MMOL/L (ref 95–110)
CO2 SERPL-SCNC: 22 MMOL/L (ref 23–29)
CREAT SERPL-MCNC: 4.6 MG/DL (ref 0.5–1.4)
DIFFERENTIAL METHOD BLD: ABNORMAL
EOSINOPHIL # BLD AUTO: 0.2 K/UL (ref 0–0.5)
EOSINOPHIL NFR BLD: 2.6 % (ref 0–8)
ERYTHROCYTE [DISTWIDTH] IN BLOOD BY AUTOMATED COUNT: 16.9 % (ref 11.5–14.5)
EST. GFR  (NO RACE VARIABLE): 13.7 ML/MIN/1.73 M^2
GLUCOSE SERPL-MCNC: 89 MG/DL (ref 70–110)
HCT VFR BLD AUTO: 22.6 % (ref 40–54)
HGB BLD-MCNC: 6.9 G/DL (ref 14–18)
IMM GRANULOCYTES # BLD AUTO: 0.04 K/UL (ref 0–0.04)
IMM GRANULOCYTES NFR BLD AUTO: 0.7 % (ref 0–0.5)
INR PPP: 2.7 (ref 0.8–1.2)
LDH SERPL L TO P-CCNC: 308 U/L (ref 110–260)
LYMPHOCYTES # BLD AUTO: 0.9 K/UL (ref 1–4.8)
LYMPHOCYTES NFR BLD: 14.6 % (ref 18–48)
MAGNESIUM SERPL-MCNC: 2.1 MG/DL (ref 1.6–2.6)
MCH RBC QN AUTO: 28.6 PG (ref 27–31)
MCHC RBC AUTO-ENTMCNC: 30.5 G/DL (ref 32–36)
MCV RBC AUTO: 94 FL (ref 82–98)
MONOCYTES # BLD AUTO: 0.5 K/UL (ref 0.3–1)
MONOCYTES NFR BLD: 8.6 % (ref 4–15)
NEUTROPHILS # BLD AUTO: 4.5 K/UL (ref 1.8–7.7)
NEUTROPHILS NFR BLD: 72.8 % (ref 38–73)
NRBC BLD-RTO: 0 /100 WBC
PHOSPHATE SERPL-MCNC: 4.9 MG/DL (ref 2.7–4.5)
PLATELET # BLD AUTO: 374 K/UL (ref 150–450)
PMV BLD AUTO: 8.6 FL (ref 9.2–12.9)
POCT GLUCOSE: 101 MG/DL (ref 70–110)
POCT GLUCOSE: 108 MG/DL (ref 70–110)
POCT GLUCOSE: 124 MG/DL (ref 70–110)
POCT GLUCOSE: 151 MG/DL (ref 70–110)
POTASSIUM SERPL-SCNC: 4.9 MMOL/L (ref 3.5–5.1)
PROT SERPL-MCNC: 7 G/DL (ref 6–8.4)
PROTHROMBIN TIME: 27.3 SEC (ref 9–12.5)
RBC # BLD AUTO: 2.41 M/UL (ref 4.6–6.2)
SODIUM SERPL-SCNC: 137 MMOL/L (ref 136–145)
WBC # BLD AUTO: 6.15 K/UL (ref 3.9–12.7)

## 2024-01-16 PROCEDURE — 97116 GAIT TRAINING THERAPY: CPT

## 2024-01-16 PROCEDURE — 25000003 PHARM REV CODE 250: Performed by: STUDENT IN AN ORGANIZED HEALTH CARE EDUCATION/TRAINING PROGRAM

## 2024-01-16 PROCEDURE — 85025 COMPLETE CBC W/AUTO DIFF WBC: CPT | Performed by: PHYSICIAN ASSISTANT

## 2024-01-16 PROCEDURE — 97535 SELF CARE MNGMENT TRAINING: CPT

## 2024-01-16 PROCEDURE — 25000003 PHARM REV CODE 250: Performed by: PHYSICIAN ASSISTANT

## 2024-01-16 PROCEDURE — 83735 ASSAY OF MAGNESIUM: CPT | Performed by: INTERNAL MEDICINE

## 2024-01-16 PROCEDURE — 27000207 HC ISOLATION

## 2024-01-16 PROCEDURE — 93750 INTERROGATION VAD IN PERSON: CPT | Mod: ,,, | Performed by: INTERNAL MEDICINE

## 2024-01-16 PROCEDURE — 97110 THERAPEUTIC EXERCISES: CPT

## 2024-01-16 PROCEDURE — 25000003 PHARM REV CODE 250: Performed by: INTERNAL MEDICINE

## 2024-01-16 PROCEDURE — 85610 PROTHROMBIN TIME: CPT | Performed by: PHYSICIAN ASSISTANT

## 2024-01-16 PROCEDURE — 20600001 HC STEP DOWN PRIVATE ROOM

## 2024-01-16 PROCEDURE — 27000248 HC VAD-ADDITIONAL DAY

## 2024-01-16 PROCEDURE — 36415 COLL VENOUS BLD VENIPUNCTURE: CPT | Performed by: PHYSICIAN ASSISTANT

## 2024-01-16 PROCEDURE — 80076 HEPATIC FUNCTION PANEL: CPT | Performed by: INTERNAL MEDICINE

## 2024-01-16 PROCEDURE — 83615 LACTATE (LD) (LDH) ENZYME: CPT | Performed by: STUDENT IN AN ORGANIZED HEALTH CARE EDUCATION/TRAINING PROGRAM

## 2024-01-16 PROCEDURE — 84100 ASSAY OF PHOSPHORUS: CPT | Performed by: INTERNAL MEDICINE

## 2024-01-16 PROCEDURE — 80048 BASIC METABOLIC PNL TOTAL CA: CPT | Performed by: INTERNAL MEDICINE

## 2024-01-16 PROCEDURE — 99232 SBSQ HOSP IP/OBS MODERATE 35: CPT | Mod: ,,, | Performed by: NURSE PRACTITIONER

## 2024-01-16 PROCEDURE — 99233 SBSQ HOSP IP/OBS HIGH 50: CPT | Mod: ,,, | Performed by: PHYSICIAN ASSISTANT

## 2024-01-16 RX ORDER — WARFARIN 2.5 MG/1
2.5 TABLET ORAL DAILY
Status: DISCONTINUED | OUTPATIENT
Start: 2024-01-16 | End: 2024-01-17

## 2024-01-16 RX ADMIN — TRAZODONE HYDROCHLORIDE 50 MG: 50 TABLET ORAL at 08:01

## 2024-01-16 RX ADMIN — WARFARIN SODIUM 2.5 MG: 2.5 TABLET ORAL at 04:01

## 2024-01-16 RX ADMIN — FLUTICASONE PROPIONATE 100 MCG: 50 SPRAY, METERED NASAL at 08:01

## 2024-01-16 RX ADMIN — FAMOTIDINE 20 MG: 20 TABLET ORAL at 08:01

## 2024-01-16 RX ADMIN — ACETAMINOPHEN 1000 MG: 500 TABLET ORAL at 08:01

## 2024-01-16 RX ADMIN — AMIODARONE HYDROCHLORIDE 400 MG: 200 TABLET ORAL at 08:01

## 2024-01-16 RX ADMIN — TRAMADOL HYDROCHLORIDE 50 MG: 50 TABLET, COATED ORAL at 12:01

## 2024-01-16 RX ADMIN — MIRTAZAPINE 7.5 MG: 7.5 TABLET, FILM COATED ORAL at 08:01

## 2024-01-16 RX ADMIN — ACETAMINOPHEN 1000 MG: 500 TABLET ORAL at 02:01

## 2024-01-16 RX ADMIN — Medication: at 08:01

## 2024-01-16 RX ADMIN — SENNOSIDES AND DOCUSATE SODIUM 2 TABLET: 8.6; 5 TABLET ORAL at 08:01

## 2024-01-16 RX ADMIN — Medication 2000 UNITS: at 08:01

## 2024-01-16 RX ADMIN — POLYETHYLENE GLYCOL 3350 17 G: 17 POWDER, FOR SOLUTION ORAL at 08:01

## 2024-01-16 RX ADMIN — GABAPENTIN 100 MG: 100 CAPSULE ORAL at 08:01

## 2024-01-16 RX ADMIN — VENLAFAXINE 37.5 MG: 37.5 TABLET ORAL at 08:01

## 2024-01-16 NOTE — PROGRESS NOTES
Patient was seen today in the hospital. Patient awake at time of visit. Spouse at bedside. Patient and spouse educated on VAD equipment. MPU, UBC, batteries, emergency bag, and equipment wearables were all covered. Patient's vest and battery holster were setup for patient today. Patient and spouse were educated on how to properly use all equipment wearables. Patient is progressing well with his overall understanding of the VAD equipment. Will come by later this week for more education.

## 2024-01-16 NOTE — PROGRESS NOTES
Rounded on patient at bedside. Patient is A&Ox 4.  LVAD history interrogated with no abnormal events noted.  LVAD numbers WNL compared to baseline. Pt denies any needs at this time. Patient was up in the wheelchair with therapy at the bedside working with patient. Encouraged pt to notify nurse if they have any questions, problems or concerns for LVAD coordinator.  Pt verbalized understanding and in agreement of plan. Plan for team to continue to round on patient.

## 2024-01-16 NOTE — PROGRESS NOTES
[facility] - Cardiology Stepdown  Skin Integrity ENRRIQUE  Progress Note    Patient Name: Radha Abbott  MRN: [id number]  Admission Date: 11/9/2023  Hospital Length of Stay: 68 days  Attending Physician: Sandhya Price MD  Primary Care Provider: Vasu Kong MD         Subjective:     HPI:  Radha Abbott is a 63 year old male with hx of CAD s/p 3v CABG (unclear anatomy, 2009), ICM with a recent EF of 15-20% s/p ICD (medtronik 2009), DM2 (a1c 7.7), HTN, HLD, Vfib on amio who presents to the ED with CC of SOB.     Pt was recently admitted to [facility] as a transfer.  He was started on a Lasix gtt and did well.  Started on gDMT and discharged home on  5 with plans to follow up in HTS clinic for ongoing transplant evaluation at another facility.  He says that about a few days ago he started to notice LE swelling.  This turned into Nelson and orthopnea.  He says he can't walk to the bathroom without getting SOB.  He also complains of weight gain.  He takes torsemide 20mg BID at home and was told to trial additional Lasix which he did without any improvement.  He was rx metolazone but has not been filled.  He came to the ED     In the ED he was AF with stable VS on RA.  CBC showing chronic anemia.  CMP notable for acute on chronic CKD with baseline around 2.1 and Cr now 2.6.  BNP elevated.  Lactate neg.  CXR showing pulmonary edema.  I evaluated the pt at the bedside.  Bedside TTE showing CVP >15.  He was subsequently admitted to the CCU for diuresis. Skin integrity ENRRIQUE consulted for evaluation of skin injury.    Hospital Course:   No notes on file      Follow-up For: Procedure(s) (LRB):  CLOSURE, WOUND, STERNUM (N/A)  INSERTION, GRAFT, PERICARDIUM  DRAINAGE, PLEURAL EFFUSION    Post-Operative Day: 43 Days Post-Op    Scheduled Meds:   acetaminophen  1,000 mg Per NG tube TID    alteplase  2 mg Intra-Catheter Once    amiodarone  400 mg Oral Daily    balsam peru-castor oiL   Topical (Top) BID    cholecalciferol (vitamin D3)  2,000  Units Per NG tube Daily    epoetin zohaib (PROCRIT) injection  50 Units/kg Subcutaneous Every Tues, Thurs, Sat    famotidine  20 mg Oral Daily    fluticasone propionate  2 spray Each Nostril Daily    gabapentin  100 mg Oral BID    mirtazapine  7.5 mg Oral Nightly    polyethylene glycol  17 g Oral Daily    psyllium husk (aspartame)  1 packet Oral Daily    senna-docusate 8.6-50 mg  2 tablet Oral Daily    traZODone  50 mg Oral QHS    venlafaxine  37.5 mg Oral Daily    warfarin  5 mg Oral Daily     Continuous Infusions:   sodium chloride 0.9%       PRN Meds:0.9%  NaCl infusion (for blood administration), bisacodyL, dextrose 10%, dextrose 10%, dextrose 10%, dextrose 10%, glucagon (human recombinant), glucose, glucose, heparin (porcine), hydrALAZINE, insulin aspart U-100, ondansetron, Flushing PICC/Midline Protocol **AND** [DISCONTINUED] sodium chloride 0.9% **AND** sodium chloride 0.9%, traMADoL, zolpidem    Review of Systems   Skin:  Positive for wound.     Objective:     Vital Signs (Most Recent):  Temp: 97.8 °F (36.6 °C) (01/16/24 0730)  Pulse: 94 (01/16/24 0730)  Resp: 18 (01/16/24 0730)  BP: (!) 82/0 (01/16/24 0730)  SpO2: 95 % (01/16/24 0730) Vital Signs (24h Range):  Temp:  [97.6 °F (36.4 °C)-98 °F (36.7 °C)] 97.8 °F (36.6 °C)  Pulse:  [] 94  Resp:  [16-18] 18  SpO2:  [95 %-96 %] 95 %  BP: ()/(0-82) 82/0     Weight: 70.9 kg (156 lb 4.9 oz)  Body mass index is 21.2 kg/m².     Physical Exam  Constitutional:       Appearance: Normal appearance.   Skin:     General: Skin is warm and dry.      Findings: Lesion present.   Neurological:      Mental Status: He is alert.          Laboratory:  All pertinent labs reviewed within the last 24 hours.    Diagnostic Results:  None    Assessment/Plan:         ENRRIQUE Skin Integrity Evaluation      Skin Integrity ENRRIQUE evaluation of patient as part of the comprehensive skin care team.     He has been admitted for 63 days. Skin injury was noted on 12/15/23. POA  no.            Orthopedic  Deep tissue injury  - follow up evaluation of skin injury.  - pt presented for sob. S/p LVAD placement 12/1.  - deep tissue injury to sacrum that has revealed a partial thickness tissue loss with pink and yellow wound base and darkened periwound. Friction and shearing complicating wound as well.  - continue Triad bid/prn.   - Immerse surface.  - turn q2h.  - wedge for offloading.  - nursing to maintain pressure injury prevention measures and continue wound care per orders.        Dayanna Crabtree NP  Skin Integrity ENRRIQUE  [facility] - Cardiology Stepdown

## 2024-01-16 NOTE — SUBJECTIVE & OBJECTIVE
Interval History: NAEON. No acute compltains. Last HD session 1/10. Kidney function stable and appears euvolemic.    Continuous Infusions:   sodium chloride 0.9%       Scheduled Meds:   acetaminophen  1,000 mg Per NG tube TID    alteplase  2 mg Intra-Catheter Once    amiodarone  400 mg Oral Daily    balsam peru-castor oiL   Topical (Top) BID    cholecalciferol (vitamin D3)  2,000 Units Per NG tube Daily    epoetin zohaib (PROCRIT) injection  50 Units/kg Subcutaneous Every Tues, Thurs, Sat    famotidine  20 mg Oral Daily    fluticasone propionate  2 spray Each Nostril Daily    gabapentin  100 mg Oral BID    mirtazapine  7.5 mg Oral Nightly    polyethylene glycol  17 g Oral Daily    psyllium husk (aspartame)  1 packet Oral Daily    senna-docusate 8.6-50 mg  2 tablet Oral Daily    traZODone  50 mg Oral QHS    venlafaxine  37.5 mg Oral Daily    warfarin  2.5 mg Oral Daily     PRN Meds:0.9%  NaCl infusion (for blood administration), bisacodyL, dextrose 10%, dextrose 10%, dextrose 10%, dextrose 10%, glucagon (human recombinant), glucose, glucose, heparin (porcine), hydrALAZINE, insulin aspart U-100, ondansetron, Flushing PICC/Midline Protocol **AND** [DISCONTINUED] sodium chloride 0.9% **AND** sodium chloride 0.9%, traMADoL, zolpidem    Review of patient's allergies indicates:  No Known Allergies  Objective:     Vital Signs (Most Recent):  Temp: 97.8 °F (36.6 °C) (01/16/24 0730)  Pulse: 94 (01/16/24 1005)  Resp: 18 (01/16/24 0730)  BP: (!) 82/0 (01/16/24 0730)  SpO2: 95 % (01/16/24 0730) Vital Signs (24h Range):  Temp:  [97.6 °F (36.4 °C)-98 °F (36.7 °C)] 97.8 °F (36.6 °C)  Pulse:  [] 94  Resp:  [16-18] 18  SpO2:  [95 %-96 %] 95 %  BP: ()/(0-82) 82/0     Patient Vitals for the past 72 hrs (Last 3 readings):   Weight   01/16/24 0838 70.9 kg (156 lb 4.9 oz)   01/15/24 0739 72 kg (158 lb 11.7 oz)       Body mass index is 21.2 kg/m².      Intake/Output Summary (Last 24 hours) at 1/16/2024 1043  Last data filed at  1/16/2024 0745  Gross per 24 hour   Intake 702 ml   Output 200 ml   Net 502 ml         Hemodynamic Parameters:       Telemetry: SR       Physical Exam  Constitutional:       Comments: Cachectic, temporal wasting   HENT:      Head: Normocephalic and atraumatic.   Eyes:      Conjunctiva/sclera: Conjunctivae normal.      Pupils: Pupils are equal, round, and reactive to light.   Neck:      Comments: Do not appreciate elevated JVP  Cardiovascular:      Rate and Rhythm: Normal rate and regular rhythm.      Comments: Smooth VAD hum  Pulmonary:      Effort: Pulmonary effort is normal.      Breath sounds: Normal breath sounds.   Abdominal:      General: Bowel sounds are normal.      Palpations: Abdomen is soft.   Musculoskeletal:         General: No swelling. Normal range of motion.      Cervical back: Normal range of motion and neck supple.   Skin:     General: Skin is warm and dry.      Capillary Refill: Capillary refill takes 2 to 3 seconds.   Neurological:      General: No focal deficit present.      Mental Status: He is alert and oriented to person, place, and time.            Significant Labs:  CBC:  Recent Labs   Lab 01/14/24  0508 01/15/24  0315 01/16/24  0244   WBC 9.08 6.81 6.15   RBC 2.38* 2.49* 2.41*   HGB 6.9* 7.0* 6.9*   HCT 21.8* 22.8* 22.6*    401 374   MCV 92 92 94   MCH 29.0 28.1 28.6   MCHC 31.7* 30.7* 30.5*       BNP:  Recent Labs   Lab 01/10/24  0338 01/12/24  0535 01/15/24  0315   * 830* 748*       CMP:  Recent Labs   Lab 01/14/24  0508 01/15/24  0314 01/16/24  0244   GLU 94 93 89   CALCIUM 9.2 8.8 8.6*   ALBUMIN 1.9* 2.0* 2.0*   PROT 7.2 7.2 7.0    139 137   K 4.1 4.1 4.9   CO2 23 24 22*    104 105   BUN 90* 100* 90*   CREATININE 5.1* 4.8* 4.6*   ALKPHOS 131 137* 138*   ALT 23 29 37   AST 27 33 43*   BILITOT 0.3 0.2 0.2        Coagulation:   Recent Labs   Lab 01/14/24  0508 01/15/24  0315 01/16/24  0244   INR 1.9* 2.1* 2.7*       LDH:  Recent Labs   Lab 01/14/24  050  01/15/24  0315 01/16/24  0244   * 297* 308*       Microbiology:  Microbiology Results (last 7 days)       Procedure Component Value Units Date/Time    Fungus culture [[id number]] Collected: 12/06/23 1532    Order Status: Completed Specimen: Body Fluid from Lung, Right Updated: 01/09/24 1154     Fungus (Mycology) Culture No fungus isolated after 4 weeks    Narrative:      Bronchial Wash            I have reviewed all pertinent labs within the past 24 hours.    Estimated Creatinine Clearance: 16.9 mL/min (A) (based on SCr of 4.6 mg/dL (H)).    Diagnostic Results:  I have reviewed and interpreted all pertinent imaging results/findings within the past 24 hours.

## 2024-01-16 NOTE — ASSESSMENT & PLAN NOTE
-HeartMate 3 Implanted  12/1/2023  as DT  -HTS Primary  -Implanted by Dr. Washington  -Continue Coumadin, dosing by PharmD.  Goal INR 2.0-3.0 . Subtherapeutic today.   -Antiplatelets Not on ASA  -LDH is stable overall today. Will continue to monitor daily.  -Weaned off midodrine.  -Speed set at  4900   rpm, LSL 4500 rpm   -Interrogation notable for no events  -ECHO 1/2/24 mild TR, LVEDD 6.25 with HM3 set to 4900 rpm   -Not listed for OHTx, declined for OHTX due to comorbidities   -PT/OT/Speech. Recommending rehab but HD + LVAD may be barrier      Procedure: Device Interrogation Including analysis of device parameters  Current Settings: Ventricular Assist Device  Review of device function is stable/unstable stable        1/16/2024     8:17 AM 1/16/2024     4:08 AM 1/15/2024    11:10 PM 1/15/2024     8:05 PM 1/15/2024     3:59 PM 1/15/2024    11:54 AM 1/15/2024     7:53 AM   TXP LVAD INTERROGATIONS   Type HeartMate3 HeartMate3 HeartMate3 HeartMate3 HeartMate3 HeartMate3 HeartMate3   Flow 3.6 3.7 3.8 3.6 3.8 4 3.8   Speed 4900 4900 4900 4950 4900 4900 4900   PI 6.3 6.2 4.6 6.8 5.3 5.4 4.8   Power ([place]) 3.4 3.3 3.3 3.3 3.4 3.4 3.3   LSL 4500   4500 4500 4500 4500   Low Flow Alarm      no no   Pulsatility  Intermittent pulse  Intermittent pulse

## 2024-01-16 NOTE — PROGRESS NOTES
01/16/2024  Monet Aguiar    Current provider:  Sandhya Price MD    Device interrogation:      1/16/2024     8:17 AM 1/16/2024     4:08 AM 1/15/2024    11:10 PM 1/15/2024     8:05 PM 1/15/2024     3:59 PM 1/15/2024    11:54 AM 1/15/2024     7:53 AM   TXP LVAD INTERROGATIONS   Type HeartMate3 HeartMate3 HeartMate3 HeartMate3 HeartMate3 HeartMate3 HeartMate3   Flow 3.6 3.7 3.8 3.6 3.8 4 3.8   Speed 4900 4900 4900 4950 4900 4900 4900   PI 6.3 6.2 4.6 6.8 5.3 5.4 4.8   Power ([place]) 3.4 3.3 3.3 3.3 3.4 3.4 3.3   LSL 4500   4500 4500 4500 4500   Low Flow Alarm      no no   Pulsatility  Intermittent pulse  Intermittent pulse             Rounded on [facility] to ensure all mechanical assist device settings (IABP or VAD) were appropriate and all parameters were within limits.  I was able to ensure all back up equipment was present, the staff had no issues, and the Perfusion Department daily rounding was complete.      For implantable VADs: Interrogation of Ventricular assist device was performed with analysis of device parameters and review of device function. I have personally reviewed the interrogation findings and agree with findings as stated.     In emergency, the nursing units have been notified to contact the perfusion department either by:  Calling [phone] from 630am to 4pm Mon thru Fri, utilizing the On-Call Finder functionality of Epic and searching for Perfusion, or by contacting the hospital  from 4pm to 630am and on weekends and asking to speak with the perfusionist on call.    10:54 AM

## 2024-01-16 NOTE — PT/OT/SLP PROGRESS
Occupational Therapy   Treatment    Name: Radha Abbott  MRN: [id number]  Admitting Diagnosis:  LVAD (left ventricular assist device) present  43 Days Post-Op    Recommendations:     Discharge Recommendations: High Intensity Therapy  Discharge Equipment Recommendations:  hospital bed, wheelchair  Barriers to discharge:  None    Assessment:     Radha Abbott is a 63 y.o. male with a medical diagnosis of LVAD (left ventricular assist device) present.  He presents with impaired ADL and mobility performance deficits. Pt found upright in wheelchair and agreeable for therapy today. Pt reporting fatigue today after PT session however agreeable for sit<stand t/f trials. Pt continues to require x2 person A from sit<stand. Patient has demonstrated sufficient progression to warrant high intensity therapy evidenced by objectives noted below.   Performance deficits affecting function are weakness, gait instability, impaired endurance, impaired balance, impaired self care skills, impaired functional mobility, decreased upper extremity function.     Rehab Prognosis:  Good; patient would benefit from acute skilled OT services to address these deficits and reach maximum level of function.       Plan:     Patient to be seen 5 x/week to address the above listed problems via self-care/home management, therapeutic activities, therapeutic exercises  Plan of Care Expires: 02/03/24  Plan of Care Reviewed with: spouse, patient    Subjective     Chief Complaint: none  Patient/Family Comments/goals: to improve mobility  Pain/Comfort:  Pain Rating 1: other (see comments) (low back pain)  Location - Orientation 1: generalized  Location 1: back  Pain Addressed 1: Reposition, Distraction, Cessation of Activity    Objective:     Communicated with: RN prior to session.  Patient found up in w/c with LVAD, telemetry upon OT entry to room.    General Precautions: Standard, fall    Orthopedic Precautions:N/A  Braces: N/A  Respiratory Status: Room air      Occupational Performance:     Functional Mobility/Transfers:  Patient completed Sit <> Stand Transfer with minimum assistance, moderate assistance, and of 2 persons  with  hand-held assist   Functional Mobility: Pt stood x5 trials today with min A x2 (1 trial), mod A x2 (4 trials) with 1 episode of bilateral buckling. Pt tolerated standing 1 min total on last stand  Pt's wife A with t/f.    AMPAC 6 Click ADL: 16    Treatment & Education:  Pt educated on role of occupational therapy, POC, and safety during ADLs and functional mobility. Pt and OT discussed importance of safe, continued mobility to optimize daily living skills. Pt verbalized understanding.     White board updated during session. Pt given instruction to call for medical staff/nurse for assistance.       Patient left  up in wheelchair  with all lines intact, call button in reach, RN notified, and spouse present    GOALS:   Multidisciplinary Problems       Occupational Therapy Goals          Problem: Occupational Therapy    Goal Priority Disciplines Outcome Interventions   Occupational Therapy Goal     OT, PT/OT Ongoing, Progressing    Description: Goals to be met by: 2/3/24    Patient will increase functional independence with ADLs by performing:    UB Dressing with SBA  LE Dressing with Supervision.  Grooming while standing at sink with Supervision.  Toileting from bedside commode with SBA for hygiene and clothing management.   Step transfer to bedside commode with SBA  [place] with VAD management during ADLs                             Time Tracking:     OT Date of Treatment: 01/16/24  OT Start Time: 1015  OT Stop Time: 1030  OT Total Time (min): 15 min    Billable Minutes:Therapeutic Exercise 15 min    OT/PRIETO: OT     Number of PRIETO visits since last OT visit: 0    1/16/2024

## 2024-01-16 NOTE — PROGRESS NOTES
[facility] - Cardiology Stepdown  Heart Transplant  Progress Note    Patient Name: Radha Abbott  MRN: [id number]  Admission Date: 11/9/2023  Hospital Length of Stay: 68 days  Attending Physician: Sandhya Price MD  Primary Care Provider: Vasu Kong MD  Principal Problem:LVAD (left ventricular assist device) present    Subjective:   Interval History: NAEON. No acute compltains. Last HD session 1/10. Kidney function stable and appears euvolemic.    Continuous Infusions:   sodium chloride 0.9%       Scheduled Meds:   acetaminophen  1,000 mg Per NG tube TID    alteplase  2 mg Intra-Catheter Once    amiodarone  400 mg Oral Daily    balsam peru-castor oiL   Topical (Top) BID    cholecalciferol (vitamin D3)  2,000 Units Per NG tube Daily    epoetin zohaib (PROCRIT) injection  50 Units/kg Subcutaneous Every Tues, Thurs, Sat    famotidine  20 mg Oral Daily    fluticasone propionate  2 spray Each Nostril Daily    gabapentin  100 mg Oral BID    mirtazapine  7.5 mg Oral Nightly    polyethylene glycol  17 g Oral Daily    psyllium husk (aspartame)  1 packet Oral Daily    senna-docusate 8.6-50 mg  2 tablet Oral Daily    traZODone  50 mg Oral QHS    venlafaxine  37.5 mg Oral Daily    warfarin  2.5 mg Oral Daily     PRN Meds:0.9%  NaCl infusion (for blood administration), bisacodyL, dextrose 10%, dextrose 10%, dextrose 10%, dextrose 10%, glucagon (human recombinant), glucose, glucose, heparin (porcine), hydrALAZINE, insulin aspart U-100, ondansetron, Flushing PICC/Midline Protocol **AND** [DISCONTINUED] sodium chloride 0.9% **AND** sodium chloride 0.9%, traMADoL, zolpidem    Review of patient's allergies indicates:  No Known Allergies  Objective:     Vital Signs (Most Recent):  Temp: 97.8 °F (36.6 °C) (01/16/24 0730)  Pulse: 94 (01/16/24 1005)  Resp: 18 (01/16/24 0730)  BP: (!) 82/0 (01/16/24 0730)  SpO2: 95 % (01/16/24 0730) Vital Signs (24h Range):  Temp:  [97.6 °F (36.4 °C)-98 °F (36.7 °C)] 97.8 °F (36.6 °C)  Pulse:   [] 94  Resp:  [16-18] 18  SpO2:  [95 %-96 %] 95 %  BP: ()/(0-82) 82/0     Patient Vitals for the past 72 hrs (Last 3 readings):   Weight   01/16/24 0838 70.9 kg (156 lb 4.9 oz)   01/15/24 0739 72 kg (158 lb 11.7 oz)       Body mass index is 21.2 kg/m².      Intake/Output Summary (Last 24 hours) at 1/16/2024 1043  Last data filed at 1/16/2024 0745  Gross per 24 hour   Intake 702 ml   Output 200 ml   Net 502 ml         Hemodynamic Parameters:       Telemetry: SR       Physical Exam  Constitutional:       Comments: Cachectic, temporal wasting   HENT:      Head: Normocephalic and atraumatic.   Eyes:      Conjunctiva/sclera: Conjunctivae normal.      Pupils: Pupils are equal, round, and reactive to light.   Neck:      Comments: Do not appreciate elevated JVP  Cardiovascular:      Rate and Rhythm: Normal rate and regular rhythm.      Comments: Smooth VAD hum  Pulmonary:      Effort: Pulmonary effort is normal.      Breath sounds: Normal breath sounds.   Abdominal:      General: Bowel sounds are normal.      Palpations: Abdomen is soft.   Musculoskeletal:         General: No swelling. Normal range of motion.      Cervical back: Normal range of motion and neck supple.   Skin:     General: Skin is warm and dry.      Capillary Refill: Capillary refill takes 2 to 3 seconds.   Neurological:      General: No focal deficit present.      Mental Status: He is alert and oriented to person, place, and time.            Significant Labs:  CBC:  Recent Labs   Lab 01/14/24  0508 01/15/24  0315 01/16/24  0244   WBC 9.08 6.81 6.15   RBC 2.38* 2.49* 2.41*   HGB 6.9* 7.0* 6.9*   HCT 21.8* 22.8* 22.6*    401 374   MCV 92 92 94   MCH 29.0 28.1 28.6   MCHC 31.7* 30.7* 30.5*       BNP:  Recent Labs   Lab 01/10/24  0338 01/12/24  0535 01/15/24  0315   * 830* 748*       CMP:  Recent Labs   Lab 01/14/24  0508 01/15/24  0314 01/16/24  0244   GLU 94 93 89   CALCIUM 9.2 8.8 8.6*   ALBUMIN 1.9* 2.0* 2.0*   PROT 7.2 7.2 7.0     139 137   K 4.1 4.1 4.9   CO2 23 24 22*    104 105   BUN 90* 100* 90*   CREATININE 5.1* 4.8* 4.6*   ALKPHOS 131 137* 138*   ALT 23 29 37   AST 27 33 43*   BILITOT 0.3 0.2 0.2        Coagulation:   Recent Labs   Lab 01/14/24  0508 01/15/24  0315 01/16/24  0244   INR 1.9* 2.1* 2.7*       LDH:  Recent Labs   Lab 01/14/24  0508 01/15/24  0315 01/16/24  0244   * 297* 308*       Microbiology:  Microbiology Results (last 7 days)       Procedure Component Value Units Date/Time    Fungus culture [[id number]] Collected: 12/06/23 1532    Order Status: Completed Specimen: Body Fluid from Lung, Right Updated: 01/09/24 1154     Fungus (Mycology) Culture No fungus isolated after 4 weeks    Narrative:      Bronchial Wash            I have reviewed all pertinent labs within the past 24 hours.    Estimated Creatinine Clearance: 16.9 mL/min (A) (based on SCr of 4.6 mg/dL (H)).    Diagnostic Results:  I have reviewed and interpreted all pertinent imaging results/findings within the past 24 hours.  Assessment and Plan:     63 year old male with hx of CAD s/p 3v CABG (unclear anatomy, 2009), ICM with a recent EF of 15-20% s/p ICD (medtronik 2009), DM2 (a1c 7.7), HTN, HLD, Vfib on amio who presents to the ED with CC of SOB     Pt was recently admitted to [facility] as a transfer.  He was started on a Lasix gtt and did well.  Started on gDMT and discharged home on  5 with plans to follow up in HTS clinic for ongoing transplant evaluation at another facility.  He says that about a few days ago he started to notice LE swelling.  This turned into Nelson and orthopnea.  He says he can't walk to the bathroom without getting SOB.  He also complains of weight gain.  He takes torsemide 20mg BID at home and was told to trial additional Lasix which he did without any improvement.  He was rx metolazone but has not been filled.  He came to the ED     In the ED he was AF with stable VS on RA.  CBC showing chronic anemia.  CMP notable for  acute on chronic CKD with baseline around 2.1 and Cr now 2.6.  BNP elevated.  Lactate neg.  CXR showing pulmonary edema.  I evaluated the pt at the bedside.  Bedside TTE showing CVP >15.  He was subsequently admitted to the CCU for diuresis.     He was continued on his home  and was started on a lasix gtt, which he responded well to overnight (net -1700cc. He feels much better this morning as well. Our transplant coordinators have been working on insurance approval and he is now being transferred to [facility] service for transplant evaluation.     * LVAD (left ventricular assist device) present  -HeartMate 3 Implanted  12/1/2023  as DT  -HTS Primary  -Implanted by Dr. Washington  -Continue Coumadin, dosing by PharmD.  Goal INR 2.0-3.0 . Subtherapeutic today.   -Antiplatelets Not on ASA  -LDH is stable overall today. Will continue to monitor daily.  -Weaned off midodrine.  -Speed set at  4900   rpm, LSL 4500 rpm   -Interrogation notable for no events  -ECHO 1/2/24 mild TR, LVEDD 6.25 with HM3 set to 4900 rpm   -Not listed for OHTx, declined for OHTX due to comorbidities   -PT/OT/Speech. Recommending rehab but HD + LVAD may be barrier      Procedure: Device Interrogation Including analysis of device parameters  Current Settings: Ventricular Assist Device  Review of device function is stable/unstable stable        1/16/2024     8:17 AM 1/16/2024     4:08 AM 1/15/2024    11:10 PM 1/15/2024     8:05 PM 1/15/2024     3:59 PM 1/15/2024    11:54 AM 1/15/2024     7:53 AM   TXP LVAD INTERROGATIONS   Type HeartMate3 HeartMate3 HeartMate3 HeartMate3 HeartMate3 HeartMate3 HeartMate3   Flow 3.6 3.7 3.8 3.6 3.8 4 3.8   Speed 4900 4900 4900 4950 4900 4900 4900   PI 6.3 6.2 4.6 6.8 5.3 5.4 4.8   Power ([place]) 3.4 3.3 3.3 3.3 3.4 3.4 3.3   LSL 4500   4500 4500 4500 4500   Low Flow Alarm      no no   Pulsatility  Intermittent pulse  Intermittent pulse            Right parotid mass  - 2.2cm R. Parotid mass noted.   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 Can follow up as outpatient w/ ENT for FNA. No further interventions needed as inpatient.     Lymphadenopathy  +ill contacts  Respiratory panel negative  Strep test negative.   Throat culture showing pseudomonas. As per ID , will treat w/ 5 day course of zosyn (therapy completed 1/10)    Unilateral complete vocal fold paralysis  -Appreciate ENT's help  -S/P augmentation of paralyzed left vocal cord 12/18    Dysphonia  -Speech following closely  -Appreciate ENT's help. S/P augmentation of paralyzed left vocal cord 12/18    Moderate malnutrition  -RD and SLP following  -Tolerating tube feeds. Total daily caloric intake increased to 2160  -Failed MBBS 12/20, continue TF.  -Speech following, appreciate their continued recommendations     Depressed mood  -Psychiatry consulted for depressed mood, SI when left alone  -Recommend sitter when alone (possibly with transition to stepdown).  -Psych reconsulted 1/2, agree with venlafaxine and trazadone    IVÁN (acute kidney injury)  -Nephrology following      Acute on chronic combined systolic and diastolic CHF, NYHA class 4  Pt with known ICM with an EF of 25% on home  presented with decompensated HF.    -S/P LVAD    PAF (paroxysmal atrial fibrillation)  Known hx of pAF. In sinus rhythm on admission, but 1 run of AF RVR overnight. He spontaneously converted.  - Continue home amiodarone 400mg qd  - Continue AC, on hold with supratherapeutic INR       Acute renal failure with acute tubular necrosis superimposed on stage 3b chronic kidney disease  IVÁN on CKD2. Baseline Cr of 1.7-2.0.   - Hold ARNi  -Trend BMPs daily   -Nephrology following   -SLED/SCUF for volume removal began on 12/1. He is anuric  -Did not respond to diuretic challenge with 240 mg IV Lasix, 1000 mg Diuril, and 500 mg IV Diamox done 12/14  -Diuretic challenge 1/8 with 160 mg IVP Lasix,   -total  cc/24 hours   -Now on HD TThSat; but last HD was on 1/10  -Tunneled trialysis line  placed 12/22 by IR    Type 2 diabetes mellitus without complication, without long-term current use of insulin  -A1c 7.6, not insulin dependent  - Endocrine following, appreciate assistance with blood glucose management    CAD (coronary artery disease)  -S/p 3vCABG in 2009  -Lipitor on hold 2/2 mild transaminitis  -Not on ASA post VAD    Ventricular tachycardia  Hx VT s/p ICD placement (medtronic 2009)  - Continue home amio 400mg qd        [facility]  Heart Transplant  [facility] - Cardiology Stepdown

## 2024-01-16 NOTE — PLAN OF CARE
Plan of care discussed with patient. Patient AOX4 and VS stable. Patient up with max assist, fall precautions in place. LVAD DP and numbers WNL, smooth LVAD hum. Patient complains of hip pain, treated with PRN and scheduled pain meds. Discussed medications and care. Patient has no questions at this time.

## 2024-01-16 NOTE — PT/OT/SLP PROGRESS
Speech Language Pathology Treatment    Patient Name:  Radha Abbott   MRN:  [id number]  Admitting Diagnosis: LVAD (left ventricular assist device) present    Recommendations:                 General Recommendations:  Dysphagia therapy and Modified barium swallow study  Diet recommendations:  Minced & Moist Diet - IDDSI Level 5, Liquid Diet Level: Moderately thick liquids - IDDSI Level 3-   all liquids should be thickened to a honey thick consistency- pt is a silent aspirator per most recent MBSS   Ice chips only with dysphagia exercises   Encourage double swallows between bites and sips     Aspiration Precautions: Strict aspiration precautions   General Precautions: Standard, fall  Communication strategies:  none    Assessment:     Radha Abbott is a 63 y.o. male with an SLP diagnosis of Dysphagia.      Subjective     Pt seen with family at the bedside.   Patient goals: none stated     Pain/Comfort:   0/10    Respiratory Status: Room air    Objective:     Has the patient been evaluated by SLP for swallowing?   Yes  Keep patient NPO? No   Current Respiratory Status:    Room air     Pt seen for ongoing dysphagia tx. Pt seen without NG tube in place. Pt stating he consumed AM meal tray and is with a good appetite. Pt/family member stating pt has been consuming thin liquids (thin liquids seen at the bedside) despite ongoing recommendation for honey thick liquids. Education was provided on reasoning for diet recommendation and risk for silent aspiration/meaning of silent aspiration. Pt/family member verbalizing understanding, however would benefit from ongoing education. Pt seen with a wet/congested vocal quality this date, pt able to clear throat, however unable to produce a second swallow. Family stating pt is with a recent vocal quality change, however appearing to be back to normal this morning per family. Vocal quality appearing slightly hoarse this date, however with adequate intensity. Family inquiring about ongoing SLP  POC, questions answered to completion. Recommend pt continue a minced/moist diet and honey thick liquids and repeat MBSS before advancing diet further. SLP will continue to follow     Goals:   Multidisciplinary Problems       SLP Goals          Problem: SLP    Goal Priority Disciplines Outcome   SLP Goal     SLP Ongoing, Progressing   Description: Speech Language Pathology Goals  Goals expected to be met by 12/24    1. Pt will participate in ongoing assessment of swallow function to help determine least restrictive diet                            Plan:     Patient to be seen:  4 x/week   Plan of Care reviewed with:  patient, spouse   SLP Follow-Up:  Yes       Discharge recommendations:  High Intensity Therapy   Barriers to Discharge:  None    Time Tracking:     SLP Treatment Date:   01/16/24  Speech Start Time:  0930  Speech Stop Time:  0940     Speech Total Time (min):  10 min    Billable Minutes: Self Care/Home Management Training 10    01/16/2024

## 2024-01-16 NOTE — PT/OT/SLP PROGRESS
Physical Therapy Treatment    Patient Name:  Radha Mora   MRN:  [id number]    Recommendations:     Discharge Recommendations: High Intensity Therapy  Discharge Equipment Recommendations: wheelchair, hospital bed  Barriers to discharge:  inc level of assist required    Assessment:     Radha Mora is a 63 y.o. male admitted with a medical diagnosis of LVAD (left ventricular assist device) present.  He presents with the following impairments/functional limitations: weakness, impaired endurance, impaired self care skills, impaired functional mobility, gait instability, impaired balance, decreased upper extremity function, decreased lower extremity function, decreased safety awareness, pain, impaired cardiopulmonary response to activity. Pt improving with mobility, able to ambulate in mora w/ Yonis to maintain RW within a safe proximity to pt as he tends to push it out forward. Patient has demonstrated sufficient progression to warrant high intensity therapy evidenced by objectives noted below.     Rehab Prognosis: Good; patient would benefit from acute skilled PT services to address these deficits and reach maximum level of function.    Recent Surgery: Procedure(s) (LRB):  CLOSURE, WOUND, STERNUM (N/A)  INSERTION, GRAFT, PERICARDIUM  DRAINAGE, PLEURAL EFFUSION 43 Days Post-Op    Plan:     During this hospitalization, patient to be seen 5 x/week to address the identified rehab impairments via gait training, therapeutic activities, therapeutic exercises, neuromuscular re-education and progress toward the following goals:    Plan of Care Expires:  02/05/24    Subjective     Chief Complaint: R hip pain  Patient/Family Comments/goals: pt wants to get back to walking and back home  Pain/Comfort:  Pain Rating 1: 6/10  Location - Side 1: Right  Location - Orientation 1: posterior  Location 1: hip  Pain Addressed 1: Reposition, Distraction, Pre-medicate for activity, Nurse notified  Pain Rating Post-Intervention 1:  7/10      Objective:     Communicated with RN prior to session.  Patient found  up in w/c  with LVAD, telemetry upon PT entry to room.     General Precautions: Standard, fall, LVAD  Orthopedic Precautions: N/A  Braces: N/A  Respiratory Status: Room air     Functional Mobility:  Transfers:     Sit to Stand:  maximal assistance with platform walker  Gait: 88' and 80' w/ platform walker Yonis w/ seated rest between trials and multiple standing rest breaks during gait trials; Gait training w/ verbal, visual, and tactile cueing for proper use of AD, step length, postural control, safety awareness, obstacle avoidance, width of AUR[place]A, and proximity to AD. LVAD emergency bag present at all times throughout session.      AM-PAC 6 CLICK MOBILITY  Turning over in bed (including adjusting bedclothes, sheets and blankets)?: 3  Sitting down on and standing up from a chair with arms (e.g., wheelchair, bedside commode, etc.): 2  Moving from lying on back to sitting on the side of the bed?: 3  Moving to and from a bed to a chair (including a wheelchair)?: 2  Need to walk in hospital room?: 3  Climbing 3-5 steps with a railing?: 1  Basic Mobility Total Score: 14       Treatment & Education:  Pt educated on PT POC/goals, d/c recs, and continued treatment. All questions answered and pt in agreement w/ POC.   Pt educated on exercises to improve upright posture and posterior chain strength in order to assist w/ ability to stand up straight    Patient left  up in w/c  with all lines intact, call button in reach, RN notified, and spouse present..    GOALS:   Multidisciplinary Problems       Physical Therapy Goals          Problem: Physical Therapy    Goal Priority Disciplines Outcome Goal Variances Interventions   Physical Therapy Goal     PT, PT/OT Ongoing, Progressing     Description: Goals to be met by: 23     Patient will increase functional independence with mobility by performin. Sit to stand transfer with modified  independence  2. Bed to chair transfer with supervision  3. Gait  x 150 feet with supervision using physician approved AD with standing rest breaks prn.   4. Lower extremity exercise program x30 reps per handout, with independence  5. Recite 3/3 sternal precautions and remain complaint with precautions throughout session with no verbal cues                     Multidisciplinary Problems (Resolved)          Problem: Physical Therapy    Goal Priority Disciplines Outcome Goal Variances Interventions   Physical Therapy Goal   (Resolved)     PT, PT/OT Met     Description: Goals to be met by: 23     Patient will increase functional independence with mobility by performin. Evaluate pt's need for physical therapy.                          Time Tracking:     PT Received On: 24  PT Start Time: 08     PT Stop Time: 0855  PT Total Time (min): 24 min     Billable Minutes: Gait Training 15 and Therapeutic Exercise 9    Treatment Type: Treatment  PT/PTA: PT     Number of PTA visits since last PT visit: 0     2024

## 2024-01-16 NOTE — CARE UPDATE
Care Update:     No acute events overnight. Patient in room 312/312 A. Blood glucose stable on current inpatient regimen. 43 Days Post-Op    Steroid use- None.   Renal function-   Lab Results   Component Value Date    CREATININE 4.6 (H) 01/16/2024        Diet Minced & Moist (IDDSI Level 5) Honey Thick (Moderately Thick)     POCT Glucose   Date Value Ref Range Status   01/16/2024 101 70 - 110 mg/dL Final   01/15/2024 149 (H) 70 - 110 mg/dL Final   01/15/2024 159 (H) 70 - 110 mg/dL Final   01/15/2024 155 (H) 70 - 110 mg/dL Final   01/15/2024 169 (H) 70 - 110 mg/dL Final   01/14/2024 207 (H) 70 - 110 mg/dL Final   01/14/2024 206 (H) 70 - 110 mg/dL Final   01/14/2024 193 (H) 70 - 110 mg/dL Final   01/14/2024 96 70 - 110 mg/dL Final   01/13/2024 94 70 - 110 mg/dL Final   01/13/2024 176 (H) 70 - 110 mg/dL Final   01/13/2024 212 (H) 70 - 110 mg/dL Final     Lab Results   Component Value Date    HGBA1C 5.1 01/13/2024         Home Medications: Metformin (off since October)       Endocrine  Type 2 diabetes mellitus without complication, without long-term current use of insulin  BG goal 140-180.      - LDC (150/50) prn AC/HS   - POCT Glucose every AC/HS  - Hypoglycemia protocol in place      ** Please notify Endocrine for any change and/or advance in diet**  ** Please call Endocrine for any BG related issues **     Discharge Planning:   TBD. Please notify endocrinology prior to discharge

## 2024-01-16 NOTE — SUBJECTIVE & OBJECTIVE
Subjective:     HPI:  Radha Abbott is a 63 year old male with hx of CAD s/p 3v CABG (unclear anatomy, 2009), ICM with a recent EF of 15-20% s/p ICD (medmauricio 2009), DM2 (a1c 7.7), HTN, HLD, Vfib on amio who presents to the ED with CC of SOB.     Pt was recently admitted to [facility] as a transfer.  He was started on a Lasix gtt and did well.  Started on gDMT and discharged home on  5 with plans to follow up in HTS clinic for ongoing transplant evaluation at another facility.  He says that about a few days ago he started to notice LE swelling.  This turned into Nelson and orthopnea.  He says he can't walk to the bathroom without getting SOB.  He also complains of weight gain.  He takes torsemide 20mg BID at home and was told to trial additional Lasix which he did without any improvement.  He was rx metolazone but has not been filled.  He came to the ED     In the ED he was AF with stable VS on RA.  CBC showing chronic anemia.  CMP notable for acute on chronic CKD with baseline around 2.1 and Cr now 2.6.  BNP elevated.  Lactate neg.  CXR showing pulmonary edema.  I evaluated the pt at the bedside.  Bedside TTE showing CVP >15.  He was subsequently admitted to the CCU for diuresis. Skin integrity ENRRIQUE consulted for evaluation of skin injury.    Hospital Course:   No notes on file      Follow-up For: Procedure(s) (LRB):  CLOSURE, WOUND, STERNUM (N/A)  INSERTION, GRAFT, PERICARDIUM  DRAINAGE, PLEURAL EFFUSION    Post-Operative Day: 43 Days Post-Op    Scheduled Meds:   acetaminophen  1,000 mg Per NG tube TID    alteplase  2 mg Intra-Catheter Once    amiodarone  400 mg Oral Daily    balsam peru-castor oiL   Topical (Top) BID    cholecalciferol (vitamin D3)  2,000 Units Per NG tube Daily    epoetin zohaib (PROCRIT) injection  50 Units/kg Subcutaneous Every Tues, Thurs, Sat    famotidine  20 mg Oral Daily    fluticasone propionate  2 spray Each Nostril Daily    gabapentin  100 mg Oral BID    mirtazapine  7.5 mg Oral Nightly     polyethylene glycol  17 g Oral Daily    psyllium husk (aspartame)  1 packet Oral Daily    senna-docusate 8.6-50 mg  2 tablet Oral Daily    traZODone  50 mg Oral QHS    venlafaxine  37.5 mg Oral Daily    warfarin  5 mg Oral Daily     Continuous Infusions:   sodium chloride 0.9%       PRN Meds:0.9%  NaCl infusion (for blood administration), bisacodyL, dextrose 10%, dextrose 10%, dextrose 10%, dextrose 10%, glucagon (human recombinant), glucose, glucose, heparin (porcine), hydrALAZINE, insulin aspart U-100, ondansetron, Flushing PICC/Midline Protocol **AND** [DISCONTINUED] sodium chloride 0.9% **AND** sodium chloride 0.9%, traMADoL, zolpidem    Review of Systems   Skin:  Positive for wound.     Objective:     Vital Signs (Most Recent):  Temp: 97.8 °F (36.6 °C) (01/16/24 0730)  Pulse: 94 (01/16/24 0730)  Resp: 18 (01/16/24 0730)  BP: (!) 82/0 (01/16/24 0730)  SpO2: 95 % (01/16/24 0730) Vital Signs (24h Range):  Temp:  [97.6 °F (36.4 °C)-98 °F (36.7 °C)] 97.8 °F (36.6 °C)  Pulse:  [] 94  Resp:  [16-18] 18  SpO2:  [95 %-96 %] 95 %  BP: ()/(0-82) 82/0     Weight: 70.9 kg (156 lb 4.9 oz)  Body mass index is 21.2 kg/m².     Physical Exam  Constitutional:       Appearance: Normal appearance.   Skin:     General: Skin is warm and dry.      Findings: Lesion present.   Neurological:      Mental Status: He is alert.          Laboratory:  All pertinent labs reviewed within the last 24 hours.    Diagnostic Results:  None

## 2024-01-17 LAB
ALBUMIN SERPL BCP-MCNC: 2 G/DL (ref 3.5–5.2)
ALP SERPL-CCNC: 139 U/L (ref 55–135)
ALT SERPL W/O P-5'-P-CCNC: 38 U/L (ref 10–44)
ANION GAP SERPL CALC-SCNC: 10 MMOL/L (ref 8–16)
AST SERPL-CCNC: 36 U/L (ref 10–40)
BASOPHILS # BLD AUTO: 0.06 K/UL (ref 0–0.2)
BASOPHILS NFR BLD: 0.9 % (ref 0–1.9)
BILIRUB DIRECT SERPL-MCNC: 0.1 MG/DL (ref 0.1–0.3)
BILIRUB SERPL-MCNC: 0.2 MG/DL (ref 0.1–1)
BNP SERPL-MCNC: 906 PG/ML (ref 0–99)
BUN SERPL-MCNC: 81 MG/DL (ref 8–23)
CA-I BLDV-SCNC: 1.11 MMOL/L (ref 1.06–1.42)
CALCIUM SERPL-MCNC: 8.7 MG/DL (ref 8.7–10.5)
CHLORIDE SERPL-SCNC: 106 MMOL/L (ref 95–110)
CO2 SERPL-SCNC: 20 MMOL/L (ref 23–29)
CREAT SERPL-MCNC: 4.3 MG/DL (ref 0.5–1.4)
CRP SERPL-MCNC: 101.2 MG/L (ref 0–8.2)
DIFFERENTIAL METHOD BLD: ABNORMAL
EOSINOPHIL # BLD AUTO: 0.2 K/UL (ref 0–0.5)
EOSINOPHIL NFR BLD: 3 % (ref 0–8)
ERYTHROCYTE [DISTWIDTH] IN BLOOD BY AUTOMATED COUNT: 17 % (ref 11.5–14.5)
EST. GFR  (NO RACE VARIABLE): 14.9 ML/MIN/1.73 M^2
GLUCOSE SERPL-MCNC: 78 MG/DL (ref 70–110)
HCT VFR BLD AUTO: 22.7 % (ref 40–54)
HGB BLD-MCNC: 6.9 G/DL (ref 14–18)
IMM GRANULOCYTES # BLD AUTO: 0.03 K/UL (ref 0–0.04)
IMM GRANULOCYTES NFR BLD AUTO: 0.5 % (ref 0–0.5)
INR PPP: 3.2 (ref 0.8–1.2)
LDH SERPL L TO P-CCNC: 304 U/L (ref 110–260)
LYMPHOCYTES # BLD AUTO: 1 K/UL (ref 1–4.8)
LYMPHOCYTES NFR BLD: 16.2 % (ref 18–48)
MAGNESIUM SERPL-MCNC: 2 MG/DL (ref 1.6–2.6)
MCH RBC QN AUTO: 28.5 PG (ref 27–31)
MCHC RBC AUTO-ENTMCNC: 30.4 G/DL (ref 32–36)
MCV RBC AUTO: 94 FL (ref 82–98)
MONOCYTES # BLD AUTO: 0.5 K/UL (ref 0.3–1)
MONOCYTES NFR BLD: 8 % (ref 4–15)
NEUTROPHILS # BLD AUTO: 4.5 K/UL (ref 1.8–7.7)
NEUTROPHILS NFR BLD: 71.4 % (ref 38–73)
NRBC BLD-RTO: 0 /100 WBC
PHOSPHATE SERPL-MCNC: 4.6 MG/DL (ref 2.7–4.5)
PLATELET # BLD AUTO: 411 K/UL (ref 150–450)
PMV BLD AUTO: 9.1 FL (ref 9.2–12.9)
POCT GLUCOSE: 106 MG/DL (ref 70–110)
POCT GLUCOSE: 124 MG/DL (ref 70–110)
POCT GLUCOSE: 152 MG/DL (ref 70–110)
POCT GLUCOSE: 76 MG/DL (ref 70–110)
POTASSIUM SERPL-SCNC: 5 MMOL/L (ref 3.5–5.1)
PREALB SERPL-MCNC: 33 MG/DL (ref 20–43)
PROT SERPL-MCNC: 7 G/DL (ref 6–8.4)
PROTHROMBIN TIME: 32.2 SEC (ref 9–12.5)
RBC # BLD AUTO: 2.42 M/UL (ref 4.6–6.2)
SODIUM SERPL-SCNC: 136 MMOL/L (ref 136–145)
WBC # BLD AUTO: 6.36 K/UL (ref 3.9–12.7)

## 2024-01-17 PROCEDURE — 20600001 HC STEP DOWN PRIVATE ROOM

## 2024-01-17 PROCEDURE — 25000003 PHARM REV CODE 250: Performed by: STUDENT IN AN ORGANIZED HEALTH CARE EDUCATION/TRAINING PROGRAM

## 2024-01-17 PROCEDURE — 86140 C-REACTIVE PROTEIN: CPT | Performed by: STUDENT IN AN ORGANIZED HEALTH CARE EDUCATION/TRAINING PROGRAM

## 2024-01-17 PROCEDURE — 93750 INTERROGATION VAD IN PERSON: CPT | Mod: ,,, | Performed by: INTERNAL MEDICINE

## 2024-01-17 PROCEDURE — 83615 LACTATE (LD) (LDH) ENZYME: CPT | Performed by: STUDENT IN AN ORGANIZED HEALTH CARE EDUCATION/TRAINING PROGRAM

## 2024-01-17 PROCEDURE — 25000003 PHARM REV CODE 250: Performed by: INTERNAL MEDICINE

## 2024-01-17 PROCEDURE — 36415 COLL VENOUS BLD VENIPUNCTURE: CPT | Mod: XB | Performed by: NURSE PRACTITIONER

## 2024-01-17 PROCEDURE — 85025 COMPLETE CBC W/AUTO DIFF WBC: CPT | Performed by: PHYSICIAN ASSISTANT

## 2024-01-17 PROCEDURE — 82330 ASSAY OF CALCIUM: CPT | Performed by: INTERNAL MEDICINE

## 2024-01-17 PROCEDURE — 27000248 HC VAD-ADDITIONAL DAY

## 2024-01-17 PROCEDURE — 84134 ASSAY OF PREALBUMIN: CPT | Performed by: NURSE PRACTITIONER

## 2024-01-17 PROCEDURE — 99233 SBSQ HOSP IP/OBS HIGH 50: CPT | Mod: ,,, | Performed by: INTERNAL MEDICINE

## 2024-01-17 PROCEDURE — 99233 SBSQ HOSP IP/OBS HIGH 50: CPT | Mod: ,,, | Performed by: PHYSICIAN ASSISTANT

## 2024-01-17 PROCEDURE — 83735 ASSAY OF MAGNESIUM: CPT | Performed by: INTERNAL MEDICINE

## 2024-01-17 PROCEDURE — 83880 ASSAY OF NATRIURETIC PEPTIDE: CPT | Performed by: STUDENT IN AN ORGANIZED HEALTH CARE EDUCATION/TRAINING PROGRAM

## 2024-01-17 PROCEDURE — 90832 PSYTX W PT 30 MINUTES: CPT | Mod: ,,, | Performed by: STUDENT IN AN ORGANIZED HEALTH CARE EDUCATION/TRAINING PROGRAM

## 2024-01-17 PROCEDURE — 85610 PROTHROMBIN TIME: CPT | Performed by: PHYSICIAN ASSISTANT

## 2024-01-17 PROCEDURE — 84100 ASSAY OF PHOSPHORUS: CPT | Performed by: INTERNAL MEDICINE

## 2024-01-17 PROCEDURE — 80048 BASIC METABOLIC PNL TOTAL CA: CPT | Performed by: INTERNAL MEDICINE

## 2024-01-17 PROCEDURE — 80076 HEPATIC FUNCTION PANEL: CPT | Performed by: INTERNAL MEDICINE

## 2024-01-17 PROCEDURE — 25000003 PHARM REV CODE 250: Performed by: PHYSICIAN ASSISTANT

## 2024-01-17 PROCEDURE — 27000207 HC ISOLATION

## 2024-01-17 PROCEDURE — 36415 COLL VENOUS BLD VENIPUNCTURE: CPT | Performed by: NURSE PRACTITIONER

## 2024-01-17 PROCEDURE — 97116 GAIT TRAINING THERAPY: CPT

## 2024-01-17 PROCEDURE — 4A023N6 MEASUREMENT OF CARDIAC SAMPLING AND PRESSURE, RIGHT HEART, PERCUTANEOUS APPROACH: ICD-10-PCS | Performed by: INTERNAL MEDICINE

## 2024-01-17 PROCEDURE — 82610 CYSTATIN C: CPT | Performed by: NURSE PRACTITIONER

## 2024-01-17 RX ORDER — INSULIN ASPART 100 [IU]/ML
0-5 INJECTION, SOLUTION INTRAVENOUS; SUBCUTANEOUS
Status: DISCONTINUED | OUTPATIENT
Start: 2024-01-17 | End: 2024-01-24 | Stop reason: HOSPADM

## 2024-01-17 RX ORDER — CHOLECALCIFEROL (VITAMIN D3) 25 MCG
1000 TABLET ORAL DAILY
Status: DISCONTINUED | OUTPATIENT
Start: 2024-01-18 | End: 2024-01-24 | Stop reason: HOSPADM

## 2024-01-17 RX ADMIN — AMIODARONE HYDROCHLORIDE 400 MG: 200 TABLET ORAL at 09:01

## 2024-01-17 RX ADMIN — FLUTICASONE PROPIONATE 100 MCG: 50 SPRAY, METERED NASAL at 09:01

## 2024-01-17 RX ADMIN — GABAPENTIN 100 MG: 100 CAPSULE ORAL at 08:01

## 2024-01-17 RX ADMIN — Medication: at 09:01

## 2024-01-17 RX ADMIN — ACETAMINOPHEN 1000 MG: 500 TABLET ORAL at 03:01

## 2024-01-17 RX ADMIN — TRAZODONE HYDROCHLORIDE 25 MG: 50 TABLET ORAL at 08:01

## 2024-01-17 RX ADMIN — SENNOSIDES AND DOCUSATE SODIUM 2 TABLET: 8.6; 5 TABLET ORAL at 09:01

## 2024-01-17 RX ADMIN — Medication 2000 UNITS: at 09:01

## 2024-01-17 RX ADMIN — VENLAFAXINE 37.5 MG: 37.5 TABLET ORAL at 09:01

## 2024-01-17 RX ADMIN — GABAPENTIN 100 MG: 100 CAPSULE ORAL at 09:01

## 2024-01-17 RX ADMIN — POLYETHYLENE GLYCOL 3350 17 G: 17 POWDER, FOR SOLUTION ORAL at 09:01

## 2024-01-17 RX ADMIN — MIRTAZAPINE 7.5 MG: 7.5 TABLET, FILM COATED ORAL at 08:01

## 2024-01-17 RX ADMIN — ACETAMINOPHEN 1000 MG: 500 TABLET ORAL at 09:01

## 2024-01-17 RX ADMIN — ACETAMINOPHEN 1000 MG: 500 TABLET ORAL at 08:01

## 2024-01-17 RX ADMIN — FAMOTIDINE 20 MG: 20 TABLET ORAL at 09:01

## 2024-01-17 NOTE — PROGRESS NOTES
Rounded on patient at bedside. Patient is A&Ox 4.  LVAD history interrogated with no abnormal events noted.  LVAD numbers WNL: compared to baseline. Pt denies any needs at this time.  Patient reported having dressing supplies in the room for discharge. Patient reported no equipment needs. A copy of patient's workbook made, original returned to patient. Encouraged pt to notify nurse if they have any questions, problems or concerns for LVAD coordinator.  Pt verbalized understanding and in agreement of plan. Plan for team to continue to round on patient.

## 2024-01-17 NOTE — PLAN OF CARE
Problem: Adult Inpatient Plan of Care  Goal: Plan of Care Review  Flowsheets (Taken 1/17/2024 1639)  Plan of Care Reviewed With:   patient   spouse  Goal: Readiness for Transition of Care  Intervention: Mutually Develop Transition Plan  Flowsheets (Taken 1/17/2024 1639)  Equipment Currently Used at Home: walker, standard  Transportation Anticipated: family or friend will provide  Communicated STANLEY with patient/caregiver: Yes  Do you expect to return to your current living situation?: Yes  Do you have help at home or someone to help you manage your care at home?: Yes     Problem: Adult Inpatient Plan of Care  Goal: Readiness for Transition of Care  Intervention: Mutually Develop Transition Plan  Flowsheets (Taken 1/17/2024 1639)  Equipment Currently Used at Home: walker, standard  Transportation Anticipated: family or friend will provide  Communicated STANLEY with patient/caregiver: Yes  Do you expect to return to your current living situation?: Yes  Do you have help at home or someone to help you manage your care at home?: Yes     Problem: Diabetes Comorbidity  Goal: Blood Glucose Level Within Targeted Range  Intervention: Monitor and Manage Glycemia  Flowsheets (Taken 1/17/2024 1639)  Glycemic Management: blood glucose monitored     Problem: Oral Intake Inadequate (Acute Kidney Injury/Impairment)  Goal: Optimal Nutrition Intake  Intervention: Promote and Optimize Nutrition  Flowsheets (Taken 1/17/2024 1639)  Oral Nutrition Promotion:   rest periods promoted   safe use of adaptive equipment encouraged     Problem: Adjustment to Illness (Heart Failure)  Goal: Optimal Coping  Intervention: Support Psychosocial Response  Flowsheets (Taken 1/17/2024 1639)  Supportive Measures:   active listening utilized   decision-making supported   goal-setting facilitated   positive reinforcement provided  Life Transition/Adjustment: advance care planning facilitated

## 2024-01-17 NOTE — SUBJECTIVE & OBJECTIVE
Interval History: Scr 4.3 from 4.6. K 5.0. 24 . Appears euvolemic on exam. Oxygenating well on RA.     Review of patient's allergies indicates:  No Known Allergies  Current Facility-Administered Medications   Medication Frequency    0.9%  NaCl infusion (for blood administration) Q24H PRN    0.9%  NaCl infusion Continuous    acetaminophen tablet 1,000 mg TID    alteplase injection 2 mg Once    amiodarone tablet 400 mg Daily    balsam peru-castor oiL Oint BID    bisacodyL suppository 10 mg Daily PRN    dextrose 10% bolus 125 mL 125 mL PRN    dextrose 10% bolus 125 mL 125 mL PRN    dextrose 10% bolus 250 mL 250 mL PRN    dextrose 10% bolus 250 mL 250 mL PRN    epoetin zohaib injection 8,100 Units Every Tues, Thurs, Sat    famotidine tablet 20 mg Daily    fluticasone propionate 50 mcg/actuation nasal spray 100 mcg Daily    gabapentin capsule 100 mg BID    glucagon (human recombinant) injection 1 mg PRN    glucose chewable tablet 16 g PRN    glucose chewable tablet 24 g PRN    heparin (porcine) injection 1,000 Units PRN    hydrALAZINE injection 10 mg Q6H PRN    insulin aspart U-100 pen 0-5 Units QID (AC + HS) PRN    mirtazapine tablet 7.5 mg Nightly    ondansetron injection 4 mg Q6H PRN    polyethylene glycol packet 17 g Daily    psyllium husk (aspartame) 3.4 gram packet 1 packet Daily    senna-docusate 8.6-50 mg per tablet 2 tablet Daily    sodium chloride 0.9% flush 10 mL PRN    traMADoL tablet 50 mg Q8H PRN    trazodone split tablet 25 mg QHS    venlafaxine tablet 37.5 mg Daily    [START ON 1/18/2024] vitamin D 1000 units tablet 1,000 Units Daily    zolpidem tablet 5 mg Nightly PRN       Objective:     Vital Signs (Most Recent):  Temp: 97.5 °F (36.4 °C) (01/17/24 1146)  Pulse: 88 (01/17/24 1146)  Resp: 18 (01/17/24 1146)  BP: (!) 80/0 (01/17/24 1146)  SpO2: 98 % (01/17/24 1146) Vital Signs (24h Range):  Temp:  [97.5 °F (36.4 °C)-98.2 °F (36.8 °C)] 97.5 °F (36.4 °C)  Pulse:  [] 88  Resp:  [17-18] 18  SpO2:   [87 %-100 %] 98 %  BP: (78-99)/(0-77) 80/0     Weight: 73.5 kg (162 lb 0.6 oz) (01/17/24 0700)  Body mass index is 21.98 kg/m².  Body surface area is 1.93 meters squared.    I/O last 3 completed shifts:  In: 1558 [P.O.:1558]  Out: 525 [Urine:525]     Physical Exam  Vitals and nursing note reviewed.   Constitutional:       Comments: Cachectic, temporal wasting   HENT:      Head: Normocephalic and atraumatic.   Eyes:      Conjunctiva/sclera: Conjunctivae normal.      Pupils: Pupils are equal, round, and reactive to light.   Neck:      Comments: Do not appreciate elevated JVP  Cardiovascular:      Rate and Rhythm: Normal rate and regular rhythm.      Comments: LVAD  Pulmonary:      Effort: Pulmonary effort is normal.   Abdominal:      General: Bowel sounds are normal.      Palpations: Abdomen is soft.   Musculoskeletal:         General: No swelling. Normal range of motion.      Cervical back: Normal range of motion and neck supple.   Skin:     General: Skin is warm and dry.      Capillary Refill: Capillary refill takes 2 to 3 seconds.   Neurological:      General: No focal deficit present.      Mental Status: He is alert and oriented to person, place, and time.          Significant Labs:  CBC:   Recent Labs   Lab 01/17/24  0541   WBC 6.36   RBC 2.42*   HGB 6.9*   HCT 22.7*      MCV 94   MCH 28.5   MCHC 30.4*     CMP:   Recent Labs   Lab 01/17/24  0541   GLU 78   CALCIUM 8.7   ALBUMIN 2.0*   PROT 7.0      K 5.0   CO2 20*      BUN 81*   CREATININE 4.3*   ALKPHOS 139*   ALT 38   AST 36   BILITOT 0.2     All labs within the past 24 hours have been reviewed.

## 2024-01-17 NOTE — PROGRESS NOTES
01/17/24 1511   Vital Signs   Pulse 84   BP (!) 86/0   BP Location Left arm   BP Method Doppler   Patient Position Lying     Pt returned from X-ray via wheelchair with [facility] transport. Upon return patient stated he had become weak while doing the X-ray and fell/lowered himself to the floor. Pt denies lightheadedness/dizziness at time of event. Pt denies hitting his head. LVAD numbers WNL, no alarms. DENIZ Abdullahi notified.

## 2024-01-17 NOTE — PROGRESS NOTES
[facility] - Cardiology Stepdown  Adult Nutrition  Progress Note    SUMMARY       Recommendations  1. Diet advancement per SLP- continue M+M diet w/ honey thick liquids- add renal restrictions   2. Continue Boost GC w/ thickener for optimization of protein and calorie intake- please note- Boost plus is on backorder    3. RD following    Goals: Meet % of EEN/EPN by RD f/u date  Nutrition Goal Status: progressing towards goal   Communication of RD Recs:  (POC)    Assessment and Plan    Endocrine  Moderate malnutrition  Malnutrition Type:  Context: acute illness or injury  Level: moderate    Related to (etiology):   Inadequate nutrition intake    Signs and Symptoms (as evidenced by):   Nutritional intake <75% x 7days, observed orbital wasting and trace edema present.     Malnutrition Characteristic Summary:  Energy Intake (Malnutrition): less than 75% for greater than 7 days  Fluid Accumulation (Malnutrition):  (Trace edema)    Interventions/Recommendations (treatment strategy):  Collaboration of nutritional care with other providers.  EN    Nutrition Diagnosis Status:   Continues     Malnutrition Assessment  Malnutrition Context: acute illness or injury  Malnutrition Level: moderate    Energy Intake (Malnutrition): less than 75% for greater than 7 days  Fluid Accumulation (Malnutrition):  (Trace edema)   Orbital Region (Subcutaneous Fat Loss): mild depletion   Yazdanism Region (Muscle Loss): moderate depletion                 Reason for Assessment    Reason For Assessment: RD follow-up  Diagnosis: cardiac disease (CHF/ s/p LVAD on 12/1)  Relevant Medical History: CAD, DMT2, CHF, PAF  Interdisciplinary Rounds: attended  General Information Comments: Spoke w/ pt at bedside, reports that he is consuming outside food that pt's caregiver brings- reports consuming nachos yesterday. Pt is attempting to drink 1-2 Boosts per day. Discussed importance of po intake + ONS supplements for optimization of nutrition status at this  time. Malnutrition dx continues. LBM noted-1/17/24  Nutrition Discharge Planning: Vitamin K education provided 1/11. Discussed remaining consistent w/ foods that contain Vitamin K- green leafy vegetables. Handouts wer provided to pt/pt caregiver- no additional questions noted at this time.    Nutrition Risk Screen    Nutrition Risk Screen: difficulty chewing/swallowing    Nutrition/Diet History    Spiritual, Cultural Beliefs, Zoroastrian Practices, Values that Affect Care: no  Food Allergies: NKFA    Anthropometrics    Temp: 97.5 °F (36.4 °C)  Height Method: Stated  Height: 6' (182.9 cm)  Height (inches): 72 in  Weight Method: Standard Scale  Weight: 73.5 kg (162 lb 0.6 oz)  Weight (lb): 162.04 lb  Ideal Body Weight (IBW), Male: 178 lb  % Ideal Body Weight, Male (lb): 101.69 %  BMI (Calculated): 22  BMI Grade: 18.5-24.9 - normal     Lab/Procedures/Meds    Pertinent Labs Reviewed: reviewed  Pertinent Labs Comments: Hgb: 6.5, Hct: 19.8, Na: 134, BUN: 74, Creatinine: 4.4, eGFR: 14.5, Glucose: 162, Calcium: 8.5, Phosphorus: 1.9, Albumin: 1.9  Pertinent Medications Reviewed: reviewed  Pertinent Medications Comments: Famotidine, gabapentin, insulin, polyethylene glycol, psyllium husk, warfarin, senna-doc    Estimated/Assessed Needs    Weight Used For Calorie Calculations: 81 kg (178 lb 9.2 oz) (IBW d/t edema present)  Energy Calorie Requirements (kcal): 2025- 2430 kcal  Energy Need Method: Kcal/kg (25-30 kcal/ kg of IBW)  Protein Requirements: 97- 122g (1.2-1.5g/kg of IBW)  Weight Used For Protein Calculations: 81 kg (178 lb 9.2 oz) (IBW d/t edema present.)  Fluid Requirements (mL): 1 ml/kcal or per MD   RDA Method (mL): 2025       Nutrition Prescription Ordered    Current Diet Order: Minced and moist  Nutrition Order Comments: Low potassium, honey thick liquids     Evaluation of Received Nutrient/Fluid Intake    I/O: + 2.9 L since 12/21  Energy Calories Required: progressing towards goal   Protein Required:progressing  towards goal   Fluid Required:  (As per MD)  Total Fluid Intake (mL/kg): 1 ml/kcal or per MD  Tolerance: tolerating  % Intake of Estimated Energy Needs: %  % Meal Intake: %    Nutrition Risk    Level of Risk/Frequency of Follow-up:  (Follow-up- 2x/week)     Monitor and Evaluation    Food and Nutrient Intake: energy intake, food and beverage intake, enteral nutrition intake, parenteral nutrition intake  Food and Nutrient Adminstration: diet order, enteral and parenteral nutrition administration  Knowledge/Beliefs/Attitudes: food and nutrition knowledge/skill, beliefs and attitudes  Physical Activity and Function: nutrition-related ADLs and IADLs, factors affecting access to physical activity  Anthropometric Measurements: height/length, weight, weight change, body mass index, growth pattern indices/percentile ranks  Biochemical Data, Medical Tests and Procedures: electrolyte and renal panel, gastrointestinal profile, glucose/endocrine profile, inflammatory profile, lipid profile  Nutrition-Focused Physical Findings: overall appearance, extremities, muscles and bones, head and eyes, skin     Nutrition Follow-Up    RD Follow-up?: Yes

## 2024-01-17 NOTE — PROGRESS NOTES
01/17/2024  Alondra Dykes    Current provider:  Sandhya Price MD    Device interrogation:      1/17/2024    11:50 AM 1/17/2024     8:24 AM 1/17/2024     4:30 AM 1/16/2024    11:21 PM 1/16/2024     7:59 PM 1/16/2024     4:21 PM 1/16/2024    11:15 AM   TXP LVAD INTERROGATIONS   Type HeartMate3 HeartMate3 HeartMate3 HeartMate3 HeartMate3 HeartMate3 HeartMate3   Flow 3.8 3.6 3.8 3.8 3.6 4 4   Speed 4900 4900 4900 4900 4900 4900 4900   PI 5.4 6 5.3. 5.3 6.9 3.8 4.9   Power (Carrington) 3.3 3.3 3.3 3.3 3.2 3.4 3.3   LSL 4500 4500  4500 4500 4500 4500   Pulsatility Intermittent pulse Intermittent pulse  Intermittent pulse Intermittent pulse            Rounded on [facility] to ensure all mechanical assist device settings (IABP or VAD) were appropriate and all parameters were within limits.  I was able to ensure all back up equipment was present, the staff had no issues, and the Perfusion Department daily rounding was complete.      For implantable VADs: Interrogation of Ventricular assist device was performed with analysis of device parameters and review of device function. I have personally reviewed the interrogation findings and agree with findings as stated.     In emergency, the nursing units have been notified to contact the perfusion department either by:  Calling [phone] from 630am to 4pm Mon thru Fri, utilizing the On-Call Finder functionality of Epic and searching for Perfusion, or by contacting the hospital  from 4pm to 630am and on weekends and asking to speak with the perfusionist on call.    1:42 PM

## 2024-01-17 NOTE — PT/OT/SLP PROGRESS
Occupational Therapy      Patient Name:  Radha Abbott   MRN:  [id number]    Patient not seen today secondary to nsg care for sterile dressing change on 1st attempt and pt CIPRIANO for x-ray on 2nd attempt. Will follow-up as scheduled.    1/17/2024

## 2024-01-17 NOTE — PROGRESS NOTES
Update:  SW met with pt and his wife.  Pt reported he was doing well at this time, but mentioned a fall while having x-ray off the unit. Notified nursing staff of pt's report.     SW updated pt and his wife on rehab consult.  Pt reported he was happy about this.   Pt's wife inquired about ST to reassess swallow.  SW will inquire with team.     SW will follow.

## 2024-01-17 NOTE — ASSESSMENT & PLAN NOTE
-HeartMate 3 Implanted  12/1/2023  as DT  -HTS Primary  -Implanted by Dr. Washington  -Continue Coumadin, dosing by PharmD.  Goal INR 2.0-3.0 . Subtherapeutic today.   -Antiplatelets Not on ASA  -LDH is stable overall today. Will continue to monitor daily.  -Weaned off midodrine.  -Speed set at  4900   rpm, LSL 4500 rpm   -Interrogation notable for no events  -ECHO 1/2/24 mild TR, LVEDD 6.25 with HM3 set to 4900 rpm   -Not listed for OHTx, declined for OHTX due to comorbidities   -PT/OT/Speech. Recommending rehab but HD + LVAD may be barrier      Procedure: Device Interrogation Including analysis of device parameters  Current Settings: Ventricular Assist Device  Review of device function is stable/unstable stable        1/17/2024     8:24 AM 1/17/2024     4:30 AM 1/16/2024    11:21 PM 1/16/2024     7:59 PM 1/16/2024     4:21 PM 1/16/2024    11:15 AM 1/16/2024     8:17 AM   TXP LVAD INTERROGATIONS   Type HeartMate3 HeartMate3 HeartMate3 HeartMate3 HeartMate3 HeartMate3 HeartMate3   Flow 3.6 3.8 3.8 3.6 4 4 3.6   Speed 4900 4900 4900 4900 4900 4900 4900   PI 6 5.3. 5.3 6.9 3.8 4.9 6.3   Power (Carrington) 3.3 3.3 3.3 3.2 3.4 3.3 3.4   LSL 4500  4500 4500 4500 4500 4500   Pulsatility Intermittent pulse  Intermittent pulse Intermittent pulse

## 2024-01-17 NOTE — PLAN OF CARE
Recommendations  1. Diet advancement per SLP- continue M+M diet w/ honey thick liquids- add renal restrictions   2. Continue Boost GC w/ thickener for optimization of protein and calorie intake- please note- Boost plus is on backorder    3. RD following     Goals: Meet % of EEN/EPN by RD f/u date  Nutrition Goal Status: progressing towards goal   Communication of RD Recs:  (POC)

## 2024-01-17 NOTE — CARE UPDATE
Care Update:     No acute events overnight. Patient in room 312/312 A. Blood glucose stable on current inpatient regimen. 44 Days Post-Op    Steroid use- None.   Renal function-   Lab Results   Component Value Date    CREATININE 4.3 (H) 01/17/2024        Diet Minced & Moist (IDDSI Level 5) Low Potassium; Honey Thick (Moderately Thick)     POCT Glucose   Date Value Ref Range Status   01/17/2024 76 70 - 110 mg/dL Final   01/16/2024 124 (H) 70 - 110 mg/dL Final   01/16/2024 108 70 - 110 mg/dL Final   01/16/2024 151 (H) 70 - 110 mg/dL Final   01/16/2024 101 70 - 110 mg/dL Final   01/15/2024 149 (H) 70 - 110 mg/dL Final   01/15/2024 159 (H) 70 - 110 mg/dL Final   01/15/2024 155 (H) 70 - 110 mg/dL Final   01/15/2024 169 (H) 70 - 110 mg/dL Final   01/14/2024 207 (H) 70 - 110 mg/dL Final   01/14/2024 206 (H) 70 - 110 mg/dL Final   01/14/2024 193 (H) 70 - 110 mg/dL Final     Lab Results   Component Value Date    HGBA1C 5.1 01/13/2024         Home Medications: Metformin (off since October)     Pt BG trending below goal without insulin given, possibly due to poor PO intake. Will continue to monitor BG trends as appetite returns to baseline to fully determine insulin requirements.     Endocrine  Type 2 diabetes mellitus without complication, without long-term current use of insulin  BG goal 140-180.      - LDC (150/50) prn AC/HS   - POCT Glucose every AC/HS  - Hypoglycemia protocol in place      ** Please notify Endocrine for any change and/or advance in diet**  ** Please call Endocrine for any BG related issues **     Discharge Planning:   TBD. Please notify endocrinology prior to discharge.

## 2024-01-17 NOTE — PROGRESS NOTES
[facility] - Cardiology Stepdown  Heart Transplant  Progress Note    Patient Name: Radha Abbott  MRN: [id number]  Admission Date: 11/9/2023  Hospital Length of Stay: 69 days  Attending Physician: Sandhya Price MD  Primary Care Provider: Vasu Kong MD  Principal Problem:LVAD (left ventricular assist device) present    Subjective:   Interval History: NAEON. No acute complaints. Last HD session 1/10. Kidney function stable and appears euvolemic.     Continuous Infusions:   sodium chloride 0.9%       Scheduled Meds:   acetaminophen  1,000 mg Per NG tube TID    alteplase  2 mg Intra-Catheter Once    amiodarone  400 mg Oral Daily    balsam peru-castor oiL   Topical (Top) BID    epoetin zohaib (PROCRIT) injection  50 Units/kg Subcutaneous Every Tues, Thurs, Sat    famotidine  20 mg Oral Daily    fluticasone propionate  2 spray Each Nostril Daily    gabapentin  100 mg Oral BID    mirtazapine  7.5 mg Oral Nightly    polyethylene glycol  17 g Oral Daily    psyllium husk (aspartame)  1 packet Oral Daily    senna-docusate 8.6-50 mg  2 tablet Oral Daily    traZODone  25 mg Oral QHS    venlafaxine  37.5 mg Oral Daily    [START ON 1/18/2024] vitamin D  1,000 Units Oral Daily     PRN Meds:0.9%  NaCl infusion (for blood administration), bisacodyL, dextrose 10%, dextrose 10%, dextrose 10%, dextrose 10%, glucagon (human recombinant), glucose, glucose, heparin (porcine), hydrALAZINE, insulin aspart U-100, ondansetron, Flushing PICC/Midline Protocol **AND** [DISCONTINUED] sodium chloride 0.9% **AND** sodium chloride 0.9%, traMADoL, zolpidem    Review of patient's allergies indicates:  No Known Allergies  Objective:     Vital Signs (Most Recent):  Temp: 98.2 °F (36.8 °C) (01/17/24 0839)  Pulse: 94 (01/17/24 1113)  Resp: 18 (01/17/24 0839)  BP: (!) 86/0 (01/17/24 0945)  SpO2: 99 % (01/17/24 0839) Vital Signs (24h Range):  Temp:  [97.7 °F (36.5 °C)-98.2 °F (36.8 °C)] 98.2 °F (36.8 °C)  Pulse:  [] 94  Resp:  [17-20] 18  SpO2:   [87 %-100 %] 99 %  BP: (78-99)/(0-77) 86/0     Patient Vitals for the past 72 hrs (Last 3 readings):   Weight   01/17/24 0700 73.5 kg (162 lb 0.6 oz)   01/16/24 0838 70.9 kg (156 lb 4.9 oz)   01/15/24 0739 72 kg (158 lb 11.7 oz)       Body mass index is 21.98 kg/m².      Intake/Output Summary (Last 24 hours) at 1/17/2024 1135  Last data filed at 1/17/2024 0508  Gross per 24 hour   Intake 480 ml   Output 175 ml   Net 305 ml         Hemodynamic Parameters:       Telemetry: SR       Physical Exam  Constitutional:       Comments: Cachectic, temporal wasting   HENT:      Head: Normocephalic and atraumatic.   Eyes:      Conjunctiva/sclera: Conjunctivae normal.      Pupils: Pupils are equal, round, and reactive to light.   Neck:      Comments: Do not appreciate elevated JVP  Cardiovascular:      Rate and Rhythm: Normal rate and regular rhythm.      Comments: Smooth VAD hum  Pulmonary:      Effort: Pulmonary effort is normal.      Breath sounds: Normal breath sounds.   Abdominal:      General: Bowel sounds are normal.      Palpations: Abdomen is soft.   Musculoskeletal:         General: No swelling. Normal range of motion.      Cervical back: Normal range of motion and neck supple.   Skin:     General: Skin is warm and dry.      Capillary Refill: Capillary refill takes 2 to 3 seconds.   Neurological:      General: No focal deficit present.      Mental Status: He is alert and oriented to person, place, and time.            Significant Labs:  CBC:  Recent Labs   Lab 01/15/24  0315 01/16/24  0244 01/17/24  0541   WBC 6.81 6.15 6.36   RBC 2.49* 2.41* 2.42*   HGB 7.0* 6.9* 6.9*   HCT 22.8* 22.6* 22.7*    374 411   MCV 92 94 94   MCH 28.1 28.6 28.5   MCHC 30.7* 30.5* 30.4*       BNP:  Recent Labs   Lab 01/12/24  0535 01/15/24  0315 01/17/24  0541   * 748* 906*       CMP:  Recent Labs   Lab 01/15/24  0314 01/16/24  0244 01/17/24  0541   GLU 93 89 78   CALCIUM 8.8 8.6* 8.7   ALBUMIN 2.0* 2.0* 2.0*   PROT 7.2 7.0 7.0     137 136   K 4.1 4.9 5.0   CO2 24 22* 20*    105 106   * 90* 81*   CREATININE 4.8* 4.6* 4.3*   ALKPHOS 137* 138* 139*   ALT 29 37 38   AST 33 43* 36   BILITOT 0.2 0.2 0.2        Coagulation:   Recent Labs   Lab 01/15/24  0315 01/16/24  0244 01/17/24  0541   INR 2.1* 2.7* 3.2*       LDH:  Recent Labs   Lab 01/15/24  0315 01/16/24  0244 01/17/24  0541   * 308* 304*       Microbiology:  Microbiology Results (last 7 days)       ** No results found for the last 168 hours. **            I have reviewed all pertinent labs within the past 24 hours.    Estimated Creatinine Clearance: 18.8 mL/min (A) (based on SCr of 4.3 mg/dL (H)).    Diagnostic Results:  I have reviewed and interpreted all pertinent imaging results/findings within the past 24 hours.  Assessment and Plan:     63 year old male with hx of CAD s/p 3v CABG (unclear anatomy, 2009), ICM with a recent EF of 15-20% s/p ICD (medtronik 2009), DM2 (a1c 7.7), HTN, HLD, Vfib on amio who presents to the ED with CC of SOB     Pt was recently admitted to [facility] as a transfer.  He was started on a Lasix gtt and did well.  Started on gDMT and discharged home on  5 with plans to follow up in HTS clinic for ongoing transplant evaluation at another facility.  He says that about a few days ago he started to notice LE swelling.  This turned into Nelson and orthopnea.  He says he can't walk to the bathroom without getting SOB.  He also complains of weight gain.  He takes torsemide 20mg BID at home and was told to trial additional Lasix which he did without any improvement.  He was rx metolazone but has not been filled.  He came to the ED     In the ED he was AF with stable VS on RA.  CBC showing chronic anemia.  CMP notable for acute on chronic CKD with baseline around 2.1 and Cr now 2.6.  BNP elevated.  Lactate neg.  CXR showing pulmonary edema.  I evaluated the pt at the bedside.  Bedside TTE showing CVP >15.  He was subsequently admitted to the CCU for  diuresis.     He was continued on his home  and was started on a lasix gtt, which he responded well to overnight (net -1700cc. He feels much better this morning as well. Our transplant coordinators have been working on insurance approval and he is now being transferred to [facility] service for transplant evaluation.     * LVAD (left ventricular assist device) present  -HeartMate 3 Implanted  12/1/2023  as DT  -HTS Primary  -Implanted by Dr. Washington  -Continue Coumadin, dosing by PharmD.  Goal INR 2.0-3.0 . Subtherapeutic today.   -Antiplatelets Not on ASA  -LDH is stable overall today. Will continue to monitor daily.  -Weaned off midodrine.  -Speed set at  4900   rpm, LSL 4500 rpm   -Interrogation notable for no events  -ECHO 1/2/24 mild TR, LVEDD 6.25 with HM3 set to 4900 rpm   -Not listed for OHTx, declined for OHTX due to comorbidities   -PT/OT/Speech. Recommending rehab but HD + LVAD may be barrier      Procedure: Device Interrogation Including analysis of device parameters  Current Settings: Ventricular Assist Device  Review of device function is stable/unstable stable        1/17/2024     8:24 AM 1/17/2024     4:30 AM 1/16/2024    11:21 PM 1/16/2024     7:59 PM 1/16/2024     4:21 PM 1/16/2024    11:15 AM 1/16/2024     8:17 AM   TXP LVAD INTERROGATIONS   Type HeartMate3 HeartMate3 HeartMate3 HeartMate3 HeartMate3 HeartMate3 HeartMate3   Flow 3.6 3.8 3.8 3.6 4 4 3.6   Speed 4900 4900 4900 4900 4900 4900 4900   PI 6 5.3. 5.3 6.9 3.8 4.9 6.3   Power ([place]) 3.3 3.3 3.3 3.2 3.4 3.3 3.4   LSL 4500  4500 4500 4500 4500 4500   Pulsatility Intermittent pulse  Intermittent pulse Intermittent pulse            Right parotid mass  - 2.2cm R. Parotid mass noted.   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 Can follow up as outpatient w/ ENT for FNA. No further interventions needed as inpatient.     Lymphadenopathy  +ill contacts  Respiratory panel negative  Strep test negative.   Throat culture showing pseudomonas.  As per ID , will treat w/ 5 day course of zosyn (therapy completed 1/10)    Unilateral complete vocal fold paralysis  -Appreciate ENT's help  -S/P augmentation of paralyzed left vocal cord 12/18    Dysphonia  -Speech following closely  -Appreciate ENT's help. S/P augmentation of paralyzed left vocal cord 12/18    Moderate malnutrition  -RD and SLP following  -Tolerating tube feeds. Total daily caloric intake increased to 2160  -Failed MBBS 12/20, continue TF.  -Speech following, appreciate their continued recommendations     Depressed mood  -Psychiatry consulted for depressed mood, SI when left alone  -Recommend sitter when alone (possibly with transition to stepdown).  -Psych reconsulted 1/2, agree with venlafaxine and trazadone    IVÁN (acute kidney injury)  -Nephrology following      Acute on chronic combined systolic and diastolic CHF, NYHA class 4  Pt with known ICM with an EF of 25% on home  presented with decompensated HF.    -S/P LVAD    PAF (paroxysmal atrial fibrillation)  Known hx of pAF. In sinus rhythm on admission, but 1 run of AF RVR overnight. He spontaneously converted.  - Continue home amiodarone 400mg qd  - Continue AC, on hold with supratherapeutic INR       Acute renal failure with acute tubular necrosis superimposed on stage 3b chronic kidney disease  IVÁN on CKD2. Baseline Cr of 1.7-2.0.   - Hold ARNi  -Trend BMPs daily   -Nephrology following   -SLED/SCUF for volume removal began on 12/1. He is anuric  -Did not respond to diuretic challenge with 240 mg IV Lasix, 1000 mg Diuril, and 500 mg IV Diamox done 12/14  -Diuretic challenge 1/8 with 160 mg IVP Lasix,   -total  cc/24 hours   -Now on HD TThSat; but last HD was on 1/10  -Tunneled trialysis line placed 12/22 by IR    Type 2 diabetes mellitus without complication, without long-term current use of insulin  -A1c 7.6, not insulin dependent  - Endocrine following, appreciate assistance with blood glucose management    CAD (coronary  artery disease)  -S/p 3vCABG in 2009  -Lipitor on hold 2/2 mild transaminitis  -Not on ASA post VAD    Ventricular tachycardia  Hx VT s/p ICD placement (medtronic 2009)  - Continue home amio 400mg qd        Jimy An PA-C  Heart Transplant  [facility] - Cardiology Stepdown

## 2024-01-17 NOTE — PROGRESS NOTES
[facility]  Psychology  Consult Note      PROGRESS NOTE - INTERVENTION  Patient Name: Radha Abbott  MRN: [id number]  Date: 01/17/2024  Admission Date: 11/9/2023   Length of Stay: Hospital Day: 70   Attending Physician: Sandhya Price MD    HISTORY OF PRESENTING ILLNESS: 64 yo male with LVAD for DT on 12/1, post op complicated by diffuse coagulopathy and RV dysfunction with subsequent chest closure on 12/4. Hospital course complicated by respiratory failure s/p intubation now extubated.    BRIEF ASSESSMENT  Mood: Denied low mood, anhedonia, suicidal ideation. Noted regular periods of irritability.  Anxiety: Mild anxiety related to worries about health and recovery. Clarified that anxiety has decreased significantly over the past 1-2 weeks.  Pain: Rated current pain as 4/10. Denied concerns regarding pain management.  Sleep: Denied concerns.  Appetite: Denied concerns.     INTERVENTION  Intervention Type: Motivational Interviewing  Session Content: Engaged patient in motivational interviewing intervention focused participation in PT and rehab in general. Elicited description of health values and recovery goals. Reviewed recover progress made thus far. Patient described good commitment to participating in PT.    MENTAL STATUS EXAM & OBSERVATIONS  Appearance:  Good grooming and hygiene. Dressed in pajamas. Appeared stated age.      Orientation: Oriented x 4.   Speech: Soft volume. Communicated mostly via yes/no head nods.   Thought Processes: Logical and goal-oriented.      Thought Content: Normal. No suicidal or homicidal ideation. No indications of obsessions or delusions.   Mood: Anxious.   Affect: Full range, congruent with mood and session content..   Attention & Concentration: Intact. No deficits noted.   Insight: Good   Judgment: Good.   Behavioral Observations: Cooperative and engaged. Seated in chair. Good eye contact. No signs of psychomotor agitation or retardation.     DIAGNOSTIC  IMPRESSION & PLAN  Patient Active Problem List   Diagnosis    Ventricular tachycardia    CAD (coronary artery disease)    HFrEF (heart failure with reduced ejection fraction)    Type 2 diabetes mellitus without complication, without long-term current use of insulin    Hypokalemia    Acute renal failure with acute tubular necrosis superimposed on stage 3b chronic kidney disease    PAF (paroxysmal atrial fibrillation)    Ventricular fibrillation    Acute on chronic combined systolic and diastolic CHF, NYHA class 4    Defibrillator discharge    Receiving inotropic medication    Pre-transplant evaluation for heart transplant    Palliative care encounter    Advance care planning    LVAD (left ventricular assist device) present    Abnormal CXR    Acute encephalopathy    IVÁN (acute kidney injury)    Congestive heart failure    Septic shock    Depressed mood    Moderate malnutrition    Dysphonia    Unilateral complete vocal fold paralysis    Oliguria    Shortness of breath    Acute renal failure with tubular necrosis    Acute renal failure on dialysis    Acute kidney injury superimposed on chronic kidney disease    Adjustment disorder with depressed mood    Deep tissue injury    Blood blister    Lymphadenopathy    At high risk for skin breakdown    Right parotid mass       Impression: 64 yo male with LVAD for DT on 12/1, post op complicated by diffuse coagulopathy and RV dysfunction with subsequent chest closure on 12/4. Hospital course complicated by respiratory failure s/p intubation now extubated. Psychiatry following. Patient initially endorsed significant low mood and anxiety related to medical stress but anxiety and low mood have improved significantly over the past 1-2 weeks. Social support is good. Overall, coping skills are somewhat limited.    Plan: Psychology will continue to follow.     Recommendations  For RN: Please ask patient for preference regarding scheduling time to sit up in a chair. Increasing sense of  control may work to improve motivation.       Length of Service: 17 minutes    Blue Hdz, Ph.D.  Dept. of Psychiatry  [facility]

## 2024-01-17 NOTE — CONSULTS
Inpatient consult to Physical Medicine Rehab  Consult performed by: Layne Ramirez NP  Consult ordered by: Sandhya Price MD  Reason for consult: Assess rehab needs      Reviewed patient history and current admission.  Rehab team following.  Full consult to follow.    CAROLINA Griffiths, FNP-C  Physical Medicine & Rehabilitation   01/17/2024

## 2024-01-17 NOTE — PROGRESS NOTES
[facility] - Cardiology Stepdown  Nephrology  Progress Note    Patient Name: Radha Abbott  MRN: [id number]  Admission Date: 11/9/2023  Hospital Length of Stay: 69 days  Attending Provider: Sandhya Price MD   Primary Care Physician: Vasu Kong MD  Principal Problem:LVAD (left ventricular assist device) present    Subjective:     Interval History: Scr 4.3 from 4.6. K 5.0. 24 . Appears euvolemic on exam. Oxygenating well on RA.     Review of patient's allergies indicates:  No Known Allergies  Current Facility-Administered Medications   Medication Frequency    0.9%  NaCl infusion (for blood administration) Q24H PRN    0.9%  NaCl infusion Continuous    acetaminophen tablet 1,000 mg TID    alteplase injection 2 mg Once    amiodarone tablet 400 mg Daily    balsam peru-castor oiL Oint BID    bisacodyL suppository 10 mg Daily PRN    dextrose 10% bolus 125 mL 125 mL PRN    dextrose 10% bolus 125 mL 125 mL PRN    dextrose 10% bolus 250 mL 250 mL PRN    dextrose 10% bolus 250 mL 250 mL PRN    epoetin zohaib injection 8,100 Units Every Tues, Thurs, Sat    famotidine tablet 20 mg Daily    fluticasone propionate 50 mcg/actuation nasal spray 100 mcg Daily    gabapentin capsule 100 mg BID    glucagon (human recombinant) injection 1 mg PRN    glucose chewable tablet 16 g PRN    glucose chewable tablet 24 g PRN    heparin (porcine) injection 1,000 Units PRN    hydrALAZINE injection 10 mg Q6H PRN    insulin aspart U-100 pen 0-5 Units QID (AC + HS) PRN    mirtazapine tablet 7.5 mg Nightly    ondansetron injection 4 mg Q6H PRN    polyethylene glycol packet 17 g Daily    psyllium husk (aspartame) 3.4 gram packet 1 packet Daily    senna-docusate 8.6-50 mg per tablet 2 tablet Daily    sodium chloride 0.9% flush 10 mL PRN    traMADoL tablet 50 mg Q8H PRN    trazodone split tablet 25 mg QHS    venlafaxine tablet 37.5 mg Daily    [START ON 1/18/2024] vitamin D 1000 units tablet 1,000 Units Daily    zolpidem tablet 5 mg Nightly  PRN       Objective:     Vital Signs (Most Recent):  Temp: 97.5 °F (36.4 °C) (01/17/24 1146)  Pulse: 88 (01/17/24 1146)  Resp: 18 (01/17/24 1146)  BP: (!) 80/0 (01/17/24 1146)  SpO2: 98 % (01/17/24 1146) Vital Signs (24h Range):  Temp:  [97.5 °F (36.4 °C)-98.2 °F (36.8 °C)] 97.5 °F (36.4 °C)  Pulse:  [] 88  Resp:  [17-18] 18  SpO2:  [87 %-100 %] 98 %  BP: (78-99)/(0-77) 80/0     Weight: 73.5 kg (162 lb 0.6 oz) (01/17/24 0700)  Body mass index is 21.98 kg/m².  Body surface area is 1.93 meters squared.    I/O last 3 completed shifts:  In: 1558 [P.O.:1558]  Out: 525 [Urine:525]     Physical Exam  Vitals and nursing note reviewed.   Constitutional:       Comments: Cachectic, temporal wasting   HENT:      Head: Normocephalic and atraumatic.   Eyes:      Conjunctiva/sclera: Conjunctivae normal.      Pupils: Pupils are equal, round, and reactive to light.   Neck:      Comments: Do not appreciate elevated JVP  Cardiovascular:      Rate and Rhythm: Normal rate and regular rhythm.      Comments: LVAD  Pulmonary:      Effort: Pulmonary effort is normal.   Abdominal:      General: Bowel sounds are normal.      Palpations: Abdomen is soft.   Musculoskeletal:         General: No swelling. Normal range of motion.      Cervical back: Normal range of motion and neck supple.   Skin:     General: Skin is warm and dry.      Capillary Refill: Capillary refill takes 2 to 3 seconds.   Neurological:      General: No focal deficit present.      Mental Status: He is alert and oriented to person, place, and time.          Significant Labs:  CBC:   Recent Labs   Lab 01/17/24  0541   WBC 6.36   RBC 2.42*   HGB 6.9*   HCT 22.7*      MCV 94   MCH 28.5   MCHC 30.4*     CMP:   Recent Labs   Lab 01/17/24  0541   GLU 78   CALCIUM 8.7   ALBUMIN 2.0*   PROT 7.0      K 5.0   CO2 20*      BUN 81*   CREATININE 4.3*   ALKPHOS 139*   ALT 38   AST 36   BILITOT 0.2     All labs within the past 24 hours have been reviewed.        Assessment/Plan:     Cardiac/Vascular  * LVAD (left ventricular assist device) present  -management per primary     Renal/  Acute renal failure with acute tubular necrosis superimposed on stage 3b chronic kidney disease  - suspect acute tubular injury secondary to hypotension/cardiogenic shock likely compounded by intra-arterial contrast and anemia with urinary sediment with granular casts    -No acute indication for RRT   -Cystatin C (ordered)   -strict I/O's  -Continue to monitor for signs if renal recovery   -renal diet/tube feeds when not NPO, volume restriction per primary team  -renally all dose medications to eGFR  -avoid nephrotoxic agents wean feasible (i.e. NSAIDs, intra-arterial contrast, supra-therapeutic vancomycin levels, etc.)          Thank you for your consult. I will follow-up with patient. Please contact us if you have any additional questions.    Yolanda Cai DNP  Nephrology  [facility] - Cardiology Stepdown

## 2024-01-17 NOTE — PROGRESS NOTES
01/17/24 0839   Vital Signs   Temp 98.2 °F (36.8 °C)   Temp Source Oral   Pulse 90   Heart Rate Source Monitor   Resp 18   SpO2 99 %   Device (Oxygen Therapy) room air   BP (!) 96/0  (unable to obtain automatic BP)   BP Location Left arm   BP Method Doppler   Patient Position Lying     Doppler 96/0, unable to obtain automatic BP on either arm. Pt asymptomatic, LVAD numbers WNL, no alarms. DENIZ Abdullahi   notified.

## 2024-01-17 NOTE — SUBJECTIVE & OBJECTIVE
Interval History: NAEON. No acute complaints. Last HD session 1/10. Kidney function stable and appears euvolemic.     Continuous Infusions:   sodium chloride 0.9%       Scheduled Meds:   acetaminophen  1,000 mg Per NG tube TID    alteplase  2 mg Intra-Catheter Once    amiodarone  400 mg Oral Daily    balsam peru-castor oiL   Topical (Top) BID    epoetin zohaib (PROCRIT) injection  50 Units/kg Subcutaneous Every Tues, Thurs, Sat    famotidine  20 mg Oral Daily    fluticasone propionate  2 spray Each Nostril Daily    gabapentin  100 mg Oral BID    mirtazapine  7.5 mg Oral Nightly    polyethylene glycol  17 g Oral Daily    psyllium husk (aspartame)  1 packet Oral Daily    senna-docusate 8.6-50 mg  2 tablet Oral Daily    traZODone  25 mg Oral QHS    venlafaxine  37.5 mg Oral Daily    [START ON 1/18/2024] vitamin D  1,000 Units Oral Daily     PRN Meds:0.9%  NaCl infusion (for blood administration), bisacodyL, dextrose 10%, dextrose 10%, dextrose 10%, dextrose 10%, glucagon (human recombinant), glucose, glucose, heparin (porcine), hydrALAZINE, insulin aspart U-100, ondansetron, Flushing PICC/Midline Protocol **AND** [DISCONTINUED] sodium chloride 0.9% **AND** sodium chloride 0.9%, traMADoL, zolpidem    Review of patient's allergies indicates:  No Known Allergies  Objective:     Vital Signs (Most Recent):  Temp: 98.2 °F (36.8 °C) (01/17/24 0839)  Pulse: 94 (01/17/24 1113)  Resp: 18 (01/17/24 0839)  BP: (!) 86/0 (01/17/24 0945)  SpO2: 99 % (01/17/24 0839) Vital Signs (24h Range):  Temp:  [97.7 °F (36.5 °C)-98.2 °F (36.8 °C)] 98.2 °F (36.8 °C)  Pulse:  [] 94  Resp:  [17-20] 18  SpO2:  [87 %-100 %] 99 %  BP: (78-99)/(0-77) 86/0     Patient Vitals for the past 72 hrs (Last 3 readings):   Weight   01/17/24 0700 73.5 kg (162 lb 0.6 oz)   01/16/24 0838 70.9 kg (156 lb 4.9 oz)   01/15/24 0739 72 kg (158 lb 11.7 oz)       Body mass index is 21.98 kg/m².      Intake/Output Summary (Last 24 hours) at 1/17/2024 1135  Last data  filed at 1/17/2024 0508  Gross per 24 hour   Intake 480 ml   Output 175 ml   Net 305 ml         Hemodynamic Parameters:       Telemetry: SR       Physical Exam  Constitutional:       Comments: Cachectic, temporal wasting   HENT:      Head: Normocephalic and atraumatic.   Eyes:      Conjunctiva/sclera: Conjunctivae normal.      Pupils: Pupils are equal, round, and reactive to light.   Neck:      Comments: Do not appreciate elevated JVP  Cardiovascular:      Rate and Rhythm: Normal rate and regular rhythm.      Comments: Smooth VAD hum  Pulmonary:      Effort: Pulmonary effort is normal.      Breath sounds: Normal breath sounds.   Abdominal:      General: Bowel sounds are normal.      Palpations: Abdomen is soft.   Musculoskeletal:         General: No swelling. Normal range of motion.      Cervical back: Normal range of motion and neck supple.   Skin:     General: Skin is warm and dry.      Capillary Refill: Capillary refill takes 2 to 3 seconds.   Neurological:      General: No focal deficit present.      Mental Status: He is alert and oriented to person, place, and time.            Significant Labs:  CBC:  Recent Labs   Lab 01/15/24  0315 01/16/24  0244 01/17/24  0541   WBC 6.81 6.15 6.36   RBC 2.49* 2.41* 2.42*   HGB 7.0* 6.9* 6.9*   HCT 22.8* 22.6* 22.7*    374 411   MCV 92 94 94   MCH 28.1 28.6 28.5   MCHC 30.7* 30.5* 30.4*       BNP:  Recent Labs   Lab 01/12/24  0535 01/15/24  0315 01/17/24  0541   * 748* 906*       CMP:  Recent Labs   Lab 01/15/24  0314 01/16/24  0244 01/17/24  0541   GLU 93 89 78   CALCIUM 8.8 8.6* 8.7   ALBUMIN 2.0* 2.0* 2.0*   PROT 7.2 7.0 7.0    137 136   K 4.1 4.9 5.0   CO2 24 22* 20*    105 106   * 90* 81*   CREATININE 4.8* 4.6* 4.3*   ALKPHOS 137* 138* 139*   ALT 29 37 38   AST 33 43* 36   BILITOT 0.2 0.2 0.2        Coagulation:   Recent Labs   Lab 01/15/24  0315 01/16/24  0244 01/17/24  0541   INR 2.1* 2.7* 3.2*       LDH:  Recent Labs   Lab  01/15/24  0315 01/16/24  0244 01/17/24  0541   * 308* 304*       Microbiology:  Microbiology Results (last 7 days)       ** No results found for the last 168 hours. **            I have reviewed all pertinent labs within the past 24 hours.    Estimated Creatinine Clearance: 18.8 mL/min (A) (based on SCr of 4.3 mg/dL (H)).    Diagnostic Results:  I have reviewed and interpreted all pertinent imaging results/findings within the past 24 hours.

## 2024-01-17 NOTE — PT/OT/SLP PROGRESS
"Physical Therapy Treatment    Patient Name:  Radha Abbott   MRN:  [id number]    Recommendations:     Discharge Recommendations: High Intensity Therapy  Discharge Equipment Recommendations: wheelchair, walker, rolling  Barriers to discharge:  inc level of assist required    Assessment:     Radha Abbott is a 63 y.o. male admitted with a medical diagnosis of LVAD (left ventricular assist device) present.  He presents with the following impairments/functional limitations: weakness, impaired endurance, impaired self care skills, impaired functional mobility, gait instability, impaired balance, decreased upper extremity function, decreased lower extremity function, decreased safety awareness, pain, impaired coordination, impaired fine motor, impaired cardiopulmonary response to activity. Pt w/ improved ability to perform sts transfers now that he no longer has sternal precautions, and ambulates w/ improved posture but requires increased rest breaks 2/2 increased energy expenditure. Patient has demonstrated sufficient progression to warrant high intensity therapy evidenced by objectives noted below.     Rehab Prognosis: Good; patient would benefit from acute skilled PT services to address these deficits and reach maximum level of function.    Recent Surgery: Procedure(s) (LRB):  CLOSURE, WOUND, STERNUM (N/A)  INSERTION, GRAFT, PERICARDIUM  DRAINAGE, PLEURAL EFFUSION 44 Days Post-Op    Plan:     During this hospitalization, patient to be seen 5 x/week to address the identified rehab impairments via gait training, therapeutic activities, therapeutic exercises, neuromuscular re-education and progress toward the following goals:    Plan of Care Expires:  02/05/24    Subjective     Chief Complaint: pain in R hip  Patient/Family Comments/goals: "you're asking me to do too much. Stop talking about my feet, I'm not tripping over them so I'm fine"  Pain/Comfort:  Pain Rating 1: 7/10  Location - Side 1: Right  Location - Orientation 1: " posterior  Location 1: hip  Pain Addressed 1: Reposition, Distraction, Pre-medicate for activity  Pain Rating Post-Intervention 1: 7/10      Objective:     Communicated with RN prior to session.  Pt discussed w/ MD team who stated pt no longer requires sternal precautions. Patient found up in chair with LVAD, telemetry upon PT entry to room. Rehab technician utilized to assist w/ treatment session 2/2 pt requiring two person assist for functional mobility     General Precautions: Standard, fall, LVAD  Orthopedic Precautions: N/A  Braces: N/A  Respiratory Status: Room air     Functional Mobility:  Transfers:     Sit to Stand:  6x min-modA with rolling walker w/ cueing for hand placement, scooting forward, and keeping feet shoulder width apart.  Gait: 172' w/ RW and 5 seated rest breaks. Pt requires Yonis when beginning to ambulate but progress to requiring modA with each trial 2/2 as he fatigues he begins pushing RW too far out infront of him, leaning forward excessively, and increases gina to try and finish walking faster. Pt had 1 LOB during gait training requiring maxA to recover balance. Chair follow utilized for pt safety.      AM-PAC 6 CLICK MOBILITY  Turning over in bed (including adjusting bedclothes, sheets and blankets)?: 3  Sitting down on and standing up from a chair with arms (e.g., wheelchair, bedside commode, etc.): 2  Moving from lying on back to sitting on the side of the bed?: 3  Moving to and from a bed to a chair (including a wheelchair)?: 2  Need to walk in hospital room?: 2  Climbing 3-5 steps with a railing?: 2  Basic Mobility Total Score: 14       Treatment & Education:  Pt educated on PT POC/goals, d/c recs, and continued treatment. All questions answered and pt in agreement w/ POC.  Pt educated on safe use of RW for transfers and ambulation; educated on no longer having sternal precautions and modifications to activity because of this  Pt educated on getting up to ambulate in the room w/  RN assist as able    Patient left up in chair with all lines intact, call button in reach, RN notified, and spouse present..    GOALS:   Multidisciplinary Problems       Physical Therapy Goals          Problem: Physical Therapy    Goal Priority Disciplines Outcome Goal Variances Interventions   Physical Therapy Goal     PT, PT/OT Ongoing, Progressing     Description: Goals to be met by: 23     Patient will increase functional independence with mobility by performin. Sit to stand transfer with modified independence  2. Bed to chair transfer with supervision  3. Gait  x 150 feet with supervision using physician approved AD with standing rest breaks prn.   4. Lower extremity exercise program x30 reps per handout, with independence  5. Recite 3/3 sternal precautions and remain complaint with precautions throughout session with no verbal cues                     Multidisciplinary Problems (Resolved)          Problem: Physical Therapy    Goal Priority Disciplines Outcome Goal Variances Interventions   Physical Therapy Goal   (Resolved)     PT, PT/OT Met     Description: Goals to be met by: 23     Patient will increase functional independence with mobility by performin. Evaluate pt's need for physical therapy.                          Time Tracking:     PT Received On: 24  PT Start Time: 939     PT Stop Time: 1011  PT Total Time (min): 32 min     Billable Minutes: Gait Training 32    Treatment Type: Treatment  PT/PTA: PT     Number of PTA visits since last PT visit: 0     2024

## 2024-01-18 PROBLEM — Z74.09 IMPAIRED MOBILITY: Status: ACTIVE | Noted: 2024-01-18

## 2024-01-18 LAB
ALBUMIN SERPL BCP-MCNC: 2 G/DL (ref 3.5–5.2)
ALP SERPL-CCNC: 124 U/L (ref 55–135)
ALT SERPL W/O P-5'-P-CCNC: 36 U/L (ref 10–44)
ANION GAP SERPL CALC-SCNC: 11 MMOL/L (ref 8–16)
AST SERPL-CCNC: 33 U/L (ref 10–40)
BASOPHILS # BLD AUTO: 0.05 K/UL (ref 0–0.2)
BASOPHILS NFR BLD: 0.9 % (ref 0–1.9)
BILIRUB DIRECT SERPL-MCNC: 0.1 MG/DL (ref 0.1–0.3)
BILIRUB SERPL-MCNC: 0.2 MG/DL (ref 0.1–1)
BUN SERPL-MCNC: 75 MG/DL (ref 8–23)
CALCIUM SERPL-MCNC: 8.6 MG/DL (ref 8.7–10.5)
CHLORIDE SERPL-SCNC: 107 MMOL/L (ref 95–110)
CO2 SERPL-SCNC: 20 MMOL/L (ref 23–29)
CREAT SERPL-MCNC: 4.2 MG/DL (ref 0.5–1.4)
DIFFERENTIAL METHOD BLD: ABNORMAL
EOSINOPHIL # BLD AUTO: 0.2 K/UL (ref 0–0.5)
EOSINOPHIL NFR BLD: 3.5 % (ref 0–8)
ERYTHROCYTE [DISTWIDTH] IN BLOOD BY AUTOMATED COUNT: 16.8 % (ref 11.5–14.5)
EST. GFR  (NO RACE VARIABLE): 15.3 ML/MIN/1.73 M^2
GLUCOSE SERPL-MCNC: 77 MG/DL (ref 70–110)
HCT VFR BLD AUTO: 23.1 % (ref 40–54)
HGB BLD-MCNC: 6.9 G/DL (ref 14–18)
IMM GRANULOCYTES # BLD AUTO: 0.02 K/UL (ref 0–0.04)
IMM GRANULOCYTES NFR BLD AUTO: 0.3 % (ref 0–0.5)
INR PPP: 4 (ref 0.8–1.2)
LDH SERPL L TO P-CCNC: 294 U/L (ref 110–260)
LYMPHOCYTES # BLD AUTO: 1 K/UL (ref 1–4.8)
LYMPHOCYTES NFR BLD: 17.2 % (ref 18–48)
MAGNESIUM SERPL-MCNC: 1.9 MG/DL (ref 1.6–2.6)
MCH RBC QN AUTO: 28.6 PG (ref 27–31)
MCHC RBC AUTO-ENTMCNC: 29.9 G/DL (ref 32–36)
MCV RBC AUTO: 96 FL (ref 82–98)
MONOCYTES # BLD AUTO: 0.5 K/UL (ref 0.3–1)
MONOCYTES NFR BLD: 8.7 % (ref 4–15)
NEUTROPHILS # BLD AUTO: 4 K/UL (ref 1.8–7.7)
NEUTROPHILS NFR BLD: 69.4 % (ref 38–73)
NRBC BLD-RTO: 0 /100 WBC
PHOSPHATE SERPL-MCNC: 4.4 MG/DL (ref 2.7–4.5)
PLATELET # BLD AUTO: 397 K/UL (ref 150–450)
PMV BLD AUTO: 8.7 FL (ref 9.2–12.9)
POCT GLUCOSE: 133 MG/DL (ref 70–110)
POCT GLUCOSE: 162 MG/DL (ref 70–110)
POTASSIUM SERPL-SCNC: 4.9 MMOL/L (ref 3.5–5.1)
PROT SERPL-MCNC: 6.8 G/DL (ref 6–8.4)
PROTHROMBIN TIME: 39.2 SEC (ref 9–12.5)
RBC # BLD AUTO: 2.41 M/UL (ref 4.6–6.2)
SODIUM SERPL-SCNC: 138 MMOL/L (ref 136–145)
WBC # BLD AUTO: 5.76 K/UL (ref 3.9–12.7)

## 2024-01-18 PROCEDURE — 85610 PROTHROMBIN TIME: CPT | Performed by: PHYSICIAN ASSISTANT

## 2024-01-18 PROCEDURE — 80048 BASIC METABOLIC PNL TOTAL CA: CPT | Performed by: INTERNAL MEDICINE

## 2024-01-18 PROCEDURE — 80076 HEPATIC FUNCTION PANEL: CPT | Performed by: INTERNAL MEDICINE

## 2024-01-18 PROCEDURE — 83735 ASSAY OF MAGNESIUM: CPT | Performed by: INTERNAL MEDICINE

## 2024-01-18 PROCEDURE — 25000003 PHARM REV CODE 250: Performed by: STUDENT IN AN ORGANIZED HEALTH CARE EDUCATION/TRAINING PROGRAM

## 2024-01-18 PROCEDURE — 25000003 PHARM REV CODE 250: Performed by: PHYSICIAN ASSISTANT

## 2024-01-18 PROCEDURE — 93750 INTERROGATION VAD IN PERSON: CPT | Mod: ,,, | Performed by: INTERNAL MEDICINE

## 2024-01-18 PROCEDURE — 25000003 PHARM REV CODE 250: Performed by: INTERNAL MEDICINE

## 2024-01-18 PROCEDURE — 27000207 HC ISOLATION

## 2024-01-18 PROCEDURE — 27000248 HC VAD-ADDITIONAL DAY

## 2024-01-18 PROCEDURE — 97535 SELF CARE MNGMENT TRAINING: CPT

## 2024-01-18 PROCEDURE — 84100 ASSAY OF PHOSPHORUS: CPT | Performed by: INTERNAL MEDICINE

## 2024-01-18 PROCEDURE — 83615 LACTATE (LD) (LDH) ENZYME: CPT | Performed by: STUDENT IN AN ORGANIZED HEALTH CARE EDUCATION/TRAINING PROGRAM

## 2024-01-18 PROCEDURE — 97530 THERAPEUTIC ACTIVITIES: CPT

## 2024-01-18 PROCEDURE — 85025 COMPLETE CBC W/AUTO DIFF WBC: CPT | Performed by: PHYSICIAN ASSISTANT

## 2024-01-18 PROCEDURE — 99222 1ST HOSP IP/OBS MODERATE 55: CPT | Mod: ,,, | Performed by: NURSE PRACTITIONER

## 2024-01-18 PROCEDURE — 99233 SBSQ HOSP IP/OBS HIGH 50: CPT | Mod: ,,, | Performed by: PHYSICIAN ASSISTANT

## 2024-01-18 PROCEDURE — 99499 UNLISTED E&M SERVICE: CPT | Mod: ,,, | Performed by: INTERNAL MEDICINE

## 2024-01-18 PROCEDURE — 99233 SBSQ HOSP IP/OBS HIGH 50: CPT | Mod: FS,,, | Performed by: INTERNAL MEDICINE

## 2024-01-18 PROCEDURE — 20600001 HC STEP DOWN PRIVATE ROOM

## 2024-01-18 PROCEDURE — 97116 GAIT TRAINING THERAPY: CPT

## 2024-01-18 PROCEDURE — 36415 COLL VENOUS BLD VENIPUNCTURE: CPT | Performed by: PHYSICIAN ASSISTANT

## 2024-01-18 RX ADMIN — CHOLECALCIFEROL TAB 25 MCG (1000 UNIT) 1000 UNITS: 25 TAB at 09:01

## 2024-01-18 RX ADMIN — GABAPENTIN 100 MG: 100 CAPSULE ORAL at 08:01

## 2024-01-18 RX ADMIN — FLUTICASONE PROPIONATE 100 MCG: 50 SPRAY, METERED NASAL at 08:01

## 2024-01-18 RX ADMIN — AMIODARONE HYDROCHLORIDE 400 MG: 200 TABLET ORAL at 08:01

## 2024-01-18 RX ADMIN — VENLAFAXINE 37.5 MG: 37.5 TABLET ORAL at 08:01

## 2024-01-18 RX ADMIN — POLYETHYLENE GLYCOL 3350 17 G: 17 POWDER, FOR SOLUTION ORAL at 08:01

## 2024-01-18 RX ADMIN — ACETAMINOPHEN 1000 MG: 500 TABLET ORAL at 08:01

## 2024-01-18 RX ADMIN — SENNOSIDES AND DOCUSATE SODIUM 2 TABLET: 8.6; 5 TABLET ORAL at 08:01

## 2024-01-18 RX ADMIN — MIRTAZAPINE 7.5 MG: 7.5 TABLET, FILM COATED ORAL at 08:01

## 2024-01-18 RX ADMIN — FAMOTIDINE 20 MG: 20 TABLET ORAL at 08:01

## 2024-01-18 RX ADMIN — TRAZODONE HYDROCHLORIDE 25 MG: 50 TABLET ORAL at 08:01

## 2024-01-18 RX ADMIN — Medication 1 TUBE: at 09:01

## 2024-01-18 RX ADMIN — Medication: at 09:01

## 2024-01-18 NOTE — PT/OT/SLP PROGRESS
Speech Language Pathology      Radha Abbott  MRN: [id number]    Patient not seen today secondary to off the floor for x-ray, unable to return during the PM. Wife in the room, discussed plans for repeat MBSS in the near future if MD team in agreement. Wife verbalizing understanding Will follow-up at next service date to meet SLP POC.

## 2024-01-18 NOTE — CARE UPDATE
Care Update:     No acute events overnight. Patient in room 312/312 A. Blood glucose stable on current inpatient regimen. 45 Days Post-Op    Steroid use- None.   Renal function-   Lab Results   Component Value Date    CREATININE 4.2 (H) 01/18/2024        Diet Minced & Moist (IDDSI Level 5) Low Potassium; Honey Thick (Moderately Thick)     POCT Glucose   Date Value Ref Range Status   01/17/2024 152 (H) 70 - 110 mg/dL Final   01/17/2024 106 70 - 110 mg/dL Final   01/17/2024 124 (H) 70 - 110 mg/dL Final   01/17/2024 76 70 - 110 mg/dL Final   01/16/2024 124 (H) 70 - 110 mg/dL Final   01/16/2024 108 70 - 110 mg/dL Final   01/16/2024 151 (H) 70 - 110 mg/dL Final   01/16/2024 101 70 - 110 mg/dL Final   01/15/2024 149 (H) 70 - 110 mg/dL Final   01/15/2024 159 (H) 70 - 110 mg/dL Final   01/15/2024 155 (H) 70 - 110 mg/dL Final   01/15/2024 169 (H) 70 - 110 mg/dL Final     Lab Results   Component Value Date    HGBA1C 5.1 01/13/2024         Home Medications: Metformin (off since October)       Endocrine  Type 2 diabetes mellitus without complication, without long-term current use of insulin  BG goal 140-180.      - LDC (150/50) prn AC/HS   - POCT Glucose every AC/HS  - Hypoglycemia protocol in place      ** Please notify Endocrine for any change and/or advance in diet**  ** Please call Endocrine for any BG related issues **     Discharge Planning:   TBD. Please notify endocrinology prior to discharge.     No insulin requirements in the last 1-3 days and tolerating PO intake. If trend continues, may consider signing off soon.

## 2024-01-18 NOTE — ASSESSMENT & PLAN NOTE
- S/p LVAD 12/1/23.  -  Continues Coumadin. Goal INR 2-3. Weaned off Midodrine.   - Not listed for OHTx, declined for OHTX due to comorbidities.

## 2024-01-18 NOTE — NURSING TRANSFER
Nursing Transfer Note      1/18/2024   11:34 AM  Reason patient is being transferred:XRAY sacrum    Transfer To: Radiology(xray)    Transfer via wheelchair    Transfer with -cardiac monitoring,LVAD emergency bag    Transported by- Transport    Telemetry: Box Number [id number]  Order for Tele Monitor? YES    Medicines sent: None    Any special needs or follow-up needed: assistance with ambulation     Chart send with patient: No

## 2024-01-18 NOTE — PLAN OF CARE
Consulted PM&R on 1/17/24 for IPR rehab recs placed by PT/OT    1/11/24: patient and wife checked off on VAD alarms, TE Ma RN/VC       Patient now eating 100% via po route  NGT removed    Nephrology following renal function closely ---no HD since 1/10/24    PT/OT recs are IPR---STANLEY next week pending kidney function/volume status remains stable    Placed Referral to [facility] (Only Rehab that can accept LVADs)   STANLEY next week pending medical stability

## 2024-01-18 NOTE — PLAN OF CARE
VSS, pt refused to go to Xray @2200, RT said will try in the AM    Problem: Diabetes Comorbidity  Goal: Blood Glucose Level Within Targeted Range  Outcome: Ongoing, Progressing     Problem: Fluid and Electrolyte Imbalance (Acute Kidney Injury/Impairment)  Goal: Fluid and Electrolyte Balance  Outcome: Ongoing, Progressing     Problem: Cardiac Output Decreased  Goal: Effective Cardiac Output  Outcome: Ongoing, Progressing

## 2024-01-18 NOTE — ASSESSMENT & PLAN NOTE
- Related to prolonged/acute hospital course.     Recommendations  -  Encourage mobility, OOB in chair at least 3 hours per day, and early ambulation as appropriate  -  PT/OT evaluate and treat  -  Pain management  -  Monitor for and prevent skin breakdown and pressure ulcers  Early mobility, repositioning/weight shifting every 20-30 minutes when sitting, turn patient every 2 hours, proper mattress/overlay and chair cushioning, pressure relief/heel protector boots  -  DVT prophylaxis    -  Reviewed discharge options (IP rehab, SNF, HH therapy, and OP therapy)

## 2024-01-18 NOTE — PROGRESS NOTES
[facility] - [facility] Stepdown  Nephrology  Progress Note    Patient Name: Radha Abbott  MRN: [id number]  Admission Date: 11/9/2023  Hospital Length of Stay: 70 days  Attending Provider: Sandhya Price MD   Primary Care Physician: Vasu Kong MD  Principal Problem:LVAD (left ventricular assist device) present    Subjective:     Interval History: Scr 4.2 from 4.3 yesterday. Pt reports increase UOP. Not requiring diuretics. Appears euvolemic. Cystatin C pending.     Review of patient's allergies indicates:  No Known Allergies  Current Facility-Administered Medications   Medication Frequency    0.9%  NaCl infusion (for blood administration) Q24H PRN    0.9%  NaCl infusion Continuous    acetaminophen tablet 1,000 mg TID    alteplase injection 2 mg Once    amiodarone tablet 400 mg Daily    balsam peru-castor oiL Oint BID    bisacodyL suppository 10 mg Daily PRN    dextrose 10% bolus 125 mL 125 mL PRN    dextrose 10% bolus 125 mL 125 mL PRN    dextrose 10% bolus 250 mL 250 mL PRN    dextrose 10% bolus 250 mL 250 mL PRN    epoetin zohaib injection 8,100 Units Every Tues, Thurs, Sat    famotidine tablet 20 mg Daily    fluticasone propionate 50 mcg/actuation nasal spray 100 mcg Daily    gabapentin capsule 100 mg BID    glucagon (human recombinant) injection 1 mg PRN    glucose chewable tablet 16 g PRN    glucose chewable tablet 24 g PRN    heparin (porcine) injection 1,000 Units PRN    hydrALAZINE injection 10 mg Q6H PRN    insulin aspart U-100 pen 0-5 Units QID (AC + HS) PRN    mirtazapine tablet 7.5 mg Nightly    ondansetron injection 4 mg Q6H PRN    polyethylene glycol packet 17 g Daily    psyllium husk (aspartame) 3.4 gram packet 1 packet Daily    senna-docusate 8.6-50 mg per tablet 2 tablet Daily    sodium chloride 0.9% flush 10 mL PRN    traMADoL tablet 50 mg Q8H PRN    trazodone split tablet 25 mg QHS    venlafaxine tablet 37.5 mg Daily    vitamin D 1000 units tablet 1,000 Units Daily    zolpidem tablet 5 mg  Nightly PRN       Objective:     Vital Signs (Most Recent):  Temp: 97.5 °F (36.4 °C) (01/18/24 1120)  Pulse: 82 (01/18/24 1215)  Resp: 18 (01/18/24 1120)  BP: (!) 88/0 (01/18/24 1215)  SpO2: (!) 94 % (01/18/24 1215) Vital Signs (24h Range):  Temp:  [97.1 °F (36.2 °C)-99 °F (37.2 °C)] 97.5 °F (36.4 °C)  Pulse:  [80-91] 82  Resp:  [16-19] 18  SpO2:  [90 %-100 %] 94 %  BP: ()/(0-66) 88/0     Weight: 71.7 kg (158 lb 1.1 oz) (01/18/24 0755)  Body mass index is 21.44 kg/m².  Body surface area is 1.91 meters squared.    I/O last 3 completed shifts:  In: 1229 [P.O.:1229]  Out: 725 [Urine:725]     Physical Exam  Vitals and nursing note reviewed.   Constitutional:       Comments: Cachectic, temporal wasting   HENT:      Head: Normocephalic and atraumatic.   Eyes:      Conjunctiva/sclera: Conjunctivae normal.      Pupils: Pupils are equal, round, and reactive to light.   Neck:      Comments: Do not appreciate elevated JVP  Cardiovascular:      Rate and Rhythm: Normal rate and regular rhythm.      Comments: LVAD  Pulmonary:      Effort: Pulmonary effort is normal.      Breath sounds: Normal breath sounds.   Abdominal:      General: Bowel sounds are normal.      Palpations: Abdomen is soft.   Musculoskeletal:         General: No swelling. Normal range of motion.      Cervical back: Normal range of motion and neck supple.   Skin:     General: Skin is warm and dry.      Capillary Refill: Capillary refill takes 2 to 3 seconds.   Neurological:      General: No focal deficit present.      Mental Status: He is alert and oriented to person, place, and time.          Significant Labs:  CBC:   Recent Labs   Lab 01/18/24  0305   WBC 5.76   RBC 2.41*   HGB 6.9*   HCT 23.1*      MCV 96   MCH 28.6   MCHC 29.9*     CMP:   Recent Labs   Lab 01/18/24  0305   GLU 77   CALCIUM 8.6*   ALBUMIN 2.0*   PROT 6.8      K 4.9   CO2 20*      BUN 75*   CREATININE 4.2*   ALKPHOS 124   ALT 36   AST 33   BILITOT 0.2     All labs  within the past 24 hours have been reviewed.     Assessment/Plan:     Cardiac/Vascular  * LVAD (left ventricular assist device) present  -management per primary     Renal/  Acute renal failure with acute tubular necrosis superimposed on stage 3b chronic kidney disease  - suspect acute tubular injury secondary to hypotension/cardiogenic shock likely compounded by intra-arterial contrast and anemia with urinary sediment with granular casts    -No acute indication for RRT   -Cystatin C - pending   -strict I/O's  -Continue to monitor for signs if renal recovery   -renal diet/tube feeds when not NPO, volume restriction per primary team  -renally all dose medications to eGFR  -avoid nephrotoxic agents wean feasible (i.e. NSAIDs, intra-arterial contrast, supra-therapeutic vancomycin levels, etc.)          Thank you for your consult. I will follow-up with patient. Please contact us if you have any additional questions.    Yolanda Cai DNP  Nephrology  [facility] - Cardiology Step[facility]

## 2024-01-18 NOTE — PROGRESS NOTES
Update:    SW met with pt and his wife to f/up. Pt reported that a someone from rehab visited him today.  He voiced understanding that he will transfer over once auth is received. Pt reported being anxious to go to rehab in order to get stronger to go home. No needs reported at this time.   Support and encouragement given.  SW will follow.

## 2024-01-18 NOTE — SUBJECTIVE & OBJECTIVE
Interval History: NAEON. No acute complaints. Last HD session 1/10. Kidney function stable and appears euvolemic. Holding his coumadin.     Continuous Infusions:   sodium chloride 0.9%       Scheduled Meds:   acetaminophen  1,000 mg Per NG tube TID    alteplase  2 mg Intra-Catheter Once    amiodarone  400 mg Oral Daily    balsam peru-castor oiL   Topical (Top) BID    epoetin zohaib (PROCRIT) injection  50 Units/kg Subcutaneous Every Tues, Thurs, Sat    famotidine  20 mg Oral Daily    fluticasone propionate  2 spray Each Nostril Daily    gabapentin  100 mg Oral BID    mirtazapine  7.5 mg Oral Nightly    polyethylene glycol  17 g Oral Daily    psyllium husk (aspartame)  1 packet Oral Daily    senna-docusate 8.6-50 mg  2 tablet Oral Daily    traZODone  25 mg Oral QHS    venlafaxine  37.5 mg Oral Daily    vitamin D  1,000 Units Oral Daily     PRN Meds:0.9%  NaCl infusion (for blood administration), bisacodyL, dextrose 10%, dextrose 10%, dextrose 10%, dextrose 10%, glucagon (human recombinant), glucose, glucose, heparin (porcine), hydrALAZINE, insulin aspart U-100, ondansetron, Flushing PICC/Midline Protocol **AND** [DISCONTINUED] sodium chloride 0.9% **AND** sodium chloride 0.9%, traMADoL, zolpidem    Review of patient's allergies indicates:  No Known Allergies  Objective:     Vital Signs (Most Recent):  Temp: 98.1 °F (36.7 °C) (01/18/24 0755)  Pulse: 88 (01/18/24 0959)  Resp: 18 (01/18/24 0755)  BP: (!) 76/0 (01/18/24 0758)  SpO2: 95 % (01/18/24 0755) Vital Signs (24h Range):  Temp:  [97.1 °F (36.2 °C)-99 °F (37.2 °C)] 98.1 °F (36.7 °C)  Pulse:  [80-94] 88  Resp:  [16-19] 18  SpO2:  [95 %-100 %] 95 %  BP: ()/(0-66) 76/0     Patient Vitals for the past 72 hrs (Last 3 readings):   Weight   01/18/24 0755 71.7 kg (158 lb 1.1 oz)   01/17/24 0700 73.5 kg (162 lb 0.6 oz)   01/16/24 0838 70.9 kg (156 lb 4.9 oz)       Body mass index is 21.44 kg/m².      Intake/Output Summary (Last 24 hours) at 1/18/2024 1110  Last data  filed at 1/18/2024 0905  Gross per 24 hour   Intake 1386 ml   Output 450 ml   Net 936 ml         Hemodynamic Parameters:       Telemetry: SR       Physical Exam  Constitutional:       Comments: Cachectic, temporal wasting   HENT:      Head: Normocephalic and atraumatic.   Eyes:      Conjunctiva/sclera: Conjunctivae normal.      Pupils: Pupils are equal, round, and reactive to light.   Neck:      Comments: Do not appreciate elevated JVP  Cardiovascular:      Rate and Rhythm: Normal rate and regular rhythm.      Comments: Smooth VAD hum  Pulmonary:      Effort: Pulmonary effort is normal.      Breath sounds: Normal breath sounds.   Abdominal:      General: Bowel sounds are normal.      Palpations: Abdomen is soft.   Musculoskeletal:         General: No swelling. Normal range of motion.      Cervical back: Normal range of motion and neck supple.   Skin:     General: Skin is warm and dry.      Capillary Refill: Capillary refill takes 2 to 3 seconds.   Neurological:      General: No focal deficit present.      Mental Status: He is alert and oriented to person, place, and time.            Significant Labs:  CBC:  Recent Labs   Lab 01/16/24  0244 01/17/24  0541 01/18/24  0305   WBC 6.15 6.36 5.76   RBC 2.41* 2.42* 2.41*   HGB 6.9* 6.9* 6.9*   HCT 22.6* 22.7* 23.1*    411 397   MCV 94 94 96   MCH 28.6 28.5 28.6   MCHC 30.5* 30.4* 29.9*       BNP:  Recent Labs   Lab 01/12/24  0535 01/15/24  0315 01/17/24  0541   * 748* 906*       CMP:  Recent Labs   Lab 01/16/24  0244 01/17/24  0541 01/18/24  0305   GLU 89 78 77   CALCIUM 8.6* 8.7 8.6*   ALBUMIN 2.0* 2.0* 2.0*   PROT 7.0 7.0 6.8    136 138   K 4.9 5.0 4.9   CO2 22* 20* 20*    106 107   BUN 90* 81* 75*   CREATININE 4.6* 4.3* 4.2*   ALKPHOS 138* 139* 124   ALT 37 38 36   AST 43* 36 33   BILITOT 0.2 0.2 0.2        Coagulation:   Recent Labs   Lab 01/16/24  0244 01/17/24  0541 01/18/24  0305   INR 2.7* 3.2* 4.0*       LDH:  Recent Labs   Lab  01/16/24  0244 01/17/24  0541 01/18/24  0305   * 304* 294*       Microbiology:  Microbiology Results (last 7 days)       Procedure Component Value Units Date/Time    AFB Culture & Smear [[id number]] Collected: 12/06/23 1532    Order Status: Completed Specimen: Body Fluid from Lung, Right Updated: 01/18/24 0928     AFB Culture & Smear Culture in progress      Culture in progress     AFB CULTURE STAIN No acid fast bacilli seen.    Narrative:      Bronchial Wash            I have reviewed all pertinent labs within the past 24 hours.    Estimated Creatinine Clearance: 18.7 mL/min (A) (based on SCr of 4.2 mg/dL (H)).    Diagnostic Results:  I have reviewed and interpreted all pertinent imaging results/findings within the past 24 hours.

## 2024-01-18 NOTE — HPI
Radha Abbott is a 63-year-old male with PMHx of CAD s/p 3v CABG (unclear anatomy, 2009), ICM with a recent EF of 15-20% s/p ICD (elsa 2009), DM2 (a1c 7.7), HTN, HLD, Vfib on amio. Patient presented to [facility] on for SOB. On arrival, started on lasix drip. Started on gDMT and discharged home on  5 with plans to follow up in [facility] clinic for ongoing transplant evaluation at another facility. Presented back to [facility] on 11/9/23 for worsening SOB and weight gain. S/p LVAD 12/1/23. Continues Coumadin. Goal INR 2-3. Weaned off Midodrine. Not listed for OHTx, declined for OHTX due to comorbidities. Hospital course complicated by acute renal failure (Nephrology following and suspect acute tubular injury secondary to hypotension/cardiogenic shock likely compounded by intra-arterial contrast and anemia with urinary sediment with granular casts. Was on HD, last HD 1/10/23), ID consulted and per ID unclear significance of the pseudomonas culture but given his symptoms complete pip-tazo 4.5 q12 hours for 5 day course, EOC 1/10/24, hx of Vt (continues on home amio).    MBSS pending, INR therapeutic, and RHC pending.    INR 2.5 today   R HC completed today 1/22  Per SLP today Minced & Moist Diet - IDDSI Level 5, Liquid Diet Level: Moderately thick liquids - IDDSI Level 3   all liquids should be thickened to a honey thick consistency- pt is a silent aspirator per most recent MBSS   Heart cath completed yestererday RHC showed increased wedge pressure and requires diuretics. Agree with IV lasix, and can add intermittent diuril 500 mg if needed. Will continue to monitor kidney function.   H&H dropped today 6.7 & 22 getting transfused today     He will get a repeat MBSS keven and we are thinking WED transfer to rehab---No HD since 1/10     Functional History: Patient lives in [place] with wife in a single story home with 1 step to enter.  Prior to admission, I. DME: none.

## 2024-01-18 NOTE — SUBJECTIVE & OBJECTIVE
Past Medical History:   Diagnosis Date    CAD (coronary artery disease)     Diabetes mellitus     HFrEF (heart failure with reduced ejection fraction)     ICD (implantable cardioverter-defibrillator) in place     MI, old      Past Surgical History:   Procedure Laterality Date    ANGIOPLASTY-VENOUS ARTERY Right 12/1/2023    Procedure: ANGIOPLASTY-VENOUS ARTERY, RIGHT FEMORAL;  Surgeon: Yuri Wsahington MD;  Location: [facility] OR [address] [place] [address];  Service: Cardiovascular;  Laterality: Right;    AORTIC VALVULOPLASTY N/A 12/1/2023    Procedure: REPAIR, AORTIC VALVE;  Surgeon: Yuri Washington MD;  [facility];  Service: Cardiovascular;  Laterality: N/A;    CARDIAC SURGERY      CLOSURE N/A 12/1/2023    Procedure: CLOSURE, TEMPORARY;  Surgeon: Yuri Washington MD;  [facility];  Service: Cardiovascular;  Laterality: N/A;    DRAINAGE OF PLEURAL EFFUSION  12/4/2023    Procedure: DRAINAGE, PLEURAL EFFUSION;  Surgeon: Yuri Washington MD;  [facility];  Service: Cardiovascular;;    INSERTION OF GRAFT TO PERICARDIUM  12/4/2023    Procedure: INSERTION, GRAFT, PERICARDIUM;  Surgeon: Yuri Washington MD;  [facility];  Service: Cardiovascular;;    INSERTION OF INTRA-AORTIC BALLOON ASSIST DEVICE Right 11/21/2023    Procedure: INSERTION, INTRA-AORTIC BALLOON PUMP;  Surgeon: Finn Cohn MD;  Location: [facility] CATH LAB;  Service: Cardiology;  Laterality: Right;    LEFT VENTRICULAR ASSIST DEVICE Left 12/1/2023    Procedure: INSERTION-LEFT VENTRICULAR ASSIST DEVICE;  Surgeon: Yuri Washington MD;  [facility];  Service: Cardiovascular;  Laterality: Left;  REDO STERNOTOMY - REDO SAW NEEDED FOR CASE    LYSIS OF ADHESIONS  12/1/2023    Procedure: LYSIS, ADHESIONS;  Surgeon: Yuri Washington MD;  [facility];  Service: Cardiovascular;;    PLACEMENT OF SWAN ROLANDO CATHETER WITH IMAGING GUIDANCE  11/20/2023    Procedure: INSERTION, CATHETER, SWAN-ROLANDO, WITH IMAGING  GUIDANCE;  Surgeon: Sajan Hurley MD;  Location: [facility] CATH LAB;  Service: Cardiology;;    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 12/1/2023    Procedure: REPAIR, ANEURYSM, ARTERY, FEMORAL;  Surgeon: Yuri Washington MD;  [facility];  Service: Cardiovascular;  Laterality: Right;  Right Femoral Artery Repair    RIGHT HEART CATHETERIZATION Right 10/10/2023    Procedure: INSERTION, CATHETER, RIGHT HEART;  Surgeon: Bin Gandhi MD;  Location: [facility] C[facility] LAB;  Service: Cardiology;  Laterality: Right;    RIGHT HEART CATHETERIZATION Right 10/13/2023    Procedure: INSERTION, CATHETER, RIGHT HEART;  Surgeon: Walter Mcintyre MD;  Location: [facility] CATH LAB;  Service: Cardiology;  Laterality: Right;    RIGHT HEART CATHETERIZATION  11/13/2023    RIGHT HEART CATHETERIZATION Right 11/13/2023    Procedure: INSERTION, CATHETER, RIGHT HEART;  Surgeon: Juventino Bermudez Jr., MD;  Location: [facility] CATH LAB;  Service: Cardiology;  Laterality: Right;    RIGHT HEART CATHETERIZATION Right 11/20/2023    Procedure: INSERTION, CATHETER, RIGHT HEART;  Surgeon: Sajan Hurley MD;  Location: [facility] CATH LAB;  Service: Cardiology;  Laterality: Right;    STERNAL WOUND CLOSURE N/A 12/4/2023    Procedure: CLOSURE, WOUND, STERNUM;  Surgeon: Yuri Washington MD;  [address]  Service: Cardiovascular;  Laterality: N/A;    STERNOTOMY N/A 12/1/2023    Procedure: STERNOTOMY, REDO;  Surgeon: Yuri Washington MD;  [facility];  Service: Cardiovascular;  Laterality: N/A;    VALVULOPLASTY, MITRAL VALVE N/A 12/1/2023    Procedure: VALVULOPLASTY, MITRAL VALVE;  Surgeon: Yuri Washington MD;  [facility];  Service: Cardiovascular;  Laterality: N/A;     Review of patient's allergies indicates:  No Known Allergies    Scheduled Medications:    acetaminophen  1,000 mg Per NG tube TID    alteplase  2 mg Intra-Catheter Once    amiodarone  400 mg Oral Daily    balsam peru-castor oiL   Topical  (Top) BID    epoetin zohaib (PROCRIT) injection  50 Units/kg Subcutaneous Every Tues, Thurs, Sat    famotidine  20 mg Oral Daily    fluticasone propionate  2 spray Each Nostril Daily    gabapentin  100 mg Oral BID    mirtazapine  7.5 mg Oral Nightly    polyethylene glycol  17 g Oral Daily    psyllium husk (aspartame)  1 packet Oral Daily    senna-docusate 8.6-50 mg  2 tablet Oral Daily    traZODone  25 mg Oral QHS    venlafaxine  37.5 mg Oral Daily    vitamin D  1,000 Units Oral Daily       PRN Medications: 0.9%  NaCl infusion (for blood administration), bisacodyL, dextrose 10%, dextrose 10%, dextrose 10%, dextrose 10%, glucagon (human recombinant), glucose, glucose, heparin (porcine), hydrALAZINE, insulin aspart U-100, ondansetron, Flushing PICC/Midline Protocol **AND** [DISCONTINUED] sodium chloride 0.9% **AND** sodium chloride 0.9%, traMADoL, zolpidem    Family History    None       Tobacco Use    Smoking status: Every Day     Current packs/day: 0.50     Types: Cigarettes    Smokeless tobacco: Not on file   Substance and Sexual Activity    Alcohol use: Yes     Comment: rarely    Drug use: No    Sexual activity: Not on file     Review of Systems   Constitutional:  Positive for activity change. Negative for fatigue.   Respiratory:  Negative for cough and shortness of breath.    Gastrointestinal:  Negative for abdominal pain.   Musculoskeletal:  Positive for gait problem. Negative for arthralgias and back pain.   Neurological:  Positive for weakness.   Psychiatric/Behavioral:  Negative for agitation, behavioral problems and confusion.      Objective:     Vital Signs (Most Recent):  Temp: 97.5 °F (36.4 °C) (01/18/24 1120)  Pulse: 82 (01/18/24 1215)  Resp: 18 (01/18/24 1120)  BP: (!) 88/0 (01/18/24 1215)  SpO2: (!) 94 % (01/18/24 1215)    Vital Signs (24h Range):  Temp:  [97.1 °F (36.2 °C)-99 °F (37.2 °C)] 97.5 °F (36.4 °C)  Pulse:  [80-91] 82  Resp:  [16-19] 18  SpO2:  [90 %-100 %] 94 %  BP: ()/(0-66) 88/0      Body mass index is 21.44 kg/m².     Physical Exam  Vitals and nursing note reviewed.   HENT:      Nose: Nose normal.      Mouth/Throat:      Mouth: Mucous membranes are moist.   Eyes:      Extraocular Movements: Extraocular movements intact.      Pupils: Pupils are equal, round, and reactive to light.   Cardiovascular:      Comments: LVAD  Pulmonary:      Effort: Pulmonary effort is normal. No respiratory distress.   Musculoskeletal:      Comments: Deconditioned and generalized weakness   Skin:     General: Skin is warm.   Neurological:      Mental Status: He is alert and oriented to person, place, and time. Mental status is at baseline.      Motor: Weakness present.      Gait: Gait abnormal.   Psychiatric:         Mood and Affect: Mood normal.         Behavior: Behavior normal.          NEUROLOGICAL EXAMINATION:     MENTAL STATUS   Oriented to person, place, and time.     CRANIAL NERVES     CN III, IV, VI   Pupils are equal, round, and reactive to light.      Diagnostic Results: Labs: Reviewed  ECG: Reviewed  CT: Reviewed

## 2024-01-18 NOTE — ASSESSMENT & PLAN NOTE
-HeartMate 3 Implanted  12/1/2023  as DT  -HTS Primary  -Implanted by Dr. Washington  -Continue Coumadin, dosing by PharmD.  Goal INR 2.0-3.0 . Subtherapeutic today.   -Antiplatelets Not on ASA  -LDH is stable overall today. Will continue to monitor daily.  -Weaned off midodrine.  -Speed set at  4900   rpm, LSL 4500 rpm   -Interrogation notable for no events  -ECHO 1/2/24 mild TR, LVEDD 6.25 with HM3 set to 4900 rpm   -Not listed for OHTx, declined for OHTX due to comorbidities   -PT/OT/Speech. Recommending rehab but HD + LVAD may be barrier      Procedure: Device Interrogation Including analysis of device parameters  Current Settings: Ventricular Assist Device  Review of device function is stable/unstable stable        1/18/2024     7:59 AM 1/18/2024     3:14 AM 1/17/2024    11:39 PM 1/17/2024     8:27 PM 1/17/2024     3:51 PM 1/17/2024    11:50 AM 1/17/2024     8:24 AM   TXP LVAD INTERROGATIONS   Type HeartMate3 HeartMate3 HeartMate3 HeartMate3 HeartMate3 HeartMate3 HeartMate3   Flow 4.1 3.7 3.5 3.8 3.8 3.8 3.6   Speed 4900 4900 4950 4900 4900 4900 4900   PI 3.6 5.8 6.8 5.2 5.2 5.4 6   Power ([place]) 3.3 3.2 3.2 3.2 3.4 3.3 3.3   LSL 4500  4500 4500 4500 4500 4500   Low Flow Alarm no    no     High Power Alarm no    no     Pulsatility Intermittent pulse  Intermittent pulse Intermittent pulse Intermittent pulse Intermittent pulse Intermittent pulse

## 2024-01-18 NOTE — HOSPITAL COURSE
1/17/24: Participated w/ PT. Ambulated 172' w/ RW and 5 seated rest breaks. Pt requires Yonis when beginning to ambulate but progress to requiring modA with each trial 2/2 as he fatigues he begins pushing RW too far out infront of him, leaning forward excessively, and increases gina to try and finish walking faster. Pt had 1 LOB during gait training requiring maxA to recover balance. Chair follow utilized for pt safety.   1/18/24: Participated w/ OT. Patient completed Sit <> Stand Transfer with minimum assistance and moderate assistance  with  rolling walker   2 x 4 reps from wheelchair  Prolonged rest break required due to fatigue

## 2024-01-18 NOTE — PLAN OF CARE
AAOX4,VSS,O2 sats>92% on RA.Patient ambulating with assistx2, fall precautions in place,no falls/injuries through the shift,LVAD DP and numbers WNL, smooth LVAD hum. Patient has no complaints of chest pain/SOB/palpitations/HA.Discussed medications and care.Patient has no questions at this time.Pt visualised and stable with no acute distress.Pt resting well,call light within reach, bed at the lowest position.Spouse by the bedside.    Problem: Adult Inpatient Plan of Care  Goal: Plan of Care Review  1/18/2024 1413 by Lashae Weber RN  Outcome: Ongoing, Progressing  1/18/2024 1412 by Lashae Weber RN  Outcome: Ongoing, Progressing  Goal: Patient-Specific Goal (Individualized)  1/18/2024 1413 by Lashae Weber RN  Outcome: Ongoing, Progressing  1/18/2024 1412 by Lashae Weber RN  Outcome: Ongoing, Progressing  Goal: Absence of Hospital-Acquired Illness or Injury  1/18/2024 1413 by Lashae Weber RN  Outcome: Ongoing, Progressing  1/18/2024 1412 by Lashae Weber RN  Outcome: Ongoing, Progressing  Goal: Optimal Comfort and Wellbeing  1/18/2024 1413 by Lashae Weber RN  Outcome: Ongoing, Progressing  1/18/2024 1412 by Lashae Weber RN  Outcome: Ongoing, Progressing  Goal: Readiness for Transition of Care  1/18/2024 1413 by Lashae Weber RN  Outcome: Ongoing, Progressing  1/18/2024 1412 by Lashae Weber RN  Outcome: Ongoing, Progressing     Problem: Diabetes Comorbidity  Goal: Blood Glucose Level Within Targeted Range  1/18/2024 1413 by Lashae Weber RN  Outcome: Ongoing, Progressing  1/18/2024 1412 by Lashae Weber RN  Outcome: Ongoing, Progressing     Problem: Fluid and Electrolyte Imbalance (Acute Kidney Injury/Impairment)  Goal: Fluid and Electrolyte Balance  1/18/2024 1413 by Lashae Weber RN  Outcome: Ongoing, Progressing  1/18/2024 1412 by Lashae Weber, RN  Outcome: Ongoing, Progressing     Problem: Oral Intake Inadequate (Acute Kidney Injury/Impairment)  Goal: Optimal Nutrition  Intake  1/18/2024 1413 by Lashae Weber RN  Outcome: Ongoing, Progressing  1/18/2024 1412 by Lashae Weber RN  Outcome: Ongoing, Progressing     Problem: Renal Function Impairment (Acute Kidney Injury/Impairment)  Goal: Effective Renal Function  1/18/2024 1413 by Lashae Weber RN  Outcome: Ongoing, Progressing  1/18/2024 1412 by Lashae Weber RN  Outcome: Ongoing, Progressing     Problem: Infection  Goal: Absence of Infection Signs and Symptoms  1/18/2024 1413 by Lashae Weber RN  Outcome: Ongoing, Progressing  1/18/2024 1412 by Lashae Weber RN  Outcome: Ongoing, Progressing     Problem: Adjustment to Illness (Heart Failure)  Goal: Optimal Coping  1/18/2024 1413 by Lashae Weber RN  Outcome: Ongoing, Progressing  1/18/2024 1412 by Lashae Weber RN  Outcome: Ongoing, Progressing     Problem: Cardiac Output Decreased (Heart Failure)  Goal: Optimal Cardiac Output  1/18/2024 1413 by Lashae Weber RN  Outcome: Ongoing, Progressing  1/18/2024 1412 by Lashae Weber RN  Outcome: Ongoing, Progressing     Problem: Dysrhythmia (Heart Failure)  Goal: Stable Heart Rate and Rhythm  1/18/2024 1413 by Lashae Weber RN  Outcome: Ongoing, Progressing  1/18/2024 1412 by Lashae Weber RN  Outcome: Ongoing, Progressing     Problem: Fluid Imbalance (Heart Failure)  Goal: Fluid Balance  1/18/2024 1413 by Lashae Weber RN  Outcome: Ongoing, Progressing  1/18/2024 1412 by Lashae Weber RN  Outcome: Ongoing, Progressing     Problem: Functional Ability Impaired (Heart Failure)  Goal: Optimal Functional Ability  1/18/2024 1413 by Lashae Weber RN  Outcome: Ongoing, Progressing  1/18/2024 1412 by Lashae Weber RN  Outcome: Ongoing, Progressing     Problem: Oral Intake Inadequate (Heart Failure)  Goal: Optimal Nutrition Intake  1/18/2024 1413 by Lashae Weber RN  Outcome: Ongoing, Progressing  1/18/2024 1412 by Lashae Weber RN  Outcome: Ongoing, Progressing     Problem: Respiratory Compromise (Heart  Failure)  Goal: Effective Oxygenation and Ventilation  1/18/2024 1413 by Lashae Weber RN  Outcome: Ongoing, Progressing  1/18/2024 1412 by Lashae Weber RN  Outcome: Ongoing, Progressing     Problem: Sleep Disordered Breathing (Heart Failure)  Goal: Effective Breathing Pattern During Sleep  1/18/2024 1413 by Lashae Weber RN  Outcome: Ongoing, Progressing  1/18/2024 1412 by Lashae Weber RN  Outcome: Ongoing, Progressing     Problem: Cardiac Output Decreased  Goal: Effective Cardiac Output  1/18/2024 1413 by Lashae Weber RN  Outcome: Ongoing, Progressing  1/18/2024 1412 by Lashae Weber RN  Outcome: Ongoing, Progressing     Problem: Fall Injury Risk  Goal: Absence of Fall and Fall-Related Injury  1/18/2024 1413 by Lashae Weber RN  Outcome: Ongoing, Progressing  1/18/2024 1412 by Lashae Weber RN  Outcome: Ongoing, Progressing     Problem: Coping Ineffective  Goal: Effective Coping  1/18/2024 1413 by Lashae Weber RN  Outcome: Ongoing, Progressing  1/18/2024 1412 by Lashae Weber RN  Outcome: Ongoing, Progressing     Problem: Skin Injury Risk Increased  Goal: Skin Health and Integrity  1/18/2024 1413 by Lashae Weber RN  Outcome: Ongoing, Progressing  1/18/2024 1412 by Lashae Weber RN  Outcome: Ongoing, Progressing     Problem: Adjustment to Device (Ventricular Assist Device)  Goal: Optimal Adjustment to Device  1/18/2024 1413 by Lashae Weber RN  Outcome: Ongoing, Progressing  1/18/2024 1412 by Lashae Weber RN  Outcome: Ongoing, Progressing     Problem: Bleeding (Ventricular Assist Device)  Goal: Absence of Bleeding  1/18/2024 1413 by Lashae Weber RN  Outcome: Ongoing, Progressing  1/18/2024 1412 by Lashae Weber RN  Outcome: Ongoing, Progressing     Problem: Embolism (Ventricular Assist Device)  Goal: Absence of Embolism Signs and Symptoms  1/18/2024 1413 by Lashae Weber RN  Outcome: Ongoing, Progressing  1/18/2024 1412 by Lashae Weber RN  Outcome: Ongoing, Progressing      Problem: Hemodynamic Instability (Ventricular Assist Device)  Goal: Optimal Blood Flow  1/18/2024 1413 by Lashae Weber RN  Outcome: Ongoing, Progressing  1/18/2024 1412 by Lashae Weber RN  Outcome: Ongoing, Progressing     Problem: Infection (Ventricular Assist Device)  Goal: Absence of Infection Signs and Symptoms  1/18/2024 1413 by Lashae Weber RN  Outcome: Ongoing, Progressing  1/18/2024 1412 by Lashae Weber RN  Outcome: Ongoing, Progressing     Problem: Right-Sided Heart Compromise (Ventricular Assist Device)  Goal: Effective Right-Sided Heart Function  1/18/2024 1413 by Lashae Weber RN  Outcome: Ongoing, Progressing  1/18/2024 1412 by Lashae Weber RN  Outcome: Ongoing, Progressing     Problem: Adjustment to Illness (Sepsis/Septic Shock)  Goal: Optimal Coping  1/18/2024 1413 by Lashae Weber RN  Outcome: Ongoing, Progressing  1/18/2024 1412 by Lashae Weber RN  Outcome: Ongoing, Progressing     Problem: Bleeding (Sepsis/Septic Shock)  Goal: Absence of Bleeding  1/18/2024 1413 by Lashae Weber RN  Outcome: Ongoing, Progressing  1/18/2024 1412 by Lashae Weber RN  Outcome: Ongoing, Progressing     Problem: Glycemic Control Impaired (Sepsis/Septic Shock)  Goal: Blood Glucose Level Within Desired Range  1/18/2024 1413 by Lashae Weber RN  Outcome: Ongoing, Progressing  1/18/2024 1412 by Lashae Weber RN  Outcome: Ongoing, Progressing     Problem: Infection Progression (Sepsis/Septic Shock)  Goal: Absence of Infection Signs and Symptoms  1/18/2024 1413 by Lashae Weber RN  Outcome: Ongoing, Progressing  1/18/2024 1412 by Lashae Weber RN  Outcome: Ongoing, Progressing     Problem: Nutrition Impaired (Sepsis/Septic Shock)  Goal: Optimal Nutrition Intake  1/18/2024 1413 by Lashae Weber RN  Outcome: Ongoing, Progressing  1/18/2024 1412 by Lashae Weber RN  Outcome: Ongoing, Progressing     Problem: Impaired Wound Healing  Goal: Optimal Wound Healing  1/18/2024 1413 by Gus,  KYAW Bhardwaj  Outcome: Ongoing, Progressing  1/18/2024 1412 by Lashae Weber RN  Outcome: Ongoing, Progressing     Problem: Device-Related Complication Risk (Hemodialysis)  Goal: Safe, Effective Therapy Delivery  1/18/2024 1413 by Lashae Weber RN  Outcome: Ongoing, Progressing  1/18/2024 1412 by Lashae Weber RN  Outcome: Ongoing, Progressing     Problem: Hemodynamic Instability (Hemodialysis)  Goal: Effective Tissue Perfusion  1/18/2024 1413 by Lashae Weber RN  Outcome: Ongoing, Progressing  1/18/2024 1412 by Lashae Weber RN  Outcome: Ongoing, Progressing     Problem: Infection (Hemodialysis)  Goal: Absence of Infection Signs and Symptoms  1/18/2024 1413 by Lashae Weber RN  Outcome: Ongoing, Progressing  1/18/2024 1412 by Lashae Weber RN  Outcome: Ongoing, Progressing

## 2024-01-18 NOTE — PT/OT/SLP PROGRESS
Occupational Therapy   Treatment    Name: Radha Abbott  MRN: [id number]  Admitting Diagnosis:  LVAD (left ventricular assist device) present  45 Days Post-Op    Recommendations:     Discharge Recommendations: High Intensity Therapy  Discharge Equipment Recommendations:  walker, rolling, wheelchair  Barriers to discharge:  None    Assessment:     Radha Abbott is a 63 y.o. male with a medical diagnosis of LVAD (left ventricular assist device) present.  He presents with the following performance deficits affecting function are weakness, impaired endurance, impaired self care skills, impaired functional mobility, gait instability, impaired balance, decreased coordination, decreased upper extremity function, decreased lower extremity function, decreased safety awareness, pain, decreased ROM, impaired coordination, impaired fine motor, impaired cardiopulmonary response to activity. Pt met seated on standard wheelchair and reports switching from wall to battery power with (A) from his spouse. Pt tolerated session well focusing on increasing independence with donning/doffing LVAD vest, managing LVAD equipment, and functional transfers. Pt continues to be most limited by generalized weakness, impaired tolerance to activity, and impaired gross/fine motor coordination. Pt would continue to benefit from skilled OT services to maximize functional independence with ADLs and functional mobility, reduce caregiver burden, and facilitate safe discharge in the least restrictive environment. Patient has demonstrated sufficient progression to warrant high intensity therapy evidenced by objectives noted below.     Patient demonstrates a mobility limitation that significantly impairs their ability to participate in one or more mobility related activities of daily living. Patient's mobility limitation cannot be sufficiently resolved with the use of a cane, but can be sufficiently resolved with the use of a rolling walker.The use of a rolling  walker will considerably improve their ability to participate in MRADLs. Patient will use the walker on a regular basis at home.      Patient has a mobility limitation that significantly impairs their ability to participate in one or more mobility related activities of daily living in customary locations in the home. The mobility limitation cannot be sufficiently resolved by the use of a cane or walker. The use of a manual wheelchair will greatly improve the patient's ability to participate in MRADLs. The patient will use the wheelchair on a regular basis at home. They have expressed their willingness to use a manual wheelchair in the home, and have a caregiver who is available and willing to assist with the wheelchair if needed.     Rehab Prognosis:  Good; patient would benefit from acute skilled OT services to address these deficits and reach maximum level of function.       Plan:     Patient to be seen 5 x/week to address the above listed problems via self-care/home management, therapeutic activities, therapeutic exercises, neuromuscular re-education  Plan of Care Expires: 02/03/24  Plan of Care Reviewed with: patient    Subjective     Chief Complaint: fatigue  Patient/Family Comments/goals: to get better  Pain/Comfort:  Pain Rating 1: other (see comments) (unrated)  Location - Orientation 1: generalized  Location 1:  (bottom)  Pain Addressed 1: Reposition, Distraction  Pain Rating Post-Intervention 1: other (see comments) (unrated)    Objective:     Communicated with: RN prior to session.  Patient found  sitting on wheelchair  with LVAD, telemetry upon OT entry to room.    General Precautions: Standard, fall, LVAD, contact    Orthopedic Precautions:N/A  Braces: N/A  Respiratory Status: Room air     Occupational Performance:     Bed Mobility:    Not assessed- pt met sitting in wheelchair beginning of session and left in wheelchair end of session    Functional Mobility/Transfers:  Patient completed Sit <> Stand  Transfer with minimum assistance and moderate assistance  with  rolling walker   2 x 4 reps from wheelchair  Prolonged rest break required due to fatigue  Functional Mobility: Pt tolerated static standing 5-15 seconds each trial with Min (A) for functional balance with RW    Activities of Daily Living:  Upper Body Dressing: moderate assistance to don/doff LVAD vest sitting in wheelchair; pt able to don/doff over b/l UE, manage velcro straps, and fasten waist buckle with verbal cueing      AMPA 6 Click ADL: 16    Treatment & Education:  -Education on VAD management including LVAD sternal precautions; proper anchor placement and driveline securement for DLES integrity; purpose of Self Test; identifying parts including controller, driveline, power cords, controller pouch, batteries, clips, consolidation bag, battery charger; pairing and rotating batteries; checking battery power; contents of emergency bag and need for having emergency bag when out of room/out of house; retrieving VAD numbers from controller; recording numbers in binder; transitioning to/from battery and wall power   -Education on energy conservation and task modification to maximize safety and (I) during ADLs and mobility  -Education on importance of OOB activity to improve overall activity tolerance and promote recovery  -Pt educated to call for assistance and to transfer with hospital staff only  -Provided education regarding role of OT, POC, & discharge recommendations with pt verbalizing understanding.  Pt had no further questions & when asked whether there were any concerns pt reported none.     Patient left  sitting in wheelchair  with all lines intact, call button in reach, and RN notified    GOALS:   Multidisciplinary Problems       Occupational Therapy Goals          Problem: Occupational Therapy    Goal Priority Disciplines Outcome Interventions   Occupational Therapy Goal     OT, PT/OT Ongoing, Progressing    Description: Goals to be met  by: 2/3/24    Patient will increase functional independence with ADLs by performing:    UB Dressing with SBA  LE Dressing with Supervision.  Grooming while standing at sink with Supervision.  Toileting from bedside commode with SBA for hygiene and clothing management.   Step transfer to bedside commode with SBA  [place] with VAD management during ADLs                             Time Tracking:     OT Date of Treatment: 01/18/24  OT Start Time: 0918  OT Stop Time: 0941  OT Total Time (min): 23 min    Billable Minutes:Self Care/Home Management 10  Therapeutic Activity 13    OT/PRIETO: OT     Number of PRIETO visits since last OT visit: 0    1/18/2024

## 2024-01-18 NOTE — ASSESSMENT & PLAN NOTE
- suspect acute tubular injury secondary to hypotension/cardiogenic shock likely compounded by intra-arterial contrast and anemia with urinary sediment with granular casts    -No acute indication for RRT   -Cystatin C - pending   -strict I/O's  -Continue to monitor for signs if renal recovery   -renal diet/tube feeds when not NPO, volume restriction per primary team  -renally all dose medications to eGFR  -avoid nephrotoxic agents wean feasible (i.e. NSAIDs, intra-arterial contrast, supra-therapeutic vancomycin levels, etc.)

## 2024-01-18 NOTE — PROGRESS NOTES
[facility] - Cardiology Stepdown  Heart Transplant  Progress Note    Patient Name: Radha Abbott  MRN: [id number]  Admission Date: 11/9/2023  Hospital Length of Stay: 70 days  Attending Physician: Sandhya Price MD  Primary Care Provider: Vasu Kong MD  Principal Problem:LVAD (left ventricular assist device) present    Subjective:   Interval History: NAEON. No acute complaints. Last HD session 1/10. Kidney function stable and appears euvolemic. Holding his coumadin.     Continuous Infusions:   sodium chloride 0.9%       Scheduled Meds:   acetaminophen  1,000 mg Per NG tube TID    alteplase  2 mg Intra-Catheter Once    amiodarone  400 mg Oral Daily    balsam peru-castor oiL   Topical (Top) BID    epoetin zohaib (PROCRIT) injection  50 Units/kg Subcutaneous Every Tues, Thurs, Sat    famotidine  20 mg Oral Daily    fluticasone propionate  2 spray Each Nostril Daily    gabapentin  100 mg Oral BID    mirtazapine  7.5 mg Oral Nightly    polyethylene glycol  17 g Oral Daily    psyllium husk (aspartame)  1 packet Oral Daily    senna-docusate 8.6-50 mg  2 tablet Oral Daily    traZODone  25 mg Oral QHS    venlafaxine  37.5 mg Oral Daily    vitamin D  1,000 Units Oral Daily     PRN Meds:0.9%  NaCl infusion (for blood administration), bisacodyL, dextrose 10%, dextrose 10%, dextrose 10%, dextrose 10%, glucagon (human recombinant), glucose, glucose, heparin (porcine), hydrALAZINE, insulin aspart U-100, ondansetron, Flushing PICC/Midline Protocol **AND** [DISCONTINUED] sodium chloride 0.9% **AND** sodium chloride 0.9%, traMADoL, zolpidem    Review of patient's allergies indicates:  No Known Allergies  Objective:     Vital Signs (Most Recent):  Temp: 98.1 °F (36.7 °C) (01/18/24 0755)  Pulse: 88 (01/18/24 0959)  Resp: 18 (01/18/24 0755)  BP: (!) 76/0 (01/18/24 0758)  SpO2: 95 % (01/18/24 0755) Vital Signs (24h Range):  Temp:  [97.1 °F (36.2 °C)-99 °F (37.2 °C)] 98.1 °F (36.7 °C)  Pulse:  [80-94] 88  Resp:  [16-19] 18  SpO2:   [95 %-100 %] 95 %  BP: ()/(0-66) 76/0     Patient Vitals for the past 72 hrs (Last 3 readings):   Weight   01/18/24 0755 71.7 kg (158 lb 1.1 oz)   01/17/24 0700 73.5 kg (162 lb 0.6 oz)   01/16/24 0838 70.9 kg (156 lb 4.9 oz)       Body mass index is 21.44 kg/m².      Intake/Output Summary (Last 24 hours) at 1/18/2024 1110  Last data filed at 1/18/2024 0905  Gross per 24 hour   Intake 1386 ml   Output 450 ml   Net 936 ml         Hemodynamic Parameters:       Telemetry: SR       Physical Exam  Constitutional:       Comments: Cachectic, temporal wasting   HENT:      Head: Normocephalic and atraumatic.   Eyes:      Conjunctiva/sclera: Conjunctivae normal.      Pupils: Pupils are equal, round, and reactive to light.   Neck:      Comments: Do not appreciate elevated JVP  Cardiovascular:      Rate and Rhythm: Normal rate and regular rhythm.      Comments: Smooth VAD hum  Pulmonary:      Effort: Pulmonary effort is normal.      Breath sounds: Normal breath sounds.   Abdominal:      General: Bowel sounds are normal.      Palpations: Abdomen is soft.   Musculoskeletal:         General: No swelling. Normal range of motion.      Cervical back: Normal range of motion and neck supple.   Skin:     General: Skin is warm and dry.      Capillary Refill: Capillary refill takes 2 to 3 seconds.   Neurological:      General: No focal deficit present.      Mental Status: He is alert and oriented to person, place, and time.            Significant Labs:  CBC:  Recent Labs   Lab 01/16/24  0244 01/17/24  0541 01/18/24  0305   WBC 6.15 6.36 5.76   RBC 2.41* 2.42* 2.41*   HGB 6.9* 6.9* 6.9*   HCT 22.6* 22.7* 23.1*    411 397   MCV 94 94 96   MCH 28.6 28.5 28.6   MCHC 30.5* 30.4* 29.9*       BNP:  Recent Labs   Lab 01/12/24  0535 01/15/24  0315 01/17/24  0541   * 748* 906*       CMP:  Recent Labs   Lab 01/16/24  0244 01/17/24  0541 01/18/24  0305   GLU 89 78 77   CALCIUM 8.6* 8.7 8.6*   ALBUMIN 2.0* 2.0* 2.0*   PROT 7.0 7.0  6.8    136 138   K 4.9 5.0 4.9   CO2 22* 20* 20*    106 107   BUN 90* 81* 75*   CREATININE 4.6* 4.3* 4.2*   ALKPHOS 138* 139* 124   ALT 37 38 36   AST 43* 36 33   BILITOT 0.2 0.2 0.2        Coagulation:   Recent Labs   Lab 01/16/24  0244 01/17/24  0541 01/18/24  0305   INR 2.7* 3.2* 4.0*       LDH:  Recent Labs   Lab 01/16/24  0244 01/17/24  0541 01/18/24  0305   * 304* 294*       Microbiology:  Microbiology Results (last 7 days)       Procedure Component Value Units Date/Time    AFB Culture & Smear [[id number]] Collected: 12/06/23 1532    Order Status: Completed Specimen: Body Fluid from Lung, Right Updated: 01/18/24 0928     AFB Culture & Smear Culture in progress      Culture in progress     AFB CULTURE STAIN No acid fast bacilli seen.    Narrative:      Bronchial Wash            I have reviewed all pertinent labs within the past 24 hours.    Estimated Creatinine Clearance: 18.7 mL/min (A) (based on SCr of 4.2 mg/dL (H)).    Diagnostic Results:  I have reviewed and interpreted all pertinent imaging results/findings within the past 24 hours.  Assessment and Plan:     63 year old male with hx of CAD s/p 3v CABG (unclear anatomy, 2009), ICM with a recent EF of 15-20% s/p ICD (medtronik 2009), DM2 (a1c 7.7), HTN, HLD, Vfib on amio who presents to the ED with CC of SOB     Pt was recently admitted to [facility] as a transfer.  He was started on a Lasix gtt and did well.  Started on gDMT and discharged home on  5 with plans to follow up in HTS clinic for ongoing transplant evaluation at another facility.  He says that about a few days ago he started to notice LE swelling.  This turned into Nelson and orthopnea.  He says he can't walk to the bathroom without getting SOB.  He also complains of weight gain.  He takes torsemide 20mg BID at home and was told to trial additional Lasix which he did without any improvement.  He was rx metolazone but has not been filled.  He came to the ED     In the ED he was AF  with stable VS on RA.  CBC showing chronic anemia.  CMP notable for acute on chronic CKD with baseline around 2.1 and Cr now 2.6.  BNP elevated.  Lactate neg.  CXR showing pulmonary edema.  I evaluated the pt at the bedside.  Bedside TTE showing CVP >15.  He was subsequently admitted to the CCU for diuresis.     He was continued on his home  and was started on a lasix gtt, which he responded well to overnight (net -1700cc. He feels much better this morning as well. Our transplant coordinators have been working on insurance approval and he is now being transferred to [facility] service for transplant evaluation.     * LVAD (left ventricular assist device) present  -HeartMate 3 Implanted  12/1/2023  as DT  -HTS Primary  -Implanted by Dr. Washington  -Continue Coumadin, dosing by PharmD.  Goal INR 2.0-3.0 . Subtherapeutic today.   -Antiplatelets Not on ASA  -LDH is stable overall today. Will continue to monitor daily.  -Weaned off midodrine.  -Speed set at  4900   rpm, LSL 4500 rpm   -Interrogation notable for no events  -ECHO 1/2/24 mild TR, LVEDD 6.25 with HM3 set to 4900 rpm   -Not listed for OHTx, declined for OHTX due to comorbidities   -PT/OT/Speech. Recommending rehab but HD + LVAD may be barrier      Procedure: Device Interrogation Including analysis of device parameters  Current Settings: Ventricular Assist Device  Review of device function is stable/unstable stable        1/18/2024     7:59 AM 1/18/2024     3:14 AM 1/17/2024    11:39 PM 1/17/2024     8:27 PM 1/17/2024     3:51 PM 1/17/2024    11:50 AM 1/17/2024     8:24 AM   TXP LVAD INTERROGATIONS   Type HeartMate3 HeartMate3 HeartMate3 HeartMate3 HeartMate3 HeartMate3 HeartMate3   Flow 4.1 3.7 3.5 3.8 3.8 3.8 3.6   Speed 4900 4900 4950 4900 4900 4900 4900   PI 3.6 5.8 6.8 5.2 5.2 5.4 6   Power ([place]) 3.3 3.2 3.2 3.2 3.4 3.3 3.3   LSL 4500  4500 4500 4500 4500 4500   Low Flow Alarm no    no     High Power Alarm no    no     Pulsatility Intermittent pulse   Intermittent pulse Intermittent pulse Intermittent pulse Intermittent pulse Intermittent pulse         Right parotid mass  - 2.2cm R. Parotid mass noted.   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 Can follow up as outpatient w/ ENT for FNA. No further interventions needed as inpatient.     Lymphadenopathy  +ill contacts  Respiratory panel negative  Strep test negative.   Throat culture showing pseudomonas. As per ID , will treat w/ 5 day course of zosyn (therapy completed 1/10)    Unilateral complete vocal fold paralysis  -Appreciate ENT's help  -S/P augmentation of paralyzed left vocal cord 12/18    Dysphonia  -Speech following closely  -Appreciate ENT's help. S/P augmentation of paralyzed left vocal cord 12/18    Moderate malnutrition  -RD and SLP following  -Tolerating tube feeds. Total daily caloric intake increased to 2160  -Failed MBBS 12/20, continue TF.  -Speech following, appreciate their continued recommendations     Depressed mood  -Psychiatry consulted for depressed mood, SI when left alone  -Recommend sitter when alone (possibly with transition to stepdown).  -Psych reconsulted 1/2, agree with venlafaxine and trazadone    IVÁN (acute kidney injury)  -Nephrology following      Acute on chronic combined systolic and diastolic CHF, NYHA class 4  Pt with known ICM with an EF of 25% on home  presented with decompensated HF.    -S/P LVAD    PAF (paroxysmal atrial fibrillation)  Known hx of pAF. In sinus rhythm on admission, but 1 run of AF RVR overnight. He spontaneously converted.  - Continue home amiodarone 400mg qd  - Continue AC, on hold with supratherapeutic INR       Acute renal failure with acute tubular necrosis superimposed on stage 3b chronic kidney disease  IVÁN on CKD2. Baseline Cr of 1.7-2.0.   - Hold ARNi  -Trend BMPs daily   -Nephrology following   -SLED/SCUF for volume removal began on 12/1. He is anuric  -Did not respond to diuretic challenge with 240 mg IV Lasix, 1000 mg  Diuril, and 500 mg IV Diamox done 12/14  -Diuretic challenge 1/8 with 160 mg IVP Lasix,   -total  cc/24 hours   -Now on HD TThSat; but last HD was on 1/10  -Tunneled trialysis line placed 12/22 by IR    Type 2 diabetes mellitus without complication, without long-term current use of insulin  -A1c 7.6, not insulin dependent  - Endocrine following, appreciate assistance with blood glucose management    CAD (coronary artery disease)  -S/p 3vCABG in 2009  -Lipitor on hold 2/2 mild transaminitis  -Not on ASA post VAD    Ventricular tachycardia  Hx VT s/p ICD placement (medtronic 2009)  - Continue home amio 400mg qd        [facility]  Heart Transplant  [facility] - Cardiology Stepdown     Cardiac

## 2024-01-18 NOTE — CONSULTS
[facility] - Cardiology Stepdown  Physical Medicine & Rehab  Consult Note    Patient Name: Radha Abbott  MRN: [id number]  Admission Date: 11/9/2023  Hospital Length of Stay: 70 days  Attending Physician: Sandhya Price MD     Inpatient consult to Physical Medicine & Rehabilitation  Consult performed by: Layne Ramirez NP  Consult requested by:  Sandhya Price MD    Collaborating Physician: Beth Saha MD  Reason for Consult:  Assess rehabilitation needs      Consults  Subjective:     Principal Problem: LVAD (left ventricular assist device) present    HPI: Radha Abbott is a 63-year-old male with PMHx of CAD s/p 3v CABG (unclear anatomy, 2009), ICM with a recent EF of 15-20% s/p ICD (medtronik 2009), DM2 (a1c 7.7), HTN, HLD, Vfib on amio. Patient presented to [facility] on for SOB. On arrival, started on lasix drip. Started on gDMT and discharged home on  5 with plans to follow up in [facility] [place] clinic for ongoing transplant evaluation at another facility. Presented back to [facility] on 11/9/23 for worsening SOB and weight gain. S/p LVAD 12/1/23. Continues Coumadin. Goal INR 2-3. Weaned off Midodrine. Not listed for OHTx, declined for OHTX due to comorbidities. Hospital course complicated by acute renal failure (Nephrology following and suspect acute tubular injury secondary to hypotension/cardiogenic shock likely compounded by intra-arterial contrast and anemia with urinary sediment with granular casts. Was on HD, last HD 1/10/23), ID consulted and per ID unclear significance of the pseudomonas culture but given his symptoms complete pip-tazo 4.5 q12 hours for 5 day course, EOC 1/10/24, hx of Vt (continues on home amio).     Functional History: Patient lives in [place] with wife in a single story home with 1 step to enter.  Prior to admission, I. DME: none.     Hospital Course: 1/17/24: Participated w/ PT. Ambulated 172' w/ RW and 5 seated rest breaks. Pt requires Yonis when beginning to ambulate but progress to requiring modA  with each trial 2/2 as he fatigues he begins pushing RW too far out infront of him, leaning forward excessively, and increases gina to try and finish walking faster. Pt had 1 LOB during gait training requiring maxA to recover balance. Chair follow utilized for pt safety.     Past Medical History:   Diagnosis Date    CAD (coronary artery disease)     Diabetes mellitus     HFrEF (heart failure with reduced ejection fraction)     ICD (implantable cardioverter-defibrillator) in place     MI, old      Past Surgical History:   Procedure Laterality Date    ANGIOPLASTY-VENOUS ARTERY Right 12/1/2023    Procedure: ANGIOPLASTY-VENOUS ARTERY, RIGHT FEMORAL;  Surgeon: Yuri Washington MD;  Location: [address];  Service: Cardiovascular;  Laterality: Right;    AORTIC VALVULOPLASTY N/A 12/1/2023    Procedure: REPAIR, AORTIC VALVE;  Surgeon: Yuri Washington MD;  Location: [address];  Service: Cardiovascular;  Laterality: N/A;    CARDIAC SURGERY      CLOSURE N/A 12/1/2023    Procedure: CLOSURE, TEMPORARY;  Surgeon: Yuri Washington MD;  Location: [address];  Service: Cardiovascular;  Laterality: N/A;    DRAINAGE OF PLEURAL EFFUSION  12/4/2023    Procedure: DRAINAGE, PLEURAL EFFUSION;  Surgeon: Yuri Washington MD;  Location: [facility] OR [address] [place] [address];  Service: Cardiovascular;;    INSERTION OF GRAFT TO PERICARDIUM  12/4/2023    Procedure: INSERTION, GRAFT, PERICARDIUM;  Surgeon: Yuri Washington MD;  Location: [facility] OR [address] [place] [address];  Service: Cardiovascular;;    INSERTION OF INTRA-AORTIC BALLOON ASSIST DEVICE Right 11/21/2023    Procedure: INSERTION, INTRA-AORTIC BALLOON PUMP;  Surgeon: Finn Cohn MD;  Location: [facility] CATH LAB;  Service: Cardiology;  Laterality: Right;    LEFT VENTRICULAR ASSIST DEVICE Left 12/1/2023    Procedure: INSERTION-LEFT VENTRICULAR ASSIST DEVICE;  Surgeon: Yuri Washington MD;  Location: [address];  Service: Cardiovascular;  Laterality: Left;  REDO STERNOTOMY - REDO SAW NEEDED FOR CASE     LYSIS OF ADHESIONS  12/1/2023    Procedure: LYSIS, ADHESIONS;  Surgeon: Yuri Washington MD;  Location: [facility] OR [facility]LR;  Service: Cardiovascular;;    PLACEMENT OF SWAN ROLANDO CATHETER WITH IMAGING GUIDANCE  11/20/2023    Procedure: INSERTION, CATHETER, SWAN-ROLANDO, WITH IMAGING GUIDANCE;  Surgeon: Sajan Hurley MD;  Location: [facility] CATH LAB;  Service: Cardiology;;    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 12/1/2023    Procedure: REPAIR, ANEURYSM, ARTERY, FEMORAL;  Surgeon: Yuri Washington MD;  Location: [facility] OR [facility] FLR;  Service: Cardiovascular;  Laterality: Right;  Right Femoral Artery Repair    RIGHT HEART CATHETERIZATION Right 10/10/2023    Procedure: INSERTION, CATHETER, RIGHT HEART;  Surgeon: Bin Gandhi MD;  Location: [facility] CATH LAB;  Service: Cardiology;  Laterality: Right;    RIGHT HEART CATHETERIZATION Right 10/13/2023    Procedure: INSERTION, CATHETER, RIGHT HEART;  Surgeon: Walter Mcintyre MD;  Location: [facility] CATH LAB;  Service: Cardiology;  Laterality: Right;    RIGHT HEART CATHETERIZATION  11/13/2023    RIGHT HEART CATHETERIZATION Right 11/13/2023    Procedure: INSERTION, CATHETER, RIGHT HEART;  Surgeon: Juventino Bermudez Jr., MD;  Location: [facility] CATH LAB;  Service: Cardiology;  Laterality: Right;    RIGHT HEART CATHETERIZATION Right 11/20/2023    Procedure: INSERTION, CATHETER, RIGHT HEART;  Surgeon: Sajan Hurley MD;  Location: [facility] CATH LAB;  Service: Cardiology;  Laterality: Right;    STERNAL WOUND CLOSURE N/A 12/4/2023    Procedure: CLOSURE, WOUND, STERNUM;  Surgeon: Yuri Washington MD;  Location: [facility] OR [facility];  Service: Cardiovascular;  Laterality: N/A;    STERNOTOMY N/A 12/1/2023    Procedure: STERNOTOMY, REDO;  Surgeon: Yuri Washington MD;  Location: [facility] OR 2ND FLR;  Service: Cardiovascular;  Laterality: N/A;    VALVULOPLASTY, MITRAL VALVE N/A 12/1/2023    Procedure: VALVULOPLASTY, MITRAL VALVE;  Surgeon: Yuri Washington MD;  Location: [facility] OR [facility];  Service:  Cardiovascular;  Laterality: N/A;     Review of patient's allergies indicates:  No Known Allergies    Scheduled Medications:    acetaminophen  1,000 mg Per NG tube TID    alteplase  2 mg Intra-Catheter Once    amiodarone  400 mg Oral Daily    balsam peru-castor oiL   Topical (Top) BID    epoetin zohaib (PROCRIT) injection  50 Units/kg Subcutaneous Every Tues, Thurs, Sat    famotidine  20 mg Oral Daily    fluticasone propionate  2 spray Each Nostril Daily    gabapentin  100 mg Oral BID    mirtazapine  7.5 mg Oral Nightly    polyethylene glycol  17 g Oral Daily    psyllium husk (aspartame)  1 packet Oral Daily    senna-docusate 8.6-50 mg  2 tablet Oral Daily    traZODone  25 mg Oral QHS    venlafaxine  37.5 mg Oral Daily    vitamin D  1,000 Units Oral Daily       PRN Medications: 0.9%  NaCl infusion (for blood administration), bisacodyL, dextrose 10%, dextrose 10%, dextrose 10%, dextrose 10%, glucagon (human recombinant), glucose, glucose, heparin (porcine), hydrALAZINE, insulin aspart U-100, ondansetron, Flushing PICC/Midline Protocol **AND** [DISCONTINUED] sodium chloride 0.9% **AND** sodium chloride 0.9%, traMADoL, zolpidem    Family History    None       Tobacco Use    Smoking status: Every Day     Current packs/day: 0.50     Types: Cigarettes    Smokeless tobacco: Not on file   Substance and Sexual Activity    Alcohol use: Yes     Comment: rarely    Drug use: No    Sexual activity: Not on file     Review of Systems   Constitutional:  Positive for activity change. Negative for fatigue.   Respiratory:  Negative for cough and shortness of breath.    Gastrointestinal:  Negative for abdominal pain.   Musculoskeletal:  Positive for gait problem. Negative for arthralgias and back pain.   Neurological:  Positive for weakness.   Psychiatric/Behavioral:  Negative for agitation, behavioral problems and confusion.      Objective:     Vital Signs (Most Recent):  Temp: 97.5 °F (36.4 °C) (01/18/24 1120)  Pulse: 82 (01/18/24  1215)  Resp: 18 (01/18/24 1120)  BP: (!) 88/0 (01/18/24 1215)  SpO2: (!) 94 % (01/18/24 1215)    Vital Signs (24h Range):  Temp:  [97.1 °F (36.2 °C)-99 °F (37.2 °C)] 97.5 °F (36.4 °C)  Pulse:  [80-91] 82  Resp:  [16-19] 18  SpO2:  [90 %-100 %] 94 %  BP: ()/(0-66) 88/0     Body mass index is 21.44 kg/m².     Physical Exam  Vitals and nursing note reviewed.   HENT:      Nose: Nose normal.      Mouth/Throat:      Mouth: Mucous membranes are moist.   Eyes:      Extraocular Movements: Extraocular movements intact.      Pupils: Pupils are equal, round, and reactive to light.   Cardiovascular:      Comments: LVAD  Pulmonary:      Effort: Pulmonary effort is normal. No respiratory distress.   Musculoskeletal:      Comments: Deconditioned and generalized weakness   Skin:     General: Skin is warm.   Neurological:      Mental Status: He is alert and oriented to person, place, and time. Mental status is at baseline.      Motor: Weakness present.      Gait: Gait abnormal.   Psychiatric:         Mood and Affect: Mood normal.         Behavior: Behavior normal.     Diagnostic Results:   Labs: Reviewed  ECG: Reviewed  CT: Reviewed    Assessment/Plan:     * LVAD (left ventricular assist device) present  - S/p LVAD 12/1/23.  -  Continues Coumadin. Goal INR 2-3. Weaned off Midodrine.   - Not listed for OHTx, declined for OHTX due to comorbidities.    Impaired mobility  - Related to prolonged/acute hospital course.     Recommendations  -  Encourage mobility, OOB in chair at least 3 hours per day, and early ambulation as appropriate  -  PT/OT evaluate and treat  -  Pain management  -  Monitor for and prevent skin breakdown and pressure ulcers  Early mobility, repositioning/weight shifting every 20-30 minutes when sitting, turn patient every 2 hours, proper mattress/overlay and chair cushioning, pressure relief/heel protector boots  -  DVT prophylaxis    -  Reviewed discharge options (IP rehab, SNF, HH therapy, and OP therapy)      Acute renal failure with acute tubular necrosis superimposed on stage 3b chronic kidney disease  - Nephrology following and suspect acute tubular injury secondary to hypotension/cardiogenic shock likely compounded by intra-arterial contrast and anemia with urinary sediment with granular casts. Was on HD, last HD 1/10/23    Ventricular tachycardia  - continues on home amio    PM&R Recommendation:     The PM&R team has reviewed this patient's ongoing medical case including inpatient diagnosis, medical history, clinical examination, labs, vitals, current social and functional history. At this time, we will continue to follow as a rehab candidate. Continues to have goals for rehab.    Thank you for your consult.     Layne Ramirez NP  Department of Physical Medicine & Rehab  [facility] - Cardiology Stepdown

## 2024-01-18 NOTE — ASSESSMENT & PLAN NOTE
- Nephrology following and suspect acute tubular injury secondary to hypotension/cardiogenic shock likely compounded by intra-arterial contrast and anemia with urinary sediment with granular casts. Was on HD, last HD 1/10/23

## 2024-01-18 NOTE — SUBJECTIVE & OBJECTIVE
Interval History: Scr 4.2 from 4.3 yesterday. Pt reports increase UOP. Not requiring diuretics. Appears euvolemic. Cystatin C pending.     Review of patient's allergies indicates:  No Known Allergies  Current Facility-Administered Medications   Medication Frequency    0.9%  NaCl infusion (for blood administration) Q24H PRN    0.9%  NaCl infusion Continuous    acetaminophen tablet 1,000 mg TID    alteplase injection 2 mg Once    amiodarone tablet 400 mg Daily    balsam peru-castor oiL Oint BID    bisacodyL suppository 10 mg Daily PRN    dextrose 10% bolus 125 mL 125 mL PRN    dextrose 10% bolus 125 mL 125 mL PRN    dextrose 10% bolus 250 mL 250 mL PRN    dextrose 10% bolus 250 mL 250 mL PRN    epoetin zohaib injection 8,100 Units Every Tues, Thurs, Sat    famotidine tablet 20 mg Daily    fluticasone propionate 50 mcg/actuation nasal spray 100 mcg Daily    gabapentin capsule 100 mg BID    glucagon (human recombinant) injection 1 mg PRN    glucose chewable tablet 16 g PRN    glucose chewable tablet 24 g PRN    heparin (porcine) injection 1,000 Units PRN    hydrALAZINE injection 10 mg Q6H PRN    insulin aspart U-100 pen 0-5 Units QID (AC + HS) PRN    mirtazapine tablet 7.5 mg Nightly    ondansetron injection 4 mg Q6H PRN    polyethylene glycol packet 17 g Daily    psyllium husk (aspartame) 3.4 gram packet 1 packet Daily    senna-docusate 8.6-50 mg per tablet 2 tablet Daily    sodium chloride 0.9% flush 10 mL PRN    traMADoL tablet 50 mg Q8H PRN    trazodone split tablet 25 mg QHS    venlafaxine tablet 37.5 mg Daily    vitamin D 1000 units tablet 1,000 Units Daily    zolpidem tablet 5 mg Nightly PRN       Objective:     Vital Signs (Most Recent):  Temp: 97.5 °F (36.4 °C) (01/18/24 1120)  Pulse: 82 (01/18/24 1215)  Resp: 18 (01/18/24 1120)  BP: (!) 88/0 (01/18/24 1215)  SpO2: (!) 94 % (01/18/24 1215) Vital Signs (24h Range):  Temp:  [97.1 °F (36.2 °C)-99 °F (37.2 °C)] 97.5 °F (36.4 °C)  Pulse:  [80-91] 82  Resp:  [16-19]  18  SpO2:  [90 %-100 %] 94 %  BP: ()/(0-66) 88/0     Weight: 71.7 kg (158 lb 1.1 oz) (01/18/24 0755)  Body mass index is 21.44 kg/m².  Body surface area is 1.91 meters squared.    I/O last 3 completed shifts:  In: 1229 [P.O.:1229]  Out: 725 [Urine:725]     Physical Exam  Vitals and nursing note reviewed.   Constitutional:       Comments: Cachectic, temporal wasting   HENT:      Head: Normocephalic and atraumatic.   Eyes:      Conjunctiva/sclera: Conjunctivae normal.      Pupils: Pupils are equal, round, and reactive to light.   Neck:      Comments: Do not appreciate elevated JVP  Cardiovascular:      Rate and Rhythm: Normal rate and regular rhythm.      Comments: LVAD  Pulmonary:      Effort: Pulmonary effort is normal.      Breath sounds: Normal breath sounds.   Abdominal:      General: Bowel sounds are normal.      Palpations: Abdomen is soft.   Musculoskeletal:         General: No swelling. Normal range of motion.      Cervical back: Normal range of motion and neck supple.   Skin:     General: Skin is warm and dry.      Capillary Refill: Capillary refill takes 2 to 3 seconds.   Neurological:      General: No focal deficit present.      Mental Status: He is alert and oriented to person, place, and time.          Significant Labs:  CBC:   Recent Labs   Lab 01/18/24  0305   WBC 5.76   RBC 2.41*   HGB 6.9*   HCT 23.1*      MCV 96   MCH 28.6   MCHC 29.9*     CMP:   Recent Labs   Lab 01/18/24  0305   GLU 77   CALCIUM 8.6*   ALBUMIN 2.0*   PROT 6.8      K 4.9   CO2 20*      BUN 75*   CREATININE 4.2*   ALKPHOS 124   ALT 36   AST 33   BILITOT 0.2     All labs within the past 24 hours have been reviewed.

## 2024-01-18 NOTE — PT/OT/SLP PROGRESS
Physical Therapy Treatment    Patient Name:  Radha Abbott   MRN:  [id number]  Admitting Diagnosis:  LVAD (left ventricular assist device) present   Recent Surgery: Procedure(s) (LRB):  CLOSURE, WOUND, STERNUM (N/A)  INSERTION, GRAFT, PERICARDIUM  DRAINAGE, PLEURAL EFFUSION 45 Days Post-Op  Admit Date: 11/9/2023  Length of Stay: 70 days    Recommendations:     Discharge Recommendations:    High Intensity Therapy   Discharge Equipment Recommendations: walker, rolling, wheelchair   Barriers to discharge: Decreased caregiver support and increased need for caregiver assistance      Plan:     During this hospitalization, patient to be seen 5 x/week to address the identified rehab impairments via gait training, therapeutic activities, therapeutic exercises, neuromuscular re-education and progress towards the established goals.  Plan of Care Expires:  02/05/24  Plan of Care Reviewed with: patient, spouse    Assessment:     Radha Abbott is a 63 y.o. male admitted with a medical diagnosis of LVAD (left ventricular assist device) present. Pt found sitting in w/c agreeable to therapy session. Pt with reports of increased R hip pain during session limiting distance gait trained. Patient also with increased fatigue with use of RW compared to PRW used previously. Pt required increased VC/TC this session for stand<>sit transitions as with fatigue pt attempts to sit prematurely. Patient is not functioning at his baseline PLOF and is a fall risk. Patient would benefit from skilled therapy services to maximize safety and independence, increase activity tolerance, decrease fall risk, decrease caregiver burden, improve QOL, improve patient's functional mobility, and decrease risk of contractures and pressure sores. Patient has demonstrated sufficient progression to warrant high intensity therapy evidenced by objectives noted below.    Problem List: weakness, impaired endurance, impaired self care skills, impaired functional mobility, gait  instability, impaired balance, decreased upper extremity function, decreased lower extremity function, decreased coordination, decreased safety awareness, impaired cardiopulmonary response to activity, pain.  Rehab Prognosis: Good; patient would benefit from acute skilled PT services to address these deficits and reach maximum level of function.      Goals:   Multidisciplinary Problems       Physical Therapy Goals          Problem: Physical Therapy    Goal Priority Disciplines Outcome Goal Variances Interventions   Physical Therapy Goal     PT, PT/OT Ongoing, Progressing     Description: Goals to be met by: 23     Patient will increase functional independence with mobility by performin. Sit to stand transfer with modified independence  2. Bed to chair transfer with supervision  3. Gait  x 150 feet with supervision using physician approved AD with standing rest breaks prn.   4. Lower extremity exercise program x30 reps per handout, with independence  5. Recite 3/3 sternal precautions and remain complaint with precautions throughout session with no verbal cues                     Multidisciplinary Problems (Resolved)          Problem: Physical Therapy    Goal Priority Disciplines Outcome Goal Variances Interventions   Physical Therapy Goal   (Resolved)     PT, PT/OT Met     Description: Goals to be met by: 23     Patient will increase functional independence with mobility by performin. Evaluate pt's need for physical therapy.                          Subjective     RN notified prior to session. Wife present upon PT entrance into room. Patient agreeable to PT treatment session.    Chief Complaint: R hip pain  Patient/Family Comments/goals: increase mobility   Pain/Comfort:  Pain Rating 1: other (see comments) (unrated)  Location - Side 1: Left  Location - Orientation 1: posterior  Location 1: hip  Pain Addressed 1: Reposition, Distraction  Pain Rating Post-Intervention 1:   (unrated)      Objective:     Additional staff present: Rehab Tech ([place])    Patient found  sitting in w/c  with: LVAD, telemetry   Cognition:   Alert and Cooperative  Patient is oriented to Person, Place, Time, Situation  General Precautions: Standard, Cardiac fall, LVAD   Orthopedic Precautions:N/A   Braces: N/A   Body mass index is 21.44 kg/m².  Oxygen Device: Room Air  Vitals: BP (!) 76/0 (BP Location: Left arm, Patient Position: Sitting)   Pulse 81   Temp 97.5 °F (36.4 °C) (Tympanic)   Resp 18   Ht 6' (1.829 m)   Wt 71.7 kg (158 lb 1.1 oz)   SpO2 (!) 90%   BMI 21.44 kg/m²     Outcome Measures:  AM-PAC 6 CLICK MOBILITY  Turning over in bed (including adjusting bedclothes, sheets and blankets)?: 3  Sitting down on and standing up from a chair with arms (e.g., wheelchair, bedside commode, etc.): 3  Moving from lying on back to sitting on the side of the bed?: 3  Moving to and from a bed to a chair (including a wheelchair)?: 3  Need to walk in hospital room?: 3  Climbing 3-5 steps with a railing?: 2  Basic Mobility Total Score: 17     Functional Mobility:    Bed Mobility:   Pt found/returned to chair    Transfers:   Sit <> Stand Transfer: minimum assistance with rolling walker x2 attempts  Stand <> Sit Transfer: moderate assistance with rolling walker x2 attempts  With fatigue at end of gait trial pt attempting to sit in chair prematurely with RW too far in front of him resulting in increased fwd lean and increased hip flexion. PT provided VC/TC for safety to bring RW back in front of pt before sitting in chair; pt verbalized understanding after first attempt but did not respond to cueing at second attempt.     Balance:  Standing:  Static: minimum assistance  Dynamic: minimum assistance and moderate assistance      Gait:  Patient ambulated: 52', 42' seated break between   Patient required: minimal assist and moderate assist  Patient used:  rolling walker   Gait Deviation(s): occasional unsteady gait,  decreased step length, narrow base of support, flexed posture, decreased gina, and increased fwd lean onto RW< ambulating outside [place] of RW  all lines remained intact throughout ambulation trial  Chair follow for patient safety  LVAD: Consolidation vest utilized  LVAD: Emergency bag present throughout ambulation trial out of room  Comments: Patient initially with upright stance during trial but with fatigue pt with increasing fwd lean onto RW, ambulating with RW too far fwd, and increased hip and knee flexion with fatigue. Pt reporting increased R hip pain after second trial limiting further mobility.     Education:  Time provided for education, counseling and discussion of health disposition in regards to patient's current status  All questions answered within PT scope of practice and to patient's satisfaction  PT role in POC to address current functional deficits  Pt educated on proper body mechanics, safety techniques, and energy conservation with PT facilitation and cueing throughout session  Call nursing/pct to transfer to chair/use bathroom. Pt stated understanding.  LVAD: patient found on battery power / returned on battery power. No alarms sounded.     Patient left up in chair with all lines intact, call button in reach, and wife present.    Time Tracking:     PT Received On: 01/18/24  PT Start Time: 1053     PT Stop Time: 1107  PT Total Time (min): 14 min     Billable Minutes:   Gait Training 14 minutes    Treatment Type: Treatment  PT/PTA: PT       1/18/2024

## 2024-01-19 PROBLEM — E87.5 HYPERKALEMIA: Status: ACTIVE | Noted: 2024-01-19

## 2024-01-19 LAB
ALBUMIN SERPL BCP-MCNC: 2.2 G/DL (ref 3.5–5.2)
ALP SERPL-CCNC: 134 U/L (ref 55–135)
ALT SERPL W/O P-5'-P-CCNC: 31 U/L (ref 10–44)
ANION GAP SERPL CALC-SCNC: 8 MMOL/L (ref 8–16)
AST SERPL-CCNC: 25 U/L (ref 10–40)
BASOPHILS # BLD AUTO: 0.07 K/UL (ref 0–0.2)
BASOPHILS NFR BLD: 0.9 % (ref 0–1.9)
BILIRUB DIRECT SERPL-MCNC: 0.1 MG/DL (ref 0.1–0.3)
BILIRUB SERPL-MCNC: 0.3 MG/DL (ref 0.1–1)
BNP SERPL-MCNC: 1278 PG/ML (ref 0–99)
BUN SERPL-MCNC: 72 MG/DL (ref 8–23)
CA-I BLDV-SCNC: 1.19 MMOL/L (ref 1.06–1.42)
CALCIUM SERPL-MCNC: 8.9 MG/DL (ref 8.7–10.5)
CHLORIDE SERPL-SCNC: 106 MMOL/L (ref 95–110)
CO2 SERPL-SCNC: 21 MMOL/L (ref 23–29)
CREAT SERPL-MCNC: 3.9 MG/DL (ref 0.5–1.4)
CRP SERPL-MCNC: 65 MG/L (ref 0–8.2)
CYSTATIN C SERPL-MCNC: 3.92 MG/L (ref 0.67–1.21)
DIFFERENTIAL METHOD BLD: ABNORMAL
EOSINOPHIL # BLD AUTO: 0.3 K/UL (ref 0–0.5)
EOSINOPHIL NFR BLD: 3.5 % (ref 0–8)
ERYTHROCYTE [DISTWIDTH] IN BLOOD BY AUTOMATED COUNT: 17.2 % (ref 11.5–14.5)
EST. GFR  (NO RACE VARIABLE): 16.7 ML/MIN/1.73 M^2
GFR/BSA.PRED SERPLBLD CYS-BASED-ARV: 13 ML/MIN/BSA
GLUCOSE SERPL-MCNC: 82 MG/DL (ref 70–110)
HCT VFR BLD AUTO: 25.9 % (ref 40–54)
HGB BLD-MCNC: 7.5 G/DL (ref 14–18)
IMM GRANULOCYTES # BLD AUTO: 0.04 K/UL (ref 0–0.04)
IMM GRANULOCYTES NFR BLD AUTO: 0.5 % (ref 0–0.5)
INR PPP: 3.9 (ref 0.8–1.2)
LDH SERPL L TO P-CCNC: 323 U/L (ref 110–260)
LYMPHOCYTES # BLD AUTO: 1 K/UL (ref 1–4.8)
LYMPHOCYTES NFR BLD: 12.7 % (ref 18–48)
MAGNESIUM SERPL-MCNC: 1.8 MG/DL (ref 1.6–2.6)
MCH RBC QN AUTO: 28.2 PG (ref 27–31)
MCHC RBC AUTO-ENTMCNC: 29 G/DL (ref 32–36)
MCV RBC AUTO: 97 FL (ref 82–98)
MONOCYTES # BLD AUTO: 0.6 K/UL (ref 0.3–1)
MONOCYTES NFR BLD: 7.9 % (ref 4–15)
NEUTROPHILS # BLD AUTO: 5.7 K/UL (ref 1.8–7.7)
NEUTROPHILS NFR BLD: 74.5 % (ref 38–73)
NRBC BLD-RTO: 0 /100 WBC
PHOSPHATE SERPL-MCNC: 4.1 MG/DL (ref 2.7–4.5)
PLATELET # BLD AUTO: 473 K/UL (ref 150–450)
PMV BLD AUTO: 9 FL (ref 9.2–12.9)
POCT GLUCOSE: 148 MG/DL (ref 70–110)
POCT GLUCOSE: 157 MG/DL (ref 70–110)
POCT GLUCOSE: 85 MG/DL (ref 70–110)
POCT GLUCOSE: 93 MG/DL (ref 70–110)
POTASSIUM SERPL-SCNC: 5.4 MMOL/L (ref 3.5–5.1)
PREALB SERPL-MCNC: 36 MG/DL (ref 20–43)
PROT SERPL-MCNC: 7.3 G/DL (ref 6–8.4)
PROTHROMBIN TIME: 38.5 SEC (ref 9–12.5)
RBC # BLD AUTO: 2.66 M/UL (ref 4.6–6.2)
SODIUM SERPL-SCNC: 135 MMOL/L (ref 136–145)
WBC # BLD AUTO: 7.61 K/UL (ref 3.9–12.7)

## 2024-01-19 PROCEDURE — 93750 INTERROGATION VAD IN PERSON: CPT | Mod: ,,, | Performed by: INTERNAL MEDICINE

## 2024-01-19 PROCEDURE — 83735 ASSAY OF MAGNESIUM: CPT | Performed by: INTERNAL MEDICINE

## 2024-01-19 PROCEDURE — 27000207 HC ISOLATION

## 2024-01-19 PROCEDURE — 83615 LACTATE (LD) (LDH) ENZYME: CPT | Performed by: STUDENT IN AN ORGANIZED HEALTH CARE EDUCATION/TRAINING PROGRAM

## 2024-01-19 PROCEDURE — 36415 COLL VENOUS BLD VENIPUNCTURE: CPT | Performed by: NURSE PRACTITIONER

## 2024-01-19 PROCEDURE — 85025 COMPLETE CBC W/AUTO DIFF WBC: CPT | Performed by: PHYSICIAN ASSISTANT

## 2024-01-19 PROCEDURE — 25000003 PHARM REV CODE 250: Performed by: PHYSICIAN ASSISTANT

## 2024-01-19 PROCEDURE — 25000003 PHARM REV CODE 250: Performed by: INTERNAL MEDICINE

## 2024-01-19 PROCEDURE — 20600001 HC STEP DOWN PRIVATE ROOM

## 2024-01-19 PROCEDURE — 25000003 PHARM REV CODE 250: Performed by: STUDENT IN AN ORGANIZED HEALTH CARE EDUCATION/TRAINING PROGRAM

## 2024-01-19 PROCEDURE — 80076 HEPATIC FUNCTION PANEL: CPT | Performed by: INTERNAL MEDICINE

## 2024-01-19 PROCEDURE — 84134 ASSAY OF PREALBUMIN: CPT | Performed by: NURSE PRACTITIONER

## 2024-01-19 PROCEDURE — 4A023N6 MEASUREMENT OF CARDIAC SAMPLING AND PRESSURE, RIGHT HEART, PERCUTANEOUS APPROACH: ICD-10-PCS | Performed by: INTERNAL MEDICINE

## 2024-01-19 PROCEDURE — 86140 C-REACTIVE PROTEIN: CPT | Performed by: STUDENT IN AN ORGANIZED HEALTH CARE EDUCATION/TRAINING PROGRAM

## 2024-01-19 PROCEDURE — 85610 PROTHROMBIN TIME: CPT | Performed by: PHYSICIAN ASSISTANT

## 2024-01-19 PROCEDURE — 99232 SBSQ HOSP IP/OBS MODERATE 35: CPT | Mod: ,,, | Performed by: NURSE PRACTITIONER

## 2024-01-19 PROCEDURE — 97116 GAIT TRAINING THERAPY: CPT

## 2024-01-19 PROCEDURE — 97530 THERAPEUTIC ACTIVITIES: CPT

## 2024-01-19 PROCEDURE — 27000248 HC VAD-ADDITIONAL DAY

## 2024-01-19 PROCEDURE — 99233 SBSQ HOSP IP/OBS HIGH 50: CPT | Mod: ,,, | Performed by: PHYSICIAN ASSISTANT

## 2024-01-19 PROCEDURE — 99233 SBSQ HOSP IP/OBS HIGH 50: CPT | Mod: FS,,, | Performed by: INTERNAL MEDICINE

## 2024-01-19 PROCEDURE — 83880 ASSAY OF NATRIURETIC PEPTIDE: CPT | Performed by: STUDENT IN AN ORGANIZED HEALTH CARE EDUCATION/TRAINING PROGRAM

## 2024-01-19 PROCEDURE — 84100 ASSAY OF PHOSPHORUS: CPT | Performed by: INTERNAL MEDICINE

## 2024-01-19 PROCEDURE — 80048 BASIC METABOLIC PNL TOTAL CA: CPT | Performed by: INTERNAL MEDICINE

## 2024-01-19 PROCEDURE — 82330 ASSAY OF CALCIUM: CPT | Performed by: INTERNAL MEDICINE

## 2024-01-19 RX ADMIN — FAMOTIDINE 20 MG: 20 TABLET ORAL at 09:01

## 2024-01-19 RX ADMIN — SENNOSIDES AND DOCUSATE SODIUM 2 TABLET: 8.6; 5 TABLET ORAL at 09:01

## 2024-01-19 RX ADMIN — GABAPENTIN 100 MG: 100 CAPSULE ORAL at 09:01

## 2024-01-19 RX ADMIN — ACETAMINOPHEN 1000 MG: 500 TABLET ORAL at 09:01

## 2024-01-19 RX ADMIN — GABAPENTIN 100 MG: 100 CAPSULE ORAL at 08:01

## 2024-01-19 RX ADMIN — FLUTICASONE PROPIONATE 100 MCG: 50 SPRAY, METERED NASAL at 09:01

## 2024-01-19 RX ADMIN — SODIUM ZIRCONIUM CYCLOSILICATE 5 G: 5 POWDER, FOR SUSPENSION ORAL at 09:01

## 2024-01-19 RX ADMIN — Medication: at 09:01

## 2024-01-19 RX ADMIN — VENLAFAXINE 37.5 MG: 37.5 TABLET ORAL at 09:01

## 2024-01-19 RX ADMIN — AMIODARONE HYDROCHLORIDE 400 MG: 200 TABLET ORAL at 09:01

## 2024-01-19 RX ADMIN — Medication: at 08:01

## 2024-01-19 RX ADMIN — TRAZODONE HYDROCHLORIDE 25 MG: 50 TABLET ORAL at 08:01

## 2024-01-19 RX ADMIN — MIRTAZAPINE 7.5 MG: 7.5 TABLET, FILM COATED ORAL at 08:01

## 2024-01-19 RX ADMIN — CHOLECALCIFEROL TAB 25 MCG (1000 UNIT) 1000 UNITS: 25 TAB at 09:01

## 2024-01-19 RX ADMIN — ACETAMINOPHEN 1000 MG: 500 TABLET ORAL at 08:01

## 2024-01-19 RX ADMIN — POLYETHYLENE GLYCOL 3350 17 G: 17 POWDER, FOR SOLUTION ORAL at 09:01

## 2024-01-19 NOTE — ASSESSMENT & PLAN NOTE
- suspect acute tubular injury secondary to hypotension/cardiogenic shock likely compounded by intra-arterial contrast with urinary sediment with granular casts    Kidney function stable  -No acute indication for RRT   -Cystatin C - pending   -strict I/O's  -Continue to monitor for signs if renal recovery   -renal diet.  Has been eating high potassium foods.  Please have Dietician educate on low K foods.  -continue lokelma  -can use PRN lasix as needed for signs of volume overloaded.  -renally all dose medications to eGFR  -avoid nephrotoxic agents wean feasible (i.e. NSAIDs, intra-arterial contrast, supra-therapeutic vancomycin levels, etc.)

## 2024-01-19 NOTE — CARE UPDATE
Care Update:     No acute events overnight. Patient in room 312/312 A. Blood glucose stable on current inpatient regimen. 46 Days Post-Op     Steroid use- None.   Renal function-   Lab Results   Component Value Date    CREATININE 3.9 (H) 01/19/2024        Diet Minced & Moist (IDDSI Level 5) Low Potassium; Honey Thick (Moderately Thick)     POCT Glucose   Date Value Ref Range Status   01/19/2024 148 (H) 70 - 110 mg/dL Final   01/19/2024 85 70 - 110 mg/dL Final   01/18/2024 162 (H) 70 - 110 mg/dL Final   01/18/2024 93 70 - 110 mg/dL Final   01/18/2024 133 (H) 70 - 110 mg/dL Final   01/17/2024 152 (H) 70 - 110 mg/dL Final   01/17/2024 106 70 - 110 mg/dL Final   01/17/2024 124 (H) 70 - 110 mg/dL Final   01/17/2024 76 70 - 110 mg/dL Final   01/16/2024 124 (H) 70 - 110 mg/dL Final   01/16/2024 108 70 - 110 mg/dL Final     Lab Results   Component Value Date    HGBA1C 5.1 01/13/2024         Home Medications: Metformin (off since October)       Plan:  Patient without insulin requirements in the last 5 days. BG remaining at/below goal and tolerating % PO intake. Given this, endocrine will sign off at this time. If BG excursions noted or BG elevated above goal upon daily labs, please do not hesitate to reach out or for any BG related questions. Thank you for your consult.

## 2024-01-19 NOTE — PROGRESS NOTES
[facility] - Cardiology Stepdown  Nephrology  Progress Note    Patient Name: Radha Abbott  MRN: [id number]  Admission Date: 11/9/2023  Hospital Length of Stay: 71 days  Attending Provider: Sandhya Price MD   Primary Care Physician: Vasu Kong MD  Principal Problem:LVAD (left ventricular assist device) present    Subjective:     HPI: Mr. Abbott is a 63-year-old man with ischemic cardiomyopathy with most recent TTE showing EF 10 -15% and G3DD s/p [employer] ICM placement on chronic dobutamine infusion, CAD s/p x3 vessel CABG back in 2009, type 2 diabetes mellitus (most recent hemoglobin A1c 7.6%), hypertension, HLD, history of ventricular fibrillation for which he is on amiodarone and CKD IIIb (baseline creatinine ~2-2.2) who was initially admitted on 11/9 with heart failure exacerbation and hypervolemia for which he was managed with inotrope with dobutamine in addition to IV diuresis. He ultimately underwent IABP placed on 11/21 for mechanical hemodynamic support and subsequently underwent LVAD placement on 12/1. His renal function appears to be mostly stable with IVÁN and creatinine in mid 2s to 3s in addition he is still making urine with Lasix infusion however on 12/5 Nephrology consulted given concern for uremia with BUN 72.     Interval History:   SCr 3.9 down from 4.2, K 5.4 on AM labs.  UOP not accurately recorded, does not appear overloaded on exam.    Wife reports patient has been eating tomato based soups and bananas.  Cystan-C pending.    Review of patient's allergies indicates:  No Known Allergies  Current Facility-Administered Medications   Medication Frequency    0.9%  NaCl infusion (for blood administration) Q24H PRN    0.9%  NaCl infusion Continuous    acetaminophen tablet 1,000 mg TID    alteplase injection 2 mg Once    amiodarone tablet 400 mg Daily    balsam peru-castor oiL Oint BID    bisacodyL suppository 10 mg Daily PRN    dextrose 10% bolus 125 mL 125 mL PRN    dextrose 10% bolus 125 mL  125 mL PRN    dextrose 10% bolus 250 mL 250 mL PRN    dextrose 10% bolus 250 mL 250 mL PRN    famotidine tablet 20 mg Daily    fluticasone propionate 50 mcg/actuation nasal spray 100 mcg Daily    gabapentin capsule 100 mg BID    glucagon (human recombinant) injection 1 mg PRN    glucose chewable tablet 16 g PRN    glucose chewable tablet 24 g PRN    heparin (porcine) injection 1,000 Units PRN    hydrALAZINE injection 10 mg Q6H PRN    insulin aspart U-100 pen 0-5 Units QID (AC + HS) PRN    mirtazapine tablet 7.5 mg Nightly    ondansetron injection 4 mg Q6H PRN    polyethylene glycol packet 17 g Daily    psyllium husk (aspartame) 3.4 gram packet 1 packet Daily    senna-docusate 8.6-50 mg per tablet 2 tablet Daily    sodium chloride 0.9% flush 10 mL PRN    traMADoL tablet 50 mg Q8H PRN    trazodone split tablet 25 mg QHS    venlafaxine tablet 37.5 mg Daily    vitamin D 1000 units tablet 1,000 Units Daily    zolpidem tablet 5 mg Nightly PRN       Objective:     Vital Signs (Most Recent):  Temp: 97.8 °F (36.6 °C) (01/19/24 1126)  Pulse: 89 (01/19/24 1130)  Resp: 18 (01/19/24 1126)  BP: 101/75 (01/19/24 1126)  SpO2: 98 % (01/19/24 1126) Vital Signs (24h Range):  Temp:  [97.6 °F (36.4 °C)-98.4 °F (36.9 °C)] 97.8 °F (36.6 °C)  Pulse:  [] 89  Resp:  [17-19] 18  SpO2:  [94 %-98 %] 98 %  BP: ()/(0-88) 101/75     Weight: 70.3 kg (155 lb) (01/19/24 0720)  Body mass index is 21.02 kg/m².  Body surface area is 1.89 meters squared.    I/O last 3 completed shifts:  In: 1104 [P.O.:1104]  Out: 275 [Urine:275]     Physical Exam  Vitals and nursing note reviewed.   Constitutional:       Comments: Cachectic, temporal wasting   HENT:      Head: Normocephalic and atraumatic.   Eyes:      Conjunctiva/sclera: Conjunctivae normal.      Pupils: Pupils are equal, round, and reactive to light.   Neck:      Comments: Do not appreciate elevated JVP  Cardiovascular:      Rate and Rhythm: Normal rate and regular rhythm.      Comments:  LVAD  Pulmonary:      Effort: Pulmonary effort is normal.      Breath sounds: Normal breath sounds.   Abdominal:      General: Bowel sounds are normal.      Palpations: Abdomen is soft.   Musculoskeletal:         General: No swelling. Normal range of motion.      Cervical back: Normal range of motion and neck supple.   Skin:     General: Skin is warm and dry.   Neurological:      General: No focal deficit present.      Mental Status: He is alert and oriented to person, place, and time.          Significant Labs:  CBC:   Recent Labs   Lab 01/19/24  0614   WBC 7.61   RBC 2.66*   HGB 7.5*   HCT 25.9*   *   MCV 97   MCH 28.2   MCHC 29.0*     CMP:   Recent Labs   Lab 01/19/24  0614   GLU 82   CALCIUM 8.9   ALBUMIN 2.2*   PROT 7.3   *   K 5.4*   CO2 21*      BUN 72*   CREATININE 3.9*   ALKPHOS 134   ALT 31   AST 25   BILITOT 0.3      Assessment/Plan:     Renal/  Hyperkalemia  -Low K diet  -recommend Lokelma 10g QD     Acute renal failure with acute tubular necrosis superimposed on stage 3b chronic kidney disease  - suspect acute tubular injury secondary to hypotension/cardiogenic shock likely compounded by intra-arterial contrast with urinary sediment with granular casts    Kidney function stable  -No acute indication for RRT   -Cystatin C - pending   -strict I/O's  -Continue to monitor for signs if renal recovery   -renal diet.  Has been eating high potassium foods.  Please have Dietician educate on low K foods.  -continue lokelma  -can use PRN lasix as needed for signs of volume overloaded.  -renally all dose medications to eGFR  -avoid nephrotoxic agents wean feasible (i.e. NSAIDs, intra-arterial contrast, supra-therapeutic vancomycin levels, etc.)        Norm Vidal NP  Nephrology  [facility] - Cardiology Stepdown

## 2024-01-19 NOTE — PROGRESS NOTES
Rounded on patient at bedside. Patient is A&Ox 4.  LVAD history interrogated with no abnormal events noted.  LVAD numbers WNL compared to baseline. Pt denies any needs at this time.  Patient and wife have no questions at this time, they are preparing to possibly go to rehab next week. Wife reported that DLES looks good and has no drainage, will assess again closer to discharge. Reviewed with wife and patient care companion and MyChart. Wife reported already having MyChart set up. Encouraged pt to notify nurse if they have any questions, problems or concerns for LVAD coordinator.  Pt verbalized understanding and in agreement of plan. Plan for team to continue to round on patient.

## 2024-01-19 NOTE — PLAN OF CARE
Problem: Fluid and Electrolyte Imbalance (Acute Kidney Injury/Impairment)  Goal: Fluid and Electrolyte Balance  Outcome: Ongoing, Progressing     Problem: Infection  Goal: Absence of Infection Signs and Symptoms  Outcome: Ongoing, Progressing     Problem: Adjustment to Illness (Heart Failure)  Goal: Optimal Coping  Outcome: Ongoing, Progressing     Problem: Cardiac Output Decreased (Heart Failure)  Goal: Optimal Cardiac Output  Outcome: Ongoing, Progressing

## 2024-01-19 NOTE — ASSESSMENT & PLAN NOTE
-HeartMate 3 Implanted  12/1/2023  as DT  -HTS Primary  -Implanted by Dr. Washington  -Hold Coumadin, dosing by PharmD.  Goal INR 2.0-3.0 . Supratherapeutic today.   -Antiplatelets Not on ASA  -LDH is stable overall today. Will continue to monitor daily.  -Weaned off midodrine.  -Speed set at  4900   rpm, LSL 4500 rpm   -Interrogation notable for no events  -ECHO 1/2/24 mild TR, LVEDD 6.25 with HM3 set to 4900 rpm   -Not listed for OHTx, declined for OHTX due to comorbidities   -PT/OT/Speech. Recommending rehab but HD + LVAD may be barrier      Procedure: Device Interrogation Including analysis of device parameters  Current Settings: Ventricular Assist Device  Review of device function is stable/unstable stable        1/19/2024     7:47 AM 1/19/2024     5:35 AM 1/18/2024    11:44 PM 1/18/2024     7:29 PM 1/18/2024     3:46 PM 1/18/2024    12:23 PM 1/18/2024     7:59 AM   TXP LVAD INTERROGATIONS   Type HeartMate3 HeartMate3 HeartMate3 HeartMate3 HeartMate3 HeartMate3 HeartMate3   Flow 3.8 3.5 3.6 3.7 3.4 3.8 4.1   Speed 4900 4900 4900 4900 4900 4900 4900   PI 7.1 6.5 6.7 5.4 8 3.7 3.6   Power ([place]) 3.2 3.3 3.2 3.2 3.3 3.4 3.3   LSL 4500 4500 4500 4500 4500 4500 4500   Low Flow Alarm no    no no no   High Power Alarm no    no no no   Pulsatility Intermittent pulse    Intermittent pulse Intermittent pulse Intermittent pulse

## 2024-01-19 NOTE — SUBJECTIVE & OBJECTIVE
Interval History 1/19/2024:  Patient is seen for follow-up PM&R evaluation and recommendations: Participating with therapy and continues to have goals for rehab. MBSS pending. INR 3.9 today.     HPI, Past Medical, Family, and Social History remains the same as documented in the initial encounter.    Scheduled Medications:    acetaminophen  1,000 mg Per NG tube TID    alteplase  2 mg Intra-Catheter Once    amiodarone  400 mg Oral Daily    balsam peru-castor oiL   Topical (Top) BID    famotidine  20 mg Oral Daily    fluticasone propionate  2 spray Each Nostril Daily    gabapentin  100 mg Oral BID    mirtazapine  7.5 mg Oral Nightly    polyethylene glycol  17 g Oral Daily    psyllium husk (aspartame)  1 packet Oral Daily    senna-docusate 8.6-50 mg  2 tablet Oral Daily    traZODone  25 mg Oral QHS    venlafaxine  37.5 mg Oral Daily    vitamin D  1,000 Units Oral Daily       Diagnostic Results: Labs: Reviewed  ECG: Reviewed  CT: Reviewed    PRN Medications: 0.9%  NaCl infusion (for blood administration), bisacodyL, dextrose 10%, dextrose 10%, dextrose 10%, dextrose 10%, glucagon (human recombinant), glucose, glucose, heparin (porcine), hydrALAZINE, insulin aspart U-100, ondansetron, Flushing PICC/Midline Protocol **AND** [DISCONTINUED] sodium chloride 0.9% **AND** sodium chloride 0.9%, traMADoL, zolpidem    Review of Systems   Constitutional:  Positive for activity change. Negative for fatigue.   Respiratory:  Negative for cough and shortness of breath.    Gastrointestinal:  Negative for abdominal pain.   Musculoskeletal:  Positive for gait problem. Negative for arthralgias and back pain.   Neurological:  Positive for weakness.   Psychiatric/Behavioral:  Negative for agitation, behavioral problems and confusion.      Objective:     Vital Signs (Most Recent):  Temp: 98.1 °F (36.7 °C) (01/19/24 0720)  Pulse: 90 (01/19/24 1002)  Resp: 18 (01/19/24 0720)  BP: (!) 90/0 (01/19/24 0747)  SpO2: 97 % (01/19/24 0720)    Vital  Signs (24h Range):  Temp:  [97.6 °F (36.4 °C)-98.4 °F (36.9 °C)] 98.1 °F (36.7 °C)  Pulse:  [] 90  Resp:  [17-19] 18  SpO2:  [94 %-98 %] 97 %  BP: ()/(0-88) 90/0         Physical Exam  Vitals and nursing note reviewed.   HENT:      Nose: Nose normal.      Mouth/Throat:      Mouth: Mucous membranes are moist.   Eyes:      Extraocular Movements: Extraocular movements intact.      Pupils: Pupils are equal, round, and reactive to light.   Cardiovascular:      Comments: LVAD  Pulmonary:      Effort: Pulmonary effort is normal. No respiratory distress.   Musculoskeletal:      Comments: Deconditioned and generalized weakness   Skin:     General: Skin is warm.   Neurological:      Mental Status: He is alert and oriented to person, place, and time. Mental status is at baseline.      Motor: Weakness present.      Gait: Gait abnormal.   Psychiatric:         Mood and Affect: Mood normal.         Behavior: Behavior normal.          NEUROLOGICAL EXAMINATION:     MENTAL STATUS   Oriented to person, place, and time.     CRANIAL NERVES     CN III, IV, VI   Pupils are equal, round, and reactive to light.

## 2024-01-19 NOTE — PROGRESS NOTES
[facility]  Progress Note    Patient Name: Radha Abbott  MRN: [id number]  Admission Date: 11/9/2023  Length of Stay: 71 days  Attending Physician: Sandhya Price MD    Subjective:     Principal Problem:LVAD (left ventricular assist device) present    Hospital Course:   1/17/24: Participated w/ PT. Ambulated 172' w/ RW and 5 seated rest breaks. Pt requires Yonis when beginning to ambulate but progress to requiring modA with each trial 2/2 as he fatigues he begins pushing RW too far out infront of him, leaning forward excessively, and increases gina to try and finish walking faster. Pt had 1 LOB during gait training requiring maxA to recover balance. Chair follow utilized for pt safety.   1/18/24: Participated w/ OT. Patient completed Sit <> Stand Transfer with minimum assistance and moderate assistance  with  rolling walker   2 x 4 reps from wheelchair  Prolonged rest break required due to fatigue    Interval History 1/19/2024:  Patient is seen for follow-up PM&R evaluation and recommendations: Participating with therapy and continues to have goals for rehab. MBSS pending. INR 3.9 today.     HPI, Past Medical, Family, and Social History remains the same as documented in the initial encounter.    Scheduled Medications:    acetaminophen  1,000 mg Per NG tube TID    alteplase  2 mg Intra-Catheter Once    amiodarone  400 mg Oral Daily    balsam peru-castor oiL   Topical (Top) BID    famotidine  20 mg Oral Daily    fluticasone propionate  2 spray Each Nostril Daily    gabapentin  100 mg Oral BID    mirtazapine  7.5 mg Oral Nightly    polyethylene glycol  17 g Oral Daily    psyllium husk (aspartame)  1 packet Oral Daily    senna-docusate 8.6-50 mg  2 tablet Oral Daily    traZODone  25 mg Oral QHS    venlafaxine  37.5 mg Oral Daily    vitamin D  1,000 Units Oral Daily     Diagnostic Results:   Labs: Reviewed  ECG: Reviewed  CT: Reviewed    PRN Medications: 0.9%  NaCl infusion  (for blood administration), bisacodyL, dextrose 10%, dextrose 10%, dextrose 10%, dextrose 10%, glucagon (human recombinant), glucose, glucose, heparin (porcine), hydrALAZINE, insulin aspart U-100, ondansetron, Flushing PICC/Midline Protocol **AND** [DISCONTINUED] sodium chloride 0.9% **AND** sodium chloride 0.9%, traMADoL, zolpidem    Review of Systems   Constitutional:  Positive for activity change. Negative for fatigue.   Respiratory:  Negative for cough and shortness of breath.    Gastrointestinal:  Negative for abdominal pain.   Musculoskeletal:  Positive for gait problem. Negative for arthralgias and back pain.   Neurological:  Positive for weakness.   Psychiatric/Behavioral:  Negative for agitation, behavioral problems and confusion.      Objective:     Vital Signs (Most Recent):  Temp: 98.1 °F (36.7 °C) (01/19/24 0720)  Pulse: 90 (01/19/24 1002)  Resp: 18 (01/19/24 0720)  BP: (!) 90/0 (01/19/24 0747)  SpO2: 97 % (01/19/24 0720)    Vital Signs (24h Range):  Temp:  [97.6 °F (36.4 °C)-98.4 °F (36.9 °C)] 98.1 °F (36.7 °C)  Pulse:  [] 90  Resp:  [17-19] 18  SpO2:  [94 %-98 %] 97 %  BP: ()/(0-88) 90/0     Physical Exam  Vitals and nursing note reviewed.   HENT:      Nose: Nose normal.      Mouth/Throat:      Mouth: Mucous membranes are moist.   Eyes:      Extraocular Movements: Extraocular movements intact.      Pupils: Pupils are equal, round, and reactive to light.   Cardiovascular:      Comments: LVAD  Pulmonary:      Effort: Pulmonary effort is normal. No respiratory distress.   Musculoskeletal:      Comments: Deconditioned and generalized weakness   Skin:     General: Skin is warm.   Neurological:      Mental Status: He is alert and oriented to person, place, and time. Mental status is at baseline.      Motor: Weakness present.      Gait: Gait abnormal.   Psychiatric:         Mood and Affect: Mood normal.         Behavior: Behavior normal.     Assessment/Plan:      * LVAD (left ventricular assist  device) present  - S/p LVAD 12/1/23.  -  Continues Coumadin. Goal INR 2-3. Weaned off Midodrine.   - Not listed for OHTx, declined for OHTX due to comorbidities.    Impaired mobility  - Related to prolonged/acute hospital course.     Recommendations  -  Encourage mobility, OOB in chair at least 3 hours per day, and early ambulation as appropriate  -  PT/OT evaluate and treat  -  Pain management  -  Monitor for and prevent skin breakdown and pressure ulcers  Early mobility, repositioning/weight shifting every 20-30 minutes when sitting, turn patient every 2 hours, proper mattress/overlay and chair cushioning, pressure relief/heel protector boots  -  DVT prophylaxis    -  Reviewed discharge options (IP rehab, SNF, HH therapy, and OP therapy)     Acute renal failure with acute tubular necrosis superimposed on stage 3b chronic kidney disease  - Nephrology following and suspect acute tubular injury secondary to hypotension/cardiogenic shock likely compounded by intra-arterial contrast and anemia with urinary sediment with granular casts. Was on HD, last HD 1/10/23    Ventricular tachycardia  - continues on home amio    PM&R Recommendation:     At this time, the PM&R team has reviewed this patient's ongoing medical case including inpatient diagnosis, medical history, clinical examination, labs, vitals, current social and functional history to provide the post-acute recommendation as follows:     RECOMMENDATIONS: inpatient rehabilitation due to good motivation/participation with therapies, has been determined to tolerate 3 hours of therapy and good potential for recovery.     The patient will be admitted for comprehensive interdisciplinary inpatient rehabilitation to address the impairments due to medical diagnosis of Debility. S/p LVAD The patient will benefit from an inpatient rehabilitation program to promote functional recovery, implement compensatory strategies and will undergo assessment for needs for durable medical  equipment for safe discharge to the community. This patient will benefit from a coordinated interdisciplinary rehabilitation program services that require close monitoring and treatment with 24-hour rehabilitative nursing and physical/occupational therapies for 3 hours/day for 5 days/week.This interdisciplinary program will be performed under the direction of a physiatrist.    MEDICAL STABILITY:     At this time, barriers for post-acute rehab admission include MBSS pending, INR therapeutic, and RHC pending.     We will continue to follow.     Layne Ramirez NP  Department of Physical Medicine & Rehab   [facility] - Cardiology Stepdown

## 2024-01-19 NOTE — SUBJECTIVE & OBJECTIVE
Interval History: NAEON. No acute complaints. Will need RHC prior to transfer once INR therapeutic.     Continuous Infusions:   sodium chloride 0.9%       Scheduled Meds:   acetaminophen  1,000 mg Per NG tube TID    alteplase  2 mg Intra-Catheter Once    amiodarone  400 mg Oral Daily    balsam peru-castor oiL   Topical (Top) BID    famotidine  20 mg Oral Daily    fluticasone propionate  2 spray Each Nostril Daily    gabapentin  100 mg Oral BID    mirtazapine  7.5 mg Oral Nightly    polyethylene glycol  17 g Oral Daily    psyllium husk (aspartame)  1 packet Oral Daily    senna-docusate 8.6-50 mg  2 tablet Oral Daily    traZODone  25 mg Oral QHS    venlafaxine  37.5 mg Oral Daily    vitamin D  1,000 Units Oral Daily     PRN Meds:0.9%  NaCl infusion (for blood administration), bisacodyL, dextrose 10%, dextrose 10%, dextrose 10%, dextrose 10%, glucagon (human recombinant), glucose, glucose, heparin (porcine), hydrALAZINE, insulin aspart U-100, ondansetron, Flushing PICC/Midline Protocol **AND** [DISCONTINUED] sodium chloride 0.9% **AND** sodium chloride 0.9%, traMADoL, zolpidem    Review of patient's allergies indicates:  No Known Allergies  Objective:     Vital Signs (Most Recent):  Temp: 98.1 °F (36.7 °C) (01/19/24 0720)  Pulse: 90 (01/19/24 1002)  Resp: 18 (01/19/24 0720)  BP: (!) 90/0 (01/19/24 0747)  SpO2: 97 % (01/19/24 0720) Vital Signs (24h Range):  Temp:  [97.5 °F (36.4 °C)-98.4 °F (36.9 °C)] 98.1 °F (36.7 °C)  Pulse:  [] 90  Resp:  [17-19] 18  SpO2:  [90 %-98 %] 97 %  BP: ()/(0-88) 90/0     Patient Vitals for the past 72 hrs (Last 3 readings):   Weight   01/19/24 0720 70.3 kg (155 lb)   01/18/24 0755 71.7 kg (158 lb 1.1 oz)   01/17/24 0700 73.5 kg (162 lb 0.6 oz)       Body mass index is 21.02 kg/m².      Intake/Output Summary (Last 24 hours) at 1/19/2024 1026  Last data filed at 1/19/2024 0907  Gross per 24 hour   Intake 898 ml   Output --   Net 898 ml         Hemodynamic Parameters:        Telemetry: SR       Physical Exam  Constitutional:       Comments: Cachectic, temporal wasting   HENT:      Head: Normocephalic and atraumatic.   Eyes:      Conjunctiva/sclera: Conjunctivae normal.      Pupils: Pupils are equal, round, and reactive to light.   Neck:      Comments: Do not appreciate elevated JVP  Cardiovascular:      Rate and Rhythm: Normal rate and regular rhythm.      Comments: Smooth VAD hum  Pulmonary:      Effort: Pulmonary effort is normal.      Breath sounds: Normal breath sounds.   Abdominal:      General: Bowel sounds are normal.      Palpations: Abdomen is soft.   Musculoskeletal:         General: No swelling. Normal range of motion.      Cervical back: Normal range of motion and neck supple.   Skin:     General: Skin is warm and dry.      Capillary Refill: Capillary refill takes 2 to 3 seconds.   Neurological:      General: No focal deficit present.      Mental Status: He is alert and oriented to person, place, and time.            Significant Labs:  CBC:  Recent Labs   Lab 01/17/24  0541 01/18/24  0305 01/19/24  0614   WBC 6.36 5.76 7.61   RBC 2.42* 2.41* 2.66*   HGB 6.9* 6.9* 7.5*   HCT 22.7* 23.1* 25.9*    397 473*   MCV 94 96 97   MCH 28.5 28.6 28.2   MCHC 30.4* 29.9* 29.0*       BNP:  Recent Labs   Lab 01/15/24  0315 01/17/24  0541 01/19/24  0614   * 906* 1,278*       CMP:  Recent Labs   Lab 01/17/24  0541 01/18/24  0305 01/19/24  0614   GLU 78 77 82   CALCIUM 8.7 8.6* 8.9   ALBUMIN 2.0* 2.0* 2.2*   PROT 7.0 6.8 7.3    138 135*   K 5.0 4.9 5.4*   CO2 20* 20* 21*    107 106   BUN 81* 75* 72*   CREATININE 4.3* 4.2* 3.9*   ALKPHOS 139* 124 134   ALT 38 36 31   AST 36 33 25   BILITOT 0.2 0.2 0.3        Coagulation:   Recent Labs   Lab 01/17/24  0541 01/18/24  0305 01/19/24  0614   INR 3.2* 4.0* 3.9*       LDH:  Recent Labs   Lab 01/17/24  0541 01/18/24  0305 01/19/24  0614   * 294* 323*       Microbiology:  Microbiology Results (last 7 days)        Procedure Component Value Units Date/Time    AFB Culture & Smear [[id number]] Collected: 12/06/23 1532    Order Status: Completed Specimen: Body Fluid from Lung, Right Updated: 01/18/24 0928     AFB Culture & Smear Culture in progress      Culture in progress     AFB CULTURE STAIN No acid fast bacilli seen.    Narrative:      Bronchial Wash            I have reviewed all pertinent labs within the past 24 hours.    Estimated Creatinine Clearance: 19.8 mL/min (A) (based on SCr of 3.9 mg/dL (H)).    Diagnostic Results:  I have reviewed and interpreted all pertinent imaging results/findings within the past 24 hours.   Detail Level: Detailed

## 2024-01-19 NOTE — PT/OT/SLP PROGRESS
Physical Therapy Treatment    Patient Name:  Radha Mora   MRN:  [id number]    Recommendations:     Discharge Recommendations: High Intensity Therapy  Discharge Equipment Recommendations: walker, rolling, wheelchair  Barriers to discharge: None    Assessment:     Radha Mora is a 63 y.o. male admitted with a medical diagnosis of LVAD (left ventricular assist device) present.  He presents with the following impairments/functional limitations: weakness, impaired endurance, impaired self care skills, impaired functional mobility, gait instability, impaired balance, decreased lower extremity function, decreased upper extremity function, decreased safety awareness, impaired cardiopulmonary response to activity. Pt improving w/ functional mobility, was found walking in hallway w/ wife and RN. Patient has demonstrated sufficient progression to warrant high intensity therapy evidenced by objectives noted below.     Rehab Prognosis: Good; patient would benefit from acute skilled PT services to address these deficits and reach maximum level of function.    Recent Surgery: Procedure(s) (LRB):  INSERTION, CATHETER, RIGHT HEART (Right) 1 Day Post-Op    Plan:     During this hospitalization, patient to be seen 5 x/week to address the identified rehab impairments via gait training, therapeutic activities, therapeutic exercises, neuromuscular re-education and progress toward the following goals:    Plan of Care Expires:  02/05/24    Subjective     Chief Complaint: frustration  Patient/Family Comments/goals: pt wants to get home soon  Pain/Comfort:  Pain Rating 1: 0/10  Pain Rating Post-Intervention 1: 0/10      Objective:     Communicated with RN prior to session.  Patient found ambulatory in room/mora with LVAD, telemetry upon PT entry to room. Rehab tech utilized for chair follow    General Precautions: Standard, fall, LVAD, contact  Orthopedic Precautions: N/A  Braces: N/A  Respiratory Status: Room air     Functional  Mobility:  Transfers:     Sit to Stand:  3x minimum assistance with rolling walker  Gait: 78' x2 w/ RW CGA; pt requires verbal and visual cueing for upright posture, proximity to RW, step length, and increasing step width 2/2 pt w/ extremely narrow [place]      AM-PAC 6 CLICK MOBILITY  Turning over in bed (including adjusting bedclothes, sheets and blankets)?: 3  Sitting down on and standing up from a chair with arms (e.g., wheelchair, bedside commode, etc.): 3  Moving from lying on back to sitting on the side of the bed?: 3  Moving to and from a bed to a chair (including a wheelchair)?: 3  Need to walk in hospital room?: 3  Climbing 3-5 steps with a railing?: 2  Basic Mobility Total Score: 17       Treatment & Education:  Pt educated on PT POC/goals, d/c recs, and continued treatment. All questions answered and pt in agreement w/ POC.   Pt educated on proper use of RW for transfers and ambulation  W/c to bedside chair transfer w/ HHA Yonis using step transfer    Patient left up in chair with all lines intact, call button in reach, RN notified, and wife present..    GOALS:   Multidisciplinary Problems       Physical Therapy Goals          Problem: Physical Therapy    Goal Priority Disciplines Outcome Goal Variances Interventions   Physical Therapy Goal     PT, PT/OT Ongoing, Progressing     Description: Goals to be met by: 23     Patient will increase functional independence with mobility by performin. Sit to stand transfer with modified independence  2. Bed to chair transfer with supervision  3. Gait  x 150 feet with supervision using physician approved AD with standing rest breaks prn.   4. Lower extremity exercise program x30 reps per handout, with independence  5. Recite 3/3 sternal precautions and remain complaint with precautions throughout session with no verbal cues                     Multidisciplinary Problems (Resolved)          Problem: Physical Therapy    Goal Priority Disciplines Outcome Goal  Variances Interventions   Physical Therapy Goal   (Resolved)     PT, PT/OT Met     Description: Goals to be met by: 23     Patient will increase functional independence with mobility by performin. Evaluate pt's need for physical therapy.                          Time Tracking:     PT Received On: 24  PT Start Time: 1136     PT Stop Time: 1149  PT Total Time (min): 13 min     Billable Minutes: Gait Training 13    Treatment Type: Treatment  PT/PTA: PT     Number of PTA visits since last PT visit: 0     2024

## 2024-01-19 NOTE — SUBJECTIVE & OBJECTIVE
Interval History:   SCr 3.9 down from 4.2, K 5.4 on AM labs.  UOP not accurately recorded, does not appear overloaded on exam.    Wife reports patient has been eating tomato based soups and bananas.  Cystan-C pending.    Review of patient's allergies indicates:  No Known Allergies  Current Facility-Administered Medications   Medication Frequency    0.9%  NaCl infusion (for blood administration) Q24H PRN    0.9%  NaCl infusion Continuous    acetaminophen tablet 1,000 mg TID    alteplase injection 2 mg Once    amiodarone tablet 400 mg Daily    balsam peru-castor oiL Oint BID    bisacodyL suppository 10 mg Daily PRN    dextrose 10% bolus 125 mL 125 mL PRN    dextrose 10% bolus 125 mL 125 mL PRN    dextrose 10% bolus 250 mL 250 mL PRN    dextrose 10% bolus 250 mL 250 mL PRN    famotidine tablet 20 mg Daily    fluticasone propionate 50 mcg/actuation nasal spray 100 mcg Daily    gabapentin capsule 100 mg BID    glucagon (human recombinant) injection 1 mg PRN    glucose chewable tablet 16 g PRN    glucose chewable tablet 24 g PRN    heparin (porcine) injection 1,000 Units PRN    hydrALAZINE injection 10 mg Q6H PRN    insulin aspart U-100 pen 0-5 Units QID (AC + HS) PRN    mirtazapine tablet 7.5 mg Nightly    ondansetron injection 4 mg Q6H PRN    polyethylene glycol packet 17 g Daily    psyllium husk (aspartame) 3.4 gram packet 1 packet Daily    senna-docusate 8.6-50 mg per tablet 2 tablet Daily    sodium chloride 0.9% flush 10 mL PRN    traMADoL tablet 50 mg Q8H PRN    trazodone split tablet 25 mg QHS    venlafaxine tablet 37.5 mg Daily    vitamin D 1000 units tablet 1,000 Units Daily    zolpidem tablet 5 mg Nightly PRN       Objective:     Vital Signs (Most Recent):  Temp: 97.8 °F (36.6 °C) (01/19/24 1126)  Pulse: 89 (01/19/24 1130)  Resp: 18 (01/19/24 1126)  BP: 101/75 (01/19/24 1126)  SpO2: 98 % (01/19/24 1126) Vital Signs (24h Range):  Temp:  [97.6 °F (36.4 °C)-98.4 °F (36.9 °C)] 97.8 °F (36.6 °C)  Pulse:  []  89  Resp:  [17-19] 18  SpO2:  [94 %-98 %] 98 %  BP: ()/(0-88) 101/75     Weight: 70.3 kg (155 lb) (01/19/24 0720)  Body mass index is 21.02 kg/m².  Body surface area is 1.89 meters squared.    I/O last 3 completed shifts:  In: 1104 [P.O.:1104]  Out: 275 [Urine:275]     Physical Exam  Vitals and nursing note reviewed.   Constitutional:       Comments: Cachectic, temporal wasting   HENT:      Head: Normocephalic and atraumatic.   Eyes:      Conjunctiva/sclera: Conjunctivae normal.      Pupils: Pupils are equal, round, and reactive to light.   Neck:      Comments: Do not appreciate elevated JVP  Cardiovascular:      Rate and Rhythm: Normal rate and regular rhythm.      Comments: LVAD  Pulmonary:      Effort: Pulmonary effort is normal.      Breath sounds: Normal breath sounds.   Abdominal:      General: Bowel sounds are normal.      Palpations: Abdomen is soft.   Musculoskeletal:         General: No swelling. Normal range of motion.      Cervical back: Normal range of motion and neck supple.   Skin:     General: Skin is warm and dry.   Neurological:      General: No focal deficit present.      Mental Status: He is alert and oriented to person, place, and time.          Significant Labs:  CBC:   Recent Labs   Lab 01/19/24  0614   WBC 7.61   RBC 2.66*   HGB 7.5*   HCT 25.9*   *   MCV 97   MCH 28.2   MCHC 29.0*     CMP:   Recent Labs   Lab 01/19/24  0614   GLU 82   CALCIUM 8.9   ALBUMIN 2.2*   PROT 7.3   *   K 5.4*   CO2 21*      BUN 72*   CREATININE 3.9*   ALKPHOS 134   ALT 31   AST 25   BILITOT 0.3

## 2024-01-19 NOTE — PROGRESS NOTES
[facility] - Cardiology Stepdown  Heart Transplant  Progress Note    Patient Name: Radha Abbott  MRN: [id number]  Admission Date: 11/9/2023  Hospital Length of Stay: 71 days  Attending Physician: Sandhya Price MD  Primary Care Provider: Vasu Kong MD  Principal Problem:LVAD (left ventricular assist device) present    Subjective:   Interval History: NAEON. No acute complaints. Will need RHC prior to transfer once INR therapeutic.     Continuous Infusions:   sodium chloride 0.9%       Scheduled Meds:   acetaminophen  1,000 mg Per NG tube TID    alteplase  2 mg Intra-Catheter Once    amiodarone  400 mg Oral Daily    balsam peru-castor oiL   Topical (Top) BID    famotidine  20 mg Oral Daily    fluticasone propionate  2 spray Each Nostril Daily    gabapentin  100 mg Oral BID    mirtazapine  7.5 mg Oral Nightly    polyethylene glycol  17 g Oral Daily    psyllium husk (aspartame)  1 packet Oral Daily    senna-docusate 8.6-50 mg  2 tablet Oral Daily    traZODone  25 mg Oral QHS    venlafaxine  37.5 mg Oral Daily    vitamin D  1,000 Units Oral Daily     PRN Meds:0.9%  NaCl infusion (for blood administration), bisacodyL, dextrose 10%, dextrose 10%, dextrose 10%, dextrose 10%, glucagon (human recombinant), glucose, glucose, heparin (porcine), hydrALAZINE, insulin aspart U-100, ondansetron, Flushing PICC/Midline Protocol **AND** [DISCONTINUED] sodium chloride 0.9% **AND** sodium chloride 0.9%, traMADoL, zolpidem    Review of patient's allergies indicates:  No Known Allergies  Objective:     Vital Signs (Most Recent):  Temp: 98.1 °F (36.7 °C) (01/19/24 0720)  Pulse: 90 (01/19/24 1002)  Resp: 18 (01/19/24 0720)  BP: (!) 90/0 (01/19/24 0747)  SpO2: 97 % (01/19/24 0720) Vital Signs (24h Range):  Temp:  [97.5 °F (36.4 °C)-98.4 °F (36.9 °C)] 98.1 °F (36.7 °C)  Pulse:  [] 90  Resp:  [17-19] 18  SpO2:  [90 %-98 %] 97 %  BP: ()/(0-88) 90/0     Patient Vitals for the past 72 hrs (Last 3 readings):   Weight   01/19/24  0720 70.3 kg (155 lb)   01/18/24 0755 71.7 kg (158 lb 1.1 oz)   01/17/24 0700 73.5 kg (162 lb 0.6 oz)       Body mass index is 21.02 kg/m².      Intake/Output Summary (Last 24 hours) at 1/19/2024 1026  Last data filed at 1/19/2024 0907  Gross per 24 hour   Intake 898 ml   Output --   Net 898 ml         Hemodynamic Parameters:       Telemetry: SR       Physical Exam  Constitutional:       Comments: Cachectic, temporal wasting   HENT:      Head: Normocephalic and atraumatic.   Eyes:      Conjunctiva/sclera: Conjunctivae normal.      Pupils: Pupils are equal, round, and reactive to light.   Neck:      Comments: Do not appreciate elevated JVP  Cardiovascular:      Rate and Rhythm: Normal rate and regular rhythm.      Comments: Smooth VAD hum  Pulmonary:      Effort: Pulmonary effort is normal.      Breath sounds: Normal breath sounds.   Abdominal:      General: Bowel sounds are normal.      Palpations: Abdomen is soft.   Musculoskeletal:         General: No swelling. Normal range of motion.      Cervical back: Normal range of motion and neck supple.   Skin:     General: Skin is warm and dry.      Capillary Refill: Capillary refill takes 2 to 3 seconds.   Neurological:      General: No focal deficit present.      Mental Status: He is alert and oriented to person, place, and time.            Significant Labs:  CBC:  Recent Labs   Lab 01/17/24  0541 01/18/24  0305 01/19/24  0614   WBC 6.36 5.76 7.61   RBC 2.42* 2.41* 2.66*   HGB 6.9* 6.9* 7.5*   HCT 22.7* 23.1* 25.9*    397 473*   MCV 94 96 97   MCH 28.5 28.6 28.2   MCHC 30.4* 29.9* 29.0*       BNP:  Recent Labs   Lab 01/15/24  0315 01/17/24  0541 01/19/24  0614   * 906* 1,278*       CMP:  Recent Labs   Lab 01/17/24  0541 01/18/24  0305 01/19/24  0614   GLU 78 77 82   CALCIUM 8.7 8.6* 8.9   ALBUMIN 2.0* 2.0* 2.2*   PROT 7.0 6.8 7.3    138 135*   K 5.0 4.9 5.4*   CO2 20* 20* 21*    107 106   BUN 81* 75* 72*   CREATININE 4.3* 4.2* 3.9*   ALKPHOS  139* 124 134   ALT 38 36 31   AST 36 33 25   BILITOT 0.2 0.2 0.3        Coagulation:   Recent Labs   Lab 01/17/24  0541 01/18/24  0305 01/19/24  0614   INR 3.2* 4.0* 3.9*       LDH:  Recent Labs   Lab 01/17/24  0541 01/18/24  0305 01/19/24  0614   * 294* 323*       Microbiology:  Microbiology Results (last 7 days)       Procedure Component Value Units Date/Time    AFB Culture & Smear [[id number]] Collected: 12/06/23 1532    Order Status: Completed Specimen: Body Fluid from Lung, Right Updated: 01/18/24 0928     AFB Culture & Smear Culture in progress      Culture in progress     AFB CULTURE STAIN No acid fast bacilli seen.    Narrative:      Bronchial Wash            I have reviewed all pertinent labs within the past 24 hours.    Estimated Creatinine Clearance: 19.8 mL/min (A) (based on SCr of 3.9 mg/dL (H)).    Diagnostic Results:  I have reviewed and interpreted all pertinent imaging results/findings within the past 24 hours.  Assessment and Plan:     63 year old male with hx of CAD s/p 3v CABG (unclear anatomy, 2009), ICM with a recent EF of 15-20% s/p ICD (medtronik 2009), DM2 (a1c 7.7), HTN, HLD, Vfib on amio who presents to the ED with CC of SOB     Pt was recently admitted to [facility] as a transfer.  He was started on a Lasix gtt and did well.  Started on gDMT and discharged home on  5 with plans to follow up in HTS clinic for ongoing transplant evaluation at another facility.  He says that about a few days ago he started to notice LE swelling.  This turned into Nelson and orthopnea.  He says he can't walk to the bathroom without getting SOB.  He also complains of weight gain.  He takes torsemide 20mg BID at home and was told to trial additional Lasix which he did without any improvement.  He was rx metolazone but has not been filled.  He came to the ED     In the ED he was AF with stable VS on RA.  CBC showing chronic anemia.  CMP notable for acute on chronic CKD with baseline around 2.1 and Cr now 2.6.   BNP elevated.  Lactate neg.  CXR showing pulmonary edema.  I evaluated the pt at the bedside.  Bedside TTE showing CVP >15.  He was subsequently admitted to the CCU for diuresis.     He was continued on his home  and was started on a lasix gtt, which he responded well to overnight (net -1700cc. He feels much better this morning as well. Our transplant coordinators have been working on insurance approval and he is now being transferred to [facility] service for transplant evaluation.     * LVAD (left ventricular assist device) present  -HeartMate 3 Implanted  12/1/2023  as DT  -HTS Primary  -Implanted by Dr. Washington  -Hold Coumadin, dosing by PharmD.  Goal INR 2.0-3.0 . Supratherapeutic today.   -Antiplatelets Not on ASA  -LDH is stable overall today. Will continue to monitor daily.  -Weaned off midodrine.  -Speed set at  4900   rpm, LSL 4500 rpm   -Interrogation notable for no events  -ECHO 1/2/24 mild TR, LVEDD 6.25 with HM3 set to 4900 rpm   -Not listed for OHTx, declined for OHTX due to comorbidities   -PT/OT/Speech. Recommending rehab but HD + LVAD may be barrier      Procedure: Device Interrogation Including analysis of device parameters  Current Settings: Ventricular Assist Device  Review of device function is stable/unstable stable        1/19/2024     7:47 AM 1/19/2024     5:35 AM 1/18/2024    11:44 PM 1/18/2024     7:29 PM 1/18/2024     3:46 PM 1/18/2024    12:23 PM 1/18/2024     7:59 AM   TXP LVAD INTERROGATIONS   Type HeartMate3 HeartMate3 HeartMate3 HeartMate3 HeartMate3 HeartMate3 HeartMate3   Flow 3.8 3.5 3.6 3.7 3.4 3.8 4.1   Speed 4900 4900 4900 4900 4900 4900 4900   PI 7.1 6.5 6.7 5.4 8 3.7 3.6   Power ([place]) 3.2 3.3 3.2 3.2 3.3 3.4 3.3   LSL 4500 4500 4500 4500 4500 4500 4500   Low Flow Alarm no    no no no   High Power Alarm no    no no no   Pulsatility Intermittent pulse    Intermittent pulse Intermittent pulse Intermittent pulse         Right parotid mass  - 2.2cm R. Parotid mass noted.   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 Can follow up as outpatient w/ ENT for FNA. No further interventions needed as inpatient.     Lymphadenopathy  +ill contacts  Respiratory panel negative  Strep test negative.   Throat culture showing pseudomonas. As per ID , will treat w/ 5 day course of zosyn (therapy completed 1/10)    Unilateral complete vocal fold paralysis  -Appreciate ENT's help  -S/P augmentation of paralyzed left vocal cord 12/18    Dysphonia  -Speech following closely  -Appreciate ENT's help. S/P augmentation of paralyzed left vocal cord 12/18    Moderate malnutrition  -RD and SLP following  -Tolerating tube feeds. Total daily caloric intake increased to 2160  -Failed MBBS 12/20, continue TF.  -Speech following, appreciate their continued recommendations     Depressed mood  -Psychiatry consulted for depressed mood, SI when left alone  -Recommend sitter when alone (possibly with transition to stepdown).  -Psych reconsulted 1/2, agree with venlafaxine and trazadone    IVÁN (acute kidney injury)  -Nephrology following      Acute on chronic combined systolic and diastolic CHF, NYHA class 4  Pt with known ICM with an EF of 25% on home  presented with decompensated HF.    -S/P LVAD    PAF (paroxysmal atrial fibrillation)  Known hx of pAF. In sinus rhythm on admission, but 1 run of AF RVR overnight. He spontaneously converted.  - Continue home amiodarone 400mg qd  - Continue AC, on hold with supratherapeutic INR       Acute renal failure with acute tubular necrosis superimposed on stage 3b chronic kidney disease  IVÁN on CKD2. Baseline Cr of 1.7-2.0.   - Hold ARNi  -Trend BMPs daily   -Nephrology following   -SLED/SCUF for volume removal began on 12/1. He is anuric  -Did not respond to diuretic challenge with 240 mg IV Lasix, 1000 mg Diuril, and 500 mg IV Diamox done 12/14  -Diuretic challenge 1/8 with 160 mg IVP Lasix,   -total  cc/24 hours   -Now on HD TThSat; but last HD was on  1/10  -Tunneled trialysis line placed 12/22 by IR    Type 2 diabetes mellitus without complication, without long-term current use of insulin  -A1c 7.6, not insulin dependent  - Endocrine following, appreciate assistance with blood glucose management    CAD (coronary artery disease)  -S/p 3vCABG in 2009  -Lipitor on hold 2/2 mild transaminitis  -Not on ASA post VAD    Ventricular tachycardia  Hx VT s/p ICD placement (medtronic 2009)  - Continue home amio 400mg qd        ABBI AdamsC  Heart Transplant  [facility] - Cardiology Stepdown

## 2024-01-19 NOTE — PLAN OF CARE
AAOX4,VSS,O2 sats>93% on RA.Patient ambulating with assistx1 with walker, fall precautions in place,no falls/injuries through the shift.Ambulated mora with spouse 2 times and with PT as well.LVAD DP and numbers WNL, smooth LVAD hum. Patient has no complaints of chest pain/SOB/HA/palpitations.Discussed medications and care.Patient has no questions at this time.Pt visualised and stable with no acute distress.Pt resting well,call light within reach, bed at the lowest position.Spouse by the bedside.    Problem: Adult Inpatient Plan of Care  Goal: Plan of Care Review  Outcome: Ongoing, Progressing  Goal: Patient-Specific Goal (Individualized)  Outcome: Ongoing, Progressing  Goal: Absence of Hospital-Acquired Illness or Injury  Outcome: Ongoing, Progressing  Goal: Optimal Comfort and Wellbeing  Outcome: Ongoing, Progressing  Goal: Readiness for Transition of Care  Outcome: Ongoing, Progressing     Problem: Diabetes Comorbidity  Goal: Blood Glucose Level Within Targeted Range  Outcome: Ongoing, Progressing     Problem: Fluid and Electrolyte Imbalance (Acute Kidney Injury/Impairment)  Goal: Fluid and Electrolyte Balance  Outcome: Ongoing, Progressing     Problem: Oral Intake Inadequate (Acute Kidney Injury/Impairment)  Goal: Optimal Nutrition Intake  Outcome: Ongoing, Progressing     Problem: Renal Function Impairment (Acute Kidney Injury/Impairment)  Goal: Effective Renal Function  Outcome: Ongoing, Progressing     Problem: Infection  Goal: Absence of Infection Signs and Symptoms  Outcome: Ongoing, Progressing     Problem: Adjustment to Illness (Heart Failure)  Goal: Optimal Coping  Outcome: Ongoing, Progressing     Problem: Cardiac Output Decreased (Heart Failure)  Goal: Optimal Cardiac Output  Outcome: Ongoing, Progressing     Problem: Dysrhythmia (Heart Failure)  Goal: Stable Heart Rate and Rhythm  Outcome: Ongoing, Progressing     Problem: Fluid Imbalance (Heart Failure)  Goal: Fluid Balance  Outcome: Ongoing,  Progressing     Problem: Functional Ability Impaired (Heart Failure)  Goal: Optimal Functional Ability  Outcome: Ongoing, Progressing     Problem: Oral Intake Inadequate (Heart Failure)  Goal: Optimal Nutrition Intake  Outcome: Ongoing, Progressing     Problem: Respiratory Compromise (Heart Failure)  Goal: Effective Oxygenation and Ventilation  Outcome: Ongoing, Progressing     Problem: Sleep Disordered Breathing (Heart Failure)  Goal: Effective Breathing Pattern During Sleep  Outcome: Ongoing, Progressing     Problem: Cardiac Output Decreased  Goal: Effective Cardiac Output  Outcome: Ongoing, Progressing     Problem: Fall Injury Risk  Goal: Absence of Fall and Fall-Related Injury  Outcome: Ongoing, Progressing     Problem: Coping Ineffective  Goal: Effective Coping  Outcome: Ongoing, Progressing     Problem: Skin Injury Risk Increased  Goal: Skin Health and Integrity  Outcome: Ongoing, Progressing     Problem: Adjustment to Device (Ventricular Assist Device)  Goal: Optimal Adjustment to Device  Outcome: Ongoing, Progressing     Problem: Bleeding (Ventricular Assist Device)  Goal: Absence of Bleeding  Outcome: Ongoing, Progressing     Problem: Embolism (Ventricular Assist Device)  Goal: Absence of Embolism Signs and Symptoms  Outcome: Ongoing, Progressing     Problem: Hemodynamic Instability (Ventricular Assist Device)  Goal: Optimal Blood Flow  Outcome: Ongoing, Progressing     Problem: Infection (Ventricular Assist Device)  Goal: Absence of Infection Signs and Symptoms  Outcome: Ongoing, Progressing     Problem: Right-Sided Heart Compromise (Ventricular Assist Device)  Goal: Effective Right-Sided Heart Function  Outcome: Ongoing, Progressing     Problem: Adjustment to Illness (Sepsis/Septic Shock)  Goal: Optimal Coping  Outcome: Ongoing, Progressing     Problem: Bleeding (Sepsis/Septic Shock)  Goal: Absence of Bleeding  Outcome: Ongoing, Progressing     Problem: Glycemic Control Impaired (Sepsis/Septic  Shock)  Goal: Blood Glucose Level Within Desired Range  Outcome: Ongoing, Progressing     Problem: Infection Progression (Sepsis/Septic Shock)  Goal: Absence of Infection Signs and Symptoms  Outcome: Ongoing, Progressing     Problem: Nutrition Impaired (Sepsis/Septic Shock)  Goal: Optimal Nutrition Intake  Outcome: Ongoing, Progressing     Problem: Impaired Wound Healing  Goal: Optimal Wound Healing  Outcome: Ongoing, Progressing     Problem: Device-Related Complication Risk (Hemodialysis)  Goal: Safe, Effective Therapy Delivery  Outcome: Ongoing, Progressing     Problem: Hemodynamic Instability (Hemodialysis)  Goal: Effective Tissue Perfusion  Outcome: Ongoing, Progressing     Problem: Infection (Hemodialysis)  Goal: Absence of Infection Signs and Symptoms  Outcome: Ongoing, Progressing

## 2024-01-19 NOTE — PROGRESS NOTES
01/19/2024  Deni Frye    Current provider:  Sandhya Price MD    Device interrogation:      1/19/2024     5:35 AM 1/18/2024    11:44 PM 1/18/2024     7:29 PM 1/18/2024     3:46 PM 1/18/2024    12:23 PM 1/18/2024     7:59 AM 1/18/2024     3:14 AM   TXP LVAD INTERROGATIONS   Type HeartMate3 HeartMate3 HeartMate3 HeartMate3 HeartMate3 HeartMate3 HeartMate3   Flow 3.5 3.6 3.7 3.4 3.8 4.1 3.7   Speed 4900 4900 4900 4900 4900 4900 4900   PI 6.5 6.7 5.4 8 3.7 3.6 5.8   Power (Carrington) 3.3 3.2 3.2 3.3 3.4 3.3 3.2   LSL 4500 4500 4500 4500 4500 4500    Low Flow Alarm    no no no    High Power Alarm    no no no    Pulsatility    Intermittent pulse Intermittent pulse Intermittent pulse           Rounded on [facility] to ensure all mechanical assist device settings (IABP or VAD) were appropriate and all parameters were within limits.  I was able to ensure all back up equipment was present, the staff had no issues, and the Perfusion Department daily rounding was complete.      For implantable VADs: Interrogation of Ventricular assist device was performed with analysis of device parameters and review of device function. I have personally reviewed the interrogation findings and agree with findings as stated.     In emergency, the nursing units have been notified to contact the perfusion department either by:  Calling [phone] from 630am to 4pm Mon thru Fri, utilizing the On-Call Finder functionality of Epic and searching for Perfusion, or by contacting the hospital  from 4pm to 630am and on weekends and asking to speak with the perfusionist on call.    7:44 AM

## 2024-01-19 NOTE — PT/OT/SLP PROGRESS
Occupational Therapy   Treatment    Name: Radha Abbott  MRN: [id number]  Admitting Diagnosis:  LVAD (left ventricular assist device) present  1 Day Post-Op    Recommendations:     Discharge Recommendations: High Intensity Therapy  Discharge Equipment Recommendations:  walker, rolling, wheelchair  Barriers to discharge:  None    Assessment:     Radha Abbott is a 63 y.o. male with a medical diagnosis of LVAD (left ventricular assist device) present.  He presents with impaired ADL and mobility performance deficits. Pt found upright in his bedside chair with spouse present. Session focused on fx'l mobility within room (politely declined out of room mobility, waiting for PT session) and demonstrated safe t/f training technique from low level surfaces (commode x1 trial, chair x2 trials). Pt continues to require min-mod A to ascend from low level surfaces and needs min cues for RW management. Pt is making overall excellent progress in therapy tolerance today however remains a high fall risk. Pt is a high fall risk and not safe to return home. Patient has demonstrated sufficient progression to warrant high intensity therapy evidenced by objectives noted below.   Performance deficits affecting function are weakness, gait instability, impaired endurance, impaired self care skills, decreased coordination, impaired cognition, impaired functional mobility, impaired cardiopulmonary response to activity, impaired balance, decreased safety awareness.     Rehab Prognosis:  Good; patient would benefit from acute skilled OT services to address these deficits and reach maximum level of function.       Plan:     Patient to be seen 5 x/week to address the above listed problems via self-care/home management, therapeutic activities, therapeutic exercises, neuromuscular re-education  Plan of Care Expires: 02/03/24  Plan of Care Reviewed with: patient, spouse    Subjective     Chief Complaint: being in hospital  Patient/Family Comments/goals:  ""how long will I be at rehab though"--- pt educated on stay length (generalized) and explained this was up to discretion of rehab therapists.  Pain/Comfort:  Pain Rating 1: 0/10  Pain Rating Post-Intervention 1: 0/10    Objective:     Communicated with: RN prior to session.  Patient found up in chair with LVAD, telemetry upon OT entry to room.    General Precautions: Standard, fall, LVAD    Orthopedic Precautions:N/A  Braces: N/A  Respiratory Status: Room air     Occupational Performance:     Bed Mobility:    NT    Functional Mobility/Transfers:  Patient completed Sit <> Stand Transfer with minimum assistance  with  rolling walker   Patient completed Toilet Transfer Step Transfer technique with moderate assistance with  rolling walker  Functional Mobility: Pt completed x2 trials of standing from low level chair with min A x1 on first attempt, mod A x1 on second attempt using a RW. Pt ambulated to restroom for toilet t/f with min A with pt using sink and grab bar to A with ascend. Pt completed 1 full lap in room using RW with CGA, cues for upright needed.    Activities of Daily Living:  Lower Body Dressing: stand by assistance donning tennis shoe at chair       [facility] 6 Click ADL: 20    Treatment & Education:  Pt educated on role of occupational therapy, POC, and safety during ADLs and functional mobility. Pt and OT discussed importance of safe, continued mobility to optimize daily living skills. Pt verbalized understanding.   Pt on batteries during session.  White board updated during session. Pt given instruction to call for medical staff/nurse for assistance.       Patient left up in chair with all lines intact, call button in reach, RN notified, and spouse present    GOALS:   Multidisciplinary Problems       Occupational Therapy Goals          Problem: Occupational Therapy    Goal Priority Disciplines Outcome Interventions   Occupational Therapy Goal     OT, PT/OT Ongoing, Progressing    Description: Goals to be " met by: 2/3/24    Patient will increase functional independence with ADLs by performing:    UB Dressing with SBA  LE Dressing with Supervision.  Grooming while standing at sink with Supervision.  Toileting from bedside commode with SBA for hygiene and clothing management.   Step transfer to bedside commode with SBA  Avery with VAD management during ADLs                             Time Tracking:     OT Date of Treatment: 01/19/24  OT Start Time: 1018  OT Stop Time: 1033  OT Total Time (min): 15 min    Billable Minutes:Therapeutic Activity 15 min    OT/PRIETO: OT     Number of PRIETO visits since last OT visit: 0    1/19/2024

## 2024-01-19 NOTE — NURSING
Nurses Note -- 4 Eyes      01/18/2024   6:30 PM      Skin assessed during: Q Shift Change      [] No Altered Skin Integrity Present    []Prevention Measures Documented      [x] Yes- Altered Skin Integrity Present or Discovered   [] LDA Added if Not in Epic (Describe Wound)   [] New Altered Skin Integrity was Present on Admit and Documented in LDA   [] Wound Image Taken    Wound Care Consulted? No    Attending Nurse:  Caryl Wilkins RN    Second RN/Staff Member:  Lashae Weber RN

## 2024-01-20 LAB
ALBUMIN SERPL BCP-MCNC: 2 G/DL (ref 3.5–5.2)
ALP SERPL-CCNC: 128 U/L (ref 55–135)
ALT SERPL W/O P-5'-P-CCNC: 26 U/L (ref 10–44)
ANION GAP SERPL CALC-SCNC: 7 MMOL/L (ref 8–16)
AST SERPL-CCNC: 22 U/L (ref 10–40)
BASOPHILS # BLD AUTO: 0.04 K/UL (ref 0–0.2)
BASOPHILS NFR BLD: 0.6 % (ref 0–1.9)
BILIRUB DIRECT SERPL-MCNC: 0.1 MG/DL (ref 0.1–0.3)
BILIRUB SERPL-MCNC: 0.3 MG/DL (ref 0.1–1)
BUN SERPL-MCNC: 66 MG/DL (ref 8–23)
CALCIUM SERPL-MCNC: 8.5 MG/DL (ref 8.7–10.5)
CHLORIDE SERPL-SCNC: 108 MMOL/L (ref 95–110)
CO2 SERPL-SCNC: 22 MMOL/L (ref 23–29)
CREAT SERPL-MCNC: 3.6 MG/DL (ref 0.5–1.4)
DIFFERENTIAL METHOD BLD: ABNORMAL
EOSINOPHIL # BLD AUTO: 0.4 K/UL (ref 0–0.5)
EOSINOPHIL NFR BLD: 5.2 % (ref 0–8)
ERYTHROCYTE [DISTWIDTH] IN BLOOD BY AUTOMATED COUNT: 17.1 % (ref 11.5–14.5)
EST. GFR  (NO RACE VARIABLE): 18.4 ML/MIN/1.73 M^2
GLUCOSE SERPL-MCNC: 73 MG/DL (ref 70–110)
HCT VFR BLD AUTO: 23.7 % (ref 40–54)
HGB BLD-MCNC: 6.9 G/DL (ref 14–18)
IMM GRANULOCYTES # BLD AUTO: 0.02 K/UL (ref 0–0.04)
IMM GRANULOCYTES NFR BLD AUTO: 0.3 % (ref 0–0.5)
INR PPP: 3.8 (ref 0.8–1.2)
LDH SERPL L TO P-CCNC: 309 U/L (ref 110–260)
LYMPHOCYTES # BLD AUTO: 0.8 K/UL (ref 1–4.8)
LYMPHOCYTES NFR BLD: 12.4 % (ref 18–48)
MAGNESIUM SERPL-MCNC: 1.7 MG/DL (ref 1.6–2.6)
MCH RBC QN AUTO: 27.7 PG (ref 27–31)
MCHC RBC AUTO-ENTMCNC: 29.1 G/DL (ref 32–36)
MCV RBC AUTO: 95 FL (ref 82–98)
MONOCYTES # BLD AUTO: 0.5 K/UL (ref 0.3–1)
MONOCYTES NFR BLD: 7.9 % (ref 4–15)
NEUTROPHILS # BLD AUTO: 4.9 K/UL (ref 1.8–7.7)
NEUTROPHILS NFR BLD: 73.6 % (ref 38–73)
NRBC BLD-RTO: 0 /100 WBC
PHOSPHATE SERPL-MCNC: 4.2 MG/DL (ref 2.7–4.5)
PLATELET # BLD AUTO: 405 K/UL (ref 150–450)
PMV BLD AUTO: 8.7 FL (ref 9.2–12.9)
POTASSIUM SERPL-SCNC: 4.9 MMOL/L (ref 3.5–5.1)
PROT SERPL-MCNC: 6.8 G/DL (ref 6–8.4)
PROTHROMBIN TIME: 37.2 SEC (ref 9–12.5)
RBC # BLD AUTO: 2.49 M/UL (ref 4.6–6.2)
SODIUM SERPL-SCNC: 137 MMOL/L (ref 136–145)
WBC # BLD AUTO: 6.7 K/UL (ref 3.9–12.7)

## 2024-01-20 PROCEDURE — 83615 LACTATE (LD) (LDH) ENZYME: CPT | Performed by: STUDENT IN AN ORGANIZED HEALTH CARE EDUCATION/TRAINING PROGRAM

## 2024-01-20 PROCEDURE — 97530 THERAPEUTIC ACTIVITIES: CPT

## 2024-01-20 PROCEDURE — 85025 COMPLETE CBC W/AUTO DIFF WBC: CPT | Performed by: PHYSICIAN ASSISTANT

## 2024-01-20 PROCEDURE — 83735 ASSAY OF MAGNESIUM: CPT | Performed by: INTERNAL MEDICINE

## 2024-01-20 PROCEDURE — 25000003 PHARM REV CODE 250: Performed by: PHYSICIAN ASSISTANT

## 2024-01-20 PROCEDURE — 80076 HEPATIC FUNCTION PANEL: CPT | Performed by: INTERNAL MEDICINE

## 2024-01-20 PROCEDURE — 36415 COLL VENOUS BLD VENIPUNCTURE: CPT | Performed by: PHYSICIAN ASSISTANT

## 2024-01-20 PROCEDURE — 27000207 HC ISOLATION

## 2024-01-20 PROCEDURE — 99233 SBSQ HOSP IP/OBS HIGH 50: CPT | Mod: ,,, | Performed by: PHYSICIAN ASSISTANT

## 2024-01-20 PROCEDURE — 25000003 PHARM REV CODE 250: Performed by: STUDENT IN AN ORGANIZED HEALTH CARE EDUCATION/TRAINING PROGRAM

## 2024-01-20 PROCEDURE — 20600001 HC STEP DOWN PRIVATE ROOM

## 2024-01-20 PROCEDURE — 93750 INTERROGATION VAD IN PERSON: CPT | Mod: ,,, | Performed by: INTERNAL MEDICINE

## 2024-01-20 PROCEDURE — 25000003 PHARM REV CODE 250: Performed by: INTERNAL MEDICINE

## 2024-01-20 PROCEDURE — 85610 PROTHROMBIN TIME: CPT | Performed by: PHYSICIAN ASSISTANT

## 2024-01-20 PROCEDURE — 84100 ASSAY OF PHOSPHORUS: CPT | Performed by: INTERNAL MEDICINE

## 2024-01-20 PROCEDURE — 27000248 HC VAD-ADDITIONAL DAY

## 2024-01-20 PROCEDURE — 80048 BASIC METABOLIC PNL TOTAL CA: CPT | Performed by: INTERNAL MEDICINE

## 2024-01-20 RX ADMIN — CHOLECALCIFEROL TAB 25 MCG (1000 UNIT) 1000 UNITS: 25 TAB at 09:01

## 2024-01-20 RX ADMIN — GABAPENTIN 100 MG: 100 CAPSULE ORAL at 08:01

## 2024-01-20 RX ADMIN — VENLAFAXINE 37.5 MG: 37.5 TABLET ORAL at 09:01

## 2024-01-20 RX ADMIN — FLUTICASONE PROPIONATE 100 MCG: 50 SPRAY, METERED NASAL at 09:01

## 2024-01-20 RX ADMIN — GABAPENTIN 100 MG: 100 CAPSULE ORAL at 09:01

## 2024-01-20 RX ADMIN — SENNOSIDES AND DOCUSATE SODIUM 2 TABLET: 8.6; 5 TABLET ORAL at 09:01

## 2024-01-20 RX ADMIN — Medication 1 TUBE: at 08:01

## 2024-01-20 RX ADMIN — ACETAMINOPHEN 1000 MG: 500 TABLET ORAL at 09:01

## 2024-01-20 RX ADMIN — TRAZODONE HYDROCHLORIDE 25 MG: 50 TABLET ORAL at 08:01

## 2024-01-20 RX ADMIN — MIRTAZAPINE 7.5 MG: 7.5 TABLET, FILM COATED ORAL at 08:01

## 2024-01-20 RX ADMIN — Medication: at 09:01

## 2024-01-20 RX ADMIN — ACETAMINOPHEN 1000 MG: 500 TABLET ORAL at 07:01

## 2024-01-20 RX ADMIN — AMIODARONE HYDROCHLORIDE 400 MG: 200 TABLET ORAL at 09:01

## 2024-01-20 RX ADMIN — FAMOTIDINE 20 MG: 20 TABLET ORAL at 09:01

## 2024-01-20 NOTE — PLAN OF CARE
Plan of care discussed with patient. Patient AOX4 and VSS. Patient ambulating with assistx1, fall precautions in place. LVAD DP and numbers WNL, smooth LVAD hum. Patient has no complaints of pain. Discussed medications and care. Patient has no questions at this time.

## 2024-01-20 NOTE — PROGRESS NOTES
01/20/2024  Alondra Dykes    Current provider:  Sandhya Price MD    Device interrogation:      1/20/2024     4:07 AM 1/19/2024     8:10 PM 1/19/2024     4:12 PM 1/19/2024    11:59 AM 1/19/2024     7:47 AM 1/19/2024     5:35 AM 1/18/2024    11:44 PM   TXP LVAD INTERROGATIONS   Type HeartMate3 HeartMate3 HeartMate3 HeartMate3 HeartMate3 HeartMate3 HeartMate3   Flow 3.4 3.5 3.3 3.7 3.8 3.5 3.6   Speed 4900 4900 4900 4900 4900 4900 4900   PI 6.9 5.7 6.2 5.2 7.1 6.5 6.7   Power (Carrington) 3.3 3.2 3.2 3.3 3.2 3.3 3.2   LSL 4500 4500 4500 4500 4500 4500 4500   Low Flow Alarm   no no no     High Power Alarm   no no no     Pulsatility Intermittent pulse Intermittent pulse Intermittent pulse Intermittent pulse Intermittent pulse            Rounded on [facility] to ensure all mechanical assist device settings (IABP or VAD) were appropriate and all parameters were within limits.  I was able to ensure all back up equipment was present, the staff had no issues, and the Perfusion Department daily rounding was complete.      For implantable VADs: Interrogation of Ventricular assist device was performed with analysis of device parameters and review of device function. I have personally reviewed the interrogation findings and agree with findings as stated.     In emergency, the nursing units have been notified to contact the perfusion department either by:  Calling [phone] from 630am to 4pm Mon thru Fri, utilizing the On-Call Finder functionality of Epic and searching for Perfusion, or by contacting the hospital  from 4pm to 630am and on weekends and asking to speak with the perfusionist on call.    7:32 AM

## 2024-01-20 NOTE — SUBJECTIVE & OBJECTIVE
Interval History: NAEON. No complaints. Pt's wife noticed dry drainage from DLES overnight, will do dressing change today and culture if any drainage present. No other infectious symptoms.     Continuous Infusions:   sodium chloride 0.9%       Scheduled Meds:   acetaminophen  1,000 mg Per NG tube TID    alteplase  2 mg Intra-Catheter Once    amiodarone  400 mg Oral Daily    balsam peru-castor oiL   Topical (Top) BID    famotidine  20 mg Oral Daily    fluticasone propionate  2 spray Each Nostril Daily    gabapentin  100 mg Oral BID    mirtazapine  7.5 mg Oral Nightly    polyethylene glycol  17 g Oral Daily    psyllium husk (aspartame)  1 packet Oral Daily    senna-docusate 8.6-50 mg  2 tablet Oral Daily    traZODone  25 mg Oral QHS    venlafaxine  37.5 mg Oral Daily    vitamin D  1,000 Units Oral Daily     PRN Meds:0.9%  NaCl infusion (for blood administration), bisacodyL, dextrose 10%, dextrose 10%, dextrose 10%, dextrose 10%, glucagon (human recombinant), glucose, glucose, heparin (porcine), hydrALAZINE, insulin aspart U-100, ondansetron, Flushing PICC/Midline Protocol **AND** [DISCONTINUED] sodium chloride 0.9% **AND** sodium chloride 0.9%, traMADoL, zolpidem    Review of patient's allergies indicates:  No Known Allergies  Objective:     Vital Signs (Most Recent):  Temp: 97.5 °F (36.4 °C) (01/20/24 0748)  Pulse: 95 (01/20/24 0748)  Resp: 18 (01/20/24 0748)  BP: (!) 92/0 (01/20/24 0748)  SpO2: (!) 93 % (01/20/24 0748) Vital Signs (24h Range):  Temp:  [97.1 °F (36.2 °C)-98.2 °F (36.8 °C)] 97.5 °F (36.4 °C)  Pulse:  [] 95  Resp:  [16-20] 18  SpO2:  [93 %-100 %] 93 %  BP: ()/(0-84) 92/0     Patient Vitals for the past 72 hrs (Last 3 readings):   Weight   01/20/24 0748 72.6 kg (160 lb 0.9 oz)   01/19/24 0720 70.3 kg (155 lb)   01/18/24 0755 71.7 kg (158 lb 1.1 oz)       Body mass index is 21.71 kg/m².      Intake/Output Summary (Last 24 hours) at 1/20/2024 0910  Last data filed at 1/20/2024 0154  Gross per  24 hour   Intake 342 ml   Output 150 ml   Net 192 ml         Hemodynamic Parameters:       Telemetry: SR       Physical Exam  Constitutional:       Comments: Cachectic, temporal wasting   HENT:      Head: Normocephalic and atraumatic.   Eyes:      Conjunctiva/sclera: Conjunctivae normal.      Pupils: Pupils are equal, round, and reactive to light.   Neck:      Comments: Do not appreciate elevated JVP  Cardiovascular:      Rate and Rhythm: Normal rate and regular rhythm.      Comments: Smooth VAD hum  Pulmonary:      Effort: Pulmonary effort is normal.      Breath sounds: Normal breath sounds.   Abdominal:      General: Bowel sounds are normal.      Palpations: Abdomen is soft.   Musculoskeletal:         General: No swelling. Normal range of motion.      Cervical back: Normal range of motion and neck supple.   Skin:     General: Skin is warm and dry.      Capillary Refill: Capillary refill takes 2 to 3 seconds.   Neurological:      General: No focal deficit present.      Mental Status: He is alert and oriented to person, place, and time.            Significant Labs:  CBC:  Recent Labs   Lab 01/18/24  0305 01/19/24  0614 01/20/24  0256   WBC 5.76 7.61 6.70   RBC 2.41* 2.66* 2.49*   HGB 6.9* 7.5* 6.9*   HCT 23.1* 25.9* 23.7*    473* 405   MCV 96 97 95   MCH 28.6 28.2 27.7   MCHC 29.9* 29.0* 29.1*       BNP:  Recent Labs   Lab 01/15/24  0315 01/17/24  0541 01/19/24  0614   * 906* 1,278*       CMP:  Recent Labs   Lab 01/18/24  0305 01/19/24  0614 01/20/24  0256   GLU 77 82 73   CALCIUM 8.6* 8.9 8.5*   ALBUMIN 2.0* 2.2* 2.0*   PROT 6.8 7.3 6.8    135* 137   K 4.9 5.4* 4.9   CO2 20* 21* 22*    106 108   BUN 75* 72* 66*   CREATININE 4.2* 3.9* 3.6*   ALKPHOS 124 134 128   ALT 36 31 26   AST 33 25 22   BILITOT 0.2 0.3 0.3        Coagulation:   Recent Labs   Lab 01/18/24  0305 01/19/24  0614 01/20/24  0256   INR 4.0* 3.9* 3.8*       LDH:  Recent Labs   Lab 01/18/24  0305 01/19/24  0614 01/20/24  0256    * 323* 309*       Microbiology:  Microbiology Results (last 7 days)       Procedure Component Value Units Date/Time    AFB Culture & Smear [[id number]] Collected: 12/06/23 1532    Order Status: Completed Specimen: Body Fluid from Lung, Right Updated: 01/18/24 0928     AFB Culture & Smear Culture in progress      Culture in progress     AFB CULTURE STAIN No acid fast bacilli seen.    Narrative:      Bronchial Wash            I have reviewed all pertinent labs within the past 24 hours.    Estimated Creatinine Clearance: 22.1 mL/min (A) (based on SCr of 3.6 mg/dL (H)).    Diagnostic Results:  I have reviewed and interpreted all pertinent imaging results/findings within the past 24 hours.

## 2024-01-20 NOTE — PROGRESS NOTES
[facility] - Cardiology Stepdown  Heart Transplant  Progress Note    Patient Name: Radha Abbott  MRN: [id number]  Admission Date: 11/9/2023  Hospital Length of Stay: 72 days  Attending Physician: Sandhya Price MD  Primary Care Provider: Vasu Kong MD  Principal Problem:LVAD (left ventricular assist device) present    Subjective:   Interval History: NAEON. No complaints. Pt's wife noticed dry drainage from DLES overnight, will do dressing change today and culture if any drainage present. No other infectious symptoms.     Continuous Infusions:   sodium chloride 0.9%       Scheduled Meds:   acetaminophen  1,000 mg Per NG tube TID    alteplase  2 mg Intra-Catheter Once    amiodarone  400 mg Oral Daily    balsam peru-castor oiL   Topical (Top) BID    famotidine  20 mg Oral Daily    fluticasone propionate  2 spray Each Nostril Daily    gabapentin  100 mg Oral BID    mirtazapine  7.5 mg Oral Nightly    polyethylene glycol  17 g Oral Daily    psyllium husk (aspartame)  1 packet Oral Daily    senna-docusate 8.6-50 mg  2 tablet Oral Daily    traZODone  25 mg Oral QHS    venlafaxine  37.5 mg Oral Daily    vitamin D  1,000 Units Oral Daily     PRN Meds:0.9%  NaCl infusion (for blood administration), bisacodyL, dextrose 10%, dextrose 10%, dextrose 10%, dextrose 10%, glucagon (human recombinant), glucose, glucose, heparin (porcine), hydrALAZINE, insulin aspart U-100, ondansetron, Flushing PICC/Midline Protocol **AND** [DISCONTINUED] sodium chloride 0.9% **AND** sodium chloride 0.9%, traMADoL, zolpidem    Review of patient's allergies indicates:  No Known Allergies  Objective:     Vital Signs (Most Recent):  Temp: 97.5 °F (36.4 °C) (01/20/24 0748)  Pulse: 95 (01/20/24 0748)  Resp: 18 (01/20/24 0748)  BP: (!) 92/0 (01/20/24 0748)  SpO2: (!) 93 % (01/20/24 0748) Vital Signs (24h Range):  Temp:  [97.1 °F (36.2 °C)-98.2 °F (36.8 °C)] 97.5 °F (36.4 °C)  Pulse:  [] 95  Resp:  [16-20] 18  SpO2:  [93 %-100 %] 93 %  BP:  ()/(0-84) 92/0     Patient Vitals for the past 72 hrs (Last 3 readings):   Weight   01/20/24 0748 72.6 kg (160 lb 0.9 oz)   01/19/24 0720 70.3 kg (155 lb)   01/18/24 0755 71.7 kg (158 lb 1.1 oz)       Body mass index is 21.71 kg/m².      Intake/Output Summary (Last 24 hours) at 1/20/2024 0910  Last data filed at 1/20/2024 0154  Gross per 24 hour   Intake 342 ml   Output 150 ml   Net 192 ml         Hemodynamic Parameters:       Telemetry: SR       Physical Exam  Constitutional:       Comments: Cachectic, temporal wasting   HENT:      Head: Normocephalic and atraumatic.   Eyes:      Conjunctiva/sclera: Conjunctivae normal.      Pupils: Pupils are equal, round, and reactive to light.   Neck:      Comments: Do not appreciate elevated JVP  Cardiovascular:      Rate and Rhythm: Normal rate and regular rhythm.      Comments: Smooth VAD hum  Pulmonary:      Effort: Pulmonary effort is normal.      Breath sounds: Normal breath sounds.   Abdominal:      General: Bowel sounds are normal.      Palpations: Abdomen is soft.   Musculoskeletal:         General: No swelling. Normal range of motion.      Cervical back: Normal range of motion and neck supple.   Skin:     General: Skin is warm and dry.      Capillary Refill: Capillary refill takes 2 to 3 seconds.   Neurological:      General: No focal deficit present.      Mental Status: He is alert and oriented to person, place, and time.            Significant Labs:  CBC:  Recent Labs   Lab 01/18/24  0305 01/19/24  0614 01/20/24  0256   WBC 5.76 7.61 6.70   RBC 2.41* 2.66* 2.49*   HGB 6.9* 7.5* 6.9*   HCT 23.1* 25.9* 23.7*    473* 405   MCV 96 97 95   MCH 28.6 28.2 27.7   MCHC 29.9* 29.0* 29.1*       BNP:  Recent Labs   Lab 01/15/24  0315 01/17/24  0541 01/19/24  0614   * 906* 1,278*       CMP:  Recent Labs   Lab 01/18/24  0305 01/19/24  0614 01/20/24  0256   GLU 77 82 73   CALCIUM 8.6* 8.9 8.5*   ALBUMIN 2.0* 2.2* 2.0*   PROT 6.8 7.3 6.8    135* 137   K 4.9  5.4* 4.9   CO2 20* 21* 22*    106 108   BUN 75* 72* 66*   CREATININE 4.2* 3.9* 3.6*   ALKPHOS 124 134 128   ALT 36 31 26   AST 33 25 22   BILITOT 0.2 0.3 0.3        Coagulation:   Recent Labs   Lab 01/18/24  0305 01/19/24  0614 01/20/24  0256   INR 4.0* 3.9* 3.8*       LDH:  Recent Labs   Lab 01/18/24  0305 01/19/24  0614 01/20/24  0256   * 323* 309*       Microbiology:  Microbiology Results (last 7 days)       Procedure Component Value Units Date/Time    AFB Culture & Smear [[id number]] Collected: 12/06/23 1532    Order Status: Completed Specimen: Body Fluid from Lung, Right Updated: 01/18/24 0928     AFB Culture & Smear Culture in progress      Culture in progress     AFB CULTURE STAIN No acid fast bacilli seen.    Narrative:      Bronchial Wash            I have reviewed all pertinent labs within the past 24 hours.    Estimated Creatinine Clearance: 22.1 mL/min (A) (based on SCr of 3.6 mg/dL (H)).    Diagnostic Results:  I have reviewed and interpreted all pertinent imaging results/findings within the past 24 hours.  Assessment and Plan:     63 year old male with hx of CAD s/p 3v CABG (unclear anatomy, 2009), ICM with a recent EF of 15-20% s/p ICD (medtronik 2009), DM2 (a1c 7.7), HTN, HLD, Vfib on amio who presents to the ED with CC of SOB     Pt was recently admitted to [facility] as a transfer.  He was started on a Lasix gtt and did well.  Started on gDMT and discharged home on  5 with plans to follow up in HTS clinic for ongoing transplant evaluation at another facility.  He says that about a few days ago he started to notice LE swelling.  This turned into Nelson and orthopnea.  He says he can't walk to the bathroom without getting SOB.  He also complains of weight gain.  He takes torsemide 20mg BID at home and was told to trial additional Lasix which he did without any improvement.  He was rx metolazone but has not been filled.  He came to the ED     In the ED he was AF with stable VS on RA.  CBC  showing chronic anemia.  CMP notable for acute on chronic CKD with baseline around 2.1 and Cr now 2.6.  BNP elevated.  Lactate neg.  CXR showing pulmonary edema.  I evaluated the pt at the bedside.  Bedside TTE showing CVP >15.  He was subsequently admitted to the CCU for diuresis.     He was continued on his home  and was started on a lasix gtt, which he responded well to overnight (net -1700cc. He feels much better this morning as well. Our transplant coordinators have been working on insurance approval and he is now being transferred to [facility] service for transplant evaluation.     * LVAD (left ventricular assist device) present  -HeartMate 3 Implanted  12/1/2023  as DT  -HTS Primary  -Implanted by Dr. Washington  -Hold Coumadin, dosing by PharmD.  Goal INR 2.0-3.0 . Supratherapeutic today.   -Antiplatelets Not on ASA  -LDH is stable overall today. Will continue to monitor daily.  -Weaned off midodrine.  -Speed set at  4900   rpm, LSL 4500 rpm   -Interrogation notable for no events  -ECHO 1/2/24 mild TR, LVEDD 6.25 with HM3 set to 4900 rpm   -Not listed for OHTx, declined for OHTX due to comorbidities   -PT/OT/Speech. Recommending rehab but HD + LVAD may be barrier      Procedure: Device Interrogation Including analysis of device parameters  Current Settings: Ventricular Assist Device  Review of device function is stable/unstable stable        1/19/2024     7:47 AM 1/19/2024     5:35 AM 1/18/2024    11:44 PM 1/18/2024     7:29 PM 1/18/2024     3:46 PM 1/18/2024    12:23 PM 1/18/2024     7:59 AM   TXP LVAD INTERROGATIONS   Type HeartMate3 HeartMate3 HeartMate3 HeartMate3 HeartMate3 HeartMate3 HeartMate3   Flow 3.8 3.5 3.6 3.7 3.4 3.8 4.1   Speed 4900 4900 4900 4900 4900 4900 4900   PI 7.1 6.5 6.7 5.4 8 3.7 3.6   Power (Carrington) 3.2 3.3 3.2 3.2 3.3 3.4 3.3   LSL 4500 4500 4500 4500 4500 4500 4500   Low Flow Alarm no    no no no   High Power Alarm no    no no no   Pulsatility Intermittent pulse    Intermittent pulse  Intermittent pulse Intermittent pulse         Right parotid mass  - 2.2cm R. Parotid mass noted.   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 Can follow up as outpatient w/ ENT for FNA. No further interventions needed as inpatient.     Lymphadenopathy  +ill contacts  Respiratory panel negative  Strep test negative.   Throat culture showing pseudomonas. As per ID , will treat w/ 5 day course of zosyn (therapy completed 1/10)    Unilateral complete vocal fold paralysis  -Appreciate ENT's help  -S/P augmentation of paralyzed left vocal cord 12/18    Dysphonia  -Speech following closely  -Appreciate ENT's help. S/P augmentation of paralyzed left vocal cord 12/18    Moderate malnutrition  -RD and SLP following  -Tolerating tube feeds. Total daily caloric intake increased to 2160  -Failed MBBS 12/20, continue TF.  -Speech following, appreciate their continued recommendations     Depressed mood  -Psychiatry consulted for depressed mood, SI when left alone  -Recommend sitter when alone (possibly with transition to stepdown).  -Psych reconsulted 1/2, agree with venlafaxine and trazadone    IVÁN (acute kidney injury)  -Nephrology following      Acute on chronic combined systolic and diastolic CHF, NYHA class 4  Pt with known ICM with an EF of 25% on home  presented with decompensated HF.    -S/P LVAD    PAF (paroxysmal atrial fibrillation)  Known hx of pAF. In sinus rhythm on admission, but 1 run of AF RVR overnight. He spontaneously converted.  - Continue home amiodarone 400mg qd  - Continue AC, on hold with supratherapeutic INR       Acute renal failure with acute tubular necrosis superimposed on stage 3b chronic kidney disease  IVÁN on CKD2. Baseline Cr of 1.7-2.0.   - Hold ARNi  -Trend BMPs daily   -Nephrology following   -SLED/SCUF for volume removal began on 12/1. He is anuric  -Did not respond to diuretic challenge with 240 mg IV Lasix, 1000 mg Diuril, and 500 mg IV Diamox done 12/14  -Diuretic challenge  1/8 with 160 mg IVP Lasix,   -Now on HD TThSat; but last HD was on 1/10  -Tunneled trialysis line placed 12/22 by IR    Type 2 diabetes mellitus without complication, without long-term current use of insulin  -A1c 7.6, not insulin dependent  - Endocrine following, appreciate assistance with blood glucose management    CAD (coronary artery disease)  -S/p 3vCABG in 2009  -Lipitor on hold 2/2 mild transaminitis  -Not on ASA post VAD    Ventricular tachycardia  Hx VT s/p ICD placement (medtronic 2009)  - Continue home amio 400mg qd        ABBI AdamsC  Heart Transplant  [facility] - Cardiology Stepdown

## 2024-01-20 NOTE — PLAN OF CARE
Problem: Occupational Therapy  Goal: Occupational Therapy Goal  Description: Goals to be met by: 2/3/24    Patient will increase functional independence with ADLs by performing:    UB Dressing with SBA  LE Dressing with Supervision.  Grooming while standing at sink with Supervision.  Sit<stand with SBA using LRAD.   Toilet t/f to commode with SBA  [place] with VAD management during ADLs    Frequency decreased to 4x/wk due to pt's progress with therapy. Goals updated to correlate with pt progress.     Kristen Grijalva OT  1/20/2024    Outcome: Ongoing, Progressing

## 2024-01-20 NOTE — PROCEDURES
Interrogation of Ventricular assist device was performed with physician analysis of device parameters and review of device function. I have personally reviewed the interrogation findings and agree with findings as stated.   Procedure: Device Interrogation Including analysis of device parameters  Current Settings: Ventricular Assist Device  Review of device function is stable      1/20/2024     8:05 AM 1/20/2024     4:07 AM 1/19/2024     8:10 PM 1/19/2024     4:12 PM 1/19/2024    11:59 AM 1/19/2024     7:47 AM 1/19/2024     5:35 AM   TXP LVAD INTERROGATIONS   Type HeartMate3 HeartMate3 HeartMate3 HeartMate3 HeartMate3 HeartMate3 HeartMate3   Flow 3.5 3.4 3.5 3.3 3.7 3.8 3.5   Speed 4900 4900 4900 4900 4900 4900 4900   PI 5.9 6.9 5.7 6.2 5.2 7.1 6.5   Power ([place]) 3.3 3.3 3.2 3.2 3.3 3.2 3.3   LSL 4500 4500 4500 4500 4500 4500 4500   Low Flow Alarm    no no no    High Power Alarm    no no no    Pulsatility  Intermittent pulse Intermittent pulse Intermittent pulse Intermittent pulse Intermittent pulse       Brayan Ocampo MD

## 2024-01-20 NOTE — NURSING
Noticed old dried drainage at exit site. Patient's wife said she had never seen it. She requested to have the dressing changed tomorrow at its due time by the RN. Informed Dr. López about the drainage and wife's request to wait till tomorrow to try to see if it can be swabbed.

## 2024-01-20 NOTE — ASSESSMENT & PLAN NOTE
IVÁN on CKD2. Baseline Cr of 1.7-2.0.   - Hold ARNi  -Trend BMPs daily   -Nephrology following   -SLED/SCUF for volume removal began on 12/1. He is anuric  -Did not respond to diuretic challenge with 240 mg IV Lasix, 1000 mg Diuril, and 500 mg IV Diamox done 12/14  -Diuretic challenge 1/8 with 160 mg IVP Lasix,   -Now on HD TThSat; but last HD was on 1/10  -Tunneled trialysis line placed 12/22 by MILAN

## 2024-01-20 NOTE — PT/OT/SLP PROGRESS
Occupational Therapy   Treatment    Name: Radha Abbott  MRN: [id number]  Admitting Diagnosis:  LVAD (left ventricular assist device) present  2 Days Post-Op    Recommendations:     Discharge Recommendations: High Intensity Therapy  Discharge Equipment Recommendations:  walker, rolling, wheelchair  Barriers to discharge:  None    Assessment:     Radha Abbott is a 63 y.o. male with a medical diagnosis of LVAD (left ventricular assist device) present.  Pt presents with decreased endurance and impaired mobility performance as limited by cardiovascular status and generalized weakness.  Session focused on fx'l mobility today as pt found dressed, up in chair, and ready to mobilize this morning. Pt showing excellent progress today with frequency of care decreased given pt's progress. Pt continues to show deficits in safety with ascending from low level surfaces and required 1 seated break 2/2 poor endurance. Pt requesting w/c follow with pt's wife present and assisting during this session.  At this time, pt is a high fall risk and not at their baseline. Prior to hospitalizations, pt was mod I for ADLs/mobility now with prolonged hospitalization s/p LVAD implantation. Patient has demonstrated sufficient progression to warrant high intensity therapy evidenced by objectives noted below.    Performance deficits affecting function are weakness, impaired functional mobility, impaired endurance, gait instability, impaired self care skills, decreased safety awareness, decreased upper extremity function, decreased lower extremity function, impaired balance, impaired cardiopulmonary response to activity.     Rehab Prognosis:  Good; patient would benefit from acute skilled OT services to address these deficits and reach maximum level of function.       Plan:     Patient to be seen 4 x/week to address the above listed problems via self-care/home management, therapeutic activities, therapeutic exercises, neuromuscular re-education  Plan of  "Care Expires: 02/03/24  Plan of Care Reviewed with: patient, spouse    Subjective     Chief Complaint: "It's just you today?"  Patient/Family Comments/goals: return home   Pain/Comfort:  Pain Rating 1: 0/10  Pain Rating Post-Intervention 1: 0/10    Objective:     Communicated with: RN prior to session.  Patient found up in chair with LVAD, telemetry upon OT entry to room.    General Precautions: Standard, fall, LVAD    Orthopedic Precautions:N/A  Braces: N/A  Respiratory Status: Room air     Occupational Performance:     Bed Mobility:    nt    Functional Mobility/Transfers:  Patient completed Sit <> Stand Transfer with minimum assistance  with  rolling walker   Functional Mobility: Pt stood x1 from chair and x1 from wheelchair. Pt was min A for both t/f. Once in standing, pt mobilized in room and into hallway with w/c follow. Pt needed 1 seated break 2/2 fatigue. Pt using a RW for balance with cues needed for management.    Activities of Daily Living:  Lower Body Dressing: stand by assistance tying shoes in chair       [facility] 6 Click ADL: 20    Treatment & Education:  Pt educated on role of occupational therapy, POC, and safety during ADLs and functional mobility. Pt and OT discussed importance of safe, continued mobility to optimize daily living skills. Pt verbalized understanding.     White board updated during session. Pt given instruction to call for medical staff/nurse for assistance.       Patient left up in chair with all lines intact, call button in reach, RN notified, and pt's spouse present    GOALS:   Multidisciplinary Problems       Occupational Therapy Goals          Problem: Occupational Therapy    Goal Priority Disciplines Outcome Interventions   Occupational Therapy Goal     OT, PT/OT Ongoing, Progressing    Description: Goals to be met by: 2/3/24    Patient will increase functional independence with ADLs by performing:    UB Dressing with SBA  LE Dressing with Supervision.  Grooming while standing at " sink with Supervision.  Toileting from bedside commode with SBA for hygiene and clothing management.   Step transfer to bedside commode with SBA  [place] with VAD management during ADLs                             Time Tracking:     OT Date of Treatment: 01/20/24  OT Start Time: 0839  OT Stop Time: 0854  OT Total Time (min): 15 min    Billable Minutes:Therapeutic Activity 15 min    OT/PRIETO: OT     Number of PRIETO visits since last OT visit: 0    1/20/2024

## 2024-01-20 NOTE — NURSING
Nurses Note -- 4 Eyes      1/19/2024   6:45 PM      Skin assessed during: Q Shift Change      [] No Altered Skin Integrity Present    []Prevention Measures Documented      [x] Yes- Altered Skin Integrity Present or Discovered   [] LDA Added if Not in Epic (Describe Wound)   [] New Altered Skin Integrity was Present on Admit and Documented in LDA   [] Wound Image Taken    Wound Care Consulted? No    Attending Nurse:  Caryl Wilkins RN    Second RN/Staff Member:  Lashae Weber RN

## 2024-01-20 NOTE — PLAN OF CARE
Problem: Fluid and Electrolyte Imbalance (Acute Kidney Injury/Impairment)  Goal: Fluid and Electrolyte Balance  Outcome: Ongoing, Progressing     Problem: Renal Function Impairment (Acute Kidney Injury/Impairment)  Goal: Effective Renal Function  Outcome: Ongoing, Progressing     Problem: Adjustment to Illness (Heart Failure)  Goal: Optimal Coping  Outcome: Ongoing, Progressing

## 2024-01-20 NOTE — PROGRESS NOTES
01/20/24 0748   Vital Signs   BP (!) 92/0   BP Location Left arm   BP Method Doppler   Patient Position Sitting     Vero GUAMAN notified. No new orders at this time

## 2024-01-20 NOTE — PROGRESS NOTES
[facility] - Cardiology Stepdown  Wound Care    Patient Name:  Radha Abbott   MRN:  [id number]  Date: 1/19/2024  Diagnosis: LVAD (left ventricular assist device) present    History:     Past Medical History:   Diagnosis Date    CAD (coronary artery disease)     Diabetes mellitus     HFrEF (heart failure with reduced ejection fraction)     ICD (implantable cardioverter-defibrillator) in place     MI, old        Social History     Socioeconomic History    Marital status:    Tobacco Use    Smoking status: Every Day     Current packs/day: 0.50     Types: Cigarettes   Substance and Sexual Activity    Alcohol use: Yes     Comment: rarely    Drug use: No     Social Determinants of Health     Financial Resource Strain: Low Risk  (10/27/2023)    Overall Financial Resource Strain (CARDIA)     Difficulty of Paying Living Expenses: Not hard at all   Food Insecurity: No Food Insecurity (10/27/2023)    Hunger Vital Sign     Worried About Running Out of Food in the Last Year: Never true     Ran Out of Food in the Last Year: Never true   Transportation Needs: No Transportation Needs (10/27/2023)    PRAPARE - Transportation     Lack of Transportation (Medical): No     Lack of Transportation (Non-Medical): No   Physical Activity: Inactive (10/27/2023)    Exercise Vital Sign     Days of Exercise per Week: 0 days     Minutes of Exercise per Session: 0 min   Stress: No Stress Concern Present (10/27/2023)    [place] [employer] - Occupational Stress Questionnaire     Feeling of Stress : Not at all   Social Connections: Moderately Integrated (10/27/2023)    Social Connection and Isolation Panel [NHANES]     Frequency of Communication with Friends and Family: More than three times a week     Frequency of Social Gatherings with Friends and Family: More than three times a week     Attends Caodaism Services: More than 4 times per year     Active Member of Clubs or Organizations: No     Attends Club or Organization  Meetings: Never     Marital Status:    Housing Stability: Low Risk  (10/27/2023)    Housing Stability Vital Sign     Unable to Pay for Housing in the Last Year: No     Number of Places Lived in the Last Year: 1     Unstable Housing in the Last Year: No       Precautions:     Allergies as of 11/09/2023    (No Known Allergies)       [facility] Assessment Details/Treatment   Patient seen for wound care follow up.   Reviewed chart for this encounter.   See Flow Sheet for findings.      Per chart review. Radha Abbott is a 63 y.o. male with ICM (LVEF 10-15% & G3 DD), CAD (s/p 3 vessel CABG 2009), DM II, HTN, HLD, h/o v fib and CKD3 who was admitted on 11/9 for CHF exacerbation. Patient was initially managed with dobutamine and diuresis, however ultimately underwent intra-aortic balloon pump placement on 12/1. Pt on Immerse with heels offloaded through use of pillow. Pt wife told wound care that she does pt wound care. Wound care assessed wounds noting significant improvement in healing from the last assessment. Pt denied any needs. Pt wife stated she could see the healing process.       RECOMMENDATIONS:  - Wound Orders to still stand.     Recommendations made to primary team for above plan via secured chat. Wound Care to follow up. Orders placed.     Discussed POC with patient and primary RN.   See EMR for orders & patient education.  Discussed POC with primary team.  ENRRIQUE Following.    Nursing to continue care.  Nursing to maintain pressure injury prevention interventions.  Contact wound care for any further questions.         01/19/24 1340   WOCN Assessment   WOCN Total Time (mins) 30   Visit Date 01/19/24   Visit Time 1340   Consult Type New   WOCN Speciality Wound   Intervention assessed;applied;chart review   Teaching on-going        Altered Skin Integrity 12/15/23 1500 Sacral spine #1 Purple or maroon localized area of discolored intact skin or non-intact skin or a blood-filled blister.   Date First Assessed/Time  First Assessed: 12/15/23 1500   Altered Skin Integrity Present on Admission - Did Patient arrive to the hospital with altered skin?: suspected hospital acquired  Location: Sacral spine  Wound Number: #1  Is this injury device ...   Wound Image    Dressing Appearance Open to air   Drainage Amount None   Appearance Pink;Dry   Care Cleansed with:;Soap and water   Dressing Applied;Other (comment)  ([place])   Dressing Change Due 01/19/24        Altered Skin Integrity 12/26/23 2000 Right lower;posterior Heel #1 Purple or maroon localized area of discolored intact skin or non-intact skin or a blood-filled blister.   Date First Assessed/Time First Assessed: 12/26/23 2000   Altered Skin Integrity Present on Admission - Did Patient arrive to the hospital with altered skin?: suspected hospital acquired  Side: Right  Orientation: lower;posterior  Location: Heel  Wound...   Wound Image    Dressing Appearance No dressing   Drainage Amount None   Appearance Pink;Red;Dry   Tissue loss description Partial thickness   Dressing   (BPCO per Order)   Dressing Change Due 01/19/24 01/19/2024

## 2024-01-20 NOTE — PROGRESS NOTES
"   01/20/24 1043        VAD 12/01/23 1015 Left ventricular assist device HeartMate 3   Placement Date/Time: 12/01/23 1015   Present Prior to Hospital Arrival?: No  VAD Type: Left ventricular assist device  VAD Brand: HeartMate 3   Site Location Abdomen right   Site Assessment Clean;Dry;Intact   Driveline Exit Site 2   Driveline Assessment Free of Kinks;Modular cable Clean and Locked;Intact   Dressing Status Clean;Dry;Intact   Dressing Intervention Sterile dressing change   Performed By Caregiver   Dressing Change Schedule Every 3 days   Dressing Change Due 01/23/24   Integrity dry;intact;no damage   Driveline Heaters in use [place] car[place]za   Condition CDI     LVAD dressing change completed by caregiver using sterile technique silver kit. DLES is a "2" with small amount of drainage noted on the drain sponge. Tolerated without any complication. Sutures remain intact, no redness, or tenderness noted.   "

## 2024-01-21 LAB
ALBUMIN SERPL BCP-MCNC: 2.3 G/DL (ref 3.5–5.2)
ALP SERPL-CCNC: 133 U/L (ref 55–135)
ALT SERPL W/O P-5'-P-CCNC: 26 U/L (ref 10–44)
ANION GAP SERPL CALC-SCNC: 8 MMOL/L (ref 8–16)
AST SERPL-CCNC: 21 U/L (ref 10–40)
BASOPHILS # BLD AUTO: 0.06 K/UL (ref 0–0.2)
BASOPHILS NFR BLD: 0.8 % (ref 0–1.9)
BILIRUB DIRECT SERPL-MCNC: 0.1 MG/DL (ref 0.1–0.3)
BILIRUB SERPL-MCNC: 0.3 MG/DL (ref 0.1–1)
BUN SERPL-MCNC: 59 MG/DL (ref 8–23)
CA-I BLDV-SCNC: 1.2 MMOL/L (ref 1.06–1.42)
CALCIUM SERPL-MCNC: 8.9 MG/DL (ref 8.7–10.5)
CHLORIDE SERPL-SCNC: 107 MMOL/L (ref 95–110)
CO2 SERPL-SCNC: 20 MMOL/L (ref 23–29)
CREAT SERPL-MCNC: 3.4 MG/DL (ref 0.5–1.4)
DIFFERENTIAL METHOD BLD: ABNORMAL
EOSINOPHIL # BLD AUTO: 0.3 K/UL (ref 0–0.5)
EOSINOPHIL NFR BLD: 4.4 % (ref 0–8)
ERYTHROCYTE [DISTWIDTH] IN BLOOD BY AUTOMATED COUNT: 17.2 % (ref 11.5–14.5)
EST. GFR  (NO RACE VARIABLE): 19.7 ML/MIN/1.73 M^2
GLUCOSE SERPL-MCNC: 111 MG/DL (ref 70–110)
HCT VFR BLD AUTO: 28.1 % (ref 40–54)
HGB BLD-MCNC: 8 G/DL (ref 14–18)
IMM GRANULOCYTES # BLD AUTO: 0.05 K/UL (ref 0–0.04)
IMM GRANULOCYTES NFR BLD AUTO: 0.7 % (ref 0–0.5)
INR PPP: 3 (ref 0.8–1.2)
LDH SERPL L TO P-CCNC: 334 U/L (ref 110–260)
LYMPHOCYTES # BLD AUTO: 0.8 K/UL (ref 1–4.8)
LYMPHOCYTES NFR BLD: 10.5 % (ref 18–48)
MAGNESIUM SERPL-MCNC: 1.8 MG/DL (ref 1.6–2.6)
MCH RBC QN AUTO: 27.7 PG (ref 27–31)
MCHC RBC AUTO-ENTMCNC: 28.5 G/DL (ref 32–36)
MCV RBC AUTO: 97 FL (ref 82–98)
MONOCYTES # BLD AUTO: 0.5 K/UL (ref 0.3–1)
MONOCYTES NFR BLD: 6.3 % (ref 4–15)
NEUTROPHILS # BLD AUTO: 5.8 K/UL (ref 1.8–7.7)
NEUTROPHILS NFR BLD: 77.3 % (ref 38–73)
NRBC BLD-RTO: 0 /100 WBC
PHOSPHATE SERPL-MCNC: 3.8 MG/DL (ref 2.7–4.5)
PLATELET # BLD AUTO: 447 K/UL (ref 150–450)
PMV BLD AUTO: 8.9 FL (ref 9.2–12.9)
POTASSIUM SERPL-SCNC: 5 MMOL/L (ref 3.5–5.1)
PROT SERPL-MCNC: 7.7 G/DL (ref 6–8.4)
PROTHROMBIN TIME: 30.1 SEC (ref 9–12.5)
RBC # BLD AUTO: 2.89 M/UL (ref 4.6–6.2)
SODIUM SERPL-SCNC: 135 MMOL/L (ref 136–145)
WBC # BLD AUTO: 7.5 K/UL (ref 3.9–12.7)

## 2024-01-21 PROCEDURE — 93750 INTERROGATION VAD IN PERSON: CPT | Mod: ,,, | Performed by: INTERNAL MEDICINE

## 2024-01-21 PROCEDURE — 99233 SBSQ HOSP IP/OBS HIGH 50: CPT | Mod: FS,,, | Performed by: INTERNAL MEDICINE

## 2024-01-21 PROCEDURE — 85025 COMPLETE CBC W/AUTO DIFF WBC: CPT | Performed by: PHYSICIAN ASSISTANT

## 2024-01-21 PROCEDURE — 25000003 PHARM REV CODE 250: Performed by: INTERNAL MEDICINE

## 2024-01-21 PROCEDURE — 27000248 HC VAD-ADDITIONAL DAY

## 2024-01-21 PROCEDURE — 85610 PROTHROMBIN TIME: CPT | Performed by: PHYSICIAN ASSISTANT

## 2024-01-21 PROCEDURE — 82330 ASSAY OF CALCIUM: CPT | Performed by: INTERNAL MEDICINE

## 2024-01-21 PROCEDURE — 20600001 HC STEP DOWN PRIVATE ROOM

## 2024-01-21 PROCEDURE — 80076 HEPATIC FUNCTION PANEL: CPT | Performed by: INTERNAL MEDICINE

## 2024-01-21 PROCEDURE — 83615 LACTATE (LD) (LDH) ENZYME: CPT | Performed by: STUDENT IN AN ORGANIZED HEALTH CARE EDUCATION/TRAINING PROGRAM

## 2024-01-21 PROCEDURE — 25000003 PHARM REV CODE 250: Performed by: PHYSICIAN ASSISTANT

## 2024-01-21 PROCEDURE — 83735 ASSAY OF MAGNESIUM: CPT | Performed by: INTERNAL MEDICINE

## 2024-01-21 PROCEDURE — 25000003 PHARM REV CODE 250: Performed by: STUDENT IN AN ORGANIZED HEALTH CARE EDUCATION/TRAINING PROGRAM

## 2024-01-21 PROCEDURE — 27000207 HC ISOLATION

## 2024-01-21 PROCEDURE — 80048 BASIC METABOLIC PNL TOTAL CA: CPT | Performed by: INTERNAL MEDICINE

## 2024-01-21 PROCEDURE — 36415 COLL VENOUS BLD VENIPUNCTURE: CPT | Performed by: INTERNAL MEDICINE

## 2024-01-21 PROCEDURE — 63600175 PHARM REV CODE 636 W HCPCS: Performed by: STUDENT IN AN ORGANIZED HEALTH CARE EDUCATION/TRAINING PROGRAM

## 2024-01-21 PROCEDURE — 84100 ASSAY OF PHOSPHORUS: CPT | Performed by: INTERNAL MEDICINE

## 2024-01-21 RX ORDER — OXYMETAZOLINE HCL 0.05 %
2 SPRAY, NON-AEROSOL (ML) NASAL 2 TIMES DAILY
Status: DISPENSED | OUTPATIENT
Start: 2024-01-21 | End: 2024-01-24

## 2024-01-21 RX ADMIN — GABAPENTIN 100 MG: 100 CAPSULE ORAL at 08:01

## 2024-01-21 RX ADMIN — ACETAMINOPHEN 1000 MG: 500 TABLET ORAL at 08:01

## 2024-01-21 RX ADMIN — HYDRALAZINE HYDROCHLORIDE 10 MG: 20 INJECTION, SOLUTION INTRAMUSCULAR; INTRAVENOUS at 09:01

## 2024-01-21 RX ADMIN — OXYMETAZOLINE HYDROCHLORIDE 2 SPRAY: 0.05 SPRAY NASAL at 11:01

## 2024-01-21 RX ADMIN — Medication 1 TUBE: at 08:01

## 2024-01-21 RX ADMIN — Medication: at 08:01

## 2024-01-21 RX ADMIN — ZOLPIDEM TARTRATE 5 MG: 5 TABLET ORAL at 09:01

## 2024-01-21 RX ADMIN — AMIODARONE HYDROCHLORIDE 400 MG: 200 TABLET ORAL at 08:01

## 2024-01-21 RX ADMIN — ACETAMINOPHEN 1000 MG: 500 TABLET ORAL at 03:01

## 2024-01-21 RX ADMIN — MIRTAZAPINE 7.5 MG: 7.5 TABLET, FILM COATED ORAL at 08:01

## 2024-01-21 RX ADMIN — FAMOTIDINE 20 MG: 20 TABLET ORAL at 08:01

## 2024-01-21 RX ADMIN — CHOLECALCIFEROL TAB 25 MCG (1000 UNIT) 1000 UNITS: 25 TAB at 08:01

## 2024-01-21 RX ADMIN — TRAZODONE HYDROCHLORIDE 25 MG: 50 TABLET ORAL at 08:01

## 2024-01-21 RX ADMIN — FLUTICASONE PROPIONATE 100 MCG: 50 SPRAY, METERED NASAL at 08:01

## 2024-01-21 RX ADMIN — VENLAFAXINE 37.5 MG: 37.5 TABLET ORAL at 08:01

## 2024-01-21 NOTE — PROGRESS NOTES
01/21/2024  Alondra Dykes    Current provider:  Sandhya Price MD    Device interrogation:      1/21/2024     3:39 PM 1/21/2024    11:44 AM 1/21/2024     7:41 AM 1/21/2024     4:07 AM 1/20/2024    11:13 PM 1/20/2024     7:59 PM 1/20/2024     4:30 PM   TXP LVAD INTERROGATIONS   Type HeartMate3 HeartMate3 HeartMate3 HeartMate3 HeartMate3 HeartMate3 HeartMate3   Flow 3.8 3.6 3.5 3.5 3.5 3.7 3.8   Speed 4900 4900 4900 4900 4900 4900 4900   PI 4.2 5.7 6.2 5.9 6 4.5 5.5   Power ([place]) 3.3 2.2 3.3 3.3 3.3 3.2 3.4   LSL 4500 4500 4500 4500 4500 4500 4500   Pulsatility    Intermittent pulse Intermittent pulse Intermittent pulse           Rounded on [facility] to ensure all mechanical assist device settings (IABP or VAD) were appropriate and all parameters were within limits.  I was able to ensure all back up equipment was present, the staff had no issues, and the Perfusion Department daily rounding was complete.      For implantable VADs: Interrogation of Ventricular assist device was performed with analysis of device parameters and review of device function. I have personally reviewed the interrogation findings and agree with findings as stated.     In emergency, the nursing units have been notified to contact the perfusion department either by:  Calling [phone] from 630am to 4pm Mon thru Fri, utilizing the On-Call Finder functionality of Epic and searching for Perfusion, or by contacting the hospital  from 4pm to 630am and on weekends and asking to speak with the perfusionist on call.    4:57 PM

## 2024-01-21 NOTE — PROGRESS NOTES
[facility] - Cardiology Stepdown  Heart Transplant  Progress Note    Patient Name: Radha Abbott  MRN: [id number]  Admission Date: 11/9/2023  Hospital Length of Stay: 73 days  Attending Physician: Sandhya Price MD  Primary Care Provider: Vasu Kong MD  Principal Problem:LVAD (left ventricular assist device) present    Subjective:   Interval History: NAEON. No complaints. No drainage noted with DLES dressing change yesterday. INR down to 3.0 this morning, will plan for RHC tomorrow.     Continuous Infusions:   sodium chloride 0.9%       Scheduled Meds:   acetaminophen  1,000 mg Per NG tube TID    alteplase  2 mg Intra-Catheter Once    amiodarone  400 mg Oral Daily    balsam peru-castor oiL   Topical (Top) BID    famotidine  20 mg Oral Daily    fluticasone propionate  2 spray Each Nostril Daily    gabapentin  100 mg Oral BID    mirtazapine  7.5 mg Oral Nightly    polyethylene glycol  17 g Oral Daily    psyllium husk (aspartame)  1 packet Oral Daily    senna-docusate 8.6-50 mg  2 tablet Oral Daily    traZODone  25 mg Oral QHS    venlafaxine  37.5 mg Oral Daily    vitamin D  1,000 Units Oral Daily     PRN Meds:0.9%  NaCl infusion (for blood administration), bisacodyL, dextrose 10%, dextrose 10%, dextrose 10%, dextrose 10%, glucagon (human recombinant), glucose, glucose, heparin (porcine), hydrALAZINE, insulin aspart U-100, ondansetron, Flushing PICC/Midline Protocol **AND** [DISCONTINUED] sodium chloride 0.9% **AND** sodium chloride 0.9%, traMADoL, zolpidem    Review of patient's allergies indicates:  No Known Allergies  Objective:     Vital Signs (Most Recent):  Temp: 98.2 °F (36.8 °C) (01/21/24 0741)  Pulse: 93 (01/21/24 0741)  Resp: 18 (01/21/24 0741)  BP: (!) 90/0 (01/21/24 0741)  SpO2: 96 % (01/21/24 0741) Vital Signs (24h Range):  Temp:  [97.6 °F (36.4 °C)-98.6 °F (37 °C)] 98.2 °F (36.8 °C)  Pulse:  [] 93  Resp:  [18] 18  SpO2:  [92 %-97 %] 96 %  BP: ()/(0-78) 90/0     Patient Vitals for the  past 72 hrs (Last 3 readings):   Weight   01/20/24 0748 72.6 kg (160 lb 0.9 oz)   01/19/24 0720 70.3 kg (155 lb)       Body mass index is 21.71 kg/m².      Intake/Output Summary (Last 24 hours) at 1/21/2024 0828  Last data filed at 1/21/2024 0656  Gross per 24 hour   Intake 937 ml   Output 725 ml   Net 212 ml         Hemodynamic Parameters:       Telemetry: SR       Physical Exam  Constitutional:       Comments: Cachectic, temporal wasting   HENT:      Head: Normocephalic and atraumatic.   Eyes:      Conjunctiva/sclera: Conjunctivae normal.      Pupils: Pupils are equal, round, and reactive to light.   Neck:      Comments: Do not appreciate elevated JVP  Cardiovascular:      Rate and Rhythm: Normal rate and regular rhythm.      Comments: Smooth VAD hum  Pulmonary:      Effort: Pulmonary effort is normal.      Breath sounds: Normal breath sounds.   Abdominal:      General: Bowel sounds are normal.      Palpations: Abdomen is soft.   Musculoskeletal:         General: No swelling. Normal range of motion.      Cervical back: Normal range of motion and neck supple.   Skin:     General: Skin is warm and dry.      Capillary Refill: Capillary refill takes 2 to 3 seconds.   Neurological:      General: No focal deficit present.      Mental Status: He is alert and oriented to person, place, and time.            Significant Labs:  CBC:  Recent Labs   Lab 01/19/24  0614 01/20/24  0256 01/21/24  0557   WBC 7.61 6.70 7.50   RBC 2.66* 2.49* 2.89*   HGB 7.5* 6.9* 8.0*   HCT 25.9* 23.7* 28.1*   * 405 447   MCV 97 95 97   MCH 28.2 27.7 27.7   MCHC 29.0* 29.1* 28.5*       BNP:  Recent Labs   Lab 01/15/24  0315 01/17/24  0541 01/19/24  0614   * 906* 1,278*       CMP:  Recent Labs   Lab 01/19/24  0614 01/20/24  0256 01/21/24  0557   GLU 82 73 111*   CALCIUM 8.9 8.5* 8.9   ALBUMIN 2.2* 2.0* 2.3*   PROT 7.3 6.8 7.7   * 137 135*   K 5.4* 4.9 5.0   CO2 21* 22* 20*    108 107   BUN 72* 66* 59*   CREATININE 3.9* 3.6*  3.4*   ALKPHOS 134 128 133   ALT 31 26 26   AST 25 22 21   BILITOT 0.3 0.3 0.3        Coagulation:   Recent Labs   Lab 01/19/24  0614 01/20/24  0256 01/21/24  0557   INR 3.9* 3.8* 3.0*       LDH:  Recent Labs   Lab 01/19/24  0614 01/20/24  0256 01/21/24  0557   * 309* 334*       Microbiology:  Microbiology Results (last 7 days)       Procedure Component Value Units Date/Time    AFB Culture & Smear [[id number]] Collected: 12/06/23 1532    Order Status: Completed Specimen: Body Fluid from Lung, Right Updated: 01/18/24 0928     AFB Culture & Smear Culture in progress      Culture in progress     AFB CULTURE STAIN No acid fast bacilli seen.    Narrative:      Bronchial Wash            I have reviewed all pertinent labs within the past 24 hours.    Estimated Creatinine Clearance: 23.4 mL/min (A) (based on SCr of 3.4 mg/dL (H)).    Diagnostic Results:  I have reviewed and interpreted all pertinent imaging results/findings within the past 24 hours.  Assessment and Plan:     63 year old male with hx of CAD s/p 3v CABG (unclear anatomy, 2009), ICM with a recent EF of 15-20% s/p ICD (medtronik 2009), DM2 (a1c 7.7), HTN, HLD, Vfib on amio who presents to the ED with CC of SOB     Pt was recently admitted to [facility] [place] as a transfer.  He was started on a Lasix gtt and did well.  Started on gDMT and discharged home on  5 with plans to follow up in HTS clinic for ongoing transplant evaluation at another facility.  He says that about a few days ago he started to notice LE swelling.  This turned into Nelson and orthopnea.  He says he can't walk to the bathroom without getting SOB.  He also complains of weight gain.  He takes torsemide 20mg BID at home and was told to trial additional Lasix which he did without any improvement.  He was rx metolazone but has not been filled.  He came to the ED     In the ED he was AF with stable VS on RA.  CBC showing chronic anemia.  CMP notable for acute on chronic CKD with baseline around 2.1 and  Cr now 2.6.  BNP elevated.  Lactate neg.  CXR showing pulmonary edema.  I evaluated the pt at the bedside.  Bedside TTE showing CVP >15.  He was subsequently admitted to the CCU for diuresis.     He was continued on his home  and was started on a lasix gtt, which he responded well to overnight (net -1700cc. He feels much better this morning as well. Our transplant coordinators have been working on insurance approval and he is now being transferred to [facility] service for transplant evaluation.     * LVAD (left ventricular assist device) present  -HeartMate 3 Implanted  12/1/2023  as DT  -HTS Primary  -Implanted by Dr. Washington  -Hold Coumadin, dosing by PharmD.  Goal INR 2.0-3.0 . Therapeutic today, but will continue to hold with RHC tomorrow  -Antiplatelets Not on ASA  -LDH is stable overall today. Will continue to monitor daily.  -Weaned off midodrine.  -Speed set at  4900   rpm, LSL 4500 rpm   -Interrogation notable for no events  -ECHO 1/2/24 mild TR, LVEDD 6.25 with HM3 set to 4900 rpm   -Not listed for OHTx, declined for OHTX due to comorbidities   -PT/OT/Speech. Recommending rehab but HD + LVAD may be barrier      Procedure: Device Interrogation Including analysis of device parameters  Current Settings: Ventricular Assist Device  Review of device function is stable/unstable stable        1/21/2024     7:41 AM 1/21/2024     4:07 AM 1/20/2024    11:13 PM 1/20/2024     7:59 PM 1/20/2024     4:30 PM 1/20/2024    11:36 AM 1/20/2024     8:05 AM   TXP LVAD INTERROGATIONS   Type HeartMate3 HeartMate3 HeartMate3 HeartMate3 HeartMate3 HeartMate3 HeartMate3   Flow 3.5 3.5 3.5 3.7 3.8 3.9 3.5   Speed 4900 4900 4900 4900 4900 4900 4900   PI 6.2 5.9 6 4.5 5.5 5.2 5.9   Power ([place]) 3.3 3.3 3.3 3.2 3.4 3.3 3.3   LSL 4500 4500 4500 4500 4500 4500 4500   Pulsatility  Intermittent pulse Intermittent pulse Intermittent pulse            Right parotid mass  - 2.2cm R. Parotid mass noted.   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 Can follow up as outpatient w/ ENT for FNA. No further interventions needed as inpatient.     Lymphadenopathy  +ill contacts  Respiratory panel negative  Strep test negative.   Throat culture showing pseudomonas. As per ID , will treat w/ 5 day course of zosyn (therapy completed 1/10)    Unilateral complete vocal fold paralysis  -Appreciate ENT's help  -S/P augmentation of paralyzed left vocal cord 12/18    Dysphonia  -Speech following closely  -Appreciate ENT's help. S/P augmentation of paralyzed left vocal cord 12/18    Moderate malnutrition  -RD and SLP following  -Tolerating tube feeds. Total daily caloric intake increased to 2160  -Failed MBBS 12/20, continue TF.  -Speech following, appreciate their continued recommendations     Depressed mood  -Psychiatry consulted for depressed mood, SI when left alone  -Recommend sitter when alone (possibly with transition to stepdown).  -Psych reconsulted 1/2, agree with venlafaxine and trazadone    IVÁN (acute kidney injury)  -Nephrology following      Acute on chronic combined systolic and diastolic CHF, NYHA class 4  Pt with known ICM with an EF of 25% on home  presented with decompensated HF.    -S/P LVAD    PAF (paroxysmal atrial fibrillation)  Known hx of pAF. In sinus rhythm on admission, but 1 run of AF RVR overnight. He spontaneously converted.  - Continue home amiodarone 400mg qd  - Continue AC, on hold with recently supratherapeutic INR and procedure planned for tomorrow       Acute renal failure with acute tubular necrosis superimposed on stage 3b chronic kidney disease  IVÁN on CKD2. Baseline Cr of 1.7-2.0.   - Hold ARNi  -Trend BMPs daily   -Nephrology following   -SLED/SCUF for volume removal began on 12/1. He is anuric  -Did not respond to diuretic challenge with 240 mg IV Lasix, 1000 mg Diuril, and 500 mg IV Diamox done 12/14  -Diuretic challenge 1/8 with 160 mg IVP Lasix,   -Now on HD TThSat; but last HD was on 1/10  -Tunneled  trialysis line placed 12/22 by IR    Type 2 diabetes mellitus without complication, without long-term current use of insulin  -A1c 7.6, not insulin dependent  - Endocrine following, appreciate assistance with blood glucose management    CAD (coronary artery disease)  -S/p 3vCABG in 2009  -Lipitor on hold 2/2 mild transaminitis  -Not on ASA post VAD    Ventricular tachycardia  Hx VT s/p ICD placement (medtronic 2009)  - Continue home amio 400mg qd        ABBI AdamsC  Heart Transplant  [facility] - Cardiology Stepdown

## 2024-01-21 NOTE — PLAN OF CARE
Plan of care discussed with patient. Patient AOX4 and VS stable. Patient ambulating with assistx1, fall precautions in place. LVAD DP and numbers WNL, smooth LVAD hum. Patient has no complaints of pain. Discussed medications and care. Patient has no questions at this time.

## 2024-01-21 NOTE — SUBJECTIVE & OBJECTIVE
Interval History: NAEON. No complaints. No drainage noted with DLES dressing change yesterday. INR down to 3.0 this morning, will plan for RHC tomorrow.     Continuous Infusions:   sodium chloride 0.9%       Scheduled Meds:   acetaminophen  1,000 mg Per NG tube TID    alteplase  2 mg Intra-Catheter Once    amiodarone  400 mg Oral Daily    balsam peru-castor oiL   Topical (Top) BID    famotidine  20 mg Oral Daily    fluticasone propionate  2 spray Each Nostril Daily    gabapentin  100 mg Oral BID    mirtazapine  7.5 mg Oral Nightly    polyethylene glycol  17 g Oral Daily    psyllium husk (aspartame)  1 packet Oral Daily    senna-docusate 8.6-50 mg  2 tablet Oral Daily    traZODone  25 mg Oral QHS    venlafaxine  37.5 mg Oral Daily    vitamin D  1,000 Units Oral Daily     PRN Meds:0.9%  NaCl infusion (for blood administration), bisacodyL, dextrose 10%, dextrose 10%, dextrose 10%, dextrose 10%, glucagon (human recombinant), glucose, glucose, heparin (porcine), hydrALAZINE, insulin aspart U-100, ondansetron, Flushing PICC/Midline Protocol **AND** [DISCONTINUED] sodium chloride 0.9% **AND** sodium chloride 0.9%, traMADoL, zolpidem    Review of patient's allergies indicates:  No Known Allergies  Objective:     Vital Signs (Most Recent):  Temp: 98.2 °F (36.8 °C) (01/21/24 0741)  Pulse: 93 (01/21/24 0741)  Resp: 18 (01/21/24 0741)  BP: (!) 90/0 (01/21/24 0741)  SpO2: 96 % (01/21/24 0741) Vital Signs (24h Range):  Temp:  [97.6 °F (36.4 °C)-98.6 °F (37 °C)] 98.2 °F (36.8 °C)  Pulse:  [] 93  Resp:  [18] 18  SpO2:  [92 %-97 %] 96 %  BP: ()/(0-78) 90/0     Patient Vitals for the past 72 hrs (Last 3 readings):   Weight   01/20/24 0748 72.6 kg (160 lb 0.9 oz)   01/19/24 0720 70.3 kg (155 lb)       Body mass index is 21.71 kg/m².      Intake/Output Summary (Last 24 hours) at 1/21/2024 0828  Last data filed at 1/21/2024 0656  Gross per 24 hour   Intake 937 ml   Output 725 ml   Net 212 ml         Hemodynamic Parameters:        Telemetry: SR       Physical Exam  Constitutional:       Comments: Cachectic, temporal wasting   HENT:      Head: Normocephalic and atraumatic.   Eyes:      Conjunctiva/sclera: Conjunctivae normal.      Pupils: Pupils are equal, round, and reactive to light.   Neck:      Comments: Do not appreciate elevated JVP  Cardiovascular:      Rate and Rhythm: Normal rate and regular rhythm.      Comments: Smooth VAD hum  Pulmonary:      Effort: Pulmonary effort is normal.      Breath sounds: Normal breath sounds.   Abdominal:      General: Bowel sounds are normal.      Palpations: Abdomen is soft.   Musculoskeletal:         General: No swelling. Normal range of motion.      Cervical back: Normal range of motion and neck supple.   Skin:     General: Skin is warm and dry.      Capillary Refill: Capillary refill takes 2 to 3 seconds.   Neurological:      General: No focal deficit present.      Mental Status: He is alert and oriented to person, place, and time.            Significant Labs:  CBC:  Recent Labs   Lab 01/19/24  0614 01/20/24  0256 01/21/24  0557   WBC 7.61 6.70 7.50   RBC 2.66* 2.49* 2.89*   HGB 7.5* 6.9* 8.0*   HCT 25.9* 23.7* 28.1*   * 405 447   MCV 97 95 97   MCH 28.2 27.7 27.7   MCHC 29.0* 29.1* 28.5*       BNP:  Recent Labs   Lab 01/15/24  0315 01/17/24  0541 01/19/24  0614   * 906* 1,278*       CMP:  Recent Labs   Lab 01/19/24  0614 01/20/24  0256 01/21/24  0557   GLU 82 73 111*   CALCIUM 8.9 8.5* 8.9   ALBUMIN 2.2* 2.0* 2.3*   PROT 7.3 6.8 7.7   * 137 135*   K 5.4* 4.9 5.0   CO2 21* 22* 20*    108 107   BUN 72* 66* 59*   CREATININE 3.9* 3.6* 3.4*   ALKPHOS 134 128 133   ALT 31 26 26   AST 25 22 21   BILITOT 0.3 0.3 0.3        Coagulation:   Recent Labs   Lab 01/19/24  0614 01/20/24  0256 01/21/24  0557   INR 3.9* 3.8* 3.0*       LDH:  Recent Labs   Lab 01/19/24  0614 01/20/24  0256 01/21/24  0557   * 309* 334*       Microbiology:  Microbiology Results (last 7 days)        Procedure Component Value Units Date/Time    AFB Culture & Smear [[id number]] Collected: 12/06/23 1532    Order Status: Completed Specimen: Body Fluid from Lung, Right Updated: 01/18/24 0928     AFB Culture & Smear Culture in progress      Culture in progress     AFB CULTURE STAIN No acid fast bacilli seen.    Narrative:      Bronchial Wash            I have reviewed all pertinent labs within the past 24 hours.    Estimated Creatinine Clearance: 23.4 mL/min (A) (based on SCr of 3.4 mg/dL (H)).    Diagnostic Results:  I have reviewed and interpreted all pertinent imaging results/findings within the past 24 hours.

## 2024-01-21 NOTE — ASSESSMENT & PLAN NOTE
Known hx of pAF. In sinus rhythm on admission, but 1 run of AF RVR overnight. He spontaneously converted.  - Continue home amiodarone 400mg qd  - Continue AC, on hold with recently supratherapeutic INR and procedure planned for tomorrow

## 2024-01-21 NOTE — PLAN OF CARE
Problem: Fluid and Electrolyte Imbalance (Acute Kidney Injury/Impairment)  Goal: Fluid and Electrolyte Balance  Outcome: Ongoing, Progressing     Problem: Oral Intake Inadequate (Acute Kidney Injury/Impairment)  Goal: Optimal Nutrition Intake  Outcome: Ongoing, Progressing     Problem: Renal Function Impairment (Acute Kidney Injury/Impairment)  Goal: Effective Renal Function  Outcome: Ongoing, Progressing     Problem: Adjustment to Illness (Heart Failure)  Goal: Optimal Coping  Outcome: Ongoing, Progressing     Problem: Fluid Imbalance (Heart Failure)  Goal: Fluid Balance  Outcome: Ongoing, Progressing      n/a

## 2024-01-21 NOTE — ASSESSMENT & PLAN NOTE
-HeartMate 3 Implanted  12/1/2023  as DT  -HTS Primary  -Implanted by Dr. Washington  -Hold Coumadin, dosing by PharmD.  Goal INR 2.0-3.0 . Therapeutic today, but will continue to hold with RHC tomorrow  -Antiplatelets Not on ASA  -LDH is stable overall today. Will continue to monitor daily.  -Weaned off midodrine.  -Speed set at  4900   rpm, LSL 4500 rpm   -Interrogation notable for no events  -ECHO 1/2/24 mild TR, LVEDD 6.25 with HM3 set to 4900 rpm   -Not listed for OHTx, declined for OHTX due to comorbidities   -PT/OT/Speech. Recommending rehab but HD + LVAD may be barrier      Procedure: Device Interrogation Including analysis of device parameters  Current Settings: Ventricular Assist Device  Review of device function is stable/unstable stable        1/21/2024     7:41 AM 1/21/2024     4:07 AM 1/20/2024    11:13 PM 1/20/2024     7:59 PM 1/20/2024     4:30 PM 1/20/2024    11:36 AM 1/20/2024     8:05 AM   TXP LVAD INTERROGATIONS   Type HeartMate3 HeartMate3 HeartMate3 HeartMate3 HeartMate3 HeartMate3 HeartMate3   Flow 3.5 3.5 3.5 3.7 3.8 3.9 3.5   Speed 4900 4900 4900 4900 4900 4900 4900   PI 6.2 5.9 6 4.5 5.5 5.2 5.9   Power ([place]) 3.3 3.3 3.3 3.2 3.4 3.3 3.3   LSL 4500 4500 4500 4500 4500 4500 4500   Pulsatility  Intermittent pulse Intermittent pulse Intermittent pulse

## 2024-01-22 PROBLEM — Z99.2 ANEMIA DUE TO CHRONIC KIDNEY DISEASE, ON CHRONIC DIALYSIS: Status: ACTIVE | Noted: 2024-01-22

## 2024-01-22 PROBLEM — D63.1 ANEMIA DUE TO CHRONIC KIDNEY DISEASE, ON CHRONIC DIALYSIS: Status: ACTIVE | Noted: 2024-01-22

## 2024-01-22 PROBLEM — N18.6 ANEMIA DUE TO CHRONIC KIDNEY DISEASE, ON CHRONIC DIALYSIS: Status: ACTIVE | Noted: 2024-01-22

## 2024-01-22 LAB
ALBUMIN SERPL BCP-MCNC: 2.1 G/DL (ref 3.5–5.2)
ALP SERPL-CCNC: 115 U/L (ref 55–135)
ALT SERPL W/O P-5'-P-CCNC: 21 U/L (ref 10–44)
ANION GAP SERPL CALC-SCNC: 8 MMOL/L (ref 8–16)
AST SERPL-CCNC: 17 U/L (ref 10–40)
BASOPHILS # BLD AUTO: 0.05 K/UL (ref 0–0.2)
BASOPHILS NFR BLD: 0.8 % (ref 0–1.9)
BILIRUB DIRECT SERPL-MCNC: 0.1 MG/DL (ref 0.1–0.3)
BILIRUB SERPL-MCNC: 0.2 MG/DL (ref 0.1–1)
BNP SERPL-MCNC: 1242 PG/ML (ref 0–99)
BUN SERPL-MCNC: 62 MG/DL (ref 8–23)
CALCIUM SERPL-MCNC: 8.5 MG/DL (ref 8.7–10.5)
CHLORIDE SERPL-SCNC: 110 MMOL/L (ref 95–110)
CO2 SERPL-SCNC: 19 MMOL/L (ref 23–29)
CREAT SERPL-MCNC: 3.3 MG/DL (ref 0.5–1.4)
CRP SERPL-MCNC: 67.7 MG/L (ref 0–8.2)
DIFFERENTIAL METHOD BLD: ABNORMAL
EOSINOPHIL # BLD AUTO: 0.2 K/UL (ref 0–0.5)
EOSINOPHIL NFR BLD: 3.8 % (ref 0–8)
ERYTHROCYTE [DISTWIDTH] IN BLOOD BY AUTOMATED COUNT: 17.7 % (ref 11.5–14.5)
EST. GFR  (NO RACE VARIABLE): 20.4 ML/MIN/1.73 M^2
GLUCOSE SERPL-MCNC: 81 MG/DL (ref 70–110)
HCT VFR BLD AUTO: 20.8 % (ref 40–54)
HGB BLD-MCNC: 6.2 G/DL (ref 14–18)
IMM GRANULOCYTES # BLD AUTO: 0.03 K/UL (ref 0–0.04)
IMM GRANULOCYTES NFR BLD AUTO: 0.5 % (ref 0–0.5)
INR PPP: 2.5 (ref 0.8–1.2)
LDH SERPL L TO P-CCNC: 318 U/L (ref 110–260)
LYMPHOCYTES # BLD AUTO: 0.9 K/UL (ref 1–4.8)
LYMPHOCYTES NFR BLD: 14.1 % (ref 18–48)
MAGNESIUM SERPL-MCNC: 1.7 MG/DL (ref 1.6–2.6)
MCH RBC QN AUTO: 27.9 PG (ref 27–31)
MCHC RBC AUTO-ENTMCNC: 29.8 G/DL (ref 32–36)
MCV RBC AUTO: 94 FL (ref 82–98)
MONOCYTES # BLD AUTO: 0.5 K/UL (ref 0.3–1)
MONOCYTES NFR BLD: 8.4 % (ref 4–15)
NEUTROPHILS # BLD AUTO: 4.4 K/UL (ref 1.8–7.7)
NEUTROPHILS NFR BLD: 72.4 % (ref 38–73)
NRBC BLD-RTO: 0 /100 WBC
PHOSPHATE SERPL-MCNC: 3.3 MG/DL (ref 2.7–4.5)
PLATELET # BLD AUTO: 477 K/UL (ref 150–450)
PMV BLD AUTO: 9.5 FL (ref 9.2–12.9)
POTASSIUM SERPL-SCNC: 5 MMOL/L (ref 3.5–5.1)
PREALB SERPL-MCNC: 30 MG/DL (ref 20–43)
PROT SERPL-MCNC: 6.7 G/DL (ref 6–8.4)
PROTHROMBIN TIME: 25.1 SEC (ref 9–12.5)
RBC # BLD AUTO: 2.22 M/UL (ref 4.6–6.2)
SODIUM SERPL-SCNC: 137 MMOL/L (ref 136–145)
WBC # BLD AUTO: 6.08 K/UL (ref 3.9–12.7)

## 2024-01-22 PROCEDURE — 25000003 PHARM REV CODE 250: Performed by: INTERNAL MEDICINE

## 2024-01-22 PROCEDURE — 63600175 PHARM REV CODE 636 W HCPCS: Performed by: STUDENT IN AN ORGANIZED HEALTH CARE EDUCATION/TRAINING PROGRAM

## 2024-01-22 PROCEDURE — 25000003 PHARM REV CODE 250: Performed by: PHYSICIAN ASSISTANT

## 2024-01-22 PROCEDURE — 97530 THERAPEUTIC ACTIVITIES: CPT

## 2024-01-22 PROCEDURE — 99233 SBSQ HOSP IP/OBS HIGH 50: CPT | Mod: ,,, | Performed by: PHYSICIAN ASSISTANT

## 2024-01-22 PROCEDURE — 93451 RIGHT HEART CATH: CPT | Performed by: INTERNAL MEDICINE

## 2024-01-22 PROCEDURE — 93451 RIGHT HEART CATH: CPT | Mod: 26,,, | Performed by: INTERNAL MEDICINE

## 2024-01-22 PROCEDURE — 86140 C-REACTIVE PROTEIN: CPT | Performed by: STUDENT IN AN ORGANIZED HEALTH CARE EDUCATION/TRAINING PROGRAM

## 2024-01-22 PROCEDURE — C1751 CATH, INF, PER/CENT/MIDLINE: HCPCS | Performed by: INTERNAL MEDICINE

## 2024-01-22 PROCEDURE — 85025 COMPLETE CBC W/AUTO DIFF WBC: CPT | Performed by: PHYSICIAN ASSISTANT

## 2024-01-22 PROCEDURE — 80076 HEPATIC FUNCTION PANEL: CPT | Performed by: INTERNAL MEDICINE

## 2024-01-22 PROCEDURE — 83880 ASSAY OF NATRIURETIC PEPTIDE: CPT | Performed by: STUDENT IN AN ORGANIZED HEALTH CARE EDUCATION/TRAINING PROGRAM

## 2024-01-22 PROCEDURE — 84100 ASSAY OF PHOSPHORUS: CPT | Performed by: INTERNAL MEDICINE

## 2024-01-22 PROCEDURE — 4A023N6 MEASUREMENT OF CARDIAC SAMPLING AND PRESSURE, RIGHT HEART, PERCUTANEOUS APPROACH: ICD-10-PCS | Performed by: INTERNAL MEDICINE

## 2024-01-22 PROCEDURE — 97535 SELF CARE MNGMENT TRAINING: CPT

## 2024-01-22 PROCEDURE — 27000248 HC VAD-ADDITIONAL DAY

## 2024-01-22 PROCEDURE — 80048 BASIC METABOLIC PNL TOTAL CA: CPT | Performed by: INTERNAL MEDICINE

## 2024-01-22 PROCEDURE — 99233 SBSQ HOSP IP/OBS HIGH 50: CPT | Mod: FS,,, | Performed by: INTERNAL MEDICINE

## 2024-01-22 PROCEDURE — 27000207 HC ISOLATION

## 2024-01-22 PROCEDURE — 93750 INTERROGATION VAD IN PERSON: CPT | Mod: ,,, | Performed by: INTERNAL MEDICINE

## 2024-01-22 PROCEDURE — 20600001 HC STEP DOWN PRIVATE ROOM

## 2024-01-22 PROCEDURE — 84134 ASSAY OF PREALBUMIN: CPT | Performed by: NURSE PRACTITIONER

## 2024-01-22 PROCEDURE — 25000003 PHARM REV CODE 250: Performed by: STUDENT IN AN ORGANIZED HEALTH CARE EDUCATION/TRAINING PROGRAM

## 2024-01-22 PROCEDURE — 85610 PROTHROMBIN TIME: CPT | Performed by: PHYSICIAN ASSISTANT

## 2024-01-22 PROCEDURE — 63600175 PHARM REV CODE 636 W HCPCS: Performed by: PHYSICIAN ASSISTANT

## 2024-01-22 PROCEDURE — 36415 COLL VENOUS BLD VENIPUNCTURE: CPT | Performed by: NURSE PRACTITIONER

## 2024-01-22 PROCEDURE — 83735 ASSAY OF MAGNESIUM: CPT | Performed by: INTERNAL MEDICINE

## 2024-01-22 PROCEDURE — C1894 INTRO/SHEATH, NON-LASER: HCPCS | Performed by: INTERNAL MEDICINE

## 2024-01-22 PROCEDURE — 83615 LACTATE (LD) (LDH) ENZYME: CPT | Performed by: STUDENT IN AN ORGANIZED HEALTH CARE EDUCATION/TRAINING PROGRAM

## 2024-01-22 PROCEDURE — 92526 ORAL FUNCTION THERAPY: CPT

## 2024-01-22 RX ORDER — WARFARIN 2.5 MG/1
2.5 TABLET ORAL
Status: DISCONTINUED | OUTPATIENT
Start: 2024-01-23 | End: 2024-01-24 | Stop reason: HOSPADM

## 2024-01-22 RX ORDER — LIDOCAINE HYDROCHLORIDE AND EPINEPHRINE 10; 10 MG/ML; UG/ML
INJECTION, SOLUTION INFILTRATION; PERINEURAL
Status: DISCONTINUED | OUTPATIENT
Start: 2024-01-22 | End: 2024-01-23

## 2024-01-22 RX ORDER — LIDOCAINE HYDROCHLORIDE 20 MG/ML
INJECTION, SOLUTION INFILTRATION; PERINEURAL
Status: DISCONTINUED | OUTPATIENT
Start: 2024-01-22 | End: 2024-01-23

## 2024-01-22 RX ORDER — WARFARIN SODIUM 5 MG/1
5 TABLET ORAL
Status: DISCONTINUED | OUTPATIENT
Start: 2024-01-22 | End: 2024-01-24 | Stop reason: HOSPADM

## 2024-01-22 RX ADMIN — OXYMETAZOLINE HYDROCHLORIDE 2 SPRAY: 0.05 SPRAY NASAL at 08:01

## 2024-01-22 RX ADMIN — ACETAMINOPHEN 1000 MG: 500 TABLET ORAL at 08:01

## 2024-01-22 RX ADMIN — Medication: at 08:01

## 2024-01-22 RX ADMIN — FUROSEMIDE 160 MG: 10 INJECTION, SOLUTION INTRAMUSCULAR; INTRAVENOUS at 02:01

## 2024-01-22 RX ADMIN — TRAZODONE HYDROCHLORIDE 25 MG: 50 TABLET ORAL at 08:01

## 2024-01-22 RX ADMIN — GABAPENTIN 100 MG: 100 CAPSULE ORAL at 08:01

## 2024-01-22 RX ADMIN — VENLAFAXINE 37.5 MG: 37.5 TABLET ORAL at 08:01

## 2024-01-22 RX ADMIN — AMIODARONE HYDROCHLORIDE 400 MG: 200 TABLET ORAL at 08:01

## 2024-01-22 RX ADMIN — HYDRALAZINE HYDROCHLORIDE 10 MG: 20 INJECTION, SOLUTION INTRAMUSCULAR; INTRAVENOUS at 09:01

## 2024-01-22 RX ADMIN — FLUTICASONE PROPIONATE 100 MCG: 50 SPRAY, METERED NASAL at 08:01

## 2024-01-22 RX ADMIN — SENNOSIDES AND DOCUSATE SODIUM 2 TABLET: 8.6; 5 TABLET ORAL at 08:01

## 2024-01-22 RX ADMIN — FAMOTIDINE 20 MG: 20 TABLET ORAL at 08:01

## 2024-01-22 RX ADMIN — CHOLECALCIFEROL TAB 25 MCG (1000 UNIT) 1000 UNITS: 25 TAB at 08:01

## 2024-01-22 RX ADMIN — WARFARIN SODIUM 5 MG: 5 TABLET ORAL at 05:01

## 2024-01-22 RX ADMIN — ACETAMINOPHEN 1000 MG: 500 TABLET ORAL at 05:01

## 2024-01-22 NOTE — PROGRESS NOTES
[facility] Lab  Heart Transplant  Progress Note    Patient Name: Radha Abbott  MRN: [id number]  Admission Date: 11/9/2023  Hospital Length of Stay: 74 days  Attending Physician: Sajan Hurley, *  Primary Care Provider: Vasu Kong MD  Principal Problem:LVAD (left ventricular assist device) present    Subjective:   Interval History: NAEON. No complaints. RHC this morning with elevated filling pressures (RA 13, PCWP 22). Discussed with Nephrology, will give single dose of 160 mg IVP Lasix and plan to start torsemide 20 mg QD as maintenance diuretic possibly tomorrow.     Continuous Infusions:   sodium chloride 0.9%       Scheduled Meds:   acetaminophen  1,000 mg Per NG tube TID    amiodarone  400 mg Oral Daily    balsam peru-castor oiL   Topical (Top) BID    famotidine  20 mg Oral Daily    fluticasone propionate  2 spray Each Nostril Daily    gabapentin  100 mg Oral BID    oxymetazoline  2 spray Each Nostril BID    polyethylene glycol  17 g Oral Daily    psyllium husk (aspartame)  1 packet Oral Daily    senna-docusate 8.6-50 mg  2 tablet Oral Daily    traZODone  25 mg Oral QHS    venlafaxine  37.5 mg Oral Daily    vitamin D  1,000 Units Oral Daily    [START ON 1/23/2024] warfarin  2.5 mg Oral Every Tues, Thurs, Sat, Sun    warfarin  5 mg Oral Every Mon, Wed, Fri     PRN Meds:0.9%  NaCl infusion (for blood administration), bisacodyL, dextrose 10%, dextrose 10%, dextrose 10%, dextrose 10%, glucagon (human recombinant), glucose, glucose, heparin (porcine), hydrALAZINE, insulin aspart U-100, LIDOcaine HCL 20 mg/ml (2%), LIDOcaine-EPINEPHrine 1%-1:100,000, ondansetron, Flushing PICC/Midline Protocol **AND** [DISCONTINUED] sodium chloride 0.9% **AND** sodium chloride 0.9%, traMADoL, zolpidem    Review of patient's allergies indicates:  No Known Allergies  Objective:     Vital Signs (Most Recent):  Temp: 97.8 °F (36.6 °C) (01/22/24 0924)  Pulse: 91 (01/22/24 0300)  Resp: 16 (01/22/24 0924)  BP: (!) 96/0  (01/22/24 0808)  SpO2: 97 % (01/22/24 0300) Vital Signs (24h Range):  Temp:  [97.6 °F (36.4 °C)-98.5 °F (36.9 °C)] 97.8 °F (36.6 °C)  Pulse:  [] 91  Resp:  [16-18] 16  SpO2:  [93 %-97 %] 97 %  BP: ()/(0-81) 96/0     Patient Vitals for the past 72 hrs (Last 3 readings):   Weight   01/21/24 0900 73.6 kg (162 lb 4.1 oz)   01/20/24 0748 72.6 kg (160 lb 0.9 oz)       Body mass index is 22.01 kg/m².      Intake/Output Summary (Last 24 hours) at 1/22/2024 1108  Last data filed at 1/21/2024 1848  Gross per 24 hour   Intake 799 ml   Output 150 ml   Net 649 ml         Hemodynamic Parameters:       Telemetry: SR       Physical Exam  Constitutional:       Comments: Cachectic, temporal wasting   HENT:      Head: Normocephalic and atraumatic.   Eyes:      Conjunctiva/sclera: Conjunctivae normal.      Pupils: Pupils are equal, round, and reactive to light.   Neck:      Comments: Do not appreciate elevated JVP  Cardiovascular:      Rate and Rhythm: Normal rate and regular rhythm.      Comments: Smooth VAD hum  Pulmonary:      Effort: Pulmonary effort is normal.      Breath sounds: Normal breath sounds.   Abdominal:      General: Bowel sounds are normal.      Palpations: Abdomen is soft.   Musculoskeletal:         General: No swelling. Normal range of motion.      Cervical back: Normal range of motion and neck supple.   Skin:     General: Skin is warm and dry.      Capillary Refill: Capillary refill takes 2 to 3 seconds.   Neurological:      General: No focal deficit present.      Mental Status: He is alert and oriented to person, place, and time.            Significant Labs:  CBC:  Recent Labs   Lab 01/20/24  0256 01/21/24  0557 01/22/24  0502   WBC 6.70 7.50 6.08   RBC 2.49* 2.89* 2.22*   HGB 6.9* 8.0* 6.2*   HCT 23.7* 28.1* 20.8*    447 477*   MCV 95 97 94   MCH 27.7 27.7 27.9   MCHC 29.1* 28.5* 29.8*       BNP:  Recent Labs   Lab 01/17/24  0541 01/19/24  0614 01/22/24  0502   * 1,278* 1,242*        CMP:  Recent Labs   Lab 01/20/24  0256 01/21/24  0557 01/22/24  0502   GLU 73 111* 81   CALCIUM 8.5* 8.9 8.5*   ALBUMIN 2.0* 2.3* 2.1*   PROT 6.8 7.7 6.7    135* 137   K 4.9 5.0 5.0   CO2 22* 20* 19*    107 110   BUN 66* 59* 62*   CREATININE 3.6* 3.4* 3.3*   ALKPHOS 128 133 115   ALT 26 26 21   AST 22 21 17   BILITOT 0.3 0.3 0.2        Coagulation:   Recent Labs   Lab 01/20/24  0256 01/21/24  0557 01/22/24  0502   INR 3.8* 3.0* 2.5*       LDH:  Recent Labs   Lab 01/20/24  0256 01/21/24  0557 01/22/24  0502   * 334* 318*       Microbiology:  Microbiology Results (last 7 days)       Procedure Component Value Units Date/Time    AFB Culture & Smear [[id number]] Collected: 12/06/23 1532    Order Status: Completed Specimen: Body Fluid from Lung, Right Updated: 01/18/24 0928     AFB Culture & Smear Culture in progress      Culture in progress     AFB CULTURE STAIN No acid fast bacilli seen.    Narrative:      Bronchial Wash            I have reviewed all pertinent labs within the past 24 hours.    Estimated Creatinine Clearance: 24.5 mL/min (A) (based on SCr of 3.3 mg/dL (H)).    Diagnostic Results:  I have reviewed and interpreted all pertinent imaging results/findings within the past 24 hours.  Assessment and Plan:     63 year old male with hx of CAD s/p 3v CABG (unclear anatomy, 2009), ICM with a recent EF of 15-20% s/p ICD (medtronik 2009), DM2 (a1c 7.7), HTN, HLD, Vfib on amio who presents to the ED with CC of SOB     [place] [facility].  He was started on a Lasix gtt and did well.  Started on gDMT and discharged home on  5 with plans to follow up in HTS clinic for ongoing transplant evaluation at another facility.  He says that about a few days ago he started to notice LE swelling.  This turned into Nelson and orthopnea.  He says he can't walk to the bathroom without getting SOB.  He also complains of weight gain.  He takes torsemide 20mg BID at home and was told to  trial additional Lasix which he did without any improvement.  He was rx metolazone but has not been filled.  He came to the ED     In the ED he was AF with stable VS on RA.  CBC showing chronic anemia.  CMP notable for acute on chronic CKD with baseline around 2.1 and Cr now 2.6.  BNP elevated.  Lactate neg.  CXR showing pulmonary edema.  I evaluated the pt at the bedside.  Bedside TTE showing CVP >15.  He was subsequently admitted to the CCU for diuresis.     He was continued on his home  and was started on a lasix gtt, which he responded well to overnight (net -1700cc. He feels much better this morning as well. Our transplant coordinators have been working on insurance approval and he is now being transferred to [facility] service for transplant evaluation.     * LVAD (left ventricular assist device) present  -HeartMate 3 Implanted  12/1/2023  as DT  -HTS Primary  -Implanted by Dr. Washington  -Resume Coumadin, dosing by PharmD.  Goal INR 2.0-3.0 . Therapeutic today  -Antiplatelets Not on ASA  -LDH is stable overall today. Will continue to monitor daily.  -Weaned off midodrine.  -Speed set at  4900   rpm, LSL 4500 rpm   -Interrogation notable for no events  -ECHO 1/2/24 mild TR, LVEDD 6.25 with HM3 set to 4900 rpm   -RHC 1/22/24 with RA 13, PCWP 22, PA 50/25, CO 5.8, CI 3.0   -Not listed for OHTx, declined for OHTX due to comorbidities   -PT/OT/Speech. Recommending rehab but HD + LVAD may be barrier      Procedure: Device Interrogation Including analysis of device parameters  Current Settings: Ventricular Assist Device  Review of device function is stable/unstable stable        1/22/2024     8:08 AM 1/22/2024     3:01 AM 1/22/2024    12:00 AM 1/21/2024     8:32 PM 1/21/2024     3:39 PM 1/21/2024    11:44 AM 1/21/2024     7:41 AM   TXP LVAD INTERROGATIONS   Type HeartMate3 HeartMate3 HeartMate3 HeartMate3 HeartMate3 HeartMate3 HeartMate3   Flow 3.2 3.5 3.6 3.5 3.8 3.6 3.5   Speed 5000 4950 4950 4900 4900 4900 4900   PI 7.6  6 5.2 5.5 4.2 5.7 6.2   Power ([place]) 3.3 3.2 3.2 3.3 3.3 2.2 3.3   LSL 4600 4500 4500 4500 4500 4500 4500   Low Flow Alarm  no no       High Power Alarm  no no       Pulsatility Intermittent pulse Intermittent pulse Intermittent pulse Intermittent pulse            Right parotid mass  - 2.2cm R. Parotid mass noted.   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 Can follow up as outpatient w/ ENT for FNA. No further interventions needed as inpatient.     Lymphadenopathy  +ill contacts  Respiratory panel negative  Strep test negative.   Throat culture showing pseudomonas. As per ID , will treat w/ 5 day course of zosyn (therapy completed 1/10)    Unilateral complete vocal fold paralysis  -Appreciate ENT's help  -S/P augmentation of paralyzed left vocal cord 12/18    Dysphonia  -Speech following closely  -Appreciate ENT's help. S/P augmentation of paralyzed left vocal cord 12/18    Moderate malnutrition  -RD and SLP following  -Tolerating tube feeds. Total daily caloric intake increased to 2160  -Failed MBBS 12/20, continue TF.  -Speech following, appreciate their continued recommendations     Depressed mood  -Psychiatry consulted for depressed mood, SI when left alone  -Recommend sitter when alone (possibly with transition to stepdown).  -Psych reconsulted 1/2, agree with venlafaxine and trazadone  -Remeron added as appetite stimulant, will discontinue as eating well and monitor response     IVÁN (acute kidney injury)  -Nephrology following      Acute on chronic combined systolic and diastolic CHF, NYHA class 4  Pt with known ICM with an EF of 25% on home  presented with decompensated HF.    -S/P LVAD    PAF (paroxysmal atrial fibrillation)  Known hx of pAF. In sinus rhythm on admission, but 1 run of AF RVR overnight. He spontaneously converted.  - Continue home amiodarone 400mg qd  - Continue AC      Acute renal failure with acute tubular necrosis superimposed on stage 3b chronic kidney disease  IVÁN on  CKD2. Baseline Cr of 1.7-2.0.   - Hold ARNi  -Trend BMPs daily   -Nephrology following   -SLED/SCUF for volume removal began on 12/1. He is anuric  -Did not respond to diuretic challenge with 240 mg IV Lasix, 1000 mg Diuril, and 500 mg IV Diamox done 12/14  -Diuretic challenge 1/8 with 160 mg IVP Lasix,  -Now on HD TThSat; but last HD was on 1/10  -Tunneled trialysis line placed 12/22 by IR  -C 1/22 with elevated filling pressures (RA 13, W 22). Discussed with Nephrology, will give 160 mg IVP Lasix and plan to start maintenance diuretics with torsemide 20 mg QD possibly tomorrow     Type 2 diabetes mellitus without complication, without long-term current use of insulin  -A1c 7.6, not insulin dependent  - Endocrine following, appreciate assistance with blood glucose management    CAD (coronary artery disease)  -S/p 3vCABG in 2009  -Lipitor on hold 2/2 mild transaminitis  -Not on ASA post VAD    Ventricular tachycardia  Hx VT s/p ICD placement (medtronic 2009)  - Continue home amio 400mg qd        Vero Lozada PA-C  Heart Transplant  [facility] - [facility]

## 2024-01-22 NOTE — BRIEF OP NOTE
[facility] Lab  Brief Operative Note  Cardiology    SUMMARY     Surgery Date: 1/22/2024     Surgeon(s) and Role:     * Brayan Ocampo MD - Primary    Assisting Surgeon: None    Pre-op Diagnosis:  LVAD (left ventricular assist device) present [Z95.811]    Post-op Diagnosis: Post-Op Diagnosis Codes:     * LVAD (left ventricular assist device) present [Z95.811]    Procedure Performed: RHC    Description of the findings of the procedure: RHC performed via the right IJ. Patient tolerated the procedure well. Elevated left and right side filling pressures (RA= 13 mm of Hg, PCWP=22 mm of Hg). Mild to moderate pulmonary HTN  (PA= 50/25 mm of Hg, PA mean=35 mm of Hg) in the setting of elevated left sided filling pressures. Normal high cardiac index and output  (CI=2.98 L/min/m2, CO=5.8 L/min) with HM3 support at LVAD speed of 4900 rpm.     Estimated Blood Loss: < 10 cc    Plan:   - Routine post-cath care.  - Recommend gentle IV diuresis/Echo to reassess LV /RV size and speed adjustment.     Brayan Ocampo MD

## 2024-01-22 NOTE — NURSING
Rounded on patient at bedside. Patient is A&Ox 4, sitting up in chair, spouse at bedside.  LVAD history interrogated with no abnormal events noted.  LVAD parameters currently WNL. Pt denies any needs at this time. Plan to d/c to rehab later this week. Reminded spouse not to remove any VAD equipment until d/c reconcilliation has been completed.   Verified that patient has an entire box of dressing supplies for discharge.  Encouraged pt to notify nurse if they have any questions, problems or concerns for LVAD coordinator.  Pt verbalized understanding and in agreement of plan.

## 2024-01-22 NOTE — PT/OT/SLP PROGRESS
Occupational Therapy      Patient Name:  Radha Abbott   MRN:  [id number]    Patient not seen today secondary to Off the floor for procedure/surgery ([facility] this AM). Will follow-up this PM.    1/22/2024

## 2024-01-22 NOTE — SUBJECTIVE & OBJECTIVE
Interval History: NAEON. No complaints. RHC this morning with elevated filling pressures (RA 13, PCWP 22). Discussed with Nephrology, will give single dose of 160 mg IVP Lasix and plan to start torsemide 20 mg QD as maintenance diuretic possibly tomorrow.     Continuous Infusions:   sodium chloride 0.9%       Scheduled Meds:   acetaminophen  1,000 mg Per NG tube TID    amiodarone  400 mg Oral Daily    balsam peru-castor oiL   Topical (Top) BID    famotidine  20 mg Oral Daily    fluticasone propionate  2 spray Each Nostril Daily    gabapentin  100 mg Oral BID    oxymetazoline  2 spray Each Nostril BID    polyethylene glycol  17 g Oral Daily    psyllium husk (aspartame)  1 packet Oral Daily    senna-docusate 8.6-50 mg  2 tablet Oral Daily    traZODone  25 mg Oral QHS    venlafaxine  37.5 mg Oral Daily    vitamin D  1,000 Units Oral Daily    [START ON 1/23/2024] warfarin  2.5 mg Oral Every Tues, Thurs, Sat, Sun    warfarin  5 mg Oral Every Mon, Wed, Fri     PRN Meds:0.9%  NaCl infusion (for blood administration), bisacodyL, dextrose 10%, dextrose 10%, dextrose 10%, dextrose 10%, glucagon (human recombinant), glucose, glucose, heparin (porcine), hydrALAZINE, insulin aspart U-100, LIDOcaine HCL 20 mg/ml (2%), LIDOcaine-EPINEPHrine 1%-1:100,000, ondansetron, Flushing PICC/Midline Protocol **AND** [DISCONTINUED] sodium chloride 0.9% **AND** sodium chloride 0.9%, traMADoL, zolpidem    Review of patient's allergies indicates:  No Known Allergies  Objective:     Vital Signs (Most Recent):  Temp: 97.8 °F (36.6 °C) (01/22/24 0924)  Pulse: 91 (01/22/24 0300)  Resp: 16 (01/22/24 0924)  BP: (!) 96/0 (01/22/24 0808)  SpO2: 97 % (01/22/24 0300) Vital Signs (24h Range):  Temp:  [97.6 °F (36.4 °C)-98.5 °F (36.9 °C)] 97.8 °F (36.6 °C)  Pulse:  [] 91  Resp:  [16-18] 16  SpO2:  [93 %-97 %] 97 %  BP: ()/(0-81) 96/0     Patient Vitals for the past 72 hrs (Last 3 readings):   Weight   01/21/24 0900 73.6 kg (162 lb 4.1 oz)    01/20/24 0748 72.6 kg (160 lb 0.9 oz)       Body mass index is 22.01 kg/m².      Intake/Output Summary (Last 24 hours) at 1/22/2024 1108  Last data filed at 1/21/2024 1848  Gross per 24 hour   Intake 799 ml   Output 150 ml   Net 649 ml         Hemodynamic Parameters:       Telemetry: SR       Physical Exam  Constitutional:       Comments: Cachectic, temporal wasting   HENT:      Head: Normocephalic and atraumatic.   Eyes:      Conjunctiva/sclera: Conjunctivae normal.      Pupils: Pupils are equal, round, and reactive to light.   Neck:      Comments: Do not appreciate elevated JVP  Cardiovascular:      Rate and Rhythm: Normal rate and regular rhythm.      Comments: Smooth VAD hum  Pulmonary:      Effort: Pulmonary effort is normal.      Breath sounds: Normal breath sounds.   Abdominal:      General: Bowel sounds are normal.      Palpations: Abdomen is soft.   Musculoskeletal:         General: No swelling. Normal range of motion.      Cervical back: Normal range of motion and neck supple.   Skin:     General: Skin is warm and dry.      Capillary Refill: Capillary refill takes 2 to 3 seconds.   Neurological:      General: No focal deficit present.      Mental Status: He is alert and oriented to person, place, and time.            Significant Labs:  CBC:  Recent Labs   Lab 01/20/24  0256 01/21/24  0557 01/22/24  0502   WBC 6.70 7.50 6.08   RBC 2.49* 2.89* 2.22*   HGB 6.9* 8.0* 6.2*   HCT 23.7* 28.1* 20.8*    447 477*   MCV 95 97 94   MCH 27.7 27.7 27.9   MCHC 29.1* 28.5* 29.8*       BNP:  Recent Labs   Lab 01/17/24  0541 01/19/24  0614 01/22/24  0502   * 1,278* 1,242*       CMP:  Recent Labs   Lab 01/20/24  0256 01/21/24  0557 01/22/24  0502   GLU 73 111* 81   CALCIUM 8.5* 8.9 8.5*   ALBUMIN 2.0* 2.3* 2.1*   PROT 6.8 7.7 6.7    135* 137   K 4.9 5.0 5.0   CO2 22* 20* 19*    107 110   BUN 66* 59* 62*   CREATININE 3.6* 3.4* 3.3*   ALKPHOS 128 133 115   ALT 26 26 21   AST 22 21 17   BILITOT 0.3  0.3 0.2        Coagulation:   Recent Labs   Lab 01/20/24  0256 01/21/24  0557 01/22/24  0502   INR 3.8* 3.0* 2.5*       LDH:  Recent Labs   Lab 01/20/24  0256 01/21/24  0557 01/22/24  0502   * 334* 318*       Microbiology:  Microbiology Results (last 7 days)       Procedure Component Value Units Date/Time    AFB Culture & Smear [[id number]] Collected: 12/06/23 1532    Order Status: Completed Specimen: Body Fluid from Lung, Right Updated: 01/18/24 0928     AFB Culture & Smear Culture in progress      Culture in progress     AFB CULTURE STAIN No acid fast bacilli seen.    Narrative:      Bronchial Wash            I have reviewed all pertinent labs within the past 24 hours.    Estimated Creatinine Clearance: 24.5 mL/min (A) (based on SCr of 3.3 mg/dL (H)).    Diagnostic Results:  I have reviewed and interpreted all pertinent imaging results/findings within the past 24 hours.

## 2024-01-22 NOTE — PROGRESS NOTES
Patient was seen today in the hospital. Patient awake at time of visit. Spouse at bedside. Patient feels confident about his knowledge of LVAD equipment. Patient correctly answered all questions asked regarding equipment. Patient and nurse (Wilmer) notified patient will be sleeping on his MPU tonight for power. Patient and Spouse educated on how to properly connect to the MPU. Will follow up with patient later this week.

## 2024-01-22 NOTE — PROGRESS NOTES
01/22/2024  [place] [facility]    Current provider:  Sandhya Price MD    Device interrogation:      1/22/2024     8:08 AM 1/22/2024     3:01 AM 1/22/2024    12:00 AM 1/21/2024     8:32 PM 1/21/2024     3:39 PM 1/21/2024    11:44 AM 1/21/2024     7:41 AM   TXP LVAD INTERROGATIONS   Type HeartMate3 HeartMate3 HeartMate3 HeartMate3 HeartMate3 HeartMate3 HeartMate3   Flow 3.2 3.5 3.6 3.5 3.8 3.6 3.5   Speed 5000 4950 4950 4900 4900 4900 4900   PI 7.6 6 5.2 5.5 4.2 5.7 6.2   Power ([place]) 3.3 3.2 3.2 3.3 3.3 2.2 3.3   LSL 4600 4500 4500 4500 4500 4500 4500   Low Flow Alarm  no no       High Power Alarm  no no       Pulsatility Intermittent pulse Intermittent pulse Intermittent pulse Intermittent pulse             Rounded on [facility] to ensure all mechanical assist device settings (IABP or VAD) were appropriate and all parameters were within limits.  I was able to ensure all back up equipment was present, the staff had no issues, and the Perfusion Department daily rounding was complete.      For implantable VADs: Interrogation of Ventricular assist device was performed with analysis of device parameters and review of device function. I have personally reviewed the interrogation findings and agree with findings as stated.     In emergency, the nursing units have been notified to contact the perfusion department either by:  Calling [phone] from 630am to 4pm Mon thru Fri, utilizing the On-Call Finder functionality of Epic and searching for Perfusion, or by contacting the hospital  from 4pm to 630am and on weekends and asking to speak with the perfusionist on call.    9:56 AM

## 2024-01-22 NOTE — ASSESSMENT & PLAN NOTE
-HeartMate 3 Implanted  12/1/2023  as DT  -HTS Primary  -Implanted by Dr. Washington  -Resume Coumadin, dosing by PharmD.  Goal INR 2.0-3.0 . Therapeutic today  -Antiplatelets Not on ASA  -LDH is stable overall today. Will continue to monitor daily.  -Weaned off midodrine.  -Speed set at  4900   rpm, LSL 4500 rpm   -Interrogation notable for no events  -ECHO 1/2/24 mild TR, LVEDD 6.25 with HM3 set to 4900 rpm   -RHC 1/22/24 with RA 13, PCWP 22, PA 50/25, CO 5.8, CI 3.0   -Not listed for OHTx, declined for OHTX due to comorbidities   -PT/OT/Speech. Recommending rehab but HD + LVAD may be barrier      Procedure: Device Interrogation Including analysis of device parameters  Current Settings: Ventricular Assist Device  Review of device function is stable/unstable stable        1/22/2024     8:08 AM 1/22/2024     3:01 AM 1/22/2024    12:00 AM 1/21/2024     8:32 PM 1/21/2024     3:39 PM 1/21/2024    11:44 AM 1/21/2024     7:41 AM   TXP LVAD INTERROGATIONS   Type HeartMate3 HeartMate3 HeartMate3 HeartMate3 HeartMate3 HeartMate3 HeartMate3   Flow 3.2 3.5 3.6 3.5 3.8 3.6 3.5   Speed 5000 4950 4950 4900 4900 4900 4900   PI 7.6 6 5.2 5.5 4.2 5.7 6.2   Power ([place]) 3.3 3.2 3.2 3.3 3.3 2.2 3.3   LSL 4600 4500 4500 4500 4500 4500 4500   Low Flow Alarm  no no       High Power Alarm  no no       Pulsatility Intermittent pulse Intermittent pulse Intermittent pulse Intermittent pulse

## 2024-01-22 NOTE — SUBJECTIVE & OBJECTIVE
Interval History:   Kidney function has improved over the weekend, SCR downt o 3.3 on AM labs, other electrolytes stable.  BNP 1242, had RHC this morning with RA= 13 mm of Hg, PCWP=22 mm of Hg.       Review of patient's allergies indicates:  No Known Allergies  Current Facility-Administered Medications   Medication Frequency    0.9%  NaCl infusion (for blood administration) Q24H PRN    0.9%  NaCl infusion Continuous    acetaminophen tablet 1,000 mg TID    amiodarone tablet 400 mg Daily    balsam peru-castor oiL Oint BID    bisacodyL suppository 10 mg Daily PRN    dextrose 10% bolus 125 mL 125 mL PRN    dextrose 10% bolus 125 mL 125 mL PRN    dextrose 10% bolus 250 mL 250 mL PRN    dextrose 10% bolus 250 mL 250 mL PRN    famotidine tablet 20 mg Daily    fluticasone propionate 50 mcg/actuation nasal spray 100 mcg Daily    furosemide (LASIX) 160 mg in sodium chloride 0.9% 100 mL IVPB Once    gabapentin capsule 100 mg BID    glucagon (human recombinant) injection 1 mg PRN    glucose chewable tablet 16 g PRN    glucose chewable tablet 24 g PRN    heparin (porcine) injection 1,000 Units PRN    hydrALAZINE injection 10 mg Q6H PRN    insulin aspart U-100 pen 0-5 Units QID (AC + HS) PRN    LIDOcaine HCL 20 mg/ml (2%) injection PRN    LIDOcaine-EPINEPHrine 1%-1:100,000 injection PRN    ondansetron injection 4 mg Q6H PRN    oxymetazoline 0.05 % nasal spray 2 spray BID    polyethylene glycol packet 17 g Daily    psyllium husk (aspartame) 3.4 gram packet 1 packet Daily    senna-docusate 8.6-50 mg per tablet 2 tablet Daily    sodium chloride 0.9% flush 10 mL PRN    traMADoL tablet 50 mg Q8H PRN    trazodone split tablet 25 mg QHS    venlafaxine tablet 37.5 mg Daily    vitamin D 1000 units tablet 1,000 Units Daily    [START ON 1/23/2024] warfarin (COUMADIN) tablet 2.5 mg Every Tues, Thurs, Sat, Sun    warfarin (COUMADIN) tablet 5 mg Every Mon, Wed, Fri    zolpidem tablet 5 mg Nightly PRN       Objective:     Vital Signs (Most  Recent):  Temp: 97.7 °F (36.5 °C) (01/22/24 1132)  Pulse: 86 (01/22/24 1132)  Resp: 18 (01/22/24 1132)  BP: (!) 82/0 (01/22/24 1157)  SpO2: 97 % (01/22/24 1132) Vital Signs (24h Range):  Temp:  [97.7 °F (36.5 °C)-98.5 °F (36.9 °C)] 97.7 °F (36.5 °C)  Pulse:  [] 86  Resp:  [16-18] 18  SpO2:  [94 %-97 %] 97 %  BP: ()/(0-81) 82/0     Weight: 73.6 kg (162 lb 4.1 oz) (01/21/24 0900)  Body mass index is 22.01 kg/m².  Body surface area is 1.93 meters squared.    I/O last 3 completed shifts:  In: 1277 [P.O.:1277]  Out: 600 [Urine:600]     Physical Exam  Vitals and nursing note reviewed.   Constitutional:       Comments: Cachectic, temporal wasting   HENT:      Head: Normocephalic and atraumatic.   Eyes:      Extraocular Movements: Extraocular movements intact.      Conjunctiva/sclera: Conjunctivae normal.   Neck:      Comments: Do not appreciate elevated JVP  Cardiovascular:      Rate and Rhythm: Normal rate and regular rhythm.      Comments: LVAD  Pulmonary:      Effort: Pulmonary effort is normal.      Breath sounds: Normal breath sounds.   Abdominal:      General: Bowel sounds are normal.      Palpations: Abdomen is soft.   Musculoskeletal:         General: No swelling. Normal range of motion.      Cervical back: Normal range of motion and neck supple.   Skin:     General: Skin is warm and dry.   Neurological:      General: No focal deficit present.      Mental Status: He is alert and oriented to person, place, and time.          Significant Labs:  CBC:   Recent Labs   Lab 01/22/24  0502   WBC 6.08   RBC 2.22*   HGB 6.2*   HCT 20.8*   *   MCV 94   MCH 27.9   MCHC 29.8*     CMP:   Recent Labs   Lab 01/22/24  0502   GLU 81   CALCIUM 8.5*   ALBUMIN 2.1*   PROT 6.7      K 5.0   CO2 19*      BUN 62*   CREATININE 3.3*   ALKPHOS 115   ALT 21   AST 17   BILITOT 0.2

## 2024-01-22 NOTE — PROGRESS NOTES
[facility] - [facility]  Nephrology  Progress Note    Patient Name: Radha Abbott  MRN: [id number]  Admission Date: 11/9/2023  Hospital Length of Stay: 74 days  Attending Provider: Sajan Hurley, *   Primary Care Physician: Vasu Kong MD  Principal Problem:LVAD (left ventricular assist device) present    Subjective:     HPI: Mr. Abbott is a 63-year-old man with ischemic cardiomyopathy with most recent TTE showing EF 10 -15% and G3DD s/p [employer] ICM placement on chronic dobutamine infusion, CAD s/p x3 vessel CABG back in 2009, type 2 diabetes mellitus (most recent hemoglobin A1c 7.6%), hypertension, HLD, history of ventricular fibrillation for which he is on amiodarone and CKD IIIb (baseline creatinine ~2-2.2) who was initially admitted on 11/9 with heart failure exacerbation and hypervolemia for which he was managed with inotrope with dobutamine in addition to IV diuresis. He ultimately underwent IABP placed on 11/21 for mechanical hemodynamic support and subsequently underwent LVAD placement on 12/1. His renal function appears to be mostly stable with IVÁN and creatinine in mid 2s to 3s in addition he is still making urine with Lasix infusion however on 12/5 Nephrology consulted given concern for uremia with BUN 72.     Interval History:   Kidney function has improved over the weekend, SCR downt o 3.3 on AM labs, other electrolytes stable.  BNP 1242, had RHC this morning with RA= 13 mm of Hg, PCWP=22 mm of Hg.       Review of patient's allergies indicates:  No Known Allergies  Current Facility-Administered Medications   Medication Frequency    0.9%  NaCl infusion (for blood administration) Q24H PRN    0.9%  NaCl infusion Continuous    acetaminophen tablet 1,000 mg TID    amiodarone tablet 400 mg Daily    balsam peru-castor oiL Oint BID    bisacodyL suppository 10 mg Daily PRN    dextrose 10% bolus 125 mL 125 mL PRN    dextrose 10% bolus 125 mL 125 mL PRN    dextrose 10% bolus 250 mL 250 mL PRN     dextrose 10% bolus 250 mL 250 mL PRN    famotidine tablet 20 mg Daily    fluticasone propionate 50 mcg/actuation nasal spray 100 mcg Daily    furosemide (LASIX) 160 mg in sodium chloride 0.9% 100 mL IVPB Once    gabapentin capsule 100 mg BID    glucagon (human recombinant) injection 1 mg PRN    glucose chewable tablet 16 g PRN    glucose chewable tablet 24 g PRN    heparin (porcine) injection 1,000 Units PRN    hydrALAZINE injection 10 mg Q6H PRN    insulin aspart U-100 pen 0-5 Units QID (AC + HS) PRN    LIDOcaine HCL 20 mg/ml (2%) injection PRN    LIDOcaine-EPINEPHrine 1%-1:100,000 injection PRN    ondansetron injection 4 mg Q6H PRN    oxymetazoline 0.05 % nasal spray 2 spray BID    polyethylene glycol packet 17 g Daily    psyllium husk (aspartame) 3.4 gram packet 1 packet Daily    senna-docusate 8.6-50 mg per tablet 2 tablet Daily    sodium chloride 0.9% flush 10 mL PRN    traMADoL tablet 50 mg Q8H PRN    trazodone split tablet 25 mg QHS    venlafaxine tablet 37.5 mg Daily    vitamin D 1000 units tablet 1,000 Units Daily    [START ON 1/23/2024] warfarin (COUMADIN) tablet 2.5 mg Every Tues, Thurs, Sat, Sun    warfarin (COUMADIN) tablet 5 mg Every Mon, Wed, Fri    zolpidem tablet 5 mg Nightly PRN       Objective:     Vital Signs (Most Recent):  Temp: 97.7 °F (36.5 °C) (01/22/24 1132)  Pulse: 86 (01/22/24 1132)  Resp: 18 (01/22/24 1132)  BP: (!) 82/0 (01/22/24 1157)  SpO2: 97 % (01/22/24 1132) Vital Signs (24h Range):  Temp:  [97.7 °F (36.5 °C)-98.5 °F (36.9 °C)] 97.7 °F (36.5 °C)  Pulse:  [] 86  Resp:  [16-18] 18  SpO2:  [94 %-97 %] 97 %  BP: ()/(0-81) 82/0     Weight: 73.6 kg (162 lb 4.1 oz) (01/21/24 0900)  Body mass index is 22.01 kg/m².  Body surface area is 1.93 meters squared.    I/O last 3 completed shifts:  In: 1277 [P.O.:1277]  Out: 600 [Urine:600]     Physical Exam  Vitals and nursing note reviewed.   Constitutional:       Comments: Cachectic, temporal wasting   HENT:      Head: Normocephalic  and atraumatic.   Eyes:      Extraocular Movements: Extraocular movements intact.      Conjunctiva/sclera: Conjunctivae normal.   Neck:      Comments: Do not appreciate elevated JVP  Cardiovascular:      Rate and Rhythm: Normal rate and regular rhythm.      Comments: LVAD  Pulmonary:      Effort: Pulmonary effort is normal.      Breath sounds: Normal breath sounds.   Abdominal:      General: Bowel sounds are normal.      Palpations: Abdomen is soft.   Musculoskeletal:         General: No swelling. Normal range of motion.      Cervical back: Normal range of motion and neck supple.   Skin:     General: Skin is warm and dry.   Neurological:      General: No focal deficit present.      Mental Status: He is alert and oriented to person, place, and time.          Significant Labs:  CBC:   Recent Labs   Lab 01/22/24  0502   WBC 6.08   RBC 2.22*   HGB 6.2*   HCT 20.8*   *   MCV 94   MCH 27.9   MCHC 29.8*     CMP:   Recent Labs   Lab 01/22/24  0502   GLU 81   CALCIUM 8.5*   ALBUMIN 2.1*   PROT 6.7      K 5.0   CO2 19*      BUN 62*   CREATININE 3.3*   ALKPHOS 115   ALT 21   AST 17   BILITOT 0.2      Assessment/Plan:     Renal/  Acute renal failure with acute tubular necrosis superimposed on stage 3b chronic kidney disease  - suspect acute tubular injury secondary to hypotension/cardiogenic shock likely compounded by intra-arterial contrast with urinary sediment with granular casts    Kidney function stable  -No acute indication for RRT   -Cystatin C - eGFR 13 which is likely improved from this original draw date.  -strict I/O's  -Continue to monitor for signs if renal recovery   -renal diet.   -for maintenance, recommend starting on Torsemide 20 mg, can also use metolazone to aid in volume optimization to achieve negative fluid goals.  -renally all dose medications to eGFR  -avoid nephrotoxic agents wean feasible (i.e. NSAIDs, intra-arterial contrast, supra-therapeutic vancomycin levels,  etc.)        Norm Vidal, NP  Nephrology  [facility]

## 2024-01-22 NOTE — ASSESSMENT & PLAN NOTE
-Psychiatry consulted for depressed mood, SI when left alone  -Recommend sitter when alone (possibly with transition to stepdown).  -Psych reconsulted 1/2, agree with venlafaxine and trazadone  -Remeron added as appetite stimulant, will discontinue as eating well and monitor response

## 2024-01-22 NOTE — PT/OT/SLP PROGRESS
Speech Language Pathology Treatment    Patient Name:  Radha Abbott   MRN:  [id number]  Admitting Diagnosis: LVAD (left ventricular assist device) present    Recommendations:                 General Recommendations:  Dysphagia therapy and Modified barium swallow study  Diet recommendations:  Minced & Moist Diet - IDDSI Level 5, Liquid Diet Level: Moderately thick liquids - IDDSI Level 3   all liquids should be thickened to a honey thick consistency- pt is a silent aspirator per most recent MBSS   Ice chips only with dysphagia exercises   Encourage double swallows between bites and sips     Assessment:     Radha Abbott is a 63 y.o. male with an SLP diagnosis of Dysphagia and Dysphonia.      Subjective   Pt awake and alert upright in bedside chair upon arrival     Pain/Comfort:  Pain Rating 1: 0/10  Pain Rating Post-Intervention 1: 0/10    Respiratory Status: Room air    Objective:     Has the patient been evaluated by SLP for swallowing?   Yes  Keep patient NPO? No   Current Respiratory Status:        SLP to bedside to discuss with Pt and spouse MBS having to be postponed this AM 2/2 right heart cath. Pt with evident wet vocal quality upon arrival warranting SLP cued throat clear. Vocal quality does remain mildly dysphonic (hoarse + breathy with periods of true phonation).     Spouse reporting pt has been tolerating an essentially unrestricted diet (regular solids and un thickened liquids) which pt and family have self -advanced . SLP emphasized that these have not been nor continue to be safest diet recommendations from prior MBS with documented silent aspiration from 1/3/24 (3 weeks ago) . SLP discussing will still need to obtain additional imaging to help determine true safety/readiness for diet advancement. Pending results from MBS pt and family may wish to advance diet regardless of aspiration risk. SLP discussed study able to added per radiology approx 1:00pm later this date. SLP again reviewing and encouraging  continue previously recommended diet until study completed.     Goals:   Multidisciplinary Problems       SLP Goals          Problem: SLP    Goal Priority Disciplines Outcome   SLP Goal     SLP Ongoing, Progressing   Description: Speech Language Pathology Goals  Goals expected to be met by 12/24    1. Pt will participate in ongoing assessment of swallow function to help determine least restrictive diet                            Plan:     Patient to be seen:  4 x/week   Plan of Care expires:     Plan of Care reviewed with:  patient, spouse   SLP Follow-Up:  Yes       Discharge recommendations:  High Intensity Therapy   Barriers to Discharge:  None    Time Tracking:     SLP Treatment Date:   01/22/24  Speech Start Time:  1002  Speech Stop Time:  1019     Speech Total Time (min):  17 min    Billable Minutes: Treatment Swallowing Dysfunction 8 and Self Care/Home Management Training 9    01/22/2024

## 2024-01-22 NOTE — INTERVAL H&P NOTE
Patient seen and examined. No interval change since last H&P. Here for a RHC to assess his hemodynamics.   Access via the right IJ  7 Fr venous sheath  Risks and benefits of the procedure were explained to the patient. All questions were answered.  Consents were signed and in the chart.    Brayan Ocampo MD

## 2024-01-22 NOTE — ASSESSMENT & PLAN NOTE
IVÁN on CKD2. Baseline Cr of 1.7-2.0.   - Hold ARNi  -Trend BMPs daily   -Nephrology following   -SLED/SCUF for volume removal began on 12/1. He is anuric  -Did not respond to diuretic challenge with 240 mg IV Lasix, 1000 mg Diuril, and 500 mg IV Diamox done 12/14  -Diuretic challenge 1/8 with 160 mg IVP Lasix,  -Now on HD TThSat; but last HD was on 1/10  -Tunneled trialysis line placed 12/22 by IR  -C 1/22 with elevated filling pressures (RA 13, W 22). Discussed with Nephrology, will give 160 mg IVP Lasix and plan to start maintenance diuretics with torsemide 20 mg QD possibly tomorrow

## 2024-01-22 NOTE — PT/OT/SLP PROGRESS
Physical Therapy      Patient Name:  Radha Abbott   MRN:  [id number]    Patient not seen today secondary to Off the floor for procedure/surgery (On attempt this AM pt CIPRIANO for RHC). Will follow-up as appropriate.  Shannon Wilcox, PT

## 2024-01-22 NOTE — PT/OT/SLP PROGRESS
Speech Language Pathology      Radha Abbott  MRN: [id number]    Patient was scheduled for repeat MBS this date. Pt off the floor for right heart cath. Swallow study rescheduled for tomorrow. In the interim pt to continue with previously recommended diet:                 General Recommendations:  Dysphagia therapy and Modified barium swallow study  Diet recommendations:  Minced & Moist Diet - IDDSI Level 5, Liquid Diet Level: Moderately thick liquids - IDDSI Level 3-   all liquids should be thickened to a honey thick consistency- pt is a silent aspirator per most recent MBSS   Ice chips only with dysphagia exercises   Encourage double swallows between bites and sips       Speech will re-attempt swallow treatment session this PM as schedule allows.     Manjula Epperson MS, CCC-SLP  Speech Language Pathologist  Pager: [phone]  Date 1/22/2024

## 2024-01-22 NOTE — ASSESSMENT & PLAN NOTE
- suspect acute tubular injury secondary to hypotension/cardiogenic shock likely compounded by intra-arterial contrast with urinary sediment with granular casts    Kidney function stable  -No acute indication for RRT   -Cystatin C - eGFR 13 which is likely improved from this original draw date.  -strict I/O's  -Continue to monitor for signs if renal recovery   -renal diet.   -for maintenance, recommend starting on Torsemide 20 mg, can also use metolazone to aid in volume optimization to achieve negative fluid goals.  -renally all dose medications to eGFR  -avoid nephrotoxic agents wean feasible (i.e. NSAIDs, intra-arterial contrast, supra-therapeutic vancomycin levels, etc.)

## 2024-01-22 NOTE — PT/OT/SLP PROGRESS
"Occupational Therapy   Treatment    Name: Radha Abbott  MRN: [id number]  Admitting Diagnosis:  LVAD (left ventricular assist device) present  Day of Surgery    Recommendations:     Discharge Recommendations: High Intensity Therapy  Discharge Equipment Recommendations:  walker, rolling, wheelchair  Barriers to discharge:  None    Assessment:     Radha Abbott is a 63 y.o. male with a medical diagnosis of LVAD (left ventricular assist device) present.  He presents with the following performance deficits affecting function are weakness, impaired endurance, impaired self care skills, impaired functional mobility, gait instability, impaired balance, decreased coordination, decreased upper extremity function, decreased lower extremity function, decreased safety awareness, impaired cardiopulmonary response to activity. Pt met sitting upright on bedside chair with wife present at bedside. Pt states he ambulated in hallway prior to OT session and c/o of fatigue. However, he was agreeable to participate. Session focused on standing balance/endurance and muscular strength. Pt would continue to benefit from skilled OT services to maximize functional independence with ADLs and functional mobility, reduce caregiver burden, and facilitate safe discharge in the least restrictive environment. Patient has demonstrated sufficient progression to warrant high intensity therapy evidenced by objectives noted below.     Rehab Prognosis:  Good; patient would benefit from acute skilled OT services to address these deficits and reach maximum level of function.       Plan:     Patient to be seen 4 x/week to address the above listed problems via self-care/home management, therapeutic activities, therapeutic exercises, neuromuscular re-education  Plan of Care Expires: 02/03/24  Plan of Care Reviewed with: patient, spouse    Subjective   "I just finished walking"  Chief Complaint: fatigue  Patient/Family Comments/goals: to rest  Pain/Comfort:  Pain " Rating 1: 0/10  Pain Rating Post-Intervention 1: 0/10    Objective:     Communicated with: RN prior to session.  Patient found up in chair with LVAD, telemetry upon OT entry to room.    General Precautions: Standard, fall, LVAD, contact    Orthopedic Precautions:N/A  Braces: N/A  Respiratory Status: Room air     Occupational Performance:     Bed Mobility:    Not assessed- pt sitting in chair and returned to chair end of session    Functional Mobility/Transfers:  Patient completed Sit <> Stand Transfer with contact guard assistance and minimum assistance  with  rolling walker   Functional Mobility: Pt performed sit<>stand transfer from bedside chair 1 x 5 reps with Min progressing to CGA and RW. CGA for functional balance as pt tolerated static standing for 5-10 seconds in between each rep    Activities of Daily Living:  Not assessed- pt reports completion of all ADLs prior to OT session      [facility] 6 Click ADL: 20    Treatment & Education:  -Education on VAD management including LVAD sternal precautions; proper anchor placement and driveline securement for DLES integrity; purpose of Self Test; identifying parts including controller, driveline, power cords, controller pouch, batteries, clips, consolidation bag, battery charger; pairing and rotating batteries; checking battery power; contents of emergency bag and need for having emergency bag when out of room/out of house; retrieving VAD numbers from controller; recording numbers in binder; transitioning to/from battery and wall power   -Education on energy conservation and task modification to maximize safety and (I) during ADLs and mobility  -Education on importance of OOB activity to improve overall activity tolerance and promote recovery  -Pt educated to call for assistance and to transfer with hospital staff only  -Provided education regarding role of OT, POC, & discharge recommendations with pt and spouse verbalizing understanding.  Pt had no further questions &  when asked whether there were any concerns pt reported none.     Patient left up in chair with all lines intact, call button in reach, RN notified, and spouse and LVAD coordinator present    GOALS:   Multidisciplinary Problems       Occupational Therapy Goals          Problem: Occupational Therapy    Goal Priority Disciplines Outcome Interventions   Occupational Therapy Goal     OT, PT/OT Ongoing, Progressing    Description: Goals to be met by: 2/3/24    Patient will increase functional independence with ADLs by performing:    UB Dressing with SBA  LE Dressing with Supervision.  Grooming while standing at sink with Supervision.  Toileting from bedside commode with SBA for hygiene and clothing management.   Step transfer to bedside commode with SBA  [place] with VAD management during ADLs                             Time Tracking:     OT Date of Treatment: 01/22/24  OT Start Time: 1453  OT Stop Time: 1501  OT Total Time (min): 8 min    Billable Minutes:Therapeutic Activity 8    OT/PRIETO: OT     Number of PRIETO visits since last OT visit: 0    1/22/2024

## 2024-01-23 ENCOUNTER — DOCUMENTATION ONLY (OUTPATIENT)
Dept: CARDIOTHORACIC SURGERY | Facility: CLINIC | Age: 64
End: 2024-01-23
Payer: MEDICAID

## 2024-01-23 LAB
ABO + RH BLD: NORMAL
ALBUMIN SERPL BCP-MCNC: 2 G/DL (ref 3.5–5.2)
ALP SERPL-CCNC: 107 U/L (ref 55–135)
ALT SERPL W/O P-5'-P-CCNC: 18 U/L (ref 10–44)
ANION GAP SERPL CALC-SCNC: 9 MMOL/L (ref 8–16)
AST SERPL-CCNC: 16 U/L (ref 10–40)
BASOPHILS # BLD AUTO: 0.03 K/UL (ref 0–0.2)
BASOPHILS NFR BLD: 0.6 % (ref 0–1.9)
BILIRUB DIRECT SERPL-MCNC: 0.1 MG/DL (ref 0.1–0.3)
BILIRUB SERPL-MCNC: 0.3 MG/DL (ref 0.1–1)
BLD GP AB SCN CELLS X3 SERPL QL: NORMAL
BLD PROD TYP BPU: NORMAL
BLOOD UNIT EXPIRATION DATE: NORMAL
BLOOD UNIT TYPE CODE: 6200
BLOOD UNIT TYPE: NORMAL
BSA FOR ECHO PROCEDURE: 1.95 M2
BUN SERPL-MCNC: 60 MG/DL (ref 8–23)
CA-I BLDV-SCNC: 1.16 MMOL/L (ref 1.06–1.42)
CALCIUM SERPL-MCNC: 8.3 MG/DL (ref 8.7–10.5)
CHLORIDE SERPL-SCNC: 111 MMOL/L (ref 95–110)
CO2 SERPL-SCNC: 18 MMOL/L (ref 23–29)
CODING SYSTEM: NORMAL
CREAT SERPL-MCNC: 3.3 MG/DL (ref 0.5–1.4)
CROSSMATCH INTERPRETATION: NORMAL
CV ECHO LV RWT: 0.26 CM
DIFFERENTIAL METHOD BLD: ABNORMAL
DISPENSE STATUS: NORMAL
DOP CALC LVOT AREA: 3.8 CM2
DOP CALC LVOT DIAMETER: 2.2 CM
DOP CALC MV VTI: 20.53 CM
DOP CALC RVOT PEAK VEL: 0.52 M/S
DOP CALC RVOT VTI: 9.04 CM
E WAVE DECELERATION TIME: 173.19 MSEC
E/A RATIO: 1.03
E/E' RATIO: 17.45 M/S
ECHO LV POSTERIOR WALL: 0.9 CM (ref 0.6–1.1)
EOSINOPHIL # BLD AUTO: 0.3 K/UL (ref 0–0.5)
EOSINOPHIL NFR BLD: 5 % (ref 0–8)
ERYTHROCYTE [DISTWIDTH] IN BLOOD BY AUTOMATED COUNT: 17.4 % (ref 11.5–14.5)
EST. GFR  (NO RACE VARIABLE): 20.4 ML/MIN/1.73 M^2
FRACTIONAL SHORTENING: 3 % (ref 28–44)
GLUCOSE SERPL-MCNC: 74 MG/DL (ref 70–110)
HCT VFR BLD AUTO: 22.9 % (ref 40–54)
HGB BLD-MCNC: 6.7 G/DL (ref 14–18)
HR MV ECHO: 9 BPM
IMM GRANULOCYTES # BLD AUTO: 0.03 K/UL (ref 0–0.04)
IMM GRANULOCYTES NFR BLD AUTO: 0.6 % (ref 0–0.5)
INR PPP: 2.2 (ref 0.8–1.2)
INTERVENTRICULAR SEPTUM: 0.76 CM (ref 0.6–1.1)
LA MAJOR: 6.52 CM
LA MINOR: 6.54 CM
LA WIDTH: 5.4 CM
LDH SERPL L TO P-CCNC: 292 U/L (ref 110–260)
LEFT ATRIUM SIZE: 4.94 CM
LEFT ATRIUM VOLUME INDEX MOD: 74.3 ML/M2
LEFT ATRIUM VOLUME INDEX: 75.5 ML/M2
LEFT ATRIUM VOLUME MOD: 145.59 CM3
LEFT ATRIUM VOLUME: 148.06 CM3
LEFT INTERNAL DIMENSION IN SYSTOLE: 6.8 CM (ref 2.1–4)
LEFT VENTRICLE DIASTOLIC VOLUME INDEX: 125.9 ML/M2
LEFT VENTRICLE DIASTOLIC VOLUME: 246.76 ML
LEFT VENTRICLE MASS INDEX: 130 G/M2
LEFT VENTRICLE SYSTOLIC VOLUME INDEX: 116.1 ML/M2
LEFT VENTRICLE SYSTOLIC VOLUME: 227.51 ML
LEFT VENTRICULAR INTERNAL DIMENSION IN DIASTOLE: 7 CM (ref 3.5–6)
LEFT VENTRICULAR MASS: 255.58 G
LV LATERAL E/E' RATIO: 12 M/S
LV SEPTAL E/E' RATIO: 32 M/S
LVAD BASE RATE: 5000 RPM
LYMPHOCYTES # BLD AUTO: 1 K/UL (ref 1–4.8)
LYMPHOCYTES NFR BLD: 20.4 % (ref 18–48)
MAGNESIUM SERPL-MCNC: 1.5 MG/DL (ref 1.6–2.6)
MCH RBC QN AUTO: 27.8 PG (ref 27–31)
MCHC RBC AUTO-ENTMCNC: 29.3 G/DL (ref 32–36)
MCV RBC AUTO: 95 FL (ref 82–98)
MONOCYTES # BLD AUTO: 0.5 K/UL (ref 0.3–1)
MONOCYTES NFR BLD: 9.4 % (ref 4–15)
MV MEAN GRADIENT: 3 MMHG
MV PEAK A VEL: 0.93 M/S
MV PEAK E VEL: 0.96 M/S
MV PEAK GRADIENT: 5 MMHG
MV STENOSIS PRESSURE HALF TIME: 50.23 MS
MV VALVE AREA P 1/2 METHOD: 4.38 CM2
NEUTROPHILS # BLD AUTO: 3.2 K/UL (ref 1.8–7.7)
NEUTROPHILS NFR BLD: 64 % (ref 38–73)
NRBC BLD-RTO: 0 /100 WBC
NUM UNITS TRANS PACKED RBC: NORMAL
PHOSPHATE SERPL-MCNC: 3.6 MG/DL (ref 2.7–4.5)
PLATELET # BLD AUTO: 358 K/UL (ref 150–450)
PMV BLD AUTO: 8.7 FL (ref 9.2–12.9)
POTASSIUM SERPL-SCNC: 4.7 MMOL/L (ref 3.5–5.1)
PROT SERPL-MCNC: 6.6 G/DL (ref 6–8.4)
PROTHROMBIN TIME: 22.8 SEC (ref 9–12.5)
PV MEAN GRADIENT: 1 MMHG
RA MAJOR: 5.12 CM
RA PRESSURE ESTIMATED: 15 MMHG
RA WIDTH: 4.32 CM
RBC # BLD AUTO: 2.41 M/UL (ref 4.6–6.2)
RIGHT VENTRICULAR END-DIASTOLIC DIMENSION: 4.49 CM
SINUS: 3.48 CM
SODIUM SERPL-SCNC: 138 MMOL/L (ref 136–145)
SPECIMEN OUTDATE: NORMAL
STJ: 2.48 CM
TDI LATERAL: 0.08 M/S
TDI SEPTAL: 0.03 M/S
TDI: 0.06 M/S
TRICUSPID ANNULAR PLANE SYSTOLIC EXCURSION: 1.18 CM
WBC # BLD AUTO: 5 K/UL (ref 3.9–12.7)
Z-SCORE OF LEFT VENTRICULAR DIMENSION IN END DIASTOLE: 2.21
Z-SCORE OF LEFT VENTRICULAR DIMENSION IN END SYSTOLE: 5.37

## 2024-01-23 PROCEDURE — 84100 ASSAY OF PHOSPHORUS: CPT | Performed by: INTERNAL MEDICINE

## 2024-01-23 PROCEDURE — 97535 SELF CARE MNGMENT TRAINING: CPT

## 2024-01-23 PROCEDURE — 25000003 PHARM REV CODE 250: Performed by: PHYSICIAN ASSISTANT

## 2024-01-23 PROCEDURE — 25000003 PHARM REV CODE 250: Performed by: INTERNAL MEDICINE

## 2024-01-23 PROCEDURE — 85610 PROTHROMBIN TIME: CPT | Performed by: PHYSICIAN ASSISTANT

## 2024-01-23 PROCEDURE — 86850 RBC ANTIBODY SCREEN: CPT | Performed by: NURSE PRACTITIONER

## 2024-01-23 PROCEDURE — 25000003 PHARM REV CODE 250: Performed by: STUDENT IN AN ORGANIZED HEALTH CARE EDUCATION/TRAINING PROGRAM

## 2024-01-23 PROCEDURE — 80048 BASIC METABOLIC PNL TOTAL CA: CPT | Performed by: INTERNAL MEDICINE

## 2024-01-23 PROCEDURE — P9016 RBC LEUKOCYTES REDUCED: HCPCS | Performed by: NURSE PRACTITIONER

## 2024-01-23 PROCEDURE — 99233 SBSQ HOSP IP/OBS HIGH 50: CPT | Mod: ,,, | Performed by: NURSE PRACTITIONER

## 2024-01-23 PROCEDURE — 92526 ORAL FUNCTION THERAPY: CPT

## 2024-01-23 PROCEDURE — A9698 NON-RAD CONTRAST MATERIALNOC: HCPCS | Performed by: INTERNAL MEDICINE

## 2024-01-23 PROCEDURE — 92611 MOTION FLUOROSCOPY/SWALLOW: CPT

## 2024-01-23 PROCEDURE — 80076 HEPATIC FUNCTION PANEL: CPT | Performed by: INTERNAL MEDICINE

## 2024-01-23 PROCEDURE — 25500020 PHARM REV CODE 255: Performed by: INTERNAL MEDICINE

## 2024-01-23 PROCEDURE — 85025 COMPLETE CBC W/AUTO DIFF WBC: CPT | Performed by: PHYSICIAN ASSISTANT

## 2024-01-23 PROCEDURE — 36415 COLL VENOUS BLD VENIPUNCTURE: CPT | Performed by: NURSE PRACTITIONER

## 2024-01-23 PROCEDURE — 20600001 HC STEP DOWN PRIVATE ROOM

## 2024-01-23 PROCEDURE — 63600175 PHARM REV CODE 636 W HCPCS: Performed by: STUDENT IN AN ORGANIZED HEALTH CARE EDUCATION/TRAINING PROGRAM

## 2024-01-23 PROCEDURE — 97530 THERAPEUTIC ACTIVITIES: CPT

## 2024-01-23 PROCEDURE — 82330 ASSAY OF CALCIUM: CPT | Performed by: INTERNAL MEDICINE

## 2024-01-23 PROCEDURE — 86920 COMPATIBILITY TEST SPIN: CPT | Performed by: NURSE PRACTITIONER

## 2024-01-23 PROCEDURE — 27000248 HC VAD-ADDITIONAL DAY

## 2024-01-23 PROCEDURE — 36430 TRANSFUSION BLD/BLD COMPNT: CPT

## 2024-01-23 PROCEDURE — 27000207 HC ISOLATION

## 2024-01-23 PROCEDURE — 63600175 PHARM REV CODE 636 W HCPCS: Performed by: NURSE PRACTITIONER

## 2024-01-23 PROCEDURE — 83615 LACTATE (LD) (LDH) ENZYME: CPT | Performed by: STUDENT IN AN ORGANIZED HEALTH CARE EDUCATION/TRAINING PROGRAM

## 2024-01-23 PROCEDURE — 25000003 PHARM REV CODE 250: Performed by: NURSE PRACTITIONER

## 2024-01-23 PROCEDURE — 83735 ASSAY OF MAGNESIUM: CPT | Performed by: INTERNAL MEDICINE

## 2024-01-23 RX ORDER — HYDRALAZINE HYDROCHLORIDE 25 MG/1
25 TABLET, FILM COATED ORAL EVERY 8 HOURS
Status: DISCONTINUED | OUTPATIENT
Start: 2024-01-23 | End: 2024-01-24

## 2024-01-23 RX ORDER — LANOLIN ALCOHOL/MO/W.PET/CERES
400 CREAM (GRAM) TOPICAL ONCE
Status: COMPLETED | OUTPATIENT
Start: 2024-01-24 | End: 2024-01-23

## 2024-01-23 RX ORDER — FUROSEMIDE 10 MG/ML
80 INJECTION INTRAMUSCULAR; INTRAVENOUS ONCE
Status: COMPLETED | OUTPATIENT
Start: 2024-01-23 | End: 2024-01-23

## 2024-01-23 RX ORDER — HYDRALAZINE HYDROCHLORIDE 10 MG/1
10 TABLET, FILM COATED ORAL EVERY 8 HOURS
Status: DISCONTINUED | OUTPATIENT
Start: 2024-01-23 | End: 2024-01-23

## 2024-01-23 RX ORDER — HYDROCODONE BITARTRATE AND ACETAMINOPHEN 500; 5 MG/1; MG/1
TABLET ORAL
Status: DISCONTINUED | OUTPATIENT
Start: 2024-01-23 | End: 2024-01-24 | Stop reason: HOSPADM

## 2024-01-23 RX ORDER — TORSEMIDE 20 MG/1
40 TABLET ORAL DAILY
Status: DISCONTINUED | OUTPATIENT
Start: 2024-01-23 | End: 2024-01-24

## 2024-01-23 RX ADMIN — TRAZODONE HYDROCHLORIDE 25 MG: 50 TABLET ORAL at 08:01

## 2024-01-23 RX ADMIN — BARIUM SULFATE 5 ML: 400 SUSPENSION ORAL at 10:01

## 2024-01-23 RX ADMIN — HYDRALAZINE HYDROCHLORIDE 10 MG: 20 INJECTION, SOLUTION INTRAMUSCULAR; INTRAVENOUS at 04:01

## 2024-01-23 RX ADMIN — OXYMETAZOLINE HYDROCHLORIDE 2 SPRAY: 0.05 SPRAY NASAL at 08:01

## 2024-01-23 RX ADMIN — GABAPENTIN 100 MG: 100 CAPSULE ORAL at 08:01

## 2024-01-23 RX ADMIN — VENLAFAXINE 37.5 MG: 37.5 TABLET ORAL at 08:01

## 2024-01-23 RX ADMIN — FLUTICASONE PROPIONATE 100 MCG: 50 SPRAY, METERED NASAL at 08:01

## 2024-01-23 RX ADMIN — Medication 400 MG: at 11:01

## 2024-01-23 RX ADMIN — HYDRALAZINE HYDROCHLORIDE 25 MG: 25 TABLET ORAL at 09:01

## 2024-01-23 RX ADMIN — AMIODARONE HYDROCHLORIDE 400 MG: 200 TABLET ORAL at 08:01

## 2024-01-23 RX ADMIN — ACETAMINOPHEN 1000 MG: 500 TABLET ORAL at 08:01

## 2024-01-23 RX ADMIN — CHOLECALCIFEROL TAB 25 MCG (1000 UNIT) 1000 UNITS: 25 TAB at 08:01

## 2024-01-23 RX ADMIN — ACETAMINOPHEN 1000 MG: 500 TABLET ORAL at 04:01

## 2024-01-23 RX ADMIN — SENNOSIDES AND DOCUSATE SODIUM 2 TABLET: 8.6; 5 TABLET ORAL at 08:01

## 2024-01-23 RX ADMIN — FAMOTIDINE 20 MG: 20 TABLET ORAL at 08:01

## 2024-01-23 RX ADMIN — Medication: at 08:01

## 2024-01-23 RX ADMIN — HYDRALAZINE HYDROCHLORIDE 10 MG: 20 INJECTION, SOLUTION INTRAMUSCULAR; INTRAVENOUS at 11:01

## 2024-01-23 RX ADMIN — BARIUM SULFATE 30 ML: 0.81 POWDER, FOR SUSPENSION ORAL at 10:01

## 2024-01-23 RX ADMIN — WARFARIN SODIUM 2.5 MG: 2.5 TABLET ORAL at 04:01

## 2024-01-23 RX ADMIN — TORSEMIDE 40 MG: 20 TABLET ORAL at 08:01

## 2024-01-23 RX ADMIN — HYDRALAZINE HYDROCHLORIDE 25 MG: 25 TABLET ORAL at 04:01

## 2024-01-23 RX ADMIN — FUROSEMIDE 80 MG: 10 INJECTION, SOLUTION INTRAVENOUS at 04:01

## 2024-01-23 NOTE — PLAN OF CARE
Problem: Adult Inpatient Plan of Care  Goal: Plan of Care Review  Outcome: Ongoing, Progressing     Problem: Adult Inpatient Plan of Care  Goal: Absence of Hospital-Acquired Illness or Injury  Outcome: Ongoing, Progressing     Problem: Adult Inpatient Plan of Care  Goal: Readiness for Transition of Care  Outcome: Ongoing, Progressing     Problem: Fluid and Electrolyte Imbalance (Acute Kidney Injury/Impairment)  Goal: Fluid and Electrolyte Balance  Outcome: Ongoing, Progressing     Problem: Oral Intake Inadequate (Acute Kidney Injury/Impairment)  Goal: Optimal Nutrition Intake  Outcome: Ongoing, Progressing     Problem: Infection  Goal: Absence of Infection Signs and Symptoms  Outcome: Ongoing, Progressing     Problem: Cardiac Output Decreased (Heart Failure)  Goal: Optimal Cardiac Output  Outcome: Ongoing, Progressing     Problem: Fluid Imbalance (Heart Failure)  Goal: Fluid Balance  Outcome: Ongoing, Progressing     Problem: Cardiac Output Decreased  Goal: Effective Cardiac Output  Outcome: Ongoing, Progressing     Problem: Skin Injury Risk Increased  Goal: Skin Health and Integrity  Outcome: Ongoing, Progressing     Problem: Fall Injury Risk  Goal: Absence of Fall and Fall-Related Injury  Outcome: Ongoing, Progressing     Problem: Device-Related Complication Risk (Hemodialysis)  Goal: Safe, Effective Therapy Delivery  Outcome: Ongoing, Progressing  \\\\\\\\\\\\\\\\\\

## 2024-01-23 NOTE — PLAN OF CARE
Patient cooperative and pleasant with routine care and procedures.  Spouse at bedside and participates in patient care and ADLs.  Fall precautions reviewed and patient verbalized understanding.  Patient ambulating with walker with assistance x1 person.  Anticipating discharge to rehab this week before discharge to home.  Resting quietly and without complaints.  Will continue to monitor.

## 2024-01-23 NOTE — PLAN OF CARE
Problem: Adult Inpatient Plan of Care  Goal: Plan of Care Review  Outcome: Ongoing, Progressing     Problem: Adult Inpatient Plan of Care  Goal: Absence of Hospital-Acquired Illness or Injury  Outcome: Ongoing, Progressing     Problem: Infection  Goal: Absence of Infection Signs and Symptoms  Outcome: Ongoing, Progressing     Problem: Fluid Imbalance (Heart Failure)  Goal: Fluid Balance  Outcome: Ongoing, Progressing     Problem: Cardiac Output Decreased  Goal: Effective Cardiac Output  Outcome: Ongoing, Progressing     Problem: Fall Injury Risk  Goal: Absence of Fall and Fall-Related Injury  Outcome: Ongoing, Progressing     Problem: Infection (Ventricular Assist Device)  Goal: Absence of Infection Signs and Symptoms  Outcome: Ongoing, Progressing     Problem: Impaired Wound Healing  Goal: Optimal Wound Healing  Outcome: Ongoing, Progressing

## 2024-01-23 NOTE — PT/OT/SLP PROGRESS
Physical Therapy      Patient Name:  Radha Abbott   MRN:  [id number]    Patient not seen today secondary to x3 attempts made to see pt today. AM attempt pt with OT, later AM pt CIPRIANO for MBSS, PM attempt pt found sleeping with spouse reporting pt about to receive blood. Will follow-up tomorrow.

## 2024-01-23 NOTE — PLAN OF CARE
Problem: SLP  Goal: SLP Goal  Description: Speech Language Pathology Goals  Goals expected to be met by 12/24    1. Pt will participate in ongoing assessment of swallow function to help determine least restrictive diet   MBS completed. Recommend upgrade to regular solids and honey thick liquids. Medications whole vs. Crushed. Full report to come.    Outcome: Ongoing, Progressing

## 2024-01-23 NOTE — SUBJECTIVE & OBJECTIVE
Interval History: NAEON. No complaints.     Continuous Infusions:   sodium chloride 0.9%       Scheduled Meds:   acetaminophen  1,000 mg Per NG tube TID    amiodarone  400 mg Oral Daily    balsam peru-castor oiL   Topical (Top) BID    famotidine  20 mg Oral Daily    fluticasone propionate  2 spray Each Nostril Daily    gabapentin  100 mg Oral BID    oxymetazoline  2 spray Each Nostril BID    polyethylene glycol  17 g Oral Daily    psyllium husk (aspartame)  1 packet Oral Daily    senna-docusate 8.6-50 mg  2 tablet Oral Daily    traZODone  25 mg Oral QHS    venlafaxine  37.5 mg Oral Daily    vitamin D  1,000 Units Oral Daily    warfarin  2.5 mg Oral Every Tues, Thurs, Sat, Sun    warfarin  5 mg Oral Every Mon, Wed, Fri     PRN Meds:0.9%  NaCl infusion (for blood administration), bisacodyL, dextrose 10%, dextrose 10%, dextrose 10%, dextrose 10%, glucagon (human recombinant), glucose, glucose, heparin (porcine), hydrALAZINE, insulin aspart U-100, LIDOcaine HCL 20 mg/ml (2%), LIDOcaine-EPINEPHrine 1%-1:100,000, ondansetron, Flushing PICC/Midline Protocol **AND** [DISCONTINUED] sodium chloride 0.9% **AND** sodium chloride 0.9%, traMADoL, zolpidem    Review of patient's allergies indicates:  No Known Allergies  Objective:     Vital Signs (Most Recent):  Temp: 97.7 °F (36.5 °C) (01/23/24 0441)  Pulse: 88 (01/23/24 0719)  Resp: 16 (01/23/24 0441)  BP: (!) 88/0 (01/23/24 0719)  SpO2: 98 % (01/23/24 0441) Vital Signs (24h Range):  Temp:  [97.7 °F (36.5 °C)-98.4 °F (36.9 °C)] 97.7 °F (36.5 °C)  Pulse:  [52-92] 88  Resp:  [16-18] 16  SpO2:  [97 %-99 %] 98 %  BP: (72-96)/(0-64) 88/0     Patient Vitals for the past 72 hrs (Last 3 readings):   Weight   01/23/24 0719 74.8 kg (165 lb)   01/22/24 1407 75 kg (165 lb 5.5 oz)   01/21/24 0900 73.6 kg (162 lb 4.1 oz)       Body mass index is 22.38 kg/m².      Intake/Output Summary (Last 24 hours) at 1/23/2024 0828  Last data filed at 1/23/2024 0600  Gross per 24 hour   Intake 580 ml    Output 300 ml   Net 280 ml         Hemodynamic Parameters:       Telemetry: SR       Physical Exam  Constitutional:       Comments: Cachectic, temporal wasting   HENT:      Head: Normocephalic and atraumatic.   Eyes:      Conjunctiva/sclera: Conjunctivae normal.      Pupils: Pupils are equal, round, and reactive to light.   Neck:      Comments: Do not appreciate elevated JVP  Cardiovascular:      Rate and Rhythm: Normal rate and regular rhythm.      Comments: Smooth VAD hum  Pulmonary:      Effort: Pulmonary effort is normal.      Breath sounds: Normal breath sounds.   Abdominal:      General: Bowel sounds are normal.      Palpations: Abdomen is soft.   Musculoskeletal:         General: No swelling. Normal range of motion.      Cervical back: Normal range of motion and neck supple.   Skin:     General: Skin is warm and dry.      Capillary Refill: Capillary refill takes 2 to 3 seconds.   Neurological:      General: No focal deficit present.      Mental Status: He is alert and oriented to person, place, and time.            Significant Labs:  CBC:  Recent Labs   Lab 01/21/24  0557 01/22/24  0502 01/23/24  0506   WBC 7.50 6.08 5.00   RBC 2.89* 2.22* 2.41*   HGB 8.0* 6.2* 6.7*   HCT 28.1* 20.8* 22.9*    477* 358   MCV 97 94 95   MCH 27.7 27.9 27.8   MCHC 28.5* 29.8* 29.3*       BNP:  Recent Labs   Lab 01/17/24  0541 01/19/24  0614 01/22/24  0502   * 1,278* 1,242*       CMP:  Recent Labs   Lab 01/21/24  0557 01/22/24  0502 01/23/24  0506   * 81 74   CALCIUM 8.9 8.5* 8.3*   ALBUMIN 2.3* 2.1* 2.0*   PROT 7.7 6.7 6.6   * 137 138   K 5.0 5.0 4.7   CO2 20* 19* 18*    110 111*   BUN 59* 62* 60*   CREATININE 3.4* 3.3* 3.3*   ALKPHOS 133 115 107   ALT 26 21 18   AST 21 17 16   BILITOT 0.3 0.2 0.3        Coagulation:   Recent Labs   Lab 01/21/24  0557 01/22/24  0502 01/23/24  0506   INR 3.0* 2.5* 2.2*       LDH:  Recent Labs   Lab 01/21/24  0557 01/22/24  0502 01/23/24  0506   * 318* 292*        Microbiology:  Microbiology Results (last 7 days)       Procedure Component Value Units Date/Time    AFB Culture & Smear [[id number]] Collected: 12/06/23 1532    Order Status: Completed Specimen: Body Fluid from Lung, Right Updated: 01/18/24 0928     AFB Culture & Smear Culture in progress      Culture in progress     AFB CULTURE STAIN No acid fast bacilli seen.    Narrative:      Bronchial Wash            I have reviewed all pertinent labs within the past 24 hours.    Estimated Creatinine Clearance: 24.9 mL/min (A) (based on SCr of 3.3 mg/dL (H)).    Diagnostic Results:  I have reviewed and interpreted all pertinent imaging results/findings within the past 24 hours.

## 2024-01-23 NOTE — NURSING
"Rounded on patient at bedside. Patient is AAOx4, sitting up in chair w/ spouse at bedside. Patient reports feeling well. DLES assessed, suture removed. DLES "1", not fully incorporated. DLES dressing change to completed by spouse this afternoon.   "

## 2024-01-23 NOTE — ASSESSMENT & PLAN NOTE
-HeartMate 3 Implanted  12/1/2023  as DT  -HTS Primary  -Implanted by Dr. Washington  -Cont Coumadin, dosing by PharmD.  Goal INR 2.0-3.0 . Therapeutic today  -Antiplatelets Not on ASA  -LDH is stable overall today. Will continue to monitor daily.  -Weaned off midodrine.  -Speed set at  4900   rpm, LSL 4500 rpm   -Interrogation notable for no events  -ECHO 1/2/24 mild TR, LVEDD 6.25 with HM3 set to 4900 rpm   -RHC 1/22/24 with RA 13, PCWP 22, PA 50/25, CO 5.8, CI 3.0   -Not listed for OHTx, declined for OHTX due to comorbidities   -PT/OT/Speech. Recommending rehab but HD + LVAD may be barrier      Procedure: Device Interrogation Including analysis of device parameters  Current Settings: Ventricular Assist Device  Review of device function is stable/unstable stable        1/23/2024     7:20 AM 1/23/2024     4:47 AM 1/22/2024    11:23 PM 1/22/2024     7:18 PM 1/22/2024     3:55 PM 1/22/2024    11:57 AM 1/22/2024     8:08 AM   TXP LVAD INTERROGATIONS   Type HeartMate3 HeartMate3 HeartMate3 HeartMate3 HeartMate3 HeartMate3 HeartMate3   Flow 3.7 3.6 3.5 3.7 3.9 3.7 3.2   Speed 5000 5000 5000 5000 5000 4900 5000   PI 4.9 5 6.2 5.3 3.9 4.8 7.6   Power (Carrington) 3.4 3.4 3.3 3.4 3.4 3.3 3.3   LSL 4600    4600 4500 4600   Pulsatility Intermittent pulse Intermittent pulse Intermittent pulse Intermittent pulse Intermittent pulse No Pulse Intermittent pulse

## 2024-01-23 NOTE — PROGRESS NOTES
01/23/2024  [place] Duarte    Current provider:  Sajan Hurley, *    Device interrogation:      1/23/2024     7:20 AM 1/23/2024     4:47 AM 1/22/2024    11:23 PM 1/22/2024     7:18 PM 1/22/2024     3:55 PM 1/22/2024    11:57 AM 1/22/2024     8:08 AM   TXP LVAD INTERROGATIONS   Type HeartMate3 HeartMate3 HeartMate3 HeartMate3 HeartMate3 HeartMate3 HeartMate3   Flow 3.7 3.6 3.5 3.7 3.9 3.7 3.2   Speed 5000 5000 5000 5000 5000 4900 5000   PI 4.9 5 6.2 5.3 3.9 4.8 7.6   Power (Carrington) 3.4 3.4 3.3 3.4 3.4 3.3 3.3   LSL 4600    4600 4500 4600   Pulsatility Intermittent pulse Intermittent pulse Intermittent pulse Intermittent pulse Intermittent pulse No Pulse Intermittent pulse          Rounded on [facility] to ensure all mechanical assist device settings (IABP or VAD) were appropriate and all parameters were within limits.  I was able to ensure all back up equipment was present, the staff had no issues, and the Perfusion Department daily rounding was complete.      For implantable VADs: Interrogation of Ventricular assist device was performed with analysis of device parameters and review of device function. I have personally reviewed the interrogation findings and agree with findings as stated.     In emergency, the nursing units have been notified to contact the perfusion department either by:  Calling [phone] from 630am to 4pm Mon thru Fri, utilizing the On-Call Finder functionality of Epic and searching for Perfusion, or by contacting the hospital  from 4pm to 630am and on weekends and asking to speak with the perfusionist on call.    11:25 AM

## 2024-01-23 NOTE — ASSESSMENT & PLAN NOTE
-RD and SLP following  -Minced and moist diet. all liquids should be thickened to a honey thick consistency- pt is a silent aspirator per most recent MBSS   -Boost GC w/ thickener for optimization of protein and calorie intake   -Plan for repeat MBSS today.

## 2024-01-23 NOTE — NURSING
1615: brachial doppler 102/0. DENIZ Park was notified. PRN hydralazine 10 mg IV and furosemide 80 mg IVP given. Hydralazine 25 mg PO also given per PA's orders.  Will recheck doppler right after the blood transfusion finished.

## 2024-01-23 NOTE — PROGRESS NOTES
[facility] - Cardiology Stepdown  Heart Transplant  Progress Note    Patient Name: Radha Abbott  MRN: [id number]  Admission Date: 11/9/2023  Hospital Length of Stay: 75 days  Attending Physician: Sajan Hurley, *  Primary Care Provider: Vasu Kong MD  Principal Problem:LVAD (left ventricular assist device) present    Subjective:   Interval History: NAEON. No complaints.     Continuous Infusions:   sodium chloride 0.9%       Scheduled Meds:   acetaminophen  1,000 mg Per NG tube TID    amiodarone  400 mg Oral Daily    balsam peru-castor oiL   Topical (Top) BID    famotidine  20 mg Oral Daily    fluticasone propionate  2 spray Each Nostril Daily    gabapentin  100 mg Oral BID    oxymetazoline  2 spray Each Nostril BID    polyethylene glycol  17 g Oral Daily    psyllium husk (aspartame)  1 packet Oral Daily    senna-docusate 8.6-50 mg  2 tablet Oral Daily    traZODone  25 mg Oral QHS    venlafaxine  37.5 mg Oral Daily    vitamin D  1,000 Units Oral Daily    warfarin  2.5 mg Oral Every Tues, Thurs, Sat, Sun    warfarin  5 mg Oral Every Mon, Wed, Fri     PRN Meds:0.9%  NaCl infusion (for blood administration), bisacodyL, dextrose 10%, dextrose 10%, dextrose 10%, dextrose 10%, glucagon (human recombinant), glucose, glucose, heparin (porcine), hydrALAZINE, insulin aspart U-100, LIDOcaine HCL 20 mg/ml (2%), LIDOcaine-EPINEPHrine 1%-1:100,000, ondansetron, Flushing PICC/Midline Protocol **AND** [DISCONTINUED] sodium chloride 0.9% **AND** sodium chloride 0.9%, traMADoL, zolpidem    Review of patient's allergies indicates:  No Known Allergies  Objective:     Vital Signs (Most Recent):  Temp: 97.7 °F (36.5 °C) (01/23/24 0441)  Pulse: 88 (01/23/24 0719)  Resp: 16 (01/23/24 0441)  BP: (!) 88/0 (01/23/24 0719)  SpO2: 98 % (01/23/24 0441) Vital Signs (24h Range):  Temp:  [97.7 °F (36.5 °C)-98.4 °F (36.9 °C)] 97.7 °F (36.5 °C)  Pulse:  [52-92] 88  Resp:  [16-18] 16  SpO2:  [97 %-99 %] 98 %  BP: (72-96)/(0-64) 88/0      Patient Vitals for the past 72 hrs (Last 3 readings):   Weight   01/23/24 0719 74.8 kg (165 lb)   01/22/24 1407 75 kg (165 lb 5.5 oz)   01/21/24 0900 73.6 kg (162 lb 4.1 oz)       Body mass index is 22.38 kg/m².      Intake/Output Summary (Last 24 hours) at 1/23/2024 0828  Last data filed at 1/23/2024 0600  Gross per 24 hour   Intake 580 ml   Output 300 ml   Net 280 ml         Hemodynamic Parameters:       Telemetry: SR       Physical Exam  Constitutional:       Comments: Cachectic, temporal wasting   HENT:      Head: Normocephalic and atraumatic.   Eyes:      Conjunctiva/sclera: Conjunctivae normal.      Pupils: Pupils are equal, round, and reactive to light.   Neck:      Comments: Do not appreciate elevated JVP  Cardiovascular:      Rate and Rhythm: Normal rate and regular rhythm.      Comments: Smooth VAD hum  Pulmonary:      Effort: Pulmonary effort is normal.      Breath sounds: Normal breath sounds.   Abdominal:      General: Bowel sounds are normal.      Palpations: Abdomen is soft.   Musculoskeletal:         General: No swelling. Normal range of motion.      Cervical back: Normal range of motion and neck supple.   Skin:     General: Skin is warm and dry.      Capillary Refill: Capillary refill takes 2 to 3 seconds.   Neurological:      General: No focal deficit present.      Mental Status: He is alert and oriented to person, place, and time.            Significant Labs:  CBC:  Recent Labs   Lab 01/21/24  0557 01/22/24  0502 01/23/24  0506   WBC 7.50 6.08 5.00   RBC 2.89* 2.22* 2.41*   HGB 8.0* 6.2* 6.7*   HCT 28.1* 20.8* 22.9*    477* 358   MCV 97 94 95   MCH 27.7 27.9 27.8   MCHC 28.5* 29.8* 29.3*       BNP:  Recent Labs   Lab 01/17/24  0541 01/19/24  0614 01/22/24  0502   * 1,278* 1,242*       CMP:  Recent Labs   Lab 01/21/24  0557 01/22/24  0502 01/23/24  0506   * 81 74   CALCIUM 8.9 8.5* 8.3*   ALBUMIN 2.3* 2.1* 2.0*   PROT 7.7 6.7 6.6   * 137 138   K 5.0 5.0 4.7   CO2 20*  19* 18*    110 111*   BUN 59* 62* 60*   CREATININE 3.4* 3.3* 3.3*   ALKPHOS 133 115 107   ALT 26 21 18   AST 21 17 16   BILITOT 0.3 0.2 0.3        Coagulation:   Recent Labs   Lab 01/21/24  0557 01/22/24  0502 01/23/24  0506   INR 3.0* 2.5* 2.2*       LDH:  Recent Labs   Lab 01/21/24  0557 01/22/24  0502 01/23/24  0506   * 318* 292*       Microbiology:  Microbiology Results (last 7 days)       Procedure Component Value Units Date/Time    AFB Culture & Smear [[id number]] Collected: 12/06/23 1532    Order Status: Completed Specimen: Body Fluid from Lung, Right Updated: 01/18/24 0928     AFB Culture & Smear Culture in progress      Culture in progress     AFB CULTURE STAIN No acid fast bacilli seen.    Narrative:      Bronchial Wash            I have reviewed all pertinent labs within the past 24 hours.    Estimated Creatinine Clearance: 24.9 mL/min (A) (based on SCr of 3.3 mg/dL (H)).    Diagnostic Results:  I have reviewed and interpreted all pertinent imaging results/findings within the past 24 hours.  Assessment and Plan:     63 year old male with hx of CAD s/p 3v CABG (unclear anatomy, 2009), ICM with a recent EF of 15-20% s/p ICD (medtronik 2009), DM2 (a1c 7.7), HTN, HLD, Vfib on amio who presents to the ED with CC of SOB     [place] [facility] [place].  He was started on a Lasix gtt and did well.  Started on gDMT and discharged home on  5 with plans to follow up in HTS clinic for ongoing transplant evaluation at another facility.  He says that about a few days ago he started to notice LE swelling.  This turned into Nelson and orthopnea.  He says he can't walk to the bathroom without getting SOB.  He also complains of weight gain.  He takes torsemide 20mg BID at home and was told to trial additional Lasix which he did without any improvement.  He was rx metolazone but has not been filled.  He came to the ED     In the ED he was AF with stable VS on RA.  CBC showing chronic anemia.   CMP notable for acute on chronic CKD with baseline around 2.1 and Cr now 2.6.  BNP elevated.  Lactate neg.  CXR showing pulmonary edema.  I evaluated the pt at the bedside.  Bedside TTE showing CVP >15.  He was subsequently admitted to the CCU for diuresis.     He was continued on his home  and was started on a lasix gtt, which he responded well to overnight (net -1700cc. He feels much better this morning as well. Our transplant coordinators have been working on insurance approval and he is now being transferred to [facility] service for transplant evaluation.     * LVAD (left ventricular assist device) present  -HeartMate 3 Implanted  12/1/2023  as DT  -HTS Primary  -Implanted by Dr. Washington  -Cont Coumadin, dosing by PharmD.  Goal INR 2.0-3.0 . Therapeutic today  -Antiplatelets Not on ASA  -LDH is stable overall today. Will continue to monitor daily.  -Weaned off midodrine.  -Speed set at  4900   rpm, LSL 4500 rpm   -Interrogation notable for no events  -ECHO 1/2/24 mild TR, LVEDD 6.25 with HM3 set to 4900 rpm   -RHC 1/22/24 with RA 13, PCWP 22, PA 50/25, CO 5.8, CI 3.0   -Not listed for OHTx, declined for OHTX due to comorbidities   -PT/OT/Speech. Recommending rehab but HD + LVAD may be barrier      Procedure: Device Interrogation Including analysis of device parameters  Current Settings: Ventricular Assist Device  Review of device function is stable/unstable stable        1/23/2024     7:20 AM 1/23/2024     4:47 AM 1/22/2024    11:23 PM 1/22/2024     7:18 PM 1/22/2024     3:55 PM 1/22/2024    11:57 AM 1/22/2024     8:08 AM   TXP LVAD INTERROGATIONS   Type HeartMate3 HeartMate3 HeartMate3 HeartMate3 HeartMate3 HeartMate3 HeartMate3   Flow 3.7 3.6 3.5 3.7 3.9 3.7 3.2   Speed 5000 5000 5000 5000 5000 4900 5000   PI 4.9 5 6.2 5.3 3.9 4.8 7.6   Power (Carrington) 3.4 3.4 3.3 3.4 3.4 3.3 3.3   LSL 4600    4600 4500 4600   Pulsatility Intermittent pulse Intermittent pulse Intermittent pulse Intermittent pulse Intermittent pulse  No Pulse Intermittent pulse         Moderate malnutrition  -RD and SLP following  -Minced and moist diet. all liquids should be thickened to a honey thick consistency- pt is a silent aspirator per most recent MBSS   -Boost GC w/ thickener for optimization of protein and calorie intake   -Plan for repeat MBSS today.     Acute renal failure with acute tubular necrosis superimposed on stage 3b chronic kidney disease  IVÁN on CKD2. Baseline Cr of 1.7-2.0.   - Hold ARNi  -Trend BMPs daily   -Nephrology following   -SLED/SCUF for volume removal began on 12/1. He is anuric  -Did not respond to diuretic challenge with 240 mg IV Lasix, 1000 mg Diuril, and 500 mg IV Diamox done 12/14  -Diuretic challenge 1/8 with 160 mg IVP Lasix,  -Now on HD TThSat; but last HD was on 1/10  -Tunneled trialysis line placed 12/22 by IR  -RHC 1/22 with elevated filling pressures (RA 13, W 22). Discussed with Nephrology, will give 160 mg IVP Lasix and plan to start maintenance diuretics with torsemide 20 mg QD possibly tomorrow     Right parotid mass  - 2.2cm R. Parotid mass noted.   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 Can follow up as outpatient w/ ENT for FNA. No further interventions needed as inpatient.     Lymphadenopathy  +ill contacts  Respiratory panel negative  Strep test negative.   Throat culture showing pseudomonas. As per ID , will treat w/ 5 day course of zosyn (therapy completed 1/10)    Unilateral complete vocal fold paralysis  -Appreciate ENT's help  -S/P augmentation of paralyzed left vocal cord 12/18    Dysphonia  -Speech following closely  -Appreciate ENT's help. S/P augmentation of paralyzed left vocal cord 12/18    Depressed mood  -Psychiatry consulted for depressed mood, SI when left alone  -Recommend sitter when alone (possibly with transition to stepdown).  -Psych reconsulted 1/2, agree with venlafaxine and trazadone  -Remeron added as appetite stimulant, will discontinue as eating well and monitor  response     IVÁN (acute kidney injury)  -Nephrology following      Acute on chronic combined systolic and diastolic CHF, NYHA class 4  Pt with known ICM with an EF of 25% on home  presented with decompensated HF.    -S/P LVAD    PAF (paroxysmal atrial fibrillation)  Known hx of pAF. In sinus rhythm on admission, but 1 run of AF RVR overnight. He spontaneously converted.  - Continue home amiodarone 400mg qd  - Continue AC      Type 2 diabetes mellitus without complication, without long-term current use of insulin  -A1c 7.6, not insulin dependent  - Endocrine following, appreciate assistance with blood glucose management    CAD (coronary artery disease)  -S/p 3vCABG in 2009  -Lipitor on hold 2/2 mild transaminitis  -Not on ASA post VAD    Ventricular tachycardia  Hx VT s/p ICD placement (medtronic 2009)  - Continue home amio 400mg qd        Erasto Park, NP  Heart Transplant  [facility] - Cardiology Stepdown

## 2024-01-23 NOTE — PROGRESS NOTES
Met with pt's wife at bedside (pt was asleep) and reviewed home wound care instructions upon discharge from rehab for pt's midsternal and chest tube site incisions, s/p LVAD implantation. Reviewed daily inspection and cleaning of mid-sternal and chest tube site incisions once discharged from rehab and instructed for pt or his wife to call the clinic with any questions or concerns. Pt's wife instructed to have pt remove dressings in place to mid-sternal and chest tube site incisions once discharged from rehab, if present, and to leave mid-sternal and chest tube site incisions open to air.  Pt's wife was provided with a hand-out (see below) which contains detailed instructions on daily wound care inspection and care to mid-sternal and chest tube site incisions, once discharged from rehab.  Pt's wife instructed to clean and dress pt's driveline exit site as instructed by LVAD Coordinators.  Pt's wife reminded of pt's 20 pound lifting, pushing, and pulling restrictions through February 26 and for pt to refrain from driving until released to do so.  Pt's wife instructed to have pt continue utilizing his Incentive Spirometer to expand his lungs and prevent pneumonia. Pt's wife informed that pt will receive his clinic appts from the LVAD team upon discharge from rehab, which will include his CTS post-op and CXR appts.  Pt's wife verbalized understanding to all instructions and denied having questions at this time.     Please clean and dress your driveline exit site as instructed by your LVAD Coordinator.  Please wash your mid-sternal and chest tube site incisions everyday with soap and water to cleanse the sites.    Use normal bar soap, not perfumed soap or body wash. During your recovery, do not try a new brand of soap.   Place soapy water on your hand or a clean washcloth and gently wash your incisions using an up-and-down motion.   Do not apply ointments, oils, or salves to your incisions.   Pat the skin gently to dry.       Wash your hands before and after caring for or touching your incisions.  Look in the mirror daily. Inspect your incisions for redness, drainage and warmth. Your incisions should be healing; there should NOT be an increase in opening.  Gently wash your incisions everyday with soap and warm water using a clean washcloth, or your hand and light touch.  Gently pat dry with a clean towel.  You do not need to cover your incisions unless they are draining.  If you are experiencing drainage, it is important to call your doctor.  Monday - Friday, from 8:00am - 5pm, call [phone].  Outside of these hours, please call [phone], which is a 24-hour nurse care advice line.     When to call your doctor:   Increased drainage or oozing from the incision(s)  Increased opening of the incision line(s) - there should not be gaps or pulling apart areas of the incision(s)  Redness along the incision(s) - if the incisions were red or pink when you left the hospital, they should be improving and not becoming more red  Warmth along the incision line(s)  Increased body temperature / Fever - greater than 101 degrees Fahrenheit  If you have diabetes and your blood sugar levels begin to vary

## 2024-01-23 NOTE — PLAN OF CARE
Discharge planning:    Patient diet increased to Regular food with Honey thickened liquids per MBSS/ST recs on 1/23/24    Patient and Caregiver are checked off on alarms and dressing changes    [facility] has IP authorization for transfer to rehab tomorrow pending continued medical stability    Patient transitioned to po diuretics today after receiving 160mg IV Lasix yesterday 1/22/24 per RHC #s    No HD since 1/10/24--Nephrology has been following closely     CTS provided dc education to patient/family    Will coordinate with [facility] in am regarding potential transfer to rehab on 1/24/24    Per VAD coordinator all equipment and supplies are at bedside ready for transfer

## 2024-01-23 NOTE — PT/OT/SLP PROGRESS
Occupational Therapy   Treatment    Name: Radha Mora  MRN: [id number]  Admitting Diagnosis:  LVAD (left ventricular assist device) present  1 Day Post-Op    Recommendations:     Discharge Recommendations: High Intensity Therapy  Discharge Equipment Recommendations:  walker, rolling, wheelchair  Barriers to discharge:  None    Assessment:     Radha Mora is a 63 y.o. male with a medical diagnosis of LVAD (left ventricular assist device) present.  He presents with the following performance deficits affecting function are weakness, impaired endurance, impaired self care skills, impaired functional mobility, gait instability, impaired balance, decreased coordination, decreased upper extremity function, decreased lower extremity function, decreased safety awareness, impaired cardiopulmonary response to activity. Pt was able to switch from wall to battery power and don LVAD vest with stand by assist. Pt requested to complete functional mobility in mora in order to improve standing balance/endurance in preparation for performance of daily activities of choice. 1 LOB during functional mobility requiring Min (A) to recover. Pt remains limited in ADLs, functional mobility, and functional transfers and is currently not performing tasks at OF. Pt would continue to benefit from skilled OT services to maximize functional independence with ADLs and functional mobility, reduce caregiver burden, and facilitate safe discharge in the least restrictive environment. Patient has demonstrated sufficient progression to warrant high intensity therapy evidenced by objectives noted below.     Rehab Prognosis:  Good; patient would benefit from acute skilled OT services to address these deficits and reach maximum level of function.       Plan:     Patient to be seen 4 x/week to address the above listed problems via self-care/home management, therapeutic activities, therapeutic exercises, neuromuscular re-education  Plan of Care Expires:  "02/03/24  Plan of Care Reviewed with: patient, spouse    Subjective   "This is how I walk"  Chief Complaint: none stated  Patient/Family Comments/goals: to walk in hallway  Pain/Comfort:  Pain Rating 1: 0/10  Pain Rating Post-Intervention 1: 0/10    Objective:     Communicated with: RN prior to session.  Patient found up in chair with LVAD, telemetry upon OT entry to room.    General Precautions: Standard, fall, LVAD, contact    Orthopedic Precautions:N/A  Braces: N/A  Respiratory Status: Room air     Occupational Performance:     Bed Mobility:    Not assessed- pt sitting in chair and returned to chair end of session    Functional Mobility/Transfers:  Patient completed Sit <> Stand Transfer with minimum assistance  with  rolling walker   Functional Mobility: Pt engaged in functional mobility to simulate household/community distances 80 + 80 ft with CGA-Min (A) and RW in order to maximize functional endurance and standing balance required for engagement in occupations of choice   1 LOB when turning to the left requiring Min (A) to recover  No SOB  1 seated rest break    Activities of Daily Living:  Upper Body Dressing: stand by assistance to don LVAD vest and jacket sitting up on bedside chair  Lower Body Dressing: stand by assistance to don b/l tennis shoes      [facility] 6 Click ADL: 21    Treatment & Education:  -Education on VAD management including LVAD sternal precautions; proper anchor placement and driveline securement for DLES integrity; purpose of Self Test; identifying parts including controller, driveline, power cords, controller pouch, batteries, clips, consolidation bag, battery charger; pairing and rotating batteries; checking battery power; contents of emergency bag and need for having emergency bag when out of room/out of house; retrieving VAD numbers from controller; recording numbers in binder; transitioning to/from battery and wall power   -Education on energy conservation and task modification to " maximize safety and (I) during ADLs and mobility  -Education on importance of OOB activity to improve overall activity tolerance and promote recovery  -Pt educated to call for assistance and to transfer with hospital staff only  -Provided education regarding role of OT, POC, & discharge recommendations with pt and spouse verbalizing understanding.  Pt had no further questions & when asked whether there were any concerns pt reported none.     Patient left up in chair with all lines intact, call button in reach, RN notified, and spouse present    GOALS:   Multidisciplinary Problems       Occupational Therapy Goals          Problem: Occupational Therapy    Goal Priority Disciplines Outcome Interventions   Occupational Therapy Goal     OT, PT/OT Ongoing, Progressing    Description: Goals to be met by: 2/3/24    Patient will increase functional independence with ADLs by performing:    UB Dressing with SBA  LE Dressing with Supervision.  Grooming while standing at sink with Supervision.  Toileting from bedside commode with SBA for hygiene and clothing management.   Step transfer to bedside commode with SBA  [place] with VAD management during ADLs                             Time Tracking:     OT Date of Treatment: 01/23/24  OT Start Time: 0818  OT Stop Time: 0841  OT Total Time (min): 23 min    Billable Minutes:Self Care/Home Management 10  Therapeutic Activity 13    OT/PRIETO: OT     Number of PRIETO visits since last OT visit: 0    1/23/2024

## 2024-01-23 NOTE — PROCEDURES
Modified Barium Swallow    Patient Name:  Radha Abbott   MRN:  [id number]      Recommendations:     Recommendations:                General Recommendations:  Dysphagia therapy, Follow up with ENT services  Diet recommendations:    Regular Diet - IDDSI Level 7, Moderately thick/Honey thick liquids - IDDSI Level 3   Pt may consume water in between meals only, while utilizing R head turn.   Follow up with ENT for further work up regarding recent LTVF augmentation  Consider repeat MBS following ENT work up    Aspiration Precautions: 1 bite/sip at a time, Alternating bites/sips, Head turned to the right, No straws, Small bites/sips, and Strict aspiration precautions, oral care.   General Precautions: Standard, LVAD, fall  Communication strategies:  none    Referral     Reason for Referral  Patient was referred for a Modified Barium Swallow Study to assess the efficiency of his/her swallow function, rule out aspiration and make recommendations regarding safe dietary consistencies, effective compensatory strategies, and safe eating environment.     Diagnosis: LVAD (left ventricular assist device) present       History:     Past Medical History:   Diagnosis Date    CAD (coronary artery disease)     Diabetes mellitus     HFrEF (heart failure with reduced ejection fraction)     ICD (implantable cardioverter-defibrillator) in place     MI, old      MBSS Hx and impressions:     12/20:  Patient demonstrates severe Oropharyngeal  dysphagia characterized by delayed swallow initiation, reduced epiglottic inversion patterns, reduced hyloaryngeal elevation, resulting in silent aspiration of thin, nectar, honey and pureed solids  . Pt with penetration of pyriform and vellecula residue during cued second swallows to clear residue. Pt with moderate pyrifrom and vellecula residue with thin, nectar and honey thick liquids. Pt requiring cued second swallow to clear residue. Chin tuck was ineffective in preventing aspiration. Cued throat  clear/cough was partially effective in ejecting aspirated from the airway. Pt remains at high risk for aspiration related complications given his overall medical status/weakness.         1/3: Patient demonstrates moderate  Oropharyngeal  dysphagia characterized by delayed swallow initiation, reduced epiglottic inversion, reduced BOT and reduced hylaryngeal excursion patterns resulting in observed tracheal silent aspiration of thin liquids and penetration of nectar thick liquids.  Pt requiring cued cough to clear the aspirated/penetration material. Pt with noted vallecular and pyriform sinus residue with thin and nectar thick liquids, pt requiring cued second swallow to clear residue. There was no observed aspiration or penetration with honey thick liquids, purees and solids       Objective:     Current Respiratory Status: 01/23/24    Alert: yes    Cooperative: yes    Follows Directions: yes    Visualization  Patient was seen in the lateral view  Oral Peripheral Examination  Oral Musculature: WFL, general weakness  Dentition: present and adequate  Secretion Management: oral holding  Volitional Cough: weak  Volitional Swallow: delayed initiation  Voice Prior to PO Intake: weak; hoearse breathy dysphonic    Consistencies Assessed  Thin 30ml  Nectar thick 15ml  Thin-Honey Thick 5ml   Solids 1/4 cracker     Oral Preparation/Oral Phase  BOT residue, requiring secondary swallow to clear with thin and nectar thick liquids.     Pharyngeal Phase     Thin liquids:   - delayed swallow initiation at the level of the pyriforms   - Reduced BOT retraction, resulting in mild vallecular residue   - adequate epiglottis inversion   - Aspiration during the swallow with no sensory response (silent aspiration)  Strategies attempted:   Chin tuck:  did not reduce the risk of airway invasion- silent aspiration during the swallow   Left head turn: increase in vallecular/pyriform sinus residue   Right head turn: Reduced the risk of airway  invasion- No aspiration or penetration observed.     Nectar thick liquids:    -Delayed swallow initiation at the level of the vellecula   -deep penetration of vallecular residue after the swallow- observed sensory response ( throat clear)   -BOT, vallecular, and pyriform sinus residue noted after the swallow- minimal clearance of residue with additional swallows    Honey thick liquids:   - No penetration or aspiration   - No vellecula or pyriform sinus residue     Solids:   - No penetration or aspiration   - No vellecula or pyriform sinus residue     Cervical Esophageal Phase  UES appeared to accommodate all bolus types without stasis or retrograde movement observed     Assessment:     Impressions    Patient demonstrates moderate  pharyngeal dysphagia characterized by delayed swallow initiation, reduced bot retraction and reduced laryngeal vestibule closure resulting in silent aspiration of thin liquids during the swallow and deep penetration of nectar thick liquids after the swallow.  Vellecular and pyriform sinus residue noted with thin liquids and nectar thick liquids, with an increase with nectar thick liquids. R head turn was observed to reduce the amount of vellecular and pyriform sinus residue and reduce the risk for airway invasion; however given pt's inconsistent carryover of strategies across several weeks of structured therapy, consistent implementation of strategies unlikely to occur. Of note, pt with hx of L TVF augmentation during this hospitalization with ongoing concerns for VF dysfunction given pt's ongoing hoarse/low intensity voice. Given presence of aspiration during the swallow with minimal pharyngeal residue, pt would benefit from ongoing ENT follow up at the post acute setting as poor airway protection remains pt's primary barrier to swallow safety.     Prognosis: Good    Barriers:  Fatigue    Plan  - Upgrade diet to regular solids and honey thick liquids   - Pt may consume water only  inbetween meals while utilizing a R head turn.   - SLP will trial nectar thick liquids at the bedside for the purpose of upgrading the pts diet further   - Pt will continue to utilize swallow exercises.    - SLP will continue to follow     Treatment/education:   SLP returning to pts room following the study to provide education regarding diet upgrade and swallow strategies. Pt appearing withdrawn and resistant to diet recommendations (asking whether his swallow function will delay his discharge). Provided education regarding swallow study impressions, ongoing SLP POC, continued follow up with speech following discharge from acute care setting, free water protocol, reason for honey thick liquids at this time, and silent aspiration/aspiration related pulmonary complications. Spouse and pt both verbalizing understanding, however would benefit from ongoing education as full understanding of swallow dysfunction not achieved this date. Pt was able to demonstrate how to safely consume thin liquids by utilizing a R head turn. However, concerns remain for maintenance of strategies during meals.     **Throughout this hospitalization, pt with inconsistent participation in SLP therapy sessions/compliance with SLP diet consistency recommendations. Repeat MBS obtained this date given pt's consistent consumption of an unmodified diet despite documented silent aspiration on the prior two studies. Attempted to utilize multiple swallow strategies to provide the safest/efficient recommendations, however ongoing concern remains for pts ability to maintain strategies during meals as poor carry over of skilled speech recommendations consistently exhibited throughout current hospitalization. Recommend close monitoring of utilization of strategies during subacute tx sessions.       Goals:   Multidisciplinary Problems       SLP Goals          Problem: SLP    Goal Priority Disciplines Outcome   SLP Goal     SLP Ongoing, Progressing    Description: Speech Language Pathology Goals  Goals expected to be met by 12/24    1. Pt will participate in ongoing assessment of swallow function to help determine least restrictive diet                            Plan:   Patient to be seen:  Therapy Frequency: 4 x/week   Plan of Care expires:     Plan of Care reviewed with:  patient, spouse        Discharge recommendations:  High Intensity Therapy   Barriers to Discharge:  None    Time Tracking:   SLP Treatment Date:   01/23/24  Speech Start Time:  0945  Speech Stop Time:  1045     Speech Total Time (min):  60 min    01/23/2024

## 2024-01-23 NOTE — NURSING
Blood transfusion done. Doppler rechecked 92/0. Dr. Mcdonald notified. No new orders at this time. Will continue to monitor.

## 2024-01-23 NOTE — PROGRESS NOTES
Patient was seen today in the hospital. His equipment reconciliation was completed prior to discharge early tomorrow.  Patient has all of his equipment with him, aside from his ICU cover (which is for ICU use only), and it was documented in the VAD flowsheet.

## 2024-01-24 VITALS
HEART RATE: 96 BPM | OXYGEN SATURATION: 90 % | TEMPERATURE: 98 F | HEIGHT: 72 IN | WEIGHT: 165.38 LBS | SYSTOLIC BLOOD PRESSURE: 76 MMHG | BODY MASS INDEX: 22.4 KG/M2 | RESPIRATION RATE: 17 BRPM

## 2024-01-24 LAB
ACID FAST MOD KINY STN SPEC: NORMAL
ALBUMIN SERPL BCP-MCNC: 2.1 G/DL (ref 3.5–5.2)
ALP SERPL-CCNC: 101 U/L (ref 55–135)
ALT SERPL W/O P-5'-P-CCNC: 16 U/L (ref 10–44)
ANION GAP SERPL CALC-SCNC: 8 MMOL/L (ref 8–16)
AST SERPL-CCNC: 15 U/L (ref 10–40)
BASOPHILS # BLD AUTO: 0.03 K/UL (ref 0–0.2)
BASOPHILS NFR BLD: 0.6 % (ref 0–1.9)
BILIRUB DIRECT SERPL-MCNC: 0.2 MG/DL (ref 0.1–0.3)
BILIRUB SERPL-MCNC: 0.4 MG/DL (ref 0.1–1)
BNP SERPL-MCNC: 963 PG/ML (ref 0–99)
BUN SERPL-MCNC: 57 MG/DL (ref 8–23)
CALCIUM SERPL-MCNC: 8.4 MG/DL (ref 8.7–10.5)
CHLORIDE SERPL-SCNC: 111 MMOL/L (ref 95–110)
CO2 SERPL-SCNC: 18 MMOL/L (ref 23–29)
CREAT SERPL-MCNC: 3.1 MG/DL (ref 0.5–1.4)
CRP SERPL-MCNC: 43.8 MG/L (ref 0–8.2)
DIFFERENTIAL METHOD BLD: ABNORMAL
EOSINOPHIL # BLD AUTO: 0.3 K/UL (ref 0–0.5)
EOSINOPHIL NFR BLD: 4.6 % (ref 0–8)
ERYTHROCYTE [DISTWIDTH] IN BLOOD BY AUTOMATED COUNT: 18.1 % (ref 11.5–14.5)
EST. GFR  (NO RACE VARIABLE): 22 ML/MIN/1.73 M^2
GLUCOSE SERPL-MCNC: 71 MG/DL (ref 70–110)
HCT VFR BLD AUTO: 25 % (ref 40–54)
HGB BLD-MCNC: 7.7 G/DL (ref 14–18)
IMM GRANULOCYTES # BLD AUTO: 0.03 K/UL (ref 0–0.04)
IMM GRANULOCYTES NFR BLD AUTO: 0.6 % (ref 0–0.5)
INR PPP: 2.5 (ref 0.8–1.2)
LDH SERPL L TO P-CCNC: 292 U/L (ref 110–260)
LYMPHOCYTES # BLD AUTO: 0.9 K/UL (ref 1–4.8)
LYMPHOCYTES NFR BLD: 17.3 % (ref 18–48)
MAGNESIUM SERPL-MCNC: 1.4 MG/DL (ref 1.6–2.6)
MCH RBC QN AUTO: 27.9 PG (ref 27–31)
MCHC RBC AUTO-ENTMCNC: 30.8 G/DL (ref 32–36)
MCV RBC AUTO: 91 FL (ref 82–98)
MONOCYTES # BLD AUTO: 0.5 K/UL (ref 0.3–1)
MONOCYTES NFR BLD: 9.6 % (ref 4–15)
MYCOBACTERIUM SPEC QL CULT: NORMAL
NEUTROPHILS # BLD AUTO: 3.6 K/UL (ref 1.8–7.7)
NEUTROPHILS NFR BLD: 67.3 % (ref 38–73)
NRBC BLD-RTO: 0 /100 WBC
PHOSPHATE SERPL-MCNC: 3.5 MG/DL (ref 2.7–4.5)
PLATELET # BLD AUTO: 343 K/UL (ref 150–450)
PMV BLD AUTO: 8.6 FL (ref 9.2–12.9)
POTASSIUM SERPL-SCNC: 4.5 MMOL/L (ref 3.5–5.1)
PREALB SERPL-MCNC: 31 MG/DL (ref 20–43)
PROT SERPL-MCNC: 6.6 G/DL (ref 6–8.4)
PROTHROMBIN TIME: 25.3 SEC (ref 9–12.5)
RBC # BLD AUTO: 2.76 M/UL (ref 4.6–6.2)
SODIUM SERPL-SCNC: 137 MMOL/L (ref 136–145)
WBC # BLD AUTO: 5.39 K/UL (ref 3.9–12.7)

## 2024-01-24 PROCEDURE — 86140 C-REACTIVE PROTEIN: CPT | Performed by: STUDENT IN AN ORGANIZED HEALTH CARE EDUCATION/TRAINING PROGRAM

## 2024-01-24 PROCEDURE — 25000003 PHARM REV CODE 250: Performed by: INTERNAL MEDICINE

## 2024-01-24 PROCEDURE — 27000248 HC VAD-ADDITIONAL DAY

## 2024-01-24 PROCEDURE — 93750 INTERROGATION VAD IN PERSON: CPT | Mod: ,,, | Performed by: INTERNAL MEDICINE

## 2024-01-24 PROCEDURE — 84100 ASSAY OF PHOSPHORUS: CPT | Performed by: INTERNAL MEDICINE

## 2024-01-24 PROCEDURE — 85610 PROTHROMBIN TIME: CPT | Performed by: PHYSICIAN ASSISTANT

## 2024-01-24 PROCEDURE — 80048 BASIC METABOLIC PNL TOTAL CA: CPT | Performed by: INTERNAL MEDICINE

## 2024-01-24 PROCEDURE — 25000003 PHARM REV CODE 250: Performed by: STUDENT IN AN ORGANIZED HEALTH CARE EDUCATION/TRAINING PROGRAM

## 2024-01-24 PROCEDURE — 99233 SBSQ HOSP IP/OBS HIGH 50: CPT | Mod: ,,, | Performed by: NURSE PRACTITIONER

## 2024-01-24 PROCEDURE — 85025 COMPLETE CBC W/AUTO DIFF WBC: CPT | Performed by: PHYSICIAN ASSISTANT

## 2024-01-24 PROCEDURE — 83880 ASSAY OF NATRIURETIC PEPTIDE: CPT | Performed by: STUDENT IN AN ORGANIZED HEALTH CARE EDUCATION/TRAINING PROGRAM

## 2024-01-24 PROCEDURE — 25000003 PHARM REV CODE 250: Performed by: PHYSICIAN ASSISTANT

## 2024-01-24 PROCEDURE — 84134 ASSAY OF PREALBUMIN: CPT | Performed by: NURSE PRACTITIONER

## 2024-01-24 PROCEDURE — 36415 COLL VENOUS BLD VENIPUNCTURE: CPT | Performed by: NURSE PRACTITIONER

## 2024-01-24 PROCEDURE — 92526 ORAL FUNCTION THERAPY: CPT

## 2024-01-24 PROCEDURE — 80076 HEPATIC FUNCTION PANEL: CPT | Performed by: INTERNAL MEDICINE

## 2024-01-24 PROCEDURE — 25000003 PHARM REV CODE 250: Performed by: NURSE PRACTITIONER

## 2024-01-24 PROCEDURE — 83735 ASSAY OF MAGNESIUM: CPT | Performed by: INTERNAL MEDICINE

## 2024-01-24 PROCEDURE — 83615 LACTATE (LD) (LDH) ENZYME: CPT | Performed by: STUDENT IN AN ORGANIZED HEALTH CARE EDUCATION/TRAINING PROGRAM

## 2024-01-24 RX ORDER — WARFARIN SODIUM 5 MG/1
TABLET ORAL
Qty: 20 TABLET | Refills: 11
Start: 2024-01-24 | End: 2024-02-02 | Stop reason: DRUGHIGH

## 2024-01-24 RX ORDER — TORSEMIDE 20 MG/1
80 TABLET ORAL DAILY
Status: DISCONTINUED | OUTPATIENT
Start: 2024-01-25 | End: 2024-01-24 | Stop reason: HOSPADM

## 2024-01-24 RX ORDER — LANOLIN ALCOHOL/MO/W.PET/CERES
400 CREAM (GRAM) TOPICAL DAILY
Refills: 0
Start: 2024-01-24 | End: 2024-02-12 | Stop reason: SDUPTHER

## 2024-01-24 RX ORDER — TORSEMIDE 20 MG/1
80 TABLET ORAL 2 TIMES DAILY
Status: DISCONTINUED | OUTPATIENT
Start: 2024-01-24 | End: 2024-01-24

## 2024-01-24 RX ORDER — VENLAFAXINE 37.5 MG/1
37.5 TABLET ORAL DAILY
Qty: 30 TABLET | Refills: 11
Start: 2024-01-24 | End: 2024-02-01 | Stop reason: SDUPTHER

## 2024-01-24 RX ORDER — CHOLECALCIFEROL (VITAMIN D3) 25 MCG
1000 TABLET ORAL DAILY
Start: 2024-01-24 | End: 2024-02-01 | Stop reason: SDUPTHER

## 2024-01-24 RX ORDER — OMEGA-3-ACID ETHYL ESTERS 1 G/1
2 CAPSULE, LIQUID FILLED ORAL 2 TIMES DAILY
Qty: 360 CAPSULE | Refills: 3
Start: 2024-01-24 | End: 2024-02-01 | Stop reason: SDUPTHER

## 2024-01-24 RX ORDER — HYDRALAZINE HYDROCHLORIDE 50 MG/1
50 TABLET, FILM COATED ORAL EVERY 8 HOURS
Qty: 90 TABLET | Refills: 11
Start: 2024-01-24 | End: 2024-02-01 | Stop reason: SDUPTHER

## 2024-01-24 RX ORDER — FAMOTIDINE 20 MG/1
20 TABLET, FILM COATED ORAL DAILY
Qty: 30 TABLET | Refills: 11 | Status: SHIPPED | OUTPATIENT
Start: 2024-01-24 | End: 2024-02-01 | Stop reason: SDUPTHER

## 2024-01-24 RX ORDER — TRAZODONE HYDROCHLORIDE 50 MG/1
25 TABLET ORAL NIGHTLY
Qty: 15 TABLET | Refills: 11
Start: 2024-01-24 | End: 2024-02-01 | Stop reason: SDUPTHER

## 2024-01-24 RX ORDER — FLUTICASONE PROPIONATE 50 MCG
2 SPRAY, SUSPENSION (ML) NASAL DAILY
Refills: 0 | Status: ON HOLD
Start: 2024-01-24

## 2024-01-24 RX ORDER — GABAPENTIN 100 MG/1
100 CAPSULE ORAL 2 TIMES DAILY
Qty: 60 CAPSULE | Refills: 11 | Status: SHIPPED | OUTPATIENT
Start: 2024-01-24 | End: 2024-02-12 | Stop reason: SDUPTHER

## 2024-01-24 RX ORDER — GABAPENTIN 100 MG/1
100 CAPSULE ORAL 2 TIMES DAILY
Qty: 60 CAPSULE | Refills: 11 | Status: CANCELLED
Start: 2024-01-24 | End: 2025-01-23

## 2024-01-24 RX ORDER — HYDRALAZINE HYDROCHLORIDE 50 MG/1
50 TABLET, FILM COATED ORAL EVERY 8 HOURS
Status: DISCONTINUED | OUTPATIENT
Start: 2024-01-24 | End: 2024-01-24 | Stop reason: HOSPADM

## 2024-01-24 RX ORDER — ACETAMINOPHEN 500 MG
1000 TABLET ORAL EVERY 8 HOURS PRN
Refills: 0 | Status: ON HOLD
Start: 2024-01-24

## 2024-01-24 RX ADMIN — CHOLECALCIFEROL TAB 25 MCG (1000 UNIT) 1000 UNITS: 25 TAB at 09:01

## 2024-01-24 RX ADMIN — GABAPENTIN 100 MG: 100 CAPSULE ORAL at 09:01

## 2024-01-24 RX ADMIN — TORSEMIDE 80 MG: 20 TABLET ORAL at 09:01

## 2024-01-24 RX ADMIN — FAMOTIDINE 20 MG: 20 TABLET ORAL at 09:01

## 2024-01-24 RX ADMIN — VENLAFAXINE 37.5 MG: 37.5 TABLET ORAL at 09:01

## 2024-01-24 RX ADMIN — AMIODARONE HYDROCHLORIDE 400 MG: 200 TABLET ORAL at 09:01

## 2024-01-24 RX ADMIN — HYDRALAZINE HYDROCHLORIDE 25 MG: 25 TABLET ORAL at 05:01

## 2024-01-24 RX ADMIN — ACETAMINOPHEN 1000 MG: 500 TABLET ORAL at 09:01

## 2024-01-24 RX ADMIN — SENNOSIDES AND DOCUSATE SODIUM 2 TABLET: 8.6; 5 TABLET ORAL at 09:01

## 2024-01-24 NOTE — PT/OT/SLP PROGRESS
Speech Language Pathology Treatment    Patient Name:  Radha Abbott   MRN:  [id number]  Admitting Diagnosis: LVAD (left ventricular assist device) present    Recommendations:                 General Recommendations:  ENT evaluation, ongoing dyphagia therapy  Diet recommendations:  Regular Diet - IDDSI Level 7, Liquid Diet Level: Moderately thick liquids - IDDSI Level 3   Aspiration Precautions: 1 bite/sip at a time, Alternating bites/sips, Assistance with thickening liquids, Frequent oral care, Head turned to the right, Monitor for s/s of aspiration, Small bites/sips, and Strict aspiration precautions   General Precautions: Standard, LVAD  Communication strategies:  none    Assessment:     Radha Abbott is a 63 y.o. male with an SLP diagnosis of Dysphagia. Returning to pt jose, this date prior to pt discharge for ongoing education of repeat MBSS on 1/23/24 and impressions/recommendations. Would recommend pt continue to receive dysphagia therapy following discharge. Pt would benefit from follow up with ENT services regarding LTFV dysfunction. See MBSS note dated 1/23/24 for further recommendations.   Subjective     Pt seen with spouse at the bedside this date.   Patient goals: none were stated      Pain/Comfort:  Pain Rating 1: 0/10    Respiratory Status: Room air    Objective:     Has the patient been evaluated by SLP for swallowing?   Yes  Keep patient NPO? No   Current Respiratory Status:    Room air     Pt seen for ongoing education regarding MBS and impressions. SLP provided extensive education regarding MBS results (including ongoing silent aspiration of thin liquids across 3 consecutive MBS performed throughout this hospitalization), meaning of silent aspiration/aspiration related pulmonary complications, ongoing recommendations for honey thick liquids, free water protocol and need for ongoing speech services following discharge from acute hospitalization. Pt and wife verbalizing understanding but would benefit from  ongoing education. Wife with further questions regarding timeline of swallow rehabilitation, ongoing SLP POC following discharge, and follow up with ENT services regarding LTVF augmentation. All questions answered within SLP POC. Pt demonstrated utilization of swallow strategies ( R head turn while drinking water) INDEP x1. Pt verbalizing understanding of strategies and diet recommendations. SLP will continue to follow.     Goals:   Multidisciplinary Problems       SLP Goals       Not on file              Multidisciplinary Problems (Resolved)          Problem: SLP    Goal Priority Disciplines Outcome   SLP Goal   (Resolved)     SLP Met   Description: Speech Language Pathology Goals  Goals expected to be met by 12/24    1. Pt will participate in ongoing assessment of swallow function to help determine least restrictive diet                            Plan:     Patient to be seen:  4 x/week   Plan of Care reviewed with:  patient, spouse   SLP Follow-Up:  Yes       Discharge recommendations:  High Intensity Therapy   Barriers to Discharge:  None    Time Tracking:     SLP Treatment Date:   01/24/24  Speech Start Time:  1018  Speech Stop Time:  1029     Speech Total Time (min):  11 min    Billable Minutes: Self Care/Home Management Training 11    01/24/2024

## 2024-01-24 NOTE — PLAN OF CARE
Problem: Adult Inpatient Plan of Care  Goal: Plan of Care Review  Outcome: Met  Goal: Patient-Specific Goal (Individualized)  Outcome: Met  Goal: Absence of Hospital-Acquired Illness or Injury  Outcome: Met  Goal: Optimal Comfort and Wellbeing  Outcome: Met  Goal: Readiness for Transition of Care  Outcome: Met     Problem: Fluid and Electrolyte Imbalance (Acute Kidney Injury/Impairment)  Goal: Fluid and Electrolyte Balance  Outcome: Met     Problem: Oral Intake Inadequate (Acute Kidney Injury/Impairment)  Goal: Optimal Nutrition Intake  Outcome: Met     Problem: Renal Function Impairment (Acute Kidney Injury/Impairment)  Goal: Effective Renal Function  Outcome: Met     Problem: Infection  Goal: Absence of Infection Signs and Symptoms  Outcome: Met     Problem: Adjustment to Illness (Heart Failure)  Goal: Optimal Coping  Outcome: Met     Problem: Fluid Imbalance (Heart Failure)  Goal: Fluid Balance  Outcome: Met     Problem: Functional Ability Impaired (Heart Failure)  Goal: Optimal Functional Ability  Outcome: Met     Problem: Cardiac Output Decreased  Goal: Effective Cardiac Output  Outcome: Met     Problem: Physical Therapy  Goal: Physical Therapy Goal  Description: Goals to be met by: 23     Patient will increase functional independence with mobility by performin. Sit to stand transfer with modified independence  2. Bed to chair transfer with supervision  3. Gait  x 150 feet with supervision using physician approved AD with standing rest breaks prn.   4. Lower extremity exercise program x30 reps per handout, with independence  5. Recite 3/3 sternal precautions and remain complaint with precautions throughout session with no verbal cues      Outcome: Met     Problem: Fall Injury Risk  Goal: Absence of Fall and Fall-Related Injury  Outcome: Met     Problem: Occupational Therapy  Goal: Occupational Therapy Goal  Description: Goals to be met by: 2/3/24    Patient will increase functional independence  with ADLs by performing:    UB Dressing with SBA  LE Dressing with Supervision.  Grooming while standing at sink with Supervision.  Toileting from bedside commode with SBA for hygiene and clothing management.   Step transfer to bedside commode with SBA  [place] with VAD management during ADLs        Outcome: Met     Problem: Coping Ineffective  Goal: Effective Coping  Outcome: Met     Problem: Skin Injury Risk Increased  Goal: Skin Health and Integrity  Outcome: Met     Problem: Adjustment to Device (Ventricular Assist Device)  Goal: Optimal Adjustment to Device  Outcome: Met     Problem: Hemodynamic Instability (Ventricular Assist Device)  Goal: Optimal Blood Flow  Outcome: Met     Problem: Infection (Ventricular Assist Device)  Goal: Absence of Infection Signs and Symptoms  Outcome: Met     Problem: Right-Sided Heart Compromise (Ventricular Assist Device)  Goal: Effective Right-Sided Heart Function  Outcome: Met     Problem: SLP  Goal: SLP Goal  Description: Speech Language Pathology Goals  Goals expected to be met by 12/24    1. Pt will participate in ongoing assessment of swallow function to help determine least restrictive diet       Outcome: Met     Problem: Impaired Wound Healing  Goal: Optimal Wound Healing  Outcome: Met

## 2024-01-24 NOTE — ASSESSMENT & PLAN NOTE
-RD and SLP following  -Minced and moist diet. all liquids should be thickened to a honey thick consistency- pt is a silent aspirator per most recent MBSS   -Boost GC w/ thickener for optimization of protein and calorie intake   -/P MBSS 1/23-->Regular diet w honey thickened liquids(thin liquid aspirator).

## 2024-01-24 NOTE — PROGRESS NOTES
[facility] - Cardiology Stepdown  Heart Transplant  Progress Note    Patient Name: Radha Abbott  MRN: [id number]  Admission Date: 11/9/2023  Hospital Length of Stay: 76 days  Attending Physician: Sajan Hurley, *  Primary Care Provider: Vasu Kong MD  Principal Problem:LVAD (left ventricular assist device) present    Subjective:   Interval History: NAEON. No complaints. Advanced diet to regular honey-thickened yesterday.     Continuous Infusions:   sodium chloride 0.9%       Scheduled Meds:   acetaminophen  1,000 mg Per NG tube TID    amiodarone  400 mg Oral Daily    balsam peru-castor oiL   Topical (Top) BID    famotidine  20 mg Oral Daily    fluticasone propionate  2 spray Each Nostril Daily    gabapentin  100 mg Oral BID    hydrALAZINE  50 mg Oral Q8H    oxymetazoline  2 spray Each Nostril BID    polyethylene glycol  17 g Oral Daily    psyllium husk (aspartame)  1 packet Oral Daily    senna-docusate 8.6-50 mg  2 tablet Oral Daily    torsemide  80 mg Oral BID loop    traZODone  25 mg Oral QHS    venlafaxine  37.5 mg Oral Daily    vitamin D  1,000 Units Oral Daily    warfarin  2.5 mg Oral Every Tues, Thurs, Sat, Sun    warfarin  5 mg Oral Every Mon, Wed, Fri     PRN Meds:0.9%  NaCl infusion (for blood administration), dextrose 10%, dextrose 10%, dextrose 10%, dextrose 10%, glucagon (human recombinant), glucose, glucose, hydrALAZINE, insulin aspart U-100, Flushing PICC/Midline Protocol **AND** [DISCONTINUED] sodium chloride 0.9% **AND** sodium chloride 0.9%, traMADoL, zolpidem    Review of patient's allergies indicates:  No Known Allergies  Objective:     Vital Signs (Most Recent):  Temp: 98.7 °F (37.1 °C) (01/24/24 0352)  Pulse: 94 (01/24/24 0610)  Resp: 18 (01/24/24 0352)  BP: (!) 88/0 (01/24/24 0500)  SpO2: 95 % (01/24/24 0352) Vital Signs (24h Range):  Temp:  [97.3 °F (36.3 °C)-98.7 °F (37.1 °C)] 98.7 °F (37.1 °C)  Pulse:  [] 94  Resp:  [18] 18  SpO2:  [95 %-100 %] 95 %  BP: ()/(0-82)  88/0     Patient Vitals for the past 72 hrs (Last 3 readings):   Weight   01/23/24 0836 76.2 kg (167 lb 15.9 oz)   01/23/24 0719 74.8 kg (165 lb)   01/22/24 1407 75 kg (165 lb 5.5 oz)       Body mass index is 22.78 kg/m².      Intake/Output Summary (Last 24 hours) at 1/24/2024 0805  Last data filed at 1/23/2024 1817  Gross per 24 hour   Intake 560.33 ml   Output 150 ml   Net 410.33 ml            Telemetry: SR       Physical Exam  Constitutional:       Comments: Cachectic, temporal wasting   HENT:      Head: Normocephalic and atraumatic.   Eyes:      Conjunctiva/sclera: Conjunctivae normal.      Pupils: Pupils are equal, round, and reactive to light.   Neck:      Comments: Do not appreciate elevated JVP  Cardiovascular:      Rate and Rhythm: Normal rate and regular rhythm.      Comments: Smooth VAD hum  Pulmonary:      Effort: Pulmonary effort is normal.      Breath sounds: Normal breath sounds.   Abdominal:      General: Bowel sounds are normal.      Palpations: Abdomen is soft.   Musculoskeletal:         General: No swelling. Normal range of motion.      Cervical back: Normal range of motion and neck supple.   Skin:     General: Skin is warm and dry.      Capillary Refill: Capillary refill takes 2 to 3 seconds.   Neurological:      General: No focal deficit present.      Mental Status: He is alert and oriented to person, place, and time.            Significant Labs:  CBC:  Recent Labs   Lab 01/22/24  0502 01/23/24  0506 01/24/24  0409   WBC 6.08 5.00 5.39   RBC 2.22* 2.41* 2.76*   HGB 6.2* 6.7* 7.7*   HCT 20.8* 22.9* 25.0*   * 358 343   MCV 94 95 91   MCH 27.9 27.8 27.9   MCHC 29.8* 29.3* 30.8*       BNP:  Recent Labs   Lab 01/19/24  0614 01/22/24  0502 01/24/24  0409   BNP 1,278* 1,242* 963*       CMP:  Recent Labs   Lab 01/21/24  0557 01/22/24  0502 01/23/24  0506   * 81 74   CALCIUM 8.9 8.5* 8.3*   ALBUMIN 2.3* 2.1* 2.0*   PROT 7.7 6.7 6.6   * 137 138   K 5.0 5.0 4.7   CO2 20* 19* 18*   CL  107 110 111*   BUN 59* 62* 60*   CREATININE 3.4* 3.3* 3.3*   ALKPHOS 133 115 107   ALT 26 21 18   AST 21 17 16   BILITOT 0.3 0.2 0.3        Coagulation:   Recent Labs   Lab 01/22/24  0502 01/23/24  0506 01/24/24  0409   INR 2.5* 2.2* 2.5*       LDH:  Recent Labs   Lab 01/22/24  0502 01/23/24  0506   * 292*       Microbiology:  Microbiology Results (last 7 days)       Procedure Component Value Units Date/Time    AFB Culture & Smear [[id number]] Collected: 12/06/23 1532    Order Status: Completed Specimen: Body Fluid from Lung, Right Updated: 01/18/24 0928     AFB Culture & Smear Culture in progress      Culture in progress     AFB CULTURE STAIN No acid fast bacilli seen.    Narrative:      Bronchial Wash            I have reviewed all pertinent labs within the past 24 hours.    Estimated Creatinine Clearance: 25.3 mL/min (A) (based on SCr of 3.3 mg/dL (H)).    Diagnostic Results:  I have reviewed and interpreted all pertinent imaging results/findings within the past 24 hours.  Assessment and Plan:     63 year old male with hx of CAD s/p 3v CABG (unclear anatomy, 2009), ICM with a recent EF of 15-20% s/p ICD (medtronik 2009), DM2 (a1c 7.7), HTN, HLD, Vfib on amio who presents to the ED with CC of SOB     Pt was recently admitted to [facility] as a transfer.  He was started on a Lasix gtt and did well.  Started on gDMT and discharged home on  5 with plans to follow up in HTS clinic for ongoing transplant evaluation at another facility.  He says that about a few days ago he started to notice LE swelling.  This turned into Nelson and orthopnea.  He says he can't walk to the bathroom without getting SOB.  He also complains of weight gain.  He takes torsemide 20mg BID at home and was told to trial additional Lasix which he did without any improvement.  He was rx metolazone but has not been filled.  He came to the ED     In the ED he was AF with stable VS on RA.  CBC showing chronic anemia.  CMP notable for acute on chronic  CKD with baseline around 2.1 and Cr now 2.6.  BNP elevated.  Lactate neg.  CXR showing pulmonary edema.  I evaluated the pt at the bedside.  Bedside TTE showing CVP >15.  He was subsequently admitted to the CCU for diuresis.     He was continued on his home  and was started on a lasix gtt, which he responded well to overnight (net -1700cc. He feels much better this morning as well. Our transplant coordinators have been working on insurance approval and he is now being transferred to [facility] service for transplant evaluation.     * LVAD (left ventricular assist device) present  -HeartMate 3 Implanted  12/1/2023  as DT  -HTS Primary  -Implanted by Dr. Washington  -Cont Coumadin, dosing by PharmD.  Goal INR 2.0-3.0 . Therapeutic today  -Antiplatelets Not on ASA  -LDH is stable overall today. Will continue to monitor daily.  -Weaned off midodrine.  -Speed set at  4900   rpm, LSL 4500 rpm   -Interrogation notable for no events  -ECHO 1/2/24 mild TR, LVEDD 6.25 with HM3 set to 4900 rpm   -RHC 1/22/24 with RA 13, PCWP 22, PA 50/25, CO 5.8, CI 3.0   -Not listed for OHTx, declined for OHTX due to comorbidities   -PT/OT/Speech. Recommending rehab.      Procedure: Device Interrogation Including analysis of device parameters  Current Settings: Ventricular Assist Device  Review of device function is stable/unstable stable        1/24/2024     4:57 AM 1/23/2024    11:53 PM 1/23/2024     8:37 PM 1/23/2024     4:10 PM 1/23/2024    12:54 PM 1/23/2024     7:20 AM 1/23/2024     4:47 AM   TXP LVAD INTERROGATIONS   Type HeartMate3 HeartMate3 HeartMate3 HeartMate3 HeartMate3 HeartMate3 HeartMate3   Flow 3.9 3.8 3.9 3.6 3.9 3.7 3.6   Speed 5000 5000 5000 5000 5000 5000 5000   PI 4.6 5.3 4.5 5.9 4.3 4.9 5   Power (Carrington) 3.4 3.4 3.4 3.5 3.4 3.4 3.4   LSL 4600 4600 4600 4600 4600 4600    Pulsatility Intermittent pulse No Pulse Pulse Intermittent pulse Intermittent pulse Intermittent pulse Intermittent pulse         Moderate malnutrition  -RD and  SLP following  -Minced and moist diet. all liquids should be thickened to a honey thick consistency- pt is a silent aspirator per most recent MBSS   -Boost GC w/ thickener for optimization of protein and calorie intake   -/P MBSS 1/23-->Regular diet w honey thickened liquids(thin liquid aspirator).     Acute renal failure with acute tubular necrosis superimposed on stage 3b chronic kidney disease  IVÁN on CKD2. Baseline Cr of 1.7-2.0.   - Hold ARNi  -Trend BMPs daily   -Nephrology following   -SLED/SCUF for volume removal began on 12/1. He is anuric  -Did not respond to diuretic challenge with 240 mg IV Lasix, 1000 mg Diuril, and 500 mg IV Diamox done 12/14  -Diuretic challenge 1/8 with 160 mg IVP Lasix,  -Now on HD TThSat; but last HD was on 1/10  -Tunneled trialysis line placed 12/22 by IR  -RHC 1/22 with elevated filling pressures (RA 13, W 22). Discussed with Nephrology, will give 160 mg IVP Lasix and plan to start maintenance diuretics with torsemide 20 mg QD possibly tomorrow     Right parotid mass  - 2.2cm R. Parotid mass noted.   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 Can follow up as outpatient w/ ENT for FNA. No further interventions needed as inpatient.     Lymphadenopathy  +ill contacts  Respiratory panel negative  Strep test negative.   Throat culture showing pseudomonas. As per ID , will treat w/ 5 day course of zosyn (therapy completed 1/10)    Unilateral complete vocal fold paralysis  -Appreciate ENT's help  -S/P augmentation of paralyzed left vocal cord 12/18    Dysphonia  -Speech following closely  -Appreciate ENT's help. S/P augmentation of paralyzed left vocal cord 12/18    Depressed mood  -Psychiatry consulted for depressed mood, SI when left alone  -Recommend sitter when alone (possibly with transition to stepdown).  -Psych reconsulted 1/2, agree with venlafaxine and trazadone  -Remeron added as appetite stimulant, will discontinue as eating well and monitor response     IVÁN  (acute kidney injury)  -Nephrology following      Acute on chronic combined systolic and diastolic CHF, NYHA class 4  Pt with known ICM with an EF of 25% on home  presented with decompensated HF.    -S/P LVAD    PAF (paroxysmal atrial fibrillation)  Known hx of pAF. In sinus rhythm on admission, but 1 run of AF RVR overnight. He spontaneously converted.  - Continue home amiodarone 400mg qd  - Continue AC      Type 2 diabetes mellitus without complication, without long-term current use of insulin  -A1c 7.6, not insulin dependent  - Endocrine following, appreciate assistance with blood glucose management    CAD (coronary artery disease)  -S/p 3vCABG in 2009  -Lipitor on hold 2/2 mild transaminitis  -Not on ASA post VAD    Ventricular tachycardia  Hx VT s/p ICD placement (medtronic 2009)  - Continue home amio 400mg qd        [place], NP  Heart Transplant  [facility] - Cardiology Stepdown

## 2024-01-24 NOTE — SUBJECTIVE & OBJECTIVE
Interval History: NAEON. No complaints. Advanced diet to regular honey-thickened yesterday.     Continuous Infusions:   sodium chloride 0.9%       Scheduled Meds:   acetaminophen  1,000 mg Per NG tube TID    amiodarone  400 mg Oral Daily    balsam peru-castor oiL   Topical (Top) BID    famotidine  20 mg Oral Daily    fluticasone propionate  2 spray Each Nostril Daily    gabapentin  100 mg Oral BID    hydrALAZINE  50 mg Oral Q8H    oxymetazoline  2 spray Each Nostril BID    polyethylene glycol  17 g Oral Daily    psyllium husk (aspartame)  1 packet Oral Daily    senna-docusate 8.6-50 mg  2 tablet Oral Daily    torsemide  80 mg Oral BID loop    traZODone  25 mg Oral QHS    venlafaxine  37.5 mg Oral Daily    vitamin D  1,000 Units Oral Daily    warfarin  2.5 mg Oral Every Tues, Thurs, Sat, Sun    warfarin  5 mg Oral Every Mon, Wed, Fri     PRN Meds:0.9%  NaCl infusion (for blood administration), dextrose 10%, dextrose 10%, dextrose 10%, dextrose 10%, glucagon (human recombinant), glucose, glucose, hydrALAZINE, insulin aspart U-100, Flushing PICC/Midline Protocol **AND** [DISCONTINUED] sodium chloride 0.9% **AND** sodium chloride 0.9%, traMADoL, zolpidem    Review of patient's allergies indicates:  No Known Allergies  Objective:     Vital Signs (Most Recent):  Temp: 98.7 °F (37.1 °C) (01/24/24 0352)  Pulse: 94 (01/24/24 0610)  Resp: 18 (01/24/24 0352)  BP: (!) 88/0 (01/24/24 0500)  SpO2: 95 % (01/24/24 0352) Vital Signs (24h Range):  Temp:  [97.3 °F (36.3 °C)-98.7 °F (37.1 °C)] 98.7 °F (37.1 °C)  Pulse:  [] 94  Resp:  [18] 18  SpO2:  [95 %-100 %] 95 %  BP: ()/(0-82) 88/0     Patient Vitals for the past 72 hrs (Last 3 readings):   Weight   01/23/24 0836 76.2 kg (167 lb 15.9 oz)   01/23/24 0719 74.8 kg (165 lb)   01/22/24 1407 75 kg (165 lb 5.5 oz)       Body mass index is 22.78 kg/m².      Intake/Output Summary (Last 24 hours) at 1/24/2024 0805  Last data filed at 1/23/2024 1817  Gross per 24 hour   Intake  560.33 ml   Output 150 ml   Net 410.33 ml            Telemetry: SR       Physical Exam  Constitutional:       Comments: Cachectic, temporal wasting   HENT:      Head: Normocephalic and atraumatic.   Eyes:      Conjunctiva/sclera: Conjunctivae normal.      Pupils: Pupils are equal, round, and reactive to light.   Neck:      Comments: Do not appreciate elevated JVP  Cardiovascular:      Rate and Rhythm: Normal rate and regular rhythm.      Comments: Smooth VAD hum  Pulmonary:      Effort: Pulmonary effort is normal.      Breath sounds: Normal breath sounds.   Abdominal:      General: Bowel sounds are normal.      Palpations: Abdomen is soft.   Musculoskeletal:         General: No swelling. Normal range of motion.      Cervical back: Normal range of motion and neck supple.   Skin:     General: Skin is warm and dry.      Capillary Refill: Capillary refill takes 2 to 3 seconds.   Neurological:      General: No focal deficit present.      Mental Status: He is alert and oriented to person, place, and time.            Significant Labs:  CBC:  Recent Labs   Lab 01/22/24  0502 01/23/24  0506 01/24/24  0409   WBC 6.08 5.00 5.39   RBC 2.22* 2.41* 2.76*   HGB 6.2* 6.7* 7.7*   HCT 20.8* 22.9* 25.0*   * 358 343   MCV 94 95 91   MCH 27.9 27.8 27.9   MCHC 29.8* 29.3* 30.8*       BNP:  Recent Labs   Lab 01/19/24  0614 01/22/24  0502 01/24/24  0409   BNP 1,278* 1,242* 963*       CMP:  Recent Labs   Lab 01/21/24  0557 01/22/24  0502 01/23/24  0506   * 81 74   CALCIUM 8.9 8.5* 8.3*   ALBUMIN 2.3* 2.1* 2.0*   PROT 7.7 6.7 6.6   * 137 138   K 5.0 5.0 4.7   CO2 20* 19* 18*    110 111*   BUN 59* 62* 60*   CREATININE 3.4* 3.3* 3.3*   ALKPHOS 133 115 107   ALT 26 21 18   AST 21 17 16   BILITOT 0.3 0.2 0.3        Coagulation:   Recent Labs   Lab 01/22/24  0502 01/23/24  0506 01/24/24  0409   INR 2.5* 2.2* 2.5*       LDH:  Recent Labs   Lab 01/22/24  0502 01/23/24  0506   * 292*       Microbiology:  Microbiology  Results (last 7 days)       Procedure Component Value Units Date/Time    AFB Culture & Smear [[id number]] Collected: 12/06/23 1532    Order Status: Completed Specimen: Body Fluid from Lung, Right Updated: 01/18/24 0928     AFB Culture & Smear Culture in progress      Culture in progress     AFB CULTURE STAIN No acid fast bacilli seen.    Narrative:      Bronchial Wash            I have reviewed all pertinent labs within the past 24 hours.    Estimated Creatinine Clearance: 25.3 mL/min (A) (based on SCr of 3.3 mg/dL (H)).    Diagnostic Results:  I have reviewed and interpreted all pertinent imaging results/findings within the past 24 hours.

## 2024-01-24 NOTE — PROGRESS NOTES
DISCHARGE NOTE:    Radha Abbott is a 63 y.o. male s/p HM3 on 12/1/23 this admit. His hospital course was complicated by prolonged ICU stay and IVÁN requiring temporary RRT. Patient will be transferred to rehab.     Pharmacy Interventions/Recommendations:  1) INR Goal: 2-3    2) Antiplatelet Agents: none     3) Heparin Bridging:  Yes    4) INR Follow-Up/Discharge Needs: Recommend repeat INR on Friday and 1-2 x weekly thereafter.      See list of discharge medication for dosing instructions.     Radha Abbott and his caregiver verbalized their understanding and had the opportunity to ask questions.      Discharge Medications:     Medication List        START taking these medications      acetaminophen 500 MG tablet  Commonly known as: TYLENOL  Take 2 tablets (1,000 mg total) by mouth every 8 (eight) hours as needed for Pain.     famotidine 20 MG tablet  Commonly known as: PEPCID  Take 1 tablet (20 mg total) by mouth once daily.     fluticasone propionate 50 mcg/actuation nasal spray  Commonly known as: FLONASE  2 sprays (100 mcg total) by Each Nostril route once daily.     hydrALAZINE 50 MG tablet  Commonly known as: APRESOLINE  Take 1 tablet (50 mg total) by mouth every 8 (eight) hours.     magnesium oxide 400 mg (241.3 mg magnesium) tablet  Commonly known as: MAG-OX  Take 1 tablet (400 mg total) by mouth once daily.     omega-3 acid ethyl esters 1 gram capsule  Commonly known as: LOVAZA  Take 2 capsules (2 g total) by mouth 2 (two) times daily.     traZODone 50 MG tablet  Commonly known as: DESYREL  Take 0.5 tablets (25 mg total) by mouth every evening.     venlafaxine 37.5 MG Tab  Commonly known as: EFFEXOR  Take 1 tablet (37.5 mg total) by mouth once daily.     vitamin D 1000 units Tab  Commonly known as: VITAMIN D3  Take 1 tablet (1,000 Units total) by mouth once daily.     warfarin 5 MG tablet  Commonly known as: COUMADIN  Take 0.5 tablets (2.5 mg total) by mouth every Mon, Wed, Fri, Sun AND 1 tablet (5 mg total)  every Tues, Thurs, Sat.            CHANGE how you take these medications      gabapentin 100 MG capsule  Commonly known as: NEURONTIN  Take 1 capsule (100 mg total) by mouth 2 (two) times daily.  What changed:   medication strength  how much to take  when to take this  reasons to take this     torsemide 40 mg Tab  Take 80 mg by mouth once daily.  What changed:   medication strength  how much to take  when to take this            CONTINUE taking these medications      amiodarone 400 MG tablet  Commonly known as: PACERONE  Take 1 tablet (400 mg total) by mouth once daily.            STOP taking these medications      aspirin 81 MG EC tablet  Commonly known as: ECOTRIN     atorvastatin 40 MG tablet  Commonly known as: LIPITOR     DOBUTamine 1,000 mg/250 mL (4,000 mcg/mL) infusion  Commonly known as: DOBUTREX     ELIQUIS 5 mg Tab  Generic drug: apixaban     fish oil-omega-3 fatty acids 300-1,000 mg capsule     furosemide 40 MG tablet  Commonly known as: LASIX     metOLazone 2.5 MG tablet  Commonly known as: ZAROXOLYN     multivitamin capsule     pantoprazole 40 MG tablet  Commonly known as: PROTONIX     potassium chloride SA 20 MEQ tablet  Commonly known as: K-DUR,KLOR-CON     sacubitriL-valsartan 24-26 mg per tablet  Commonly known as: ENTRESTO               Where to Get Your Medications        These medications were sent to [facility] [address] [facility] [id number]      Hours: Mon-Fri 7a-7p, Sat-Sun 10a-4p Phone: [phone]   famotidine 20 MG tablet  gabapentin 100 MG capsule       Information about where to get these medications is not yet available    Ask your nurse or doctor about these medications  acetaminophen 500 MG tablet  fluticasone propionate 50 mcg/actuation nasal spray  hydrALAZINE 50 MG tablet  magnesium oxide 400 mg (241.3 mg magnesium) tablet  omega-3 acid ethyl esters 1 gram capsule  torsemide 40 mg Tab  traZODone 50 MG tablet  venlafaxine 37.5 MG Tab  vitamin D 1000  units Tab  warfarin 5 MG tablet

## 2024-01-24 NOTE — PROGRESS NOTES
01/24/2024  [place] Duarte    Current provider:  Sajan Hurley, *    Device interrogation:      1/24/2024     4:57 AM 1/23/2024    11:53 PM 1/23/2024     8:37 PM 1/23/2024     4:10 PM 1/23/2024    12:54 PM 1/23/2024     7:20 AM 1/23/2024     4:47 AM   TXP LVAD INTERROGATIONS   Type HeartMate3 HeartMate3 HeartMate3 HeartMate3 HeartMate3 HeartMate3 HeartMate3   Flow 3.9 3.8 3.9 3.6 3.9 3.7 3.6   Speed 5000 5000 5000 5000 5000 5000 5000   PI 4.6 5.3 4.5 5.9 4.3 4.9 5   Power ([place]) 3.4 3.4 3.4 3.5 3.4 3.4 3.4   LSL 4600 4600 4600 4600 4600 4600    Pulsatility Intermittent pulse No Pulse Pulse Intermittent pulse Intermittent pulse Intermittent pulse Intermittent pulse          Rounded on [facility] to ensure all mechanical assist device settings (IABP or VAD) were appropriate and all parameters were within limits.  I was able to ensure all back up equipment was present, the staff had no issues, and the Perfusion Department daily rounding was complete.      For implantable VADs: Interrogation of Ventricular assist device was performed with analysis of device parameters and review of device function. I have personally reviewed the interrogation findings and agree with findings as stated.     In emergency, the nursing units have been notified to contact the perfusion department either by:  Calling [phone] from 630am to 4pm Mon thru Fri, utilizing the On-Call Finder functionality of Epic and searching for Perfusion, or by contacting the hospital  from 4pm to 630am and on weekends and asking to speak with the perfusionist on call.    10:48 AM

## 2024-01-24 NOTE — PROGRESS NOTES
Discharge Note:  CRISTAL advised of pt's dc to [facility] today. SW met with pt and his wife to discuss plans. Pt was sitting up on sofa and smiling.  Pt reported he is so happy to be going to rehab. Pt hoping to build endurance and strength and go home soon.  Support and encouragement given.  No needs reported at this time.

## 2024-01-24 NOTE — DISCHARGE SUMMARY
[facility] - Cardiology Stepdown  Heart Transplant  Discharge Summary      Patient Name: Radha Abbott  MRN: [id number]  Admission Date: 11/9/2023  Hospital Length of Stay: 76 days  Discharge Date and Time: 01/24/2024 2:01 PM  Attending Physician: Chiquita att. providers found   Discharging Provider: Erasto Park NP  Primary Care Provider: Vasu Kong MD     HPI: 63 year old male with hx of CAD s/p 3v CABG (unclear anatomy, 2009), ICM with a recent EF of 15-20% s/p ICD (medtronik 2009), DM2 (a1c 7.7), HTN, HLD, Vfib on amio who presents to the ED with CC of SOB. Pt was recently admitted to [facility] as a transfer.  He was started on a Lasix gtt and did well.  Started on gDMT and discharged home on  5 with plans to follow up in \[facility]\"" clinic for ongoing transplant evaluation at another facility.  He says that about a few days ago he started to notice LE swelling.  This turned into Nelson and orthopnea.  He says he can't walk to the bathroom without getting SOB.  He also complains of weight gain.  He takes torsemide 20mg BID at home and was told to trial additional Lasix which he did without any improvement.  He was rx metolazone but has not been filled.  He came to the ED     In the ED he was AF with stable VS on RA.  CBC showing chronic anemia.  CMP notable for acute on chronic CKD with baseline around 2.1 and Cr now 2.6.  BNP elevated.  Lactate neg.  CXR showing pulmonary edema.  I evaluated the pt at the bedside.  Bedside TTE showing CVP >15.  He was subsequently admitted to the CCU for diuresis. He was continued on his home  and was started on a lasix gtt, which he responded well to overnight (net -1700cc. He feels much better this morning as well. Our transplant coordinators have been working on insurance approval and he is now being transferred to \[facility]\"" service for transplant evaluation.     Procedure(s) (LRB):  INSERTION, CATHETER, RIGHT HEART (Right)     Hospital Course: Pt was admitted and medically optimized  while being worked up for LVAD/transplant. He was deemed eligible candidate and HM3 was placed by Dr Washington on 12/1/23. Lengthy post op course complicated by vasoplegia(requiring Giaprezza), debility, malnutrition/impaired swallowing, and renal failure requiring HD(since weaned off). Once medically stable, he was transferred to Rehab for further PT/OT/ST. Of note, pt left with permacath still in place. Will need to be removed if he remains off dialysis. Diet at DC was regular with honey thickened liquids. He will return to VAD clinic as scheduled and f/u with ENT for consideration of vocal cord injection.       Consults (From admission, onward)          Status Ordering Provider     Inpatient consult to Physical Medicine Rehab  Once        Provider:  (Not yet assigned)    Completed LALO ANYA VClau     [facility]  Once        Provider:  (Not yet assigned)    Completed LALO ANYA V.     Inpatient consult to Registered Dietitian/Nutritionist  Once        Provider:  (Not yet assigned)    Completed JOSEFINA WINSLOW     Inpatient consult to Psychiatry  Once        Provider:  (Not yet assigned)    Completed ASIM BERMAN     Inpatient consult to Interventional Radiology  Once        Provider:  (Not yet assigned)    Completed JOSEFINA WINSLOW     Inpatient consult to Interventional Radiology  Once        Provider:  (Not yet assigned)    Completed JOSEFINA WINSLOW     Inpatient consult to ENT  Once        Provider:  (Not yet assigned)    Completed JOSEFINA WINSLOW     Inpatient consult to Infectious Diseases  Once        Provider:  Albert Burt MD    Completed MARTINEZ DOOLEY     Inpatient consult to Psychology  Once        Provider:  (Not yet assigned)    Completed DELVIN ALEJANDRE     Inpatient consult to Psychiatry  Once        Provider:  (Not yet assigned)    Completed CAROLINA CORONA     Inpatient consult to Neurology  Once        Provider:  (Not yet assigned)    Completed  CHLOE, SANAA     Inpatient consult to Infectious Diseases  Once        Provider:  (Not yet assigned)    Completed CHLOE, SANAA     Inpatient consult to Nephrology  Once        Provider:  (Not yet assigned)    Completed TEDDY BERG     Inpatient consult to PICC team ([facility])  Once        Provider:  (Not yet assigned)    Completed CHLOE, SANAA     Inpatient consult to Registered Dietitian/Nutritionist  Once        Provider:  (Not yet assigned)    Completed SHANICE RANKIN     Inpatient consult to Interventional Cardiology  Once        Provider:  (Not yet assigned)    Completed REBEKAH ZAZUETA     Inpatient consult to Palliative Care  Once        Provider:  (Not yet assigned)    Completed ANYA MAY     Inpatient consult to Infectious Diseases  Once        Provider:  (Not yet assigned)    Completed REBEKAH ZAZUETA     Inpatient consult to Registered Dietitian/Nutritionist  Once        Provider:  (Not yet assigned)    Completed ASIM BERMAN     Inpatient consult to Interventional Cardiology  Once        Provider:  (Not yet assigned)    Completed ASIM BERMAN     Inpatient consult to Endocrinology  Once        Provider:  (Not yet assigned)    Completed ASIM BERMAN            Significant Diagnostic Studies: N/A    Pending Diagnostic Studies:       Procedure Component Value Units Date/Time    Factor 8 assay [[id number]] Collected: 11/15/23 1601    Order Status: Sent Lab Status: In process Updated: 11/15/23 1603    Specimen: Blood     Fibrinogen [[id number]] Collected: 12/05/23 0406    Order Status: Sent Lab Status: In process Updated: 12/05/23 0407    Specimen: Blood     Magnesium [[id number]] Collected: 12/09/23 0300    Order Status: Sent Lab Status: In process Updated: 12/09/23 0729    Specimen: Blood     Magnesium [[id number]] Collected: 12/09/23 0300    Order Status: Sent Lab Status: In process Updated: 12/09/23 0729    Specimen: Blood     Potassium [[id number]] Collected: 12/09/23 0300    Order Status: Sent Lab  Status: In process Updated: 12/09/23 0729    Specimen: Blood           Final Active Diagnoses:    Diagnosis Date Noted POA    PRINCIPAL PROBLEM:  LVAD (left ventricular assist device) present [Z95.811] 12/01/2023 Not Applicable    Moderate malnutrition [E44.0] 12/14/2023 Yes    Acute renal failure with acute tubular necrosis superimposed on stage 3b chronic kidney disease [N17.0, N18.32] 10/08/2023 Yes    Anemia due to chronic kidney disease, on chronic dialysis [N18.6, D63.1, Z99.2] 01/22/2024 Not Applicable    Hyperkalemia [E87.5] 01/19/2024 Yes    Impaired mobility [Z74.09] 01/18/2024 Unknown    Right parotid mass [K11.8] 01/06/2024 Yes    Lymphadenopathy [R59.1] 01/05/2024 No    At high risk for skin breakdown [Z91.89] 01/05/2024 Unknown    Adjustment disorder with depressed mood [F43.21] 01/02/2024 Yes    Deep tissue injury [T14.8XXA] 01/02/2024 No    Blood blister [T14.8XXA] 01/02/2024 No    Acute renal failure on dialysis [N17.9, Z99.2] 12/20/2023 Not Applicable    Acute kidney injury superimposed on chronic kidney disease [N17.9, N18.9] 12/20/2023 No    Shortness of breath [R06.02] 12/18/2023 Unknown    Acute renal failure with tubular necrosis [N17.0] 12/18/2023 No    Dysphonia [R49.0] 12/14/2023 No    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] 12/14/2023 No    Oliguria [R34] 12/14/2023 No    Depressed mood [R45.89] 12/12/2023 No    Septic shock [A41.9, R65.21] 12/11/2023 Unknown    IVÁN (acute kidney injury) [N17.9] 12/09/2023 Yes    Congestive heart failure [I50.9] 12/09/2023 Yes    Abnormal CXR [R93.89] 12/07/2023 Unknown    Acute encephalopathy [G93.40] 12/07/2023 Unknown    Pre-transplant evaluation for heart transplant [Z01.818] 11/17/2023 Not Applicable    Palliative care encounter [Z51.5] 11/17/2023 Not Applicable    Advance care planning [Z71.89] 11/17/2023 Not Applicable    Acute on chronic combined systolic and diastolic CHF, NYHA class 4 [I50.43] 10/17/2023 Yes    PAF (paroxysmal atrial  fibrillation) [I48.0] 10/11/2023 Yes    Ventricular tachycardia [I47.20] 10/07/2023 Yes    Type 2 diabetes mellitus without complication, without long-term current use of insulin [E11.9] 10/07/2023 Yes    CAD (coronary artery disease) [I25.10] 10/07/2023 Yes      Problems Resolved During this Admission:    Diagnosis Date Noted Date Resolved POA    Right foot pain [M79.671] 12/20/2023 12/28/2023 No    Cachexia [R64] 12/18/2023 12/31/2023 Yes    Leukocytosis [D72.829] 12/13/2023 12/29/2023 No    Adjustment disorder with anxiety [F43.22] 12/12/2023 12/30/2023 Yes    Altered mental status [R41.82] 12/06/2023 12/29/2023 No    Atrial fibrillation with tachycardic ventricular rate [I48.91] 10/15/2023 11/12/2023 Yes      Discharged Condition: stable    Disposition: Skilled Nursing Facility    Follow Up:    Patient Instructions:      Ambulatory referral/consult to Anticoagulation Monitoring (PHARMACIST MANAGED)   Standing Status: Future   Referral Priority: Routine Referral Type: Consultation   Referral Reason: Specialty Services Required   Requested Specialty: Cardiology   Number of Visits Requested: 1     Medications:  Reconciled Home Medications:      Medication List        START taking these medications      acetaminophen 500 MG tablet  Commonly known as: TYLENOL  Take 2 tablets (1,000 mg total) by mouth every 8 (eight) hours as needed for Pain.     famotidine 20 MG tablet  Commonly known as: PEPCID  Take 1 tablet (20 mg total) by mouth once daily.     fluticasone propionate 50 mcg/actuation nasal spray  Commonly known as: FLONASE  2 sprays (100 mcg total) by Each Nostril route once daily.     hydrALAZINE 50 MG tablet  Commonly known as: APRESOLINE  Take 1 tablet (50 mg total) by mouth every 8 (eight) hours.     magnesium oxide 400 mg (241.3 mg magnesium) tablet  Commonly known as: MAG-OX  Take 1 tablet (400 mg total) by mouth once daily.     omega-3 acid ethyl esters 1 gram capsule  Commonly known as: LOVAZA  Take 2  capsules (2 g total) by mouth 2 (two) times daily.     traZODone 50 MG tablet  Commonly known as: DESYREL  Take 0.5 tablets (25 mg total) by mouth every evening.     venlafaxine 37.5 MG Tab  Commonly known as: EFFEXOR  Take 1 tablet (37.5 mg total) by mouth once daily.     vitamin D 1000 units Tab  Commonly known as: VITAMIN D3  Take 1 tablet (1,000 Units total) by mouth once daily.     warfarin 5 MG tablet  Commonly known as: COUMADIN  Take 0.5 tablets (2.5 mg total) by mouth every Mon, Wed, Fri, Sun AND 1 tablet (5 mg total) every Tues, Thurs, Sat.            CHANGE how you take these medications      gabapentin 100 MG capsule  Commonly known as: NEURONTIN  Take 1 capsule (100 mg total) by mouth 2 (two) times daily.  What changed:   medication strength  how much to take  when to take this  reasons to take this     torsemide 40 mg Tab  Take 80 mg by mouth once daily.  What changed:   medication strength  how much to take  when to take this            CONTINUE taking these medications      amiodarone 400 MG tablet  Commonly known as: PACERONE  Take 1 tablet (400 mg total) by mouth once daily.            STOP taking these medications      aspirin 81 MG EC tablet  Commonly known as: ECOTRIN     atorvastatin 40 MG tablet  Commonly known as: LIPITOR     DOBUTamine 1,000 mg/250 mL (4,000 mcg/mL) infusion  Commonly known as: DOBUTREX     ELIQUIS 5 mg Tab  Generic drug: apixaban     fish oil-omega-3 fatty acids 300-1,000 mg capsule     furosemide 40 MG tablet  Commonly known as: LASIX     metOLazone 2.5 MG tablet  Commonly known as: ZAROXOLYN     multivitamin capsule     pantoprazole 40 MG tablet  Commonly known as: PROTONIX     potassium chloride SA 20 MEQ tablet  Commonly known as: K-AGNELIKA,KLOR-CON     sacubitriL-valsartan 24-26 mg per tablet  Commonly known as: THEO Park NP  Heart Transplant  [facility] - Cardiology Stepdown

## 2024-01-24 NOTE — PROGRESS NOTES
01/23/24 2353   Vital Signs   BP (!) 92/0   BP Location Left arm   BP Method Doppler   Patient Position Lying     Pt's doppler elevated, denies any symptoms, PRN IV hydralazine given. Will recheck BP after 1 hr.

## 2024-01-24 NOTE — ASSESSMENT & PLAN NOTE
-HeartMate 3 Implanted  12/1/2023  as DT  -HTS Primary  -Implanted by Dr. Washington  -Cont Coumadin, dosing by PharmD.  Goal INR 2.0-3.0 . Therapeutic today  -Antiplatelets Not on ASA  -LDH is stable overall today. Will continue to monitor daily.  -Weaned off midodrine.  -Speed set at  4900   rpm, LSL 4500 rpm   -Interrogation notable for no events  -ECHO 1/2/24 mild TR, LVEDD 6.25 with HM3 set to 4900 rpm   -RHC 1/22/24 with RA 13, PCWP 22, PA 50/25, CO 5.8, CI 3.0   -Not listed for OHTx, declined for OHTX due to comorbidities   -PT/OT/Speech. Recommending rehab.      Procedure: Device Interrogation Including analysis of device parameters  Current Settings: Ventricular Assist Device  Review of device function is stable/unstable stable        1/24/2024     4:57 AM 1/23/2024    11:53 PM 1/23/2024     8:37 PM 1/23/2024     4:10 PM 1/23/2024    12:54 PM 1/23/2024     7:20 AM 1/23/2024     4:47 AM   TXP LVAD INTERROGATIONS   Type HeartMate3 HeartMate3 HeartMate3 HeartMate3 HeartMate3 HeartMate3 HeartMate3   Flow 3.9 3.8 3.9 3.6 3.9 3.7 3.6   Speed 5000 5000 5000 5000 5000 5000 5000   PI 4.6 5.3 4.5 5.9 4.3 4.9 5   Power ([place]) 3.4 3.4 3.4 3.5 3.4 3.4 3.4   LSL 4600 4600 4600 4600 4600 4600    Pulsatility Intermittent pulse No Pulse Pulse Intermittent pulse Intermittent pulse Intermittent pulse Intermittent pulse

## 2024-01-24 NOTE — PLAN OF CARE
Pt free of falls and injury. Pt denies any pain or discomfort. Pt AAOx4. Fall precautions remain in place. LVAD hum present and smooth. VAD numbers WDL. Doppler number elevated but controled with PRN hydralazine. DLES dressing CDI. Plan of care reviewed with pt. Will continue to monitor pt.

## 2024-01-24 NOTE — PROGRESS NOTES
Renal function continues to improved. sCr down to 3.1 from 3.3. Other electrolytes stable.   Diuretic regimen per primary team.   Will continue to follow from AFAR.     XIAO Hinton DNP, APRN, FNP-C  Department of Nephrology  [facility]  Pager: [phone]

## 2024-01-24 NOTE — PLAN OF CARE
[facility]              Heart Transplant/VAD Clinic   [address] [place] [address]              [phone] after hours          [phone] fax  Hwy - Cardiology Stepdown  Facility Transfer Orders         Patient Name: Radha Abbott  YOB: 1960    01/24/2024    Admit to:  Rehab    Diagnoses:  Active Hospital Problems    Diagnosis  POA    *LVAD (left ventricular assist device) present [Z95.811]  Not Applicable     Priority: 1 - High    Moderate malnutrition [E44.0]  Yes     Priority: 2     Acute renal failure with acute tubular necrosis superimposed on stage 3b chronic kidney disease [N17.0, N18.32]  Yes     Priority: 2     Anemia due to chronic kidney disease, on chronic dialysis [N18.6, D63.1, Z99.2]  Not Applicable    Hyperkalemia [E87.5]  Yes    Impaired mobility [Z74.09]  Unknown    Right parotid mass [K11.8]  Yes    Lymphadenopathy [R59.1]  No    At high risk for skin breakdown [Z91.89]  Unknown    Adjustment disorder with depressed mood [F43.21]  Yes    Deep tissue injury [T14.8XXA]  No    Blood blister [T14.8XXA]  No    Acute renal failure on dialysis [N17.9, Z99.2]  Not Applicable    Acute kidney injury superimposed on chronic kidney disease [N17.9, N18.9]  No    Shortness of breath [R06.02]  Unknown    Acute renal failure with tubular necrosis [N17.0]  No    Dysphonia [R49.0]  No    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]  No    Oliguria [R34]  No    Depressed mood [R45.89]  No    Septic shock [A41.9, R65.21]  Unknown    IVÁN (acute kidney injury) [N17.9]  Yes    Congestive heart failure [I50.9]  Yes    Abnormal CXR [R93.89]  Unknown    Acute encephalopathy [G93.40]  Unknown    Pre-transplant evaluation for heart transplant [Z01.818]  Not Applicable    Palliative care encounter [Z51.5]  Not Applicable    Advance care planning [Z71.89]  Not Applicable    Acute on chronic combined systolic and diastolic CHF, NYHA class 4  [I50.43]  Yes    PAF (paroxysmal atrial fibrillation) [I48.0]  Yes    Ventricular tachycardia [I47.20]  Yes     Admit to ICU  Amiodarone infusion  Cardiology consult  Investigate defibrillator - EP study ?      Type 2 diabetes mellitus without complication, without long-term current use of insulin [E11.9]  Yes     Resume home meds  SSI      CAD (coronary artery disease) [I25.10]  Yes     Continue home medications          Resolved Hospital Problems    Diagnosis Date Resolved POA    Right foot pain [M79.671] 12/28/2023 No    Cachexia [R64] 12/31/2023 Yes    Leukocytosis [D72.829] 12/29/2023 No    Adjustment disorder with anxiety [F43.22] 12/30/2023 Yes    Altered mental status [R41.82] 12/29/2023 No    Atrial fibrillation with tachycardic ventricular rate [I48.91] 11/12/2023 Yes       Allergies:Review of patient's allergies indicates:  No Known Allergies    Vitals: Routine     Diet: Regular diet, Honey thickened liquids(silent aspirator). Aspiration precautions.     Activity: Activity as tolerated. No Swimming, bathing, vacuuming, contact sports.    Fresh implants= Sternal/Fall Precautions    Bed/Surface: Pressure Redistribution    Nursing:   SN to complete comprehensive assessment including routine vital signs. Instruct on disease process and s/s of complications to report to MD. Review/verify medication list sent home with the patient at time of discharge  and instruct patient/caregiver as needed. Frequency may be adjusted depending on start of care date.    **LVAD driveline exit site dressing change is to be completed per LVAD patient/caregiver only**.    **For questions or concerns, please call [phone] and ask for Pre-Heart transplant clinic, M-F 8-5. After hours, weekends, call [phone] and ask for the Heart Transplant Cardiologist on call.**    Central line care:  Scrub the Hub: Prior to accessing the line, always perform a 30 second alcohol scrub  Each lumen of the central line is to be flushed at  least daily with 10 mL Normal Saline and 3 mL Heparin flush (100 units/mL)  Skilled Nurse (SN) may draw blood from IV access  Blood Draw Procedure:   - Aspirate at least 5 mL of blood   - Discard   - Obtain specimen   - Change posiflow cap   - Flush with 20 mL Normal Saline followed by a                 3-5 mL Heparin flush (100 units/mL)  Central :   - Sterile dressing changes are done weekly and as needed.   - Use chlor-hexadine scrub to cleanse site, apply Biopatch to insertion site,       apply securement device dressing   - Posi-flow caps are changed weekly and after EVERY lab draw.   - If sterile gauze is under dressing to control oozing,                 dressing change must be performed every 24 hours until gauze is not needed.    Labs:   Weekly BMP, BNP, CBC, Magnesium, LDH  INR per routine to maintain goal level INR(2-3)    CONSULTS: PT to evaluate and treat- 7 times a week. OT to evaluate and treat 7 times per week.     Physical Therapy to evaluate and treat     Occupational Therapy to evaluate and treat     Speech Therapy  to evaluate and treat       Medications:          Medication List        START taking these medications      acetaminophen 500 MG tablet  Commonly known as: TYLENOL  Take 2 tablets (1,000 mg total) by mouth every 8 (eight) hours as needed for Pain.     famotidine 20 MG tablet  Commonly known as: PEPCID  Take 1 tablet (20 mg total) by mouth once daily.     fluticasone propionate 50 mcg/actuation nasal spray  Commonly known as: FLONASE  2 sprays (100 mcg total) by Each Nostril route once daily.     hydrALAZINE 50 MG tablet  Commonly known as: APRESOLINE  Take 1 tablet (50 mg total) by mouth every 8 (eight) hours.     magnesium oxide 400 mg (241.3 mg magnesium) tablet  Commonly known as: MAG-OX  Take 1 tablet (400 mg total) by mouth once daily.     omega-3 acid ethyl esters 1 gram capsule  Commonly known as: LOVAZA  Take 2 capsules (2 g total) by mouth 2 (two) times  daily.     traZODone 50 MG tablet  Commonly known as: DESYREL  Take 0.5 tablets (25 mg total) by mouth every evening.     venlafaxine 37.5 MG Tab  Commonly known as: EFFEXOR  Take 1 tablet (37.5 mg total) by mouth once daily.     vitamin D 1000 units Tab  Commonly known as: VITAMIN D3  Take 1 tablet (1,000 Units total) by mouth once daily.     warfarin 5 MG tablet  Commonly known as: COUMADIN  Take 0.5 tablets (2.5 mg total) by mouth every Mon, Wed, Fri, Sun AND 1 tablet (5 mg total) every Tues, Thurs, Sat.            CHANGE how you take these medications      gabapentin 100 MG capsule  Commonly known as: NEURONTIN  Take 1 capsule (100 mg total) by mouth 2 (two) times daily.  What changed:   medication strength  how much to take  when to take this  reasons to take this     torsemide 40 mg Tab  Take 80 mg by mouth once daily.  What changed:   medication strength  how much to take  when to take this            CONTINUE taking these medications      amiodarone 400 MG tablet  Commonly known as: PACERONE  Take 1 tablet (400 mg total) by mouth once daily.            STOP taking these medications      aspirin 81 MG EC tablet  Commonly known as: ECOTRIN     atorvastatin 40 MG tablet  Commonly known as: LIPITOR     DOBUTamine 1,000 mg/250 mL (4,000 mcg/mL) infusion  Commonly known as: DOBUTREX     ELIQUIS 5 mg Tab  Generic drug: apixaban     fish oil-omega-3 fatty acids 300-1,000 mg capsule     furosemide 40 MG tablet  Commonly known as: LASIX     metOLazone 2.5 MG tablet  Commonly known as: ZAROXOLYN     multivitamin capsule     pantoprazole 40 MG tablet  Commonly known as: PROTONIX     potassium chloride SA 20 MEQ tablet  Commonly known as: K-DUR,KLOR-CON     sacubitriL-valsartan 24-26 mg per tablet  Commonly known as: ENTRESTO               Where to Get Your Medications        These medications were sent to [facility]  [address], [place] [address]      Hours: Mon-Fri 7a-7p, Sat-Sun 10a-4p  Phone: [phone]   famotidine 20 MG tablet  gabapentin 100 MG capsule       Information about where to get these medications is not yet available    Ask your nurse or doctor about these medications  acetaminophen 500 MG tablet  fluticasone propionate 50 mcg/actuation nasal spray  hydrALAZINE 50 MG tablet  magnesium oxide 400 mg (241.3 mg magnesium) tablet  omega-3 acid ethyl esters 1 gram capsule  torsemide 40 mg Tab  traZODone 50 MG tablet  venlafaxine 37.5 MG Tab  vitamin D 1000 units Tab  warfarin 5 MG tablet       Send follow up questions to VAD clinic MD [phone] or [phone].

## 2024-01-25 ENCOUNTER — TELEPHONE (OUTPATIENT)
Dept: TRANSPLANT | Facility: CLINIC | Age: 64
End: 2024-01-25
Payer: MEDICAID

## 2024-01-25 NOTE — TELEPHONE ENCOUNTER
Attended rehab multidisciplinary rounds. No updates on patient at this time. Asked about expected discharge date and they stated that they will know more Tuesday.

## 2024-01-29 ENCOUNTER — TELEPHONE (OUTPATIENT)
Dept: TRANSPLANT | Facility: CLINIC | Age: 64
End: 2024-01-29
Payer: MEDICAID

## 2024-01-29 NOTE — TELEPHONE ENCOUNTER
Attended rehab multidisciplinary rounds. Estimated discharge date is Wednesday, February 7. It was noted that patient weight has increased from 165lb on admit to 171lb today. Will discuss with MD.

## 2024-01-30 ENCOUNTER — TELEPHONE (OUTPATIENT)
Dept: TRANSPLANT | Facility: CLINIC | Age: 64
End: 2024-01-30
Payer: MEDICAID

## 2024-01-30 DIAGNOSIS — Z98.890 POST-OPERATIVE STATE: ICD-10-CM

## 2024-01-30 DIAGNOSIS — Z95.811 LVAD (LEFT VENTRICULAR ASSIST DEVICE) PRESENT: Primary | ICD-10-CM

## 2024-01-30 NOTE — TELEPHONE ENCOUNTER
"Radha Abbott discharged status post VAD implant 12/1/23 (date).  Follow up phone call to patient/caregiver to discuss their implant/index hospitalization. Explained that we are looking to make improvements on our VAD program. Explained the scale of the survey; Strongly Agree, Agree, Neutral, Disagree, Strongly Disagree    I met with a VAD Coordinator prior to surgery who helped me understand what to expect:  Agree  My team (physicians, surgeons, nurses, coordinators, therapists, social workers, etc.) updated me and my family throughout my stay.  Agree    I felt prepared to go home with my VAD including what to do in the event of an alarm and who to call if there is an emergency.  Agree    I received quality care from my care team.  Agree    Additional comments: y'all were great"      Thanked patient/cargiver for their honest feedback.    Confirmed that patient made it home safely with their equipment and medications.  Reminded patient/caregiver to page if they have an emergency and that we look forward to seeing them at their first clinic visit.        "

## 2024-02-01 ENCOUNTER — DOCUMENTATION ONLY (OUTPATIENT)
Dept: CARDIOTHORACIC SURGERY | Facility: CLINIC | Age: 64
End: 2024-02-01

## 2024-02-01 ENCOUNTER — CLINICAL SUPPORT (OUTPATIENT)
Dept: TRANSPLANT | Facility: CLINIC | Age: 64
End: 2024-02-01
Payer: MEDICAID

## 2024-02-01 ENCOUNTER — OFFICE VISIT (OUTPATIENT)
Dept: CARDIOTHORACIC SURGERY | Facility: CLINIC | Age: 64
End: 2024-02-01
Attending: THORACIC SURGERY (CARDIOTHORACIC VASCULAR SURGERY)
Payer: MEDICAID

## 2024-02-01 ENCOUNTER — HOSPITAL ENCOUNTER (OUTPATIENT)
Dept: RADIOLOGY | Facility: HOSPITAL | Age: 64
Discharge: HOME | End: 2024-02-01
Attending: THORACIC SURGERY (CARDIOTHORACIC VASCULAR SURGERY)
Payer: MEDICAID

## 2024-02-01 ENCOUNTER — OFFICE VISIT (OUTPATIENT)
Dept: TRANSPLANT | Facility: CLINIC | Age: 64
End: 2024-02-01
Payer: MEDICAID

## 2024-02-01 VITALS — SYSTOLIC BLOOD PRESSURE: 94 MMHG | WEIGHT: 169 LBS | HEIGHT: 72 IN | BODY MASS INDEX: 22.89 KG/M2 | TEMPERATURE: 98 F

## 2024-02-01 DIAGNOSIS — N18.32 ACUTE RENAL FAILURE WITH ACUTE TUBULAR NECROSIS SUPERIMPOSED ON STAGE 3B CHRONIC KIDNEY DISEASE: Primary | ICD-10-CM

## 2024-02-01 DIAGNOSIS — Z95.811 LVAD (LEFT VENTRICULAR ASSIST DEVICE) PRESENT: Primary | ICD-10-CM

## 2024-02-01 DIAGNOSIS — I50.42 CHRONIC COMBINED SYSTOLIC AND DIASTOLIC HEART FAILURE: ICD-10-CM

## 2024-02-01 DIAGNOSIS — Z95.811 LVAD (LEFT VENTRICULAR ASSIST DEVICE) PRESENT: ICD-10-CM

## 2024-02-01 DIAGNOSIS — N17.0 ACUTE RENAL FAILURE WITH ACUTE TUBULAR NECROSIS SUPERIMPOSED ON STAGE 3B CHRONIC KIDNEY DISEASE: Primary | ICD-10-CM

## 2024-02-01 DIAGNOSIS — I25.10 CORONARY ARTERY DISEASE INVOLVING NATIVE CORONARY ARTERY OF NATIVE HEART WITHOUT ANGINA PECTORIS: ICD-10-CM

## 2024-02-01 DIAGNOSIS — E11.9 TYPE 2 DIABETES MELLITUS WITHOUT COMPLICATION, WITHOUT LONG-TERM CURRENT USE OF INSULIN: ICD-10-CM

## 2024-02-01 DIAGNOSIS — Z98.890 POST-OPERATIVE STATE: ICD-10-CM

## 2024-02-01 DIAGNOSIS — I47.20 VENTRICULAR TACHYCARDIA: ICD-10-CM

## 2024-02-01 DIAGNOSIS — Z95.811 PRESENCE OF VENTRICULAR ASSIST DEVICE: Primary | ICD-10-CM

## 2024-02-01 PROCEDURE — 3044F HG A1C LEVEL LT 7.0%: CPT | Mod: CPTII,,, | Performed by: INTERNAL MEDICINE

## 2024-02-01 PROCEDURE — 93750 INTERROGATION VAD IN PERSON: CPT | Mod: S$PBB,,, | Performed by: INTERNAL MEDICINE

## 2024-02-01 PROCEDURE — 99213 OFFICE O/P EST LOW 20 MIN: CPT | Mod: PBBFAC,25,27 | Performed by: INTERNAL MEDICINE

## 2024-02-01 PROCEDURE — 1111F DSCHRG MED/CURRENT MED MERGE: CPT | Mod: CPTII,,, | Performed by: INTERNAL MEDICINE

## 2024-02-01 PROCEDURE — 3044F HG A1C LEVEL LT 7.0%: CPT | Mod: CPTII,,, | Performed by: NURSE PRACTITIONER

## 2024-02-01 PROCEDURE — 99215 OFFICE O/P EST HI 40 MIN: CPT | Mod: S$PBB,,, | Performed by: INTERNAL MEDICINE

## 2024-02-01 PROCEDURE — 99999 PR PBB SHADOW E&M-EST. PATIENT-LVL II: CPT | Mod: PBBFAC,,,

## 2024-02-01 PROCEDURE — 99999PBSHW PR PBB SHADOW TECHNICAL ONLY FILED TO HB: Mod: PBBFAC,,,

## 2024-02-01 PROCEDURE — 99024 POSTOP FOLLOW-UP VISIT: CPT | Mod: ,,, | Performed by: NURSE PRACTITIONER

## 2024-02-01 PROCEDURE — 99211 OFF/OP EST MAY X REQ PHY/QHP: CPT | Mod: PBBFAC,25 | Performed by: NURSE PRACTITIONER

## 2024-02-01 PROCEDURE — 71046 X-RAY EXAM CHEST 2 VIEWS: CPT | Mod: 26,,, | Performed by: RADIOLOGY

## 2024-02-01 PROCEDURE — 99999 PR PBB SHADOW E&M-EST. PATIENT-LVL III: CPT | Mod: PBBFAC,,, | Performed by: INTERNAL MEDICINE

## 2024-02-01 PROCEDURE — 99212 OFFICE O/P EST SF 10 MIN: CPT | Mod: PBBFAC,25,27

## 2024-02-01 PROCEDURE — 1160F RVW MEDS BY RX/DR IN RCRD: CPT | Mod: CPTII,,, | Performed by: INTERNAL MEDICINE

## 2024-02-01 PROCEDURE — 1159F MED LIST DOCD IN RCRD: CPT | Mod: CPTII,,, | Performed by: INTERNAL MEDICINE

## 2024-02-01 PROCEDURE — 71046 X-RAY EXAM CHEST 2 VIEWS: CPT | Mod: TC

## 2024-02-01 PROCEDURE — 99999 PR PBB SHADOW E&M-EST. PATIENT-LVL I: CPT | Mod: PBBFAC,,, | Performed by: NURSE PRACTITIONER

## 2024-02-01 PROCEDURE — 3008F BODY MASS INDEX DOCD: CPT | Mod: CPTII,,, | Performed by: INTERNAL MEDICINE

## 2024-02-01 RX ORDER — SODIUM BICARBONATE 325 MG/1
325 TABLET ORAL 3 TIMES DAILY
COMMUNITY
End: 2024-02-12 | Stop reason: SDUPTHER

## 2024-02-01 RX ORDER — AMOXICILLIN 250 MG
2 CAPSULE ORAL DAILY PRN
COMMUNITY
End: 2024-02-12 | Stop reason: SDUPTHER

## 2024-02-01 RX ORDER — TORSEMIDE 20 MG/1
80 TABLET ORAL 2 TIMES DAILY
COMMUNITY
End: 2024-02-01

## 2024-02-01 RX ORDER — CHOLECALCIFEROL (VITAMIN D3) 25 MCG
1000 TABLET ORAL DAILY
Qty: 90 TABLET | Refills: 3 | Status: ON HOLD | OUTPATIENT
Start: 2024-02-01

## 2024-02-01 RX ORDER — FAMOTIDINE 20 MG/1
20 TABLET, FILM COATED ORAL DAILY
Qty: 90 TABLET | Refills: 3 | Status: ON HOLD | OUTPATIENT
Start: 2024-02-01 | End: 2025-01-31

## 2024-02-01 RX ORDER — AMIODARONE HYDROCHLORIDE 400 MG/1
400 TABLET ORAL DAILY
Qty: 90 TABLET | Refills: 3 | Status: ON HOLD | OUTPATIENT
Start: 2024-02-01 | End: 2025-01-31

## 2024-02-01 RX ORDER — TRAZODONE HYDROCHLORIDE 50 MG/1
25 TABLET ORAL NIGHTLY
Qty: 45 TABLET | Refills: 3 | Status: ON HOLD | OUTPATIENT
Start: 2024-02-01 | End: 2025-01-31

## 2024-02-01 RX ORDER — HYDRALAZINE HYDROCHLORIDE 50 MG/1
50 TABLET, FILM COATED ORAL EVERY 8 HOURS
Qty: 270 TABLET | Refills: 3 | Status: ON HOLD | OUTPATIENT
Start: 2024-02-01 | End: 2024-03-27 | Stop reason: HOSPADM

## 2024-02-01 RX ORDER — OMEGA-3-ACID ETHYL ESTERS 1 G/1
2 CAPSULE, LIQUID FILLED ORAL 2 TIMES DAILY
Qty: 360 CAPSULE | Refills: 3 | Status: ON HOLD | OUTPATIENT
Start: 2024-02-01 | End: 2025-01-31

## 2024-02-01 RX ORDER — POLYETHYLENE GLYCOL 3350 17 G/17G
17 POWDER, FOR SOLUTION ORAL
Status: ON HOLD | COMMUNITY
End: 2024-03-27 | Stop reason: HOSPADM

## 2024-02-01 RX ORDER — TRAMADOL HYDROCHLORIDE 50 MG/1
50 TABLET ORAL EVERY 12 HOURS PRN
Status: ON HOLD | COMMUNITY
End: 2024-03-27 | Stop reason: HOSPADM

## 2024-02-01 RX ORDER — VENLAFAXINE 37.5 MG/1
37.5 TABLET ORAL DAILY
Qty: 90 TABLET | Refills: 3 | Status: ON HOLD | OUTPATIENT
Start: 2024-02-01 | End: 2025-01-31

## 2024-02-01 NOTE — PROGRESS NOTES
"Subjective:   Patient ID:  Radha Abbott is a 63 y.o. male who presents for LVAD followup visit.    Implant Date:12/1/23  Initials:LxH     Heartmate 3 RPM 5000     INR goal: 2-3   Bridge with Heparin   Antiplatelets: None d/t low H/H on implant         2/1/2024    12:11 PM 1/24/2024     8:00 AM 1/24/2024     4:57 AM 1/23/2024    11:53 PM 1/23/2024     8:37 PM 1/23/2024     4:10 PM 1/23/2024    12:54 PM   TXP PINA INTERROGATIONS   Type HeartMate3         Flow 3.7         Speed 5000         PI 6.7         Power (Carrington) 3.4         LSL 4600         Pulsatility No Pulse No Pulse Intermittent pulse No Pulse Pulse Intermittent pulse Intermittent pulse       HPI  Mr. Radha Abbott is a very pleasant 64 yo male with stage D HFrEF, ICMP who was recently discharged from our service after being treated for ADHF/CS. He did undergo HM3 placement placed by Dr Washington on 12/1/23. Lengthy post op course complicated by vasoplegia(requiring Giaprezza), debility, malnutrition/impaired swallowing, and renal failure requiring HD(since weaned off). Once medically stable, he was transferred to Rehab for further PT/OT/ST. Of note, pt left with permacath still in place. He has done really well in rehab and today was discharged from rehab. Today is his 1st clinic visit since hospital discharge. Reports feeling well. Has gained some weight (but suspect this healthy weight gain as his appetite has improved). VAD speed is at 5000 rpm. No VAD alarms noted on interrogation occasional PI events . BP is 94 mm of Hg. DLES is a "1". INR is therapeutic at 2.0. LDh is at baseline. Of note, there was some confusion about his diuretic dose at discharge. He was discharged from our service to rehab on Torsemide 80 mg daily but has been receiving 80 mg twice daily during his Rehab stay. Today his creat is up (bumped from 3.1 to 3.5). he states that he is diuresing very well. NYHA class II symptoms.     Review of Systems   Constitutional: Negative. Negative for " "chills, decreased appetite, diaphoresis, fever, malaise/fatigue, night sweats, weight gain and weight loss.   Eyes: Negative.    Cardiovascular:  Negative for chest pain, claudication, cyanosis, dyspnea on exertion, irregular heartbeat, leg swelling, near-syncope, orthopnea, palpitations, paroxysmal nocturnal dyspnea and syncope.   Respiratory:  Negative for cough, hemoptysis and shortness of breath.    Endocrine: Negative.    Hematologic/Lymphatic: Negative.    Skin:  Negative for color change, dry skin and nail changes.   Musculoskeletal: Negative.    Gastrointestinal: Negative.    Genitourinary: Negative.    Neurological:  Negative for weakness.       Objective:   Blood pressure (!) 94/0, temperature 97.8 °F (36.6 °C), temperature source Oral, height 6' (1.829 m), weight 76.7 kg (169 lb).body mass index is 22.92 kg/m².    Doppler: 94    Physical Exam  Vitals reviewed.   Constitutional:       Appearance: He is well-developed.      Comments: BP (!) 94/0 (BP Location: Left arm, Patient Position: Sitting)   Temp 97.8 °F (36.6 °C) (Oral)   Ht 6' (1.829 m)   Wt 76.7 kg (169 lb)   BMI 22.92 kg/m²      HENT:      Head: Normocephalic.   Neck:      Vascular: No carotid bruit or JVD.   Cardiovascular:      Heart sounds: No murmur heard.     Comments: Smooth VAD hum. DLES is "1"  Pulmonary:      Effort: Pulmonary effort is normal.      Breath sounds: Normal breath sounds.   Abdominal:      General: Bowel sounds are normal.      Palpations: Abdomen is soft.   Skin:     General: Skin is warm.   Neurological:      Mental Status: He is alert.         Lab Results   Component Value Date    WBC 5.76 02/01/2024    HGB 8.2 (L) 02/01/2024    HCT 27.5 (L) 02/01/2024    MCV 93 02/01/2024     02/01/2024    CO2 24 02/01/2024    CREATININE 3.5 (H) 02/01/2024    CALCIUM 8.9 02/01/2024    ALBUMIN 2.6 (L) 02/01/2024    AST 17 02/01/2024    BNP 1,104 (H) 02/01/2024    ALT 14 02/01/2024     (H) 02/01/2024       Lab Results "   Component Value Date    INR 2.0 (H) 02/01/2024    INR 2.5 (H) 01/24/2024    INR 2.2 (H) 01/23/2024       BNP   Date Value Ref Range Status   02/01/2024 1,104 (H) 0 - 99 pg/mL Final     Comment:     Values of less than 100 pg/ml are consistent with non-CHF populations.   01/24/2024 963 (H) 0 - 99 pg/mL Final     Comment:     Values of less than 100 pg/ml are consistent with non-CHF populations.   01/22/2024 1,242 (H) 0 - 99 pg/mL Final     Comment:     Values of less than 100 pg/ml are consistent with non-CHF populations.       LD   Date Value Ref Range Status   02/01/2024 315 (H) 110 - 260 U/L Final     Comment:     Results are increased in hemolyzed samples.   01/24/2024 292 (H) 110 - 260 U/L Final     Comment:     Results are increased in hemolyzed samples.   01/23/2024 292 (H) 110 - 260 U/L Final     Comment:     Results are increased in hemolyzed samples.       Assessment:      1. Presence of ventricular assist device    2. LVAD (left ventricular assist device) present [Z95.811]    3. Chronic combined systolic and diastolic heart failure    4. Ventricular tachycardia    5. Coronary artery disease involving native coronary artery of native heart without angina pectoris    6. Type 2 diabetes mellitus without complication, without long-term current use of insulin        Plan:   Patient is now NYHA class II. BP is controlled.  INR is therapeutic.  In view of his bump in creatinine and euvolemia on exam, will decrease his torsemide to 80 mg every am and 40 mg every pm.   VAD interrogation was performed in clinic  Any VAD management kits dispensed today medically necessary  Recommend 2 gram sodium restriction and 1500cc fluid restriction.  Encourage physical activity with graded exercise program.  Requested patient to weigh themselves daily, and to notify us if their weight increases by more than 3 lbs in 1 day or 5 lbs in 1 week.   Additionally, the patient has a patient centered goal of being able to get stronger  and recover form his LVAD surgery.  RTC in 1 week    Patient advised that it is recommended that all patients, and their close contacts and household members receive Covid vaccination.    Listed for transplant: No    UNOS Patient Status  Functional Status: 50% - Requires considerable assistance and frequent medical care  Physical Capacity: No Limitations  Working for Income: No  If no, reason not working: Demands of Treatment      Brayan Ocampo MD

## 2024-02-01 NOTE — PROGRESS NOTES
Patient seen and examined. Patient is progressively increasing activity. No significant complaints.  Small area where scab fell off is present, wife reports that it bleed a little this morning.  Area completed covered at time of assessment with no drainage noted.  Area is small without concerns at this time however patient and wife given detailed instruction and counseling on contact CTS with any concerns regarding MSI or chest tube site.  Both agree with plan, clinic number provided to both family and patient.      Sternum: stable, incision CDI  Chest xray: Acceptable post op chest       Assessment:  1. Redo sternotomy   2. Extensive lysis of adhesion which increased the complexity of the operation due to dense calcific addition increasing operative time by more than 1-1/2 hours  3. Mitral valve repair-Edge to edge repair-through the LV apex using Noble stitch   4. Aortic valve repair  5.  Implantation of left ventricular assist device-HeartMate 3  6. Cutdown of the right femoral artery  7. Removal of intra-aortic balloon pump from the right femoral artery .   8. Primary repair of the right femoral artery   9. Temporary closure of the chest     Plan:  Can begin driving   We will refer to [facility] for continued care   No scheduled appointment, RTC prn

## 2024-02-01 NOTE — PATIENT INSTRUCTIONS
From today through February 26, please do not lift, push, or pull anything greater than 20 pounds.  Beginning on February 27, you will no longer have any lifting, pushing, or pulling restrictions.     Please continue to wash the incision to the middle of your chest and where your chest tubes were (in the middle of your abdomen) everyday with soap and water and pat them dry with a clean towel.  Please do not apply anything to your incisions other than soap and water until you are 3 months out from your surgery.     If you have any questions about the incision to the middle of your chest or where your chest tubes were (in the middle of your abdomen), please call Dr. Washington's office at [phone].

## 2024-02-01 NOTE — PROGRESS NOTES
Met with pt and his SO in CTS clinic today, for his p/o visit, s/p LVAD insertion on 12/1 (and discharge from rehab this morning).  Reminded pt that from today through 2/26 that he should not lift, push, or pull anything greater than 20 pounds, with restrictions lifted on 2/27.  Pt verbalized understanding to his lifting, pushing, and pulling instructions, and was provided with an AVS, which contained these instructions.  Instructed pt to wash his mid-sternal and chest tube site incisions daily with soap and water, patting them dry with a clean towel, and to leave them open to air.  Pt and his SO were instructed to notify the team if pt experiences redness, swelling, drainage or warmth to his incisions, or if he experiences any fevers.  Pt and SO reminded to clean and dress pt's driveline site, as instructed by the LVAD team, which they verbalized understanding to.  Pt encouraged to walk as much as he can, throughout each day.  Pt and his SO were instructed to call the clinic with any questions or concerns.  Pt and SO verbalized understanding and denied having questions to above instructions.

## 2024-02-01 NOTE — LETTER
February 1, 2024        Kevin Franklin  [address] [place] [address]  Phone: [phone]  Fax: [phone]             [id number]  [address]  [facility] [address]  Phone: [phone]   Patient: Radha Abbott   MR Number: [id number]   YOB: 1960   Date of Visit: 2/1/2024       Dear Dr. Kevin Franklin    Thank you for referring Radha Abbott to me for evaluation. Attached you will find relevant portions of my assessment and plan of care.    If you have questions, please do not hesitate to call me. I look forward to following Radha Abbott along with you.    Sincerely,    Brayan Ocampo MD    Enclosure    If you would like to receive this communication electronically, please contact [email or web address] or [phone] to request [employer] Link access.    [employer] Link is a tool which provides read-only access to select patient information with whom you have a relationship. Its easy to use and provides real time access to review your patients record including encounter summaries, notes, results, and demographic information.    If you feel you have received this communication in error or would no longer like to receive these types of communications, please e-mail [email or web address]

## 2024-02-01 NOTE — PROCEDURES
2/1/2024    12:11 PM 1/24/2024     8:00 AM 1/24/2024     4:57 AM 1/23/2024    11:53 PM 1/23/2024     8:37 PM 1/23/2024     4:10 PM 1/23/2024    12:54 PM   TXP PINA INTERROGATIONS   Type HeartMate3         Flow 3.7         Speed 5000         PI 6.7         Power (Carrington) 3.4         LSL 4600         Pulsatility No Pulse No Pulse Intermittent pulse No Pulse Pulse Intermittent pulse Intermittent pulse   }

## 2024-02-01 NOTE — PROGRESS NOTES
Date of Implant with Heartmate 3 LVAD: 12/1/23    PATIENT ARRIVED IN CLINIC:  ambulatory/wheelchair  Accompanied by: wife  Complaints/reason for visit today: routine    Vitals  Temperature, oral:   Temp Readings from Last 1 Encounters:   02/01/24 97.8 °F (36.6 °C) (Oral)     Blood Pressure:   BP Readings from Last 3 Encounters:   02/01/24 (!) 94/0   01/24/24 (!) 76/0   11/06/23 92/60        VAD Interrogation:      2/1/2024    12:11 PM 1/24/2024     8:00 AM 1/24/2024     4:57 AM   TXP PINA INTERROGATIONS   Type HeartMate3     Flow 3.7     Speed 5000     PI 6.7     Power (Carrington) 3.4     LSL 4600     Pulsatility No Pulse No Pulse Intermittent pulse     HCT:   Lab Results   Component Value Date    HCT 27.5 (L) 02/01/2024    HCT 24 (L) 12/26/2023       Problems / Issues / Alarms with VAD if any: None noted  VAD Sounds: HM3 Smooth    VAD Binder With Patient: Yes  Reviewed VAD Numbers In Binder: Yes    Equipment:  Emergency Equipment With Patient: Yes  Any Equipment Issues: None noted   It is medically necessary to ensure patient has properly functioning equipment and wearables to prevent infection, injury or death to patient.     DLES Assessment and Dressing Care:  Appearance of DLES: 1  Antibiotics: NO  Velour: No  Manual & Visual Inspection Of Driveline: No kinks or tears noted  Stabilization Device In Use: yes, goodman securement device    Heartmate 3 Module Cable:  No yellow exposed and Attempted to unscrew modular cable to ensure it will be able to come lose in the event we ever need to change the modular cable while patient held the driveline in place so it would not move. Modular cable connection able to be unscrewed and re-tightened. Instructed pt to perform this weekly.  Frequency of Dressing Changes: every three days & silverlon kit   Pt In Need Of Management Kits?:yes -   2 Box of silverlon kit  It is medically necessary to have VAD management kits in order to prevent infection or to assist in the healing of an  infected DLES.    Patient MyChart Questionnaire:        No data to display                 Assessment/ Quality of Life Survery:   Complaints Of Nausea / Vomiting: None noted    Appearance and Frequency Of Stools: normal and formed without blood & daily  Color Of Urine: clear/yellow  Pain: NO  Coping/Depression/Anxiety: coping okay  Sleep Habits: 7-8 hrs /night  Sleep Aids: None noted  Showering: No  Self- Care I have some problems washing or dressing myself  Mobility I have some problems in walking about  Usual Activities I have some problems with performing my usual activities  Activity/Exercise: discharged from inpatient rehab today. Will start home PT/OT with HH   Driving: No.  Additional Comments: n/a    Labs:    Chemistry        Component Value Date/Time     02/01/2024 1007    K 4.4 02/01/2024 1007     02/01/2024 1007    CO2 24 02/01/2024 1007    BUN 54 (H) 02/01/2024 1007    CREATININE 3.5 (H) 02/01/2024 1007    GLU 99 02/01/2024 1007        Component Value Date/Time    CALCIUM 8.9 02/01/2024 1007    ALKPHOS 98 02/01/2024 1007    AST 17 02/01/2024 1007    ALT 14 02/01/2024 1007    BILITOT 0.3 02/01/2024 1007    ESTGFRAFRICA >60.0 02/07/2018 0935    EGFRNONAA >60.0 02/07/2018 0935            Magnesium   Date Value Ref Range Status   02/01/2024 1.8 1.6 - 2.6 mg/dL Final       Lab Results   Component Value Date    WBC 5.76 02/01/2024    HGB 8.2 (L) 02/01/2024    HCT 27.5 (L) 02/01/2024    MCV 93 02/01/2024     02/01/2024       Lab Results   Component Value Date    INR 2.0 (H) 02/01/2024    INR 2.5 (H) 01/24/2024    INR 2.2 (H) 01/23/2024       BNP   Date Value Ref Range Status   02/01/2024 1,104 (H) 0 - 99 pg/mL Final     Comment:     Values of less than 100 pg/ml are consistent with non-CHF populations.   01/24/2024 963 (H) 0 - 99 pg/mL Final     Comment:     Values of less than 100 pg/ml are consistent with non-CHF populations.   01/22/2024 1,242 (H) 0 - 99 pg/mL Final     Comment:     Values  of less than 100 pg/ml are consistent with non-CHF populations.       LD   Date Value Ref Range Status   02/01/2024 315 (H) 110 - 260 U/L Final     Comment:     Results are increased in hemolyzed samples.   01/24/2024 292 (H) 110 - 260 U/L Final     Comment:     Results are increased in hemolyzed samples.   01/23/2024 292 (H) 110 - 260 U/L Final     Comment:     Results are increased in hemolyzed samples.       Labs reviewed with patient: YES     Medication Reconciliation: per MA.  New Medication Detail Provided: yes  Coumadin Managed by: [facility]    Education: Reviewed driveline care, emergency procedures, how to change the controller, alarms with patient, as well as discussed how to page the VAD coordinator in case of an emergency.   Educated patient/family that chest compressions are allowed in the event they are needed.    Reminded patient/caregiver not to touch their face and to cover their mouth when they cough or sneeze.   Vaccines: Pt informed that we are encouraging all VAD patients to receive the Flu and COVID vaccines and boosters. Informed pt that they can take tylenol but should avoid other NSAIDs.       Plans/Needs: First clinic appt s/p VAD implant and inpatient rehab. Patient up in weight but euvolemic on exam. Suspect that weight gain of 7lbs in one week is healthy weight. Creatinine up. Decrease Torsemide from 80/80 to 80/40. Directions reviewed with wife and new med list provided.     Patient still had HD catheter as this was planned for patient to keep until outpatient to ensure he did not need to be restarted on HD. Oscar Duncan RN changed HD Catheter dressing today using sterile technique with central line dressing kit. Referral placed to Nephrology for HD catheter and plan.     RTC in 1 week.      Hurricane Season: No

## 2024-02-02 ENCOUNTER — PATIENT MESSAGE (OUTPATIENT)
Dept: CARDIOLOGY | Facility: CLINIC | Age: 64
End: 2024-02-02
Payer: MEDICAID

## 2024-02-02 ENCOUNTER — ANTI-COAG VISIT (OUTPATIENT)
Dept: CARDIOLOGY | Facility: CLINIC | Age: 64
End: 2024-02-02
Payer: MEDICAID

## 2024-02-02 DIAGNOSIS — Z95.811 LVAD (LEFT VENTRICULAR ASSIST DEVICE) PRESENT: Primary | ICD-10-CM

## 2024-02-02 RX ORDER — WARFARIN 2.5 MG/1
2.5 TABLET ORAL DAILY
Qty: 35 TABLET | Refills: 5 | Status: SHIPPED | OUTPATIENT
Start: 2024-02-02 | End: 2024-03-01 | Stop reason: SDUPTHER

## 2024-02-02 NOTE — PROGRESS NOTES
Patient wife confirmed that she has not picked up Rx from the pharmacy. She believes there was some confusion with the Rx that was called in. She is unsure of the d/c dosage and tablet strength, she states it was unclear on her end as well. Wife was unsure about pt dosing in the hospital as well. Patient to take Coumadin as prescribed until instructed otherwise and INR due date 2/5/24. Pt is using [facility] at this time.     Patient education completed regarding vitamin K diet, when to contact Coumadin Clinic, and Coumadin must be taken after 5 pm.  Diet plan is to avoid high vitamin K foods. Pt does not drink alcohol regularly.     Patient instructed to call clinic to verify Coumadin by mg tablet and color to complete enrollment once medication picked up from pharmacy.

## 2024-02-02 NOTE — PROGRESS NOTES
Spoke with Kristine with [facility] as well as [facility] (direct # [phone]). Pt came to the rehab facility with an alternating dose, but INR was running high. D/c dosing from rehab was 2.5mg daily.    Recent dosing was confirmed as:   1/24- 5mg  1/25- 2.5mg, INR 2.8  1/26- 1mg, INR 3.4  1/27- 0mg, INR 4.4  1/28- 0mg, INR 3.9  1/29- 0mg, INR 3.2  1/30- 1mg, INR 2.7  1/31- 2.5mg, INR 2.3  2/1- 2.5mg, INR 2.1

## 2024-02-02 NOTE — PROGRESS NOTES
Spoke with Kristine with [facility] as well as [facility] (direct # [phone]). Pt came to the rehab facility with an alternating dose, but INR was running high. D/c dosing from rehab was 2.5mg daily.    Recent dosing was confirmed as:   1/24- 5mg  1/25- 2.5mg, INR 2.8  1/26- 1mg, INR 3.4  1/27- 0mg, INR 4.4  1/28- 0mg, INR 3.9  1/29- 0mg, INR 3.2  1/30- 1mg, INR 2.7  1/31- 2.5mg, INR 2.3  2/1- 2.5mg, INR 2.1

## 2024-02-02 NOTE — PROGRESS NOTES
Spoke with pt wife to let her know Rx was called into pharmacy, 2.5mg green color tablet. Dosing instructions given. Orders sent to . Will f/u with pt before the end of the day to confirm tablet strength and color.

## 2024-02-02 NOTE — PROGRESS NOTES
Patient called requesting a prescription for warfarin be called in to [facility] pharmacy on [address] ph # [phone]--Pt not sure what mg tablet he was given in rehab     Completed Therapy?: Yes

## 2024-02-02 NOTE — PROGRESS NOTES
[facility] [address]  ph # [phone] called to report Patient is also on Amiodarone

## 2024-02-02 NOTE — PROGRESS NOTES
Radha Abbott is a 63 y.o. male s/p HM3 on 12/1/23. His hospital course was complicated by prolonged ICU stay and IVÁN requiring temporary RRT. Patient transferred to rehab and discharged 2/1/24.     Other PMHx includes HFrEF, Afib, Vfib, CAD, DM2, CKD3

## 2024-02-05 NOTE — PROGRESS NOTES
Sil with [facility]  called to report patient reported he had an appointment today and would not be home until after 4:30, requested to rosita lab draw date to tomorrow am, date changed, order faxed to  office.

## 2024-02-06 ENCOUNTER — TELEPHONE (OUTPATIENT)
Dept: TRANSPLANT | Facility: CLINIC | Age: 64
End: 2024-02-06
Payer: MEDICAID

## 2024-02-06 ENCOUNTER — LAB VISIT (OUTPATIENT)
Dept: LAB | Facility: HOSPITAL | Age: 64
End: 2024-02-06
Attending: INTERNAL MEDICINE
Payer: MEDICAID

## 2024-02-06 ENCOUNTER — ANTI-COAG VISIT (OUTPATIENT)
Dept: CARDIOLOGY | Facility: CLINIC | Age: 64
End: 2024-02-06
Payer: MEDICAID

## 2024-02-06 DIAGNOSIS — I50.9 HEART FAILURE, UNSPECIFIED: Primary | ICD-10-CM

## 2024-02-06 DIAGNOSIS — Z95.811 LVAD (LEFT VENTRICULAR ASSIST DEVICE) PRESENT: Primary | ICD-10-CM

## 2024-02-06 DIAGNOSIS — Z95.811 HEART REPLACED BY HEART ASSIST DEVICE: ICD-10-CM

## 2024-02-06 DIAGNOSIS — I48.91 ATRIAL FIBRILLATION: ICD-10-CM

## 2024-02-06 LAB
ALBUMIN SERPL BCP-MCNC: 2.8 G/DL (ref 3.5–5.2)
ALP SERPL-CCNC: 103 U/L (ref 55–135)
ALT SERPL W/O P-5'-P-CCNC: 17 U/L (ref 10–44)
ANION GAP SERPL CALC-SCNC: 12 MMOL/L (ref 8–16)
AST SERPL-CCNC: 18 U/L (ref 10–40)
BILIRUB SERPL-MCNC: 0.3 MG/DL (ref 0.1–1)
BNP SERPL-MCNC: 1258 PG/ML (ref 0–99)
BUN SERPL-MCNC: 53 MG/DL (ref 8–23)
CALCIUM SERPL-MCNC: 8.3 MG/DL (ref 8.7–10.5)
CHLORIDE SERPL-SCNC: 104 MMOL/L (ref 95–110)
CO2 SERPL-SCNC: 23 MMOL/L (ref 23–29)
CREAT SERPL-MCNC: 3.1 MG/DL (ref 0.5–1.4)
ERYTHROCYTE [DISTWIDTH] IN BLOOD BY AUTOMATED COUNT: 18.4 % (ref 11.5–14.5)
EST. GFR  (NO RACE VARIABLE): 22 ML/MIN/1.73 M^2
GLUCOSE SERPL-MCNC: 149 MG/DL (ref 70–110)
HCT VFR BLD AUTO: 25.7 % (ref 40–54)
HGB BLD-MCNC: 7.9 G/DL (ref 14–18)
INR PPP: 1.7 (ref 0.8–1.2)
MCH RBC QN AUTO: 28.2 PG (ref 27–31)
MCHC RBC AUTO-ENTMCNC: 30.7 G/DL (ref 32–36)
MCV RBC AUTO: 92 FL (ref 82–98)
PLATELET # BLD AUTO: 329 K/UL (ref 150–450)
PMV BLD AUTO: 9.6 FL (ref 9.2–12.9)
POTASSIUM SERPL-SCNC: 4 MMOL/L (ref 3.5–5.1)
PROT SERPL-MCNC: 7.4 G/DL (ref 6–8.4)
PROTHROMBIN TIME: 17 SEC (ref 9–12.5)
RBC # BLD AUTO: 2.8 M/UL (ref 4.6–6.2)
SODIUM SERPL-SCNC: 139 MMOL/L (ref 136–145)
WBC # BLD AUTO: 7.88 K/UL (ref 3.9–12.7)

## 2024-02-06 PROCEDURE — 85027 COMPLETE CBC AUTOMATED: CPT | Performed by: INTERNAL MEDICINE

## 2024-02-06 PROCEDURE — 83880 ASSAY OF NATRIURETIC PEPTIDE: CPT | Performed by: INTERNAL MEDICINE

## 2024-02-06 PROCEDURE — 80053 COMPREHEN METABOLIC PANEL: CPT | Performed by: INTERNAL MEDICINE

## 2024-02-06 PROCEDURE — 36415 COLL VENOUS BLD VENIPUNCTURE: CPT | Performed by: INTERNAL MEDICINE

## 2024-02-06 PROCEDURE — 93793 ANTICOAG MGMT PT WARFARIN: CPT | Mod: ,,,

## 2024-02-06 PROCEDURE — 85610 PROTHROMBIN TIME: CPT | Performed by: INTERNAL MEDICINE

## 2024-02-06 NOTE — PROGRESS NOTES
[facility]    2024 3:43 PM    Assessment/Plan:    Patient presents today with subtherapeutic  INR.    Assessment of patient findings and chart review: Reports edema - VAD team aware    Recommendation for patient's warfarin regimen: Boost dose today to 3.75mg then resume current maintenance dose    Recommend repeat INR Thursday  _________________________________________________________________    Radha Abbott (63 y.o.) is followed by the [facility].    Anticoagulation Summary  As of 2024      INR goal:  2.0-3.0   TTR:  0.0 % (3 d)   INR used for dosin.7 (2024)   Warfarin maintenance plan:  2.5 mg (2.5 mg x 1) every day   Weekly warfarin total:  17.5 mg   Plan last modified:  Jesenia Jensen, PharmD (2024)   Next INR check:  2024   Target end date:      Indications    LVAD (left ventricular assist device) present [Z95.811]                 Anticoagulation Episode Summary       INR check location:      Preferred lab:      Send INR reminders to:  [facility] COUMADIN LVAD    Comments:  [phone] [facility]          Anticoagulation Care Providers       Provider Role Specialty Phone number    Sajan Hurley MD [facility] [phone]

## 2024-02-06 NOTE — TELEPHONE ENCOUNTER
Patient's wife paged stating that patient's weight is 176 this morning from 171 yesterday morning. Both weight were without the VAD equipment. Wife confirms that patient's legs and face are swollen. Patient to go back to Torsemide 80/80 from 80/40 (was decreased in clinic). HH will obtain labs today.

## 2024-02-06 NOTE — PROGRESS NOTES
Arlyn reports correct Warfarin dose, edema in face and hips x1 day and stated LVAD Team aware, no other changes reported.

## 2024-02-08 ENCOUNTER — ANTI-COAG VISIT (OUTPATIENT)
Dept: CARDIOLOGY | Facility: CLINIC | Age: 64
End: 2024-02-08
Payer: MEDICAID

## 2024-02-08 ENCOUNTER — OFFICE VISIT (OUTPATIENT)
Dept: TRANSPLANT | Facility: CLINIC | Age: 64
End: 2024-02-08
Payer: MEDICAID

## 2024-02-08 ENCOUNTER — HOSPITAL ENCOUNTER (OUTPATIENT)
Dept: CARDIOLOGY | Facility: HOSPITAL | Age: 64
Discharge: HOME | End: 2024-02-08
Attending: INTERNAL MEDICINE
Payer: MEDICAID

## 2024-02-08 ENCOUNTER — CLINICAL SUPPORT (OUTPATIENT)
Dept: TRANSPLANT | Facility: CLINIC | Age: 64
End: 2024-02-08
Payer: MEDICAID

## 2024-02-08 VITALS — SYSTOLIC BLOOD PRESSURE: 88 MMHG | HEART RATE: 62 BPM | WEIGHT: 177 LBS | HEIGHT: 72 IN | BODY MASS INDEX: 23.98 KG/M2

## 2024-02-08 VITALS
HEIGHT: 72 IN | TEMPERATURE: 98 F | WEIGHT: 177 LBS | SYSTOLIC BLOOD PRESSURE: 88 MMHG | BODY MASS INDEX: 23.98 KG/M2 | HEART RATE: 85 BPM

## 2024-02-08 DIAGNOSIS — Z95.811 LVAD (LEFT VENTRICULAR ASSIST DEVICE) PRESENT: Primary | ICD-10-CM

## 2024-02-08 DIAGNOSIS — Z95.811 PRESENCE OF VENTRICULAR ASSIST DEVICE: ICD-10-CM

## 2024-02-08 DIAGNOSIS — I50.20 HFREF (HEART FAILURE WITH REDUCED EJECTION FRACTION): ICD-10-CM

## 2024-02-08 DIAGNOSIS — I50.22 CHRONIC SYSTOLIC CONGESTIVE HEART FAILURE: ICD-10-CM

## 2024-02-08 DIAGNOSIS — Z95.811 HEART REPLACED BY HEART ASSIST DEVICE: Primary | ICD-10-CM

## 2024-02-08 DIAGNOSIS — I25.10 CORONARY ARTERY DISEASE INVOLVING NATIVE CORONARY ARTERY OF NATIVE HEART WITHOUT ANGINA PECTORIS: ICD-10-CM

## 2024-02-08 PROBLEM — N17.9 ACUTE RENAL FAILURE ON DIALYSIS: Status: RESOLVED | Noted: 2023-12-20 | Resolved: 2024-02-08

## 2024-02-08 PROBLEM — N18.32 ACUTE RENAL FAILURE WITH ACUTE TUBULAR NECROSIS SUPERIMPOSED ON STAGE 3B CHRONIC KIDNEY DISEASE: Status: RESOLVED | Noted: 2023-10-08 | Resolved: 2024-02-08

## 2024-02-08 PROBLEM — Z99.2 ACUTE RENAL FAILURE ON DIALYSIS: Status: RESOLVED | Noted: 2023-12-20 | Resolved: 2024-02-08

## 2024-02-08 PROBLEM — N17.9 AKI (ACUTE KIDNEY INJURY): Status: RESOLVED | Noted: 2023-12-09 | Resolved: 2024-02-08

## 2024-02-08 PROBLEM — E87.6 HYPOKALEMIA: Status: RESOLVED | Noted: 2023-10-08 | Resolved: 2024-02-08

## 2024-02-08 PROBLEM — N17.0 ACUTE RENAL FAILURE WITH ACUTE TUBULAR NECROSIS SUPERIMPOSED ON STAGE 3B CHRONIC KIDNEY DISEASE: Status: RESOLVED | Noted: 2023-10-08 | Resolved: 2024-02-08

## 2024-02-08 PROBLEM — N18.9 ACUTE KIDNEY INJURY SUPERIMPOSED ON CHRONIC KIDNEY DISEASE: Status: RESOLVED | Noted: 2023-12-20 | Resolved: 2024-02-08

## 2024-02-08 PROBLEM — N17.9 ACUTE KIDNEY INJURY SUPERIMPOSED ON CHRONIC KIDNEY DISEASE: Status: RESOLVED | Noted: 2023-12-20 | Resolved: 2024-02-08

## 2024-02-08 PROBLEM — N17.0 ACUTE RENAL FAILURE WITH TUBULAR NECROSIS: Status: RESOLVED | Noted: 2023-12-18 | Resolved: 2024-02-08

## 2024-02-08 PROBLEM — E87.5 HYPERKALEMIA: Status: RESOLVED | Noted: 2024-01-19 | Resolved: 2024-02-08

## 2024-02-08 LAB
ASCENDING AORTA: 3.19 CM
BSA FOR ECHO PROCEDURE: 2.02 M2
CV ECHO LV RWT: 0.33 CM
DOP CALC LVOT AREA: 3.2 CM2
DOP CALC LVOT DIAMETER: 2.02 CM
E WAVE DECELERATION TIME: 122.45 MSEC
E/A RATIO: 1.58
E/E' RATIO: 20.36 M/S
ECHO LV POSTERIOR WALL: 1.05 CM (ref 0.6–1.1)
FRACTIONAL SHORTENING: 1 % (ref 28–44)
INTERVENTRICULAR SEPTUM: 0.75 CM (ref 0.6–1.1)
LA MAJOR: 5.37 CM
LA MINOR: 5.4 CM
LA WIDTH: 4.8 CM
LEFT ATRIUM SIZE: 4.74 CM
LEFT ATRIUM VOLUME INDEX MOD: 36.4 ML/M2
LEFT ATRIUM VOLUME INDEX: 51.6 ML/M2
LEFT ATRIUM VOLUME MOD: 73.59 CM3
LEFT ATRIUM VOLUME: 104.14 CM3
LEFT INTERNAL DIMENSION IN SYSTOLE: 6.21 CM (ref 2.1–4)
LEFT VENTRICLE DIASTOLIC VOLUME INDEX: 98.75 ML/M2
LEFT VENTRICLE DIASTOLIC VOLUME: 199.48 ML
LEFT VENTRICLE MASS INDEX: 116 G/M2
LEFT VENTRICLE SYSTOLIC VOLUME INDEX: 96.3 ML/M2
LEFT VENTRICLE SYSTOLIC VOLUME: 194.52 ML
LEFT VENTRICULAR INTERNAL DIMENSION IN DIASTOLE: 6.28 CM (ref 3.5–6)
LEFT VENTRICULAR MASS: 233.43 G
LV LATERAL E/E' RATIO: 16 M/S
LV SEPTAL E/E' RATIO: 28 M/S
LVAD BASE RATE: 5000 RPM
MV PEAK A VEL: 0.71 M/S
MV PEAK E VEL: 1.12 M/S
MV STENOSIS PRESSURE HALF TIME: 35.51 MS
MV VALVE AREA P 1/2 METHOD: 6.2 CM2
RA MAJOR: 5.09 CM
RA PRESSURE ESTIMATED: 3 MMHG
RA WIDTH: 4.17 CM
RIGHT VENTRICULAR END-DIASTOLIC DIMENSION: 4.3 CM
SINUS: 3.22 CM
STJ: 2.87 CM
TDI LATERAL: 0.07 M/S
TDI SEPTAL: 0.04 M/S
TDI: 0.06 M/S
TRICUSPID ANNULAR PLANE SYSTOLIC EXCURSION: 1.66 CM
Z-SCORE OF LEFT VENTRICULAR DIMENSION IN END DIASTOLE: 0.54
Z-SCORE OF LEFT VENTRICULAR DIMENSION IN END SYSTOLE: 4.2

## 2024-02-08 PROCEDURE — 93306 TTE W/DOPPLER COMPLETE: CPT

## 2024-02-08 PROCEDURE — 99213 OFFICE O/P EST LOW 20 MIN: CPT | Mod: PBBFAC,25 | Performed by: INTERNAL MEDICINE

## 2024-02-08 PROCEDURE — 93750 INTERROGATION VAD IN PERSON: CPT | Mod: S$PBB,,, | Performed by: INTERNAL MEDICINE

## 2024-02-08 PROCEDURE — 99214 OFFICE O/P EST MOD 30 MIN: CPT | Mod: S$PBB,,, | Performed by: INTERNAL MEDICINE

## 2024-02-08 PROCEDURE — 93306 TTE W/DOPPLER COMPLETE: CPT | Mod: 26,,, | Performed by: INTERNAL MEDICINE

## 2024-02-08 PROCEDURE — 99999 PR PBB SHADOW E&M-EST. PATIENT-LVL III: CPT | Mod: PBBFAC,,, | Performed by: INTERNAL MEDICINE

## 2024-02-08 PROCEDURE — 3044F HG A1C LEVEL LT 7.0%: CPT | Mod: CPTII,,, | Performed by: INTERNAL MEDICINE

## 2024-02-08 PROCEDURE — 1111F DSCHRG MED/CURRENT MED MERGE: CPT | Mod: CPTII,,, | Performed by: INTERNAL MEDICINE

## 2024-02-08 PROCEDURE — 3008F BODY MASS INDEX DOCD: CPT | Mod: CPTII,,, | Performed by: INTERNAL MEDICINE

## 2024-02-08 RX ORDER — METOLAZONE 2.5 MG/1
2.5 TABLET ORAL
Qty: 8 TABLET | Refills: 11 | Status: ON HOLD | OUTPATIENT
Start: 2024-02-08 | End: 2024-03-27

## 2024-02-08 RX ORDER — POTASSIUM CHLORIDE 20 MEQ/1
TABLET, EXTENDED RELEASE ORAL
Qty: 20 TABLET | Refills: 5 | Status: ON HOLD | OUTPATIENT
Start: 2024-02-08 | End: 2024-03-27 | Stop reason: HOSPADM

## 2024-02-08 NOTE — LETTER
February 8, 2024        Kevin Franklin  [address] [place] [address]  Phone: [phone]  Fax: [phone]             [facility]  [address]  [facility] [address]  Phone: [phone]   Patient: Radha Abbott   MR Number: [id number]   YOB: 1960   Date of Visit: 2/8/2024       Dear Dr. Kevin Franklin    Thank you for referring Radha Abbott to me for evaluation. Attached you will find relevant portions of my assessment and plan of care.    If you have questions, please do not hesitate to call me. I look forward to following Radha Abbott along with you.    Sincerely,    Sajan Casas MD    Enclosure    If you would like to receive this communication electronically, please contact [email or web address] or [phone] to request LetGive Link access.    LetGive Link is a tool which provides read-only access to select patient information with whom you have a relationship. Its easy to use and provides real time access to review your patients record including encounter summaries, notes, results, and demographic information.    If you feel you have received this communication in error or would no longer like to receive these types of communications, please e-mail [email or web address]

## 2024-02-08 NOTE — PATIENT INSTRUCTIONS
You have extra fluid on you.  Please adhere to a low sodium diet (no more than 1.5 grams of sodium in 24h).  3.   Follow fluid restriction of  1. no more than 2 liters in 24 hours..   4. Start metolazone 2.5 mg twice a week.  5.  Take potassium 40 meq once daily, ONLY the days you take metolazone.  6. Follow up blood test next week.

## 2024-02-08 NOTE — PROGRESS NOTES
[facility] Follow up.      Attending Physician: Sajan Cassa MD.  The patient's last visit with me was on Visit date not found.         HPI.  Mr. Radha Abbott is a very pleasant 62 yo male with stage D HFrEF, ICMP who was recently discharged from our service after being treated for ADHF/CS. He did undergo HM3 placement placed by Dr Washington on 12/1/23. Lengthy post op course complicated by vasoplegia(requiring Giaprezza), debility, malnutrition/impaired swallowing, and renal failure requiring HD(since weaned off). Once medically stable, he was transferred to Rehab for further PT/OT/ST. Of note, pt left with permacath still in place. He has done really well in rehab and today was discharged from rehab.     February 8, 2024:had fluid build up. His torsemide was increased from 80/40 to 80/80 in the last 2 days. His weight is down 2 pounds. His BNP is 1100 today.  He has leg edema and abdominal distention.       Review of Systems   Constitutional:  Positive for fatigue. Negative for activity change, chills, diaphoresis and fever.   HENT:  Negative for nasal congestion, rhinorrhea and sore throat.    Eyes:  Negative for visual disturbance.   Respiratory:  Positive for shortness of breath.    Cardiovascular:  Positive for leg swelling. Negative for chest pain.   Gastrointestinal:  Positive for abdominal distention. Negative for abdominal pain, diarrhea, nausea and vomiting.   Genitourinary:  Negative for difficulty urinating, dysuria and hematuria.   Integumentary:  Negative for rash.   Neurological:  Negative for seizures, syncope and light-headedness.   Psychiatric/Behavioral:  Negative for agitation and hallucinations.         Past Medical History:   Diagnosis Date    CAD (coronary artery disease)     Diabetes mellitus     HFrEF (heart failure with reduced ejection fraction)     ICD  (implantable cardioverter-defibrillator) in place     MI, old         Past Surgical History:   Procedure Laterality Date    ANGIOPLASTY-VENOUS ARTERY Right 12/1/2023    Procedure: ANGIOPLASTY-VENOUS ARTERY, RIGHT FEMORAL;  Surgeon: Yuri Washington MD;  Location: [facility] OR [facility] FLR;  Service: Cardiovascular;  Laterality: Right;    AORTIC VALVULOPLASTY N/A 12/1/2023    Procedure: REPAIR, AORTIC VALVE;  Surgeon: Yuri Washington MD;  Location: [facility] OR [facility] FLR;  Service: Cardiovascular;  Laterality: N/A;    CARDIAC SURGERY      CLOSURE N/A 12/1/2023    Procedure: CLOSURE, TEMPORARY;  Surgeon: Yuri Washington MD;  Location: [facility] OR [facility] FLR;  Service: Cardiovascular;  Laterality: N/A;    DRAINAGE OF PLEURAL EFFUSION  12/4/2023    Procedure: DRAINAGE, PLEURAL EFFUSION;  Surgeon: Yuri Washington MD;  [facility];  Service: Cardiovascular;;    INSERTION OF GRAFT TO PERICARDIUM  12/4/2023    Procedure: INSERTION, GRAFT, PERICARDIUM;  Surgeon: Yuri Washington MD;  [facility];  Service: Cardiovascular;;    INSERTION OF INTRA-AORTIC BALLOON ASSIST DEVICE Right 11/21/2023    Procedure: INSERTION, INTRA-AORTIC BALLOON PUMP;  Surgeon: Finn Cohn MD;  Location: [facility] CATH LAB;  Service: Cardiology;  Laterality: Right;    LEFT VENTRICULAR ASSIST DEVICE Left 12/1/2023    Procedure: INSERTION-LEFT VENTRICULAR ASSIST DEVICE;  Surgeon: Yuri Washington MD;  [facility];  Service: Cardiovascular;  Laterality: Left;  REDO STERNOTOMY - REDO SAW NEEDED FOR CASE    LYSIS OF ADHESIONS  12/1/2023    Procedure: LYSIS, ADHESIONS;  Surgeon: Yuri Washington MD;  Location: [facility] OR [facility] FLR;  Service: Cardiovascular;;    PLACEMENT OF SWAN ROLANDO CATHETER WITH IMAGING GUIDANCE  11/20/2023    Procedure: INSERTION, CATHETER, SWAN-ROLANDO, WITH IMAGING GUIDANCE;  Surgeon: Sajan Hurley MD;  Location: [facility] CATH LAB;  Service: Cardiology;;    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 12/1/2023     Procedure: REPAIR, ANEURYSM, ARTERY, FEMORAL;  Surgeon: Yuri Washington MD;  [address]  Service: Cardiovascular;  Laterality: Right;  Right Femoral Artery Repair    RIGHT HEART CATHETERIZATION Right 10/10/2023    Procedure: INSERTION, CATHETER, RIGHT HEART;  Surgeon: Bin Gandhi MD;  Location: [facility] CATH LAB;  Service: Cardiology;  Laterality: Right;    RIGHT HEART CATHETERIZATION Right 10/13/2023    Procedure: INSERTION, CATHETER, RIGHT HEART;  Surgeon: Walter Mcintyre MD;  Location: [facility] CATH LAB;  Service: Cardiology;  Laterality: Right;    RIGHT HEART CATHETERIZATION  11/13/2023    RIGHT HEART CATHETERIZATION Right 11/13/2023    Procedure: INSERTION, CATHETER, RIGHT HEART;  Surgeon: Juventino Bermudez Jr., MD;  Location: [facility] CATH LAB;  Service: Cardiology;  Laterality: Right;    RIGHT HEART CATHETERIZATION Right 11/20/2023    Procedure: INSERTION, CATHETER, RIGHT HEART;  Surgeon: Sajan Hurley MD;  Location: [facility] CATH LAB;  Service: Cardiology;  Laterality: Right;    RIGHT HEART CATHETERIZATION Right 1/22/2024    Procedure: INSERTION, CATHETER, RIGHT HEART;  Surgeon: Brayan Ocampo MD;  Location: [facility] CATH LAB;  Service: Cardiology;  Laterality: Right;    STERNAL WOUND CLOSURE N/A 12/4/2023    Procedure: CLOSURE, WOUND, STERNUM;  Surgeon: Yuri Washington MD;  Location: [address];  Service: Cardiovascular;  Laterality: N/A;    STERNOTOMY N/A 12/1/2023    Procedure: STERNOTOMY, REDO;  Surgeon: Yuri Washington MD;  [address]  Service: Cardiovascular;  Laterality: N/A;    VALVULOPLASTY, MITRAL VALVE N/A 12/1/2023    Procedure: VALVULOPLASTY, MITRAL VALVE;  Surgeon: Yuri Washington MD;  [address]  Service: Cardiovascular;  Laterality: N/A;        No family history on file.     Review of patient's allergies indicates:  No Known Allergies     Current Outpatient Medications   Medication Instructions    acetaminophen (TYLENOL) 1,000 mg, Oral,  Every 8 hours PRN    amiodarone (PACERONE) 400 mg, Oral, Daily    famotidine (PEPCID) 20 mg, Oral, Daily    fluticasone propionate (FLONASE) 100 mcg, Each Nostril, Daily    gabapentin (NEURONTIN) 100 mg, Oral, 2 times daily    hydrALAZINE (APRESOLINE) 50 mg, Oral, Every 8 hours    magnesium oxide (MAG-OX) 400 mg, Oral, Daily         omega-3 acid ethyl esters (LOVAZA) 2 g, Oral, 2 times daily    OPW TEST CLAIM - DO NOT FILL OPW test claim. Do not fill.    polyethylene glycol (GLYCOLAX) 17 g, Oral, As needed (PRN)         senna-docusate 8.6-50 mg (PERICOLACE) 8.6-50 mg per tablet 2 tablets, Oral, Daily PRN    sodium bicarbonate 325 mg, Oral, 3 times daily    torsemide 80 mg, Oral, 2 times daily    traMADoL (ULTRAM) 50 mg, Oral, Every 12 hours PRN    traZODone (DESYREL) 25 mg, Oral, Nightly    venlafaxine (EFFEXOR) 37.5 mg, Oral, Daily    vitamin D (VITAMIN D3) 1,000 Units, Oral, Daily    warfarin (COUMADIN) 2.5 mg, Oral, Daily        Vitals:    02/08/24 1339   BP: (!) 88/0   Pulse:    Temp:         Wt Readings from Last 3 Encounters:   02/08/24 80.3 kg (177 lb)   02/01/24 76.7 kg (169 lb)   01/24/24 75 kg (165 lb 5.5 oz)     Temp Readings from Last 3 Encounters:   02/08/24 98 °F (36.7 °C) (Oral)   02/01/24 97.8 °F (36.6 °C) (Oral)   01/24/24 97.5 °F (36.4 °C) (Tympanic)     BP Readings from Last 3 Encounters:   02/08/24 (!) 88/0   02/01/24 (!) 94/0   01/24/24 (!) 76/0     Pulse Readings from Last 3 Encounters:   02/08/24 85   01/24/24 96   11/06/23 81        Body mass index is 24.01 kg/m². Estimated body surface area is 2.02 meters squared as calculated from the following:    Height as of this encounter: 6' (1.829 m).    Weight as of this encounter: 80.3 kg (177 lb).     Physical Exam  Constitutional:       Appearance: He is well-developed.   HENT:      Head: Normocephalic and atraumatic.      Right Ear: External ear normal.      Left Ear: External ear normal.   Eyes:      Conjunctiva/sclera: Conjunctivae normal.       Pupils: Pupils are equal, round, and reactive to light.   Neck:      Vascular: Hepatojugular reflux and JVD present.      Comments: JVP 16 cmH20. HJR to the angle of the jaw.  Cardiovascular:      Rate and Rhythm: Normal rate and regular rhythm.      Pulses: Intact distal pulses.      Heart sounds: S1 normal and S2 normal. No murmur heard.     No friction rub. No gallop.   Pulmonary:      Effort: Pulmonary effort is normal.      Breath sounds: Normal breath sounds.   Abdominal:      General: Bowel sounds are normal. There is no distension.      Palpations: Abdomen is soft.      Tenderness: There is no abdominal tenderness. There is no guarding or rebound.   Musculoskeletal:      Cervical back: Normal range of motion and neck supple.      Right lower le+ Edema present.      Left lower le+ Edema present.   Neurological:      Mental Status: He is alert and oriented to person, place, and time.          Lab Results   Component Value Date    BNP 1,133 (H) 2024     2024    K 4.2 2024    MG 1.6 2024     2024    CO2 25 2024    BUN 56 (H) 2024    CREATININE 2.8 (H) 2024     (H) 2024    HGBA1C 5.1 2024    AST 19 2024    ALT 18 2024    ALBUMIN 2.5 (L) 2024    PROT 7.4 2024    BILITOT 0.3 2024    WBC 6.06 2024    HGB 8.0 (L) 2024    HCT 26.5 (L) 2024    HCT 24 (L) 2023     2024    INR 1.7 (H) 2024     (H) 2024    TSH 1.381 11/15/2023    CHOL 107 (L) 11/15/2023    HDL 49 11/15/2023    LDLCALC 43.2 (L) 11/15/2023    TRIG 310 (H) 2023       Results for orders placed during the hospital encounter of 23    Echo    Interpretation Summary    LVAD: There is a Heartmate III LVAD running at 5000 RPM. The aortic valve does not open. The ventricular septum is at midline.    Left Ventricle: The left ventricle is severely dilated. Normal wall thickness. Severe  global hypokinesis present. There is severely reduced systolic function with a visually estimated ejection fraction of 5 - 10%. Diastolic function cannot be reliably determined in the presence of mitral valve repair.    Right Ventricle: Mild right ventricular enlargement. Wall thickness is normal. Right ventricle wall motion  is normal. Systolic function is mildly reduced.    The left atrium is severely dilated.    Mitral Valve: The mitral valve is repaired with an Noble stitch. There is mild regurgitation.    IVC/SVC: Elevated venous pressure at 15 mmHg.        Results for orders placed during the hospital encounter of 11/09/23    Cardiac catheterization    Conclusion    The estimated blood loss was <50 mL.    RHC performed via the right IJ. Patient tolerated the procedure well. Elevated left and right side filling pressures (RA= 13 mm of Hg, PCWP=22 mm of Hg). Mild to moderate pulmonary HTN  (PA= 50/25 mm of Hg, PA mean=35 mm of Hg) in the setting of elevated left sided filling pressures. Normal high cardiac index and output  (CI=2.98 L/min/m2, CO=5.8 L/min) with HM3 support at LVAD speed of 4900 rpm.    Recommend gentle IV diuresis/Echo to reassess LV /RV size and speed adjustment.    The procedure log was documented by Documenter: Deyanira Castañeda RCS and verified by Brayan Ocampo MD.    Date: 1/22/2024  Time: 10:30 AM         Assessment and Plan:  LVAD (left ventricular assist device) present  -     Cardiac Rehab Phase II; Future    Presence of ventricular assist device  -     LVAD Interrogations Out Patient Only    Coronary artery disease involving native coronary artery of native heart without angina pectoris    Chronic systolic congestive heart failure    HFrEF (heart failure with reduced ejection fraction)    Other orders  -     potassium chloride SA (K-DUR,KLOR-CON) 20 MEQ tablet; Take 40 meq once daily the days you take metolazone only.  Dispense: 20 tablet; Refill: 5  -     torsemide 40 mg Tab;  Take 80 mg by mouth 2 (two) times a day.  Dispense: 120 tablet; Refill: 5  -     metOLazone (ZAROXOLYN) 2.5 MG tablet; Take 1 tablet (2.5 mg total) by mouth twice a week.  Dispense: 8 tablet; Refill: 11          Chronic cardiomyopathy and systolic HF, NYHA class 3, stage D, s/p LVAD.  Hemodynamic status: warm, normotensive, hypervolemic.    Antithrombotic therapy and target anticoagulation: INR/Prothrombin Time 1.7. Adjustment to warfarin dose per anticoagulation clinic.      LVAD related complications:   -Bleeding (gastrointestinal, epistaxis, etc): none, HGB 8.0 is stable.  -RV failure: no overt RV failure but hypervolemic, suspected globally.  -Thrombotic events and LDH:  none, ld 296.  -DLES and Infection: 1, none.    Plan:  -Start metolazone 2.5 mg twice a week.  -Take potassium 40 meq once daily, ONLY the days you take metolazone.  -Follow up blood test next week.    Interrogation of Ventricular assist device was performed with physician analysis of device parameters and review of device function. I have personally reviewed the interrogation findings and agree with findings as stated.     Additionally, the patient has a patient centered goal of being able to improve their quality of life .

## 2024-02-08 NOTE — PROGRESS NOTES
Arlyn reports correct Warfarin dose, right hip pain due to abnormal gait, no other changes reported.

## 2024-02-08 NOTE — PROGRESS NOTES
[facility]    2024 1:44 PM    Assessment/Plan:    Patient presents today with subtherapeutic  INR.    Assessment of patient findings and chart review: no significant findings     Recommendation for patient's warfarin regimen: Increase maintenance dose    Recommend repeat INR Monday  _________________________________________________________________    Radha Abbott (63 y.o.) is followed by the [facility].    Anticoagulation Summary  As of 2024      INR goal:  2.0-3.0   TTR:  0.0 % (5 d)   INR used for dosin.7 (2024)   Warfarin maintenance plan:  3.75 mg (2.5 mg x 1.5) every Sun, Thu; 2.5 mg (2.5 mg x 1) all other days   Weekly warfarin total:  20 mg   Plan last modified:  Jesenia Jensen, PharmD (2024)   Next INR check:  2024   Target end date:      Indications    LVAD (left ventricular assist device) present [Z95.811]                 Anticoagulation Episode Summary       INR check location:      Preferred lab:      Send INR reminders to:  [facility] COUMADIN LVAD    Comments:  [phone] [facility]           Anticoagulation Care Providers       Provider Role Specialty Phone number    Sajan Hurley MD [facility] Cardiology [phone]

## 2024-02-09 NOTE — PROGRESS NOTES
Date of Implant with Heartmate 3 LVAD: 12/1/23    PATIENT ARRIVED IN CLINIC:  wheelchair  Accompanied by: wife  Complaints/reason for visit today: routine    Vitals  Temperature, oral:   Temp Readings from Last 1 Encounters:   02/08/24 98 °F (36.7 °C) (Oral)     Blood Pressure:   BP Readings from Last 3 Encounters:   02/08/24 (!) 88/0   02/08/24 (!) 88/0   02/01/24 (!) 94/0        VAD Interrogation:      2/8/2024     1:39 PM 2/1/2024    12:11 PM 1/24/2024     8:00 AM   TXP PINA INTERROGATIONS   Type HeartMate3 HeartMate3    Flow 3.6 3.7    Speed 5000 5000    PI 7.6 6.7    Power ([place]) 3.5 3.4    LSL 4600 4600    Pulsatility No Pulse No Pulse No Pulse     HCT:   Lab Results   Component Value Date    HCT 26.5 (L) 02/08/2024    HCT 24 (L) 12/26/2023       Problems / Issues / Alarms with VAD if any: None noted  VAD Sounds: HM3 Smooth    VAD Binder With Patient: Yes  Reviewed VAD Numbers In Binder: Yes    Equipment:  Emergency Equipment With Patient: Yes  Any Equipment Issues: None noted   It is medically necessary to ensure patient has properly functioning equipment and wearables to prevent infection, injury or death to patient.     DLES Assessment and Dressing Care:  Appearance of DLES: 1  Antibiotics: NO  Velour: No  Manual & Visual Inspection Of Driveline: No kinks or tears noted  Stabilization Device In Use: yes, goodman securement device    Heartmate 3 Module Cable:  No yellow exposed and Attempted to unscrew modular cable to ensure it will be able to come lose in the event we ever need to change the modular cable while patient held the driveline in place so it would not move. Modular cable connection able to be unscrewed and re-tightened. Instructed pt to perform this weekly.  Frequency of Dressing Changes: every three days & silverlon kit   Pt In Need Of Management Kits?:no   It is medically necessary to have VAD management kits in order to prevent infection or to assist in the healing of an infected  "DLES.    Patient MyChart Questionnaire:        No data to display                 Assessment/ Quality of Life [place]:   Complaints Of Nausea / Vomiting: None noted    Appearance and Frequency Of Stools: normal and formed without blood & daily  Color Of Urine: clear/yellow  Pain: NO  Coping/Depression/Anxiety: coping okay  Sleep Habits: 7-8 hrs /night  Sleep Aids: None noted  Showering: No  Self- Care I have some problems washing or dressing myself  Mobility I have some problems in walking about  Usual Activities I have some problems with performing my usual activities  Activity/Exercise: HH with PT/OT. Referral to cardiac rehab today.   Driving: No.  Additional Comments: "I want to continue getting stronger."    Labs:    Chemistry        Component Value Date/Time     02/08/2024 1151    K 4.2 02/08/2024 1151     02/08/2024 1151    CO2 25 02/08/2024 1151    BUN 56 (H) 02/08/2024 1151    CREATININE 2.8 (H) 02/08/2024 1151     (H) 02/08/2024 1151        Component Value Date/Time    CALCIUM 8.9 02/08/2024 1151    ALKPHOS 102 02/08/2024 1151    AST 19 02/08/2024 1151    ALT 18 02/08/2024 1151    BILITOT 0.3 02/08/2024 1151    ESTGFRAFRICA >60.0 02/07/2018 0935    EGFRNONAA >60.0 02/07/2018 0935            Magnesium   Date Value Ref Range Status   02/08/2024 1.6 1.6 - 2.6 mg/dL Final       Lab Results   Component Value Date    WBC 6.06 02/08/2024    HGB 8.0 (L) 02/08/2024    HCT 26.5 (L) 02/08/2024    MCV 93 02/08/2024     02/08/2024       Lab Results   Component Value Date    INR 1.7 (H) 02/08/2024    INR 1.7 (H) 02/06/2024    INR 2.0 (H) 02/01/2024       BNP   Date Value Ref Range Status   02/08/2024 1,133 (H) 0 - 99 pg/mL Final     Comment:     Values of less than 100 pg/ml are consistent with non-CHF populations.   02/06/2024 1,258 (H) 0 - 99 pg/mL Final     Comment:     Values of less than 100 pg/ml are consistent with non-CHF populations.   02/01/2024 1,104 (H) 0 - 99 pg/mL Final     " Comment:     Values of less than 100 pg/ml are consistent with non-CHF populations.       LD   Date Value Ref Range Status   02/08/2024 296 (H) 110 - 260 U/L Final     Comment:     Results are increased in hemolyzed samples.   02/01/2024 315 (H) 110 - 260 U/L Final     Comment:     Results are increased in hemolyzed samples.   01/24/2024 292 (H) 110 - 260 U/L Final     Comment:     Results are increased in hemolyzed samples.       Labs reviewed with patient: YES     Medication Reconciliation: per MA.  New Medication Detail Provided: yes  [facility]    Education: Reviewed driveline care, emergency procedures, how to change the controller, alarms with patient, as well as discussed how to page the VAD coordinator in case of an emergency.   Educated patient/family that chest compressions are allowed in the event they are needed.    Reminded patient/caregiver not to touch their face and to cover their mouth when they cough or sneeze.   Vaccines: Pt informed that we are encouraging all VAD patients to receive the Flu and COVID vaccines and boosters. Informed pt that they can take tylenol but should avoid other NSAIDs.       Plans/Needs: second weekly appt. Patient up 8 pounds from last week but has lost 2 lbs per home weight from Tuesday to today. I had patient increase Torsemide back to 80/80 from 80/40 on Tuesday. Will continue patient on Torsemide 80/80, add metolazone 2.5mg twice weekly and take KCl on those 2 days. Echo today after clinic.     RTC in 1 week.      Hurricane Season: No

## 2024-02-12 ENCOUNTER — ANTI-COAG VISIT (OUTPATIENT)
Dept: CARDIOLOGY | Facility: CLINIC | Age: 64
End: 2024-02-12
Payer: MEDICAID

## 2024-02-12 ENCOUNTER — TELEPHONE (OUTPATIENT)
Dept: TRANSPLANT | Facility: CLINIC | Age: 64
End: 2024-02-12
Payer: MEDICAID

## 2024-02-12 ENCOUNTER — LAB VISIT (OUTPATIENT)
Dept: LAB | Facility: HOSPITAL | Age: 64
End: 2024-02-12
Attending: INTERNAL MEDICINE
Payer: MEDICAID

## 2024-02-12 DIAGNOSIS — Z95.811 LVAD (LEFT VENTRICULAR ASSIST DEVICE) PRESENT: ICD-10-CM

## 2024-02-12 DIAGNOSIS — I50.9 HEART FAILURE, UNSPECIFIED: Primary | ICD-10-CM

## 2024-02-12 DIAGNOSIS — Z95.811 LVAD (LEFT VENTRICULAR ASSIST DEVICE) PRESENT: Primary | ICD-10-CM

## 2024-02-12 DIAGNOSIS — Z95.811 HEART REPLACED BY HEART ASSIST DEVICE: ICD-10-CM

## 2024-02-12 LAB
ALBUMIN SERPL BCP-MCNC: 2.8 G/DL (ref 3.5–5.2)
ALP SERPL-CCNC: 97 U/L (ref 55–135)
ALT SERPL W/O P-5'-P-CCNC: 34 U/L (ref 10–44)
ANION GAP SERPL CALC-SCNC: 13 MMOL/L (ref 8–16)
AST SERPL-CCNC: 32 U/L (ref 10–40)
BASOPHILS # BLD AUTO: 0.04 K/UL (ref 0–0.2)
BASOPHILS NFR BLD: 0.7 % (ref 0–1.9)
BILIRUB SERPL-MCNC: 0.3 MG/DL (ref 0.1–1)
BNP SERPL-MCNC: 755 PG/ML (ref 0–99)
BUN SERPL-MCNC: 76 MG/DL (ref 8–23)
CALCIUM SERPL-MCNC: 8.5 MG/DL (ref 8.7–10.5)
CHLORIDE SERPL-SCNC: 101 MMOL/L (ref 95–110)
CO2 SERPL-SCNC: 21 MMOL/L (ref 23–29)
CREAT SERPL-MCNC: 3.8 MG/DL (ref 0.5–1.4)
DIFFERENTIAL METHOD BLD: ABNORMAL
EOSINOPHIL # BLD AUTO: 0.1 K/UL (ref 0–0.5)
EOSINOPHIL NFR BLD: 1.6 % (ref 0–8)
ERYTHROCYTE [DISTWIDTH] IN BLOOD BY AUTOMATED COUNT: 17.4 % (ref 11.5–14.5)
EST. GFR  (NO RACE VARIABLE): 17 ML/MIN/1.73 M^2
GLUCOSE SERPL-MCNC: 152 MG/DL (ref 70–110)
HCT VFR BLD AUTO: 25.8 % (ref 40–54)
HGB BLD-MCNC: 8 G/DL (ref 14–18)
IMM GRANULOCYTES # BLD AUTO: 0.02 K/UL (ref 0–0.04)
IMM GRANULOCYTES NFR BLD AUTO: 0.4 % (ref 0–0.5)
INR PPP: 1.7 (ref 0.8–1.2)
LYMPHOCYTES # BLD AUTO: 0.7 K/UL (ref 1–4.8)
LYMPHOCYTES NFR BLD: 11.9 % (ref 18–48)
MCH RBC QN AUTO: 27.9 PG (ref 27–31)
MCHC RBC AUTO-ENTMCNC: 31 G/DL (ref 32–36)
MCV RBC AUTO: 90 FL (ref 82–98)
MONOCYTES # BLD AUTO: 0.5 K/UL (ref 0.3–1)
MONOCYTES NFR BLD: 7.9 % (ref 4–15)
NEUTROPHILS # BLD AUTO: 4.4 K/UL (ref 1.8–7.7)
NEUTROPHILS NFR BLD: 77.5 % (ref 38–73)
NRBC BLD-RTO: 0 /100 WBC
PLATELET # BLD AUTO: 358 K/UL (ref 150–450)
PMV BLD AUTO: 9.3 FL (ref 9.2–12.9)
POTASSIUM SERPL-SCNC: 4.7 MMOL/L (ref 3.5–5.1)
PROT SERPL-MCNC: 7.5 G/DL (ref 6–8.4)
PROTHROMBIN TIME: 17.7 SEC (ref 9–12.5)
RBC # BLD AUTO: 2.87 M/UL (ref 4.6–6.2)
SODIUM SERPL-SCNC: 135 MMOL/L (ref 136–145)
WBC # BLD AUTO: 5.71 K/UL (ref 3.9–12.7)

## 2024-02-12 PROCEDURE — 93793 ANTICOAG MGMT PT WARFARIN: CPT | Mod: ,,,

## 2024-02-12 PROCEDURE — 83880 ASSAY OF NATRIURETIC PEPTIDE: CPT | Performed by: INTERNAL MEDICINE

## 2024-02-12 PROCEDURE — 80053 COMPREHEN METABOLIC PANEL: CPT | Performed by: INTERNAL MEDICINE

## 2024-02-12 PROCEDURE — 85025 COMPLETE CBC W/AUTO DIFF WBC: CPT | Performed by: INTERNAL MEDICINE

## 2024-02-12 PROCEDURE — 85610 PROTHROMBIN TIME: CPT | Performed by: INTERNAL MEDICINE

## 2024-02-12 PROCEDURE — 36415 COLL VENOUS BLD VENIPUNCTURE: CPT | Performed by: INTERNAL MEDICINE

## 2024-02-12 RX ORDER — AMOXICILLIN 250 MG
2 CAPSULE ORAL DAILY PRN
Qty: 30 TABLET | Refills: 11 | Status: ON HOLD | OUTPATIENT
Start: 2024-02-12

## 2024-02-12 RX ORDER — SODIUM BICARBONATE 325 MG/1
325 TABLET ORAL 3 TIMES DAILY
Qty: 90 TABLET | Refills: 11 | Status: ON HOLD | OUTPATIENT
Start: 2024-02-12 | End: 2024-05-31 | Stop reason: HOSPADM

## 2024-02-12 RX ORDER — LANOLIN ALCOHOL/MO/W.PET/CERES
400 CREAM (GRAM) TOPICAL DAILY
Qty: 30 TABLET | Refills: 11 | Status: ON HOLD | OUTPATIENT
Start: 2024-02-12

## 2024-02-12 RX ORDER — GABAPENTIN 100 MG/1
100 CAPSULE ORAL 2 TIMES DAILY
Qty: 60 CAPSULE | Refills: 11 | Status: ON HOLD | OUTPATIENT
Start: 2024-02-12 | End: 2025-02-11

## 2024-02-12 NOTE — PROGRESS NOTES
[facility]    2024 1:07 PM    Assessment/Plan:    Patient presents today with subtherapeutic  INR.    Assessment of patient findings and chart review: no significant findings     Recommendation for patient's warfarin regimen: Boost dose today to 3.75mg then increase maintenance dose    Recommend repeat INR Thursday  _________________________________________________________________    Radha Abbott (63 y.o.) is followed by the [facility].    Anticoagulation Summary  As of 2024      INR goal:  2.0-3.0   TTR:  0.0 % (1.3 wk)   INR used for dosin.7 (2024)   Warfarin maintenance plan:  3.75 mg (2.5 mg x 1.5) every Sun, Tue, Thu; 2.5 mg (2.5 mg x 1) all other days   Weekly warfarin total:  21.25 mg   Plan last modified:  Jesenia Jensen, PharmD (2024)   Next INR check:  2/15/2024   Target end date:      Indications    LVAD (left ventricular assist device) present [Z95.811]                 Anticoagulation Episode Summary       INR check location:      Preferred lab:      Send INR reminders to:  [facility] COUMADIN LVAD    Comments:  [phone] [facility]          Anticoagulation Care Providers       Provider Role Specialty Phone number    Sajan Hurley MD [facility] Cardiology [phone]

## 2024-02-13 NOTE — TELEPHONE ENCOUNTER
Reviewed with patient's wife via p/c:  No more metolazone or potassium until he follows in clinic on Thursday, 2/15.    ----- Message from Marilu Ma RN sent at 2/12/2024  6:04 PM CST -----  I went ahead and called Arlyn before I left and asked her to not give Ledric anymore Metolazone until we see him in clinic on Thursday.   ----- Message -----  From: Marilu Ma RN  Sent: 2/12/2024   2:20 PM CST  To: Alva Barry RN; Marilu Ma RN; #    Walter,    Radha was given Metolazone 2.5mg to take twice weekly. He took one dose Friday and one dose this morning. He is down 10 pounds in 4 days. Cr up to 3.8, BUN up to 78 and BNP down to 755.     Are you okay with no Metolazone until we see him in clinic on Thursday?

## 2024-02-15 ENCOUNTER — OFFICE VISIT (OUTPATIENT)
Dept: TRANSPLANT | Facility: CLINIC | Age: 64
End: 2024-02-15
Attending: INTERNAL MEDICINE
Payer: MEDICAID

## 2024-02-15 ENCOUNTER — SOCIAL WORK (OUTPATIENT)
Dept: TRANSPLANT | Facility: CLINIC | Age: 64
End: 2024-02-15
Payer: MEDICAID

## 2024-02-15 ENCOUNTER — ANTI-COAG VISIT (OUTPATIENT)
Dept: CARDIOLOGY | Facility: CLINIC | Age: 64
End: 2024-02-15
Payer: MEDICAID

## 2024-02-15 ENCOUNTER — OFFICE VISIT (OUTPATIENT)
Dept: NEPHROLOGY | Facility: CLINIC | Age: 64
End: 2024-02-15
Payer: MEDICAID

## 2024-02-15 ENCOUNTER — CLINICAL SUPPORT (OUTPATIENT)
Dept: TRANSPLANT | Facility: CLINIC | Age: 64
End: 2024-02-15
Payer: MEDICAID

## 2024-02-15 ENCOUNTER — LAB VISIT (OUTPATIENT)
Dept: LAB | Facility: HOSPITAL | Age: 64
End: 2024-02-15
Payer: MEDICAID

## 2024-02-15 VITALS — HEIGHT: 72 IN | BODY MASS INDEX: 22.35 KG/M2 | TEMPERATURE: 98 F | WEIGHT: 165 LBS | SYSTOLIC BLOOD PRESSURE: 84 MMHG

## 2024-02-15 VITALS
OXYGEN SATURATION: 99 % | HEART RATE: 90 BPM | WEIGHT: 165 LBS | BODY MASS INDEX: 22.38 KG/M2 | SYSTOLIC BLOOD PRESSURE: 97 MMHG | DIASTOLIC BLOOD PRESSURE: 66 MMHG

## 2024-02-15 DIAGNOSIS — N17.9 AKI (ACUTE KIDNEY INJURY): Primary | ICD-10-CM

## 2024-02-15 DIAGNOSIS — R74.01 ELEVATED LEVELS OF TRANSAMINASE & LACTIC ACID DEHYDROGENASE: ICD-10-CM

## 2024-02-15 DIAGNOSIS — I50.42 CHRONIC COMBINED SYSTOLIC AND DIASTOLIC CONGESTIVE HEART FAILURE: ICD-10-CM

## 2024-02-15 DIAGNOSIS — Z95.811 PRESENCE OF VENTRICULAR ASSIST DEVICE: ICD-10-CM

## 2024-02-15 DIAGNOSIS — N18.32 STAGE 3B CHRONIC KIDNEY DISEASE: ICD-10-CM

## 2024-02-15 DIAGNOSIS — I48.0 PAF (PAROXYSMAL ATRIAL FIBRILLATION): ICD-10-CM

## 2024-02-15 DIAGNOSIS — Z95.811 HEART REPLACED: Primary | ICD-10-CM

## 2024-02-15 DIAGNOSIS — Z95.811 PRESENCE OF VENTRICULAR ASSIST DEVICE: Primary | ICD-10-CM

## 2024-02-15 DIAGNOSIS — N18.32 TYPE 2 DIABETES MELLITUS WITH STAGE 3B CHRONIC KIDNEY DISEASE, UNSPECIFIED WHETHER LONG TERM INSULIN USE: ICD-10-CM

## 2024-02-15 DIAGNOSIS — N18.4 CKD (CHRONIC KIDNEY DISEASE), STAGE IV: ICD-10-CM

## 2024-02-15 DIAGNOSIS — R74.02 ELEVATED LEVELS OF TRANSAMINASE & LACTIC ACID DEHYDROGENASE: ICD-10-CM

## 2024-02-15 DIAGNOSIS — Z95.811 LVAD (LEFT VENTRICULAR ASSIST DEVICE) PRESENT: Primary | ICD-10-CM

## 2024-02-15 DIAGNOSIS — N17.0 ACUTE RENAL FAILURE WITH ACUTE TUBULAR NECROSIS SUPERIMPOSED ON STAGE 3B CHRONIC KIDNEY DISEASE: Primary | ICD-10-CM

## 2024-02-15 DIAGNOSIS — E11.22 TYPE 2 DIABETES MELLITUS WITH STAGE 3B CHRONIC KIDNEY DISEASE, UNSPECIFIED WHETHER LONG TERM INSULIN USE: ICD-10-CM

## 2024-02-15 DIAGNOSIS — I47.20 VENTRICULAR TACHYCARDIA: ICD-10-CM

## 2024-02-15 DIAGNOSIS — Z74.09 IMPAIRED MOBILITY: ICD-10-CM

## 2024-02-15 DIAGNOSIS — I50.9 CONGESTIVE HEART FAILURE, UNSPECIFIED HF CHRONICITY, UNSPECIFIED HEART FAILURE TYPE: ICD-10-CM

## 2024-02-15 DIAGNOSIS — Z95.811 LVAD (LEFT VENTRICULAR ASSIST DEVICE) PRESENT: ICD-10-CM

## 2024-02-15 DIAGNOSIS — I10 HYPERTENSION, UNSPECIFIED TYPE: ICD-10-CM

## 2024-02-15 DIAGNOSIS — N18.32 ACUTE RENAL FAILURE WITH ACUTE TUBULAR NECROSIS SUPERIMPOSED ON STAGE 3B CHRONIC KIDNEY DISEASE: Primary | ICD-10-CM

## 2024-02-15 DIAGNOSIS — Z91.89 AT RISK FOR AMIODARONE TOXICITY WITH LONG TERM USE: ICD-10-CM

## 2024-02-15 DIAGNOSIS — Z79.899 AT RISK FOR AMIODARONE TOXICITY WITH LONG TERM USE: ICD-10-CM

## 2024-02-15 DIAGNOSIS — K11.8 PAROTID MASS: ICD-10-CM

## 2024-02-15 PROBLEM — R65.21 SEPTIC SHOCK: Status: RESOLVED | Noted: 2023-12-11 | Resolved: 2024-02-15

## 2024-02-15 PROBLEM — A41.9 SEPTIC SHOCK: Status: RESOLVED | Noted: 2023-12-11 | Resolved: 2024-02-15

## 2024-02-15 PROBLEM — I50.43 ACUTE ON CHRONIC COMBINED SYSTOLIC AND DIASTOLIC CHF, NYHA CLASS 4: Status: RESOLVED | Noted: 2023-10-17 | Resolved: 2024-02-15

## 2024-02-15 LAB
ALBUMIN SERPL BCP-MCNC: 2.8 G/DL (ref 3.5–5.2)
ALP SERPL-CCNC: 99 U/L (ref 55–135)
ALT SERPL W/O P-5'-P-CCNC: 62 U/L (ref 10–44)
ANION GAP SERPL CALC-SCNC: 13 MMOL/L (ref 8–16)
AST SERPL-CCNC: 54 U/L (ref 10–40)
BASOPHILS # BLD AUTO: 0.03 K/UL (ref 0–0.2)
BASOPHILS NFR BLD: 0.5 % (ref 0–1.9)
BILIRUB DIRECT SERPL-MCNC: 0.1 MG/DL (ref 0.1–0.3)
BILIRUB SERPL-MCNC: 0.2 MG/DL (ref 0.1–1)
BNP SERPL-MCNC: 730 PG/ML (ref 0–99)
BUN SERPL-MCNC: 82 MG/DL (ref 8–23)
CALCIUM SERPL-MCNC: 9.5 MG/DL (ref 8.7–10.5)
CHLORIDE SERPL-SCNC: 97 MMOL/L (ref 95–110)
CO2 SERPL-SCNC: 28 MMOL/L (ref 23–29)
CREAT SERPL-MCNC: 3.8 MG/DL (ref 0.5–1.4)
CRP SERPL-MCNC: 57.9 MG/L (ref 0–8.2)
DIFFERENTIAL METHOD BLD: ABNORMAL
EOSINOPHIL # BLD AUTO: 0.2 K/UL (ref 0–0.5)
EOSINOPHIL NFR BLD: 3.1 % (ref 0–8)
ERYTHROCYTE [DISTWIDTH] IN BLOOD BY AUTOMATED COUNT: 17.4 % (ref 11.5–14.5)
EST. GFR  (NO RACE VARIABLE): 17.3 ML/MIN/1.73 M^2
GLUCOSE SERPL-MCNC: 155 MG/DL (ref 70–110)
HCT VFR BLD AUTO: 27.2 % (ref 40–54)
HGB BLD-MCNC: 8.3 G/DL (ref 14–18)
IMM GRANULOCYTES # BLD AUTO: 0.02 K/UL (ref 0–0.04)
IMM GRANULOCYTES NFR BLD AUTO: 0.4 % (ref 0–0.5)
INR PPP: 1.8 (ref 0.8–1.2)
LDH SERPL L TO P-CCNC: 345 U/L (ref 110–260)
LYMPHOCYTES # BLD AUTO: 0.8 K/UL (ref 1–4.8)
LYMPHOCYTES NFR BLD: 14 % (ref 18–48)
MAGNESIUM SERPL-MCNC: 1.8 MG/DL (ref 1.6–2.6)
MCH RBC QN AUTO: 28.1 PG (ref 27–31)
MCHC RBC AUTO-ENTMCNC: 30.5 G/DL (ref 32–36)
MCV RBC AUTO: 92 FL (ref 82–98)
MONOCYTES # BLD AUTO: 0.4 K/UL (ref 0.3–1)
MONOCYTES NFR BLD: 7.8 % (ref 4–15)
NEUTROPHILS # BLD AUTO: 4.1 K/UL (ref 1.8–7.7)
NEUTROPHILS NFR BLD: 74.2 % (ref 38–73)
NRBC BLD-RTO: 0 /100 WBC
PHOSPHATE SERPL-MCNC: 3.3 MG/DL (ref 2.7–4.5)
PLATELET # BLD AUTO: 357 K/UL (ref 150–450)
PMV BLD AUTO: 8.9 FL (ref 9.2–12.9)
POTASSIUM SERPL-SCNC: 4.2 MMOL/L (ref 3.5–5.1)
PREALB SERPL-MCNC: 36 MG/DL (ref 20–43)
PROT SERPL-MCNC: 8 G/DL (ref 6–8.4)
PROTHROMBIN TIME: 18.5 SEC (ref 9–12.5)
RBC # BLD AUTO: 2.95 M/UL (ref 4.6–6.2)
SODIUM SERPL-SCNC: 138 MMOL/L (ref 136–145)
WBC # BLD AUTO: 5.5 K/UL (ref 3.9–12.7)

## 2024-02-15 PROCEDURE — 84100 ASSAY OF PHOSPHORUS: CPT | Performed by: INTERNAL MEDICINE

## 2024-02-15 PROCEDURE — 83880 ASSAY OF NATRIURETIC PEPTIDE: CPT | Performed by: INTERNAL MEDICINE

## 2024-02-15 PROCEDURE — 3008F BODY MASS INDEX DOCD: CPT | Mod: CPTII,,, | Performed by: INTERNAL MEDICINE

## 2024-02-15 PROCEDURE — 99214 OFFICE O/P EST MOD 30 MIN: CPT | Mod: S$PBB,,, | Performed by: INTERNAL MEDICINE

## 2024-02-15 PROCEDURE — 93750 INTERROGATION VAD IN PERSON: CPT | Mod: PBBFAC | Performed by: INTERNAL MEDICINE

## 2024-02-15 PROCEDURE — 1160F RVW MEDS BY RX/DR IN RCRD: CPT | Mod: CPTII,,, | Performed by: INTERNAL MEDICINE

## 2024-02-15 PROCEDURE — 82248 BILIRUBIN DIRECT: CPT | Performed by: INTERNAL MEDICINE

## 2024-02-15 PROCEDURE — 3044F HG A1C LEVEL LT 7.0%: CPT | Mod: CPTII,,, | Performed by: INTERNAL MEDICINE

## 2024-02-15 PROCEDURE — 99215 OFFICE O/P EST HI 40 MIN: CPT | Mod: PBBFAC,25 | Performed by: INTERNAL MEDICINE

## 2024-02-15 PROCEDURE — 83615 LACTATE (LD) (LDH) ENZYME: CPT | Performed by: INTERNAL MEDICINE

## 2024-02-15 PROCEDURE — 3078F DIAST BP <80 MM HG: CPT | Mod: CPTII,,, | Performed by: INTERNAL MEDICINE

## 2024-02-15 PROCEDURE — 1159F MED LIST DOCD IN RCRD: CPT | Mod: CPTII,,, | Performed by: INTERNAL MEDICINE

## 2024-02-15 PROCEDURE — 1111F DSCHRG MED/CURRENT MED MERGE: CPT | Mod: CPTII,,, | Performed by: INTERNAL MEDICINE

## 2024-02-15 PROCEDURE — 3074F SYST BP LT 130 MM HG: CPT | Mod: CPTII,,, | Performed by: INTERNAL MEDICINE

## 2024-02-15 PROCEDURE — 36415 COLL VENOUS BLD VENIPUNCTURE: CPT | Performed by: INTERNAL MEDICINE

## 2024-02-15 PROCEDURE — 85025 COMPLETE CBC W/AUTO DIFF WBC: CPT | Performed by: INTERNAL MEDICINE

## 2024-02-15 PROCEDURE — 99999 PR PBB SHADOW E&M-EST. PATIENT-LVL V: CPT | Mod: PBBFAC,,, | Performed by: INTERNAL MEDICINE

## 2024-02-15 PROCEDURE — 86140 C-REACTIVE PROTEIN: CPT | Performed by: INTERNAL MEDICINE

## 2024-02-15 PROCEDURE — 93750 INTERROGATION VAD IN PERSON: CPT | Mod: S$PBB,,, | Performed by: INTERNAL MEDICINE

## 2024-02-15 PROCEDURE — 85610 PROTHROMBIN TIME: CPT | Performed by: INTERNAL MEDICINE

## 2024-02-15 PROCEDURE — 3066F NEPHROPATHY DOC TX: CPT | Mod: CPTII,,, | Performed by: INTERNAL MEDICINE

## 2024-02-15 PROCEDURE — 84134 ASSAY OF PREALBUMIN: CPT | Performed by: INTERNAL MEDICINE

## 2024-02-15 PROCEDURE — 80053 COMPREHEN METABOLIC PANEL: CPT | Performed by: INTERNAL MEDICINE

## 2024-02-15 PROCEDURE — 99215 OFFICE O/P EST HI 40 MIN: CPT | Mod: PBBFAC,27,25 | Performed by: INTERNAL MEDICINE

## 2024-02-15 PROCEDURE — 83735 ASSAY OF MAGNESIUM: CPT | Performed by: INTERNAL MEDICINE

## 2024-02-15 NOTE — PROGRESS NOTES
CRISTAL contacted by CATHERINE Calle coor requesting for [facility] to be contacted to notify of new orders for line care.    CRISTAL contacted Jeni with [facility] ([facility]) to notify of same.  Jeni to access orders in [facility] and update [place] location of same.  [phone]    Addendum 2/16/23 1:51pm  Junior (Pt Care Manager) notified of pending orders for home health per KYAW Calle LVAD coor. Junior had questions about line.  CRISTAL Pacheco instructed to call Alva for further information.  Number given.

## 2024-02-15 NOTE — PROGRESS NOTES
[facility]    02/15/2024 2:58 PM    Assessment/Plan:    Patient presents today with subtherapeutic  INR.    Assessment of patient findings and chart review: no significant findings     Recommendation for patient's warfarin regimen: Boost dose today to 5mg then increase maintenance dose    Recommend repeat INR Monday  _________________________________________________________________    Radha Abbott (63 y.o.) is followed by the [facility].    Anticoagulation Summary  As of 2/15/2024      INR goal:  2.0-3.0   TTR:  0.0 % (1.7 wk)   INR used for dosin.8 (2/15/2024)   Warfarin maintenance plan:  2.5 mg (2.5 mg x 1) every Mon, Wed, Fri; 3.75 mg (2.5 mg x 1.5) all other days   Weekly warfarin total:  22.5 mg   Plan last modified:  Jesenia Jensen, PharmD (2/15/2024)   Next INR check:  2024   Target end date:      Indications    LVAD (left ventricular assist device) present [Z95.811]                 Anticoagulation Episode Summary       INR check location:      Preferred lab:      Send INR reminders to:  [facility] LVAD    Comments:  [phone] [facility]           Anticoagulation Care Providers       Provider Role Specialty Phone number    Sajan Hurley MD [facility] [phone]

## 2024-02-15 NOTE — PROGRESS NOTES
"Date of Implant with Heartmate 3 LVAD: 12/1/23    PATIENT ARRIVED IN CLINIC:  Wheelchair.  Report they use the wheelchair for long distances  Accompanied by: wife  Complaints/reason for visit today: routine    Vitals  Temperature, oral:   Temp Readings from Last 1 Encounters:   02/15/24 97.7 °F (36.5 °C) (Oral)     Blood Pressure:   BP Readings from Last 3 Encounters:   02/15/24 (!) 84/0   02/15/24 97/66   02/08/24 (!) 88/0        VAD Interrogation:      2/15/2024     1:33 PM 2/8/2024     1:39 PM 2/1/2024    12:11 PM   TXP PINA INTERROGATIONS   Type HeartMate3 HeartMate3 HeartMate3   Flow 3.6 3.6 3.7   Speed 5000 5000 5000   PI 7.6 7.6 6.7   Power (Carrington) 3.5 3.5 3.4   LSL 4600 4600 4600   Pulsatility No Pulse No Pulse No Pulse     HCT:   Lab Results   Component Value Date    HCT 27.2 (L) 02/15/2024    HCT 24 (L) 12/26/2023       Problems / Issues / Alarms with VAD if any: None noted  VAD Sounds: HM3     VAD Binder With Patient: Yes  Reviewed VAD Numbers In Binder: Yes    Equipment:  Emergency Equipment With Patient: Yes  Any Equipment Issues: None noted   It is medically necessary to ensure patient has properly functioning equipment and wearables to prevent infection, injury or death to patient.     DLES Assessment and Dressing Care:  Appearance of DLES: "1"  Antibiotics: NO  Velour: No  Manual & Visual Inspection Of Driveline: No kinks or tears noted  Stabilization Device In Use: yes, goodman securement device    Heartmate 3 Module Cable:  No yellow exposed and Attempted to unscrew modular cable to ensure it will be able to come lose in the event we ever need to change the modular cable while patient held the driveline in place so it would not move. Modular cable connection able to be unscrewed and re-tightened. Instructed pt to perform this weekly.  Frequency of Dressing Changes: every three days & silverlon kit   Pt In Need Of Management Kits?:no   It is medically necessary to have VAD management kits in order to " prevent infection or to assist in the healing of an infected DLES.       Assessment/ Quality of Life Survery:   Complaints Of Nausea / Vomiting: None noted    Appearance and Frequency Of Stools: normal and formed without blood & daily  Color Of Urine: clear/yellow  Pain: NO  Coping/Depression/Anxiety: coping okay  Sleep Habits: 8-9 hrs /night  Sleep Aids: None noted  Showering: Yes, reminded to change dressing immediately after drying off  Self- Care I have no problems with self- care  Mobility I have no problems walking about  Usual Activities I have no problems with performing my usual activities  Activity/Exercise: Home health PT/OT   Driving: Yes. Reminded to pull over should there be an alarm before looking down at controller. Wife reports we gave him permission to drive last week, but he only drove once so far and a short distance with someone in the car.       Labs:    Chemistry        Component Value Date/Time     02/15/2024 1151    K 4.2 02/15/2024 1151    CL 97 02/15/2024 1151    CO2 28 02/15/2024 1151    BUN 82 (H) 02/15/2024 1151    CREATININE 3.8 (H) 02/15/2024 1151     (H) 02/15/2024 1151        Component Value Date/Time    CALCIUM 9.5 02/15/2024 1151    ALKPHOS 99 02/15/2024 1151    AST 54 (H) 02/15/2024 1151    ALT 62 (H) 02/15/2024 1151    BILITOT 0.2 02/15/2024 1151    ESTGFRAFRICA >60.0 02/07/2018 0935    EGFRNONAA >60.0 02/07/2018 0935            Magnesium   Date Value Ref Range Status   02/15/2024 1.8 1.6 - 2.6 mg/dL Final       Lab Results   Component Value Date    WBC 5.50 02/15/2024    HGB 8.3 (L) 02/15/2024    HCT 27.2 (L) 02/15/2024    MCV 92 02/15/2024     02/15/2024       Lab Results   Component Value Date    INR 1.8 (H) 02/15/2024    INR 1.7 (H) 02/12/2024    INR 1.7 (H) 02/08/2024       BNP   Date Value Ref Range Status   02/15/2024 730 (H) 0 - 99 pg/mL Final     Comment:     Values of less than 100 pg/ml are consistent with non-CHF populations.   02/12/2024 755 (H)  0 - 99 pg/mL Final     Comment:     Values of less than 100 pg/ml are consistent with non-CHF populations.   02/08/2024 1,133 (H) 0 - 99 pg/mL Final     Comment:     Values of less than 100 pg/ml are consistent with non-CHF populations.       LD   Date Value Ref Range Status   02/15/2024 345 (H) 110 - 260 U/L Final     Comment:     Results are increased in hemolyzed samples.   02/08/2024 296 (H) 110 - 260 U/L Final     Comment:     Results are increased in hemolyzed samples.   02/01/2024 315 (H) 110 - 260 U/L Final     Comment:     Results are increased in hemolyzed samples.       Labs reviewed with patient: NO, not resulted.     Medication Reconciliation: per MA.  New Medication Detail Provided: no  Coumadin Managed by: [facility]    Education: Reviewed driveline care, emergency procedures, how to change the controller, alarms with patient, as well as discussed how to page the VAD coordinator in case of an emergency.   Educated patient/family that chest compressions are allowed in the event they are needed.    Reminded patient/caregiver not to touch their face and to cover their mouth when they cough or sneeze.   Vaccines: Pt informed that we are encouraging all VAD patients to receive the Flu and COVID vaccines and boosters. Informed pt that they can take tylenol but should avoid other NSAIDs.       Plans/Needs: Pt has appt with nephrology today at 1430.  It is currently 1415.  ENT follow up needed per snapshot: 1/9/24 (per [facility]) After D/C, patient needs outpatient ENT f/u for FNA of Parotid Mass (BL). ENT consult placed. Pt with RCW tunnelled line.  Drsg needs to be changed.  Home health going to pt house but no orders for dressinig changes.  I asked ENRRIQUE to place orders and asked social work team to sent to hh with importance of having dressing changed tomorrow.    No changes made.  Refer to MD note.  Pt will RTC in 2 weeks.     Hurricane Season: No

## 2024-02-15 NOTE — PROGRESS NOTES
Subjective:   Patient ID:  Radha Abbott is a 63 y.o. male who presents for LVAD followup visit.    Implant Date:12/1/23  Initials:LxH     Heartmate 3 RPM 5000     INR goal: 2-3   Bridge with Heparin   Antiplatelets: None d/t low H/H on implant         2/15/2024     1:33 PM   TXP PINA INTERROGATIONS   Type HeartMate3   Flow 3.6   Speed 5000   PI 7.6   Power (Carrington) 3.5   LSL 4600   Pulsatility No Pulse   Interrogation of Ventricular assist device was performed with physician analysis of device parameters and review of device function. I have personally reviewed the interrogation findings and agree with findings as stated.     HPI  64 yo BM with stage D congestive heart failure returns for follow-up visit.  He has problems with edema in his wife gave him a dose of metolazone once for weight gain and swelling since his last clinic visit on February 8th when he saw Dr. Quach.  He remains on torsemide 80 mg twice daily.  Farthest  he walks is room to room in his house.  He walks house without a walker.  Whenever he goes outside he use a walker.  Regardless his walking is limited to at most 100 ft by generalized weakness and shortness of breath.    Review of Systems   Constitutional: Positive for malaise/fatigue.   HENT:  Positive for hoarse voice (this has improved).    Cardiovascular:  Positive for dyspnea on exertion, leg swelling and orthopnea (sleeping on 1-2 pillows). Negative for near-syncope, palpitations, paroxysmal nocturnal dyspnea and syncope.   Hematologic/Lymphatic: Does not bruise/bleed easily.   Neurological:  Positive for weakness. Negative for dizziness and light-headedness.       Objective:   Blood pressure (!) 84/0, temperature 97.7 °F (36.5 °C), temperature source Oral, height 6' (1.829 m), weight 74.8 kg (165 lb).body mass index is 22.38 kg/m².  Doppler: 84  Physical Exam  Constitutional:       General: He is not in acute distress.     Appearance: He is well-developed. He is not ill-appearing,  toxic-appearing or diaphoretic.      Comments: BP (!) 84/0 (BP Location: Right arm, Patient Position: Sitting) Comment (BP Method): doppler  Temp 97.7 °F (36.5 °C) (Oral)   Ht 6' (1.829 m)   Wt 74.8 kg (165 lb)   BMI 22.38 kg/m²   Friendly, chronically ill-appearing black male in no acute distress   HENT:      Head: Normocephalic and atraumatic.   Eyes:      General: No scleral icterus.        Right eye: No discharge.         Left eye: No discharge.      Conjunctiva/sclera: Conjunctivae normal.   Neck:      Thyroid: No thyromegaly.      Vascular: No JVD.      Trachea: No tracheal deviation.      Comments: I feel rounded soft, freely movable mass near right jaw line (?lipoma); I cannot feel the parotid mass noted on CT    I can not visualize JVP in the sitting position and could not get him onto the exam table  Cardiovascular:      Comments: Normal LVAD sounds; DL 1  Pulmonary:      Effort: Pulmonary effort is normal. No respiratory distress.      Breath sounds: Normal breath sounds. No wheezing or rales.   Abdominal:      General: Bowel sounds are normal. There is no distension.      Palpations: Abdomen is soft. There is no mass.      Tenderness: There is no abdominal tenderness. There is no guarding or rebound.   Musculoskeletal:         General: Swelling present. No tenderness.   Skin:     General: Skin is warm and dry.   Neurological:      General: No focal deficit present.      Mental Status: He is alert. Mental status is at baseline.   Psychiatric:         Mood and Affect: Mood normal.         Behavior: Behavior normal.         Thought Content: Thought content normal.         Judgment: Judgment normal.       Lab Results   Component Value Date     (H) 02/15/2024     (H) 02/12/2024    BNP 1,133 (H) 02/08/2024     Lab Results   Component Value Date     (H) 02/15/2024     02/15/2024    K 4.2 02/15/2024    MG 1.8 02/15/2024    CL 97 02/15/2024    CO2 28 02/15/2024    PHOS 3.3  02/15/2024    BUN 82 (H) 02/15/2024    CREATININE 3.8 (H) 02/15/2024     (H) 02/15/2024    HGBA1C 5.1 01/13/2024    AST 54 (H) 02/15/2024    ALT 62 (H) 02/15/2024    ALBUMIN 2.8 (L) 02/15/2024    PROT 8.0 02/15/2024    BILITOT 0.2 02/15/2024    WBC 5.50 02/15/2024    HGB 8.3 (L) 02/15/2024    HCT 27.2 (L) 02/15/2024    HCT 24 (L) 12/26/2023     02/15/2024    INR 1.8 (H) 02/15/2024     (H) 02/15/2024    TSH 1.381 11/15/2023    CHOL 107 (L) 11/15/2023    HDL 49 11/15/2023    LDLCALC 43.2 (L) 11/15/2023    TRIG 310 (H) 12/08/2023 1/5/2024 CT soft tissue neck  1. Mildly enlarged left supraclavicular lymph node, nonspecific.  Metastatic disease is not excluded.  2. Right upper extremity PICC line malpositioned with tip directed superiorly within a small branch of the right internal jugular vein.  Recommend repositioning.  3. 2.2 cm right parotid mass.  This could represent benign or malignant primary or metastatic disease.  ENT follow-up recommended.  4. Enlarging left pleural effusion.  This report was flagged in Epic as abnormal.    Assessment:      1. Presence of ventricular assist device    2. CKD (chronic kidney disease), stage IV    3. Chronic combined systolic and diastolic congestive heart failure    4. Parotid mass    5. Ventricular tachycardia    6. PAF (paroxysmal atrial fibrillation)    7. Elevated levels of transaminase & lactic acid dehydrogenase    8. At risk for amiodarone toxicity with long term use    9. Impaired mobility        Plan:   Consult ENT for parotid mass noted on CT scan and ask them to check the soft swelling near the mandible on the right though I suspect this is a lipoma.  The good news is that his hoarseness improved after the injection of his vocal cords.    Transaminase enzymes were elevated and he is on amiodarone 400 mg daily.  Repeat these enzymes in 4 weeks and assure that he has follow-up appointment with EP    Supplies ordered are medically necessary for  care of LVAD and DL    Post LVAD goals of care are to feel stronger and be more active.      Patient is now NYHA III  Recommend 2 gram sodium restriction and 1500cc fluid restriction.  Encourage physical activity with graded exercise program.  Requested patient to weigh themselves daily, and to notify us if their weight increases by more than 3 lbs in 1 day or 5 lbs in 1 week.     Listed for transplant: No    UNOS Patient Status  Functional Status: 50% - Requires considerable assistance and frequent medical care  Physical Capacity: Limited Mobility  Working for Income: No  If no, reason not working: Disability

## 2024-02-15 NOTE — PROGRESS NOTES
"HPI  This 63 y.o. y/o male with PMH of ischemic cardiomyopathy with most recent TTE showing EF 10 -15% and G3DD s/p LVAD, CAD s/p x3 vessel CABG back in 2009, type 2 diabetes mellitus (most recent hemoglobin A1c 7.6%), hypertension, HLD, history of ventricular fibrillation for which he is on amiodarone came in for IVÁN on CKD s/p SLED -> iHD post hospital follow up.    Renal history  Creatinine   Date Value Ref Range Status   02/15/2024 3.8 (H) 0.5 - 1.4 mg/dL Final   02/12/2024 3.8 (H) 0.5 - 1.4 mg/dL Final   02/08/2024 2.8 (H) 0.5 - 1.4 mg/dL Final   02/06/2024 3.1 (H) 0.5 - 1.4 mg/dL Final   02/01/2024 3.5 (H) 0.5 - 1.4 mg/dL Final   01/24/2024 3.1 (H) 0.5 - 1.4 mg/dL Final   01/23/2024 3.3 (H) 0.5 - 1.4 mg/dL Final   01/22/2024 3.3 (H) 0.5 - 1.4 mg/dL Final   01/21/2024 3.4 (H) 0.5 - 1.4 mg/dL Final   01/20/2024 3.6 (H) 0.5 - 1.4 mg/dL Final   Taking torsemide 40 mg BID, added metolazone after dc on 2/6/2024, was held 2/15/2024(today) per Cardiology  S/p SLED-> iHD due to IVÁN 2/2 cardiogenic shock, now off  Cr around 2.2 in 2023 prior to the admission  No results found for: "UTPCR"  Has been drinking 40 oz of fluid a day  Not taking NSAIDs    HTN  On LVAD  Current medication: hydralazine 50 mg Q8H, torsemide 40 mg BID, on amiodarone 400 mg daily; metolazone currently held  Has been compliant with low salt diet  ->177 Ibs, metolazone was added, now weight down to 165 Ibs; metolazone was held    Hx of DM  Now diet control  Lab Results   Component Value Date    HGBA1C 5.1 01/13/2024       Past Medical History:   Diagnosis Date    CAD (coronary artery disease)     Diabetes mellitus     HFrEF (heart failure with reduced ejection fraction)     ICD (implantable cardioverter-defibrillator) in place     MI, old        Past Surgical History:   Procedure Laterality Date    ANGIOPLASTY-VENOUS ARTERY Right 12/1/2023    Procedure: ANGIOPLASTY-VENOUS ARTERY, RIGHT FEMORAL;  Surgeon: Yuri Washington MD;  Location: [facility] OR " 2ND FLR;  Service: Cardiovascular;  Laterality: Right;    AORTIC VALVULOPLASTY N/A 12/1/2023    Procedure: REPAIR, AORTIC VALVE;  Surgeon: Yuri Washington MD;  Location: [facility] OR 2ND FLR;  Service: Cardiovascular;  Laterality: N/A;    CARDIAC SURGERY      CLOSURE N/A 12/1/2023    Procedure: CLOSURE, TEMPORARY;  Surgeon: Yuri Washington MD;  Location: [facility] OR 2ND FLR;  Service: Cardiovascular;  Laterality: N/A;    DRAINAGE OF PLEURAL EFFUSION  12/4/2023    Procedure: DRAINAGE, PLEURAL EFFUSION;  Surgeon: Yuri Washington MD;  Location: [facility] OR 2ND FLR;  Service: Cardiovascular;;    INSERTION OF GRAFT TO PERICARDIUM  12/4/2023    Procedure: INSERTION, GRAFT, PERICARDIUM;  Surgeon: Yuri Washington MD;  Location: [facility] OR 2ND FLR;  Service: Cardiovascular;;    INSERTION OF INTRA-AORTIC BALLOON ASSIST DEVICE Right 11/21/2023    Procedure: INSERTION, INTRA-AORTIC BALLOON PUMP;  Surgeon: Finn Cohn MD;  Location: [facility] CATH LAB;  Service: Cardiology;  Laterality: Right;    LEFT VENTRICULAR ASSIST DEVICE Left 12/1/2023    Procedure: INSERTION-LEFT VENTRICULAR ASSIST DEVICE;  Surgeon: Yuri Washington MD;  Location: [facility] OR [facility] FLR;  Service: Cardiovascular;  Laterality: Left;  REDO STERNOTOMY - REDO SAW NEEDED FOR CASE    LYSIS OF ADHESIONS  12/1/2023    Procedure: LYSIS, ADHESIONS;  Surgeon: Yuri Washington MD;  [facility];  Service: Cardiovascular;;    PLACEMENT OF SWAN ROLANDO CATHETER WITH IMAGING GUIDANCE  11/20/2023    Procedure: INSERTION, CATHETER, SWAN-ROLANDO, WITH IMAGING GUIDANCE;  Surgeon: Sajan Hurley MD;  Location: [facility] CATH LAB;  Service: Cardiology;;    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 12/1/2023    Procedure: REPAIR, ANEURYSM, ARTERY, FEMORAL;  Surgeon: Yuri Washington MD;  Location: [facility] OR 2ND FLR;  Service: Cardiovascular;  Laterality: Right;  Right Femoral Artery Repair    RIGHT HEART CATHETERIZATION Right 10/10/2023    Procedure: INSERTION, CATHETER, RIGHT HEART;   Surgeon: Bin Gandhi MD;  Location: [facility] CATH LAB;  Service: Cardiology;  Laterality: Right;    RIGHT HEART CATHETERIZATION Right 10/13/2023    Procedure: INSERTION, CATHETER, RIGHT HEART;  Surgeon: Walter Mcintyre MD;  Location: [facility] CATH LAB;  Service: Cardiology;  Laterality: Right;    RIGHT HEART CATHETERIZATION  11/13/2023    RIGHT HEART CATHETERIZATION Right 11/13/2023    Procedure: INSERTION, CATHETER, RIGHT HEART;  Surgeon: Juventino Bermudez Jr., MD;  Location: [facility] CATH LAB;  Service: Cardiology;  Laterality: Right;    RIGHT HEART CATHETERIZATION Right 11/20/2023    Procedure: INSERTION, CATHETER, RIGHT HEART;  Surgeon: Sajan Hurley MD;  Location: [facility] CATH LAB;  Service: Cardiology;  Laterality: Right;    RIGHT HEART CATHETERIZATION Right 1/22/2024    Procedure: INSERTION, CATHETER, RIGHT HEART;  Surgeon: Brayan Ocampo MD;  Location: [facility] CATH LAB;  Service: Cardiology;  Laterality: Right;    STERNAL WOUND CLOSURE N/A 12/4/2023    Procedure: CLOSURE, WOUND, STERNUM;  Surgeon: Yuri Washington MD;  [facility];  Service: Cardiovascular;  Laterality: N/A;    STERNOTOMY N/A 12/1/2023    Procedure: STERNOTOMY, REDO;  Surgeon: Yuri Washington MD;  [facility];  Service: Cardiovascular;  Laterality: N/A;    VALVULOPLASTY, MITRAL VALVE N/A 12/1/2023    Procedure: VALVULOPLASTY, MITRAL VALVE;  Surgeon: Yuri Washington MD;  [facility];  Service: Cardiovascular;  Laterality: N/A;       Review of patient's allergies indicates:  No Known Allergies      Social History     Socioeconomic History    Marital status:    Tobacco Use    Smoking status: Every Day     Current packs/day: 0.50     Types: Cigarettes   Substance and Sexual Activity    Alcohol use: Yes     Comment: rarely    Drug use: No     Social Determinants of Health     Financial Resource Strain: Low Risk  (10/27/2023)    Overall Financial Resource Strain (CARDIA)     Difficulty of Paying  Living Expenses: Not hard at all   Food Insecurity: No Food Insecurity (10/27/2023)    Hunger Vital Sign     Worried About Running Out of Food in the Last Year: Never true     Ran Out of Food in the Last Year: Never true   Transportation Needs: No Transportation Needs (10/27/2023)    PRAPARE - Transportation     Lack of Transportation (Medical): No     Lack of Transportation (Non-Medical): No   Physical Activity: Inactive (10/27/2023)    Exercise Vital Sign     Days of Exercise per Week: 0 days     Minutes of Exercise per Session: 0 min   Stress: No Stress Concern Present (10/27/2023)    [place] [employer] - Occupational Stress Questionnaire     Feeling of Stress : Not at all   Social Connections: Moderately Integrated (10/27/2023)    Social Connection and Isolation Panel [NHANES]     Frequency of Communication with Friends and Family: More than three times a week     Frequency of Social Gatherings with Friends and Family: More than three times a week     Attends Cheondoism Services: More than 4 times per year     Active Member of Clubs or Organizations: No     Attends Club or Organization Meetings: Never     Marital Status:    Housing Stability: Low Risk  (10/27/2023)    Housing Stability Vital Sign     Unable to Pay for Housing in the Last Year: No     Number of Places Lived in the Last Year: 1     Unstable Housing in the Last Year: No       No family history on file.    ROS negative except listed above    PHYSICAL EXAMINATION:  Blood pressure 97/66, pulse 90, weight 74.8 kg (165 lb), SpO2 99 %.  Constitutional - No acute distress  HEENT - Grossly normal  Neck - supple.   Cardiovascular - JVP mild distended 8 cm likely chronic  Respiratory - Mild basal rales  Musculoskeletal - Peripheral edema trace  Dermatologic/Skin - Skin warm and dry.  No rashes.    Neurologic - No acute neurological deficit  Psychiatric - AAOx3    Assessment and Plan  1. Acute kidney injury s/p RRT 01/2024, now  "off      CKD Stage 3B-4 prior to hospitalization:     Urine Protein Creatinine Ratios:    No results found for: "UTPCR"      Acid-Base:   Lab Results   Component Value Date     02/15/2024    K 4.2 02/15/2024    CO2 28 02/15/2024     -Counseled to avoid NSAIDs  -Counseled to drink 40 oz a day  -Will repeat renal function in a week. Expect Cr to decrease to the 2.0s after holding metolazone  -Consult Dr. Aguilar for TDC removal (will continue warfarin, but will inform the patient regarding greater bleeding risk)    2. HTN: BP goal 130/80 mmHg  -Counseled for low salt diet  -Continue hydralazine 50 mg Q8H, torsemide 40 mg BID, on amiodarone 400 mg daily; metolazone currently held  -Will defer the diuretics to Cardiology. Likely will benefit from setting a target weight and dose diuretics based on that (target weight 167-170 Ib per the patient's wife).    3. Bone mineral condition:   Lab Results   Component Value Date    CALCIUM 9.5 02/15/2024    CALCIUM 8.5 (L) 02/12/2024    CAION 1.16 01/23/2024    CAION 1.20 01/21/2024    PHOS 3.3 02/15/2024    PHOS 3.1 02/08/2024     Vit D, 25-Hydroxy   Date Value Ref Range Status   12/29/2023 23 (L) 30 - 96 ng/mL Final     Comment:     Vitamin D deficiency.........<10 ng/mL                              Vitamin D insufficiency......10-29 ng/mL       Vitamin D sufficiency........> or equal to 30 ng/mL  Vitamin D toxicity............>100 ng/mL        Will continue to monitor    4. Anemia:   Lab Results   Component Value Date    HGB 8.3 (L) 02/15/2024    FERRITIN 4,749 (H) 01/05/2024    IRON 25 (L) 01/05/2024    TRANSFERRIN 184 (L) 01/05/2024    TIBC 272 01/05/2024    FESATURATED 9 (L) 01/05/2024   High ferritin, contraindicated for iron therapy  Will consider EPO injection next time    5. Hx of DM:  Last HbA1C   Lab Results   Component Value Date    HGBA1C 5.1 01/13/2024   Diet control  Counseled the patient regarding the importance of sugar control to slow CKD progression "       6. HFrEF & HFpEF  Counseled the patient to watch and keep the weight stable as unstable fluid status can affect the kidney function.      ESRD planing when eGFR < 15   Will consider txp referral when eGFR is chronically < 20 (ongoing cardiac txp evaluation)  Will consider CKD education class referral when the eGFR < 20    Follow up in 3 weeks

## 2024-02-15 NOTE — PLAN OF CARE
[facility]   Heart Transplant/VAD Clinic   [address] [place] [address]   [phone] after hours          [phone] fax     VAD HOME  HEALTH ORDERS      Admit to Home Health    Diagnosis:   Patient Active Problem List   Diagnosis    Ventricular tachycardia    CAD (coronary artery disease)    HFrEF (heart failure with reduced ejection fraction)    Type 2 diabetes mellitus without complication, without long-term current use of insulin    PAF (paroxysmal atrial fibrillation)    Ventricular fibrillation    Acute on chronic combined systolic and diastolic CHF, NYHA class 4    Defibrillator discharge    Pre-transplant evaluation for heart transplant    Palliative care encounter    Advance care planning    LVAD (left ventricular assist device) present    Abnormal CXR    Acute encephalopathy    Congestive heart failure    Depressed mood    Moderate malnutrition    Dysphonia    Unilateral complete vocal fold paralysis    Oliguria    Shortness of breath    Adjustment disorder with depressed mood    Deep tissue injury    Blood blister    Lymphadenopathy    At high risk for skin breakdown    Right parotid mass    Impaired mobility    Anemia due to chronic kidney disease, on chronic dialysis       Patient is homebound due to:  NYHA Class IV HF. S/P LVAD placement.     Diet: Low Fat, Low cholesterol, 2Gm Na, Coumadin restrictions.    Acitivities: No Swimming, bathing, vacuuming, contact sports.    Fresh implants= Sternal Precautions    Nursing:   SN to complete comprehensive assessment including routine vital signs. Instruct on disease process and s/s of complications to report to MD. Review/verify medication list sent home with the patient at time of discharge  and instruct patient/caregiver as needed. Frequency may be adjusted depending on start of care date.    **LVAD driveline exit site dressing change is to be completed per LVAD patient/caregiver only**.    Notify  MD if:  SBP > 120 or < 80;   MAP > 80 or < 65;   HR > 120 or < 60;   Temp > 101;   Weight gain >3lbs in 1 day or 5lbs in 1 week.    LABS:  SN to perform labs: PT/INR per Coumadin clinic [phone].     HOME INFUSION THERAPY:  SN to perform Infusion Therapy/Central Line Care.  Review Central Line Care & Central Line Flush with patient.    PICC/Central line care:  Scrub the Hub: Prior to accessing the line, always perform a 30 second alcohol scrub  Each lumen of the central line is to be flushed at least daily with 10 mL Normal Saline and 3 mL Heparin flush (100 units/mL)  Skilled Nurse (SN) may draw blood from IV access  Blood Draw Procedure:   - Aspirate at least 5 mL of blood   - Discard   - Obtain specimen   - Change posiflow cap   - Flush with 20 mL Normal Saline followed by a                 3-5 mL Heparin flush (100 units/mL)  :   - Sterile dressing changes are done weekly and as needed.   - Use chlor-hexadine scrub to cleanse site, apply Biopatch to insertion site,       apply securement device dressing   - Posi-flow caps are changed weekly and after EVERY lab draw.   - If sterile gauze is under dressing to control oozing,                 dressing change must be performed every 24 hours until gauze is not needed.    Send initial Home Health orders to [facility] attending physician on call.  Send follow up questions to VAD clinic MD [phone] or [phone].

## 2024-02-15 NOTE — LETTER
February 15, 2024        Kevin Franklin  [address] [place] [address]  Phone: [phone]  Fax: [phone]             [id number]  [address]  [facility] [address]  Phone: [phone]   Patient: Radha Abbott   MR Number: [id number]   YOB: 1960   Date of Visit: 2/15/2024       Dear Dr. Kevin Franklin    Thank you for referring Radha Abbott to me for evaluation. Attached you will find relevant portions of my assessment and plan of care.    If you have questions, please do not hesitate to call me. I look forward to following Radha Abbott along with you.    Sincerely,    Juventino Bermudez Jr, MD    Enclosure    If you would like to receive this communication electronically, please contact [email or web address] or [phone] to request [employer] access.    [employer] Link is a tool which provides read-only access to select patient information with whom you have a relationship. Its easy to use and provides real time access to review your patients record including encounter summaries, notes, results, and demographic information.    If you feel you have received this communication in error or would no longer like to receive these types of communications, please e-mail [email or web address]

## 2024-02-16 ENCOUNTER — DOCUMENTATION ONLY (OUTPATIENT)
Dept: TRANSPLANT | Facility: HOSPITAL | Age: 64
End: 2024-02-16
Payer: MEDICAID

## 2024-02-16 ENCOUNTER — TELEPHONE (OUTPATIENT)
Dept: NEPHROLOGY | Facility: CLINIC | Age: 64
End: 2024-02-16
Payer: MEDICAID

## 2024-02-16 ENCOUNTER — TELEPHONE (OUTPATIENT)
Dept: TRANSPLANT | Facility: CLINIC | Age: 64
End: 2024-02-16
Payer: MEDICAID

## 2024-02-16 DIAGNOSIS — Z01.812 PRE-PROCEDURE LAB EXAM: Primary | ICD-10-CM

## 2024-02-16 RX ORDER — SODIUM CHLORIDE 9 MG/ML
INJECTION, SOLUTION INTRAVENOUS CONTINUOUS
Status: CANCELLED | OUTPATIENT
Start: 2024-02-16 | End: 2024-02-16

## 2024-02-16 RX ORDER — SODIUM CHLORIDE 0.9 % (FLUSH) 0.9 %
10 SYRINGE (ML) INJECTION
Status: DISCONTINUED | OUTPATIENT
Start: 2024-02-16 | End: 2024-02-16 | Stop reason: HOSPADM

## 2024-02-16 NOTE — PROGRESS NOTES
[facility]   Heart Transplant/VAD Clinic   [address] [place] [address]   [phone] after hours          [phone] fax     VAD HOME  HEALTH ORDERS      Admit to Home Health    Diagnosis:   Patient Active Problem List   Diagnosis    Ventricular tachycardia    CAD (coronary artery disease)    HFrEF (heart failure with reduced ejection fraction)    Type 2 diabetes mellitus without complication, without long-term current use of insulin    PAF (paroxysmal atrial fibrillation)    Ventricular fibrillation    Defibrillator discharge    Pre-transplant evaluation for heart transplant    Palliative care encounter    Advance care planning    LVAD (left ventricular assist device) present    Abnormal CXR    Acute encephalopathy    Congestive heart failure    Depressed mood    Moderate malnutrition    Dysphonia    Unilateral complete vocal fold paralysis    Oliguria    Shortness of breath    Adjustment disorder with depressed mood    Deep tissue injury    Blood blister    Lymphadenopathy    At high risk for skin breakdown    Right parotid mass    Impaired mobility    Anemia due to chronic kidney disease, on chronic dialysis    At risk for amiodarone toxicity with long term use    CKD (chronic kidney disease), stage IV       Patient is homebound due to:  NYHA Class IV HF. S/P LVAD placement.     Diet: Low Fat, Low cholesterol, 2Gm Na, Coumadin restrictions.    Acitivities: No Swimming, bathing, vacuuming, contact sports.    Fresh implants= Sternal Precautions    Nursing:   SN to complete comprehensive assessment including routine vital signs. Instruct on disease process and s/s of complications to report to MD. Review/verify medication list sent home with the patient at time of discharge  and instruct patient/caregiver as needed. Frequency may be adjusted depending on start of care date.    **LVAD driveline exit site dressing change is to be completed per LVAD  patient/caregiver only**.    Notify MD if:  SBP > 120 or < 80;   MAP > 80 or < 65;   HR > 120 or < 60;   Temp > 101;   Weight gain >3lbs in 1 day or 5lbs in 1 week.    LABS:  SN to perform labs: PT/INR per Coumadin clinic [phone].   Follow up INR date: 2/19/24  No Finger Sticks    HOME INFUSION THERAPY:    SN to perform Infusion Therapy/Central Line Care.  Review Central Line Care & Central Line Flush with patient.    PICC/Central line care:  Scrub the Hub: Prior to accessing the line, always perform a 30 second alcohol scrub  Each lumen of the central line is to be flushed at least daily with 10 mL Normal Saline and 3 mL Heparin flush (100 units/mL)  Skilled Nurse (SN) may draw blood from IV access  Blood Draw Procedure:   - Aspirate at least 5 mL of blood   - Discard   - Obtain specimen   - Change posiflow cap   - Flush with 20 mL Normal Saline followed by a                 3-5 mL Heparin flush (100 units/mL)  :   - Sterile dressing changes are done weekly and as needed.   - Use chlor-hexadine scrub to cleanse site, apply Biopatch to insertion site,       apply securement device dressing   - Posi-flow caps are changed weekly and after EVERY lab draw.   - If sterile gauze is under dressing to control oozing,                 dressing change must be performed every 24 hours until gauze is not needed.    CONSULTS:      Physical Therapy to evaluate and treat. Evaluate for home safety and equipment needs; Establish/upgrade home exercise program. Perform / instruct on therapeutic exercises, gait training, transfer training, and Range of Motion.    Occupational Therapy to evaluate and treat. Evaluate home environment for safety and equipment needs. Perform/Instruct on transfers, ADL training, ROM, and therapeutic exercises.    Send initial Home Health orders to [facility] attending physician on call.  Send follow up questions to VAD clinic MD [phone] or [phone].

## 2024-02-16 NOTE — PROCEDURES
2/15/2024     1:33 PM 2/8/2024     1:39 PM 2/1/2024    12:11 PM 1/24/2024     8:00 AM 1/24/2024     4:57 AM 1/23/2024    11:53 PM 1/23/2024     8:37 PM   TXP PINA INTERROGATIONS   Type HeartMate3 HeartMate3 HeartMate3       Flow 3.6 3.6 3.7       Speed 5000 5000 5000       PI 7.6 7.6 6.7       Power ([place]) 3.5 3.5 3.4       LSL 4600 4600 4600       Pulsatility No Pulse No Pulse No Pulse No Pulse Intermittent pulse No Pulse Pulse   }Interrogation of Ventricular assist device was performed with physician analysis of device parameters and review of device function. I have personally reviewed the interrogation findings and agree with findings as stated.

## 2024-02-19 ENCOUNTER — LAB VISIT (OUTPATIENT)
Dept: LAB | Facility: HOSPITAL | Age: 64
End: 2024-02-19
Attending: INTERNAL MEDICINE
Payer: MEDICAID

## 2024-02-19 ENCOUNTER — TELEPHONE (OUTPATIENT)
Dept: NEPHROLOGY | Facility: CLINIC | Age: 64
End: 2024-02-19
Payer: MEDICAID

## 2024-02-19 ENCOUNTER — ANTI-COAG VISIT (OUTPATIENT)
Dept: CARDIOLOGY | Facility: CLINIC | Age: 64
End: 2024-02-19
Payer: MEDICAID

## 2024-02-19 DIAGNOSIS — N18.4 CHRONIC KIDNEY DISEASE, STAGE 4 (SEVERE): Primary | ICD-10-CM

## 2024-02-19 DIAGNOSIS — Z01.812 PRE-PROCEDURE LAB EXAM: ICD-10-CM

## 2024-02-19 DIAGNOSIS — Z95.811 LVAD (LEFT VENTRICULAR ASSIST DEVICE) PRESENT: Primary | ICD-10-CM

## 2024-02-19 DIAGNOSIS — Z95.811 PRESENCE OF VENTRICULAR ASSIST DEVICE: ICD-10-CM

## 2024-02-19 LAB
ALBUMIN SERPL BCP-MCNC: 3 G/DL (ref 3.5–5.2)
ALP SERPL-CCNC: 116 U/L (ref 55–135)
ALT SERPL W/O P-5'-P-CCNC: 83 U/L (ref 10–44)
ANION GAP SERPL CALC-SCNC: 12 MMOL/L (ref 8–16)
APTT PPP: 41.5 SEC (ref 21–32)
AST SERPL-CCNC: 62 U/L (ref 10–40)
BILIRUB SERPL-MCNC: 0.2 MG/DL (ref 0.1–1)
BUN SERPL-MCNC: 86 MG/DL (ref 8–23)
CALCIUM SERPL-MCNC: 8.9 MG/DL (ref 8.7–10.5)
CHLORIDE SERPL-SCNC: 97 MMOL/L (ref 95–110)
CO2 SERPL-SCNC: 25 MMOL/L (ref 23–29)
CREAT SERPL-MCNC: 4.1 MG/DL (ref 0.5–1.4)
EST. GFR  (NO RACE VARIABLE): 16 ML/MIN/1.73 M^2
GLUCOSE SERPL-MCNC: 223 MG/DL (ref 70–110)
INR PPP: 2 (ref 0.8–1.2)
POTASSIUM SERPL-SCNC: 4.3 MMOL/L (ref 3.5–5.1)
PROT SERPL-MCNC: 8.4 G/DL (ref 6–8.4)
PROTHROMBIN TIME: 20.9 SEC (ref 9–12.5)
SODIUM SERPL-SCNC: 134 MMOL/L (ref 136–145)

## 2024-02-19 PROCEDURE — 85610 PROTHROMBIN TIME: CPT | Performed by: INTERNAL MEDICINE

## 2024-02-19 PROCEDURE — 85730 THROMBOPLASTIN TIME PARTIAL: CPT | Performed by: INTERNAL MEDICINE

## 2024-02-19 PROCEDURE — 93793 ANTICOAG MGMT PT WARFARIN: CPT | Mod: ,,,

## 2024-02-19 PROCEDURE — 80053 COMPREHEN METABOLIC PANEL: CPT | Performed by: INTERNAL MEDICINE

## 2024-02-19 PROCEDURE — 36415 COLL VENOUS BLD VENIPUNCTURE: CPT | Performed by: INTERNAL MEDICINE

## 2024-02-19 NOTE — PROGRESS NOTES
[facility]    2024 1:18 PM    Assessment/Plan:    Patient presents today with therapeutic INR.    Assessment of patient findings and chart review: Catheter removal to be r/s due to increasing Scr    Recommendation for patient's warfarin regimen: Continue current maintenance dose    Recommend repeat INR Thursday  _________________________________________________________________    Radha Abbott (63 y.o.) is followed by the [facility].    Anticoagulation Summary  As of 2024      INR goal:  2.0-3.0   TTR:  0.0 % (2.3 wk)   INR used for dosin.0 (2024)   Warfarin maintenance plan:  2.5 mg (2.5 mg x 1) every Mon, Wed, Fri; 3.75 mg (2.5 mg x 1.5) all other days   Weekly warfarin total:  22.5 mg   Plan last modified:  Jesenia Jensen, PharmD (2/15/2024)   Next INR check:  2024   Target end date:      Indications    LVAD (left ventricular assist device) present [Z95.811]                 Anticoagulation Episode Summary       INR check location:      Preferred lab:      Send INR reminders to:  [facility] COUMADIN LVAD    Comments:  [phone] [facility] of           Anticoagulation Care Providers       Provider Role Specialty Phone number    Sajan Hurley MD [facility] Cardiology [phone]

## 2024-02-19 NOTE — H&P (VIEW-ONLY)
BUN/Cr 86/4.1 on repeat lab. Will keep the TDC in since he likely will need RRT in the near future. Will refer the patient for CKD education class and refer to the surgeon after the treatment modality decided.

## 2024-02-20 ENCOUNTER — TELEPHONE (OUTPATIENT)
Dept: TRANSPLANT | Facility: CLINIC | Age: 64
End: 2024-02-20
Payer: MEDICAID

## 2024-02-20 ENCOUNTER — TELEPHONE (OUTPATIENT)
Dept: NEPHROLOGY | Facility: CLINIC | Age: 64
End: 2024-02-20
Payer: MEDICAID

## 2024-02-20 NOTE — TELEPHONE ENCOUNTER
Labs were reviewed by Dr. Mendez who has scheduled a repeat for Thursday. Dr. Mendez is managing kidney function at this time.       ----- Message from Alva Barry RN sent at 2/19/2024  4:54 PM CST -----  He saw Nephrology last week.  They were going to remove his tunneled line if his labs were stable.  We've been talking on a secure chat and I asked if any reason to make changes but no answer so far.    Shashank saw him last week.  BNP was down to 730.  No JVD noted, pt wasn't able to get on exam table.      ----- Message -----  From: Walter Mcintyre MD  Sent: 2/19/2024   3:35 PM CST  To: Alva Barry RN; Marilu Ma RN; #    I would say no changes to his diuretics at this time unless any CHF symptoms or signs of hypovolemia (orthostatic light headedness). His CVP was still elevated on his last RHC a month ago. It looks like his kidneys took a hit during the VAD implant and have never quite fully recovered. Is he seeing nephrology?    Walter    ----- Message -----  From: Alva Barry RN  Sent: 2/19/2024   2:10 PM CST  To: Marilu Ma, [place]AW; Ashanti Duncan RN; #    Hi Dr. Mcintyre,     Pt repeat BUN and CR are elevated to 86/4.1.  He is taking torsemide  80 mg BID.  He did take Metolazone 2 weeks ago but we instructed them not to take any more.  What should we do for his BUN/ Cr?     Thank you,  Alva

## 2024-02-22 ENCOUNTER — LAB VISIT (OUTPATIENT)
Dept: LAB | Facility: HOSPITAL | Age: 64
End: 2024-02-22
Attending: INTERNAL MEDICINE
Payer: MEDICAID

## 2024-02-22 ENCOUNTER — ANTI-COAG VISIT (OUTPATIENT)
Dept: CARDIOLOGY | Facility: CLINIC | Age: 64
End: 2024-02-22
Payer: MEDICAID

## 2024-02-22 ENCOUNTER — TELEPHONE (OUTPATIENT)
Dept: ELECTROPHYSIOLOGY | Facility: CLINIC | Age: 64
End: 2024-02-22
Payer: MEDICAID

## 2024-02-22 DIAGNOSIS — Z95.811 LVAD (LEFT VENTRICULAR ASSIST DEVICE) PRESENT: Primary | ICD-10-CM

## 2024-02-22 DIAGNOSIS — I47.20 VENTRICULAR TACHYCARDIA: Primary | ICD-10-CM

## 2024-02-22 DIAGNOSIS — Z95.811 PRESENCE OF VENTRICULAR ASSIST DEVICE: ICD-10-CM

## 2024-02-22 DIAGNOSIS — N17.0 ACUTE RENAL FAILURE WITH ACUTE TUBULAR NECROSIS SUPERIMPOSED ON STAGE 3B CHRONIC KIDNEY DISEASE: ICD-10-CM

## 2024-02-22 DIAGNOSIS — N18.32 ACUTE RENAL FAILURE WITH ACUTE TUBULAR NECROSIS SUPERIMPOSED ON STAGE 3B CHRONIC KIDNEY DISEASE: ICD-10-CM

## 2024-02-22 LAB
ALBUMIN SERPL BCP-MCNC: 3.1 G/DL (ref 3.5–5.2)
ALBUMIN SERPL BCP-MCNC: 3.1 G/DL (ref 3.5–5.2)
ALP SERPL-CCNC: 100 U/L (ref 55–135)
ALT SERPL W/O P-5'-P-CCNC: 72 U/L (ref 10–44)
ANION GAP SERPL CALC-SCNC: 15 MMOL/L (ref 8–16)
ANION GAP SERPL CALC-SCNC: 15 MMOL/L (ref 8–16)
AST SERPL-CCNC: 46 U/L (ref 10–40)
BASOPHILS # BLD AUTO: 0.05 K/UL (ref 0–0.2)
BASOPHILS NFR BLD: 0.8 % (ref 0–1.9)
BILIRUB SERPL-MCNC: 0.2 MG/DL (ref 0.1–1)
BUN SERPL-MCNC: 87 MG/DL (ref 8–23)
BUN SERPL-MCNC: 87 MG/DL (ref 8–23)
CALCIUM SERPL-MCNC: 9.6 MG/DL (ref 8.7–10.5)
CALCIUM SERPL-MCNC: 9.6 MG/DL (ref 8.7–10.5)
CHLORIDE SERPL-SCNC: 98 MMOL/L (ref 95–110)
CHLORIDE SERPL-SCNC: 98 MMOL/L (ref 95–110)
CO2 SERPL-SCNC: 23 MMOL/L (ref 23–29)
CO2 SERPL-SCNC: 23 MMOL/L (ref 23–29)
CREAT SERPL-MCNC: 3.8 MG/DL (ref 0.5–1.4)
CREAT SERPL-MCNC: 3.8 MG/DL (ref 0.5–1.4)
DIFFERENTIAL METHOD BLD: ABNORMAL
EOSINOPHIL # BLD AUTO: 0.1 K/UL (ref 0–0.5)
EOSINOPHIL NFR BLD: 2 % (ref 0–8)
ERYTHROCYTE [DISTWIDTH] IN BLOOD BY AUTOMATED COUNT: 16.7 % (ref 11.5–14.5)
EST. GFR  (NO RACE VARIABLE): 17 ML/MIN/1.73 M^2
EST. GFR  (NO RACE VARIABLE): 17 ML/MIN/1.73 M^2
GLUCOSE SERPL-MCNC: 122 MG/DL (ref 70–110)
GLUCOSE SERPL-MCNC: 122 MG/DL (ref 70–110)
HCT VFR BLD AUTO: 29.1 % (ref 40–54)
HGB BLD-MCNC: 9.2 G/DL (ref 14–18)
IMM GRANULOCYTES # BLD AUTO: 0.04 K/UL (ref 0–0.04)
IMM GRANULOCYTES NFR BLD AUTO: 0.7 % (ref 0–0.5)
INR PPP: 1.9 (ref 0.8–1.2)
LYMPHOCYTES # BLD AUTO: 0.9 K/UL (ref 1–4.8)
LYMPHOCYTES NFR BLD: 14.8 % (ref 18–48)
MCH RBC QN AUTO: 28.5 PG (ref 27–31)
MCHC RBC AUTO-ENTMCNC: 31.6 G/DL (ref 32–36)
MCV RBC AUTO: 90 FL (ref 82–98)
MONOCYTES # BLD AUTO: 0.5 K/UL (ref 0.3–1)
MONOCYTES NFR BLD: 8 % (ref 4–15)
NEUTROPHILS # BLD AUTO: 4.5 K/UL (ref 1.8–7.7)
NEUTROPHILS NFR BLD: 73.7 % (ref 38–73)
NRBC BLD-RTO: 0 /100 WBC
PHOSPHATE SERPL-MCNC: 4.4 MG/DL (ref 2.7–4.5)
PLATELET # BLD AUTO: 390 K/UL (ref 150–450)
PMV BLD AUTO: 9.1 FL (ref 9.2–12.9)
POTASSIUM SERPL-SCNC: 4.5 MMOL/L (ref 3.5–5.1)
POTASSIUM SERPL-SCNC: 4.5 MMOL/L (ref 3.5–5.1)
PROT SERPL-MCNC: 8.2 G/DL (ref 6–8.4)
PROTHROMBIN TIME: 20.9 SEC (ref 9–12.5)
PTH-INTACT SERPL-MCNC: 187.4 PG/ML (ref 9–77)
RBC # BLD AUTO: 3.23 M/UL (ref 4.6–6.2)
SODIUM SERPL-SCNC: 136 MMOL/L (ref 136–145)
SODIUM SERPL-SCNC: 136 MMOL/L (ref 136–145)
WBC # BLD AUTO: 6.09 K/UL (ref 3.9–12.7)

## 2024-02-22 PROCEDURE — 80069 RENAL FUNCTION PANEL: CPT | Performed by: INTERNAL MEDICINE

## 2024-02-22 PROCEDURE — 85025 COMPLETE CBC W/AUTO DIFF WBC: CPT | Performed by: INTERNAL MEDICINE

## 2024-02-22 PROCEDURE — 83970 ASSAY OF PARATHORMONE: CPT | Performed by: INTERNAL MEDICINE

## 2024-02-22 PROCEDURE — 85610 PROTHROMBIN TIME: CPT | Performed by: INTERNAL MEDICINE

## 2024-02-22 PROCEDURE — 80053 COMPREHEN METABOLIC PANEL: CPT | Performed by: INTERNAL MEDICINE

## 2024-02-22 PROCEDURE — 93793 ANTICOAG MGMT PT WARFARIN: CPT | Mod: ,,,

## 2024-02-22 PROCEDURE — 36415 COLL VENOUS BLD VENIPUNCTURE: CPT | Performed by: INTERNAL MEDICINE

## 2024-02-22 NOTE — PROGRESS NOTES
[facility]    2024 10:48 AM    Assessment/Plan:    Patient presents today with subtherapeutic  INR.    Assessment of patient findings and chart review: no significant findings     Recommendation for patient's warfarin regimen: Boost dose today to 5mg then resume current maintenance dose    Recommend repeat INR Monday  _________________________________________________________________    Radha Abbott (63 y.o.) is followed by the [facility].    Anticoagulation Summary  As of 2024      INR goal:  2.0-3.0   TTR:  0.0 % (2.7 wk)   INR used for dosin.9 (2024)   Warfarin maintenance plan:  2.5 mg (2.5 mg x 1) every Mon, Wed, Fri; 3.75 mg (2.5 mg x 1.5) all other days   Weekly warfarin total:  22.5 mg   Plan last modified:  Jesenia Jensen, PharmD (2/15/2024)   Next INR check:  2024   Target end date:      Indications    LVAD (left ventricular assist device) present [Z95.811]                 Anticoagulation Episode Summary       INR check location:      Preferred lab:      Send INR reminders to:  [facility] COUMADIN LVAD    Comments:  [phone] [facility] of           Anticoagulation Care Providers       Provider Role Specialty Phone number    Sajan Hurley MD [facility] Cardiology [phone]            Patient Findings       Negatives:  Signs/symptoms of thrombosis, Signs/symptoms of bleeding, Laboratory test error suspected, Change in health, Change in alcohol use, Change in activity, Upcoming invasive procedure, Emergency department visit, Upcoming dental procedure, Missed doses, Extra doses, Change in medications, Change in diet/appetite, Hospital admission, Bruising, Other complaints    Comments:  Pt confirmed correct dosing per calendar. No changes to diet or mediations reported. No other issues noted.

## 2024-02-22 NOTE — PROGRESS NOTES
Pt confirmed correct dosing per calendar. No changes to diet or mediations reported. No other issues noted.

## 2024-02-23 ENCOUNTER — TELEPHONE (OUTPATIENT)
Dept: NEPHROLOGY | Facility: CLINIC | Age: 64
End: 2024-02-23
Payer: MEDICAID

## 2024-02-23 DIAGNOSIS — Z01.812 PRE-PROCEDURE LAB EXAM: Primary | ICD-10-CM

## 2024-02-23 NOTE — TELEPHONE ENCOUNTER
Read the following instructions to pt.'s spouse. Informed to arrive 530am ON 3/1 and for pt to stop Warfarin on the day prior to the removal of the dialysis catheter. Apptm made with her for pt to have PT/PTT on 2/29. Informed to take BP meds the morning of the procedure at home and to bring meds with him on the procedure date. Otherwise, instructed NPO after MN except sip of water with his meds.       Hemodialysis catheter removal:  · Please stay in bed for 6 hours and avoid any muscular activity.  · Keep the head elevated 45 degrees in the first 6 hours at home and preferably overnight.   · Keep the dressing for 48 hours.  · Avoid using showed or bathtub to prevent water from coming over the site for 48 hours.   · The dressing can be removed after 48 hours.   · If you felt mild to moderate pa

## 2024-02-26 ENCOUNTER — LAB VISIT (OUTPATIENT)
Dept: LAB | Facility: HOSPITAL | Age: 64
End: 2024-02-26
Attending: INTERNAL MEDICINE
Payer: MEDICAID

## 2024-02-26 ENCOUNTER — ANTI-COAG VISIT (OUTPATIENT)
Dept: CARDIOLOGY | Facility: CLINIC | Age: 64
End: 2024-02-26
Payer: MEDICAID

## 2024-02-26 DIAGNOSIS — Z95.811 LVAD (LEFT VENTRICULAR ASSIST DEVICE) PRESENT: Primary | ICD-10-CM

## 2024-02-26 DIAGNOSIS — I48.20 CHRONIC ATRIAL FIBRILLATION: ICD-10-CM

## 2024-02-26 DIAGNOSIS — I50.43 ACUTE ON CHRONIC COMBINED SYSTOLIC AND DIASTOLIC HEART FAILURE: Primary | ICD-10-CM

## 2024-02-26 LAB
ALBUMIN SERPL BCP-MCNC: 3 G/DL (ref 3.5–5.2)
ALP SERPL-CCNC: 92 U/L (ref 55–135)
ALT SERPL W/O P-5'-P-CCNC: 52 U/L (ref 10–44)
ANION GAP SERPL CALC-SCNC: 12 MMOL/L (ref 8–16)
AST SERPL-CCNC: 30 U/L (ref 10–40)
BASOPHILS # BLD AUTO: 0.03 K/UL (ref 0–0.2)
BASOPHILS NFR BLD: 0.5 % (ref 0–1.9)
BILIRUB SERPL-MCNC: 0.2 MG/DL (ref 0.1–1)
BNP SERPL-MCNC: 683 PG/ML (ref 0–99)
BUN SERPL-MCNC: 80 MG/DL (ref 8–23)
CALCIUM SERPL-MCNC: 8.7 MG/DL (ref 8.7–10.5)
CHLORIDE SERPL-SCNC: 102 MMOL/L (ref 95–110)
CO2 SERPL-SCNC: 22 MMOL/L (ref 23–29)
CREAT SERPL-MCNC: 3.9 MG/DL (ref 0.5–1.4)
DIFFERENTIAL METHOD BLD: ABNORMAL
EOSINOPHIL # BLD AUTO: 0.1 K/UL (ref 0–0.5)
EOSINOPHIL NFR BLD: 1.8 % (ref 0–8)
ERYTHROCYTE [DISTWIDTH] IN BLOOD BY AUTOMATED COUNT: 16.6 % (ref 11.5–14.5)
EST. GFR  (NO RACE VARIABLE): 17 ML/MIN/1.73 M^2
GLUCOSE SERPL-MCNC: 143 MG/DL (ref 70–110)
HCT VFR BLD AUTO: 24.8 % (ref 40–54)
HGB BLD-MCNC: 8 G/DL (ref 14–18)
IMM GRANULOCYTES # BLD AUTO: 0.03 K/UL (ref 0–0.04)
IMM GRANULOCYTES NFR BLD AUTO: 0.5 % (ref 0–0.5)
LYMPHOCYTES # BLD AUTO: 0.8 K/UL (ref 1–4.8)
LYMPHOCYTES NFR BLD: 13.7 % (ref 18–48)
MCH RBC QN AUTO: 28.5 PG (ref 27–31)
MCHC RBC AUTO-ENTMCNC: 32.3 G/DL (ref 32–36)
MCV RBC AUTO: 88 FL (ref 82–98)
MONOCYTES # BLD AUTO: 0.4 K/UL (ref 0.3–1)
MONOCYTES NFR BLD: 7 % (ref 4–15)
NEUTROPHILS # BLD AUTO: 4.4 K/UL (ref 1.8–7.7)
NEUTROPHILS NFR BLD: 76.5 % (ref 38–73)
NRBC BLD-RTO: 0 /100 WBC
PLATELET # BLD AUTO: 333 K/UL (ref 150–450)
PMV BLD AUTO: 9.7 FL (ref 9.2–12.9)
POTASSIUM SERPL-SCNC: 4.2 MMOL/L (ref 3.5–5.1)
PROT SERPL-MCNC: 7.2 G/DL (ref 6–8.4)
RBC # BLD AUTO: 2.81 M/UL (ref 4.6–6.2)
SODIUM SERPL-SCNC: 136 MMOL/L (ref 136–145)
WBC # BLD AUTO: 5.7 K/UL (ref 3.9–12.7)

## 2024-02-26 PROCEDURE — 36415 COLL VENOUS BLD VENIPUNCTURE: CPT | Performed by: INTERNAL MEDICINE

## 2024-02-26 PROCEDURE — 80053 COMPREHEN METABOLIC PANEL: CPT | Performed by: INTERNAL MEDICINE

## 2024-02-26 PROCEDURE — 93793 ANTICOAG MGMT PT WARFARIN: CPT | Mod: ,,,

## 2024-02-26 PROCEDURE — 85025 COMPLETE CBC W/AUTO DIFF WBC: CPT | Performed by: INTERNAL MEDICINE

## 2024-02-26 PROCEDURE — 83880 ASSAY OF NATRIURETIC PEPTIDE: CPT | Performed by: INTERNAL MEDICINE

## 2024-02-26 NOTE — PROGRESS NOTES
[facility]    2024 1:26 PM    Assessment/Plan:    Patient presents today with therapeutic INR.    Assessment of patient findings and chart review: Need to start trending down for line removal on 3/1 - target INR <1.8    Recommendation for patient's warfarin regimen:  Lower dose tomorrow to 1.25mg    Recommend repeat INR Wednesday  _________________________________________________________________    [facility] (63 y.o.) is followed by the [facility].    Anticoagulation Summary  As of 2024      INR goal:  2.0-3.0   TTR:  0.0 % (3.3 wk)   INR used for dosin.0 (2024)   Warfarin maintenance plan:  2.5 mg (2.5 mg x 1) every Mon, Wed, Fri; 3.75 mg (2.5 mg x 1.5) all other days   Weekly warfarin total:  22.5 mg   Plan last modified:  Jesenia Jensen, PharmD (2/15/2024)   Next INR check:  2024   Target end date:      Indications    LVAD (left ventricular assist device) present [Z95.811]                 Anticoagulation Episode Summary       INR check location:      Preferred lab:      Send INR reminders to:  [facility] COUMADIN LVAD    Comments:  [phone] [facility] of           Anticoagulation Care Providers       Provider Role Specialty Phone number    Sajan Hurley MD [facility] Cardiology [phone]

## 2024-02-28 ENCOUNTER — LAB VISIT (OUTPATIENT)
Dept: LAB | Facility: HOSPITAL | Age: 64
End: 2024-02-28
Attending: INTERNAL MEDICINE
Payer: MEDICAID

## 2024-02-28 ENCOUNTER — PATIENT MESSAGE (OUTPATIENT)
Dept: CARDIOTHORACIC SURGERY | Facility: CLINIC | Age: 64
End: 2024-02-28
Payer: MEDICAID

## 2024-02-28 DIAGNOSIS — Z01.812 PRE-PROCEDURE LAB EXAM: ICD-10-CM

## 2024-02-28 DIAGNOSIS — Z95.811 LVAD (LEFT VENTRICULAR ASSIST DEVICE) PRESENT: Primary | ICD-10-CM

## 2024-02-28 PROCEDURE — 85730 THROMBOPLASTIN TIME PARTIAL: CPT | Performed by: INTERNAL MEDICINE

## 2024-02-28 PROCEDURE — 85610 PROTHROMBIN TIME: CPT | Performed by: INTERNAL MEDICINE

## 2024-02-28 PROCEDURE — 36415 COLL VENOUS BLD VENIPUNCTURE: CPT | Performed by: INTERNAL MEDICINE

## 2024-02-29 ENCOUNTER — ANTI-COAG VISIT (OUTPATIENT)
Dept: CARDIOLOGY | Facility: CLINIC | Age: 64
End: 2024-02-29
Payer: MEDICAID

## 2024-02-29 ENCOUNTER — OFFICE VISIT (OUTPATIENT)
Dept: TRANSPLANT | Facility: CLINIC | Age: 64
End: 2024-02-29
Payer: MEDICAID

## 2024-02-29 ENCOUNTER — TELEPHONE (OUTPATIENT)
Dept: NEPHROLOGY | Facility: CLINIC | Age: 64
End: 2024-02-29
Payer: MEDICAID

## 2024-02-29 ENCOUNTER — HOSPITAL ENCOUNTER (OUTPATIENT)
Dept: PULMONOLOGY | Facility: CLINIC | Age: 64
Discharge: HOME | End: 2024-02-29
Payer: MEDICAID

## 2024-02-29 ENCOUNTER — CLINICAL SUPPORT (OUTPATIENT)
Dept: TRANSPLANT | Facility: CLINIC | Age: 64
End: 2024-02-29
Payer: MEDICAID

## 2024-02-29 ENCOUNTER — OFFICE VISIT (OUTPATIENT)
Dept: OTOLARYNGOLOGY | Facility: CLINIC | Age: 64
End: 2024-02-29
Payer: MEDICAID

## 2024-02-29 VITALS
SYSTOLIC BLOOD PRESSURE: 90 MMHG | HEIGHT: 72 IN | TEMPERATURE: 98 F | WEIGHT: 174 LBS | BODY MASS INDEX: 23.57 KG/M2 | HEART RATE: 85 BPM

## 2024-02-29 VITALS — HEART RATE: 88 BPM | DIASTOLIC BLOOD PRESSURE: 81 MMHG | SYSTOLIC BLOOD PRESSURE: 106 MMHG

## 2024-02-29 VITALS — HEIGHT: 72 IN | WEIGHT: 168 LBS | BODY MASS INDEX: 22.75 KG/M2

## 2024-02-29 DIAGNOSIS — Z79.899 AT RISK FOR AMIODARONE TOXICITY WITH LONG TERM USE: ICD-10-CM

## 2024-02-29 DIAGNOSIS — Z01.812 PRE-PROCEDURAL LABORATORY EXAMINATION: Primary | ICD-10-CM

## 2024-02-29 DIAGNOSIS — Z95.811 LVAD (LEFT VENTRICULAR ASSIST DEVICE) PRESENT: ICD-10-CM

## 2024-02-29 DIAGNOSIS — N18.4 CKD (CHRONIC KIDNEY DISEASE), STAGE IV: ICD-10-CM

## 2024-02-29 DIAGNOSIS — I50.9 HEART FAILURE, UNSPECIFIED HF CHRONICITY, UNSPECIFIED HEART FAILURE TYPE: Primary | ICD-10-CM

## 2024-02-29 DIAGNOSIS — I50.20 HFREF (HEART FAILURE WITH REDUCED EJECTION FRACTION): Primary | ICD-10-CM

## 2024-02-29 DIAGNOSIS — E11.9 TYPE 2 DIABETES MELLITUS WITHOUT COMPLICATION, WITHOUT LONG-TERM CURRENT USE OF INSULIN: ICD-10-CM

## 2024-02-29 DIAGNOSIS — K11.8 PAROTID MASS: Primary | ICD-10-CM

## 2024-02-29 DIAGNOSIS — Z91.89 AT RISK FOR AMIODARONE TOXICITY WITH LONG TERM USE: ICD-10-CM

## 2024-02-29 DIAGNOSIS — I50.84 END STAGE HEART FAILURE: ICD-10-CM

## 2024-02-29 DIAGNOSIS — Z95.811 LVAD (LEFT VENTRICULAR ASSIST DEVICE) PRESENT: Primary | ICD-10-CM

## 2024-02-29 LAB
APTT PPP: 39.6 SEC (ref 21–32)
INR PPP: 2.4 (ref 0.8–1.2)
PROTHROMBIN TIME: 24.1 SEC (ref 9–12.5)

## 2024-02-29 PROCEDURE — 3074F SYST BP LT 130 MM HG: CPT | Mod: CPTII,,, | Performed by: OTOLARYNGOLOGY

## 2024-02-29 PROCEDURE — 99999 PR PBB SHADOW E&M-EST. PATIENT-LVL III: CPT | Mod: PBBFAC,,, | Performed by: OTOLARYNGOLOGY

## 2024-02-29 PROCEDURE — 3044F HG A1C LEVEL LT 7.0%: CPT | Mod: CPTII,,, | Performed by: INTERNAL MEDICINE

## 2024-02-29 PROCEDURE — 3008F BODY MASS INDEX DOCD: CPT | Mod: CPTII,,, | Performed by: INTERNAL MEDICINE

## 2024-02-29 PROCEDURE — 3066F NEPHROPATHY DOC TX: CPT | Mod: CPTII,,, | Performed by: OTOLARYNGOLOGY

## 2024-02-29 PROCEDURE — 1159F MED LIST DOCD IN RCRD: CPT | Mod: CPTII,,, | Performed by: OTOLARYNGOLOGY

## 2024-02-29 PROCEDURE — 3044F HG A1C LEVEL LT 7.0%: CPT | Mod: CPTII,,, | Performed by: OTOLARYNGOLOGY

## 2024-02-29 PROCEDURE — 94618 PULMONARY STRESS TESTING: CPT | Mod: PBBFAC | Performed by: INTERNAL MEDICINE

## 2024-02-29 PROCEDURE — 99214 OFFICE O/P EST MOD 30 MIN: CPT | Mod: S$PBB,,, | Performed by: INTERNAL MEDICINE

## 2024-02-29 PROCEDURE — 3079F DIAST BP 80-89 MM HG: CPT | Mod: CPTII,,, | Performed by: OTOLARYNGOLOGY

## 2024-02-29 PROCEDURE — 3066F NEPHROPATHY DOC TX: CPT | Mod: CPTII,,, | Performed by: INTERNAL MEDICINE

## 2024-02-29 PROCEDURE — 93793 ANTICOAG MGMT PT WARFARIN: CPT | Mod: ,,,

## 2024-02-29 PROCEDURE — 99213 OFFICE O/P EST LOW 20 MIN: CPT | Mod: PBBFAC | Performed by: OTOLARYNGOLOGY

## 2024-02-29 PROCEDURE — 99999 PR PBB SHADOW E&M-EST. PATIENT-LVL IV: CPT | Mod: PBBFAC,,, | Performed by: INTERNAL MEDICINE

## 2024-02-29 PROCEDURE — 99214 OFFICE O/P EST MOD 30 MIN: CPT | Mod: S$PBB,,, | Performed by: OTOLARYNGOLOGY

## 2024-02-29 PROCEDURE — 99214 OFFICE O/P EST MOD 30 MIN: CPT | Mod: PBBFAC,25,27 | Performed by: INTERNAL MEDICINE

## 2024-02-29 PROCEDURE — 1160F RVW MEDS BY RX/DR IN RCRD: CPT | Mod: CPTII,,, | Performed by: OTOLARYNGOLOGY

## 2024-02-29 NOTE — ED NOTES
Bed: 33  Expected date:   Expected time:   Means of arrival:   Comments:  10   0 (no pain/absence of nonverbal indicators of pain)

## 2024-02-29 NOTE — ASSESSMENT & PLAN NOTE
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  Given its stability, I do not feel that he necessarily needs a biopsy.  Also, he is wary of proceed with biopsy as he is getting over his recent hospital admission.  I think that close observation with serial imaging is a reasonable choice given the totality of circumstances.  Will set up ultrasound of the neck in July of this year.

## 2024-02-29 NOTE — PROGRESS NOTES
[facility]    2024 2:52 PM    Assessment/Plan:    Patient presents today with therapeutic INR.    Assessment of patient findings and chart review: INR trended up despite lowering dose earlier this week; needs INR <1.8 for tunneled catheter removal tomorrow    Recommendation for patient's warfarin regimen:  Hold dose today    Recommend repeat INR tomorrow  _________________________________________________________________    [facility] (63 y.o.) is followed by the [facility].    Anticoagulation Summary  As of 2024      INR goal:  2.0-3.0   TTR:  11.2 % (3.7 wk)   INR used for dosin.3 (2024)   Warfarin maintenance plan:  2.5 mg (2.5 mg x 1) every Mon, Wed, Fri; 3.75 mg (2.5 mg x 1.5) all other days   Weekly warfarin total:  22.5 mg   Plan last modified:  Jesenia Jensen, PharmD (2/15/2024)   Next INR check:  3/1/2024   Target end date:      Indications    LVAD (left ventricular assist device) present [Z95.811]                 Anticoagulation Episode Summary       INR check location:      Preferred lab:      Send INR reminders to:  [facility] COUMADIN LVAD    Comments:  [facility] [phone], F;[phone]          Anticoagulation Care Providers       Provider Role Specialty Phone number    Sajan Hurley MD [facility] Cardiology [phone]

## 2024-02-29 NOTE — PROGRESS NOTES
2/29/24 Sil with [facility] called to report INR dated 2/28 is still in process--not resulted yet

## 2024-02-29 NOTE — LETTER
March 11, 2024        Kevin Franklin  [address] [place] [address]  Phone: [phone]  Fax: [phone]             [facility]  [address]  [facility] [address]  Phone: [phone]   Patient: Radha Abbott   MR Number: [id number]   YOB: 1960   Date of Visit: 2/29/2024       Dear Dr. Kevin Franklin    Thank you for referring Radha Abbott to me for evaluation. Attached you will find relevant portions of my assessment and plan of care.    If you have questions, please do not hesitate to call me. I look forward to following Radha Abbott along with you.    Sincerely,    Sandhya Price MD    Enclosure    If you would like to receive this communication electronically, please contact [email or web address] or [phone] to request [employer] [place] [employer] access.    [employer] [place] [employer] is a tool which provides read-only access to select patient information with whom you have a relationship. Its easy to use and provides real time access to review your patients record including encounter summaries, notes, results, and demographic information.    If you feel you have received this communication in error or would no longer like to receive these types of communications, please e-mail [email or web address]

## 2024-02-29 NOTE — PROGRESS NOTES
Date of Implant with Heartmate 3 LVAD: 12/1/23    PATIENT ARRIVED IN CLINIC:  Ambulatory/rollator  Accompanied by: wife  Complaints/reason for visit today: routine    Vitals  Temperature, oral:   Temp Readings from Last 1 Encounters:   02/29/24 97.8 °F (36.6 °C) (Oral)     Blood Pressure:   BP Readings from Last 3 Encounters:   02/29/24 (!) 90/0   02/29/24 106/81   02/15/24 (!) 84/0        VAD Interrogation:      2/29/2024     3:01 PM 2/15/2024     1:33 PM 2/8/2024     1:39 PM   TXP PINA INTERROGATIONS   Type HeartMate3 HeartMate3 HeartMate3   Flow 3.7 3.6 3.6   Speed 5000 5000 5000   PI 6.4 7.6 7.6   Power ([place]) 3.5 3.5 3.5   LSL 4600 4600 4600   Pulsatility No Pulse No Pulse No Pulse     HCT:   Lab Results   Component Value Date    HCT 28.5 (L) 02/29/2024    HCT 24 (L) 12/26/2023       Problems / Issues / Alarms with VAD if any: None noted  VAD Sounds: HM3 Smooth    VAD Binder With Patient: Yes  Reviewed VAD Numbers In Binder: Yes    Equipment:  Emergency Equipment With Patient: Yes  Any Equipment Issues: None noted   It is medically necessary to ensure patient has properly functioning equipment and wearables to prevent infection, injury or death to patient.     DLES Assessment and Dressing Care:  Appearance of DLES: 1  Antibiotics: NO  Velour: No  Manual & Visual Inspection Of Driveline: No kinks or tears noted  Stabilization Device In Use: yes, goodman securement device    Heartmate 3 Module Cable:  No yellow exposed and Attempted to unscrew modular cable to ensure it will be able to come lose in the event we ever need to change the modular cable while patient held the driveline in place so it would not move. Modular cable connection able to be unscrewed and re-tightened. Instructed pt to perform this weekly.  Frequency of Dressing Changes:  moved out to weekly on today  & silverlon kit   Pt In Need Of Management Kits?:no   It is medically necessary to have VAD management kits in order to prevent infection or to  assist in the healing of an infected DLES.    Patient MyChart Questionnaire:        No data to display                 Assessment/ Quality of Life [place]:   Complaints Of Nausea / Vomiting: None noted    Appearance and Frequency Of Stools: normal and formed without blood & daily  Color Of Urine: clear/yellow  Pain: NO  Coping/Depression/Anxiety: coping okay  Sleep Habits: 8 hrs /night  Sleep Aids: None noted  Showering: Yes, reminded to change dressing immediately after drying off  Self- Care I have no problems with self- care  Mobility I have some problems in walking about  Usual Activities I have some problems with performing my usual activities  Activity/Exercise: home therapy   Driving: No.  Additional Comments: n/a    Labs:    Chemistry        Component Value Date/Time     02/29/2024 1022    K 4.9 02/29/2024 1022    CL 99 02/29/2024 1022    CO2 27 02/29/2024 1022    BUN 76 (H) 02/29/2024 1022    CREATININE 3.5 (H) 02/29/2024 1022     (H) 02/29/2024 1022        Component Value Date/Time    CALCIUM 9.3 02/29/2024 1022    ALKPHOS 99 02/29/2024 1022    AST 31 02/29/2024 1022    ALT 51 (H) 02/29/2024 1022    BILITOT 0.3 02/29/2024 1022    ESTGFRAFRICA >60.0 02/07/2018 0935    EGFRNONAA >60.0 02/07/2018 0935            Magnesium   Date Value Ref Range Status   02/29/2024 2.0 1.6 - 2.6 mg/dL Final       Lab Results   Component Value Date    WBC 7.00 02/29/2024    HGB 8.8 (L) 02/29/2024    HCT 28.5 (L) 02/29/2024    MCV 93 02/29/2024     02/29/2024       Lab Results   Component Value Date    INR 2.3 (H) 02/29/2024    INR 2.0 (H) 02/26/2024    INR 1.9 (H) 02/22/2024       BNP   Date Value Ref Range Status   02/29/2024 733 (H) 0 - 99 pg/mL Final     Comment:     Values of less than 100 pg/ml are consistent with non-CHF populations.   02/26/2024 683 (H) 0 - 99 pg/mL Final     Comment:     Values of less than 100 pg/ml are consistent with non-CHF populations.   02/15/2024 730 (H) 0 - 99 pg/mL Final      Comment:     Values of less than 100 pg/ml are consistent with non-CHF populations.       LD   Date Value Ref Range Status   02/29/2024 316 (H) 110 - 260 U/L Final     Comment:     Results are increased in hemolyzed samples.   02/15/2024 345 (H) 110 - 260 U/L Final     Comment:     Results are increased in hemolyzed samples.   02/08/2024 296 (H) 110 - 260 U/L Final     Comment:     Results are increased in hemolyzed samples.       Labs reviewed with patient: YES     Medication Reconciliation: per MA.  New Medication Detail Provided: no  Coumadin Managed by: [facility]    Education: Reviewed driveline care, emergency procedures, how to change the controller, alarms with patient, as well as discussed how to page the VAD coordinator in case of an emergency.   Educated patient/family that chest compressions are allowed in the event they are needed.    Reminded patient/caregiver not to touch their face and to cover their mouth when they cough or sneeze.   Vaccines: Pt informed that we are encouraging all VAD patients to receive the Flu and COVID vaccines and boosters. Informed pt that they can take tylenol but should avoid other NSAIDs.       Plans/Needs: Routine f/u. Patient saw ENT today for parotid mass. He is having HD line removed tomorrow. Pushed out to weekly dressing changes from every 3 days. Discharged patient from  and referred to Cardiac Rehab. Message Arrhythmia to ask if patient can be seen in  instead of main campus.     RTC in 1 month.     Hurricane Season: No

## 2024-02-29 NOTE — PROGRESS NOTES
Chief Complaint   Patient presents with    f/u for parotid mass       HPI   63 y.o. male presents for evaluation of a right-sided parotid mass.  He reports this mass has been present for approximately 5 years and has not changed.  He reports a previous biopsy though I am not able to identify it in our system.  No complaints today.  He is an LVAD patient.  He underwent injection laryngoplasty during recent hospitalization but has not yet had follow-up in the [facility].    Review of Systems   Constitutional: Negative for fatigue and unexpected weight change.   HENT: Per HPI.  Eyes: Negative for visual disturbance.   Respiratory: Negative for shortness of breath, hemoptysis   Cardiovascular: Negative for chest pain and palpitations.   Musculoskeletal: Negative for decreased ROM, back pain.   Skin: Negative for rash, sunburn, itching.   Neurological: Negative for dizziness and seizures.   Hematological: Negative for adenopathy. Does not bruise/bleed easily.   Endocrine: Negative for rapid weight loss/weight gain, heat/cold intolerance.     Past Medical History   Patient Active Problem List   Diagnosis    Ventricular tachycardia    CAD (coronary artery disease)    HFrEF (heart failure with reduced ejection fraction)    Type 2 diabetes mellitus without complication, without long-term current use of insulin    PAF (paroxysmal atrial fibrillation)    Ventricular fibrillation    Defibrillator discharge    Pre-transplant evaluation for heart transplant    Palliative care encounter    Advance care planning    LVAD (left ventricular assist device) present    Abnormal CXR    Acute encephalopathy    Congestive heart failure    Depressed mood    Moderate malnutrition    Dysphonia    Unilateral complete vocal fold paralysis    Oliguria    Shortness of breath    Adjustment disorder with depressed mood    Deep tissue injury    Blood blister    Lymphadenopathy    At high risk for skin breakdown    Right parotid mass    Impaired  mobility    Anemia due to chronic kidney disease, on chronic dialysis    At risk for amiodarone toxicity with long term use    CKD (chronic kidney disease), stage IV           Past Surgical History   Past Surgical History:   Procedure Laterality Date    ANGIOPLASTY-VENOUS ARTERY Right 12/1/2023    Procedure: ANGIOPLASTY-VENOUS ARTERY, RIGHT FEMORAL;  Surgeon: Yuri Washington MD;  Location: [facility] OR [facility] FLR;  Service: Cardiovascular;  Laterality: Right;    AORTIC VALVULOPLASTY N/A 12/1/2023    Procedure: REPAIR, AORTIC VALVE;  Surgeon: Yuri Washington MD;  Location: [facility] OR [facility] FLR;  Service: Cardiovascular;  Laterality: N/A;    CARDIAC SURGERY      CLOSURE N/A 12/1/2023    Procedure: CLOSURE, TEMPORARY;  Surgeon: Yuri Washington MD;  Location: [facility] OR [facility] FLR;  Service: Cardiovascular;  Laterality: N/A;    DRAINAGE OF PLEURAL EFFUSION  12/4/2023    Procedure: DRAINAGE, PLEURAL EFFUSION;  Surgeon: Yuri Washington MD;  [facility];  Service: Cardiovascular;;    INSERTION OF GRAFT TO PERICARDIUM  12/4/2023    Procedure: INSERTION, GRAFT, PERICARDIUM;  Surgeon: Yuir Washington MD;  Location: [facility] OR [facility] FLR;  Service: Cardiovascular;;    INSERTION OF INTRA-AORTIC BALLOON ASSIST DEVICE Right 11/21/2023    Procedure: INSERTION, INTRA-AORTIC BALLOON PUMP;  Surgeon: Finn Cohn MD;  Location: [facility] CATH LAB;  Service: Cardiology;  Laterality: Right;    LEFT VENTRICULAR ASSIST DEVICE Left 12/1/2023    Procedure: INSERTION-LEFT VENTRICULAR ASSIST DEVICE;  Surgeon: Yuri Washington MD;  [facility];  Service: Cardiovascular;  Laterality: Left;  REDO STERNOTOMY - REDO SAW NEEDED FOR CASE    LYSIS OF ADHESIONS  12/1/2023    Procedure: LYSIS, ADHESIONS;  Surgeon: Yuri Washington MD;  Location: [facility] OR [facility] FLR;  Service: Cardiovascular;;    PLACEMENT OF SWAN ROLANDO CATHETER WITH IMAGING GUIDANCE  11/20/2023    Procedure: INSERTION, CATHETER, SWAN-ROLANDO, WITH IMAGING GUIDANCE;  Surgeon: Gema Casas  Sajan KUNZ MD;  Location: [facility] CATH LAB;  Service: Cardiology;;    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 12/1/2023    Procedure: REPAIR, ANEURYSM, ARTERY, FEMORAL;  Surgeon: Yuri Washington MD;  Location: [facility] OR [address] [place] [address];  Service: Cardiovascular;  Laterality: Right;  Right Femoral Artery Repair    RIGHT HEART CATHETERIZATION Right 10/10/2023    Procedure: INSERTION, CATHETER, RIGHT HEART;  Surgeon: Bin Gandhi MD;  Location: [facility] CATH LAB;  Service: Cardiology;  Laterality: Right;    RIGHT HEART CATHETERIZATION Right 10/13/2023    Procedure: INSERTION, CATHETER, RIGHT HEART;  Surgeon: Walter Mcintyre MD;  Location: [facility] CATH LAB;  Service: Cardiology;  Laterality: Right;    RIGHT HEART CATHETERIZATION  11/13/2023    RIGHT HEART CATHETERIZATION Right 11/13/2023    Procedure: INSERTION, CATHETER, RIGHT HEART;  Surgeon: Juventino Bermudez Jr., MD;  Location: [facility] CATH LAB;  Service: Cardiology;  Laterality: Right;    RIGHT HEART CATHETERIZATION Right 11/20/2023    Procedure: INSERTION, CATHETER, RIGHT HEART;  Surgeon: Sajan Hurley MD;  Location: [facility] CATH LAB;  Service: Cardiology;  Laterality: Right;    RIGHT HEART CATHETERIZATION Right 1/22/2024    Procedure: INSERTION, CATHETER, RIGHT HEART;  Surgeon: Brayan Ocampo MD;  Location: [facility] CATH LAB;  Service: Cardiology;  Laterality: Right;    STERNAL WOUND CLOSURE N/A 12/4/2023    Procedure: CLOSURE, WOUND, STERNUM;  Surgeon: Yuri Washington MD;  [facility];  Service: Cardiovascular;  Laterality: N/A;    STERNOTOMY N/A 12/1/2023    Procedure: STERNOTOMY, REDO;  Surgeon: Yuri Washington MD;  [facility];  Service: Cardiovascular;  Laterality: N/A;    VALVULOPLASTY, MITRAL VALVE N/A 12/1/2023    Procedure: VALVULOPLASTY, MITRAL VALVE;  Surgeon: Yuri Washington MD;  [facility];  Service: Cardiovascular;  Laterality: N/A;         Family History   No family history on file.        Social History   .  Social  History     Socioeconomic History    Marital status:    Tobacco Use    Smoking status: Every Day     Current packs/day: 0.50     Types: Cigarettes   Substance and Sexual Activity    Alcohol use: Yes     Comment: rarely    Drug use: No     Social Determinants of Health     Financial Resource Strain: Low Risk  (10/27/2023)    Overall Financial Resource Strain (CARDIA)     Difficulty of Paying Living Expenses: Not hard at all   Food Insecurity: No Food Insecurity (10/27/2023)    Hunger Vital Sign     Worried About Running Out of Food in the Last Year: Never true     Ran Out of Food in the Last Year: Never true   Transportation Needs: No Transportation Needs (10/27/2023)    PRAPARE - Transportation     Lack of Transportation (Medical): No     Lack of Transportation (Non-Medical): No   Physical Activity: Inactive (10/27/2023)    Exercise Vital Sign     Days of Exercise per Week: 0 days     Minutes of Exercise per Session: 0 min   Stress: No Stress Concern Present (10/27/2023)    [place] [employer] [place] [employer] - Occupational Stress Questionnaire     Feeling of Stress : Not at all   Social Connections: Moderately Integrated (10/27/2023)    Social Connection and Isolation Panel [NHANES]     Frequency of Communication with Friends and Family: More than three times a week     Frequency of Social Gatherings with Friends and Family: More than three times a week     Attends Yazdanism Services: More than 4 times per year     Active Member of Clubs or Organizations: No     Attends Club or Organization Meetings: Never     Marital Status:    Housing Stability: Low Risk  (10/27/2023)    Housing Stability Vital Sign     Unable to Pay for Housing in the Last Year: No     Number of Places Lived in the Last Year: 1     Unstable Housing in the Last Year: No         Allergies   Review of patient's allergies indicates:  No Known Allergies        Physical Exam     Vitals:    02/29/24 1051   BP: 106/81   Pulse: 88          There is no height or weight on file to calculate BMI.      General: AOx3, NAD   Respiratory:  Symmetric chest rise, normal effort  Neck: No scars.  No cervical lymphadenopathy, thyromegaly or thyroid nodules.  Normal range of motion.  Roughly 2.5 cm firm, mobile right tail of parotid mass.  Face: House Brackmann I bilaterally.     CT neck reviewed.    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  Given its stability, I do not feel that he necessarily needs a biopsy.  Also, he is wary of proceed with biopsy as he is getting over his recent hospital admission.  I think that close observation with serial imaging is a reasonable choice given the totality of circumstances.  Will set up ultrasound of the neck in July of this year.         Relevant Orders    US Soft Tissue Head Neck

## 2024-02-29 NOTE — PROGRESS NOTES
HPI  This 63 y.o. y/o male with PMH of ischemic cardiomyopathy with most recent TTE showing EF 10 -15% and G3DD s/p LVAD, CAD s/p x3 vessel CABG back in 2009, type 2 diabetes mellitus (most recent hemoglobin A1c 7.6%), hypertension, HLD, history of ventricular fibrillation for which he is on amiodarone came in for IVÁN on CKD s/p SLED -> iHD, TDC removed on 3/1/2024.    Renal history  Creatinine   Date Value Ref Range Status   02/29/2024 3.5 (H) 0.5 - 1.4 mg/dL Final   02/26/2024 3.9 (H) 0.5 - 1.4 mg/dL Final   02/22/2024 3.8 (H) 0.5 - 1.4 mg/dL Final   02/22/2024 3.8 (H) 0.5 - 1.4 mg/dL Final   02/19/2024 4.1 (H) 0.5 - 1.4 mg/dL Final   02/15/2024 3.8 (H) 0.5 - 1.4 mg/dL Final   02/12/2024 3.8 (H) 0.5 - 1.4 mg/dL Final   02/08/2024 2.8 (H) 0.5 - 1.4 mg/dL Final   02/06/2024 3.1 (H) 0.5 - 1.4 mg/dL Final   02/01/2024 3.5 (H) 0.5 - 1.4 mg/dL Final   Taking torsemide 80 mg BID  S/p SLED-> iHD due to IVÁN 2/2 cardiogenic shock, now off  Cr around 2.2 in 2023 prior to the admission  Prot/Creat Ratio, Urine   Date Value Ref Range Status   02/22/2024 0.61 (H) 0.00 - 0.20 Final   02/15/2024 0.59 (H) 0.00 - 0.20 Final     Has been drinking 40 oz of fluid a day  Not taking NSAIDs    HTN  On LVAD  Current medication: hydralazine 50 mg Q8H, torsemide 80 mg BID, on amiodarone 400 mg daily  Has been compliant with low salt diet  BW increased from 165 Ibs to 171 Ibs likely 2/2 increasing appetite    Hx of DM  Now diet control  Lab Results   Component Value Date    HGBA1C 5.1 01/13/2024       Past Medical History:   Diagnosis Date    CAD (coronary artery disease)     CHF (congestive heart failure)     Diabetes mellitus     HFrEF (heart failure with reduced ejection fraction)     ICD (implantable cardioverter-defibrillator) in place     MI, old        Past Surgical History:   Procedure Laterality Date    ANGIOPLASTY-VENOUS ARTERY Right 12/1/2023    Procedure: ANGIOPLASTY-VENOUS ARTERY, RIGHT FEMORAL;  Surgeon: Yuri Washington MD;   Location: [facility] OR 2ND FLR;  Service: Cardiovascular;  Laterality: Right;    AORTIC VALVULOPLASTY N/A 12/1/2023    Procedure: REPAIR, AORTIC VALVE;  Surgeon: Yuri Washington MD;  Location: [facility] OR 2ND FLR;  Service: Cardiovascular;  Laterality: N/A;    CARDIAC SURGERY      CLOSURE N/A 12/1/2023    Procedure: CLOSURE, TEMPORARY;  Surgeon: Yuri Washington MD;  Location: [facility] OR 2ND FLR;  Service: Cardiovascular;  Laterality: N/A;    DRAINAGE OF PLEURAL EFFUSION  12/4/2023    Procedure: DRAINAGE, PLEURAL EFFUSION;  Surgeon: Yuri Washington MD;  Location: [facility] OR 2ND FLR;  Service: Cardiovascular;;    INSERTION OF GRAFT TO PERICARDIUM  12/4/2023    Procedure: INSERTION, GRAFT, PERICARDIUM;  Surgeon: Yuri Washington MD;  Location: [facility] OR 2ND FLR;  Service: Cardiovascular;;    INSERTION OF INTRA-AORTIC BALLOON ASSIST DEVICE Right 11/21/2023    Procedure: INSERTION, INTRA-AORTIC BALLOON PUMP;  Surgeon: Finn Cohn MD;  Location: [facility] CATH LAB;  Service: Cardiology;  Laterality: Right;    LEFT VENTRICULAR ASSIST DEVICE Left 12/1/2023    Procedure: INSERTION-LEFT VENTRICULAR ASSIST DEVICE;  Surgeon: Yuri Washington MD;  Location: [facility] OR 2ND FLR;  Service: Cardiovascular;  Laterality: Left;  REDO STERNOTOMY - REDO SAW NEEDED FOR CASE    LYSIS OF ADHESIONS  12/1/2023    Procedure: LYSIS, ADHESIONS;  Surgeon: Yuri Washington MD;  [facility];  Service: Cardiovascular;;    PLACEMENT OF SWAN ROLANDO CATHETER WITH IMAGING GUIDANCE  11/20/2023    Procedure: INSERTION, CATHETER, SWAN-ROLANDO, WITH IMAGING GUIDANCE;  Surgeon: Sajan Hurley MD;  Location: [facility] CATH LAB;  Service: Cardiology;;    REMOVAL OF TUNNELED CENTRAL VENOUS CATHETER (CVC) N/A 3/1/2024    Procedure: REMOVAL, CATHETER, CENTRAL VENOUS, TUNNELED;  Surgeon: Seble Aguilar MD;  Location: [facility] CATH LAB;  Service: Interventional Nephrology;  Laterality: N/A;    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 12/1/2023    Procedure: REPAIR,  ANEURYSM, ARTERY, FEMORAL;  Surgeon: Yuri Washington MD;  [facility];  Service: Cardiovascular;  Laterality: Right;  Right Femoral Artery Repair    RIGHT HEART CATHETERIZATION Right 10/10/2023    Procedure: INSERTION, CATHETER, RIGHT HEART;  Surgeon: Bin Gandhi MD;  Location: [facility] CATH LAB;  Service: Cardiology;  Laterality: Right;    RIGHT HEART CATHETERIZATION Right 10/13/2023    Procedure: INSERTION, CATHETER, RIGHT HEART;  Surgeon: Walter Mcintyre MD;  Location: [facility] CATH LAB;  Service: Cardiology;  Laterality: Right;    RIGHT HEART CATHETERIZATION  11/13/2023    RIGHT HEART CATHETERIZATION Right 11/13/2023    Procedure: INSERTION, CATHETER, RIGHT HEART;  Surgeon: Juventino Bermudez Jr., MD;  Location: [facility] CATH LAB;  Service: Cardiology;  Laterality: Right;    RIGHT HEART CATHETERIZATION Right 11/20/2023    Procedure: INSERTION, CATHETER, RIGHT HEART;  Surgeon: Sajan Hurley MD;  Location: [facility] CATH LAB;  Service: Cardiology;  Laterality: Right;    RIGHT HEART CATHETERIZATION Right 1/22/2024    Procedure: INSERTION, CATHETER, RIGHT HEART;  Surgeon: Brayan Ocampo MD;  Location: [facility] CATH LAB;  Service: Cardiology;  Laterality: Right;    STERNAL WOUND CLOSURE N/A 12/4/2023    Procedure: CLOSURE, WOUND, STERNUM;  Surgeon: Yuri Washington MD;  [address]  Service: Cardiovascular;  Laterality: N/A;    STERNOTOMY N/A 12/1/2023    Procedure: STERNOTOMY, REDO;  Surgeon: Yuri Washington MD;  [address]  Service: Cardiovascular;  Laterality: N/A;    VALVULOPLASTY, MITRAL VALVE N/A 12/1/2023    Procedure: VALVULOPLASTY, MITRAL VALVE;  Surgeon: Yuri Washington MD;  [facility];  Service: Cardiovascular;  Laterality: N/A;       Review of patient's allergies indicates:  No Known Allergies      Social History     Socioeconomic History    Marital status:    Tobacco Use    Smoking status: Former     Current packs/day: 0.50     Types:  Cigarettes   Substance and Sexual Activity    Alcohol use: Yes     Comment: rarely    Drug use: No    Sexual activity: Not Currently     Partners: Female     Social Determinants of Health     Financial Resource Strain: High Risk (2/29/2024)    Overall Financial Resource Strain (CARDIA)     Difficulty of Paying Living Expenses: Hard   Food Insecurity: Food Insecurity Present (2/29/2024)    Hunger Vital Sign     Worried About Running Out of Food in the Last Year: Sometimes true     Ran Out of Food in the Last Year: Often true   Transportation Needs: No Transportation Needs (2/29/2024)    PRAPARE - Transportation     Lack of Transportation (Medical): No     Lack of Transportation (Non-Medical): No   Physical Activity: Inactive (2/29/2024)    Exercise Vital Sign     Days of Exercise per Week: 2 days     Minutes of Exercise per Session: 0 min   Stress: Stress Concern Present (2/29/2024)    [employer] [place] [employer] - Occupational Stress Questionnaire     Feeling of Stress : To some extent   Social Connections: Moderately Integrated (2/29/2024)    Social Connection and Isolation Panel [NHANES]     Frequency of Communication with Friends and Family: More than three times a week     Frequency of Social Gatherings with Friends and Family: Once a week     Attends Sikh Services: More than 4 times per year     Active Member of Clubs or Organizations: No     Attends Club or Organization Meetings: Never     Marital Status:    Housing Stability: High Risk (2/29/2024)    Housing Stability Vital Sign     Unable to Pay for Housing in the Last Year: Yes     Number of Places Lived in the Last Year: 1     Unstable Housing in the Last Year: No       No family history on file.    ROS negative except listed above    PHYSICAL EXAMINATION:  Blood pressure 110/82, pulse 92, weight 78 kg (171 lb 15.3 oz).  Constitutional - No acute distress  HEENT - Grossly normal  Neck - supple.   Cardiovascular - JVP mild distended  8 cm likely chronic  Respiratory - Mild basal rales  Musculoskeletal - Peripheral edema no  Dermatologic/Skin - Skin warm and dry.  No rashes.    Neurologic - No acute neurological deficit  Psychiatric - AAOx3  LVAD+    Assessment and Plan  CKD Stage 4 prior to hospitalization: 2/2 HTN, HFrEF on LVAD, prior IVÁN on RRT, now off/TDC removed on 3/1/2024    Urine Protein Creatinine Ratios:    Prot/Creat Ratio, Urine   Date Value Ref Range Status   02/22/2024 0.61 (H) 0.00 - 0.20 Final   02/15/2024 0.59 (H) 0.00 - 0.20 Final         Acid-Base:   Lab Results   Component Value Date     02/29/2024    K 4.9 02/29/2024    CO2 27 02/29/2024     -Counseled to avoid NSAIDs  -Counseled to drink 40 oz a day  -Will refer for CKD education class and renal txp evaluation (currently under heart txp evaluation)    2. HTN: BP goal 130/80 mmHg  -Counseled for low salt diet  -Continue hydralazine 50 mg Q8H, torsemide 40 mg BID, on amiodarone 400 mg daily    3. Secondary hyperparathyroidism  Lab Results   Component Value Date    .4 (H) 02/22/2024    CALCIUM 9.3 02/29/2024    CALCIUM 8.7 02/26/2024    CAION 1.16 01/23/2024    CAION 1.20 01/21/2024    PHOS 3.7 02/29/2024    PHOS 4.4 02/22/2024     Vit D, 25-Hydroxy   Date Value Ref Range Status   12/29/2023 23 (L) 30 - 96 ng/mL Final     Comment:     Vitamin D deficiency.........<10 ng/mL                              Vitamin D insufficiency......10-29 ng/mL       Vitamin D sufficiency........> or equal to 30 ng/mL  Vitamin D toxicity............>100 ng/mL        Will continue to monitor    4. Anemia:   Lab Results   Component Value Date    HGB 8.8 (L) 02/29/2024    FERRITIN 4,749 (H) 01/05/2024    IRON 25 (L) 01/05/2024    TRANSFERRIN 184 (L) 01/05/2024    TIBC 272 01/05/2024    FESATURATED 9 (L) 01/05/2024   High ferritin, contraindicated for iron therapy  Will arrange Aranesp 50 mcg every 2 weeks at [place]; target blood count 10-11.    5. Hx of DM:  Last HbA1C   Lab  Results   Component Value Date    HGBA1C 5.1 01/13/2024   Diet control  Counseled the patient regarding the importance of sugar control to slow CKD progression       6. HFrEF & HFpEF  Counseled the patient to watch and keep the weight stable as unstable fluid status can affect the kidney function.      ESRD planing when eGFR < 15   Refer to kidney txp (ongoing cardiac txp evaluation)  Refer for CKD education class     Follow up in 2 months

## 2024-03-01 ENCOUNTER — TELEPHONE (OUTPATIENT)
Dept: NEPHROLOGY | Facility: CLINIC | Age: 64
End: 2024-03-01

## 2024-03-01 ENCOUNTER — HOSPITAL ENCOUNTER (OUTPATIENT)
Facility: HOSPITAL | Age: 64
Discharge: HOME | End: 2024-03-01
Attending: INTERNAL MEDICINE | Admitting: INTERNAL MEDICINE
Payer: MEDICAID

## 2024-03-01 VITALS
DIASTOLIC BLOOD PRESSURE: 85 MMHG | WEIGHT: 174 LBS | SYSTOLIC BLOOD PRESSURE: 100 MMHG | HEART RATE: 87 BPM | OXYGEN SATURATION: 100 % | RESPIRATION RATE: 16 BRPM | TEMPERATURE: 98 F | BODY MASS INDEX: 23.57 KG/M2 | HEIGHT: 72 IN

## 2024-03-01 DIAGNOSIS — N17.9 AKI (ACUTE KIDNEY INJURY): ICD-10-CM

## 2024-03-01 LAB
POCT GLUCOSE: 155 MG/DL (ref 70–110)
POCT GLUCOSE: 161 MG/DL (ref 70–110)

## 2024-03-01 PROCEDURE — 36589 REMOVAL TUNNELED CV CATH: CPT | Mod: RT,,, | Performed by: INTERNAL MEDICINE

## 2024-03-01 PROCEDURE — 94618 PULMONARY STRESS TESTING: CPT | Mod: 26,S$PBB,, | Performed by: INTERNAL MEDICINE

## 2024-03-01 PROCEDURE — 36589 REMOVAL TUNNELED CV CATH: CPT | Mod: RT | Performed by: INTERNAL MEDICINE

## 2024-03-01 PROCEDURE — 82962 GLUCOSE BLOOD TEST: CPT | Performed by: INTERNAL MEDICINE

## 2024-03-01 PROCEDURE — 25000003 PHARM REV CODE 250: Performed by: INTERNAL MEDICINE

## 2024-03-01 RX ORDER — LIDOCAINE HYDROCHLORIDE AND EPINEPHRINE 20; 10 MG/ML; UG/ML
INJECTION, SOLUTION INFILTRATION; PERINEURAL
Status: DISCONTINUED | OUTPATIENT
Start: 2024-03-01 | End: 2024-03-01 | Stop reason: HOSPADM

## 2024-03-01 RX ORDER — WARFARIN 2.5 MG/1
TABLET ORAL
Qty: 45 TABLET | Refills: 5 | Status: ON HOLD | OUTPATIENT
Start: 2024-03-01 | End: 2024-03-27

## 2024-03-01 RX ORDER — SODIUM CHLORIDE 9 MG/ML
INJECTION, SOLUTION INTRAVENOUS CONTINUOUS
Status: ACTIVE | OUTPATIENT
Start: 2024-03-01 | End: 2024-03-01

## 2024-03-01 NOTE — DISCHARGE SUMMARY
CC: IVÁN with LVAD with stable renal function. For removal of Rt. IJ cuffed tunneled HD cath.     HPI:  Mr. Abbott is here today for removal of a Rt. IJ cuffed tunneled HD cath. He is a known case with CAD, CHF, A fib with LVAD. He had IVÁN. He was on hemodialysis his renal function improved and he do not need HD. The procedure was explained including bleeding and pain. and the consent was signed. Because of the LVAD the Warfarin was held fro only one dose to avoid increasing the possibility of blood clotting with the LVAD.              Past Medical History:   Diagnosis Date    CAD (coronary artery disease)      CHF (congestive heart failure)      Diabetes mellitus      HFrEF (heart failure with reduced ejection fraction)      ICD (implantable cardioverter-defibrillator) in place      MI, old              History reviewed. No pertinent family history.     Review of patient's allergies indicates:  No Known Allergies      Review of Systems   Constitutional: Negative.   Respiratory: Negative.   Cardiovascular:   LVAD device   Gastrointestinal: Negative.   Genitourinary: Negative.   Skin: Negative.   Vitals         Vitals:     03/01/24 0555 03/01/24 0557 03/01/24 0610   BP: (!) 96/0 (!) 92/0 (!) 96/0   BP Location: Right arm Left arm     Patient Position: Lying Lying     Pulse: 85       Resp: 19       Temp: 97.7 °F (36.5 °C)       TempSrc: Temporal       Weight: 78.9 kg (174 lb)       Height: 6' (1.829 m)             Physical Exam  Constitutional:   Appearance: Normal appearance.   HENT:   Head: Normocephalic.   Nose: Nose normal.   Cardiovascular:   Comments: LVAD device.   Pulmonary:   Effort: Pulmonary effort is normal.   Musculoskeletal:   Cervical back: Normal range of motion.   Neurological:   Mental Status: He is alert.        Removal of Rt. IJ cuffed tunneled cath.      The Consent was obtained after explaining the procedure.   The skin was sterilized with Chlorhexidine.   1% Xylocaine with Epineph. was used to  anesthetize the area.   The catheter tunnel was dissected to release all the fibrous tissues.   The catheter was pulled out.   Pressure was held for 5 min. on the area.   The procedure was smooth with no complications.   The patient will remain for couple of hours monitoring.      Care at home:  Keep resting for 24 hours.   Head elevation at 45 degrees over the coming 8 hours.   Avoid any strenuous activities.   Keep the pressure dressing for 2 days.  Avoid getting water over the site for 2 days.   If there was any pain, Tylenol tab. 325mg could be used PRN.         Impression and Plan:    IVÁN was on HD. Improved renal function. The Rt. IJ cuffed tunneled HD catheter was removed.   Lt Vent Assisted Device. He can resume Warfarin this evening.   V tach  CAD  HFrEF  A Fib  DM II  Depressed          TRAVIS VILLA.AKASH. MD. JOSSE. FACP.  Nephrology Consultant.   Department of Nephrology.  [facility].   [address] [place] [address]. 5th [place] [address].     email: [email or web address]  Tel: Office: [phone]

## 2024-03-01 NOTE — PLAN OF CARE
Patient arrived to room. PIV placed. Admit assessment completed. Plan of care discussed with patient. Will monitor. Call light within reach, instructed in use.  POC   LVAD info entered, site CDI  Right subclavian dialysis catheter with dsg CDI

## 2024-03-01 NOTE — PATIENT CARE CONFERENCE
Care post HD cuffed tunneled catheter removal:      Bed rest for  2 hours.  With head elevation 45 degree.   When at home stay in bed or resting on a chair or couch for over night.   Avoid water over the site for  48 hours.   Keep the pressure dressing for 24 hours.   The whole dressing can be removed after taking a bath in  48 hours.    A small band aid could be applied as needed.   If there is any bleeding noticed after you leave the hospital, please come to ER immediatly.    If you have any question, please do not hesitate to call.       TRAVIS VILLA.Adelia. MD. JOSSE. FACP.  , [employer] [facility] [employer]  Nephrology Consultant. [facility].   [facility],  [facility]. 5th [place] [address].    email: [email or web address]  Tel: Office: [phone]

## 2024-03-01 NOTE — PROCEDURES
Radha Abbott is a 63 y.o.  male patient, who presents for a 6 minute walk test ordered by MD Padmini.  The diagnosis is Left Ventricular Assist Device; Congestive Heart Failure.  The patient's BMI is 22.8 kg/m2.  Predicted distance (lower limit of normal) is 435.75 meters.      Test Results:    The test was completed with stops. The patient stopped 1 time for a total of 90 seconds. The total time walked was 270 seconds. During walking, the patient reported:  Other (Comment) (leg weakness). The patient used a walker for assistance during testing.     02/29/2024---------Distance: 182.88 meters (600 feet)     O2 Sat % Supplemental Oxygen Heart Rate Blood Pressure Pavithra Scale   Pre-exercise  (Resting) Unable to obtain Room Air 82 bpm 103/78 mmHg 0   During Exercise Unable to obtain Room Air 91 bpm 116/74 mmHg 0   Post-exercise  (Recovery) Unable to obtain Room Air  91 bpm       Recovery Time: 62 seconds  The patient walked the first 15 feet in 6.20 seconds.    Performing nurse/tech: RENE North      PREVIOUS STUDY:   The patient has not had a previous study.      CLINICAL INTERPRETATION:  Six minute walk distance is 182.88 meters (600 feet) with no dyspnea.  Oxygen saturation could not be determined due to technical limitations of pulse oximetry.  Both blood pressure and heart rate remained stable with walking.  The patient reported non-pulmonary symptoms during exercise.  Significant exercise impairment is likely due to subjective symptoms.  The patient did complete the study, walking 270 seconds of the 360 second test.  No previous study performed.  Based upon age and body mass index, exercise capacity is less than predicted.

## 2024-03-01 NOTE — H&P
CC:  IVÁN with LVAD with stable renal function. For removal of Rt. IJ cuffed tunneled HD cath.    HPI:  Mr. Abbott is here today for removal of a Rt. IJ cuffed tunneled HD cath. He is a known case with CAD, CHF, A fib with LVAD. He had IVÁN. He was on hemodialysis his renal function improved and he do not need HD. The procedure was explained including bleeding and pain. and the consent was signed. Because of the LVAD the Warfarin was held fro only one dose to avoid increasing the possibility of blood clotting with the LVAD.       Past Medical History:   Diagnosis Date    CAD (coronary artery disease)     CHF (congestive heart failure)     Diabetes mellitus     HFrEF (heart failure with reduced ejection fraction)     ICD (implantable cardioverter-defibrillator) in place     MI, old        Past Surgical History:   Procedure Laterality Date    ANGIOPLASTY-VENOUS ARTERY Right 12/1/2023    Procedure: ANGIOPLASTY-VENOUS ARTERY, RIGHT FEMORAL;  Surgeon: Yuri Washington MD;  Location: [facility] OR [address] [place] [address];  Service: Cardiovascular;  Laterality: Right;    AORTIC VALVULOPLASTY N/A 12/1/2023    Procedure: REPAIR, AORTIC VALVE;  Surgeon: Yuri Washington MD;  [facility];  Service: Cardiovascular;  Laterality: N/A;    CARDIAC SURGERY      CLOSURE N/A 12/1/2023    Procedure: CLOSURE, TEMPORARY;  Surgeon: Yuri Washington MD;  [facility];  Service: Cardiovascular;  Laterality: N/A;    DRAINAGE OF PLEURAL EFFUSION  12/4/2023    Procedure: DRAINAGE, PLEURAL EFFUSION;  Surgeon: Yuri Washington MD;  [facility];  Service: Cardiovascular;;    INSERTION OF GRAFT TO PERICARDIUM  12/4/2023    Procedure: INSERTION, GRAFT, PERICARDIUM;  Surgeon: Yuri Washington MD;  [facility];  Service: Cardiovascular;;    INSERTION OF INTRA-AORTIC BALLOON ASSIST DEVICE Right 11/21/2023    Procedure: INSERTION, INTRA-AORTIC BALLOON PUMP;  Surgeon: Finn Cohn MD;  Location: [facility] CATH LAB;  Service:  Cardiology;  Laterality: Right;    LEFT VENTRICULAR ASSIST DEVICE Left 12/1/2023    Procedure: INSERTION-LEFT VENTRICULAR ASSIST DEVICE;  Surgeon: Yuri Washington MD;  Location: [facility] OR [facility] FLR;  Service: Cardiovascular;  Laterality: Left;  REDO STERNOTOMY - REDO SAW NEEDED FOR CASE    LYSIS OF ADHESIONS  12/1/2023    Procedure: LYSIS, ADHESIONS;  Surgeon: Yuri Washington MD;  Location: [facility] OR [facility] FLR;  Service: Cardiovascular;;    PLACEMENT OF SWAN ROLANDO CATHETER WITH IMAGING GUIDANCE  11/20/2023    Procedure: INSERTION, CATHETER, SWAN-ROLANDO, WITH IMAGING GUIDANCE;  Surgeon: Sajan Hurley MD;  Location: [facility] CATH LAB;  Service: Cardiology;;    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 12/1/2023    Procedure: REPAIR, ANEURYSM, ARTERY, FEMORAL;  Surgeon: Yuri Washington MD;  [facility];  Service: Cardiovascular;  Laterality: Right;  Right Femoral Artery Repair    RIGHT HEART CATHETERIZATION Right 10/10/2023    Procedure: INSERTION, CATHETER, RIGHT HEART;  Surgeon: Bin Gandhi MD;  Location: [facility] CATH LAB;  Service: Cardiology;  Laterality: Right;    RIGHT HEART CATHETERIZATION Right 10/13/2023    Procedure: INSERTION, CATHETER, RIGHT HEART;  Surgeon: Walter Mcintyre MD;  Location: [facility] CATH LAB;  Service: Cardiology;  Laterality: Right;    RIGHT HEART CATHETERIZATION  11/13/2023    RIGHT HEART CATHETERIZATION Right 11/13/2023    Procedure: INSERTION, CATHETER, RIGHT HEART;  Surgeon: Juventino Bermudez Jr., MD;  Location: [facility] CATH LAB;  Service: Cardiology;  Laterality: Right;    RIGHT HEART CATHETERIZATION Right 11/20/2023    Procedure: INSERTION, CATHETER, RIGHT HEART;  Surgeon: Sajan Hurley MD;  Location: [facility] CATH LAB;  Service: Cardiology;  Laterality: Right;    RIGHT HEART CATHETERIZATION Right 1/22/2024    Procedure: INSERTION, CATHETER, RIGHT HEART;  Surgeon: Brayan Ocampo MD;  Location: [facility] CATH LAB;  Service: Cardiology;  Laterality: Right;    STERNAL WOUND  CLOSURE N/A 12/4/2023    Procedure: CLOSURE, WOUND, STERNUM;  Surgeon: Yuri Washington MD;  Location: [facility] OR [facility] FLR;  Service: Cardiovascular;  Laterality: N/A;    STERNOTOMY N/A 12/1/2023    Procedure: STERNOTOMY, REDO;  Surgeon: Yuri Washington MD;  Location: [facility] OR 2ND FLR;  Service: Cardiovascular;  Laterality: N/A;    VALVULOPLASTY, MITRAL VALVE N/A 12/1/2023    Procedure: VALVULOPLASTY, MITRAL VALVE;  Surgeon: Yuri Washington MD;  Location: [facility] OR 2ND FLR;  Service: Cardiovascular;  Laterality: N/A;       History reviewed. No pertinent family history.    Social History     Socioeconomic History    Marital status:    Tobacco Use    Smoking status: Former     Current packs/day: 0.50     Types: Cigarettes   Substance and Sexual Activity    Alcohol use: Yes     Comment: rarely    Drug use: No    Sexual activity: Not Currently     Partners: Female     Social Determinants of Health     Financial Resource Strain: High Risk (2/29/2024)    Overall Financial Resource Strain (CARDIA)     Difficulty of Paying Living Expenses: Hard   Food Insecurity: Food Insecurity Present (2/29/2024)    Hunger Vital Sign     Worried About Running Out of Food in the Last Year: Sometimes true     Ran Out of Food in the Last Year: Often true   Transportation Needs: No Transportation Needs (2/29/2024)    PRAPARE - Transportation     Lack of Transportation (Medical): No     Lack of Transportation (Non-Medical): No   Physical Activity: Inactive (2/29/2024)    Exercise Vital Sign     Days of Exercise per Week: 2 days     Minutes of Exercise per Session: 0 min   Stress: Stress Concern Present (2/29/2024)    [employer] - Occupational Stress Questionnaire     Feeling of Stress : To some extent   Social Connections: Moderately Integrated (2/29/2024)    Social Connection and Isolation Panel [NHANES]     Frequency of Communication with Friends and Family: More than three times a week     Frequency of Social  Gatherings with Friends and Family: Once a week     Attends Yazdanism Services: More than 4 times per year     Active Member of Clubs or Organizations: No     Attends Club or Organization Meetings: Never     Marital Status:    Housing Stability: High Risk (2/29/2024)    Housing Stability Vital Sign     Unable to Pay for Housing in the Last Year: Yes     Number of Places Lived in the Last Year: 1     Unstable Housing in the Last Year: No       Current Facility-Administered Medications   Medication Dose Route Frequency Provider Last Rate Last Admin    LIDOcaine 2%/EPINEPHrine 1:100,000 injection    PRN Seble Aguilar MD   20 mL at 03/01/24 0748       [unfilled]    Review of patient's allergies indicates:  No Known Allergies     Review of Systems   Constitutional: Negative.    Respiratory: Negative.     Cardiovascular:         LVAD device   Gastrointestinal: Negative.    Genitourinary: Negative.    Skin: Negative.       Vitals:    03/01/24 0555 03/01/24 0557 03/01/24 0610   BP: (!) 96/0 (!) 92/0 (!) 96/0   BP Location: Right arm Left arm    Patient Position: Lying Lying    Pulse: 85     Resp: 19     Temp: 97.7 °F (36.5 °C)     TempSrc: Temporal     Weight: 78.9 kg (174 lb)     Height: 6' (1.829 m)          Physical Exam  Constitutional:       Appearance: Normal appearance.   HENT:      Head: Normocephalic.      Nose: Nose normal.   Cardiovascular:      Comments: LVAD device.   Pulmonary:      Effort: Pulmonary effort is normal.   Musculoskeletal:      Cervical back: Normal range of motion.   Neurological:      Mental Status: He is alert.          Problem List Items Addressed This Visit    None  Visit Diagnoses       IVÁN (acute kidney injury)             Impression and Plan:    IVÁN was on HD. Improved renal function. Will remove the Rt. IJ cuffed tunneled HD catheter   Lt Vent Assisted Device  V tach  CAD  HFrEF  A Fib  DM II  Depressed      TRAVIS VILLA.Adelia. MD. FASN. FACP.  , [facility]  [employer] [facility] [employer]  Nephrology Consultant. [facility].   [address],  [facility]. 5th [place] [address].    email: [email or web address]  Tel: Office: [phone]

## 2024-03-01 NOTE — OP NOTE
Removal of RT.  IJ cuffed tunneled cath.     The Consent was obtained after explaining the procedure.   The skin was sterilized with Chlorhexidine.   1% Xylocaine with Epineph. was used to anesthetize the area.   The catheter tunnel was dissected to release all the fibrous tissues.   The catheter was pulled out.   Pressure was held for 5 min. on the area.   The procedure was smooth with no complications.   The patient will remain for couple of hours monitoring.     Care at home:  Keep resting for 24 hours.   Head elevation at 45 degrees over the coming 8 hours.   Avoid any strenuous activities.   Keep the pressure dressing for 2 days.  Avoid getting water over the site for 2 days.   If there was any pain, Tylenol tab. 325mg could be used PRN.       LEANA SCHMITT MD. FASN. FACP.  Nephrology Consultant.    Department of Nephrology.     [facility].   [address] [place] [address] [place]. 5th [place] [address].    email: [email or web address]  Tel: Office: [phone]           Rt.

## 2024-03-01 NOTE — PROCEDURES
Removal of Rt. IJ cuffed tunneled cath.     The Consent was obtained after explaining the procedure.   The skin was sterilized with Chlorhexidine.   1% Xylocaine with Epineph. was used to anesthetize the area.   The catheter tunnel was dissected to release all the fibrous tissues.   The catheter was pulled out.   Pressure was held for 5 min. on the area.   The procedure was smooth with no complications.   The patient will remain for couple of hours monitoring.     Care at home:  Keep resting for 24 hours.   Head elevation at 45 degrees over the coming 8 hours.   Avoid any strenuous activities.   Keep the pressure dressing for 2 days.  Avoid getting water over the site for 2 days.   If there was any pain, Tylenol tab. 325mg could be used PRN.       LEANA SCHMITT MD. FASN. FACP.  Nephrology Consultant.    Department of Nephrology.     [facility].   [address] [place] [address],  [facility]. 5th [place] [address].    email: [email or web address].  Tel: Office: [phone]

## 2024-03-01 NOTE — DISCHARGE INSTRUCTIONS
Bed rest for  2 hours.  With head elevation 45 degree.   When at home stay in bed or resting on a chair or couch for over night.   Avoid water over the site for  48 hours.   Keep the pressure dressing for 24 hours.   The whole dressing can be removed after taking a bath in  48 hours.    A small band aid could be applied as needed.   If there is any bleeding noticed after you leave the hospital, please come to ER immediatly.     If you have any question, please do not hesitate to call.

## 2024-03-01 NOTE — NURSING
Patient discharged per MD orders. Instructions given on medications, wound care, activity, signs of infection, when to call MD, and follow up appointments. Pt verbalized understanding.  Patient AAOx3, VSS, no c/o pain or discomfort at this time. Telemetry and PIV removed. Patient left unit via wheelchair with family.   LVAD intact

## 2024-03-01 NOTE — OP NOTE
Removal of Rt.  IJ cuffed tunneled cath.     The Consent was obtained after explaining the procedure.   The skin was sterilized with Chlorhexidine.   1% Xylocaine with Epineph. was used to anesthetize the area.   The catheter tunnel was dissected to release all the fibrous tissues.   The catheter was pulled out.   Pressure was held for 5 min. on the area.   The procedure was smooth with no complications.   The patient will remain for couple of hours monitoring.     Care at home:  Keep resting for 24 hours.   Head elevation at 45 degrees over the coming 8 hours.   Avoid any strenuous activities.   Keep the pressure dressing for 2 days.  Avoid getting water over the site for 2 days.   If there was any pain, Tylenol tab. 325mg could be used PRN.       LEANA SCHMITT MD. FASN. FACP.  Nephrology Consultant.    Department of Nephrology.     [facility].   [address] [place] [address],  [facility]. 5th [place] [address].    email: [email or web address].  Tel: Office: [phone]

## 2024-03-01 NOTE — NURSING
Received report from CL. Patient s/p tunnel cath removal, AAOx3. VSS, no c/o pain or discomfort at this time, resp even and unlabored. Gauze/tegaderm dressing to right upper chest area is CDI. No active bleeding. No hematoma noted. Post procedure protocol reviewed with patient and patient's family. Understanding verbalized. Family members at bedside. Nurse call bell within reach.   LVAD in use and secure.

## 2024-03-04 ENCOUNTER — ANTI-COAG VISIT (OUTPATIENT)
Dept: CARDIOLOGY | Facility: CLINIC | Age: 64
End: 2024-03-04
Payer: MEDICAID

## 2024-03-04 ENCOUNTER — LAB VISIT (OUTPATIENT)
Dept: LAB | Facility: HOSPITAL | Age: 64
End: 2024-03-04
Attending: INTERNAL MEDICINE
Payer: MEDICAID

## 2024-03-04 DIAGNOSIS — Z95.811 PRESENCE OF VENTRICULAR ASSIST DEVICE: ICD-10-CM

## 2024-03-04 DIAGNOSIS — Z95.811 LVAD (LEFT VENTRICULAR ASSIST DEVICE) PRESENT: Primary | ICD-10-CM

## 2024-03-04 LAB
INR PPP: 2.1 (ref 0.8–1.2)
PROTHROMBIN TIME: 21.2 SEC (ref 9–12.5)

## 2024-03-04 PROCEDURE — 85610 PROTHROMBIN TIME: CPT | Performed by: INTERNAL MEDICINE

## 2024-03-04 PROCEDURE — 93793 ANTICOAG MGMT PT WARFARIN: CPT | Mod: ,,,

## 2024-03-04 PROCEDURE — 36415 COLL VENOUS BLD VENIPUNCTURE: CPT | Performed by: INTERNAL MEDICINE

## 2024-03-04 NOTE — PROGRESS NOTES
[facility]    2024 10:30 AM    Assessment/Plan:    Patient presents today with therapeutic INR.    Assessment of patient findings and chart review: no significant findings     Recommendation for patient's warfarin regimen: Continue current maintenance dose    Recommend repeat INR Thursday  _________________________________________________________________    Emmanuelleaimee Abbott (63 y.o.) is followed by the [facility].    Anticoagulation Summary  As of 3/4/2024      INR goal:  2.0-3.0   TTR:  22.5 % (1 mo)   INR used for dosin.1 (3/4/2024)   Warfarin maintenance plan:  2.5 mg (2.5 mg x 1) every Mon, Wed, Fri; 3.75 mg (2.5 mg x 1.5) all other days   Weekly warfarin total:  22.5 mg   Plan last modified:  Jesenia Jensen, PharmD (2/15/2024)   Next INR check:  3/7/2024   Target end date:      Indications    LVAD (left ventricular assist device) present [Z95.811]                 Anticoagulation Episode Summary       INR check location:      Preferred lab:      Send INR reminders to:  [facility] COUMADIN LVAD    Comments:  [place]          Anticoagulation Care Providers       Provider Role Specialty Phone number    Sajan Hurley MD [facility] Cardiology [phone]

## 2024-03-05 ENCOUNTER — DOCUMENT SCAN (OUTPATIENT)
Dept: HOME HEALTH SERVICES | Facility: HOSPITAL | Age: 64
End: 2024-03-05
Payer: MEDICAID

## 2024-03-07 ENCOUNTER — TELEPHONE (OUTPATIENT)
Dept: ELECTROPHYSIOLOGY | Facility: CLINIC | Age: 64
End: 2024-03-07
Payer: MEDICAID

## 2024-03-07 ENCOUNTER — ANTI-COAG VISIT (OUTPATIENT)
Dept: CARDIOLOGY | Facility: CLINIC | Age: 64
End: 2024-03-07
Payer: MEDICAID

## 2024-03-07 ENCOUNTER — DOCUMENT SCAN (OUTPATIENT)
Dept: HOME HEALTH SERVICES | Facility: HOSPITAL | Age: 64
End: 2024-03-07
Payer: MEDICAID

## 2024-03-07 ENCOUNTER — HOSPITAL ENCOUNTER (OUTPATIENT)
Dept: PULMONOLOGY | Facility: CLINIC | Age: 64
Discharge: HOME | End: 2024-03-07
Payer: MEDICAID

## 2024-03-07 ENCOUNTER — OFFICE VISIT (OUTPATIENT)
Dept: NEPHROLOGY | Facility: CLINIC | Age: 64
End: 2024-03-07
Payer: MEDICAID

## 2024-03-07 VITALS
WEIGHT: 171.94 LBS | BODY MASS INDEX: 23.32 KG/M2 | DIASTOLIC BLOOD PRESSURE: 82 MMHG | SYSTOLIC BLOOD PRESSURE: 110 MMHG | HEART RATE: 92 BPM

## 2024-03-07 DIAGNOSIS — Z79.899 AT RISK FOR AMIODARONE TOXICITY WITH LONG TERM USE: ICD-10-CM

## 2024-03-07 DIAGNOSIS — D63.1 ANEMIA IN STAGE 4 CHRONIC KIDNEY DISEASE: ICD-10-CM

## 2024-03-07 DIAGNOSIS — E11.22 TYPE 2 DIABETES MELLITUS WITH STAGE 4 CHRONIC KIDNEY DISEASE, UNSPECIFIED WHETHER LONG TERM INSULIN USE: ICD-10-CM

## 2024-03-07 DIAGNOSIS — I10 HYPERTENSION, UNSPECIFIED TYPE: ICD-10-CM

## 2024-03-07 DIAGNOSIS — Z91.89 AT RISK FOR AMIODARONE TOXICITY WITH LONG TERM USE: ICD-10-CM

## 2024-03-07 DIAGNOSIS — N18.4 ANEMIA IN STAGE 4 CHRONIC KIDNEY DISEASE: ICD-10-CM

## 2024-03-07 DIAGNOSIS — I50.9 CONGESTIVE HEART FAILURE, UNSPECIFIED HF CHRONICITY, UNSPECIFIED HEART FAILURE TYPE: ICD-10-CM

## 2024-03-07 DIAGNOSIS — N18.4 CHRONIC KIDNEY DISEASE, STAGE 4 (SEVERE): Primary | ICD-10-CM

## 2024-03-07 DIAGNOSIS — Z95.811 LVAD (LEFT VENTRICULAR ASSIST DEVICE) PRESENT: Primary | ICD-10-CM

## 2024-03-07 DIAGNOSIS — N25.81 SECONDARY HYPERPARATHYROIDISM: ICD-10-CM

## 2024-03-07 DIAGNOSIS — Z95.811 PRESENCE OF VENTRICULAR ASSIST DEVICE: ICD-10-CM

## 2024-03-07 DIAGNOSIS — N18.4 TYPE 2 DIABETES MELLITUS WITH STAGE 4 CHRONIC KIDNEY DISEASE, UNSPECIFIED WHETHER LONG TERM INSULIN USE: ICD-10-CM

## 2024-03-07 PROCEDURE — 94010 BREATHING CAPACITY TEST: CPT | Mod: 26,S$PBB,, | Performed by: INTERNAL MEDICINE

## 2024-03-07 PROCEDURE — 99214 OFFICE O/P EST MOD 30 MIN: CPT | Mod: PBBFAC,25 | Performed by: INTERNAL MEDICINE

## 2024-03-07 PROCEDURE — 94010 BREATHING CAPACITY TEST: CPT | Mod: PBBFAC | Performed by: INTERNAL MEDICINE

## 2024-03-07 PROCEDURE — 99214 OFFICE O/P EST MOD 30 MIN: CPT | Mod: S$PBB,,, | Performed by: INTERNAL MEDICINE

## 2024-03-07 PROCEDURE — 94729 DIFFUSING CAPACITY: CPT | Mod: PBBFAC | Performed by: INTERNAL MEDICINE

## 2024-03-07 PROCEDURE — 3079F DIAST BP 80-89 MM HG: CPT | Mod: CPTII,,, | Performed by: INTERNAL MEDICINE

## 2024-03-07 PROCEDURE — 3044F HG A1C LEVEL LT 7.0%: CPT | Mod: CPTII,,, | Performed by: INTERNAL MEDICINE

## 2024-03-07 PROCEDURE — 94727 GAS DIL/WSHOT DETER LNG VOL: CPT | Mod: 26,S$PBB,, | Performed by: INTERNAL MEDICINE

## 2024-03-07 PROCEDURE — 1160F RVW MEDS BY RX/DR IN RCRD: CPT | Mod: CPTII,,, | Performed by: INTERNAL MEDICINE

## 2024-03-07 PROCEDURE — 3074F SYST BP LT 130 MM HG: CPT | Mod: CPTII,,, | Performed by: INTERNAL MEDICINE

## 2024-03-07 PROCEDURE — 3066F NEPHROPATHY DOC TX: CPT | Mod: CPTII,,, | Performed by: INTERNAL MEDICINE

## 2024-03-07 PROCEDURE — 3008F BODY MASS INDEX DOCD: CPT | Mod: CPTII,,, | Performed by: INTERNAL MEDICINE

## 2024-03-07 PROCEDURE — 94729 DIFFUSING CAPACITY: CPT | Mod: 26,S$PBB,, | Performed by: INTERNAL MEDICINE

## 2024-03-07 PROCEDURE — 1159F MED LIST DOCD IN RCRD: CPT | Mod: CPTII,,, | Performed by: INTERNAL MEDICINE

## 2024-03-07 PROCEDURE — 99999 PR PBB SHADOW E&M-EST. PATIENT-LVL IV: CPT | Mod: PBBFAC,,, | Performed by: INTERNAL MEDICINE

## 2024-03-07 PROCEDURE — 94727 GAS DIL/WSHOT DETER LNG VOL: CPT | Mod: PBBFAC | Performed by: INTERNAL MEDICINE

## 2024-03-07 PROCEDURE — 93793 ANTICOAG MGMT PT WARFARIN: CPT | Mod: ,,,

## 2024-03-07 NOTE — PROGRESS NOTES
[facility]    2024 10:08 AM    Assessment/Plan:    Patient presents today with therapeutic INR.    Assessment of patient findings and chart review: no significant findings     Recommendation for patient's warfarin regimen: Continue current maintenance dose    Recommend repeat INR in 1 week  _________________________________________________________________    Radha Abbott (63 y.o.) is followed by the [facility].    Anticoagulation Summary  As of 3/7/2024      INR goal:  2.0-3.0   TTR:  29.7 % (1.1 mo)   INR used for dosin.4 (3/7/2024)   Warfarin maintenance plan:  2.5 mg (2.5 mg x 1) every Mon, Wed, Fri; 3.75 mg (2.5 mg x 1.5) all other days   Weekly warfarin total:  22.5 mg   Plan last modified:  Jesenia Jensen, PharmD (2/15/2024)   Next INR check:  3/14/2024   Target end date:      Indications    LVAD (left ventricular assist device) present [Z95.811]                 Anticoagulation Episode Summary       INR check location:      Preferred lab:      Send INR reminders to:  [facility] COUMADIN LVAD    Comments:  [place]          Anticoagulation Care Providers       Provider Role Specialty Phone number    Sajan Hurley MD [facility] Cardiology [phone]

## 2024-03-11 ENCOUNTER — TELEPHONE (OUTPATIENT)
Dept: TRANSPLANT | Facility: CLINIC | Age: 64
End: 2024-03-11
Payer: MEDICAID

## 2024-03-11 PROBLEM — Z01.818 PRE-TRANSPLANT EVALUATION FOR HEART TRANSPLANT: Status: RESOLVED | Noted: 2023-11-17 | Resolved: 2024-03-11

## 2024-03-11 PROBLEM — N18.4 CKD (CHRONIC KIDNEY DISEASE), STAGE IV: Status: ACTIVE | Noted: 2024-03-11

## 2024-03-11 PROBLEM — I50.84 END STAGE HEART FAILURE: Status: ACTIVE | Noted: 2024-03-11

## 2024-03-11 NOTE — TELEPHONE ENCOUNTER
Signed Home Health Orders has been faxed to [facility]   Date 3/11/24  Order Number(s): [id number]

## 2024-03-11 NOTE — PROGRESS NOTES
Subjective:   Patient ID:  Radha Abbott is a 63 y.o. male who presents for LVAD followup visit.    Implant Date:12/1/23  Initials:LxH     Heartmate 3 RPM 5000     INR goal: 2-3   Bridge with Heparin   Antiplatelets: None d/t low H/H on implant         3/1/2024     6:10 AM   TXP PINA INTERROGATIONS   Pulsatility No Pulse   Interrogation of Ventricular assist device was performed with physician analysis of device parameters and review of device function. I have personally reviewed the interrogation findings and agree with findings as stated.     HPI  64 yo BM with stage D congestive heart failure returns for follow-up visit.   He remains on torsemide 80 mg twice daily.     Patient is getting stronger and stronger, last visit was only walking from room to room, doing better today. Walking out of clinic on his own, feels good overall. Volume stable. Additionally patient has no bleeding, no bright red blood per rectum, melena, no GI symptoms at all.  +COELLO, but volume improving.     Review of Systems   Constitutional: Positive for malaise/fatigue.   HENT:  Positive for hoarse voice (this has improved).    Cardiovascular:  Positive for dyspnea on exertion, leg swelling and orthopnea (sleeping on 1-2 pillows). Negative for near-syncope, palpitations, paroxysmal nocturnal dyspnea and syncope.   Hematologic/Lymphatic: Does not bruise/bleed easily.   Neurological:  Positive for weakness. Negative for dizziness and light-headedness.       Objective:   Blood pressure (!) 90/0, pulse 85, temperature 97.8 °F (36.6 °C), temperature source Oral, height 6' (1.829 m), weight 78.9 kg (174 lb).body mass index is 23.6 kg/m².    Physical Exam  Constitutional:       General: He is not in acute distress.     Appearance: He is well-developed. He is not ill-appearing, toxic-appearing or diaphoretic.   HENT:      Head: Normocephalic and atraumatic.   Eyes:      General: No scleral icterus.        Right eye: No discharge.         Left eye: No  discharge.      Conjunctiva/sclera: Conjunctivae normal.   Neck:      Thyroid: No thyromegaly.      Vascular: No JVD.      Trachea: No tracheal deviation.   Cardiovascular:      Comments: Normal LVAD sounds; DL 1  Pulmonary:      Effort: Pulmonary effort is normal. No respiratory distress.      Breath sounds: Normal breath sounds. No wheezing or rales.   Abdominal:      General: Bowel sounds are normal. There is no distension.      Palpations: Abdomen is soft. There is no mass.      Tenderness: There is no abdominal tenderness. There is no guarding or rebound.   Musculoskeletal:         General: Swelling present. No tenderness.   Skin:     General: Skin is warm and dry.   Neurological:      General: No focal deficit present.      Mental Status: He is alert. Mental status is at baseline.   Psychiatric:         Mood and Affect: Mood normal.         Behavior: Behavior normal.         Thought Content: Thought content normal.         Judgment: Judgment normal.       Lab Results   Component Value Date     (H) 02/29/2024     (H) 02/26/2024     (H) 02/15/2024     Lab Results   Component Value Date     (H) 02/29/2024     02/29/2024    K 4.9 02/29/2024    MG 2.0 02/29/2024    CL 99 02/29/2024    CO2 27 02/29/2024    PHOS 3.7 02/29/2024    BUN 76 (H) 02/29/2024    CREATININE 3.5 (H) 02/29/2024     (H) 02/29/2024    HGBA1C 5.1 01/13/2024    AST 31 02/29/2024    ALT 51 (H) 02/29/2024    ALBUMIN 3.2 (L) 02/29/2024    PROT 8.0 02/29/2024    BILITOT 0.3 02/29/2024    WBC 7.00 02/29/2024    HGB 8.8 (L) 02/29/2024    HCT 28.5 (L) 02/29/2024    HCT 24 (L) 12/26/2023     02/29/2024    INR 2.4 (H) 03/07/2024     (H) 02/29/2024    TSH 1.381 11/15/2023    CHOL 107 (L) 11/15/2023    HDL 49 11/15/2023    LDLCALC 43.2 (L) 11/15/2023    TRIG 310 (H) 12/08/2023 1/5/2024 CT soft tissue neck  1. Mildly enlarged left supraclavicular lymph node, nonspecific.  Metastatic disease is not  excluded.  2. Right upper extremity PICC line malpositioned with tip directed superiorly within a small branch of the right internal jugular vein.  Recommend repositioning.  3. 2.2 cm right parotid mass.  This could represent benign or malignant primary or metastatic disease.  ENT follow-up recommended.  4. Enlarging left pleural effusion.  This report was flagged in Epic as abnormal.    Assessment:      1. HFrEF (heart failure with reduced ejection fraction)    2. LVAD (left ventricular assist device) present    3. Type 2 diabetes mellitus without complication, without long-term current use of insulin    4. At risk for amiodarone toxicity with long term use    5. End stage heart failure    6. CKD (chronic kidney disease), stage IV        Plan:   LFTs up last visit- at risk for amio toxicity, LFT improving though. EP visit pending.  Debility- improving, going to start cardiac rehab soon  CKD IV- getting HD catheter taken out, was put on hold but is going to proceed with removal since creatinine back down some  HTN- blood pressure stable      Supplies ordered are medically necessary for care of LVAD and DL    Post LVAD goals of care are to feel stronger and be more active.      Patient is now NYHA III  Recommend 2 gram sodium restriction and 1500cc fluid restriction.  Encourage physical activity with graded exercise program.  Requested patient to weigh themselves daily, and to notify us if their weight increases by more than 3 lbs in 1 day or 5 lbs in 1 week.     Listed for transplant: No    UNOS Patient Status  Functional Status: 50% - Requires considerable assistance and frequent medical care  Physical Capacity: Limited Mobility  Working for Income: No  If no, reason not working: Disability

## 2024-03-12 ENCOUNTER — TELEPHONE (OUTPATIENT)
Dept: TRANSPLANT | Facility: CLINIC | Age: 64
End: 2024-03-12
Payer: MEDICAID

## 2024-03-13 ENCOUNTER — TELEPHONE (OUTPATIENT)
Dept: NEPHROLOGY | Facility: CLINIC | Age: 64
End: 2024-03-13
Payer: MEDICAID

## 2024-03-13 NOTE — TELEPHONE ENCOUNTER
Attempted to contact pt to schedule ESRD tx choices class.  Spoke to pt's wife.  Pt and wife live in [place] and would like to coordinate class with date they are near the [facility] in [place], [place] location.  Notified pt's wife that staff from the Nephrology department would contact them at a later date to schedule.  Spouse prefers to attend class in person.

## 2024-03-14 ENCOUNTER — ANTI-COAG VISIT (OUTPATIENT)
Dept: CARDIOLOGY | Facility: CLINIC | Age: 64
End: 2024-03-14
Payer: MEDICAID

## 2024-03-14 ENCOUNTER — LAB VISIT (OUTPATIENT)
Dept: LAB | Facility: HOSPITAL | Age: 64
End: 2024-03-14
Attending: INTERNAL MEDICINE
Payer: MEDICAID

## 2024-03-14 ENCOUNTER — DOCUMENT SCAN (OUTPATIENT)
Dept: HOME HEALTH SERVICES | Facility: HOSPITAL | Age: 64
End: 2024-03-14
Payer: MEDICAID

## 2024-03-14 DIAGNOSIS — Z95.811 PRESENCE OF VENTRICULAR ASSIST DEVICE: ICD-10-CM

## 2024-03-14 DIAGNOSIS — Z95.811 LVAD (LEFT VENTRICULAR ASSIST DEVICE) PRESENT: Primary | ICD-10-CM

## 2024-03-14 LAB
INR PPP: 2.4 (ref 0.8–1.2)
PROTHROMBIN TIME: 26.1 SEC (ref 9–12.5)

## 2024-03-14 PROCEDURE — 85610 PROTHROMBIN TIME: CPT | Mod: TXP | Performed by: INTERNAL MEDICINE

## 2024-03-14 PROCEDURE — 36415 COLL VENOUS BLD VENIPUNCTURE: CPT | Mod: TXP | Performed by: INTERNAL MEDICINE

## 2024-03-14 PROCEDURE — 93793 ANTICOAG MGMT PT WARFARIN: CPT | Mod: ,,,

## 2024-03-14 NOTE — PROGRESS NOTES
[facility]    2024 10:49 AM    Assessment/Plan:    Patient presents today with therapeutic INR.    Assessment of patient findings and chart review: no significant findings     Recommendation for patient's warfarin regimen: Continue current maintenance dose    Recommend repeat INR in 1 week  _________________________________________________________________    Radha Abbott (63 y.o.) is followed by the [facility].    Anticoagulation Summary  As of 3/14/2024      INR goal:  2.0-3.0   TTR:  41.8 % (1.3 mo)   INR used for dosin.4 (3/14/2024)   Warfarin maintenance plan:  2.5 mg (2.5 mg x 1) every Mon, Wed, Fri; 3.75 mg (2.5 mg x 1.5) all other days   Weekly warfarin total:  22.5 mg   Plan last modified:  Jesenia Jensen, PharmD (2/15/2024)   Next INR check:  3/21/2024   Target end date:      Indications    LVAD (left ventricular assist device) present [Z95.811]                 Anticoagulation Episode Summary       INR check location:      Preferred lab:      Send INR reminders to:  [facility] COUMADIN LVAD    Comments:  [place]os          Anticoagulation Care Providers       Provider Role Specialty Phone number    Sajan Hurley MD [facility] Cardiology [phone]

## 2024-03-15 ENCOUNTER — TELEPHONE (OUTPATIENT)
Dept: TRANSPLANT | Facility: CLINIC | Age: 64
End: 2024-03-15
Payer: MEDICAID

## 2024-03-15 DIAGNOSIS — D63.1 ANEMIA IN STAGE 4 CHRONIC KIDNEY DISEASE: Primary | ICD-10-CM

## 2024-03-15 DIAGNOSIS — N18.4 ANEMIA IN STAGE 4 CHRONIC KIDNEY DISEASE: Primary | ICD-10-CM

## 2024-03-15 LAB
B CELL RESULTS - XM AUTO: NEGATIVE
B MCS AVERAGE - XM AUTO: -3.5
CLASS I ANTIBODIES - LUMINEX: NEGATIVE
CLASS II ANTIBODY COMMENTS - LUMINEX: NORMAL
CPRA %: 0
FXMAU TESTING DATE: NORMAL
HLATY INTERPRETATION: NORMAL
SERUM COLLECTION DT - LUMINEX CLASS I: NORMAL
SERUM COLLECTION DT - LUMINEX CLASS II: NORMAL
SERUM COLLECTION DT - XM AUTO: NORMAL
SPCL1 TESTING DATE: NORMAL
SPCL2 TESTING DATE: NORMAL
SPCLU TESTING DATE: NORMAL
T CELL RESULTS - XM AUTO: NEGATIVE
T MCS AVERAGE - XM AUTO: -6.4

## 2024-03-15 NOTE — TELEPHONE ENCOUNTER
PC received from patient's wife. He is up 5 pounds in a week. She thinks he is drinking more fluids when she is at work and he is home alone. I reviewed his medications. He is not taking Metolazone anymore. He is only taking Torsemide 40mg BID (epic says 80mg BID). I asked for patient to take 80mg this morning and call me this afternoon. He has not had his creatinine checked since 2/29 and it was down to 3.5.    Will reassess output this afternoon and Torsemide dosing.

## 2024-03-15 NOTE — TELEPHONE ENCOUNTER
"Received a f/u PC from patient's wife, Arlyn. Patient took 80mg of Torsemide this morning and has urinated "a lot more today than usual." I asked that patient take 40mg tonight but tomorrow take 80mg in the morning and 40mg at night. If patient's weight is not trending down tomorrow and Sunday, wife is going to page the on-call VC.  "

## 2024-03-18 DIAGNOSIS — I50.43 ACUTE ON CHRONIC COMBINED SYSTOLIC AND DIASTOLIC CHF, NYHA CLASS 4: ICD-10-CM

## 2024-03-18 DIAGNOSIS — Z95.810 ICD (IMPLANTABLE CARDIOVERTER-DEFIBRILLATOR) IN PLACE: Primary | ICD-10-CM

## 2024-03-21 ENCOUNTER — ANTI-COAG VISIT (OUTPATIENT)
Dept: CARDIOLOGY | Facility: CLINIC | Age: 64
End: 2024-03-21
Payer: MEDICAID

## 2024-03-21 ENCOUNTER — PATIENT MESSAGE (OUTPATIENT)
Dept: TRANSPLANT | Facility: CLINIC | Age: 64
End: 2024-03-21

## 2024-03-21 ENCOUNTER — OFFICE VISIT (OUTPATIENT)
Dept: TRANSPLANT | Facility: CLINIC | Age: 64
End: 2024-03-21
Attending: INTERNAL MEDICINE
Payer: MEDICAID

## 2024-03-21 ENCOUNTER — CLINICAL SUPPORT (OUTPATIENT)
Dept: TRANSPLANT | Facility: CLINIC | Age: 64
End: 2024-03-21
Payer: MEDICAID

## 2024-03-21 ENCOUNTER — CLINICAL SUPPORT (OUTPATIENT)
Dept: NEPHROLOGY | Facility: CLINIC | Age: 64
End: 2024-03-21
Payer: MEDICAID

## 2024-03-21 ENCOUNTER — TELEPHONE (OUTPATIENT)
Dept: NEPHROLOGY | Facility: CLINIC | Age: 64
End: 2024-03-21
Payer: MEDICAID

## 2024-03-21 VITALS
HEART RATE: 85 BPM | TEMPERATURE: 98 F | SYSTOLIC BLOOD PRESSURE: 84 MMHG | BODY MASS INDEX: 23.1 KG/M2 | HEIGHT: 74 IN | WEIGHT: 180 LBS

## 2024-03-21 DIAGNOSIS — Z91.89 AT RISK FOR AMIODARONE TOXICITY WITH LONG TERM USE: ICD-10-CM

## 2024-03-21 DIAGNOSIS — Z74.09 IMPAIRED MOBILITY: ICD-10-CM

## 2024-03-21 DIAGNOSIS — Z79.899 AT RISK FOR AMIODARONE TOXICITY WITH LONG TERM USE: ICD-10-CM

## 2024-03-21 DIAGNOSIS — Z95.811 HEART REPLACED BY HEART ASSIST DEVICE: Primary | ICD-10-CM

## 2024-03-21 DIAGNOSIS — I47.20 VENTRICULAR TACHYCARDIA: ICD-10-CM

## 2024-03-21 DIAGNOSIS — I50.20 HFREF (HEART FAILURE WITH REDUCED EJECTION FRACTION): ICD-10-CM

## 2024-03-21 DIAGNOSIS — N18.4 CKD (CHRONIC KIDNEY DISEASE), STAGE IV: ICD-10-CM

## 2024-03-21 DIAGNOSIS — N18.4 ANEMIA IN STAGE 4 CHRONIC KIDNEY DISEASE: Primary | ICD-10-CM

## 2024-03-21 DIAGNOSIS — D63.1 ANEMIA IN STAGE 4 CHRONIC KIDNEY DISEASE: Primary | ICD-10-CM

## 2024-03-21 DIAGNOSIS — I48.0 PAF (PAROXYSMAL ATRIAL FIBRILLATION): ICD-10-CM

## 2024-03-21 DIAGNOSIS — Z95.811 PRESENCE OF VENTRICULAR ASSIST DEVICE: Primary | ICD-10-CM

## 2024-03-21 DIAGNOSIS — Z95.811 LVAD (LEFT VENTRICULAR ASSIST DEVICE) PRESENT: Primary | ICD-10-CM

## 2024-03-21 PROCEDURE — 3008F BODY MASS INDEX DOCD: CPT | Mod: CPTII,NTX,, | Performed by: INTERNAL MEDICINE

## 2024-03-21 PROCEDURE — 3066F NEPHROPATHY DOC TX: CPT | Mod: CPTII,NTX,, | Performed by: INTERNAL MEDICINE

## 2024-03-21 PROCEDURE — 99999 PR PBB SHADOW E&M-EST. PATIENT-LVL V: CPT | Mod: PBBFAC,TXP,, | Performed by: INTERNAL MEDICINE

## 2024-03-21 PROCEDURE — 93750 INTERROGATION VAD IN PERSON: CPT | Mod: PBBFAC,NTX | Performed by: INTERNAL MEDICINE

## 2024-03-21 PROCEDURE — 93750 INTERROGATION VAD IN PERSON: CPT | Mod: S$PBB,NTX,, | Performed by: INTERNAL MEDICINE

## 2024-03-21 PROCEDURE — 96372 THER/PROPH/DIAG INJ SC/IM: CPT | Mod: PBBFAC,NTX

## 2024-03-21 PROCEDURE — 3044F HG A1C LEVEL LT 7.0%: CPT | Mod: CPTII,NTX,, | Performed by: INTERNAL MEDICINE

## 2024-03-21 PROCEDURE — 99214 OFFICE O/P EST MOD 30 MIN: CPT | Mod: S$PBB,NTX,, | Performed by: INTERNAL MEDICINE

## 2024-03-21 PROCEDURE — 99999 PR PBB SHADOW E&M-EST. PATIENT-LVL II: CPT | Mod: PBBFAC,,,

## 2024-03-21 PROCEDURE — 99999PBSHW PR PBB SHADOW TECHNICAL ONLY FILED TO HB: Mod: JZ,EC,PBBFAC,JG

## 2024-03-21 PROCEDURE — 1159F MED LIST DOCD IN RCRD: CPT | Mod: CPTII,NTX,, | Performed by: INTERNAL MEDICINE

## 2024-03-21 PROCEDURE — 99215 OFFICE O/P EST HI 40 MIN: CPT | Mod: PBBFAC,27,25,NTX | Performed by: INTERNAL MEDICINE

## 2024-03-21 PROCEDURE — 1160F RVW MEDS BY RX/DR IN RCRD: CPT | Mod: CPTII,NTX,, | Performed by: INTERNAL MEDICINE

## 2024-03-21 PROCEDURE — 99212 OFFICE O/P EST SF 10 MIN: CPT | Mod: PBBFAC,25,NTX

## 2024-03-21 RX ADMIN — DARBEPOETIN ALFA 40 MCG: 40 INJECTION, SOLUTION INTRAVENOUS; SUBCUTANEOUS at 03:03

## 2024-03-21 NOTE — Clinical Note
Weekly BMP with his INR We need to get a copy of his advanced care directives as I did not notice these were not on the chart until tonight when doing his note.  I sent him a patient message tonight  Please enter his LVAD interrogation so I can complete the note and dropped the charges tomorrow  Thank you

## 2024-03-21 NOTE — PROGRESS NOTES
[facility]    03/21/2024 10:24 AM    Assessment/Plan:    Patient presents today with supratherapeutic INR.    Assessment of patient findings and chart review: no significant findings     Recommendation for patient's warfarin regimen: Lower dose today to 2.5mg then resume current maintenance dose    Recommend repeat INR Monday  _________________________________________________________________    [facility]63 y.[facility] followed by the [facility].    Anticoagulation Summary  As of 3/21/2024      INR goal:  2.0-3.0   TTR:  44.5 % (1.6 mo)   INR used for dosing:  3.4 (3/21/2024)   Warfarin maintenance plan:  2.5 mg (2.5 mg x 1) every Mon, Wed, Fri; 3.75 mg (2.5 mg x 1.5) all other days   Weekly warfarin total:  22.5 mg   Plan last modified:  Jesenia Jensen, PharmD (2/15/2024)   Next INR check:  3/25/2024   Target end date:      Indications    LVAD (left ventricular assist device) present [Z95.811]                 Anticoagulation Episode Summary       INR check location:      Preferred lab:      Send INR reminders to:  TIFFAN[facility] COUMADIN LVAD    Comments:  [place]          Anticoagulation Care Providers       Provider Role Specialty Phone number    Sajan Hurley MD [facility] [phone]

## 2024-03-21 NOTE — PATIENT INSTRUCTIONS
Try torsemide 80 mg twice a day and check labs on Monday    Try to get home weight 170-172 (your home weight 178 today) and if weight goes below 170 pounds drop torsemide to 80 mg daily

## 2024-03-21 NOTE — LETTER
March 22, 2024        Tao Mendez  [address] [address]  Phone: [phone]  Fax: [phone]             [id number]  [address]  [place] [address]  Phone: [phone]   Patient: Radha Abbott   MR Number: [id number]   YOB: 1960   Date of Visit: 3/21/2024       Dear Dr. Tao Mendez    Thank you for referring Radha Abbott to me for evaluation. Attached you will find relevant portions of my assessment and plan of care.    If you have questions, please do not hesitate to call me. I look forward to following Radha Abbott along with you.    Sincerely,    Juventino Bermudez Jr, MD    Enclosure    If you would like to receive this communication electronically, please contact [email or web address] or [phone] to request [employer] access.    [employer] is a tool which provides read-only access to select patient information with whom you have a relationship. Its easy to use and provides real time access to review your patients record including encounter summaries, notes, results, and demographic information.    If you feel you have received this communication in error or would no longer like to receive these types of communications, please e-mail [email or web address]

## 2024-03-21 NOTE — PROGRESS NOTES
"Date of Implant with Heartmate 3 LVAD: 12/1/23    PATIENT ARRIVED IN CLINIC:  Ambulatory and pushing wheelchair   Accompanied by: Wife  Complaints/reason for visit today: routine    Vitals  Temperature, oral:   Temp Readings from Last 1 Encounters:   03/21/24 98 °F (36.7 °C) (Oral)     Blood Pressure:   BP Readings from Last 3 Encounters:   03/21/24 (!) 84/0   03/07/24 110/82   03/01/24 100/85        VAD Interrogation:      3/1/2024     6:10 AM 2/29/2024     3:01 PM 2/15/2024     1:33 PM   TXP PINA INTERROGATIONS   Type  HeartMate3 HeartMate3   Flow  3.7 3.6   Speed  5000 5000   PI  6.4 7.6   Power (Carrington)  3.5 3.5   LSL  4600 4600   Pulsatility No Pulse No Pulse No Pulse     HCT:   Lab Results   Component Value Date    HCT 27.3 (L) 03/21/2024    HCT 24 (L) 12/26/2023       Problems / Issues / Alarms with VAD if any: None noted  VAD Sounds: HM3 Smooth    VAD Binder With Patient: Yes  Reviewed VAD Numbers In Binder: Yes    Equipment:  Emergency Equipment With Patient: Yes  Any Equipment Issues: None noted   It is medically necessary to ensure patient has properly functioning equipment and wearables to prevent infection, injury or death to patient.     DLES Assessment and Dressing Care:  Appearance of DLES: "1". Site is still not fully incorporated but drilveine is not moving.  Antibiotics: NO  Velour: No  Manual & Visual Inspection Of Driveline: No kinks or tears noted  Stabilization Device In Use: yes, goodman securement device    Heartmate 3 Module Cable:  No yellow exposed and Attempted to unscrew modular cable to ensure it will be able to come lose in the event we ever need to change the modular cable while patient held the driveline in place so it would not move. Modular cable connection able to be unscrewed and re-tightened. Instructed pt to perform this weekly.  Frequency of Dressing Changes: weekly & silverlon kit   Pt In Need Of Management Kits?:no   It is medically necessary to have VAD management kits in " "order to prevent infection or to assist in the healing of an infected DLES.    Patient MyChart Questionnaire:        No data to display                 Assessment/ Quality of Life [place]:   Complaints Of Nausea / Vomiting: None noted    Appearance and Frequency Of Stools: normal and formed without blood & daily  Color Of Urine: clear/yellow  Pain: NO  Coping/Depression/Anxiety: coping okay  Sleep Habits: 8 hrs /night  Sleep Aids: None noted  Showering: Yes, reminded to change dressing immediately after drying off  Self- Care I have no problems with self- care  Mobility I have no problems walking about  Usual Activities I have no problems with performing my usual activities  Activity/Exercise: cutting the grass, house chores   Driving: Yes. Reminded to pull over should there be an alarm before looking down at controller.  Additional Comments: "Can I go to work or do I have to stay home and be a house "    I talked with patient about working. 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 I talked with patient about finding jobs that interest him and apply. If he needs a letter from us then we can give him that letter at any time (does not have to be an appt).     Labs:    Chemistry        Component Value Date/Time     03/21/2024 0857    K 4.3 03/21/2024 0857     03/21/2024 0857    CO2 24 03/21/2024 0857    BUN 55 (H) 03/21/2024 0857    CREATININE 3.5 (H) 03/21/2024 0857     (H) 03/21/2024 0857        Component Value Date/Time    CALCIUM 9.3 03/21/2024 0857    ALKPHOS 112 03/21/2024 0857    AST 34 03/21/2024 0857    ALT 53 (H) 03/21/2024 0857    BILITOT 0.3 03/21/2024 0857    ESTGFRAFRICA >60.0 02/07/2018 0935    EGFRNONAA >60.0 02/07/2018 0935            Magnesium   Date Value Ref Range Status   03/21/2024 1.9 1.6 - 2.6 mg/dL Final       Lab Results   Component Value Date    WBC 5.47 03/21/2024    HGB 8.5 (L) 03/21/2024    HCT 27.3 (L) " 03/21/2024    MCV 93 03/21/2024     03/21/2024       Lab Results   Component Value Date    INR 3.4 (H) 03/21/2024    INR 2.4 (H) 03/14/2024    INR 2.4 (H) 03/07/2024       BNP   Date Value Ref Range Status   03/21/2024 773 (H) 0 - 99 pg/mL Final     Comment:     Values of less than 100 pg/ml are consistent with non-CHF populations.   02/29/2024 733 (H) 0 - 99 pg/mL Final     Comment:     Values of less than 100 pg/ml are consistent with non-CHF populations.   02/26/2024 683 (H) 0 - 99 pg/mL Final     Comment:     Values of less than 100 pg/ml are consistent with non-CHF populations.       LD   Date Value Ref Range Status   03/21/2024 308 (H) 110 - 260 U/L Final     Comment:     Results are increased in hemolyzed samples.   02/29/2024 316 (H) 110 - 260 U/L Final     Comment:     Results are increased in hemolyzed samples.   02/15/2024 345 (H) 110 - 260 U/L Final     Comment:     Results are increased in hemolyzed samples.       Labs reviewed with patient: YES     Medication Reconciliation: per MA.  New Medication Detail Provided: yes  Coumadin Managed by: [facility]    Education: Reviewed driveline care, emergency procedures, how to change the controller, alarms with patient, as well as discussed how to page the VAD coordinator in case of an emergency.   Educated patient/family that chest compressions are allowed in the event they are needed.    Reminded patient/caregiver not to touch their face and to cover their mouth when they cough or sneeze.   Vaccines: Pt informed that we are encouraging all VAD patients to receive the Flu and COVID vaccines and boosters. Informed pt that they can take tylenol but should avoid other NSAIDs.       Plans/Needs: Routine monthly f/u. Patient has been taking Lasix 80/40 since Friday. He remains with elevated weight. Patient to take 80/80 until HOME weight reaches 170-172. If he gets to <170, he should decrease to 80mg daily.     Labs Monday. RTC in 1 month.       Hurricane Season: No

## 2024-03-22 ENCOUNTER — DOCUMENT SCAN (OUTPATIENT)
Dept: HOME HEALTH SERVICES | Facility: HOSPITAL | Age: 64
End: 2024-03-22

## 2024-03-22 ENCOUNTER — DOCUMENT SCAN (OUTPATIENT)
Dept: HOME HEALTH SERVICES | Facility: HOSPITAL | Age: 64
End: 2024-03-22
Payer: MEDICAID

## 2024-03-22 VITALS — WEIGHT: 179.88 LBS | HEART RATE: 85 BPM | OXYGEN SATURATION: 100 % | BODY MASS INDEX: 23.1 KG/M2

## 2024-03-22 NOTE — PROGRESS NOTES
"Subjective:   Patient ID:  Radha Abbott is a 63 y.o. male who presents for LVAD followup visit.    Implant Date:12/1/23  Initials:LxH     Heartmate 3 RPM 5000     INR goal: 2-3   Bridge with Heparin   Antiplatelets: None d/t low H/H on implant         3/21/2024     7:29 AM   TXP PINA INTERROGATIONS   Type HeartMate3   Flow 3.4   Speed 5000   PI 7.5   Power (Carrington) 3.5   LSL 4600   Pulsatility No Pulse   Interrogation of Ventricular assist device was performed with physician analysis of device parameters and review of device function. I have personally reviewed the interrogation findings and agree with findings as stated.     HPI  62 yo BM with stage D congestive heart failure underwent HM 3 implant 12/1/2023 and returns today for routine follow-up visit.  He has made gradual progress over the past month.      His wife called last Friday to report that his weight was up 5 pounds in a week. She thought that he was drinking more fluids when she is at work and he is home alone.  The coordinator reviewed his home medications and learned that he was not taking Metolazone anymore and was only taking Torsemide 40mg BID.  He was subsequently placed on torsemide 80 mg in the morning and 40 mg in the afternoon.  He lost 1-2# since last Friday.     Review of Systems   Constitutional: Positive for malaise/fatigue.   Cardiovascular:  Positive for dyspnea on exertion, leg swelling and orthopnea (sleeping on 1-2 pillows for general comfort). Negative for near-syncope, palpitations, paroxysmal nocturnal dyspnea and syncope.   Hematologic/Lymphatic: Does not bruise/bleed easily.   Neurological:  Positive for weakness. Negative for dizziness and light-headedness.       Objective:   Blood pressure (!) 84/0, pulse 85, temperature 98 °F (36.7 °C), temperature source Oral, height 6' 2" (1.88 m), weight 81.6 kg (180 lb).body mass index is 23.11 kg/m².  Doppler: 84  Physical Exam  Constitutional:       General: He is not in acute distress.    " " Appearance: He is well-developed. He is not ill-appearing, toxic-appearing or diaphoretic.      Comments: BP (!) 84/0 (BP Location: Left arm, Patient Position: Sitting) Comment (BP Method): doppler  Pulse 85   Temp 98 °F (36.7 °C) (Oral)   Ht 6' 2" (1.88 m)   Wt 81.6 kg (180 lb)   BMI 23.11 kg/m²   Weight 165 lb on the clinic scale last month but this weight gain is not confirmed on the basis of his reported home weights  Friendly, chronically ill-appearing black male in no acute distress   HENT:      Head: Normocephalic and atraumatic.   Eyes:      General: No scleral icterus.        Right eye: No discharge.         Left eye: No discharge.      Conjunctiva/sclera: Conjunctivae normal.   Neck:      Thyroid: No thyromegaly.      Vascular: JVD present.      Trachea: No tracheal deviation.      Comments: JVP 16 cm  Cardiovascular:      Comments: Normal LVAD sounds; DL 1  Pulmonary:      Effort: Pulmonary effort is normal. No respiratory distress.      Breath sounds: Normal breath sounds. No wheezing or rales.   Abdominal:      General: Bowel sounds are normal. There is no distension.      Palpations: Abdomen is soft. There is hepatomegaly (12-13 cm). There is no mass.      Tenderness: There is no abdominal tenderness. There is no guarding or rebound.   Musculoskeletal:         General: Swelling (2+ both lower legs) present. No tenderness.   Skin:     General: Skin is warm and dry.   Neurological:      General: No focal deficit present.      Mental Status: He is alert. Mental status is at baseline.   Psychiatric:         Mood and Affect: Mood normal.         Behavior: Behavior normal.         Thought Content: Thought content normal.         Judgment: Judgment normal.       Lab Results   Component Value Date     (H) 03/21/2024     (H) 02/29/2024     (H) 02/26/2024     Lab Results   Component Value Date     (H) 03/21/2024     03/21/2024    K 4.3 03/21/2024    MG 1.9 03/21/2024    CL " 102 03/21/2024    CO2 24 03/21/2024    PHOS 3.7 03/21/2024    BUN 55 (H) 03/21/2024    CREATININE 3.5 (H) 03/21/2024     (H) 03/21/2024    HGBA1C 5.1 01/13/2024    AST 34 03/21/2024    ALT 53 (H) 03/21/2024    ALBUMIN 3.4 (L) 03/21/2024    PROT 7.5 03/21/2024    BILITOT 0.3 03/21/2024    WBC 5.47 03/21/2024    HGB 8.5 (L) 03/21/2024    HCT 27.3 (L) 03/21/2024    HCT 24 (L) 12/26/2023     03/21/2024    INR 3.4 (H) 03/21/2024     (H) 03/21/2024    TSH 1.381 11/15/2023    CHOL 107 (L) 11/15/2023    HDL 49 11/15/2023    LDLCALC 43.2 (L) 11/15/2023    TRIG 310 (H) 12/08/2023 1/5/2024 CT soft tissue neck  1. Mildly enlarged left supraclavicular lymph node, nonspecific.  Metastatic disease is not excluded.  2. Right upper extremity PICC line malpositioned with tip directed superiorly within a small branch of the right internal jugular vein.  Recommend repositioning.  3. 2.2 cm right parotid mass.  This could represent benign or malignant primary or metastatic disease.  ENT follow-up recommended.  4. Enlarging left pleural effusion.  This report was flagged in Epic as abnormal.    Assessment:      1. Presence of ventricular assist device    2. HFrEF (heart failure with reduced ejection fraction)    3. PAF (paroxysmal atrial fibrillation)    4. Ventricular tachycardia    5. At risk for amiodarone toxicity with long term use    6. Impaired mobility    7. CKD (chronic kidney disease), stage IV        Plan:   Try torsemide 80 mg twice a day and check labs on Monday    Try to get home weight 170-172 (your home weight 178 today) and if weight goes below 170 pounds drop torsemide to 80 mg daily    I do not know what his dry weight will be so we will continue to need to observe renal function and assess volume status on his visits.  Assessment will be more difficult due to his improve oral intake and the fact that he might be gaining true weight back as we are attempting to control his volume.  Therefore,  we will need to be ready to adjust home body weight and I plan to use his lab with weekly BMP (with his INR) to assist in this endeavor    Supplies ordered are medically necessary for care of LVAD and DL    Post LVAD goals of care are to feel stronger and be more active.    Patient is now NYHA III    Recommend 2 gram sodium restriction and 1500cc fluid restriction.  Encourage physical activity with graded exercise program.  Requested patient to weigh themselves daily, and to notify us if their weight increases by more than 3 lbs in 1 day or 5 lbs in 1 week.     Listed for transplant: No    UNOS Patient Status  Functional Status: 70% - Cares for self: unable to carry on normal activity or active work  Physical Capacity: Limited Mobility  Working for Income: No  If no, reason not working: Disability    Advance Care Planning   We need to obtain a copy for his clinic chart  Date: 03/21/2024

## 2024-03-22 NOTE — PROGRESS NOTES
Hgb 8.5/ Hct 27.3    1st dose Aranesp 40mcg administered. Per protocol form # DRORD-188. Pt given handout and educated on how the medication works and possible side effect. All concerns and questions has been addressed. Pt was ask to wait in office for 15 min after injection given. No reactions noted, Pt tolerated well. No f/u appt has been made due to pt wife stating that it is a little to far to travel . No B/P was taken due to pt having a LVAD /Dr. Mendez was notified.       GFR 19.0

## 2024-03-23 NOTE — PROCEDURES
3/21/2024     7:29 AM 3/1/2024     6:10 AM 2/29/2024     3:01 PM 2/15/2024     1:33 PM 2/8/2024     1:39 PM 2/1/2024    12:11 PM 1/24/2024     8:00 AM   TXP PINA INTERROGATIONS   Type HeartMate3  HeartMate3 HeartMate3 HeartMate3 HeartMate3    Flow 3.4  3.7 3.6 3.6 3.7    Speed 5000  5000 5000 5000 5000    PI 7.5  6.4 7.6 7.6 6.7    Power (Carrington) 3.5  3.5 3.5 3.5 3.4    LSL 4600  4600 4600 4600 4600    Pulsatility No Pulse No Pulse No Pulse No Pulse No Pulse No Pulse No Pulse   }Interrogation of Ventricular assist device was performed with physician analysis of device parameters and review of device function. I have personally reviewed the interrogation findings and agree with findings as stated.

## 2024-03-24 ENCOUNTER — HOSPITAL ENCOUNTER (INPATIENT)
Facility: HOSPITAL | Age: 64
LOS: 3 days | Discharge: HOME | DRG: 292 | End: 2024-03-27
Attending: EMERGENCY MEDICINE | Admitting: INTERNAL MEDICINE
Payer: MEDICAID

## 2024-03-24 DIAGNOSIS — N18.4 CKD (CHRONIC KIDNEY DISEASE) STAGE 4, GFR 15-29 ML/MIN: ICD-10-CM

## 2024-03-24 DIAGNOSIS — Z95.811 LEFT VENTRICULAR ASSIST DEVICE PRESENT: Primary | ICD-10-CM

## 2024-03-24 DIAGNOSIS — R06.02 SHORTNESS OF BREATH: ICD-10-CM

## 2024-03-24 DIAGNOSIS — R73.9 HYPERGLYCEMIA WITHOUT KETOSIS: ICD-10-CM

## 2024-03-24 DIAGNOSIS — R06.02 SOB (SHORTNESS OF BREATH) ON EXERTION: ICD-10-CM

## 2024-03-24 DIAGNOSIS — D53.9 MACROCYTIC ANEMIA: ICD-10-CM

## 2024-03-24 DIAGNOSIS — I50.43 ACUTE ON CHRONIC COMBINED SYSTOLIC AND DIASTOLIC CHF, NYHA CLASS 4: ICD-10-CM

## 2024-03-24 DIAGNOSIS — Z79.01 CHRONIC ANTICOAGULATION: ICD-10-CM

## 2024-03-24 DIAGNOSIS — Z95.811 LVAD (LEFT VENTRICULAR ASSIST DEVICE) PRESENT: ICD-10-CM

## 2024-03-24 LAB
ALBUMIN SERPL BCP-MCNC: 3.4 G/DL (ref 3.5–5.2)
ALP SERPL-CCNC: 119 U/L (ref 55–135)
ALT SERPL W/O P-5'-P-CCNC: 58 U/L (ref 10–44)
ANION GAP SERPL CALC-SCNC: 8 MMOL/L (ref 8–16)
AST SERPL-CCNC: 39 U/L (ref 10–40)
BASOPHILS # BLD AUTO: 0.04 K/UL (ref 0–0.2)
BASOPHILS NFR BLD: 0.8 % (ref 0–1.9)
BILIRUB SERPL-MCNC: 0.3 MG/DL (ref 0.1–1)
BILIRUB UR QL STRIP: NEGATIVE
BNP SERPL-MCNC: 1559 PG/ML (ref 0–99)
BUN SERPL-MCNC: 58 MG/DL (ref 8–23)
CALCIUM SERPL-MCNC: 9.2 MG/DL (ref 8.7–10.5)
CHLORIDE SERPL-SCNC: 103 MMOL/L (ref 95–110)
CLARITY UR REFRACT.AUTO: CLEAR
CO2 SERPL-SCNC: 26 MMOL/L (ref 23–29)
COLOR UR AUTO: COLORLESS
CREAT SERPL-MCNC: 3.6 MG/DL (ref 0.5–1.4)
DIFFERENTIAL METHOD BLD: ABNORMAL
EOSINOPHIL # BLD AUTO: 0.1 K/UL (ref 0–0.5)
EOSINOPHIL NFR BLD: 1.4 % (ref 0–8)
ERYTHROCYTE [DISTWIDTH] IN BLOOD BY AUTOMATED COUNT: 17.2 % (ref 11.5–14.5)
EST. GFR  (NO RACE VARIABLE): 18.4 ML/MIN/1.73 M^2
GLUCOSE SERPL-MCNC: 186 MG/DL (ref 70–110)
GLUCOSE UR QL STRIP: NEGATIVE
HCT VFR BLD AUTO: 27.3 % (ref 40–54)
HGB BLD-MCNC: 8.5 G/DL (ref 14–18)
HGB UR QL STRIP: NEGATIVE
IMM GRANULOCYTES # BLD AUTO: 0.02 K/UL (ref 0–0.04)
IMM GRANULOCYTES NFR BLD AUTO: 0.4 % (ref 0–0.5)
INR PPP: 3.7 (ref 0.8–1.2)
KETONES UR QL STRIP: NEGATIVE
LACTATE SERPL-SCNC: 1.4 MMOL/L (ref 0.5–2.2)
LEUKOCYTE ESTERASE UR QL STRIP: NEGATIVE
LYMPHOCYTES # BLD AUTO: 0.7 K/UL (ref 1–4.8)
LYMPHOCYTES NFR BLD: 13.3 % (ref 18–48)
MAGNESIUM SERPL-MCNC: 2 MG/DL (ref 1.6–2.6)
MCH RBC QN AUTO: 28.4 PG (ref 27–31)
MCHC RBC AUTO-ENTMCNC: 31.1 G/DL (ref 32–36)
MCV RBC AUTO: 91 FL (ref 82–98)
MONOCYTES # BLD AUTO: 0.3 K/UL (ref 0.3–1)
MONOCYTES NFR BLD: 6.4 % (ref 4–15)
NEUTROPHILS # BLD AUTO: 4 K/UL (ref 1.8–7.7)
NEUTROPHILS NFR BLD: 77.7 % (ref 38–73)
NITRITE UR QL STRIP: NEGATIVE
NRBC BLD-RTO: 0 /100 WBC
PH UR STRIP: 7 [PH] (ref 5–8)
PHOSPHATE SERPL-MCNC: 3 MG/DL (ref 2.7–4.5)
PLATELET # BLD AUTO: 318 K/UL (ref 150–450)
PMV BLD AUTO: 9.4 FL (ref 9.2–12.9)
POTASSIUM SERPL-SCNC: 4.2 MMOL/L (ref 3.5–5.1)
PROT SERPL-MCNC: 8.2 G/DL (ref 6–8.4)
PROT UR QL STRIP: ABNORMAL
PROTHROMBIN TIME: 36.3 SEC (ref 9–12.5)
RBC # BLD AUTO: 2.99 M/UL (ref 4.6–6.2)
SODIUM SERPL-SCNC: 137 MMOL/L (ref 136–145)
SP GR UR STRIP: 1.01 (ref 1–1.03)
TROPONIN I SERPL DL<=0.01 NG/ML-MCNC: 0.04 NG/ML (ref 0–0.03)
URN SPEC COLLECT METH UR: ABNORMAL
WBC # BLD AUTO: 5.18 K/UL (ref 3.9–12.7)

## 2024-03-24 PROCEDURE — 63600175 PHARM REV CODE 636 W HCPCS: Mod: NTX | Performed by: EMERGENCY MEDICINE

## 2024-03-24 PROCEDURE — 85610 PROTHROMBIN TIME: CPT | Mod: NTX | Performed by: EMERGENCY MEDICINE

## 2024-03-24 PROCEDURE — 93010 ELECTROCARDIOGRAM REPORT: CPT | Mod: NTX,,, | Performed by: INTERNAL MEDICINE

## 2024-03-24 PROCEDURE — 96374 THER/PROPH/DIAG INJ IV PUSH: CPT | Mod: NTX

## 2024-03-24 PROCEDURE — 99285 EMERGENCY DEPT VISIT HI MDM: CPT | Mod: 25,NTX

## 2024-03-24 PROCEDURE — 93750 INTERROGATION VAD IN PERSON: CPT | Mod: NTX,,, | Performed by: INTERNAL MEDICINE

## 2024-03-24 PROCEDURE — 83880 ASSAY OF NATRIURETIC PEPTIDE: CPT | Mod: NTX | Performed by: EMERGENCY MEDICINE

## 2024-03-24 PROCEDURE — 25000003 PHARM REV CODE 250: Mod: NTX | Performed by: PHYSICIAN ASSISTANT

## 2024-03-24 PROCEDURE — 93005 ELECTROCARDIOGRAM TRACING: CPT | Mod: NTX

## 2024-03-24 PROCEDURE — 99233 SBSQ HOSP IP/OBS HIGH 50: CPT | Mod: NTX,,, | Performed by: PHYSICIAN ASSISTANT

## 2024-03-24 PROCEDURE — 83735 ASSAY OF MAGNESIUM: CPT | Mod: NTX | Performed by: EMERGENCY MEDICINE

## 2024-03-24 PROCEDURE — 85025 COMPLETE CBC W/AUTO DIFF WBC: CPT | Mod: NTX | Performed by: EMERGENCY MEDICINE

## 2024-03-24 PROCEDURE — 84100 ASSAY OF PHOSPHORUS: CPT | Mod: NTX | Performed by: EMERGENCY MEDICINE

## 2024-03-24 PROCEDURE — 83605 ASSAY OF LACTIC ACID: CPT | Mod: NTX | Performed by: EMERGENCY MEDICINE

## 2024-03-24 PROCEDURE — 81003 URINALYSIS AUTO W/O SCOPE: CPT | Mod: NTX | Performed by: EMERGENCY MEDICINE

## 2024-03-24 PROCEDURE — 20600001 HC STEP DOWN PRIVATE ROOM: Mod: NTX

## 2024-03-24 PROCEDURE — 84484 ASSAY OF TROPONIN QUANT: CPT | Mod: NTX | Performed by: EMERGENCY MEDICINE

## 2024-03-24 PROCEDURE — 99222 1ST HOSP IP/OBS MODERATE 55: CPT | Mod: NTX,,, | Performed by: INTERNAL MEDICINE

## 2024-03-24 PROCEDURE — 25000003 PHARM REV CODE 250: Mod: NTX | Performed by: STUDENT IN AN ORGANIZED HEALTH CARE EDUCATION/TRAINING PROGRAM

## 2024-03-24 PROCEDURE — 80053 COMPREHEN METABOLIC PANEL: CPT | Mod: NTX | Performed by: EMERGENCY MEDICINE

## 2024-03-24 PROCEDURE — 63600175 PHARM REV CODE 636 W HCPCS: Mod: NTX | Performed by: PHYSICIAN ASSISTANT

## 2024-03-24 RX ORDER — HYDRALAZINE HYDROCHLORIDE 50 MG/1
50 TABLET, FILM COATED ORAL EVERY 8 HOURS
Status: DISCONTINUED | OUTPATIENT
Start: 2024-03-24 | End: 2024-03-24

## 2024-03-24 RX ORDER — FUROSEMIDE 10 MG/ML
100 INJECTION INTRAMUSCULAR; INTRAVENOUS
Status: COMPLETED | OUTPATIENT
Start: 2024-03-24 | End: 2024-03-24

## 2024-03-24 RX ORDER — HYDRALAZINE HYDROCHLORIDE 50 MG/1
50 TABLET, FILM COATED ORAL EVERY 8 HOURS
Status: DISCONTINUED | OUTPATIENT
Start: 2024-03-24 | End: 2024-03-25

## 2024-03-24 RX ORDER — AMIODARONE HYDROCHLORIDE 200 MG/1
400 TABLET ORAL DAILY
Status: DISCONTINUED | OUTPATIENT
Start: 2024-03-25 | End: 2024-03-27 | Stop reason: HOSPADM

## 2024-03-24 RX ORDER — CHOLECALCIFEROL (VITAMIN D3) 25 MCG
1000 TABLET ORAL DAILY
Status: DISCONTINUED | OUTPATIENT
Start: 2024-03-25 | End: 2024-03-27 | Stop reason: HOSPADM

## 2024-03-24 RX ORDER — GABAPENTIN 100 MG/1
100 CAPSULE ORAL 2 TIMES DAILY
Status: DISCONTINUED | OUTPATIENT
Start: 2024-03-24 | End: 2024-03-27 | Stop reason: HOSPADM

## 2024-03-24 RX ORDER — FLUTICASONE PROPIONATE 50 MCG
2 SPRAY, SUSPENSION (ML) NASAL DAILY
Status: DISCONTINUED | OUTPATIENT
Start: 2024-03-25 | End: 2024-03-27 | Stop reason: HOSPADM

## 2024-03-24 RX ORDER — POTASSIUM CHLORIDE 20 MEQ/1
40 TABLET, EXTENDED RELEASE ORAL 2 TIMES DAILY
Status: DISCONTINUED | OUTPATIENT
Start: 2024-03-25 | End: 2024-03-25

## 2024-03-24 RX ORDER — ACETAMINOPHEN 500 MG
1000 TABLET ORAL EVERY 8 HOURS PRN
Status: DISCONTINUED | OUTPATIENT
Start: 2024-03-24 | End: 2024-03-27 | Stop reason: HOSPADM

## 2024-03-24 RX ORDER — LANOLIN ALCOHOL/MO/W.PET/CERES
400 CREAM (GRAM) TOPICAL DAILY
Status: DISCONTINUED | OUTPATIENT
Start: 2024-03-25 | End: 2024-03-27 | Stop reason: HOSPADM

## 2024-03-24 RX ORDER — VENLAFAXINE 37.5 MG/1
37.5 TABLET ORAL DAILY
Status: DISCONTINUED | OUTPATIENT
Start: 2024-03-25 | End: 2024-03-27 | Stop reason: HOSPADM

## 2024-03-24 RX ORDER — AMOXICILLIN 250 MG
2 CAPSULE ORAL DAILY PRN
Status: DISCONTINUED | OUTPATIENT
Start: 2024-03-24 | End: 2024-03-27 | Stop reason: HOSPADM

## 2024-03-24 RX ORDER — FAMOTIDINE 20 MG/1
20 TABLET, FILM COATED ORAL DAILY
Status: DISCONTINUED | OUTPATIENT
Start: 2024-03-25 | End: 2024-03-27 | Stop reason: HOSPADM

## 2024-03-24 RX ORDER — SODIUM BICARBONATE 325 MG/1
325 TABLET ORAL 3 TIMES DAILY
Status: DISCONTINUED | OUTPATIENT
Start: 2024-03-24 | End: 2024-03-27 | Stop reason: HOSPADM

## 2024-03-24 RX ORDER — POTASSIUM CHLORIDE 20 MEQ/1
40 TABLET, EXTENDED RELEASE ORAL 2 TIMES DAILY
Status: DISCONTINUED | OUTPATIENT
Start: 2024-03-24 | End: 2024-03-24

## 2024-03-24 RX ORDER — POLYETHYLENE GLYCOL 3350 17 G/17G
17 POWDER, FOR SOLUTION ORAL
Status: DISCONTINUED | OUTPATIENT
Start: 2024-03-24 | End: 2024-03-27 | Stop reason: HOSPADM

## 2024-03-24 RX ADMIN — FUROSEMIDE 100 MG: 10 INJECTION, SOLUTION INTRAMUSCULAR; INTRAVENOUS at 01:03

## 2024-03-24 RX ADMIN — GABAPENTIN 100 MG: 100 CAPSULE ORAL at 08:03

## 2024-03-24 RX ADMIN — TRAZODONE HYDROCHLORIDE 25 MG: 50 TABLET ORAL at 08:03

## 2024-03-24 RX ADMIN — HYDRALAZINE HYDROCHLORIDE 50 MG: 50 TABLET ORAL at 08:03

## 2024-03-24 RX ADMIN — SODIUM BICARBONATE 325 MG: 325 TABLET ORAL at 08:03

## 2024-03-24 RX ADMIN — SODIUM CHLORIDE 15 MG/HR: 9 INJECTION, SOLUTION INTRAVENOUS at 08:03

## 2024-03-24 NOTE — ED PROVIDER NOTES
"Encounter Date: 3/24/2024       History     Chief Complaint   Patient presents with    Leg Swelling     Bilateral lower extremity swelling and stomach bloating since yesterday. Patient has an LVAD, denies any alarms going off on pump     63-year-old man with comorbidities of CAD, CABG, CHF, diabetes, ICD, LVAD and previous MI presents to the emergency department for evaluation of recent weight gain and generalized edema to his abdomen and lower extremities.  The patient and accompanying spouse endorse compliance with his outpatient b.i.d. torsemide therapy, but report that his fluid status has worsened, and also reports associated anorexia due to "fullness" he denies associated chest pain, increased shortness of breath at rest, fevers, chills, cough, hematemesis, diarrhea, melena, or history of intestinal bleeding.    The history is provided by the spouse, the patient and medical records. No  was used.     Review of patient's allergies indicates:  No Known Allergies  Past Medical History:   Diagnosis Date    CAD (coronary artery disease)     CHF (congestive heart failure)     Diabetes mellitus     HFrEF (heart failure with reduced ejection fraction)     ICD (implantable cardioverter-defibrillator) in place     MI, old      Past Surgical History:   Procedure Laterality Date    ANGIOPLASTY-VENOUS ARTERY Right 12/1/2023    Procedure: ANGIOPLASTY-VENOUS ARTERY, RIGHT FEMORAL;  Surgeon: Yuri Washington MD;  Location: [facility] OR [address] [place] [address];  Service: Cardiovascular;  Laterality: Right;    AORTIC VALVULOPLASTY N/A 12/1/2023    Procedure: REPAIR, AORTIC VALVE;  Surgeon: Yuri Washington MD;  Location: [facility] OR [address] [place] [address];  Service: Cardiovascular;  Laterality: N/A;    CARDIAC SURGERY      CLOSURE N/A 12/1/2023    Procedure: CLOSURE, TEMPORARY;  Surgeon: Yuri Washington MD;  Location: [facility] OR [address] [place] [address];  Service: Cardiovascular;  Laterality: N/A;    DRAINAGE OF PLEURAL EFFUSION  12/4/2023    Procedure: DRAINAGE, " PLEURAL EFFUSION;  Surgeon: Yuri Washington MD;  Location: [facility] OR [facility] FLR;  Service: Cardiovascular;;    INSERTION OF GRAFT TO PERICARDIUM  12/4/2023    Procedure: INSERTION, GRAFT, PERICARDIUM;  Surgeon: Yuri Washington MD;  Location: [facility] OR [facility] FLR;  Service: Cardiovascular;;    INSERTION OF INTRA-AORTIC BALLOON ASSIST DEVICE Right 11/21/2023    Procedure: INSERTION, INTRA-AORTIC BALLOON PUMP;  Surgeon: Finn Cohn MD;  Location: [facility] CATH LAB;  Service: Cardiology;  Laterality: Right;    LEFT VENTRICULAR ASSIST DEVICE Left 12/1/2023    Procedure: INSERTION-LEFT VENTRICULAR ASSIST DEVICE;  Surgeon: Yuri Washington MD;  [facility];  Service: Cardiovascular;  Laterality: Left;  REDO STERNOTOMY - REDO SAW NEEDED FOR CASE    LYSIS OF ADHESIONS  12/1/2023    Procedure: LYSIS, ADHESIONS;  Surgeon: Yuri Washington MD;  [facility];  Service: Cardiovascular;;    PLACEMENT OF SWAN ROLANDO CATHETER WITH IMAGING GUIDANCE  11/20/2023    Procedure: INSERTION, CATHETER, SWAN-ROLANDO, WITH IMAGING GUIDANCE;  Surgeon: Sajan Hurley MD;  Location: [facility] CATH LAB;  Service: Cardiology;;    REMOVAL OF TUNNELED CENTRAL VENOUS CATHETER (CVC) N/A 3/1/2024    Procedure: REMOVAL, CATHETER, CENTRAL VENOUS, TUNNELED;  Surgeon: Seble Aguilar MD;  Location: [facility] CATH LAB;  Service: Interventional Nephrology;  Laterality: N/A;    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 12/1/2023    Procedure: REPAIR, ANEURYSM, ARTERY, FEMORAL;  Surgeon: Yuri Washington MD;  [facility];  Service: Cardiovascular;  Laterality: Right;  Right Femoral Artery Repair    RIGHT HEART CATHETERIZATION Right 10/10/2023    Procedure: INSERTION, CATHETER, RIGHT HEART;  Surgeon: Bin Gandhi MD;  Location: [facility] CATH LAB;  Service: Cardiology;  Laterality: Right;    RIGHT HEART CATHETERIZATION Right 10/13/2023    Procedure: INSERTION, CATHETER, RIGHT HEART;  Surgeon: Walter Mcintyre MD;  Location: [facility] CATH  LAB;  Service: Cardiology;  Laterality: Right;    RIGHT HEART CATHETERIZATION  11/13/2023    RIGHT HEART CATHETERIZATION Right 11/13/2023    Procedure: INSERTION, CATHETER, RIGHT HEART;  Surgeon: Juventino Bermudez Jr., MD;  Location: [facility] CATH LAB;  Service: Cardiology;  Laterality: Right;    RIGHT HEART CATHETERIZATION Right 11/20/2023    Procedure: INSERTION, CATHETER, RIGHT HEART;  Surgeon: Sajan Hurley MD;  Location: [facility] CATH LAB;  Service: Cardiology;  Laterality: Right;    RIGHT HEART CATHETERIZATION Right 1/22/2024    Procedure: INSERTION, CATHETER, RIGHT HEART;  Surgeon: Brayan Ocampo MD;  Location: [facility] CATH LAB;  Service: Cardiology;  Laterality: Right;    STERNAL WOUND CLOSURE N/A 12/4/2023    Procedure: CLOSURE, WOUND, STERNUM;  Surgeon: Yuri Washington MD;  [facility];  Service: Cardiovascular;  Laterality: N/A;    STERNOTOMY N/A 12/1/2023    Procedure: STERNOTOMY, REDO;  Surgeon: Yuri Washington MD;  Location: [facility] OR [facility]LR;  Service: Cardiovascular;  Laterality: N/A;    VALVULOPLASTY, MITRAL VALVE N/A 12/1/2023    Procedure: VALVULOPLASTY, MITRAL VALVE;  Surgeon: Yuri Washington MD;  [facility];  Service: Cardiovascular;  Laterality: N/A;     No family history on file.  Social History     Tobacco Use    Smoking status: Former     Current packs/day: 0.50     Types: Cigarettes   Substance Use Topics    Alcohol use: Yes     Comment: rarely    Drug use: No     Review of Systems    Physical Exam     Initial Vitals   BP Pulse Resp Temp SpO2   03/24/24 1300 03/24/24 1300 03/24/24 1217 03/24/24 1210 03/24/24 1208   (!) 116/98 81 16 97.6 °F (36.4 °C) 100 %      MAP       --                Physical Exam    Vitals reviewed.  Constitutional:   63-year-old  man, chronically ill-appearing, no acute distress noted   HENT:   Head: Normocephalic and atraumatic.   Redundant soft palate noted without evidence of acute intraoral injury   Eyes: EOM are  normal. Pupils are equal, round, and reactive to light.   Neck: No tracheal deviation present.   Cardiovascular:  Normal rate.           Diminished palpable peripheral pulses, mechanical heart sounds noted on exam   Pulmonary/Chest: Breath sounds normal. No stridor. No respiratory distress. He has no wheezes.   Abdominal:   Drive- train noted to right lower abdomen without surrounding tenderness to palpation or erythema/edema; mild distension/edema noted   Musculoskeletal:         General: Normal range of motion.      Comments: No pitting edema is appreciated     Neurological: He is alert and oriented to person, place, and time.   Skin: Skin is warm and dry.   Psychiatric:   Mildly flat affect noted, cooperative with exam         ED Course   Procedures  Labs Reviewed   CBC W/ AUTO DIFFERENTIAL - Abnormal; Notable for the following components:       Result Value    RBC 2.99 (*)     Hemoglobin 8.5 (*)     Hematocrit 27.3 (*)     MCHC 31.1 (*)     RDW 17.2 (*)     Lymph # 0.7 (*)     Gran % 77.7 (*)     Lymph % 13.3 (*)     All other components within normal limits   COMPREHENSIVE METABOLIC PANEL - Abnormal; Notable for the following components:    Glucose 186 (*)     BUN 58 (*)     Creatinine 3.6 (*)     Albumin 3.4 (*)     ALT 58 (*)     eGFR 18.4 (*)     All other components within normal limits   TROPONIN I - Abnormal; Notable for the following components:    Troponin I 0.043 (*)     All other components within normal limits   B-TYPE NATRIURETIC PEPTIDE - Abnormal; Notable for the following components:    BNP 1,559 (*)     All other components within normal limits   PROTIME-INR - Abnormal; Notable for the following components:    Prothrombin Time 36.3 (*)     INR 3.7 (*)     All other components within normal limits   URINALYSIS, REFLEX TO URINE CULTURE - Abnormal; Notable for the following components:    Color, UA Colorless (*)     Protein, UA Trace (*)     All other components within normal limits    Narrative:      Specimen Source->Urine   LACTIC ACID, PLASMA   MAGNESIUM   PHOSPHORUS     EKG Readings: (Independently Interpreted)   Initial Reading: No STEMI. Heart Rate: 83. Axis: Left Axis Deviation.   Paced     ECG Results              EKG 12-lead (Final result)        Collection Time Result Time QRS Duration OHS QTC Calculation    03/24/24 12:20:15 03/25/24 12:11:01 180 531                     Final result by Interface, Lab In [facility] (03/25/24 12:11:10)                   Narrative:    Test Reason : R06.02,    Vent. Rate : 083 BPM     Atrial Rate : 083 BPM     P-R Int : 118 ms          QRS Dur : 180 ms      QT Int : 452 ms       P-R-T Axes : 000 166 171 degrees     QTc Int : 531 ms     Suspect arm lead reversal, interpretation assumes no reversal  Normal sinus rhythm  Nonspecific intraventricular block  Anterolateral infarct (cited on or before 24-MAR-2024)  Abnormal ECG  When compared with ECG of 13-NOV-2023 22:00,  Fusion complexes are no longer Present  Questionable change in QRS duration  Questionable change in initial forces of Lateral leads  Confirmed by Vaishali Ventura MD ([id number]) on 3/25/2024 12:10:58 PM    Referred By: [email or web address]   SELF           Confirmed By:Vaishali Ventura MD                                  Imaging Results              X-Ray Chest AP Portable (Final result)  Result time 03/24/24 14:25:31      Final result by Cate Wilkes MD (03/24/24 14:25:31)                   Impression:      Minimal bibasilar atelectatic changes.    Possible trace of left pleural fluid      Electronically signed by: Cate Wilkes MD  Date:    03/24/2024  Time:    14:25               Narrative:    EXAMINATION:  XR CHEST AP PORTABLE    CLINICAL HISTORY:  edema;    TECHNIQUE:  Single frontal view of the chest was performed.    COMPARISON:  02/01/2024    FINDINGS:  There is a cardio stimulator device in the left upper chest with a defibrillator lead.  LVAD.    Sternotomy wires.    The cardiac silhouette is mildly  prominent.    Mild perihilar increased lung markings, unchanged.    Minimal atelectasis at the bilateral lung base, left more than right, and possible small left pleural effusion.    The osseous structures appear within normal limits.  No pneumothorax.                                       Medications   acetaminophen tablet 1,000 mg (has no administration in time range)   amiodarone tablet 400 mg (400 mg Oral Given 3/25/24 0843)   famotidine tablet 20 mg (20 mg Oral Given 3/25/24 0842)   fluticasone propionate 50 mcg/actuation nasal spray 100 mcg (100 mcg Each Nostril Not Given 3/25/24 0900)   gabapentin capsule 100 mg (100 mg Oral Given 3/25/24 0842)   magnesium oxide tablet 400 mg (400 mg Oral Given 3/25/24 0842)   polyethylene glycol packet 17 g (has no administration in time range)   senna-docusate 8.6-50 mg per tablet 2 tablet (has no administration in time range)   sodium bicarbonate tablet 325 mg (325 mg Oral Given 3/25/24 0843)   trazodone split tablet 25 mg (25 mg Oral Given 3/24/24 2001)   venlafaxine tablet 37.5 mg (37.5 mg Oral Given 3/25/24 0843)   vitamin D 1000 units tablet 1,000 Units (1,000 Units Oral Given 3/25/24 0843)   furosemide (Lasix) 200 mg in sodium chloride 0.9% SolP 100 mL continuous infusion (conc: 2 mg/mL) (20 mg/hr Intravenous Rate/Dose Change 3/25/24 1005)   potassium chloride SA CR tablet 40 mEq (40 mEq Oral Given 3/25/24 0842)   hydrALAZINE tablet 75 mg (has no administration in time range)   furosemide injection 100 mg (100 mg Intravenous Given 3/24/24 1340)   potassium chloride SA CR tablet 40 mEq (40 mEq Oral Given 3/25/24 0220)   magnesium oxide tablet 400 mg (400 mg Oral Given 3/25/24 0220)   magnesium sulfate 2g in water 50mL IVPB (premix) (0 g Intravenous Stopped 3/25/24 1100)   hydrALAZINE tablet 25 mg (25 mg Oral Given 3/25/24 0855)   furosemide injection 80 mg (80 mg Intravenous Given by Other 3/25/24 1004)     Medical Decision Making  Differential diagnosis:  Pulmonary  edema, anasarca, IVÁN, CHF exacerbation, LVAD complication    Amount and/or Complexity of Data Reviewed  Labs: ordered. Decision-making details documented in ED Course.  Radiology: ordered. Decision-making details documented in ED Course.  ECG/medicine tests: ordered and independent interpretation performed. Decision-making details documented in ED Course.    Risk  Prescription drug management.  Decision regarding hospitalization.              Attending Attestation:             Attending ED Notes:   Lasix 100 mg IV administered with minimal initial diuretic response. Laboratory evaluation reveals chronic anemia without significant concern for acute blood loss. INR elevated at 3.7. BNP grossly elevated > 1500 with baseline 700's with evidence of lower extremity and abdominal wall edema. Chest imaging without grave infiltrate or kamar consolidation. Renal function is poor and consistent with previous findings. Findings discussed with heart transplant service who will adnmit for further therapy and management.                             Clinical Impression:  Final diagnoses:  [R06.02] SOB (shortness of breath) on exertion  [R06.02] Shortness of breath  [Z95.811] Left ventricular assist device present (Primary)  [N18.4] CKD (chronic kidney disease) stage 4, GFR 15-29 ml/min  [Z79.01] Chronic anticoagulation  [D53.9] Macrocytic anemia  [I50.43] Acute on chronic combined systolic and diastolic CHF, NYHA class 4  [R73.9] Hyperglycemia without ketosis          ED Disposition Condition    Admit                 Albert Raygoza MD  03/25/24 0722

## 2024-03-24 NOTE — ASSESSMENT & PLAN NOTE
Procedure: Device Interrogation Including analysis of device parameters  Current Settings: Ventricular Assist Device  INR supratherapeutic  Review of device function is stable/unstable stable        3/24/2024     1:53 PM 3/21/2024     7:29 AM 3/1/2024     6:10 AM 2/29/2024     3:01 PM 2/15/2024     1:33 PM 2/8/2024     1:39 PM 2/1/2024    12:11 PM   TXP LVAD INTERROGATIONS   Type HeartMate3  HeartMate3       Flow 3.6  3.7       Speed 5000  5000       PI 7.2  6.6       Power ([place]) 3.4  3.5       Pulsatility Pulse No Pulse No Pulse No Pulse No Pulse No Pulse No Pulse

## 2024-03-24 NOTE — ED TRIAGE NOTES
Radha Abbott, a 63 y.o. male presents to the ED w/ complaint of leg swelling and weight gain. Pt states that he has noticed a recent weight gain and some swelling in his legs over the last two days. Pt recently had LVAD placement 12/2023.    Triage note:  Chief Complaint   Patient presents with    Leg Swelling     Bilateral lower extremity swelling and stomach bloating since yesterday. Patient has an LVAD, denies any alarms going off on pump     Review of patient's allergies indicates:  No Known Allergies  Past Medical History:   Diagnosis Date    CAD (coronary artery disease)     CHF (congestive heart failure)     Diabetes mellitus     HFrEF (heart failure with reduced ejection fraction)     ICD (implantable cardioverter-defibrillator) in place     MI, old

## 2024-03-24 NOTE — SUBJECTIVE & OBJECTIVE
Past Medical History:   Diagnosis Date    CAD (coronary artery disease)     CHF (congestive heart failure)     Diabetes mellitus     HFrEF (heart failure with reduced ejection fraction)     ICD (implantable cardioverter-defibrillator) in place     MI, old        Past Surgical History:   Procedure Laterality Date    ANGIOPLASTY-VENOUS ARTERY Right 12/1/2023    Procedure: ANGIOPLASTY-VENOUS ARTERY, RIGHT FEMORAL;  Surgeon: Yuri Washington MD;  [facility];  Service: Cardiovascular;  Laterality: Right;    AORTIC VALVULOPLASTY N/A 12/1/2023    Procedure: REPAIR, AORTIC VALVE;  Surgeon: Yuri Washington MD;  [facility];  Service: Cardiovascular;  Laterality: N/A;    CARDIAC SURGERY      CLOSURE N/A 12/1/2023    Procedure: CLOSURE, TEMPORARY;  Surgeon: Yuri Washington MD;  [facility];  Service: Cardiovascular;  Laterality: N/A;    DRAINAGE OF PLEURAL EFFUSION  12/4/2023    Procedure: DRAINAGE, PLEURAL EFFUSION;  Surgeon: Yuri Washington MD;  [facility];  Service: Cardiovascular;;    INSERTION OF GRAFT TO PERICARDIUM  12/4/2023    Procedure: INSERTION, GRAFT, PERICARDIUM;  Surgeon: Yuri Washington MD;  Location: [facility] OR [address] [place] [address];  Service: Cardiovascular;;    INSERTION OF INTRA-AORTIC BALLOON ASSIST DEVICE Right 11/21/2023    Procedure: INSERTION, INTRA-AORTIC BALLOON PUMP;  Surgeon: Finn Cohn MD;  Location: [facility] CATH LAB;  Service: Cardiology;  Laterality: Right;    LEFT VENTRICULAR ASSIST DEVICE Left 12/1/2023    Procedure: INSERTION-LEFT VENTRICULAR ASSIST DEVICE;  Surgeon: Yuri Washington MD;  Location: [facility] OR [address] [place] [address];  Service: Cardiovascular;  Laterality: Left;  REDO STERNOTOMY - REDO SAW NEEDED FOR CASE    LYSIS OF ADHESIONS  12/1/2023    Procedure: LYSIS, ADHESIONS;  Surgeon: Yuri Washington MD;  Location: [facility] OR [address] [place] [address];  Service: Cardiovascular;;    PLACEMENT OF SWAN ROLANDO CATHETER WITH IMAGING GUIDANCE  11/20/2023    Procedure: INSERTION,  CATHETER, SWAN-ROLANDO, WITH IMAGING GUIDANCE;  Surgeon: Sajan Hurley MD;  Location: [facility] CATH LAB;  Service: Cardiology;;    REMOVAL OF TUNNELED CENTRAL VENOUS CATHETER (CVC) N/A 3/1/2024    Procedure: REMOVAL, CATHETER, CENTRAL VENOUS, TUNNELED;  Surgeon: Seble Aguilar MD;  Location: [facility] CATH LAB;  Service: Interventional Nephrology;  Laterality: N/A;    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 12/1/2023    Procedure: REPAIR, ANEURYSM, ARTERY, FEMORAL;  Surgeon: Yuri Washington MD;  [facility];  Service: Cardiovascular;  Laterality: Right;  Right Femoral Artery Repair    RIGHT HEART CATHETERIZATION Right 10/10/2023    Procedure: INSERTION, CATHETER, RIGHT HEART;  Surgeon: Bin Gandhi MD;  [facility]: [facility] CATH LAB;  Service: Cardiology;  Laterality: Right;    RIGHT HEART CATHETERIZATION Right 10/13/2023    Procedure: INSERTION, CATHETER, RIGHT HEART;  Surgeon: Walter Mcintyre MD;  Location: [facility] CATH LAB;  Service: Cardiology;  Laterality: Right;    RIGHT HEART CATHETERIZATION  11/13/2023    RIGHT HEART CATHETERIZATION Right 11/13/2023    Procedure: INSERTION, CATHETER, RIGHT HEART;  Surgeon: Juventino Bermudez Jr., MD;  Location: [facility] CATH LAB;  Service: Cardiology;  Laterality: Right;    RIGHT HEART CATHETERIZATION Right 11/20/2023    Procedure: INSERTION, CATHETER, RIGHT HEART;  Surgeon: Sajan uHrley MD;  Location: [facility] CATH LAB;  Service: Cardiology;  Laterality: Right;    RIGHT HEART CATHETERIZATION Right 1/22/2024    Procedure: INSERTION, CATHETER, RIGHT HEART;  Surgeon: Brayan Ocampo MD;  Location: [facility] CATH LAB;  Service: Cardiology;  Laterality: Right;    STERNAL WOUND CLOSURE N/A 12/4/2023    Procedure: CLOSURE, WOUND, STERNUM;  Surgeon: Yuri Washington MD;  [facility];  Service: Cardiovascular;  Laterality: N/A;    STERNOTOMY N/A 12/1/2023    Procedure: STERNOTOMY, REDO;  Surgeon: Yuri Washington MD;  [facility];  Service:  Cardiovascular;  Laterality: N/A;    VALVULOPLASTY, MITRAL VALVE N/A 12/1/2023    Procedure: VALVULOPLASTY, MITRAL VALVE;  Surgeon: Yuri Washington MD;  Location: [facility] OR [address] [place] [address];  Service: Cardiovascular;  Laterality: N/A;       Review of patient's allergies indicates:  No Known Allergies    Current Facility-Administered Medications   Medication    acetaminophen tablet 1,000 mg    [START ON 3/25/2024] amiodarone tablet 400 mg    [START ON 3/25/2024] famotidine tablet 20 mg    [START ON 3/25/2024] fluticasone propionate 50 mcg/actuation nasal spray 100 mcg    gabapentin capsule 100 mg    hydrALAZINE tablet 50 mg    [START ON 3/25/2024] magnesium oxide tablet 400 mg    polyethylene glycol packet 17 g    senna-docusate 8.6-50 mg per tablet 2 tablet    sodium bicarbonate tablet 325 mg    trazodone split tablet 25 mg    [START ON 3/25/2024] venlafaxine tablet 37.5 mg    [START ON 3/25/2024] vitamin D 1000 units tablet 1,000 Units     Current Outpatient Medications   Medication Sig    acetaminophen (TYLENOL) 500 MG tablet Take 2 tablets (1,000 mg total) by mouth every 8 (eight) hours as needed for Pain.    amiodarone (PACERONE) 400 MG tablet Take 1 tablet (400 mg total) by mouth once daily.    famotidine (PEPCID) 20 MG tablet Take 1 tablet (20 mg total) by mouth once daily.    fluticasone propionate (FLONASE) 50 mcg/actuation nasal spray 2 sprays (100 mcg total) by Each Nostril route once daily.    gabapentin (NEURONTIN) 100 MG capsule Take 1 capsule (100 mg total) by mouth 2 (two) times daily.    hydrALAZINE (APRESOLINE) 50 MG tablet Take 1 tablet (50 mg total) by mouth every 8 (eight) hours.    magnesium oxide (MAG-OX) 400 mg (241.3 mg magnesium) tablet Take 1 tablet (400 mg total) by mouth once daily.    metOLazone (ZAROXOLYN) 2.5 MG tablet Take 1 tablet (2.5 mg total) by mouth twice a week.    omega-3 acid ethyl esters (LOVAZA) 1 gram capsule Take 2 capsules (2 g total) by mouth 2 (two) times daily.    polyethylene  glycol (GLYCOLAX) 17 gram PwPk Take 17 g by mouth as needed for Constipation.    potassium chloride SA (K-DUR,KLOR-CON) 20 MEQ tablet Take 40 meq once daily the days you take metolazone only.    senna-docusate 8.6-50 mg (PERICOLACE) 8.6-50 mg per tablet Take 2 tablets by mouth daily as needed for Constipation.    sodium bicarbonate 325 MG tablet Take 1 tablet (325 mg total) by mouth 3 (three) times daily.    torsemide 40 mg Tab Take 80 mg by mouth 2 (two) times a day.    traMADoL (ULTRAM) 50 mg tablet Take 50 mg by mouth every 12 (twelve) hours as needed for Pain.    traZODone (DESYREL) 50 MG tablet Take 0.5 tablets (25 mg total) by mouth every evening.    venlafaxine (EFFEXOR) 37.5 MG Tab Take 1 tablet (37.5 mg total) by mouth once daily.    vitamin D (VITAMIN D3) 1000 units Tab Take 1 tablet (1,000 Units total) by mouth once daily.    warfarin (COUMADIN) 2.5 MG tablet Take 3.75mg by mouth daily except 2.5mg on MWF or As directed by coumadin clinic     Family History    None       Tobacco Use    Smoking status: Former     Current packs/day: 0.50     Types: Cigarettes    Smokeless tobacco: Not on file   Substance and Sexual Activity    Alcohol use: Yes     Comment: rarely    Drug use: No    Sexual activity: Not Currently     Partners: Female     Review of Systems   Constitutional:  Positive for activity change, appetite change and unexpected weight change.   Respiratory:  Positive for shortness of breath.    Cardiovascular:  Positive for leg swelling. Negative for chest pain.   Gastrointestinal:  Positive for abdominal distention. Negative for abdominal pain, diarrhea and nausea.     Objective:     Vital Signs (Most Recent):  Temp: 97.6 °F (36.4 °C) (03/24/24 1210)  Pulse: 79 (03/24/24 1530)  Resp: 16 (03/24/24 1530)  BP: (!) 84/0 (03/24/24 1315)  SpO2: 100 % (03/24/24 1530) Vital Signs (24h Range):  Temp:  [97.6 °F (36.4 °C)] 97.6 °F (36.4 °C)  Pulse:  [79-81] 79  Resp:  [15-23] 16  SpO2:  [99 %-100 %] 100 %  BP:  ()/(0-98) 84/0     Patient Vitals for the past 72 hrs (Last 3 readings):   Weight   03/24/24 1208 81.6 kg (180 lb)     Body mass index is 23.11 kg/m².      Intake/Output Summary (Last 24 hours) at 3/24/2024 1706  Last data filed at 3/24/2024 1446  Gross per 24 hour   Intake --   Output 300 ml   Net -300 ml          Physical Exam  Constitutional:       Appearance: Normal appearance.   HENT:      Head: Normocephalic and atraumatic.   Eyes:      Pupils: Pupils are equal, round, and reactive to light.   Neck:      Vascular: JVD present.      Comments: JVP elevated up near the jaw at 45 deg  Cardiovascular:      Rate and Rhythm: Normal rate and regular rhythm.      Pulses: Normal pulses.      Heart sounds: Normal heart sounds.      Comments: LVAD hum is smooth  Pulmonary:      Effort: Pulmonary effort is normal.      Breath sounds: Normal breath sounds.   Abdominal:      General: Bowel sounds are normal. There is no distension.      Palpations: Abdomen is soft.      Tenderness: There is no abdominal tenderness.      Comments: DLES c/d/i   Musculoskeletal:         General: Normal range of motion.      Cervical back: Normal range of motion and neck supple.      Right lower leg: Edema present.      Left lower leg: Edema present.   Skin:     General: Skin is warm and dry.      Capillary Refill: Capillary refill takes less than 2 seconds.   Neurological:      General: No focal deficit present.      Mental Status: He is alert and oriented to person, place, and time.   Psychiatric:         Mood and Affect: Mood normal.         Behavior: Behavior normal.            Significant Labs:  CBC:  Recent Labs   Lab 03/21/24  0857 03/24/24  1320   WBC 5.47 5.18   RBC 2.95* 2.99*   HGB 8.5* 8.5*   HCT 27.3* 27.3*    318   MCV 93 91   MCH 28.8 28.4   MCHC 31.1* 31.1*     BNP:  Recent Labs   Lab 03/21/24  0857 03/24/24  1320   * 1,559*     CMP:  Recent Labs   Lab 03/21/24  0857 03/24/24  1320   * 186*   CALCIUM  "9.3 9.2   ALBUMIN 3.4* 3.4*   PROT 7.5 8.2    137   K 4.3 4.2   CO2 24 26    103   BUN 55* 58*   CREATININE 3.5* 3.6*   ALKPHOS 112 119   ALT 53* 58*   AST 34 39   BILITOT 0.3 0.3      Coagulation:   Recent Labs   Lab 03/21/24  0857 03/24/24  1320   INR 3.4* 3.7*     LDH:  No results for input(s): "LDH" in the last 72 hours.  Microbiology:  Microbiology Results (last 7 days)       ** No results found for the last 168 hours. **            I have reviewed all pertinent labs within the past 24 hours.    Diagnostic Results:  I have reviewed all pertinent imaging results/findings within the past 24 hours.    "

## 2024-03-24 NOTE — ASSESSMENT & PLAN NOTE
Patient interested in psychiatry consult and would like to start an antidepressant  Will consult psychiatry

## 2024-03-24 NOTE — ASSESSMENT & PLAN NOTE
Patient hypervolemic on exam today  Home diuretic torsemide 80 BID  GDMT limited to hydralazine due to CKD  Plan to start lasix gtt at 15 mg/hr and adjust dose to UOP response.   Will get formal echo tomorrow  1500cc/day fluid restriction  Replace K and mag

## 2024-03-24 NOTE — HPI
Mr Abbott is a 62 y/o  with stage D HFrEF due to ICMP w/ CAD and Hx of CABG in 2009. He did undergo HM3 placement placed by Dr Washington on 12/1/23. Lengthy post op course complicated by vasoplegia(requiring Giaprezza), debility, malnutrition/impaired swallowing, and renal failure requiring HD(since weaned off). Once medically stable, he was transferred to Rehab for further PT/OT/ST. Patient has since been discharged from rehab and permacath removed.     He presents today via the ED due to increased fluid retention, weight gain, peripheral edema, poor appetite. He was seen in HTS clinic last week and his torsemide dose was increased to 80 mg BID in an attempt to get the extra fluid weight off but he continued to retain fluid since then. Now he endorses orthopnea, poor appetite, also is concern for depressed mood since his LVAD surgery. Denies N/V/D. Denies Chest pain, lightheadedness or syncope. BNP up from 700 to 1600. Weight 180 lbs today when goal in clinic recently was set to 170 lbs. Vital signs stable in the ED.

## 2024-03-24 NOTE — CONSULTS
[facility] - Emergency Dept  Cardiology  Consult Note        Inpatient consult to Cardiology  Consult performed by: Justin Bruce MD  Consult ordered by: Albert Raygoza MD          63 year old DT HM3 who presents with weight gain, LE/abd swelling. Recently seen in HTS clinic and torsemide increased to 80mg BID which he reports taking but continuing to gain weight.  (3/21)>>1500 today. CXR with mild edema. JVD 3/4 up neck at 30 degrees, orthopnea, and LE swelling on exam. LA WNL, LE warm. Will admit for diuresis. Case discussed with Dr. Casas.     Full H&P to follow

## 2024-03-24 NOTE — H&P
[facility] - Emergency Dept  Heart Transplant  H&P    Patient Name: Radha Abbott  MRN: [id number]  Admission Date: 3/24/2024  Attending Physician: Sajan Hurley, *  Primary Care Provider: Vasu Kong MD  Principal Problem:<principal problem not specified>    Subjective:     History of Present Illness:  Mr Abbott is a 62 y/o  with stage D HFrEF due to ICMP w/ CAD and Hx of CABG in 2009. He did undergo HM3 placement placed by Dr Washington on 12/1/23. Lengthy post op course complicated by vasoplegia(requiring Giaprezza), debility, malnutrition/impaired swallowing, and renal failure requiring HD(since weaned off). Once medically stable, he was transferred to Rehab for further PT/OT/ST. Patient has since been discharged from rehab and permacath removed.     He presents today via the ED due to increased fluid retention, weight gain, peripheral edema, poor appetite. He was seen in HTS clinic last week and his torsemide dose was increased to 80 mg BID in an attempt to get the extra fluid weight off but he continued to retain fluid since then. Now he endorses orthopnea, poor appetite, also is concern for depressed mood since his LVAD surgery. Denies N/V/D. Denies Chest pain, lightheadedness or syncope. BNP up from 700 to 1600. Weight 180 lbs today when goal in clinic recently was set to 170 lbs. Vital signs stable in the ED.     Past Medical History:   Diagnosis Date    CAD (coronary artery disease)     CHF (congestive heart failure)     Diabetes mellitus     HFrEF (heart failure with reduced ejection fraction)     ICD (implantable cardioverter-defibrillator) in place     MI, old        Past Surgical History:   Procedure Laterality Date    ANGIOPLASTY-VENOUS ARTERY Right 12/1/2023    Procedure: ANGIOPLASTY-VENOUS ARTERY, RIGHT FEMORAL;  Surgeon: Yuri Washington MD;  Location: [facility] OR [address] [place] [address];  Service: Cardiovascular;  Laterality: Right;    AORTIC VALVULOPLASTY N/A 12/1/2023    Procedure: REPAIR, AORTIC VALVE;   Surgeon: Yuri Washington MD;  Location: [facility] OR [facility] FLR;  Service: Cardiovascular;  Laterality: N/A;    CARDIAC SURGERY      CLOSURE N/A 12/1/2023    Procedure: CLOSURE, TEMPORARY;  Surgeon: Yuri Washington MD;  Location: [facility] OR 2ND FLR;  Service: Cardiovascular;  Laterality: N/A;    DRAINAGE OF PLEURAL EFFUSION  12/4/2023    Procedure: DRAINAGE, PLEURAL EFFUSION;  Surgeon: Yuri Washington MD;  Location: [facility] OR [facility] FLR;  Service: Cardiovascular;;    INSERTION OF GRAFT TO PERICARDIUM  12/4/2023    Procedure: INSERTION, GRAFT, PERICARDIUM;  Surgeon: Yuri Washington MD;  Location: [facility] OR [facility] FLR;  Service: Cardiovascular;;    INSERTION OF INTRA-AORTIC BALLOON ASSIST DEVICE Right 11/21/2023    Procedure: INSERTION, INTRA-AORTIC BALLOON PUMP;  Surgeon: Finn Cohn MD;  Location: [facility] CATH LAB;  Service: Cardiology;  Laterality: Right;    LEFT VENTRICULAR ASSIST DEVICE Left 12/1/2023    Procedure: INSERTION-LEFT VENTRICULAR ASSIST DEVICE;  Surgeon: Yuri Washington MD;  [facility];  Service: Cardiovascular;  Laterality: Left;  REDO STERNOTOMY - REDO SAW NEEDED FOR CASE    LYSIS OF ADHESIONS  12/1/2023    Procedure: LYSIS, ADHESIONS;  Surgeon: Yuri Washington MD;  [facility];  Service: Cardiovascular;;    PLACEMENT OF SWAN ROLANDO CATHETER WITH IMAGING GUIDANCE  11/20/2023    Procedure: INSERTION, CATHETER, SWAN-ROLANDO, WITH IMAGING GUIDANCE;  Surgeon: Sajan Hurley MD;  Location: [facility] CATH LAB;  Service: Cardiology;;    REMOVAL OF TUNNELED CENTRAL VENOUS CATHETER (CVC) N/A 3/1/2024    Procedure: REMOVAL, CATHETER, CENTRAL VENOUS, TUNNELED;  Surgeon: Seble Aguilar MD;  Location: [facility] CATH LAB;  Service: Interventional Nephrology;  Laterality: N/A;    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 12/1/2023    Procedure: REPAIR, ANEURYSM, ARTERY, FEMORAL;  Surgeon: Yuri Washington MD;  Location: [facility] OR [facility] FLR;  Service: Cardiovascular;  Laterality: Right;  Right Femoral Artery  Repair    RIGHT HEART CATHETERIZATION Right 10/10/2023    Procedure: INSERTION, CATHETER, RIGHT HEART;  Surgeon: Bin Gandhi MD;  Location: [facility] CATH LAB;  Service: Cardiology;  Laterality: Right;    RIGHT HEART CATHETERIZATION Right 10/13/2023    Procedure: INSERTION, CATHETER, RIGHT HEART;  Surgeon: Walter Mcintyre MD;  Location: [facility] CATH LAB;  Service: Cardiology;  Laterality: Right;    RIGHT HEART CATHETERIZATION  11/13/2023    RIGHT HEART CATHETERIZATION Right 11/13/2023    Procedure: INSERTION, CATHETER, RIGHT HEART;  Surgeon: Juventino Bermudez Jr., MD;  Location: [facility] CATH LAB;  Service: Cardiology;  Laterality: Right;    RIGHT HEART CATHETERIZATION Right 11/20/2023    Procedure: INSERTION, CATHETER, RIGHT HEART;  Surgeon: Sajan Hurley MD;  Location: [facility] CATH LAB;  Service: Cardiology;  Laterality: Right;    RIGHT HEART CATHETERIZATION Right 1/22/2024    Procedure: INSERTION, CATHETER, RIGHT HEART;  Surgeon: Brayan Ocampo MD;  Location: [facility] CATH LAB;  Service: Cardiology;  Laterality: Right;    STERNAL WOUND CLOSURE N/A 12/4/2023    Procedure: CLOSURE, WOUND, STERNUM;  Surgeon: Yuri Washington MD;  [facility];  Service: Cardiovascular;  Laterality: N/A;    STERNOTOMY N/A 12/1/2023    Procedure: STERNOTOMY, REDO;  Surgeon: Yuri Washington MD;  [facility];  Service: Cardiovascular;  Laterality: N/A;    VALVULOPLASTY, MITRAL VALVE N/A 12/1/2023    Procedure: VALVULOPLASTY, MITRAL VALVE;  Surgeon: Yuri Washington MD;  [facility];  Service: Cardiovascular;  Laterality: N/A;       Review of patient's allergies indicates:  No Known Allergies    Current Facility-Administered Medications   Medication    acetaminophen tablet 1,000 mg    [START ON 3/25/2024] amiodarone tablet 400 mg    [START ON 3/25/2024] famotidine tablet 20 mg    [START ON 3/25/2024] fluticasone propionate 50 mcg/actuation nasal spray 100 mcg    gabapentin capsule 100 mg     hydrALAZINE tablet 50 mg    [START ON 3/25/2024] magnesium oxide tablet 400 mg    polyethylene glycol packet 17 g    senna-docusate 8.6-50 mg per tablet 2 tablet    sodium bicarbonate tablet 325 mg    trazodone split tablet 25 mg    [START ON 3/25/2024] venlafaxine tablet 37.5 mg    [START ON 3/25/2024] vitamin D 1000 units tablet 1,000 Units     Current Outpatient Medications   Medication Sig    acetaminophen (TYLENOL) 500 MG tablet Take 2 tablets (1,000 mg total) by mouth every 8 (eight) hours as needed for Pain.    amiodarone (PACERONE) 400 MG tablet Take 1 tablet (400 mg total) by mouth once daily.    famotidine (PEPCID) 20 MG tablet Take 1 tablet (20 mg total) by mouth once daily.    fluticasone propionate (FLONASE) 50 mcg/actuation nasal spray 2 sprays (100 mcg total) by Each Nostril route once daily.    gabapentin (NEURONTIN) 100 MG capsule Take 1 capsule (100 mg total) by mouth 2 (two) times daily.    hydrALAZINE (APRESOLINE) 50 MG tablet Take 1 tablet (50 mg total) by mouth every 8 (eight) hours.    magnesium oxide (MAG-OX) 400 mg (241.3 mg magnesium) tablet Take 1 tablet (400 mg total) by mouth once daily.    metOLazone (ZAROXOLYN) 2.5 MG tablet Take 1 tablet (2.5 mg total) by mouth twice a week.    omega-3 acid ethyl esters (LOVAZA) 1 gram capsule Take 2 capsules (2 g total) by mouth 2 (two) times daily.    polyethylene glycol (GLYCOLAX) 17 gram PwPk Take 17 g by mouth as needed for Constipation.    potassium chloride SA (K-DUR,KLOR-CON) 20 MEQ tablet Take 40 meq once daily the days you take metolazone only.    senna-docusate 8.6-50 mg (PERICOLACE) 8.6-50 mg per tablet Take 2 tablets by mouth daily as needed for Constipation.    sodium bicarbonate 325 MG tablet Take 1 tablet (325 mg total) by mouth 3 (three) times daily.    torsemide 40 mg Tab Take 80 mg by mouth 2 (two) times a day.    traMADoL (ULTRAM) 50 mg tablet Take 50 mg by mouth every 12 (twelve) hours as needed for Pain.    traZODone (DESYREL) 50  MG tablet Take 0.5 tablets (25 mg total) by mouth every evening.    venlafaxine (EFFEXOR) 37.5 MG Tab Take 1 tablet (37.5 mg total) by mouth once daily.    vitamin D (VITAMIN D3) 1000 units Tab Take 1 tablet (1,000 Units total) by mouth once daily.    warfarin (COUMADIN) 2.5 MG tablet Take 3.75mg by mouth daily except 2.5mg on MWF or As directed by coumadin clinic     Family History    None       Tobacco Use    Smoking status: Former     Current packs/day: 0.50     Types: Cigarettes    Smokeless tobacco: Not on file   Substance and Sexual Activity    Alcohol use: Yes     Comment: rarely    Drug use: No    Sexual activity: Not Currently     Partners: Female     Review of Systems   Constitutional:  Positive for activity change, appetite change and unexpected weight change.   Respiratory:  Positive for shortness of breath.    Cardiovascular:  Positive for leg swelling. Negative for chest pain.   Gastrointestinal:  Positive for abdominal distention. Negative for abdominal pain, diarrhea and nausea.     Objective:     Vital Signs (Most Recent):  Temp: 97.6 °F (36.4 °C) (03/24/24 1210)  Pulse: 79 (03/24/24 1530)  Resp: 16 (03/24/24 1530)  BP: (!) 84/0 (03/24/24 1315)  SpO2: 100 % (03/24/24 1530) Vital Signs (24h Range):  Temp:  [97.6 °F (36.4 °C)] 97.6 °F (36.4 °C)  Pulse:  [79-81] 79  Resp:  [15-23] 16  SpO2:  [99 %-100 %] 100 %  BP: ()/(0-98) 84/0     Patient Vitals for the past 72 hrs (Last 3 readings):   Weight   03/24/24 1208 81.6 kg (180 lb)     Body mass index is 23.11 kg/m².      Intake/Output Summary (Last 24 hours) at 3/24/2024 1706  Last data filed at 3/24/2024 1446  Gross per 24 hour   Intake --   Output 300 ml   Net -300 ml          Physical Exam  Constitutional:       Appearance: Normal appearance.   HENT:      Head: Normocephalic and atraumatic.   Eyes:      Pupils: Pupils are equal, round, and reactive to light.   Neck:      Vascular: JVD present.      Comments: JVP elevated up near the jaw at 45  "deg  Cardiovascular:      Rate and Rhythm: Normal rate and regular rhythm.      Pulses: Normal pulses.      Heart sounds: Normal heart sounds.      Comments: LVAD hum is smooth  Pulmonary:      Effort: Pulmonary effort is normal.      Breath sounds: Normal breath sounds.   Abdominal:      General: Bowel sounds are normal. There is no distension.      Palpations: Abdomen is soft.      Tenderness: There is no abdominal tenderness.      Comments: DLES c/d/i   Musculoskeletal:         General: Normal range of motion.      Cervical back: Normal range of motion and neck supple.      Right lower leg: Edema present.      Left lower leg: Edema present.   Skin:     General: Skin is warm and dry.      Capillary Refill: Capillary refill takes less than 2 seconds.   Neurological:      General: No focal deficit present.      Mental Status: He is alert and oriented to person, place, and time.   Psychiatric:         Mood and Affect: Mood normal.         Behavior: Behavior normal.            Significant Labs:  CBC:  Recent Labs   Lab 03/21/24  0857 03/24/24  1320   WBC 5.47 5.18   RBC 2.95* 2.99*   HGB 8.5* 8.5*   HCT 27.3* 27.3*    318   MCV 93 91   MCH 28.8 28.4   MCHC 31.1* 31.1*     BNP:  Recent Labs   Lab 03/21/24  0857 03/24/24  1320   * 1,559*     CMP:  Recent Labs   Lab 03/21/24  0857 03/24/24  1320   * 186*   CALCIUM 9.3 9.2   ALBUMIN 3.4* 3.4*   PROT 7.5 8.2    137   K 4.3 4.2   CO2 24 26    103   BUN 55* 58*   CREATININE 3.5* 3.6*   ALKPHOS 112 119   ALT 53* 58*   AST 34 39   BILITOT 0.3 0.3      Coagulation:   Recent Labs   Lab 03/21/24  0857 03/24/24  1320   INR 3.4* 3.7*     LDH:  No results for input(s): "LDH" in the last 72 hours.  Microbiology:  Microbiology Results (last 7 days)       ** No results found for the last 168 hours. **            I have reviewed all pertinent labs within the past 24 hours.    Diagnostic Results:  I have reviewed all pertinent imaging results/findings " within the past 24 hours.    Assessment/Plan:     Depressed mood  Patient interested in psychiatry consult and would like to start an antidepressant  Will consult psychiatry     LVAD (left ventricular assist device) present  Procedure: Device Interrogation Including analysis of device parameters  Current Settings: Ventricular Assist Device  INR supratherapeutic  Review of device function is stable/unstable stable        3/24/2024     1:53 PM 3/21/2024     7:29 AM 3/1/2024     6:10 AM 2/29/2024     3:01 PM 2/15/2024     1:33 PM 2/8/2024     1:39 PM 2/1/2024    12:11 PM   TXP LVAD INTERROGATIONS   Type HeartMate3  HeartMate3       Flow 3.6  3.7       Speed 5000  5000       PI 7.2  6.6       Power ([place]) 3.4  3.5       Pulsatility Pulse No Pulse No Pulse No Pulse No Pulse No Pulse No Pulse         Acute on chronic combined systolic and diastolic heart failure  Patient hypervolemic on exam today  Home diuretic torsemide 80 BID  GDMT limited to hydralazine due to CKD  Plan to start lasix gtt at 15 mg/hr and adjust dose to UOP response.   Will get formal echo tomorrow  1500cc/day fluid restriction  Replace K and mag    Ventricular tachycardia  Continue amio 400 qd        Jimy An PA-C  Heart Transplant  [facility] - Emergency Dept

## 2024-03-25 ENCOUNTER — TELEPHONE (OUTPATIENT)
Dept: TRANSPLANT | Facility: CLINIC | Age: 64
End: 2024-03-25
Payer: MEDICAID

## 2024-03-25 DIAGNOSIS — D63.1 ANEMIA IN STAGE 4 CHRONIC KIDNEY DISEASE: Primary | ICD-10-CM

## 2024-03-25 DIAGNOSIS — N18.4 ANEMIA IN STAGE 4 CHRONIC KIDNEY DISEASE: Primary | ICD-10-CM

## 2024-03-25 LAB
ALBUMIN SERPL BCP-MCNC: 3 G/DL (ref 3.5–5.2)
ALP SERPL-CCNC: 107 U/L (ref 55–135)
ALT SERPL W/O P-5'-P-CCNC: 55 U/L (ref 10–44)
ANION GAP SERPL CALC-SCNC: 10 MMOL/L (ref 8–16)
ANION GAP SERPL CALC-SCNC: 10 MMOL/L (ref 8–16)
ANION GAP SERPL CALC-SCNC: 8 MMOL/L (ref 8–16)
APTT PPP: 45.1 SEC (ref 21–32)
AST SERPL-CCNC: 39 U/L (ref 10–40)
BASOPHILS # BLD AUTO: 0.06 K/UL (ref 0–0.2)
BASOPHILS NFR BLD: 1.2 % (ref 0–1.9)
BILIRUB DIRECT SERPL-MCNC: 0.1 MG/DL (ref 0.1–0.3)
BILIRUB SERPL-MCNC: 0.3 MG/DL (ref 0.1–1)
BNP SERPL-MCNC: 1101 PG/ML (ref 0–99)
BUN SERPL-MCNC: 53 MG/DL (ref 8–23)
BUN SERPL-MCNC: 54 MG/DL (ref 8–23)
BUN SERPL-MCNC: 56 MG/DL (ref 8–23)
CALCIUM SERPL-MCNC: 9 MG/DL (ref 8.7–10.5)
CALCIUM SERPL-MCNC: 9.1 MG/DL (ref 8.7–10.5)
CALCIUM SERPL-MCNC: 9.4 MG/DL (ref 8.7–10.5)
CHLORIDE SERPL-SCNC: 103 MMOL/L (ref 95–110)
CO2 SERPL-SCNC: 24 MMOL/L (ref 23–29)
CO2 SERPL-SCNC: 25 MMOL/L (ref 23–29)
CO2 SERPL-SCNC: 25 MMOL/L (ref 23–29)
CREAT SERPL-MCNC: 3.3 MG/DL (ref 0.5–1.4)
CREAT SERPL-MCNC: 3.3 MG/DL (ref 0.5–1.4)
CREAT SERPL-MCNC: 3.5 MG/DL (ref 0.5–1.4)
DIFFERENTIAL METHOD BLD: ABNORMAL
EOSINOPHIL # BLD AUTO: 0.1 K/UL (ref 0–0.5)
EOSINOPHIL NFR BLD: 2.4 % (ref 0–8)
ERYTHROCYTE [DISTWIDTH] IN BLOOD BY AUTOMATED COUNT: 17.2 % (ref 11.5–14.5)
EST. GFR  (NO RACE VARIABLE): 19 ML/MIN/1.73 M^2
EST. GFR  (NO RACE VARIABLE): 20.4 ML/MIN/1.73 M^2
EST. GFR  (NO RACE VARIABLE): 20.4 ML/MIN/1.73 M^2
GLUCOSE SERPL-MCNC: 134 MG/DL (ref 70–110)
GLUCOSE SERPL-MCNC: 172 MG/DL (ref 70–110)
GLUCOSE SERPL-MCNC: 188 MG/DL (ref 70–110)
HCT VFR BLD AUTO: 25.3 % (ref 40–54)
HGB BLD-MCNC: 8 G/DL (ref 14–18)
IMM GRANULOCYTES # BLD AUTO: 0.03 K/UL (ref 0–0.04)
IMM GRANULOCYTES NFR BLD AUTO: 0.6 % (ref 0–0.5)
INR PPP: 3.4 (ref 0.8–1.2)
LDH SERPL L TO P-CCNC: 312 U/L (ref 110–260)
LYMPHOCYTES # BLD AUTO: 0.7 K/UL (ref 1–4.8)
LYMPHOCYTES NFR BLD: 13.9 % (ref 18–48)
MAGNESIUM SERPL-MCNC: 1.9 MG/DL (ref 1.6–2.6)
MAGNESIUM SERPL-MCNC: 1.9 MG/DL (ref 1.6–2.6)
MAGNESIUM SERPL-MCNC: 2.4 MG/DL (ref 1.6–2.6)
MCH RBC QN AUTO: 28.2 PG (ref 27–31)
MCHC RBC AUTO-ENTMCNC: 31.6 G/DL (ref 32–36)
MCV RBC AUTO: 89 FL (ref 82–98)
MONOCYTES # BLD AUTO: 0.5 K/UL (ref 0.3–1)
MONOCYTES NFR BLD: 9.5 % (ref 4–15)
NEUTROPHILS # BLD AUTO: 3.6 K/UL (ref 1.8–7.7)
NEUTROPHILS NFR BLD: 72.4 % (ref 38–73)
NRBC BLD-RTO: 0 /100 WBC
OHS QRS DURATION: 180 MS
OHS QTC CALCULATION: 531 MS
PLATELET # BLD AUTO: 291 K/UL (ref 150–450)
PMV BLD AUTO: 9.4 FL (ref 9.2–12.9)
POTASSIUM SERPL-SCNC: 3.5 MMOL/L (ref 3.5–5.1)
POTASSIUM SERPL-SCNC: 3.6 MMOL/L (ref 3.5–5.1)
POTASSIUM SERPL-SCNC: 4.1 MMOL/L (ref 3.5–5.1)
PREALB SERPL-MCNC: 29 MG/DL (ref 20–43)
PROT SERPL-MCNC: 7.2 G/DL (ref 6–8.4)
PROTHROMBIN TIME: 33.8 SEC (ref 9–12.5)
RBC # BLD AUTO: 2.84 M/UL (ref 4.6–6.2)
SODIUM SERPL-SCNC: 135 MMOL/L (ref 136–145)
SODIUM SERPL-SCNC: 138 MMOL/L (ref 136–145)
SODIUM SERPL-SCNC: 138 MMOL/L (ref 136–145)
WBC # BLD AUTO: 5.03 K/UL (ref 3.9–12.7)

## 2024-03-25 PROCEDURE — 80076 HEPATIC FUNCTION PANEL: CPT | Mod: NTX | Performed by: PHYSICIAN ASSISTANT

## 2024-03-25 PROCEDURE — 83615 LACTATE (LD) (LDH) ENZYME: CPT | Mod: NTX | Performed by: PHYSICIAN ASSISTANT

## 2024-03-25 PROCEDURE — 27000248 HC VAD-ADDITIONAL DAY: Mod: NTX

## 2024-03-25 PROCEDURE — 84134 ASSAY OF PREALBUMIN: CPT | Mod: NTX | Performed by: PHYSICIAN ASSISTANT

## 2024-03-25 PROCEDURE — 25000003 PHARM REV CODE 250: Mod: NTX | Performed by: STUDENT IN AN ORGANIZED HEALTH CARE EDUCATION/TRAINING PROGRAM

## 2024-03-25 PROCEDURE — 83735 ASSAY OF MAGNESIUM: CPT | Mod: 91,NTX | Performed by: PHYSICIAN ASSISTANT

## 2024-03-25 PROCEDURE — 83735 ASSAY OF MAGNESIUM: CPT | Mod: 91,NTX | Performed by: STUDENT IN AN ORGANIZED HEALTH CARE EDUCATION/TRAINING PROGRAM

## 2024-03-25 PROCEDURE — 63600175 PHARM REV CODE 636 W HCPCS: Mod: NTX | Performed by: STUDENT IN AN ORGANIZED HEALTH CARE EDUCATION/TRAINING PROGRAM

## 2024-03-25 PROCEDURE — 80048 BASIC METABOLIC PNL TOTAL CA: CPT | Mod: 91,NTX | Performed by: STUDENT IN AN ORGANIZED HEALTH CARE EDUCATION/TRAINING PROGRAM

## 2024-03-25 PROCEDURE — 97165 OT EVAL LOW COMPLEX 30 MIN: CPT | Mod: NTX

## 2024-03-25 PROCEDURE — 36415 COLL VENOUS BLD VENIPUNCTURE: CPT | Mod: NTX,XB | Performed by: STUDENT IN AN ORGANIZED HEALTH CARE EDUCATION/TRAINING PROGRAM

## 2024-03-25 PROCEDURE — 99233 SBSQ HOSP IP/OBS HIGH 50: CPT | Mod: NTX,,, | Performed by: INTERNAL MEDICINE

## 2024-03-25 PROCEDURE — 85610 PROTHROMBIN TIME: CPT | Mod: NTX | Performed by: PHYSICIAN ASSISTANT

## 2024-03-25 PROCEDURE — 80048 BASIC METABOLIC PNL TOTAL CA: CPT | Mod: 91,NTX | Performed by: PHYSICIAN ASSISTANT

## 2024-03-25 PROCEDURE — 63600150 PHARM REV CODE 636: Mod: NTX | Performed by: STUDENT IN AN ORGANIZED HEALTH CARE EDUCATION/TRAINING PROGRAM

## 2024-03-25 PROCEDURE — 20600001 HC STEP DOWN PRIVATE ROOM: Mod: NTX

## 2024-03-25 PROCEDURE — 25000003 PHARM REV CODE 250: Mod: NTX | Performed by: INTERNAL MEDICINE

## 2024-03-25 PROCEDURE — 80048 BASIC METABOLIC PNL TOTAL CA: CPT | Mod: NTX | Performed by: STUDENT IN AN ORGANIZED HEALTH CARE EDUCATION/TRAINING PROGRAM

## 2024-03-25 PROCEDURE — 83880 ASSAY OF NATRIURETIC PEPTIDE: CPT | Mod: NTX | Performed by: PHYSICIAN ASSISTANT

## 2024-03-25 PROCEDURE — 97161 PT EVAL LOW COMPLEX 20 MIN: CPT | Mod: NTX

## 2024-03-25 PROCEDURE — 85025 COMPLETE CBC W/AUTO DIFF WBC: CPT | Mod: NTX | Performed by: PHYSICIAN ASSISTANT

## 2024-03-25 PROCEDURE — 85730 THROMBOPLASTIN TIME PARTIAL: CPT | Mod: NTX | Performed by: PHYSICIAN ASSISTANT

## 2024-03-25 PROCEDURE — 36415 COLL VENOUS BLD VENIPUNCTURE: CPT | Mod: NTX | Performed by: PHYSICIAN ASSISTANT

## 2024-03-25 PROCEDURE — 97116 GAIT TRAINING THERAPY: CPT | Mod: NTX

## 2024-03-25 PROCEDURE — 25000003 PHARM REV CODE 250: Mod: NTX | Performed by: PHYSICIAN ASSISTANT

## 2024-03-25 PROCEDURE — 97535 SELF CARE MNGMENT TRAINING: CPT | Mod: NTX

## 2024-03-25 PROCEDURE — 90792 PSYCH DIAG EVAL W/MED SRVCS: CPT | Mod: AF,HB,, | Performed by: PSYCHIATRY & NEUROLOGY

## 2024-03-25 PROCEDURE — 94761 N-INVAS EAR/PLS OXIMETRY MLT: CPT | Mod: NTX

## 2024-03-25 PROCEDURE — 83735 ASSAY OF MAGNESIUM: CPT | Mod: NTX | Performed by: STUDENT IN AN ORGANIZED HEALTH CARE EDUCATION/TRAINING PROGRAM

## 2024-03-25 PROCEDURE — 93750 INTERROGATION VAD IN PERSON: CPT | Mod: NTX,,, | Performed by: INTERNAL MEDICINE

## 2024-03-25 RX ORDER — LANOLIN ALCOHOL/MO/W.PET/CERES
400 CREAM (GRAM) TOPICAL ONCE
Status: COMPLETED | OUTPATIENT
Start: 2024-03-25 | End: 2024-03-25

## 2024-03-25 RX ORDER — HYDRALAZINE HYDROCHLORIDE 25 MG/1
25 TABLET, FILM COATED ORAL ONCE
Status: COMPLETED | OUTPATIENT
Start: 2024-03-25 | End: 2024-03-25

## 2024-03-25 RX ORDER — FUROSEMIDE 10 MG/ML
80 INJECTION INTRAMUSCULAR; INTRAVENOUS ONCE
Status: COMPLETED | OUTPATIENT
Start: 2024-03-25 | End: 2024-03-25

## 2024-03-25 RX ORDER — POTASSIUM CHLORIDE 20 MEQ/1
40 TABLET, EXTENDED RELEASE ORAL 2 TIMES DAILY
Status: DISCONTINUED | OUTPATIENT
Start: 2024-03-25 | End: 2024-03-26

## 2024-03-25 RX ORDER — MAGNESIUM SULFATE HEPTAHYDRATE 40 MG/ML
2 INJECTION, SOLUTION INTRAVENOUS ONCE
Status: COMPLETED | OUTPATIENT
Start: 2024-03-25 | End: 2024-03-25

## 2024-03-25 RX ORDER — POTASSIUM CHLORIDE 20 MEQ/1
40 TABLET, EXTENDED RELEASE ORAL ONCE
Status: COMPLETED | OUTPATIENT
Start: 2024-03-25 | End: 2024-03-25

## 2024-03-25 RX ADMIN — HYDRALAZINE HYDROCHLORIDE 75 MG: 50 TABLET ORAL at 09:03

## 2024-03-25 RX ADMIN — HYDRALAZINE HYDROCHLORIDE 50 MG: 50 TABLET ORAL at 04:03

## 2024-03-25 RX ADMIN — VENLAFAXINE 37.5 MG: 37.5 TABLET ORAL at 08:03

## 2024-03-25 RX ADMIN — AMIODARONE HYDROCHLORIDE 400 MG: 200 TABLET ORAL at 08:03

## 2024-03-25 RX ADMIN — POTASSIUM CHLORIDE 40 MEQ: 1500 TABLET, EXTENDED RELEASE ORAL at 02:03

## 2024-03-25 RX ADMIN — HYDRALAZINE HYDROCHLORIDE 25 MG: 25 TABLET ORAL at 08:03

## 2024-03-25 RX ADMIN — SODIUM BICARBONATE 325 MG: 325 TABLET ORAL at 09:03

## 2024-03-25 RX ADMIN — Medication 400 MG: at 02:03

## 2024-03-25 RX ADMIN — POTASSIUM CHLORIDE 40 MEQ: 1500 TABLET, EXTENDED RELEASE ORAL at 08:03

## 2024-03-25 RX ADMIN — MAGNESIUM SULFATE HEPTAHYDRATE 2 G: 40 INJECTION, SOLUTION INTRAVENOUS at 09:03

## 2024-03-25 RX ADMIN — SODIUM BICARBONATE 325 MG: 325 TABLET ORAL at 03:03

## 2024-03-25 RX ADMIN — CHOLECALCIFEROL TAB 25 MCG (1000 UNIT) 1000 UNITS: 25 TAB at 08:03

## 2024-03-25 RX ADMIN — HYDRALAZINE HYDROCHLORIDE 75 MG: 50 TABLET ORAL at 03:03

## 2024-03-25 RX ADMIN — Medication 400 MG: at 08:03

## 2024-03-25 RX ADMIN — FAMOTIDINE 20 MG: 20 TABLET, FILM COATED ORAL at 08:03

## 2024-03-25 RX ADMIN — POTASSIUM CHLORIDE 40 MEQ: 1500 TABLET, EXTENDED RELEASE ORAL at 09:03

## 2024-03-25 RX ADMIN — FUROSEMIDE 80 MG: 10 INJECTION, SOLUTION INTRAVENOUS at 10:03

## 2024-03-25 RX ADMIN — GABAPENTIN 100 MG: 100 CAPSULE ORAL at 08:03

## 2024-03-25 RX ADMIN — TRAZODONE HYDROCHLORIDE 25 MG: 50 TABLET ORAL at 09:03

## 2024-03-25 RX ADMIN — SODIUM CHLORIDE 20 MG/HR: 9 INJECTION, SOLUTION INTRAVENOUS at 06:03

## 2024-03-25 RX ADMIN — SODIUM BICARBONATE 325 MG: 325 TABLET ORAL at 08:03

## 2024-03-25 RX ADMIN — GABAPENTIN 100 MG: 100 CAPSULE ORAL at 09:03

## 2024-03-25 RX ADMIN — WARFARIN SODIUM 1.5 MG: 1 TABLET ORAL at 05:03

## 2024-03-25 NOTE — ASSESSMENT & PLAN NOTE
Procedure: Device Interrogation Including analysis of device parameters  Current Settings: Ventricular Assist Device  INR supratherapeutic  Review of device function is stable/unstable stable  Anticoagulation w/ coumadin. Goal INR 2-3.         3/25/2024    11:28 AM 3/25/2024     8:00 AM 3/25/2024     4:27 AM 3/24/2024    11:21 PM 3/24/2024     7:49 PM 3/24/2024     1:53 PM 3/21/2024     7:29 AM   TXP LVAD INTERROGATIONS   Type HeartMate3 HeartMate3 HeartMate3 HeartMate3 HeartMate3 HeartMate3    Flow 3.8 3.4 3.7 3.6 3.9 3.6    Speed 5000 5000 5050 5000 5000 5000    PI 5.6 7.9 5.7 6.2 4.4 7.2    Power ([place]) 3.4 3.4 3.4 3.4 3.5 3.4    LSL     4600     Pulsatility   Pulse Pulse Pulse Pulse No Pulse

## 2024-03-25 NOTE — PLAN OF CARE
Patient cooperative and pleasant with routine care and procedures.  Fall precautions reviewed and patient verbalized understanding.  Spouse at bedside and participates in routine ADL's with patient.  Resting quietly without complaints.  IV lasix infusing as ordered and accurate intake and output maintained.  Will continue to monitor.

## 2024-03-25 NOTE — NURSING
Dr. Garcia notified of lab values.  Potassium 3.5 and magnesium 1.9.  Orders received for oral replacements.  Given and labs pending in am.

## 2024-03-25 NOTE — SUBJECTIVE & OBJECTIVE
Interval History: NAEON. Patient states he feels much better today.     Continuous Infusions:   furosemide (LASIX) 2 mg/mL continuous infusion (non-titrating) 20 mg/hr (03/25/24 1005)     Scheduled Meds:   amiodarone  400 mg Oral Daily    famotidine  20 mg Oral Daily    fluticasone propionate  2 spray Each Nostril Daily    gabapentin  100 mg Oral BID    hydrALAZINE  75 mg Oral Q8H    magnesium oxide  400 mg Oral Daily    potassium chloride  40 mEq Oral BID    sodium bicarbonate  325 mg Oral TID    traZODone  25 mg Oral QHS    venlafaxine  37.5 mg Oral Daily    vitamin D  1,000 Units Oral Daily     PRN Meds:acetaminophen, polyethylene glycol, senna-docusate 8.6-50 mg    Review of patient's allergies indicates:  No Known Allergies  Objective:     Vital Signs (Most Recent):  Temp: 98.3 °F (36.8 °C) (03/25/24 1157)  Pulse: 88 (03/25/24 1127)  Resp: 19 (03/25/24 1157)  BP: (!) 96/0 (03/25/24 1127)  SpO2: 98 % (03/25/24 1127) Vital Signs (24h Range):  Temp:  [97.6 °F (36.4 °C)-98.3 °F (36.8 °C)] 98.3 °F (36.8 °C)  Pulse:  [64-88] 88  Resp:  [15-23] 19  SpO2:  [96 %-100 %] 98 %  BP: ()/(0-99) 96/0     Patient Vitals for the past 72 hrs (Last 3 readings):   Weight   03/24/24 1942 36.3 kg (80 lb 0.8 oz)   03/24/24 1208 81.6 kg (180 lb)     Body mass index is 10.28 kg/m².      Intake/Output Summary (Last 24 hours) at 3/25/2024 1226  Last data filed at 3/25/2024 1217  Gross per 24 hour   Intake 572 ml   Output 1900 ml   Net -1328 ml       Hemodynamic Parameters:            Physical Exam  Constitutional:       General: He is not in acute distress.     Appearance: Normal appearance.   HENT:      Head: Normocephalic and atraumatic.   Eyes:      Conjunctiva/sclera: Conjunctivae normal.   Neck:      Vascular: JVD present.      Comments: JVP 14cm of H20.   Cardiovascular:      Comments: VAD hum appreciated  Pulmonary:      Breath sounds: Normal breath sounds.      Comments: Clear to auscultation  Abdominal:      Comments:  Soft, non-tender, non-distended   Musculoskeletal:      Cervical back: Normal range of motion.      Comments: No peripheral edema   Skin:     General: Skin is warm and dry.      Capillary Refill: Capillary refill takes less than 2 seconds.   Neurological:      Mental Status: He is alert and oriented to person, place, and time.   Psychiatric:         Mood and Affect: Mood and affect normal.            Significant Labs:  CBC:  Recent Labs   Lab 03/21/24  0857 03/24/24  1320 03/25/24  0428   WBC 5.47 5.18 5.03   RBC 2.95* 2.99* 2.84*   HGB 8.5* 8.5* 8.0*   HCT 27.3* 27.3* 25.3*    318 291   MCV 93 91 89   MCH 28.8 28.4 28.2   MCHC 31.1* 31.1* 31.6*     BNP:  Recent Labs   Lab 03/21/24  0857 03/24/24  1320 03/25/24  0428   * 1,559* 1,101*     CMP:  Recent Labs   Lab 03/21/24  0857 03/24/24  1320 03/25/24  0030 03/25/24  0428   * 186* 172* 134*   CALCIUM 9.3 9.2 9.0 9.1   ALBUMIN 3.4* 3.4*  --  3.0*   PROT 7.5 8.2  --  7.2    137 138 138   K 4.3 4.2 3.5 3.6   CO2 24 26 25 25    103 103 103   BUN 55* 58* 53* 56*   CREATININE 3.5* 3.6* 3.5* 3.3*   ALKPHOS 112 119  --  107   ALT 53* 58*  --  55*   AST 34 39  --  39   BILITOT 0.3 0.3  --  0.3      Coagulation:   Recent Labs   Lab 03/21/24  0857 03/24/24  1320 03/25/24  0428   INR 3.4* 3.7* 3.4*   APTT  --   --  45.1*     LDH:  Recent Labs   Lab 03/25/24  0429   *     Microbiology:  Microbiology Results (last 7 days)       ** No results found for the last 168 hours. **            I have reviewed all pertinent labs within the past 24 hours.    Estimated Creatinine Clearance: 12.1 mL/min (A) (based on SCr of 3.3 mg/dL (H)).    Diagnostic Results:  I have reviewed all pertinent imaging results/findings within the past 24 hours.

## 2024-03-25 NOTE — PROGRESS NOTES
[facility] - Cardiology Stepdown  Heart Transplant  Progress Note    Patient Name: Radha Abbott  MRN: [id number]  Admission Date: 3/24/2024  Hospital Length of Stay: 1 days  Attending Physician: Sajan Hurley, *  Primary Care Provider: Vasu Kong MD  Principal Problem:Acute on chronic combined systolic and diastolic CHF, NYHA class 4    Subjective:   Interval History: NAEON. Patient states he feels much better today.     Continuous Infusions:   furosemide (LASIX) 2 mg/mL continuous infusion (non-titrating) 20 mg/hr (03/25/24 1005)     Scheduled Meds:   amiodarone  400 mg Oral Daily    famotidine  20 mg Oral Daily    fluticasone propionate  2 spray Each Nostril Daily    gabapentin  100 mg Oral BID    hydrALAZINE  75 mg Oral Q8H    magnesium oxide  400 mg Oral Daily    potassium chloride  40 mEq Oral BID    sodium bicarbonate  325 mg Oral TID    traZODone  25 mg Oral QHS    venlafaxine  37.5 mg Oral Daily    vitamin D  1,000 Units Oral Daily     PRN Meds:acetaminophen, polyethylene glycol, senna-docusate 8.6-50 mg    Review of patient's allergies indicates:  No Known Allergies  Objective:     Vital Signs (Most Recent):  Temp: 98.3 °F (36.8 °C) (03/25/24 1157)  Pulse: 88 (03/25/24 1127)  Resp: 19 (03/25/24 1157)  BP: (!) 96/0 (03/25/24 1127)  SpO2: 98 % (03/25/24 1127) Vital Signs (24h Range):  Temp:  [97.6 °F (36.4 °C)-98.3 °F (36.8 °C)] 98.3 °F (36.8 °C)  Pulse:  [64-88] 88  Resp:  [15-23] 19  SpO2:  [96 %-100 %] 98 %  BP: ()/(0-99) 96/0     Patient Vitals for the past 72 hrs (Last 3 readings):   Weight   03/24/24 1942 36.3 kg (80 lb 0.8 oz)   03/24/24 1208 81.6 kg (180 lb)     Body mass index is 10.28 kg/m².      Intake/Output Summary (Last 24 hours) at 3/25/2024 1226  Last data filed at 3/25/2024 1217  Gross per 24 hour   Intake 572 ml   Output 1900 ml   Net -1328 ml       Hemodynamic Parameters:            Physical Exam  Constitutional:       General: He is not in acute distress.      Appearance: Normal appearance.   HENT:      Head: Normocephalic and atraumatic.   Eyes:      Conjunctiva/sclera: Conjunctivae normal.   Neck:      Vascular: JVD present.      Comments: JVP 14cm of H20.   Cardiovascular:      Comments: VAD hum appreciated  Pulmonary:      Breath sounds: Normal breath sounds.      Comments: Clear to auscultation  Abdominal:      Comments: Soft, non-tender, non-distended   Musculoskeletal:      Cervical back: Normal range of motion.      Comments: No peripheral edema   Skin:     General: Skin is warm and dry.      Capillary Refill: Capillary refill takes less than 2 seconds.   Neurological:      Mental Status: He is alert and oriented to person, place, and time.   Psychiatric:         Mood and Affect: Mood and affect normal.            Significant Labs:  CBC:  Recent Labs   Lab 03/21/24  0857 03/24/24  1320 03/25/24  0428   WBC 5.47 5.18 5.03   RBC 2.95* 2.99* 2.84*   HGB 8.5* 8.5* 8.0*   HCT 27.3* 27.3* 25.3*    318 291   MCV 93 91 89   MCH 28.8 28.4 28.2   MCHC 31.1* 31.1* 31.6*     BNP:  Recent Labs   Lab 03/21/24  0857 03/24/24  1320 03/25/24  0428   * 1,559* 1,101*     CMP:  Recent Labs   Lab 03/21/24  0857 03/24/24  1320 03/25/24  0030 03/25/24  0428   * 186* 172* 134*   CALCIUM 9.3 9.2 9.0 9.1   ALBUMIN 3.4* 3.4*  --  3.0*   PROT 7.5 8.2  --  7.2    137 138 138   K 4.3 4.2 3.5 3.6   CO2 24 26 25 25    103 103 103   BUN 55* 58* 53* 56*   CREATININE 3.5* 3.6* 3.5* 3.3*   ALKPHOS 112 119  --  107   ALT 53* 58*  --  55*   AST 34 39  --  39   BILITOT 0.3 0.3  --  0.3      Coagulation:   Recent Labs   Lab 03/21/24  0857 03/24/24  1320 03/25/24  0428   INR 3.4* 3.7* 3.4*   APTT  --   --  45.1*     LDH:  Recent Labs   Lab 03/25/24  0429   *     Microbiology:  Microbiology Results (last 7 days)       ** No results found for the last 168 hours. **            I have reviewed all pertinent labs within the past 24 hours.    Estimated Creatinine  Clearance: 12.1 mL/min (A) (based on SCr of 3.3 mg/dL (H)).    Diagnostic Results:  I have reviewed all pertinent imaging results/findings within the past 24 hours.  Assessment and Plan:     Mr Abbott is a 64 y/o  with stage D HFrEF due to ICMP w/ CAD and Hx of CABG in 2009. He did undergo HM3 placement placed by Dr Washington on 12/1/23. Lengthy post op course complicated by vasoplegia(requiring Giaprezza), debility, malnutrition/impaired swallowing, and renal failure requiring HD(since weaned off). Once medically stable, he was transferred to Rehab for further PT/OT/ST. Patient has since been discharged from rehab and permacath removed.     He presents today via the ED due to increased fluid retention, weight gain, peripheral edema, poor appetite. He was seen in HTS clinic last week and his torsemide dose was increased to 80 mg BID in an attempt to get the extra fluid weight off but he continued to retain fluid since then. Now he endorses orthopnea, poor appetite, also is concern for depressed mood since his LVAD surgery. Denies N/V/D. Denies Chest pain, lightheadedness or syncope. BNP up from 700 to 1600. Weight 180 lbs today when goal in clinic recently was set to 170 lbs. Vital signs stable in the ED.     * Acute on chronic combined systolic and diastolic CHF, NYHA class 4  Patient hypervolemic on exam today. Rebolus lasix and increase lasix gtt to 20.  Home diuretic torsemide 80 BID  GDMT limited to hydralazine due to CKD  1500cc/day fluid restriction  Replace K and mag    Depressed mood  Patient interested in psychiatry consult and would like to start an antidepressant  Will consult psychiatry     LVAD (left ventricular assist device) present  Procedure: Device Interrogation Including analysis of device parameters  Current Settings: Ventricular Assist Device  INR supratherapeutic  Review of device function is stable/unstable stable  Anticoagulation w/ coumadin. Goal INR 2-3.         3/25/2024    11:28 AM 3/25/2024      8:00 AM 3/25/2024     4:27 AM 3/24/2024    11:21 PM 3/24/2024     7:49 PM 3/24/2024     1:53 PM 3/21/2024     7:29 AM   TXP LVAD INTERROGATIONS   Type HeartMate3 HeartMate3 HeartMate3 HeartMate3 HeartMate3 HeartMate3    Flow 3.8 3.4 3.7 3.6 3.9 3.6    Speed 5000 5000 5050 5000 5000 5000    PI 5.6 7.9 5.7 6.2 4.4 7.2    Power ([place]) 3.4 3.4 3.4 3.4 3.5 3.4    LSL     4600     Pulsatility   Pulse Pulse Pulse Pulse No Pulse         Ventricular tachycardia  Continue amio 400 qd        Angela Shen MD  Heart Transplant  [facility] - Cardiology Stepdown

## 2024-03-25 NOTE — PLAN OF CARE
PT evaluation completed. Pt ambulating 200' with Supervision. Pt functioning at baseline PLOF, d/c acute PT services at this time. Please re-consult if change in functional status.  Problem: Physical Therapy  Goal: Physical Therapy Goal  Description: Goals to be met by: 3/26/24     Patient will increase functional independence with mobility by performin. Gait  x 200 feet with Supervision using No Assistive Device.     Outcome: Met

## 2024-03-25 NOTE — PROGRESS NOTES
Pt and wife AAAO.  Both in good spirits.  Mrs. Abbott told me she received a box of dressing Friday but she didn't order any in clinic last week.  Looking through his clinic visit, he wasn't charged for dressing either.  Seeing it is the incorrect box or supplies, message sent to [employer] to see if supplies can be picked up via  and returned to [employer].  Waiting for reply. No other needs at this time.

## 2024-03-25 NOTE — DISCHARGE INSTRUCTIONS
[facility]    [facility]  (aka [facility], aka [facility])  Serves [facility], and [facility] residents.  Serves uninsured patients & those with Medicaid.  Main location: [address], [place] [id number] [phone]  Walk-in's available during regular business hours.  24/7 Crisis Line: [phone]    [facility]  (aka [facility])  Serves [facility].  Serves uninsured patients, those with Medicaid and some private plans.  Walk-in's available during regular business hours.  Primary care services available as well.  [facility]: [address] [place] [address];   [phone]  [facility]: [address] [place] [address];   [phone]  24/7 Crisis Line: [phone]    [facility]  [facility], [address]  [address]  Mon, Wed, Fri- 1-4:30pm  [phone], [phone]    [facility]  Serves uninsured patients & those with Medicaid, call for more info.  Primary care, pediatrics, HIV treatment, and dentistry services available as well.   Three locations.  [phone]    [facility]  Serves patients with Medicaid, Medicare, and private insurance  [address] [place] [address] [phone]    [facility]  Serves uninsured on a sliding scale, as well as Medicaid, Medicare, and private plans.  Eight locations around the [facility] area.  [phone]    [facility]  Serves uninsured patients & those with Medicaid, private insurances.  Primary care available as well.   [phone]  [address] [place] [address]    [facility]  Serves veterans who were honorably discharged.  [address], [place] [id number] [phone]   24/7  [facility]ans Crisis Line: [phone] (Press 1)    If you have private insurance and need to find a specialist, please contact your insurance network to request a list of providers covered by your benefits.

## 2024-03-25 NOTE — PLAN OF CARE
Pt free of falls/trauma/injuries. AAOx4 VSS Denies c/o SOB; O2Sats remain stable on room air. SATs remain above 90%.  Incentive spirometry encouraged throughout shift. LVAD numbers WNL. Dopplers elevated this morning, MD increased hydralazine to 75mg every 8 hours. Generalized skin remains CDI; minimal edema noted to BLEs. Pt being diuresed with lasix gtt; diuresing well. Wt trending down.  Electrolytes replaced as ordered.  PT/OT signed off today, Pt ambulating in mora independently with wife.  Educated on fall precautions and use of call bell. Pt tolerating plan of care. WCTM.    Problem: Adult Inpatient Plan of Care  Goal: Plan of Care Review  Outcome: Ongoing, Progressing  Goal: Patient-Specific Goal (Individualized)  Outcome: Ongoing, Progressing  Goal: Absence of Hospital-Acquired Illness or Injury  Outcome: Ongoing, Progressing  Goal: Optimal Comfort and Wellbeing  Outcome: Ongoing, Progressing  Goal: Readiness for Transition of Care  Outcome: Ongoing, Progressing     Problem: Diabetes Comorbidity  Goal: Blood Glucose Level Within Targeted Range  Outcome: Ongoing, Progressing     Problem: Impaired Wound Healing  Goal: Optimal Wound Healing  Outcome: Ongoing, Progressing     Problem: Adjustment to Illness (Heart Failure)  Goal: Optimal Coping  Outcome: Ongoing, Progressing     Problem: Right-Sided Heart Compromise (Ventricular Assist Device)  Goal: Effective Right-Sided Heart Function  Outcome: Ongoing, Progressing     Problem: Infection (Ventricular Assist Device)  Goal: Absence of Infection Signs and Symptoms  Outcome: Ongoing, Progressing

## 2024-03-25 NOTE — PT/OT/SLP EVAL
"Physical Therapy Co-Evaluation and Discharge Note    Patient Name:  Radha Abbott   MRN:  [id number]    Recommendations:     Discharge Recommendations: No Therapy Indicated  Discharge Equipment Recommendations: none   Barriers to discharge: None    Assessment:     Radha Abbott is a 63 y.o. male admitted with a medical diagnosis of Acute on chronic combined systolic and diastolic CHF, NYHA class 4. Patient functioning at baseline PLOF, ambulated >200' with Supervision A. Pt reports independence with LVAD management.  At this time, patient is functioning at their prior level of function and does not require further acute PT services.     Recent Surgery: * No surgery found *      Plan:     During this hospitalization, patient does not require further acute PT services.  Please re-consult if situation changes.      Subjective     Chief Complaint: "If I see yall again ill just wave from my room"  Patient/Family Comments/goals: go home  Pain/Comfort:  Pain Rating 1: 0/10  Pain Rating Post-Intervention 1: 0/10    Patients cultural, spiritual, Yazidism conflicts given the current situation: no    Living Environment:  Pt lives with his spouse in a [facility] with threshold step to enter. Pt has a WIS with a built in chair and grab bars.   Prior to admission, patients level of function was Independent with mobility; needs A for some ADLs.  Equipment used at home: shower chair, grab bar, BSC over toilet.  DME owned (not currently used):  rollator .  Upon discharge, patient will have assistance from wife.    Objective:     OT for co-evaluation due to suspected patient need for two skilled therapists to appropriately assess patient's functional deficits as well as ensure patient safety, accommodate for limited activity tolerance, and provide appropriate, skilled assistance to maximize functional potential during evaluation.    Communicated with RN prior to session.  Patient found  sitting on couch in room  with telemetry, LVAD, " peripheral IV upon PT entry to room.    General Precautions: Standard, fall, LVAD    Orthopedic Precautions:N/A   Braces: N/A  Respiratory Status: Room air    Exams:  Cognitive Exam:  Patient is oriented to Person, Place, Time, and Situation  Gross Motor Coordination:  WFL  Postural Exam:  Patient presented with the following abnormalities:    -       Rounded shoulders  -       Forward head  Sensation:    -       Intact  Skin Integrity/Edema:      -       Skin integrity: Visible skin intact  -       Edema: Mild BLE edema  RLE ROM: WFL  RLE Strength: WFL  LLE ROM: WFL  LLE Strength: WFL    Functional Mobility:  Bed Mobility:  pt found/left sitting on couch in room  Transfers:     Sit to Stand:  supervision with no AD  Gait: 200' with Supervision no AD  Pt with increased lateral sway and periods of scissored gait but no LOB/SOB reported   LVAD: Emergency bag present for out of room mobility   Balance:   Static standing: supervision  Dynamic standing: supervision    AM-PAC 6 CLICK MOBILITY  Total Score:18       Treatment and Education:  Pt educated on PT role and POC  Pt educated on importance of continued mobility while in hospital  LVAD: Pt found on battery power/returned on battery power. No alarms sounded    AM-PAC 6 CLICK MOBILITY  Total Score:18     Patient left  sitting on couch in room  with all lines intact, call button in reach, RN notified, and wife present.    GOALS:   Multidisciplinary Problems       Physical Therapy Goals       Not on file              Multidisciplinary Problems (Resolved)          Problem: Physical Therapy    Goal Priority Disciplines Outcome Goal Variances Interventions   Physical Therapy Goal   (Resolved)     PT, PT/OT Met     Description: Goals to be met by: 3/26/24     Patient will increase functional independence with mobility by performin. Gait  x 200 feet with Supervision using No Assistive Device.                          History:     Past Medical History:   Diagnosis Date     CAD (coronary artery disease)     CHF (congestive heart failure)     Diabetes mellitus     HFrEF (heart failure with reduced ejection fraction)     ICD (implantable cardioverter-defibrillator) in place     MI, old        Past Surgical History:   Procedure Laterality Date    ANGIOPLASTY-VENOUS ARTERY Right 12/1/2023    Procedure: ANGIOPLASTY-VENOUS ARTERY, RIGHT FEMORAL;  Surgeon: Yuri Washington MD;  Location: [facility] OR [address] [place] [address];  Service: Cardiovascular;  Laterality: Right;    AORTIC VALVULOPLASTY N/A 12/1/2023    Procedure: REPAIR, AORTIC VALVE;  Surgeon: Yuri Washington MD;  [facility];  Service: Cardiovascular;  Laterality: N/A;    CARDIAC SURGERY      CLOSURE N/A 12/1/2023    Procedure: CLOSURE, TEMPORARY;  Surgeon: Yuri Washington MD;  [facility];  Service: Cardiovascular;  Laterality: N/A;    DRAINAGE OF PLEURAL EFFUSION  12/4/2023    Procedure: DRAINAGE, PLEURAL EFFUSION;  Surgeon: Yuri Washington MD;  [facility];  Service: Cardiovascular;;    INSERTION OF GRAFT TO PERICARDIUM  12/4/2023    Procedure: INSERTION, GRAFT, PERICARDIUM;  Surgeon: Yuri Washington MD;  [facility];  Service: Cardiovascular;;    INSERTION OF INTRA-AORTIC BALLOON ASSIST DEVICE Right 11/21/2023    Procedure: INSERTION, INTRA-AORTIC BALLOON PUMP;  Surgeon: Finn Cohn MD;  Location: [facility] CATH LAB;  Service: Cardiology;  Laterality: Right;    LEFT VENTRICULAR ASSIST DEVICE Left 12/1/2023    Procedure: INSERTION-LEFT VENTRICULAR ASSIST DEVICE;  Surgeon: Yuri Washington MD;  [facility];  Service: Cardiovascular;  Laterality: Left;  REDO STERNOTOMY - REDO SAW NEEDED FOR CASE    LYSIS OF ADHESIONS  12/1/2023    Procedure: LYSIS, ADHESIONS;  Surgeon: Yuri Washington MD;  [facility];  Service: Cardiovascular;;    PLACEMENT OF SWAN ROLANDO CATHETER WITH IMAGING GUIDANCE  11/20/2023    Procedure: INSERTION, CATHETER, SWAN-ROLANDO, WITH IMAGING GUIDANCE;   Surgeon: Sajan Hurley MD;  Location: [facility] CATH LAB;  Service: Cardiology;;    REMOVAL OF TUNNELED CENTRAL VENOUS CATHETER (CVC) N/A 3/1/2024    Procedure: REMOVAL, CATHETER, CENTRAL VENOUS, TUNNELED;  Surgeon: Seble Aguilar MD;  Location: [facility] CATH LAB;  Service: Interventional Nephrology;  Laterality: N/A;    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 12/1/2023    Procedure: REPAIR, ANEURYSM, ARTERY, FEMORAL;  Surgeon: Yuri Washington MD;  [address]  Service: Cardiovascular;  Laterality: Right;  Right Femoral Artery Repair    RIGHT HEART CATHETERIZATION Right 10/10/2023    Procedure: INSERTION, CATHETER, RIGHT HEART;  Surgeon: Bin Gandhi MD;  Location: [facility] CATH LAB;  Service: Cardiology;  Laterality: Right;    RIGHT HEART CATHETERIZATION Right 10/13/2023    Procedure: INSERTION, CATHETER, RIGHT HEART;  Surgeon: Walter Mcintyre MD;  Location: [facility] CATH LAB;  Service: Cardiology;  Laterality: Right;    RIGHT HEART CATHETERIZATION  11/13/2023    RIGHT HEART CATHETERIZATION Right 11/13/2023    Procedure: INSERTION, CATHETER, RIGHT HEART;  Surgeon: Juventino Bermudez Jr., MD;  Location: [facility] CATH LAB;  Service: Cardiology;  Laterality: Right;    RIGHT HEART CATHETERIZATION Right 11/20/2023    Procedure: INSERTION, CATHETER, RIGHT HEART;  Surgeon: Sajan Hurley MD;  Location: [facility] CATH LAB;  Service: Cardiology;  Laterality: Right;    RIGHT HEART CATHETERIZATION Right 1/22/2024    Procedure: INSERTION, CATHETER, RIGHT HEART;  Surgeon: Brayan Ocampo MD;  Location: [facility] CATH LAB;  Service: Cardiology;  Laterality: Right;    STERNAL WOUND CLOSURE N/A 12/4/2023    Procedure: CLOSURE, WOUND, STERNUM;  Surgeon: Yuri Washington MD;  [address]  Service: Cardiovascular;  Laterality: N/A;    STERNOTOMY N/A 12/1/2023    Procedure: STERNOTOMY, REDO;  Surgeon: Yuri Washington MD;  [address]  Service: Cardiovascular;  Laterality: N/A;    VALVULOPLASTY,  MITRAL VALVE N/A 12/1/2023    Procedure: VALVULOPLASTY, MITRAL VALVE;  Surgeon: Yuri Washington MD;  Location: [facility] OR [address] [place] [address];  Service: Cardiovascular;  Laterality: N/A;       Time Tracking:     PT Received On: 03/25/24  PT Start Time: 1022     PT Stop Time: 1038  PT Total Time (min): 16 min     Billable Minutes: Evaluation 8 minutes and Gait Training 8 minutes      03/25/2024

## 2024-03-25 NOTE — PROGRESS NOTES
Patient was seen today in the hospital. Patient awake at time of visit. No family at bedside. Patient has no equipment needs at this time.

## 2024-03-25 NOTE — PT/OT/SLP EVAL
Occupational Therapy   Co-Evaluation, Co-Treatment and Discharge Note    Name: Radha Abbott  MRN: [id number]  Admitting Diagnosis: Acute on chronic combined systolic and diastolic CHF, NYHA class 4  Recent Surgery: * No surgery found *      Recommendations:     Discharge Recommendations: No Therapy Indicated  Discharge Equipment Recommendations: none  Barriers to discharge:  None    Assessment:     Radha Abbott is a 63 y.o. male with a medical diagnosis of Acute on chronic combined systolic and diastolic CHF, NYHA class 4. At this time, patient is functioning at their prior level of function and does not require further acute OT services.     Plan:     During this hospitalization, patient does not require further acute OT services.  Please re-consult if situation changes.    Plan of Care Reviewed with: patient, spouse    Subjective     Chief Complaint: none  Patient/Family Comments/goals: to go home     Occupational Profile:  Living Environment: Pt lives with spouse in a [facility] with threshold entry. Pt has a shower/tub combo for bathing.  Previous level of function: I with ADLs and mobility. (Recently at rehab using a RW however over the last week or so pt using no AD to ambulate). Pt primarily (I) with ADLs/mobility including LVAD management.  Roles and Routines: Pt has been enjoying doing things around home including ; drives. Home dwelling.  Equipment Used at home: rollator, other (see comments), shower chair, grab bar (BSC over toilet)  Assistance upon Discharge: spouse returned back to work however can A as needed.    Pain/Comfort:  Pain Rating 1: 0/10  Pain Rating Post-Intervention 1: 0/10    Patients cultural, spiritual, Gnosticist conflicts given the current situation: no    Objective:   Co-treatment with PT for maximal pt participation, safety, and activity tolerance     Communicated with: RN prior to session.  Patient found  sitting on bedside couch  with LVAD, telemetry, peripheral IV upon OT entry to  room.    General Precautions: Standard, fall, LVAD  Orthopedic Precautions: N/A  Braces: N/A  Respiratory Status: Room air     Occupational Performance:    Functional Mobility/Transfers:  Patient completed Sit <> Stand Transfer with supervision  with  no assistive device   Functional Mobility: Pt demonstrated functional mobility training to simulate household and community environment gait training during session. Pt tolerated ~8 minutes total using no AD with no LOB and good visual search and navigation strategies.  Pt on LVAD batteries upon arrival with LVAD emergency bag present for out of room mobility      Activities of Daily Living:  Lower body dressing: pt donning tennis shoes at edge of couch using figure 4 method  Toileting: setup A with this therapist delivering BSC    Cognitive/Visual Perceptual:  Cognitive/Psychosocial Skills:     -       Oriented to: Person, Place, Time, and Situation   -       Follows Commands/attention:Follows multistep  commands  -       Communication: clear/fluent  -       Memory: No Deficits noted  -       Safety awareness/insight to disability: intact   -       Mood/Affect/Coping skills/emotional control: Appropriate to situation    Physical Exam:  Balance:    -       demo good sitting and standing balance using no AD  Upper Extremity Range of Motion:     -       Right Upper Extremity: WFL  -       Left Upper Extremity: WFL  Upper Extremity Strength:    -       Right Upper Extremity: WFL  -       Left Upper Extremity: WFL   Strength:    -       Right Upper Extremity: WFL  -       Left Upper Extremity: WFL    AMPAC 6 Click ADL:  AMPAC Total Score: 21    Treatment & Education:  Pt educated on role of occupational therapy, POC, and safety during ADLs and functional mobility. Pt and OT discussed importance of safe, continued mobility to optimize daily living skills. Pt verbalized understanding.     White board updated during session. Pt given instruction to call for medical  staff/nurse for assistance.       Patient left  edge of couch  with all lines intact, call button in reach, RN notified, and spouse present    GOALS:   Multidisciplinary Problems       Occupational Therapy Goals       Not on file                    History:     Past Medical History:   Diagnosis Date    CAD (coronary artery disease)     CHF (congestive heart failure)     Diabetes mellitus     HFrEF (heart failure with reduced ejection fraction)     ICD (implantable cardioverter-defibrillator) in place     MI, old          Past Surgical History:   Procedure Laterality Date    ANGIOPLASTY-VENOUS ARTERY Right 12/1/2023    Procedure: ANGIOPLASTY-VENOUS ARTERY, RIGHT FEMORAL;  Surgeon: Yuri Washington MD;  Location: [facility] OR [address] [place] [address];  Service: Cardiovascular;  Laterality: Right;    AORTIC VALVULOPLASTY N/A 12/1/2023    Procedure: REPAIR, AORTIC VALVE;  Surgeon: Yuri Washington MD;  [facility];  Service: Cardiovascular;  Laterality: N/A;    CARDIAC SURGERY      CLOSURE N/A 12/1/2023    Procedure: CLOSURE, TEMPORARY;  Surgeon: Yuri Washington MD;  [address]  Service: Cardiovascular;  Laterality: N/A;    DRAINAGE OF PLEURAL EFFUSION  12/4/2023    Procedure: DRAINAGE, PLEURAL EFFUSION;  Surgeon: Yuri Washington MD;  [facility];  Service: Cardiovascular;;    INSERTION OF GRAFT TO PERICARDIUM  12/4/2023    Procedure: INSERTION, GRAFT, PERICARDIUM;  Surgeon: Yuri Washington MD;  Location: [facility] OR [address] [place] [address];  Service: Cardiovascular;;    INSERTION OF INTRA-AORTIC BALLOON ASSIST DEVICE Right 11/21/2023    Procedure: INSERTION, INTRA-AORTIC BALLOON PUMP;  Surgeon: Finn Cohn MD;  Location: [facility] CATH LAB;  Service: Cardiology;  Laterality: Right;    LEFT VENTRICULAR ASSIST DEVICE Left 12/1/2023    Procedure: INSERTION-LEFT VENTRICULAR ASSIST DEVICE;  Surgeon: Yuri Washington MD;  Location: [facility] OR [address] [place] [address];  Service: Cardiovascular;  Laterality: Left;  REDO STERNOTOMY - REDO SAW  NEEDED FOR CASE    LYSIS OF ADHESIONS  12/1/2023    Procedure: LYSIS, ADHESIONS;  Surgeon: Yuri Washington MD;  [facility];  Service: Cardiovascular;;    PLACEMENT OF SWAN ROLANDO CATHETER WITH IMAGING GUIDANCE  11/20/2023    Procedure: INSERTION, CATHETER, SWAN-ROLANDO, WITH IMAGING GUIDANCE;  Surgeon: Sajan Hurley MD;  Location: [facility] CATH LAB;  Service: Cardiology;;    REMOVAL OF TUNNELED CENTRAL VENOUS CATHETER (CVC) N/A 3/1/2024    Procedure: REMOVAL, CATHETER, CENTRAL VENOUS, TUNNELED;  Surgeon: Seble Aguilar MD;  Location: [facility] CATH LAB;  Service: Interventional Nephrology;  Laterality: N/A;    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 12/1/2023    Procedure: REPAIR, ANEURYSM, ARTERY, FEMORAL;  Surgeon: Yuri Washington MD;  [facility]  Service: Cardiovascular;  Laterality: Right;  Right Femoral Artery Repair    RIGHT HEART CATHETERIZATION Right 10/10/2023    Procedure: INSERTION, CATHETER, RIGHT HEART;  Surgeon: Bin Gandhi MD;  Location: [facility] CATH LAB;  Service: Cardiology;  Laterality: Right;    RIGHT HEART CATHETERIZATION Right 10/13/2023    Procedure: INSERTION, CATHETER, RIGHT HEART;  Surgeon: Walter Mcintyre MD;  Location: [facility] CATH LAB;  Service: Cardiology;  Laterality: Right;    RIGHT HEART CATHETERIZATION  11/13/2023    RIGHT HEART CATHETERIZATION Right 11/13/2023    Procedure: INSERTION, CATHETER, RIGHT HEART;  Surgeon: Juventino Bermudez Jr., MD;  Location: [facility] CATH LAB;  Service: Cardiology;  Laterality: Right;    RIGHT HEART CATHETERIZATION Right 11/20/2023    Procedure: INSERTION, CATHETER, RIGHT HEART;  Surgeon: Sajan Hurley MD;  Location: [facility] CATH LAB;  Service: Cardiology;  Laterality: Right;    RIGHT HEART CATHETERIZATION Right 1/22/2024    Procedure: INSERTION, CATHETER, RIGHT HEART;  Surgeon: Brayan Ocampo MD;  Location: [facility] CATH LAB;  Service: Cardiology;  Laterality: Right;    STERNAL WOUND CLOSURE N/A 12/4/2023    Procedure:  CLOSURE, WOUND, STERNUM;  Surgeon: Yuri Washington MD;  [facility];  Service: Cardiovascular;  Laterality: N/A;    STERNOTOMY N/A 12/1/2023    Procedure: STERNOTOMY, REDO;  Surgeon: Yuri Washington MD;  Location: [facility] OR [address] [place] [address];  Service: Cardiovascular;  Laterality: N/A;    VALVULOPLASTY, MITRAL VALVE N/A 12/1/2023    Procedure: VALVULOPLASTY, MITRAL VALVE;  Surgeon: Yuri Washington MD;  Location: [facility] OR [address] [place] [address];  Service: Cardiovascular;  Laterality: N/A;       Time Tracking:     OT Date of Treatment: 03/25/24  OT Start Time: 1022  OT Stop Time: 1038  OT Total Time (min): 16 min    Billable Minutes:Evaluation 8 min  Self Care/Home Management 8 min    3/25/2024

## 2024-03-25 NOTE — TELEPHONE ENCOUNTER
2267: Paged by patient spouse. Weight is up 2lbs since Thursday clinic despite doubling lasix to 80mg BID, abd distention is worse, poor appetite, bilat lower extremity edema, denies nausea, vomiting and shortness of breath. Verified correct dose of medications. State they do have metolazone 2.5mg at home if needed. Will discuss with MD on call.     Discussed with Dr Quach, do not take metolazone, report to ER for evaluation and possible admission for IV diuresis. Discussed plan with patient spouse. Reminded to bring emergency equipment with them to the hospital. Verbalized understanding.

## 2024-03-25 NOTE — NURSING
Patient admitted to [facility] via stretcher accompanied by spouse and transport.  Patient able to stand to get to bed with stand by assist.  Initial assessment completed and care plan initiated.  VAD equipment inventory completed and IV lasix drip initiated.  Instructed on fall precautions and keeping accurate intake and output.  Will continue to monitor.    Nurses Note -- 4 Eyes      3/25/2024   2:01 AM      Skin assessed during: Admit      [x] No Altered Skin Integrity Present    []Prevention Measures Documented      [] Yes- Altered Skin Integrity Present or Discovered   [] LDA Added if Not in Epic (Describe Wound)   [] New Altered Skin Integrity was Present on Admit and Documented in LDA   [] Wound Image Taken    Wound Care Consulted? No    Attending Nurse:  Lisa Pruitt RN     Second RN/Staff Member:  KYAW Bishop charge nurse  Nurses Note -- 4 Eyes

## 2024-03-25 NOTE — CONSULTS
CONSULTATION LIAISON PSYCHIATRY INITIAL EVALUATION    Patient Name: Radha Abbott  MRN: [id number]  Patient Class: IP- Inpatient  Admission Date: 3/24/2024  Attending Physician: Sajan Hurley, *      HPI:   Radha Abbott is a 63 y.o. male with no known psychiatric history & past pertinent medical history of CAD, CABG, CHF, diabetes, ICD, LVAD and previous MI presents to the ED/admitted to the hospital for Acute on chronic combined systolic and diastolic CHF, NYHA class 4    Psychiatry consulted for: Depression    This is a 62y/o male  who reports feelings of depression due to recent decompensation in his medical condition and associated impacts on his life.    Patient was seen in transplant clinic on Thursday 03/21/24 due to being fluid overloaded. 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 He states that this is the most significant stressor affecting his mood at this time, in addition to his readmission to the hospital. Patient endorses feelings of hopelessness secondary to changes in his life course following current hospitalization and his future employment. 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 Patient anticipates difficulty in transitioning to a non-driving job. Denies anhedonia, changes in concentration or energy or sleep. Patient has no issues initiating sleep, but reports awakening once overnight with the urgency to urinate. He reports that the fluid retention has made him bloated, which has secondarily decreased his appetite. Since his current admission and diuresis, he reports that his appetite has improved - currently eating breakfast during interview. At this time, patient reports that his mood is at a 5/10 (0=worst ; 10=best).    Patient had an LVAD placed on 12/1/23 following which he had a lengthy post-op course, including rehab for  "further PT/OT/ST. During that hospital stay, patient was started on Effexor 37.5mg qd and Trazodone 50mg qd. When asked why those medications were started, patient explains that his wife had left his room for a prolonged period of time, prompting some frustration and anger in him. Upon her return to the patient's room, they had a conversation during which he mentioned that he was "ready to die." The conversation was overheard by the nurse and shared with the team, which prompted CL consult, psychology consults and psychiatric medication trials. At that time, he reports that he was not ready to talk about his condition which led him to feel that the psychology team was "pushy." Patient has been compliant with his Effexor and reports no side effects, but states that the pharmacy was out of stock of the Desyrel and therefore was never started. Denies nausea, vomiting, abdominal pain. Denies CP, SOB at this time. Denies SI, HI, AH, VH.    On resident interview the patient affirmed the above account of his presentation and current symptoms. Denies acute symptoms of depression outside the significant life change he is experiencing including being unable to continue in his livelihood as he had previously. Pt is agreeable to continuing Effexor and Trazodone at this time. Declines offer for psychology consult but counseled on outpatient psychology and psychiatry follow up.     Medical Review of Systems:  Pertinent items are noted in HPI.    Psychiatric Review of Systems (is patient experiencing or having changes in):  sleep: no  appetite: yes, secondary to fluid retention. Improving with diuresis.  weight: no  energy/anergy: no  interest/pleasure/anhedonia: no  somatic symptoms: no  libido: no  anxiety/panic: no  guilty/hopelessness: yes  concentration: no  Airam:no  Psychosis: no  Trauma: no  S.I.B.s/risky behavior: no    Past Psychiatric History:  Previous Medication Trials: yes - Effexor 37.5mg q.d; Desyrel 50mg, seroquel " "   Previous Psychiatric Hospitalizations:no   Previous Suicide Attempts: no  History of Violence: no  Outpatient Psychiatrist: no  Family Psychiatric History: no    Substance Abuse History (with emphasis over the last 12 months):  Recreational Drugs:  denies  Use of Alcohol: minimal use  Tobacco Use: former smoker. Quit s/p MI in September 2023.  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 See HPI.  :no  Education: high school diploma/GED  Special Ed: no  Housing Status: with spouse  Lives in [place]  Access to gun: Yes - "7 or 8 of them" - Locked up.  Psychosocial Stressors: health and occupational  Functioning Relationships: good support system, good relationship with spouse or significant other, and good relationship with children  Hobby: sitting on his porch, fishing with his nephew. Denies anhedonia, but expresses concern about the impact of his health on his future leisure time.    Legal History:  Past Charges/Incarcerations: no  Pending charges:no    Mental Status Exam:  General Appearance: appears stated age, well developed and nourished, adequately groomed and appropriately dressed, in no acute distress  Behavior: normal; cooperative; reasonably friendly, pleasant, and polite; appropriate eye-contact; under good behavioral control  Involuntary Movements and Motor Activity: no abnormal involuntary movements noted; no tics, no tremors, no akathisia, no dystonia, no evidence of tardive dyskinesia; no psychomotor agitation or retardation  Gait and Station: intact, normal gait and station, ambulates without assistance  Speech and Language: intact; normal rate, rhythm, volume, tone, and pitch; conversational, spontaneous, and coherent; speaks and understands English proficiently and fluently; repeats words and phrases, no word " "finding difficulties are noted  Mood: "decent"  Affect: reactive, appropriate to situation and context, constricted  Thought Process and Associations: intact; linear, goal-directed, organized, and logical; no loosening of associations noted  Thought Content and Perceptions:: no suicidal or homicidal ideation, no auditory or visual hallucinations, no paranoid ideation, no ideas of reference, no evidence of delusions or psychosis  Sensorium and Orientation: intact; alert with clear sensorium; oriented fully to person, place, time and situation  Recent and Remote Memory: grossly intact, able to recall relevant and salient information from the recent and remote past  Attention and Concentration: grossly intact, attentive to the conversation and not readily distractible  Fund of Knowledge: grossly intact, used appropriate vocabulary and demonstrated an awareness of current events, consistent with educational level achieved  Insight: intact, demonstrates awareness of illness and situation  Judgment: intact, behavior is adequate/appropriate to the circumstances, compliant with health provider's recommendations and instructions    CAM ICU positive? no      ASSESSMENT & RECOMMENDATIONS   Adjustment disorder with depressed mood    Adjustment disorder with depressed mood  PSYCH MEDICATIONS  Scheduled: continue Effexor 37.5mg daily  Continue Trazodone 25mg qhs for insomnia  PRN: none at this time.   On discharge would recommend continuing Effexor 37.5mg and can utilize Trazodone 25mg qhs as a PRN for insomnia.     RISK ASSESSMENT  NO NEED FOR PEC patient NOT in any imminent danger of hurting self or others and not gravely disabled.     FOLLOW UP  Will sign off. Patient can follow up with an outpatient mental health provider - recommend psychology/psychotherapy and psychiatry follow up. Resources provided in patient's discharge instructions.    DISPOSITION - once medically cleared:   Defer to medical team    Please contact ON " CALL psychiatry service (24/7) for any acute issues that may arise.    Terrell Romero MS3    Patient interviewed and examined by myself and medical student. Note originally drafted by medical student and submitted with my own revisions and additions. Note as submitted is true to my independent evaluation, examination, and plan for this patient.     Rory Aaron MD  [facility] PGY-II   Psychiatry   [facility]  03/25/2024 8:25am        --------------------------------------------------------------------------------------------------------------------------------------------------------------------------------------------------------------------------------------    CONTINUED HISTORY & OBJECTIVE clinical data & findings reviewed and noted for above decision making    Past Medical/Surgical History:   Past Medical History:   Diagnosis Date    CAD (coronary artery disease)     CHF (congestive heart failure)     Diabetes mellitus     HFrEF (heart failure with reduced ejection fraction)     ICD (implantable cardioverter-defibrillator) in place     MI, old      Past Surgical History:   Procedure Laterality Date    ANGIOPLASTY-VENOUS ARTERY Right 12/1/2023    Procedure: ANGIOPLASTY-VENOUS ARTERY, RIGHT FEMORAL;  Surgeon: Yuri Washington MD;  Location: [facility] OR [address] [place] [address];  Service: Cardiovascular;  Laterality: Right;    AORTIC VALVULOPLASTY N/A 12/1/2023    Procedure: REPAIR, AORTIC VALVE;  Surgeon: Yuri Washington MD;  Location: [facility] OR [address] [place] [address];  Service: Cardiovascular;  Laterality: N/A;    CARDIAC SURGERY      CLOSURE N/A 12/1/2023    Procedure: CLOSURE, TEMPORARY;  Surgeon: Yuri Washington MD;  Location: [facility] OR 2ND FLR;  Service: Cardiovascular;  Laterality: N/A;    DRAINAGE OF PLEURAL EFFUSION  12/4/2023    Procedure: DRAINAGE, PLEURAL EFFUSION;  Surgeon: Yuri Washington MD;  Location: [facility] OR 2ND FLR;  Service: Cardiovascular;;    INSERTION OF GRAFT TO PERICARDIUM  12/4/2023     Procedure: INSERTION, GRAFT, PERICARDIUM;  Surgeon: Yuri Washington MD;  [facility];  Service: Cardiovascular;;    INSERTION OF INTRA-AORTIC BALLOON ASSIST DEVICE Right 11/21/2023    Procedure: INSERTION, INTRA-AORTIC BALLOON PUMP;  Surgeon: Finn Cohn MD;  Location: [facility] CATH LAB;  Service: Cardiology;  Laterality: Right;    LEFT VENTRICULAR ASSIST DEVICE Left 12/1/2023    Procedure: INSERTION-LEFT VENTRICULAR ASSIST DEVICE;  Surgeon: Yuri Washington MD;  [facility];  Service: Cardiovascular;  Laterality: Left;  REDO STERNOTOMY - REDO SAW NEEDED FOR CASE    LYSIS OF ADHESIONS  12/1/2023    Procedure: LYSIS, ADHESIONS;  Surgeon: Yuri Washington MD;  [facility];  Service: Cardiovascular;;    PLACEMENT OF SWAN ROLANDO CATHETER WITH IMAGING GUIDANCE  11/20/2023    Procedure: INSERTION, CATHETER, SWAN-ROLANDO, WITH IMAGING GUIDANCE;  Surgeon: Sajan Hurley MD;  Location: [facility] CATH LAB;  Service: Cardiology;;    REMOVAL OF TUNNELED CENTRAL VENOUS CATHETER (CVC) N/A 3/1/2024    Procedure: REMOVAL, CATHETER, CENTRAL VENOUS, TUNNELED;  Surgeon: Seble Aguilar MD;  Location: [facility] CATH LAB;  Service: Interventional Nephrology;  Laterality: N/A;    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 12/1/2023    Procedure: REPAIR, ANEURYSM, ARTERY, FEMORAL;  Surgeon: Yuri Washington MD;  Location: [address];  Service: Cardiovascular;  Laterality: Right;  Right Femoral Artery Repair    RIGHT HEART CATHETERIZATION Right 10/10/2023    Procedure: INSERTION, CATHETER, RIGHT HEART;  Surgeon: Bin Gandhi MD;  Location: [facility] CATH LAB;  Service: Cardiology;  Laterality: Right;    RIGHT HEART CATHETERIZATION Right 10/13/2023    Procedure: INSERTION, CATHETER, RIGHT HEART;  Surgeon: Walter Mcintyre MD;  Location: [facility] CATH LAB;  Service: Cardiology;  Laterality: Right;    RIGHT HEART CATHETERIZATION  11/13/2023    RIGHT HEART CATHETERIZATION Right 11/13/2023    Procedure:  INSERTION, CATHETER, RIGHT HEART;  Surgeon: Juventino Bermudez Jr., MD;  Location: [facility] CATH LAB;  Service: Cardiology;  Laterality: Right;    RIGHT HEART CATHETERIZATION Right 11/20/2023    Procedure: INSERTION, CATHETER, RIGHT HEART;  Surgeon: Sajan Hurley MD;  Location: [facility] CATH LAB;  Service: Cardiology;  Laterality: Right;    RIGHT HEART CATHETERIZATION Right 1/22/2024    Procedure: INSERTION, CATHETER, RIGHT HEART;  Surgeon: Brayan Ocampo MD;  Location: [facility] CATH LAB;  Service: Cardiology;  Laterality: Right;    STERNAL WOUND CLOSURE N/A 12/4/2023    Procedure: CLOSURE, WOUND, STERNUM;  Surgeon: Yuri Washington MD;  Location: [facility] OR 2ND FLR;  Service: Cardiovascular;  Laterality: N/A;    STERNOTOMY N/A 12/1/2023    Procedure: STERNOTOMY, REDO;  Surgeon: Yuri Washington MD;  Location: [facility] OR 2ND FLR;  Service: Cardiovascular;  Laterality: N/A;    VALVULOPLASTY, MITRAL VALVE N/A 12/1/2023    Procedure: VALVULOPLASTY, MITRAL VALVE;  Surgeon: Yuri Washington MD;  Location: [facility] OR 2ND FLR;  Service: Cardiovascular;  Laterality: N/A;       Current Medications:   Scheduled Meds:    amiodarone  400 mg Oral Daily    famotidine  20 mg Oral Daily    fluticasone propionate  2 spray Each Nostril Daily    gabapentin  100 mg Oral BID    hydrALAZINE  75 mg Oral Q8H    magnesium oxide  400 mg Oral Daily    potassium chloride  40 mEq Oral BID    sodium bicarbonate  325 mg Oral TID    traZODone  25 mg Oral QHS    venlafaxine  37.5 mg Oral Daily    vitamin D  1,000 Units Oral Daily     PRN Meds: acetaminophen, polyethylene glycol, senna-docusate 8.6-50 mg    Allergies:   Review of patient's allergies indicates:  No Known Allergies    Vitals  Vitals:    03/25/24 1400   BP:    Pulse: 85   Resp:    Temp:        Labs/Imaging/Studies:  Recent Results (from the past 24 hour(s))   Basic metabolic panel    Collection Time: 03/25/24 12:30 AM   Result Value Ref Range    Sodium 138 136 - 145 mmol/L    Potassium 3.5 3.5 -  5.1 mmol/L    Chloride 103 95 - 110 mmol/L    CO2 25 23 - 29 mmol/L    Glucose 172 (H) 70 - 110 mg/dL    BUN 53 (H) 8 - 23 mg/dL    Creatinine 3.5 (H) 0.5 - 1.4 mg/dL    Calcium 9.0 8.7 - 10.5 mg/dL    Anion Gap 10 8 - 16 mmol/L    eGFR 19.0 (A) >60 mL/min/1.73 m^2   Magnesium    Collection Time: 03/25/24 12:30 AM   Result Value Ref Range    Magnesium 1.9 1.6 - 2.6 mg/dL   CBC auto differential    Collection Time: 03/25/24  4:28 AM   Result Value Ref Range    WBC 5.03 3.90 - 12.70 K/uL    RBC 2.84 (L) 4.60 - 6.20 M/uL    Hemoglobin 8.0 (L) 14.0 - 18.0 g/dL    Hematocrit 25.3 (L) 40.0 - 54.0 %    MCV 89 82 - 98 fL    MCH 28.2 27.0 - 31.0 pg    MCHC 31.6 (L) 32.0 - 36.0 g/dL    RDW 17.2 (H) 11.5 - 14.5 %    Platelets 291 150 - 450 K/uL    MPV 9.4 9.2 - 12.9 fL    Immature Granulocytes 0.6 (H) 0.0 - 0.5 %    Gran # (ANC) 3.6 1.8 - 7.7 K/uL    Immature Grans (Abs) 0.03 0.00 - 0.04 K/uL    Lymph # 0.7 (L) 1.0 - 4.8 K/uL    Mono # 0.5 0.3 - 1.0 K/uL    Eos # 0.1 0.0 - 0.5 K/uL    Baso # 0.06 0.00 - 0.20 K/uL    nRBC 0 0 /100 WBC    Gran % 72.4 38.0 - 73.0 %    Lymph % 13.9 (L) 18.0 - 48.0 %    Mono % 9.5 4.0 - 15.0 %    Eosinophil % 2.4 0.0 - 8.0 %    Basophil % 1.2 0.0 - 1.9 %    Differential Method Automated    Basic metabolic panel    Collection Time: 03/25/24  4:28 AM   Result Value Ref Range    Sodium 138 136 - 145 mmol/L    Potassium 3.6 3.5 - 5.1 mmol/L    Chloride 103 95 - 110 mmol/L    CO2 25 23 - 29 mmol/L    Glucose 134 (H) 70 - 110 mg/dL    BUN 56 (H) 8 - 23 mg/dL    Creatinine 3.3 (H) 0.5 - 1.4 mg/dL    Calcium 9.1 8.7 - 10.5 mg/dL    Anion Gap 10 8 - 16 mmol/L    eGFR 20.4 (A) >60 mL/min/1.73 m^2   Magnesium    Collection Time: 03/25/24  4:28 AM   Result Value Ref Range    Magnesium 1.9 1.6 - 2.6 mg/dL   APTT    Collection Time: 03/25/24  4:28 AM   Result Value Ref Range    aPTT 45.1 (H) 21.0 - 32.0 sec   Protime-INR    Collection Time: 03/25/24  4:28 AM   Result Value Ref Range    Prothrombin Time 33.8 (H)  9.0 - 12.5 sec    INR 3.4 (H) 0.8 - 1.2   Brain natriuretic peptide    Collection Time: 03/25/24  4:28 AM   Result Value Ref Range    BNP 1,101 (H) 0 - 99 pg/mL   Prealbumin    Collection Time: 03/25/24  4:28 AM   Result Value Ref Range    Prealbumin 29 20 - 43 mg/dL   Hepatic function panel    Collection Time: 03/25/24  4:28 AM   Result Value Ref Range    Total Protein 7.2 6.0 - 8.4 g/dL    Albumin 3.0 (L) 3.5 - 5.2 g/dL    Total Bilirubin 0.3 0.1 - 1.0 mg/dL    Bilirubin, Direct 0.1 0.1 - 0.3 mg/dL    AST 39 10 - 40 U/L    ALT 55 (H) 10 - 44 U/L    Alkaline Phosphatase 107 55 - 135 U/L   Lactate dehydrogenase    Collection Time: 03/25/24  4:29 AM   Result Value Ref Range     (H) 110 - 260 U/L     Imaging Results              X-Ray Chest AP Portable (Final result)  Result time 03/24/24 14:25:31      Final result by Cate Wilkes MD (03/24/24 14:25:31)                   Impression:      Minimal bibasilar atelectatic changes.    Possible trace of left pleural fluid      Electronically signed by: Cate Wilkes MD  Date:    03/24/2024  Time:    14:25               Narrative:    EXAMINATION:  XR CHEST AP PORTABLE    CLINICAL HISTORY:  edema;    TECHNIQUE:  Single frontal view of the chest was performed.    COMPARISON:  02/01/2024    FINDINGS:  There is a cardio stimulator device in the left upper chest with a defibrillator lead.  LVAD.    Sternotomy wires.    The cardiac silhouette is mildly prominent.    Mild perihilar increased lung markings, unchanged.    Minimal atelectasis at the bilateral lung base, left more than right, and possible small left pleural effusion.    The osseous structures appear within normal limits.  No pneumothorax.                                    Terrell Romero, MS3

## 2024-03-25 NOTE — MEDICAL/APP STUDENT
CONSULTATION LIAISON PSYCHIATRY INITIAL EVALUATION    Patient Name: Radha Abbott  MRN: [id number]  Patient Class: IP- Inpatient  Admission Date: 3/24/2024  Attending Physician: Sajan Hurley, *      HPI:   Radha Abbott is a 63 y.o. male with no known psychiatric history & past pertinent medical history of CAD, CABG, CHF, diabetes, ICD, LVAD and previous MI presents to the ED/admitted to the hospital for Acute on chronic combined systolic and diastolic CHF, NYHA class 4    Psychiatry consulted for: Depression    This is a 62y/o male  who reports feelings of depression due to recent decompensation in his medical condition and associated impacts on his life.    Patient was seen in clinic on Thursday 03/21/24 due to being fluid overloaded. 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 He states that this is the most significant stressor affecting his mood at this time, in addition to his readmission to the hospital. Patient endorses feelings of hopelessness secondary to changes in his life course following current hospitalization and his future employment. 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 Patient anticipates difficulty in transitioning to a non-driving job. Denies anhedonia, changes in concentration or energy or sleep. Patient has no issues initiating sleep, but reports awakening once overnight with the urgency to urinate. He reports that the fluid retention has made him bloated, which has secondarily decreased his appetite. Since his current admission and diuresis, he reports that his appetite has improved - currently eating breakfast during interview. At this time, patient reports that his mood is at a 5/10 (0=worst ; 10=best).    Patient had an LVAD placed on 12/1/23 following which he had a lengthy post-op course, including rehab for further  "PT/OT/ST. During that hospital stay, patient was started on Effexor 37.5mg qd and Desyrel 50mg qd. When asked why those medications were started, patient explains that his wife had left his room for a prolonged period of time, prompting some frustration and anger in him. Upon her return to the patient's room, they had a conversation during which he mentioned that he was "ready to die." The conversation was overheard by the nurse and shared with the team, which prompted CL consult, psychology consults and psychiatric medication trials. At that time, he reports that he was not ready to talk about his condition which led him to feel that the psychology team was "pushy." Patient has been compliant with his Effexor and reports no side effects, but states that the pharmacy was out of stock of the Desyrel and therefore was never started. Denies nausea, vomiting, abdominal pain. Denies CP, SOB at this time. Denies SI, HI, AH, VH.    Medical Review of Systems:  Pertinent items are noted in HPI.    Psychiatric Review of Systems (is patient experiencing or having changes in):  sleep: no  appetite: yes, secondary to fluid retention. Improving with diuresis.  weight: no  energy/anergy: no  interest/pleasure/anhedonia: no  somatic symptoms: no  libido: no  anxiety/panic: no  guilty/hopelessness: yes  concentration: no  Airam:no  Psychosis: no  Trauma: no  S.I.B.s/risky behavior: no    Past Psychiatric History:  Previous Medication Trials: yes - Effexor 37.5mg q.d; Desyrel 50mg - never filled.   Previous Psychiatric Hospitalizations:no   Previous Suicide Attempts: no  History of Violence: no  Outpatient Psychiatrist: no  Family Psychiatric History: no    Substance Abuse History (with emphasis over the last 12 months):  Recreational Drugs:  denies  Use of Alcohol: minimal use  Tobacco Use: former smoker. Quit s/p MI in September 2023.  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 See HPI.  :no  Education: high school diploma/GED  Special Ed: no  Housing Status: with spouse  Lives in [place]  Access to gun: Yes - "7 or 8 of them" - Locked up.  Psychosocial Stressors: health and occupational  Functioning Relationships: good support system, good relationship with spouse or significant other, and good relationship with children  Hobby: sitting on his porch, fishing with his nephew. Denies anhedonia, but expresses concern about the impact of his health on his future leisure time.    Legal History:  Past Charges/Incarcerations: no  Pending charges:no    Mental Status Exam:  General Appearance: appears stated age, well developed and nourished, adequately groomed and appropriately dressed, in no acute distress  Behavior: normal; cooperative; reasonably friendly, pleasant, and polite; appropriate eye-contact; under good behavioral control  Involuntary Movements and Motor Activity: no abnormal involuntary movements noted; no tics, no tremors, no akathisia, no dystonia, no evidence of tardive dyskinesia; no psychomotor agitation or retardation  Gait and Station: intact, normal gait and station, ambulates without assistance  Speech and Language: intact; normal rate, rhythm, volume, tone, and pitch; conversational, spontaneous, and coherent; speaks and understands English proficiently and fluently; repeats words and phrases, no word finding difficulties are noted  Mood: "decent"  Affect: normal, euthymic, reactive, full-range, mood-congruent, appropriate to situation and context  Thought Process and Associations: intact; linear, goal-directed, organized, and logical; no loosening of associations noted  Thought Content and Perceptions:: no suicidal or homicidal ideation, no auditory or visual hallucinations, no paranoid ideation, no ideas of reference, no evidence of delusions " or psychosis  Sensorium and Orientation: intact; alert with clear sensorium; oriented fully to person, place, time and situation  Recent and Remote Memory: grossly intact, able to recall relevant and salient information from the recent and remote past  Attention and Concentration: grossly intact, attentive to the conversation and not readily distractible  Fund of Knowledge: grossly intact, used appropriate vocabulary and demonstrated an awareness of current events, consistent with educational level achieved  Insight: intact, demonstrates awareness of illness and situation  Judgment: intact, behavior is adequate/appropriate to the circumstances, compliant with health provider's recommendations and instructions    CAM ICU positive? {YES/NO/WILD CARDS:81697}      ASSESSMENT & RECOMMENDATIONS   (Please list each relevant SPECIFIC psychiatric DSMV or medical diagnosis and recs for it under the listing DO NOT WRITE AN IMPRESSION PARAGRAPH!!)    MDD mild/moderate/severe, BIPOLAR I/II, Unspecified mood etc  PSYCH MEDICATIONS  Scheduled  PRN  OR doesn't warrant any med management in the inpatient setting defer to outpatient    KHUSHI/panic d/o, adjustment d/o etc  PSYCH MEDICATIONS  Scheduled  PRN  OR doesn't warrant any med management in the inpatient setting defer to outpatient    Schizophrenia, schizoaffective, delusional d/o, acute psychosis etc  PSYCH MEDICATIONS  Scheduled  PRN  OR doesn't warrant any med management in the inpatient setting defer to outpatient    Dementia, parkinson's, pseudo dementia etc  PSYCH MEDICATIONS  Scheduled  PRN  OR doesn't warrant any med management in the inpatient setting defer to outpatient    DELIRIUM  DELIRIUM BEHAVIOR MANAGEMENT  PLEASE utilize CHEMICAL restraints with PRN meds first for agitation. Minimize use of PHYSICAL restraints OR have periods of being out of physical restraints if possible.  Keep window shades open and room lit during day and room dim at night in order to promote  normal sleep-wake cycles  Encourage family at bedside. [place] patient often to situation, location, date.  Continue to Limit or Discontinue use of Narcotics, Benzos and Anti-cholinergic medications as they may worsen delirium.  Continue medical workup for causative etiology of Delirium.     OTHER PERTINENT DIAGNOSIS    RISK ASSESSMENT  NEEDS PEC because patient is in imminent danger of hurting self or others and is gravely disabled. & NEEDS 1:1 sitter  NO NEED FOR PEC patient NOT in any imminent danger of hurting self or others and not gravely disabled.     FOLLOW UP  Will follow up while in house  Will sign off. Patient can follow up with ***/outpatient mental health provider. Resources provided in patient's discharge instructions.    DISPOSITION - once medically cleared:   Seek involuntary inpatient psychiatric admission for stabilization of acute psychiatric symptoms and a safe disposition plan is enacted. The patient &/or their family was informed that the patient will be transferred to an inpatient unit per ED/primary placement team.   OR  Patient may be discharged home with next of kin with outpatient psychaitric follow up/rehab.   OR  Defer to medical team    Please contact ON CALL psychiatry service (24/7) for any acute issues that may arise.    ***        --------------------------------------------------------------------------------------------------------------------------------------------------------------------------------------------------------------------------------------    CONTINUED HISTORY & OBJECTIVE clinical data & findings reviewed and noted for above decision making    Past Medical/Surgical History:   Past Medical History:   Diagnosis Date    CAD (coronary artery disease)     CHF (congestive heart failure)     Diabetes mellitus     HFrEF (heart failure with reduced ejection fraction)     ICD (implantable cardioverter-defibrillator) in place     MI, old      Past Surgical History:    Procedure Laterality Date    ANGIOPLASTY-VENOUS ARTERY Right 12/1/2023    Procedure: ANGIOPLASTY-VENOUS ARTERY, RIGHT FEMORAL;  Surgeon: Yuri Washington MD;  [facility];  Service: Cardiovascular;  Laterality: Right;    AORTIC VALVULOPLASTY N/A 12/1/2023    Procedure: REPAIR, AORTIC VALVE;  Surgeon: Yuri Washington MD;  [facility];  Service: Cardiovascular;  Laterality: N/A;    CARDIAC SURGERY      CLOSURE N/A 12/1/2023    Procedure: CLOSURE, TEMPORARY;  Surgeon: Yuri Washington MD;  Location: [facility];  Service: Cardiovascular;  Laterality: N/A;    DRAINAGE OF PLEURAL EFFUSION  12/4/2023    Procedure: DRAINAGE, PLEURAL EFFUSION;  Surgeon: Yuri Washington MD;  [facility];  Service: Cardiovascular;;    INSERTION OF GRAFT TO PERICARDIUM  12/4/2023    Procedure: INSERTION, GRAFT, PERICARDIUM;  Surgeon: Yuri Washington MD;  [facility];  Service: Cardiovascular;;    INSERTION OF INTRA-AORTIC BALLOON ASSIST DEVICE Right 11/21/2023    Procedure: INSERTION, INTRA-AORTIC BALLOON PUMP;  Surgeon: Finn Cohn MD;  Location: [facility] CATH LAB;  Service: Cardiology;  Laterality: Right;    LEFT VENTRICULAR ASSIST DEVICE Left 12/1/2023    Procedure: INSERTION-LEFT VENTRICULAR ASSIST DEVICE;  Surgeon: Yuri Washington MD;  [facility];  Service: Cardiovascular;  Laterality: Left;  REDO STERNOTOMY - REDO SAW NEEDED FOR CASE    LYSIS OF ADHESIONS  12/1/2023    Procedure: LYSIS, ADHESIONS;  Surgeon: Yuri Washington MD;  [facility];  Service: Cardiovascular;;    PLACEMENT OF SWAN ROLANDO CATHETER WITH IMAGING GUIDANCE  11/20/2023    Procedure: INSERTION, CATHETER, SWAN-ROLANDO, WITH IMAGING GUIDANCE;  Surgeon: Sajan Hurley MD;  Location: [facility] CATH LAB;  Service: Cardiology;;    REMOVAL OF TUNNELED CENTRAL VENOUS CATHETER (CVC) N/A 3/1/2024    Procedure: REMOVAL, CATHETER, CENTRAL VENOUS, TUNNELED;  Surgeon: Seble Aguilar MD;   Location: [facility] CATH LAB;  Service: Interventional Nephrology;  Laterality: N/A;    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 12/1/2023    Procedure: REPAIR, ANEURYSM, ARTERY, FEMORAL;  Surgeon: Yuri Washington MD;  [facility];  Service: Cardiovascular;  Laterality: Right;  Right Femoral Artery Repair    RIGHT HEART CATHETERIZATION Right 10/10/2023    Procedure: INSERTION, CATHETER, RIGHT HEART;  Surgeon: Bin Gandhi MD;  Location: [facility] CATH LAB;  Service: Cardiology;  Laterality: Right;    RIGHT HEART CATHETERIZATION Right 10/13/2023    Procedure: INSERTION, CATHETER, RIGHT HEART;  Surgeon: Walter Mcintyre MD;  Location: [facility] CATH LAB;  Service: Cardiology;  Laterality: Right;    RIGHT HEART CATHETERIZATION  11/13/2023    RIGHT HEART CATHETERIZATION Right 11/13/2023    Procedure: INSERTION, CATHETER, RIGHT HEART;  Surgeon: Juventino Bermudez Jr., MD;  Location: [facility] CATH LAB;  Service: Cardiology;  Laterality: Right;    RIGHT HEART CATHETERIZATION Right 11/20/2023    Procedure: INSERTION, CATHETER, RIGHT HEART;  Surgeon: Sajan Hurley MD;  Location: [facility] CATH LAB;  Service: Cardiology;  Laterality: Right;    RIGHT HEART CATHETERIZATION Right 1/22/2024    Procedure: INSERTION, CATHETER, RIGHT HEART;  Surgeon: Brayan Ocampo MD;  Location: [facility] CATH LAB;  Service: Cardiology;  Laterality: Right;    STERNAL WOUND CLOSURE N/A 12/4/2023    Procedure: CLOSURE, WOUND, STERNUM;  Surgeon: Yuri Washington MD;  [facility];  Service: Cardiovascular;  Laterality: N/A;    STERNOTOMY N/A 12/1/2023    Procedure: STERNOTOMY, REDO;  Surgeon: Yuri Washington MD;  [facility];  Service: Cardiovascular;  Laterality: N/A;    VALVULOPLASTY, MITRAL VALVE N/A 12/1/2023    Procedure: VALVULOPLASTY, MITRAL VALVE;  Surgeon: Yuri Washington MD;  [facility];  Service: Cardiovascular;  Laterality: N/A;       Current Medications:   Scheduled Meds:    amiodarone  400 mg  Oral Daily    famotidine  20 mg Oral Daily    fluticasone propionate  2 spray Each Nostril Daily    gabapentin  100 mg Oral BID    hydrALAZINE  75 mg Oral Q8H    magnesium oxide  400 mg Oral Daily    magnesium sulfate IVPB  2 g Intravenous Once    potassium chloride  40 mEq Oral BID    sodium bicarbonate  325 mg Oral TID    traZODone  25 mg Oral QHS    venlafaxine  37.5 mg Oral Daily    vitamin D  1,000 Units Oral Daily     PRN Meds: acetaminophen, polyethylene glycol, senna-docusate 8.6-50 mg    Allergies:   Review of patient's allergies indicates:  No Known Allergies    Vitals  Vitals:    03/25/24 0839   BP: (!) 108/0   Pulse: 86   Resp: 18   Temp: 97.6 °F (36.4 °C)       Labs/Imaging/Studies:  Recent Results (from the past 24 hour(s))   CBC auto differential    Collection Time: 03/24/24  1:20 PM   Result Value Ref Range    WBC 5.18 3.90 - 12.70 K/uL    RBC 2.99 (L) 4.60 - 6.20 M/uL    Hemoglobin 8.5 (L) 14.0 - 18.0 g/dL    Hematocrit 27.3 (L) 40.0 - 54.0 %    MCV 91 82 - 98 fL    MCH 28.4 27.0 - 31.0 pg    MCHC 31.1 (L) 32.0 - 36.0 g/dL    RDW 17.2 (H) 11.5 - 14.5 %    Platelets 318 150 - 450 K/uL    MPV 9.4 9.2 - 12.9 fL    Immature Granulocytes 0.4 0.0 - 0.5 %    Gran # (ANC) 4.0 1.8 - 7.7 K/uL    Immature Grans (Abs) 0.02 0.00 - 0.04 K/uL    Lymph # 0.7 (L) 1.0 - 4.8 K/uL    Mono # 0.3 0.3 - 1.0 K/uL    Eos # 0.1 0.0 - 0.5 K/uL    Baso # 0.04 0.00 - 0.20 K/uL    nRBC 0 0 /100 WBC    Gran % 77.7 (H) 38.0 - 73.0 %    Lymph % 13.3 (L) 18.0 - 48.0 %    Mono % 6.4 4.0 - 15.0 %    Eosinophil % 1.4 0.0 - 8.0 %    Basophil % 0.8 0.0 - 1.9 %    Differential Method Automated    Comprehensive metabolic panel    Collection Time: 03/24/24  1:20 PM   Result Value Ref Range    Sodium 137 136 - 145 mmol/L    Potassium 4.2 3.5 - 5.1 mmol/L    Chloride 103 95 - 110 mmol/L    CO2 26 23 - 29 mmol/L    Glucose 186 (H) 70 - 110 mg/dL    BUN 58 (H) 8 - 23 mg/dL    Creatinine 3.6 (H) 0.5 - 1.4 mg/dL    Calcium 9.2 8.7 - 10.5 mg/dL     Total Protein 8.2 6.0 - 8.4 g/dL    Albumin 3.4 (L) 3.5 - 5.2 g/dL    Total Bilirubin 0.3 0.1 - 1.0 mg/dL    Alkaline Phosphatase 119 55 - 135 U/L    AST 39 10 - 40 U/L    ALT 58 (H) 10 - 44 U/L    eGFR 18.4 (A) >60 mL/min/1.73 m^2    Anion Gap 8 8 - 16 mmol/L   Troponin I    Collection Time: 03/24/24  1:20 PM   Result Value Ref Range    Troponin I 0.043 (H) 0.000 - 0.026 ng/mL   Brain natriuretic peptide    Collection Time: 03/24/24  1:20 PM   Result Value Ref Range    BNP 1,559 (H) 0 - 99 pg/mL   Protime-INR    Collection Time: 03/24/24  1:20 PM   Result Value Ref Range    Prothrombin Time 36.3 (H) 9.0 - 12.5 sec    INR 3.7 (H) 0.8 - 1.2   Lactic Acid, Plasma    Collection Time: 03/24/24  1:20 PM   Result Value Ref Range    Lactate (Lactic Acid) 1.4 0.5 - 2.2 mmol/L   Magnesium    Collection Time: 03/24/24  1:20 PM   Result Value Ref Range    Magnesium 2.0 1.6 - 2.6 mg/dL   Phosphorus    Collection Time: 03/24/24  1:20 PM   Result Value Ref Range    Phosphorus 3.0 2.7 - 4.5 mg/dL   Urinalysis, Reflex to Urine Culture Urine, Clean Catch    Collection Time: 03/24/24  2:46 PM    Specimen: Urine   Result Value Ref Range    Specimen UA Urine, Clean Catch     Color, UA Colorless (A) Yellow, Straw, Brenda    Appearance, UA Clear Clear    pH, UA 7.0 5.0 - 8.0    Specific Gravity, UA 1.010 1.005 - 1.030    Protein, UA Trace (A) Negative    Glucose, UA Negative Negative    Ketones, UA Negative Negative    Bilirubin (UA) Negative Negative    Occult Blood UA Negative Negative    Nitrite, UA Negative Negative    Leukocytes, UA Negative Negative   Basic metabolic panel    Collection Time: 03/25/24 12:30 AM   Result Value Ref Range    Sodium 138 136 - 145 mmol/L    Potassium 3.5 3.5 - 5.1 mmol/L    Chloride 103 95 - 110 mmol/L    CO2 25 23 - 29 mmol/L    Glucose 172 (H) 70 - 110 mg/dL    BUN 53 (H) 8 - 23 mg/dL    Creatinine 3.5 (H) 0.5 - 1.4 mg/dL    Calcium 9.0 8.7 - 10.5 mg/dL    Anion Gap 10 8 - 16 mmol/L    eGFR 19.0 (A) >60  mL/min/1.73 m^2   Magnesium    Collection Time: 03/25/24 12:30 AM   Result Value Ref Range    Magnesium 1.9 1.6 - 2.6 mg/dL   CBC auto differential    Collection Time: 03/25/24  4:28 AM   Result Value Ref Range    WBC 5.03 3.90 - 12.70 K/uL    RBC 2.84 (L) 4.60 - 6.20 M/uL    Hemoglobin 8.0 (L) 14.0 - 18.0 g/dL    Hematocrit 25.3 (L) 40.0 - 54.0 %    MCV 89 82 - 98 fL    MCH 28.2 27.0 - 31.0 pg    MCHC 31.6 (L) 32.0 - 36.0 g/dL    RDW 17.2 (H) 11.5 - 14.5 %    Platelets 291 150 - 450 K/uL    MPV 9.4 9.2 - 12.9 fL    Immature Granulocytes 0.6 (H) 0.0 - 0.5 %    Gran # (ANC) 3.6 1.8 - 7.7 K/uL    Immature Grans (Abs) 0.03 0.00 - 0.04 K/uL    Lymph # 0.7 (L) 1.0 - 4.8 K/uL    Mono # 0.5 0.3 - 1.0 K/uL    Eos # 0.1 0.0 - 0.5 K/uL    Baso # 0.06 0.00 - 0.20 K/uL    nRBC 0 0 /100 WBC    Gran % 72.4 38.0 - 73.0 %    Lymph % 13.9 (L) 18.0 - 48.0 %    Mono % 9.5 4.0 - 15.0 %    Eosinophil % 2.4 0.0 - 8.0 %    Basophil % 1.2 0.0 - 1.9 %    Differential Method Automated    Basic metabolic panel    Collection Time: 03/25/24  4:28 AM   Result Value Ref Range    Sodium 138 136 - 145 mmol/L    Potassium 3.6 3.5 - 5.1 mmol/L    Chloride 103 95 - 110 mmol/L    CO2 25 23 - 29 mmol/L    Glucose 134 (H) 70 - 110 mg/dL    BUN 56 (H) 8 - 23 mg/dL    Creatinine 3.3 (H) 0.5 - 1.4 mg/dL    Calcium 9.1 8.7 - 10.5 mg/dL    Anion Gap 10 8 - 16 mmol/L    eGFR 20.4 (A) >60 mL/min/1.73 m^2   Magnesium    Collection Time: 03/25/24  4:28 AM   Result Value Ref Range    Magnesium 1.9 1.6 - 2.6 mg/dL   APTT    Collection Time: 03/25/24  4:28 AM   Result Value Ref Range    aPTT 45.1 (H) 21.0 - 32.0 sec   Protime-INR    Collection Time: 03/25/24  4:28 AM   Result Value Ref Range    Prothrombin Time 33.8 (H) 9.0 - 12.5 sec    INR 3.4 (H) 0.8 - 1.2   Brain natriuretic peptide    Collection Time: 03/25/24  4:28 AM   Result Value Ref Range    BNP 1,101 (H) 0 - 99 pg/mL   Prealbumin    Collection Time: 03/25/24  4:28 AM   Result Value Ref Range    Prealbumin  29 20 - 43 mg/dL   Hepatic function panel    Collection Time: 03/25/24  4:28 AM   Result Value Ref Range    Total Protein 7.2 6.0 - 8.4 g/dL    Albumin 3.0 (L) 3.5 - 5.2 g/dL    Total Bilirubin 0.3 0.1 - 1.0 mg/dL    Bilirubin, Direct 0.1 0.1 - 0.3 mg/dL    AST 39 10 - 40 U/L    ALT 55 (H) 10 - 44 U/L    Alkaline Phosphatase 107 55 - 135 U/L   Lactate dehydrogenase    Collection Time: 03/25/24  4:29 AM   Result Value Ref Range     (H) 110 - 260 U/L     Imaging Results              X-Ray Chest AP Portable (Final result)  Result time 03/24/24 14:25:31      Final result by Cate Wilkes MD (03/24/24 14:25:31)                   Impression:      Minimal bibasilar atelectatic changes.    Possible trace of left pleural fluid      Electronically signed by: Cate Wilkes MD  Date:    03/24/2024  Time:    14:25               Narrative:    EXAMINATION:  XR CHEST AP PORTABLE    CLINICAL HISTORY:  edema;    TECHNIQUE:  Single frontal view of the chest was performed.    COMPARISON:  02/01/2024    FINDINGS:  There is a cardio stimulator device in the left upper chest with a defibrillator lead.  LVAD.    Sternotomy wires.    The cardiac silhouette is mildly prominent.    Mild perihilar increased lung markings, unchanged.    Minimal atelectasis at the bilateral lung base, left more than right, and possible small left pleural effusion.    The osseous structures appear within normal limits.  No pneumothorax.                                    Terrell Romero, MS3

## 2024-03-25 NOTE — PROGRESS NOTES
Admit Note     Heart Transplant  spoke with the pt's spouse  to assess patients needs due to pt request. Pt and spouse are familiar with the role of the Transplant  from past hospital visit.    Patient is a 63 y.o.  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  Pt received his LVAD on 12/1/23, was discharged to rehab and returned home 2/1/24.     Patient admitted to [facility]  on 3/24/2024 .  At this time, patient presents per spouse as alert and oriented x 4.  At this time, patients caregiver presents as alert and oriented x 4, calm, communicative, and asking and answering questions appropriately.      Household/Family Systems (as reported by patients caregiver)     Patient resides with patient's spouse, at     [id number] [facility] [address].      Support system includes spouse, siblings and adult children.    Patient does not have dependents that are need of being cared for.  Pt has been  since 2020. The pt has three adult children and the pt's spouse has one child. The pt has 3 sisters and 2 bothers that live near the pt.      Patients primary caregiver is self and spouse.     Pt's cell: [phone]    Emergency contact:   Arlyn Abbott (spouse, works full time, but has FMLA and drives) [phone]  Teetee Zhou (sister, unemployed and drives) [phone], [phone]  .    During admission, patient's caregiver plans to stay in patient's room.  Confirmed patient and patients caregivers do have access to reliable transportation.  Spouse reports she and the pt are driving.     Cognitive Status/Learning     Patients caregiver reports patients reading ability as 12th grade and states patient does not have difficulty with memory, reading, writing, seeing, hearing, comprehension, and learning.    Patients caregiver reports patient learns best by one on one support.      Needed: No.   Highest education level: High School (9-12) or GED    Vocation/Disability (as reported by patients caregiver)    Working for Income: No  If no, reason not working: Disability  Patient was a , but started receiving disability in 2009 due to his heart.    SS disability: 500 a month   The pt's spouse is employed full time at [employer] as a patient access rep.   Pt's spouse reports some difficulty covering the cost of household expenses including electricity.  Spouse reports family is assisting, but she does not know for how long.   Pt's spouse asked about food stamps and worker encouraged the pt to apply.   Worker explained benefits of fundraising.   Spouse reports she has not been able to locate electrical support programs in her area.     Adherence     Patients caregiver reports patient has a high level of adherence to patients health care regimen.  Adherence counseling and education provided.  Patient's caregiver verbalizes understanding.    Substance Use    Patients caregiver reports patients substance usage as the following:    Tobacco: no current use. Pt and spouse report no tobacco use since September 2023.  Alcohol: none, patient denies any use.  Illicit Drugs/Non-prescribed Medications: none, patient denies any use.  Patients caregiver states clear understanding of the potential impact of substance use.  Substance abstinence/cessation counseling, education and resources provided and reviewed.     Services Utilizing/ADLS (as reported by patients caregiver)    Infusion Service: Prior to admission, patient utilizing? no spouse reports [facility] can be used again if needed.  Home Health: Prior to admission, patient utilizing? no Spouse reports [facility] can be used again if needed.   DME: Prior to admission, [facility] and [facility].  Pulmonary/Cardiac Rehab: Prior to admission, no  Dialysis:  Prior to admission, none per medical record  Transplant Specialty  Pharmacy:  Prior to admission, no.    Prior to admission, patients caregiver reports patient was mostly independent with ADLS and was driving.  Patients caregiver reports patient is not able to care for self at this time due to compromised medical condition (as documented in medical record) and physical weakness..  Patients caregiver reports patient indicates a willingness to care for self once medically cleared to do so.    Insurance/Medications    Insured by   Payer/Plan Subscr  Sex Relation Sub. Ins. ID Effective Group Num   1. MEDICAID - UHALISON BUCK 1960 Male Self [id number]                                   [address] [address]   2. [employer] BU[employer] 1960 Male Self [id number] 10/31/23                                    [address] [address]      Primary Insurance (for UNOS reporting): Public Insurance - Medicaid  Secondary Insurance (for UNOS reporting): None    Patients caregiver reports patient is able to obtain and afford medications at this time and at time of discharge.    Living Will/Healthcare Power of     Patients caregiver reports patient has a LW and/or HCPA.   provided education regarding LW and HCPA and the completion of forms.    Coping/Mental Health (as reported by patients caregiver)    Patient is coping adequately with the aid of  family members.   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 Spouse reports the pt then started to feel depressed. Pt/spouse asked to see psychiatry and spouse reports they were seen today. Spouse reports the  Pt will be taking Trazodone and Effexor.   Worker explained the purpose of vocational rehab and the pt reports he's not interested in same.   Worker explained the benefits out pt counseling and the pt reports  he's not interested in same.   Pt and spouse seem to disagree on the level of the pt's frustration.    There are concerns about household income and employment.    Worker provided active listening, encouragement and attempted to offer resources.    Transplant Social Workers will continue to follow.         Discharge Planning (as reported by patients caregiver)    At time of discharge, patient plans to return to patient's home under the care of self and spouse.  Patients spouse will transport patient.  Per rounds today, expected discharge date has not been medically determined at this time. Patients caretaker verbalizes understanding and is involved in treatment planning and discharge process.    Additional Concerns    Patient is being followed for needs, education, resources, information, emotional support, supportive counseling, and for supportive and skilled discharge plan of care.  providing ongoing psychosocial support, education, resources and d/c planning as needed.  SW remains available. Patient's caregiver verbalizes understanding and agreement with information reviewed,  availability and how to access available resources as needed.

## 2024-03-25 NOTE — PROGRESS NOTES
03/25/2024  [place] Duarte    Current provider:  Sajan Hurley, *    Device interrogation:      3/25/2024     4:27 AM 3/24/2024    11:21 PM 3/24/2024     7:49 PM 3/24/2024     1:53 PM 3/21/2024     7:29 AM 3/1/2024     6:10 AM 2/29/2024     3:01 PM   TXP LVAD INTERROGATIONS   Type HeartMate3 HeartMate3 HeartMate3 HeartMate3  HeartMate3    Flow 3.7 3.6 3.9 3.6  3.7    Speed 5050 5000 5000 5000  5000    PI 5.7 6.2 4.4 7.2  6.6    Power (Carrington) 3.4 3.4 3.5 3.4  3.5    LSL   4600       Pulsatility Pulse Pulse Pulse Pulse No Pulse No Pulse No Pulse          [facility] to ensure all mechanical assist device settings (IABP or VAD) were appropriate and all parameters were within limits.  I was able to ensure all back up equipment was present, the staff had no issues, and the Perfusion Department daily rounding was complete.      For implantable VADs: Interrogation of Ventricular assist device was performed with analysis of device parameters and review of device function. I have personally reviewed the interrogation findings and agree with findings as stated.     In emergency, the nursing units have been notified to contact the perfusion department either by:  Calling [phone] from 630am to 4pm Mon thru Fri, utilizing the On-Call Finder functionality of Epic and searching for Perfusion, or by contacting the hospital  from 4pm to 630am and on weekends and asking to speak with the perfusionist on call.    7:35 AM

## 2024-03-25 NOTE — TELEPHONE ENCOUNTER
[facility] advance directive received and faxed to [facility] information [facility]    Scanned into patient  chart.

## 2024-03-25 NOTE — ASSESSMENT & PLAN NOTE
Patient hypervolemic on exam today. Rebolus lasix and increase lasix gtt to 20.  Home diuretic torsemide 80 BID  GDMT limited to hydralazine due to CKD  1500cc/day fluid restriction  Replace K and mag

## 2024-03-26 ENCOUNTER — TELEPHONE (OUTPATIENT)
Dept: HEMATOLOGY/ONCOLOGY | Facility: CLINIC | Age: 64
End: 2024-03-26
Payer: MEDICAID

## 2024-03-26 LAB
ALBUMIN SERPL BCP-MCNC: 3.2 G/DL (ref 3.5–5.2)
ALP SERPL-CCNC: 113 U/L (ref 55–135)
ALT SERPL W/O P-5'-P-CCNC: 53 U/L (ref 10–44)
ANION GAP SERPL CALC-SCNC: 10 MMOL/L (ref 8–16)
ASCENDING AORTA: 3.27 CM
AST SERPL-CCNC: 35 U/L (ref 10–40)
BASOPHILS # BLD AUTO: 0.03 K/UL (ref 0–0.2)
BASOPHILS NFR BLD: 0.6 % (ref 0–1.9)
BILIRUB SERPL-MCNC: 0.3 MG/DL (ref 0.1–1)
BSA FOR ECHO PROCEDURE: 1.91 M2
BUN SERPL-MCNC: 51 MG/DL (ref 8–23)
CALCIUM SERPL-MCNC: 9 MG/DL (ref 8.7–10.5)
CHLORIDE SERPL-SCNC: 105 MMOL/L (ref 95–110)
CO2 SERPL-SCNC: 24 MMOL/L (ref 23–29)
CREAT SERPL-MCNC: 3.4 MG/DL (ref 0.5–1.4)
CV ECHO LV RWT: 0.2 CM
DIFFERENTIAL METHOD BLD: ABNORMAL
DOP CALC LVOT AREA: 3.9 CM2
DOP CALC LVOT DIAMETER: 2.22 CM
E WAVE DECELERATION TIME: 100.7 MSEC
E/A RATIO: 1.76
E/E' RATIO: 17.85 M/S
ECHO LV POSTERIOR WALL: 0.68 CM (ref 0.6–1.1)
EOSINOPHIL # BLD AUTO: 0.1 K/UL (ref 0–0.5)
EOSINOPHIL NFR BLD: 2.2 % (ref 0–8)
ERYTHROCYTE [DISTWIDTH] IN BLOOD BY AUTOMATED COUNT: 17.2 % (ref 11.5–14.5)
EST. GFR  (NO RACE VARIABLE): 19.7 ML/MIN/1.73 M^2
FRACTIONAL SHORTENING: 4 % (ref 28–44)
GLUCOSE SERPL-MCNC: 131 MG/DL (ref 70–110)
HCT VFR BLD AUTO: 26.1 % (ref 40–54)
HGB BLD-MCNC: 8.4 G/DL (ref 14–18)
IMM GRANULOCYTES # BLD AUTO: 0.02 K/UL (ref 0–0.04)
IMM GRANULOCYTES NFR BLD AUTO: 0.4 % (ref 0–0.5)
INR PPP: 3.2 (ref 0.8–1.2)
INTERVENTRICULAR SEPTUM: 0.46 CM (ref 0.6–1.1)
LA MAJOR: 6.75 CM
LA MINOR: 5.99 CM
LA WIDTH: 5.74 CM
LDH SERPL L TO P-CCNC: 283 U/L (ref 110–260)
LEFT ATRIUM SIZE: 3.92 CM
LEFT ATRIUM VOLUME INDEX MOD: 61.1 ML/M2
LEFT ATRIUM VOLUME INDEX: 62.6 ML/M2
LEFT ATRIUM VOLUME MOD: 118.52 CM3
LEFT ATRIUM VOLUME: 121.4 CM3
LEFT INTERNAL DIMENSION IN SYSTOLE: 6.53 CM (ref 2.1–4)
LEFT VENTRICLE DIASTOLIC VOLUME INDEX: 122.23 ML/M2
LEFT VENTRICLE DIASTOLIC VOLUME: 237.12 ML
LEFT VENTRICLE MASS INDEX: 79 G/M2
LEFT VENTRICLE SYSTOLIC VOLUME INDEX: 112.6 ML/M2
LEFT VENTRICLE SYSTOLIC VOLUME: 218.4 ML
LEFT VENTRICULAR INTERNAL DIMENSION IN DIASTOLE: 6.77 CM (ref 3.5–6)
LEFT VENTRICULAR MASS: 154.21 G
LV LATERAL E/E' RATIO: 14.5 M/S
LV SEPTAL E/E' RATIO: 23.2 M/S
LVAD BASE RATE: 5000 RPM
LYMPHOCYTES # BLD AUTO: 0.8 K/UL (ref 1–4.8)
LYMPHOCYTES NFR BLD: 17.1 % (ref 18–48)
MAGNESIUM SERPL-MCNC: 2.3 MG/DL (ref 1.6–2.6)
MCH RBC QN AUTO: 29 PG (ref 27–31)
MCHC RBC AUTO-ENTMCNC: 32.2 G/DL (ref 32–36)
MCV RBC AUTO: 90 FL (ref 82–98)
MONOCYTES # BLD AUTO: 0.5 K/UL (ref 0.3–1)
MONOCYTES NFR BLD: 9.2 % (ref 4–15)
MV PEAK A VEL: 0.66 M/S
MV PEAK E VEL: 1.16 M/S
MV STENOSIS PRESSURE HALF TIME: 29.2 MS
MV VALVE AREA P 1/2 METHOD: 7.53 CM2
NEUTROPHILS # BLD AUTO: 3.5 K/UL (ref 1.8–7.7)
NEUTROPHILS NFR BLD: 70.5 % (ref 38–73)
NRBC BLD-RTO: 0 /100 WBC
PLATELET # BLD AUTO: 336 K/UL (ref 150–450)
PMV BLD AUTO: 9.8 FL (ref 9.2–12.9)
POTASSIUM SERPL-SCNC: 4.7 MMOL/L (ref 3.5–5.1)
PROT SERPL-MCNC: 7 G/DL (ref 6–8.4)
PROTHROMBIN TIME: 32 SEC (ref 9–12.5)
RA MAJOR: 5.05 CM
RA PRESSURE ESTIMATED: 3 MMHG
RA WIDTH: 4.01 CM
RBC # BLD AUTO: 2.9 M/UL (ref 4.6–6.2)
RIGHT VENTRICULAR END-DIASTOLIC DIMENSION: 4.5 CM
SINUS: 2.73 CM
SODIUM SERPL-SCNC: 139 MMOL/L (ref 136–145)
STJ: 2.62 CM
TDI LATERAL: 0.08 M/S
TDI SEPTAL: 0.05 M/S
TDI: 0.07 M/S
TRICUSPID ANNULAR PLANE SYSTOLIC EXCURSION: 1.94 CM
WBC # BLD AUTO: 4.91 K/UL (ref 3.9–12.7)
Z-SCORE OF LEFT VENTRICULAR DIMENSION IN END DIASTOLE: 2.06
Z-SCORE OF LEFT VENTRICULAR DIMENSION IN END SYSTOLE: 5.18

## 2024-03-26 PROCEDURE — 83615 LACTATE (LD) (LDH) ENZYME: CPT | Mod: NTX | Performed by: PHYSICIAN ASSISTANT

## 2024-03-26 PROCEDURE — 83735 ASSAY OF MAGNESIUM: CPT | Mod: NTX | Performed by: PHYSICIAN ASSISTANT

## 2024-03-26 PROCEDURE — 63600175 PHARM REV CODE 636 W HCPCS: Mod: NTX | Performed by: STUDENT IN AN ORGANIZED HEALTH CARE EDUCATION/TRAINING PROGRAM

## 2024-03-26 PROCEDURE — 80053 COMPREHEN METABOLIC PANEL: CPT | Mod: NTX | Performed by: STUDENT IN AN ORGANIZED HEALTH CARE EDUCATION/TRAINING PROGRAM

## 2024-03-26 PROCEDURE — 85025 COMPLETE CBC W/AUTO DIFF WBC: CPT | Mod: NTX | Performed by: PHYSICIAN ASSISTANT

## 2024-03-26 PROCEDURE — 20600001 HC STEP DOWN PRIVATE ROOM: Mod: NTX

## 2024-03-26 PROCEDURE — 36415 COLL VENOUS BLD VENIPUNCTURE: CPT | Mod: NTX | Performed by: STUDENT IN AN ORGANIZED HEALTH CARE EDUCATION/TRAINING PROGRAM

## 2024-03-26 PROCEDURE — 27000248 HC VAD-ADDITIONAL DAY: Mod: NTX

## 2024-03-26 PROCEDURE — 93750 INTERROGATION VAD IN PERSON: CPT | Mod: NTX,,, | Performed by: INTERNAL MEDICINE

## 2024-03-26 PROCEDURE — 25000003 PHARM REV CODE 250: Mod: NTX | Performed by: PHYSICIAN ASSISTANT

## 2024-03-26 PROCEDURE — 85610 PROTHROMBIN TIME: CPT | Mod: NTX | Performed by: PHYSICIAN ASSISTANT

## 2024-03-26 PROCEDURE — 99233 SBSQ HOSP IP/OBS HIGH 50: CPT | Mod: NTX,,, | Performed by: INTERNAL MEDICINE

## 2024-03-26 PROCEDURE — 25000003 PHARM REV CODE 250: Mod: NTX | Performed by: STUDENT IN AN ORGANIZED HEALTH CARE EDUCATION/TRAINING PROGRAM

## 2024-03-26 PROCEDURE — 25000003 PHARM REV CODE 250: Mod: NTX | Performed by: INTERNAL MEDICINE

## 2024-03-26 RX ORDER — BUMETANIDE 1 MG/1
4 TABLET ORAL 2 TIMES DAILY
Status: DISCONTINUED | OUTPATIENT
Start: 2024-03-26 | End: 2024-03-27 | Stop reason: HOSPADM

## 2024-03-26 RX ADMIN — Medication 400 MG: at 08:03

## 2024-03-26 RX ADMIN — POTASSIUM CHLORIDE 40 MEQ: 1500 TABLET, EXTENDED RELEASE ORAL at 08:03

## 2024-03-26 RX ADMIN — VENLAFAXINE 37.5 MG: 37.5 TABLET ORAL at 08:03

## 2024-03-26 RX ADMIN — CHOLECALCIFEROL TAB 25 MCG (1000 UNIT) 1000 UNITS: 25 TAB at 08:03

## 2024-03-26 RX ADMIN — BUMETANIDE 4 MG: 1 TABLET ORAL at 11:03

## 2024-03-26 RX ADMIN — GABAPENTIN 100 MG: 100 CAPSULE ORAL at 08:03

## 2024-03-26 RX ADMIN — SODIUM CHLORIDE 20 MG/HR: 9 INJECTION, SOLUTION INTRAVENOUS at 06:03

## 2024-03-26 RX ADMIN — SODIUM BICARBONATE 325 MG: 325 TABLET ORAL at 08:03

## 2024-03-26 RX ADMIN — TRAZODONE HYDROCHLORIDE 25 MG: 50 TABLET ORAL at 08:03

## 2024-03-26 RX ADMIN — HYDRALAZINE HYDROCHLORIDE 75 MG: 50 TABLET ORAL at 01:03

## 2024-03-26 RX ADMIN — WARFARIN SODIUM 1.5 MG: 1 TABLET ORAL at 04:03

## 2024-03-26 RX ADMIN — SODIUM BICARBONATE 325 MG: 325 TABLET ORAL at 04:03

## 2024-03-26 RX ADMIN — HYDRALAZINE HYDROCHLORIDE 75 MG: 50 TABLET ORAL at 06:03

## 2024-03-26 RX ADMIN — AMIODARONE HYDROCHLORIDE 400 MG: 200 TABLET ORAL at 08:03

## 2024-03-26 RX ADMIN — HYDRALAZINE HYDROCHLORIDE 75 MG: 50 TABLET ORAL at 08:03

## 2024-03-26 RX ADMIN — BUMETANIDE 4 MG: 1 TABLET ORAL at 04:03

## 2024-03-26 RX ADMIN — FAMOTIDINE 20 MG: 20 TABLET, FILM COATED ORAL at 08:03

## 2024-03-26 NOTE — CONSULTS
[facility] - Cardiology Stepdown  Adult Nutrition  Consult Note    SUMMARY     Recommendations  1. Continue Regular diet   2. Add Boost Glucose Control BID for optimization of protein/calorie intake   3. RD follow-up    Goals: Meet % of EEN/EPN by RD f/u date  Nutrition Goal Status: goal not met  Communication of RD Recs: POC    Assessment and Plan    Endocrine  Moderate protein-calorie malnutrition  Malnutrition Type:  Context: chronic illness  Level: moderate    Related to (etiology):   Decreased ability to consume sufficient energy     Signs and Symptoms (as evidenced by):   Malnutrition Characteristic Summary:  Energy Intake (Malnutrition): less than 75% for greater than or equal to 1 month  Muscle Mass (Malnutrition): moderate depletion    Interventions/Recommendations (treatment strategy):  1. Continue Regular diet   2. Add Boost Glucose Control BID for optimization of protein/calorie intake   3. RD follow-up    Nutrition Diagnosis Status:   New          Malnutrition Assessment  Malnutrition Context: chronic illness  Malnutrition Level: moderate          Energy Intake (Malnutrition): less than 75% for greater than or equal to 1 month  Muscle Mass (Malnutrition): moderate depletion   Orbital Region (Subcutaneous Fat Loss): moderate depletion  Upper Arm Region (Subcutaneous Fat Loss): moderate depletion   [place] Region (Muscle Loss): moderate depletion  Patellar Region (Muscle Loss): moderate depletion  Anterior Thigh Region (Muscle Loss): moderate depletion        Reason for Assessment    Reason For Assessment: consult  Diagnosis: cardiac disease  Relevant Medical History: DM, CAD  Interdisciplinary Rounds: attended  General Information Comments: RD consulted for nutritional assessment. Pt consuming 0-50% meal consumption. No n/v. Pt is currently on a regular diet. Boost ordered for pt. NFPE complete 3/26/24  meets criteria for moderate calorie malnutrition in the context of chronic illness per ASPEN  "guidelines. LBM noted-3/24/24  Nutrition Discharge Planning: pending clinical course    Nutrition Risk Screen    Nutrition Risk Screen: no indicators present    Nutrition/Diet History    Spiritual, Cultural Beliefs, Amish Practices, Values that Affect Care: no    Anthropometrics    Temp: 97.8 °F (36.6 °C)  Height Method: Stated  Height: 6' 2" (188 cm)  Height (inches): 74 in  Weight Method: Standard Scale  Weight: 69.9 kg (154 lb)  Weight (lb): 154 lb  Ideal Body Weight (IBW), Male: 190 lb  % Ideal Body Weight, Male (lb): 81.05 %  BMI (Calculated): 19.8       Lab/Procedures/Meds    Pertinent Labs Reviewed: reviewed  Pertinent Labs Comments: GFR 19.7, BUN 51  Pertinent Medications Reviewed: reviewed  Pertinent Medications Comments: Vitamin D    Estimated/Assessed Needs    Weight Used For Calorie Calculations: 69.9 kg (154 lb 1.6 oz)  Energy Calorie Requirements (kcal): 1966  Energy Need Method: Whiteside-St Jeor (PAL 1.25)  Protein Requirements: 105 g (1.5 g/kg)  Weight Used For Protein Calculations: 69.9 kg (154 lb 1.6 oz)   RDA Method (mL): 1966     Nutrition Prescription Ordered    Current Diet Order: Regular diet    Evaluation of Received Nutrient/Fluid Intake    I/O: -  Energy Calories Required: not meeting needs  Protein Required: not meeting needs  Fluid Required: not meeting needs  Total Fluid Intake (mL/kg): 1 ml or fluid per MD  Tolerance: tolerating  % Intake of Estimated Energy Needs: 0 - 25 %  % Meal Intake: 0 - 25 %    Nutrition Risk    Level of Risk/Frequency of Follow-up: low (1-2x/week)    Monitor and Evaluation    Food and Nutrient Intake: energy intake, food and beverage intake  Food and Nutrient Adminstration: diet order  Knowledge/Beliefs/Attitudes: food and nutrition knowledge/skill, beliefs and attitudes  Physical Activity and Function: nutrition-related ADLs and IADLs, factors affecting access to physical activity  Anthropometric Measurements: height/length, weight, weight change, body mass " index, growth pattern indices/percentile ranks  Biochemical Data, Medical Tests and Procedures: electrolyte and renal panel, gastrointestinal profile, glucose/endocrine profile, inflammatory profile, lipid profile  Nutrition-Focused Physical Findings: overall appearance, extremities, muscles and bones, head and eyes, skin       Nutrition Follow-Up    RD Follow-up?: Yes

## 2024-03-26 NOTE — PLAN OF CARE
Pt free of falls and injury. Pt AAOx4 with VSS. Fall precautions remain in place. Pt remains on room air. Sats 96-99%. NSR on telemetry with LVAD artifact. LVAD hum present and smooth. VAD numbers and dopplers WDL. DLES dressing CDI. Plan of care reviewed with pt. Lasix gtt continued as ordered. Intake and output monitored. Pt resting comfortably with no complaints of pain. Pt denies chest pain, headache, and SOB. No acute distress noted,  plan of care continues.      Problem: Adult Inpatient Plan of Care  Goal: Plan of Care Review  Outcome: Ongoing, Progressing  Goal: Patient-Specific Goal (Individualized)  Outcome: Ongoing, Progressing  Goal: Absence of Hospital-Acquired Illness or Injury  Outcome: Ongoing, Progressing  Goal: Optimal Comfort and Wellbeing  Outcome: Ongoing, Progressing  Goal: Readiness for Transition of Care  Outcome: Ongoing, Progressing     Problem: Diabetes Comorbidity  Goal: Blood Glucose Level Within Targeted Range  Outcome: Ongoing, Progressing     Problem: Impaired Wound Healing  Goal: Optimal Wound Healing  Outcome: Ongoing, Progressing     Problem: Fall Injury Risk  Goal: Absence of Fall and Fall-Related Injury  Outcome: Ongoing, Progressing     Problem: Hemodynamic Instability (Ventricular Assist Device)  Goal: Optimal Blood Flow  Outcome: Ongoing, Progressing     Problem: Infection (Ventricular Assist Device)  Goal: Absence of Infection Signs and Symptoms  Outcome: Ongoing, Progressing     Problem: Right-Sided Heart Compromise (Ventricular Assist Device)  Goal: Effective Right-Sided Heart Function  Outcome: Ongoing, Progressing     Problem: Adjustment to Illness (Heart Failure)  Goal: Optimal Coping  Outcome: Ongoing, Progressing     Problem: Cardiac Output Decreased (Heart Failure)  Goal: Optimal Cardiac Output  Outcome: Ongoing, Progressing     Problem: Fluid Imbalance (Heart Failure)  Goal: Fluid Balance  Outcome: Ongoing, Progressing     Problem: Functional Ability Impaired (Heart  Failure)  Goal: Optimal Functional Ability  Outcome: Ongoing, Progressing     Problem: Oral Intake Inadequate (Heart Failure)  Goal: Optimal Nutrition Intake  Outcome: Ongoing, Progressing     Problem: Respiratory Compromise (Heart Failure)  Goal: Effective Oxygenation and Ventilation  Outcome: Ongoing, Progressing     Problem: Sleep Disordered Breathing (Heart Failure)  Goal: Effective Breathing Pattern During Sleep  Outcome: Ongoing, Progressing

## 2024-03-26 NOTE — PROGRESS NOTES
03/26/2024  [place] Duarte    Current provider:  Sajan Hurley, *    Device interrogation:      3/26/2024    11:26 AM 3/26/2024     7:46 AM 3/26/2024     3:43 AM 3/26/2024    12:23 AM 3/25/2024     8:11 PM 3/25/2024     4:00 PM 3/25/2024    11:28 AM   TXP LVAD INTERROGATIONS   Type HeartMate3 HeartMate3 HeartMate3 HeartMate3 HeartMate3 HeartMate3 HeartMate3   Flow 4 3.8 3.7 4.1 4.1 3.6 3.8   Speed 5000 5000 5000 5000 5000 5000 5000   PI 4.9 5.5 6.7 4.5 4.1 6.2 5.6   Power (Carrington) 3.5 3.4 3.4 3.4 3.4 3.3 3.4   LSL   4600 4600 4600     Pulsatility   Intermittent pulse Intermittent pulse Intermittent pulse            Rounded on [facility] to ensure all mechanical assist device settings (IABP or VAD) were appropriate and all parameters were within limits.  I was able to ensure all back up equipment was present, the staff had no issues, and the Perfusion Department daily rounding was complete.      For implantable VADs: Interrogation of Ventricular assist device was performed with analysis of device parameters and review of device function. I have personally reviewed the interrogation findings and agree with findings as stated.     In emergency, the nursing units have been notified to contact the perfusion department either by:  Calling [phone] from 630am to 4pm Mon thru Fri, utilizing the On-Call Finder functionality of Epic and searching for Perfusion, or by contacting the hospital  from 4pm to 630am and on weekends and asking to speak with the perfusionist on call.    2:48 PM

## 2024-03-26 NOTE — ASSESSMENT & PLAN NOTE
Malnutrition Type:  Context: chronic illness  Level: moderate    Related to (etiology):   Decreased ability to consume sufficient energy     Signs and Symptoms (as evidenced by):   Malnutrition Characteristic Summary:  Energy Intake (Malnutrition): less than 75% for greater than or equal to 1 month  Muscle Mass (Malnutrition): moderate depletion    Interventions/Recommendations (treatment strategy):  1. Continue Regular diet   2. Add Boost Glucose Control BID for optimization of protein/calorie intake   3. RD follow-up    Nutrition Diagnosis Status:   New

## 2024-03-26 NOTE — TELEPHONE ENCOUNTER
Spoke to patient in reference to Hematology referral from Dr. Mendez.  Appointment scheduled per patient's request next available on [facility].  Appointment notice mailed via pt portal.

## 2024-03-26 NOTE — PROGRESS NOTES
Patient was seen today in the hospital. Patient awake at time of visit. Spouse at bedside. Equipment inspected and is in working condition. Equipment care and cleaning reviewed with patient and spouse. Patient has no equipment needs at this time.

## 2024-03-26 NOTE — ASSESSMENT & PLAN NOTE
Patient euvolemic on exam today. Will stop lasix gtt and start bumex 4mg bid.   Home diuretic torsemide 80 BID  GDMT limited to hydralazine due to CKD  1500cc/day fluid restriction  Replace K and mag

## 2024-03-26 NOTE — PLAN OF CARE
Recommendations  1. Continue Regular diet   2. Add Boost Glucose Control BID for optimization of protein/calorie intake   3. RD follow-up     Goals: Meet % of EEN/EPN by RD f/u date  Nutrition Goal Status: goal not met  Communication of RD Recs: POC

## 2024-03-26 NOTE — SUBJECTIVE & OBJECTIVE
Interval History: NAEON. NAEON.     Continuous Infusions:      Scheduled Meds:   amiodarone  400 mg Oral Daily    bumetanide  4 mg Oral BID loop    famotidine  20 mg Oral Daily    fluticasone propionate  2 spray Each Nostril Daily    gabapentin  100 mg Oral BID    hydrALAZINE  75 mg Oral Q8H    magnesium oxide  400 mg Oral Daily    sodium bicarbonate  325 mg Oral TID    traZODone  25 mg Oral QHS    venlafaxine  37.5 mg Oral Daily    vitamin D  1,000 Units Oral Daily    warfarin  1.5 mg Oral Daily     PRN Meds:acetaminophen, polyethylene glycol, senna-docusate 8.6-50 mg    Review of patient's allergies indicates:  No Known Allergies  Objective:     Vital Signs (Most Recent):  Temp: 97.6 °F (36.4 °C) (03/26/24 0745)  Pulse: 63 (03/26/24 1031)  Resp: 18 (03/26/24 0745)  BP: (!) 90/0 (03/26/24 0854)  SpO2: 99 % (03/26/24 0745) Vital Signs (24h Range):  Temp:  [97.2 °F (36.2 °C)-98.3 °F (36.8 °C)] 97.6 °F (36.4 °C)  Pulse:  [] 63  Resp:  [18-19] 18  SpO2:  [96 %-99 %] 99 %  BP: (80-96)/(0) 90/0     Patient Vitals for the past 72 hrs (Last 3 readings):   Weight   03/26/24 1031 69.9 kg (154 lb)   03/26/24 0745 70.3 kg (154 lb 15.7 oz)   03/24/24 1942 36.3 kg (80 lb 0.8 oz)       Body mass index is 19.77 kg/m².      Intake/Output Summary (Last 24 hours) at 3/26/2024 1050  Last data filed at 3/26/2024 0853  Gross per 24 hour   Intake 804 ml   Output 2700 ml   Net -1896 ml         Hemodynamic Parameters:            Physical Exam  Constitutional:       General: He is not in acute distress.     Appearance: Normal appearance.   HENT:      Head: Normocephalic and atraumatic.   Eyes:      Conjunctiva/sclera: Conjunctivae normal.   Neck:      Vascular: JVD present.      Comments: JVP 8cm of H20.   Cardiovascular:      Comments: VAD hum appreciated  Pulmonary:      Breath sounds: Normal breath sounds.      Comments: Clear to auscultation  Abdominal:      Comments: Soft, non-tender, non-distended   Musculoskeletal:       Cervical back: Normal range of motion.      Comments: No peripheral edema   Skin:     General: Skin is warm and dry.      Capillary Refill: Capillary refill takes less than 2 seconds.   Neurological:      Mental Status: He is alert and oriented to person, place, and time.   Psychiatric:         Mood and Affect: Mood and affect normal.            Significant Labs:  CBC:  Recent Labs   Lab 03/24/24  1320 03/25/24  0428 03/26/24  0503   WBC 5.18 5.03 4.91   RBC 2.99* 2.84* 2.90*   HGB 8.5* 8.0* 8.4*   HCT 27.3* 25.3* 26.1*    291 336   MCV 91 89 90   MCH 28.4 28.2 29.0   MCHC 31.1* 31.6* 32.2       BNP:  Recent Labs   Lab 03/21/24  0857 03/24/24  1320 03/25/24  0428   * 1,559* 1,101*       CMP:  Recent Labs   Lab 03/24/24  1320 03/25/24  0030 03/25/24  0428 03/25/24  1538 03/26/24  0503   *   < > 134* 188* 131*   CALCIUM 9.2   < > 9.1 9.4 9.0   ALBUMIN 3.4*  --  3.0*  --  3.2*   PROT 8.2  --  7.2  --  7.0      < > 138 135* 139   K 4.2   < > 3.6 4.1 4.7   CO2 26   < > 25 24 24      < > 103 103 105   BUN 58*   < > 56* 54* 51*   CREATININE 3.6*   < > 3.3* 3.3* 3.4*   ALKPHOS 119  --  107  --  113   ALT 58*  --  55*  --  53*   AST 39  --  39  --  35   BILITOT 0.3  --  0.3  --  0.3    < > = values in this interval not displayed.        Coagulation:   Recent Labs   Lab 03/24/24  1320 03/25/24  0428 03/26/24  0503   INR 3.7* 3.4* 3.2*   APTT  --  45.1*  --        LDH:  Recent Labs   Lab 03/25/24  0429 03/26/24  0503   * 283*       Microbiology:  Microbiology Results (last 7 days)       ** No results found for the last 168 hours. **            I have reviewed all pertinent labs within the past 24 hours.    Estimated Creatinine Clearance: 22.6 mL/min (A) (based on SCr of 3.4 mg/dL (H)).    Diagnostic Results:  I have reviewed all pertinent imaging results/findings within the past 24 hours.

## 2024-03-26 NOTE — PLAN OF CARE
Plan of care discussed with patient. Patient is free of fall/trauma/injury. VSS,  Denies CP, SOB, or pain/discomfort. Pt transitioned from Lasix gtt to oral Bumex.  All questions addressed. Will continue to monitor     Problem: Adult Inpatient Plan of Care  Goal: Plan of Care Review  Outcome: Ongoing, Progressing  Goal: Patient-Specific Goal (Individualized)  Outcome: Ongoing, Progressing  Goal: Absence of Hospital-Acquired Illness or Injury  Outcome: Ongoing, Progressing  Goal: Optimal Comfort and Wellbeing  Outcome: Ongoing, Progressing  Goal: Readiness for Transition of Care  Outcome: Ongoing, Progressing     Problem: Fall Injury Risk  Goal: Absence of Fall and Fall-Related Injury  Outcome: Ongoing, Progressing  Intervention: Identify and Manage Contributors  Flowsheets (Taken 3/26/2024 1341)  Self-Care Promotion: independence encouraged  Medication Review/Management:   medications reviewed   dosing adjusted   high-risk medications identified     Problem: Fluid Imbalance (Heart Failure)  Goal: Fluid Balance  Outcome: Ongoing, Progressing  Intervention: Monitor and Manage Fluid Balance  Flowsheets (Taken 3/26/2024 1341)  Fluid/Electrolyte Management:   fluids restricted   electrolyte supplement adjusted

## 2024-03-27 VITALS
HEART RATE: 83 BPM | BODY MASS INDEX: 22.1 KG/M2 | RESPIRATION RATE: 18 BRPM | OXYGEN SATURATION: 100 % | TEMPERATURE: 99 F | WEIGHT: 172.19 LBS | HEIGHT: 74 IN | SYSTOLIC BLOOD PRESSURE: 82 MMHG

## 2024-03-27 PROBLEM — N18.9 CKD (CHRONIC KIDNEY DISEASE): Status: ACTIVE | Noted: 2024-03-11

## 2024-03-27 LAB
ALBUMIN SERPL BCP-MCNC: 3.1 G/DL (ref 3.5–5.2)
ALP SERPL-CCNC: 106 U/L (ref 55–135)
ALT SERPL W/O P-5'-P-CCNC: 45 U/L (ref 10–44)
ANION GAP SERPL CALC-SCNC: 9 MMOL/L (ref 8–16)
AST SERPL-CCNC: 27 U/L (ref 10–40)
BASOPHILS # BLD AUTO: 0.04 K/UL (ref 0–0.2)
BASOPHILS NFR BLD: 0.8 % (ref 0–1.9)
BILIRUB SERPL-MCNC: 0.3 MG/DL (ref 0.1–1)
BNP SERPL-MCNC: 355 PG/ML (ref 0–99)
BUN SERPL-MCNC: 56 MG/DL (ref 8–23)
CALCIUM SERPL-MCNC: 9 MG/DL (ref 8.7–10.5)
CHLORIDE SERPL-SCNC: 105 MMOL/L (ref 95–110)
CO2 SERPL-SCNC: 24 MMOL/L (ref 23–29)
CREAT SERPL-MCNC: 3.7 MG/DL (ref 0.5–1.4)
DIFFERENTIAL METHOD BLD: ABNORMAL
EOSINOPHIL # BLD AUTO: 0.1 K/UL (ref 0–0.5)
EOSINOPHIL NFR BLD: 2.5 % (ref 0–8)
ERYTHROCYTE [DISTWIDTH] IN BLOOD BY AUTOMATED COUNT: 17.2 % (ref 11.5–14.5)
EST. GFR  (NO RACE VARIABLE): 17.8 ML/MIN/1.73 M^2
GLUCOSE SERPL-MCNC: 135 MG/DL (ref 70–110)
HCT VFR BLD AUTO: 26.3 % (ref 40–54)
HGB BLD-MCNC: 8.1 G/DL (ref 14–18)
IMM GRANULOCYTES # BLD AUTO: 0.02 K/UL (ref 0–0.04)
IMM GRANULOCYTES NFR BLD AUTO: 0.4 % (ref 0–0.5)
INR PPP: 2.9 (ref 0.8–1.2)
LDH SERPL L TO P-CCNC: 261 U/L (ref 110–260)
LYMPHOCYTES # BLD AUTO: 0.9 K/UL (ref 1–4.8)
LYMPHOCYTES NFR BLD: 16.9 % (ref 18–48)
MAGNESIUM SERPL-MCNC: 2.2 MG/DL (ref 1.6–2.6)
MCH RBC QN AUTO: 27.8 PG (ref 27–31)
MCHC RBC AUTO-ENTMCNC: 30.8 G/DL (ref 32–36)
MCV RBC AUTO: 90 FL (ref 82–98)
MONOCYTES # BLD AUTO: 0.6 K/UL (ref 0.3–1)
MONOCYTES NFR BLD: 12.4 % (ref 4–15)
NEUTROPHILS # BLD AUTO: 3.5 K/UL (ref 1.8–7.7)
NEUTROPHILS NFR BLD: 67 % (ref 38–73)
NRBC BLD-RTO: 0 /100 WBC
PLATELET # BLD AUTO: 323 K/UL (ref 150–450)
PMV BLD AUTO: 10.1 FL (ref 9.2–12.9)
POTASSIUM SERPL-SCNC: 4.9 MMOL/L (ref 3.5–5.1)
PROT SERPL-MCNC: 7.2 G/DL (ref 6–8.4)
PROTHROMBIN TIME: 28.8 SEC (ref 9–12.5)
RBC # BLD AUTO: 2.91 M/UL (ref 4.6–6.2)
SODIUM SERPL-SCNC: 138 MMOL/L (ref 136–145)
WBC # BLD AUTO: 5.16 K/UL (ref 3.9–12.7)

## 2024-03-27 PROCEDURE — 85025 COMPLETE CBC W/AUTO DIFF WBC: CPT | Mod: NTX | Performed by: PHYSICIAN ASSISTANT

## 2024-03-27 PROCEDURE — 83880 ASSAY OF NATRIURETIC PEPTIDE: CPT | Mod: NTX | Performed by: PHYSICIAN ASSISTANT

## 2024-03-27 PROCEDURE — 83615 LACTATE (LD) (LDH) ENZYME: CPT | Mod: NTX | Performed by: PHYSICIAN ASSISTANT

## 2024-03-27 PROCEDURE — 36415 COLL VENOUS BLD VENIPUNCTURE: CPT | Mod: NTX | Performed by: STUDENT IN AN ORGANIZED HEALTH CARE EDUCATION/TRAINING PROGRAM

## 2024-03-27 PROCEDURE — 25000003 PHARM REV CODE 250: Mod: NTX | Performed by: STUDENT IN AN ORGANIZED HEALTH CARE EDUCATION/TRAINING PROGRAM

## 2024-03-27 PROCEDURE — 25000003 PHARM REV CODE 250: Mod: NTX | Performed by: PHYSICIAN ASSISTANT

## 2024-03-27 PROCEDURE — 93750 INTERROGATION VAD IN PERSON: CPT | Mod: NTX,,, | Performed by: INTERNAL MEDICINE

## 2024-03-27 PROCEDURE — 83735 ASSAY OF MAGNESIUM: CPT | Mod: NTX | Performed by: PHYSICIAN ASSISTANT

## 2024-03-27 PROCEDURE — 99233 SBSQ HOSP IP/OBS HIGH 50: CPT | Mod: NTX,,, | Performed by: INTERNAL MEDICINE

## 2024-03-27 PROCEDURE — 85610 PROTHROMBIN TIME: CPT | Mod: NTX | Performed by: PHYSICIAN ASSISTANT

## 2024-03-27 PROCEDURE — 27000248 HC VAD-ADDITIONAL DAY: Mod: NTX

## 2024-03-27 PROCEDURE — 80053 COMPREHEN METABOLIC PANEL: CPT | Mod: NTX | Performed by: STUDENT IN AN ORGANIZED HEALTH CARE EDUCATION/TRAINING PROGRAM

## 2024-03-27 RX ORDER — BUMETANIDE 2 MG/1
4 TABLET ORAL 2 TIMES DAILY
Qty: 120 TABLET | Refills: 11 | Status: ON HOLD | OUTPATIENT
Start: 2024-03-27 | End: 2025-03-27

## 2024-03-27 RX ORDER — HYDRALAZINE HYDROCHLORIDE 25 MG/1
75 TABLET, FILM COATED ORAL EVERY 8 HOURS
Qty: 270 TABLET | Refills: 11 | Status: ON HOLD | OUTPATIENT
Start: 2024-03-27 | End: 2024-04-30 | Stop reason: HOSPADM

## 2024-03-27 RX ORDER — METOLAZONE 5 MG/1
5 TABLET ORAL
Qty: 12 TABLET | Refills: 3 | Status: ON HOLD | OUTPATIENT
Start: 2024-03-27 | End: 2024-05-31

## 2024-03-27 RX ORDER — WARFARIN 2.5 MG/1
TABLET ORAL
Qty: 45 TABLET | Refills: 5 | Status: ON HOLD | OUTPATIENT
Start: 2024-03-27 | End: 2024-04-30

## 2024-03-27 RX ADMIN — FAMOTIDINE 20 MG: 20 TABLET, FILM COATED ORAL at 08:03

## 2024-03-27 RX ADMIN — VENLAFAXINE 37.5 MG: 37.5 TABLET ORAL at 08:03

## 2024-03-27 RX ADMIN — CHOLECALCIFEROL TAB 25 MCG (1000 UNIT) 1000 UNITS: 25 TAB at 08:03

## 2024-03-27 RX ADMIN — GABAPENTIN 100 MG: 100 CAPSULE ORAL at 08:03

## 2024-03-27 RX ADMIN — Medication 400 MG: at 08:03

## 2024-03-27 RX ADMIN — HYDRALAZINE HYDROCHLORIDE 75 MG: 50 TABLET ORAL at 06:03

## 2024-03-27 RX ADMIN — SODIUM BICARBONATE 325 MG: 325 TABLET ORAL at 08:03

## 2024-03-27 RX ADMIN — AMIODARONE HYDROCHLORIDE 400 MG: 200 TABLET ORAL at 08:03

## 2024-03-27 RX ADMIN — BUMETANIDE 4 MG: 1 TABLET ORAL at 08:03

## 2024-03-27 NOTE — PROGRESS NOTES
Discharge Note:    SW notified of pt's dc today.  SW spoke with pt's wife regarding dc. Wife alert and oriented, calm and cooperative.   No needs reported by pt's wife.  Team also reported no dc needs.  Pt's wife will transport pt home.      SW remains available if needs arise.

## 2024-03-27 NOTE — PROGRESS NOTES
Update:  SW met with pt and his spouse.  Pt was walking in hallway.  Both were alert and oriented, calm and cooperative, pleasant and smiling.  Pt reported he was feeling better, and is hopeful for dc soon.  Both stated that they are coping well and expressed no needs at this time.    SW remains available.

## 2024-03-27 NOTE — PROGRESS NOTES
[facility] - Cardiology Stepdown  Heart Transplant  Progress Note    Patient Name: Radha Abbott  MRN: [id number]  Admission Date: 3/24/2024  Hospital Length of Stay: 3 days  Attending Physician: Sajan Hurley, *  Primary Care Provider: Vasu Kong MD  Principal Problem:Acute on chronic combined systolic and diastolic CHF, NYHA class 4    Subjective:   Interval History: NAEON. No acute complaints. Discharging today.     Continuous Infusions:      Scheduled Meds:   amiodarone  400 mg Oral Daily    bumetanide  4 mg Oral BID loop    famotidine  20 mg Oral Daily    fluticasone propionate  2 spray Each Nostril Daily    gabapentin  100 mg Oral BID    hydrALAZINE  75 mg Oral Q8H    magnesium oxide  400 mg Oral Daily    sodium bicarbonate  325 mg Oral TID    traZODone  25 mg Oral QHS    venlafaxine  37.5 mg Oral Daily    vitamin D  1,000 Units Oral Daily    warfarin  1.5 mg Oral Daily     PRN Meds:acetaminophen, polyethylene glycol, senna-docusate 8.6-50 mg    Review of patient's allergies indicates:  No Known Allergies  Objective:     Vital Signs (Most Recent):  Temp: 98.5 °F (36.9 °C) (03/27/24 1129)  Pulse: 83 (03/27/24 1129)  Resp: 18 (03/27/24 0809)  BP: (!) 82/0 (03/27/24 1144)  SpO2: 100 % (03/27/24 1129) Vital Signs (24h Range):  Temp:  [97.3 °F (36.3 °C)-98.7 °F (37.1 °C)] 98.5 °F (36.9 °C)  Pulse:  [83-94] 83  Resp:  [18] 18  SpO2:  [93 %-100 %] 100 %  BP: ()/(0-69) 82/0     Patient Vitals for the past 72 hrs (Last 3 readings):   Weight   03/27/24 0809 78.1 kg (172 lb 2.9 oz)   03/26/24 1031 77 kg (169 lb 12.1 oz)   03/24/24 1942 98.1 kg (216 lb 4.3 oz)       Body mass index is 22.11 kg/m².      Intake/Output Summary (Last 24 hours) at 3/27/2024 1206  Last data filed at 3/27/2024 0609  Gross per 24 hour   Intake 740 ml   Output 1225 ml   Net -485 ml         Hemodynamic Parameters:            Physical Exam  Constitutional:       General: He is not in acute distress.     Appearance: Normal  appearance.   HENT:      Head: Normocephalic and atraumatic.   Eyes:      Conjunctiva/sclera: Conjunctivae normal.   Neck:      Vascular: JVD present.      Comments: JVP 8cm of H20.   Cardiovascular:      Comments: VAD hum appreciated  Pulmonary:      Breath sounds: Normal breath sounds.      Comments: Clear to auscultation  Abdominal:      Comments: Soft, non-tender, non-distended   Musculoskeletal:      Cervical back: Normal range of motion.      Comments: No peripheral edema   Skin:     General: Skin is warm and dry.      Capillary Refill: Capillary refill takes less than 2 seconds.   Neurological:      Mental Status: He is alert and oriented to person, place, and time.   Psychiatric:         Mood and Affect: Mood and affect normal.            Significant Labs:  CBC:  Recent Labs   Lab 03/25/24 0428 03/26/24  0503 03/27/24  0442   WBC 5.03 4.91 5.16   RBC 2.84* 2.90* 2.91*   HGB 8.0* 8.4* 8.1*   HCT 25.3* 26.1* 26.3*    336 323   MCV 89 90 90   MCH 28.2 29.0 27.8   MCHC 31.6* 32.2 30.8*       BNP:  Recent Labs   Lab 03/24/24  1320 03/25/24  0428 03/27/24  0442   BNP 1,559* 1,101* 355*       CMP:  Recent Labs   Lab 03/25/24  0428 03/25/24  1538 03/26/24  0503 03/27/24  0442   * 188* 131* 135*   CALCIUM 9.1 9.4 9.0 9.0   ALBUMIN 3.0*  --  3.2* 3.1*   PROT 7.2  --  7.0 7.2    135* 139 138   K 3.6 4.1 4.7 4.9   CO2 25 24 24 24    103 105 105   BUN 56* 54* 51* 56*   CREATININE 3.3* 3.3* 3.4* 3.7*   ALKPHOS 107  --  113 106   ALT 55*  --  53* 45*   AST 39  --  35 27   BILITOT 0.3  --  0.3 0.3        Coagulation:   Recent Labs   Lab 03/25/24  0428 03/26/24  0503 03/27/24  0442   INR 3.4* 3.2* 2.9*   APTT 45.1*  --   --        LDH:  Recent Labs   Lab 03/25/24  0429 03/26/24  0503 03/27/24  0442   * 283* 261*       Microbiology:  Microbiology Results (last 7 days)       ** No results found for the last 168 hours. **            I have reviewed all pertinent labs within the past 24  hours.    Estimated Creatinine Clearance: 23.2 mL/min (A) (based on SCr of 3.7 mg/dL (H)).    Diagnostic Results:  I have reviewed all pertinent imaging results/findings within the past 24 hours.  Assessment and Plan:     Mr Abbott is a 62 y/o  with stage D HFrEF due to ICMP w/ CAD and Hx of CABG in 2009. He did undergo HM3 placement placed by Dr Washington on 12/1/23. Lengthy post op course complicated by vasoplegia(requiring Giaprezza), debility, malnutrition/impaired swallowing, and renal failure requiring HD(since weaned off). Once medically stable, he was transferred to Rehab for further PT/OT/ST. Patient has since been discharged from rehab and permacath removed.     He presents today via the ED due to increased fluid retention, weight gain, peripheral edema, poor appetite. He was seen in HTS clinic last week and his torsemide dose was increased to 80 mg BID in an attempt to get the extra fluid weight off but he continued to retain fluid since then. Now he endorses orthopnea, poor appetite, also is concern for depressed mood since his LVAD surgery. Denies N/V/D. Denies Chest pain, lightheadedness or syncope. BNP up from 700 to 1600. Weight 180 lbs today when goal in clinic recently was set to 170 lbs. Vital signs stable in the ED.     * Acute on chronic combined systolic and diastolic CHF, NYHA class 4  Patient euvolemic on exam today. Continue bumex 4mg bid.   Home diuretic torsemide 80 BID  GDMT limited to hydralazine due to CKD  1500cc/day fluid restriction  Replace K and mag    CKD (chronic kidney disease)  Baseline Cr. 3.1-3.6    Depressed mood  - continue home venlafaxine as per psych.     LVAD (left ventricular assist device) present  Procedure: Device Interrogation Including analysis of device parameters  Current Settings: Ventricular Assist Device  INR supratherapeutic  Review of device function is stable/unstable stable  Anticoagulation w/ coumadin. Goal INR 2-3.         3/25/2024    11:28 AM 3/25/2024      8:00 AM 3/25/2024     4:27 AM 3/24/2024    11:21 PM 3/24/2024     7:49 PM 3/24/2024     1:53 PM 3/21/2024     7:29 AM   TXP LVAD INTERROGATIONS   Type HeartMate3 HeartMate3 HeartMate3 HeartMate3 HeartMate3 HeartMate3    Flow 3.8 3.4 3.7 3.6 3.9 3.6    Speed 5000 5000 5050 5000 5000 5000    PI 5.6 7.9 5.7 6.2 4.4 7.2    Power ([place]) 3.4 3.4 3.4 3.4 3.5 3.4    LSL     4600     Pulsatility   Pulse Pulse Pulse Pulse No Pulse         Ventricular tachycardia  Continue amio 400 qd        Angela Shen MD  Heart Transplant  [facility] - Cardiology Stepdown

## 2024-03-27 NOTE — ASSESSMENT & PLAN NOTE
Patient euvolemic on exam today. Continue bumex 4mg bid.   Home diuretic torsemide 80 BID  GDMT limited to hydralazine due to CKD  1500cc/day fluid restriction  Replace K and mag

## 2024-03-27 NOTE — HOSPITAL COURSE
Upon admission, patient was started on IV diuresis and responded appropriately. Patient switched to PO bumex 4mg bid and maintained euvolemia. Patient given instructions to check weight daily and if gains more than 5lbs in a week he was instructed to take a PRN metolozone 5mg dose. Patient also instructed to get repeat BMP on Monday and follow up in clinic in 2 weeks.

## 2024-03-27 NOTE — SUBJECTIVE & OBJECTIVE
Interval History: NAEON. No acute complaints. Discharging today.     Continuous Infusions:      Scheduled Meds:   amiodarone  400 mg Oral Daily    bumetanide  4 mg Oral BID loop    famotidine  20 mg Oral Daily    fluticasone propionate  2 spray Each Nostril Daily    gabapentin  100 mg Oral BID    hydrALAZINE  75 mg Oral Q8H    magnesium oxide  400 mg Oral Daily    sodium bicarbonate  325 mg Oral TID    traZODone  25 mg Oral QHS    venlafaxine  37.5 mg Oral Daily    vitamin D  1,000 Units Oral Daily    warfarin  1.5 mg Oral Daily     PRN Meds:acetaminophen, polyethylene glycol, senna-docusate 8.6-50 mg    Review of patient's allergies indicates:  No Known Allergies  Objective:     Vital Signs (Most Recent):  Temp: 98.5 °F (36.9 °C) (03/27/24 1129)  Pulse: 83 (03/27/24 1129)  Resp: 18 (03/27/24 0809)  BP: (!) 82/0 (03/27/24 1144)  SpO2: 100 % (03/27/24 1129) Vital Signs (24h Range):  Temp:  [97.3 °F (36.3 °C)-98.7 °F (37.1 °C)] 98.5 °F (36.9 °C)  Pulse:  [83-94] 83  Resp:  [18] 18  SpO2:  [93 %-100 %] 100 %  BP: ()/(0-69) 82/0     Patient Vitals for the past 72 hrs (Last 3 readings):   Weight   03/27/24 0809 78.1 kg (172 lb 2.9 oz)   03/26/24 1031 77 kg (169 lb 12.1 oz)   03/24/24 1942 98.1 kg (216 lb 4.3 oz)       Body mass index is 22.11 kg/m².      Intake/Output Summary (Last 24 hours) at 3/27/2024 1206  Last data filed at 3/27/2024 0609  Gross per 24 hour   Intake 740 ml   Output 1225 ml   Net -485 ml         Hemodynamic Parameters:            Physical Exam  Constitutional:       General: He is not in acute distress.     Appearance: Normal appearance.   HENT:      Head: Normocephalic and atraumatic.   Eyes:      Conjunctiva/sclera: Conjunctivae normal.   Neck:      Vascular: JVD present.      Comments: JVP 8cm of H20.   Cardiovascular:      Comments: VAD hum appreciated  Pulmonary:      Breath sounds: Normal breath sounds.      Comments: Clear to auscultation  Abdominal:      Comments: Soft, non-tender,  non-distended   Musculoskeletal:      Cervical back: Normal range of motion.      Comments: No peripheral edema   Skin:     General: Skin is warm and dry.      Capillary Refill: Capillary refill takes less than 2 seconds.   Neurological:      Mental Status: He is alert and oriented to person, place, and time.   Psychiatric:         Mood and Affect: Mood and affect normal.            Significant Labs:  CBC:  Recent Labs   Lab 03/25/24 0428 03/26/24  0503 03/27/24  0442   WBC 5.03 4.91 5.16   RBC 2.84* 2.90* 2.91*   HGB 8.0* 8.4* 8.1*   HCT 25.3* 26.1* 26.3*    336 323   MCV 89 90 90   MCH 28.2 29.0 27.8   MCHC 31.6* 32.2 30.8*       BNP:  Recent Labs   Lab 03/24/24  1320 03/25/24  0428 03/27/24  0442   BNP 1,559* 1,101* 355*       CMP:  Recent Labs   Lab 03/25/24  0428 03/25/24  1538 03/26/24  0503 03/27/24  0442   * 188* 131* 135*   CALCIUM 9.1 9.4 9.0 9.0   ALBUMIN 3.0*  --  3.2* 3.1*   PROT 7.2  --  7.0 7.2    135* 139 138   K 3.6 4.1 4.7 4.9   CO2 25 24 24 24    103 105 105   BUN 56* 54* 51* 56*   CREATININE 3.3* 3.3* 3.4* 3.7*   ALKPHOS 107  --  113 106   ALT 55*  --  53* 45*   AST 39  --  35 27   BILITOT 0.3  --  0.3 0.3        Coagulation:   Recent Labs   Lab 03/25/24 0428 03/26/24  0503 03/27/24  0442   INR 3.4* 3.2* 2.9*   APTT 45.1*  --   --        LDH:  Recent Labs   Lab 03/25/24  0429 03/26/24  0503 03/27/24  0442   * 283* 261*       Microbiology:  Microbiology Results (last 7 days)       ** No results found for the last 168 hours. **            I have reviewed all pertinent labs within the past 24 hours.    Estimated Creatinine Clearance: 23.2 mL/min (A) (based on SCr of 3.7 mg/dL (H)).    Diagnostic Results:  I have reviewed all pertinent imaging results/findings within the past 24 hours.

## 2024-03-27 NOTE — NURSING
Patient being discharged home, AVS given with all questions/concerns answered, IV/heart monitor removed, VAD equipment inventory complete, dc meds sent to [facility] patient stated he will stop by with his wife on the way out, patient left via wheelchair with wife.

## 2024-03-27 NOTE — PROGRESS NOTES
03/27/2024  [place] Duarte    Current provider:  Sajan Hurley, *    Device interrogation:      3/27/2024     7:39 AM 3/27/2024     5:16 AM 3/26/2024     7:10 PM 3/26/2024     4:11 PM 3/26/2024    11:26 AM 3/26/2024     7:46 AM 3/26/2024     3:43 AM   TXP LVAD INTERROGATIONS   Type HeartMate3 HeartMate3 HeartMate3 HeartMate3 HeartMate3 HeartMate3 HeartMate3   Flow 4.2 4 4 4.4 4 3.8 3.7   Speed 5000 5000 5000 5000 5000 5000 5000   PI 3.7 4.8 4.9 3.3 4.9 5.5 6.7   Power ([place]) 3.4 3.4 3.4 3.4 3.5 3.4 3.4   LSL       4600   Pulsatility       Intermittent pulse          Rounded on [facility] to ensure all mechanical assist device settings (IABP or VAD) were appropriate and all parameters were within limits.  I was able to ensure all back up equipment was present, the staff had no issues, and the Perfusion Department daily rounding was complete.      For implantable VADs: Interrogation of Ventricular assist device was performed with analysis of device parameters and review of device function. I have personally reviewed the interrogation findings and agree with findings as stated.     In emergency, the nursing units have been notified to contact the perfusion department either by:  Calling [phone] from 630am to 4pm Mon thru Fri, utilizing the On-Call Finder functionality of Epic and searching for Perfusion, or by contacting the hospital  from 4pm to 630am and on weekends and asking to speak with the perfusionist on call.    10:01 AM

## 2024-03-28 NOTE — PROGRESS NOTES
Admitted 3/24-3/27. Hospital course: presented via the ED due to increased fluid retention, weight gain, peripheral edema, poor appetite. Upon admission, patient was started on IV diuresis and responded appropriately. Patient switched to PO bumex 4mg bid and maintained euvolemia. Patient given instructions to check weight daily and if gains more than 5lbs in a week he was instructed to take a PRN metolozone 5mg dose. Patient also instructed to get repeat BMP on Monday and follow up in clinic in 2 weeks.

## 2024-03-28 NOTE — PROGRESS NOTES
Spoke with pt wife who confirmed correct dosing per calendar. Informed that INR orders have been linked to lab appt on 4/2 at [facility] - Lab. Pt wife verbalized understanding.

## 2024-04-01 ENCOUNTER — LAB VISIT (OUTPATIENT)
Dept: LAB | Facility: HOSPITAL | Age: 64
End: 2024-04-01
Attending: INTERNAL MEDICINE
Payer: MEDICAID

## 2024-04-01 ENCOUNTER — DOCUMENTATION ONLY (OUTPATIENT)
Dept: TRANSPLANT | Facility: CLINIC | Age: 64
End: 2024-04-01
Payer: MEDICAID

## 2024-04-01 ENCOUNTER — ANTI-COAG VISIT (OUTPATIENT)
Dept: CARDIOLOGY | Facility: CLINIC | Age: 64
End: 2024-04-01
Payer: MEDICAID

## 2024-04-01 DIAGNOSIS — Z95.811 LVAD (LEFT VENTRICULAR ASSIST DEVICE) PRESENT: Primary | ICD-10-CM

## 2024-04-01 DIAGNOSIS — Z95.811 PRESENCE OF VENTRICULAR ASSIST DEVICE: ICD-10-CM

## 2024-04-01 LAB
ALBUMIN SERPL BCP-MCNC: 3.6 G/DL (ref 3.5–5.2)
ALP SERPL-CCNC: 125 U/L (ref 55–135)
ALT SERPL W/O P-5'-P-CCNC: 54 U/L (ref 10–44)
ANION GAP SERPL CALC-SCNC: 15 MMOL/L (ref 8–16)
AST SERPL-CCNC: 43 U/L (ref 10–40)
BILIRUB SERPL-MCNC: 0.4 MG/DL (ref 0.1–1)
BNP SERPL-MCNC: 888 PG/ML (ref 0–99)
BUN SERPL-MCNC: 60 MG/DL (ref 8–23)
CALCIUM SERPL-MCNC: 9.2 MG/DL (ref 8.7–10.5)
CHLORIDE SERPL-SCNC: 102 MMOL/L (ref 95–110)
CO2 SERPL-SCNC: 21 MMOL/L (ref 23–29)
CREAT SERPL-MCNC: 4.3 MG/DL (ref 0.5–1.4)
EST. GFR  (NO RACE VARIABLE): 15 ML/MIN/1.73 M^2
GLUCOSE SERPL-MCNC: 150 MG/DL (ref 70–110)
INR PPP: 2.3 (ref 0.8–1.2)
POTASSIUM SERPL-SCNC: 4.6 MMOL/L (ref 3.5–5.1)
PROT SERPL-MCNC: 7.9 G/DL (ref 6–8.4)
PROTHROMBIN TIME: 25.7 SEC (ref 9–12.5)
SODIUM SERPL-SCNC: 138 MMOL/L (ref 136–145)

## 2024-04-01 PROCEDURE — 83880 ASSAY OF NATRIURETIC PEPTIDE: CPT | Performed by: INTERNAL MEDICINE

## 2024-04-01 PROCEDURE — 36415 COLL VENOUS BLD VENIPUNCTURE: CPT | Performed by: INTERNAL MEDICINE

## 2024-04-01 PROCEDURE — 80053 COMPREHEN METABOLIC PANEL: CPT | Performed by: INTERNAL MEDICINE

## 2024-04-01 PROCEDURE — 93793 ANTICOAG MGMT PT WARFARIN: CPT | Mod: ,,,

## 2024-04-01 PROCEDURE — 85610 PROTHROMBIN TIME: CPT | Performed by: INTERNAL MEDICINE

## 2024-04-01 NOTE — PROGRESS NOTES
[facility]    2024 9:54 AM    Assessment/Plan:    Patient presents today with therapeutic INR.    Assessment of patient findings and chart review: no significant findings     Recommendation for patient's warfarin regimen: Continue current maintenance dose    Recommend repeat INR Thursday  _________________________________________________________________    Radha Abbott (63 y.o.) is followed by the [facility].    Anticoagulation Summary  As of 2024      INR goal:  2.0-3.0   TTR:  41.7 % (1.7 mo)   INR used for dosin.3 (2024)   Warfarin maintenance plan:  2.5 mg (2.5 mg x 1) every Mon, Wed, Fri; 3.75 mg (2.5 mg x 1.5) all other days   Weekly warfarin total:  22.5 mg   Plan last modified:  Jesenia Jensen, PharmD (3/28/2024)   Next INR check:  2024   Target end date:      Indications    LVAD (left ventricular assist device) present [Z95.811]                 Anticoagulation Episode Summary       INR check location:      Preferred lab:      Send INR reminders to:  [facility] COUMADIN LVAD    Comments:  [place]          Anticoagulation Care Providers       Provider Role Specialty Phone number    Sajan Hurley MD [facility] [phone]

## 2024-04-02 ENCOUNTER — TELEPHONE (OUTPATIENT)
Dept: TRANSPLANT | Facility: CLINIC | Age: 64
End: 2024-04-02
Payer: MEDICAID

## 2024-04-02 NOTE — TELEPHONE ENCOUNTER
Reviewed lab results with patient's spouse. Reports patient has been having nasal congestion but otherwise is feeling well. Denies swelling, cough, shortness of breath, dyspnea on exertion, chest pain, nausea, vomiting, or early satiety. Cr is elevated. Will discuss with MD.    Discussed with Dr Price, repeat labs scheduled for later this week.

## 2024-04-04 ENCOUNTER — LAB VISIT (OUTPATIENT)
Dept: LAB | Facility: HOSPITAL | Age: 64
End: 2024-04-04
Attending: INTERNAL MEDICINE
Payer: MEDICAID

## 2024-04-04 ENCOUNTER — ANTI-COAG VISIT (OUTPATIENT)
Dept: CARDIOLOGY | Facility: CLINIC | Age: 64
End: 2024-04-04
Payer: MEDICAID

## 2024-04-04 ENCOUNTER — TELEPHONE (OUTPATIENT)
Dept: TRANSPLANT | Facility: CLINIC | Age: 64
End: 2024-04-04
Payer: MEDICAID

## 2024-04-04 DIAGNOSIS — Z95.811 LVAD (LEFT VENTRICULAR ASSIST DEVICE) PRESENT: Primary | ICD-10-CM

## 2024-04-04 DIAGNOSIS — Z95.811 PRESENCE OF VENTRICULAR ASSIST DEVICE: ICD-10-CM

## 2024-04-04 LAB
ALBUMIN SERPL BCP-MCNC: 3.4 G/DL (ref 3.5–5.2)
ALP SERPL-CCNC: 136 U/L (ref 55–135)
ALT SERPL W/O P-5'-P-CCNC: 67 U/L (ref 10–44)
ANION GAP SERPL CALC-SCNC: 11 MMOL/L (ref 8–16)
AST SERPL-CCNC: 44 U/L (ref 10–40)
BILIRUB SERPL-MCNC: 0.3 MG/DL (ref 0.1–1)
BNP SERPL-MCNC: 1072 PG/ML (ref 0–99)
BUN SERPL-MCNC: 58 MG/DL (ref 8–23)
CALCIUM SERPL-MCNC: 8.9 MG/DL (ref 8.7–10.5)
CHLORIDE SERPL-SCNC: 101 MMOL/L (ref 95–110)
CO2 SERPL-SCNC: 25 MMOL/L (ref 23–29)
CREAT SERPL-MCNC: 3.8 MG/DL (ref 0.5–1.4)
EST. GFR  (NO RACE VARIABLE): 17 ML/MIN/1.73 M^2
GLUCOSE SERPL-MCNC: 210 MG/DL (ref 70–110)
INR PPP: 3.3 (ref 0.8–1.2)
POTASSIUM SERPL-SCNC: 4.3 MMOL/L (ref 3.5–5.1)
PROT SERPL-MCNC: 7.8 G/DL (ref 6–8.4)
PROTHROMBIN TIME: 35.7 SEC (ref 9–12.5)
SODIUM SERPL-SCNC: 137 MMOL/L (ref 136–145)

## 2024-04-04 PROCEDURE — 36415 COLL VENOUS BLD VENIPUNCTURE: CPT | Performed by: INTERNAL MEDICINE

## 2024-04-04 PROCEDURE — 83880 ASSAY OF NATRIURETIC PEPTIDE: CPT | Performed by: INTERNAL MEDICINE

## 2024-04-04 PROCEDURE — 93793 ANTICOAG MGMT PT WARFARIN: CPT | Mod: ,,,

## 2024-04-04 PROCEDURE — 85610 PROTHROMBIN TIME: CPT | Performed by: INTERNAL MEDICINE

## 2024-04-04 PROCEDURE — 80053 COMPREHEN METABOLIC PANEL: CPT | Performed by: INTERNAL MEDICINE

## 2024-04-04 NOTE — PROGRESS NOTES
[facility]    04/04/2024 10:14 AM    Radha Abbott (63 y.o.) is followed by the [facility].      Assessment/Plan:    Radha Abbott presents today with supratherapeutic INR. Goal INR 2.0-3.0    Assessment of patient findings per MA/LPN and chart review:   The following significant findings were found:  Patient reports vomiting once today (potential increase)  Reports having more stress lately (potential increase)  Reports taking metolazone for weight gain/fluid (potential increase)    Recommendation for patient's warfarin regimen:   Due to supratherapeutic INR, patient was instructed to take a reduced warfarin dose today, weekly warfarin dose decreased due to repeated supratherapeutic INRs.    Recommended repeat INR in 4 days      Joycelyn Angel, PharmD, BCPS  Clinical Pharmacist - Coumadin Clinic  Preferred Contact: Secure Messaging or In Basket Message

## 2024-04-04 NOTE — TELEPHONE ENCOUNTER
Reviewed labs, Cr improved at 3.8 but BNP is elevated at 1072. Reports he has been gaining 2 lbs daily since Monday for a total of 6lbs this week. Took one dose of metolazone this morning. Also reports some nausea this morning that has since improved. Message sent to Dr Price.

## 2024-04-04 NOTE — PROGRESS NOTES
Arlyn reports correct Warfarin dose, stated pt took Robitussin 4/3/2024, vomited once today, increased stress, took Metolazone today for weight gain, no other changes reported.

## 2024-04-06 ENCOUNTER — DOCUMENT SCAN (OUTPATIENT)
Dept: HOME HEALTH SERVICES | Facility: HOSPITAL | Age: 64
End: 2024-04-06
Payer: MEDICAID

## 2024-04-08 ENCOUNTER — ANTI-COAG VISIT (OUTPATIENT)
Dept: CARDIOLOGY | Facility: CLINIC | Age: 64
End: 2024-04-08
Payer: MEDICAID

## 2024-04-08 ENCOUNTER — LAB VISIT (OUTPATIENT)
Dept: LAB | Facility: HOSPITAL | Age: 64
End: 2024-04-08
Attending: INTERNAL MEDICINE
Payer: MEDICAID

## 2024-04-08 DIAGNOSIS — Z95.811 PRESENCE OF VENTRICULAR ASSIST DEVICE: ICD-10-CM

## 2024-04-08 DIAGNOSIS — Z95.811 LVAD (LEFT VENTRICULAR ASSIST DEVICE) PRESENT: Primary | ICD-10-CM

## 2024-04-08 LAB
ALBUMIN SERPL BCP-MCNC: 3.7 G/DL (ref 3.5–5.2)
ALP SERPL-CCNC: 141 U/L (ref 55–135)
ALT SERPL W/O P-5'-P-CCNC: 86 U/L (ref 10–44)
ANION GAP SERPL CALC-SCNC: 17 MMOL/L (ref 8–16)
AST SERPL-CCNC: 61 U/L (ref 10–40)
BILIRUB SERPL-MCNC: 0.4 MG/DL (ref 0.1–1)
BNP SERPL-MCNC: 430 PG/ML (ref 0–99)
BUN SERPL-MCNC: 74 MG/DL (ref 8–23)
CALCIUM SERPL-MCNC: 9.5 MG/DL (ref 8.7–10.5)
CHLORIDE SERPL-SCNC: 96 MMOL/L (ref 95–110)
CO2 SERPL-SCNC: 24 MMOL/L (ref 23–29)
CREAT SERPL-MCNC: 4.4 MG/DL (ref 0.5–1.4)
EST. GFR  (NO RACE VARIABLE): 14 ML/MIN/1.73 M^2
GLUCOSE SERPL-MCNC: 161 MG/DL (ref 70–110)
INR PPP: 2.6 (ref 0.8–1.2)
POTASSIUM SERPL-SCNC: 4 MMOL/L (ref 3.5–5.1)
PROT SERPL-MCNC: 8.5 G/DL (ref 6–8.4)
PROTHROMBIN TIME: 26.7 SEC (ref 9–12.5)
SODIUM SERPL-SCNC: 137 MMOL/L (ref 136–145)

## 2024-04-08 PROCEDURE — 93793 ANTICOAG MGMT PT WARFARIN: CPT | Mod: ,,,

## 2024-04-08 PROCEDURE — 80053 COMPREHEN METABOLIC PANEL: CPT | Performed by: INTERNAL MEDICINE

## 2024-04-08 PROCEDURE — 85610 PROTHROMBIN TIME: CPT | Performed by: INTERNAL MEDICINE

## 2024-04-08 PROCEDURE — 83880 ASSAY OF NATRIURETIC PEPTIDE: CPT | Performed by: INTERNAL MEDICINE

## 2024-04-08 PROCEDURE — 36415 COLL VENOUS BLD VENIPUNCTURE: CPT | Performed by: INTERNAL MEDICINE

## 2024-04-08 NOTE — PROGRESS NOTES
[facility]    2024 9:44 AM    Assessment/Plan:    Patient presents today with therapeutic INR.    Assessment of patient findings and chart review: no significant findings     Recommendation for patient's warfarin regimen: Continue current maintenance dose    Recommend repeat INR Thursday  _________________________________________________________________    Radha Abbott (63 y.o.) is followed by the [facility].    Anticoagulation Summary  As of 2024      INR goal:  2.0-3.0   TTR:  44.3 % (1.9 mo)   INR used for dosin.6 (2024)   Warfarin maintenance plan:  3.75 mg (2.5 mg x 1.5) every Tue, Thu; 2.5 mg (2.5 mg x 1) all other days   Weekly warfarin total:  20 mg   Plan last modified:  Joycelyn Angel, PharmD (2024)   Next INR check:  2024   Target end date:      Indications    LVAD (left ventricular assist device) present [Z95.811]                 Anticoagulation Episode Summary       INR check location:      Preferred lab:      Send INR reminders to:  [facility] COUMADIN LVAD    Comments:  [place]          Anticoagulation Care Providers       Provider Role Specialty Phone number    Sajan Hurley MD [facility] [phone]

## 2024-04-09 ENCOUNTER — TELEPHONE (OUTPATIENT)
Dept: TRANSPLANT | Facility: CLINIC | Age: 64
End: 2024-04-09
Payer: MEDICAID

## 2024-04-09 NOTE — TELEPHONE ENCOUNTER
Page received from patient's wife stating that he is having a nose bleed that will not stop. When asking more questions, the patient states that he is only having bleeding when he blows his nose and it is in the morning. It does not continue bleeding. Blood is also more consistent with blood clots.     I advised for patient to use Nasal Saline Spray twice daily to moisten nasal cavity as patient could be having dry nose with allergies. If patient continues to have clots or nose starts to bleed constantly, please page me back. Wife and patient verbalize understanding.

## 2024-04-10 ENCOUNTER — TELEPHONE (OUTPATIENT)
Dept: TRANSPLANT | Facility: CLINIC | Age: 64
End: 2024-04-10
Payer: MEDICAID

## 2024-04-10 NOTE — TELEPHONE ENCOUNTER
Reviewed labs from 4/8/24 with Mrs. Abbott.   She reports his weight is stable at 169 since taking the metolazone 4/4/24.  Denies N/V, denies SOB or swelling.  They are coming to clinic tomorrow.  I explained if  wants to make any changes before tomorrow I will call her back.   Discussed with Dr. Ocampo.  Orders received to skip tonights dose of bumex and take 2 mg tomorrow.  Will assess pt tomorrow in clinic.   Called Mrs. Abbott, not available, left a vm to call me back.  Called other numbers additional times, not available. Left voice messages to call me back.

## 2024-04-11 ENCOUNTER — TELEPHONE (OUTPATIENT)
Dept: NEPHROLOGY | Facility: CLINIC | Age: 64
End: 2024-04-11
Payer: MEDICAID

## 2024-04-11 ENCOUNTER — OFFICE VISIT (OUTPATIENT)
Dept: TRANSPLANT | Facility: CLINIC | Age: 64
End: 2024-04-11
Payer: MEDICAID

## 2024-04-11 ENCOUNTER — CLINICAL SUPPORT (OUTPATIENT)
Dept: TRANSPLANT | Facility: CLINIC | Age: 64
End: 2024-04-11
Payer: MEDICAID

## 2024-04-11 ENCOUNTER — ANTI-COAG VISIT (OUTPATIENT)
Dept: CARDIOLOGY | Facility: CLINIC | Age: 64
End: 2024-04-11
Payer: MEDICAID

## 2024-04-11 VITALS
TEMPERATURE: 98 F | SYSTOLIC BLOOD PRESSURE: 88 MMHG | HEIGHT: 74 IN | WEIGHT: 173 LBS | BODY MASS INDEX: 22.2 KG/M2 | HEART RATE: 80 BPM

## 2024-04-11 DIAGNOSIS — I50.42 CHRONIC COMBINED SYSTOLIC AND DIASTOLIC HEART FAILURE: ICD-10-CM

## 2024-04-11 DIAGNOSIS — Z95.811 HEART REPLACED: Primary | ICD-10-CM

## 2024-04-11 DIAGNOSIS — E11.9 TYPE 2 DIABETES MELLITUS WITHOUT COMPLICATION, WITHOUT LONG-TERM CURRENT USE OF INSULIN: ICD-10-CM

## 2024-04-11 DIAGNOSIS — I48.0 PAF (PAROXYSMAL ATRIAL FIBRILLATION): ICD-10-CM

## 2024-04-11 DIAGNOSIS — Z95.811 LVAD (LEFT VENTRICULAR ASSIST DEVICE) PRESENT: Primary | ICD-10-CM

## 2024-04-11 DIAGNOSIS — Z91.89 AT RISK FOR AMIODARONE TOXICITY WITH LONG TERM USE: ICD-10-CM

## 2024-04-11 DIAGNOSIS — I25.118 CORONARY ARTERY DISEASE OF NATIVE ARTERY OF NATIVE HEART WITH STABLE ANGINA PECTORIS: ICD-10-CM

## 2024-04-11 DIAGNOSIS — I47.20 VENTRICULAR TACHYCARDIA: ICD-10-CM

## 2024-04-11 DIAGNOSIS — Z79.899 AT RISK FOR AMIODARONE TOXICITY WITH LONG TERM USE: ICD-10-CM

## 2024-04-11 DIAGNOSIS — Z95.811 PRESENCE OF VENTRICULAR ASSIST DEVICE: Primary | ICD-10-CM

## 2024-04-11 PROCEDURE — 99999 PR PBB SHADOW E&M-EST. PATIENT-LVL IV: CPT | Mod: PBBFAC,,, | Performed by: INTERNAL MEDICINE

## 2024-04-11 PROCEDURE — 3044F HG A1C LEVEL LT 7.0%: CPT | Mod: CPTII,,, | Performed by: INTERNAL MEDICINE

## 2024-04-11 PROCEDURE — 1111F DSCHRG MED/CURRENT MED MERGE: CPT | Mod: CPTII,,, | Performed by: INTERNAL MEDICINE

## 2024-04-11 PROCEDURE — 99214 OFFICE O/P EST MOD 30 MIN: CPT | Mod: PBBFAC | Performed by: INTERNAL MEDICINE

## 2024-04-11 PROCEDURE — 93750 INTERROGATION VAD IN PERSON: CPT | Mod: S$PBB,,, | Performed by: INTERNAL MEDICINE

## 2024-04-11 PROCEDURE — 99215 OFFICE O/P EST HI 40 MIN: CPT | Mod: S$PBB,,, | Performed by: INTERNAL MEDICINE

## 2024-04-11 PROCEDURE — 3008F BODY MASS INDEX DOCD: CPT | Mod: CPTII,,, | Performed by: INTERNAL MEDICINE

## 2024-04-11 PROCEDURE — 3066F NEPHROPATHY DOC TX: CPT | Mod: CPTII,,, | Performed by: INTERNAL MEDICINE

## 2024-04-11 NOTE — PROGRESS NOTES
"Date of Implant with Heartmate 3 LVAD: 1/21/23    PATIENT ARRIVED IN CLINIC:  Ambulatory   Accompanied by: "baby sister" Teetee  Complaints/reason for visit today: routine    Vitals  Temperature, oral:   Temp Readings from Last 1 Encounters:   04/11/24 97.8 °F (36.6 °C) (Oral)     Blood Pressure:   BP Readings from Last 3 Encounters:   04/11/24 (!) 88/0   03/27/24 (!) 82/0   03/21/24 (!) 84/0        VAD Interrogation:      4/11/2024    12:23 PM 3/26/2024     3:43 AM 3/26/2024    12:23 AM   TXP PINA INTERROGATIONS   Type HeartMate3     Flow 3.4     Speed 5000     PI 8.5     Power (Carrington) 3.5     LSL 4600     Pulsatility No Pulse Intermittent pulse Intermittent pulse     HCT:   Lab Results   Component Value Date    HCT 28.5 (L) 04/11/2024    HCT 24 (L) 12/26/2023       Problems / Issues / Alarms with VAD if any: None noted  VAD Sounds: HM3     VAD Binder With Patient: No  Reviewed VAD Numbers In Binder: No    Equipment:  Emergency Equipment With Patient: Yes  Any Equipment Issues: None noted   It is medically necessary to ensure patient has properly functioning equipment and wearables to prevent infection, injury or death to patient.     DLES Assessment and Dressing Care:  Appearance of DLES: "1"  Antibiotics: NO  Velour: No  Manual & Visual Inspection Of Driveline: No kinks or tears noted  Stabilization Device In Use: yes, goodman securement device    Heartmate 3 Module Cable:  No yellow exposed and Attempted to unscrew modular cable to ensure it will be able to come lose in the event we ever need to change the modular cable while patient held the driveline in place so it would not move. Modular cable connection able to be unscrewed and re-tightened. Instructed pt to perform this weekly.  Frequency of Dressing Changes: weekly & silverlon kit   Pt In Need Of Management Kits?:no   It is medically necessary to have VAD management kits in order to prevent infection or to assist in the healing of an infected " DLES.    Assessment/ Quality of Life Survery:   Complaints Of Nausea / Vomiting: None noted    Appearance and Frequency Of Stools: normal and formed without blood & daily  Color Of Urine: clear/yellow  Pain: NO  Coping/Depression/Anxiety: coping okay  Sleep Habits: 8 hrs /night  Sleep Aids: None noted  Showering: Yes, reminded to change dressing immediately after drying off  Self- Care I have no problems with self- care  Mobility I have no problems walking about  Usual Activities I have no problems with performing my usual activities  Activity/Exercise: Walking twice a week, but that's it.    Driving: Yes. Reminded to pull over should there be an alarm before looking down at controller.    Labs:    Chemistry        Component Value Date/Time     (L) 04/11/2024 1052    K 3.9 04/11/2024 1052    CL 96 04/11/2024 1052    CO2 26 04/11/2024 1052    BUN 72 (H) 04/11/2024 1052    CREATININE 4.1 (H) 04/11/2024 1052     (H) 04/11/2024 1052        Component Value Date/Time    CALCIUM 9.5 04/11/2024 1052    ALKPHOS 130 04/11/2024 1052    AST 55 (H) 04/11/2024 1052    ALT 77 (H) 04/11/2024 1052    BILITOT 0.3 04/11/2024 1052    ESTGFRAFRICA >60.0 02/07/2018 0935    EGFRNONAA >60.0 02/07/2018 0935            Magnesium   Date Value Ref Range Status   04/11/2024 1.9 1.6 - 2.6 mg/dL Final       Lab Results   Component Value Date    WBC 6.57 04/11/2024    HGB 8.9 (L) 04/11/2024    HCT 28.5 (L) 04/11/2024    MCV 91 04/11/2024     04/11/2024       Lab Results   Component Value Date    INR 2.7 (H) 04/11/2024    INR 2.6 (H) 04/08/2024    INR 3.3 (H) 04/04/2024       BNP   Date Value Ref Range Status   04/11/2024 974 (H) 0 - 99 pg/mL Final     Comment:     Values of less than 100 pg/ml are consistent with non-CHF populations.   04/08/2024 430 (H) 0 - 99 pg/mL Final     Comment:     Values of less than 100 pg/ml are consistent with non-CHF populations.   04/04/2024 1,072 (H) 0 - 99 pg/mL Final     Comment:     Values of  less than 100 pg/ml are consistent with non-CHF populations.       LD   Date Value Ref Range Status   04/11/2024 328 (H) 110 - 260 U/L Final     Comment:     Results are increased in hemolyzed samples.   03/27/2024 261 (H) 110 - 260 U/L Final     Comment:     Results are increased in hemolyzed samples.   03/26/2024 283 (H) 110 - 260 U/L Final     Comment:     Results are increased in hemolyzed samples.       Labs reviewed with patient: YES Creatinine remains elevated at 4.1.  Dr. Ocampo doesn't want to make any changes except to avoid metolazone in the future.     Medication Reconciliation: per MA.  New Medication Detail Provided: yes  Coumadin Managed by: [facility]    Education: Reviewed driveline care, emergency procedures, how to change the controller, alarms with patient, as well as discussed how to page the VAD coordinator in case of an emergency.   Educated patient/family that chest compressions are allowed in the event they are needed.    Reminded patient/caregiver not to touch their face and to cover their mouth when they cough or sneeze.   Vaccines: Pt informed that we are encouraging all VAD patients to receive the Flu and COVID vaccines and boosters. Informed pt that they can take tylenol but should avoid other NSAIDs.       Plans/Needs: Pt right leg has been weak since his VAD surgery.   I asked him where is a place he can go for outpatient physical therapy close to home.  I explained that most placed don't require a referral any longer, but if the one he wants to go to asks for a referral, then call me with the name and phone number to that place and I will send a referral.  Bothaltagracia verbalized understanding and agreement.    I asked pt if he or his wife received the voicemails I left yesterday on 2 different phones.  Both said no.  I explained Dr. Ocampo wanted to make changes to his bumex last night and this morning, but the changes were not done since they didn't get my messages.    Dr. Ocampo  reviewed labs today, doesn't want to make any changes.  Dr. Ocampo advised to avoid metolazone in the future.     Hurricane Season: No

## 2024-04-11 NOTE — PROGRESS NOTES
"Subjective:   Patient ID:  Radha Abbott is a 63 y.o. male who presents for LVAD followup visit.    Implant Date:12/1/23     Heartmate 3 RPM 5000     INR goal: 2-3   Bridge with Heparin   Antiplatelets: None d/t low H/H on implant         4/11/2024    12:23 PM 3/26/2024     3:43 AM 3/26/2024    12:23 AM 3/25/2024     8:11 PM 3/25/2024     4:27 AM 3/24/2024    11:21 PM 3/24/2024     7:49 PM   TXP PINA INTERROGATIONS   Type HeartMate3         Flow 3.4         Speed 5000         PI 8.5         Power (Carrington) 3.5         LSL 4600         Pulsatility No Pulse Intermittent pulse Intermittent pulse Intermittent pulse Pulse Pulse Pulse       HPI  Mr. Radha Abbott is a very pleasant 64 yo male with stage D HFrEF, ICMP who was recently discharged from our service after being treated for ADHF/CS. He did undergo HM3 placement placed by Dr Washington on 12/1/23. Lengthy post op course complicated by vasoplegia(requiring Giaprezza), debility, malnutrition/impaired swallowing, and renal failure requiring HD(since weaned off). Once medically stable, he was transferred to Rehab for further PT/OT/ST. Of note, pt left with permacath still in place. He has done really well in rehab and now has continued to progress well at home. Today comes for a follow-up visit. Has no complaints. Was given metolazone last week for weight gain. He is on a very high dose of Bumex 4 mg twice daily. VAD speed is at 5000 rpm. No VAD alarms noted on interrogation occasional PI events . BP is 88 mm of Hg. DLES is a "1". INR is therapeutic at 2.7. LDh is at baseline.     2D Echo with CFD done on 3/26/2024  LVAD: There is a Heartmate III LVAD running at 5000 RPM. The aortic valve does not open. The ventricular septum is at midline. Inflow cannula well seated at the apex. Outflow graft not visualized.    Left Ventricle: The left ventricle is moderately dilated. Normal wall thickness. Global hypokinesis present. Septal motion is abnormal. There is severely reduced " "systolic function with a visually estimated ejection fraction of 5 - 10%. There is diastolic dysfunction but grade cannot be determined.    Right Ventricle: Mild right ventricular enlargement. Wall thickness is normal. Right ventricle wall motion  is normal. Systolic function is normal. Pacemaker lead present in the ventricle.    Left Atrium: Left atrium is severely dilated.    Right Atrium: Right atrium is moderately dilated.    Mitral Valve: The mitral valve is repaired with an Noble stitch. There is mild regurgitation.    IVC/SVC: Normal venous pressure at 3 mmHg.     Review of Systems   Constitutional: Negative. Negative for chills, decreased appetite, diaphoresis, fever, malaise/fatigue, night sweats, weight gain and weight loss.   Eyes: Negative.    Cardiovascular:  Negative for chest pain, claudication, cyanosis, dyspnea on exertion, irregular heartbeat, leg swelling, near-syncope, orthopnea, palpitations, paroxysmal nocturnal dyspnea and syncope.   Respiratory:  Negative for cough, hemoptysis and shortness of breath.    Endocrine: Negative.    Hematologic/Lymphatic: Negative.    Skin:  Negative for color change, dry skin and nail changes.   Musculoskeletal: Negative.    Gastrointestinal: Negative.    Genitourinary: Negative.    Neurological:  Negative for weakness.       Objective:   Blood pressure (!) 88/0, pulse 80, temperature 97.8 °F (36.6 °C), temperature source Oral, height 6' 2" (1.88 m), weight 78.5 kg (173 lb).body mass index is 22.21 kg/m².    Doppler: 88 mm of Hg    Physical Exam  Vitals reviewed.   Constitutional:       Appearance: He is well-developed.      Comments: BP (!) 88/0 (BP Location: Left arm, Patient Position: Sitting) Comment (BP Method): doppler  Pulse 80   Temp 97.8 °F (36.6 °C) (Oral)   Ht 6' 2" (1.88 m)   Wt 78.5 kg (173 lb)   BMI 22.21 kg/m²      HENT:      Head: Normocephalic.   Neck:      Vascular: No carotid bruit or JVD.   Cardiovascular:      Heart sounds: No murmur " "heard.     Comments: Smooth VAD hum. DLES is "1"  Pulmonary:      Effort: Pulmonary effort is normal.      Breath sounds: Normal breath sounds.   Abdominal:      General: Bowel sounds are normal.      Palpations: Abdomen is soft.   Skin:     General: Skin is warm.   Neurological:      Mental Status: He is alert.         Lab Results   Component Value Date    WBC 6.57 04/11/2024    HGB 8.9 (L) 04/11/2024    HCT 28.5 (L) 04/11/2024    MCV 91 04/11/2024     04/11/2024    CO2 26 04/11/2024    CREATININE 4.1 (H) 04/11/2024    CALCIUM 9.5 04/11/2024    ALBUMIN 3.4 (L) 04/11/2024    AST 55 (H) 04/11/2024     (H) 04/11/2024    ALT 77 (H) 04/11/2024     (H) 03/27/2024       Lab Results   Component Value Date    INR 2.7 (H) 04/11/2024    INR 2.6 (H) 04/08/2024    INR 3.3 (H) 04/04/2024       BNP   Date Value Ref Range Status   04/11/2024 974 (H) 0 - 99 pg/mL Final     Comment:     Values of less than 100 pg/ml are consistent with non-CHF populations.   04/08/2024 430 (H) 0 - 99 pg/mL Final     Comment:     Values of less than 100 pg/ml are consistent with non-CHF populations.   04/04/2024 1,072 (H) 0 - 99 pg/mL Final     Comment:     Values of less than 100 pg/ml are consistent with non-CHF populations.       LD   Date Value Ref Range Status   03/27/2024 261 (H) 110 - 260 U/L Final     Comment:     Results are increased in hemolyzed samples.   03/26/2024 283 (H) 110 - 260 U/L Final     Comment:     Results are increased in hemolyzed samples.   03/25/2024 312 (H) 110 - 260 U/L Final     Comment:     Results are increased in hemolyzed samples.       Assessment:      1. Presence of ventricular assist device    2. Chronic combined systolic and diastolic heart failure    3. Coronary artery disease of native artery of native heart with stable angina pectoris    4. Ventricular tachycardia    5. Type 2 diabetes mellitus without complication, without long-term current use of insulin    6. PAF (paroxysmal atrial " fibrillation)    7. At risk for amiodarone toxicity with long term use        Plan:   Patient is now NYHA class II.Owing to his advanced kidney disease and significantly elevated creat, he is not on a  ACEI/ARB/ARNI or MRA. He is also not on a beta blocker due to RV dysfunction.   Continue current bumex dose and avoid metolazone for now. Repeat labs next week.    BP is controlled.  INR is therapeutic.   VAD interrogation was performed in clinic  Any VAD management kits dispensed today medically necessary  Recommend 2 gram sodium restriction and 1500cc fluid restriction.  Encourage physical activity with graded exercise program.  Requested patient to weigh themselves daily, and to notify us if their weight increases by more than 3 lbs in 1 day or 5 lbs in 1 week.   Additionally, the patient has a patient centered goal of being able to get stronger    Patient advised that it is recommended that all patients, and their close contacts and household members receive Covid vaccination.    Listed for transplant: No    UNOS Patient Status  Functional Status: 50% - Requires considerable assistance and frequent medical care  Physical Capacity: No Limitations  Working for Income: No  If no, reason not working: Demands of Treatment    Advance Care Planning     Date: 04/11/2024  Patient has ACP docs in file.     Brayan Ocampo MD

## 2024-04-11 NOTE — TELEPHONE ENCOUNTER
Called pt's wife Arlyn to schedule for ESRD tx choices class. Arlyn requested that the pt be seen for education on the day that they come to [place] for other appts (4/25). Appt made for 1pm on 4/25, as requested.

## 2024-04-11 NOTE — PROCEDURES
4/11/2024    12:23 PM 3/26/2024     3:43 AM 3/26/2024    12:23 AM 3/25/2024     8:11 PM 3/25/2024     4:27 AM 3/24/2024    11:21 PM 3/24/2024     7:49 PM   TXP PINA INTERROGATIONS   Type HeartMate3         Flow 3.4         Speed 5000         PI 8.5         Power (Carrington) 3.5         LSL 4600         Pulsatility No Pulse Intermittent pulse Intermittent pulse Intermittent pulse Pulse Pulse Pulse   }

## 2024-04-11 NOTE — LETTER
April 11, 2024        Kevin Franklin  [address] [place] [address]  Phone: [phone]  Fax: [phone]             [id number]  [address]  [facility] [address]  Phone: [phone]   Patient: Radha Abbott   MR Number: [id number]   YOB: 1960   Date of Visit: 4/11/2024       Dear Dr. Kevin Franklin    Thank you for referring Radha Abbott to me for evaluation. Attached you will find relevant portions of my assessment and plan of care.    If you have questions, please do not hesitate to call me. I look forward to following Radha Abbott along with you.    Sincerely,    Brayan Ocampo MD    Enclosure    If you would like to receive this communication electronically, please contact [email or web address] or [phone] to request [facility] [employer] access.    [facility] [employer] is a tool which provides read-only access to select patient information with whom you have a relationship. Its easy to use and provides real time access to review your patients record including encounter summaries, notes, results, and demographic information.    If you feel you have received this communication in error or would no longer like to receive these types of communications, please e-mail [email or web address]

## 2024-04-11 NOTE — PROGRESS NOTES
[facility]    2024 12:28 PM    Assessment/Plan:    Patient presents today with therapeutic INR.    Assessment of patient findings and chart review: no significant findings     Recommendation for patient's warfarin regimen: Continue current maintenance dose    Recommend repeat INR in 1 week  _________________________________________________________________    Emmanuelleaimee Abbott (63 y.o.) is followed by the [facility].    Anticoagulation Summary  As of 2024      INR goal:  2.0-3.0   TTR:  47.1 % (2 mo)   INR used for dosin.7 (2024)   Warfarin maintenance plan:  3.75 mg (2.5 mg x 1.5) every Tue, Thu; 2.5 mg (2.5 mg x 1) all other days   Weekly warfarin total:  20 mg   Plan last modified:  Joycelyn Angel, PharmD (2024)   Next INR check:  2024   Target end date:      Indications    LVAD (left ventricular assist device) present [Z95.811]                 Anticoagulation Episode Summary       INR check location:      Preferred lab:      Send INR reminders to:  [facility] COUMADIN LVAD    Comments:  [place]          Anticoagulation Care Providers       Provider Role Specialty Phone number    Sajan Hurley MD [facility] Card[facility] [phone]

## 2024-04-16 ENCOUNTER — HOSPITAL ENCOUNTER (INPATIENT)
Facility: HOSPITAL | Age: 64
LOS: 15 days | Discharge: HOME | DRG: 177 | End: 2024-05-01
Attending: EMERGENCY MEDICINE | Admitting: INTERNAL MEDICINE
Payer: MEDICAID

## 2024-04-16 DIAGNOSIS — N18.4 STAGE 4 CHRONIC KIDNEY DISEASE: ICD-10-CM

## 2024-04-16 DIAGNOSIS — U07.1 PNEUMONIA DUE TO COVID-19 VIRUS: ICD-10-CM

## 2024-04-16 DIAGNOSIS — Z95.811 LVAD (LEFT VENTRICULAR ASSIST DEVICE) PRESENT: ICD-10-CM

## 2024-04-16 DIAGNOSIS — N17.9 AKI (ACUTE KIDNEY INJURY): ICD-10-CM

## 2024-04-16 DIAGNOSIS — R34 OLIGURIA: ICD-10-CM

## 2024-04-16 DIAGNOSIS — I50.84 END STAGE HEART FAILURE: ICD-10-CM

## 2024-04-16 DIAGNOSIS — E11.9 TYPE 2 DIABETES MELLITUS WITHOUT COMPLICATION, WITHOUT LONG-TERM CURRENT USE OF INSULIN: ICD-10-CM

## 2024-04-16 DIAGNOSIS — R78.81 BACTEREMIA: ICD-10-CM

## 2024-04-16 DIAGNOSIS — J18.9 PNEUMONIA OF RIGHT LUNG DUE TO INFECTIOUS ORGANISM, UNSPECIFIED PART OF LUNG: Primary | ICD-10-CM

## 2024-04-16 DIAGNOSIS — R53.1 WEAKNESS: ICD-10-CM

## 2024-04-16 DIAGNOSIS — J12.82 PNEUMONIA DUE TO COVID-19 VIRUS: ICD-10-CM

## 2024-04-16 DIAGNOSIS — I10 HYPERTENSION, UNSPECIFIED TYPE: ICD-10-CM

## 2024-04-16 DIAGNOSIS — J18.9 PNEUMONIA: ICD-10-CM

## 2024-04-16 DIAGNOSIS — R94.31 PROLONGED QT INTERVAL: ICD-10-CM

## 2024-04-16 DIAGNOSIS — R50.9 FEVER: ICD-10-CM

## 2024-04-16 DIAGNOSIS — R50.9 FEVER AND CHILLS: ICD-10-CM

## 2024-04-16 DIAGNOSIS — I50.21 ACUTE SYSTOLIC HEART FAILURE: ICD-10-CM

## 2024-04-16 PROBLEM — A41.9 SEPSIS: Status: ACTIVE | Noted: 2024-04-16

## 2024-04-16 PROBLEM — R51.9 HEADACHE: Status: ACTIVE | Noted: 2024-04-16

## 2024-04-16 LAB
BASOPHILS # BLD AUTO: 0.02 K/UL (ref 0–0.2)
BASOPHILS NFR BLD: 0.3 % (ref 0–1.9)
DIFFERENTIAL METHOD BLD: ABNORMAL
EOSINOPHIL # BLD AUTO: 0 K/UL (ref 0–0.5)
EOSINOPHIL NFR BLD: 0.3 % (ref 0–8)
ERYTHROCYTE [DISTWIDTH] IN BLOOD BY AUTOMATED COUNT: 17.5 % (ref 11.5–14.5)
HCT VFR BLD AUTO: 27.1 % (ref 40–54)
HGB BLD-MCNC: 9.1 G/DL (ref 14–18)
IMM GRANULOCYTES # BLD AUTO: 0.05 K/UL (ref 0–0.04)
IMM GRANULOCYTES NFR BLD AUTO: 0.6 % (ref 0–0.5)
INR PPP: 2.2 (ref 0.8–1.2)
LYMPHOCYTES # BLD AUTO: 0.3 K/UL (ref 1–4.8)
LYMPHOCYTES NFR BLD: 4.3 % (ref 18–48)
MCH RBC QN AUTO: 29.1 PG (ref 27–31)
MCHC RBC AUTO-ENTMCNC: 33.6 G/DL (ref 32–36)
MCV RBC AUTO: 87 FL (ref 82–98)
MONOCYTES # BLD AUTO: 0.4 K/UL (ref 0.3–1)
MONOCYTES NFR BLD: 5.4 % (ref 4–15)
NEUTROPHILS # BLD AUTO: 7.1 K/UL (ref 1.8–7.7)
NEUTROPHILS NFR BLD: 89.1 % (ref 38–73)
NRBC BLD-RTO: 0 /100 WBC
PLATELET # BLD AUTO: 367 K/UL (ref 150–450)
PMV BLD AUTO: 10.2 FL (ref 9.2–12.9)
PROTHROMBIN TIME: 22.9 SEC (ref 9–12.5)
RBC # BLD AUTO: 3.13 M/UL (ref 4.6–6.2)
WBC # BLD AUTO: 7.93 K/UL (ref 3.9–12.7)

## 2024-04-16 PROCEDURE — 99223 1ST HOSP IP/OBS HIGH 75: CPT | Mod: ,,, | Performed by: INTERNAL MEDICINE

## 2024-04-16 PROCEDURE — 87502 INFLUENZA DNA AMP PROBE: CPT | Performed by: PHYSICIAN ASSISTANT

## 2024-04-16 PROCEDURE — 87040 BLOOD CULTURE FOR BACTERIA: CPT | Mod: 59 | Performed by: PHYSICIAN ASSISTANT

## 2024-04-16 PROCEDURE — 83605 ASSAY OF LACTIC ACID: CPT

## 2024-04-16 PROCEDURE — 87186 SC STD MICRODIL/AGAR DIL: CPT | Performed by: PHYSICIAN ASSISTANT

## 2024-04-16 PROCEDURE — 93005 ELECTROCARDIOGRAM TRACING: CPT

## 2024-04-16 PROCEDURE — 87154 CUL TYP ID BLD PTHGN 6+ TRGT: CPT | Performed by: PHYSICIAN ASSISTANT

## 2024-04-16 PROCEDURE — 99900035 HC TECH TIME PER 15 MIN (STAT)

## 2024-04-16 PROCEDURE — U0002 COVID-19 LAB TEST NON-CDC: HCPCS | Performed by: PHYSICIAN ASSISTANT

## 2024-04-16 PROCEDURE — 87077 CULTURE AEROBIC IDENTIFY: CPT | Performed by: PHYSICIAN ASSISTANT

## 2024-04-16 PROCEDURE — 93750 INTERROGATION VAD IN PERSON: CPT | Mod: ,,, | Performed by: INTERNAL MEDICINE

## 2024-04-16 PROCEDURE — 85610 PROTHROMBIN TIME: CPT | Performed by: PHYSICIAN ASSISTANT

## 2024-04-16 PROCEDURE — 85025 COMPLETE CBC W/AUTO DIFF WBC: CPT | Performed by: PHYSICIAN ASSISTANT

## 2024-04-16 PROCEDURE — 12000002 HC ACUTE/MED SURGE SEMI-PRIVATE ROOM

## 2024-04-16 PROCEDURE — 84145 PROCALCITONIN (PCT): CPT | Performed by: PHYSICIAN ASSISTANT

## 2024-04-16 PROCEDURE — 99285 EMERGENCY DEPT VISIT HI MDM: CPT | Mod: 25

## 2024-04-16 PROCEDURE — 93010 ELECTROCARDIOGRAM REPORT: CPT | Mod: ,,, | Performed by: INTERNAL MEDICINE

## 2024-04-16 PROCEDURE — 83615 LACTATE (LD) (LDH) ENZYME: CPT | Performed by: PHYSICIAN ASSISTANT

## 2024-04-16 PROCEDURE — 82803 BLOOD GASES ANY COMBINATION: CPT

## 2024-04-16 RX ORDER — BUMETANIDE 1 MG/1
4 TABLET ORAL
Status: COMPLETED | OUTPATIENT
Start: 2024-04-16 | End: 2024-04-17

## 2024-04-16 RX ORDER — GABAPENTIN 100 MG/1
100 CAPSULE ORAL
Status: COMPLETED | OUTPATIENT
Start: 2024-04-16 | End: 2024-04-17

## 2024-04-16 RX ORDER — SODIUM BICARBONATE 325 MG/1
325 TABLET ORAL
Status: COMPLETED | OUTPATIENT
Start: 2024-04-16 | End: 2024-04-17

## 2024-04-16 RX ORDER — HYDRALAZINE HYDROCHLORIDE 25 MG/1
25 TABLET, FILM COATED ORAL
Status: COMPLETED | OUTPATIENT
Start: 2024-04-16 | End: 2024-04-17

## 2024-04-16 NOTE — NURSING
Rounded on patient. Patient asleep in bed, wife at bedside. Patient did undergo HD today which tires him out per wife. I discussed a VAD check off session tomorrow with patient and wife. Plan for 10:00 tomorrow for VAD check off.   16-Apr-2024 12:27

## 2024-04-17 ENCOUNTER — TELEPHONE (OUTPATIENT)
Dept: TRANSPLANT | Facility: CLINIC | Age: 64
End: 2024-04-17
Payer: MEDICAID

## 2024-04-17 PROBLEM — J12.82 PNEUMONIA DUE TO COVID-19 VIRUS: Status: ACTIVE | Noted: 2024-04-16

## 2024-04-17 PROBLEM — I10 HYPERTENSION: Status: ACTIVE | Noted: 2024-04-17

## 2024-04-17 PROBLEM — U07.1 PNEUMONIA DUE TO COVID-19 VIRUS: Status: ACTIVE | Noted: 2024-04-16

## 2024-04-17 PROBLEM — R53.1 WEAKNESS: Status: ACTIVE | Noted: 2024-04-17

## 2024-04-17 PROBLEM — J18.9 PNEUMONIA: Status: ACTIVE | Noted: 2024-04-16

## 2024-04-17 PROBLEM — R78.81 BACTEREMIA: Status: ACTIVE | Noted: 2024-04-17

## 2024-04-17 PROBLEM — E87.1 HYPONATREMIA: Status: ACTIVE | Noted: 2024-04-17

## 2024-04-17 LAB
ACINETOBACTER CALCOACETICUS/BAUMANNII COMPLEX: NOT DETECTED
ALBUMIN SERPL BCP-MCNC: 3 G/DL (ref 3.5–5.2)
ALBUMIN SERPL BCP-MCNC: 3.2 G/DL (ref 3.5–5.2)
ALP SERPL-CCNC: 127 U/L (ref 55–135)
ALP SERPL-CCNC: 143 U/L (ref 55–135)
ALT SERPL W/O P-5'-P-CCNC: 101 U/L (ref 10–44)
ALT SERPL W/O P-5'-P-CCNC: 88 U/L (ref 10–44)
ANION GAP SERPL CALC-SCNC: 15 MMOL/L (ref 8–16)
ANION GAP SERPL CALC-SCNC: 16 MMOL/L (ref 8–16)
APTT PPP: 43.6 SEC (ref 21–32)
AST SERPL-CCNC: 76 U/L (ref 10–40)
AST SERPL-CCNC: 88 U/L (ref 10–40)
BACTEROIDES FRAGILIS: NOT DETECTED
BASOPHILS # BLD AUTO: 0.02 K/UL (ref 0–0.2)
BASOPHILS NFR BLD: 0.3 % (ref 0–1.9)
BILIRUB DIRECT SERPL-MCNC: 0.2 MG/DL (ref 0.1–0.3)
BILIRUB SERPL-MCNC: 0.4 MG/DL (ref 0.1–1)
BILIRUB SERPL-MCNC: 0.5 MG/DL (ref 0.1–1)
BILIRUB UR QL STRIP: NEGATIVE
BILIRUB UR QL STRIP: NEGATIVE
BNP SERPL-MCNC: 856 PG/ML (ref 0–99)
BUN SERPL-MCNC: 76 MG/DL (ref 8–23)
BUN SERPL-MCNC: 82 MG/DL (ref 8–23)
CALCIUM SERPL-MCNC: 8.9 MG/DL (ref 8.7–10.5)
CALCIUM SERPL-MCNC: 9.5 MG/DL (ref 8.7–10.5)
CANDIDA ALBICANS: NOT DETECTED
CANDIDA AURIS: NOT DETECTED
CANDIDA GLABRATA: NOT DETECTED
CANDIDA KRUSEI: NOT DETECTED
CANDIDA PARAPSILOSIS: NOT DETECTED
CANDIDA TROPICALIS: NOT DETECTED
CHLORIDE SERPL-SCNC: 94 MMOL/L (ref 95–110)
CHLORIDE SERPL-SCNC: 95 MMOL/L (ref 95–110)
CLARITY UR REFRACT.AUTO: CLEAR
CLARITY UR REFRACT.AUTO: CLEAR
CO2 SERPL-SCNC: 23 MMOL/L (ref 23–29)
CO2 SERPL-SCNC: 25 MMOL/L (ref 23–29)
COLOR UR AUTO: YELLOW
COLOR UR AUTO: YELLOW
CREAT SERPL-MCNC: 4.3 MG/DL (ref 0.5–1.4)
CREAT SERPL-MCNC: 4.3 MG/DL (ref 0.5–1.4)
CRP SERPL-MCNC: 126.6 MG/L (ref 0–8.2)
CRYPTOCOCCUS NEOFORMANS/GATTII: NOT DETECTED
CTX-M GENE (ESBL PRODUCER): ABNORMAL
DIFFERENTIAL METHOD BLD: ABNORMAL
ENTEROBACTER CLOACAE COMPLEX: NOT DETECTED
ENTEROBACTERALES: NOT DETECTED
ENTEROCOCCUS FAECALIS: DETECTED
ENTEROCOCCUS FAECIUM: NOT DETECTED
EOSINOPHIL # BLD AUTO: 0 K/UL (ref 0–0.5)
EOSINOPHIL NFR BLD: 0.3 % (ref 0–8)
ERYTHROCYTE [DISTWIDTH] IN BLOOD BY AUTOMATED COUNT: 16.6 % (ref 11.5–14.5)
ESCHERICHIA COLI: NOT DETECTED
EST. GFR  (NO RACE VARIABLE): 14.8 ML/MIN/1.73 M^2
EST. GFR  (NO RACE VARIABLE): 14.8 ML/MIN/1.73 M^2
ESTIMATED AVG GLUCOSE: 166 MG/DL (ref 68–131)
GLUCOSE SERPL-MCNC: 117 MG/DL (ref 70–110)
GLUCOSE SERPL-MCNC: 179 MG/DL (ref 70–110)
GLUCOSE UR QL STRIP: NEGATIVE
GLUCOSE UR QL STRIP: NEGATIVE
HAEMOPHILUS INFLUENZAE: NOT DETECTED
HBA1C MFR BLD: 7.4 % (ref 4–5.6)
HCT VFR BLD AUTO: 29.6 % (ref 40–54)
HGB BLD-MCNC: 9.3 G/DL (ref 14–18)
HGB UR QL STRIP: NEGATIVE
HGB UR QL STRIP: NEGATIVE
IMM GRANULOCYTES # BLD AUTO: 0.03 K/UL (ref 0–0.04)
IMM GRANULOCYTES NFR BLD AUTO: 0.4 % (ref 0–0.5)
IMP GENE (CARBAPENEM RESISTANT): ABNORMAL
INFLUENZA A, MOLECULAR: NEGATIVE
INFLUENZA B, MOLECULAR: NEGATIVE
INR PPP: 2.3 (ref 0.8–1.2)
KETONES UR QL STRIP: NEGATIVE
KETONES UR QL STRIP: NEGATIVE
KLEBSIELLA AEROGENES: NOT DETECTED
KLEBSIELLA OXYTOCA: NOT DETECTED
KLEBSIELLA PNEUMONIAE GROUP: NOT DETECTED
KPC RESISTANCE GENE (CARBAPENEM): ABNORMAL
LDH SERPL L TO P-CCNC: 449 U/L (ref 110–260)
LDH SERPL L TO P-CCNC: 461 U/L (ref 110–260)
LEUKOCYTE ESTERASE UR QL STRIP: NEGATIVE
LEUKOCYTE ESTERASE UR QL STRIP: NEGATIVE
LISTERIA MONOCYTOGENES: NOT DETECTED
LYMPHOCYTES # BLD AUTO: 0.4 K/UL (ref 1–4.8)
LYMPHOCYTES NFR BLD: 5.6 % (ref 18–48)
MAGNESIUM SERPL-MCNC: 1.9 MG/DL (ref 1.6–2.6)
MCH RBC QN AUTO: 27.8 PG (ref 27–31)
MCHC RBC AUTO-ENTMCNC: 31.4 G/DL (ref 32–36)
MCR-1: ABNORMAL
MCV RBC AUTO: 89 FL (ref 82–98)
MEC A/C AND MREJ (MRSA): ABNORMAL
MEC A/C: ABNORMAL
MONOCYTES # BLD AUTO: 0.4 K/UL (ref 0.3–1)
MONOCYTES NFR BLD: 5.5 % (ref 4–15)
NDM GENE (CARBAPENEM RESISTANT): ABNORMAL
NEISSERIA MENINGITIDIS: NOT DETECTED
NEUTROPHILS # BLD AUTO: 6.1 K/UL (ref 1.8–7.7)
NEUTROPHILS NFR BLD: 87.9 % (ref 38–73)
NITRITE UR QL STRIP: NEGATIVE
NITRITE UR QL STRIP: NEGATIVE
NRBC BLD-RTO: 0 /100 WBC
OHS QRS DURATION: 180 MS
OHS QTC CALCULATION: 558 MS
OXA-48-LIKE (CARBAPENEM RESISTANT): ABNORMAL
PH UR STRIP: 7 [PH] (ref 5–8)
PH UR STRIP: 7 [PH] (ref 5–8)
PHOSPHATE SERPL-MCNC: 3.9 MG/DL (ref 2.7–4.5)
PLATELET # BLD AUTO: 366 K/UL (ref 150–450)
PMV BLD AUTO: 11 FL (ref 9.2–12.9)
POCT GLUCOSE: 102 MG/DL (ref 70–110)
POCT GLUCOSE: 145 MG/DL (ref 70–110)
POCT GLUCOSE: 154 MG/DL (ref 70–110)
POCT GLUCOSE: 190 MG/DL (ref 70–110)
POTASSIUM SERPL-SCNC: 3.9 MMOL/L (ref 3.5–5.1)
POTASSIUM SERPL-SCNC: 4 MMOL/L (ref 3.5–5.1)
PREALB SERPL-MCNC: 20 MG/DL (ref 20–43)
PROCALCITONIN SERPL IA-MCNC: 0.59 NG/ML
PROT SERPL-MCNC: 7.7 G/DL (ref 6–8.4)
PROT SERPL-MCNC: 8.2 G/DL (ref 6–8.4)
PROT UR QL STRIP: ABNORMAL
PROT UR QL STRIP: ABNORMAL
PROTEUS SPECIES: NOT DETECTED
PROTHROMBIN TIME: 23.1 SEC (ref 9–12.5)
PSEUDOMONAS AERUGINOSA: NOT DETECTED
RBC # BLD AUTO: 3.34 M/UL (ref 4.6–6.2)
SALMONELLA SP: NOT DETECTED
SARS-COV-2 RDRP RESP QL NAA+PROBE: POSITIVE
SERRATIA MARCESCENS: NOT DETECTED
SODIUM SERPL-SCNC: 134 MMOL/L (ref 136–145)
SODIUM SERPL-SCNC: 134 MMOL/L (ref 136–145)
SP GR UR STRIP: 1.01 (ref 1–1.03)
SP GR UR STRIP: 1.01 (ref 1–1.03)
SPECIMEN SOURCE: NORMAL
STAPHYLOCOCCUS AUREUS: NOT DETECTED
STAPHYLOCOCCUS EPIDERMIDIS: NOT DETECTED
STAPHYLOCOCCUS LUGDUNESIS: NOT DETECTED
STAPHYLOCOCCUS SPECIES: NOT DETECTED
STENOTROPHOMONAS MALTOPHILIA: NOT DETECTED
STREPTOCOCCUS AGALACTIAE: NOT DETECTED
STREPTOCOCCUS PNEUMONIAE: NOT DETECTED
STREPTOCOCCUS PYOGENES: NOT DETECTED
STREPTOCOCCUS SPECIES: NOT DETECTED
URN SPEC COLLECT METH UR: ABNORMAL
URN SPEC COLLECT METH UR: ABNORMAL
VAN A/B (VRE GENE): NOT DETECTED
VIM GENE (CARBAPENEM RESISTANT): ABNORMAL
WBC # BLD AUTO: 6.92 K/UL (ref 3.9–12.7)

## 2024-04-17 PROCEDURE — 83036 HEMOGLOBIN GLYCOSYLATED A1C: CPT | Performed by: NURSE PRACTITIONER

## 2024-04-17 PROCEDURE — 83735 ASSAY OF MAGNESIUM: CPT | Performed by: STUDENT IN AN ORGANIZED HEALTH CARE EDUCATION/TRAINING PROGRAM

## 2024-04-17 PROCEDURE — 25000003 PHARM REV CODE 250: Performed by: PHYSICIAN ASSISTANT

## 2024-04-17 PROCEDURE — 81003 URINALYSIS AUTO W/O SCOPE: CPT | Performed by: PHYSICIAN ASSISTANT

## 2024-04-17 PROCEDURE — 93750 INTERROGATION VAD IN PERSON: CPT | Mod: ,,, | Performed by: INTERNAL MEDICINE

## 2024-04-17 PROCEDURE — 99233 SBSQ HOSP IP/OBS HIGH 50: CPT | Mod: ,,, | Performed by: NURSE PRACTITIONER

## 2024-04-17 PROCEDURE — 80053 COMPREHEN METABOLIC PANEL: CPT | Performed by: INTERNAL MEDICINE

## 2024-04-17 PROCEDURE — 86140 C-REACTIVE PROTEIN: CPT | Performed by: STUDENT IN AN ORGANIZED HEALTH CARE EDUCATION/TRAINING PROGRAM

## 2024-04-17 PROCEDURE — 97535 SELF CARE MNGMENT TRAINING: CPT

## 2024-04-17 PROCEDURE — 4B02XTZ MEASUREMENT OF CARDIAC DEFIBRILLATOR, EXTERNAL APPROACH: ICD-10-PCS | Performed by: INTERNAL MEDICINE

## 2024-04-17 PROCEDURE — 99223 1ST HOSP IP/OBS HIGH 75: CPT | Mod: ,,, | Performed by: INTERNAL MEDICINE

## 2024-04-17 PROCEDURE — 25000003 PHARM REV CODE 250: Performed by: STUDENT IN AN ORGANIZED HEALTH CARE EDUCATION/TRAINING PROGRAM

## 2024-04-17 PROCEDURE — 27000248 HC VAD-ADDITIONAL DAY

## 2024-04-17 PROCEDURE — 84100 ASSAY OF PHOSPHORUS: CPT | Performed by: STUDENT IN AN ORGANIZED HEALTH CARE EDUCATION/TRAINING PROGRAM

## 2024-04-17 PROCEDURE — 63600175 PHARM REV CODE 636 W HCPCS: Performed by: NURSE PRACTITIONER

## 2024-04-17 PROCEDURE — 20600001 HC STEP DOWN PRIVATE ROOM

## 2024-04-17 PROCEDURE — 99222 1ST HOSP IP/OBS MODERATE 55: CPT | Mod: ,,, | Performed by: NURSE PRACTITIONER

## 2024-04-17 PROCEDURE — 80076 HEPATIC FUNCTION PANEL: CPT | Performed by: STUDENT IN AN ORGANIZED HEALTH CARE EDUCATION/TRAINING PROGRAM

## 2024-04-17 PROCEDURE — 27000207 HC ISOLATION

## 2024-04-17 PROCEDURE — 36415 COLL VENOUS BLD VENIPUNCTURE: CPT | Performed by: NURSE PRACTITIONER

## 2024-04-17 PROCEDURE — 84134 ASSAY OF PREALBUMIN: CPT | Performed by: STUDENT IN AN ORGANIZED HEALTH CARE EDUCATION/TRAINING PROGRAM

## 2024-04-17 PROCEDURE — 25000003 PHARM REV CODE 250: Performed by: INTERNAL MEDICINE

## 2024-04-17 PROCEDURE — 85025 COMPLETE CBC W/AUTO DIFF WBC: CPT | Performed by: STUDENT IN AN ORGANIZED HEALTH CARE EDUCATION/TRAINING PROGRAM

## 2024-04-17 PROCEDURE — 25000003 PHARM REV CODE 250: Performed by: NURSE PRACTITIONER

## 2024-04-17 PROCEDURE — 63600175 PHARM REV CODE 636 W HCPCS: Performed by: INTERNAL MEDICINE

## 2024-04-17 PROCEDURE — 85610 PROTHROMBIN TIME: CPT | Performed by: STUDENT IN AN ORGANIZED HEALTH CARE EDUCATION/TRAINING PROGRAM

## 2024-04-17 PROCEDURE — 85730 THROMBOPLASTIN TIME PARTIAL: CPT | Performed by: STUDENT IN AN ORGANIZED HEALTH CARE EDUCATION/TRAINING PROGRAM

## 2024-04-17 PROCEDURE — 97165 OT EVAL LOW COMPLEX 30 MIN: CPT

## 2024-04-17 PROCEDURE — 36415 COLL VENOUS BLD VENIPUNCTURE: CPT | Performed by: STUDENT IN AN ORGANIZED HEALTH CARE EDUCATION/TRAINING PROGRAM

## 2024-04-17 PROCEDURE — 97116 GAIT TRAINING THERAPY: CPT

## 2024-04-17 PROCEDURE — 83880 ASSAY OF NATRIURETIC PEPTIDE: CPT | Performed by: STUDENT IN AN ORGANIZED HEALTH CARE EDUCATION/TRAINING PROGRAM

## 2024-04-17 PROCEDURE — 83615 LACTATE (LD) (LDH) ENZYME: CPT | Performed by: STUDENT IN AN ORGANIZED HEALTH CARE EDUCATION/TRAINING PROGRAM

## 2024-04-17 PROCEDURE — 97161 PT EVAL LOW COMPLEX 20 MIN: CPT

## 2024-04-17 PROCEDURE — 80048 BASIC METABOLIC PNL TOTAL CA: CPT | Mod: XB | Performed by: STUDENT IN AN ORGANIZED HEALTH CARE EDUCATION/TRAINING PROGRAM

## 2024-04-17 RX ORDER — GABAPENTIN 100 MG/1
100 CAPSULE ORAL ONCE
Status: COMPLETED | OUTPATIENT
Start: 2024-04-17 | End: 2024-04-17

## 2024-04-17 RX ORDER — ACETAMINOPHEN 500 MG
1000 TABLET ORAL EVERY 8 HOURS PRN
Status: DISCONTINUED | OUTPATIENT
Start: 2024-04-17 | End: 2024-05-01 | Stop reason: HOSPADM

## 2024-04-17 RX ORDER — ONDANSETRON HYDROCHLORIDE 2 MG/ML
4 INJECTION, SOLUTION INTRAVENOUS ONCE
Status: DISCONTINUED | OUTPATIENT
Start: 2024-04-17 | End: 2024-04-24

## 2024-04-17 RX ORDER — AMOXICILLIN 250 MG
2 CAPSULE ORAL DAILY PRN
Status: DISCONTINUED | OUTPATIENT
Start: 2024-04-17 | End: 2024-05-01 | Stop reason: HOSPADM

## 2024-04-17 RX ORDER — VENLAFAXINE 37.5 MG/1
37.5 TABLET ORAL DAILY
Status: DISCONTINUED | OUTPATIENT
Start: 2024-04-17 | End: 2024-05-01 | Stop reason: HOSPADM

## 2024-04-17 RX ORDER — IBUPROFEN 200 MG
16 TABLET ORAL
Status: DISCONTINUED | OUTPATIENT
Start: 2024-04-17 | End: 2024-05-01 | Stop reason: HOSPADM

## 2024-04-17 RX ORDER — AMIODARONE HYDROCHLORIDE 200 MG/1
400 TABLET ORAL DAILY
Status: DISCONTINUED | OUTPATIENT
Start: 2024-04-17 | End: 2024-05-01 | Stop reason: HOSPADM

## 2024-04-17 RX ORDER — BUMETANIDE 1 MG/1
4 TABLET ORAL 2 TIMES DAILY
Status: DISCONTINUED | OUTPATIENT
Start: 2024-04-17 | End: 2024-04-19

## 2024-04-17 RX ORDER — INSULIN ASPART 100 [IU]/ML
0-5 INJECTION, SOLUTION INTRAVENOUS; SUBCUTANEOUS
Status: DISCONTINUED | OUTPATIENT
Start: 2024-04-17 | End: 2024-05-01 | Stop reason: HOSPADM

## 2024-04-17 RX ORDER — WARFARIN 2.5 MG/1
2.5 TABLET ORAL
Status: DISCONTINUED | OUTPATIENT
Start: 2024-04-17 | End: 2024-04-18

## 2024-04-17 RX ORDER — FAMOTIDINE 20 MG/1
20 TABLET, FILM COATED ORAL DAILY
Status: DISCONTINUED | OUTPATIENT
Start: 2024-04-17 | End: 2024-05-01 | Stop reason: HOSPADM

## 2024-04-17 RX ORDER — IBUPROFEN 200 MG
24 TABLET ORAL
Status: DISCONTINUED | OUTPATIENT
Start: 2024-04-17 | End: 2024-05-01 | Stop reason: HOSPADM

## 2024-04-17 RX ORDER — GLUCAGON 1 MG
1 KIT INJECTION
Status: DISCONTINUED | OUTPATIENT
Start: 2024-04-17 | End: 2024-05-01 | Stop reason: HOSPADM

## 2024-04-17 RX ORDER — SODIUM BICARBONATE 325 MG/1
325 TABLET ORAL 3 TIMES DAILY
Status: DISCONTINUED | OUTPATIENT
Start: 2024-04-17 | End: 2024-05-01 | Stop reason: HOSPADM

## 2024-04-17 RX ORDER — LANOLIN ALCOHOL/MO/W.PET/CERES
400 CREAM (GRAM) TOPICAL DAILY
Status: DISCONTINUED | OUTPATIENT
Start: 2024-04-17 | End: 2024-05-01 | Stop reason: HOSPADM

## 2024-04-17 RX ADMIN — HYDRALAZINE HYDROCHLORIDE 25 MG: 25 TABLET ORAL at 12:04

## 2024-04-17 RX ADMIN — ACETAMINOPHEN 1000 MG: 500 TABLET ORAL at 08:04

## 2024-04-17 RX ADMIN — SODIUM BICARBONATE 325 MG: 325 TABLET ORAL at 02:04

## 2024-04-17 RX ADMIN — BUMETANIDE 4 MG: 1 TABLET ORAL at 12:04

## 2024-04-17 RX ADMIN — HYDRALAZINE HYDROCHLORIDE 75 MG: 50 TABLET ORAL at 08:04

## 2024-04-17 RX ADMIN — VANCOMYCIN HYDROCHLORIDE 1500 MG: 1.5 INJECTION, POWDER, LYOPHILIZED, FOR SOLUTION INTRAVENOUS at 11:04

## 2024-04-17 RX ADMIN — ACETAMINOPHEN 1000 MG: 500 TABLET ORAL at 09:04

## 2024-04-17 RX ADMIN — PIPERACILLIN SODIUM AND TAZOBACTAM SODIUM 4.5 G: 4; .5 INJECTION, POWDER, FOR SOLUTION INTRAVENOUS at 08:04

## 2024-04-17 RX ADMIN — HYDRALAZINE HYDROCHLORIDE 75 MG: 50 TABLET ORAL at 02:04

## 2024-04-17 RX ADMIN — Medication 400 MG: at 08:04

## 2024-04-17 RX ADMIN — AMIODARONE HYDROCHLORIDE 400 MG: 200 TABLET ORAL at 08:04

## 2024-04-17 RX ADMIN — HYDRALAZINE HYDROCHLORIDE 75 MG: 50 TABLET ORAL at 06:04

## 2024-04-17 RX ADMIN — GABAPENTIN 100 MG: 100 CAPSULE ORAL at 12:04

## 2024-04-17 RX ADMIN — TRAZODONE HYDROCHLORIDE 25 MG: 50 TABLET ORAL at 01:04

## 2024-04-17 RX ADMIN — VENLAFAXINE 37.5 MG: 37.5 TABLET ORAL at 08:04

## 2024-04-17 RX ADMIN — TRAZODONE HYDROCHLORIDE 25 MG: 50 TABLET ORAL at 08:04

## 2024-04-17 RX ADMIN — SODIUM BICARBONATE 325 MG: 325 TABLET ORAL at 08:04

## 2024-04-17 RX ADMIN — GABAPENTIN 100 MG: 100 CAPSULE ORAL at 08:04

## 2024-04-17 RX ADMIN — BUMETANIDE 4 MG: 1 TABLET ORAL at 08:04

## 2024-04-17 RX ADMIN — SODIUM BICARBONATE 325 MG: 325 TABLET ORAL at 12:04

## 2024-04-17 RX ADMIN — FAMOTIDINE 20 MG: 20 TABLET ORAL at 08:04

## 2024-04-17 RX ADMIN — WARFARIN SODIUM 2.5 MG: 2.5 TABLET ORAL at 05:04

## 2024-04-17 NOTE — PLAN OF CARE
VSS. BG monitored. LVAD number and doppler WNL. LVAD dressing CDI. Spouse at bedside. Pt educated on fall risk and remained free from falls/trauma/injury. Denies chest pain, SOB, palpitations, dizziness, pain, or discomfort. Plan of care reviewed with pt, all questions answered. Bed locked in lowest position, call bell within reach, no acute distress noted, will continue to monitor.

## 2024-04-17 NOTE — TELEPHONE ENCOUNTER
Paged by patients spouse. Reports fever 101.5 after tylenol. Temperature earlier today was 100.7. Denies DLES drainage, redness or swelling in abdomen,  nausea, vomiting, diarrhea. Denies sick contacts. Reports he has been weak, requiring assistance to stand. Also endorses 5lb weight gain in past 4 days. Advised to report to ER for evaluation. Spouse states she does not want to take him to local ER, [place] [facility] On call provider, Dr Ocampo, notified. Also notified ER charge nurse and ER cardiology fellow.

## 2024-04-17 NOTE — PROGRESS NOTES
"[facility] - Cardiology Stepdown  Adult Nutrition  Progress Note    SUMMARY       Recommendations    1.) Recommend continuing with Renal/Coumadin diet, fluid per MD.      - consider liberalizing diet as renal labs improve, with goal of Cardiac/Coumadin diet.     2.) If PO intake <50%, recommend Boost Plus BID to help meet increased needs.     3.) RD to monitor.       Goals: to meet % of EEN/EPN by next RD f/u  Nutrition Goal Status: new  Communication of RD Recs:  (POC)    Assessment and Plan    Nutrition Problem  Increased PRO/Kcal needs    Related to (etiology):   Increased physiological needs    Signs and Symptoms (as evidenced by):   LVAD present/pneumonia     Interventions/Recommendations (treatment strategy):  Collaboration of nutritional care with other providers.     Nutrition Diagnosis Status:   New    Reason for Assessment    Reason For Assessment: consult  Diagnosis: infection/sepsis  Relevant Medical History: CAD, CHF s/p LVAD, DM2  Interdisciplinary Rounds: did not attend    General Information Comments: RD consulted for nutritional assessment.  Pt was not seen d/t droplet/airborne precautions. Per chart review, pt -175#, #. Pt is at risk for malnutrition d/t covid +. RD to perform NFPE at more appropriate time. RD team to continue to monitor.     Nutrition Discharge Planning: adequate nutritional intake    Nutrition Risk Screen    Nutrition Risk Screen: no indicators present    Nutrition/Diet History    Spiritual, Cultural Beliefs, Yazidi Practices, Values that Affect Care: no    Anthropometrics    Temp: 99.1 °F (37.3 °C)  Height: 6' 2" (188 cm)  Height (inches): 74 in  Weight Method: Standard Scale  Weight: 76.7 kg (169 lb 1.5 oz)  Weight (lb): 169.09 lb  Ideal Body Weight (IBW), Male: 190 lb  % Ideal Body Weight, Male (lb): 90.51 %  BMI (Calculated): 21.7    Lab/Procedures/Meds    Pertinent Labs Reviewed: reviewed  Pertinent Labs Comments: Na: 134, BUN: 82, cr: 4.3, GFR: 14.8, " gluc: 117, ALP: 143, alb: 3.2, AST: 88, ALT: 101, CRP: 126.6    Pertinent Medications Reviewed: reviewed  Pertinent Medications Comments: Amiodarone, bumetanide, famotidine, hydralazine, MOM, abx, coumadin, trazodone    Estimated/Assessed Needs    Weight Used For Calorie Calculations: 78 kg (171 lb 15.3 oz)  Energy Calorie Requirements (kcal): 1950- 2340 kcal  Energy Need Method: Kcal/kg (25-30 kcal/kg)    Protein Requirements: 94- 109 g (1.2-1.4g/kg)  Weight Used For Protein Calculations: 78 kg (171 lb 15.3 oz)    Fluid Requirements (mL): 1ml/1kcal or per MD  Estimated Fluid Requirement Method: RDA Method  RDA Method (mL): 1950    CHO Requirement: 244- 293 g    Nutrition Prescription Ordered    Current Diet Order: Renal/Coumadin (1.5L FR)    Evaluation of Received Nutrient/Fluid Intake    I/O: incomplete  Fluid Required:  (as per MD)  Comments: LBM 4/15  Tolerance: tolerating  % Intake of Estimated Energy Needs: 0 - 25 %  % Meal Intake: 0 - 25 %    Nutrition Risk    Level of Risk/Frequency of Follow-up:  (RD to f/u x 1-2/week)     Monitor and Evaluation    Food and Nutrient Intake: energy intake, food and beverage intake  Food and Nutrient Adminstration: diet order  Knowledge/Beliefs/Attitudes: food and nutrition knowledge/skill  Physical Activity and Function: nutrition-related ADLs and IADLs  Anthropometric Measurements: weight, weight change, body mass index  Biochemical Data, Medical Tests and Procedures: electrolyte and renal panel, gastrointestinal profile, glucose/endocrine profile, inflammatory profile, lipid profile  Nutrition-Focused Physical Findings: overall appearance, skin     Nutrition Follow-Up    RD Follow-up?: Yes

## 2024-04-17 NOTE — PLAN OF CARE
PT evaluation completed- see note for details. PT POC and goals established.    Problem: Physical Therapy  Goal: Physical Therapy Goal  Description: Goals to be met by: 24     Patient will increase functional independence with mobility by performin. Supine to sit with Modified Finney  2. Sit to stand transfer with Modified Finney  3. Bed to chair transfer with Modified Finney using LRAD  4. Gait  x 150 feet with Supervision using LRAD.   5. Lower extremity exercise program x15 reps per handout, with independence    Outcome: Ongoing, Progressing

## 2024-04-17 NOTE — CARE UPDATE
I have reviewed the chart of Radha Abbott who is hospitalized for the following:    Active Hospital Problems    Diagnosis    *Pneumonia due to COVID-19 virus    Hypertension    Weakness    Hyponatremia     POA  Daily labs      Headache    Stage 4 chronic kidney disease     POA  Cr at baseline      LVAD (left ventricular assist device) present    HFrEF (heart failure with reduced ejection fraction)    Ventricular tachycardia    Type 2 diabetes mellitus without complication, without long-term current use of insulin        Larissa Blackwell NP  [facility]

## 2024-04-17 NOTE — PROCEDURES
Interrogation of Ventricular assist device was performed with physician analysis of device parameters and review of device function. I have personally reviewed the interrogation findings and agree with findings as stated.   Procedure: Device Interrogation Including analysis of device parameters  Current Settings: Ventricular Assist Device  Review of device function is stable      4/17/2024     4:00 PM 4/17/2024    12:03 PM 4/17/2024     8:00 AM 4/17/2024     4:30 AM 4/17/2024     1:50 AM 4/16/2024    10:51 PM 4/11/2024    12:23 PM   TXP LVAD INTERROGATIONS   Type HeartMate3 HeartMate3 HeartMate3 HeartMate3 HeartMate3 HeartMate3    Flow 4.1 4.3 4.1 3.8 3.9 4.1    Speed 5000 5000 5000 5000 5000 5050    PI 4.3 4.6 4.5 6.3 5.7 4.1    Power (Carrington) 3.4 3.5 3.4 3.4 3.4 3.4    LSL 4600 4600 4600 4600 4600     Pulsatility Pulse Intermittent pulse Pulse Intermittent pulse Intermittent pulse Pulse No Pulse      Brayan Ocampo MD

## 2024-04-17 NOTE — PLAN OF CARE
OT eval complete. OT POC and goals established.   Problem: Occupational Therapy  Goal: Occupational Therapy Goal  Description: Goals to be met by: 5/17/24     Patient will increase functional independence with ADLs by performing:    UE Dressing with Modified [place].  LE Dressing with Modified [place].  Grooming while standing at sink with Modified [place].  Toileting from toilet/bedside commode with Modified [place] for hygiene and clothing management.   Toilet transfer to toilet/bedside commode with Modified [place].    Outcome: Ongoing, Progressing

## 2024-04-17 NOTE — SUBJECTIVE & OBJECTIVE
Interval History: Feels weak and cough is productive of yellow sputum. Temp max since admit 99.8. O2 sats 100% on RA. Euvolemic on exam. Starting Vanc and Zosyn for new RML opacity on CT c/a/p and consulting ID. Blood cxs with NGTD    Continuous Infusions:  Current Facility-Administered Medications   Medication Dose Route Frequency Provider Last Rate Last Admin    acetaminophen tablet 1,000 mg  1,000 mg Oral Q8H PRN Lucy Brand MD   1,000 mg at 04/17/24 0959    amiodarone tablet 400 mg  400 mg Oral Daily Lucy Brand MD   400 mg at 04/17/24 0845    bumetanide tablet 4 mg  4 mg Oral BID Lucy Brand MD   4 mg at 04/17/24 0845    dextrose 10% bolus 125 mL 125 mL  12.5 g Intravenous PRN Erasmo Parrish DNP, COREEN        dextrose 10% bolus 250 mL 250 mL  25 g Intravenous PRN Erasmo Parrish DNP, COREEN        famotidine tablet 20 mg  20 mg Oral Daily Lucy Brand MD   20 mg at 04/17/24 0845    glucagon (human recombinant) injection 1 mg  1 mg Intramuscular PRN Erasmo Parrish DNP, COREEN        glucose chewable tablet 16 g  16 g Oral PRN Erasmo Parrish DNP, COREEN        glucose chewable tablet 24 g  24 g Oral PRN Erasmo Parrish DNP, COREEN        hydrALAZINE tablet 75 mg  75 mg Oral Q8H Lucy Brand MD   75 mg at 04/17/24 0657    insulin aspart U-100 pen 0-5 Units  0-5 Units Subcutaneous QID (AC + HS) PRN Erasmo Parrish DNP, COREEN        magnesium oxide tablet 400 mg  400 mg Oral Daily Lucy Brand MD   400 mg at 04/17/24 0845    piperacillin-tazobactam (ZOSYN) 4.5 g in dextrose 5 % in water (D5W) 100 mL IVPB (MB+)  4.5 g Intravenous Q12H Fany Abbasi NP 25 mL/hr at 04/17/24 0830 4.5 g at 04/17/24 0830    senna-docusate 8.6-50 mg per tablet 2 tablet  2 tablet Oral Daily PRN Lucy Brand MD        sodium bicarbonate tablet 325 mg  325 mg Oral TID Lucy Brand MD   325 mg at 04/17/24 0845    trazodone split tablet 25 mg  25 mg Oral  QHS Lucy Brand MD   25 mg at 04/17/24 0115    vancomycin - pharmacy to dose   Intravenous pharmacy to manage frequency Fany Abbasi, ALETHA        vancomycin 1,500 mg in dextrose 5 % (D5W) 250 mL IVPB (Vial-Mate)  20 mg/kg Intravenous Once Brayan Ocampo MD        venlafaxine tablet 37.5 mg  37.5 mg Oral Daily Lucy Brand MD   37.5 mg at 04/17/24 0846    warfarin (COUMADIN) tablet 2.5 mg  2.5 mg Oral Once per day on Sunday Monday Wednesday Friday Saturday Lucy Brand MD        And    [START ON 4/18/2024] warfarin split tablet 3.75 mg  3.75 mg Oral Every Tues, Thurs Lucy Brand MD         Scheduled Meds:  Current Facility-Administered Medications   Medication Dose Route Frequency Provider Last Rate Last Admin    acetaminophen tablet 1,000 mg  1,000 mg Oral Q8H PRN Lucy Brand MD   1,000 mg at 04/17/24 0959    amiodarone tablet 400 mg  400 mg Oral Daily Lucy Brand MD   400 mg at 04/17/24 0845    bumetanide tablet 4 mg  4 mg Oral BID Lucy Brand MD   4 mg at 04/17/24 0845    dextrose 10% bolus 125 mL 125 mL  12.5 g Intravenous PRN Erasmo Parrish DNP, FNP        dextrose 10% bolus 250 mL 250 mL  25 g Intravenous PRN Erasmo Parrish DNP, FNP        famotidine tablet 20 mg  20 mg Oral Daily Lucy Brand MD   20 mg at 04/17/24 0845    glucagon (human recombinant) injection 1 mg  1 mg Intramuscular PRN Erasmo Parrish DNP, FNP        glucose chewable tablet 16 g  16 g Oral PRN Erasmo Parrish DNP, FNP        glucose chewable tablet 24 g  24 g Oral PRN Erasmo Parrish DNP, COREEN        hydrALAZINE tablet 75 mg  75 mg Oral Q8H Lucy Brand MD   75 mg at 04/17/24 0657    insulin aspart U-100 pen 0-5 Units  0-5 Units Subcutaneous QID (AC + HS) PRN Erasmo Parrish, DNP, FNP        magnesium oxide tablet 400 mg  400 mg Oral Daily Lucy Brand MD   400 mg at 04/17/24 0845    piperacillin-tazobactam (ZOSYN) 4.5  g in dextrose 5 % in water (D5W) 100 mL IVPB (MB+)  4.5 g Intravenous Q12H Fany Abbasi NP 25 mL/hr at 04/17/24 0830 4.5 g at 04/17/24 0830    senna-docusate 8.6-50 mg per tablet 2 tablet  2 tablet Oral Daily PRN Lucy Brand MD        sodium bicarbonate tablet 325 mg  325 mg Oral TID Lucy Brand MD   325 mg at 04/17/24 0845    trazodone split tablet 25 mg  25 mg Oral QHS Lucy Brand MD   25 mg at 04/17/24 0115    vancomycin - pharmacy to dose   Intravenous pharmacy to manage frequency Fany Abbasi NP        vancomycin 1,500 mg in dextrose 5 % (D5W) 250 mL IVPB (Vial-Mate)  20 mg/kg Intravenous Once Brayan Ocampo MD        venlafaxine tablet 37.5 mg  37.5 mg Oral Daily Lucy Brand MD   37.5 mg at 04/17/24 0846    warfarin (COUMADIN) tablet 2.5 mg  2.5 mg Oral Once per day on Sunday Monday Wednesday Friday Saturday Lucy Brand MD        And    [START ON 4/18/2024] warfarin split tablet 3.75 mg  3.75 mg Oral Every Tues, Thurs Lucy Brand MD         PRN Meds:  Current Facility-Administered Medications   Medication Dose Route Frequency Provider Last Rate Last Admin    acetaminophen tablet 1,000 mg  1,000 mg Oral Q8H PRN Lucy Brand MD   1,000 mg at 04/17/24 0959    amiodarone tablet 400 mg  400 mg Oral Daily Lucy Brand MD   400 mg at 04/17/24 0845    bumetanide tablet 4 mg  4 mg Oral BID Lucy Brand MD   4 mg at 04/17/24 0845    dextrose 10% bolus 125 mL 125 mL  12.5 g Intravenous PRN Erasmo Parrish DNP, GAVINP        dextrose 10% bolus 250 mL 250 mL  25 g Intravenous PRN Erasmo Parrish DNP, GAVINP        famotidine tablet 20 mg  20 mg Oral Daily Lucy Brand MD   20 mg at 04/17/24 0845    glucagon (human recombinant) injection 1 mg  1 mg Intramuscular Erasmo Lopez DNP, FNP        glucose chewable tablet 16 g  16 g Oral Erasmo Lopez DNP, FNP        glucose chewable tablet 24 g  24 g  Oral PRN Erasmo Parrish DNP, FNP        hydrALAZINE tablet 75 mg  75 mg Oral Q8H Lucy Brand MD   75 mg at 04/17/24 0657    insulin aspart U-100 pen 0-5 Units  0-5 Units Subcutaneous QID (AC + HS) PRN Erasmo Parrish DNP, FNP        magnesium oxide tablet 400 mg  400 mg Oral Daily Lucy Brand MD   400 mg at 04/17/24 0845    piperacillin-tazobactam (ZOSYN) 4.5 g in dextrose 5 % in water (D5W) 100 mL IVPB (MB+)  4.5 g Intravenous Q12H Fany Abbasi NP 25 mL/hr at 04/17/24 0830 4.5 g at 04/17/24 0830    senna-docusate 8.6-50 mg per tablet 2 tablet  2 tablet Oral Daily PRN Lucy Brand MD        sodium bicarbonate tablet 325 mg  325 mg Oral TID Lucy Brand MD   325 mg at 04/17/24 0845    trazodone split tablet 25 mg  25 mg Oral QHS Lucy Brand MD   25 mg at 04/17/24 0115    vancomycin - pharmacy to dose   Intravenous pharmacy to manage frequency Fany Abbasi NP        vancomycin 1,500 mg in dextrose 5 % (D5W) 250 mL IVPB (Vial-Mate)  20 mg/kg Intravenous Once Brayan Ocampo MD        venlafaxine tablet 37.5 mg  37.5 mg Oral Daily Lucy Brand MD   37.5 mg at 04/17/24 0846    warfarin (COUMADIN) tablet 2.5 mg  2.5 mg Oral Once per day on Sunday Monday Wednesday Friday Saturday Lucy Brand MD        And    [START ON 4/18/2024] warfarin split tablet 3.75 mg  3.75 mg Oral Every Tues, Thurs Lucy Brand MD           Review of patient's allergies indicates:  No Known Allergies  Objective:     Vital Signs (Most Recent):  Temp: 99.8 °F (37.7 °C) (04/17/24 0750)  Pulse: 81 (04/17/24 0750)  Resp: 18 (04/17/24 0750)  BP: 100/74 (04/17/24 0752)  SpO2: 99 % (04/17/24 0750) Vital Signs (24h Range):  Temp:  [98.3 °F (36.8 °C)-99.8 °F (37.7 °C)] 99.8 °F (37.7 °C)  Pulse:  [81-99] 81  Resp:  [15-26] 18  SpO2:  [96 %-100 %] 99 %  BP: ()/(0-75) 100/74     Patient Vitals for the past 72 hrs (Last 3 readings):   Weight   04/16/24 2224 78 kg  (171 lb 15.3 oz)     Body mass index is 22.08 kg/m².      Intake/Output Summary (Last 24 hours) at 4/17/2024 1020  Last data filed at 4/17/2024 0228  Gross per 24 hour   Intake 200 ml   Output --   Net 200 ml       Hemodynamic Parameters:       Telemetry: SR       Physical Exam  Constitutional:       Appearance: Normal appearance.   HENT:      Head: Normocephalic and atraumatic.   Eyes:      Conjunctiva/sclera: Conjunctivae normal.      Pupils: Pupils are equal, round, and reactive to light.   Neck:      Comments: Do not appreciate elevated JVP  Cardiovascular:      Rate and Rhythm: Normal rate and regular rhythm.      Comments: Smooth VAD hum  Pulmonary:      Effort: Pulmonary effort is normal.      Comments: Breath sounds are decreased right base. Lungs essentially CTA bilaterally  Abdominal:      General: Bowel sounds are normal.      Palpations: Abdomen is soft.   Musculoskeletal:         General: No swelling. Normal range of motion.      Cervical back: Normal range of motion and neck supple.   Skin:     General: Skin is warm and dry.      Capillary Refill: Capillary refill takes 2 to 3 seconds.   Neurological:      General: No focal deficit present.      Mental Status: He is alert and oriented to person, place, and time.            Significant Labs:  CBC:  Recent Labs   Lab 04/11/24  1052 04/16/24  2323 04/17/24  0409   WBC 6.57 7.93 6.92   RBC 3.15* 3.13* 3.34*   HGB 8.9* 9.1* 9.3*   HCT 28.5* 27.1* 29.6*    367 366   MCV 91 87 89   MCH 28.3 29.1 27.8   MCHC 31.2* 33.6 31.4*     BNP:  Recent Labs   Lab 04/11/24  1052 04/17/24  0409   * 856*     CMP:  Recent Labs   Lab 04/11/24  1052 04/17/24  0030 04/17/24  0409   * 179* 117*   CALCIUM 9.5 8.9 9.5   ALBUMIN 3.4* 3.0* 3.2*   PROT 8.3 7.7 8.2   * 134* 134*   K 3.9 4.0 3.9   CO2 26 23 25   CL 96 95 94*   BUN 72* 76* 82*   CREATININE 4.1* 4.3* 4.3*   ALKPHOS 130 127 143*   ALT 77* 88* 101*   AST 55* 76* 88*   BILITOT 0.3 0.4 0.5       Coagulation:   Recent Labs   Lab 04/11/24  1052 04/16/24 2323 04/17/24  0409   INR 2.7* 2.2* 2.3*   APTT  --   --  43.6*     LDH:  Recent Labs   Lab 04/16/24 2323 04/17/24  0409   * 461*     Microbiology:  Microbiology Results (last 7 days)       Procedure Component Value Units Date/Time    Blood culture x two cultures. Draw prior to antibiotics. [[id number]] Collected: 04/16/24 2322    Order Status: Completed Specimen: Blood from Peripheral, Hand, Left Updated: 04/17/24 0715     Blood Culture, Routine No Growth to date    Narrative:      Aerobic and anaerobic    Blood culture x two cultures. Draw prior to antibiotics. [[id number]] Collected: 04/16/24 2322    Order Status: Completed Specimen: Blood from Peripheral, Hand, Right Updated: 04/17/24 0715     Blood Culture, Routine No Growth to date    Narrative:      Aerobic and anaerobic    Influenza A & B by Molecular [[id number]] Collected: 04/16/24 2347    Order Status: Completed Specimen: Nasopharyngeal Swab Updated: 04/17/24 0059     Influenza A, Molecular Negative     Influenza B, Molecular Negative     Flu A & B Source Nasal swab            I have reviewed all pertinent labs within the past 24 hours.    Estimated Creatinine Clearance: 19.7 mL/min (A) (based on SCr of 4.3 mg/dL (H)).    Diagnostic Results:  I have reviewed and interpreted all pertinent imaging results/findings within the past 24 hours.

## 2024-04-17 NOTE — PT/OT/SLP EVAL
Physical Therapy Co-Evaluation and Co-Treatment    Patient Name:  Radha Abbott   MRN:  [id number]  Admit Date: 4/16/2024  Admitting Diagnosis:  Pneumonia   Length of Stay: 1 days  Recent Surgery: * No surgery found *      Recommendations:     Discharge Recommendations:  Low Intensity Therapy     Discharge Equipment Recommendations: walker, rolling   Barriers to discharge: None    Appropriate transfer level with nursing staff: Safe to transfer to bedside chair/bedside commode: stand pivot with 1 person with RW.    Plan:     During this hospitalization, patient to be seen 4 x/week to address the identified rehab impairments via gait training, therapeutic activities, therapeutic exercises, neuromuscular re-education and progress towards the established goals.  Plan of Care Expires:  05/17/24  Plan of Care Reviewed with: patient, spouse    Assessment:     Radha Abbott is a 64 y.o. male admitted with a medical diagnosis of Pneumonia. Pt agreeable to therapy session. Pt with LVAD implantation on 12/1/23. Pt reports [place] with mobility and ADLs up until this past Sunday where he reports weakness resulting in need for assistance and RW use with mobility. Patient with BLE weakness resulting in increased need for assistance compared to his baseline PLOF. Pt walked ~16' in room with min A with RW and reported fatigue after trial. Patient would benefit from skilled therapy services to maximize safety and independence, increase activity tolerance, decrease fall risk, decrease caregiver burden, improve QOL, improve patient's functional mobility, and decrease risk of contractures and pressure sores. Patient currently demonstrates a need for low intensity therapy on a scheduled basis secondary to a decline in functional status due to illness      Patient demonstrates a mobility limitation that significantly impairs their ability to participate in one or more mobility related activities of daily living. Patient's mobility  limitation cannot be sufficiently resolved with the use of a cane, but can be sufficiently resolved with the use of a rolling walker.The use of a rolling walker will considerably improve their ability to participate in MRADLs. Patient will use the walker on a regular basis at home.       Problem List: impaired endurance, weakness, impaired self care skills, impaired functional mobility, gait instability, impaired balance, decreased upper extremity function, decreased lower extremity function, impaired cardiopulmonary response to activity, decreased safety awareness, pain.  Rehab Prognosis: Good; patient would benefit from acute skilled PT services to address these deficits and reach maximum level of function.      Goals:   Multidisciplinary Problems       Physical Therapy Goals          Problem: Physical Therapy    Goal Priority Disciplines Outcome Goal Variances Interventions   Physical Therapy Goal     PT, PT/OT Ongoing, Progressing     Description: Goals to be met by: 24     Patient will increase functional independence with mobility by performin. Supine to sit with Modified [place]  2. Sit to stand transfer with Modified [place]  3. Bed to chair transfer with Modified [place] using LRAD  4. Gait  x 150 feet with Supervision using LRAD.   5. Lower extremity exercise program x15 reps per handout, with independence                         Subjective     KYAW Chapa notified prior to session. Wife present upon PT entrance into room. Patient agreeable to PT evaluation.    Chief Complaint: weakness over past few days  Patient/Family Comments/goals: feel better and go home  Pain/Comfort:  Pain Rating 1: 0/10  Pain Rating Post-Intervention 1: 0/10    Social History:  Residence: lives with their spouse 1-story house with threshold JACKIE. Pt's bathroom has a WIS with built in chair and grab bars.  Support available: family  Equipment Owned (using): rollator, shower chair, grab bar, other (see  "comments) ([facility] overtoilet)  Prior level of function: independent until about 3 days ago where pt reports weakness needing assistance with RW use with mobility and ADLs      Patient reports they will have assistance from family upon discharge.    Objective:     Additional staff present: OT; OT for co-evaluation due to suspected patient need for two skilled therapists to appropriately assess patient's functional deficits as well as ensure patient safety, accommodate for limited activity tolerance, and provide appropriate, skilled assistance to maximize functional potential during evaluation.    Patient found supine with: telemetry, peripheral IV     General Precautions: Standard, Cardiac fall, LVAD   Orthopedic Precautions:N/A   Braces: N/A   Body mass index is 21.71 kg/m².  Oxygen Device: Room Air  Vitals: BP (!) 70/0 (BP Location: Left arm, Patient Position: Sitting)   Pulse 93   Temp 99.2 °F (37.3 °C) (Oral)   Resp 18   Ht 6' 2" (1.88 m)   Wt 76.7 kg (169 lb 1.5 oz)   SpO2 95%   BMI 21.71 kg/m²       Exams:    Cognition:  Oriented X 4, Alert, Cooperative, and Flat affect  Command following: Follows multistep verbal commands  Communication: clear/fluent    Sensation:   Light touch sensation: Intact BLEs    Gross Motor Coordination: No deficits noted during functional mobility tasks     Edema/Skin Integrity: None noted; Visible skin intact    Postural examination/scapula alignment: Rounded shoulder and Head forward    Lower Extremity Range of Motion:  Right Lower Extremity: WFL  Left Lower Extremity: WFL    Lower Extremity Strength:  Right Lower Extremity: Deficits: grossly 4/5 in all major muscle groups  Left Lower Extremity: Deficits: grossly 4/5 in all major muscle groups       Functional Mobility:    Bed Mobility:  Scooting with stand by assistance  Supine > Sit with minimum assistance  Sit > Supine with stand by assistance    Transfers:   Sit <> Stand Transfer: Minimal Assistance x 1 trials from EOB with " RW   Bed <> Chair: pt declined this session             Gait:  Distance: 8' fwd/back + 4 side steps right  Assistance level: Contact Guard Assistance  Assistive Device: rolling walker  Gait Deviation(s): occasional unsteady gait, decreased step length, narrow base of support, flexed posture, and decreased gina  Gait deficits due to: BLE weakness, impaired balance  all lines remained intact throughout ambulation trial    Balance:  Sitting Balance  Static: stand by assistance  Dynamic: contact guard assistance  Standing:  Static: contact guard assistance  Dynamic: minimum assistance    Outcome Measures:  AM-PAC 6 CLICK MOBILITY  Turning over in bed (including adjusting bedclothes, sheets and blankets)?: 3  Sitting down on and standing up from a chair with arms (e.g., wheelchair, bedside commode, etc.): 3  Moving from lying on back to sitting on the side of the bed?: 3  Moving to and from a bed to a chair (including a wheelchair)?: 3  Need to walk in hospital room?: 3  Climbing 3-5 steps with a railing?: 2  Basic Mobility Total Score: 17     Education:  Time provided for education, counseling and discussion of health disposition in regards to patient's current status  All questions answered within PT scope of practice and to patient's satisfaction  PT role in POC to address current functional deficits  Call nursing/pct or family assist to transfer to chair/use bathroom. Pt stated understanding.    Patient left HOB elevated with all lines intact, call button in reach, RN notified, and wife present.      History:     Past Medical History:   Diagnosis Date    CAD (coronary artery disease)     CHF (congestive heart failure)     Diabetes mellitus     HFrEF (heart failure with reduced ejection fraction)     ICD (implantable cardioverter-defibrillator) in place     MI, old        Past Surgical History:   Procedure Laterality Date    ANGIOPLASTY-VENOUS ARTERY Right 12/1/2023    Procedure: ANGIOPLASTY-VENOUS ARTERY, RIGHT  FEMORAL;  Surgeon: Yuri Washington MD;  Location: [facility] OR 2ND FLR;  Service: Cardiovascular;  Laterality: Right;    AORTIC VALVULOPLASTY N/A 12/1/2023    Procedure: REPAIR, AORTIC VALVE;  Surgeon: Yuri Washington MD;  Location: [facility] OR 2ND FLR;  Service: Cardiovascular;  Laterality: N/A;    CARDIAC SURGERY      CLOSURE N/A 12/1/2023    Procedure: CLOSURE, TEMPORARY;  Surgeon: Yuri Washington MD;  Location: [facility] OR 2ND FLR;  Service: Cardiovascular;  Laterality: N/A;    DRAINAGE OF PLEURAL EFFUSION  12/4/2023    Procedure: DRAINAGE, PLEURAL EFFUSION;  Surgeon: Yuri Washington MD;  Location: [facility] OR [facility] FLR;  Service: Cardiovascular;;    INSERTION OF GRAFT TO PERICARDIUM  12/4/2023    Procedure: INSERTION, GRAFT, PERICARDIUM;  Surgeon: Yuri Washington MD;  Location: [facility] OR [facility] FLR;  Service: Cardiovascular;;    INSERTION OF INTRA-AORTIC BALLOON ASSIST DEVICE Right 11/21/2023    Procedure: INSERTION, INTRA-AORTIC BALLOON PUMP;  Surgeon: Finn Cohn MD;  Location: [facility] CATH LAB;  Service: Cardiology;  Laterality: Right;    LEFT VENTRICULAR ASSIST DEVICE Left 12/1/2023    Procedure: INSERTION-LEFT VENTRICULAR ASSIST DEVICE;  Surgeon: Yuri Washington MD;  [facility];  Service: Cardiovascular;  Laterality: Left;  REDO STERNOTOMY - REDO SAW NEEDED FOR CASE    LYSIS OF ADHESIONS  12/1/2023    Procedure: LYSIS, ADHESIONS;  Surgeon: Yuri Washington MD;  Location: [facility] OR [facility] FLR;  Service: Cardiovascular;;    PLACEMENT OF SWAN ROLANDO CATHETER WITH IMAGING GUIDANCE  11/20/2023    Procedure: INSERTION, CATHETER, SWAN-ROLANDO, WITH IMAGING GUIDANCE;  Surgeon: Sajan Hurley MD;  Location: [facility] CATH LAB;  Service: Cardiology;;    REMOVAL OF TUNNELED CENTRAL VENOUS CATHETER (CVC) N/A 3/1/2024    Procedure: REMOVAL, CATHETER, CENTRAL VENOUS, TUNNELED;  Surgeon: Seble Aguilar MD;  Location: [facility] CATH LAB;  Service: Interventional Nephrology;  Laterality: N/A;    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  12/1/2023    Procedure: REPAIR, ANEURYSM, ARTERY, FEMORAL;  Surgeon: Yuri Washington MD;  [address]  Service: Cardiovascular;  Laterality: Right;  Right Femoral Artery Repair    RIGHT HEART CATHETERIZATION Right 10/10/2023    Procedure: INSERTION, CATHETER, RIGHT HEART;  Surgeon: Bin Gandhi MD;  Location: [facility] CATH LAB;  Service: Cardiology;  Laterality: Right;    RIGHT HEART CATHETERIZATION Right 10/13/2023    Procedure: INSERTION, CATHETER, RIGHT HEART;  Surgeon: Walter Mcintyre MD;  Location: [facility] CATH LAB;  Service: Cardiology;  Laterality: Right;    RIGHT HEART CATHETERIZATION  11/13/2023    RIGHT HEART CATHETERIZATION Right 11/13/2023    Procedure: INSERTION, CATHETER, RIGHT HEART;  Surgeon: Juventino Bermudez Jr., MD;  Location: [facility] CATH LAB;  Service: Cardiology;  Laterality: Right;    RIGHT HEART CATHETERIZATION Right 11/20/2023    Procedure: INSERTION, CATHETER, RIGHT HEART;  Surgeon: Sajan Hurley MD;  Location: [facility] CATH LAB;  Service: Cardiology;  Laterality: Right;    RIGHT HEART CATHETERIZATION Right 1/22/2024    Procedure: INSERTION, CATHETER, RIGHT HEART;  Surgeon: Brayan Ocampo MD;  Location: [facility] CATH LAB;  Service: Cardiology;  Laterality: Right;    STERNAL WOUND CLOSURE N/A 12/4/2023    Procedure: CLOSURE, WOUND, STERNUM;  Surgeon: Yuri Washington MD;  Location: [address];  Service: Cardiovascular;  Laterality: N/A;    STERNOTOMY N/A 12/1/2023    Procedure: STERNOTOMY, REDO;  Surgeon: Yuri Washington MD;  [address]  Service: Cardiovascular;  Laterality: N/A;    VALVULOPLASTY, MITRAL VALVE N/A 12/1/2023    Procedure: VALVULOPLASTY, MITRAL VALVE;  Surgeon: Yuri Washington MD;  Location: [address];  Service: Cardiovascular;  Laterality: N/A;       No family history on file.    Social History     Socioeconomic History    Marital status:    Tobacco Use    Smoking status: Former     Current packs/day: 0.50     Types: Cigarettes    Substance and Sexual Activity    Alcohol use: Yes     Comment: rarely    Drug use: No    Sexual activity: Not Currently     Partners: Female     Social Determinants of Health     Financial Resource Strain: High Risk (3/26/2024)    Overall Financial Resource Strain (CARDIA)     Difficulty of Paying Living Expenses: Hard   Food Insecurity: Food Insecurity Present (3/26/2024)    Hunger Vital Sign     Worried About Running Out of Food in the Last Year: Sometimes true     Ran Out of Food in the Last Year: Often true   Transportation Needs: No Transportation Needs (3/26/2024)    PRAPARE - Transportation     Lack of Transportation (Medical): No     Lack of Transportation (Non-Medical): No   Physical Activity: Inactive (2/29/2024)    Exercise Vital Sign     Days of Exercise per Week: 2 days     Minutes of Exercise per Session: 0 min   Stress: Stress Concern Present (3/26/2024)    [employer] - Occupational Stress Questionnaire     Feeling of Stress : To some extent   Social Connections: Moderately Integrated (2/29/2024)    Social Connection and Isolation Panel [NHANES]     Frequency of Communication with Friends and Family: More than three times a week     Frequency of Social Gatherings with Friends and Family: Once a week     Attends Yarsanism Services: More than 4 times per year     Active Member of Clubs or Organizations: No     Attends Club or Organization Meetings: Never     Marital Status:    Housing Stability: High Risk (3/26/2024)    Housing Stability Vital Sign     Unable to Pay for Housing in the Last Year: Yes     Number of Places Lived in the Last Year: 1     Unstable Housing in the Last Year: No       Time Tracking:     PT Received On: 04/17/24  PT Start Time: 0905     PT Stop Time: 0928  PT Total Time (min): 23 min     Billable Minutes: Evaluation 12 minutes and Gait Training 11 minutes    4/17/2024

## 2024-04-17 NOTE — SUBJECTIVE & OBJECTIVE
Interval HPI:   Overnight events: No acute events overnight. Patient in room 311/311 A. Blood glucose stable. BG at and above goal on current insulin regimen (SSI ). Steroid use- None.    Renal function- Abnormal - Creatinine 4.3   Vasopressors-  None       Endocrine will continue to follow and manage insulin orders inpatient.       Diet Renal Coumadin Restriction; Fluid - 1500mL; Isolation Tray - Regular [place]     Eatin%  Nausea: No  Hypoglycemia and intervention: No  Fever: No  TPN and/or TF: No    PMH, PSH, FH, SH updated and reviewed     ROS:  Review of Systems   Constitutional:  Negative for unexpected weight change.   Eyes:  Negative for visual disturbance.   Respiratory:  Negative for cough.    Cardiovascular:  Negative for chest pain.   Gastrointestinal:  Negative for nausea and vomiting.   Endocrine: Negative for polydipsia and polyuria.   Musculoskeletal:  Negative for back pain.   Skin:  Negative for rash.   Neurological:  Negative for syncope.   Psychiatric/Behavioral:  Negative for agitation and dysphoric mood.        Current Medications and/or Treatments Impacting Glycemic Control  Immunotherapy:    Immunosuppressants       None          Steroids:   Hormones (From admission, onward)      None          Pressors:    Autonomic Drugs (From admission, onward)      None          Hyperglycemia/Diabetes Medications:   Antihyperglycemics (From admission, onward)      Start     Stop Route Frequency Ordered    24 0738  insulin aspart U-100 pen 0-5 Units         -- SubQ Before meals & nightly PRN 24 0638             PHYSICAL EXAMINATION:  Vitals:    24 1200   BP: (!) 70/0   Pulse: 93   Resp: 18   Temp: 99.2 °F (37.3 °C)     Body mass index is 21.71 kg/m².     Physical Exam    Physical exam deferred due to COVID-19.

## 2024-04-17 NOTE — PROGRESS NOTES
Admit Note     Heart Transplant  spoke with the pt's spouse in order to assess needs. Pt's spouse reports the pt is not feeling well enough to talk on the phone. Pt and spouse are familiar with the role of the  from past hospital admissions. Pt's spouse agreed to complete assessment by phone.       Patient is a 64 y.o.  male, admitted per medical record due to sepsis, COVID +, hypertension, headache,heart failure with reduced ejection fraction and ventricular tachycardia.  Pt received an LVAD on 3/27/24.      Patient admitted to [facility] on 4/16/2024 .    At this time, patient presents per spouse as alert and oriented x 4 and calm.  At this time, patients caregiver presents by phone  as alert and oriented x 4, calm, communicative, and asking and answering questions appropriately.      Household/Family Systems (as reported by patients caregiver)     Patient resides with patient's spouse, at       [address] [address].      Support system includes spouse, siblings and adult children.    Patient does not have dependents that are need of being cared for.  The pt has been  since 2020. The pt has three adult children and the pt's spouse has one adult child.   The pt has three sisters and two brothers, all live near the pt.      Patients primary caregiver is self and spouse.    Pt's cell: [phone]    Emergency contacts:   Arlyn Abbott (spouse, works full time, but has FMLA and drives) [phone]  Teetee Zhou ( pt's sister, unemployed and drives) [phone], [phone]  .    During admission, patient's caregiver plans to stay in patient's room.  Confirmed patient and patients caregivers do have access to reliable transportation.  Pt, spouse and extended family drive.     Cognitive Status/Learning     Patients caregiver reports patients reading ability as 12th grade and states patient does not have difficulty with reading, writing, seeing, hearing,  comprehension, learning, and memory.    Patients caregiver reports patient learns best by one on one support.     Needed: No.   Highest education level: High School (9-12) or GED    Vocation/Disability (as reported by patients caregiver)    Working for Income: No  If no, reason not working: Disability  Patient was a . Pt started receiving disability in 2009 due to his heart.   SS: $500 per month  The pt's spouse is employed full time at [employer] as a patient access rep. Pt's spouse reports they are struggling to keep up with household expenses including electricity. Family has provided assistance.   It's not clear if the pt applied for SNAP assistance.   Spouse has not been able to locate electrical support programs in her area.     Adherence     Patients caregiver reports patient has a high level of adherence to patients health care regimen.  Adherence counseling and education provided.  Patient's caregiver verbalizes understanding.    Substance Use    Patients caregiver reports patients substance usage as the following:    Tobacco: no current use.  Pt has reported in the past no tobacco use since 9/2023.  Alcohol: no use per spouse.  Illicit Drugs/Non-prescribed Medications: no use per spouse.  Patients caregiver states clear understanding of the potential impact of substance use.  Substance abstinence/cessation counseling, education and resources provided and reviewed.     Services Utilizing/ADLS (as reported by patients caregiver)    Infusion Service: Prior to admission, patient utilizing? no pt has used [facility] in the past and the pt's spouse would like the same company to be used if needed when discharged.   Home Health: Prior to admission, patient utilizing? no pt has used [facility] in the past. Spouse reports she would like to use the same company if HH is  needed when pt is discharged.   DME: Prior to admission, yes [employer] and [facility]  Pulmonary/Cardiac Rehab:  Prior to admission, no  Dialysis:  Prior to admission, no  Transplant Specialty Pharmacy:  Prior to admission, no.    Prior to admission, patients caregiver reports patient was  independent with ADLS and was driving.  Patients caregiver reports patient is not able to care for self at this time due to compromised medical condition (as documented in medical record) and physical weakness..  Patients caregiver reports patient indicates a willingness to care for self once medically cleared to do so.    Insurance/Medications    Insured by   Payer/Plan Subscr  Sex Relation Sub. Ins. ID Effective Group Num   1. MEDICAID - U[place]ISON BUCK 1960 Male Self [id number]                                   [address] [address]   2. [employer]ISON BUCK 1960 Male Self [id number] 10/31/23                                    [address] [address]      Primary Insurance (for UNOS reporting): Public Insurance - Medicaid  Secondary Insurance (for UNOS reporting): None    Patients caregiver reports patient is able to obtain and afford medications at this time and at time of discharge.      Living Will/Healthcare Power of     Patients caregiver reports patient has a LW and/or HCPA.   provided education regarding LW and HCPA and the completion of forms.    Coping/Mental Health (as reported by patients caregiver)    Pt's spouse reports the pt is coping adequately at this time with current hospital stay.   The has experienced difficulty with depression due to medical needs limited pt's ability to return to work as a . Pt has been seen by psychiatry in the past. The pt's spouse reports the pt is taking Effexor once a day, but is not taking the Trazondone due to not being able to get it filled at [facility].   Pt's spouse reports the pt and his family have been spending time together. Pt went fishing with his nephew on Monday and the pt has been able to cut the grass.  Spouse reports the pt has  been sleeping well.    Worker proved active listening and encouragement.       Discharge Planning (as reported by patients caregiver)    At time of discharge, patient plans to return to patient's home under the care of self and spouse.  Patients spouse will transport patient.  Per rounds today, expected discharge date has not been medically determined at this time. Patients caretaker verbalizes understanding and is involved in treatment planning and discharge process.    Additional Concerns    Patient is being followed for needs, education, resources, information, emotional support, supportive counseling, and for supportive and skilled discharge plan of care.  providing ongoing psychosocial support, education, resources and d/c planning as needed.  SW remains available. Patient's caregiver verbalizes understanding and agreement with information reviewed,  availability and how to access available resources as needed.

## 2024-04-17 NOTE — ED TRIAGE NOTES
Radha Abbott, a 64 y.o. male presents to the ED w/ complaint of generalized weakness and 101 fever at home. Pt denies any CP or SOB. LVAD pt, denies any alarms.     Triage note:  Chief Complaint   Patient presents with    Weakness     With fever for 1.5 days; LVAD pt      Review of patient's allergies indicates:  No Known Allergies  Past Medical History:   Diagnosis Date    CAD (coronary artery disease)     CHF (congestive heart failure)     Diabetes mellitus     HFrEF (heart failure with reduced ejection fraction)     ICD (implantable cardioverter-defibrillator) in place     MI, old

## 2024-04-17 NOTE — PLAN OF CARE
Recommendations     1.) Recommend continuing with Renal/Coumadin diet, fluid per MD.                  - consider liberalizing diet as renal labs improve, with goal of Cardiac/Coumadin diet.      2.) If PO intake <50%, recommend Boost Plus BID to help meet increased needs.      3.) RD to monitor.         Goals: to meet % of EEN/EPN by next RD f/u  Nutrition Goal Status: new  Communication of RD Recs:  (POC)

## 2024-04-17 NOTE — PROGRESS NOTES
[facility] - Cardiology Stepdown  Heart Transplant  Progress Note    Patient Name: Radha Abbott  MRN: [id number]  Admission Date: 4/16/2024  Hospital Length of Stay: 1 days  Attending Physician: Brayan Ocampo MD  Primary Care Provider: Vasu Kong MD  Principal Problem:Pneumonia    Subjective:   Interval History: Feels weak and cough is productive of yellow sputum. Temp max since admit 99.8. O2 sats 100% on RA. Euvolemic on exam. Starting Vanc and Zosyn for new RML opacity on CT c/a/p and consulting ID. Blood cxs with NGTD    Continuous Infusions:  Current Facility-Administered Medications   Medication Dose Route Frequency Provider Last Rate Last Admin    acetaminophen tablet 1,000 mg  1,000 mg Oral Q8H PRN Lucy Brand MD   1,000 mg at 04/17/24 0959    amiodarone tablet 400 mg  400 mg Oral Daily Lucy Brand MD   400 mg at 04/17/24 0845    bumetanide tablet 4 mg  4 mg Oral BID Lucy Brand MD   4 mg at 04/17/24 0845    dextrose 10% bolus 125 mL 125 mL  12.5 g Intravenous PRN Erasmo Parrish DNP, COREEN        dextrose 10% bolus 250 mL 250 mL  25 g Intravenous PRN Erasmo Parrish DNP, FNP        famotidine tablet 20 mg  20 mg Oral Daily Lucy Brand MD   20 mg at 04/17/24 0845    glucagon (human recombinant) injection 1 mg  1 mg Intramuscular PRN Erasmo Parrish DNP, COREEN        glucose chewable tablet 16 g  16 g Oral PRN Erasmo Parrish DNP, COEREN        glucose chewable tablet 24 g  24 g Oral PRN Erasmo Parrish DNP, COREEN        hydrALAZINE tablet 75 mg  75 mg Oral Q8H Lucy Brand MD   75 mg at 04/17/24 0657    insulin aspart U-100 pen 0-5 Units  0-5 Units Subcutaneous QID (AC + HS) PRN Erasmo Parrish DNP, FNP        magnesium oxide tablet 400 mg  400 mg Oral Daily Lucy Brand MD   400 mg at 04/17/24 0845    piperacillin-tazobactam (ZOSYN) 4.5 g in dextrose 5 % in water (D5W) 100 mL IVPB (MB+)  4.5 g Intravenous Q12H Fany Abbasi,  NP 25 mL/hr at 04/17/24 0830 4.5 g at 04/17/24 0830    senna-docusate 8.6-50 mg per tablet 2 tablet  2 tablet Oral Daily PRN Lucy Brand MD        sodium bicarbonate tablet 325 mg  325 mg Oral TID Lucy Brand MD   325 mg at 04/17/24 0845    trazodone split tablet 25 mg  25 mg Oral QHS Lucy Brand MD   25 mg at 04/17/24 0115    vancomycin - pharmacy to dose   Intravenous pharmacy to manage frequency Fany Abbasi, ALETHA        vancomycin 1,500 mg in dextrose 5 % (D5W) 250 mL IVPB (Vial-Mate)  20 mg/kg Intravenous Once Brayan Ocampo MD        venlafaxine tablet 37.5 mg  37.5 mg Oral Daily Lucy Brand MD   37.5 mg at 04/17/24 0846    warfarin (COUMADIN) tablet 2.5 mg  2.5 mg Oral Once per day on Sunday Monday Wednesday Friday Saturday Lucy Brand MD        And    [START ON 4/18/2024] warfarin split tablet 3.75 mg  3.75 mg Oral Every Tues, Thurs Lucy Brand MD         Scheduled Meds:  Current Facility-Administered Medications   Medication Dose Route Frequency Provider Last Rate Last Admin    acetaminophen tablet 1,000 mg  1,000 mg Oral Q8H PRN Lucy Brand MD   1,000 mg at 04/17/24 0959    amiodarone tablet 400 mg  400 mg Oral Daily Lucy Brand MD   400 mg at 04/17/24 0845    bumetanide tablet 4 mg  4 mg Oral BID Lucy Brand MD   4 mg at 04/17/24 0845    dextrose 10% bolus 125 mL 125 mL  12.5 g Intravenous PRN Erasmo Parrish DNP, COREEN        dextrose 10% bolus 250 mL 250 mL  25 g Intravenous PRN Erasmo Parrish DNP, COREEN        famotidine tablet 20 mg  20 mg Oral Daily Lucy Brand MD   20 mg at 04/17/24 0845    glucagon (human recombinant) injection 1 mg  1 mg Intramuscular PRN Erasmo Parrish DNP, COREEN        glucose chewable tablet 16 g  16 g Oral PRN Erasmo Parrish, RONI, GAVINP        glucose chewable tablet 24 g  24 g Oral PRN Erasmo Parrish, RONI, GAVINP        hydrALAZINE tablet 75 mg  75 mg Oral Q8H  Lucy Brand MD   75 mg at 04/17/24 0657    insulin aspart U-100 pen 0-5 Units  0-5 Units Subcutaneous QID (AC + HS) PRN Erasmo Parrish, DNP, FNP        magnesium oxide tablet 400 mg  400 mg Oral Daily Lucy Brand MD   400 mg at 04/17/24 0845    piperacillin-tazobactam (ZOSYN) 4.5 g in dextrose 5 % in water (D5W) 100 mL IVPB (MB+)  4.5 g Intravenous Q12H Fany Abbasi NP 25 mL/hr at 04/17/24 0830 4.5 g at 04/17/24 0830    senna-docusate 8.6-50 mg per tablet 2 tablet  2 tablet Oral Daily PRN Lucy Brand MD        sodium bicarbonate tablet 325 mg  325 mg Oral TID Lucy Brand MD   325 mg at 04/17/24 0845    trazodone split tablet 25 mg  25 mg Oral QHS Lucy Brand MD   25 mg at 04/17/24 0115    vancomycin - pharmacy to dose   Intravenous pharmacy to manage frequency Fany Abbasi NP        vancomycin 1,500 mg in dextrose 5 % (D5W) 250 mL IVPB (Vial-Mate)  20 mg/kg Intravenous Once Brayan Ocampo MD        venlafaxine tablet 37.5 mg  37.5 mg Oral Daily Lucy Brand MD   37.5 mg at 04/17/24 0846    warfarin (COUMADIN) tablet 2.5 mg  2.5 mg Oral Once per day on Sunday Monday Wednesday Friday Saturday Lucy Brand MD        And    [START ON 4/18/2024] warfarin split tablet 3.75 mg  3.75 mg Oral Every Tues, Thurs Lucy Brand MD         PRN Meds:  Current Facility-Administered Medications   Medication Dose Route Frequency Provider Last Rate Last Admin    acetaminophen tablet 1,000 mg  1,000 mg Oral Q8H PRN Lucy Brand MD   1,000 mg at 04/17/24 0959    amiodarone tablet 400 mg  400 mg Oral Daily Lucy Brand MD   400 mg at 04/17/24 0845    bumetanide tablet 4 mg  4 mg Oral BID Lucy Brand MD   4 mg at 04/17/24 0845    dextrose 10% bolus 125 mL 125 mL  12.5 g Intravenous PRN Erasmo Parrish, RONI, FNP        dextrose 10% bolus 250 mL 250 mL  25 g Intravenous PRN Erasmo Parrish, RONI, FNP        famotidine  tablet 20 mg  20 mg Oral Daily Lucy Brand MD   20 mg at 04/17/24 0845    glucagon (human recombinant) injection 1 mg  1 mg Intramuscular PRN Erasmo Parrish DNP, FNP        glucose chewable tablet 16 g  16 g Oral PRN Erasmo Parrish DNP, FNP        glucose chewable tablet 24 g  24 g Oral PRN Erasmo Parrish DNP, FNP        hydrALAZINE tablet 75 mg  75 mg Oral Q8H Lucy Brand MD   75 mg at 04/17/24 0657    insulin aspart U-100 pen 0-5 Units  0-5 Units Subcutaneous QID (AC + HS) PRN Erasmo Parrish DNP, FNP        magnesium oxide tablet 400 mg  400 mg Oral Daily Lucy Brand MD   400 mg at 04/17/24 0845    piperacillin-tazobactam (ZOSYN) 4.5 g in dextrose 5 % in water (D5W) 100 mL IVPB (MB+)  4.5 g Intravenous Q12H Fany Abbasi NP 25 mL/hr at 04/17/24 0830 4.5 g at 04/17/24 0830    senna-docusate 8.6-50 mg per tablet 2 tablet  2 tablet Oral Daily PRN Lucy Brand MD        sodium bicarbonate tablet 325 mg  325 mg Oral TID Lucy Brand MD   325 mg at 04/17/24 0845    trazodone split tablet 25 mg  25 mg Oral QHS Lucy Brand MD   25 mg at 04/17/24 0115    vancomycin - pharmacy to dose   Intravenous pharmacy to manage frequency Fany Abbasi NP        vancomycin 1,500 mg in dextrose 5 % (D5W) 250 mL IVPB (Vial-Mate)  20 mg/kg Intravenous Once Brayan Ocampo MD        venlafaxine tablet 37.5 mg  37.5 mg Oral Daily Lucy Brand MD   37.5 mg at 04/17/24 0846    warfarin (COUMADIN) tablet 2.5 mg  2.5 mg Oral Once per day on Sunday Monday Wednesday Friday Saturday Lucy Brand MD        And    [START ON 4/18/2024] warfarin split tablet 3.75 mg  3.75 mg Oral Every Tues, Lucy Link MD           Review of patient's allergies indicates:  No Known Allergies  Objective:     Vital Signs (Most Recent):  Temp: 99.8 °F (37.7 °C) (04/17/24 0750)  Pulse: 81 (04/17/24 0750)  Resp: 18 (04/17/24 0750)  BP: 100/74 (04/17/24  0752)  SpO2: 99 % (04/17/24 0750) Vital Signs (24h Range):  Temp:  [98.3 °F (36.8 °C)-99.8 °F (37.7 °C)] 99.8 °F (37.7 °C)  Pulse:  [81-99] 81  Resp:  [15-26] 18  SpO2:  [96 %-100 %] 99 %  BP: ()/(0-75) 100/74     Patient Vitals for the past 72 hrs (Last 3 readings):   Weight   04/16/24 2224 78 kg (171 lb 15.3 oz)     Body mass index is 22.08 kg/m².      Intake/Output Summary (Last 24 hours) at 4/17/2024 1020  Last data filed at 4/17/2024 0228  Gross per 24 hour   Intake 200 ml   Output --   Net 200 ml       Hemodynamic Parameters:       Telemetry: SR       Physical Exam  Constitutional:       Appearance: Normal appearance.   HENT:      Head: Normocephalic and atraumatic.   Eyes:      Conjunctiva/sclera: Conjunctivae normal.      Pupils: Pupils are equal, round, and reactive to light.   Neck:      Comments: Do not appreciate elevated JVP  Cardiovascular:      Rate and Rhythm: Normal rate and regular rhythm.      Comments: Smooth VAD hum  Pulmonary:      Effort: Pulmonary effort is normal.      Comments: Breath sounds are decreased right base. Lungs essentially CTA bilaterally  Abdominal:      General: Bowel sounds are normal.      Palpations: Abdomen is soft.   Musculoskeletal:         General: No swelling. Normal range of motion.      Cervical back: Normal range of motion and neck supple.   Skin:     General: Skin is warm and dry.      Capillary Refill: Capillary refill takes 2 to 3 seconds.   Neurological:      General: No focal deficit present.      Mental Status: He is alert and oriented to person, place, and time.            Significant Labs:  CBC:  Recent Labs   Lab 04/11/24  1052 04/16/24  2323 04/17/24  0409   WBC 6.57 7.93 6.92   RBC 3.15* 3.13* 3.34*   HGB 8.9* 9.1* 9.3*   HCT 28.5* 27.1* 29.6*    367 366   MCV 91 87 89   MCH 28.3 29.1 27.8   MCHC 31.2* 33.6 31.4*     BNP:  Recent Labs   Lab 04/11/24  1052 04/17/24  0409   * 856*     CMP:  Recent Labs   Lab 04/11/24  1052 04/17/24  0030  04/17/24  0409   * 179* 117*   CALCIUM 9.5 8.9 9.5   ALBUMIN 3.4* 3.0* 3.2*   PROT 8.3 7.7 8.2   * 134* 134*   K 3.9 4.0 3.9   CO2 26 23 25   CL 96 95 94*   BUN 72* 76* 82*   CREATININE 4.1* 4.3* 4.3*   ALKPHOS 130 127 143*   ALT 77* 88* 101*   AST 55* 76* 88*   BILITOT 0.3 0.4 0.5      Coagulation:   Recent Labs   Lab 04/11/24  1052 04/16/24 2323 04/17/24 0409   INR 2.7* 2.2* 2.3*   APTT  --   --  43.6*     LDH:  Recent Labs   Lab 04/16/24 2323 04/17/24 0409   * 461*     Microbiology:  Microbiology Results (last 7 days)       Procedure Component Value Units Date/Time    Blood culture x two cultures. Draw prior to antibiotics. [[id number]] Collected: 04/16/24 2322    Order Status: Completed Specimen: Blood from Peripheral, Hand, Left Updated: 04/17/24 0715     Blood Culture, Routine No Growth to date    Narrative:      Aerobic and anaerobic    Blood culture x two cultures. Draw prior to antibiotics. [[id number]] Collected: 04/16/24 2322    Order Status: Completed Specimen: Blood from Peripheral, Hand, Right Updated: 04/17/24 0715     Blood Culture, Routine No Growth to date    Narrative:      Aerobic and anaerobic    Influenza A & B by Molecular [[id number]] Collected: 04/16/24 2347    Order Status: Completed Specimen: Nasopharyngeal Swab Updated: 04/17/24 0059     Influenza A, Molecular Negative     Influenza B, Molecular Negative     Flu A & B Source Nasal swab            I have reviewed all pertinent labs within the past 24 hours.    Estimated Creatinine Clearance: 19.7 mL/min (A) (based on SCr of 4.3 mg/dL (H)).    Diagnostic Results:  I have reviewed and interpreted all pertinent imaging results/findings within the past 24 hours.  Assessment and Plan:     Mr. Radha Abbott is a very pleasant 65 yo male with stage D HFrEF, ICMP with previous admission for ADHF/CS. He did undergo HM3 placement placed by Dr Washington on 12/1/23. Lengthy post op course complicated by vasoplegia(requiring  Giaprezza), debility, malnutrition/impaired swallowing, and renal failure requiring HD(since weaned off). Once medically stable, he was transferred to Rehab for further PT/OT/ST. Of note, pt left with permacath still in place. He has done really well in rehab and now has continued to progress well at home. He is on a very high dose of Bumex 4 mg twice daily. VAD speed is at 5000 rpm. No VAD alarms noted on interrogation occasional PI events . Today he presents to the ED for infectious workup due to complaints of fevers of 101.5F associated with fatigue, generalized weakness, lower back pain and HA. He also reports a decline in appetite. Wife changes his driveline dressing every Saturday and has not noticed any purulance with dressing changes. Body aches/weakness started around 2am last night followed by fevers around 5pm. He has been taking tylenol which has helped brng down fevers but has not helped with HA. HA is 5/10 in intensity. He reports slight increase in body weight/ fluid retention yesterday for which he took a dose of Metolazone with a drop in weight by ~5lbs. Denies any SOB, LE swelling at this time.      2D Echo with CFD done on 3/26/2024  LVAD: There is a Heartmate III LVAD running at 5000 RPM. The aortic valve does not open. The ventricular septum is at midline. Inflow cannula well seated at the apex. Outflow graft not visualized.    Left Ventricle: The left ventricle is moderately dilated. Normal wall thickness. Global hypokinesis present. Septal motion is abnormal. There is severely reduced systolic function with a visually estimated ejection fraction of 5 - 10%. There is diastolic dysfunction but grade cannot be determined.    Right Ventricle: Mild right ventricular enlargement. Wall thickness is normal. Right ventricle wall motion  is normal. Systolic function is normal. Pacemaker lead present in the ventricle.    Left Atrium: Left atrium is severely dilated.    Right Atrium: Right atrium is  moderately dilated.    Mitral Valve: The mitral valve is repaired with an Noble stitch. There is mild regurgitation.    IVC/SVC: Normal venous pressure at 3 mmHg.    * Pneumonia  Mr. Radha Abbott is a very pleasant 63 yo male with stage D HFrEF, ICMP with previous admission for ADHF/CS. He did undergo HM3 placement placed by Dr Washington on 12/1/23.      4/16/24 he presented to the ED for infectious workup due to complaints of fevers of 101.5F associated with fatigue, generalized weakness, lower back pain and HA that started this morning. No evidence of drive line infection. No history of line infection.     - COVID +ve  - CT chest/ abdomen / pelvis 4/16 showed new RML opacity  - UA clean, blood cxs done 4/16 with NGTD  - Started Vanc and Zosyn and consulting ID  - continue tylenol for fevers    Hypertension  Continue hydralazine    Headache  C/o of HA with no improvement despite tylenol  - CTH done 4/16 with no acute processes    LVAD (left ventricular assist device) present  Procedure: Device Interrogation Including analysis of device parameters  Current Settings: Ventricular Assist Device  Review of device function is stable/unstable stable    Lengthy post op course complicated by vasoplegia(requiring Giaprezza), debility, malnutrition/impaired swallowing, and renal failure requiring HD(since weaned off). Once medically stable, he was transferred to Rehab for further PT/OT/ST.        4/16/2024    10:51 PM 4/11/2024    12:23 PM 3/27/2024    11:45 AM 3/27/2024     7:39 AM 3/27/2024     5:16 AM 3/26/2024     7:10 PM 3/26/2024     4:11 PM   TXP LVAD INTERROGATIONS   Type HeartMate3  HeartMate3 HeartMate3 HeartMate3 HeartMate3 HeartMate3   Flow 4.1  3.9 4.2 4 4 4.4   Speed 5050  5200 5000 5000 5000 5000   PI 4.1  6.5 3.7 4.8 4.9 3.3   Power ([place]) 3.4  3.8 3.4 3.4 3.4 3.4   Pulsatility Pulse No Pulse          Implant Date:12/1/23     Heartmate 3 RPM 5000     INR goal: 2-3   Bridge with Heparin   Antiplatelets: None d/t  low H/H on implant     HFrEF (heart failure with reduced ejection fraction)   He is on a very high dose of Bumex 4 mg twice daily.   Appears euvolemic on exam.  Will continue home doses.  Strict I/Os    Ventricular tachycardia  H/o VT/VF arrest. [place] [id number]  Heart Transplant  [facility] - Cardiology Stepdown

## 2024-04-17 NOTE — ED NOTES
Telemetry Verification   Patient placed on Telemetry Box  Verified with War Room  Box # [id number]   Monitor Tech War room   Rate 92   Rhythm NSR

## 2024-04-17 NOTE — PROGRESS NOTES
04/17/2024  Jonathan ANAMARIA Ho Jr    Current provider:  Brayan Ocampo MD    Device interrogation:      4/17/2024     8:00 AM 4/17/2024     4:30 AM 4/17/2024     1:50 AM 4/16/2024    10:51 PM 4/11/2024    12:23 PM 3/27/2024    11:45 AM 3/27/2024     7:39 AM   TXP LVAD INTERROGATIONS   Type HeartMate3 HeartMate3 HeartMate3 HeartMate3  HeartMate3 HeartMate3   Flow 4.1 3.8 3.9 4.1  3.9 4.2   Speed 5000 5000 5000 5050  5200 5000   PI 4.5 6.3 5.7 4.1  6.5 3.7   Power ([place]) 3.4 3.4 3.4 3.4  3.8 3.4   LSL 4600 4600 4600       Pulsatility Pulse Intermittent pulse Intermittent pulse Pulse No Pulse            Rounded on [place] to ensure all mechanical assist device settings (IABP or VAD) were appropriate and all parameters were within limits.  I was able to ensure all back up equipment was present, the staff had no issues, and the Perfusion Department daily rounding was complete.      For implantable VADs: Interrogation of Ventricular assist device was performed with analysis of device parameters and review of device function. I have personally reviewed the interrogation findings and agree with findings as stated.     In emergency, the nursing units have been notified to contact the perfusion department either by:  Calling [phone] from 630am to 4pm Mon thru Fri, utilizing the On-Call Finder functionality of Epic and searching for Perfusion, or by contacting the hospital  from 4pm to 630am and on weekends and asking to speak with the perfusionist on call.    11:14 AM

## 2024-04-17 NOTE — HPI
Mr. Radha Abbott is a very pleasant 65 yo male with stage D HFrEF, ICMP with previous admission for ADHF/CS. He did undergo HM3 placement placed by Dr Washington on 12/1/23. Lengthy post op course complicated by vasoplegia(requiring Giaprezza), debility, malnutrition/impaired swallowing, and renal failure requiring HD(since weaned off). Once medically stable, he was transferred to Rehab for further PT/OT/ST. Of note, pt left with permacath still in place. He has done really well in rehab and now has continued to progress well at home. He is on a very high dose of Bumex 4 mg twice daily. VAD speed is at 5000 rpm. No VAD alarms noted on interrogation occasional PI events . Today he presents to the ED for infectious workup due to complaints of fevers of 101.5F associated with fatigue, generalized weakness, lower back pain and HA. He also reports a decline in appetite. Wife changes his driveline dressing every Saturday and has not noticed any purulance with dressing changes. Body aches/weakness started around 2am last night followed by fevers around 5pm. He has been taking tylenol which has helped brng down fevers but has not helped with HA. HA is 5/10 in intensity. He reports slight increase in body weight/ fluid retention yesterday for which he took a dose of Metolazone with a drop in weight by ~5lbs. Denies any SOB, LE swelling at this time.      2D Echo with CFD done on 3/26/2024  LVAD: There is a Heartmate III LVAD running at 5000 RPM. The aortic valve does not open. The ventricular septum is at midline. Inflow cannula well seated at the apex. Outflow graft not visualized.    Left Ventricle: The left ventricle is moderately dilated. Normal wall thickness. Global hypokinesis present. Septal motion is abnormal. There is severely reduced systolic function with a visually estimated ejection fraction of 5 - 10%. There is diastolic dysfunction but grade cannot be determined.    Right Ventricle: Mild right ventricular  enlargement. Wall thickness is normal. Right ventricle wall motion  is normal. Systolic function is normal. Pacemaker lead present in the ventricle.    Left Atrium: Left atrium is severely dilated.    Right Atrium: Right atrium is moderately dilated.    Mitral Valve: The mitral valve is repaired with an Noble stitch. There is mild regurgitation.    IVC/SVC: Normal venous pressure at 3 mmHg.

## 2024-04-17 NOTE — H&P
[facility] - Emergency Dept  Heart Transplant  H&P    Patient Name: Radha Abbott  MRN: [id number]  Admission Date: 4/16/2024  Attending Physician: Brayan Ocampo MD  Primary Care Provider: Vasu Kong MD  Principal Problem:Sepsis    Subjective:     History of Present Illness:  Mr. Radha Abbott is a very pleasant 63 yo male with stage D HFrEF, ICMP with previous admission for ADHF/CS. He did undergo HM3 placement placed by Dr Washington on 12/1/23. Lengthy post op course complicated by vasoplegia(requiring Giaprezza), debility, malnutrition/impaired swallowing, and renal failure requiring HD(since weaned off). Once medically stable, he was transferred to Rehab for further PT/OT/ST. Of note, pt left with permacath still in place. He has done really well in rehab and now has continued to progress well at home. He is on a very high dose of Bumex 4 mg twice daily. VAD speed is at 5000 rpm. No VAD alarms noted on interrogation occasional PI events . Today he presents to the ED for infectious workup due to complaints of fevers of 101.5F associated with fatigue, generalized weakness, lower back pain and HA. He also reports a decline in appetite. Wife changes his driveline dressing every Saturday and has not noticed any purulance with dressing changes. Body aches/weakness started around 2am last night followed by fevers around 5pm. He has been taking tylenol which has helped brng down fevers but has not helped with HA. HA is 5/10 in intensity. He reports slight increase in body weight/ fluid retention yesterday for which he took a dose of Metolazone with a drop in weight by ~5lbs. Denies any SOB, LE swelling at this time.      2D Echo with CFD done on 3/26/2024  LVAD: There is a Heartmate III LVAD running at 5000 RPM. The aortic valve does not open. The ventricular septum is at midline. Inflow cannula well seated at the apex. Outflow graft not visualized.    Left Ventricle: The left ventricle is moderately dilated. Normal  wall thickness. Global hypokinesis present. Septal motion is abnormal. There is severely reduced systolic function with a visually estimated ejection fraction of 5 - 10%. There is diastolic dysfunction but grade cannot be determined.    Right Ventricle: Mild right ventricular enlargement. Wall thickness is normal. Right ventricle wall motion  is normal. Systolic function is normal. Pacemaker lead present in the ventricle.    Left Atrium: Left atrium is severely dilated.    Right Atrium: Right atrium is moderately dilated.    Mitral Valve: The mitral valve is repaired with an Noble stitch. There is mild regurgitation.    IVC/SVC: Normal venous pressure at 3 mmHg.    Past Medical History:   Diagnosis Date    CAD (coronary artery disease)     CHF (congestive heart failure)     Diabetes mellitus     HFrEF (heart failure with reduced ejection fraction)     ICD (implantable cardioverter-defibrillator) in place     MI, old        Past Surgical History:   Procedure Laterality Date    ANGIOPLASTY-VENOUS ARTERY Right 12/1/2023    Procedure: ANGIOPLASTY-VENOUS ARTERY, RIGHT FEMORAL;  Surgeon: Yuri Washington MD;  [facility];  Service: Cardiovascular;  Laterality: Right;    AORTIC VALVULOPLASTY N/A 12/1/2023    Procedure: REPAIR, AORTIC VALVE;  Surgeon: Yuri Washington MD;  [facility];  Service: Cardiovascular;  Laterality: N/A;    CARDIAC SURGERY      CLOSURE N/A 12/1/2023    Procedure: CLOSURE, TEMPORARY;  Surgeon: Yuri Washington MD;  Location: [facility] OR [facility];  Service: Cardiovascular;  Laterality: N/A;    DRAINAGE OF PLEURAL EFFUSION  12/4/2023    Procedure: DRAINAGE, PLEURAL EFFUSION;  Surgeon: Yuri Washington MD;  [facility];  Service: Cardiovascular;;    INSERTION OF GRAFT TO PERICARDIUM  12/4/2023    Procedure: INSERTION, GRAFT, PERICARDIUM;  Surgeon: Yuri Washington MD;  [facility];  Service: Cardiovascular;;    INSERTION OF INTRA-AORTIC BALLOON ASSIST  DEVICE Right 11/21/2023    Procedure: INSERTION, INTRA-AORTIC BALLOON PUMP;  Surgeon: Finn Cohn MD;  Location: [facility] CATH LAB;  Service: Cardiology;  Laterality: Right;    LEFT VENTRICULAR ASSIST DEVICE Left 12/1/2023    Procedure: INSERTION-LEFT VENTRICULAR ASSIST DEVICE;  Surgeon: Yuri Washington MD;  Location: [facility] OR [facility]LR;  Service: Cardiovascular;  Laterality: Left;  REDO STERNOTOMY - REDO SAW NEEDED FOR CASE    LYSIS OF ADHESIONS  12/1/2023    Procedure: LYSIS, ADHESIONS;  Surgeon: Yuri Washington MD;  Location: [facility] OR [facility]LR;  Service: Cardiovascular;;    PLACEMENT OF SWAN ROLANDO CATHETER WITH IMAGING GUIDANCE  11/20/2023    Procedure: INSERTION, CATHETER, SWAN-ROLANDO, WITH IMAGING GUIDANCE;  Surgeon: Sajan Hurley MD;  Location: [facility] CATH LAB;  Service: Cardiology;;    REMOVAL OF TUNNELED CENTRAL VENOUS CATHETER (CVC) N/A 3/1/2024    Procedure: REMOVAL, CATHETER, CENTRAL VENOUS, TUNNELED;  Surgeon: Seble Aguilar MD;  Location: [facility] CATH LAB;  Service: Interventional Nephrology;  Laterality: N/A;    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 12/1/2023    Procedure: REPAIR, ANEURYSM, ARTERY, FEMORAL;  Surgeon: Yuri Washington MD;  [facility];  Service: Cardiovascular;  Laterality: Right;  Right Femoral Artery Repair    RIGHT HEART CATHETERIZATION Right 10/10/2023    Procedure: INSERTION, CATHETER, RIGHT HEART;  Surgeon: Bin Gandhi MD;  Location: [facility] CATH LAB;  Service: Cardiology;  Laterality: Right;    RIGHT HEART CATHETERIZATION Right 10/13/2023    Procedure: INSERTION, CATHETER, RIGHT HEART;  Surgeon: Walter Mcintyre MD;  Location: [facility] CATH LAB;  Service: Cardiology;  Laterality: Right;    RIGHT HEART CATHETERIZATION  11/13/2023    RIGHT HEART CATHETERIZATION Right 11/13/2023    Procedure: INSERTION, CATHETER, RIGHT HEART;  Surgeon: Juventino Bermudez Jr., MD;  Location: [facility] CATH LAB;  Service: Cardiology;  Laterality: Right;    RIGHT HEART CATHETERIZATION  Right 11/20/2023    Procedure: INSERTION, CATHETER, RIGHT HEART;  Surgeon: Sajan Hurley MD;  Location: [facility] CATH LAB;  Service: Cardiology;  Laterality: Right;    RIGHT HEART CATHETERIZATION Right 1/22/2024    Procedure: INSERTION, CATHETER, RIGHT HEART;  Surgeon: Brayan Ocampo MD;  Location: [facility] CATH LAB;  Service: Cardiology;  Laterality: Right;    STERNAL WOUND CLOSURE N/A 12/4/2023    Procedure: CLOSURE, WOUND, STERNUM;  Surgeon: Yuri Washington MD;  Location: [facility] OR [facility];  Service: Cardiovascular;  Laterality: N/A;    STERNOTOMY N/A 12/1/2023    Procedure: STERNOTOMY, REDO;  Surgeon: Yuri Washington MD;  [facility];  Service: Cardiovascular;  Laterality: N/A;    VALVULOPLASTY, MITRAL VALVE N/A 12/1/2023    Procedure: VALVULOPLASTY, MITRAL VALVE;  Surgeon: Yuri Washington MD;  [facility];  Service: Cardiovascular;  Laterality: N/A;       Review of patient's allergies indicates:  No Known Allergies    Current Facility-Administered Medications   Medication Dose Route Frequency Provider Last Rate Last Admin    bumetanide tablet 4 mg  4 mg Oral ED 1 Time Norm Argueta PA-C        gabapentin capsule 100 mg  100 mg Oral ED 1 Time Norm Argueta PA-C        hydrALAZINE tablet 25 mg  25 mg Oral ED 1 Time Norm Argueta PA-C        sodium bicarbonate tablet 325 mg  325 mg Oral ED 1 Time Norm Argueta PA-C         Current Outpatient Medications   Medication Sig Dispense Refill    acetaminophen (TYLENOL) 500 MG tablet Take 2 tablets (1,000 mg total) by mouth every 8 (eight) hours as needed for Pain.  0    amiodarone (PACERONE) 400 MG tablet Take 1 tablet (400 mg total) by mouth once daily. 90 tablet 3    bumetanide (BUMEX) 2 MG tablet Take 2 tablets (4 mg total) by mouth 2 (two) times a day. 120 tablet 11    famotidine (PEPCID) 20 MG tablet Take 1 tablet (20 mg total) by mouth once daily. 90 tablet 3    fluticasone propionate (FLONASE) 50 mcg/actuation nasal  spray 2 sprays (100 mcg total) by Each Nostril route once daily.  0    gabapentin (NEURONTIN) 100 MG capsule Take 1 capsule (100 mg total) by mouth 2 (two) times daily. 60 capsule 11    hydrALAZINE (APRESOLINE) 25 MG tablet Take 3 tablets (75 mg total) by mouth every 8 (eight) hours. 270 tablet 11    magnesium oxide (MAG-OX) 400 mg (241.3 mg magnesium) tablet Take 1 tablet (400 mg total) by mouth once daily. 30 tablet 11    metOLazone (ZAROXOLYN) 5 MG tablet Take 1 tablet (5 mg total) by mouth once weekly as needed on Wednesdays only if >5lb weight gain in 1 week 12 tablet 3    omega-3 acid ethyl esters (LOVAZA) 1 gram capsule Take 2 capsules (2 g total) by mouth 2 (two) times daily. 360 capsule 3    senna-docusate 8.6-50 mg (PERICOLACE) 8.6-50 mg per tablet Take 2 tablets by mouth daily as needed for Constipation. 30 tablet 11    sodium bicarbonate 325 MG tablet Take 1 tablet (325 mg total) by mouth 3 (three) times daily. 90 tablet 11    traZODone (DESYREL) 50 MG tablet Take 0.5 tablets (25 mg total) by mouth every evening. 45 tablet 3    venlafaxine (EFFEXOR) 37.5 MG Tab Take 1 tablet (37.5 mg total) by mouth once daily. 90 tablet 3    vitamin D (VITAMIN D3) 1000 units Tab Take 1 tablet (1,000 Units total) by mouth once daily. 90 tablet 3    warfarin (COUMADIN) 2.5 MG tablet Take 1 tablet (2.5 mg total) by mouth every Mon, Wed, Fri AND 1.5 tablets (3.75 mg total) every Tuesday, Thursday, Saturday, Sunday. 45 tablet 5     Family History    None       Tobacco Use    Smoking status: Former     Current packs/day: 0.50     Types: Cigarettes    Smokeless tobacco: Not on file   Substance and Sexual Activity    Alcohol use: Yes     Comment: rarely    Drug use: No    Sexual activity: Not Currently     Partners: Female     Review of Systems   Constitutional:  Positive for appetite change, chills, fatigue and fever.   HENT:          + Headache   Respiratory:  Negative for cough and chest tightness.    Cardiovascular:   Negative for chest pain, palpitations and leg swelling.   Musculoskeletal:  Positive for back pain and myalgias.   Neurological:  Positive for weakness.     Objective:     Vital Signs (Most Recent):  Temp: 98.7 °F (37.1 °C) (04/16/24 2224)  Pulse: 96 (04/16/24 2333)  Resp: 20 (04/16/24 2333)  BP: (!) 80/0 (04/16/24 2327)  SpO2: 99 % (04/16/24 2333) Vital Signs (24h Range):  Temp:  [98.7 °F (37.1 °C)] 98.7 °F (37.1 °C)  Pulse:  [91-96] 96  Resp:  [17-26] 20  SpO2:  [99 %] 99 %  BP: (80)/(0) 80/0     Patient Vitals for the past 72 hrs (Last 3 readings):   Weight   04/16/24 2224 78 kg (171 lb 15.3 oz)     Body mass index is 22.08 kg/m².    No intake or output data in the 24 hours ending 04/16/24 2345       Physical Exam  Vitals and nursing note reviewed.   Constitutional:       Appearance: He is ill-appearing.   Cardiovascular:      Rate and Rhythm: Normal rate and regular rhythm.      Pulses: Normal pulses.      Heart sounds: Normal heart sounds.      Comments: Smooth lvad hum  Pulmonary:      Effort: Pulmonary effort is normal.   Abdominal:      General: Abdomen is flat.      Palpations: Abdomen is soft.      Tenderness: There is no abdominal tenderness.   Musculoskeletal:      Right lower leg: No edema.      Left lower leg: No edema.   Skin:     General: Skin is warm.   Neurological:      Mental Status: He is alert.      Motor: Weakness present.            Significant Labs:  CBC:  Recent Labs   Lab 04/11/24  1052 04/16/24 2323   WBC 6.57 7.93   RBC 3.15* 3.13*   HGB 8.9* 9.1*   HCT 28.5* 27.1*    367   MCV 91 87   MCH 28.3 29.1   MCHC 31.2* 33.6     BNP:  Recent Labs   Lab 04/11/24  1052   *     CMP:  Recent Labs   Lab 04/11/24  1052   *   CALCIUM 9.5   ALBUMIN 3.4*   PROT 8.3   *   K 3.9   CO2 26   CL 96   BUN 72*   CREATININE 4.1*   ALKPHOS 130   ALT 77*   AST 55*   BILITOT 0.3      Coagulation:   Recent Labs   Lab 04/11/24  1052 04/16/24  2323   INR 2.7* 2.2*     LDH:  No results for  "input(s): "LDH" in the last 72 hours.  Microbiology:  Microbiology Results (last 7 days)       Procedure Component Value Units Date/Time    Blood culture x two cultures. Draw prior to antibiotics. [[id number]] Collected: 04/16/24 2322    Order Status: Sent Specimen: Blood from Peripheral, Hand, Right Updated: 04/16/24 2324    Blood culture x two cultures. Draw prior to antibiotics. [[id number]] Collected: 04/16/24 2322    Order Status: Sent Specimen: Blood from Peripheral, Hand, Left Updated: 04/16/24 2323    Influenza A & B by Molecular [[id number]]     Order Status: No result Specimen: Nasopharyngeal Swab             I have reviewed all pertinent labs within the past 24 hours.    Diagnostic Results:  I have reviewed all pertinent imaging results/findings within the past 24 hours.  I have reviewed and interpreted all pertinent imaging results/findings within the past 24 hours.    Assessment/Plan:     * Sepsis  Mr. Radha Abbott is a very pleasant 65 yo male with stage D HFrEF, ICMP with previous admission for ADHF/CS. He did undergo HM3 placement placed by Dr Washington on 12/1/23.      Today he presents to the ED for infectious workup due to complaints of fevers of 101.5F associated with fatigue, generalized weakness, lower back pain and HA that started this morning. No evidence of drive line infection. No history of line infection.     - pending COVID/ flu swab  - ordered CT chest/ abdomen / pelvis WO to assess for possible source of infection.   - UA ordered    - will hold off antibiotics for now until clear source of infection  - continue tylenol for fevers    Hypertension  Continue hydralazine    Headache  C/o of HA with no improvement despite tylenol  - plan for CT head without contrast    LVAD (left ventricular assist device) present  Procedure: Device Interrogation Including analysis of device parameters  Current Settings: Ventricular Assist Device  Review of device function is stable/unstable stable    Lengthy " post op course complicated by vasoplegia(requiring Giaprezza), debility, malnutrition/impaired swallowing, and renal failure requiring HD(since weaned off). Once medically stable, he was transferred to Rehab for further PT/OT/ST.        4/16/2024    10:51 PM 4/11/2024    12:23 PM 3/27/2024    11:45 AM 3/27/2024     7:39 AM 3/27/2024     5:16 AM 3/26/2024     7:10 PM 3/26/2024     4:11 PM   TXP LVAD INTERROGATIONS   Type HeartMate3  HeartMate3 HeartMate3 HeartMate3 HeartMate3 HeartMate3   Flow 4.1  3.9 4.2 4 4 4.4   Speed 5050  5200 5000 5000 5000 5000   PI 4.1  6.5 3.7 4.8 4.9 3.3   Power ([place]) 3.4  3.8 3.4 3.4 3.4 3.4   Pulsatility Pulse No Pulse          Implant Date:12/1/23     Heartmate 3 RPM 5000     INR goal: 2-3   Bridge with Heparin   Antiplatelets: None d/t low H/H on implant     HFrEF (heart failure with reduced ejection fraction)   He is on a very high dose of Bumex 4 mg twice daily.   Appears euvolemic on exam.  Will continue home doses.  Strict I/Os    Ventricular tachycardia  H/o VT/VF arrest. On Amiodarone        Lucy Brand MD  Heart Transplant  [facility] - Emergency Dept

## 2024-04-17 NOTE — PROGRESS NOTES
Pt arrived to room 311 via stretcher. Pt wife is at bedside. Pt is on telemetry showing NSR. VS taken and documented. Bed is in lowest position, locked, side rail up x3, and call light in reach.  Pt and pt wife instructed to call for help.        04/17/24 0148   Vital Signs   Temp 98.3 °F (36.8 °C)   Temp Source Temporal   Pulse 92   Heart Rate Source Monitor   Resp 18   SpO2 96 %   Device (Oxygen Therapy) room air   BP (!) 80/0   BP Location Right arm   BP Method Doppler   Patient Position Lying       Nurses Note -- 4 Eyes      4/17/2024   2:00 AM      Skin assessed during: Transfer      [x] No Altered Skin Integrity Present    []Prevention Measures Documented      [] Yes- Altered Skin Integrity Present or Discovered   [] LDA Added if Not in Epic (Describe Wound)   [] New Altered Skin Integrity was Present on Admit and Documented in LDA   [] Wound Image Taken    Wound Care Consulted? No    Attending Nurse:  BEAU Morris RN/Staff Member:   KYAW Corral

## 2024-04-17 NOTE — SUBJECTIVE & OBJECTIVE
Past Medical History:   Diagnosis Date    CAD (coronary artery disease)     CHF (congestive heart failure)     Diabetes mellitus     HFrEF (heart failure with reduced ejection fraction)     ICD (implantable cardioverter-defibrillator) in place     MI, old        Past Surgical History:   Procedure Laterality Date    ANGIOPLASTY-VENOUS ARTERY Right 12/1/2023    Procedure: ANGIOPLASTY-VENOUS ARTERY, RIGHT FEMORAL;  Surgeon: Yuri Washington MD;  [facility];  Service: Cardiovascular;  Laterality: Right;    AORTIC VALVULOPLASTY N/A 12/1/2023    Procedure: REPAIR, AORTIC VALVE;  Surgeon: Yuri Washington MD;  [facility];  Service: Cardiovascular;  Laterality: N/A;    CARDIAC SURGERY      CLOSURE N/A 12/1/2023    Procedure: CLOSURE, TEMPORARY;  Surgeon: Yuri Washington MD;  [facility];  Service: Cardiovascular;  Laterality: N/A;    DRAINAGE OF PLEURAL EFFUSION  12/4/2023    Procedure: DRAINAGE, PLEURAL EFFUSION;  Surgeon: Yuri Washington MD;  [facility];  Service: Cardiovascular;;    INSERTION OF GRAFT TO PERICARDIUM  12/4/2023    Procedure: INSERTION, GRAFT, PERICARDIUM;  Surgeon: Yuri Washington MD;  Location: [facility] OR [address] [place] [address];  Service: Cardiovascular;;    INSERTION OF INTRA-AORTIC BALLOON ASSIST DEVICE Right 11/21/2023    Procedure: INSERTION, INTRA-AORTIC BALLOON PUMP;  Surgeon: Finn Cohn MD;  Location: [facility] CATH LAB;  Service: Cardiology;  Laterality: Right;    LEFT VENTRICULAR ASSIST DEVICE Left 12/1/2023    Procedure: INSERTION-LEFT VENTRICULAR ASSIST DEVICE;  Surgeon: Yuri Washington MD;  Location: [facility] OR [address] [place] [address];  Service: Cardiovascular;  Laterality: Left;  REDO STERNOTOMY - REDO SAW NEEDED FOR CASE    LYSIS OF ADHESIONS  12/1/2023    Procedure: LYSIS, ADHESIONS;  Surgeon: Yuri Washington MD;  Location: [facility] OR [address] [place] [address];  Service: Cardiovascular;;    PLACEMENT OF SWAN ROLANDO CATHETER WITH IMAGING GUIDANCE  11/20/2023    Procedure: INSERTION,  CATHETER, SWAN-ROLANDO, WITH IMAGING GUIDANCE;  Surgeon: Sajan Hurley MD;  Location: [facility] CATH LAB;  Service: Cardiology;;    REMOVAL OF TUNNELED CENTRAL VENOUS CATHETER (CVC) N/A 3/1/2024    Procedure: REMOVAL, CATHETER, CENTRAL VENOUS, TUNNELED;  Surgeon: Seble Aguilar MD;  Location: [facility] CATH LAB;  Service: Interventional Nephrology;  Laterality: N/A;    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 12/1/2023    Procedure: REPAIR, ANEURYSM, ARTERY, FEMORAL;  Surgeon: Yuri Washington MD;  [facility];  Service: Cardiovascular;  Laterality: Right;  Right Femoral Artery Repair    RIGHT HEART CATHETERIZATION Right 10/10/2023    Procedure: INSERTION, CATHETER, RIGHT HEART;  Surgeon: Bin Gandhi MD;  Location: [facility] CATH LAB;  Service: Cardiology;  Laterality: Right;    RIGHT HEART CATHETERIZATION Right 10/13/2023    Procedure: INSERTION, CATHETER, RIGHT HEART;  Surgeon: Walter Mcintyre MD;  Location: [facility] CATH LAB;  Service: Cardiology;  Laterality: Right;    RIGHT HEART CATHETERIZATION  11/13/2023    RIGHT HEART CATHETERIZATION Right 11/13/2023    Procedure: INSERTION, CATHETER, RIGHT HEART;  Surgeon: Juventino Bermudez Jr., MD;  Location: [facility] CATH LAB;  Service: Cardiology;  Laterality: Right;    RIGHT HEART CATHETERIZATION Right 11/20/2023    Procedure: INSERTION, CATHETER, RIGHT HEART;  Surgeon: Sajan Hurley MD;  Location: [facility] CATH LAB;  Service: Cardiology;  Laterality: Right;    RIGHT HEART CATHETERIZATION Right 1/22/2024    Procedure: INSERTION, CATHETER, RIGHT HEART;  Surgeon: Brayan Ocampo MD;  Location: [facility] CATH LAB;  Service: Cardiology;  Laterality: Right;    STERNAL WOUND CLOSURE N/A 12/4/2023    Procedure: CLOSURE, WOUND, STERNUM;  Surgeon: Yuri Washington MD;  [facility];  Service: Cardiovascular;  Laterality: N/A;    STERNOTOMY N/A 12/1/2023    Procedure: STERNOTOMY, REDO;  Surgeon: Yuri Washington MD;  [facility];  Service:  Cardiovascular;  Laterality: N/A;    VALVULOPLASTY, MITRAL VALVE N/A 12/1/2023    Procedure: VALVULOPLASTY, MITRAL VALVE;  Surgeon: Yuri Washington MD;  Location: [facility] OR [address] [place] [address];  Service: Cardiovascular;  Laterality: N/A;       Review of patient's allergies indicates:  No Known Allergies    Current Facility-Administered Medications   Medication Dose Route Frequency Provider Last Rate Last Admin    bumetanide tablet 4 mg  4 mg Oral ED 1 Time Norm Argueta PA-C        gabapentin capsule 100 mg  100 mg Oral ED 1 Time Norm Argueta PA-C        hydrALAZINE tablet 25 mg  25 mg Oral ED 1 Time Norm Argueta PA-C        sodium bicarbonate tablet 325 mg  325 mg Oral ED 1 Time Norm Argueta PA-C         Current Outpatient Medications   Medication Sig Dispense Refill    acetaminophen (TYLENOL) 500 MG tablet Take 2 tablets (1,000 mg total) by mouth every 8 (eight) hours as needed for Pain.  0    amiodarone (PACERONE) 400 MG tablet Take 1 tablet (400 mg total) by mouth once daily. 90 tablet 3    bumetanide (BUMEX) 2 MG tablet Take 2 tablets (4 mg total) by mouth 2 (two) times a day. 120 tablet 11    famotidine (PEPCID) 20 MG tablet Take 1 tablet (20 mg total) by mouth once daily. 90 tablet 3    fluticasone propionate (FLONASE) 50 mcg/actuation nasal spray 2 sprays (100 mcg total) by Each Nostril route once daily.  0    gabapentin (NEURONTIN) 100 MG capsule Take 1 capsule (100 mg total) by mouth 2 (two) times daily. 60 capsule 11    hydrALAZINE (APRESOLINE) 25 MG tablet Take 3 tablets (75 mg total) by mouth every 8 (eight) hours. 270 tablet 11    magnesium oxide (MAG-OX) 400 mg (241.3 mg magnesium) tablet Take 1 tablet (400 mg total) by mouth once daily. 30 tablet 11    metOLazone (ZAROXOLYN) 5 MG tablet Take 1 tablet (5 mg total) by mouth once weekly as needed on Wednesdays only if >5lb weight gain in 1 week 12 tablet 3    omega-3 acid ethyl esters (LOVAZA) 1 gram capsule Take 2 capsules (2 g total) by mouth  2 (two) times daily. 360 capsule 3    senna-docusate 8.6-50 mg (PERICOLACE) 8.6-50 mg per tablet Take 2 tablets by mouth daily as needed for Constipation. 30 tablet 11    sodium bicarbonate 325 MG tablet Take 1 tablet (325 mg total) by mouth 3 (three) times daily. 90 tablet 11    traZODone (DESYREL) 50 MG tablet Take 0.5 tablets (25 mg total) by mouth every evening. 45 tablet 3    venlafaxine (EFFEXOR) 37.5 MG Tab Take 1 tablet (37.5 mg total) by mouth once daily. 90 tablet 3    vitamin D (VITAMIN D3) 1000 units Tab Take 1 tablet (1,000 Units total) by mouth once daily. 90 tablet 3    warfarin (COUMADIN) 2.5 MG tablet Take 1 tablet (2.5 mg total) by mouth every Mon, Wed, Fri AND 1.5 tablets (3.75 mg total) every Tuesday, Thursday, Saturday, Sunday. 45 tablet 5     Family History    None       Tobacco Use    Smoking status: Former     Current packs/day: 0.50     Types: Cigarettes    Smokeless tobacco: Not on file   Substance and Sexual Activity    Alcohol use: Yes     Comment: rarely    Drug use: No    Sexual activity: Not Currently     Partners: Female     Review of Systems   Constitutional:  Positive for appetite change, chills, fatigue and fever.   HENT:          + Headache   Respiratory:  Negative for cough and chest tightness.    Cardiovascular:  Negative for chest pain, palpitations and leg swelling.   Musculoskeletal:  Positive for back pain and myalgias.   Neurological:  Positive for weakness.     Objective:     Vital Signs (Most Recent):  Temp: 98.7 °F (37.1 °C) (04/16/24 2224)  Pulse: 96 (04/16/24 2333)  Resp: 20 (04/16/24 2333)  BP: (!) 80/0 (04/16/24 2327)  SpO2: 99 % (04/16/24 2333) Vital Signs (24h Range):  Temp:  [98.7 °F (37.1 °C)] 98.7 °F (37.1 °C)  Pulse:  [91-96] 96  Resp:  [17-26] 20  SpO2:  [99 %] 99 %  BP: (80)/(0) 80/0     Patient Vitals for the past 72 hrs (Last 3 readings):   Weight   04/16/24 2224 78 kg (171 lb 15.3 oz)     Body mass index is 22.08 kg/m².    No intake or output data in the  "24 hours ending 04/16/24 2345       Physical Exam  Vitals and nursing note reviewed.   Constitutional:       Appearance: He is ill-appearing.   Cardiovascular:      Rate and Rhythm: Normal rate and regular rhythm.      Pulses: Normal pulses.      Heart sounds: Normal heart sounds.      Comments: Smooth lvad hum  Pulmonary:      Effort: Pulmonary effort is normal.   Abdominal:      General: Abdomen is flat.      Palpations: Abdomen is soft.      Tenderness: There is no abdominal tenderness.   Musculoskeletal:      Right lower leg: No edema.      Left lower leg: No edema.   Skin:     General: Skin is warm.   Neurological:      Mental Status: He is alert.      Motor: Weakness present.            Significant Labs:  CBC:  Recent Labs   Lab 04/11/24  1052 04/16/24 2323   WBC 6.57 7.93   RBC 3.15* 3.13*   HGB 8.9* 9.1*   HCT 28.5* 27.1*    367   MCV 91 87   MCH 28.3 29.1   MCHC 31.2* 33.6     BNP:  Recent Labs   Lab 04/11/24  1052   *     CMP:  Recent Labs   Lab 04/11/24  1052   *   CALCIUM 9.5   ALBUMIN 3.4*   PROT 8.3   *   K 3.9   CO2 26   CL 96   BUN 72*   CREATININE 4.1*   ALKPHOS 130   ALT 77*   AST 55*   BILITOT 0.3      Coagulation:   Recent Labs   Lab 04/11/24  1052 04/16/24 2323   INR 2.7* 2.2*     LDH:  No results for input(s): "LDH" in the last 72 hours.  Microbiology:  Microbiology Results (last 7 days)       Procedure Component Value Units Date/Time    Blood culture x two cultures. Draw prior to antibiotics. [[id number]] Collected: 04/16/24 2322    Order Status: Sent Specimen: Blood from Peripheral, Hand, Right Updated: 04/16/24 2324    Blood culture x two cultures. Draw prior to antibiotics. [[id number]] Collected: 04/16/24 2322    Order Status: Sent Specimen: Blood from Peripheral, Hand, Left Updated: 04/16/24 2323    Influenza A & B by Molecular [[id number]]     Order Status: No result Specimen: Nasopharyngeal Swab             I have reviewed all pertinent labs within the past " 24 hours.    Diagnostic Results:  I have reviewed all pertinent imaging results/findings within the past 24 hours.  I have reviewed and interpreted all pertinent imaging results/findings within the past 24 hours.

## 2024-04-17 NOTE — PT/OT/SLP EVAL
Occupational Therapy   Co-Evaluation  Co-treatment performed due to patient's multiple deficits requiring two skilled therapists to appropriately and safely assess patient's strength and endurance while facilitating functional tasks in addition to accommodating for patient's activity tolerance.      Name: Radha Abbott  MRN: [id number]  Admitting Diagnosis: Pneumonia  Recent Surgery: * No surgery found *      Recommendations:     Discharge Recommendations: Low Intensity Therapy  Discharge Equipment Recommendations:  walker, rolling  Barriers to discharge:  None    Assessment:     Radha Abbott is a 64 y.o. male with a medical diagnosis of Pneumonia.  He presents with the following performance deficits affecting function: weakness, impaired endurance, impaired self care skills, impaired functional mobility, gait instability, impaired balance, decreased coordination, decreased upper extremity function, decreased lower extremity function, impaired cardiopulmonary response to activity, impaired coordination. Pt agreeable to therapy and tolerated well. He required minimum-stand by assist for all functional tasks completed this day without incident. Pt was primarily limited by generalized weakness, impaired tolerance to activity, and instability. Pt would continue to benefit from skilled OT services to maximize functional independence with ADLs and functional mobility, reduce caregiver burden, and facilitate safe discharge in the least restrictive environment.  Patient continues to demonstrate the need for low intensity therapy on a scheduled basis exhibited by decreased independence with self-care and functional mobility    Patient demonstrates a mobility limitation that significantly impairs their ability to participate in one or more mobility related activities of daily living. Patient's mobility limitation cannot be sufficiently resolved with the use of a cane, but can be sufficiently resolved with the use of a rolling  walker.The use of a rolling walker will considerably improve their ability to participate in MRADLs. Patient will use the walker on a regular basis at home.       Rehab Prognosis: Good; patient would benefit from acute skilled OT services to address these deficits and reach maximum level of function.       Plan:     Patient to be seen 4 x/week to address the above listed problems via self-care/home management, therapeutic activities, therapeutic exercises, neuromuscular re-education  Plan of Care Expires: 05/17/24  Plan of Care Reviewed with: patient, spouse    Subjective     Chief Complaint: weakness  Patient/Family Comments/goals: to return to PLOF    Occupational Profile:  Living Environment: Pt lives with his spouse in a [facility] with threshold. T/s combo. BSC over toilet  Previous level of function: Mod (I) with ADLs and functional mobility. Per spouse, pt has had a decline and required (A) for ADLs and fx'l mobility ~3 days prior to admit 2/2 weakness  Roles and Routines: (+) drives and enjoys doing yardwork; able to manage LVAD equipment (I)  Equipment Used at Home: grab bar, shower chair, rollator, other (see comments) (BSC over toilet)  Assistance upon Discharge: spouse    Pain/Comfort:  Pain Rating 1: 0/10  Pain Rating Post-Intervention 1: 0/10    Patients cultural, spiritual, Taoism conflicts given the current situation: no    Objective:   Additional staff present:  JOSETTE Luevano    Communicated with: RN prior to session.  Patient found HOB elevated with LVAD, peripheral IV, telemetry upon OT entry to room.    General Precautions: Standard, fall, LVAD, airborne, contact, droplet  Orthopedic Precautions: N/A  Braces: N/A  Respiratory Status: Room air    Occupational Performance:    Bed Mobility:    Patient completed Scooting/Bridging with contact guard assistance  Patient completed Supine to Sit with minimum assistance  Patient completed Sit to Supine with stand by assistance    Functional  Mobility/Transfers:  Patient completed Sit <> Stand Transfer with minimum assistance  with  rolling walker   Functional Mobility: Pt engaged in functional mobility to simulate household/community distances 8 ft fwd/back and 4 side steps to the right along EOB with CGA and RW in order to maximize functional endurance and standing balance required for engagement in occupations of choice   No overt LOB, unsteady gait initially noted  No SOB noted    Activities of Daily Living:  Feeding:  set up (A) to open fruit cup lid  Lower Body Dressing: contact guard assistance for functional balance as pt pulled socks above b/l heels sitting edge of bed    Cognitive/Visual Perceptual:  Cognitive/Psychosocial Skills:     -       Oriented to: Person, Place, Time, and Situation   -       Follows Commands/attention:Follows two-step commands  -       Safety awareness/insight to disability: intact   -       Mood/Affect/Coping skills/emotional control: Cooperative    Physical Exam:  Sensation:    -       Intact  Upper Extremity Range of Motion:     -       Right Upper Extremity: WFL  -       Left Upper Extremity: WFL  Upper Extremity Strength:    -       Right Upper Extremity: WFL  -       Left Upper Extremity: WFL  Fine Motor Coordination:    -       Intact    AMPAC 6 Click ADL:  AMPAC Total Score: 17    Treatment & Education:  -Education on energy conservation and task modification to maximize safety and (I) during ADLs and mobility  -Education on importance of OOB activity to improve overall activity tolerance and promote recovery  -Pt educated to call for assistance and to transfer with hospital staff or spouse only  -Provided education regarding role of OT, POC, & discharge recommendations with pt and spouse verbalizing understanding.  Pt had no further questions & when asked whether there were any concerns pt reported none.     Patient left HOB elevated with all lines intact, call button in reach, RN notified, and spouse  present    GOALS:   Multidisciplinary Problems       Occupational Therapy Goals          Problem: Occupational Therapy    Goal Priority Disciplines Outcome Interventions   Occupational Therapy Goal     OT, PT/OT Ongoing, Progressing    Description: Goals to be met by: 5/17/24     Patient will increase functional independence with ADLs by performing:    UE Dressing with Modified [place].  LE Dressing with Modified [place].  Grooming while standing at sink with Modified [place].  Toileting from toilet/bedside commode with Modified [place] for hygiene and clothing management.   Toilet transfer to toilet/bedside commode with Modified [place].                         History:     Past Medical History:   Diagnosis Date    CAD (coronary artery disease)     CHF (congestive heart failure)     Diabetes mellitus     HFrEF (heart failure with reduced ejection fraction)     ICD (implantable cardioverter-defibrillator) in place     MI, old          Past Surgical History:   Procedure Laterality Date    ANGIOPLASTY-VENOUS ARTERY Right 12/1/2023    Procedure: ANGIOPLASTY-VENOUS ARTERY, RIGHT FEMORAL;  Surgeon: Yuri Washington MD;  Location: [facility] OR [address] [place] [address];  Service: Cardiovascular;  Laterality: Right;    AORTIC VALVULOPLASTY N/A 12/1/2023    Procedure: REPAIR, AORTIC VALVE;  Surgeon: Yuri Washington MD;  [facility];  Service: Cardiovascular;  Laterality: N/A;    CARDIAC SURGERY      CLOSURE N/A 12/1/2023    Procedure: CLOSURE, TEMPORARY;  Surgeon: Yuri Washington MD;  [facility];  Service: Cardiovascular;  Laterality: N/A;    DRAINAGE OF PLEURAL EFFUSION  12/4/2023    Procedure: DRAINAGE, PLEURAL EFFUSION;  Surgeon: Yuri Washington MD;  [facility];  Service: Cardiovascular;;    INSERTION OF GRAFT TO PERICARDIUM  12/4/2023    Procedure: INSERTION, GRAFT, PERICARDIUM;  Surgeon: Yuri Washington MD;  [facility];  Service: Cardiovascular;;    INSERTION  OF INTRA-AORTIC BALLOON ASSIST DEVICE Right 11/21/2023    Procedure: INSERTION, INTRA-AORTIC BALLOON PUMP;  Surgeon: Finn Cohn MD;  Location: [facility] CATH LAB;  Service: Cardiology;  Laterality: Right;    LEFT VENTRICULAR ASSIST DEVICE Left 12/1/2023    Procedure: INSERTION-LEFT VENTRICULAR ASSIST DEVICE;  Surgeon: Yuri Washington MD;  [facility];  Service: Cardiovascular;  Laterality: Left;  REDO STERNOTOMY - REDO SAW NEEDED FOR CASE    LYSIS OF ADHESIONS  12/1/2023    Procedure: LYSIS, ADHESIONS;  Surgeon: Yuri Washington MD;  [facility];  Service: Cardiovascular;;    PLACEMENT OF SWAN ROLANDO CATHETER WITH IMAGING GUIDANCE  11/20/2023    Procedure: INSERTION, CATHETER, SWAN-ROLANDO, WITH IMAGING GUIDANCE;  Surgeon: Sajan Hurley MD;  Location: [facility] CATH LAB;  Service: Cardiology;;    REMOVAL OF TUNNELED CENTRAL VENOUS CATHETER (CVC) N/A 3/1/2024    Procedure: REMOVAL, CATHETER, CENTRAL VENOUS, TUNNELED;  Surgeon: Seble Aguilar MD;  Location: [facility] CATH LAB;  Service: Interventional Nephrology;  Laterality: N/A;    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 12/1/2023    Procedure: REPAIR, ANEURYSM, ARTERY, FEMORAL;  Surgeon: Yuri Washington MD;  [facility];  Service: Cardiovascular;  Laterality: Right;  Right Femoral Artery Repair    RIGHT HEART CATHETERIZATION Right 10/10/2023    Procedure: INSERTION, CATHETER, RIGHT HEART;  Surgeon: Bin Gandhi MD;  Location: [facility] CATH LAB;  Service: Cardiology;  Laterality: Right;    RIGHT HEART CATHETERIZATION Right 10/13/2023    Procedure: INSERTION, CATHETER, RIGHT HEART;  Surgeon: Walter Mcintyre MD;  Location: [facility] CATH LAB;  Service: Cardiology;  Laterality: Right;    RIGHT HEART CATHETERIZATION  11/13/2023    RIGHT HEART CATHETERIZATION Right 11/13/2023    Procedure: INSERTION, CATHETER, RIGHT HEART;  Surgeon: Juventino Bermudez Jr., MD;  Location: [facility] CATH LAB;  Service: Cardiology;  Laterality: Right;     RIGHT HEART CATHETERIZATION Right 11/20/2023    Procedure: INSERTION, CATHETER, RIGHT HEART;  Surgeon: Sajan Hurley MD;  Location[facility];  Service: Cardiology;  Laterality: Right;    RIGHT HEART CATHETERIZATION Right 1/22/2024    Procedure: INSERTION, CATHETER, RIGHT HEART;  Surgeon: Brayan Ocampo MD;  Location: [facility] CATH LAB;  Service: Cardiology;  Laterality: Right;    STERNAL WOUND CLOSURE N/A 12/4/2023    Procedure: CLOSURE, WOUND, STERNUM;  Surgeon: Yuri Washington MD;  [facility];  Service: Cardiovascular;  Laterality: N/A;    STERNOTOMY N/A 12/1/2023    Procedure: STERNOTOMY, REDO;  Surgeon: Yuri Washington MD;  [facility];  Service: Cardiovascular;  Laterality: N/A;    VALVULOPLASTY, MITRAL VALVE N/A 12/1/2023    Procedure: VALVULOPLASTY, MITRAL VALVE;  Surgeon: Yuri Washington MD;  [facility];  Service: Cardiovascular;  Laterality: N/A;       Time Tracking:     OT Date of Treatment: 04/17/24  OT Start Time: 0905  OT Stop Time: 0928  OT Total Time (min): 23 min    Billable Minutes:Evaluation 13  Self Care/Home Management 10    4/17/2024

## 2024-04-17 NOTE — PROGRESS NOTES
"Pharmacokinetic Initial Assessment: IV Vancomycin    Assessment/Plan:    Initiate intravenous vancomycin with loading dose of 1500 mg once with subsequent doses when random concentrations are less than 20 mcg/mL  Desired empiric serum trough concentration is 10 to 20 mcg/mL  Draw vancomycin random level on 4/18 at 0500.  Pharmacy will continue to follow and monitor vancomycin.      Please contact pharmacy at extension [phone] with any questions regarding this assessment.     Thank you for the consult,   Alee Rogers       Patient brief summary:  Radha Abbott is a 64 y.o. male initiated on antimicrobial therapy with IV Vancomycin for treatment of suspected lower respiratory infection    Drug Allergies:   Review of patient's allergies indicates:  No Known Allergies    Actual Body Weight:   78 kg    Renal Function:   Estimated Creatinine Clearance: 19.7 mL/min (A) (based on SCr of 4.3 mg/dL (H)).,       CBC (last 72 hours):  Recent Labs   Lab Result Units 04/16/24  2323   WBC K/uL 7.93   Hemoglobin g/dL 9.1*   Hematocrit % 27.1*   Platelets K/uL 367   Gran % % 89.1*   Lymph % % 4.3*   Mono % % 5.4   Eosinophil % % 0.3   Basophil % % 0.3   Differential Method  Automated       Metabolic Panel (last 72 hours):  Recent Labs   Lab Result Units 04/17/24  0030   Sodium mmol/L 134*   Potassium mmol/L 4.0   Chloride mmol/L 95   CO2 mmol/L 23   Glucose mg/dL 179*   BUN mg/dL 76*   Creatinine mg/dL 4.3*   Albumin g/dL 3.0*   Total Bilirubin mg/dL 0.4   Alkaline Phosphatase U/L 127   AST U/L 76*   ALT U/L 88*       Drug levels (last 3 results):  No results for input(s): "VANCOMYCINRA", "VANCORANDOM", "VANCOMYCINPE", "VANCOPEAK", "VANCOMYCINTR", "VANCOTROUGH" in the last 72 hours.    Microbiologic Results:  Microbiology Results (last 7 days)       Procedure Component Value Units Date/Time    Influenza A & B by Molecular [[id number]] Collected: 04/16/24 5159    Order Status: Completed Specimen: Nasopharyngeal Swab Updated: 04/17/24 " 0059     Influenza A, Molecular Negative     Influenza B, Molecular Negative     Flu A & B Source Nasal swab    Blood culture x two cultures. Draw prior to antibiotics. [[id number]] Collected: 04/16/24 2322    Order Status: Sent Specimen: Blood from Peripheral, Hand, Right Updated: 04/16/24 2324    Blood culture x two cultures. Draw prior to antibiotics. [[id number]] Collected: 04/16/24 2322    Order Status: Sent Specimen: Blood from Peripheral, Hand, Left Updated: 04/16/24 2323

## 2024-04-17 NOTE — HPI
Reason for Consult: Management of T2DM, Hyperglycemia      Surgical Procedure and Date: n/a     Diabetes diagnosis year: 1998     Home Diabetes Medications:  Patient has not been taking any medications for diabetes since October 2023    How often checking glucose at home? Checking infrequently   BG readings on regimen: 120-150's  Hypoglycemia on the regimen?  No  Missed doses on regimen?  n/a     Diabetes Complications include:     Hyperglycemia     Complicating diabetes co morbidities:   CAD s/p CABG, HTN, HLD        HPI: Mr. Radha Abbott is a very pleasant 65 yo male with stage D HFrEF, ICMP with previous admission for ADHF/CS. He did undergo HM3 placement placed by Dr Washington on 12/1/23. Lengthy post op course complicated by vasoplegia(requiring Giaprezza), debility, malnutrition/impaired swallowing, and renal failure requiring HD(since weaned off). Once medically stable, he was transferred to Rehab for further PT/OT/ST. Of note, pt left with permacath still in place. He has done really well in rehab and now has continued to progress well at home. He is on a very high dose of Bumex 4 mg twice daily. VAD speed is at 5000 rpm. No VAD alarms noted on interrogation occasional PI events . Today he presents to the ED for infectious workup due to complaints of fevers of 101.5F associated with fatigue, generalized weakness, lower back pain and HA. He also reports a decline in appetite. Wife changes his driveline dressing every Saturday and has not noticed any purulance with dressing changes. Body aches/weakness started around 2am last night followed by fevers around 5pm. He has been taking tylenol which has helped brng down fevers but has not helped with HA. HA is 5/10 in intensity. He reports slight increase in body weight/ fluid retention yesterday for which he took a dose of Metolazone with a drop in weight by ~5lbs. Denies any SOB, LE swelling at this time. Endocrine consulted to manage hyperglycemia and type 2  diabetes.     Hemoglobin A1C   Date Value Ref Range Status   04/17/2024 7.4 (H) 4.0 - 5.6 % Final     Comment:     ADA Screening Guidelines:  5.7-6.4%  Consistent with prediabetes  >or=6.5%  Consistent with diabetes    High levels of fetal hemoglobin interfere with the HbA1C  assay. Heterozygous hemoglobin variants (HbS, HgC, etc)do  not significantly interfere with this assay.   However, presence of multiple variants may affect accuracy.     01/13/2024 5.1 4.0 - 5.6 % Final     Comment:     ADA Screening Guidelines:  5.7-6.4%  Consistent with prediabetes  >or=6.5%  Consistent with diabetes    High levels of fetal hemoglobin interfere with the HbA1C  assay. Heterozygous hemoglobin variants (HbS, HgC, etc)do  not significantly interfere with this assay.   However, presence of multiple variants may affect accuracy.     10/12/2023 7.6 (H) 4.0 - 5.6 % Final     Comment:     ADA Screening Guidelines:  5.7-6.4%  Consistent with prediabetes  >or=6.5%  Consistent with diabetes    High levels of fetal hemoglobin interfere with the HbA1C  assay. Heterozygous hemoglobin variants (HbS, HgC, etc)do  not significantly interfere with this assay.   However, presence of multiple variants may affect accuracy.

## 2024-04-17 NOTE — CONSULTS
[facility] - Cardiology Stepdown  Endocrinology  Diabetes Consult Note    Consult Requested by: Brayan Ocampo MD   Reason for admit: Pneumonia    HISTORY OF PRESENT ILLNESS:  Reason for Consult: Management of T2DM, Hyperglycemia      Surgical Procedure and Date: n/a     Diabetes diagnosis year: 1998     Home Diabetes Medications:  Patient has not been taking any medications for diabetes since October 2023    How often checking glucose at home? Checking infrequently   BG readings on regimen: 120-150's  Hypoglycemia on the regimen?  No  Missed doses on regimen?  n/a     Diabetes Complications include:     Hyperglycemia     Complicating diabetes co morbidities:   CAD s/p CABG, HTN, HLD        HPI: Mr. Radha Abbott is a very pleasant 63 yo male with stage D HFrEF, ICMP with previous admission for ADHF/CS. He did undergo HM3 placement placed by Dr Washington on 12/1/23. Lengthy post op course complicated by vasoplegia(requiring Giaprezza), debility, malnutrition/impaired swallowing, and renal failure requiring HD(since weaned off). Once medically stable, he was transferred to Rehab for further PT/OT/ST. Of note, pt left with permacath still in place. He has done really well in rehab and now has continued to progress well at home. He is on a very high dose of Bumex 4 mg twice daily. VAD speed is at 5000 rpm. No VAD alarms noted on interrogation occasional PI events . Today he presents to the ED for infectious workup due to complaints of fevers of 101.5F associated with fatigue, generalized weakness, lower back pain and HA. He also reports a decline in appetite. Wife changes his driveline dressing every Saturday and has not noticed any purulance with dressing changes. Body aches/weakness started around 2am last night followed by fevers around 5pm. He has been taking tylenol which has helped brng down fevers but has not helped with HA. HA is 5/10 in intensity. He reports slight increase in body weight/ fluid retention yesterday  for which he took a dose of Metolazone with a drop in weight by ~5lbs. Denies any SOB, LE swelling at this time. Endocrine consulted to manage hyperglycemia and type 2 diabetes.     Hemoglobin A1C   Date Value Ref Range Status   2024 7.4 (H) 4.0 - 5.6 % Final     Comment:     ADA Screening Guidelines:  5.7-6.4%  Consistent with prediabetes  >or=6.5%  Consistent with diabetes    High levels of fetal hemoglobin interfere with the HbA1C  assay. Heterozygous hemoglobin variants (HbS, HgC, etc)do  not significantly interfere with this assay.   However, presence of multiple variants may affect accuracy.     2024 5.1 4.0 - 5.6 % Final     Comment:     ADA Screening Guidelines:  5.7-6.4%  Consistent with prediabetes  >or=6.5%  Consistent with diabetes    High levels of fetal hemoglobin interfere with the HbA1C  assay. Heterozygous hemoglobin variants (HbS, HgC, etc)do  not significantly interfere with this assay.   However, presence of multiple variants may affect accuracy.     10/12/2023 7.6 (H) 4.0 - 5.6 % Final     Comment:     ADA Screening Guidelines:  5.7-6.4%  Consistent with prediabetes  >or=6.5%  Consistent with diabetes    High levels of fetal hemoglobin interfere with the HbA1C  assay. Heterozygous hemoglobin variants (HbS, HgC, etc)do  not significantly interfere with this assay.   However, presence of multiple variants may affect accuracy.           Interval HPI:   Overnight events: No acute events overnight. Patient in room 311/311 A. Blood glucose stable. BG at and above goal on current insulin regimen (SSI ). Steroid use- None.    Renal function- Abnormal - Creatinine 4.3   Vasopressors-  None       Endocrine will continue to follow and manage insulin orders inpatient.       Diet Renal Coumadin Restriction; Fluid - 1500mL; Isolation Tray - Regular [place]     Eatin%  Nausea: No  Hypoglycemia and intervention: No  Fever: No  TPN and/or TF: No    PMH, PSH, FH, SH updated and reviewed  "    ROS:  Review of Systems   Constitutional:  Negative for unexpected weight change.   Eyes:  Negative for visual disturbance.   Respiratory:  Negative for cough.    Cardiovascular:  Negative for chest pain.   Gastrointestinal:  Negative for nausea and vomiting.   Endocrine: Negative for polydipsia and polyuria.   Musculoskeletal:  Negative for back pain.   Skin:  Negative for rash.   Neurological:  Negative for syncope.   Psychiatric/Behavioral:  Negative for agitation and dysphoric mood.        Current Medications and/or Treatments Impacting Glycemic Control  Immunotherapy:    Immunosuppressants       None          Steroids:   Hormones (From admission, onward)      None          Pressors:    Autonomic Drugs (From admission, onward)      None          Hyperglycemia/Diabetes Medications:   Antihyperglycemics (From admission, onward)      Start     Stop Route Frequency Ordered    04/17/24 0738  insulin aspart U-100 pen 0-5 Units         -- SubQ Before meals & nightly PRN 04/17/24 0638             PHYSICAL EXAMINATION:  Vitals:    04/17/24 1200   BP: (!) 70/0   Pulse: 93   Resp: 18   Temp: 99.2 °F (37.3 °C)     Body mass index is 21.71 kg/m².     Physical Exam    Physical exam deferred due to COVID-19.     Labs Reviewed and Include   Recent Labs   Lab 04/17/24  0409   *   CALCIUM 9.5   ALBUMIN 3.2*   PROT 8.2   *   K 3.9   CO2 25   CL 94*   BUN 82*   CREATININE 4.3*   ALKPHOS 143*   *   AST 88*   BILITOT 0.5     Lab Results   Component Value Date    WBC 6.92 04/17/2024    HGB 9.3 (L) 04/17/2024    HCT 29.6 (L) 04/17/2024    MCV 89 04/17/2024     04/17/2024     No results for input(s): "TSH", "FREET4" in the last 168 hours.  Lab Results   Component Value Date    HGBA1C 7.4 (H) 04/17/2024       Nutritional status:   Body mass index is 21.71 kg/m².  Lab Results   Component Value Date    ALBUMIN 3.2 (L) 04/17/2024    ALBUMIN 3.0 (L) 04/17/2024    ALBUMIN 3.4 (L) 04/11/2024     Lab Results "   Component Value Date    PREALBUMIN 20 04/17/2024    PREALBUMIN 34 04/11/2024    PREALBUMIN 29 03/25/2024       Estimated Creatinine Clearance: 19.3 mL/min (A) (based on SCr of 4.3 mg/dL (H)).    Accu-Checks  Recent Labs     04/17/24  0854 04/17/24  1210   POCTGLUCOSE 145* 190*        ASSESSMENT and PLAN    Pulmonary  * Pneumonia  Infection may elevate BG readings  On IV antibiotics  Managed per primary team  Avoid hypoglycemia        Cardiac/Vascular  LVAD (left ventricular assist device) present  Managed per primary team  Avoid hypoglycemia        Hypertension  Uncontrolled HTN can worsen insulin resistance.     Endocrine  Type 2 diabetes mellitus without complication, without long-term current use of insulin  Endocrinology consulted for BG management.   BG goal 140-180    - Novolog (Insulin Aspart) prn for BG excursions LDC SSI (150/50)  - BG checks AC/HS  - Hypoglycemia protocol in place    ** Please notify Endocrine for any change and/or advance in diet**  ** Please call Endocrine for any BG related issues **    Discharge Planning:   TBD. Please notify endocrinology prior to discharge.            Plan discussed with patient, family, and RN at bedside.        Erasmo Parrish, DNP, FNP  Endocrinology  [facility] - Cardiology Stepdown

## 2024-04-17 NOTE — ED PROVIDER NOTES
Encounter Date: 4/16/2024       History     Chief Complaint   Patient presents with    Weakness     With fever for 1.5 days; LVAD pt      64-year-old male presents to the emergency department for evaluation of fever.  Patient has an LVAD since December of 2023.  He is on the transplant list.  History provided by patient and wife.  They report that yesterday the patient began to feel unwell.  Generalized malaise and fatigue.  Normal p.o. intake.  Today the patient had a fever, 101.5° F treated with Tylenol, 500 mg at 6:00 p.m..  Afebrile at this time.  Denies any upper respiratory infectious symptoms, no chest pain, no shortness a breath.  No dizziness but does report being lightheaded.      Review of patient's allergies indicates:  No Known Allergies  Past Medical History:   Diagnosis Date    CAD (coronary artery disease)     CHF (congestive heart failure)     Diabetes mellitus     HFrEF (heart failure with reduced ejection fraction)     ICD (implantable cardioverter-defibrillator) in place     MI, old      Past Surgical History:   Procedure Laterality Date    ANGIOPLASTY-VENOUS ARTERY Right 12/1/2023    Procedure: ANGIOPLASTY-VENOUS ARTERY, RIGHT FEMORAL;  Surgeon: Yuri Washington MD;  Location: [address];  Service: Cardiovascular;  Laterality: Right;    AORTIC VALVULOPLASTY N/A 12/1/2023    Procedure: REPAIR, AORTIC VALVE;  Surgeon: Yuri Washington MD;  Location: [address];  Service: Cardiovascular;  Laterality: N/A;    CARDIAC SURGERY      CLOSURE N/A 12/1/2023    Procedure: CLOSURE, TEMPORARY;  Surgeon: Yuri Washington MD;  Location: [address];  Service: Cardiovascular;  Laterality: N/A;    DRAINAGE OF PLEURAL EFFUSION  12/4/2023    Procedure: DRAINAGE, PLEURAL EFFUSION;  Surgeon: Yuri Washington MD;  Location: [address];  Service: Cardiovascular;;    INSERTION OF GRAFT TO PERICARDIUM  12/4/2023    Procedure: INSERTION, GRAFT, PERICARDIUM;  Surgeon: Yuri Washington MD;  Location: [facility]  2ND FLR;  Service: Cardiovascular;;    INSERTION OF INTRA-AORTIC BALLOON ASSIST DEVICE Right 11/21/2023    Procedure: INSERTION, INTRA-AORTIC BALLOON PUMP;  Surgeon: Finn Cohn MD;  Location: [facility] CATH LAB;  Service: Cardiology;  Laterality: Right;    LEFT VENTRICULAR ASSIST DEVICE Left 12/1/2023    Procedure: INSERTION-LEFT VENTRICULAR ASSIST DEVICE;  Surgeon: Yuri Washington MD;  Location: [facility] OR 2ND FLR;  Service: Cardiovascular;  Laterality: Left;  REDO STERNOTOMY - REDO SAW NEEDED FOR CASE    LYSIS OF ADHESIONS  12/1/2023    Procedure: LYSIS, ADHESIONS;  Surgeon: Yuri Washington MD;  Location: [facility] OR 2ND FLR;  Service: Cardiovascular;;    PLACEMENT OF SWAN ROLANDO CATHETER WITH IMAGING GUIDANCE  11/20/2023    Procedure: INSERTION, CATHETER, SWAN-ROLANDO, WITH IMAGING GUIDANCE;  Surgeon: Sajan Hurley MD;  Location: [facility] CATH LAB;  Service: Cardiology;;    REMOVAL OF TUNNELED CENTRAL VENOUS CATHETER (CVC) N/A 3/1/2024    Procedure: REMOVAL, CATHETER, CENTRAL VENOUS, TUNNELED;  Surgeon: Seble Aguilar MD;  Location: [facility] CATH LAB;  Service: Interventional Nephrology;  Laterality: N/A;    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 12/1/2023    Procedure: REPAIR, ANEURYSM, ARTERY, FEMORAL;  Surgeon: Yuri Washington MD;  Location: [facility] OR 2ND FLR;  Service: Cardiovascular;  Laterality: Right;  Right Femoral Artery Repair    RIGHT HEART CATHETERIZATION Right 10/10/2023    Procedure: INSERTION, CATHETER, RIGHT HEART;  Surgeon: Bin Gandhi MD;  Location: [facility] CATH LAB;  Service: Cardiology;  Laterality: Right;    RIGHT HEART CATHETERIZATION Right 10/13/2023    Procedure: INSERTION, CATHETER, RIGHT HEART;  Surgeon: Walter Mcintyre MD;  Location: [facility] CATH LAB;  Service: Cardiology;  Laterality: Right;    RIGHT HEART CATHETERIZATION  11/13/2023    RIGHT HEART CATHETERIZATION Right 11/13/2023    Procedure: INSERTION, CATHETER, RIGHT HEART;  Surgeon: Juventino Bermudez Jr., MD;  Location: [facility] CATH  LAB;  Service: Cardiology;  Laterality: Right;    RIGHT HEART CATHETERIZATION Right 11/20/2023    Procedure: INSERTION, CATHETER, RIGHT HEART;  Surgeon: Sajan Hurley MD;  Location: [facility] CATH LAB;  Service: Cardiology;  Laterality: Right;    RIGHT HEART CATHETERIZATION Right 1/22/2024    Procedure: INSERTION, CATHETER, RIGHT HEART;  Surgeon: Brayan Ocampo MD;  Location: [facility] CATH LAB;  Service: Cardiology;  Laterality: Right;    STERNAL WOUND CLOSURE N/A 12/4/2023    Procedure: CLOSURE, WOUND, STERNUM;  Surgeon: Yuri Washington MD;  [facility];  Service: Cardiovascular;  Laterality: N/A;    STERNOTOMY N/A 12/1/2023    Procedure: STERNOTOMY, REDO;  Surgeon: Yuri Washington MD;  [facility];  Service: Cardiovascular;  Laterality: N/A;    VALVULOPLASTY, MITRAL VALVE N/A 12/1/2023    Procedure: VALVULOPLASTY, MITRAL VALVE;  Surgeon: Yuri Washington MD;  [facility];  Service: Cardiovascular;  Laterality: N/A;     No family history on file.  Social History     Tobacco Use    Smoking status: Former     Current packs/day: 0.50     Types: Cigarettes   Substance Use Topics    Alcohol use: Yes     Comment: rarely    Drug use: No     Review of Systems   Constitutional:  Positive for fever.   HENT:  Negative for sore throat.    Respiratory:  Negative for shortness of breath.    Cardiovascular:  Negative for chest pain.   Gastrointestinal:  Negative for nausea.   Genitourinary:  Negative for dysuria.   Musculoskeletal:  Negative for back pain.   Skin:  Negative for rash.   Neurological:  Negative for weakness.   Hematological:  Does not bruise/bleed easily.       Physical Exam     Initial Vitals   BP Pulse Resp Temp SpO2   04/16/24 2327 04/16/24 2224 04/16/24 2224 04/16/24 2224 04/16/24 2224   (!) 80/0 96 17 98.7 °F (37.1 °C) 99 %      MAP       --                Physical Exam    Constitutional: Vital signs are normal. He appears well-developed and well-nourished.   HENT:    Head: Normocephalic and atraumatic.   Right Ear: Hearing normal.   Left Ear: Hearing normal.   Eyes: Conjunctivae are normal.   Neck:   Normal range of motion   Cardiovascular:  Normal rate and regular rhythm.           Pulmonary/Chest:   Clear on exam    Driveline insertion site is clear   Abdominal: Abdomen is soft. Bowel sounds are normal.   Soft, not tender, not distended   Musculoskeletal:         General: Normal range of motion.     Neurological: He is alert and oriented to person, place, and time.   Normal exam   Skin: Skin is warm and intact.   No rashes or lesions   Psychiatric: He has a normal mood and affect. His speech is normal and behavior is normal. Cognition and memory are normal.         ED Course   Critical Care    Date/Time: 4/16/2024 11:49 PM    Performed by: Norm Argueta PA-C  Authorized by: Jes Hopper MD  Direct patient critical care time: 10 minutes  Additional history critical care time: 5 minutes  Ordering / reviewing critical care time: 5 minutes  Documentation critical care time: 7 minutes  Consulting other physicians critical care time: 7 minutes  Total critical care time (exclusive of procedural time) : 34 minutes  Critical care was necessary to treat or prevent imminent or life-threatening deterioration of the following conditions: cardiac failure.  Critical care was time spent personally by me on the following activities: examination of patient, ordering and review of laboratory studies, review of old charts and re-evaluation of patient's condition.        Labs Reviewed   CBC W/ AUTO DIFFERENTIAL - Abnormal; Notable for the following components:       Result Value    RBC 3.13 (*)     Hemoglobin 9.1 (*)     Hematocrit 27.1 (*)     RDW 17.5 (*)     Immature Granulocytes 0.6 (*)     Immature Grans (Abs) 0.05 (*)     Lymph # 0.3 (*)     Gran % 89.1 (*)     Lymph % 4.3 (*)     All other components within normal limits   PROTIME-INR - Abnormal; Notable for the following  components:    Prothrombin Time 22.9 (*)     INR 2.2 (*)     All other components within normal limits   CULTURE, BLOOD   CULTURE, BLOOD   INFLUENZA A & B BY MOLECULAR   COMPREHENSIVE METABOLIC PANEL   URINALYSIS, REFLEX TO URINE CULTURE   PROCALCITONIN   LACTATE DEHYDROGENASE   SARS-COV-2 RNA AMPLIFICATION, QUAL          Imaging Results              X-Ray Chest AP Portable (In process)  Result time 04/16/24 23:48:49                     Medications   hydrALAZINE tablet 25 mg (has no administration in time range)   bumetanide tablet 4 mg (has no administration in time range)   gabapentin capsule 100 mg (has no administration in time range)   sodium bicarbonate tablet 325 mg (has no administration in time range)     Medical Decision Making  64-year-old male with LVAD presenting with fever.  Reassuring exam, time   Differential:  Bacteremia, pneumonia, driveline infection.      Plan:   Chest x-ray, septic workup, reassessment    11:50 p.m. assessment  Labs pending.  Heart transplant has evaluated the patient and ED. the will admit the patient to their service.  Septic workup pending.  Afebrile at this time    Amount and/or Complexity of Data Reviewed  Labs: ordered.  Radiology: ordered and independent interpretation performed.     Details: No Pneumonia    Risk  OTC drugs.  Prescription drug management.                                      Clinical Impression:  Final diagnoses:  [R53.1] Weakness  [R50.9] Fever                 Norm Argueta PA-C  04/16/24 5811

## 2024-04-18 LAB
ANION GAP SERPL CALC-SCNC: 17 MMOL/L (ref 8–16)
APTT PPP: 46.1 SEC (ref 21–32)
ASCENDING AORTA: 2.63 CM
BASOPHILS # BLD AUTO: 0.04 K/UL (ref 0–0.2)
BASOPHILS NFR BLD: 0.4 % (ref 0–1.9)
BSA FOR ECHO PROCEDURE: 1.98 M2
BUN SERPL-MCNC: 86 MG/DL (ref 8–23)
CALCIUM SERPL-MCNC: 9.1 MG/DL (ref 8.7–10.5)
CHLORIDE SERPL-SCNC: 93 MMOL/L (ref 95–110)
CO2 SERPL-SCNC: 25 MMOL/L (ref 23–29)
CREAT SERPL-MCNC: 5 MG/DL (ref 0.5–1.4)
CV ECHO LV RWT: 0.24 CM
DIFFERENTIAL METHOD BLD: ABNORMAL
DOP CALC LVOT AREA: 3.3 CM2
DOP CALC LVOT DIAMETER: 2.04 CM
E WAVE DECELERATION TIME: 149.62 MSEC
E/A RATIO: 1.12
E/E' RATIO: 21.25 M/S
ECHO LV POSTERIOR WALL: 0.72 CM (ref 0.6–1.1)
EOSINOPHIL # BLD AUTO: 0 K/UL (ref 0–0.5)
EOSINOPHIL NFR BLD: 0 % (ref 0–8)
ERYTHROCYTE [DISTWIDTH] IN BLOOD BY AUTOMATED COUNT: 16.6 % (ref 11.5–14.5)
EST. GFR  (NO RACE VARIABLE): 12.3 ML/MIN/1.73 M^2
FRACTIONAL SHORTENING: 3 % (ref 28–44)
GLUCOSE SERPL-MCNC: 138 MG/DL (ref 70–110)
HCT VFR BLD AUTO: 30.6 % (ref 40–54)
HGB BLD-MCNC: 9.7 G/DL (ref 14–18)
IMM GRANULOCYTES # BLD AUTO: 0.08 K/UL (ref 0–0.04)
IMM GRANULOCYTES NFR BLD AUTO: 0.7 % (ref 0–0.5)
INR PPP: 1.9 (ref 0.8–1.2)
INTERVENTRICULAR SEPTUM: 0.78 CM (ref 0.6–1.1)
LA MAJOR: 6.64 CM
LA MINOR: 6.54 CM
LA WIDTH: 5.69 CM
LDH SERPL L TO P-CCNC: 729 U/L (ref 110–260)
LEFT ATRIUM SIZE: 4.56 CM
LEFT ATRIUM VOLUME INDEX MOD: 63.4 ML/M2
LEFT ATRIUM VOLUME INDEX: 72.7 ML/M2
LEFT ATRIUM VOLUME MOD: 126.84 CM3
LEFT ATRIUM VOLUME: 145.33 CM3
LEFT INTERNAL DIMENSION IN SYSTOLE: 5.9 CM (ref 2.1–4)
LEFT VENTRICLE DIASTOLIC VOLUME INDEX: 93.35 ML/M2
LEFT VENTRICLE DIASTOLIC VOLUME: 186.69 ML
LEFT VENTRICLE MASS INDEX: 88 G/M2
LEFT VENTRICLE SYSTOLIC VOLUME INDEX: 86.5 ML/M2
LEFT VENTRICLE SYSTOLIC VOLUME: 172.91 ML
LEFT VENTRICULAR INTERNAL DIMENSION IN DIASTOLE: 6.1 CM (ref 3.5–6)
LEFT VENTRICULAR MASS: 176.98 G
LV LATERAL E/E' RATIO: 17 M/S
LV SEPTAL E/E' RATIO: 28.33 M/S
LVAD BASE RATE: 5000 RPM
LYMPHOCYTES # BLD AUTO: 0.4 K/UL (ref 1–4.8)
LYMPHOCYTES NFR BLD: 3.6 % (ref 18–48)
MAGNESIUM SERPL-MCNC: 1.8 MG/DL (ref 1.6–2.6)
MCH RBC QN AUTO: 28.2 PG (ref 27–31)
MCHC RBC AUTO-ENTMCNC: 31.7 G/DL (ref 32–36)
MCV RBC AUTO: 89 FL (ref 82–98)
MONOCYTES # BLD AUTO: 0.8 K/UL (ref 0.3–1)
MONOCYTES NFR BLD: 6.7 % (ref 4–15)
MV PEAK A VEL: 0.76 M/S
MV PEAK E VEL: 0.85 M/S
MV STENOSIS PRESSURE HALF TIME: 43.39 MS
MV VALVE AREA P 1/2 METHOD: 5.07 CM2
NEUTROPHILS # BLD AUTO: 10 K/UL (ref 1.8–7.7)
NEUTROPHILS NFR BLD: 88.6 % (ref 38–73)
NRBC BLD-RTO: 0 /100 WBC
OHS CV RV/LV RATIO: 0.63 CM
PHOSPHATE SERPL-MCNC: 5 MG/DL (ref 2.7–4.5)
PISA MRMAX VEL: 0.03 M/S
PLATELET # BLD AUTO: 310 K/UL (ref 150–450)
PMV BLD AUTO: 9.8 FL (ref 9.2–12.9)
POCT GLUCOSE: 142 MG/DL (ref 70–110)
POCT GLUCOSE: 149 MG/DL (ref 70–110)
POCT GLUCOSE: 155 MG/DL (ref 70–110)
POCT GLUCOSE: 209 MG/DL (ref 70–110)
POTASSIUM SERPL-SCNC: 4 MMOL/L (ref 3.5–5.1)
PROTHROMBIN TIME: 20.4 SEC (ref 9–12.5)
RA MAJOR: 5 CM
RA PRESSURE ESTIMATED: 3 MMHG
RA WIDTH: 3.72 CM
RBC # BLD AUTO: 3.44 M/UL (ref 4.6–6.2)
RIGHT VENTRICULAR END-DIASTOLIC DIMENSION: 3.86 CM
SINUS: 3.3 CM
SODIUM SERPL-SCNC: 135 MMOL/L (ref 136–145)
STJ: 2.64 CM
TDI LATERAL: 0.05 M/S
TDI SEPTAL: 0.03 M/S
TDI: 0.04 M/S
TRICUSPID ANNULAR PLANE SYSTOLIC EXCURSION: 0.93 CM
VANCOMYCIN SERPL-MCNC: 23.1 UG/ML
WBC # BLD AUTO: 11.26 K/UL (ref 3.9–12.7)
Z-SCORE OF LEFT VENTRICULAR DIMENSION IN END DIASTOLE: 0.45
Z-SCORE OF LEFT VENTRICULAR DIMENSION IN END SYSTOLE: 3.95

## 2024-04-18 PROCEDURE — 99233 SBSQ HOSP IP/OBS HIGH 50: CPT | Mod: ,,, | Performed by: INTERNAL MEDICINE

## 2024-04-18 PROCEDURE — 27000248 HC VAD-ADDITIONAL DAY

## 2024-04-18 PROCEDURE — 83735 ASSAY OF MAGNESIUM: CPT | Performed by: STUDENT IN AN ORGANIZED HEALTH CARE EDUCATION/TRAINING PROGRAM

## 2024-04-18 PROCEDURE — 20600001 HC STEP DOWN PRIVATE ROOM

## 2024-04-18 PROCEDURE — 85025 COMPLETE CBC W/AUTO DIFF WBC: CPT | Performed by: STUDENT IN AN ORGANIZED HEALTH CARE EDUCATION/TRAINING PROGRAM

## 2024-04-18 PROCEDURE — 99233 SBSQ HOSP IP/OBS HIGH 50: CPT | Mod: 95,,, | Performed by: NURSE PRACTITIONER

## 2024-04-18 PROCEDURE — 63600175 PHARM REV CODE 636 W HCPCS: Performed by: STUDENT IN AN ORGANIZED HEALTH CARE EDUCATION/TRAINING PROGRAM

## 2024-04-18 PROCEDURE — 83615 LACTATE (LD) (LDH) ENZYME: CPT | Performed by: STUDENT IN AN ORGANIZED HEALTH CARE EDUCATION/TRAINING PROGRAM

## 2024-04-18 PROCEDURE — 25000003 PHARM REV CODE 250: Performed by: INTERNAL MEDICINE

## 2024-04-18 PROCEDURE — 80202 ASSAY OF VANCOMYCIN: CPT | Performed by: INTERNAL MEDICINE

## 2024-04-18 PROCEDURE — 63600175 PHARM REV CODE 636 W HCPCS: Performed by: NURSE PRACTITIONER

## 2024-04-18 PROCEDURE — 93750 INTERROGATION VAD IN PERSON: CPT | Mod: ,,, | Performed by: INTERNAL MEDICINE

## 2024-04-18 PROCEDURE — 27000207 HC ISOLATION

## 2024-04-18 PROCEDURE — 85730 THROMBOPLASTIN TIME PARTIAL: CPT | Performed by: STUDENT IN AN ORGANIZED HEALTH CARE EDUCATION/TRAINING PROGRAM

## 2024-04-18 PROCEDURE — 63600175 PHARM REV CODE 636 W HCPCS: Performed by: INTERNAL MEDICINE

## 2024-04-18 PROCEDURE — 87040 BLOOD CULTURE FOR BACTERIA: CPT | Performed by: INTERNAL MEDICINE

## 2024-04-18 PROCEDURE — 25000003 PHARM REV CODE 250: Performed by: NURSE PRACTITIONER

## 2024-04-18 PROCEDURE — 84100 ASSAY OF PHOSPHORUS: CPT | Performed by: STUDENT IN AN ORGANIZED HEALTH CARE EDUCATION/TRAINING PROGRAM

## 2024-04-18 PROCEDURE — 99232 SBSQ HOSP IP/OBS MODERATE 35: CPT | Mod: ,,, | Performed by: NURSE PRACTITIONER

## 2024-04-18 PROCEDURE — 25000003 PHARM REV CODE 250: Performed by: STUDENT IN AN ORGANIZED HEALTH CARE EDUCATION/TRAINING PROGRAM

## 2024-04-18 PROCEDURE — 85610 PROTHROMBIN TIME: CPT | Performed by: STUDENT IN AN ORGANIZED HEALTH CARE EDUCATION/TRAINING PROGRAM

## 2024-04-18 PROCEDURE — 80048 BASIC METABOLIC PNL TOTAL CA: CPT | Performed by: STUDENT IN AN ORGANIZED HEALTH CARE EDUCATION/TRAINING PROGRAM

## 2024-04-18 RX ORDER — ONDANSETRON HYDROCHLORIDE 2 MG/ML
4 INJECTION, SOLUTION INTRAVENOUS ONCE
Status: COMPLETED | OUTPATIENT
Start: 2024-04-18 | End: 2024-04-18

## 2024-04-18 RX ORDER — WARFARIN 4 MG/1
4 TABLET ORAL ONCE
Status: COMPLETED | OUTPATIENT
Start: 2024-04-18 | End: 2024-04-18

## 2024-04-18 RX ORDER — WARFARIN 2.5 MG/1
2.5 TABLET ORAL
Status: DISCONTINUED | OUTPATIENT
Start: 2024-04-19 | End: 2024-04-21

## 2024-04-18 RX ORDER — LANOLIN ALCOHOL/MO/W.PET/CERES
400 CREAM (GRAM) TOPICAL ONCE
Status: COMPLETED | OUTPATIENT
Start: 2024-04-18 | End: 2024-04-18

## 2024-04-18 RX ADMIN — SODIUM BICARBONATE 325 MG: 325 TABLET ORAL at 03:04

## 2024-04-18 RX ADMIN — FAMOTIDINE 20 MG: 20 TABLET ORAL at 09:04

## 2024-04-18 RX ADMIN — BUMETANIDE 4 MG: 1 TABLET ORAL at 10:04

## 2024-04-18 RX ADMIN — WARFARIN SODIUM 4 MG: 4 TABLET ORAL at 04:04

## 2024-04-18 RX ADMIN — ACETAMINOPHEN 1000 MG: 500 TABLET ORAL at 04:04

## 2024-04-18 RX ADMIN — Medication 400 MG: at 09:04

## 2024-04-18 RX ADMIN — AMPICILLIN 2 G: 2 INJECTION, POWDER, FOR SOLUTION INTRAMUSCULAR; INTRAVENOUS at 11:04

## 2024-04-18 RX ADMIN — ONDANSETRON 4 MG: 2 INJECTION INTRAMUSCULAR; INTRAVENOUS at 05:04

## 2024-04-18 RX ADMIN — AMIODARONE HYDROCHLORIDE 400 MG: 200 TABLET ORAL at 09:04

## 2024-04-18 RX ADMIN — REMDESIVIR 200 MG: 100 INJECTION, POWDER, LYOPHILIZED, FOR SOLUTION INTRAVENOUS at 12:04

## 2024-04-18 RX ADMIN — VENLAFAXINE 37.5 MG: 37.5 TABLET ORAL at 09:04

## 2024-04-18 RX ADMIN — HYDRALAZINE HYDROCHLORIDE 75 MG: 50 TABLET ORAL at 03:04

## 2024-04-18 RX ADMIN — TRAZODONE HYDROCHLORIDE 25 MG: 50 TABLET ORAL at 10:04

## 2024-04-18 RX ADMIN — HYDRALAZINE HYDROCHLORIDE 75 MG: 50 TABLET ORAL at 05:04

## 2024-04-18 RX ADMIN — BUMETANIDE 4 MG: 1 TABLET ORAL at 09:04

## 2024-04-18 RX ADMIN — HYDRALAZINE HYDROCHLORIDE 75 MG: 50 TABLET ORAL at 10:04

## 2024-04-18 RX ADMIN — ACETAMINOPHEN 1000 MG: 500 TABLET ORAL at 05:04

## 2024-04-18 RX ADMIN — SODIUM BICARBONATE 325 MG: 325 TABLET ORAL at 09:04

## 2024-04-18 RX ADMIN — PIPERACILLIN SODIUM AND TAZOBACTAM SODIUM 4.5 G: 4; .5 INJECTION, POWDER, FOR SOLUTION INTRAVENOUS at 09:04

## 2024-04-18 RX ADMIN — SODIUM BICARBONATE 325 MG: 325 TABLET ORAL at 10:04

## 2024-04-18 NOTE — SUBJECTIVE & OBJECTIVE
"Interval HPI:   Overnight events: No acute events overnight. Patient in room 311/311 A. Blood glucose stable. BG at goal on current insulin regimen (SSI ). Steroid use- None.    Renal function- Abnormal - Creatinine 4.3   Vasopressors-  None       Endocrine will continue to follow and manage insulin orders inpatient.         Diet Renal Coumadin Restriction; Fluid - 1500mL; Isolation Tray - Regular [place]     Eatin%  Nausea: No  Hypoglycemia and intervention: No  Fever: No  TPN and/or TF: No    BP 93/66   Pulse 97   Temp 100.2 °F (37.9 °C) (Oral)   Resp 18   Ht 6' 2" (1.88 m)   Wt 76.7 kg (169 lb 1.5 oz)   SpO2 (!) 93%   BMI 21.71 kg/m²     Labs Reviewed and Include    No results for input(s): "GLU", "CALCIUM", "ALBUMIN", "PROT", "NA", "K", "CO2", "CL", "BUN", "CREATININE", "ALKPHOS", "ALT", "AST", "BILITOT" in the last 24 hours.  Lab Results   Component Value Date    WBC 6.92 2024    HGB 9.3 (L) 2024    HCT 29.6 (L) 2024    MCV 89 2024     2024     No results for input(s): "TSH", "FREET4" in the last 168 hours.  Lab Results   Component Value Date    HGBA1C 7.4 (H) 2024       Nutritional status:   Body mass index is 21.71 kg/m².  Lab Results   Component Value Date    ALBUMIN 3.2 (L) 2024    ALBUMIN 3.0 (L) 2024    ALBUMIN 3.4 (L) 2024     Lab Results   Component Value Date    PREALBUMIN 20 2024    PREALBUMIN 34 2024    PREALBUMIN 29 2024       Estimated Creatinine Clearance: 19.3 mL/min (A) (based on SCr of 4.3 mg/dL (H)).    Accu-Checks  Recent Labs     24  0854 24  1210 24  1644 24   POCTGLUCOSE 145* 190* 102 154*       Current Medications and/or Treatments Impacting Glycemic Control  Immunotherapy:    Immunosuppressants       None          Steroids:   Hormones (From admission, onward)      None          Pressors:    Autonomic Drugs (From admission, onward)      None      "     Hyperglycemia/Diabetes Medications:   Antihyperglycemics (From admission, onward)      Start     Stop Route Frequency Ordered    04/17/24 0738  insulin aspart U-100 pen 0-5 Units         -- SubQ Before meals & nightly PRN 04/17/24 0638

## 2024-04-18 NOTE — PROGRESS NOTES
[facility] - [facility]iology Stepdown  Heart Transplant  Progress Note    Patient Name: Radha Abbott  MRN: [id number]  Admission Date: 4/16/2024  Hospital Length of Stay: 2 days  Attending Physician: Brayan Ocampo MD  Primary Care Provider: Vasu Kong MD  Principal Problem:Pneumonia due to COVID-19 virus    Subjective:   Interval History: Still feels weak and generally achy. N/V overnight. T max past 24 hours 100.9. Appreciate ID's help. Three day course of Remdesivir started today. Oxygenating well on RA. Blood cxs from 4/16 are +ve for E faecalis. Vanc and Zosyn continue but Vanc level 23.1 and sCr has bumped from 4.3 to 5.0 so holding today's dose. Given E faecalis bacteremia, will get TTE to R/O endocarditis, ICD lead infections.      Continuous Infusions:  Current Facility-Administered Medications   Medication Dose Route Frequency Last Rate Last Admin     Scheduled Meds:  Current Facility-Administered Medications   Medication Dose Route Frequency    amiodarone  400 mg Oral Daily    bumetanide  4 mg Oral BID    famotidine  20 mg Oral Daily    hydrALAZINE  75 mg Oral Q8H    magnesium oxide  400 mg Oral Daily    ondansetron  4 mg Intravenous Once    piperacillin-tazobactam (Zosyn) IV (PEDS and ADULTS) (extended infusion is not appropriate)  4.5 g Intravenous Q12H    [START ON 4/19/2024] remdesivir infusion  100 mg Intravenous Daily    sodium bicarbonate  325 mg Oral TID    traZODone  25 mg Oral QHS    venlafaxine  37.5 mg Oral Daily    [START ON 4/19/2024] warfarin  2.5 mg Oral Once per day on Sunday Monday Wednesday Friday Saturday    And    [START ON 4/19/2024] warfarin  3.75 mg Oral Every Tues, Thurs    warfarin  4 mg Oral Once     PRN Meds:  Current Facility-Administered Medications:     acetaminophen, 1,000 mg, Oral, Q8H PRN    dextrose 10%, 12.5 g, Intravenous, PRN    dextrose 10%, 25 g, Intravenous, PRN    glucagon (human recombinant), 1 mg, Intramuscular, PRN    glucose, 16 g, Oral, PRN    glucose, 24  g, Oral, PRN    insulin aspart U-100, 0-5 Units, Subcutaneous, QID (AC + HS) PRN    senna-docusate 8.6-50 mg, 2 tablet, Oral, Daily PRN    Pharmacy to dose Vancomycin consult, , , Once **AND** vancomycin - pharmacy to dose, , Intravenous, pharmacy to manage frequency    Review of patient's allergies indicates:  No Known Allergies  Objective:     Vital Signs (Most Recent):  Temp: 98.2 °F (36.8 °C) (04/18/24 1155)  Pulse: 78 (04/18/24 1400)  Resp: 18 (04/18/24 1155)  BP: (!) 82/0 (04/18/24 1240)  SpO2: 95 % (04/18/24 1155) Vital Signs (24h Range):  Temp:  [98.2 °F (36.8 °C)-100.9 °F (38.3 °C)] 98.2 °F (36.8 °C)  Pulse:  [] 78  Resp:  [18-19] 18  SpO2:  [93 %-98 %] 95 %  BP: (62-93)/(0-70) 82/0     Patient Vitals for the past 72 hrs (Last 3 readings):   Weight   04/18/24 1240 75 kg (165 lb 5.5 oz)   04/18/24 0913 75.2 kg (165 lb 12.6 oz)   04/17/24 1200 76.7 kg (169 lb 1.5 oz)     Body mass index is 21.23 kg/m².      Intake/Output Summary (Last 24 hours) at 4/18/2024 1423  Last data filed at 4/18/2024 0516  Gross per 24 hour   Intake 600 ml   Output 800 ml   Net -200 ml       Hemodynamic Parameters:       Telemetry: SR with PVC's       Physical Exam  Constitutional:       Appearance: He is ill-appearing.   HENT:      Head: Normocephalic and atraumatic.   Eyes:      Conjunctiva/sclera: Conjunctivae normal.      Pupils: Pupils are equal, round, and reactive to light.   Neck:      Comments: Do not appreciate elevated JVP  Cardiovascular:      Rate and Rhythm: Normal rate and regular rhythm.      Comments: Smooth VAD hum  Pulmonary:      Effort: Pulmonary effort is normal.      Comments: Breath sounds are decreased right base. Lungs essentially CTA bilaterally  Abdominal:      General: Bowel sounds are normal.      Palpations: Abdomen is soft.   Musculoskeletal:         General: No swelling. Normal range of motion.      Cervical back: Normal range of motion and neck supple.   Skin:     General: Skin is warm and dry.       Capillary Refill: Capillary refill takes 2 to 3 seconds.   Neurological:      General: No focal deficit present.      Mental Status: He is alert and oriented to person, place, and time.            Significant Labs:  CBC:  Recent Labs   Lab 04/16/24 2323 04/17/24 0409 04/18/24  0534   WBC 7.93 6.92 11.26   RBC 3.13* 3.34* 3.44*   HGB 9.1* 9.3* 9.7*   HCT 27.1* 29.6* 30.6*    366 310   MCV 87 89 89   MCH 29.1 27.8 28.2   MCHC 33.6 31.4* 31.7*     BNP:  Recent Labs   Lab 04/17/24 0409   *     CMP:  Recent Labs   Lab 04/17/24 0030 04/17/24 0409 04/18/24  0534   * 117* 138*   CALCIUM 8.9 9.5 9.1   ALBUMIN 3.0* 3.2*  --    PROT 7.7 8.2  --    * 134* 135*   K 4.0 3.9 4.0   CO2 23 25 25   CL 95 94* 93*   BUN 76* 82* 86*   CREATININE 4.3* 4.3* 5.0*   ALKPHOS 127 143*  --    ALT 88* 101*  --    AST 76* 88*  --    BILITOT 0.4 0.5  --       Coagulation:   Recent Labs   Lab 04/16/24 2323 04/17/24 0409 04/18/24  0534   INR 2.2* 2.3* 1.9*   APTT  --  43.6* 46.1*     LDH:  Recent Labs   Lab 04/16/24 2323 04/17/24 0409 04/18/24  0534   * 461* 729*     Microbiology:  Microbiology Results (last 7 days)       Procedure Component Value Units Date/Time    Blood culture x two cultures. Draw prior to antibiotics. [[id number]]  (Abnormal) Collected: 04/16/24 2322    Order Status: Completed Specimen: Blood from Peripheral, Hand, Right Updated: 04/18/24 0903     Blood Culture, Routine Gram stain aer bottle: Gram positive cocci      Gram stain nata bottle: Gram positive cocci      Positive results previously called 04/17/2024 15:08      ENTEROCOCCUS SPECIES  Identification pending  For susceptibility see order #[id number]      Narrative:      Aerobic and anaerobic    Blood culture x two cultures. Draw prior to antibiotics. [[id number]]  (Abnormal) Collected: 04/16/24 4603    Order Status: Completed Specimen: Blood from Peripheral, Hand, Left Updated: 04/18/24 0901     Blood Culture, Routine Gram  stain aer bottle: Gram positive cocci      Gram stain nata bottle: Gram positive cocci      Results called to and read back by: Rachael Horan RN 04/17/2024  15:08      ENTEROCOCCUS SPECIES  Identification and susceptibility pending      Narrative:      Aerobic and anaerobic    Blood culture [[id number]] Collected: 04/18/24 0535    Order Status: Sent Specimen: Blood Updated: 04/18/24 0544    Blood culture [[id number]] Collected: 04/18/24 0534    Order Status: Sent Specimen: Blood Updated: 04/18/24 0544    Rapid Organism ID by PCR (from Blood culture) [[id number]]  (Abnormal) Collected: 04/16/24 2322    Order Status: Completed Updated: 04/17/24 1726     Enterococcus faecalis Detected     Enterococcus faecium Not Detected     Listeria monocytogenes Not Detected     Staphylococcus spp. Not Detected     Staphylococcus aureus Not Detected     Staphylococcus epidermidis Not Detected     Staphylococcus lugdunensis Not Detected     Streptococcus species Not Detected     Streptococcus agalactiae Not Detected     Streptococcus pneumoniae Not Detected     Streptococcus pyogenes Not Detected     Acinetobacter calcoaceticus/baumannii complex Not Detected     Bacteroides fragilis Not Detected     Enterobacterales Not Detected     Enterobacter cloacae complex Not Detected     Escherichia coli Not Detected     Klebsiella aerogenes Not Detected     Klebsiella oxytoca Not Detected     Klebsiella pneumoniae group Not Detected     Proteus Not Detected     Salmonella sp Not Detected     Serratia marcescens Not Detected     Haemophilus influenzae Not Detected     Neisseria meningtidis Not Detected     Pseudomonas aeruginosa Not Detected     Stenotrophomonas maltophilia Not Detected     Candida albicans Not Detected     Candida auris Not Detected     Candida glabrata Not Detected     Candida krusei Not Detected     Candida parapsilosis Not Detected     Candida tropicalis Not Detected     Cryptococcus neoformans/gattii Not Detected     CTX-M  (ESBL ) Test Not Applicable     IMP (Carbapenem resistant) Test Not Applicable     KPC resistance gene (Carbapenem resistant) Test Not Applicable     mcr-1  Test Not Applicable     mec A/C  Test Not Applicable     mec A/C and MREJ (MRSA) gene Test Not Applicable     NDM (Carbapenem resistant) Test Not Applicable     OXA-48-like (Carbapenem resistant) Test Not Applicable     van A/B (VRE gene) Not Detected     VIM (Carbapenem resistant) Test Not Applicable    Narrative:      Aerobic and anaerobic    Influenza A & B by Molecular [[id number]] Collected: 04/16/24 5457    Order Status: Completed Specimen: Nasopharyngeal Swab Updated: 04/17/24 0059     Influenza A, Molecular Negative     Influenza B, Molecular Negative     Flu A & B Source Nasal swab            I have reviewed all pertinent labs within the past 24 hours.    Estimated Creatinine Clearance: 16.3 mL/min (A) (based on SCr of 5 mg/dL (H)).    Diagnostic Results:  I have reviewed and interpreted all pertinent imaging results/findings within the past 24 hours.  Assessment and Plan:     Mr. Radha Abbott is a very pleasant 63 yo male with stage D HFrEF, ICMP with previous admission for ADHF/CS. He did undergo HM3 placement placed by Dr Washington on 12/1/23. Lengthy post op course complicated by vasoplegia(requiring Giaprezza), debility, malnutrition/impaired swallowing, and renal failure requiring HD(since weaned off). Once medically stable, he was transferred to Rehab for further PT/OT/ST. Of note, pt left with permacath still in place. He has done really well in rehab and now has continued to progress well at home. He is on a very high dose of Bumex 4 mg twice daily. VAD speed is at 5000 rpm. No VAD alarms noted on interrogation occasional PI events . Today he presents to the ED for infectious workup due to complaints of fevers of 101.5F associated with fatigue, generalized weakness, lower back pain and HA. He also reports a decline in appetite. Wife changes  his driveline dressing every Saturday and has not noticed any purulance with dressing changes. Body aches/weakness started around 2am last night followed by fevers around 5pm. He has been taking tylenol which has helped brng down fevers but has not helped with HA. HA is 5/10 in intensity. He reports slight increase in body weight/ fluid retention yesterday for which he took a dose of Metolazone with a drop in weight by ~5lbs. Denies any SOB, LE swelling at this time.      2D Echo with CFD done on 3/26/2024  LVAD: There is a Heartmate III LVAD running at 5000 RPM. The aortic valve does not open. The ventricular septum is at midline. Inflow cannula well seated at the apex. Outflow graft not visualized.    Left Ventricle: The left ventricle is moderately dilated. Normal wall thickness. Global hypokinesis present. Septal motion is abnormal. There is severely reduced systolic function with a visually estimated ejection fraction of 5 - 10%. There is diastolic dysfunction but grade cannot be determined.    Right Ventricle: Mild right ventricular enlargement. Wall thickness is normal. Right ventricle wall motion  is normal. Systolic function is normal. Pacemaker lead present in the ventricle.    Left Atrium: Left atrium is severely dilated.    Right Atrium: Right atrium is moderately dilated.    Mitral Valve: The mitral valve is repaired with an Noble stitch. There is mild regurgitation.    IVC/SVC: Normal venous pressure at 3 mmHg.    * Pneumonia due to COVID-19 virus  Mr. Radha Abbott is a very pleasant 65 yo male with stage D HFrEF, ICMP with previous admission for ADHF/CS. He did undergo HM3 placement placed by Dr Washington on 12/1/23.      4/16/24 he presented to the ED for infectious workup due to complaints of fevers of 101.5F associated with fatigue, generalized weakness, lower back pain and HA that started this morning. No evidence of drive line infection. No history of line infection.     - COVID +ve  - CT chest/  abdomen / pelvis 4/16 showed new RML opacity  - UA clean, blood cxs from 4/16 +ve for E faecalis  - Vanc and Zosyn started on admit  - Appreciate ID's help. 3 day course of Remdesivir started  - TTE to R/O endocarditis, ICD lead infections.    - continue tylenol for fevers    Hypertension  Continue hydralazine    Headache  C/o of HA on admit with no improvement despite tylenol  - CTH done 4/16 with no acute processes    Stage 4 chronic kidney disease  -sCr has bumped from 4.3 to 5.0 (baseline 3's - 4's). Vanc level 23.1 so holding today's dose    LVAD (left ventricular assist device) present  Procedure: Device Interrogation Including analysis of device parameters  Current Settings: Ventricular Assist Device  Review of device function is stable/unstable stable    Lengthy post op course complicated by vasoplegia(requiring Giaprezza), debility, malnutrition/impaired swallowing, and renal failure requiring HD(since weaned off). Once medically stable, he was transferred to Rehab for further PT/OT/ST.        4/18/2024    12:42 PM 4/18/2024     8:23 AM 4/18/2024     5:02 AM 4/17/2024    11:13 PM 4/17/2024     8:40 PM 4/17/2024     4:00 PM 4/17/2024    12:03 PM   TXP LVAD INTERROGATIONS   Type HeartMate3 HeartMate3 HeartMate3 HeartMate3 HeartMate3 HeartMate3 HeartMate3   Flow 3.9 4.3 4 4.7 4.1 4.1 4.3   Speed 5000 5000 5050 5050 5000 5000 5000   PI 5.8 3.3 4.5 2.2 4.4 4.3 4.6   Power ([place]) 3.5 3.5 3.4 3.4 3.4 3.4 3.5   LSL  4600   4600 4600 4600   Pulsatility Intermittent pulse Intermittent pulse Intermittent pulse Intermittent pulse Intermittent pulse Pulse Intermittent pulse     Implant Date:12/1/23     Heartmate 3 RPM 5000     INR goal: 2-3   Bridge with Heparin   Antiplatelets: None d/t low H/H on implant     Type 2 diabetes mellitus without complication, without long-term current use of insulin  -SSI    HFrEF (heart failure with reduced ejection fraction)   He is on a very high dose of Bumex 4 mg twice daily.    Appears euvolemic on exam.  Will continue home doses.  Strict I/Os    Ventricular tachycardia  H/o VT/VF arrest. [place] [id number]  Heart Transplant  [facility] - Cardiology Step[facility]

## 2024-04-18 NOTE — PROGRESS NOTES
[facility] - Cardiology Stepdown  Endocrinology  Progress Note    Admit Date: 4/16/2024     Reason for Consult: Management of T2DM, Hyperglycemia      Surgical Procedure and Date: n/a     Diabetes diagnosis year: 1998     Home Diabetes Medications:  Patient has not been taking any medications for diabetes since October 2023    How often checking glucose at home? Checking infrequently   BG readings on regimen: 120-150's  Hypoglycemia on the regimen?  No  Missed doses on regimen?  n/a     Diabetes Complications include:     Hyperglycemia     Complicating diabetes co morbidities:   CAD s/p CABG, HTN, HLD        HPI: Mr. Radha Abbott is a very pleasant 63 yo male with stage D HFrEF, ICMP with previous admission for ADHF/CS. He did undergo HM3 placement placed by Dr Washington on 12/1/23. Lengthy post op course complicated by vasoplegia(requiring Giaprezza), debility, malnutrition/impaired swallowing, and renal failure requiring HD(since weaned off). Once medically stable, he was transferred to Rehab for further PT/OT/ST. Of note, pt left with permacath still in place. He has done really well in rehab and now has continued to progress well at home. He is on a very high dose of Bumex 4 mg twice daily. VAD speed is at 5000 rpm. No VAD alarms noted on interrogation occasional PI events . Today he presents to the ED for infectious workup due to complaints of fevers of 101.5F associated with fatigue, generalized weakness, lower back pain and HA. He also reports a decline in appetite. Wife changes his driveline dressing every Saturday and has not noticed any purulance with dressing changes. Body aches/weakness started around 2am last night followed by fevers around 5pm. He has been taking tylenol which has helped brng down fevers but has not helped with HA. HA is 5/10 in intensity. He reports slight increase in body weight/ fluid retention yesterday for which he took a dose of Metolazone with a drop in weight by ~5lbs. Denies any  "SOB, LE swelling at this time. Endocrine consulted to manage hyperglycemia and type 2 diabetes.     Hemoglobin A1C   Date Value Ref Range Status   2024 7.4 (H) 4.0 - 5.6 % Final     Comment:     ADA Screening Guidelines:  5.7-6.4%  Consistent with prediabetes  >or=6.5%  Consistent with diabetes    High levels of fetal hemoglobin interfere with the HbA1C  assay. Heterozygous hemoglobin variants (HbS, HgC, etc)do  not significantly interfere with this assay.   However, presence of multiple variants may affect accuracy.     2024 5.1 4.0 - 5.6 % Final     Comment:     ADA Screening Guidelines:  5.7-6.4%  Consistent with prediabetes  >or=6.5%  Consistent with diabetes    High levels of fetal hemoglobin interfere with the HbA1C  assay. Heterozygous hemoglobin variants (HbS, HgC, etc)do  not significantly interfere with this assay.   However, presence of multiple variants may affect accuracy.     10/12/2023 7.6 (H) 4.0 - 5.6 % Final     Comment:     ADA Screening Guidelines:  5.7-6.4%  Consistent with prediabetes  >or=6.5%  Consistent with diabetes    High levels of fetal hemoglobin interfere with the HbA1C  assay. Heterozygous hemoglobin variants (HbS, HgC, etc)do  not significantly interfere with this assay.   However, presence of multiple variants may affect accuracy.           Interval HPI:   Overnight events: No acute events overnight. Patient in room 311/311 A. Blood glucose stable. BG at goal on current insulin regimen (SSI ). Steroid use- None.    Renal function- Abnormal - Creatinine 4.3   Vasopressors-  None       Endocrine will continue to follow and manage insulin orders inpatient.         Diet Renal Coumadin Restriction; Fluid - 1500mL; Isolation Tray - Regular [place]     Eatin%  Nausea: No  Hypoglycemia and intervention: No  Fever: No  TPN and/or TF: No    BP 93/66   Pulse 97   Temp 100.2 °F (37.9 °C) (Oral)   Resp 18   Ht 6' 2" (1.88 m)   Wt 76.7 kg (169 lb 1.5 oz)   SpO2 (!) 93%   " "BMI 21.71 kg/m²     Labs Reviewed and Include    No results for input(s): "GLU", "CALCIUM", "ALBUMIN", "PROT", "NA", "K", "CO2", "CL", "BUN", "CREATININE", "ALKPHOS", "ALT", "AST", "BILITOT" in the last 24 hours.  Lab Results   Component Value Date    WBC 6.92 04/17/2024    HGB 9.3 (L) 04/17/2024    HCT 29.6 (L) 04/17/2024    MCV 89 04/17/2024     04/17/2024     No results for input(s): "TSH", "FREET4" in the last 168 hours.  Lab Results   Component Value Date    HGBA1C 7.4 (H) 04/17/2024       Nutritional status:   Body mass index is 21.71 kg/m².  Lab Results   Component Value Date    ALBUMIN 3.2 (L) 04/17/2024    ALBUMIN 3.0 (L) 04/17/2024    ALBUMIN 3.4 (L) 04/11/2024     Lab Results   Component Value Date    PREALBUMIN 20 04/17/2024    PREALBUMIN 34 04/11/2024    PREALBUMIN 29 03/25/2024       Estimated Creatinine Clearance: 19.3 mL/min (A) (based on SCr of 4.3 mg/dL (H)).    Accu-Checks  Recent Labs     04/17/24  0854 04/17/24  1210 04/17/24  1644 04/17/24  2042   POCTGLUCOSE 145* 190* 102 154*       Current Medications and/or Treatments Impacting Glycemic Control  Immunotherapy:    Immunosuppressants       None          Steroids:   Hormones (From admission, onward)      None          Pressors:    Autonomic Drugs (From admission, onward)      None          Hyperglycemia/Diabetes Medications:   Antihyperglycemics (From admission, onward)      Start     Stop Route Frequency Ordered    04/17/24 0738  insulin aspart U-100 pen 0-5 Units         -- SubQ Before meals & nightly PRN 04/17/24 0638            ASSESSMENT and PLAN    Pulmonary  * Pneumonia due to COVID-19 virus  Infection may elevate BG readings  On IV antibiotics  Managed per primary team  Avoid hypoglycemia        Cardiac/Vascular  LVAD (left ventricular assist device) present  Managed per primary team  Avoid hypoglycemia        Hypertension  Uncontrolled HTN can worsen insulin resistance.     Endocrine  Type 2 diabetes mellitus without " complication, without long-term current use of insulin  Endocrinology consulted for BG management.   BG goal 140-180    - Novolog (Insulin Aspart) prn for BG excursions LDC SSI (150/50)  - BG checks AC/HS  - Hypoglycemia protocol in place    ** Please notify Endocrine for any change and/or advance in diet**  ** Please call Endocrine for any BG related issues **    Discharge Planning:   TBD. Please notify endocrinology prior to discharge.             Erasmo Parrish, DNP, FNP  Endocrinology  [facility] - Cardiology Step[facility]

## 2024-04-18 NOTE — CONSULTS
[facility] - Cardiology Stepdown  Infectious Disease  Consult Note    Patient Name: Radha Abbott  MRN: [id number]  Admission Date: 4/16/2024  Hospital Length of Stay: 1 days  Attending Physician: Brayan Ocampo MD  Primary Care Provider: Vasu Kong MD     Isolation Status: Airborne and Contact and Droplet    Patient information was obtained from patient, spouse/SO, past medical records, and ER records.      Inpatient consult to Infectious Diseases  Consult performed by: Axel Caban MD  Consult ordered by: Fany Abbasi NP        Assessment/Plan:     Pulmonary  * Pneumonia due to COVID-19 virus  Patient reports receipt of COVID primary vaccine series and 1 booster.  Patient is on room air.  Small nodular opacification to right lung may represent bacterial superinfection.    Plan  IV remdesivir for 3 days  May continue zosyn for now but will likely narrow his therapy tomorrow.    ID  Bacteremia  64 year old male with a history of LVAD placement in December 2023 and AICD in place..  Patient is admitted with E faecalis bacteremia.  Source is unclear.  Per patient's wife, his drive line exit site looked clean.   E faecalis is a known cause of endocarditis and AICD lead infections.    Plan  Continue IV vancomycin.  Check echocardiogram.  Follow up on sensitivities of the E faecalis.  Repeat blood cultures.        Thank you for your consult. I will follow-up with patient. Please contact us if you have any additional questions.    Axel Caban MD  Infectious Disease  [facility] - Cardiology Stepdown    Subjective:     Principal Problem: Pneumonia due to COVID-19 virus    HPI: 64 year old male with a history of HFrEF s/p LVAD  placement in December of 2023.  Patient is admitted to the hospital with a 4 day history of fatigue and fevers.  He denies any other symptoms.  He reports some occasional cough.  He has had some lower back pain.  COVID testing was positive.  Blood cultures are now also positive for  GPC.  Per wife, she hasn't had any with drainage from his drive line exit site.    Past Medical History:   Diagnosis Date    CAD (coronary artery disease)     CHF (congestive heart failure)     Diabetes mellitus     HFrEF (heart failure with reduced ejection fraction)     ICD (implantable cardioverter-defibrillator) in place     MI, old        Past Surgical History:   Procedure Laterality Date    ANGIOPLASTY-VENOUS ARTERY Right 12/1/2023    Procedure: ANGIOPLASTY-VENOUS ARTERY, RIGHT FEMORAL;  Surgeon: Yuri Washington MD;  [facility];  Service: Cardiovascular;  Laterality: Right;    AORTIC VALVULOPLASTY N/A 12/1/2023    Procedure: REPAIR, AORTIC VALVE;  Surgeon: Yuri Washington MD;  [facility];  Service: Cardiovascular;  Laterality: N/A;    CARDIAC SURGERY      CLOSURE N/A 12/1/2023    Procedure: CLOSURE, TEMPORARY;  Surgeon: Yuri Washington MD;  [facility];  Service: Cardiovascular;  Laterality: N/A;    DRAINAGE OF PLEURAL EFFUSION  12/4/2023    Procedure: DRAINAGE, PLEURAL EFFUSION;  Surgeon: Yuri Washington MD;  [facility];  Service: Cardiovascular;;    INSERTION OF GRAFT TO PERICARDIUM  12/4/2023    Procedure: INSERTION, GRAFT, PERICARDIUM;  Surgeon: Yuri Washington MD;  [facility];  Service: Cardiovascular;;    INSERTION OF INTRA-AORTIC BALLOON ASSIST DEVICE Right 11/21/2023    Procedure: INSERTION, INTRA-AORTIC BALLOON PUMP;  Surgeon: Finn Cohn MD;  Location: [facility] CATH LAB;  Service: Cardiology;  Laterality: Right;    LEFT VENTRICULAR ASSIST DEVICE Left 12/1/2023    Procedure: INSERTION-LEFT VENTRICULAR ASSIST DEVICE;  Surgeon: Yuri Washington MD;  Location: [facility] OR [address] [place] [address];  Service: Cardiovascular;  Laterality: Left;  REDO STERNOTOMY - REDO SAW NEEDED FOR CASE    LYSIS OF ADHESIONS  12/1/2023    Procedure: LYSIS, ADHESIONS;  Surgeon: Yuri Washington MD;  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 12/1/2023    Procedure: REPAIR, ANEURYSM, ARTERY, FEMORAL;  Surgeon: Yuri Washington MD;  [facility];  Service: Cardiovascular;  Laterality: Right;  Right Femoral Artery Repair    RIGHT HEART CATHETERIZATION Right 10/10/2023    Procedure: INSERTION, CATHETER, RIGHT HEART;  Surgeon: Bin Gandhi MD;  Location: [facility] CATH LAB;  Service: Cardiology;  Laterality: Right;    RIGHT HEART CATHETERIZATION Right 10/13/2023    Procedure: INSERTION, CATHETER, RIGHT HEART;  Surgeon: Walter Mcintyre MD;  Location: [facility] CATH LAB;  Service: Cardiology;  Laterality: Right;    RIGHT HEART CATHETERIZATION  11/13/2023    RIGHT HEART CATHETERIZATION Right 11/13/2023    Procedure: INSERTION, CATHETER, RIGHT HEART;  Surgeon: Juventino Bermudez Jr., MD;  Location: [facility] CATH LAB;  Service: Cardiology;  Laterality: Right;    RIGHT HEART CATHETERIZATION Right 11/20/2023    Procedure: INSERTION, CATHETER, RIGHT HEART;  Surgeon: Sajan Hurley MD;  Location: [facility] CATH LAB;  Service: Cardiology;  Laterality: Right;    RIGHT HEART CATHETERIZATION Right 1/22/2024    Procedure: INSERTION, CATHETER, RIGHT HEART;  Surgeon: Brayan Ocampo MD;  Location: [facility] CATH LAB;  Service: Cardiology;  Laterality: Right;    STERNAL WOUND CLOSURE N/A 12/4/2023    Procedure: CLOSURE, WOUND, STERNUM;  Surgeon: Yuri Washington MD;  [facility];  Service: Cardiovascular;  Laterality: N/A;    STERNOTOMY N/A 12/1/2023    Procedure: STERNOTOMY,  REDO;  Surgeon: Yuri Washington MD;  Location: [facility] OR [address] [place] [address];  Service: Cardiovascular;  Laterality: N/A;    VALVULOPLASTY, MITRAL VALVE N/A 12/1/2023    Procedure: VALVULOPLASTY, MITRAL VALVE;  Surgeon: Yuri Washington MD;  [facility];  Service: Cardiovascular;  Laterality: N/A;       Review of patient's allergies indicates:  No Known Allergies    Medications:  Current Facility-Administered Medications   Medication Dose Route Frequency Provider Last Rate Last Admin    acetaminophen tablet 1,000 mg  1,000 mg Oral Q8H PRN Lucy Brand MD   1,000 mg at 04/17/24 2058    amiodarone tablet 400 mg  400 mg Oral Daily Lucy Brand MD   400 mg at 04/17/24 0845    bumetanide tablet 4 mg  4 mg Oral BID Lucy Brand MD   4 mg at 04/17/24 2047    dextrose 10% bolus 125 mL 125 mL  12.5 g Intravenous PRN Erasmo Parrish DNP, FNP        dextrose 10% bolus 250 mL 250 mL  25 g Intravenous PRN Erasmo Parrish DNP, FNP        famotidine tablet 20 mg  20 mg Oral Daily Lucy Brand MD   20 mg at 04/17/24 0845    glucagon (human recombinant) injection 1 mg  1 mg Intramuscular PRN Erasmo Parrish DNP, FNP        glucose chewable tablet 16 g  16 g Oral PRN Erasmo Parrish DNP, FNP        glucose chewable tablet 24 g  24 g Oral PRN Erasmo Parrish DNP, FNP        hydrALAZINE tablet 75 mg  75 mg Oral Q8H Lucy Brand MD   75 mg at 04/17/24 2048    insulin aspart U-100 pen 0-5 Units  0-5 Units Subcutaneous QID (AC + HS) PRN Erasmo Parrish DNP, FNP        magnesium oxide tablet 400 mg  400 mg Oral Daily Lucy Brand MD   400 mg at 04/17/24 0845    ondansetron injection 4 mg  4 mg Intravenous Once Ric Lara MD        piperacillin-tazobactam (ZOSYN) 4.5 g in dextrose 5 % in water (D5W) 100 mL IVPB (MB+)  4.5 g Intravenous Q12H Fany Abbasi NP 25 mL/hr at 04/17/24 2054 4.5 g at 04/17/24 2054    senna-docusate 8.6-50 mg per tablet 2 tablet  2  "tablet Oral Daily PRN Lucy Brand MD        sodium bicarbonate tablet 325 mg  325 mg Oral TID Lucy Brand MD   325 mg at 04/17/24 2047    trazodone split tablet 25 mg  25 mg Oral QHS Lucy Brand MD   25 mg at 04/17/24 2046    vancomycin - pharmacy to dose   Intravenous pharmacy to manage frequency Fany Abbasi, NP        venlafaxine tablet 37.5 mg  37.5 mg Oral Daily Lucy Brand MD   37.5 mg at 04/17/24 0846    warfarin (COUMADIN) tablet 2.5 mg  2.5 mg Oral Once per day on Sunday Monday Wednesday Friday Saturday Lucy Brand MD   2.5 mg at 04/17/24 1723    And    [START ON 4/18/2024] warfarin split tablet 3.75 mg  3.75 mg Oral Every Tues, Thurs Lucy Brand MD         Antibiotics (From admission, onward)      Start     Stop Route Frequency Ordered    04/17/24 0730  piperacillin-tazobactam (ZOSYN) 4.5 g in dextrose 5 % in water (D5W) 100 mL IVPB (MB+)         -- IV Every 12 hours (non-standard times) 04/17/24 0623    04/17/24 0718  vancomycin - pharmacy to dose  (vancomycin IVPB (PEDS and ADULTS))        Placed in "And" Linked Group    -- IV pharmacy to manage frequency 04/17/24 0623          Antifungals (From admission, onward)      None          Antivirals (From admission, onward)      None             Immunization History   Administered Date(s) Administered    COVID-19 MRNA, LN-S PF (MODERNA HALF 0.25 ML DOSE) 12/13/2021    COVID-19, MRNA, LN-S, PF (MODERNA FULL 0.5 ML DOSE) 03/18/2021, 04/15/2021    DTaP 06/27/2012    Hepatitis A, Adult 11/17/2023    Hepatitis B (recombinant) Adjuvanted, 2 dose 09/03/2023, 11/17/2023    Influenza - Quadrivalent 11/09/2021    PPD Test 12/22/2023    Pneumococcal Conjugate - 20 Valent 09/03/2023    RSVpreF (Arexvy) 09/03/2023    Tdap 06/27/2012, 09/03/2023    Zoster Recombinant 09/03/2023       Family History    None       Social History     Socioeconomic History    Marital status:    Tobacco Use    Smoking status: " Former     Current packs/day: 0.50     Types: Cigarettes   Substance and Sexual Activity    Alcohol use: Yes     Comment: rarely    Drug use: No    Sexual activity: Not Currently     Partners: Female     Social Determinants of Health     Financial Resource Strain: High Risk (3/26/2024)    Overall Financial Resource Strain (CARDIA)     Difficulty of Paying Living Expenses: Hard   Food Insecurity: Food Insecurity Present (3/26/2024)    Hunger Vital Sign     Worried About Running Out of Food in the Last Year: Sometimes true     Ran Out of Food in the Last Year: Often true   Transportation Needs: No Transportation Needs (3/26/2024)    PRAPARE - Transportation     Lack of Transportation (Medical): No     Lack of Transportation (Non-Medical): No   Physical Activity: Inactive (2/29/2024)    Exercise Vital Sign     Days of Exercise per Week: 2 days     Minutes of Exercise per Session: 0 min   Stress: Stress Concern Present (3/26/2024)    [place] [employer] - Occupational Stress Questionnaire     Feeling of Stress : To some extent   Social Connections: Moderately Integrated (2/29/2024)    Social Connection and Isolation Panel [NHANES]     Frequency of Communication with Friends and Family: More than three times a week     Frequency of Social Gatherings with Friends and Family: Once a week     Attends Taoism Services: More than 4 times per year     Active Member of Clubs or Organizations: No     Attends Club or Organization Meetings: Never     Marital Status:    Housing Stability: High Risk (3/26/2024)    Housing Stability Vital Sign     Unable to Pay for Housing in the Last Year: Yes     Number of Places Lived in the Last Year: 1     Unstable Housing in the Last Year: No     Review of Systems   Constitutional:  Positive for fatigue and fever.   Musculoskeletal:  Positive for back pain.   All other systems reviewed and are negative.    Objective:     Vital Signs (Most Recent):  Temp: (!) 100.9  °F (38.3 °C) (04/17/24 2100)  Pulse: 86 (04/17/24 2251)  Resp: 18 (04/17/24 2033)  BP: (!) 90/0 (04/17/24 2033)  SpO2: (!) 93 % (04/17/24 2033) Vital Signs (24h Range):  Temp:  [98.3 °F (36.8 °C)-100.9 °F (38.3 °C)] 100.9 °F (38.3 °C)  Pulse:  [] 86  Resp:  [15-26] 18  SpO2:  [93 %-100 %] 93 %  BP: ()/(0-75) 90/0     Weight: 76.7 kg (169 lb 1.5 oz)  Body mass index is 21.71 kg/m².    Estimated Creatinine Clearance: 19.3 mL/min (A) (based on SCr of 4.3 mg/dL (H)).     Physical Exam  Vitals and nursing note reviewed.   Constitutional:       General: He is not in acute distress.     Appearance: He is well-developed. He is not diaphoretic.   HENT:      Head: Normocephalic and atraumatic.      Right Ear: External ear normal.      Left Ear: External ear normal.      Nose: Nose normal.      Mouth/Throat:      Pharynx: No oropharyngeal exudate.   Eyes:      General: No scleral icterus.        Right eye: No discharge.         Left eye: No discharge.      Conjunctiva/sclera: Conjunctivae normal.      Pupils: Pupils are equal, round, and reactive to light.   Neck:      Thyroid: No thyromegaly.      Vascular: No JVD.      Trachea: No tracheal deviation.   Cardiovascular:      Comments: LVAD hum  Pulmonary:      Effort: Pulmonary effort is normal. No respiratory distress.      Breath sounds: Normal breath sounds. No stridor. No wheezing or rales.   Chest:      Chest wall: No tenderness.   Abdominal:      General: Bowel sounds are normal. There is no distension.      Palpations: Abdomen is soft. There is no mass.      Tenderness: There is no abdominal tenderness. There is no guarding or rebound.      Comments: LVAD drive line present.   Musculoskeletal:         General: No tenderness. Normal range of motion.      Cervical back: Normal range of motion and neck supple.   Lymphadenopathy:      Cervical: No cervical adenopathy.   Skin:     General: Skin is warm.      Coloration: Skin is not pale.      Findings: No  erythema or rash.   Neurological:      Mental Status: He is alert and oriented to person, place, and time.      Cranial Nerves: No cranial nerve deficit.      Motor: No abnormal muscle tone.      Coordination: Coordination normal.      Deep Tendon Reflexes: Reflexes normal.        Significant Labs:   Microbiology Results (last 7 days)       Procedure Component Value Units Date/Time    Rapid Organism ID by PCR (from Blood culture) [[id number]]  (Abnormal) Collected: 04/16/24 2322    Order Status: Completed Updated: 04/17/24 1726     Enterococcus faecalis Detected     Enterococcus faecium Not Detected     Listeria monocytogenes Not Detected     Staphylococcus spp. Not Detected     Staphylococcus aureus Not Detected     Staphylococcus epidermidis Not Detected     Staphylococcus lugdunensis Not Detected     Streptococcus species Not Detected     Streptococcus agalactiae Not Detected     Streptococcus pneumoniae Not Detected     Streptococcus pyogenes Not Detected     Acinetobacter calcoaceticus/baumannii complex Not Detected     Bacteroides fragilis Not Detected     Enterobacterales Not Detected     Enterobacter cloacae complex Not Detected     Escherichia coli Not Detected     Klebsiella aerogenes Not Detected     Klebsiella oxytoca Not Detected     Klebsiella pneumoniae group Not Detected     Proteus Not Detected     Salmonella sp Not Detected     Serratia marcescens Not Detected     Haemophilus influenzae Not Detected     Neisseria meningtidis Not Detected     Pseudomonas aeruginosa Not Detected     Stenotrophomonas maltophilia Not Detected     Candida albicans Not Detected     Candida auris Not Detected     Candida glabrata Not Detected     Candida krusei Not Detected     Candida parapsilosis Not Detected     Candida tropicalis Not Detected     Cryptococcus neoformans/gattii Not Detected     CTX-M (ESBL ) Test Not Applicable     IMP (Carbapenem resistant) Test Not Applicable     KPC resistance gene  (Carbapenem resistant) Test Not Applicable     mcr-1  Test Not Applicable     mec A/C  Test Not Applicable     mec A/C and MREJ (MRSA) gene Test Not Applicable     NDM (Carbapenem resistant) Test Not Applicable     OXA-48-like (Carbapenem resistant) Test Not Applicable     van A/B (VRE gene) Not Detected     VIM (Carbapenem resistant) Test Not Applicable    Narrative:      Aerobic and anaerobic    Blood culture x two cultures. Draw prior to antibiotics. [[id number]] Collected: 04/16/24 2322    Order Status: Completed Specimen: Blood from Peripheral, Hand, Right Updated: 04/17/24 1509     Blood Culture, Routine Gram stain aer bottle: Gram positive cocci      Gram stain nata bottle: Gram positive cocci      Positive results previously called 04/17/2024 15:08    Narrative:      Aerobic and anaerobic    Blood culture x two cultures. Draw prior to antibiotics. [[id number]] Collected: 04/16/24 2322    Order Status: Completed Specimen: Blood from Peripheral, Hand, Left Updated: 04/17/24 1509     Blood Culture, Routine Gram stain aer bottle: Gram positive cocci      Gram stain nata bottle: Gram positive cocci      Results called to and read back by: Rachael Horan RN 04/17/2024  15:08    Narrative:      Aerobic and anaerobic    Influenza A & B by Molecular [[id number]] Collected: 04/16/24 2347    Order Status: Completed Specimen: Nasopharyngeal Swab Updated: 04/17/24 0059     Influenza A, Molecular Negative     Influenza B, Molecular Negative     Flu A & B Source Nasal swab            Significant Imaging: I have reviewed all pertinent imaging results/findings within the past 24 hours.

## 2024-04-18 NOTE — SUBJECTIVE & OBJECTIVE
Past Medical History:   Diagnosis Date    CAD (coronary artery disease)     CHF (congestive heart failure)     Diabetes mellitus     HFrEF (heart failure with reduced ejection fraction)     ICD (implantable cardioverter-defibrillator) in place     MI, old        Past Surgical History:   Procedure Laterality Date    ANGIOPLASTY-VENOUS ARTERY Right 12/1/2023    Procedure: ANGIOPLASTY-VENOUS ARTERY, RIGHT FEMORAL;  Surgeon: Yuri Washington MD;  [facility];  Service: Cardiovascular;  Laterality: Right;    AORTIC VALVULOPLASTY N/A 12/1/2023    Procedure: REPAIR, AORTIC VALVE;  Surgeon: Yuri Washington MD;  [facility];  Service: Cardiovascular;  Laterality: N/A;    CARDIAC SURGERY      CLOSURE N/A 12/1/2023    Procedure: CLOSURE, TEMPORARY;  Surgeon: Yuri Washington MD;  [facility];  Service: Cardiovascular;  Laterality: N/A;    DRAINAGE OF PLEURAL EFFUSION  12/4/2023    Procedure: DRAINAGE, PLEURAL EFFUSION;  Surgeon: Yuri Washington MD;  [facility];  Service: Cardiovascular;;    INSERTION OF GRAFT TO PERICARDIUM  12/4/2023    Procedure: INSERTION, GRAFT, PERICARDIUM;  Surgeon: Yuri Washington MD;  Location: [facility] OR [address] [place] [address];  Service: Cardiovascular;;    INSERTION OF INTRA-AORTIC BALLOON ASSIST DEVICE Right 11/21/2023    Procedure: INSERTION, INTRA-AORTIC BALLOON PUMP;  Surgeon: Finn Cohn MD;  Location: [facility] CATH LAB;  Service: Cardiology;  Laterality: Right;    LEFT VENTRICULAR ASSIST DEVICE Left 12/1/2023    Procedure: INSERTION-LEFT VENTRICULAR ASSIST DEVICE;  Surgeon: Yuri Washington MD;  Location: [facility] OR [address] [place] [address];  Service: Cardiovascular;  Laterality: Left;  REDO STERNOTOMY - REDO SAW NEEDED FOR CASE    LYSIS OF ADHESIONS  12/1/2023    Procedure: LYSIS, ADHESIONS;  Surgeon: Yuri Washington MD;  Location: [facility] OR [address] [place] [address];  Service: Cardiovascular;;    PLACEMENT OF SWAN ROLANDO CATHETER WITH IMAGING GUIDANCE  11/20/2023    Procedure: INSERTION,  CATHETER, SWAN-ROLANDO, WITH IMAGING GUIDANCE;  Surgeon: Sajan Hurley MD;  Location: [facility] CATH LAB;  Service: Cardiology;;    REMOVAL OF TUNNELED CENTRAL VENOUS CATHETER (CVC) N/A 3/1/2024    Procedure: REMOVAL, CATHETER, CENTRAL VENOUS, TUNNELED;  Surgeon: Seble Aguilar MD;  Location: [facility] CATH LAB;  Service: Interventional Nephrology;  Laterality: N/A;    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 12/1/2023    Procedure: REPAIR, ANEURYSM, ARTERY, FEMORAL;  Surgeon: Yuri Washington MD;  [facility];  Service: Cardiovascular;  Laterality: Right;  Right Femoral Artery Repair    RIGHT HEART CATHETERIZATION Right 10/10/2023    Procedure: INSERTION, CATHETER, RIGHT HEART;  Surgeon: Bin Gandhi MD;  Location: [facility] CATH LAB;  Service: Cardiology;  Laterality: Right;    RIGHT HEART CATHETERIZATION Right 10/13/2023    Procedure: INSERTION, CATHETER, RIGHT HEART;  Surgeon: Walter Mcintyre MD;  Location: [facility] CATH LAB;  Service: Cardiology;  Laterality: Right;    RIGHT HEART CATHETERIZATION  11/13/2023    RIGHT HEART CATHETERIZATION Right 11/13/2023    Procedure: INSERTION, CATHETER, RIGHT HEART;  Surgeon: Juventino Bermudez Jr., MD;  Location: [facility] CATH LAB;  Service: Cardiology;  Laterality: Right;    RIGHT HEART CATHETERIZATION Right 11/20/2023    Procedure: INSERTION, CATHETER, RIGHT HEART;  Surgeon: Sajan Hurley MD;  Location: [facility] CATH LAB;  Service: Cardiology;  Laterality: Right;    RIGHT HEART CATHETERIZATION Right 1/22/2024    Procedure: INSERTION, CATHETER, RIGHT HEART;  Surgeon: Brayan Ocampo MD;  Location: [facility] CATH LAB;  Service: Cardiology;  Laterality: Right;    STERNAL WOUND CLOSURE N/A 12/4/2023    Procedure: CLOSURE, WOUND, STERNUM;  Surgeon: Yuri Washington MD;  [facility];  Service: Cardiovascular;  Laterality: N/A;    STERNOTOMY N/A 12/1/2023    Procedure: STERNOTOMY, REDO;  Surgeon: Yuri Washington MD;  [facility];  Service:  Cardiovascular;  Laterality: N/A;    VALVULOPLASTY, MITRAL VALVE N/A 12/1/2023    Procedure: VALVULOPLASTY, MITRAL VALVE;  Surgeon: Yuri Washington MD;  Location: [facility] OR [address] [place] [address];  Service: Cardiovascular;  Laterality: N/A;       Review of patient's allergies indicates:  No Known Allergies    Medications:  Current Facility-Administered Medications   Medication Dose Route Frequency Provider Last Rate Last Admin    acetaminophen tablet 1,000 mg  1,000 mg Oral Q8H PRN Lucy Brand MD   1,000 mg at 04/17/24 2058    amiodarone tablet 400 mg  400 mg Oral Daily Lucy Brand MD   400 mg at 04/17/24 0845    bumetanide tablet 4 mg  4 mg Oral BID Lucy Brand MD   4 mg at 04/17/24 2047    dextrose 10% bolus 125 mL 125 mL  12.5 g Intravenous PRN Erasmo Parrish DNP, FNP        dextrose 10% bolus 250 mL 250 mL  25 g Intravenous PRN Erasmo Parrish DNP, FNP        famotidine tablet 20 mg  20 mg Oral Daily Lucy Brand MD   20 mg at 04/17/24 0845    glucagon (human recombinant) injection 1 mg  1 mg Intramuscular PRN Erasmo Parrish DNP, FNP        glucose chewable tablet 16 g  16 g Oral PRN Erasmo Parrish DNP, FNP        glucose chewable tablet 24 g  24 g Oral PRN Erasmo Parrish DNP, FNP        hydrALAZINE tablet 75 mg  75 mg Oral Q8H Lucy Brand MD   75 mg at 04/17/24 2048    insulin aspart U-100 pen 0-5 Units  0-5 Units Subcutaneous QID (AC + HS) PRN Erasmo Parrish DNP, FNP        magnesium oxide tablet 400 mg  400 mg Oral Daily Lucy Brand MD   400 mg at 04/17/24 0845    ondansetron injection 4 mg  4 mg Intravenous Once Ric Lara MD        piperacillin-tazobactam (ZOSYN) 4.5 g in dextrose 5 % in water (D5W) 100 mL IVPB (MB+)  4.5 g Intravenous Q12H Fany Abbasi NP 25 mL/hr at 04/17/24 2054 4.5 g at 04/17/24 2054    senna-docusate 8.6-50 mg per tablet 2 tablet  2 tablet Oral Daily PRN Lucy Brand MD        sodium bicarbonate  "tablet 325 mg  325 mg Oral TID Lucy Brand MD   325 mg at 04/17/24 2047    trazodone split tablet 25 mg  25 mg Oral QHS Lucy Brand MD   25 mg at 04/17/24 2046    vancomycin - pharmacy to dose   Intravenous pharmacy to manage frequency Fany Abbasi, ALETHA        venlafaxine tablet 37.5 mg  37.5 mg Oral Daily Lucy Brand MD   37.5 mg at 04/17/24 0846    warfarin (COUMADIN) tablet 2.5 mg  2.5 mg Oral Once per day on Sunday Monday Wednesday Friday Saturday Lucy Brand MD   2.5 mg at 04/17/24 1723    And    [START ON 4/18/2024] warfarin split tablet 3.75 mg  3.75 mg Oral Every Tues, Thurs Lucy Brand MD         Antibiotics (From admission, onward)      Start     Stop Route Frequency Ordered    04/17/24 0730  piperacillin-tazobactam (ZOSYN) 4.5 g in dextrose 5 % in water (D5W) 100 mL IVPB (MB+)         -- IV Every 12 hours (non-standard times) 04/17/24 0623    04/17/24 0718  vancomycin - pharmacy to dose  (vancomycin IVPB (PEDS and ADULTS))        Placed in "And" Linked Group    -- IV pharmacy to manage frequency 04/17/24 0623          Antifungals (From admission, onward)      None          Antivirals (From admission, onward)      None             Immunization History   Administered Date(s) Administered    COVID-19 MRNA, LN-S PF (MODERNA HALF 0.25 ML DOSE) 12/13/2021    COVID-19, MRNA, LN-S, PF (MODERNA FULL 0.5 ML DOSE) 03/18/2021, 04/15/2021    DTaP 06/27/2012    Hepatitis A, Adult 11/17/2023    Hepatitis B (recombinant) Adjuvanted, 2 dose 09/03/2023, 11/17/2023    Influenza - Quadrivalent 11/09/2021    PPD Test 12/22/2023    Pneumococcal Conjugate - 20 Valent 09/03/2023    RSVpreF (Arexvy) 09/03/2023    Tdap 06/27/2012, 09/03/2023    Zoster Recombinant 09/03/2023       Family History    None       Social History     Socioeconomic History    Marital status:    Tobacco Use    Smoking status: Former     Current packs/day: 0.50     Types: Cigarettes   Substance and " Sexual Activity    Alcohol use: Yes     Comment: rarely    Drug use: No    Sexual activity: Not Currently     Partners: Female     Social Determinants of Health     Financial Resource Strain: High Risk (3/26/2024)    Overall Financial Resource Strain (CARDIA)     Difficulty of Paying Living Expenses: Hard   Food Insecurity: Food Insecurity Present (3/26/2024)    Hunger Vital Sign     Worried About Running Out of Food in the Last Year: Sometimes true     Ran Out of Food in the Last Year: Often true   Transportation Needs: No Transportation Needs (3/26/2024)    PRAPARE - Transportation     Lack of Transportation (Medical): No     Lack of Transportation (Non-Medical): No   Physical Activity: Inactive (2/29/2024)    Exercise Vital Sign     Days of Exercise per Week: 2 days     Minutes of Exercise per Session: 0 min   Stress: Stress Concern Present (3/26/2024)    [employer] [place] [employer] - Occupational Stress Questionnaire     Feeling of Stress : To some extent   Social Connections: Moderately Integrated (2/29/2024)    Social Connection and Isolation Panel [NHANES]     Frequency of Communication with Friends and Family: More than three times a week     Frequency of Social Gatherings with Friends and Family: Once a week     Attends [employer] Services: More than 4 times per year     Active Member of Clubs or Organizations: No     Attends Club or Organization Meetings: Never     Marital Status:    Housing Stability: High Risk (3/26/2024)    Housing Stability Vital Sign     Unable to Pay for Housing in the Last Year: Yes     Number of Places Lived in the Last Year: 1     Unstable Housing in the Last Year: No     Review of Systems   Constitutional:  Positive for fatigue and fever.   Musculoskeletal:  Positive for back pain.   All other systems reviewed and are negative.    Objective:     Vital Signs (Most Recent):  Temp: (!) 100.9 °F (38.3 °C) (04/17/24 2100)  Pulse: 86 (04/17/24 2251)  Resp: 18  (04/17/24 2033)  BP: (!) 90/0 (04/17/24 2033)  SpO2: (!) 93 % (04/17/24 2033) Vital Signs (24h Range):  Temp:  [98.3 °F (36.8 °C)-100.9 °F (38.3 °C)] 100.9 °F (38.3 °C)  Pulse:  [] 86  Resp:  [15-26] 18  SpO2:  [93 %-100 %] 93 %  BP: ()/(0-75) 90/0     Weight: 76.7 kg (169 lb 1.5 oz)  Body mass index is 21.71 kg/m².    Estimated Creatinine Clearance: 19.3 mL/min (A) (based on SCr of 4.3 mg/dL (H)).     Physical Exam  Vitals and nursing note reviewed.   Constitutional:       General: He is not in acute distress.     Appearance: He is well-developed. He is not diaphoretic.   HENT:      Head: Normocephalic and atraumatic.      Right Ear: External ear normal.      Left Ear: External ear normal.      Nose: Nose normal.      Mouth/Throat:      Pharynx: No oropharyngeal exudate.   Eyes:      General: No scleral icterus.        Right eye: No discharge.         Left eye: No discharge.      Conjunctiva/sclera: Conjunctivae normal.      Pupils: Pupils are equal, round, and reactive to light.   Neck:      Thyroid: No thyromegaly.      Vascular: No JVD.      Trachea: No tracheal deviation.   Cardiovascular:      Comments: LVAD hum  Pulmonary:      Effort: Pulmonary effort is normal. No respiratory distress.      Breath sounds: Normal breath sounds. No stridor. No wheezing or rales.   Chest:      Chest wall: No tenderness.   Abdominal:      General: Bowel sounds are normal. There is no distension.      Palpations: Abdomen is soft. There is no mass.      Tenderness: There is no abdominal tenderness. There is no guarding or rebound.      Comments: LVAD drive line present.   Musculoskeletal:         General: No tenderness. Normal range of motion.      Cervical back: Normal range of motion and neck supple.   Lymphadenopathy:      Cervical: No cervical adenopathy.   Skin:     General: Skin is warm.      Coloration: Skin is not pale.      Findings: No erythema or rash.   Neurological:      Mental Status: He is alert and  oriented to person, place, and time.      Cranial Nerves: No cranial nerve deficit.      Motor: No abnormal muscle tone.      Coordination: Coordination normal.      Deep Tendon Reflexes: Reflexes normal.        Significant Labs:   Microbiology Results (last 7 days)       Procedure Component Value Units Date/Time    Rapid Organism ID by PCR (from Blood culture) [[id number]]  (Abnormal) Collected: 04/16/24 2322    Order Status: Completed Updated: 04/17/24 1726     Enterococcus faecalis Detected     Enterococcus faecium Not Detected     Listeria monocytogenes Not Detected     Staphylococcus spp. Not Detected     Staphylococcus aureus Not Detected     Staphylococcus epidermidis Not Detected     Staphylococcus lugdunensis Not Detected     Streptococcus species Not Detected     Streptococcus agalactiae Not Detected     Streptococcus pneumoniae Not Detected     Streptococcus pyogenes Not Detected     Acinetobacter calcoaceticus/baumannii complex Not Detected     Bacteroides fragilis Not Detected     Enterobacterales Not Detected     Enterobacter cloacae complex Not Detected     Escherichia coli Not Detected     Klebsiella aerogenes Not Detected     Klebsiella oxytoca Not Detected     Klebsiella pneumoniae group Not Detected     Proteus Not Detected     Salmonella sp Not Detected     Serratia marcescens Not Detected     Haemophilus influenzae Not Detected     Neisseria meningtidis Not Detected     Pseudomonas aeruginosa Not Detected     Stenotrophomonas maltophilia Not Detected     Candida albicans Not Detected     Candida auris Not Detected     Candida glabrata Not Detected     Candida krusei Not Detected     Candida parapsilosis Not Detected     Candida tropicalis Not Detected     Cryptococcus neoformans/gattii Not Detected     CTX-M (ESBL ) Test Not Applicable     IMP (Carbapenem resistant) Test Not Applicable     KPC resistance gene (Carbapenem resistant) Test Not Applicable     mcr-1  Test Not Applicable      mec A/C  Test Not Applicable     mec A/C and MREJ (MRSA) gene Test Not Applicable     NDM (Carbapenem resistant) Test Not Applicable     OXA-48-like (Carbapenem resistant) Test Not Applicable     van A/B (VRE gene) Not Detected     VIM (Carbapenem resistant) Test Not Applicable    Narrative:      Aerobic and anaerobic    Blood culture x two cultures. Draw prior to antibiotics. [[id number]] Collected: 04/16/24 2322    Order Status: Completed Specimen: Blood from Peripheral, Hand, Right Updated: 04/17/24 1509     Blood Culture, Routine Gram stain aer bottle: Gram positive cocci      Gram stain nata bottle: Gram positive cocci      Positive results previously called 04/17/2024 15:08    Narrative:      Aerobic and anaerobic    Blood culture x two cultures. Draw prior to antibiotics. [[id number]] Collected: 04/16/24 2322    Order Status: Completed Specimen: Blood from Peripheral, Hand, Left Updated: 04/17/24 1509     Blood Culture, Routine Gram stain aer bottle: Gram positive cocci      Gram stain nata bottle: Gram positive cocci      Results called to and read back by: Rachael Horan RN 04/17/2024  15:08    Narrative:      Aerobic and anaerobic    Influenza A & B by Molecular [[id number]] Collected: 04/16/24 2347    Order Status: Completed Specimen: Nasopharyngeal Swab Updated: 04/17/24 0059     Influenza A, Molecular Negative     Influenza B, Molecular Negative     Flu A & B Source Nasal swab            Significant Imaging: I have reviewed all pertinent imaging results/findings within the past 24 hours.

## 2024-04-18 NOTE — HPI
64 year old male with a history of HFrEF s/p LVAD  placement in December of 2023.  Patient is admitted to the hospital with a 4 day history of fatigue and fevers.  He denies any other symptoms.  He reports some occasional cough.  He has had some lower back pain.  COVID testing was positive.  Blood cultures are now also positive for GPC.  Per wife, she hasn't had any with drainage from his drive line exit site.

## 2024-04-18 NOTE — PROGRESS NOTES
Pharmacokinetic Assessment Follow Up: IV Vancomycin    Vancomycin serum concentration assessment(s):    AM level = 23.1 (goal 15-20 for enterococcus bacteremia, although likely covered by zosyn). Hold today's dose and repeat AM level on 4/19. Patient has CKD 4.     Drug levels (last 3 results):  Recent Labs   Lab Result Units 04/18/24  0534   Vancomycin, Random ug/mL 23.1

## 2024-04-18 NOTE — PT/OT/SLP PROGRESS
Physical Therapy      Patient Name:  Radha Abbott   MRN:  [id number]    Patient not seen today secondary to 0955 attempt pt's wife declined session requesting pt sleep PT return later; this PT unable to make additional attempts . Patient still within POC for this week. Will follow-up at next scheduled visit.

## 2024-04-18 NOTE — PROGRESS NOTES
04/18/2024  Albanialee Duarte    Current provider:  Brayan Ocampo MD    Device interrogation:      4/18/2024     5:02 AM 4/17/2024    11:13 PM 4/17/2024     8:40 PM 4/17/2024     4:00 PM 4/17/2024    12:03 PM 4/17/2024     8:00 AM 4/17/2024     4:30 AM   TXP LVAD INTERROGATIONS   Type HeartMate3 HeartMate3 HeartMate3 HeartMate3 HeartMate3 HeartMate3 HeartMate3   Flow 4 4.7 4.1 4.1 4.3 4.1 3.8   Speed 5050 5050 5000 5000 5000 5000 5000   PI 4.5 2.2 4.4 4.3 4.6 4.5 6.3   Power ([place]) 3.4 3.4 3.4 3.4 3.5 3.4 3.4   LSL   4600 4600 4600 4600 4600   Pulsatility Intermittent pulse Intermittent pulse Intermittent pulse Pulse Intermittent pulse Pulse Intermittent pulse          Rounded on [facility] to ensure all mechanical assist device settings (IABP or VAD) were appropriate and all parameters were within limits.  I was able to ensure all back up equipment was present, the staff had no issues, and the Perfusion Department daily rounding was complete.      For implantable VADs: Interrogation of Ventricular assist device was performed with analysis of device parameters and review of device function. I have personally reviewed the interrogation findings and agree with findings as stated.     In emergency, the nursing units have been notified to contact the perfusion department either by:  Calling [phone] from 630am to 4pm Mon thru Fri, utilizing the On-Call Finder functionality of Epic and searching for Perfusion, or by contacting the hospital  from 4pm to 630am and on weekends and asking to speak with the perfusionist on call.    7:17 AM

## 2024-04-18 NOTE — SUBJECTIVE & OBJECTIVE
Interval History: Still feels weak and generally achy. N/V overnight. T max past 24 hours 100.9. Appreciate ID's help. Three day course of Remdesivir started today. Oxygenating well on RA. Blood cxs from 4/16 are +ve for E faecalis. Vanc and Zosyn continue but Vanc level 23.1 and sCr has bumped from 4.3 to 5.0 so holding today's dose. Given E faecalis bacteremia, will get TTE to R/O endocarditis, ICD lead infections.      Continuous Infusions:  Current Facility-Administered Medications   Medication Dose Route Frequency Last Rate Last Admin     Scheduled Meds:  Current Facility-Administered Medications   Medication Dose Route Frequency    amiodarone  400 mg Oral Daily    bumetanide  4 mg Oral BID    famotidine  20 mg Oral Daily    hydrALAZINE  75 mg Oral Q8H    magnesium oxide  400 mg Oral Daily    ondansetron  4 mg Intravenous Once    piperacillin-tazobactam (Zosyn) IV (PEDS and ADULTS) (extended infusion is not appropriate)  4.5 g Intravenous Q12H    [START ON 4/19/2024] remdesivir infusion  100 mg Intravenous Daily    sodium bicarbonate  325 mg Oral TID    traZODone  25 mg Oral QHS    venlafaxine  37.5 mg Oral Daily    [START ON 4/19/2024] warfarin  2.5 mg Oral Once per day on Sunday Monday Wednesday Friday Saturday    And    [START ON 4/19/2024] warfarin  3.75 mg Oral Every Tues, Thurs    warfarin  4 mg Oral Once     PRN Meds:  Current Facility-Administered Medications:     acetaminophen, 1,000 mg, Oral, Q8H PRN    dextrose 10%, 12.5 g, Intravenous, PRN    dextrose 10%, 25 g, Intravenous, PRN    glucagon (human recombinant), 1 mg, Intramuscular, PRN    glucose, 16 g, Oral, PRN    glucose, 24 g, Oral, PRN    insulin aspart U-100, 0-5 Units, Subcutaneous, QID (AC + HS) PRN    senna-docusate 8.6-50 mg, 2 tablet, Oral, Daily PRN    Pharmacy to dose Vancomycin consult, , , Once **AND** vancomycin - pharmacy to dose, , Intravenous, pharmacy to manage frequency    Review of patient's allergies indicates:  No Known  Allergies  Objective:     Vital Signs (Most Recent):  Temp: 98.2 °F (36.8 °C) (04/18/24 1155)  Pulse: 78 (04/18/24 1400)  Resp: 18 (04/18/24 1155)  BP: (!) 82/0 (04/18/24 1240)  SpO2: 95 % (04/18/24 1155) Vital Signs (24h Range):  Temp:  [98.2 °F (36.8 °C)-100.9 °F (38.3 °C)] 98.2 °F (36.8 °C)  Pulse:  [] 78  Resp:  [18-19] 18  SpO2:  [93 %-98 %] 95 %  BP: (62-93)/(0-70) 82/0     Patient Vitals for the past 72 hrs (Last 3 readings):   Weight   04/18/24 1240 75 kg (165 lb 5.5 oz)   04/18/24 0913 75.2 kg (165 lb 12.6 oz)   04/17/24 1200 76.7 kg (169 lb 1.5 oz)     Body mass index is 21.23 kg/m².      Intake/Output Summary (Last 24 hours) at 4/18/2024 1423  Last data filed at 4/18/2024 0516  Gross per 24 hour   Intake 600 ml   Output 800 ml   Net -200 ml       Hemodynamic Parameters:       Telemetry: SR with PVC's       Physical Exam  Constitutional:       Appearance: He is ill-appearing.   HENT:      Head: Normocephalic and atraumatic.   Eyes:      Conjunctiva/sclera: Conjunctivae normal.      Pupils: Pupils are equal, round, and reactive to light.   Neck:      Comments: Do not appreciate elevated JVP  Cardiovascular:      Rate and Rhythm: Normal rate and regular rhythm.      Comments: Smooth VAD hum  Pulmonary:      Effort: Pulmonary effort is normal.      Comments: Breath sounds are decreased right base. Lungs essentially CTA bilaterally  Abdominal:      General: Bowel sounds are normal.      Palpations: Abdomen is soft.   Musculoskeletal:         General: No swelling. Normal range of motion.      Cervical back: Normal range of motion and neck supple.   Skin:     General: Skin is warm and dry.      Capillary Refill: Capillary refill takes 2 to 3 seconds.   Neurological:      General: No focal deficit present.      Mental Status: He is alert and oriented to person, place, and time.            Significant Labs:  CBC:  Recent Labs   Lab 04/16/24  2323 04/17/24  0409 04/18/24  0534   WBC 7.93 6.92 11.26   RBC  3.13* 3.34* 3.44*   HGB 9.1* 9.3* 9.7*   HCT 27.1* 29.6* 30.6*    366 310   MCV 87 89 89   MCH 29.1 27.8 28.2   MCHC 33.6 31.4* 31.7*     BNP:  Recent Labs   Lab 04/17/24 0409   *     CMP:  Recent Labs   Lab 04/17/24  0030 04/17/24 0409 04/18/24  0534   * 117* 138*   CALCIUM 8.9 9.5 9.1   ALBUMIN 3.0* 3.2*  --    PROT 7.7 8.2  --    * 134* 135*   K 4.0 3.9 4.0   CO2 23 25 25   CL 95 94* 93*   BUN 76* 82* 86*   CREATININE 4.3* 4.3* 5.0*   ALKPHOS 127 143*  --    ALT 88* 101*  --    AST 76* 88*  --    BILITOT 0.4 0.5  --       Coagulation:   Recent Labs   Lab 04/16/24 2323 04/17/24 0409 04/18/24  0534   INR 2.2* 2.3* 1.9*   APTT  --  43.6* 46.1*     LDH:  Recent Labs   Lab 04/16/24 2323 04/17/24 0409 04/18/24  0534   * 461* 729*     Microbiology:  Microbiology Results (last 7 days)       Procedure Component Value Units Date/Time    Blood culture x two cultures. Draw prior to antibiotics. [[id number]]  (Abnormal) Collected: 04/16/24 2322    Order Status: Completed Specimen: Blood from Peripheral, Hand, Right Updated: 04/18/24 0903     Blood Culture, Routine Gram stain aer bottle: Gram positive cocci      Gram stain nata bottle: Gram positive cocci      Positive results previously called 04/17/2024 15:08      ENTEROCOCCUS SPECIES  Identification pending  For susceptibility see order #[id number]      Narrative:      Aerobic and anaerobic    Blood culture x two cultures. Draw prior to antibiotics. [[id number]]  (Abnormal) Collected: 04/16/24 2322    Order Status: Completed Specimen: Blood from Peripheral, Hand, Left Updated: 04/18/24 0901     Blood Culture, Routine Gram stain aer bottle: Gram positive cocci      Gram stain nata bottle: Gram positive cocci      Results called to and read back by: Rachael Horan RN 04/17/2024  15:08      ENTEROCOCCUS SPECIES  Identification and susceptibility pending      Narrative:      Aerobic and anaerobic    Blood culture [[id number]] Collected:  04/18/24 0535    Order Status: Sent Specimen: Blood Updated: 04/18/24 0544    Blood culture [[id number]] Collected: 04/18/24 0534    Order Status: Sent Specimen: Blood Updated: 04/18/24 0544    Rapid Organism ID by PCR (from Blood culture) [[id number]]  (Abnormal) Collected: 04/16/24 2322    Order Status: Completed Updated: 04/17/24 1726     Enterococcus faecalis Detected     Enterococcus faecium Not Detected     Listeria monocytogenes Not Detected     Staphylococcus spp. Not Detected     Staphylococcus aureus Not Detected     Staphylococcus epidermidis Not Detected     Staphylococcus lugdunensis Not Detected     Streptococcus species Not Detected     Streptococcus agalactiae Not Detected     Streptococcus pneumoniae Not Detected     Streptococcus pyogenes Not Detected     Acinetobacter calcoaceticus/baumannii complex Not Detected     Bacteroides fragilis Not Detected     Enterobacterales Not Detected     Enterobacter cloacae complex Not Detected     Escherichia coli Not Detected     Klebsiella aerogenes Not Detected     Klebsiella oxytoca Not Detected     Klebsiella pneumoniae group Not Detected     Proteus Not Detected     Salmonella sp Not Detected     Serratia marcescens Not Detected     Haemophilus influenzae Not Detected     Neisseria meningtidis Not Detected     Pseudomonas aeruginosa Not Detected     Stenotrophomonas maltophilia Not Detected     Candida albicans Not Detected     Candida auris Not Detected     Candida glabrata Not Detected     Candida krusei Not Detected     Candida parapsilosis Not Detected     Candida tropicalis Not Detected     Cryptococcus neoformans/gattii Not Detected     CTX-M (ESBL ) Test Not Applicable     IMP (Carbapenem resistant) Test Not Applicable     KPC resistance gene (Carbapenem resistant) Test Not Applicable     mcr-1  Test Not Applicable     mec A/C  Test Not Applicable     mec A/C and MREJ (MRSA) gene Test Not Applicable     NDM (Carbapenem resistant) Test Not  Applicable     OXA-48-like (Carbapenem resistant) Test Not Applicable     van A/B (VRE gene) Not Detected     VIM (Carbapenem resistant) Test Not Applicable    Narrative:      Aerobic and anaerobic    Influenza A & B by Molecular [[id number]] Collected: 04/16/24 2057    Order Status: Completed Specimen: Nasopharyngeal Swab Updated: 04/17/24 0059     Influenza A, Molecular Negative     Influenza B, Molecular Negative     Flu A & B Source Nasal swab            I have reviewed all pertinent labs within the past 24 hours.    Estimated Creatinine Clearance: 16.3 mL/min (A) (based on SCr of 5 mg/dL (H)).    Diagnostic Results:  I have reviewed and interpreted all pertinent imaging results/findings within the past 24 hours.   Good neck ROM

## 2024-04-18 NOTE — PLAN OF CARE
Problem: Adult Inpatient Plan of Care  Goal: Plan of Care Review  Outcome: Ongoing, Progressing  Goal: Patient-Specific Goal (Individualized)  Outcome: Ongoing, Progressing  Goal: Absence of Hospital-Acquired Illness or Injury  Outcome: Ongoing, Progressing  Goal: Optimal Comfort and Wellbeing  Outcome: Ongoing, Progressing  Goal: Readiness for Transition of Care  Outcome: Ongoing, Progressing     Problem: Diabetes Comorbidity  Goal: Blood Glucose Level Within Targeted Range  Outcome: Ongoing, Progressing     Problem: Adjustment to Illness (Sepsis/Septic Shock)  Goal: Optimal Coping  Outcome: Ongoing, Progressing     Problem: Bleeding (Sepsis/Septic Shock)  Goal: Absence of Bleeding  Outcome: Ongoing, Progressing     Problem: Glycemic Control Impaired (Sepsis/Septic Shock)  Goal: Blood Glucose Level Within Desired Range  Outcome: Ongoing, Progressing     Problem: Infection Progression (Sepsis/Septic Shock)  Goal: Absence of Infection Signs and Symptoms  Outcome: Ongoing, Progressing     Problem: Nutrition Impaired (Sepsis/Septic Shock)  Goal: Optimal Nutrition Intake  Outcome: Ongoing, Progressing     Problem: Skin Injury Risk Increased  Goal: Skin Health and Integrity  Outcome: Ongoing, Progressing     Problem: Fluid Imbalance (Pneumonia)  Goal: Fluid Balance  Outcome: Ongoing, Progressing     Problem: Infection (Pneumonia)  Goal: Resolution of Infection Signs and Symptoms  Outcome: Ongoing, Progressing     Problem: Respiratory Compromise (Pneumonia)  Goal: Effective Oxygenation and Ventilation  Outcome: Ongoing, Progressing     Problem: Fall Injury Risk  Goal: Absence of Fall and Fall-Related Injury  Outcome: Ongoing, Progressing   Plan of care discussed with patient.  Patient ambulating with assist, fall precautions in place. LVAD DP and numbers WNL, smooth LVAD hum. Patient has no complaints of pain. Discussed medications and care.  Patient has no questions at this time. Will continue to monitor. AAOx4 and  VSS.

## 2024-04-18 NOTE — PLAN OF CARE
Per MD, Tylenol given for T100.9 & 100.2. pt has no complaints. Other VSS, LVAD# WNL. I&O monitored. BG checked. Pt repositioned in bed  Pt had emesis x3 per wife yellowish color. HTS aware    Problem: Infection Progression (Sepsis/Septic Shock)  Goal: Absence of Infection Signs and Symptoms  Outcome: Ongoing, Progressing     Problem: Respiratory Compromise (Pneumonia)  Goal: Effective Oxygenation and Ventilation  Outcome: Ongoing, Progressing     Problem: Fall Injury Risk  Goal: Absence of Fall and Fall-Related Injury  Outcome: Ongoing, Progressing

## 2024-04-19 PROBLEM — I50.42 CHRONIC COMBINED SYSTOLIC AND DIASTOLIC HEART FAILURE: Status: ACTIVE | Noted: 2023-10-07

## 2024-04-19 PROBLEM — J18.9 PNEUMONIA OF RIGHT LUNG DUE TO INFECTIOUS ORGANISM: Status: ACTIVE | Noted: 2024-04-19

## 2024-04-19 LAB
ALBUMIN SERPL BCP-MCNC: 3 G/DL (ref 3.5–5.2)
ALP SERPL-CCNC: 139 U/L (ref 55–135)
ALT SERPL W/O P-5'-P-CCNC: 516 U/L (ref 10–44)
ANION GAP SERPL CALC-SCNC: 19 MMOL/L (ref 8–16)
APTT PPP: 45.3 SEC (ref 21–32)
AST SERPL-CCNC: 644 U/L (ref 10–40)
BACTERIA #/AREA URNS AUTO: ABNORMAL /HPF
BACTERIA BLD CULT: ABNORMAL
BASOPHILS # BLD AUTO: 0.02 K/UL (ref 0–0.2)
BASOPHILS NFR BLD: 0.2 % (ref 0–1.9)
BILIRUB DIRECT SERPL-MCNC: 0.6 MG/DL (ref 0.1–0.3)
BILIRUB SERPL-MCNC: 0.8 MG/DL (ref 0.1–1)
BILIRUB UR QL STRIP: NEGATIVE
BNP SERPL-MCNC: 409 PG/ML (ref 0–99)
BUN SERPL-MCNC: 98 MG/DL (ref 8–23)
CALCIUM SERPL-MCNC: 9.2 MG/DL (ref 8.7–10.5)
CHLORIDE SERPL-SCNC: 92 MMOL/L (ref 95–110)
CLARITY UR REFRACT.AUTO: CLEAR
CO2 SERPL-SCNC: 24 MMOL/L (ref 23–29)
COLOR UR AUTO: YELLOW
CREAT SERPL-MCNC: 6 MG/DL (ref 0.5–1.4)
CRP SERPL-MCNC: 247.1 MG/L (ref 0–8.2)
DIFFERENTIAL METHOD BLD: ABNORMAL
EOSINOPHIL # BLD AUTO: 0 K/UL (ref 0–0.5)
EOSINOPHIL NFR BLD: 0 % (ref 0–8)
ERYTHROCYTE [DISTWIDTH] IN BLOOD BY AUTOMATED COUNT: 16.8 % (ref 11.5–14.5)
EST. GFR  (NO RACE VARIABLE): 9.9 ML/MIN/1.73 M^2
GLUCOSE SERPL-MCNC: 104 MG/DL (ref 70–110)
GLUCOSE UR QL STRIP: NEGATIVE
HCT VFR BLD AUTO: 29.5 % (ref 40–54)
HGB BLD-MCNC: 9.7 G/DL (ref 14–18)
HGB UR QL STRIP: ABNORMAL
HYALINE CASTS UR QL AUTO: 0 /LPF
IMM GRANULOCYTES # BLD AUTO: 0.06 K/UL (ref 0–0.04)
IMM GRANULOCYTES NFR BLD AUTO: 0.7 % (ref 0–0.5)
INR PPP: 2.2 (ref 0.8–1.2)
KETONES UR QL STRIP: NEGATIVE
LDH SERPL L TO P-CCNC: 1048 U/L (ref 110–260)
LEUKOCYTE ESTERASE UR QL STRIP: NEGATIVE
LYMPHOCYTES # BLD AUTO: 0.3 K/UL (ref 1–4.8)
LYMPHOCYTES NFR BLD: 3.7 % (ref 18–48)
MAGNESIUM SERPL-MCNC: 2.1 MG/DL (ref 1.6–2.6)
MCH RBC QN AUTO: 28.4 PG (ref 27–31)
MCHC RBC AUTO-ENTMCNC: 32.9 G/DL (ref 32–36)
MCV RBC AUTO: 87 FL (ref 82–98)
MICROSCOPIC COMMENT: ABNORMAL
MONOCYTES # BLD AUTO: 0.5 K/UL (ref 0.3–1)
MONOCYTES NFR BLD: 5.3 % (ref 4–15)
NEUTROPHILS # BLD AUTO: 7.9 K/UL (ref 1.8–7.7)
NEUTROPHILS NFR BLD: 90.1 % (ref 38–73)
NITRITE UR QL STRIP: NEGATIVE
NRBC BLD-RTO: 0 /100 WBC
OHS QRS DURATION: 164 MS
OHS QTC CALCULATION: 546 MS
PH UR STRIP: 5 [PH] (ref 5–8)
PHOSPHATE SERPL-MCNC: 6.2 MG/DL (ref 2.7–4.5)
PLATELET # BLD AUTO: 362 K/UL (ref 150–450)
PMV BLD AUTO: 9.9 FL (ref 9.2–12.9)
POCT GLUCOSE: 117 MG/DL (ref 70–110)
POCT GLUCOSE: 158 MG/DL (ref 70–110)
POCT GLUCOSE: 162 MG/DL (ref 70–110)
POCT GLUCOSE: 163 MG/DL (ref 70–110)
POTASSIUM SERPL-SCNC: 3.6 MMOL/L (ref 3.5–5.1)
PREALB SERPL-MCNC: 24 MG/DL (ref 20–43)
PROT SERPL-MCNC: 8.2 G/DL (ref 6–8.4)
PROT UR QL STRIP: ABNORMAL
PROTHROMBIN TIME: 22.5 SEC (ref 9–12.5)
RBC # BLD AUTO: 3.41 M/UL (ref 4.6–6.2)
RBC #/AREA URNS AUTO: 8 /HPF (ref 0–4)
SODIUM SERPL-SCNC: 135 MMOL/L (ref 136–145)
SP GR UR STRIP: 1.01 (ref 1–1.03)
SQUAMOUS #/AREA URNS AUTO: 0 /HPF
URN SPEC COLLECT METH UR: ABNORMAL
VANCOMYCIN SERPL-MCNC: 19 UG/ML
WBC # BLD AUTO: 8.74 K/UL (ref 3.9–12.7)
WBC #/AREA URNS AUTO: 1 /HPF (ref 0–5)

## 2024-04-19 PROCEDURE — 93010 ELECTROCARDIOGRAM REPORT: CPT | Mod: ,,, | Performed by: INTERNAL MEDICINE

## 2024-04-19 PROCEDURE — 85025 COMPLETE CBC W/AUTO DIFF WBC: CPT | Performed by: STUDENT IN AN ORGANIZED HEALTH CARE EDUCATION/TRAINING PROGRAM

## 2024-04-19 PROCEDURE — 97116 GAIT TRAINING THERAPY: CPT

## 2024-04-19 PROCEDURE — 25000003 PHARM REV CODE 250: Performed by: INTERNAL MEDICINE

## 2024-04-19 PROCEDURE — 81001 URINALYSIS AUTO W/SCOPE: CPT | Performed by: INTERNAL MEDICINE

## 2024-04-19 PROCEDURE — 93005 ELECTROCARDIOGRAM TRACING: CPT

## 2024-04-19 PROCEDURE — 93750 INTERROGATION VAD IN PERSON: CPT | Mod: ,,, | Performed by: INTERNAL MEDICINE

## 2024-04-19 PROCEDURE — 86140 C-REACTIVE PROTEIN: CPT | Performed by: STUDENT IN AN ORGANIZED HEALTH CARE EDUCATION/TRAINING PROGRAM

## 2024-04-19 PROCEDURE — 97530 THERAPEUTIC ACTIVITIES: CPT

## 2024-04-19 PROCEDURE — 85730 THROMBOPLASTIN TIME PARTIAL: CPT | Performed by: STUDENT IN AN ORGANIZED HEALTH CARE EDUCATION/TRAINING PROGRAM

## 2024-04-19 PROCEDURE — 20600001 HC STEP DOWN PRIVATE ROOM

## 2024-04-19 PROCEDURE — 84100 ASSAY OF PHOSPHORUS: CPT | Performed by: STUDENT IN AN ORGANIZED HEALTH CARE EDUCATION/TRAINING PROGRAM

## 2024-04-19 PROCEDURE — 83880 ASSAY OF NATRIURETIC PEPTIDE: CPT | Performed by: STUDENT IN AN ORGANIZED HEALTH CARE EDUCATION/TRAINING PROGRAM

## 2024-04-19 PROCEDURE — 80048 BASIC METABOLIC PNL TOTAL CA: CPT | Performed by: STUDENT IN AN ORGANIZED HEALTH CARE EDUCATION/TRAINING PROGRAM

## 2024-04-19 PROCEDURE — 27000248 HC VAD-ADDITIONAL DAY

## 2024-04-19 PROCEDURE — 63600175 PHARM REV CODE 636 W HCPCS: Performed by: INTERNAL MEDICINE

## 2024-04-19 PROCEDURE — 99233 SBSQ HOSP IP/OBS HIGH 50: CPT | Mod: ,,, | Performed by: PHYSICIAN ASSISTANT

## 2024-04-19 PROCEDURE — 83615 LACTATE (LD) (LDH) ENZYME: CPT | Performed by: STUDENT IN AN ORGANIZED HEALTH CARE EDUCATION/TRAINING PROGRAM

## 2024-04-19 PROCEDURE — 83735 ASSAY OF MAGNESIUM: CPT | Performed by: STUDENT IN AN ORGANIZED HEALTH CARE EDUCATION/TRAINING PROGRAM

## 2024-04-19 PROCEDURE — 27000207 HC ISOLATION

## 2024-04-19 PROCEDURE — 99233 SBSQ HOSP IP/OBS HIGH 50: CPT | Mod: ,,, | Performed by: INTERNAL MEDICINE

## 2024-04-19 PROCEDURE — 84134 ASSAY OF PREALBUMIN: CPT | Performed by: STUDENT IN AN ORGANIZED HEALTH CARE EDUCATION/TRAINING PROGRAM

## 2024-04-19 PROCEDURE — 36415 COLL VENOUS BLD VENIPUNCTURE: CPT | Performed by: INTERNAL MEDICINE

## 2024-04-19 PROCEDURE — 85610 PROTHROMBIN TIME: CPT | Performed by: STUDENT IN AN ORGANIZED HEALTH CARE EDUCATION/TRAINING PROGRAM

## 2024-04-19 PROCEDURE — 80076 HEPATIC FUNCTION PANEL: CPT | Performed by: STUDENT IN AN ORGANIZED HEALTH CARE EDUCATION/TRAINING PROGRAM

## 2024-04-19 PROCEDURE — 25000003 PHARM REV CODE 250: Performed by: STUDENT IN AN ORGANIZED HEALTH CARE EDUCATION/TRAINING PROGRAM

## 2024-04-19 PROCEDURE — 80202 ASSAY OF VANCOMYCIN: CPT | Performed by: INTERNAL MEDICINE

## 2024-04-19 RX ORDER — ONDANSETRON HYDROCHLORIDE 2 MG/ML
4 INJECTION, SOLUTION INTRAVENOUS EVERY 6 HOURS PRN
Status: DISCONTINUED | OUTPATIENT
Start: 2024-04-19 | End: 2024-04-19

## 2024-04-19 RX ADMIN — AMPICILLIN 2 G: 2 INJECTION, POWDER, FOR SOLUTION INTRAMUSCULAR; INTRAVENOUS at 09:04

## 2024-04-19 RX ADMIN — CEFTRIAXONE 2 G: 2 INJECTION, POWDER, FOR SOLUTION INTRAMUSCULAR; INTRAVENOUS at 07:04

## 2024-04-19 RX ADMIN — VANCOMYCIN HYDROCHLORIDE 500 MG: 500 INJECTION, POWDER, LYOPHILIZED, FOR SOLUTION INTRAVENOUS at 12:04

## 2024-04-19 RX ADMIN — WARFARIN SODIUM 2.5 MG: 2.5 TABLET ORAL at 05:04

## 2024-04-19 RX ADMIN — VENLAFAXINE 37.5 MG: 37.5 TABLET ORAL at 09:04

## 2024-04-19 RX ADMIN — SODIUM BICARBONATE 325 MG: 325 TABLET ORAL at 08:04

## 2024-04-19 RX ADMIN — Medication 400 MG: at 08:04

## 2024-04-19 RX ADMIN — AMPICILLIN 2 G: 2 INJECTION, POWDER, FOR SOLUTION INTRAMUSCULAR; INTRAVENOUS at 08:04

## 2024-04-19 RX ADMIN — HYDRALAZINE HYDROCHLORIDE 75 MG: 50 TABLET ORAL at 02:04

## 2024-04-19 RX ADMIN — AMIODARONE HYDROCHLORIDE 400 MG: 200 TABLET ORAL at 08:04

## 2024-04-19 RX ADMIN — SODIUM BICARBONATE 325 MG: 325 TABLET ORAL at 09:04

## 2024-04-19 RX ADMIN — FAMOTIDINE 20 MG: 20 TABLET ORAL at 08:04

## 2024-04-19 RX ADMIN — SODIUM BICARBONATE 325 MG: 325 TABLET ORAL at 02:04

## 2024-04-19 RX ADMIN — TRAZODONE HYDROCHLORIDE 25 MG: 50 TABLET ORAL at 09:04

## 2024-04-19 RX ADMIN — HYDRALAZINE HYDROCHLORIDE 75 MG: 50 TABLET ORAL at 06:04

## 2024-04-19 RX ADMIN — HYDRALAZINE HYDROCHLORIDE 75 MG: 50 TABLET ORAL at 09:04

## 2024-04-19 RX ADMIN — REMDESIVIR 100 MG: 100 INJECTION, POWDER, LYOPHILIZED, FOR SOLUTION INTRAVENOUS at 08:04

## 2024-04-19 NOTE — PT/OT/SLP PROGRESS
Occupational Therapy   Treatment    Name: Radha Abbott  MRN: [id number]  Admitting Diagnosis:  Pneumonia due to COVID-19 virus       Recommendations:     Discharge Recommendations: Low Intensity Therapy  Discharge Equipment Recommendations:  walker, rolling  Barriers to discharge:  None    Assessment:     Radha Abbott is a 64 y.o. male with a medical diagnosis of Pneumonia due to COVID-19 virus.  He presents with the following performance deficits affecting function are weakness, impaired endurance, impaired self care skills, impaired functional mobility, gait instability, impaired balance, decreased coordination, decreased upper extremity function, decreased lower extremity function, impaired cardiopulmonary response to activity. Pt tolerated session fairly well, which focused on continuing to improve OOB tolerance required for increased (I) with functional tasks. He required assistance for initial bed mobility, but was able to demo good participation and tolerance with in room mobility this date. Educated him and his wife on ways to prevent deconditioning while admitted and both verbalized understanding. Pt would benefit from continued skilled acute OT services in order to maximize (I) and with ADLs and functional mobility to ensure safe return to PLOF in the least restrictive environment. OT recommending low intensity therapy once pt is medically appropriate for d/c.       Rehab Prognosis:  Good; patient would benefit from acute skilled OT services to address these deficits and reach maximum level of function.       Plan:     Patient to be seen 4 x/week to address the above listed problems via self-care/home management, therapeutic activities, therapeutic exercises, neuromuscular re-education  Plan of Care Expires: 05/17/24  Plan of Care Reviewed with: patient, spouse    Subjective     Chief Complaint: None   Patient/Family Comments/goals: return to PLOF   Pain/Comfort:  Pain Rating 1: 0/10  Pain Rating  Post-Intervention 1: 0/10    Objective:     Communicated with: RN prior to session.  Patient found HOB elevated with peripheral IV, telemetry, LVAD upon OT entry to room.    General Precautions: Standard, fall, LVAD, airborne, contact, droplet    Orthopedic Precautions:N/A  Braces: N/A  Respiratory Status: Room air     Occupational Performance:     Bed Mobility:    Patient completed Supine to Sit with minimum assistance     Functional Mobility/Transfers:  Patient completed Sit <> Stand Transfer with contact guard assistance  with  rolling walker   Patient completed Bed <> Chair Transfer using Step Transfer technique with contact guard assistance with rolling walker  Functional Mobility: Pt participated in room mobility to simulate home and community distances for 25 ft with CGA and RW.       Activities of Daily Living:  Denied ADLs; performed prior to arrival of therapist       [facility] 6 Click ADL: 20    Treatment & Education:   Pt and wife educated on:   Role of OT, POC, and d/c planning.   Safe transfer techniques and proper body mechanics for fall prevention and improved independence with functional transfers   Importance of OOB activities to increase endurance and tolerance for increased participation in daily ADLs.   Utilizing the call bell to request for assistance with all functional mobility to ensure safety during hospital stay.      Pt and spouse verbalized understanding and all questions were addressed within the scope of OT.     Patient left up in chair with all lines intact and call button in reach    GOALS:   Multidisciplinary Problems       Occupational Therapy Goals          Problem: Occupational Therapy    Goal Priority Disciplines Outcome Interventions   Occupational Therapy Goal     OT, PT/OT Ongoing, Progressing    Description: Goals to be met by: 5/17/24     Patient will increase functional independence with ADLs by performing:    UE Dressing with Modified [place].  LE Dressing with Modified  [place].  Grooming while standing at sink with Modified [place].  Toileting from toilet/bedside commode with Modified [place] for hygiene and clothing management.   Toilet transfer to toilet/bedside commode with Modified [place].                         Time Tracking:     OT Date of Treatment: 04/19/24  OT Start Time: 1334  OT Stop Time: 1348  OT Total Time (min): 14 min    Billable Minutes:Therapeutic Activity 14    OT/PRIETO: OT          4/19/2024   Co-treatment performed due to patient's poor activity tolerance at this time 2/2 COVID.

## 2024-04-19 NOTE — PROGRESS NOTES
VANCOMYCIN DOSING BY PHARMACY DISCONTINUATION NOTE    Radha Abbott is a 64 y.o. male who had been consulted for vancomycin dosing.    The pharmacy consult for vancomycin dosing has been discontinued.     Vancomycin Dosing by Pharmacy Consult will sign-off. Please reconsult if necessary. Thank you for allowing us to participate in this patient's care.     Maxim Joshi, SonaliD

## 2024-04-19 NOTE — CARE UPDATE
Care Update:     No acute events overnight. Patient in room 311/311 A. Blood glucose stable on current inpatient regimen.No hypoglycemia noted over the past 24-hours. Endocrine will continue to follow and manage insulin orders inpatient.       Steroid use- None.   Renal function-   Lab Results   Component Value Date    CREATININE 5.0 (H) 04/18/2024        Diet Renal Coumadin Restriction; Fluid - 1500mL; Isolation Tray - Regular [place]     POCT Glucose   Date Value Ref Range Status   04/18/2024 209 (H) 70 - 110 mg/dL Final   04/18/2024 142 (H) 70 - 110 mg/dL Final   04/18/2024 155 (H) 70 - 110 mg/dL Final   04/18/2024 149 (H) 70 - 110 mg/dL Final   04/17/2024 154 (H) 70 - 110 mg/dL Final   04/17/2024 102 70 - 110 mg/dL Final   04/17/2024 190 (H) 70 - 110 mg/dL Final   04/17/2024 145 (H) 70 - 110 mg/dL Final     Lab Results   Component Value Date    HGBA1C 7.4 (H) 04/17/2024       Endocrine  Type 2 diabetes mellitus without complication, without long-term current use of insulin  Endocrinology consulted for BG management.   BG goal 140-180     - Novolog (Insulin Aspart) prn for BG excursions LDC SSI (150/50)  - BG checks AC/HS  - Hypoglycemia protocol in place     ** Please notify Endocrine for any change and/or advance in diet**  ** Please call Endocrine for any BG related issues **     Discharge Planning:   TBD. Please notify endocrinology prior to discharge.    Erasmo Parrish DNP, FNP-C  Department of Endocrinology  Inpatient Glycemic Management

## 2024-04-19 NOTE — PROGRESS NOTES
Standing weight    Patient declined to get his AM weight this morning. Says he would like to get it later today.

## 2024-04-19 NOTE — SUBJECTIVE & OBJECTIVE
Interval History: No adverse events.    Review of Systems   Constitutional:  Positive for fatigue and fever.   Musculoskeletal:  Positive for back pain.   All other systems reviewed and are negative.    Objective:     Vital Signs (Most Recent):  Temp: 98.1 °F (36.7 °C) (04/18/24 1606)  Pulse: 81 (04/18/24 1800)  Resp: 18 (04/18/24 1606)  BP: (!) 66/0 (04/18/24 1956)  SpO2: 96 % (04/18/24 1606) Vital Signs (24h Range):  Temp:  [98.1 °F (36.7 °C)-100.9 °F (38.3 °C)] 98.1 °F (36.7 °C)  Pulse:  [] 81  Resp:  [18-19] 18  SpO2:  [93 %-98 %] 96 %  BP: (62-93)/(0-68) 66/0     Weight: 75 kg (165 lb 5.5 oz)  Body mass index is 21.23 kg/m².    Estimated Creatinine Clearance: 16.3 mL/min (A) (based on SCr of 5 mg/dL (H)).     Physical Exam  Vitals and nursing note reviewed.   Constitutional:       General: He is not in acute distress.     Appearance: He is well-developed. He is not diaphoretic.   HENT:      Head: Normocephalic and atraumatic.      Right Ear: External ear normal.      Left Ear: External ear normal.      Nose: Nose normal.      Mouth/Throat:      Pharynx: No oropharyngeal exudate.   Eyes:      General: No scleral icterus.        Right eye: No discharge.         Left eye: No discharge.      Conjunctiva/sclera: Conjunctivae normal.      Pupils: Pupils are equal, round, and reactive to light.   Neck:      Thyroid: No thyromegaly.      Vascular: No JVD.      Trachea: No tracheal deviation.   Cardiovascular:      Comments: LVAD hum  Pulmonary:      Effort: Pulmonary effort is normal. No respiratory distress.      Breath sounds: Normal breath sounds. No stridor. No wheezing or rales.   Chest:      Chest wall: No tenderness.   Abdominal:      General: Bowel sounds are normal. There is no distension.      Palpations: Abdomen is soft. There is no mass.      Tenderness: There is no abdominal tenderness. There is no guarding or rebound.      Comments: LVAD drive line present.   Musculoskeletal:         General: No  tenderness. Normal range of motion.      Cervical back: Normal range of motion and neck supple.   Lymphadenopathy:      Cervical: No cervical adenopathy.   Skin:     General: Skin is warm.      Coloration: Skin is not pale.      Findings: No erythema or rash.   Neurological:      Mental Status: He is alert and oriented to person, place, and time.      Cranial Nerves: No cranial nerve deficit.      Motor: No abnormal muscle tone.      Coordination: Coordination normal.      Deep Tendon Reflexes: Reflexes normal.          Significant Labs:   Microbiology Results (last 7 days)       Procedure Component Value Units Date/Time    Blood culture [[id number]] Collected: 04/18/24 0535    Order Status: Completed Specimen: Blood Updated: 04/18/24 1545     Blood Culture, Routine No Growth to date    Blood culture [[id number]] Collected: 04/18/24 0534    Order Status: Completed Specimen: Blood Updated: 04/18/24 1545     Blood Culture, Routine No Growth to date    Blood culture x two cultures. Draw prior to antibiotics. [[id number]]  (Abnormal) Collected: 04/16/24 2322    Order Status: Completed Specimen: Blood from Peripheral, Hand, Right Updated: 04/18/24 1445     Blood Culture, Routine Gram stain aer bottle: Gram positive cocci      Gram stain nata bottle: Gram positive cocci      Positive results previously called 04/17/2024 15:08      ENTEROCOCCUS FAECALIS  For susceptibility see order #[id number]      Narrative:      Aerobic and anaerobic    Blood culture x two cultures. Draw prior to antibiotics. [[id number]]  (Abnormal) Collected: 04/16/24 2322    Order Status: Completed Specimen: Blood from Peripheral, Hand, Left Updated: 04/18/24 1445     Blood Culture, Routine Gram stain aer bottle: Gram positive cocci      Gram stain nata bottle: Gram positive cocci      Results called to and read back by: Rachael Horan RN 04/17/2024  15:08      ENTEROCOCCUS FAECALIS  Susceptibility pending      Narrative:      Aerobic and anaerobic     Rapid Organism ID by PCR (from Blood culture) [[id number]]  (Abnormal) Collected: 04/16/24 4192    Order Status: Completed Updated: 04/17/24 1726     Enterococcus faecalis Detected     Enterococcus faecium Not Detected     Listeria monocytogenes Not Detected     Staphylococcus spp. Not Detected     Staphylococcus aureus Not Detected     Staphylococcus epidermidis Not Detected     Staphylococcus lugdunensis Not Detected     Streptococcus species Not Detected     Streptococcus agalactiae Not Detected     Streptococcus pneumoniae Not Detected     Streptococcus pyogenes Not Detected     Acinetobacter calcoaceticus/baumannii complex Not Detected     Bacteroides fragilis Not Detected     Enterobacterales Not Detected     Enterobacter cloacae complex Not Detected     Escherichia coli Not Detected     Klebsiella aerogenes Not Detected     Klebsiella oxytoca Not Detected     Klebsiella pneumoniae group Not Detected     Proteus Not Detected     Salmonella sp Not Detected     Serratia marcescens Not Detected     Haemophilus influenzae Not Detected     Neisseria meningtidis Not Detected     Pseudomonas aeruginosa Not Detected     Stenotrophomonas maltophilia Not Detected     Candida albicans Not Detected     Candida auris Not Detected     Candida glabrata Not Detected     Candida krusei Not Detected     Candida parapsilosis Not Detected     Candida tropicalis Not Detected     Cryptococcus neoformans/gattii Not Detected     CTX-M (ESBL ) Test Not Applicable     IMP (Carbapenem resistant) Test Not Applicable     KPC resistance gene (Carbapenem resistant) Test Not Applicable     mcr-1  Test Not Applicable     mec A/C  Test Not Applicable     mec A/C and MREJ (MRSA) gene Test Not Applicable     NDM (Carbapenem resistant) Test Not Applicable     OXA-48-like (Carbapenem resistant) Test Not Applicable     van A/B (VRE gene) Not Detected     VIM (Carbapenem resistant) Test Not Applicable    Narrative:      Aerobic and anaerobic     Influenza A & B by Molecular [[id number]] Collected: 04/16/24 2347    Order Status: Completed Specimen: Nasopharyngeal Swab Updated: 04/17/24 0059     Influenza A, Molecular Negative     Influenza B, Molecular Negative     Flu A & B Source Nasal swab            Significant Imaging: I have reviewed all pertinent imaging results/findings within the past 24 hours.

## 2024-04-19 NOTE — PLAN OF CARE
Problem: Adult Inpatient Plan of Care  Goal: Plan of Care Review  Outcome: Ongoing, Progressing  Goal: Patient-Specific Goal (Individualized)  Outcome: Ongoing, Progressing  Goal: Absence of Hospital-Acquired Illness or Injury  Outcome: Ongoing, Progressing  Goal: Optimal Comfort and Wellbeing  Outcome: Ongoing, Progressing  Goal: Readiness for Transition of Care  Outcome: Ongoing, Progressing     Problem: Diabetes Comorbidity  Goal: Blood Glucose Level Within Targeted Range  Outcome: Ongoing, Progressing     Problem: Adjustment to Illness (Sepsis/Septic Shock)  Goal: Optimal Coping  Outcome: Ongoing, Progressing     Problem: Bleeding (Sepsis/Septic Shock)  Goal: Absence of Bleeding  Outcome: Ongoing, Progressing     Problem: Glycemic Control Impaired (Sepsis/Septic Shock)  Goal: Blood Glucose Level Within Desired Range  Outcome: Ongoing, Progressing     Problem: Infection Progression (Sepsis/Septic Shock)  Goal: Absence of Infection Signs and Symptoms  Outcome: Ongoing, Progressing     Problem: Nutrition Impaired (Sepsis/Septic Shock)  Goal: Optimal Nutrition Intake  Outcome: Ongoing, Progressing     Problem: Skin Injury Risk Increased  Goal: Skin Health and Integrity  Outcome: Ongoing, Progressing     Problem: Fluid Imbalance (Pneumonia)  Goal: Fluid Balance  Outcome: Ongoing, Progressing     Problem: Infection (Pneumonia)  Goal: Resolution of Infection Signs and Symptoms  Outcome: Ongoing, Progressing     Problem: Respiratory Compromise (Pneumonia)  Goal: Effective Oxygenation and Ventilation  Outcome: Ongoing, Progressing     Problem: Fall Injury Risk  Goal: Absence of Fall and Fall-Related Injury  Outcome: Ongoing, Progressing  Plan of care discussed with patient.  Patient ambulating with assist, fall precautions in place. LVAD DP and numbers WNL, smooth LVAD hum. Patient has no complaints of pain. Discussed medications and care.  Patient has no questions at this time. Will continue to monitor.  Pt getting IV  ABX. Pt AAOx4 and VSS.

## 2024-04-19 NOTE — PROGRESS NOTES
[facility]  [facility]  Progress Note    Patient Name: Radha Abbott  MRN: [id number]  Admission Date: 4/16/2024  Length of Stay: 2 days  Attending Physician: Brayan Ocampo MD  Primary Care Provider: Vasu Kong MD    Isolation Status: Airborne and Contact and Droplet  Assessment/Plan:      Pulmonary  * Pneumonia due to COVID-19 virus  Patient reports receipt of COVID primary vaccine series and 1 booster.  Patient is on room air.  Small nodular opacification to right lung may represent bacterial superinfection.    Plan  IV remdesivir for 3 days  On vancomycin and ampicillin.    ID  Bacteremia  64 year old male with a history of LVAD placement in December 2023 and AICD in place..  Patient is admitted with E faecalis bacteremia.  Source is unclear.  Per patient's wife, his drive line exit site looked clean.   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  TTE was negative for vegetation.  However TTE does not rule out valvular or lead vegetation.    Plan  Continue IV vancomycin.  IV ampicillin started as the E faecalis isolate is likely sensitive to this.  Follow up sensitivities.  Will treat this as endocarditis with 6 weeks of IV antibiotics followed by suppressive therapy.        Anticipated Disposition: TBD    Thank you for your consult. I will follow-up with patient. Please contact us if you have any additional questions.    Axel Caban MD  Infectious Disease  [facility]    Subjective:     Principal Problem:Pneumonia due to COVID-19 virus    HPI: 64 year old male with a history of HFrEF s/p LVAD  placement in December of 2023.  Patient is admitted to the hospital with a 4 day history of fatigue and fevers.  He denies any other symptoms.  He reports some occasional cough.  He has had some lower back pain.  COVID testing was positive.  Blood cultures are now also positive for GPC.  Per wife, she hasn't had any with drainage from his drive line exit  site.  Interval History: No adverse events.    Review of Systems   Constitutional:  Positive for fatigue and fever.   Musculoskeletal:  Positive for back pain.   All other systems reviewed and are negative.    Objective:     Vital Signs (Most Recent):  Temp: 98.1 °F (36.7 °C) (04/18/24 1606)  Pulse: 81 (04/18/24 1800)  Resp: 18 (04/18/24 1606)  BP: (!) 66/0 (04/18/24 1956)  SpO2: 96 % (04/18/24 1606) Vital Signs (24h Range):  Temp:  [98.1 °F (36.7 °C)-100.9 °F (38.3 °C)] 98.1 °F (36.7 °C)  Pulse:  [] 81  Resp:  [18-19] 18  SpO2:  [93 %-98 %] 96 %  BP: (62-93)/(0-68) 66/0     Weight: 75 kg (165 lb 5.5 oz)  Body mass index is 21.23 kg/m².    Estimated Creatinine Clearance: 16.3 mL/min (A) (based on SCr of 5 mg/dL (H)).     Physical Exam  Vitals and nursing note reviewed.   Constitutional:       General: He is not in acute distress.     Appearance: He is well-developed. He is not diaphoretic.   HENT:      Head: Normocephalic and atraumatic.      Right Ear: External ear normal.      Left Ear: External ear normal.      Nose: Nose normal.      Mouth/Throat:      Pharynx: No oropharyngeal exudate.   Eyes:      General: No scleral icterus.        Right eye: No discharge.         Left eye: No discharge.      Conjunctiva/sclera: Conjunctivae normal.      Pupils: Pupils are equal, round, and reactive to light.   Neck:      Thyroid: No thyromegaly.      Vascular: No JVD.      Trachea: No tracheal deviation.   Cardiovascular:      Comments: LVAD hum  Pulmonary:      Effort: Pulmonary effort is normal. No respiratory distress.      Breath sounds: Normal breath sounds. No stridor. No wheezing or rales.   Chest:      Chest wall: No tenderness.   Abdominal:      General: Bowel sounds are normal. There is no distension.      Palpations: Abdomen is soft. There is no mass.      Tenderness: There is no abdominal tenderness. There is no guarding or rebound.      Comments: LVAD drive line present.   Musculoskeletal:         General:  No tenderness. Normal range of motion.      Cervical back: Normal range of motion and neck supple.   Lymphadenopathy:      Cervical: No cervical adenopathy.   Skin:     General: Skin is warm.      Coloration: Skin is not pale.      Findings: No erythema or rash.   Neurological:      Mental Status: He is alert and oriented to person, place, and time.      Cranial Nerves: No cranial nerve deficit.      Motor: No abnormal muscle tone.      Coordination: Coordination normal.      Deep Tendon Reflexes: Reflexes normal.          Significant Labs:   Microbiology Results (last 7 days)       Procedure Component Value Units Date/Time    Blood culture [[id number]] Collected: 04/18/24 0535    Order Status: Completed Specimen: Blood Updated: 04/18/24 1545     Blood Culture, Routine No Growth to date    Blood culture [[id number]] Collected: 04/18/24 0534    Order Status: Completed Specimen: Blood Updated: 04/18/24 1545     Blood Culture, Routine No Growth to date    Blood culture x two cultures. Draw prior to antibiotics. [[id number]]  (Abnormal) Collected: 04/16/24 2322    Order Status: Completed Specimen: Blood from Peripheral, Hand, Right Updated: 04/18/24 1445     Blood Culture, Routine Gram stain aer bottle: Gram positive cocci      Gram stain nata bottle: Gram positive cocci      Positive results previously called 04/17/2024 15:08      ENTEROCOCCUS FAECALIS  For susceptibility see order #[id number]      Narrative:      Aerobic and anaerobic    Blood culture x two cultures. Draw prior to antibiotics. [[id number]]  (Abnormal) Collected: 04/16/24 2322    Order Status: Completed Specimen: Blood from Peripheral, Hand, Left Updated: 04/18/24 1445     Blood Culture, Routine Gram stain aer bottle: Gram positive cocci      Gram stain nata bottle: Gram positive cocci      Results called to and read back by: Rachael Horan RN 04/17/2024  15:08      ENTEROCOCCUS FAECALIS  Susceptibility pending      Narrative:      Aerobic and anaerobic     Rapid Organism ID by PCR (from Blood culture) [[id number]]  (Abnormal) Collected: 04/16/24 5452    Order Status: Completed Updated: 04/17/24 1726     Enterococcus faecalis Detected     Enterococcus faecium Not Detected     Listeria monocytogenes Not Detected     Staphylococcus spp. Not Detected     Staphylococcus aureus Not Detected     Staphylococcus epidermidis Not Detected     Staphylococcus lugdunensis Not Detected     Streptococcus species Not Detected     Streptococcus agalactiae Not Detected     Streptococcus pneumoniae Not Detected     Streptococcus pyogenes Not Detected     Acinetobacter calcoaceticus/baumannii complex Not Detected     Bacteroides fragilis Not Detected     Enterobacterales Not Detected     Enterobacter cloacae complex Not Detected     Escherichia coli Not Detected     Klebsiella aerogenes Not Detected     Klebsiella oxytoca Not Detected     Klebsiella pneumoniae group Not Detected     Proteus Not Detected     Salmonella sp Not Detected     Serratia marcescens Not Detected     Haemophilus influenzae Not Detected     Neisseria meningtidis Not Detected     Pseudomonas aeruginosa Not Detected     Stenotrophomonas maltophilia Not Detected     Candida albicans Not Detected     Candida auris Not Detected     Candida glabrata Not Detected     Candida krusei Not Detected     Candida parapsilosis Not Detected     Candida tropicalis Not Detected     Cryptococcus neoformans/gattii Not Detected     CTX-M (ESBL ) Test Not Applicable     IMP (Carbapenem resistant) Test Not Applicable     KPC resistance gene (Carbapenem resistant) Test Not Applicable     mcr-1  Test Not Applicable     mec A/C  Test Not Applicable     mec A/C and MREJ (MRSA) gene Test Not Applicable     NDM (Carbapenem resistant) Test Not Applicable     OXA-48-like (Carbapenem resistant) Test Not Applicable     van A/B (VRE gene) Not Detected     VIM (Carbapenem resistant) Test Not Applicable    Narrative:      Aerobic and anaerobic     Influenza A & B by Molecular [[id number]] Collected: 04/16/24 2347    Order Status: Completed Specimen: Nasopharyngeal Swab Updated: 04/17/24 0059     Influenza A, Molecular Negative     Influenza B, Molecular Negative     Flu A & B Source Nasal swab            Significant Imaging: I have reviewed all pertinent imaging results/findings within the past 24 hours.

## 2024-04-19 NOTE — NURSING
[facility] called about increasing lab levels. Spoke to MD Jesus, team is aware. Holding Bumex due to creatinine increasing to 6.0.

## 2024-04-19 NOTE — PROGRESS NOTES
[facility] - Cardiology Step[facility]  Heart Transplant  Progress Note    Patient Name: Radha Abbott  MRN: [id number]  Admission Date: 4/16/2024  Hospital Length of Stay: 3 days  Attending Physician: Brayan Ocampo MD  Primary Care Provider: Vasu Kong MD  Principal Problem:Pneumonia due to COVID-19 virus    Subjective:   Interval History: Still feels weak and generally achy. T-max in the last 24 hours was 100.7.  Appreciate ID consultation.  TTE was negative for vegetation however, TTE does not rule out valvular or lead vegetation.  Plan to treat as endocarditis with 6 weeks of IV abx followed by suppressive therapy.  He is on Remdesivir for 3 days, also on Vanc and Ampicillin.  His creatinine is 6.0 today.  He looks euvolemic on exam; will stop Bumex and monitor response.        Continuous Infusions:  Current Facility-Administered Medications   Medication Dose Route Frequency Last Rate Last Admin     Scheduled Meds:  Current Facility-Administered Medications   Medication Dose Route Frequency    amiodarone  400 mg Oral Daily    ampicillin IV (PEDS and ADULTS)  2 g Intravenous Q12H    famotidine  20 mg Oral Daily    hydrALAZINE  75 mg Oral Q8H    magnesium oxide  400 mg Oral Daily    ondansetron  4 mg Intravenous Once    remdesivir infusion  100 mg Intravenous Daily    sodium bicarbonate  325 mg Oral TID    traZODone  25 mg Oral QHS    vancomycin (VANCOCIN) IV (PEDS and ADULTS)  500 mg Intravenous Once    venlafaxine  37.5 mg Oral Daily    warfarin  2.5 mg Oral Once per day on Sunday Monday Wednesday Friday Saturday    And    warfarin  3.75 mg Oral Every Tues, Thurs     PRN Meds:  Current Facility-Administered Medications:     acetaminophen, 1,000 mg, Oral, Q8H PRN    dextrose 10%, 12.5 g, Intravenous, PRN    dextrose 10%, 25 g, Intravenous, PRN    glucagon (human recombinant), 1 mg, Intramuscular, PRN    glucose, 16 g, Oral, PRN    glucose, 24 g, Oral, PRN    insulin aspart U-100, 0-5 Units, Subcutaneous, QID (AC  + HS) PRN    senna-docusate 8.6-50 mg, 2 tablet, Oral, Daily PRN    Pharmacy to dose Vancomycin consult, , , Once **AND** vancomycin - pharmacy to dose, , Intravenous, pharmacy to manage frequency    Review of patient's allergies indicates:  No Known Allergies  Objective:     Vital Signs (Most Recent):  Temp: 98.2 °F (36.8 °C) (04/19/24 1102)  Pulse: 88 (04/19/24 1102)  Resp: 16 (04/19/24 1102)  BP: 91/72 (04/19/24 1110)  SpO2: 96 % (04/19/24 1102) Vital Signs (24h Range):  Temp:  [97.8 °F (36.6 °C)-98.7 °F (37.1 °C)] 98.2 °F (36.8 °C)  Pulse:  [66-93] 88  Resp:  [16-18] 16  SpO2:  [91 %-98 %] 96 %  BP: (66-98)/(0-77) 91/72     Patient Vitals for the past 72 hrs (Last 3 readings):   Weight   04/18/24 1240 75 kg (165 lb 5.5 oz)   04/18/24 0913 75.2 kg (165 lb 12.6 oz)   04/17/24 1200 76.7 kg (169 lb 1.5 oz)     Body mass index is 21.23 kg/m².      Intake/Output Summary (Last 24 hours) at 4/19/2024 1215  Last data filed at 4/19/2024 0921  Gross per 24 hour   Intake 964 ml   Output 400 ml   Net 564 ml       Hemodynamic Parameters:       Telemetry: SR with PVC's       Physical Exam  Constitutional:       Appearance: He is ill-appearing.   HENT:      Head: Normocephalic and atraumatic.   Eyes:      Conjunctiva/sclera: Conjunctivae normal.      Pupils: Pupils are equal, round, and reactive to light.   Neck:      Comments: Do not appreciate elevated JVP  Cardiovascular:      Rate and Rhythm: Normal rate and regular rhythm.      Comments: Smooth VAD hum  Pulmonary:      Effort: Pulmonary effort is normal.      Comments: Breath sounds are decreased right base. Lungs essentially CTA bilaterally  Abdominal:      General: Bowel sounds are normal.      Palpations: Abdomen is soft.   Musculoskeletal:         General: No swelling. Normal range of motion.      Cervical back: Normal range of motion and neck supple.   Skin:     General: Skin is warm and dry.      Capillary Refill: Capillary refill takes 2 to 3 seconds.    Neurological:      General: No focal deficit present.      Mental Status: He is alert and oriented to person, place, and time.            Significant Labs:  CBC:  Recent Labs   Lab 04/17/24 0409 04/18/24  0534 04/19/24  0530   WBC 6.92 11.26 8.74   RBC 3.34* 3.44* 3.41*   HGB 9.3* 9.7* 9.7*   HCT 29.6* 30.6* 29.5*    310 362   MCV 89 89 87   MCH 27.8 28.2 28.4   MCHC 31.4* 31.7* 32.9     BNP:  Recent Labs   Lab 04/17/24 0409 04/19/24  0530   * 409*     CMP:  Recent Labs   Lab 04/17/24  0030 04/17/24 0409 04/18/24  0534 04/19/24  0530   * 117* 138* 104   CALCIUM 8.9 9.5 9.1 9.2   ALBUMIN 3.0* 3.2*  --  3.0*   PROT 7.7 8.2  --  8.2   * 134* 135* 135*   K 4.0 3.9 4.0 3.6   CO2 23 25 25 24   CL 95 94* 93* 92*   BUN 76* 82* 86* 98*   CREATININE 4.3* 4.3* 5.0* 6.0*   ALKPHOS 127 143*  --  139*   ALT 88* 101*  --  516*   AST 76* 88*  --  644*   BILITOT 0.4 0.5  --  0.8      Coagulation:   Recent Labs   Lab 04/17/24 0409 04/18/24  0534 04/19/24  0530   INR 2.3* 1.9* 2.2*   APTT 43.6* 46.1* 45.3*     LDH:  Recent Labs   Lab 04/16/24 2323 04/17/24 0409 04/18/24  0534 04/19/24  0530   * 461* 729* 1,048*     Microbiology:  Microbiology Results (last 7 days)       Procedure Component Value Units Date/Time    Blood culture x two cultures. Draw prior to antibiotics. [[id number]]  (Abnormal) Collected: 04/16/24 2322    Order Status: Completed Specimen: Blood from Peripheral, Hand, Right Updated: 04/19/24 1106     Blood Culture, Routine Gram stain aer bottle: Gram positive cocci      Gram stain nata bottle: Gram positive cocci      Positive results previously called 04/17/2024 15:08      ENTEROCOCCUS FAECALIS  For susceptibility see order #[id number]      Narrative:      Aerobic and anaerobic    Blood culture [[id number]] Collected: 04/18/24 0535    Order Status: Completed Specimen: Blood Updated: 04/19/24 1013     Blood Culture, Routine No Growth to date      No Growth to date    Blood  culture [[id number]] Collected: 04/18/24 0534    Order Status: Completed Specimen: Blood Updated: 04/19/24 1013     Blood Culture, Routine No Growth to date      No Growth to date    Blood culture x two cultures. Draw prior to antibiotics. [[id number]]  (Abnormal) Collected: 04/16/24 2322    Order Status: Completed Specimen: Blood from Peripheral, Hand, Left Updated: 04/18/24 1445     Blood Culture, Routine Gram stain aer bottle: Gram positive cocci      Gram stain nata bottle: Gram positive cocci      Results called to and read back by: Rachael Horan RN 04/17/2024  15:08      ENTEROCOCCUS FAECALIS  Susceptibility pending      Narrative:      Aerobic and anaerobic    Rapid Organism ID by PCR (from Blood culture) [[id number]]  (Abnormal) Collected: 04/16/24 2322    Order Status: Completed Updated: 04/17/24 1726     Enterococcus faecalis Detected     Enterococcus faecium Not Detected     Listeria monocytogenes Not Detected     Staphylococcus spp. Not Detected     Staphylococcus aureus Not Detected     Staphylococcus epidermidis Not Detected     Staphylococcus lugdunensis Not Detected     Streptococcus species Not Detected     Streptococcus agalactiae Not Detected     Streptococcus pneumoniae Not Detected     Streptococcus pyogenes Not Detected     Acinetobacter calcoaceticus/baumannii complex Not Detected     Bacteroides fragilis Not Detected     Enterobacterales Not Detected     Enterobacter cloacae complex Not Detected     Escherichia coli Not Detected     Klebsiella aerogenes Not Detected     Klebsiella oxytoca Not Detected     Klebsiella pneumoniae group Not Detected     Proteus Not Detected     Salmonella sp Not Detected     Serratia marcescens Not Detected     Haemophilus influenzae Not Detected     Neisseria meningtidis Not Detected     Pseudomonas aeruginosa Not Detected     Stenotrophomonas maltophilia Not Detected     Candida albicans Not Detected     Candida auris Not Detected     Candida glabrata Not Detected      Rakel krusei Not Detected     Candida parapsilosis Not Detected     Candida tropicalis Not Detected     Cryptococcus neoformans/gattii Not Detected     CTX-M (ESBL ) Test Not Applicable     IMP (Carbapenem resistant) Test Not Applicable     KPC resistance gene (Carbapenem resistant) Test Not Applicable     mcr-1  Test Not Applicable     mec A/C  Test Not Applicable     mec A/C and MREJ (MRSA) gene Test Not Applicable     NDM (Carbapenem resistant) Test Not Applicable     OXA-48-like (Carbapenem resistant) Test Not Applicable     van A/B (VRE gene) Not Detected     VIM (Carbapenem resistant) Test Not Applicable    Narrative:      Aerobic and anaerobic    Influenza A & B by Molecular [[id number]] Collected: 04/16/24 0837    Order Status: Completed Specimen: Nasopharyngeal Swab Updated: 04/17/24 0059     Influenza A, Molecular Negative     Influenza B, Molecular Negative     Flu A & B Source Nasal swab            I have reviewed all pertinent labs within the past 24 hours.    Estimated Creatinine Clearance: 13.5 mL/min (A) (based on SCr of 6 mg/dL (H)).    Diagnostic Results:  I have reviewed and interpreted all pertinent imaging results/findings within the past 24 hours.  Assessment and Plan:     Mr. Radha Abbott is a very pleasant 63 yo male with stage D HFrEF, ICMP with previous admission for ADHF/CS. He did undergo HM3 placement placed by Dr Washington on 12/1/23. Lengthy post op course complicated by vasoplegia(requiring Giaprezza), debility, malnutrition/impaired swallowing, and renal failure requiring HD(since weaned off). Once medically stable, he was transferred to Rehab for further PT/OT/ST. Of note, pt left with permacath still in place. He has done really well in rehab and now has continued to progress well at home. He is on a very high dose of Bumex 4 mg twice daily. VAD speed is at 5000 rpm. No VAD alarms noted on interrogation occasional PI events . Today he presents to the ED for infectious  workup due to complaints of fevers of 101.5F associated with fatigue, generalized weakness, lower back pain and HA. He also reports a decline in appetite. Wife changes his driveline dressing every Saturday and has not noticed any purulance with dressing changes. Body aches/weakness started around 2am last night followed by fevers around 5pm. He has been taking tylenol which has helped brng down fevers but has not helped with HA. HA is 5/10 in intensity. He reports slight increase in body weight/ fluid retention yesterday for which he took a dose of Metolazone with a drop in weight by ~5lbs. Denies any SOB, LE swelling at this time.      2D Echo with CFD done on 3/26/2024  LVAD: There is a Heartmate III LVAD running at 5000 RPM. The aortic valve does not open. The ventricular septum is at midline. Inflow cannula well seated at the apex. Outflow graft not visualized.    Left Ventricle: The left ventricle is moderately dilated. Normal wall thickness. Global hypokinesis present. Septal motion is abnormal. There is severely reduced systolic function with a visually estimated ejection fraction of 5 - 10%. There is diastolic dysfunction but grade cannot be determined.    Right Ventricle: Mild right ventricular enlargement. Wall thickness is normal. Right ventricle wall motion  is normal. Systolic function is normal. Pacemaker lead present in the ventricle.    Left Atrium: Left atrium is severely dilated.    Right Atrium: Right atrium is moderately dilated.    Mitral Valve: The mitral valve is repaired with an Noble stitch. There is mild regurgitation.    IVC/SVC: Normal venous pressure at 3 mmHg.    * Pneumonia due to COVID-19 virus  -4/16/24 he presented to the ED for infectious workup due to complaints of fevers of 101.5F associated with fatigue, generalized weakness, lower back pain and HA that started this morning. No evidence of drive line infection. No history of line infection.   - COVID +ve  - CT chest/ abdomen /  pelvis 4/16 showed new RML opacity  - UA clean, blood cxs from 4/16 +ve for E faecalis  - Vanc and Zosyn started on admit  - Appreciate ID's help. 3 day course of Remdesivir started  -He is on Vanc anc Ampicillin  - TTE to R/O endocarditis negative,    - continue tylenol for fevers    Bacteremia  -blood cxs from 4/16 +ve for E faecalis   -ID following  -E faecalis is a known cause of endocarditis and AICD lead infections.   -TTE was negative for vegetation.  However TTE does not rule out valvular or lead vegetation. Will treat this as endocarditis with 6 weeks of IV antibiotics followed by suppressive therapy   -Currently on Ampicillin    LVAD (left ventricular assist device) present  -HeartMate 3 Implanted  12/1/2023  as DT  -Cont Coumadin, dosing by PharmD.  Goal INR 2.0-3.0 . Therapeutic today  -Antiplatelets Not on ASA due to low H/H on implant   -LDH is stable overall today. Will continue to monitor daily.  -Speed set at  5000     -Interrogation notable for no events  -ECHO 4/18/24 with speed at 5000 EF: 10-15%, LVEDD: 6.1cm, RV size normal function mod reduced   -RHC 1/22/24 with RA 13, PCWP 22, PA 50/25, CO 5.8, CI 3.0   -Not listed for OHTx, declined for OHTX due to comorbidities     Procedure: Device Interrogation Including analysis of device parameters  Current Settings: Ventricular Assist Device  Review of device function is stable/unstable stable        4/19/2024    12:10 PM 4/19/2024     8:05 AM 4/19/2024     5:00 AM 4/18/2024    10:49 PM 4/18/2024     7:56 PM 4/18/2024     4:55 PM 4/18/2024    12:42 PM   TXP LVAD INTERROGATIONS   Type HeartMate3 HeartMate3   HeartMate3 HeartMate3 HeartMate3   Flow 3.9 4.1 3.7 4.1 4.2 3.9 3.9   Speed 5000 5000 500 5000 5000 5000 5000   PI 5.2 4.9 5.7 5.2 3.2 5.6 5.8   Power ([place]) 3.5 3.4 3.5 3.4 3.4 3.3 3.5   LSL  4600   4600 4600    Pulsatility Intermittent pulse Intermittent pulse    Intermittent pulse Intermittent pulse         Hyponatremia  -POA  -Daily labs        Stage 4 chronic kidney disease  -sCr has bumped from 4.3 to 6.0 (baseline 3's - 4's).   -Holding Bumex for now and will monitor response    Chronic combined systolic and diastolic heart failure  -ICM  -Last 2D Echo  4/18/24 : LVEF 10-15%, LVEDD  6.1 cm  -Euvolemic on examination today  -Was on Bumex 4mg BID at home.  Will hold today given creatinine is up at 6.0  -GDMT with Hydralazine  -2g Na dietary restriction, 1500 mL fluid restriction, strict I/Os      Ventricular tachycardia  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    Type 2 diabetes mellitus without complication, without long-term current use of insulin  -SSI    Hypertension  Continue hydralazine    Headache  C/o of HA on admit with no improvement despite tylenol  - CTH done 4/16 with no acute processes        DENIZ Faye  Heart Transplant  [facility] - Cardiology Stepdown

## 2024-04-19 NOTE — SUBJECTIVE & OBJECTIVE
Interval History: Still feels weak and generally achy. T-max in the last 24 hours was 100.7.  Appreciate ID consultation.  TTE was negative for vegetation however, TTE does not rule out valvular or lead vegetation.  Plan to treat as endocarditis with 6 weeks of IV abx followed by suppressive therapy.  He is on Remdesivir for 3 days, also on Vanc and Ampicillin.  His creatinine is 6.0 today.  He looks euvolemic on exam; will stop Bumex and monitor response.        Continuous Infusions:  Current Facility-Administered Medications   Medication Dose Route Frequency Last Rate Last Admin     Scheduled Meds:  Current Facility-Administered Medications   Medication Dose Route Frequency    amiodarone  400 mg Oral Daily    ampicillin IV (PEDS and ADULTS)  2 g Intravenous Q12H    famotidine  20 mg Oral Daily    hydrALAZINE  75 mg Oral Q8H    magnesium oxide  400 mg Oral Daily    ondansetron  4 mg Intravenous Once    remdesivir infusion  100 mg Intravenous Daily    sodium bicarbonate  325 mg Oral TID    traZODone  25 mg Oral QHS    vancomycin (VANCOCIN) IV (PEDS and ADULTS)  500 mg Intravenous Once    venlafaxine  37.5 mg Oral Daily    warfarin  2.5 mg Oral Once per day on Sunday Monday Wednesday Friday Saturday    And    warfarin  3.75 mg Oral Every Tues, Thurs     PRN Meds:  Current Facility-Administered Medications:     acetaminophen, 1,000 mg, Oral, Q8H PRN    dextrose 10%, 12.5 g, Intravenous, PRN    dextrose 10%, 25 g, Intravenous, PRN    glucagon (human recombinant), 1 mg, Intramuscular, PRN    glucose, 16 g, Oral, PRN    glucose, 24 g, Oral, PRN    insulin aspart U-100, 0-5 Units, Subcutaneous, QID (AC + HS) PRN    senna-docusate 8.6-50 mg, 2 tablet, Oral, Daily PRN    Pharmacy to dose Vancomycin consult, , , Once **AND** vancomycin - pharmacy to dose, , Intravenous, pharmacy to manage frequency    Review of patient's allergies indicates:  No Known Allergies  Objective:     Vital Signs (Most Recent):  Temp: 98.2 °F (36.8  °C) (04/19/24 1102)  Pulse: 88 (04/19/24 1102)  Resp: 16 (04/19/24 1102)  BP: 91/72 (04/19/24 1110)  SpO2: 96 % (04/19/24 1102) Vital Signs (24h Range):  Temp:  [97.8 °F (36.6 °C)-98.7 °F (37.1 °C)] 98.2 °F (36.8 °C)  Pulse:  [66-93] 88  Resp:  [16-18] 16  SpO2:  [91 %-98 %] 96 %  BP: (66-98)/(0-77) 91/72     Patient Vitals for the past 72 hrs (Last 3 readings):   Weight   04/18/24 1240 75 kg (165 lb 5.5 oz)   04/18/24 0913 75.2 kg (165 lb 12.6 oz)   04/17/24 1200 76.7 kg (169 lb 1.5 oz)     Body mass index is 21.23 kg/m².      Intake/Output Summary (Last 24 hours) at 4/19/2024 1215  Last data filed at 4/19/2024 0921  Gross per 24 hour   Intake 964 ml   Output 400 ml   Net 564 ml       Hemodynamic Parameters:       Telemetry: SR with PVC's       Physical Exam  Constitutional:       Appearance: He is ill-appearing.   HENT:      Head: Normocephalic and atraumatic.   Eyes:      Conjunctiva/sclera: Conjunctivae normal.      Pupils: Pupils are equal, round, and reactive to light.   Neck:      Comments: Do not appreciate elevated JVP  Cardiovascular:      Rate and Rhythm: Normal rate and regular rhythm.      Comments: Smooth VAD hum  Pulmonary:      Effort: Pulmonary effort is normal.      Comments: Breath sounds are decreased right base. Lungs essentially CTA bilaterally  Abdominal:      General: Bowel sounds are normal.      Palpations: Abdomen is soft.   Musculoskeletal:         General: No swelling. Normal range of motion.      Cervical back: Normal range of motion and neck supple.   Skin:     General: Skin is warm and dry.      Capillary Refill: Capillary refill takes 2 to 3 seconds.   Neurological:      General: No focal deficit present.      Mental Status: He is alert and oriented to person, place, and time.            Significant Labs:  CBC:  Recent Labs   Lab 04/17/24  0409 04/18/24  0534 04/19/24  0530   WBC 6.92 11.26 8.74   RBC 3.34* 3.44* 3.41*   HGB 9.3* 9.7* 9.7*   HCT 29.6* 30.6* 29.5*    310 362    MCV 89 89 87   MCH 27.8 28.2 28.4   MCHC 31.4* 31.7* 32.9     BNP:  Recent Labs   Lab 04/17/24  0409 04/19/24  0530   * 409*     CMP:  Recent Labs   Lab 04/17/24  0030 04/17/24  0409 04/18/24  0534 04/19/24  0530   * 117* 138* 104   CALCIUM 8.9 9.5 9.1 9.2   ALBUMIN 3.0* 3.2*  --  3.0*   PROT 7.7 8.2  --  8.2   * 134* 135* 135*   K 4.0 3.9 4.0 3.6   CO2 23 25 25 24   CL 95 94* 93* 92*   BUN 76* 82* 86* 98*   CREATININE 4.3* 4.3* 5.0* 6.0*   ALKPHOS 127 143*  --  139*   ALT 88* 101*  --  516*   AST 76* 88*  --  644*   BILITOT 0.4 0.5  --  0.8      Coagulation:   Recent Labs   Lab 04/17/24  0409 04/18/24  0534 04/19/24  0530   INR 2.3* 1.9* 2.2*   APTT 43.6* 46.1* 45.3*     LDH:  Recent Labs   Lab 04/16/24 2323 04/17/24 0409 04/18/24  0534 04/19/24  0530   * 461* 729* 1,048*     Microbiology:  Microbiology Results (last 7 days)       Procedure Component Value Units Date/Time    Blood culture x two cultures. Draw prior to antibiotics. [[id number]]  (Abnormal) Collected: 04/16/24 2322    Order Status: Completed Specimen: Blood from Peripheral, Hand, Right Updated: 04/19/24 1106     Blood Culture, Routine Gram stain aer bottle: Gram positive cocci      Gram stain nata bottle: Gram positive cocci      Positive results previously called 04/17/2024 15:08      ENTEROCOCCUS FAECALIS  For susceptibility see order #[id number]      Narrative:      Aerobic and anaerobic    Blood culture [[id number]] Collected: 04/18/24 0535    Order Status: Completed Specimen: Blood Updated: 04/19/24 1013     Blood Culture, Routine No Growth to date      No Growth to date    Blood culture [[id number]] Collected: 04/18/24 0534    Order Status: Completed Specimen: Blood Updated: 04/19/24 1013     Blood Culture, Routine No Growth to date      No Growth to date    Blood culture x two cultures. Draw prior to antibiotics. [[id number]]  (Abnormal) Collected: 04/16/24 2322    Order Status: Completed Specimen: Blood from  Peripheral, Hand, Left Updated: 04/18/24 1445     Blood Culture, Routine Gram stain aer bottle: Gram positive cocci      Gram stain nata bottle: Gram positive cocci      Results called to and read back by: Rachael Horan RN 04/17/2024  15:08      ENTEROCOCCUS FAECALIS  Susceptibility pending      Narrative:      Aerobic and anaerobic    Rapid Organism ID by PCR (from Blood culture) [[id number]]  (Abnormal) Collected: 04/16/24 2322    Order Status: Completed Updated: 04/17/24 1726     Enterococcus faecalis Detected     Enterococcus faecium Not Detected     Listeria monocytogenes Not Detected     Staphylococcus spp. Not Detected     Staphylococcus aureus Not Detected     Staphylococcus epidermidis Not Detected     Staphylococcus lugdunensis Not Detected     Streptococcus species Not Detected     Streptococcus agalactiae Not Detected     Streptococcus pneumoniae Not Detected     Streptococcus pyogenes Not Detected     Acinetobacter calcoaceticus/baumannii complex Not Detected     Bacteroides fragilis Not Detected     Enterobacterales Not Detected     Enterobacter cloacae complex Not Detected     Escherichia coli Not Detected     Klebsiella aerogenes Not Detected     Klebsiella oxytoca Not Detected     Klebsiella pneumoniae group Not Detected     Proteus Not Detected     Salmonella sp Not Detected     Serratia marcescens Not Detected     Haemophilus influenzae Not Detected     Neisseria meningtidis Not Detected     Pseudomonas aeruginosa Not Detected     Stenotrophomonas maltophilia Not Detected     Candida albicans Not Detected     Candida auris Not Detected     Candida glabrata Not Detected     Candida krusei Not Detected     Candida parapsilosis Not Detected     Candida tropicalis Not Detected     Cryptococcus neoformans/gattii Not Detected     CTX-M (ESBL ) Test Not Applicable     IMP (Carbapenem resistant) Test Not Applicable     KPC resistance gene (Carbapenem resistant) Test Not Applicable     mcr-1  Test Not  Applicable     mec A/C  Test Not Applicable     mec A/C and MREJ (MRSA) gene Test Not Applicable     NDM (Carbapenem resistant) Test Not Applicable     OXA-48-like (Carbapenem resistant) Test Not Applicable     van A/B (VRE gene) Not Detected     VIM (Carbapenem resistant) Test Not Applicable    Narrative:      Aerobic and anaerobic    Influenza A & B by Molecular [[id number]] Collected: 04/16/24 2347    Order Status: Completed Specimen: Nasopharyngeal Swab Updated: 04/17/24 0059     Influenza A, Molecular Negative     Influenza B, Molecular Negative     Flu A & B Source Nasal swab            I have reviewed all pertinent labs within the past 24 hours.    Estimated Creatinine Clearance: 13.5 mL/min (A) (based on SCr of 6 mg/dL (H)).    Diagnostic Results:  I have reviewed and interpreted all pertinent imaging results/findings within the past 24 hours.

## 2024-04-19 NOTE — PROGRESS NOTES
Pharmacokinetic Assessment Follow Up: IV Vancomycin    Vancomycin serum concentration assessment(s):    Am level = 19 (goal 15-20 for enterococcus bacteremia). Redose vancomycin 500mg once and repeat random level on 4/20 @ 10:00. Remains in IVÁN on CKD.     Drug levels (last 3 results):  Recent Labs   Lab Result Units 04/18/24  0534 04/19/24  0530   Vancomycin, Random ug/mL 23.1 19.0

## 2024-04-19 NOTE — PT/OT/SLP PROGRESS
Physical Therapy Co-Treatment    Patient Name:  Radha Abbott   MRN:  [id number]  Admitting Diagnosis:  Pneumonia due to COVID-19 virus   Recent Surgery: * No surgery found *    Admit Date: 4/16/2024  Length of Stay: 3 days    Recommendations:     Discharge Recommendations:   Low Intensity Therapy    Discharge Equipment Recommendations: walker, rolling   Barriers to discharge: None    Appropriate transfer level with nursing staff: Safe to transfer to bedside chair/bedside commode: stand pivot with 1 person with RW.    Plan:     During this hospitalization, patient to be seen 4 x/week to address the identified rehab impairments via gait training, therapeutic activities, therapeutic exercises, neuromuscular re-education and progress towards the established goals.  Plan of Care Expires:  05/17/24  Plan of Care Reviewed with: patient, spouse    Assessment:     Radha Abbott is a 64 y.o. male admitted with a medical diagnosis of Pneumonia due to COVID-19 virus. Pt found supine agreeable to therapy session. Pt demo'd improvements as he increased distance gait trained, but pt remains limited by weakness and decreased endurance at this time. Patient would benefit from skilled therapy services to maximize safety and independence, increase activity tolerance, decrease fall risk, decrease caregiver burden, improve QOL, improve patient's functional mobility, and decrease risk of contractures and pressure sores.  Patient continues to demonstrate the need for low intensity therapy on a scheduled basis exhibited by decreased independence with functional mobility     Problem List: weakness, impaired endurance, impaired self care skills, impaired functional mobility, gait instability, impaired balance, decreased lower extremity function, impaired cardiopulmonary response to activity.  Rehab Prognosis: Good; patient would benefit from acute skilled PT services to address these deficits and reach maximum level of function.      Goals:  "  Multidisciplinary Problems       Physical Therapy Goals          Problem: Physical Therapy    Goal Priority Disciplines Outcome Goal Variances Interventions   Physical Therapy Goal     PT, PT/OT Ongoing, Progressing     Description: Goals to be met by: 24     Patient will increase functional independence with mobility by performin. Supine to sit with Modified Luna  2. Sit to stand transfer with Modified Luna  3. Bed to chair transfer with Modified Luna using LRAD  4. Gait  x 150 feet with Supervision using LRAD.   5. Lower extremity exercise program x15 reps per handout, with independence                         Subjective     RN notified prior to session. Wife present upon PT entrance into room. Patient agreeable to PT treatment session.    Chief Complaint: "I dont feel like getting up but ill do it"  Patient/Family Comments/goals: feel better  Pain/Comfort:  Pain Rating 1: 0/10  Pain Rating Post-Intervention 1: 0/10      Objective:     Additional staff present: OT; OT for cotx due to pt's multiple decreased activity tolerance and medical comorbidities and functional deficits req'ing two skilled therapists to appropriately maximize functional potential by progressing pt's musculoskeletal strength and endurance, facilitating neuromuscular postural balance and control ,and accommodating for patient's impaired cardiopulmonary tolerance to activities.     Patient found up in chair with: peripheral IV, telemetry, LVAD   Cognition:   Alert and Cooperative  Patient is oriented to Person, Place, Time, Situation  General Precautions: Standard, Cardiac fall, droplet, contact, airborne, LVAD   Orthopedic Precautions:N/A   Braces: N/A   Body mass index is 21.31 kg/m².  Oxygen Device: Room Air  Vitals: BP 91/72 (BP Location: Left arm, Patient Position: Lying)   Pulse 88   Temp 98.2 °F (36.8 °C) (Oral)   Resp 16   Ht 6' 2" (1.88 m)   Wt 75.3 kg (166 lb 0.1 oz)   SpO2 96%   BMI 21.31 " kg/m²     Outcome Measures:  AM-PAC 6 CLICK MOBILITY  Turning over in bed (including adjusting bedclothes, sheets and blankets)?: 3  Sitting down on and standing up from a chair with arms (e.g., wheelchair, bedside commode, etc.): 3  Moving from lying on back to sitting on the side of the bed?: 3  Moving to and from a bed to a chair (including a wheelchair)?: 3  Need to walk in hospital room?: 3  Climbing 3-5 steps with a railing?: 3  Basic Mobility Total Score: 18     Functional Mobility:    Bed Mobility:   Scooting with stand by assistance  Supine > Sit with minimum assistance    Transfers:   Sit <> Stand Transfer: contact guard assistance with rolling walker     Balance:  Sitting Balance  Static: supervision  Dynamic: supervision  Standing:  Static: stand by assistance  Dynamic: contact guard assistance      Gait:  Patient ambulated: 25'   Patient required: contact guard  Patient used:  rolling walker   Gait Deviation(s): steady gait, decreased step length, flexed posture, and decreased gina  all lines remained intact throughout ambulation trial  Comments: Patient slow but steady throughout trial    Education:  Time provided for education, counseling and discussion of health disposition in regards to patient's current status  All questions answered within PT scope of practice and to patient's satisfaction  PT role in POC to address current functional deficits    Patient left up in chair with all lines intact, call button in reach, and wife present.    Time Tracking:     PT Received On: 04/19/24  PT Start Time: 1335     PT Stop Time: 1347  PT Total Time (min): 12 min     Billable Minutes:   Gait Training 10 minutes       PT/PTA: PT       4/19/2024

## 2024-04-19 NOTE — PROGRESS NOTES
Patient was seen today in the hospital. Patient awake at time of visit. Patient's spouse at bedside. Patient's equipment inspected and is in working condition. Driveline inspected, no kinks or tears seen. Equipment care and cleaning reviewed with patient. Patient has no equipment needs at this time.

## 2024-04-20 LAB
ANION GAP SERPL CALC-SCNC: 21 MMOL/L (ref 8–16)
APTT PPP: 49.8 SEC (ref 21–32)
BASOPHILS # BLD AUTO: 0.01 K/UL (ref 0–0.2)
BASOPHILS NFR BLD: 0.1 % (ref 0–1.9)
BUN SERPL-MCNC: 109 MG/DL (ref 8–23)
CALCIUM SERPL-MCNC: 8.9 MG/DL (ref 8.7–10.5)
CHLORIDE SERPL-SCNC: 92 MMOL/L (ref 95–110)
CO2 SERPL-SCNC: 21 MMOL/L (ref 23–29)
CREAT SERPL-MCNC: 6.8 MG/DL (ref 0.5–1.4)
DIFFERENTIAL METHOD BLD: ABNORMAL
EOSINOPHIL # BLD AUTO: 0 K/UL (ref 0–0.5)
EOSINOPHIL NFR BLD: 0 % (ref 0–8)
ERYTHROCYTE [DISTWIDTH] IN BLOOD BY AUTOMATED COUNT: 16.8 % (ref 11.5–14.5)
EST. GFR  (NO RACE VARIABLE): 8.5 ML/MIN/1.73 M^2
GLUCOSE SERPL-MCNC: 113 MG/DL (ref 70–110)
HCT VFR BLD AUTO: 27.3 % (ref 40–54)
HGB BLD-MCNC: 8.9 G/DL (ref 14–18)
IMM GRANULOCYTES # BLD AUTO: 0.04 K/UL (ref 0–0.04)
IMM GRANULOCYTES NFR BLD AUTO: 0.5 % (ref 0–0.5)
INR PPP: 2.6 (ref 0.8–1.2)
LDH SERPL L TO P-CCNC: 591 U/L (ref 110–260)
LYMPHOCYTES # BLD AUTO: 0.3 K/UL (ref 1–4.8)
LYMPHOCYTES NFR BLD: 3.5 % (ref 18–48)
MAGNESIUM SERPL-MCNC: 2.1 MG/DL (ref 1.6–2.6)
MCH RBC QN AUTO: 28 PG (ref 27–31)
MCHC RBC AUTO-ENTMCNC: 32.6 G/DL (ref 32–36)
MCV RBC AUTO: 86 FL (ref 82–98)
MONOCYTES # BLD AUTO: 0.5 K/UL (ref 0.3–1)
MONOCYTES NFR BLD: 5.9 % (ref 4–15)
NEUTROPHILS # BLD AUTO: 7.7 K/UL (ref 1.8–7.7)
NEUTROPHILS NFR BLD: 90 % (ref 38–73)
NRBC BLD-RTO: 0 /100 WBC
PHOSPHATE SERPL-MCNC: 6.5 MG/DL (ref 2.7–4.5)
PLATELET # BLD AUTO: 333 K/UL (ref 150–450)
PMV BLD AUTO: 10 FL (ref 9.2–12.9)
POCT GLUCOSE: 112 MG/DL (ref 70–110)
POCT GLUCOSE: 138 MG/DL (ref 70–110)
POCT GLUCOSE: 145 MG/DL (ref 70–110)
POCT GLUCOSE: 179 MG/DL (ref 70–110)
POTASSIUM SERPL-SCNC: 3.7 MMOL/L (ref 3.5–5.1)
PROTHROMBIN TIME: 27.2 SEC (ref 9–12.5)
RBC # BLD AUTO: 3.18 M/UL (ref 4.6–6.2)
SODIUM SERPL-SCNC: 134 MMOL/L (ref 136–145)
WBC # BLD AUTO: 8.54 K/UL (ref 3.9–12.7)

## 2024-04-20 PROCEDURE — 63600175 PHARM REV CODE 636 W HCPCS: Mod: JZ,TB | Performed by: INTERNAL MEDICINE

## 2024-04-20 PROCEDURE — 27000248 HC VAD-ADDITIONAL DAY

## 2024-04-20 PROCEDURE — 99233 SBSQ HOSP IP/OBS HIGH 50: CPT | Mod: ,,, | Performed by: INTERNAL MEDICINE

## 2024-04-20 PROCEDURE — 25000003 PHARM REV CODE 250: Performed by: INTERNAL MEDICINE

## 2024-04-20 PROCEDURE — 25000003 PHARM REV CODE 250: Performed by: PHYSICIAN ASSISTANT

## 2024-04-20 PROCEDURE — 87040 BLOOD CULTURE FOR BACTERIA: CPT | Mod: 59 | Performed by: INTERNAL MEDICINE

## 2024-04-20 PROCEDURE — 93750 INTERROGATION VAD IN PERSON: CPT | Mod: ,,, | Performed by: INTERNAL MEDICINE

## 2024-04-20 PROCEDURE — 83615 LACTATE (LD) (LDH) ENZYME: CPT | Performed by: STUDENT IN AN ORGANIZED HEALTH CARE EDUCATION/TRAINING PROGRAM

## 2024-04-20 PROCEDURE — 85730 THROMBOPLASTIN TIME PARTIAL: CPT | Performed by: STUDENT IN AN ORGANIZED HEALTH CARE EDUCATION/TRAINING PROGRAM

## 2024-04-20 PROCEDURE — 85025 COMPLETE CBC W/AUTO DIFF WBC: CPT | Performed by: STUDENT IN AN ORGANIZED HEALTH CARE EDUCATION/TRAINING PROGRAM

## 2024-04-20 PROCEDURE — 80048 BASIC METABOLIC PNL TOTAL CA: CPT | Performed by: STUDENT IN AN ORGANIZED HEALTH CARE EDUCATION/TRAINING PROGRAM

## 2024-04-20 PROCEDURE — 99233 SBSQ HOSP IP/OBS HIGH 50: CPT | Mod: ,,, | Performed by: PHYSICIAN ASSISTANT

## 2024-04-20 PROCEDURE — 36415 COLL VENOUS BLD VENIPUNCTURE: CPT | Performed by: INTERNAL MEDICINE

## 2024-04-20 PROCEDURE — 84100 ASSAY OF PHOSPHORUS: CPT | Performed by: STUDENT IN AN ORGANIZED HEALTH CARE EDUCATION/TRAINING PROGRAM

## 2024-04-20 PROCEDURE — 27000207 HC ISOLATION

## 2024-04-20 PROCEDURE — 83735 ASSAY OF MAGNESIUM: CPT | Performed by: STUDENT IN AN ORGANIZED HEALTH CARE EDUCATION/TRAINING PROGRAM

## 2024-04-20 PROCEDURE — 25000003 PHARM REV CODE 250: Performed by: STUDENT IN AN ORGANIZED HEALTH CARE EDUCATION/TRAINING PROGRAM

## 2024-04-20 PROCEDURE — 20600001 HC STEP DOWN PRIVATE ROOM

## 2024-04-20 PROCEDURE — 85610 PROTHROMBIN TIME: CPT | Performed by: STUDENT IN AN ORGANIZED HEALTH CARE EDUCATION/TRAINING PROGRAM

## 2024-04-20 RX ORDER — SODIUM CHLORIDE 9 MG/ML
INJECTION, SOLUTION INTRAVENOUS CONTINUOUS
Status: ACTIVE | OUTPATIENT
Start: 2024-04-20 | End: 2024-04-20

## 2024-04-20 RX ADMIN — Medication 400 MG: at 08:04

## 2024-04-20 RX ADMIN — HYDRALAZINE HYDROCHLORIDE 75 MG: 50 TABLET ORAL at 09:04

## 2024-04-20 RX ADMIN — AMPICILLIN 2 G: 2 INJECTION, POWDER, FOR SOLUTION INTRAMUSCULAR; INTRAVENOUS at 09:04

## 2024-04-20 RX ADMIN — HYDRALAZINE HYDROCHLORIDE 75 MG: 50 TABLET ORAL at 02:04

## 2024-04-20 RX ADMIN — SODIUM BICARBONATE 325 MG: 325 TABLET ORAL at 02:04

## 2024-04-20 RX ADMIN — TRAZODONE HYDROCHLORIDE 25 MG: 50 TABLET ORAL at 09:04

## 2024-04-20 RX ADMIN — AMPICILLIN 2 G: 2 INJECTION, POWDER, FOR SOLUTION INTRAMUSCULAR; INTRAVENOUS at 08:04

## 2024-04-20 RX ADMIN — HYDRALAZINE HYDROCHLORIDE 75 MG: 50 TABLET ORAL at 04:04

## 2024-04-20 RX ADMIN — ACETAMINOPHEN 1000 MG: 500 TABLET ORAL at 07:04

## 2024-04-20 RX ADMIN — SODIUM BICARBONATE 325 MG: 325 TABLET ORAL at 09:04

## 2024-04-20 RX ADMIN — WARFARIN SODIUM 2.5 MG: 2.5 TABLET ORAL at 04:04

## 2024-04-20 RX ADMIN — AMIODARONE HYDROCHLORIDE 400 MG: 200 TABLET ORAL at 08:04

## 2024-04-20 RX ADMIN — SODIUM BICARBONATE 325 MG: 325 TABLET ORAL at 08:04

## 2024-04-20 RX ADMIN — REMDESIVIR 100 MG: 100 INJECTION, POWDER, LYOPHILIZED, FOR SOLUTION INTRAVENOUS at 09:04

## 2024-04-20 RX ADMIN — VENLAFAXINE 37.5 MG: 37.5 TABLET ORAL at 09:04

## 2024-04-20 RX ADMIN — CEFTRIAXONE 2 G: 2 INJECTION, POWDER, FOR SOLUTION INTRAMUSCULAR; INTRAVENOUS at 07:04

## 2024-04-20 RX ADMIN — FAMOTIDINE 20 MG: 20 TABLET ORAL at 08:04

## 2024-04-20 RX ADMIN — CEFTRIAXONE 2 G: 2 INJECTION, POWDER, FOR SOLUTION INTRAMUSCULAR; INTRAVENOUS at 08:04

## 2024-04-20 RX ADMIN — SODIUM CHLORIDE: 9 INJECTION, SOLUTION INTRAVENOUS at 10:04

## 2024-04-20 NOTE — SUBJECTIVE & OBJECTIVE
Interval History: Feeling better today.    Review of Systems   All other systems reviewed and are negative.    Objective:     Vital Signs (Most Recent):  Temp: 98.5 °F (36.9 °C) (04/20/24 0740)  Pulse: 75 (04/20/24 1508)  Resp: 18 (04/20/24 0740)  BP: (!) 86/0 (04/20/24 1204)  SpO2: 97 % (04/20/24 0740) Vital Signs (24h Range):  Temp:  [97.7 °F (36.5 °C)-98.5 °F (36.9 °C)] 98.5 °F (36.9 °C)  Pulse:  [75-92] 75  Resp:  [18-20] 18  SpO2:  [95 %-98 %] 97 %  BP: ()/(0-79) 86/0     Weight: 75.3 kg (166 lb 0.1 oz)  Body mass index is 21.31 kg/m².    Estimated Creatinine Clearance: 12 mL/min (A) (based on SCr of 6.8 mg/dL (H)).     Physical Exam  Vitals and nursing note reviewed.   Constitutional:       General: He is not in acute distress.     Appearance: He is well-developed. He is not diaphoretic.   HENT:      Head: Normocephalic and atraumatic.      Right Ear: External ear normal.      Left Ear: External ear normal.      Nose: Nose normal.      Mouth/Throat:      Pharynx: No oropharyngeal exudate.   Eyes:      General: No scleral icterus.        Right eye: No discharge.         Left eye: No discharge.      Conjunctiva/sclera: Conjunctivae normal.      Pupils: Pupils are equal, round, and reactive to light.   Neck:      Thyroid: No thyromegaly.      Vascular: No JVD.      Trachea: No tracheal deviation.   Cardiovascular:      Comments: LVAD hum  Pulmonary:      Effort: Pulmonary effort is normal. No respiratory distress.      Breath sounds: Normal breath sounds. No stridor. No wheezing or rales.   Chest:      Chest wall: No tenderness.   Abdominal:      General: Bowel sounds are normal. There is no distension.      Palpations: Abdomen is soft. There is no mass.      Tenderness: There is no abdominal tenderness. There is no guarding or rebound.      Comments: LVAD drive line present.   Musculoskeletal:         General: No tenderness. Normal range of motion.      Cervical back: Normal range of motion and neck  supple.   Lymphadenopathy:      Cervical: No cervical adenopathy.   Skin:     General: Skin is warm.      Coloration: Skin is not pale.      Findings: No erythema or rash.   Neurological:      Mental Status: He is alert and oriented to person, place, and time.      Cranial Nerves: No cranial nerve deficit.      Motor: No abnormal muscle tone.      Coordination: Coordination normal.      Deep Tendon Reflexes: Reflexes normal.          Significant Labs:   Microbiology Results (last 7 days)       Procedure Component Value Units Date/Time    Blood culture [[id number]] Collected: 04/20/24 0234    Order Status: Completed Specimen: Blood Updated: 04/20/24 1115     Blood Culture, Routine No Growth to date    Blood culture [[id number]] Collected: 04/20/24 0240    Order Status: Completed Specimen: Blood Updated: 04/20/24 1115     Blood Culture, Routine No Growth to date    Blood culture [[id number]] Collected: 04/18/24 0535    Order Status: Completed Specimen: Blood Updated: 04/20/24 1012     Blood Culture, Routine No Growth to date      No Growth to date      No Growth to date    Blood culture [[id number]] Collected: 04/18/24 0534    Order Status: Completed Specimen: Blood Updated: 04/20/24 1012     Blood Culture, Routine No Growth to date      No Growth to date      No Growth to date    Blood culture x two cultures. Draw prior to antibiotics. [[id number]]  (Abnormal) Collected: 04/16/24 2322    Order Status: Completed Specimen: Blood from Peripheral, Hand, Right Updated: 04/19/24 1501     Blood Culture, Routine Gram stain aer bottle: Gram positive cocci      Gram stain nata bottle: Gram positive cocci      Positive results previously called 04/17/2024 15:08      ENTEROCOCCUS FAECALIS  For susceptibility see order #[id number]      Narrative:      Aerobic and anaerobic    Blood culture x two cultures. Draw prior to antibiotics. [[id number]]  (Abnormal)  (Susceptibility) Collected: 04/16/24 2322    Order Status: Completed  Specimen: Blood from Peripheral, Hand, Left Updated: 04/19/24 1500     Blood Culture, Routine Gram stain aer bottle: Gram positive cocci      Gram stain nata bottle: Gram positive cocci      Results called to and read back by: Rachael Horan RN 04/17/2024  15:08      ENTEROCOCCUS FAECALIS    Narrative:      Aerobic and anaerobic    Rapid Organism ID by PCR (from Blood culture) [[id number]]  (Abnormal) Collected: 04/16/24 2322    Order Status: Completed Updated: 04/17/24 1726     Enterococcus faecalis Detected     Enterococcus faecium Not Detected     Listeria monocytogenes Not Detected     Staphylococcus spp. Not Detected     Staphylococcus aureus Not Detected     Staphylococcus epidermidis Not Detected     Staphylococcus lugdunensis Not Detected     Streptococcus species Not Detected     Streptococcus agalactiae Not Detected     Streptococcus pneumoniae Not Detected     Streptococcus pyogenes Not Detected     Acinetobacter calcoaceticus/baumannii complex Not Detected     Bacteroides fragilis Not Detected     Enterobacterales Not Detected     Enterobacter cloacae complex Not Detected     Escherichia coli Not Detected     Klebsiella aerogenes Not Detected     Klebsiella oxytoca Not Detected     Klebsiella pneumoniae group Not Detected     Proteus Not Detected     Salmonella sp Not Detected     Serratia marcescens Not Detected     Haemophilus influenzae Not Detected     Neisseria meningtidis Not Detected     Pseudomonas aeruginosa Not Detected     Stenotrophomonas maltophilia Not Detected     Candida albicans Not Detected     Candida auris Not Detected     Candida glabrata Not Detected     Candida krusei Not Detected     Candida parapsilosis Not Detected     Candida tropicalis Not Detected     Cryptococcus neoformans/gattii Not Detected     CTX-M (ESBL ) Test Not Applicable     IMP (Carbapenem resistant) Test Not Applicable     KPC resistance gene (Carbapenem resistant) Test Not Applicable     mcr-1  Test Not  Applicable     mec A/C  Test Not Applicable     mec A/C and MREJ (MRSA) gene Test Not Applicable     NDM (Carbapenem resistant) Test Not Applicable     OXA-48-like (Carbapenem resistant) Test Not Applicable     van A/B (VRE gene) Not Detected     VIM (Carbapenem resistant) Test Not Applicable    Narrative:      Aerobic and anaerobic    Influenza A & B by Molecular [[id number]] Collected: 04/16/24 2347    Order Status: Completed Specimen: Nasopharyngeal Swab Updated: 04/17/24 0059     Influenza A, Molecular Negative     Influenza B, Molecular Negative     Flu A & B Source Nasal swab            Significant Imaging: I have reviewed all pertinent imaging results/findings within the past 24 hours.

## 2024-04-20 NOTE — PLAN OF CARE
Problem: Adult Inpatient Plan of Care  Goal: Plan of Care Review  Outcome: Ongoing, Progressing     Problem: Adult Inpatient Plan of Care  Goal: Absence of Hospital-Acquired Illness or Injury  Outcome: Ongoing, Progressing     Problem: Diabetes Comorbidity  Goal: Blood Glucose Level Within Targeted Range  Outcome: Ongoing, Progressing     Problem: Infection Progression (Sepsis/Septic Shock)  Goal: Absence of Infection Signs and Symptoms  Outcome: Ongoing, Progressing     Problem: Skin Injury Risk Increased  Goal: Skin Health and Integrity  Outcome: Ongoing, Progressing     Problem: Fall Injury Risk  Goal: Absence of Fall and Fall-Related Injury  Outcome: Ongoing, Progressing     Patient is AAOX4, VSS, NAD.  NS is infusing at 50ml/h. Plan of care reviewed and explained. Patient and spouse verbalized understanding. Fall and isolation precautions maintained. Bed in low and locked position, Side rails up x2, call light within reach.

## 2024-04-20 NOTE — PROGRESS NOTES
[facility] - [facility] Step[facility]  Heart Transplant  Progress Note    Patient Name: Radha Abbott  MRN: [id number]  Admission Date: 4/16/2024  Hospital Length of Stay: 4 days  Attending Physician: Brayan Ocampo MD  Primary Care Provider: Vasu Kong MD  Principal Problem:Pneumonia due to COVID-19 virus    Subjective:   Interval History:   Patient looks much better today and reports he is feeling better.  He has been afebrile the last 24 hours.  We held his Bumex in the setting of worsening renal function.  His creatinine is up again today 6.8 from 6.0 yesterday.  He does not look volume overloaded on exam.  Will try normal saline 50cc/hr for 10 hours and monitor response.  If no improvement tomorrow then will consider Nephrology consult.  Appreciate ID recommendations.  He is on Remdesivir for total of 3 days.  Continued Ampicillin and Ceftriaxone.  Repeat blood cultures ordered this am.      Continuous Infusions:  Current Facility-Administered Medications   Medication Dose Route Frequency Last Rate Last Admin    sodium chloride 0.9%   Intravenous Continuous         Scheduled Meds:  Current Facility-Administered Medications   Medication Dose Route Frequency    amiodarone  400 mg Oral Daily    ampicillin IV (PEDS and ADULTS)  2 g Intravenous Q12H    cefTRIAXone (Rocephin) IV (PEDS and ADULTS)  2 g Intravenous Q12H    famotidine  20 mg Oral Daily    hydrALAZINE  75 mg Oral Q8H    magnesium oxide  400 mg Oral Daily    ondansetron  4 mg Intravenous Once    remdesivir infusion  100 mg Intravenous Daily    sodium bicarbonate  325 mg Oral TID    traZODone  25 mg Oral QHS    venlafaxine  37.5 mg Oral Daily    warfarin  2.5 mg Oral Once per day on Sunday Monday Wednesday Friday Saturday    And    warfarin  3.75 mg Oral Every Tues, Thurs     PRN Meds:  Current Facility-Administered Medications:     acetaminophen, 1,000 mg, Oral, Q8H PRN    dextrose 10%, 12.5 g, Intravenous, PRN    dextrose 10%, 25 g, Intravenous, PRN     glucagon (human recombinant), 1 mg, Intramuscular, PRN    glucose, 16 g, Oral, PRN    glucose, 24 g, Oral, PRN    insulin aspart U-100, 0-5 Units, Subcutaneous, QID (AC + HS) PRN    senna-docusate 8.6-50 mg, 2 tablet, Oral, Daily PRN    Review of patient's allergies indicates:  No Known Allergies  Objective:     Vital Signs (Most Recent):  Temp: 98.5 °F (36.9 °C) (04/20/24 0740)  Pulse: 84 (04/20/24 0740)  Resp: 18 (04/20/24 0740)  BP: (!) 90/0 (04/20/24 0826)  SpO2: 97 % (04/20/24 0740) Vital Signs (24h Range):  Temp:  [97.7 °F (36.5 °C)-98.5 °F (36.9 °C)] 98.5 °F (36.9 °C)  Pulse:  [81-94] 84  Resp:  [16-20] 18  SpO2:  [95 %-98 %] 97 %  BP: ()/(0-79) 90/0     Patient Vitals for the past 72 hrs (Last 3 readings):   Weight   04/19/24 1102 75.3 kg (166 lb 0.1 oz)   04/18/24 1240 75 kg (165 lb 5.5 oz)   04/18/24 0913 75.2 kg (165 lb 12.6 oz)     Body mass index is 21.31 kg/m².      Intake/Output Summary (Last 24 hours) at 4/20/2024 1007  Last data filed at 4/20/2024 0500  Gross per 24 hour   Intake 402 ml   Output 300 ml   Net 102 ml       Hemodynamic Parameters:       Telemetry: SR with PVC's       Physical Exam  Constitutional:       Appearance: He is ill-appearing.   HENT:      Head: Normocephalic and atraumatic.   Eyes:      Conjunctiva/sclera: Conjunctivae normal.      Pupils: Pupils are equal, round, and reactive to light.   Neck:      Comments: Do not appreciate elevated JVP  Cardiovascular:      Rate and Rhythm: Normal rate and regular rhythm.      Comments: Smooth VAD hum  Pulmonary:      Effort: Pulmonary effort is normal.      Comments: Breath sounds are decreased right base. Lungs essentially CTA bilaterally  Abdominal:      General: Bowel sounds are normal.      Palpations: Abdomen is soft.   Musculoskeletal:         General: No swelling. Normal range of motion.      Cervical back: Normal range of motion and neck supple.   Skin:     General: Skin is warm and dry.      Capillary Refill: Capillary  refill takes 2 to 3 seconds.   Neurological:      General: No focal deficit present.      Mental Status: He is alert and oriented to person, place, and time.            Significant Labs:  CBC:  Recent Labs   Lab 04/18/24 0534 04/19/24 0530 04/20/24  0234   WBC 11.26 8.74 8.54   RBC 3.44* 3.41* 3.18*   HGB 9.7* 9.7* 8.9*   HCT 30.6* 29.5* 27.3*    362 333   MCV 89 87 86   MCH 28.2 28.4 28.0   MCHC 31.7* 32.9 32.6     BNP:  Recent Labs   Lab 04/17/24 0409 04/19/24  0530   * 409*     CMP:  Recent Labs   Lab 04/17/24  0030 04/17/24 0409 04/18/24 0534 04/19/24 0530 04/20/24  0234   * 117* 138* 104 113*   CALCIUM 8.9 9.5 9.1 9.2 8.9   ALBUMIN 3.0* 3.2*  --  3.0*  --    PROT 7.7 8.2  --  8.2  --    * 134* 135* 135* 134*   K 4.0 3.9 4.0 3.6 3.7   CO2 23 25 25 24 21*   CL 95 94* 93* 92* 92*   BUN 76* 82* 86* 98* 109*   CREATININE 4.3* 4.3* 5.0* 6.0* 6.8*   ALKPHOS 127 143*  --  139*  --    ALT 88* 101*  --  516*  --    AST 76* 88*  --  644*  --    BILITOT 0.4 0.5  --  0.8  --       Coagulation:   Recent Labs   Lab 04/18/24 0534 04/19/24 0530 04/20/24  0234   INR 1.9* 2.2* 2.6*   APTT 46.1* 45.3* 49.8*     LDH:  Recent Labs   Lab 04/18/24 0534 04/19/24 0530 04/20/24  0234   * 1,048* 591*     Microbiology:  Microbiology Results (last 7 days)       Procedure Component Value Units Date/Time    Blood culture [[id number]] Collected: 04/20/24 0234    Order Status: Sent Specimen: Blood Updated: 04/20/24 0342    Blood culture [[id number]] Collected: 04/20/24 0240    Order Status: Sent Specimen: Blood Updated: 04/20/24 0342    Blood culture x two cultures. Draw prior to antibiotics. [[id number]]  (Abnormal) Collected: 04/16/24 2322    Order Status: Completed Specimen: Blood from Peripheral, Hand, Right Updated: 04/19/24 1501     Blood Culture, Routine Gram stain aer bottle: Gram positive cocci      Gram stain nata bottle: Gram positive cocci      Positive results previously called 04/17/2024  15:08      ENTEROCOCCUS FAECALIS  For susceptibility see order #[id number]      Narrative:      Aerobic and anaerobic    Blood culture x two cultures. Draw prior to antibiotics. [[id number]]  (Abnormal)  (Susceptibility) Collected: 04/16/24 2322    Order Status: Completed Specimen: Blood from Peripheral, Hand, Left Updated: 04/19/24 1500     Blood Culture, Routine Gram stain aer bottle: Gram positive cocci      Gram stain nata bottle: Gram positive cocci      Results called to and read back by: Rachael Horan RN 04/17/2024  15:08      ENTEROCOCCUS FAECALIS    Narrative:      Aerobic and anaerobic    Blood culture [[id number]] Collected: 04/18/24 0535    Order Status: Completed Specimen: Blood Updated: 04/19/24 1013     Blood Culture, Routine No Growth to date      No Growth to date    Blood culture [[id number]] Collected: 04/18/24 0534    Order Status: Completed Specimen: Blood Updated: 04/19/24 1013     Blood Culture, Routine No Growth to date      No Growth to date    Rapid Organism ID by PCR (from Blood culture) [[id number]]  (Abnormal) Collected: 04/16/24 2322    Order Status: Completed Updated: 04/17/24 1726     Enterococcus faecalis Detected     Enterococcus faecium Not Detected     Listeria monocytogenes Not Detected     Staphylococcus spp. Not Detected     Staphylococcus aureus Not Detected     Staphylococcus epidermidis Not Detected     Staphylococcus lugdunensis Not Detected     Streptococcus species Not Detected     Streptococcus agalactiae Not Detected     Streptococcus pneumoniae Not Detected     Streptococcus pyogenes Not Detected     Acinetobacter calcoaceticus/baumannii complex Not Detected     Bacteroides fragilis Not Detected     Enterobacterales Not Detected     Enterobacter cloacae complex Not Detected     Escherichia coli Not Detected     Klebsiella aerogenes Not Detected     Klebsiella oxytoca Not Detected     Klebsiella pneumoniae group Not Detected     Proteus Not Detected     Salmonella sp Not  Detected     Serratia marcescens Not Detected     Haemophilus influenzae Not Detected     Neisseria meningtidis Not Detected     Pseudomonas aeruginosa Not Detected     Stenotrophomonas maltophilia Not Detected     Candida albicans Not Detected     Candida auris Not Detected     Candida glabrata Not Detected     Candida krusei Not Detected     Candida parapsilosis Not Detected     Candida tropicalis Not Detected     Cryptococcus neoformans/gattii Not Detected     CTX-M (ESBL ) Test Not Applicable     IMP (Carbapenem resistant) Test Not Applicable     KPC resistance gene (Carbapenem resistant) Test Not Applicable     mcr-1  Test Not Applicable     mec A/C  Test Not Applicable     mec A/C and MREJ (MRSA) gene Test Not Applicable     NDM (Carbapenem resistant) Test Not Applicable     OXA-48-like (Carbapenem resistant) Test Not Applicable     van A/B (VRE gene) Not Detected     VIM (Carbapenem resistant) Test Not Applicable    Narrative:      Aerobic and anaerobic    Influenza A & B by Molecular [[id number]] Collected: 04/16/24 2347    Order Status: Completed Specimen: Nasopharyngeal Swab Updated: 04/17/24 0059     Influenza A, Molecular Negative     Influenza B, Molecular Negative     Flu A & B Source Nasal swab            I have reviewed all pertinent labs within the past 24 hours.    Estimated Creatinine Clearance: 12 mL/min (A) (based on SCr of 6.8 mg/dL (H)).    Diagnostic Results:  I have reviewed and interpreted all pertinent imaging results/findings within the past 24 hours.  Assessment and Plan:     Mr. Radha Abbott is a very pleasant 65 yo male with stage D HFrEF, ICMP with previous admission for ADHF/CS. He did undergo HM3 placement placed by Dr Washington on 12/1/23. Lengthy post op course complicated by vasoplegia(requiring Giaprezza), debility, malnutrition/impaired swallowing, and renal failure requiring HD(since weaned off). Once medically stable, he was transferred to Rehab for further PT/OT/ST. Of  note, pt left with permacath still in place. He has done really well in rehab and now has continued to progress well at home. He is on a very high dose of Bumex 4 mg twice daily. VAD speed is at 5000 rpm. No VAD alarms noted on interrogation occasional PI events . Today he presents to the ED for infectious workup due to complaints of fevers of 101.5F associated with fatigue, generalized weakness, lower back pain and HA. He also reports a decline in appetite. Wife changes his driveline dressing every Saturday and has not noticed any purulance with dressing changes. Body aches/weakness started around 2am last night followed by fevers around 5pm. He has been taking tylenol which has helped brng down fevers but has not helped with HA. HA is 5/10 in intensity. He reports slight increase in body weight/ fluid retention yesterday for which he took a dose of Metolazone with a drop in weight by ~5lbs. Denies any SOB, LE swelling at this time.      2D Echo with CFD done on 3/26/2024  LVAD: There is a Heartmate III LVAD running at 5000 RPM. The aortic valve does not open. The ventricular septum is at midline. Inflow cannula well seated at the apex. Outflow graft not visualized.    Left Ventricle: The left ventricle is moderately dilated. Normal wall thickness. Global hypokinesis present. Septal motion is abnormal. There is severely reduced systolic function with a visually estimated ejection fraction of 5 - 10%. There is diastolic dysfunction but grade cannot be determined.    Right Ventricle: Mild right ventricular enlargement. Wall thickness is normal. Right ventricle wall motion  is normal. Systolic function is normal. Pacemaker lead present in the ventricle.    Left Atrium: Left atrium is severely dilated.    Right Atrium: Right atrium is moderately dilated.    Mitral Valve: The mitral valve is repaired with an Noble stitch. There is mild regurgitation.    IVC/SVC: Normal venous pressure at 3 mmHg.    * Pneumonia due to  COVID-19 virus  -4/16/24 he presented to the ED for infectious workup due to complaints of fevers of 101.5F associated with fatigue, generalized weakness, lower back pain and HA that started this morning. No evidence of drive line infection. No history of line infection.   - COVID +ve  - CT chest/ abdomen / pelvis 4/16 showed new RML opacity  - UA clean, blood cxs from 4/16 +ve for E faecalis  - Vanc and Zosyn started on admit.  Changed to Ampicillin and Ceftriaxone   - Appreciate ID's help. Plan for 3 day course of Remdesivir  - TTE to R/O endocarditis negative,    - continue tylenol for fevers    Bacteremia  -blood cxs from 4/16 +ve for E faecalis   -ID following  -E faecalis is a known cause of endocarditis and AICD lead infections.   -TTE was negative for vegetation.  However TTE does not rule out valvular or lead vegetation. Will treat this as endocarditis with 6 weeks of IV antibiotics followed by suppressive therapy   -Currently on Ampicillin and Ceftriaxone     LVAD (left ventricular assist device) present  -HeartMate 3 Implanted  12/1/2023  as DT  -Cont Coumadin, dosing by PharmD.  Goal INR 2.0-3.0 . Therapeutic today  -Antiplatelets Not on ASA due to low H/H on implant   -LDH is stable overall today. Will continue to monitor daily.  -Speed set at  5000     -Interrogation notable for no events  -ECHO 4/18/24 with speed at 5000 EF: 10-15%, LVEDD: 6.1cm, RV size normal function mod reduced   -RHC 1/22/24 with RA 13, PCWP 22, PA 50/25, CO 5.8, CI 3.0   -Not listed for OHTx, declined for OHTX due to comorbidities     Procedure: Device Interrogation Including analysis of device parameters  Current Settings: Ventricular Assist Device  Review of device function is stable/unstable stable        4/20/2024     8:26 AM 4/20/2024     4:54 AM 4/19/2024    11:33 PM 4/19/2024     8:10 PM 4/19/2024     5:34 PM 4/19/2024    12:10 PM 4/19/2024     8:05 AM   TXP LVAD INTERROGATIONS   Type HeartMate3 HeartMate3 HeartMate3  HeartMate3 HeartMate3 HeartMate3 HeartMate3   Flow 3.8 4 4.1 3.8 3.8 3.9 4.1   Speed 5000 5000 500 5000 5000 5000 5000   PI 5.7 4.8 3.9 5.3 6 5.2 4.9   Power ([place]) 3.5 3.4 3.5 3.5 3.5 3.5 3.4   LSL 4600   4600   4600   Pulsatility Intermittent pulse Intermittent pulse Intermittent pulse Pulse Intermittent pulse Intermittent pulse Intermittent pulse         Hyponatremia  -POA  -Daily labs       Stage 4 chronic kidney disease  -sCr has bumped from 4.3 to 6.8 (baseline 3's - 4's).   -Holding Bumex for now and will monitor response  -Will give IV fluids today and see if it improves.  If no improvement tomorrow then will get Nephrology consult     Chronic combined systolic and diastolic heart failure  -ICM  -Last 2D Echo  4/18/24 : LVEF 10-15%, LVEDD  6.1 cm  -Euvolemic on examination today  -Was on Bumex 4mg BID at home.  Will hold today given creatinine is up at 6.8  -GDMT with Hydralazine  -2g Na dietary restriction, 1500 mL fluid restriction, strict I/Os      Ventricular tachycardia  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    Type 2 diabetes mellitus without complication, without long-term current use of insulin  -SSI    Hypertension  Continue hydralazine    Headache  C/o of HA on admit with no improvement despite tylenol  - CTH done 4/16 with no acute processes        DENIZ Faye  Heart Transplant  [facility] - Cardiology Step[facility]

## 2024-04-20 NOTE — SUBJECTIVE & OBJECTIVE
Interval History:   Patient looks much better today and reports he is feeling better.  He has been afebrile the last 24 hours.  We held his Bumex in the setting of worsening renal function.  His creatinine is up again today 6.8 from 6.0 yesterday.  He does not look volume overloaded on exam.  Will try normal saline 50cc/hr for 10 hours and monitor response.  If no improvement tomorrow then will consider Nephrology consult.  Appreciate ID recommendations.  He is on Remdesivir for total of 3 days.  Continued Ampicillin and Ceftriaxone.  Repeat blood cultures ordered this am.      Continuous Infusions:  Current Facility-Administered Medications   Medication Dose Route Frequency Last Rate Last Admin    sodium chloride 0.9%   Intravenous Continuous         Scheduled Meds:  Current Facility-Administered Medications   Medication Dose Route Frequency    amiodarone  400 mg Oral Daily    ampicillin IV (PEDS and ADULTS)  2 g Intravenous Q12H    cefTRIAXone (Rocephin) IV (PEDS and ADULTS)  2 g Intravenous Q12H    famotidine  20 mg Oral Daily    hydrALAZINE  75 mg Oral Q8H    magnesium oxide  400 mg Oral Daily    ondansetron  4 mg Intravenous Once    remdesivir infusion  100 mg Intravenous Daily    sodium bicarbonate  325 mg Oral TID    traZODone  25 mg Oral QHS    venlafaxine  37.5 mg Oral Daily    warfarin  2.5 mg Oral Once per day on Sunday Monday Wednesday Friday Saturday    And    warfarin  3.75 mg Oral Every Tues, Thurs     PRN Meds:  Current Facility-Administered Medications:     acetaminophen, 1,000 mg, Oral, Q8H PRN    dextrose 10%, 12.5 g, Intravenous, PRN    dextrose 10%, 25 g, Intravenous, PRN    glucagon (human recombinant), 1 mg, Intramuscular, PRN    glucose, 16 g, Oral, PRN    glucose, 24 g, Oral, PRN    insulin aspart U-100, 0-5 Units, Subcutaneous, QID (AC + HS) PRN    senna-docusate 8.6-50 mg, 2 tablet, Oral, Daily PRN    Review of patient's allergies indicates:  No Known Allergies  Objective:     Vital Signs  (Most Recent):  Temp: 98.5 °F (36.9 °C) (04/20/24 0740)  Pulse: 84 (04/20/24 0740)  Resp: 18 (04/20/24 0740)  BP: (!) 90/0 (04/20/24 0826)  SpO2: 97 % (04/20/24 0740) Vital Signs (24h Range):  Temp:  [97.7 °F (36.5 °C)-98.5 °F (36.9 °C)] 98.5 °F (36.9 °C)  Pulse:  [81-94] 84  Resp:  [16-20] 18  SpO2:  [95 %-98 %] 97 %  BP: ()/(0-79) 90/0     Patient Vitals for the past 72 hrs (Last 3 readings):   Weight   04/19/24 1102 75.3 kg (166 lb 0.1 oz)   04/18/24 1240 75 kg (165 lb 5.5 oz)   04/18/24 0913 75.2 kg (165 lb 12.6 oz)     Body mass index is 21.31 kg/m².      Intake/Output Summary (Last 24 hours) at 4/20/2024 1007  Last data filed at 4/20/2024 0500  Gross per 24 hour   Intake 402 ml   Output 300 ml   Net 102 ml       Hemodynamic Parameters:       Telemetry: SR with PVC's       Physical Exam  Constitutional:       Appearance: He is ill-appearing.   HENT:      Head: Normocephalic and atraumatic.   Eyes:      Conjunctiva/sclera: Conjunctivae normal.      Pupils: Pupils are equal, round, and reactive to light.   Neck:      Comments: Do not appreciate elevated JVP  Cardiovascular:      Rate and Rhythm: Normal rate and regular rhythm.      Comments: Smooth VAD hum  Pulmonary:      Effort: Pulmonary effort is normal.      Comments: Breath sounds are decreased right base. Lungs essentially CTA bilaterally  Abdominal:      General: Bowel sounds are normal.      Palpations: Abdomen is soft.   Musculoskeletal:         General: No swelling. Normal range of motion.      Cervical back: Normal range of motion and neck supple.   Skin:     General: Skin is warm and dry.      Capillary Refill: Capillary refill takes 2 to 3 seconds.   Neurological:      General: No focal deficit present.      Mental Status: He is alert and oriented to person, place, and time.            Significant Labs:  CBC:  Recent Labs   Lab 04/18/24  0534 04/19/24  0530 04/20/24  0234   WBC 11.26 8.74 8.54   RBC 3.44* 3.41* 3.18*   HGB 9.7* 9.7* 8.9*   HCT  30.6* 29.5* 27.3*    362 333   MCV 89 87 86   MCH 28.2 28.4 28.0   MCHC 31.7* 32.9 32.6     BNP:  Recent Labs   Lab 04/17/24 0409 04/19/24  0530   * 409*     CMP:  Recent Labs   Lab 04/17/24  0030 04/17/24  0409 04/18/24  0534 04/19/24  0530 04/20/24  0234   * 117* 138* 104 113*   CALCIUM 8.9 9.5 9.1 9.2 8.9   ALBUMIN 3.0* 3.2*  --  3.0*  --    PROT 7.7 8.2  --  8.2  --    * 134* 135* 135* 134*   K 4.0 3.9 4.0 3.6 3.7   CO2 23 25 25 24 21*   CL 95 94* 93* 92* 92*   BUN 76* 82* 86* 98* 109*   CREATININE 4.3* 4.3* 5.0* 6.0* 6.8*   ALKPHOS 127 143*  --  139*  --    ALT 88* 101*  --  516*  --    AST 76* 88*  --  644*  --    BILITOT 0.4 0.5  --  0.8  --       Coagulation:   Recent Labs   Lab 04/18/24 0534 04/19/24 0530 04/20/24  0234   INR 1.9* 2.2* 2.6*   APTT 46.1* 45.3* 49.8*     LDH:  Recent Labs   Lab 04/18/24 0534 04/19/24  0530 04/20/24  0234   * 1,048* 591*     Microbiology:  Microbiology Results (last 7 days)       Procedure Component Value Units Date/Time    Blood culture [[id number]] Collected: 04/20/24 0234    Order Status: Sent Specimen: Blood Updated: 04/20/24 0342    Blood culture [[id number]] Collected: 04/20/24 0240    Order Status: Sent Specimen: Blood Updated: 04/20/24 0342    Blood culture x two cultures. Draw prior to antibiotics. [[id number]]  (Abnormal) Collected: 04/16/24 2322    Order Status: Completed Specimen: Blood from Peripheral, Hand, Right Updated: 04/19/24 1501     Blood Culture, Routine Gram stain aer bottle: Gram positive cocci      Gram stain nata bottle: Gram positive cocci      Positive results previously called 04/17/2024 15:08      ENTEROCOCCUS FAECALIS  For susceptibility see order #[id number]      Narrative:      Aerobic and anaerobic    Blood culture x two cultures. Draw prior to antibiotics. [[id number]]  (Abnormal)  (Susceptibility) Collected: 04/16/24 2322    Order Status: Completed Specimen: Blood from Peripheral, Hand, Left Updated:  04/19/24 1500     Blood Culture, Routine Gram stain aer bottle: Gram positive cocci      Gram stain nata bottle: Gram positive cocci      Results called to and read back by: Rachael Horan RN 04/17/2024  15:08      ENTEROCOCCUS FAECALIS    Narrative:      Aerobic and anaerobic    Blood culture [[id number]] Collected: 04/18/24 0535    Order Status: Completed Specimen: Blood Updated: 04/19/24 1013     Blood Culture, Routine No Growth to date      No Growth to date    Blood culture [[id number]] Collected: 04/18/24 0534    Order Status: Completed Specimen: Blood Updated: 04/19/24 1013     Blood Culture, Routine No Growth to date      No Growth to date    Rapid Organism ID by PCR (from Blood culture) [[id number]]  (Abnormal) Collected: 04/16/24 2322    Order Status: Completed Updated: 04/17/24 1726     Enterococcus faecalis Detected     Enterococcus faecium Not Detected     Listeria monocytogenes Not Detected     Staphylococcus spp. Not Detected     Staphylococcus aureus Not Detected     Staphylococcus epidermidis Not Detected     Staphylococcus lugdunensis Not Detected     Streptococcus species Not Detected     Streptococcus agalactiae Not Detected     Streptococcus pneumoniae Not Detected     Streptococcus pyogenes Not Detected     Acinetobacter calcoaceticus/baumannii complex Not Detected     Bacteroides fragilis Not Detected     Enterobacterales Not Detected     Enterobacter cloacae complex Not Detected     Escherichia coli Not Detected     Klebsiella aerogenes Not Detected     Klebsiella oxytoca Not Detected     Klebsiella pneumoniae group Not Detected     Proteus Not Detected     Salmonella sp Not Detected     Serratia marcescens Not Detected     Haemophilus influenzae Not Detected     Neisseria meningtidis Not Detected     Pseudomonas aeruginosa Not Detected     Stenotrophomonas maltophilia Not Detected     Candida albicans Not Detected     Candida auris Not Detected     Candida glabrata Not Detected     Candida  krusei Not Detected     Candida parapsilosis Not Detected     Candida tropicalis Not Detected     Cryptococcus neoformans/gattii Not Detected     CTX-M (ESBL ) Test Not Applicable     IMP (Carbapenem resistant) Test Not Applicable     KPC resistance gene (Carbapenem resistant) Test Not Applicable     mcr-1  Test Not Applicable     mec A/C  Test Not Applicable     mec A/C and MREJ (MRSA) gene Test Not Applicable     NDM (Carbapenem resistant) Test Not Applicable     OXA-48-like (Carbapenem resistant) Test Not Applicable     van A/B (VRE gene) Not Detected     VIM (Carbapenem resistant) Test Not Applicable    Narrative:      Aerobic and anaerobic    Influenza A & B by Molecular [[id number]] Collected: 04/16/24 4407    Order Status: Completed Specimen: Nasopharyngeal Swab Updated: 04/17/24 0059     Influenza A, Molecular Negative     Influenza B, Molecular Negative     Flu A & B Source Nasal swab            I have reviewed all pertinent labs within the past 24 hours.    Estimated Creatinine Clearance: 12 mL/min (A) (based on SCr of 6.8 mg/dL (H)).    Diagnostic Results:  I have reviewed and interpreted all pertinent imaging results/findings within the past 24 hours.

## 2024-04-20 NOTE — PROGRESS NOTES
[facility]  Infectious Disease  Progress Note    Patient Name: Radha Abbott  MRN: [id number]  Admission Date: 4/16/2024  Length of Stay: 4 days  Attending Physician: Brayan Ocampo MD  Primary Care Provider: Vasu Kong MD    Isolation Status: Airborne and Contact and Droplet  Assessment/Plan:      ID  Bacteremia  64 year old male with a history of LVAD placement in December 2023 and AICD in place..  Patient is admitted with E faecalis bacteremia.  Source is unclear.  Per patient's wife, his drive line exit site looked clean.   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  TTE was negative for vegetation.  However TTE does not rule out valvular or lead vegetation.  Will plan to treat for presumptive endocarditis for 6 weeks with ceftriaxone and ampicillin.    Plan  Ampicillin 2 grams IV q 12 hours (renally dosed).  Ceftriaxone 2 grams IV q 12 hours.  Follow up results of blood cultures.        Anticipated Disposition: TBD    Thank you for your consult. I will follow-up with patient. Please contact us if you have any additional questions.    Axel Caban MD  Infectious Disease  [facility]    Subjective:     Principal Problem:Pneumonia due to COVID-19 virus    HPI: 64 year old male with a history of HFrEF s/p LVAD  placement in December of 2023.  Patient is admitted to the hospital with a 4 day history of fatigue and fevers.  He denies any other symptoms.  He reports some occasional cough.  He has had some lower back pain.  COVID testing was positive.  Blood cultures are now also positive for GPC.  Per wife, she hasn't had any with drainage from his drive line exit site.  Interval History: Feeling better today.    Review of Systems   All other systems reviewed and are negative.    Objective:     Vital Signs (Most Recent):  Temp: 98.5 °F (36.9 °C) (04/20/24 0740)  Pulse: 75 (04/20/24 1508)  Resp: 18 (04/20/24 0740)  BP: (!) 86/0 (04/20/24 1204)  SpO2: 97 % (04/20/24  0740) Vital Signs (24h Range):  Temp:  [97.7 °F (36.5 °C)-98.5 °F (36.9 °C)] 98.5 °F (36.9 °C)  Pulse:  [75-92] 75  Resp:  [18-20] 18  SpO2:  [95 %-98 %] 97 %  BP: ()/(0-79) 86/0     Weight: 75.3 kg (166 lb 0.1 oz)  Body mass index is 21.31 kg/m².    Estimated Creatinine Clearance: 12 mL/min (A) (based on SCr of 6.8 mg/dL (H)).     Physical Exam  Vitals and nursing note reviewed.   Constitutional:       General: He is not in acute distress.     Appearance: He is well-developed. He is not diaphoretic.   HENT:      Head: Normocephalic and atraumatic.      Right Ear: External ear normal.      Left Ear: External ear normal.      Nose: Nose normal.      Mouth/Throat:      Pharynx: No oropharyngeal exudate.   Eyes:      General: No scleral icterus.        Right eye: No discharge.         Left eye: No discharge.      Conjunctiva/sclera: Conjunctivae normal.      Pupils: Pupils are equal, round, and reactive to light.   Neck:      Thyroid: No thyromegaly.      Vascular: No JVD.      Trachea: No tracheal deviation.   Cardiovascular:      Comments: LVAD hum  Pulmonary:      Effort: Pulmonary effort is normal. No respiratory distress.      Breath sounds: Normal breath sounds. No stridor. No wheezing or rales.   Chest:      Chest wall: No tenderness.   Abdominal:      General: Bowel sounds are normal. There is no distension.      Palpations: Abdomen is soft. There is no mass.      Tenderness: There is no abdominal tenderness. There is no guarding or rebound.      Comments: LVAD drive line present.   Musculoskeletal:         General: No tenderness. Normal range of motion.      Cervical back: Normal range of motion and neck supple.   Lymphadenopathy:      Cervical: No cervical adenopathy.   Skin:     General: Skin is warm.      Coloration: Skin is not pale.      Findings: No erythema or rash.   Neurological:      Mental Status: He is alert and oriented to person, place, and time.      Cranial Nerves: No cranial nerve  deficit.      Motor: No abnormal muscle tone.      Coordination: Coordination normal.      Deep Tendon Reflexes: Reflexes normal.          Significant Labs:   Microbiology Results (last 7 days)       Procedure Component Value Units Date/Time    Blood culture [[id number]] Collected: 04/20/24 0234    Order Status: Completed Specimen: Blood Updated: 04/20/24 1115     Blood Culture, Routine No Growth to date    Blood culture [[id number]] Collected: 04/20/24 0240    Order Status: Completed Specimen: Blood Updated: 04/20/24 1115     Blood Culture, Routine No Growth to date    Blood culture [[id number]] Collected: 04/18/24 0535    Order Status: Completed Specimen: Blood Updated: 04/20/24 1012     Blood Culture, Routine No Growth to date      No Growth to date      No Growth to date    Blood culture [[id number]] Collected: 04/18/24 0534    Order Status: Completed Specimen: Blood Updated: 04/20/24 1012     Blood Culture, Routine No Growth to date      No Growth to date      No Growth to date    Blood culture x two cultures. Draw prior to antibiotics. [[id number]]  (Abnormal) Collected: 04/16/24 2322    Order Status: Completed Specimen: Blood from Peripheral, Hand, Right Updated: 04/19/24 1501     Blood Culture, Routine Gram stain aer bottle: Gram positive cocci      Gram stain nata bottle: Gram positive cocci      Positive results previously called 04/17/2024 15:08      ENTEROCOCCUS FAECALIS  For susceptibility see order #[id number]      Narrative:      Aerobic and anaerobic    Blood culture x two cultures. Draw prior to antibiotics. [[id number]]  (Abnormal)  (Susceptibility) Collected: 04/16/24 2322    Order Status: Completed Specimen: Blood from Peripheral, Hand, Left Updated: 04/19/24 1500     Blood Culture, Routine Gram stain aer bottle: Gram positive cocci      Gram stain nata bottle: Gram positive cocci      Results called to and read back by: Rachael Horan RN 04/17/2024  15:08      ENTEROCOCCUS FAECALIS    Narrative:       Aerobic and anaerobic    Rapid Organism ID by PCR (from Blood culture) [[id number]]  (Abnormal) Collected: 04/16/24 7812    Order Status: Completed Updated: 04/17/24 1726     Enterococcus faecalis Detected     Enterococcus faecium Not Detected     Listeria monocytogenes Not Detected     Staphylococcus spp. Not Detected     Staphylococcus aureus Not Detected     Staphylococcus epidermidis Not Detected     Staphylococcus lugdunensis Not Detected     Streptococcus species Not Detected     Streptococcus agalactiae Not Detected     Streptococcus pneumoniae Not Detected     Streptococcus pyogenes Not Detected     Acinetobacter calcoaceticus/baumannii complex Not Detected     Bacteroides fragilis Not Detected     Enterobacterales Not Detected     Enterobacter cloacae complex Not Detected     Escherichia coli Not Detected     Klebsiella aerogenes Not Detected     Klebsiella oxytoca Not Detected     Klebsiella pneumoniae group Not Detected     Proteus Not Detected     Salmonella sp Not Detected     Serratia marcescens Not Detected     Haemophilus influenzae Not Detected     Neisseria meningtidis Not Detected     Pseudomonas aeruginosa Not Detected     Stenotrophomonas maltophilia Not Detected     Candida albicans Not Detected     Candida auris Not Detected     Candida glabrata Not Detected     Candida krusei Not Detected     Candida parapsilosis Not Detected     Candida tropicalis Not Detected     Cryptococcus neoformans/gattii Not Detected     CTX-M (ESBL ) Test Not Applicable     IMP (Carbapenem resistant) Test Not Applicable     KPC resistance gene (Carbapenem resistant) Test Not Applicable     mcr-1  Test Not Applicable     mec A/C  Test Not Applicable     mec A/C and MREJ (MRSA) gene Test Not Applicable     NDM (Carbapenem resistant) Test Not Applicable     OXA-48-like (Carbapenem resistant) Test Not Applicable     van A/B (VRE gene) Not Detected     VIM (Carbapenem resistant) Test Not Applicable    Narrative:       Aerobic and anaerobic    Influenza A & B by Molecular [[id number]] Collected: 04/16/24 7942    Order Status: Completed Specimen: Nasopharyngeal Swab Updated: 04/17/24 0059     Influenza A, Molecular Negative     Influenza B, Molecular Negative     Flu A & B Source Nasal swab            Significant Imaging: I have reviewed all pertinent imaging results/findings within the past 24 hours.

## 2024-04-20 NOTE — PROGRESS NOTES
04/20/2024  Monet Aguiar    Current provider:  Brayan Ocampo MD    Device interrogation:      4/20/2024     8:26 AM 4/20/2024     4:54 AM 4/19/2024    11:33 PM 4/19/2024     8:10 PM 4/19/2024     5:34 PM 4/19/2024    12:10 PM 4/19/2024     8:05 AM   TXP LVAD INTERROGATIONS   Type HeartMate3 HeartMate3 HeartMate3 HeartMate3 HeartMate3 HeartMate3 HeartMate3   Flow 3.8 4 4.1 3.8 3.8 3.9 4.1   Speed 5000 5000 500 5000 5000 5000 5000   PI 5.7 4.8 3.9 5.3 6 5.2 4.9   Power ([place]) 3.5 3.4 3.5 3.5 3.5 3.5 3.4   LSL 4600   4600   4600   Pulsatility Intermittent pulse Intermittent pulse Intermittent pulse Pulse Intermittent pulse Intermittent pulse Intermittent pulse          Rounded on [facility] to ensure all mechanical assist device settings (IABP or VAD) were appropriate and all parameters were within limits.  I was able to ensure all back up equipment was present, the staff had no issues, and the Perfusion Department daily rounding was complete.      For implantable VADs: Interrogation of Ventricular assist device was performed with analysis of device parameters and review of device function. I have personally reviewed the interrogation findings and agree with findings as stated.     In emergency, the nursing units have been notified to contact the perfusion department either by:  Calling [phone] from 630am to 4pm Mon thru Fri, utilizing the On-Call Finder functionality of Epic and searching for Perfusion, or by contacting the hospital  from 4pm to 630am and on weekends and asking to speak with the perfusionist on call.    10:07 AM

## 2024-04-20 NOTE — CARE UPDATE
Care Update:     No acute events overnight. Patient in room 311/311 A. Blood glucose stable on current inpatient regimen.No hypoglycemia noted over the past 24-hours. Endocrine will continue to follow and manage insulin orders inpatient.       Steroid use- None.   Renal function-   Lab Results   Component Value Date    CREATININE 6.8 (H) 04/20/2024        Diet Renal Coumadin Restriction; Fluid - 1500mL; Isolation Tray - Regular [place]     POCT Glucose   Date Value Ref Range Status   04/20/2024 112 (H) 70 - 110 mg/dL Final   04/19/2024 158 (H) 70 - 110 mg/dL Final   04/19/2024 163 (H) 70 - 110 mg/dL Final   04/19/2024 162 (H) 70 - 110 mg/dL Final   04/19/2024 117 (H) 70 - 110 mg/dL Final   04/18/2024 209 (H) 70 - 110 mg/dL Final   04/18/2024 142 (H) 70 - 110 mg/dL Final   04/18/2024 155 (H) 70 - 110 mg/dL Final   04/18/2024 149 (H) 70 - 110 mg/dL Final   04/17/2024 154 (H) 70 - 110 mg/dL Final   04/17/2024 102 70 - 110 mg/dL Final   04/17/2024 190 (H) 70 - 110 mg/dL Final     Lab Results   Component Value Date    HGBA1C 7.4 (H) 04/17/2024       Endocrine  Type 2 diabetes mellitus without complication, without long-term current use of insulin  Endocrinology consulted for BG management.   BG goal 140-180     - Novolog (Insulin Aspart) prn for BG excursions [facility] SSI (150/50)  - BG checks AC/HS  - Hypoglycemia protocol in place     ** Please notify Endocrine for any change and/or advance in diet**  ** Please call Endocrine for any BG related issues **     Discharge Planning:   TBD. Please notify endocrinology prior to discharge.    Erasmo Parrish DNP, FNP-C  Department of Endocrinology  Inpatient Glycemic Management

## 2024-04-20 NOTE — PLAN OF CARE
VSS on RA. BG checked. Per wife, pt had 2 urine occurrences with unmeasurable amount. No blood noted in urine per wife     Problem: Diabetes Comorbidity  Goal: Blood Glucose Level Within Targeted Range  Outcome: Ongoing, Progressing     Problem: Adjustment to Illness (Sepsis/Septic Shock)  Goal: Optimal Coping  Outcome: Ongoing, Progressing     Problem: Respiratory Compromise (Pneumonia)  Goal: Effective Oxygenation and Ventilation  Outcome: Ongoing, Progressing

## 2024-04-20 NOTE — PROGRESS NOTES
[facility] - Cardiology Stepdown  Infectious Disease  Progress Note    Patient Name: Radha Abbott  MRN: [id number]  Admission Date: 4/16/2024  Length of Stay: 3 days  Attending Physician: Brayan Ocampo MD  Primary Care Provider: Vasu Kong MD    Isolation Status: Airborne and Contact and Droplet  Assessment/Plan:      ID  Bacteremia  64 year old male with a history of LVAD placement in December 2023 and AICD in place..  Patient is admitted with E faecalis bacteremia.  Source is unclear.  Per patient's wife, his drive line exit site looked clean.   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  TTE was negative for vegetation.  However TTE does not rule out valvular or lead vegetation.  Will plan to treat for presumptive endocarditis for 6 weeks with ceftriaxone and ampicillin.    Plan  Ampicillin 2 grams IV q 12 hours (renally dosed).  Ceftriaxone 2 grams IV q 12 hours.  Repeat blood cultures.        Anticipated Disposition: TBD    Thank you for your consult. I will follow-up with patient. Please contact us if you have any additional questions.    Axel Caban MD  Infectious Disease  [facility] - Cardiology Stepdown    Subjective:     Principal Problem:Pneumonia due to COVID-19 virus    HPI: 64 year old male with a history of HFrEF s/p LVAD  placement in December of 2023.  Patient is admitted to the hospital with a 4 day history of fatigue and fevers.  He denies any other symptoms.  He reports some occasional cough.  He has had some lower back pain.  COVID testing was positive.  Blood cultures are now also positive for GPC.  Per wife, she hasn't had any with drainage from his drive line exit site.  Interval History: No adverse events.  Patient feels better today.    Review of Systems   Constitutional:  Positive for fatigue and fever.   Musculoskeletal:  Positive for back pain.   All other systems reviewed and are negative.    Objective:     Vital Signs (Most Recent):  Temp: 97.7 °F (36.5 °C) (04/19/24  1620)  Pulse: 92 (04/19/24 1800)  Resp: 20 (04/19/24 1620)  BP: 106/69 (04/19/24 1734)  SpO2: 96 % (04/19/24 1620) Vital Signs (24h Range):  Temp:  [97.7 °F (36.5 °C)-98.7 °F (37.1 °C)] 97.7 °F (36.5 °C)  Pulse:  [66-94] 92  Resp:  [16-20] 20  SpO2:  [91 %-98 %] 96 %  BP: ()/(0-77) 106/69     Weight: 75.3 kg (166 lb 0.1 oz)  Body mass index is 21.31 kg/m².    Estimated Creatinine Clearance: 13.6 mL/min (A) (based on SCr of 6 mg/dL (H)).     Physical Exam  Vitals and nursing note reviewed.   Constitutional:       General: He is not in acute distress.     Appearance: He is well-developed. He is not diaphoretic.   HENT:      Head: Normocephalic and atraumatic.      Right Ear: External ear normal.      Left Ear: External ear normal.      Nose: Nose normal.      Mouth/Throat:      Pharynx: No oropharyngeal exudate.   Eyes:      General: No scleral icterus.        Right eye: No discharge.         Left eye: No discharge.      Conjunctiva/sclera: Conjunctivae normal.      Pupils: Pupils are equal, round, and reactive to light.   Neck:      Thyroid: No thyromegaly.      Vascular: No JVD.      Trachea: No tracheal deviation.   Cardiovascular:      Comments: LVAD hum  Pulmonary:      Effort: Pulmonary effort is normal. No respiratory distress.      Breath sounds: Normal breath sounds. No stridor. No wheezing or rales.   Chest:      Chest wall: No tenderness.   Abdominal:      General: Bowel sounds are normal. There is no distension.      Palpations: Abdomen is soft. There is no mass.      Tenderness: There is no abdominal tenderness. There is no guarding or rebound.      Comments: LVAD drive line present.   Musculoskeletal:         General: No tenderness. Normal range of motion.      Cervical back: Normal range of motion and neck supple.   Lymphadenopathy:      Cervical: No cervical adenopathy.   Skin:     General: Skin is warm.      Coloration: Skin is not pale.      Findings: No erythema or rash.   Neurological:       Mental Status: He is alert and oriented to person, place, and time.      Cranial Nerves: No cranial nerve deficit.      Motor: No abnormal muscle tone.      Coordination: Coordination normal.      Deep Tendon Reflexes: Reflexes normal.          Significant Labs:   Microbiology Results (last 7 days)       Procedure Component Value Units Date/Time    Blood culture x two cultures. Draw prior to antibiotics. [[id number]]  (Abnormal) Collected: 04/16/24 2322    Order Status: Completed Specimen: Blood from Peripheral, Hand, Right Updated: 04/19/24 1501     Blood Culture, Routine Gram stain aer bottle: Gram positive cocci      Gram stain nata bottle: Gram positive cocci      Positive results previously called 04/17/2024 15:08      ENTEROCOCCUS FAECALIS  For susceptibility see order #[id number]      Narrative:      Aerobic and anaerobic    Blood culture x two cultures. Draw prior to antibiotics. [[id number]]  (Abnormal)  (Susceptibility) Collected: 04/16/24 2322    Order Status: Completed Specimen: Blood from Peripheral, Hand, Left Updated: 04/19/24 1500     Blood Culture, Routine Gram stain aer bottle: Gram positive cocci      Gram stain nata bottle: Gram positive cocci      Results called to and read back by: Rachael Horan RN 04/17/2024  15:08      ENTEROCOCCUS FAECALIS    Narrative:      Aerobic and anaerobic    Blood culture [[id number]] Collected: 04/18/24 0535    Order Status: Completed Specimen: Blood Updated: 04/19/24 1013     Blood Culture, Routine No Growth to date      No Growth to date    Blood culture [[id number]] Collected: 04/18/24 0534    Order Status: Completed Specimen: Blood Updated: 04/19/24 1013     Blood Culture, Routine No Growth to date      No Growth to date    Rapid Organism ID by PCR (from Blood culture) [[id number]]  (Abnormal) Collected: 04/16/24 2322    Order Status: Completed Updated: 04/17/24 1726     Enterococcus faecalis Detected     Enterococcus faecium Not Detected     Listeria monocytogenes  Not Detected     Staphylococcus spp. Not Detected     Staphylococcus aureus Not Detected     Staphylococcus epidermidis Not Detected     Staphylococcus lugdunensis Not Detected     Streptococcus species Not Detected     Streptococcus agalactiae Not Detected     Streptococcus pneumoniae Not Detected     Streptococcus pyogenes Not Detected     Acinetobacter calcoaceticus/baumannii complex Not Detected     Bacteroides fragilis Not Detected     Enterobacterales Not Detected     Enterobacter cloacae complex Not Detected     Escherichia coli Not Detected     Klebsiella aerogenes Not Detected     Klebsiella oxytoca Not Detected     Klebsiella pneumoniae group Not Detected     Proteus Not Detected     Salmonella sp Not Detected     Serratia marcescens Not Detected     Haemophilus influenzae Not Detected     Neisseria meningtidis Not Detected     Pseudomonas aeruginosa Not Detected     Stenotrophomonas maltophilia Not Detected     Candida albicans Not Detected     Candida auris Not Detected     Candida glabrata Not Detected     Candida krusei Not Detected     Candida parapsilosis Not Detected     Candida tropicalis Not Detected     Cryptococcus neoformans/gattii Not Detected     CTX-M (ESBL ) Test Not Applicable     IMP (Carbapenem resistant) Test Not Applicable     KPC resistance gene (Carbapenem resistant) Test Not Applicable     mcr-1  Test Not Applicable     mec A/C  Test Not Applicable     mec A/C and MREJ (MRSA) gene Test Not Applicable     NDM (Carbapenem resistant) Test Not Applicable     OXA-48-like (Carbapenem resistant) Test Not Applicable     van A/B (VRE gene) Not Detected     VIM (Carbapenem resistant) Test Not Applicable    Narrative:      Aerobic and anaerobic    Influenza A & B by Molecular [[id number]] Collected: 04/16/24 8917    Order Status: Completed Specimen: Nasopharyngeal Swab Updated: 04/17/24 0059     Influenza A, Molecular Negative     Influenza B, Molecular Negative     Flu A & B Source  Nasal swab            Significant Imaging: I have reviewed all pertinent imaging results/findings within the past 24 hours.

## 2024-04-20 NOTE — SUBJECTIVE & OBJECTIVE
Interval History: No adverse events.  Patient feels better today.    Review of Systems   Constitutional:  Positive for fatigue and fever.   Musculoskeletal:  Positive for back pain.   All other systems reviewed and are negative.    Objective:     Vital Signs (Most Recent):  Temp: 97.7 °F (36.5 °C) (04/19/24 1620)  Pulse: 92 (04/19/24 1800)  Resp: 20 (04/19/24 1620)  BP: 106/69 (04/19/24 1734)  SpO2: 96 % (04/19/24 1620) Vital Signs (24h Range):  Temp:  [97.7 °F (36.5 °C)-98.7 °F (37.1 °C)] 97.7 °F (36.5 °C)  Pulse:  [66-94] 92  Resp:  [16-20] 20  SpO2:  [91 %-98 %] 96 %  BP: ()/(0-77) 106/69     Weight: 75.3 kg (166 lb 0.1 oz)  Body mass index is 21.31 kg/m².    Estimated Creatinine Clearance: 13.6 mL/min (A) (based on SCr of 6 mg/dL (H)).     Physical Exam  Vitals and nursing note reviewed.   Constitutional:       General: He is not in acute distress.     Appearance: He is well-developed. He is not diaphoretic.   HENT:      Head: Normocephalic and atraumatic.      Right Ear: External ear normal.      Left Ear: External ear normal.      Nose: Nose normal.      Mouth/Throat:      Pharynx: No oropharyngeal exudate.   Eyes:      General: No scleral icterus.        Right eye: No discharge.         Left eye: No discharge.      Conjunctiva/sclera: Conjunctivae normal.      Pupils: Pupils are equal, round, and reactive to light.   Neck:      Thyroid: No thyromegaly.      Vascular: No JVD.      Trachea: No tracheal deviation.   Cardiovascular:      Comments: LVAD hum  Pulmonary:      Effort: Pulmonary effort is normal. No respiratory distress.      Breath sounds: Normal breath sounds. No stridor. No wheezing or rales.   Chest:      Chest wall: No tenderness.   Abdominal:      General: Bowel sounds are normal. There is no distension.      Palpations: Abdomen is soft. There is no mass.      Tenderness: There is no abdominal tenderness. There is no guarding or rebound.      Comments: LVAD drive line present.    Musculoskeletal:         General: No tenderness. Normal range of motion.      Cervical back: Normal range of motion and neck supple.   Lymphadenopathy:      Cervical: No cervical adenopathy.   Skin:     General: Skin is warm.      Coloration: Skin is not pale.      Findings: No erythema or rash.   Neurological:      Mental Status: He is alert and oriented to person, place, and time.      Cranial Nerves: No cranial nerve deficit.      Motor: No abnormal muscle tone.      Coordination: Coordination normal.      Deep Tendon Reflexes: Reflexes normal.          Significant Labs:   Microbiology Results (last 7 days)       Procedure Component Value Units Date/Time    Blood culture x two cultures. Draw prior to antibiotics. [[id number]]  (Abnormal) Collected: 04/16/24 2322    Order Status: Completed Specimen: Blood from Peripheral, Hand, Right Updated: 04/19/24 1501     Blood Culture, Routine Gram stain aer bottle: Gram positive cocci      Gram stain nata bottle: Gram positive cocci      Positive results previously called 04/17/2024 15:08      ENTEROCOCCUS FAECALIS  For susceptibility see order #[id number]      Narrative:      Aerobic and anaerobic    Blood culture x two cultures. Draw prior to antibiotics. [[id number]]  (Abnormal)  (Susceptibility) Collected: 04/16/24 2322    Order Status: Completed Specimen: Blood from Peripheral, Hand, Left Updated: 04/19/24 1500     Blood Culture, Routine Gram stain aer bottle: Gram positive cocci      Gram stain nata bottle: Gram positive cocci      Results called to and read back by: Rachael Horan RN 04/17/2024  15:08      ENTEROCOCCUS FAECALIS    Narrative:      Aerobic and anaerobic    Blood culture [[id number]] Collected: 04/18/24 0535    Order Status: Completed Specimen: Blood Updated: 04/19/24 1013     Blood Culture, Routine No Growth to date      No Growth to date    Blood culture [[id number]] Collected: 04/18/24 0534    Order Status: Completed Specimen: Blood Updated: 04/19/24  1013     Blood Culture, Routine No Growth to date      No Growth to date    Rapid Organism ID by PCR (from Blood culture) [[id number]]  (Abnormal) Collected: 04/16/24 6932    Order Status: Completed Updated: 04/17/24 1726     Enterococcus faecalis Detected     Enterococcus faecium Not Detected     Listeria monocytogenes Not Detected     Staphylococcus spp. Not Detected     Staphylococcus aureus Not Detected     Staphylococcus epidermidis Not Detected     Staphylococcus lugdunensis Not Detected     Streptococcus species Not Detected     Streptococcus agalactiae Not Detected     Streptococcus pneumoniae Not Detected     Streptococcus pyogenes Not Detected     Acinetobacter calcoaceticus/baumannii complex Not Detected     Bacteroides fragilis Not Detected     Enterobacterales Not Detected     Enterobacter cloacae complex Not Detected     Escherichia coli Not Detected     Klebsiella aerogenes Not Detected     Klebsiella oxytoca Not Detected     Klebsiella pneumoniae group Not Detected     Proteus Not Detected     Salmonella sp Not Detected     Serratia marcescens Not Detected     Haemophilus influenzae Not Detected     Neisseria meningtidis Not Detected     Pseudomonas aeruginosa Not Detected     Stenotrophomonas maltophilia Not Detected     Candida albicans Not Detected     Candida auris Not Detected     Candida glabrata Not Detected     Candida krusei Not Detected     Candida parapsilosis Not Detected     Candida tropicalis Not Detected     Cryptococcus neoformans/gattii Not Detected     CTX-M (ESBL ) Test Not Applicable     IMP (Carbapenem resistant) Test Not Applicable     KPC resistance gene (Carbapenem resistant) Test Not Applicable     mcr-1  Test Not Applicable     mec A/C  Test Not Applicable     mec A/C and MREJ (MRSA) gene Test Not Applicable     NDM (Carbapenem resistant) Test Not Applicable     OXA-48-like (Carbapenem resistant) Test Not Applicable     van A/B (VRE gene) Not Detected     VIM  (Carbapenem resistant) Test Not Applicable    Narrative:      Aerobic and anaerobic    Influenza A & B by Molecular [[id number]] Collected: 04/16/24 1996    Order Status: Completed Specimen: Nasopharyngeal Swab Updated: 04/17/24 0059     Influenza A, Molecular Negative     Influenza B, Molecular Negative     Flu A & B Source Nasal swab            Significant Imaging: I have reviewed all pertinent imaging results/findings within the past 24 hours.

## 2024-04-21 PROBLEM — N17.9 AKI (ACUTE KIDNEY INJURY): Status: ACTIVE | Noted: 2024-03-11

## 2024-04-21 LAB
ALLENS TEST: ABNORMAL
ALLENS TEST: ABNORMAL
ANION GAP SERPL CALC-SCNC: 19 MMOL/L (ref 8–16)
ANION GAP SERPL CALC-SCNC: 19 MMOL/L (ref 8–16)
APTT PPP: 56.8 SEC (ref 21–32)
BASOPHILS # BLD AUTO: 0.02 K/UL (ref 0–0.2)
BASOPHILS NFR BLD: 0.4 % (ref 0–1.9)
BUN SERPL-MCNC: 113 MG/DL (ref 8–23)
BUN SERPL-MCNC: 119 MG/DL (ref 8–23)
CALCIUM SERPL-MCNC: 8.4 MG/DL (ref 8.7–10.5)
CALCIUM SERPL-MCNC: 8.7 MG/DL (ref 8.7–10.5)
CHLORIDE SERPL-SCNC: 90 MMOL/L (ref 95–110)
CHLORIDE SERPL-SCNC: 91 MMOL/L (ref 95–110)
CO2 SERPL-SCNC: 21 MMOL/L (ref 23–29)
CO2 SERPL-SCNC: 22 MMOL/L (ref 23–29)
CREAT SERPL-MCNC: 8.2 MG/DL (ref 0.5–1.4)
CREAT SERPL-MCNC: 8.3 MG/DL (ref 0.5–1.4)
DELSYS: ABNORMAL
DELSYS: ABNORMAL
DIFFERENTIAL METHOD BLD: ABNORMAL
EOSINOPHIL # BLD AUTO: 0 K/UL (ref 0–0.5)
EOSINOPHIL NFR BLD: 0.6 % (ref 0–8)
ERYTHROCYTE [DISTWIDTH] IN BLOOD BY AUTOMATED COUNT: 17.1 % (ref 11.5–14.5)
EST. GFR  (NO RACE VARIABLE): 6.7 ML/MIN/1.73 M^2
EST. GFR  (NO RACE VARIABLE): 6.8 ML/MIN/1.73 M^2
FIO2: 21
GLUCOSE SERPL-MCNC: 102 MG/DL (ref 70–110)
GLUCOSE SERPL-MCNC: 161 MG/DL (ref 70–110)
HCO3 UR-SCNC: 24.5 MMOL/L (ref 24–28)
HCO3 UR-SCNC: 24.6 MMOL/L (ref 24–28)
HCT VFR BLD AUTO: 26.3 % (ref 40–54)
HCT VFR BLD CALC: 27 %PCV (ref 36–54)
HGB BLD-MCNC: 8.6 G/DL (ref 14–18)
IMM GRANULOCYTES # BLD AUTO: 0.03 K/UL (ref 0–0.04)
IMM GRANULOCYTES NFR BLD AUTO: 0.6 % (ref 0–0.5)
INR PPP: 4 (ref 0.8–1.2)
LACTATE SERPL-SCNC: 0.7 MMOL/L (ref 0.5–2.2)
LDH SERPL L TO P-CCNC: 419 U/L (ref 110–260)
LYMPHOCYTES # BLD AUTO: 0.4 K/UL (ref 1–4.8)
LYMPHOCYTES NFR BLD: 7.2 % (ref 18–48)
MAGNESIUM SERPL-MCNC: 2.3 MG/DL (ref 1.6–2.6)
MAGNESIUM SERPL-MCNC: 2.4 MG/DL (ref 1.6–2.6)
MCH RBC QN AUTO: 28.3 PG (ref 27–31)
MCHC RBC AUTO-ENTMCNC: 32.7 G/DL (ref 32–36)
MCV RBC AUTO: 87 FL (ref 82–98)
MODE: ABNORMAL
MODE: ABNORMAL
MONOCYTES # BLD AUTO: 0.4 K/UL (ref 0.3–1)
MONOCYTES NFR BLD: 7.4 % (ref 4–15)
NEUTROPHILS # BLD AUTO: 4.4 K/UL (ref 1.8–7.7)
NEUTROPHILS NFR BLD: 83.8 % (ref 38–73)
NRBC BLD-RTO: 0 /100 WBC
PCO2 BLDA: 41.2 MMHG (ref 35–45)
PCO2 BLDA: 42.6 MMHG (ref 35–45)
PH SMN: 7.37 [PH] (ref 7.35–7.45)
PH SMN: 7.38 [PH] (ref 7.35–7.45)
PHOSPHATE SERPL-MCNC: 7 MG/DL (ref 2.7–4.5)
PLATELET # BLD AUTO: 314 K/UL (ref 150–450)
PMV BLD AUTO: 9.6 FL (ref 9.2–12.9)
PO2 BLDA: 33 MMHG (ref 40–60)
PO2 BLDA: 36 MMHG (ref 40–60)
POC BE: -1 MMOL/L
POC BE: 0 MMOL/L
POC IONIZED CALCIUM: 1.08 MMOL/L (ref 1.06–1.42)
POC SATURATED O2: 61 % (ref 95–100)
POC SATURATED O2: 68 % (ref 95–100)
POC TCO2: 26 MMOL/L (ref 24–29)
POC TCO2: 26 MMOL/L (ref 24–29)
POCT GLUCOSE: 119 MG/DL (ref 70–110)
POCT GLUCOSE: 163 MG/DL (ref 70–110)
POTASSIUM BLD-SCNC: 3.4 MMOL/L (ref 3.5–5.1)
POTASSIUM SERPL-SCNC: 3.4 MMOL/L (ref 3.5–5.1)
POTASSIUM SERPL-SCNC: 3.4 MMOL/L (ref 3.5–5.1)
PROTHROMBIN TIME: 40.3 SEC (ref 9–12.5)
RBC # BLD AUTO: 3.04 M/UL (ref 4.6–6.2)
SAMPLE: ABNORMAL
SAMPLE: ABNORMAL
SITE: ABNORMAL
SITE: ABNORMAL
SODIUM BLD-SCNC: 130 MMOL/L (ref 136–145)
SODIUM SERPL-SCNC: 131 MMOL/L (ref 136–145)
SODIUM SERPL-SCNC: 131 MMOL/L (ref 136–145)
SP02: 99
WBC # BLD AUTO: 5.29 K/UL (ref 3.9–12.7)

## 2024-04-21 PROCEDURE — 25000003 PHARM REV CODE 250: Performed by: REGISTERED NURSE

## 2024-04-21 PROCEDURE — 51798 US URINE CAPACITY MEASURE: CPT

## 2024-04-21 PROCEDURE — 27000248 HC VAD-ADDITIONAL DAY

## 2024-04-21 PROCEDURE — 83605 ASSAY OF LACTIC ACID: CPT | Performed by: STUDENT IN AN ORGANIZED HEALTH CARE EDUCATION/TRAINING PROGRAM

## 2024-04-21 PROCEDURE — 80048 BASIC METABOLIC PNL TOTAL CA: CPT | Mod: 91 | Performed by: REGISTERED NURSE

## 2024-04-21 PROCEDURE — 82803 BLOOD GASES ANY COMBINATION: CPT

## 2024-04-21 PROCEDURE — 83735 ASSAY OF MAGNESIUM: CPT | Mod: 91 | Performed by: REGISTERED NURSE

## 2024-04-21 PROCEDURE — 84100 ASSAY OF PHOSPHORUS: CPT | Performed by: STUDENT IN AN ORGANIZED HEALTH CARE EDUCATION/TRAINING PROGRAM

## 2024-04-21 PROCEDURE — 27000207 HC ISOLATION

## 2024-04-21 PROCEDURE — 99232 SBSQ HOSP IP/OBS MODERATE 35: CPT | Mod: ,,, | Performed by: NURSE PRACTITIONER

## 2024-04-21 PROCEDURE — 25000003 PHARM REV CODE 250: Performed by: INTERNAL MEDICINE

## 2024-04-21 PROCEDURE — 25000003 PHARM REV CODE 250: Performed by: STUDENT IN AN ORGANIZED HEALTH CARE EDUCATION/TRAINING PROGRAM

## 2024-04-21 PROCEDURE — 93750 INTERROGATION VAD IN PERSON: CPT | Mod: ,,, | Performed by: INTERNAL MEDICINE

## 2024-04-21 PROCEDURE — 85025 COMPLETE CBC W/AUTO DIFF WBC: CPT | Performed by: STUDENT IN AN ORGANIZED HEALTH CARE EDUCATION/TRAINING PROGRAM

## 2024-04-21 PROCEDURE — 83615 LACTATE (LD) (LDH) ENZYME: CPT | Performed by: STUDENT IN AN ORGANIZED HEALTH CARE EDUCATION/TRAINING PROGRAM

## 2024-04-21 PROCEDURE — 36415 COLL VENOUS BLD VENIPUNCTURE: CPT | Performed by: STUDENT IN AN ORGANIZED HEALTH CARE EDUCATION/TRAINING PROGRAM

## 2024-04-21 PROCEDURE — 02HV33Z INSERTION OF INFUSION DEVICE INTO SUPERIOR VENA CAVA, PERCUTANEOUS APPROACH: ICD-10-PCS | Performed by: INTERNAL MEDICINE

## 2024-04-21 PROCEDURE — 85610 PROTHROMBIN TIME: CPT | Performed by: STUDENT IN AN ORGANIZED HEALTH CARE EDUCATION/TRAINING PROGRAM

## 2024-04-21 PROCEDURE — 83735 ASSAY OF MAGNESIUM: CPT | Performed by: STUDENT IN AN ORGANIZED HEALTH CARE EDUCATION/TRAINING PROGRAM

## 2024-04-21 PROCEDURE — 63600175 PHARM REV CODE 636 W HCPCS: Performed by: INTERNAL MEDICINE

## 2024-04-21 PROCEDURE — 85730 THROMBOPLASTIN TIME PARTIAL: CPT | Performed by: STUDENT IN AN ORGANIZED HEALTH CARE EDUCATION/TRAINING PROGRAM

## 2024-04-21 PROCEDURE — 99233 SBSQ HOSP IP/OBS HIGH 50: CPT | Mod: ,,, | Performed by: PHYSICIAN ASSISTANT

## 2024-04-21 PROCEDURE — 80048 BASIC METABOLIC PNL TOTAL CA: CPT | Performed by: STUDENT IN AN ORGANIZED HEALTH CARE EDUCATION/TRAINING PROGRAM

## 2024-04-21 PROCEDURE — 99900035 HC TECH TIME PER 15 MIN (STAT)

## 2024-04-21 PROCEDURE — 20600001 HC STEP DOWN PRIVATE ROOM

## 2024-04-21 RX ORDER — POTASSIUM CHLORIDE 20 MEQ/1
20 TABLET, EXTENDED RELEASE ORAL ONCE
Status: COMPLETED | OUTPATIENT
Start: 2024-04-21 | End: 2024-04-21

## 2024-04-21 RX ORDER — MUPIROCIN 20 MG/G
OINTMENT TOPICAL 2 TIMES DAILY
Status: COMPLETED | OUTPATIENT
Start: 2024-04-21 | End: 2024-04-26

## 2024-04-21 RX ORDER — GABAPENTIN 100 MG/1
100 CAPSULE ORAL 2 TIMES DAILY
Status: DISCONTINUED | OUTPATIENT
Start: 2024-04-21 | End: 2024-05-01 | Stop reason: HOSPADM

## 2024-04-21 RX ORDER — TRAMADOL HYDROCHLORIDE 50 MG/1
50 TABLET ORAL ONCE
Status: COMPLETED | OUTPATIENT
Start: 2024-04-21 | End: 2024-04-21

## 2024-04-21 RX ADMIN — HYDRALAZINE HYDROCHLORIDE 75 MG: 50 TABLET ORAL at 08:04

## 2024-04-21 RX ADMIN — GABAPENTIN 100 MG: 100 CAPSULE ORAL at 04:04

## 2024-04-21 RX ADMIN — VENLAFAXINE 37.5 MG: 37.5 TABLET ORAL at 10:04

## 2024-04-21 RX ADMIN — TRAMADOL HYDROCHLORIDE 50 MG: 50 TABLET, COATED ORAL at 04:04

## 2024-04-21 RX ADMIN — Medication 400 MG: at 10:04

## 2024-04-21 RX ADMIN — SODIUM BICARBONATE 325 MG: 325 TABLET ORAL at 10:04

## 2024-04-21 RX ADMIN — SODIUM BICARBONATE 325 MG: 325 TABLET ORAL at 04:04

## 2024-04-21 RX ADMIN — SODIUM BICARBONATE 325 MG: 325 TABLET ORAL at 08:04

## 2024-04-21 RX ADMIN — SODIUM CHLORIDE 500 ML: 9 INJECTION, SOLUTION INTRAVENOUS at 02:04

## 2024-04-21 RX ADMIN — HYDRALAZINE HYDROCHLORIDE 75 MG: 50 TABLET ORAL at 05:04

## 2024-04-21 RX ADMIN — FAMOTIDINE 20 MG: 20 TABLET ORAL at 10:04

## 2024-04-21 RX ADMIN — AMPICILLIN 2 G: 2 INJECTION, POWDER, FOR SOLUTION INTRAMUSCULAR; INTRAVENOUS at 08:04

## 2024-04-21 RX ADMIN — TRAZODONE HYDROCHLORIDE 25 MG: 50 TABLET ORAL at 08:04

## 2024-04-21 RX ADMIN — CEFTRIAXONE 2 G: 2 INJECTION, POWDER, FOR SOLUTION INTRAMUSCULAR; INTRAVENOUS at 07:04

## 2024-04-21 RX ADMIN — HYDRALAZINE HYDROCHLORIDE 75 MG: 50 TABLET ORAL at 02:04

## 2024-04-21 RX ADMIN — MUPIROCIN: 20 OINTMENT TOPICAL at 08:04

## 2024-04-21 RX ADMIN — AMIODARONE HYDROCHLORIDE 400 MG: 200 TABLET ORAL at 10:04

## 2024-04-21 RX ADMIN — CEFTRIAXONE 2 G: 2 INJECTION, POWDER, FOR SOLUTION INTRAMUSCULAR; INTRAVENOUS at 10:04

## 2024-04-21 RX ADMIN — POTASSIUM CHLORIDE 20 MEQ: 1500 TABLET, EXTENDED RELEASE ORAL at 08:04

## 2024-04-21 RX ADMIN — ACETAMINOPHEN 1000 MG: 500 TABLET ORAL at 10:04

## 2024-04-21 RX ADMIN — AMPICILLIN 2 G: 2 INJECTION, POWDER, FOR SOLUTION INTRAMUSCULAR; INTRAVENOUS at 10:04

## 2024-04-21 NOTE — NURSING
Urine out put overnight 70cc, No complaints of discomfort when bladder palpated. Bladder scan done  with volume of 17 cc. MD Parmar made aware - order nephrology consult.

## 2024-04-21 NOTE — NURSING
Received report from Wilmer RN last night. t Pt free of falls and injury.Wife at bedside. Pt complaints of lower back pain - tylenol given with good result. Pt AAOx4. Fall precautions remain in place. LVAD hum present and smooth. VAD numbers and dopplers WDL. DLES dressing CDI. Plan of care reviewed with pt. Will continue to monitor pt.

## 2024-04-21 NOTE — PROGRESS NOTES
04/21/2024  Monet Aguiar    Current provider:  Brayan Ocampo MD    Device interrogation:      4/21/2024     4:30 AM 4/21/2024    12:00 AM 4/20/2024     7:36 PM 4/20/2024     4:46 PM 4/20/2024    12:04 PM 4/20/2024     8:26 AM 4/20/2024     4:54 AM   TXP LVAD INTERROGATIONS   Type HeartMate3 HeartMate3 HeartMate3 HeartMate3 HeartMate3 HeartMate3 HeartMate3   Flow 3.9 3.9 3.4 3.9 3.8 3.8 4   Speed 5050 5000 5000 5000 5000 5000 5000   PI 5.7 5.3 7.5 4.7 6.5 5.7 4.8   Power ([place]) 3.5 3.5 3.5 3.4 3.5 3.5 3.4   LSL 4600 4600 4600 4600 4600 4600    Pulsatility Intermittent pulse   Intermittent pulse Intermittent pulse Intermittent pulse Intermittent pulse          Rounded on [facility] to ensure all mechanical assist device settings (IABP or VAD) were appropriate and all parameters were within limits.  I was able to ensure all back up equipment was present, the staff had no issues, and the Perfusion Department daily rounding was complete.      For implantable VADs: Interrogation of Ventricular assist device was performed with analysis of device parameters and review of device function. I have personally reviewed the interrogation findings and agree with findings as stated.     In emergency, the nursing units have been notified to contact the perfusion department either by:  Calling [id number] from 630am to 4pm Mon thru Fri, utilizing the On-Call Finder functionality of Epic and searching for Perfusion, or by contacting the hospital  from 4pm to 630am and on weekends and asking to speak with the perfusionist on call.    8:17 AM

## 2024-04-21 NOTE — PROGRESS NOTES
[facility] - Cardiology Stepdown  Heart Transplant  Progress Note    Patient Name: Radha Abbott  MRN: [id number]  Admission Date: 4/16/2024  Hospital Length of Stay: 5 days  Attending Physician: Brayan Ocampo MD  Primary Care Provider: Vasu Kong MD  Principal Problem:Pneumonia due to COVID-19 virus    Subjective:   Interval History:   Patient looks much better today and reports he is feeling better.  Gave him some fluids yesterday in the setting of worsening creatinine.  Only had 70cc of Urine output overnight with 15cc in bladder scan.  Nephrology has been consulted: think its ATN from infection.  His creatinine is up to 8.3 today (baseline 3-4).  Will place central line (trialysis) for hemodynamics.  If hemodynamics are poor then will consider transfer to ICU.  Appreciate ID consultation.  Repeat blood cultures from 4/20 are NGTD.  He remains on Ampicillin and Ceftiraxone.     Continuous Infusions:  Current Facility-Administered Medications   Medication Dose Route Frequency Last Rate Last Admin     Scheduled Meds:  Current Facility-Administered Medications   Medication Dose Route Frequency    amiodarone  400 mg Oral Daily    ampicillin IV (PEDS and ADULTS)  2 g Intravenous Q12H    cefTRIAXone (Rocephin) IV (PEDS and ADULTS)  2 g Intravenous Q12H    famotidine  20 mg Oral Daily    hydrALAZINE  75 mg Oral Q8H    magnesium oxide  400 mg Oral Daily    ondansetron  4 mg Intravenous Once    sodium bicarbonate  325 mg Oral TID    traZODone  25 mg Oral QHS    venlafaxine  37.5 mg Oral Daily    warfarin  2.5 mg Oral Once per day on Sunday Monday Wednesday Friday Saturday    And    warfarin  3.75 mg Oral Every Tues, Thurs     PRN Meds:  Current Facility-Administered Medications:     acetaminophen, 1,000 mg, Oral, Q8H PRN    dextrose 10%, 12.5 g, Intravenous, PRN    dextrose 10%, 25 g, Intravenous, PRN    glucagon (human recombinant), 1 mg, Intramuscular, PRN    glucose, 16 g, Oral, PRN    glucose, 24 g, Oral, PRN     insulin aspart U-100, 0-5 Units, Subcutaneous, QID (AC + HS) PRN    senna-docusate 8.6-50 mg, 2 tablet, Oral, Daily PRN    Review of patient's allergies indicates:  No Known Allergies  Objective:     Vital Signs (Most Recent):  Temp: 97.5 °F (36.4 °C) (04/21/24 0758)  Pulse: 87 (04/21/24 0758)  Resp: 16 (04/21/24 0758)  BP: 105/75 (04/21/24 0758)  SpO2: 99 % (04/21/24 0758) Vital Signs (24h Range):  Temp:  [97 °F (36.1 °C)-97.6 °F (36.4 °C)] 97.5 °F (36.4 °C)  Pulse:  [56-87] 87  Resp:  [16-19] 16  SpO2:  [96 %-99 %] 99 %  BP: ()/(0-77) 105/75     Patient Vitals for the past 72 hrs (Last 3 readings):   Weight   04/21/24 0504 76.1 kg (167 lb 12.3 oz)   04/19/24 1102 75.3 kg (166 lb 0.1 oz)   04/18/24 1240 75 kg (165 lb 5.5 oz)     Body mass index is 21.54 kg/m².      Intake/Output Summary (Last 24 hours) at 4/21/2024 1000  Last data filed at 4/21/2024 0559  Gross per 24 hour   Intake 620 ml   Output 70 ml   Net 550 ml       Hemodynamic Parameters:       Telemetry: SR with PVC's       Physical Exam  Constitutional:       Appearance: He is ill-appearing.   HENT:      Head: Normocephalic and atraumatic.   Eyes:      Conjunctiva/sclera: Conjunctivae normal.      Pupils: Pupils are equal, round, and reactive to light.   Neck:      Comments: Do not appreciate elevated JVP  Cardiovascular:      Rate and Rhythm: Normal rate and regular rhythm.      Comments: Smooth VAD hum  Pulmonary:      Effort: Pulmonary effort is normal.      Comments: Breath sounds are decreased right base. Lungs essentially CTA bilaterally  Abdominal:      General: Bowel sounds are normal.      Palpations: Abdomen is soft.   Musculoskeletal:         General: No swelling. Normal range of motion.      Cervical back: Normal range of motion and neck supple.   Skin:     General: Skin is warm and dry.      Capillary Refill: Capillary refill takes 2 to 3 seconds.   Neurological:      General: No focal deficit present.      Mental Status: He is alert  and oriented to person, place, and time.            Significant Labs:  CBC:  Recent Labs   Lab 04/19/24 0530 04/20/24 0234 04/21/24  0640   WBC 8.74 8.54 5.29   RBC 3.41* 3.18* 3.04*   HGB 9.7* 8.9* 8.6*   HCT 29.5* 27.3* 26.3*    333 314   MCV 87 86 87   MCH 28.4 28.0 28.3   MCHC 32.9 32.6 32.7     BNP:  Recent Labs   Lab 04/17/24  0409 04/19/24  0530   * 409*     CMP:  Recent Labs   Lab 04/17/24  0030 04/17/24 0409 04/18/24 0534 04/19/24 0530 04/20/24 0234 04/21/24  0640   * 117*   < > 104 113* 102   CALCIUM 8.9 9.5   < > 9.2 8.9 8.7   ALBUMIN 3.0* 3.2*  --  3.0*  --   --    PROT 7.7 8.2  --  8.2  --   --    * 134*   < > 135* 134* 131*   K 4.0 3.9   < > 3.6 3.7 3.4*   CO2 23 25   < > 24 21* 21*   CL 95 94*   < > 92* 92* 91*   BUN 76* 82*   < > 98* 109* 119*   CREATININE 4.3* 4.3*   < > 6.0* 6.8* 8.3*   ALKPHOS 127 143*  --  139*  --   --    ALT 88* 101*  --  516*  --   --    AST 76* 88*  --  644*  --   --    BILITOT 0.4 0.5  --  0.8  --   --     < > = values in this interval not displayed.      Coagulation:   Recent Labs   Lab 04/19/24 0530 04/20/24 0234 04/21/24  0640   INR 2.2* 2.6* 4.0*   APTT 45.3* 49.8* 56.8*     LDH:  Recent Labs   Lab 04/19/24 0530 04/20/24 0234 04/21/24  0640   LDH 1,048* 591* 419*     Microbiology:  Microbiology Results (last 7 days)       Procedure Component Value Units Date/Time    Blood culture [[id number]] Collected: 04/20/24 0234    Order Status: Completed Specimen: Blood Updated: 04/21/24 0613     Blood Culture, Routine No Growth to date      No Growth to date    Blood culture [[id number]] Collected: 04/20/24 0240    Order Status: Completed Specimen: Blood Updated: 04/21/24 0613     Blood Culture, Routine No Growth to date      No Growth to date    Blood culture [[id number]] Collected: 04/18/24 0535    Order Status: Completed Specimen: Blood Updated: 04/20/24 1012     Blood Culture, Routine No Growth to date      No Growth to date      No Growth  to date    Blood culture [[id number]] Collected: 04/18/24 0534    Order Status: Completed Specimen: Blood Updated: 04/20/24 1012     Blood Culture, Routine No Growth to date      No Growth to date      No Growth to date    Blood culture x two cultures. Draw prior to antibiotics. [[id number]]  (Abnormal) Collected: 04/16/24 2322    Order Status: Completed Specimen: Blood from Peripheral, Hand, Right Updated: 04/19/24 1501     Blood Culture, Routine Gram stain aer bottle: Gram positive cocci      Gram stain nata bottle: Gram positive cocci      Positive results previously called 04/17/2024 15:08      ENTEROCOCCUS FAECALIS  For susceptibility see order #[id number]      Narrative:      Aerobic and anaerobic    Blood culture x two cultures. Draw prior to antibiotics. [[id number]]  (Abnormal)  (Susceptibility) Collected: 04/16/24 2322    Order Status: Completed Specimen: Blood from Peripheral, Hand, Left Updated: 04/19/24 1500     Blood Culture, Routine Gram stain aer bottle: Gram positive cocci      Gram stain nata bottle: Gram positive cocci      Results called to and read back by: Rachael Horan RN 04/17/2024  15:08      ENTEROCOCCUS FAECALIS    Narrative:      Aerobic and anaerobic    Rapid Organism ID by PCR (from Blood culture) [[id number]]  (Abnormal) Collected: 04/16/24 2322    Order Status: Completed Updated: 04/17/24 1726     Enterococcus faecalis Detected     Enterococcus faecium Not Detected     Listeria monocytogenes Not Detected     Staphylococcus spp. Not Detected     Staphylococcus aureus Not Detected     Staphylococcus epidermidis Not Detected     Staphylococcus lugdunensis Not Detected     Streptococcus species Not Detected     Streptococcus agalactiae Not Detected     Streptococcus pneumoniae Not Detected     Streptococcus pyogenes Not Detected     Acinetobacter calcoaceticus/baumannii complex Not Detected     Bacteroides fragilis Not Detected     Enterobacterales Not Detected     Enterobacter cloacae  complex Not Detected     Escherichia coli Not Detected     Klebsiella aerogenes Not Detected     Klebsiella oxytoca Not Detected     Klebsiella pneumoniae group Not Detected     Proteus Not Detected     Salmonella sp Not Detected     Serratia marcescens Not Detected     Haemophilus influenzae Not Detected     Neisseria meningtidis Not Detected     Pseudomonas aeruginosa Not Detected     Stenotrophomonas maltophilia Not Detected     Candida albicans Not Detected     Candida auris Not Detected     Candida glabrata Not Detected     Candida krusei Not Detected     Candida parapsilosis Not Detected     Candida tropicalis Not Detected     Cryptococcus neoformans/gattii Not Detected     CTX-M (ESBL ) Test Not Applicable     IMP (Carbapenem resistant) Test Not Applicable     KPC resistance gene (Carbapenem resistant) Test Not Applicable     mcr-1  Test Not Applicable     mec A/C  Test Not Applicable     mec A/C and MREJ (MRSA) gene Test Not Applicable     NDM (Carbapenem resistant) Test Not Applicable     OXA-48-like (Carbapenem resistant) Test Not Applicable     van A/B (VRE gene) Not Detected     VIM (Carbapenem resistant) Test Not Applicable    Narrative:      Aerobic and anaerobic    Influenza A & B by Molecular [[id number]] Collected: 04/16/24 4086    Order Status: Completed Specimen: Nasopharyngeal Swab Updated: 04/17/24 0059     Influenza A, Molecular Negative     Influenza B, Molecular Negative     Flu A & B Source Nasal swab            I have reviewed all pertinent labs within the past 24 hours.    Estimated Creatinine Clearance: 9.9 mL/min (A) (based on SCr of 8.3 mg/dL (H)).    Diagnostic Results:  I have reviewed and interpreted all pertinent imaging results/findings within the past 24 hours.  Assessment and Plan:     Mr. Radha Abbott is a very pleasant 63 yo male with stage D HFrEF, ICMP with previous admission for ADHF/CS. He did undergo HM3 placement placed by Dr Washington on 12/1/23. Lengthy post op  course complicated by vasoplegia(requiring Giaprezza), debility, malnutrition/impaired swallowing, and renal failure requiring HD(since weaned off). Once medically stable, he was transferred to Rehab for further PT/OT/ST. Of note, pt left with permacath still in place. He has done really well in rehab and now has continued to progress well at home. He is on a very high dose of Bumex 4 mg twice daily. VAD speed is at 5000 rpm. No VAD alarms noted on interrogation occasional PI events . Today he presents to the ED for infectious workup due to complaints of fevers of 101.5F associated with fatigue, generalized weakness, lower back pain and HA. He also reports a decline in appetite. Wife changes his driveline dressing every Saturday and has not noticed any purulance with dressing changes. Body aches/weakness started around 2am last night followed by fevers around 5pm. He has been taking tylenol which has helped brng down fevers but has not helped with HA. HA is 5/10 in intensity. He reports slight increase in body weight/ fluid retention yesterday for which he took a dose of Metolazone with a drop in weight by ~5lbs. Denies any SOB, LE swelling at this time.      2D Echo with CFD done on 3/26/2024  LVAD: There is a Heartmate III LVAD running at 5000 RPM. The aortic valve does not open. The ventricular septum is at midline. Inflow cannula well seated at the apex. Outflow graft not visualized.    Left Ventricle: The left ventricle is moderately dilated. Normal wall thickness. Global hypokinesis present. Septal motion is abnormal. There is severely reduced systolic function with a visually estimated ejection fraction of 5 - 10%. There is diastolic dysfunction but grade cannot be determined.    Right Ventricle: Mild right ventricular enlargement. Wall thickness is normal. Right ventricle wall motion  is normal. Systolic function is normal. Pacemaker lead present in the ventricle.    Left Atrium: Left atrium is severely  dilated.    Right Atrium: Right atrium is moderately dilated.    Mitral Valve: The mitral valve is repaired with an Noble stitch. There is mild regurgitation.    IVC/SVC: Normal venous pressure at 3 mmHg.    * Pneumonia due to COVID-19 virus  -4/16/24 he presented to the ED for infectious workup due to complaints of fevers of 101.5F associated with fatigue, generalized weakness, lower back pain and HA that started this morning. No evidence of drive line infection. No history of line infection.   - COVID +ve  - CT chest/ abdomen / pelvis 4/16 showed new RML opacity  - UA clean, blood cxs from 4/16 +ve for E faecalis  - Vanc and Zosyn started on admit.  Changed to Ampicillin and Ceftriaxone   - Appreciate ID's help. Plan for 3 day course of Remdesivir  - TTE to R/O endocarditis negative,    - continue tylenol for fevers    Bacteremia  -blood cxs from 4/16 +ve for E faecalis   -ID following  -E faecalis is a known cause of endocarditis and AICD lead infections.   -TTE was negative for vegetation.  However TTE does not rule out valvular or lead vegetation. Will treat this as endocarditis with 6 weeks of IV antibiotics followed by suppressive therapy   -Currently on Ampicillin and Ceftriaxone   -Repeat cultures from 4/20 are negative thus far    LVAD (left ventricular assist device) present  -HeartMate 3 Implanted  12/1/2023  as DT  -Cont Coumadin, dosing by PharmD.  Goal INR 2.0-3.0 . Supratherapeutic today.  Will hold coumadin   -Antiplatelets Not on ASA due to low H/H on implant   -LDH is stable overall today. Will continue to monitor daily.  -Speed set at  5000     -Interrogation notable for no events  -ECHO 4/18/24 with speed at 5000 EF: 10-15%, LVEDD: 6.1cm, RV size normal function mod reduced   -RHC 1/22/24 with RA 13, PCWP 22, PA 50/25, CO 5.8, CI 3.0   -Not listed for OHTx, declined for OHTX due to comorbidities     Procedure: Device Interrogation Including analysis of device parameters  Current Settings:  Ventricular Assist Device  Review of device function is stable/unstable stable        4/21/2024     4:30 AM 4/21/2024    12:00 AM 4/20/2024     7:36 PM 4/20/2024     4:46 PM 4/20/2024    12:04 PM 4/20/2024     8:26 AM 4/20/2024     4:54 AM   TXP LVAD INTERROGATIONS   Type HeartMate3 HeartMate3 HeartMate3 HeartMate3 HeartMate3 HeartMate3 HeartMate3   Flow 3.9 3.9 3.4 3.9 3.8 3.8 4   Speed 5050 5000 5000 5000 5000 5000 5000   PI 5.7 5.3 7.5 4.7 6.5 5.7 4.8   Power ([place]) 3.5 3.5 3.5 3.4 3.5 3.5 3.4   LSL 4600 4600 4600 4600 4600 4600    Pulsatility Intermittent pulse   Intermittent pulse Intermittent pulse Intermittent pulse Intermittent pulse         Hyponatremia  -POA  -Daily labs       IVÁN (acute kidney injury)  -on CKD IV  -sCr has bumped from 4.3 to 8.3 (baseline 3's - 4's).   -Held Bumex and gave IV fluids with no improvement  -Likely ATN  -Will place trialysis line  -Nephrology consult     Chronic combined systolic and diastolic heart failure  -ICM  -Last 2D Echo  4/18/24 : LVEF 10-15%, LVEDD  6.1 cm  -Euvolemic on examination today  -Was on Bumex 4mg BID at home.  Holding due to IVÁN   -GDMT with Hydralazine  -2g Na dietary restriction, 1500 mL fluid restriction, strict I/Os      Ventricular tachycardia  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    Type 2 diabetes mellitus without complication, without long-term current use of insulin  -SSI    Hypertension  Continue hydralazine    Headache  C/o of HA on admit with no improvement despite tylenol  - CTH done 4/16 with no acute processes        DENIZ Faye  Heart Transplant  [facility] - Cardiology Stepdown

## 2024-04-21 NOTE — CONSULTS
[facility] - Cardiology Stepdown  Nephrology  Consult Note    Patient Name: Radha Abbott  MRN: [id number]  Admission Date: 4/16/2024  Hospital Length of Stay: 5 days  Attending Provider: Brayan Ocampo MD   Primary Care Physician: Vasu Kong MD  Principal Problem:Pneumonia due to COVID-19 virus    Inpatient consult to Nephrology  Consult performed by: Fran White DO  Consult ordered by: Bernardino Green MD        Subjective:     HPI:  63 yo male with stage D HFrEF, ICMP with previous admission for ADHF/CS. S/p LVAD placement 12/2023 Lengthy post op course complicated by vasoplegia(requiring Giaprezza), debility, malnutrition/impaired swallowing, and renal failure requiring HD(since weaned off). presents to the ED for infectious workup due to complaints of fevers of 101.5F. Patient receiving abx, for presumed endocarditis/bactermia. Hospital course complicated by covid positive and oliguric IVÁN.     Concerning renal hx, [place] [facility] [place] nephrology clinic. Baseline Scr ~4 with CKD IV/V, poor reserve.     Nephrology consulted for IVÁN    Past Medical History:   Diagnosis Date    CAD (coronary artery disease)     CHF (congestive heart failure)     Diabetes mellitus     HFrEF (heart failure with reduced ejection fraction)     ICD (implantable cardioverter-defibrillator) in place     MI, old        Past Surgical History:   Procedure Laterality Date    ANGIOPLASTY-VENOUS ARTERY Right 12/1/2023    Procedure: ANGIOPLASTY-VENOUS ARTERY, RIGHT FEMORAL;  Surgeon: Yuri Washington MD;  Location: [address];  Service: Cardiovascular;  Laterality: Right;    AORTIC VALVULOPLASTY N/A 12/1/2023    Procedure: REPAIR, AORTIC VALVE;  Surgeon: Yuri Washington MD;  Location: [facility] OR [address] [place] [address];  Service: Cardiovascular;  Laterality: N/A;    CARDIAC SURGERY      CLOSURE N/A 12/1/2023    Procedure: CLOSURE, TEMPORARY;  Surgeon: Yuri Washington MD;  Location: [facility] OR [address] [place] [address];  Service:  Cardiovascular;  Laterality: N/A;    DRAINAGE OF PLEURAL EFFUSION  12/4/2023    Procedure: DRAINAGE, PLEURAL EFFUSION;  Surgeon: Yuri Washington MD;  [facility];  Service: Cardiovascular;;    INSERTION OF GRAFT TO PERICARDIUM  12/4/2023    Procedure: INSERTION, GRAFT, PERICARDIUM;  Surgeon: Yuri Washington MD;  [facility];  Service: Cardiovascular;;    INSERTION OF INTRA-AORTIC BALLOON ASSIST DEVICE Right 11/21/2023    Procedure: INSERTION, INTRA-AORTIC BALLOON PUMP;  Surgeon: Finn Cohn MD;  Location: [facility] CATH LAB;  Service: Cardiology;  Laterality: Right;    LEFT VENTRICULAR ASSIST DEVICE Left 12/1/2023    Procedure: INSERTION-LEFT VENTRICULAR ASSIST DEVICE;  Surgeon: Yuri Washington MD;  [facility];  Service: Cardiovascular;  Laterality: Left;  REDO STERNOTOMY - REDO SAW NEEDED FOR CASE    LYSIS OF ADHESIONS  12/1/2023    Procedure: LYSIS, ADHESIONS;  Surgeon: Yuri Washington MD;  [facility];  Service: Cardiovascular;;    PLACEMENT OF SWAN ROLANDO CATHETER WITH IMAGING GUIDANCE  11/20/2023    Procedure: INSERTION, CATHETER, SWAN-ROLANDO, WITH IMAGING GUIDANCE;  Surgeon: Sajan Hurley MD;  Location: [facility] CATH LAB;  Service: Cardiology;;    REMOVAL OF TUNNELED CENTRAL VENOUS CATHETER (CVC) N/A 3/1/2024    Procedure: REMOVAL, CATHETER, CENTRAL VENOUS, TUNNELED;  Surgeon: Seble Aguilar MD;  Location: [facility] CATH LAB;  Service: Interventional Nephrology;  Laterality: N/A;    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 12/1/2023    Procedure: REPAIR, ANEURYSM, ARTERY, FEMORAL;  Surgeon: Yuri Washington MD;  [facility];  Service: Cardiovascular;  Laterality: Right;  Right Femoral Artery Repair    RIGHT HEART CATHETERIZATION Right 10/10/2023    Procedure: INSERTION, CATHETER, RIGHT HEART;  Surgeon: Bin Gandhi MD;  Location: [facility] CATH LAB;  Service: Cardiology;  Laterality: Right;    RIGHT HEART CATHETERIZATION Right 10/13/2023     Procedure: INSERTION, CATHETER, RIGHT HEART;  Surgeon: Walter Mcintyre MD;  Location: [facility] CATH LAB;  Service: Cardiology;  Laterality: Right;    RIGHT HEART CATHETERIZATION  11/13/2023    RIGHT HEART CATHETERIZATION Right 11/13/2023    Procedure: INSERTION, CATHETER, RIGHT HEART;  Surgeon: Juventino Bermudez Jr., MD;  Location: [facility] CATH LAB;  Service: Cardiology;  Laterality: Right;    RIGHT HEART CATHETERIZATION Right 11/20/2023    Procedure: INSERTION, CATHETER, RIGHT HEART;  Surgeon: Sajan Hurley MD;  Location: [facility] CATH LAB;  Service: Cardiology;  Laterality: Right;    RIGHT HEART CATHETERIZATION Right 1/22/2024    Procedure: INSERTION, CATHETER, RIGHT HEART;  Surgeon: Brayan Ocampo MD;  Location: [facility] CATH LAB;  Service: Cardiology;  Laterality: Right;    STERNAL WOUND CLOSURE N/A 12/4/2023    Procedure: CLOSURE, WOUND, STERNUM;  Surgeon: Yuri Washington MD;  Location: [facility] OR [facility];  Service: Cardiovascular;  Laterality: N/A;    STERNOTOMY N/A 12/1/2023    Procedure: STERNOTOMY, REDO;  Surgeon: Yuri Washington MD;  Location: [facility] OR [facility] FLR;  Service: Cardiovascular;  Laterality: N/A;    VALVULOPLASTY, MITRAL VALVE N/A 12/1/2023    Procedure: VALVULOPLASTY, MITRAL VALVE;  Surgeon: Yuri Washington MD;  Location: [facility] OR [facility];  Service: Cardiovascular;  Laterality: N/A;       Review of patient's allergies indicates:  No Known Allergies  Current Facility-Administered Medications   Medication Dose Route Frequency Provider Last Rate Last Admin    acetaminophen tablet 1,000 mg  1,000 mg Oral Q8H PRN Lucy Brand MD   1,000 mg at 04/21/24 1036    amiodarone tablet 400 mg  400 mg Oral Daily Lucy Brand MD   400 mg at 04/21/24 1012    ampicillin (OMNIPEN) 2 g in sodium chloride 0.9 % 100 mL IVPB (MB+)  2 g Intravenous Q12H Axel Caban MD   Stopped at 04/21/24 1156    cefTRIAXone (ROCEPHIN) 2 g in dextrose 5 % in water (D5W) 100 mL IVPB (MB+)  2 g Intravenous Q12H Nnedu,  Axel LUNA MD   Stopped at 04/21/24 1049    dextrose 10% bolus 125 mL 125 mL  12.5 g Intravenous PRN Erasmo Parrish DNP, FNP        dextrose 10% bolus 250 mL 250 mL  25 g Intravenous PRN Erasmo Parrish DNP, FNP        famotidine tablet 20 mg  20 mg Oral Daily Lucy Brand MD   20 mg at 04/21/24 1012    glucagon (human recombinant) injection 1 mg  1 mg Intramuscular PRN Erasmo Parrish DNP, FNP        glucose chewable tablet 16 g  16 g Oral PRN Erasmo Parrish DNP, FNP        glucose chewable tablet 24 g  24 g Oral PRErasmo George DNP, FNP        hydrALAZINE tablet 75 mg  75 mg Oral Q8H Lucy Brand MD   75 mg at 04/21/24 0507    insulin aspart U-100 pen 0-5 Units  0-5 Units Subcutaneous QID (AC + HS) Erasmo Lopez DNP, FNP        magnesium oxide tablet 400 mg  400 mg Oral Daily Lucy Brand MD   400 mg at 04/21/24 1012    ondansetron injection 4 mg  4 mg Intravenous Once Ric Lara MD        senna-docusate 8.6-50 mg per tablet 2 tablet  2 tablet Oral Daily PRN Lucy Brand MD        sodium bicarbonate tablet 325 mg  325 mg Oral TID Lucy Brand MD   325 mg at 04/21/24 1012    trazodone split tablet 25 mg  25 mg Oral QHS Lucy Brand MD   25 mg at 04/20/24 2123    venlafaxine tablet 37.5 mg  37.5 mg Oral Daily Lucy Brand MD   37.5 mg at 04/21/24 1025     Family History    None       Tobacco Use    Smoking status: Former     Current packs/day: 0.50     Types: Cigarettes    Smokeless tobacco: Not on file   Substance and Sexual Activity    Alcohol use: Yes     Comment: rarely    Drug use: No    Sexual activity: Not Currently     Partners: Female     Review of Systems  Objective:     Vital Signs (Most Recent):  Temp: 98.4 °F (36.9 °C) (04/21/24 1249)  Pulse: 84 (04/21/24 1046)  Resp: 16 (04/21/24 0758)  BP: (!) 80/0 (04/21/24 1201)  SpO2: 99 % (04/21/24 0758) Vital Signs (24h Range):  Temp:  [97 °F (36.1 °C)-98.4 °F  (36.9 °C)] 98.4 °F (36.9 °C)  Pulse:  [56-87] 84  Resp:  [16-19] 16  SpO2:  [96 %-99 %] 99 %  BP: ()/(0-77) 80/0     Weight: 76.1 kg (167 lb 12.3 oz) (04/21/24 0504)  Body mass index is 21.54 kg/m².  Body surface area is 1.99 meters squared.    I/O last 3 completed shifts:  In: 800 [P.O.:600; IV Piggyback:200]  Out: 70 [Urine:70]     Physical Exam  Constitutional:       Appearance: He is ill-appearing.   HENT:      Head: Normocephalic and atraumatic.   Eyes:      Conjunctiva/sclera: Conjunctivae normal.      Pupils: Pupils are equal, round, and reactive to light.   Neck:      Comments: Do not appreciate elevated JVP  Cardiovascular:      Rate and Rhythm: Normal rate and regular rhythm.      Comments: Smooth VAD hum  Pulmonary:      Effort: Pulmonary effort is normal.      Comments: Breath sounds are decreased right base. Lungs essentially CTA bilaterally  Abdominal:      General: Bowel sounds are normal.      Palpations: Abdomen is soft.   Musculoskeletal:         General: No swelling. Normal range of motion.      Cervical back: Normal range of motion and neck supple.   Skin:     General: Skin is warm and dry.      Capillary Refill: Capillary refill takes 2 to 3 seconds.   Neurological:      General: No focal deficit present.      Mental Status: He is alert and oriented to person, place, and time.          Significant Labs:  All labs within the past 24 hours have been reviewed.    Significant Imaging:  Labs: Reviewed  Assessment/Plan:     Renal/  IVÁN (acute kidney injury)  Anuric IVÁN on baseline CKD IV  Baseline Scr ~4; Scr at time of consult ~8.3  UA Trace protein; bland otherwise.   UPCR 0.61g  Renal US: not avaialbe. In 2023 showed MRD.  Suspect ATN given rapid increase in Scr and anuria. Patient with prior CKD V poor reserve. Chart reviewed in depth, no inciting cause of IVÁN (normal vanc troughs, no obvious nephrotoxins, normal CPK). Suspect insult occurred in setting of COVID/bacteremia.   Plan;  -  Patient appears dry on exam, recommend LR @ 100cc/hr for 5 hrs.   - s/p temp dialysis line by primary team.   - no urgent indications for RRT at this time.   - obtain RFP BID  - bladder scan, insert goodman if evidence of retention  - obtain retroperitoneal US.   - renally dose all medications  - follow up lactic acid levels (if elevated would evaluate for cardiogenic shock).   - avoid nephrotoxins as much as possible.     ID  Bacteremia  Per primary team.         Thank you for your consult. I will follow-up with patient. Please contact us if you have any additional questions.    Case discussed with attending. Attestation to follow.       Fran White DO  Nephrology  [facility] - Cardiology Stepdown

## 2024-04-21 NOTE — PLAN OF CARE
Problem: Diabetes Comorbidity  Goal: Blood Glucose Level Within Targeted Range  Outcome: Ongoing, Progressing  Intervention: Monitor and Manage Glycemia  Flowsheets (Taken 4/21/2024 0114)  Glycemic Management: blood glucose monitored

## 2024-04-21 NOTE — HPI
65 yo male with stage D HFrEF, ICMP with previous admission for ADHF/CS. S/p LVAD placement 12/2023 Lengthy post op course complicated by vasoplegia(requiring Giaprezza), debility, malnutrition/impaired swallowing, and renal failure requiring HD(since weaned off). presents to the ED for infectious workup due to complaints of fevers of 101.5F. Patient receiving abx, for presumed endocarditis/bactermia. Hospital course complicated by covid positive and oliguric IVÁN.     Concerning renal hx, patient [place] [facility] [place]. Mendez in nephrology clinic. Baseline Scr ~4 with CKD IV/V, poor reserve.     Nephrology consulted for IVÁN

## 2024-04-21 NOTE — SUBJECTIVE & OBJECTIVE
Interval History:   Patient looks much better today and reports he is feeling better.  Gave him some fluids yesterday in the setting of worsening creatinine.  Only had 70cc of Urine output overnight with 15cc in bladder scan.  Nephrology has been consulted: think its ATN from infection.  His creatinine is up to 8.3 today (baseline 3-4).  Will place central line (trialysis) for hemodynamics.  If hemodynamics are poor then will consider transfer to ICU.  Appreciate ID consultation.  Repeat blood cultures from 4/20 are NGTD.  He remains on Ampicillin and Ceftiraxone.     Continuous Infusions:  Current Facility-Administered Medications   Medication Dose Route Frequency Last Rate Last Admin     Scheduled Meds:  Current Facility-Administered Medications   Medication Dose Route Frequency    amiodarone  400 mg Oral Daily    ampicillin IV (PEDS and ADULTS)  2 g Intravenous Q12H    cefTRIAXone (Rocephin) IV (PEDS and ADULTS)  2 g Intravenous Q12H    famotidine  20 mg Oral Daily    hydrALAZINE  75 mg Oral Q8H    magnesium oxide  400 mg Oral Daily    ondansetron  4 mg Intravenous Once    sodium bicarbonate  325 mg Oral TID    traZODone  25 mg Oral QHS    venlafaxine  37.5 mg Oral Daily    warfarin  2.5 mg Oral Once per day on Sunday Monday Wednesday Friday Saturday    And    warfarin  3.75 mg Oral Every Tues, Thurs     PRN Meds:  Current Facility-Administered Medications:     acetaminophen, 1,000 mg, Oral, Q8H PRN    dextrose 10%, 12.5 g, Intravenous, PRN    dextrose 10%, 25 g, Intravenous, PRN    glucagon (human recombinant), 1 mg, Intramuscular, PRN    glucose, 16 g, Oral, PRN    glucose, 24 g, Oral, PRN    insulin aspart U-100, 0-5 Units, Subcutaneous, QID (AC + HS) PRN    senna-docusate 8.6-50 mg, 2 tablet, Oral, Daily PRN    Review of patient's allergies indicates:  No Known Allergies  Objective:     Vital Signs (Most Recent):  Temp: 97.5 °F (36.4 °C) (04/21/24 0758)  Pulse: 87 (04/21/24 0758)  Resp: 16 (04/21/24  0758)  BP: 105/75 (04/21/24 0758)  SpO2: 99 % (04/21/24 0758) Vital Signs (24h Range):  Temp:  [97 °F (36.1 °C)-97.6 °F (36.4 °C)] 97.5 °F (36.4 °C)  Pulse:  [56-87] 87  Resp:  [16-19] 16  SpO2:  [96 %-99 %] 99 %  BP: ()/(0-77) 105/75     Patient Vitals for the past 72 hrs (Last 3 readings):   Weight   04/21/24 0504 76.1 kg (167 lb 12.3 oz)   04/19/24 1102 75.3 kg (166 lb 0.1 oz)   04/18/24 1240 75 kg (165 lb 5.5 oz)     Body mass index is 21.54 kg/m².      Intake/Output Summary (Last 24 hours) at 4/21/2024 1000  Last data filed at 4/21/2024 0559  Gross per 24 hour   Intake 620 ml   Output 70 ml   Net 550 ml       Hemodynamic Parameters:       Telemetry: SR with PVC's       Physical Exam  Constitutional:       Appearance: He is ill-appearing.   HENT:      Head: Normocephalic and atraumatic.   Eyes:      Conjunctiva/sclera: Conjunctivae normal.      Pupils: Pupils are equal, round, and reactive to light.   Neck:      Comments: Do not appreciate elevated JVP  Cardiovascular:      Rate and Rhythm: Normal rate and regular rhythm.      Comments: Smooth VAD hum  Pulmonary:      Effort: Pulmonary effort is normal.      Comments: Breath sounds are decreased right base. Lungs essentially CTA bilaterally  Abdominal:      General: Bowel sounds are normal.      Palpations: Abdomen is soft.   Musculoskeletal:         General: No swelling. Normal range of motion.      Cervical back: Normal range of motion and neck supple.   Skin:     General: Skin is warm and dry.      Capillary Refill: Capillary refill takes 2 to 3 seconds.   Neurological:      General: No focal deficit present.      Mental Status: He is alert and oriented to person, place, and time.            Significant Labs:  CBC:  Recent Labs   Lab 04/19/24  0530 04/20/24  0234 04/21/24  0640   WBC 8.74 8.54 5.29   RBC 3.41* 3.18* 3.04*   HGB 9.7* 8.9* 8.6*   HCT 29.5* 27.3* 26.3*    333 314   MCV 87 86 87   MCH 28.4 28.0 28.3   MCHC 32.9 32.6 32.7      BNP:  Recent Labs   Lab 04/17/24  0409 04/19/24  0530   * 409*     CMP:  Recent Labs   Lab 04/17/24  0030 04/17/24  0409 04/18/24  0534 04/19/24  0530 04/20/24 0234 04/21/24  0640   * 117*   < > 104 113* 102   CALCIUM 8.9 9.5   < > 9.2 8.9 8.7   ALBUMIN 3.0* 3.2*  --  3.0*  --   --    PROT 7.7 8.2  --  8.2  --   --    * 134*   < > 135* 134* 131*   K 4.0 3.9   < > 3.6 3.7 3.4*   CO2 23 25   < > 24 21* 21*   CL 95 94*   < > 92* 92* 91*   BUN 76* 82*   < > 98* 109* 119*   CREATININE 4.3* 4.3*   < > 6.0* 6.8* 8.3*   ALKPHOS 127 143*  --  139*  --   --    ALT 88* 101*  --  516*  --   --    AST 76* 88*  --  644*  --   --    BILITOT 0.4 0.5  --  0.8  --   --     < > = values in this interval not displayed.      Coagulation:   Recent Labs   Lab 04/19/24 0530 04/20/24 0234 04/21/24  0640   INR 2.2* 2.6* 4.0*   APTT 45.3* 49.8* 56.8*     LDH:  Recent Labs   Lab 04/19/24 0530 04/20/24 0234 04/21/24  0640   LDH 1,048* 591* 419*     Microbiology:  Microbiology Results (last 7 days)       Procedure Component Value Units Date/Time    Blood culture [[id number]] Collected: 04/20/24 0234    Order Status: Completed Specimen: Blood Updated: 04/21/24 0613     Blood Culture, Routine No Growth to date      No Growth to date    Blood culture [[id number]] Collected: 04/20/24 0240    Order Status: Completed Specimen: Blood Updated: 04/21/24 0613     Blood Culture, Routine No Growth to date      No Growth to date    Blood culture [[id number]] Collected: 04/18/24 0535    Order Status: Completed Specimen: Blood Updated: 04/20/24 1012     Blood Culture, Routine No Growth to date      No Growth to date      No Growth to date    Blood culture [[id number]] Collected: 04/18/24 0534    Order Status: Completed Specimen: Blood Updated: 04/20/24 1012     Blood Culture, Routine No Growth to date      No Growth to date      No Growth to date    Blood culture x two cultures. Draw prior to antibiotics. [[id number]]  (Abnormal)  Collected: 04/16/24 2322    Order Status: Completed Specimen: Blood from Peripheral, Hand, Right Updated: 04/19/24 1501     Blood Culture, Routine Gram stain aer bottle: Gram positive cocci      Gram stain nata bottle: Gram positive cocci      Positive results previously called 04/17/2024 15:08      ENTEROCOCCUS FAECALIS  For susceptibility see order #[id number]      Narrative:      Aerobic and anaerobic    Blood culture x two cultures. Draw prior to antibiotics. [[id number]]  (Abnormal)  (Susceptibility) Collected: 04/16/24 2322    Order Status: Completed Specimen: Blood from Peripheral, Hand, Left Updated: 04/19/24 1500     Blood Culture, Routine Gram stain aer bottle: Gram positive cocci      Gram stain nata bottle: Gram positive cocci      Results called to and read back by: Rachael Horan RN 04/17/2024  15:08      ENTEROCOCCUS FAECALIS    Narrative:      Aerobic and anaerobic    Rapid Organism ID by PCR (from Blood culture) [[id number]]  (Abnormal) Collected: 04/16/24 2322    Order Status: Completed Updated: 04/17/24 1726     Enterococcus faecalis Detected     Enterococcus faecium Not Detected     Listeria monocytogenes Not Detected     Staphylococcus spp. Not Detected     Staphylococcus aureus Not Detected     Staphylococcus epidermidis Not Detected     Staphylococcus lugdunensis Not Detected     Streptococcus species Not Detected     Streptococcus agalactiae Not Detected     Streptococcus pneumoniae Not Detected     Streptococcus pyogenes Not Detected     Acinetobacter calcoaceticus/baumannii complex Not Detected     Bacteroides fragilis Not Detected     Enterobacterales Not Detected     Enterobacter cloacae complex Not Detected     Escherichia coli Not Detected     Klebsiella aerogenes Not Detected     Klebsiella oxytoca Not Detected     Klebsiella pneumoniae group Not Detected     Proteus Not Detected     Salmonella sp Not Detected     Serratia marcescens Not Detected     Haemophilus influenzae Not Detected      Neisseria meningtidis Not Detected     Pseudomonas aeruginosa Not Detected     Stenotrophomonas maltophilia Not Detected     Candida albicans Not Detected     Candida auris Not Detected     Candida glabrata Not Detected     Candida krusei Not Detected     Candida parapsilosis Not Detected     Candida tropicalis Not Detected     Cryptococcus neoformans/gattii Not Detected     CTX-M (ESBL ) Test Not Applicable     IMP (Carbapenem resistant) Test Not Applicable     KPC resistance gene (Carbapenem resistant) Test Not Applicable     mcr-1  Test Not Applicable     mec A/C  Test Not Applicable     mec A/C and MREJ (MRSA) gene Test Not Applicable     NDM (Carbapenem resistant) Test Not Applicable     OXA-48-like (Carbapenem resistant) Test Not Applicable     van A/B (VRE gene) Not Detected     VIM (Carbapenem resistant) Test Not Applicable    Narrative:      Aerobic and anaerobic    Influenza A & B by Molecular [[id number]] Collected: 04/16/24 2347    Order Status: Completed Specimen: Nasopharyngeal Swab Updated: 04/17/24 0059     Influenza A, Molecular Negative     Influenza B, Molecular Negative     Flu A & B Source Nasal swab            I have reviewed all pertinent labs within the past 24 hours.    Estimated Creatinine Clearance: 9.9 mL/min (A) (based on SCr of 8.3 mg/dL (H)).    Diagnostic Results:  I have reviewed and interpreted all pertinent imaging results/findings within the past 24 hours.

## 2024-04-21 NOTE — PROGRESS NOTES
[facility] - Cardiology Stepdown  Endocrinology  Progress Note    Admit Date: 4/16/2024     Reason for Consult: Management of T2DM, Hyperglycemia      Surgical Procedure and Date: n/a     Diabetes diagnosis year: 1998     Home Diabetes Medications:  Patient has not been taking any medications for diabetes since October 2023    How often checking glucose at home? Checking infrequently   BG readings on regimen: 120-150's  Hypoglycemia on the regimen?  No  Missed doses on regimen?  n/a     Diabetes Complications include:     Hyperglycemia     Complicating diabetes co morbidities:   CAD s/p CABG, HTN, HLD        HPI: Mr. Radha Abbott is a very pleasant 65 yo male with stage D HFrEF, ICMP with previous admission for ADHF/CS. He did undergo HM3 placement placed by Dr Washington on 12/1/23. Lengthy post op course complicated by vasoplegia(requiring Giaprezza), debility, malnutrition/impaired swallowing, and renal failure requiring HD(since weaned off). Once medically stable, he was transferred to Rehab for further PT/OT/ST. Of note, pt left with permacath still in place. He has done really well in rehab and now has continued to progress well at home. He is on a very high dose of Bumex 4 mg twice daily. VAD speed is at 5000 rpm. No VAD alarms noted on interrogation occasional PI events . Today he presents to the ED for infectious workup due to complaints of fevers of 101.5F associated with fatigue, generalized weakness, lower back pain and HA. He also reports a decline in appetite. Wife changes his driveline dressing every Saturday and has not noticed any purulance with dressing changes. Body aches/weakness started around 2am last night followed by fevers around 5pm. He has been taking tylenol which has helped brng down fevers but has not helped with HA. HA is 5/10 in intensity. He reports slight increase in body weight/ fluid retention yesterday for which he took a dose of Metolazone with a drop in weight by ~5lbs. Denies any  "SOB, LE swelling at this time. Endocrine consulted to manage hyperglycemia and type 2 diabetes.     Hemoglobin A1C   Date Value Ref Range Status   2024 7.4 (H) 4.0 - 5.6 % Final     Comment:     ADA Screening Guidelines:  5.7-6.4%  Consistent with prediabetes  >or=6.5%  Consistent with diabetes    High levels of fetal hemoglobin interfere with the HbA1C  assay. Heterozygous hemoglobin variants (HbS, HgC, etc)do  not significantly interfere with this assay.   However, presence of multiple variants may affect accuracy.     2024 5.1 4.0 - 5.6 % Final     Comment:     ADA Screening Guidelines:  5.7-6.4%  Consistent with prediabetes  >or=6.5%  Consistent with diabetes    High levels of fetal hemoglobin interfere with the HbA1C  assay. Heterozygous hemoglobin variants (HbS, HgC, etc)do  not significantly interfere with this assay.   However, presence of multiple variants may affect accuracy.     10/12/2023 7.6 (H) 4.0 - 5.6 % Final     Comment:     ADA Screening Guidelines:  5.7-6.4%  Consistent with prediabetes  >or=6.5%  Consistent with diabetes    High levels of fetal hemoglobin interfere with the HbA1C  assay. Heterozygous hemoglobin variants (HbS, HgC, etc)do  not significantly interfere with this assay.   However, presence of multiple variants may affect accuracy.           Interval HPI:   Overnight events: No acute events overnight. Patient in room 311/311 A. Blood glucose stable. BG at goal on current insulin regimen (SSI ). Steroid use- None.    Renal function- Abnormal - Creatinine 8.3   Vasopressors-  None       Endocrine will continue to follow and manage insulin orders inpatient.       Diet Renal Coumadin Restriction; Fluid - 1500mL; Isolation Tray - Regular [place]     Eatin%  Nausea: No  Hypoglycemia and intervention: No  Fever: No  TPN and/or TF: No    /75 (Patient Position: Sitting)   Pulse 87   Temp 97.5 °F (36.4 °C) (Oral)   Resp 16   Ht 6' 2" (1.88 m)   Wt 76.1 kg (167 lb " "12.3 oz)   SpO2 99%   BMI 21.54 kg/m²     Labs Reviewed and Include    Recent Labs   Lab 04/21/24  0640      CALCIUM 8.7   *   K 3.4*   CO2 21*   CL 91*   *   CREATININE 8.3*     Lab Results   Component Value Date    WBC 5.29 04/21/2024    HGB 8.6 (L) 04/21/2024    HCT 26.3 (L) 04/21/2024    MCV 87 04/21/2024     04/21/2024     No results for input(s): "TSH", "FREET4" in the last 168 hours.  Lab Results   Component Value Date    HGBA1C 7.4 (H) 04/17/2024       Nutritional status:   Body mass index is 21.54 kg/m².  Lab Results   Component Value Date    ALBUMIN 3.0 (L) 04/19/2024    ALBUMIN 3.2 (L) 04/17/2024    ALBUMIN 3.0 (L) 04/17/2024     Lab Results   Component Value Date    PREALBUMIN 24 04/19/2024    PREALBUMIN 20 04/17/2024    PREALBUMIN 34 04/11/2024       Estimated Creatinine Clearance: 9.9 mL/min (A) (based on SCr of 8.3 mg/dL (H)).    Accu-Checks  Recent Labs     04/18/24  2047 04/19/24  0811 04/19/24  1159 04/19/24  1650 04/19/24  2004 04/20/24  0830 04/20/24  1212 04/20/24  1611 04/20/24  2050 04/21/24  0804   POCTGLUCOSE 209* 117* 162* 163* 158* 112* 179* 138* 145* 119*       Current Medications and/or Treatments Impacting Glycemic Control  Immunotherapy:    Immunosuppressants       None          Steroids:   Hormones (From admission, onward)      None          Pressors:    Autonomic Drugs (From admission, onward)      None          Hyperglycemia/Diabetes Medications:   Antihyperglycemics (From admission, onward)      Start     Stop Route Frequency Ordered    04/17/24 0738  insulin aspart U-100 pen 0-5 Units         -- SubQ Before meals & nightly PRN 04/17/24 0638            ASSESSMENT and PLAN    Pulmonary  * Pneumonia due to COVID-19 virus  Infection may elevate BG readings  On IV antibiotics  Managed per primary team  Avoid hypoglycemia        Cardiac/Vascular  LVAD (left ventricular assist device) present  Managed per primary team  Avoid " hypoglycemia        Hypertension  Uncontrolled HTN can worsen insulin resistance.     Endocrine  Type 2 diabetes mellitus without complication, without long-term current use of insulin  Endocrinology consulted for BG management.   BG goal 140-180    - Novolog (Insulin Aspart) prn for BG excursions LDC SSI (150/50)  - BG checks AC/HS  - Hypoglycemia protocol in place    ** Please notify Endocrine for any change and/or advance in diet**  ** Please call Endocrine for any BG related issues **    Discharge Planning:   TBD. Please notify endocrinology prior to discharge.             Erasmo Parrish, DNP, FNP  Endocrinology  [facility] - Cardiology Stepdown

## 2024-04-21 NOTE — SUBJECTIVE & OBJECTIVE
"Interval HPI:   Overnight events: No acute events overnight. Patient in room 311/311 A. Blood glucose stable. BG at goal on current insulin regimen (SSI ). Steroid use- None.    Renal function- Abnormal - Creatinine 8.3   Vasopressors-  None       Endocrine will continue to follow and manage insulin orders inpatient.       Diet Renal Coumadin Restriction; Fluid - 1500mL; Isolation Tray - Regular [place]     Eatin%  Nausea: No  Hypoglycemia and intervention: No  Fever: No  TPN and/or TF: No    /75 (Patient Position: Sitting)   Pulse 87   Temp 97.5 °F (36.4 °C) (Oral)   Resp 16   Ht 6' 2" (1.88 m)   Wt 76.1 kg (167 lb 12.3 oz)   SpO2 99%   BMI 21.54 kg/m²     Labs Reviewed and Include    Recent Labs   Lab 24  0640      CALCIUM 8.7   *   K 3.4*   CO2 21*   CL 91*   *   CREATININE 8.3*     Lab Results   Component Value Date    WBC 5.29 2024    HGB 8.6 (L) 2024    HCT 26.3 (L) 2024    MCV 87 2024     2024     No results for input(s): "TSH", "FREET4" in the last 168 hours.  Lab Results   Component Value Date    HGBA1C 7.4 (H) 2024       Nutritional status:   Body mass index is 21.54 kg/m².  Lab Results   Component Value Date    ALBUMIN 3.0 (L) 2024    ALBUMIN 3.2 (L) 2024    ALBUMIN 3.0 (L) 2024     Lab Results   Component Value Date    PREALBUMIN 24 2024    PREALBUMIN 20 2024    PREALBUMIN 34 2024       Estimated Creatinine Clearance: 9.9 mL/min (A) (based on SCr of 8.3 mg/dL (H)).    Accu-Checks  Recent Labs     24  0811 24  1159 24  1650 24  2004 24  0830 24  1212 24  1611 24  2050 24  0804   POCTGLUCOSE 209* 117* 162* 163* 158* 112* 179* 138* 145* 119*       Current Medications and/or Treatments Impacting Glycemic Control  Immunotherapy:    Immunosuppressants       None          Steroids:   Hormones (From admission, onward)      " None          Pressors:    Autonomic Drugs (From admission, onward)      None          Hyperglycemia/Diabetes Medications:   Antihyperglycemics (From admission, onward)      Start     Stop Route Frequency Ordered    04/17/24 0738  insulin aspart U-100 pen 0-5 Units         -- SubQ Before meals & nightly PRN 04/17/24 0638

## 2024-04-21 NOTE — PROCEDURES
"Radha Abbott is a 64 y.o. male patient.    Temp: 97.5 °F (36.4 °C) (04/21/24 0758)  Pulse: 87 (04/21/24 0758)  Resp: 16 (04/21/24 0758)  BP: 105/75 (04/21/24 0758)  SpO2: 99 % (04/21/24 0758)  Weight: 76.1 kg (167 lb 12.3 oz) (04/21/24 0504)  Height: 6' 2" (188 cm) (04/18/24 1240)    Trialysis Line    Date/Time: 4/21/2024 10:12 AM    Performed by: Javed Vivas MD  Authorized by: Javed Vivas MD    Supervisor Name:  Brayan Otto procedure was performed:  [facility] HEART TRANSPLANT  Consent Done ?:  Yes  Time out complete?: Verified correct patient, procedure, equipment, staff, and site/side    Indications:  Med administration, hemodialysis, vascular access and hemodynamic monitoring  Anesthesia:  Local infiltration  Local anesthetic:  Lidocaine 1% without epinephrine  Anesthetic total (ml):  3  Preparation:  Skin prepped with ChloraPrep  Skin prep agent dried: Skin prep agent completely dried prior to procedure    Sterile barriers: All five maximal sterile barriers used - gloves, gown, cap, mask and large sterile sheet    Hand hygiene: Hand hygiene performed immediately prior to central venous catheter insertion    Location:  Right internal jugular  Catheter type:  Trialysis  Catheter size:  13 Fr  Inserted Catheter Length (cm):  15  Ultrasound guidance: Yes    Vessel Caliber:  Medium   patent  Comprressibility:  Normal  Manometry: Yes    Number of attempts:  1  Securement:  Line sutured, chlorhexidine patch, sterile dressing applied and blood return through all ports  XRay:  Placement verified by x-ray, no pneumothorax on x-ray and successful placement  Adverse Events:  NoneTermination Site: superior vena cava          4/21/2024     4:30 AM 4/21/2024    12:00 AM 4/20/2024     7:36 PM 4/20/2024     4:46 PM 4/20/2024    12:04 PM 4/20/2024     8:26 AM 4/20/2024     4:54 AM   TXP LVAD INTERROGATIONS   Type HeartMate3 HeartMate3 HeartMate3 HeartMate3 HeartMate3 HeartMate3 HeartMate3   Flow 3.9 3.9 3.4 3.9 3.8 3.8 " 4   Speed 5050 5000 5000 5000 5000 5000 5000   PI 5.7 5.3 7.5 4.7 6.5 5.7 4.8   Power ([place]) 3.5 3.5 3.5 3.4 3.5 3.5 3.4   LSL 4600 4600 4600 4600 4600 4600    Pulsatility Intermittent pulse   Intermittent pulse Intermittent pulse Intermittent pulse Intermittent pulse         4/21/2024    Jc Vivas MD  Cardiovascular Medicine; PGY4  [facility]

## 2024-04-21 NOTE — PLAN OF CARE
Outpatient Antibiotic Therapy Plan:    Please send referral to [facility].    1) Infection: E faecalis bacteremia and probable cardiac device infection    2) Discharge Antibiotics:    Intravenous antibiotics:  Ceftriaxone 2 grams IV q 12 hours   Ampicillin 2 grams IV q 12 hours     3) Therapy Duration:  6 weeks    Estimated end date of IV antibiotics: 05/30/24    4) Outpatient Weekly Labs:    Order the following labs to be drawn on Mondays:   CBC  CMP     5) Fax Lab Results to Infectious Diseases Provider: Dr. Caban    [facility] Fax Number: [phone]    6) Outpatient Infectious Diseases Follow-up    Follow-up appointment will be arranged by the ID clinic and will be found in the patient's appointments tab.    Prior to discharge, please ensure the patient's follow-up has been scheduled.    If there is still no follow-up scheduled prior to discharge, please send an EPIC message to Deysi Mckeon in Infectious Diseases.

## 2024-04-21 NOTE — SUBJECTIVE & OBJECTIVE
Past Medical History:   Diagnosis Date    CAD (coronary artery disease)     CHF (congestive heart failure)     Diabetes mellitus     HFrEF (heart failure with reduced ejection fraction)     ICD (implantable cardioverter-defibrillator) in place     MI, old        Past Surgical History:   Procedure Laterality Date    ANGIOPLASTY-VENOUS ARTERY Right 12/1/2023    Procedure: ANGIOPLASTY-VENOUS ARTERY, RIGHT FEMORAL;  Surgeon: Yuri Washington MD;  [facility];  Service: Cardiovascular;  Laterality: Right;    AORTIC VALVULOPLASTY N/A 12/1/2023    Procedure: REPAIR, AORTIC VALVE;  Surgeon: Yuri Washington MD;  [facility];  Service: Cardiovascular;  Laterality: N/A;    CARDIAC SURGERY      CLOSURE N/A 12/1/2023    Procedure: CLOSURE, TEMPORARY;  Surgeon: Yuri Washington MD;  [facility];  Service: Cardiovascular;  Laterality: N/A;    DRAINAGE OF PLEURAL EFFUSION  12/4/2023    Procedure: DRAINAGE, PLEURAL EFFUSION;  Surgeon: Yuri Washington MD;  [facility];  Service: Cardiovascular;;    INSERTION OF GRAFT TO PERICARDIUM  12/4/2023    Procedure: INSERTION, GRAFT, PERICARDIUM;  Surgeon: Yuri Washington MD;  Location: [facility] OR [address] [place] [address];  Service: Cardiovascular;;    INSERTION OF INTRA-AORTIC BALLOON ASSIST DEVICE Right 11/21/2023    Procedure: INSERTION, INTRA-AORTIC BALLOON PUMP;  Surgeon: Finn Cohn MD;  Location: [facility] CATH LAB;  Service: Cardiology;  Laterality: Right;    LEFT VENTRICULAR ASSIST DEVICE Left 12/1/2023    Procedure: INSERTION-LEFT VENTRICULAR ASSIST DEVICE;  Surgeon: Yuri Washington MD;  Location: [facility] OR [address] [place] [address];  Service: Cardiovascular;  Laterality: Left;  REDO STERNOTOMY - REDO SAW NEEDED FOR CASE    LYSIS OF ADHESIONS  12/1/2023    Procedure: LYSIS, ADHESIONS;  Surgeon: Yuri Washington MD;  Location: [facility] OR [address] [place] [address];  Service: Cardiovascular;;    PLACEMENT OF SWAN ROLANDO CATHETER WITH IMAGING GUIDANCE  11/20/2023    Procedure: INSERTION,  CATHETER, SWAN-ROLANDO, WITH IMAGING GUIDANCE;  Surgeon: Sajan Hurley MD;  Location: [facility] CATH LAB;  Service: Cardiology;;    REMOVAL OF TUNNELED CENTRAL VENOUS CATHETER (CVC) N/A 3/1/2024    Procedure: REMOVAL, CATHETER, CENTRAL VENOUS, TUNNELED;  Surgeon: Seble Aguilar MD;  Location: [facility] CATH LAB;  Service: Interventional Nephrology;  Laterality: N/A;    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 12/1/2023    Procedure: REPAIR, ANEURYSM, ARTERY, FEMORAL;  Surgeon: Yuri Washington MD;  [facility];  Service: Cardiovascular;  Laterality: Right;  Right Femoral Artery Repair    RIGHT HEART CATHETERIZATION Right 10/10/2023    Procedure: INSERTION, CATHETER, RIGHT HEART;  Surgeon: Bin Gandhi MD;  Location: [facility] CATH LAB;  Service: Cardiology;  Laterality: Right;    RIGHT HEART CATHETERIZATION Right 10/13/2023    Procedure: INSERTION, CATHETER, RIGHT HEART;  Surgeon: Walter Mcintyre MD;  Location: [facility] CATH LAB;  Service: Cardiology;  Laterality: Right;    RIGHT HEART CATHETERIZATION  11/13/2023    RIGHT HEART CATHETERIZATION Right 11/13/2023    Procedure: INSERTION, CATHETER, RIGHT HEART;  Surgeon: Juventino Bermudez Jr., MD;  Location: [facility] CATH LAB;  Service: Cardiology;  Laterality: Right;    RIGHT HEART CATHETERIZATION Right 11/20/2023    Procedure: INSERTION, CATHETER, RIGHT HEART;  Surgeon: Sajan Hurley MD;  Location: [facility] CATH LAB;  Service: Cardiology;  Laterality: Right;    RIGHT HEART CATHETERIZATION Right 1/22/2024    Procedure: INSERTION, CATHETER, RIGHT HEART;  Surgeon: rBayan Ocampo MD;  Location: [facility] CATH LAB;  Service: Cardiology;  Laterality: Right;    STERNAL WOUND CLOSURE N/A 12/4/2023    Procedure: CLOSURE, WOUND, STERNUM;  Surgeon: Yuri Washington MD;  [facility];  Service: Cardiovascular;  Laterality: N/A;    STERNOTOMY N/A 12/1/2023    Procedure: STERNOTOMY, REDO;  Surgeon: Yuri Washington MD;  [facility];  Service:  Cardiovascular;  Laterality: N/A;    VALVULOPLASTY, MITRAL VALVE N/A 12/1/2023    Procedure: VALVULOPLASTY, MITRAL VALVE;  Surgeon: Yuri Washington MD;  Location: [facility] OR [address] [place] [address];  Service: Cardiovascular;  Laterality: N/A;       Review of patient's allergies indicates:  No Known Allergies  Current Facility-Administered Medications   Medication Dose Route Frequency Provider Last Rate Last Admin    acetaminophen tablet 1,000 mg  1,000 mg Oral Q8H PRN Lucy Brand MD   1,000 mg at 04/21/24 1036    amiodarone tablet 400 mg  400 mg Oral Daily Lucy Brand MD   400 mg at 04/21/24 1012    ampicillin (OMNIPEN) 2 g in sodium chloride 0.9 % 100 mL IVPB (MB+)  2 g Intravenous Q12H Axel Caban MD   Stopped at 04/21/24 1156    cefTRIAXone (ROCEPHIN) 2 g in dextrose 5 % in water (D5W) 100 mL IVPB (MB+)  2 g Intravenous Q12H Axel Caban MD   Stopped at 04/21/24 1049    dextrose 10% bolus 125 mL 125 mL  12.5 g Intravenous PRN Erasmo Parrish DNP, FNP        dextrose 10% bolus 250 mL 250 mL  25 g Intravenous PRN Erasmo Parrish DNP, FNP        famotidine tablet 20 mg  20 mg Oral Daily Lucy Brand MD   20 mg at 04/21/24 1012    glucagon (human recombinant) injection 1 mg  1 mg Intramuscular PRN Erasmo Parrish DNP, FNP        glucose chewable tablet 16 g  16 g Oral PRN Erasmo Parrish DNP, FNP        glucose chewable tablet 24 g  24 g Oral PRN Erasmo Parrish DNP, FNP        hydrALAZINE tablet 75 mg  75 mg Oral Q8H Lucy Brand MD   75 mg at 04/21/24 0507    insulin aspart U-100 pen 0-5 Units  0-5 Units Subcutaneous QID (AC + HS) PRN Erasmo Parrish DNP, FNP        magnesium oxide tablet 400 mg  400 mg Oral Daily Lucy Brand MD   400 mg at 04/21/24 1012    ondansetron injection 4 mg  4 mg Intravenous Once Ric Lara MD        senna-docusate 8.6-50 mg per tablet 2 tablet  2 tablet Oral Daily PRN Lucy Brand MD        sodium bicarbonate  tablet 325 mg  325 mg Oral TID Lucy Brand MD   325 mg at 04/21/24 1012    trazodone split tablet 25 mg  25 mg Oral QHS Lucy Brand MD   25 mg at 04/20/24 2123    venlafaxine tablet 37.5 mg  37.5 mg Oral Daily Lucy Brand MD   37.5 mg at 04/21/24 1025     Family History    None       Tobacco Use    Smoking status: Former     Current packs/day: 0.50     Types: Cigarettes    Smokeless tobacco: Not on file   Substance and Sexual Activity    Alcohol use: Yes     Comment: rarely    Drug use: No    Sexual activity: Not Currently     Partners: Female     Review of Systems  Objective:     Vital Signs (Most Recent):  Temp: 98.4 °F (36.9 °C) (04/21/24 1249)  Pulse: 84 (04/21/24 1046)  Resp: 16 (04/21/24 0758)  BP: (!) 80/0 (04/21/24 1201)  SpO2: 99 % (04/21/24 0758) Vital Signs (24h Range):  Temp:  [97 °F (36.1 °C)-98.4 °F (36.9 °C)] 98.4 °F (36.9 °C)  Pulse:  [56-87] 84  Resp:  [16-19] 16  SpO2:  [96 %-99 %] 99 %  BP: ()/(0-77) 80/0     Weight: 76.1 kg (167 lb 12.3 oz) (04/21/24 0504)  Body mass index is 21.54 kg/m².  Body surface area is 1.99 meters squared.    I/O last 3 completed shifts:  In: 800 [P.O.:600; IV Piggyback:200]  Out: 70 [Urine:70]     Physical Exam  Constitutional:       Appearance: He is ill-appearing.   HENT:      Head: Normocephalic and atraumatic.   Eyes:      Conjunctiva/sclera: Conjunctivae normal.      Pupils: Pupils are equal, round, and reactive to light.   Neck:      Comments: Do not appreciate elevated JVP  Cardiovascular:      Rate and Rhythm: Normal rate and regular rhythm.      Comments: Smooth VAD hum  Pulmonary:      Effort: Pulmonary effort is normal.      Comments: Breath sounds are decreased right base. Lungs essentially CTA bilaterally  Abdominal:      General: Bowel sounds are normal.      Palpations: Abdomen is soft.   Musculoskeletal:         General: No swelling. Normal range of motion.      Cervical back: Normal range of motion and neck supple.    Skin:     General: Skin is warm and dry.      Capillary Refill: Capillary refill takes 2 to 3 seconds.   Neurological:      General: No focal deficit present.      Mental Status: He is alert and oriented to person, place, and time.          Significant Labs:  All labs within the past 24 hours have been reviewed.    Significant Imaging:  Labs: Reviewed

## 2024-04-22 ENCOUNTER — PATIENT MESSAGE (OUTPATIENT)
Dept: TRANSPLANT | Facility: CLINIC | Age: 64
End: 2024-04-22
Payer: MEDICAID

## 2024-04-22 LAB
ALBUMIN SERPL BCP-MCNC: 2.5 G/DL (ref 3.5–5.2)
ALLENS TEST: ABNORMAL
ALP SERPL-CCNC: 154 U/L (ref 55–135)
ALT SERPL W/O P-5'-P-CCNC: 230 U/L (ref 10–44)
ANION GAP SERPL CALC-SCNC: 17 MMOL/L (ref 8–16)
APTT PPP: 68.6 SEC (ref 21–32)
AST SERPL-CCNC: 168 U/L (ref 10–40)
BASOPHILS # BLD AUTO: 0.01 K/UL (ref 0–0.2)
BASOPHILS NFR BLD: 0.2 % (ref 0–1.9)
BILIRUB DIRECT SERPL-MCNC: 0.2 MG/DL (ref 0.1–0.3)
BILIRUB SERPL-MCNC: 0.2 MG/DL (ref 0.1–1)
BNP SERPL-MCNC: 1116 PG/ML (ref 0–99)
BUN SERPL-MCNC: 114 MG/DL (ref 8–23)
CALCIUM SERPL-MCNC: 8.5 MG/DL (ref 8.7–10.5)
CHLORIDE SERPL-SCNC: 91 MMOL/L (ref 95–110)
CO2 SERPL-SCNC: 21 MMOL/L (ref 23–29)
CREAT SERPL-MCNC: 8.6 MG/DL (ref 0.5–1.4)
CRP SERPL-MCNC: 107 MG/L (ref 0–8.2)
DELSYS: ABNORMAL
DIFFERENTIAL METHOD BLD: ABNORMAL
EOSINOPHIL # BLD AUTO: 0 K/UL (ref 0–0.5)
EOSINOPHIL NFR BLD: 0.8 % (ref 0–8)
ERYTHROCYTE [DISTWIDTH] IN BLOOD BY AUTOMATED COUNT: 16.8 % (ref 11.5–14.5)
EST. GFR  (NO RACE VARIABLE): 6.4 ML/MIN/1.73 M^2
GLUCOSE SERPL-MCNC: 105 MG/DL (ref 70–110)
HCO3 UR-SCNC: 25 MMOL/L (ref 24–28)
HCT VFR BLD AUTO: 23.3 % (ref 40–54)
HGB BLD-MCNC: 8.1 G/DL (ref 14–18)
IMM GRANULOCYTES # BLD AUTO: 0.02 K/UL (ref 0–0.04)
IMM GRANULOCYTES NFR BLD AUTO: 0.4 % (ref 0–0.5)
INR PPP: 6.1 (ref 0.8–1.2)
LDH SERPL L TO P-CCNC: 409 U/L (ref 110–260)
LYMPHOCYTES # BLD AUTO: 0.5 K/UL (ref 1–4.8)
LYMPHOCYTES NFR BLD: 9.7 % (ref 18–48)
MAGNESIUM SERPL-MCNC: 2.4 MG/DL (ref 1.6–2.6)
MCH RBC QN AUTO: 28.9 PG (ref 27–31)
MCHC RBC AUTO-ENTMCNC: 34.8 G/DL (ref 32–36)
MCV RBC AUTO: 83 FL (ref 82–98)
MODE: ABNORMAL
MONOCYTES # BLD AUTO: 0.3 K/UL (ref 0.3–1)
MONOCYTES NFR BLD: 6.6 % (ref 4–15)
NEUTROPHILS # BLD AUTO: 4.2 K/UL (ref 1.8–7.7)
NEUTROPHILS NFR BLD: 82.3 % (ref 38–73)
NRBC BLD-RTO: 0 /100 WBC
PCO2 BLDA: 41.7 MMHG (ref 35–45)
PH SMN: 7.39 [PH] (ref 7.35–7.45)
PHOSPHATE SERPL-MCNC: 6.9 MG/DL (ref 2.7–4.5)
PLATELET # BLD AUTO: 310 K/UL (ref 150–450)
PMV BLD AUTO: 10 FL (ref 9.2–12.9)
PO2 BLDA: 36 MMHG (ref 40–60)
POC BE: 0 MMOL/L
POC SATURATED O2: 68 % (ref 95–100)
POC TCO2: 26 MMOL/L (ref 24–29)
POCT GLUCOSE: 118 MG/DL (ref 70–110)
POCT GLUCOSE: 181 MG/DL (ref 70–110)
POCT GLUCOSE: 184 MG/DL (ref 70–110)
POTASSIUM SERPL-SCNC: 3.5 MMOL/L (ref 3.5–5.1)
PREALB SERPL-MCNC: 23 MG/DL (ref 20–43)
PROT SERPL-MCNC: 6.9 G/DL (ref 6–8.4)
PROTHROMBIN TIME: 59.3 SEC (ref 9–12.5)
RBC # BLD AUTO: 2.8 M/UL (ref 4.6–6.2)
SAMPLE: ABNORMAL
SITE: ABNORMAL
SODIUM SERPL-SCNC: 129 MMOL/L (ref 136–145)
SP02: 99
WBC # BLD AUTO: 5.14 K/UL (ref 3.9–12.7)

## 2024-04-22 PROCEDURE — 80048 BASIC METABOLIC PNL TOTAL CA: CPT | Performed by: STUDENT IN AN ORGANIZED HEALTH CARE EDUCATION/TRAINING PROGRAM

## 2024-04-22 PROCEDURE — 83880 ASSAY OF NATRIURETIC PEPTIDE: CPT | Performed by: STUDENT IN AN ORGANIZED HEALTH CARE EDUCATION/TRAINING PROGRAM

## 2024-04-22 PROCEDURE — 82803 BLOOD GASES ANY COMBINATION: CPT

## 2024-04-22 PROCEDURE — 93750 INTERROGATION VAD IN PERSON: CPT | Mod: ,,, | Performed by: INTERNAL MEDICINE

## 2024-04-22 PROCEDURE — 80100014 HC HEMODIALYSIS 1:1

## 2024-04-22 PROCEDURE — 25000003 PHARM REV CODE 250: Performed by: STUDENT IN AN ORGANIZED HEALTH CARE EDUCATION/TRAINING PROGRAM

## 2024-04-22 PROCEDURE — 20600001 HC STEP DOWN PRIVATE ROOM

## 2024-04-22 PROCEDURE — 99233 SBSQ HOSP IP/OBS HIGH 50: CPT | Mod: ,,, | Performed by: INTERNAL MEDICINE

## 2024-04-22 PROCEDURE — 97535 SELF CARE MNGMENT TRAINING: CPT

## 2024-04-22 PROCEDURE — 83735 ASSAY OF MAGNESIUM: CPT | Performed by: STUDENT IN AN ORGANIZED HEALTH CARE EDUCATION/TRAINING PROGRAM

## 2024-04-22 PROCEDURE — 85610 PROTHROMBIN TIME: CPT | Performed by: STUDENT IN AN ORGANIZED HEALTH CARE EDUCATION/TRAINING PROGRAM

## 2024-04-22 PROCEDURE — 63600175 PHARM REV CODE 636 W HCPCS: Performed by: INTERNAL MEDICINE

## 2024-04-22 PROCEDURE — 27000207 HC ISOLATION

## 2024-04-22 PROCEDURE — 85730 THROMBOPLASTIN TIME PARTIAL: CPT | Performed by: STUDENT IN AN ORGANIZED HEALTH CARE EDUCATION/TRAINING PROGRAM

## 2024-04-22 PROCEDURE — 85025 COMPLETE CBC W/AUTO DIFF WBC: CPT | Performed by: STUDENT IN AN ORGANIZED HEALTH CARE EDUCATION/TRAINING PROGRAM

## 2024-04-22 PROCEDURE — 25000003 PHARM REV CODE 250: Performed by: INTERNAL MEDICINE

## 2024-04-22 PROCEDURE — 51798 US URINE CAPACITY MEASURE: CPT

## 2024-04-22 PROCEDURE — 5A1D70Z PERFORMANCE OF URINARY FILTRATION, INTERMITTENT, LESS THAN 6 HOURS PER DAY: ICD-10-PCS | Performed by: INTERNAL MEDICINE

## 2024-04-22 PROCEDURE — 83615 LACTATE (LD) (LDH) ENZYME: CPT | Performed by: STUDENT IN AN ORGANIZED HEALTH CARE EDUCATION/TRAINING PROGRAM

## 2024-04-22 PROCEDURE — 99900035 HC TECH TIME PER 15 MIN (STAT)

## 2024-04-22 PROCEDURE — 86140 C-REACTIVE PROTEIN: CPT | Performed by: STUDENT IN AN ORGANIZED HEALTH CARE EDUCATION/TRAINING PROGRAM

## 2024-04-22 PROCEDURE — 80076 HEPATIC FUNCTION PANEL: CPT | Performed by: STUDENT IN AN ORGANIZED HEALTH CARE EDUCATION/TRAINING PROGRAM

## 2024-04-22 PROCEDURE — 27000248 HC VAD-ADDITIONAL DAY

## 2024-04-22 PROCEDURE — 97116 GAIT TRAINING THERAPY: CPT

## 2024-04-22 PROCEDURE — 84100 ASSAY OF PHOSPHORUS: CPT | Performed by: STUDENT IN AN ORGANIZED HEALTH CARE EDUCATION/TRAINING PROGRAM

## 2024-04-22 PROCEDURE — 84134 ASSAY OF PREALBUMIN: CPT | Performed by: STUDENT IN AN ORGANIZED HEALTH CARE EDUCATION/TRAINING PROGRAM

## 2024-04-22 RX ORDER — POTASSIUM CHLORIDE 20 MEQ/1
40 TABLET, EXTENDED RELEASE ORAL ONCE
Status: COMPLETED | OUTPATIENT
Start: 2024-04-22 | End: 2024-04-22

## 2024-04-22 RX ORDER — SODIUM CHLORIDE 9 MG/ML
INJECTION, SOLUTION INTRAVENOUS
Status: CANCELLED | OUTPATIENT
Start: 2024-04-22

## 2024-04-22 RX ORDER — SODIUM CHLORIDE 9 MG/ML
INJECTION, SOLUTION INTRAVENOUS ONCE
Status: CANCELLED | OUTPATIENT
Start: 2024-04-22 | End: 2024-04-22

## 2024-04-22 RX ADMIN — FAMOTIDINE 20 MG: 20 TABLET ORAL at 09:04

## 2024-04-22 RX ADMIN — HYDRALAZINE HYDROCHLORIDE 75 MG: 50 TABLET ORAL at 09:04

## 2024-04-22 RX ADMIN — AMIODARONE HYDROCHLORIDE 400 MG: 200 TABLET ORAL at 09:04

## 2024-04-22 RX ADMIN — VENLAFAXINE 37.5 MG: 37.5 TABLET ORAL at 09:04

## 2024-04-22 RX ADMIN — POTASSIUM CHLORIDE 40 MEQ: 1500 TABLET, EXTENDED RELEASE ORAL at 09:04

## 2024-04-22 RX ADMIN — AMPICILLIN 2 G: 2 INJECTION, POWDER, FOR SOLUTION INTRAMUSCULAR; INTRAVENOUS at 10:04

## 2024-04-22 RX ADMIN — TRAZODONE HYDROCHLORIDE 25 MG: 50 TABLET ORAL at 09:04

## 2024-04-22 RX ADMIN — CEFTRIAXONE 2 G: 2 INJECTION, POWDER, FOR SOLUTION INTRAMUSCULAR; INTRAVENOUS at 10:04

## 2024-04-22 RX ADMIN — MUPIROCIN: 20 OINTMENT TOPICAL at 09:04

## 2024-04-22 RX ADMIN — SODIUM BICARBONATE 325 MG: 325 TABLET ORAL at 09:04

## 2024-04-22 RX ADMIN — HYDRALAZINE HYDROCHLORIDE 75 MG: 50 TABLET ORAL at 05:04

## 2024-04-22 RX ADMIN — AMPICILLIN 2 G: 2 INJECTION, POWDER, FOR SOLUTION INTRAMUSCULAR; INTRAVENOUS at 09:04

## 2024-04-22 RX ADMIN — GABAPENTIN 100 MG: 100 CAPSULE ORAL at 09:04

## 2024-04-22 RX ADMIN — CEFTRIAXONE 2 G: 2 INJECTION, POWDER, FOR SOLUTION INTRAMUSCULAR; INTRAVENOUS at 09:04

## 2024-04-22 RX ADMIN — Medication 400 MG: at 09:04

## 2024-04-22 NOTE — NURSING
Received patient from [facility]. Pt free of falls and injury. Pt denies any pain or discomfort. Pt AAOx4. Fall precautions remain in place. LVAD hum present and smooth. VAD numbers and dopplers WDL. DLES dressing CDI. Plan of care reviewed with pt. Will continue to monitor pt.

## 2024-04-22 NOTE — PROGRESS NOTES
04/22/24 0626   Critical Value Communication   Date Result Received 04/22/24   Time Result Received 0619   Resulting Department of Critical Value LAB   Critical Test #1 INR   Critical Test #1 Result 6.07   Name of Notified Physician/Designee MR Lara   Date Notified 04/22/24   Time Notified 0621   Read Back Verification Yes   Physician Directive No new order at the moment     No Active bleeding noted.

## 2024-04-22 NOTE — PROGRESS NOTES
04/22/2024  Deni Frye    Current provider:  Brayan Ocampo MD    Device interrogation:      4/22/2024     3:56 AM 4/21/2024    11:44 PM 4/21/2024     7:51 PM 4/21/2024     4:06 PM 4/21/2024    12:01 PM 4/21/2024     7:55 AM 4/21/2024     4:30 AM   TXP LVAD INTERROGATIONS   Type HeartMate3 HeartMate3 HeartMate3 HeartMate3 HeartMate3 HeartMate3 HeartMate3   Flow 3.6 3.7 3.4 3.9 3.9 3.7 3.9   Speed 5000 5000 5050 5050 5000 5050 5050   PI 5.9 6.2 8.2 5.4 4.6 6.5 5.7   Power ([place]) 3.4 3,4 3.5 3.4 3.4 3.5 3.5   LSL 4600 4600 4600    4600   Pulsatility Intermittent pulse Intermittent pulse Intermittent pulse Intermittent pulse Intermittent pulse Intermittent pulse Intermittent pulse          Rounded on [facility] to ensure all mechanical assist device settings (IABP or VAD) were appropriate and all parameters were within limits.  I was able to ensure all back up equipment was present, the staff had no issues, and the Perfusion Department daily rounding was complete.      For implantable VADs: Interrogation of Ventricular assist device was performed with analysis of device parameters and review of device function. I have personally reviewed the interrogation findings and agree with findings as stated.     In emergency, the nursing units have been notified to contact the perfusion department either by:  Calling [phone] from 630am to 4pm Mon thru Fri, utilizing the On-Call Finder functionality of Epic and searching for Perfusion, or by contacting the hospital  from 4pm to 630am and on weekends and asking to speak with the perfusionist on call.    7:34 AM

## 2024-04-22 NOTE — PROGRESS NOTES
04/21/24 2337   Invasive Hemodynamic Monitoring   CVP (mmHg) 10 mmHg   SVO2 (%) 61 %     MD Lara made aware of above result with no new order.

## 2024-04-22 NOTE — PROGRESS NOTES
04/22/24 1805   Post-Hemodialysis Assessment   Rinseback Volume (mL) 250 mL   Blood Volume Processed (Liters) 47.1 L   Dialyzer Clearance Lightly streaked   Duration of Treatment 210 minutes   Additional Fluid Intake (mL) 300 mL   Total UF (mL) 1550 mL   Net Fluid Removal 1000   Patient Response to Treatment tolerated   Post-Hemodialysis Comments see notes     HD TX complete. Pt tolerated well. Net removal 1000 ml. Pt AAO, VSS, report given to primary nurse.

## 2024-04-22 NOTE — PROGRESS NOTES
Update:  SW contacted [facility] (Jacqui) and [facility] (Bessie) with referral.  Orders in EPIC for IV abx and home health nursing.    [facility] [phone]  [facility] [phone]

## 2024-04-22 NOTE — PROGRESS NOTES
1:1 HD started in pt room, Covid precautions maintained.   Report received from primary nurse      VS's per dialysis Flowsheet.     Hemodialysis initiated using the following:     Dialysis Access: RIJ    Will Maintain telemetry and blood pressure monitoring throughout treatment.  Refer to dialysis flowsheet and MAR for details.

## 2024-04-22 NOTE — NURSING
Wife stated patient urinated in bathroom, unable to use urinal. Re educated importance of strict Intake and output monitoring.

## 2024-04-22 NOTE — SUBJECTIVE & OBJECTIVE
Interval History:   UOP 275ml, not received any diuretics, no need of urgent RRT, will closely monitor, CVP latest 8.  Will check physically if concerns for overload/dry, can try lasix otherwise will monitor closely, ATN takes some time for recovery.    Review of patient's allergies indicates:  No Known Allergies  Current Facility-Administered Medications   Medication Dose Route Frequency Provider Last Rate Last Admin    acetaminophen tablet 1,000 mg  1,000 mg Oral Q8H PRN Lucy Brand MD   1,000 mg at 04/21/24 1036    amiodarone tablet 400 mg  400 mg Oral Daily Lucy Brand MD   400 mg at 04/21/24 1012    ampicillin (OMNIPEN) 2 g in sodium chloride 0.9 % 100 mL IVPB (MB+)  2 g Intravenous Q12H Axel Cbaan MD   Stopped at 04/21/24 2155    cefTRIAXone (ROCEPHIN) 2 g in dextrose 5 % in water (D5W) 100 mL IVPB (MB+)  2 g Intravenous Q12H Axel Caban MD   Stopped at 04/21/24 2015    dextrose 10% bolus 125 mL 125 mL  12.5 g Intravenous PRN Erasmo Parrish DNP, FNP        dextrose 10% bolus 250 mL 250 mL  25 g Intravenous PRN Erasmo Parrish DNP, FNP        famotidine tablet 20 mg  20 mg Oral Daily Lucy Brand MD   20 mg at 04/21/24 1012    gabapentin capsule 100 mg  100 mg Oral BID Javed Vivas MD   100 mg at 04/21/24 1633    glucagon (human recombinant) injection 1 mg  1 mg Intramuscular PRN Erasmo Parrish DNP, FNP        glucose chewable tablet 16 g  16 g Oral PRN Erasmo Parrish DNP, FNP        glucose chewable tablet 24 g  24 g Oral PRN Erasmo Parrish DNP, FNP        hydrALAZINE tablet 75 mg  75 mg Oral Q8H Lucy Brand MD   75 mg at 04/22/24 0539    insulin aspart U-100 pen 0-5 Units  0-5 Units Subcutaneous QID (AC + HS) PRN Erasmo Parrish DNP, FNP        magnesium oxide tablet 400 mg  400 mg Oral Daily Lucy Brand MD   400 mg at 04/21/24 1012    mupirocin 2 % ointment   Nasal BID Brayan Ocampo MD   Given at 04/21/24 2040     ondansetron injection 4 mg  4 mg Intravenous Once Ric Lara MD        potassium chloride SA CR tablet 40 mEq  40 mEq Oral Once Angela Shen MD        senna-docusate 8.6-50 mg per tablet 2 tablet  2 tablet Oral Daily PRN Lucy Brand MD        sodium bicarbonate tablet 325 mg  325 mg Oral TID Lucy Brand MD   325 mg at 04/21/24 2048    trazodone split tablet 25 mg  25 mg Oral QHS Lucy Brand MD   25 mg at 04/21/24 2047    venlafaxine tablet 37.5 mg  37.5 mg Oral Daily Lucy Brand MD   37.5 mg at 04/21/24 1025       Objective:     Vital Signs (Most Recent):  Temp: 97.6 °F (36.4 °C) (04/22/24 0358)  Pulse: 76 (04/22/24 0637)  Resp: 18 (04/22/24 0358)  BP: (!) 88/0 (04/22/24 0358)  SpO2: 99 % (04/22/24 0530) Vital Signs (24h Range):  Temp:  [97.3 °F (36.3 °C)-98.4 °F (36.9 °C)] 97.6 °F (36.4 °C)  Pulse:  [76-87] 76  Resp:  [16-18] 18  SpO2:  [99 %] 99 %  BP: ()/(0-82) 88/0     Weight: 77.5 kg (170 lb 13.7 oz) (04/22/24 0522)  Body mass index is 21.94 kg/m².  Body surface area is 2.01 meters squared.    I/O last 3 completed shifts:  In: 1340 [P.O.:1140; IV Piggyback:200]  Out: 345 [Urine:345]     Physical Exam  Constitutional:       Appearance: Normal appearance.   Pulmonary:      Effort: Pulmonary effort is normal. No respiratory distress.   Musculoskeletal:      Right lower leg: No edema.      Left lower leg: No edema.   Neurological:      General: No focal deficit present.      Mental Status: He is alert and oriented to person, place, and time.          Significant Labs:  BMP:   Recent Labs   Lab 04/22/24  0530      *   K 3.5   CL 91*   CO2 21*   *   CREATININE 8.6*   CALCIUM 8.5*   MG 2.4     CBC:   Recent Labs   Lab 04/22/24  0530   WBC 5.14   RBC 2.80*   HGB 8.1*   HCT 23.3*      MCV 83   MCH 28.9   MCHC 34.8        Significant Imaging:  Labs: Reviewed  X-Ray: Reviewed

## 2024-04-22 NOTE — PLAN OF CARE
Impression: S/P Glaucoma surgery with Canaloplasty; with Goniotomy OS - 1 Day. Encounter for surgical aftercare following surgery on a sense organ  Z48.810. Post operative instructions reviewed - Plan: Reviewed post-op instructions. RTC if any pain or sudden changes to vision. Pt A&O x4. Airborne/contact isolation maintained. As per protocol.  Hemodialysis initiated today at bedside.  Pt remains free of injury, falls and trauma.     Problem: Adult Inpatient Plan of Care  Goal: Plan of Care Review  Outcome: Ongoing, Progressing  Goal: Patient-Specific Goal (Individualized)  Outcome: Ongoing, Progressing  Goal: Absence of Hospital-Acquired Illness or Injury  Outcome: Ongoing, Progressing  Goal: Optimal Comfort and Wellbeing  Outcome: Ongoing, Progressing  Goal: Readiness for Transition of Care  Outcome: Ongoing, Progressing     Problem: Diabetes Comorbidity  Goal: Blood Glucose Level Within Targeted Range  Outcome: Ongoing, Progressing     Problem: Adjustment to Illness (Sepsis/Septic Shock)  Goal: Optimal Coping  Outcome: Ongoing, Progressing     Problem: Bleeding (Sepsis/Septic Shock)  Goal: Absence of Bleeding  Outcome: Ongoing, Progressing     Problem: Glycemic Control Impaired (Sepsis/Septic Shock)  Goal: Blood Glucose Level Within Desired Range  Outcome: Ongoing, Progressing     Problem: Infection Progression (Sepsis/Septic Shock)  Goal: Absence of Infection Signs and Symptoms  Outcome: Ongoing, Progressing     Problem: Nutrition Impaired (Sepsis/Septic Shock)  Goal: Optimal Nutrition Intake  Outcome: Ongoing, Progressing     Problem: Skin Injury Risk Increased  Goal: Skin Health and Integrity  Outcome: Ongoing, Progressing     Problem: Fluid Imbalance (Pneumonia)  Goal: Fluid Balance  Outcome: Ongoing, Progressing     Problem: Infection (Pneumonia)  Goal: Resolution of Infection Signs and Symptoms  Outcome: Ongoing, Progressing     Problem: Respiratory Compromise (Pneumonia)  Goal: Effective Oxygenation and Ventilation  Outcome: Ongoing, Progressing     Problem: Fall Injury Risk  Goal: Absence of Fall and Fall-Related Injury  Outcome: Ongoing, Progressing     Patient remains A and O x 4.  Wife at bedside with pt. Pt remains on Isolation (airborne and contact).  Pt has Problem: Fluid  and Electrolyte Imbalance (Acute Kidney Injury/Impairment)  Goal: Fluid and Electrolyte Balance  Outcome: Ongoing, Progressing     Problem: Oral Intake Inadequate (Acute Kidney Injury/Impairment)  Goal: Optimal Nutrition Intake  Outcome: Ongoing, Progressing     Problem: Renal Function Impairment (Acute Kidney Injury/Impairment)  Goal: Effective Renal Function  Outcome: Ongoing, Progressing     Problem: Infection  Goal: Absence of Infection Signs and Symptoms  Outcome: Ongoing, Progressing     Problem: Device-Related Complication Risk (Hemodialysis)  Goal: Safe, Effective Therapy Delivery  Outcome: Ongoing, Progressing     Problem: Hemodynamic Instability (Hemodialysis)  Goal: Effective Tissue Perfusion  Outcome: Ongoing, Progressing     Problem: Infection (Hemodialysis)  Goal: Absence of Infection Signs and Symptoms  Outcome: Ongoing, Progressing

## 2024-04-22 NOTE — PLAN OF CARE
Patient progressing well towards goals. Decrease POC to 3x/wk.   Problem: Physical Therapy  Goal: Physical Therapy Goal  Description: Goals to be met by: 24     Patient will increase functional independence with mobility by performin. Supine to sit with Modified Runnels  2. Sit to stand transfer with Modified Runnels  3. Bed to chair transfer with Modified Runnels using LRAD  4. Gait  x 150 feet with Supervision using LRAD.   5. Lower extremity exercise program x15 reps per handout, with independence    Outcome: Ongoing, Progressing

## 2024-04-22 NOTE — PLAN OF CARE
Recommendations     1.) Recommend continuing with Renal/Coumadin diet, fluid per MD.                  - consider liberalizing diet as renal labs improve, with goal of Cardiac/Coumadin diet.      2.) If PO intake <50%, recommend Boost Plus BID to help meet increased needs.      3.) RD to monitor.      Goals: to meet % of EEN/EPN by next RD f/u  Nutrition Goal Status: progressing towards goal  Communication of RD Recs:  (POC)

## 2024-04-22 NOTE — PROGRESS NOTES
"[facility] - Cardiology Stepdown  Adult Nutrition  Progress Note    SUMMARY       Recommendations    1.) Recommend continuing with Renal/Coumadin diet, fluid per MD.                  - consider liberalizing diet as renal labs improve, with goal of Cardiac/Coumadin diet.      2.) If PO intake <50%, recommend Boost Plus BID to help meet increased needs.      3.) RD to monitor.     Goals: to meet % of EEN/EPN by next RD f/u  Nutrition Goal Status: progressing towards goal  Communication of RD Recs:  (POC)    Assessment and Plan    Nutrition Problem  Increased PRO/Kcal needs     Related to (etiology):   Increased physiological needs     Signs and Symptoms (as evidenced by):   LVAD present/pneumonia      Interventions/Recommendations (treatment strategy):  Collaboration of nutritional care with other providers.      Nutrition Diagnosis Status:   Continues    Reason for Assessment    Reason For Assessment: RD follow-up  Diagnosis: infection/sepsis  Relevant Medical History: CAD, CHF s/p LVAD, DM2  Interdisciplinary Rounds: did not attend    General Information Comments: RD f/u. Pt denies n/c/v/d. Pt endorses good appetite, but does not enjoy the facilities food. Pt's grievances were relayed to dietary staff. Pt is to start HD tx today. NFPE could not be complete d/t Covid 19  infections. Pt states that #, #. RD does suspect some malnutrition. RD to continue to monitor.     Nutrition Discharge Planning: adequate nutritional intake    Nutrition Risk Screen    Nutrition Risk Screen: no indicators present    Nutrition/Diet History    Patient Reported Diet/Restrictions/Preferences: low salt  Typical Food/Fluid Intake: 2 meals + snacks  Spiritual, Cultural Beliefs, Evangelical Practices, Values that Affect Care: no    Anthropometrics    Temp: 97.6 °F (36.4 °C)  Height: 6' 2" (188 cm)  Height (inches): 74 in  Weight Method: Standard Scale  Weight: 77.5 kg (170 lb 13.7 oz)  Weight (lb): 170.86 lb  Ideal Body " Weight (IBW), Male: 190 lb  % Ideal Body Weight, Male (lb): 87.03 %  BMI (Calculated): 21.9    Lab/Procedures/Meds    Pertinent Labs Reviewed: reviewed  Pertinent Labs Comments: INR: 6.1, Na: 129, BUN: 114, cr: 8.6, GFR: 8.6, Ca: 8.5, phos: 6.9, ALP: 154, alb: 2.5, AST: 168, ALT: 230, CRP: 107.0    Pertinent Medications Reviewed: reviewed  Pertinent Medications Comments: Amiodarone, famotidine, hydralazine, MOM, ondansetron, Na Bicarb, abx    Estimated/Assessed Needs    Weight Used For Calorie Calculations: 78 kg (171 lb 15.3 oz)  Energy Calorie Requirements (kcal): 1950- 2340 kcal  Energy Need Method: Kcal/kg (25-30 kcal/kg)    Protein Requirements: 94- 109 g (1.2-1.4g/kg)  Weight Used For Protein Calculations: 78 kg (171 lb 15.3 oz)    Fluid Requirements (mL): 1ml/1kcal or per MD  Estimated Fluid Requirement Method: RDA Method  RDA Method (mL): 1950    CHO Requirement: 244- 293 g    Nutrition Prescription Ordered    Current Diet Order: Renal/Coumadin (1.5L FR)    Evaluation of Received Nutrient/Fluid Intake    I/O: +1.6L since 4/16  Fluid Required:  (as per MD)  Comments: LBM 4/21  Tolerance: tolerating  % Intake of Estimated Energy Needs: 0 - 25 %  % Meal Intake: Other: 50-75% of non-facility foods    Nutrition Risk    Level of Risk/Frequency of Follow-up:  (RD to f/u x 1-2/week)     Monitor and Evaluation    Food and Nutrient Intake: energy intake, food and beverage intake  Food and Nutrient Adminstration: diet order  Knowledge/Beliefs/Attitudes: food and nutrition knowledge/skill  Physical Activity and Function: nutrition-related ADLs and IADLs  Anthropometric Measurements: weight, weight change, body mass index  Biochemical Data, Medical Tests and Procedures: electrolyte and renal panel, gastrointestinal profile, glucose/endocrine profile, inflammatory profile, lipid profile  Nutrition-Focused Physical Findings: overall appearance, skin     Nutrition Follow-Up    RD Follow-up?: Yes

## 2024-04-22 NOTE — PROGRESS NOTES
[facility] - Cardiology Stepdown  Nephrology  Progress Note    Patient Name: Radha Abbott  MRN: [id number]  Admission Date: 4/16/2024  Hospital Length of Stay: 6 days  Attending Provider: Brayan Ocampo MD   Primary Care Physician: Vasu Kong MD  Principal Problem:Pneumonia due to COVID-19 virus    Subjective:     HPI:  63 yo male with stage D HFrEF, ICMP with previous admission for ADHF/CS. S/p LVAD placement 12/2023 Lengthy post op course complicated by vasoplegia(requiring Giaprezza), debility, malnutrition/impaired swallowing, and renal failure requiring HD(since weaned off). presents to the ED for infectious workup due to complaints of fevers of 101.5F. Patient receiving abx, for presumed endocarditis/bactermia. Hospital course complicated by covid positive and oliguric IVÁN.     Concerning renal hx, patient following with [facility] nephrology Dr. Mendez in nephrology clinic. Baseline Scr ~4 with CKD IV/V, poor reserve.     Nephrology consulted for IVÁN    Interval History:   UOP 275ml, not received any diuretics, no need of urgent RRT, will closely monitor, CVP latest 8.  Seemed euvolumic, no SOB, lying comfortable in bed, chest clear, no visible JVD, can use diuretics if concerns for volume overload otherwise will monitor closely, ATN takes some time for recovery.  These recommendations are not final, staff attestation to follow.    Review of patient's allergies indicates:  No Known Allergies  Current Facility-Administered Medications   Medication Dose Route Frequency Provider Last Rate Last Admin    acetaminophen tablet 1,000 mg  1,000 mg Oral Q8H PRN Lucy Brand MD   1,000 mg at 04/21/24 1036    amiodarone tablet 400 mg  400 mg Oral Daily Lucy Brand MD   400 mg at 04/21/24 1012    ampicillin (OMNIPEN) 2 g in sodium chloride 0.9 % 100 mL IVPB (MB+)  2 g Intravenous Q12H Axel Caban MD   Stopped at 04/21/24 2155    cefTRIAXone (ROCEPHIN) 2 g in dextrose 5 % in water (D5W) 100 mL IVPB  (MB+)  2 g Intravenous Q12H Axel Caban MD   Stopped at 04/21/24 2015    dextrose 10% bolus 125 mL 125 mL  12.5 g Intravenous PRN Erasmo Parrish DNP, COREEN        dextrose 10% bolus 250 mL 250 mL  25 g Intravenous PRN Erasmo Parrish DNP, COREEN        famotidine tablet 20 mg  20 mg Oral Daily Lucy Brand MD   20 mg at 04/21/24 1012    gabapentin capsule 100 mg  100 mg Oral BID Javed Vivas MD   100 mg at 04/21/24 1633    glucagon (human recombinant) injection 1 mg  1 mg Intramuscular PRN Erasmo Parrish DNP, FNP        glucose chewable tablet 16 g  16 g Oral PRN Erasmo Parrish DNP, FNP        glucose chewable tablet 24 g  24 g Oral PRN Erasmo Parrish DNP, FNP        hydrALAZINE tablet 75 mg  75 mg Oral Q8H Lucy Brand MD   75 mg at 04/22/24 0539    insulin aspart U-100 pen 0-5 Units  0-5 Units Subcutaneous QID (AC + HS) PRN Erasmo Parrish DNP, COREEN        magnesium oxide tablet 400 mg  400 mg Oral Daily Lucy Brand MD   400 mg at 04/21/24 1012    mupirocin 2 % ointment   Nasal BID Brayan Ocampo MD   Given at 04/21/24 2049    ondansetron injection 4 mg  4 mg Intravenous Once Ric Lara MD        potassium chloride SA CR tablet 40 mEq  40 mEq Oral Once Angela Shen MD        senna-docusate 8.6-50 mg per tablet 2 tablet  2 tablet Oral Daily PRN Lucy Brand MD        sodium bicarbonate tablet 325 mg  325 mg Oral TID Lucy Brand MD   325 mg at 04/21/24 2048    trazodone split tablet 25 mg  25 mg Oral QHS Lucy Brand MD   25 mg at 04/21/24 2047    venlafaxine tablet 37.5 mg  37.5 mg Oral Daily Lucy Brand MD   37.5 mg at 04/21/24 1025       Objective:     Vital Signs (Most Recent):  Temp: 97.6 °F (36.4 °C) (04/22/24 0358)  Pulse: 76 (04/22/24 0637)  Resp: 18 (04/22/24 0358)  BP: (!) 88/0 (04/22/24 0358)  SpO2: 99 % (04/22/24 0530) Vital Signs (24h Range):  Temp:  [97.3 °F (36.3 °C)-98.4 °F (36.9 °C)] 97.6 °F  (36.4 °C)  Pulse:  [76-87] 76  Resp:  [16-18] 18  SpO2:  [99 %] 99 %  BP: ()/(0-82) 88/0     Weight: 77.5 kg (170 lb 13.7 oz) (04/22/24 0522)  Body mass index is 21.94 kg/m².  Body surface area is 2.01 meters squared.    I/O last 3 completed shifts:  In: 1340 [P.O.:1140; IV Piggyback:200]  Out: 345 [Urine:345]     Physical Exam  Constitutional:       Appearance: Normal appearance.   Pulmonary:      Effort: Pulmonary effort is normal. No respiratory distress.   Musculoskeletal:      Right lower leg: No edema.      Left lower leg: No edema.   Neurological:      General: No focal deficit present.      Mental Status: He is alert and oriented to person, place, and time.          Significant Labs:  BMP:   Recent Labs   Lab 04/22/24  0530      *   K 3.5   CL 91*   CO2 21*   *   CREATININE 8.6*   CALCIUM 8.5*   MG 2.4     CBC:   Recent Labs   Lab 04/22/24  0530   WBC 5.14   RBC 2.80*   HGB 8.1*   HCT 23.3*      MCV 83   MCH 28.9   MCHC 34.8        Significant Imaging:  Labs: Reviewed  X-Ray: Reviewed  Assessment/Plan:     Renal/  IVÁN (acute kidney injury)  Anuric IVÁN on baseline CKD IV  Baseline Scr ~4; Scr at time of consult ~8.3  UA Trace protein; bland otherwise.   UPCR 0.61g  Renal US: not avaialbe. In 2023 showed MRD.  Suspect ATN given rapid increase in Scr and anuria. Patient with prior CKD V poor reserve. Chart reviewed in depth, no inciting cause of IVÁN (normal vanc troughs, no obvious nephrotoxins, normal CPK). Suspect insult occurred in setting of COVID/bacteremia.   Plan;    - No urgent indications for RRT at this time, will closely monitor.  -Euvolemic, hold diuretics for now, if concerns for volume overload can use it.  - I/Os  - renally dose all medications  - avoid nephrotoxins as much as possible.     ID  Bacteremia  COVID positive  E. Fecalis bacteremia  Per primary team.         Thank you for your consult. I will follow-up with patient. Please contact us if you have any  additional questions.    Kamilla Angela MD  Nephrology  [facility] - Cardiology Stepdown

## 2024-04-22 NOTE — PLAN OF CARE
[facility]   Heart Transplant Clinic  [address] [place] [address]   [phone] after hours        HOME  HEALTH ORDERS      Admit to Home Health    Diagnosis:   Patient Active Problem List   Diagnosis    Ventricular tachycardia    CAD (coronary artery disease)    Chronic combined systolic and diastolic heart failure    Type 2 diabetes mellitus without complication, without long-term current use of insulin    PAF (paroxysmal atrial fibrillation)    Ventricular fibrillation    Acute on chronic combined systolic and diastolic CHF, NYHA class 4    Defibrillator discharge    Palliative care encounter    Advance care planning    LVAD (left ventricular assist device) present    Abnormal CXR    Acute encephalopathy    Congestive heart failure    Depressed mood    Moderate protein-calorie malnutrition    Dysphonia    Unilateral complete vocal fold paralysis    Oliguria    Shortness of breath    Adjustment disorder with depressed mood    Deep tissue injury    Blood blister    Lymphadenopathy    At high risk for skin breakdown    Parotid mass    Impaired mobility    Anemia due to chronic kidney disease, on chronic dialysis    At risk for amiodarone toxicity with long term use    Anemia in stage 4 chronic kidney disease    End stage heart failure    IVÁN (acute kidney injury)    Pneumonia due to COVID-19 virus    Headache    Hypertension    Weakness    Hyponatremia    Bacteremia    Pneumonia of right lung due to infectious organism       Patient is homebound due to:   Diet: Low Sodium  Acitivities: As Tolerated    Nursing:   SN to complete comprehensive assessment including routine vital signs. Instruct on disease process and s/s of complications to report to MD. Review/verify medication list sent home with the patient at time of discharge  and instruct patient/caregiver as needed. Frequency may be adjusted depending on start of care date.    Notify MD if SBP > 160 or <  90; DBP > 90 or < 50; HR > 120 or < 50; Temp > 101; Weight gain >3lbs in 1 day or 5lbs in 1 week.    LABS:  SN to perform labs: Order the following labs to be drawn on Mondays:   CBC  CMP     HOME INFUSION THERAPY:   SN to perform Infusion Therapy/Central Line Care.  Review Central Line Care & Central Line Flush with patient.    Administer (drug and dose):   Ceftriaxone 2 grams IV q 12 hours   Ampicillin 2 grams IV q 12 hours     For 6 wks (estimated end date 5/30/2024)    **For questions or concerns, please call [phone] and ask for Pre-Heart transplant clinic, M-F 8-5. After hours, weekends, call [phone] and ask for the Heart Transplant Cardiologist on call.**    Central line care:  Scrub the Hub: Prior to accessing the line, always perform a 30 second alcohol scrub  Each lumen of the central line is to be flushed at least daily with 10 mL Normal Saline and 3 mL Heparin flush (100 units/mL)  Skilled Nurse (SN) may draw blood from IV access  Blood Draw Procedure:   - Aspirate at least 5 mL of blood   - Discard   - Obtain specimen   - Change posiflow cap   - Flush with 20 mL Normal Saline followed by a                 3-5 mL Heparin flush (100 units/mL)  :   - Sterile dressing changes are done weekly and as needed.   - Use chlor-hexadine scrub to cleanse site, apply Biopatch to insertion site,       apply securement device dressing   - Posi-flow caps are changed weekly and after EVERY lab draw.   - If sterile gauze is under dressing to control oozing,                 dressing change must be performed every 24 hours until gauze is not needed.    CONSULTS:    Physical Therapy to evaluate and treat. Evaluate for home safety and equipment needs; Establish/upgrade home exercise program. Perform / instruct on therapeutic exercises, gait training, transfer training, and Range of Motion.    Send initial Home Health orders to [facility] attending physician on call.  Send follow up questions to  [phone] or fax:              Heart Failure:      [phone]

## 2024-04-22 NOTE — PROGRESS NOTES
Spoke with Dr. Mendez regarding pt HD TX UF, informed that pt may tolerate 1 Liter fluid removal instead of ordered 1.5 L, MD aware that pt has LVAD. Ok to remove 1 Liter UF per MD.

## 2024-04-22 NOTE — CARE UPDATE
Care Update:     No acute events overnight. Patient in room 311/311 A. Blood glucose stable on current inpatient regimen.No hypoglycemia noted over the past 24-hours. Endocrine will continue to follow and manage insulin orders inpatient.       Steroid use- None.   Renal function-   Lab Results   Component Value Date    CREATININE 8.6 (H) 04/22/2024        Diet Renal Coumadin Restriction; Fluid - 1500mL; Isolation Tray - Regular [place]     POCT Glucose   Date Value Ref Range Status   04/22/2024 118 (H) 70 - 110 mg/dL Final   04/21/2024 163 (H) 70 - 110 mg/dL Final   04/21/2024 119 (H) 70 - 110 mg/dL Final   04/20/2024 145 (H) 70 - 110 mg/dL Final   04/20/2024 138 (H) 70 - 110 mg/dL Final   04/20/2024 179 (H) 70 - 110 mg/dL Final   04/20/2024 112 (H) 70 - 110 mg/dL Final   04/19/2024 158 (H) 70 - 110 mg/dL Final   04/19/2024 163 (H) 70 - 110 mg/dL Final   04/19/2024 162 (H) 70 - 110 mg/dL Final     Lab Results   Component Value Date    HGBA1C 7.4 (H) 04/17/2024       Endocrine  Type 2 diabetes mellitus without complication, without long-term current use of insulin  Endocrinology consulted for BG management.   BG goal 140-180     - Novolog (Insulin Aspart) prn for BG excursions LDC SSI (150/50)  - BG checks AC/HS  - Hypoglycemia protocol in place     ** Please notify Endocrine for any change and/or advance in diet**  ** Please call Endocrine for any BG related issues **     Discharge Planning:   TBD. Please notify endocrinology prior to discharge.    Erasmo Parrish DNP, FNP-C  Department of Endocrinology  Inpatient Glycemic Management

## 2024-04-22 NOTE — SUBJECTIVE & OBJECTIVE
Interval History:   NAEON. No acute complaints. 300cc of UO in past 24hrs. CVP 8 this AM.     Continuous Infusions:  Current Facility-Administered Medications   Medication Dose Route Frequency Last Rate Last Admin     Scheduled Meds:  Current Facility-Administered Medications   Medication Dose Route Frequency    amiodarone  400 mg Oral Daily    ampicillin IV (PEDS and ADULTS)  2 g Intravenous Q12H    cefTRIAXone (Rocephin) IV (PEDS and ADULTS)  2 g Intravenous Q12H    famotidine  20 mg Oral Daily    gabapentin  100 mg Oral BID    hydrALAZINE  75 mg Oral Q8H    magnesium oxide  400 mg Oral Daily    mupirocin   Nasal BID    ondansetron  4 mg Intravenous Once    potassium chloride  40 mEq Oral Once    sodium bicarbonate  325 mg Oral TID    traZODone  25 mg Oral QHS    venlafaxine  37.5 mg Oral Daily     PRN Meds:  Current Facility-Administered Medications:     acetaminophen, 1,000 mg, Oral, Q8H PRN    dextrose 10%, 12.5 g, Intravenous, PRN    dextrose 10%, 25 g, Intravenous, PRN    glucagon (human recombinant), 1 mg, Intramuscular, PRN    glucose, 16 g, Oral, PRN    glucose, 24 g, Oral, PRN    insulin aspart U-100, 0-5 Units, Subcutaneous, QID (AC + HS) PRN    senna-docusate 8.6-50 mg, 2 tablet, Oral, Daily PRN    Review of patient's allergies indicates:  No Known Allergies  Objective:     Vital Signs (Most Recent):  Temp: 97.6 °F (36.4 °C) (04/22/24 0358)  Pulse: 76 (04/22/24 0637)  Resp: 18 (04/22/24 0358)  BP: (!) 80/0 (04/22/24 0800)  SpO2: 99 % (04/22/24 0530) Vital Signs (24h Range):  Temp:  [97.3 °F (36.3 °C)-98.4 °F (36.9 °C)] 97.6 °F (36.4 °C)  Pulse:  [76-85] 76  Resp:  [16-18] 18  SpO2:  [99 %] 99 %  BP: ()/(0-82) 80/0     Patient Vitals for the past 72 hrs (Last 3 readings):   Weight   04/22/24 0522 77.5 kg (170 lb 13.7 oz)   04/21/24 0504 76.1 kg (167 lb 12.3 oz)   04/19/24 1102 75.3 kg (166 lb 0.1 oz)     Body mass index is 21.94 kg/m².      Intake/Output Summary (Last 24 hours) at 4/22/2024  0919  Last data filed at 4/22/2024 0519  Gross per 24 hour   Intake 960 ml   Output 275 ml   Net 685 ml       Hemodynamic Parameters:  CVP:  [5 mmHg-10 mmHg] 8 mmHg    Telemetry: SR with PVC's       Physical Exam  Constitutional:       Appearance: He is ill-appearing.   HENT:      Head: Normocephalic and atraumatic.   Eyes:      Conjunctiva/sclera: Conjunctivae normal.      Pupils: Pupils are equal, round, and reactive to light.   Neck:      Comments: JVP 13cm  Cardiovascular:      Rate and Rhythm: Normal rate and regular rhythm.      Comments: Smooth VAD hum  Pulmonary:      Effort: Pulmonary effort is normal.      Comments: Breath sounds are decreased right base. Lungs essentially CTA bilaterally  Abdominal:      General: Bowel sounds are normal.      Palpations: Abdomen is soft.   Musculoskeletal:         General: No swelling. Normal range of motion.      Cervical back: Normal range of motion and neck supple.   Skin:     General: Skin is warm and dry.      Capillary Refill: Capillary refill takes 2 to 3 seconds.   Neurological:      General: No focal deficit present.      Mental Status: He is alert and oriented to person, place, and time.            Significant Labs:  CBC:  Recent Labs   Lab 04/20/24  0234 04/21/24  0640 04/21/24  1255 04/22/24  0530   WBC 8.54 5.29  --  5.14   RBC 3.18* 3.04*  --  2.80*   HGB 8.9* 8.6*  --  8.1*   HCT 27.3* 26.3* 27* 23.3*    314  --  310   MCV 86 87  --  83   MCH 28.0 28.3  --  28.9   MCHC 32.6 32.7  --  34.8     BNP:  Recent Labs   Lab 04/17/24  0409 04/19/24  0530 04/22/24  0530   * 409* 1,116*     CMP:  Recent Labs   Lab 04/17/24  0409 04/18/24  0534 04/19/24  0530 04/20/24  0234 04/21/24  0640 04/21/24  1752 04/22/24  0530   *   < > 104   < > 102 161* 105   CALCIUM 9.5   < > 9.2   < > 8.7 8.4* 8.5*   ALBUMIN 3.2*  --  3.0*  --   --   --  2.5*   PROT 8.2  --  8.2  --   --   --  6.9   *   < > 135*   < > 131* 131* 129*   K 3.9   < > 3.6   < > 3.4*  3.4* 3.5   CO2 25   < > 24   < > 21* 22* 21*   CL 94*   < > 92*   < > 91* 90* 91*   BUN 82*   < > 98*   < > 119* 113* 114*   CREATININE 4.3*   < > 6.0*   < > 8.3* 8.2* 8.6*   ALKPHOS 143*  --  139*  --   --   --  154*   *  --  516*  --   --   --  230*   AST 88*  --  644*  --   --   --  168*   BILITOT 0.5  --  0.8  --   --   --  0.2    < > = values in this interval not displayed.      Coagulation:   Recent Labs   Lab 04/20/24 0234 04/21/24  0640 04/22/24  0530   INR 2.6* 4.0* 6.1*   APTT 49.8* 56.8* 68.6*     LDH:  Recent Labs   Lab 04/20/24  0234 04/21/24  0640 04/22/24  0530   * 419* 409*     Microbiology:  Microbiology Results (last 7 days)       Procedure Component Value Units Date/Time    Blood culture [[id number]] Collected: 04/20/24 0234    Order Status: Completed Specimen: Blood Updated: 04/22/24 0613     Blood Culture, Routine No Growth to date      No Growth to date      No Growth to date    Blood culture [[id number]] Collected: 04/20/24 0240    Order Status: Completed Specimen: Blood Updated: 04/22/24 0613     Blood Culture, Routine No Growth to date      No Growth to date      No Growth to date    Blood culture [[id number]] Collected: 04/18/24 0534    Order Status: Completed Specimen: Blood Updated: 04/21/24 1012     Blood Culture, Routine No Growth to date      No Growth to date      No Growth to date      No Growth to date    Blood culture [[id number]] Collected: 04/18/24 0535    Order Status: Completed Specimen: Blood Updated: 04/21/24 1012     Blood Culture, Routine No Growth to date      No Growth to date      No Growth to date      No Growth to date    Blood culture x two cultures. Draw prior to antibiotics. [[id number]]  (Abnormal) Collected: 04/16/24 2322    Order Status: Completed Specimen: Blood from Peripheral, Hand, Right Updated: 04/19/24 1501     Blood Culture, Routine Gram stain aer bottle: Gram positive cocci      Gram stain nata bottle: Gram positive cocci      Positive  results previously called 04/17/2024 15:08      ENTEROCOCCUS FAECALIS  For susceptibility see order #[id number]      Narrative:      Aerobic and anaerobic    Blood culture x two cultures. Draw prior to antibiotics. [[id number]]  (Abnormal)  (Susceptibility) Collected: 04/16/24 2322    Order Status: Completed Specimen: Blood from Peripheral, Hand, Left Updated: 04/19/24 1500     Blood Culture, Routine Gram stain aer bottle: Gram positive cocci      Gram stain nata bottle: Gram positive cocci      Results called to and read back by: Rachael Horan RN 04/17/2024  15:08      ENTEROCOCCUS FAECALIS    Narrative:      Aerobic and anaerobic    Rapid Organism ID by PCR (from Blood culture) [[id number]]  (Abnormal) Collected: 04/16/24 2322    Order Status: Completed Updated: 04/17/24 1726     Enterococcus faecalis Detected     Enterococcus faecium Not Detected     Listeria monocytogenes Not Detected     Staphylococcus spp. Not Detected     Staphylococcus aureus Not Detected     Staphylococcus epidermidis Not Detected     Staphylococcus lugdunensis Not Detected     Streptococcus species Not Detected     Streptococcus agalactiae Not Detected     Streptococcus pneumoniae Not Detected     Streptococcus pyogenes Not Detected     Acinetobacter calcoaceticus/baumannii complex Not Detected     Bacteroides fragilis Not Detected     Enterobacterales Not Detected     Enterobacter cloacae complex Not Detected     Escherichia coli Not Detected     Klebsiella aerogenes Not Detected     Klebsiella oxytoca Not Detected     Klebsiella pneumoniae group Not Detected     Proteus Not Detected     Salmonella sp Not Detected     Serratia marcescens Not Detected     Haemophilus influenzae Not Detected     Neisseria meningtidis Not Detected     Pseudomonas aeruginosa Not Detected     Stenotrophomonas maltophilia Not Detected     Candida albicans Not Detected     Candida auris Not Detected     Candida glabrata Not Detected     Candida krusei Not Detected      Rakel parapsilosis Not Detected     Candida tropicalis Not Detected     Cryptococcus neoformans/gattii Not Detected     CTX-M (ESBL ) Test Not Applicable     IMP (Carbapenem resistant) Test Not Applicable     KPC resistance gene (Carbapenem resistant) Test Not Applicable     mcr-1  Test Not Applicable     mec A/C  Test Not Applicable     mec A/C and MREJ (MRSA) gene Test Not Applicable     NDM (Carbapenem resistant) Test Not Applicable     OXA-48-like (Carbapenem resistant) Test Not Applicable     van A/B (VRE gene) Not Detected     VIM (Carbapenem resistant) Test Not Applicable    Narrative:      Aerobic and anaerobic    Influenza A & B by Molecular [[id number]] Collected: 04/16/24 2347    Order Status: Completed Specimen: Nasopharyngeal Swab Updated: 04/17/24 0059     Influenza A, Molecular Negative     Influenza B, Molecular Negative     Flu A & B Source Nasal swab            I have reviewed all pertinent labs within the past 24 hours.    Estimated Creatinine Clearance: 9.8 mL/min (A) (based on SCr of 8.6 mg/dL (H)).    Diagnostic Results:  I have reviewed and interpreted all pertinent imaging results/findings within the past 24 hours.

## 2024-04-22 NOTE — PROGRESS NOTES
[facility] - Cardiology Stepdown  Heart Transplant  Progress Note    Patient Name: Radha Abbott  MRN: [id number]  Admission Date: 4/16/2024  Hospital Length of Stay: 6 days  Attending Physician: Brayan Ocampo MD  Primary Care Provider: Vasu Kong MD  Principal Problem:Pneumonia due to COVID-19 virus    Subjective:   Interval History:   NAEON. No acute complaints. 300cc of UO in past 24hrs. CVP 8 this AM.     Continuous Infusions:  Current Facility-Administered Medications   Medication Dose Route Frequency Last Rate Last Admin     Scheduled Meds:  Current Facility-Administered Medications   Medication Dose Route Frequency    amiodarone  400 mg Oral Daily    ampicillin IV (PEDS and ADULTS)  2 g Intravenous Q12H    cefTRIAXone (Rocephin) IV (PEDS and ADULTS)  2 g Intravenous Q12H    famotidine  20 mg Oral Daily    gabapentin  100 mg Oral BID    hydrALAZINE  75 mg Oral Q8H    magnesium oxide  400 mg Oral Daily    mupirocin   Nasal BID    ondansetron  4 mg Intravenous Once    potassium chloride  40 mEq Oral Once    sodium bicarbonate  325 mg Oral TID    traZODone  25 mg Oral QHS    venlafaxine  37.5 mg Oral Daily     PRN Meds:  Current Facility-Administered Medications:     acetaminophen, 1,000 mg, Oral, Q8H PRN    dextrose 10%, 12.5 g, Intravenous, PRN    dextrose 10%, 25 g, Intravenous, PRN    glucagon (human recombinant), 1 mg, Intramuscular, PRN    glucose, 16 g, Oral, PRN    glucose, 24 g, Oral, PRN    insulin aspart U-100, 0-5 Units, Subcutaneous, QID (AC + HS) PRN    senna-docusate 8.6-50 mg, 2 tablet, Oral, Daily PRN    Review of patient's allergies indicates:  No Known Allergies  Objective:     Vital Signs (Most Recent):  Temp: 97.6 °F (36.4 °C) (04/22/24 0358)  Pulse: 76 (04/22/24 0637)  Resp: 18 (04/22/24 0358)  BP: (!) 80/0 (04/22/24 0800)  SpO2: 99 % (04/22/24 0530) Vital Signs (24h Range):  Temp:  [97.3 °F (36.3 °C)-98.4 °F (36.9 °C)] 97.6 °F (36.4 °C)  Pulse:  [76-85] 76  Resp:  [16-18] 18  SpO2:   [99 %] 99 %  BP: ()/(0-82) 80/0     Patient Vitals for the past 72 hrs (Last 3 readings):   Weight   04/22/24 0522 77.5 kg (170 lb 13.7 oz)   04/21/24 0504 76.1 kg (167 lb 12.3 oz)   04/19/24 1102 75.3 kg (166 lb 0.1 oz)     Body mass index is 21.94 kg/m².      Intake/Output Summary (Last 24 hours) at 4/22/2024 0919  Last data filed at 4/22/2024 0519  Gross per 24 hour   Intake 960 ml   Output 275 ml   Net 685 ml       Hemodynamic Parameters:  CVP:  [5 mmHg-10 mmHg] 8 mmHg    Telemetry: SR with PVC's       Physical Exam  Constitutional:       Appearance: He is ill-appearing.   HENT:      Head: Normocephalic and atraumatic.   Eyes:      Conjunctiva/sclera: Conjunctivae normal.      Pupils: Pupils are equal, round, and reactive to light.   Neck:      Comments: JVP 13cm  Cardiovascular:      Rate and Rhythm: Normal rate and regular rhythm.      Comments: Smooth VAD hum  Pulmonary:      Effort: Pulmonary effort is normal.      Comments: Breath sounds are decreased right base. Lungs essentially CTA bilaterally  Abdominal:      General: Bowel sounds are normal.      Palpations: Abdomen is soft.   Musculoskeletal:         General: No swelling. Normal range of motion.      Cervical back: Normal range of motion and neck supple.   Skin:     General: Skin is warm and dry.      Capillary Refill: Capillary refill takes 2 to 3 seconds.   Neurological:      General: No focal deficit present.      Mental Status: He is alert and oriented to person, place, and time.            Significant Labs:  CBC:  Recent Labs   Lab 04/20/24  0234 04/21/24  0640 04/21/24  1255 04/22/24  0530   WBC 8.54 5.29  --  5.14   RBC 3.18* 3.04*  --  2.80*   HGB 8.9* 8.6*  --  8.1*   HCT 27.3* 26.3* 27* 23.3*    314  --  310   MCV 86 87  --  83   MCH 28.0 28.3  --  28.9   MCHC 32.6 32.7  --  34.8     BNP:  Recent Labs   Lab 04/17/24  0409 04/19/24  0530 04/22/24  0530   * 409* 1,116*     CMP:  Recent Labs   Lab 04/17/24  0409  04/18/24  0534 04/19/24  0530 04/20/24  0234 04/21/24  0640 04/21/24  1752 04/22/24  0530   *   < > 104   < > 102 161* 105   CALCIUM 9.5   < > 9.2   < > 8.7 8.4* 8.5*   ALBUMIN 3.2*  --  3.0*  --   --   --  2.5*   PROT 8.2  --  8.2  --   --   --  6.9   *   < > 135*   < > 131* 131* 129*   K 3.9   < > 3.6   < > 3.4* 3.4* 3.5   CO2 25   < > 24   < > 21* 22* 21*   CL 94*   < > 92*   < > 91* 90* 91*   BUN 82*   < > 98*   < > 119* 113* 114*   CREATININE 4.3*   < > 6.0*   < > 8.3* 8.2* 8.6*   ALKPHOS 143*  --  139*  --   --   --  154*   *  --  516*  --   --   --  230*   AST 88*  --  644*  --   --   --  168*   BILITOT 0.5  --  0.8  --   --   --  0.2    < > = values in this interval not displayed.      Coagulation:   Recent Labs   Lab 04/20/24 0234 04/21/24 0640 04/22/24  0530   INR 2.6* 4.0* 6.1*   APTT 49.8* 56.8* 68.6*     LDH:  Recent Labs   Lab 04/20/24 0234 04/21/24 0640 04/22/24  0530   * 419* 409*     Microbiology:  Microbiology Results (last 7 days)       Procedure Component Value Units Date/Time    Blood culture [[id number]] Collected: 04/20/24 0234    Order Status: Completed Specimen: Blood Updated: 04/22/24 0613     Blood Culture, Routine No Growth to date      No Growth to date      No Growth to date    Blood culture [[id number]] Collected: 04/20/24 0240    Order Status: Completed Specimen: Blood Updated: 04/22/24 0613     Blood Culture, Routine No Growth to date      No Growth to date      No Growth to date    Blood culture [[id number]] Collected: 04/18/24 0534    Order Status: Completed Specimen: Blood Updated: 04/21/24 1012     Blood Culture, Routine No Growth to date      No Growth to date      No Growth to date      No Growth to date    Blood culture [[id number]] Collected: 04/18/24 0535    Order Status: Completed Specimen: Blood Updated: 04/21/24 1012     Blood Culture, Routine No Growth to date      No Growth to date      No Growth to date      No Growth to date    Blood  culture x two cultures. Draw prior to antibiotics. [[id number]]  (Abnormal) Collected: 04/16/24 2322    Order Status: Completed Specimen: Blood from Peripheral, Hand, Right Updated: 04/19/24 1501     Blood Culture, Routine Gram stain aer bottle: Gram positive cocci      Gram stain nata bottle: Gram positive cocci      Positive results previously called 04/17/2024 15:08      ENTEROCOCCUS FAECALIS  For susceptibility see order #[id number]      Narrative:      Aerobic and anaerobic    Blood culture x two cultures. Draw prior to antibiotics. [[id number]]  (Abnormal)  (Susceptibility) Collected: 04/16/24 2322    Order Status: Completed Specimen: Blood from Peripheral, Hand, Left Updated: 04/19/24 1500     Blood Culture, Routine Gram stain aer bottle: Gram positive cocci      Gram stain nata bottle: Gram positive cocci      Results called to and read back by: Rachael Horan RN 04/17/2024  15:08      ENTEROCOCCUS FAECALIS    Narrative:      Aerobic and anaerobic    Rapid Organism ID by PCR (from Blood culture) [[id number]]  (Abnormal) Collected: 04/16/24 2322    Order Status: Completed Updated: 04/17/24 1726     Enterococcus faecalis Detected     Enterococcus faecium Not Detected     Listeria monocytogenes Not Detected     Staphylococcus spp. Not Detected     Staphylococcus aureus Not Detected     Staphylococcus epidermidis Not Detected     Staphylococcus lugdunensis Not Detected     Streptococcus species Not Detected     Streptococcus agalactiae Not Detected     Streptococcus pneumoniae Not Detected     Streptococcus pyogenes Not Detected     Acinetobacter calcoaceticus/baumannii complex Not Detected     Bacteroides fragilis Not Detected     Enterobacterales Not Detected     Enterobacter cloacae complex Not Detected     Escherichia coli Not Detected     Klebsiella aerogenes Not Detected     Klebsiella oxytoca Not Detected     Klebsiella pneumoniae group Not Detected     Proteus Not Detected     Salmonella sp Not Detected      Serratia marcescens Not Detected     Haemophilus influenzae Not Detected     Neisseria meningtidis Not Detected     Pseudomonas aeruginosa Not Detected     Stenotrophomonas maltophilia Not Detected     Candida albicans Not Detected     Candida auris Not Detected     Candida glabrata Not Detected     Candida krusei Not Detected     Candida parapsilosis Not Detected     Candida tropicalis Not Detected     Cryptococcus neoformans/gattii Not Detected     CTX-M (ESBL ) Test Not Applicable     IMP (Carbapenem resistant) Test Not Applicable     KPC resistance gene (Carbapenem resistant) Test Not Applicable     mcr-1  Test Not Applicable     mec A/C  Test Not Applicable     mec A/C and MREJ (MRSA) gene Test Not Applicable     NDM (Carbapenem resistant) Test Not Applicable     OXA-48-like (Carbapenem resistant) Test Not Applicable     van A/B (VRE gene) Not Detected     VIM (Carbapenem resistant) Test Not Applicable    Narrative:      Aerobic and anaerobic    Influenza A & B by Molecular [[id number]] Collected: 04/16/24 2347    Order Status: Completed Specimen: Nasopharyngeal Swab Updated: 04/17/24 0059     Influenza A, Molecular Negative     Influenza B, Molecular Negative     Flu A & B Source Nasal swab            I have reviewed all pertinent labs within the past 24 hours.    Estimated Creatinine Clearance: 9.8 mL/min (A) (based on SCr of 8.6 mg/dL (H)).    Diagnostic Results:  I have reviewed and interpreted all pertinent imaging results/findings within the past 24 hours.  Assessment and Plan:     Mr. Radha Abbott is a very pleasant 65 yo male with stage D HFrEF, ICMP with previous admission for ADHF/CS. He did undergo HM3 placement placed by Dr Washington on 12/1/23. Lengthy post op course complicated by vasoplegia(requiring Giaprezza), debility, malnutrition/impaired swallowing, and renal failure requiring HD(since weaned off). Once medically stable, he was transferred to Rehab for further PT/OT/ST. Of note, pt left  with permacath still in place. He has done really well in rehab and now has continued to progress well at home. He is on a very high dose of Bumex 4 mg twice daily. VAD speed is at 5000 rpm. No VAD alarms noted on interrogation occasional PI events . Today he presents to the ED for infectious workup due to complaints of fevers of 101.5F associated with fatigue, generalized weakness, lower back pain and HA. He also reports a decline in appetite. Wife changes his driveline dressing every Saturday and has not noticed any purulance with dressing changes. Body aches/weakness started around 2am last night followed by fevers around 5pm. He has been taking tylenol which has helped brng down fevers but has not helped with HA. HA is 5/10 in intensity. He reports slight increase in body weight/ fluid retention yesterday for which he took a dose of Metolazone with a drop in weight by ~5lbs. Denies any SOB, LE swelling at this time.      2D Echo with CFD done on 3/26/2024  LVAD: There is a Heartmate III LVAD running at 5000 RPM. The aortic valve does not open. The ventricular septum is at midline. Inflow cannula well seated at the apex. Outflow graft not visualized.    Left Ventricle: The left ventricle is moderately dilated. Normal wall thickness. Global hypokinesis present. Septal motion is abnormal. There is severely reduced systolic function with a visually estimated ejection fraction of 5 - 10%. There is diastolic dysfunction but grade cannot be determined.    Right Ventricle: Mild right ventricular enlargement. Wall thickness is normal. Right ventricle wall motion  is normal. Systolic function is normal. Pacemaker lead present in the ventricle.    Left Atrium: Left atrium is severely dilated.    Right Atrium: Right atrium is moderately dilated.    Mitral Valve: The mitral valve is repaired with an Noble stitch. There is mild regurgitation.    IVC/SVC: Normal venous pressure at 3 mmHg.    * Pneumonia due to COVID-19  virus  -4/16/24 he presented to the ED for infectious workup due to complaints of fevers of 101.5F associated with fatigue, generalized weakness, lower back pain and HA that started this morning. No evidence of drive line infection. No history of line infection.   - COVID +ve  - CT chest/ abdomen / pelvis 4/16 showed new RML opacity  - S/p remdesavir.   - continue tylenol for fevers    IVÁN (acute kidney injury)  Most likely 2/2 to ATN on CKD IV. Worsening. Expect creatinine to peak and come down.     - Continue to hold diuretic regimen.   - Will continue to monitor.  - Nephrology consulted. Appreciate recs.         Bacteremia  -blood cxs from 4/16 +ve for E faecalis   -ID following  -E faecalis is a known cause of endocarditis and AICD lead infections.   -TTE was negative for vegetation.  However TTE does not rule out valvular or lead vegetation. Will treat this as endocarditis with 6 weeks of IV antibiotics followed by suppressive therapy   -Currently on Ampicillin and Ceftriaxone for 6wk course as per ID.   -Repeat cultures from 4/20 are negative thus far    Hyponatremia  -POA  -Daily labs       Hypertension  Continue hydralazine    Headache  C/o of HA on admit with no improvement despite tylenol  - CTH done 4/16 with no acute processes    LVAD (left ventricular assist device) present  -HeartMate 3 Implanted  12/1/2023  as DT  -Cont Coumadin, dosing by PharmD.  Goal INR 2.0-3.0 . Supratherapeutic today.  Will hold coumadin   -Antiplatelets Not on ASA due to low H/H on implant   -LDH is stable overall today. Will continue to monitor daily.  -Speed set at  5000     -Interrogation notable for no events  -ECHO 4/18/24 with speed at 5000 EF: 10-15%, LVEDD: 6.1cm, RV size normal function mod reduced   -RHC 1/22/24 with RA 13, PCWP 22, PA 50/25, CO 5.8, CI 3.0   -Not listed for OHTx, declined for OHTX due to comorbidities     Procedure: Device Interrogation Including analysis of device parameters  Current Settings:  Ventricular Assist Device  Review of device function is stable/unstable stable        4/22/2024     8:00 AM 4/22/2024     3:56 AM 4/21/2024    11:44 PM 4/21/2024     7:51 PM 4/21/2024     4:06 PM 4/21/2024    12:01 PM 4/21/2024     7:55 AM   TXP LVAD INTERROGATIONS   Type HeartMate3 HeartMate3 HeartMate3 HeartMate3 HeartMate3 HeartMate3 HeartMate3   Flow 3.7 3.6 3.7 3.4 3.9 3.9 3.7   Speed 5000 5000 5000 5050 5050 5000 5050   PI 5.9 5.9 6.2 8.2 5.4 4.6 6.5   Power ([place]) 3.5 3.4 3,4 3.5 3.4 3.4 3.5   LSL 4600 4600 4600 4600      Pulsatility Intermittent pulse Intermittent pulse Intermittent pulse Intermittent pulse Intermittent pulse Intermittent pulse Intermittent pulse         Type 2 diabetes mellitus without complication, without long-term current use of insulin  -SSI    Chronic combined systolic and diastolic heart failure  -ICM  -Last 2D Echo  4/18/24 : LVEF 10-15%, LVEDD  6.1 cm  -Euvolemic on examination today  -Was on Bumex 4mg BID at home.  Holding due to IVÁN   -GDMT with Hydralazine  -2g Na dietary restriction, 1500 mL fluid restriction, strict I/Os      Ventricular tachycardia  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        Angela Shen MD  Heart Transplant  [facility] - Cardiology Stepdown

## 2024-04-22 NOTE — PLAN OF CARE
Problem: Renal Function Impairment (Acute Kidney Injury/Impairment)  Goal: Effective Renal Function  Outcome: Ongoing, Progressing  Intervention: Monitor and Support Renal Function  Flowsheets (Taken 4/22/2024 0053)  Stabilization Measures:   verbal stimulation provided   fluid resuscitation initiated  Medication Review/Management: medications reviewed

## 2024-04-22 NOTE — PT/OT/SLP PROGRESS
"Occupational Therapy   Treatment    Name: Radha Abbott  MRN: [id number]  Admitting Diagnosis:  Pneumonia due to COVID-19 virus       Recommendations:     Discharge Recommendations: Low Intensity Therapy  Discharge Equipment Recommendations:  walker, rolling  Barriers to discharge:  None    Assessment:     Radha Abbott is a 64 y.o. male with a medical diagnosis of Pneumonia due to COVID-19 virus.  He presents with the following performance deficits affecting function: weakness, impaired endurance, impaired self care skills, impaired functional mobility, gait instability, decreased lower extremity function, impaired balance, impaired cardiopulmonary response to activity. Pt willing to participate, tolerated session well overall. Pt demonstrated good walker management and safety awareness during t/f's, good functional endurance to bathroom and back.     Rehab Prognosis:  Good; patient would benefit from acute skilled OT services to address these deficits and reach maximum level of function.       Plan:     Patient to be seen 4 x/week to address the above listed problems via self-care/home management, therapeutic activities, therapeutic exercises, neuromuscular re-education  Plan of Care Expires: 05/17/24  Plan of Care Reviewed with: patient, spouse    Subjective     Chief Complaint: none stated  Patient/Family Comments/goals: "I need to use the bathroom."  Pain/Comfort:  Pain Rating 1: 0/10  Pain Rating Post-Intervention 1: 0/10    Objective:     Communicated with: RN prior to session.  Patient found up in chair with LVAD, telemetry upon OT entry to room.    General Precautions: Standard, fall, LVAD, airborne, contact, droplet    Orthopedic Precautions:N/A  Braces: N/A  Respiratory Status: Room air     Occupational Performance:     Bed Mobility:    Pt started and finished session in chair    Functional Mobility/Transfers:  Patient completed Sit <> Stand Transfer with contact guard assistance  with  rolling walker   Toilet " t/f: CGA c/ RW  Functional Mobility: from chair to bathroom back to chair; SBA c/ RW; no LOB, no reported LE weakness    Activities of Daily Living:  Grooming: stand by assistance pt washed hands standing at sink  Toileting: supervision pt managed clothes, performed perineal care s/ assistance; SPV for safety      [facility] 6 Click ADL: 21    Treatment & Education:  Pt edu on role of OT, POC, safety when performing self care tasks , benefit of performing OOB activity, and safety when performing functional transfers and mobility.  - White board updated  - Self care tasks completed-- as noted above      Patient left up in chair with all lines intact, call button in reach, RN notified, and wife present    GOALS:   Multidisciplinary Problems       Occupational Therapy Goals          Problem: Occupational Therapy    Goal Priority Disciplines Outcome Interventions   Occupational Therapy Goal     OT, PT/OT Ongoing, Progressing    Description: Goals to be met by: 5/17/24     Patient will increase functional independence with ADLs by performing:    UE Dressing with Modified [place].  LE Dressing with Modified [place].  Grooming while standing at sink with Modified [place].  Toileting from toilet/bedside commode with Modified [place] for hygiene and clothing management.   Toilet transfer to toilet/bedside commode with Modified [place].                         Time Tracking:     OT Date of Treatment: 04/22/24  OT Start Time: 1330  OT Stop Time: 1350  OT Total Time (min): 20 min    Billable Minutes:Self Care/Home Management 16    OT/PRIETO: OT          4/22/2024

## 2024-04-22 NOTE — PT/OT/SLP PROGRESS
Physical Therapy Treatment    Patient Name:  Radha Abbott   MRN:  [id number]  Admitting Diagnosis:  Pneumonia due to COVID-19 virus   Recent Surgery: * No surgery found *    Admit Date: 4/16/2024  Length of Stay: 6 days    Recommendations:     Discharge Recommendations:  Low Intensity Therapy     Discharge Equipment Recommendations: walker, rolling   Barriers to discharge: None    Appropriate transfer level with nursing staff: Safe to transfer to bedside chair/bedside commode: stand pivot with 1 person with RW.    Plan:     During this hospitalization, patient to be seen 3 x/week to address the identified rehab impairments via gait training, therapeutic activities, therapeutic exercises, neuromuscular re-education and progress towards the established goals.  Plan of Care Expires:  05/17/24  Plan of Care Reviewed with: patient, spouse    Assessment:     Radha Abbott is a 64 y.o. male admitted with a medical diagnosis of Pneumonia due to COVID-19 virus. Pt found upright in chair agreeable to therapy session. Pt demo'd improvements as he increase distance gait trained with less assistance required. Further mobility limited 2/2 isolation precautions. Pt completing therex this session. Patient would benefit from skilled therapy services to maximize safety and independence, increase activity tolerance, decrease fall risk, decrease caregiver burden, improve QOL, improve patient's functional mobility, and decrease risk of contractures and pressure sores.  Patient continues to demonstrate the need for low intensity therapy on a scheduled basis exhibited by decreased independence with functional mobility     Problem List: weakness, impaired endurance, impaired functional mobility, gait instability, impaired balance, decreased lower extremity function, decreased safety awareness, impaired cardiopulmonary response to activity.  Rehab Prognosis: Good; patient would benefit from acute skilled PT services to address these deficits and  "reach maximum level of function.      Goals:   Multidisciplinary Problems       Physical Therapy Goals          Problem: Physical Therapy    Goal Priority Disciplines Outcome Goal Variances Interventions   Physical Therapy Goal     PT, PT/OT Ongoing, Progressing     Description: Goals to be met by: 24     Patient will increase functional independence with mobility by performin. Supine to sit with Modified [place]  2. Sit to stand transfer with Modified [place]  3. Bed to chair transfer with Modified [place] using LRAD  4. Gait  x 150 feet with Supervision using LRAD.   5. Lower extremity exercise program x15 reps per handout, with independence                         Subjective     RN notified prior to session. Wife present upon PT entrance into room. Patient agreeable to PT treatment session.    Chief Complaint: "Well how did I do?"- after walking  Patient/Family Comments/goals: go home  Pain/Comfort:  Pain Rating 1: 0/10  Pain Rating Post-Intervention 1: 0/10      Objective:     Patient found up in chair with: telemetry, LVAD   Cognition:   Alert and Cooperative  Patient is oriented to Person, Place, Time, Situation  General Precautions: Standard, Cardiac fall, droplet, airborne, contact, LVAD   Orthopedic Precautions:N/A   Braces: N/A   Body mass index is 21.94 kg/m².  Oxygen Device: Room Air  Vitals: BP (!) 88/0 (BP Location: Left arm, Patient Position: Sitting)   Pulse 84   Temp 97.6 °F (36.4 °C)   Resp 18   Ht 6' 2" (1.88 m)   Wt 77.5 kg (170 lb 13.7 oz)   SpO2 (!) 94%   BMI 21.94 kg/m²     Outcome Measures:  AM-PAC 6 CLICK MOBILITY  Turning over in bed (including adjusting bedclothes, sheets and blankets)?: 3  Sitting down on and standing up from a chair with arms (e.g., wheelchair, bedside commode, etc.): 3  Moving from lying on back to sitting on the side of the bed?: 3  Moving to and from a bed to a chair (including a wheelchair)?: 3  Need to walk in hospital room?: " 3  Climbing 3-5 steps with a railing?: 3  Basic Mobility Total Score: 18     Functional Mobility:    Bed Mobility:   Pt found/returned to bedside chair    Transfers:   Sit <> Stand Transfer: contact guard assistance with rolling walker   Pt with slight posterior LOB upon standing requiring CGA for balance assist    Balance:  Standing:  Static: stand by assistance  Dynamic: stand by assistance      Gait:  Patient ambulated: 34'   Patient required: standy by assistance  Patient used:  rolling walker   Gait Deviation(s): steady gait, decreased step length, narrow base of support, flexed posture, and decreased gina  all lines remained intact throughout ambulation trial  Comments: Patient steady with no LOB during trial    Therapeutic Exercises:   Patient declined standing therex and reported he will do seated therex on his own.     Education:  Time provided for education, counseling and discussion of health disposition in regards to patient's current status  All questions answered within PT scope of practice and to patient's satisfaction  PT role in POC to address current functional deficits  Call nursing/pct to transfer to chair/use bathroom. Pt stated understanding.    Patient left up in chair with all lines intact, call button in reach, and RN present.    Time Tracking:     PT Received On: 04/22/24  PT Start Time: 1357     PT Stop Time: 1407  PT Total Time (min): 10 min     Billable Minutes:   Gait Training 10 minutes    Treatment Type: Treatment  PT/PTA: PT       4/22/2024

## 2024-04-22 NOTE — PROGRESS NOTES
Follow up note:     [facility] Transplant  spoke to the pt's spouse by phone  who reports it's been a long hospital stay for the pt, but he is coping well and moving about his room.     Pt/spouse indicated [facility] and [facility] for IV medication can be contacted for discharge services. Pt's spouse reports home physical therapy would be beneficial to the pt's recovery.    When discharged the pt will return to his own home receiving support/transportation from spouse.   Pt's spouse reports no additional needs/concerns at this time.   [facility] Social Workers will continue to follow as needed.

## 2024-04-23 LAB
ALLENS TEST: ABNORMAL
ANION GAP SERPL CALC-SCNC: 11 MMOL/L (ref 8–16)
APTT PPP: 65.1 SEC (ref 21–32)
BACTERIA BLD CULT: NORMAL
BACTERIA BLD CULT: NORMAL
BASOPHILS # BLD AUTO: 0.01 K/UL (ref 0–0.2)
BASOPHILS NFR BLD: 0.2 % (ref 0–1.9)
BUN SERPL-MCNC: 58 MG/DL (ref 8–23)
CALCIUM SERPL-MCNC: 8.7 MG/DL (ref 8.7–10.5)
CHLORIDE SERPL-SCNC: 102 MMOL/L (ref 95–110)
CO2 SERPL-SCNC: 23 MMOL/L (ref 23–29)
CREAT SERPL-MCNC: 5.4 MG/DL (ref 0.5–1.4)
DELSYS: ABNORMAL
DIFFERENTIAL METHOD BLD: ABNORMAL
EOSINOPHIL # BLD AUTO: 0.1 K/UL (ref 0–0.5)
EOSINOPHIL NFR BLD: 1.7 % (ref 0–8)
ERYTHROCYTE [DISTWIDTH] IN BLOOD BY AUTOMATED COUNT: 17.2 % (ref 11.5–14.5)
EST. GFR  (NO RACE VARIABLE): 11.2 ML/MIN/1.73 M^2
GLUCOSE SERPL-MCNC: 133 MG/DL (ref 70–110)
HCT VFR BLD AUTO: 23.3 % (ref 40–54)
HGB BLD-MCNC: 8 G/DL (ref 14–18)
IMM GRANULOCYTES # BLD AUTO: 0.03 K/UL (ref 0–0.04)
IMM GRANULOCYTES NFR BLD AUTO: 0.7 % (ref 0–0.5)
INR PPP: 6.9 (ref 0.8–1.2)
LDH SERPL L TO P-CCNC: 404 U/L (ref 110–260)
LYMPHOCYTES # BLD AUTO: 0.5 K/UL (ref 1–4.8)
LYMPHOCYTES NFR BLD: 11.6 % (ref 18–48)
MAGNESIUM SERPL-MCNC: 2.1 MG/DL (ref 1.6–2.6)
MAGNESIUM SERPL-MCNC: 2.2 MG/DL (ref 1.6–2.6)
MCH RBC QN AUTO: 28.8 PG (ref 27–31)
MCHC RBC AUTO-ENTMCNC: 34.3 G/DL (ref 32–36)
MCV RBC AUTO: 84 FL (ref 82–98)
MODE: ABNORMAL
MONOCYTES # BLD AUTO: 0.4 K/UL (ref 0.3–1)
MONOCYTES NFR BLD: 8.3 % (ref 4–15)
NEUTROPHILS # BLD AUTO: 3.3 K/UL (ref 1.8–7.7)
NEUTROPHILS NFR BLD: 77.5 % (ref 38–73)
NRBC BLD-RTO: 0 /100 WBC
PHOSPHATE SERPL-MCNC: 3.3 MG/DL (ref 2.7–4.5)
PLATELET # BLD AUTO: 292 K/UL (ref 150–450)
PMV BLD AUTO: 9.8 FL (ref 9.2–12.9)
PO2 BLDA: 35 MMHG (ref 40–60)
POC SATURATED O2: 64 % (ref 95–100)
POCT GLUCOSE: 107 MG/DL (ref 70–110)
POCT GLUCOSE: 133 MG/DL (ref 70–110)
POCT GLUCOSE: 167 MG/DL (ref 70–110)
POCT GLUCOSE: 207 MG/DL (ref 70–110)
POTASSIUM SERPL-SCNC: 3.9 MMOL/L (ref 3.5–5.1)
POTASSIUM SERPL-SCNC: 4 MMOL/L (ref 3.5–5.1)
PROTHROMBIN TIME: 67.1 SEC (ref 9–12.5)
RBC # BLD AUTO: 2.78 M/UL (ref 4.6–6.2)
SAMPLE: ABNORMAL
SITE: ABNORMAL
SODIUM SERPL-SCNC: 136 MMOL/L (ref 136–145)
WBC # BLD AUTO: 4.24 K/UL (ref 3.9–12.7)

## 2024-04-23 PROCEDURE — 27000248 HC VAD-ADDITIONAL DAY

## 2024-04-23 PROCEDURE — 63600175 PHARM REV CODE 636 W HCPCS: Performed by: INTERNAL MEDICINE

## 2024-04-23 PROCEDURE — 85610 PROTHROMBIN TIME: CPT | Performed by: STUDENT IN AN ORGANIZED HEALTH CARE EDUCATION/TRAINING PROGRAM

## 2024-04-23 PROCEDURE — 25000003 PHARM REV CODE 250: Performed by: STUDENT IN AN ORGANIZED HEALTH CARE EDUCATION/TRAINING PROGRAM

## 2024-04-23 PROCEDURE — 99900035 HC TECH TIME PER 15 MIN (STAT)

## 2024-04-23 PROCEDURE — 99233 SBSQ HOSP IP/OBS HIGH 50: CPT | Mod: ,,, | Performed by: INTERNAL MEDICINE

## 2024-04-23 PROCEDURE — 84132 ASSAY OF SERUM POTASSIUM: CPT | Performed by: STUDENT IN AN ORGANIZED HEALTH CARE EDUCATION/TRAINING PROGRAM

## 2024-04-23 PROCEDURE — 93750 INTERROGATION VAD IN PERSON: CPT | Mod: ,,, | Performed by: INTERNAL MEDICINE

## 2024-04-23 PROCEDURE — 84100 ASSAY OF PHOSPHORUS: CPT | Performed by: STUDENT IN AN ORGANIZED HEALTH CARE EDUCATION/TRAINING PROGRAM

## 2024-04-23 PROCEDURE — 82803 BLOOD GASES ANY COMBINATION: CPT

## 2024-04-23 PROCEDURE — 85025 COMPLETE CBC W/AUTO DIFF WBC: CPT | Performed by: STUDENT IN AN ORGANIZED HEALTH CARE EDUCATION/TRAINING PROGRAM

## 2024-04-23 PROCEDURE — 27000207 HC ISOLATION

## 2024-04-23 PROCEDURE — 83615 LACTATE (LD) (LDH) ENZYME: CPT | Performed by: STUDENT IN AN ORGANIZED HEALTH CARE EDUCATION/TRAINING PROGRAM

## 2024-04-23 PROCEDURE — 85730 THROMBOPLASTIN TIME PARTIAL: CPT | Performed by: STUDENT IN AN ORGANIZED HEALTH CARE EDUCATION/TRAINING PROGRAM

## 2024-04-23 PROCEDURE — 51798 US URINE CAPACITY MEASURE: CPT

## 2024-04-23 PROCEDURE — 80048 BASIC METABOLIC PNL TOTAL CA: CPT | Performed by: STUDENT IN AN ORGANIZED HEALTH CARE EDUCATION/TRAINING PROGRAM

## 2024-04-23 PROCEDURE — 25000003 PHARM REV CODE 250: Performed by: INTERNAL MEDICINE

## 2024-04-23 PROCEDURE — 83735 ASSAY OF MAGNESIUM: CPT | Mod: 91 | Performed by: STUDENT IN AN ORGANIZED HEALTH CARE EDUCATION/TRAINING PROGRAM

## 2024-04-23 PROCEDURE — 83735 ASSAY OF MAGNESIUM: CPT | Performed by: STUDENT IN AN ORGANIZED HEALTH CARE EDUCATION/TRAINING PROGRAM

## 2024-04-23 PROCEDURE — 20600001 HC STEP DOWN PRIVATE ROOM

## 2024-04-23 PROCEDURE — 63600175 PHARM REV CODE 636 W HCPCS: Performed by: STUDENT IN AN ORGANIZED HEALTH CARE EDUCATION/TRAINING PROGRAM

## 2024-04-23 RX ORDER — POTASSIUM CHLORIDE 750 MG/1
10 CAPSULE, EXTENDED RELEASE ORAL ONCE
Status: COMPLETED | OUTPATIENT
Start: 2024-04-23 | End: 2024-04-23

## 2024-04-23 RX ORDER — FUROSEMIDE 10 MG/ML
80 INJECTION INTRAMUSCULAR; INTRAVENOUS ONCE
Status: COMPLETED | OUTPATIENT
Start: 2024-04-23 | End: 2024-04-23

## 2024-04-23 RX ORDER — SODIUM CHLORIDE 9 MG/ML
INJECTION, SOLUTION INTRAVENOUS
Status: CANCELLED | OUTPATIENT
Start: 2024-04-23

## 2024-04-23 RX ORDER — SODIUM CHLORIDE 9 MG/ML
INJECTION, SOLUTION INTRAVENOUS ONCE
Status: CANCELLED | OUTPATIENT
Start: 2024-04-23 | End: 2024-04-23

## 2024-04-23 RX ADMIN — FUROSEMIDE 80 MG: 10 INJECTION, SOLUTION INTRAVENOUS at 09:04

## 2024-04-23 RX ADMIN — CEFTRIAXONE 2 G: 2 INJECTION, POWDER, FOR SOLUTION INTRAMUSCULAR; INTRAVENOUS at 10:04

## 2024-04-23 RX ADMIN — GABAPENTIN 100 MG: 100 CAPSULE ORAL at 09:04

## 2024-04-23 RX ADMIN — HYDRALAZINE HYDROCHLORIDE 75 MG: 50 TABLET ORAL at 10:04

## 2024-04-23 RX ADMIN — POTASSIUM CHLORIDE 10 MEQ: 10 CAPSULE, COATED, EXTENDED RELEASE ORAL at 09:04

## 2024-04-23 RX ADMIN — SODIUM BICARBONATE 325 MG: 325 TABLET ORAL at 08:04

## 2024-04-23 RX ADMIN — GABAPENTIN 100 MG: 100 CAPSULE ORAL at 08:04

## 2024-04-23 RX ADMIN — FAMOTIDINE 20 MG: 20 TABLET ORAL at 09:04

## 2024-04-23 RX ADMIN — MUPIROCIN: 20 OINTMENT TOPICAL at 09:04

## 2024-04-23 RX ADMIN — AMPICILLIN 2 G: 2 INJECTION, POWDER, FOR SOLUTION INTRAMUSCULAR; INTRAVENOUS at 08:04

## 2024-04-23 RX ADMIN — HYDRALAZINE HYDROCHLORIDE 75 MG: 50 TABLET ORAL at 05:04

## 2024-04-23 RX ADMIN — TRAZODONE HYDROCHLORIDE 25 MG: 50 TABLET ORAL at 08:04

## 2024-04-23 RX ADMIN — HYDRALAZINE HYDROCHLORIDE 75 MG: 50 TABLET ORAL at 08:04

## 2024-04-23 RX ADMIN — Medication 400 MG: at 09:04

## 2024-04-23 RX ADMIN — SODIUM BICARBONATE 325 MG: 325 TABLET ORAL at 09:04

## 2024-04-23 RX ADMIN — SODIUM BICARBONATE 325 MG: 325 TABLET ORAL at 02:04

## 2024-04-23 RX ADMIN — AMIODARONE HYDROCHLORIDE 400 MG: 200 TABLET ORAL at 09:04

## 2024-04-23 RX ADMIN — CEFTRIAXONE 2 G: 2 INJECTION, POWDER, FOR SOLUTION INTRAMUSCULAR; INTRAVENOUS at 09:04

## 2024-04-23 RX ADMIN — ACETAMINOPHEN 1000 MG: 500 TABLET ORAL at 04:04

## 2024-04-23 RX ADMIN — MUPIROCIN 1 TUBE: 20 OINTMENT TOPICAL at 08:04

## 2024-04-23 RX ADMIN — HYDRALAZINE HYDROCHLORIDE 75 MG: 50 TABLET ORAL at 01:04

## 2024-04-23 RX ADMIN — VENLAFAXINE 37.5 MG: 37.5 TABLET ORAL at 09:04

## 2024-04-23 RX ADMIN — PHYTONADIONE 2.5 MG: 10 INJECTION, EMULSION INTRAMUSCULAR; INTRAVENOUS; SUBCUTANEOUS at 01:04

## 2024-04-23 RX ADMIN — AMPICILLIN 2 G: 2 INJECTION, POWDER, FOR SOLUTION INTRAMUSCULAR; INTRAVENOUS at 09:04

## 2024-04-23 NOTE — PT/OT/SLP PROGRESS
Occupational Therapy      Patient Name:  Radha Abbott   MRN:  [id number]    Patient not seen today secondary to pt found up in chair, asleep and difficult to arouse. Once pt awake, he declined therapy stating he was too fatigued 2* not sleeping at all that night . Will follow-up at a later time.    4/23/2024

## 2024-04-23 NOTE — PLAN OF CARE
Patient cooperative and pleasant with routine care and procedures.  Fall precautions reviewed and patient verbalized understanding.  Respiratory isolation maintained.  Resting quietly and without complaints.  Labs and CVP pending in am.  Will continue to monitor.

## 2024-04-23 NOTE — CARE UPDATE
Care Update:     No acute events overnight. Patient in room 311/311 A. Blood glucose stable on current inpatient regimen.No hypoglycemia noted over the past 24-hours. Endocrine will continue to follow and manage insulin orders inpatient.       Steroid use- None.   Renal function-   Lab Results   Component Value Date    CREATININE 5.4 (H) 04/23/2024        Diet Renal Coumadin Restriction; Fluid - 1500mL; Isolation Tray - Regular [place]     POCT Glucose   Date Value Ref Range Status   04/23/2024 133 (H) 70 - 110 mg/dL Final   04/22/2024 181 (H) 70 - 110 mg/dL Final   04/22/2024 184 (H) 70 - 110 mg/dL Final   04/22/2024 118 (H) 70 - 110 mg/dL Final   04/21/2024 163 (H) 70 - 110 mg/dL Final   04/21/2024 119 (H) 70 - 110 mg/dL Final   04/20/2024 145 (H) 70 - 110 mg/dL Final   04/20/2024 138 (H) 70 - 110 mg/dL Final   04/20/2024 179 (H) 70 - 110 mg/dL Final     Lab Results   Component Value Date    HGBA1C 7.4 (H) 04/17/2024       Endocrine  Type 2 diabetes mellitus without complication, without long-term current use of insulin  Endocrinology consulted for BG management.   BG goal 140-180     - Novolog (Insulin Aspart) prn for BG excursions LDC SSI (150/50)  - BG checks AC/HS  - Hypoglycemia protocol in place     ** Please notify Endocrine for any change and/or advance in diet**  ** Please call Endocrine for any BG related issues **     Discharge Planning:   TBD. Please notify endocrinology prior to discharge.

## 2024-04-23 NOTE — SUBJECTIVE & OBJECTIVE
Interval History:   NAEON. No acute complaints. Underwent HD yesterday w/ 1L removal.     Continuous Infusions:  Current Facility-Administered Medications   Medication Dose Route Frequency Last Rate Last Admin     Scheduled Meds:  Current Facility-Administered Medications   Medication Dose Route Frequency    amiodarone  400 mg Oral Daily    ampicillin IV (PEDS and ADULTS)  2 g Intravenous Q12H    cefTRIAXone (Rocephin) IV (PEDS and ADULTS)  2 g Intravenous Q12H    famotidine  20 mg Oral Daily    gabapentin  100 mg Oral BID    hydrALAZINE  75 mg Oral Q8H    magnesium oxide  400 mg Oral Daily    mupirocin   Nasal BID    ondansetron  4 mg Intravenous Once    phytonadione  2.5 mg Oral Once    sodium bicarbonate  325 mg Oral TID    traZODone  25 mg Oral QHS    venlafaxine  37.5 mg Oral Daily     PRN Meds:  Current Facility-Administered Medications:     acetaminophen, 1,000 mg, Oral, Q8H PRN    dextrose 10%, 12.5 g, Intravenous, PRN    dextrose 10%, 25 g, Intravenous, PRN    glucagon (human recombinant), 1 mg, Intramuscular, PRN    glucose, 16 g, Oral, PRN    glucose, 24 g, Oral, PRN    insulin aspart U-100, 0-5 Units, Subcutaneous, QID (AC + HS) PRN    senna-docusate 8.6-50 mg, 2 tablet, Oral, Daily PRN    Review of patient's allergies indicates:  No Known Allergies  Objective:     Vital Signs (Most Recent):  Temp: 97.7 °F (36.5 °C) (04/23/24 0806)  Pulse: 68 (04/23/24 0806)  Resp: 18 (04/23/24 0806)  BP: 98/71 (04/23/24 0806)  SpO2: (!) 93 % (04/23/24 0806) Vital Signs (24h Range):  Temp:  [97.6 °F (36.4 °C)-98.5 °F (36.9 °C)] 97.7 °F (36.5 °C)  Pulse:  [68-89] 68  Resp:  [15-18] 18  SpO2:  [93 %-99 %] 93 %  BP: ()/(0-87) 98/71     Patient Vitals for the past 72 hrs (Last 3 readings):   Weight   04/23/24 0806 77.4 kg (170 lb 10.2 oz)   04/22/24 0522 77.5 kg (170 lb 13.7 oz)   04/21/24 0504 76.1 kg (167 lb 12.3 oz)     Body mass index is 21.91 kg/m².      Intake/Output Summary (Last 24 hours) at 4/23/2024  0958  Last data filed at 4/23/2024 0532  Gross per 24 hour   Intake 1562.61 ml   Output 1751 ml   Net -188.39 ml       Hemodynamic Parameters:  CVP:  [7 mmHg] 7 mmHg    Telemetry: SR with PVC's       Physical Exam  Constitutional:       Appearance: He is ill-appearing.   HENT:      Head: Normocephalic and atraumatic.   Eyes:      Conjunctiva/sclera: Conjunctivae normal.      Pupils: Pupils are equal, round, and reactive to light.   Neck:      Comments: JVP not visible on exam  Cardiovascular:      Rate and Rhythm: Normal rate and regular rhythm.      Comments: Smooth VAD hum  Pulmonary:      Effort: Pulmonary effort is normal.      Comments: Breath sounds are decreased right base. Lungs essentially CTA bilaterally  Abdominal:      General: Bowel sounds are normal.      Palpations: Abdomen is soft.   Musculoskeletal:         General: No swelling. Normal range of motion.      Cervical back: Normal range of motion and neck supple.   Skin:     General: Skin is warm and dry.      Capillary Refill: Capillary refill takes 2 to 3 seconds.   Neurological:      General: No focal deficit present.      Mental Status: He is alert and oriented to person, place, and time.            Significant Labs:  CBC:  Recent Labs   Lab 04/21/24  0640 04/21/24  1255 04/22/24  0530 04/23/24  0422   WBC 5.29  --  5.14 4.24   RBC 3.04*  --  2.80* 2.78*   HGB 8.6*  --  8.1* 8.0*   HCT 26.3* 27* 23.3* 23.3*     --  310 292   MCV 87  --  83 84   MCH 28.3  --  28.9 28.8   MCHC 32.7  --  34.8 34.3     BNP:  Recent Labs   Lab 04/17/24  0409 04/19/24  0530 04/22/24  0530   * 409* 1,116*     CMP:  Recent Labs   Lab 04/17/24  0409 04/18/24  0534 04/19/24  0530 04/20/24  0234 04/21/24  1752 04/22/24  0530 04/23/24  0422   *   < > 104   < > 161* 105 133*   CALCIUM 9.5   < > 9.2   < > 8.4* 8.5* 8.7   ALBUMIN 3.2*  --  3.0*  --   --  2.5*  --    PROT 8.2  --  8.2  --   --  6.9  --    *   < > 135*   < > 131* 129* 136   K 3.9   < >  3.6   < > 3.4* 3.5 3.9   CO2 25   < > 24   < > 22* 21* 23   CL 94*   < > 92*   < > 90* 91* 102   BUN 82*   < > 98*   < > 113* 114* 58*   CREATININE 4.3*   < > 6.0*   < > 8.2* 8.6* 5.4*   ALKPHOS 143*  --  139*  --   --  154*  --    *  --  516*  --   --  230*  --    AST 88*  --  644*  --   --  168*  --    BILITOT 0.5  --  0.8  --   --  0.2  --     < > = values in this interval not displayed.      Coagulation:   Recent Labs   Lab 04/21/24  0640 04/22/24  0530 04/23/24 0422   INR 4.0* 6.1* 6.9*   APTT 56.8* 68.6* 65.1*     LDH:  Recent Labs   Lab 04/21/24  0640 04/22/24  0530 04/23/24  0422   * 409* 404*     Microbiology:  Microbiology Results (last 7 days)       Procedure Component Value Units Date/Time    Blood culture [[id number]] Collected: 04/20/24 0234    Order Status: Completed Specimen: Blood Updated: 04/23/24 0612     Blood Culture, Routine No Growth to date      No Growth to date      No Growth to date      No Growth to date    Blood culture [[id number]] Collected: 04/20/24 0240    Order Status: Completed Specimen: Blood Updated: 04/23/24 0612     Blood Culture, Routine No Growth to date      No Growth to date      No Growth to date      No Growth to date    Blood culture [[id number]] Collected: 04/18/24 0535    Order Status: Completed Specimen: Blood Updated: 04/22/24 1012     Blood Culture, Routine No Growth to date      No Growth to date      No Growth to date      No Growth to date      No Growth to date    Blood culture [[id number]] Collected: 04/18/24 0534    Order Status: Completed Specimen: Blood Updated: 04/22/24 1012     Blood Culture, Routine No Growth to date      No Growth to date      No Growth to date      No Growth to date      No Growth to date    Blood culture x two cultures. Draw prior to antibiotics. [[id number]]  (Abnormal) Collected: 04/16/24 3027    Order Status: Completed Specimen: Blood from Peripheral, Hand, Right Updated: 04/19/24 1501     Blood Culture, Routine Gram  stain aer bottle: Gram positive cocci      Gram stain nata bottle: Gram positive cocci      Positive results previously called 04/17/2024 15:08      ENTEROCOCCUS FAECALIS  For susceptibility see order #[id number]      Narrative:      Aerobic and anaerobic    Blood culture x two cultures. Draw prior to antibiotics. [[id number]]  (Abnormal)  (Susceptibility) Collected: 04/16/24 2322    Order Status: Completed Specimen: Blood from Peripheral, Hand, Left Updated: 04/19/24 1500     Blood Culture, Routine Gram stain aer bottle: Gram positive cocci      Gram stain nata bottle: Gram positive cocci      Results called to and read back by: Rachael Horan RN 04/17/2024  15:08      ENTEROCOCCUS FAECALIS    Narrative:      Aerobic and anaerobic    Rapid Organism ID by PCR (from Blood culture) [[id number]]  (Abnormal) Collected: 04/16/24 2322    Order Status: Completed Updated: 04/17/24 1726     Enterococcus faecalis Detected     Enterococcus faecium Not Detected     Listeria monocytogenes Not Detected     Staphylococcus spp. Not Detected     Staphylococcus aureus Not Detected     Staphylococcus epidermidis Not Detected     Staphylococcus lugdunensis Not Detected     Streptococcus species Not Detected     Streptococcus agalactiae Not Detected     Streptococcus pneumoniae Not Detected     Streptococcus pyogenes Not Detected     Acinetobacter calcoaceticus/baumannii complex Not Detected     Bacteroides fragilis Not Detected     Enterobacterales Not Detected     Enterobacter cloacae complex Not Detected     Escherichia coli Not Detected     Klebsiella aerogenes Not Detected     Klebsiella oxytoca Not Detected     Klebsiella pneumoniae group Not Detected     Proteus Not Detected     Salmonella sp Not Detected     Serratia marcescens Not Detected     Haemophilus influenzae Not Detected     Neisseria meningtidis Not Detected     Pseudomonas aeruginosa Not Detected     Stenotrophomonas maltophilia Not Detected     Candida albicans Not  Detected     Candida auris Not Detected     Candida glabrata Not Detected     Candida krusei Not Detected     Candida parapsilosis Not Detected     Candida tropicalis Not Detected     Cryptococcus neoformans/gattii Not Detected     CTX-M (ESBL ) Test Not Applicable     IMP (Carbapenem resistant) Test Not Applicable     KPC resistance gene (Carbapenem resistant) Test Not Applicable     mcr-1  Test Not Applicable     mec A/C  Test Not Applicable     mec A/C and MREJ (MRSA) gene Test Not Applicable     NDM (Carbapenem resistant) Test Not Applicable     OXA-48-like (Carbapenem resistant) Test Not Applicable     van A/B (VRE gene) Not Detected     VIM (Carbapenem resistant) Test Not Applicable    Narrative:      Aerobic and anaerobic    Influenza A & B by Molecular [[id number]] Collected: 04/16/24 4867    Order Status: Completed Specimen: Nasopharyngeal Swab Updated: 04/17/24 0059     Influenza A, Molecular Negative     Influenza B, Molecular Negative     Flu A & B Source Nasal swab            I have reviewed all pertinent labs within the past 24 hours.    Estimated Creatinine Clearance: 15.5 mL/min (A) (based on SCr of 5.4 mg/dL (H)).    Diagnostic Results:  I have reviewed and interpreted all pertinent imaging results/findings within the past 24 hours.

## 2024-04-23 NOTE — PROGRESS NOTES
04/23/2024  [place]    Current provider:  Brayan Ocampo MD    Device interrogation:      4/23/2024     4:00 AM 4/22/2024    11:25 PM 4/22/2024     8:00 PM 4/22/2024     3:41 PM 4/22/2024    12:00 PM 4/22/2024     8:00 AM 4/22/2024     3:56 AM   TXP LVAD INTERROGATIONS   Type HeartMate3 HeartMate3 HeartMate3 HeartMate II HeartMate3 HeartMate3 HeartMate3   Flow 3.9 3.6 3.8 3.9 3.9 3.7 3.6   Speed 5000 5000 5000 5000 5000 5000 5000   PI 5.3 6 6.3 5.4 5.7 5.9 5.9   Power ([place]) 3.5 3.5 3.4 3.4 3.5 3.5 3.4   LSL   4600 4600 4600 4600 4600   Pulsatility Intermittent pulse Intermittent pulse Intermittent pulse  Intermittent pulse Intermittent pulse Intermittent pulse          Rounded on [facility] to ensure all mechanical assist device settings (IABP or VAD) were appropriate and all parameters were within limits.  I was able to ensure all back up equipment was present, the staff had no issues, and the Perfusion Department daily rounding was complete.      For implantable VADs: Interrogation of Ventricular assist device was performed with analysis of device parameters and review of device function. I have personally reviewed the interrogation findings and agree with findings as stated.     In emergency, the nursing units have been notified to contact the perfusion department either by:  Calling [phone] from 630am to 4pm Mon thru Fri, utilizing the On-Call Finder functionality of Epic and searching for Perfusion, or by contacting the hospital  from 4pm to 630am and on weekends and asking to speak with the perfusionist on call.    7:03 AM

## 2024-04-23 NOTE — PROGRESS NOTES
[facility] - Cardiology Stepdown  Heart Transplant  Progress Note    Patient Name: Radha Abbott  MRN: [id number]  Admission Date: 4/16/2024  Hospital Length of Stay: 7 days  Attending Physician: Brayan Ocampo MD  Primary Care Provider: Vasu Kong MD  Principal Problem:Pneumonia due to COVID-19 virus    Subjective:   Interval History:   NAEON. No acute complaints. Underwent HD yesterday w/ 1L removal.     Continuous Infusions:  Current Facility-Administered Medications   Medication Dose Route Frequency Last Rate Last Admin     Scheduled Meds:  Current Facility-Administered Medications   Medication Dose Route Frequency    amiodarone  400 mg Oral Daily    ampicillin IV (PEDS and ADULTS)  2 g Intravenous Q12H    cefTRIAXone (Rocephin) IV (PEDS and ADULTS)  2 g Intravenous Q12H    famotidine  20 mg Oral Daily    gabapentin  100 mg Oral BID    hydrALAZINE  75 mg Oral Q8H    magnesium oxide  400 mg Oral Daily    mupirocin   Nasal BID    ondansetron  4 mg Intravenous Once    phytonadione  2.5 mg Oral Once    sodium bicarbonate  325 mg Oral TID    traZODone  25 mg Oral QHS    venlafaxine  37.5 mg Oral Daily     PRN Meds:  Current Facility-Administered Medications:     acetaminophen, 1,000 mg, Oral, Q8H PRN    dextrose 10%, 12.5 g, Intravenous, PRN    dextrose 10%, 25 g, Intravenous, PRN    glucagon (human recombinant), 1 mg, Intramuscular, PRN    glucose, 16 g, Oral, PRN    glucose, 24 g, Oral, PRN    insulin aspart U-100, 0-5 Units, Subcutaneous, QID (AC + HS) PRN    senna-docusate 8.6-50 mg, 2 tablet, Oral, Daily PRN    Review of patient's allergies indicates:  No Known Allergies  Objective:     Vital Signs (Most Recent):  Temp: 97.7 °F (36.5 °C) (04/23/24 0806)  Pulse: 68 (04/23/24 0806)  Resp: 18 (04/23/24 0806)  BP: 98/71 (04/23/24 0806)  SpO2: (!) 93 % (04/23/24 0806) Vital Signs (24h Range):  Temp:  [97.6 °F (36.4 °C)-98.5 °F (36.9 °C)] 97.7 °F (36.5 °C)  Pulse:  [68-89] 68  Resp:  [15-18] 18  SpO2:  [93 %-99  %] 93 %  BP: ()/(0-87) 98/71     Patient Vitals for the past 72 hrs (Last 3 readings):   Weight   04/23/24 0806 77.4 kg (170 lb 10.2 oz)   04/22/24 0522 77.5 kg (170 lb 13.7 oz)   04/21/24 0504 76.1 kg (167 lb 12.3 oz)     Body mass index is 21.91 kg/m².      Intake/Output Summary (Last 24 hours) at 4/23/2024 0958  Last data filed at 4/23/2024 0532  Gross per 24 hour   Intake 1562.61 ml   Output 1751 ml   Net -188.39 ml       Hemodynamic Parameters:  CVP:  [7 mmHg] 7 mmHg    Telemetry: SR with PVC's       Physical Exam  Constitutional:       Appearance: He is ill-appearing.   HENT:      Head: Normocephalic and atraumatic.   Eyes:      Conjunctiva/sclera: Conjunctivae normal.      Pupils: Pupils are equal, round, and reactive to light.   Neck:      Comments: JVP not visible on exam  Cardiovascular:      Rate and Rhythm: Normal rate and regular rhythm.      Comments: Smooth VAD hum  Pulmonary:      Effort: Pulmonary effort is normal.      Comments: Breath sounds are decreased right base. Lungs essentially CTA bilaterally  Abdominal:      General: Bowel sounds are normal.      Palpations: Abdomen is soft.   Musculoskeletal:         General: No swelling. Normal range of motion.      Cervical back: Normal range of motion and neck supple.   Skin:     General: Skin is warm and dry.      Capillary Refill: Capillary refill takes 2 to 3 seconds.   Neurological:      General: No focal deficit present.      Mental Status: He is alert and oriented to person, place, and time.            Significant Labs:  CBC:  Recent Labs   Lab 04/21/24  0640 04/21/24  1255 04/22/24  0530 04/23/24  0422   WBC 5.29  --  5.14 4.24   RBC 3.04*  --  2.80* 2.78*   HGB 8.6*  --  8.1* 8.0*   HCT 26.3* 27* 23.3* 23.3*     --  310 292   MCV 87  --  83 84   MCH 28.3  --  28.9 28.8   MCHC 32.7  --  34.8 34.3     BNP:  Recent Labs   Lab 04/17/24  0409 04/19/24  0530 04/22/24  0530   * 409* 1,116*     CMP:  Recent Labs   Lab  04/17/24  0409 04/18/24  0534 04/19/24  0530 04/20/24  0234 04/21/24  1752 04/22/24  0530 04/23/24  0422   *   < > 104   < > 161* 105 133*   CALCIUM 9.5   < > 9.2   < > 8.4* 8.5* 8.7   ALBUMIN 3.2*  --  3.0*  --   --  2.5*  --    PROT 8.2  --  8.2  --   --  6.9  --    *   < > 135*   < > 131* 129* 136   K 3.9   < > 3.6   < > 3.4* 3.5 3.9   CO2 25   < > 24   < > 22* 21* 23   CL 94*   < > 92*   < > 90* 91* 102   BUN 82*   < > 98*   < > 113* 114* 58*   CREATININE 4.3*   < > 6.0*   < > 8.2* 8.6* 5.4*   ALKPHOS 143*  --  139*  --   --  154*  --    *  --  516*  --   --  230*  --    AST 88*  --  644*  --   --  168*  --    BILITOT 0.5  --  0.8  --   --  0.2  --     < > = values in this interval not displayed.      Coagulation:   Recent Labs   Lab 04/21/24 0640 04/22/24  0530 04/23/24 0422   INR 4.0* 6.1* 6.9*   APTT 56.8* 68.6* 65.1*     LDH:  Recent Labs   Lab 04/21/24 0640 04/22/24  0530 04/23/24 0422   * 409* 404*     Microbiology:  Microbiology Results (last 7 days)       Procedure Component Value Units Date/Time    Blood culture [[id number]] Collected: 04/20/24 0234    Order Status: Completed Specimen: Blood Updated: 04/23/24 0612     Blood Culture, Routine No Growth to date      No Growth to date      No Growth to date      No Growth to date    Blood culture [[id number]] Collected: 04/20/24 0240    Order Status: Completed Specimen: Blood Updated: 04/23/24 0612     Blood Culture, Routine No Growth to date      No Growth to date      No Growth to date      No Growth to date    Blood culture [[id number]] Collected: 04/18/24 0535    Order Status: Completed Specimen: Blood Updated: 04/22/24 1012     Blood Culture, Routine No Growth to date      No Growth to date      No Growth to date      No Growth to date      No Growth to date    Blood culture [[id number]] Collected: 04/18/24 0534    Order Status: Completed Specimen: Blood Updated: 04/22/24 1012     Blood Culture, Routine No Growth to date       No Growth to date      No Growth to date      No Growth to date      No Growth to date    Blood culture x two cultures. Draw prior to antibiotics. [[id number]]  (Abnormal) Collected: 04/16/24 2322    Order Status: Completed Specimen: Blood from Peripheral, Hand, Right Updated: 04/19/24 1501     Blood Culture, Routine Gram stain aer bottle: Gram positive cocci      Gram stain nata bottle: Gram positive cocci      Positive results previously called 04/17/2024 15:08      ENTEROCOCCUS FAECALIS  For susceptibility see order #[id number]      Narrative:      Aerobic and anaerobic    Blood culture x two cultures. Draw prior to antibiotics. [[id number]]  (Abnormal)  (Susceptibility) Collected: 04/16/24 2322    Order Status: Completed Specimen: Blood from Peripheral, Hand, Left Updated: 04/19/24 1500     Blood Culture, Routine Gram stain aer bottle: Gram positive cocci      Gram stain nata bottle: Gram positive cocci      Results called to and read back by: Rachael Horan RN 04/17/2024  15:08      ENTEROCOCCUS FAECALIS    Narrative:      Aerobic and anaerobic    Rapid Organism ID by PCR (from Blood culture) [[id number]]  (Abnormal) Collected: 04/16/24 2322    Order Status: Completed Updated: 04/17/24 1726     Enterococcus faecalis Detected     Enterococcus faecium Not Detected     Listeria monocytogenes Not Detected     Staphylococcus spp. Not Detected     Staphylococcus aureus Not Detected     Staphylococcus epidermidis Not Detected     Staphylococcus lugdunensis Not Detected     Streptococcus species Not Detected     Streptococcus agalactiae Not Detected     Streptococcus pneumoniae Not Detected     Streptococcus pyogenes Not Detected     Acinetobacter calcoaceticus/baumannii complex Not Detected     Bacteroides fragilis Not Detected     Enterobacterales Not Detected     Enterobacter cloacae complex Not Detected     Escherichia coli Not Detected     Klebsiella aerogenes Not Detected     Klebsiella oxytoca Not Detected      Klebsiella pneumoniae group Not Detected     Proteus Not Detected     Salmonella sp Not Detected     Serratia marcescens Not Detected     Haemophilus influenzae Not Detected     Neisseria meningtidis Not Detected     Pseudomonas aeruginosa Not Detected     Stenotrophomonas maltophilia Not Detected     Candida albicans Not Detected     Candida auris Not Detected     Candida glabrata Not Detected     Candida krusei Not Detected     Candida parapsilosis Not Detected     Candida tropicalis Not Detected     Cryptococcus neoformans/gattii Not Detected     CTX-M (ESBL ) Test Not Applicable     IMP (Carbapenem resistant) Test Not Applicable     KPC resistance gene (Carbapenem resistant) Test Not Applicable     mcr-1  Test Not Applicable     mec A/C  Test Not Applicable     mec A/C and MREJ (MRSA) gene Test Not Applicable     NDM (Carbapenem resistant) Test Not Applicable     OXA-48-like (Carbapenem resistant) Test Not Applicable     van A/B (VRE gene) Not Detected     VIM (Carbapenem resistant) Test Not Applicable    Narrative:      Aerobic and anaerobic    Influenza A & B by Molecular [[id number]] Collected: 04/16/24 2347    Order Status: Completed Specimen: Nasopharyngeal Swab Updated: 04/17/24 0059     Influenza A, Molecular Negative     Influenza B, Molecular Negative     Flu A & B Source Nasal swab            I have reviewed all pertinent labs within the past 24 hours.    Estimated Creatinine Clearance: 15.5 mL/min (A) (based on SCr of 5.4 mg/dL (H)).    Diagnostic Results:  I have reviewed and interpreted all pertinent imaging results/findings within the past 24 hours.  Assessment and Plan:     Mr. Radha Abbott is a very pleasant 65 yo male with stage D HFrEF, ICMP with previous admission for ADHF/CS. He did undergo HM3 placement placed by Dr Washington on 12/1/23. Lengthy post op course complicated by vasoplegia(requiring Giaprezza), debility, malnutrition/impaired swallowing, and renal failure requiring HD(since  weaned off). Once medically stable, he was transferred to Rehab for further PT/OT/ST. Of note, pt left with permacath still in place. He has done really well in rehab and now has continued to progress well at home. He is on a very high dose of Bumex 4 mg twice daily. VAD speed is at 5000 rpm. No VAD alarms noted on interrogation occasional PI events . Today he presents to the ED for infectious workup due to complaints of fevers of 101.5F associated with fatigue, generalized weakness, lower back pain and HA. He also reports a decline in appetite. Wife changes his driveline dressing every Saturday and has not noticed any purulance with dressing changes. Body aches/weakness started around 2am last night followed by fevers around 5pm. He has been taking tylenol which has helped brng down fevers but has not helped with HA. HA is 5/10 in intensity. He reports slight increase in body weight/ fluid retention yesterday for which he took a dose of Metolazone with a drop in weight by ~5lbs. Denies any SOB, LE swelling at this time.      2D Echo with CFD done on 3/26/2024  LVAD: There is a Heartmate III LVAD running at 5000 RPM. The aortic valve does not open. The ventricular septum is at midline. Inflow cannula well seated at the apex. Outflow graft not visualized.    Left Ventricle: The left ventricle is moderately dilated. Normal wall thickness. Global hypokinesis present. Septal motion is abnormal. There is severely reduced systolic function with a visually estimated ejection fraction of 5 - 10%. There is diastolic dysfunction but grade cannot be determined.    Right Ventricle: Mild right ventricular enlargement. Wall thickness is normal. Right ventricle wall motion  is normal. Systolic function is normal. Pacemaker lead present in the ventricle.    Left Atrium: Left atrium is severely dilated.    Right Atrium: Right atrium is moderately dilated.    Mitral Valve: The mitral valve is repaired with an Noble stitch. There is  mild regurgitation.    IVC/SVC: Normal venous pressure at 3 mmHg.    * Pneumonia due to COVID-19 virus  -4/16/24 he presented to the ED for infectious workup due to complaints of fevers of 101.5F associated with fatigue, generalized weakness, lower back pain and HA that started this morning. No evidence of drive line infection. No history of line infection.   - COVID +ve  - CT chest/ abdomen / pelvis 4/16 showed new RML opacity  - S/p remdesavir.   - continue tylenol for fevers    IVÁN (acute kidney injury)  Most likely 2/2 to ATN on CKD IV. Worsening. Expect creatinine to peak and come down.     - HD as per nephrology.   - Will give diuretic challenge w/ IVP lasix 80mg.   - Will continue to monitor.  - Nephrology consulted. Appreciate recs.         Bacteremia  -blood cxs from 4/16 +ve for E faecalis   -ID following  -E faecalis is a known cause of endocarditis and AICD lead infections.   -TTE was negative for vegetation.  However TTE does not rule out valvular or lead vegetation. Will treat this as endocarditis with 6 weeks of IV antibiotics followed by suppressive therapy   -Currently on Ampicillin and Ceftriaxone for 6wk course as per ID.   -Repeat cultures from 4/20 are negative thus far    Hyponatremia  -POA  -Daily labs       Hypertension  Continue hydralazine    Headache  C/o of HA on admit with no improvement despite tylenol  - CTH done 4/16 with no acute processes    LVAD (left ventricular assist device) present  -HeartMate 3 Implanted  12/1/2023  as DT  -Cont Coumadin, dosing by PharmD.  Goal INR 2.0-3.0 . Supratherapeutic today.  Will hold coumadin. Will give 1 dose of PO vitamin K.   -Antiplatelets Not on ASA due to low H/H on implant   -LDH is stable overall today. Will continue to monitor daily.  -Speed set at  5000     -Interrogation notable for no events  -ECHO 4/18/24 with speed at 5000 EF: 10-15%, LVEDD: 6.1cm, RV size normal function mod reduced   -RHC 1/22/24 with RA 13, PCWP 22, PA 50/25, CO 5.8,  CI 3.0   -Not listed for OHTx, declined for OHTX due to comorbidities     Procedure: Device Interrogation Including analysis of device parameters  Current Settings: Ventricular Assist Device  Review of device function is stable/unstable stable        4/23/2024     7:40 AM 4/23/2024     7:34 AM 4/23/2024     4:00 AM 4/22/2024    11:25 PM 4/22/2024     8:00 PM 4/22/2024     3:41 PM 4/22/2024    12:00 PM   TXP LVAD INTERROGATIONS   Type HeartMate3 HeartMate3 HeartMate3 HeartMate3 HeartMate3 HeartMate II HeartMate3   Flow 3.7  3.9 3.6 3.8 3.9 3.9   Speed 5000  5000 5000 5000 5000 5000   PI 6.2  5.3 6 6.3 5.4 5.7   Power ([place]) 3.5  3.5 3.5 3.4 3.4 3.5   LSL 4600    4600 4600 4600   Pulsatility Intermittent pulse  Intermittent pulse Intermittent pulse Intermittent pulse  Intermittent pulse         Type 2 diabetes mellitus without complication, without long-term current use of insulin  -SSI    Chronic combined systolic and diastolic heart failure  -ICM  -Last 2D Echo  4/18/24 : LVEF 10-15%, LVEDD  6.1 cm  -Euvolemic on examination today  -Was on Bumex 4mg BID at home.  Holding due to IVÁN   -GDMT with Hydralazine  -2g Na dietary restriction, 1500 mL fluid restriction, strict I/Os      Ventricular tachycardia  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        Angela Shen MD  Heart Transplant  [facility] - Cardiology Step[facility]

## 2024-04-23 NOTE — PLAN OF CARE
.Nephrology Chart Review  HD done yesterday, 1 liter net UF removed, will monitor renal functions daily for recovery/RRT needs.    Intake/Output Summary (Last 24 hours) at 4/23/2024 0753  Last data filed at 4/23/2024 0532  Gross per 24 hour   Intake 1802.61 ml   Output 1751 ml   Net 51.61 ml       Vitals:    04/23/24 0359 04/23/24 0530 04/23/24 0600 04/23/24 0700   BP: (!) 90/0   (!) 92/0   BP Location: Left arm   Left arm   Patient Position: Lying   Sitting   Pulse:  80 78    Resp:       Temp:       TempSrc:       SpO2:       Weight:       Height:           Recent Labs   Lab 04/21/24  0640 04/21/24  1752 04/22/24  0530 04/23/24  0422   * 131* 129* 136   K 3.4* 3.4* 3.5 3.9   CL 91* 90* 91* 102   CO2 21* 22* 21* 23   * 113* 114* 58*   CREATININE 8.3* 8.2* 8.6* 5.4*   CALCIUM 8.7 8.4* 8.5* 8.7   PHOS 7.0*  --  6.9* 3.3           No other related issues identified. Please call Nephrology as needed; We will continue to follow.        Kamilla Angela MD  Nephrology Fellow

## 2024-04-23 NOTE — NURSING
Patient with c/o feeling full in his bladder.  Bladder scan done with 81 cc noted.  Then patient c/o headache 4/10 on pain scale. No other symptoms.  Dr. Werner notified and orders received for stat head CT.  CT pending.  Will continue to monitor.

## 2024-04-23 NOTE — PROGRESS NOTES
04/23/24 0700   Vital Signs   BP (!) 92/0   BP Location Left arm   BP Method Doppler   Patient Position Sitting     MD. Shen notified. Orders to recheck in one hour.

## 2024-04-24 PROBLEM — I50.21 ACUTE SYSTOLIC HEART FAILURE: Status: ACTIVE | Noted: 2024-04-24

## 2024-04-24 LAB
ALBUMIN SERPL BCP-MCNC: 2.5 G/DL (ref 3.5–5.2)
ALLENS TEST: ABNORMAL
ALP SERPL-CCNC: 201 U/L (ref 55–135)
ALT SERPL W/O P-5'-P-CCNC: 186 U/L (ref 10–44)
ANION GAP SERPL CALC-SCNC: 12 MMOL/L (ref 8–16)
APTT PPP: 60 SEC (ref 21–32)
AST SERPL-CCNC: 140 U/L (ref 10–40)
BASOPHILS # BLD AUTO: 0.03 K/UL (ref 0–0.2)
BASOPHILS NFR BLD: 0.7 % (ref 0–1.9)
BILIRUB DIRECT SERPL-MCNC: 0.1 MG/DL (ref 0.1–0.3)
BILIRUB SERPL-MCNC: 0.2 MG/DL (ref 0.1–1)
BNP SERPL-MCNC: 1263 PG/ML (ref 0–99)
BUN SERPL-MCNC: 57 MG/DL (ref 8–23)
CALCIUM SERPL-MCNC: 8.6 MG/DL (ref 8.7–10.5)
CHLORIDE SERPL-SCNC: 101 MMOL/L (ref 95–110)
CO2 SERPL-SCNC: 22 MMOL/L (ref 23–29)
CREAT SERPL-MCNC: 5.6 MG/DL (ref 0.5–1.4)
CRP SERPL-MCNC: 60.6 MG/L (ref 0–8.2)
DELSYS: ABNORMAL
DIFFERENTIAL METHOD BLD: ABNORMAL
EOSINOPHIL # BLD AUTO: 0.2 K/UL (ref 0–0.5)
EOSINOPHIL NFR BLD: 3.5 % (ref 0–8)
ERYTHROCYTE [DISTWIDTH] IN BLOOD BY AUTOMATED COUNT: 17.9 % (ref 11.5–14.5)
EST. GFR  (NO RACE VARIABLE): 10.8 ML/MIN/1.73 M^2
GLUCOSE SERPL-MCNC: 95 MG/DL (ref 70–110)
HCT VFR BLD AUTO: 24 % (ref 40–54)
HGB BLD-MCNC: 7.9 G/DL (ref 14–18)
IMM GRANULOCYTES # BLD AUTO: 0.05 K/UL (ref 0–0.04)
IMM GRANULOCYTES NFR BLD AUTO: 1.2 % (ref 0–0.5)
INR PPP: 3.7 (ref 0.8–1.2)
LDH SERPL L TO P-CCNC: 372 U/L (ref 110–260)
LYMPHOCYTES # BLD AUTO: 0.8 K/UL (ref 1–4.8)
LYMPHOCYTES NFR BLD: 19.5 % (ref 18–48)
MAGNESIUM SERPL-MCNC: 2.1 MG/DL (ref 1.6–2.6)
MCH RBC QN AUTO: 27.9 PG (ref 27–31)
MCHC RBC AUTO-ENTMCNC: 32.9 G/DL (ref 32–36)
MCV RBC AUTO: 85 FL (ref 82–98)
MODE: ABNORMAL
MONOCYTES # BLD AUTO: 0.4 K/UL (ref 0.3–1)
MONOCYTES NFR BLD: 9.8 % (ref 4–15)
NEUTROPHILS # BLD AUTO: 2.8 K/UL (ref 1.8–7.7)
NEUTROPHILS NFR BLD: 65.3 % (ref 38–73)
NRBC BLD-RTO: 0 /100 WBC
PHOSPHATE SERPL-MCNC: 3.9 MG/DL (ref 2.7–4.5)
PLATELET # BLD AUTO: 319 K/UL (ref 150–450)
PMV BLD AUTO: 10.2 FL (ref 9.2–12.9)
PO2 BLDA: 30 MMHG (ref 40–60)
POC SATURATED O2: 57 % (ref 95–100)
POCT GLUCOSE: 103 MG/DL (ref 70–110)
POCT GLUCOSE: 160 MG/DL (ref 70–110)
POCT GLUCOSE: 166 MG/DL (ref 70–110)
POCT GLUCOSE: 201 MG/DL (ref 70–110)
POCT GLUCOSE: 217 MG/DL (ref 70–110)
POTASSIUM SERPL-SCNC: 3.9 MMOL/L (ref 3.5–5.1)
PREALB SERPL-MCNC: 29 MG/DL (ref 20–43)
PROT SERPL-MCNC: 7.2 G/DL (ref 6–8.4)
PROTHROMBIN TIME: 37.2 SEC (ref 9–12.5)
RBC # BLD AUTO: 2.83 M/UL (ref 4.6–6.2)
SAMPLE: ABNORMAL
SITE: ABNORMAL
SODIUM SERPL-SCNC: 135 MMOL/L (ref 136–145)
WBC # BLD AUTO: 4.3 K/UL (ref 3.9–12.7)

## 2024-04-24 PROCEDURE — 86140 C-REACTIVE PROTEIN: CPT | Performed by: STUDENT IN AN ORGANIZED HEALTH CARE EDUCATION/TRAINING PROGRAM

## 2024-04-24 PROCEDURE — 84134 ASSAY OF PREALBUMIN: CPT | Performed by: STUDENT IN AN ORGANIZED HEALTH CARE EDUCATION/TRAINING PROGRAM

## 2024-04-24 PROCEDURE — 25000003 PHARM REV CODE 250: Performed by: INTERNAL MEDICINE

## 2024-04-24 PROCEDURE — 97530 THERAPEUTIC ACTIVITIES: CPT

## 2024-04-24 PROCEDURE — 85610 PROTHROMBIN TIME: CPT | Performed by: STUDENT IN AN ORGANIZED HEALTH CARE EDUCATION/TRAINING PROGRAM

## 2024-04-24 PROCEDURE — 93750 INTERROGATION VAD IN PERSON: CPT | Mod: ,,, | Performed by: INTERNAL MEDICINE

## 2024-04-24 PROCEDURE — 83880 ASSAY OF NATRIURETIC PEPTIDE: CPT | Performed by: STUDENT IN AN ORGANIZED HEALTH CARE EDUCATION/TRAINING PROGRAM

## 2024-04-24 PROCEDURE — 83615 LACTATE (LD) (LDH) ENZYME: CPT | Performed by: STUDENT IN AN ORGANIZED HEALTH CARE EDUCATION/TRAINING PROGRAM

## 2024-04-24 PROCEDURE — 84100 ASSAY OF PHOSPHORUS: CPT | Performed by: STUDENT IN AN ORGANIZED HEALTH CARE EDUCATION/TRAINING PROGRAM

## 2024-04-24 PROCEDURE — 27000248 HC VAD-ADDITIONAL DAY

## 2024-04-24 PROCEDURE — 82803 BLOOD GASES ANY COMBINATION: CPT

## 2024-04-24 PROCEDURE — 25000003 PHARM REV CODE 250: Performed by: STUDENT IN AN ORGANIZED HEALTH CARE EDUCATION/TRAINING PROGRAM

## 2024-04-24 PROCEDURE — 85730 THROMBOPLASTIN TIME PARTIAL: CPT | Performed by: STUDENT IN AN ORGANIZED HEALTH CARE EDUCATION/TRAINING PROGRAM

## 2024-04-24 PROCEDURE — 99900035 HC TECH TIME PER 15 MIN (STAT)

## 2024-04-24 PROCEDURE — 85025 COMPLETE CBC W/AUTO DIFF WBC: CPT | Performed by: STUDENT IN AN ORGANIZED HEALTH CARE EDUCATION/TRAINING PROGRAM

## 2024-04-24 PROCEDURE — 80048 BASIC METABOLIC PNL TOTAL CA: CPT | Performed by: STUDENT IN AN ORGANIZED HEALTH CARE EDUCATION/TRAINING PROGRAM

## 2024-04-24 PROCEDURE — 27000207 HC ISOLATION

## 2024-04-24 PROCEDURE — 99233 SBSQ HOSP IP/OBS HIGH 50: CPT | Mod: ,,, | Performed by: HOSPITALIST

## 2024-04-24 PROCEDURE — 83735 ASSAY OF MAGNESIUM: CPT | Performed by: STUDENT IN AN ORGANIZED HEALTH CARE EDUCATION/TRAINING PROGRAM

## 2024-04-24 PROCEDURE — 99233 SBSQ HOSP IP/OBS HIGH 50: CPT | Mod: ,,, | Performed by: INTERNAL MEDICINE

## 2024-04-24 PROCEDURE — 63600175 PHARM REV CODE 636 W HCPCS: Performed by: INTERNAL MEDICINE

## 2024-04-24 PROCEDURE — 20600001 HC STEP DOWN PRIVATE ROOM

## 2024-04-24 PROCEDURE — 80076 HEPATIC FUNCTION PANEL: CPT | Performed by: STUDENT IN AN ORGANIZED HEALTH CARE EDUCATION/TRAINING PROGRAM

## 2024-04-24 RX ORDER — POTASSIUM CHLORIDE 750 MG/1
10 CAPSULE, EXTENDED RELEASE ORAL ONCE
Status: COMPLETED | OUTPATIENT
Start: 2024-04-24 | End: 2024-04-24

## 2024-04-24 RX ORDER — SODIUM CHLORIDE 9 MG/ML
INJECTION, SOLUTION INTRAVENOUS ONCE
Status: CANCELLED | OUTPATIENT
Start: 2024-04-25 | End: 2024-04-25

## 2024-04-24 RX ADMIN — TRAZODONE HYDROCHLORIDE 25 MG: 50 TABLET ORAL at 08:04

## 2024-04-24 RX ADMIN — SODIUM BICARBONATE 325 MG: 325 TABLET ORAL at 05:04

## 2024-04-24 RX ADMIN — HYDRALAZINE HYDROCHLORIDE 75 MG: 50 TABLET ORAL at 10:04

## 2024-04-24 RX ADMIN — CEFTRIAXONE 2 G: 2 INJECTION, POWDER, FOR SOLUTION INTRAMUSCULAR; INTRAVENOUS at 10:04

## 2024-04-24 RX ADMIN — AMIODARONE HYDROCHLORIDE 400 MG: 200 TABLET ORAL at 08:04

## 2024-04-24 RX ADMIN — GABAPENTIN 100 MG: 100 CAPSULE ORAL at 08:04

## 2024-04-24 RX ADMIN — CEFTRIAXONE 2 G: 2 INJECTION, POWDER, FOR SOLUTION INTRAMUSCULAR; INTRAVENOUS at 09:04

## 2024-04-24 RX ADMIN — Medication 400 MG: at 08:04

## 2024-04-24 RX ADMIN — SODIUM BICARBONATE 325 MG: 325 TABLET ORAL at 08:04

## 2024-04-24 RX ADMIN — FAMOTIDINE 20 MG: 20 TABLET ORAL at 08:04

## 2024-04-24 RX ADMIN — AMPICILLIN 2 G: 2 INJECTION, POWDER, FOR SOLUTION INTRAMUSCULAR; INTRAVENOUS at 08:04

## 2024-04-24 RX ADMIN — HYDRALAZINE HYDROCHLORIDE 75 MG: 50 TABLET ORAL at 12:04

## 2024-04-24 RX ADMIN — HYDRALAZINE HYDROCHLORIDE 75 MG: 50 TABLET ORAL at 05:04

## 2024-04-24 RX ADMIN — POTASSIUM CHLORIDE 10 MEQ: 750 CAPSULE, EXTENDED RELEASE ORAL at 08:04

## 2024-04-24 RX ADMIN — MUPIROCIN: 20 OINTMENT TOPICAL at 08:04

## 2024-04-24 RX ADMIN — MUPIROCIN 1 TUBE: 20 OINTMENT TOPICAL at 08:04

## 2024-04-24 RX ADMIN — VENLAFAXINE 37.5 MG: 37.5 TABLET ORAL at 08:04

## 2024-04-24 NOTE — NURSING
Informed Dr. Segovia of patient's doppler-100/0. Per , ok to give hydralazine scheduled for 2200  now. Also mentioned that patient's wife said patient vomitted after eating his dinner, Dr. Segovia will order prn zofran. Patient is asymptomatic.

## 2024-04-24 NOTE — PLAN OF CARE
Problem: Adult Inpatient Plan of Care  Goal: Plan of Care Review  Outcome: Progressing  Goal: Optimal Comfort and Wellbeing  Outcome: Progressing     Problem: Diabetes Comorbidity  Goal: Blood Glucose Level Within Targeted Range  Outcome: Progressing     Problem: Bleeding (Sepsis/Septic Shock)  Goal: Absence of Bleeding  Outcome: Progressing     Problem: Fluid Imbalance (Pneumonia)  Goal: Fluid Balance  Outcome: Progressing     Problem: Infection  Goal: Absence of Infection Signs and Symptoms  Outcome: Progressing    Plan of care discussed with patient.  Patient ambulating with x1 assist , fall precautions in place. LVAD DP and numbers WNL, smooth LVAD hum. Patient has no complaints of pain. Discussed medications and care.  Patient has no questions at this time. Will continue to monitor.

## 2024-04-24 NOTE — PT/OT/SLP PROGRESS
"Occupational Therapy   Treatment    Name: Radha Abbott  MRN: [id number]  Admitting Diagnosis:  Pneumonia due to COVID-19 virus       Recommendations:     Discharge Recommendations: Low Intensity Therapy  Discharge Equipment Recommendations:  walker, rolling  Barriers to discharge:  None    Assessment:     Radha Abbott is a 64 y.o. male with a medical diagnosis of Pneumonia due to COVID-19 virus.  He presents with performance deficits affecting function are weakness, impaired endurance, impaired self care skills, impaired functional mobility, gait instability, decreased lower extremity function, impaired balance, impaired cardiopulmonary response to activity.     Pt engaged in therapy well this date, encountered sitting upright in chair with wife present, LVAD on battery. Pt able to complete STS transfer with SBA/RW, and functional mobility in hospital room with SBA/RW, 1 LOB with self recovery with CGA. Pt on pathway for post acute care at low level of intensity.     Rehab Prognosis:  Good; patient would benefit from acute skilled OT services to address these deficits and reach maximum level of function.       Plan:     Patient to be seen 4 x/week to address the above listed problems via self-care/home management, therapeutic activities, therapeutic exercises, neuromuscular re-education  Plan of Care Expires: 05/17/24  Plan of Care Reviewed with: patient, spouse    Subjective     Chief Complaint: "I'm ready to get out of here"   Patient/Family Comments/goals: Get better, return home   Pain/Comfort:  Pain Rating 1: 0/10  Pain Rating Post-Intervention 1: 0/10    Objective:     Communicated with: RN prior to session.  Patient found up in chair with LVAD, telemetry upon OT entry to room.    General Precautions: Standard, fall, LVAD, airborne, contact, droplet    Orthopedic Precautions:N/A  Braces: N/A  Respiratory Status: Room air     Occupational Performance:     Bed Mobility:    Not observed this date     Functional " Mobility/Transfers:  Patient completed Sit <> Stand Transfer with stand by assistance  with  rolling walker   Functional Mobility: Pt engaging in functional mobility to simulate household/community distances from upright chair to hospital room door x3  with SBA and utilizing RW in order to maximize functional activity tolerance and standing balance required for engagement in occupations of choice. 1 LOB balance noted but self recovered, SBA > CGA > SBA    Activities of Daily Living:  Politely declined this date, having already completed       [facility] 6 Click ADL: 24    Treatment & Education:  Pt educated on role of OT, POC, and goals for therapy.    POC was dicussed with patient/caregiver, who was included in its development and is in agreement with the identified goals and treatment plan.   Patient and family aware of patient's deficits and therapy progression.   Time provided for therapeutic counseling and discussion of health disposition.   Educated on importance of EOB/OOB mobility, maintaining routine, sitting up in chair, and maximizing independence with ADLs during admission   Pt completed ADLs and functional mobility for treatment session as noted above   Pt/caregiver verbalized understanding and expressed no further concerns/questions.  Updated communication board with level of assist required       Patient left up in chair with all lines intact, call button in reach, RN notified, and wife present    GOALS:   Multidisciplinary Problems       Occupational Therapy Goals          Problem: Occupational Therapy    Goal Priority Disciplines Outcome Interventions   Occupational Therapy Goal     OT, PT/OT Progressing    Description: Goals to be met by: 5/17/24     Patient will increase functional independence with ADLs by performing:    UE Dressing with Modified [place].  LE Dressing with Modified [place].  Grooming while standing at sink with Modified [place].  Toileting from toilet/bedside commode  with Modified [place] for hygiene and clothing management.   Toilet transfer to toilet/bedside commode with Modified [place].                         Time Tracking:     OT Date of Treatment: 04/24/24  OT Start Time: 1218  OT Stop Time: 1227  OT Total Time (min): 9 min    Billable Minutes:Therapeutic Activity 9    OT/PRIETO: OT          4/24/2024

## 2024-04-24 NOTE — PLAN OF CARE
Pt engaged well in therapy this date.     Problem: Occupational Therapy  Goal: Occupational Therapy Goal  Description: Goals to be met by: 5/17/24     Patient will increase functional independence with ADLs by performing:    UE Dressing with Modified [place].  LE Dressing with Modified [place].  Grooming while standing at sink with Modified [place].  Toileting from toilet/bedside commode with Modified [place] for hygiene and clothing management.   Toilet transfer to toilet/bedside commode with Modified [place].    Outcome: Progressing

## 2024-04-24 NOTE — PROGRESS NOTES
[facility] - [facility]  Heart Transplant  Progress Note    Patient Name: Radha Abbott  MRN: [id number]  Admission Date: 4/16/2024  Hospital Length of Stay: 8 days  Attending Physician: Brayan Ocampo MD  Primary Care Provider: Vasu Kong MD  Principal Problem:Pneumonia due to COVID-19 virus    Subjective:   Interval History:   NAEON. No acute complaints. Gave IVP 80mg lasix challenge yesterday and patient produced around 900cc of urine. CVP 5 this AM.     Continuous Infusions:  Current Facility-Administered Medications   Medication Dose Route Frequency Last Rate Last Admin     Scheduled Meds:  Current Facility-Administered Medications   Medication Dose Route Frequency    amiodarone  400 mg Oral Daily    ampicillin IV (PEDS and ADULTS)  2 g Intravenous Q12H    cefTRIAXone (Rocephin) IV (PEDS and ADULTS)  2 g Intravenous Q12H    famotidine  20 mg Oral Daily    gabapentin  100 mg Oral BID    hydrALAZINE  75 mg Oral Q8H    magnesium oxide  400 mg Oral Daily    mupirocin   Nasal BID    sodium bicarbonate  325 mg Oral TID    traZODone  25 mg Oral QHS    venlafaxine  37.5 mg Oral Daily     PRN Meds:  Current Facility-Administered Medications:     acetaminophen, 1,000 mg, Oral, Q8H PRN    dextrose 10%, 12.5 g, Intravenous, PRN    dextrose 10%, 25 g, Intravenous, PRN    glucagon (human recombinant), 1 mg, Intramuscular, PRN    glucose, 16 g, Oral, PRN    glucose, 24 g, Oral, PRN    insulin aspart U-100, 0-5 Units, Subcutaneous, QID (AC + HS) PRN    senna-docusate 8.6-50 mg, 2 tablet, Oral, Daily PRN    Review of patient's allergies indicates:  No Known Allergies  Objective:     Vital Signs (Most Recent):  Temp: 98.2 °F (36.8 °C) (04/24/24 0835)  Pulse: 88 (04/24/24 0835)  Resp: 16 (04/24/24 0835)  BP: 104/76 (04/24/24 0835)  SpO2: 100 % (04/24/24 0835) Vital Signs (24h Range):  Temp:  [97.4 °F (36.3 °C)-98.2 °F (36.8 °C)] 98.2 °F (36.8 °C)  Pulse:  [58-88] 88  Resp:  [16-20] 16  SpO2:  [80 %-100 %] 100  %  BP: ()/(0-85) 104/76     Patient Vitals for the past 72 hrs (Last 3 readings):   Weight   04/24/24 0509 77.4 kg (170 lb 10.2 oz)   04/23/24 0806 77.4 kg (170 lb 10.2 oz)   04/22/24 0522 77.5 kg (170 lb 13.7 oz)     Body mass index is 21.91 kg/m².      Intake/Output Summary (Last 24 hours) at 4/24/2024 1030  Last data filed at 4/24/2024 0948  Gross per 24 hour   Intake 1588 ml   Output 1201 ml   Net 387 ml       Hemodynamic Parameters:  CVP:  [5 mmHg] 5 mmHg    Telemetry: SR with PVC's       Physical Exam  Constitutional:       Appearance: He is ill-appearing.   HENT:      Head: Normocephalic and atraumatic.   Eyes:      Conjunctiva/sclera: Conjunctivae normal.      Pupils: Pupils are equal, round, and reactive to light.   Neck:      Comments: JVP not visible on exam  Cardiovascular:      Rate and Rhythm: Normal rate and regular rhythm.      Comments: Smooth VAD hum  Pulmonary:      Effort: Pulmonary effort is normal.      Comments: Breath sounds are decreased right base. Lungs essentially CTA bilaterally  Abdominal:      General: Bowel sounds are normal.      Palpations: Abdomen is soft.   Musculoskeletal:         General: No swelling. Normal range of motion.      Cervical back: Normal range of motion and neck supple.   Skin:     General: Skin is warm and dry.      Capillary Refill: Capillary refill takes 2 to 3 seconds.   Neurological:      General: No focal deficit present.      Mental Status: He is alert and oriented to person, place, and time.            Significant Labs:  CBC:  Recent Labs   Lab 04/22/24  0530 04/23/24  0422 04/24/24  0425   WBC 5.14 4.24 4.30   RBC 2.80* 2.78* 2.83*   HGB 8.1* 8.0* 7.9*   HCT 23.3* 23.3* 24.0*    292 319   MCV 83 84 85   MCH 28.9 28.8 27.9   MCHC 34.8 34.3 32.9     BNP:  Recent Labs   Lab 04/19/24  0530 04/22/24  0530 04/24/24  0425   * 1,116* 1,263*     CMP:  Recent Labs   Lab 04/19/24  0530 04/20/24  0234 04/22/24  0530 04/23/24  0422 04/23/24  1533  04/24/24 0425      < > 105 133*  --  95   CALCIUM 9.2   < > 8.5* 8.7  --  8.6*   ALBUMIN 3.0*  --  2.5*  --   --  2.5*   PROT 8.2  --  6.9  --   --  7.2   *   < > 129* 136  --  135*   K 3.6   < > 3.5 3.9 4.0 3.9   CO2 24   < > 21* 23  --  22*   CL 92*   < > 91* 102  --  101   BUN 98*   < > 114* 58*  --  57*   CREATININE 6.0*   < > 8.6* 5.4*  --  5.6*   ALKPHOS 139*  --  154*  --   --  201*   *  --  230*  --   --  186*   *  --  168*  --   --  140*   BILITOT 0.8  --  0.2  --   --  0.2    < > = values in this interval not displayed.      Coagulation:   Recent Labs   Lab 04/22/24 0530 04/23/24 0422 04/24/24 0425   INR 6.1* 6.9* 3.7*   APTT 68.6* 65.1* 60.0*     LDH:  Recent Labs   Lab 04/22/24 0530 04/23/24 0422 04/24/24 0425   * 404* 372*     Microbiology:  Microbiology Results (last 7 days)       Procedure Component Value Units Date/Time    Blood culture [[id number]] Collected: 04/20/24 0234    Order Status: Completed Specimen: Blood Updated: 04/24/24 0612     Blood Culture, Routine No Growth to date      No Growth to date      No Growth to date      No Growth to date      No Growth to date    Blood culture [[id number]] Collected: 04/20/24 0240    Order Status: Completed Specimen: Blood Updated: 04/24/24 0612     Blood Culture, Routine No Growth to date      No Growth to date      No Growth to date      No Growth to date      No Growth to date    Blood culture [[id number]] Collected: 04/18/24 0534    Order Status: Completed Specimen: Blood Updated: 04/23/24 1012     Blood Culture, Routine No growth after 5 days.    Blood culture [[id number]] Collected: 04/18/24 0535    Order Status: Completed Specimen: Blood Updated: 04/23/24 1012     Blood Culture, Routine No growth after 5 days.    Blood culture x two cultures. Draw prior to antibiotics. [[id number]]  (Abnormal) Collected: 04/16/24 8550    Order Status: Completed Specimen: Blood from Peripheral, Hand, Right Updated: 04/19/24  1501     Blood Culture, Routine Gram stain aer bottle: Gram positive cocci      Gram stain nata bottle: Gram positive cocci      Positive results previously called 04/17/2024 15:08      ENTEROCOCCUS FAECALIS  For susceptibility see order #[id number]      Narrative:      Aerobic and anaerobic    Blood culture x two cultures. Draw prior to antibiotics. [[id number]]  (Abnormal)  (Susceptibility) Collected: 04/16/24 2322    Order Status: Completed Specimen: Blood from Peripheral, Hand, Left Updated: 04/19/24 1500     Blood Culture, Routine Gram stain aer bottle: Gram positive cocci      Gram stain nata bottle: Gram positive cocci      Results called to and read back by: Rachael Horan RN 04/17/2024  15:08      ENTEROCOCCUS FAECALIS    Narrative:      Aerobic and anaerobic    Rapid Organism ID by PCR (from Blood culture) [[id number]]  (Abnormal) Collected: 04/16/24 2322    Order Status: Completed Updated: 04/17/24 1726     Enterococcus faecalis Detected     Enterococcus faecium Not Detected     Listeria monocytogenes Not Detected     Staphylococcus spp. Not Detected     Staphylococcus aureus Not Detected     Staphylococcus epidermidis Not Detected     Staphylococcus lugdunensis Not Detected     Streptococcus species Not Detected     Streptococcus agalactiae Not Detected     Streptococcus pneumoniae Not Detected     Streptococcus pyogenes Not Detected     Acinetobacter calcoaceticus/baumannii complex Not Detected     Bacteroides fragilis Not Detected     Enterobacterales Not Detected     Enterobacter cloacae complex Not Detected     Escherichia coli Not Detected     Klebsiella aerogenes Not Detected     Klebsiella oxytoca Not Detected     Klebsiella pneumoniae group Not Detected     Proteus Not Detected     Salmonella sp Not Detected     Serratia marcescens Not Detected     Haemophilus influenzae Not Detected     Neisseria meningtidis Not Detected     Pseudomonas aeruginosa Not Detected     Stenotrophomonas maltophilia Not  Detected     Candida albicans Not Detected     Candida auris Not Detected     Candida glabrata Not Detected     Candida krusei Not Detected     Candida parapsilosis Not Detected     Candida tropicalis Not Detected     Cryptococcus neoformans/gattii Not Detected     CTX-M (ESBL ) Test Not Applicable     IMP (Carbapenem resistant) Test Not Applicable     KPC resistance gene (Carbapenem resistant) Test Not Applicable     mcr-1  Test Not Applicable     mec A/C  Test Not Applicable     mec A/C and MREJ (MRSA) gene Test Not Applicable     NDM (Carbapenem resistant) Test Not Applicable     OXA-48-like (Carbapenem resistant) Test Not Applicable     van A/B (VRE gene) Not Detected     VIM (Carbapenem resistant) Test Not Applicable    Narrative:      Aerobic and anaerobic            I have reviewed all pertinent labs within the past 24 hours.    Estimated Creatinine Clearance: 15 mL/min (A) (based on SCr of 5.6 mg/dL (H)).    Diagnostic Results:  I have reviewed and interpreted all pertinent imaging results/findings within the past 24 hours.  Assessment and Plan:     Mr. Radha Abbott is a very pleasant 65 yo male with stage D HFrEF, ICMP with previous admission for ADHF/CS. He did undergo HM3 placement placed by Dr Washington on 12/1/23. Lengthy post op course complicated by vasoplegia(requiring Giaprezza), debility, malnutrition/impaired swallowing, and renal failure requiring HD(since weaned off). Once medically stable, he was transferred to Rehab for further PT/OT/ST. Of note, pt left with permacath still in place. He has done really well in rehab and now has continued to progress well at home. He is on a very high dose of Bumex 4 mg twice daily. VAD speed is at 5000 rpm. No VAD alarms noted on interrogation occasional PI events . Today he presents to the ED for infectious workup due to complaints of fevers of 101.5F associated with fatigue, generalized weakness, lower back pain and HA. He also reports a decline in  appetite. Wife changes his driveline dressing every Saturday and has not noticed any purulance with dressing changes. Body aches/weakness started around 2am last night followed by fevers around 5pm. He has been taking tylenol which has helped brng down fevers but has not helped with HA. HA is 5/10 in intensity. He reports slight increase in body weight/ fluid retention yesterday for which he took a dose of Metolazone with a drop in weight by ~5lbs. Denies any SOB, LE swelling at this time.      2D Echo with CFD done on 3/26/2024  LVAD: There is a Heartmate III LVAD running at 5000 RPM. The aortic valve does not open. The ventricular septum is at midline. Inflow cannula well seated at the apex. Outflow graft not visualized.    Left Ventricle: The left ventricle is moderately dilated. Normal wall thickness. Global hypokinesis present. Septal motion is abnormal. There is severely reduced systolic function with a visually estimated ejection fraction of 5 - 10%. There is diastolic dysfunction but grade cannot be determined.    Right Ventricle: Mild right ventricular enlargement. Wall thickness is normal. Right ventricle wall motion  is normal. Systolic function is normal. Pacemaker lead present in the ventricle.    Left Atrium: Left atrium is severely dilated.    Right Atrium: Right atrium is moderately dilated.    Mitral Valve: The mitral valve is repaired with an Noble stitch. There is mild regurgitation.    IVC/SVC: Normal venous pressure at 3 mmHg.    * Pneumonia due to COVID-19 virus  -4/16/24 he presented to the ED for infectious workup due to complaints of fevers of 101.5F associated with fatigue, generalized weakness, lower back pain and HA that started this morning. No evidence of drive line infection. No history of line infection.   - COVID +ve  - CT chest/ abdomen / pelvis 4/16 showed new RML opacity  - S/p remdesavir.   - continue tylenol for fevers    IVÁN (acute kidney injury)  Most likely 2/2 to ATN on  CKD IV. Worsening. Expect creatinine to peak and come down.     - HD as per nephrology.   - patient starting to produce urine. Creatinine stable.   - Will continue to monitor.  - Nephrology consulted. Appreciate recs.         Bacteremia  -blood cxs from 4/16 +ve for E faecalis   -ID following  -E faecalis is a known cause of endocarditis and AICD lead infections.   -TTE was negative for vegetation.  However TTE does not rule out valvular or lead vegetation. Will treat this as endocarditis with 6 weeks of IV antibiotics followed by suppressive therapy   -Currently on Ampicillin and Ceftriaxone for 6wk course as per ID.   -Repeat cultures from 4/20 are negative thus far    Hyponatremia  -POA  -Daily labs       Hypertension  Continue hydralazine    Headache  C/o of HA on admit with no improvement despite tylenol  - CTH done 4/16 with no acute processes    LVAD (left ventricular assist device) present  -HeartMate 3 Implanted  12/1/2023  as DT  -Cont Coumadin, dosing by PharmD.  Goal INR 2.0-3.0 . Supratherapeutic today but improving after PO vit K 2.5mg yesterday.   -Antiplatelets Not on ASA due to low H/H on implant   -LDH is stable overall today. Will continue to monitor daily.  -Speed set at  5000     -Interrogation notable for no events  -ECHO 4/18/24 with speed at 5000 EF: 10-15%, LVEDD: 6.1cm, RV size normal function mod reduced   -RHC 1/22/24 with RA 13, PCWP 22, PA 50/25, CO 5.8, CI 3.0   -Not listed for OHTx, declined for OHTX due to comorbidities     Procedure: Device Interrogation Including analysis of device parameters  Current Settings: Ventricular Assist Device  Review of device function is stable/unstable stable        4/24/2024     8:23 AM 4/24/2024     3:53 AM 4/24/2024    12:03 AM 4/23/2024     8:02 PM 4/23/2024     3:40 PM 4/23/2024    11:31 AM 4/23/2024     7:40 AM   TXP LVAD INTERROGATIONS   Type HeartMate3 HeartMate3 HeartMate3 HeartMate3 HeartMate3 HeartMate3 HeartMate3   Flow 4.1 3.6 3.8 3.5 4  3.9 3.7   Speed 5000 5100 5000 5000 5000 5000 5000   PI 4.6 6.3 6.6 7.9 4.9 4.5 6.2   Power ([place]) 3.4 3.4 3.4 3.5 3.5 3.4 3.5   LSL 4800 4800 4600 4600 4600 4600 4600   Pulsatility Intermittent pulse  Intermittent pulse No Pulse Intermittent pulse Intermittent pulse Intermittent pulse         Type 2 diabetes mellitus without complication, without long-term current use of insulin  -SSI    Chronic combined systolic and diastolic heart failure  -ICM  -Last 2D Echo  4/18/24 : LVEF 10-15%, LVEDD  6.1 cm  -Euvolemic on examination today  -Was on Bumex 4mg BID at home.  Holding due to IVÁN   -GDMT with Hydralazine  -2g Na dietary restriction, 1500 mL fluid restriction, strict I/Os      Ventricular tachycardia  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        Angela Shen MD  Heart Transplant  [facility] - [facility]

## 2024-04-24 NOTE — PROGRESS NOTES
[facility] - [facility] Step[facility]  Nephrology  Progress Note    Patient Name: Radha Abbott  MRN: [id number]  Admission Date: 4/16/2024  Hospital Length of Stay: 8 days  Attending Provider: Brayan Ocampo MD   Primary Care Physician: Vasu Kong MD  Principal Problem:Pneumonia due to COVID-19 virus    Subjective:     HPI:  63 yo male with stage D HFrEF, ICMP with previous admission for ADHF/CS. S/p LVAD placement 12/2023 Lengthy post op course complicated by vasoplegia(requiring Giaprezza), debility, malnutrition/impaired swallowing, and renal failure requiring HD(since weaned off). presents to the ED for infectious workup due to complaints of fevers of 101.5F. Patient receiving abx, for presumed endocarditis/bactermia. Hospital course complicated by covid positive and oliguric IVÁN.     Concerning renal hx, patient following with [facility] nephrology Dr. Mendez in nephrology clinic. Baseline Scr ~4 with CKD IV/V, poor reserve.     Nephrology consulted for IVÁN    Interval History:   Planing for HD tomorrow, would recommend Bumax 4mg BID daily, UOP 950ml.  High risk of progressing to ESRD.    Review of patient's allergies indicates:  No Known Allergies  Current Facility-Administered Medications   Medication Dose Route Frequency Provider Last Rate Last Admin    acetaminophen tablet 1,000 mg  1,000 mg Oral Q8H PRN Lucy Brand MD   1,000 mg at 04/23/24 0405    amiodarone tablet 400 mg  400 mg Oral Daily Lucy Brand MD   400 mg at 04/24/24 0839    ampicillin (OMNIPEN) 2 g in sodium chloride 0.9 % 100 mL IVPB (MB+)  2 g Intravenous Q12H Axel Caban MD   Stopped at 04/24/24 0948    cefTRIAXone (ROCEPHIN) 2 g in dextrose 5 % in water (D5W) 100 mL IVPB (MB+)  2 g Intravenous Q12H Axel Caban MD   Stopped at 04/24/24 1016    dextrose 10% bolus 125 mL 125 mL  12.5 g Intravenous PRN Erasmo Parrish DNP, FNP        dextrose 10% bolus 250 mL 250 mL  25 g Intravenous PRN Erasmo Parrish DNP, FNP         famotidine tablet 20 mg  20 mg Oral Daily Lucy Brand MD   20 mg at 04/24/24 0839    gabapentin capsule 100 mg  100 mg Oral BID Javed Vivas MD   100 mg at 04/24/24 0839    glucagon (human recombinant) injection 1 mg  1 mg Intramuscular PRN Erasmo Parrish DNP, FNP        glucose chewable tablet 16 g  16 g Oral PRErasmo George DNP, FNP        glucose chewable tablet 24 g  24 g Oral PRN Erasmo Parrish DNP, FNP        hydrALAZINE tablet 75 mg  75 mg Oral Q8H Lucy Brand MD   75 mg at 04/24/24 1223    insulin aspart U-100 pen 0-5 Units  0-5 Units Subcutaneous QID (AC + HS) PRErasmo George DNP, FNP        magnesium oxide tablet 400 mg  400 mg Oral Daily Lucy Brand MD   400 mg at 04/24/24 0839    mupirocin 2 % ointment   Nasal BID Brayan Ocampo MD   Given at 04/24/24 0839    senna-docusate 8.6-50 mg per tablet 2 tablet  2 tablet Oral Daily PRN Lucy Brand MD        sodium bicarbonate tablet 325 mg  325 mg Oral TID Lucy Brand MD   325 mg at 04/24/24 1722    trazodone split tablet 25 mg  25 mg Oral QHS Lucy Brand MD   25 mg at 04/23/24 2023    venlafaxine tablet 37.5 mg  37.5 mg Oral Daily Lucy Brand MD   37.5 mg at 04/24/24 0839       Objective:     Vital Signs (Most Recent):  Temp: 97.5 °F (36.4 °C) (04/24/24 1550)  Pulse: 82 (04/24/24 1550)  Resp: 17 (04/24/24 1550)  BP: (!) 78/0 (04/24/24 1700)  SpO2: 98 % (04/24/24 1550) Vital Signs (24h Range):  Temp:  [97.4 °F (36.3 °C)-98.2 °F (36.8 °C)] 97.5 °F (36.4 °C)  Pulse:  [77-88] 82  Resp:  [16-20] 17  SpO2:  [98 %-100 %] 98 %  BP: ()/(0-85) 78/0     Weight: 77.4 kg (170 lb 10.2 oz) (04/24/24 0509)  Body mass index is 21.91 kg/m².  Body surface area is 2.01 meters squared.    I/O last 3 completed shifts:  In: 2368.6 [P.O.:1376; I.V.:200; IV Piggyback:792.6]  Out: 976 [Urine:975; Stool:1]     Physical Exam  Pulmonary:      Effort: Pulmonary effort is normal. No  respiratory distress.   Musculoskeletal:      Right lower leg: No edema.      Left lower leg: No edema.   Neurological:      General: No focal deficit present.      Mental Status: He is alert and oriented to person, place, and time.          Significant Labs:  BMP:   Recent Labs   Lab 04/24/24  0425   GLU 95   *   K 3.9      CO2 22*   BUN 57*   CREATININE 5.6*   CALCIUM 8.6*   MG 2.1     CBC:   Recent Labs   Lab 04/24/24  0425   WBC 4.30   RBC 2.83*   HGB 7.9*   HCT 24.0*      MCV 85   MCH 27.9   MCHC 32.9        Significant Imaging:  Labs: Reviewed  Assessment/Plan:     Renal/  IVÁN (acute kidney injury)  Anuric IVÁN on baseline CKD IV  Baseline Scr ~4; Scr at time of consult ~8.3  UA Trace protein; bland otherwise.   UPCR 0.61g  Renal US: not avaialbe. In 2023 showed MRD.  Suspect ATN given rapid increase in Scr and anuria. Patient with prior CKD V poor reserve. Chart reviewed in depth, no inciting cause of IVÁN (normal vanc troughs, no obvious nephrotoxins, normal CPK). Suspect insult occurred in setting of COVID/bacteremia.     Plan;  - HD tomorrow, high risk of progressing to ESRD.  -Recommending Bumax 4mg BID  - renally dose all medications  - avoid nephrotoxins as much as possible.     ID  Bacteremia  COVID positive  E. Fecalis bacteremia  Per primary team.         Thank you for your consult. I will follow-up with patient. Please contact us if you have any additional questions.    Kamilla Angela MD  Nephrology  [facility] - [facility]

## 2024-04-24 NOTE — SUBJECTIVE & OBJECTIVE
Interval History:   Planing for HD tomorrow, would recommend Bumax 4mg BID daily, UOP 950ml.  High risk of progressing to ESRD.    Review of patient's allergies indicates:  No Known Allergies  Current Facility-Administered Medications   Medication Dose Route Frequency Provider Last Rate Last Admin    acetaminophen tablet 1,000 mg  1,000 mg Oral Q8H PRN Lucy Brand MD   1,000 mg at 04/23/24 0405    amiodarone tablet 400 mg  400 mg Oral Daily Lucy Brand MD   400 mg at 04/24/24 0839    ampicillin (OMNIPEN) 2 g in sodium chloride 0.9 % 100 mL IVPB (MB+)  2 g Intravenous Q12H Axel Caban MD   Stopped at 04/24/24 0948    cefTRIAXone (ROCEPHIN) 2 g in dextrose 5 % in water (D5W) 100 mL IVPB (MB+)  2 g Intravenous Q12H Axel Caban MD   Stopped at 04/24/24 1016    dextrose 10% bolus 125 mL 125 mL  12.5 g Intravenous PRN Erasmo Parrish DNP, COREEN        dextrose 10% bolus 250 mL 250 mL  25 g Intravenous PRN Erasmo Parrish DNP, GAVINP        famotidine tablet 20 mg  20 mg Oral Daily Lucy Brand MD   20 mg at 04/24/24 0839    gabapentin capsule 100 mg  100 mg Oral BID Javed Vivas MD   100 mg at 04/24/24 0839    glucagon (human recombinant) injection 1 mg  1 mg Intramuscular PRN Erasmo Parrish DNP, COREEN        glucose chewable tablet 16 g  16 g Oral PRN Erasmo Parrish DNP, COREEN        glucose chewable tablet 24 g  24 g Oral PRN Erasmo Parrish DNP, GAVINP        hydrALAZINE tablet 75 mg  75 mg Oral Q8H Lucy Brand MD   75 mg at 04/24/24 1223    insulin aspart U-100 pen 0-5 Units  0-5 Units Subcutaneous QID (AC + HS) PRN Erasmo Parrish DNP, FNP        magnesium oxide tablet 400 mg  400 mg Oral Daily Lucy Brand MD   400 mg at 04/24/24 0839    mupirocin 2 % ointment   Nasal BID Krim, Selim R., MD   Given at 04/24/24 0839    senna-docusate 8.6-50 mg per tablet 2 tablet  2 tablet Oral Daily PRN Lucy Brand MD        sodium bicarbonate  tablet 325 mg  325 mg Oral TID Lucy Brand MD   325 mg at 04/24/24 1722    trazodone split tablet 25 mg  25 mg Oral QHS Lucy Brand MD   25 mg at 04/23/24 2023    venlafaxine tablet 37.5 mg  37.5 mg Oral Daily Lucy Brand MD   37.5 mg at 04/24/24 0839       Objective:     Vital Signs (Most Recent):  Temp: 97.5 °F (36.4 °C) (04/24/24 1550)  Pulse: 82 (04/24/24 1550)  Resp: 17 (04/24/24 1550)  BP: (!) 78/0 (04/24/24 1700)  SpO2: 98 % (04/24/24 1550) Vital Signs (24h Range):  Temp:  [97.4 °F (36.3 °C)-98.2 °F (36.8 °C)] 97.5 °F (36.4 °C)  Pulse:  [77-88] 82  Resp:  [16-20] 17  SpO2:  [98 %-100 %] 98 %  BP: ()/(0-85) 78/0     Weight: 77.4 kg (170 lb 10.2 oz) (04/24/24 0509)  Body mass index is 21.91 kg/m².  Body surface area is 2.01 meters squared.    I/O last 3 completed shifts:  In: 2368.6 [P.O.:1376; I.V.:200; IV Piggyback:792.6]  Out: 976 [Urine:975; Stool:1]     Physical Exam  Pulmonary:      Effort: Pulmonary effort is normal. No respiratory distress.   Musculoskeletal:      Right lower leg: No edema.      Left lower leg: No edema.   Neurological:      General: No focal deficit present.      Mental Status: He is alert and oriented to person, place, and time.          Significant Labs:  BMP:   Recent Labs   Lab 04/24/24  0425   GLU 95   *   K 3.9      CO2 22*   BUN 57*   CREATININE 5.6*   CALCIUM 8.6*   MG 2.1     CBC:   Recent Labs   Lab 04/24/24  0425   WBC 4.30   RBC 2.83*   HGB 7.9*   HCT 24.0*      MCV 85   MCH 27.9   MCHC 32.9        Significant Imaging:  Labs: Reviewed

## 2024-04-24 NOTE — PLAN OF CARE
Problem: Adult Inpatient Plan of Care  Goal: Plan of Care Review  Outcome: Progressing  Goal: Patient-Specific Goal (Individualized)  Outcome: Progressing  Goal: Absence of Hospital-Acquired Illness or Injury  Outcome: Progressing  Goal: Optimal Comfort and Wellbeing  Outcome: Progressing  Goal: Readiness for Transition of Care  Outcome: Progressing     Problem: Diabetes Comorbidity  Goal: Blood Glucose Level Within Targeted Range  Outcome: Progressing     Problem: Respiratory Compromise (Pneumonia)  Goal: Effective Oxygenation and Ventilation  Outcome: Progressing     Problem: Infection  Goal: Absence of Infection Signs and Symptoms  Outcome: Progressing

## 2024-04-24 NOTE — PROGRESS NOTES
04/24/24 1147 04/24/24 1206   Vital Signs   /70 (!) 92/0   MAP (mmHg) 90  --    BP Location Left arm Left arm   BP Method  --  Doppler     Dr. Shen notified. Ok to give 1400 hydralazine now at 1215.

## 2024-04-24 NOTE — PROGRESS NOTES
04/24/2024  Deni Frye    Current provider:  Brayan Ocampo MD    Device interrogation:      4/24/2024     8:23 AM 4/24/2024     3:53 AM 4/24/2024    12:03 AM 4/23/2024     8:02 PM 4/23/2024     3:40 PM 4/23/2024    11:31 AM 4/23/2024     7:40 AM   TXP LVAD INTERROGATIONS   Type HeartMate3 HeartMate3 HeartMate3 HeartMate3 HeartMate3 HeartMate3 HeartMate3   Flow 4.1 3.6 3.8 3.5 4 3.9 3.7   Speed 5000 5100 5000 5000 5000 5000 5000   PI 4.6 6.3 6.6 7.9 4.9 4.5 6.2   Power ([place]) 3.4 3.4 3.4 3.5 3.5 3.4 3.5   LSL 4800 4800 4600 4600 4600 4600 4600   Pulsatility Intermittent pulse  Intermittent pulse No Pulse Intermittent pulse Intermittent pulse Intermittent pulse          Rounded on [facility] [employer] to ensure all mechanical assist device settings (IABP or VAD) were appropriate and all parameters were within limits.  I was able to ensure all back up equipment was present, the staff had no issues, and the Perfusion Department daily rounding was complete.      For implantable VADs: Interrogation of Ventricular assist device was performed with analysis of device parameters and review of device function. I have personally reviewed the interrogation findings and agree with findings as stated.     In emergency, the nursing units have been notified to contact the perfusion department either by:  Calling [id number] from 630am to 4pm Mon thru Fri, utilizing the On-Call Finder functionality of Epic and searching for Perfusion, or by contacting the hospital  from 4pm to 630am and on weekends and asking to speak with the perfusionist on call.    9:44 AM

## 2024-04-24 NOTE — PROGRESS NOTES
"[facility] - Cardiology Stepdown  Adult Nutrition  Progress Note    SUMMARY       Recommendations    1.) Recommend continuing with Renal/Coumadin diet, fluid per MD.                  - consider liberalizing diet as renal labs improve, with goal of Cardiac/Coumadin diet.      2.) If PO intake <50%, recommend Boost Plus BID to help meet increased needs.      3.) RD to monitor.     Goals: to meet % of EEN/EPN by next RD f/u  Nutrition Goal Status: goal met  Communication of RD Recs:  (POC)    Assessment and Plan    Nutrition Problem  Increased PRO/Kcal needs     Related to (etiology):   Increased physiological needs     Signs and Symptoms (as evidenced by):   LVAD present/pneumonia      Interventions/Recommendations (treatment strategy):  Collaboration of nutritional care with other providers.      Nutrition Diagnosis Status:   Continues     Reason for Assessment    Reason For Assessment: RD follow-up  Diagnosis: infection/sepsis  Relevant Medical History: CAD, CHF s/p LVAD, DM2  Interdisciplinary Rounds: did not attend    General Information Comments:   RD f/u: COVID +. IVÁN continue. Pt tolerating ~75% of meals per chart. -175 lb. Wt stable since admit. Pt appears nourished. No indicator of malnutrition.     Nutrition Discharge Planning: adequate nutritional intake    Nutrition Risk Screen    Nutrition Risk Screen: no indicators present    Nutrition/Diet History    Patient Reported Diet/Restrictions/Preferences: low salt  Typical Food/Fluid Intake: 2 meals + snacks  Spiritual, Cultural Beliefs, Sikhism Practices, Values that Affect Care: no    Anthropometrics    Temp: 97.4 °F (36.3 °C)  Height: 6' 2" (188 cm)  Height (inches): 74 in  Weight Method: Standard Scale  Weight: 77.4 kg (170 lb 10.2 oz)  Weight (lb): 170.64 lb  Ideal Body Weight (IBW), Male: 190 lb  % Ideal Body Weight, Male (lb): 87.03 %  BMI (Calculated): 21.9       Lab/Procedures/Meds    Pertinent Labs Reviewed: reviewed  Pertinent Labs " Comments: albumin 2.5, , ASLT 186, , Na 135, Cr 5.6, eGFR 10.8  Pertinent Medications Reviewed: reviewed  Pertinent Medications Comments:  Current Facility-Administered Medications   Medication Dose Route Frequency    amiodarone  400 mg Oral Daily    ampicillin IV (PEDS and ADULTS)  2 g Intravenous Q12H    cefTRIAXone (Rocephin) IV (PEDS and ADULTS)  2 g Intravenous Q12H    famotidine  20 mg Oral Daily    gabapentin  100 mg Oral BID    hydrALAZINE  75 mg Oral Q8H    magnesium oxide  400 mg Oral Daily    mupirocin   Nasal BID    sodium bicarbonate  325 mg Oral TID    traZODone  25 mg Oral QHS    venlafaxine  37.5 mg Oral Daily     Estimated/Assessed Needs    Weight Used For Calorie Calculations: 78 kg (171 lb 15.3 oz)  Energy Calorie Requirements (kcal): 1950- 2340 kcal  Energy Need Method: Kcal/kg (25-30 kcal/kg)  Protein Requirements: 94- 109 g (1.2-1.4g/kg)  Weight Used For Protein Calculations: 78 kg (171 lb 15.3 oz)  Fluid Requirements (mL): 1ml/1kcal or per MD  Estimated Fluid Requirement Method: RDA Method  RDA Method (mL): 1950  CHO Requirement: 244- 293 g    Nutrition Prescription Ordered    Current Diet Order: Renal/Coumadin (1.5L FR)    Evaluation of Received Nutrient/Fluid Intake    I/O: + 1.8 L since admit  Energy Calories Required: meeting needs  Protein Required: meeting needs  Fluid Required:  (as per MD)  Comments: LBM 4/24  Tolerance: tolerating  % Intake of Estimated Energy Needs: 75 - 100 %  % Meal Intake: 75 - 100 %    Nutrition Risk    Level of Risk/Frequency of Follow-up:  (RD to f/u x 1-2/week)     Monitor and Evaluation    Food and Nutrient Intake: energy intake, food and beverage intake  Food and Nutrient Adminstration: diet order  Knowledge/Beliefs/Attitudes: food and nutrition knowledge/skill  Physical Activity and Function: nutrition-related ADLs and IADLs  Anthropometric Measurements: weight, weight change, body mass index  Biochemical Data, Medical Tests and Procedures:  electrolyte and renal panel, gastrointestinal profile, glucose/endocrine profile, inflammatory profile, lipid profile  Nutrition-Focused Physical Findings: overall appearance, skin     Nutrition Follow-Up    RD Follow-up?: Yes

## 2024-04-24 NOTE — SUBJECTIVE & OBJECTIVE
Interval History:   NAEON. No acute complaints. Gave IVP 80mg lasix challenge yesterday and patient produced around 900cc of urine. CVP 5 this AM.     Continuous Infusions:  Current Facility-Administered Medications   Medication Dose Route Frequency Last Rate Last Admin     Scheduled Meds:  Current Facility-Administered Medications   Medication Dose Route Frequency    amiodarone  400 mg Oral Daily    ampicillin IV (PEDS and ADULTS)  2 g Intravenous Q12H    cefTRIAXone (Rocephin) IV (PEDS and ADULTS)  2 g Intravenous Q12H    famotidine  20 mg Oral Daily    gabapentin  100 mg Oral BID    hydrALAZINE  75 mg Oral Q8H    magnesium oxide  400 mg Oral Daily    mupirocin   Nasal BID    sodium bicarbonate  325 mg Oral TID    traZODone  25 mg Oral QHS    venlafaxine  37.5 mg Oral Daily     PRN Meds:  Current Facility-Administered Medications:     acetaminophen, 1,000 mg, Oral, Q8H PRN    dextrose 10%, 12.5 g, Intravenous, PRN    dextrose 10%, 25 g, Intravenous, PRN    glucagon (human recombinant), 1 mg, Intramuscular, PRN    glucose, 16 g, Oral, PRN    glucose, 24 g, Oral, PRN    insulin aspart U-100, 0-5 Units, Subcutaneous, QID (AC + HS) PRN    senna-docusate 8.6-50 mg, 2 tablet, Oral, Daily PRN    Review of patient's allergies indicates:  No Known Allergies  Objective:     Vital Signs (Most Recent):  Temp: 98.2 °F (36.8 °C) (04/24/24 0835)  Pulse: 88 (04/24/24 0835)  Resp: 16 (04/24/24 0835)  BP: 104/76 (04/24/24 0835)  SpO2: 100 % (04/24/24 0835) Vital Signs (24h Range):  Temp:  [97.4 °F (36.3 °C)-98.2 °F (36.8 °C)] 98.2 °F (36.8 °C)  Pulse:  [58-88] 88  Resp:  [16-20] 16  SpO2:  [80 %-100 %] 100 %  BP: ()/(0-85) 104/76     Patient Vitals for the past 72 hrs (Last 3 readings):   Weight   04/24/24 0509 77.4 kg (170 lb 10.2 oz)   04/23/24 0806 77.4 kg (170 lb 10.2 oz)   04/22/24 0522 77.5 kg (170 lb 13.7 oz)     Body mass index is 21.91 kg/m².      Intake/Output Summary (Last 24 hours) at 4/24/2024 1030  Last data  filed at 4/24/2024 0948  Gross per 24 hour   Intake 1588 ml   Output 1201 ml   Net 387 ml       Hemodynamic Parameters:  CVP:  [5 mmHg] 5 mmHg    Telemetry: SR with PVC's       Physical Exam  Constitutional:       Appearance: He is ill-appearing.   HENT:      Head: Normocephalic and atraumatic.   Eyes:      Conjunctiva/sclera: Conjunctivae normal.      Pupils: Pupils are equal, round, and reactive to light.   Neck:      Comments: JVP not visible on exam  Cardiovascular:      Rate and Rhythm: Normal rate and regular rhythm.      Comments: Smooth VAD hum  Pulmonary:      Effort: Pulmonary effort is normal.      Comments: Breath sounds are decreased right base. Lungs essentially CTA bilaterally  Abdominal:      General: Bowel sounds are normal.      Palpations: Abdomen is soft.   Musculoskeletal:         General: No swelling. Normal range of motion.      Cervical back: Normal range of motion and neck supple.   Skin:     General: Skin is warm and dry.      Capillary Refill: Capillary refill takes 2 to 3 seconds.   Neurological:      General: No focal deficit present.      Mental Status: He is alert and oriented to person, place, and time.            Significant Labs:  CBC:  Recent Labs   Lab 04/22/24  0530 04/23/24  0422 04/24/24 0425   WBC 5.14 4.24 4.30   RBC 2.80* 2.78* 2.83*   HGB 8.1* 8.0* 7.9*   HCT 23.3* 23.3* 24.0*    292 319   MCV 83 84 85   MCH 28.9 28.8 27.9   MCHC 34.8 34.3 32.9     BNP:  Recent Labs   Lab 04/19/24  0530 04/22/24  0530 04/24/24  0425   * 1,116* 1,263*     CMP:  Recent Labs   Lab 04/19/24  0530 04/20/24  0234 04/22/24  0530 04/23/24  0422 04/23/24  1533 04/24/24  0425      < > 105 133*  --  95   CALCIUM 9.2   < > 8.5* 8.7  --  8.6*   ALBUMIN 3.0*  --  2.5*  --   --  2.5*   PROT 8.2  --  6.9  --   --  7.2   *   < > 129* 136  --  135*   K 3.6   < > 3.5 3.9 4.0 3.9   CO2 24   < > 21* 23  --  22*   CL 92*   < > 91* 102  --  101   BUN 98*   < > 114* 58*  --  57*    CREATININE 6.0*   < > 8.6* 5.4*  --  5.6*   ALKPHOS 139*  --  154*  --   --  201*   *  --  230*  --   --  186*   *  --  168*  --   --  140*   BILITOT 0.8  --  0.2  --   --  0.2    < > = values in this interval not displayed.      Coagulation:   Recent Labs   Lab 04/22/24 0530 04/23/24 0422 04/24/24 0425   INR 6.1* 6.9* 3.7*   APTT 68.6* 65.1* 60.0*     LDH:  Recent Labs   Lab 04/22/24 0530 04/23/24 0422 04/24/24 0425   * 404* 372*     Microbiology:  Microbiology Results (last 7 days)       Procedure Component Value Units Date/Time    Blood culture [[id number]] Collected: 04/20/24 0234    Order Status: Completed Specimen: Blood Updated: 04/24/24 0612     Blood Culture, Routine No Growth to date      No Growth to date      No Growth to date      No Growth to date      No Growth to date    Blood culture [[id number]] Collected: 04/20/24 0240    Order Status: Completed Specimen: Blood Updated: 04/24/24 0612     Blood Culture, Routine No Growth to date      No Growth to date      No Growth to date      No Growth to date      No Growth to date    Blood culture [[id number]] Collected: 04/18/24 0534    Order Status: Completed Specimen: Blood Updated: 04/23/24 1012     Blood Culture, Routine No growth after 5 days.    Blood culture [[id number]] Collected: 04/18/24 0535    Order Status: Completed Specimen: Blood Updated: 04/23/24 1012     Blood Culture, Routine No growth after 5 days.    Blood culture x two cultures. Draw prior to antibiotics. [[id number]]  (Abnormal) Collected: 04/16/24 2322    Order Status: Completed Specimen: Blood from Peripheral, Hand, Right Updated: 04/19/24 1501     Blood Culture, Routine Gram stain aer bottle: Gram positive cocci      Gram stain nata bottle: Gram positive cocci      Positive results previously called 04/17/2024 15:08      ENTEROCOCCUS FAECALIS  For susceptibility see order #[id number]      Narrative:      Aerobic and anaerobic    Blood culture x two  cultures. Draw prior to antibiotics. [[id number]]  (Abnormal)  (Susceptibility) Collected: 04/16/24 2322    Order Status: Completed Specimen: Blood from Peripheral, Hand, Left Updated: 04/19/24 1500     Blood Culture, Routine Gram stain aer bottle: Gram positive cocci      Gram stain nata bottle: Gram positive cocci      Results called to and read back by: Rachael Horan RN 04/17/2024  15:08      ENTEROCOCCUS FAECALIS    Narrative:      Aerobic and anaerobic    Rapid Organism ID by PCR (from Blood culture) [[id number]]  (Abnormal) Collected: 04/16/24 2322    Order Status: Completed Updated: 04/17/24 1726     Enterococcus faecalis Detected     Enterococcus faecium Not Detected     Listeria monocytogenes Not Detected     Staphylococcus spp. Not Detected     Staphylococcus aureus Not Detected     Staphylococcus epidermidis Not Detected     Staphylococcus lugdunensis Not Detected     Streptococcus species Not Detected     Streptococcus agalactiae Not Detected     Streptococcus pneumoniae Not Detected     Streptococcus pyogenes Not Detected     Acinetobacter calcoaceticus/baumannii complex Not Detected     Bacteroides fragilis Not Detected     Enterobacterales Not Detected     Enterobacter cloacae complex Not Detected     Escherichia coli Not Detected     Klebsiella aerogenes Not Detected     Klebsiella oxytoca Not Detected     Klebsiella pneumoniae group Not Detected     Proteus Not Detected     Salmonella sp Not Detected     Serratia marcescens Not Detected     Haemophilus influenzae Not Detected     Neisseria meningtidis Not Detected     Pseudomonas aeruginosa Not Detected     Stenotrophomonas maltophilia Not Detected     Candida albicans Not Detected     Candida auris Not Detected     Candida glabrata Not Detected     Candida krusei Not Detected     Candida parapsilosis Not Detected     Candida tropicalis Not Detected     Cryptococcus neoformans/gattii Not Detected     CTX-M (ESBL ) Test Not Applicable     IMP  (Carbapenem resistant) Test Not Applicable     KPC resistance gene (Carbapenem resistant) Test Not Applicable     mcr-1  Test Not Applicable     mec A/C  Test Not Applicable     mec A/C and MREJ (MRSA) gene Test Not Applicable     NDM (Carbapenem resistant) Test Not Applicable     OXA-48-like (Carbapenem resistant) Test Not Applicable     van A/B (VRE gene) Not Detected     VIM (Carbapenem resistant) Test Not Applicable    Narrative:      Aerobic and anaerobic            I have reviewed all pertinent labs within the past 24 hours.    Estimated Creatinine Clearance: 15 mL/min (A) (based on SCr of 5.6 mg/dL (H)).    Diagnostic Results:  I have reviewed and interpreted all pertinent imaging results/findings within the past 24 hours.

## 2024-04-25 LAB
ALLENS TEST: ABNORMAL
ANION GAP SERPL CALC-SCNC: 11 MMOL/L (ref 8–16)
APTT PPP: 53.6 SEC (ref 21–32)
BACTERIA BLD CULT: NORMAL
BACTERIA BLD CULT: NORMAL
BASOPHILS # BLD AUTO: 0.02 K/UL (ref 0–0.2)
BASOPHILS NFR BLD: 0.5 % (ref 0–1.9)
BUN SERPL-MCNC: 55 MG/DL (ref 8–23)
CALCIUM SERPL-MCNC: 8.5 MG/DL (ref 8.7–10.5)
CHLORIDE SERPL-SCNC: 103 MMOL/L (ref 95–110)
CO2 SERPL-SCNC: 23 MMOL/L (ref 23–29)
CREAT SERPL-MCNC: 5.3 MG/DL (ref 0.5–1.4)
DIFFERENTIAL METHOD BLD: ABNORMAL
EOSINOPHIL # BLD AUTO: 0.1 K/UL (ref 0–0.5)
EOSINOPHIL NFR BLD: 3.2 % (ref 0–8)
ERYTHROCYTE [DISTWIDTH] IN BLOOD BY AUTOMATED COUNT: 18 % (ref 11.5–14.5)
EST. GFR  (NO RACE VARIABLE): 11.5 ML/MIN/1.73 M^2
FERRITIN SERPL-MCNC: 1052 NG/ML (ref 20–300)
GLUCOSE SERPL-MCNC: 97 MG/DL (ref 70–110)
HCO3 UR-SCNC: 25.4 MMOL/L (ref 24–28)
HCT VFR BLD AUTO: 21.4 % (ref 40–54)
HGB BLD-MCNC: 7.1 G/DL (ref 14–18)
IMM GRANULOCYTES # BLD AUTO: 0.06 K/UL (ref 0–0.04)
IMM GRANULOCYTES NFR BLD AUTO: 1.4 % (ref 0–0.5)
INR PPP: 2.9 (ref 0.8–1.2)
IRON SERPL-MCNC: 61 UG/DL (ref 45–160)
LDH SERPL L TO P-CCNC: 298 U/L (ref 110–260)
LYMPHOCYTES # BLD AUTO: 0.8 K/UL (ref 1–4.8)
LYMPHOCYTES NFR BLD: 18.3 % (ref 18–48)
MAGNESIUM SERPL-MCNC: 2.1 MG/DL (ref 1.6–2.6)
MCH RBC QN AUTO: 28.7 PG (ref 27–31)
MCHC RBC AUTO-ENTMCNC: 33.2 G/DL (ref 32–36)
MCV RBC AUTO: 87 FL (ref 82–98)
MONOCYTES # BLD AUTO: 0.4 K/UL (ref 0.3–1)
MONOCYTES NFR BLD: 9.7 % (ref 4–15)
NEUTROPHILS # BLD AUTO: 2.9 K/UL (ref 1.8–7.7)
NEUTROPHILS NFR BLD: 66.9 % (ref 38–73)
NRBC BLD-RTO: 0 /100 WBC
PCO2 BLDA: 45.5 MMHG (ref 35–45)
PH SMN: 7.36 [PH] (ref 7.35–7.45)
PHOSPHATE SERPL-MCNC: 4.1 MG/DL (ref 2.7–4.5)
PLATELET # BLD AUTO: 313 K/UL (ref 150–450)
PMV BLD AUTO: 10.5 FL (ref 9.2–12.9)
PO2 BLDA: 34 MMHG (ref 40–60)
POC BE: 0 MMOL/L
POC SATURATED O2: 62 % (ref 95–100)
POC TCO2: 27 MMOL/L (ref 24–29)
POCT GLUCOSE: 132 MG/DL (ref 70–110)
POCT GLUCOSE: 170 MG/DL (ref 70–110)
POCT GLUCOSE: 181 MG/DL (ref 70–110)
POCT GLUCOSE: 365 MG/DL (ref 70–110)
POTASSIUM SERPL-SCNC: 3.9 MMOL/L (ref 3.5–5.1)
PROTHROMBIN TIME: 30.1 SEC (ref 9–12.5)
RBC # BLD AUTO: 2.47 M/UL (ref 4.6–6.2)
RETICS/RBC NFR AUTO: 1.3 % (ref 0.4–2)
SAMPLE: ABNORMAL
SATURATED IRON: 27 % (ref 20–50)
SITE: ABNORMAL
SODIUM SERPL-SCNC: 137 MMOL/L (ref 136–145)
TOTAL IRON BINDING CAPACITY: 226 UG/DL (ref 250–450)
TRANSFERRIN SERPL-MCNC: 153 MG/DL (ref 200–375)
WBC # BLD AUTO: 4.31 K/UL (ref 3.9–12.7)

## 2024-04-25 PROCEDURE — 85045 AUTOMATED RETICULOCYTE COUNT: CPT | Performed by: HOSPITALIST

## 2024-04-25 PROCEDURE — 27000207 HC ISOLATION

## 2024-04-25 PROCEDURE — 27000923 HC TRIALYSIS CATHETER, ANY SIZE

## 2024-04-25 PROCEDURE — 27000248 HC VAD-ADDITIONAL DAY

## 2024-04-25 PROCEDURE — 80048 BASIC METABOLIC PNL TOTAL CA: CPT | Performed by: STUDENT IN AN ORGANIZED HEALTH CARE EDUCATION/TRAINING PROGRAM

## 2024-04-25 PROCEDURE — 99233 SBSQ HOSP IP/OBS HIGH 50: CPT | Mod: ,,, | Performed by: HOSPITALIST

## 2024-04-25 PROCEDURE — 85610 PROTHROMBIN TIME: CPT | Performed by: STUDENT IN AN ORGANIZED HEALTH CARE EDUCATION/TRAINING PROGRAM

## 2024-04-25 PROCEDURE — 99900035 HC TECH TIME PER 15 MIN (STAT)

## 2024-04-25 PROCEDURE — 25000003 PHARM REV CODE 250: Performed by: STUDENT IN AN ORGANIZED HEALTH CARE EDUCATION/TRAINING PROGRAM

## 2024-04-25 PROCEDURE — 84100 ASSAY OF PHOSPHORUS: CPT | Performed by: STUDENT IN AN ORGANIZED HEALTH CARE EDUCATION/TRAINING PROGRAM

## 2024-04-25 PROCEDURE — 82728 ASSAY OF FERRITIN: CPT | Performed by: HOSPITALIST

## 2024-04-25 PROCEDURE — 99233 SBSQ HOSP IP/OBS HIGH 50: CPT | Mod: ,,, | Performed by: INTERNAL MEDICINE

## 2024-04-25 PROCEDURE — 25000003 PHARM REV CODE 250: Performed by: INTERNAL MEDICINE

## 2024-04-25 PROCEDURE — 85730 THROMBOPLASTIN TIME PARTIAL: CPT | Performed by: STUDENT IN AN ORGANIZED HEALTH CARE EDUCATION/TRAINING PROGRAM

## 2024-04-25 PROCEDURE — 83735 ASSAY OF MAGNESIUM: CPT | Performed by: STUDENT IN AN ORGANIZED HEALTH CARE EDUCATION/TRAINING PROGRAM

## 2024-04-25 PROCEDURE — 83540 ASSAY OF IRON: CPT | Performed by: HOSPITALIST

## 2024-04-25 PROCEDURE — 83615 LACTATE (LD) (LDH) ENZYME: CPT | Performed by: STUDENT IN AN ORGANIZED HEALTH CARE EDUCATION/TRAINING PROGRAM

## 2024-04-25 PROCEDURE — 85025 COMPLETE CBC W/AUTO DIFF WBC: CPT | Performed by: STUDENT IN AN ORGANIZED HEALTH CARE EDUCATION/TRAINING PROGRAM

## 2024-04-25 PROCEDURE — 93750 INTERROGATION VAD IN PERSON: CPT | Mod: ,,, | Performed by: INTERNAL MEDICINE

## 2024-04-25 PROCEDURE — 63600175 PHARM REV CODE 636 W HCPCS: Performed by: INTERNAL MEDICINE

## 2024-04-25 PROCEDURE — 80100014 HC HEMODIALYSIS 1:1

## 2024-04-25 PROCEDURE — 20600001 HC STEP DOWN PRIVATE ROOM

## 2024-04-25 PROCEDURE — 97110 THERAPEUTIC EXERCISES: CPT | Mod: CQ

## 2024-04-25 RX ORDER — LIDOCAINE HYDROCHLORIDE AND EPINEPHRINE 10; 10 MG/ML; UG/ML
1 INJECTION, SOLUTION INFILTRATION; PERINEURAL ONCE
Qty: 1 ML | Refills: 0 | Status: DISCONTINUED | OUTPATIENT
Start: 2024-04-25 | End: 2024-04-27

## 2024-04-25 RX ADMIN — ACETAMINOPHEN 1000 MG: 500 TABLET ORAL at 08:04

## 2024-04-25 RX ADMIN — Medication 400 MG: at 12:04

## 2024-04-25 RX ADMIN — AMPICILLIN 2 G: 2 INJECTION, POWDER, FOR SOLUTION INTRAMUSCULAR; INTRAVENOUS at 08:04

## 2024-04-25 RX ADMIN — CEFTRIAXONE 2 G: 2 INJECTION, POWDER, FOR SOLUTION INTRAMUSCULAR; INTRAVENOUS at 09:04

## 2024-04-25 RX ADMIN — CEFTRIAXONE 2 G: 2 INJECTION, POWDER, FOR SOLUTION INTRAMUSCULAR; INTRAVENOUS at 12:04

## 2024-04-25 RX ADMIN — TRAZODONE HYDROCHLORIDE 25 MG: 50 TABLET ORAL at 08:04

## 2024-04-25 RX ADMIN — AMIODARONE HYDROCHLORIDE 400 MG: 200 TABLET ORAL at 12:04

## 2024-04-25 RX ADMIN — FAMOTIDINE 20 MG: 20 TABLET ORAL at 12:04

## 2024-04-25 RX ADMIN — MUPIROCIN: 20 OINTMENT TOPICAL at 12:04

## 2024-04-25 RX ADMIN — HYDRALAZINE HYDROCHLORIDE 75 MG: 50 TABLET ORAL at 05:04

## 2024-04-25 RX ADMIN — HYDRALAZINE HYDROCHLORIDE 75 MG: 50 TABLET ORAL at 09:04

## 2024-04-25 RX ADMIN — GABAPENTIN 100 MG: 100 CAPSULE ORAL at 12:04

## 2024-04-25 RX ADMIN — SODIUM BICARBONATE 325 MG: 325 TABLET ORAL at 03:04

## 2024-04-25 RX ADMIN — VENLAFAXINE 37.5 MG: 37.5 TABLET ORAL at 12:04

## 2024-04-25 RX ADMIN — GABAPENTIN 100 MG: 100 CAPSULE ORAL at 08:04

## 2024-04-25 RX ADMIN — ACETAMINOPHEN 1000 MG: 500 TABLET ORAL at 03:04

## 2024-04-25 RX ADMIN — HYDRALAZINE HYDROCHLORIDE 75 MG: 50 TABLET ORAL at 01:04

## 2024-04-25 RX ADMIN — SODIUM BICARBONATE 325 MG: 325 TABLET ORAL at 08:04

## 2024-04-25 RX ADMIN — AMPICILLIN 2 G: 2 INJECTION, POWDER, FOR SOLUTION INTRAMUSCULAR; INTRAVENOUS at 12:04

## 2024-04-25 RX ADMIN — MUPIROCIN 1 TUBE: 20 OINTMENT TOPICAL at 08:04

## 2024-04-25 RX ADMIN — SODIUM BICARBONATE 325 MG: 325 TABLET ORAL at 12:04

## 2024-04-25 NOTE — NURSING
R IJ dressing saturated with blood. Held pressure for 10 minutes. Occasional oozing of blood noted. Dr. Shen notified. Lido-epi ordered. Dr. Shen at bedside. No oozing present when assessed by Dr. Shen. Lido-epi deferred. Quick clot and gauze applied by Dr. Shen. No Bio-patch present. Central line dressing to be changed in next 48 hrs on 4/27/2024 at 1430.  Will continue to monitor.

## 2024-04-25 NOTE — CARE UPDATE
Care Update:     No acute events overnight. Patient in room 311/311 A. Blood glucose stable on current inpatient regimen.No hypoglycemia noted over the past 24-hours. Endocrine will continue to follow and manage insulin orders inpatient.       Steroid use- None.   Renal function-   Lab Results   Component Value Date    CREATININE 5.3 (H) 04/25/2024        Diet Renal Coumadin Restriction; Fluid - 1500mL; Isolation Tray - Regular [place]     POCT Glucose   Date Value Ref Range Status   04/25/2024 170 (H) 70 - 110 mg/dL Final   04/24/2024 201 (H) 70 - 110 mg/dL Final   04/24/2024 160 (H) 70 - 110 mg/dL Final   04/24/2024 217 (H) 70 - 110 mg/dL Final   04/24/2024 166 (H) 70 - 110 mg/dL Final   04/24/2024 103 70 - 110 mg/dL Final   04/23/2024 107 70 - 110 mg/dL Final   04/23/2024 207 (H) 70 - 110 mg/dL Final   04/23/2024 167 (H) 70 - 110 mg/dL Final   04/23/2024 133 (H) 70 - 110 mg/dL Final   04/22/2024 181 (H) 70 - 110 mg/dL Final   04/22/2024 184 (H) 70 - 110 mg/dL Final     Lab Results   Component Value Date    HGBA1C 7.4 (H) 04/17/2024       Endocrine  Type 2 diabetes mellitus without complication, without long-term current use of insulin  Endocrinology consulted for BG management.   BG goal 140-180     - Novolog (Insulin Aspart) prn for BG excursions [facility] SSI (150/50)  - BG checks AC/HS  - Hypoglycemia protocol in place     ** Please notify Endocrine for any change and/or advance in diet**  ** Please call Endocrine for any BG related issues **     Discharge Planning:   TBD. Please notify endocrinology prior to discharge.

## 2024-04-25 NOTE — NURSING
Primary and Dialysis Rn at the bedside. Pt's wife concern to use the R IJ Trialysis for HD because of the site bleeding overnight. Site looks CDI, no saturation or old drainage noted at this time. Wife requesting for x-ray to verify placement before HD. Dr. Shen notified. Chest x-ray stat ordered.

## 2024-04-25 NOTE — PROGRESS NOTES
04/25/2024  Jonathan ANAMARIA Ho Jr    Current provider:  Brayan Ocampo MD    Device interrogation:      4/25/2024     4:34 AM 4/25/2024    12:45 AM 4/24/2024    10:05 PM 4/24/2024     7:56 PM 4/24/2024     5:31 PM 4/24/2024    12:06 PM 4/24/2024     8:23 AM   TXP LVAD INTERROGATIONS   Type HeartMate3 HeartMate3 HeartMate3 HeartMate3 HeartMate3 HeartMate3 HeartMate3   Flow 4 3.9 3.8 3.9 3.8 3.8 4.1   Speed 5000 5000 5000 5000 5000 5000 5000   PI 4.2 4.7 5.2 5 6.1 6.1 4.6   Power ([place]) 3.4 3.4 3.4 3.5 3.5 3.5 3.4   LSL 4800 4800 4800 4800 4800 4800 4800   Pulsatility  Pulse Pulse Intermittent pulse Intermittent pulse Intermittent pulse Intermittent pulse          Rounded on [facility] [employer] to ensure all mechanical assist device settings (IABP or VAD) were appropriate and all parameters were within limits.  I was able to ensure all back up equipment was present, the staff had no issues, and the Perfusion Department daily rounding was complete.      For implantable VADs: Interrogation of Ventricular assist device was performed with analysis of device parameters and review of device function. I have personally reviewed the interrogation findings and agree with findings as stated.     In emergency, the nursing units have been notified to contact the perfusion department either by:  Calling [phone] from 630am to 4pm Mon thru Fri, utilizing the On-Call Finder functionality of Epic and searching for Perfusion, or by contacting the hospital  from 4pm to 630am and on weekends and asking to speak with the perfusionist on call.    7:42 AM

## 2024-04-25 NOTE — SUBJECTIVE & OBJECTIVE
Interval History:   Overnight patient had some bleeding from his trialysis line which resolved w/ quickclot . No acute complaints. Nephrology to take patient for HD today.     Continuous Infusions:  Current Facility-Administered Medications   Medication Dose Route Frequency Last Rate Last Admin     Scheduled Meds:  Current Facility-Administered Medications   Medication Dose Route Frequency    amiodarone  400 mg Oral Daily    ampicillin IV (PEDS and ADULTS)  2 g Intravenous Q12H    cefTRIAXone (Rocephin) IV (PEDS and ADULTS)  2 g Intravenous Q12H    famotidine  20 mg Oral Daily    gabapentin  100 mg Oral BID    hydrALAZINE  75 mg Oral Q8H    magnesium oxide  400 mg Oral Daily    mupirocin   Nasal BID    sodium bicarbonate  325 mg Oral TID    traZODone  25 mg Oral QHS    venlafaxine  37.5 mg Oral Daily     PRN Meds:  Current Facility-Administered Medications:     acetaminophen, 1,000 mg, Oral, Q8H PRN    dextrose 10%, 12.5 g, Intravenous, PRN    dextrose 10%, 25 g, Intravenous, PRN    glucagon (human recombinant), 1 mg, Intramuscular, PRN    glucose, 16 g, Oral, PRN    glucose, 24 g, Oral, PRN    insulin aspart U-100, 0-5 Units, Subcutaneous, QID (AC + HS) PRN    senna-docusate 8.6-50 mg, 2 tablet, Oral, Daily PRN    Review of patient's allergies indicates:  No Known Allergies  Objective:     Vital Signs (Most Recent):  Temp: 98.2 °F (36.8 °C) (04/25/24 0745)  Pulse: 84 (04/25/24 1000)  Resp: 16 (04/25/24 0745)  BP: (!) 92/53 (04/25/24 1000)  SpO2: 98 % (04/25/24 0745) Vital Signs (24h Range):  Temp:  [97 °F (36.1 °C)-98.2 °F (36.8 °C)] 98.2 °F (36.8 °C)  Pulse:  [] 84  Resp:  [16-18] 16  SpO2:  [97 %-98 %] 98 %  BP: ()/(0-79) 92/53     Patient Vitals for the past 72 hrs (Last 3 readings):   Weight   04/25/24 0455 78.5 kg (173 lb 1 oz)   04/24/24 0509 77.4 kg (170 lb 10.2 oz)   04/23/24 0806 77.4 kg (170 lb 10.2 oz)     Body mass index is 22.22 kg/m².      Intake/Output Summary (Last 24 hours) at 4/25/2024  1030  Last data filed at 4/24/2024 1916  Gross per 24 hour   Intake 1098.5 ml   Output 225 ml   Net 873.5 ml       Hemodynamic Parameters:  CVP:  [6 mmHg] 6 mmHg    Telemetry: SR with PVC's       Physical Exam  Constitutional:       Appearance: He is ill-appearing.   HENT:      Head: Normocephalic and atraumatic.   Eyes:      Conjunctiva/sclera: Conjunctivae normal.      Pupils: Pupils are equal, round, and reactive to light.   Neck:      Comments: JVP not visible on exam  Cardiovascular:      Rate and Rhythm: Normal rate and regular rhythm.      Comments: Smooth VAD hum  Pulmonary:      Effort: Pulmonary effort is normal.      Comments: Clear to auscultation  Abdominal:      General: Bowel sounds are normal.      Palpations: Abdomen is soft.   Musculoskeletal:         General: No swelling. Normal range of motion.      Cervical back: Normal range of motion and neck supple.   Skin:     General: Skin is warm and dry.      Capillary Refill: Capillary refill takes 2 to 3 seconds.   Neurological:      General: No focal deficit present.      Mental Status: He is alert and oriented to person, place, and time.            Significant Labs:  CBC:  Recent Labs   Lab 04/23/24  0422 04/24/24  0425 04/25/24  0432   WBC 4.24 4.30 4.31   RBC 2.78* 2.83* 2.47*   HGB 8.0* 7.9* 7.1*   HCT 23.3* 24.0* 21.4*    319 313   MCV 84 85 87   MCH 28.8 27.9 28.7   MCHC 34.3 32.9 33.2     BNP:  Recent Labs   Lab 04/19/24  0530 04/22/24  0530 04/24/24  0425   * 1,116* 1,263*     CMP:  Recent Labs   Lab 04/19/24  0530 04/20/24  0234 04/22/24  0530 04/23/24  0422 04/23/24  1533 04/24/24  0425 04/25/24  0432      < > 105 133*  --  95 97   CALCIUM 9.2   < > 8.5* 8.7  --  8.6* 8.5*   ALBUMIN 3.0*  --  2.5*  --   --  2.5*  --    PROT 8.2  --  6.9  --   --  7.2  --    *   < > 129* 136  --  135* 137   K 3.6   < > 3.5 3.9 4.0 3.9 3.9   CO2 24   < > 21* 23  --  22* 23   CL 92*   < > 91* 102  --  101 103   BUN 98*   < > 114* 58*   --  57* 55*   CREATININE 6.0*   < > 8.6* 5.4*  --  5.6* 5.3*   ALKPHOS 139*  --  154*  --   --  201*  --    *  --  230*  --   --  186*  --    *  --  168*  --   --  140*  --    BILITOT 0.8  --  0.2  --   --  0.2  --     < > = values in this interval not displayed.      Coagulation:   Recent Labs   Lab 04/23/24 0422 04/24/24 0425 04/25/24 0432   INR 6.9* 3.7* 2.9*   APTT 65.1* 60.0* 53.6*     LDH:  Recent Labs   Lab 04/23/24 0422 04/24/24 0425 04/25/24 0432   * 372* 298*     Microbiology:  Microbiology Results (last 7 days)       Procedure Component Value Units Date/Time    Blood culture [[id number]] Collected: 04/20/24 0234    Order Status: Completed Specimen: Blood Updated: 04/25/24 0612     Blood Culture, Routine No growth after 5 days.    Blood culture [[id number]] Collected: 04/20/24 0240    Order Status: Completed Specimen: Blood Updated: 04/25/24 0612     Blood Culture, Routine No growth after 5 days.    Blood culture [[id number]] Collected: 04/18/24 0534    Order Status: Completed Specimen: Blood Updated: 04/23/24 1012     Blood Culture, Routine No growth after 5 days.    Blood culture [[id number]] Collected: 04/18/24 0535    Order Status: Completed Specimen: Blood Updated: 04/23/24 1012     Blood Culture, Routine No growth after 5 days.    Blood culture x two cultures. Draw prior to antibiotics. [[id number]]  (Abnormal) Collected: 04/16/24 2322    Order Status: Completed Specimen: Blood from Peripheral, Hand, Right Updated: 04/19/24 1501     Blood Culture, Routine Gram stain aer bottle: Gram positive cocci      Gram stain nata bottle: Gram positive cocci      Positive results previously called 04/17/2024 15:08      ENTEROCOCCUS FAECALIS  For susceptibility see order #[id number]      Narrative:      Aerobic and anaerobic    Blood culture x two cultures. Draw prior to antibiotics. [[id number]]  (Abnormal)  (Susceptibility) Collected: 04/16/24 4370    Order Status: Completed Specimen:  Blood from Peripheral, Hand, Left Updated: 04/19/24 1500     Blood Culture, Routine Gram stain aer bottle: Gram positive cocci      Gram stain nata bottle: Gram positive cocci      Results called to and read back by: Rachael Horan RN 04/17/2024  15:08      ENTEROCOCCUS FAECALIS    Narrative:      Aerobic and anaerobic            I have reviewed all pertinent labs within the past 24 hours.    Estimated Creatinine Clearance: 16 mL/min (A) (based on SCr of 5.3 mg/dL (H)).    Diagnostic Results:  I have reviewed and interpreted all pertinent imaging results/findings within the past 24 hours.

## 2024-04-25 NOTE — NURSING
3 hours HD tx completed. No fluid removed.    Patient tolerated well.    Blood returned. Catheter locked with NS. Clamped and capped.    Report given to JOAQUINA Crowe RN.

## 2024-04-25 NOTE — PROGRESS NOTES
04/25/24 1640 04/25/24 1740   Vital Signs   /84 (!) 90/0   MAP (mmHg) 94  --    BP Location Left arm Left arm   BP Method  --  Doppler   Patient Position Sitting Lying     Dr. Shen aware. Will continue to monitor.

## 2024-04-25 NOTE — PROGRESS NOTES
Interdisciplinary Rounds Report:   Attended interdisciplinary rounds with the [facility]/CTS services including the LVAD Coordinators, social workers, cardiologists, surgeons,  PT/OT/Speech, dietician, and unit charge nurses. Discussed patient status, plan of care, goals of care, including DC date, and post discharge needs. Plan of care will be discussed with the patient and/or family per the physician while rounding on the floor. This is a recurring meeting that is medically and socially necessary to collaborate with the interdisciplinary team to assist patient needs and safe discharge.

## 2024-04-25 NOTE — PROGRESS NOTES
[facility] - Cardiology Stepdown  Heart Transplant  Progress Note    Patient Name: Radha Abbott  MRN: [id number]  Admission Date: 4/16/2024  Hospital Length of Stay: 9 days  Attending Physician: Brayan Ocampo MD  Primary Care Provider: Vasu Kong MD  Principal Problem:Pneumonia due to COVID-19 virus    Subjective:   Interval History:   Overnight patient had some bleeding from his trialysis line which resolved w/ quickclot . No acute complaints. Nephrology to take patient for HD today.     Continuous Infusions:  Current Facility-Administered Medications   Medication Dose Route Frequency Last Rate Last Admin     Scheduled Meds:  Current Facility-Administered Medications   Medication Dose Route Frequency    amiodarone  400 mg Oral Daily    ampicillin IV (PEDS and ADULTS)  2 g Intravenous Q12H    cefTRIAXone (Rocephin) IV (PEDS and ADULTS)  2 g Intravenous Q12H    famotidine  20 mg Oral Daily    gabapentin  100 mg Oral BID    hydrALAZINE  75 mg Oral Q8H    magnesium oxide  400 mg Oral Daily    mupirocin   Nasal BID    sodium bicarbonate  325 mg Oral TID    traZODone  25 mg Oral QHS    venlafaxine  37.5 mg Oral Daily     PRN Meds:  Current Facility-Administered Medications:     acetaminophen, 1,000 mg, Oral, Q8H PRN    dextrose 10%, 12.5 g, Intravenous, PRN    dextrose 10%, 25 g, Intravenous, PRN    glucagon (human recombinant), 1 mg, Intramuscular, PRN    glucose, 16 g, Oral, PRN    glucose, 24 g, Oral, PRN    insulin aspart U-100, 0-5 Units, Subcutaneous, QID (AC + HS) PRN    senna-docusate 8.6-50 mg, 2 tablet, Oral, Daily PRN    Review of patient's allergies indicates:  No Known Allergies  Objective:     Vital Signs (Most Recent):  Temp: 98.2 °F (36.8 °C) (04/25/24 0745)  Pulse: 84 (04/25/24 1000)  Resp: 16 (04/25/24 0745)  BP: (!) 92/53 (04/25/24 1000)  SpO2: 98 % (04/25/24 0745) Vital Signs (24h Range):  Temp:  [97 °F (36.1 °C)-98.2 °F (36.8 °C)] 98.2 °F (36.8 °C)  Pulse:  [] 84  Resp:  [16-18]  16  SpO2:  [97 %-98 %] 98 %  BP: ()/(0-79) 92/53     Patient Vitals for the past 72 hrs (Last 3 readings):   Weight   04/25/24 0455 78.5 kg (173 lb 1 oz)   04/24/24 0509 77.4 kg (170 lb 10.2 oz)   04/23/24 0806 77.4 kg (170 lb 10.2 oz)     Body mass index is 22.22 kg/m².      Intake/Output Summary (Last 24 hours) at 4/25/2024 1030  Last data filed at 4/24/2024 1916  Gross per 24 hour   Intake 1098.5 ml   Output 225 ml   Net 873.5 ml       Hemodynamic Parameters:  CVP:  [6 mmHg] 6 mmHg    Telemetry: SR with PVC's       Physical Exam  Constitutional:       Appearance: He is ill-appearing.   HENT:      Head: Normocephalic and atraumatic.   Eyes:      Conjunctiva/sclera: Conjunctivae normal.      Pupils: Pupils are equal, round, and reactive to light.   Neck:      Comments: JVP not visible on exam  Cardiovascular:      Rate and Rhythm: Normal rate and regular rhythm.      Comments: Smooth VAD hum  Pulmonary:      Effort: Pulmonary effort is normal.      Comments: Clear to auscultation  Abdominal:      General: Bowel sounds are normal.      Palpations: Abdomen is soft.   Musculoskeletal:         General: No swelling. Normal range of motion.      Cervical back: Normal range of motion and neck supple.   Skin:     General: Skin is warm and dry.      Capillary Refill: Capillary refill takes 2 to 3 seconds.   Neurological:      General: No focal deficit present.      Mental Status: He is alert and oriented to person, place, and time.            Significant Labs:  CBC:  Recent Labs   Lab 04/23/24  0422 04/24/24  0425 04/25/24  0432   WBC 4.24 4.30 4.31   RBC 2.78* 2.83* 2.47*   HGB 8.0* 7.9* 7.1*   HCT 23.3* 24.0* 21.4*    319 313   MCV 84 85 87   MCH 28.8 27.9 28.7   MCHC 34.3 32.9 33.2     BNP:  Recent Labs   Lab 04/19/24  0530 04/22/24  0530 04/24/24  0425   * 1,116* 1,263*     CMP:  Recent Labs   Lab 04/19/24  0530 04/20/24  0234 04/22/24  0530 04/23/24  0422 04/23/24  1533 04/24/24  0425 04/25/24  0432       < > 105 133*  --  95 97   CALCIUM 9.2   < > 8.5* 8.7  --  8.6* 8.5*   ALBUMIN 3.0*  --  2.5*  --   --  2.5*  --    PROT 8.2  --  6.9  --   --  7.2  --    *   < > 129* 136  --  135* 137   K 3.6   < > 3.5 3.9 4.0 3.9 3.9   CO2 24   < > 21* 23  --  22* 23   CL 92*   < > 91* 102  --  101 103   BUN 98*   < > 114* 58*  --  57* 55*   CREATININE 6.0*   < > 8.6* 5.4*  --  5.6* 5.3*   ALKPHOS 139*  --  154*  --   --  201*  --    *  --  230*  --   --  186*  --    *  --  168*  --   --  140*  --    BILITOT 0.8  --  0.2  --   --  0.2  --     < > = values in this interval not displayed.      Coagulation:   Recent Labs   Lab 04/23/24  0422 04/24/24 0425 04/25/24  0432   INR 6.9* 3.7* 2.9*   APTT 65.1* 60.0* 53.6*     LDH:  Recent Labs   Lab 04/23/24  0422 04/24/24  0425 04/25/24  0432   * 372* 298*     Microbiology:  Microbiology Results (last 7 days)       Procedure Component Value Units Date/Time    Blood culture [[id number]] Collected: 04/20/24 0234    Order Status: Completed Specimen: Blood Updated: 04/25/24 0612     Blood Culture, Routine No growth after 5 days.    Blood culture [[id number]] Collected: 04/20/24 0240    Order Status: Completed Specimen: Blood Updated: 04/25/24 0612     Blood Culture, Routine No growth after 5 days.    Blood culture [[id number]] Collected: 04/18/24 0534    Order Status: Completed Specimen: Blood Updated: 04/23/24 1012     Blood Culture, Routine No growth after 5 days.    Blood culture [[id number]] Collected: 04/18/24 0535    Order Status: Completed Specimen: Blood Updated: 04/23/24 1012     Blood Culture, Routine No growth after 5 days.    Blood culture x two cultures. Draw prior to antibiotics. [[id number]]  (Abnormal) Collected: 04/16/24 2322    Order Status: Completed Specimen: Blood from Peripheral, Hand, Right Updated: 04/19/24 1501     Blood Culture, Routine Gram stain aer bottle: Gram positive cocci      Gram stain nata bottle: Gram positive cocci       Positive results previously called 04/17/2024 15:08      ENTEROCOCCUS FAECALIS  For susceptibility see order #[id number]      Narrative:      Aerobic and anaerobic    Blood culture x two cultures. Draw prior to antibiotics. [[id number]]  (Abnormal)  (Susceptibility) Collected: 04/16/24 9957    Order Status: Completed Specimen: Blood from Peripheral, Hand, Left Updated: 04/19/24 1500     Blood Culture, Routine Gram stain aer bottle: Gram positive cocci      Gram stain nata bottle: Gram positive cocci      Results called to and read back by: Rachael Horan RN 04/17/2024  15:08      ENTEROCOCCUS FAECALIS    Narrative:      Aerobic and anaerobic            I have reviewed all pertinent labs within the past 24 hours.    Estimated Creatinine Clearance: 16 mL/min (A) (based on SCr of 5.3 mg/dL (H)).    Diagnostic Results:  I have reviewed and interpreted all pertinent imaging results/findings within the past 24 hours.  Assessment and Plan:     Mr. Radha Abbott is a very pleasant 63 yo male with stage D HFrEF, ICMP with previous admission for ADHF/CS. He did undergo HM3 placement placed by Dr Washington on 12/1/23. Lengthy post op course complicated by vasoplegia(requiring Giaprezza), debility, malnutrition/impaired swallowing, and renal failure requiring HD(since weaned off). Once medically stable, he was transferred to Rehab for further PT/OT/ST. Of note, pt left with permacath still in place. He has done really well in rehab and now has continued to progress well at home. He is on a very high dose of Bumex 4 mg twice daily. VAD speed is at 5000 rpm. No VAD alarms noted on interrogation occasional PI events . Today he presents to the ED for infectious workup due to complaints of fevers of 101.5F associated with fatigue, generalized weakness, lower back pain and HA. He also reports a decline in appetite. Wife changes his driveline dressing every Saturday and has not noticed any purulance with dressing changes. Body aches/weakness  started around 2am last night followed by fevers around 5pm. He has been taking tylenol which has helped brng down fevers but has not helped with HA. HA is 5/10 in intensity. He reports slight increase in body weight/ fluid retention yesterday for which he took a dose of Metolazone with a drop in weight by ~5lbs. Denies any SOB, LE swelling at this time.      2D Echo with CFD done on 3/26/2024  LVAD: There is a Heartmate III LVAD running at 5000 RPM. The aortic valve does not open. The ventricular septum is at midline. Inflow cannula well seated at the apex. Outflow graft not visualized.    Left Ventricle: The left ventricle is moderately dilated. Normal wall thickness. Global hypokinesis present. Septal motion is abnormal. There is severely reduced systolic function with a visually estimated ejection fraction of 5 - 10%. There is diastolic dysfunction but grade cannot be determined.    Right Ventricle: Mild right ventricular enlargement. Wall thickness is normal. Right ventricle wall motion  is normal. Systolic function is normal. Pacemaker lead present in the ventricle.    Left Atrium: Left atrium is severely dilated.    Right Atrium: Right atrium is moderately dilated.    Mitral Valve: The mitral valve is repaired with an Noble stitch. There is mild regurgitation.    IVC/SVC: Normal venous pressure at 3 mmHg.    * Pneumonia due to COVID-19 virus  -4/16/24 he presented to the ED for infectious workup due to complaints of fevers of 101.5F associated with fatigue, generalized weakness, lower back pain and HA that started this morning. No evidence of drive line infection. No history of line infection.   - COVID +ve  - CT chest/ abdomen / pelvis 4/16 showed new RML opacity  - S/p remdesavir.   - continue tylenol for fevers    IVÁN (acute kidney injury)  Most likely 2/2 to ATN on CKD IV.      - HD as per nephrology.   - patient starting to produce urine. Creatinine stable.   - Will continue to monitor.  - Nephrology  consulted. Appreciate recs.         Bacteremia  -blood cxs from 4/16 +ve for E faecalis   -ID following  -E faecalis is a known cause of endocarditis and AICD lead infections.   -TTE was negative for vegetation.  However TTE does not rule out valvular or lead vegetation. Will treat this as endocarditis with 6 weeks of IV antibiotics followed by suppressive therapy   -Currently on Ampicillin and Ceftriaxone for 6wk course as per ID.   -Repeat cultures from 4/20 are negative thus far    Hyponatremia  -POA  -Daily labs       Hypertension  Continue hydralazine    Headache  C/o of HA on admit with no improvement despite tylenol  - CTH done 4/16 with no acute processes    LVAD (left ventricular assist device) present  -HeartMate 3 Implanted  12/1/2023  as DT  -Cont Coumadin, dosing by PharmD.  Goal INR 2.0-3.0 . Supratherapeutic today but improving after PO vit K 2.5mg yesterday.   -Antiplatelets Not on ASA due to low H/H on implant   -LDH is stable overall today. Will continue to monitor daily.  -Speed set at  5000     -Interrogation notable for no events  -ECHO 4/18/24 with speed at 5000 EF: 10-15%, LVEDD: 6.1cm, RV size normal function mod reduced   -RHC 1/22/24 with RA 13, PCWP 22, PA 50/25, CO 5.8, CI 3.0   -Not listed for OHTx, declined for OHTX due to comorbidities     Procedure: Device Interrogation Including analysis of device parameters  Current Settings: Ventricular Assist Device  Review of device function is stable/unstable stable        4/25/2024     7:45 AM 4/25/2024     4:34 AM 4/25/2024    12:45 AM 4/24/2024    10:05 PM 4/24/2024     7:56 PM 4/24/2024     5:31 PM 4/24/2024    12:06 PM   TXP LVAD INTERROGATIONS   Type HeartMate3 HeartMate3 HeartMate3 HeartMate3 HeartMate3 HeartMate3 HeartMate3   Flow 3.8 4 3.9 3.8 3.9 3.8 3.8   Speed 5000 5000 5000 5000 5000 5000 5000   PI 4.2 4.2 4.7 5.2 5 6.1 6.1   Power ([place]) 3.5 3.4 3.4 3.4 3.5 3.5 3.5   LSL 4800 4800 4800 4800 4800 4800 4800   Pulsatility No Pulse   Pulse Pulse Intermittent pulse Intermittent pulse Intermittent pulse         Type 2 diabetes mellitus without complication, without long-term current use of insulin  -SSI    Chronic combined systolic and diastolic heart failure  -ICM  -Last 2D Echo  4/18/24 : LVEF 10-15%, LVEDD  6.1 cm  -Euvolemic on examination today  -Was on Bumex 4mg BID at home.  Holding due to IVÁN   -GDMT with Hydralazine  -2g Na dietary restriction, 1500 mL fluid restriction, strict I/Os      Ventricular tachycardia  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        Angela Shen MD  Heart Transplant  [facility] - Cardiology Stepdown

## 2024-04-25 NOTE — PROGRESS NOTES
[facility]  Nephrology  Progress Note    Patient Name: Radha Abbott  MRN: [id number]  Admission Date: 4/16/2024  Hospital Length of Stay: 9 days  Attending Provider: Brayan Ocampo MD   Primary Care Physician: Vasu Kong MD  Principal Problem:Pneumonia due to COVID-19 virus    Subjective:     HPI:  65 yo male with stage D HFrEF, ICMP with previous admission for ADHF/CS. S/p LVAD placement 12/2023 Lengthy post op course complicated by vasoplegia(requiring Giaprezza), debility, malnutrition/impaired swallowing, and renal failure requiring HD(since weaned off). presents to the ED for infectious workup due to complaints of fevers of 101.5F. Patient receiving abx, for presumed endocarditis/bactermia. Hospital course complicated by covid positive and oliguric IVÁN.     Concerning renal hx, patient following with [facility] nephrology Dr. Mendez in nephrology clinic. Baseline Scr ~4 with CKD IV/V, poor reserve.     Nephrology consulted for IVÁN    Interval History:   HD done today, no UF removed.  Would hold off further HD sessions and monitor for renal recovery, if worsening of renal functions noted in coming 2 days, then TDC can be placed.  Can resume on Bumax 4mg BID.    Review of patient's allergies indicates:  No Known Allergies  Current Facility-Administered Medications   Medication Dose Route Frequency Provider Last Rate Last Admin    acetaminophen tablet 1,000 mg  1,000 mg Oral Q8H PRN Lucy Brand MD   1,000 mg at 04/25/24 1542    amiodarone tablet 400 mg  400 mg Oral Daily Lucy Brand MD   400 mg at 04/25/24 1220    ampicillin (OMNIPEN) 2 g in sodium chloride 0.9 % 100 mL IVPB (MB+)  2 g Intravenous Q12H NneduAxel MD   Stopped at 04/25/24 1322    cefTRIAXone (ROCEPHIN) 2 g in dextrose 5 % in water (D5W) 100 mL IVPB (MB+)  2 g Intravenous Q12H NnsuuAxel MD   Stopped at 04/25/24 1254    dextrose 10% bolus 125 mL 125 mL  12.5 g Intravenous PRN Erasmo Parrish,  COREEN TAMAYO        dextrose 10% bolus 250 mL 250 mL  25 g Intravenous PRN Erasmo Parrish DNP, FNP        famotidine tablet 20 mg  20 mg Oral Daily Lucy Brand MD   20 mg at 04/25/24 1220    gabapentin capsule 100 mg  100 mg Oral BID Javed Vivas MD   100 mg at 04/25/24 1220    glucagon (human recombinant) injection 1 mg  1 mg Intramuscular PRN Erasmo Parrish DNP, FNP        glucose chewable tablet 16 g  16 g Oral PRN Erasmo Parrish DNP, FNP        glucose chewable tablet 24 g  24 g Oral PRN Erasmo Parrish DNP, FNP        hydrALAZINE tablet 75 mg  75 mg Oral Q8H Lucy Brand MD   75 mg at 04/25/24 1345    insulin aspart U-100 pen 0-5 Units  0-5 Units Subcutaneous QID (AC + HS) PRN Erasmo Parrish DNP, FNP        LIDOcaine-EPINEPHrine 1%-1:100,000 injection 1 mL  1 mL Epidural Once Angela Shen MD        magnesium oxide tablet 400 mg  400 mg Oral Daily Lucy Brand MD   400 mg at 04/25/24 1220    mupirocin 2 % ointment   Nasal BID Brayan Ocampo MD   Given at 04/25/24 1226    senna-docusate 8.6-50 mg per tablet 2 tablet  2 tablet Oral Daily PRN Lucy Brand MD        sodium bicarbonate tablet 325 mg  325 mg Oral TID Lucy Brand MD   325 mg at 04/25/24 1507    trazodone split tablet 25 mg  25 mg Oral QHS Lucy Brand MD   25 mg at 04/24/24 2048    venlafaxine tablet 37.5 mg  37.5 mg Oral Daily Lucy Brand MD   37.5 mg at 04/25/24 1220       Objective:     Vital Signs (Most Recent):  Temp: 97.9 °F (36.6 °C) (04/25/24 1640)  Pulse: 81 (04/25/24 1640)  Resp: 18 (04/25/24 1640)  BP: 109/84 (04/25/24 1640)  SpO2: 98 % (04/25/24 0745) Vital Signs (24h Range):  Temp:  [97 °F (36.1 °C)-98.2 °F (36.8 °C)] 97.9 °F (36.6 °C)  Pulse:  [] 81  Resp:  [16-18] 18  SpO2:  [97 %-98 %] 98 %  BP: ()/(0-84) 109/84     Weight:  (he was havein disease) (04/25/24 1010)  Body mass index is 22.22 kg/m².  Body surface area is 2.02 meters  squared.    I/O last 3 completed shifts:  In: 2244.5 [P.O.:2244.5]  Out: 925 [Urine:925]     Physical Exam  Constitutional:       Appearance: Normal appearance.   Pulmonary:      Effort: Pulmonary effort is normal. No respiratory distress.   Musculoskeletal:      Right lower leg: No edema.      Left lower leg: No edema.   Neurological:      Mental Status: He is alert.          Significant Labs:  BMP:   Recent Labs   Lab 04/25/24  0432   GLU 97      K 3.9      CO2 23   BUN 55*   CREATININE 5.3*   CALCIUM 8.5*   MG 2.1     CBC:   Recent Labs   Lab 04/25/24  0432   WBC 4.31   RBC 2.47*   HGB 7.1*   HCT 21.4*      MCV 87   MCH 28.7   MCHC 33.2        Significant Imaging:  Labs: Reviewed  Assessment/Plan:     Renal/  IVÁN (acute kidney injury)  Anuric IVÁN on baseline CKD IV  Baseline Scr ~4; Scr at time of consult ~8.3  UA Trace protein; bland otherwise.   UPCR 0.61g  Renal US: not avaialbe. In 2023 showed MRD.  Suspect ATN given rapid increase in Scr and anuria. Patient with prior CKD V poor reserve. Chart reviewed in depth, no inciting cause of IVÁN (normal vanc troughs, no obvious nephrotoxins, normal CPK). Suspect insult occurred in setting of COVID/bacteremia.     Plan;  -HD done today, will hold off further RRT sessions, will monitor kidney functions if not improving can place TDC placement.  -Hold off diuretics for now..  -Can resume bumax 4mg BID if concerns for volume overload.  - renally dose all medications  - avoid nephrotoxins as much as possible.     ID  Bacteremia  COVID positive  E. Fecalis bacteremia  Per primary team.         Thank you for your consult. I will follow-up with patient. Please contact us if you have any additional questions.    Kamilla Angela MD  Nephrology  [facility]

## 2024-04-25 NOTE — PT/OT/SLP PROGRESS
Physical Therapy Treatment    Patient Name:  Radha Abbott   MRN:  [id number]    Recommendations:     Discharge Recommendations: Low Intensity Therapy  Discharge Equipment Recommendations: walker, rolling  Barriers to discharge: None    Assessment:     Radha Abbott is a 64 y.o. male admitted with a medical diagnosis of Pneumonia due to COVID-19 virus.  He presents with the following impairments/functional limitations: weakness, impaired endurance, impaired functional mobility, gait instability, impaired cardiopulmonary response to activity.  Pt decline gait training, but agreeable to seated therex. Pt completed seated therex with no issues.  Patient continues to demonstrate the need for low intensity therapy on a scheduled basis exhibited by decreased independence with functional mobility.    Rehab Prognosis: Good; patient would benefit from acute skilled PT services to address these deficits and reach maximum level of function.    Recent Surgery: * No surgery found *      Plan:     During this hospitalization, patient to be seen 3 x/week to address the identified rehab impairments via gait training, therapeutic activities, therapeutic exercises, neuromuscular re-education and progress toward the following goals:    Plan of Care Expires:  05/17/24    Subjective     Chief Complaint: bleeding from RIJ earlier today   Pain/Comfort:  Pain Rating 1: 8/10  Location - Side 1: Right  Location - Orientation 1: generalized  Location 1: neck  Pain Addressed 1: Reposition, Nurse notified  Pain Rating Post-Intervention 1: 8/10      Objective:     Communicated with nurse prior to session.  Patient found up in chair with LVAD, telemetry upon PT entry to room.   Pt found and maintain on wall power.    General Precautions: Standard, airborne, contact, droplet, fall, LVAD  Orthopedic Precautions: N/A  Braces: N/A  Respiratory Status: Room air     Functional Mobility: pt decline, but agreeable to seated therex     AM-PAC 6 CLICK  MOBILITY  Turning over in bed (including adjusting bedclothes, sheets and blankets)?: 3  Sitting down on and standing up from a chair with arms (e.g., wheelchair, bedside commode, etc.): 3  Moving from lying on back to sitting on the side of the bed?: 3  Moving to and from a bed to a chair (including a wheelchair)?: 3  Need to walk in hospital room?: 3  Climbing 3-5 steps with a railing?: 3  Basic Mobility Total Score: 18     Treatment & Education:  Seated BLE therex x20 reps:  ankle pumps, LAQ, marching, and hip abd/add  Cues for full ROM.   Patient educated on role of therapy, goals of session, and benefits of out of bed mobility.   Instructed on use of call button and importance of calling nursing staff for assistance with mobility   Questions/concerns addressed within PTA scope of practice  Pt verbalized understanding.    Patient left up in chair with all lines intact, call button in reach, nurse notified, and spouse present..    GOALS:   Multidisciplinary Problems       Physical Therapy Goals          Problem: Physical Therapy    Goal Priority Disciplines Outcome Goal Variances Interventions   Physical Therapy Goal     PT, PT/OT Progressing     Description: Goals to be met by: 24     Patient will increase functional independence with mobility by performin. Supine to sit with Modified [place]  2. Sit to stand transfer with Modified [place]  3. Bed to chair transfer with Modified [place] using LRAD  4. Gait  x 150 feet with Supervision using LRAD.   5. Lower extremity exercise program x15 reps per handout, with independence                         Time Tracking:     PT Received On: 24  PT Start Time: 1530     PT Stop Time: 1540  PT Total Time (min): 10 min     Billable Minutes: Therapeutic Exercise 10    Treatment Type: Treatment  PT/PTA: PTA     Number of PTA visits since last PT visit: 2024

## 2024-04-25 NOTE — PLAN OF CARE
Problem: Adult Inpatient Plan of Care  Goal: Plan of Care Review  Outcome: Not Progressing  Goal: Patient-Specific Goal (Individualized)  Outcome: Not Progressing  Goal: Absence of Hospital-Acquired Illness or Injury  Outcome: Not Progressing  Goal: Optimal Comfort and Wellbeing  Outcome: Not Progressing  Goal: Readiness for Transition of Care  Outcome: Not Progressing     Problem: Diabetes Comorbidity  Goal: Blood Glucose Level Within Targeted Range  Outcome: Not Progressing     Problem: Infection Progression (Sepsis/Septic Shock)  Goal: Absence of Infection Signs and Symptoms  Outcome: Not Progressing

## 2024-04-25 NOTE — NURSING
Ok to use Right IJ Trialysis per MD orders.      Bedside HD treatment initiated. Covid precautions maintained.     Patient AAOx4. Wife at bedside.     UF goal 1 L as tolerated

## 2024-04-25 NOTE — SUBJECTIVE & OBJECTIVE
Interval History:   HD done today, no UF removed.  Would hold off further HD sessions and monitor for renal recovery, if worsening of renal functions noted in coming 2 days, then TDC can be placed.  Can resume on Bumax 4mg BID.    Review of patient's allergies indicates:  No Known Allergies  Current Facility-Administered Medications   Medication Dose Route Frequency Provider Last Rate Last Admin    acetaminophen tablet 1,000 mg  1,000 mg Oral Q8H PRN Lucy Brand MD   1,000 mg at 04/25/24 1542    amiodarone tablet 400 mg  400 mg Oral Daily Lucy Brand MD   400 mg at 04/25/24 1220    ampicillin (OMNIPEN) 2 g in sodium chloride 0.9 % 100 mL IVPB (MB+)  2 g Intravenous Q12H Axel Caban MD   Stopped at 04/25/24 1322    cefTRIAXone (ROCEPHIN) 2 g in dextrose 5 % in water (D5W) 100 mL IVPB (MB+)  2 g Intravenous Q12H Axel Caban MD   Stopped at 04/25/24 1254    dextrose 10% bolus 125 mL 125 mL  12.5 g Intravenous PRN Erasmo Parrish DNP, FNP        dextrose 10% bolus 250 mL 250 mL  25 g Intravenous PRN Erasmo Parrish DNP, FNP        famotidine tablet 20 mg  20 mg Oral Daily Lucy Brand MD   20 mg at 04/25/24 1220    gabapentin capsule 100 mg  100 mg Oral BID Javed Vivas MD   100 mg at 04/25/24 1220    glucagon (human recombinant) injection 1 mg  1 mg Intramuscular PRN Erasmo Parrish DNP, FNP        glucose chewable tablet 16 g  16 g Oral PRN Erasmo Parrish DNP, FNP        glucose chewable tablet 24 g  24 g Oral PRN Erasmo Parrish DNP, FNP        hydrALAZINE tablet 75 mg  75 mg Oral Q8H Lucy Brand MD   75 mg at 04/25/24 1345    insulin aspart U-100 pen 0-5 Units  0-5 Units Subcutaneous QID (AC + HS) PRN Erasmo Parrish DNP, FNP        LIDOcaine-EPINEPHrine 1%-1:100,000 injection 1 mL  1 mL Epidural Once Ac, Pavankumar, MD        magnesium oxide tablet 400 mg  400 mg Oral Daily Lucy Brand MD   400 mg at 04/25/24 1220    mupirocin  2 % ointment   Nasal BID Brayan Ocampo MD   Given at 04/25/24 1226    senna-docusate 8.6-50 mg per tablet 2 tablet  2 tablet Oral Daily PRN Lucy Brand MD        sodium bicarbonate tablet 325 mg  325 mg Oral TID Lucy Brand MD   325 mg at 04/25/24 1507    trazodone split tablet 25 mg  25 mg Oral QHS Lucy Brand MD   25 mg at 04/24/24 2048    venlafaxine tablet 37.5 mg  37.5 mg Oral Daily Lucy Brand MD   37.5 mg at 04/25/24 1220       Objective:     Vital Signs (Most Recent):  Temp: 97.9 °F (36.6 °C) (04/25/24 1640)  Pulse: 81 (04/25/24 1640)  Resp: 18 (04/25/24 1640)  BP: 109/84 (04/25/24 1640)  SpO2: 98 % (04/25/24 0745) Vital Signs (24h Range):  Temp:  [97 °F (36.1 °C)-98.2 °F (36.8 °C)] 97.9 °F (36.6 °C)  Pulse:  [] 81  Resp:  [16-18] 18  SpO2:  [97 %-98 %] 98 %  BP: ()/(0-84) 109/84     Weight:  (he was havein disease) (04/25/24 1010)  Body mass index is 22.22 kg/m².  Body surface area is 2.02 meters squared.    I/O last 3 completed shifts:  In: 2244.5 [P.O.:2244.5]  Out: 925 [Urine:925]     Physical Exam  Constitutional:       Appearance: Normal appearance.   Pulmonary:      Effort: Pulmonary effort is normal. No respiratory distress.   Musculoskeletal:      Right lower leg: No edema.      Left lower leg: No edema.   Neurological:      Mental Status: He is alert.          Significant Labs:  BMP:   Recent Labs   Lab 04/25/24  0432   GLU 97      K 3.9      CO2 23   BUN 55*   CREATININE 5.3*   CALCIUM 8.5*   MG 2.1     CBC:   Recent Labs   Lab 04/25/24  0432   WBC 4.31   RBC 2.47*   HGB 7.1*   HCT 21.4*      MCV 87   MCH 28.7   MCHC 33.2        Significant Imaging:  Labs: Reviewed

## 2024-04-26 PROBLEM — I50.43 ACUTE ON CHRONIC COMBINED SYSTOLIC AND DIASTOLIC HEART FAILURE: Status: ACTIVE | Noted: 2023-10-07

## 2024-04-26 PROBLEM — D64.9 ANEMIA: Status: ACTIVE | Noted: 2024-04-26

## 2024-04-26 LAB
ALBUMIN SERPL BCP-MCNC: 2.4 G/DL (ref 3.5–5.2)
ALLENS TEST: ABNORMAL
ALP SERPL-CCNC: 180 U/L (ref 55–135)
ALT SERPL W/O P-5'-P-CCNC: 135 U/L (ref 10–44)
ANION GAP SERPL CALC-SCNC: 9 MMOL/L (ref 8–16)
APTT PPP: 45.8 SEC (ref 21–32)
AST SERPL-CCNC: 73 U/L (ref 10–40)
BASOPHILS # BLD AUTO: 0.02 K/UL (ref 0–0.2)
BASOPHILS # BLD AUTO: 0.02 K/UL (ref 0–0.2)
BASOPHILS NFR BLD: 0.4 % (ref 0–1.9)
BASOPHILS NFR BLD: 0.4 % (ref 0–1.9)
BILIRUB DIRECT SERPL-MCNC: 0.1 MG/DL (ref 0.1–0.3)
BILIRUB SERPL-MCNC: 0.2 MG/DL (ref 0.1–1)
BNP SERPL-MCNC: 1562 PG/ML (ref 0–99)
BUN SERPL-MCNC: 27 MG/DL (ref 8–23)
CALCIUM SERPL-MCNC: 8.2 MG/DL (ref 8.7–10.5)
CHLORIDE SERPL-SCNC: 105 MMOL/L (ref 95–110)
CO2 SERPL-SCNC: 24 MMOL/L (ref 23–29)
CREAT SERPL-MCNC: 3.2 MG/DL (ref 0.5–1.4)
CRP SERPL-MCNC: 29 MG/L (ref 0–8.2)
DIFFERENTIAL METHOD BLD: ABNORMAL
DIFFERENTIAL METHOD BLD: ABNORMAL
EOSINOPHIL # BLD AUTO: 0.2 K/UL (ref 0–0.5)
EOSINOPHIL # BLD AUTO: 0.2 K/UL (ref 0–0.5)
EOSINOPHIL NFR BLD: 3.7 % (ref 0–8)
EOSINOPHIL NFR BLD: 4.5 % (ref 0–8)
ERYTHROCYTE [DISTWIDTH] IN BLOOD BY AUTOMATED COUNT: 17.9 % (ref 11.5–14.5)
ERYTHROCYTE [DISTWIDTH] IN BLOOD BY AUTOMATED COUNT: 17.9 % (ref 11.5–14.5)
EST. GFR  (NO RACE VARIABLE): 21.1 ML/MIN/1.73 M^2
GLUCOSE SERPL-MCNC: 103 MG/DL (ref 70–110)
HCO3 UR-SCNC: 26.5 MMOL/L (ref 24–28)
HCT VFR BLD AUTO: 21.8 % (ref 40–54)
HCT VFR BLD AUTO: 24.1 % (ref 40–54)
HGB BLD-MCNC: 6.7 G/DL (ref 14–18)
HGB BLD-MCNC: 7.7 G/DL (ref 14–18)
IMM GRANULOCYTES # BLD AUTO: 0.07 K/UL (ref 0–0.04)
IMM GRANULOCYTES # BLD AUTO: 0.08 K/UL (ref 0–0.04)
IMM GRANULOCYTES NFR BLD AUTO: 1.5 % (ref 0–0.5)
IMM GRANULOCYTES NFR BLD AUTO: 1.5 % (ref 0–0.5)
INR PPP: 2.4 (ref 0.8–1.2)
LDH SERPL L TO P-CCNC: 287 U/L (ref 110–260)
LYMPHOCYTES # BLD AUTO: 0.7 K/UL (ref 1–4.8)
LYMPHOCYTES # BLD AUTO: 0.8 K/UL (ref 1–4.8)
LYMPHOCYTES NFR BLD: 13.4 % (ref 18–48)
LYMPHOCYTES NFR BLD: 18.1 % (ref 18–48)
MAGNESIUM SERPL-MCNC: 1.8 MG/DL (ref 1.6–2.6)
MAGNESIUM SERPL-MCNC: 2.1 MG/DL (ref 1.6–2.6)
MCH RBC QN AUTO: 27.3 PG (ref 27–31)
MCH RBC QN AUTO: 28.5 PG (ref 27–31)
MCHC RBC AUTO-ENTMCNC: 30.7 G/DL (ref 32–36)
MCHC RBC AUTO-ENTMCNC: 32 G/DL (ref 32–36)
MCV RBC AUTO: 89 FL (ref 82–98)
MCV RBC AUTO: 89 FL (ref 82–98)
MONOCYTES # BLD AUTO: 0.5 K/UL (ref 0.3–1)
MONOCYTES # BLD AUTO: 0.5 K/UL (ref 0.3–1)
MONOCYTES NFR BLD: 10.3 % (ref 4–15)
MONOCYTES NFR BLD: 9.9 % (ref 4–15)
NEUTROPHILS # BLD AUTO: 3.1 K/UL (ref 1.8–7.7)
NEUTROPHILS # BLD AUTO: 3.8 K/UL (ref 1.8–7.7)
NEUTROPHILS NFR BLD: 66 % (ref 38–73)
NEUTROPHILS NFR BLD: 70.3 % (ref 38–73)
NRBC BLD-RTO: 0 /100 WBC
NRBC BLD-RTO: 0 /100 WBC
PCO2 BLDA: 44.2 MMHG (ref 35–45)
PH SMN: 7.39 [PH] (ref 7.35–7.45)
PHOSPHATE SERPL-MCNC: 2.8 MG/DL (ref 2.7–4.5)
PLATELET # BLD AUTO: 305 K/UL (ref 150–450)
PLATELET # BLD AUTO: 319 K/UL (ref 150–450)
PMV BLD AUTO: 10.2 FL (ref 9.2–12.9)
PMV BLD AUTO: 9.7 FL (ref 9.2–12.9)
PO2 BLDA: 31 MMHG (ref 40–60)
POC BE: 2 MMOL/L
POC SATURATED O2: 58 % (ref 95–100)
POC TCO2: 28 MMOL/L (ref 24–29)
POCT GLUCOSE: 158 MG/DL (ref 70–110)
POCT GLUCOSE: 180 MG/DL (ref 70–110)
POCT GLUCOSE: 193 MG/DL (ref 70–110)
POCT GLUCOSE: 222 MG/DL (ref 70–110)
POTASSIUM SERPL-SCNC: 3.8 MMOL/L (ref 3.5–5.1)
POTASSIUM SERPL-SCNC: 3.9 MMOL/L (ref 3.5–5.1)
PREALB SERPL-MCNC: 30 MG/DL (ref 20–43)
PROT SERPL-MCNC: 6.5 G/DL (ref 6–8.4)
PROTHROMBIN TIME: 24.8 SEC (ref 9–12.5)
RBC # BLD AUTO: 2.45 M/UL (ref 4.6–6.2)
RBC # BLD AUTO: 2.7 M/UL (ref 4.6–6.2)
SAMPLE: ABNORMAL
SITE: ABNORMAL
SODIUM SERPL-SCNC: 138 MMOL/L (ref 136–145)
WBC # BLD AUTO: 4.65 K/UL (ref 3.9–12.7)
WBC # BLD AUTO: 5.38 K/UL (ref 3.9–12.7)

## 2024-04-26 PROCEDURE — 83880 ASSAY OF NATRIURETIC PEPTIDE: CPT | Performed by: STUDENT IN AN ORGANIZED HEALTH CARE EDUCATION/TRAINING PROGRAM

## 2024-04-26 PROCEDURE — 84134 ASSAY OF PREALBUMIN: CPT | Performed by: STUDENT IN AN ORGANIZED HEALTH CARE EDUCATION/TRAINING PROGRAM

## 2024-04-26 PROCEDURE — 83615 LACTATE (LD) (LDH) ENZYME: CPT | Performed by: STUDENT IN AN ORGANIZED HEALTH CARE EDUCATION/TRAINING PROGRAM

## 2024-04-26 PROCEDURE — 63600175 PHARM REV CODE 636 W HCPCS: Performed by: INTERNAL MEDICINE

## 2024-04-26 PROCEDURE — 97530 THERAPEUTIC ACTIVITIES: CPT

## 2024-04-26 PROCEDURE — 27000248 HC VAD-ADDITIONAL DAY

## 2024-04-26 PROCEDURE — 85025 COMPLETE CBC W/AUTO DIFF WBC: CPT | Performed by: STUDENT IN AN ORGANIZED HEALTH CARE EDUCATION/TRAINING PROGRAM

## 2024-04-26 PROCEDURE — 25000003 PHARM REV CODE 250: Performed by: STUDENT IN AN ORGANIZED HEALTH CARE EDUCATION/TRAINING PROGRAM

## 2024-04-26 PROCEDURE — 20600001 HC STEP DOWN PRIVATE ROOM

## 2024-04-26 PROCEDURE — 25000003 PHARM REV CODE 250: Performed by: INTERNAL MEDICINE

## 2024-04-26 PROCEDURE — 85610 PROTHROMBIN TIME: CPT | Performed by: STUDENT IN AN ORGANIZED HEALTH CARE EDUCATION/TRAINING PROGRAM

## 2024-04-26 PROCEDURE — 99233 SBSQ HOSP IP/OBS HIGH 50: CPT | Mod: ,,, | Performed by: INTERNAL MEDICINE

## 2024-04-26 PROCEDURE — 84132 ASSAY OF SERUM POTASSIUM: CPT | Performed by: STUDENT IN AN ORGANIZED HEALTH CARE EDUCATION/TRAINING PROGRAM

## 2024-04-26 PROCEDURE — 85730 THROMBOPLASTIN TIME PARTIAL: CPT | Performed by: STUDENT IN AN ORGANIZED HEALTH CARE EDUCATION/TRAINING PROGRAM

## 2024-04-26 PROCEDURE — 80076 HEPATIC FUNCTION PANEL: CPT | Performed by: STUDENT IN AN ORGANIZED HEALTH CARE EDUCATION/TRAINING PROGRAM

## 2024-04-26 PROCEDURE — 93750 INTERROGATION VAD IN PERSON: CPT | Mod: ,,, | Performed by: INTERNAL MEDICINE

## 2024-04-26 PROCEDURE — 80048 BASIC METABOLIC PNL TOTAL CA: CPT | Performed by: STUDENT IN AN ORGANIZED HEALTH CARE EDUCATION/TRAINING PROGRAM

## 2024-04-26 PROCEDURE — 99233 SBSQ HOSP IP/OBS HIGH 50: CPT | Mod: FS,,, | Performed by: HOSPITALIST

## 2024-04-26 PROCEDURE — 99900035 HC TECH TIME PER 15 MIN (STAT)

## 2024-04-26 PROCEDURE — 84100 ASSAY OF PHOSPHORUS: CPT | Performed by: STUDENT IN AN ORGANIZED HEALTH CARE EDUCATION/TRAINING PROGRAM

## 2024-04-26 PROCEDURE — 63600175 PHARM REV CODE 636 W HCPCS: Performed by: STUDENT IN AN ORGANIZED HEALTH CARE EDUCATION/TRAINING PROGRAM

## 2024-04-26 PROCEDURE — 27000207 HC ISOLATION

## 2024-04-26 PROCEDURE — 85025 COMPLETE CBC W/AUTO DIFF WBC: CPT | Mod: 91 | Performed by: STUDENT IN AN ORGANIZED HEALTH CARE EDUCATION/TRAINING PROGRAM

## 2024-04-26 PROCEDURE — 83735 ASSAY OF MAGNESIUM: CPT | Performed by: STUDENT IN AN ORGANIZED HEALTH CARE EDUCATION/TRAINING PROGRAM

## 2024-04-26 PROCEDURE — 83735 ASSAY OF MAGNESIUM: CPT | Mod: 91 | Performed by: STUDENT IN AN ORGANIZED HEALTH CARE EDUCATION/TRAINING PROGRAM

## 2024-04-26 PROCEDURE — 86140 C-REACTIVE PROTEIN: CPT | Performed by: STUDENT IN AN ORGANIZED HEALTH CARE EDUCATION/TRAINING PROGRAM

## 2024-04-26 RX ORDER — POTASSIUM CHLORIDE 750 MG/1
10 CAPSULE, EXTENDED RELEASE ORAL ONCE
Status: COMPLETED | OUTPATIENT
Start: 2024-04-26 | End: 2024-04-26

## 2024-04-26 RX ORDER — FUROSEMIDE 10 MG/ML
80 INJECTION INTRAMUSCULAR; INTRAVENOUS ONCE
Status: COMPLETED | OUTPATIENT
Start: 2024-04-26 | End: 2024-04-26

## 2024-04-26 RX ORDER — WARFARIN 2 MG/1
2 TABLET ORAL DAILY
Status: DISCONTINUED | OUTPATIENT
Start: 2024-04-26 | End: 2024-04-29

## 2024-04-26 RX ORDER — MAGNESIUM SULFATE 1 G/100ML
1 INJECTION INTRAVENOUS ONCE
Status: COMPLETED | OUTPATIENT
Start: 2024-04-26 | End: 2024-04-26

## 2024-04-26 RX ORDER — POTASSIUM CHLORIDE 20 MEQ/1
20 TABLET, EXTENDED RELEASE ORAL ONCE
Status: COMPLETED | OUTPATIENT
Start: 2024-04-26 | End: 2024-04-26

## 2024-04-26 RX ADMIN — GABAPENTIN 100 MG: 100 CAPSULE ORAL at 10:04

## 2024-04-26 RX ADMIN — FUROSEMIDE 80 MG: 10 INJECTION, SOLUTION INTRAVENOUS at 09:04

## 2024-04-26 RX ADMIN — GABAPENTIN 100 MG: 100 CAPSULE ORAL at 08:04

## 2024-04-26 RX ADMIN — POTASSIUM CHLORIDE 10 MEQ: 10 CAPSULE, COATED, EXTENDED RELEASE ORAL at 08:04

## 2024-04-26 RX ADMIN — WARFARIN SODIUM 2 MG: 2 TABLET ORAL at 05:04

## 2024-04-26 RX ADMIN — AMIODARONE HYDROCHLORIDE 400 MG: 200 TABLET ORAL at 08:04

## 2024-04-26 RX ADMIN — SODIUM BICARBONATE 325 MG: 325 TABLET ORAL at 08:04

## 2024-04-26 RX ADMIN — SODIUM BICARBONATE 325 MG: 325 TABLET ORAL at 10:04

## 2024-04-26 RX ADMIN — MAGNESIUM SULFATE HEPTAHYDRATE 1 G: 500 INJECTION, SOLUTION INTRAMUSCULAR; INTRAVENOUS at 08:04

## 2024-04-26 RX ADMIN — FUROSEMIDE 80 MG: 10 INJECTION, SOLUTION INTRAVENOUS at 07:04

## 2024-04-26 RX ADMIN — FAMOTIDINE 20 MG: 20 TABLET ORAL at 08:04

## 2024-04-26 RX ADMIN — AMPICILLIN 2 G: 2 INJECTION, POWDER, FOR SOLUTION INTRAMUSCULAR; INTRAVENOUS at 09:04

## 2024-04-26 RX ADMIN — AMPICILLIN 2 G: 2 INJECTION, POWDER, FOR SOLUTION INTRAMUSCULAR; INTRAVENOUS at 10:04

## 2024-04-26 RX ADMIN — CEFTRIAXONE 2 G: 2 INJECTION, POWDER, FOR SOLUTION INTRAMUSCULAR; INTRAVENOUS at 11:04

## 2024-04-26 RX ADMIN — SODIUM BICARBONATE 325 MG: 325 TABLET ORAL at 02:04

## 2024-04-26 RX ADMIN — Medication 400 MG: at 08:04

## 2024-04-26 RX ADMIN — HYDRALAZINE HYDROCHLORIDE 75 MG: 50 TABLET ORAL at 10:04

## 2024-04-26 RX ADMIN — CEFTRIAXONE 2 G: 2 INJECTION, POWDER, FOR SOLUTION INTRAMUSCULAR; INTRAVENOUS at 10:04

## 2024-04-26 RX ADMIN — VENLAFAXINE 37.5 MG: 37.5 TABLET ORAL at 08:04

## 2024-04-26 RX ADMIN — POTASSIUM CHLORIDE 20 MEQ: 1500 TABLET, EXTENDED RELEASE ORAL at 05:04

## 2024-04-26 RX ADMIN — HYDRALAZINE HYDROCHLORIDE 75 MG: 50 TABLET ORAL at 02:04

## 2024-04-26 RX ADMIN — HYDRALAZINE HYDROCHLORIDE 75 MG: 50 TABLET ORAL at 05:04

## 2024-04-26 RX ADMIN — TRAZODONE HYDROCHLORIDE 25 MG: 50 TABLET ORAL at 10:04

## 2024-04-26 RX ADMIN — MUPIROCIN: 20 OINTMENT TOPICAL at 08:04

## 2024-04-26 NOTE — NURSING
Called lab about Hgb result and they said they received sample at 1427, but for some reason, they can't find sample on the machine, notified Ac.MD, stat CBC ordered. Hgb 7.7, made team aware.

## 2024-04-26 NOTE — PROGRESS NOTES
[facility]  Nephrology  Progress Note    Patient Name: Radha Abbott  MRN: [id number]  Admission Date: 4/16/2024  Hospital Length of Stay: 10 days  Attending Provider: Brayan Ocampo MD   Primary Care Physician: Vasu Kong MD  Principal Problem:Pneumonia due to COVID-19 virus    Subjective:     HPI:  63 yo male with stage D HFrEF, ICMP with previous admission for ADHF/CS. S/p LVAD placement 12/2023 Lengthy post op course complicated by vasoplegia(requiring Giaprezza), debility, malnutrition/impaired swallowing, and renal failure requiring HD(since weaned off). presents to the ED for infectious workup due to complaints of fevers of 101.5F. Patient receiving abx, for presumed endocarditis/bactermia. Hospital course complicated by covid positive and oliguric IVÁN.     Concerning renal hx, patient following with [facility] nephrology Dr. Mendez in nephrology clinic. Baseline Scr ~4 with CKD IV/V, poor reserve.     Nephrology consulted for IVÁN    Interval History:   HD completed yesterday, tolerated well with adequate metabolic clearance.  Reports dark stools on exam this morning noting a decrease in hgb to 6.7//21.8.  UOP of 625 ml/24h.      Review of patient's allergies indicates:  No Known Allergies  Current Facility-Administered Medications   Medication Dose Route Frequency Provider Last Rate Last Admin    acetaminophen tablet 1,000 mg  1,000 mg Oral Q8H PRN Lucy Brand MD   1,000 mg at 04/25/24 2034    amiodarone tablet 400 mg  400 mg Oral Daily Lucy Brand MD   400 mg at 04/26/24 0850    ampicillin (OMNIPEN) 2 g in sodium chloride 0.9 % 100 mL IVPB (MB+)  2 g Intravenous Q12H NneduAxel MD   Stopped at 04/26/24 1055    cefTRIAXone (ROCEPHIN) 2 g in dextrose 5 % in water (D5W) 100 mL IVPB (MB+)  2 g Intravenous Q12H Nnedu, Axel LUNA  mL/hr at 04/26/24 1058 2 g at 04/26/24 1058    dextrose 10% bolus 125 mL 125 mL  12.5 g Intravenous PRN Erasmo Parrish, DNP, FNP         dextrose 10% bolus 250 mL 250 mL  25 g Intravenous PRN Erasmo Parrish DNP, COREEN        famotidine tablet 20 mg  20 mg Oral Daily Lucy Brand MD   20 mg at 04/26/24 0850    gabapentin capsule 100 mg  100 mg Oral BID Javed Vivas MD   100 mg at 04/26/24 0849    glucagon (human recombinant) injection 1 mg  1 mg Intramuscular PRN Erasmo Parrish DNP, FNP        glucose chewable tablet 16 g  16 g Oral PRN Erasmo Parrish DNP, FNP        glucose chewable tablet 24 g  24 g Oral PRN Erasmo Parrish DNP, COREEN        hydrALAZINE tablet 75 mg  75 mg Oral Q8H Lucy Brand MD   75 mg at 04/26/24 0521    insulin aspart U-100 pen 0-5 Units  0-5 Units Subcutaneous QID (AC + HS) PRN Erasmo Parrish DNP, FNP        LIDOcaine-EPINEPHrine 1%-1:100,000 injection 1 mL  1 mL Epidural Once Angela Shen MD        magnesium oxide tablet 400 mg  400 mg Oral Daily Lucy Brand MD   400 mg at 04/26/24 0849    senna-docusate 8.6-50 mg per tablet 2 tablet  2 tablet Oral Daily PRN Lucy Brand MD        sodium bicarbonate tablet 325 mg  325 mg Oral TID Lucy Brand MD   325 mg at 04/26/24 0849    trazodone split tablet 25 mg  25 mg Oral QHS Lucy Brand MD   25 mg at 04/25/24 2035    venlafaxine tablet 37.5 mg  37.5 mg Oral Daily Lucy Brand MD   37.5 mg at 04/26/24 0850    warfarin (COUMADIN) tablet 2 mg  2 mg Oral Daily Brayan Ocampo MD           Objective:     Vital Signs (Most Recent):  Temp: 98.1 °F (36.7 °C) (04/26/24 0755)  Pulse: 84 (04/26/24 1106)  Resp: 18 (04/26/24 0755)  BP: 104/63 (04/26/24 0800)  SpO2: 98 % (04/26/24 0755) Vital Signs (24h Range):  Temp:  [97.4 °F (36.3 °C)-98.1 °F (36.7 °C)] 98.1 °F (36.7 °C)  Pulse:  [80-99] 84  Resp:  [17-18] 18  SpO2:  [94 %-99 %] 98 %  BP: ()/(0-84) 104/63     Weight: 79.8 kg (175 lb 14.8 oz) (04/26/24 0430)  Body mass index is 22.59 kg/m².  Body surface area is 2.04 meters squared.    I/O  last 3 completed shifts:  In: 1070.5 [P.O.:1070.5]  Out: 1225 [Urine:625; Other:600]     Physical Exam  Vitals and nursing note reviewed.   Constitutional:       General: He is not in acute distress.     Appearance: Normal appearance. He is not ill-appearing or toxic-appearing.   HENT:      Head: Normocephalic and atraumatic.   Eyes:      General: No scleral icterus.        Right eye: No discharge.         Left eye: No discharge.      Extraocular Movements: Extraocular movements intact.      Conjunctiva/sclera: Conjunctivae normal.   Cardiovascular:      Comments: LVAD Hum  Pulmonary:      Effort: Pulmonary effort is normal. No respiratory distress.      Breath sounds: No wheezing or rales.   Abdominal:      General: There is no distension.      Palpations: Abdomen is soft.      Tenderness: There is no abdominal tenderness.   Musculoskeletal:      Cervical back: Normal range of motion and neck supple.      Right lower leg: No edema.      Left lower leg: No edema.   Neurological:      General: No focal deficit present.      Mental Status: He is alert and oriented to person, place, and time.   Psychiatric:         Mood and Affect: Mood normal.         Behavior: Behavior normal.          Significant Labs:  CBC:   Recent Labs   Lab 04/26/24  0400   WBC 4.65   RBC 2.45*   HGB 6.7*   HCT 21.8*      MCV 89   MCH 27.3   MCHC 30.7*     CMP:   Recent Labs   Lab 04/26/24  0400      CALCIUM 8.2*   ALBUMIN 2.4*   PROT 6.5      K 3.9   CO2 24      BUN 27*   CREATININE 3.2*   ALKPHOS 180*   *   AST 73*   BILITOT 0.2        Assessment/Plan:     Pulmonary  * Pneumonia due to COVID-19 virus  Mgmt per primary team    Renal/  IVÁN (acute kidney injury)  Anuric IVÁN on baseline CKD IV  Baseline Scr ~4; Scr at time of consult ~8.3  UA Trace protein; bland otherwise.   UPCR 0.61g  Renal US: not avaialbe. In 2023 showed MRD.  Suspect ATN given rapid increase in Scr and anuria, now making urine.  Patient  with prior CKD V poor reserve. Chart reviewed in depth, no inciting cause of IVÁN (normal vanc troughs, no obvious nephrotoxins, normal CPK). Suspect insult occurred in setting of COVID/bacteremia.     Plan;  -will plan to hold RRT over the weekend unless emergent needs arise.  Will make a decision on Monday if he will need OP dialysis and TDC placement.  -Recommend starting on Bumex 4 mg PO daily  - renally dose all medications  - avoid nephrotoxins as much as possible.  -recommend PRBC tranfusion;  workup for melena     ID  Bacteremia  COVID positive  E. Fecalis bacteremia  Per primary team.       Norm Vidal, NP  Nephrology  [facility] - [facility]

## 2024-04-26 NOTE — PLAN OF CARE
Problem: Occupational Therapy  Goal: Occupational Therapy Goal  Description: Goals to be met by: 5/17/24     Patient will increase functional independence with ADLs by performing:    UE Dressing with Modified [place].  LE Dressing with Modified [place].  Grooming while standing at sink with Modified [place].  Toileting from toilet/bedside commode with Modified [place] for hygiene and clothing management.   Toilet transfer to toilet/bedside commode with Modified [place].    Outcome: Met

## 2024-04-26 NOTE — PROGRESS NOTES
[facility] - Cardiology Stepdown  Heart Transplant  Progress Note    Patient Name: Radha Abbott  MRN: [id number]  Admission Date: 4/16/2024  Hospital Length of Stay: 10 days  Attending Physician: Brayan Ocampo MD  Primary Care Provider: Vasu Kong MD  Principal Problem:Pneumonia due to COVID-19 virus    Subjective:   Interval History:   Overnight patient had some oozing from his trialysis line but stopped after quickclot. No acute complaints.     Continuous Infusions:  Current Facility-Administered Medications   Medication Dose Route Frequency Last Rate Last Admin     Scheduled Meds:  Current Facility-Administered Medications   Medication Dose Route Frequency    amiodarone  400 mg Oral Daily    ampicillin IV (PEDS and ADULTS)  2 g Intravenous Q12H    cefTRIAXone (Rocephin) IV (PEDS and ADULTS)  2 g Intravenous Q12H    famotidine  20 mg Oral Daily    gabapentin  100 mg Oral BID    hydrALAZINE  75 mg Oral Q8H    LIDOcaine-EPINEPHrine 1%-1:100,000  1 mL Epidural Once    magnesium oxide  400 mg Oral Daily    sodium bicarbonate  325 mg Oral TID    traZODone  25 mg Oral QHS    venlafaxine  37.5 mg Oral Daily    warfarin  2 mg Oral Daily     PRN Meds:  Current Facility-Administered Medications:     acetaminophen, 1,000 mg, Oral, Q8H PRN    dextrose 10%, 12.5 g, Intravenous, PRN    dextrose 10%, 25 g, Intravenous, PRN    glucagon (human recombinant), 1 mg, Intramuscular, PRN    glucose, 16 g, Oral, PRN    glucose, 24 g, Oral, PRN    insulin aspart U-100, 0-5 Units, Subcutaneous, QID (AC + HS) PRN    senna-docusate 8.6-50 mg, 2 tablet, Oral, Daily PRN    Review of patient's allergies indicates:  No Known Allergies  Objective:     Vital Signs (Most Recent):  Temp: 98.1 °F (36.7 °C) (04/26/24 0755)  Pulse: 85 (04/26/24 1018)  Resp: 18 (04/26/24 0755)  BP: 104/63 (04/26/24 0800)  SpO2: 98 % (04/26/24 0755) Vital Signs (24h Range):  Temp:  [97.4 °F (36.3 °C)-98.1 °F (36.7 °C)] 98.1 °F (36.7 °C)  Pulse:  [80-99] 85  Resp:   [17-18] 18  SpO2:  [94 %-99 %] 98 %  BP: ()/(0-84) 104/63     Patient Vitals for the past 72 hrs (Last 3 readings):   Weight   04/26/24 0430 79.8 kg (175 lb 14.8 oz)   04/25/24 0455 78.5 kg (173 lb 1 oz)   04/24/24 0509 77.4 kg (170 lb 10.2 oz)     Body mass index is 22.59 kg/m².      Intake/Output Summary (Last 24 hours) at 4/26/2024 1019  Last data filed at 4/26/2024 1011  Gross per 24 hour   Intake 1192 ml   Output 1425 ml   Net -233 ml       Hemodynamic Parameters:  CVP:  [5 mmHg] 5 mmHg    Telemetry: SR with PVC's       Physical Exam  Constitutional:       Appearance: He is ill-appearing.   HENT:      Head: Normocephalic and atraumatic.   Eyes:      Conjunctiva/sclera: Conjunctivae normal.      Pupils: Pupils are equal, round, and reactive to light.   Neck:      Comments: JVP 15cm of H20.   Cardiovascular:      Rate and Rhythm: Normal rate and regular rhythm.      Comments: Smooth VAD hum  Pulmonary:      Effort: Pulmonary effort is normal.      Comments: Clear to auscultation  Abdominal:      General: Bowel sounds are normal.      Palpations: Abdomen is soft.   Musculoskeletal:         General: No swelling. Normal range of motion.      Cervical back: Normal range of motion and neck supple.   Skin:     General: Skin is warm and dry.      Capillary Refill: Capillary refill takes 2 to 3 seconds.   Neurological:      General: No focal deficit present.      Mental Status: He is alert and oriented to person, place, and time.            Significant Labs:  CBC:  Recent Labs   Lab 04/24/24 0425 04/25/24  0432 04/26/24  0400   WBC 4.30 4.31 4.65   RBC 2.83* 2.47* 2.45*   HGB 7.9* 7.1* 6.7*   HCT 24.0* 21.4* 21.8*    313 305   MCV 85 87 89   MCH 27.9 28.7 27.3   MCHC 32.9 33.2 30.7*     BNP:  Recent Labs   Lab 04/22/24  0530 04/24/24  0425 04/26/24  0400   BNP 1,116* 1,263* 1,562*     CMP:  Recent Labs   Lab 04/22/24  0530 04/23/24  0422 04/24/24  0425 04/25/24  0432 04/26/24  0400      < > 95 97  103   CALCIUM 8.5*   < > 8.6* 8.5* 8.2*   ALBUMIN 2.5*  --  2.5*  --  2.4*   PROT 6.9  --  7.2  --  6.5   *   < > 135* 137 138   K 3.5   < > 3.9 3.9 3.9   CO2 21*   < > 22* 23 24   CL 91*   < > 101 103 105   *   < > 57* 55* 27*   CREATININE 8.6*   < > 5.6* 5.3* 3.2*   ALKPHOS 154*  --  201*  --  180*   *  --  186*  --  135*   *  --  140*  --  73*   BILITOT 0.2  --  0.2  --  0.2    < > = values in this interval not displayed.      Coagulation:   Recent Labs   Lab 04/24/24 0425 04/25/24  0432 04/26/24  0400   INR 3.7* 2.9* 2.4*   APTT 60.0* 53.6* 45.8*     LDH:  Recent Labs   Lab 04/24/24  0425 04/25/24  0432 04/26/24  0400   * 298* 287*     Microbiology:  Microbiology Results (last 7 days)       Procedure Component Value Units Date/Time    Blood culture [[id number]] Collected: 04/20/24 0234    Order Status: Completed Specimen: Blood Updated: 04/25/24 0612     Blood Culture, Routine No growth after 5 days.    Blood culture [[id number]] Collected: 04/20/24 0240    Order Status: Completed Specimen: Blood Updated: 04/25/24 0612     Blood Culture, Routine No growth after 5 days.    Blood culture [[id number]] Collected: 04/18/24 0534    Order Status: Completed Specimen: Blood Updated: 04/23/24 1012     Blood Culture, Routine No growth after 5 days.    Blood culture [[id number]] Collected: 04/18/24 0535    Order Status: Completed Specimen: Blood Updated: 04/23/24 1012     Blood Culture, Routine No growth after 5 days.    Blood culture x two cultures. Draw prior to antibiotics. [[id number]]  (Abnormal) Collected: 04/16/24 2322    Order Status: Completed Specimen: Blood from Peripheral, Hand, Right Updated: 04/19/24 1501     Blood Culture, Routine Gram stain aer bottle: Gram positive cocci      Gram stain nata bottle: Gram positive cocci      Positive results previously called 04/17/2024 15:08      ENTEROCOCCUS FAECALIS  For susceptibility see order #[id number]      Narrative:      Aerobic  and anaerobic    Blood culture x two cultures. Draw prior to antibiotics. [[id number]]  (Abnormal)  (Susceptibility) Collected: 04/16/24 0399    Order Status: Completed Specimen: Blood from Peripheral, Hand, Left Updated: 04/19/24 1500     Blood Culture, Routine Gram stain aer bottle: Gram positive cocci      Gram stain nata bottle: Gram positive cocci      Results called to and read back by: Rachael Horan RN 04/17/2024  15:08      ENTEROCOCCUS FAECALIS    Narrative:      Aerobic and anaerobic            I have reviewed all pertinent labs within the past 24 hours.    Estimated Creatinine Clearance: 27 mL/min (A) (based on SCr of 3.2 mg/dL (H)).    Diagnostic Results:  I have reviewed and interpreted all pertinent imaging results/findings within the past 24 hours.  Assessment and Plan:     Mr. Radha Abbott is a very pleasant 63 yo male with stage D HFrEF, ICMP with previous admission for ADHF/CS. He did undergo HM3 placement placed by Dr Washington on 12/1/23. Lengthy post op course complicated by vasoplegia(requiring Giaprezza), debility, malnutrition/impaired swallowing, and renal failure requiring HD(since weaned off). Once medically stable, he was transferred to Rehab for further PT/OT/ST. Of note, pt left with permacath still in place. He has done really well in rehab and now has continued to progress well at home. He is on a very high dose of Bumex 4 mg twice daily. VAD speed is at 5000 rpm. No VAD alarms noted on interrogation occasional PI events . Today he presents to the ED for infectious workup due to complaints of fevers of 101.5F associated with fatigue, generalized weakness, lower back pain and HA. He also reports a decline in appetite. Wife changes his driveline dressing every Saturday and has not noticed any purulance with dressing changes. Body aches/weakness started around 2am last night followed by fevers around 5pm. He has been taking tylenol which has helped brng down fevers but has not helped with HA.  HA is 5/10 in intensity. He reports slight increase in body weight/ fluid retention yesterday for which he took a dose of Metolazone with a drop in weight by ~5lbs. Denies any SOB, LE swelling at this time.      2D Echo with CFD done on 3/26/2024  LVAD: There is a Heartmate III LVAD running at 5000 RPM. The aortic valve does not open. The ventricular septum is at midline. Inflow cannula well seated at the apex. Outflow graft not visualized.    Left Ventricle: The left ventricle is moderately dilated. Normal wall thickness. Global hypokinesis present. Septal motion is abnormal. There is severely reduced systolic function with a visually estimated ejection fraction of 5 - 10%. There is diastolic dysfunction but grade cannot be determined.    Right Ventricle: Mild right ventricular enlargement. Wall thickness is normal. Right ventricle wall motion  is normal. Systolic function is normal. Pacemaker lead present in the ventricle.    Left Atrium: Left atrium is severely dilated.    Right Atrium: Right atrium is moderately dilated.    Mitral Valve: The mitral valve is repaired with an Noble stitch. There is mild regurgitation.    IVC/SVC: Normal venous pressure at 3 mmHg.    * Pneumonia due to COVID-19 virus  -4/16/24 he presented to the ED for infectious workup due to complaints of fevers of 101.5F associated with fatigue, generalized weakness, lower back pain and HA that started this morning. No evidence of drive line infection. No history of line infection.   - COVID +ve  - CT chest/ abdomen / pelvis 4/16 showed new RML opacity  - S/p remdesavir.   - continue tylenol for fevers    IVÁN (acute kidney injury)  Most likely 2/2 to ATN on CKD IV.      - HD as per nephrology.   - patient starting to produce urine. Creatinine stable.   - Will continue to monitor.  - Nephrology consulted. Appreciate recs.         Bacteremia  -blood cxs from 4/16 +ve for E faecalis   -ID following  -E faecalis is a known cause of endocarditis  and AICD lead infections.   -TTE was negative for vegetation.  However TTE does not rule out valvular or lead vegetation. Will treat this as endocarditis with 6 weeks of IV antibiotics followed by suppressive therapy   -Currently on Ampicillin and Ceftriaxone for 6wk course as per ID.   -Repeat cultures from 4/20 are negative thus far    Anemia  - possibly secondary to dilutional anemia in the setting of hypervolemia.   - Will give IV lasix and recheck cbc.     Hyponatremia  -POA  -Daily labs       Hypertension  Continue hydralazine    Headache  C/o of HA on admit with no improvement despite tylenol  - CTH done 4/16 with no acute processes    LVAD (left ventricular assist device) present  -HeartMate 3 Implanted  12/1/2023  as DT  -Cont Coumadin, dosing by PharmD.  Goal INR 2.0-3.0 . Therapeutic today. Will restart coumadin.   -Antiplatelets Not on ASA due to low H/H on implant   -LDH is stable overall today. Will continue to monitor daily.  -Speed set at  5000     -Interrogation notable for no events  -ECHO 4/18/24 with speed at 5000 EF: 10-15%, LVEDD: 6.1cm, RV size normal function mod reduced   -RHC 1/22/24 with RA 13, PCWP 22, PA 50/25, CO 5.8, CI 3.0   -Not listed for OHTx, declined for OHTX due to comorbidities     Procedure: Device Interrogation Including analysis of device parameters  Current Settings: Ventricular Assist Device  Review of device function is stable/unstable stable        4/26/2024     8:00 AM 4/26/2024     4:02 AM 4/26/2024    12:19 AM 4/25/2024     8:23 PM 4/25/2024     8:22 PM 4/25/2024     3:46 PM 4/25/2024    11:09 AM   TXP LVAD INTERROGATIONS   Type HeartMate3 HeartMate3 HeartMate3 HeartMate3 HeartMate3 HeartMate3 HeartMate3   Flow 3.7 4.1 4.1 4  4.2 4   Speed 5000 5050 5000 5000  5000 5000   PI 5.4 3.8 3.6 4  3.7 3.6   Power ([place]) 3.4 3.3 3.4 3.4  3.6 3.4   LSL 4600 4800 4800 4800  4800 4800   Pulsatility Pulse Intermittent pulse Intermittent pulse Intermittent pulse  Intermittent  pulse Pulse         Type 2 diabetes mellitus without complication, without long-term current use of insulin  -SSI    Acute on chronic combined systolic and diastolic heart failure  -ICM  -Last 2D Echo  4/18/24 : LVEF 10-15%, LVEDD  6.1 cm  -Hypervolemic on examination today. Will give dose of IV lasix 80mg and monitor response.   -GDMT with Hydralazine  -2g Na dietary restriction, 1500 mL fluid restriction, strict I/Os      Ventricular tachycardia  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        Angela Shen MD  Heart Transplant  [facility] - Cardiology Step[facility]

## 2024-04-26 NOTE — NURSING
RIJ saturated with blood. Dr. Segovia notified and informed of occasional oozing of blood. Dr. Segovia instructed this nurse to perform a dressing change and to add quick clots, extra guazes and to hold pressure. Pressure was held for 5 mins. Dr. Segovia also instructed this nurse to call her back if another dressing saturation occurs.     Patient and wife requested to speak with Dr. Segovia at bedside.       @0103- Dr. Segovia visited with patient at bedside.

## 2024-04-26 NOTE — NURSING
Informed Dr. Segovia of patient's doppler being at 100/0. Patient was given scheduled dose of hydralazine. Patient is asymptomatic. Per Dr. Segovia, recheck doppler in 1 hour.     Doppler taken at 0621 is now 90/0- Left arm, in a sitting position.

## 2024-04-26 NOTE — PT/OT/SLP PROGRESS
Occupational Therapy   Treatment/Discharge Summary    Name: Radha Abbott  MRN: [id number]  Admitting Diagnosis:  Pneumonia due to COVID-19 virus       Recommendations:     Discharge Recommendations: Low Intensity Therapy  Discharge Equipment Recommendations:  walker, rolling  Barriers to discharge:  None    Assessment:     Radha Abbott is a 64 y.o. male with a medical diagnosis of Pneumonia due to COVID-19 virus.  He presents with the following performance deficits affecting function are impaired cardiopulmonary response to activity. Pt completed all functional activity and functional mobility with (I) this day without incident. Pt had no complaints of lightheadedness or dizziness and VSS throughout. Per pt and spouse, he has been performing all daily activities, including LVAD management, and functional mobility independently. OT provided HEP with green theraband with pt reporting competence. OT to discharge current orders. Please re-consult it pt's functional status changes warranting skilled acute OT intervention during this hospital admission.     Rehab Prognosis:  Good; patient would benefit from acute skilled OT services to address these deficits and reach maximum level of function.       Plan:     D/C from OT    Subjective     Chief Complaint: COVID restrictions  Patient/Family Comments/goals: to go outside  Pain/Comfort:  Pain Rating 1: 0/10  Pain Rating Post-Intervention 1: 0/10    Objective:     Communicated with: RN prior to session.  Patient found up in chair with peripheral IV, LVAD, telemetry upon OT entry to room.    General Precautions: Standard, fall, LVAD, airborne, contact, droplet    Orthopedic Precautions:N/A  Braces: N/A  Respiratory Status: Room air     Occupational Performance:     Bed Mobility:    Not assessed- pt met/left sitting on bedside chair    Functional Mobility/Transfers:  Patient completed Sit <> Stand Transfer with independence  with  no assistive device   Functional Mobility: Pt  engaged in functional mobility to simulate household/community distances 60 ft in room with (I) using no AD in order to maximize functional endurance and standing balance required for engagement in occupations of choice   No LOB or SOB noted    Activities of Daily Living:  Lower Body Dressing: modified independence to pull pants above hips in standing      [facility] 6 Click ADL: 24    Treatment & Education:  -Education on HEP and green theraband  -Provided education regarding role of OT, POC, & discharge recommendations with pt and spouse verbalizing understanding.  Pt had no further questions & when asked whether there were any concerns pt reported none.     Patient left up in chair with all lines intact, call button in reach, RN notified, and Eduard CARD present    GOALS:   Multidisciplinary Problems       Occupational Therapy Goals       Not on file              Multidisciplinary Problems (Resolved)          Problem: Occupational Therapy    Goal Priority Disciplines Outcome Interventions   Occupational Therapy Goal   (Resolved)     OT, PT/OT Met    Description: Goals to be met by: 5/17/24     Patient will increase functional independence with ADLs by performing:    UE Dressing with Modified [place].  LE Dressing with Modified [place].  Grooming while standing at sink with Modified [place].  Toileting from toilet/bedside commode with Modified [place] for hygiene and clothing management.   Toilet transfer to toilet/bedside commode with Modified [place].                         Time Tracking:     OT Date of Treatment: 04/26/24  OT Start Time: 1130  OT Stop Time: 1149  OT Total Time (min): 19 min    Billable Minutes:Therapeutic Activity 19    OT/PRIETO: OT          4/26/2024

## 2024-04-26 NOTE — CARE UPDATE
Care Update:     No acute events overnight. Patient in room 311/311 A. Blood glucose stable on current inpatient regimen.No hypoglycemia noted over the past 24-hours. Endocrine will continue to follow and manage insulin orders inpatient.       Steroid use- None.   Renal function-   Lab Results   Component Value Date    CREATININE 3.2 (H) 04/26/2024        Diet Renal Coumadin Restriction; Fluid - 1500mL; Isolation Tray - Regular [place]     POCT Glucose   Date Value Ref Range Status   04/26/2024 193 (H) 70 - 110 mg/dL Final   04/25/2024 181 (H) 70 - 110 mg/dL Final   04/25/2024 365 (H) 70 - 110 mg/dL Final   04/25/2024 132 (H) 70 - 110 mg/dL Final   04/25/2024 170 (H) 70 - 110 mg/dL Final   04/24/2024 201 (H) 70 - 110 mg/dL Final   04/24/2024 160 (H) 70 - 110 mg/dL Final   04/24/2024 217 (H) 70 - 110 mg/dL Final   04/24/2024 166 (H) 70 - 110 mg/dL Final   04/24/2024 103 70 - 110 mg/dL Final   04/23/2024 107 70 - 110 mg/dL Final   04/23/2024 207 (H) 70 - 110 mg/dL Final   04/23/2024 167 (H) 70 - 110 mg/dL Final     Lab Results   Component Value Date    HGBA1C 7.4 (H) 04/17/2024     BG excursions noted however resolved without use of SSI thus far. LDC SSI not being given.     Endocrine  Type 2 diabetes mellitus without complication, without long-term current use of insulin  Endocrinology consulted for BG management.   BG goal 140-180     - Novolog (Insulin Aspart) prn for BG excursions LDC SSI (150/50)  - BG checks AC/HS  - Hypoglycemia protocol in place     ** Please notify Endocrine for any change and/or advance in diet**  ** Please call Endocrine for any BG related issues **     Discharge Planning:   TBD. Please notify endocrinology prior to discharge.

## 2024-04-26 NOTE — PLAN OF CARE
K and Mag replaced. VSS. BG monitored. LVAD number and doppler WNL. LVAD dressing CDI. Spouse at bedside. Pt up in chair during the day. Pt educated on fall risk and remained free from falls/trauma/injury. Denies chest pain, SOB, palpitations, dizziness, pain, or discomfort. Plan of care reviewed with pt, all questions answered. Bed locked in lowest position, call bell within reach, no acute distress noted, will continue to monitor.

## 2024-04-26 NOTE — PLAN OF CARE
Problem: Adult Inpatient Plan of Care  Goal: Plan of Care Review  4/26/2024 0500 by Caryl Wilkins RN  Outcome: Progressing  4/26/2024 0500 by Caryl Wilkins RN  Outcome: Not Progressing  Goal: Patient-Specific Goal (Individualized)  4/26/2024 0500 by Caryl Wilkins RN  Outcome: Progressing  4/26/2024 0500 by Caryl Wilkins RN  Outcome: Not Progressing  Goal: Absence of Hospital-Acquired Illness or Injury  4/26/2024 0500 by Caryl Wilkins RN  Outcome: Progressing  4/26/2024 0500 by Caryl Wilkins RN  Outcome: Not Progressing  Goal: Optimal Comfort and Wellbeing  4/26/2024 0500 by Caryl Wilkins RN  Outcome: Progressing  4/26/2024 0500 by Caryl Wilkins RN  Outcome: Not Progressing  Goal: Readiness for Transition of Care  4/26/2024 0500 by Caryl Wilkins RN  Outcome: Progressing  4/26/2024 0500 by Caryl Wilkins RN  Outcome: Not Progressing     Problem: Adjustment to Illness (Sepsis/Septic Shock)  Goal: Optimal Coping  4/26/2024 0500 by Caryl Wilkins RN  Outcome: Progressing  4/26/2024 0500 by Caryl Wilkins RN  Outcome: Not Progressing     Problem: Nutrition Impaired (Sepsis/Septic Shock)  Goal: Optimal Nutrition Intake  4/26/2024 0500 by Caryl Wilkins RN  Outcome: Progressing  4/26/2024 0500 by Caryl Wilkins RN  Outcome: Not Progressing

## 2024-04-26 NOTE — NURSING
Patient's hemoglobin is 6.7. Dr. Segovia notified. Patient asymptomatic, care ongoing no further orders given.

## 2024-04-26 NOTE — PROGRESS NOTES
[facility] - Cardiology Stepdown  Heart Transplant  Progress Note    Patient Name: Radha Abbott  MRN: [id number]  Admission Date: 4/16/2024  Hospital Length of Stay: 10 days  Attending Physician: Brayan Ocampo MD  Primary Care Provider: Vasu Kong MD  Principal Problem:Pneumonia due to COVID-19 virus    Subjective:   Interval History:   Overnight patient had some oozing from his trialysis line but stopped after quickclot. No acute complaints.     Continuous Infusions:  Current Facility-Administered Medications   Medication Dose Route Frequency Last Rate Last Admin     Scheduled Meds:  Current Facility-Administered Medications   Medication Dose Route Frequency    amiodarone  400 mg Oral Daily    ampicillin IV (PEDS and ADULTS)  2 g Intravenous Q12H    cefTRIAXone (Rocephin) IV (PEDS and ADULTS)  2 g Intravenous Q12H    famotidine  20 mg Oral Daily    gabapentin  100 mg Oral BID    hydrALAZINE  75 mg Oral Q8H    LIDOcaine-EPINEPHrine 1%-1:100,000  1 mL Epidural Once    magnesium oxide  400 mg Oral Daily    sodium bicarbonate  325 mg Oral TID    traZODone  25 mg Oral QHS    venlafaxine  37.5 mg Oral Daily    warfarin  2 mg Oral Daily     PRN Meds:  Current Facility-Administered Medications:     acetaminophen, 1,000 mg, Oral, Q8H PRN    dextrose 10%, 12.5 g, Intravenous, PRN    dextrose 10%, 25 g, Intravenous, PRN    glucagon (human recombinant), 1 mg, Intramuscular, PRN    glucose, 16 g, Oral, PRN    glucose, 24 g, Oral, PRN    insulin aspart U-100, 0-5 Units, Subcutaneous, QID (AC + HS) PRN    senna-docusate 8.6-50 mg, 2 tablet, Oral, Daily PRN    Review of patient's allergies indicates:  No Known Allergies  Objective:     Vital Signs (Most Recent):  Temp: 98.1 °F (36.7 °C) (04/26/24 0755)  Pulse: 85 (04/26/24 1018)  Resp: 18 (04/26/24 0755)  BP: 104/63 (04/26/24 0800)  SpO2: 98 % (04/26/24 0755) Vital Signs (24h Range):  Temp:  [97.4 °F (36.3 °C)-98.1 °F (36.7 °C)] 98.1 °F (36.7 °C)  Pulse:  [80-99] 85  Resp:   [17-18] 18  SpO2:  [94 %-99 %] 98 %  BP: ()/(0-84) 104/63     Patient Vitals for the past 72 hrs (Last 3 readings):   Weight   04/26/24 0430 79.8 kg (175 lb 14.8 oz)   04/25/24 0455 78.5 kg (173 lb 1 oz)   04/24/24 0509 77.4 kg (170 lb 10.2 oz)     Body mass index is 22.59 kg/m².      Intake/Output Summary (Last 24 hours) at 4/26/2024 1019  Last data filed at 4/26/2024 1011  Gross per 24 hour   Intake 1192 ml   Output 1425 ml   Net -233 ml       Hemodynamic Parameters:  CVP:  [5 mmHg] 5 mmHg    Telemetry: SR with PVC's       Physical Exam  Constitutional:       Appearance: He is ill-appearing.   HENT:      Head: Normocephalic and atraumatic.   Eyes:      Conjunctiva/sclera: Conjunctivae normal.      Pupils: Pupils are equal, round, and reactive to light.   Neck:      Comments: JVP 15cm of H20.   Cardiovascular:      Rate and Rhythm: Normal rate and regular rhythm.      Comments: Smooth VAD hum  Pulmonary:      Effort: Pulmonary effort is normal.      Comments: Clear to auscultation  Abdominal:      General: Bowel sounds are normal.      Palpations: Abdomen is soft.   Musculoskeletal:         General: No swelling. Normal range of motion.      Cervical back: Normal range of motion and neck supple.   Skin:     General: Skin is warm and dry.      Capillary Refill: Capillary refill takes 2 to 3 seconds.   Neurological:      General: No focal deficit present.      Mental Status: He is alert and oriented to person, place, and time.            Significant Labs:  CBC:  Recent Labs   Lab 04/24/24 0425 04/25/24  0432 04/26/24  0400   WBC 4.30 4.31 4.65   RBC 2.83* 2.47* 2.45*   HGB 7.9* 7.1* 6.7*   HCT 24.0* 21.4* 21.8*    313 305   MCV 85 87 89   MCH 27.9 28.7 27.3   MCHC 32.9 33.2 30.7*     BNP:  Recent Labs   Lab 04/22/24  0530 04/24/24  0425 04/26/24  0400   BNP 1,116* 1,263* 1,562*     CMP:  Recent Labs   Lab 04/22/24  0530 04/23/24  0422 04/24/24  0425 04/25/24  0432 04/26/24  0400      < > 95 97  103   CALCIUM 8.5*   < > 8.6* 8.5* 8.2*   ALBUMIN 2.5*  --  2.5*  --  2.4*   PROT 6.9  --  7.2  --  6.5   *   < > 135* 137 138   K 3.5   < > 3.9 3.9 3.9   CO2 21*   < > 22* 23 24   CL 91*   < > 101 103 105   *   < > 57* 55* 27*   CREATININE 8.6*   < > 5.6* 5.3* 3.2*   ALKPHOS 154*  --  201*  --  180*   *  --  186*  --  135*   *  --  140*  --  73*   BILITOT 0.2  --  0.2  --  0.2    < > = values in this interval not displayed.      Coagulation:   Recent Labs   Lab 04/24/24 0425 04/25/24  0432 04/26/24  0400   INR 3.7* 2.9* 2.4*   APTT 60.0* 53.6* 45.8*     LDH:  Recent Labs   Lab 04/24/24  0425 04/25/24  0432 04/26/24  0400   * 298* 287*     Microbiology:  Microbiology Results (last 7 days)       Procedure Component Value Units Date/Time    Blood culture [[id number]] Collected: 04/20/24 0234    Order Status: Completed Specimen: Blood Updated: 04/25/24 0612     Blood Culture, Routine No growth after 5 days.    Blood culture [[id number]] Collected: 04/20/24 0240    Order Status: Completed Specimen: Blood Updated: 04/25/24 0612     Blood Culture, Routine No growth after 5 days.    Blood culture [[id number]] Collected: 04/18/24 0534    Order Status: Completed Specimen: Blood Updated: 04/23/24 1012     Blood Culture, Routine No growth after 5 days.    Blood culture [[id number]] Collected: 04/18/24 0535    Order Status: Completed Specimen: Blood Updated: 04/23/24 1012     Blood Culture, Routine No growth after 5 days.    Blood culture x two cultures. Draw prior to antibiotics. [[id number]]  (Abnormal) Collected: 04/16/24 2322    Order Status: Completed Specimen: Blood from Peripheral, Hand, Right Updated: 04/19/24 1501     Blood Culture, Routine Gram stain aer bottle: Gram positive cocci      Gram stain nata bottle: Gram positive cocci      Positive results previously called 04/17/2024 15:08      ENTEROCOCCUS FAECALIS  For susceptibility see order #[id number]      Narrative:      Aerobic  and anaerobic    Blood culture x two cultures. Draw prior to antibiotics. [[id number]]  (Abnormal)  (Susceptibility) Collected: 04/16/24 7271    Order Status: Completed Specimen: Blood from Peripheral, Hand, Left Updated: 04/19/24 1500     Blood Culture, Routine Gram stain aer bottle: Gram positive cocci      Gram stain nata bottle: Gram positive cocci      Results called to and read back by: Rachael Horan RN 04/17/2024  15:08      ENTEROCOCCUS FAECALIS    Narrative:      Aerobic and anaerobic            I have reviewed all pertinent labs within the past 24 hours.    Estimated Creatinine Clearance: 27 mL/min (A) (based on SCr of 3.2 mg/dL (H)).    Diagnostic Results:  I have reviewed and interpreted all pertinent imaging results/findings within the past 24 hours.  Assessment and Plan:     Mr. Radha Abbott is a very pleasant 65 yo male with stage D HFrEF, ICMP with previous admission for ADHF/CS. He did undergo HM3 placement placed by Dr Washington on 12/1/23. Lengthy post op course complicated by vasoplegia(requiring Giaprezza), debility, malnutrition/impaired swallowing, and renal failure requiring HD(since weaned off). Once medically stable, he was transferred to Rehab for further PT/OT/ST. Of note, pt left with permacath still in place. He has done really well in rehab and now has continued to progress well at home. He is on a very high dose of Bumex 4 mg twice daily. VAD speed is at 5000 rpm. No VAD alarms noted on interrogation occasional PI events . Today he presents to the ED for infectious workup due to complaints of fevers of 101.5F associated with fatigue, generalized weakness, lower back pain and HA. He also reports a decline in appetite. Wife changes his driveline dressing every Saturday and has not noticed any purulance with dressing changes. Body aches/weakness started around 2am last night followed by fevers around 5pm. He has been taking tylenol which has helped brng down fevers but has not helped with HA.  HA is 5/10 in intensity. He reports slight increase in body weight/ fluid retention yesterday for which he took a dose of Metolazone with a drop in weight by ~5lbs. Denies any SOB, LE swelling at this time.      2D Echo with CFD done on 3/26/2024  LVAD: There is a Heartmate III LVAD running at 5000 RPM. The aortic valve does not open. The ventricular septum is at midline. Inflow cannula well seated at the apex. Outflow graft not visualized.    Left Ventricle: The left ventricle is moderately dilated. Normal wall thickness. Global hypokinesis present. Septal motion is abnormal. There is severely reduced systolic function with a visually estimated ejection fraction of 5 - 10%. There is diastolic dysfunction but grade cannot be determined.    Right Ventricle: Mild right ventricular enlargement. Wall thickness is normal. Right ventricle wall motion  is normal. Systolic function is normal. Pacemaker lead present in the ventricle.    Left Atrium: Left atrium is severely dilated.    Right Atrium: Right atrium is moderately dilated.    Mitral Valve: The mitral valve is repaired with an Noble stitch. There is mild regurgitation.    IVC/SVC: Normal venous pressure at 3 mmHg.    * Pneumonia due to COVID-19 virus  -4/16/24 he presented to the ED for infectious workup due to complaints of fevers of 101.5F associated with fatigue, generalized weakness, lower back pain and HA that started this morning. No evidence of drive line infection. No history of line infection.   - COVID +ve  - CT chest/ abdomen / pelvis 4/16 showed new RML opacity  - S/p remdesavir.   - continue tylenol for fevers    IVÁN (acute kidney injury)  Most likely 2/2 to ATN on CKD IV.      - HD as per nephrology.   - patient starting to produce urine. Creatinine stable.   - Will continue to monitor.  - Nephrology consulted. Appreciate recs.         Bacteremia  -blood cxs from 4/16 +ve for E faecalis   -ID following  -E faecalis is a known cause of endocarditis  and AICD lead infections.   -TTE was negative for vegetation.  However TTE does not rule out valvular or lead vegetation. Will treat this as endocarditis with 6 weeks of IV antibiotics followed by suppressive therapy   -Currently on Ampicillin and Ceftriaxone for 6wk course as per ID.   -Repeat cultures from 4/20 are negative thus far    Anemia  - possibly secondary to dilutional anemia in the setting of hypervolemia.   - Will give IV lasix and recheck cbc.     Hyponatremia  -POA  -Daily labs       Hypertension  Continue hydralazine    Headache  C/o of HA on admit with no improvement despite tylenol  - CTH done 4/16 with no acute processes    LVAD (left ventricular assist device) present  -HeartMate 3 Implanted  12/1/2023  as DT  -Cont Coumadin, dosing by PharmD.  Goal INR 2.0-3.0 . Supratherapeutic today but improving after PO vit K 2.5mg yesterday.   -Antiplatelets Not on ASA due to low H/H on implant   -LDH is stable overall today. Will continue to monitor daily.  -Speed set at  5000     -Interrogation notable for no events  -ECHO 4/18/24 with speed at 5000 EF: 10-15%, LVEDD: 6.1cm, RV size normal function mod reduced   -RHC 1/22/24 with RA 13, PCWP 22, PA 50/25, CO 5.8, CI 3.0   -Not listed for OHTx, declined for OHTX due to comorbidities     Procedure: Device Interrogation Including analysis of device parameters  Current Settings: Ventricular Assist Device  Review of device function is stable/unstable stable        4/25/2024     7:45 AM 4/25/2024     4:34 AM 4/25/2024    12:45 AM 4/24/2024    10:05 PM 4/24/2024     7:56 PM 4/24/2024     5:31 PM 4/24/2024    12:06 PM   TXP LVAD INTERROGATIONS   Type HeartMate3 HeartMate3 HeartMate3 HeartMate3 HeartMate3 HeartMate3 HeartMate3   Flow 3.8 4 3.9 3.8 3.9 3.8 3.8   Speed 5000 5000 5000 5000 5000 5000 5000   PI 4.2 4.2 4.7 5.2 5 6.1 6.1   Power (Carrington) 3.5 3.4 3.4 3.4 3.5 3.5 3.5   LSL 4800 4800 4800 4800 4800 4800 4800   Pulsatility No Pulse  Pulse Pulse Intermittent  pulse Intermittent pulse Intermittent pulse         Type 2 diabetes mellitus without complication, without long-term current use of insulin  -SSI    Acute on chronic combined systolic and diastolic heart failure  -ICM  -Last 2D Echo  4/18/24 : LVEF 10-15%, LVEDD  6.1 cm  -Hypervolemic on examination today. Will give dose of IV lasix 80mg and monitor response.   -GDMT with Hydralazine  -2g Na dietary restriction, 1500 mL fluid restriction, strict I/Os      Ventricular tachycardia  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        Angela Shen MD  Heart Transplant  [facility] - Cardiology Stepdown

## 2024-04-26 NOTE — PROGRESS NOTES
04/26/2024  Deni Frye    Current provider:  Brayan Ocampo MD    Device interrogation:      4/26/2024    12:00 PM 4/26/2024     8:00 AM 4/26/2024     4:02 AM 4/26/2024    12:19 AM 4/25/2024     8:23 PM 4/25/2024     8:22 PM 4/25/2024     3:46 PM   TXP LVAD INTERROGATIONS   Type HeartMate3 HeartMate3 HeartMate3 HeartMate3 HeartMate3 HeartMate3 HeartMate3   Flow 3.8 3.7 4.1 4.1 4  4.2   Speed 5000 5000 5050 5000 5000  5000   PI 5.4 5.4 3.8 3.6 4  3.7   Power ([place]) 3.5 3.4 3.3 3.4 3.4  3.6   LSL 4600 4600 4800 4800 4800  4800   Pulsatility Pulse Pulse Intermittent pulse Intermittent pulse Intermittent pulse  Intermittent pulse          Rounded on [facility] to ensure all mechanical assist device settings (IABP or VAD) were appropriate and all parameters were within limits.  I was able to ensure all back up equipment was present, the staff had no issues, and the Perfusion Department daily rounding was complete.      For implantable VADs: Interrogation of Ventricular assist device was performed with analysis of device parameters and review of device function. I have personally reviewed the interrogation findings and agree with findings as stated.     In emergency, the nursing units have been notified to contact the perfusion department either by:  Calling [phone] from 630am to 4pm Mon thru Fri, utilizing the On-Call Finder functionality of Epic and searching for Perfusion, or by contacting the hospital  from 4pm to 630am and on weekends and asking to speak with the perfusionist on call.    2:54 PM

## 2024-04-26 NOTE — SUBJECTIVE & OBJECTIVE
Interval History:   Overnight patient had some oozing from his trialysis line but stopped after quickclot. No acute complaints.     Continuous Infusions:  Current Facility-Administered Medications   Medication Dose Route Frequency Last Rate Last Admin     Scheduled Meds:  Current Facility-Administered Medications   Medication Dose Route Frequency    amiodarone  400 mg Oral Daily    ampicillin IV (PEDS and ADULTS)  2 g Intravenous Q12H    cefTRIAXone (Rocephin) IV (PEDS and ADULTS)  2 g Intravenous Q12H    famotidine  20 mg Oral Daily    gabapentin  100 mg Oral BID    hydrALAZINE  75 mg Oral Q8H    LIDOcaine-EPINEPHrine 1%-1:100,000  1 mL Epidural Once    magnesium oxide  400 mg Oral Daily    sodium bicarbonate  325 mg Oral TID    traZODone  25 mg Oral QHS    venlafaxine  37.5 mg Oral Daily    warfarin  2 mg Oral Daily     PRN Meds:  Current Facility-Administered Medications:     acetaminophen, 1,000 mg, Oral, Q8H PRN    dextrose 10%, 12.5 g, Intravenous, PRN    dextrose 10%, 25 g, Intravenous, PRN    glucagon (human recombinant), 1 mg, Intramuscular, PRN    glucose, 16 g, Oral, PRN    glucose, 24 g, Oral, PRN    insulin aspart U-100, 0-5 Units, Subcutaneous, QID (AC + HS) PRN    senna-docusate 8.6-50 mg, 2 tablet, Oral, Daily PRN    Review of patient's allergies indicates:  No Known Allergies  Objective:     Vital Signs (Most Recent):  Temp: 98.1 °F (36.7 °C) (04/26/24 0755)  Pulse: 85 (04/26/24 1018)  Resp: 18 (04/26/24 0755)  BP: 104/63 (04/26/24 0800)  SpO2: 98 % (04/26/24 0755) Vital Signs (24h Range):  Temp:  [97.4 °F (36.3 °C)-98.1 °F (36.7 °C)] 98.1 °F (36.7 °C)  Pulse:  [80-99] 85  Resp:  [17-18] 18  SpO2:  [94 %-99 %] 98 %  BP: ()/(0-84) 104/63     Patient Vitals for the past 72 hrs (Last 3 readings):   Weight   04/26/24 0430 79.8 kg (175 lb 14.8 oz)   04/25/24 0455 78.5 kg (173 lb 1 oz)   04/24/24 0509 77.4 kg (170 lb 10.2 oz)     Body mass index is 22.59 kg/m².      Intake/Output Summary (Last 24  hours) at 4/26/2024 1019  Last data filed at 4/26/2024 1011  Gross per 24 hour   Intake 1192 ml   Output 1425 ml   Net -233 ml       Hemodynamic Parameters:  CVP:  [5 mmHg] 5 mmHg    Telemetry: SR with PVC's       Physical Exam  Constitutional:       Appearance: He is ill-appearing.   HENT:      Head: Normocephalic and atraumatic.   Eyes:      Conjunctiva/sclera: Conjunctivae normal.      Pupils: Pupils are equal, round, and reactive to light.   Neck:      Comments: JVP 15cm of H20.   Cardiovascular:      Rate and Rhythm: Normal rate and regular rhythm.      Comments: Smooth VAD hum  Pulmonary:      Effort: Pulmonary effort is normal.      Comments: Clear to auscultation  Abdominal:      General: Bowel sounds are normal.      Palpations: Abdomen is soft.   Musculoskeletal:         General: No swelling. Normal range of motion.      Cervical back: Normal range of motion and neck supple.   Skin:     General: Skin is warm and dry.      Capillary Refill: Capillary refill takes 2 to 3 seconds.   Neurological:      General: No focal deficit present.      Mental Status: He is alert and oriented to person, place, and time.            Significant Labs:  CBC:  Recent Labs   Lab 04/24/24  0425 04/25/24  0432 04/26/24  0400   WBC 4.30 4.31 4.65   RBC 2.83* 2.47* 2.45*   HGB 7.9* 7.1* 6.7*   HCT 24.0* 21.4* 21.8*    313 305   MCV 85 87 89   MCH 27.9 28.7 27.3   MCHC 32.9 33.2 30.7*     BNP:  Recent Labs   Lab 04/22/24  0530 04/24/24  0425 04/26/24  0400   BNP 1,116* 1,263* 1,562*     CMP:  Recent Labs   Lab 04/22/24  0530 04/23/24  0422 04/24/24  0425 04/25/24  0432 04/26/24  0400      < > 95 97 103   CALCIUM 8.5*   < > 8.6* 8.5* 8.2*   ALBUMIN 2.5*  --  2.5*  --  2.4*   PROT 6.9  --  7.2  --  6.5   *   < > 135* 137 138   K 3.5   < > 3.9 3.9 3.9   CO2 21*   < > 22* 23 24   CL 91*   < > 101 103 105   *   < > 57* 55* 27*   CREATININE 8.6*   < > 5.6* 5.3* 3.2*   ALKPHOS 154*  --  201*  --  180*   *   --  186*  --  135*   *  --  140*  --  73*   BILITOT 0.2  --  0.2  --  0.2    < > = values in this interval not displayed.      Coagulation:   Recent Labs   Lab 04/24/24 0425 04/25/24  0432 04/26/24  0400   INR 3.7* 2.9* 2.4*   APTT 60.0* 53.6* 45.8*     LDH:  Recent Labs   Lab 04/24/24 0425 04/25/24  0432 04/26/24  0400   * 298* 287*     Microbiology:  Microbiology Results (last 7 days)       Procedure Component Value Units Date/Time    Blood culture [[id number]] Collected: 04/20/24 0234    Order Status: Completed Specimen: Blood Updated: 04/25/24 0612     Blood Culture, Routine No growth after 5 days.    Blood culture [[id number]] Collected: 04/20/24 0240    Order Status: Completed Specimen: Blood Updated: 04/25/24 0612     Blood Culture, Routine No growth after 5 days.    Blood culture [[id number]] Collected: 04/18/24 0534    Order Status: Completed Specimen: Blood Updated: 04/23/24 1012     Blood Culture, Routine No growth after 5 days.    Blood culture [[id number]] Collected: 04/18/24 0535    Order Status: Completed Specimen: Blood Updated: 04/23/24 1012     Blood Culture, Routine No growth after 5 days.    Blood culture x two cultures. Draw prior to antibiotics. [[id number]]  (Abnormal) Collected: 04/16/24 2322    Order Status: Completed Specimen: Blood from Peripheral, Hand, Right Updated: 04/19/24 1501     Blood Culture, Routine Gram stain aer bottle: Gram positive cocci      Gram stain nata bottle: Gram positive cocci      Positive results previously called 04/17/2024 15:08      ENTEROCOCCUS FAECALIS  For susceptibility see order #[id number]      Narrative:      Aerobic and anaerobic    Blood culture x two cultures. Draw prior to antibiotics. [[id number]]  (Abnormal)  (Susceptibility) Collected: 04/16/24 2322    Order Status: Completed Specimen: Blood from Peripheral, Hand, Left Updated: 04/19/24 1500     Blood Culture, Routine Gram stain aer bottle: Gram positive cocci      Gram stain nata  bottle: Gram positive cocci      Results called to and read back by: Rachael Horan RN 04/17/2024  15:08      ENTEROCOCCUS FAECALIS    Narrative:      Aerobic and anaerobic            I have reviewed all pertinent labs within the past 24 hours.    Estimated Creatinine Clearance: 27 mL/min (A) (based on SCr of 3.2 mg/dL (H)).    Diagnostic Results:  I have reviewed and interpreted all pertinent imaging results/findings within the past 24 hours.

## 2024-04-26 NOTE — PROGRESS NOTES
Pt and wife TIERA.  Mr. Abbott verbalizing frustration that he is still in isolation and wants to walk outside.  I message infection control, Tammy, to see if his precautions can be lifted or if he needs to move to another room to have the precautions lifted.  Waiting for response.   No other questions or needs from me at this time.

## 2024-04-27 LAB
ALLENS TEST: ABNORMAL
ANION GAP SERPL CALC-SCNC: 10 MMOL/L (ref 8–16)
APTT PPP: 41.7 SEC (ref 21–32)
BASOPHILS # BLD AUTO: 0.03 K/UL (ref 0–0.2)
BASOPHILS NFR BLD: 0.5 % (ref 0–1.9)
BUN SERPL-MCNC: 30 MG/DL (ref 8–23)
CALCIUM SERPL-MCNC: 8.5 MG/DL (ref 8.7–10.5)
CHLORIDE SERPL-SCNC: 104 MMOL/L (ref 95–110)
CO2 SERPL-SCNC: 24 MMOL/L (ref 23–29)
CREAT SERPL-MCNC: 4.1 MG/DL (ref 0.5–1.4)
DELSYS: ABNORMAL
DIFFERENTIAL METHOD BLD: ABNORMAL
EOSINOPHIL # BLD AUTO: 0.3 K/UL (ref 0–0.5)
EOSINOPHIL NFR BLD: 4.6 % (ref 0–8)
ERYTHROCYTE [DISTWIDTH] IN BLOOD BY AUTOMATED COUNT: 17.9 % (ref 11.5–14.5)
EST. GFR  (NO RACE VARIABLE): 15.7 ML/MIN/1.73 M^2
FIO2: 21
GLUCOSE SERPL-MCNC: 107 MG/DL (ref 70–110)
HCO3 UR-SCNC: 25 MMOL/L (ref 24–28)
HCT VFR BLD AUTO: 22.9 % (ref 40–54)
HCT VFR BLD CALC: 24 %PCV (ref 36–54)
HGB BLD-MCNC: 7.1 G/DL (ref 14–18)
IMM GRANULOCYTES # BLD AUTO: 0.11 K/UL (ref 0–0.04)
IMM GRANULOCYTES NFR BLD AUTO: 1.9 % (ref 0–0.5)
INR PPP: 2.1 (ref 0.8–1.2)
LDH SERPL L TO P-CCNC: 257 U/L (ref 110–260)
LYMPHOCYTES # BLD AUTO: 0.8 K/UL (ref 1–4.8)
LYMPHOCYTES NFR BLD: 14.4 % (ref 18–48)
MAGNESIUM SERPL-MCNC: 1.9 MG/DL (ref 1.6–2.6)
MCH RBC QN AUTO: 27.7 PG (ref 27–31)
MCHC RBC AUTO-ENTMCNC: 31 G/DL (ref 32–36)
MCV RBC AUTO: 90 FL (ref 82–98)
MODE: ABNORMAL
MONOCYTES # BLD AUTO: 0.5 K/UL (ref 0.3–1)
MONOCYTES NFR BLD: 9.2 % (ref 4–15)
NEUTROPHILS # BLD AUTO: 4.1 K/UL (ref 1.8–7.7)
NEUTROPHILS NFR BLD: 69.4 % (ref 38–73)
NRBC BLD-RTO: 0 /100 WBC
PCO2 BLDA: 41.3 MMHG (ref 35–45)
PH SMN: 7.39 [PH] (ref 7.35–7.45)
PHOSPHATE SERPL-MCNC: 2.9 MG/DL (ref 2.7–4.5)
PLATELET # BLD AUTO: 348 K/UL (ref 150–450)
PMV BLD AUTO: 10.3 FL (ref 9.2–12.9)
PO2 BLDA: 31 MMHG (ref 40–60)
POC BE: 0 MMOL/L
POC IONIZED CALCIUM: 1.22 MMOL/L (ref 1.06–1.42)
POC SATURATED O2: 59 % (ref 95–100)
POC TCO2: 26 MMOL/L (ref 24–29)
POCT GLUCOSE: 127 MG/DL (ref 70–110)
POCT GLUCOSE: 173 MG/DL (ref 70–110)
POCT GLUCOSE: 199 MG/DL (ref 70–110)
POCT GLUCOSE: 200 MG/DL (ref 70–110)
POCT GLUCOSE: 200 MG/DL (ref 70–110)
POTASSIUM BLD-SCNC: 4.2 MMOL/L (ref 3.5–5.1)
POTASSIUM SERPL-SCNC: 4.2 MMOL/L (ref 3.5–5.1)
PROTHROMBIN TIME: 21.6 SEC (ref 9–12.5)
RBC # BLD AUTO: 2.56 M/UL (ref 4.6–6.2)
SAMPLE: ABNORMAL
SITE: ABNORMAL
SODIUM BLD-SCNC: 140 MMOL/L (ref 136–145)
SODIUM SERPL-SCNC: 138 MMOL/L (ref 136–145)
SP02: 93
WBC # BLD AUTO: 5.84 K/UL (ref 3.9–12.7)

## 2024-04-27 PROCEDURE — 27000248 HC VAD-ADDITIONAL DAY

## 2024-04-27 PROCEDURE — 82803 BLOOD GASES ANY COMBINATION: CPT

## 2024-04-27 PROCEDURE — 80048 BASIC METABOLIC PNL TOTAL CA: CPT | Performed by: STUDENT IN AN ORGANIZED HEALTH CARE EDUCATION/TRAINING PROGRAM

## 2024-04-27 PROCEDURE — 99233 SBSQ HOSP IP/OBS HIGH 50: CPT | Mod: ,,, | Performed by: INTERNAL MEDICINE

## 2024-04-27 PROCEDURE — 25000003 PHARM REV CODE 250: Performed by: STUDENT IN AN ORGANIZED HEALTH CARE EDUCATION/TRAINING PROGRAM

## 2024-04-27 PROCEDURE — 27000923 HC TRIALYSIS CATHETER, ANY SIZE

## 2024-04-27 PROCEDURE — 85025 COMPLETE CBC W/AUTO DIFF WBC: CPT | Performed by: STUDENT IN AN ORGANIZED HEALTH CARE EDUCATION/TRAINING PROGRAM

## 2024-04-27 PROCEDURE — 83615 LACTATE (LD) (LDH) ENZYME: CPT | Performed by: STUDENT IN AN ORGANIZED HEALTH CARE EDUCATION/TRAINING PROGRAM

## 2024-04-27 PROCEDURE — 63600175 PHARM REV CODE 636 W HCPCS: Performed by: INTERNAL MEDICINE

## 2024-04-27 PROCEDURE — 93750 INTERROGATION VAD IN PERSON: CPT | Mod: ,,, | Performed by: INTERNAL MEDICINE

## 2024-04-27 PROCEDURE — 83735 ASSAY OF MAGNESIUM: CPT | Performed by: STUDENT IN AN ORGANIZED HEALTH CARE EDUCATION/TRAINING PROGRAM

## 2024-04-27 PROCEDURE — 99900035 HC TECH TIME PER 15 MIN (STAT)

## 2024-04-27 PROCEDURE — 63600175 PHARM REV CODE 636 W HCPCS: Performed by: STUDENT IN AN ORGANIZED HEALTH CARE EDUCATION/TRAINING PROGRAM

## 2024-04-27 PROCEDURE — 84100 ASSAY OF PHOSPHORUS: CPT | Performed by: STUDENT IN AN ORGANIZED HEALTH CARE EDUCATION/TRAINING PROGRAM

## 2024-04-27 PROCEDURE — 85730 THROMBOPLASTIN TIME PARTIAL: CPT | Performed by: STUDENT IN AN ORGANIZED HEALTH CARE EDUCATION/TRAINING PROGRAM

## 2024-04-27 PROCEDURE — 25000003 PHARM REV CODE 250: Performed by: INTERNAL MEDICINE

## 2024-04-27 PROCEDURE — 27000207 HC ISOLATION

## 2024-04-27 PROCEDURE — 20600001 HC STEP DOWN PRIVATE ROOM

## 2024-04-27 PROCEDURE — 85610 PROTHROMBIN TIME: CPT | Performed by: STUDENT IN AN ORGANIZED HEALTH CARE EDUCATION/TRAINING PROGRAM

## 2024-04-27 RX ORDER — MAGNESIUM SULFATE 1 G/100ML
1 INJECTION INTRAVENOUS ONCE
Status: COMPLETED | OUTPATIENT
Start: 2024-04-27 | End: 2024-04-27

## 2024-04-27 RX ORDER — BUMETANIDE 1 MG/1
4 TABLET ORAL 2 TIMES DAILY
Status: DISCONTINUED | OUTPATIENT
Start: 2024-04-27 | End: 2024-05-01 | Stop reason: HOSPADM

## 2024-04-27 RX ADMIN — AMIODARONE HYDROCHLORIDE 400 MG: 200 TABLET ORAL at 09:04

## 2024-04-27 RX ADMIN — HYDRALAZINE HYDROCHLORIDE 75 MG: 50 TABLET ORAL at 02:04

## 2024-04-27 RX ADMIN — SODIUM BICARBONATE 325 MG: 325 TABLET ORAL at 09:04

## 2024-04-27 RX ADMIN — TRAZODONE HYDROCHLORIDE 25 MG: 50 TABLET ORAL at 08:04

## 2024-04-27 RX ADMIN — SODIUM BICARBONATE 325 MG: 325 TABLET ORAL at 02:04

## 2024-04-27 RX ADMIN — BUMETANIDE 4 MG: 1 TABLET ORAL at 10:04

## 2024-04-27 RX ADMIN — WARFARIN SODIUM 2 MG: 2 TABLET ORAL at 05:04

## 2024-04-27 RX ADMIN — Medication 400 MG: at 09:04

## 2024-04-27 RX ADMIN — ACETAMINOPHEN 1000 MG: 500 TABLET ORAL at 04:04

## 2024-04-27 RX ADMIN — HYDRALAZINE HYDROCHLORIDE 75 MG: 50 TABLET ORAL at 09:04

## 2024-04-27 RX ADMIN — BUMETANIDE 4 MG: 1 TABLET ORAL at 05:04

## 2024-04-27 RX ADMIN — GABAPENTIN 100 MG: 100 CAPSULE ORAL at 08:04

## 2024-04-27 RX ADMIN — GABAPENTIN 100 MG: 100 CAPSULE ORAL at 09:04

## 2024-04-27 RX ADMIN — MAGNESIUM SULFATE HEPTAHYDRATE 1 G: 500 INJECTION, SOLUTION INTRAMUSCULAR; INTRAVENOUS at 09:04

## 2024-04-27 RX ADMIN — VENLAFAXINE 37.5 MG: 37.5 TABLET ORAL at 09:04

## 2024-04-27 RX ADMIN — FAMOTIDINE 20 MG: 20 TABLET ORAL at 09:04

## 2024-04-27 RX ADMIN — AMPICILLIN 2 G: 2 INJECTION, POWDER, FOR SOLUTION INTRAMUSCULAR; INTRAVENOUS at 10:04

## 2024-04-27 RX ADMIN — SODIUM BICARBONATE 325 MG: 325 TABLET ORAL at 08:04

## 2024-04-27 RX ADMIN — CEFTRIAXONE 2 G: 2 INJECTION, POWDER, FOR SOLUTION INTRAMUSCULAR; INTRAVENOUS at 11:04

## 2024-04-27 RX ADMIN — CEFTRIAXONE 2 G: 2 INJECTION, POWDER, FOR SOLUTION INTRAMUSCULAR; INTRAVENOUS at 10:04

## 2024-04-27 RX ADMIN — HYDRALAZINE HYDROCHLORIDE 75 MG: 50 TABLET ORAL at 06:04

## 2024-04-27 RX ADMIN — AMPICILLIN 2 G: 2 INJECTION, POWDER, FOR SOLUTION INTRAMUSCULAR; INTRAVENOUS at 08:04

## 2024-04-27 NOTE — PROGRESS NOTES
04/27/2024  [place] Duarte    Current provider:  Brayan Ocampo MD    Device interrogation:      4/27/2024     4:00 PM 4/27/2024     8:48 AM 4/27/2024     5:05 AM 4/26/2024    11:27 PM 4/26/2024     8:39 PM 4/26/2024     4:00 PM 4/26/2024    12:00 PM   TXP LVAD INTERROGATIONS   Type HeartMate3 HeartMate3 HeartMate3 HeartMate3 HeartMate3 HeartMate3 HeartMate3   Flow 4 3.9 3.6 3.6 3.9 3.8 3.8   Speed 5000 5000 5000 5000 5000 5000 5000   PI 4.3 4.4 5.8 6.1 5.5 5.7 5.4   Power ([place]) 3.5 3.4 3.3 3.4 3.5 3.5 3.5   LSL 4600 4600 4600 4600  4600 4600   Pulsatility Pulse Pulse Pulse Pulse Pulse Pulse Pulse          Rounded on [facility] to ensure all mechanical assist device settings (IABP or VAD) were appropriate and all parameters were within limits.  I was able to ensure all back up equipment was present, the staff had no issues, and the Perfusion Department daily rounding was complete.      For implantable VADs: Interrogation of Ventricular assist device was performed with analysis of device parameters and review of device function. I have personally reviewed the interrogation findings and agree with findings as stated.     In emergency, the nursing units have been notified to contact the perfusion department either by:  Calling [phone] from 630am to 4pm Mon thru Fri, utilizing the On-Call Finder functionality of Epic and searching for Perfusion, or by contacting the hospital  from 4pm to 630am and on weekends and asking to speak with the perfusionist on call.    4:50 PM

## 2024-04-27 NOTE — PLAN OF CARE
Pt A&Ox4. Airborne isolation maintained.solation maintained. LVAD properly working without difficulty. VAD dressing done at bedside by wife and wife reports minimal drainage.Pt remains free of falls, injury or trauma.      Problem: Adult Inpatient Plan of Care  Goal: Plan of Care Review  Outcome: Progressing  Goal: Patient-Specific Goal (Individualized)  Outcome: Progressing  Goal: Absence of Hospital-Acquired Illness or Injury  Outcome: Progressing  Goal: Optimal Comfort and Wellbeing  Outcome: Progressing  Goal: Readiness for Transition of Care  Outcome: Progressing     Problem: Diabetes Comorbidity  Goal: Blood Glucose Level Within Targeted Range  Outcome: Progressing     Problem: Adjustment to Illness (Sepsis/Septic Shock)  Goal: Optimal Coping  Outcome: Progressing     Problem: Bleeding (Sepsis/Septic Shock)  Goal: Absence of Bleeding  Outcome: Progressing     Problem: Glycemic Control Impaired (Sepsis/Septic Shock)  Goal: Blood Glucose Level Within Desired Range  Outcome: Progressing     Problem: Infection Progression (Sepsis/Septic Shock)  Goal: Absence of Infection Signs and Symptoms  Outcome: Progressing     Problem: Nutrition Impaired (Sepsis/Septic Shock)  Goal: Optimal Nutrition Intake  Outcome: Progressing     Problem: Skin Injury Risk Increased  Goal: Skin Health and Integrity  Outcome: Progressing     Problem: Fluid Imbalance (Pneumonia)  Goal: Fluid Balance  Outcome: Progressing     Problem: Infection (Pneumonia)  Goal: Resolution of Infection Signs and Symptoms  Outcome: Progressing     Problem: Respiratory Compromise (Pneumonia)  Goal: Effective Oxygenation and Ventilation  Outcome: Progressing     Problem: Fall Injury Risk  Goal: Absence of Fall and Fall-Related Injury  Outcome: Progressing     Problem: Fluid and Electrolyte Imbalance (Acute Kidney Injury/Impairment)  Goal: Fluid and Electrolyte Balance  Outcome: Progressing     Problem: Oral Intake Inadequate (Acute Kidney  Injury/Impairment)  Goal: Optimal Nutrition Intake  Outcome: Progressing     Problem: Renal Function Impairment (Acute Kidney Injury/Impairment)  Goal: Effective Renal Function  Outcome: Progressing     Problem: Infection  Goal: Absence of Infection Signs and Symptoms  Outcome: Progressing     Problem: Device-Related Complication Risk (Hemodialysis)  Goal: Safe, Effective Therapy Delivery  Outcome: Progressing     Problem: Hemodynamic Instability (Hemodialysis)  Goal: Effective Tissue Perfusion  Outcome: Progressing     Problem: Infection (Hemodialysis)  Goal: Absence of Infection Signs and Symptoms  Outcome: Progressing     Problem: Adjustment to Device (Ventricular Assist Device)  Goal: Optimal Adjustment to Device  Outcome: Progressing     Problem: Bleeding (Ventricular Assist Device)  Goal: Absence of Bleeding  Outcome: Progressing     Problem: Embolism (Ventricular Assist Device)  Goal: Absence of Embolism Signs and Symptoms  Outcome: Progressing     Problem: Hemodynamic Instability (Ventricular Assist Device)  Goal: Optimal Blood Flow  Outcome: Progressing     Problem: Infection (Ventricular Assist Device)  Goal: Absence of Infection Signs and Symptoms  Outcome: Progressing     Problem: Right-Sided Heart Compromise (Ventricular Assist Device)  Goal: Effective Right-Sided Heart Function  Outcome: Progressing

## 2024-04-27 NOTE — PLAN OF CARE
Pt maintained free from falls/trauma/injuries and skin breakdown. HM3 with speed of 5000 and smooth hum noted with auscultation. No alarms activated throughout shift. Cuff pressures obtained with MAP  ranging between 86-87. Patient has been pulsatile with Dopplers ranging from . RIJ trialysis patent and good blood return. Plan of care reviewed with patient and spouse. Pt verbalized understanding. All questions and concerns addressed.       Problem: Adult Inpatient Plan of Care  Goal: Plan of Care Review  Outcome: Progressing  Goal: Patient-Specific Goal (Individualized)  Outcome: Progressing  Goal: Absence of Hospital-Acquired Illness or Injury  Outcome: Progressing  Goal: Optimal Comfort and Wellbeing  Outcome: Progressing  Goal: Readiness for Transition of Care  Outcome: Progressing     Problem: Diabetes Comorbidity  Goal: Blood Glucose Level Within Targeted Range  Outcome: Progressing     Problem: Adjustment to Illness (Sepsis/Septic Shock)  Goal: Optimal Coping  Outcome: Progressing     Problem: Bleeding (Sepsis/Septic Shock)  Goal: Absence of Bleeding  Outcome: Progressing     Problem: Glycemic Control Impaired (Sepsis/Septic Shock)  Goal: Blood Glucose Level Within Desired Range  Outcome: Progressing     Problem: Infection Progression (Sepsis/Septic Shock)  Goal: Absence of Infection Signs and Symptoms  Outcome: Progressing     Problem: Nutrition Impaired (Sepsis/Septic Shock)  Goal: Optimal Nutrition Intake  Outcome: Progressing     Problem: Skin Injury Risk Increased  Goal: Skin Health and Integrity  Outcome: Progressing     Problem: Fluid Imbalance (Pneumonia)  Goal: Fluid Balance  Outcome: Progressing     Problem: Infection (Pneumonia)  Goal: Resolution of Infection Signs and Symptoms  Outcome: Progressing     Problem: Respiratory Compromise (Pneumonia)  Goal: Effective Oxygenation and Ventilation  Outcome: Progressing     Problem: Fall Injury Risk  Goal: Absence of Fall and Fall-Related  Injury  Outcome: Progressing     Problem: Fluid and Electrolyte Imbalance (Acute Kidney Injury/Impairment)  Goal: Fluid and Electrolyte Balance  Outcome: Progressing     Problem: Oral Intake Inadequate (Acute Kidney Injury/Impairment)  Goal: Optimal Nutrition Intake  Outcome: Progressing     Problem: Device-Related Complication Risk (Hemodialysis)  Goal: Safe, Effective Therapy Delivery  Outcome: Progressing     Problem: Hemodynamic Instability (Hemodialysis)  Goal: Effective Tissue Perfusion  Outcome: Progressing

## 2024-04-27 NOTE — CARE UPDATE
Care Update:     No acute events overnight. Patient in room 311/311 A. Blood glucose stable on current inpatient regimen.No hypoglycemia noted over the past 24-hours. Endocrine will continue to follow and manage insulin orders inpatient.       Steroid use- None.   Renal function-   Lab Results   Component Value Date    CREATININE 4.1 (H) 04/27/2024        Diet Renal Coumadin Restriction; Fluid - 1500mL; Isolation Tray - Regular [place]     POCT Glucose   Date Value Ref Range Status   04/26/2024 199 (H) 70 - 110 mg/dL Final   04/26/2024 158 (H) 70 - 110 mg/dL Final   04/26/2024 180 (H) 70 - 110 mg/dL Final   04/26/2024 222 (H) 70 - 110 mg/dL Final   04/26/2024 193 (H) 70 - 110 mg/dL Final   04/25/2024 181 (H) 70 - 110 mg/dL Final   04/25/2024 365 (H) 70 - 110 mg/dL Final   04/25/2024 132 (H) 70 - 110 mg/dL Final   04/25/2024 170 (H) 70 - 110 mg/dL Final   04/24/2024 201 (H) 70 - 110 mg/dL Final   04/24/2024 160 (H) 70 - 110 mg/dL Final   04/24/2024 217 (H) 70 - 110 mg/dL Final   04/24/2024 166 (H) 70 - 110 mg/dL Final     Lab Results   Component Value Date    HGBA1C 7.4 (H) 04/17/2024       Endocrine  Type 2 diabetes mellitus without complication, without long-term current use of insulin  Endocrinology consulted for BG management.   BG goal 140-180     - Novolog (Insulin Aspart) prn for BG excursions [facility] SSI (150/50)  - BG checks AC/HS  - Hypoglycemia protocol in place     ** Please notify Endocrine for any change and/or advance in diet**  ** Please call Endocrine for any BG related issues **     Discharge Planning:   TBD. Please notify endocrinology prior to discharge.

## 2024-04-27 NOTE — SUBJECTIVE & OBJECTIVE
Interval History:   NAEON. No acute complaints. Denies any bleeding including sebas, hematochezia, hematuria.     Continuous Infusions:  Current Facility-Administered Medications   Medication Dose Route Frequency Last Rate Last Admin     Scheduled Meds:  Current Facility-Administered Medications   Medication Dose Route Frequency    amiodarone  400 mg Oral Daily    ampicillin IV (PEDS and ADULTS)  2 g Intravenous Q12H    bumetanide  4 mg Oral BID loop    cefTRIAXone (Rocephin) IV (PEDS and ADULTS)  2 g Intravenous Q12H    famotidine  20 mg Oral Daily    gabapentin  100 mg Oral BID    hydrALAZINE  75 mg Oral Q8H    magnesium oxide  400 mg Oral Daily    magnesium sulfate IVPB  1 g Intravenous Once    sodium bicarbonate  325 mg Oral TID    traZODone  25 mg Oral QHS    venlafaxine  37.5 mg Oral Daily    warfarin  2 mg Oral Daily     PRN Meds:  Current Facility-Administered Medications:     acetaminophen, 1,000 mg, Oral, Q8H PRN    dextrose 10%, 12.5 g, Intravenous, PRN    dextrose 10%, 25 g, Intravenous, PRN    glucagon (human recombinant), 1 mg, Intramuscular, PRN    glucose, 16 g, Oral, PRN    glucose, 24 g, Oral, PRN    insulin aspart U-100, 0-5 Units, Subcutaneous, QID (AC + HS) PRN    senna-docusate 8.6-50 mg, 2 tablet, Oral, Daily PRN    Review of patient's allergies indicates:  No Known Allergies  Objective:     Vital Signs (Most Recent):  Temp: 97 °F (36.1 °C) (04/27/24 0846)  Pulse: 84 (04/27/24 0846)  Resp: 18 (04/27/24 0846)  BP: (!) 82/0 (04/27/24 0847)  SpO2: 95 % (04/27/24 0846) Vital Signs (24h Range):  Temp:  [97 °F (36.1 °C)-97.7 °F (36.5 °C)] 97 °F (36.1 °C)  Pulse:  [79-91] 84  Resp:  [16-18] 18  SpO2:  [94 %-99 %] 95 %  BP: ()/(0-82) 82/0     Patient Vitals for the past 72 hrs (Last 3 readings):   Weight   04/26/24 0430 79.8 kg (175 lb 14.8 oz)   04/25/24 0723 78.5 kg (173 lb 1 oz)     Body mass index is 22.59 kg/m².      Intake/Output Summary (Last 24 hours) at 4/27/2024 9413  Last data filed  at 4/27/2024 0619  Gross per 24 hour   Intake 1182 ml   Output 2000 ml   Net -818 ml       Hemodynamic Parameters:  CVP:  [9 mmHg] 9 mmHg    Telemetry: SR with PVC's       Physical Exam  Constitutional:       Appearance: He is ill-appearing.   HENT:      Head: Normocephalic and atraumatic.   Eyes:      Conjunctiva/sclera: Conjunctivae normal.      Pupils: Pupils are equal, round, and reactive to light.   Neck:      Comments: JVP not visible on exam  Cardiovascular:      Rate and Rhythm: Normal rate and regular rhythm.      Comments: Smooth VAD hum  Pulmonary:      Effort: Pulmonary effort is normal.      Comments: Clear to auscultation  Abdominal:      General: Bowel sounds are normal.      Palpations: Abdomen is soft.   Musculoskeletal:         General: No swelling. Normal range of motion.      Cervical back: Normal range of motion and neck supple.   Skin:     General: Skin is warm and dry.      Capillary Refill: Capillary refill takes 2 to 3 seconds.   Neurological:      General: No focal deficit present.      Mental Status: He is alert and oriented to person, place, and time.            Significant Labs:  CBC:  Recent Labs   Lab 04/26/24  0400 04/26/24  1728 04/27/24  0510 04/27/24  0520   WBC 4.65 5.38  --  5.84   RBC 2.45* 2.70*  --  2.56*   HGB 6.7* 7.7*  --  7.1*   HCT 21.8* 24.1* 24* 22.9*    319  --  348   MCV 89 89  --  90   MCH 27.3 28.5  --  27.7   MCHC 30.7* 32.0  --  31.0*     BNP:  Recent Labs   Lab 04/22/24  0530 04/24/24  0425 04/26/24  0400   BNP 1,116* 1,263* 1,562*     CMP:  Recent Labs   Lab 04/22/24  0530 04/23/24  0422 04/24/24  0425 04/25/24  0432 04/26/24  0400 04/26/24  1423 04/27/24  0520      < > 95 97 103  --  107   CALCIUM 8.5*   < > 8.6* 8.5* 8.2*  --  8.5*   ALBUMIN 2.5*  --  2.5*  --  2.4*  --   --    PROT 6.9  --  7.2  --  6.5  --   --    *   < > 135* 137 138  --  138   K 3.5   < > 3.9 3.9 3.9 3.8 4.2   CO2 21*   < > 22* 23 24  --  24   CL 91*   < > 101 103 105   --  104   *   < > 57* 55* 27*  --  30*   CREATININE 8.6*   < > 5.6* 5.3* 3.2*  --  4.1*   ALKPHOS 154*  --  201*  --  180*  --   --    *  --  186*  --  135*  --   --    *  --  140*  --  73*  --   --    BILITOT 0.2  --  0.2  --  0.2  --   --     < > = values in this interval not displayed.      Coagulation:   Recent Labs   Lab 04/25/24 0432 04/26/24 0400 04/27/24 0520   INR 2.9* 2.4* 2.1*   APTT 53.6* 45.8* 41.7*     LDH:  Recent Labs   Lab 04/25/24 0432 04/26/24 0400 04/27/24 0520   * 287* 257     Microbiology:  Microbiology Results (last 7 days)       Procedure Component Value Units Date/Time    Blood culture [[id number]] Collected: 04/20/24 0234    Order Status: Completed Specimen: Blood Updated: 04/25/24 0612     Blood Culture, Routine No growth after 5 days.    Blood culture [[id number]] Collected: 04/20/24 0240    Order Status: Completed Specimen: Blood Updated: 04/25/24 0612     Blood Culture, Routine No growth after 5 days.    Blood culture [[id number]] Collected: 04/18/24 0534    Order Status: Completed Specimen: Blood Updated: 04/23/24 1012     Blood Culture, Routine No growth after 5 days.    Blood culture [[id number]] Collected: 04/18/24 0535    Order Status: Completed Specimen: Blood Updated: 04/23/24 1012     Blood Culture, Routine No growth after 5 days.            I have reviewed all pertinent labs within the past 24 hours.    Estimated Creatinine Clearance: 21.1 mL/min (A) (based on SCr of 4.1 mg/dL (H)).    Diagnostic Results:  I have reviewed and interpreted all pertinent imaging results/findings within the past 24 hours.

## 2024-04-27 NOTE — PROGRESS NOTES
[facility] - Cardiology Stepdown  Heart Transplant  Progress Note    Patient Name: Radha Abbott  MRN: [id number]  Admission Date: 4/16/2024  Hospital Length of Stay: 11 days  Attending Physician: Brayan Ocampo MD  Primary Care Provider: Vasu Kong MD  Principal Problem:Pneumonia due to COVID-19 virus    Subjective:   Interval History:   NAEON. No acute complaints. Denies any bleeding including sebas, hematochezia, hematuria.     Continuous Infusions:  Current Facility-Administered Medications   Medication Dose Route Frequency Last Rate Last Admin     Scheduled Meds:  Current Facility-Administered Medications   Medication Dose Route Frequency    amiodarone  400 mg Oral Daily    ampicillin IV (PEDS and ADULTS)  2 g Intravenous Q12H    bumetanide  4 mg Oral BID loop    cefTRIAXone (Rocephin) IV (PEDS and ADULTS)  2 g Intravenous Q12H    famotidine  20 mg Oral Daily    gabapentin  100 mg Oral BID    hydrALAZINE  75 mg Oral Q8H    magnesium oxide  400 mg Oral Daily    magnesium sulfate IVPB  1 g Intravenous Once    sodium bicarbonate  325 mg Oral TID    traZODone  25 mg Oral QHS    venlafaxine  37.5 mg Oral Daily    warfarin  2 mg Oral Daily     PRN Meds:  Current Facility-Administered Medications:     acetaminophen, 1,000 mg, Oral, Q8H PRN    dextrose 10%, 12.5 g, Intravenous, PRN    dextrose 10%, 25 g, Intravenous, PRN    glucagon (human recombinant), 1 mg, Intramuscular, PRN    glucose, 16 g, Oral, PRN    glucose, 24 g, Oral, PRN    insulin aspart U-100, 0-5 Units, Subcutaneous, QID (AC + HS) PRN    senna-docusate 8.6-50 mg, 2 tablet, Oral, Daily PRN    Review of patient's allergies indicates:  No Known Allergies  Objective:     Vital Signs (Most Recent):  Temp: 97 °F (36.1 °C) (04/27/24 0846)  Pulse: 84 (04/27/24 0846)  Resp: 18 (04/27/24 0846)  BP: (!) 82/0 (04/27/24 0847)  SpO2: 95 % (04/27/24 0846) Vital Signs (24h Range):  Temp:  [97 °F (36.1 °C)-97.7 °F (36.5 °C)] 97 °F (36.1 °C)  Pulse:  [79-91]  84  Resp:  [16-18] 18  SpO2:  [94 %-99 %] 95 %  BP: ()/(0-82) 82/0     Patient Vitals for the past 72 hrs (Last 3 readings):   Weight   04/26/24 0430 79.8 kg (175 lb 14.8 oz)   04/25/24 0455 78.5 kg (173 lb 1 oz)     Body mass index is 22.59 kg/m².      Intake/Output Summary (Last 24 hours) at 4/27/2024 0929  Last data filed at 4/27/2024 0619  Gross per 24 hour   Intake 1182 ml   Output 2000 ml   Net -818 ml       Hemodynamic Parameters:  CVP:  [9 mmHg] 9 mmHg    Telemetry: SR with PVC's       Physical Exam  Constitutional:       Appearance: He is ill-appearing.   HENT:      Head: Normocephalic and atraumatic.   Eyes:      Conjunctiva/sclera: Conjunctivae normal.      Pupils: Pupils are equal, round, and reactive to light.   Neck:      Comments: JVP not visible on exam  Cardiovascular:      Rate and Rhythm: Normal rate and regular rhythm.      Comments: Smooth VAD hum  Pulmonary:      Effort: Pulmonary effort is normal.      Comments: Clear to auscultation  Abdominal:      General: Bowel sounds are normal.      Palpations: Abdomen is soft.   Musculoskeletal:         General: No swelling. Normal range of motion.      Cervical back: Normal range of motion and neck supple.   Skin:     General: Skin is warm and dry.      Capillary Refill: Capillary refill takes 2 to 3 seconds.   Neurological:      General: No focal deficit present.      Mental Status: He is alert and oriented to person, place, and time.            Significant Labs:  CBC:  Recent Labs   Lab 04/26/24  0400 04/26/24  1728 04/27/24  0510 04/27/24  0520   WBC 4.65 5.38  --  5.84   RBC 2.45* 2.70*  --  2.56*   HGB 6.7* 7.7*  --  7.1*   HCT 21.8* 24.1* 24* 22.9*    319  --  348   MCV 89 89  --  90   MCH 27.3 28.5  --  27.7   MCHC 30.7* 32.0  --  31.0*     BNP:  Recent Labs   Lab 04/22/24  0530 04/24/24  0425 04/26/24  0400   BNP 1,116* 1,263* 1,562*     CMP:  Recent Labs   Lab 04/22/24  0530 04/23/24  0422 04/24/24  0425 04/25/24  0432  04/26/24  0400 04/26/24  1423 04/27/24  0520      < > 95 97 103  --  107   CALCIUM 8.5*   < > 8.6* 8.5* 8.2*  --  8.5*   ALBUMIN 2.5*  --  2.5*  --  2.4*  --   --    PROT 6.9  --  7.2  --  6.5  --   --    *   < > 135* 137 138  --  138   K 3.5   < > 3.9 3.9 3.9 3.8 4.2   CO2 21*   < > 22* 23 24  --  24   CL 91*   < > 101 103 105  --  104   *   < > 57* 55* 27*  --  30*   CREATININE 8.6*   < > 5.6* 5.3* 3.2*  --  4.1*   ALKPHOS 154*  --  201*  --  180*  --   --    *  --  186*  --  135*  --   --    *  --  140*  --  73*  --   --    BILITOT 0.2  --  0.2  --  0.2  --   --     < > = values in this interval not displayed.      Coagulation:   Recent Labs   Lab 04/25/24  0432 04/26/24  0400 04/27/24  0520   INR 2.9* 2.4* 2.1*   APTT 53.6* 45.8* 41.7*     LDH:  Recent Labs   Lab 04/25/24  0432 04/26/24  0400 04/27/24  0520   * 287* 257     Microbiology:  Microbiology Results (last 7 days)       Procedure Component Value Units Date/Time    Blood culture [[id number]] Collected: 04/20/24 0234    Order Status: Completed Specimen: Blood Updated: 04/25/24 0612     Blood Culture, Routine No growth after 5 days.    Blood culture [[id number]] Collected: 04/20/24 0240    Order Status: Completed Specimen: Blood Updated: 04/25/24 0612     Blood Culture, Routine No growth after 5 days.    Blood culture [[id number]] Collected: 04/18/24 0534    Order Status: Completed Specimen: Blood Updated: 04/23/24 1012     Blood Culture, Routine No growth after 5 days.    Blood culture [[id number]] Collected: 04/18/24 0535    Order Status: Completed Specimen: Blood Updated: 04/23/24 1012     Blood Culture, Routine No growth after 5 days.            I have reviewed all pertinent labs within the past 24 hours.    Estimated Creatinine Clearance: 21.1 mL/min (A) (based on SCr of 4.1 mg/dL (H)).    Diagnostic Results:  I have reviewed and interpreted all pertinent imaging results/findings within the past 24  hours.  Assessment and Plan:     Mr. Radha Abbott is a very pleasant 63 yo male with stage D HFrEF, ICMP with previous admission for ADHF/CS. He did undergo HM3 placement placed by Dr Washington on 12/1/23. Lengthy post op course complicated by vasoplegia(requiring Giaprezza), debility, malnutrition/impaired swallowing, and renal failure requiring HD(since weaned off). Once medically stable, he was transferred to Rehab for further PT/OT/ST. Of note, pt left with permacath still in place. He has done really well in rehab and now has continued to progress well at home. He is on a very high dose of Bumex 4 mg twice daily. VAD speed is at 5000 rpm. No VAD alarms noted on interrogation occasional PI events . Today he presents to the ED for infectious workup due to complaints of fevers of 101.5F associated with fatigue, generalized weakness, lower back pain and HA. He also reports a decline in appetite. Wife changes his driveline dressing every Saturday and has not noticed any purulance with dressing changes. Body aches/weakness started around 2am last night followed by fevers around 5pm. He has been taking tylenol which has helped brng down fevers but has not helped with HA. HA is 5/10 in intensity. He reports slight increase in body weight/ fluid retention yesterday for which he took a dose of Metolazone with a drop in weight by ~5lbs. Denies any SOB, LE swelling at this time.      2D Echo with CFD done on 3/26/2024  LVAD: There is a Heartmate III LVAD running at 5000 RPM. The aortic valve does not open. The ventricular septum is at midline. Inflow cannula well seated at the apex. Outflow graft not visualized.    Left Ventricle: The left ventricle is moderately dilated. Normal wall thickness. Global hypokinesis present. Septal motion is abnormal. There is severely reduced systolic function with a visually estimated ejection fraction of 5 - 10%. There is diastolic dysfunction but grade cannot be determined.    Right  Ventricle: Mild right ventricular enlargement. Wall thickness is normal. Right ventricle wall motion  is normal. Systolic function is normal. Pacemaker lead present in the ventricle.    Left Atrium: Left atrium is severely dilated.    Right Atrium: Right atrium is moderately dilated.    Mitral Valve: The mitral valve is repaired with an Noble stitch. There is mild regurgitation.    IVC/SVC: Normal venous pressure at 3 mmHg.    * Pneumonia due to COVID-19 virus  -4/16/24 he presented to the ED for infectious workup due to complaints of fevers of 101.5F associated with fatigue, generalized weakness, lower back pain and HA that started this morning. No evidence of drive line infection. No history of line infection.   - COVID +ve  - CT chest/ abdomen / pelvis 4/16 showed new RML opacity  - S/p remdesavir.   - continue tylenol for fevers    IVÁN (acute kidney injury)  Most likely 2/2 to ATN on CKD IV.      - HD as per nephrology.   - patient starting to produce urine. Creatinine stable.   - Will continue to monitor.  - Nephrology consulted. Appreciate recs.         Bacteremia  -blood cxs from 4/16 +ve for E faecalis   -ID following  -E faecalis is a known cause of endocarditis and AICD lead infections.   -TTE was negative for vegetation.  However TTE does not rule out valvular or lead vegetation. Will treat this as endocarditis with 6 weeks of IV antibiotics followed by suppressive therapy   -Currently on Ampicillin and Ceftriaxone for 6wk course as per ID.   -Repeat cultures from 4/20 are negative thus far    Anemia  - possibly secondary to dilutional anemia in the setting of hypervolemia.   - Will give IV lasix and recheck cbc.     Hyponatremia  -POA  -Daily labs       Hypertension  Continue hydralazine    Headache  C/o of HA on admit with no improvement despite tylenol  - CTH done 4/16 with no acute processes    LVAD (left ventricular assist device) present  -HeartMate 3 Implanted  12/1/2023  as DT  -Cont Coumadin,  dosing by PharmD.  Goal INR 2.0-3.0 . Therapeutic today.   -Antiplatelets Not on ASA due to low H/H on implant   -LDH is stable overall today. Will continue to monitor daily.  -Speed set at  5000     -Interrogation notable for no events  -ECHO 4/18/24 with speed at 5000 EF: 10-15%, LVEDD: 6.1cm, RV size normal function mod reduced   -RHC 1/22/24 with RA 13, PCWP 22, PA 50/25, CO 5.8, CI 3.0   -Not listed for OHTx, declined for OHTX due to comorbidities     Procedure: Device Interrogation Including analysis of device parameters  Current Settings: Ventricular Assist Device  Review of device function is stable/unstable stable        4/27/2024     8:48 AM 4/27/2024     5:05 AM 4/26/2024    11:27 PM 4/26/2024     8:39 PM 4/26/2024     4:00 PM 4/26/2024    12:00 PM 4/26/2024     8:00 AM   TXP LVAD INTERROGATIONS   Type HeartMate3 HeartMate3 HeartMate3 HeartMate3 HeartMate3 HeartMate3 HeartMate3   Flow 3.9 3.6 3.6 3.9 3.8 3.8 3.7   Speed 5000 5000 5000 5000 5000 5000 5000   PI 4.4 5.8 6.1 5.5 5.7 5.4 5.4   Power ([place]) 3.4 3.3 3.4 3.5 3.5 3.5 3.4   LSL 4600 4600 4600  4600 4600 4600   Pulsatility Pulse Pulse Pulse Pulse Pulse Pulse Pulse         Type 2 diabetes mellitus without complication, without long-term current use of insulin  -SSI    Acute on chronic combined systolic and diastolic heart failure  -ICM  -Last 2D Echo  4/18/24 : LVEF 10-15%, LVEDD  6.1 cm  -Euvolemic on examination today. Will restart home bumex 4mg bid.   -GDMT with Hydralazine  -2g Na dietary restriction, 1500 mL fluid restriction, strict I/Os      Ventricular tachycardia  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        Angela Shen MD  Heart Transplant  [facility] - Cardiology Stepdown

## 2024-04-28 LAB
ALLENS TEST: ABNORMAL
ANION GAP SERPL CALC-SCNC: 12 MMOL/L (ref 8–16)
APTT PPP: 39.8 SEC (ref 21–32)
BASOPHILS # BLD AUTO: 0.03 K/UL (ref 0–0.2)
BASOPHILS NFR BLD: 0.5 % (ref 0–1.9)
BUN SERPL-MCNC: 29 MG/DL (ref 8–23)
CALCIUM SERPL-MCNC: 8.6 MG/DL (ref 8.7–10.5)
CHLORIDE SERPL-SCNC: 103 MMOL/L (ref 95–110)
CO2 SERPL-SCNC: 23 MMOL/L (ref 23–29)
CREAT SERPL-MCNC: 4.2 MG/DL (ref 0.5–1.4)
DELSYS: ABNORMAL
DIFFERENTIAL METHOD BLD: ABNORMAL
EOSINOPHIL # BLD AUTO: 0.2 K/UL (ref 0–0.5)
EOSINOPHIL NFR BLD: 3.3 % (ref 0–8)
ERYTHROCYTE [DISTWIDTH] IN BLOOD BY AUTOMATED COUNT: 17.8 % (ref 11.5–14.5)
EST. GFR  (NO RACE VARIABLE): 15.2 ML/MIN/1.73 M^2
GLUCOSE SERPL-MCNC: 79 MG/DL (ref 70–110)
HCO3 UR-SCNC: 25.4 MMOL/L (ref 24–28)
HCT VFR BLD AUTO: 22.6 % (ref 40–54)
HGB BLD-MCNC: 6.9 G/DL (ref 14–18)
IMM GRANULOCYTES # BLD AUTO: 0.1 K/UL (ref 0–0.04)
IMM GRANULOCYTES NFR BLD AUTO: 1.5 % (ref 0–0.5)
INR PPP: 1.9 (ref 0.8–1.2)
LDH SERPL L TO P-CCNC: 244 U/L (ref 110–260)
LYMPHOCYTES # BLD AUTO: 1 K/UL (ref 1–4.8)
LYMPHOCYTES NFR BLD: 15.6 % (ref 18–48)
MAGNESIUM SERPL-MCNC: 1.9 MG/DL (ref 1.6–2.6)
MCH RBC QN AUTO: 27.3 PG (ref 27–31)
MCHC RBC AUTO-ENTMCNC: 30.5 G/DL (ref 32–36)
MCV RBC AUTO: 89 FL (ref 82–98)
MODE: ABNORMAL
MONOCYTES # BLD AUTO: 0.5 K/UL (ref 0.3–1)
MONOCYTES NFR BLD: 7.6 % (ref 4–15)
NEUTROPHILS # BLD AUTO: 4.7 K/UL (ref 1.8–7.7)
NEUTROPHILS NFR BLD: 71.5 % (ref 38–73)
NRBC BLD-RTO: 0 /100 WBC
PCO2 BLDA: 40.8 MMHG (ref 35–45)
PH SMN: 7.4 [PH] (ref 7.35–7.45)
PHOSPHATE SERPL-MCNC: 3.3 MG/DL (ref 2.7–4.5)
PLATELET # BLD AUTO: 350 K/UL (ref 150–450)
PMV BLD AUTO: 10.7 FL (ref 9.2–12.9)
PO2 BLDA: 33 MMHG (ref 40–60)
POC BE: 1 MMOL/L
POC SATURATED O2: 64 % (ref 95–100)
POC TCO2: 27 MMOL/L (ref 24–29)
POCT GLUCOSE: 138 MG/DL (ref 70–110)
POCT GLUCOSE: 184 MG/DL (ref 70–110)
POCT GLUCOSE: 199 MG/DL (ref 70–110)
POTASSIUM SERPL-SCNC: 4 MMOL/L (ref 3.5–5.1)
PROTHROMBIN TIME: 20.3 SEC (ref 9–12.5)
RBC # BLD AUTO: 2.53 M/UL (ref 4.6–6.2)
SAMPLE: ABNORMAL
SITE: ABNORMAL
SODIUM SERPL-SCNC: 138 MMOL/L (ref 136–145)
SP02: 99
WBC # BLD AUTO: 6.59 K/UL (ref 3.9–12.7)

## 2024-04-28 PROCEDURE — 83615 LACTATE (LD) (LDH) ENZYME: CPT | Performed by: STUDENT IN AN ORGANIZED HEALTH CARE EDUCATION/TRAINING PROGRAM

## 2024-04-28 PROCEDURE — 63600175 PHARM REV CODE 636 W HCPCS: Performed by: INTERNAL MEDICINE

## 2024-04-28 PROCEDURE — 85730 THROMBOPLASTIN TIME PARTIAL: CPT | Performed by: STUDENT IN AN ORGANIZED HEALTH CARE EDUCATION/TRAINING PROGRAM

## 2024-04-28 PROCEDURE — 93750 INTERROGATION VAD IN PERSON: CPT | Mod: ,,, | Performed by: INTERNAL MEDICINE

## 2024-04-28 PROCEDURE — 83735 ASSAY OF MAGNESIUM: CPT | Performed by: STUDENT IN AN ORGANIZED HEALTH CARE EDUCATION/TRAINING PROGRAM

## 2024-04-28 PROCEDURE — 20600001 HC STEP DOWN PRIVATE ROOM

## 2024-04-28 PROCEDURE — 27000248 HC VAD-ADDITIONAL DAY

## 2024-04-28 PROCEDURE — 80048 BASIC METABOLIC PNL TOTAL CA: CPT | Performed by: STUDENT IN AN ORGANIZED HEALTH CARE EDUCATION/TRAINING PROGRAM

## 2024-04-28 PROCEDURE — 84100 ASSAY OF PHOSPHORUS: CPT | Performed by: STUDENT IN AN ORGANIZED HEALTH CARE EDUCATION/TRAINING PROGRAM

## 2024-04-28 PROCEDURE — 94761 N-INVAS EAR/PLS OXIMETRY MLT: CPT | Mod: XB

## 2024-04-28 PROCEDURE — 25000003 PHARM REV CODE 250: Performed by: STUDENT IN AN ORGANIZED HEALTH CARE EDUCATION/TRAINING PROGRAM

## 2024-04-28 PROCEDURE — 27000207 HC ISOLATION

## 2024-04-28 PROCEDURE — 82803 BLOOD GASES ANY COMBINATION: CPT

## 2024-04-28 PROCEDURE — 99900035 HC TECH TIME PER 15 MIN (STAT)

## 2024-04-28 PROCEDURE — 63600175 PHARM REV CODE 636 W HCPCS: Performed by: STUDENT IN AN ORGANIZED HEALTH CARE EDUCATION/TRAINING PROGRAM

## 2024-04-28 PROCEDURE — 99233 SBSQ HOSP IP/OBS HIGH 50: CPT | Mod: ,,, | Performed by: INTERNAL MEDICINE

## 2024-04-28 PROCEDURE — 25000003 PHARM REV CODE 250: Performed by: INTERNAL MEDICINE

## 2024-04-28 PROCEDURE — 85025 COMPLETE CBC W/AUTO DIFF WBC: CPT | Performed by: STUDENT IN AN ORGANIZED HEALTH CARE EDUCATION/TRAINING PROGRAM

## 2024-04-28 PROCEDURE — 85610 PROTHROMBIN TIME: CPT | Performed by: STUDENT IN AN ORGANIZED HEALTH CARE EDUCATION/TRAINING PROGRAM

## 2024-04-28 RX ORDER — HYDRALAZINE HYDROCHLORIDE 50 MG/1
100 TABLET, FILM COATED ORAL EVERY 8 HOURS
Status: DISCONTINUED | OUTPATIENT
Start: 2024-04-28 | End: 2024-05-01 | Stop reason: HOSPADM

## 2024-04-28 RX ORDER — HYDRALAZINE HYDROCHLORIDE 20 MG/ML
10 INJECTION INTRAMUSCULAR; INTRAVENOUS ONCE
Status: COMPLETED | OUTPATIENT
Start: 2024-04-28 | End: 2024-04-28

## 2024-04-28 RX ORDER — MAGNESIUM SULFATE 1 G/100ML
1 INJECTION INTRAVENOUS ONCE
Status: COMPLETED | OUTPATIENT
Start: 2024-04-28 | End: 2024-04-28

## 2024-04-28 RX ADMIN — BUMETANIDE 4 MG: 1 TABLET ORAL at 04:04

## 2024-04-28 RX ADMIN — Medication 400 MG: at 09:04

## 2024-04-28 RX ADMIN — HYDRALAZINE HYDROCHLORIDE 10 MG: 20 INJECTION, SOLUTION INTRAMUSCULAR; INTRAVENOUS at 01:04

## 2024-04-28 RX ADMIN — MAGNESIUM SULFATE HEPTAHYDRATE 1 G: 500 INJECTION, SOLUTION INTRAMUSCULAR; INTRAVENOUS at 09:04

## 2024-04-28 RX ADMIN — GABAPENTIN 100 MG: 100 CAPSULE ORAL at 09:04

## 2024-04-28 RX ADMIN — AMIODARONE HYDROCHLORIDE 400 MG: 200 TABLET ORAL at 09:04

## 2024-04-28 RX ADMIN — SODIUM BICARBONATE 325 MG: 325 TABLET ORAL at 08:04

## 2024-04-28 RX ADMIN — TRAZODONE HYDROCHLORIDE 25 MG: 50 TABLET ORAL at 08:04

## 2024-04-28 RX ADMIN — SODIUM BICARBONATE 325 MG: 325 TABLET ORAL at 02:04

## 2024-04-28 RX ADMIN — HYDRALAZINE HYDROCHLORIDE 100 MG: 50 TABLET ORAL at 09:04

## 2024-04-28 RX ADMIN — BUMETANIDE 4 MG: 1 TABLET ORAL at 08:04

## 2024-04-28 RX ADMIN — SODIUM BICARBONATE 325 MG: 325 TABLET ORAL at 09:04

## 2024-04-28 RX ADMIN — AMPICILLIN 2 G: 2 INJECTION, POWDER, FOR SOLUTION INTRAMUSCULAR; INTRAVENOUS at 10:04

## 2024-04-28 RX ADMIN — VENLAFAXINE 37.5 MG: 37.5 TABLET ORAL at 09:04

## 2024-04-28 RX ADMIN — CEFTRIAXONE 2 G: 2 INJECTION, POWDER, FOR SOLUTION INTRAMUSCULAR; INTRAVENOUS at 11:04

## 2024-04-28 RX ADMIN — GABAPENTIN 100 MG: 100 CAPSULE ORAL at 08:04

## 2024-04-28 RX ADMIN — FAMOTIDINE 20 MG: 20 TABLET ORAL at 09:04

## 2024-04-28 RX ADMIN — ACETAMINOPHEN 1000 MG: 500 TABLET ORAL at 08:04

## 2024-04-28 RX ADMIN — WARFARIN SODIUM 2 MG: 2 TABLET ORAL at 04:04

## 2024-04-28 RX ADMIN — HYDRALAZINE HYDROCHLORIDE 100 MG: 50 TABLET ORAL at 02:04

## 2024-04-28 RX ADMIN — AMPICILLIN 2 G: 2 INJECTION, POWDER, FOR SOLUTION INTRAMUSCULAR; INTRAVENOUS at 08:04

## 2024-04-28 RX ADMIN — CEFTRIAXONE 2 G: 2 INJECTION, POWDER, FOR SOLUTION INTRAMUSCULAR; INTRAVENOUS at 09:04

## 2024-04-28 NOTE — NURSING
Trialysis dressing changed using sterile technique provided.  No active bleeding noted. Pt tolerated well.

## 2024-04-28 NOTE — PROGRESS NOTES
[facility] - Cardiology Stepdown  Heart Transplant  Progress Note    Patient Name: Radha Abbott  MRN: [id number]  Admission Date: 4/16/2024  Hospital Length of Stay: 12 days  Attending Physician: Brayan Ocampo MD  Primary Care Provider: Vasu Kong MD  Principal Problem:Pneumonia due to COVID-19 virus    Subjective:   Interval History:   NAEON. No acute complaints.    Continuous Infusions:  Current Facility-Administered Medications   Medication Dose Route Frequency Last Rate Last Admin     Scheduled Meds:  Current Facility-Administered Medications   Medication Dose Route Frequency    amiodarone  400 mg Oral Daily    ampicillin IV (PEDS and ADULTS)  2 g Intravenous Q12H    bumetanide  4 mg Oral BID loop    cefTRIAXone (Rocephin) IV (PEDS and ADULTS)  2 g Intravenous Q12H    famotidine  20 mg Oral Daily    gabapentin  100 mg Oral BID    hydrALAZINE  100 mg Oral Q8H    magnesium oxide  400 mg Oral Daily    magnesium sulfate IVPB  1 g Intravenous Once    sodium bicarbonate  325 mg Oral TID    traZODone  25 mg Oral QHS    venlafaxine  37.5 mg Oral Daily    warfarin  2 mg Oral Daily     PRN Meds:  Current Facility-Administered Medications:     acetaminophen, 1,000 mg, Oral, Q8H PRN    dextrose 10%, 12.5 g, Intravenous, PRN    dextrose 10%, 25 g, Intravenous, PRN    glucagon (human recombinant), 1 mg, Intramuscular, PRN    glucose, 16 g, Oral, PRN    glucose, 24 g, Oral, PRN    insulin aspart U-100, 0-5 Units, Subcutaneous, QID (AC + HS) PRN    senna-docusate 8.6-50 mg, 2 tablet, Oral, Daily PRN    Review of patient's allergies indicates:  No Known Allergies  Objective:     Vital Signs (Most Recent):  Temp: 98.8 °F (37.1 °C) (04/28/24 0456)  Pulse: 86 (04/28/24 0516)  Resp: 16 (04/28/24 0516)  BP: (!) 80/0 (04/28/24 0806)  SpO2: 99 % (04/28/24 0516) Vital Signs (24h Range):  Temp:  [97.1 °F (36.2 °C)-98.8 °F (37.1 °C)] 98.8 °F (37.1 °C)  Pulse:  [77-88] 86  Resp:  [16-19] 16  SpO2:  [96 %-100 %] 99 %  BP:  ()/(0-89) 80/0     Patient Vitals for the past 72 hrs (Last 3 readings):   Weight   04/28/24 0545 80.2 kg (176 lb 12.9 oz)   04/27/24 0505 80.4 kg (177 lb 4 oz)   04/26/24 0430 79.8 kg (175 lb 14.8 oz)     Body mass index is 22.7 kg/m².      Intake/Output Summary (Last 24 hours) at 4/28/2024 0905  Last data filed at 4/28/2024 0640  Gross per 24 hour   Intake 800 ml   Output 2076 ml   Net -1276 ml       Hemodynamic Parameters:  CVP:  [9 mmHg] 9 mmHg    Telemetry: SR with PVC's       Physical Exam  Constitutional:       Appearance: He is ill-appearing.   HENT:      Head: Normocephalic and atraumatic.   Eyes:      Conjunctiva/sclera: Conjunctivae normal.      Pupils: Pupils are equal, round, and reactive to light.   Neck:      Comments: JVP not visible on exam  Cardiovascular:      Rate and Rhythm: Normal rate and regular rhythm.      Comments: Smooth VAD hum  Pulmonary:      Effort: Pulmonary effort is normal.      Comments: Clear to auscultation  Abdominal:      General: Bowel sounds are normal.      Palpations: Abdomen is soft.   Musculoskeletal:         General: No swelling. Normal range of motion.      Cervical back: Normal range of motion and neck supple.   Skin:     General: Skin is warm and dry.      Capillary Refill: Capillary refill takes 2 to 3 seconds.   Neurological:      General: No focal deficit present.      Mental Status: He is alert and oriented to person, place, and time.            Significant Labs:  CBC:  Recent Labs   Lab 04/26/24  1728 04/27/24  0510 04/27/24  0520 04/28/24  0522   WBC 5.38  --  5.84 6.59   RBC 2.70*  --  2.56* 2.53*   HGB 7.7*  --  7.1* 6.9*   HCT 24.1* 24* 22.9* 22.6*     --  348 350   MCV 89  --  90 89   MCH 28.5  --  27.7 27.3   MCHC 32.0  --  31.0* 30.5*     BNP:  Recent Labs   Lab 04/22/24  0530 04/24/24  0425 04/26/24  0400   BNP 1,116* 1,263* 1,562*     CMP:  Recent Labs   Lab 04/22/24  0530 04/23/24  0422 04/24/24  0425 04/25/24  0432 04/26/24  0400  04/26/24  1423 04/27/24  0520 04/28/24  0522      < > 95   < > 103  --  107 79   CALCIUM 8.5*   < > 8.6*   < > 8.2*  --  8.5* 8.6*   ALBUMIN 2.5*  --  2.5*  --  2.4*  --   --   --    PROT 6.9  --  7.2  --  6.5  --   --   --    *   < > 135*   < > 138  --  138 138   K 3.5   < > 3.9   < > 3.9 3.8 4.2 4.0   CO2 21*   < > 22*   < > 24  --  24 23   CL 91*   < > 101   < > 105  --  104 103   *   < > 57*   < > 27*  --  30* 29*   CREATININE 8.6*   < > 5.6*   < > 3.2*  --  4.1* 4.2*   ALKPHOS 154*  --  201*  --  180*  --   --   --    *  --  186*  --  135*  --   --   --    *  --  140*  --  73*  --   --   --    BILITOT 0.2  --  0.2  --  0.2  --   --   --     < > = values in this interval not displayed.      Coagulation:   Recent Labs   Lab 04/26/24  0400 04/27/24 0520 04/28/24 0522   INR 2.4* 2.1* 1.9*   APTT 45.8* 41.7* 39.8*     LDH:  Recent Labs   Lab 04/26/24  0400 04/27/24 0520 04/28/24 0522   * 257 244     Microbiology:  Microbiology Results (last 7 days)       Procedure Component Value Units Date/Time    Blood culture [[id number]] Collected: 04/20/24 0234    Order Status: Completed Specimen: Blood Updated: 04/25/24 0612     Blood Culture, Routine No growth after 5 days.    Blood culture [[id number]] Collected: 04/20/24 0240    Order Status: Completed Specimen: Blood Updated: 04/25/24 0612     Blood Culture, Routine No growth after 5 days.    Blood culture [[id number]] Collected: 04/18/24 0534    Order Status: Completed Specimen: Blood Updated: 04/23/24 1012     Blood Culture, Routine No growth after 5 days.    Blood culture [[id number]] Collected: 04/18/24 0535    Order Status: Completed Specimen: Blood Updated: 04/23/24 1012     Blood Culture, Routine No growth after 5 days.            I have reviewed all pertinent labs within the past 24 hours.    Estimated Creatinine Clearance: 20.7 mL/min (A) (based on SCr of 4.2 mg/dL (H)).    Diagnostic Results:  I have reviewed and  interpreted all pertinent imaging results/findings within the past 24 hours.  Assessment and Plan:     Mr. Radha Abbott is a very pleasant 63 yo male with stage D HFrEF, ICMP with previous admission for ADHF/CS. He did undergo HM3 placement placed by Dr Washington on 12/1/23. Lengthy post op course complicated by vasoplegia(requiring Giaprezza), debility, malnutrition/impaired swallowing, and renal failure requiring HD(since weaned off). Once medically stable, he was transferred to Rehab for further PT/OT/ST. Of note, pt left with permacath still in place. He has done really well in rehab and now has continued to progress well at home. He is on a very high dose of Bumex 4 mg twice daily. VAD speed is at 5000 rpm. No VAD alarms noted on interrogation occasional PI events . Today he presents to the ED for infectious workup due to complaints of fevers of 101.5F associated with fatigue, generalized weakness, lower back pain and HA. He also reports a decline in appetite. Wife changes his driveline dressing every Saturday and has not noticed any purulance with dressing changes. Body aches/weakness started around 2am last night followed by fevers around 5pm. He has been taking tylenol which has helped brng down fevers but has not helped with HA. HA is 5/10 in intensity. He reports slight increase in body weight/ fluid retention yesterday for which he took a dose of Metolazone with a drop in weight by ~5lbs. Denies any SOB, LE swelling at this time.      2D Echo with CFD done on 3/26/2024  LVAD: There is a Heartmate III LVAD running at 5000 RPM. The aortic valve does not open. The ventricular septum is at midline. Inflow cannula well seated at the apex. Outflow graft not visualized.    Left Ventricle: The left ventricle is moderately dilated. Normal wall thickness. Global hypokinesis present. Septal motion is abnormal. There is severely reduced systolic function with a visually estimated ejection fraction of 5 - 10%. There is  diastolic dysfunction but grade cannot be determined.    Right Ventricle: Mild right ventricular enlargement. Wall thickness is normal. Right ventricle wall motion  is normal. Systolic function is normal. Pacemaker lead present in the ventricle.    Left Atrium: Left atrium is severely dilated.    Right Atrium: Right atrium is moderately dilated.    Mitral Valve: The mitral valve is repaired with an Noble stitch. There is mild regurgitation.    IVC/SVC: Normal venous pressure at 3 mmHg.    * Pneumonia due to COVID-19 virus  -4/16/24 he presented to the ED for infectious workup due to complaints of fevers of 101.5F associated with fatigue, generalized weakness, lower back pain and HA that started this morning. No evidence of drive line infection. No history of line infection.   - COVID +ve  - CT chest/ abdomen / pelvis 4/16 showed new RML opacity  - S/p remdesavir.   - continue tylenol for fevers    IVÁN (acute kidney injury)  Most likely 2/2 to ATN on CKD IV.      - As per nephrology, will decide on 4/29 whether patient will need HD moving forwards and will require a tunneled line placed.   - patient starting to produce urine. Creatinine stable.   - Will continue to monitor.  - Nephrology consulted. Appreciate recs.         Bacteremia  -blood cxs from 4/16 +ve for E faecalis   -ID following  -E faecalis is a known cause of endocarditis and AICD lead infections.   -TTE was negative for vegetation.  However TTE does not rule out valvular or lead vegetation. Will treat this as endocarditis with 6 weeks of IV antibiotics followed by suppressive therapy   -Currently on Ampicillin and Ceftriaxone for 6wk course as per ID.   -Repeat cultures from 4/20 are negative thus far    Acute on chronic combined systolic and diastolic heart failure  -ICM  -Last 2D Echo  4/18/24 : LVEF 10-15%, LVEDD  6.1 cm  -Euvolemic on examination today. Continue home bumex 4mg bid.   -GDMT with Hydralazine  -2g Na dietary restriction, 1500 mL  fluid restriction, strict I/Os      Anemia  - possibly secondary to dilutional anemia in the setting of hypervolemia.   - Will give IV lasix and recheck cbc.     Hyponatremia  -POA  -Daily labs       Hypertension  Continue hydralazine    Headache  C/o of HA on admit with no improvement despite tylenol  - CTH done 4/16 with no acute processes    LVAD (left ventricular assist device) present  -HeartMate 3 Implanted  12/1/2023  as DT  -Cont Coumadin, dosing by PharmD.  Goal INR 2.0-3.0 . Therapeutic today.   -Antiplatelets Not on ASA due to low H/H on implant   -LDH is stable overall today. Will continue to monitor daily.  -Speed set at  5000     -Interrogation notable for no events  -ECHO 4/18/24 with speed at 5000 EF: 10-15%, LVEDD: 6.1cm, RV size normal function mod reduced   -RHC 1/22/24 with RA 13, PCWP 22, PA 50/25, CO 5.8, CI 3.0   -Not listed for OHTx, declined for OHTX due to comorbidities     Procedure: Device Interrogation Including analysis of device parameters  Current Settings: Ventricular Assist Device  Review of device function is stable/unstable stable        4/28/2024     8:06 AM 4/28/2024     1:00 AM 4/27/2024     7:58 PM 4/27/2024     4:00 PM 4/27/2024    12:00 PM 4/27/2024     8:48 AM 4/27/2024     5:05 AM   TXP LVAD INTERROGATIONS   Type HeartMate3 HeartMate3 HeartMate3 HeartMate3 HeartMate3 HeartMate3 HeartMate3   Flow 4 3.6 3.8 4 4 3.9 3.6   Speed 5000 5000 5050 5000 5000 5000 5000   PI 3.9 6.4 5.8 4.3 4.2 4.4 5.8   Power ([place]) 3.5 3.3 3.3 3.5 3.4 3.4 3.3   LSL   4600 4600 4600 4600 4600   Pulsatility Pulse Pulse Pulse Pulse Pulse Pulse Pulse         Type 2 diabetes mellitus without complication, without long-term current use of insulin  -SSI    Ventricular tachycardia  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        Angela Shen MD  Heart Transplant  [facility] - Cardiology Stepdown

## 2024-04-28 NOTE — PROGRESS NOTES
04/28/24 0100 04/28/24 0103 04/28/24 0110   Vital Signs   /89 (!) 104/0 110/78   MAP (mmHg) 96  --  91   BP Location Left arm  --   --    BP Method Automatic Doppler Automatic   Patient Position Lying Lying  --      Patient has elevated BP, denies headache, denies other symptoms. Notified MD Glenna. New orders available. Will administer and continue to assess

## 2024-04-28 NOTE — NURSING
Lvad sterile dressing change completed per caregiver using silver kit, Caregiver noted minimal drainage on drainage sponge, no redness or tenderness at site. DLES rate 2. Pt tolerated well without any complaints.

## 2024-04-28 NOTE — PROGRESS NOTES
04/28/24 0100 04/28/24 0103 04/28/24 0110   Vital Signs   /89 (!) 104/0 110/78   MAP (mmHg) 96  --  91   BP Location Left arm  --   --    BP Method Automatic Doppler Automatic   Patient Position Lying Lying  --       04/28/24 0233 04/28/24 0234   Vital Signs   BP 94/75 (!) 90/0   MAP (mmHg) 81  --    BP Location Left arm Left arm   BP Method Automatic Doppler   Patient Position Lying Lying     Reassessed BP after 10 mg hydralazine IVP administered. BP now WNL. Patient denies any pain or discomfort. Will continue to monitor

## 2024-04-28 NOTE — PLAN OF CARE
PT is alert and oriented x 4. LVAD present and operating.  IV ABT in progress.  Airborne isolation maintained.  Pt remains free from falls, trauma, and injury.         Problem: Adult Inpatient Plan of Care  Goal: Plan of Care Review  Outcome: Progressing  Goal: Patient-Specific Goal (Individualized)  Outcome: Progressing  Goal: Absence of Hospital-Acquired Illness or Injury  Outcome: Progressing  Goal: Optimal Comfort and Wellbeing  Outcome: Progressing  Goal: Readiness for Transition of Care  Outcome: Progressing     Problem: Diabetes Comorbidity  Goal: Blood Glucose Level Within Targeted Range  Outcome: Progressing     Problem: Adjustment to Illness (Sepsis/Septic Shock)  Goal: Optimal Coping  Outcome: Progressing     Problem: Bleeding (Sepsis/Septic Shock)  Goal: Absence of Bleeding  Outcome: Progressing     Problem: Glycemic Control Impaired (Sepsis/Septic Shock)  Goal: Blood Glucose Level Within Desired Range  Outcome: Progressing     Problem: Infection Progression (Sepsis/Septic Shock)  Goal: Absence of Infection Signs and Symptoms  Outcome: Progressing     Problem: Nutrition Impaired (Sepsis/Septic Shock)  Goal: Optimal Nutrition Intake  Outcome: Progressing     Problem: Skin Injury Risk Increased  Goal: Skin Health and Integrity  Outcome: Progressing     Problem: Fluid Imbalance (Pneumonia)  Goal: Fluid Balance  Outcome: Progressing     Problem: Infection (Pneumonia)  Goal: Resolution of Infection Signs and Symptoms  Outcome: Progressing     Problem: Respiratory Compromise (Pneumonia)  Goal: Effective Oxygenation and Ventilation  Outcome: Progressing     Problem: Fall Injury Risk  Goal: Absence of Fall and Fall-Related Injury  Outcome: Progressing     Problem: Fluid and Electrolyte Imbalance (Acute Kidney Injury/Impairment)  Goal: Fluid and Electrolyte Balance  Outcome: Progressing     Problem: Oral Intake Inadequate (Acute Kidney Injury/Impairment)  Goal: Optimal Nutrition Intake  Outcome:  Progressing     Problem: Renal Function Impairment (Acute Kidney Injury/Impairment)  Goal: Effective Renal Function  Outcome: Progressing     Problem: Infection  Goal: Absence of Infection Signs and Symptoms  Outcome: Progressing     Problem: Device-Related Complication Risk (Hemodialysis)  Goal: Safe, Effective Therapy Delivery  Outcome: Progressing     Problem: Hemodynamic Instability (Hemodialysis)  Goal: Effective Tissue Perfusion  Outcome: Progressing     Problem: Infection (Hemodialysis)  Goal: Absence of Infection Signs and Symptoms  Outcome: Progressing     Problem: Adjustment to Device (Ventricular Assist Device)  Goal: Optimal Adjustment to Device  Outcome: Progressing     Problem: Bleeding (Ventricular Assist Device)  Goal: Absence of Bleeding  Outcome: Progressing     Problem: Embolism (Ventricular Assist Device)  Goal: Absence of Embolism Signs and Symptoms  Outcome: Progressing     Problem: Hemodynamic Instability (Ventricular Assist Device)  Goal: Optimal Blood Flow  Outcome: Progressing     Problem: Infection (Ventricular Assist Device)  Goal: Absence of Infection Signs and Symptoms  Outcome: Progressing     Problem: Right-Sided Heart Compromise (Ventricular Assist Device)  Goal: Effective Right-Sided Heart Function  Outcome: Progressing

## 2024-04-28 NOTE — PROGRESS NOTES
04/28/2024  [place] Duarte    Current provider:  Brayan Ocampo MD    Device interrogation:      4/28/2024     8:06 AM 4/28/2024     1:00 AM 4/27/2024     7:58 PM 4/27/2024     4:00 PM 4/27/2024    12:00 PM 4/27/2024     8:48 AM 4/27/2024     5:05 AM   TXP LVAD INTERROGATIONS   Type HeartMate3 HeartMate3 HeartMate3 HeartMate3 HeartMate3 HeartMate3 HeartMate3   Flow 4 3.6 3.8 4 4 3.9 3.6   Speed 5000 5000 5050 5000 5000 5000 5000   PI 3.9 6.4 5.8 4.3 4.2 4.4 5.8   Power ([place]) 3.5 3.3 3.3 3.5 3.4 3.4 3.3   LSL   4600 4600 4600 4600 4600   Pulsatility Pulse Pulse Pulse Pulse Pulse Pulse Pulse          Rounded on [facility] [employer] to ensure all mechanical assist device settings (IABP or VAD) were appropriate and all parameters were within limits.  I was able to ensure all back up equipment was present, the staff had no issues, and the Perfusion Department daily rounding was complete.      For implantable VADs: Interrogation of Ventricular assist device was performed with analysis of device parameters and review of device function. I have personally reviewed the interrogation findings and agree with findings as stated.     In emergency, the nursing units have been notified to contact the perfusion department either by:  Calling [phone] from 630am to 4pm Mon thru Fri, utilizing the On-Call Finder functionality of Epic and searching for Perfusion, or by contacting the hospital  from 4pm to 630am and on weekends and asking to speak with the perfusionist on call.    9:02 AM

## 2024-04-28 NOTE — PLAN OF CARE
PT is alert and oriented x 4. LVAD present and operating.  Airborne isolation maintained.  Pt remains free from falls, trauma, and injury.

## 2024-04-28 NOTE — CARE UPDATE
Care Update:     No acute events overnight. Patient in room 311/311 A. Blood glucose stable on current inpatient regimen.No hypoglycemia noted over the past 24-hours. Endocrine will continue to follow and manage insulin orders inpatient.       Steroid use- None.   Renal function-   Lab Results   Component Value Date    CREATININE 4.2 (H) 04/28/2024        Diet Renal Coumadin Restriction; Fluid - 1500mL; Isolation Tray - Regular [place]     POCT Glucose   Date Value Ref Range Status   04/28/2024 138 (H) 70 - 110 mg/dL Final   04/27/2024 200 (H) 70 - 110 mg/dL Final   04/27/2024 173 (H) 70 - 110 mg/dL Final   04/27/2024 200 (H) 70 - 110 mg/dL Final   04/27/2024 127 (H) 70 - 110 mg/dL Final   04/26/2024 199 (H) 70 - 110 mg/dL Final   04/26/2024 158 (H) 70 - 110 mg/dL Final   04/26/2024 180 (H) 70 - 110 mg/dL Final   04/26/2024 222 (H) 70 - 110 mg/dL Final   04/26/2024 193 (H) 70 - 110 mg/dL Final   04/25/2024 181 (H) 70 - 110 mg/dL Final   04/25/2024 365 (H) 70 - 110 mg/dL Final   04/25/2024 132 (H) 70 - 110 mg/dL Final     Lab Results   Component Value Date    HGBA1C 7.4 (H) 04/17/2024     SSI not being given.    Endocrine  Type 2 diabetes mellitus without complication, without long-term current use of insulin  Endocrinology consulted for BG management.   BG goal 140-180     - Novolog (Insulin Aspart) prn for BG excursions [facility] SSI (150/50)  - BG checks AC/HS  - Hypoglycemia protocol in place     ** Please notify Endocrine for any change and/or advance in diet**  ** Please call Endocrine for any BG related issues **     Discharge Planning:   TBD. Please notify endocrinology prior to discharge.

## 2024-04-28 NOTE — SUBJECTIVE & OBJECTIVE
Interval History:   NAEON. No acute complaints.    Continuous Infusions:  Current Facility-Administered Medications   Medication Dose Route Frequency Last Rate Last Admin     Scheduled Meds:  Current Facility-Administered Medications   Medication Dose Route Frequency    amiodarone  400 mg Oral Daily    ampicillin IV (PEDS and ADULTS)  2 g Intravenous Q12H    bumetanide  4 mg Oral BID loop    cefTRIAXone (Rocephin) IV (PEDS and ADULTS)  2 g Intravenous Q12H    famotidine  20 mg Oral Daily    gabapentin  100 mg Oral BID    hydrALAZINE  100 mg Oral Q8H    magnesium oxide  400 mg Oral Daily    magnesium sulfate IVPB  1 g Intravenous Once    sodium bicarbonate  325 mg Oral TID    traZODone  25 mg Oral QHS    venlafaxine  37.5 mg Oral Daily    warfarin  2 mg Oral Daily     PRN Meds:  Current Facility-Administered Medications:     acetaminophen, 1,000 mg, Oral, Q8H PRN    dextrose 10%, 12.5 g, Intravenous, PRN    dextrose 10%, 25 g, Intravenous, PRN    glucagon (human recombinant), 1 mg, Intramuscular, PRN    glucose, 16 g, Oral, PRN    glucose, 24 g, Oral, PRN    insulin aspart U-100, 0-5 Units, Subcutaneous, QID (AC + HS) PRN    senna-docusate 8.6-50 mg, 2 tablet, Oral, Daily PRN    Review of patient's allergies indicates:  No Known Allergies  Objective:     Vital Signs (Most Recent):  Temp: 98.8 °F (37.1 °C) (04/28/24 0456)  Pulse: 86 (04/28/24 0516)  Resp: 16 (04/28/24 0516)  BP: (!) 80/0 (04/28/24 0806)  SpO2: 99 % (04/28/24 0516) Vital Signs (24h Range):  Temp:  [97.1 °F (36.2 °C)-98.8 °F (37.1 °C)] 98.8 °F (37.1 °C)  Pulse:  [77-88] 86  Resp:  [16-19] 16  SpO2:  [96 %-100 %] 99 %  BP: ()/(0-89) 80/0     Patient Vitals for the past 72 hrs (Last 3 readings):   Weight   04/28/24 0545 80.2 kg (176 lb 12.9 oz)   04/27/24 0505 80.4 kg (177 lb 4 oz)   04/26/24 0430 79.8 kg (175 lb 14.8 oz)     Body mass index is 22.7 kg/m².      Intake/Output Summary (Last 24 hours) at 4/28/2024 0905  Last data filed at 4/28/2024  0640  Gross per 24 hour   Intake 800 ml   Output 2076 ml   Net -1276 ml       Hemodynamic Parameters:  CVP:  [9 mmHg] 9 mmHg    Telemetry: SR with PVC's       Physical Exam  Constitutional:       Appearance: He is ill-appearing.   HENT:      Head: Normocephalic and atraumatic.   Eyes:      Conjunctiva/sclera: Conjunctivae normal.      Pupils: Pupils are equal, round, and reactive to light.   Neck:      Comments: JVP not visible on exam  Cardiovascular:      Rate and Rhythm: Normal rate and regular rhythm.      Comments: Smooth VAD hum  Pulmonary:      Effort: Pulmonary effort is normal.      Comments: Clear to auscultation  Abdominal:      General: Bowel sounds are normal.      Palpations: Abdomen is soft.   Musculoskeletal:         General: No swelling. Normal range of motion.      Cervical back: Normal range of motion and neck supple.   Skin:     General: Skin is warm and dry.      Capillary Refill: Capillary refill takes 2 to 3 seconds.   Neurological:      General: No focal deficit present.      Mental Status: He is alert and oriented to person, place, and time.            Significant Labs:  CBC:  Recent Labs   Lab 04/26/24  1728 04/27/24  0510 04/27/24  0520 04/28/24  0522   WBC 5.38  --  5.84 6.59   RBC 2.70*  --  2.56* 2.53*   HGB 7.7*  --  7.1* 6.9*   HCT 24.1* 24* 22.9* 22.6*     --  348 350   MCV 89  --  90 89   MCH 28.5  --  27.7 27.3   MCHC 32.0  --  31.0* 30.5*     BNP:  Recent Labs   Lab 04/22/24  0530 04/24/24  0425 04/26/24  0400   BNP 1,116* 1,263* 1,562*     CMP:  Recent Labs   Lab 04/22/24  0530 04/23/24  0422 04/24/24  0425 04/25/24  0432 04/26/24  0400 04/26/24  1423 04/27/24  0520 04/28/24  0522      < > 95   < > 103  --  107 79   CALCIUM 8.5*   < > 8.6*   < > 8.2*  --  8.5* 8.6*   ALBUMIN 2.5*  --  2.5*  --  2.4*  --   --   --    PROT 6.9  --  7.2  --  6.5  --   --   --    *   < > 135*   < > 138  --  138 138   K 3.5   < > 3.9   < > 3.9 3.8 4.2 4.0   CO2 21*   < > 22*   < >  24  --  24 23   CL 91*   < > 101   < > 105  --  104 103   *   < > 57*   < > 27*  --  30* 29*   CREATININE 8.6*   < > 5.6*   < > 3.2*  --  4.1* 4.2*   ALKPHOS 154*  --  201*  --  180*  --   --   --    *  --  186*  --  135*  --   --   --    *  --  140*  --  73*  --   --   --    BILITOT 0.2  --  0.2  --  0.2  --   --   --     < > = values in this interval not displayed.      Coagulation:   Recent Labs   Lab 04/26/24 0400 04/27/24 0520 04/28/24 0522   INR 2.4* 2.1* 1.9*   APTT 45.8* 41.7* 39.8*     LDH:  Recent Labs   Lab 04/26/24 0400 04/27/24 0520 04/28/24 0522   * 257 244     Microbiology:  Microbiology Results (last 7 days)       Procedure Component Value Units Date/Time    Blood culture [[id number]] Collected: 04/20/24 0234    Order Status: Completed Specimen: Blood Updated: 04/25/24 0612     Blood Culture, Routine No growth after 5 days.    Blood culture [[id number]] Collected: 04/20/24 0240    Order Status: Completed Specimen: Blood Updated: 04/25/24 0612     Blood Culture, Routine No growth after 5 days.    Blood culture [[id number]] Collected: 04/18/24 0534    Order Status: Completed Specimen: Blood Updated: 04/23/24 1012     Blood Culture, Routine No growth after 5 days.    Blood culture [[id number]] Collected: 04/18/24 0535    Order Status: Completed Specimen: Blood Updated: 04/23/24 1012     Blood Culture, Routine No growth after 5 days.            I have reviewed all pertinent labs within the past 24 hours.    Estimated Creatinine Clearance: 20.7 mL/min (A) (based on SCr of 4.2 mg/dL (H)).    Diagnostic Results:  I have reviewed and interpreted all pertinent imaging results/findings within the past 24 hours.

## 2024-04-29 LAB
ALBUMIN SERPL BCP-MCNC: 2.5 G/DL (ref 3.5–5.2)
ALLENS TEST: ABNORMAL
ALP SERPL-CCNC: 146 U/L (ref 55–135)
ALT SERPL W/O P-5'-P-CCNC: 63 U/L (ref 10–44)
ANION GAP SERPL CALC-SCNC: 9 MMOL/L (ref 8–16)
APTT PPP: 39.5 SEC (ref 21–32)
AST SERPL-CCNC: 22 U/L (ref 10–40)
BASOPHILS # BLD AUTO: 0.03 K/UL (ref 0–0.2)
BASOPHILS NFR BLD: 0.5 % (ref 0–1.9)
BILIRUB DIRECT SERPL-MCNC: 0.1 MG/DL (ref 0.1–0.3)
BILIRUB SERPL-MCNC: 0.2 MG/DL (ref 0.1–1)
BNP SERPL-MCNC: 858 PG/ML (ref 0–99)
BUN SERPL-MCNC: 33 MG/DL (ref 8–23)
CALCIUM SERPL-MCNC: 8.4 MG/DL (ref 8.7–10.5)
CHLORIDE SERPL-SCNC: 104 MMOL/L (ref 95–110)
CO2 SERPL-SCNC: 25 MMOL/L (ref 23–29)
CREAT SERPL-MCNC: 4.1 MG/DL (ref 0.5–1.4)
CRP SERPL-MCNC: 21.3 MG/L (ref 0–8.2)
DELSYS: ABNORMAL
DIFFERENTIAL METHOD BLD: ABNORMAL
EOSINOPHIL # BLD AUTO: 0.2 K/UL (ref 0–0.5)
EOSINOPHIL NFR BLD: 2.9 % (ref 0–8)
ERYTHROCYTE [DISTWIDTH] IN BLOOD BY AUTOMATED COUNT: 17.9 % (ref 11.5–14.5)
EST. GFR  (NO RACE VARIABLE): 15.7 ML/MIN/1.73 M^2
FIO2: 21
GLUCOSE SERPL-MCNC: 110 MG/DL (ref 70–110)
HCT VFR BLD AUTO: 20.4 % (ref 40–54)
HCT VFR BLD CALC: 40 %PCV (ref 36–54)
HGB BLD-MCNC: 6.3 G/DL (ref 14–18)
IMM GRANULOCYTES # BLD AUTO: 0.06 K/UL (ref 0–0.04)
IMM GRANULOCYTES NFR BLD AUTO: 0.9 % (ref 0–0.5)
INR PPP: 1.9 (ref 0.8–1.2)
LDH SERPL L TO P-CCNC: 229 U/L (ref 110–260)
LYMPHOCYTES # BLD AUTO: 0.8 K/UL (ref 1–4.8)
LYMPHOCYTES NFR BLD: 12.4 % (ref 18–48)
MAGNESIUM SERPL-MCNC: 1.8 MG/DL (ref 1.6–2.6)
MCH RBC QN AUTO: 27.8 PG (ref 27–31)
MCHC RBC AUTO-ENTMCNC: 30.9 G/DL (ref 32–36)
MCV RBC AUTO: 90 FL (ref 82–98)
MODE: ABNORMAL
MONOCYTES # BLD AUTO: 0.7 K/UL (ref 0.3–1)
MONOCYTES NFR BLD: 10.6 % (ref 4–15)
NEUTROPHILS # BLD AUTO: 4.8 K/UL (ref 1.8–7.7)
NEUTROPHILS NFR BLD: 72.7 % (ref 38–73)
NRBC BLD-RTO: 0 /100 WBC
PCO2 BLDA: 48.7 MMHG (ref 35–45)
PH SMN: 7.36 [PH] (ref 7.35–7.45)
PHOSPHATE SERPL-MCNC: 3.5 MG/DL (ref 2.7–4.5)
PLATELET # BLD AUTO: 308 K/UL (ref 150–450)
PMV BLD AUTO: 11 FL (ref 9.2–12.9)
PO2 BLDA: 33 MMHG (ref 40–60)
POC BE: 2 MMOL/L
POC SATURATED O2: 60 % (ref 95–100)
POCT GLUCOSE: 109 MG/DL (ref 70–110)
POCT GLUCOSE: 183 MG/DL (ref 70–110)
POCT GLUCOSE: 291 MG/DL (ref 70–110)
POCT GLUCOSE: 96 MG/DL (ref 70–110)
POTASSIUM SERPL-SCNC: 3.7 MMOL/L (ref 3.5–5.1)
PREALB SERPL-MCNC: 30 MG/DL (ref 20–43)
PROT SERPL-MCNC: 6.4 G/DL (ref 6–8.4)
PROTHROMBIN TIME: 19.8 SEC (ref 9–12.5)
RBC # BLD AUTO: 2.27 M/UL (ref 4.6–6.2)
SAMPLE: ABNORMAL
SITE: ABNORMAL
SODIUM SERPL-SCNC: 138 MMOL/L (ref 136–145)
SP02: 95
WBC # BLD AUTO: 6.6 K/UL (ref 3.9–12.7)

## 2024-04-29 PROCEDURE — 25000003 PHARM REV CODE 250: Performed by: INTERNAL MEDICINE

## 2024-04-29 PROCEDURE — 83615 LACTATE (LD) (LDH) ENZYME: CPT | Performed by: STUDENT IN AN ORGANIZED HEALTH CARE EDUCATION/TRAINING PROGRAM

## 2024-04-29 PROCEDURE — 82803 BLOOD GASES ANY COMBINATION: CPT

## 2024-04-29 PROCEDURE — 84134 ASSAY OF PREALBUMIN: CPT | Performed by: STUDENT IN AN ORGANIZED HEALTH CARE EDUCATION/TRAINING PROGRAM

## 2024-04-29 PROCEDURE — 80048 BASIC METABOLIC PNL TOTAL CA: CPT | Performed by: STUDENT IN AN ORGANIZED HEALTH CARE EDUCATION/TRAINING PROGRAM

## 2024-04-29 PROCEDURE — 27000207 HC ISOLATION

## 2024-04-29 PROCEDURE — 99232 SBSQ HOSP IP/OBS MODERATE 35: CPT | Mod: ,,, | Performed by: HOSPITALIST

## 2024-04-29 PROCEDURE — 80076 HEPATIC FUNCTION PANEL: CPT | Performed by: STUDENT IN AN ORGANIZED HEALTH CARE EDUCATION/TRAINING PROGRAM

## 2024-04-29 PROCEDURE — 85025 COMPLETE CBC W/AUTO DIFF WBC: CPT | Performed by: STUDENT IN AN ORGANIZED HEALTH CARE EDUCATION/TRAINING PROGRAM

## 2024-04-29 PROCEDURE — 25000003 PHARM REV CODE 250: Performed by: STUDENT IN AN ORGANIZED HEALTH CARE EDUCATION/TRAINING PROGRAM

## 2024-04-29 PROCEDURE — 83735 ASSAY OF MAGNESIUM: CPT | Performed by: STUDENT IN AN ORGANIZED HEALTH CARE EDUCATION/TRAINING PROGRAM

## 2024-04-29 PROCEDURE — 27000923 HC TRIALYSIS CATHETER, ANY SIZE

## 2024-04-29 PROCEDURE — 94761 N-INVAS EAR/PLS OXIMETRY MLT: CPT | Mod: XB

## 2024-04-29 PROCEDURE — 84100 ASSAY OF PHOSPHORUS: CPT | Performed by: STUDENT IN AN ORGANIZED HEALTH CARE EDUCATION/TRAINING PROGRAM

## 2024-04-29 PROCEDURE — 86140 C-REACTIVE PROTEIN: CPT | Performed by: STUDENT IN AN ORGANIZED HEALTH CARE EDUCATION/TRAINING PROGRAM

## 2024-04-29 PROCEDURE — 27000248 HC VAD-ADDITIONAL DAY

## 2024-04-29 PROCEDURE — 85610 PROTHROMBIN TIME: CPT | Performed by: STUDENT IN AN ORGANIZED HEALTH CARE EDUCATION/TRAINING PROGRAM

## 2024-04-29 PROCEDURE — 93750 INTERROGATION VAD IN PERSON: CPT | Mod: ,,, | Performed by: INTERNAL MEDICINE

## 2024-04-29 PROCEDURE — 83880 ASSAY OF NATRIURETIC PEPTIDE: CPT | Performed by: STUDENT IN AN ORGANIZED HEALTH CARE EDUCATION/TRAINING PROGRAM

## 2024-04-29 PROCEDURE — 99900035 HC TECH TIME PER 15 MIN (STAT)

## 2024-04-29 PROCEDURE — 85730 THROMBOPLASTIN TIME PARTIAL: CPT | Performed by: STUDENT IN AN ORGANIZED HEALTH CARE EDUCATION/TRAINING PROGRAM

## 2024-04-29 PROCEDURE — 20600001 HC STEP DOWN PRIVATE ROOM

## 2024-04-29 PROCEDURE — 99233 SBSQ HOSP IP/OBS HIGH 50: CPT | Mod: ,,, | Performed by: PHYSICIAN ASSISTANT

## 2024-04-29 PROCEDURE — 63600175 PHARM REV CODE 636 W HCPCS: Performed by: INTERNAL MEDICINE

## 2024-04-29 RX ORDER — WARFARIN 2.5 MG/1
2.5 TABLET ORAL
Status: DISCONTINUED | OUTPATIENT
Start: 2024-04-29 | End: 2024-05-01 | Stop reason: HOSPADM

## 2024-04-29 RX ADMIN — SODIUM BICARBONATE 325 MG: 325 TABLET ORAL at 08:04

## 2024-04-29 RX ADMIN — SODIUM BICARBONATE 325 MG: 325 TABLET ORAL at 04:04

## 2024-04-29 RX ADMIN — GABAPENTIN 100 MG: 100 CAPSULE ORAL at 10:04

## 2024-04-29 RX ADMIN — AMIODARONE HYDROCHLORIDE 400 MG: 200 TABLET ORAL at 10:04

## 2024-04-29 RX ADMIN — WARFARIN SODIUM 2.5 MG: 2.5 TABLET ORAL at 04:04

## 2024-04-29 RX ADMIN — VENLAFAXINE 37.5 MG: 37.5 TABLET ORAL at 10:04

## 2024-04-29 RX ADMIN — HYDRALAZINE HYDROCHLORIDE 100 MG: 50 TABLET ORAL at 08:04

## 2024-04-29 RX ADMIN — HYDRALAZINE HYDROCHLORIDE 100 MG: 50 TABLET ORAL at 05:04

## 2024-04-29 RX ADMIN — HYDRALAZINE HYDROCHLORIDE 100 MG: 50 TABLET ORAL at 04:04

## 2024-04-29 RX ADMIN — AMPICILLIN 2 G: 2 INJECTION, POWDER, FOR SOLUTION INTRAMUSCULAR; INTRAVENOUS at 08:04

## 2024-04-29 RX ADMIN — CEFTRIAXONE 2 G: 2 INJECTION, POWDER, FOR SOLUTION INTRAMUSCULAR; INTRAVENOUS at 10:04

## 2024-04-29 RX ADMIN — BUMETANIDE 4 MG: 1 TABLET ORAL at 10:04

## 2024-04-29 RX ADMIN — FAMOTIDINE 20 MG: 20 TABLET ORAL at 10:04

## 2024-04-29 RX ADMIN — BUMETANIDE 4 MG: 1 TABLET ORAL at 04:04

## 2024-04-29 RX ADMIN — AMPICILLIN 2 G: 2 INJECTION, POWDER, FOR SOLUTION INTRAMUSCULAR; INTRAVENOUS at 10:04

## 2024-04-29 RX ADMIN — TRAZODONE HYDROCHLORIDE 25 MG: 50 TABLET ORAL at 08:04

## 2024-04-29 RX ADMIN — SODIUM BICARBONATE 325 MG: 325 TABLET ORAL at 10:04

## 2024-04-29 RX ADMIN — GABAPENTIN 100 MG: 100 CAPSULE ORAL at 08:04

## 2024-04-29 RX ADMIN — Medication 400 MG: at 10:04

## 2024-04-29 NOTE — PLAN OF CARE
Pt maintained free from falls/trauma/injuries and skin breakdown. HM 3 with speed set to 5000, no alarms activated throughout shift. LVAD hum smooth on auscultation. Dopplers and MAP have been wnl. Continues to be treated with IV antibiotics and tolerating well. RIJ trialysis catheter patent and dressing CDI. NPO since midnight.  Patient  denied pain or discomfort. Plan of care reviewed. Pt verbalized understanding. All questions and concerns addressed.      Problem: Adult Inpatient Plan of Care  Goal: Plan of Care Review  Outcome: Progressing  Goal: Patient-Specific Goal (Individualized)  Outcome: Progressing  Goal: Absence of Hospital-Acquired Illness or Injury  Outcome: Progressing  Goal: Optimal Comfort and Wellbeing  Outcome: Progressing  Goal: Readiness for Transition of Care  Outcome: Progressing     Problem: Diabetes Comorbidity  Goal: Blood Glucose Level Within Targeted Range  Outcome: Progressing     Problem: Adjustment to Illness (Sepsis/Septic Shock)  Goal: Optimal Coping  Outcome: Progressing     Problem: Bleeding (Sepsis/Septic Shock)  Goal: Absence of Bleeding  Outcome: Progressing     Problem: Glycemic Control Impaired (Sepsis/Septic Shock)  Goal: Blood Glucose Level Within Desired Range  Outcome: Progressing     Problem: Infection Progression (Sepsis/Septic Shock)  Goal: Absence of Infection Signs and Symptoms  Outcome: Progressing     Problem: Nutrition Impaired (Sepsis/Septic Shock)  Goal: Optimal Nutrition Intake  Outcome: Progressing     Problem: Skin Injury Risk Increased  Goal: Skin Health and Integrity  Outcome: Progressing     Problem: Fluid Imbalance (Pneumonia)  Goal: Fluid Balance  Outcome: Progressing     Problem: Infection (Pneumonia)  Goal: Resolution of Infection Signs and Symptoms  Outcome: Progressing     Problem: Respiratory Compromise (Pneumonia)  Goal: Effective Oxygenation and Ventilation  Outcome: Progressing     Problem: Fall Injury Risk  Goal: Absence of Fall and Fall-Related  Injury  Outcome: Progressing     Problem: Fluid and Electrolyte Imbalance (Acute Kidney Injury/Impairment)  Goal: Fluid and Electrolyte Balance  Outcome: Progressing     Problem: Infection  Goal: Absence of Infection Signs and Symptoms  Outcome: Progressing     Problem: Hemodynamic Instability (Hemodialysis)  Goal: Effective Tissue Perfusion  Outcome: Progressing     Problem: Adjustment to Device (Ventricular Assist Device)  Goal: Optimal Adjustment to Device  Outcome: Progressing

## 2024-04-29 NOTE — CARE UPDATE
Care Update:     No acute events overnight. Patient in room 311/311 A. Blood glucose stable on current inpatient regimen.No hypoglycemia noted over the past 24-hours. Endocrine will continue to follow and manage insulin orders inpatient.       Steroid use- None.   Renal function-   Lab Results   Component Value Date    CREATININE 4.1 (H) 04/29/2024        Diet NPO Except for: Medication     POCT Glucose   Date Value Ref Range Status   04/29/2024 96 70 - 110 mg/dL Final   04/28/2024 199 (H) 70 - 110 mg/dL Final   04/28/2024 184 (H) 70 - 110 mg/dL Final   04/28/2024 138 (H) 70 - 110 mg/dL Final   04/27/2024 200 (H) 70 - 110 mg/dL Final   04/27/2024 173 (H) 70 - 110 mg/dL Final   04/27/2024 200 (H) 70 - 110 mg/dL Final   04/27/2024 127 (H) 70 - 110 mg/dL Final   04/26/2024 199 (H) 70 - 110 mg/dL Final   04/26/2024 158 (H) 70 - 110 mg/dL Final   04/26/2024 180 (H) 70 - 110 mg/dL Final   04/26/2024 222 (H) 70 - 110 mg/dL Final     Lab Results   Component Value Date    HGBA1C 7.4 (H) 04/17/2024       Endocrine  Type 2 diabetes mellitus without complication, without long-term current use of insulin  Endocrinology consulted for BG management.   BG goal 140-180     - Novolog (Insulin Aspart) prn for BG excursions [facility] SSI (150/50)  - BG checks AC/HS  - Hypoglycemia protocol in place     ** Please notify Endocrine for any change and/or advance in diet**  ** Please call Endocrine for any BG related issues **     Discharge Planning:   TBD. Please notify endocrinology prior to discharge.

## 2024-04-29 NOTE — PT/OT/SLP PROGRESS
Physical Therapy Discharge Note      Patient Name:  Radha Abbott   MRN:  [id number]    Patient not seen today secondary to reports preparing for surgery and doesn't feel like walking right now. Pt reports he has been walking around his room with/without RW steady with no LOB. Pt denies SOB with activity. Pt reports he is almost at his baseline PLOF and does not feel like he needs further therapy services. This PT encouraged pt to go to outpatient PT as planned before this admission for advanced gait, balance and endurance training. PT to sign off at this time. Please re-consult if change in functional status. No billable units this date.

## 2024-04-29 NOTE — PLAN OF CARE
Recommendations    1.) Recommend continuing with Renal/Coumadin diet, fluid per MD.                  - consider liberalizing diet as renal labs improve, with goal of Cardiac/Coumadin diet.      2.) If PO intake <50%, recommend Boost Plus BID to help meet increased needs.      3.) RD to monitor.     Goals: to meet % of EEN/EPN by next RD f/u  Nutrition Goal Status: goal not met (NPO since midnight)  Communication of RD Recs:  (POC)

## 2024-04-29 NOTE — PROGRESS NOTES
04/29/2024  Erasmo Hooper    Current provider:  Brayan Ocampo MD    Device interrogation:      4/29/2024     8:34 AM 4/29/2024     4:17 AM 4/29/2024     1:00 AM 4/28/2024     7:49 PM 4/28/2024     4:47 PM 4/28/2024    11:31 AM 4/28/2024     8:06 AM   TXP LVAD INTERROGATIONS   Type HeartMate3 HeartMate3 HeartMate3 HeartMate3 HeartMate3 HeartMate3 HeartMate3   Flow 3.8 4.1 3.9 3.8 3.6 3.9 4   Speed 5000 5000 5000 5000 5000 5000 5000   PI 4.8 4.1 3.6 5.3 6.2 3.9 3.9   Power ([place]) 3.4 3.4 3.4 3.5 3.4 3.4 3.5   LSL  4600        Pulsatility Pulse Pulse Pulse Pulse Pulse Pulse Pulse          Rounded on [facility] [employer] to ensure all mechanical assist device settings (IABP or VAD) were appropriate and all parameters were within limits.  I was able to ensure all back up equipment was present, the staff had no issues, and the Perfusion Department daily rounding was complete.      For implantable VADs: Interrogation of Ventricular assist device was performed with analysis of device parameters and review of device function. I have personally reviewed the interrogation findings and agree with findings as stated.     In emergency, the nursing units have been notified to contact the perfusion department either by:  Calling [phone] from 630am to 4pm Mon thru Fri, utilizing the On-Call Finder functionality of Epic and searching for Perfusion, or by contacting the hospital  from 4pm to 630am and on weekends and asking to speak with the perfusionist on call.    8:59 AM

## 2024-04-29 NOTE — PROGRESS NOTES
[facility]  Nephrology  Progress Note    Patient Name: Radha Abbott  MRN: [id number]  Admission Date: 4/16/2024  Hospital Length of Stay: 13 days  Attending Provider: Sandhya Price MD   Primary Care Physician: Vasu Kong MD  Principal Problem:Pneumonia due to COVID-19 virus    Subjective:     HPI:  65 yo male with stage D HFrEF, ICMP with previous admission for ADHF/CS. S/p LVAD placement 12/2023 Lengthy post op course complicated by vasoplegia(requiring Giaprezza), debility, malnutrition/impaired swallowing, and renal failure requiring HD(since weaned off). presents to the ED for infectious workup due to complaints of fevers of 101.5F. Patient receiving abx, for presumed endocarditis/bactermia. Hospital course complicated by covid positive and oliguric IVÁN.     Concerning renal hx, patient following with [facility] nephrology Dr. Mendez in nephrology clinic. Baseline Scr ~4 with CKD IV/V, poor reserve.     Nephrology consulted for IVÁN    Interval History:   No acute events overnight, UOP ~500ml, Scr plateau at 4.1, no swelling in extremities, on room air.    Review of patient's allergies indicates:  No Known Allergies  Current Facility-Administered Medications   Medication Dose Route Frequency Provider Last Rate Last Admin    acetaminophen tablet 1,000 mg  1,000 mg Oral Q8H PRN Lucy Brand MD   1,000 mg at 04/28/24 2038    amiodarone tablet 400 mg  400 mg Oral Daily Lucy Brand MD   400 mg at 04/29/24 1002    ampicillin (OMNIPEN) 2 g in sodium chloride 0.9 % 100 mL IVPB (MB+)  2 g Intravenous Q12H Axel Caban MD   Stopped at 04/29/24 1119    bumetanide tablet 4 mg  4 mg Oral BID loop Angela Shen MD   4 mg at 04/29/24 1002    cefTRIAXone (ROCEPHIN) 2 g in dextrose 5 % in water (D5W) 100 mL IVPB (MB+)  2 g Intravenous Q12H NnsuuAxel MD   Stopped at 04/29/24 1049    dextrose 10% bolus 125 mL 125 mL  12.5 g Intravenous PRN Erasmo Parrish, RONI, FNP         dextrose 10% bolus 250 mL 250 mL  25 g Intravenous PRN Erasmo Parrish DNP, COREEN        famotidine tablet 20 mg  20 mg Oral Daily Lucy Brand MD   20 mg at 04/29/24 1003    gabapentin capsule 100 mg  100 mg Oral BID Javed Vivas MD   100 mg at 04/29/24 1002    glucagon (human recombinant) injection 1 mg  1 mg Intramuscular PRN Erasmo Parrish DNP, FNP        glucose chewable tablet 16 g  16 g Oral PRN Erasmo Parrish DNP, FNP        glucose chewable tablet 24 g  24 g Oral PRN Erasmo Parrish DNP, FNP        hydrALAZINE tablet 100 mg  100 mg Oral Q8H Angela Shen MD   100 mg at 04/29/24 0538    insulin aspart U-100 pen 0-5 Units  0-5 Units Subcutaneous QID (AC + HS) PRErasmo George DNP, FNP        magnesium oxide tablet 400 mg  400 mg Oral Daily Lucy Brand MD   400 mg at 04/29/24 1005    senna-docusate 8.6-50 mg per tablet 2 tablet  2 tablet Oral Daily PRN Lucy Brand MD        sodium bicarbonate tablet 325 mg  325 mg Oral TID Lucy Brand MD   325 mg at 04/29/24 1002    trazodone split tablet 25 mg  25 mg Oral QHS Lucy Brand MD   25 mg at 04/28/24 2039    venlafaxine tablet 37.5 mg  37.5 mg Oral Daily Lucy Brand MD   37.5 mg at 04/29/24 1002    warfarin (COUMADIN) tablet 2.5 mg  2.5 mg Oral Once per day on Sunday Monday Wednesday Friday Saturday Sandhya Price MD        [START ON 4/30/2024] warfarin split tablet 3.75 mg  3.75 mg Oral Once per day on Tuesday Thursday Sandhya Price MD           Objective:     Vital Signs (Most Recent):  Temp: 98.3 °F (36.8 °C) (04/29/24 1100)  Pulse: 86 (04/29/24 1100)  Resp: 18 (04/29/24 1100)  BP: 105/76 (04/29/24 1206)  SpO2: 95 % (04/29/24 1100) Vital Signs (24h Range):  Temp:  [98 °F (36.7 °C)-99.9 °F (37.7 °C)] 98.3 °F (36.8 °C)  Pulse:  [77-99] 86  Resp:  [16-18] 18  SpO2:  [92 %-97 %] 95 %  BP: ()/(0-78) 105/76     Weight: 80.6 kg (177 lb 11.1 oz) (04/29/24 0459)  Body  mass index is 22.81 kg/m².  Body surface area is 2.05 meters squared.    I/O last 3 completed shifts:  In: 1722 [P.O.:1422; I.V.:300]  Out: 2100 [Urine:2100]     Physical Exam  Pulmonary:      Effort: Pulmonary effort is normal. No respiratory distress.   Musculoskeletal:      Right lower leg: No edema.      Left lower leg: No edema.   Neurological:      General: No focal deficit present.      Mental Status: He is alert and oriented to person, place, and time.          Significant Labs:  BMP:   Recent Labs   Lab 04/29/24  0451         K 3.7      CO2 25   BUN 33*   CREATININE 4.1*   CALCIUM 8.4*   MG 1.8     CBC:   Recent Labs   Lab 04/29/24  0451   WBC 6.60   RBC 2.27*   HGB 6.3*   HCT 20.4*      MCV 90   MCH 27.8   MCHC 30.9*        Significant Imaging:  Labs: Reviewed  Assessment/Plan:     Pulmonary  * Pneumonia due to COVID-19 virus  Mgmt per primary team    Renal/  IVÁN (acute kidney injury)  Anuric IVÁN on baseline CKD IV  Baseline Scr ~4; Scr at time of consult ~8.3  UA Trace protein; bland otherwise.   UPCR 0.61g  Renal US: not avaialbe. In 2023 showed MRD.  Suspect ATN given rapid increase in Scr and anuria, now making urine.  Patient with prior CKD V poor reserve. Chart reviewed in depth, no inciting cause of IVÁN (normal vanc troughs, no obvious nephrotoxins, normal CPK). Suspect insult occurred in setting of COVID/bacteremia.     Plan;  -Will monitor Renal functions till tomorrow, if sCr remains stable and UOP ~ 800ml, will defer TDC placement, otherwise will needs TDC if sCr rises and UOP < 750-800ml.  -Keep on Bumax 4mg BID  - renally dose all medications  - avoid nephrotoxins as much as possible.  -PRBC for anemia        Thank you for your consult. I will follow-up with patient. Please contact us if you have any additional questions.    Kamilla Angela MD  Nephrology  [facility]

## 2024-04-29 NOTE — SUBJECTIVE & OBJECTIVE
Interval History:  Patient reports no new complaints this morning.  Discussed case with Nephrology and they want to wait on line placement until tomorrow to monitor UOP and creatinine.  Will keep him NPO tonight and consult IR in the am if Nephrology doesn't feel like a trialysis line is needed. He remains on Rocephin and Ampicillin for total of 6 week with end date estimated 5/30/24.  Patient is +932cc in the last 24 hours.  CVP: 9, SVO2: 60, CO: 9.1, CI: 4.43, SVR: 685.      Lines:  Right IJ: 4/21/24    Continuous Infusions:  Current Facility-Administered Medications   Medication Dose Route Frequency Last Rate Last Admin     Scheduled Meds:  Current Facility-Administered Medications   Medication Dose Route Frequency    amiodarone  400 mg Oral Daily    ampicillin IV (PEDS and ADULTS)  2 g Intravenous Q12H    bumetanide  4 mg Oral BID loop    cefTRIAXone (Rocephin) IV (PEDS and ADULTS)  2 g Intravenous Q12H    famotidine  20 mg Oral Daily    gabapentin  100 mg Oral BID    hydrALAZINE  100 mg Oral Q8H    magnesium oxide  400 mg Oral Daily    sodium bicarbonate  325 mg Oral TID    traZODone  25 mg Oral QHS    venlafaxine  37.5 mg Oral Daily    warfarin  2 mg Oral Daily     PRN Meds:  Current Facility-Administered Medications:     acetaminophen, 1,000 mg, Oral, Q8H PRN    dextrose 10%, 12.5 g, Intravenous, PRN    dextrose 10%, 25 g, Intravenous, PRN    glucagon (human recombinant), 1 mg, Intramuscular, PRN    glucose, 16 g, Oral, PRN    glucose, 24 g, Oral, PRN    insulin aspart U-100, 0-5 Units, Subcutaneous, QID (AC + HS) PRN    senna-docusate 8.6-50 mg, 2 tablet, Oral, Daily PRN    Review of patient's allergies indicates:  No Known Allergies  Objective:     Vital Signs (Most Recent):  Temp: 98 °F (36.7 °C) (04/29/24 0800)  Pulse: 88 (04/29/24 0800)  Resp: 16 (04/29/24 0800)  BP: (!) 90/0 (04/29/24 0800)  SpO2: 96 % (04/29/24 0924) Vital Signs (24h Range):  Temp:  [98 °F (36.7 °C)-99.9 °F (37.7 °C)] 98 °F (36.7  °C)  Pulse:  [77-99] 88  Resp:  [16-18] 16  SpO2:  [92 %-97 %] 96 %  BP: ()/(0-78) 90/0     Patient Vitals for the past 72 hrs (Last 3 readings):   Weight   04/29/24 0459 80.6 kg (177 lb 11.1 oz)   04/28/24 0545 80.2 kg (176 lb 12.9 oz)   04/27/24 0505 80.4 kg (177 lb 4 oz)     Body mass index is 22.81 kg/m².      Intake/Output Summary (Last 24 hours) at 4/29/2024 1130  Last data filed at 4/29/2024 1022  Gross per 24 hour   Intake 682 ml   Output 850 ml   Net -168 ml       Hemodynamic Parameters:  CVP:  [9 mmHg] 9 mmHg    Telemetry: SR with PVC's       Physical Exam  Constitutional:       Appearance: He is ill-appearing.   HENT:      Head: Normocephalic and atraumatic.   Eyes:      Conjunctiva/sclera: Conjunctivae normal.      Pupils: Pupils are equal, round, and reactive to light.   Neck:      Comments: Do not appreciate elevated JVP  Cardiovascular:      Rate and Rhythm: Normal rate and regular rhythm.      Comments: Smooth VAD hum  Pulmonary:      Effort: Pulmonary effort is normal.      Comments: Lungs essentially CTA bilaterally  Abdominal:      General: Bowel sounds are normal.      Palpations: Abdomen is soft.   Musculoskeletal:         General: No swelling. Normal range of motion.      Cervical back: Normal range of motion and neck supple.   Skin:     General: Skin is warm and dry.      Capillary Refill: Capillary refill takes 2 to 3 seconds.   Neurological:      General: No focal deficit present.      Mental Status: He is alert and oriented to person, place, and time.            Significant Labs:  CBC:  Recent Labs   Lab 04/27/24  0520 04/28/24  0522 04/29/24  0446 04/29/24  0451   WBC 5.84 6.59  --  6.60   RBC 2.56* 2.53*  --  2.27*   HGB 7.1* 6.9*  --  6.3*   HCT 22.9* 22.6* 40 20.4*    350  --  308   MCV 90 89  --  90   MCH 27.7 27.3  --  27.8   MCHC 31.0* 30.5*  --  30.9*     BNP:  Recent Labs   Lab 04/24/24  0425 04/26/24  0400 04/29/24  0451   BNP 1,263* 1,562* 858*     CMP:  Recent Labs    Lab 04/24/24  0425 04/25/24  0432 04/26/24  0400 04/26/24  1423 04/27/24  0520 04/28/24  0522 04/29/24  0451   GLU 95   < > 103  --  107 79 110   CALCIUM 8.6*   < > 8.2*  --  8.5* 8.6* 8.4*   ALBUMIN 2.5*  --  2.4*  --   --   --  2.5*   PROT 7.2  --  6.5  --   --   --  6.4   *   < > 138  --  138 138 138   K 3.9   < > 3.9   < > 4.2 4.0 3.7   CO2 22*   < > 24  --  24 23 25      < > 105  --  104 103 104   BUN 57*   < > 27*  --  30* 29* 33*   CREATININE 5.6*   < > 3.2*  --  4.1* 4.2* 4.1*   ALKPHOS 201*  --  180*  --   --   --  146*   *  --  135*  --   --   --  63*   *  --  73*  --   --   --  22   BILITOT 0.2  --  0.2  --   --   --  0.2    < > = values in this interval not displayed.      Coagulation:   Recent Labs   Lab 04/27/24 0520 04/28/24 0522 04/29/24  0451   INR 2.1* 1.9* 1.9*   APTT 41.7* 39.8* 39.5*     LDH:  Recent Labs   Lab 04/27/24 0520 04/28/24  0522 04/29/24  0451    244 229     Microbiology:  Microbiology Results (last 7 days)       Procedure Component Value Units Date/Time    Blood culture [[id number]] Collected: 04/20/24 0234    Order Status: Completed Specimen: Blood Updated: 04/25/24 0612     Blood Culture, Routine No growth after 5 days.    Blood culture [[id number]] Collected: 04/20/24 0240    Order Status: Completed Specimen: Blood Updated: 04/25/24 0612     Blood Culture, Routine No growth after 5 days.    Blood culture [[id number]] Collected: 04/18/24 0534    Order Status: Completed Specimen: Blood Updated: 04/23/24 1012     Blood Culture, Routine No growth after 5 days.    Blood culture [[id number]] Collected: 04/18/24 0535    Order Status: Completed Specimen: Blood Updated: 04/23/24 1012     Blood Culture, Routine No growth after 5 days.            I have reviewed all pertinent labs within the past 24 hours.    Estimated Creatinine Clearance: 21.3 mL/min (A) (based on SCr of 4.1 mg/dL (H)).    Diagnostic Results:  I have reviewed and interpreted all  pertinent imaging results/findings within the past 24 hours.

## 2024-04-29 NOTE — PROGRESS NOTES
[facility] - [facility] Step[facility]  Heart Transplant  Progress Note    Patient Name: Radha Abbott  MRN: [id number]  Admission Date: 4/16/2024  Hospital Length of Stay: 13 days  Attending Physician: Sandhya Price MD  Primary Care Provider: Vasu Kong MD  Principal Problem:Pneumonia due to COVID-19 virus    Subjective:   Interval History:  Patient reports no new complaints this morning.  Discussed case with Nephrology and they want to wait on line placement until tomorrow to monitor UOP and creatinine.  Will keep him NPO tonight and consult IR in the am if Nephrology doesn't feel like a trialysis line is needed. He remains on Rocephin and Ampicillin for total of 6 week with end date estimated 5/30/24.  Patient is +932cc in the last 24 hours.  CVP: 9, SVO2: 60, CO: 9.1, CI: 4.43, SVR: 685.      Lines:  Right IJ: 4/21/24    Continuous Infusions:  Current Facility-Administered Medications   Medication Dose Route Frequency Last Rate Last Admin     Scheduled Meds:  Current Facility-Administered Medications   Medication Dose Route Frequency    amiodarone  400 mg Oral Daily    ampicillin IV (PEDS and ADULTS)  2 g Intravenous Q12H    bumetanide  4 mg Oral BID loop    cefTRIAXone (Rocephin) IV (PEDS and ADULTS)  2 g Intravenous Q12H    famotidine  20 mg Oral Daily    gabapentin  100 mg Oral BID    hydrALAZINE  100 mg Oral Q8H    magnesium oxide  400 mg Oral Daily    sodium bicarbonate  325 mg Oral TID    traZODone  25 mg Oral QHS    venlafaxine  37.5 mg Oral Daily    warfarin  2 mg Oral Daily     PRN Meds:  Current Facility-Administered Medications:     acetaminophen, 1,000 mg, Oral, Q8H PRN    dextrose 10%, 12.5 g, Intravenous, PRN    dextrose 10%, 25 g, Intravenous, PRN    glucagon (human recombinant), 1 mg, Intramuscular, PRN    glucose, 16 g, Oral, PRN    glucose, 24 g, Oral, PRN    insulin aspart U-100, 0-5 Units, Subcutaneous, QID (AC + HS) PRN    senna-docusate 8.6-50 mg, 2 tablet, Oral, Daily PRN    Review of  patient's allergies indicates:  No Known Allergies  Objective:     Vital Signs (Most Recent):  Temp: 98 °F (36.7 °C) (04/29/24 0800)  Pulse: 88 (04/29/24 0800)  Resp: 16 (04/29/24 0800)  BP: (!) 90/0 (04/29/24 0800)  SpO2: 96 % (04/29/24 0924) Vital Signs (24h Range):  Temp:  [98 °F (36.7 °C)-99.9 °F (37.7 °C)] 98 °F (36.7 °C)  Pulse:  [77-99] 88  Resp:  [16-18] 16  SpO2:  [92 %-97 %] 96 %  BP: ()/(0-78) 90/0     Patient Vitals for the past 72 hrs (Last 3 readings):   Weight   04/29/24 0459 80.6 kg (177 lb 11.1 oz)   04/28/24 0545 80.2 kg (176 lb 12.9 oz)   04/27/24 0505 80.4 kg (177 lb 4 oz)     Body mass index is 22.81 kg/m².      Intake/Output Summary (Last 24 hours) at 4/29/2024 1130  Last data filed at 4/29/2024 1022  Gross per 24 hour   Intake 682 ml   Output 850 ml   Net -168 ml       Hemodynamic Parameters:  CVP:  [9 mmHg] 9 mmHg    Telemetry: SR with PVC's       Physical Exam  Constitutional:       Appearance: He is ill-appearing.   HENT:      Head: Normocephalic and atraumatic.   Eyes:      Conjunctiva/sclera: Conjunctivae normal.      Pupils: Pupils are equal, round, and reactive to light.   Neck:      Comments: Do not appreciate elevated JVP  Cardiovascular:      Rate and Rhythm: Normal rate and regular rhythm.      Comments: Smooth VAD hum  Pulmonary:      Effort: Pulmonary effort is normal.      Comments: Lungs essentially CTA bilaterally  Abdominal:      General: Bowel sounds are normal.      Palpations: Abdomen is soft.   Musculoskeletal:         General: No swelling. Normal range of motion.      Cervical back: Normal range of motion and neck supple.   Skin:     General: Skin is warm and dry.      Capillary Refill: Capillary refill takes 2 to 3 seconds.   Neurological:      General: No focal deficit present.      Mental Status: He is alert and oriented to person, place, and time.            Significant Labs:  CBC:  Recent Labs   Lab 04/27/24  0520 04/28/24  0522 04/29/24  0446 04/29/24  0456    WBC 5.84 6.59  --  6.60   RBC 2.56* 2.53*  --  2.27*   HGB 7.1* 6.9*  --  6.3*   HCT 22.9* 22.6* 40 20.4*    350  --  308   MCV 90 89  --  90   MCH 27.7 27.3  --  27.8   MCHC 31.0* 30.5*  --  30.9*     BNP:  Recent Labs   Lab 04/24/24 0425 04/26/24 0400 04/29/24 0451   BNP 1,263* 1,562* 858*     CMP:  Recent Labs   Lab 04/24/24 0425 04/25/24  0432 04/26/24 0400 04/26/24 1423 04/27/24 0520 04/28/24 0522 04/29/24 0451   GLU 95   < > 103  --  107 79 110   CALCIUM 8.6*   < > 8.2*  --  8.5* 8.6* 8.4*   ALBUMIN 2.5*  --  2.4*  --   --   --  2.5*   PROT 7.2  --  6.5  --   --   --  6.4   *   < > 138  --  138 138 138   K 3.9   < > 3.9   < > 4.2 4.0 3.7   CO2 22*   < > 24  --  24 23 25      < > 105  --  104 103 104   BUN 57*   < > 27*  --  30* 29* 33*   CREATININE 5.6*   < > 3.2*  --  4.1* 4.2* 4.1*   ALKPHOS 201*  --  180*  --   --   --  146*   *  --  135*  --   --   --  63*   *  --  73*  --   --   --  22   BILITOT 0.2  --  0.2  --   --   --  0.2    < > = values in this interval not displayed.      Coagulation:   Recent Labs   Lab 04/27/24 0520 04/28/24 0522 04/29/24 0451   INR 2.1* 1.9* 1.9*   APTT 41.7* 39.8* 39.5*     LDH:  Recent Labs   Lab 04/27/24  0520 04/28/24  0522 04/29/24  0451    244 229     Microbiology:  Microbiology Results (last 7 days)       Procedure Component Value Units Date/Time    Blood culture [[id number]] Collected: 04/20/24 0234    Order Status: Completed Specimen: Blood Updated: 04/25/24 0612     Blood Culture, Routine No growth after 5 days.    Blood culture [[id number]] Collected: 04/20/24 0240    Order Status: Completed Specimen: Blood Updated: 04/25/24 0612     Blood Culture, Routine No growth after 5 days.    Blood culture [[id number]] Collected: 04/18/24 0534    Order Status: Completed Specimen: Blood Updated: 04/23/24 1012     Blood Culture, Routine No growth after 5 days.    Blood culture [[id number]] Collected: 04/18/24 0535    Order  Status: Completed Specimen: Blood Updated: 04/23/24 1012     Blood Culture, Routine No growth after 5 days.            I have reviewed all pertinent labs within the past 24 hours.    Estimated Creatinine Clearance: 21.3 mL/min (A) (based on SCr of 4.1 mg/dL (H)).    Diagnostic Results:  I have reviewed and interpreted all pertinent imaging results/findings within the past 24 hours.  Assessment and Plan:     Mr. Radha Abbott is a very pleasant 65 yo male with stage D HFrEF, ICMP with previous admission for ADHF/CS. He did undergo HM3 placement placed by Dr Washington on 12/1/23. Lengthy post op course complicated by vasoplegia(requiring Giaprezza), debility, malnutrition/impaired swallowing, and renal failure requiring HD(since weaned off). Once medically stable, he was transferred to Rehab for further PT/OT/ST. Of note, pt left with permacath still in place. He has done really well in rehab and now has continued to progress well at home. He is on a very high dose of Bumex 4 mg twice daily. VAD speed is at 5000 rpm. No VAD alarms noted on interrogation occasional PI events . Today he presents to the ED for infectious workup due to complaints of fevers of 101.5F associated with fatigue, generalized weakness, lower back pain and HA. He also reports a decline in appetite. Wife changes his driveline dressing every Saturday and has not noticed any purulance with dressing changes. Body aches/weakness started around 2am last night followed by fevers around 5pm. He has been taking tylenol which has helped brng down fevers but has not helped with HA. HA is 5/10 in intensity. He reports slight increase in body weight/ fluid retention yesterday for which he took a dose of Metolazone with a drop in weight by ~5lbs. Denies any SOB, LE swelling at this time.      2D Echo with CFD done on 3/26/2024  LVAD: There is a Heartmate III LVAD running at 5000 RPM. The aortic valve does not open. The ventricular septum is at midline. Inflow  cannula well seated at the apex. Outflow graft not visualized.    Left Ventricle: The left ventricle is moderately dilated. Normal wall thickness. Global hypokinesis present. Septal motion is abnormal. There is severely reduced systolic function with a visually estimated ejection fraction of 5 - 10%. There is diastolic dysfunction but grade cannot be determined.    Right Ventricle: Mild right ventricular enlargement. Wall thickness is normal. Right ventricle wall motion  is normal. Systolic function is normal. Pacemaker lead present in the ventricle.    Left Atrium: Left atrium is severely dilated.    Right Atrium: Right atrium is moderately dilated.    Mitral Valve: The mitral valve is repaired with an Noble stitch. There is mild regurgitation.    IVC/SVC: Normal venous pressure at 3 mmHg.    * Pneumonia due to COVID-19 virus  -4/16/24 he presented to the ED for infectious workup due to complaints of fevers of 101.5F associated with fatigue, generalized weakness, lower back pain and HA that started this morning. No evidence of drive line infection. No history of line infection.   - COVID +ve  - CT chest/ abdomen / pelvis 4/16 showed new RML opacity  - S/p remdesavir.   - continue tylenol for fevers    Bacteremia  -blood cxs from 4/16 +ve for E faecalis   -ID following  -E faecalis is a known cause of endocarditis and AICD lead infections.   -TTE was negative for vegetation.  However TTE does not rule out valvular or lead vegetation. Will treat this as endocarditis with 6 weeks of IV antibiotics followed by suppressive therapy   -Currently on Ampicillin and Ceftriaxone for 6wk course as per ID. Estimated end date of therapy 5/30/24      LVAD (left ventricular assist device) present  -HeartMate 3 Implanted  12/1/2023  as DT  -Cont Coumadin, dosing by PharmD.  Goal INR 2.0-3.0 . Subtherapeutic today.   -Antiplatelets Not on ASA due to low H/H on implant   -LDH is stable overall today. Will continue to monitor  daily.  -Speed set at  5000     -Interrogation notable for no events  -ECHO 4/18/24 with speed at 5000 EF: 10-15%, LVEDD: 6.1cm, RV size normal function mod reduced   -RHC 1/22/24 with RA 13, PCWP 22, PA 50/25, CO 5.8, CI 3.0   -Not listed for OHTx, declined for OHTX due to comorbidities     Procedure: Device Interrogation Including analysis of device parameters  Current Settings: Ventricular Assist Device  Review of device function is stable/unstable stable        4/29/2024     8:34 AM 4/29/2024     4:17 AM 4/29/2024     1:00 AM 4/28/2024     7:49 PM 4/28/2024     4:47 PM 4/28/2024    11:31 AM 4/28/2024     8:06 AM   TXP LVAD INTERROGATIONS   Type HeartMate3 HeartMate3 HeartMate3 HeartMate3 HeartMate3 HeartMate3 HeartMate3   Flow 3.8 4.1 3.9 3.8 3.6 3.9 4   Speed 5000 5000 5000 5000 5000 5000 5000   PI 4.8 4.1 3.6 5.3 6.2 3.9 3.9   Power ([place]) 3.4 3.4 3.4 3.5 3.4 3.4 3.5   LSL 4600 4600        Pulsatility Pulse Pulse Pulse Pulse Pulse Pulse Pulse         Hyponatremia  -POA  -Daily labs       IVÁN (acute kidney injury)  Most likely 2/2 to ATN on CKD IV.      - As per nephrology, will decide on 4/29 whether patient will need HD moving forwards and will require a tunneled line placed.   - patient starting to produce urine. Creatinine stable.   - Will continue to monitor.  - Nephrology consulted. Appreciate recs.         Acute on chronic combined systolic and diastolic heart failure  -ICM  -Last 2D Echo  4/18/24 : LVEF 10-15%, LVEDD  6.1 cm  -Euvolemic on examination today. Continue home bumex 4mg bid.   -GDMT with Hydralazine  -2g Na dietary restriction, 1500 mL fluid restriction, strict I/Os      Ventricular tachycardia  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    Type 2 diabetes mellitus without complication, without long-term current use of insulin  -SSI    Anemia  - possibly secondary to dilutional anemia in the setting of hypervolemia.   - Will give IV lasix and recheck cbc.     Hypertension  Continue  hydralazine    Headache  C/o of HA on admit with no improvement despite tylenol  - CTH done 4/16 with no acute processes        DENIZ Faye  Heart Transplant  [facility] - Cardiology Step[facility]

## 2024-04-29 NOTE — PROGRESS NOTES
Update:  SW attempted to reach pt's wife via phone to f/up.  Left message.  SW will follow.     Addendum 2:20pm  CRISTAL contacted Bessie with [employer] to provide update on possible dc soon per team.  CRISTAL will follow.

## 2024-04-29 NOTE — NURSING
Notified MD Sheila with [facility] of hemoglobin of 6.9 and scheduled procedure for 4/29. No blood transfusion needed at this time and continue to monitor per MD.

## 2024-04-29 NOTE — PROGRESS NOTES
"[facility] - Cardiology Stepdown  Adult Nutrition  Progress Note    SUMMARY       Recommendations    1.) Recommend continuing with Renal/Coumadin diet, fluid per MD.                  - consider liberalizing diet as renal labs improve, with goal of Cardiac/Coumadin diet.      2.) If PO intake <50%, recommend Boost Plus BID to help meet increased needs.      3.) RD to monitor.     Goals: to meet % of EEN/EPN by next RD f/u  Nutrition Goal Status: goal not met (NPO since midnight)  Communication of RD Recs:  (POC)    Assessment and Plan  Nutrition Problem  Increased PRO/Kcal needs     Related to (etiology):   Increased physiological needs     Signs and Symptoms (as evidenced by):   LVAD present/pneumonia      Interventions/Recommendations (treatment strategy):  Collaboration of nutritional care with other providers.      Nutrition Diagnosis Status:   Continues    Reason for Assessment    Reason For Assessment: RD follow-up  Diagnosis: infection/sepsis  Relevant Medical History: CAD, CHF s/p LVAD, DM2  Interdisciplinary Rounds: did not attend  General Information Comments: RD f/u for LVAD pt. NPO since midnight. Noted with 100% intake of meals per RN flowsheets prior. Pt remains insolation/contact. RD to follow up  Nutrition Discharge Planning: adequate nutritional intake    Nutrition Related Social Determinants of Health: SDOH: Unable to assess at this time.       Nutrition Risk Screen    Nutrition Risk Screen: no indicators present    Nutrition/Diet History    Patient Reported Diet/Restrictions/Preferences: low salt  Typical Food/Fluid Intake: 2 meals + snacks  Spiritual, Cultural Beliefs, Hoahaoism Practices, Values that Affect Care: no    Anthropometrics    Temp: 98.3 °F (36.8 °C)  Height: 6' 2" (188 cm)  Height (inches): 74 in  Weight Method: Standard Scale  Weight: 80.6 kg (177 lb 11.1 oz)  Weight (lb): 177.69 lb  Ideal Body Weight (IBW), Male: 190 lb  % Ideal Body Weight, Male (lb): 87.03 %  BMI (Calculated): " 22.8       Lab/Procedures/Meds    Pertinent Labs Reviewed: reviewed  Pertinent Labs Comments: H/h: 6.3/20.4, mchc: 30.9, BUN: 33, creatinine: 4.1, GFR: 15.7, ALT; 63, CRP: 21.3  Pertinent Medications Reviewed: reviewed  Pertinent Medications Comments: Abx, famotidine, gabapentin, magnesium oxide, trazodone, warfarin    Physical Findings/Assessment         Estimated/Assessed Needs    Weight Used For Calorie Calculations: 78 kg (171 lb 15.3 oz)  Energy Calorie Requirements (kcal): 1950- 2340 kcal  Energy Need Method: Kcal/kg (25-30 kcal/kg)  Protein Requirements: 94- 109 g (1.2-1.4g/kg)  Weight Used For Protein Calculations: 78 kg (171 lb 15.3 oz)  Fluid Requirements (mL): 1ml/1kcal or per MD  Estimated Fluid Requirement Method: RDA Method  RDA Method (mL): 1950  CHO Requirement: 244- 293 g      Nutrition Prescription Ordered    Current Diet Order: NPO (since midnight)    Evaluation of Received Nutrient/Fluid Intake    I/O: +1.7 L since admit  Energy Calories Required: meeting needs  Protein Required: meeting needs  Fluid Required:  (as per MD)  Comments: LBM 4/28  Tolerance: tolerating  % Intake of Estimated Energy Needs: 0 - 25 %  % Meal Intake: NPO    Nutrition Risk    Level of Risk/Frequency of Follow-up:  (RD to f/u x 1-2/week)     Monitor and Evaluation    Food and Nutrient Intake: energy intake, food and beverage intake  Food and Nutrient Adminstration: diet order  Knowledge/Beliefs/Attitudes: food and nutrition knowledge/skill  Physical Activity and Function: nutrition-related ADLs and IADLs  Anthropometric Measurements: weight, weight change, body mass index  Biochemical Data, Medical Tests and Procedures: electrolyte and renal panel, gastrointestinal profile, glucose/endocrine profile, inflammatory profile, lipid profile  Nutrition-Focused Physical Findings: overall appearance, skin     Nutrition Follow-Up    RD Follow-up?: Yes    Marion Salas RD, LDN

## 2024-04-29 NOTE — PLAN OF CARE
Problem: Adult Inpatient Plan of Care  Goal: Plan of Care Review  Outcome: Progressing  Goal: Patient-Specific Goal (Individualized)  Outcome: Progressing  Goal: Absence of Hospital-Acquired Illness or Injury  Outcome: Progressing  Goal: Optimal Comfort and Wellbeing  Outcome: Progressing  Goal: Readiness for Transition of Care  Outcome: Progressing     Problem: Diabetes Comorbidity  Goal: Blood Glucose Level Within Targeted Range  Outcome: Progressing     Problem: Adjustment to Illness (Sepsis/Septic Shock)  Goal: Optimal Coping  Outcome: Progressing     Problem: Bleeding (Sepsis/Septic Shock)  Goal: Absence of Bleeding  Outcome: Progressing     Problem: Glycemic Control Impaired (Sepsis/Septic Shock)  Goal: Blood Glucose Level Within Desired Range  Outcome: Progressing     Problem: Infection Progression (Sepsis/Septic Shock)  Goal: Absence of Infection Signs and Symptoms  Outcome: Progressing     Problem: Nutrition Impaired (Sepsis/Septic Shock)  Goal: Optimal Nutrition Intake  Outcome: Progressing     Problem: Skin Injury Risk Increased  Goal: Skin Health and Integrity  Outcome: Progressing     Problem: Fluid Imbalance (Pneumonia)  Goal: Fluid Balance  Outcome: Progressing     Problem: Infection (Pneumonia)  Goal: Resolution of Infection Signs and Symptoms  Outcome: Progressing     Problem: Respiratory Compromise (Pneumonia)  Goal: Effective Oxygenation and Ventilation  Outcome: Progressing     Problem: Fall Injury Risk  Goal: Absence of Fall and Fall-Related Injury  Outcome: Progressing     Problem: Fluid and Electrolyte Imbalance (Acute Kidney Injury/Impairment)  Goal: Fluid and Electrolyte Balance  Outcome: Progressing     Problem: Oral Intake Inadequate (Acute Kidney Injury/Impairment)  Goal: Optimal Nutrition Intake  Outcome: Progressing     Problem: Renal Function Impairment (Acute Kidney Injury/Impairment)  Goal: Effective Renal Function  Outcome: Progressing     Problem: Infection  Goal: Absence of  Infection Signs and Symptoms  Outcome: Progressing     Problem: Device-Related Complication Risk (Hemodialysis)  Goal: Safe, Effective Therapy Delivery  Outcome: Progressing     Problem: Hemodynamic Instability (Hemodialysis)  Goal: Effective Tissue Perfusion  Outcome: Progressing     Problem: Infection (Hemodialysis)  Goal: Absence of Infection Signs and Symptoms  Outcome: Progressing     Problem: Adjustment to Device (Ventricular Assist Device)  Goal: Optimal Adjustment to Device  Outcome: Progressing     Problem: Bleeding (Ventricular Assist Device)  Goal: Absence of Bleeding  Outcome: Progressing     Problem: Embolism (Ventricular Assist Device)  Goal: Absence of Embolism Signs and Symptoms  Outcome: Progressing     Problem: Hemodynamic Instability (Ventricular Assist Device)  Goal: Optimal Blood Flow  Outcome: Progressing     Problem: Infection (Ventricular Assist Device)  Goal: Absence of Infection Signs and Symptoms  Outcome: Progressing     Problem: Right-Sided Heart Compromise (Ventricular Assist Device)  Goal: Effective Right-Sided Heart Function  Outcome: Progressing

## 2024-04-29 NOTE — SUBJECTIVE & OBJECTIVE
Interval History:   No acute events overnight, UOP ~500ml, Scr plateau at 4.1, no swelling in extremities, on room air.    Review of patient's allergies indicates:  No Known Allergies  Current Facility-Administered Medications   Medication Dose Route Frequency Provider Last Rate Last Admin    acetaminophen tablet 1,000 mg  1,000 mg Oral Q8H PRN Lucy Brand MD   1,000 mg at 04/28/24 2038    amiodarone tablet 400 mg  400 mg Oral Daily Lucy Brand MD   400 mg at 04/29/24 1002    ampicillin (OMNIPEN) 2 g in sodium chloride 0.9 % 100 mL IVPB (MB+)  2 g Intravenous Q12H Axel Caban MD   Stopped at 04/29/24 1119    bumetanide tablet 4 mg  4 mg Oral BID loop Angela Shen MD   4 mg at 04/29/24 1002    cefTRIAXone (ROCEPHIN) 2 g in dextrose 5 % in water (D5W) 100 mL IVPB (MB+)  2 g Intravenous Q12H Axel Caban MD   Stopped at 04/29/24 1049    dextrose 10% bolus 125 mL 125 mL  12.5 g Intravenous PRN Erasmo Parrish DNP, FNP        dextrose 10% bolus 250 mL 250 mL  25 g Intravenous PRN Erasmo Parrish DNP, FNP        famotidine tablet 20 mg  20 mg Oral Daily Lucy Brand MD   20 mg at 04/29/24 1003    gabapentin capsule 100 mg  100 mg Oral BID Javed Vivas MD   100 mg at 04/29/24 1002    glucagon (human recombinant) injection 1 mg  1 mg Intramuscular PRN Erasmo Parrish DNP, FNP        glucose chewable tablet 16 g  16 g Oral PRN Erasmo Parrish DNP, FNP        glucose chewable tablet 24 g  24 g Oral PRN Erasmo Parrish DNP, FNP        hydrALAZINE tablet 100 mg  100 mg Oral Q8H Angela Shen MD   100 mg at 04/29/24 0538    insulin aspart U-100 pen 0-5 Units  0-5 Units Subcutaneous QID (AC + HS) PRN Erasmo Parrish DNP, FNP        magnesium oxide tablet 400 mg  400 mg Oral Daily Lcuy Brand MD   400 mg at 04/29/24 1005    senna-docusate 8.6-50 mg per tablet 2 tablet  2 tablet Oral Daily PRN Lucy Brand MD        sodium bicarbonate  tablet 325 mg  325 mg Oral TID Lucy Brand MD   325 mg at 04/29/24 1002    trazodone split tablet 25 mg  25 mg Oral QHS Lucy Brand MD   25 mg at 04/28/24 2039    venlafaxine tablet 37.5 mg  37.5 mg Oral Daily Lucy Brand MD   37.5 mg at 04/29/24 1002    warfarin (COUMADIN) tablet 2.5 mg  2.5 mg Oral Once per day on Sunday Monday Wednesday Friday Saturday Sandhya Price MD        [START ON 4/30/2024] warfarin split tablet 3.75 mg  3.75 mg Oral Once per day on Tuesday Thursday Sandhya Price MD           Objective:     Vital Signs (Most Recent):  Temp: 98.3 °F (36.8 °C) (04/29/24 1100)  Pulse: 86 (04/29/24 1100)  Resp: 18 (04/29/24 1100)  BP: 105/76 (04/29/24 1206)  SpO2: 95 % (04/29/24 1100) Vital Signs (24h Range):  Temp:  [98 °F (36.7 °C)-99.9 °F (37.7 °C)] 98.3 °F (36.8 °C)  Pulse:  [77-99] 86  Resp:  [16-18] 18  SpO2:  [92 %-97 %] 95 %  BP: ()/(0-78) 105/76     Weight: 80.6 kg (177 lb 11.1 oz) (04/29/24 0459)  Body mass index is 22.81 kg/m².  Body surface area is 2.05 meters squared.    I/O last 3 completed shifts:  In: 1722 [P.O.:1422; I.V.:300]  Out: 2100 [Urine:2100]     Physical Exam  Pulmonary:      Effort: Pulmonary effort is normal. No respiratory distress.   Musculoskeletal:      Right lower leg: No edema.      Left lower leg: No edema.   Neurological:      General: No focal deficit present.      Mental Status: He is alert and oriented to person, place, and time.          Significant Labs:  BMP:   Recent Labs   Lab 04/29/24  0451         K 3.7      CO2 25   BUN 33*   CREATININE 4.1*   CALCIUM 8.4*   MG 1.8     CBC:   Recent Labs   Lab 04/29/24  0451   WBC 6.60   RBC 2.27*   HGB 6.3*   HCT 20.4*      MCV 90   MCH 27.8   MCHC 30.9*        Significant Imaging:  Labs: Reviewed

## 2024-04-30 LAB
ALLENS TEST: ABNORMAL
ANION GAP SERPL CALC-SCNC: 10 MMOL/L (ref 8–16)
APTT PPP: 39.6 SEC (ref 21–32)
BASOPHILS # BLD AUTO: 0.03 K/UL (ref 0–0.2)
BASOPHILS NFR BLD: 0.4 % (ref 0–1.9)
BUN SERPL-MCNC: 38 MG/DL (ref 8–23)
CALCIUM SERPL-MCNC: 8.4 MG/DL (ref 8.7–10.5)
CHLORIDE SERPL-SCNC: 104 MMOL/L (ref 95–110)
CO2 SERPL-SCNC: 24 MMOL/L (ref 23–29)
CREAT SERPL-MCNC: 4.3 MG/DL (ref 0.5–1.4)
DELSYS: ABNORMAL
DIFFERENTIAL METHOD BLD: ABNORMAL
EOSINOPHIL # BLD AUTO: 0.2 K/UL (ref 0–0.5)
EOSINOPHIL NFR BLD: 2.7 % (ref 0–8)
ERYTHROCYTE [DISTWIDTH] IN BLOOD BY AUTOMATED COUNT: 17.8 % (ref 11.5–14.5)
EST. GFR  (NO RACE VARIABLE): 14.8 ML/MIN/1.73 M^2
GLUCOSE SERPL-MCNC: 138 MG/DL (ref 70–110)
HCT VFR BLD AUTO: 20.2 % (ref 40–54)
HGB BLD-MCNC: 6.4 G/DL (ref 14–18)
IMM GRANULOCYTES # BLD AUTO: 0.05 K/UL (ref 0–0.04)
IMM GRANULOCYTES NFR BLD AUTO: 0.6 % (ref 0–0.5)
INR PPP: 1.9 (ref 0.8–1.2)
LDH SERPL L TO P-CCNC: 247 U/L (ref 110–260)
LYMPHOCYTES # BLD AUTO: 0.7 K/UL (ref 1–4.8)
LYMPHOCYTES NFR BLD: 9.1 % (ref 18–48)
MAGNESIUM SERPL-MCNC: 1.7 MG/DL (ref 1.6–2.6)
MCH RBC QN AUTO: 28.6 PG (ref 27–31)
MCHC RBC AUTO-ENTMCNC: 31.7 G/DL (ref 32–36)
MCV RBC AUTO: 90 FL (ref 82–98)
MONOCYTES # BLD AUTO: 0.7 K/UL (ref 0.3–1)
MONOCYTES NFR BLD: 8.4 % (ref 4–15)
NEUTROPHILS # BLD AUTO: 6.2 K/UL (ref 1.8–7.7)
NEUTROPHILS NFR BLD: 78.8 % (ref 38–73)
NRBC BLD-RTO: 0 /100 WBC
PHOSPHATE SERPL-MCNC: 3.7 MG/DL (ref 2.7–4.5)
PLATELET # BLD AUTO: 312 K/UL (ref 150–450)
PMV BLD AUTO: 10.3 FL (ref 9.2–12.9)
PO2 BLDA: 30 MMHG (ref 40–60)
POC SATURATED O2: 56 % (ref 95–100)
POCT GLUCOSE: 120 MG/DL (ref 70–110)
POCT GLUCOSE: 239 MG/DL (ref 70–110)
POCT GLUCOSE: 251 MG/DL (ref 70–110)
POTASSIUM SERPL-SCNC: 3.8 MMOL/L (ref 3.5–5.1)
PROTHROMBIN TIME: 19.6 SEC (ref 9–12.5)
RBC # BLD AUTO: 2.24 M/UL (ref 4.6–6.2)
SAMPLE: ABNORMAL
SITE: ABNORMAL
SODIUM SERPL-SCNC: 138 MMOL/L (ref 136–145)
WBC # BLD AUTO: 7.88 K/UL (ref 3.9–12.7)

## 2024-04-30 PROCEDURE — 85025 COMPLETE CBC W/AUTO DIFF WBC: CPT | Performed by: STUDENT IN AN ORGANIZED HEALTH CARE EDUCATION/TRAINING PROGRAM

## 2024-04-30 PROCEDURE — 0JH63XZ INSERTION OF TUNNELED VASCULAR ACCESS DEVICE INTO CHEST SUBCUTANEOUS TISSUE AND FASCIA, PERCUTANEOUS APPROACH: ICD-10-PCS | Performed by: RADIOLOGY

## 2024-04-30 PROCEDURE — 80048 BASIC METABOLIC PNL TOTAL CA: CPT | Performed by: STUDENT IN AN ORGANIZED HEALTH CARE EDUCATION/TRAINING PROGRAM

## 2024-04-30 PROCEDURE — 85730 THROMBOPLASTIN TIME PARTIAL: CPT | Performed by: STUDENT IN AN ORGANIZED HEALTH CARE EDUCATION/TRAINING PROGRAM

## 2024-04-30 PROCEDURE — 84100 ASSAY OF PHOSPHORUS: CPT | Performed by: STUDENT IN AN ORGANIZED HEALTH CARE EDUCATION/TRAINING PROGRAM

## 2024-04-30 PROCEDURE — 85610 PROTHROMBIN TIME: CPT | Performed by: STUDENT IN AN ORGANIZED HEALTH CARE EDUCATION/TRAINING PROGRAM

## 2024-04-30 PROCEDURE — 99223 1ST HOSP IP/OBS HIGH 75: CPT | Mod: 25,,, | Performed by: PHYSICIAN ASSISTANT

## 2024-04-30 PROCEDURE — 83615 LACTATE (LD) (LDH) ENZYME: CPT | Performed by: STUDENT IN AN ORGANIZED HEALTH CARE EDUCATION/TRAINING PROGRAM

## 2024-04-30 PROCEDURE — 25000003 PHARM REV CODE 250: Performed by: STUDENT IN AN ORGANIZED HEALTH CARE EDUCATION/TRAINING PROGRAM

## 2024-04-30 PROCEDURE — 25000003 PHARM REV CODE 250: Performed by: INTERNAL MEDICINE

## 2024-04-30 PROCEDURE — 99900035 HC TECH TIME PER 15 MIN (STAT)

## 2024-04-30 PROCEDURE — 99232 SBSQ HOSP IP/OBS MODERATE 35: CPT | Mod: ,,, | Performed by: HOSPITALIST

## 2024-04-30 PROCEDURE — 63600175 PHARM REV CODE 636 W HCPCS: Performed by: INTERNAL MEDICINE

## 2024-04-30 PROCEDURE — 83735 ASSAY OF MAGNESIUM: CPT | Performed by: STUDENT IN AN ORGANIZED HEALTH CARE EDUCATION/TRAINING PROGRAM

## 2024-04-30 PROCEDURE — 99232 SBSQ HOSP IP/OBS MODERATE 35: CPT | Mod: ,,, | Performed by: NURSE PRACTITIONER

## 2024-04-30 PROCEDURE — 27000207 HC ISOLATION

## 2024-04-30 PROCEDURE — 99233 SBSQ HOSP IP/OBS HIGH 50: CPT | Mod: ,,, | Performed by: PHYSICIAN ASSISTANT

## 2024-04-30 PROCEDURE — 27000248 HC VAD-ADDITIONAL DAY

## 2024-04-30 PROCEDURE — 20600001 HC STEP DOWN PRIVATE ROOM

## 2024-04-30 PROCEDURE — 93750 INTERROGATION VAD IN PERSON: CPT | Mod: ,,, | Performed by: INTERNAL MEDICINE

## 2024-04-30 PROCEDURE — 63600175 PHARM REV CODE 636 W HCPCS: Performed by: STUDENT IN AN ORGANIZED HEALTH CARE EDUCATION/TRAINING PROGRAM

## 2024-04-30 PROCEDURE — 27000923 HC TRIALYSIS CATHETER, ANY SIZE

## 2024-04-30 RX ORDER — HYDRALAZINE HYDROCHLORIDE 100 MG/1
100 TABLET, FILM COATED ORAL EVERY 8 HOURS
Qty: 90 TABLET | Refills: 11 | Status: ON HOLD | OUTPATIENT
Start: 2024-04-30 | End: 2025-04-30

## 2024-04-30 RX ORDER — WARFARIN 2.5 MG/1
TABLET ORAL
Status: ON HOLD
Start: 2024-04-30 | End: 2024-05-31

## 2024-04-30 RX ORDER — FENTANYL CITRATE 50 UG/ML
INJECTION, SOLUTION INTRAMUSCULAR; INTRAVENOUS
Status: COMPLETED | OUTPATIENT
Start: 2024-04-30 | End: 2024-04-30

## 2024-04-30 RX ORDER — LIDOCAINE HYDROCHLORIDE 10 MG/ML
INJECTION INFILTRATION; PERINEURAL
Status: COMPLETED | OUTPATIENT
Start: 2024-04-30 | End: 2024-04-30

## 2024-04-30 RX ADMIN — HYDRALAZINE HYDROCHLORIDE 100 MG: 50 TABLET ORAL at 06:04

## 2024-04-30 RX ADMIN — GABAPENTIN 100 MG: 100 CAPSULE ORAL at 09:04

## 2024-04-30 RX ADMIN — SODIUM BICARBONATE 325 MG: 325 TABLET ORAL at 09:04

## 2024-04-30 RX ADMIN — TRAZODONE HYDROCHLORIDE 25 MG: 50 TABLET ORAL at 09:04

## 2024-04-30 RX ADMIN — FENTANYL CITRATE 50 MCG: 0.05 INJECTION, SOLUTION INTRAMUSCULAR; INTRAVENOUS at 04:04

## 2024-04-30 RX ADMIN — SODIUM BICARBONATE 325 MG: 325 TABLET ORAL at 03:04

## 2024-04-30 RX ADMIN — Medication 400 MG: at 09:04

## 2024-04-30 RX ADMIN — HYDRALAZINE HYDROCHLORIDE 100 MG: 50 TABLET ORAL at 05:04

## 2024-04-30 RX ADMIN — AMPICILLIN 2 G: 2 INJECTION, POWDER, FOR SOLUTION INTRAMUSCULAR; INTRAVENOUS at 08:04

## 2024-04-30 RX ADMIN — BUMETANIDE 4 MG: 1 TABLET ORAL at 05:04

## 2024-04-30 RX ADMIN — AMIODARONE HYDROCHLORIDE 400 MG: 200 TABLET ORAL at 09:04

## 2024-04-30 RX ADMIN — ACETAMINOPHEN 1000 MG: 500 TABLET ORAL at 04:04

## 2024-04-30 RX ADMIN — CEFTRIAXONE 2 G: 2 INJECTION, POWDER, FOR SOLUTION INTRAMUSCULAR; INTRAVENOUS at 09:04

## 2024-04-30 RX ADMIN — WARFARIN SODIUM 3.75 MG: 2.5 TABLET ORAL at 05:04

## 2024-04-30 RX ADMIN — FAMOTIDINE 20 MG: 20 TABLET ORAL at 09:04

## 2024-04-30 RX ADMIN — LIDOCAINE HYDROCHLORIDE 10 ML: 10 INJECTION, SOLUTION INFILTRATION; PERINEURAL at 04:04

## 2024-04-30 RX ADMIN — AMPICILLIN 2 G: 2 INJECTION, POWDER, FOR SOLUTION INTRAMUSCULAR; INTRAVENOUS at 09:04

## 2024-04-30 RX ADMIN — VENLAFAXINE 37.5 MG: 37.5 TABLET ORAL at 09:04

## 2024-04-30 RX ADMIN — HYDRALAZINE HYDROCHLORIDE 100 MG: 50 TABLET ORAL at 03:04

## 2024-04-30 RX ADMIN — BUMETANIDE 4 MG: 1 TABLET ORAL at 09:04

## 2024-04-30 RX ADMIN — ACETAMINOPHEN 1000 MG: 500 TABLET ORAL at 09:04

## 2024-04-30 NOTE — PLAN OF CARE
Pt entered the room on a stretcher. Pt is Aox4, Jessica, calm & cooperative.Pt is able to transfer to procedure table will minimal assistance. Pt is connected to continuous hemodynamic monitoring.

## 2024-04-30 NOTE — PROCEDURES
VIR procedure note        Pre Op Diagnosis:  Bacteremia  Post Op Diagnosis: Same     Procedure:  Tunneled central catheter placement     Procedure performed by:  Bashir Cervantes MD /  DILAN Wynn MD     Written Informed Consent Obtained: Yes  Specimen Removed: No  Estimated Blood Loss: Minimal     Findings:     Successful placement of 6 Fr dual lumen tunneled catheter in the right IJ     Patient tolerated procedure well.     Recommendations:    Catheter can be used immediately.     Bashir Cervantes MD  VIR Fellow

## 2024-04-30 NOTE — SUBJECTIVE & OBJECTIVE
Interval History:  Patient reports no new complaints this morning.  Patient's UOP has been 1.6L in the last 24 hours.  HE is -639cc total.  CVP: 9, SVO2: 56, CO: 7.5, CI: 3.64 and SVR: 778.  Per Nephrology, he does not need dialysis line on discharge.  IR consulted and plan for tunneled PICC placement today.  He would like to go home after line placement.  Plan is to discharge on 6 weeks of Rocephin and Ampicillin for Enterococcus Bacteremia.     Lines:  Right IJ: 4/21/24    Continuous Infusions:  Current Facility-Administered Medications   Medication Dose Route Frequency Last Rate Last Admin     Scheduled Meds:  Current Facility-Administered Medications   Medication Dose Route Frequency    amiodarone  400 mg Oral Daily    ampicillin IV (PEDS and ADULTS)  2 g Intravenous Q12H    bumetanide  4 mg Oral BID loop    cefTRIAXone (Rocephin) IV (PEDS and ADULTS)  2 g Intravenous Q12H    famotidine  20 mg Oral Daily    gabapentin  100 mg Oral BID    hydrALAZINE  100 mg Oral Q8H    magnesium oxide  400 mg Oral Daily    sodium bicarbonate  325 mg Oral TID    traZODone  25 mg Oral QHS    venlafaxine  37.5 mg Oral Daily    warfarin  2.5 mg Oral Once per day on Sunday Monday Wednesday Friday Saturday    warfarin  3.75 mg Oral Once per day on Tuesday Thursday     PRN Meds:  Current Facility-Administered Medications:     acetaminophen, 1,000 mg, Oral, Q8H PRN    dextrose 10%, 12.5 g, Intravenous, PRN    dextrose 10%, 25 g, Intravenous, PRN    glucagon (human recombinant), 1 mg, Intramuscular, PRN    glucose, 16 g, Oral, PRN    glucose, 24 g, Oral, PRN    insulin aspart U-100, 0-5 Units, Subcutaneous, QID (AC + HS) PRN    senna-docusate 8.6-50 mg, 2 tablet, Oral, Daily PRN    Review of patient's allergies indicates:  No Known Allergies  Objective:     Vital Signs (Most Recent):  Temp: 97.3 °F (36.3 °C) (04/30/24 1221)  Pulse: 87 (04/30/24 1221)  Resp: 17 (04/30/24 1221)  BP: (!) 78/0 (04/30/24 0804)  SpO2: 96 % (04/30/24 1221)  Vital Signs (24h Range):  Temp:  [97.3 °F (36.3 °C)-98.7 °F (37.1 °C)] 97.3 °F (36.3 °C)  Pulse:  [86-94] 87  Resp:  [16-18] 17  SpO2:  [95 %-100 %] 96 %  BP: ()/(0-80) 78/0     Patient Vitals for the past 72 hrs (Last 3 readings):   Weight   04/30/24 0441 81.5 kg (179 lb 10.8 oz)   04/29/24 0459 80.6 kg (177 lb 11.1 oz)   04/28/24 0545 80.2 kg (176 lb 12.9 oz)     Body mass index is 23.07 kg/m².      Intake/Output Summary (Last 24 hours) at 4/30/2024 1234  Last data filed at 4/30/2024 1103  Gross per 24 hour   Intake 560 ml   Output 1575 ml   Net -1015 ml       Hemodynamic Parameters:  CVP:  [9 mmHg] 9 mmHg    Telemetry: SR with PVC's       Physical Exam  Constitutional:       Appearance: He is ill-appearing.   HENT:      Head: Normocephalic and atraumatic.   Eyes:      Conjunctiva/sclera: Conjunctivae normal.      Pupils: Pupils are equal, round, and reactive to light.   Neck:      Comments: Do not appreciate elevated JVP  Cardiovascular:      Rate and Rhythm: Normal rate and regular rhythm.      Comments: Smooth VAD hum  Pulmonary:      Effort: Pulmonary effort is normal.      Comments: Lungs essentially CTA bilaterally  Abdominal:      General: Bowel sounds are normal.      Palpations: Abdomen is soft.   Musculoskeletal:         General: No swelling. Normal range of motion.      Cervical back: Normal range of motion and neck supple.   Skin:     General: Skin is warm and dry.      Capillary Refill: Capillary refill takes 2 to 3 seconds.   Neurological:      General: No focal deficit present.      Mental Status: He is alert and oriented to person, place, and time.            Significant Labs:  CBC:  Recent Labs   Lab 04/28/24  0522 04/29/24  0446 04/29/24  0451 04/30/24  0459   WBC 6.59  --  6.60 7.88   RBC 2.53*  --  2.27* 2.24*   HGB 6.9*  --  6.3* 6.4*   HCT 22.6* 40 20.4* 20.2*     --  308 312   MCV 89  --  90 90   MCH 27.3  --  27.8 28.6   MCHC 30.5*  --  30.9* 31.7*     BNP:  Recent Labs   Lab  04/24/24  0425 04/26/24  0400 04/29/24  0451   BNP 1,263* 1,562* 858*     CMP:  Recent Labs   Lab 04/24/24  0425 04/25/24  0432 04/26/24  0400 04/26/24  1423 04/28/24  0522 04/29/24  0451 04/30/24  0459   GLU 95   < > 103   < > 79 110 138*   CALCIUM 8.6*   < > 8.2*   < > 8.6* 8.4* 8.4*   ALBUMIN 2.5*  --  2.4*  --   --  2.5*  --    PROT 7.2  --  6.5  --   --  6.4  --    *   < > 138   < > 138 138 138   K 3.9   < > 3.9   < > 4.0 3.7 3.8   CO2 22*   < > 24   < > 23 25 24      < > 105   < > 103 104 104   BUN 57*   < > 27*   < > 29* 33* 38*   CREATININE 5.6*   < > 3.2*   < > 4.2* 4.1* 4.3*   ALKPHOS 201*  --  180*  --   --  146*  --    *  --  135*  --   --  63*  --    *  --  73*  --   --  22  --    BILITOT 0.2  --  0.2  --   --  0.2  --     < > = values in this interval not displayed.      Coagulation:   Recent Labs   Lab 04/28/24 0522 04/29/24 0451 04/30/24 0459   INR 1.9* 1.9* 1.9*   APTT 39.8* 39.5* 39.6*     LDH:  Recent Labs   Lab 04/28/24 0522 04/29/24 0451 04/30/24  0459    229 247     Microbiology:  Microbiology Results (last 7 days)       Procedure Component Value Units Date/Time    Blood culture [[id number]] Collected: 04/20/24 0234    Order Status: Completed Specimen: Blood Updated: 04/25/24 0612     Blood Culture, Routine No growth after 5 days.    Blood culture [[id number]] Collected: 04/20/24 0240    Order Status: Completed Specimen: Blood Updated: 04/25/24 0612     Blood Culture, Routine No growth after 5 days.            I have reviewed all pertinent labs within the past 24 hours.    Estimated Creatinine Clearance: 20.5 mL/min (A) (based on SCr of 4.3 mg/dL (H)).    Diagnostic Results:  I have reviewed and interpreted all pertinent imaging results/findings within the past 24 hours.

## 2024-04-30 NOTE — PROGRESS NOTES
04/30/2024  Monet Aguiar    Current provider:  Sandhya Price MD    Device interrogation:      4/30/2024     7:52 AM 4/30/2024     4:41 AM 4/29/2024    11:06 PM 4/29/2024     7:36 PM 4/29/2024     4:01 PM 4/29/2024    11:57 AM 4/29/2024     8:34 AM   TXP LVAD INTERROGATIONS   Type HeartMate3 HeartMate3 HeartMate3 HeartMate3 HeartMate3 HeartMate3 HeartMate3   Flow 4 3.9 3.8 4 4 3.9 3.8   Speed 5000 5000 5000 5050 5000 5000 5000   PI 4 4.2 4.6 3.8 4.2 4.9 4.8   Power ([place]) 3.5 3.4 3.5 3.4 3.4 3.5 3.4   LSL 4600   4600 4600 4600 4600   Pulsatility Pulse Pulse Pulse Pulse Pulse Pulse Pulse          Rounded on [facility] to ensure all mechanical assist device settings (IABP or VAD) were appropriate and all parameters were within limits.  I was able to ensure all back up equipment was present, the staff had no issues, and the Perfusion Department daily rounding was complete.      For implantable VADs: Interrogation of Ventricular assist device was performed with analysis of device parameters and review of device function. I have personally reviewed the interrogation findings and agree with findings as stated.     In emergency, the nursing units have been notified to contact the perfusion department either by:  Calling [phone] from 630am to 4pm Mon thru Fri, utilizing the On-Call Finder functionality of Epic and searching for Perfusion, or by contacting the hospital  from 4pm to 630am and on weekends and asking to speak with the perfusionist on call.    9:48 AM

## 2024-04-30 NOTE — PROGRESS NOTES
[facility]  Endocrinology  Progress Note    Admit Date: 4/16/2024     Reason for Consult: Management of T2DM, Hyperglycemia      Surgical Procedure and Date: n/a     Diabetes diagnosis year: 1998     Home Diabetes Medications:  Patient has not been taking any medications for diabetes since October 2023    How often checking glucose at home? Checking infrequently   BG readings on regimen: 120-150's  Hypoglycemia on the regimen?  No  Missed doses on regimen?  n/a     Diabetes Complications include:     Hyperglycemia     Complicating diabetes co morbidities:   CAD s/p CABG, HTN, HLD        HPI: Mr. Radha Abbott is a very pleasant 63 yo male with stage D HFrEF, ICMP with previous admission for ADHF/CS. He did undergo HM3 placement placed by Dr Washington on 12/1/23. Lengthy post op course complicated by vasoplegia(requiring Giaprezza), debility, malnutrition/impaired swallowing, and renal failure requiring HD(since weaned off). Once medically stable, he was transferred to Rehab for further PT/OT/ST. Of note, pt left with permacath still in place. He has done really well in rehab and now has continued to progress well at home. He is on a very high dose of Bumex 4 mg twice daily. VAD speed is at 5000 rpm. No VAD alarms noted on interrogation occasional PI events . Today he presents to the ED for infectious workup due to complaints of fevers of 101.5F associated with fatigue, generalized weakness, lower back pain and HA. He also reports a decline in appetite. Wife changes his driveline dressing every Saturday and has not noticed any purulance with dressing changes. Body aches/weakness started around 2am last night followed by fevers around 5pm. He has been taking tylenol which has helped brng down fevers but has not helped with HA. HA is 5/10 in intensity. He reports slight increase in body weight/ fluid retention yesterday for which he took a dose of Metolazone with a drop in weight by ~5lbs. Denies any  "SOB, LE swelling at this time. Endocrine consulted to manage hyperglycemia and type 2 diabetes.     Hemoglobin A1C   Date Value Ref Range Status   04/17/2024 7.4 (H) 4.0 - 5.6 % Final     Comment:     ADA Screening Guidelines:  5.7-6.4%  Consistent with prediabetes  >or=6.5%  Consistent with diabetes    High levels of fetal hemoglobin interfere with the HbA1C  assay. Heterozygous hemoglobin variants (HbS, HgC, etc)do  not significantly interfere with this assay.   However, presence of multiple variants may affect accuracy.     01/13/2024 5.1 4.0 - 5.6 % Final     Comment:     ADA Screening Guidelines:  5.7-6.4%  Consistent with prediabetes  >or=6.5%  Consistent with diabetes    High levels of fetal hemoglobin interfere with the HbA1C  assay. Heterozygous hemoglobin variants (HbS, HgC, etc)do  not significantly interfere with this assay.   However, presence of multiple variants may affect accuracy.     10/12/2023 7.6 (H) 4.0 - 5.6 % Final     Comment:     ADA Screening Guidelines:  5.7-6.4%  Consistent with prediabetes  >or=6.5%  Consistent with diabetes    High levels of fetal hemoglobin interfere with the HbA1C  assay. Heterozygous hemoglobin variants (HbS, HgC, etc)do  not significantly interfere with this assay.   However, presence of multiple variants may affect accuracy.           Interval HPI:   Overnight events: No acute events overnight. Patient in room 311/311 A. Blood glucose stable. BG at and above goal on current insulin regimen (SSI ). Steroid use- None.    Renal function- Abnormal - Creatinine 4.3   Vasopressors-  None       Endocrine will continue to follow and manage insulin orders inpatient.         Diet NPO Except for: Medication     Eating:   NPO  Nausea: No  Hypoglycemia and intervention: No  Fever: No  TPN and/or TF: Yes      BP (!) 100/0 (BP Location: Left arm, Patient Position: Lying)   Pulse 91   Temp 98.6 °F (37 °C) (Oral)   Resp 18   Ht 6' 2" (1.88 m)   Wt 81.5 kg (179 lb 10.8 oz)   " "SpO2 100%   BMI 23.07 kg/m²     Labs Reviewed and Include    Recent Labs   Lab 04/30/24  0459   *   CALCIUM 8.4*      K 3.8   CO2 24      BUN 38*   CREATININE 4.3*     Lab Results   Component Value Date    WBC 7.88 04/30/2024    HGB 6.4 (L) 04/30/2024    HCT 20.2 (L) 04/30/2024    MCV 90 04/30/2024     04/30/2024     No results for input(s): "TSH", "FREET4" in the last 168 hours.  Lab Results   Component Value Date    HGBA1C 7.4 (H) 04/17/2024       Nutritional status:   Body mass index is 23.07 kg/m².  Lab Results   Component Value Date    ALBUMIN 2.5 (L) 04/29/2024    ALBUMIN 2.4 (L) 04/26/2024    ALBUMIN 2.5 (L) 04/24/2024     Lab Results   Component Value Date    PREALBUMIN 30 04/29/2024    PREALBUMIN 30 04/26/2024    PREALBUMIN 29 04/24/2024       Estimated Creatinine Clearance: 20.5 mL/min (A) (based on SCr of 4.3 mg/dL (H)).    Accu-Checks  Recent Labs     04/27/24  1249 04/27/24  1717 04/27/24  1949 04/28/24  0820 04/28/24  1309 04/28/24  1955 04/29/24  0925 04/29/24  1154 04/29/24  1558 04/29/24  1935   POCTGLUCOSE 200* 173* 200* 138* 184* 199* 96 109 291* 183*       Current Medications and/or Treatments Impacting Glycemic Control  Immunotherapy:    Immunosuppressants       None          Steroids:   Hormones (From admission, onward)      None          Pressors:    Autonomic Drugs (From admission, onward)      None          Hyperglycemia/Diabetes Medications:   Antihyperglycemics (From admission, onward)      Start     Stop Route Frequency Ordered    04/17/24 0738  insulin aspart U-100 pen 0-5 Units         -- SubQ Before meals & nightly PRN 04/17/24 0638            ASSESSMENT and PLAN    Pulmonary  * Pneumonia due to COVID-19 virus  Infection may elevate BG readings  On IV antibiotics  Managed per primary team  Avoid hypoglycemia        Cardiac/Vascular  LVAD (left ventricular assist device) present  Managed per primary team  Avoid hypoglycemia        Hypertension  Uncontrolled " HTN can worsen insulin resistance.     Endocrine  Type 2 diabetes mellitus without complication, without long-term current use of insulin  Endocrinology consulted for BG management.   BG goal 140-180    - Novolog (Insulin Aspart) prn for BG excursions LDC SSI (150/50)  - BG checks AC/HS  - Hypoglycemia protocol in place    ** Please notify Endocrine for any change and/or advance in diet**  ** Please call Endocrine for any BG related issues **    Discharge Planning:   TBD. Please notify endocrinology prior to discharge.             Erasmo Parrish, DNP, FNP  Endocrinology  [facility]

## 2024-04-30 NOTE — SUBJECTIVE & OBJECTIVE
"Interval HPI:   Overnight events: No acute events overnight. Patient in room 311/311 A. Blood glucose stable. BG at and above goal on current insulin regimen (SSI ). Steroid use- None.    Renal function- Abnormal - Creatinine 4.3   Vasopressors-  None       Endocrine will continue to follow and manage insulin orders inpatient.         Diet NPO Except for: Medication     Eating:   NPO  Nausea: No  Hypoglycemia and intervention: No  Fever: No  TPN and/or TF: Yes      BP (!) 100/0 (BP Location: Left arm, Patient Position: Lying)   Pulse 91   Temp 98.6 °F (37 °C) (Oral)   Resp 18   Ht 6' 2" (1.88 m)   Wt 81.5 kg (179 lb 10.8 oz)   SpO2 100%   BMI 23.07 kg/m²     Labs Reviewed and Include    Recent Labs   Lab 04/30/24  0459   *   CALCIUM 8.4*      K 3.8   CO2 24      BUN 38*   CREATININE 4.3*     Lab Results   Component Value Date    WBC 7.88 04/30/2024    HGB 6.4 (L) 04/30/2024    HCT 20.2 (L) 04/30/2024    MCV 90 04/30/2024     04/30/2024     No results for input(s): "TSH", "FREET4" in the last 168 hours.  Lab Results   Component Value Date    HGBA1C 7.4 (H) 04/17/2024       Nutritional status:   Body mass index is 23.07 kg/m².  Lab Results   Component Value Date    ALBUMIN 2.5 (L) 04/29/2024    ALBUMIN 2.4 (L) 04/26/2024    ALBUMIN 2.5 (L) 04/24/2024     Lab Results   Component Value Date    PREALBUMIN 30 04/29/2024    PREALBUMIN 30 04/26/2024    PREALBUMIN 29 04/24/2024       Estimated Creatinine Clearance: 20.5 mL/min (A) (based on SCr of 4.3 mg/dL (H)).    Accu-Checks  Recent Labs     04/27/24  1249 04/27/24  1717 04/27/24  1949 04/28/24  0820 04/28/24  1309 04/28/24  1955 04/29/24  0925 04/29/24  1154 04/29/24  1558 04/29/24  1935   POCTGLUCOSE 200* 173* 200* 138* 184* 199* 96 109 291* 183*       Current Medications and/or Treatments Impacting Glycemic Control  Immunotherapy:    Immunosuppressants       None          Steroids:   Hormones (From admission, onward)      None      "     Pressors:    Autonomic Drugs (From admission, onward)      None          Hyperglycemia/Diabetes Medications:   Antihyperglycemics (From admission, onward)      Start     Stop Route Frequency Ordered    04/17/24 0738  insulin aspart U-100 pen 0-5 Units         -- SubQ Before meals & nightly PRN 04/17/24 0638

## 2024-04-30 NOTE — PROGRESS NOTES
[facility]  Nephrology  Progress Note    Patient Name: Radha Abbott  MRN: [id number]  Admission Date: 4/16/2024  Hospital Length of Stay: 14 days  Attending Provider: Sandhya Price MD   Primary Care Physician: Vasu Kong MD  Principal Problem:Pneumonia due to COVID-19 virus    Subjective:     HPI:  63 yo male with stage D HFrEF, ICMP with previous admission for ADHF/CS. S/p LVAD placement 12/2023 Lengthy post op course complicated by vasoplegia(requiring Giaprezza), debility, malnutrition/impaired swallowing, and renal failure requiring HD(since weaned off). presents to the ED for infectious workup due to complaints of fevers of 101.5F. Patient receiving abx, for presumed endocarditis/bactermia. Hospital course complicated by covid positive and oliguric IVÁN.     Concerning renal hx, patient following with [facility] nephrology Dr. Mendez in nephrology clinic. Baseline Scr ~4 with CKD IV/V, poor reserve.     Nephrology consulted for IVÁN    Interval History:   No acute events overnight, sCr 4.3, UOP 1600ml/24h, no swelling in extremities.    Review of patient's allergies indicates:  No Known Allergies  Current Facility-Administered Medications   Medication Dose Route Frequency Provider Last Rate Last Admin    acetaminophen tablet 1,000 mg  1,000 mg Oral Q8H PRN Lucy Brand MD   1,000 mg at 04/30/24 0434    amiodarone tablet 400 mg  400 mg Oral Daily Lucy Brand MD   400 mg at 04/30/24 0914    ampicillin (OMNIPEN) 2 g in sodium chloride 0.9 % 100 mL IVPB (MB+)  2 g Intravenous Q12H Axel Caban MD   Stopped at 04/30/24 0911    bumetanide tablet 4 mg  4 mg Oral BID loop Angela Shen MD   4 mg at 04/30/24 0913    cefTRIAXone (ROCEPHIN) 2 g in dextrose 5 % in water (D5W) 100 mL IVPB (MB+)  2 g Intravenous Q12H NnsuuAxel MD   Stopped at 04/30/24 0950    dextrose 10% bolus 125 mL 125 mL  12.5 g Intravenous PRN Erasmo Parrish, DNP, FNP        dextrose 10% bolus  250 mL 250 mL  25 g Intravenous PRN Erasmo Parrish DNP, COREEN        famotidine tablet 20 mg  20 mg Oral Daily Lucy Brand MD   20 mg at 04/30/24 0914    gabapentin capsule 100 mg  100 mg Oral BID Javed Vivas MD   100 mg at 04/30/24 0914    glucagon (human recombinant) injection 1 mg  1 mg Intramuscular PRN Erasmo Parrish DNP, FNP        glucose chewable tablet 16 g  16 g Oral PRN Erasmo Parrish DNP, FNP        glucose chewable tablet 24 g  24 g Oral PRN Erasmo Parrish DNP, FNP        hydrALAZINE tablet 100 mg  100 mg Oral Q8H AcAngela palmer MD   100 mg at 04/30/24 0503    insulin aspart U-100 pen 0-5 Units  0-5 Units Subcutaneous QID (AC + HS) Erasmo Lopez DNP, FNP        magnesium oxide tablet 400 mg  400 mg Oral Daily Lucy Brand MD   400 mg at 04/30/24 0913    senna-docusate 8.6-50 mg per tablet 2 tablet  2 tablet Oral Daily PRN Lucy Brand MD        sodium bicarbonate tablet 325 mg  325 mg Oral TID Lucy Brand MD   325 mg at 04/30/24 0913    trazodone split tablet 25 mg  25 mg Oral QHS Lucy Brand MD   25 mg at 04/29/24 2051    venlafaxine tablet 37.5 mg  37.5 mg Oral Daily Lucy Brand MD   37.5 mg at 04/30/24 0913    warfarin (COUMADIN) tablet 2.5 mg  2.5 mg Oral Once per day on Sunday Monday Wednesday Friday Saturday Sandhya Price MD   2.5 mg at 04/29/24 1605    warfarin split tablet 3.75 mg  3.75 mg Oral Once per day on Tuesday Thursday Sandhya Price MD           Objective:     Vital Signs (Most Recent):  Temp: 97.3 °F (36.3 °C) (04/30/24 1221)  Pulse: 87 (04/30/24 1221)  Resp: 17 (04/30/24 1221)  BP: (!) 82/0 (04/30/24 1240)  SpO2: 96 % (04/30/24 1221) Vital Signs (24h Range):  Temp:  [97.3 °F (36.3 °C)-98.7 °F (37.1 °C)] 97.3 °F (36.3 °C)  Pulse:  [86-94] 87  Resp:  [16-18] 17  SpO2:  [95 %-100 %] 96 %  BP: ()/(0-80) 82/0     Weight: 81.5 kg (179 lb 10.8 oz) (04/30/24 0441)  Body mass index is 23.07  kg/m².  Body surface area is 2.06 meters squared.    I/O last 3 completed shifts:  In: 1276 [P.O.:1276]  Out: 2225 [Urine:2225]     Physical Exam  Constitutional:       Appearance: Normal appearance.   Pulmonary:      Effort: Pulmonary effort is normal. No respiratory distress.   Musculoskeletal:      Right lower leg: No edema.      Left lower leg: No edema.   Neurological:      General: No focal deficit present.      Mental Status: He is alert and oriented to person, place, and time.          Significant Labs:  BMP:   Recent Labs   Lab 04/30/24  0459   *      K 3.8      CO2 24   BUN 38*   CREATININE 4.3*   CALCIUM 8.4*   MG 1.7     CBC:   Recent Labs   Lab 04/30/24  0459   WBC 7.88   RBC 2.24*   HGB 6.4*   HCT 20.2*      MCV 90   MCH 28.6   MCHC 31.7*        Significant Imaging:  Labs: Reviewed  Assessment/Plan:     Renal/  IVÁN (acute kidney injury)  Anuric IVÁN on baseline CKD IV  Baseline Scr ~4; Scr at time of consult ~8.3  UA Trace protein; bland otherwise.   UPCR 0.61g  Renal US: not avaialbe. In 2023 showed MRD.  Suspect ATN given rapid increase in Scr and anuria, now making urine.  Patient with prior CKD V poor reserve. Chart reviewed in depth, no inciting cause of IVÁN (normal vanc troughs, no obvious nephrotoxins, normal CPK). Suspect insult occurred in setting of COVID/bacteremia.     Plan;  -No need of TDC placement at this time, good UOP, sCR is stable at 3.8-4.2. Will need a close follow with Nephrologist as outpatient (2 weeks).  - Keep on Bumax 4mg BID  -HgB optimization, will need Procrit  units/kg/week  -Renal diet, low Na < 2g/day, fluids 35 ounces/day  - renally dose all medications  - avoid nephrotoxins as much as possible        Thank you for your consult. I will follow-up with patient. Please contact us if you have any additional questions.    Kamilla Angela MD  Nephrology  [facility]

## 2024-04-30 NOTE — PROGRESS NOTES
[facility] - Cardiology Stepdown  Heart Transplant  Progress Note    Patient Name: Radha Abbott  MRN: [id number]  Admission Date: 4/16/2024  Hospital Length of Stay: 14 days  Attending Physician: Sandhya Price MD  Primary Care Provider: Vasu Kong MD  Principal Problem:Pneumonia due to COVID-19 virus    Subjective:   Interval History:  Patient reports no new complaints this morning.  Patient's UOP has been 1.6L in the last 24 hours.  HE is -639cc total.  CVP: 9, SVO2: 56, CO: 7.5, CI: 3.64 and SVR: 778.  Per Nephrology, he does not need dialysis line on discharge.  IR consulted and plan for tunneled PICC placement today.  He would like to go home after line placement.  Plan is to discharge on 6 weeks of Rocephin and Ampicillin for Enterococcus Bacteremia.     Lines:  Right IJ: 4/21/24    Continuous Infusions:  Current Facility-Administered Medications   Medication Dose Route Frequency Last Rate Last Admin     Scheduled Meds:  Current Facility-Administered Medications   Medication Dose Route Frequency    amiodarone  400 mg Oral Daily    ampicillin IV (PEDS and ADULTS)  2 g Intravenous Q12H    bumetanide  4 mg Oral BID loop    cefTRIAXone (Rocephin) IV (PEDS and ADULTS)  2 g Intravenous Q12H    famotidine  20 mg Oral Daily    gabapentin  100 mg Oral BID    hydrALAZINE  100 mg Oral Q8H    magnesium oxide  400 mg Oral Daily    sodium bicarbonate  325 mg Oral TID    traZODone  25 mg Oral QHS    venlafaxine  37.5 mg Oral Daily    warfarin  2.5 mg Oral Once per day on Sunday Monday Wednesday Friday Saturday    warfarin  3.75 mg Oral Once per day on Tuesday Thursday     PRN Meds:  Current Facility-Administered Medications:     acetaminophen, 1,000 mg, Oral, Q8H PRN    dextrose 10%, 12.5 g, Intravenous, PRN    dextrose 10%, 25 g, Intravenous, PRN    glucagon (human recombinant), 1 mg, Intramuscular, PRN    glucose, 16 g, Oral, PRN    glucose, 24 g, Oral, PRN    insulin aspart U-100, 0-5 Units, Subcutaneous, QID (AC  + HS) PRN    senna-docusate 8.6-50 mg, 2 tablet, Oral, Daily PRN    Review of patient's allergies indicates:  No Known Allergies  Objective:     Vital Signs (Most Recent):  Temp: 97.3 °F (36.3 °C) (04/30/24 1221)  Pulse: 87 (04/30/24 1221)  Resp: 17 (04/30/24 1221)  BP: (!) 78/0 (04/30/24 0804)  SpO2: 96 % (04/30/24 1221) Vital Signs (24h Range):  Temp:  [97.3 °F (36.3 °C)-98.7 °F (37.1 °C)] 97.3 °F (36.3 °C)  Pulse:  [86-94] 87  Resp:  [16-18] 17  SpO2:  [95 %-100 %] 96 %  BP: ()/(0-80) 78/0     Patient Vitals for the past 72 hrs (Last 3 readings):   Weight   04/30/24 0441 81.5 kg (179 lb 10.8 oz)   04/29/24 0459 80.6 kg (177 lb 11.1 oz)   04/28/24 0545 80.2 kg (176 lb 12.9 oz)     Body mass index is 23.07 kg/m².      Intake/Output Summary (Last 24 hours) at 4/30/2024 1234  Last data filed at 4/30/2024 1103  Gross per 24 hour   Intake 560 ml   Output 1575 ml   Net -1015 ml       Hemodynamic Parameters:  CVP:  [9 mmHg] 9 mmHg    Telemetry: SR with PVC's       Physical Exam  Constitutional:       Appearance: He is ill-appearing.   HENT:      Head: Normocephalic and atraumatic.   Eyes:      Conjunctiva/sclera: Conjunctivae normal.      Pupils: Pupils are equal, round, and reactive to light.   Neck:      Comments: Do not appreciate elevated JVP  Cardiovascular:      Rate and Rhythm: Normal rate and regular rhythm.      Comments: Smooth VAD hum  Pulmonary:      Effort: Pulmonary effort is normal.      Comments: Lungs essentially CTA bilaterally  Abdominal:      General: Bowel sounds are normal.      Palpations: Abdomen is soft.   Musculoskeletal:         General: No swelling. Normal range of motion.      Cervical back: Normal range of motion and neck supple.   Skin:     General: Skin is warm and dry.      Capillary Refill: Capillary refill takes 2 to 3 seconds.   Neurological:      General: No focal deficit present.      Mental Status: He is alert and oriented to person, place, and time.            Significant  Labs:  CBC:  Recent Labs   Lab 04/28/24 0522 04/29/24 0446 04/29/24 0451 04/30/24 0459   WBC 6.59  --  6.60 7.88   RBC 2.53*  --  2.27* 2.24*   HGB 6.9*  --  6.3* 6.4*   HCT 22.6* 40 20.4* 20.2*     --  308 312   MCV 89  --  90 90   MCH 27.3  --  27.8 28.6   MCHC 30.5*  --  30.9* 31.7*     BNP:  Recent Labs   Lab 04/24/24 0425 04/26/24  0400 04/29/24 0451   BNP 1,263* 1,562* 858*     CMP:  Recent Labs   Lab 04/24/24 0425 04/25/24 0432 04/26/24  0400 04/26/24  1423 04/28/24 0522 04/29/24 0451 04/30/24 0459   GLU 95   < > 103   < > 79 110 138*   CALCIUM 8.6*   < > 8.2*   < > 8.6* 8.4* 8.4*   ALBUMIN 2.5*  --  2.4*  --   --  2.5*  --    PROT 7.2  --  6.5  --   --  6.4  --    *   < > 138   < > 138 138 138   K 3.9   < > 3.9   < > 4.0 3.7 3.8   CO2 22*   < > 24   < > 23 25 24      < > 105   < > 103 104 104   BUN 57*   < > 27*   < > 29* 33* 38*   CREATININE 5.6*   < > 3.2*   < > 4.2* 4.1* 4.3*   ALKPHOS 201*  --  180*  --   --  146*  --    *  --  135*  --   --  63*  --    *  --  73*  --   --  22  --    BILITOT 0.2  --  0.2  --   --  0.2  --     < > = values in this interval not displayed.      Coagulation:   Recent Labs   Lab 04/28/24 0522 04/29/24 0451 04/30/24 0459   INR 1.9* 1.9* 1.9*   APTT 39.8* 39.5* 39.6*     LDH:  Recent Labs   Lab 04/28/24  0522 04/29/24  0451 04/30/24  0459    229 247     Microbiology:  Microbiology Results (last 7 days)       Procedure Component Value Units Date/Time    Blood culture [[id number]] Collected: 04/20/24 0234    Order Status: Completed Specimen: Blood Updated: 04/25/24 0612     Blood Culture, Routine No growth after 5 days.    Blood culture [[id number]] Collected: 04/20/24 0240    Order Status: Completed Specimen: Blood Updated: 04/25/24 0612     Blood Culture, Routine No growth after 5 days.            I have reviewed all pertinent labs within the past 24 hours.    Estimated Creatinine Clearance: 20.5 mL/min (A) (based on SCr of  4.3 mg/dL (H)).    Diagnostic Results:  I have reviewed and interpreted all pertinent imaging results/findings within the past 24 hours.  Assessment and Plan:     Mr. Radha Abbott is a very pleasant 63 yo male with stage D HFrEF, ICMP with previous admission for ADHF/CS. He did undergo HM3 placement placed by Dr Washington on 12/1/23. Lengthy post op course complicated by vasoplegia(requiring Giaprezza), debility, malnutrition/impaired swallowing, and renal failure requiring HD(since weaned off). Once medically stable, he was transferred to Rehab for further PT/OT/ST. Of note, pt left with permacath still in place. He has done really well in rehab and now has continued to progress well at home. He is on a very high dose of Bumex 4 mg twice daily. VAD speed is at 5000 rpm. No VAD alarms noted on interrogation occasional PI events . Today he presents to the ED for infectious workup due to complaints of fevers of 101.5F associated with fatigue, generalized weakness, lower back pain and HA. He also reports a decline in appetite. Wife changes his driveline dressing every Saturday and has not noticed any purulance with dressing changes. Body aches/weakness started around 2am last night followed by fevers around 5pm. He has been taking tylenol which has helped brng down fevers but has not helped with HA. HA is 5/10 in intensity. He reports slight increase in body weight/ fluid retention yesterday for which he took a dose of Metolazone with a drop in weight by ~5lbs. Denies any SOB, LE swelling at this time.      2D Echo with CFD done on 3/26/2024  LVAD: There is a Heartmate III LVAD running at 5000 RPM. The aortic valve does not open. The ventricular septum is at midline. Inflow cannula well seated at the apex. Outflow graft not visualized.    Left Ventricle: The left ventricle is moderately dilated. Normal wall thickness. Global hypokinesis present. Septal motion is abnormal. There is severely reduced systolic function with a  visually estimated ejection fraction of 5 - 10%. There is diastolic dysfunction but grade cannot be determined.    Right Ventricle: Mild right ventricular enlargement. Wall thickness is normal. Right ventricle wall motion  is normal. Systolic function is normal. Pacemaker lead present in the ventricle.    Left Atrium: Left atrium is severely dilated.    Right Atrium: Right atrium is moderately dilated.    Mitral Valve: The mitral valve is repaired with an Noble stitch. There is mild regurgitation.    IVC/SVC: Normal venous pressure at 3 mmHg.    * Pneumonia due to COVID-19 virus  -4/16/24 he presented to the ED for infectious workup due to complaints of fevers of 101.5F associated with fatigue, generalized weakness, lower back pain and HA that started this morning. No evidence of drive line infection. No history of line infection.   - COVID +ve  - CT chest/ abdomen / pelvis 4/16 showed new RML opacity  - S/p remdesavir.   - continue tylenol for fevers    Bacteremia  -blood cxs from 4/16 +ve for E faecalis   -ID following  -E faecalis is a known cause of endocarditis and AICD lead infections.   -TTE was negative for vegetation.  However TTE does not rule out valvular or lead vegetation. Will treat this as endocarditis with 6 weeks of IV antibiotics followed by suppressive therapy   -Currently on Ampicillin and Ceftriaxone for 6wk course as per ID. Estimated end date of therapy 5/30/24      LVAD (left ventricular assist device) present  -HeartMate 3 Implanted  12/1/2023  as DT  -Cont Coumadin, dosing by PharmD.  Goal INR 2.0-3.0 . Subtherapeutic today.   -Antiplatelets Not on ASA due to low H/H on implant   -LDH is stable overall today. Will continue to monitor daily.  -Speed set at  5000     -Interrogation notable for no events  -ECHO 4/18/24 with speed at 5000 EF: 10-15%, LVEDD: 6.1cm, RV size normal function mod reduced   -RHC 1/22/24 with RA 13, PCWP 22, PA 50/25, CO 5.8, CI 3.0   -Not listed for OHTx, declined  for OHTX due to comorbidities     Procedure: Device Interrogation Including analysis of device parameters  Current Settings: Ventricular Assist Device  Review of device function is stable/unstable stable        4/29/2024     8:34 AM 4/29/2024     4:17 AM 4/29/2024     1:00 AM 4/28/2024     7:49 PM 4/28/2024     4:47 PM 4/28/2024    11:31 AM 4/28/2024     8:06 AM   TXP LVAD INTERROGATIONS   Type HeartMate3 HeartMate3 HeartMate3 HeartMate3 HeartMate3 HeartMate3 HeartMate3   Flow 3.8 4.1 3.9 3.8 3.6 3.9 4   Speed 5000 5000 5000 5000 5000 5000 5000   PI 4.8 4.1 3.6 5.3 6.2 3.9 3.9   Power ([place]) 3.4 3.4 3.4 3.5 3.4 3.4 3.5   LSL 4600 4600        Pulsatility Pulse Pulse Pulse Pulse Pulse Pulse Pulse         Hyponatremia  -POA  -Daily labs       IVÁN (acute kidney injury)  Most likely 2/2 to ATN on CKD IV.      - As per nephrology, will decide on 4/29 whether patient will need HD moving forwards and will require a tunneled line placed.   - patient starting to produce urine. Creatinine stable.   - Will continue to monitor.  - Nephrology consulted. Appreciate recs.         Acute on chronic combined systolic and diastolic heart failure  -ICM  -Last 2D Echo  4/18/24 : LVEF 10-15%, LVEDD  6.1 cm  -Euvolemic on examination today. Continue home bumex 4mg bid.   -GDMT with Hydralazine  -2g Na dietary restriction, 1500 mL fluid restriction, strict I/Os      Ventricular tachycardia  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    Type 2 diabetes mellitus without complication, without long-term current use of insulin  -SSI    Anemia  - possibly secondary to dilutional anemia in the setting of hypervolemia.   -H/H is low but stable and patient is asymptomatic.     Hypertension  Continue hydralazine    Headache  C/o of HA on admit with no improvement despite tylenol  - CTH done 4/16 with no acute processes        DENIZ Faye  Heart Transplant  [facility] - Cardiology Stepdown

## 2024-04-30 NOTE — CONSULTS
Tunneled PICC Placement Consult Note  Interventional Radiology    Consult Requested By: Larissa Jesus PA  Reason for Consult: tunneled line placement for abx in patient who has creatinine of 4.3.  Nephrology does not feel that dialysis line is needed    SUBJECTIVE:     Chief Complaint:  E faecalis bacteremia    History of Present Illness:  Radha Abbott is a 64 y.o. male with a PMHx of CHF s/p LVAD placement on 12/1/23, CKD 4, T2DM and HTN who presented on 4/16/24 for fevers, fatigue, generalized weakness, lower back pain and HA; was found to have covid 19 PNA and was admitted for further treatment. Hospital course notable for IVÁN on CKD 4 requiring CRRT, E faecalis bacteremia, and chronic anemia (Hgb 6). Interventional Radiology has been consulted for tunneled PICC placement for long term antibiotics. Pt is not a candidate for PICC placement due to current IVÁN on CKD 4. Pt will need to be on ampicillin and ceftriaxone x 6 weeks (STANLEY 5/30/24) per ID. WBC is 7.88, last set of blood cultures on 4/20/24 revealed NGTD. The pt is hemodynamically stable. He takes warfarin due to LVAD status. Of note, Hgb 6.4 today. Per primary team, this is likely due to chronic losses occurring from CRRT, no plans for transfusion as pt is HDS and without signs of active bleeding.    Scheduled Meds:  Current Facility-Administered Medications   Medication Dose Route Frequency    amiodarone  400 mg Oral Daily    ampicillin IV (PEDS and ADULTS)  2 g Intravenous Q12H    bumetanide  4 mg Oral BID loop    cefTRIAXone (Rocephin) IV (PEDS and ADULTS)  2 g Intravenous Q12H    famotidine  20 mg Oral Daily    gabapentin  100 mg Oral BID    hydrALAZINE  100 mg Oral Q8H    magnesium oxide  400 mg Oral Daily    sodium bicarbonate  325 mg Oral TID    traZODone  25 mg Oral QHS    venlafaxine  37.5 mg Oral Daily    warfarin  2.5 mg Oral Once per day on Sunday Monday Wednesday Friday Saturday    warfarin  3.75 mg Oral Once per day on Tuesday Thursday      Continuous Infusions:  Current Facility-Administered Medications   Medication Dose Route Frequency Last Rate Last Admin     PRN Meds:  Current Facility-Administered Medications:     acetaminophen, 1,000 mg, Oral, Q8H PRN    dextrose 10%, 12.5 g, Intravenous, PRN    dextrose 10%, 25 g, Intravenous, PRN    glucagon (human recombinant), 1 mg, Intramuscular, PRN    glucose, 16 g, Oral, PRN    glucose, 24 g, Oral, PRN    insulin aspart U-100, 0-5 Units, Subcutaneous, QID (AC + HS) PRN    senna-docusate 8.6-50 mg, 2 tablet, Oral, Daily PRN    Review of patient's allergies indicates:  No Known Allergies    Past Medical History:   Diagnosis Date    CAD (coronary artery disease)     CHF (congestive heart failure)     Diabetes mellitus     HFrEF (heart failure with reduced ejection fraction)     ICD (implantable cardioverter-defibrillator) in place     MI, old      Past Surgical History:   Procedure Laterality Date    ANGIOPLASTY-VENOUS ARTERY Right 12/1/2023    Procedure: ANGIOPLASTY-VENOUS ARTERY, RIGHT FEMORAL;  Surgeon: Yuri Washington MD;  Location: [facility] OR [address] [place] [address];  Service: Cardiovascular;  Laterality: Right;    AORTIC VALVULOPLASTY N/A 12/1/2023    Procedure: REPAIR, AORTIC VALVE;  Surgeon: Yuri Washington MD;  [facility];  Service: Cardiovascular;  Laterality: N/A;    CARDIAC SURGERY      CLOSURE N/A 12/1/2023    Procedure: CLOSURE, TEMPORARY;  Surgeon: Yuri Washington MD;  [facility];  Service: Cardiovascular;  Laterality: N/A;    DRAINAGE OF PLEURAL EFFUSION  12/4/2023    Procedure: DRAINAGE, PLEURAL EFFUSION;  Surgeon: Yuri Washington MD;  [facility];  Service: Cardiovascular;;    INSERTION OF GRAFT TO PERICARDIUM  12/4/2023    Procedure: INSERTION, GRAFT, PERICARDIUM;  Surgeon: Yuri Washington MD;  [facility];  Service: Cardiovascular;;    INSERTION OF INTRA-AORTIC BALLOON ASSIST DEVICE Right 11/21/2023    Procedure: INSERTION, INTRA-AORTIC BALLOON  PUMP;  Surgeon: Finn Cohn MD;  Location: [facility] CATH LAB;  Service: Cardiology;  Laterality: Right;    LEFT VENTRICULAR ASSIST DEVICE Left 12/1/2023    Procedure: INSERTION-LEFT VENTRICULAR ASSIST DEVICE;  Surgeon: Yuri Washington MD;  Location: [facility] OR [facility] FLR;  Service: Cardiovascular;  Laterality: Left;  REDO STERNOTOMY - REDO SAW NEEDED FOR CASE    LYSIS OF ADHESIONS  12/1/2023    Procedure: LYSIS, ADHESIONS;  Surgeon: Yuri Washington MD;  [facility];  Service: Cardiovascular;;    PLACEMENT OF SWAN ROLANDO CATHETER WITH IMAGING GUIDANCE  11/20/2023    Procedure: INSERTION, CATHETER, SWAN-ROLANDO, WITH IMAGING GUIDANCE;  Surgeon: Sajan Hurley MD;  Location: [facility] CATH LAB;  Service: Cardiology;;    REMOVAL OF TUNNELED CENTRAL VENOUS CATHETER (CVC) N/A 3/1/2024    Procedure: REMOVAL, CATHETER, CENTRAL VENOUS, TUNNELED;  Surgeon: Seble Aguilar MD;  Location: [facility] CATH LAB;  Service: Interventional Nephrology;  Laterality: N/A;    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 12/1/2023    Procedure: REPAIR, ANEURYSM, ARTERY, FEMORAL;  Surgeon: Yuri Washignton MD;  [facility];  Service: Cardiovascular;  Laterality: Right;  Right Femoral Artery Repair    RIGHT HEART CATHETERIZATION Right 10/10/2023    Procedure: INSERTION, CATHETER, RIGHT HEART;  Surgeon: Bin Gandhi MD;  Location: [facility] CATH LAB;  Service: Cardiology;  Laterality: Right;    RIGHT HEART CATHETERIZATION Right 10/13/2023    Procedure: INSERTION, CATHETER, RIGHT HEART;  Surgeon: Walter Mcintyre MD;  Location: [facility] CATH LAB;  Service: Cardiology;  Laterality: Right;    RIGHT HEART CATHETERIZATION  11/13/2023    RIGHT HEART CATHETERIZATION Right 11/13/2023    Procedure: INSERTION, CATHETER, RIGHT HEART;  Surgeon: Juventino Bermudez Jr., MD;  Location: [facility] CATH LAB;  Service: Cardiology;  Laterality: Right;    RIGHT HEART CATHETERIZATION Right 11/20/2023    Procedure: INSERTION, CATHETER, RIGHT HEART;   Surgeon: Sajan Hurley MD;  Location: [facility] CATH LAB;  Service: Cardiology;  Laterality: Right;    RIGHT HEART CATHETERIZATION Right 1/22/2024    Procedure: INSERTION, CATHETER, RIGHT HEART;  Surgeon: Brayan Ocampo MD;  Location: [facility] CATH LAB;  Service: Cardiology;  Laterality: Right;    STERNAL WOUND CLOSURE N/A 12/4/2023    Procedure: CLOSURE, WOUND, STERNUM;  Surgeon: Yuri Washington MD;  Location: [facility] OR [facility] FLR;  Service: Cardiovascular;  Laterality: N/A;    STERNOTOMY N/A 12/1/2023    Procedure: STERNOTOMY, REDO;  Surgeon: Yuri Washington MD;  Location: [facility] OR [facility] FLR;  Service: Cardiovascular;  Laterality: N/A;    VALVULOPLASTY, MITRAL VALVE N/A 12/1/2023    Procedure: VALVULOPLASTY, MITRAL VALVE;  Surgeon: Yuri Washington MD;  [facility];  Service: Cardiovascular;  Laterality: N/A;     No family history on file.  Social History     Tobacco Use    Smoking status: Former     Current packs/day: 0.50     Types: Cigarettes   Substance Use Topics    Alcohol use: Yes     Comment: rarely    Drug use: No       OBJECTIVE:     Vital Signs (Most Recent)  Temp: 98.7 °F (37.1 °C) (04/30/24 0803)  Pulse: 90 (04/30/24 0803)  Resp: 16 (04/30/24 0803)  BP: (!) 78/0 (04/30/24 0804)  SpO2: 95 % (04/30/24 0803)    Physical Exam:  Physical Exam  Vitals and nursing note reviewed.   Constitutional:       General: He is not in acute distress.     Appearance: He is not ill-appearing.   HENT:      Head: Normocephalic and atraumatic.   Eyes:      Extraocular Movements: Extraocular movements intact.      Conjunctiva/sclera: Conjunctivae normal.      Pupils: Pupils are equal, round, and reactive to light.   Neck:      Comments: R IJ trialysis  Cardiovascular:      Rate and Rhythm: Normal rate.   Pulmonary:      Effort: Pulmonary effort is normal. No respiratory distress.   Abdominal:      General: Abdomen is flat. There is no distension.      Comments: LVAD driveline   Skin:     General: Skin is warm and dry.       Coloration: Skin is not jaundiced.   Neurological:      General: No focal deficit present.      Mental Status: He is alert and oriented to person, place, and time.   Psychiatric:         Mood and Affect: Mood normal.         Behavior: Behavior normal.         Thought Content: Thought content normal.         Judgment: Judgment normal.         Laboratory  I have reviewed all pertinent lab results within the past 24 hours.  CBC:   Recent Labs   Lab 04/30/24 0459   WBC 7.88   RBC 2.24*   HGB 6.4*   HCT 20.2*      MCV 90   MCH 28.6   MCHC 31.7*     BMP:   Recent Labs   Lab 04/30/24 0459   *      K 3.8      CO2 24   BUN 38*   CREATININE 4.3*   CALCIUM 8.4*   MG 1.7     CMP:   Recent Labs   Lab 04/29/24 0451 04/30/24 0459    138*   CALCIUM 8.4* 8.4*   ALBUMIN 2.5*  --    PROT 6.4  --     138   K 3.7 3.8   CO2 25 24    104   BUN 33* 38*   CREATININE 4.1* 4.3*   ALKPHOS 146*  --    ALT 63*  --    AST 22  --    BILITOT 0.2  --      LFTs:   Recent Labs   Lab 04/29/24 0451   ALT 63*   AST 22   ALKPHOS 146*   BILITOT 0.2   PROT 6.4   ALBUMIN 2.5*     Coagulation:   Recent Labs   Lab 04/30/24 0459   LABPROT 19.6*   INR 1.9*   APTT 39.6*     Microbiology Results (last 7 days)       Procedure Component Value Units Date/Time    Blood culture [[id number]] Collected: 04/20/24 0234    Order Status: Completed Specimen: Blood Updated: 04/25/24 0612     Blood Culture, Routine No growth after 5 days.    Blood culture [[id number]] Collected: 04/20/24 0240    Order Status: Completed Specimen: Blood Updated: 04/25/24 0612     Blood Culture, Routine No growth after 5 days.            ASA/Mallampati  ASA: 3  Mallampati: 2    Imaging:  Recent imaging studies reviewed.     ASSESSMENT/PLAN:     Assessment:  64 y.o. male with a PMHx of CHF s/p LVAD placement on 12/1/23, CKD 4, T2DM and HTN who has been referred to IR for tunneled PICC placement for long term antibiotics. The procedure was  discussed in great detail with the patient including thorough explanations of the potential risks and benefits of tunneled PICC placement. Risks include bleeding at the puncture site, infection, catheter related thrombus, catheter dysfunction, and central vein stenosis. The patient is a candidate for tunneled PICC placement under local anesthesia with IV fentanyl only. Of note, pt's procedure will occur at the end of the day due to his positive covid 19 status, pt and primary team aware. Plan discussed with ordering physician.The pt verbalized understanding of the plan and would like to proceed.    Plan:  Will proceed with tunneled PICC placement under local anesthesia with IV fentanyl only on 4/30/24.   No need for NPO status, pt cleared to eat/drink while awaiting procedure  Anticoagulation history reviewed. Pt on warfarin, ok per staff  Coagulation labs reviewed. Hgb 6.4. Discussed with primary team and IR staff. Ok to proceed without blood transfusion.   Thank you for the consult. Please contact with questions via [employer] secure chat or spectra.    Basilia Hastings PA-C  Interventional Radiology  Spectra: [phone]

## 2024-04-30 NOTE — PROGRESS NOTES
Update/Discharge Note:    Possible dc later today per team. Pt is pending line placement at this time.     CRISTAL contacted Bessie with [facility] to update on dc plans.  Bessie advised he will have meds delivered today for possible dc.    CRISTAL contacted [facility] to notify of dc plans.  Spoke with Jeni.  Pt to be seen by [facility] on Thursday for admit.    CRISTAL spoke with pt's wife to discuss dc plans. Pt's wife advised that pt may be dc home later today depending on dc time.  SW updated wife on SW notifying [facility] of potential dc in order for meds to be sent.   Pt's wife voiced understanding and agreement with dc plans.     No other needs reported at this time.    [facility] [phone]  [facility] [phone]

## 2024-04-30 NOTE — NURSING
R. IJ trialysis catheter removed per Bashir Randle MD order. Sutures removed, held pressure for 10 minutes, pt remained supine for 15 minutes. Site with no bleeding or hematoma, covered with gauze and Tegaderm.

## 2024-04-30 NOTE — NURSING TRANSFER
Nursing Transfer Note      4/30/2024   3:34 PM    Reason patient is being transferred: Tunneled cath insertion    Transfer From: 311    Transfer via stretcher    Transfer with cardiac monitoring    Telemetry: Box Number [id number]  Order for Tele Monitor? Yes    Any special needs or follow-up needed: LVAD care, on batteries, emergency bag with pt.     Patient belongings transferred with patient: No    Chart send with patient: No    Notified: spouse

## 2024-04-30 NOTE — NURSING
Patient arrived to the unit s/p tunneled PICC placement. Telemetry box applied and vital signs documented in flow sheet. Oriented to room, call bell with in reach, bed is in low lock position. VAD equipment with pt, inventory completed.

## 2024-04-30 NOTE — PROGRESS NOTES
DISCHARGE NOTE:    Radha Abbott is a 64 y.o. male s/p HM3 from 12.1.23 admitted for evaluation of fevers, weakness, and fatigue.     Past Medical History:   Diagnosis Date    CAD (coronary artery disease)     CHF (congestive heart failure)     Diabetes mellitus     HFrEF (heart failure with reduced ejection fraction)     ICD (implantable cardioverter-defibrillator) in place     MI, old        Hospital Course: During his hospital stay Mr. Abbott was evaluated by the ID team. He was started on broad spectrum antibiotics. Blood cultures demonstrated growth of enterococcus faecalis. His antibiotic regimen was narrowed to ceftriaxone/ampicillin with eot estimated 5.30.     His inr today is 1.9. He will be able to discharge today following picc line insertion for home abx administration. Plan to continue previous home warfarin dose (3.75mg TuTh; 2.5mg other days).     Pharmacy Interventions/Recommendations:  1) INR Goal: 2-3    2) Antiplatelet Agents: None    3) Heparin Bridging:  UFH    4) INR Follow-Up/Discharge Needs:  Thursday with coumadin clinic    See list of discharge medication for dosing instructions.     Radha Abbott and his caregiver verbalized their understanding and had the opportunity to ask questions.      Discharge Medications:     Medication List        START taking these medications      dextrose 5 % in water (D5W) PgBk 100 mL with cefTRIAXone 2 gram SolR 2 g  Inject 2 g into the vein every 12 (twelve) hours.     sodium chloride 0.9 % PgBk 100 mL with ampicillin 2 gram SolR 2 g  Inject 2 g into the vein every 12 (twelve) hours.            CHANGE how you take these medications      hydrALAZINE 100 MG tablet  Commonly known as: APRESOLINE  Take 1 tablet (100 mg total) by mouth every 8 (eight) hours.  What changed:   medication strength  how much to take     warfarin 2.5 MG tablet  Commonly known as: COUMADIN  Take 3.75mg orally daily on Tuesday and Thursdays. Take 2.5mg orally daily on all other days.  What  changed: See the new instructions.            CONTINUE taking these medications      acetaminophen 500 MG tablet  Commonly known as: TYLENOL  Take 2 tablets (1,000 mg total) by mouth every 8 (eight) hours as needed for Pain.     amiodarone 400 MG tablet  Commonly known as: PACERONE  Take 1 tablet (400 mg total) by mouth once daily.     bumetanide 2 MG tablet  Commonly known as: BUMEX  Take 2 tablets (4 mg total) by mouth 2 (two) times a day.     famotidine 20 MG tablet  Commonly known as: PEPCID  Take 1 tablet (20 mg total) by mouth once daily.     fluticasone propionate 50 mcg/actuation nasal spray  Commonly known as: FLONASE  2 sprays (100 mcg total) by Each Nostril route once daily.     gabapentin 100 MG capsule  Commonly known as: NEURONTIN  Take 1 capsule (100 mg total) by mouth 2 (two) times daily.     magnesium oxide 400 mg (241.3 mg magnesium) tablet  Commonly known as: MAG-OX  Take 1 tablet (400 mg total) by mouth once daily.     metOLazone 5 MG tablet  Commonly known as: ZAROXOLYN  Take 1 tablet (5 mg total) by mouth once weekly as needed on Wednesdays only if >5lb weight gain in 1 week     omega-3 acid ethyl esters 1 gram capsule  Commonly known as: LOVAZA  Take 2 capsules (2 g total) by mouth 2 (two) times daily.     senna-docusate 8.6-50 mg 8.6-50 mg per tablet  Commonly known as: PERICOLACE  Take 2 tablets by mouth daily as needed for Constipation.     sodium bicarbonate 325 MG tablet  Take 1 tablet (325 mg total) by mouth 3 (three) times daily.     traZODone 50 MG tablet  Commonly known as: DESYREL  Take 0.5 tablets (25 mg total) by mouth every evening.     venlafaxine 37.5 MG Tab  Commonly known as: EFFEXOR  Take 1 tablet (37.5 mg total) by mouth once daily.     vitamin D 1000 units Tab  Commonly known as: VITAMIN D3  Take 1 tablet (1,000 Units total) by mouth once daily.               Where to Get Your Medications        These medications were sent to [facility] [address] [place] [address]  JULIA DUARTE  [address] [address]      Phone: [phone]   hydrALAZINE 100 MG tablet       Information about where to get these medications is not yet available    Ask your nurse or doctor about these medications  dextrose 5 % in water (D5W) PgBk 100 mL with cefTRIAXone 2 gram SolR 2 g  sodium chloride 0.9 % PgBk 100 mL with ampicillin 2 gram SolR 2 g  warfarin 2.5 MG tablet

## 2024-04-30 NOTE — SUBJECTIVE & OBJECTIVE
Interval History:   No acute events overnight, sCr 4.3, UOP 1600ml/24h, no swelling in extremities.    Review of patient's allergies indicates:  No Known Allergies  Current Facility-Administered Medications   Medication Dose Route Frequency Provider Last Rate Last Admin    acetaminophen tablet 1,000 mg  1,000 mg Oral Q8H PRN Lucy Brand MD   1,000 mg at 04/30/24 0434    amiodarone tablet 400 mg  400 mg Oral Daily Lucy Brand MD   400 mg at 04/30/24 0914    ampicillin (OMNIPEN) 2 g in sodium chloride 0.9 % 100 mL IVPB (MB+)  2 g Intravenous Q12H Axel Caban MD   Stopped at 04/30/24 0911    bumetanide tablet 4 mg  4 mg Oral BID loop Angela Shen MD   4 mg at 04/30/24 0913    cefTRIAXone (ROCEPHIN) 2 g in dextrose 5 % in water (D5W) 100 mL IVPB (MB+)  2 g Intravenous Q12H Axel Caban MD   Stopped at 04/30/24 0950    dextrose 10% bolus 125 mL 125 mL  12.5 g Intravenous PRN Erasmo Parrish DNP, FNP        dextrose 10% bolus 250 mL 250 mL  25 g Intravenous PRN Erasmo Parrish DNP, FNP        famotidine tablet 20 mg  20 mg Oral Daily Lucy Brand MD   20 mg at 04/30/24 0914    gabapentin capsule 100 mg  100 mg Oral BID Javed Vivas MD   100 mg at 04/30/24 0914    glucagon (human recombinant) injection 1 mg  1 mg Intramuscular PRN Erasmo Parrish DNP, FNP        glucose chewable tablet 16 g  16 g Oral PRN Erasmo Parrish DNP, FNP        glucose chewable tablet 24 g  24 g Oral PRN Erasmo Parrish DNP, FNP        hydrALAZINE tablet 100 mg  100 mg Oral Q8H Angela Shen MD   100 mg at 04/30/24 0503    insulin aspart U-100 pen 0-5 Units  0-5 Units Subcutaneous QID (AC + HS) PRN Erasmo Parrish DNP, FNP        magnesium oxide tablet 400 mg  400 mg Oral Daily Lucy Brand MD   400 mg at 04/30/24 0913    senna-docusate 8.6-50 mg per tablet 2 tablet  2 tablet Oral Daily PRN Lucy Brand MD        sodium bicarbonate tablet 325 mg  325 mg  Oral TID Lucy Brand MD   325 mg at 04/30/24 0913    trazodone split tablet 25 mg  25 mg Oral QHS Lucy Brand MD   25 mg at 04/29/24 2051    venlafaxine tablet 37.5 mg  37.5 mg Oral Daily Lucy Brand MD   37.5 mg at 04/30/24 0913    warfarin (COUMADIN) tablet 2.5 mg  2.5 mg Oral Once per day on Sunday Monday Wednesday Friday Saturday Sandhya Price MD   2.5 mg at 04/29/24 1605    warfarin split tablet 3.75 mg  3.75 mg Oral Once per day on Tuesday Thursday Sandhya Price MD           Objective:     Vital Signs (Most Recent):  Temp: 97.3 °F (36.3 °C) (04/30/24 1221)  Pulse: 87 (04/30/24 1221)  Resp: 17 (04/30/24 1221)  BP: (!) 82/0 (04/30/24 1240)  SpO2: 96 % (04/30/24 1221) Vital Signs (24h Range):  Temp:  [97.3 °F (36.3 °C)-98.7 °F (37.1 °C)] 97.3 °F (36.3 °C)  Pulse:  [86-94] 87  Resp:  [16-18] 17  SpO2:  [95 %-100 %] 96 %  BP: ()/(0-80) 82/0     Weight: 81.5 kg (179 lb 10.8 oz) (04/30/24 0441)  Body mass index is 23.07 kg/m².  Body surface area is 2.06 meters squared.    I/O last 3 completed shifts:  In: 1276 [P.O.:1276]  Out: 2225 [Urine:2225]     Physical Exam  Constitutional:       Appearance: Normal appearance.   Pulmonary:      Effort: Pulmonary effort is normal. No respiratory distress.   Musculoskeletal:      Right lower leg: No edema.      Left lower leg: No edema.   Neurological:      General: No focal deficit present.      Mental Status: He is alert and oriented to person, place, and time.          Significant Labs:  BMP:   Recent Labs   Lab 04/30/24  0459   *      K 3.8      CO2 24   BUN 38*   CREATININE 4.3*   CALCIUM 8.4*   MG 1.7     CBC:   Recent Labs   Lab 04/30/24  0459   WBC 7.88   RBC 2.24*   HGB 6.4*   HCT 20.2*      MCV 90   MCH 28.6   MCHC 31.7*        Significant Imaging:  Labs: Reviewed

## 2024-04-30 NOTE — PROGRESS NOTES
04/30/24 1730 04/30/24 1735 04/30/24 1827   Vital Signs   /87 (!) 90/0 114/88   MAP (mmHg) 96  --  97   BP Location Left arm Left arm Left arm   BP Method Automatic Doppler Automatic   Patient Position Lying Lying Lying     Pt hypertensive, remains asymptomatic. Denies HA. Treatment team notified, per Yonathan Garrido MD administered  hydralazine 100 mg rachel for 2200 now.

## 2024-04-30 NOTE — NURSING
Nurses Note -- 4 Eyes      4/30/2024   1:01 AM      Skin assessed during: Q Shift Change      [x] No Altered Skin Integrity Present    []Prevention Measures Documented      [] Yes- Altered Skin Integrity Present or Discovered   [] LDA Added if Not in Epic (Describe Wound)   [] New Altered Skin Integrity was Present on Admit and Documented in LDA   [] Wound Image Taken    Wound Care Consulted? No    Attending Nurse:  Arie Avalos RN/Staff Member:   KYAW Newell

## 2024-04-30 NOTE — H&P
See IR consult dated 4/30/24    Basilia Hastings PA-C  Interventional Radiology   Spectra: [phone]

## 2024-05-01 ENCOUNTER — ANTI-COAG VISIT (OUTPATIENT)
Dept: CARDIOLOGY | Facility: CLINIC | Age: 64
End: 2024-05-01
Payer: MEDICAID

## 2024-05-01 ENCOUNTER — PATIENT MESSAGE (OUTPATIENT)
Dept: ENDOCRINOLOGY | Facility: HOSPITAL | Age: 64
End: 2024-05-01
Payer: MEDICAID

## 2024-05-01 VITALS
BODY MASS INDEX: 23.45 KG/M2 | HEIGHT: 74 IN | HEART RATE: 88 BPM | TEMPERATURE: 98 F | SYSTOLIC BLOOD PRESSURE: 84 MMHG | RESPIRATION RATE: 16 BRPM | WEIGHT: 182.75 LBS | OXYGEN SATURATION: 96 %

## 2024-05-01 DIAGNOSIS — Z95.811 LVAD (LEFT VENTRICULAR ASSIST DEVICE) PRESENT: Primary | ICD-10-CM

## 2024-05-01 LAB
ALBUMIN SERPL BCP-MCNC: 2.5 G/DL (ref 3.5–5.2)
ALLENS TEST: ABNORMAL
ALP SERPL-CCNC: 137 U/L (ref 55–135)
ALT SERPL W/O P-5'-P-CCNC: 45 U/L (ref 10–44)
ANION GAP SERPL CALC-SCNC: 11 MMOL/L (ref 8–16)
APTT PPP: 38.5 SEC (ref 21–32)
AST SERPL-CCNC: 19 U/L (ref 10–40)
BASOPHILS # BLD AUTO: 0.02 K/UL (ref 0–0.2)
BASOPHILS NFR BLD: 0.3 % (ref 0–1.9)
BILIRUB DIRECT SERPL-MCNC: 0.1 MG/DL (ref 0.1–0.3)
BILIRUB SERPL-MCNC: 0.2 MG/DL (ref 0.1–1)
BNP SERPL-MCNC: 1115 PG/ML (ref 0–99)
BUN SERPL-MCNC: 42 MG/DL (ref 8–23)
CALCIUM SERPL-MCNC: 8.6 MG/DL (ref 8.7–10.5)
CHLORIDE SERPL-SCNC: 105 MMOL/L (ref 95–110)
CO2 SERPL-SCNC: 23 MMOL/L (ref 23–29)
CREAT SERPL-MCNC: 4.3 MG/DL (ref 0.5–1.4)
CRP SERPL-MCNC: 55.7 MG/L (ref 0–8.2)
DELSYS: ABNORMAL
DIFFERENTIAL METHOD BLD: ABNORMAL
EOSINOPHIL # BLD AUTO: 0.2 K/UL (ref 0–0.5)
EOSINOPHIL NFR BLD: 2.7 % (ref 0–8)
ERYTHROCYTE [DISTWIDTH] IN BLOOD BY AUTOMATED COUNT: 17.9 % (ref 11.5–14.5)
EST. GFR  (NO RACE VARIABLE): 14.8 ML/MIN/1.73 M^2
GLUCOSE SERPL-MCNC: 136 MG/DL (ref 70–110)
HCT VFR BLD AUTO: 20.2 % (ref 40–54)
HGB BLD-MCNC: 6.1 G/DL (ref 14–18)
IMM GRANULOCYTES # BLD AUTO: 0.03 K/UL (ref 0–0.04)
IMM GRANULOCYTES NFR BLD AUTO: 0.5 % (ref 0–0.5)
INR PPP: 1.9 (ref 0.8–1.2)
LDH SERPL L TO P-CCNC: 235 U/L (ref 110–260)
LYMPHOCYTES # BLD AUTO: 0.7 K/UL (ref 1–4.8)
LYMPHOCYTES NFR BLD: 11.8 % (ref 18–48)
MAGNESIUM SERPL-MCNC: 1.6 MG/DL (ref 1.6–2.6)
MCH RBC QN AUTO: 27.7 PG (ref 27–31)
MCHC RBC AUTO-ENTMCNC: 30.2 G/DL (ref 32–36)
MCV RBC AUTO: 92 FL (ref 82–98)
MODE: ABNORMAL
MONOCYTES # BLD AUTO: 0.7 K/UL (ref 0.3–1)
MONOCYTES NFR BLD: 11.3 % (ref 4–15)
NEUTROPHILS # BLD AUTO: 4.3 K/UL (ref 1.8–7.7)
NEUTROPHILS NFR BLD: 73.4 % (ref 38–73)
NRBC BLD-RTO: 0 /100 WBC
PHOSPHATE SERPL-MCNC: 4 MG/DL (ref 2.7–4.5)
PLATELET # BLD AUTO: 292 K/UL (ref 150–450)
PMV BLD AUTO: 10.6 FL (ref 9.2–12.9)
PO2 BLDA: 31 MMHG (ref 40–60)
POC SATURATED O2: 56 % (ref 95–100)
POCT GLUCOSE: 162 MG/DL (ref 70–110)
POCT GLUCOSE: 248 MG/DL (ref 70–110)
POTASSIUM SERPL-SCNC: 3.6 MMOL/L (ref 3.5–5.1)
PREALB SERPL-MCNC: 30 MG/DL (ref 20–43)
PROT SERPL-MCNC: 6.9 G/DL (ref 6–8.4)
PROTHROMBIN TIME: 20.2 SEC (ref 9–12.5)
RBC # BLD AUTO: 2.2 M/UL (ref 4.6–6.2)
SAMPLE: ABNORMAL
SITE: ABNORMAL
SODIUM SERPL-SCNC: 139 MMOL/L (ref 136–145)
WBC # BLD AUTO: 5.92 K/UL (ref 3.9–12.7)

## 2024-05-01 PROCEDURE — 83880 ASSAY OF NATRIURETIC PEPTIDE: CPT | Performed by: STUDENT IN AN ORGANIZED HEALTH CARE EDUCATION/TRAINING PROGRAM

## 2024-05-01 PROCEDURE — 80076 HEPATIC FUNCTION PANEL: CPT | Performed by: STUDENT IN AN ORGANIZED HEALTH CARE EDUCATION/TRAINING PROGRAM

## 2024-05-01 PROCEDURE — 25000003 PHARM REV CODE 250: Performed by: STUDENT IN AN ORGANIZED HEALTH CARE EDUCATION/TRAINING PROGRAM

## 2024-05-01 PROCEDURE — 99233 SBSQ HOSP IP/OBS HIGH 50: CPT | Mod: ,,, | Performed by: PHYSICIAN ASSISTANT

## 2024-05-01 PROCEDURE — 99900035 HC TECH TIME PER 15 MIN (STAT)

## 2024-05-01 PROCEDURE — 63600175 PHARM REV CODE 636 W HCPCS: Performed by: INTERNAL MEDICINE

## 2024-05-01 PROCEDURE — 93750 INTERROGATION VAD IN PERSON: CPT | Mod: ,,, | Performed by: INTERNAL MEDICINE

## 2024-05-01 PROCEDURE — 99232 SBSQ HOSP IP/OBS MODERATE 35: CPT | Mod: ,,, | Performed by: NURSE PRACTITIONER

## 2024-05-01 PROCEDURE — 85610 PROTHROMBIN TIME: CPT | Performed by: STUDENT IN AN ORGANIZED HEALTH CARE EDUCATION/TRAINING PROGRAM

## 2024-05-01 PROCEDURE — 84100 ASSAY OF PHOSPHORUS: CPT | Performed by: STUDENT IN AN ORGANIZED HEALTH CARE EDUCATION/TRAINING PROGRAM

## 2024-05-01 PROCEDURE — 25000003 PHARM REV CODE 250: Performed by: INTERNAL MEDICINE

## 2024-05-01 PROCEDURE — 83615 LACTATE (LD) (LDH) ENZYME: CPT | Performed by: STUDENT IN AN ORGANIZED HEALTH CARE EDUCATION/TRAINING PROGRAM

## 2024-05-01 PROCEDURE — 86140 C-REACTIVE PROTEIN: CPT | Performed by: STUDENT IN AN ORGANIZED HEALTH CARE EDUCATION/TRAINING PROGRAM

## 2024-05-01 PROCEDURE — 82803 BLOOD GASES ANY COMBINATION: CPT

## 2024-05-01 PROCEDURE — 80048 BASIC METABOLIC PNL TOTAL CA: CPT | Performed by: STUDENT IN AN ORGANIZED HEALTH CARE EDUCATION/TRAINING PROGRAM

## 2024-05-01 PROCEDURE — 84134 ASSAY OF PREALBUMIN: CPT | Performed by: STUDENT IN AN ORGANIZED HEALTH CARE EDUCATION/TRAINING PROGRAM

## 2024-05-01 PROCEDURE — 85025 COMPLETE CBC W/AUTO DIFF WBC: CPT | Performed by: STUDENT IN AN ORGANIZED HEALTH CARE EDUCATION/TRAINING PROGRAM

## 2024-05-01 PROCEDURE — 83735 ASSAY OF MAGNESIUM: CPT | Performed by: STUDENT IN AN ORGANIZED HEALTH CARE EDUCATION/TRAINING PROGRAM

## 2024-05-01 PROCEDURE — 27000248 HC VAD-ADDITIONAL DAY

## 2024-05-01 PROCEDURE — 85730 THROMBOPLASTIN TIME PARTIAL: CPT | Performed by: STUDENT IN AN ORGANIZED HEALTH CARE EDUCATION/TRAINING PROGRAM

## 2024-05-01 RX ADMIN — BUMETANIDE 4 MG: 1 TABLET ORAL at 08:05

## 2024-05-01 RX ADMIN — SODIUM BICARBONATE 325 MG: 325 TABLET ORAL at 03:05

## 2024-05-01 RX ADMIN — FAMOTIDINE 20 MG: 20 TABLET ORAL at 08:05

## 2024-05-01 RX ADMIN — SODIUM BICARBONATE 325 MG: 325 TABLET ORAL at 08:05

## 2024-05-01 RX ADMIN — CEFTRIAXONE 2 G: 2 INJECTION, POWDER, FOR SOLUTION INTRAMUSCULAR; INTRAVENOUS at 09:05

## 2024-05-01 RX ADMIN — HYDRALAZINE HYDROCHLORIDE 100 MG: 50 TABLET ORAL at 03:05

## 2024-05-01 RX ADMIN — VENLAFAXINE 37.5 MG: 37.5 TABLET ORAL at 08:05

## 2024-05-01 RX ADMIN — HYDRALAZINE HYDROCHLORIDE 100 MG: 50 TABLET ORAL at 05:05

## 2024-05-01 RX ADMIN — AMPICILLIN 2 G: 2 INJECTION, POWDER, FOR SOLUTION INTRAMUSCULAR; INTRAVENOUS at 08:05

## 2024-05-01 RX ADMIN — AMIODARONE HYDROCHLORIDE 400 MG: 200 TABLET ORAL at 08:05

## 2024-05-01 RX ADMIN — Medication 400 MG: at 08:05

## 2024-05-01 RX ADMIN — GABAPENTIN 100 MG: 100 CAPSULE ORAL at 08:05

## 2024-05-01 NOTE — SUBJECTIVE & OBJECTIVE
Interval History:  Patient reports no new complaints this morning. He had tunneled PICC placed yesterday without difficultly.  He is eager to go home today.  Plan for follow up with Nephrology, ID and VAD clinics.  His H/H is low on day of discharge 6.1/20.2 but he is asymptomatic.  Will arrange for Epo as an outpatient.  On day of discharge: CVP: 11, SVO2: 56, CO: 8.06, CI: 3.87, SVR: 545.  Home health has been arranged and plan is for 6 weeks of Rocephin and Ampicillin for Enterococcus Bacteremia.     Lines:  Right IJ: 4/21/24    Continuous Infusions:  Current Facility-Administered Medications   Medication Dose Route Frequency Last Rate Last Admin     Scheduled Meds:  Current Facility-Administered Medications   Medication Dose Route Frequency    amiodarone  400 mg Oral Daily    ampicillin IV (PEDS and ADULTS)  2 g Intravenous Q12H    bumetanide  4 mg Oral BID loop    cefTRIAXone (Rocephin) IV (PEDS and ADULTS)  2 g Intravenous Q12H    famotidine  20 mg Oral Daily    gabapentin  100 mg Oral BID    hydrALAZINE  100 mg Oral Q8H    magnesium oxide  400 mg Oral Daily    sodium bicarbonate  325 mg Oral TID    traZODone  25 mg Oral QHS    venlafaxine  37.5 mg Oral Daily    warfarin  2.5 mg Oral Once per day on Sunday Monday Wednesday Friday Saturday    warfarin  3.75 mg Oral Once per day on Tuesday Thursday     PRN Meds:  Current Facility-Administered Medications:     acetaminophen, 1,000 mg, Oral, Q8H PRN    dextrose 10%, 12.5 g, Intravenous, PRN    dextrose 10%, 25 g, Intravenous, PRN    glucagon (human recombinant), 1 mg, Intramuscular, PRN    glucose, 16 g, Oral, PRN    glucose, 24 g, Oral, PRN    insulin aspart U-100, 0-5 Units, Subcutaneous, QID (AC + HS) PRN    senna-docusate 8.6-50 mg, 2 tablet, Oral, Daily PRN    Review of patient's allergies indicates:  No Known Allergies  Objective:     Vital Signs (Most Recent):  Temp: 98.1 °F (36.7 °C) (05/01/24 0830)  Pulse: 85 (05/01/24 0830)  Resp: 16 (05/01/24 0830)  BP:  94/68 (05/01/24 0830)  SpO2: 97 % (05/01/24 0830) Vital Signs (24h Range):  Temp:  [97.3 °F (36.3 °C)-98.4 °F (36.9 °C)] 98.1 °F (36.7 °C)  Pulse:  [68-97] 85  Resp:  [12-18] 16  SpO2:  [95 %-100 %] 97 %  BP: ()/(0-88) 94/68     Patient Vitals for the past 72 hrs (Last 3 readings):   Weight   05/01/24 0404 82.9 kg (182 lb 12.2 oz)   04/30/24 0441 81.5 kg (179 lb 10.8 oz)   04/29/24 0459 80.6 kg (177 lb 11.1 oz)     Body mass index is 23.47 kg/m².      Intake/Output Summary (Last 24 hours) at 5/1/2024 1050  Last data filed at 5/1/2024 0551  Gross per 24 hour   Intake 997.59 ml   Output 1050 ml   Net -52.41 ml       Hemodynamic Parameters:  CVP:  [11 mmHg] 11 mmHg    Telemetry: SR with PVC's       Physical Exam  Constitutional:       Appearance: He is ill-appearing.   HENT:      Head: Normocephalic and atraumatic.   Eyes:      Conjunctiva/sclera: Conjunctivae normal.      Pupils: Pupils are equal, round, and reactive to light.   Neck:      Comments: Do not appreciate elevated JVP  Cardiovascular:      Rate and Rhythm: Normal rate and regular rhythm.      Comments: Smooth VAD hum  Pulmonary:      Effort: Pulmonary effort is normal.      Comments: Lungs essentially CTA bilaterally  Abdominal:      General: Bowel sounds are normal.      Palpations: Abdomen is soft.   Musculoskeletal:         General: No swelling. Normal range of motion.      Cervical back: Normal range of motion and neck supple.   Skin:     General: Skin is warm and dry.      Capillary Refill: Capillary refill takes 2 to 3 seconds.   Neurological:      General: No focal deficit present.      Mental Status: He is alert and oriented to person, place, and time.            Significant Labs:  CBC:  Recent Labs   Lab 04/29/24  0451 04/30/24 0459 05/01/24 0459   WBC 6.60 7.88 5.92   RBC 2.27* 2.24* 2.20*   HGB 6.3* 6.4* 6.1*   HCT 20.4* 20.2* 20.2*    312 292   MCV 90 90 92   MCH 27.8 28.6 27.7   MCHC 30.9* 31.7* 30.2*     BNP:  Recent Labs   Lab  04/26/24  0400 04/29/24  0451 05/01/24 0459   BNP 1,562* 858* 1,115*     CMP:  Recent Labs   Lab 04/26/24  0400 04/26/24  1423 04/29/24  0451 04/30/24 0459 05/01/24 0459      < > 110 138* 136*   CALCIUM 8.2*   < > 8.4* 8.4* 8.6*   ALBUMIN 2.4*  --  2.5*  --  2.5*   PROT 6.5  --  6.4  --  6.9      < > 138 138 139   K 3.9   < > 3.7 3.8 3.6   CO2 24   < > 25 24 23      < > 104 104 105   BUN 27*   < > 33* 38* 42*   CREATININE 3.2*   < > 4.1* 4.3* 4.3*   ALKPHOS 180*  --  146*  --  137*   *  --  63*  --  45*   AST 73*  --  22  --  19   BILITOT 0.2  --  0.2  --  0.2    < > = values in this interval not displayed.      Coagulation:   Recent Labs   Lab 04/29/24  0451 04/30/24 0459 05/01/24 0459   INR 1.9* 1.9* 1.9*   APTT 39.5* 39.6* 38.5*     LDH:  Recent Labs   Lab 04/29/24  0451 04/30/24 0459 05/01/24 0459    247 235     Microbiology:  Microbiology Results (last 7 days)       Procedure Component Value Units Date/Time    Blood culture [[id number]] Collected: 04/20/24 0234    Order Status: Completed Specimen: Blood Updated: 04/25/24 0612     Blood Culture, Routine No growth after 5 days.    Blood culture [[id number]] Collected: 04/20/24 0240    Order Status: Completed Specimen: Blood Updated: 04/25/24 0612     Blood Culture, Routine No growth after 5 days.            I have reviewed all pertinent labs within the past 24 hours.    Estimated Creatinine Clearance: 20.7 mL/min (A) (based on SCr of 4.3 mg/dL (H)).    Diagnostic Results:  I have reviewed and interpreted all pertinent imaging results/findings within the past 24 hours.

## 2024-05-01 NOTE — PROGRESS NOTES
05/01/2024  Monet Aguiar    Current provider:  Sandhya Price MD    Device interrogation:      5/1/2024     5:02 AM 4/30/2024    11:51 PM 4/30/2024     9:00 PM 4/30/2024     3:50 PM 4/30/2024     3:30 PM 4/30/2024    12:29 PM 4/30/2024     7:52 AM   TXP LVAD INTERROGATIONS   Type HeartMate3 HeartMate3 HeartMate3 HeartMate3 HeartMate3 HeartMate3 HeartMate3   Flow 3.8 4.1 3.9 3.9 4.2 4.2 4   Speed 5000 5000 5000 5000 5000 5100 5000   PI 4.5 3.8 4.9 4.8 3.6 3.8 4   Power ([place]) 3.4 3.4 3.3 3.4 3.5 3.4 3.5   LSL 4600 4600 4600  4600 4700 4600   Pulsatility Pulse Pulse Pulse  Pulse Pulse Pulse          Rounded on [facility] to ensure all mechanical assist device settings (IABP or VAD) were appropriate and all parameters were within limits.  I was able to ensure all back up equipment was present, the staff had no issues, and the Perfusion Department daily rounding was complete.      For implantable VADs: Interrogation of Ventricular assist device was performed with analysis of device parameters and review of device function. I have personally reviewed the interrogation findings and agree with findings as stated.     In emergency, the nursing units have been notified to contact the perfusion department either by:  Calling [phone] from 630am to 4pm Mon thru Fri, utilizing the On-Call Finder functionality of Epic and searching for Perfusion, or by contacting the hospital  from 4pm to 630am and on weekends and asking to speak with the perfusionist on call.    8:28 AM

## 2024-05-01 NOTE — PROGRESS NOTES
[facility] - Cardiology Stepdown  Heart Transplant  Progress Note    Patient Name: Radha Abbott  MRN: [id number]  Admission Date: 4/16/2024  Hospital Length of Stay: 15 days  Attending Physician: Sandhya Price MD  Primary Care Provider: Vasu Kong MD  Principal Problem:Pneumonia due to COVID-19 virus    Subjective:   Interval History:  Patient reports no new complaints this morning. He had tunneled PICC placed yesterday without difficultly.  He is eager to go home today.  Plan for follow up with Nephrology, ID and VAD clinics.  His H/H is low on day of discharge 6.1/20.2 but he is asymptomatic.  Will arrange for Epo as an outpatient.  On day of discharge: CVP: 11, SVO2: 56, CO: 8.06, CI: 3.87, SVR: 545.  Home health has been arranged and plan is for 6 weeks of Rocephin and Ampicillin for Enterococcus Bacteremia.     Lines:  Right IJ: 4/21/24    Continuous Infusions:  Current Facility-Administered Medications   Medication Dose Route Frequency Last Rate Last Admin     Scheduled Meds:  Current Facility-Administered Medications   Medication Dose Route Frequency    amiodarone  400 mg Oral Daily    ampicillin IV (PEDS and ADULTS)  2 g Intravenous Q12H    bumetanide  4 mg Oral BID loop    cefTRIAXone (Rocephin) IV (PEDS and ADULTS)  2 g Intravenous Q12H    famotidine  20 mg Oral Daily    gabapentin  100 mg Oral BID    hydrALAZINE  100 mg Oral Q8H    magnesium oxide  400 mg Oral Daily    sodium bicarbonate  325 mg Oral TID    traZODone  25 mg Oral QHS    venlafaxine  37.5 mg Oral Daily    warfarin  2.5 mg Oral Once per day on Sunday Monday Wednesday Friday Saturday    warfarin  3.75 mg Oral Once per day on Tuesday Thursday     PRN Meds:  Current Facility-Administered Medications:     acetaminophen, 1,000 mg, Oral, Q8H PRN    dextrose 10%, 12.5 g, Intravenous, PRN    dextrose 10%, 25 g, Intravenous, PRN    glucagon (human recombinant), 1 mg, Intramuscular, PRN    glucose, 16 g, Oral, PRN    glucose, 24 g, Oral,  PRN    insulin aspart U-100, 0-5 Units, Subcutaneous, QID (AC + HS) PRN    senna-docusate 8.6-50 mg, 2 tablet, Oral, Daily PRN    Review of patient's allergies indicates:  No Known Allergies  Objective:     Vital Signs (Most Recent):  Temp: 98.1 °F (36.7 °C) (05/01/24 0830)  Pulse: 85 (05/01/24 0830)  Resp: 16 (05/01/24 0830)  BP: 94/68 (05/01/24 0830)  SpO2: 97 % (05/01/24 0830) Vital Signs (24h Range):  Temp:  [97.3 °F (36.3 °C)-98.4 °F (36.9 °C)] 98.1 °F (36.7 °C)  Pulse:  [68-97] 85  Resp:  [12-18] 16  SpO2:  [95 %-100 %] 97 %  BP: ()/(0-88) 94/68     Patient Vitals for the past 72 hrs (Last 3 readings):   Weight   05/01/24 0404 82.9 kg (182 lb 12.2 oz)   04/30/24 0441 81.5 kg (179 lb 10.8 oz)   04/29/24 0459 80.6 kg (177 lb 11.1 oz)     Body mass index is 23.47 kg/m².      Intake/Output Summary (Last 24 hours) at 5/1/2024 1050  Last data filed at 5/1/2024 0551  Gross per 24 hour   Intake 997.59 ml   Output 1050 ml   Net -52.41 ml       Hemodynamic Parameters:  CVP:  [11 mmHg] 11 mmHg    Telemetry: SR with PVC's       Physical Exam  Constitutional:       Appearance: He is ill-appearing.   HENT:      Head: Normocephalic and atraumatic.   Eyes:      Conjunctiva/sclera: Conjunctivae normal.      Pupils: Pupils are equal, round, and reactive to light.   Neck:      Comments: Do not appreciate elevated JVP  Cardiovascular:      Rate and Rhythm: Normal rate and regular rhythm.      Comments: Smooth VAD hum  Pulmonary:      Effort: Pulmonary effort is normal.      Comments: Lungs essentially CTA bilaterally  Abdominal:      General: Bowel sounds are normal.      Palpations: Abdomen is soft.   Musculoskeletal:         General: No swelling. Normal range of motion.      Cervical back: Normal range of motion and neck supple.   Skin:     General: Skin is warm and dry.      Capillary Refill: Capillary refill takes 2 to 3 seconds.   Neurological:      General: No focal deficit present.      Mental Status: He is alert and  oriented to person, place, and time.            Significant Labs:  CBC:  Recent Labs   Lab 04/29/24  0451 04/30/24 0459 05/01/24 0459   WBC 6.60 7.88 5.92   RBC 2.27* 2.24* 2.20*   HGB 6.3* 6.4* 6.1*   HCT 20.4* 20.2* 20.2*    312 292   MCV 90 90 92   MCH 27.8 28.6 27.7   MCHC 30.9* 31.7* 30.2*     BNP:  Recent Labs   Lab 04/26/24  0400 04/29/24 0451 05/01/24 0459   BNP 1,562* 858* 1,115*     CMP:  Recent Labs   Lab 04/26/24 0400 04/26/24  1423 04/29/24 0451 04/30/24 0459 05/01/24 0459      < > 110 138* 136*   CALCIUM 8.2*   < > 8.4* 8.4* 8.6*   ALBUMIN 2.4*  --  2.5*  --  2.5*   PROT 6.5  --  6.4  --  6.9      < > 138 138 139   K 3.9   < > 3.7 3.8 3.6   CO2 24   < > 25 24 23      < > 104 104 105   BUN 27*   < > 33* 38* 42*   CREATININE 3.2*   < > 4.1* 4.3* 4.3*   ALKPHOS 180*  --  146*  --  137*   *  --  63*  --  45*   AST 73*  --  22  --  19   BILITOT 0.2  --  0.2  --  0.2    < > = values in this interval not displayed.      Coagulation:   Recent Labs   Lab 04/29/24 0451 04/30/24 0459 05/01/24 0459   INR 1.9* 1.9* 1.9*   APTT 39.5* 39.6* 38.5*     LDH:  Recent Labs   Lab 04/29/24 0451 04/30/24 0459 05/01/24 0459    247 235     Microbiology:  Microbiology Results (last 7 days)       Procedure Component Value Units Date/Time    Blood culture [[id number]] Collected: 04/20/24 0234    Order Status: Completed Specimen: Blood Updated: 04/25/24 0612     Blood Culture, Routine No growth after 5 days.    Blood culture [[id number]] Collected: 04/20/24 0240    Order Status: Completed Specimen: Blood Updated: 04/25/24 0612     Blood Culture, Routine No growth after 5 days.            I have reviewed all pertinent labs within the past 24 hours.    Estimated Creatinine Clearance: 20.7 mL/min (A) (based on SCr of 4.3 mg/dL (H)).    Diagnostic Results:  I have reviewed and interpreted all pertinent imaging results/findings within the past 24 hours.  Assessment and Plan:       Radha Abbott is a very pleasant 63 yo male with stage D HFrEF, ICMP with previous admission for ADHF/CS. He did undergo HM3 placement placed by Dr Washington on 12/1/23. Lengthy post op course complicated by vasoplegia(requiring Giaprezza), debility, malnutrition/impaired swallowing, and renal failure requiring HD(since weaned off). Once medically stable, he was transferred to Rehab for further PT/OT/ST. Of note, pt left with permacath still in place. He has done really well in rehab and now has continued to progress well at home. He is on a very high dose of Bumex 4 mg twice daily. VAD speed is at 5000 rpm. No VAD alarms noted on interrogation occasional PI events . Today he presents to the ED for infectious workup due to complaints of fevers of 101.5F associated with fatigue, generalized weakness, lower back pain and HA. He also reports a decline in appetite. Wife changes his driveline dressing every Saturday and has not noticed any purulance with dressing changes. Body aches/weakness started around 2am last night followed by fevers around 5pm. He has been taking tylenol which has helped brng down fevers but has not helped with HA. HA is 5/10 in intensity. He reports slight increase in body weight/ fluid retention yesterday for which he took a dose of Metolazone with a drop in weight by ~5lbs. Denies any SOB, LE swelling at this time.      2D Echo with CFD done on 3/26/2024  LVAD: There is a Heartmate III LVAD running at 5000 RPM. The aortic valve does not open. The ventricular septum is at midline. Inflow cannula well seated at the apex. Outflow graft not visualized.    Left Ventricle: The left ventricle is moderately dilated. Normal wall thickness. Global hypokinesis present. Septal motion is abnormal. There is severely reduced systolic function with a visually estimated ejection fraction of 5 - 10%. There is diastolic dysfunction but grade cannot be determined.    Right Ventricle: Mild right ventricular enlargement.  Wall thickness is normal. Right ventricle wall motion  is normal. Systolic function is normal. Pacemaker lead present in the ventricle.    Left Atrium: Left atrium is severely dilated.    Right Atrium: Right atrium is moderately dilated.    Mitral Valve: The mitral valve is repaired with an Noble stitch. There is mild regurgitation.    IVC/SVC: Normal venous pressure at 3 mmHg.    * Pneumonia due to COVID-19 virus  -4/16/24 he presented to the ED for infectious workup due to complaints of fevers of 101.5F associated with fatigue, generalized weakness, lower back pain and HA that started this morning. No evidence of drive line infection. No history of line infection.   - COVID +ve  - CT chest/ abdomen / pelvis 4/16 showed new RML opacity  - S/p remdesavir.   - continue tylenol for fevers    Bacteremia  -blood cxs from 4/16 +ve for E faecalis   -ID following  -E faecalis is a known cause of endocarditis and AICD lead infections.   -TTE was negative for vegetation.  However TTE does not rule out valvular or lead vegetation. Will treat this as endocarditis with 6 weeks of IV antibiotics followed by suppressive therapy   -Currently on Ampicillin and Ceftriaxone for 6wk course as per ID. Estimated end date of therapy 5/30/24      LVAD (left ventricular assist device) present  -HeartMate 3 Implanted  12/1/2023  as DT  -Cont Coumadin, dosing by PharmD.  Goal INR 2.0-3.0 . Subtherapeutic today.   -Antiplatelets Not on ASA due to low H/H on implant   -LDH is stable overall today. Will continue to monitor daily.  -Speed set at  5000     -Interrogation notable for no events  -ECHO 4/18/24 with speed at 5000 EF: 10-15%, LVEDD: 6.1cm, RV size normal function mod reduced   -RHC 1/22/24 with RA 13, PCWP 22, PA 50/25, CO 5.8, CI 3.0   -Not listed for OHTx, declined for OHTX due to comorbidities     Procedure: Device Interrogation Including analysis of device parameters  Current Settings: Ventricular Assist Device  Review of device  function is stable/unstable stable        4/29/2024     8:34 AM 4/29/2024     4:17 AM 4/29/2024     1:00 AM 4/28/2024     7:49 PM 4/28/2024     4:47 PM 4/28/2024    11:31 AM 4/28/2024     8:06 AM   TXP LVAD INTERROGATIONS   Type HeartMate3 HeartMate3 HeartMate3 HeartMate3 HeartMate3 HeartMate3 HeartMate3   Flow 3.8 4.1 3.9 3.8 3.6 3.9 4   Speed 5000 5000 5000 5000 5000 5000 5000   PI 4.8 4.1 3.6 5.3 6.2 3.9 3.9   Power ([place]) 3.4 3.4 3.4 3.5 3.4 3.4 3.5   LSL 4600 4600        Pulsatility Pulse Pulse Pulse Pulse Pulse Pulse Pulse         Hyponatremia  -POA  -Daily labs       IVÁN (acute kidney injury)  Most likely 2/2 to ATN on CKD IV.      - As per nephrology, will decide on 4/29 whether patient will need HD moving forwards and will require a tunneled line placed.   - patient starting to produce urine. Creatinine stable.   - Will continue to monitor.  - Nephrology consulted. Appreciate recs.         Acute on chronic combined systolic and diastolic heart failure  -ICM  -Last 2D Echo  4/18/24 : LVEF 10-15%, LVEDD  6.1 cm  -Euvolemic on examination today. Continue home bumex 4mg bid.   -GDMT with Hydralazine  -2g Na dietary restriction, 1500 mL fluid restriction, strict I/Os      Ventricular tachycardia  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    Type 2 diabetes mellitus without complication, without long-term current use of insulin  -SSI    Anemia  - possibly secondary to dilutional anemia in the setting of hypervolemia.   -Also some loss with two session of HD  -H/H is low but stable and patient is asymptomatic.   -Will arrange for Epo as an outpatient     Hypertension  Continue hydralazine    Headache  C/o of HA on admit with no improvement despite tylenol  - CTH done 4/16 with no acute processes        DENIZ Faye  Heart Transplant  [facility] - Cardiology Step[facility]

## 2024-05-01 NOTE — PROGRESS NOTES
[facility] - Cardiology Stepdown  Endocrinology  Progress Note    Admit Date: 4/16/2024     Reason for Consult: Management of T2DM, Hyperglycemia      Surgical Procedure and Date: n/a     Diabetes diagnosis year: 1998     Home Diabetes Medications:  Patient has not been taking any medications for diabetes since October 2023    How often checking glucose at home? Checking infrequently   BG readings on regimen: 120-150's  Hypoglycemia on the regimen?  No  Missed doses on regimen?  n/a     Diabetes Complications include:     Hyperglycemia     Complicating diabetes co morbidities:   CAD s/p CABG, HTN, HLD        HPI: Mr. Radha Abbott is a very pleasant 63 yo male with stage D HFrEF, ICMP with previous admission for ADHF/CS. He did undergo HM3 placement placed by Dr Washington on 12/1/23. Lengthy post op course complicated by vasoplegia(requiring Giaprezza), debility, malnutrition/impaired swallowing, and renal failure requiring HD(since weaned off). Once medically stable, he was transferred to Rehab for further PT/OT/ST. Of note, pt left with permacath still in place. He has done really well in rehab and now has continued to progress well at home. He is on a very high dose of Bumex 4 mg twice daily. VAD speed is at 5000 rpm. No VAD alarms noted on interrogation occasional PI events . Today he presents to the ED for infectious workup due to complaints of fevers of 101.5F associated with fatigue, generalized weakness, lower back pain and HA. He also reports a decline in appetite. Wife changes his driveline dressing every Saturday and has not noticed any purulance with dressing changes. Body aches/weakness started around 2am last night followed by fevers around 5pm. He has been taking tylenol which has helped brng down fevers but has not helped with HA. HA is 5/10 in intensity. He reports slight increase in body weight/ fluid retention yesterday for which he took a dose of Metolazone with a drop in weight by ~5lbs. Denies any  "SOB, LE swelling at this time. Endocrine consulted to manage hyperglycemia and type 2 diabetes.     Hemoglobin A1C   Date Value Ref Range Status   2024 7.4 (H) 4.0 - 5.6 % Final     Comment:     ADA Screening Guidelines:  5.7-6.4%  Consistent with prediabetes  >or=6.5%  Consistent with diabetes    High levels of fetal hemoglobin interfere with the HbA1C  assay. Heterozygous hemoglobin variants (HbS, HgC, etc)do  not significantly interfere with this assay.   However, presence of multiple variants may affect accuracy.     2024 5.1 4.0 - 5.6 % Final     Comment:     ADA Screening Guidelines:  5.7-6.4%  Consistent with prediabetes  >or=6.5%  Consistent with diabetes    High levels of fetal hemoglobin interfere with the HbA1C  assay. Heterozygous hemoglobin variants (HbS, HgC, etc)do  not significantly interfere with this assay.   However, presence of multiple variants may affect accuracy.     10/12/2023 7.6 (H) 4.0 - 5.6 % Final     Comment:     ADA Screening Guidelines:  5.7-6.4%  Consistent with prediabetes  >or=6.5%  Consistent with diabetes    High levels of fetal hemoglobin interfere with the HbA1C  assay. Heterozygous hemoglobin variants (HbS, HgC, etc)do  not significantly interfere with this assay.   However, presence of multiple variants may affect accuracy.           Interval HPI:   Overnight events: No acute events overnight. Patient in room 311/311 A. Blood glucose stable. BG at and above goal on current insulin regimen (SSI ). Steroid use- None.    Renal function- Abnormal - Creatinine 4.3   Vasopressors-  None       Endocrine will continue to follow and manage insulin orders inpatient. Of note, patient refusing insulin with notable excursions.         Diet Renal     Eatin%  Nausea: No  Hypoglycemia and intervention: No  Fever: No  TPN and/or TF: Yes      BP (!) 94/0 (BP Location: Left arm, Patient Position: Lying)   Pulse 88   Temp 98.4 °F (36.9 °C) (Oral)   Resp 17   Ht 6' 2" (1.88 " "m)   Wt 82.9 kg (182 lb 12.2 oz)   SpO2 98%   BMI 23.47 kg/m²     Labs Reviewed and Include    No results for input(s): "GLU", "CALCIUM", "ALBUMIN", "PROT", "NA", "K", "CO2", "CL", "BUN", "CREATININE", "ALKPHOS", "ALT", "AST", "BILITOT" in the last 24 hours.    Lab Results   Component Value Date    WBC 5.92 05/01/2024    HGB 6.1 (L) 05/01/2024    HCT 20.2 (L) 05/01/2024    MCV 92 05/01/2024     05/01/2024     No results for input(s): "TSH", "FREET4" in the last 168 hours.  Lab Results   Component Value Date    HGBA1C 7.4 (H) 04/17/2024       Nutritional status:   Body mass index is 23.47 kg/m².  Lab Results   Component Value Date    ALBUMIN 2.5 (L) 04/29/2024    ALBUMIN 2.4 (L) 04/26/2024    ALBUMIN 2.5 (L) 04/24/2024     Lab Results   Component Value Date    PREALBUMIN 30 04/29/2024    PREALBUMIN 30 04/26/2024    PREALBUMIN 29 04/24/2024       Estimated Creatinine Clearance: 20.7 mL/min (A) (based on SCr of 4.3 mg/dL (H)).    Accu-Checks  Recent Labs     04/28/24  0820 04/28/24  1309 04/28/24  1955 04/29/24  0925 04/29/24  1154 04/29/24  1558 04/29/24  1935 04/30/24  0802 04/30/24  1216 04/30/24  2212   POCTGLUCOSE 138* 184* 199* 96 109 291* 183* 120* 251* 239*       Current Medications and/or Treatments Impacting Glycemic Control  Immunotherapy:    Immunosuppressants       None          Steroids:   Hormones (From admission, onward)      None          Pressors:    Autonomic Drugs (From admission, onward)      None          Hyperglycemia/Diabetes Medications:   Antihyperglycemics (From admission, onward)      Start     Stop Route Frequency Ordered    04/17/24 0738  insulin aspart U-100 pen 0-5 Units         -- SubQ Before meals & nightly PRN 04/17/24 0638            ASSESSMENT and PLAN    Pulmonary  * Pneumonia due to COVID-19 virus  Infection may elevate BG readings  On IV antibiotics  Managed per primary team  Avoid hypoglycemia        Cardiac/Vascular  LVAD (left ventricular assist device) " present  Managed per primary team  Avoid hypoglycemia        Hypertension  Uncontrolled HTN can worsen insulin resistance.     Endocrine  Type 2 diabetes mellitus without complication, without long-term current use of insulin  Endocrinology consulted for BG management.   BG goal 140-180    - Novolog (Insulin Aspart) prn for BG excursions LDC SSI (150/50)  - BG checks AC/HS  - Hypoglycemia protocol in place    ** Please notify Endocrine for any change and/or advance in diet**  ** Please call Endocrine for any BG related issues **    Discharge Planning:   Patient states that most of the BG excursions were post-prandial. Discussed with the patient at length about a plan for him to check his BG over the course of one week and submit logs for review. Informed the patient that his A1C was elevated and he may require medication(s) for treatment.               Erasom Parrish, DNP, FNP  Endocrinology  [facility] - Card[facility] Step[facility]

## 2024-05-01 NOTE — DISCHARGE SUMMARY
[facility] - Cardiology Stepdown  Heart Transplant  Discharge Summary      Patient Name: Radha Abbott  MRN: [id number]  Admission Date: 4/16/2024  Hospital Length of Stay: 15 days  Discharge Date and Time: 05/01/2024 11:03 AM  Attending Physician: Sandhya Price MD   Discharging Provider: DENIZ Faye  Primary Care Provider: Vasu Kong MD     HPI: Mr. Radha Abbott is a very pleasant 63 yo male with stage D HFrEF, ICMP with previous admission for ADHF/CS. He did undergo HM3 placement placed by Dr Washington on 12/1/23. Lengthy post op course complicated by vasoplegia(requiring Giaprezza), debility, malnutrition/impaired swallowing, and renal failure requiring HD(since weaned off). Once medically stable, he was transferred to Rehab for further PT/OT/ST. Of note, pt left with permacath still in place. He has done really well in rehab and now has continued to progress well at home. He is on a very high dose of Bumex 4 mg twice daily. VAD speed is at 5000 rpm. No VAD alarms noted on interrogation occasional PI events . Today he presents to the ED for infectious workup due to complaints of fevers of 101.5F associated with fatigue, generalized weakness, lower back pain and HA. He also reports a decline in appetite. Wife changes his driveline dressing every Saturday and has not noticed any purulance with dressing changes. Body aches/weakness started around 2am last night followed by fevers around 5pm. He has been taking tylenol which has helped brng down fevers but has not helped with HA. HA is 5/10 in intensity. He reports slight increase in body weight/ fluid retention yesterday for which he took a dose of Metolazone with a drop in weight by ~5lbs. Denies any SOB, LE swelling at this time.      2D Echo with CFD done on 3/26/2024  LVAD: There is a Heartmate III LVAD running at 5000 RPM. The aortic valve does not open. The ventricular septum is at midline. Inflow cannula well seated at the apex. Outflow graft not  visualized.    Left Ventricle: The left ventricle is moderately dilated. Normal wall thickness. Global hypokinesis present. Septal motion is abnormal. There is severely reduced systolic function with a visually estimated ejection fraction of 5 - 10%. There is diastolic dysfunction but grade cannot be determined.    Right Ventricle: Mild right ventricular enlargement. Wall thickness is normal. Right ventricle wall motion  is normal. Systolic function is normal. Pacemaker lead present in the ventricle.    Left Atrium: Left atrium is severely dilated.    Right Atrium: Right atrium is moderately dilated.    Mitral Valve: The mitral valve is repaired with an Noble stitch. There is mild regurgitation.    IVC/SVC: Normal venous pressure at 3 mmHg.    * No surgery found *     Hospital Course: 65 yo male admitted with COVID and Enterococcus bacteremia.  Hospital course complicated by IVÁN    In terms of COVID: patient treated  with 3 days of Remdesivir and responded well    He was found to have positive blood cultures: Enterococcus Bacteremia without sepsis.  TTE done and negative for vegetation, but ID recommendations were to treat for 6 weeks for presumptive endocarditis. He had tunneled PICC placed and plan is for Rocephin and Ampicillin until estimated end date of 5/30/24    His creatine elevated and got as high as 8.  Likely due to ATN in the setting of infection.  Nephrology consulted and trialysis line placed.  He did undergo 2 rounds of dialysis.  Last session was 4/25.  Post dialysis session he produced good urine and creatinine stabilized around 4 which was the upper limit of his previous baseline.  Nephrology felt that tunneled dialysis line not indicated.  They will follow up with him as an outpatient in 2 weeks.  His H/H was low on day of discharge, but patient is asymptomatic so will try to arrange Epo injections as an outpatient    Patient discharged in stable condition to home with home health     Goals of  Care Treatment Preferences:  Code Status: Full Code    Health care agent: Pt's wife, Arlyn is NOK  Health care agent number: No value filed.                   Consults (From admission, onward)          Status Ordering Provider     Inpatient consult to Interventional Radiology  Once        Provider:  (Not yet assigned)    Completed PAIGE SUERO     Inpatient consult to Nephrology  Once        Provider:  (Not yet assigned)    Completed HANY LAWRENCE     Inpatient consult to Midline team  Once        Provider:  (Not yet assigned)    Completed CHING STANFORD     Inpatient consult to Infectious Diseases  Once        Provider:  (Not yet assigned)    Completed ADRI BEST            Significant Diagnostic Studies: See chart for full details  CT head 4/17  CT Chest/Abdomen/Pelvis 4/17  Echo: 4/18  CT head: 4/23    Pending Diagnostic Studies:       None          Final Active Diagnoses:    Diagnosis Date Noted POA    PRINCIPAL PROBLEM:  Pneumonia due to COVID-19 virus [U07.1, J12.82] 04/16/2024 Yes    Bacteremia [R78.81] 04/17/2024 Yes    LVAD (left ventricular assist device) present [Z95.811] 12/01/2023 Not Applicable    Hyponatremia [E87.1] 04/17/2024 Yes    IVÁN (acute kidney injury) [N17.9] 03/11/2024 Yes    Acute on chronic combined systolic and diastolic heart failure [I50.43] 10/07/2023 Yes    Ventricular tachycardia [I47.20] 10/07/2023 Yes    Type 2 diabetes mellitus without complication, without long-term current use of insulin [E11.9] 10/07/2023 Yes    Anemia [D64.9] 04/26/2024 Yes    Acute systolic heart failure [I50.21] 04/24/2024 Yes    Pneumonia of right lung due to infectious organism [J18.9] 04/19/2024 Yes    Hypertension [I10] 04/17/2024 Yes    Weakness [R53.1] 04/17/2024 Yes    Headache [R51.9] 04/16/2024 Yes    Stage 4 chronic kidney disease [N18.4] 02/15/2024 Yes      Problems Resolved During this Admission:      Discharged Condition: stable    Disposition: Home or Self Care    Follow  Up:    Patient Instructions:      Diet renal     Diet Cardiac     Notify your health care provider if you experience any of the following:  redness, tenderness, or signs of infection (pain, swelling, redness, odor or green/yellow discharge around incision site)     Notify your health care provider if you experience any of the following:  difficulty breathing or increased cough     Notify your health care provider if you experience any of the following:  persistent dizziness, light-headedness, or visual disturbances     Activity as tolerated     Medications:  Reconciled Home Medications:      Medication List        START taking these medications      dextrose 5 % in water (D5W) PgBk 100 mL with cefTRIAXone 2 gram SolR 2 g  Inject 2 g into the vein every 12 (twelve) hours.     pulse oximeter device  Commonly known as: pulse oximeter  by Apply Externally route 2 (two) times a day. Use twice daily at 8 AM and 3 PM and record the value in [employer] as directed.     sodium chloride 0.9 % PgBk 100 mL with ampicillin 2 gram SolR 2 g  Inject 2 g into the vein every 12 (twelve) hours.            CHANGE how you take these medications      hydrALAZINE 100 MG tablet  Commonly known as: APRESOLINE  Take 1 tablet (100 mg total) by mouth every 8 (eight) hours.  What changed:   medication strength  how much to take     warfarin 2.5 MG tablet  Commonly known as: COUMADIN  Take 3.75mg orally daily on Tuesday and Thursdays. Take 2.5mg orally daily on all other days.  What changed: See the new instructions.            CONTINUE taking these medications      acetaminophen 500 MG tablet  Commonly known as: TYLENOL  Take 2 tablets (1,000 mg total) by mouth every 8 (eight) hours as needed for Pain.     amiodarone 400 MG tablet  Commonly known as: PACERONE  Take 1 tablet (400 mg total) by mouth once daily.     bumetanide 2 MG tablet  Commonly known as: BUMEX  Take 2 tablets (4 mg total) by mouth 2 (two) times a day.     famotidine 20 MG  tablet  Commonly known as: PEPCID  Take 1 tablet (20 mg total) by mouth once daily.     fluticasone propionate 50 mcg/actuation nasal spray  Commonly known as: FLONASE  2 sprays (100 mcg total) by Each Nostril route once daily.     gabapentin 100 MG capsule  Commonly known as: NEURONTIN  Take 1 capsule (100 mg total) by mouth 2 (two) times daily.     magnesium oxide 400 mg (241.3 mg magnesium) tablet  Commonly known as: MAG-OX  Take 1 tablet (400 mg total) by mouth once daily.     metOLazone 5 MG tablet  Commonly known as: ZAROXOLYN  Take 1 tablet (5 mg total) by mouth once weekly as needed on Wednesdays only if >5lb weight gain in 1 week     omega-3 acid ethyl esters 1 gram capsule  Commonly known as: LOVAZA  Take 2 capsules (2 g total) by mouth 2 (two) times daily.     senna-docusate 8.6-50 mg 8.6-50 mg per tablet  Commonly known as: PERICOLACE  Take 2 tablets by mouth daily as needed for Constipation.     sodium bicarbonate 325 MG tablet  Take 1 tablet (325 mg total) by mouth 3 (three) times daily.     traZODone 50 MG tablet  Commonly known as: DESYREL  Take 0.5 tablets (25 mg total) by mouth every evening.     venlafaxine 37.5 MG Tab  Commonly known as: EFFEXOR  Take 1 tablet (37.5 mg total) by mouth once daily.     vitamin D 1000 units Tab  Commonly known as: VITAMIN D3  Take 1 tablet (1,000 Units total) by mouth once daily.              DENIZ Faye  Heart Transplant  [facility] - Cardiology Stepdown

## 2024-05-01 NOTE — HOSPITAL COURSE
65 yo male admitted with COVID and Enterococcus bacteremia.  Hospital course complicated by IVÁN    In terms of COVID: patient treated  with 3 days of Remdesivir and responded well    He was found to have positive blood cultures: Enterococcus Bacteremia.  TTE done and negative for vegetation, but ID recommendations were to treat for 6 weeks for presumptive endocarditis. He had tunneled PICC placed and plan is for Rocephin and Ampicillin until estimated end date of 5/30/24    His creatine elevated and got as high as 8.  Likely due to ATN in the setting of infection.  Nephrology consulted and trialysis line placed.  He did undergo 2 rounds of dialysis.  Last session was 4/25.  Post dialysis session he produced good urine and creatinine stabilized around 4 which was the upper limit of his previous baseline.  Nephrology felt that tunneled dialysis line not indicated.  They will follow up with him as an outpatient in 2 weeks.  His H/H was low on day of discharge, but patient is asymptomatic so will try to arrange Epo injections as an outpatient    Patient discharged in stable condition to home with home health

## 2024-05-01 NOTE — PROGRESS NOTES
Discharge Note:    Pt to be dc home today per team.  CRISTAL spoke with Bessie with [facility] to notify of dc plans.  Bessie ordered IV meds for pt as ordered by team.  (See home health orders dated 4/22/2024)  CRISTAL also spoke with Jacqui with [facility] to notify of dc plans. [facility] to admit pt tomorrow.    CRISTAL spoke with pt's wife to discuss dc plans. Pt's wife voiced understanding and agreement to dc plans.      No other needs indicated at this time.

## 2024-05-01 NOTE — PLAN OF CARE
Pt free of falls and injury. Pt c/o moderate pain at IJ removal site and PRN tylenol given and helped. Pt AAOx4. Fall precautions remain in place. LVAD hum present and smooth. VAD numbers and dopplers WDL. DLES dressing CDI. Lab drawn, CVP of 11 and SvO2 of 56 obtained. IV abx given. BG monitored but pt refused insulin. Plan of care reviewed with pt.       Problem: Adult Inpatient Plan of Care  Goal: Plan of Care Review  Outcome: Progressing  Goal: Patient-Specific Goal (Individualized)  Outcome: Progressing  Goal: Optimal Comfort and Wellbeing  Outcome: Progressing     Problem: Diabetes Comorbidity  Goal: Blood Glucose Level Within Targeted Range  Outcome: Progressing     Problem: Skin Injury Risk Increased  Goal: Skin Health and Integrity  Outcome: Progressing     Problem: Fall Injury Risk  Goal: Absence of Fall and Fall-Related Injury  Outcome: Progressing

## 2024-05-01 NOTE — PLAN OF CARE
.Nephrology Chart Review  sCr stable at 4.3, good UOP, needs follow up with Nephrology in 2 weeks    Intake/Output Summary (Last 24 hours) at 5/1/2024 1312  Last data filed at 5/1/2024 1224  Gross per 24 hour   Intake 895.59 ml   Output 1200 ml   Net -304.41 ml       Vitals:    05/01/24 0830 05/01/24 1156 05/01/24 1200 05/01/24 1223   BP: 94/68 101/76  (!) 92/0   BP Location: Left arm   Left arm   Patient Position: Lying   Lying   Pulse: 85 94 90    Resp: 16 20     Temp: 98.1 °F (36.7 °C) 98.1 °F (36.7 °C)     TempSrc: Axillary      SpO2: 97% 98%     Weight:       Height:           Recent Labs   Lab 04/29/24  0451 04/30/24  0459 05/01/24  0459    138 139   K 3.7 3.8 3.6    104 105   CO2 25 24 23   BUN 33* 38* 42*   CREATININE 4.1* 4.3* 4.3*   CALCIUM 8.4* 8.4* 8.6*   PHOS 3.5 3.7 4.0           No other related issues identified. Please call Nephrology as needed; We will continue to follow.        Kamilla Angela MD  Nephrology Fellow

## 2024-05-01 NOTE — NURSING
MD Kumar notified about pt's hgb of 6.1. Pt is not hypotensive or symptomatic. MD Kumar will discuss with day shift team. Will pass on the info to day shift RN.

## 2024-05-01 NOTE — ASSESSMENT & PLAN NOTE
He is on a very high dose of Bumex 4 mg twice daily.   Appears euvolemic on exam.  Will continue home doses.  Strict I/Os  
- possibly secondary to dilutional anemia in the setting of hypervolemia.   - Will give IV lasix and recheck cbc.   
- possibly secondary to dilutional anemia in the setting of hypervolemia.   -Also some loss with two session of HD  -H/H is low but stable and patient is asymptomatic.   -Will arrange for Epo as an outpatient   
- possibly secondary to dilutional anemia in the setting of hypervolemia.   -H/H is low but stable and patient is asymptomatic.   
-4/16/24 he presented to the ED for infectious workup due to complaints of fevers of 101.5F associated with fatigue, generalized weakness, lower back pain and HA that started this morning. No evidence of drive line infection. No history of line infection.   - COVID +ve  - CT chest/ abdomen / pelvis 4/16 showed new RML opacity  - S/p remdesavir.   - continue tylenol for fevers  
-4/16/24 he presented to the ED for infectious workup due to complaints of fevers of 101.5F associated with fatigue, generalized weakness, lower back pain and HA that started this morning. No evidence of drive line infection. No history of line infection.   - COVID +ve  - CT chest/ abdomen / pelvis 4/16 showed new RML opacity  - UA clean, blood cxs from 4/16 +ve for E faecalis  - Vanc and Zosyn started on admit  - Appreciate ID's help. 3 day course of Remdesivir started  -He is on Vanc anc Ampicillin  - TTE to R/O endocarditis negative,    - continue tylenol for fevers  
-4/16/24 he presented to the ED for infectious workup due to complaints of fevers of 101.5F associated with fatigue, generalized weakness, lower back pain and HA that started this morning. No evidence of drive line infection. No history of line infection.   - COVID +ve  - CT chest/ abdomen / pelvis 4/16 showed new RML opacity  - UA clean, blood cxs from 4/16 +ve for E faecalis  - Vanc and Zosyn started on admit.  Changed to Ampicillin and Ceftriaxone   - Appreciate ID's help. Plan for 3 day course of Remdesivir  - TTE to R/O endocarditis negative,    - continue tylenol for fevers  
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  
-HeartMate 3 Implanted  12/1/2023  as DT  -Cont Coumadin, dosing by PharmD.  Goal INR 2.0-3.0 . Subtherapeutic today.   -Antiplatelets Not on ASA due to low H/H on implant   -LDH is stable overall today. Will continue to monitor daily.  -Speed set at  5000     -Interrogation notable for no events  -ECHO 4/18/24 with speed at 5000 EF: 10-15%, LVEDD: 6.1cm, RV size normal function mod reduced   -RHC 1/22/24 with RA 13, PCWP 22, PA 50/25, CO 5.8, CI 3.0   -Not listed for OHTx, declined for OHTX due to comorbidities     Procedure: Device Interrogation Including analysis of device parameters  Current Settings: Ventricular Assist Device  Review of device function is stable/unstable stable        4/29/2024     8:34 AM 4/29/2024     4:17 AM 4/29/2024     1:00 AM 4/28/2024     7:49 PM 4/28/2024     4:47 PM 4/28/2024    11:31 AM 4/28/2024     8:06 AM   TXP LVAD INTERROGATIONS   Type HeartMate3 HeartMate3 HeartMate3 HeartMate3 HeartMate3 HeartMate3 HeartMate3   Flow 3.8 4.1 3.9 3.8 3.6 3.9 4   Speed 5000 5000 5000 5000 5000 5000 5000   PI 4.8 4.1 3.6 5.3 6.2 3.9 3.9   Power ([place]) 3.4 3.4 3.4 3.5 3.4 3.4 3.5   LSL 4600 4600        Pulsatility Pulse Pulse Pulse Pulse Pulse Pulse Pulse       
-HeartMate 3 Implanted  12/1/2023  as DT  -Cont Coumadin, dosing by PharmD.  Goal INR 2.0-3.0 . Supratherapeutic today but improving after PO vit K 2.5mg yesterday.   -Antiplatelets Not on ASA due to low H/H on implant   -LDH is stable overall today. Will continue to monitor daily.  -Speed set at  5000     -Interrogation notable for no events  -ECHO 4/18/24 with speed at 5000 EF: 10-15%, LVEDD: 6.1cm, RV size normal function mod reduced   -RHC 1/22/24 with RA 13, PCWP 22, PA 50/25, CO 5.8, CI 3.0   -Not listed for OHTx, declined for OHTX due to comorbidities     Procedure: Device Interrogation Including analysis of device parameters  Current Settings: Ventricular Assist Device  Review of device function is stable/unstable stable        4/24/2024     8:23 AM 4/24/2024     3:53 AM 4/24/2024    12:03 AM 4/23/2024     8:02 PM 4/23/2024     3:40 PM 4/23/2024    11:31 AM 4/23/2024     7:40 AM   TXP LVAD INTERROGATIONS   Type HeartMate3 HeartMate3 HeartMate3 HeartMate3 HeartMate3 HeartMate3 HeartMate3   Flow 4.1 3.6 3.8 3.5 4 3.9 3.7   Speed 5000 5100 5000 5000 5000 5000 5000   PI 4.6 6.3 6.6 7.9 4.9 4.5 6.2   Power ([place]) 3.4 3.4 3.4 3.5 3.5 3.4 3.5   LSL 4800 4800 4600 4600 4600 4600 4600   Pulsatility Intermittent pulse  Intermittent pulse No Pulse Intermittent pulse Intermittent pulse Intermittent pulse       
-HeartMate 3 Implanted  12/1/2023  as DT  -Cont Coumadin, dosing by PharmD.  Goal INR 2.0-3.0 . Supratherapeutic today but improving after PO vit K 2.5mg yesterday.   -Antiplatelets Not on ASA due to low H/H on implant   -LDH is stable overall today. Will continue to monitor daily.  -Speed set at  5000     -Interrogation notable for no events  -ECHO 4/18/24 with speed at 5000 EF: 10-15%, LVEDD: 6.1cm, RV size normal function mod reduced   -RHC 1/22/24 with RA 13, PCWP 22, PA 50/25, CO 5.8, CI 3.0   -Not listed for OHTx, declined for OHTX due to comorbidities     Procedure: Device Interrogation Including analysis of device parameters  Current Settings: Ventricular Assist Device  Review of device function is stable/unstable stable        4/25/2024     7:45 AM 4/25/2024     4:34 AM 4/25/2024    12:45 AM 4/24/2024    10:05 PM 4/24/2024     7:56 PM 4/24/2024     5:31 PM 4/24/2024    12:06 PM   TXP LVAD INTERROGATIONS   Type HeartMate3 HeartMate3 HeartMate3 HeartMate3 HeartMate3 HeartMate3 HeartMate3   Flow 3.8 4 3.9 3.8 3.9 3.8 3.8   Speed 5000 5000 5000 5000 5000 5000 5000   PI 4.2 4.2 4.7 5.2 5 6.1 6.1   Power ([place]) 3.5 3.4 3.4 3.4 3.5 3.5 3.5   LSL 4800 4800 4800 4800 4800 4800 4800   Pulsatility No Pulse  Pulse Pulse Intermittent pulse Intermittent pulse Intermittent pulse       
-HeartMate 3 Implanted  12/1/2023  as DT  -Cont Coumadin, dosing by PharmD.  Goal INR 2.0-3.0 . Supratherapeutic today.  Will hold coumadin   -Antiplatelets Not on ASA due to low H/H on implant   -LDH is stable overall today. Will continue to monitor daily.  -Speed set at  5000     -Interrogation notable for no events  -ECHO 4/18/24 with speed at 5000 EF: 10-15%, LVEDD: 6.1cm, RV size normal function mod reduced   -RHC 1/22/24 with RA 13, PCWP 22, PA 50/25, CO 5.8, CI 3.0   -Not listed for OHTx, declined for OHTX due to comorbidities     Procedure: Device Interrogation Including analysis of device parameters  Current Settings: Ventricular Assist Device  Review of device function is stable/unstable stable        4/21/2024     4:30 AM 4/21/2024    12:00 AM 4/20/2024     7:36 PM 4/20/2024     4:46 PM 4/20/2024    12:04 PM 4/20/2024     8:26 AM 4/20/2024     4:54 AM   TXP LVAD INTERROGATIONS   Type HeartMate3 HeartMate3 HeartMate3 HeartMate3 HeartMate3 HeartMate3 HeartMate3   Flow 3.9 3.9 3.4 3.9 3.8 3.8 4   Speed 5050 5000 5000 5000 5000 5000 5000   PI 5.7 5.3 7.5 4.7 6.5 5.7 4.8   Power ([place]) 3.5 3.5 3.5 3.4 3.5 3.5 3.4   LSL 4600 4600 4600 4600 4600 4600    Pulsatility Intermittent pulse   Intermittent pulse Intermittent pulse Intermittent pulse Intermittent pulse       
-HeartMate 3 Implanted  12/1/2023  as DT  -Cont Coumadin, dosing by PharmD.  Goal INR 2.0-3.0 . Supratherapeutic today.  Will hold coumadin   -Antiplatelets Not on ASA due to low H/H on implant   -LDH is stable overall today. Will continue to monitor daily.  -Speed set at  5000     -Interrogation notable for no events  -ECHO 4/18/24 with speed at 5000 EF: 10-15%, LVEDD: 6.1cm, RV size normal function mod reduced   -RHC 1/22/24 with RA 13, PCWP 22, PA 50/25, CO 5.8, CI 3.0   -Not listed for OHTx, declined for OHTX due to comorbidities     Procedure: Device Interrogation Including analysis of device parameters  Current Settings: Ventricular Assist Device  Review of device function is stable/unstable stable        4/22/2024     8:00 AM 4/22/2024     3:56 AM 4/21/2024    11:44 PM 4/21/2024     7:51 PM 4/21/2024     4:06 PM 4/21/2024    12:01 PM 4/21/2024     7:55 AM   TXP LVAD INTERROGATIONS   Type HeartMate3 HeartMate3 HeartMate3 HeartMate3 HeartMate3 HeartMate3 HeartMate3   Flow 3.7 3.6 3.7 3.4 3.9 3.9 3.7   Speed 5000 5000 5000 5050 5050 5000 5050   PI 5.9 5.9 6.2 8.2 5.4 4.6 6.5   Power ([place]) 3.5 3.4 3,4 3.5 3.4 3.4 3.5   LSL 4600 4600 4600 4600      Pulsatility Intermittent pulse Intermittent pulse Intermittent pulse Intermittent pulse Intermittent pulse Intermittent pulse Intermittent pulse       
-HeartMate 3 Implanted  12/1/2023  as DT  -Cont Coumadin, dosing by PharmD.  Goal INR 2.0-3.0 . Supratherapeutic today.  Will hold coumadin. Will give 1 dose of PO vitamin K.   -Antiplatelets Not on ASA due to low H/H on implant   -LDH is stable overall today. Will continue to monitor daily.  -Speed set at  5000     -Interrogation notable for no events  -ECHO 4/18/24 with speed at 5000 EF: 10-15%, LVEDD: 6.1cm, RV size normal function mod reduced   -RHC 1/22/24 with RA 13, PCWP 22, PA 50/25, CO 5.8, CI 3.0   -Not listed for OHTx, declined for OHTX due to comorbidities     Procedure: Device Interrogation Including analysis of device parameters  Current Settings: Ventricular Assist Device  Review of device function is stable/unstable stable        4/23/2024     7:40 AM 4/23/2024     7:34 AM 4/23/2024     4:00 AM 4/22/2024    11:25 PM 4/22/2024     8:00 PM 4/22/2024     3:41 PM 4/22/2024    12:00 PM   TXP LVAD INTERROGATIONS   Type HeartMate3 HeartMate3 HeartMate3 HeartMate3 HeartMate3 HeartMate II HeartMate3   Flow 3.7  3.9 3.6 3.8 3.9 3.9   Speed 5000  5000 5000 5000 5000 5000   PI 6.2  5.3 6 6.3 5.4 5.7   Power ([place]) 3.5  3.5 3.5 3.4 3.4 3.5   LSL 4600    4600 4600 4600   Pulsatility Intermittent pulse  Intermittent pulse Intermittent pulse Intermittent pulse  Intermittent pulse       
-HeartMate 3 Implanted  12/1/2023  as DT  -Cont Coumadin, dosing by PharmD.  Goal INR 2.0-3.0 . Therapeutic today  -Antiplatelets Not on ASA due to low H/H on implant   -LDH is stable overall today. Will continue to monitor daily.  -Speed set at  5000     -Interrogation notable for no events  -ECHO 4/18/24 with speed at 5000 EF: 10-15%, LVEDD: 6.1cm, RV size normal function mod reduced   -RHC 1/22/24 with RA 13, PCWP 22, PA 50/25, CO 5.8, CI 3.0   -Not listed for OHTx, declined for OHTX due to comorbidities     Procedure: Device Interrogation Including analysis of device parameters  Current Settings: Ventricular Assist Device  Review of device function is stable/unstable stable        4/19/2024    12:10 PM 4/19/2024     8:05 AM 4/19/2024     5:00 AM 4/18/2024    10:49 PM 4/18/2024     7:56 PM 4/18/2024     4:55 PM 4/18/2024    12:42 PM   TXP LVAD INTERROGATIONS   Type HeartMate3 HeartMate3   HeartMate3 HeartMate3 HeartMate3   Flow 3.9 4.1 3.7 4.1 4.2 3.9 3.9   Speed 5000 5000 500 5000 5000 5000 5000   PI 5.2 4.9 5.7 5.2 3.2 5.6 5.8   Power ([place]) 3.5 3.4 3.5 3.4 3.4 3.3 3.5   LSL  4600   4600 4600    Pulsatility Intermittent pulse Intermittent pulse    Intermittent pulse Intermittent pulse       
-HeartMate 3 Implanted  12/1/2023  as DT  -Cont Coumadin, dosing by PharmD.  Goal INR 2.0-3.0 . Therapeutic today  -Antiplatelets Not on ASA due to low H/H on implant   -LDH is stable overall today. Will continue to monitor daily.  -Speed set at  5000     -Interrogation notable for no events  -ECHO 4/18/24 with speed at 5000 EF: 10-15%, LVEDD: 6.1cm, RV size normal function mod reduced   -RHC 1/22/24 with RA 13, PCWP 22, PA 50/25, CO 5.8, CI 3.0   -Not listed for OHTx, declined for OHTX due to comorbidities     Procedure: Device Interrogation Including analysis of device parameters  Current Settings: Ventricular Assist Device  Review of device function is stable/unstable stable        4/20/2024     8:26 AM 4/20/2024     4:54 AM 4/19/2024    11:33 PM 4/19/2024     8:10 PM 4/19/2024     5:34 PM 4/19/2024    12:10 PM 4/19/2024     8:05 AM   TXP LVAD INTERROGATIONS   Type HeartMate3 HeartMate3 HeartMate3 HeartMate3 HeartMate3 HeartMate3 HeartMate3   Flow 3.8 4 4.1 3.8 3.8 3.9 4.1   Speed 5000 5000 500 5000 5000 5000 5000   PI 5.7 4.8 3.9 5.3 6 5.2 4.9   Power ([place]) 3.5 3.4 3.5 3.5 3.5 3.5 3.4   LSL 4600   4600   4600   Pulsatility Intermittent pulse Intermittent pulse Intermittent pulse Pulse Intermittent pulse Intermittent pulse Intermittent pulse       
-HeartMate 3 Implanted  12/1/2023  as DT  -Cont Coumadin, dosing by PharmD.  Goal INR 2.0-3.0 . Therapeutic today.   -Antiplatelets Not on ASA due to low H/H on implant   -LDH is stable overall today. Will continue to monitor daily.  -Speed set at  5000     -Interrogation notable for no events  -ECHO 4/18/24 with speed at 5000 EF: 10-15%, LVEDD: 6.1cm, RV size normal function mod reduced   -RHC 1/22/24 with RA 13, PCWP 22, PA 50/25, CO 5.8, CI 3.0   -Not listed for OHTx, declined for OHTX due to comorbidities     Procedure: Device Interrogation Including analysis of device parameters  Current Settings: Ventricular Assist Device  Review of device function is stable/unstable stable        4/27/2024     8:48 AM 4/27/2024     5:05 AM 4/26/2024    11:27 PM 4/26/2024     8:39 PM 4/26/2024     4:00 PM 4/26/2024    12:00 PM 4/26/2024     8:00 AM   TXP LVAD INTERROGATIONS   Type HeartMate3 HeartMate3 HeartMate3 HeartMate3 HeartMate3 HeartMate3 HeartMate3   Flow 3.9 3.6 3.6 3.9 3.8 3.8 3.7   Speed 5000 5000 5000 5000 5000 5000 5000   PI 4.4 5.8 6.1 5.5 5.7 5.4 5.4   Power ([place]) 3.4 3.3 3.4 3.5 3.5 3.5 3.4   LSL 4600 4600 4600  4600 4600 4600   Pulsatility Pulse Pulse Pulse Pulse Pulse Pulse Pulse       
-HeartMate 3 Implanted  12/1/2023  as DT  -Cont Coumadin, dosing by PharmD.  Goal INR 2.0-3.0 . Therapeutic today.   -Antiplatelets Not on ASA due to low H/H on implant   -LDH is stable overall today. Will continue to monitor daily.  -Speed set at  5000     -Interrogation notable for no events  -ECHO 4/18/24 with speed at 5000 EF: 10-15%, LVEDD: 6.1cm, RV size normal function mod reduced   -RHC 1/22/24 with RA 13, PCWP 22, PA 50/25, CO 5.8, CI 3.0   -Not listed for OHTx, declined for OHTX due to comorbidities     Procedure: Device Interrogation Including analysis of device parameters  Current Settings: Ventricular Assist Device  Review of device function is stable/unstable stable        4/28/2024     8:06 AM 4/28/2024     1:00 AM 4/27/2024     7:58 PM 4/27/2024     4:00 PM 4/27/2024    12:00 PM 4/27/2024     8:48 AM 4/27/2024     5:05 AM   TXP LVAD INTERROGATIONS   Type HeartMate3 HeartMate3 HeartMate3 HeartMate3 HeartMate3 HeartMate3 HeartMate3   Flow 4 3.6 3.8 4 4 3.9 3.6   Speed 5000 5000 5050 5000 5000 5000 5000   PI 3.9 6.4 5.8 4.3 4.2 4.4 5.8   Power ([place]) 3.5 3.3 3.3 3.5 3.4 3.4 3.3   LSL   4600 4600 4600 4600 4600   Pulsatility Pulse Pulse Pulse Pulse Pulse Pulse Pulse       
-HeartMate 3 Implanted  12/1/2023  as DT  -Cont Coumadin, dosing by PharmD.  Goal INR 2.0-3.0 . Therapeutic today. Will restart coumadin.   -Antiplatelets Not on ASA due to low H/H on implant   -LDH is stable overall today. Will continue to monitor daily.  -Speed set at  5000     -Interrogation notable for no events  -ECHO 4/18/24 with speed at 5000 EF: 10-15%, LVEDD: 6.1cm, RV size normal function mod reduced   -RHC 1/22/24 with RA 13, PCWP 22, PA 50/25, CO 5.8, CI 3.0   -Not listed for OHTx, declined for OHTX due to comorbidities     Procedure: Device Interrogation Including analysis of device parameters  Current Settings: Ventricular Assist Device  Review of device function is stable/unstable stable        4/26/2024     8:00 AM 4/26/2024     4:02 AM 4/26/2024    12:19 AM 4/25/2024     8:23 PM 4/25/2024     8:22 PM 4/25/2024     3:46 PM 4/25/2024    11:09 AM   TXP LVAD INTERROGATIONS   Type HeartMate3 HeartMate3 HeartMate3 HeartMate3 HeartMate3 HeartMate3 HeartMate3   Flow 3.7 4.1 4.1 4  4.2 4   Speed 5000 5050 5000 5000  5000 5000   PI 5.4 3.8 3.6 4  3.7 3.6   Power ([place]) 3.4 3.3 3.4 3.4  3.6 3.4   LSL 4600 4800 4800 4800  4800 4800   Pulsatility Pulse Intermittent pulse Intermittent pulse Intermittent pulse  Intermittent pulse Pulse       
-ICM  -Last 2D Echo  4/18/24 : LVEF 10-15%, LVEDD  6.1 cm  -Euvolemic on examination today  -Was on Bumex 4mg BID at home.  Holding due to IVÁN   -GDMT with Hydralazine  -2g Na dietary restriction, 1500 mL fluid restriction, strict I/Os    
-ICM  -Last 2D Echo  4/18/24 : LVEF 10-15%, LVEDD  6.1 cm  -Euvolemic on examination today  -Was on Bumex 4mg BID at home.  Holding due to IVÁN   -GDMT with Hydralazine  -2g Na dietary restriction, 1500 mL fluid restriction, strict I/Os    
-ICM  -Last 2D Echo  4/18/24 : LVEF 10-15%, LVEDD  6.1 cm  -Euvolemic on examination today  -Was on Bumex 4mg BID at home.  Will hold today given creatinine is up at 6.0  -GDMT with Hydralazine  -2g Na dietary restriction, 1500 mL fluid restriction, strict I/Os    
-ICM  -Last 2D Echo  4/18/24 : LVEF 10-15%, LVEDD  6.1 cm  -Euvolemic on examination today  -Was on Bumex 4mg BID at home.  Will hold today given creatinine is up at 6.8  -GDMT with Hydralazine  -2g Na dietary restriction, 1500 mL fluid restriction, strict I/Os    
-ICM  -Last 2D Echo  4/18/24 : LVEF 10-15%, LVEDD  6.1 cm  -Euvolemic on examination today. Continue home bumex 4mg bid.   -GDMT with Hydralazine  -2g Na dietary restriction, 1500 mL fluid restriction, strict I/Os    
-ICM  -Last 2D Echo  4/18/24 : LVEF 10-15%, LVEDD  6.1 cm  -Euvolemic on examination today. Will restart home bumex 4mg bid.   -GDMT with Hydralazine  -2g Na dietary restriction, 1500 mL fluid restriction, strict I/Os    
-ICM  -Last 2D Echo  4/18/24 : LVEF 10-15%, LVEDD  6.1 cm  -Hypervolemic on examination today. Will give dose of IV lasix 80mg and monitor response.   -GDMT with Hydralazine  -2g Na dietary restriction, 1500 mL fluid restriction, strict I/Os    
-POA  -Daily labs     
-SSI  
-SSI  
-blood cxs from 4/16 +ve for E faecalis   -ID following  -E faecalis is a known cause of endocarditis and AICD lead infections.   -TTE was negative for vegetation.  However TTE does not rule out valvular or lead vegetation. Will treat this as endocarditis with 6 weeks of IV antibiotics followed by suppressive therapy   -Currently on Ampicillin  
-blood cxs from 4/16 +ve for E faecalis   -ID following  -E faecalis is a known cause of endocarditis and AICD lead infections.   -TTE was negative for vegetation.  However TTE does not rule out valvular or lead vegetation. Will treat this as endocarditis with 6 weeks of IV antibiotics followed by suppressive therapy   -Currently on Ampicillin and Ceftriaxone   
-blood cxs from 4/16 +ve for E faecalis   -ID following  -E faecalis is a known cause of endocarditis and AICD lead infections.   -TTE was negative for vegetation.  However TTE does not rule out valvular or lead vegetation. Will treat this as endocarditis with 6 weeks of IV antibiotics followed by suppressive therapy   -Currently on Ampicillin and Ceftriaxone   -Repeat cultures from 4/20 are negative thus far  
-blood cxs from 4/16 +ve for E faecalis   -ID following  -E faecalis is a known cause of endocarditis and AICD lead infections.   -TTE was negative for vegetation.  However TTE does not rule out valvular or lead vegetation. Will treat this as endocarditis with 6 weeks of IV antibiotics followed by suppressive therapy   -Currently on Ampicillin and Ceftriaxone for 6wk course as per ID.   -Repeat cultures from 4/20 are negative thus far  
-blood cxs from 4/16 +ve for E faecalis   -ID following  -E faecalis is a known cause of endocarditis and AICD lead infections.   -TTE was negative for vegetation.  However TTE does not rule out valvular or lead vegetation. Will treat this as endocarditis with 6 weeks of IV antibiotics followed by suppressive therapy   -Currently on Ampicillin and Ceftriaxone for 6wk course as per ID. Estimated end date of therapy 5/30/24    
-on CKD IV  -sCr has bumped from 4.3 to 8.3 (baseline 3's - 4's).   -Held Bumex and gave IV fluids with no improvement  -Likely ATN  -Will place trialysis line  -Nephrology consult   
-sCr has bumped from 4.3 to 5.0 (baseline 3's - 4's). Vanc level 23.1 so holding today's dose  
-sCr has bumped from 4.3 to 6.0 (baseline 3's - 4's).   -Holding Bumex for now and will monitor response  
-sCr has bumped from 4.3 to 6.8 (baseline 3's - 4's).   -Holding Bumex for now and will monitor response  -Will give IV fluids today and see if it improves.  If no improvement tomorrow then will get Nephrology consult   
64 year old male with a history of LVAD placement in December 2023 and AICD in place..  Patient is admitted with E faecalis bacteremia.  Source is unclear.  Per patient's wife, his drive line exit site looked clean.   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      Plan  Continue IV vancomycin.  Check echocardiogram.  
64 year old male with a history of LVAD placement in December 2023 and AICD in place..  Patient is admitted with E faecalis bacteremia.  Source is unclear.  Per patient's wife, his drive line exit site looked clean.   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  TTE was negative for vegetation.  However TTE does not rule out valvular or lead vegetation.    Plan  Continue IV vancomycin.  IV ampicillin started as the E faecalis isolate is likely sensitive to this.  Follow up sensitivities.  Will treat this as endocarditis with 6 weeks of IV antibiotics followed by suppressive therapy.  
64 year old male with a history of LVAD placement in December 2023 and AICD in place..  Patient is admitted with E faecalis bacteremia.  Source is unclear.  Per patient's wife, his drive line exit site looked clean.   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  TTE was negative for vegetation.  However TTE does not rule out valvular or lead vegetation.  Will plan to treat for presumptive endocarditis for 6 weeks with ceftriaxone and ampicillin.    Plan  Ampicillin 2 grams IV q 12 hours (renally dosed).  Ceftriaxone 2 grams IV q 12 hours.  Follow up results of blood cultures.  
64 year old male with a history of LVAD placement in December 2023 and AICD in place..  Patient is admitted with E faecalis bacteremia.  Source is unclear.  Per patient's wife, his drive line exit site looked clean.   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  TTE was negative for vegetation.  However TTE does not rule out valvular or lead vegetation.  Will plan to treat for presumptive endocarditis for 6 weeks with ceftriaxone and ampicillin.    Plan  Ampicillin 2 grams IV q 12 hours (renally dosed).  Ceftriaxone 2 grams IV q 12 hours.  Repeat blood cultures.  
Anuric IVÁN on baseline CKD IV  Baseline Scr ~4; Scr at time of consult ~8.3  UA Trace protein; bland otherwise.   UPCR 0.61g  Renal US: not avaialbe. In 2023 showed MRD.  Suspect ATN given rapid increase in Scr and anuria, now making urine.  Patient with prior CKD V poor reserve. Chart reviewed in depth, no inciting cause of IVÁN (normal vanc troughs, no obvious nephrotoxins, normal CPK). Suspect insult occurred in setting of COVID/bacteremia.     Plan;  -No need of TDC placement at this time, good UOP, sCR is stable at 3.8-4.2. Will need a close follow with Nephrologist as outpatient (2 weeks).  - Keep on Bumax 4mg BID  -HgB optimization, will need Procrit  units/kg/week  -Renal diet, low Na < 2g/day, fluids 35 ounces/day  - renally dose all medications  - avoid nephrotoxins as much as possible  
Anuric IVÁN on baseline CKD IV  Baseline Scr ~4; Scr at time of consult ~8.3  UA Trace protein; bland otherwise.   UPCR 0.61g  Renal US: not avaialbe. In 2023 showed MRD.  Suspect ATN given rapid increase in Scr and anuria, now making urine.  Patient with prior CKD V poor reserve. Chart reviewed in depth, no inciting cause of IVÁN (normal vanc troughs, no obvious nephrotoxins, normal CPK). Suspect insult occurred in setting of COVID/bacteremia.     Plan;  -Will monitor Renal functions till tomorrow, if sCr remains stable and UOP ~ 800ml, will defer TDC placement, otherwise will needs TDC if sCr rises and UOP < 750-800ml.  -Keep on Bumax 4mg BID  - renally dose all medications  - avoid nephrotoxins as much as possible.  -PRBC for anemia  
Anuric IVÁN on baseline CKD IV  Baseline Scr ~4; Scr at time of consult ~8.3  UA Trace protein; bland otherwise.   UPCR 0.61g  Renal US: not avaialbe. In 2023 showed MRD.  Suspect ATN given rapid increase in Scr and anuria, now making urine.  Patient with prior CKD V poor reserve. Chart reviewed in depth, no inciting cause of IVÁN (normal vanc troughs, no obvious nephrotoxins, normal CPK). Suspect insult occurred in setting of COVID/bacteremia.     Plan;  -will plan to hold RRT over the weekend unless emergent needs arise.  Will make a decision on Monday if he will need OP dialysis and TDC placement.  -Recommend starting on Bumex 4 mg PO daily  - renally dose all medications  - avoid nephrotoxins as much as possible.  -recommend PRBC tranfusion;  workup for melena   
Anuric IVÁN on baseline CKD IV  Baseline Scr ~4; Scr at time of consult ~8.3  UA Trace protein; bland otherwise.   UPCR 0.61g  Renal US: not avaialbe. In 2023 showed MRD.  Suspect ATN given rapid increase in Scr and anuria. Patient with prior CKD V poor reserve. Chart reviewed in depth, no inciting cause of IVÁN (normal vanc troughs, no obvious nephrotoxins, normal CPK). Suspect insult occurred in setting of COVID/bacteremia.     Plan;  - HD tomorrow, high risk of progressing to ESRD.  -Recommending Bumax 4mg BID  - renally dose all medications  - avoid nephrotoxins as much as possible.   
Anuric IVÁN on baseline CKD IV  Baseline Scr ~4; Scr at time of consult ~8.3  UA Trace protein; bland otherwise.   UPCR 0.61g  Renal US: not avaialbe. In 2023 showed MRD.  Suspect ATN given rapid increase in Scr and anuria. Patient with prior CKD V poor reserve. Chart reviewed in depth, no inciting cause of IVÁN (normal vanc troughs, no obvious nephrotoxins, normal CPK). Suspect insult occurred in setting of COVID/bacteremia.     Plan;  -HD done today, will hold off further RRT sessions, will monitor kidney functions if not improving can place TDC placement.  -Hold off diuretics for now..  -Can resume bumax 4mg BID if concerns for volume overload.  - renally dose all medications  - avoid nephrotoxins as much as possible.   
Anuric IVÁN on baseline CKD IV  Baseline Scr ~4; Scr at time of consult ~8.3  UA Trace protein; bland otherwise.   UPCR 0.61g  Renal US: not avaialbe. In 2023 showed MRD.  Suspect ATN given rapid increase in Scr and anuria. Patient with prior CKD V poor reserve. Chart reviewed in depth, no inciting cause of IVÁN (normal vanc troughs, no obvious nephrotoxins, normal CPK). Suspect insult occurred in setting of COVID/bacteremia.   Plan;    - no urgent indications for RRT at this time.   - bladder scan, insert goodman if evidence of retention  - renally dose all medications  - avoid nephrotoxins as much as possible.   
Anuric IVÁN on baseline CKD IV  Baseline Scr ~4; Scr at time of consult ~8.3  UA Trace protein; bland otherwise.   UPCR 0.61g  Renal US: not avaialbe. In 2023 showed MRD.  Suspect ATN given rapid increase in Scr and anuria. Patient with prior CKD V poor reserve. Chart reviewed in depth, no inciting cause of IVÁN (normal vanc troughs, no obvious nephrotoxins, normal CPK). Suspect insult occurred in setting of COVID/bacteremia.   Plan;  - Patient appears dry on exam, recommend LR @ 100cc/hr for 5 hrs.   - s/p temp dialysis line by primary team.   - no urgent indications for RRT at this time.   - obtain RFP BID  - bladder scan, insert goodman if evidence of retention  - obtain retroperitoneal US.   - renally dose all medications  - follow up lactic acid levels (if elevated would evaluate for cardiogenic shock).   - avoid nephrotoxins as much as possible.   
C/o of HA on admit with no improvement despite tylenol  - CTH done 4/16 with no acute processes  
C/o of HA with no improvement despite tylenol  - CTH done 4/16 with no acute processes  
C/o of HA with no improvement despite tylenol  - plan for CT head without contrast  
COVID positive  E. Fecalis bacteremia  Per primary team.   
Continue hydralazine  
Continue hydralazine  
Endocrinology consulted for BG management.   BG goal 140-180    - Novolog (Insulin Aspart) prn for BG excursions LDC SSI (150/50)  - BG checks AC/HS  - Hypoglycemia protocol in place    ** Please notify Endocrine for any change and/or advance in diet**  ** Please call Endocrine for any BG related issues **    Discharge Planning:   TBD. Please notify endocrinology prior to discharge.      
H/o VT/VF arrest. On Amiodarone  
H/o VT/VF arrest. On Amiodarone  
Infection may elevate BG readings  On IV antibiotics  Managed per primary team  Avoid hypoglycemia      
Managed per primary team  Avoid hypoglycemia      
Mgmt per primary team  
Mgmt per primary team  
Most likely 2/2 to ATN on CKD IV.      - As per nephrology, will decide on 4/29 whether patient will need HD moving forwards and will require a tunneled line placed.   - patient starting to produce urine. Creatinine stable.   - Will continue to monitor.  - Nephrology consulted. Appreciate recs.       
Most likely 2/2 to ATN on CKD IV.      - HD as per nephrology.   - patient starting to produce urine. Creatinine stable.   - Will continue to monitor.  - Nephrology consulted. Appreciate recs.       
Most likely 2/2 to ATN on CKD IV. Worsening. Expect creatinine to peak and come down.     - Continue to hold diuretic regimen.   - Will continue to monitor.  - Nephrology consulted. Appreciate recs.       
Most likely 2/2 to ATN on CKD IV. Worsening. Expect creatinine to peak and come down.     - HD as per nephrology.   - Will give diuretic challenge w/ IVP lasix 80mg.   - Will continue to monitor.  - Nephrology consulted. Appreciate recs.       
Most likely 2/2 to ATN on CKD IV. Worsening. Expect creatinine to peak and come down.     - HD as per nephrology.   - patient starting to produce urine. Creatinine stable.   - Will continue to monitor.  - Nephrology consulted. Appreciate recs.       
Mr. Radha Abbott is a very pleasant 63 yo male with stage D HFrEF, ICMP with previous admission for ADHF/CS. He did undergo HM3 placement placed by Dr Washington on 12/1/23.      4/16/24 he presented to the ED for infectious workup due to complaints of fevers of 101.5F associated with fatigue, generalized weakness, lower back pain and HA that started this morning. No evidence of drive line infection. No history of line infection.     - COVID +ve  - CT chest/ abdomen / pelvis 4/16 showed new RML opacity  - UA clean, blood cxs done 4/16 with NGTD  - Started Vanc and Zosyn and consulting ID  - continue tylenol for fevers  
Mr. Radha Abbott is a very pleasant 63 yo male with stage D HFrEF, ICMP with previous admission for ADHF/CS. He did undergo HM3 placement placed by Dr Washington on 12/1/23.      4/16/24 he presented to the ED for infectious workup due to complaints of fevers of 101.5F associated with fatigue, generalized weakness, lower back pain and HA that started this morning. No evidence of drive line infection. No history of line infection.     - COVID +ve  - CT chest/ abdomen / pelvis 4/16 showed new RML opacity  - UA clean, blood cxs from 4/16 +ve for E faecalis  - Vanc and Zosyn started on admit  - Appreciate ID's help. 3 day course of Remdesivir started  - TTE to R/O endocarditis, ICD lead infections.    - continue tylenol for fevers  
Mr. Radha Abbott is a very pleasant 63 yo male with stage D HFrEF, ICMP with previous admission for ADHF/CS. He did undergo HM3 placement placed by Dr Washington on 12/1/23.      Today he presents to the ED for infectious workup due to complaints of fevers of 101.5F associated with fatigue, generalized weakness, lower back pain and HA that started this morning. No evidence of drive line infection. No history of line infection.     - pending COVID/ flu swab  - ordered CT chest/ abdomen / pelvis WO to assess for possible source of infection.   - UA ordered    - will hold off antibiotics for now until clear source of infection  - continue tylenol for fevers  
Patient reports receipt of COVID primary vaccine series and 1 booster.  Patient is on room air.  Small nodular opacification to right lung may represent bacterial superinfection.    Plan  IV remdesivir for 3 days  May continue zosyn for now but will likely narrow his therapy tomorrow.  
Patient reports receipt of COVID primary vaccine series and 1 booster.  Patient is on room air.  Small nodular opacification to right lung may represent bacterial superinfection.    Plan  IV remdesivir for 3 days  On vancomycin and ampicillin.  
Per primary team.   
Procedure: Device Interrogation Including analysis of device parameters  Current Settings: Ventricular Assist Device  Review of device function is stable/unstable stable    Lengthy post op course complicated by vasoplegia(requiring Giaprezza), debility, malnutrition/impaired swallowing, and renal failure requiring HD(since weaned off). Once medically stable, he was transferred to Rehab for further PT/OT/ST.        4/16/2024    10:51 PM 4/11/2024    12:23 PM 3/27/2024    11:45 AM 3/27/2024     7:39 AM 3/27/2024     5:16 AM 3/26/2024     7:10 PM 3/26/2024     4:11 PM   TXP LVAD INTERROGATIONS   Type HeartMate3  HeartMate3 HeartMate3 HeartMate3 HeartMate3 HeartMate3   Flow 4.1  3.9 4.2 4 4 4.4   Speed 5050  5200 5000 5000 5000 5000   PI 4.1  6.5 3.7 4.8 4.9 3.3   Power ([place]) 3.4  3.8 3.4 3.4 3.4 3.4   Pulsatility Pulse No Pulse          Implant Date:12/1/23     Heartmate 3 RPM 5000     INR goal: 2-3   Bridge with Heparin   Antiplatelets: None d/t low H/H on implant   
Procedure: Device Interrogation Including analysis of device parameters  Current Settings: Ventricular Assist Device  Review of device function is stable/unstable stable    Lengthy post op course complicated by vasoplegia(requiring Giaprezza), debility, malnutrition/impaired swallowing, and renal failure requiring HD(since weaned off). Once medically stable, he was transferred to Rehab for further PT/OT/ST.        4/18/2024    12:42 PM 4/18/2024     8:23 AM 4/18/2024     5:02 AM 4/17/2024    11:13 PM 4/17/2024     8:40 PM 4/17/2024     4:00 PM 4/17/2024    12:03 PM   TXP LVAD INTERROGATIONS   Type HeartMate3 HeartMate3 HeartMate3 HeartMate3 HeartMate3 HeartMate3 HeartMate3   Flow 3.9 4.3 4 4.7 4.1 4.1 4.3   Speed 5000 5000 5050 5050 5000 5000 5000   PI 5.8 3.3 4.5 2.2 4.4 4.3 4.6   Power (Carrington) 3.5 3.5 3.4 3.4 3.4 3.4 3.5   LSL  4600   4600 4600 4600   Pulsatility Intermittent pulse Intermittent pulse Intermittent pulse Intermittent pulse Intermittent pulse Pulse Intermittent pulse     Implant Date:12/1/23     Heartmate 3 RPM 5000     INR goal: 2-3   Bridge with Heparin   Antiplatelets: None d/t low H/H on implant   
Uncontrolled HTN can worsen insulin resistance.   
Yes

## 2024-05-01 NOTE — SUBJECTIVE & OBJECTIVE
"Interval HPI:   Overnight events: No acute events overnight. Patient in room 311/311 A. Blood glucose stable. BG at and above goal on current insulin regimen (SSI ). Steroid use- None.    Renal function- Abnormal - Creatinine 4.3   Vasopressors-  None       Endocrine will continue to follow and manage insulin orders inpatient. Of note, patient refusing insulin with notable excursions.         Diet Renal     Eatin%  Nausea: No  Hypoglycemia and intervention: No  Fever: No  TPN and/or TF: Yes      BP (!) 94/0 (BP Location: Left arm, Patient Position: Lying)   Pulse 88   Temp 98.4 °F (36.9 °C) (Oral)   Resp 17   Ht 6' 2" (1.88 m)   Wt 82.9 kg (182 lb 12.2 oz)   SpO2 98%   BMI 23.47 kg/m²     Labs Reviewed and Include    No results for input(s): "GLU", "CALCIUM", "ALBUMIN", "PROT", "NA", "K", "CO2", "CL", "BUN", "CREATININE", "ALKPHOS", "ALT", "AST", "BILITOT" in the last 24 hours.    Lab Results   Component Value Date    WBC 5.92 2024    HGB 6.1 (L) 2024    HCT 20.2 (L) 2024    MCV 92 2024     2024     No results for input(s): "TSH", "FREET4" in the last 168 hours.  Lab Results   Component Value Date    HGBA1C 7.4 (H) 2024       Nutritional status:   Body mass index is 23.47 kg/m².  Lab Results   Component Value Date    ALBUMIN 2.5 (L) 2024    ALBUMIN 2.4 (L) 2024    ALBUMIN 2.5 (L) 2024     Lab Results   Component Value Date    PREALBUMIN 30 2024    PREALBUMIN 30 2024    PREALBUMIN 29 2024       Estimated Creatinine Clearance: 20.7 mL/min (A) (based on SCr of 4.3 mg/dL (H)).    Accu-Checks  Recent Labs     24  0820 24  1309 24  1955 24  0925 24  1154 24  1558 24  1935 24  0802 24  1216 24  2212   POCTGLUCOSE 138* 184* 199* 96 109 291* 183* 120* 251* 239*       Current Medications and/or Treatments Impacting Glycemic Control  Immunotherapy:    Immunosuppressants       " None          Steroids:   Hormones (From admission, onward)      None          Pressors:    Autonomic Drugs (From admission, onward)      None          Hyperglycemia/Diabetes Medications:   Antihyperglycemics (From admission, onward)      Start     Stop Route Frequency Ordered    04/17/24 0738  insulin aspart U-100 pen 0-5 Units         -- SubQ Before meals & nightly PRN 04/17/24 0638

## 2024-05-01 NOTE — NURSING
Patient is ready for discharge. Patient stable alert and oriented. Right subclavian tunneled line CDI for home infusions. Home infusion meds delivered to bedside prior to discharge. No complaints of pain. Discussed discharge plan. Reviewed medications and side effects, appointments, and answered questions with patient and spouse. Verbalized understanding, no further questions. Patient left the floor via wheelchair with PCT.

## 2024-05-02 ENCOUNTER — ANTI-COAG VISIT (OUTPATIENT)
Dept: CARDIOLOGY | Facility: CLINIC | Age: 64
End: 2024-05-02
Payer: MEDICAID

## 2024-05-02 DIAGNOSIS — Z95.811 LVAD (LEFT VENTRICULAR ASSIST DEVICE) PRESENT: Primary | ICD-10-CM

## 2024-05-02 PROCEDURE — G0180 MD CERTIFICATION HHA PATIENT: HCPCS | Mod: ,,, | Performed by: INTERNAL MEDICINE

## 2024-05-02 NOTE — PROGRESS NOTES
Pt wife confirmed correct dosing per calendar.   Message left w/ [facility] to confirm if INR can be drawn early AM on 5/3. Pt wife stated that home health came to see pt this morning already and may still be there. Pt wife confirmed that if HH was unable to have INR drawn tomorrow AM she is able to get pt to [facility] lab tomorrow.

## 2024-05-02 NOTE — PROGRESS NOTES
Spoke with Serena @ [facility], confirmed that she would pass the message a log that pt needs STAT INR done on 5/3 firs thing in AM. Advised that orders have been faxed.

## 2024-05-02 NOTE — PROGRESS NOTES
Admitted 4/16-5/1. Hospital course: Radha Abbott is a 64 y.o. male s/p HM3 from 12.1.23 admitted for evaluation of fevers, weakness, and fatigue. During his hospital stay Mr. Abbott was evaluated by the ID team. He was started on broad spectrum antibiotics. Blood cultures demonstrated growth of enterococcus faecalis. His antibiotic regimen was narrowed to ceftriaxone/ampicillin with eot estimated 5.30. His inr today is 1.9. He will be able to discharge today following picc line insertion for home abx administration. Plan to continue previous home warfarin dose (3.75mg TuTh; 2.5mg other days).

## 2024-05-03 ENCOUNTER — LAB VISIT (OUTPATIENT)
Dept: LAB | Facility: HOSPITAL | Age: 64
End: 2024-05-03
Attending: INTERNAL MEDICINE
Payer: MEDICAID

## 2024-05-03 ENCOUNTER — ANTI-COAG VISIT (OUTPATIENT)
Dept: CARDIOLOGY | Facility: CLINIC | Age: 64
End: 2024-05-03
Payer: MEDICAID

## 2024-05-03 DIAGNOSIS — J18.9 UNRESOLVED PNEUMONIA: Primary | ICD-10-CM

## 2024-05-03 DIAGNOSIS — Z95.811 LVAD (LEFT VENTRICULAR ASSIST DEVICE) PRESENT: Primary | ICD-10-CM

## 2024-05-03 LAB
INR PPP: 2 (ref 0.8–1.2)
PROTHROMBIN TIME: 21.2 SEC (ref 9–12.5)

## 2024-05-03 PROCEDURE — 85610 PROTHROMBIN TIME: CPT | Performed by: INTERNAL MEDICINE

## 2024-05-03 PROCEDURE — 36415 COLL VENOUS BLD VENIPUNCTURE: CPT | Performed by: INTERNAL MEDICINE

## 2024-05-03 PROCEDURE — 93793 ANTICOAG MGMT PT WARFARIN: CPT | Mod: ,,,

## 2024-05-03 NOTE — PROGRESS NOTES
[facility]    2024 2:28 PM    Assessment/Plan:    Patient presents today with therapeutic INR.    Assessment of patient findings and chart review: no significant findings     Recommendation for patient's warfarin regimen: Continue current maintenance dose    Recommend repeat INR Tuesday  _________________________________________________________________    Radha Abbott (64 y.o.) is followed by the [facility].    Anticoagulation Summary  As of 5/3/2024      INR goal:  2.0-3.0   TTR:  51.5% (2.2 mo)   INR used for dosin.0 (5/3/2024)   Warfarin maintenance plan:  3.75 mg (2.5 mg x 1.5) every Tue, Th; 2.5 mg (2.5 mg x 1) all other days   Weekly warfarin total:  20 mg   Plan last modified:  Joycelyn Angel, PharmD (2024)   Next INR check:  2024   Target end date:      Indications    LVAD (left ventricular assist device) present [Z95.811]                 Anticoagulation Episode Summary       INR check location:      Preferred lab:      Send INR reminders to:  [facility] COUMADIN LVAD    Comments:  [facility]  (ph [phone], fax [phone])  // [facility]          Anticoagulation Care Providers       Provider Role Specialty Phone number    Sajan Hurley MD [facility] Cardiology [phone]

## 2024-05-07 ENCOUNTER — LAB VISIT (OUTPATIENT)
Dept: LAB | Facility: HOSPITAL | Age: 64
End: 2024-05-07
Attending: INTERNAL MEDICINE
Payer: MEDICAID

## 2024-05-07 ENCOUNTER — ANTI-COAG VISIT (OUTPATIENT)
Dept: CARDIOLOGY | Facility: CLINIC | Age: 64
End: 2024-05-07
Payer: MEDICAID

## 2024-05-07 DIAGNOSIS — Z95.811 LVAD (LEFT VENTRICULAR ASSIST DEVICE) PRESENT: Primary | ICD-10-CM

## 2024-05-07 DIAGNOSIS — I48.0 PAROXYSMAL ATRIAL FIBRILLATION: Primary | ICD-10-CM

## 2024-05-07 LAB
INR PPP: 2 (ref 0.8–1.2)
PROTHROMBIN TIME: 22.1 SEC (ref 9–12.5)

## 2024-05-07 PROCEDURE — 93793 ANTICOAG MGMT PT WARFARIN: CPT | Mod: ,,,

## 2024-05-07 PROCEDURE — 85610 PROTHROMBIN TIME: CPT | Performed by: INTERNAL MEDICINE

## 2024-05-07 NOTE — PROGRESS NOTES
[facility]    2024 12:41 PM    Assessment/Plan:    Patient presents today with therapeutic INR.    Assessment of patient findings and chart review: no significant findings     Recommendation for patient's warfarin regimen: Continue current maintenance dose    Recommend repeat INR in 1 week  _________________________________________________________________    Radha Abbott (64 y.o.) is followed by the [facility].    Anticoagulation Summary  As of 2024      INR goal:  2.0-3.0   TTR:  54.2% (2.3 mo)   INR used for dosin.0 (2024)   Warfarin maintenance plan:  3.75 mg (2.5 mg x 1.5) every Tue, Thu; 2.5 mg (2.5 mg x 1) all other days   Weekly warfarin total:  20 mg   Plan last modified:  Joycelyn Angel, PharmD (2024)   Next INR check:  2024   Target end date:      Indications    LVAD (left ventricular assist device) present [Z95.811]                 Anticoagulation Episode Summary       INR check location:      Preferred lab:      Send INR reminders to:  [facility] COUMADIN LVAD    Comments:  [facility]  (ph [phone], fax [phone])  // [facility]          Anticoagulation Care Providers       Provider Role Specialty Phone number    Sajan Hurley MD [facility] Cardiology [phone]

## 2024-05-08 ENCOUNTER — PATIENT MESSAGE (OUTPATIENT)
Dept: CARDIOTHORACIC SURGERY | Facility: CLINIC | Age: 64
End: 2024-05-08
Payer: MEDICAID

## 2024-05-08 DIAGNOSIS — Z95.811 LVAD (LEFT VENTRICULAR ASSIST DEVICE) PRESENT: Primary | ICD-10-CM

## 2024-05-10 ENCOUNTER — OFFICE VISIT (OUTPATIENT)
Dept: INFECTIOUS DISEASES | Facility: CLINIC | Age: 64
End: 2024-05-10
Payer: MEDICAID

## 2024-05-10 DIAGNOSIS — Z12.11 COLON CANCER SCREENING: ICD-10-CM

## 2024-05-10 DIAGNOSIS — R78.81 BACTEREMIA: Primary | ICD-10-CM

## 2024-05-10 PROCEDURE — 99215 OFFICE O/P EST HI 40 MIN: CPT | Mod: S$PBB,,, | Performed by: INTERNAL MEDICINE

## 2024-05-10 PROCEDURE — 99213 OFFICE O/P EST LOW 20 MIN: CPT | Mod: PBBFAC | Performed by: INTERNAL MEDICINE

## 2024-05-10 PROCEDURE — 1160F RVW MEDS BY RX/DR IN RCRD: CPT | Mod: CPTII,,, | Performed by: INTERNAL MEDICINE

## 2024-05-10 PROCEDURE — 1159F MED LIST DOCD IN RCRD: CPT | Mod: CPTII,,, | Performed by: INTERNAL MEDICINE

## 2024-05-10 PROCEDURE — 3051F HG A1C>EQUAL 7.0%<8.0%: CPT | Mod: CPTII,,, | Performed by: INTERNAL MEDICINE

## 2024-05-10 PROCEDURE — 99999 PR PBB SHADOW E&M-EST. PATIENT-LVL III: CPT | Mod: PBBFAC,,, | Performed by: INTERNAL MEDICINE

## 2024-05-10 PROCEDURE — 1111F DSCHRG MED/CURRENT MED MERGE: CPT | Mod: CPTII,,, | Performed by: INTERNAL MEDICINE

## 2024-05-10 PROCEDURE — 3066F NEPHROPATHY DOC TX: CPT | Mod: CPTII,,, | Performed by: INTERNAL MEDICINE

## 2024-05-10 NOTE — PROGRESS NOTES
Subjective     Patient ID: Radha Abbott is a 64 y.o. male.    Chief Complaint:Follow-up      History of Present Illness    64 year old male with a history of CKD stage 4, HFrEF s/p LVAD placement in December of 2023 and AICD placement. Patient was recently  admitted to the hospital with a 4 day history of fatigue and fevers. He denied any other symptoms. COVID test was positive.  Blood cultures were positive for E faecalis.  Source of his bacteremia was unclear.  LVAD DLES was clean and dry.  TTE was negative for vegetation.  He was started on IV ampicillin and IV ceftriaxone for 6 weeks empirically.  He cleared his blood cultures.  A tunneled line was placed in his right upper chest and he was discharged home.  He is here today for follow up.  He is doing well and denies complaints.    Social History  Quit smoking in December of 2023.    Review of Systems   Constitutional: Positive for night sweats. Negative for chills, decreased appetite, fever, malaise/fatigue, weight gain and weight loss.   HENT:  Negative for congestion, ear pain, hearing loss, hoarse voice, sore throat and tinnitus.    Eyes:  Negative for blurred vision, redness and visual disturbance.   Cardiovascular:  Positive for leg swelling. Negative for chest pain and palpitations.   Respiratory:  Positive for shortness of breath. Negative for cough, hemoptysis, sputum production and wheezing.    Hematologic/Lymphatic: Negative for adenopathy. Does not bruise/bleed easily.   Skin:  Positive for itching. Negative for rash and suspicious lesions.   Musculoskeletal:  Positive for back pain. Negative for joint pain, myalgias and neck pain.   Gastrointestinal:  Positive for diarrhea and nausea. Negative for abdominal pain, constipation, heartburn and vomiting.   Genitourinary:  Negative for dysuria, flank pain, frequency, hematuria, hesitancy and urgency.   Neurological:  Positive for headaches. Negative for dizziness, numbness, paresthesias and weakness.    Psychiatric/Behavioral:  Negative for depression and memory loss. The patient has insomnia. The patient is not nervous/anxious.       Objective   Physical Exam  Vitals and nursing note reviewed.   Constitutional:       Appearance: Normal appearance. He is not ill-appearing.   Pulmonary:      Effort: No respiratory distress.      Breath sounds: Normal breath sounds.   Abdominal:      Comments: Scant drainage around drive line exit site.   Musculoskeletal:      Right lower leg: No edema.      Left lower leg: No edema.   Neurological:      General: No focal deficit present.      Mental Status: He is alert and oriented to person, place, and time.      Cranial Nerves: No cranial nerve deficit.                      Assessment and Plan     1. Bacteremia :  Patient with history of LVAD and AICD admitted with E faecalis bacteremia.  TTE was negative.  Patient discharged on 6 weeks of therapy empirically for endocarditis.  Patient is to continue on ampicillin and ceftriaxone until may 30.  After completion of IV therapy, I would suggest he is started on amoxicillin for long term suppression.   2. Colon cancer screening :  Will refer for colonoscopy.       Plan:  Bacteremia    Colon cancer screening  -     Ambulatory referral/consult to Endo Procedure ; Future; Expected date: 05/11/2024

## 2024-05-14 ENCOUNTER — LAB VISIT (OUTPATIENT)
Dept: LAB | Facility: HOSPITAL | Age: 64
End: 2024-05-14
Attending: INTERNAL MEDICINE
Payer: MEDICAID

## 2024-05-14 ENCOUNTER — ANTI-COAG VISIT (OUTPATIENT)
Dept: CARDIOLOGY | Facility: CLINIC | Age: 64
End: 2024-05-14
Payer: MEDICAID

## 2024-05-14 DIAGNOSIS — R78.81 BACTEREMIA: Primary | ICD-10-CM

## 2024-05-14 DIAGNOSIS — Z95.811 LVAD (LEFT VENTRICULAR ASSIST DEVICE) PRESENT: Primary | ICD-10-CM

## 2024-05-14 LAB
ALBUMIN SERPL BCP-MCNC: 2.7 G/DL (ref 3.5–5.2)
ALP SERPL-CCNC: 103 U/L (ref 55–135)
ALT SERPL W/O P-5'-P-CCNC: 19 U/L (ref 10–44)
ANION GAP SERPL CALC-SCNC: 17 MMOL/L (ref 8–16)
AST SERPL-CCNC: 23 U/L (ref 10–40)
BASOPHILS # BLD AUTO: 0.03 K/UL (ref 0–0.2)
BASOPHILS NFR BLD: 0.6 % (ref 0–1.9)
BILIRUB SERPL-MCNC: 0.1 MG/DL (ref 0.1–1)
BUN SERPL-MCNC: 59 MG/DL (ref 8–23)
CALCIUM SERPL-MCNC: 8.7 MG/DL (ref 8.7–10.5)
CHLORIDE SERPL-SCNC: 99 MMOL/L (ref 95–110)
CO2 SERPL-SCNC: 26 MMOL/L (ref 23–29)
CREAT SERPL-MCNC: 5.5 MG/DL (ref 0.5–1.4)
DIFFERENTIAL METHOD BLD: ABNORMAL
EOSINOPHIL # BLD AUTO: 0.3 K/UL (ref 0–0.5)
EOSINOPHIL NFR BLD: 5.9 % (ref 0–8)
ERYTHROCYTE [DISTWIDTH] IN BLOOD BY AUTOMATED COUNT: 16.6 % (ref 11.5–14.5)
EST. GFR  (NO RACE VARIABLE): 11 ML/MIN/1.73 M^2
GLUCOSE SERPL-MCNC: 196 MG/DL (ref 70–110)
HCT VFR BLD AUTO: 22.1 % (ref 40–54)
HGB BLD-MCNC: 7.1 G/DL (ref 14–18)
IMM GRANULOCYTES # BLD AUTO: 0.04 K/UL (ref 0–0.04)
IMM GRANULOCYTES NFR BLD AUTO: 0.8 % (ref 0–0.5)
LYMPHOCYTES # BLD AUTO: 0.5 K/UL (ref 1–4.8)
LYMPHOCYTES NFR BLD: 9.9 % (ref 18–48)
MCH RBC QN AUTO: 28.9 PG (ref 27–31)
MCHC RBC AUTO-ENTMCNC: 32.1 G/DL (ref 32–36)
MCV RBC AUTO: 90 FL (ref 82–98)
MONOCYTES # BLD AUTO: 0.5 K/UL (ref 0.3–1)
MONOCYTES NFR BLD: 9.5 % (ref 4–15)
NEUTROPHILS # BLD AUTO: 3.5 K/UL (ref 1.8–7.7)
NEUTROPHILS NFR BLD: 73.3 % (ref 38–73)
NRBC BLD-RTO: 0 /100 WBC
PLATELET # BLD AUTO: 329 K/UL (ref 150–450)
PMV BLD AUTO: 10.1 FL (ref 9.2–12.9)
POTASSIUM SERPL-SCNC: 3.2 MMOL/L (ref 3.5–5.1)
PROT SERPL-MCNC: 7.2 G/DL (ref 6–8.4)
RBC # BLD AUTO: 2.46 M/UL (ref 4.6–6.2)
SODIUM SERPL-SCNC: 142 MMOL/L (ref 136–145)
WBC # BLD AUTO: 4.75 K/UL (ref 3.9–12.7)

## 2024-05-14 PROCEDURE — 80053 COMPREHEN METABOLIC PANEL: CPT | Performed by: INTERNAL MEDICINE

## 2024-05-14 PROCEDURE — 93793 ANTICOAG MGMT PT WARFARIN: CPT | Mod: ,,,

## 2024-05-14 PROCEDURE — 85025 COMPLETE CBC W/AUTO DIFF WBC: CPT | Performed by: INTERNAL MEDICINE

## 2024-05-14 PROCEDURE — 36415 COLL VENOUS BLD VENIPUNCTURE: CPT | Performed by: INTERNAL MEDICINE

## 2024-05-14 NOTE — PROGRESS NOTES
Arlyn reports correct Warfarin dose, nausea and diarrhea  for 2 weeks due to taking Ceftriaxone/Ampicillin, increased stress and depression, no other changes reported.

## 2024-05-14 NOTE — PROGRESS NOTES
[facility]    05/14/2024 2:01 PM    Assessment/Plan:    Patient presents today with supratherapeutic INR.    Assessment of patient findings and chart review: reports diarrhea since starting antibiotics - will advise he contact VAD team    Recommendation for patient's warfarin regimen: Lower dose today to 2.5mg then resume current maintenance dose    Recommend repeat INR Thursday  _________________________________________________________________    Radha Abbott (64 y.o.) is followed by the [facility].    Anticoagulation Summary  As of 5/14/2024      INR goal:  2.0-3.0   TTR:  56.9% (2.6 mo)   INR used for dosing:  3.2 (5/14/2024)   Warfarin maintenance plan:  3.75 mg (2.5 mg x 1.5) every Tue, Thu; 2.5 mg (2.5 mg x 1) all other days   Weekly warfarin total:  20 mg   Plan last modified:  Joycelyn Angel, PharmD (4/4/2024)   Next INR check:  5/16/2024   Target end date:      Indications    LVAD (left ventricular assist device) present [Z95.811]                 Anticoagulation Episode Summary       INR check location:      Preferred lab:      Send INR reminders to:  [facility] COUMADIN LVAD    Comments:  [facility] (ph [phone], fax [phone])  // [facility]          Anticoagulation Care Providers       Provider Role Specialty Phone number    Sajan Hurley MD [facility] Cardiology [phone]

## 2024-05-16 ENCOUNTER — ANTI-COAG VISIT (OUTPATIENT)
Dept: CARDIOLOGY | Facility: CLINIC | Age: 64
End: 2024-05-16
Payer: MEDICAID

## 2024-05-16 ENCOUNTER — LAB VISIT (OUTPATIENT)
Dept: LAB | Facility: HOSPITAL | Age: 64
End: 2024-05-16
Attending: INTERNAL MEDICINE
Payer: MEDICAID

## 2024-05-16 DIAGNOSIS — Z95.811 LVAD (LEFT VENTRICULAR ASSIST DEVICE) PRESENT: Primary | ICD-10-CM

## 2024-05-16 DIAGNOSIS — I48.0 PAROXYSMAL ATRIAL FIBRILLATION: Primary | ICD-10-CM

## 2024-05-16 LAB
INR PPP: 3 (ref 0.8–1.2)
PROTHROMBIN TIME: 30.7 SEC (ref 9–12.5)

## 2024-05-16 PROCEDURE — 93793 ANTICOAG MGMT PT WARFARIN: CPT | Mod: ,,,

## 2024-05-16 PROCEDURE — 85610 PROTHROMBIN TIME: CPT | Performed by: INTERNAL MEDICINE

## 2024-05-16 PROCEDURE — 36415 COLL VENOUS BLD VENIPUNCTURE: CPT | Performed by: INTERNAL MEDICINE

## 2024-05-16 NOTE — PROGRESS NOTES
[facility]    05/16/2024 1:07 PM    Assessment/Plan:    Patient presents today with therapeutic INR.    Assessment of patient findings and chart review: no significant findings     Recommendation for patient's warfarin regimen: Continue current maintenance dose    Recommend repeat INR Monday  _________________________________________________________________    Radha Abbott (64 y.o.) is followed by the [facility].    Anticoagulation Summary  As of 5/16/2024      INR goal:  2.0-3.0   TTR:  55.4% (2.6 mo)   INR used for dosing:  3.0 (5/16/2024)   Warfarin maintenance plan:  3.75 mg (2.5 mg x 1.5) every Tue, Thu; 2.5 mg (2.5 mg x 1) all other days   Weekly warfarin total:  20 mg   Plan last modified:  Joycelyn Angel, PharmD (4/4/2024)   Next INR check:  5/20/2024   Target end date:      Indications    LVAD (left ventricular assist device) present [Z95.811]                 Anticoagulation Episode Summary       INR check location:      Preferred lab:      Send INR reminders to:  [facility] COUMADIN LVAD    Comments:  [facility]  (ph [phone], fax [phone])  // [facility]          Anticoagulation Care Providers       Provider Role Specialty Phone number    Sajan Hurley MD [facility] Cardiology [phone]

## 2024-05-20 ENCOUNTER — LAB VISIT (OUTPATIENT)
Dept: LAB | Facility: HOSPITAL | Age: 64
End: 2024-05-20
Attending: INTERNAL MEDICINE
Payer: MEDICAID

## 2024-05-20 ENCOUNTER — TELEPHONE (OUTPATIENT)
Dept: TRANSPLANT | Facility: CLINIC | Age: 64
End: 2024-05-20
Payer: MEDICAID

## 2024-05-20 ENCOUNTER — ANTI-COAG VISIT (OUTPATIENT)
Dept: CARDIOLOGY | Facility: CLINIC | Age: 64
End: 2024-05-20
Payer: MEDICAID

## 2024-05-20 DIAGNOSIS — R78.81 BACTEREMIA: Primary | ICD-10-CM

## 2024-05-20 DIAGNOSIS — Z95.811 LVAD (LEFT VENTRICULAR ASSIST DEVICE) PRESENT: Primary | ICD-10-CM

## 2024-05-20 DIAGNOSIS — I48.0 PAROXYSMAL ATRIAL FIBRILLATION: ICD-10-CM

## 2024-05-20 LAB
ALBUMIN SERPL BCP-MCNC: 2.8 G/DL (ref 3.5–5.2)
ALP SERPL-CCNC: 96 U/L (ref 55–135)
ALT SERPL W/O P-5'-P-CCNC: 20 U/L (ref 10–44)
ANION GAP SERPL CALC-SCNC: 16 MMOL/L (ref 8–16)
AST SERPL-CCNC: 25 U/L (ref 10–40)
BASOPHILS # BLD AUTO: 0.04 K/UL (ref 0–0.2)
BASOPHILS NFR BLD: 0.8 % (ref 0–1.9)
BILIRUB SERPL-MCNC: 0.2 MG/DL (ref 0.1–1)
BUN SERPL-MCNC: 57 MG/DL (ref 8–23)
CALCIUM SERPL-MCNC: 8.9 MG/DL (ref 8.7–10.5)
CHLORIDE SERPL-SCNC: 98 MMOL/L (ref 95–110)
CO2 SERPL-SCNC: 26 MMOL/L (ref 23–29)
CREAT SERPL-MCNC: 5.8 MG/DL (ref 0.5–1.4)
DIFFERENTIAL METHOD BLD: ABNORMAL
EOSINOPHIL # BLD AUTO: 0.5 K/UL (ref 0–0.5)
EOSINOPHIL NFR BLD: 9 % (ref 0–8)
ERYTHROCYTE [DISTWIDTH] IN BLOOD BY AUTOMATED COUNT: 16.7 % (ref 11.5–14.5)
EST. GFR  (NO RACE VARIABLE): 10 ML/MIN/1.73 M^2
GLUCOSE SERPL-MCNC: 102 MG/DL (ref 70–110)
HCT VFR BLD AUTO: 22.4 % (ref 40–54)
HGB BLD-MCNC: 7.1 G/DL (ref 14–18)
IMM GRANULOCYTES # BLD AUTO: 0.02 K/UL (ref 0–0.04)
IMM GRANULOCYTES NFR BLD AUTO: 0.4 % (ref 0–0.5)
INR PPP: 3.3 (ref 0.8–1.2)
LYMPHOCYTES # BLD AUTO: 0.6 K/UL (ref 1–4.8)
LYMPHOCYTES NFR BLD: 11.4 % (ref 18–48)
MCH RBC QN AUTO: 28.5 PG (ref 27–31)
MCHC RBC AUTO-ENTMCNC: 31.7 G/DL (ref 32–36)
MCV RBC AUTO: 90 FL (ref 82–98)
MONOCYTES # BLD AUTO: 0.4 K/UL (ref 0.3–1)
MONOCYTES NFR BLD: 8 % (ref 4–15)
NEUTROPHILS # BLD AUTO: 3.7 K/UL (ref 1.8–7.7)
NEUTROPHILS NFR BLD: 70.4 % (ref 38–73)
NRBC BLD-RTO: 0 /100 WBC
PLATELET # BLD AUTO: 315 K/UL (ref 150–450)
PMV BLD AUTO: 9.6 FL (ref 9.2–12.9)
POTASSIUM SERPL-SCNC: 3.1 MMOL/L (ref 3.5–5.1)
PROT SERPL-MCNC: 7.4 G/DL (ref 6–8.4)
PROTHROMBIN TIME: 35.5 SEC (ref 9–12.5)
RBC # BLD AUTO: 2.49 M/UL (ref 4.6–6.2)
SODIUM SERPL-SCNC: 140 MMOL/L (ref 136–145)
WBC # BLD AUTO: 5.25 K/UL (ref 3.9–12.7)

## 2024-05-20 PROCEDURE — 36415 COLL VENOUS BLD VENIPUNCTURE: CPT | Performed by: INTERNAL MEDICINE

## 2024-05-20 PROCEDURE — 85610 PROTHROMBIN TIME: CPT | Performed by: INTERNAL MEDICINE

## 2024-05-20 PROCEDURE — 80053 COMPREHEN METABOLIC PANEL: CPT | Performed by: INTERNAL MEDICINE

## 2024-05-20 PROCEDURE — 85025 COMPLETE CBC W/AUTO DIFF WBC: CPT | Performed by: INTERNAL MEDICINE

## 2024-05-20 PROCEDURE — 93793 ANTICOAG MGMT PT WARFARIN: CPT | Mod: ,,,

## 2024-05-20 NOTE — PROGRESS NOTES
Arlyn reports correct Warfarin dose, n&v after eating 5/15/24, had 1/3 cup of spinach last week as advised, no other other changes reported.

## 2024-05-20 NOTE — PROGRESS NOTES
[facility]    05/20/2024 1:54 PM    Assessment/Plan:    Patient presents today with supratherapeutic INR.    Assessment of patient findings and chart review: no significant findings     Recommendation for patient's warfarin regimen: Lower dose today to 1.25mg then decrease maintenance dose    Recommend repeat INR Thursday  _________________________________________________________________    Emmanuelleaimee Abbott (64 y.o.) is followed by the [facility].    Anticoagulation Summary  As of 5/20/2024      INR goal:  2.0-3.0   TTR:  52.8% (2.8 mo)   INR used for dosing:  3.3 (5/20/2024)   Warfarin maintenance plan:  3.75 mg (2.5 mg x 1.5) every Thu; 2.5 mg (2.5 mg x 1) all other days   Weekly warfarin total:  18.75 mg   Plan last modified:  Jesenia Jensen, PharmD (5/20/2024)   Next INR check:  5/23/2024   Target end date:      Indications    LVAD (left ventricular assist device) present [Z95.811]                 Anticoagulation Episode Summary       INR check location:      Preferred lab:      Send INR reminders to:  [facility] COUMADIN LVAD    Comments:  [facility] ( [phone], fax [phone])  // [employer]          Anticoagulation Care Providers       Provider Role Specialty Phone number    Sajan Hurley MD [facility] [phone]

## 2024-05-20 NOTE — TELEPHONE ENCOUNTER
Called patient and wife answered, K has been low x3 weeks as patient has been getting labs for what appears to be ID. Routed to provider to assess. Communicated information to wife who verbalized understanding.

## 2024-05-20 NOTE — PROGRESS NOTES
Pt getting weekly bacteremia labs, K has been low on last 3 checks, he is on diuretics but was told to stop K at some point, CR is 5.8 he does have K at home, takes Bumex 4mg BID and has Metolazone 1x a week and PRN. Any recommendations?

## 2024-05-21 ENCOUNTER — EXTERNAL HOME HEALTH (OUTPATIENT)
Dept: HOME HEALTH SERVICES | Facility: HOSPITAL | Age: 64
End: 2024-05-21
Payer: MEDICAID

## 2024-05-23 ENCOUNTER — ANTI-COAG VISIT (OUTPATIENT)
Dept: CARDIOLOGY | Facility: CLINIC | Age: 64
End: 2024-05-23
Payer: MEDICAID

## 2024-05-23 ENCOUNTER — OFFICE VISIT (OUTPATIENT)
Dept: TRANSPLANT | Facility: CLINIC | Age: 64
End: 2024-05-23
Payer: MEDICAID

## 2024-05-23 ENCOUNTER — CLINICAL SUPPORT (OUTPATIENT)
Dept: TRANSPLANT | Facility: CLINIC | Age: 64
End: 2024-05-23
Payer: MEDICAID

## 2024-05-23 ENCOUNTER — OFFICE VISIT (OUTPATIENT)
Dept: INFECTIOUS DISEASES | Facility: CLINIC | Age: 64
End: 2024-05-23
Payer: MEDICAID

## 2024-05-23 ENCOUNTER — CLINICAL SUPPORT (OUTPATIENT)
Dept: NEPHROLOGY | Facility: CLINIC | Age: 64
End: 2024-05-23
Payer: MEDICAID

## 2024-05-23 ENCOUNTER — LAB VISIT (OUTPATIENT)
Dept: LAB | Facility: HOSPITAL | Age: 64
End: 2024-05-23
Payer: MEDICAID

## 2024-05-23 ENCOUNTER — TELEPHONE (OUTPATIENT)
Dept: TRANSPLANT | Facility: CLINIC | Age: 64
End: 2024-05-23
Payer: MEDICAID

## 2024-05-23 VITALS — BODY MASS INDEX: 23.36 KG/M2 | HEIGHT: 74 IN | HEART RATE: 88 BPM | WEIGHT: 182 LBS | SYSTOLIC BLOOD PRESSURE: 88 MMHG

## 2024-05-23 VITALS
BODY MASS INDEX: 23.37 KG/M2 | DIASTOLIC BLOOD PRESSURE: 86 MMHG | HEART RATE: 70 BPM | HEIGHT: 74 IN | WEIGHT: 182.13 LBS | SYSTOLIC BLOOD PRESSURE: 118 MMHG | TEMPERATURE: 98 F

## 2024-05-23 DIAGNOSIS — Z95.811 PRESENCE OF VENTRICULAR ASSIST DEVICE: ICD-10-CM

## 2024-05-23 DIAGNOSIS — I48.0 PAF (PAROXYSMAL ATRIAL FIBRILLATION): ICD-10-CM

## 2024-05-23 DIAGNOSIS — I50.42 CHRONIC COMBINED SYSTOLIC AND DIASTOLIC HEART FAILURE: ICD-10-CM

## 2024-05-23 DIAGNOSIS — E78.5 HYPERLIPIDEMIA, UNSPECIFIED HYPERLIPIDEMIA TYPE: ICD-10-CM

## 2024-05-23 DIAGNOSIS — Z95.811 HEART REPLACED BY HEART ASSIST DEVICE: Primary | ICD-10-CM

## 2024-05-23 DIAGNOSIS — I25.118 CORONARY ARTERY DISEASE OF NATIVE ARTERY OF NATIVE HEART WITH STABLE ANGINA PECTORIS: ICD-10-CM

## 2024-05-23 DIAGNOSIS — I47.20 VENTRICULAR TACHYCARDIA: ICD-10-CM

## 2024-05-23 DIAGNOSIS — Z95.811 LVAD (LEFT VENTRICULAR ASSIST DEVICE) PRESENT: Primary | ICD-10-CM

## 2024-05-23 DIAGNOSIS — Z95.811 LVAD (LEFT VENTRICULAR ASSIST DEVICE) PRESENT: ICD-10-CM

## 2024-05-23 DIAGNOSIS — N18.4 CHRONIC KIDNEY DISEASE, STAGE 4 (SEVERE): ICD-10-CM

## 2024-05-23 DIAGNOSIS — Z79.899 AT RISK FOR AMIODARONE TOXICITY WITH LONG TERM USE: ICD-10-CM

## 2024-05-23 DIAGNOSIS — E87.6 HYPOKALEMIA: ICD-10-CM

## 2024-05-23 DIAGNOSIS — R78.81 BACTEREMIA: Primary | ICD-10-CM

## 2024-05-23 DIAGNOSIS — E11.9 TYPE 2 DIABETES MELLITUS WITHOUT COMPLICATION, WITHOUT LONG-TERM CURRENT USE OF INSULIN: ICD-10-CM

## 2024-05-23 DIAGNOSIS — Z95.811 PRESENCE OF VENTRICULAR ASSIST DEVICE: Primary | ICD-10-CM

## 2024-05-23 DIAGNOSIS — Z91.89 AT RISK FOR AMIODARONE TOXICITY WITH LONG TERM USE: ICD-10-CM

## 2024-05-23 LAB
ALBUMIN SERPL BCP-MCNC: 2.6 G/DL (ref 3.5–5.2)
ALP SERPL-CCNC: 115 U/L (ref 55–135)
ALT SERPL W/O P-5'-P-CCNC: 28 U/L (ref 10–44)
ANION GAP SERPL CALC-SCNC: 15 MMOL/L (ref 8–16)
AST SERPL-CCNC: 40 U/L (ref 10–40)
BASOPHILS # BLD AUTO: 0.04 K/UL (ref 0–0.2)
BASOPHILS NFR BLD: 0.7 % (ref 0–1.9)
BILIRUB DIRECT SERPL-MCNC: 0.1 MG/DL (ref 0.1–0.3)
BILIRUB SERPL-MCNC: 0.2 MG/DL (ref 0.1–1)
BNP SERPL-MCNC: 1425 PG/ML (ref 0–99)
BUN SERPL-MCNC: 59 MG/DL (ref 8–23)
CALCIUM SERPL-MCNC: 8.8 MG/DL (ref 8.7–10.5)
CHLORIDE SERPL-SCNC: 98 MMOL/L (ref 95–110)
CHOLEST SERPL-MCNC: 143 MG/DL (ref 120–199)
CHOLEST/HDLC SERPL: 3 {RATIO} (ref 2–5)
CO2 SERPL-SCNC: 27 MMOL/L (ref 23–29)
CREAT SERPL-MCNC: 6.4 MG/DL (ref 0.5–1.4)
CRP SERPL-MCNC: 40.5 MG/L (ref 0–8.2)
DIFFERENTIAL METHOD BLD: ABNORMAL
EOSINOPHIL # BLD AUTO: 0.3 K/UL (ref 0–0.5)
EOSINOPHIL NFR BLD: 4.8 % (ref 0–8)
ERYTHROCYTE [DISTWIDTH] IN BLOOD BY AUTOMATED COUNT: 17.2 % (ref 11.5–14.5)
EST. GFR  (NO RACE VARIABLE): 9.2 ML/MIN/1.73 M^2
GLUCOSE SERPL-MCNC: 158 MG/DL (ref 70–110)
HCT VFR BLD AUTO: 20.3 % (ref 40–54)
HDLC SERPL-MCNC: 48 MG/DL (ref 40–75)
HDLC SERPL: 33.6 % (ref 20–50)
HGB BLD-MCNC: 6.4 G/DL (ref 14–18)
IMM GRANULOCYTES # BLD AUTO: 0.03 K/UL (ref 0–0.04)
IMM GRANULOCYTES NFR BLD AUTO: 0.5 % (ref 0–0.5)
INR PPP: 3.7 (ref 0.8–1.2)
LDH SERPL L TO P-CCNC: 339 U/L (ref 110–260)
LDLC SERPL CALC-MCNC: 77.8 MG/DL (ref 63–159)
LYMPHOCYTES # BLD AUTO: 0.5 K/UL (ref 1–4.8)
LYMPHOCYTES NFR BLD: 8.1 % (ref 18–48)
MAGNESIUM SERPL-MCNC: 1.8 MG/DL (ref 1.6–2.6)
MCH RBC QN AUTO: 29.1 PG (ref 27–31)
MCHC RBC AUTO-ENTMCNC: 31.5 G/DL (ref 32–36)
MCV RBC AUTO: 92 FL (ref 82–98)
MONOCYTES # BLD AUTO: 0.4 K/UL (ref 0.3–1)
MONOCYTES NFR BLD: 6.7 % (ref 4–15)
NEUTROPHILS # BLD AUTO: 4.6 K/UL (ref 1.8–7.7)
NEUTROPHILS NFR BLD: 79.2 % (ref 38–73)
NONHDLC SERPL-MCNC: 95 MG/DL
NRBC BLD-RTO: 0 /100 WBC
PHOSPHATE SERPL-MCNC: 4.6 MG/DL (ref 2.7–4.5)
PLATELET # BLD AUTO: 273 K/UL (ref 150–450)
PMV BLD AUTO: 10.4 FL (ref 9.2–12.9)
POTASSIUM SERPL-SCNC: 3.2 MMOL/L (ref 3.5–5.1)
PREALB SERPL-MCNC: 28 MG/DL (ref 20–43)
PROT SERPL-MCNC: 7.4 G/DL (ref 6–8.4)
PROTHROMBIN TIME: 37.1 SEC (ref 9–12.5)
RBC # BLD AUTO: 2.2 M/UL (ref 4.6–6.2)
SODIUM SERPL-SCNC: 140 MMOL/L (ref 136–145)
T4 SERPL-MCNC: 6.4 UG/DL (ref 4.5–11.5)
TRIGL SERPL-MCNC: 86 MG/DL (ref 30–150)
TSH SERPL DL<=0.005 MIU/L-ACNC: 0.84 UIU/ML (ref 0.4–4)
WBC # BLD AUTO: 5.79 K/UL (ref 3.9–12.7)

## 2024-05-23 PROCEDURE — 3066F NEPHROPATHY DOC TX: CPT | Mod: CPTII,,, | Performed by: INTERNAL MEDICINE

## 2024-05-23 PROCEDURE — 1160F RVW MEDS BY RX/DR IN RCRD: CPT | Mod: CPTII,,, | Performed by: INTERNAL MEDICINE

## 2024-05-23 PROCEDURE — 1111F DSCHRG MED/CURRENT MED MERGE: CPT | Mod: CPTII,,, | Performed by: INTERNAL MEDICINE

## 2024-05-23 PROCEDURE — 3051F HG A1C>EQUAL 7.0%<8.0%: CPT | Mod: CPTII,,, | Performed by: INTERNAL MEDICINE

## 2024-05-23 PROCEDURE — 80053 COMPREHEN METABOLIC PANEL: CPT | Performed by: INTERNAL MEDICINE

## 2024-05-23 PROCEDURE — 85610 PROTHROMBIN TIME: CPT | Performed by: INTERNAL MEDICINE

## 2024-05-23 PROCEDURE — 99214 OFFICE O/P EST MOD 30 MIN: CPT | Mod: PBBFAC | Performed by: INTERNAL MEDICINE

## 2024-05-23 PROCEDURE — 1159F MED LIST DOCD IN RCRD: CPT | Mod: CPTII,,, | Performed by: INTERNAL MEDICINE

## 2024-05-23 PROCEDURE — 84436 ASSAY OF TOTAL THYROXINE: CPT | Performed by: INTERNAL MEDICINE

## 2024-05-23 PROCEDURE — 99215 OFFICE O/P EST HI 40 MIN: CPT | Mod: S$PBB,,, | Performed by: INTERNAL MEDICINE

## 2024-05-23 PROCEDURE — 99215 OFFICE O/P EST HI 40 MIN: CPT | Mod: PBBFAC,27 | Performed by: INTERNAL MEDICINE

## 2024-05-23 PROCEDURE — 3008F BODY MASS INDEX DOCD: CPT | Mod: CPTII,,, | Performed by: INTERNAL MEDICINE

## 2024-05-23 PROCEDURE — 3079F DIAST BP 80-89 MM HG: CPT | Mod: CPTII,,, | Performed by: INTERNAL MEDICINE

## 2024-05-23 PROCEDURE — 3074F SYST BP LT 130 MM HG: CPT | Mod: CPTII,,, | Performed by: INTERNAL MEDICINE

## 2024-05-23 PROCEDURE — 85025 COMPLETE CBC W/AUTO DIFF WBC: CPT | Performed by: INTERNAL MEDICINE

## 2024-05-23 PROCEDURE — 99214 OFFICE O/P EST MOD 30 MIN: CPT | Mod: S$PBB,,, | Performed by: INTERNAL MEDICINE

## 2024-05-23 PROCEDURE — 83735 ASSAY OF MAGNESIUM: CPT | Performed by: INTERNAL MEDICINE

## 2024-05-23 PROCEDURE — 84443 ASSAY THYROID STIM HORMONE: CPT | Performed by: INTERNAL MEDICINE

## 2024-05-23 PROCEDURE — 83615 LACTATE (LD) (LDH) ENZYME: CPT | Performed by: INTERNAL MEDICINE

## 2024-05-23 PROCEDURE — 86140 C-REACTIVE PROTEIN: CPT | Performed by: INTERNAL MEDICINE

## 2024-05-23 PROCEDURE — 93750 INTERROGATION VAD IN PERSON: CPT | Mod: S$PBB,,, | Performed by: INTERNAL MEDICINE

## 2024-05-23 PROCEDURE — 99999 PR PBB SHADOW E&M-EST. PATIENT-LVL V: CPT | Mod: PBBFAC,,, | Performed by: INTERNAL MEDICINE

## 2024-05-23 PROCEDURE — 84134 ASSAY OF PREALBUMIN: CPT | Performed by: INTERNAL MEDICINE

## 2024-05-23 PROCEDURE — 99999 PR PBB SHADOW E&M-EST. PATIENT-LVL IV: CPT | Mod: PBBFAC,,, | Performed by: INTERNAL MEDICINE

## 2024-05-23 PROCEDURE — 84100 ASSAY OF PHOSPHORUS: CPT | Performed by: INTERNAL MEDICINE

## 2024-05-23 PROCEDURE — 36415 COLL VENOUS BLD VENIPUNCTURE: CPT | Performed by: INTERNAL MEDICINE

## 2024-05-23 PROCEDURE — 83880 ASSAY OF NATRIURETIC PEPTIDE: CPT | Performed by: INTERNAL MEDICINE

## 2024-05-23 PROCEDURE — 82248 BILIRUBIN DIRECT: CPT | Performed by: INTERNAL MEDICINE

## 2024-05-23 PROCEDURE — 80061 LIPID PANEL: CPT | Performed by: INTERNAL MEDICINE

## 2024-05-23 RX ORDER — POTASSIUM CHLORIDE 20 MEQ/1
20 TABLET, EXTENDED RELEASE ORAL DAILY
Qty: 30 TABLET | Refills: 11 | Status: CANCELLED | OUTPATIENT
Start: 2024-05-23

## 2024-05-23 NOTE — TELEPHONE ENCOUNTER
Patient is being seen in clinic today. Team to review medication changes at that time.  ----- Message from Brayan Ocampo MD sent at 5/22/2024  2:49 PM CDT -----  Lets give 40 meq of K today and then 20 meq daily with labs next week  ----- Message -----  From: Ashanti Duncan RN  Sent: 5/20/2024   3:35 PM CDT  To: Brayan Ocampo MD; Alva Barry RN; #    Pt getting weekly bacteremia labs, K has been low on last 3 checks, he is on diuretics but was told to stop K at some point, CR is 5.8 he does have K at home, takes Bumex 4mg BID and has Metolazone 1x a week and PRN. Any recommendations?

## 2024-05-23 NOTE — PROGRESS NOTES
"Subjective:   Patient ID:  Radha Abbott is a 64 y.o. male who presents for LVAD followup visit.      Implant Date:12/1/23       Heartmate 3 RPM 5000     INR goal: 2-3   Bridge with Heparin   Antiplatelets: None d/t low H/H on implant         5/23/2024    11:40 AM 5/1/2024     3:27 PM 5/1/2024    12:23 PM 5/1/2024     8:25 AM 5/1/2024     5:02 AM 4/30/2024    11:51 PM 4/30/2024     9:00 PM   TXP PINA INTERROGATIONS   Type HeartMate3         Flow 3.6         Speed 5000         PI 7.2         Power ([place]) 3.4         LSL 4600         Pulsatility Intermittent pulse Intermittent pulse Pulse Pulse Pulse Pulse Pulse       HPI  Mr. Radha Abbott is a 63 yo male with stage D HFrEF, ICMP  Who underwent HM3 placement placed by Dr Washington on 12/1/23. Lengthy post op course complicated by vasoplegia(requiring Giaprezza), debility, malnutrition/impaired swallowing, and renal failure requiring HD (since weaned off). Once medically stable, he was transferred to Rehab for further PT/OT/ST. He has done really well in rehab and now had continued to progress well at home. Unfortunately, he was recently readmitted to our service after being treated for COVID/pneumonia.Today comes for a follow-up visit. Has no complaints.  VAD speed is at 5000 rpm. No VAD alarms noted on interrogation occasional PI events . BP is 88 mm of Hg. DLES is a "1". INR is supratherapeutic at 3.7. LDh is at baseline.      2D Echo with CFD done on 3/26/2024  LVAD: There is a Heartmate III LVAD running at 5000 RPM. The aortic valve does not open. The ventricular septum is at midline. Inflow cannula well seated at the apex. Outflow graft not visualized.    Left Ventricle: The left ventricle is moderately dilated. Normal wall thickness. Global hypokinesis present. Septal motion is abnormal. There is severely reduced systolic function with a visually estimated ejection fraction of 5 - 10%. There is diastolic dysfunction but grade cannot be determined.    Right Ventricle: " "Mild right ventricular enlargement. Wall thickness is normal. Right ventricle wall motion  is normal. Systolic function is normal. Pacemaker lead present in the ventricle.    Left Atrium: Left atrium is severely dilated.    Right Atrium: Right atrium is moderately dilated.    Mitral Valve: The mitral valve is repaired with an Noble stitch. There is mild regurgitation.    IVC/SVC: Normal venous pressure at 3 mmHg.    Review of Systems   Constitutional: Negative. Negative for chills, decreased appetite, diaphoresis, fever, malaise/fatigue, night sweats, weight gain and weight loss.   Eyes: Negative.    Cardiovascular:  Negative for chest pain, claudication, cyanosis, dyspnea on exertion, irregular heartbeat, leg swelling, near-syncope, orthopnea, palpitations, paroxysmal nocturnal dyspnea and syncope.   Respiratory:  Negative for cough, hemoptysis and shortness of breath.    Endocrine: Negative.    Hematologic/Lymphatic: Negative.    Skin:  Negative for color change, dry skin and nail changes.   Musculoskeletal: Negative.    Gastrointestinal: Negative.    Genitourinary: Negative.    Neurological:  Negative for weakness.       Objective:   Blood pressure (!) 88/0, pulse 88, height 6' 2" (1.88 m), weight 82.6 kg (182 lb).body mass index is unknown because there is no height or weight on file.    Doppler: 88    Physical Exam  Vitals reviewed.   Constitutional:       Appearance: He is well-developed.      Comments: There were no vitals taken for this visit.     HENT:      Head: Normocephalic.   Neck:      Vascular: No carotid bruit or JVD.   Cardiovascular:      Chest Wall: PMI is not displaced.      Heart sounds: No murmur heard.     Comments: Smooth VAD hum. DLES is "1"  Pulmonary:      Effort: Pulmonary effort is normal.      Breath sounds: Normal breath sounds.   Abdominal:      General: Bowel sounds are normal.      Palpations: Abdomen is soft.   Skin:     General: Skin is warm.   Neurological:      Mental Status: " He is alert.         Lab Results   Component Value Date    WBC 5.79 05/23/2024    HGB 6.4 (L) 05/23/2024    HCT 20.3 (L) 05/23/2024    MCV 92 05/23/2024     05/23/2024    CO2 26 05/20/2024    CREATININE 5.8 (H) 05/20/2024    CALCIUM 8.9 05/20/2024    ALBUMIN 2.8 (L) 05/20/2024    AST 25 05/20/2024    BNP 1,115 (H) 05/01/2024    ALT 20 05/20/2024     05/01/2024       Lab Results   Component Value Date    INR 3.7 (H) 05/23/2024    INR 3.3 (H) 05/20/2024    INR 3.0 (H) 05/16/2024       BNP   Date Value Ref Range Status   05/01/2024 1,115 (H) 0 - 99 pg/mL Final     Comment:     Values of less than 100 pg/ml are consistent with non-CHF populations.   04/29/2024 858 (H) 0 - 99 pg/mL Final     Comment:     Values of less than 100 pg/ml are consistent with non-CHF populations.   04/26/2024 1,562 (H) 0 - 99 pg/mL Final     Comment:     Values of less than 100 pg/ml are consistent with non-CHF populations.       LD   Date Value Ref Range Status   05/01/2024 235 110 - 260 U/L Final     Comment:     Results are increased in hemolyzed samples.   04/30/2024 247 110 - 260 U/L Final     Comment:     Results are increased in hemolyzed samples.   04/29/2024 229 110 - 260 U/L Final     Comment:     Results are increased in hemolyzed samples.         Assessment:      1. Presence of ventricular assist device    2. Chronic combined systolic and diastolic heart failure    3. Coronary artery disease of native artery of native heart with stable angina pectoris    4. Type 2 diabetes mellitus without complication, without long-term current use of insulin    5. PAF (paroxysmal atrial fibrillation)        Plan:   Patient is now NYHA class II.Owing to his advanced kidney disease and significantly elevated creat, he is not on a  ACEI/ARB/ARNI or MRA. He is also not on a beta blocker due to RV dysfunction.   Low k today so was given 25 meq of Kx1.    BP is controlled.  INR is supratherapeutic.   VAD interrogation was performed in  clinic  Any VAD management kits dispensed today medically necessary  Recommend 2 gram sodium restriction and 1500cc fluid restriction.  Encourage physical activity with graded exercise program.  Requested patient to weigh themselves daily, and to notify us if their weight increases by more than 3 lbs in 1 day or 5 lbs in 1 week.   Additionally, the patient has a patient centered goal of being able to get stronger  RTC in 1 month     Patient advised that it is recommended that all patients, and their close contacts and household members receive Covid vaccination.     Listed for transplant: No        Brayan Ocampo MD  [facility] Patient Status  Functional Status: 40% - Disabled: requires special care and assistance  Physical Capacity: No Limitations  Working for Income: No  If no, reason not working: Demands of Treatment    Advance Care Planning     Date: 05/23/2024  Patient has ACP docs in file.        Brayan Ocampo MD

## 2024-05-23 NOTE — PROGRESS NOTES
Infectious Disease Clinic Note  [unfilled]      Subjective:       Patient ID: Radha Abbott is a 64 y.o. male being seen for an new visit.    Chief Complaint: Follow-up    HPI    64 year old male with a history of LVAD placement in December 1, 2023 and AICD in place admitted on 4/16 with E faecalis bacteremia presents for hospital follow-up.  Source of bacteremia remained unclear.  Per patient's wife, his drive line exit site looked clean.   TTE was negative for vegetation.  However TTE does not rule out valvular or lead vegetation, so decision was made to treat for presumptive endocarditis for 6 weeks with ceftriaxone and ampicillin- EOT 5/30/24. Seen in ID clinic 5/10. Was tolerating treatment at the time.          Reports feeling well. Denies fevers, chills, sweats, diarrhea. Has decreased appetite and at times N/V. Colonoscopy ordered at last visit, but pt hasn't been scheduled.         No family history on file.  Social History     Socioeconomic History    Marital status:    Tobacco Use    Smoking status: Former     Current packs/day: 0.50     Types: Cigarettes   Substance and Sexual Activity    Alcohol use: Yes     Comment: rarely    Drug use: No    Sexual activity: Not Currently     Partners: Female     Social Determinants of Health     Financial Resource Strain: High Risk (3/26/2024)    Overall Financial Resource Strain (CARDIA)     Difficulty of Paying Living Expenses: Hard   Food Insecurity: Food Insecurity Present (3/26/2024)    Hunger Vital Sign     Worried About Running Out of Food in the Last Year: Sometimes true     Ran Out of Food in the Last Year: Often true   Transportation Needs: No Transportation Needs (3/26/2024)    PRAPARE - Transportation     Lack of Transportation (Medical): No     Lack of Transportation (Non-Medical): No   Physical Activity: Inactive (2/29/2024)    Exercise Vital Sign     Days of Exercise per Week: 2 days     Minutes of Exercise per Session: 0 min   Stress: Stress  Concern Present (3/26/2024)    [employer] - Occupational Stress Questionnaire     Feeling of Stress : To some extent   Housing Stability: High Risk (3/26/2024)    Housing Stability Vital Sign     Unable to Pay for Housing in the Last Year: Yes     Number of Places Lived in the Last Year: 1     Unstable Housing in the Last Year: No     Past Surgical History:   Procedure Laterality Date    ANGIOPLASTY-VENOUS ARTERY Right 12/1/2023    Procedure: ANGIOPLASTY-VENOUS ARTERY, RIGHT FEMORAL;  Surgeon: Yuri Washington MD;  Location: [facility] OR [address] [place] [address];  Service: Cardiovascular;  Laterality: Right;    AORTIC VALVULOPLASTY N/A 12/1/2023    Procedure: REPAIR, AORTIC VALVE;  Surgeon: Yuri Washington MD;  Location: [facility] OR [address] [place] [address];  Service: Cardiovascular;  Laterality: N/A;    CARDIAC SURGERY      CLOSURE N/A 12/1/2023    Procedure: CLOSURE, TEMPORARY;  Surgeon: Yuri Washington MD;  [facility];  Service: Cardiovascular;  Laterality: N/A;    DRAINAGE OF PLEURAL EFFUSION  12/4/2023    Procedure: DRAINAGE, PLEURAL EFFUSION;  Surgeon: Yuri Washington MD;  Location: [facility] OR [address] [place] [address];  Service: Cardiovascular;;    INSERTION OF GRAFT TO PERICARDIUM  12/4/2023    Procedure: INSERTION, GRAFT, PERICARDIUM;  Surgeon: Yuri Washington MD;  [facility];  Service: Cardiovascular;;    INSERTION OF INTRA-AORTIC BALLOON ASSIST DEVICE Right 11/21/2023    Procedure: INSERTION, INTRA-AORTIC BALLOON PUMP;  Surgeon: Finn Cohn MD;  Location: [facility] CATH LAB;  Service: Cardiology;  Laterality: Right;    LEFT VENTRICULAR ASSIST DEVICE Left 12/1/2023    Procedure: INSERTION-LEFT VENTRICULAR ASSIST DEVICE;  Surgeon: Yuri Washington MD;  Location: [facility] OR [address] [place] [address];  Service: Cardiovascular;  Laterality: Left;  REDO STERNOTOMY - REDO SAW NEEDED FOR CASE    LYSIS OF ADHESIONS  12/1/2023    Procedure: LYSIS, ADHESIONS;  Surgeon: Yuri Washington MD;  [facility];  Service:  Cardiovascular;;    PLACEMENT OF SWAN ROLANDO CATHETER WITH IMAGING GUIDANCE  11/20/2023    Procedure: INSERTION, CATHETER, SWAN-ROLANDO, WITH IMAGING GUIDANCE;  Surgeon: Sajan Hurley MD;  Location: [facility] CATH LAB;  Service: Cardiology;;    REMOVAL OF TUNNELED CENTRAL VENOUS CATHETER (CVC) N/A 3/1/2024    Procedure: REMOVAL, CATHETER, CENTRAL VENOUS, TUNNELED;  Surgeon: Seble Aguilar MD;  Location: [facility] CATH LAB;  Service: Interventional Nephrology;  Laterality: N/A;    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 12/1/2023    Procedure: REPAIR, ANEURYSM, ARTERY, FEMORAL;  Surgeon: Yuri Washington MD;  [facility];  Service: Cardiovascular;  Laterality: Right;  Right Femoral Artery Repair    RIGHT HEART CATHETERIZATION Right 10/10/2023    Procedure: INSERTION, CATHETER, RIGHT HEART;  Surgeon: Bin Gandhi MD;  Location: [facility] CATH LAB;  Service: Cardiology;  Laterality: Right;    RIGHT HEART CATHETERIZATION Right 10/13/2023    Procedure: INSERTION, CATHETER, RIGHT HEART;  Surgeon: Walter Mcintyre MD;  Location: [facility] CATH LAB;  Service: Cardiology;  Laterality: Right;    RIGHT HEART CATHETERIZATION  11/13/2023    RIGHT HEART CATHETERIZATION Right 11/13/2023    Procedure: INSERTION, CATHETER, RIGHT HEART;  Surgeon: Juventino Bermudez Jr., MD;  Location: [facility] CATH LAB;  Service: Cardiology;  Laterality: Right;    RIGHT HEART CATHETERIZATION Right 11/20/2023    Procedure: INSERTION, CATHETER, RIGHT HEART;  Surgeon: Sajan Hurley MD;  Location: [facility] CATH LAB;  Service: Cardiology;  Laterality: Right;    RIGHT HEART CATHETERIZATION Right 1/22/2024    Procedure: INSERTION, CATHETER, RIGHT HEART;  Surgeon: Brayan Ocampo MD;  Location: [facility] CATH LAB;  Service: Cardiology;  Laterality: Right;    STERNAL WOUND CLOSURE N/A 12/4/2023    Procedure: CLOSURE, WOUND, STERNUM;  Surgeon: Yuri Washington MD;  [facility];  Service: Cardiovascular;  Laterality: N/A;    STERNOTOMY N/A  12/1/2023    Procedure: STERNOTOMY, REDO;  Surgeon: Yuri Washington MD;  [facility];  Service: Cardiovascular;  Laterality: N/A;    VALVULOPLASTY, MITRAL VALVE N/A 12/1/2023    Procedure: VALVULOPLASTY, MITRAL VALVE;  Surgeon: Yuri Washington MD;  Location: [facility] OR 2ND FLR;  Service: Cardiovascular;  Laterality: N/A;       Patient's Medications   New Prescriptions    No medications on file   Previous Medications    ACETAMINOPHEN (TYLENOL) 500 MG TABLET    Take 2 tablets (1,000 mg total) by mouth every 8 (eight) hours as needed for Pain.    AMIODARONE (PACERONE) 400 MG TABLET    Take 1 tablet (400 mg total) by mouth once daily.    BUMETANIDE (BUMEX) 2 MG TABLET    Take 2 tablets (4 mg total) by mouth 2 (two) times a day.    DEXTROSE 5 % IN WATER (D5W) PGBK 100 ML WITH CEFTRIAXONE 2 GRAM SOLR 2 G    Inject 2 g into the vein every 12 (twelve) hours.    FAMOTIDINE (PEPCID) 20 MG TABLET    Take 1 tablet (20 mg total) by mouth once daily.    FLUTICASONE PROPIONATE (FLONASE) 50 MCG/ACTUATION NASAL SPRAY    2 sprays (100 mcg total) by Each Nostril route once daily.    GABAPENTIN (NEURONTIN) 100 MG CAPSULE    Take 1 capsule (100 mg total) by mouth 2 (two) times daily.    HYDRALAZINE (APRESOLINE) 100 MG TABLET    Take 1 tablet (100 mg total) by mouth every 8 (eight) hours.    MAGNESIUM OXIDE (MAG-OX) 400 MG (241.3 MG MAGNESIUM) TABLET    Take 1 tablet (400 mg total) by mouth once daily.    METOLAZONE (ZAROXOLYN) 5 MG TABLET    Take 1 tablet (5 mg total) by mouth once weekly as needed on Wednesdays only if >5lb weight gain in 1 week    OMEGA-3 ACID ETHYL ESTERS (LOVAZA) 1 GRAM CAPSULE    Take 2 capsules (2 g total) by mouth 2 (two) times daily.    PULSE OXIMETER (PULSE OXIMETER) DEVICE    by Apply Externally route 2 (two) times a day. Use twice daily at 8 AM and 3 PM and [facility].    SENNA-DOCUSATE 8.6-50 MG (PERICOLACE) 8.6-50 MG PER TABLET    Take 2 tablets by mouth daily as  needed for Constipation.    SODIUM BICARBONATE 325 MG TABLET    Take 1 tablet (325 mg total) by mouth 3 (three) times daily.    SODIUM CHLORIDE 0.9 % PGBK 100 ML WITH AMPICILLIN 2 GRAM SOLR 2 G    Inject 2 g into the vein every 12 (twelve) hours.    TRAZODONE (DESYREL) 50 MG TABLET    Take 0.5 tablets (25 mg total) by mouth every evening.    VENLAFAXINE (EFFEXOR) 37.5 MG TAB    Take 1 tablet (37.5 mg total) by mouth once daily.    VITAMIN D (VITAMIN D3) 1000 UNITS TAB    Take 1 tablet (1,000 Units total) by mouth once daily.    WARFARIN (COUMADIN) 2.5 MG TABLET    Take 3.75mg orally daily on Tuesday and Thursdays. Take 2.5mg orally daily on all other days.   Modified Medications    No medications on file   Discontinued Medications    No medications on file       Patient Active Problem List    Diagnosis Date Noted    Anemia 04/26/2024    Acute systolic heart failure 04/24/2024    Pneumonia of right lung due to infectious organism 04/19/2024    Hypertension 04/17/2024    Weakness 04/17/2024    Hyponatremia 04/17/2024     POA  Daily labs      Bacteremia 04/17/2024    Pneumonia due to COVID-19 virus 04/16/2024    Headache 04/16/2024    End stage heart failure 03/11/2024    IVÁN (acute kidney injury) 03/11/2024     POA  Cr at baseline      At risk for amiodarone toxicity with long term use 02/15/2024    Stage 4 chronic kidney disease 02/15/2024    Anemia due to chronic kidney disease, on chronic dialysis 01/22/2024    Impaired mobility 01/18/2024    Parotid mass 01/06/2024    Lymphadenopathy 01/05/2024    At high risk for skin breakdown 01/05/2024    Adjustment disorder with depressed mood 01/02/2024    Deep tissue injury 01/02/2024    Blood blister 01/02/2024    Shortness of breath 12/18/2023    Moderate protein-calorie malnutrition 12/14/2023    Dysphonia 12/14/2023    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    Oliguria 12/14/2023    Depressed mood 12/12/2023    Congestive heart failure 12/09/2023     "Abnormal CXR 12/07/2023    Acute encephalopathy 12/07/2023    LVAD (left ventricular assist device) present 12/01/2023    Palliative care encounter 11/17/2023    Advance care planning 11/17/2023    Defibrillator discharge 10/18/2023    Acute on chronic combined systolic and diastolic CHF, NYHA class 4 10/17/2023    Ventricular fibrillation 10/12/2023    PAF (paroxysmal atrial fibrillation) 10/11/2023    Ventricular tachycardia 10/07/2023    CAD (coronary artery disease) 10/07/2023     Continue home medications        Acute on chronic combined systolic and diastolic heart failure 10/07/2023    Type 2 diabetes mellitus without complication, without long-term current use of insulin 10/07/2023       Review of Systems   Review of Systems   Constitutional:  Negative for chills, fever and malaise/fatigue.        Always cold   HENT:  Negative for congestion and sore throat.    Eyes:  Negative for blurred vision and double vision.   Respiratory:  Negative for cough and shortness of breath.    Cardiovascular:  Negative for chest pain and palpitations.   Gastrointestinal:  Positive for nausea. Negative for diarrhea and vomiting.   Musculoskeletal:  Negative for myalgias and neck pain.   Skin:  Negative for itching and rash.   Neurological:  Negative for dizziness and headaches.   Psychiatric/Behavioral:  Negative for substance abuse. The patient is not nervous/anxious.    All other systems reviewed and are negative.          Objective:      /86 (BP Location: Left arm)   Pulse 70   Temp 97.6 °F (36.4 °C) (Oral)   Ht 6' 2" (1.88 m)   Wt 82.6 kg (182 lb 1.6 oz)   BMI 23.38 kg/m²   Estimated body mass index is 23.38 kg/m² as calculated from the following:    Height as of this encounter: 6' 2" (1.88 m).    Weight as of this encounter: 82.6 kg (182 lb 1.6 oz).    Physical Exam  Vitals and nursing note reviewed.   Constitutional:       Appearance: Normal appearance. He is not ill-appearing.   HENT:      Head: Normocephalic " and atraumatic.      Nose: Nose normal. No congestion.      Mouth/Throat:      Mouth: Mucous membranes are moist.      Pharynx: Oropharynx is clear.   Eyes:      Conjunctiva/sclera: Conjunctivae normal.      Pupils: Pupils are equal, round, and reactive to light.   Cardiovascular:      Comments: LVAD hum present  Rt chest- tunneled picc- clean dressing  Pulmonary:      Effort: Pulmonary effort is normal. No respiratory distress.      Breath sounds: Normal breath sounds.   Abdominal:      General: Abdomen is flat. There is no distension.      Palpations: Abdomen is soft.      Comments: Clean dressing at DLES   Musculoskeletal:         General: No swelling. Normal range of motion.      Cervical back: Normal range of motion and neck supple.   Skin:     General: Skin is warm and dry.   Neurological:      General: No focal deficit present.      Mental Status: He is alert and oriented to person, place, and time.   Psychiatric:         Mood and Affect: Mood normal.         Behavior: Behavior normal.         Assessment:         1. Bacteremia  Ambulatory referral/consult to Interventional Radiology    IR Tunneled Cath Removal w/o Port    Ambulatory referral/consult to Endo Procedure             Plan:       Radha was seen today for follow-up.    Diagnoses and all orders for this visit:    Bacteremia  Hx of LVAD and AICD admitted with E faecalis bacteremia concerning for possible endocarditis. Patient is to continue on ampicillin and ceftriaxone until may 30. After completion of IV therapy, plan to start renally dosed amoxicillin for long term suppression.   -     Ambulatory referral/consult to Interventional Radiology; Future- for line removal  -     IR Tunneled Cath Removal w/o Port; Future  -     Ambulatory referral/consult to Endo Procedure ; Future- for cancer screening    Rtc for end of care  Ana Cavazos MD  Infectious Disease     Total professional time spent for the encounter: 35  minutes  Time was spent preparing to see the patient, reviewing results of prior testing, obtaining and/or reviewing separately obtained history, performing a medically appropriate examination and interview, counseling and educating the patient/family, ordering medications/tests/procedures, referring and communicating with other health care professionals, documenting clinical information in the electronic health record, and independently interpreting results.       No follow-ups on file.

## 2024-05-23 NOTE — Clinical Note
Virgil Mendez! I met with this pt, his spouse, and his sister for ESRD treatment choices yesterday. The pt/pt's spouse expressed most interest in PD for RRT. The pt's spouse did have some questions regarding the pt's LVAD and occasionally not being able to get a blood pressure reading. The pt's spouse was asking how this may affect him doing a home modality. I told her I was not sure as I have not been asked this questions before--I told the pt's spouse I would try to get more information and follow-up.  Have you ever experienced a pt who had an LVAD and was also on dialysis? I'm remembering now that most dialysis companies ask specifically if a pt has an LVAD on the medical screening when starting the dialysis referral. I'm concerned the pt having an LVAD may make his dialysis referral more complicated, but wanted to see if you had any additional information/thoughts!  Thanks, Reyna
English

## 2024-05-23 NOTE — PROCEDURES
5/23/2024    11:40 AM 5/1/2024     3:27 PM 5/1/2024    12:23 PM 5/1/2024     8:25 AM 5/1/2024     5:02 AM 4/30/2024    11:51 PM 4/30/2024     9:00 PM   TXP PINA INTERROGATIONS   Type HeartMate3         Flow 3.6         Speed 5000         PI 7.2         Power ([place]) 3.4         LSL 4600         Pulsatility Intermittent pulse Intermittent pulse Pulse Pulse Pulse Pulse Pulse   }
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
## 2024-05-23 NOTE — PROGRESS NOTES
[facility]    05/23/2024 3:26 PM    Assessment/Plan:    Patient presents today with supratherapeutic INR.    Assessment of patient findings and chart review: reports decreased appetite    Recommendation for patient's warfarin regimen: Hold dose today then decrease maintenance dose    Recommend repeat INR Monday  _________________________________________________________________    Radha Abbott (64 y.o.) is followed by the [facility].    Anticoagulation Summary  As of 5/23/2024      INR goal:  2.0-3.0   TTR:  50.9% (2.9 mo)   INR used for dosing:  3.7 (5/23/2024)   Warfarin maintenance plan:  2.5 mg (2.5 mg x 1) every day   Weekly warfarin total:  17.5 mg   Plan last modified:  Jesenia Jensen, PharmD (5/23/2024)   Next INR check:  5/27/2024   Target end date:      Indications    LVAD (left ventricular assist device) present [Z95.811]                 Anticoagulation Episode Summary       INR check location:      Preferred lab:      Send INR reminders to:  [facility] COUMADIN LVAD    Comments:  [facility]  ( [phone], fax [phone])  // [facility]          Anticoagulation Care Providers       Provider Role Specialty Phone number    Sajan Hurley MD [facility] [phone]

## 2024-05-23 NOTE — LETTER
May 23, 2024        Kevin Franklin  [address] [place] [address]  Phone: [phone]  Fax: [phone]             [facility]  [address]  [facility] [address]  Phone: [phone]   Patient: Radha Abbott   MR Number: [id number]   YOB: 1960   Date of Visit: 5/23/2024       Dear Dr. Kevin Franklin    Thank you for referring Radha Abbott to me for evaluation. Attached you will find relevant portions of my assessment and plan of care.    If you have questions, please do not hesitate to call me. I look forward to following Radha Abbott along with you.    Sincerely,    Brayan Ocampo MD    Enclosure    If you would like to receive this communication electronically, please contact [email or web address] or [phone] to request [employer] Link access.    [employer] Link is a tool which provides read-only access to select patient information with whom you have a relationship. Its easy to use and provides real time access to review your patients record including encounter summaries, notes, results, and demographic information.    If you feel you have received this communication in error or would no longer like to receive these types of communications, please e-mail [email or web address]

## 2024-05-24 ENCOUNTER — DOCUMENT SCAN (OUTPATIENT)
Dept: HOME HEALTH SERVICES | Facility: HOSPITAL | Age: 64
End: 2024-05-24
Payer: MEDICAID

## 2024-05-24 NOTE — PROGRESS NOTES
Radha Abbott was referred to ESRD treatment choices education by Dr. Mendez    The patient attended class in an individual setting accompanied by spouse Arlyn and sister Lorene.    Previous knowledge of dialysis/transplant: Minimal. Pt reports knowing someone on dialysis previously but was not familiar with the process. Pt's spouse reports that pt was on dialysis temporarily during a previous hospital admission for an IVÁN.  Lifestyle/Hobbies/Personal Values/Cultural Considerations: Pt lives with his spouse in [place] and does not work. Pt's spouse is working.  Psychosocial support: Pt's spouse and family members.  Living Situation: Patient lives with spouse. Pt has no pets.  Preferred learning style: visual and auditory.  Readiness to learn: Patient denies any pain that would interfere with learning. Patient reports readiness to learn.    Advanced Education (Lesson 4, 5, 6)    Lesson 4 Choices for Treating Advanced Kidney Disease (The Basics)  Lesson Objectives  By the end of each session, participants will be able to:  Identify the five treatment choices for kidney failure (in-center HD, home HD, peritoneal dialysis, kidney transplant, active medical management without dialysis)  State how hemodialysis and peritoneal dialysis differ in frequency of treatment   Topics & Points Covered  You have treatment choices, and you can change your mind.  No renal replacement therapy also known as conservative management  Active medical care without RRT  Palliative care when necessary  Complications can be managed, but can't compensate for uremia  End of life choices - living will - telling family  Can change your mind  Hemodialysis (HD)  Two types  In-center (most common)  Home hemo: treatments are done at home, requires assistance, you have more control, can be harder on the helpers.  How it works  PROs and CONs  The diet (in-center is most restrictive, only 3 times/week, build up between treatments)  Lifestyle: social  "aspects  Safety/infection  Peritoneal dialysis (PD)  Two types  Continuous ambulatory PD  Continuous cycling PD  How it works  PROs and CONs  Storage space for supplies (delivered monthly)  Diet (blood is cleaned every day, diet is less restrictive)  Safety/infection control  Kidney transplant  Types   or living donor  Eligibility  Transplant evaluation  Waiting list  Still MUST take medications including anti-rejection medications  PROs and CONs  The diet (least restrictive)  Education was provided according to best practices in cultural competence and cultural humility.  Outcome Assessment Questions  See scanned document below for outcome assessment.     Lesson 5 Getting Ready for Treatment (Beyond the Basics)  Lesson Objectives  By the end of each session, participants will be able to:  Express a benefit of having access surgery months before starting dialysis  Report two ways that the non-dominant arm should be protected for dialysis access  Topics & Points Covered  When does dialysis start?  Benefits of early access placement, including choice in type of dialysis (informed decision)  Hemodialysis access  Fistula, graft, central venous catheter (CVC)  PROS and CONS of access type  How to prepare for surgery and care for the access  "Fistula First" handout provided on various access types with pros/cons from ESRD NCC   Peritoneal dialysis access  Peritoneal dialysis catheter  How to prepare for surgery and care for the exit site  Transplant evaluation  Referral/paperwork for transplant center  Surgical referral  Immunizations, other lab tests  Emotional preparation/acceptance (depression)  Cultural considerations  Be sensitive to your audience's cultural beliefs and norms  Transportation considerations  Outcome Assessment Questions  See scanned document below for outcome assessment.     Lesson 6 Living with Advanced Kidney Disease  Lesson Objectives  By the end of each session, participants will be able " "to:  Identify that diet recommendations and medications may change when dialysis treatment begins  Identify that Medicare has a special program that may pay for the cost of treatment  Distinguish between palliative care and hospice care  Explain active medical management without dialysis  Describe the importance of creating an Advance Directive and Living Will  Topics & Points Covered  What to expect once you start dialysis  Will take time to feel better (won't happen after first treatment)  Medications may change  Basic diet changes for dialysis  Dietary needs are individualized  Dietitian is part of dialysis team  Financial concerns  Employment and rehabilitation  Cost of treatments  Cost of medications  Transportation   is part of dialysis team  Quality of life  Varies by person, communicate with care team  Transplantation  Waiting for transplant  If transplant fails  Advance Care Planning  Active medical management without dialysis  Hospice vs Palliative Care  Advance Directives and Living Will  Provided with [facility] "How to Start the Conversation about Advance Care Planning" booklet. Instructions provided on how to complete and submit paperwork.  Outcome Assessment Questions  See scanned document below for outcome assessment.     Outcome Assessment Scanned Document:             At the end of this session, the ESRD treatment modality the patient was most interested in was: Peritoneal dialysis. 2nd option: Home hemodialysis. The pt's spouse was unsure if they have enough room for storage of PD supplies. The pt's spouse liked that the pt could do dialysis more frequently with a home modality.    Areas of information that primary nephrology provider should reinforce during follow-up visit includes: LVAD and dialysis. The pt's spouse reported that she sometimes she is unable to get a blood pressure reading on the pt due to his LVAD. The pt's spouse would like more information on how this could affect " the pt dialyzing.    The content of these lessons are adapted from the Kidney Disease Education (KDE) Services benefit as defined by the Centers for Medicare & Medicaid Services.    90 minutes spent educating patient on the above content.

## 2024-05-27 ENCOUNTER — TELEPHONE (OUTPATIENT)
Dept: TRANSPLANT | Facility: CLINIC | Age: 64
End: 2024-05-27
Payer: MEDICAID

## 2024-05-27 ENCOUNTER — ANTI-COAG VISIT (OUTPATIENT)
Dept: CARDIOLOGY | Facility: CLINIC | Age: 64
End: 2024-05-27
Payer: MEDICAID

## 2024-05-27 ENCOUNTER — HOSPITAL ENCOUNTER (EMERGENCY)
Facility: HOSPITAL | Age: 64
Discharge: OTHER INSTITUTION | End: 2024-05-28
Attending: EMERGENCY MEDICINE
Payer: MEDICAID

## 2024-05-27 VITALS
HEART RATE: 85 BPM | BODY MASS INDEX: 23.5 KG/M2 | TEMPERATURE: 98 F | WEIGHT: 183 LBS | RESPIRATION RATE: 18 BRPM | DIASTOLIC BLOOD PRESSURE: 77 MMHG | OXYGEN SATURATION: 95 % | SYSTOLIC BLOOD PRESSURE: 126 MMHG

## 2024-05-27 DIAGNOSIS — N18.9 ANEMIA DUE TO CHRONIC KIDNEY DISEASE, UNSPECIFIED CKD STAGE: ICD-10-CM

## 2024-05-27 DIAGNOSIS — R29.898 WEAKNESS OF RIGHT LOWER EXTREMITY: Primary | ICD-10-CM

## 2024-05-27 DIAGNOSIS — M79.671 RIGHT FOOT PAIN: ICD-10-CM

## 2024-05-27 DIAGNOSIS — E87.6 HYPOKALEMIA: ICD-10-CM
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
## 2024-05-27 DIAGNOSIS — N18.9 CHRONIC KIDNEY DISEASE, UNSPECIFIED CKD STAGE: ICD-10-CM

## 2024-05-27 DIAGNOSIS — D63.1 ANEMIA DUE TO CHRONIC KIDNEY DISEASE, UNSPECIFIED CKD STAGE: ICD-10-CM

## 2024-05-27 DIAGNOSIS — Z95.811 LVAD (LEFT VENTRICULAR ASSIST DEVICE) PRESENT: Primary | ICD-10-CM

## 2024-05-27 DIAGNOSIS — Z95.811 LVAD (LEFT VENTRICULAR ASSIST DEVICE) PRESENT: ICD-10-CM

## 2024-05-27 DIAGNOSIS — R79.1 SUPRATHERAPEUTIC INR: ICD-10-CM

## 2024-05-27 LAB
ALBUMIN SERPL BCP-MCNC: 2.8 G/DL (ref 3.5–5.2)
ALP SERPL-CCNC: 112 U/L (ref 55–135)
ALT SERPL W/O P-5'-P-CCNC: 30 U/L (ref 10–44)
ANION GAP SERPL CALC-SCNC: 16 MMOL/L (ref 8–16)
APTT PPP: 51.8 SEC (ref 21–32)
AST SERPL-CCNC: 27 U/L (ref 10–40)
BASOPHILS # BLD AUTO: 0.05 K/UL (ref 0–0.2)
BASOPHILS NFR BLD: 0.9 % (ref 0–1.9)
BILIRUB SERPL-MCNC: 0.2 MG/DL (ref 0.1–1)
BUN SERPL-MCNC: 66 MG/DL (ref 8–23)
CALCIUM SERPL-MCNC: 9.1 MG/DL (ref 8.7–10.5)
CHLORIDE SERPL-SCNC: 97 MMOL/L (ref 95–110)
CO2 SERPL-SCNC: 29 MMOL/L (ref 23–29)
CREAT SERPL-MCNC: 6.6 MG/DL (ref 0.5–1.4)
DIFFERENTIAL METHOD BLD: ABNORMAL
EOSINOPHIL # BLD AUTO: 0.5 K/UL (ref 0–0.5)
EOSINOPHIL NFR BLD: 8.7 % (ref 0–8)
ERYTHROCYTE [DISTWIDTH] IN BLOOD BY AUTOMATED COUNT: 18.1 % (ref 11.5–14.5)
EST. GFR  (NO RACE VARIABLE): 9 ML/MIN/1.73 M^2
GLUCOSE SERPL-MCNC: 131 MG/DL (ref 70–110)
HCT VFR BLD AUTO: 21.5 % (ref 40–54)
HGB BLD-MCNC: 6.9 G/DL (ref 14–18)
IMM GRANULOCYTES # BLD AUTO: 0.02 K/UL (ref 0–0.04)
IMM GRANULOCYTES NFR BLD AUTO: 0.4 % (ref 0–0.5)
INR PPP: 4.2 (ref 0.8–1.2)
LYMPHOCYTES # BLD AUTO: 0.5 K/UL (ref 1–4.8)
LYMPHOCYTES NFR BLD: 10.2 % (ref 18–48)
MAGNESIUM SERPL-MCNC: 1.8 MG/DL (ref 1.6–2.6)
MCH RBC QN AUTO: 29.6 PG (ref 27–31)
MCHC RBC AUTO-ENTMCNC: 32.1 G/DL (ref 32–36)
MCV RBC AUTO: 92 FL (ref 82–98)
MONOCYTES # BLD AUTO: 0.5 K/UL (ref 0.3–1)
MONOCYTES NFR BLD: 9.6 % (ref 4–15)
NEUTROPHILS # BLD AUTO: 3.7 K/UL (ref 1.8–7.7)
NEUTROPHILS NFR BLD: 70.2 % (ref 38–73)
NRBC BLD-RTO: 0 /100 WBC
PHOSPHATE SERPL-MCNC: 3.7 MG/DL (ref 2.7–4.5)
PLATELET # BLD AUTO: 242 K/UL (ref 150–450)
PMV BLD AUTO: 9.8 FL (ref 9.2–12.9)
POTASSIUM SERPL-SCNC: 3.1 MMOL/L (ref 3.5–5.1)
PROT SERPL-MCNC: 8.1 G/DL (ref 6–8.4)
PROTHROMBIN TIME: 44 SEC (ref 9–12.5)
RBC # BLD AUTO: 2.33 M/UL (ref 4.6–6.2)
SODIUM SERPL-SCNC: 142 MMOL/L (ref 136–145)
WBC # BLD AUTO: 5.31 K/UL (ref 3.9–12.7)

## 2024-05-27 PROCEDURE — 85730 THROMBOPLASTIN TIME PARTIAL: CPT | Mod: 91 | Performed by: NURSE PRACTITIONER

## 2024-05-27 PROCEDURE — 93793 ANTICOAG MGMT PT WARFARIN: CPT | Mod: ,,,

## 2024-05-27 PROCEDURE — 85610 PROTHROMBIN TIME: CPT | Mod: 91 | Performed by: NURSE PRACTITIONER

## 2024-05-27 PROCEDURE — 83735 ASSAY OF MAGNESIUM: CPT | Performed by: NURSE PRACTITIONER

## 2024-05-27 PROCEDURE — 84100 ASSAY OF PHOSPHORUS: CPT | Performed by: NURSE PRACTITIONER

## 2024-05-27 PROCEDURE — 80053 COMPREHEN METABOLIC PANEL: CPT | Mod: 91 | Performed by: NURSE PRACTITIONER

## 2024-05-27 PROCEDURE — 99285 EMERGENCY DEPT VISIT HI MDM: CPT | Mod: 25

## 2024-05-27 PROCEDURE — 85025 COMPLETE CBC W/AUTO DIFF WBC: CPT | Mod: 91 | Performed by: NURSE PRACTITIONER

## 2024-05-27 NOTE — PROGRESS NOTES
[facility]    2024 3:02 PM    Assessment/Plan:    Patient presents today with supratherapeutic INR.    Assessment of patient findings and chart review: INR continues to trend up despite dose adjustments; reports right sided weakness which caused a fall today - will recommend ER and notify VAD team      Recommendation for patient's warfarin regimen: Hold dose today    Recommend repeat INR tomorrow  _________________________________________________________________    [facility] (64 y.o.) is followed by the [facility].    Anticoagulation Summary  As of 2024      INR goal:  2.0-3.0   TTR:  48.6% (3 mo)   INR used for dosin.3 (2024)   Warfarin maintenance plan:  2.5 mg (2.5 mg x 1) every day   Weekly warfarin total:  17.5 mg   Plan last modified:  Jesenia Jensen, PharmD (2024)   Next INR check:  2024   Target end date:      Indications    LVAD (left ventricular assist device) present [Z95.811]                 Anticoagulation Episode Summary       INR check location:      Preferred lab:      Send INR reminders to:  [facility] COUMADIN LVAD    Comments:  [facility]  ( [phone], fax [phone])  // [employer]          Anticoagulation Care Providers       Provider Role Specialty Phone number    Sajan Hurley MD [facility] [phone]

## 2024-05-27 NOTE — TELEPHONE ENCOUNTER
Attempting to contact patient regarding weekly labs. Coumadin clinic also reported right sided weakness and a fall. Left message for call back. On call VC Marilu Ma has been notified.

## 2024-05-27 NOTE — PROGRESS NOTES
Arlyn reports correct Warfarin dose, nausea 5/26/2024, nose bleed in his sleep 5/26/2024, weakness on right side 05/27/2024, fell due to the weakness today, Arlyn stated pt didn't injure himself when he fell, Arlyn stated she will call and report symptoms to VAD Team now, no other changes reported.

## 2024-05-27 NOTE — FIRST PROVIDER EVALUATION
Medical screening examination initiated.  I have conducted a focused provider triage encounter, findings are as follows:    Brief history of present illness:   patient is an LVAD patient.  Patient reports he has fallen twice due to increased right lower extremity weakness over the past couple of days.  Patient also reports he is on Coumadin and was instructed that his INR was elevated and to skip his dose today.    Vitals:    05/27/24 1606   Pulse: 85   Resp: 20   Temp: 98.4 °F (36.9 °C)   TempSrc: Oral   SpO2: 96%   Weight: 83 kg (183 lb)       Pertinent physical exam:    No acute distress    Brief workup plan:   labs, further eval    Preliminary workup initiated; this workup will be continued and followed by the physician or advanced practice provider that is assigned to the patient when roomed.

## 2024-05-28 ENCOUNTER — HOSPITAL ENCOUNTER (INPATIENT)
Facility: HOSPITAL | Age: 64
LOS: 3 days | Discharge: HOME | DRG: 073 | End: 2024-05-31
Attending: INTERNAL MEDICINE | Admitting: INTERNAL MEDICINE
Payer: MEDICAID

## 2024-05-28 ENCOUNTER — TELEPHONE (OUTPATIENT)
Dept: TRANSPLANT | Facility: CLINIC | Age: 64
End: 2024-05-28
Payer: MEDICAID

## 2024-05-28 DIAGNOSIS — R78.81 BACTEREMIA: ICD-10-CM

## 2024-05-28 DIAGNOSIS — I63.9 CVA (CEREBRAL VASCULAR ACCIDENT): ICD-10-CM

## 2024-05-28 DIAGNOSIS — R29.898 WEAKNESS OF FOOT, RIGHT: Primary | ICD-10-CM

## 2024-05-28 DIAGNOSIS — Z45.2 ENCOUNTER FOR REMOVAL OF TUNNELED CENTRAL VENOUS CATHETER (CVC) WITH PORT: ICD-10-CM

## 2024-05-28 DIAGNOSIS — D64.9 ANEMIA, UNSPECIFIED TYPE: ICD-10-CM

## 2024-05-28 DIAGNOSIS — Z95.811 LVAD (LEFT VENTRICULAR ASSIST DEVICE) PRESENT: ICD-10-CM

## 2024-05-28 DIAGNOSIS — Z95.811 LEFT VENTRICULAR ASSIST DEVICE PRESENT: ICD-10-CM

## 2024-05-28 DIAGNOSIS — I50.84 END STAGE HEART FAILURE: ICD-10-CM

## 2024-05-28 DIAGNOSIS — D63.1 ANEMIA DUE TO STAGE 5 CHRONIC KIDNEY DISEASE, NOT ON CHRONIC DIALYSIS: ICD-10-CM

## 2024-05-28 DIAGNOSIS — N18.5 ANEMIA DUE TO STAGE 5 CHRONIC KIDNEY DISEASE, NOT ON CHRONIC DIALYSIS: ICD-10-CM

## 2024-05-28 PROBLEM — G93.40 ACUTE ENCEPHALOPATHY: Status: RESOLVED | Noted: 2023-12-07 | Resolved: 2024-05-28

## 2024-05-28 PROBLEM — R93.89 ABNORMAL CXR: Status: RESOLVED | Noted: 2023-12-07 | Resolved: 2024-05-28

## 2024-05-28 PROBLEM — Z79.01 ANTICOAGULANT LONG-TERM USE: Status: ACTIVE | Noted: 2024-05-28

## 2024-05-28 LAB
ANION GAP SERPL CALC-SCNC: 13 MMOL/L (ref 8–16)
ANION GAP SERPL CALC-SCNC: 15 MMOL/L (ref 8–16)
ANION GAP SERPL CALC-SCNC: 19 MMOL/L (ref 8–16)
APTT PPP: 49.3 SEC (ref 21–32)
BASOPHILS # BLD AUTO: 0.04 K/UL (ref 0–0.2)
BASOPHILS NFR BLD: 0.7 % (ref 0–1.9)
BUN SERPL-MCNC: 59 MG/DL (ref 8–23)
BUN SERPL-MCNC: 63 MG/DL (ref 8–23)
BUN SERPL-MCNC: 65 MG/DL (ref 8–23)
CALCIUM SERPL-MCNC: 8.7 MG/DL (ref 8.7–10.5)
CALCIUM SERPL-MCNC: 8.8 MG/DL (ref 8.7–10.5)
CALCIUM SERPL-MCNC: 9.3 MG/DL (ref 8.7–10.5)
CHLORIDE SERPL-SCNC: 102 MMOL/L (ref 95–110)
CHLORIDE SERPL-SCNC: 98 MMOL/L (ref 95–110)
CHLORIDE SERPL-SCNC: 99 MMOL/L (ref 95–110)
CO2 SERPL-SCNC: 25 MMOL/L (ref 23–29)
CO2 SERPL-SCNC: 26 MMOL/L (ref 23–29)
CO2 SERPL-SCNC: 30 MMOL/L (ref 23–29)
CREAT SERPL-MCNC: 6 MG/DL (ref 0.5–1.4)
CREAT SERPL-MCNC: 6.3 MG/DL (ref 0.5–1.4)
CREAT SERPL-MCNC: 6.5 MG/DL (ref 0.5–1.4)
DIFFERENTIAL METHOD BLD: ABNORMAL
EOSINOPHIL # BLD AUTO: 0.5 K/UL (ref 0–0.5)
EOSINOPHIL NFR BLD: 8.6 % (ref 0–8)
ERYTHROCYTE [DISTWIDTH] IN BLOOD BY AUTOMATED COUNT: 18.6 % (ref 11.5–14.5)
ERYTHROCYTE [DISTWIDTH] IN BLOOD BY AUTOMATED COUNT: 18.6 % (ref 11.5–14.5)
EST. GFR  (NO RACE VARIABLE): 9 ML/MIN/1.73 M^2
EST. GFR  (NO RACE VARIABLE): 9.3 ML/MIN/1.73 M^2
EST. GFR  (NO RACE VARIABLE): 9.9 ML/MIN/1.73 M^2
GLUCOSE SERPL-MCNC: 134 MG/DL (ref 70–110)
GLUCOSE SERPL-MCNC: 210 MG/DL (ref 70–110)
GLUCOSE SERPL-MCNC: 222 MG/DL (ref 70–110)
HCT VFR BLD AUTO: 21.7 % (ref 40–54)
HCT VFR BLD AUTO: 22.8 % (ref 40–54)
HCYS SERPL-SCNC: 14.4 UMOL/L (ref 4–16.5)
HGB BLD-MCNC: 6.6 G/DL (ref 14–18)
HGB BLD-MCNC: 6.9 G/DL (ref 14–18)
IMM GRANULOCYTES # BLD AUTO: 0.04 K/UL (ref 0–0.04)
IMM GRANULOCYTES NFR BLD AUTO: 0.7 % (ref 0–0.5)
INR PPP: 3.4 (ref 0.8–1.2)
LDH SERPL L TO P-CCNC: 358 U/L (ref 110–260)
LYMPHOCYTES # BLD AUTO: 0.6 K/UL (ref 1–4.8)
LYMPHOCYTES NFR BLD: 10.6 % (ref 18–48)
MAGNESIUM SERPL-MCNC: 1.7 MG/DL (ref 1.6–2.6)
MAGNESIUM SERPL-MCNC: 1.9 MG/DL (ref 1.6–2.6)
MCH RBC QN AUTO: 28.8 PG (ref 27–31)
MCH RBC QN AUTO: 28.9 PG (ref 27–31)
MCHC RBC AUTO-ENTMCNC: 30.3 G/DL (ref 32–36)
MCHC RBC AUTO-ENTMCNC: 30.4 G/DL (ref 32–36)
MCV RBC AUTO: 95 FL (ref 82–98)
MCV RBC AUTO: 95 FL (ref 82–98)
MONOCYTES # BLD AUTO: 0.5 K/UL (ref 0.3–1)
MONOCYTES NFR BLD: 8.2 % (ref 4–15)
NEUTROPHILS # BLD AUTO: 3.9 K/UL (ref 1.8–7.7)
NEUTROPHILS NFR BLD: 71.2 % (ref 38–73)
NRBC BLD-RTO: 0 /100 WBC
PHOSPHATE SERPL-MCNC: 4.7 MG/DL (ref 2.7–4.5)
PLATELET # BLD AUTO: 237 K/UL (ref 150–450)
PLATELET # BLD AUTO: 260 K/UL (ref 150–450)
PMV BLD AUTO: 10.3 FL (ref 9.2–12.9)
PMV BLD AUTO: 9.7 FL (ref 9.2–12.9)
POTASSIUM SERPL-SCNC: 3.1 MMOL/L (ref 3.5–5.1)
POTASSIUM SERPL-SCNC: 3.9 MMOL/L (ref 3.5–5.1)
POTASSIUM SERPL-SCNC: 4.2 MMOL/L (ref 3.5–5.1)
PROTHROMBIN TIME: 34.6 SEC (ref 9–12.5)
RBC # BLD AUTO: 2.29 M/UL (ref 4.6–6.2)
RBC # BLD AUTO: 2.39 M/UL (ref 4.6–6.2)
SODIUM SERPL-SCNC: 140 MMOL/L (ref 136–145)
SODIUM SERPL-SCNC: 141 MMOL/L (ref 136–145)
SODIUM SERPL-SCNC: 146 MMOL/L (ref 136–145)
WBC # BLD AUTO: 5.44 K/UL (ref 3.9–12.7)
WBC # BLD AUTO: 5.46 K/UL (ref 3.9–12.7)

## 2024-05-28 PROCEDURE — 99223 1ST HOSP IP/OBS HIGH 75: CPT | Mod: ,,, | Performed by: PSYCHIATRY & NEUROLOGY

## 2024-05-28 PROCEDURE — 97161 PT EVAL LOW COMPLEX 20 MIN: CPT

## 2024-05-28 PROCEDURE — 83090 ASSAY OF HOMOCYSTEINE: CPT | Performed by: INTERNAL MEDICINE

## 2024-05-28 PROCEDURE — 85025 COMPLETE CBC W/AUTO DIFF WBC: CPT | Performed by: INTERNAL MEDICINE

## 2024-05-28 PROCEDURE — 97116 GAIT TRAINING THERAPY: CPT

## 2024-05-28 PROCEDURE — 84165 PROTEIN E-PHORESIS SERUM: CPT | Mod: 26,,, | Performed by: PATHOLOGY

## 2024-05-28 PROCEDURE — 25000003 PHARM REV CODE 250: Performed by: REGISTERED NURSE

## 2024-05-28 PROCEDURE — 86334 IMMUNOFIX E-PHORESIS SERUM: CPT | Mod: 26,,, | Performed by: PATHOLOGY

## 2024-05-28 PROCEDURE — 63600175 PHARM REV CODE 636 W HCPCS: Performed by: INTERNAL MEDICINE

## 2024-05-28 PROCEDURE — 83735 ASSAY OF MAGNESIUM: CPT | Performed by: REGISTERED NURSE

## 2024-05-28 PROCEDURE — 80048 BASIC METABOLIC PNL TOTAL CA: CPT | Mod: 91 | Performed by: REGISTERED NURSE

## 2024-05-28 PROCEDURE — 85610 PROTHROMBIN TIME: CPT | Performed by: INTERNAL MEDICINE

## 2024-05-28 PROCEDURE — 97530 THERAPEUTIC ACTIVITIES: CPT

## 2024-05-28 PROCEDURE — 84100 ASSAY OF PHOSPHORUS: CPT | Performed by: INTERNAL MEDICINE

## 2024-05-28 PROCEDURE — 97165 OT EVAL LOW COMPLEX 30 MIN: CPT

## 2024-05-28 PROCEDURE — 25000003 PHARM REV CODE 250: Performed by: INTERNAL MEDICINE

## 2024-05-28 PROCEDURE — 82746 ASSAY OF FOLIC ACID SERUM: CPT

## 2024-05-28 PROCEDURE — 94761 N-INVAS EAR/PLS OXIMETRY MLT: CPT

## 2024-05-28 PROCEDURE — 83615 LACTATE (LD) (LDH) ENZYME: CPT | Performed by: INTERNAL MEDICINE

## 2024-05-28 PROCEDURE — 84207 ASSAY OF VITAMIN B-6: CPT | Performed by: INTERNAL MEDICINE

## 2024-05-28 PROCEDURE — 93750 INTERROGATION VAD IN PERSON: CPT | Mod: ,,, | Performed by: INTERNAL MEDICINE

## 2024-05-28 PROCEDURE — 86592 SYPHILIS TEST NON-TREP QUAL: CPT | Performed by: INTERNAL MEDICINE

## 2024-05-28 PROCEDURE — 82607 VITAMIN B-12: CPT | Performed by: INTERNAL MEDICINE

## 2024-05-28 PROCEDURE — 27000207 HC ISOLATION

## 2024-05-28 PROCEDURE — 36415 COLL VENOUS BLD VENIPUNCTURE: CPT | Performed by: INTERNAL MEDICINE

## 2024-05-28 PROCEDURE — 85027 COMPLETE CBC AUTOMATED: CPT | Performed by: INTERNAL MEDICINE

## 2024-05-28 PROCEDURE — 80048 BASIC METABOLIC PNL TOTAL CA: CPT | Performed by: INTERNAL MEDICINE

## 2024-05-28 PROCEDURE — 20600001 HC STEP DOWN PRIVATE ROOM

## 2024-05-28 PROCEDURE — 84425 ASSAY OF VITAMIN B-1: CPT | Performed by: INTERNAL MEDICINE

## 2024-05-28 PROCEDURE — 84165 PROTEIN E-PHORESIS SERUM: CPT | Performed by: INTERNAL MEDICINE

## 2024-05-28 PROCEDURE — 83735 ASSAY OF MAGNESIUM: CPT | Mod: 91 | Performed by: INTERNAL MEDICINE

## 2024-05-28 PROCEDURE — 80048 BASIC METABOLIC PNL TOTAL CA: CPT | Mod: 91 | Performed by: INTERNAL MEDICINE

## 2024-05-28 PROCEDURE — 86334 IMMUNOFIX E-PHORESIS SERUM: CPT | Performed by: INTERNAL MEDICINE

## 2024-05-28 PROCEDURE — 83921 ORGANIC ACID SINGLE QUANT: CPT | Performed by: INTERNAL MEDICINE

## 2024-05-28 PROCEDURE — 85730 THROMBOPLASTIN TIME PARTIAL: CPT | Performed by: INTERNAL MEDICINE

## 2024-05-28 PROCEDURE — 27000248 HC VAD-ADDITIONAL DAY

## 2024-05-28 RX ORDER — FLUTICASONE PROPIONATE 50 MCG
2 SPRAY, SUSPENSION (ML) NASAL DAILY
Status: DISCONTINUED | OUTPATIENT
Start: 2024-05-28 | End: 2024-05-31 | Stop reason: HOSPADM

## 2024-05-28 RX ORDER — POTASSIUM CHLORIDE 20 MEQ/1
40 TABLET, EXTENDED RELEASE ORAL
Status: COMPLETED | OUTPATIENT
Start: 2024-05-28 | End: 2024-05-28

## 2024-05-28 RX ORDER — BUMETANIDE 0.25 MG/ML
2 INJECTION INTRAMUSCULAR; INTRAVENOUS ONCE
Status: DISCONTINUED | OUTPATIENT
Start: 2024-05-28 | End: 2024-05-28

## 2024-05-28 RX ORDER — VENLAFAXINE 37.5 MG/1
37.5 TABLET ORAL DAILY
Status: DISCONTINUED | OUTPATIENT
Start: 2024-05-28 | End: 2024-05-31 | Stop reason: HOSPADM

## 2024-05-28 RX ORDER — AMOXICILLIN 250 MG
2 CAPSULE ORAL DAILY PRN
Status: DISCONTINUED | OUTPATIENT
Start: 2024-05-28 | End: 2024-05-31 | Stop reason: HOSPADM

## 2024-05-28 RX ORDER — CHOLECALCIFEROL (VITAMIN D3) 25 MCG
1000 TABLET ORAL DAILY
Status: DISCONTINUED | OUTPATIENT
Start: 2024-05-28 | End: 2024-05-31 | Stop reason: HOSPADM

## 2024-05-28 RX ORDER — HYDRALAZINE HYDROCHLORIDE 50 MG/1
100 TABLET, FILM COATED ORAL EVERY 8 HOURS
Status: DISCONTINUED | OUTPATIENT
Start: 2024-05-28 | End: 2024-05-31 | Stop reason: HOSPADM

## 2024-05-28 RX ORDER — OMEGA-3-ACID ETHYL ESTERS 1 G/1
2 CAPSULE, LIQUID FILLED ORAL 2 TIMES DAILY
Status: DISCONTINUED | OUTPATIENT
Start: 2024-05-28 | End: 2024-05-31 | Stop reason: HOSPADM

## 2024-05-28 RX ORDER — HYDRALAZINE HYDROCHLORIDE 50 MG/1
100 TABLET, FILM COATED ORAL EVERY 8 HOURS
Status: DISCONTINUED | OUTPATIENT
Start: 2024-05-28 | End: 2024-05-28

## 2024-05-28 RX ORDER — AMIODARONE HYDROCHLORIDE 200 MG/1
400 TABLET ORAL DAILY
Status: DISCONTINUED | OUTPATIENT
Start: 2024-05-28 | End: 2024-05-31 | Stop reason: HOSPADM

## 2024-05-28 RX ORDER — LANOLIN ALCOHOL/MO/W.PET/CERES
400 CREAM (GRAM) TOPICAL DAILY
Status: DISCONTINUED | OUTPATIENT
Start: 2024-05-28 | End: 2024-05-31 | Stop reason: HOSPADM

## 2024-05-28 RX ORDER — BUMETANIDE 0.25 MG/ML
2 INJECTION INTRAMUSCULAR; INTRAVENOUS 2 TIMES DAILY
Status: DISCONTINUED | OUTPATIENT
Start: 2024-05-28 | End: 2024-05-31

## 2024-05-28 RX ORDER — HYDRALAZINE HYDROCHLORIDE 20 MG/ML
10 INJECTION INTRAMUSCULAR; INTRAVENOUS ONCE
Status: COMPLETED | OUTPATIENT
Start: 2024-05-28 | End: 2024-05-28

## 2024-05-28 RX ORDER — FAMOTIDINE 20 MG/1
20 TABLET, FILM COATED ORAL DAILY
Status: DISCONTINUED | OUTPATIENT
Start: 2024-05-28 | End: 2024-05-31 | Stop reason: HOSPADM

## 2024-05-28 RX ORDER — ATORVASTATIN CALCIUM 20 MG/1
40 TABLET, FILM COATED ORAL NIGHTLY
Status: DISCONTINUED | OUTPATIENT
Start: 2024-05-28 | End: 2024-05-31 | Stop reason: HOSPADM

## 2024-05-28 RX ORDER — GABAPENTIN 100 MG/1
100 CAPSULE ORAL 2 TIMES DAILY
Status: DISCONTINUED | OUTPATIENT
Start: 2024-05-28 | End: 2024-05-31 | Stop reason: HOSPADM

## 2024-05-28 RX ORDER — METOLAZONE 5 MG/1
10 TABLET ORAL ONCE
Status: COMPLETED | OUTPATIENT
Start: 2024-05-28 | End: 2024-05-28

## 2024-05-28 RX ADMIN — POTASSIUM CHLORIDE 40 MEQ: 1500 TABLET, EXTENDED RELEASE ORAL at 04:05

## 2024-05-28 RX ADMIN — AMPICILLIN 2 G: 2 INJECTION, POWDER, FOR SOLUTION INTRAMUSCULAR; INTRAVENOUS at 09:05

## 2024-05-28 RX ADMIN — POTASSIUM CHLORIDE 40 MEQ: 1500 TABLET, EXTENDED RELEASE ORAL at 02:05

## 2024-05-28 RX ADMIN — CHOLECALCIFEROL TAB 25 MCG (1000 UNIT) 1000 UNITS: 25 TAB at 09:05

## 2024-05-28 RX ADMIN — CEFTRIAXONE 2 G: 2 INJECTION, POWDER, FOR SOLUTION INTRAMUSCULAR; INTRAVENOUS at 09:05

## 2024-05-28 RX ADMIN — AMPICILLIN 2 G: 2 INJECTION, POWDER, FOR SOLUTION INTRAMUSCULAR; INTRAVENOUS at 10:05

## 2024-05-28 RX ADMIN — POTASSIUM CHLORIDE 40 MEQ: 1500 TABLET, EXTENDED RELEASE ORAL at 07:05

## 2024-05-28 RX ADMIN — TRAZODONE HYDROCHLORIDE 25 MG: 50 TABLET ORAL at 09:05

## 2024-05-28 RX ADMIN — HYDRALAZINE HYDROCHLORIDE 10 MG: 20 INJECTION, SOLUTION INTRAMUSCULAR; INTRAVENOUS at 01:05

## 2024-05-28 RX ADMIN — AMIODARONE HYDROCHLORIDE 400 MG: 200 TABLET ORAL at 09:05

## 2024-05-28 RX ADMIN — ATORVASTATIN CALCIUM 40 MG: 20 TABLET, FILM COATED ORAL at 09:05

## 2024-05-28 RX ADMIN — OMEGA-3-ACID ETHYL ESTERS 2 G: 1 CAPSULE, LIQUID FILLED ORAL at 09:05

## 2024-05-28 RX ADMIN — Medication 400 MG: at 09:05

## 2024-05-28 RX ADMIN — FAMOTIDINE 20 MG: 20 TABLET ORAL at 09:05

## 2024-05-28 RX ADMIN — TRAZODONE HYDROCHLORIDE 25 MG: 50 TABLET ORAL at 02:05

## 2024-05-28 RX ADMIN — METOLAZONE 10 MG: 5 TABLET ORAL at 05:05

## 2024-05-28 RX ADMIN — HYDRALAZINE HYDROCHLORIDE 100 MG: 50 TABLET ORAL at 02:05

## 2024-05-28 RX ADMIN — BUMETANIDE 2 MG: 0.25 INJECTION INTRAMUSCULAR; INTRAVENOUS at 04:05

## 2024-05-28 RX ADMIN — HYDRALAZINE HYDROCHLORIDE 100 MG: 50 TABLET ORAL at 03:05

## 2024-05-28 RX ADMIN — GABAPENTIN 100 MG: 100 CAPSULE ORAL at 09:05

## 2024-05-28 RX ADMIN — HYDRALAZINE HYDROCHLORIDE 100 MG: 50 TABLET ORAL at 09:05

## 2024-05-28 RX ADMIN — VENLAFAXINE 37.5 MG: 37.5 TABLET ORAL at 09:05

## 2024-05-28 RX ADMIN — BUMETANIDE 2 MG: 0.25 INJECTION INTRAMUSCULAR; INTRAVENOUS at 09:05

## 2024-05-28 RX ADMIN — CEFTRIAXONE 2 G: 2 INJECTION, POWDER, FOR SOLUTION INTRAMUSCULAR; INTRAVENOUS at 10:05

## 2024-05-28 RX ADMIN — POTASSIUM CHLORIDE 40 MEQ: 1500 TABLET, EXTENDED RELEASE ORAL at 10:05

## 2024-05-28 NOTE — PT/OT/SLP EVAL
Physical Therapy Co-Evaluation and Co-Treatment    Patient Name:  Radha Abbott   MRN:  [id number]  Admit Date: 5/28/2024  Admitting Diagnosis:  Weakness of foot, right   Length of Stay: 0 days  Recent Surgery: * No surgery found *      Recommendations:     Discharge Recommendations:  Low Intensity Therapy     Discharge Equipment Recommendations: walker, rolling   Barriers to discharge: None    Appropriate transfer level with nursing staff: Ambulatory in room with RW with CGA    Plan:     During this hospitalization, patient to be seen 3 x/week to address the identified rehab impairments via therapeutic activities, gait training, therapeutic exercises, neuromuscular re-education and progress towards the established goals.  Plan of Care Expires:  06/28/24  Plan of Care Reviewed with: patient, spouse    Assessment:     Radha Abbott is a 64 y.o. male admitted with a medical diagnosis of Weakness of foot, right. Pt found supine agreeable to therapy session. Pt reports onset of R sided weakness ~3 days ago resulting in x2 falls at home. Upon examination, pt with no active DF of R ankle and weak plantarflexion of R ankle as well as decreased sensation on R foot. Pt with reports of pain on lateral aspect of R foot. During gait trial, no circumduction noted but increased R hip and knee flexion during swing phase for clearance of R foot. Pt slow during trial but no LOB. Patient would benefit from trial of gait training with AFO to assist with clearance of R foot and decrease fall risk. Patient would benefit from skilled therapy services to maximize safety and independence, increase activity tolerance, decrease fall risk, decrease caregiver burden, improve QOL, improve patient's functional mobility, and decrease risk of contractures and pressure sores. Patient currently demonstrates a need for low intensity therapy on a scheduled basis secondary to a decline in functional status due to injury    Patient demonstrates a mobility  limitation that significantly impairs their ability to participate in one or more mobility related activities of daily living. Patient's mobility limitation cannot be sufficiently resolved with the use of a cane, but can be sufficiently resolved with the use of a rolling walker.The use of a rolling walker will considerably improve their ability to participate in MRADLs. Patient will use the walker on a regular basis at home.       Problem List: weakness, impaired endurance, impaired self care skills, impaired functional mobility, gait instability, impaired balance, decreased lower extremity function, decreased upper extremity function, pain, decreased ROM, edema.  Rehab Prognosis: Good; patient would benefit from acute skilled PT services to address these deficits and reach maximum level of function.      Goals:   Multidisciplinary Problems       Physical Therapy Goals          Problem: Physical Therapy    Goal Priority Disciplines Outcome Goal Variances Interventions   Physical Therapy Goal     PT, PT/OT Progressing     Description: Goals to be met by: 24     Patient will increase functional independence with mobility by performin. Supine to sit with [place]  2. Sit to stand transfer with Modified [place]  3. Bed to chair transfer with Modified [place] using LRAD  4. Gait  x 200 feet with Supervision using LRAD.   5. Stand for 10 minutes with [place] using LRAD  6. Lower extremity exercise program x15 reps per handout, with independence                         Subjective     RN notified prior to session. Wife present upon PT entrance into room. Patient agreeable to PT evaluation.    Chief Complaint: R foot pain that increases with WB  Patient/Family Comments/goals: return to PLOF and get rid of R foot pain  Pain/Comfort:  Pain Rating 1: 7/10  Location - Side 1: Right  Location - Orientation 1: lateral  Location 1: foot  Pain Addressed 1: Reposition, Distraction  Pain Rating  Post-Intervention 1: 7/10    Social History:  Residence: lives with their spouse 1-story house with threshold JACKIE. Pt's bathroom has a WIS with a built in shower chair and grab bars.  Support available: family  Equipment Owned (using): rollator, bedside commode, shower chair, grab bar  Prior level of function: pt reports mod Ind with rollator use PRN. Pt reports Ind with ADLs. Over past weak, pt has noted increased fatigue.       Patient reports they will have assistance from family upon discharge.    Objective:     Additional staff present: OT; OT for co-evaluation due to suspected patient need for two skilled therapists to appropriately assess patient's functional deficits as well as ensure patient safety, accommodate for limited activity tolerance, and provide appropriate, skilled assistance to maximize functional potential during evaluation.    Patient found supine with: LVAD, telemetry     General Precautions: Standard, Cardiac fall, LVAD, contact   Orthopedic Precautions:N/A   Braces: N/A   Body mass index is 23.89 kg/m².  Oxygen Device: Room Air  Vitals: BP (!) 106/0 (BP Location: Right arm, Patient Position: Sitting)   Pulse 89   Temp 98.2 °F (36.8 °C) (Oral)   Resp 18   Wt 84.4 kg (186 lb 1.1 oz)   SpO2 96%   BMI 23.89 kg/m²       Exams:    Cognition:  Oriented X 4, Alert, and Cooperative  Command following: Follows multistep verbal commands  Communication: clear/fluent    Sensation:   Light touch sensation: R lateral foot and toes absent sensation, rest of R foot impaired sensation, normal sensation of RLE from ankle proximally to hip; LLE intact sensation    Gross Motor Coordination: No deficits noted during functional mobility tasks     Edema/Skin Integrity: Mild swelling noted to Lateral aspect of R foot; Visible skin intact    Postural examination/scapula alignment: Rounded shoulder and Head forward    Lower Extremity Range of Motion:  Right Lower Extremity: WFL except no active ankle DF ROM,  passively ankle DF decreased compared to L ankle  Left Lower Extremity: WFL    Lower Extremity Strength:  Right Lower Extremity: WFL except hip flexors 4-/5, ankle PF 25, ankle DF 0/5  Left Lower Extremity: WFL except hip flexors 4+/5      Functional Mobility:    Bed Mobility:  Scooting with independence  Supine > Sit with independence    Transfers:   Sit <> Stand Transfer: Stand-by Assistance x 1 trials from EOB with RW (slow ascent noted)               Gait:  Distance: 10' in room  Assistance level: Contact Guard Assistance  Assistive Device: rolling walker  Gait Deviation(s): occasional unsteady gait, decreased step length, decreased weight shift, decreased foot clearance, flexed posture, decreased gina, and inconsistent right foot placement  all lines remained intact throughout ambulation trial  LVAD: pt on wall power throughout session  Comments: Pt slow with decreased clearance of R foot during trial. Pt with increased hip and knee flexion throughout stance phase of gait to increase R foot clearance.     Balance:  Sitting Balance  Static: modified independence  Dynamic: stand by assistance  Standing:  Static: stand by assistance  Dynamic: contact guard assistance    Outcome Measures:  AM-PAC 6 CLICK MOBILITY  Turning over in bed (including adjusting bedclothes, sheets and blankets)?: 4  Sitting down on and standing up from a chair with arms (e.g., wheelchair, bedside commode, etc.): 3  Moving from lying on back to sitting on the side of the bed?: 4  Moving to and from a bed to a chair (including a wheelchair)?: 3  Need to walk in hospital room?: 3  Climbing 3-5 steps with a railing?: 2  Basic Mobility Total Score: 19     Education:  Time provided for education, counseling and discussion of health disposition in regards to patient's current status  All questions answered within PT scope of practice and to patient's satisfaction  PT role in POC to address current functional deficits  LVAD: patient found on  wall power / returned on wall power. no alarms sounded.   Pt and wife educated on pt mobilizing with RW and family/staff assist around room    Patient left up in chair with all lines intact, call button in reach, and wife present.      History:     Past Medical History:   Diagnosis Date    CAD (coronary artery disease)     CHF (congestive heart failure)     Diabetes mellitus     HFrEF (heart failure with reduced ejection fraction)     ICD (implantable cardioverter-defibrillator) in place     MI, old        Past Surgical History:   Procedure Laterality Date    ANGIOPLASTY-VENOUS ARTERY Right 12/1/2023    Procedure: ANGIOPLASTY-VENOUS ARTERY, RIGHT FEMORAL;  Surgeon: Yuri Washington MD;  Location: [address];  Service: Cardiovascular;  Laterality: Right;    AORTIC VALVULOPLASTY N/A 12/1/2023    Procedure: REPAIR, AORTIC VALVE;  Surgeon: Yuri Washington MD;  [facility];  Service: Cardiovascular;  Laterality: N/A;    CARDIAC SURGERY      CLOSURE N/A 12/1/2023    Procedure: CLOSURE, TEMPORARY;  Surgeon: Yuri Washington MD;  [address];  Service: Cardiovascular;  Laterality: N/A;    DRAINAGE OF PLEURAL EFFUSION  12/4/2023    Procedure: DRAINAGE, PLEURAL EFFUSION;  Surgeon: Yuri Washington MD;  Location: [facility] OR [address] [place] [address];  Service: Cardiovascular;;    INSERTION OF GRAFT TO PERICARDIUM  12/4/2023    Procedure: INSERTION, GRAFT, PERICARDIUM;  Surgeon: Yuri Washington MD;  Location: [facility] OR [address] [place] [address];  Service: Cardiovascular;;    INSERTION OF INTRA-AORTIC BALLOON ASSIST DEVICE Right 11/21/2023    Procedure: INSERTION, INTRA-AORTIC BALLOON PUMP;  Surgeon: Finn Cohn MD;  Location: [facility] CATH LAB;  Service: Cardiology;  Laterality: Right;    LEFT VENTRICULAR ASSIST DEVICE Left 12/1/2023    Procedure: INSERTION-LEFT VENTRICULAR ASSIST DEVICE;  Surgeon: Yuri Washington MD;  Location: [address];  Service: Cardiovascular;  Laterality: Left;  REDO STERNOTOMY - REDO SAW NEEDED FOR CASE     LYSIS OF ADHESIONS  12/1/2023    Procedure: LYSIS, ADHESIONS;  Surgeon: Yuri Washington MD;  [facility];  Service: Cardiovascular;;    PLACEMENT OF SWAN ROLANDO CATHETER WITH IMAGING GUIDANCE  11/20/2023    Procedure: INSERTION, CATHETER, SWAN-ROLANDO, WITH IMAGING GUIDANCE;  Surgeon: Sajan Hurley MD;  Location: [facility] CATH LAB;  Service: Cardiology;;    REMOVAL OF TUNNELED CENTRAL VENOUS CATHETER (CVC) N/A 3/1/2024    Procedure: REMOVAL, CATHETER, CENTRAL VENOUS, TUNNELED;  Surgeon: Seble Aguilar MD;  Location: [facility] CATH LAB;  Service: Interventional Nephrology;  Laterality: N/A;    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 12/1/2023    Procedure: REPAIR, ANEURYSM, ARTERY, FEMORAL;  Surgeon: Yuri Washington MD;  [facility];  Service: Cardiovascular;  Laterality: Right;  Right Femoral Artery Repair    RIGHT HEART CATHETERIZATION Right 10/10/2023    Procedure: INSERTION, CATHETER, RIGHT HEART;  Surgeon: Bin Gandhi MD;  Location: [facility];  Service: Cardiology;  Laterality: Right;    RIGHT HEART CATHETERIZATION Right 10/13/2023    Procedure: INSERTION, CATHETER, RIGHT HEART;  Surgeon: Walter Mcintyre MD;  Location: [facility] CATH LAB;  Service: Cardiology;  Laterality: Right;    RIGHT HEART CATHETERIZATION  11/13/2023    RIGHT HEART CATHETERIZATION Right 11/13/2023    Procedure: INSERTION, CATHETER, RIGHT HEART;  Surgeon: Juventino Bermudez Jr., MD;  Location: [facility] CATH LAB;  Service: Cardiology;  Laterality: Right;    RIGHT HEART CATHETERIZATION Right 11/20/2023    Procedure: INSERTION, CATHETER, RIGHT HEART;  Surgeon: Sajan Hurley MD;  Location: [facility] CATH LAB;  Service: Cardiology;  Laterality: Right;    RIGHT HEART CATHETERIZATION Right 1/22/2024    Procedure: INSERTION, CATHETER, RIGHT HEART;  Surgeon: Brayan Ocampo MD;  Location: [facility] CATH LAB;  Service: Cardiology;  Laterality: Right;    STERNAL WOUND CLOSURE N/A 12/4/2023    Procedure: CLOSURE, WOUND,  STERNUM;  Surgeon: Yuri Washington MD;  Location: [facility] OR [address] [place] [address];  Service: Cardiovascular;  Laterality: N/A;    STERNOTOMY N/A 12/1/2023    Procedure: STERNOTOMY, REDO;  Surgeon: Yuri Washington MD;  [facility];  Service: Cardiovascular;  Laterality: N/A;    VALVULOPLASTY, MITRAL VALVE N/A 12/1/2023    Procedure: VALVULOPLASTY, MITRAL VALVE;  Surgeon: Yuri Washington MD;  [facility];  Service: Cardiovascular;  Laterality: N/A;       No family history on file.    Social History     Socioeconomic History    Marital status:    Tobacco Use    Smoking status: Former     Current packs/day: 0.50     Types: Cigarettes   Substance and Sexual Activity    Alcohol use: Yes     Comment: rarely    Drug use: No    Sexual activity: Not Currently     Partners: Female     Social Determinants of Health     Financial Resource Strain: High Risk (3/26/2024)    Overall Financial Resource Strain (CARDIA)     Difficulty of Paying Living Expenses: Hard   Food Insecurity: Food Insecurity Present (3/26/2024)    Hunger Vital Sign     Worried About Running Out of Food in the Last Year: Sometimes true     Ran Out of Food in the Last Year: Often true   Transportation Needs: No Transportation Needs (3/26/2024)    PRAPARE - Transportation     Lack of Transportation (Medical): No     Lack of Transportation (Non-Medical): No   Physical Activity: Inactive (2/29/2024)    Exercise Vital Sign     Days of Exercise per Week: 2 days     Minutes of Exercise per Session: 0 min   Stress: Stress Concern Present (3/26/2024)    [employer] - Occupational Stress Questionnaire     Feeling of Stress : To some extent   Housing Stability: High Risk (3/26/2024)    Housing Stability Vital Sign     Unable to Pay for Housing in the Last Year: Yes     Number of Places Lived in the Last Year: 1     Unstable Housing in the Last Year: No       Time Tracking:     PT Received On: 05/28/24  PT Start Time: 0904      PT Stop Time: 0930  PT Total Time (min): 26 min     Billable Minutes: Evaluation 16 minutes and Gait Training 10 minutes    5/28/2024

## 2024-05-28 NOTE — ASSESSMENT & PLAN NOTE
R foot motor and sensory weakness x 2 days  2 mechanical falls  CT head and xray foot unremarkable  Vascular neurology consulted

## 2024-05-28 NOTE — CONSULTS
[facility] - Cardiology Stepdown  Vascular Neurology  [facility]  Consult Note    Inpatient consult to Vascular (Stroke) Neurology  Consult performed by: Ijeoma Mcgee NP  Consult ordered by: Omid Manzanares MD  Reason for consult: right foot pain, weakness and numbness        Assessment/Plan:     Patient is a 64 y.o. year old male with:    * Weakness of foot, right  65 y/o male with c/o right foot pain, weakness and numbness for 2-3 days that has resulted in him falling x 2. CT scan head with no acute process seen. Out of window for thrombolytics plus on coumadin that is supra therapeutic.  NO deficits to suggest LVO so no intervention and no vessel imaging due to IVÁN.   LDL 77.8 not on statin  On coumadin and supra therapeutic  Recommend therapy with PT/OT  Has neuropathy and is on gabapentin  Unable to get MRI brain.  Further recommendation once evaluated by Vascular neurologist.         LVAD (left ventricular assist device) present  Stroke risk factor  Per cardiology    Acute on chronic combined systolic and diastolic heart failure  Stroke risk factor  Per cardiology  Has LVAD    Bacteremia  Stroke risk factor  Continue IV ABX    Type 2 diabetes mellitus without complication, without long-term current use of insulin  Stroke risk factor  HgB A1C  Normoglycemic  SSI    Anticoagulant long-term use  On coumadin due to LVAD        STROKE DOCUMENTATION          NIH Scale:  1a. Level of Consciousness: 0-->Alert, keenly responsive  1b. LOC Questions: 0-->Answers both questions correctly  1c. LOC Commands: 0-->Performs both tasks correctly  2. Best Gaze: 0-->Normal  3. Visual: 0-->No visual loss  4. Facial Palsy: 0-->Normal symmetrical movements  5a. Motor Arm, Left: 0-->No drift, limb holds 90 (or 45) degrees for full 10 secs  5b. Motor Arm, Right: 0-->No drift, limb holds 90 (or 45) degrees for full 10 secs  6a. Motor Leg, Left: 0-->No drift, leg holds 30 degree position for full 5  secs  6b. Motor Leg, Right: 1-->Drift, leg falls by the end of the 5-sec period but does not hit bed  7. Limb Ataxia: 0-->Absent  8. Sensory: 1-->Mild-to-moderate sensory loss, patient feels pinprick is less sharp or is dull on the affected side, or there is a loss of superficial pain with pinprick, but patient is aware of being touched  9. Best Language: 0-->No aphasia, normal  10. Dysarthria: 0-->Normal  11. Extinction and Inattention (formerly Neglect): 0-->No abnormality  Total (NIH Stroke Scale): 2    Modified Amite Score: 3  [place] Coma Scale:15   ABCD2 Score:    [id number] Score:   HAS -BLED Score:   ICH Score:   Hunt & Ward Classification:       Thrombolysis Candidate? No, Out of window - Symptom onset > 4.5 hours    Delays to Thrombolysis?  Not Applicable    Interventional Revascularization Candidate?   Is the patient eligible for mechanical endovascular reperfusion (CHARLES)?  No; at this time symptoms not suggestive of large vessel occlusion and No; significant pre-stroke disability     Delays to Thrombectomy? Not Applicable    Hemorrhagic change of an Ischemic Stroke: Does this patient have an ischemic stroke with hemorrhagic changes? No     Subjective:     History of Present Illness:  65 y/o LVAD patient presented to the ED at [facility] for 2-3 day history of right foot weakness and numbness and pain. He had 2 falls since this started. Wife also reports intermittent slurred speech and confusion. NIH stroke scale 2, out of the thrombolytic window also not a candidate due to an INR 4.2.   CT head with no acute process seen.   Patient transferred to [facility] for LVAD and closer monitoring and evaluation    Patient was recently  admitted to the hospital with a 4 day history of fatigue and fevers on 4-17-24 till 5-1-24. He denied any other symptoms. COVID test was positive.  Blood cultures were positive for E faecalis.  Source of his bacteremia was unclear.  LVAD DLES was clean and  dry.  TTE was negative for vegetation.  He was started on IV ampicillin and IV ceftriaxone for 6 weeks empirically.  He cleared his blood cultures.  A tunneled line was placed in his right upper chest and he was discharged home.     Patient is unable to get MRI due to LVAD.    Further recommendation once evaluated by Vascular neurologist.     Off note patient had neuropathic pain right foot treated with gabapentin 12-23-23            Past Medical History:   Diagnosis Date    CAD (coronary artery disease)     CHF (congestive heart failure)     Diabetes mellitus     HFrEF (heart failure with reduced ejection fraction)     ICD (implantable cardioverter-defibrillator) in place     MI, old      Past Surgical History:   Procedure Laterality Date    ANGIOPLASTY-VENOUS ARTERY Right 12/1/2023    Procedure: ANGIOPLASTY-VENOUS ARTERY, RIGHT FEMORAL;  Surgeon: Yuri Washington MD;  [facility];  Service: Cardiovascular;  Laterality: Right;    AORTIC VALVULOPLASTY N/A 12/1/2023    Procedure: REPAIR, AORTIC VALVE;  Surgeon: Yuri Washington MD;  [facility];  Service: Cardiovascular;  Laterality: N/A;    CARDIAC SURGERY      CLOSURE N/A 12/1/2023    Procedure: CLOSURE, TEMPORARY;  Surgeon: Yuri Washington MD;  [facility];  Service: Cardiovascular;  Laterality: N/A;    DRAINAGE OF PLEURAL EFFUSION  12/4/2023    Procedure: DRAINAGE, PLEURAL EFFUSION;  Surgeon: Yuri Washington MD;  [facility];  Service: Cardiovascular;;    INSERTION OF GRAFT TO PERICARDIUM  12/4/2023    Procedure: INSERTION, GRAFT, PERICARDIUM;  Surgeon: Yuri Washington MD;  [facility];  Service: Cardiovascular;;    INSERTION OF INTRA-AORTIC BALLOON ASSIST DEVICE Right 11/21/2023    Procedure: INSERTION, INTRA-AORTIC BALLOON PUMP;  Surgeon: Finn Cohn MD;  Location: [facility] CATH LAB;  Service: Cardiology;  Laterality: Right;    LEFT VENTRICULAR ASSIST DEVICE Left 12/1/2023    Procedure:  INSERTION-LEFT VENTRICULAR ASSIST DEVICE;  Surgeon: Yuri Washington MD;  Location: [facility] OR [facility];  Service: Cardiovascular;  Laterality: Left;  REDO STERNOTOMY - REDO SAW NEEDED FOR CASE    LYSIS OF ADHESIONS  12/1/2023    Procedure: LYSIS, ADHESIONS;  Surgeon: Yuri Washington MD;  Location: [facility] OR [facility];  Service: Cardiovascular;;    PLACEMENT OF SWAN ROLANDO CATHETER WITH IMAGING GUIDANCE  11/20/2023    Procedure: INSERTION, CATHETER, SWAN-ROLANDO, WITH IMAGING GUIDANCE;  Surgeon: Sajan Hurley MD;  Location: [facility] CATH LAB;  Service: Cardiology;;    REMOVAL OF TUNNELED CENTRAL VENOUS CATHETER (CVC) N/A 3/1/2024    Procedure: REMOVAL, CATHETER, CENTRAL VENOUS, TUNNELED;  Surgeon: Seble Aguilar MD;  Location: [facility] CATH LAB;  Service: Interventional Nephrology;  Laterality: N/A;    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 12/1/2023    Procedure: REPAIR, ANEURYSM, ARTERY, FEMORAL;  Surgeon: Yuri Washington MD;  [facility];  Service: Cardiovascular;  Laterality: Right;  Right Femoral Artery Repair    RIGHT HEART CATHETERIZATION Right 10/10/2023    Procedure: INSERTION, CATHETER, RIGHT HEART;  Surgeon: Bin Gandhi MD;  Location: [facility] CATH LAB;  Service: Cardiology;  Laterality: Right;    RIGHT HEART CATHETERIZATION Right 10/13/2023    Procedure: INSERTION, CATHETER, RIGHT HEART;  Surgeon: Walter Mcintyre MD;  Location: [facility] CATH LAB;  Service: Cardiology;  Laterality: Right;    RIGHT HEART CATHETERIZATION  11/13/2023    RIGHT HEART CATHETERIZATION Right 11/13/2023    Procedure: INSERTION, CATHETER, RIGHT HEART;  Surgeon: Juventino Bermudez Jr., MD;  Location: [facility] CATH LAB;  Service: Cardiology;  Laterality: Right;    RIGHT HEART CATHETERIZATION Right 11/20/2023    Procedure: INSERTION, CATHETER, RIGHT HEART;  Surgeon: Sajan Hurley MD;  Location: [facility] CATH LAB;  Service: Cardiology;  Laterality: Right;    RIGHT HEART CATHETERIZATION Right 1/22/2024    Procedure: INSERTION,  CATHETER, RIGHT HEART;  Surgeon: Brayan Ocampo MD;  Location: [facility] CATH LAB;  Service: Cardiology;  Laterality: Right;    STERNAL WOUND CLOSURE N/A 12/4/2023    Procedure: CLOSURE, WOUND, STERNUM;  Surgeon: Yuri Washington MD;  Location: [facility] OR [facility];  Service: Cardiovascular;  Laterality: N/A;    STERNOTOMY N/A 12/1/2023    Procedure: STERNOTOMY, REDO;  Surgeon: Yuri Washington MD;  Location: [facility] OR [facility]LR;  Service: Cardiovascular;  Laterality: N/A;    VALVULOPLASTY, MITRAL VALVE N/A 12/1/2023    Procedure: VALVULOPLASTY, MITRAL VALVE;  Surgeon: Yuri Washington MD;  Location: [facility] OR [facility];  Service: Cardiovascular;  Laterality: N/A;     Social History     Tobacco Use    Smoking status: Former     Current packs/day: 0.50     Types: Cigarettes   Substance Use Topics    Alcohol use: Yes     Comment: rarely    Drug use: No     Review of patient's allergies indicates:  No Known Allergies    Medications: I have reviewed the current medication administration record.    Medications Prior to Admission   Medication Sig Dispense Refill Last Dose    acetaminophen (TYLENOL) 500 MG tablet Take 2 tablets (1,000 mg total) by mouth every 8 (eight) hours as needed for Pain.  0     amiodarone (PACERONE) 400 MG tablet Take 1 tablet (400 mg total) by mouth once daily. 90 tablet 3     bumetanide (BUMEX) 2 MG tablet Take 2 tablets (4 mg total) by mouth 2 (two) times a day. 120 tablet 11     dextrose 5 % in water (D5W) PgBk 100 mL with cefTRIAXone 2 gram SolR 2 g Inject 2 g into the vein every 12 (twelve) hours.       famotidine (PEPCID) 20 MG tablet Take 1 tablet (20 mg total) by mouth once daily. 90 tablet 3     fluticasone propionate (FLONASE) 50 mcg/actuation nasal spray 2 sprays (100 mcg total) by Each Nostril route once daily.  0     gabapentin (NEURONTIN) 100 MG capsule Take 1 capsule (100 mg total) by mouth 2 (two) times daily. 60 capsule 11     hydrALAZINE (APRESOLINE) 100 MG tablet Take 1 tablet (100 mg total) by  mouth every 8 (eight) hours. 90 tablet 11     magnesium oxide (MAG-OX) 400 mg (241.3 mg magnesium) tablet Take 1 tablet (400 mg total) by mouth once daily. 30 tablet 11     metOLazone (ZAROXOLYN) 5 MG tablet Take 1 tablet (5 mg total) by mouth once weekly as needed on Wednesdays only if >5lb weight gain in 1 week 12 tablet 3     omega-3 acid ethyl esters (LOVAZA) 1 gram capsule Take 2 capsules (2 g total) by mouth 2 (two) times daily. 360 capsule 3     pulse oximeter (PULSE OXIMETER) device by Apply Externally route 2 (two) times a day. Use twice daily at 8 AM and 3 PM and record the value in [employer] as directed. 1 each 0     senna-docusate 8.6-50 mg (PERICOLACE) 8.6-50 mg per tablet Take 2 tablets by mouth daily as needed for Constipation. 30 tablet 11     sodium bicarbonate 325 MG tablet Take 1 tablet (325 mg total) by mouth 3 (three) times daily. 90 tablet 11     sodium chloride 0.9 % PgBk 100 mL with ampicillin 2 gram SolR 2 g Inject 2 g into the vein every 12 (twelve) hours.       traZODone (DESYREL) 50 MG tablet Take 0.5 tablets (25 mg total) by mouth every evening. 45 tablet 3     venlafaxine (EFFEXOR) 37.5 MG Tab Take 1 tablet (37.5 mg total) by mouth once daily. 90 tablet 3     vitamin D (VITAMIN D3) 1000 units Tab Take 1 tablet (1,000 Units total) by mouth once daily. 90 tablet 3     warfarin (COUMADIN) 2.5 MG tablet Take 3.75mg orally daily on Tuesday and Thursdays. Take 2.5mg orally daily on all other days.          Review of Systems   Constitutional:  Negative for chills and fever.   HENT:  Negative for ear discharge and ear pain.    Eyes:  Negative for pain and itching.   Respiratory:  Negative for cough and shortness of breath.    Cardiovascular:  Positive for leg swelling. Negative for chest pain.   Gastrointestinal:  Negative for abdominal distention and anal bleeding.   Endocrine: Negative for cold intolerance and heat intolerance.   Genitourinary:  Negative for enuresis and flank pain.    Musculoskeletal:  Positive for gait problem.   Allergic/Immunologic: Positive for immunocompromised state.   Neurological:  Positive for weakness and numbness.   Hematological:  Bruises/bleeds easily.   Psychiatric/Behavioral:  Negative for agitation and confusion.      Objective:     Vital Signs (Most Recent):  Temp: 98.4 °F (36.9 °C) (05/28/24 0043)  Pulse: 85 (05/28/24 0155)  Resp: 14 (05/28/24 0043)  BP: (!) 102/0 (05/28/24 0300)  SpO2: 95 % (05/28/24 0043)    Vital Signs Range (Last 24H):  Temp:  [98.4 °F (36.9 °C)]   Pulse:  [84-87]   Resp:  [14-22]   BP: ()/(0-81)   SpO2:  [95 %-98 %]        Physical Exam  Vitals and nursing note reviewed.   HENT:      Head: Normocephalic and atraumatic.   Eyes:      Extraocular Movements: Extraocular movements intact.      Conjunctiva/sclera: Conjunctivae normal.      Pupils: Pupils are equal, round, and reactive to light.   Cardiovascular:      Comments: LVAD hum  Pulmonary:      Effort: Pulmonary effort is normal.      Breath sounds: Normal breath sounds.   Abdominal:      General: Abdomen is flat.      Palpations: Abdomen is soft.   Musculoskeletal:         General: Normal range of motion.      Cervical back: Normal range of motion.      Right lower leg: Edema present.      Left lower leg: Edema present.   Neurological:      Mental Status: He is alert and oriented to person, place, and time.      Sensory: Sensory deficit present.      Motor: Weakness present.              Neurological Exam:   LOC: alert  Attention Span: Good   Language: No aphasia  Articulation: No dysarthria  Orientation: Person, Place, Time   Visual Fields: Full  EOM (CN III, IV, VI): Full/intact  Pupils (CN II, III): PERRL  Facial Sensation (CN V): Normal  Facial Movement (CN VII): Symmetric facial expression    Gag Reflex: present  Reflexes: flexor plantar responses bilaterally  Motor: Arm left  Normal 5/5  Leg left  Normal 5/5  Arm right  Normal 5/5  Leg right Paresis: 4/5  Cerebellum: No  "evidence of appendicular or axial ataxia  Sensation: Marquise-hypoesthesia right  Tone: Normal tone throughout      Laboratory:  CMP:   Recent Labs   Lab 05/27/24  1636   CALCIUM 9.1   ALBUMIN 2.8*   PROT 8.1      K 3.1*   CO2 29   CL 97   BUN 66*   CREATININE 6.6*   ALKPHOS 112   ALT 30   AST 27   BILITOT 0.2     CBC:   Recent Labs   Lab 05/27/24  1636   WBC 5.31   RBC 2.33*   HGB 6.9*   HCT 21.5*      MCV 92   MCH 29.6   MCHC 32.1     Lipid Panel:   Recent Labs   Lab 05/23/24  0940   CHOL 143   LDLCALC 77.8   HDL 48   TRIG 86     Coagulation:   Recent Labs   Lab 05/27/24  1636   INR 4.2*   APTT 51.8*     Hgb A1C: No results for input(s): "HGBA1C" in the last 168 hours.  TSH:   Recent Labs   Lab 05/23/24  0940   TSH 0.842       Diagnostic Results:      Brain imaging:  CT Head w/o contrast 5-27-24 results:  No acute or adverse change     Vessel Imaging:      Cardiac Evaluation:   2 D Echo 4-18-24 results:    LVAD: There is a Heartmate III LVAD running at 5000 RPM. The aortic valve does not open. The ventricular septum is at midline.    Left Ventricle: The left ventricle is normal in size. Normal wall thickness. Severe global hypokinesis present. There is severely reduced systolic function with a visually estimated ejection fraction of 10 -15%. Unable to assess diastolic function due to LVAD.    Right Ventricle: Normal right ventricular cavity size. Wall thickness is normal. Right ventricle wall motion  is normal. Systolic function is moderately reduced.    The left atrium is severely dilated.    Mitral Valve: The mitral valve is repaired with an Noble stitch.    IVC/SVC: Normal venous pressure at 3 mmHg.      Ijeoma Mcgee, NP  [facility]  Department of Vascular Neurology   [facility] - [facility]   "

## 2024-05-28 NOTE — SUBJECTIVE & OBJECTIVE
Past Medical History:   Diagnosis Date    CAD (coronary artery disease)     CHF (congestive heart failure)     Diabetes mellitus     HFrEF (heart failure with reduced ejection fraction)     ICD (implantable cardioverter-defibrillator) in place     MI, old        Past Surgical History:   Procedure Laterality Date    ANGIOPLASTY-VENOUS ARTERY Right 12/1/2023    Procedure: ANGIOPLASTY-VENOUS ARTERY, RIGHT FEMORAL;  Surgeon: Yuri Washington MD;  [facility];  Service: Cardiovascular;  Laterality: Right;    AORTIC VALVULOPLASTY N/A 12/1/2023    Procedure: REPAIR, AORTIC VALVE;  Surgeon: Yuri Washington MD;  [facility];  Service: Cardiovascular;  Laterality: N/A;    CARDIAC SURGERY      CLOSURE N/A 12/1/2023    Procedure: CLOSURE, TEMPORARY;  Surgeon: Yuri Washington MD;  [facility];  Service: Cardiovascular;  Laterality: N/A;    DRAINAGE OF PLEURAL EFFUSION  12/4/2023    Procedure: DRAINAGE, PLEURAL EFFUSION;  Surgeon: Yuri Washington MD;  [facility];  Service: Cardiovascular;;    INSERTION OF GRAFT TO PERICARDIUM  12/4/2023    Procedure: INSERTION, GRAFT, PERICARDIUM;  Surgeon: Yuri Washington MD;  Location: [facility] OR [address] [place] [address];  Service: Cardiovascular;;    INSERTION OF INTRA-AORTIC BALLOON ASSIST DEVICE Right 11/21/2023    Procedure: INSERTION, INTRA-AORTIC BALLOON PUMP;  Surgeon: Finn Cohn MD;  Location: [facility] CATH LAB;  Service: Cardiology;  Laterality: Right;    LEFT VENTRICULAR ASSIST DEVICE Left 12/1/2023    Procedure: INSERTION-LEFT VENTRICULAR ASSIST DEVICE;  Surgeon: Yuri Washington MD;  Location: [facility] OR [address] [place] [address];  Service: Cardiovascular;  Laterality: Left;  REDO STERNOTOMY - REDO SAW NEEDED FOR CASE    LYSIS OF ADHESIONS  12/1/2023    Procedure: LYSIS, ADHESIONS;  Surgeon: Yuri Washington MD;  Location: [facility] OR [address] [place] [address];  Service: Cardiovascular;;    PLACEMENT OF SWAN ROLANDO CATHETER WITH IMAGING GUIDANCE  11/20/2023    Procedure: INSERTION,  CATHETER, SWAN-ROLANDO, WITH IMAGING GUIDANCE;  Surgeon: Sajan Hurley MD;  Location: [facility] CATH LAB;  Service: Cardiology;;    REMOVAL OF TUNNELED CENTRAL VENOUS CATHETER (CVC) N/A 3/1/2024    Procedure: REMOVAL, CATHETER, CENTRAL VENOUS, TUNNELED;  Surgeon: Seble Aguilar MD;  Location: [facility] CATH LAB;  Service: Interventional Nephrology;  Laterality: N/A;    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 12/1/2023    Procedure: REPAIR, ANEURYSM, ARTERY, FEMORAL;  Surgeon: Yuri Washington MD;  [facility];  Service: Cardiovascular;  Laterality: Right;  Right Femoral Artery Repair    RIGHT HEART CATHETERIZATION Right 10/10/2023    Procedure: INSERTION, CATHETER, RIGHT HEART;  Surgeon: Bin Gandhi MD;  Location: [facility] CATH LAB;  Service: Cardiology;  Laterality: Right;    RIGHT HEART CATHETERIZATION Right 10/13/2023    Procedure: INSERTION, CATHETER, RIGHT HEART;  Surgeon: Walter Mcintyre MD;  Location: [facility] CATH LAB;  Service: Cardiology;  Laterality: Right;    RIGHT HEART CATHETERIZATION  11/13/2023    RIGHT HEART CATHETERIZATION Right 11/13/2023    Procedure: INSERTION, CATHETER, RIGHT HEART;  Surgeon: Juventino Bermudez Jr., MD;  Location: [facility] CATH LAB;  Service: Cardiology;  Laterality: Right;    RIGHT HEART CATHETERIZATION Right 11/20/2023    Procedure: INSERTION, CATHETER, RIGHT HEART;  Surgeon: Sajan Hurley MD;  Location: [facility] CATH LAB;  Service: Cardiology;  Laterality: Right;    RIGHT HEART CATHETERIZATION Right 1/22/2024    Procedure: INSERTION, CATHETER, RIGHT HEART;  Surgeon: Brayan Ocampo MD;  Location: [facility] CATH LAB;  Service: Cardiology;  Laterality: Right;    STERNAL WOUND CLOSURE N/A 12/4/2023    Procedure: CLOSURE, WOUND, STERNUM;  Surgeon: Yuri Washington MD;  [facility];  Service: Cardiovascular;  Laterality: N/A;    STERNOTOMY N/A 12/1/2023    Procedure: STERNOTOMY, REDO;  Surgeon: Yuri Washington MD;  [facility];  Service:  Cardiovascular;  Laterality: N/A;    VALVULOPLASTY, MITRAL VALVE N/A 12/1/2023    Procedure: VALVULOPLASTY, MITRAL VALVE;  Surgeon: Yuri Washington MD;  Location: [facility] OR [address] [place] [address];  Service: Cardiovascular;  Laterality: N/A;       Review of patient's allergies indicates:  No Known Allergies    Current Facility-Administered Medications   Medication    amiodarone tablet 400 mg    ampicillin (OMNIPEN) 2 g in sodium chloride 0.9 % 100 mL IVPB (MB+)    bumetanide injection 2 mg    cefTRIAXone (ROCEPHIN) 2 g in dextrose 5 % in water (D5W) 100 mL IVPB (MB+)    famotidine tablet 20 mg    fluticasone propionate 50 mcg/actuation nasal spray 100 mcg    gabapentin capsule 100 mg    hydrALAZINE tablet 100 mg    magnesium oxide tablet 400 mg    omega-3 acid ethyl esters capsule 2 g    potassium chloride SA CR tablet 40 mEq    senna-docusate 8.6-50 mg per tablet 2 tablet    trazodone split tablet 25 mg    venlafaxine tablet 37.5 mg    vitamin D 1000 units tablet 1,000 Units     Family History    None       Tobacco Use    Smoking status: Former     Current packs/day: 0.50     Types: Cigarettes    Smokeless tobacco: Not on file   Substance and Sexual Activity    Alcohol use: Yes     Comment: rarely    Drug use: No    Sexual activity: Not Currently     Partners: Female     Review of Systems   Constitutional:  Positive for activity change, fatigue and unexpected weight change.   Respiratory:  Negative for chest tightness and shortness of breath.    Neurological:  Positive for numbness.     Objective:     Vital Signs (Most Recent):  Temp: 98.4 °F (36.9 °C) (05/28/24 0043)  Pulse: 85 (05/28/24 0155)  Resp: 14 (05/28/24 0043)  BP: (!) 98/0 (05/28/24 0219)  SpO2: 95 % (05/28/24 0043) Vital Signs (24h Range):  Temp:  [98.4 °F (36.9 °C)] 98.4 °F (36.9 °C)  Pulse:  [84-87] 85  Resp:  [14-22] 14  SpO2:  [95 %-98 %] 95 %  BP: ()/(0-81) 98/0     No data found.  There is no height or weight on file to calculate BMI.    No intake or output  "data in the 24 hours ending 05/28/24 0228       Physical Exam  HENT:      Head: Normocephalic.      Nose: Nose normal.   Eyes:      Pupils: Pupils are equal, round, and reactive to light.   Cardiovascular:      Rate and Rhythm: Normal rate.      Comments: VAD hum present    Musculoskeletal:      Right lower leg: Edema present.      Left lower leg: Edema present.   Neurological:      General: No focal deficit present.      Mental Status: He is alert.      Comments: R foot with sensory 0/5 and motor loss 2/5 on exam   Psychiatric:         Mood and Affect: Mood normal.            Significant Labs:  CBC:  Recent Labs   Lab 05/23/24  0940 05/27/24  1305 05/27/24  1636   WBC 5.79 5.36 5.31   RBC 2.20* 2.23* 2.33*   HGB 6.4* 6.5* 6.9*   HCT 20.3* 20.9* 21.5*    254 242   MCV 92 94 92   MCH 29.1 29.1 29.6   MCHC 31.5* 31.1* 32.1     BNP:  Recent Labs   Lab 05/23/24  0940   BNP 1,425*     CMP:  Recent Labs   Lab 05/23/24  0940 05/27/24  1305 05/27/24  1636   * 122* 131*   CALCIUM 8.8 8.8 9.1   ALBUMIN 2.6* 2.8* 2.8*   PROT 7.4 7.3 8.1    140 142   K 3.2* 3.0* 3.1*   CO2 27 28 29   CL 98 95 97   BUN 59* 68* 66*   CREATININE 6.4* 6.5* 6.6*   ALKPHOS 115 107 112   ALT 28 33 30   AST 40 28 27   BILITOT 0.2 0.2 0.2      Coagulation:   Recent Labs   Lab 05/23/24  0940 05/27/24  1305 05/27/24  1636   INR 3.7* 4.3* 4.2*   APTT  --  56.0* 51.8*     LDH:  No results for input(s): "LDH" in the last 72 hours.  Microbiology:  Microbiology Results (last 7 days)       ** No results found for the last 168 hours. **            Cardiac Markers: No results for input(s): "CKMB", "TROPONINT", "MYOGLOBIN" in the last 72 hours.  Coagulation:   Recent Labs   Lab 05/27/24  1636   INR 4.2*   APTT 51.8*     Prealbumin: No results for input(s): "PREALBUMIN" in the last 72 hours.  I have reviewed all pertinent labs within the past 24 hours.    Diagnostic Results:        "

## 2024-05-28 NOTE — PLAN OF CARE
Problem: Adult Inpatient Plan of Care  Goal: Plan of Care Review  Outcome: Progressing  Goal: Patient-Specific Goal (Individualized)  Outcome: Progressing  Goal: Absence of Hospital-Acquired Illness or Injury  Outcome: Progressing  Goal: Optimal Comfort and Wellbeing  Outcome: Progressing  Goal: Readiness for Transition of Care  Outcome: Progressing     Problem: Diabetes Comorbidity  Goal: Blood Glucose Level Within Targeted Range  Outcome: Progressing     Problem: Acute Kidney Injury/Impairment  Goal: Fluid and Electrolyte Balance  Outcome: Progressing  Goal: Improved Oral Intake  Outcome: Progressing  Goal: Effective Renal Function  Outcome: Progressing     Problem: Pneumonia  Goal: Fluid Balance  Outcome: Progressing  Goal: Resolution of Infection Signs and Symptoms  Outcome: Progressing  Goal: Effective Oxygenation and Ventilation  Outcome: Progressing     Problem: Infection  Goal: Absence of Infection Signs and Symptoms  Outcome: Progressing     Problem: Ventricular Assist Device  Goal: Optimal Adjustment to Device  Outcome: Progressing  Goal: Absence of Bleeding  Outcome: Progressing  Goal: Absence of Embolism Signs and Symptoms  Outcome: Progressing  Goal: Optimal Blood Flow  Outcome: Progressing  Goal: Absence of Infection Signs and Symptoms  Outcome: Progressing  Goal: Effective Right-Sided Heart Function  Outcome: Progressing     Problem: Fall Injury Risk  Goal: Absence of Fall and Fall-Related Injury  Outcome: Progressing    Pt educated on fall risk, pt remained free from falls/trauma/injury. Denies chest pain, SOB, palpitations or dizziness. Plan of care reviewed with pt. Bed locked in lowest position, call bell within reach, no acute distress noted, will continue to monitor.

## 2024-05-28 NOTE — NURSING
"Rounded on patient, sitting up in chair, no family at bedside, RN at bedside giving meds. Spoke to patient about his falls and right foot. He states that his foot was giving him trouble in clinic last week but he didn't mention anything. It has actually been going numb for a week now. He will awaken without weakness but after being awake for a couple of hours, he feels his foot start to tingle and then go numb. This is when he has to "drag" his foot. Neuro has seen the patient and there is no intervention needed. PT saw the patient and is going to get him a boot for foot drop.     I spoke with Hugh Duran NP regarding Creatinine and Hgb. He will discuss with Dr. Gema Casas if need to consult Nephrology or HemOnc or both.   "

## 2024-05-28 NOTE — NURSING
Patient doppler 116/0, MAP MD Anne alerted. IV push hydralazine ordered and given. Will reassess BP 30 minutes later and continue to monitor.

## 2024-05-28 NOTE — ASSESSMENT & PLAN NOTE
Procedure: Device Interrogation Including analysis of device parameters  Current Settings: Ventricular Assist Device  Review of device function is stable    Cr is elevated, leg edema present and weight went up by 2 lbs  Home regimen is 4 bid po bumex with prn metolazone  Will start IV bumex 2 mg bid here and assess volume status closely daily        5/28/2024     1:06 AM 5/23/2024    11:40 AM 5/1/2024     3:27 PM 5/1/2024    12:23 PM 5/1/2024     8:25 AM 5/1/2024     5:02 AM 4/30/2024    11:51 PM   TXP LVAD INTERROGATIONS   Type HeartMate3  HeartMate3 HeartMate3 HeartMate3 HeartMate3 HeartMate3   Flow 3.9  4 4.4 4.1 3.8 4.1   Speed 5000  5000 5000 5000 5000 5000   PI 5.9  4.4 4.5 4 4.5 3.8   Power ([place]) 3.4  3.5 3.5 3.4 3.4 3.4   LSL 4600  4600 4600 4600 4600 4600   Pulsatility  Intermittent pulse Intermittent pulse Pulse Pulse Pulse Pulse

## 2024-05-28 NOTE — PROGRESS NOTES
Admit Note     Met with patient to assess needs. Patient is a 64 y.o.  male, admitted for  weakness in foot, s/p fall. Pt has LVAD which was placed 12/2023. Pt's wife assisted with DL dressing changes .      Patient admitted on 5/28/2024 .  At this time, patient presents as alert and oriented x 4, pleasant, good eye contact, recall good, concentration/judgement good, average intelligence, calm, and communicative.  At this time, patients caregiver is not present.    Household/Family Systems     Patient resides with patient's wife (Arlyn).    Address:   [id number] [place] [id number].      Support system includes pt's wife, siblings, and adult children.  Patient does not have dependents that are need of being cared for.     Patients primary caregiver is Arlyn Abbott, dorothea wife, phone number [phone].      Confirmed patients contact information is [phone].    Alternate family contact:  Teetee Zhou (sister) [phone]; [phone]  .  During admission, patient's caregiver plans to stay at home.  Confirmed patient and patients caregivers do have access to reliable transportation.    Cognitive Status/Learning     Patient reports reading ability as 12th grade and states patient does not have difficulty with reading, writing, seeing, hearing, comprehension, learning, and memory.  Patient reports patient learns best by one on one support.   Needed: No.   Highest education level: High School (9-12) or GED    Vocation/Disability   .  Working for Income: No  If no, reason not working: Disability  Patient is due to heart issues since 2009.  Pt's wife works at [employer] as a patient access rep.     Adherence     Patient reports a high level of adherence to patients health care regimen.  Adherence counseling and education provided. Patient verbalizes understanding.    Substance Use    Patient reports the following substance usage.    Tobacco:  No current use.   Last use  2023 .  Alcohol: none, patient denies any use.  Illicit Drugs/Non-prescribed Medications: none, patient denies any use.  Patient states clear understanding of the potential impact of substance use.  Substance abstinence/cessation counseling, education and resources provided and reviewed.     Services Utilizing/ADLS    Infusion Service: Prior to admission, patient utilizing? yes ([facility] for IV abx)  Home Health: Prior to admission, patient utilizing? yes ([facility])  DME: Prior to admission, yes [facility]  Pulmonary/Cardiac Rehab: Prior to admission, no  Dialysis:  Prior to admission, no  Transplant Specialty Pharmacy:  Prior to admission, no.    [facility] [phone]  [facility] [phone]    Prior to admission, patient reports patient was independent with ADLS and was not driving.  Patient reports patient is not able to care for self at this time due to compromised medical condition (as documented in medical record) and physical weakness..  Patient indicates a willingness to care for self once medically cleared to do so.    Insurance/Medications    Insured by   Payer/Plan Subscr  Sex Relation Sub. Ins. ID Effective Group Num   1. [place] ALISON ZENDEJAS 1960 Male Self [id number]                                   [address] [address]   2. [employer]ISON BUCK 1960 Male Self [id number] 10/31/23                                    [address] [address]      Primary Insurance (for UNOS reporting): Public Insurance - Medicaid  Secondary Insurance (for UNOS reporting): None    Patient reports patient is able to obtain and afford medications at this time and at time of discharge.    Living Will/Healthcare Power of     Patient states patient has a LW and/or HCPA.   provided education regarding LW and HCPA and the completion of forms.    Coping/Mental Health    Patient is coping adequately with the aid of  family members.   Patient denies mental health difficulties at [facility]  time. Pt does have hx of anxiety and depression related to his medical issues.      Discharge Planning    At time of discharge, patient plans to return to patient's home under the care of his wife.  Patients wife will transport patient.  Per rounds today, expected discharge date has not been medically determined at this time. Patient and patients caregiver  verbalize understanding and are involved in treatment planning and discharge process.    Additional Concerns    Patient is being followed for needs, education, resources, information, emotional support, supportive counseling, and for supportive and skilled discharge plan of care.  providing ongoing psychosocial support, education, resources and d/c planning as needed.  SW remains available. Patient denies additional needs and/or concerns at this time. Patient verbalizes understanding and agreement with information reviewed, social work availability, and how to access available resources as needed.

## 2024-05-28 NOTE — SUBJECTIVE & OBJECTIVE
Subjective:     Post-Op Info:  * No surgery found *          Reason for Visit:  Patient is seen in follow up management of LVAD Management     Interval History: Admitted for falls and right leg weakness. Vascular neurology consulted.  Unable to obtain MRI.     Medications:  Continuous Infusions:  Scheduled Meds:   amiodarone  400 mg Oral Daily    ampicillin (OMNIPEN) 2 g in sodium chloride 0.9 % 100 mL IVPB (MB+)  2 g Intravenous Q12H    atorvastatin  40 mg Oral QHS    bumetanide  2 mg Intravenous BID    cefTRIAXone (ROCEPHIN) 2 g in dextrose 5 % in water (D5W) 100 mL IVPB (MB+)  2 g Intravenous Q12H    famotidine  20 mg Oral Daily    fluticasone propionate  2 spray Each Nostril Daily    gabapentin  100 mg Oral BID    hydrALAZINE  100 mg Oral Q8H    magnesium oxide  400 mg Oral Daily    omega-3 acid ethyl esters  2 g Oral BID    traZODone  25 mg Oral QHS    venlafaxine  37.5 mg Oral Daily    vitamin D  1,000 Units Oral Daily     PRN Meds:  Current Facility-Administered Medications:     senna-docusate 8.6-50 mg, 2 tablet, Oral, Daily PRN     Objective:     Vital Signs (Most Recent):  Temp: 98.2 °F (36.8 °C) (05/28/24 0744)  Pulse: 89 (05/28/24 0458)  Resp: 18 (05/28/24 0744)  BP: (!) 106/0 (05/28/24 1225)  SpO2: 96 % (05/28/24 0445) Vital Signs (24h Range):  Temp:  [98.2 °F (36.8 °C)-98.6 °F (37 °C)] 98.2 °F (36.8 °C)  Pulse:  [84-90] 89  Resp:  [14-22] 18  SpO2:  [80 %-98 %] 96 %  BP: ()/(0-81) 106/0       Intake/Output Summary (Last 24 hours) at 5/28/2024 1355  Last data filed at 5/28/2024 0909  Gross per 24 hour   Intake 420 ml   Output 475 ml   Net -55 ml       Physical Exam  HENT:      Head: Normocephalic.   Eyes:      Extraocular Movements: Extraocular movements intact.   Cardiovascular:      Rate and Rhythm: Normal rate and regular rhythm.      Comments: LVAD hum is smooth  Pulmonary:      Effort: Pulmonary effort is normal.      Breath sounds: Normal breath sounds.   Abdominal:      General: Abdomen is  flat.      Palpations: Abdomen is soft.   Skin:     General: Skin is warm and dry.   Neurological:      General: No focal deficit present.         Significant Labs:  BMP:   Recent Labs   Lab 05/28/24  1117 05/28/24  1244   * 210*   * 140   K 3.9 4.2    99   CO2 25 26   BUN 59* 63*   CREATININE 6.0* 6.5*   CALCIUM 8.7 9.3   MG 1.7  --      CBC:   Recent Labs   Lab 05/28/24  1244   WBC 5.44   RBC 2.39*   HGB 6.9*   HCT 22.8*      MCV 95   MCH 28.9   MCHC 30.3*     CMP:   Recent Labs   Lab 05/27/24  1636 05/28/24  0559 05/28/24  1244   *   < > 210*   CALCIUM 9.1   < > 9.3   ALBUMIN 2.8*  --   --    PROT 8.1  --   --       < > 140   K 3.1*   < > 4.2   CO2 29   < > 26   CL 97   < > 99   BUN 66*   < > 63*   CREATININE 6.6*   < > 6.5*   ALKPHOS 112  --   --    ALT 30  --   --    AST 27  --   --    BILITOT 0.2  --   --     < > = values in this interval not displayed.     Coagulation:   Recent Labs   Lab 05/28/24  0559   INR 3.4*   APTT 49.3*       Significant Diagnostics:  I have reviewed and interpreted all pertinent imaging results/findings within the past 24 hours.    Procedure: Device Interrogation Including analysis of device parameters  Current Settings: Ventricular Assist Device  Review of device function is stable      5/28/2024    12:26 PM 5/28/2024     7:44 AM 5/28/2024     4:00 AM 5/28/2024     1:06 AM 5/23/2024    11:40 AM 5/1/2024     3:27 PM 5/1/2024    12:23 PM   TXP LVAD INTERROGATIONS   Type HeartMate3 HeartMate3 HeartMate3 HeartMate3  HeartMate3 HeartMate3   Flow 3.6 4 3.8 3.9  4 4.4   Speed 5050 5000 5000 5000  5000 5000   PI 7.2 4.4 5.7 5.9  4.4 4.5   Power ([place]) 3.4 3.4 3.4 3.4  3.5 3.5   LSL 4600 4600 4600 4600  4600 4600   Pulsatility Pulse Pulse Intermittent pulse Pulse Intermittent pulse Intermittent pulse Pulse

## 2024-05-28 NOTE — SUBJECTIVE & OBJECTIVE
Interval History: c/o of ongoing R foot weakness this AM. CT scan head unremarkable. Hypervolemic on exam with worsened renal function. Will continue IVP Bumex 2mg bID. Await final neurology recs. PT/OT to assess.       Scheduled Meds:   amiodarone  400 mg Oral Daily    ampicillin (OMNIPEN) 2 g in sodium chloride 0.9 % 100 mL IVPB (MB+)  2 g Intravenous Q12H    bumetanide  2 mg Intravenous BID    cefTRIAXone (ROCEPHIN) 2 g in dextrose 5 % in water (D5W) 100 mL IVPB (MB+)  2 g Intravenous Q12H    famotidine  20 mg Oral Daily    fluticasone propionate  2 spray Each Nostril Daily    gabapentin  100 mg Oral BID    hydrALAZINE  100 mg Oral Q8H    magnesium oxide  400 mg Oral Daily    omega-3 acid ethyl esters  2 g Oral BID    traZODone  25 mg Oral QHS    venlafaxine  37.5 mg Oral Daily    vitamin D  1,000 Units Oral Daily     PRN Meds:  Current Facility-Administered Medications:     senna-docusate 8.6-50 mg, 2 tablet, Oral, Daily PRN    Review of patient's allergies indicates:  No Known Allergies  Objective:     Vital Signs (Most Recent):  Temp: 98.2 °F (36.8 °C) (05/28/24 0744)  Pulse: 89 (05/28/24 0458)  Resp: 18 (05/28/24 0744)  BP: 117/77 (05/28/24 0458)  SpO2: 96 % (05/28/24 0445) Vital Signs (24h Range):  Temp:  [98.2 °F (36.8 °C)-98.6 °F (37 °C)] 98.2 °F (36.8 °C)  Pulse:  [84-90] 89  Resp:  [14-22] 18  SpO2:  [80 %-98 %] 96 %  BP: ()/(0-81) 117/77     Patient Vitals for the past 72 hrs (Last 3 readings):   Weight   05/28/24 0200 84.4 kg (186 lb 1.1 oz)     Body mass index is 23.89 kg/m².      Intake/Output Summary (Last 24 hours) at 5/28/2024 1104  Last data filed at 5/28/2024 0909  Gross per 24 hour   Intake 420 ml   Output 475 ml   Net -55 ml       Hemodynamic Parameters:       Telemetry: NSR        Physical Exam  Constitutional:       Appearance: Normal appearance.   HENT:      Head: Normocephalic and atraumatic.   Neck:      Comments: JVD present   Cardiovascular:      Rate and Rhythm: Normal rate and  regular rhythm.      Comments: Smooth VAD hum   Pulmonary:      Effort: Pulmonary effort is normal.      Breath sounds: Normal breath sounds.   Abdominal:      General: Bowel sounds are normal.      Palpations: Abdomen is soft.   Musculoskeletal:      Cervical back: Normal range of motion and neck supple.      Right lower leg: Edema present.      Left lower leg: Edema present.      Comments: R foot weakness/some numbness    Neurological:      General: No focal deficit present.      Mental Status: He is alert and oriented to person, place, and time.      Motor: Weakness present.      Comments: R foot weakness             Significant Labs:  CBC:  Recent Labs   Lab 05/27/24  1305 05/27/24  1636 05/28/24  0559   WBC 5.36 5.31 5.46   RBC 2.23* 2.33* 2.29*   HGB 6.5* 6.9* 6.6*   HCT 20.9* 21.5* 21.7*    242 237   MCV 94 92 95   MCH 29.1 29.6 28.8   MCHC 31.1* 32.1 30.4*     BNP:  Recent Labs   Lab 05/23/24  0940   BNP 1,425*     CMP:  Recent Labs   Lab 05/23/24  0940 05/27/24  1305 05/27/24 1636 05/28/24  0559   * 122* 131* 134*   CALCIUM 8.8 8.8 9.1 8.8   ALBUMIN 2.6* 2.8* 2.8*  --    PROT 7.4 7.3 8.1  --     140 142 141   K 3.2* 3.0* 3.1* 3.1*   CO2 27 28 29 30*   CL 98 95 97 98   BUN 59* 68* 66* 65*   CREATININE 6.4* 6.5* 6.6* 6.3*   ALKPHOS 115 107 112  --    ALT 28 33 30  --    AST 40 28 27  --    BILITOT 0.2 0.2 0.2  --       Coagulation:   Recent Labs   Lab 05/27/24  1305 05/27/24  1636 05/28/24  0559   INR 4.3* 4.2* 3.4*   APTT 56.0* 51.8* 49.3*     LDH:  Recent Labs   Lab 05/28/24  0559   *     Microbiology:  Microbiology Results (last 7 days)       ** No results found for the last 168 hours. **            I have reviewed all pertinent labs within the past 24 hours.    Estimated Creatinine Clearance: 14.1 mL/min (A) (based on SCr of 6.3 mg/dL (H)).    Diagnostic Results:  I have reviewed all pertinent imaging results/findings within the past 24 hours.

## 2024-05-28 NOTE — SUBJECTIVE & OBJECTIVE
Past Medical History:   Diagnosis Date    CAD (coronary artery disease)     CHF (congestive heart failure)     Diabetes mellitus     HFrEF (heart failure with reduced ejection fraction)     ICD (implantable cardioverter-defibrillator) in place     MI, old      Past Surgical History:   Procedure Laterality Date    ANGIOPLASTY-VENOUS ARTERY Right 12/1/2023    Procedure: ANGIOPLASTY-VENOUS ARTERY, RIGHT FEMORAL;  Surgeon: Yuri Washington MD;  [facility];  Service: Cardiovascular;  Laterality: Right;    AORTIC VALVULOPLASTY N/A 12/1/2023    Procedure: REPAIR, AORTIC VALVE;  Surgeon: Yuri Washington MD;  [facility];  Service: Cardiovascular;  Laterality: N/A;    CARDIAC SURGERY      CLOSURE N/A 12/1/2023    Procedure: CLOSURE, TEMPORARY;  Surgeon: Yuri Washington MD;  [facility];  Service: Cardiovascular;  Laterality: N/A;    DRAINAGE OF PLEURAL EFFUSION  12/4/2023    Procedure: DRAINAGE, PLEURAL EFFUSION;  Surgeon: Yuri Washington MD;  [facility];  Service: Cardiovascular;;    INSERTION OF GRAFT TO PERICARDIUM  12/4/2023    Procedure: INSERTION, GRAFT, PERICARDIUM;  Surgeon: Yuri Washington MD;  Location: [facility] OR [address] [place] [address];  Service: Cardiovascular;;    INSERTION OF INTRA-AORTIC BALLOON ASSIST DEVICE Right 11/21/2023    Procedure: INSERTION, INTRA-AORTIC BALLOON PUMP;  Surgeon: Finn Cohn MD;  Location: [facility] CATH LAB;  Service: Cardiology;  Laterality: Right;    LEFT VENTRICULAR ASSIST DEVICE Left 12/1/2023    Procedure: INSERTION-LEFT VENTRICULAR ASSIST DEVICE;  Surgeon: Yuri Washington MD;  Location: [facility] OR [address] [place] [address];  Service: Cardiovascular;  Laterality: Left;  REDO STERNOTOMY - REDO SAW NEEDED FOR CASE    LYSIS OF ADHESIONS  12/1/2023    Procedure: LYSIS, ADHESIONS;  Surgeon: Yuri Washington MD;  Location: [facility] OR [address] [place] [address];  Service: Cardiovascular;;    PLACEMENT OF SWAN ROLANDO CATHETER WITH IMAGING GUIDANCE  11/20/2023    Procedure: INSERTION,  CATHETER, SWAN-ROLANDO, WITH IMAGING GUIDANCE;  Surgeon: Sajan Hurley MD;  Location: [facility] CATH LAB;  Service: Cardiology;;    REMOVAL OF TUNNELED CENTRAL VENOUS CATHETER (CVC) N/A 3/1/2024    Procedure: REMOVAL, CATHETER, CENTRAL VENOUS, TUNNELED;  Surgeon: Seble Aguilar MD;  Location: [facility] CATH LAB;  Service: Interventional Nephrology;  Laterality: N/A;    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 12/1/2023    Procedure: REPAIR, ANEURYSM, ARTERY, FEMORAL;  Surgeon: Yuri Washington MD;  [facility];  Service: Cardiovascular;  Laterality: Right;  Right Femoral Artery Repair    RIGHT HEART CATHETERIZATION Right 10/10/2023    Procedure: INSERTION, CATHETER, RIGHT HEART;  Surgeon: Bin Gandhi MD;  Location: [facility];  Service: Cardiology;  Laterality: Right;    RIGHT HEART CATHETERIZATION Right 10/13/2023    Procedure: INSERTION, CATHETER, RIGHT HEART;  Surgeon: Walter Mcintyre MD;  Location: [facility] CATH LAB;  Service: Cardiology;  Laterality: Right;    RIGHT HEART CATHETERIZATION  11/13/2023    RIGHT HEART CATHETERIZATION Right 11/13/2023    Procedure: INSERTION, CATHETER, RIGHT HEART;  Surgeon: Juventino Bermudez Jr., MD;  Location: [facility] CATH LAB;  Service: Cardiology;  Laterality: Right;    RIGHT HEART CATHETERIZATION Right 11/20/2023    Procedure: INSERTION, CATHETER, RIGHT HEART;  Surgeon: Sajan Hurley MD;  Location: [facility] CATH LAB;  Service: Cardiology;  Laterality: Right;    RIGHT HEART CATHETERIZATION Right 1/22/2024    Procedure: INSERTION, CATHETER, RIGHT HEART;  Surgeon: Brayan Ocampo MD;  Location: [facility] CATH LAB;  Service: Cardiology;  Laterality: Right;    STERNAL WOUND CLOSURE N/A 12/4/2023    Procedure: CLOSURE, WOUND, STERNUM;  Surgeon: Yuri Washington MD;  [facility];  Service: Cardiovascular;  Laterality: N/A;    STERNOTOMY N/A 12/1/2023    Procedure: STERNOTOMY, REDO;  Surgeon: Yuri Washington MD;  [facility];  Service:  Cardiovascular;  Laterality: N/A;    VALVULOPLASTY, MITRAL VALVE N/A 12/1/2023    Procedure: VALVULOPLASTY, MITRAL VALVE;  Surgeon: Yuri Washington MD;  Location: [facility] OR [address] [place] [address];  Service: Cardiovascular;  Laterality: N/A;     Social History     Tobacco Use    Smoking status: Former     Current packs/day: 0.50     Types: Cigarettes   Substance Use Topics    Alcohol use: Yes     Comment: rarely    Drug use: No     Review of patient's allergies indicates:  No Known Allergies    Medications: I have reviewed the current medication administration record.    Medications Prior to Admission   Medication Sig Dispense Refill Last Dose    acetaminophen (TYLENOL) 500 MG tablet Take 2 tablets (1,000 mg total) by mouth every 8 (eight) hours as needed for Pain.  0     amiodarone (PACERONE) 400 MG tablet Take 1 tablet (400 mg total) by mouth once daily. 90 tablet 3     bumetanide (BUMEX) 2 MG tablet Take 2 tablets (4 mg total) by mouth 2 (two) times a day. 120 tablet 11     dextrose 5 % in water (D5W) PgBk 100 mL with cefTRIAXone 2 gram SolR 2 g Inject 2 g into the vein every 12 (twelve) hours.       famotidine (PEPCID) 20 MG tablet Take 1 tablet (20 mg total) by mouth once daily. 90 tablet 3     fluticasone propionate (FLONASE) 50 mcg/actuation nasal spray 2 sprays (100 mcg total) by Each Nostril route once daily.  0     gabapentin (NEURONTIN) 100 MG capsule Take 1 capsule (100 mg total) by mouth 2 (two) times daily. 60 capsule 11     hydrALAZINE (APRESOLINE) 100 MG tablet Take 1 tablet (100 mg total) by mouth every 8 (eight) hours. 90 tablet 11     magnesium oxide (MAG-OX) 400 mg (241.3 mg magnesium) tablet Take 1 tablet (400 mg total) by mouth once daily. 30 tablet 11     metOLazone (ZAROXOLYN) 5 MG tablet Take 1 tablet (5 mg total) by mouth once weekly as needed on Wednesdays only if >5lb weight gain in 1 week 12 tablet 3     omega-3 acid ethyl esters (LOVAZA) 1 gram capsule Take 2 capsules (2 g total) by mouth 2 (two) times  daily. 360 capsule 3     pulse oximeter (PULSE OXIMETER) device by Apply Externally route 2 (two) times a day. Use twice daily at 8 AM and 3 PM and record the value in [facility] as directed. 1 each 0     senna-docusate 8.6-50 mg (PERICOLACE) 8.6-50 mg per tablet Take 2 tablets by mouth daily as needed for Constipation. 30 tablet 11     sodium bicarbonate 325 MG tablet Take 1 tablet (325 mg total) by mouth 3 (three) times daily. 90 tablet 11     sodium chloride 0.9 % PgBk 100 mL with ampicillin 2 gram SolR 2 g Inject 2 g into the vein every 12 (twelve) hours.       traZODone (DESYREL) 50 MG tablet Take 0.5 tablets (25 mg total) by mouth every evening. 45 tablet 3     venlafaxine (EFFEXOR) 37.5 MG Tab Take 1 tablet (37.5 mg total) by mouth once daily. 90 tablet 3     vitamin D (VITAMIN D3) 1000 units Tab Take 1 tablet (1,000 Units total) by mouth once daily. 90 tablet 3     warfarin (COUMADIN) 2.5 MG tablet Take 3.75mg orally daily on Tuesday and Thursdays. Take 2.5mg orally daily on all other days.          Review of Systems   Constitutional:  Negative for chills and fever.   HENT:  Negative for ear discharge and ear pain.    Eyes:  Negative for pain and itching.   Respiratory:  Negative for cough and shortness of breath.    Cardiovascular:  Positive for leg swelling. Negative for chest pain.   Gastrointestinal:  Negative for abdominal distention and anal bleeding.   Endocrine: Negative for cold intolerance and heat intolerance.   Genitourinary:  Negative for enuresis and flank pain.   Musculoskeletal:  Positive for gait problem.   Allergic/Immunologic: Positive for immunocompromised state.   Neurological:  Positive for weakness and numbness.   Hematological:  Bruises/bleeds easily.   Psychiatric/Behavioral:  Negative for agitation and confusion.      Objective:     Vital Signs (Most Recent):  Temp: 98.4 °F (36.9 °C) (05/28/24 0043)  Pulse: 85 (05/28/24 0155)  Resp: 14 (05/28/24 0043)  BP: (!) 102/0 (05/28/24  0300)  SpO2: 95 % (05/28/24 0043)    Vital Signs Range (Last 24H):  Temp:  [98.4 °F (36.9 °C)]   Pulse:  [84-87]   Resp:  [14-22]   BP: ()/(0-81)   SpO2:  [95 %-98 %]        Physical Exam  Vitals and nursing note reviewed.   HENT:      Head: Normocephalic and atraumatic.   Eyes:      Extraocular Movements: Extraocular movements intact.      Conjunctiva/sclera: Conjunctivae normal.      Pupils: Pupils are equal, round, and reactive to light.   Cardiovascular:      Comments: LVAD hum  Pulmonary:      Effort: Pulmonary effort is normal.      Breath sounds: Normal breath sounds.   Abdominal:      General: Abdomen is flat.      Palpations: Abdomen is soft.   Musculoskeletal:         General: Normal range of motion.      Cervical back: Normal range of motion.      Right lower leg: Edema present.      Left lower leg: Edema present.   Neurological:      Mental Status: He is alert and oriented to person, place, and time.      Sensory: Sensory deficit present.      Motor: Weakness present.              Neurological Exam:   LOC: alert  Attention Span: Good   Language: No aphasia  Articulation: No dysarthria  Orientation: Person, Place, Time   Visual Fields: Full  EOM (CN III, IV, VI): Full/intact  Pupils (CN II, III): PERRL  Facial Sensation (CN V): Normal  Facial Movement (CN VII): Symmetric facial expression    Gag Reflex: present  Reflexes: flexor plantar responses bilaterally  Motor: Arm left  Normal 5/5  Leg left  Normal 5/5  Arm right  Normal 5/5  Leg right Paresis: 4/5  Cerebellum: No evidence of appendicular or axial ataxia  Sensation: Marquise-hypoesthesia right  Tone: Normal tone throughout      Laboratory:  CMP:   Recent Labs   Lab 05/27/24  1636   CALCIUM 9.1   ALBUMIN 2.8*   PROT 8.1      K 3.1*   CO2 29   CL 97   BUN 66*   CREATININE 6.6*   ALKPHOS 112   ALT 30   AST 27   BILITOT 0.2     CBC:   Recent Labs   Lab 05/27/24  1636   WBC 5.31   RBC 2.33*   HGB 6.9*   HCT 21.5*      MCV 92   MCH 29.6  "  MCHC 32.1     Lipid Panel:   Recent Labs   Lab 05/23/24  0940   CHOL 143   LDLCALC 77.8   HDL 48   TRIG 86     Coagulation:   Recent Labs   Lab 05/27/24  1636   INR 4.2*   APTT 51.8*     Hgb A1C: No results for input(s): "HGBA1C" in the last 168 hours.  TSH:   Recent Labs   Lab 05/23/24  0940   TSH 0.842       Diagnostic Results:      Brain imaging:  CT Head w/o contrast 5-27-24 results:  No acute or adverse change     Vessel Imaging:      Cardiac Evaluation:   2 D Echo 4-18-24 results:    LVAD: There is a Heartmate III LVAD running at 5000 RPM. The aortic valve does not open. The ventricular septum is at midline.    Left Ventricle: The left ventricle is normal in size. Normal wall thickness. Severe global hypokinesis present. There is severely reduced systolic function with a visually estimated ejection fraction of 10 -15%. Unable to assess diastolic function due to LVAD.    Right Ventricle: Normal right ventricular cavity size. Wall thickness is normal. Right ventricle wall motion  is normal. Systolic function is moderately reduced.    The left atrium is severely dilated.    Mitral Valve: The mitral valve is repaired with an Noble stitch.    IVC/SVC: Normal venous pressure at 3 mmHg.    "

## 2024-05-28 NOTE — PLAN OF CARE
OT evaluation completed. OT POC and goals established.     Problem: Occupational Therapy  Goal: Occupational Therapy Goal  Description: Goals to be met by: 6/11/2024     Patient will increase functional independence with ADLs by performing:    UE Dressing with Modified [place].  LE Dressing with Modified [place].  Grooming while standing at sink with Modified [place].  Toileting from toilet with Modified [place] for hygiene and clothing management.   Step transfer with Modified [place]  Toilet transfer to toilet with Modified [place].  Upper extremity exercise program x12 reps per handout, with independence.   Pt will perform functional mobility for household and community distances with Mod I and LRAD required for independence in occupations of choice.     Outcome: Progressing

## 2024-05-28 NOTE — TELEPHONE ENCOUNTER
Returned page to patient's wife, Arlyn. Wife states that patient fell this morning d/t right leg weakness and it is still weak. He is not able to ambulate independently at this time. Patient is on speakerphone with wife. Patient denies weakness in other extremities, no slurred speech or facial drooping. Wife states that patient does have hand weakness but that is not acute as of today. I advised that patient report to the ER immediately to rule out neurological event such as stroke. Patient states he will come to [facility] tomorrow because it is too late today. I advised that patient go to this local  ER NOW and not come to [facility]. Wife and patient verbalized understanding. Dr. Gema Casas notified via phone.

## 2024-05-28 NOTE — NURSING
Patient arrived to the unit. Telemetry box applied and vital signs documented in flow sheet. Identification band placed on patient. Oriented to room, call bell with in reach, bed is in low lock position. LVAD equipment inventory completed. LVAD coordinatorMarilu, notified of patient's arrival. Admit completed, plan of care initiated.     Nurses Note -- 4 Eyes      Skin assessed during: Admit      [x] No Altered Skin Integrity Present    []Prevention Measures Documented      [] Yes- Altered Skin Integrity Present or Discovered   [] LDA Added if Not in Epic (Describe Wound)   [] New Altered Skin Integrity was Present on Admit and Documented in LDA   [] Wound Image Taken    Wound Care Consulted? No    Attending Nurse:  Arie Avalos RN/Staff Member:   Cindy

## 2024-05-28 NOTE — PROGRESS NOTES
[facility] - Cardiology Stepdown  Heart Transplant  Progress Note    Patient Name: Radha Abbott  MRN: [id number]  Admission Date: 5/28/2024  Hospital Length of Stay: 0 days  Attending Physician: Sajan Hurley, *  Primary Care Provider: Vasu Kong MD  Principal Problem:Weakness of foot, right    Subjective:   Interval History: c/o of ongoing R foot weakness this AM. CT scan head unremarkable. Hypervolemic on exam with worsened renal function. Will continue IVP Bumex 2mg bID. Await final neurology recs. PT/OT to assess.       Scheduled Meds:   amiodarone  400 mg Oral Daily    ampicillin (OMNIPEN) 2 g in sodium chloride 0.9 % 100 mL IVPB (MB+)  2 g Intravenous Q12H    bumetanide  2 mg Intravenous BID    cefTRIAXone (ROCEPHIN) 2 g in dextrose 5 % in water (D5W) 100 mL IVPB (MB+)  2 g Intravenous Q12H    famotidine  20 mg Oral Daily    fluticasone propionate  2 spray Each Nostril Daily    gabapentin  100 mg Oral BID    hydrALAZINE  100 mg Oral Q8H    magnesium oxide  400 mg Oral Daily    omega-3 acid ethyl esters  2 g Oral BID    traZODone  25 mg Oral QHS    venlafaxine  37.5 mg Oral Daily    vitamin D  1,000 Units Oral Daily     PRN Meds:  Current Facility-Administered Medications:     senna-docusate 8.6-50 mg, 2 tablet, Oral, Daily PRN    Review of patient's allergies indicates:  No Known Allergies  Objective:     Vital Signs (Most Recent):  Temp: 98.2 °F (36.8 °C) (05/28/24 0744)  Pulse: 89 (05/28/24 0458)  Resp: 18 (05/28/24 0744)  BP: 117/77 (05/28/24 0458)  SpO2: 96 % (05/28/24 0445) Vital Signs (24h Range):  Temp:  [98.2 °F (36.8 °C)-98.6 °F (37 °C)] 98.2 °F (36.8 °C)  Pulse:  [84-90] 89  Resp:  [14-22] 18  SpO2:  [80 %-98 %] 96 %  BP: ()/(0-81) 117/77     Patient Vitals for the past 72 hrs (Last 3 readings):   Weight   05/28/24 0200 84.4 kg (186 lb 1.1 oz)     Body mass index is 23.89 kg/m².      Intake/Output Summary (Last 24 hours) at 5/28/2024 1104  Last data filed at 5/28/2024 0909  Gross  per 24 hour   Intake 420 ml   Output 475 ml   Net -55 ml       Hemodynamic Parameters:       Telemetry: NSR        Physical Exam  Constitutional:       Appearance: Normal appearance.   HENT:      Head: Normocephalic and atraumatic.   Neck:      Comments: JVD present   Cardiovascular:      Rate and Rhythm: Normal rate and regular rhythm.      Comments: Smooth VAD hum   Pulmonary:      Effort: Pulmonary effort is normal.      Breath sounds: Normal breath sounds.   Abdominal:      General: Bowel sounds are normal.      Palpations: Abdomen is soft.   Musculoskeletal:      Cervical back: Normal range of motion and neck supple.      Right lower leg: Edema present.      Left lower leg: Edema present.      Comments: R foot weakness/some numbness    Neurological:      General: No focal deficit present.      Mental Status: He is alert and oriented to person, place, and time.      Motor: Weakness present.      Comments: R foot weakness             Significant Labs:  CBC:  Recent Labs   Lab 05/27/24  1305 05/27/24  1636 05/28/24  0559   WBC 5.36 5.31 5.46   RBC 2.23* 2.33* 2.29*   HGB 6.5* 6.9* 6.6*   HCT 20.9* 21.5* 21.7*    242 237   MCV 94 92 95   MCH 29.1 29.6 28.8   MCHC 31.1* 32.1 30.4*     BNP:  Recent Labs   Lab 05/23/24  0940   BNP 1,425*     CMP:  Recent Labs   Lab 05/23/24  0940 05/27/24  1305 05/27/24  1636 05/28/24  0559   * 122* 131* 134*   CALCIUM 8.8 8.8 9.1 8.8   ALBUMIN 2.6* 2.8* 2.8*  --    PROT 7.4 7.3 8.1  --     140 142 141   K 3.2* 3.0* 3.1* 3.1*   CO2 27 28 29 30*   CL 98 95 97 98   BUN 59* 68* 66* 65*   CREATININE 6.4* 6.5* 6.6* 6.3*   ALKPHOS 115 107 112  --    ALT 28 33 30  --    AST 40 28 27  --    BILITOT 0.2 0.2 0.2  --       Coagulation:   Recent Labs   Lab 05/27/24  1305 05/27/24  1636 05/28/24  0559   INR 4.3* 4.2* 3.4*   APTT 56.0* 51.8* 49.3*     LDH:  Recent Labs   Lab 05/28/24  0559   *     Microbiology:  Microbiology Results (last 7 days)       ** No results found  for the last 168 hours. **            I have reviewed all pertinent labs within the past 24 hours.    Estimated Creatinine Clearance: 14.1 mL/min (A) (based on SCr of 6.3 mg/dL (H)).    Diagnostic Results:  I have reviewed all pertinent imaging results/findings within the past 24 hours.  Assessment and Plan:     Mr. Radha Abbott is a very pleasant 65 yo male with stage D HFrEF, ICMP with previous admission for ADHF/CS. He did undergo HM3 placement placed by Dr Washington on 12/1/23. Lengthy post op course complicated by vasoplegia (requiring Giaprezza), debility, malnutrition/impaired swallowing, and renal failure requiring HD(since weaned off). Once medically stable, he was transferred to Rehab for further PT/OT/ST. Of note, pt left with permacath still in place. He has done really well in rehab and now has continued to progress well at home. He is on a very high dose of Bumex 4 mg twice daily. VAD speed is at 5000 rpm. No VAD alarms noted on interrogation occasional PI events . He is currenlty on 6 week therapy for Enterococcus bacteremia with ampicillin and rocephin ending 5/30/24. He presented to the local ED with two falls. He says he hasn't been able to move his R foot well for two days and also can't feel well in it. He has been dragging the R foot when walking which led to mechanical falls and then he went to the ED. He also says weight went up by 2 pounds from 181 to 181 lbs and he got leg edema. No CP, SOB or LH reported. No other symptoms. CT head in OSH is unremarkable so is xray foot. He was sent for neuro work up. His Cr is elevated as well.     * Weakness of foot, right  R foot motor and sensory weakness x 2 days  2 mechanical falls  CT head and xray foot unremarkable  Vascular neurology consulted  Awaiting final recs  PT/OT consult     LVAD (left ventricular assist device) present  Procedure: Device Interrogation Including analysis of device parameters  Current Settings: Ventricular Assist Device  Review  of device function is stable    Cr is elevated, leg edema present and weight went up by 2 lbs  Home regimen is 4 bid po bumex with prn metolazone  Continue IV bumex 2 mg bid   Monitor I/Os with daily weights   Obtain echo once euvolemic         5/28/2024     4:00 AM 5/28/2024     1:06 AM 5/23/2024    11:40 AM 5/1/2024     3:27 PM 5/1/2024    12:23 PM 5/1/2024     8:25 AM 5/1/2024     5:02 AM   TXP LVAD INTERROGATIONS   Type HeartMate3 HeartMate3  HeartMate3 HeartMate3 HeartMate3 HeartMate3   Flow 3.8 3.9  4 4.4 4.1 3.8   Speed 5000 5000  5000 5000 5000 5000   PI 5.7 5.9  4.4 4.5 4 4.5   Power (Carrington) 3.4 3.4  3.5 3.5 3.4 3.4   LSL 4600 4600  4600 4600 4600 4600   Pulsatility Intermittent pulse Pulse Intermittent pulse Intermittent pulse Pulse Pulse Pulse         Anticoagulant long-term use  Had epistaxis 2 days ago   INR elevated at 3.4   Hold coumadin for now    Bacteremia  On 6 week course of ampicillin and rocehpin till 5/30/24  WBC normal       Acute on chronic combined systolic and diastolic heart failure  - plan as above     Ventricular tachycardia  Cont home radha Duran, ALETHA  Heart Transplant  [facility] - Cardiology Stepdown

## 2024-05-28 NOTE — PLAN OF CARE
AAOX4,VSS,O2 sats>96% on RA.Plan of care discussed with patient.Patient has no complaints of pain/SOB. Discussed medications and care. Patient has no questions at this time.Pt visualised and stable.Call light within reach.Pt resting,bed at lowest position.Pt's family by the bedside.

## 2024-05-28 NOTE — PLAN OF CARE
Patient seen and examined w/staff this morning. Exam fairly convincing for R common peroneal neuropathy. History, with gradual onset/progression of symptoms, is also more compelling for peripheral over central cause. Cannot obtain MRI due to LVAD. INR this morning is 3.8. LLD 77.8 and TSH 0.842 (5/23/24), A1C 7.4% (4/17/24). No evidence of endocarditis on echo from 4/18. Afebrile. Remains on amiodarone for VTach, Coumadin held due to recent epistaxis.    -continue to address stroke risk-factors: diabetes, HTN, HLD  ->add Lipitor 40mg QD  -continue Coumadin as able  -AFO for foot drop  -neuropathy labs: can be followed up outpatient  ->B1, B6, B9/HC, B12/MMA, RPR, SPEP/LUKAS (ordered)  -outpatient follow-up with neurology  ->can consider [facility] RLE outpatient  -vascular neurology will sign-off   -contact us with further questions/concerns

## 2024-05-28 NOTE — ASSESSMENT & PLAN NOTE
65 y/o male with c/o right foot pain, weakness and numbness for 2-3 days that has resulted in him falling x 2. CT scan head with no acute process seen. Out of window for thrombolytics plus on coumadin that is supra therapeutic.  NO deficits to suggest LVO so no intervention and no vessel imaging due to IVÁN.   LDL 77.8 not on statin  On coumadin and supra therapeutic  Recommend therapy with PT/OT  Has neuropathy and is on gabapentin  Unable to get MRI brain.  Further recommendation once evaluated by Vascular neurologist.

## 2024-05-28 NOTE — ASSESSMENT & PLAN NOTE
- Admitted for Right leg weakness and falls  - Implant Date: 12/1/2023  - Speed: 5000  - Interval History was obtained from team member during rounding today.  - VAD/MAKAYLA teaching was performed with patient.  - Mobilization/Physical Therapy is ongoing.  - Anticoagulation: Coumadin - Held for elevated INR  - INR: 3.4  - Duiresis: Bumex  - BUN: 65  - Creat: 6.5  - LDH: 358  - Vascular neurology consulted. No emergent intervention.  Unable to obtain MRI secondary to LVAD.  Follow up as an outpatient    More than 50 percent of the care dominated counseling and coordinating care with different team members. The VAD was interrogated and no significant findings were found in the history. All these findings are documented in the note above.

## 2024-05-28 NOTE — ASSESSMENT & PLAN NOTE
R foot motor and sensory weakness x 2 days  2 mechanical falls  CT head and xray foot unremarkable  Vascular neurology consulted  Awaiting final recs  PT/OT consult

## 2024-05-28 NOTE — ASSESSMENT & PLAN NOTE
Procedure: Device Interrogation Including analysis of device parameters  Current Settings: Ventricular Assist Device  Review of device function is stable    Cr is elevated, leg edema present and weight went up by 2 lbs  Home regimen is 4 bid po bumex with prn metolazone  Continue IV bumex 2 mg bid   Monitor I/Os with daily weights   Obtain echo once euvolemic         5/28/2024     4:00 AM 5/28/2024     1:06 AM 5/23/2024    11:40 AM 5/1/2024     3:27 PM 5/1/2024    12:23 PM 5/1/2024     8:25 AM 5/1/2024     5:02 AM   TXP LVAD INTERROGATIONS   Type HeartMate3 HeartMate3  HeartMate3 HeartMate3 HeartMate3 HeartMate3   Flow 3.8 3.9  4 4.4 4.1 3.8   Speed 5000 5000  5000 5000 5000 5000   PI 5.7 5.9  4.4 4.5 4 4.5   Power (Carrington) 3.4 3.4  3.5 3.5 3.4 3.4   LSL 4600 4600  4600 4600 4600 4600   Pulsatility Intermittent pulse Pulse Intermittent pulse Intermittent pulse Pulse Pulse Pulse

## 2024-05-28 NOTE — H&P
[facility] - Cardiology Stepdown  Heart Transplant  H&P    Patient Name: Radha Abbott  MRN: [id number]  Admission Date: 5/28/2024  Attending Physician: Sajan Hurley, *  Primary Care Provider: Vasu Kong MD  Principal Problem:Weakness of foot, right    Subjective:     History of Present Illness:  Mr. Radha Abbott is a very pleasant 63 yo male with stage D HFrEF, ICMP with previous admission for ADHF/CS. He did undergo HM3 placement placed by Dr Washington on 12/1/23. Lengthy post op course complicated by vasoplegia (requiring Giaprezza), debility, malnutrition/impaired swallowing, and renal failure requiring HD(since weaned off). Once medically stable, he was transferred to Rehab for further PT/OT/ST. Of note, pt left with permacath still in place. He has done really well in rehab and now has continued to progress well at home. He is on a very high dose of Bumex 4 mg twice daily. VAD speed is at 5000 rpm. No VAD alarms noted on interrogation occasional PI events . He is currenlty on 6 week therapy for Enterococcus bacteremia with ampicillin and rocephin ending 5/30/24. He presented to the local ED with two falls. He says he hasn't been able to move his R foot well for two days and also can't feel well in it. He has been dragging the R foot when walking which led to mechanical falls and then he went to the ED. He also says weight went up by 2 pounds from 181 to 181 lbs and he got leg edema. No CP, SOB or LH reported. No other symptoms. CT head in OSH is unremarkable so is xray foot. He was sent for neuro work up. His Cr is elevated as well.     Past Medical History:   Diagnosis Date    CAD (coronary artery disease)     CHF (congestive heart failure)     Diabetes mellitus     HFrEF (heart failure with reduced ejection fraction)     ICD (implantable cardioverter-defibrillator) in place     MI, old        Past Surgical History:   Procedure Laterality Date    ANGIOPLASTY-VENOUS ARTERY Right 12/1/2023     Procedure: ANGIOPLASTY-VENOUS ARTERY, RIGHT FEMORAL;  Surgeon: Yuri Washington MD;  Location: [facility] OR 2ND FLR;  Service: Cardiovascular;  Laterality: Right;    AORTIC VALVULOPLASTY N/A 12/1/2023    Procedure: REPAIR, AORTIC VALVE;  Surgeon: Yuri Washington MD;  Location: [facility] OR 2ND FLR;  Service: Cardiovascular;  Laterality: N/A;    CARDIAC SURGERY      CLOSURE N/A 12/1/2023    Procedure: CLOSURE, TEMPORARY;  Surgeon: Yuri Washington MD;  Location: [facility] OR [facility] FLR;  Service: Cardiovascular;  Laterality: N/A;    DRAINAGE OF PLEURAL EFFUSION  12/4/2023    Procedure: DRAINAGE, PLEURAL EFFUSION;  Surgeon: Yuri Washington MD;  [facility];  Service: Cardiovascular;;    INSERTION OF GRAFT TO PERICARDIUM  12/4/2023    Procedure: INSERTION, GRAFT, PERICARDIUM;  Surgeon: Yuri Washington MD;  [facility];  Service: Cardiovascular;;    INSERTION OF INTRA-AORTIC BALLOON ASSIST DEVICE Right 11/21/2023    Procedure: INSERTION, INTRA-AORTIC BALLOON PUMP;  Surgeon: Finn Cohn MD;  Location: [facility] CATH LAB;  Service: Cardiology;  Laterality: Right;    LEFT VENTRICULAR ASSIST DEVICE Left 12/1/2023    Procedure: INSERTION-LEFT VENTRICULAR ASSIST DEVICE;  Surgeon: Yuri Washington MD;  [facility];  Service: Cardiovascular;  Laterality: Left;  REDO STERNOTOMY - REDO SAW NEEDED FOR CASE    LYSIS OF ADHESIONS  12/1/2023    Procedure: LYSIS, ADHESIONS;  Surgeon: Yuri Washington MD;  [facility];  Service: Cardiovascular;;    PLACEMENT OF SWAN ROLANDO CATHETER WITH IMAGING GUIDANCE  11/20/2023    Procedure: INSERTION, CATHETER, SWAN-ROLANDO, WITH IMAGING GUIDANCE;  Surgeon: Sajan Hurley MD;  Location: [facility] CATH LAB;  Service: Cardiology;;    REMOVAL OF TUNNELED CENTRAL VENOUS CATHETER (CVC) N/A 3/1/2024    Procedure: REMOVAL, CATHETER, CENTRAL VENOUS, TUNNELED;  Surgeon: Seble Aguilar MD;  Location: [facility] CATH LAB;  Service: Interventional Nephrology;  Laterality: N/A;     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 12/1/2023    Procedure: REPAIR, ANEURYSM, ARTERY, FEMORAL;  Surgeon: Yuri Washington MD;  Location: [address];  Service: Cardiovascular;  Laterality: Right;  Right Femoral Artery Repair    RIGHT HEART CATHETERIZATION Right 10/10/2023    Procedure: INSERTION, CATHETER, RIGHT HEART;  Surgeon: Bin Gandhi MD;  Location: [facility] CATH LAB;  Service: Cardiology;  Laterality: Right;    RIGHT HEART CATHETERIZATION Right 10/13/2023    Procedure: INSERTION, CATHETER, RIGHT HEART;  Surgeon: Walter Mcintyre MD;  Location: [facility] CATH LAB;  Service: Cardiology;  Laterality: Right;    RIGHT HEART CATHETERIZATION  11/13/2023    RIGHT HEART CATHETERIZATION Right 11/13/2023    Procedure: INSERTION, CATHETER, RIGHT HEART;  Surgeon: Juventino Bermudez Jr., MD;  Location: [facility] CATH LAB;  Service: Cardiology;  Laterality: Right;    RIGHT HEART CATHETERIZATION Right 11/20/2023    Procedure: INSERTION, CATHETER, RIGHT HEART;  Surgeon: Sajan Hurley MD;  Location: [facility] CATH LAB;  Service: Cardiology;  Laterality: Right;    RIGHT HEART CATHETERIZATION Right 1/22/2024    Procedure: INSERTION, CATHETER, RIGHT HEART;  Surgeon: Brayan Ocampo MD;  Location: [facility] CATH LAB;  Service: Cardiology;  Laterality: Right;    STERNAL WOUND CLOSURE N/A 12/4/2023    Procedure: CLOSURE, WOUND, STERNUM;  Surgeon: Yuri Washington MD;  Location: [address];  Service: Cardiovascular;  Laterality: N/A;    STERNOTOMY N/A 12/1/2023    Procedure: STERNOTOMY, REDO;  Surgeon: Yuri Washington MD;  Location: [address];  Service: Cardiovascular;  Laterality: N/A;    VALVULOPLASTY, MITRAL VALVE N/A 12/1/2023    Procedure: VALVULOPLASTY, MITRAL VALVE;  Surgeon: Yuri Washington MD;  Location: [address];  Service: Cardiovascular;  Laterality: N/A;       Review of patient's allergies indicates:  No Known Allergies    Current Facility-Administered Medications   Medication    amiodarone tablet  400 mg    ampicillin (OMNIPEN) 2 g in sodium chloride 0.9 % 100 mL IVPB (MB+)    bumetanide injection 2 mg    cefTRIAXone (ROCEPHIN) 2 g in dextrose 5 % in water (D5W) 100 mL IVPB (MB+)    famotidine tablet 20 mg    fluticasone propionate 50 mcg/actuation nasal spray 100 mcg    gabapentin capsule 100 mg    hydrALAZINE tablet 100 mg    magnesium oxide tablet 400 mg    omega-3 acid ethyl esters capsule 2 g    potassium chloride SA CR tablet 40 mEq    senna-docusate 8.6-50 mg per tablet 2 tablet    trazodone split tablet 25 mg    venlafaxine tablet 37.5 mg    vitamin D 1000 units tablet 1,000 Units     Family History    None       Tobacco Use    Smoking status: Former     Current packs/day: 0.50     Types: Cigarettes    Smokeless tobacco: Not on file   Substance and Sexual Activity    Alcohol use: Yes     Comment: rarely    Drug use: No    Sexual activity: Not Currently     Partners: Female     Review of Systems   Constitutional:  Positive for activity change, fatigue and unexpected weight change.   Respiratory:  Negative for chest tightness and shortness of breath.    Neurological:  Positive for numbness.     Objective:     Vital Signs (Most Recent):  Temp: 98.4 °F (36.9 °C) (05/28/24 0043)  Pulse: 85 (05/28/24 0155)  Resp: 14 (05/28/24 0043)  BP: (!) 98/0 (05/28/24 0219)  SpO2: 95 % (05/28/24 0043) Vital Signs (24h Range):  Temp:  [98.4 °F (36.9 °C)] 98.4 °F (36.9 °C)  Pulse:  [84-87] 85  Resp:  [14-22] 14  SpO2:  [95 %-98 %] 95 %  BP: ()/(0-81) 98/0     No data found.  There is no height or weight on file to calculate BMI.    No intake or output data in the 24 hours ending 05/28/24 0228       Physical Exam  HENT:      Head: Normocephalic.      Nose: Nose normal.   Eyes:      Pupils: Pupils are equal, round, and reactive to light.   Cardiovascular:      Rate and Rhythm: Normal rate.      Comments: VAD hum present    Musculoskeletal:      Right lower leg: Edema present.      Left lower leg: Edema present.  "  Neurological:      General: No focal deficit present.      Mental Status: He is alert.      Comments: R foot with sensory 0/5 and motor loss 2/5 on exam   Psychiatric:         Mood and Affect: Mood normal.            Significant Labs:  CBC:  Recent Labs   Lab 05/23/24  0940 05/27/24  1305 05/27/24  1636   WBC 5.79 5.36 5.31   RBC 2.20* 2.23* 2.33*   HGB 6.4* 6.5* 6.9*   HCT 20.3* 20.9* 21.5*    254 242   MCV 92 94 92   MCH 29.1 29.1 29.6   MCHC 31.5* 31.1* 32.1     BNP:  Recent Labs   Lab 05/23/24  0940   BNP 1,425*     CMP:  Recent Labs   Lab 05/23/24  0940 05/27/24  1305 05/27/24  1636   * 122* 131*   CALCIUM 8.8 8.8 9.1   ALBUMIN 2.6* 2.8* 2.8*   PROT 7.4 7.3 8.1    140 142   K 3.2* 3.0* 3.1*   CO2 27 28 29   CL 98 95 97   BUN 59* 68* 66*   CREATININE 6.4* 6.5* 6.6*   ALKPHOS 115 107 112   ALT 28 33 30   AST 40 28 27   BILITOT 0.2 0.2 0.2      Coagulation:   Recent Labs   Lab 05/23/24  0940 05/27/24  1305 05/27/24  1636   INR 3.7* 4.3* 4.2*   APTT  --  56.0* 51.8*     LDH:  No results for input(s): "LDH" in the last 72 hours.  Microbiology:  Microbiology Results (last 7 days)       ** No results found for the last 168 hours. **            Cardiac Markers: No results for input(s): "CKMB", "TROPONINT", "MYOGLOBIN" in the last 72 hours.  Coagulation:   Recent Labs   Lab 05/27/24  1636   INR 4.2*   APTT 51.8*     Prealbumin: No results for input(s): "PREALBUMIN" in the last 72 hours.  I have reviewed all pertinent labs within the past 24 hours.    Diagnostic Results:        Assessment/Plan:     * Weakness of foot, right  R foot motor and sensory weakness x 2 days  2 mechanical falls  CT head and xray foot unremarkable  Vascular neurology consulted    LVAD (left ventricular assist device) present  Procedure: Device Interrogation Including analysis of device parameters  Current Settings: Ventricular Assist Device  Review of device function is stable    Cr is elevated, leg edema present and weight " went up by 2 lbs  Home regimen is 4 bid po bumex with prn metolazone  Will start IV bumex 2 mg bid here and assess volume status closely daily        5/28/2024     1:06 AM 5/23/2024    11:40 AM 5/1/2024     3:27 PM 5/1/2024    12:23 PM 5/1/2024     8:25 AM 5/1/2024     5:02 AM 4/30/2024    11:51 PM   TXP LVAD INTERROGATIONS   Type HeartMate3  HeartMate3 HeartMate3 HeartMate3 HeartMate3 HeartMate3   Flow 3.9  4 4.4 4.1 3.8 4.1   Speed 5000  5000 5000 5000 5000 5000   PI 5.9  4.4 4.5 4 4.5 3.8   Power (Carrington) 3.4  3.5 3.5 3.4 3.4 3.4   LSL 4600  4600 4600 4600 4600 4600   Pulsatility  Intermittent pulse Intermittent pulse Pulse Pulse Pulse Pulse         Anticoagulant long-term use  Had epistaxis 2 days ago   INR elevated  Hold coumadin for now    Bacteremia  On 6 week course of ampicillin and rocehpin till 5/30/24  Cont the same    Ventricular tachycardia  Cont home radha Manzanares MD  Heart Transplant  [facility] - Cardiology Stepdown

## 2024-05-28 NOTE — HPI
Mr. Radha Abbott is a very pleasant 65 yo male with stage D HFrEF, ICMP with previous admission for ADHF/CS. He did undergo HM3 placement placed by Dr Washington on 12/1/23. Lengthy post op course complicated by vasoplegia (requiring Giaprezza), debility, malnutrition/impaired swallowing, and renal failure requiring HD(since weaned off). Once medically stable, he was transferred to Rehab for further PT/OT/ST. Of note, pt left with permacath still in place. He has done really well in rehab and now has continued to progress well at home. He is on a very high dose of Bumex 4 mg twice daily. VAD speed is at 5000 rpm. No VAD alarms noted on interrogation occasional PI events . He is currenlty on 6 week therapy for Enterococcus bacteremia with ampicillin and rocephin ending 5/30/24. He presented to the local ED with two falls. He says he hasn't been able to move his R foot well for two days and also can't feel well in it. He has been dragging the R foot when walking which led to mechanical falls and then he went to the ED. He also says weight went up by 2 pounds from 181 to 181 lbs and he got leg edema. No CP, SOB or LH reported. No other symptoms. CT head in OSH is unremarkable so is xray foot. He was sent for neuro work up. His Cr is elevated as well.

## 2024-05-28 NOTE — PROGRESS NOTES
[facility] - Cardiology Stepdown  Cardiothoracic Surgery  Evaluation and Management/VAD interrogation       Patient Name: Radha Abbott  MRN: [id number]  Admission Date: 5/28/2024  Hospital Length of Stay: 0 days  Code Status: Full Code   Attending Physician: Sajan Hurley, *   Referring Provider: Christa Manning DO  Principal Problem:Weakness of foot, right    Subjective:     Post-Op Info:  * No surgery found *         Subjective:     Post-Op Info:  * No surgery found *          Reason for Visit:  Patient is seen in follow up management of LVAD Management     Interval History: Admitted for falls and right leg weakness. Vascular neurology consulted.  Unable to obtain MRI.     Medications:  Continuous Infusions:  Scheduled Meds:   amiodarone  400 mg Oral Daily    ampicillin (OMNIPEN) 2 g in sodium chloride 0.9 % 100 mL IVPB (MB+)  2 g Intravenous Q12H    atorvastatin  40 mg Oral QHS    bumetanide  2 mg Intravenous BID    cefTRIAXone (ROCEPHIN) 2 g in dextrose 5 % in water (D5W) 100 mL IVPB (MB+)  2 g Intravenous Q12H    famotidine  20 mg Oral Daily    fluticasone propionate  2 spray Each Nostril Daily    gabapentin  100 mg Oral BID    hydrALAZINE  100 mg Oral Q8H    magnesium oxide  400 mg Oral Daily    omega-3 acid ethyl esters  2 g Oral BID    traZODone  25 mg Oral QHS    venlafaxine  37.5 mg Oral Daily    vitamin D  1,000 Units Oral Daily     PRN Meds:  Current Facility-Administered Medications:     senna-docusate 8.6-50 mg, 2 tablet, Oral, Daily PRN     Objective:     Vital Signs (Most Recent):  Temp: 98.2 °F (36.8 °C) (05/28/24 0744)  Pulse: 89 (05/28/24 0458)  Resp: 18 (05/28/24 0744)  BP: (!) 106/0 (05/28/24 1225)  SpO2: 96 % (05/28/24 0445) Vital Signs (24h Range):  Temp:  [98.2 °F (36.8 °C)-98.6 °F (37 °C)] 98.2 °F (36.8 °C)  Pulse:  [84-90] 89  Resp:  [14-22] 18  SpO2:  [80 %-98 %] 96 %  BP: ()/(0-81) 106/0       Intake/Output Summary (Last 24 hours) at 5/28/2024 1355  Last data filed at  5/28/2024 0909  Gross per 24 hour   Intake 420 ml   Output 475 ml   Net -55 ml       Physical Exam  HENT:      Head: Normocephalic.   Eyes:      Extraocular Movements: Extraocular movements intact.   Cardiovascular:      Rate and Rhythm: Normal rate and regular rhythm.      Comments: LVAD hum is smooth  Pulmonary:      Effort: Pulmonary effort is normal.      Breath sounds: Normal breath sounds.   Abdominal:      General: Abdomen is flat.      Palpations: Abdomen is soft.   Skin:     General: Skin is warm and dry.   Neurological:      General: No focal deficit present.         Significant Labs:  BMP:   Recent Labs   Lab 05/28/24  1117 05/28/24  1244   * 210*   * 140   K 3.9 4.2    99   CO2 25 26   BUN 59* 63*   CREATININE 6.0* 6.5*   CALCIUM 8.7 9.3   MG 1.7  --      CBC:   Recent Labs   Lab 05/28/24  1244   WBC 5.44   RBC 2.39*   HGB 6.9*   HCT 22.8*      MCV 95   MCH 28.9   MCHC 30.3*     CMP:   Recent Labs   Lab 05/27/24  1636 05/28/24  0559 05/28/24  1244   *   < > 210*   CALCIUM 9.1   < > 9.3   ALBUMIN 2.8*  --   --    PROT 8.1  --   --       < > 140   K 3.1*   < > 4.2   CO2 29   < > 26   CL 97   < > 99   BUN 66*   < > 63*   CREATININE 6.6*   < > 6.5*   ALKPHOS 112  --   --    ALT 30  --   --    AST 27  --   --    BILITOT 0.2  --   --     < > = values in this interval not displayed.     Coagulation:   Recent Labs   Lab 05/28/24  0559   INR 3.4*   APTT 49.3*       Significant Diagnostics:  I have reviewed and interpreted all pertinent imaging results/findings within the past 24 hours.    Procedure: Device Interrogation Including analysis of device parameters  Current Settings: Ventricular Assist Device  Review of device function is stable      5/28/2024    12:26 PM 5/28/2024     7:44 AM 5/28/2024     4:00 AM 5/28/2024     1:06 AM 5/23/2024    11:40 AM 5/1/2024     3:27 PM 5/1/2024    12:23 PM   TXP LVAD INTERROGATIONS   Type HeartMate3 HeartMate3 HeartMate3 HeartMate3   HeartMate3 HeartMate3   Flow 3.6 4 3.8 3.9  4 4.4   Speed 5050 5000 5000 5000  5000 5000   PI 7.2 4.4 5.7 5.9  4.4 4.5   Power ([place]) 3.4 3.4 3.4 3.4  3.5 3.5   LSL 4600 4600 4600 4600  4600 4600   Pulsatility Pulse Pulse Intermittent pulse Pulse Intermittent pulse Intermittent pulse Pulse       Assessment/Plan:     LVAD (left ventricular assist device) present  - Admitted for Right leg weakness and falls  - Implant Date: 12/1/2023  - Speed: 5000  - Interval History was obtained from team member during rounding today.  - VAD/MAKAYLA teaching was performed with patient.  - Mobilization/Physical Therapy is ongoing.  - Anticoagulation: Coumadin - Held for elevated INR  - INR: 3.4  - Duiresis: Bumex  - BUN: 65  - Creat: 6.5  - LDH: 358  - Vascular neurology consulted. No emergent intervention.  Unable to obtain MRI secondary to LVAD.  Follow up as an outpatient    More than 50 percent of the care dominated counseling and coordinating care with different team members. The VAD was interrogated and no significant findings were found in the history. All these findings are documented in the note above.          Jackie Ruff NP  Cardiothoracic Surgery  [facility] - Cardiology Stepdown

## 2024-05-28 NOTE — HPI
65 y/o LVAD patient presented to the ED at [facility] for 2-3 day history of right foot weakness and numbness and pain. He had 2 falls since this started. Wife also reports intermittent slurred speech and confusion. NIH stroke scale 2, out of the thrombolytic window also not a candidate due to an INR 4.2.   CT head with no acute process seen.   Patient transferred to [facility] for LVAD and closer monitoring and evaluation    Patient was recently  admitted to the hospital with a 4 day history of fatigue and fevers on 4-17-24 till 5-1-24. He denied any other symptoms. COVID test was positive.  Blood cultures were positive for E faecalis.  Source of his bacteremia was unclear.  LVAD DLES was clean and dry.  TTE was negative for vegetation.  He was started on IV ampicillin and IV ceftriaxone for 6 weeks empirically.  He cleared his blood cultures.  A tunneled line was placed in his right upper chest and he was discharged home.     Patient is unable to get MRI due to LVAD.    Further recommendation once evaluated by Vascular neurologist.     Off note patient had neuropathic pain right foot treated with gabapentin 12-23-23

## 2024-05-28 NOTE — PT/OT/SLP EVAL
Occupational Therapy   Co-Evaluation and Tx    Name: Radha Abbott  MRN: [id number]  Admitting Diagnosis: Weakness of foot, right  Recent Surgery: * No surgery found *      Recommendations:     Discharge Recommendations: Low Intensity Therapy  Discharge Equipment Recommendations:  walker, rolling  Barriers to discharge:  None    Assessment:     Radha Abbott is a 64 y.o. male with a medical diagnosis of Weakness of foot, right.  He presents with the following performance deficits affecting function: weakness, impaired endurance, impaired self care skills, impaired functional mobility, gait instability, impaired balance, pain, decreased lower extremity function, decreased coordination, decreased ROM, impaired cardiopulmonary response to activity.  Pt tolerated session well, which focused on evaluating pt's current functional status 2/2 recent admit from fall. Pt endorsing R foot pain, sensation deficits, and weakness. PTA, he was mobilizing with no AD and only PRN use of Rollator until ~1 week ago when generalized fatigue and R foot weakness began. Pt reporting some issues with R digits sensation deficits, but no  coordination or strength deficits noted in comparison to LUE on exam. He was able to perform all bed mobility well, but required increased assistance for all OOB and utilizing RW 2/2 aforementioned deficits. Pt would benefit from continued skilled acute OT services in order to maximize (I) and with ADLs and functional mobility to ensure safe return to PLOF in the least restrictive environment. OT recommending low intensity therapy once pt is medically appropriate for d/c.         Rehab Prognosis: Good; patient would benefit from acute skilled OT services to address these deficits and reach maximum level of function.       Plan:     Patient to be seen 3 x/week to address the above listed problems via self-care/home management, therapeutic activities, therapeutic exercises, neuromuscular re-education  Plan of Care  "Expires: 06/27/24  Plan of Care Reviewed with: patient, spouse    Subjective   "I wasn't ucsing anything before this"  Chief Complaint: R foot pain   Patient/Family Comments/goals: return to PLOF     Occupational Profile:  Living Environment: lives with spouse in [facility] with felicitas MEJIA. Pt has t/s combo and BSC over toilet.   Previous level of function: Pt (I) with no AD, however was utilizing Rollator ~1 week prior 2/2 increased fatigue and R foot weakness.   Roles and Routines: was driving   Equipment Used at Home: rollator, bedside commode, shower chair  Assistance upon Discharge: Wife     Pain/Comfort:  Pain Rating 1: 7/10  Location - Side 1: Right  Location 1: foot  Pain Addressed 1: Distraction, Reposition  Pain Rating Post-Intervention 1: 7/10    Patients cultural, spiritual, Restorationism conflicts given the current situation: no    Objective:     Communicated with: RN prior to session.  Patient found HOB elevated with LVAD upon OT entry to room.    General Precautions: Standard, contact  Orthopedic Precautions: N/A  Braces: N/A  Respiratory Status: Room air    Occupational Performance:    Bed Mobility:    Patient completed Scooting/Bridging with modified independence  Patient completed Supine to Sit with modified independence   HOB elevated     Functional Mobility/Transfers:  Patient completed Sit <> Stand Transfer with contact guard assistance  with  rolling walker   Patient completed Bed <> Chair Transfer using Step Transfer technique with contact guard assistance with rolling walker   Required cueing for safe RW management   Functional Mobility: Pt participated in room mobility to simulate home and community distances for 10 ft with CGA and RW.   Pt demo slow pace (wife reporting slower than PLOF) and decreased R foot clearance (see PT note for further details)     Activities of Daily Living:  Lower Body Dressing: presents dressed in personal pants; SPV for doffing/donning sock while sitting EOB utilizing " Fig 4 tech    Toileting: denied needs a this time; pt used prior to session . BSC in room.     Cognitive/Visual Perceptual:  Cognitive/Psychosocial Skills:     -       Oriented to: Person, Place, Time, and Situation   -       Follows Commands/attention:Follows multistep  commands  -       Communication: clear/fluent  -       Memory: No Deficits noted  Visual/Perceptual:      -denies vision changes       Physical Exam:  Balance:    -           Static sitting balance: SPV  - Dynamic sitting balance: SPV  - Static standing balance: CGA with RW  - Dynamic standing balance:  CGA with RGW   Postural examination/scapula alignment:    -       No postural abnormalities identified  Skin integrity: Visible skin intact  Edema:  None noted  Sensation:    -       Intact  Motor Planning:    -       WFL   Dominant hand:    -       R  Upper Extremity Range of Motion:     -       Right Upper Extremity: WFL  -       Left Upper Extremity: WFL  Upper Extremity Strength:    -       Right Upper Extremity: WFL  -       Left Upper Extremity: WFL   Strength:    -       Right Upper Extremity: WFL  -       Left Upper Extremity: WFL  Fine Motor Coordination:    -       Intact  Left hand, finger to nose and Right hand, finger to nose     AMPAC 6 Click ADL:  AMPAC Total Score: 20    Treatment & Education:   Pt and spouse educated on:   Role of OT, POC, and d/c planning.   Safe transfer techniques and proper body mechanics for fall prevention and improved independence with functional transfers    Left RW in room to promote progressive mobility and prevent deconditioning    Importance of OOB activities to increase endurance and tolerance for increased participation in daily ADLs.   Left sitting up in bedside chair   Utilizing the call bell to request for assistance with all functional mobility to ensure safety during hospital stay.       Pt and spouse verbalized understanding and all questions were addressed within the scope of OT.     Patient  left up in chair with all lines intact, call button in reach, RN notified, and spouse present    GOALS:   Multidisciplinary Problems       Occupational Therapy Goals          Problem: Occupational Therapy    Goal Priority Disciplines Outcome Interventions   Occupational Therapy Goal     OT, PT/OT Progressing    Description: Goals to be met by: 6/11/2024     Patient will increase functional independence with ADLs by performing:    UE Dressing with Modified [place].  LE Dressing with Modified [place].  Grooming while standing at sink with Modified [place].  Toileting from toilet with Modified [place] for hygiene and clothing management.   Step transfer with Modified [place]  Toilet transfer to toilet with Modified [place].  Upper extremity exercise program x12 reps per handout, with independence.   Pt will perform functional mobility for household and community distances with Mod I and LRAD required for independence in occupations of choice.                          History:     Past Medical History:   Diagnosis Date    CAD (coronary artery disease)     CHF (congestive heart failure)     Diabetes mellitus     HFrEF (heart failure with reduced ejection fraction)     ICD (implantable cardioverter-defibrillator) in place     MI, old          Past Surgical History:   Procedure Laterality Date    ANGIOPLASTY-VENOUS ARTERY Right 12/1/2023    Procedure: ANGIOPLASTY-VENOUS ARTERY, RIGHT FEMORAL;  Surgeon: Yuri Washington MD;  Location: [address];  Service: Cardiovascular;  Laterality: Right;    AORTIC VALVULOPLASTY N/A 12/1/2023    Procedure: REPAIR, AORTIC VALVE;  Surgeon: Yuri Washington MD;  Location: [address];  Service: Cardiovascular;  Laterality: N/A;    CARDIAC SURGERY      CLOSURE N/A 12/1/2023    Procedure: CLOSURE, TEMPORARY;  Surgeon: Yuri Washington MD;  Location: [address];  Service: Cardiovascular;  Laterality: N/A;    DRAINAGE OF PLEURAL EFFUSION  12/4/2023     Procedure: DRAINAGE, PLEURAL EFFUSION;  Surgeon: Yuri Washington MD;  Location: [facility];  Service: Cardiovascular;;    INSERTION OF GRAFT TO PERICARDIUM  12/4/2023    Procedure: INSERTION, GRAFT, PERICARDIUM;  Surgeon: Yuri Washington MD;  Location: [facility] OR [facility];  Service: Cardiovascular;;    INSERTION OF INTRA-AORTIC BALLOON ASSIST DEVICE Right 11/21/2023    Procedure: INSERTION, INTRA-AORTIC BALLOON PUMP;  Surgeon: Finn Cohn MD;  Location: [facility] CATH LAB;  Service: Cardiology;  Laterality: Right;    LEFT VENTRICULAR ASSIST DEVICE Left 12/1/2023    Procedure: INSERTION-LEFT VENTRICULAR ASSIST DEVICE;  Surgeon: Yuri Washington MD;  [facility];  Service: Cardiovascular;  Laterality: Left;  REDO STERNOTOMY - REDO SAW NEEDED FOR CASE    LYSIS OF ADHESIONS  12/1/2023    Procedure: LYSIS, ADHESIONS;  Surgeon: uYri Washington MD;  [facility];  Service: Cardiovascular;;    PLACEMENT OF SWAN ROLANDO CATHETER WITH IMAGING GUIDANCE  11/20/2023    Procedure: INSERTION, CATHETER, SWAN-ROLANDO, WITH IMAGING GUIDANCE;  Surgeon: Sajan Hurley MD;  Location: [facility] CATH LAB;  Service: Cardiology;;    REMOVAL OF TUNNELED CENTRAL VENOUS CATHETER (CVC) N/A 3/1/2024    Procedure: REMOVAL, CATHETER, CENTRAL VENOUS, TUNNELED;  Surgeon: Seble Aguilar MD;  Location: 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;  Service: Cardiovascular;  Laterality: Right;  Right Femoral Artery Repair    RIGHT HEART CATHETERIZATION Right 10/10/2023    Procedure: INSERTION, CATHETER, RIGHT HEART;  Surgeon: Bin Gandhi MD;  Location: [facility] CATH LAB;  Service: Cardiology;  Laterality: Right;    RIGHT HEART CATHETERIZATION Right 10/13/2023    Procedure: INSERTION, CATHETER, RIGHT HEART;  Surgeon: Walter Mcintyre MD;   Location: [facility] CATH LAB;  Service: Cardiology;  Laterality: Right;    RIGHT HEART CATHETERIZATION  11/13/2023    RIGHT HEART CATHETERIZATION Right 11/13/2023    Procedure: INSERTION, CATHETER, RIGHT HEART;  Surgeon: Juventino Bermudez Jr., MD;  Location: [facility] CATH LAB;  Service: Cardiology;  Laterality: Right;    RIGHT HEART CATHETERIZATION Right 11/20/2023    Procedure: INSERTION, CATHETER, RIGHT HEART;  Surgeon: Sajan Hurley MD;  Location: [facility] CATH LAB;  Service: Cardiology;  Laterality: Right;    RIGHT HEART CATHETERIZATION Right 1/22/2024    Procedure: INSERTION, CATHETER, RIGHT HEART;  Surgeon: Brayan Ocampo MD;  Location: [facility] CATH LAB;  Service: Cardiology;  Laterality: Right;    STERNAL WOUND CLOSURE N/A 12/4/2023    Procedure: CLOSURE, WOUND, STERNUM;  Surgeon: Yuri Washington MD;  [facility];  Service: Cardiovascular;  Laterality: N/A;    STERNOTOMY N/A 12/1/2023    Procedure: STERNOTOMY, REDO;  Surgeon: Yuri Washington MD;  Location: [facility] OR [facility];  Service: Cardiovascular;  Laterality: N/A;    VALVULOPLASTY, MITRAL VALVE N/A 12/1/2023    Procedure: VALVULOPLASTY, MITRAL VALVE;  Surgeon: Yuri Washington MD;  Location: [facility] OR [facility];  Service: Cardiovascular;  Laterality: N/A;       Time Tracking:     OT Date of Treatment: 05/28/24  OT Start Time: 0903  OT Stop Time: 0931  OT Total Time (min): 28 min    Billable Minutes:Evaluation 15  Therapeutic Activity 13    5/28/2024   Co-evaluation/treatment performed due to patient's multiple deficits requiring two skilled therapists to appropriately and safely assess patient's strength, endurance, functional mobility, and ADL performance while facilitating functional tasks in addition to accommodating for patient's activity tolerance and medical acuity.

## 2024-05-28 NOTE — ED PROVIDER NOTES
SCRIBE #1 NOTE: I, Yusuf Torres, am scribing for, and in the presence of, Christa Manning DO. I have scribed the entire note.       History     Chief Complaint   Patient presents with    Fall     Fell x 2 today, c/o weakness to right leg. C/O pain to right foot. LVAD patient.     Review of patient's allergies indicates:  No Known Allergies      History of Present Illness     HPI    5/27/2024, 7:43 PM  History obtained from the wife and patient      History of Present Illness: Radha Abbott is a 64 y.o. male patient with a PMHx of DM, CHF, and CAD who presents to the Emergency Department for evaluation of weakness to right foot which onset 2-3 days ago. Patient reports falling x 2 PTA and denies hitting his head. Patients wife mentioned that she noticed a intermittent slurred speech and intermittent confusion but was not sure if it wasn't because the patient had just woken up. Patient notes that his last warfarin was last night and he was told to hold his dose today due to an elevated INR level. Symptoms are constant and moderate in severity. No mitigating or exacerbating factors reported. Patient denies any ha, cp, sob, n/v/d, neck pain, abdominal pain, visual changes, back pain, and all other sxs at this time. No further complaints or concerns at this time.       Arrival mode: Personal vehicle    PCP: Vasu Kong MD        Past Medical History:  Past Medical History:   Diagnosis Date    CAD (coronary artery disease)     CHF (congestive heart failure)     Diabetes mellitus     HFrEF (heart failure with reduced ejection fraction)     ICD (implantable cardioverter-defibrillator) in place     MI, old        Past Surgical History:  Past Surgical History:   Procedure Laterality Date    ANGIOPLASTY-VENOUS ARTERY Right 12/1/2023    Procedure: ANGIOPLASTY-VENOUS ARTERY, RIGHT FEMORAL;  Surgeon: Yuri Washington MD;  Location: [facility] OR [address] [place] [address];  Service: Cardiovascular;  Laterality: Right;    AORTIC VALVULOPLASTY N/A  12/1/2023    Procedure: REPAIR, AORTIC VALVE;  Surgeon: Yuri Washington MD;  Location: [facility] OR 2ND FLR;  Service: Cardiovascular;  Laterality: N/A;    CARDIAC SURGERY      CLOSURE N/A 12/1/2023    Procedure: CLOSURE, TEMPORARY;  Surgeon: Yuri Washington MD;  Location: [facility] OR 2ND FLR;  Service: Cardiovascular;  Laterality: N/A;    DRAINAGE OF PLEURAL EFFUSION  12/4/2023    Procedure: DRAINAGE, PLEURAL EFFUSION;  Surgeon: Yuri Washington MD;  Location: [facility] OR [facility] FLR;  Service: Cardiovascular;;    INSERTION OF GRAFT TO PERICARDIUM  12/4/2023    Procedure: INSERTION, GRAFT, PERICARDIUM;  Surgeon: Yuri Washington MD;  Location: [facility] OR [facility] FLR;  Service: Cardiovascular;;    INSERTION OF INTRA-AORTIC BALLOON ASSIST DEVICE Right 11/21/2023    Procedure: INSERTION, INTRA-AORTIC BALLOON PUMP;  Surgeon: Finn Cohn MD;  Location: [facility] CATH LAB;  Service: Cardiology;  Laterality: Right;    LEFT VENTRICULAR ASSIST DEVICE Left 12/1/2023    Procedure: INSERTION-LEFT VENTRICULAR ASSIST DEVICE;  Surgeon: Yuri Washington MD;  [facility];  Service: Cardiovascular;  Laterality: Left;  REDO STERNOTOMY - REDO SAW NEEDED FOR CASE    LYSIS OF ADHESIONS  12/1/2023    Procedure: LYSIS, ADHESIONS;  Surgeon: Yuri Washington MD;  [facility];  Service: Cardiovascular;;    PLACEMENT OF SWAN ROLANDO CATHETER WITH IMAGING GUIDANCE  11/20/2023    Procedure: INSERTION, CATHETER, SWAN-ROLANDO, WITH IMAGING GUIDANCE;  Surgeon: Sajan Hurley MD;  Location: [facility] CATH LAB;  Service: Cardiology;;    REMOVAL OF TUNNELED CENTRAL VENOUS CATHETER (CVC) N/A 3/1/2024    Procedure: REMOVAL, CATHETER, CENTRAL VENOUS, TUNNELED;  Surgeon: Seble Aguilar MD;  Location: [facility] CATH LAB;  Service: Interventional Nephrology;  Laterality: N/A;    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 12/1/2023    Procedure: REPAIR, ANEURYSM, ARTERY, FEMORAL;  Surgeon: Yuri Washington MD;  Location: [facility] OR [facility] FLR;  Service:  Cardiovascular;  Laterality: Right;  Right Femoral Artery Repair    RIGHT HEART CATHETERIZATION Right 10/10/2023    Procedure: INSERTION, CATHETER, RIGHT HEART;  Surgeon: Bin Gandhi MD;  Location: [facility] CATH LAB;  Service: Cardiology;  Laterality: Right;    RIGHT HEART CATHETERIZATION Right 10/13/2023    Procedure: INSERTION, CATHETER, RIGHT HEART;  Surgeon: Walter Mcintyre MD;  Location: [facility] CATH LAB;  Service: Cardiology;  Laterality: Right;    RIGHT HEART CATHETERIZATION  11/13/2023    RIGHT HEART CATHETERIZATION Right 11/13/2023    Procedure: INSERTION, CATHETER, RIGHT HEART;  Surgeon: Juventino Bermudez Jr., MD;  Location: [facility] CATH LAB;  Service: Cardiology;  Laterality: Right;    RIGHT HEART CATHETERIZATION Right 11/20/2023    Procedure: INSERTION, CATHETER, RIGHT HEART;  Surgeon: Sajan Hurley MD;  Location: [facility] CATH LAB;  Service: Cardiology;  Laterality: Right;    RIGHT HEART CATHETERIZATION Right 1/22/2024    Procedure: INSERTION, CATHETER, RIGHT HEART;  Surgeon: Brayan Ocampo MD;  Location: [facility] CATH LAB;  Service: Cardiology;  Laterality: Right;    STERNAL WOUND CLOSURE N/A 12/4/2023    Procedure: CLOSURE, WOUND, STERNUM;  Surgeon: Yuri Washington MD;  [facility];  Service: Cardiovascular;  Laterality: N/A;    STERNOTOMY N/A 12/1/2023    Procedure: STERNOTOMY, REDO;  Surgeon: Yuri Washington MD;  [facility];  Service: Cardiovascular;  Laterality: N/A;    VALVULOPLASTY, MITRAL VALVE N/A 12/1/2023    Procedure: VALVULOPLASTY, MITRAL VALVE;  Surgeon: Yuri Washington MD;  [facility];  Service: Cardiovascular;  Laterality: N/A;         Family History:  No family history on file.    Social History:  Social History     Tobacco Use    Smoking status: Former     Current packs/day: 0.50     Types: Cigarettes    Smokeless tobacco: Not on file   Substance and Sexual Activity    Alcohol use: Yes     Comment: rarely    Drug use: No    Sexual activity:  Not Currently     Partners: Female        Review of Systems     Review of Systems   Eyes:  Negative for visual disturbance.   Respiratory:  Negative for shortness of breath.    Cardiovascular:  Negative for chest pain.   Gastrointestinal:  Negative for abdominal pain, diarrhea, nausea and vomiting.   Musculoskeletal:  Negative for back pain and neck pain.   Neurological:  Positive for speech difficulty, weakness ((+) Right LE) and numbness ((+) Right foot). Negative for dizziness, light-headedness and headaches.        Physical Exam     Initial Vitals   BP Pulse Resp Temp SpO2   05/27/24 1945 05/27/24 1606 05/27/24 1606 05/27/24 1606 05/27/24 1606   126/77 85 20 98.4 °F (36.9 °C) 96 %      MAP       --                 Physical Exam  Nursing Notes and Vital Signs Reviewed.  Constitutional: Patient is in no apparent distress. Well-developed and well-nourished.  Head: Atraumatic. Normocephalic.  Eyes: PERRL. EOM intact. Conjunctivae are not pale. No scleral icterus.  ENT: Mucous membranes are moist. Oropharynx is clear and symmetric.    Neck: Supple. Full ROM. No lymphadenopathy.  Cardiovascular:  Machine like hum consistent with LVAD.   Pulmonary/Chest: No respiratory distress. Clear to auscultation bilaterally. No wheezing or rales. Temporary PermaCath in left upper chest wall.  Abdominal: Soft and non-distended.  There is no tenderness.  No rebound, guarding, or rigidity.   Musculoskeletal: Moves all extremities. No obvious deformities. 1+ pitting edema in BLE. No calf tenderness. Weakness of RLE.  Skin: Warm and dry.  Neurological:  Alert, awake, and appropriate.  Normal speech.  No acute focal neurological deficits are appreciated.  Psychiatric: Normal affect. Good eye contact. Appropriate in content.     ED Course   Procedures  ED Vital Signs:  Vitals:    05/27/24 1606 05/27/24 1945 05/27/24 2045 05/27/24 2130   BP:  126/77     Pulse: 85 84 87 87   Resp: 20 18 20 (!) 22   Temp: 98.4 °F (36.9 °C)      TempSrc:  Oral      SpO2: 96% 98% 96% 96%   Weight: 83 kg (183 lb)       05/27/24 2231   BP:    Pulse: 85   Resp: 18   Temp:    TempSrc:    SpO2: 95%   Weight:        Abnormal Lab Results:  Labs Reviewed   CBC W/ AUTO DIFFERENTIAL - Abnormal; Notable for the following components:       Result Value    RBC 2.33 (*)     Hemoglobin 6.9 (*)     Hematocrit 21.5 (*)     RDW 18.1 (*)     Lymph # 0.5 (*)     Lymph % 10.2 (*)     Eosinophil % 8.7 (*)     All other components within normal limits   COMPREHENSIVE METABOLIC PANEL - Abnormal; Notable for the following components:    Potassium 3.1 (*)     Glucose 131 (*)     BUN 66 (*)     Creatinine 6.6 (*)     Albumin 2.8 (*)     eGFR 9 (*)     All other components within normal limits   PROTIME-INR - Abnormal; Notable for the following components:    Prothrombin Time 44.0 (*)     INR 4.2 (*)     All other components within normal limits   APTT - Abnormal; Notable for the following components:    aPTT 51.8 (*)     All other components within normal limits   MAGNESIUM   PHOSPHORUS   MAGNESIUM   PHOSPHORUS        All Lab Results:  Results for orders placed or performed during the hospital encounter of 05/27/24   CBC auto differential   Result Value Ref Range    WBC 5.31 3.90 - 12.70 K/uL    RBC 2.33 (L) 4.60 - 6.20 M/uL    Hemoglobin 6.9 (L) 14.0 - 18.0 g/dL    Hematocrit 21.5 (L) 40.0 - 54.0 %    MCV 92 82 - 98 fL    MCH 29.6 27.0 - 31.0 pg    MCHC 32.1 32.0 - 36.0 g/dL    RDW 18.1 (H) 11.5 - 14.5 %    Platelets 242 150 - 450 K/uL    MPV 9.8 9.2 - 12.9 fL    Immature Granulocytes 0.4 0.0 - 0.5 %    Gran # (ANC) 3.7 1.8 - 7.7 K/uL    Immature Grans (Abs) 0.02 0.00 - 0.04 K/uL    Lymph # 0.5 (L) 1.0 - 4.8 K/uL    Mono # 0.5 0.3 - 1.0 K/uL    Eos # 0.5 0.0 - 0.5 K/uL    Baso # 0.05 0.00 - 0.20 K/uL    nRBC 0 0 /100 WBC    Gran % 70.2 38.0 - 73.0 %    Lymph % 10.2 (L) 18.0 - 48.0 %    Mono % 9.6 4.0 - 15.0 %    Eosinophil % 8.7 (H) 0.0 - 8.0 %    Basophil % 0.9 0.0 - 1.9 %    Differential  Method Automated    Comprehensive metabolic panel   Result Value Ref Range    Sodium 142 136 - 145 mmol/L    Potassium 3.1 (L) 3.5 - 5.1 mmol/L    Chloride 97 95 - 110 mmol/L    CO2 29 23 - 29 mmol/L    Glucose 131 (H) 70 - 110 mg/dL    BUN 66 (H) 8 - 23 mg/dL    Creatinine 6.6 (H) 0.5 - 1.4 mg/dL    Calcium 9.1 8.7 - 10.5 mg/dL    Total Protein 8.1 6.0 - 8.4 g/dL    Albumin 2.8 (L) 3.5 - 5.2 g/dL    Total Bilirubin 0.2 0.1 - 1.0 mg/dL    Alkaline Phosphatase 112 55 - 135 U/L    AST 27 10 - 40 U/L    ALT 30 10 - 44 U/L    eGFR 9 (A) >60 mL/min/1.73 m^2    Anion Gap 16 8 - 16 mmol/L   Protime-INR   Result Value Ref Range    Prothrombin Time 44.0 (H) 9.0 - 12.5 sec    INR 4.2 (H) 0.8 - 1.2   APTT   Result Value Ref Range    aPTT 51.8 (H) 21.0 - 32.0 sec   Magnesium   Result Value Ref Range    Magnesium 1.8 1.6 - 2.6 mg/dL   Phosphorus   Result Value Ref Range    Phosphorus 3.7 2.7 - 4.5 mg/dL         Imaging Results:  Imaging Results              CT Head Without Contrast (Final result)  Result time 05/27/24 20:21:24      Final result by Tatyana Vargas MD (05/27/24 20:21:24)                   Impression:      No acute or adverse change      Electronically signed by: Tatyana Vargas  Date:    05/27/2024  Time:    20:21               Narrative:    EXAMINATION:  CT HEAD WITHOUT CONTRAST    CLINICAL HISTORY:  Mental status change, unknown cause;    TECHNIQUE:  Low dose axial CT images obtained throughout the head without intravenous contrast. Sagittal and coronal reconstructions were performed.    COMPARISON:  04/23/2024    FINDINGS:  Intracranial compartment:    Ventricles and sulci are normal in size for age without evidence of hydrocephalus. No extra-axial blood or fluid collections.    The brain parenchyma appears normal. No parenchymal mass, hemorrhage, edema or major vascular distribution infarct.    Skull/extracranial contents (limited evaluation): No fracture. Mastoid air cells and paranasal sinuses are  essentially clear.                                       X-Ray Foot Complete Right (Final result)  Result time 05/27/24 18:16:08      Final result by Tatyana Vargas MD (05/27/24 18:16:08)                   Impression:      No acute osseous finding      Electronically signed by: Tatyana Vargas  Date:    05/27/2024  Time:    18:16               Narrative:    EXAMINATION:  XR FOOT COMPLETE 3 VIEW RIGHT    CLINICAL HISTORY:  . Pain in right foot    TECHNIQUE:  AP, lateral, and oblique views of the right foot were performed.    COMPARISON:  None    FINDINGS:  Three-view exam    Extraneous artifact present.  As visualized no acute fracture dislocation or destructive bony finding.  Vascular calcification present.                                            ED Discussion       9:35 PM: Consult with Dr. Gema Casas (Cardiology ) at [facility] concerning pt. There are no  services, which the patient requires, offered at [facility] at this time. Dr. Gema Casas expresses understanding and will accept transfer for LVAD and neurology.  Accepting Facility: [facility]  Accepting Physician: Dr. Gema Casas      ED Course as of 05/27/24 2242   Mon May 27, 2024   1925 INR(!): 4.2 [NF]   1925 Hemoglobin(!): 6.9 [NF]   1925 Hematocrit(!): 21.5  Baseline [NF]   1925 Potassium(!): 3.1 [NF]   1926 Potassium(!): 3.1 [NF]   1926 BUN(!): 66 [NF]   1926 Creatinine(!): 6.6  CKD [NF]   1945 Discharge summary from 05/01/2020 for from [place] reviewed:  65 yo male admitted with COVID and Enterococcus bacteremia.  Hospital course complicated by IVÁN requiring HD via for PermaCath. He was found to have positive blood cultures: Enterococcus Bacteremia without sepsis.  TTE done and negative for vegetation, but ID recommendations were to treat for 6 weeks for presumptive endocarditis. He had tunneled PICC placed and plan is for Rocephin and Ampicillin until estimated end date of  5/30/24.  [NF]   2040 64-year-old male with a history of CHF, CAD, diabetes, LVAD, Enterococcus bacteremia, and CKD who presents with falls secondary to right lower extremity weakness that started 2-3 days ago.  Wife also reports intermittent slurred speech and confusion.  NIH stroke scale 2, out of the thrombolytic window also not a candidate due to an INR 4.2.  Workup reveals mild hypokalemia with CKD and anemia of chronic kidney disease.  CT head is negative for intracranial hemorrhage and x-ray of right foot shows no acute fractures or dislocations.  Consider admission for CVA evaluation and PT OT. [NF]   2116 Discussed with Dr. Kraus (LVAD) at [facility] who accepted patient to tele after consult with neurology (Dr Ardon) who stated patient is not a candidate for thrombolytics or thrombectomy. [NF]      ED Course User Index  [NF] Christa Manning, DO     Medical Decision Making  64-year-old male with a history of CHF, CAD, diabetes, LVAD, Enterococcus bacteremia, and CKD who presents with falls secondary to right lower extremity weakness that started 2-3 days ago.  Wife also reports intermittent slurred speech and confusion.  NIH stroke scale 2, out of the thrombolytic window also not a candidate due to an INR 4.2.  Workup reveals mild hypokalemia with CKD and anemia of chronic kidney disease (appears near baseline).  Wife reports epistaxis.  No epistaxis in the ER.  CT head is negative for intracranial hemorrhage and x-ray of right foot shows no acute fractures or dislocations.  Consider admission for CVA evaluation and PT OT.    Amount and/or Complexity of Data Reviewed  Labs: ordered. Decision-making details documented in ED Course.  Radiology: ordered. Decision-making details documented in ED Course.       Additional MDM:     NIH Stroke Scale:   Interval = baseline (upon arrival/admit)  Level of consciousness = 0 - alert  LOC questions = 0 - answers both correctly  LOC commands = 0 - performs both  correctly  Best gaze = 0 - normal  Visual = 0 - no visual loss  Facial palsy = 0 - normal  Motor left arm =  0 - no drift  Motor right arm =  0 - no drift  Motor left leg = 0 - no drift  Motor right leg =  1 - drift  Limb ataxia = 0 - absent  Sensory = 1 - mild to moderate loss  Best language = 0 - no aphasia  Dysarthria = 0 - normal articulation  Extinction and inattention = 0 - no neglect  NIH Stroke Scale Total = 2              ED Medication(s):  Medications - No data to display    New Prescriptions    No medications on file               Scribe Attestation:   Scribe #1: I performed the above scribed service and the documentation accurately describes the services I performed. I attest to the accuracy of the note.     Attending:   Physician Attestation Statement for Scribe #1: I, Christa Manning DO, personally performed the services described in this documentation, as scribed by Yusuf Torres, in my presence, and it is both accurate and complete.           Clinical Impression       ICD-10-CM ICD-9-CM   1. Weakness of right lower extremity  R29.898 729.89   2. Right foot pain  M79.671 729.5   3. Fall, initial encounter  W19.XXXA E888.9   4. LVAD (left ventricular assist device) present  Z95.811 V43.21   5. Chronic kidney disease, unspecified CKD stage  N18.9 585.9   6. Anemia due to chronic kidney disease, unspecified CKD stage  N18.9 285.21    D63.1    7. Supratherapeutic INR  R79.1 790.92   8. Hypokalemia  E87.6 276.8       Disposition:   Disposition: Transferred  Condition: Stable         Christa Manning DO  05/27/24 8023

## 2024-05-28 NOTE — PLAN OF CARE
PT evaluation completed- see note for details. PT POC and goals established.    Problem: Physical Therapy  Goal: Physical Therapy Goal  Description: Goals to be met by: 24     Patient will increase functional independence with mobility by performin. Supine to sit with [place]  2. Sit to stand transfer with Modified [place]  3. Bed to chair transfer with Modified [place] using LRAD  4. Gait  x 200 feet with Supervision using LRAD.   5. Stand for 10 minutes with [place] using LRAD  6. Lower extremity exercise program x15 reps per handout, with independence    Outcome: Progressing

## 2024-05-29 ENCOUNTER — PATIENT MESSAGE (OUTPATIENT)
Dept: TRANSPLANT | Facility: CLINIC | Age: 64
End: 2024-05-29
Payer: MEDICAID

## 2024-05-29 PROBLEM — N17.9 ACUTE RENAL FAILURE SUPERIMPOSED ON STAGE 4 CHRONIC KIDNEY DISEASE: Status: ACTIVE | Noted: 2024-05-29

## 2024-05-29 PROBLEM — N18.9 ACUTE ON CHRONIC RENAL FAILURE: Status: ACTIVE | Noted: 2024-05-29

## 2024-05-29 PROBLEM — N18.4 ACUTE RENAL FAILURE SUPERIMPOSED ON STAGE 4 CHRONIC KIDNEY DISEASE: Status: ACTIVE | Noted: 2024-05-29

## 2024-05-29 LAB
ALBUMIN SERPL BCP-MCNC: 2.6 G/DL (ref 3.5–5.2)
ALBUMIN SERPL ELPH-MCNC: 3.82 G/DL (ref 3.35–5.55)
ALP SERPL-CCNC: 113 U/L (ref 55–135)
ALPHA1 GLOB SERPL ELPH-MCNC: 0.45 G/DL (ref 0.17–0.41)
ALPHA2 GLOB SERPL ELPH-MCNC: 0.92 G/DL (ref 0.43–0.99)
ALT SERPL W/O P-5'-P-CCNC: 29 U/L (ref 10–44)
ANION GAP SERPL CALC-SCNC: 14 MMOL/L (ref 8–16)
APTT PPP: 45.4 SEC (ref 21–32)
AST SERPL-CCNC: 26 U/L (ref 10–40)
B-GLOBULIN SERPL ELPH-MCNC: 0.86 G/DL (ref 0.5–1.1)
BACTERIA #/AREA URNS AUTO: NORMAL /HPF
BASOPHILS # BLD AUTO: 0.03 K/UL (ref 0–0.2)
BASOPHILS NFR BLD: 0.6 % (ref 0–1.9)
BILIRUB DIRECT SERPL-MCNC: 0.1 MG/DL (ref 0.1–0.3)
BILIRUB SERPL-MCNC: 0.2 MG/DL (ref 0.1–1)
BILIRUB UR QL STRIP: NEGATIVE
BNP SERPL-MCNC: 1429 PG/ML (ref 0–99)
BUN SERPL-MCNC: 67 MG/DL (ref 8–23)
CALCIUM SERPL-MCNC: 9.2 MG/DL (ref 8.7–10.5)
CHLORIDE SERPL-SCNC: 101 MMOL/L (ref 95–110)
CLARITY UR REFRACT.AUTO: CLEAR
CO2 SERPL-SCNC: 26 MMOL/L (ref 23–29)
COLOR UR AUTO: COLORLESS
CREAT SERPL-MCNC: 6.4 MG/DL (ref 0.5–1.4)
CREAT UR-MCNC: 23 MG/DL (ref 23–375)
CRP SERPL-MCNC: 96.7 MG/L (ref 0–8.2)
DIFFERENTIAL METHOD BLD: ABNORMAL
EOSINOPHIL # BLD AUTO: 0.3 K/UL (ref 0–0.5)
EOSINOPHIL NFR BLD: 6.4 % (ref 0–8)
ERYTHROCYTE [DISTWIDTH] IN BLOOD BY AUTOMATED COUNT: 19 % (ref 11.5–14.5)
EST. GFR  (NO RACE VARIABLE): 9.2 ML/MIN/1.73 M^2
FOLATE SERPL-MCNC: 3.3 NG/ML (ref 4–24)
GAMMA GLOB SERPL ELPH-MCNC: 1.45 G/DL (ref 0.67–1.58)
GLUCOSE SERPL-MCNC: 106 MG/DL (ref 70–110)
GLUCOSE UR QL STRIP: NEGATIVE
HCT VFR BLD AUTO: 20 % (ref 40–54)
HGB BLD-MCNC: 6.4 G/DL (ref 14–18)
HGB UR QL STRIP: NEGATIVE
HYALINE CASTS UR QL AUTO: 0 /LPF
IMM GRANULOCYTES # BLD AUTO: 0.01 K/UL (ref 0–0.04)
IMM GRANULOCYTES NFR BLD AUTO: 0.2 % (ref 0–0.5)
INR PPP: 2.5 (ref 0.8–1.2)
INTERPRETATION SERPL IFE-IMP: NORMAL
KETONES UR QL STRIP: NEGATIVE
LDH SERPL L TO P-CCNC: 356 U/L (ref 110–260)
LEUKOCYTE ESTERASE UR QL STRIP: NEGATIVE
LYMPHOCYTES # BLD AUTO: 0.5 K/UL (ref 1–4.8)
LYMPHOCYTES NFR BLD: 10.2 % (ref 18–48)
MAGNESIUM SERPL-MCNC: 1.9 MG/DL (ref 1.6–2.6)
MCH RBC QN AUTO: 30.3 PG (ref 27–31)
MCHC RBC AUTO-ENTMCNC: 32 G/DL (ref 32–36)
MCV RBC AUTO: 95 FL (ref 82–98)
MICROSCOPIC COMMENT: NORMAL
MONOCYTES # BLD AUTO: 0.5 K/UL (ref 0.3–1)
MONOCYTES NFR BLD: 9.2 % (ref 4–15)
NEUTROPHILS # BLD AUTO: 3.7 K/UL (ref 1.8–7.7)
NEUTROPHILS NFR BLD: 73.4 % (ref 38–73)
NITRITE UR QL STRIP: NEGATIVE
NRBC BLD-RTO: 0 /100 WBC
PATHOLOGIST INTERPRETATION IFE: NORMAL
PATHOLOGIST INTERPRETATION SPE: NORMAL
PH UR STRIP: 7 [PH] (ref 5–8)
PHOSPHATE SERPL-MCNC: 4.1 MG/DL (ref 2.7–4.5)
PLATELET # BLD AUTO: 247 K/UL (ref 150–450)
PMV BLD AUTO: 10.4 FL (ref 9.2–12.9)
POTASSIUM SERPL-SCNC: 4.1 MMOL/L (ref 3.5–5.1)
PREALB SERPL-MCNC: 25 MG/DL (ref 20–43)
PROT SERPL-MCNC: 7.3 G/DL (ref 6–8.4)
PROT SERPL-MCNC: 7.5 G/DL (ref 6–8.4)
PROT UR QL STRIP: ABNORMAL
PROT UR-MCNC: 37 MG/DL (ref 0–15)
PROT/CREAT UR: 1.61 MG/G{CREAT} (ref 0–0.2)
PROTHROMBIN TIME: 26.2 SEC (ref 9–12.5)
RBC # BLD AUTO: 2.11 M/UL (ref 4.6–6.2)
RBC #/AREA URNS AUTO: 3 /HPF (ref 0–4)
RPR SER QL: NORMAL
SODIUM SERPL-SCNC: 141 MMOL/L (ref 136–145)
SODIUM UR-SCNC: 125 MMOL/L (ref 20–250)
SP GR UR STRIP: 1.01 (ref 1–1.03)
URN SPEC COLLECT METH UR: ABNORMAL
VIT B12 SERPL-MCNC: 280 PG/ML (ref 210–950)
WBC # BLD AUTO: 4.99 K/UL (ref 3.9–12.7)
WBC #/AREA URNS AUTO: 0 /HPF (ref 0–5)

## 2024-05-29 PROCEDURE — 20600001 HC STEP DOWN PRIVATE ROOM

## 2024-05-29 PROCEDURE — 81001 URINALYSIS AUTO W/SCOPE: CPT | Performed by: REGISTERED NURSE

## 2024-05-29 PROCEDURE — 63600175 PHARM REV CODE 636 W HCPCS: Performed by: INTERNAL MEDICINE

## 2024-05-29 PROCEDURE — 84100 ASSAY OF PHOSPHORUS: CPT | Performed by: INTERNAL MEDICINE

## 2024-05-29 PROCEDURE — 85025 COMPLETE CBC W/AUTO DIFF WBC: CPT | Performed by: INTERNAL MEDICINE

## 2024-05-29 PROCEDURE — 36415 COLL VENOUS BLD VENIPUNCTURE: CPT | Performed by: INTERNAL MEDICINE

## 2024-05-29 PROCEDURE — 84134 ASSAY OF PREALBUMIN: CPT | Performed by: INTERNAL MEDICINE

## 2024-05-29 PROCEDURE — 94761 N-INVAS EAR/PLS OXIMETRY MLT: CPT

## 2024-05-29 PROCEDURE — 86140 C-REACTIVE PROTEIN: CPT | Performed by: INTERNAL MEDICINE

## 2024-05-29 PROCEDURE — 83880 ASSAY OF NATRIURETIC PEPTIDE: CPT | Performed by: INTERNAL MEDICINE

## 2024-05-29 PROCEDURE — 80048 BASIC METABOLIC PNL TOTAL CA: CPT | Performed by: INTERNAL MEDICINE

## 2024-05-29 PROCEDURE — 25000003 PHARM REV CODE 250: Performed by: STUDENT IN AN ORGANIZED HEALTH CARE EDUCATION/TRAINING PROGRAM

## 2024-05-29 PROCEDURE — 83735 ASSAY OF MAGNESIUM: CPT | Performed by: INTERNAL MEDICINE

## 2024-05-29 PROCEDURE — 25000003 PHARM REV CODE 250: Performed by: INTERNAL MEDICINE

## 2024-05-29 PROCEDURE — 82570 ASSAY OF URINE CREATININE: CPT | Performed by: STUDENT IN AN ORGANIZED HEALTH CARE EDUCATION/TRAINING PROGRAM

## 2024-05-29 PROCEDURE — 84300 ASSAY OF URINE SODIUM: CPT | Performed by: STUDENT IN AN ORGANIZED HEALTH CARE EDUCATION/TRAINING PROGRAM

## 2024-05-29 PROCEDURE — 87040 BLOOD CULTURE FOR BACTERIA: CPT | Mod: 59 | Performed by: INTERNAL MEDICINE

## 2024-05-29 PROCEDURE — 80076 HEPATIC FUNCTION PANEL: CPT | Performed by: INTERNAL MEDICINE

## 2024-05-29 PROCEDURE — 27000248 HC VAD-ADDITIONAL DAY

## 2024-05-29 PROCEDURE — 63600175 PHARM REV CODE 636 W HCPCS: Performed by: STUDENT IN AN ORGANIZED HEALTH CARE EDUCATION/TRAINING PROGRAM

## 2024-05-29 PROCEDURE — 85610 PROTHROMBIN TIME: CPT | Performed by: INTERNAL MEDICINE

## 2024-05-29 PROCEDURE — 97116 GAIT TRAINING THERAPY: CPT

## 2024-05-29 PROCEDURE — 25000242 PHARM REV CODE 250 ALT 637 W/ HCPCS: Performed by: INTERNAL MEDICINE

## 2024-05-29 PROCEDURE — 93750 INTERROGATION VAD IN PERSON: CPT | Mod: ,,, | Performed by: INTERNAL MEDICINE

## 2024-05-29 PROCEDURE — 85730 THROMBOPLASTIN TIME PARTIAL: CPT | Performed by: INTERNAL MEDICINE

## 2024-05-29 PROCEDURE — 97530 THERAPEUTIC ACTIVITIES: CPT

## 2024-05-29 PROCEDURE — 99233 SBSQ HOSP IP/OBS HIGH 50: CPT | Mod: ,,, | Performed by: REGISTERED NURSE

## 2024-05-29 PROCEDURE — 83615 LACTATE (LD) (LDH) ENZYME: CPT | Performed by: INTERNAL MEDICINE

## 2024-05-29 PROCEDURE — 25000003 PHARM REV CODE 250: Performed by: REGISTERED NURSE

## 2024-05-29 PROCEDURE — 27000207 HC ISOLATION

## 2024-05-29 RX ORDER — HYDRALAZINE HYDROCHLORIDE 20 MG/ML
10 INJECTION INTRAMUSCULAR; INTRAVENOUS ONCE
Status: COMPLETED | OUTPATIENT
Start: 2024-05-29 | End: 2024-05-29

## 2024-05-29 RX ORDER — ACETAMINOPHEN 325 MG/1
650 TABLET ORAL EVERY 6 HOURS PRN
Status: DISCONTINUED | OUTPATIENT
Start: 2024-05-29 | End: 2024-05-31 | Stop reason: HOSPADM

## 2024-05-29 RX ORDER — FOLIC ACID 1 MG/1
1 TABLET ORAL DAILY
Status: DISCONTINUED | OUTPATIENT
Start: 2024-05-29 | End: 2024-05-31 | Stop reason: HOSPADM

## 2024-05-29 RX ORDER — WARFARIN 2.5 MG/1
2.5 TABLET ORAL DAILY
Status: DISCONTINUED | OUTPATIENT
Start: 2024-05-29 | End: 2024-05-31 | Stop reason: HOSPADM

## 2024-05-29 RX ADMIN — HYDRALAZINE HYDROCHLORIDE 10 MG: 20 INJECTION, SOLUTION INTRAMUSCULAR; INTRAVENOUS at 10:05

## 2024-05-29 RX ADMIN — BUMETANIDE 2 MG: 0.25 INJECTION INTRAMUSCULAR; INTRAVENOUS at 08:05

## 2024-05-29 RX ADMIN — GABAPENTIN 100 MG: 100 CAPSULE ORAL at 08:05

## 2024-05-29 RX ADMIN — CHOLECALCIFEROL TAB 25 MCG (1000 UNIT) 1000 UNITS: 25 TAB at 08:05

## 2024-05-29 RX ADMIN — VENLAFAXINE 37.5 MG: 37.5 TABLET ORAL at 08:05

## 2024-05-29 RX ADMIN — CEFTRIAXONE 2 G: 2 INJECTION, POWDER, FOR SOLUTION INTRAMUSCULAR; INTRAVENOUS at 08:05

## 2024-05-29 RX ADMIN — HYDRALAZINE HYDROCHLORIDE 100 MG: 50 TABLET ORAL at 08:05

## 2024-05-29 RX ADMIN — FAMOTIDINE 20 MG: 20 TABLET ORAL at 08:05

## 2024-05-29 RX ADMIN — HYDRALAZINE HYDROCHLORIDE 100 MG: 50 TABLET ORAL at 05:05

## 2024-05-29 RX ADMIN — AMPICILLIN 2 G: 2 INJECTION, POWDER, FOR SOLUTION INTRAMUSCULAR; INTRAVENOUS at 08:05

## 2024-05-29 RX ADMIN — ATORVASTATIN CALCIUM 40 MG: 20 TABLET, FILM COATED ORAL at 08:05

## 2024-05-29 RX ADMIN — FOLIC ACID 1 MG: 1 TABLET ORAL at 08:05

## 2024-05-29 RX ADMIN — HYDRALAZINE HYDROCHLORIDE 100 MG: 50 TABLET ORAL at 01:05

## 2024-05-29 RX ADMIN — CEFTRIAXONE 2 G: 2 INJECTION, POWDER, FOR SOLUTION INTRAMUSCULAR; INTRAVENOUS at 09:05

## 2024-05-29 RX ADMIN — OMEGA-3-ACID ETHYL ESTERS 2 G: 1 CAPSULE, LIQUID FILLED ORAL at 08:05

## 2024-05-29 RX ADMIN — AMIODARONE HYDROCHLORIDE 400 MG: 200 TABLET ORAL at 08:05

## 2024-05-29 RX ADMIN — WARFARIN SODIUM 2.5 MG: 2.5 TABLET ORAL at 04:05

## 2024-05-29 RX ADMIN — TRAZODONE HYDROCHLORIDE 25 MG: 50 TABLET ORAL at 08:05

## 2024-05-29 RX ADMIN — Medication 400 MG: at 08:05

## 2024-05-29 RX ADMIN — FLUTICASONE PROPIONATE 100 MCG: 50 SPRAY, METERED NASAL at 08:05

## 2024-05-29 RX ADMIN — ACETAMINOPHEN 650 MG: 325 TABLET ORAL at 05:05

## 2024-05-29 NOTE — CARE UPDATE
Unit [facility] Care Support Interaction      I have reviewed the chart of Radha Abbott who is hospitalized for Weakness of foot, right. The patient is currently located in the following unit: CSU        I have assisted the primary physician in management of the following:      Central Line - Present on admission to the unit - ordered blood cultures       Larissa Blackwell NP  Unit Based [facility]

## 2024-05-29 NOTE — HPI
Mr. Abbott is a 64-year-old man with ischemic cardiomyopathy with most recent TTE showing EF 10 -15% and G3DD s/p [employer]tron[employer] ICM placement previously on chronic dobutamine infusion however now with Heartmate III LVAD placed on 12/01/2023, CAD s/p x3 vessel CABG back in 2009, type 2 diabetes mellitus (most recent hemoglobin A1c 7.4%), hypertension, HLD, history of ventricular fibrillation for which he is on amiodarone, chronic AC with Coumadin, Enterococcus bacteremia on intravenous Rocephin and ampicillin, advanced CKD V (baseline creatinine ~5 at best) with recurrent AKIs and previous dialysis dependence (tunneled HD catheter removed in early March of this year) who was admitted 5/28 after presented to OSH ED with complaints of frequent falls secondary to right foot immobility/movement issue concerning for drop foot in addition to increased weight and worsening lower extremity edema. CBC was notable for hemoglobin 6.5. Metabolic panel on presentation was notable for serum potassium 3, BUN 68 and creatinine 6.5. Chest x-ray showed enlarged cardiac silhouette, increased pulmonary vascular and interstitial markings and probable new left pleural effusion and BNP was ~1.4K. He was management with Bumex 2 mg IV BID in addition to receiving metolazone 10 mg PO. He is making urine with ~1.7 liters documented thus far. UA with +1 protein (UPCR 1.6 grams) and urine sodium 125. Retroperitoneal ultrasound pending at this time. Nephrology has been consulted for assistance with management of IVÁN on CKD.

## 2024-05-29 NOTE — ASSESSMENT & PLAN NOTE
Procedure: Device Interrogation Including analysis of device parameters  Current Settings: Ventricular Assist Device  Review of device function is stable    Cr is elevated, leg edema present and weight went up by 2 lbs  Home regimen is 4 bid po bumex with prn metolazone  Continue IV bumex 2 mg bid. Metolazone as needed   Monitor I/Os with daily weights   Obtain echo once euvolemic           5/29/2024     5:57 AM 5/28/2024    11:55 PM 5/28/2024     9:58 PM 5/28/2024    12:26 PM 5/28/2024     7:44 AM 5/28/2024     4:00 AM 5/28/2024     1:06 AM   TXP LVAD INTERROGATIONS   Type HeartMate3 HeartMate3 HeartMate3 HeartMate3 HeartMate3 HeartMate3 HeartMate3   Flow 3.8 3.8 4 3.6 4 3.8 3.9   Speed 5000 5000 5000 5050 5000 5000 5000   PI 4.9 6 4.8 7.2 4.4 5.7 5.9   Power ([place]) 3.3 3.3 3.4 3.4 3.4 3.4 3.4   LSL 4600 4600 4600 4600 4600 4600 4600   Pulsatility Intermittent pulse Intermittent pulse Intermittent pulse Pulse Pulse Intermittent pulse Pulse

## 2024-05-29 NOTE — PLAN OF CARE
Recommendations  1. Continue Regular diet-rec'd addition of renal restrictions  2. Rec'd addition of Boost GC BID for optimization of protein/calorie intake  3. RD following      Goals: Meet % of EEN/EPN by RD f/u date  Nutrition Goal Status: goal not met  Communication of RD Recs: POC

## 2024-05-29 NOTE — ASSESSMENT & PLAN NOTE
-possibly secondary to dilutional anemia in the setting of hypervolemia.   -Also in the setting of renal failure  -Was supposed to be set up with Epo injections as outpatient   -H&H 6.4/20  -Did report some hematuria today which has already resolved. No other overt signs of bleeding   -Trend CBCs

## 2024-05-29 NOTE — SUBJECTIVE & OBJECTIVE
Interval History: No acute events overnight. Crt remains elevated today. Net negative 1.1L in last 24hrs on IVP Bumex 2mg BID. Did also get 10mg Metolazone yesterday. Consulting Nephrology for ongoing renal failure and anemia. Had episode of hematuria. Feels fine otherwise.       Scheduled Meds:   amiodarone  400 mg Oral Daily    ampicillin (OMNIPEN) 2 g in sodium chloride 0.9 % 100 mL IVPB (MB+)  2 g Intravenous Q12H    atorvastatin  40 mg Oral QHS    bumetanide  2 mg Intravenous BID    cefTRIAXone (ROCEPHIN) 2 g in dextrose 5 % in water (D5W) 100 mL IVPB (MB+)  2 g Intravenous Q12H    famotidine  20 mg Oral Daily    fluticasone propionate  2 spray Each Nostril Daily    folic acid  1 mg Oral Daily    gabapentin  100 mg Oral BID    hydrALAZINE  100 mg Oral Q8H    magnesium oxide  400 mg Oral Daily    omega-3 acid ethyl esters  2 g Oral BID    traZODone  25 mg Oral QHS    venlafaxine  37.5 mg Oral Daily    vitamin D  1,000 Units Oral Daily    warfarin  2.5 mg Oral Daily     PRN Meds:  Current Facility-Administered Medications:     acetaminophen, 650 mg, Oral, Q6H PRN    senna-docusate 8.6-50 mg, 2 tablet, Oral, Daily PRN    Review of patient's allergies indicates:  No Known Allergies  Objective:     Vital Signs (Most Recent):  Temp: 97.4 °F (36.3 °C) (05/29/24 1134)  Pulse: 61 (05/29/24 1134)  Resp: 18 (05/29/24 1134)  BP: (!) 84/0 (05/29/24 0852)  SpO2: 99 % (05/29/24 1134) Vital Signs (24h Range):  Temp:  [96.7 °F (35.9 °C)-98.1 °F (36.7 °C)] 97.4 °F (36.3 °C)  Pulse:  [61-94] 61  Resp:  [16-18] 18  SpO2:  [93 %-99 %] 99 %  BP: ()/(0-88) 84/0     Patient Vitals for the past 72 hrs (Last 3 readings):   Weight   05/28/24 0200 84.4 kg (186 lb 1.1 oz)     Body mass index is 23.89 kg/m².      Intake/Output Summary (Last 24 hours) at 5/29/2024 1155  Last data filed at 5/29/2024 0555  Gross per 24 hour   Intake 480 ml   Output 1250 ml   Net -770 ml       Hemodynamic Parameters:       Telemetry: NSR        Physical  Exam  Constitutional:       Appearance: Normal appearance.   HENT:      Head: Normocephalic and atraumatic.   Neck:      Comments: JVD present   Cardiovascular:      Rate and Rhythm: Normal rate and regular rhythm.      Comments: Smooth VAD hum   Pulmonary:      Effort: Pulmonary effort is normal.      Breath sounds: Normal breath sounds.   Abdominal:      General: Bowel sounds are normal.      Palpations: Abdomen is soft.   Musculoskeletal:      Cervical back: Normal range of motion and neck supple.      Right lower leg: Edema present.      Left lower leg: Edema present.      Comments: R foot weakness/some numbness    Neurological:      General: No focal deficit present.      Mental Status: He is alert and oriented to person, place, and time.      Motor: Weakness present.      Comments: R foot weakness             Significant Labs:  CBC:  Recent Labs   Lab 05/28/24  0559 05/28/24  1244 05/29/24  0530   WBC 5.46 5.44 4.99   RBC 2.29* 2.39* 2.11*   HGB 6.6* 6.9* 6.4*   HCT 21.7* 22.8* 20.0*    260 247   MCV 95 95 95   MCH 28.8 28.9 30.3   MCHC 30.4* 30.3* 32.0     BNP:  Recent Labs   Lab 05/23/24  0940 05/29/24  0530   BNP 1,425* 1,429*     CMP:  Recent Labs   Lab 05/27/24  1305 05/27/24  1636 05/28/24  0559 05/28/24  1117 05/28/24  1244 05/29/24  0530   * 131*   < > 222* 210* 106   CALCIUM 8.8 9.1   < > 8.7 9.3 9.2   ALBUMIN 2.8* 2.8*  --   --   --  2.6*   PROT 7.3 8.1  --   --   --  7.3    142   < > 146* 140 141   K 3.0* 3.1*   < > 3.9 4.2 4.1   CO2 28 29   < > 25 26 26   CL 95 97   < > 102 99 101   BUN 68* 66*   < > 59* 63* 67*   CREATININE 6.5* 6.6*   < > 6.0* 6.5* 6.4*   ALKPHOS 107 112  --   --   --  113   ALT 33 30  --   --   --  29   AST 28 27  --   --   --  26   BILITOT 0.2 0.2  --   --   --  0.2    < > = values in this interval not displayed.      Coagulation:   Recent Labs   Lab 05/27/24  1636 05/28/24  0559 05/29/24  0530   INR 4.2* 3.4* 2.5*   APTT 51.8* 49.3* 45.4*     LDH:  Recent  Labs   Lab 05/28/24  0559 05/29/24  0530   * 356*     Microbiology:  Microbiology Results (last 7 days)       ** No results found for the last 168 hours. **            I have reviewed all pertinent labs within the past 24 hours.    Estimated Creatinine Clearance: 13.9 mL/min (A) (based on SCr of 6.4 mg/dL (H)).    Diagnostic Results:  I have reviewed all pertinent imaging results/findings within the past 24 hours.

## 2024-05-29 NOTE — ASSESSMENT & PLAN NOTE
- patient with progressing advanced CKD V (baseline creatinine ~5 at best) with recurrent AKIs and previous dialysis dependence (tunneled HD catheter removed in early March of this year)   - his renally function likely reflects progression of his CKD in setting of CRS type 2, his urinary sediment was largely bland   - will follow-up retroperitoneal US  - can continue Bumex 2 mg IV BID and loop diuretic if warranted by primary team  - favor serial RFPs and magnesium levels in light of active diuresis   - please avoid hypotension/major fluctuations in BP (MAP > 65 mmHg)  - renal diet when not NPO  - strict I/O's and daily weights  - renally all dose medications to eGFR   - avoid nephrotoxic agents wean feasible (i.e. NSAIDs, intra-arterial contrast, supra-therapeutic vancomycin levels, etc.)  - currently there are no acute indications for RRT at this time however will continue to monitor closely

## 2024-05-29 NOTE — CONSULTS
[facility] - Cardiology Stepdown  Adult Nutrition  Consult Note    SUMMARY     Recommendations  1. Continue Regular diet-rec'd addition of renal restrictions  2. Rec'd addition of Boost GC BID for optimization of protein/calorie intake  3. RD following     Goals: Meet % of EEN/EPN by RD f/u date  Nutrition Goal Status: goal not met  Communication of RD Recs: POC    Assessment and Plan    Nutrition Problem  Increased protein needs     Related to (etiology):   Physiological demands     Signs and Symptoms (as evidenced by):   LVAD present     Interventions (treatment strategy):  Collaboration of nutrition care w/ other providers     Nutrition Diagnosis Status:   New      Reason for Assessment    Reason For Assessment: consult  Diagnosis: other (see comments) (weakness of right foot)  Relevant Medical History: CHF, DM  Interdisciplinary Rounds: attended  General Information Comments: RD consulted for nutritional assessment. Pt consuming 25-50% of meals. Pt is currently on a regular diet. Pt w/ no issues chewing/swallowing. Per chart review, noted some weight flucutations noted- some may likely be d/t fluid. Pt does not meet criteria for malnutrition at this time. LBM noted- 5/29/24  Nutrition Discharge Planning: renal/cardiac diet    Nutrition Risk Screen    Nutrition Risk Screen: no indicators present    Nutrition/Diet History    Spiritual, Cultural Beliefs, Rastafari Practices, Values that Affect Care: no  Food Allergies: NKFA    Anthropometrics    Temp: 98.1 °F (36.7 °C)  Weight Method: Standard Scale  Weight:  (patient refused)  Weight (lb): 186.07 lb       Lab/Procedures/Meds    Pertinent Labs Reviewed: reviewed  Pertinent Labs Comments: BUN 67, Cr 6.4, GFR 9.2  Pertinent Medications Reviewed: reviewed  Pertinent Medications Comments: folic acid    Estimated/Assessed Needs    Weight Used For Calorie Calculations: 84.4 kg (186 lb 1.1 oz)  Energy Calorie Requirements (kcal): 1688  Energy Need Method: Kcal/kg  (20 kcal/kg)  Protein Requirements: 101 g (1.2 g/kg)  Weight Used For Protein Calculations: 84.4 kg (186 lb 1.1 oz)        RDA Method (mL): 1688         Nutrition Prescription Ordered    Current Diet Order: Regular diet    Evaluation of Received Nutrient/Fluid Intake    I/O: -  Energy Calories Required: not meeting needs  Protein Required: not meeting needs  Fluid Required: not meeting needs  Total Fluid Intake (mL/kg): 1 ml or fluid per MD  Tolerance: tolerating  % Intake of Estimated Energy Needs: 25 - 50 %  % Meal Intake: 25 - 50 %    Nutrition Risk    Level of Risk/Frequency of Follow-up: low ((1x/week))       Monitor and Evaluation    Food and Nutrient Intake: energy intake, food and beverage intake  Food and Nutrient Adminstration: diet order  Knowledge/Beliefs/Attitudes: food and nutrition knowledge/skill, beliefs and attitudes  Physical Activity and Function: nutrition-related ADLs and IADLs, factors affecting access to physical activity  Anthropometric Measurements: height/length, weight, weight change, body mass index, growth pattern indices/percentile ranks  Biochemical Data, Medical Tests and Procedures: electrolyte and renal panel, gastrointestinal profile, glucose/endocrine profile, inflammatory profile, lipid profile  Nutrition-Focused Physical Findings: overall appearance, extremities, muscles and bones, head and eyes, skin       Nutrition Follow-Up    RD Follow-up?: Yes

## 2024-05-29 NOTE — ASSESSMENT & PLAN NOTE
R foot motor and sensory weakness x 2 days  2 mechanical falls  CT head and xray foot unremarkable  Vascular neurology consulted-they suspect R common peroneal neuropathy. Unable to obtain MRI due to VAD   Neuropathy labs pending   PT/OT consult

## 2024-05-29 NOTE — PROGRESS NOTES
[facility] - Cardiology Stepdown  Heart Transplant  Progress Note    Patient Name: Radha Abbott  MRN: [id number]  Admission Date: 5/28/2024  Hospital Length of Stay: 1 days  Attending Physician: Sajan Hurley, *  Primary Care Provider: Vasu Kong MD  Principal Problem:Weakness of foot, right    Subjective:   Interval History: No acute events overnight. Crt remains elevated today. Net negative 1.1L in last 24hrs on IVP Bumex 2mg BID. Did also get 10mg Metolazone yesterday. Consulting Nephrology for ongoing renal failure and anemia. Had episode of hematuria. Feels fine otherwise.       Scheduled Meds:   amiodarone  400 mg Oral Daily    ampicillin (OMNIPEN) 2 g in sodium chloride 0.9 % 100 mL IVPB (MB+)  2 g Intravenous Q12H    atorvastatin  40 mg Oral QHS    bumetanide  2 mg Intravenous BID    cefTRIAXone (ROCEPHIN) 2 g in dextrose 5 % in water (D5W) 100 mL IVPB (MB+)  2 g Intravenous Q12H    famotidine  20 mg Oral Daily    fluticasone propionate  2 spray Each Nostril Daily    folic acid  1 mg Oral Daily    gabapentin  100 mg Oral BID    hydrALAZINE  100 mg Oral Q8H    magnesium oxide  400 mg Oral Daily    omega-3 acid ethyl esters  2 g Oral BID    traZODone  25 mg Oral QHS    venlafaxine  37.5 mg Oral Daily    vitamin D  1,000 Units Oral Daily    warfarin  2.5 mg Oral Daily     PRN Meds:  Current Facility-Administered Medications:     acetaminophen, 650 mg, Oral, Q6H PRN    senna-docusate 8.6-50 mg, 2 tablet, Oral, Daily PRN    Review of patient's allergies indicates:  No Known Allergies  Objective:     Vital Signs (Most Recent):  Temp: 97.4 °F (36.3 °C) (05/29/24 1134)  Pulse: 61 (05/29/24 1134)  Resp: 18 (05/29/24 1134)  BP: (!) 84/0 (05/29/24 0852)  SpO2: 99 % (05/29/24 1134) Vital Signs (24h Range):  Temp:  [96.7 °F (35.9 °C)-98.1 °F (36.7 °C)] 97.4 °F (36.3 °C)  Pulse:  [61-94] 61  Resp:  [16-18] 18  SpO2:  [93 %-99 %] 99 %  BP: ()/(0-88) 84/0     Patient Vitals for the past 72 hrs (Last 3  readings):   Weight   05/28/24 0200 84.4 kg (186 lb 1.1 oz)     Body mass index is 23.89 kg/m².      Intake/Output Summary (Last 24 hours) at 5/29/2024 1155  Last data filed at 5/29/2024 0555  Gross per 24 hour   Intake 480 ml   Output 1250 ml   Net -770 ml       Hemodynamic Parameters:       Telemetry: NSR        Physical Exam  Constitutional:       Appearance: Normal appearance.   HENT:      Head: Normocephalic and atraumatic.   Neck:      Comments: JVD present   Cardiovascular:      Rate and Rhythm: Normal rate and regular rhythm.      Comments: Smooth VAD hum   Pulmonary:      Effort: Pulmonary effort is normal.      Breath sounds: Normal breath sounds.   Abdominal:      General: Bowel sounds are normal.      Palpations: Abdomen is soft.   Musculoskeletal:      Cervical back: Normal range of motion and neck supple.      Right lower leg: Edema present.      Left lower leg: Edema present.      Comments: R foot weakness/some numbness    Neurological:      General: No focal deficit present.      Mental Status: He is alert and oriented to person, place, and time.      Motor: Weakness present.      Comments: R foot weakness             Significant Labs:  CBC:  Recent Labs   Lab 05/28/24  0559 05/28/24  1244 05/29/24  0530   WBC 5.46 5.44 4.99   RBC 2.29* 2.39* 2.11*   HGB 6.6* 6.9* 6.4*   HCT 21.7* 22.8* 20.0*    260 247   MCV 95 95 95   MCH 28.8 28.9 30.3   MCHC 30.4* 30.3* 32.0     BNP:  Recent Labs   Lab 05/23/24  0940 05/29/24  0530   BNP 1,425* 1,429*     CMP:  Recent Labs   Lab 05/27/24  1305 05/27/24  1636 05/28/24  0559 05/28/24  1117 05/28/24  1244 05/29/24  0530   * 131*   < > 222* 210* 106   CALCIUM 8.8 9.1   < > 8.7 9.3 9.2   ALBUMIN 2.8* 2.8*  --   --   --  2.6*   PROT 7.3 8.1  --   --   --  7.3    142   < > 146* 140 141   K 3.0* 3.1*   < > 3.9 4.2 4.1   CO2 28 29   < > 25 26 26   CL 95 97   < > 102 99 101   BUN 68* 66*   < > 59* 63* 67*   CREATININE 6.5* 6.6*   < > 6.0* 6.5* 6.4*    ALKPHOS 107 112  --   --   --  113   ALT 33 30  --   --   --  29   AST 28 27  --   --   --  26   BILITOT 0.2 0.2  --   --   --  0.2    < > = values in this interval not displayed.      Coagulation:   Recent Labs   Lab 05/27/24  1636 05/28/24  0559 05/29/24  0530   INR 4.2* 3.4* 2.5*   APTT 51.8* 49.3* 45.4*     LDH:  Recent Labs   Lab 05/28/24  0559 05/29/24  0530   * 356*     Microbiology:  Microbiology Results (last 7 days)       ** No results found for the last 168 hours. **            I have reviewed all pertinent labs within the past 24 hours.    Estimated Creatinine Clearance: 13.9 mL/min (A) (based on SCr of 6.4 mg/dL (H)).    Diagnostic Results:  I have reviewed all pertinent imaging results/findings within the past 24 hours.  Assessment and Plan:     Mr. Radha Abbott is a very pleasant 63 yo male with stage D HFrEF, ICMP with previous admission for ADHF/CS. He did undergo HM3 placement placed by Dr Washington on 12/1/23. Lengthy post op course complicated by vasoplegia (requiring Giaprezza), debility, malnutrition/impaired swallowing, and renal failure requiring HD(since weaned off). Once medically stable, he was transferred to Rehab for further PT/OT/ST. Of note, pt left with permacath still in place. He has done really well in rehab and now has continued to progress well at home. He is on a very high dose of Bumex 4 mg twice daily. VAD speed is at 5000 rpm. No VAD alarms noted on interrogation occasional PI events . He is currenlty on 6 week therapy for Enterococcus bacteremia with ampicillin and rocephin ending 5/30/24. He presented to the local ED with two falls. He says he hasn't been able to move his R foot well for two days and also can't feel well in it. He has been dragging the R foot when walking which led to mechanical falls and then he went to the ED. He also says weight went up by 2 pounds from 181 to 181 lbs and he got leg edema. No CP, SOB or LH reported. No other symptoms. CT head in OSH  is unremarkable so is xray foot. He was sent for neuro work up. His Cr is elevated as well.     * Weakness of foot, right  R foot motor and sensory weakness x 2 days  2 mechanical falls  CT head and xray foot unremarkable  Vascular neurology consulted-they suspect R common peroneal neuropathy. Unable to obtain MRI due to VAD   Neuropathy labs pending   PT/OT consult     LVAD (left ventricular assist device) present  Procedure: Device Interrogation Including analysis of device parameters  Current Settings: Ventricular Assist Device  Review of device function is stable    Cr is elevated, leg edema present and weight went up by 2 lbs  Home regimen is 4 bid po bumex with prn metolazone  Continue IV bumex 2 mg bid. Metolazone as needed   Monitor I/Os with daily weights   Obtain echo once euvolemic           5/29/2024     5:57 AM 5/28/2024    11:55 PM 5/28/2024     9:58 PM 5/28/2024    12:26 PM 5/28/2024     7:44 AM 5/28/2024     4:00 AM 5/28/2024     1:06 AM   TXP LVAD INTERROGATIONS   Type HeartMate3 HeartMate3 HeartMate3 HeartMate3 HeartMate3 HeartMate3 HeartMate3   Flow 3.8 3.8 4 3.6 4 3.8 3.9   Speed 5000 5000 5000 5050 5000 5000 5000   PI 4.9 6 4.8 7.2 4.4 5.7 5.9   Power ([place]) 3.3 3.3 3.4 3.4 3.4 3.4 3.4   LSL 4600 4600 4600 4600 4600 4600 4600   Pulsatility Intermittent pulse Intermittent pulse Intermittent pulse Pulse Pulse Intermittent pulse Pulse         Acute on chronic renal failure  SrCr 6.4 on admission  Baseline ~4 as of recent   Will consult Nephrology  Trend BMPs   Diuresis as above     Anticoagulant long-term use  Had epistaxis 2 days ago   INR elevated at 2.5  Hold coumadin for now    Anemia  -possibly secondary to dilutional anemia in the setting of hypervolemia.   -Also in the setting of renal failure  -Was supposed to be set up with Epo injections as outpatient   -H&H 6.4/20  -Did report some hematuria today which has already resolved. No other overt signs of bleeding   -Trend  CBCs    Bacteremia  On 6 week course of ampicillin and Rocephin till 5/30/24  WBC normal       Acute on chronic combined systolic and diastolic heart failure  - plan as above     Ventricular tachycardia  Cont home sonamo         Hugh Duran NP  Heart Transplant  [facility] - Cardiology Stepdown

## 2024-05-29 NOTE — ASSESSMENT & PLAN NOTE
- management per primary team  - s/p Heartmate III LVAD placed on 12/01/2023 currently at 5,000 RPMs

## 2024-05-29 NOTE — PT/OT/SLP PROGRESS
Physical Therapy Treatment    Patient Name:  Radha Abbott   MRN:  [id number]  Admitting Diagnosis:  Weakness of foot, right   Recent Surgery: * No surgery found *    Admit Date: 5/28/2024  Length of Stay: 1 days    Recommendations:     Discharge Recommendations:  Low Intensity Therapy     Discharge Equipment Recommendations: walker, rolling   Barriers to discharge: None    Appropriate transfer level with nursing staff: Ambulatory with CGA with RW    Plan:     During this hospitalization, patient to be seen 3 x/week to address the identified rehab impairments via gait training, therapeutic activities, therapeutic exercises, neuromuscular re-education and progress towards the established goals.  Plan of Care Expires:  06/28/24  Plan of Care Reviewed with: patient, spouse    Assessment:     Radha Abbott is a 64 y.o. male admitted with a medical diagnosis of Weakness of foot, right. Pt found up in chair with R AFO donned agreeable to therapy session. Pt demo'd improved clearance of RLE during gait trial with decreased R hip and knee flexion required for clearance. Patient remains limited by decreased endurance at this time. Patient would benefit from skilled therapy services to maximize safety and independence, increase activity tolerance, decrease fall risk, decrease caregiver burden, improve QOL, improve patient's functional mobility, and decrease risk of contractures and pressure sores.  Patient continues to demonstrate the need for low intensity therapy on a scheduled basis exhibited by decreased independence with functional mobility     Problem List: weakness, impaired endurance, impaired self care skills, impaired functional mobility, gait instability, impaired balance, decreased upper extremity function, decreased lower extremity function, pain, decreased ROM, edema.  Rehab Prognosis: Good; patient would benefit from acute skilled PT services to address these deficits and reach maximum level of function.      Goals:  "  Multidisciplinary Problems       Physical Therapy Goals          Problem: Physical Therapy    Goal Priority Disciplines Outcome Goal Variances Interventions   Physical Therapy Goal     PT, PT/OT Progressing     Description: Goals to be met by: 24     Patient will increase functional independence with mobility by performin. Supine to sit with [place]  2. Sit to stand transfer with Modified [place]  3. Bed to chair transfer with Modified [place] using LRAD  4. Gait  x 200 feet with Supervision using LRAD.   5. Stand for 10 minutes with Supervision using LRAD  6. Lower extremity exercise program x15 reps per handout, with independence                         Subjective     RN notified prior to session. Wife present upon PT entrance into room. Patient agreeable to PT treatment session.    Chief Complaint: "you can't get any sleep around here"  Patient/Family Comments/goals: go home  Pain/Comfort:  Pain Rating 1: other (see comments) (unrated)  Location - Side 1: Right  Location - Orientation 1: lateral  Location 1: foot  Pain Addressed 1: Reposition, Distraction      Objective:     Patient found up in chair with: telemetry, LVAD   Cognition:   Alert and Cooperative  Patient is oriented to Person, Place, Time, Situation  General Precautions: Standard, Cardiac fall, LVAD, contact   Orthopedic Precautions:N/A   Braces: AFO (RLE)   Body mass index is 23.89 kg/m².  Oxygen Device: Room Air  Vitals: BP (!) 84/0   Pulse 87   Temp 98.1 °F (36.7 °C) (Oral)   Resp 18   Wt 84.4 kg (186 lb 1.1 oz)   SpO2 (!) 94%   BMI 23.89 kg/m²     Outcome Measures:  AM-PAC 6 CLICK MOBILITY  Turning over in bed (including adjusting bedclothes, sheets and blankets)?: 4  Sitting down on and standing up from a chair with arms (e.g., wheelchair, bedside commode, etc.): 3  Moving from lying on back to sitting on the side of the bed?: 4  Moving to and from a bed to a chair (including a wheelchair)?: 3  Need to walk in " hospital room?: 3  Climbing 3-5 steps with a railing?: 2  Basic Mobility Total Score: 19     Functional Mobility:    Bed Mobility:   Pt found/returned to bedside chair    Transfers:   Sit <> Stand Transfer: minimum assistance with rolling walker     Balance:  Standing:  Static: stand by assistance  Dynamic: contact guard assistance      Gait:  Patient ambulated: 18'   Patient required: contact guard  Patient used:  rolling walker   Gait Deviation(s): steady gait, decreased step length, decreased weight shift, decreased foot clearance, flexed posture, and decreased gina  all lines remained intact throughout ambulation trial; R AFO donned for gait trial  LVAD: wall power used throughout session  Comments: Patient slow but steady throughout trial. Pt with improved clearance of R foot compared to previous session.     Education:  Time provided for education, counseling and discussion of health disposition in regards to patient's current status  All questions answered within PT scope of practice and to patient's satisfaction  PT role in POC to address current functional deficits  Pt educated on using R AFO for all out of bed mobility     Patient left up in chair with all lines intact, call button in reach, and wife present.    Time Tracking:     PT Received On: 05/29/24  PT Start Time: 0945     PT Stop Time: 0957  PT Total Time (min): 12 min     Billable Minutes:   Gait Training 8 minutes    Treatment Type: Treatment  PT/PTA: PT       5/29/2024

## 2024-05-29 NOTE — NURSING
Pt returned to room. Pt AAOx4. Pt stable. No complaints of pain or signs of distress. LVAD equipment with patient

## 2024-05-29 NOTE — PLAN OF CARE
Problem: Adult Inpatient Plan of Care  Goal: Patient-Specific Goal (Individualized)  Outcome: Progressing  Flowsheets (Taken 5/29/2024 1706)  Individualized Care Needs: monitor labs, VS, Dopplers, etc.  Anxieties, Fears or Concerns: none     Problem: Pneumonia  Goal: Effective Oxygenation and Ventilation  Outcome: Progressing  Intervention: Promote Airway Secretion Clearance  Flowsheets (Taken 5/29/2024 1706)  Breathing Techniques/Airway Clearance: deep/controlled cough encouraged  Cough And Deep Breathing: done independently per patient  Intervention: Optimize Oxygenation and Ventilation  Flowsheets (Taken 5/29/2024 1706)  Airway/Ventilation Management:   airway patency maintained   pulmonary hygiene promoted  Head of Bed (HOB) Positioning: HOB elevated     Problem: Fall Injury Risk  Goal: Absence of Fall and Fall-Related Injury  Outcome: Progressing  Intervention: Identify and Manage Contributors  Flowsheets (Taken 5/29/2024 1706)  Self-Care Promotion:   independence encouraged   BADL personal objects within reach   BADL personal routines maintained   meal set-up provided  Medication Review/Management: medications reviewed  Intervention: Promote Injury-Free Environment  Flowsheets (Taken 5/29/2024 1706)  Safety Promotion/Fall Prevention:   assistive device/personal item within reach   Fall Risk reviewed with patient/family   Fall Risk signage in place   medications reviewed   nonskid shoes/socks when out of bed   side rails raised x 2   room near unit station   AAOX4, VSS. Doppler WNL. Plan of care discussed with patient. Patient has no complaints of chest pain/SOB/palpitations. Pt ambulating independently, fall precautions in place,no falls/injuries through the shift.Discussed medications and care.Patient has no questions at this time.Pt resting comfortably with no acute distress.Call light within reach,bed at lowest position.

## 2024-05-29 NOTE — ASSESSMENT & PLAN NOTE
SrCr 6.4 on admission  Baseline ~4 as of recent   Will consult Nephrology  Trend BMPs   Diuresis as above

## 2024-05-29 NOTE — NURSING
Pt taken to US. Pt AAOx4. Pt stable. No complaints of pain or signs of distress. Left per wheelchair with transport. LVAD equipment with patient.

## 2024-05-29 NOTE — PT/OT/SLP PROGRESS
Occupational Therapy   Treatment    Name: Radha Abbott  MRN: [id number]  Admitting Diagnosis:  Weakness of foot, right       Recommendations:     Discharge Recommendations: Low Intensity Therapy  Discharge Equipment Recommendations:  walker, rolling  Barriers to discharge:  None    Assessment:     Radha Abbott is a 64 y.o. male with a medical diagnosis of Weakness of foot, right.  He presents with the following performance deficits affecting function are weakness, impaired endurance, impaired self care skills, impaired functional mobility, gait instability, impaired balance, decreased lower extremity function, decreased upper extremity function, pain, decreased ROM, edema, impaired cardiopulmonary response to activity.     Rehab Prognosis:  Good; patient would benefit from acute skilled OT services to address these deficits and reach maximum level of function.       Plan:     Patient to be seen 3 x/week to address the above listed problems via self-care/home management, therapeutic activities, therapeutic exercises, neuromuscular re-education  Plan of Care Expires: 06/27/24  Plan of Care Reviewed with: patient    Subjective     Chief Complaint: (R) foot pain  Patient/Family Comments/goals: to return to PLOF  Pain/Comfort:  Pain Rating 1: other (see comments) (pt did not rate)  Location - Side 1: Right  Location - Orientation 1: lateral  Location 1: foot  Pain Addressed 1: Reposition, Distraction  Pain Rating Post-Intervention 1: other (see comments) (pt did not rate)    Objective:     Communicated with: RN prior to session.  Patient found up in chair with LVAD, telemetry upon OT entry to room.    General Precautions: Standard, fall, contact, LVAD    Orthopedic Precautions:N/A  Braces: AFO ((R) LE)  Respiratory Status: Room air     Occupational Performance:     Bed Mobility:    Not assessed- pt met sitting in chair/returned to chair end of session    Functional Mobility/Transfers:  Patient completed Sit <> Stand Transfer  with contact guard assistance  with  rolling walker   Functional Mobility: Pt engaged in functional mobility to simulate household/community distances 25 ft in room with CGA progressing to close SBA and RW in order to maximize functional endurance and standing balance required for engagement in occupations of choice   No LOB or SOB noted    Activities of Daily Living:  Lower Body Dressing: stand by assistance to don/doff shoe with AFO while sitting in chair      [facility] 6 Click ADL: 20    Treatment & Education:  -Education on energy conservation and task modification to maximize safety and (I) during ADLs and mobility  -Education on importance of OOB activity to improve overall activity tolerance and promote recovery  -Pt educated to call for assistance and to transfer with hospital staff only  -Provided education regarding role of OT, POC, & discharge recommendations with pt verbalizing understanding.  Pt had no further questions & when asked whether there were any concerns pt reported none.     Patient left up in chair with all lines intact, call button in reach, and RN notified    GOALS:   Multidisciplinary Problems       Occupational Therapy Goals          Problem: Occupational Therapy    Goal Priority Disciplines Outcome Interventions   Occupational Therapy Goal     OT, PT/OT Progressing    Description: Goals to be met by: 6/11/2024     Patient will increase functional independence with ADLs by performing:    UE Dressing with Modified [place].  LE Dressing with Modified [place].  Grooming while standing at sink with Modified [place].  Toileting from toilet with Modified [place] for hygiene and clothing management.   Step transfer with Modified [place]  Toilet transfer to toilet with Modified [place].  Upper extremity exercise program x12 reps per handout, with independence.   Pt will perform functional mobility for household and community distances with Mod I and LRAD required for  independence in occupations of choice.                          Time Tracking:     OT Date of Treatment: 05/29/24  OT Start Time: 1105  OT Stop Time: 1120  OT Total Time (min): 15 min    Billable Minutes:Therapeutic Activity 15    OT/PRIETO: OT          5/29/2024   15

## 2024-05-29 NOTE — PROGRESS NOTES
05/28/24 2321 05/29/24 0000   Vital Signs   /81 (!) 98/0   MAP (mmHg) 91  --    BP Location Right arm Right arm   BP Method Automatic Doppler   Patient Position Lying Lying     Patient MAP and doppler elevated. Unable to reach MD x2. Will recheck and continue to monitor.

## 2024-05-29 NOTE — CONSULTS
[facility] - Cardiology Stepdown  Nephrology  Consult Note    Patient Name: Radha Abbott  MRN: [id number]  Admission Date: 5/28/2024  Hospital Length of Stay: 1 days  Attending Provider: Sajan Hurley, *   Primary Care Physician: Vasu Kong MD  Principal Problem:Weakness of foot, right    Inpatient consult to Nephrology  Consult performed by: Henok Ayon MD  Consult ordered by: Hugh Duran NP        Subjective:     HPI: Mr. Abbott is a 64-year-old man with ischemic cardiomyopathy with most recent TTE showing EF 10 -15% and G3DD s/p [employer] ICM placement previously on chronic dobutamine infusion however now with Heartmate III LVAD placed on 12/01/2023, CAD s/p x3 vessel CABG back in 2009, type 2 diabetes mellitus (most recent hemoglobin A1c 7.4%), hypertension, HLD, history of ventricular fibrillation for which he is on amiodarone, chronic AC with Coumadin, Enterococcus bacteremia on intravenous Rocephin and ampicillin, advanced CKD V (baseline creatinine ~5 at best) with recurrent AKIs and previous dialysis dependence (tunneled HD catheter removed in early March of this year) who was admitted 5/28 after presented to OSH ED with complaints of frequent falls secondary to right foot immobility/movement issue concerning for drop foot in addition to increased weight and worsening lower extremity edema. CBC was notable for hemoglobin 6.5. Metabolic panel on presentation was notable for serum potassium 3, BUN 68 and creatinine 6.5. Chest x-ray showed enlarged cardiac silhouette, increased pulmonary vascular and interstitial markings and probable new left pleural effusion and BNP was ~1.4K. He was management with Bumex 2 mg IV BID in addition to receiving metolazone 10 mg PO. He is making urine with ~1.7 liters documented thus far. UA with +1 protein (UPCR 1.6 grams) and urine sodium 125. Retroperitoneal ultrasound pending at this time. Nephrology has been consulted for assistance with management  of IVÁN on CKD.    Past Medical History:   Diagnosis Date    CAD (coronary artery disease)     CHF (congestive heart failure)     Diabetes mellitus     HFrEF (heart failure with reduced ejection fraction)     ICD (implantable cardioverter-defibrillator) in place     MI, old        Past Surgical History:   Procedure Laterality Date    ANGIOPLASTY-VENOUS ARTERY Right 12/1/2023    Procedure: ANGIOPLASTY-VENOUS ARTERY, RIGHT FEMORAL;  Surgeon: Yuri Washington MD;  [facility];  Service: Cardiovascular;  Laterality: Right;    AORTIC VALVULOPLASTY N/A 12/1/2023    Procedure: REPAIR, AORTIC VALVE;  Surgeon: Yuri Washington MD;  [facility];  Service: Cardiovascular;  Laterality: N/A;    CARDIAC SURGERY      CLOSURE N/A 12/1/2023    Procedure: CLOSURE, TEMPORARY;  Surgeon: Yuri Washington MD;  [facility];  Service: Cardiovascular;  Laterality: N/A;    DRAINAGE OF PLEURAL EFFUSION  12/4/2023    Procedure: DRAINAGE, PLEURAL EFFUSION;  Surgeon: Yuri Washington MD;  Location: [facility] OR [address] [place] [address];  Service: Cardiovascular;;    INSERTION OF GRAFT TO PERICARDIUM  12/4/2023    Procedure: INSERTION, GRAFT, PERICARDIUM;  Surgeon: Yuri Washington MD;  Location: [facility] OR [address] [place] [address];  Service: Cardiovascular;;    INSERTION OF INTRA-AORTIC BALLOON ASSIST DEVICE Right 11/21/2023    Procedure: INSERTION, INTRA-AORTIC BALLOON PUMP;  Surgeon: Finn Cohn MD;  Location: [facility] CATH LAB;  Service: Cardiology;  Laterality: Right;    LEFT VENTRICULAR ASSIST DEVICE Left 12/1/2023    Procedure: INSERTION-LEFT VENTRICULAR ASSIST DEVICE;  Surgeon: Yuri Washington MD;  Location: [facility] OR [address] [place] [address];  Service: Cardiovascular;  Laterality: Left;  REDO STERNOTOMY - REDO SAW NEEDED FOR CASE    LYSIS OF ADHESIONS  12/1/2023    Procedure: LYSIS, ADHESIONS;  Surgeon: Yuri Washington MD;  Location: [facility] OR [address] [place] [address];  Service: Cardiovascular;;    PLACEMENT OF SWAN ROLANDO CATHETER WITH IMAGING GUIDANCE  11/20/2023     Procedure: INSERTION, CATHETER, SWAN-ROLANDO, WITH IMAGING GUIDANCE;  Surgeon: Sajan Hurley MD;  Location: [facility] CATH LAB;  Service: Cardiology;;    REMOVAL OF TUNNELED CENTRAL VENOUS CATHETER (CVC) N/A 3/1/2024    Procedure: REMOVAL, CATHETER, CENTRAL VENOUS, TUNNELED;  Surgeon: Seble Aguilar MD;  Location: [facility] CATH LAB;  Service: Interventional Nephrology;  Laterality: N/A;    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 12/1/2023    Procedure: REPAIR, ANEURYSM, ARTERY, FEMORAL;  Surgeon: Yuri Washington MD;  Location: [address];  Service: Cardiovascular;  Laterality: Right;  Right Femoral Artery Repair    RIGHT HEART CATHETERIZATION Right 10/10/2023    Procedure: INSERTION, CATHETER, RIGHT HEART;  Surgeon: Bin Gandhi MD;  Location: [facility] CATH LAB;  Service: Cardiology;  Laterality: Right;    RIGHT HEART CATHETERIZATION Right 10/13/2023    Procedure: INSERTION, CATHETER, RIGHT HEART;  Surgeon: Walter Mcintyre MD;  Location: [facility] CATH LAB;  Service: Cardiology;  Laterality: Right;    RIGHT HEART CATHETERIZATION  11/13/2023    RIGHT HEART CATHETERIZATION Right 11/13/2023    Procedure: INSERTION, CATHETER, RIGHT HEART;  Surgeon: Juventino Bermudez Jr., MD;  Location: [facility] CATH LAB;  Service: Cardiology;  Laterality: Right;    RIGHT HEART CATHETERIZATION Right 11/20/2023    Procedure: INSERTION, CATHETER, RIGHT HEART;  Surgeon: Sajan Hurley MD;  Location: [facility] CATH LAB;  Service: Cardiology;  Laterality: Right;    RIGHT HEART CATHETERIZATION Right 1/22/2024    Procedure: INSERTION, CATHETER, RIGHT HEART;  Surgeon: Brayan Ocampo MD;  Location: [facility] CATH LAB;  Service: Cardiology;  Laterality: Right;    STERNAL WOUND CLOSURE N/A 12/4/2023    Procedure: CLOSURE, WOUND, STERNUM;  Surgeon: Yuri Washington MD;  [address]  Service: Cardiovascular;  Laterality: N/A;    STERNOTOMY N/A 12/1/2023    Procedure: STERNOTOMY, REDO;  Surgeon: Yuri Washington MD;  Location: [address]  [facility];  Service: Cardiovascular;  Laterality: N/A;    VALVULOPLASTY, MITRAL VALVE N/A 12/1/2023    Procedure: VALVULOPLASTY, MITRAL VALVE;  Surgeon: Yuri Washington MD;  Location: [facility] OR [address] [place] [address];  Service: Cardiovascular;  Laterality: N/A;       Review of patient's allergies indicates:  No Known Allergies  Current Facility-Administered Medications   Medication Frequency    acetaminophen tablet 650 mg Q6H PRN    amiodarone tablet 400 mg Daily    ampicillin (OMNIPEN) 2 g in sodium chloride 0.9 % 100 mL IVPB (MB+) Q12H    atorvastatin tablet 40 mg QHS    bumetanide injection 2 mg BID    cefTRIAXone (ROCEPHIN) 2 g in dextrose 5 % in water (D5W) 100 mL IVPB (MB+) Q12H    famotidine tablet 20 mg Daily    fluticasone propionate 50 mcg/actuation nasal spray 100 mcg Daily    folic acid tablet 1 mg Daily    gabapentin capsule 100 mg BID    hydrALAZINE tablet 100 mg Q8H    magnesium oxide tablet 400 mg Daily    omega-3 acid ethyl esters capsule 2 g BID    senna-docusate 8.6-50 mg per tablet 2 tablet Daily PRN    trazodone split tablet 25 mg QHS    venlafaxine tablet 37.5 mg Daily    vitamin D 1000 units tablet 1,000 Units Daily    warfarin (COUMADIN) tablet 2.5 mg Daily     Family History    None       Tobacco Use    Smoking status: Former     Current packs/day: 0.50     Types: Cigarettes    Smokeless tobacco: Not on file   Substance and Sexual Activity    Alcohol use: Yes     Comment: rarely    Drug use: No    Sexual activity: Not Currently     Partners: Female     Review of Systems   Constitutional:  Positive for activity change, fatigue and unexpected weight change. Negative for appetite change, chills and fever.   HENT:  Negative for sore throat and trouble swallowing.    Eyes:  Negative for pain and visual disturbance.   Respiratory:  Negative for cough, shortness of breath and wheezing.    Cardiovascular:  Positive for leg swelling. Negative for chest pain and palpitations.   Gastrointestinal:  Negative for abdominal  distention, anal bleeding, constipation, diarrhea, nausea and vomiting.   Genitourinary:  Negative for decreased urine volume, difficulty urinating, flank pain, frequency, hematuria and urgency.   Musculoskeletal:  Positive for gait problem. Negative for joint swelling and neck pain.   Skin:  Negative for rash and wound.   Neurological:  Positive for weakness and numbness. Negative for tremors, syncope, speech difficulty and headaches.   Hematological:  Bruises/bleeds easily.   Psychiatric/Behavioral:  Negative for confusion. The patient is not nervous/anxious.      Objective:     Vital Signs (Most Recent):  Temp: 98.1 °F (36.7 °C) (05/29/24 0736)  Pulse: 82 (05/29/24 1111)  Resp: 18 (05/29/24 0736)  BP: (!) 84/0 (05/29/24 0852)  SpO2: (!) 94 % (05/29/24 0932) Vital Signs (24h Range):  Temp:  [96.7 °F (35.9 °C)-98.1 °F (36.7 °C)] 98.1 °F (36.7 °C)  Pulse:  [81-94] 82  Resp:  [16-18] 18  SpO2:  [93 %-98 %] 94 %  BP: ()/(0-88) 84/0     Weight:  (patient refused) (05/29/24 0540)  Body mass index is 23.89 kg/m².  Body surface area is 2.1 meters squared.    I/O last 3 completed shifts:  In: 1020 [P.O.:1020]  Out: 1725 [Urine:1725]     Physical Exam  Vitals and nursing note reviewed.   Constitutional:       General: He is awake. He is not in acute distress.     Appearance: He is ill-appearing. He is not diaphoretic.   HENT:      Head: Normocephalic and atraumatic.      Right Ear: External ear normal.      Left Ear: External ear normal.      Nose: Nose normal.      Mouth/Throat:      Mouth: Mucous membranes are moist.      Pharynx: Oropharynx is clear. No oropharyngeal exudate or posterior oropharyngeal erythema.   Eyes:      General: No scleral icterus.        Right eye: No discharge.         Left eye: No discharge.      Extraocular Movements: Extraocular movements intact.      Conjunctiva/sclera: Conjunctivae normal.   Neck:      Comments: Trialysis catheter to left internal jugular vein.   Cardiovascular:       Rate and Rhythm: Normal rate.      Heart sounds: Murmur (humming sound, VAD) heard.      Systolic murmur is present.      No friction rub. No gallop.      Comments: There is bilateral pitting lower extremity edema which extends proximally.  Pulmonary:      Effort: Pulmonary effort is normal. No respiratory distress.      Breath sounds: Rales present. No wheezing or rhonchi.      Comments: Faint bibasilar crackles appreciated.  Chest:      Comments: Drive line to right lower quadrant. Tunneled catheter to right chest wall.  Abdominal:      General: Bowel sounds are normal. There is no distension.      Palpations: Abdomen is soft.   Musculoskeletal:      Cervical back: Neck supple.      Right lower le+ Pitting Edema present.      Left lower le+ Pitting Edema present.   Skin:     General: Skin is warm and dry.      Coloration: Skin is not jaundiced.   Neurological:      General: No focal deficit present.      Mental Status: He is alert. Mental status is at baseline.      Cranial Nerves: No cranial nerve deficit.      Motor: Weakness present.   Psychiatric:         Mood and Affect: Mood normal.         Behavior: Behavior normal. Behavior is cooperative.          Significant Labs:  BMP:   Recent Labs   Lab 24  0530         K 4.1      CO2 26   BUN 67*   CREATININE 6.4*   CALCIUM 9.2   MG 1.9     CBC:   Recent Labs   Lab 24  0530   WBC 4.99   RBC 2.11*   HGB 6.4*   HCT 20.0*      MCV 95   MCH 30.3   MCHC 32.0     CMP:   Recent Labs   Lab 24  0530      CALCIUM 9.2   ALBUMIN 2.6*   PROT 7.3      K 4.1   CO2 26      BUN 67*   CREATININE 6.4*   ALKPHOS 113   ALT 29   AST 26   BILITOT 0.2     Coagulation:   Recent Labs   Lab 24  0530   INR 2.5*   APTT 45.4*     LFTs:   Recent Labs   Lab 24  0530   ALT 29   AST 26   ALKPHOS 113   BILITOT 0.2   PROT 7.3   ALBUMIN 2.6*     TSH:   Recent Labs   Lab 24  0940   TSH 0.842     Recent Labs   Lab  05/29/24  1243   COLORU Colorless*   SPECGRAV 1.010   PHUR 7.0   PROTEINUA 1+*   BACTERIA None   NITRITE Negative   LEUKOCYTESUR Negative   HYALINECASTS 0       Significant Imaging:  I have reviewed all imagining in the last 24 hours.  Assessment/Plan:     Cardiac/Vascular  LVAD (left ventricular assist device) present  Acute on chronic combined systolic and diastolic heart failure  - management per primary team  - s/p Heartmate III LVAD placed on 12/01/2023 currently at 5,000 RPMs    Renal/  Acute renal failure superimposed on stage 4 chronic kidney disease  - patient with progressing advanced CKD V (baseline creatinine ~5 at best) with recurrent AKIs and previous dialysis dependence (tunneled HD catheter removed in early March of this year)   - his renally function likely reflects progression of his CKD in setting of CRS type 2, his urinary sediment was largely bland   - will follow-up retroperitoneal US  - can continue Bumex 2 mg IV BID and loop diuretic if warranted by primary team  - favor serial RFPs and magnesium levels in light of active diuresis   - please avoid hypotension/major fluctuations in BP (MAP > 65 mmHg)  - renal diet when not NPO  - strict I/O's and daily weights  - renally all dose medications to eGFR   - avoid nephrotoxic agents wean feasible (i.e. NSAIDs, intra-arterial contrast, supra-therapeutic vancomycin levels, etc.)  - currently there are no acute indications for RRT at this time however will continue to monitor closely    Thank you for your consult. I will follow-up with patient. Please contact us if you have any additional questions.    Henok Ayon MD  Nephrology  [facility] - Cardiology Stepdown

## 2024-05-29 NOTE — PROGRESS NOTES
05/29/2024  Monet Aguiar    Current provider:  Sajan Hurley, *    Device interrogation:      5/29/2024     5:57 AM 5/28/2024    11:55 PM 5/28/2024     9:58 PM 5/28/2024    12:26 PM 5/28/2024     7:44 AM 5/28/2024     4:00 AM 5/28/2024     1:06 AM   TXP LVAD INTERROGATIONS   Type HeartMate3 HeartMate3 HeartMate3 HeartMate3 HeartMate3 HeartMate3 HeartMate3   Flow 3.8 3.8 4 3.6 4 3.8 3.9   Speed 5000 5000 5000 5050 5000 5000 5000   PI 4.9 6 4.8 7.2 4.4 5.7 5.9   Power ([place]) 3.3 3.3 3.4 3.4 3.4 3.4 3.4   LSL 4600 4600 4600 4600 4600 4600 4600   Pulsatility Intermittent pulse Intermittent pulse Intermittent pulse Pulse Pulse Intermittent pulse Pulse          Rounded on [facility] to ensure all mechanical assist device settings (IABP or VAD) were appropriate and all parameters were within limits.  I was able to ensure all back up equipment was present, the staff had no issues, and the Perfusion Department daily rounding was complete.      For implantable VADs: Interrogation of Ventricular assist device was performed with analysis of device parameters and review of device function. I have personally reviewed the interrogation findings and agree with findings as stated.     In emergency, the nursing units have been notified to contact the perfusion department either by:  Calling [phone] from 630am to 4pm Mon thru Fri, utilizing the On-Call Finder functionality of Epic and searching for Perfusion, or by contacting the hospital  from 4pm to 630am and on weekends and asking to speak with the perfusionist on call.    11:25 AM

## 2024-05-30 PROBLEM — Z45.2 ENCOUNTER FOR REMOVAL OF TUNNELED CENTRAL VENOUS CATHETER (CVC) WITH PORT: Status: ACTIVE | Noted: 2024-05-30

## 2024-05-30 LAB
ANION GAP SERPL CALC-SCNC: 12 MMOL/L (ref 8–16)
ANISOCYTOSIS BLD QL SMEAR: SLIGHT
APTT PPP: 47.1 SEC (ref 21–32)
BASOPHILS # BLD AUTO: ABNORMAL K/UL (ref 0–0.2)
BASOPHILS NFR BLD: 0 % (ref 0–1.9)
BUN SERPL-MCNC: 61 MG/DL (ref 8–23)
CALCIUM SERPL-MCNC: 9.6 MG/DL (ref 8.7–10.5)
CHLORIDE SERPL-SCNC: 99 MMOL/L (ref 95–110)
CO2 SERPL-SCNC: 29 MMOL/L (ref 23–29)
CREAT SERPL-MCNC: 6.1 MG/DL (ref 0.5–1.4)
DIFFERENTIAL METHOD BLD: ABNORMAL
EOSINOPHIL # BLD AUTO: ABNORMAL K/UL (ref 0–0.5)
EOSINOPHIL NFR BLD: 2 % (ref 0–8)
ERYTHROCYTE [DISTWIDTH] IN BLOOD BY AUTOMATED COUNT: 18.6 % (ref 11.5–14.5)
EST. GFR  (NO RACE VARIABLE): 9.7 ML/MIN/1.73 M^2
GLUCOSE SERPL-MCNC: 114 MG/DL (ref 70–110)
HCT VFR BLD AUTO: 20.6 % (ref 40–54)
HGB BLD-MCNC: 6.5 G/DL (ref 14–18)
HYPOCHROMIA BLD QL SMEAR: ABNORMAL
IMM GRANULOCYTES # BLD AUTO: ABNORMAL K/UL (ref 0–0.04)
IMM GRANULOCYTES NFR BLD AUTO: ABNORMAL % (ref 0–0.5)
INR PPP: 2.6 (ref 0.8–1.2)
LDH SERPL L TO P-CCNC: 367 U/L (ref 110–260)
LYMPHOCYTES # BLD AUTO: ABNORMAL K/UL (ref 1–4.8)
LYMPHOCYTES NFR BLD: 6 % (ref 18–48)
MAGNESIUM SERPL-MCNC: 1.8 MG/DL (ref 1.6–2.6)
MCH RBC QN AUTO: 29.8 PG (ref 27–31)
MCHC RBC AUTO-ENTMCNC: 31.6 G/DL (ref 32–36)
MCV RBC AUTO: 95 FL (ref 82–98)
MONOCYTES # BLD AUTO: ABNORMAL K/UL (ref 0.3–1)
MONOCYTES NFR BLD: 4 % (ref 4–15)
NEUTROPHILS NFR BLD: 88 % (ref 38–73)
NRBC BLD-RTO: 0 /100 WBC
OVALOCYTES BLD QL SMEAR: ABNORMAL
PHOSPHATE SERPL-MCNC: 4.5 MG/DL (ref 2.7–4.5)
PLATELET # BLD AUTO: 244 K/UL (ref 150–450)
PMV BLD AUTO: 10.3 FL (ref 9.2–12.9)
POIKILOCYTOSIS BLD QL SMEAR: SLIGHT
POLYCHROMASIA BLD QL SMEAR: ABNORMAL
POTASSIUM SERPL-SCNC: 3.8 MMOL/L (ref 3.5–5.1)
PROTHROMBIN TIME: 27.2 SEC (ref 9–12.5)
RBC # BLD AUTO: 2.18 M/UL (ref 4.6–6.2)
SODIUM SERPL-SCNC: 140 MMOL/L (ref 136–145)
SPHEROCYTES BLD QL SMEAR: ABNORMAL
WBC # BLD AUTO: 5.23 K/UL (ref 3.9–12.7)

## 2024-05-30 PROCEDURE — 27000248 HC VAD-ADDITIONAL DAY

## 2024-05-30 PROCEDURE — 27000207 HC ISOLATION

## 2024-05-30 PROCEDURE — 84100 ASSAY OF PHOSPHORUS: CPT | Performed by: INTERNAL MEDICINE

## 2024-05-30 PROCEDURE — 25000003 PHARM REV CODE 250: Performed by: INTERNAL MEDICINE

## 2024-05-30 PROCEDURE — 99222 1ST HOSP IP/OBS MODERATE 55: CPT | Mod: 25,,,

## 2024-05-30 PROCEDURE — 99233 SBSQ HOSP IP/OBS HIGH 50: CPT | Mod: ,,, | Performed by: REGISTERED NURSE

## 2024-05-30 PROCEDURE — 63600175 PHARM REV CODE 636 W HCPCS: Performed by: INTERNAL MEDICINE

## 2024-05-30 PROCEDURE — 85610 PROTHROMBIN TIME: CPT | Performed by: INTERNAL MEDICINE

## 2024-05-30 PROCEDURE — 85730 THROMBOPLASTIN TIME PARTIAL: CPT | Performed by: INTERNAL MEDICINE

## 2024-05-30 PROCEDURE — 80048 BASIC METABOLIC PNL TOTAL CA: CPT | Performed by: INTERNAL MEDICINE

## 2024-05-30 PROCEDURE — 20600001 HC STEP DOWN PRIVATE ROOM

## 2024-05-30 PROCEDURE — 83735 ASSAY OF MAGNESIUM: CPT | Performed by: INTERNAL MEDICINE

## 2024-05-30 PROCEDURE — 93750 INTERROGATION VAD IN PERSON: CPT | Mod: ,,, | Performed by: INTERNAL MEDICINE

## 2024-05-30 PROCEDURE — 25000003 PHARM REV CODE 250: Performed by: REGISTERED NURSE

## 2024-05-30 PROCEDURE — 83615 LACTATE (LD) (LDH) ENZYME: CPT | Performed by: INTERNAL MEDICINE

## 2024-05-30 PROCEDURE — 85025 COMPLETE CBC W/AUTO DIFF WBC: CPT | Performed by: INTERNAL MEDICINE

## 2024-05-30 PROCEDURE — 63600175 PHARM REV CODE 636 W HCPCS: Mod: JZ,EC,JG | Performed by: INTERNAL MEDICINE

## 2024-05-30 RX ORDER — AMOXICILLIN 500 MG/1
500 CAPSULE ORAL EVERY 12 HOURS
Status: DISCONTINUED | OUTPATIENT
Start: 2024-05-30 | End: 2024-05-31 | Stop reason: HOSPADM

## 2024-05-30 RX ORDER — METOLAZONE 5 MG/1
10 TABLET ORAL DAILY
Status: DISCONTINUED | OUTPATIENT
Start: 2024-05-30 | End: 2024-05-31 | Stop reason: HOSPADM

## 2024-05-30 RX ADMIN — GABAPENTIN 100 MG: 100 CAPSULE ORAL at 09:05

## 2024-05-30 RX ADMIN — Medication 400 MG: at 09:05

## 2024-05-30 RX ADMIN — HYDRALAZINE HYDROCHLORIDE 100 MG: 50 TABLET ORAL at 09:05

## 2024-05-30 RX ADMIN — HYDRALAZINE HYDROCHLORIDE 100 MG: 50 TABLET ORAL at 02:05

## 2024-05-30 RX ADMIN — CEFTRIAXONE 2 G: 2 INJECTION, POWDER, FOR SOLUTION INTRAMUSCULAR; INTRAVENOUS at 11:05

## 2024-05-30 RX ADMIN — BUMETANIDE 2 MG: 0.25 INJECTION INTRAMUSCULAR; INTRAVENOUS at 09:05

## 2024-05-30 RX ADMIN — VENLAFAXINE 37.5 MG: 37.5 TABLET ORAL at 09:05

## 2024-05-30 RX ADMIN — AMPICILLIN 2 G: 2 INJECTION, POWDER, FOR SOLUTION INTRAMUSCULAR; INTRAVENOUS at 10:05

## 2024-05-30 RX ADMIN — HYDRALAZINE HYDROCHLORIDE 100 MG: 50 TABLET ORAL at 05:05

## 2024-05-30 RX ADMIN — OMEGA-3-ACID ETHYL ESTERS 2 G: 1 CAPSULE, LIQUID FILLED ORAL at 09:05

## 2024-05-30 RX ADMIN — TRAZODONE HYDROCHLORIDE 25 MG: 50 TABLET ORAL at 09:05

## 2024-05-30 RX ADMIN — ERYTHROPOIETIN 10000 UNITS: 10000 INJECTION, SOLUTION INTRAVENOUS; SUBCUTANEOUS at 12:05

## 2024-05-30 RX ADMIN — ATORVASTATIN CALCIUM 40 MG: 20 TABLET, FILM COATED ORAL at 09:05

## 2024-05-30 RX ADMIN — FAMOTIDINE 20 MG: 20 TABLET ORAL at 09:05

## 2024-05-30 RX ADMIN — AMIODARONE HYDROCHLORIDE 400 MG: 200 TABLET ORAL at 09:05

## 2024-05-30 RX ADMIN — AMOXICILLIN 500 MG: 500 CAPSULE ORAL at 09:05

## 2024-05-30 RX ADMIN — CHOLECALCIFEROL TAB 25 MCG (1000 UNIT) 1000 UNITS: 25 TAB at 09:05

## 2024-05-30 RX ADMIN — METOLAZONE 10 MG: 5 TABLET ORAL at 12:05

## 2024-05-30 RX ADMIN — WARFARIN SODIUM 2.5 MG: 2.5 TABLET ORAL at 05:05

## 2024-05-30 RX ADMIN — FOLIC ACID 1 MG: 1 TABLET ORAL at 09:05

## 2024-05-30 NOTE — H&P
Please see IR consult note dated 5/30/2024    Linda Avalos PA-C  Interventional Radiology  Spectra [id number]  5/30/2024

## 2024-05-30 NOTE — PLAN OF CARE
Problem: Adult Inpatient Plan of Care  Goal: Plan of Care Review  Outcome: Progressing  Goal: Patient-Specific Goal (Individualized)  Outcome: Progressing  Goal: Absence of Hospital-Acquired Illness or Injury  Outcome: Progressing  Goal: Optimal Comfort and Wellbeing  Outcome: Progressing  Goal: Readiness for Transition of Care  Outcome: Progressing     Problem: Diabetes Comorbidity  Goal: Blood Glucose Level Within Targeted Range  Outcome: Progressing     Problem: Acute Kidney Injury/Impairment  Goal: Fluid and Electrolyte Balance  Outcome: Progressing  Goal: Improved Oral Intake  Outcome: Progressing  Goal: Effective Renal Function  Outcome: Progressing     Problem: Pneumonia  Goal: Fluid Balance  Outcome: Progressing  Goal: Resolution of Infection Signs and Symptoms  Outcome: Progressing  Goal: Effective Oxygenation and Ventilation  Outcome: Progressing     Problem: Infection  Goal: Absence of Infection Signs and Symptoms  Outcome: Progressing     Problem: Ventricular Assist Device  Goal: Optimal Adjustment to Device  Outcome: Progressing  Goal: Absence of Bleeding  Outcome: Progressing  Goal: Absence of Embolism Signs and Symptoms  Outcome: Progressing  Goal: Optimal Blood Flow  Outcome: Progressing  Goal: Absence of Infection Signs and Symptoms  Outcome: Progressing  Goal: Effective Right-Sided Heart Function  Outcome: Progressing     Problem: Fall Injury Risk  Goal: Absence of Fall and Fall-Related Injury  Outcome: Progressing   Pt educated on fall risk, pt remained free from falls/trauma/injury. Denies chest pain, SOB, palpitations or dizziness. Plan of care reviewed with pt and spouse. BP and MAP elevated during beginning of shift. MD was notified, IV hydralazine given x1, BP and MAP WNL. Scheduled PO hydralazine given. Bed locked in lowest position, call bell within reach, no acute distress noted, will continue to monitor.

## 2024-05-30 NOTE — PLAN OF CARE
Problem: Adult Inpatient Plan of Care  Goal: Plan of Care Review  Outcome: Progressing  Flowsheets (Taken 5/30/2024 1202)  Plan of Care Reviewed With:   patient   spouse  Goal: Patient-Specific Goal (Individualized)  Outcome: Progressing  Goal: Absence of Hospital-Acquired Illness or Injury  Outcome: Progressing  Intervention: Identify and Manage Fall Risk  Flowsheets (Taken 5/30/2024 1202)  Safety Promotion/Fall Prevention: assistive device/personal item within reach  Intervention: Prevent Skin Injury  Flowsheets (Taken 5/30/2024 1202)  Body Position: position changed independently  Intervention: Prevent and Manage VTE (Venous Thromboembolism) Risk  Flowsheets (Taken 5/30/2024 1202)  VTE Prevention/Management: bleeding risk assessed  Intervention: Prevent Infection  Flowsheets (Taken 5/30/2024 1202)  Infection Prevention:   cohorting utilized   environmental surveillance performed   personal protective equipment utilized   rest/sleep promoted   equipment surfaces disinfected   single patient room provided   hand hygiene promoted  Goal: Optimal Comfort and Wellbeing  Outcome: Progressing  Intervention: Monitor Pain and Promote Comfort  Flowsheets (Taken 5/30/2024 1202)  Pain Management Interventions: diversional activity provided  Intervention: Provide Person-Centered Care  Flowsheets (Taken 5/30/2024 1202)  Trust Relationship/Rapport:   care explained   questions encouraged   choices provided  Goal: Readiness for Transition of Care  Outcome: Progressing     Problem: Diabetes Comorbidity  Goal: Blood Glucose Level Within Targeted Range  Outcome: Progressing     Problem: Acute Kidney Injury/Impairment  Goal: Fluid and Electrolyte Balance  Outcome: Progressing  Intervention: Monitor and Manage Fluid and Electrolyte Balance  Flowsheets (Taken 5/30/2024 1202)  Fluid/Electrolyte Management: fluids provided  Goal: Improved Oral Intake  Outcome: Progressing  Goal: Effective Renal Function  Outcome: Progressing      Problem: Pneumonia  Goal: Fluid Balance  Outcome: Progressing  Goal: Resolution of Infection Signs and Symptoms  Outcome: Progressing  Goal: Effective Oxygenation and Ventilation  Outcome: Progressing     Problem: Infection  Goal: Absence of Infection Signs and Symptoms  Outcome: Progressing     Problem: Ventricular Assist Device  Goal: Optimal Adjustment to Device  Outcome: Progressing  Goal: Absence of Bleeding  Outcome: Progressing  Goal: Absence of Embolism Signs and Symptoms  Outcome: Progressing  Goal: Optimal Blood Flow  Outcome: Progressing  Goal: Absence of Infection Signs and Symptoms  Outcome: Progressing  Goal: Effective Right-Sided Heart Function  Outcome: Progressing     Problem: Fall Injury Risk  Goal: Absence of Fall and Fall-Related Injury  Outcome: Progressing

## 2024-05-30 NOTE — ASSESSMENT & PLAN NOTE
SrCr 6.4 on admission  Baseline ~4 as of recent   Nephrology following   Trend BMPs   Diuresis as above

## 2024-05-30 NOTE — SUBJECTIVE & OBJECTIVE
Interval History: Patient seen and examined this AM. Overnight with reported hypertension via Doppler. Afebrile with pulse ranging from 80-70s bpm. He is saturating +93% on room with documented UOP of ~1.675 liters. Renal function with some improvement today. Per Cardiology service plan to diuresis a further prior to discharge.    Review of patient's allergies indicates:  No Known Allergies  Current Facility-Administered Medications   Medication Frequency    acetaminophen tablet 650 mg Q6H PRN    amiodarone tablet 400 mg Daily    ampicillin (OMNIPEN) 2 g in sodium chloride 0.9 % 100 mL IVPB (MB+) Q12H    atorvastatin tablet 40 mg QHS    bumetanide injection 2 mg BID    cefTRIAXone (ROCEPHIN) 2 g in dextrose 5 % in water (D5W) 100 mL IVPB (MB+) Q12H    famotidine tablet 20 mg Daily    fluticasone propionate 50 mcg/actuation nasal spray 100 mcg Daily    folic acid tablet 1 mg Daily    gabapentin capsule 100 mg BID    hydrALAZINE tablet 100 mg Q8H    magnesium oxide tablet 400 mg Daily    omega-3 acid ethyl esters capsule 2 g BID    senna-docusate 8.6-50 mg per tablet 2 tablet Daily PRN    trazodone split tablet 25 mg QHS    venlafaxine tablet 37.5 mg Daily    vitamin D 1000 units tablet 1,000 Units Daily    warfarin (COUMADIN) tablet 2.5 mg Daily       Objective:     Vital Signs (Most Recent):  Temp: 98.1 °F (36.7 °C) (05/30/24 0512)  Pulse: 88 (05/30/24 0745)  Resp: 17 (05/30/24 0512)  BP: 105/61 (05/30/24 0745)  SpO2: (!) 92 % (05/30/24 0512) Vital Signs (24h Range):  Temp:  [97.4 °F (36.3 °C)-98.1 °F (36.7 °C)] 98.1 °F (36.7 °C)  Pulse:  [61-88] 88  Resp:  [17-20] 17  SpO2:  [92 %-99 %] 92 %  BP: ()/(0-96) 105/61     Weight: 86.1 kg (189 lb 13.1 oz) (05/30/24 0415)  Body mass index is 24.37 kg/m².  Body surface area is 2.12 meters squared.    I/O last 3 completed shifts:  In: 1200 [P.O.:1200]  Out: 2425 [Urine:2425]     Physical Exam  Vitals and nursing note reviewed.   Constitutional:       General: He is  awake. He is not in acute distress.     Appearance: He is ill-appearing. He is not diaphoretic.   HENT:      Head: Normocephalic and atraumatic.      Right Ear: External ear normal.      Left Ear: External ear normal.      Nose: Nose normal.      Mouth/Throat:      Mouth: Mucous membranes are moist.      Pharynx: Oropharynx is clear. No oropharyngeal exudate or posterior oropharyngeal erythema.   Eyes:      General: No scleral icterus.        Right eye: No discharge.         Left eye: No discharge.      Extraocular Movements: Extraocular movements intact.      Conjunctiva/sclera: Conjunctivae normal.   Neck:      Comments: Trialysis catheter to left internal jugular vein.   Cardiovascular:      Rate and Rhythm: Normal rate.      Heart sounds: Murmur (humming sound, VAD) heard.      Systolic murmur is present.      No friction rub. No gallop.      Comments: There is bilateral pitting lower extremity edema which extends proximally.  Pulmonary:      Effort: Pulmonary effort is normal. No respiratory distress.      Breath sounds: Rales present. No wheezing or rhonchi.      Comments: Faint bibasilar crackles appreciated.  Chest:      Comments: Drive line to right lower quadrant. Tunneled catheter to right chest wall.  Abdominal:      General: Bowel sounds are normal. There is no distension.      Palpations: Abdomen is soft.   Musculoskeletal:      Cervical back: Neck supple.      Right lower le+ Pitting Edema present.      Left lower le+ Pitting Edema present.   Skin:     General: Skin is warm and dry.      Coloration: Skin is not jaundiced.   Neurological:      General: No focal deficit present.      Mental Status: He is alert. Mental status is at baseline.      Cranial Nerves: No cranial nerve deficit.      Motor: Weakness present.   Psychiatric:         Mood and Affect: Mood normal.         Behavior: Behavior normal. Behavior is cooperative.          Significant Labs:  BMP:   Recent Labs   Lab 24  3883    *      K 3.8   CL 99   CO2 29   BUN 61*   CREATININE 6.1*   CALCIUM 9.6   MG 1.8     CBC:   Recent Labs   Lab 05/30/24  0531   WBC 5.23   RBC 2.18*   HGB 6.5*   HCT 20.6*      MCV 95   MCH 29.8   MCHC 31.6*     CMP:   Recent Labs   Lab 05/29/24  0530 05/30/24  0531    114*   CALCIUM 9.2 9.6   ALBUMIN 2.6*  --    PROT 7.3  --     140   K 4.1 3.8   CO2 26 29    99   BUN 67* 61*   CREATININE 6.4* 6.1*   ALKPHOS 113  --    ALT 29  --    AST 26  --    BILITOT 0.2  --      Coagulation:   Recent Labs   Lab 05/30/24  0531   INR 2.6*   APTT 47.1*     LFTs:   Recent Labs   Lab 05/29/24  0530   ALT 29   AST 26   ALKPHOS 113   BILITOT 0.2   PROT 7.3   ALBUMIN 2.6*     Microbiology Results (last 7 days)       Procedure Component Value Units Date/Time    Blood culture [[id number]] Collected: 05/29/24 2050    Order Status: Completed Specimen: Blood from Peripheral, Antecubital, Left Updated: 05/30/24 0515     Blood Culture, Routine No Growth to date    Blood culture [[id number]] Collected: 05/29/24 2102    Order Status: Completed Specimen: Blood from Peripheral, Upper Arm, Right Updated: 05/30/24 0345     Blood Culture, Routine No Growth to date    Blood culture [[id number]]     Order Status: Canceled Specimen: Blood     Blood culture [[id number]]     Order Status: Canceled Specimen: Blood           Specimen (24h ago, onward)      None          TSH:   Recent Labs   Lab 05/23/24  0940   TSH 0.842     Recent Labs   Lab 05/29/24  1243   COLORU Colorless*   SPECGRAV 1.010   PHUR 7.0   PROTEINUA 1+*   BACTERIA None   NITRITE Negative   LEUKOCYTESUR Negative   HYALINECASTS 0      Significant Imaging:  I have reviewed all imagining in the last 24 hours.    Retroperitoneal ultrasound on 05/30/2024:  FINDINGS:  Right kidney: The right kidney measures 11.7 cm.  No cortical thinning. No loss of corticomedullary distinction.  Resistive index measures 0.77.  No mass. No renal stone. No hydronephrosis.      Left kidney: The left kidney measures 11.7 cm. No cortical thinning. No loss of corticomedullary distinction.  Resistive index measures 0.79.  No mass. No renal stone. No hydronephrosis.     The bladder is partially distended at the time of scanning and demonstrates mild wall thickening.  Prostate measures 4.8 x 3.7 x 4.7     Impression:  1. Elevated resistive indices may be seen with medical renal disease.  No hydronephrosis.  2. Enlarged prostate with mild thickening of the urinary bladder wall.

## 2024-05-30 NOTE — PROGRESS NOTES
05/29/24 2019 05/29/24 2147 05/29/24 2148   Vital Signs   BP (!) 98/0 (!) 100/0 (!) 108/0   MAP (mmHg)  --   --   --    BP Location Left arm Left arm Left arm   BP Method Doppler Doppler Doppler   Patient Position Lying Lying Lying      05/29/24 2157 05/29/24 2223 05/29/24 2224   Vital Signs   BP (!) 119/96 132/70  (pre hydralazine) (!) 142/63  (post hydralazine admin)   MAP (mmHg) 104 92 90   BP Location Left arm Left arm Left arm   BP Method Automatic Automatic Automatic   Patient Position Lying Lying Lying      05/29/24 2300 05/29/24 2337   Vital Signs   BP (!) 146/92 (!) 102/0   MAP (mmHg) 103  --    BP Location  --  Left arm   BP Method  --  Doppler   Patient Position  --  Lying     2147: MD notified of elevated BP, doppler, & MAP. Ordered to give hydralazine IV.  2300: MD notified of elevated BP, doppler, & MAP. No new orders. Will continue to monitor pt.

## 2024-05-30 NOTE — ASSESSMENT & PLAN NOTE
Procedure: Device Interrogation Including analysis of device parameters  Current Settings: Ventricular Assist Device  Review of device function is stable  LDH stable   VAD interrogation with no notable events   INR therapeutic. Continue Coumadin per pharmacy adjustment     Cr is elevated, leg edema present and weight went up by 2 lbs  Home regimen is 4 bid po bumex with prn metolazone  Continue IV bumex 2 mg bid. Metolazone scheduled 10mg daily   Monitor I/Os with daily weights             5/30/2024     5:11 AM 5/29/2024    11:43 PM 5/29/2024     8:18 PM 5/29/2024     3:45 PM 5/29/2024    12:41 PM 5/29/2024     7:32 AM 5/29/2024     5:57 AM   TXP LVAD INTERROGATIONS   Type HeartMate3 HeartMate3 HeartMate3 HeartMate3 HeartMate3 HeartMate3 HeartMate3   Flow 3.7 3.7 3.8 3.7 4 4 3.8   Speed  5000 5000 5000 5000 5000 5000   PI 6 6.1 6.4 7.6 4 4 4.9   Power (Carringotn) 3.3 3.5 3.5 3.5 3.3 3.3 3.3   LSL 4600 4600 4600 4600 4600 4600 4600   Pulsatility No Pulse No Pulse Intermittent pulse Intermittent pulse Intermittent pulse Intermittent pulse Intermittent pulse

## 2024-05-30 NOTE — SUBJECTIVE & OBJECTIVE
Interval History: No acute events overnight. Crt slightly improved. H/H still low. Starting Epo shots today. Net negative 800cc in last 24hrs on IVP Bumex 2mg BID. Will schedule Metolazone today. Nephrology will see as outpatient follow up. Iron studies pending.     Scheduled Meds:   amiodarone  400 mg Oral Daily    ampicillin (OMNIPEN) 2 g in sodium chloride 0.9 % 100 mL IVPB (MB+)  2 g Intravenous Q12H    atorvastatin  40 mg Oral QHS    bumetanide  2 mg Intravenous BID    cefTRIAXone (ROCEPHIN) 2 g in dextrose 5 % in water (D5W) 100 mL IVPB (MB+)  2 g Intravenous Q12H    epoetin zohaib  10,000 Units Intravenous Once    famotidine  20 mg Oral Daily    fluticasone propionate  2 spray Each Nostril Daily    folic acid  1 mg Oral Daily    gabapentin  100 mg Oral BID    hydrALAZINE  100 mg Oral Q8H    magnesium oxide  400 mg Oral Daily    metOLazone  10 mg Oral Daily    omega-3 acid ethyl esters  2 g Oral BID    traZODone  25 mg Oral QHS    venlafaxine  37.5 mg Oral Daily    vitamin D  1,000 Units Oral Daily    warfarin  2.5 mg Oral Daily     PRN Meds:  Current Facility-Administered Medications:     acetaminophen, 650 mg, Oral, Q6H PRN    senna-docusate 8.6-50 mg, 2 tablet, Oral, Daily PRN    Review of patient's allergies indicates:  No Known Allergies  Objective:     Vital Signs (Most Recent):  Temp: 98.7 °F (37.1 °C) (05/30/24 1159)  Pulse: 86 (05/30/24 1159)  Resp: 16 (05/30/24 1159)  BP: 105/61 (05/30/24 0745)  SpO2: (!) 92 % (05/30/24 1159) Vital Signs (24h Range):  Temp:  [97.4 °F (36.3 °C)-98.7 °F (37.1 °C)] 98.7 °F (37.1 °C)  Pulse:  [53-90] 86  Resp:  [16-20] 16  SpO2:  [92 %-99 %] 92 %  BP: ()/(0-96) 105/61     Patient Vitals for the past 72 hrs (Last 3 readings):   Weight   05/30/24 1215 83.7 kg (184 lb 8.4 oz)   05/30/24 0828 83.7 kg (184 lb 8.4 oz)   05/30/24 0415 86.1 kg (189 lb 13.1 oz)     Body mass index is 23.69 kg/m².      Intake/Output Summary (Last 24 hours) at 5/30/2024 1225  Last data filed at  5/30/2024 1012  Gross per 24 hour   Intake 660 ml   Output 1300 ml   Net -640 ml       Hemodynamic Parameters:       Telemetry: NSR        Physical Exam  Constitutional:       Appearance: Normal appearance.   HENT:      Head: Normocephalic and atraumatic.   Neck:      Comments: JVD present   Cardiovascular:      Rate and Rhythm: Normal rate and regular rhythm.      Comments: Smooth VAD hum   Pulmonary:      Effort: Pulmonary effort is normal.      Breath sounds: Normal breath sounds.   Abdominal:      General: Bowel sounds are normal.      Palpations: Abdomen is soft.   Musculoskeletal:      Cervical back: Normal range of motion and neck supple.      Right lower leg: Edema present.      Left lower leg: Edema present.      Comments: R foot weakness/some numbness    Neurological:      General: No focal deficit present.      Mental Status: He is alert and oriented to person, place, and time.      Motor: Weakness present.      Comments: R foot weakness             Significant Labs:  CBC:  Recent Labs   Lab 05/28/24  1244 05/29/24  0530 05/30/24  0531   WBC 5.44 4.99 5.23   RBC 2.39* 2.11* 2.18*   HGB 6.9* 6.4* 6.5*   HCT 22.8* 20.0* 20.6*    247 244   MCV 95 95 95   MCH 28.9 30.3 29.8   MCHC 30.3* 32.0 31.6*     BNP:  Recent Labs   Lab 05/29/24  0530   BNP 1,429*     CMP:  Recent Labs   Lab 05/27/24  1305 05/27/24  1636 05/28/24  0559 05/28/24  1244 05/29/24  0530 05/30/24  0531   * 131*   < > 210* 106 114*   CALCIUM 8.8 9.1   < > 9.3 9.2 9.6   ALBUMIN 2.8* 2.8*  --   --  2.6*  --    PROT 7.3 8.1  --   --  7.3  --     142   < > 140 141 140   K 3.0* 3.1*   < > 4.2 4.1 3.8   CO2 28 29   < > 26 26 29   CL 95 97   < > 99 101 99   BUN 68* 66*   < > 63* 67* 61*   CREATININE 6.5* 6.6*   < > 6.5* 6.4* 6.1*   ALKPHOS 107 112  --   --  113  --    ALT 33 30  --   --  29  --    AST 28 27  --   --  26  --    BILITOT 0.2 0.2  --   --  0.2  --     < > = values in this interval not displayed.      Coagulation:    Recent Labs   Lab 05/28/24  0559 05/29/24  0530 05/30/24  0531   INR 3.4* 2.5* 2.6*   APTT 49.3* 45.4* 47.1*     LDH:  Recent Labs   Lab 05/28/24  0559 05/29/24  0530 05/30/24  0531   * 356* 367*     Microbiology:  Microbiology Results (last 7 days)       Procedure Component Value Units Date/Time    Blood culture [[id number]] Collected: 05/29/24 2050    Order Status: Completed Specimen: Blood from Peripheral, Antecubital, Left Updated: 05/30/24 0515     Blood Culture, Routine No Growth to date    Blood culture [[id number]] Collected: 05/29/24 2102    Order Status: Completed Specimen: Blood from Peripheral, Upper Arm, Right Updated: 05/30/24 0345     Blood Culture, Routine No Growth to date    Blood culture [[id number]]     Order Status: Canceled Specimen: Blood     Blood culture [[id number]]     Order Status: Canceled Specimen: Blood             I have reviewed all pertinent labs within the past 24 hours.    Estimated Creatinine Clearance: 14.6 mL/min (A) (based on SCr of 6.1 mg/dL (H)).    Diagnostic Results:  I have reviewed all pertinent imaging results/findings within the past 24 hours.

## 2024-05-30 NOTE — PLAN OF CARE
Pt arrived to  for tunneled line removal. Pt oriented to unit and staff. Plan of care reviewed with patient, patient verbalizes understanding. Comfort measures utilized. Pt safely transferred from stretcher to procedural table. Fall risk reviewed with patient, fall risk interventions maintained. Safety strap applied, positioner pillows utilized to minimize pressure points. Blankets applied. Pt prepped and draped utilizing standard sterile technique. Patient placed on continuous monitoring, as required by sedation policy. Timeouts completed utilizing standard universal time-out, per department and facility policy. RN to remain at bedside, continuous monitoring maintained. Pt resting comfortably. Denies pain/discomfort. Will continue to monitor. See flow sheets for monitoring, medication administration, and updates.

## 2024-05-30 NOTE — CONSULTS
Tunneled Catheter Removal Consult Note  Interventional Radiology      Date: 5/30/2024   Primary team: [facility] HEART TRANSPLANT TEAM 1, Sajan Hurley, *   Room/bed: 317/317 A    Inpatient consult to Interventional Radiology  Consult performed by: Linda Avalos PA-C  Consult ordered by: Hugh Duran NP             SUBJECTIVE:     Chief Complaint:  completed IV abx course, removal of tunneled line    History of Present Illness:  Radha Abbott is a 64 y.o. male with a past medical history of ICM, CAD (s/p 3 vessel CABG 2009), DM II, LVAD and CKD3 who was admitted on 5/28/24 for R foot weakness. He had a tunneled line placement for IV abx on 4/30/24. He completed a 6 weeks course per ID today. Has now been transitioned to po abx.      Interventional Radiology has been consulted for tunneled catheter removal.       Review of Systems   Constitutional: Negative.    HENT: Negative.     Respiratory: Negative.     Cardiovascular: Negative.    Gastrointestinal: Negative.    Musculoskeletal: Negative.    Skin: Negative.    Neurological: Negative.    Psychiatric/Behavioral: Negative.          Scheduled Meds:   amiodarone  400 mg Oral Daily    amoxicillin  500 mg Oral Q12H    atorvastatin  40 mg Oral QHS    bumetanide  2 mg Intravenous BID    famotidine  20 mg Oral Daily    fluticasone propionate  2 spray Each Nostril Daily    folic acid  1 mg Oral Daily    gabapentin  100 mg Oral BID    hydrALAZINE  100 mg Oral Q8H    magnesium oxide  400 mg Oral Daily    metOLazone  10 mg Oral Daily    omega-3 acid ethyl esters  2 g Oral BID    traZODone  25 mg Oral QHS    venlafaxine  37.5 mg Oral Daily    vitamin D  1,000 Units Oral Daily    warfarin  2.5 mg Oral Daily     Continuous Infusions:  PRN Meds:  Current Facility-Administered Medications:     acetaminophen, 650 mg, Oral, Q6H PRN    senna-docusate 8.6-50 mg, 2 tablet, Oral, Daily PRN    Review of patient's allergies indicates:  No Known Allergies    Past Medical History:    Diagnosis Date    CAD (coronary artery disease)     CHF (congestive heart failure)     Diabetes mellitus     HFrEF (heart failure with reduced ejection fraction)     ICD (implantable cardioverter-defibrillator) in place     MI, old      Past Surgical History:   Procedure Laterality Date    ANGIOPLASTY-VENOUS ARTERY Right 12/1/2023    Procedure: ANGIOPLASTY-VENOUS ARTERY, RIGHT FEMORAL;  Surgeon: Yuri Washington MD;  Location: [facility] OR [address] [place] [address];  Service: Cardiovascular;  Laterality: Right;    AORTIC VALVULOPLASTY N/A 12/1/2023    Procedure: REPAIR, AORTIC VALVE;  Surgeon: Yuri Washington MD;  [facility];  Service: Cardiovascular;  Laterality: N/A;    CARDIAC SURGERY      CLOSURE N/A 12/1/2023    Procedure: CLOSURE, TEMPORARY;  Surgeon: Yuri Washington MD;  [facility];  Service: Cardiovascular;  Laterality: N/A;    DRAINAGE OF PLEURAL EFFUSION  12/4/2023    Procedure: DRAINAGE, PLEURAL EFFUSION;  Surgeon: Yuri Washington MD;  [facility];  Service: Cardiovascular;;    INSERTION OF GRAFT TO PERICARDIUM  12/4/2023    Procedure: INSERTION, GRAFT, PERICARDIUM;  Surgeon: Yuri Washington MD;  [facility];  Service: Cardiovascular;;    INSERTION OF INTRA-AORTIC BALLOON ASSIST DEVICE Right 11/21/2023    Procedure: INSERTION, INTRA-AORTIC BALLOON PUMP;  Surgeon: Finn Cohn MD;  Location: [facility] CATH LAB;  Service: Cardiology;  Laterality: Right;    LEFT VENTRICULAR ASSIST DEVICE Left 12/1/2023    Procedure: INSERTION-LEFT VENTRICULAR ASSIST DEVICE;  Surgeon: Yuri Washington MD;  Location: [facility] OR [address] [place] [address];  Service: Cardiovascular;  Laterality: Left;  REDO STERNOTOMY - REDO SAW NEEDED FOR CASE    LYSIS OF ADHESIONS  12/1/2023    Procedure: LYSIS, ADHESIONS;  Surgeon: Yuri Washington MD;  Location: [facility] OR [address] [place] [address];  Service: Cardiovascular;;    PLACEMENT OF SWAN ROLANDO CATHETER WITH IMAGING GUIDANCE  11/20/2023    Procedure: INSERTION, CATHETER, SWAN-ROLANDO, WITH  IMAGING GUIDANCE;  Surgeon: Sajan Hurley MD;  Location: [facility] CATH LAB;  Service: Cardiology;;    REMOVAL OF TUNNELED CENTRAL VENOUS CATHETER (CVC) N/A 3/1/2024    Procedure: REMOVAL, CATHETER, CENTRAL VENOUS, TUNNELED;  Surgeon: Seble Aguilar MD;  Location: [facility] CATH LAB;  Service: Interventional Nephrology;  Laterality: N/A;    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 12/1/2023    Procedure: REPAIR, ANEURYSM, ARTERY, FEMORAL;  Surgeon: Yuri Washington MD;  [facility];  Service: Cardiovascular;  Laterality: Right;  Right Femoral Artery Repair    RIGHT HEART CATHETERIZATION Right 10/10/2023    Procedure: INSERTION, CATHETER, RIGHT HEART;  Surgeon: Bin Gandhi MD;  Location: [facility] CATH LAB;  Service: Cardiology;  Laterality: Right;    RIGHT HEART CATHETERIZATION Right 10/13/2023    Procedure: INSERTION, CATHETER, RIGHT HEART;  Surgeon: Walter Mcintyre MD;  Location: [facility] CATH LAB;  Service: Cardiology;  Laterality: Right;    RIGHT HEART CATHETERIZATION  11/13/2023    RIGHT HEART CATHETERIZATION Right 11/13/2023    Procedure: INSERTION, CATHETER, RIGHT HEART;  Surgeon: Juventino Bermudez Jr., MD;  Location: [facility] CATH LAB;  Service: Cardiology;  Laterality: Right;    RIGHT HEART CATHETERIZATION Right 11/20/2023    Procedure: INSERTION, CATHETER, RIGHT HEART;  Surgeon: Sajan Hurley MD;  Location: [facility] CATH LAB;  Service: Cardiology;  Laterality: Right;    RIGHT HEART CATHETERIZATION Right 1/22/2024    Procedure: INSERTION, CATHETER, RIGHT HEART;  Surgeon: Brayan Ocampo MD;  Location: [facility] CATH LAB;  Service: Cardiology;  Laterality: Right;    STERNAL WOUND CLOSURE N/A 12/4/2023    Procedure: CLOSURE, WOUND, STERNUM;  Surgeon: Yuri Washington MD;  [facility];  Service: Cardiovascular;  Laterality: N/A;    STERNOTOMY N/A 12/1/2023    Procedure: STERNOTOMY, REDO;  Surgeon: Yuri Washington MD;  [facility];  Service: Cardiovascular;  Laterality:  N/A;    VALVULOPLASTY, MITRAL VALVE N/A 12/1/2023    Procedure: VALVULOPLASTY, MITRAL VALVE;  Surgeon: Yuri Washington MD;  Location: [facility] OR [address] [place] [address];  Service: Cardiovascular;  Laterality: N/A;     No family history on file.  Social History     Tobacco Use    Smoking status: Former     Current packs/day: 0.50     Types: Cigarettes   Substance Use Topics    Alcohol use: Yes     Comment: rarely    Drug use: No       OBJECTIVE:     Vital Signs (Most Recent)  Temp: 98.7 °F (37.1 °C) (05/30/24 1159)  Pulse: 85 (05/30/24 1245)  Resp: 16 (05/30/24 1159)  BP: (!) 80/0 (05/30/24 1330)  SpO2: (!) 92 % (05/30/24 1159)    Physical Exam:   Physical Exam  Constitutional:       General: He is not in acute distress.  HENT:      Head: Normocephalic.   Cardiovascular:      Comments: LVAD in place  Pulmonary:      Effort: Pulmonary effort is normal.   Abdominal:      General: Abdomen is flat.   Skin:     Comments: R chest: tunneled line in place   Neurological:      Mental Status: He is alert and oriented to person, place, and time. Mental status is at baseline.   Psychiatric:         Mood and Affect: Mood normal.         Behavior: Behavior normal.         Laboratory  Lab Results   Component Value Date    INR 2.6 (H) 05/30/2024       Lab Results   Component Value Date    WBC 5.23 05/30/2024    HGB 6.5 (L) 05/30/2024    HCT 20.6 (L) 05/30/2024    MCV 95 05/30/2024     05/30/2024      Lab Results   Component Value Date     (H) 05/30/2024     05/30/2024    K 3.8 05/30/2024    CL 99 05/30/2024    CO2 29 05/30/2024    BUN 61 (H) 05/30/2024    CREATININE 6.1 (H) 05/30/2024    CALCIUM 9.6 05/30/2024    MG 1.8 05/30/2024    ALT 29 05/29/2024    AST 26 05/29/2024    ALBUMIN 2.6 (L) 05/29/2024    BILITOT 0.2 05/29/2024    BILIDIR 0.1 05/29/2024       ASA/Mallampati  ASA: 3  Mallampati: 2    ASSESSMENT/PLAN:     Assessment:  Radha Abbott is a 64 y.o. male with a past medical history of ICM, CAD (s/p 3 vessel CABG 2009), DM  II, LVAD and CKD3 who was admitted on 5/28/24 for R foot weakness. He had a tunneled line placement for IV abx on 4/30/24. He completed a 6 weeks course per ID today. Has now been transitioned to po abx. Interventional Radiology has been consulted for tunneled catheter removal.     Plan:  Will proceed with tunneled catheter removal on 5/30/2024 or 5/31/24 depending on IR schedule availability   Sedation plan - local only  No indication to keep patient NPO given use of local anesthesia (lidocaine).   Anticoagulation history reviewed.   Coagulation labs reviewed.  Thank you for the consult. IR Please contact with questions via [employer] secure chat.    Linda Avalos PA-C  Interventional Radiology  Spectra [phone]  5/30/2024

## 2024-05-30 NOTE — PROCEDURES
VIR procedure note      Pre Op Diagnosis:  Tunneled line no longer needed    Post Op Diagnosis: Same    Procedure: Tunneled line removal    Procedure performed by:  Bashir Cervantes MD      Written Informed Consent Obtained: Yes  Specimen Removed: NO  Estimated Blood Loss: Minimal    Findings:   Successful removal of tunneled line     Patient tolerated procedure well.         Bashir Cervantes MD  VIR Fellow

## 2024-05-30 NOTE — PROGRESS NOTES
05/30/2024  [place]    Current provider:  Sajan Hurley, *    Device interrogation:      5/30/2024     5:11 AM 5/29/2024    11:43 PM 5/29/2024     8:18 PM 5/29/2024     3:45 PM 5/29/2024    12:41 PM 5/29/2024     7:32 AM 5/29/2024     5:57 AM   TXP LVAD INTERROGATIONS   Type HeartMate3 HeartMate3 HeartMate3 HeartMate3 HeartMate3 HeartMate3 HeartMate3   Flow 3.7 3.7 3.8 3.7 4 4 3.8   Speed  5000 5000 5000 5000 5000 5000   PI 6 6.1 6.4 7.6 4 4 4.9   Power (Carrington) 3.3 3.5 3.5 3.5 3.3 3.3 3.3   LSL 4600 4600 4600 4600 4600 4600 4600   Pulsatility No Pulse No Pulse Intermittent pulse Intermittent pulse Intermittent pulse Intermittent pulse Intermittent pulse          Rounded on [facility] to ensure all mechanical assist device settings (IABP or VAD) were appropriate and all parameters were within limits.  I was able to ensure all back up equipment was present, the staff had no issues, and the Perfusion Department daily rounding was complete.      For implantable VADs: Interrogation of Ventricular assist device was performed with analysis of device parameters and review of device function. I have personally reviewed the interrogation findings and agree with findings as stated.     In emergency, the nursing units have been notified to contact the perfusion department either by:  Calling [phone] from 630am to 4pm Mon thru Fri, utilizing the On-Call Finder functionality of Epic and searching for Perfusion, or by contacting the hospital  from 4pm to 630am and on weekends and asking to speak with the perfusionist on call.    1:48 PM

## 2024-05-30 NOTE — ASSESSMENT & PLAN NOTE
On 6 week course of ampicillin and Rocephin till 5/30/24  WBC normal   Should complete today. Will verify with ID.   Tunneled line should come out if so before discharge

## 2024-05-30 NOTE — NURSING
Nurses Note -- 4 Eyes      5/30/2024   3:18 AM      Skin assessed during: Q Shift Change      [x] No Altered Skin Integrity Present    []Prevention Measures Documented      [] Yes- Altered Skin Integrity Present or Discovered   [] LDA Added if Not in Epic (Describe Wound)   [] New Altered Skin Integrity was Present on Admit and Documented in LDA   [] Wound Image Taken    Wound Care Consulted? No    Attending Nurse:  Arie Avalos RN/Staff Member:

## 2024-05-30 NOTE — ASSESSMENT & PLAN NOTE
-possibly secondary to dilutional anemia in the setting of hypervolemia.   -Also in the setting of renal failure  -H&H 6.5/20.6  - No  overt signs of bleeding   -Starting Epo today  -Iron studies pending   -Trend CBCs  -Will follow up with nephrology as OP

## 2024-05-30 NOTE — ASSESSMENT & PLAN NOTE
- patient with progressing advanced CKD V (baseline creatinine ~5 at best) with recurrent AKIs and previous dialysis dependence (tunneled HD catheter removed in early March of this year)   - his renally function likely reflects progression of his CKD in setting of CRS type 2, his urinary sediment was largely bland   - will follow-up retroperitoneal US  - can continue Bumex 2 mg IV BID and thiazide diuretic if warranted by primary team  - favor serial RFPs and magnesium levels in light of active diuresis   - please avoid hypotension/major fluctuations in BP (MAP > 65 mmHg)  - renal diet when not NPO  - strict I/O's and daily weights  - renally all dose medications to eGFR   - avoid nephrotoxic agents wean feasible (i.e. NSAIDs, intra-arterial contrast, supra-therapeutic vancomycin levels, etc.)  - currently there are no acute indications for RRT at this time however will continue to monitor closely  - on discharge will need close follow-up with Dr. Mendez his nephrologist here at [facility] for HD planning

## 2024-05-30 NOTE — PLAN OF CARE
Tunneled line removal complete. Pt tolerated well. VSS. No signs or symptoms of distress noted.  Right chest site CDI.  Pt will be transferred to [facility]; report called to KYAW Bryant

## 2024-05-30 NOTE — PROGRESS NOTES
05/30/24 0512   Vital Signs   BP (!) 100/0   BP Location Left arm   BP Method Doppler   Patient Position Lying     BP elevated, scheduled PO hydralazine given.

## 2024-05-30 NOTE — PROGRESS NOTES
[facility] - Cardiology Stepdown  Heart Transplant  Progress Note    Patient Name: Radha Abbott  MRN: [id number]  Admission Date: 5/28/2024  Hospital Length of Stay: 2 days  Attending Physician: Sajan Hurley, *  Primary Care Provider: Vasu Kong MD  Principal Problem:Weakness of foot, right    Subjective:   Interval History: No acute events overnight. Crt slightly improved. H/H still low. Starting Epo shots today. Net negative 800cc in last 24hrs on IVP Bumex 2mg BID. Will schedule Metolazone today. Nephrology will see as outpatient follow up. Iron studies pending.     Scheduled Meds:   amiodarone  400 mg Oral Daily    ampicillin (OMNIPEN) 2 g in sodium chloride 0.9 % 100 mL IVPB (MB+)  2 g Intravenous Q12H    atorvastatin  40 mg Oral QHS    bumetanide  2 mg Intravenous BID    cefTRIAXone (ROCEPHIN) 2 g in dextrose 5 % in water (D5W) 100 mL IVPB (MB+)  2 g Intravenous Q12H    epoetin zohaib  10,000 Units Intravenous Once    famotidine  20 mg Oral Daily    fluticasone propionate  2 spray Each Nostril Daily    folic acid  1 mg Oral Daily    gabapentin  100 mg Oral BID    hydrALAZINE  100 mg Oral Q8H    magnesium oxide  400 mg Oral Daily    metOLazone  10 mg Oral Daily    omega-3 acid ethyl esters  2 g Oral BID    traZODone  25 mg Oral QHS    venlafaxine  37.5 mg Oral Daily    vitamin D  1,000 Units Oral Daily    warfarin  2.5 mg Oral Daily     PRN Meds:  Current Facility-Administered Medications:     acetaminophen, 650 mg, Oral, Q6H PRN    senna-docusate 8.6-50 mg, 2 tablet, Oral, Daily PRN    Review of patient's allergies indicates:  No Known Allergies  Objective:     Vital Signs (Most Recent):  Temp: 98.7 °F (37.1 °C) (05/30/24 1159)  Pulse: 86 (05/30/24 1159)  Resp: 16 (05/30/24 1159)  BP: 105/61 (05/30/24 0745)  SpO2: (!) 92 % (05/30/24 1159) Vital Signs (24h Range):  Temp:  [97.4 °F (36.3 °C)-98.7 °F (37.1 °C)] 98.7 °F (37.1 °C)  Pulse:  [53-90] 86  Resp:  [16-20] 16  SpO2:  [92 %-99 %] 92 %  BP:  ()/(0-96) 105/61     Patient Vitals for the past 72 hrs (Last 3 readings):   Weight   05/30/24 1215 83.7 kg (184 lb 8.4 oz)   05/30/24 0828 83.7 kg (184 lb 8.4 oz)   05/30/24 0415 86.1 kg (189 lb 13.1 oz)     Body mass index is 23.69 kg/m².      Intake/Output Summary (Last 24 hours) at 5/30/2024 1225  Last data filed at 5/30/2024 1012  Gross per 24 hour   Intake 660 ml   Output 1300 ml   Net -640 ml       Hemodynamic Parameters:       Telemetry: NSR        Physical Exam  Constitutional:       Appearance: Normal appearance.   HENT:      Head: Normocephalic and atraumatic.   Neck:      Comments: JVD present   Cardiovascular:      Rate and Rhythm: Normal rate and regular rhythm.      Comments: Smooth VAD hum   Pulmonary:      Effort: Pulmonary effort is normal.      Breath sounds: Normal breath sounds.   Abdominal:      General: Bowel sounds are normal.      Palpations: Abdomen is soft.   Musculoskeletal:      Cervical back: Normal range of motion and neck supple.      Right lower leg: Edema present.      Left lower leg: Edema present.      Comments: R foot weakness/some numbness    Neurological:      General: No focal deficit present.      Mental Status: He is alert and oriented to person, place, and time.      Motor: Weakness present.      Comments: R foot weakness             Significant Labs:  CBC:  Recent Labs   Lab 05/28/24  1244 05/29/24  0530 05/30/24  0531   WBC 5.44 4.99 5.23   RBC 2.39* 2.11* 2.18*   HGB 6.9* 6.4* 6.5*   HCT 22.8* 20.0* 20.6*    247 244   MCV 95 95 95   MCH 28.9 30.3 29.8   MCHC 30.3* 32.0 31.6*     BNP:  Recent Labs   Lab 05/29/24  0530   BNP 1,429*     CMP:  Recent Labs   Lab 05/27/24  1305 05/27/24  1636 05/28/24  0559 05/28/24  1244 05/29/24  0530 05/30/24  0531   * 131*   < > 210* 106 114*   CALCIUM 8.8 9.1   < > 9.3 9.2 9.6   ALBUMIN 2.8* 2.8*  --   --  2.6*  --    PROT 7.3 8.1  --   --  7.3  --     142   < > 140 141 140   K 3.0* 3.1*   < > 4.2 4.1 3.8   CO2  28 29   < > 26 26 29   CL 95 97   < > 99 101 99   BUN 68* 66*   < > 63* 67* 61*   CREATININE 6.5* 6.6*   < > 6.5* 6.4* 6.1*   ALKPHOS 107 112  --   --  113  --    ALT 33 30  --   --  29  --    AST 28 27  --   --  26  --    BILITOT 0.2 0.2  --   --  0.2  --     < > = values in this interval not displayed.      Coagulation:   Recent Labs   Lab 05/28/24  0559 05/29/24  0530 05/30/24  0531   INR 3.4* 2.5* 2.6*   APTT 49.3* 45.4* 47.1*     LDH:  Recent Labs   Lab 05/28/24 0559 05/29/24 0530 05/30/24  0531   * 356* 367*     Microbiology:  Microbiology Results (last 7 days)       Procedure Component Value Units Date/Time    Blood culture [[id number]] Collected: 05/29/24 2050    Order Status: Completed Specimen: Blood from Peripheral, Antecubital, Left Updated: 05/30/24 0515     Blood Culture, Routine No Growth to date    Blood culture [[id number]] Collected: 05/29/24 2102    Order Status: Completed Specimen: Blood from Peripheral, Upper Arm, Right Updated: 05/30/24 0345     Blood Culture, Routine No Growth to date    Blood culture [[id number]]     Order Status: Canceled Specimen: Blood     Blood culture [[id number]]     Order Status: Canceled Specimen: Blood             I have reviewed all pertinent labs within the past 24 hours.    Estimated Creatinine Clearance: 14.6 mL/min (A) (based on SCr of 6.1 mg/dL (H)).    Diagnostic Results:  I have reviewed all pertinent imaging results/findings within the past 24 hours.  Assessment and Plan:     Mr. Radha Abbott is a very pleasant 65 yo male with stage D HFrEF, ICMP with previous admission for ADHF/CS. He did undergo HM3 placement placed by Dr Washington on 12/1/23. Lengthy post op course complicated by vasoplegia (requiring Giaprezza), debility, malnutrition/impaired swallowing, and renal failure requiring HD(since weaned off). Once medically stable, he was transferred to Rehab for further PT/OT/ST. Of note, pt left with permacath still in place. He has done really well in  rehab and now has continued to progress well at home. He is on a very high dose of Bumex 4 mg twice daily. VAD speed is at 5000 rpm. No VAD alarms noted on interrogation occasional PI events . He is currenlty on 6 week therapy for Enterococcus bacteremia with ampicillin and rocephin ending 5/30/24. He presented to the local ED with two falls. He says he hasn't been able to move his R foot well for two days and also can't feel well in it. He has been dragging the R foot when walking which led to mechanical falls and then he went to the ED. He also says weight went up by 2 pounds from 181 to 181 lbs and he got leg edema. No CP, SOB or LH reported. No other symptoms. CT head in OSH is unremarkable so is xray foot. He was sent for neuro work up. His Cr is elevated as well.     * Weakness of foot, right  R foot motor and sensory weakness x 2 days  2 mechanical falls  CT head and xray foot unremarkable  Vascular neurology consulted-they suspect R common peroneal neuropathy. Unable to obtain MRI due to VAD   Neuropathy labs pending   PT/OT consult     LVAD (left ventricular assist device) present  Procedure: Device Interrogation Including analysis of device parameters  Current Settings: Ventricular Assist Device  Review of device function is stable  LDH stable   VAD interrogation with no notable events   INR therapeutic. Continue Coumadin per pharmacy adjustment     Cr is elevated, leg edema present and weight went up by 2 lbs  Home regimen is 4 bid po bumex with prn metolazone  Continue IV bumex 2 mg bid. Metolazone scheduled 10mg daily   Monitor I/Os with daily weights             5/30/2024     5:11 AM 5/29/2024    11:43 PM 5/29/2024     8:18 PM 5/29/2024     3:45 PM 5/29/2024    12:41 PM 5/29/2024     7:32 AM 5/29/2024     5:57 AM   TXP LVAD INTERROGATIONS   Type HeartMate3 HeartMate3 HeartMate3 HeartMate3 HeartMate3 HeartMate3 HeartMate3   Flow 3.7 3.7 3.8 3.7 4 4 3.8   Speed  5000 5000 5000 5000 5000 5000   PI 6 6.1  6.4 7.6 4 4 4.9   Power (Carrington) 3.3 3.5 3.5 3.5 3.3 3.3 3.3   LSL 4600 4600 4600 4600 4600 4600 4600   Pulsatility No Pulse No Pulse Intermittent pulse Intermittent pulse Intermittent pulse Intermittent pulse Intermittent pulse         Acute renal failure superimposed on stage 4 chronic kidney disease  SrCr 6.4 on admission  Baseline ~4 as of recent   Nephrology following   Trend BMPs   Diuresis as above     Anticoagulant long-term use  Had epistaxis 2 days ago   INR at 2.5  Hold coumadin for now    Anemia  -possibly secondary to dilutional anemia in the setting of hypervolemia.   -Also in the setting of renal failure  -H&H 6.5/20.6  - No  overt signs of bleeding   -Starting Epo today  -Iron studies pending   -Trend CBCs  -Will follow up with nephrology as OP    Bacteremia  On 6 week course of ampicillin and Rocephin till 5/30/24  WBC normal   Should complete today. Will verify with ID.   Tunneled line should come out if so before discharge     Acute on chronic combined systolic and diastolic heart failure  - plan as above     Ventricular tachycardia  Cont home radha Duran, ALETHA  Heart Transplant  [facility] - Cardiology Stepdown

## 2024-05-31 VITALS
WEIGHT: 179 LBS | HEIGHT: 74 IN | OXYGEN SATURATION: 96 % | BODY MASS INDEX: 22.97 KG/M2 | HEART RATE: 82 BPM | RESPIRATION RATE: 20 BRPM | SYSTOLIC BLOOD PRESSURE: 82 MMHG | TEMPERATURE: 98 F

## 2024-05-31 PROBLEM — B95.2 BACTEREMIA DUE TO ENTEROCOCCUS: Status: ACTIVE | Noted: 2024-05-02

## 2024-05-31 PROBLEM — R78.81 BACTEREMIA DUE TO ENTEROCOCCUS: Status: ACTIVE | Noted: 2024-05-02

## 2024-05-31 LAB
ALBUMIN SERPL BCP-MCNC: 2.5 G/DL (ref 3.5–5.2)
ALP SERPL-CCNC: 108 U/L (ref 55–135)
ALT SERPL W/O P-5'-P-CCNC: 36 U/L (ref 10–44)
ANION GAP SERPL CALC-SCNC: 15 MMOL/L (ref 8–16)
APTT PPP: 50 SEC (ref 21–32)
AST SERPL-CCNC: 35 U/L (ref 10–40)
BASOPHILS # BLD AUTO: 0.04 K/UL (ref 0–0.2)
BASOPHILS NFR BLD: 0.8 % (ref 0–1.9)
BILIRUB DIRECT SERPL-MCNC: 0.2 MG/DL (ref 0.1–0.3)
BILIRUB SERPL-MCNC: 0.2 MG/DL (ref 0.1–1)
BNP SERPL-MCNC: 1131 PG/ML (ref 0–99)
BUN SERPL-MCNC: 59 MG/DL (ref 8–23)
CALCIUM SERPL-MCNC: 9.3 MG/DL (ref 8.7–10.5)
CHLORIDE SERPL-SCNC: 98 MMOL/L (ref 95–110)
CO2 SERPL-SCNC: 25 MMOL/L (ref 23–29)
CREAT SERPL-MCNC: 6.2 MG/DL (ref 0.5–1.4)
CRP SERPL-MCNC: 90.3 MG/L (ref 0–8.2)
DIFFERENTIAL METHOD BLD: ABNORMAL
EOSINOPHIL # BLD AUTO: 0.4 K/UL (ref 0–0.5)
EOSINOPHIL NFR BLD: 8 % (ref 0–8)
ERYTHROCYTE [DISTWIDTH] IN BLOOD BY AUTOMATED COUNT: 18.9 % (ref 11.5–14.5)
EST. GFR  (NO RACE VARIABLE): 9.5 ML/MIN/1.73 M^2
FERRITIN SERPL-MCNC: 671 NG/ML (ref 20–300)
GLUCOSE SERPL-MCNC: 73 MG/DL (ref 70–110)
HCT VFR BLD AUTO: 21.8 % (ref 40–54)
HGB BLD-MCNC: 6.5 G/DL (ref 14–18)
IMM GRANULOCYTES # BLD AUTO: 0.02 K/UL (ref 0–0.04)
IMM GRANULOCYTES NFR BLD AUTO: 0.4 % (ref 0–0.5)
INR PPP: 2.8 (ref 0.8–1.2)
IRON SERPL-MCNC: 24 UG/DL (ref 45–160)
LDH SERPL L TO P-CCNC: 384 U/L (ref 110–260)
LYMPHOCYTES # BLD AUTO: 0.7 K/UL (ref 1–4.8)
LYMPHOCYTES NFR BLD: 12.7 % (ref 18–48)
MAGNESIUM SERPL-MCNC: 1.8 MG/DL (ref 1.6–2.6)
MCH RBC QN AUTO: 29.3 PG (ref 27–31)
MCHC RBC AUTO-ENTMCNC: 29.8 G/DL (ref 32–36)
MCV RBC AUTO: 98 FL (ref 82–98)
MONOCYTES # BLD AUTO: 0.5 K/UL (ref 0.3–1)
MONOCYTES NFR BLD: 9.2 % (ref 4–15)
NEUTROPHILS # BLD AUTO: 3.5 K/UL (ref 1.8–7.7)
NEUTROPHILS NFR BLD: 68.9 % (ref 38–73)
NRBC BLD-RTO: 0 /100 WBC
PHOSPHATE SERPL-MCNC: 4.7 MG/DL (ref 2.7–4.5)
PLATELET # BLD AUTO: 263 K/UL (ref 150–450)
PMV BLD AUTO: 10.7 FL (ref 9.2–12.9)
POTASSIUM SERPL-SCNC: 3.8 MMOL/L (ref 3.5–5.1)
PREALB SERPL-MCNC: 23 MG/DL (ref 20–43)
PROT SERPL-MCNC: 7.2 G/DL (ref 6–8.4)
PROTHROMBIN TIME: 28.3 SEC (ref 9–12.5)
RBC # BLD AUTO: 2.22 M/UL (ref 4.6–6.2)
SATURATED IRON: 9 % (ref 20–50)
SODIUM SERPL-SCNC: 138 MMOL/L (ref 136–145)
TOTAL IRON BINDING CAPACITY: 255 UG/DL (ref 250–450)
TRANSFERRIN SERPL-MCNC: 172 MG/DL (ref 200–375)
WBC # BLD AUTO: 5.13 K/UL (ref 3.9–12.7)

## 2024-05-31 PROCEDURE — 85730 THROMBOPLASTIN TIME PARTIAL: CPT | Performed by: INTERNAL MEDICINE

## 2024-05-31 PROCEDURE — 36415 COLL VENOUS BLD VENIPUNCTURE: CPT | Performed by: INTERNAL MEDICINE

## 2024-05-31 PROCEDURE — 85610 PROTHROMBIN TIME: CPT | Performed by: INTERNAL MEDICINE

## 2024-05-31 PROCEDURE — 82728 ASSAY OF FERRITIN: CPT | Performed by: INTERNAL MEDICINE

## 2024-05-31 PROCEDURE — 93750 INTERROGATION VAD IN PERSON: CPT | Mod: ,,, | Performed by: INTERNAL MEDICINE

## 2024-05-31 PROCEDURE — 25000003 PHARM REV CODE 250: Performed by: INTERNAL MEDICINE

## 2024-05-31 PROCEDURE — 27000248 HC VAD-ADDITIONAL DAY

## 2024-05-31 PROCEDURE — 85025 COMPLETE CBC W/AUTO DIFF WBC: CPT | Performed by: INTERNAL MEDICINE

## 2024-05-31 PROCEDURE — 86140 C-REACTIVE PROTEIN: CPT | Performed by: INTERNAL MEDICINE

## 2024-05-31 PROCEDURE — 99233 SBSQ HOSP IP/OBS HIGH 50: CPT | Mod: ,,, | Performed by: PHYSICIAN ASSISTANT

## 2024-05-31 PROCEDURE — 63600175 PHARM REV CODE 636 W HCPCS: Performed by: INTERNAL MEDICINE

## 2024-05-31 PROCEDURE — 83880 ASSAY OF NATRIURETIC PEPTIDE: CPT | Performed by: INTERNAL MEDICINE

## 2024-05-31 PROCEDURE — 83615 LACTATE (LD) (LDH) ENZYME: CPT | Performed by: INTERNAL MEDICINE

## 2024-05-31 PROCEDURE — 25000003 PHARM REV CODE 250: Performed by: REGISTERED NURSE

## 2024-05-31 PROCEDURE — 84134 ASSAY OF PREALBUMIN: CPT | Performed by: INTERNAL MEDICINE

## 2024-05-31 PROCEDURE — 83735 ASSAY OF MAGNESIUM: CPT | Performed by: INTERNAL MEDICINE

## 2024-05-31 PROCEDURE — 80076 HEPATIC FUNCTION PANEL: CPT | Performed by: INTERNAL MEDICINE

## 2024-05-31 PROCEDURE — 99222 1ST HOSP IP/OBS MODERATE 55: CPT | Mod: ,,, | Performed by: STUDENT IN AN ORGANIZED HEALTH CARE EDUCATION/TRAINING PROGRAM

## 2024-05-31 PROCEDURE — 0JPV3XZ REMOVAL OF TUNNELED VASCULAR ACCESS DEVICE FROM UPPER EXTREMITY SUBCUTANEOUS TISSUE AND FASCIA, PERCUTANEOUS APPROACH: ICD-10-PCS | Performed by: INTERNAL MEDICINE

## 2024-05-31 PROCEDURE — 83540 ASSAY OF IRON: CPT | Performed by: INTERNAL MEDICINE

## 2024-05-31 PROCEDURE — 84100 ASSAY OF PHOSPHORUS: CPT | Performed by: INTERNAL MEDICINE

## 2024-05-31 PROCEDURE — 80048 BASIC METABOLIC PNL TOTAL CA: CPT | Performed by: INTERNAL MEDICINE

## 2024-05-31 RX ORDER — METOLAZONE 10 MG/1
10 TABLET ORAL
Qty: 30 TABLET | Refills: 1 | Status: ON HOLD | OUTPATIENT
Start: 2024-05-31

## 2024-05-31 RX ORDER — WARFARIN 2.5 MG/1
2.5 TABLET ORAL DAILY
Status: ON HOLD
Start: 2024-05-31

## 2024-05-31 RX ORDER — BUMETANIDE 1 MG/1
4 TABLET ORAL 2 TIMES DAILY
Status: DISCONTINUED | OUTPATIENT
Start: 2024-05-31 | End: 2024-05-31 | Stop reason: HOSPADM

## 2024-05-31 RX ORDER — AMOXICILLIN 500 MG/1
500 CAPSULE ORAL EVERY 12 HOURS
Qty: 60 CAPSULE | Refills: 12 | Status: SHIPPED | OUTPATIENT
Start: 2024-05-31 | End: 2024-05-31

## 2024-05-31 RX ORDER — ATORVASTATIN CALCIUM 40 MG/1
40 TABLET, FILM COATED ORAL NIGHTLY
Qty: 30 TABLET | Refills: 11 | Status: ON HOLD | OUTPATIENT
Start: 2024-05-31 | End: 2025-05-31

## 2024-05-31 RX ORDER — FOLIC ACID 1 MG/1
1 TABLET ORAL DAILY
Qty: 30 TABLET | Refills: 11 | Status: ON HOLD | OUTPATIENT
Start: 2024-06-01 | End: 2025-06-01

## 2024-05-31 RX ORDER — AMOXICILLIN 500 MG/1
1000 CAPSULE ORAL EVERY 12 HOURS
Qty: 120 CAPSULE | Refills: 12 | Status: ON HOLD | OUTPATIENT
Start: 2024-05-31

## 2024-05-31 RX ADMIN — BUMETANIDE 2 MG: 0.25 INJECTION INTRAMUSCULAR; INTRAVENOUS at 08:05

## 2024-05-31 RX ADMIN — CHOLECALCIFEROL TAB 25 MCG (1000 UNIT) 1000 UNITS: 25 TAB at 08:05

## 2024-05-31 RX ADMIN — VENLAFAXINE 37.5 MG: 37.5 TABLET ORAL at 08:05

## 2024-05-31 RX ADMIN — METOLAZONE 10 MG: 5 TABLET ORAL at 08:05

## 2024-05-31 RX ADMIN — AMIODARONE HYDROCHLORIDE 400 MG: 200 TABLET ORAL at 08:05

## 2024-05-31 RX ADMIN — OMEGA-3-ACID ETHYL ESTERS 2 G: 1 CAPSULE, LIQUID FILLED ORAL at 08:05

## 2024-05-31 RX ADMIN — FAMOTIDINE 20 MG: 20 TABLET ORAL at 08:05

## 2024-05-31 RX ADMIN — HYDRALAZINE HYDROCHLORIDE 100 MG: 50 TABLET ORAL at 05:05

## 2024-05-31 RX ADMIN — FOLIC ACID 1 MG: 1 TABLET ORAL at 08:05

## 2024-05-31 RX ADMIN — AMOXICILLIN 500 MG: 500 CAPSULE ORAL at 08:05

## 2024-05-31 RX ADMIN — GABAPENTIN 100 MG: 100 CAPSULE ORAL at 08:05

## 2024-05-31 RX ADMIN — Medication 400 MG: at 08:05

## 2024-05-31 NOTE — ASSESSMENT & PLAN NOTE
Radha Abbott is a 64 year old man with a history of LVAD (12/1/23) and AICD who was originally admitted on 4/16 with E faecalis bacteremia presents for hospital follow-up.  Source of bacteremia remained unclear.  Per patient's wife, his drive line exit site looked clean.   TTE was negative for vegetation.  However TTE does not rule out valvular or lead vegetation, so decision was made to treat for presumptive endocarditis for 6 weeks with ceftriaxone and ampicillin- EOT 5/30/24. Seen in ID clinic 5/10 and 5/23. He was admitted on 5/28 for after two falls with foot drop. Also found to be volume overloaded with an IVÁN. ID was consulted for end of care for antibiotics. He is pending discharge today. His ceftriaxone/ampicillin ended on 5/30 and transitioned to PO amoxicillin. Blood cultures were drawn on admit and no growth. His line has been removed. No issues with DLES.      Recommendations  - agree with stopping ceftriaxone and ampicillin  - agree with line removal  - increase renally dosed amoxicillin 1000 mg q12 hours   - will arrange follow up in ID clinic

## 2024-05-31 NOTE — ASSESSMENT & PLAN NOTE
-possibly secondary to dilutional anemia in the setting of hypervolemia.   -Also in the setting of renal failure  - No  overt signs of bleeding   -Received Epo yesterday and will arrange for outpatient administration   -Iron studies done and not candidate for IV iron  -Trend CBCs  -Will follow up with nephrology as OP

## 2024-05-31 NOTE — ASSESSMENT & PLAN NOTE
-SrCr 6.4 on admission  -Baseline ~4 as of recent   -Nephrology following and will arrange for outpatient follow up with Nephrology as well as Heme Onc for Epo injections   -Trend BMPs   -Diuresis as above

## 2024-05-31 NOTE — CONSULTS
[facility] - Cardiology Stepdown  Infectious Disease  Consult Note    Patient Name: Radha Abbott  MRN: [id number]  Admission Date: 5/28/2024  Hospital Length of Stay: 3 days  Attending Physician: Sajan Hurley, *  Primary Care Provider: Vasu Kong MD     Isolation Status: Contact    Patient information was obtained from patient and ER records.      Inpatient consult to Infectious Diseases  Consult performed by: Xin Brand MD  Consult ordered by: Larissa Jesus PA        Assessment/Plan:     ID  Bacteremia due to Enterococcus  Radha Abbott is a 64 year old man with a history of LVAD (12/1/23) and AICD who was originally admitted on 4/16 with E faecalis bacteremia presents for hospital follow-up.  Source of bacteremia remained unclear.  Per patient's wife, his drive line exit site looked clean.   TTE was negative for vegetation.  However TTE does not rule out valvular or lead vegetation, so decision was made to treat for presumptive endocarditis for 6 weeks with ceftriaxone and ampicillin- EOT 5/30/24. Seen in ID clinic 5/10 and 5/23. He was admitted on 5/28 for after two falls with foot drop. Also found to be volume overloaded with an IVÁN. ID was consulted for end of care for antibiotics. He is pending discharge today. His ceftriaxone/ampicillin ended on 5/30 and transitioned to PO amoxicillin. Blood cultures were drawn on admit and no growth. His line has been removed. No issues with DLES.      Recommendations  - agree with stopping ceftriaxone and ampicillin  - agree with line removal  - increase renally dosed amoxicillin 1000 mg q12 hours   - will arrange follow up in ID clinic       Above discussed with primary team.     Time: 55 minutes   50% of time spent on face-to-face counseling and coordination of care. Counseling included review of test results, diagnosis, and treatment plan with patient and/or family.  I have reviewed hospital notes from HT service and other specialty providers.  I have also reviewed CBC, CMP/BMP,  cultures and imaging with my interpretation as documented.     Thank you for your consult. I will sign off. Please contact us if you have any additional questions.    Xin Brand MD  Infectious Disease  [facility] - Cardiology Stepdown    Subjective:     Principal Problem: Weakness of foot, right    HPI: Radha Abbott is a 64 year old man with a history of LVAD (12/1/23) and AICD who was originally admitted on 4/16 with E faecalis bacteremia presents for hospital follow-up.  Source of bacteremia remained unclear.  Per patient's wife, his drive line exit site looked clean.   TTE was negative for vegetation.  However TTE does not rule out valvular or lead vegetation, so decision was made to treat for presumptive endocarditis for 6 weeks with ceftriaxone and ampicillin- EOT 5/30/24. Seen in ID clinic 5/10 and 5/23. Was tolerating treatment. He was admitted on 5/28 for after two falls with foot drop. Also found to be volume overloaded with an IVÁN. ID was consulted for end of care for antibiotics. He is pending discharge today.    He feels well, completed antibiotics at home without any issue. His ceftriaxone/ampicillin ended on 5/30 and transitioned to PO amoxicillin. Blood cultures were drawn on admit and no growth. His line has been removed. No issues with DLES.      Past Medical History:   Diagnosis Date    CAD (coronary artery disease)     CHF (congestive heart failure)     Diabetes mellitus     HFrEF (heart failure with reduced ejection fraction)     ICD (implantable cardioverter-defibrillator) in place     MI, old        Past Surgical History:   Procedure Laterality Date    ANGIOPLASTY-VENOUS ARTERY Right 12/1/2023    Procedure: ANGIOPLASTY-VENOUS ARTERY, RIGHT FEMORAL;  Surgeon: Yuri Washington MD;  Location: [facility] OR [address] [place] [address];  Service: Cardiovascular;  Laterality: Right;    AORTIC VALVULOPLASTY N/A 12/1/2023    Procedure: REPAIR, AORTIC VALVE;  Surgeon: Yuri Washington MD;   Location: [facility] OR 2ND FLR;  Service: Cardiovascular;  Laterality: N/A;    CARDIAC SURGERY      CLOSURE N/A 12/1/2023    Procedure: CLOSURE, TEMPORARY;  Surgeon: Yuri Washington MD;  Location: [facility] OR 2ND FLR;  Service: Cardiovascular;  Laterality: N/A;    DRAINAGE OF PLEURAL EFFUSION  12/4/2023    Procedure: DRAINAGE, PLEURAL EFFUSION;  Surgeon: Yuri Washington MD;  Location: [facility] OR [facility] FLR;  Service: Cardiovascular;;    INSERTION OF GRAFT TO PERICARDIUM  12/4/2023    Procedure: INSERTION, GRAFT, PERICARDIUM;  Surgeon: Yuri Washington MD;  Location: [facility] OR 2ND FLR;  Service: Cardiovascular;;    INSERTION OF INTRA-AORTIC BALLOON ASSIST DEVICE Right 11/21/2023    Procedure: INSERTION, INTRA-AORTIC BALLOON PUMP;  Surgeon: Finn Cohn MD;  Location: [facility] CATH LAB;  Service: Cardiology;  Laterality: Right;    LEFT VENTRICULAR ASSIST DEVICE Left 12/1/2023    Procedure: INSERTION-LEFT VENTRICULAR ASSIST DEVICE;  Surgeon: Yuri Washington MD;  [facility];  Service: Cardiovascular;  Laterality: Left;  REDO STERNOTOMY - REDO SAW NEEDED FOR CASE    LYSIS OF ADHESIONS  12/1/2023    Procedure: LYSIS, ADHESIONS;  Surgeon: Yuri Washington MD;  [facility];  Service: Cardiovascular;;    PLACEMENT OF SWAN ROLANDO CATHETER WITH IMAGING GUIDANCE  11/20/2023    Procedure: INSERTION, CATHETER, SWAN-ROLANDO, WITH IMAGING GUIDANCE;  Surgeon: Sajan Hurley MD;  Location: [facility] CATH LAB;  Service: Cardiology;;    REMOVAL OF TUNNELED CENTRAL VENOUS CATHETER (CVC) N/A 3/1/2024    Procedure: REMOVAL, CATHETER, CENTRAL VENOUS, TUNNELED;  Surgeon: Seble Aguilar MD;  Location: [facility] CATH LAB;  Service: Interventional Nephrology;  Laterality: N/A;    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 12/1/2023    Procedure: REPAIR, ANEURYSM, ARTERY, FEMORAL;  Surgeon: Yuri Washington MD;  Location: [facility] OR 2ND FLR;  Service: Cardiovascular;  Laterality: Right;  Right Femoral Artery Repair    RIGHT HEART  CATHETERIZATION Right 10/10/2023    Procedure: INSERTION, CATHETER, RIGHT HEART;  Surgeon: Bin Gandhi MD;  Location: [facility] CATH LAB;  Service: Cardiology;  Laterality: Right;    RIGHT HEART CATHETERIZATION Right 10/13/2023    Procedure: INSERTION, CATHETER, RIGHT HEART;  Surgeon: Walter Mcintyre MD;  Location: [facility] CATH LAB;  Service: Cardiology;  Laterality: Right;    RIGHT HEART CATHETERIZATION  11/13/2023    RIGHT HEART CATHETERIZATION Right 11/13/2023    Procedure: INSERTION, CATHETER, RIGHT HEART;  Surgeon: Juventino Bermudez Jr., MD;  Location: [facility] CATH LAB;  Service: Cardiology;  Laterality: Right;    RIGHT HEART CATHETERIZATION Right 11/20/2023    Procedure: INSERTION, CATHETER, RIGHT HEART;  Surgeon: Sajan Hurley MD;  Location: [facility] CATH LAB;  Service: Cardiology;  Laterality: Right;    RIGHT HEART CATHETERIZATION Right 1/22/2024    Procedure: INSERTION, CATHETER, RIGHT HEART;  Surgeon: Brayan Ocampo MD;  Location: [facility] CATH LAB;  Service: Cardiology;  Laterality: Right;    STERNAL WOUND CLOSURE N/A 12/4/2023    Procedure: CLOSURE, WOUND, STERNUM;  Surgeon: Yuri Washington MD;  [facility];  Service: Cardiovascular;  Laterality: N/A;    STERNOTOMY N/A 12/1/2023    Procedure: STERNOTOMY, REDO;  Surgeon: Yuri Washington MD;  [facility];  Service: Cardiovascular;  Laterality: N/A;    VALVULOPLASTY, MITRAL VALVE N/A 12/1/2023    Procedure: VALVULOPLASTY, MITRAL VALVE;  Surgeon: Yuri Washington MD;  [facility];  Service: Cardiovascular;  Laterality: N/A;       Review of patient's allergies indicates:  No Known Allergies    Medications:  Medications Prior to Admission   Medication Sig    acetaminophen (TYLENOL) 500 MG tablet Take 2 tablets (1,000 mg total) by mouth every 8 (eight) hours as needed for Pain.    amiodarone (PACERONE) 400 MG tablet Take 1 tablet (400 mg total) by mouth once daily.    bumetanide (BUMEX) 2 MG tablet Take 2 tablets (4 mg  total) by mouth 2 (two) times a day.    famotidine (PEPCID) 20 MG tablet Take 1 tablet (20 mg total) by mouth once daily.    fluticasone propionate (FLONASE) 50 mcg/actuation nasal spray 2 sprays (100 mcg total) by Each Nostril route once daily.    gabapentin (NEURONTIN) 100 MG capsule Take 1 capsule (100 mg total) by mouth 2 (two) times daily.    hydrALAZINE (APRESOLINE) 100 MG tablet Take 1 tablet (100 mg total) by mouth every 8 (eight) hours.    magnesium oxide (MAG-OX) 400 mg (241.3 mg magnesium) tablet Take 1 tablet (400 mg total) by mouth once daily.    omega-3 acid ethyl esters (LOVAZA) 1 gram capsule Take 2 capsules (2 g total) by mouth 2 (two) times daily.    venlafaxine (EFFEXOR) 37.5 MG Tab Take 1 tablet (37.5 mg total) by mouth once daily.    vitamin D (VITAMIN D3) 1000 units Tab Take 1 tablet (1,000 Units total) by mouth once daily.    [DISCONTINUED] dextrose 5 % in water (D5W) PgBk 100 mL with cefTRIAXone 2 gram SolR 2 g Inject 2 g into the vein every 12 (twelve) hours.    [DISCONTINUED] metOLazone (ZAROXOLYN) 5 MG tablet Take 1 tablet (5 mg total) by mouth once weekly as needed on Wednesdays only if >5lb weight gain in 1 week    [DISCONTINUED] sodium bicarbonate 325 MG tablet Take 1 tablet (325 mg total) by mouth 3 (three) times daily.    [DISCONTINUED] sodium chloride 0.9 % PgBk 100 mL with ampicillin 2 gram SolR 2 g Inject 2 g into the vein every 12 (twelve) hours.    [DISCONTINUED] warfarin (COUMADIN) 2.5 MG tablet Take 3.75mg orally daily on Tuesday and Thursdays. Take 2.5mg orally daily on all other days.    pulse oximeter (PULSE OXIMETER) device by Apply Externally route 2 (two) times a day. Use twice daily at 8 AM and 3 PM and [facility] as directed.    senna-docusate 8.6-50 mg (PERICOLACE) 8.6-50 mg per tablet Take 2 tablets by mouth daily as needed for Constipation.    traZODone (DESYREL) 50 MG tablet Take 0.5 tablets (25 mg total) by mouth every evening.     Antibiotics  (From admission, onward)      Start     Stop Route Frequency Ordered    05/30/24 2100  amoxicillin capsule 500 mg         -- Oral Every 12 hours 05/30/24 1346          Antifungals (From admission, onward)      None          Antivirals (From admission, onward)      None             Immunization History   Administered Date(s) Administered    COVID-19 MRNA, LN-S PF (MODERNA HALF 0.25 ML DOSE) 12/13/2021    COVID-19, MRNA, LN-S, PF (MODERNA FULL 0.5 ML DOSE) 03/18/2021, 04/15/2021    DTaP 06/27/2012    Hepatitis A, Adult 11/17/2023    Hepatitis B (recombinant) Adjuvanted, 2 dose 09/03/2023, 11/17/2023    Influenza - Quadrivalent 11/09/2021    PPD Test 12/22/2023    Pneumococcal Conjugate - 20 Valent 09/03/2023    RSVpreF (Arexvy) 09/03/2023    Tdap 06/27/2012, 09/03/2023    Zoster Recombinant 09/03/2023       Family History    None       Social History     Socioeconomic History    Marital status:    Tobacco Use    Smoking status: Former     Current packs/day: 0.50     Types: Cigarettes   Substance and Sexual Activity    Alcohol use: Yes     Comment: rarely    Drug use: No    Sexual activity: Not Currently     Partners: Female     Social Determinants of Health     Financial Resource Strain: High Risk (3/26/2024)    Overall Financial Resource Strain (CARDIA)     Difficulty of Paying Living Expenses: Hard   Food Insecurity: Food Insecurity Present (3/26/2024)    Hunger Vital Sign     Worried About Running Out of Food in the Last Year: Sometimes true     Ran Out of Food in the Last Year: Often true   Transportation Needs: No Transportation Needs (3/26/2024)    PRAPARE - Transportation     Lack of Transportation (Medical): No     Lack of Transportation (Non-Medical): No   Physical Activity: Inactive (2/29/2024)    Exercise Vital Sign     Days of Exercise per Week: 2 days     Minutes of Exercise per Session: 0 min   Stress: Stress Concern Present (3/26/2024)    [employer] [place] [employer] - Occupational  Stress Questionnaire     Feeling of Stress : To some extent   Housing Stability: High Risk (3/26/2024)    Housing Stability Vital Sign     Unable to Pay for Housing in the Last Year: Yes     Number of Places Lived in the Last Year: 1     Unstable Housing in the Last Year: No     Review of Systems   Constitutional:  Positive for fatigue. Negative for fever.   Musculoskeletal:  Positive for joint swelling.     Objective:     Vital Signs (Most Recent):  Temp: 97.7 °F (36.5 °C) (05/31/24 1226)  Pulse: 82 (05/31/24 1247)  Resp: 20 (05/31/24 1226)  BP: (!) 82/0 (05/31/24 1247)  SpO2: 96 % (05/31/24 1226) Vital Signs (24h Range):  Temp:  [97.3 °F (36.3 °C)-98.3 °F (36.8 °C)] 97.7 °F (36.5 °C)  Pulse:  [54-91] 82  Resp:  [16-23] 20  SpO2:  [95 %-99 %] 96 %  BP: ()/(0-90) 82/0     Weight: 81.2 kg (179 lb 0.2 oz)  Body mass index is 22.98 kg/m².    Estimated Creatinine Clearance: 14.2 mL/min (A) (based on SCr of 6.2 mg/dL (H)).     Physical Exam  Vitals and nursing note reviewed.   Constitutional:       General: He is not in acute distress.     Appearance: Normal appearance.   HENT:      Mouth/Throat:      Mouth: Mucous membranes are moist.      Pharynx: No oropharyngeal exudate.   Pulmonary:      Effort: Pulmonary effort is normal. No respiratory distress.   Abdominal:      Palpations: Abdomen is soft.      Tenderness: There is no abdominal tenderness.   Neurological:      General: No focal deficit present.   Psychiatric:         Mood and Affect: Mood normal.         Behavior: Behavior normal.          Significant Labs:   Microbiology Results (last 7 days)       Procedure Component Value Units Date/Time    Blood culture [[id number]] Collected: 05/29/24 2102    Order Status: Completed Specimen: Blood from Peripheral, Upper Arm, Right Updated: 05/30/24 2213     Blood Culture, Routine No Growth to date      No Growth to date    Blood culture [[id number]] Collected: 05/29/24 2050    Order Status: Completed Specimen: Blood  from Peripheral, Antecubital, Left Updated: 05/30/24 0818     Blood Culture, Routine No Growth to date      No Growth to date    Blood culture [[id number]]     Order Status: Canceled Specimen: Blood     Blood culture [[id number]]     Order Status: Canceled Specimen: Blood             Significant Imaging: I have reviewed all pertinent imaging results/findings within the past 24 hours.

## 2024-05-31 NOTE — ASSESSMENT & PLAN NOTE
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 Unable to obtain MRI due to VAD   -Neuropathy labs pending   -PT/OT consult and brace placed on foot with good response

## 2024-05-31 NOTE — ASSESSMENT & PLAN NOTE
-HeartMate 3 Implanted  12/1/2023  as DT  -Cont Coumadin, dosing by PharmD.  Goal INR 2.0-3.0 . therapeutic today.   -Antiplatelets Not on ASA due to low H/H on implant   -LDH is stable overall today. Will continue to monitor daily.  -Speed set at  5000     -Interrogation notable for no events  -ECHO 4/18/24 with speed at 5000 EF: 10-15%, LVEDD: 6.1cm, RV size normal function mod reduced   -RHC 1/22/24 with RA 13, PCWP 22, PA 50/25, CO 5.8, CI 3.0   -Not listed for OHTx, declined for OHTX due to comorbidities       Procedure: Device Interrogation Including analysis of device parameters  Current Settings: Ventricular Assist Device  Review of device function is stable  VAD interrogation with no notable events           5/31/2024     8:03 AM 5/31/2024     5:00 AM 5/31/2024     1:01 AM 5/30/2024     7:47 PM 5/30/2024     4:33 PM 5/30/2024    12:00 PM 5/30/2024     8:30 AM   TXP LVAD INTERROGATIONS   Type HeartMate3 HeartMate3 HeartMate3 HeartMate3 HeartMate3 HeartMate3    Flow 3.9 3.9 3.8 3.8 4 3.8 3.6   Speed 5050 5050 5050 5050 5000 5000 5000   PI 4.5 4.6 5.7 6.3 5 6.4 5.9   Power ([place]) 3.5 3.3 3.4 3.4 3.5 3.6 3.6   LSL 4600 4600 4600 4600 4600     Pulsatility Pulse Intermittent pulse Intermittent pulse  Intermittent pulse

## 2024-05-31 NOTE — NURSING
Patient is ready for discharge. Patient stable alert and oriented. PIV removed. Telemonitor removed. LVAD equipment reconciliation done. No complaints of pain. Discussed discharge plan. Reviewed medications and side effects, appointments, and answered questions with patient and spouse. Verbalized understanding, no further questions. Patient left the floor on wheelchair with spouse.

## 2024-05-31 NOTE — SUBJECTIVE & OBJECTIVE
Interval History: No acute events overnight. Creatinine remains high but stable. Received Epo shots yesterday and tunneled line removed. Responded well to IV Bumex and looks more euvolemic today.  Net negative 1.2L since admission.  Weight is 179lbs today down from 186lbs on admission.  Patient is eager to go home so will discharge him today with Nephrology follow up as well as Heme/Onc to arrange outpatient Epo injections. Patient reports gait is much better with assistance of foot brace     Scheduled Meds:   amiodarone  400 mg Oral Daily    amoxicillin  500 mg Oral Q12H    atorvastatin  40 mg Oral QHS    bumetanide  4 mg Oral BID loop    famotidine  20 mg Oral Daily    fluticasone propionate  2 spray Each Nostril Daily    folic acid  1 mg Oral Daily    gabapentin  100 mg Oral BID    hydrALAZINE  100 mg Oral Q8H    magnesium oxide  400 mg Oral Daily    metOLazone  10 mg Oral Daily    omega-3 acid ethyl esters  2 g Oral BID    traZODone  25 mg Oral QHS    venlafaxine  37.5 mg Oral Daily    vitamin D  1,000 Units Oral Daily    warfarin  2.5 mg Oral Daily     PRN Meds:  Current Facility-Administered Medications:     acetaminophen, 650 mg, Oral, Q6H PRN    senna-docusate 8.6-50 mg, 2 tablet, Oral, Daily PRN    Review of patient's allergies indicates:  No Known Allergies  Objective:     Vital Signs (Most Recent):  Temp: 97.6 °F (36.4 °C) (05/31/24 0757)  Pulse: 81 (05/31/24 1122)  Resp: 20 (05/31/24 0757)  BP: (!) 102/0 (05/31/24 0803)  SpO2: 99 % (05/31/24 0757) Vital Signs (24h Range):  Temp:  [97.3 °F (36.3 °C)-98.3 °F (36.8 °C)] 97.6 °F (36.4 °C)  Pulse:  [54-91] 81  Resp:  [16-23] 20  SpO2:  [95 %-99 %] 99 %  BP: ()/(0-90) 102/0     Patient Vitals for the past 72 hrs (Last 3 readings):   Weight   05/31/24 0500 81.2 kg (179 lb 0.2 oz)   05/30/24 1215 83.7 kg (184 lb 8.4 oz)   05/30/24 0828 83.7 kg (184 lb 8.4 oz)     Body mass index is 22.98 kg/m².      Intake/Output Summary (Last 24 hours) at 5/31/2024  1215  Last data filed at 5/31/2024 0800  Gross per 24 hour   Intake 1106.18 ml   Output 875 ml   Net 231.18 ml       Hemodynamic Parameters:       Telemetry: NSR        Physical Exam  Constitutional:       Appearance: Normal appearance.   HENT:      Head: Normocephalic and atraumatic.   Neck:      Comments: JVD present   Cardiovascular:      Rate and Rhythm: Normal rate and regular rhythm.      Comments: Smooth VAD hum   Pulmonary:      Effort: Pulmonary effort is normal.      Breath sounds: Normal breath sounds.   Abdominal:      General: Bowel sounds are normal.      Palpations: Abdomen is soft.   Musculoskeletal:      Cervical back: Normal range of motion and neck supple.      Right lower leg: Edema present.      Left lower leg: Edema present.      Comments: R foot weakness/some numbness    Neurological:      General: No focal deficit present.      Mental Status: He is alert and oriented to person, place, and time.      Motor: Weakness present.      Comments: R foot weakness             Significant Labs:  CBC:  Recent Labs   Lab 05/29/24  0530 05/30/24  0531 05/31/24  0347   WBC 4.99 5.23 5.13   RBC 2.11* 2.18* 2.22*   HGB 6.4* 6.5* 6.5*   HCT 20.0* 20.6* 21.8*    244 263   MCV 95 95 98   MCH 30.3 29.8 29.3   MCHC 32.0 31.6* 29.8*     BNP:  Recent Labs   Lab 05/29/24  0530 05/31/24  0347   BNP 1,429* 1,131*     CMP:  Recent Labs   Lab 05/27/24  1636 05/28/24  0559 05/29/24  0530 05/30/24  0531 05/31/24  0347   *   < > 106 114* 73   CALCIUM 9.1   < > 9.2 9.6 9.3   ALBUMIN 2.8*  --  2.6*  --  2.5*   PROT 8.1  --  7.3  --  7.2      < > 141 140 138   K 3.1*   < > 4.1 3.8 3.8   CO2 29   < > 26 29 25   CL 97   < > 101 99 98   BUN 66*   < > 67* 61* 59*   CREATININE 6.6*   < > 6.4* 6.1* 6.2*   ALKPHOS 112  --  113  --  108   ALT 30  --  29  --  36   AST 27  --  26  --  35   BILITOT 0.2  --  0.2  --  0.2    < > = values in this interval not displayed.      Coagulation:   Recent Labs   Lab  05/29/24 0530 05/30/24 0531 05/31/24  0347   INR 2.5* 2.6* 2.8*   APTT 45.4* 47.1* 50.0*     LDH:  Recent Labs   Lab 05/29/24 0530 05/30/24 0531 05/31/24  0347   * 367* 384*     Microbiology:  Microbiology Results (last 7 days)       Procedure Component Value Units Date/Time    Blood culture [[id number]] Collected: 05/29/24 2102    Order Status: Completed Specimen: Blood from Peripheral, Upper Arm, Right Updated: 05/30/24 2213     Blood Culture, Routine No Growth to date      No Growth to date    Blood culture [[id number]] Collected: 05/29/24 2050    Order Status: Completed Specimen: Blood from Peripheral, Antecubital, Left Updated: 05/30/24 2213     Blood Culture, Routine No Growth to date      No Growth to date    Blood culture [[id number]]     Order Status: Canceled Specimen: Blood     Blood culture [[id number]]     Order Status: Canceled Specimen: Blood             I have reviewed all pertinent labs within the past 24 hours.    Estimated Creatinine Clearance: 14.2 mL/min (A) (based on SCr of 6.2 mg/dL (H)).    Diagnostic Results:  I have reviewed all pertinent imaging results/findings within the past 24 hours.

## 2024-05-31 NOTE — SUBJECTIVE & OBJECTIVE
Interval History: Patient seen and examined this AM. Overnight with reported hypertension via Doppler. Afebrile with pulse ranging from 80-70s bpm. He is saturating +93% on room with documented UOP of ~1.675 liters. Renal function with some improvement today. Per Cardiology service plan to diuresis a further prior to discharge.    Review of patient's allergies indicates:  No Known Allergies  Current Facility-Administered Medications   Medication Frequency    acetaminophen tablet 650 mg Q6H PRN    amiodarone tablet 400 mg Daily    amoxicillin capsule 500 mg Q12H    atorvastatin tablet 40 mg QHS    bumetanide injection 2 mg BID    famotidine tablet 20 mg Daily    fluticasone propionate 50 mcg/actuation nasal spray 100 mcg Daily    folic acid tablet 1 mg Daily    gabapentin capsule 100 mg BID    hydrALAZINE tablet 100 mg Q8H    magnesium oxide tablet 400 mg Daily    metOLazone tablet 10 mg Daily    omega-3 acid ethyl esters capsule 2 g BID    senna-docusate 8.6-50 mg per tablet 2 tablet Daily PRN    trazodone split tablet 25 mg QHS    venlafaxine tablet 37.5 mg Daily    vitamin D 1000 units tablet 1,000 Units Daily    warfarin (COUMADIN) tablet 2.5 mg Daily       Objective:     Vital Signs (Most Recent):  Temp: 97.6 °F (36.4 °C) (05/31/24 0757)  Pulse: 88 (05/31/24 0803)  Resp: 20 (05/31/24 0757)  BP: (!) 102/0 (05/31/24 0803)  SpO2: 99 % (05/31/24 0757) Vital Signs (24h Range):  Temp:  [97.3 °F (36.3 °C)-98.7 °F (37.1 °C)] 97.6 °F (36.4 °C)  Pulse:  [54-91] 88  Resp:  [16-23] 20  SpO2:  [92 %-99 %] 99 %  BP: ()/(0-90) 102/0     Weight: 81.2 kg (179 lb 0.2 oz) (05/31/24 0500)  Body mass index is 22.98 kg/m².  Body surface area is 2.06 meters squared.    I/O last 3 completed shifts:  In: 1286.2 [P.O.:840; IV Piggyback:446.2]  Out: 2175 [Urine:2175]     Physical Exam  Vitals and nursing note reviewed.   Constitutional:       General: He is awake. He is not in acute distress.     Appearance: He is ill-appearing. He  is not diaphoretic.   HENT:      Head: Normocephalic and atraumatic.      Right Ear: External ear normal.      Left Ear: External ear normal.      Nose: Nose normal.      Mouth/Throat:      Mouth: Mucous membranes are moist.      Pharynx: Oropharynx is clear. No oropharyngeal exudate or posterior oropharyngeal erythema.   Eyes:      General: No scleral icterus.        Right eye: No discharge.         Left eye: No discharge.      Extraocular Movements: Extraocular movements intact.      Conjunctiva/sclera: Conjunctivae normal.   Neck:      Comments: Trialysis catheter to left internal jugular vein.   Cardiovascular:      Rate and Rhythm: Normal rate.      Heart sounds: Murmur (humming sound, VAD) heard.      Systolic murmur is present.      No friction rub. No gallop.      Comments: There is bilateral pitting lower extremity edema which extends proximally.  Pulmonary:      Effort: Pulmonary effort is normal. No respiratory distress.      Breath sounds: Rales present. No wheezing or rhonchi.      Comments: Faint bibasilar crackles appreciated.  Chest:      Comments: Drive line to right lower quadrant. Tunneled catheter to right chest wall.  Abdominal:      General: Bowel sounds are normal. There is no distension.      Palpations: Abdomen is soft.   Musculoskeletal:      Cervical back: Neck supple.      Right lower le+ Pitting Edema present.      Left lower le+ Pitting Edema present.   Skin:     General: Skin is warm and dry.      Coloration: Skin is not jaundiced.   Neurological:      General: No focal deficit present.      Mental Status: He is alert. Mental status is at baseline.      Cranial Nerves: No cranial nerve deficit.      Motor: Weakness present.   Psychiatric:         Mood and Affect: Mood normal.         Behavior: Behavior normal. Behavior is cooperative.          Significant Labs:  BMP:   Recent Labs   Lab 24  0347   GLU 73      K 3.8   CL 98   CO2 25   BUN 59*   CREATININE 6.2*    CALCIUM 9.3   MG 1.8     CBC:   Recent Labs   Lab 05/31/24 0347   WBC 5.13   RBC 2.22*   HGB 6.5*   HCT 21.8*      MCV 98   MCH 29.3   MCHC 29.8*     CMP:   Recent Labs   Lab 05/31/24 0347   GLU 73   CALCIUM 9.3   ALBUMIN 2.5*   PROT 7.2      K 3.8   CO2 25   CL 98   BUN 59*   CREATININE 6.2*   ALKPHOS 108   ALT 36   AST 35   BILITOT 0.2     Coagulation:   Recent Labs   Lab 05/31/24 0347   INR 2.8*   APTT 50.0*     LFTs:   Recent Labs   Lab 05/31/24 0347   ALT 36   AST 35   ALKPHOS 108   BILITOT 0.2   PROT 7.2   ALBUMIN 2.5*     Microbiology Results (last 7 days)       Procedure Component Value Units Date/Time    Blood culture [[id number]] Collected: 05/29/24 2102    Order Status: Completed Specimen: Blood from Peripheral, Upper Arm, Right Updated: 05/30/24 2213     Blood Culture, Routine No Growth to date      No Growth to date    Blood culture [[id number]] Collected: 05/29/24 2050    Order Status: Completed Specimen: Blood from Peripheral, Antecubital, Left Updated: 05/30/24 2213     Blood Culture, Routine No Growth to date      No Growth to date    Blood culture [[id number]]     Order Status: Canceled Specimen: Blood     Blood culture [[id number]]     Order Status: Canceled Specimen: Blood           Specimen (24h ago, onward)      None          Recent Labs   Lab 05/29/24  1243   COLORU Colorless*   SPECGRAV 1.010   PHUR 7.0   PROTEINUA 1+*   BACTERIA None   NITRITE Negative   LEUKOCYTESUR Negative   HYALINECASTS 0      Significant Imaging:  I have reviewed all imagining in the last 24 hours.    Retroperitoneal ultrasound on 05/30/2024:  FINDINGS:  Right kidney: The right kidney measures 11.7 cm.  No cortical thinning. No loss of corticomedullary distinction.  Resistive index measures 0.77.  No mass. No renal stone. No hydronephrosis.     Left kidney: The left kidney measures 11.7 cm. No cortical thinning. No loss of corticomedullary distinction.  Resistive index measures 0.79.  No mass. No  renal stone. No hydronephrosis.     The bladder is partially distended at the time of scanning and demonstrates mild wall thickening.  Prostate measures 4.8 x 3.7 x 4.7     Impression:  1. Elevated resistive indices may be seen with medical renal disease.  No hydronephrosis.  2. Enlarged prostate with mild thickening of the urinary bladder wall.

## 2024-05-31 NOTE — PROGRESS NOTES
Update/Discharge Note:    CRISTAL advised of potential dc today.  CRISTAL met with pt and his wife to discuss dc plans.  Pt was sitting up in chair with eyes closed. Pt's wife at bedside.  Wife alert and oriented, calm and cooperative.  She voiced understanding and agreement with dc plans.   Pt's wife stated that pt has been switched to oral meds and will no longer need IV abx.  SW will confirm with team.  Pt's wife will transport pt home.  No other needs reported other that resuming home health per pt's wife.    Pt will resume home health with [facility]. CRISTAL notified Jacqui with [facility] of planned dc today.  [facility] to f/up with pt. [phone]    SW remains available if needs arise.

## 2024-05-31 NOTE — DISCHARGE SUMMARY
[facility] - Cardiology Stepdown  Heart Transplant  Discharge Summary      Patient Name: Radha Abbott  MRN: [id number]  Admission Date: 5/28/2024  Hospital Length of Stay: 3 days  Discharge Date and Time: 05/31/2024 2:36 PM  Attending Physician: No att. providers found   Discharging Provider: DENIZ Faye  Primary Care Provider: Vasu Kong MD     HPI: Mr. Radha Abbott is a very pleasant 65 yo male with stage D HFrEF, ICMP with previous admission for ADHF/CS. He did undergo HM3 placement placed by Dr Washington on 12/1/23. Lengthy post op course complicated by vasoplegia (requiring Giaprezza), debility, malnutrition/impaired swallowing, and renal failure requiring HD(since weaned off). Once medically stable, he was transferred to Rehab for further PT/OT/ST. Of note, pt left with permacath still in place. He has done really well in rehab and now has continued to progress well at home. He is on a very high dose of Bumex 4 mg twice daily. VAD speed is at 5000 rpm. No VAD alarms noted on interrogation occasional PI events . He is currenlty on 6 week therapy for Enterococcus bacteremia with ampicillin and rocephin ending 5/30/24. He presented to the local ED with two falls. He says he hasn't been able to move his R foot well for two days and also can't feel well in it. He has been dragging the R foot when walking which led to mechanical falls and then he went to the ED. He also says weight went up by 2 pounds from 181 to 181 lbs and he got leg edema. No CP, SOB or LH reported. No other symptoms. CT head in OSH is unremarkable so is xray foot. He was sent for neuro work up. His Cr is elevated as well.     * No surgery found *     Hospital Course: 65 yo HM 3 on Bumex 4mg BID and currently on 6 week therapy for Enterococcus bacteremia with Ampicillin and Rocephin admitted after presenting to local ED with 2 falls.  CT of head at OSH unremarkable and he was sent for neuro workup.  Neuro reporting it wasn't a stroke  and attributed his symptoms to neuropathy that's causing foot drop.  He was placed in a brace and his gait improved.     Nephrology consulted during hospital stay due to elevated renal function.  They feel he is close to being a dialysis patient.  He diuresed on IV Bumex with prn Metolazone.  He is net negative 1.2L throughout his hospital stay and weight on day of discharge was 179lbs down from 186lbs on admit.  We continued his home dose of Bumex 4mg BID but increased his prn Metolazone to 10mg.  He received a dose of Epo while inpatient and he has plans to follow up with Heme/Onc to get outpatient therapy arranged.      During his hospital stay; he completed his 6 weeks of Ampicillin and Rocephin so tunneled line was removed and he was transtioned to Amoxicillin per ID recommendations.     He was discharged in stable condition with home health PT/OT and scheduled follow up with Heme/Onc, Nephrology and VAD clinic.     Goals of Care Treatment Preferences:  Code Status: Full Code    Health care agent: Pt's wifeArlyn is NOK  Health care agent number: No value filed.                   Consults (From admission, onward)          Status Ordering Provider     Inpatient consult to Infectious Diseases  Once        Provider:  (Not yet assigned)    Completed PAIGE SUERO     Inpatient consult to Interventional Radiology  Once        Provider:  (Not yet assigned)    Completed REBEKAH ZAZUETA     Inpatient consult to Nephrology  Once        Provider:  (Not yet assigned)    Completed REBEKAH ZAZUETA     Inpatient consult to Vascular (Stroke) Neurology  Once        Provider:  (Not yet assigned)    Completed DALLAS CHAU     Inpatient consult to Registered Dietitian/Nutritionist  Once        Provider:  (Not yet assigned)    Completed DALLAS CHAU            Significant Diagnostic Studies:See chart for full details  Retroperitoneal ultrasound 5/29  CXR: 5/28    Pending Diagnostic Studies:        Procedure Component Value Units Date/Time    IR Tunneled Cath Removal w/o Port [[id number]] Resulted: 05/30/24 1659    Order Status: Sent Lab Status: In process Updated: 05/30/24 1659    Methylmalonic acid, serum [[id number]] Collected: 05/28/24 1244    Order Status: Sent Lab Status: In process Updated: 05/28/24 1249    Specimen: Blood     Vitamin B1 [[id number]] Collected: 05/28/24 1244    Order Status: Sent Lab Status: In process Updated: 05/28/24 1249    Specimen: Blood     Vitamin B6 [[id number]] Collected: 05/28/24 1244    Order Status: Sent Lab Status: In process Updated: 05/28/24 1249    Specimen: Blood           Final Active Diagnoses:    Diagnosis Date Noted POA    PRINCIPAL PROBLEM:  Weakness of foot, right [R29.898] 05/28/2024 Yes    LVAD (left ventricular assist device) present [Z95.811] 12/01/2023 Not Applicable    Bacteremia [R78.81] 04/17/2024 Yes    Acute on chronic combined systolic and diastolic heart failure [I50.43] 10/07/2023 Yes    Acute renal failure superimposed on stage 4 chronic kidney disease [N17.9, N18.4] 05/29/2024 Yes    Ventricular tachycardia [I47.20] 10/07/2023 Yes    Anticoagulant long-term use [Z79.01] 05/28/2024 Not Applicable    CAD (coronary artery disease) [I25.10] 10/07/2023 Yes    Anemia [D64.9] 04/26/2024 Yes    Type 2 diabetes mellitus without complication, without long-term current use of insulin [E11.9] 10/07/2023 Yes    Encounter for removal of tunneled central venous catheter (CVC) with port [Z45.2] 05/30/2024 Not Applicable    Bacteremia due to Enterococcus [R78.81, B95.2] 05/02/2024 Yes      Problems Resolved During this Admission:      Discharged Condition: stable    Disposition: Home or Self Care    Follow Up:    Patient Instructions:      Ambulatory referral/consult to Neurology   Standing Status: Future   Referral Priority: Routine Referral Type: Consultation   Referral Reason: Specialty Services Required   Requested Specialty: Neurology   Number of Visits  Requested: 1     Diet Cardiac     Diet renal     Notify your health care provider if you experience any of the following:  difficulty breathing or increased cough     Notify your health care provider if you experience any of the following:  persistent dizziness, light-headedness, or visual disturbances     Activity as tolerated     Medications:  Reconciled Home Medications:      Medication List        START taking these medications      amoxicillin 500 MG capsule  Commonly known as: AMOXIL  Take 2 capsules (1,000 mg total) by mouth every 12 (twelve) hours.     atorvastatin 40 MG tablet  Commonly known as: LIPITOR  Take 1 tablet (40 mg total) by mouth every evening.     folic acid 1 MG tablet  Commonly known as: FOLVITE  Take 1 tablet (1 mg total) by mouth once daily.  Start taking on: June 1, 2024            CHANGE how you take these medications      metOLazone 10 MG tablet  Commonly known as: ZAROXOLYN  Take 1 tablet (10 mg total) by mouth as needed (Take once weekly on Wednesdays only if >5lb weight gain in 1 week).  What changed:   medication strength  how much to take     warfarin 2.5 MG tablet  Commonly known as: COUMADIN  Take 1 tablet (2.5 mg total) by mouth Daily.  What changed:   how much to take  how to take this  when to take this  additional instructions            CONTINUE taking these medications      acetaminophen 500 MG tablet  Commonly known as: TYLENOL  Take 2 tablets (1,000 mg total) by mouth every 8 (eight) hours as needed for Pain.     amiodarone 400 MG tablet  Commonly known as: PACERONE  Take 1 tablet (400 mg total) by mouth once daily.     bumetanide 2 MG tablet  Commonly known as: BUMEX  Take 2 tablets (4 mg total) by mouth 2 (two) times a day.     famotidine 20 MG tablet  Commonly known as: PEPCID  Take 1 tablet (20 mg total) by mouth once daily.     fluticasone propionate 50 mcg/actuation nasal spray  Commonly known as: FLONASE  2 sprays (100 mcg total) by Each Nostril route once daily.      gabapentin 100 MG capsule  Commonly known as: NEURONTIN  Take 1 capsule (100 mg total) by mouth 2 (two) times daily.     hydrALAZINE 100 MG tablet  Commonly known as: APRESOLINE  Take 1 tablet (100 mg total) by mouth every 8 (eight) hours.     magnesium oxide 400 mg (241.3 mg magnesium) tablet  Commonly known as: MAG-OX  Take 1 tablet (400 mg total) by mouth once daily.     omega-3 acid ethyl esters 1 gram capsule  Commonly known as: LOVAZA  Take 2 capsules (2 g total) by mouth 2 (two) times daily.     pulse oximeter device  Commonly known as: pulse oximeter  by Apply Externally route 2 (two) times a day. Use twice daily at 8 AM and 3 PM and [facility] as directed.     senna-docusate 8.6-50 mg 8.6-50 mg per tablet  Commonly known as: PERICOLACE  Take 2 tablets by mouth daily as needed for Constipation.     traZODone 50 MG tablet  Commonly known as: DESYREL  Take 0.5 tablets (25 mg total) by mouth every evening.     venlafaxine 37.5 MG Tab  Commonly known as: EFFEXOR  Take 1 tablet (37.5 mg total) by mouth once daily.     vitamin D 1000 units Tab  Commonly known as: VITAMIN D3  Take 1 tablet (1,000 Units total) by mouth once daily.            STOP taking these medications      dextrose 5 % in water (D5W) PgBk 100 mL with cefTRIAXone 2 gram SolR 2 g     sodium bicarbonate 325 MG tablet     sodium chloride 0.9 % PgBk 100 mL with ampicillin 2 gram SolR 2 g              DENIZ Faye  Heart Transplant  [facility] - Cardiology Stepdown   Consent (Near Eyelid Margin)/Introductory Paragraph: The rationale for Mohs was explained to the patient and consent was obtained. The risks, benefits and alternatives to therapy were discussed in detail. Specifically, the risks of ectropion or eyelid deformity, infection, scarring, bleeding, prolonged wound healing, incomplete removal, allergy to anesthesia, nerve injury and recurrence were addressed. Prior to the procedure, the treatment site was clearly identified and confirmed by the patient. All components of Universal Protocol/PAUSE Rule completed.

## 2024-05-31 NOTE — PLAN OF CARE
[facility]   Heart Transplant/VAD Clinic   [address] [place] [address]   [phone] after hours          [phone] fax     VAD HOME  HEALTH ORDERS      Admit to Home Health    Diagnosis:   Patient Active Problem List   Diagnosis    Ventricular tachycardia    CAD (coronary artery disease)    Acute on chronic combined systolic and diastolic heart failure    Type 2 diabetes mellitus without complication, without long-term current use of insulin    PAF (paroxysmal atrial fibrillation)    Ventricular fibrillation    Acute on chronic combined systolic and diastolic CHF, NYHA class 4    Defibrillator discharge    Palliative care encounter    Advance care planning    LVAD (left ventricular assist device) present    Congestive heart failure    Depressed mood    Moderate protein-calorie malnutrition    Dysphonia    Unilateral complete vocal fold paralysis    Oliguria    Shortness of breath    Adjustment disorder with depressed mood    Deep tissue injury    Blood blister    Lymphadenopathy    At high risk for skin breakdown    Parotid mass    Impaired mobility    Anemia due to chronic kidney disease, on chronic dialysis    At risk for amiodarone toxicity with long term use    Stage 4 chronic kidney disease    End stage heart failure    IVÁN (acute kidney injury)    Pneumonia due to COVID-19 virus    Headache    Hypertension    Weakness    Hyponatremia    Bacteremia    Pneumonia of right lung due to infectious organism    Acute systolic heart failure    Anemia    Weakness of foot, right    Anticoagulant long-term use    Acute renal failure superimposed on stage 4 chronic kidney disease    Encounter for removal of tunneled central venous catheter (CVC) with port       Patient is homebound due to:  NYHA Class IV HF. S/P LVAD placement.     Diet: Low Fat, Low cholesterol, 2Gm Na, Coumadin restrictions.    Acitivities: No Swimming, bathing, vacuuming, contact  sports.    Fresh implants= Sternal Precautions    Nursing:   SN to complete comprehensive assessment including routine vital signs. Instruct on disease process and s/s of complications to report to MD. Review/verify medication list sent home with the patient at time of discharge  and instruct patient/caregiver as needed. Frequency may be adjusted depending on start of care date.    **LVAD driveline exit site dressing change is to be completed per LVAD patient/caregiver only**.    Notify MD if:  SBP > 120 or < 80;   MAP > 80 or < 65;   HR > 120 or < 60;   Temp > 101;   Weight gain >3lbs in 1 day or 5lbs in 1 week.    LABS:  SN to perform labs: PT/INR per Coumadin clinic [phone].   Follow up INR date: 6/3/24  No Finger Sticks      CONSULTS:   Physical Therapy to evaluate and treat. Evaluate for home safety and equipment needs; Establish/upgrade home exercise program. Perform / instruct on therapeutic exercises, gait training, transfer training, and Range of Motion.    Occupational Therapy to evaluate and treat. Evaluate home environment for safety and equipment needs. Perform/Instruct on transfers, ADL training, ROM, and therapeutic exercises.      Send initial Home Health orders to hospitals attending physician on call.  Send follow up questions to VAD clinic MD [phone] or [phone].

## 2024-05-31 NOTE — HOSPITAL COURSE
63 yo HM 3 on Bumex 4mg BID and currently on 6 week therapy for Enterococcus bacteremia with Ampicillin and Rocephin admitted after presenting to local ED with 2 falls.  CT of head at OSH unremarkable and he was sent for neuro workup.  Neuro reporting it wasn't a stroke and attributed his symptoms to neuropathy that's causing foot drop.  He was placed in a brace and his gait improved.     Nephrology consulted during hospital stay due to elevated renal function.  They feel he is close to being a dialysis patient.  He diuresed on IV Bumex with prn Metolazone.  He is net negative 1.2L throughout his hospital stay and weight on day of discharge was 179lbs down from 186lbs on admit.  We continued his home dose of Bumex 4mg BID but increased his prn Metolazone to 10mg.  He received a dose of Epo while inpatient and he has plans to follow up with Heme/Onc to get outpatient therapy arranged.      During his hospital stay; he completed his 6 weeks of Ampicillin and Rocephin so tunneled line was removed and he was transtioned to Amoxicillin per ID recommendations.     He was discharged in stable condition with home health PT/OT and scheduled follow up with Heme/Onc, Nephrology and VAD clinic.

## 2024-05-31 NOTE — ASSESSMENT & PLAN NOTE
-On 6 week course of ampicillin and Rocephin till 5/30/24  -WBC normal   -Changed to Amoxicillin for discharge   -Tunneled line removed 5/30

## 2024-05-31 NOTE — ASSESSMENT & PLAN NOTE
-ICM  -Last 2D Echo  4/18/24 : LVEF 10-15%, LVEDD  6.1 cm  -Diuresed with IV Bumex 2mg IV BID and prn Metolazone with good response.  Will transition to previous home dose of Diuretics   -home bumex 4mg bid.   -GDMT with Hydralazine  -2g Na dietary restriction, 1500 mL fluid restriction, strict I/Os

## 2024-05-31 NOTE — PLAN OF CARE
Nephrology Chart Review    Intake/Output Summary (Last 24 hours) at 5/31/2024 1725  Last data filed at 5/31/2024 0800  Gross per 24 hour   Intake 360 ml   Output 400 ml   Net -40 ml       Vitals:    05/31/24 1000 05/31/24 1122 05/31/24 1226 05/31/24 1247   BP:    (!) 82/0   BP Location:    Right arm   Patient Position:    Lying   Pulse: 87 81 82 82   Resp:   20    Temp:   97.7 °F (36.5 °C)    TempSrc:   Oral    SpO2:   96%    Weight:       Height:           Recent Labs   Lab 05/29/24  0530 05/30/24  0531 05/31/24  0347    140 138   K 4.1 3.8 3.8    99 98   CO2 26 29 25   BUN 67* 61* 59*   CREATININE 6.4* 6.1* 6.2*   CALCIUM 9.2 9.6 9.3   PHOS 4.1 4.5 4.7*     A1c - 04/17/2024 7.4     Patients' chart and laboratory studies reviewed. Completed antibiotics so tunneled catheter removed. Renal function largely stable with ~875 mL documented UOP (on Bumex 2 mg IV BID and metolazone 10 mg PO daily) and maintaining saturations of +97% on room air. Being discharged on Bumex 4 mg PO BID and metolazone 10 mg PO PRN for weight gain today. To follow-up with nephrology outpatient for HD planning given advanced CKD (has already completed ESRD modality treatment choices). No other related issues identified. Please call nephrology as needed. We will continue to follow if remains inpatient.     Henok Ayon MD  Nephrology

## 2024-05-31 NOTE — PLAN OF CARE
Problem: Adult Inpatient Plan of Care  Goal: Plan of Care Review  Outcome: Progressing  Goal: Patient-Specific Goal (Individualized)  Outcome: Progressing  Goal: Absence of Hospital-Acquired Illness or Injury  Outcome: Progressing  Goal: Optimal Comfort and Wellbeing  Outcome: Progressing  Goal: Readiness for Transition of Care  Outcome: Progressing     Problem: Infection  Goal: Absence of Infection Signs and Symptoms  Outcome: Progressing     Problem: Ventricular Assist Device  Goal: Optimal Adjustment to Device  Outcome: Progressing  Goal: Absence of Bleeding  Outcome: Progressing  Goal: Absence of Embolism Signs and Symptoms  Outcome: Progressing  Goal: Optimal Blood Flow  Outcome: Progressing  Goal: Absence of Infection Signs and Symptoms  Outcome: Progressing  Goal: Effective Right-Sided Heart Function  Outcome: Progressing

## 2024-05-31 NOTE — SUBJECTIVE & OBJECTIVE
Past Medical History:   Diagnosis Date    CAD (coronary artery disease)     CHF (congestive heart failure)     Diabetes mellitus     HFrEF (heart failure with reduced ejection fraction)     ICD (implantable cardioverter-defibrillator) in place     MI, old        Past Surgical History:   Procedure Laterality Date    ANGIOPLASTY-VENOUS ARTERY Right 12/1/2023    Procedure: ANGIOPLASTY-VENOUS ARTERY, RIGHT FEMORAL;  Surgeon: Yuri Washington MD;  [facility];  Service: Cardiovascular;  Laterality: Right;    AORTIC VALVULOPLASTY N/A 12/1/2023    Procedure: REPAIR, AORTIC VALVE;  Surgeon: Yuri Washington MD;  [facility];  Service: Cardiovascular;  Laterality: N/A;    CARDIAC SURGERY      CLOSURE N/A 12/1/2023    Procedure: CLOSURE, TEMPORARY;  Surgeon: Yuri Washington MD;  [facility];  Service: Cardiovascular;  Laterality: N/A;    DRAINAGE OF PLEURAL EFFUSION  12/4/2023    Procedure: DRAINAGE, PLEURAL EFFUSION;  Surgeon: Yuri Washington MD;  [facility];  Service: Cardiovascular;;    INSERTION OF GRAFT TO PERICARDIUM  12/4/2023    Procedure: INSERTION, GRAFT, PERICARDIUM;  Surgeon: Yuri Washington MD;  Location: [facility] OR [address] [place] [address];  Service: Cardiovascular;;    INSERTION OF INTRA-AORTIC BALLOON ASSIST DEVICE Right 11/21/2023    Procedure: INSERTION, INTRA-AORTIC BALLOON PUMP;  Surgeon: Finn Cohn MD;  Location: [facility] CATH LAB;  Service: Cardiology;  Laterality: Right;    LEFT VENTRICULAR ASSIST DEVICE Left 12/1/2023    Procedure: INSERTION-LEFT VENTRICULAR ASSIST DEVICE;  Surgeon: Yuri Washington MD;  Location: [facility] OR [address] [place] [address];  Service: Cardiovascular;  Laterality: Left;  REDO STERNOTOMY - REDO SAW NEEDED FOR CASE    LYSIS OF ADHESIONS  12/1/2023    Procedure: LYSIS, ADHESIONS;  Surgeon: Yuri Washington MD;  Location: [facility] OR [address] [place] [address];  Service: Cardiovascular;;    PLACEMENT OF SWAN ROLANDO CATHETER WITH IMAGING GUIDANCE  11/20/2023    Procedure: INSERTION,  CATHETER, SWAN-ROLANDO, WITH IMAGING GUIDANCE;  Surgeon: Sajan Hurley MD;  Location: [facility] CATH LAB;  Service: Cardiology;;    REMOVAL OF TUNNELED CENTRAL VENOUS CATHETER (CVC) N/A 3/1/2024    Procedure: REMOVAL, CATHETER, CENTRAL VENOUS, TUNNELED;  Surgeon: Seble Aguilar MD;  Location: [facility] CATH LAB;  Service: Interventional Nephrology;  Laterality: N/A;    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 12/1/2023    Procedure: REPAIR, ANEURYSM, ARTERY, FEMORAL;  Surgeon: Yuri Washington MD;  [facility];  Service: Cardiovascular;  Laterality: Right;  Right Femoral Artery Repair    RIGHT HEART CATHETERIZATION Right 10/10/2023    Procedure: INSERTION, CATHETER, RIGHT HEART;  Surgeon: Bin Gandhi MD;  Location: [facility] CATH LAB;  Service: Cardiology;  Laterality: Right;    RIGHT HEART CATHETERIZATION Right 10/13/2023    Procedure: INSERTION, CATHETER, RIGHT HEART;  Surgeon: Walter Mcintyre MD;  Location: [facility] CATH LAB;  Service: Cardiology;  Laterality: Right;    RIGHT HEART CATHETERIZATION  11/13/2023    RIGHT HEART CATHETERIZATION Right 11/13/2023    Procedure: INSERTION, CATHETER, RIGHT HEART;  Surgeon: Juventino Bermudez Jr., MD;  Location: [facility] CATH LAB;  Service: Cardiology;  Laterality: Right;    RIGHT HEART CATHETERIZATION Right 11/20/2023    Procedure: INSERTION, CATHETER, RIGHT HEART;  Surgeon: Sajan Hurley MD;  Location: [facility] CATH LAB;  Service: Cardiology;  Laterality: Right;    RIGHT HEART CATHETERIZATION Right 1/22/2024    Procedure: INSERTION, CATHETER, RIGHT HEART;  Surgeon: Brayan Ocampo MD;  Location: [facility] CATH LAB;  Service: Cardiology;  Laterality: Right;    STERNAL WOUND CLOSURE N/A 12/4/2023    Procedure: CLOSURE, WOUND, STERNUM;  Surgeon: Yuri Washington MD;  [facility];  Service: Cardiovascular;  Laterality: N/A;    STERNOTOMY N/A 12/1/2023    Procedure: STERNOTOMY, REDO;  Surgeon: Yuri Washington MD;  [facility];  Service:  Cardiovascular;  Laterality: N/A;    VALVULOPLASTY, MITRAL VALVE N/A 12/1/2023    Procedure: VALVULOPLASTY, MITRAL VALVE;  Surgeon: Yuri Washington MD;  Location: [facility] OR [address] [place] [address];  Service: Cardiovascular;  Laterality: N/A;       Review of patient's allergies indicates:  No Known Allergies    Medications:  Medications Prior to Admission   Medication Sig    acetaminophen (TYLENOL) 500 MG tablet Take 2 tablets (1,000 mg total) by mouth every 8 (eight) hours as needed for Pain.    amiodarone (PACERONE) 400 MG tablet Take 1 tablet (400 mg total) by mouth once daily.    bumetanide (BUMEX) 2 MG tablet Take 2 tablets (4 mg total) by mouth 2 (two) times a day.    famotidine (PEPCID) 20 MG tablet Take 1 tablet (20 mg total) by mouth once daily.    fluticasone propionate (FLONASE) 50 mcg/actuation nasal spray 2 sprays (100 mcg total) by Each Nostril route once daily.    gabapentin (NEURONTIN) 100 MG capsule Take 1 capsule (100 mg total) by mouth 2 (two) times daily.    hydrALAZINE (APRESOLINE) 100 MG tablet Take 1 tablet (100 mg total) by mouth every 8 (eight) hours.    magnesium oxide (MAG-OX) 400 mg (241.3 mg magnesium) tablet Take 1 tablet (400 mg total) by mouth once daily.    omega-3 acid ethyl esters (LOVAZA) 1 gram capsule Take 2 capsules (2 g total) by mouth 2 (two) times daily.    venlafaxine (EFFEXOR) 37.5 MG Tab Take 1 tablet (37.5 mg total) by mouth once daily.    vitamin D (VITAMIN D3) 1000 units Tab Take 1 tablet (1,000 Units total) by mouth once daily.    [DISCONTINUED] dextrose 5 % in water (D5W) PgBk 100 mL with cefTRIAXone 2 gram SolR 2 g Inject 2 g into the vein every 12 (twelve) hours.    [DISCONTINUED] metOLazone (ZAROXOLYN) 5 MG tablet Take 1 tablet (5 mg total) by mouth once weekly as needed on Wednesdays only if >5lb weight gain in 1 week    [DISCONTINUED] sodium bicarbonate 325 MG tablet Take 1 tablet (325 mg total) by mouth 3 (three) times daily.    [DISCONTINUED] sodium chloride 0.9 % PgBk 100 mL  with ampicillin 2 gram SolR 2 g Inject 2 g into the vein every 12 (twelve) hours.    [DISCONTINUED] warfarin (COUMADIN) 2.5 MG tablet Take 3.75mg orally daily on Tuesday and Thursdays. Take 2.5mg orally daily on all other days.    pulse oximeter (PULSE OXIMETER) device by Apply Externally route 2 (two) times a day. Use twice daily at 8 AM and 3 PM and record the value in MyChart as directed.    senna-docusate 8.6-50 mg (PERICOLACE) 8.6-50 mg per tablet Take 2 tablets by mouth daily as needed for Constipation.    traZODone (DESYREL) 50 MG tablet Take 0.5 tablets (25 mg total) by mouth every evening.     Antibiotics (From admission, onward)      Start     Stop Route Frequency Ordered    05/30/24 2100  amoxicillin capsule 500 mg         -- Oral Every 12 hours 05/30/24 1346          Antifungals (From admission, onward)      None          Antivirals (From admission, onward)      None             Immunization History   Administered Date(s) Administered    COVID-19 MRNA, LN-S PF (MODERNA HALF 0.25 ML DOSE) 12/13/2021    COVID-19, MRNA, LN-S, PF (MODERNA FULL 0.5 ML DOSE) 03/18/2021, 04/15/2021    DTaP 06/27/2012    Hepatitis A, Adult 11/17/2023    Hepatitis B (recombinant) Adjuvanted, 2 dose 09/03/2023, 11/17/2023    Influenza - Quadrivalent 11/09/2021    PPD Test 12/22/2023    Pneumococcal Conjugate - 20 Valent 09/03/2023    RSVpreF (Arexvy) 09/03/2023    Tdap 06/27/2012, 09/03/2023    Zoster Recombinant 09/03/2023       Family History    None       Social History     Socioeconomic History    Marital status:    Tobacco Use    Smoking status: Former     Current packs/day: 0.50     Types: Cigarettes   Substance and Sexual Activity    Alcohol use: Yes     Comment: rarely    Drug use: No    Sexual activity: Not Currently     Partners: Female     Social Determinants of Health     Financial Resource Strain: High Risk (3/26/2024)    Overall Financial Resource Strain (CARDIA)     Difficulty of Paying Living Expenses: Hard    Food Insecurity: Food Insecurity Present (3/26/2024)    Hunger Vital Sign     Worried About Running Out of Food in the Last Year: Sometimes true     Ran Out of Food in the Last Year: Often true   Transportation Needs: No Transportation Needs (3/26/2024)    PRAPARE - Transportation     Lack of Transportation (Medical): No     Lack of Transportation (Non-Medical): No   Physical Activity: Inactive (2/29/2024)    Exercise Vital Sign     Days of Exercise per Week: 2 days     Minutes of Exercise per Session: 0 min   Stress: Stress Concern Present (3/26/2024)    [facility] - Occupational Stress Questionnaire     Feeling of Stress : To some extent   Housing Stability: High Risk (3/26/2024)    Housing Stability Vital Sign     Unable to Pay for Housing in the Last Year: Yes     Number of Places Lived in the Last Year: 1     Unstable Housing in the Last Year: No     Review of Systems   Constitutional:  Positive for fatigue. Negative for fever.   Musculoskeletal:  Positive for joint swelling.     Objective:     Vital Signs (Most Recent):  Temp: 97.7 °F (36.5 °C) (05/31/24 1226)  Pulse: 82 (05/31/24 1247)  Resp: 20 (05/31/24 1226)  BP: (!) 82/0 (05/31/24 1247)  SpO2: 96 % (05/31/24 1226) Vital Signs (24h Range):  Temp:  [97.3 °F (36.3 °C)-98.3 °F (36.8 °C)] 97.7 °F (36.5 °C)  Pulse:  [54-91] 82  Resp:  [16-23] 20  SpO2:  [95 %-99 %] 96 %  BP: ()/(0-90) 82/0     Weight: 81.2 kg (179 lb 0.2 oz)  Body mass index is 22.98 kg/m².    Estimated Creatinine Clearance: 14.2 mL/min (A) (based on SCr of 6.2 mg/dL (H)).     Physical Exam  Vitals and nursing note reviewed.   Constitutional:       General: He is not in acute distress.     Appearance: Normal appearance.   HENT:      Mouth/Throat:      Mouth: Mucous membranes are moist.      Pharynx: No oropharyngeal exudate.   Pulmonary:      Effort: Pulmonary effort is normal. No respiratory distress.   Abdominal:      Palpations: Abdomen is soft.       Tenderness: There is no abdominal tenderness.   Neurological:      General: No focal deficit present.   Psychiatric:         Mood and Affect: Mood normal.         Behavior: Behavior normal.          Significant Labs:   Microbiology Results (last 7 days)       Procedure Component Value Units Date/Time    Blood culture [[id number]] Collected: 05/29/24 2102    Order Status: Completed Specimen: Blood from Peripheral, Upper Arm, Right Updated: 05/30/24 2213     Blood Culture, Routine No Growth to date      No Growth to date    Blood culture [[id number]] Collected: 05/29/24 2050    Order Status: Completed Specimen: Blood from Peripheral, Antecubital, Left Updated: 05/30/24 2213     Blood Culture, Routine No Growth to date      No Growth to date    Blood culture [[id number]]     Order Status: Canceled Specimen: Blood     Blood culture [[id number]]     Order Status: Canceled Specimen: Blood             Significant Imaging: I have reviewed all pertinent imaging results/findings within the past 24 hours.

## 2024-05-31 NOTE — PROGRESS NOTES
DISCHARGE NOTE:    Radha Abbott is a 64 y.o. male s/p HM3 lvad from 12.1.2013 admitted for evaluation of right foot numbness/dragging and fall at home.     Past Medical History:   Diagnosis Date    CAD (coronary artery disease)     CHF (congestive heart failure)     Diabetes mellitus     HFrEF (heart failure with reduced ejection fraction)     ICD (implantable cardioverter-defibrillator) in place     MI, old        Hospital Course: During his hospital team Mr. Abbott was evaluated by the vascular neurology team. They started work up for neuropathy and would like to continue follow up outpatient. They started atorvastatin and folic acid.     During his admission Mr. Abbott also completed his course of iv antibiotics for e. Faecalis bacteremia. He was transitioned to amoxicillin suppression. He also received a dose of epogen 10,000 units IV x 1 on 5.30.      His inr today is 2.8. He was continued on his previous home warfarin regimen of 2.5mg orally daily.     Pharmacy Interventions/Recommendations:  1) INR Goal: 2-3    2) Antiplatelet Agents: ASA 81mg daily     3) Heparin Bridging:  UFH     4) INR Follow-Up/Discharge Needs:  Monday with coumadin clinic     See list of discharge medication for dosing instructions.     Radha Abbott and his caregiver verbalized their understanding and had the opportunity to ask questions.      Discharge Medications:     Medication List        START taking these medications      amoxicillin 500 MG capsule  Commonly known as: AMOXIL  Take 1 capsule (500 mg total) by mouth every 12 (twelve) hours.     atorvastatin 40 MG tablet  Commonly known as: LIPITOR  Take 1 tablet (40 mg total) by mouth every evening.     folic acid 1 MG tablet  Commonly known as: FOLVITE  Take 1 tablet (1 mg total) by mouth once daily.  Start taking on: June 1, 2024            CHANGE how you take these medications      metOLazone 10 MG tablet  Commonly known as: ZAROXOLYN  Take 1 tablet (10 mg total) by mouth as needed  (Take once weekly on Wednesdays only if >5lb weight gain in 1 week).  What changed:   medication strength  how much to take     warfarin 2.5 MG tablet  Commonly known as: COUMADIN  Take 1 tablet (2.5 mg total) by mouth Daily.  What changed:   how much to take  how to take this  when to take this  additional instructions            CONTINUE taking these medications      acetaminophen 500 MG tablet  Commonly known as: TYLENOL  Take 2 tablets (1,000 mg total) by mouth every 8 (eight) hours as needed for Pain.     amiodarone 400 MG tablet  Commonly known as: PACERONE  Take 1 tablet (400 mg total) by mouth once daily.     bumetanide 2 MG tablet  Commonly known as: BUMEX  Take 2 tablets (4 mg total) by mouth 2 (two) times a day.     famotidine 20 MG tablet  Commonly known as: PEPCID  Take 1 tablet (20 mg total) by mouth once daily.     fluticasone propionate 50 mcg/actuation nasal spray  Commonly known as: FLONASE  2 sprays (100 mcg total) by Each Nostril route once daily.     gabapentin 100 MG capsule  Commonly known as: NEURONTIN  Take 1 capsule (100 mg total) by mouth 2 (two) times daily.     hydrALAZINE 100 MG tablet  Commonly known as: APRESOLINE  Take 1 tablet (100 mg total) by mouth every 8 (eight) hours.     magnesium oxide 400 mg (241.3 mg magnesium) tablet  Commonly known as: MAG-OX  Take 1 tablet (400 mg total) by mouth once daily.     omega-3 acid ethyl esters 1 gram capsule  Commonly known as: LOVAZA  Take 2 capsules (2 g total) by mouth 2 (two) times daily.     pulse oximeter device  Commonly known as: pulse oximeter  by Apply Externally route 2 (two) times a day. Use twice daily at 8 AM and 3 PM and record the value in [place] as directed.     senna-docusate 8.6-50 mg 8.6-50 mg per tablet  Commonly known as: PERICOLACE  Take 2 tablets by mouth daily as needed for Constipation.     traZODone 50 MG tablet  Commonly known as: DESYREL  Take 0.5 tablets (25 mg total) by mouth every evening.     venlafaxine 37.5  MG Tab  Commonly known as: EFFEXOR  Take 1 tablet (37.5 mg total) by mouth once daily.     vitamin D 1000 units Tab  Commonly known as: VITAMIN D3  Take 1 tablet (1,000 Units total) by mouth once daily.            STOP taking these medications      dextrose 5 % in water (D5W) PgBk 100 mL with cefTRIAXone 2 gram SolR 2 g     sodium bicarbonate 325 MG tablet     sodium chloride 0.9 % PgBk 100 mL with ampicillin 2 gram SolR 2 g               Where to Get Your Medications        These medications were sent to [id number] - [place] - [address]  [address] [address]      Phone: [phone]   amoxicillin 500 MG capsule  atorvastatin 40 MG tablet  folic acid 1 MG tablet  metOLazone 10 MG tablet       Information about where to get these medications is not yet available    Ask your nurse or doctor about these medications  warfarin 2.5 MG tablet

## 2024-05-31 NOTE — HPI
Radha Abbott is a 64 year old man with a history of LVAD (12/1/23) and AICD who was originally admitted on 4/16 with E faecalis bacteremia presents for hospital follow-up.  Source of bacteremia remained unclear.  Per patient's wife, his drive line exit site looked clean.   TTE was negative for vegetation.  However TTE does not rule out valvular or lead vegetation, so decision was made to treat for presumptive endocarditis for 6 weeks with ceftriaxone and ampicillin- EOT 5/30/24. Seen in ID clinic 5/10 and 5/23. Was tolerating treatment. He was admitted on 5/28 for after two falls with foot drop. Also found to be volume overloaded with an IVÁN. ID was consulted for end of care for antibiotics. He is pending discharge today.    He feels well, completed antibiotics at home without any issue. His ceftriaxone/ampicillin ended on 5/30 and transitioned to PO amoxicillin. Blood cultures were drawn on admit and no growth. His line has been removed. No issues with DLES.

## 2024-05-31 NOTE — PROGRESS NOTES
05/31/2024  Jonathanyoshi Ho Jr    Current provider:  Sajan Hurley, *    Device interrogation:      5/31/2024     8:03 AM 5/31/2024     5:00 AM 5/31/2024     1:01 AM 5/30/2024     7:47 PM 5/30/2024     4:33 PM 5/30/2024    12:00 PM 5/30/2024     8:30 AM   TXP LVAD INTERROGATIONS   Type HeartMate3 HeartMate3 HeartMate3 HeartMate3 HeartMate3 HeartMate3    Flow 3.9 3.9 3.8 3.8 4 3.8 3.6   Speed 5050 5050 5050 5050 5000 5000 5000   PI 4.5 4.6 5.7 6.3 5 6.4 5.9   Power ([place]) 3.5 3.3 3.4 3.4 3.5 3.6 3.6   LSL 4600 4600 4600 4600 4600     Pulsatility Pulse Intermittent pulse Intermittent pulse  Intermittent pulse            Rounded on [facility] to ensure all mechanical assist device settings (IABP or VAD) were appropriate and all parameters were within limits.  I was able to ensure all back up equipment was present, the staff had no issues, and the Perfusion Department daily rounding was complete.      For implantable VADs: Interrogation of Ventricular assist device was performed with analysis of device parameters and review of device function. I have personally reviewed the interrogation findings and agree with findings as stated.     In emergency, the nursing units have been notified to contact the perfusion department either by:  Calling [phone] from 630am to 4pm Mon thru Fri, utilizing the On-Call Finder functionality of Epic and searching for Perfusion, or by contacting the hospital  from 4pm to 630am and on weekends and asking to speak with the perfusionist on call.    9:24 AM

## 2024-05-31 NOTE — PROGRESS NOTES
[facility] - Cardiology Stepdown  Heart Transplant  Progress Note    Patient Name: Radha Abbott  MRN: [id number]  Admission Date: 5/28/2024  Hospital Length of Stay: 3 days  Attending Physician: Sajan Hurley, *  Primary Care Provider: Vasu Kong MD  Principal Problem:Weakness of foot, right    Subjective:   Interval History: No acute events overnight. Creatinine remains high but stable. Received Epo shots yesterday and tunneled line removed. Responded well to IV Bumex and looks more euvolemic today.  Net negative 1.2L since admission.  Weight is 179lbs today down from 186lbs on admission.  Patient is eager to go home so will discharge him today with Nephrology follow up as well as Heme/Onc to arrange outpatient Epo injections. Patient reports gait is much better with assistance of foot brace     Scheduled Meds:   amiodarone  400 mg Oral Daily    amoxicillin  500 mg Oral Q12H    atorvastatin  40 mg Oral QHS    bumetanide  4 mg Oral BID loop    famotidine  20 mg Oral Daily    fluticasone propionate  2 spray Each Nostril Daily    folic acid  1 mg Oral Daily    gabapentin  100 mg Oral BID    hydrALAZINE  100 mg Oral Q8H    magnesium oxide  400 mg Oral Daily    metOLazone  10 mg Oral Daily    omega-3 acid ethyl esters  2 g Oral BID    traZODone  25 mg Oral QHS    venlafaxine  37.5 mg Oral Daily    vitamin D  1,000 Units Oral Daily    warfarin  2.5 mg Oral Daily     PRN Meds:  Current Facility-Administered Medications:     acetaminophen, 650 mg, Oral, Q6H PRN    senna-docusate 8.6-50 mg, 2 tablet, Oral, Daily PRN    Review of patient's allergies indicates:  No Known Allergies  Objective:     Vital Signs (Most Recent):  Temp: 97.6 °F (36.4 °C) (05/31/24 0757)  Pulse: 81 (05/31/24 1122)  Resp: 20 (05/31/24 0757)  BP: (!) 102/0 (05/31/24 0803)  SpO2: 99 % (05/31/24 0757) Vital Signs (24h Range):  Temp:  [97.3 °F (36.3 °C)-98.3 °F (36.8 °C)] 97.6 °F (36.4 °C)  Pulse:  [54-91] 81  Resp:  [16-23] 20  SpO2:  [95  %-99 %] 99 %  BP: ()/(0-90) 102/0     Patient Vitals for the past 72 hrs (Last 3 readings):   Weight   05/31/24 0500 81.2 kg (179 lb 0.2 oz)   05/30/24 1215 83.7 kg (184 lb 8.4 oz)   05/30/24 0828 83.7 kg (184 lb 8.4 oz)     Body mass index is 22.98 kg/m².      Intake/Output Summary (Last 24 hours) at 5/31/2024 1215  Last data filed at 5/31/2024 0800  Gross per 24 hour   Intake 1106.18 ml   Output 875 ml   Net 231.18 ml       Hemodynamic Parameters:       Telemetry: NSR        Physical Exam  Constitutional:       Appearance: Normal appearance.   HENT:      Head: Normocephalic and atraumatic.   Neck:      Comments: JVD present   Cardiovascular:      Rate and Rhythm: Normal rate and regular rhythm.      Comments: Smooth VAD hum   Pulmonary:      Effort: Pulmonary effort is normal.      Breath sounds: Normal breath sounds.   Abdominal:      General: Bowel sounds are normal.      Palpations: Abdomen is soft.   Musculoskeletal:      Cervical back: Normal range of motion and neck supple.      Right lower leg: Edema present.      Left lower leg: Edema present.      Comments: R foot weakness/some numbness    Neurological:      General: No focal deficit present.      Mental Status: He is alert and oriented to person, place, and time.      Motor: Weakness present.      Comments: R foot weakness             Significant Labs:  CBC:  Recent Labs   Lab 05/29/24  0530 05/30/24  0531 05/31/24  0347   WBC 4.99 5.23 5.13   RBC 2.11* 2.18* 2.22*   HGB 6.4* 6.5* 6.5*   HCT 20.0* 20.6* 21.8*    244 263   MCV 95 95 98   MCH 30.3 29.8 29.3   MCHC 32.0 31.6* 29.8*     BNP:  Recent Labs   Lab 05/29/24  0530 05/31/24  0347   BNP 1,429* 1,131*     CMP:  Recent Labs   Lab 05/27/24  1636 05/28/24  0559 05/29/24  0530 05/30/24  0531 05/31/24  0347   *   < > 106 114* 73   CALCIUM 9.1   < > 9.2 9.6 9.3   ALBUMIN 2.8*  --  2.6*  --  2.5*   PROT 8.1  --  7.3  --  7.2      < > 141 140 138   K 3.1*   < > 4.1 3.8 3.8   CO2 29    < > 26 29 25   CL 97   < > 101 99 98   BUN 66*   < > 67* 61* 59*   CREATININE 6.6*   < > 6.4* 6.1* 6.2*   ALKPHOS 112  --  113  --  108   ALT 30  --  29  --  36   AST 27  --  26  --  35   BILITOT 0.2  --  0.2  --  0.2    < > = values in this interval not displayed.      Coagulation:   Recent Labs   Lab 05/29/24 0530 05/30/24 0531 05/31/24 0347   INR 2.5* 2.6* 2.8*   APTT 45.4* 47.1* 50.0*     LDH:  Recent Labs   Lab 05/29/24 0530 05/30/24 0531 05/31/24  0347   * 367* 384*     Microbiology:  Microbiology Results (last 7 days)       Procedure Component Value Units Date/Time    Blood culture [[id number]] Collected: 05/29/24 2102    Order Status: Completed Specimen: Blood from Peripheral, Upper Arm, Right Updated: 05/30/24 2213     Blood Culture, Routine No Growth to date      No Growth to date    Blood culture [[id number]] Collected: 05/29/24 2050    Order Status: Completed Specimen: Blood from Peripheral, Antecubital, Left Updated: 05/30/24 2213     Blood Culture, Routine No Growth to date      No Growth to date    Blood culture [[id number]]     Order Status: Canceled Specimen: Blood     Blood culture [[id number]]     Order Status: Canceled Specimen: Blood             I have reviewed all pertinent labs within the past 24 hours.    Estimated Creatinine Clearance: 14.2 mL/min (A) (based on SCr of 6.2 mg/dL (H)).    Diagnostic Results:  I have reviewed all pertinent imaging results/findings within the past 24 hours.  Assessment and Plan:     Mr. Radha Abbott is a very pleasant 63 yo male with stage D HFrEF, ICMP with previous admission for ADHF/CS. He did undergo HM3 placement placed by Dr Washington on 12/1/23. Lengthy post op course complicated by vasoplegia (requiring Giaprezza), debility, malnutrition/impaired swallowing, and renal failure requiring HD(since weaned off). Once medically stable, he was transferred to Rehab for further PT/OT/ST. Of note, pt left with permacath still in place. He has done really  well in rehab and now has continued to progress well at home. He is on a very high dose of Bumex 4 mg twice daily. VAD speed is at 5000 rpm. No VAD alarms noted on interrogation occasional PI events . He is currenlty on 6 week therapy for Enterococcus bacteremia with ampicillin and rocephin ending 5/30/24. He presented to the local ED with two falls. He says he hasn't been able to move his R foot well for two days and also can't feel well in it. He has been dragging the R foot when walking which led to mechanical falls and then he went to the ED. He also says weight went up by 2 pounds from 181 to 181 lbs and he got leg edema. No CP, SOB or LH reported. No other symptoms. CT head in OSH is unremarkable so is xray foot. He was sent for neuro work up. His Cr is elevated as well.     * Weakness of foot, right  -R foot motor and sensory weakness x 2 days  -2 mechanical falls  -CT head and xray foot unremarkable  -Vascular neurology consulted-they suspect R common peroneal neuropathy. Unable to obtain MRI due to VAD   -Neuropathy labs pending   -PT/OT consult and brace placed on foot with good response     LVAD (left ventricular assist device) present  -HeartMate 3 Implanted  12/1/2023  as DT  -Cont Coumadin, dosing by PharmD.  Goal INR 2.0-3.0 . therapeutic today.   -Antiplatelets Not on ASA due to low H/H on implant   -LDH is stable overall today. Will continue to monitor daily.  -Speed set at  5000     -Interrogation notable for no events  -ECHO 4/18/24 with speed at 5000 EF: 10-15%, LVEDD: 6.1cm, RV size normal function mod reduced   -RHC 1/22/24 with RA 13, PCWP 22, PA 50/25, CO 5.8, CI 3.0   -Not listed for OHTx, declined for OHTX due to comorbidities       Procedure: Device Interrogation Including analysis of device parameters  Current Settings: Ventricular Assist Device  Review of device function is stable  VAD interrogation with no notable events           5/31/2024     8:03 AM 5/31/2024     5:00 AM 5/31/2024      1:01 AM 5/30/2024     7:47 PM 5/30/2024     4:33 PM 5/30/2024    12:00 PM 5/30/2024     8:30 AM   TXP LVAD INTERROGATIONS   Type HeartMate3 HeartMate3 HeartMate3 HeartMate3 HeartMate3 HeartMate3    Flow 3.9 3.9 3.8 3.8 4 3.8 3.6   Speed 5050 5050 5050 5050 5000 5000 5000   PI 4.5 4.6 5.7 6.3 5 6.4 5.9   Power (Carrington) 3.5 3.3 3.4 3.4 3.5 3.6 3.6   LSL 4600 4600 4600 4600 4600     Pulsatility Pulse Intermittent pulse Intermittent pulse  Intermittent pulse           Bacteremia  -On 6 week course of ampicillin and Rocephin till 5/30/24  -WBC normal   -Changed to Amoxicillin for discharge   -Tunneled line removed 5/30    Acute on chronic combined systolic and diastolic heart failure  -ICM  -Last 2D Echo  4/18/24 : LVEF 10-15%, LVEDD  6.1 cm  -Diuresed with IV Bumex 2mg IV BID and prn Metolazone with good response.  Will transition to previous home dose of Diuretics   -home bumex 4mg bid.   -GDMT with Hydralazine  -2g Na dietary restriction, 1500 mL fluid restriction, strict I/Os    Acute renal failure superimposed on stage 4 chronic kidney disease  -SrCr 6.4 on admission  -Baseline ~4 as of recent   -Nephrology following and will arrange for outpatient follow up with Nephrology as well as Heme Onc for Epo injections   -Trend BMPs   -Diuresis as above     Ventricular tachycardia  -Cont home amio     Anticoagulant long-term use  -Had epistaxis 2 days ago but has resolved  -INR at 2.8      CAD (coronary artery disease)  -Continue Lipitor      Anemia  -possibly secondary to dilutional anemia in the setting of hypervolemia.   -Also in the setting of renal failure  - No  overt signs of bleeding   -Received Epo yesterday and will arrange for outpatient administration   -Iron studies done and not candidate for IV iron  -Trend CBCs  -Will follow up with nephrology as OP        DENIZ Faye  Heart Transplant  [facility] - Cardiology Stepdown

## 2024-06-03 ENCOUNTER — ANTI-COAG VISIT (OUTPATIENT)
Dept: CARDIOLOGY | Facility: CLINIC | Age: 64
End: 2024-06-03
Payer: MEDICAID

## 2024-06-03 DIAGNOSIS — Z95.811 LVAD (LEFT VENTRICULAR ASSIST DEVICE) PRESENT: Primary | ICD-10-CM

## 2024-06-03 LAB
BACTERIA BLD CULT: NORMAL
BACTERIA BLD CULT: NORMAL
PYRIDOXAL SERPL-MCNC: 4 UG/L (ref 5–50)
VIT B1 BLD-MCNC: 46 UG/L (ref 38–122)

## 2024-06-03 NOTE — PROGRESS NOTES
Pt wife reports correct dosing per calendar. STAT INR orders sent for 6/3/24 to [facility].

## 2024-06-03 NOTE — PROGRESS NOTES
Admitted 5/28-5/31. Hospital course: Radha Abbott is a 64 y.o. male s/p HM3 lvad from 12.1.2013 admitted for evaluation of right foot numbness/dragging and fall at home. During his hospital team Mr. Abbott was evaluated by the vascular neurology team. They started work up for neuropathy and would like to continue follow up outpatient. They started atorvastatin and folic acid. During his admission Mr. Abbott also completed his course of iv antibiotics for e. Faecalis bacteremia. He was transitioned to amoxicillin suppression. He also received a dose of epogen 10,000 units IV x 1 on 5.30.  His inr today is 2.8. He was continued on his previous home warfarin regimen of 2.5mg orally daily.

## 2024-06-04 ENCOUNTER — ANTI-COAG VISIT (OUTPATIENT)
Dept: CARDIOLOGY | Facility: CLINIC | Age: 64
End: 2024-06-04
Payer: MEDICAID

## 2024-06-04 DIAGNOSIS — Z95.811 LVAD (LEFT VENTRICULAR ASSIST DEVICE) PRESENT: Primary | ICD-10-CM

## 2024-06-04 LAB
INR PPP: 2.7
METHYLMALONATE SERPL-SCNC: 0.58 UMOL/L

## 2024-06-04 PROCEDURE — 93793 ANTICOAG MGMT PT WARFARIN: CPT | Mod: ,,,

## 2024-06-04 NOTE — PROGRESS NOTES
Sana at [facility] stated INR is 2.7 via finger stick, dated for 6/3/2024, stated she has faxed the results twice as we requested and will try to fax results again.

## 2024-06-04 NOTE — PROGRESS NOTES
[facility]    2024 12:20 PM    Assessment/Plan:    Patient presents today with therapeutic INR.    Assessment of patient findings and chart review: INR was drawn via fingerstick - HH will be advised that INR must be drawn via venipuncture    Recommendation for patient's warfarin regimen: Continue current maintenance dose    Recommend repeat INR Thursday  _________________________________________________________________    [facility] (64 y.o.) is followed by the [facility].    Anticoagulation Summary  As of 2024      INR goal:  2.0-3.0   TTR:  48.6% (3 mo)   INR used for dosin.7 (6/3/2024)   Warfarin maintenance plan:  2.5 mg (2.5 mg x 1) every day   Weekly warfarin total:  17.5 mg   Plan last modified:  Jesenia Jensen, PharmD (2024)   Next INR check:  2024   Target end date:      Indications    LVAD (left ventricular assist device) present [Z95.811]                 Anticoagulation Episode Summary       INR check location:      Preferred lab:      Send INR reminders to:  [facility] LVAD    Comments:  [facility]  ( [phone], fax [phone])  // [facility]          Anticoagulation Care Providers       Provider Role Specialty Phone number    Sajan Hurley MD [facility] Cardiology [phone]

## 2024-06-05 ENCOUNTER — TELEPHONE (OUTPATIENT)
Dept: TRANSPLANT | Facility: CLINIC | Age: 64
End: 2024-06-05
Payer: MEDICAID

## 2024-06-05 ENCOUNTER — DOCUMENTATION ONLY (OUTPATIENT)
Dept: TRANSPLANT | Facility: CLINIC | Age: 64
End: 2024-06-05
Payer: MEDICAID

## 2024-06-05 NOTE — PROGRESS NOTES
Weekly lab orders faxed to [facility] [phone]  New reminder created for VAD coordinators   VAD coordinators notified.

## 2024-06-05 NOTE — TELEPHONE ENCOUNTER
Returned call to patient's wife, explained that jardiance can effect kidney function so I am going to route information to provider and pharmacist to review and offer alternatives if possible.

## 2024-06-05 NOTE — TELEPHONE ENCOUNTER
Called wife and communicated recommendation for glipizide. Informed her that if the PCP would like to discuss she can provide them our contact information. Wife verbalized understanding.

## 2024-06-05 NOTE — TELEPHONE ENCOUNTER
----- Message from Walter Mcintyre MD sent at 6/5/2024  1:54 PM CDT -----  Regarding: RE: MEDICATION  Agreed, GFR cutoff for Jardiance is 20 and Farxiga is 30  ----- Message -----  From: Osiris Rodriguez, PharmD  Sent: 6/5/2024   1:51 PM CDT  To: Alva Barry RN; Marilu Ma RN; #  Subject: RE: MEDICATION                                   I'd agree with avoiding. We can use it in GFR down to 20, but his is 9, with Crcl est at 14. Glipizide would be a safer option.     Thanks,   Osiris  ----- Message -----  From: Ashanti Duncan RN  Sent: 6/5/2024   1:36 PM CDT  To: Alva Barry RN; Osiris Rodriguez, PharmD; #  Subject: FW: MEDICATION                                   Hi Osiris and Walter,    This yanni's PCP wanted to start him on Jardiance however wife wanted to verify with us, however my concern is Kidney function, will you review and if you have any suggestions for alternatives the wife asked for those as well.    Thanks,  Oscar  ----- Message -----  From: Yee Hart  Sent: 6/5/2024   1:28 PM CDT  To: [facility]  Subject: MEDICATION                                       Pls call pt's wife Arlyn at [phone].  Pt is having issues with his blood glucose levels being high and his PCP has put him on Jardiance and she wants to know if it is safe for him to take.    Thank you

## 2024-06-06 NOTE — PROGRESS NOTES
Deysi with [facility] states, she was unable to draw patient due to line removal. After 3 attempts patient declined redraw. Please advise

## 2024-06-06 NOTE — PROGRESS NOTES
6/6- Angelina from [facility] stated that a nurse went to draw the pt's INR om 6/6 but was unable to do it through venipuncture and the pt no longer has a pick line. She asked if a fingerstick could be done on 6/7.

## 2024-06-06 NOTE — PROGRESS NOTES
06/06/2024-Angelina advised pt will try to hydrate for repeat INR on 06/07/24 and if it can't be drawn via venipuncture they can draw via fingerstick, verbalized understanding. Angelina also advisedvinr should be drawn early am on 6/7/24 and taken to hospital lab, verbalized understanding

## 2024-06-07 ENCOUNTER — ANTI-COAG VISIT (OUTPATIENT)
Dept: CARDIOLOGY | Facility: CLINIC | Age: 64
End: 2024-06-07
Payer: MEDICAID

## 2024-06-07 DIAGNOSIS — Z95.811 LVAD (LEFT VENTRICULAR ASSIST DEVICE) PRESENT: Primary | ICD-10-CM

## 2024-06-07 LAB — INR PPP: 2.6

## 2024-06-07 PROCEDURE — 93793 ANTICOAG MGMT PT WARFARIN: CPT | Mod: ,,,

## 2024-06-07 NOTE — PROGRESS NOTES
[facility]    2024 1:04 PM    Assessment/Plan:    Patient presents today with therapeutic INR.    Assessment of patient findings and chart review: no significant findings     Recommendation for patient's warfarin regimen: Continue current maintenance dose    Recommend repeat INR Wednesday  _________________________________________________________________    Radha Abbott (64 y.o.) is followed by the [facility].    Anticoagulation Summary  As of 2024      INR goal:  2.0-3.0   TTR:  50.8% (3.1 mo)   INR used for dosin.6 (2024)   Warfarin maintenance plan:  2.5 mg (2.5 mg x 1) every day   Weekly warfarin total:  17.5 mg   Plan last modified:  Jesenia Jensen, PharmD (2024)   Next INR check:  2024   Target end date:      Indications    LVAD (left ventricular assist device) present [Z95.811]                 Anticoagulation Episode Summary       INR check location:      Preferred lab:      Send INR reminders to:  [facility]IN LVAD    Comments:  [facility]  ( [phone], fax [phone])  // [employer]          Anticoagulation Care Providers       Provider Role Specialty Phone number    Sajan Hurley MD [facility] Cardiology [phone]

## 2024-06-07 NOTE — PROGRESS NOTES
6/7- Brianda from [facility] said that she tried to draw the pt's INR this morning and  attempted three times in 6/6 but they are not getting much from him. She said that he may need to go into a lab because no one has been able to get a good draw on him.

## 2024-06-11 ENCOUNTER — HOSPITAL ENCOUNTER (INPATIENT)
Facility: HOSPITAL | Age: 64
LOS: 20 days | Discharge: NURSING, REHAB OR LONG-TERM CARE | DRG: 673 | End: 2024-07-01
Attending: STUDENT IN AN ORGANIZED HEALTH CARE EDUCATION/TRAINING PROGRAM | Admitting: INTERNAL MEDICINE
Payer: MEDICAID

## 2024-06-11 DIAGNOSIS — N18.4 ACUTE RENAL FAILURE SUPERIMPOSED ON STAGE 4 CHRONIC KIDNEY DISEASE: Primary | ICD-10-CM

## 2024-06-11 DIAGNOSIS — J69.0 ASPIRATION PNEUMONIA OF BOTH LUNGS, UNSPECIFIED ASPIRATION PNEUMONIA TYPE, UNSPECIFIED PART OF LUNG: ICD-10-CM

## 2024-06-11 DIAGNOSIS — I48.0 PAF (PAROXYSMAL ATRIAL FIBRILLATION): ICD-10-CM

## 2024-06-11 DIAGNOSIS — N17.8 ACUTE RENAL FAILURE WITH OTHER SPECIFIED PATHOLOGICAL KIDNEY LESION SUPERIMPOSED ON STAGE 5 CHRONIC KIDNEY DISEASE, NOT ON CHRONIC DIALYSIS: ICD-10-CM

## 2024-06-11 DIAGNOSIS — B95.2 BACTEREMIA DUE TO ENTEROCOCCUS: ICD-10-CM

## 2024-06-11 DIAGNOSIS — N18.6 CKD (CHRONIC KIDNEY DISEASE) STAGE V REQUIRING CHRONIC DIALYSIS: ICD-10-CM

## 2024-06-11 DIAGNOSIS — D63.1 ANEMIA DUE TO CHRONIC KIDNEY DISEASE, ON CHRONIC DIALYSIS: ICD-10-CM

## 2024-06-11 DIAGNOSIS — N18.4 ACUTE RENAL FAILURE SUPERIMPOSED ON STAGE 4 CHRONIC KIDNEY DISEASE, UNSPECIFIED ACUTE RENAL FAILURE TYPE: ICD-10-CM

## 2024-06-11 DIAGNOSIS — I50.43 ACUTE ON CHRONIC COMBINED SYSTOLIC (CONGESTIVE) AND DIASTOLIC (CONGESTIVE) HEART FAILURE: ICD-10-CM

## 2024-06-11 DIAGNOSIS — Z95.811 LVAD (LEFT VENTRICULAR ASSIST DEVICE) PRESENT: ICD-10-CM

## 2024-06-11 DIAGNOSIS — N17.9 ACUTE RENAL FAILURE: ICD-10-CM

## 2024-06-11 DIAGNOSIS — N18.5 ACUTE RENAL FAILURE WITH OTHER SPECIFIED PATHOLOGICAL KIDNEY LESION SUPERIMPOSED ON STAGE 5 CHRONIC KIDNEY DISEASE, NOT ON CHRONIC DIALYSIS: ICD-10-CM

## 2024-06-11 DIAGNOSIS — I50.84 END STAGE HEART FAILURE: ICD-10-CM

## 2024-06-11 DIAGNOSIS — Z99.2 DIALYSIS PATIENT: ICD-10-CM

## 2024-06-11 DIAGNOSIS — R53.1 WEAKNESS: ICD-10-CM

## 2024-06-11 DIAGNOSIS — N18.6 ANEMIA DUE TO CHRONIC KIDNEY DISEASE, ON CHRONIC DIALYSIS: ICD-10-CM

## 2024-06-11 DIAGNOSIS — R41.0 DELIRIUM: ICD-10-CM

## 2024-06-11 DIAGNOSIS — D64.9 ANEMIA, UNSPECIFIED TYPE: ICD-10-CM

## 2024-06-11 DIAGNOSIS — I63.9 CVA (CEREBRAL VASCULAR ACCIDENT): ICD-10-CM

## 2024-06-11 DIAGNOSIS — N17.9 ACUTE RENAL FAILURE SUPERIMPOSED ON STAGE 4 CHRONIC KIDNEY DISEASE, UNSPECIFIED ACUTE RENAL FAILURE TYPE: ICD-10-CM

## 2024-06-11 DIAGNOSIS — R78.81 BACTEREMIA DUE TO ENTEROCOCCUS: ICD-10-CM

## 2024-06-11 DIAGNOSIS — Z99.2 ANEMIA DUE TO CHRONIC KIDNEY DISEASE, ON CHRONIC DIALYSIS: ICD-10-CM

## 2024-06-11 DIAGNOSIS — N17.9 ACUTE RENAL FAILURE SUPERIMPOSED ON STAGE 4 CHRONIC KIDNEY DISEASE: Primary | ICD-10-CM

## 2024-06-11 DIAGNOSIS — E16.2 HYPOGLYCEMIA: ICD-10-CM

## 2024-06-11 DIAGNOSIS — Z79.01 ANTICOAGULANT LONG-TERM USE: ICD-10-CM

## 2024-06-11 DIAGNOSIS — R07.89 CHEST WALL PAIN: ICD-10-CM

## 2024-06-11 DIAGNOSIS — I25.10 CORONARY ARTERY DISEASE, UNSPECIFIED VESSEL OR LESION TYPE, UNSPECIFIED WHETHER ANGINA PRESENT, UNSPECIFIED WHETHER NATIVE OR TRANSPLANTED HEART: ICD-10-CM

## 2024-06-11 DIAGNOSIS — E11.9 TYPE 2 DIABETES MELLITUS WITHOUT COMPLICATION, WITHOUT LONG-TERM CURRENT USE OF INSULIN: ICD-10-CM

## 2024-06-11 DIAGNOSIS — I50.22 CHRONIC SYSTOLIC CONGESTIVE HEART FAILURE: ICD-10-CM

## 2024-06-11 DIAGNOSIS — Z74.09 IMPAIRED MOBILITY: ICD-10-CM

## 2024-06-11 DIAGNOSIS — D50.0 IRON DEFICIENCY ANEMIA DUE TO CHRONIC BLOOD LOSS: ICD-10-CM

## 2024-06-11 DIAGNOSIS — Z99.2 CKD (CHRONIC KIDNEY DISEASE) STAGE V REQUIRING CHRONIC DIALYSIS: ICD-10-CM

## 2024-06-11 LAB
ALBUMIN SERPL BCP-MCNC: 2.6 G/DL (ref 3.5–5.2)
ALLENS TEST: NORMAL
ALP SERPL-CCNC: 142 U/L (ref 55–135)
ALT SERPL W/O P-5'-P-CCNC: 59 U/L (ref 10–44)
ANION GAP SERPL CALC-SCNC: 18 MMOL/L (ref 8–16)
ANION GAP SERPL CALC-SCNC: 19 MMOL/L (ref 8–16)
APTT PPP: 46.5 SEC (ref 21–32)
AST SERPL-CCNC: 58 U/L (ref 10–40)
BASOPHILS # BLD AUTO: 0.01 K/UL (ref 0–0.2)
BASOPHILS NFR BLD: 0.1 % (ref 0–1.9)
BILIRUB SERPL-MCNC: 0.2 MG/DL (ref 0.1–1)
BNP SERPL-MCNC: 1388 PG/ML (ref 0–99)
BUN SERPL-MCNC: 104 MG/DL (ref 8–23)
BUN SERPL-MCNC: 117 MG/DL (ref 8–23)
CALCIUM SERPL-MCNC: 8.7 MG/DL (ref 8.7–10.5)
CALCIUM SERPL-MCNC: 9.1 MG/DL (ref 8.7–10.5)
CHLORIDE SERPL-SCNC: 100 MMOL/L (ref 95–110)
CHLORIDE SERPL-SCNC: 99 MMOL/L (ref 95–110)
CO2 SERPL-SCNC: 20 MMOL/L (ref 23–29)
CO2 SERPL-SCNC: 21 MMOL/L (ref 23–29)
CREAT SERPL-MCNC: 9.8 MG/DL (ref 0.5–1.4)
CREAT SERPL-MCNC: 9.9 MG/DL (ref 0.5–1.4)
DELSYS: NORMAL
DIFFERENTIAL METHOD BLD: ABNORMAL
EOSINOPHIL # BLD AUTO: 0 K/UL (ref 0–0.5)
EOSINOPHIL NFR BLD: 0.4 % (ref 0–8)
ERYTHROCYTE [DISTWIDTH] IN BLOOD BY AUTOMATED COUNT: 17.2 % (ref 11.5–14.5)
EST. GFR  (NO RACE VARIABLE): 5.4 ML/MIN/1.73 M^2
EST. GFR  (NO RACE VARIABLE): 5.5 ML/MIN/1.73 M^2
GLUCOSE SERPL-MCNC: 38 MG/DL (ref 70–110)
GLUCOSE SERPL-MCNC: 84 MG/DL (ref 70–110)
HCT VFR BLD AUTO: 19.8 % (ref 40–54)
HGB BLD-MCNC: 6.3 G/DL (ref 14–18)
IMM GRANULOCYTES # BLD AUTO: 0.05 K/UL (ref 0–0.04)
IMM GRANULOCYTES NFR BLD AUTO: 0.7 % (ref 0–0.5)
INR PPP: 2.4 (ref 0.8–1.2)
LDH SERPL L TO P-CCNC: 1.35 MMOL/L (ref 0.5–2.2)
LIPASE SERPL-CCNC: 27 U/L (ref 4–60)
LYMPHOCYTES # BLD AUTO: 0.4 K/UL (ref 1–4.8)
LYMPHOCYTES NFR BLD: 6 % (ref 18–48)
MAGNESIUM SERPL-MCNC: 2.4 MG/DL (ref 1.6–2.6)
MCH RBC QN AUTO: 28.6 PG (ref 27–31)
MCHC RBC AUTO-ENTMCNC: 31.8 G/DL (ref 32–36)
MCV RBC AUTO: 90 FL (ref 82–98)
MODE: NORMAL
MONOCYTES # BLD AUTO: 0.6 K/UL (ref 0.3–1)
MONOCYTES NFR BLD: 8.8 % (ref 4–15)
NEUTROPHILS # BLD AUTO: 5.9 K/UL (ref 1.8–7.7)
NEUTROPHILS NFR BLD: 84 % (ref 38–73)
NRBC BLD-RTO: 0 /100 WBC
PHOSPHATE SERPL-MCNC: 7.5 MG/DL (ref 2.7–4.5)
PLATELET # BLD AUTO: 352 K/UL (ref 150–450)
PMV BLD AUTO: 9.9 FL (ref 9.2–12.9)
POTASSIUM SERPL-SCNC: 4.1 MMOL/L (ref 3.5–5.1)
POTASSIUM SERPL-SCNC: 4.2 MMOL/L (ref 3.5–5.1)
PROCALCITONIN SERPL IA-MCNC: 0.74 NG/ML
PROT SERPL-MCNC: 7.6 G/DL (ref 6–8.4)
PROTHROMBIN TIME: 24.8 SEC (ref 9–12.5)
RBC # BLD AUTO: 2.2 M/UL (ref 4.6–6.2)
SAMPLE: NORMAL
SITE: NORMAL
SODIUM SERPL-SCNC: 138 MMOL/L (ref 136–145)
SODIUM SERPL-SCNC: 139 MMOL/L (ref 136–145)
TROPONIN I SERPL DL<=0.01 NG/ML-MCNC: 0.05 NG/ML (ref 0–0.03)
WBC # BLD AUTO: 7.04 K/UL (ref 3.9–12.7)

## 2024-06-11 PROCEDURE — 85730 THROMBOPLASTIN TIME PARTIAL: CPT | Performed by: STUDENT IN AN ORGANIZED HEALTH CARE EDUCATION/TRAINING PROGRAM

## 2024-06-11 PROCEDURE — 84484 ASSAY OF TROPONIN QUANT: CPT | Performed by: STUDENT IN AN ORGANIZED HEALTH CARE EDUCATION/TRAINING PROGRAM

## 2024-06-11 PROCEDURE — 63600175 PHARM REV CODE 636 W HCPCS: Performed by: STUDENT IN AN ORGANIZED HEALTH CARE EDUCATION/TRAINING PROGRAM

## 2024-06-11 PROCEDURE — 99285 EMERGENCY DEPT VISIT HI MDM: CPT | Mod: 25

## 2024-06-11 PROCEDURE — 93010 ELECTROCARDIOGRAM REPORT: CPT | Mod: ,,, | Performed by: INTERNAL MEDICINE

## 2024-06-11 PROCEDURE — 85025 COMPLETE CBC W/AUTO DIFF WBC: CPT | Performed by: EMERGENCY MEDICINE

## 2024-06-11 PROCEDURE — 27000248 HC VAD-ADDITIONAL DAY

## 2024-06-11 PROCEDURE — 80053 COMPREHEN METABOLIC PANEL: CPT | Performed by: EMERGENCY MEDICINE

## 2024-06-11 PROCEDURE — 87040 BLOOD CULTURE FOR BACTERIA: CPT | Performed by: STUDENT IN AN ORGANIZED HEALTH CARE EDUCATION/TRAINING PROGRAM

## 2024-06-11 PROCEDURE — 85610 PROTHROMBIN TIME: CPT | Performed by: STUDENT IN AN ORGANIZED HEALTH CARE EDUCATION/TRAINING PROGRAM

## 2024-06-11 PROCEDURE — 99223 1ST HOSP IP/OBS HIGH 75: CPT | Mod: ,,, | Performed by: INTERNAL MEDICINE

## 2024-06-11 PROCEDURE — 96374 THER/PROPH/DIAG INJ IV PUSH: CPT

## 2024-06-11 PROCEDURE — 83690 ASSAY OF LIPASE: CPT | Performed by: STUDENT IN AN ORGANIZED HEALTH CARE EDUCATION/TRAINING PROGRAM

## 2024-06-11 PROCEDURE — 84145 PROCALCITONIN (PCT): CPT | Performed by: STUDENT IN AN ORGANIZED HEALTH CARE EDUCATION/TRAINING PROGRAM

## 2024-06-11 PROCEDURE — 83605 ASSAY OF LACTIC ACID: CPT

## 2024-06-11 PROCEDURE — 83880 ASSAY OF NATRIURETIC PEPTIDE: CPT | Performed by: STUDENT IN AN ORGANIZED HEALTH CARE EDUCATION/TRAINING PROGRAM

## 2024-06-11 PROCEDURE — 99900035 HC TECH TIME PER 15 MIN (STAT)

## 2024-06-11 PROCEDURE — 84100 ASSAY OF PHOSPHORUS: CPT | Performed by: STUDENT IN AN ORGANIZED HEALTH CARE EDUCATION/TRAINING PROGRAM

## 2024-06-11 PROCEDURE — 83735 ASSAY OF MAGNESIUM: CPT | Performed by: STUDENT IN AN ORGANIZED HEALTH CARE EDUCATION/TRAINING PROGRAM

## 2024-06-11 PROCEDURE — 12000002 HC ACUTE/MED SURGE SEMI-PRIVATE ROOM

## 2024-06-11 PROCEDURE — 80048 BASIC METABOLIC PNL TOTAL CA: CPT | Performed by: STUDENT IN AN ORGANIZED HEALTH CARE EDUCATION/TRAINING PROGRAM

## 2024-06-11 PROCEDURE — 93005 ELECTROCARDIOGRAM TRACING: CPT

## 2024-06-11 RX ORDER — VENLAFAXINE 37.5 MG/1
37.5 TABLET ORAL DAILY
Status: DISCONTINUED | OUTPATIENT
Start: 2024-06-12 | End: 2024-07-01 | Stop reason: HOSPADM

## 2024-06-11 RX ORDER — CHOLECALCIFEROL (VITAMIN D3) 25 MCG
1000 TABLET ORAL DAILY
Status: DISCONTINUED | OUTPATIENT
Start: 2024-06-12 | End: 2024-07-01 | Stop reason: HOSPADM

## 2024-06-11 RX ORDER — AMIODARONE HYDROCHLORIDE 200 MG/1
400 TABLET ORAL DAILY
Status: DISCONTINUED | OUTPATIENT
Start: 2024-06-12 | End: 2024-07-01 | Stop reason: HOSPADM

## 2024-06-11 RX ORDER — FOLIC ACID 1 MG/1
1 TABLET ORAL DAILY
Status: DISCONTINUED | OUTPATIENT
Start: 2024-06-12 | End: 2024-07-01 | Stop reason: HOSPADM

## 2024-06-11 RX ORDER — FAMOTIDINE 20 MG/1
20 TABLET, FILM COATED ORAL DAILY
Status: DISCONTINUED | OUTPATIENT
Start: 2024-06-12 | End: 2024-06-12

## 2024-06-11 RX ORDER — WARFARIN 2.5 MG/1
2.5 TABLET ORAL DAILY
Status: DISCONTINUED | OUTPATIENT
Start: 2024-06-11 | End: 2024-06-12

## 2024-06-11 RX ORDER — BUMETANIDE 0.25 MG/ML
2 INJECTION INTRAMUSCULAR; INTRAVENOUS DAILY
Status: DISCONTINUED | OUTPATIENT
Start: 2024-06-12 | End: 2024-06-12

## 2024-06-11 RX ORDER — ATORVASTATIN CALCIUM 20 MG/1
40 TABLET, FILM COATED ORAL NIGHTLY
Status: DISCONTINUED | OUTPATIENT
Start: 2024-06-11 | End: 2024-07-01 | Stop reason: HOSPADM

## 2024-06-11 RX ORDER — MORPHINE SULFATE 2 MG/ML
2 INJECTION, SOLUTION INTRAMUSCULAR; INTRAVENOUS
Status: COMPLETED | OUTPATIENT
Start: 2024-06-11 | End: 2024-06-11

## 2024-06-11 RX ADMIN — MORPHINE SULFATE 2 MG: 2 INJECTION, SOLUTION INTRAMUSCULAR; INTRAVENOUS at 07:06

## 2024-06-11 NOTE — FIRST PROVIDER EVALUATION
Medical screening examination initiated.  I have conducted a focused provider triage encounter, findings are as follows:    Brief history of present illness:  65 yo M w/ h/o LVAD presents w/ weakness and pain to L side    There were no vitals filed for this visit.    Pertinent physical exam:  LVAD drive line site dressing in place    Brief workup plan:  call LVAD coordinator, cardiology on call. Cbc, cmp, lactic    Preliminary workup initiated; this workup will be continued and followed by the physician or advanced practice provider that is assigned to the patient when roomed.

## 2024-06-11 NOTE — PROVIDER PROGRESS NOTES - EMERGENCY DEPT.
Encounter Date: 6/11/2024    ED Physician Progress Notes         EKG - STEMI Decision  Initial Reading: No STEMI present.    LVAD

## 2024-06-12 PROBLEM — I50.43 ACUTE ON CHRONIC COMBINED SYSTOLIC (CONGESTIVE) AND DIASTOLIC (CONGESTIVE) HEART FAILURE: Status: ACTIVE | Noted: 2024-06-12

## 2024-06-12 PROBLEM — E11.9 TYPE 2 DIABETES MELLITUS WITHOUT COMPLICATION, WITHOUT LONG-TERM CURRENT USE OF INSULIN: Status: RESOLVED | Noted: 2023-10-07 | Resolved: 2024-06-12

## 2024-06-12 PROBLEM — N19 RENAL FAILURE SYNDROME: Status: ACTIVE | Noted: 2024-06-12

## 2024-06-12 PROBLEM — E16.2 HYPOGLYCEMIA: Status: ACTIVE | Noted: 2024-06-12

## 2024-06-12 LAB
ALBUMIN SERPL BCP-MCNC: 2.5 G/DL (ref 3.5–5.2)
ALLENS TEST: ABNORMAL
ALP SERPL-CCNC: 148 U/L (ref 55–135)
ALT SERPL W/O P-5'-P-CCNC: 67 U/L (ref 10–44)
ANION GAP SERPL CALC-SCNC: 13 MMOL/L (ref 8–16)
ANION GAP SERPL CALC-SCNC: 13 MMOL/L (ref 8–16)
ANION GAP SERPL CALC-SCNC: 16 MMOL/L (ref 8–16)
ANION GAP SERPL CALC-SCNC: 17 MMOL/L (ref 8–16)
APTT PPP: 45.2 SEC (ref 21–32)
AST SERPL-CCNC: 73 U/L (ref 10–40)
BACTERIA #/AREA URNS AUTO: ABNORMAL /HPF
BASOPHILS # BLD AUTO: 0.03 K/UL (ref 0–0.2)
BASOPHILS # BLD AUTO: 0.03 K/UL (ref 0–0.2)
BASOPHILS NFR BLD: 0.3 % (ref 0–1.9)
BASOPHILS NFR BLD: 0.5 % (ref 0–1.9)
BILIRUB DIRECT SERPL-MCNC: 0.2 MG/DL (ref 0.1–0.3)
BILIRUB SERPL-MCNC: 0.2 MG/DL (ref 0.1–1)
BILIRUB UR QL STRIP: NEGATIVE
BUN SERPL-MCNC: 112 MG/DL (ref 8–23)
BUN SERPL-MCNC: 114 MG/DL (ref 8–23)
BUN SERPL-MCNC: 69 MG/DL (ref 8–23)
BUN SERPL-MCNC: 98 MG/DL (ref 8–23)
CALCIUM SERPL-MCNC: 8.6 MG/DL (ref 8.7–10.5)
CALCIUM SERPL-MCNC: 8.7 MG/DL (ref 8.7–10.5)
CALCIUM SERPL-MCNC: 8.9 MG/DL (ref 8.7–10.5)
CALCIUM SERPL-MCNC: 8.9 MG/DL (ref 8.7–10.5)
CHLORIDE SERPL-SCNC: 96 MMOL/L (ref 95–110)
CHLORIDE SERPL-SCNC: 97 MMOL/L (ref 95–110)
CHLORIDE SERPL-SCNC: 99 MMOL/L (ref 95–110)
CHLORIDE SERPL-SCNC: 99 MMOL/L (ref 95–110)
CLARITY UR REFRACT.AUTO: ABNORMAL
CO2 SERPL-SCNC: 17 MMOL/L (ref 23–29)
CO2 SERPL-SCNC: 20 MMOL/L (ref 23–29)
CO2 SERPL-SCNC: 20 MMOL/L (ref 23–29)
CO2 SERPL-SCNC: 22 MMOL/L (ref 23–29)
COLOR UR AUTO: COLORLESS
CREAT SERPL-MCNC: 6.1 MG/DL (ref 0.5–1.4)
CREAT SERPL-MCNC: 8.3 MG/DL (ref 0.5–1.4)
CREAT SERPL-MCNC: 9.4 MG/DL (ref 0.5–1.4)
CREAT SERPL-MCNC: 9.5 MG/DL (ref 0.5–1.4)
CRP SERPL-MCNC: 159.7 MG/L (ref 0–8.2)
DELSYS: ABNORMAL
DIFFERENTIAL METHOD BLD: ABNORMAL
DIFFERENTIAL METHOD BLD: ABNORMAL
EOSINOPHIL # BLD AUTO: 0.1 K/UL (ref 0–0.5)
EOSINOPHIL # BLD AUTO: 0.1 K/UL (ref 0–0.5)
EOSINOPHIL NFR BLD: 0.6 % (ref 0–8)
EOSINOPHIL NFR BLD: 1.1 % (ref 0–8)
ERYTHROCYTE [DISTWIDTH] IN BLOOD BY AUTOMATED COUNT: 17.2 % (ref 11.5–14.5)
ERYTHROCYTE [DISTWIDTH] IN BLOOD BY AUTOMATED COUNT: 17.3 % (ref 11.5–14.5)
EST. GFR  (NO RACE VARIABLE): 5.7 ML/MIN/1.73 M^2
EST. GFR  (NO RACE VARIABLE): 5.8 ML/MIN/1.73 M^2
EST. GFR  (NO RACE VARIABLE): 6.7 ML/MIN/1.73 M^2
EST. GFR  (NO RACE VARIABLE): 9.7 ML/MIN/1.73 M^2
FERRITIN SERPL-MCNC: 1186 NG/ML (ref 20–300)
FIO2: 21
FIO2: 21
FLOW: 2
GLUCOSE SERPL-MCNC: 151 MG/DL (ref 70–110)
GLUCOSE SERPL-MCNC: 174 MG/DL (ref 70–110)
GLUCOSE SERPL-MCNC: 32 MG/DL (ref 70–110)
GLUCOSE SERPL-MCNC: 81 MG/DL (ref 70–110)
GLUCOSE UR QL STRIP: NEGATIVE
HCO3 UR-SCNC: 22.5 MMOL/L (ref 24–28)
HCO3 UR-SCNC: 23.9 MMOL/L (ref 24–28)
HCO3 UR-SCNC: 23.9 MMOL/L (ref 24–28)
HCO3 UR-SCNC: 24.1 MMOL/L (ref 24–28)
HCO3 UR-SCNC: 25.6 MMOL/L (ref 24–28)
HCT VFR BLD AUTO: 20.2 % (ref 40–54)
HCT VFR BLD AUTO: 20.5 % (ref 40–54)
HGB BLD-MCNC: 6.3 G/DL (ref 14–18)
HGB BLD-MCNC: 6.5 G/DL (ref 14–18)
HGB UR QL STRIP: ABNORMAL
HYALINE CASTS UR QL AUTO: 5 /LPF
IMM GRANULOCYTES # BLD AUTO: 0.05 K/UL (ref 0–0.04)
IMM GRANULOCYTES # BLD AUTO: 0.06 K/UL (ref 0–0.04)
IMM GRANULOCYTES NFR BLD AUTO: 0.6 % (ref 0–0.5)
IMM GRANULOCYTES NFR BLD AUTO: 0.8 % (ref 0–0.5)
INR PPP: 2.4 (ref 0.8–1.2)
IRON SERPL-MCNC: 15 UG/DL (ref 45–160)
KETONES UR QL STRIP: NEGATIVE
LDH SERPL L TO P-CCNC: 394 U/L (ref 110–260)
LEUKOCYTE ESTERASE UR QL STRIP: ABNORMAL
LYMPHOCYTES # BLD AUTO: 0.3 K/UL (ref 1–4.8)
LYMPHOCYTES # BLD AUTO: 0.5 K/UL (ref 1–4.8)
LYMPHOCYTES NFR BLD: 2.3 % (ref 18–48)
LYMPHOCYTES NFR BLD: 8.1 % (ref 18–48)
MAGNESIUM SERPL-MCNC: 2 MG/DL (ref 1.6–2.6)
MAGNESIUM SERPL-MCNC: 2.2 MG/DL (ref 1.6–2.6)
MAGNESIUM SERPL-MCNC: 2.3 MG/DL (ref 1.6–2.6)
MAGNESIUM SERPL-MCNC: 2.3 MG/DL (ref 1.6–2.6)
MCH RBC QN AUTO: 27.9 PG (ref 27–31)
MCH RBC QN AUTO: 29 PG (ref 27–31)
MCHC RBC AUTO-ENTMCNC: 30.7 G/DL (ref 32–36)
MCHC RBC AUTO-ENTMCNC: 32.2 G/DL (ref 32–36)
MCV RBC AUTO: 90 FL (ref 82–98)
MCV RBC AUTO: 91 FL (ref 82–98)
MICROSCOPIC COMMENT: ABNORMAL
MODE: ABNORMAL
MONOCYTES # BLD AUTO: 0.6 K/UL (ref 0.3–1)
MONOCYTES # BLD AUTO: 0.7 K/UL (ref 0.3–1)
MONOCYTES NFR BLD: 6 % (ref 4–15)
MONOCYTES NFR BLD: 8.9 % (ref 4–15)
NEUTROPHILS # BLD AUTO: 5.4 K/UL (ref 1.8–7.7)
NEUTROPHILS # BLD AUTO: 9.8 K/UL (ref 1.8–7.7)
NEUTROPHILS NFR BLD: 80.6 % (ref 38–73)
NEUTROPHILS NFR BLD: 90.2 % (ref 38–73)
NITRITE UR QL STRIP: NEGATIVE
NRBC BLD-RTO: 0 /100 WBC
NRBC BLD-RTO: 0 /100 WBC
OHS QRS DURATION: 180 MS
OHS QTC CALCULATION: 619 MS
PCO2 BLDA: 38.4 MMHG (ref 35–45)
PCO2 BLDA: 40.1 MMHG (ref 35–45)
PCO2 BLDA: 44.2 MMHG (ref 35–45)
PCO2 BLDA: 44.6 MMHG (ref 35–45)
PCO2 BLDA: 48.1 MMHG (ref 35–45)
PH SMN: 7.33 [PH] (ref 7.35–7.45)
PH SMN: 7.34 [PH] (ref 7.35–7.45)
PH SMN: 7.34 [PH] (ref 7.35–7.45)
PH SMN: 7.36 [PH] (ref 7.35–7.45)
PH SMN: 7.41 [PH] (ref 7.35–7.45)
PH UR STRIP: 6 [PH] (ref 5–8)
PHOSPHATE SERPL-MCNC: 5 MG/DL (ref 2.7–4.5)
PHOSPHATE SERPL-MCNC: 8.2 MG/DL (ref 2.7–4.5)
PHOSPHATE SERPL-MCNC: 8.7 MG/DL (ref 2.7–4.5)
PLATELET # BLD AUTO: 319 K/UL (ref 150–450)
PLATELET # BLD AUTO: 357 K/UL (ref 150–450)
PMV BLD AUTO: 9.1 FL (ref 9.2–12.9)
PMV BLD AUTO: 9.6 FL (ref 9.2–12.9)
PO2 BLDA: 27 MMHG (ref 40–60)
PO2 BLDA: 29 MMHG (ref 40–60)
PO2 BLDA: 31 MMHG (ref 40–60)
PO2 BLDA: 79 MMHG (ref 80–100)
PO2 BLDA: 90 MMHG (ref 80–100)
POC BE: -1 MMOL/L
POC BE: -2 MMOL/L
POC BE: -2 MMOL/L
POC BE: -3 MMOL/L
POC BE: 0 MMOL/L
POC SATURATED O2: 46 % (ref 95–100)
POC SATURATED O2: 50 % (ref 95–100)
POC SATURATED O2: 55 % (ref 95–100)
POC SATURATED O2: 96 % (ref 95–100)
POC SATURATED O2: 97 % (ref 95–100)
POC TCO2: 24 MMOL/L (ref 23–27)
POC TCO2: 25 MMOL/L (ref 23–27)
POC TCO2: 25 MMOL/L (ref 24–29)
POC TCO2: 25 MMOL/L (ref 24–29)
POC TCO2: 27 MMOL/L (ref 24–29)
POCT GLUCOSE: 103 MG/DL (ref 70–110)
POCT GLUCOSE: 105 MG/DL (ref 70–110)
POCT GLUCOSE: 113 MG/DL (ref 70–110)
POCT GLUCOSE: 126 MG/DL (ref 70–110)
POCT GLUCOSE: 128 MG/DL (ref 70–110)
POCT GLUCOSE: 153 MG/DL (ref 70–110)
POCT GLUCOSE: 178 MG/DL (ref 70–110)
POCT GLUCOSE: 37 MG/DL (ref 70–110)
POCT GLUCOSE: 39 MG/DL (ref 70–110)
POCT GLUCOSE: 57 MG/DL (ref 70–110)
POCT GLUCOSE: 58 MG/DL (ref 70–110)
POCT GLUCOSE: 67 MG/DL (ref 70–110)
POCT GLUCOSE: 68 MG/DL (ref 70–110)
POCT GLUCOSE: 74 MG/DL (ref 70–110)
POCT GLUCOSE: 83 MG/DL (ref 70–110)
POCT GLUCOSE: 97 MG/DL (ref 70–110)
POTASSIUM SERPL-SCNC: 3.9 MMOL/L (ref 3.5–5.1)
POTASSIUM SERPL-SCNC: 4 MMOL/L (ref 3.5–5.1)
POTASSIUM SERPL-SCNC: 4.1 MMOL/L (ref 3.5–5.1)
POTASSIUM SERPL-SCNC: 4.2 MMOL/L (ref 3.5–5.1)
PREALB SERPL-MCNC: 25 MG/DL (ref 20–43)
PROT SERPL-MCNC: 7.5 G/DL (ref 6–8.4)
PROT UR QL STRIP: ABNORMAL
PROTHROMBIN TIME: 25.3 SEC (ref 9–12.5)
RBC # BLD AUTO: 2.24 M/UL (ref 4.6–6.2)
RBC # BLD AUTO: 2.26 M/UL (ref 4.6–6.2)
RBC #/AREA URNS AUTO: >100 /HPF (ref 0–4)
SAMPLE: ABNORMAL
SATURATED IRON: 6 % (ref 20–50)
SITE: ABNORMAL
SODIUM SERPL-SCNC: 129 MMOL/L (ref 136–145)
SODIUM SERPL-SCNC: 130 MMOL/L (ref 136–145)
SODIUM SERPL-SCNC: 134 MMOL/L (ref 136–145)
SODIUM SERPL-SCNC: 136 MMOL/L (ref 136–145)
SP GR UR STRIP: 1.01 (ref 1–1.03)
SQUAMOUS #/AREA URNS AUTO: 1 /HPF
TOTAL IRON BINDING CAPACITY: 268 UG/DL (ref 250–450)
TRANSFERRIN SERPL-MCNC: 181 MG/DL (ref 200–375)
URN SPEC COLLECT METH UR: ABNORMAL
WBC # BLD AUTO: 10.8 K/UL (ref 3.9–12.7)
WBC # BLD AUTO: 6.63 K/UL (ref 3.9–12.7)
WBC #/AREA URNS AUTO: 67 /HPF (ref 0–5)

## 2024-06-12 PROCEDURE — 83735 ASSAY OF MAGNESIUM: CPT | Mod: 91 | Performed by: NURSE PRACTITIONER

## 2024-06-12 PROCEDURE — 25000003 PHARM REV CODE 250: Performed by: STUDENT IN AN ORGANIZED HEALTH CARE EDUCATION/TRAINING PROGRAM

## 2024-06-12 PROCEDURE — 83735 ASSAY OF MAGNESIUM: CPT | Performed by: STUDENT IN AN ORGANIZED HEALTH CARE EDUCATION/TRAINING PROGRAM

## 2024-06-12 PROCEDURE — 87086 URINE CULTURE/COLONY COUNT: CPT | Performed by: EMERGENCY MEDICINE

## 2024-06-12 PROCEDURE — 90945 DIALYSIS ONE EVALUATION: CPT

## 2024-06-12 PROCEDURE — 63600175 PHARM REV CODE 636 W HCPCS: Performed by: STUDENT IN AN ORGANIZED HEALTH CARE EDUCATION/TRAINING PROGRAM

## 2024-06-12 PROCEDURE — 80076 HEPATIC FUNCTION PANEL: CPT | Performed by: STUDENT IN AN ORGANIZED HEALTH CARE EDUCATION/TRAINING PROGRAM

## 2024-06-12 PROCEDURE — 85730 THROMBOPLASTIN TIME PARTIAL: CPT | Performed by: STUDENT IN AN ORGANIZED HEALTH CARE EDUCATION/TRAINING PROGRAM

## 2024-06-12 PROCEDURE — 02H633Z INSERTION OF INFUSION DEVICE INTO RIGHT ATRIUM, PERCUTANEOUS APPROACH: ICD-10-PCS | Performed by: RADIOLOGY

## 2024-06-12 PROCEDURE — 94761 N-INVAS EAR/PLS OXIMETRY MLT: CPT | Mod: XB

## 2024-06-12 PROCEDURE — 99900035 HC TECH TIME PER 15 MIN (STAT)

## 2024-06-12 PROCEDURE — 82803 BLOOD GASES ANY COMBINATION: CPT

## 2024-06-12 PROCEDURE — 85025 COMPLETE CBC W/AUTO DIFF WBC: CPT | Performed by: STUDENT IN AN ORGANIZED HEALTH CARE EDUCATION/TRAINING PROGRAM

## 2024-06-12 PROCEDURE — 02HV33Z INSERTION OF INFUSION DEVICE INTO SUPERIOR VENA CAVA, PERCUTANEOUS APPROACH: ICD-10-PCS | Performed by: INTERNAL MEDICINE

## 2024-06-12 PROCEDURE — 63600175 PHARM REV CODE 636 W HCPCS: Performed by: NURSE PRACTITIONER

## 2024-06-12 PROCEDURE — 03HY32Z INSERTION OF MONITORING DEVICE INTO UPPER ARTERY, PERCUTANEOUS APPROACH: ICD-10-PCS | Performed by: INTERNAL MEDICINE

## 2024-06-12 PROCEDURE — 25000003 PHARM REV CODE 250: Performed by: NURSE PRACTITIONER

## 2024-06-12 PROCEDURE — 5A1D70Z PERFORMANCE OF URINARY FILTRATION, INTERMITTENT, LESS THAN 6 HOURS PER DAY: ICD-10-PCS | Performed by: INTERNAL MEDICINE

## 2024-06-12 PROCEDURE — 27000923 HC TRIALYSIS CATHETER, ANY SIZE

## 2024-06-12 PROCEDURE — 86140 C-REACTIVE PROTEIN: CPT | Performed by: STUDENT IN AN ORGANIZED HEALTH CARE EDUCATION/TRAINING PROGRAM

## 2024-06-12 PROCEDURE — 21400001 HC TELEMETRY ROOM

## 2024-06-12 PROCEDURE — 27000248 HC VAD-ADDITIONAL DAY

## 2024-06-12 PROCEDURE — 84466 ASSAY OF TRANSFERRIN: CPT | Performed by: STUDENT IN AN ORGANIZED HEALTH CARE EDUCATION/TRAINING PROGRAM

## 2024-06-12 PROCEDURE — 25000003 PHARM REV CODE 250

## 2024-06-12 PROCEDURE — 0JH63XZ INSERTION OF TUNNELED VASCULAR ACCESS DEVICE INTO CHEST SUBCUTANEOUS TISSUE AND FASCIA, PERCUTANEOUS APPROACH: ICD-10-PCS | Performed by: RADIOLOGY

## 2024-06-12 PROCEDURE — 83615 LACTATE (LD) (LDH) ENZYME: CPT | Performed by: STUDENT IN AN ORGANIZED HEALTH CARE EDUCATION/TRAINING PROGRAM

## 2024-06-12 PROCEDURE — 85610 PROTHROMBIN TIME: CPT | Performed by: STUDENT IN AN ORGANIZED HEALTH CARE EDUCATION/TRAINING PROGRAM

## 2024-06-12 PROCEDURE — 82800 BLOOD PH: CPT

## 2024-06-12 PROCEDURE — 80048 BASIC METABOLIC PNL TOTAL CA: CPT | Performed by: STUDENT IN AN ORGANIZED HEALTH CARE EDUCATION/TRAINING PROGRAM

## 2024-06-12 PROCEDURE — 84100 ASSAY OF PHOSPHORUS: CPT | Performed by: STUDENT IN AN ORGANIZED HEALTH CARE EDUCATION/TRAINING PROGRAM

## 2024-06-12 PROCEDURE — 37799 UNLISTED PX VASCULAR SURGERY: CPT

## 2024-06-12 PROCEDURE — 27000221 HC OXYGEN, UP TO 24 HOURS

## 2024-06-12 PROCEDURE — 80069 RENAL FUNCTION PANEL: CPT | Mod: 91 | Performed by: NURSE PRACTITIONER

## 2024-06-12 PROCEDURE — 81001 URINALYSIS AUTO W/SCOPE: CPT | Performed by: EMERGENCY MEDICINE

## 2024-06-12 PROCEDURE — 80048 BASIC METABOLIC PNL TOTAL CA: CPT | Mod: 91 | Performed by: NURSE PRACTITIONER

## 2024-06-12 PROCEDURE — 85025 COMPLETE CBC W/AUTO DIFF WBC: CPT | Mod: 91 | Performed by: INTERNAL MEDICINE

## 2024-06-12 PROCEDURE — 82728 ASSAY OF FERRITIN: CPT | Performed by: STUDENT IN AN ORGANIZED HEALTH CARE EDUCATION/TRAINING PROGRAM

## 2024-06-12 PROCEDURE — 84134 ASSAY OF PREALBUMIN: CPT | Performed by: STUDENT IN AN ORGANIZED HEALTH CARE EDUCATION/TRAINING PROGRAM

## 2024-06-12 RX ORDER — GLUCAGON 1 MG
1 KIT INJECTION
Status: DISCONTINUED | OUTPATIENT
Start: 2024-06-12 | End: 2024-06-14

## 2024-06-12 RX ORDER — AMOXICILLIN 500 MG/1
500 CAPSULE ORAL EVERY 12 HOURS
Status: DISCONTINUED | OUTPATIENT
Start: 2024-06-12 | End: 2024-06-16

## 2024-06-12 RX ORDER — HYDROCODONE BITARTRATE AND ACETAMINOPHEN 500; 5 MG/1; MG/1
TABLET ORAL CONTINUOUS
Status: ACTIVE | OUTPATIENT
Start: 2024-06-12 | End: 2024-06-13

## 2024-06-12 RX ORDER — IBUPROFEN 200 MG
24 TABLET ORAL
Status: DISCONTINUED | OUTPATIENT
Start: 2024-06-12 | End: 2024-06-14

## 2024-06-12 RX ORDER — GLUCAGON 1 MG
1 KIT INJECTION
Status: DISCONTINUED | OUTPATIENT
Start: 2024-06-12 | End: 2024-06-16

## 2024-06-12 RX ORDER — WARFARIN 2.5 MG/1
2.5 TABLET ORAL DAILY
Status: DISCONTINUED | OUTPATIENT
Start: 2024-06-12 | End: 2024-06-12

## 2024-06-12 RX ORDER — BUMETANIDE 0.25 MG/ML
4 INJECTION INTRAMUSCULAR; INTRAVENOUS EVERY 12 HOURS
Status: DISCONTINUED | OUTPATIENT
Start: 2024-06-12 | End: 2024-06-12

## 2024-06-12 RX ORDER — LIDOCAINE HYDROCHLORIDE 10 MG/ML
INJECTION, SOLUTION EPIDURAL; INFILTRATION; INTRACAUDAL; PERINEURAL
Status: COMPLETED
Start: 2024-06-12 | End: 2024-06-12

## 2024-06-12 RX ORDER — HYDRALAZINE HYDROCHLORIDE 50 MG/1
100 TABLET, FILM COATED ORAL EVERY 8 HOURS
Status: DISCONTINUED | OUTPATIENT
Start: 2024-06-12 | End: 2024-06-18

## 2024-06-12 RX ORDER — WARFARIN 2.5 MG/1
2.5 TABLET ORAL DAILY
Status: DISCONTINUED | OUTPATIENT
Start: 2024-06-12 | End: 2024-06-14

## 2024-06-12 RX ORDER — SODIUM CHLORIDE 9 MG/ML
INJECTION, SOLUTION INTRAVENOUS CONTINUOUS
Status: DISCONTINUED | OUTPATIENT
Start: 2024-06-12 | End: 2024-06-24

## 2024-06-12 RX ORDER — DEXTROSE MONOHYDRATE 100 MG/ML
INJECTION, SOLUTION INTRAVENOUS CONTINUOUS
Status: DISCONTINUED | OUTPATIENT
Start: 2024-06-12 | End: 2024-06-14

## 2024-06-12 RX ORDER — ACETAMINOPHEN 325 MG/1
650 TABLET ORAL EVERY 6 HOURS PRN
Status: DISCONTINUED | OUTPATIENT
Start: 2024-06-12 | End: 2024-07-01 | Stop reason: HOSPADM

## 2024-06-12 RX ORDER — IBUPROFEN 200 MG
16 TABLET ORAL
Status: DISCONTINUED | OUTPATIENT
Start: 2024-06-12 | End: 2024-06-14

## 2024-06-12 RX ORDER — MAGNESIUM SULFATE HEPTAHYDRATE 40 MG/ML
2 INJECTION, SOLUTION INTRAVENOUS
Status: ACTIVE | OUTPATIENT
Start: 2024-06-12 | End: 2024-06-13

## 2024-06-12 RX ORDER — GLIMEPIRIDE 1 MG/1
1 TABLET ORAL
Status: ON HOLD | COMMUNITY
End: 2024-07-01 | Stop reason: HOSPADM

## 2024-06-12 RX ORDER — BUMETANIDE 0.25 MG/ML
4 INJECTION INTRAMUSCULAR; INTRAVENOUS ONCE
Status: COMPLETED | OUTPATIENT
Start: 2024-06-12 | End: 2024-06-12

## 2024-06-12 RX ORDER — PANTOPRAZOLE SODIUM 40 MG/1
40 TABLET, DELAYED RELEASE ORAL DAILY
Status: DISCONTINUED | OUTPATIENT
Start: 2024-06-12 | End: 2024-07-01 | Stop reason: HOSPADM

## 2024-06-12 RX ORDER — BUMETANIDE 0.25 MG/ML
1.5 INJECTION INTRAMUSCULAR; INTRAVENOUS ONCE
Status: COMPLETED | OUTPATIENT
Start: 2024-06-13 | End: 2024-06-12

## 2024-06-12 RX ADMIN — ATORVASTATIN CALCIUM 40 MG: 40 TABLET, FILM COATED ORAL at 12:06

## 2024-06-12 RX ADMIN — BUMETANIDE 1.5 MG: 0.25 INJECTION INTRAMUSCULAR; INTRAVENOUS at 11:06

## 2024-06-12 RX ADMIN — AMOXICILLIN 500 MG: 500 CAPSULE ORAL at 08:06

## 2024-06-12 RX ADMIN — HYDRALAZINE HYDROCHLORIDE 100 MG: 50 TABLET ORAL at 02:06

## 2024-06-12 RX ADMIN — PANTOPRAZOLE SODIUM 40 MG: 40 TABLET, DELAYED RELEASE ORAL at 08:06

## 2024-06-12 RX ADMIN — HYDRALAZINE HYDROCHLORIDE 100 MG: 50 TABLET ORAL at 09:06

## 2024-06-12 RX ADMIN — HYDRALAZINE HYDROCHLORIDE 100 MG: 50 TABLET ORAL at 08:06

## 2024-06-12 RX ADMIN — AMIODARONE HYDROCHLORIDE 400 MG: 200 TABLET ORAL at 08:06

## 2024-06-12 RX ADMIN — DEXTROSE MONOHYDRATE: 100 INJECTION, SOLUTION INTRAVENOUS at 09:06

## 2024-06-12 RX ADMIN — WARFARIN SODIUM 2.5 MG: 2.5 TABLET ORAL at 12:06

## 2024-06-12 RX ADMIN — AMOXICILLIN 500 MG: 500 CAPSULE ORAL at 02:06

## 2024-06-12 RX ADMIN — Medication 24 G: at 06:06

## 2024-06-12 RX ADMIN — FOLIC ACID 1 MG: 1 TABLET ORAL at 08:06

## 2024-06-12 RX ADMIN — DEXTROSE MONOHYDRATE: 100 INJECTION, SOLUTION INTRAVENOUS at 05:06

## 2024-06-12 RX ADMIN — CHOLECALCIFEROL TAB 25 MCG (1000 UNIT) 1000 UNITS: 25 TAB at 08:06

## 2024-06-12 RX ADMIN — SODIUM CHLORIDE: 9 INJECTION, SOLUTION INTRAVENOUS at 04:06

## 2024-06-12 RX ADMIN — ATORVASTATIN CALCIUM 40 MG: 40 TABLET, FILM COATED ORAL at 09:06

## 2024-06-12 RX ADMIN — BUMETANIDE 4 MG: 0.25 INJECTION INTRAMUSCULAR; INTRAVENOUS at 12:06

## 2024-06-12 RX ADMIN — WARFARIN SODIUM 2.5 MG: 2.5 TABLET ORAL at 04:06

## 2024-06-12 RX ADMIN — LIDOCAINE HYDROCHLORIDE: 10 SOLUTION INTRAVENOUS at 12:06

## 2024-06-12 RX ADMIN — VENLAFAXINE 37.5 MG: 37.5 TABLET ORAL at 08:06

## 2024-06-12 RX ADMIN — BUMETANIDE 2 MG/HR: 0.25 INJECTION INTRAMUSCULAR; INTRAVENOUS at 09:06

## 2024-06-12 RX ADMIN — BUMETANIDE 4 MG: 0.25 INJECTION INTRAMUSCULAR; INTRAVENOUS at 08:06

## 2024-06-12 RX ADMIN — AMOXICILLIN 500 MG: 500 CAPSULE ORAL at 09:06

## 2024-06-12 RX ADMIN — SODIUM CHLORIDE: 9 INJECTION, SOLUTION INTRAVENOUS at 09:06

## 2024-06-12 RX ADMIN — BUMETANIDE 1 MG/HR: 0.25 INJECTION INTRAMUSCULAR; INTRAVENOUS at 11:06

## 2024-06-12 NOTE — HPI
Mr. Radha Abbott is a 65 yo male with stage D HFrEF, ICMP who underwent HM3 placement as DT (12/1/23) w/ a hospital stay complicated by vasoplegia(requiring Giaprezza), debility, malnutrition/impaired swallowing, and renal failure requiring HD (since weaned off) w/ recent discharge for ADHF presented to the ED with wife due to c/o worsening generalized weakness since 6/10. Per wife patient was mostly sleeping during the day despite sleeping 10 hrs at night. Patient also admits decreased appetite and decreased urine output despite taking his medications as prescribed. He has not urinated since 6/11 at 2pm. He has chronic shortness of breath on exertion. Denies confusion, syncope, diarrhea, constipation, N/V, dysuria, or other complaints. Patient had his prior tunnel cath removed used for HD about 2 weeks ago. He completed a 6 week therapy for Enterococcus bacteremia with ampicillin and rocephin ending 5/30/24a and was switched to PO antibiotics.     On prior hospital stay nephrology consulted during hospital stay due to elevated renal function. They feel he is close to being a dialysis patient. He diuresed on IV Bumex with prn Metolazone. He is net negative 1.2L throughout his hospital stay and weight on day of discharge was 179lbs down from 186lbs on admit. We continued his home dose of Bumex 4mg BID but increased his prn Metolazone to 10mg. He received a dose of Epo while inpatient and he has plans to follow up with Heme/Onc to get outpatient therapy arranged.      2D Echo with CFD done on 3/26/2024  LVAD: There is a Heartmate III LVAD running at 5000 RPM. The aortic valve does not open. The ventricular septum is at midline. Inflow cannula well seated at the apex. Outflow graft not visualized.   Left Ventricle: The left ventricle is moderately dilated. Normal wall thickness. Global hypokinesis present. Septal motion is abnormal. There is severely reduced systolic function with a visually estimated ejection fraction  of 5 - 10%. There is diastolic dysfunction but grade cannot be determined.   Right Ventricle: Mild right ventricular enlargement. Wall thickness is normal. Right ventricle wall motion is normal. Systolic function is normal. Pacemaker lead present in the ventricle.   Left Atrium: Left atrium is severely dilated.   Right Atrium: Right atrium is moderately dilated.   Mitral Valve: The mitral valve is repaired with an Noble stitch. There is mild regurgitation.   IVC/SVC: Normal venous pressure at 3 mmHg.

## 2024-06-12 NOTE — PROCEDURES
"Radha Abbott is a 64 y.o. male patient.    Temp: 97.8 °F (36.6 °C) (24)  Pulse: 78 (24)  Resp: 15 (24)  BP: (!) 78/0 (24)  SpO2: 98 % (24)  Weight: 81.2 kg (179 lb) (24)  Height: 6' 1" (185.4 cm) (24)    Arterial Line    Date/Time: 2024 2:53 AM  Location procedure was performed: [facility] CARDIAC ICU    Performed by: Bernardino Green MD  Authorized by: Bernardino Green MD  Consent Done: Yes  Consent: Verbal consent obtained. Written consent obtained.  Risks and benefits: risks, benefits and alternatives were discussed  Consent given by: patient  Patient understanding: patient states understanding of the procedure being performed  Patient consent: the patient's understanding of the procedure matches consent given  Procedure consent: procedure consent matches procedure scheduled  Relevant documents: relevant documents present and verified  Test results: test results available and properly labeled  Site marked: the operative site was marked  Imaging studies: imaging studies available  Required items: required blood products, implants, devices, and special equipment available  Patient identity confirmed: , MRN and name  Time out: Immediately prior to procedure a "time out" was called to verify the correct patient, procedure, equipment, support staff and site/side marked as required.  Preparation: Patient was prepped and draped in the usual sterile fashion.  Indications: hemodynamic monitoring  Location: right radial  Anesthesia: local infiltration    Anesthesia:  Local Anesthetic: lidocaine 1% without epinephrine    Patient sedated: no  Adonay's test normal: yes  Needle gauge: 20  Number of attempts: 1  Complications: No  Specimens: No  Implants: No  Post-procedure: line sutured and dressing applied  Post-procedure CMS: unchanged  Patient tolerance: Patient tolerated the procedure well with no immediate " complications              6/12/2024     2:01 AM 6/12/2024     1:00 AM 6/12/2024    12:10 AM 6/11/2024     7:35 PM 5/31/2024    12:48 PM 5/31/2024     8:03 AM 5/31/2024     5:00 AM   TXP LVAD INTERROGATIONS   Type HeartMate3 HeartMate3 HeartMate3 HeartMate3 HeartMate3 HeartMate3 HeartMate3   Flow 4 4.1 4.4 4.6 4.3 3.9 3.9   Speed 5000 5000 5000 5000 5000 5050 5050   PI 6.3 5.6 3.7 2.8 3.7 4.5 4.6   Power (Carrington) 3.4 3.4 3.4 3.5 3.5 3.5 3.3   LSL      4600 4600   Pulsatility Intermittent pulse   Intermittent pulse Intermittent pulse Pulse Intermittent pulse         6/12/2024

## 2024-06-12 NOTE — ASSESSMENT & PLAN NOTE
Owing to his advanced kidney disease and significantly elevated creat, he is not on a  ACEI/ARB/ARNI or MRA. He is also not on a beta blocker due to RV dysfunction.     Plan:  - CVP 23. Bumex 4 mg IVP bid changed to Bumex 2 mg/hr with increase in UOP from ~ 25 cc/hr to 50 cc/hr  - will monitor hemodynamics

## 2024-06-12 NOTE — ASSESSMENT & PLAN NOTE
Mr. Radha Abbott is a 65 yo male with stage D HFrEF, ICMP who underwent HM3 placement as DT, DM2, CKD4, HTN, E.faecalis bacteremia who presented due to c/o worsening generalized weakness since 6/10. Admitted for worsening renal function with a BUN>100/Cr. 9.9.     Plan:   - CT Ab/pel: Kidneys/ Ureters: Normal in size and location.  Mild bilateral perinephric stranding, a nonspecific finding and similar to prior.  No hydronephrosis or nephrolithiasis. No ureteral dilatation.   - Nephrology consulted, appreciate recs.   - Will need goodman placement for accurate I & Os.   - RIJ trialysis placed for hemodynamics monitoring and possible future HD  - Renally dosing medications  - Will continue bumex 4mg BID IV for now. Per nephrology no emergent HD needed now.

## 2024-06-12 NOTE — NURSING
Rounded on patient at bedside. Patient is asleep in bed, spouse is at bedside.  LVAD history interrogated with no abnormal events noted.  LVAD parameters currently WNL. Denies any needs at this time. Reported having dressing supplies at home for discharge. Reported no equipment needs. Awaiting dialysis nurse to initiate CRRT. Encouraged spouse to notify nurse if they have any questions, problems or concerns for LVAD coordinator. Verbalized understanding and in agreement of plan.

## 2024-06-12 NOTE — SUBJECTIVE & OBJECTIVE
Past Medical History:   Diagnosis Date    CAD (coronary artery disease)     CHF (congestive heart failure)     Diabetes mellitus     HFrEF (heart failure with reduced ejection fraction)     ICD (implantable cardioverter-defibrillator) in place     MI, old        Past Surgical History:   Procedure Laterality Date    ANGIOPLASTY-VENOUS ARTERY Right 12/1/2023    Procedure: ANGIOPLASTY-VENOUS ARTERY, RIGHT FEMORAL;  Surgeon: Yuri Washington MD;  [facility];  Service: Cardiovascular;  Laterality: Right;    AORTIC VALVULOPLASTY N/A 12/1/2023    Procedure: REPAIR, AORTIC VALVE;  Surgeon: Yuri Washington MD;  [facility];  Service: Cardiovascular;  Laterality: N/A;    CARDIAC SURGERY      CLOSURE N/A 12/1/2023    Procedure: CLOSURE, TEMPORARY;  Surgeon: Yuri Washington MD;  [facility];  Service: Cardiovascular;  Laterality: N/A;    DRAINAGE OF PLEURAL EFFUSION  12/4/2023    Procedure: DRAINAGE, PLEURAL EFFUSION;  Surgeon: Yuri Washington MD;  [facility];  Service: Cardiovascular;;    INSERTION OF GRAFT TO PERICARDIUM  12/4/2023    Procedure: INSERTION, GRAFT, PERICARDIUM;  Surgeon: Yuri Washington MD;  Location: [facility] OR [address] [place] [address];  Service: Cardiovascular;;    INSERTION OF INTRA-AORTIC BALLOON ASSIST DEVICE Right 11/21/2023    Procedure: INSERTION, INTRA-AORTIC BALLOON PUMP;  Surgeon: Finn Cohn MD;  Location: [facility] CATH LAB;  Service: Cardiology;  Laterality: Right;    LEFT VENTRICULAR ASSIST DEVICE Left 12/1/2023    Procedure: INSERTION-LEFT VENTRICULAR ASSIST DEVICE;  Surgeon: Yuri Washington MD;  Location: [facility] OR [address] [place] [address];  Service: Cardiovascular;  Laterality: Left;  REDO STERNOTOMY - REDO SAW NEEDED FOR CASE    LYSIS OF ADHESIONS  12/1/2023    Procedure: LYSIS, ADHESIONS;  Surgeon: Yuri Washington MD;  Location: [facility] OR [address] [place] [address];  Service: Cardiovascular;;    PLACEMENT OF SWAN ROLANDO CATHETER WITH IMAGING GUIDANCE  11/20/2023    Procedure: INSERTION,  CATHETER, SWAN-ROLANDO, WITH IMAGING GUIDANCE;  Surgeon: Sajan Hurley MD;  Location: [facility] CATH LAB;  Service: Cardiology;;    REMOVAL OF TUNNELED CENTRAL VENOUS CATHETER (CVC) N/A 3/1/2024    Procedure: REMOVAL, CATHETER, CENTRAL VENOUS, TUNNELED;  Surgeon: Seble Aguilar MD;  Location: [facility] CATH LAB;  Service: Interventional Nephrology;  Laterality: N/A;    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 12/1/2023    Procedure: REPAIR, ANEURYSM, ARTERY, FEMORAL;  Surgeon: Yuri Washington MD;  [facility];  Service: Cardiovascular;  Laterality: Right;  Right Femoral Artery Repair    RIGHT HEART CATHETERIZATION Right 10/10/2023    Procedure: INSERTION, CATHETER, RIGHT HEART;  Surgeon: Bin Gandhi MD;  Location: [facility] CATH LAB;  Service: Cardiology;  Laterality: Right;    RIGHT HEART CATHETERIZATION Right 10/13/2023    Procedure: INSERTION, CATHETER, RIGHT HEART;  Surgeon: Walter Mcintyre MD;  Location: [facility] CATH LAB;  Service: Cardiology;  Laterality: Right;    RIGHT HEART CATHETERIZATION  11/13/2023    RIGHT HEART CATHETERIZATION Right 11/13/2023    Procedure: INSERTION, CATHETER, RIGHT HEART;  Surgeon: Juventino Bermudez Jr., MD;  Location: [facility] CATH LAB;  Service: Cardiology;  Laterality: Right;    RIGHT HEART CATHETERIZATION Right 11/20/2023    Procedure: INSERTION, CATHETER, RIGHT HEART;  Surgeon: Sajan Hurley MD;  Location: [facility] CATH LAB;  Service: Cardiology;  Laterality: Right;    RIGHT HEART CATHETERIZATION Right 1/22/2024    Procedure: INSERTION, CATHETER, RIGHT HEART;  Surgeon: Brayan Ocampo MD;  Location: [facility] CATH LAB;  Service: Cardiology;  Laterality: Right;    STERNAL WOUND CLOSURE N/A 12/4/2023    Procedure: CLOSURE, WOUND, STERNUM;  Surgeon: Yuri Washington MD;  [facility];  Service: Cardiovascular;  Laterality: N/A;    STERNOTOMY N/A 12/1/2023    Procedure: STERNOTOMY, REDO;  Surgeon: Yuri Washington MD;  [facility];  Service:  Cardiovascular;  Laterality: N/A;    VALVULOPLASTY, MITRAL VALVE N/A 12/1/2023    Procedure: VALVULOPLASTY, MITRAL VALVE;  Surgeon: Yuri Washington MD;  Location: [facility] OR [address] [place] [address];  Service: Cardiovascular;  Laterality: N/A;       Review of patient's allergies indicates:  No Known Allergies  Current Facility-Administered Medications   Medication Frequency    amiodarone tablet 400 mg Daily    amoxicillin capsule 500 mg Q12H    atorvastatin tablet 40 mg QHS    bumetanide injection 4 mg Q12H    famotidine tablet 20 mg Daily    folic acid tablet 1 mg Daily    LIDOcaine (PF) 10 mg/ml (1%) 10 mg/mL (1 %) injection     trazodone split tablet 25 mg Nightly PRN    venlafaxine tablet 37.5 mg Daily    vitamin D 1000 units tablet 1,000 Units Daily    warfarin (COUMADIN) tablet 2.5 mg Daily     Family History    None       Tobacco Use    Smoking status: Former     Current packs/day: 0.50     Types: Cigarettes    Smokeless tobacco: Not on file   Substance and Sexual Activity    Alcohol use: Yes     Comment: rarely    Drug use: No    Sexual activity: Not Currently     Partners: Female     Review of Systems   Constitutional:  Positive for fatigue. Negative for activity change, chills, diaphoresis and fever.   Respiratory:  Negative for cough, chest tightness, shortness of breath and wheezing.    Cardiovascular:  Negative for chest pain, palpitations and leg swelling.   Gastrointestinal:  Positive for abdominal distention. Negative for blood in stool, constipation, diarrhea, nausea and vomiting.   Endocrine: Negative for polyuria.   Genitourinary:  Positive for difficulty urinating. Negative for flank pain and hematuria.   Skin:  Negative for color change, pallor, rash and wound.   Neurological:  Negative for dizziness, speech difficulty, weakness, light-headedness, numbness and headaches.     Objective:     Vital Signs (Most Recent):  Temp: 98 °F (36.7 °C) (06/11/24 1802)  Pulse: 78 (06/12/24 0015)  Resp: 18 (06/12/24 0015)  BP: (!)  110/55 (06/12/24 0045)  SpO2: (!) 94 % (06/12/24 0015) Vital Signs (24h Range):  Temp:  [98 °F (36.7 °C)] 98 °F (36.7 °C)  Pulse:  [74-87] 78  Resp:  [16-24] 18  SpO2:  [94 %-99 %] 94 %  BP: ()/(0-55) 110/55     Weight: 81.2 kg (179 lb) (06/11/24 1549)  Body mass index is 23.62 kg/m².  Body surface area is 2.05 meters squared.    No intake/output data recorded.     Physical Exam  Vitals and nursing note reviewed.   Constitutional:       General: He is not in acute distress.     Appearance: Normal appearance. He is not ill-appearing or toxic-appearing.   HENT:      Head: Normocephalic and atraumatic.      Right Ear: External ear normal.      Left Ear: External ear normal.      Nose: Nose normal.      Mouth/Throat:      Mouth: Mucous membranes are moist.   Eyes:      Extraocular Movements: Extraocular movements intact.   Cardiovascular:      Rate and Rhythm: Normal rate and regular rhythm.      Pulses: Normal pulses.      Heart sounds: Normal heart sounds. No murmur heard.  Pulmonary:      Effort: Pulmonary effort is normal. No respiratory distress.      Breath sounds: Normal breath sounds. No wheezing or rales.   Chest:      Chest wall: No tenderness.   Abdominal:      General: Abdomen is flat. There is distension.      Palpations: Abdomen is soft.   Musculoskeletal:         General: No swelling or deformity. Normal range of motion.      Cervical back: Normal range of motion and neck supple. No rigidity or tenderness.      Right lower leg: No edema.      Left lower leg: No edema.      Comments: asterixis noted (patients states this is chronic for him and unchanged from his baseline)   Skin:     General: Skin is warm and dry.      Capillary Refill: Capillary refill takes 2 to 3 seconds.   Neurological:      General: No focal deficit present.      Mental Status: He is alert and oriented to person, place, and time.   Psychiatric:         Mood and Affect: Mood normal.         Behavior: Behavior normal.          Thought Content: Thought content normal.         Judgment: Judgment normal.          Significant Labs:  CBC:   Recent Labs   Lab 06/11/24  1605   WBC 7.04   RBC 2.20*   HGB 6.3*   HCT 19.8*      MCV 90   MCH 28.6   MCHC 31.8*     CMP:   Recent Labs   Lab 06/11/24  1605 06/11/24  2227   GLU 84 38*   CALCIUM 8.7 9.1   ALBUMIN 2.6*  --    PROT 7.6  --     139   K 4.1 4.2   CO2 20* 21*   CL 99 100   * 117*   CREATININE 9.9* 9.8*   ALKPHOS 142*  --    ALT 59*  --    AST 58*  --    BILITOT 0.2  --      All labs within the past 24 hours have been reviewed.    Significant Imaging:  US: Reviewed  CT: Reviewed

## 2024-06-12 NOTE — ASSESSMENT & PLAN NOTE
Chronic with no acute bleeding, multifactorial 2/2 CKD4 and chronic anticoagulation    -Hgb 6.3, normally in the 6's - 7's  -Iron panel done. Iron deficient but Ferritin is elevated at 1186  -Continue to trend

## 2024-06-12 NOTE — ASSESSMENT & PLAN NOTE
Chronic with no acute bleeding, multifactorial 2/2 CKD4 and chronic anticoagulation    -Will recheck iron levels

## 2024-06-12 NOTE — PROGRESS NOTES
Admit Note     Heart Transplant  spoke with the pt's spouse in order to assess needs.  Pt's spouse reports due to the pt's current medical needs he is not able to complete admit assessment.  Pt and spouse are familiar with the role of the  from past admissions.  Pt's spouse agreed to complete assessment by phone.      Patient is a 64 y.o.  male, admitted due to acute on chronic combined systolic and diastolic heart failure, type 2 diabetes, paroxysmal atrial fibrillation, anemia, acute renal failure superimposed on stage 4 chronic kidney disease, hypoglycemia, bacteremia due to enterococcus and coronary artery disease.   Pt received an LVAD on 12/1/23, pt's spouse assists with dressing dressing changes.  Spouse reports the pt is starting dialysis today and may need dialysis when discharged. Spouse reports if indicated she would like dialysis to be arranged in [place].   Pt's spouse also reports she feels the pt needs to go to rehab when discharged due weakness and limited ambulation.    Worker explained possible roadblocks with this request, but  will discuss discharge planning request with team.       Patient admitted to [facility] on 6/11/2024 .  At this time, patient presents as oriented, but very tired  At this time, patients caregiver presents as alert and oriented x 4, calm, and communicative.      Household/Family Systems (as reported by patients caregiver)     Patient resides with patient's spouse, at     [address] [facility] [address].      Support system includes spouse, siblings and adult children.    Patient does not have dependents that are need of being cared for.     Patients primary caregiver is spouse, Arlyn Abbott and pt's two sisters when spouse is at work.     Pt's cell: [phone]    Emergency contacts:   Arlyn Abbott (spouse, works full time, but has FMLA, drives) [phone]  Teetee Abbott Winnie (sister, helps to care for pt when spouse is  at work) [phone] or [phone]    During admission, patient's caregiver plans to stay in patient's room.  Confirmed patient and patients caregivers do have access to reliable transportation.    Cognitive Status/Learning     Patients caregiver reports patients reading ability as 12th grade and states patient does have difficulty with comprehension and memory due to current medical needs.  Pt does not have difficulty with eye sight and hearing.  On past admissions pt reported no cognitive concerns.   Patients caregiver reports patient learns best by one on one support.     Needed: No.   Highest education level: High School (9-12) or GED    Vocation/Disability (as reported by patients caregiver)    Working for Income: No  If no, reason not working: Disability  Patient started disability in 2009 due to his heart.   Pt's spouse works at [employer] as a patient access rep.   Pt's spouse did not report financial concerns at this time.     Adherence     Patients caregiver reports patient has a medium level of adherence to patients health care regimen. Pt's spouse reports she assists pt with MD and RX needs.  Souse reports the pt has not been eating very much.    Adherence counseling and education provided.  Patient's caregiver verbalizes understanding.    Substance Use    Patients caregiver reports patients substance usage as the following:    Tobacco: no current use.  Last use 9/2023.  Alcohol: none, patient denies any use.  Illicit Drugs/Non-prescribed Medications: none, patient denies any use.  Patients caregiver states clear understanding of the potential impact of substance use.  Substance abstinence/cessation counseling, education and resources provided and reviewed.     Services Utilizing/ADLS (as reported by patients caregiver)    Infusion Service: Prior to admission, patient utilizing? no Pt has had IV ABX in the past with [facility]  Home Health: Prior to admission, patient  utilizing? yes Pt has HH with [facility] for weekly nursing and Physical Therapy. [phone].   Spouse reports she would like to use same HH if discharged to home .   DME: Prior to admission, yes grab bars, Rolater, BSC and walker.   Pulmonary/Cardiac Rehab: Prior to admission, no  Dialysis:  Prior to admission, no Pt may require same when discharged.   Transplant Specialty Pharmacy:  Prior to admission, no.    Prior to admission, patients caregiver reports patient was not independent with ADLS and was not driving. Pt's spouse reports the pt was sleeping all night and for most of the day. Pt requires assistance with dressing, cooking and bathing.    Patients caregiver reports patient is not able to care for self at this time due to compromised medical condition (as documented in medical record) and physical weakness..  Patients caregiver reports patient indicates a willingness to care for self once medically cleared to do so.    Insurance/Medications    Insured by   Payer/Plan Subscr  Sex Relation Sub. Ins. ID Effective Group Num   1. MEDICAID - ALISON BUCK 1960 Male Self [id number] [address] [facility]                                   [address] [address]   2[facility] 1960 Male Self [id number] 10/9/23                                    PO       Primary Insurance (for UNOS reporting): Public Insurance - Medicaid  Secondary Insurance (for UNOS reporting): None    Patients caregiver reports patient is able to obtain and afford medications at this time and at time of discharge.    Living Will/Healthcare Power of     Patients caregiver reports patient has a LW and/or HCPA.   provided education regarding LW and HCPA and the completion of forms.    Coping/Mental Health (as reported by patients caregiver)    Pt's spouse reports the pt is coping adequately with hospital stay.  Spouse reports pt has difficulty with depression (medical record states adjustment  disorder with depressed mood) and is taking medication for same.  Pt's spouse reports concern for the pt's medical needs and worker provided active listening and encouragement.          Discharge Planning (as reported by patients caregiver)    At this time of discharge, patient's discharge plant is not clear.  Pt's spouse will continue to care for the pt as needed.  plans to return Patients spouse will transport patient.  Per rounds today, expected discharge date has not been medically determined at this time. Patients caretaker verbalizes understanding and is involved in treatment planning and discharge process.    Additional Concerns    Patient is being followed for needs, education, resources, information, emotional support, supportive counseling, and for supportive and skilled discharge plan of care.  Transplant Social Workers will continue to follow.

## 2024-06-12 NOTE — HPI
Reason for Consult: Management of T2DM, Hyperglycemia     Surgical Procedure and Date: HM3 placement as DT (12/1/23)     Diabetes diagnosis year: several years ago    Home Diabetes Medications:  Amaryl 1 mg (recently prescribed by PCP about a week ago)     Lab Results   Component Value Date    HGBA1C 7.4 (H) 04/17/2024       How often checking glucose at home? Once daily in the AM ( prior to starting Amaryl BG in the 300s, since starting Amaryl BG 70-130s)   BG readings on regimen: 70-130s  Hypoglycemia on the regimen?  No  Missed doses on regimen?  No    Diabetes Complications include:     Hypoglycemia , Diabetic nephropathy  , Diabetic chronic kidney disease     , and Diabetic peripheral neuropathy     Complicating diabetes co morbidities:   CHF and CKD      HPI:   Patient is a 64 y.o. male with a diagnosis of stage D HFrEF, ICMP who underwent HM3 placement as DT (12/1/23) w/ a hospital stay complicated by vasoplegia(requiring Giaprezza), debility, malnutrition/impaired swallowing, and renal failure requiring HD (since weaned off) w/ recent discharge for ADHF presented to the ED with wife due to c/o worsening generalized weakness since 6/10. Per wife patient was mostly sleeping during the day despite sleeping 10 hrs at night. Patient also admits decreased appetite and decreased urine output despite taking his medications as prescribed. He has chronic shortness of breath on exertion. Denies confusion, syncope, diarrhea, constipation, N/V, dysuria, or other complaints. Patient had his prior tunnel cath removed used for HD about 2 weeks ago. He completed a 6 week therapy for Enterococcus bacteremia with ampicillin and rocephin ending 5/30/24 and was switched to PO antibiotics. Off note, patient was recently started by his PCP on glimepiride 1mg and unclear of BG trends during the day. Per patient last fasting  on 6/11. Patient reports lasting taking the glimepiride on 6/11 AM.   Admitted for worsening renal  function with a BUN>100/Cr. 9.9. Endocrinology consulted for management of T2DM.

## 2024-06-12 NOTE — ASSESSMENT & PLAN NOTE
Patient with a baseline eGFR that appears to be variable however was noted to be in the 15-20 range in February before his recent IVÁN in May, presents now with fatigue and worsening IVÁN with a creatinine of 9.8 and eGFR of 5.5. BUN was noted to be elevated at 117, other electrolytes were normal. He was noted to have some asterixis on exam (which he states is chronic for him and unchanged from his baseline), however denies any other signs or symptoms of uremia. Bedside bladder scan revealed 200 mL of urine in his bladder and his last bladder void was 10 hours prior to this consult.     Recommendations  -No acute indication for dialysis at this time since his electrolytes appear stable, he is on room air, denies respiratory symptoms, and no new evidence of uremia (dialysis consent was obtained and placed in his chart on 6/12/24 at 0025)  -Recommend goodman placement to decompress the bladder  -Please obtain a urine sample for urine microscopy  -Agree with current diuresis medications  -Avoid nephrotoxic agents (IV contrast, NSAID's, gadolinium contrast, PPI's) if able.   -Strict I's and O's  -Daily renal function panel  -Renally dose all medications

## 2024-06-12 NOTE — SUBJECTIVE & OBJECTIVE
Interval History: Lethargic, + asterixis, CVP 23 earlier this morning - transitioned IVP Bumex to Bumex 2 mg/hr with increase in UOP from ~ 25 cc/hr to 50 cc/hr. CVP continues to climb, however. Appreciate Nephrology's help. Plan to initiate intermittent HD ASAP ( trialysis line placed overnight). Per patient's wife, he has been sleeping ~ 20 hours a day for the past 2 weeks. Glucoses have finally normalized with D10W at 75 cc/hr - appreciate Endocrine's help - will titrate D10W to keep glucoses > 80.     Continuous Infusions:   bumetanide (BUMEX) 25 mg in 100 mL infusion (conc: 0.25 mg/mL)  2 mg/hr Intravenous Continuous 8 mL/hr at 06/12/24 1101 2 mg/hr at 06/12/24 1101    dextrose 10 % in water (D10W)   Intravenous Continuous 75 mL/hr at 06/12/24 1101 Rate Verify at 06/12/24 1101     Scheduled Meds:   amiodarone  400 mg Oral Daily    amoxicillin  500 mg Oral Q12H    atorvastatin  40 mg Oral QHS    folic acid  1 mg Oral Daily    hydrALAZINE  100 mg Oral Q8H    pantoprazole  40 mg Oral Daily    venlafaxine  37.5 mg Oral Daily    vitamin D  1,000 Units Oral Daily     PRN Meds:  Current Facility-Administered Medications:     acetaminophen, 650 mg, Oral, Q6H PRN    dextrose 10%, 12.5 g, Intravenous, PRN    dextrose 10%, 12.5 g, Intravenous, PRN    dextrose 10%, 25 g, Intravenous, PRN    dextrose 10%, 25 g, Intravenous, PRN    glucagon (human recombinant), 1 mg, Intramuscular, PRN    glucagon (human recombinant), 1 mg, Intramuscular, PRN    glucose, 16 g, Oral, PRN    glucose, 16 g, Oral, PRN    glucose, 24 g, Oral, PRN    glucose, 24 g, Oral, PRN    traZODone, 25 mg, Oral, Nightly PRN    Review of patient's allergies indicates:  No Known Allergies  Objective:     Vital Signs (Most Recent):  Temp: 97.5 °F (36.4 °C) (06/12/24 0701)  Pulse: 78 (06/12/24 0801)  Resp: 20 (06/12/24 0801)  BP: 123/79 (06/12/24 0801)  SpO2: 98 % (06/12/24 0801) Vital Signs (24h Range):  Temp:  [97.5 °F (36.4 °C)-98 °F (36.7 °C)] 97.5 °F (36.4  °C)  Pulse:  [68-87] 78  Resp:  [13-24] 20  SpO2:  [93 %-100 %] 98 %  BP: ()/(0-89) 123/79  Arterial Line BP: ()/(66-84) 110/84     Patient Vitals for the past 72 hrs (Last 3 readings):   Weight   06/12/24 0401 80.5 kg (177 lb 7.5 oz)   06/11/24 1549 81.2 kg (179 lb)     Body mass index is 23.41 kg/m².      Intake/Output Summary (Last 24 hours) at 6/12/2024 1234  Last data filed at 6/12/2024 1101  Gross per 24 hour   Intake 510.95 ml   Output 535 ml   Net -24.05 ml       Hemodynamic Parameters:       Telemetry: SR       Physical Exam  Constitutional:       Comments: Lethargic, arouses to voice. Oriented X 3   HENT:      Head: Normocephalic and atraumatic.   Eyes:      Conjunctiva/sclera: Conjunctivae normal.      Pupils: Pupils are equal, round, and reactive to light.   Neck:      Comments: RIJ trialysis cath. JVP at earlobe  Cardiovascular:      Rate and Rhythm: Normal rate and regular rhythm.      Comments: Smooth VAD hum  Pulmonary:      Effort: Pulmonary effort is normal.      Breath sounds: Normal breath sounds.   Abdominal:      General: Bowel sounds are normal.      Palpations: Abdomen is soft.   Musculoskeletal:         General: No swelling. Normal range of motion.      Cervical back: Normal range of motion and neck supple.   Skin:     General: Skin is warm and dry.      Capillary Refill: Capillary refill takes 2 to 3 seconds.   Neurological:      Comments: Lethargic, arouses to voice. Oriented X 3            Significant Labs:  CBC:  Recent Labs   Lab 06/11/24  1605 06/12/24  0345   WBC 7.04 6.63   RBC 2.20* 2.26*   HGB 6.3* 6.3*   HCT 19.8* 20.5*    319   MCV 90 91   MCH 28.6 27.9   MCHC 31.8* 30.7*     BNP:  Recent Labs   Lab 06/11/24  1933   BNP 1,388*     CMP:  Recent Labs   Lab 06/11/24  1605 06/11/24  2227 06/12/24  0345   GLU 84 38* 32*   CALCIUM 8.7 9.1 8.9   ALBUMIN 2.6*  --  2.5*   PROT 7.6  --  7.5    139 136   K 4.1 4.2 3.9   CO2 20* 21* 20*   CL 99 100 99   * 117*  114*   CREATININE 9.9* 9.8* 9.5*   ALKPHOS 142*  --  148*   ALT 59*  --  67*   AST 58*  --  73*   BILITOT 0.2  --  0.2      Coagulation:   Recent Labs   Lab 06/07/24  0000 06/11/24  1933 06/12/24 0345   INR 2.6 2.4* 2.4*   APTT  --  46.5* 45.2*     LDH:  Recent Labs   Lab 06/12/24 0345   *     Microbiology:  Microbiology Results (last 7 days)       Procedure Component Value Units Date/Time    Blood culture #1 **CANNOT BE ORDERED STAT** [[id number]] Collected: 06/11/24 2122    Order Status: Completed Specimen: Blood from Peripheral, Antecubital, Right Updated: 06/12/24 0345     Blood Culture, Routine No Growth to date    Blood culture #2 **CANNOT BE ORDERED STAT** [[id number]] Collected: 06/11/24 2121    Order Status: Completed Specimen: Blood from Peripheral, Hand, Left Updated: 06/12/24 0345     Blood Culture, Routine No Growth to date            I have reviewed all pertinent labs within the past 24 hours.    Estimated Creatinine Clearance: 9.1 mL/min (A) (based on SCr of 9.5 mg/dL (H)).    Diagnostic Results:  I have reviewed and interpreted all pertinent imaging results/findings within the past 24 hours.

## 2024-06-12 NOTE — PROCEDURES
"Radha Abbott is a 64 y.o. male patient.    Temp: 97.8 °F (36.6 °C) (06/12/24 0015)  Pulse: 78 (06/12/24 0015)  Resp: 15 (06/12/24 0101)  BP: (!) 78/0 (06/12/24 0101)  SpO2: 98 % (06/12/24 0101)  Weight: 81.2 kg (179 lb) (06/11/24 1549)  Height: 6' 1" (185.4 cm) (06/11/24 1549)    Central Line    Date/Time: 6/12/2024 2:51 AM    Performed by: Bernardino Green MD  Authorized by: Bernardino Green MD    Location procedure was performed:  [facility] CARDIAC ICU  Pre-operative diagnosis:  Acute renal failure  Post-operative diagnosis:  Acute renal failure  Consent Done ?:  Yes  Time out complete?: Verified correct patient, procedure, equipment, staff, and site/side    Indications:  Hemodynamic monitoring and hemodialysis  Anesthesia:  Local infiltration  Local anesthetic:  Lidocaine 1% without epinephrine  Preparation:  Skin prepped with ChloraPrep  Skin prep agent dried: Skin prep agent completely dried prior to procedure    Sterile barriers: All five maximal sterile barriers used - gloves, gown, cap, mask and large sterile sheet    Hand hygiene: Hand hygiene performed immediately prior to central venous catheter insertion    Location:  Right internal jugular  Catheter type:  Trialysis  Catheter size:  7 Fr  Ultrasound guidance: Yes    Vessel Caliber:  Medium   patent  Comprressibility:  Normal  Doppler:  Not done  Needle advanced into vessel with real time ultrasound guidance.    Guidewire confirmed in vessel.    Manometry: No    Number of attempts:  1  Securement:  Line sutured, chlorhexidine patch, sterile dressing applied and blood return through all ports  Complications: No    Specimens: No    Implants: No    XRay:  Placement verified by x-ray, no pneumothorax on x-ray, tip termination and successful placement  Adverse Events:  NoneTermination Site: subclavian          6/12/2024     2:01 AM 6/12/2024     1:00 AM 6/12/2024    12:10 AM 6/11/2024     7:35 PM 5/31/2024    12:48 PM 5/31/2024     8:03 AM 5/31/2024 "     5:00 AM   TXP LVAD INTERROGATIONS   Type HeartMate3 HeartMate3 HeartMate3 HeartMate3 HeartMate3 HeartMate3 HeartMate3   Flow 4 4.1 4.4 4.6 4.3 3.9 3.9   Speed 5000 5000 5000 5000 5000 5050 5050   PI 6.3 5.6 3.7 2.8 3.7 4.5 4.6   Power (Carrington) 3.4 3.4 3.4 3.5 3.5 3.5 3.3   LSL      4600 4600   Pulsatility Intermittent pulse   Intermittent pulse Intermittent pulse Pulse Intermittent pulse         6/12/2024

## 2024-06-12 NOTE — ASSESSMENT & PLAN NOTE
BG goal 140-180  PT w/ stage D HFrEF, ICMP who underwent HM3 placement as DT, DM2, CKD4, HTN, E.faecalis bacteremia who presented due to c/o worsening generalized weakness since 6/10.  Admitted for worsening renal function with a BUN>100/Cr. 9.9.     Recently prescribed Glimepiride about a week ago. Last took on 6/11. Since starting BG has trended down significantly from 300s to 70-130s at home per family. Patient now having hypoglycemia inpatient. Given worsening kidney function likely cause.     Plan:  -BG monitoring ac/hs/0200  -Hypoglycemia protocol in place  -Recommend continuing D10 infusion titrate to keep BG >80    ** Please call Endocrine for any BG related issues **    Discharge plans:   TBD  -D/C Amaryl

## 2024-06-12 NOTE — SUBJECTIVE & OBJECTIVE
Interval HPI:   Overnight events: Remains in CICU. BG mostly below goal ranges with hypoglycemia noted. Receiving hypoglcemia treatment. D10 infusion ordered. Creatinine 9.5. Appetite poor. Diet Renal Coumadin Restriction; Fluid - 2000mL    Eatin%  Nausea: No  Hypoglycemia and intervention: Yes  Fever: No  TPN and/or TF: No  If yes, type of TF/TPN and rate: n/a    PMH, PSH, FH, SH reviewed     ROS:  Constitutional: Negative for weight changes.  Eyes: Negative for visual disturbance.  Respiratory: Negative for cough.   Cardiovascular: Negative for chest pain.  Gastrointestinal: Negative for nausea.  Endocrine: Negative for polyuria, polydipsia.  Musculoskeletal: Negative for back pain.  Skin: Negative for rash.  Neurological: Positive for weakness.   Psychiatric/Behavioral: Negative for depression.    Review of Systems    Current Medications and/or Treatments Impacting Glycemic Control  Immunotherapy:    Immunosuppressants       None          Steroids:   Hormones (From admission, onward)      None          Pressors:    Autonomic Drugs (From admission, onward)      None          Hyperglycemia/Diabetes Medications:   Antihyperglycemics (From admission, onward)      None             PHYSICAL EXAMINATION:  Vitals:    24 0801   BP: 123/79   Pulse: 78   Resp: 20   Temp:      Body mass index is 23.41 kg/m².     Physical Exam   Constitutional: Well developed, well nourished, NAD.  ENT: External ears no masses with nose patent; normal hearing.  Neck: Supple; trachea midline.  Cardiovascular: Normal heart sounds; VAD hum.   Lungs: Normal effort; lungs anterior bilaterally clear to auscultation.  Abdomen: Soft, no masses, no hernias. Drive line site clean.   MS: No clubbing or cyanosis of nails noted; unable to assess gait.  Skin: No rashes, lesions, or ulcers; no nodules.   Psychiatric: Good judgement and insight; normal mood and affect.  Neurological: Cranial nerves are grossly intact.   Foot: Nails in good  condition, no amputations noted.

## 2024-06-12 NOTE — SUBJECTIVE & OBJECTIVE
Past Medical History:   Diagnosis Date    CAD (coronary artery disease)     CHF (congestive heart failure)     Diabetes mellitus     HFrEF (heart failure with reduced ejection fraction)     ICD (implantable cardioverter-defibrillator) in place     MI, old        Past Surgical History:   Procedure Laterality Date    ANGIOPLASTY-VENOUS ARTERY Right 12/1/2023    Procedure: ANGIOPLASTY-VENOUS ARTERY, RIGHT FEMORAL;  Surgeon: Yuri Washington MD;  [facility];  Service: Cardiovascular;  Laterality: Right;    AORTIC VALVULOPLASTY N/A 12/1/2023    Procedure: REPAIR, AORTIC VALVE;  Surgeon: Yuri Washington MD;  [facility];  Service: Cardiovascular;  Laterality: N/A;    CARDIAC SURGERY      CLOSURE N/A 12/1/2023    Procedure: CLOSURE, TEMPORARY;  Surgeon: Yuri Washington MD;  [facility];  Service: Cardiovascular;  Laterality: N/A;    DRAINAGE OF PLEURAL EFFUSION  12/4/2023    Procedure: DRAINAGE, PLEURAL EFFUSION;  Surgeon: Yuri Washington MD;  [facility];  Service: Cardiovascular;;    INSERTION OF GRAFT TO PERICARDIUM  12/4/2023    Procedure: INSERTION, GRAFT, PERICARDIUM;  Surgeon: Yuri Washington MD;  Location: [facility] OR [address] [place] [address];  Service: Cardiovascular;;    INSERTION OF INTRA-AORTIC BALLOON ASSIST DEVICE Right 11/21/2023    Procedure: INSERTION, INTRA-AORTIC BALLOON PUMP;  Surgeon: Finn Conh MD;  Location: [facility] CATH LAB;  Service: Cardiology;  Laterality: Right;    LEFT VENTRICULAR ASSIST DEVICE Left 12/1/2023    Procedure: INSERTION-LEFT VENTRICULAR ASSIST DEVICE;  Surgeon: Yuri Washington MD;  Location: [facility] OR [address] [place] [address];  Service: Cardiovascular;  Laterality: Left;  REDO STERNOTOMY - REDO SAW NEEDED FOR CASE    LYSIS OF ADHESIONS  12/1/2023    Procedure: LYSIS, ADHESIONS;  Surgeon: Yuri Washington MD;  Location: [facility] OR [address] [place] [address];  Service: Cardiovascular;;    PLACEMENT OF SWAN ROLANDO CATHETER WITH IMAGING GUIDANCE  11/20/2023    Procedure: INSERTION,  CATHETER, SWAN-ROLANDO, WITH IMAGING GUIDANCE;  Surgeon: Sajan Hurley MD;  Location: [facility] CATH LAB;  Service: Cardiology;;    REMOVAL OF TUNNELED CENTRAL VENOUS CATHETER (CVC) N/A 3/1/2024    Procedure: REMOVAL, CATHETER, CENTRAL VENOUS, TUNNELED;  Surgeon: Seble Aguilar MD;  Location: [facility] CATH LAB;  Service: Interventional Nephrology;  Laterality: N/A;    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 12/1/2023    Procedure: REPAIR, ANEURYSM, ARTERY, FEMORAL;  Surgeon: Yuri Washington MD;  [facility];  Service: Cardiovascular;  Laterality: Right;  Right Femoral Artery Repair    RIGHT HEART CATHETERIZATION Right 10/10/2023    Procedure: INSERTION, CATHETER, RIGHT HEART;  Surgeon: Bin Gandhi MD;  [facility] C[facility] LAB;  Service: Cardiology;  Laterality: Right;    RIGHT HEART CATHETERIZATION Right 10/13/2023    Procedure: INSERTION, CATHETER, RIGHT HEART;  Surgeon: Walter Mcintyre MD;  Location: [facility] CATH LAB;  Service: Cardiology;  Laterality: Right;    RIGHT HEART CATHETERIZATION  11/13/2023    RIGHT HEART CATHETERIZATION Right 11/13/2023    Procedure: INSERTION, CATHETER, RIGHT HEART;  Surgeon: Juventino Bermudez Jr., MD;  Location: [facility] CATH LAB;  Service: Cardiology;  Laterality: Right;    RIGHT HEART CATHETERIZATION Right 11/20/2023    Procedure: INSERTION, CATHETER, RIGHT HEART;  Surgeon: Sajan Hurley MD;  Location: [facility] CATH LAB;  Service: Cardiology;  Laterality: Right;    RIGHT HEART CATHETERIZATION Right 1/22/2024    Procedure: INSERTION, CATHETER, RIGHT HEART;  Surgeon: rBayan Ocampo MD;  Location: [facility] CATH LAB;  Service: Cardiology;  Laterality: Right;    STERNAL WOUND CLOSURE N/A 12/4/2023    Procedure: CLOSURE, WOUND, STERNUM;  Surgeon: Yuri Washington MD;  [facility];  Service: Cardiovascular;  Laterality: N/A;    STERNOTOMY N/A 12/1/2023    Procedure: STERNOTOMY, REDO;  Surgeon: Yuri Washington MD;  [facility];  Service:  Cardiovascular;  Laterality: N/A;    VALVULOPLASTY, MITRAL VALVE N/A 12/1/2023    Procedure: VALVULOPLASTY, MITRAL VALVE;  Surgeon: Yuri Washington MD;  Location: [facility] OR [address] [place] [address];  Service: Cardiovascular;  Laterality: N/A;       Review of patient's allergies indicates:  No Known Allergies    Current Facility-Administered Medications   Medication    amiodarone tablet 400 mg    amoxicillin capsule 500 mg    atorvastatin tablet 40 mg    bumetanide injection 4 mg    famotidine tablet 20 mg    folic acid tablet 1 mg    trazodone split tablet 25 mg    venlafaxine tablet 37.5 mg    vitamin D 1000 units tablet 1,000 Units    warfarin (COUMADIN) tablet 2.5 mg     Family History    None       Tobacco Use    Smoking status: Former     Current packs/day: 0.50     Types: Cigarettes    Smokeless tobacco: Not on file   Substance and Sexual Activity    Alcohol use: Yes     Comment: rarely    Drug use: No    Sexual activity: Not Currently     Partners: Female     Review of Systems   Constitutional:  Negative for chills and diaphoresis.   Respiratory:  Positive for shortness of breath. Negative for apnea, cough and chest tightness.    Cardiovascular:  Negative for chest pain, palpitations and leg swelling.   Gastrointestinal:  Negative for abdominal distention, abdominal pain, blood in stool, constipation, rectal pain and vomiting.   Genitourinary:  Negative for dysuria.   Musculoskeletal:  Negative for back pain.   Neurological:  Negative for dizziness, light-headedness and headaches.   Psychiatric/Behavioral:  The patient is not nervous/anxious.    Objective:     Vital Signs (Most Recent):  Temp: 97.8 °F (36.6 °C) (06/12/24 0015)  Pulse: 78 (06/12/24 0015)  Resp: 15 (06/12/24 0101)  BP: (!) 78/0 (06/12/24 0101)  SpO2: 98 % (06/12/24 0101) Vital Signs (24h Range):  Temp:  [97.8 °F (36.6 °C)-98 °F (36.7 °C)] 97.8 °F (36.6 °C)  Pulse:  [74-87] 78  Resp:  [15-24] 15  SpO2:  [94 %-99 %] 98 %  BP: ()/(0-55) 78/0     Patient Vitals  for the past 72 hrs (Last 3 readings):   Weight   06/11/24 1549 81.2 kg (179 lb)     Body mass index is 23.62 kg/m².    No intake or output data in the 24 hours ending 06/12/24 0221       Physical Exam  Vitals and nursing note reviewed.   Constitutional:       General: He is not in acute distress.     Appearance: He is not ill-appearing, toxic-appearing or diaphoretic.   HENT:      Head: Normocephalic and atraumatic.      Nose: Nose normal.      Mouth/Throat:      Mouth: Mucous membranes are moist.      Pharynx: Oropharynx is clear.   Cardiovascular:      Rate and Rhythm: Normal rate and regular rhythm.      Comments: Vad hum. Decreased BL radial pulses and BL LE  Pulmonary:      Effort: Pulmonary effort is normal. No respiratory distress.      Comments: Decreased lung sounds BL  Abdominal:      General: Abdomen is flat. There is no distension.      Tenderness: There is no abdominal tenderness.      Comments: Driveline site clean   Musculoskeletal:         General: No swelling or tenderness. Normal range of motion.      Cervical back: Normal range of motion and neck supple.   Skin:     General: Skin is warm and dry.      Capillary Refill: Capillary refill takes 2 to 3 seconds.      Coloration: Skin is pale.   Neurological:      General: No focal deficit present.      Mental Status: He is alert and oriented to person, place, and time.      Comments: tremor   Psychiatric:         Mood and Affect: Mood normal.         Thought Content: Thought content normal.        Significant Labs:  CBC:  Recent Labs   Lab 06/11/24  1605   WBC 7.04   RBC 2.20*   HGB 6.3*   HCT 19.8*      MCV 90   MCH 28.6   MCHC 31.8*     BNP:  Recent Labs   Lab 06/11/24  1933   BNP 1,388*     CMP:  Recent Labs   Lab 06/11/24  1605 06/11/24  2227   GLU 84 38*   CALCIUM 8.7 9.1   ALBUMIN 2.6*  --    PROT 7.6  --     139   K 4.1 4.2   CO2 20* 21*   CL 99 100   * 117*   CREATININE 9.9* 9.8*   ALKPHOS 142*  --    ALT 59*  --    AST 58*  " --    BILITOT 0.2  --       Coagulation:   Recent Labs   Lab 06/07/24  0000 06/11/24 1933   INR 2.6 2.4*   APTT  --  46.5*     LDH:  No results for input(s): "LDH" in the last 72 hours.  Microbiology:  Microbiology Results (last 7 days)       Procedure Component Value Units Date/Time    Blood culture #1 **CANNOT BE ORDERED STAT** [[id number]] Collected: 06/11/24 2122    Order Status: Sent Specimen: Blood from Peripheral, Antecubital, Right Updated: 06/11/24 2132    Blood culture #2 **CANNOT BE ORDERED STAT** [[id number]] Collected: 06/11/24 2121    Order Status: Sent Specimen: Blood from Peripheral, Hand, Left Updated: 06/11/24 2132            I have reviewed all pertinent labs within the past 24 hours.    Diagnostic Results:  I have reviewed all pertinent imaging results/findings within the past 24 hours.    "

## 2024-06-12 NOTE — PROGRESS NOTES
06/12/24 1617   Treatment   Treatment Type SLED   Treatment Status New start   Dialysis Machine Number [id number]   Dialyzer Time (hours) 0   BVP (Liters) 0 L   Solutions Labeled and Current  Yes   Access Temporary Cath;Right;IJ   Catheter Dressing Intact  Yes   Alarms Engaged Yes   CRRT Comments sled initiated as ordered   Prescription   Time (Hours) Other  (4)   Dialysate K + (mEq/L) 4   Dialysate CA + (mEq/L) 2.25   Dialysate HCO3 - (Bicarb) (mEq/L) 25   Dialysate Na + (mEq/L) 135   Cartridge Type Other  (r300)   Dialysate Flow Rate (mL/min) 100   UF Goal Rate 450 mL/hr   CRRT Hourly Documentation   Blood Flow (mL/min) 150   UF Rate 200 cc/hr   Arterial Pressure (mmHg) -20 mmHg   Venous Pressure (mmHg) 40 mmHg   Effluent Pressure (EP) (mmHg) 30 mmHg   Total UF (Hourly Cleared) (mL) 0     SLED initiated as ordered. RIJ CVC aspirated, flushed, and accessed using aseptic technique. Lines connected and secured.

## 2024-06-12 NOTE — EICU
Intervention Initiated From:  Bedside    Theo intervened regarding:  Time-Out      Comments: elert from bedside staff for timeout for trialysis line placement per Dr. Alonzo Brand. See timeout flowsheet.   01:37-Pt tolerated procedure well.

## 2024-06-12 NOTE — PROGRESS NOTES
06/12/2024  Monet Aguiar    Current provider:  Brayan Ocampo MD    Device interrogation:      6/12/2024     5:01 AM 6/12/2024     4:01 AM 6/12/2024     3:01 AM 6/12/2024     2:01 AM 6/12/2024     1:00 AM 6/12/2024    12:10 AM 6/11/2024     7:35 PM   TXP LVAD INTERROGATIONS   Type HeartMate3 HeartMate3 HeartMate3 HeartMate3 HeartMate3 HeartMate3 HeartMate3   Flow 4.1 4.2 4 4 4.1 4.4 4.6   Speed 5000 5000 5000 5000 5000 5000 5000   PI 4.8 4.2 5.3 6.3 5.6 3.7 2.8   Power (Carrington) 3.4 3.4 3.3 3.4 3.4 3.4 3.5   Pulsatility Intermittent pulse Intermittent pulse Intermittent pulse Intermittent pulse   Intermittent pulse          Rounded on [facility] to ensure all mechanical assist device settings (IABP or VAD) were appropriate and all parameters were within limits.  I was able to ensure all back up equipment was present, the staff had no issues, and the Perfusion Department daily rounding was complete.      For implantable VADs: Interrogation of Ventricular assist device was performed with analysis of device parameters and review of device function. I have personally reviewed the interrogation findings and agree with findings as stated.     In emergency, the nursing units have been notified to contact the perfusion department either by:  Calling [phone] from 630am to 4pm Mon thru Fri, utilizing the On-Call Finder functionality of Epic and searching for Perfusion, or by contacting the hospital  from 4pm to 630am and on weekends and asking to speak with the perfusionist on call.    11:11 AM

## 2024-06-12 NOTE — PLAN OF CARE
Cardiac ICU Care Plan    POC reviewed with Radha Abbott and family. Questions and concerns addressed. No acute events today. Pt progressing toward goals. Will continue to monitor. See below and flowsheets for full assessment and VS info.   CVP: 18  Svo2: 50     Pt hypoglycemic to the 30s despite PO intake. D10 gtt ordered by MD Sunday and initiated.   D10 gtt increased to 50 mL/hr per MD Sunday due to pt continuing to be hypoglycemic.    Neuro:  Michelle Coma Scale  Best Eye Response: 3-->(E3) to speech  Best Motor Response: 6-->(M6) obeys commands  Best Verbal Response: 5-->(V5) oriented  Michelle Coma Scale Score: 14  Assessment Qualifiers: patient not sedated/intubated  Pupil PERRLA: yes    24 hr Temp:  [97.6 °F (36.4 °C)-98 °F (36.7 °C)]      CV:  Rhythm: normal sinus rhythm   DVT prophylaxis: VTE Required Core Measure: Pharmacological prophylaxis initiated/maintained                    Type: HeartMate3       Pulses  Right Radial Pulse: 1+ (weak)  Left Radial Pulse: 1+ (weak)  Right Dorsalis Pedis Pulse: 1+ (weak)  Left Dorsalis Pedis Pulse: 1+ (weak)  Right Posterior Tibial Pulse: 1+ (weak)  Left Posterior Tibial Pulse: 1+ (weak)    Resp:          GI/:  GI prophylaxis: yes     Last Bowel Movement: 06/11/24  Voiding Characteristics: urethral catheter (bladder)       Urethral Catheter 06/12/24 0300 16 Fr.-Reason for Continuing Urinary Catheterization: Critically ill in ICU and requiring hourly monitoring of intake/output   Intake/Output Summary (Last 24 hours) at 6/12/2024 0641  Last data filed at 6/12/2024 0601  Gross per 24 hour   Intake 236 ml   Output 335 ml   Net -99 ml        Nutritional Supplement Intake: Quantity 0, Type: n/a    Labs/Accuchecks:  Recent Labs   Lab 06/11/24  1605 06/12/24  0345   WBC 7.04 6.63   RBC 2.20* 2.26*   HGB 6.3* 6.3*   HCT 19.8* 20.5*    319      Recent Labs   Lab 06/07/24  0000 06/11/24  1933 06/12/24  0345   INR 2.6 2.4* 2.4*   APTT  --  46.5* 45.2*      Recent Labs      06/12/24  0345      K 3.9   CO2 20*   CL 99   *   CREATININE 9.5*   ALKPHOS 148*   ALT 67*   AST 73*   BILITOT 0.2       Recent Labs   Lab 06/11/24  1933   TROPONINI 0.051*      Recent Labs     06/12/24  0215 06/12/24  0332   PH 7.333* 7.406   PCO2 48.1* 38.4   PO2 29* 79*   HCO3 25.6 24.1   POCSATURATED 50 96   BE 0 -1       Electrolytes: Electrolytes replaced  Accuchecks: none    Gtts/LDAs:   dextrose 10 % in water (D10W)   Intravenous Continuous 25 mL/hr at 06/12/24 0532 New Bag at 06/12/24 0532       Lines/Drains/Airways       Central Venous Catheter Line  Duration             Trialysis (Dialysis) Catheter 06/12/24 0124 right internal jugular <1 day              Drain  Duration                  Urethral Catheter 06/12/24 0300 16 Fr. <1 day              Arterial Line  Duration             Arterial Line 06/12/24 0155 Right Radial <1 day              Line  Duration                  VAD 12/01/23 1015 Left ventricular assist device HeartMate 3 193 days              Peripheral Intravenous Line  Duration                  Peripheral IV - Single Lumen 06/11/24 1605 20 G Left;Posterior Hand <1 day         Peripheral IV - Single Lumen 06/11/24 2226 22 G Posterior;Right Hand <1 day                    Skin/Wounds  Bathing/Skin Care: bath, partial;dressed/undressed (06/12/24 0015)  Wounds: No  Wound care consulted: No   Problem: Skin Injury Risk Increased  Goal: Skin Health and Integrity  Outcome: Progressing

## 2024-06-12 NOTE — ASSESSMENT & PLAN NOTE
Mr. 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 Admitted for worsening renal function with a BUN>100/Cr. 9.9.     Plan:   - CT Ab/pel: Kidneys/ Ureters: Normal in size and location.  Mild bilateral perinephric stranding, a nonspecific finding and similar to prior.  No hydronephrosis or nephrolithiasis. No ureteral dilatation.  - Nephrology consulted, appreciate recs. To start intermittent HD today ASAP  - [place] cath in place for accurate I&O's  - RIJ trialysis placed for hemodynamics monitoring and HD  - Renally dosing medications

## 2024-06-12 NOTE — ASSESSMENT & PLAN NOTE
He completed a 6 week therapy for Enterococcus bacteremia with ampicillin and rocephin ending 5/30/24a and was switched to PO antibiotics.

## 2024-06-12 NOTE — ED NOTES
Patient requesting dose of evening medications, states he did not bring with him, MD notified. Patient also requesting to eat. Patient updated on NPO status, will update if changes.     Patient also provided with urinal and notified of need for specimen

## 2024-06-12 NOTE — HPI
Mr. Abbott is a 64-year-old man with ischemic cardiomyopathy with most recent TTE showing EF 10 -15% and G3DD s/p [employer] ICM placement previously on chronic dobutamine infusion however now with Heartmate III LVAD placed on 12/01/2023, CAD s/p x3 vessel CABG back in 2009, type 2 diabetes mellitus (most recent hemoglobin A1c 7.4%), hypertension, HLD, history of ventricular fibrillation for which he is on amiodarone, chronic AC with Coumadin, advanced CKD V (baseline creatinine ~6) with recurrent AKIs and previous dialysis dependence (tunneled HD catheter removed in early March of this year). He had a recent hospitalization where he was treated for fluid overload acute on chronic CHF exacerbation and IVÁN thought to be 2/2 type 2 CRS and was subsequently diuresed and discharged with a creatinine of 6.2. He presents to the hospital on 6/11 for concerns of worsening fatigue that started one day prior to him arriving to the hospital, and has had difficulties getting out of bed. He has also noted a decrease in his urine output over the past 36 hours despite Diuretic use. He admits adherence to his Metolazone and Bumex at home, however has not passed any urine in the past 10 hours. Bed side bladder scan revealed 200 mL of urine in his bladder. Upon arrival to the ER, his is hemodynamically stable, Labs showed Hgb of 6.3, electrolytes within acceptable limits, Serum CO2 of 21, BUN of 118, and a creatinine of 9.8. He denies feeling fluid overloaded or short of breath, reports good appetite, denies nausea, vomiting, or metallic taste in his mouth. Chest xray showed mild central vascular congestion and interstitial pulmonary opacities which appear improved compared to previous studies. CT abdomen/Pelvis without contrast revealed Kidneys that were reported as normal in size and location, and without hydronephrosis or ureteral dilation.

## 2024-06-12 NOTE — CONSULTS
Patient is a 64 year old male with pmhx of LVAD who was admitted 6/12/2024 for ARF. Nephrology evaluated and recommended a TDC. Patient currently has a temporary line in place. IR consulted for a TDC placement. Patient currently in the CICU. Please re consult appropriate service when patient has been stepped down from the ICU to the floor as TDCs are not placed in ICU status patients d/t risk of infection. Discussed with ordering provider.     Linda Avalos PA-C  Interventional Radiology  Spectra: [phone]  6/12/2024

## 2024-06-12 NOTE — CONSULTS
[facility] - Cardiac Intensive Care  Endocrinology  Diabetes Consult Note    Consult Requested by: Brayan Ocampo MD   Reason for admit: Acute renal failure superimposed on stage 4 chronic kidney disease    HISTORY OF PRESENT ILLNESS:  Reason for Consult: Management of T2DM, Hyperglycemia     Surgical Procedure and Date: HM3 placement as DT (12/1/23)     Diabetes diagnosis year: several years ago    Home Diabetes Medications:  Amaryl 1 mg (recently prescribed by PCP about a week ago)     Lab Results   Component Value Date    HGBA1C 7.4 (H) 04/17/2024       How often checking glucose at home? Once daily in the AM ( prior to starting Amaryl BG in the 300s, since starting Amaryl BG 70-130s)   BG readings on regimen: 70-130s  Hypoglycemia on the regimen?  No  Missed doses on regimen?  No    Diabetes Complications include:     Hypoglycemia , Diabetic nephropathy  , Diabetic chronic kidney disease     , and Diabetic peripheral neuropathy     Complicating diabetes co morbidities:   CHF and CKD      HPI:   Patient is a 64 y.o. male with a diagnosis of stage D HFrEF, ICMP who underwent HM3 placement as DT (12/1/23) w/ a hospital stay complicated by vasoplegia(requiring Giaprezza), debility, malnutrition/impaired swallowing, and renal failure requiring HD (since weaned off) w/ recent discharge for ADHF presented to the ED with wife due to c/o worsening generalized weakness since 6/10. Per wife patient was mostly sleeping during the day despite sleeping 10 hrs at night. Patient also admits decreased appetite and decreased urine output despite taking his medications as prescribed. He has chronic shortness of breath on exertion. Denies confusion, syncope, diarrhea, constipation, N/V, dysuria, or other complaints. Patient had his prior tunnel cath removed used for HD about 2 weeks ago. He completed a 6 week therapy for Enterococcus bacteremia with ampicillin and rocephin ending 5/30/24 and was switched to PO antibiotics. Off note,  patient was recently started by his PCP on glimepiride 1mg and unclear of BG trends during the day. Per patient last fasting  on . Patient reports lasting taking the glimepiride on  AM.   Admitted for worsening renal function with a BUN>100/Cr. 9.9. Endocrinology consulted for management of T2DM.          Interval HPI:   Overnight events: Remains in CICU. BG mostly below goal ranges with hypoglycemia noted. Receiving hypoglcemia treatment. D10 infusion ordered. Creatinine 9.5. Appetite poor. Diet Renal Coumadin Restriction; Fluid - 2000mL    Eatin%  Nausea: No  Hypoglycemia and intervention: Yes  Fever: No  TPN and/or TF: No  If yes, type of TF/TPN and rate: n/a    PMH, PSH, FH, SH reviewed     ROS:  Constitutional: Negative for weight changes.  Eyes: Negative for visual disturbance.  Respiratory: Negative for cough.   Cardiovascular: Negative for chest pain.  Gastrointestinal: Negative for nausea.  Endocrine: Negative for polyuria, polydipsia.  Musculoskeletal: Negative for back pain.  Skin: Negative for rash.  Neurological: Positive for weakness.   Psychiatric/Behavioral: Negative for depression.    Review of Systems    Current Medications and/or Treatments Impacting Glycemic Control  Immunotherapy:    Immunosuppressants       None          Steroids:   Hormones (From admission, onward)      None          Pressors:    Autonomic Drugs (From admission, onward)      None          Hyperglycemia/Diabetes Medications:   Antihyperglycemics (From admission, onward)      None             PHYSICAL EXAMINATION:  Vitals:    24 0801   BP: 123/79   Pulse: 78   Resp: 20   Temp:      Body mass index is 23.41 kg/m².     Physical Exam   Constitutional: Well developed, well nourished, NAD.  ENT: External ears no masses with nose patent; normal hearing.  Neck: Supple; trachea midline.  Cardiovascular: Normal heart sounds; VAD hum.   Lungs: Normal effort; lungs anterior bilaterally clear to  "auscultation.  Abdomen: Soft, no masses, no hernias. Drive line site clean.   MS: No clubbing or cyanosis of nails noted; unable to assess gait.  Skin: No rashes, lesions, or ulcers; no nodules.   Psychiatric: Good judgement and insight; normal mood and affect.  Neurological: Cranial nerves are grossly intact.   Foot: Nails in good condition, no amputations noted.      Labs Reviewed and Include   Recent Labs   Lab 06/12/24  0345   GLU 32*   CALCIUM 8.9   ALBUMIN 2.5*   PROT 7.5      K 3.9   CO2 20*   CL 99   *   CREATININE 9.5*   ALKPHOS 148*   ALT 67*   AST 73*   BILITOT 0.2     Lab Results   Component Value Date    WBC 6.63 06/12/2024    HGB 6.3 (L) 06/12/2024    HCT 20.5 (L) 06/12/2024    MCV 91 06/12/2024     06/12/2024     No results for input(s): "TSH", "FREET4" in the last 168 hours.  Lab Results   Component Value Date    HGBA1C 7.4 (H) 04/17/2024       Nutritional status:   Body mass index is 23.41 kg/m².  Lab Results   Component Value Date    ALBUMIN 2.5 (L) 06/12/2024    ALBUMIN 2.6 (L) 06/11/2024    ALBUMIN 2.5 (L) 05/31/2024     Lab Results   Component Value Date    PREALBUMIN 25 06/12/2024    PREALBUMIN 23 05/31/2024    PREALBUMIN 25 05/29/2024       Estimated Creatinine Clearance: 9.1 mL/min (A) (based on SCr of 9.5 mg/dL (H)).    Accu-Checks  Recent Labs     06/11/24  2326 06/12/24  0021 06/12/24  0530 06/12/24  0630 06/12/24  0747 06/12/24  0844 06/12/24  0951 06/12/24  0955   POCTGLUCOSE 67* 113* 39* 37* 126* 128* 58* 68*        ASSESSMENT and PLAN    Cardiac/Vascular  LVAD (left ventricular assist device) present  Managed per primary team  Avoid hypoglycemia        PAF (paroxysmal atrial fibrillation)  May increase insulin resistance.         Renal/  * Acute renal failure superimposed on stage 4 chronic kidney disease    Lab Results   Component Value Date    CREATININE 9.5 (H) 06/12/2024     Caution with insulin stacking      Endocrine  Type 2 diabetes mellitus without " complication, without long-term current use of insulin  BG goal 140-180  PT w/ stage D HFrEF, ICMP who underwent HM3 placement as DT, DM2, CKD4, HTN, E.faecalis bacteremia who presented due to c/o worsening generalized weakness since 6/10.  Admitted for worsening renal function with a BUN>100/Cr. 9.9.     Recently prescribed Glimepiride about a week ago. Last took on 6/11. Since starting BG has trended down significantly from 300s to 70-130s at home per family. Patient now having hypoglycemia inpatient. Given worsening kidney function likely cause.     Plan:  -BG monitoring ac/hs/0200  -Hypoglycemia protocol in place  -Recommend continuing D10 infusion titrate to keep BG >80    ** Please call Endocrine for any BG related issues **    Discharge plans:   TBD  -D/C Amaryl               Plan discussed with patient, family, and RN at bedside.     Morena Escobar NP  Endocrinology  [facility] - Cardiac Intensive Care

## 2024-06-12 NOTE — ASSESSMENT & PLAN NOTE
Lab Results   Component Value Date    CREATININE 9.5 (H) 06/12/2024     Caution with insulin stacking

## 2024-06-12 NOTE — CONSULTS
[facility] - Cardiac Intensive Care  Nephrology  Consult Note    Patient Name: Radha Abbott  MRN: [id number]  Admission Date: 6/11/2024  Hospital Length of Stay: 1 days  Attending Provider: Brayan Ocampo MD   Primary Care Physician: Vasu Kong MD  Principal Problem:Acute renal failure superimposed on stage 4 chronic kidney disease    Inpatient consult to Nephrology  Consult performed by: Martín Hilliard MD  Consult ordered by: Bernardino Green MD  Reason for consult: IVÁN on CKD        Subjective:     HPI: Mr. Abbott is a 64-year-old man with ischemic cardiomyopathy with most recent TTE showing EF 10 -15% and G3DD s/p [employer] ICM placement previously on chronic dobutamine infusion however now with Heartmate III LVAD placed on 12/01/2023, CAD s/p x3 vessel CABG back in 2009, type 2 diabetes mellitus (most recent hemoglobin A1c 7.4%), hypertension, HLD, history of ventricular fibrillation for which he is on amiodarone, chronic AC with Coumadin, advanced CKD V (baseline creatinine ~6) with recurrent AKIs and previous dialysis dependence (tunneled HD catheter removed in early March of this year). He had a recent hospitalization where he was treated for fluid overload acute on chronic CHF exacerbation and IVÁN thought to be 2/2 type 2 CRS and was subsequently diuresed and discharged with a creatinine of 6.2. He presents to the hospital on 6/11 for concerns of worsening fatigue that started one day prior to him arriving to the hospital, and has had difficulties getting out of bed. He has also noted a decrease in his urine output over the past 36 hours despite Diuretic use. He admits adherence to his Metolazone and Bumex at home, however has not passed any urine in the past 10 hours. Bed side bladder scan revealed 200 mL of urine in his bladder. Upon arrival to the ER, his is hemodynamically stable, Labs showed Hgb of 6.3, electrolytes within acceptable limits, Serum CO2 of 21, BUN of 118, and a  creatinine of 9.8. He denies feeling fluid overloaded or short of breath, reports good appetite, denies nausea, vomiting, or metallic taste in his mouth. Chest xray showed mild central vascular congestion and interstitial pulmonary opacities which appear improved compared to previous studies. CT abdomen/Pelvis without contrast revealed Kidneys that were reported as normal in size and location, and without hydronephrosis or ureteral dilation.     Past Medical History:   Diagnosis Date    CAD (coronary artery disease)     CHF (congestive heart failure)     Diabetes mellitus     HFrEF (heart failure with reduced ejection fraction)     ICD (implantable cardioverter-defibrillator) in place     MI, old        Past Surgical History:   Procedure Laterality Date    ANGIOPLASTY-VENOUS ARTERY Right 12/1/2023    Procedure: ANGIOPLASTY-VENOUS ARTERY, RIGHT FEMORAL;  Surgeon: Yuri Washington MD;  [facility];  Service: Cardiovascular;  Laterality: Right;    AORTIC VALVULOPLASTY N/A 12/1/2023    Procedure: REPAIR, AORTIC VALVE;  Surgeon: Yuri Washington MD;  [facility];  Service: Cardiovascular;  Laterality: N/A;    CARDIAC SURGERY      CLOSURE N/A 12/1/2023    Procedure: CLOSURE, TEMPORARY;  Surgeon: Yuri Washington MD;  Location: [facility] OR [facility];  Service: Cardiovascular;  Laterality: N/A;    DRAINAGE OF PLEURAL EFFUSION  12/4/2023    Procedure: DRAINAGE, PLEURAL EFFUSION;  Surgeon: Yuri Washington MD;  [facility];  Service: Cardiovascular;;    INSERTION OF GRAFT TO PERICARDIUM  12/4/2023    Procedure: INSERTION, GRAFT, PERICARDIUM;  Surgeon: Yuri Washington MD;  [facility];  Service: Cardiovascular;;    INSERTION OF INTRA-AORTIC BALLOON ASSIST DEVICE Right 11/21/2023    Procedure: INSERTION, INTRA-AORTIC BALLOON PUMP;  Surgeon: Finn Cohn MD;  Location: [facility] CATH LAB;  Service: Cardiology;  Laterality: Right;    LEFT VENTRICULAR ASSIST DEVICE Left 12/1/2023     Procedure: INSERTION-LEFT VENTRICULAR ASSIST DEVICE;  Surgeon: Yuri Washington MD;  Location: [facility] OR [facility];  Service: Cardiovascular;  Laterality: Left;  REDO STERNOTOMY - REDO SAW NEEDED FOR CASE    LYSIS OF ADHESIONS  12/1/2023    Procedure: LYSIS, ADHESIONS;  Surgeon: Yuri Washington MD;  [facility];  Service: Cardiovascular;;    PLACEMENT OF SWAN ROLANDO CATHETER WITH IMAGING GUIDANCE  11/20/2023    Procedure: INSERTION, CATHETER, SWAN-ROLANDO, WITH IMAGING GUIDANCE;  Surgeon: Sajan Hurley MD;  Location: [facility] CATH LAB;  Service: Cardiology;;    REMOVAL OF TUNNELED CENTRAL VENOUS CATHETER (CVC) N/A 3/1/2024    Procedure: REMOVAL, CATHETER, CENTRAL VENOUS, TUNNELED;  Surgeon: Seble Aguilar MD;  Location: [facility] CATH LAB;  Service: Interventional Nephrology;  Laterality: N/A;    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 12/1/2023    Procedure: REPAIR, ANEURYSM, ARTERY, FEMORAL;  Surgeon: Yuri Washington MD;  [facility];  Service: Cardiovascular;  Laterality: Right;  Right Femoral Artery Repair    RIGHT HEART CATHETERIZATION Right 10/10/2023    Procedure: INSERTION, CATHETER, RIGHT HEART;  Surgeon: Bin Gandhi MD;  Location: [facility] CATH LAB;  Service: Cardiology;  Laterality: Right;    RIGHT HEART CATHETERIZATION Right 10/13/2023    Procedure: INSERTION, CATHETER, RIGHT HEART;  Surgeon: Walter Mcintyre MD;  Location: [facility] CATH LAB;  Service: Cardiology;  Laterality: Right;    RIGHT HEART CATHETERIZATION  11/13/2023    RIGHT HEART CATHETERIZATION Right 11/13/2023    Procedure: INSERTION, CATHETER, RIGHT HEART;  Surgeon: Juventino Bermudez Jr., MD;  Location: [facility] CATH LAB;  Service: Cardiology;  Laterality: Right;    RIGHT HEART CATHETERIZATION Right 11/20/2023    Procedure: INSERTION, CATHETER, RIGHT HEART;  Surgeon: Sajan Hurley MD;  Location: [facility] CATH LAB;  Service: Cardiology;  Laterality: Right;    RIGHT HEART CATHETERIZATION Right 1/22/2024    Procedure:  INSERTION, CATHETER, RIGHT HEART;  Surgeon: Brayan Ocampo MD;  Location: [facility] CATH LAB;  Service: Cardiology;  Laterality: Right;    STERNAL WOUND CLOSURE N/A 12/4/2023    Procedure: CLOSURE, WOUND, STERNUM;  Surgeon: Yuri Washington MD;  [facility];  Service: Cardiovascular;  Laterality: N/A;    STERNOTOMY N/A 12/1/2023    Procedure: STERNOTOMY, REDO;  Surgeon: Yuri Washington MD;  Location: [facility] OR [facility]LR;  Service: Cardiovascular;  Laterality: N/A;    VALVULOPLASTY, MITRAL VALVE N/A 12/1/2023    Procedure: VALVULOPLASTY, MITRAL VALVE;  Surgeon: Yuri Washington MD;  [facility];  Service: Cardiovascular;  Laterality: N/A;       Review of patient's allergies indicates:  No Known Allergies  Current Facility-Administered Medications   Medication Frequency    amiodarone tablet 400 mg Daily    amoxicillin capsule 500 mg Q12H    atorvastatin tablet 40 mg QHS    bumetanide injection 4 mg Q12H    famotidine tablet 20 mg Daily    folic acid tablet 1 mg Daily    LIDOcaine (PF) 10 mg/ml (1%) 10 mg/mL (1 %) injection     trazodone split tablet 25 mg Nightly PRN    venlafaxine tablet 37.5 mg Daily    vitamin D 1000 units tablet 1,000 Units Daily    warfarin (COUMADIN) tablet 2.5 mg Daily     Family History    None       Tobacco Use    Smoking status: Former     Current packs/day: 0.50     Types: Cigarettes    Smokeless tobacco: Not on file   Substance and Sexual Activity    Alcohol use: Yes     Comment: rarely    Drug use: No    Sexual activity: Not Currently     Partners: Female     Review of Systems   Constitutional:  Positive for fatigue. Negative for activity change, chills, diaphoresis and fever.   Respiratory:  Negative for cough, chest tightness, shortness of breath and wheezing.    Cardiovascular:  Negative for chest pain, palpitations and leg swelling.   Gastrointestinal:  Positive for abdominal distention. Negative for blood in stool, constipation, diarrhea, nausea and vomiting.    Endocrine: Negative for polyuria.   Genitourinary:  Positive for difficulty urinating. Negative for flank pain and hematuria.   Skin:  Negative for color change, pallor, rash and wound.   Neurological:  Negative for dizziness, speech difficulty, weakness, light-headedness, numbness and headaches.     Objective:     Vital Signs (Most Recent):  Temp: 98 °F (36.7 °C) (06/11/24 1802)  Pulse: 78 (06/12/24 0015)  Resp: 18 (06/12/24 0015)  BP: (!) 110/55 (06/12/24 0045)  SpO2: (!) 94 % (06/12/24 0015) Vital Signs (24h Range):  Temp:  [98 °F (36.7 °C)] 98 °F (36.7 °C)  Pulse:  [74-87] 78  Resp:  [16-24] 18  SpO2:  [94 %-99 %] 94 %  BP: ()/(0-55) 110/55     Weight: 81.2 kg (179 lb) (06/11/24 1549)  Body mass index is 23.62 kg/m².  Body surface area is 2.05 meters squared.    No intake/output data recorded.     Physical Exam  Vitals and nursing note reviewed.   Constitutional:       General: He is not in acute distress.     Appearance: Normal appearance. He is not ill-appearing or toxic-appearing.   HENT:      Head: Normocephalic and atraumatic.      Right Ear: External ear normal.      Left Ear: External ear normal.      Nose: Nose normal.      Mouth/Throat:      Mouth: Mucous membranes are moist.   Eyes:      Extraocular Movements: Extraocular movements intact.   Cardiovascular:      Rate and Rhythm: Normal rate and regular rhythm.      Pulses: Normal pulses.      Heart sounds: Normal heart sounds. No murmur heard.  Pulmonary:      Effort: Pulmonary effort is normal. No respiratory distress.      Breath sounds: Normal breath sounds. No wheezing or rales.   Chest:      Chest wall: No tenderness.   Abdominal:      General: Abdomen is flat. There is distension.      Palpations: Abdomen is soft.   Musculoskeletal:         General: No swelling or deformity. Normal range of motion.      Cervical back: Normal range of motion and neck supple. No rigidity or tenderness.      Right lower leg: No edema.      Left lower leg: No  edema.      Comments: asterixis noted (patients states this is chronic for him and unchanged from his baseline)   Skin:     General: Skin is warm and dry.      Capillary Refill: Capillary refill takes 2 to 3 seconds.   Neurological:      General: No focal deficit present.      Mental Status: He is alert and oriented to person, place, and time.   Psychiatric:         Mood and Affect: Mood normal.         Behavior: Behavior normal.         Thought Content: Thought content normal.         Judgment: Judgment normal.          Significant Labs:  CBC:   Recent Labs   Lab 06/11/24  1605   WBC 7.04   RBC 2.20*   HGB 6.3*   HCT 19.8*      MCV 90   MCH 28.6   MCHC 31.8*     CMP:   Recent Labs   Lab 06/11/24  1605 06/11/24  2227   GLU 84 38*   CALCIUM 8.7 9.1   ALBUMIN 2.6*  --    PROT 7.6  --     139   K 4.1 4.2   CO2 20* 21*   CL 99 100   * 117*   CREATININE 9.9* 9.8*   ALKPHOS 142*  --    ALT 59*  --    AST 58*  --    BILITOT 0.2  --      All labs within the past 24 hours have been reviewed.    Significant Imaging:  US: Reviewed  CT: Reviewed    Assessment/Plan:     Cardiac/Vascular  LVAD (left ventricular assist device) present  .    PAF (paroxysmal atrial fibrillation)  .    Acute on chronic combined systolic and diastolic heart failure  Diuresis per primary team.     CAD (coronary artery disease)  .    Renal/  * Acute renal failure superimposed on stage 4 chronic kidney disease  Patient with a baseline eGFR that appears to be variable however was noted to be in the 15-20 range in February before his recent IVÁN in May, presents now with fatigue and worsening IVÁN with a creatinine of 9.8 and eGFR of 5.5. BUN was noted to be elevated at 117, other electrolytes were normal. He was noted to have some asterixis on exam (which he states is chronic for him and unchanged from his baseline), however denies any other signs or symptoms of uremia. Bedside bladder scan revealed 200 mL of urine in his bladder and  his last bladder void was 10 hours prior to this consult.     Recommendations  -No acute indication for dialysis at this time since his electrolytes appear stable, he is on room air, denies respiratory symptoms, and no new evidence of uremia (dialysis consent was obtained and placed in his chart on 6/12/24 at 0025)  -Recommend goodman placement to decompress the bladder  -Please obtain a urine sample for urine microscopy  -Agree with current diuresis medications  -Avoid nephrotoxic agents (IV contrast, NSAID's, gadolinium contrast, PPI's) if able.   -Strict I's and O's  -Daily renal function panel  -Renally dose all medications        Thank you for your consult. I will follow-up with patient. Please contact us if you have any additional questions.    Martín Hilliard MD  Nephrology  [facility] - Cardiac Intensive [facility]

## 2024-06-12 NOTE — ED NOTES
Assumed care of patient. Patient is alert and resting comfortably in bed in NAD. BP, cardiac, and O2 monitoring continued. Family member at bedside. Patient updated on plan of care. Bed locked in lowest position, side rails up x2, call light within reach. VSS. Will continue to monitor.

## 2024-06-12 NOTE — ED PROVIDER NOTES
Encounter Date: 6/11/2024       History     Chief Complaint   Patient presents with    LVAD     Pain to L side of abd, lvad coord evin abernathy notified      HPI  Patient is a 64-year-old male with history of heart failure status post LVAD placement in December of this past year, CAD, diabetes who presents for generalized weakness and pain is his left side. Onset of symptoms two days ago.  Patient reports associated generalized weakness that has been progressive over the last 2 days.  He states that the weakness is so severe that he was having difficulty getting out of bed and completing his ADLs.  He also has pain in his left side.  He denies any abdominal pain, nausea, vomiting, diarrhea.  No urinary symptoms. No fever or chills. No chest pain or shortness of breath.  No back pain.      Review of patient's allergies indicates:  No Known Allergies  Past Medical History:   Diagnosis Date    CAD (coronary artery disease)     CHF (congestive heart failure)     Diabetes mellitus     HFrEF (heart failure with reduced ejection fraction)     ICD (implantable cardioverter-defibrillator) in place     MI, old      Past Surgical History:   Procedure Laterality Date    ANGIOPLASTY-VENOUS ARTERY Right 12/1/2023    Procedure: ANGIOPLASTY-VENOUS ARTERY, RIGHT FEMORAL;  Surgeon: Yuri Washington MD;  Location: [facility] OR [address] [place] [address];  Service: Cardiovascular;  Laterality: Right;    AORTIC VALVULOPLASTY N/A 12/1/2023    Procedure: REPAIR, AORTIC VALVE;  Surgeon: Yuri Washington MD;  [facility];  Service: Cardiovascular;  Laterality: N/A;    CARDIAC SURGERY      CLOSURE N/A 12/1/2023    Procedure: CLOSURE, TEMPORARY;  Surgeon: Yuri Washington MD;  [facility];  Service: Cardiovascular;  Laterality: N/A;    DRAINAGE OF PLEURAL EFFUSION  12/4/2023    Procedure: DRAINAGE, PLEURAL EFFUSION;  Surgeon: Yuri Washington MD;  Location: [facility] OR [address] [place] [address];  Service: Cardiovascular;;    INSERTION OF GRAFT TO PERICARDIUM   12/4/2023    Procedure: INSERTION, GRAFT, PERICARDIUM;  Surgeon: Yuri Washington MD;  Location: [facility] OR [facility] FLR;  Service: Cardiovascular;;    INSERTION OF INTRA-AORTIC BALLOON ASSIST DEVICE Right 11/21/2023    Procedure: INSERTION, INTRA-AORTIC BALLOON PUMP;  Surgeon: Finn Cohn MD;  Location: [facility] CATH LAB;  Service: Cardiology;  Laterality: Right;    LEFT VENTRICULAR ASSIST DEVICE Left 12/1/2023    Procedure: INSERTION-LEFT VENTRICULAR ASSIST DEVICE;  Surgeon: Yuri Washington MD;  Location: [facility] OR [facility] FLR;  Service: Cardiovascular;  Laterality: Left;  REDO STERNOTOMY - REDO SAW NEEDED FOR CASE    LYSIS OF ADHESIONS  12/1/2023    Procedure: LYSIS, ADHESIONS;  Surgeon: Yuri Washington MD;  [facility];  Service: Cardiovascular;;    PLACEMENT OF SWAN ROLANDO CATHETER WITH IMAGING GUIDANCE  11/20/2023    Procedure: INSERTION, CATHETER, SWAN-ROLANDO, WITH IMAGING GUIDANCE;  Surgeon: Sajan Hurley MD;  Location: [facility] CATH LAB;  Service: Cardiology;;    REMOVAL OF TUNNELED CENTRAL VENOUS CATHETER (CVC) N/A 3/1/2024    Procedure: REMOVAL, CATHETER, CENTRAL VENOUS, TUNNELED;  Surgeon: Seble Aguilar MD;  Location: [facility] CATH LAB;  Service: Interventional Nephrology;  Laterality: N/A;    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 12/1/2023    Procedure: REPAIR, ANEURYSM, ARTERY, FEMORAL;  Surgeon: Yuri Washington MD;  [facility];  Service: Cardiovascular;  Laterality: Right;  Right Femoral Artery Repair    RIGHT HEART CATHETERIZATION Right 10/10/2023    Procedure: INSERTION, CATHETER, RIGHT HEART;  Surgeon: Bin Gandhi MD;  Location: [facility] CATH LAB;  Service: Cardiology;  Laterality: Right;    RIGHT HEART CATHETERIZATION Right 10/13/2023    Procedure: INSERTION, CATHETER, RIGHT HEART;  Surgeon: Walter Mcintyre MD;  Location: [facility] CATH LAB;  Service: Cardiology;  Laterality: Right;    RIGHT HEART CATHETERIZATION  11/13/2023    RIGHT HEART CATHETERIZATION Right 11/13/2023     Procedure: INSERTION, CATHETER, RIGHT HEART;  Surgeon: Juventino Bermudez Jr., MD;  Location: [facility] CATH LAB;  Service: Cardiology;  Laterality: Right;    RIGHT HEART CATHETERIZATION Right 11/20/2023    Procedure: INSERTION, CATHETER, RIGHT HEART;  Surgeon: Sajan Hurley MD;  Location: [facility] CATH LAB;  Service: Cardiology;  Laterality: Right;    RIGHT HEART CATHETERIZATION Right 1/22/2024    Procedure: INSERTION, CATHETER, RIGHT HEART;  Surgeon: Brayan Ocampo MD;  Location: [facility] CATH LAB;  Service: Cardiology;  Laterality: Right;    STERNAL WOUND CLOSURE N/A 12/4/2023    Procedure: CLOSURE, WOUND, STERNUM;  Surgeon: Yuri Washington MD;  Location: [facility] OR 2ND FLR;  Service: Cardiovascular;  Laterality: N/A;    STERNOTOMY N/A 12/1/2023    Procedure: STERNOTOMY, REDO;  Surgeon: Yuri Washington MD;  Location: [facility] OR 2ND FLR;  Service: Cardiovascular;  Laterality: N/A;    VALVULOPLASTY, MITRAL VALVE N/A 12/1/2023    Procedure: VALVULOPLASTY, MITRAL VALVE;  Surgeon: Yuri Washington MD;  Location: [facility] OR 2ND FLR;  Service: Cardiovascular;  Laterality: N/A;     No family history on file.  Social History     Tobacco Use    Smoking status: Former     Current packs/day: 0.50     Types: Cigarettes   Substance Use Topics    Alcohol use: Yes     Comment: rarely    Drug use: No     Review of Systems  A full ros was obtained, see HPI for pertinent positives.     Physical Exam     Initial Vitals   BP Pulse Resp Temp SpO2   06/11/24 1652 06/11/24 1549 06/11/24 1549 06/11/24 1549 06/11/24 1802   (!) 84/0 87 20 98 °F (36.7 °C) 98 %      MAP       --                Physical Exam  Constitutional: No acute distress, non-toxic appearing  HENT: NC/AT  Respiratory: Non-labored, lungs clear  Cardiovascular: Well perfused, LVAD hum auscultated on exam, driveline site without any surrounding warmth, erythema or drainage  Gastrointestinal: Soft, non-tender, non-distended  Integumentary: Warm and dry  Musculoskeletal: No deformity,  TTP over the left lower lateral chest wall and left side just beneath the ribs, no unilateral leg swelling, warmth or erythema  Genitourinary: No CVA tenderness  Neurological: Awake and alert  Psychiatric: Cooperative     ED Course   Procedures  Labs Reviewed   CBC W/ AUTO DIFFERENTIAL - Abnormal; Notable for the following components:       Result Value    RBC 2.20 (*)     Hemoglobin 6.3 (*)     Hematocrit 19.8 (*)     MCHC 31.8 (*)     RDW 17.2 (*)     Immature Granulocytes 0.7 (*)     Immature Grans (Abs) 0.05 (*)     Lymph # 0.4 (*)     Gran % 84.0 (*)     Lymph % 6.0 (*)     All other components within normal limits    Narrative:      hct   critical result(s) called and verbal readback obtained from   mora ledric,rn by MEF1 06/11/2024 17:30   COMPREHENSIVE METABOLIC PANEL - Abnormal; Notable for the following components:    CO2 20 (*)      (*)     Creatinine 9.9 (*)     Albumin 2.6 (*)     Alkaline Phosphatase 142 (*)     AST 58 (*)     ALT 59 (*)     eGFR 5.4 (*)     Anion Gap 19 (*)     All other components within normal limits   B-TYPE NATRIURETIC PEPTIDE - Abnormal; Notable for the following components:    BNP 1,388 (*)     All other components within normal limits   TROPONIN I - Abnormal; Notable for the following components:    Troponin I 0.051 (*)     All other components within normal limits   PROTIME-INR - Abnormal; Notable for the following components:    Prothrombin Time 24.8 (*)     INR 2.4 (*)     All other components within normal limits   PROCALCITONIN - Abnormal; Notable for the following components:    Procalcitonin 0.74 (*)     All other components within normal limits   BASIC METABOLIC PANEL - Abnormal; Notable for the following components:    CO2 21 (*)     Glucose 38 (*)      (*)     Creatinine 9.8 (*)     Anion Gap 18 (*)     eGFR 5.5 (*)     All other components within normal limits    Narrative:     On admit   APTT - Abnormal; Notable for the following components:    aPTT  46.5 (*)     All other components within normal limits    Narrative:     ADD ON PHOS AND MAGNESIUM PER DR CHING STANFORD/ORDER# [id number] ANDX   [id number] @ 22:23    ADD ON PTT PER DR CHING STANFORD/ORDER# [id number] @ 22:20   PHOSPHORUS - Abnormal; Notable for the following components:    Phosphorus 7.5 (*)     All other components within normal limits    Narrative:     ADD ON PHOS AND MAGNESIUM PER DR CHING STANFORD/ORDER# [id number] ANDX   [id number] @ 22:23    ADD ON PTT PER DR CHING STANFORD/ORDER# [id number] @ 22:20   CULTURE, BLOOD   CULTURE, BLOOD   LIPASE   APTT   MAGNESIUM   PHOSPHORUS   MAGNESIUM    Narrative:     ADD ON PHOS AND MAGNESIUM PER DR CHING STANFORD/ORDER# [id number] ANDX   [id number] @ 22:23    ADD ON PTT PER DR CHING STANFORD/ORDER# [id number] @ 22:20   URINALYSIS, REFLEX TO URINE CULTURE   ISTAT LACTATE          Imaging Results              CT Abdomen Pelvis  Without Contrast (Final result)  Result time 06/11/24 20:32:31      Final result by Eliu Garg MD (06/11/24 20:32:31)                   Impression:      Increased left pleural effusion.    No acute findings in the abdomen or pelvis.    Additional findings as above.    Electronically signed by resident: Marc Rivas  Date:    06/11/2024  Time:    19:48    Electronically signed by: Eliu Garg MD  Date:    06/11/2024  Time:    20:32               Narrative:    EXAMINATION:  CT ABDOMEN PELVIS WITHOUT CONTRAST    CLINICAL HISTORY:  Flank pain, kidney stone suspected;left flank pain and left sided chest wall pain;    TECHNIQUE:  Axial images of the abdomen and pelvis were acquired without the use of IV contrast.  Coronal and sagittal reconstructions were also obtained.    COMPARISON:  Retroperitoneal ultrasound 05/29/2024    CT chest and pelvis 04/17/2024    FINDINGS:  Lack of IV contrast limits evaluation of soft tissue and vascular structures.  Beam hardening with streak artifact from overlying monitor leads and devices somewhat limits  evaluation.    Heart: Enlarged.  Multi-vessel calcific coronary atherosclerosis.  Partially visualized LVAD.    Lungs: Partially visualized moderate left pleural effusion, increased from prior CT.  Mild adjacent compressive atelectasis.  Mild diffuse patchy ground-glass opacities throughout both lungs, possible pulmonary edema.  Bibasilar atelectasis.  No right pleural fluid.    Liver: Mildly enlarged measuring 19.1 cm in craniocaudal dimension.  Normal contour.  No focal hepatic lesion of the lack of intravenous contrast and streak artifact from LVAD limit assessment.    Gallbladder: Calcified gallstones.    Bile Ducts: No evidence of dilated ducts.    Pancreas: No mass or peripancreatic fat stranding.    Spleen: Unremarkable.    Stomach and duodenum: Unremarkable.    Adrenals: Unremarkable.    Kidneys/ Ureters: Normal in size and location.  Mild bilateral perinephric stranding, a nonspecific finding and similar to prior.  No hydronephrosis or nephrolithiasis. No ureteral dilatation.    Bladder: No evidence of wall thickening.    Reproductive organs: Prostate mildly enlarged.    Bowel/Mesentery: Small bowel is normal in caliber with no evidence of obstruction. No evidence of inflammation or wall thickening.  Normal retrocecal appendix.  Colon demonstrates no focal wall thickening.    Peritoneum: No intraperitoneal free air or fluid    Lymph nodes: No retroperitoneal lymphadenopathy.    Vasculature: No aneurysm. Advanced calcific atherosclerosis.    Abdominal wall:  Small fat containing umbilical hernia.  Mild body wall edema.  No abnormalities along the course of the drive line.    Bones: Partially visualized postoperative change sternotomy.  Degenerative change including ankylosis of several left lumbar transverse processes.  No acute fracture. No suspicious osseous lesions.                                        X-Ray Chest AP Portable (Final result)  Result time 06/11/24 20:07:40      Final result by Alonso  MD Jake (06/11/24 20:07:40)                   Impression:      Left lung base opacity, likely atelectasis.  Pneumonia or other pathology not excluded    Persistently enlarged cardiopericardial silhouette.    Diminished but not fully resolved pulmonary venous hypertension and interstitial pulmonary edema.  CHF is a consideration.  Interstitial pneumonia or other interstitial pulmonary pathology not excluded.    Correlate clinically, if continued radiology follow-up.    This report was flagged in Epic as abnormal.      Electronically signed by: Jake Gupta  Date:    06/11/2024  Time:    20:07               Narrative:    EXAMINATION:  XR CHEST AP PORTABLE    CLINICAL HISTORY:  Weakness    TECHNIQUE:  Single frontal view of the chest was performed.    COMPARISON:  Portable chest x-ray 05/28/2024    FINDINGS:  Confluent airspace opacification in the left lung base, most likely representing left lower lobe atelectasis.  Pneumonia or other pathology not excluded.    Mild central vascular congestion and interstitial pulmonary opacities, diminished from the comparison study but not fully resolved (or recurrent).    Persistently enlarged cardiopericardial silhouette.    Prior sternotomy and placement of LVAD and left subclavian trans venous ICD.  Other devices partially visualized projecting over the lower chest and upper abdomen are possibly related to a LifeVest wearable defibrillator.    The previously demonstrated right central venous line is no longer apparent.    No pneumothorax.  The right lateral costophrenic angle sharp, the left is obscured by superimposed devices.    Osseous detail is suboptimally visualized, especially in the spine.  Degenerative changes in both AC joints.                                       Medications   amiodarone tablet 400 mg (has no administration in time range)   atorvastatin tablet 40 mg (has no administration in time range)   famotidine tablet 20 mg (has no administration in time  range)   folic acid tablet 1 mg (has no administration in time range)   venlafaxine tablet 37.5 mg (has no administration in time range)   vitamin D 1000 units tablet 1,000 Units (has no administration in time range)   warfarin (COUMADIN) tablet 2.5 mg (has no administration in time range)   bumetanide injection 2 mg (has no administration in time range)   morphine injection 2 mg (2 mg Intravenous Given 6/11/24 1933)     Medical Decision Making  Patient is a 64-year-old male with history of heart failure status post LVAD, CKD who presents for generalized weakness and pain in his left side.  He has no abdominal tenderness in his not reporting any abdominal pain.  He also does not seem to have any flank pain but his pain is in the left lateral lower ribcage and just beneath it.  He has no rashes or overlying skin changes but the area does seem tender.  No CVA tenderness or urinary symptoms.  No other GI symptoms like nausea, vomiting, diarrhea.  He was not reporting any new chest pain or shortness of breath but does report that he has been progressively more weak over the last 2 days.  On exam, patient was hemodynamically stable.  Map is 82.  Will obtain labs as well as imaging.  Will obtain CT non-con abdomen pelvis to rule out kidney stone and other etiologies for left-sided pain.  Will also obtain chest x-ray and labs.  Will provide symptomatic treatment.  Plan to discuss with Cardiology.    Labs starting to result.  Patient has worsening of his kidney function.  He has an IVÁN on CKD.  His creatinine today is 9.9 up from 6.2.  His CT does not show any acute findings in the abdomen pelvis but he does have evidence of pleural effusion in the left lung base.  This is also seen on chest x-ray but difficult to exclude pneumonia.  He also has anemia but it appears stable from prior over the last several days and he was not having any active signs of bleeding.  His CT does not show any ureteral stones or other causes for  his pain in his left side.  The pleural effusion versus pneumonia in the area could cause some discomfort.  He was not hypoxic.  His labs do also look concerning for volume overload.  I discussed with cardiology.  They recommend holding off on blood transfusion at this time as well as antibiotics.  I have ordered blood cultures as well as a procalcitonin.  They plan to continue to monitor the patient and admit to their service.    Critical Care  Performed by: Christa Mcconnell MD   Authorized by: Christa Mcconnell MD    Total critical care time (exclusive of procedural time) : 21 minutes  Critical care was necessary to treat or prevent imminent or life-threatening deterioration of the following conditions:  morgan on ckd       Amount and/or Complexity of Data Reviewed  Labs: ordered. Decision-making details documented in ED Course.  Radiology: ordered.    Risk  Prescription drug management.  Decision regarding hospitalization.               ED Course as of 06/11/24 2332   Tue Jun 11, 2024   1647 ISTAT Lactate [DS]   1748 Notified o this f critically low hematocrit at 19.8 but this is not a significant change from previous.  Progressive chronic kidney disease is likewise noted. [DS]   1811 EKG with morphology similar to prior, EKG looks like sinus rhythm with wide QRS, morphology similar to prior, no STEMI, rate 82 [NN]   1811 BP(!): 81/0 [NN]   1812 Hemoglobin(!): 6.3  Stable from prior [NN]   1812 Creatinine(!): 9.9  6.2 - 11 days ago [NN]   1812 Worsening kideny function [NN]   2017 Impression:     Left lung base opacity, likely atelectasis.  Pneumonia or other pathology not excluded     Persistently enlarged cardiopericardial silhouette.     Diminished but not fully resolved pulmonary venous hypertension and interstitial pulmonary edema.  CHF is a consideration.  Interstitial pneumonia or other interstitial pulmonary pathology not excluded.     Correlate clinically, if continued radiology follow-up.     This  report was flagged in Epic as abnormal.   [NN]   2017 Chest x-ray with possible pneumonia. [NN]   2017 Hemoglobin(!): 6.3  Hemoglobin baseline [NN]   2017 Creatinine(!): 9.9  Worsening kidney function [NN]   2023 Discussed with cardiology who was covering the heart failure service.  They are going to come evaluate the patient.  They recommend holding off on treating for pneumonia at this time as patient has no leukocytosis.  He was admitted previously for pneumonia so this could be residual.  They are okay with obtaining blood cultures which I have ordered.  I have also ordered a procalcitonin.  They are also aware that his hemoglobin is 6.3 but also agree with holding off on transfusion at this time.  They were notified of his worsening kidney function as well. [NN]   2025 Urine, CT, procalcitonin still pending [NN]   2137 Impression:     Increased left pleural effusion.     No acute findings in the abdomen or pelvis.     Additional findings as above.   [NN]   2153 CT abdomen pelvis reassuring.  Patient does have evidence of increased left pleural effusion which correlates with his chest x-ray.  This could be contributing to the patient's discomfort. [NN]   2153 Procalcitonin and urinalysis still pending at time of admission to advanced heart failure Service. [NN]      ED Course User Index  [DS] Isma Blackwell MD  [NN] Christa Mcconnell MD                           Clinical Impression:  Final diagnoses:  [Z95.811] LVAD (left ventricular assist device) present  [R53.1] Weakness  [R07.89] Chest wall pain  [N17.9] Acute renal failure          ED Disposition Condition    Admit                 Christa Mcconnell MD  06/11/24 [id number]

## 2024-06-12 NOTE — EICU
..Nurses Note -- 4 Eyes      6/12/2024   12:56 AM      Skin assessed during: Admit      [x] No Altered Skin Integrity Present    []Prevention Measures Documented      [] Yes- Altered Skin Integrity Present or Discovered   [] LDA Added if Not in Epic (Describe Wound)   [] New Altered Skin Integrity was Present on Admit and Documented in LDA   [] Wound Image Taken    Wound Care Consulted? No    Attending Nurse:  Stan Avalos RN/Staff Member:   Hussain

## 2024-06-12 NOTE — PROGRESS NOTES
[facility] - Cardiac Intensive [facility]  Heart Transplant  Progress Note    Patient Name: Radha Abbott  MRN: [id number]  Admission Date: 6/11/2024  Hospital Length of Stay: 1 days  Attending Physician: Brayan Ocampo MD  Primary Care Provider: Vasu Kong MD  Principal Problem:Acute renal failure superimposed on stage 4 chronic kidney disease    Subjective:   Interval History: Lethargic, + asterixis, CVP 23 earlier this morning - transitioned IVP Bumex to Bumex 2 mg/hr with increase in UOP from ~ 25 cc/hr to 50 cc/hr. CVP continues to climb, however. Appreciate Nephrology's help. Plan to initiate intermittent HD ASAP ( trialysis line placed overnight). Per patient's wife, he has been sleeping ~ 20 hours a day for the past 2 weeks. Glucoses have finally normalized with D10W at 75 cc/hr - appreciate Endocrine's help - will titrate D10W to keep glucoses > 80.     Continuous Infusions:   bumetanide (BUMEX) 25 mg in 100 mL infusion (conc: 0.25 mg/mL)  2 mg/hr Intravenous Continuous 8 mL/hr at 06/12/24 1101 2 mg/hr at 06/12/24 1101    dextrose 10 % in water (D10W)   Intravenous Continuous 75 mL/hr at 06/12/24 1101 Rate Verify at 06/12/24 1101     Scheduled Meds:   amiodarone  400 mg Oral Daily    amoxicillin  500 mg Oral Q12H    atorvastatin  40 mg Oral QHS    folic acid  1 mg Oral Daily    hydrALAZINE  100 mg Oral Q8H    pantoprazole  40 mg Oral Daily    venlafaxine  37.5 mg Oral Daily    vitamin D  1,000 Units Oral Daily     PRN Meds:  Current Facility-Administered Medications:     acetaminophen, 650 mg, Oral, Q6H PRN    dextrose 10%, 12.5 g, Intravenous, PRN    dextrose 10%, 12.5 g, Intravenous, PRN    dextrose 10%, 25 g, Intravenous, PRN    dextrose 10%, 25 g, Intravenous, PRN    glucagon (human recombinant), 1 mg, Intramuscular, PRN    glucagon (human recombinant), 1 mg, Intramuscular, PRN    glucose, 16 g, Oral, PRN    glucose, 16 g, Oral, PRN    glucose, 24 g, Oral, PRN    glucose, 24 g, Oral, PRN    traZODone,  25 mg, Oral, Nightly PRN    Review of patient's allergies indicates:  No Known Allergies  Objective:     Vital Signs (Most Recent):  Temp: 97.5 °F (36.4 °C) (06/12/24 0701)  Pulse: 78 (06/12/24 0801)  Resp: 20 (06/12/24 0801)  BP: 123/79 (06/12/24 0801)  SpO2: 98 % (06/12/24 0801) Vital Signs (24h Range):  Temp:  [97.5 °F (36.4 °C)-98 °F (36.7 °C)] 97.5 °F (36.4 °C)  Pulse:  [68-87] 78  Resp:  [13-24] 20  SpO2:  [93 %-100 %] 98 %  BP: ()/(0-89) 123/79  Arterial Line BP: ()/(66-84) 110/84     Patient Vitals for the past 72 hrs (Last 3 readings):   Weight   06/12/24 0401 80.5 kg (177 lb 7.5 oz)   06/11/24 1549 81.2 kg (179 lb)     Body mass index is 23.41 kg/m².      Intake/Output Summary (Last 24 hours) at 6/12/2024 1234  Last data filed at 6/12/2024 1101  Gross per 24 hour   Intake 510.95 ml   Output 535 ml   Net -24.05 ml       Hemodynamic Parameters:       Telemetry: SR       Physical Exam  Constitutional:       Comments: Lethargic, arouses to voice. Oriented X 3   HENT:      Head: Normocephalic and atraumatic.   Eyes:      Conjunctiva/sclera: Conjunctivae normal.      Pupils: Pupils are equal, round, and reactive to light.   Neck:      Comments: RIJ trialysis cath. JVP at earlobe  Cardiovascular:      Rate and Rhythm: Normal rate and regular rhythm.      Comments: Smooth VAD hum  Pulmonary:      Effort: Pulmonary effort is normal.      Breath sounds: Normal breath sounds.   Abdominal:      General: Bowel sounds are normal.      Palpations: Abdomen is soft.   Musculoskeletal:         General: No swelling. Normal range of motion.      Cervical back: Normal range of motion and neck supple.   Skin:     General: Skin is warm and dry.      Capillary Refill: Capillary refill takes 2 to 3 seconds.   Neurological:      Comments: Lethargic, arouses to voice. Oriented X 3            Significant Labs:  CBC:  Recent Labs   Lab 06/11/24  1605 06/12/24  0345   WBC 7.04 6.63   RBC 2.20* 2.26*   HGB 6.3* 6.3*   HCT  19.8* 20.5*    319   MCV 90 91   MCH 28.6 27.9   MCHC 31.8* 30.7*     BNP:  Recent Labs   Lab 06/11/24 1933   BNP 1,388*     CMP:  Recent Labs   Lab 06/11/24  1605 06/11/24  2227 06/12/24  0345   GLU 84 38* 32*   CALCIUM 8.7 9.1 8.9   ALBUMIN 2.6*  --  2.5*   PROT 7.6  --  7.5    139 136   K 4.1 4.2 3.9   CO2 20* 21* 20*   CL 99 100 99   * 117* 114*   CREATININE 9.9* 9.8* 9.5*   ALKPHOS 142*  --  148*   ALT 59*  --  67*   AST 58*  --  73*   BILITOT 0.2  --  0.2      Coagulation:   Recent Labs   Lab 06/07/24  0000 06/11/24 1933 06/12/24 0345   INR 2.6 2.4* 2.4*   APTT  --  46.5* 45.2*     LDH:  Recent Labs   Lab 06/12/24  0345   *     Microbiology:  Microbiology Results (last 7 days)       Procedure Component Value Units Date/Time    Blood culture #1 **CANNOT BE ORDERED STAT** [[id number]] Collected: 06/11/24 2122    Order Status: Completed Specimen: Blood from Peripheral, Antecubital, Right Updated: 06/12/24 0345     Blood Culture, Routine No Growth to date    Blood culture #2 **CANNOT BE ORDERED STAT** [[id number]] Collected: 06/11/24 2121    Order Status: Completed Specimen: Blood from Peripheral, Hand, Left Updated: 06/12/24 0345     Blood Culture, Routine No Growth to date            I have reviewed all pertinent labs within the past 24 hours.    Estimated Creatinine Clearance: 9.1 mL/min (A) (based on SCr of 9.5 mg/dL (H)).    Diagnostic Results:  I have reviewed and interpreted all pertinent imaging results/findings within the past 24 hours.  Assessment and Plan:     Mr. Radha Abbott is a 65 yo male with stage D HFrEF, ICMP who underwent HM3 placement as DT (12/1/23) w/ a hospital stay complicated by vasoplegia(requiring Giaprezza), debility, malnutrition/impaired swallowing, and renal failure requiring HD (since weaned off) w/ recent discharge for ADHF presented to the ED with wife due to c/o worsening generalized weakness since 6/10. Per wife patient was mostly sleeping during  the day despite sleeping 10 hrs at night. Patient also admits decreased appetite and decreased urine output despite taking his medications as prescribed. He has not urinated since 6/11 at 2pm. He has chronic shortness of breath on exertion. Denies confusion, syncope, diarrhea, constipation, N/V, dysuria, or other complaints. Patient had his prior tunnel cath removed used for HD about 2 weeks ago. He completed a 6 week therapy for Enterococcus bacteremia with ampicillin and rocephin ending 5/30/24a and was switched to PO antibiotics.     On prior hospital stay nephrology consulted during hospital stay due to elevated renal function. They feel he is close to being a dialysis patient. He diuresed on IV Bumex with prn Metolazone. He is net negative 1.2L throughout his hospital stay and weight on day of discharge was 179lbs down from 186lbs on admit. We continued his home dose of Bumex 4mg BID but increased his prn Metolazone to 10mg. He received a dose of Epo while inpatient and he has plans to follow up with Heme/Onc to get outpatient therapy arranged.      2D Echo with CFD done on 3/26/2024  LVAD: There is a Heartmate III LVAD running at 5000 RPM. The aortic valve does not open. The ventricular septum is at midline. Inflow cannula well seated at the apex. Outflow graft not visualized.   Left Ventricle: The left ventricle is moderately dilated. Normal wall thickness. Global hypokinesis present. Septal motion is abnormal. There is severely reduced systolic function with a visually estimated ejection fraction of 5 - 10%. There is diastolic dysfunction but grade cannot be determined.   Right Ventricle: Mild right ventricular enlargement. Wall thickness is normal. Right ventricle wall motion is normal. Systolic function is normal. Pacemaker lead present in the ventricle.   Left Atrium: Left atrium is severely dilated.   Right Atrium: Right atrium is moderately dilated.   Mitral Valve: The mitral valve is repaired with an  Noble stitch. There is mild regurgitation.   IVC/SVC: Normal venous pressure at 3 mmHg.              * Acute renal failure superimposed on stage 4 chronic kidney disease  Mr. Radha Abbott is a 65 yo male with stage D HFrEF, ICMP who underwent HM3 placement as DT, DM2, CKD4, HTN, E.faecalis bacteremia who presented due to c/o worsening generalized weakness since 6/10. Admitted for worsening renal function with a BUN>100/Cr. 9.9.     Plan:   - CT Ab/pel: Kidneys/ Ureters: Normal in size and location.  Mild bilateral perinephric stranding, a nonspecific finding and similar to prior.  No hydronephrosis or nephrolithiasis. No ureteral dilatation.  - Nephrology consulted, appreciate recs. To start intermittent HD today ASAP  - [place] cath in place for accurate I&O's  - RIJ trialysis placed for hemodynamics monitoring and HD  - Renally dosing medications      Hypoglycemia  -Appreciate Endocrine's help  -Titrate D10W to keep glucose > 80    Bacteremia due to Enterococcus  He completed a 6 week therapy for Enterococcus bacteremia with ampicillin and rocephin ending 5/30/24a and was switched to Amoxicillin for suppression    Anticoagulant long-term use  Cont warfarin    Anemia  Chronic with no acute bleeding, multifactorial 2/2 CKD4 and chronic anticoagulation    -Hgb 6.3, normally in the 6's - 7's  -Iron panel done. Iron deficient but Ferritin is elevated at 1186  -Continue to trend    Adjustment disorder with depressed mood  Cont SSRI    LVAD (left ventricular assist device) present  Procedure: Device Interrogation Including analysis of device parameters  Current Settings: Ventricular Assist Device  Review of device function is stable        6/12/2024     5:01 AM 6/12/2024     4:01 AM 6/12/2024     3:01 AM 6/12/2024     2:01 AM 6/12/2024     1:00 AM 6/12/2024    12:10 AM 6/11/2024     7:35 PM   TXP LVAD INTERROGATIONS   Type HeartMate3 HeartMate3 HeartMate3 HeartMate3 HeartMate3 HeartMate3 HeartMate3   Flow 4.1 4.2 4 4 4.1  4.4 4.6   Speed 5000 5000 5000 5000 5000 5000 5000   PI 4.8 4.2 5.3 6.3 5.6 3.7 2.8   Power ([place]) 3.4 3.4 3.3 3.4 3.4 3.4 3.5   Pulsatility Intermittent pulse Intermittent pulse Intermittent pulse Intermittent pulse   Intermittent pulse         PAF (paroxysmal atrial fibrillation)  -Continue amiodarone and Coumadin      Acute on chronic combined systolic and diastolic heart failure  Owing to his advanced kidney disease and significantly elevated creat, he is not on a  ACEI/ARB/ARNI or MRA. He is also not on a beta blocker due to RV dysfunction.     Plan:  - CVP 23. Bumex 4 mg IVP bid changed to Bumex 2 mg/hr with increase in UOP from ~ 25 cc/hr to 50 cc/hr  - will monitor hemodynamics    CAD (coronary artery disease)  -Continue statin      Uninterrupted Critical Care/Counseling Time (not including procedures): 65 minutes      Fany Abb[place] [id number]  Heart Transplant  [facility] - Cardiac [facility]

## 2024-06-12 NOTE — ASSESSMENT & PLAN NOTE
Procedure: Device Interrogation Including analysis of device parameters  Current Settings: Ventricular Assist Device  Review of device function is stable/unstable stable        6/11/2024     7:35 PM 5/31/2024    12:48 PM 5/31/2024     8:03 AM 5/31/2024     5:00 AM 5/31/2024     1:01 AM 5/30/2024     7:47 PM 5/30/2024     4:33 PM   TXP LVAD INTERROGATIONS   Type HeartMate3 HeartMate3 HeartMate3 HeartMate3 HeartMate3 HeartMate3 HeartMate3   Flow 4.6 4.3 3.9 3.9 3.8 3.8 4   Speed 5000 5000 5050 5050 5050 5050 5000   PI 2.8 3.7 4.5 4.6 5.7 6.3 5   Power ([place]) 3.5 3.5 3.5 3.3 3.4 3.4 3.5   LSL   4600 4600 4600 4600 4600   Pulsatility Intermittent pulse Intermittent pulse Pulse Intermittent pulse Intermittent pulse  Intermittent pulse

## 2024-06-12 NOTE — H&P
[facility] - Cardiac Int[facility]  Heart Transplant  H&P    Patient Name: Radha Abbott  MRN: [id number]  Admission Date: 6/11/2024  Attending Physician: Brayan Ocampo MD  Primary Care Provider: Vasu Kong MD  Principal Problem:Acute renal failure superimposed on stage 4 chronic kidney disease    Subjective:     History of Present Illness:  Mr. Radha Abbott is a 63 yo male with stage D HFrEF, ICMP who underwent HM3 placement as DT (12/1/23) w/ a hospital stay complicated by vasoplegia(requiring Giaprezza), debility, malnutrition/impaired swallowing, and renal failure requiring HD (since weaned off) w/ recent discharge for ADHF presented to the ED with wife due to c/o worsening generalized weakness since 6/10. Per wife patient was mostly sleeping during the day despite sleeping 10 hrs at night. Patient also admits decreased appetite and decreased urine output despite taking his medications as prescribed. He has not urinated since 6/11 at 2pm. He has chronic shortness of breath on exertion. Denies confusion, syncope, diarrhea, constipation, N/V, dysuria, or other complaints. Patient had his prior tunnel cath removed used for HD about 2 weeks ago. He completed a 6 week therapy for Enterococcus bacteremia with ampicillin and rocephin ending 5/30/24 and was switched to PO antibiotics.     Off note, patient was recently started by his PCP on glimepiride 1mg and unclear of BG trends during the day. Per patient last fasting  on 6/11.    On prior hospital stay nephrology consulted during hospital stay due to elevated renal function. They feel he is close to being a dialysis patient. He diuresed on IV Bumex with prn Metolazone. He is net negative 1.2L throughout his hospital stay and weight on day of discharge was 179lbs down from 186lbs on admit. We continued his home dose of Bumex 4mg BID but increased his prn Metolazone to 10mg. He received a dose of Epo while inpatient     2D Echo with CFD done on  3/26/2024  LVAD: There is a Heartmate III LVAD running at 5000 RPM. The aortic valve does not open. The ventricular septum is at midline. Inflow cannula well seated at the apex. Outflow graft not visualized.   Left Ventricle: The left ventricle is moderately dilated. Normal wall thickness. Global hypokinesis present. Septal motion is abnormal. There is severely reduced systolic function with a visually estimated ejection fraction of 5 - 10%. There is diastolic dysfunction but grade cannot be determined.   Right Ventricle: Mild right ventricular enlargement. Wall thickness is normal. Right ventricle wall motion is normal. Systolic function is normal. Pacemaker lead present in the ventricle.   Left Atrium: Left atrium is severely dilated.   Right Atrium: Right atrium is moderately dilated.   Mitral Valve: The mitral valve is repaired with an Noble stitch. There is mild regurgitation.   IVC/SVC: Normal venous pressure at 3 mmHg.              Past Medical History:   Diagnosis Date    CAD (coronary artery disease)     CHF (congestive heart failure)     Diabetes mellitus     HFrEF (heart failure with reduced ejection fraction)     ICD (implantable cardioverter-defibrillator) in place     MI, old        Past Surgical History:   Procedure Laterality Date    ANGIOPLASTY-VENOUS ARTERY Right 12/1/2023    Procedure: ANGIOPLASTY-VENOUS ARTERY, RIGHT FEMORAL;  Surgeon: Yuri Washington MD;  Location: [facility] OR [address] [place] [address];  Service: Cardiovascular;  Laterality: Right;    AORTIC VALVULOPLASTY N/A 12/1/2023    Procedure: REPAIR, AORTIC VALVE;  Surgeon: Yuri Washington MD;  Location: [facility] OR [address] [place] [address];  Service: Cardiovascular;  Laterality: N/A;    CARDIAC SURGERY      CLOSURE N/A 12/1/2023    Procedure: CLOSURE, TEMPORARY;  Surgeon: Yuri Washington MD;  [facility];  Service: Cardiovascular;  Laterality: N/A;    DRAINAGE OF PLEURAL EFFUSION  12/4/2023    Procedure: DRAINAGE, PLEURAL EFFUSION;  Surgeon: Yuri Washington MD;   Location: [facility] OR [facility] FLR;  Service: Cardiovascular;;    INSERTION OF GRAFT TO PERICARDIUM  12/4/2023    Procedure: INSERTION, GRAFT, PERICARDIUM;  Surgeon: Yuri Washington MD;  Location: [facility] OR [facility] FLR;  Service: Cardiovascular;;    INSERTION OF INTRA-AORTIC BALLOON ASSIST DEVICE Right 11/21/2023    Procedure: INSERTION, INTRA-AORTIC BALLOON PUMP;  Surgeon: Finn Cohn MD;  Location: [facility] CATH LAB;  Service: Cardiology;  Laterality: Right;    LEFT VENTRICULAR ASSIST DEVICE Left 12/1/2023    Procedure: INSERTION-LEFT VENTRICULAR ASSIST DEVICE;  Surgeon: Yuri Washington MD;  [facility];  Service: Cardiovascular;  Laterality: Left;  REDO STERNOTOMY - REDO SAW NEEDED FOR CASE    LYSIS OF ADHESIONS  12/1/2023    Procedure: LYSIS, ADHESIONS;  Surgeon: Yuri Washington MD;  [facility];  Service: Cardiovascular;;    PLACEMENT OF SWAN ROLANDO CATHETER WITH IMAGING GUIDANCE  11/20/2023    Procedure: INSERTION, CATHETER, SWAN-ROLANDO, WITH IMAGING GUIDANCE;  Surgeon: Sajan Hurley MD;  Location: [facility] CATH LAB;  Service: Cardiology;;    REMOVAL OF TUNNELED CENTRAL VENOUS CATHETER (CVC) N/A 3/1/2024    Procedure: REMOVAL, CATHETER, CENTRAL VENOUS, TUNNELED;  Surgeon: Seble Aguilar MD;  Location: [facility] CATH LAB;  Service: Interventional Nephrology;  Laterality: N/A;    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 12/1/2023    Procedure: REPAIR, ANEURYSM, ARTERY, FEMORAL;  Surgeon: Yuri Washington MD;  [address]  Service: Cardiovascular;  Laterality: Right;  Right Femoral Artery Repair    RIGHT HEART CATHETERIZATION Right 10/10/2023    Procedure: INSERTION, CATHETER, RIGHT HEART;  Surgeon: Bin Gandhi MD;  Location: [facility] CATH LAB;  Service: Cardiology;  Laterality: Right;    RIGHT HEART CATHETERIZATION Right 10/13/2023    Procedure: INSERTION, CATHETER, RIGHT HEART;  Surgeon: Walter Mcintyre MD;  Location: [facility] CATH LAB;  Service: Cardiology;  Laterality: Right;     RIGHT HEART CATHETERIZATION  11/13/2023    RIGHT HEART CATHETERIZATION Right 11/13/2023    Procedure: INSERTION, CATHETER, RIGHT HEART;  Surgeon: Juventino Bermudez Jr., MD;  Location: [facility] CATH LAB;  Service: Cardiology;  Laterality: Right;    RIGHT HEART CATHETERIZATION Right 11/20/2023    Procedure: INSERTION, CATHETER, RIGHT HEART;  Surgeon: Sajan Hurley MD;  Location: [facility] CATH LAB;  Service: Cardiology;  Laterality: Right;    RIGHT HEART CATHETERIZATION Right 1/22/2024    Procedure: INSERTION, CATHETER, RIGHT HEART;  Surgeon: Brayan Ocampo MD;  Location: [facility] CATH LAB;  Service: Cardiology;  Laterality: Right;    STERNAL WOUND CLOSURE N/A 12/4/2023    Procedure: CLOSURE, WOUND, STERNUM;  Surgeon: Yuri Washington MD;  Location: [facility] OR [facility]LR;  Service: Cardiovascular;  Laterality: N/A;    STERNOTOMY N/A 12/1/2023    Procedure: STERNOTOMY, REDO;  Surgeon: Yuri Washington MD;  Location: [facility] OR [facility] FLR;  Service: Cardiovascular;  Laterality: N/A;    VALVULOPLASTY, MITRAL VALVE N/A 12/1/2023    Procedure: VALVULOPLASTY, MITRAL VALVE;  Surgeon: Yuri Washington MD;  Location: [facility] OR [facility]LR;  Service: Cardiovascular;  Laterality: N/A;       Review of patient's allergies indicates:  No Known Allergies    Current Facility-Administered Medications   Medication    amiodarone tablet 400 mg    amoxicillin capsule 500 mg    atorvastatin tablet 40 mg    bumetanide injection 4 mg    famotidine tablet 20 mg    folic acid tablet 1 mg    trazodone split tablet 25 mg    venlafaxine tablet 37.5 mg    vitamin D 1000 units tablet 1,000 Units    warfarin (COUMADIN) tablet 2.5 mg     Family History    None       Tobacco Use    Smoking status: Former     Current packs/day: 0.50     Types: Cigarettes    Smokeless tobacco: Not on file   Substance and Sexual Activity    Alcohol use: Yes     Comment: rarely    Drug use: No    Sexual activity: Not Currently     Partners: Female     Review of Systems   Constitutional:   Negative for chills and diaphoresis.   Respiratory:  Positive for shortness of breath. Negative for apnea, cough and chest tightness.    Cardiovascular:  Negative for chest pain, palpitations and leg swelling.   Gastrointestinal:  Negative for abdominal distention, abdominal pain, blood in stool, constipation, rectal pain and vomiting.   Genitourinary:  Negative for dysuria.   Musculoskeletal:  Negative for back pain.   Neurological:  Negative for dizziness, light-headedness and headaches.   Psychiatric/Behavioral:  The patient is not nervous/anxious.    Objective:     Vital Signs (Most Recent):  Temp: 97.8 °F (36.6 °C) (06/12/24 0015)  Pulse: 78 (06/12/24 0015)  Resp: 15 (06/12/24 0101)  BP: (!) 78/0 (06/12/24 0101)  SpO2: 98 % (06/12/24 0101) Vital Signs (24h Range):  Temp:  [97.8 °F (36.6 °C)-98 °F (36.7 °C)] 97.8 °F (36.6 °C)  Pulse:  [74-87] 78  Resp:  [15-24] 15  SpO2:  [94 %-99 %] 98 %  BP: ()/(0-55) 78/0     Patient Vitals for the past 72 hrs (Last 3 readings):   Weight   06/11/24 1549 81.2 kg (179 lb)     Body mass index is 23.62 kg/m².    No intake or output data in the 24 hours ending 06/12/24 0221       Physical Exam  Vitals and nursing note reviewed.   Constitutional:       General: He is not in acute distress.     Appearance: He is not ill-appearing, toxic-appearing or diaphoretic.   HENT:      Head: Normocephalic and atraumatic.      Nose: Nose normal.      Mouth/Throat:      Mouth: Mucous membranes are moist.      Pharynx: Oropharynx is clear.   Cardiovascular:      Rate and Rhythm: Normal rate and regular rhythm.      Comments: Vad hum. Decreased BL radial pulses and BL LE  Pulmonary:      Effort: Pulmonary effort is normal. No respiratory distress.      Comments: Decreased lung sounds BL  Abdominal:      General: Abdomen is flat. There is no distension.      Tenderness: There is no abdominal tenderness.      Comments: Driveline site clean   Musculoskeletal:         General: No swelling or  "tenderness. Normal range of motion.      Cervical back: Normal range of motion and neck supple.   Skin:     General: Skin is warm and dry.      Capillary Refill: Capillary refill takes 2 to 3 seconds.      Coloration: Skin is pale.   Neurological:      General: No focal deficit present.      Mental Status: He is alert and oriented to person, place, and time.      Comments: tremor   Psychiatric:         Mood and Affect: Mood normal.         Thought Content: Thought content normal.        Significant Labs:  CBC:  Recent Labs   Lab 06/11/24  1605   WBC 7.04   RBC 2.20*   HGB 6.3*   HCT 19.8*      MCV 90   MCH 28.6   MCHC 31.8*     BNP:  Recent Labs   Lab 06/11/24  1933   BNP 1,388*     CMP:  Recent Labs   Lab 06/11/24  1605 06/11/24  2227   GLU 84 38*   CALCIUM 8.7 9.1   ALBUMIN 2.6*  --    PROT 7.6  --     139   K 4.1 4.2   CO2 20* 21*   CL 99 100   * 117*   CREATININE 9.9* 9.8*   ALKPHOS 142*  --    ALT 59*  --    AST 58*  --    BILITOT 0.2  --       Coagulation:   Recent Labs   Lab 06/07/24  0000 06/11/24  1933   INR 2.6 2.4*   APTT  --  46.5*     LDH:  No results for input(s): "LDH" in the last 72 hours.  Microbiology:  Microbiology Results (last 7 days)       Procedure Component Value Units Date/Time    Blood culture #1 **CANNOT BE ORDERED STAT** [[id number]] Collected: 06/11/24 2122    Order Status: Sent Specimen: Blood from Peripheral, Antecubital, Right Updated: 06/11/24 2132    Blood culture #2 **CANNOT BE ORDERED STAT** [[id number]] Collected: 06/11/24 2121    Order Status: Sent Specimen: Blood from Peripheral, Hand, Left Updated: 06/11/24 2132            I have reviewed all pertinent labs within the past 24 hours.    Diagnostic Results:  I have reviewed all pertinent imaging results/findings within the past 24 hours.    Assessment/Plan:     * Acute renal failure superimposed on stage 4 chronic kidney disease  Mr. Radha Abbott is a 63 yo male with stage D HFrEF, ICMP who underwent HM3 " placement as DT, DM2, CKD4, HTN, E.faecalis bacteremia who presented due to c/o worsening generalized weakness since 6/10. Admitted for worsening renal function with a BUN>100/Cr. 9.9.     Plan:   - CT Ab/pel: Kidneys/ Ureters: Normal in size and location.  Mild bilateral perinephric stranding, a nonspecific finding and similar to prior.  No hydronephrosis or nephrolithiasis. No ureteral dilatation.   - Nephrology consulted, appreciate recs.   - Will need goodman placement for accurate I & Os.   - RIJ trialysis placed for hemodynamics monitoring and possible future HD  - Renally dosing medications  - Will continue bumex 4mg BID IV for now. Per nephrology no emergent HD needed now.     Hypoglycemia  Poor intake at home. Encouraged oral intake.    Bacteremia due to Enterococcus  He completed a 6 week therapy for Enterococcus bacteremia with ampicillin and rocephin ending 5/30/24a and was switched to PO antibiotics.     Anticoagulant long-term use  Cont warfarin    Anemia  Chronic with no acute bleeding, multifactorial 2/2 CKD4 and chronic anticoagulation    -Will recheck iron levels    Adjustment disorder with depressed mood  Cont SSRI    LVAD (left ventricular assist device) present  Procedure: Device Interrogation Including analysis of device parameters  Current Settings: Ventricular Assist Device  Review of device function is stable/unstable stable        6/11/2024     7:35 PM 5/31/2024    12:48 PM 5/31/2024     8:03 AM 5/31/2024     5:00 AM 5/31/2024     1:01 AM 5/30/2024     7:47 PM 5/30/2024     4:33 PM   TXP LVAD INTERROGATIONS   Type HeartMate3 HeartMate3 HeartMate3 HeartMate3 HeartMate3 HeartMate3 HeartMate3   Flow 4.6 4.3 3.9 3.9 3.8 3.8 4   Speed 5000 5000 5050 5050 5050 5050 5000   PI 2.8 3.7 4.5 4.6 5.7 6.3 5   Power ([place]) 3.5 3.5 3.5 3.3 3.4 3.4 3.5   LSL   4600 4600 4600 4600 4600   Pulsatility Intermittent pulse Intermittent pulse Pulse Intermittent pulse Intermittent pulse  Intermittent pulse          PAF (paroxysmal atrial fibrillation)  Continue amiodarone and warfarin for anticoagulation    Type 2 diabetes mellitus without complication, without long-term current use of insulin  Will hold off antiglycemic meds due to hypoglycemia. Off note, patient was recently started by his PCP on glimepiride 1mg and unclear of BG trends during the day. Per patient last fasting  on 6/11.    Acute on chronic combined systolic and diastolic heart failure  Owing to his advanced kidney disease and significantly elevated creat, he is not on a  ACEI/ARB/ARNI or MRA. He is also not on a beta blocker due to RV dysfunction.     Plan:  - will attempt to diurese   - will monitor hemodynamics    CAD (coronary artery disease)  Continue high dose statin        Bernardino Brand MD  Heart Transplant  [facility] - Cardiac Intensive Care

## 2024-06-12 NOTE — ED NOTES
Assumed care for pt after recieving report from nightshift RN. Pt. resting in bed in NAD, RR e/u. Vital signs stable and within desired limits at this time of assessment. Pt. offered bathroom assistance and denies need at this time. Explanation of care/wait provided. Pt verbalizes no needs at this time. Bed in low, locked position with rails up and call bell in reach. Pt's white board updated with today's care team and plan.     Patient identifiers for Radha Abbott 64 y.o. male checked and correct.  Chief Complaint   Patient presents with    LVAD     Pain to L side of abd, lvad coord evin abernathy notified      Past Medical History:   Diagnosis Date    CAD (coronary artery disease)     CHF (congestive heart failure)     Diabetes mellitus     HFrEF (heart failure with reduced ejection fraction)     ICD (implantable cardioverter-defibrillator) in place     MI, old      Allergies reported: Review of patient's allergies indicates:  No Known Allergies      LOC: Patient is awake, alert, and aware of environment with an appropriate affect. Patient is oriented x 4 and speaking appropriately.  APPEARANCE: Patient resting comfortably and in no acute distress. Patient is clean and well groomed, patient's clothing is properly fastened.  HEENT: WDL  SKIN: The skin is warm and dry. Patient has normal skin turgor and moist mucus membranes.   MUSCULOSKELETAL: Patient is moving all extremities well, no obvious deformities noted. Pulses intact.   RESPIRATORY: Airway is open and patent. Respirations are spontaneous and non-labored with normal effort and rate.  CARDIAC: Patient has a normal rate and rhythm. 74 on cardiac monitor. No peripheral edema noted. Denies chest pain and SOB at at time of assessment.   ABDOMEN: No distention noted. Soft and non-tender upon palpation. LVAD dressing C,D,I.  NEUROLOGICAL: pupils 3mm, PERRL. Facial expression is symmetrical. Hand grasps are equal bilaterally. Normal sensation in all extremities when  touched with finger.

## 2024-06-12 NOTE — ASSESSMENT & PLAN NOTE
Owing to his advanced kidney disease and significantly elevated creat, he is not on a  ACEI/ARB/ARNI or MRA. He is also not on a beta blocker due to RV dysfunction.     Plan:  - will attempt to diurese   - will monitor hemodynamics

## 2024-06-12 NOTE — ASSESSMENT & PLAN NOTE
He completed a 6 week therapy for Enterococcus bacteremia with ampicillin and rocephin ending 5/30/24a and was switched to Amoxicillin for suppression

## 2024-06-13 LAB
ALBUMIN SERPL BCP-MCNC: 2.3 G/DL (ref 3.5–5.2)
ALBUMIN SERPL BCP-MCNC: 2.4 G/DL (ref 3.5–5.2)
ALLENS TEST: ABNORMAL
ALLENS TEST: ABNORMAL
ANION GAP SERPL CALC-SCNC: 12 MMOL/L (ref 8–16)
ANION GAP SERPL CALC-SCNC: 12 MMOL/L (ref 8–16)
ANION GAP SERPL CALC-SCNC: 13 MMOL/L (ref 8–16)
ANION GAP SERPL CALC-SCNC: 13 MMOL/L (ref 8–16)
ANION GAP SERPL CALC-SCNC: 14 MMOL/L (ref 8–16)
APTT PPP: 48.4 SEC (ref 21–32)
BACTERIA UR CULT: NO GROWTH
BASOPHILS # BLD AUTO: 0.01 K/UL (ref 0–0.2)
BASOPHILS NFR BLD: 0.1 % (ref 0–1.9)
BUN SERPL-MCNC: 41 MG/DL (ref 8–23)
BUN SERPL-MCNC: 41 MG/DL (ref 8–23)
BUN SERPL-MCNC: 74 MG/DL (ref 8–23)
BUN SERPL-MCNC: 75 MG/DL (ref 8–23)
BUN SERPL-MCNC: 75 MG/DL (ref 8–23)
CALCIUM SERPL-MCNC: 8.6 MG/DL (ref 8.7–10.5)
CALCIUM SERPL-MCNC: 8.6 MG/DL (ref 8.7–10.5)
CALCIUM SERPL-MCNC: 8.7 MG/DL (ref 8.7–10.5)
CALCIUM SERPL-MCNC: 8.9 MG/DL (ref 8.7–10.5)
CALCIUM SERPL-MCNC: 8.9 MG/DL (ref 8.7–10.5)
CHLORIDE SERPL-SCNC: 95 MMOL/L (ref 95–110)
CHLORIDE SERPL-SCNC: 95 MMOL/L (ref 95–110)
CHLORIDE SERPL-SCNC: 97 MMOL/L (ref 95–110)
CHLORIDE SERPL-SCNC: 99 MMOL/L (ref 95–110)
CHLORIDE SERPL-SCNC: 99 MMOL/L (ref 95–110)
CO2 SERPL-SCNC: 19 MMOL/L (ref 23–29)
CREAT SERPL-MCNC: 3.8 MG/DL (ref 0.5–1.4)
CREAT SERPL-MCNC: 3.8 MG/DL (ref 0.5–1.4)
CREAT SERPL-MCNC: 6.4 MG/DL (ref 0.5–1.4)
CREAT SERPL-MCNC: 6.4 MG/DL (ref 0.5–1.4)
CREAT SERPL-MCNC: 7 MG/DL (ref 0.5–1.4)
DELSYS: ABNORMAL
DIFFERENTIAL METHOD BLD: ABNORMAL
EOSINOPHIL # BLD AUTO: 0.1 K/UL (ref 0–0.5)
EOSINOPHIL NFR BLD: 0.6 % (ref 0–8)
ERYTHROCYTE [DISTWIDTH] IN BLOOD BY AUTOMATED COUNT: 17.3 % (ref 11.5–14.5)
EST. GFR  (NO RACE VARIABLE): 17.1 ML/MIN/1.73 M^2
EST. GFR  (NO RACE VARIABLE): 17.1 ML/MIN/1.73 M^2
EST. GFR  (NO RACE VARIABLE): 8.2 ML/MIN/1.73 M^2
EST. GFR  (NO RACE VARIABLE): 9.2 ML/MIN/1.73 M^2
EST. GFR  (NO RACE VARIABLE): 9.2 ML/MIN/1.73 M^2
FLOW: 2
GLUCOSE SERPL-MCNC: 110 MG/DL (ref 70–110)
GLUCOSE SERPL-MCNC: 126 MG/DL (ref 70–110)
GLUCOSE SERPL-MCNC: 126 MG/DL (ref 70–110)
GLUCOSE SERPL-MCNC: 148 MG/DL (ref 70–110)
GLUCOSE SERPL-MCNC: 148 MG/DL (ref 70–110)
HCO3 UR-SCNC: 21.9 MMOL/L (ref 24–28)
HCO3 UR-SCNC: 22.5 MMOL/L (ref 24–28)
HCT VFR BLD AUTO: 20.4 % (ref 40–54)
HGB BLD-MCNC: 6.5 G/DL (ref 14–18)
IMM GRANULOCYTES # BLD AUTO: 0.08 K/UL (ref 0–0.04)
IMM GRANULOCYTES NFR BLD AUTO: 0.9 % (ref 0–0.5)
INR PPP: 2.8 (ref 0.8–1.2)
LDH SERPL L TO P-CCNC: 422 U/L (ref 110–260)
LYMPHOCYTES # BLD AUTO: 0.3 K/UL (ref 1–4.8)
LYMPHOCYTES NFR BLD: 3.6 % (ref 18–48)
MAGNESIUM SERPL-MCNC: 1.9 MG/DL (ref 1.6–2.6)
MAGNESIUM SERPL-MCNC: 1.9 MG/DL (ref 1.6–2.6)
MAGNESIUM SERPL-MCNC: 2 MG/DL (ref 1.6–2.6)
MAGNESIUM SERPL-MCNC: 2 MG/DL (ref 1.6–2.6)
MAGNESIUM SERPL-MCNC: 2.1 MG/DL (ref 1.6–2.6)
MCH RBC QN AUTO: 28.6 PG (ref 27–31)
MCHC RBC AUTO-ENTMCNC: 31.9 G/DL (ref 32–36)
MCV RBC AUTO: 90 FL (ref 82–98)
MODE: ABNORMAL
MONOCYTES # BLD AUTO: 0.6 K/UL (ref 0.3–1)
MONOCYTES NFR BLD: 6.2 % (ref 4–15)
NEUTROPHILS # BLD AUTO: 8 K/UL (ref 1.8–7.7)
NEUTROPHILS NFR BLD: 88.6 % (ref 38–73)
NRBC BLD-RTO: 0 /100 WBC
PCO2 BLDA: 42.4 MMHG (ref 35–45)
PCO2 BLDA: 42.5 MMHG (ref 35–45)
PH SMN: 7.32 [PH] (ref 7.35–7.45)
PH SMN: 7.33 [PH] (ref 7.35–7.45)
PHOSPHATE SERPL-MCNC: 3.5 MG/DL (ref 2.7–4.5)
PHOSPHATE SERPL-MCNC: 3.5 MG/DL (ref 2.7–4.5)
PHOSPHATE SERPL-MCNC: 6.1 MG/DL (ref 2.7–4.5)
PHOSPHATE SERPL-MCNC: 6.1 MG/DL (ref 2.7–4.5)
PHOSPHATE SERPL-MCNC: 7.1 MG/DL (ref 2.7–4.5)
PLATELET # BLD AUTO: 298 K/UL (ref 150–450)
PMV BLD AUTO: 9.3 FL (ref 9.2–12.9)
PO2 BLDA: 27 MMHG (ref 40–60)
PO2 BLDA: 34 MMHG (ref 40–60)
POC BE: -3 MMOL/L
POC BE: -4 MMOL/L
POC SATURATED O2: 45 % (ref 95–100)
POC SATURATED O2: 62 % (ref 95–100)
POC TCO2: 23 MMOL/L (ref 24–29)
POC TCO2: 24 MMOL/L (ref 24–29)
POCT GLUCOSE: 121 MG/DL (ref 70–110)
POCT GLUCOSE: 123 MG/DL (ref 70–110)
POCT GLUCOSE: 127 MG/DL (ref 70–110)
POCT GLUCOSE: 135 MG/DL (ref 70–110)
POCT GLUCOSE: 136 MG/DL (ref 70–110)
POCT GLUCOSE: 141 MG/DL (ref 70–110)
POCT GLUCOSE: 155 MG/DL (ref 70–110)
POCT GLUCOSE: 157 MG/DL (ref 70–110)
POCT GLUCOSE: 166 MG/DL (ref 70–110)
POCT GLUCOSE: 209 MG/DL (ref 70–110)
POCT GLUCOSE: 71 MG/DL (ref 70–110)
POTASSIUM SERPL-SCNC: 4.2 MMOL/L (ref 3.5–5.1)
POTASSIUM SERPL-SCNC: 4.2 MMOL/L (ref 3.5–5.1)
POTASSIUM SERPL-SCNC: 4.4 MMOL/L (ref 3.5–5.1)
POTASSIUM SERPL-SCNC: 4.4 MMOL/L (ref 3.5–5.1)
POTASSIUM SERPL-SCNC: 4.5 MMOL/L (ref 3.5–5.1)
PROTHROMBIN TIME: 29.2 SEC (ref 9–12.5)
RBC # BLD AUTO: 2.27 M/UL (ref 4.6–6.2)
SAMPLE: ABNORMAL
SAMPLE: ABNORMAL
SITE: ABNORMAL
SITE: ABNORMAL
SODIUM SERPL-SCNC: 127 MMOL/L (ref 136–145)
SODIUM SERPL-SCNC: 127 MMOL/L (ref 136–145)
SODIUM SERPL-SCNC: 130 MMOL/L (ref 136–145)
SP02: 99
WBC # BLD AUTO: 9.06 K/UL (ref 3.9–12.7)

## 2024-06-13 PROCEDURE — 90945 DIALYSIS ONE EVALUATION: CPT

## 2024-06-13 PROCEDURE — 83615 LACTATE (LD) (LDH) ENZYME: CPT | Performed by: STUDENT IN AN ORGANIZED HEALTH CARE EDUCATION/TRAINING PROGRAM

## 2024-06-13 PROCEDURE — 99292 CRITICAL CARE ADDL 30 MIN: CPT | Mod: ,,, | Performed by: INTERNAL MEDICINE

## 2024-06-13 PROCEDURE — 99291 CRITICAL CARE FIRST HOUR: CPT | Mod: ,,, | Performed by: INTERNAL MEDICINE

## 2024-06-13 PROCEDURE — 99232 SBSQ HOSP IP/OBS MODERATE 35: CPT | Mod: ,,, | Performed by: PHYSICIAN ASSISTANT

## 2024-06-13 PROCEDURE — 36410 VNPNXR 3YR/> PHY/QHP DX/THER: CPT

## 2024-06-13 PROCEDURE — 76937 US GUIDE VASCULAR ACCESS: CPT

## 2024-06-13 PROCEDURE — 82803 BLOOD GASES ANY COMBINATION: CPT

## 2024-06-13 PROCEDURE — 85730 THROMBOPLASTIN TIME PARTIAL: CPT | Performed by: STUDENT IN AN ORGANIZED HEALTH CARE EDUCATION/TRAINING PROGRAM

## 2024-06-13 PROCEDURE — 99291 CRITICAL CARE FIRST HOUR: CPT | Mod: FS,,, | Performed by: NURSE PRACTITIONER

## 2024-06-13 PROCEDURE — C1751 CATH, INF, PER/CENT/MIDLINE: HCPCS

## 2024-06-13 PROCEDURE — 27000221 HC OXYGEN, UP TO 24 HOURS

## 2024-06-13 PROCEDURE — 21400001 HC TELEMETRY ROOM

## 2024-06-13 PROCEDURE — 63600175 PHARM REV CODE 636 W HCPCS: Performed by: NURSE PRACTITIONER

## 2024-06-13 PROCEDURE — 63600175 PHARM REV CODE 636 W HCPCS: Performed by: STUDENT IN AN ORGANIZED HEALTH CARE EDUCATION/TRAINING PROGRAM

## 2024-06-13 PROCEDURE — 05HC33Z INSERTION OF INFUSION DEVICE INTO LEFT BASILIC VEIN, PERCUTANEOUS APPROACH: ICD-10-PCS | Performed by: INTERNAL MEDICINE

## 2024-06-13 PROCEDURE — 85025 COMPLETE CBC W/AUTO DIFF WBC: CPT | Performed by: STUDENT IN AN ORGANIZED HEALTH CARE EDUCATION/TRAINING PROGRAM

## 2024-06-13 PROCEDURE — 97535 SELF CARE MNGMENT TRAINING: CPT

## 2024-06-13 PROCEDURE — 25000003 PHARM REV CODE 250: Performed by: NURSE PRACTITIONER

## 2024-06-13 PROCEDURE — 25000003 PHARM REV CODE 250: Performed by: STUDENT IN AN ORGANIZED HEALTH CARE EDUCATION/TRAINING PROGRAM

## 2024-06-13 PROCEDURE — 92610 EVALUATE SWALLOWING FUNCTION: CPT

## 2024-06-13 PROCEDURE — 83735 ASSAY OF MAGNESIUM: CPT | Mod: 91 | Performed by: NURSE PRACTITIONER

## 2024-06-13 PROCEDURE — 80100008 HC CRRT DAILY MAINTENANCE

## 2024-06-13 PROCEDURE — 94761 N-INVAS EAR/PLS OXIMETRY MLT: CPT | Mod: XB

## 2024-06-13 PROCEDURE — 93750 INTERROGATION VAD IN PERSON: CPT | Mod: ,,, | Performed by: INTERNAL MEDICINE

## 2024-06-13 PROCEDURE — 85610 PROTHROMBIN TIME: CPT | Performed by: STUDENT IN AN ORGANIZED HEALTH CARE EDUCATION/TRAINING PROGRAM

## 2024-06-13 PROCEDURE — 99233 SBSQ HOSP IP/OBS HIGH 50: CPT | Mod: ,,, | Performed by: INTERNAL MEDICINE

## 2024-06-13 PROCEDURE — 99900035 HC TECH TIME PER 15 MIN (STAT)

## 2024-06-13 PROCEDURE — 27000248 HC VAD-ADDITIONAL DAY

## 2024-06-13 PROCEDURE — 80069 RENAL FUNCTION PANEL: CPT | Mod: 91 | Performed by: NURSE PRACTITIONER

## 2024-06-13 PROCEDURE — 83735 ASSAY OF MAGNESIUM: CPT | Performed by: STUDENT IN AN ORGANIZED HEALTH CARE EDUCATION/TRAINING PROGRAM

## 2024-06-13 RX ORDER — HYDROCODONE BITARTRATE AND ACETAMINOPHEN 500; 5 MG/1; MG/1
TABLET ORAL CONTINUOUS
Status: ACTIVE | OUTPATIENT
Start: 2024-06-13 | End: 2024-06-14

## 2024-06-13 RX ORDER — MAGNESIUM SULFATE HEPTAHYDRATE 40 MG/ML
2 INJECTION, SOLUTION INTRAVENOUS
Status: DISPENSED | OUTPATIENT
Start: 2024-06-13 | End: 2024-06-14

## 2024-06-13 RX ORDER — TRAMADOL HYDROCHLORIDE 50 MG/1
50 TABLET ORAL ONCE
Status: COMPLETED | OUTPATIENT
Start: 2024-06-13 | End: 2024-06-13

## 2024-06-13 RX ADMIN — HYDRALAZINE HYDROCHLORIDE 100 MG: 50 TABLET ORAL at 05:06

## 2024-06-13 RX ADMIN — ACETAMINOPHEN 650 MG: 325 TABLET ORAL at 11:06

## 2024-06-13 RX ADMIN — AMOXICILLIN 500 MG: 500 CAPSULE ORAL at 08:06

## 2024-06-13 RX ADMIN — SODIUM CHLORIDE: 9 INJECTION, SOLUTION INTRAVENOUS at 09:06

## 2024-06-13 RX ADMIN — BUMETANIDE 1 MG/HR: 0.25 INJECTION INTRAMUSCULAR; INTRAVENOUS at 03:06

## 2024-06-13 RX ADMIN — CHOLECALCIFEROL TAB 25 MCG (1000 UNIT) 1000 UNITS: 25 TAB at 08:06

## 2024-06-13 RX ADMIN — TRAMADOL HYDROCHLORIDE 50 MG: 50 TABLET, COATED ORAL at 10:06

## 2024-06-13 RX ADMIN — DEXTROSE MONOHYDRATE: 100 INJECTION, SOLUTION INTRAVENOUS at 06:06

## 2024-06-13 RX ADMIN — HYDRALAZINE HYDROCHLORIDE 100 MG: 50 TABLET ORAL at 03:06

## 2024-06-13 RX ADMIN — WARFARIN SODIUM 2.5 MG: 2.5 TABLET ORAL at 05:06

## 2024-06-13 RX ADMIN — MAGNESIUM SULFATE HEPTAHYDRATE 2 G: 40 INJECTION, SOLUTION INTRAVENOUS at 11:06

## 2024-06-13 RX ADMIN — AMIODARONE HYDROCHLORIDE 400 MG: 200 TABLET ORAL at 08:06

## 2024-06-13 RX ADMIN — PANTOPRAZOLE SODIUM 40 MG: 40 TABLET, DELAYED RELEASE ORAL at 08:06

## 2024-06-13 RX ADMIN — SODIUM CHLORIDE: 9 INJECTION, SOLUTION INTRAVENOUS at 04:06

## 2024-06-13 RX ADMIN — VENLAFAXINE 37.5 MG: 37.5 TABLET ORAL at 08:06

## 2024-06-13 RX ADMIN — AMOXICILLIN 500 MG: 500 CAPSULE ORAL at 09:06

## 2024-06-13 RX ADMIN — BUMETANIDE 1 MG/HR: 0.25 INJECTION INTRAMUSCULAR; INTRAVENOUS at 09:06

## 2024-06-13 RX ADMIN — HYDRALAZINE HYDROCHLORIDE 100 MG: 50 TABLET ORAL at 09:06

## 2024-06-13 RX ADMIN — ATORVASTATIN CALCIUM 40 MG: 40 TABLET, FILM COATED ORAL at 09:06

## 2024-06-13 RX ADMIN — FOLIC ACID 1 MG: 1 TABLET ORAL at 08:06

## 2024-06-13 NOTE — SUBJECTIVE & OBJECTIVE
"Interval HPI:   No acute events overnight. Patient in room OLDA7612/SGQT9112 A. Blood glucose improving. BG at and below goal on current insulin regimen (None). Steroid use- None .      Renal function- Abnormal - Cr 6.4   Vasopressors-  None     Diet Renal Coumadin Restriction; Fluid - 2000mL     Eatin%  Nausea: No  Hypoglycemia and intervention: No  Fever: No  TPN and/or TF: No    /80 (BP Location: Right arm, Patient Position: Lying)   Pulse 77   Temp 97.6 °F (36.4 °C) (Oral)   Resp 19   Ht 6' 1" (1.854 m)   Wt 80 kg (176 lb 5.9 oz)   SpO2 99%   BMI 23.27 kg/m²     Labs Reviewed and Include    Recent Labs   Lab 24   *  148*   CALCIUM 8.9  8.9   ALBUMIN 2.4*   *  127*   K 4.2  4.2   CO2 19*  19*   CL 95  95   BUN 75*  75*   CREATININE 6.4*  6.4*     Lab Results   Component Value Date    WBC 9.06 2024    HGB 6.5 (L) 2024    HCT 20.4 (L) 2024    MCV 90 2024     2024     No results for input(s): "TSH", "FREET4" in the last 168 hours.  Lab Results   Component Value Date    HGBA1C 7.4 (H) 2024       Nutritional status:   Body mass index is 23.27 kg/m².  Lab Results   Component Value Date    ALBUMIN 2.4 (L) 2024    ALBUMIN 2.5 (L) 2024    ALBUMIN 2.5 (L) 2024     Lab Results   Component Value Date    PREALBUMIN 25 2024    PREALBUMIN 23 2024    PREALBUMIN 25 2024       Estimated Creatinine Clearance: 13.5 mL/min (A) (based on SCr of 6.4 mg/dL (H)).    Accu-Checks  Recent Labs     24  1537 24  1645 24  1845 24  1946 24  2122 24  0104 24  0206 24  0349 24  0539 24  0736   POCTGLUCOSE 57* 178* 97 105 153* 71 135* 166* 157* 155*       Current Medications and/or Treatments Impacting Glycemic Control  Immunotherapy:    Immunosuppressants       None          Steroids:   Hormones (From admission, onward)      None          Pressors:  "   Autonomic Drugs (From admission, onward)      None          Hyperglycemia/Diabetes Medications:   Antihyperglycemics (From admission, onward)      None

## 2024-06-13 NOTE — PT/OT/SLP EVAL
Speech Language Pathology Evaluation  Bedside Swallow    Patient Name:  Radha Abbott   MRN:  [id number]  Admitting Diagnosis: Acute renal failure superimposed on stage 4 chronic kidney disease    Recommendations:                 General Recommendations:  Dysphagia therapy  Diet recommendations:  Regular Diet - IDDSI Level 7, Thin liquids - IDDSI Level 0   Aspiration Precautions: 1 bite/sip at a time and Standard aspiration precautions   General Precautions: Standard,    Communication strategies:  none    Assessment:     Radha Abbott is a 64 y.o. male with an SLP diagnosis of  history of dysphagia and dysphonia .  He presents with intact ability to safely manage thin liquids and regular solids. Speech service to continue follow to ensure tolerance.     History:     Past Medical History:   Diagnosis Date    CAD (coronary artery disease)     CHF (congestive heart failure)     Diabetes mellitus     HFrEF (heart failure with reduced ejection fraction)     ICD (implantable cardioverter-defibrillator) in place     MI, old     Type 2 diabetes mellitus without complication, without long-term current use of insulin 10/07/2023       Past Surgical History:   Procedure Laterality Date    ANGIOPLASTY-VENOUS ARTERY Right 12/1/2023    Procedure: ANGIOPLASTY-VENOUS ARTERY, RIGHT FEMORAL;  Surgeon: Yuri Washington MD;  Location: [facility] OR [address] [place] [address];  Service: Cardiovascular;  Laterality: Right;    AORTIC VALVULOPLASTY N/A 12/1/2023    Procedure: REPAIR, AORTIC VALVE;  Surgeon: Yuri Washington MD;  Location: [facility] OR [address] [place] [address];  Service: Cardiovascular;  Laterality: N/A;    CARDIAC SURGERY      CLOSURE N/A 12/1/2023    Procedure: CLOSURE, TEMPORARY;  Surgeon: Yuri Washington MD;  [facility];  Service: Cardiovascular;  Laterality: N/A;    DRAINAGE OF PLEURAL EFFUSION  12/4/2023    Procedure: DRAINAGE, PLEURAL EFFUSION;  Surgeon: Yuri Washington MD;  Location: [facility] OR [address] [place] [address];  Service: Cardiovascular;;    INSERTION OF GRAFT TO  PERICARDIUM  12/4/2023    Procedure: INSERTION, GRAFT, PERICARDIUM;  Surgeon: Yuri Washington MD;  [facility];  Service: Cardiovascular;;    INSERTION OF INTRA-AORTIC BALLOON ASSIST DEVICE Right 11/21/2023    Procedure: INSERTION, INTRA-AORTIC BALLOON PUMP;  Surgeon: Finn Cohn MD;  Location: [facility] CATH LAB;  Service: Cardiology;  Laterality: Right;    LEFT VENTRICULAR ASSIST DEVICE Left 12/1/2023    Procedure: INSERTION-LEFT VENTRICULAR ASSIST DEVICE;  Surgeon: Yuri Washington MD;  [facility];  Service: Cardiovascular;  Laterality: Left;  REDO STERNOTOMY - REDO SAW NEEDED FOR CASE    LYSIS OF ADHESIONS  12/1/2023    Procedure: LYSIS, ADHESIONS;  Surgeon: Yuri Washington MD;  [facility];  Service: Cardiovascular;;    PLACEMENT OF SWAN ROLANDO CATHETER WITH IMAGING GUIDANCE  11/20/2023    Procedure: INSERTION, CATHETER, SWAN-ROLANDO, WITH IMAGING GUIDANCE;  Surgeon: Sajan Hurley MD;  Location: [facility] CATH LAB;  Service: Cardiology;;    REMOVAL OF TUNNELED CENTRAL VENOUS CATHETER (CVC) N/A 3/1/2024    Procedure: REMOVAL, CATHETER, CENTRAL VENOUS, TUNNELED;  Surgeon: Seble Aguilar MD;  Location: [facility] CATH LAB;  Service: Interventional Nephrology;  Laterality: N/A;    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 12/1/2023    Procedure: REPAIR, ANEURYSM, ARTERY, FEMORAL;  Surgeon: Yuri Washington MD;  [facility];  Service: Cardiovascular;  Laterality: Right;  Right Femoral Artery Repair    RIGHT HEART CATHETERIZATION Right 10/10/2023    Procedure: INSERTION, CATHETER, RIGHT HEART;  Surgeon: Bin Gandhi MD;  Location: [facility] CATH LAB;  Service: Cardiology;  Laterality: Right;    RIGHT HEART CATHETERIZATION Right 10/13/2023    Procedure: INSERTION, CATHETER, RIGHT HEART;  Surgeon: Walter Mcintyre MD;  Location: [facility] CATH LAB;  Service: Cardiology;  Laterality: Right;    RIGHT HEART CATHETERIZATION  11/13/2023    RIGHT HEART CATHETERIZATION Right  11/13/2023    Procedure: INSERTION, CATHETER, RIGHT HEART;  Surgeon: Juventino Bermudez Jr., MD;  Location: [facility] CATH LAB;  Service: Cardiology;  Laterality: Right;    RIGHT HEART CATHETERIZATION Right 11/20/2023    Procedure: INSERTION, CATHETER, RIGHT HEART;  Surgeon: Sajan Hurley MD;  Location: [facility] CATH LAB;  Service: Cardiology;  Laterality: Right;    RIGHT HEART CATHETERIZATION Right 1/22/2024    Procedure: INSERTION, CATHETER, RIGHT HEART;  Surgeon: Brayan Ocampo MD;  Location: [facility] CATH LAB;  Service: Cardiology;  Laterality: Right;    STERNAL WOUND CLOSURE N/A 12/4/2023    Procedure: CLOSURE, WOUND, STERNUM;  Surgeon: Yuri Washington MD;  Location: [facility] OR 2ND FLR;  Service: Cardiovascular;  Laterality: N/A;    STERNOTOMY N/A 12/1/2023    Procedure: STERNOTOMY, REDO;  Surgeon: Yuri Washington MD;  Location: [facility] OR 2ND FLR;  Service: Cardiovascular;  Laterality: N/A;    VALVULOPLASTY, MITRAL VALVE N/A 12/1/2023    Procedure: VALVULOPLASTY, MITRAL VALVE;  Surgeon: Yuri Washington MD;  Location: [facility] OR 2ND FLR;  Service: Cardiovascular;  Laterality: N/A;   History of Present Illness:  Mr. Radha Abbott is a 63 yo male with stage D HFrEF, ICMP who underwent HM3 placement as DT (12/1/23) w/ a hospital stay complicated by vasoplegia(requiring Giaprezza), debility, malnutrition/impaired swallowing, and renal failure requiring HD (since weaned off) w/ recent discharge for ADHF presented to the ED with wife due to c/o worsening generalized weakness since 6/10. Per wife patient was mostly sleeping during the day despite sleeping 10 hrs at night. Patient also admits decreased appetite and decreased urine output despite taking his medications as prescribed. He has not urinated since 6/11 at 2pm. He has chronic shortness of breath on exertion. Denies confusion, syncope, diarrhea, constipation, N/V, dysuria, or other complaints. Patient had his prior tunnel cath removed used for HD about 2 weeks ago. He  completed a 6 week therapy for Enterococcus bacteremia with ampicillin and rocephin ending 5/30/24 and was switched to PO antibiotics.      Off note, patient was recently started by his PCP on glimepiride 1mg and unclear of BG trends during the day. Per patient last fasting  on 6/11.     On prior hospital stay nephrology consulted during hospital stay due to elevated renal function. They feel he is close to being a dialysis patient. He diuresed on IV Bumex with prn Metolazone. He is net negative 1.2L throughout his hospital stay and weight on day of discharge was 179lbs down from 186lbs on admit. We continued his home dose of Bumex 4mg BID but increased his prn Metolazone to 10mg. He received a dose of Epo while inpatient     Social History: Patient lives with spouse     Prior Intubation HX:  none this admission     Modified Barium Swallow:  Pt well known to speech service from prior admission.     MBSS Hx and impressions:      12/20:  Patient demonstrates severe Oropharyngeal  dysphagia characterized by delayed swallow initiation, reduced epiglottic inversion patterns, reduced hyloaryngeal elevation, resulting in silent aspiration of thin, nectar, honey and pureed solids  . Pt with penetration of pyriform and vellecula residue during cued second swallows to clear residue. Pt with moderate pyrifrom and vellecula residue with thin, nectar and honey thick liquids. Pt requiring cued second swallow to clear residue. Chin tuck was ineffective in preventing aspiration. Cued throat clear/cough was partially effective in ejecting aspirated from the airway. Pt remains at high risk for aspiration related complications given his overall medical status/weakness.           1/3: Patient demonstrates moderate  Oropharyngeal  dysphagia characterized by delayed swallow initiation, reduced epiglottic inversion, reduced BOT and reduced hylaryngeal excursion patterns resulting in observed tracheal silent aspiration of thin  liquids and penetration of nectar thick liquids.  Pt requiring cued cough to clear the aspirated/penetration material. Pt with noted vallecular and pyriform sinus residue with thin and nectar thick liquids, pt requiring cued second swallow to clear residue. There was no observed aspiration or penetration with honey thick liquids, purees and solids       1/23/24  Recommendations:                General Recommendations:  Dysphagia therapy, Follow up with ENT services  Diet recommendations:    Regular Diet - IDDSI Level 7, Moderately thick/Honey thick liquids - IDDSI Level 3   Pt may consume water in between meals only, while utilizing R head turn.   Follow up with ENT for further work up regarding recent LTVF augmentation  Consider repeat MBS following ENT work up    Aspiration Precautions: 1 bite/sip at a time, Alternating bites/sips, Head turned to the right, No straws, Small bites/sips, and Strict aspiration precautions, oral care.   General Precautions: Standard, LVAD, fall  Communication strategies:  none     Patient demonstrates moderate  pharyngeal dysphagia characterized by delayed swallow initiation, reduced bot retraction and reduced laryngeal vestibule closure resulting in silent aspiration of thin liquids during the swallow and deep penetration of nectar thick liquids after the swallow.  Vellecular and pyriform sinus residue noted with thin liquids and nectar thick liquids, with an increase with nectar thick liquids. R head turn was observed to reduce the amount of vellecular and pyriform sinus residue and reduce the risk for airway invasion; however given pt's inconsistent carryover of strategies across several weeks of structured therapy, consistent implementation of strategies unlikely to occur. Of note, pt with hx of L TVF augmentation during this hospitalization with ongoing concerns for VF dysfunction given pt's ongoing hoarse/low intensity voice. Given presence of aspiration during the swallow with  minimal pharyngeal residue, pt would benefit from ongoing ENT follow up at the post acute setting as poor airway protection remains pt's primary barrier to swallow safety.     Chest X-Rays: 6/12  FINDINGS:Cardiac size is enlarged.  AICD unit is present.  Vascular catheter seen with its tip in the superior vena cava and wire sutures are noted in the sternum.  LVAD device is seen at the base of the film.  There is some patchy airspace consolidation and edema seen in the lungs and I suspect there is some pleural effusion particularly on the left.     Prior diet: regular unrestricted diet at baseline spouse reports over the past few days with lethargy appetite has been limited       Subjective     Pt awake and alert  Spouse at the bedside   RN in agreement with SLP seeing     Pain/Comfort:  Pain Rating 1: 0/10  Pain Rating Post-Intervention 1: 0/10    Respiratory Status: Nasal cannula, flow 2 L/min    Objective:     Oral Musculature Evaluation  Oral Musculature: WFL  Dentition: present and adequate  Secretion Management: adequate  Oral Labial Strength and Mobility: WFL  Lingual Strength and Mobility: WFL  Volitional Cough: prompt  Volitional Swallow: prompt and stron  Voice Prior to PO Intake: strong and clear    Bedside Swallow Eval:   Consistencies Assessed:  Thin liquids single sips from open cup x5  Solids x3      Oral Phase:   WFL    Pharyngeal Phase:   no overt clinical signs/symptoms of aspiration  no overt clinical signs/symptoms of pharyngeal dysphagia    Compensatory Strategies  None    Treatment:  Education provided to Pt re: SLP role in acute care setting, overall impressions and therapeutic goals. Whiteboard updated.    Pt well known to speech service .Per spouse report appetite continues to be suppressed. Pt otherwise appearing safe to continue regular unrestricted diet. SLP discussed will plan to check pt once more prior to discharge from acute speech service given complex history of vocal fold paresis,  hoarseness and silent aspiration during prior admission at initial tome of VAD placement. All parties in agreement with plan.     Goals:   Multidisciplinary Problems       SLP Goals          Problem: SLP    Goal Priority Disciplines Outcome   SLP Goal     SLP    Description: Speech Language Pathology Goals  Goals expected to be met by    1. Pt will tolerate regular unrestricted diet without any overt clinical signs of aspiration                        Plan:     Patient to be seen:  2 x/week   Plan of Care expires:     Plan of Care reviewed with:  patient   SLP Follow-Up:  Yes       Discharge recommendations:      Barriers to Discharge:  None    Time Tracking:     SLP Treatment Date:   06/13/24  Speech Start Time:  1549  Speech Stop Time:  1605     Speech Total Time (min):  16 min    Billable Minutes: Eval Swallow and Oral Function 8 and Self Care/Home Management Training 8    06/13/2024

## 2024-06-13 NOTE — PLAN OF CARE
Problem: Adult Inpatient Plan of Care  Goal: Plan of Care Review  Outcome: Progressing  Goal: Patient-Specific Goal (Individualized)  Outcome: Progressing  Goal: Optimal Comfort and Wellbeing  Outcome: Progressing   Cardiac ICU Care Plan    POC reviewed with Radha Abbott and family. Questions and concerns addressed. See below and flowsheets for full assessment and VS info.   CVP 22 20 22  Svo2 62    A-line removed. Hampton removed d/t pt endorsing severe penile pain. Q6 Bladder scans per MD. CRRT 8 hours. goal 400. Patient at goal.     Neuro:  [place] Coma Scale  Best Eye Response: 3-->(E3) to speech  Best Motor Response: 6-->(M6) obeys commands  Best Verbal Response: 5-->(V5) oriented  Michelle Coma Scale Score: 14  Assessment Qualifiers: patient not sedated/intubated  Pupil PERRLA: yes    24 hr Temp:  [97.5 °F (36.4 °C)-97.9 °F (36.6 °C)]      CV:  Rhythm: normal sinus rhythm   DVT prophylaxis: VTE Required Core Measure: Pharmacological prophylaxis initiated/maintained    CVP (mean): (S) 20 mmHg (06/13/24 1501)       SVO2 (%): 46 % (06/12/24 2101)       Type: HeartMate3       Pulses  Right Radial Pulse: 1+ (weak)  Left Radial Pulse: 1+ (weak)  Right Dorsalis Pedis Pulse: 1+ (weak)  Left Dorsalis Pedis Pulse: 1+ (weak)  Right Posterior Tibial Pulse: 1+ (weak)  Left Posterior Tibial Pulse: 1+ (weak)    Resp:  Flow (L/min) (Oxygen Therapy): 2       GI/:  GI prophylaxis: no  Diet/Nutrition Received: other (see comments) (renal)  Last Bowel Movement: 06/12/24  Voiding Characteristics: urethral catheter (bladder)       Urethral Catheter 06/12/24 0300 16 Fr.-Reason for Continuing Urinary Catheterization: Critically ill in ICU and requiring hourly monitoring of intake/output   Intake/Output Summary (Last 24 hours) at 6/13/2024 1850  Last data filed at 6/13/2024 1801  Gross per 24 hour   Intake 2374.67 ml   Output 1311 ml   Net 1063.67 ml     Treatment Type: Slow low efficiency dialysis  UF Rate: 200 cc/hr  Nutritional  Supplement Intake: Quantity 0, Type: Novasource    Labs/Accuchecks:  Recent Labs   Lab 06/12/24  0345 06/12/24 2120 06/13/24 0349   WBC 6.63 10.80 9.06   RBC 2.26* 2.24* 2.27*   HGB 6.3* 6.5* 6.5*   HCT 20.5* 20.2* 20.4*    357 298      Recent Labs   Lab 06/11/24  1933 06/12/24  0345 06/13/24 0349   INR 2.4* 2.4* 2.8*   APTT 46.5* 45.2* 48.4*      Recent Labs     06/12/24  0345 06/12/24  1422 06/13/24  1540      < > 130*   K 3.9   < > 4.5   CO2 20*   < > 19*   CL 99   < > 97   *   < > 74*   CREATININE 9.5*   < > 7.0*   ALKPHOS 148*  --   --    ALT 67*  --   --    AST 73*  --   --    BILITOT 0.2  --   --     < > = values in this interval not displayed.       Recent Labs   Lab 06/11/24 1933   TROPONINI 0.051*      Recent Labs     06/12/24  1613 06/12/24  2151 06/13/24  0757   PH 7.357 7.338* 7.333*   PCO2 40.1 44.6 42.4   PO2 90 27* 34*   HCO3 22.5* 23.9* 22.5*   POCSATURATED 97 46 62   BE -3* -2 -3*       Electrolytes: No replacement orders  Accuchecks: ACHS    Gtts/LDAs:   sodium chloride 0.9%   Intravenous Continuous 200 mL/hr at 06/13/24 1801 Rate Verify at 06/13/24 1801    sodium chloride 0.9%   Intravenous Continuous   Stopped at 06/13/24 0117    bumetanide (BUMEX) 25 mg in 100 mL infusion (conc: 0.25 mg/mL)  1 mg/hr Intravenous Continuous 4 mL/hr at 06/13/24 1801 1 mg/hr at 06/13/24 1801    dextrose 10 % in water (D10W)   Intravenous Continuous 50 mL/hr at 06/13/24 1801 Rate Verify at 06/13/24 1801       Lines/Drains/Airways       Central Venous Catheter Line  Duration             Trialysis (Dialysis) Catheter 06/12/24 0124 right internal jugular 1 day              Drain  Duration                  Urethral Catheter 06/12/24 0300 16 Fr. 1 day              Line  Duration                  VAD 12/01/23 1015 Left ventricular assist device HeartMate 3 195 days              Peripheral Intravenous Line  Duration                  Midline Catheter - Single Lumen 06/13/24 1623 Left basilic vein  (medial side of arm) 20g x 10cm <1 day                    Skin/Wounds  Bathing/Skin Care: back care;bath, complete;dressed/undressed;incontinence care;linen changed (06/12/24 1901)  Wounds: No  Wound care consulted: No

## 2024-06-13 NOTE — PROGRESS NOTES
[facility] - Cardiac Intensive [facility]  Nephrology  Progress Note    Patient Name: Radha Abbott  MRN: [id number]  Admission Date: 6/11/2024  Hospital Length of Stay: 2 days  Attending Provider: Sajan Hurley, *   Primary Care Physician: Vasu Kong MD  Principal Problem:Acute renal failure superimposed on stage 4 chronic kidney disease    Subjective:     HPI: Mr. Abbott is a 64-year-old man with ischemic cardiomyopathy with most recent TTE showing EF 10 -15% and G3DD s/p [employer] ICM placement previously on chronic dobutamine infusion however now with Heartmate III LVAD placed on 12/01/2023, CAD s/p x3 vessel CABG back in 2009, type 2 diabetes mellitus (most recent hemoglobin A1c 7.4%), hypertension, HLD, history of ventricular fibrillation for which he is on amiodarone, chronic AC with Coumadin, advanced CKD V (baseline creatinine ~6) with recurrent AKIs and previous dialysis dependence (tunneled HD catheter removed in early March of this year). He had a recent hospitalization where he was treated for fluid overload acute on chronic CHF exacerbation and IVÁN thought to be 2/2 type 2 CRS and was subsequently diuresed and discharged with a creatinine of 6.2. He presents to the hospital on 6/11 for concerns of worsening fatigue that started one day prior to him arriving to the hospital, and has had difficulties getting out of bed. He has also noted a decrease in his urine output over the past 36 hours despite Diuretic use. He admits adherence to his Metolazone and Bumex at home, however has not passed any urine in the past 10 hours. Bed side bladder scan revealed 200 mL of urine in his bladder. Upon arrival to the ER, his is hemodynamically stable, Labs showed Hgb of 6.3, electrolytes within acceptable limits, Serum CO2 of 21, BUN of 118, and a creatinine of 9.8. He denies feeling fluid overloaded or short of breath, reports good appetite, denies nausea, vomiting, or metallic taste in his mouth. Chest xray  showed mild central vascular congestion and interstitial pulmonary opacities which appear improved compared to previous studies. CT abdomen/Pelvis without contrast revealed Kidneys that were reported as normal in size and location, and without hydronephrosis or ureteral dilation.     Interval History:   4 hour SLED completed overnight, adequate metabolic clearance.  Net positive 1.3L/24h.  He remains on bumex gtt, 550 ml of UOP/24h.  Electrolytes non-emergent.  Mental status significantly improved from yesterday.  HDS off pressors.    Review of patient's allergies indicates:  No Known Allergies  Current Facility-Administered Medications   Medication Frequency    0.9%  NaCl infusion (CRRT USE ONLY) Continuous    0.9%  NaCl infusion Continuous    acetaminophen tablet 650 mg Q6H PRN    amiodarone tablet 400 mg Daily    amoxicillin capsule 500 mg Q12H    atorvastatin tablet 40 mg QHS    bumetanide (BUMEX) 25 mg in 100 mL infusion (conc: 0.25 mg/mL) Continuous    dextrose 10 % infusion Continuous    dextrose 10% bolus 125 mL 125 mL PRN    dextrose 10% bolus 125 mL 125 mL PRN    dextrose 10% bolus 250 mL 250 mL PRN    dextrose 10% bolus 250 mL 250 mL PRN    folic acid tablet 1 mg Daily    glucagon (human recombinant) injection 1 mg PRN    glucagon (human recombinant) injection 1 mg PRN    glucose chewable tablet 16 g PRN    glucose chewable tablet 16 g PRN    glucose chewable tablet 24 g PRN    glucose chewable tablet 24 g PRN    hydrALAZINE tablet 100 mg Q8H    pantoprazole EC tablet 40 mg Daily    trazodone split tablet 25 mg Nightly PRN    venlafaxine tablet 37.5 mg Daily    vitamin D 1000 units tablet 1,000 Units Daily    warfarin (COUMADIN) tablet 2.5 mg Daily       Objective:     Vital Signs (Most Recent):  Temp: 97.5 °F (36.4 °C) (06/13/24 1101)  Pulse: 77 (06/13/24 1301)  Resp: 18 (06/13/24 1301)  BP: (!) 84/0 (06/13/24 1301)  SpO2: 99 % (06/13/24 1301) Vital Signs (24h Range):  Temp:  [97.5 °F (36.4 °C)-97.9 °F  (36.6 °C)] 97.5 °F (36.4 °C)  Pulse:  [] 77  Resp:  [15-22] 18  SpO2:  [92 %-100 %] 99 %  BP: ()/(0-81) 84/0  Arterial Line BP: ()/(71-98) 93/71     Weight: 80 kg (176 lb 5.9 oz) (06/13/24 0400)  Body mass index is 23.27 kg/m².  Body surface area is 2.03 meters squared.    I/O last 3 completed shifts:  In: 3175.1 [P.O.:476; I.V.:2699.1]  Out: 1913 [Urine:885; Other:1028]     Physical Exam  Vitals and nursing note reviewed.   Constitutional:       General: He is not in acute distress.     Appearance: Normal appearance. He is not ill-appearing or toxic-appearing.   HENT:      Head: Normocephalic and atraumatic.      Right Ear: External ear normal.      Left Ear: External ear normal.      Nose: Nose normal.      Mouth/Throat:      Mouth: Mucous membranes are moist.   Eyes:      Extraocular Movements: Extraocular movements intact.   Cardiovascular:      Rate and Rhythm: Normal rate and regular rhythm.      Pulses: Normal pulses.      Heart sounds: Normal heart sounds. No murmur heard.  Pulmonary:      Effort: Pulmonary effort is normal. No respiratory distress.      Breath sounds: Normal breath sounds. No wheezing or rales.   Chest:      Chest wall: No tenderness.   Abdominal:      General: Abdomen is flat. There is distension.      Palpations: Abdomen is soft.   Musculoskeletal:         General: No swelling or deformity. Normal range of motion.      Cervical back: Normal range of motion and neck supple. No rigidity or tenderness.      Right lower leg: No edema.      Left lower leg: No edema.   Skin:     General: Skin is warm and dry.      Capillary Refill: Capillary refill takes 2 to 3 seconds.   Neurological:      General: No focal deficit present.      Mental Status: He is alert and oriented to person, place, and time.   Psychiatric:         Mood and Affect: Mood normal.         Behavior: Behavior normal.         Thought Content: Thought content normal.         Judgment: Judgment normal.           Significant Labs:  CBC:   Recent Labs   Lab 06/13/24  0349   WBC 9.06   RBC 2.27*   HGB 6.5*   HCT 20.4*      MCV 90   MCH 28.6   MCHC 31.9*     CMP:   Recent Labs   Lab 06/12/24  0345 06/12/24  1422 06/13/24  0349   GLU 32*   < > 148*  148*   CALCIUM 8.9   < > 8.9  8.9   ALBUMIN 2.5*   < > 2.4*   PROT 7.5  --   --       < > 127*  127*   K 3.9   < > 4.2  4.2   CO2 20*   < > 19*  19*   CL 99   < > 95  95   *   < > 75*  75*   CREATININE 9.5*   < > 6.4*  6.4*   ALKPHOS 148*  --   --    ALT 67*  --   --    AST 73*  --   --    BILITOT 0.2  --   --     < > = values in this interval not displayed.        Assessment/Plan:     Cardiac/Vascular  LVAD (left ventricular assist device) present  -HTS following.    Renal/  * Acute renal failure superimposed on stage 4 chronic kidney disease  Patient with a baseline eGFR that appears to be variable however was noted to be in the 15-20 range in February before his recent IVÁN in May, presents now with fatigue and worsening IVÁN with a creatinine of 9.8 and eGFR of 5.5. BUN was noted to be elevated at 117, other electrolytes were normal. He was noted to have some asterixis on exam (which he states is chronic for him and unchanged from his baseline), however denies any other signs or symptoms of uremia. Bedside bladder scan revealed 200 mL of urine in his bladder and his last bladder void was 10 hours prior to this consult.     SLED initiated 6/13 x4 hours.  Mental status improved.    Recommendations  -Will plan to restart SLED today x 8 hours.    -net  cc/hr as tolerated, decrease UF rather than start pressors.  -electrolytes will be adjusted to current electrolytes  -Agree with current diuresis medications  -Avoid nephrotoxic agents (IV contrast, NSAID's, gadolinium contrast, PPI's) if able.   -Strict I's and O's  -RFP q 8 hours while on RRT.  PRN mg/phos replacement  -Renally dose all medications  -will need OP HD upon discharge, recommend CM/SW  involvement to start workup.  -will need IR evaluation once out of ICU for TDC placement.      Norm Vidal NP  Nephrology  [facility] - Cardiac [facility]

## 2024-06-13 NOTE — ASSESSMENT & PLAN NOTE
BG goal 140-180  T2DM LVAD. Admitted for worsening renal function with a BUN>100/Cr. 9.9. Hypoglycemia likely secondary to RIVAS (glimepiride) with worsened kidney function. Last took on 6/11. Since starting BG has trended down significantly from 300s to 70-130s at home per family. On D10 drip    Plan:  -Recommend continuing D10 infusion titrate to keep BG >80  -BG monitoring ac/hs/0200  -Hypoglycemia protocol in place    ** Please call Endocrine for any BG related issues **    Discharge plans:   BRANDO  -D/C Amaryl

## 2024-06-13 NOTE — PROGRESS NOTES
[facility] - Cardiac Intensive [facility]  Endocrinology  Progress Note    Admit Date: 6/11/2024     Reason for Consult: Management of T2DM, Hyperglycemia     Surgical Procedure and Date: HM3 placement as DT (12/1/23)     Diabetes diagnosis year: several years ago    Home Diabetes Medications:  Amaryl 1 mg (recently prescribed by PCP about a week ago)     Lab Results   Component Value Date    HGBA1C 7.4 (H) 04/17/2024       How often checking glucose at home? Once daily in the AM ( prior to starting Amaryl BG in the 300s, since starting Amaryl BG 70-130s)   BG readings on regimen: 70-130s  Hypoglycemia on the regimen?  No  Missed doses on regimen?  No    Diabetes Complications include:     Hypoglycemia , Diabetic nephropathy  , Diabetic chronic kidney disease     , and Diabetic peripheral neuropathy     Complicating diabetes co morbidities:   CHF and CKD      HPI:   Patient is a 64 y.o. male with a diagnosis of stage D HFrEF, ICMP who underwent HM3 placement as DT (12/1/23) w/ a hospital stay complicated by vasoplegia(requiring Giaprezza), debility, malnutrition/impaired swallowing, and renal failure requiring HD (since weaned off) w/ recent discharge for ADHF presented to the ED with wife due to c/o worsening generalized weakness since 6/10. Per wife patient was mostly sleeping during the day despite sleeping 10 hrs at night. Patient also admits decreased appetite and decreased urine output despite taking his medications as prescribed. He has chronic shortness of breath on exertion. Denies confusion, syncope, diarrhea, constipation, N/V, dysuria, or other complaints. Patient had his prior tunnel cath removed used for HD about 2 weeks ago. He completed a 6 week therapy for Enterococcus bacteremia with ampicillin and rocephin ending 5/30/24 and was switched to PO antibiotics. Off note, patient was recently started by his PCP on glimepiride 1mg and unclear of BG trends during the day. Per patient last fasting  on 6/11.  "Patient reports lasting taking the glimepiride on  AM.   Admitted for worsening renal function with a BUN>100/Cr. 9.9. Endocrinology consulted for management of T2DM.          Interval HPI:   No acute events overnight. Patient in room [id number] A. Blood glucose improving. BG at and below goal on current insulin regimen (None). Steroid use- None .      Renal function- Abnormal - Cr 6.4   Vasopressors-  None     Diet Renal Coumadin Restriction; Fluid - 2000mL     Eatin%  Nausea: No  Hypoglycemia and intervention: No  Fever: No  TPN and/or TF: No    /80 (BP Location: Right arm, Patient Position: Lying)   Pulse 77   Temp 97.6 °F (36.4 °C) (Oral)   Resp 19   Ht 6' 1" (1.854 m)   Wt 80 kg (176 lb 5.9 oz)   SpO2 99%   BMI 23.27 kg/m²     Labs Reviewed and Include    Recent Labs   Lab 24  0349   *  148*   CALCIUM 8.9  8.9   ALBUMIN 2.4*   *  127*   K 4.2  4.2   CO2 19*  19*   CL 95  95   BUN 75*  75*   CREATININE 6.4*  6.4*     Lab Results   Component Value Date    WBC 9.06 2024    HGB 6.5 (L) 2024    HCT 20.4 (L) 2024    MCV 90 2024     2024     No results for input(s): "TSH", "FREET4" in the last 168 hours.  Lab Results   Component Value Date    HGBA1C 7.4 (H) 2024       Nutritional status:   Body mass index is 23.27 kg/m².  Lab Results   Component Value Date    ALBUMIN 2.4 (L) 2024    ALBUMIN 2.5 (L) 2024    ALBUMIN 2.5 (L) 2024     Lab Results   Component Value Date    PREALBUMIN 25 2024    PREALBUMIN 23 2024    PREALBUMIN 25 2024       Estimated Creatinine Clearance: 13.5 mL/min (A) (based on SCr of 6.4 mg/dL (H)).    Accu-Checks  Recent Labs     24  1537 24  1645 24  1845 24  1946 24  2122 24  0104 24  0206 24  0349 24  0539 24  0736   POCTGLUCOSE 57* 178* 97 105 153* 71 135* 166* 157* 155*       Current Medications and/or " Treatments Impacting Glycemic Control  Immunotherapy:    Immunosuppressants       None          Steroids:   Hormones (From admission, onward)      None          Pressors:    Autonomic Drugs (From admission, onward)      None          Hyperglycemia/Diabetes Medications:   Antihyperglycemics (From admission, onward)      None            ASSESSMENT and PLAN    Cardiac/Vascular  LVAD (left ventricular assist device) present  Managed per primary team  Avoid hypoglycemia        Renal/  * Acute renal failure superimposed on stage 4 chronic kidney disease  Titrate insulin slowly to avoid hypoglycemia as the risk of hypoglycemia increases with decreased creatinine clearance.        Endocrine  Type 2 diabetes mellitus without complication, without long-term current use of insulin  BG goal 140-180  T2DM LVAD. Admitted for worsening renal function with a BUN>100/Cr. 9.9. Hypoglycemia likely secondary to RIVAS (glimepiride) with worsened kidney function. Last took on 6/11. Since starting BG has trended down significantly from 300s to 70-130s at home per family. On D10 drip    Plan:  -Recommend decreasing rate on D10 infusion to 25 mL/hr titrate to keep BG >80.  If BG remains reliably above 100, can further discontinue D10 infusion.  -BG monitoring /hs/0200  -Hypoglycemia protocol in place    ** Please call Endocrine for any BG related issues **    Discharge plans:   BRANDO  -D/C Camila Wilson PA-C  Endocrinology  [facility] - [facility]

## 2024-06-13 NOTE — ASSESSMENT & PLAN NOTE
Patient with a baseline eGFR that appears to be variable however was noted to be in the 15-20 range in February before his recent IVÁN in May, presents now with fatigue and worsening IVÁN with a creatinine of 9.8 and eGFR of 5.5. BUN was noted to be elevated at 117, other electrolytes were normal. He was noted to have some asterixis on exam (which he states is chronic for him and unchanged from his baseline), however denies any other signs or symptoms of uremia. Bedside bladder scan revealed 200 mL of urine in his bladder and his last bladder void was 10 hours prior to this consult.     SLED initiated 6/13 x4 hours.  Mental status improved.    Recommendations  -Will plan to restart SLED today x 8 hours.    -net  cc/hr as tolerated, decrease UF rather than start pressors.  -electrolytes will be adjusted to current electrolytes  -Agree with current diuresis medications  -Avoid nephrotoxic agents (IV contrast, NSAID's, gadolinium contrast, PPI's) if able.   -Strict I's and O's  -RFP q 8 hours while on RRT.  PRN mg/phos replacement  -Renally dose all medications  -will need OP HD upon discharge, recommend CM/SW involvement to start workup.  -will need IR evaluation once out of ICU for TDC placement.

## 2024-06-13 NOTE — PROGRESS NOTES
06/13/24 1646   Treatment   Treatment Type SLED   Treatment Status Restart   Dialysis Machine Number [id number]   Dialyzer Time (hours) 0   BVP (Liters) 0 L   Solutions Labeled and Current  Yes   Access Temporary Cath;Right;IJ   Catheter Dressing Intact  Yes   Alarms Engaged Yes   CRRT Comments sled restarted as ordered   Prescription   Time (Hours) 8   Dialysate K + (mEq/L) 4   Dialysate CA + (mEq/L) 2.25   Dialysate HCO3 - (Bicarb) (mEq/L) 25   Dialysate Na + (mEq/L) 135   Cartridge Type Other  (r300)   Dialysate Flow Rate (mL/min) 100   UF Goal Rate 400 mL/hr   CRRT Hourly Documentation   Blood Flow (mL/min) 150   UF Rate 200 cc/hr   Arterial Pressure (mmHg) -20 mmHg   Venous Pressure (mmHg) 50 mmHg   Effluent Pressure (EP) (mmHg) 20 mmHg   Total UF (Hourly Cleared) (mL) 0     SLED restarted as ordered. RIJ CVC aspirated, flushed, and accessed using aseptic technique. Lines connected and secured.

## 2024-06-13 NOTE — SUBJECTIVE & OBJECTIVE
Interval History: Had 4 hour run of SLED yesterday, CVP 20, UOP past 24 hours 550 cc. Much more alert and feels 100% better. Plan another run of SLED or SCUF today. No further hypoglycemia - weaning D50w gtt.     Continuous Infusions:   sodium chloride 0.9%   Intravenous Continuous   Stopped at 06/12/24 2012    sodium chloride 0.9%   Intravenous Continuous   Stopped at 06/13/24 0117    bumetanide (BUMEX) 25 mg in 100 mL infusion (conc: 0.25 mg/mL)  1 mg/hr Intravenous Continuous 4 mL/hr at 06/13/24 1001 1 mg/hr at 06/13/24 1001    dextrose 10 % in water (D10W)   Intravenous Continuous 50 mL/hr at 06/13/24 1001 Rate Verify at 06/13/24 1001     Scheduled Meds:   amiodarone  400 mg Oral Daily    amoxicillin  500 mg Oral Q12H    atorvastatin  40 mg Oral QHS    folic acid  1 mg Oral Daily    hydrALAZINE  100 mg Oral Q8H    pantoprazole  40 mg Oral Daily    venlafaxine  37.5 mg Oral Daily    vitamin D  1,000 Units Oral Daily    warfarin  2.5 mg Oral Daily     PRN Meds:  Current Facility-Administered Medications:     acetaminophen, 650 mg, Oral, Q6H PRN    dextrose 10%, 12.5 g, Intravenous, PRN    dextrose 10%, 12.5 g, Intravenous, PRN    dextrose 10%, 25 g, Intravenous, PRN    dextrose 10%, 25 g, Intravenous, PRN    glucagon (human recombinant), 1 mg, Intramuscular, PRN    glucagon (human recombinant), 1 mg, Intramuscular, PRN    glucose, 16 g, Oral, PRN    glucose, 16 g, Oral, PRN    glucose, 24 g, Oral, PRN    glucose, 24 g, Oral, PRN    magnesium sulfate IVPB, 2 g, Intravenous, PRN    traZODone, 25 mg, Oral, Nightly PRN    Review of patient's allergies indicates:  No Known Allergies  Objective:     Vital Signs (Most Recent):  Temp: 97.6 °F (36.4 °C) (06/13/24 0701)  Pulse: 75 (06/13/24 1001)  Resp: 17 (06/13/24 1001)  BP: 110/66 (06/13/24 1001)  SpO2: 100 % (06/13/24 1001) Vital Signs (24h Range):  Temp:  [97.5 °F (36.4 °C)-97.9 °F (36.6 °C)] 97.6 °F (36.4 °C)  Pulse:  [] 75  Resp:  [15-22] 17  SpO2:  [92 %-100  %] 100 %  BP: ()/(0-81) 110/66  Arterial Line BP: ()/(71-98) 93/71     Patient Vitals for the past 72 hrs (Last 3 readings):   Weight   06/13/24 0400 80 kg (176 lb 5.9 oz)   06/12/24 0401 80.5 kg (177 lb 7.5 oz)   06/11/24 1549 81.2 kg (179 lb)     Body mass index is 23.27 kg/m².      Intake/Output Summary (Last 24 hours) at 6/13/2024 1017  Last data filed at 6/13/2024 1001  Gross per 24 hour   Intake 2764.45 ml   Output 1478 ml   Net 1286.45 ml       Hemodynamic Parameters:       Telemetry: SR       Physical Exam  Constitutional:       Comments: Much more alert. Oriented X 3   HENT:      Head: Normocephalic and atraumatic.   Eyes:      Conjunctiva/sclera: Conjunctivae normal.      Pupils: Pupils are equal, round, and reactive to light.   Neck:      Comments: RIJ trialysis cath. JVP at earlobe  Cardiovascular:      Rate and Rhythm: Normal rate and regular rhythm.      Comments: Smooth VAD hum  Pulmonary:      Effort: Pulmonary effort is normal.      Breath sounds: Normal breath sounds.   Abdominal:      General: Bowel sounds are normal.      Palpations: Abdomen is soft.   Musculoskeletal:         General: No swelling. Normal range of motion.      Cervical back: Normal range of motion and neck supple.   Skin:     General: Skin is warm and dry.      Capillary Refill: Capillary refill takes 2 to 3 seconds.   Neurological:      Comments: Much more alert. Oriented X 3            Significant Labs:  CBC:  Recent Labs   Lab 06/12/24  0345 06/12/24  2120 06/13/24  0349   WBC 6.63 10.80 9.06   RBC 2.26* 2.24* 2.27*   HGB 6.3* 6.5* 6.5*   HCT 20.5* 20.2* 20.4*    357 298   MCV 91 90 90   MCH 27.9 29.0 28.6   MCHC 30.7* 32.2 31.9*     BNP:  Recent Labs   Lab 06/11/24  1933   BNP 1,388*     CMP:  Recent Labs   Lab 06/11/24  1605 06/11/24  2227 06/12/24  0345 06/12/24  1422 06/12/24  1642 06/12/24  2120 06/13/24  0349   GLU 84   < > 32* 81 174* 151* 148*  148*   CALCIUM 8.7   < > 8.9 8.9 8.6* 8.7 8.9  8.9    ALBUMIN 2.6*  --  2.5* 2.5*  --  2.5* 2.4*   PROT 7.6  --  7.5  --   --   --   --       < > 136 134* 130* 129* 127*  127*   K 4.1   < > 3.9 4.1 4.0 4.2 4.2  4.2   CO2 20*   < > 20* 22* 20* 17* 19*  19*   CL 99   < > 99 96 97 99 95  95   *   < > 114* 112* 98* 69* 75*  75*   CREATININE 9.9*   < > 9.5* 9.4* 8.3* 6.1* 6.4*  6.4*   ALKPHOS 142*  --  148*  --   --   --   --    ALT 59*  --  67*  --   --   --   --    AST 58*  --  73*  --   --   --   --    BILITOT 0.2  --  0.2  --   --   --   --     < > = values in this interval not displayed.      Coagulation:   Recent Labs   Lab 06/11/24  1933 06/12/24 0345 06/13/24 0349   INR 2.4* 2.4* 2.8*   APTT 46.5* 45.2* 48.4*     LDH:  Recent Labs   Lab 06/12/24 0345 06/13/24  0349   * 422*     Microbiology:  Microbiology Results (last 7 days)       Procedure Component Value Units Date/Time    Blood culture #2 **CANNOT BE ORDERED STAT** [[id number]] Collected: 06/11/24 2121    Order Status: Completed Specimen: Blood from Peripheral, Hand, Left Updated: 06/12/24 2213     Blood Culture, Routine No Growth to date      No Growth to date    Blood culture #1 **CANNOT BE ORDERED STAT** [[id number]] Collected: 06/11/24 2122    Order Status: Completed Specimen: Blood from Peripheral, Antecubital, Right Updated: 06/12/24 2213     Blood Culture, Routine No Growth to date      No Growth to date    Urine culture [[id number]] Collected: 06/12/24 1422    Order Status: No result Specimen: Urine Updated: 06/12/24 5531            I have reviewed all pertinent labs within the past 24 hours.    Estimated Creatinine Clearance: 13.5 mL/min (A) (based on SCr of 6.4 mg/dL (H)).    Diagnostic Results:  I have reviewed and interpreted all pertinent imaging results/findings within the past 24 hours.

## 2024-06-13 NOTE — PROCEDURES
RAPID RESPONSE VASCULAR ACCESS NOTE       Bed:[id number] A    Single lumen 20G, 10CM midline placed in the left basilic vein. Needle advanced into the vessel under real time ultrasound guidance.    Attempts: 1   Max dwell date: 7/12/24  Lot number: [id number]      Deyanira Muse RN

## 2024-06-13 NOTE — PLAN OF CARE
Cardiac ICU Care Plan    POC reviewed with Radha Abbott and family. Questions and concerns addressed.  See below and flowsheets for full assessment and VS info.     CVP 18, 16, 17, 17   SVO2 46  Dopplers 86, 84, 84  CRRT run over 4h, 1L removed   Blood glucose stable on D10 gtts   D10 and Bumex continue to infuse  No VAD alarms       Neuro:  [place] Coma Scale  Best Eye Response: 3-->(E3) to speech  Best Motor Response: 6-->(M6) obeys commands  Best Verbal Response: 5-->(V5) oriented  [place] Coma Scale Score: 14  Assessment Qualifiers: patient not sedated/intubated  Pupil PERRLA: yes    24 hr Temp:  [97.5 °F (36.4 °C)-97.9 °F (36.6 °C)]      CV:  Rhythm: normal sinus rhythm   DVT prophylaxis: VTE Required Core Measure: Pharmacological prophylaxis initiated/maintained    CVP (mean): 214 mmHg (06/13/24 0401)       SVO2 (%): 46 % (06/12/24 2101)       Type: HeartMate3       Pulses  Right Radial Pulse: 1+ (weak)  Left Radial Pulse: 1+ (weak)  Right Dorsalis Pedis Pulse: 1+ (weak)  Left Dorsalis Pedis Pulse: 1+ (weak)  Right Posterior Tibial Pulse: 1+ (weak)  Left Posterior Tibial Pulse: 1+ (weak)    Resp:  Flow (L/min) (Oxygen Therapy): 2       GI/:  GI prophylaxis: yes  Diet/Nutrition Received: other (see comments), no added salt, restrict fluids (Renal)  Last Bowel Movement: 06/12/24  Voiding Characteristics: urethral catheter (bladder), patient on CRRT       Urethral Catheter 06/12/24 0300 16 Fr.-Reason for Continuing Urinary Catheterization: Critically ill in ICU and requiring hourly monitoring of intake/output   Intake/Output Summary (Last 24 hours) at 6/13/2024 0410  Last data filed at 6/13/2024 0401  Gross per 24 hour   Intake 2997.7 ml   Output 1633 ml   Net 1364.7 ml     Treatment Type: Slow low efficiency dialysis  UF Rate: 450 cc/hr  Nutritional Supplement Intake: Quantity 0, Type:  Boost    Labs/Accuchecks:  Recent Labs   Lab 06/12/24  0345 06/12/24 2120 06/13/24  0349   WBC 6.63 10.80 9.06   RBC 2.26*  2.24* 2.27*   HGB 6.3* 6.5* 6.5*   HCT 20.5* 20.2* 20.4*    357 298      Recent Labs   Lab 06/07/24  0000 06/11/24  1933 06/12/24  0345   INR 2.6 2.4* 2.4*   APTT  --  46.5* 45.2*      Recent Labs     06/12/24  0345 06/12/24  1422 06/12/24  2120      < > 129*   K 3.9   < > 4.2   CO2 20*   < > 17*   CL 99   < > 99   *   < > 69*   CREATININE 9.5*   < > 6.1*   ALKPHOS 148*  --   --    ALT 67*  --   --    AST 73*  --   --    BILITOT 0.2  --   --     < > = values in this interval not displayed.       Recent Labs   Lab 06/11/24 1933   TROPONINI 0.051*      Recent Labs     06/12/24  0756 06/12/24  1613 06/12/24  2151   PH 7.341* 7.357 7.338*   PCO2 44.2 40.1 44.6   PO2 31* 90 27*   HCO3 23.9* 22.5* 23.9*   POCSATURATED 55 97 46   BE -2 -3* -2       Electrolytes: No replacement orders  Accuchecks: Q4H    Gtts/LDAs:   sodium chloride 0.9%   Intravenous Continuous   Stopped at 06/12/24 2012    sodium chloride 0.9%   Intravenous Continuous   Stopped at 06/13/24 0117    bumetanide (BUMEX) 25 mg in 100 mL infusion (conc: 0.25 mg/mL)  1 mg/hr Intravenous Continuous 4 mL/hr at 06/13/24 0401 1 mg/hr at 06/13/24 0401    dextrose 10 % in water (D10W)   Intravenous Continuous 85 mL/hr at 06/13/24 0401 Rate Verify at 06/13/24 0401       Lines/Drains/Airways       Central Venous Catheter Line  Duration             Trialysis (Dialysis) Catheter 06/12/24 0124 right internal jugular 1 day              Drain  Duration                  Urethral Catheter 06/12/24 0300 16 Fr. 1 day              Arterial Line  Duration             Arterial Line 06/12/24 0155 Right Radial 1 day              Line  Duration                  VAD 12/01/23 1015 Left ventricular assist device HeartMate 3 194 days              Peripheral Intravenous Line  Duration                  Peripheral IV - Single Lumen 06/11/24 1605 20 G Left;Posterior Hand 1 day                    Skin/Wounds  Bathing/Skin Care: back care;bath,  complete;dressed/undressed;incontinence care;linen changed (06/12/24 9994)  Wounds: No  Wound care consulted: No     Problem: Adult Inpatient Plan of Care  Goal: Plan of Care Review  Outcome: Progressing

## 2024-06-13 NOTE — SUBJECTIVE & OBJECTIVE
Interval History:   4 hour SLED completed overnight, adequate metabolic clearance.  Net positive 1.3L/24h.  He remains on bumex gtt, 550 ml of UOP/24h.  Electrolytes non-emergent.  Mental status significantly improved from yesterday.  HDS off pressors.    Review of patient's allergies indicates:  No Known Allergies  Current Facility-Administered Medications   Medication Frequency    0.9%  NaCl infusion (CRRT USE ONLY) Continuous    0.9%  NaCl infusion Continuous    acetaminophen tablet 650 mg Q6H PRN    amiodarone tablet 400 mg Daily    amoxicillin capsule 500 mg Q12H    atorvastatin tablet 40 mg QHS    bumetanide (BUMEX) 25 mg in 100 mL infusion (conc: 0.25 mg/mL) Continuous    dextrose 10 % infusion Continuous    dextrose 10% bolus 125 mL 125 mL PRN    dextrose 10% bolus 125 mL 125 mL PRN    dextrose 10% bolus 250 mL 250 mL PRN    dextrose 10% bolus 250 mL 250 mL PRN    folic acid tablet 1 mg Daily    glucagon (human recombinant) injection 1 mg PRN    glucagon (human recombinant) injection 1 mg PRN    glucose chewable tablet 16 g PRN    glucose chewable tablet 16 g PRN    glucose chewable tablet 24 g PRN    glucose chewable tablet 24 g PRN    hydrALAZINE tablet 100 mg Q8H    pantoprazole EC tablet 40 mg Daily    trazodone split tablet 25 mg Nightly PRN    venlafaxine tablet 37.5 mg Daily    vitamin D 1000 units tablet 1,000 Units Daily    warfarin (COUMADIN) tablet 2.5 mg Daily       Objective:     Vital Signs (Most Recent):  Temp: 97.5 °F (36.4 °C) (06/13/24 1101)  Pulse: 77 (06/13/24 1301)  Resp: 18 (06/13/24 1301)  BP: (!) 84/0 (06/13/24 1301)  SpO2: 99 % (06/13/24 1301) Vital Signs (24h Range):  Temp:  [97.5 °F (36.4 °C)-97.9 °F (36.6 °C)] 97.5 °F (36.4 °C)  Pulse:  [] 77  Resp:  [15-22] 18  SpO2:  [92 %-100 %] 99 %  BP: ()/(0-81) 84/0  Arterial Line BP: ()/(71-98) 93/71     Weight: 80 kg (176 lb 5.9 oz) (06/13/24 0400)  Body mass index is 23.27 kg/m².  Body surface area is 2.03 meters  squared.    I/O last 3 completed shifts:  In: 3175.1 [P.O.:476; I.V.:2699.1]  Out: 1913 [Urine:885; Other:1028]     Physical Exam  Vitals and nursing note reviewed.   Constitutional:       General: He is not in acute distress.     Appearance: Normal appearance. He is not ill-appearing or toxic-appearing.   HENT:      Head: Normocephalic and atraumatic.      Right Ear: External ear normal.      Left Ear: External ear normal.      Nose: Nose normal.      Mouth/Throat:      Mouth: Mucous membranes are moist.   Eyes:      Extraocular Movements: Extraocular movements intact.   Cardiovascular:      Rate and Rhythm: Normal rate and regular rhythm.      Pulses: Normal pulses.      Heart sounds: Normal heart sounds. No murmur heard.  Pulmonary:      Effort: Pulmonary effort is normal. No respiratory distress.      Breath sounds: Normal breath sounds. No wheezing or rales.   Chest:      Chest wall: No tenderness.   Abdominal:      General: Abdomen is flat. There is distension.      Palpations: Abdomen is soft.   Musculoskeletal:         General: No swelling or deformity. Normal range of motion.      Cervical back: Normal range of motion and neck supple. No rigidity or tenderness.      Right lower leg: No edema.      Left lower leg: No edema.   Skin:     General: Skin is warm and dry.      Capillary Refill: Capillary refill takes 2 to 3 seconds.   Neurological:      General: No focal deficit present.      Mental Status: He is alert and oriented to person, place, and time.   Psychiatric:         Mood and Affect: Mood normal.         Behavior: Behavior normal.         Thought Content: Thought content normal.         Judgment: Judgment normal.          Significant Labs:  CBC:   Recent Labs   Lab 06/13/24  0349   WBC 9.06   RBC 2.27*   HGB 6.5*   HCT 20.4*      MCV 90   MCH 28.6   MCHC 31.9*     CMP:   Recent Labs   Lab 06/12/24  0345 06/12/24  1422 06/13/24  0349   GLU 32*   < > 148*  148*   CALCIUM 8.9   < > 8.9  8.9    ALBUMIN 2.5*   < > 2.4*   PROT 7.5  --   --       < > 127*  127*   K 3.9   < > 4.2  4.2   CO2 20*   < > 19*  19*   CL 99   < > 95  95   *   < > 75*  75*   CREATININE 9.5*   < > 6.4*  6.4*   ALKPHOS 148*  --   --    ALT 67*  --   --    AST 73*  --   --    BILITOT 0.2  --   --     < > = values in this interval not displayed.

## 2024-06-13 NOTE — PROGRESS NOTES
06/13/2024  Deni Frye    Current provider:  Sajan Hurley, *    Device interrogation:      6/13/2024    11:01 AM 6/13/2024    10:01 AM 6/13/2024     9:01 AM 6/13/2024     8:01 AM 6/13/2024     7:01 AM 6/13/2024     6:01 AM 6/13/2024     5:01 AM   TXP LVAD INTERROGATIONS   Type HeartMate3 HeartMate3 HeartMate3 HeartMate3 HeartMate3 HeartMate3 HeartMate3   Flow  4 4 4 3.9 4 3.9   Speed  5000 5000 5000 5000 500 5000   PI  4.2 4.9 4.8 5.2 5.6 5.8   Power ([place])  3.3 3.3 3.4 3.3 3.4 3.4   LSL  4600 4600 4600 4600 4600 4600   Pulsatility Intermittent pulse Intermittent pulse Intermittent pulse Intermittent pulse Intermittent pulse Intermittent pulse Intermittent pulse          Rounded on [facility] to ensure all mechanical assist device settings (IABP or VAD) were appropriate and all parameters were within limits.  I was able to ensure all back up equipment was present, the staff had no issues, and the Perfusion Department daily rounding was complete.      For implantable VADs: Interrogation of Ventricular assist device was performed with analysis of device parameters and review of device function. I have personally reviewed the interrogation findings and agree with findings as stated.     In emergency, the nursing units have been notified to contact the perfusion department either by:  Calling [phone] from 630am to 4pm Mon thru Fri, utilizing the On-Call Finder functionality of Epic and searching for Perfusion, or by contacting the hospital  from 4pm to 630am and on weekends and asking to speak with the perfusionist on call.    2:54 PM

## 2024-06-13 NOTE — PLAN OF CARE
Problem: Adult Inpatient Plan of Care  Goal: Plan of Care Review  Outcome: Progressing  Goal: Patient-Specific Goal (Individualized)  Outcome: Progressing  Goal: Optimal Comfort and Wellbeing  Outcome: Progressing   Cardiac ICU Care Plan    POC reviewed with Radha Abbott and family. Questions and concerns addressed. See below and flowsheets for full assessment and VS info.     Cvp 23 27 24  Svo2 55    Bumex ivp 4mg given. Bumex gtt started then d/c'd. CRRT started. SLED 4 hours only. PT at UF rate goal. Total urine output for shift approx 400cc. 2L NC on pt d/t SOB.     Neuro:  [place] Coma Scale  Best Eye Response: 3-->(E3) to speech  Best Motor Response: 6-->(M6) obeys commands  Best Verbal Response: 5-->(V5) oriented  Michelle Coma Scale Score: 14  Assessment Qualifiers: patient not sedated/intubated  Pupil PERRLA: yes    24 hr Temp:  [97.5 °F (36.4 °C)-97.8 °F (36.6 °C)]      CV:  Rhythm: normal sinus rhythm   DVT prophylaxis: VTE Required Core Measure: Pharmacological prophylaxis initiated/maintained    CVP (mean): (S) 24 mmHg (06/12/24 1501)               Type: HeartMate3       Pulses  Right Radial Pulse: 1+ (weak)  Left Radial Pulse: 1+ (weak)  Right Dorsalis Pedis Pulse: 1+ (weak)  Left Dorsalis Pedis Pulse: 1+ (weak)  Right Posterior Tibial Pulse: 1+ (weak)  Left Posterior Tibial Pulse: 1+ (weak)    Resp:  Flow (L/min) (Oxygen Therapy): 2       GI/:  GI prophylaxis: yes  Diet/Nutrition Received: other (see comments), no added salt, restrict fluids (RENAL DIET)  Last Bowel Movement: 06/12/24  Voiding Characteristics: urethral catheter (bladder)       Urethral Catheter 06/12/24 0300 16 Fr.-Reason for Continuing Urinary Catheterization: Critically ill in ICU and requiring hourly monitoring of intake/output   Intake/Output Summary (Last 24 hours) at 6/12/2024 1943  Last data filed at 6/12/2024 1801  Gross per 24 hour   Intake 1684.8 ml   Output 735 ml   Net 949.8 ml     Treatment Type: Slow low efficiency  dialysis  UF Rate: 300 cc/hr  Nutritional Supplement Intake: Quantity 0, Type: Boost    Labs/Accuchecks:  Recent Labs   Lab 06/11/24  1605 06/12/24  0345   WBC 7.04 6.63   RBC 2.20* 2.26*   HGB 6.3* 6.3*   HCT 19.8* 20.5*    319      Recent Labs   Lab 06/07/24  0000 06/11/24  1933 06/12/24  0345   INR 2.6 2.4* 2.4*   APTT  --  46.5* 45.2*      Recent Labs     06/12/24  0345 06/12/24  1422 06/12/24  1642      < > 130*   K 3.9   < > 4.0   CO2 20*   < > 20*   CL 99   < > 97   *   < > 98*   CREATININE 9.5*   < > 8.3*   ALKPHOS 148*  --   --    ALT 67*  --   --    AST 73*  --   --    BILITOT 0.2  --   --     < > = values in this interval not displayed.       Recent Labs   Lab 06/11/24  1933   TROPONINI 0.051*      Recent Labs     06/12/24  0332 06/12/24  0756 06/12/24  1613   PH 7.406 7.341* 7.357   PCO2 38.4 44.2 40.1   PO2 79* 31* 90   HCO3 24.1 23.9* 22.5*   POCSATURATED 96 55 97   BE -1 -2 -3*       Electrolytes: N/A - electrolytes WDL  Accuchecks: Q1H    Gtts/LDAs:   sodium chloride 0.9%   Intravenous Continuous 200 mL/hr at 06/12/24 1801 Rate Verify at 06/12/24 1801    dextrose 10 % in water (D10W)   Intravenous Continuous 85 mL/hr at 06/12/24 1801 Rate Verify at 06/12/24 1801       Lines/Drains/Airways       Central Venous Catheter Line  Duration             Trialysis (Dialysis) Catheter 06/12/24 0124 right internal jugular <1 day              Drain  Duration                  Urethral Catheter 06/12/24 0300 16 Fr. <1 day              Arterial Line  Duration             Arterial Line 06/12/24 0155 Right Radial <1 day              Line  Duration                  VAD 12/01/23 1015 Left ventricular assist device HeartMate 3 194 days              Peripheral Intravenous Line  Duration                  Peripheral IV - Single Lumen 06/11/24 1605 20 G Left;Posterior Hand 1 day         Peripheral IV - Single Lumen 06/11/24 2226 22 G Posterior;Right Hand <1 day                    Skin/Wounds  Bathing/Skin  Care: bath, partial;dressed/undressed (06/12/24 0015)  Wounds: No  Wound care consulted: No

## 2024-06-13 NOTE — PROGRESS NOTES
06/12/24 2020   Treatment   Treatment Type SLED   Treatment Status Discontinued treatment   Dialysis Machine Number [id number]   Dialyzer Time (hours) 4   BVP (Liters) 35.2 L   Solutions Labeled and Current  Yes   Access Temporary Cath   Catheter Dressing Intact  Yes   Alarms Engaged Yes   CRRT Comments crrt completed   CRRT Hourly Documentation   Blood Flow (mL/min) 150   UF Rate 450 cc/hr   Arterial Pressure (mmHg) 20 mmHg   Venous Pressure (mmHg) 50 mmHg   Effluent Pressure (EP) (mmHg) 20 mmHg   Total UF (Hourly Cleared) (mL) 542     Received report from primary rn, crrt completed, deaccessed, vss

## 2024-06-14 ENCOUNTER — DOCUMENT SCAN (OUTPATIENT)
Dept: HOME HEALTH SERVICES | Facility: HOSPITAL | Age: 64
End: 2024-06-14
Payer: MEDICAID

## 2024-06-14 LAB
ALBUMIN SERPL BCP-MCNC: 2.4 G/DL (ref 3.5–5.2)
ALBUMIN SERPL BCP-MCNC: 2.5 G/DL (ref 3.5–5.2)
ALLENS TEST: ABNORMAL
ALP SERPL-CCNC: 165 U/L (ref 55–135)
ALT SERPL W/O P-5'-P-CCNC: 65 U/L (ref 10–44)
ANION GAP SERPL CALC-SCNC: 12 MMOL/L (ref 8–16)
APTT PPP: 53.5 SEC (ref 21–32)
AST SERPL-CCNC: 64 U/L (ref 10–40)
BASOPHILS # BLD AUTO: 0.02 K/UL (ref 0–0.2)
BASOPHILS NFR BLD: 0.2 % (ref 0–1.9)
BILIRUB DIRECT SERPL-MCNC: 0.3 MG/DL (ref 0.1–0.3)
BILIRUB SERPL-MCNC: 0.4 MG/DL (ref 0.1–1)
BNP SERPL-MCNC: 1542 PG/ML (ref 0–99)
BUN SERPL-MCNC: 18 MG/DL (ref 8–23)
BUN SERPL-MCNC: 30 MG/DL (ref 8–23)
BUN SERPL-MCNC: 41 MG/DL (ref 8–23)
CALCIUM SERPL-MCNC: 8.8 MG/DL (ref 8.7–10.5)
CALCIUM SERPL-MCNC: 8.9 MG/DL (ref 8.7–10.5)
CALCIUM SERPL-MCNC: 9 MG/DL (ref 8.7–10.5)
CHLORIDE SERPL-SCNC: 100 MMOL/L (ref 95–110)
CHLORIDE SERPL-SCNC: 99 MMOL/L (ref 95–110)
CO2 SERPL-SCNC: 17 MMOL/L (ref 23–29)
CO2 SERPL-SCNC: 18 MMOL/L (ref 23–29)
CREAT SERPL-MCNC: 1.8 MG/DL (ref 0.5–1.4)
CREAT SERPL-MCNC: 3.3 MG/DL (ref 0.5–1.4)
CREAT SERPL-MCNC: 4 MG/DL (ref 0.5–1.4)
CRP SERPL-MCNC: 212.3 MG/L (ref 0–8.2)
DELSYS: ABNORMAL
DIFFERENTIAL METHOD BLD: ABNORMAL
EOSINOPHIL # BLD AUTO: 0 K/UL (ref 0–0.5)
EOSINOPHIL NFR BLD: 0.3 % (ref 0–8)
ERYTHROCYTE [DISTWIDTH] IN BLOOD BY AUTOMATED COUNT: 17.2 % (ref 11.5–14.5)
EST. GFR  (NO RACE VARIABLE): 16.1 ML/MIN/1.73 M^2
EST. GFR  (NO RACE VARIABLE): 20.3 ML/MIN/1.73 M^2
EST. GFR  (NO RACE VARIABLE): 42 ML/MIN/1.73 M^2
FLOW: 2
FLOW: 3
GLUCOSE SERPL-MCNC: 117 MG/DL (ref 70–110)
GLUCOSE SERPL-MCNC: 129 MG/DL (ref 70–110)
GLUCOSE SERPL-MCNC: 81 MG/DL (ref 70–110)
HAPTOGLOB SERPL-MCNC: 329 MG/DL (ref 30–250)
HCO3 UR-SCNC: 19.5 MMOL/L (ref 24–28)
HCO3 UR-SCNC: 19.6 MMOL/L (ref 24–28)
HCO3 UR-SCNC: 19.6 MMOL/L (ref 24–28)
HCO3 UR-SCNC: 22 MMOL/L (ref 24–28)
HCT VFR BLD AUTO: 20.3 % (ref 40–54)
HCT VFR BLD CALC: 21 %PCV (ref 36–54)
HGB BLD-MCNC: 6.2 G/DL (ref 14–18)
IMM GRANULOCYTES # BLD AUTO: 0.06 K/UL (ref 0–0.04)
IMM GRANULOCYTES NFR BLD AUTO: 0.6 % (ref 0–0.5)
INR PPP: 3.2 (ref 0.8–1.2)
LDH SERPL L TO P-CCNC: 506 U/L (ref 110–260)
LDH SERPL L TO P-CCNC: 734 U/L (ref 110–260)
LYMPHOCYTES # BLD AUTO: 0.2 K/UL (ref 1–4.8)
LYMPHOCYTES NFR BLD: 2.3 % (ref 18–48)
MAGNESIUM SERPL-MCNC: 2 MG/DL (ref 1.6–2.6)
MAGNESIUM SERPL-MCNC: 2.4 MG/DL (ref 1.6–2.6)
MCH RBC QN AUTO: 27.9 PG (ref 27–31)
MCHC RBC AUTO-ENTMCNC: 30.5 G/DL (ref 32–36)
MCV RBC AUTO: 91 FL (ref 82–98)
MODE: ABNORMAL
MONOCYTES # BLD AUTO: 0.5 K/UL (ref 0.3–1)
MONOCYTES NFR BLD: 5.2 % (ref 4–15)
NEUTROPHILS # BLD AUTO: 9 K/UL (ref 1.8–7.7)
NEUTROPHILS NFR BLD: 91.4 % (ref 38–73)
NRBC BLD-RTO: 0 /100 WBC
OHS QRS DURATION: 134 MS
OHS QTC CALCULATION: 501 MS
PCO2 BLDA: 30.1 MMHG (ref 35–45)
PCO2 BLDA: 35.8 MMHG (ref 35–45)
PCO2 BLDA: 37.1 MMHG (ref 35–45)
PCO2 BLDA: 37.5 MMHG (ref 35–45)
PH SMN: 7.32 [PH] (ref 7.35–7.45)
PH SMN: 7.34 [PH] (ref 7.35–7.45)
PH SMN: 7.38 [PH] (ref 7.35–7.45)
PH SMN: 7.42 [PH] (ref 7.35–7.45)
PHOSPHATE SERPL-MCNC: 2 MG/DL (ref 2.7–4.5)
PHOSPHATE SERPL-MCNC: 3.2 MG/DL (ref 2.7–4.5)
PHOSPHATE SERPL-MCNC: 5 MG/DL (ref 2.7–4.5)
PLATELET # BLD AUTO: 312 K/UL (ref 150–450)
PMV BLD AUTO: 9.3 FL (ref 9.2–12.9)
PO2 BLDA: 23 MMHG (ref 40–60)
PO2 BLDA: 27 MMHG (ref 40–60)
PO2 BLDA: 29 MMHG (ref 40–60)
PO2 BLDA: 84 MMHG (ref 80–100)
POC BE: -3 MMOL/L
POC BE: -5 MMOL/L
POC BE: -6 MMOL/L
POC BE: -6 MMOL/L
POC IONIZED CALCIUM: 1.16 MMOL/L (ref 1.06–1.42)
POC SATURATED O2: 37 % (ref 95–100)
POC SATURATED O2: 49 % (ref 95–100)
POC SATURATED O2: 51 % (ref 95–100)
POC SATURATED O2: 97 % (ref 95–100)
POC TCO2: 21 MMOL/L (ref 23–27)
POC TCO2: 21 MMOL/L (ref 24–29)
POC TCO2: 21 MMOL/L (ref 24–29)
POC TCO2: 23 MMOL/L (ref 24–29)
POCT GLUCOSE: 126 MG/DL (ref 70–110)
POCT GLUCOSE: 129 MG/DL (ref 70–110)
POCT GLUCOSE: 182 MG/DL (ref 70–110)
POCT GLUCOSE: 193 MG/DL (ref 70–110)
POCT GLUCOSE: 95 MG/DL (ref 70–110)
POTASSIUM BLD-SCNC: 4.6 MMOL/L (ref 3.5–5.1)
POTASSIUM SERPL-SCNC: 4.6 MMOL/L (ref 3.5–5.1)
POTASSIUM SERPL-SCNC: 4.7 MMOL/L (ref 3.5–5.1)
PREALB SERPL-MCNC: 22 MG/DL (ref 20–43)
PROT SERPL-MCNC: 7.6 G/DL (ref 6–8.4)
PROTHROMBIN TIME: 32.7 SEC (ref 9–12.5)
RBC # BLD AUTO: 2.22 M/UL (ref 4.6–6.2)
SAMPLE: ABNORMAL
SITE: ABNORMAL
SODIUM BLD-SCNC: 130 MMOL/L (ref 136–145)
SODIUM SERPL-SCNC: 129 MMOL/L (ref 136–145)
SODIUM SERPL-SCNC: 130 MMOL/L (ref 136–145)
SP02: 100
SP02: 97
SP02: 99
WBC # BLD AUTO: 9.81 K/UL (ref 3.9–12.7)

## 2024-06-14 PROCEDURE — 63600175 PHARM REV CODE 636 W HCPCS: Performed by: NURSE PRACTITIONER

## 2024-06-14 PROCEDURE — 99291 CRITICAL CARE FIRST HOUR: CPT | Mod: FS,,, | Performed by: NURSE PRACTITIONER

## 2024-06-14 PROCEDURE — 83010 ASSAY OF HAPTOGLOBIN QUANT: CPT | Performed by: NURSE PRACTITIONER

## 2024-06-14 PROCEDURE — 25000003 PHARM REV CODE 250: Performed by: NURSE PRACTITIONER

## 2024-06-14 PROCEDURE — 97116 GAIT TRAINING THERAPY: CPT

## 2024-06-14 PROCEDURE — 36600 WITHDRAWAL OF ARTERIAL BLOOD: CPT

## 2024-06-14 PROCEDURE — 97530 THERAPEUTIC ACTIVITIES: CPT

## 2024-06-14 PROCEDURE — 99232 SBSQ HOSP IP/OBS MODERATE 35: CPT | Mod: ,,, | Performed by: PHYSICIAN ASSISTANT

## 2024-06-14 PROCEDURE — 82330 ASSAY OF CALCIUM: CPT

## 2024-06-14 PROCEDURE — 83615 LACTATE (LD) (LDH) ENZYME: CPT | Performed by: STUDENT IN AN ORGANIZED HEALTH CARE EDUCATION/TRAINING PROGRAM

## 2024-06-14 PROCEDURE — 83735 ASSAY OF MAGNESIUM: CPT | Performed by: STUDENT IN AN ORGANIZED HEALTH CARE EDUCATION/TRAINING PROGRAM

## 2024-06-14 PROCEDURE — 84134 ASSAY OF PREALBUMIN: CPT | Performed by: STUDENT IN AN ORGANIZED HEALTH CARE EDUCATION/TRAINING PROGRAM

## 2024-06-14 PROCEDURE — 99291 CRITICAL CARE FIRST HOUR: CPT | Mod: ,,, | Performed by: INTERNAL MEDICINE

## 2024-06-14 PROCEDURE — 85025 COMPLETE CBC W/AUTO DIFF WBC: CPT | Performed by: STUDENT IN AN ORGANIZED HEALTH CARE EDUCATION/TRAINING PROGRAM

## 2024-06-14 PROCEDURE — 90945 DIALYSIS ONE EVALUATION: CPT

## 2024-06-14 PROCEDURE — 99233 SBSQ HOSP IP/OBS HIGH 50: CPT | Mod: ,,, | Performed by: INTERNAL MEDICINE

## 2024-06-14 PROCEDURE — 94761 N-INVAS EAR/PLS OXIMETRY MLT: CPT | Mod: XB

## 2024-06-14 PROCEDURE — 93005 ELECTROCARDIOGRAM TRACING: CPT

## 2024-06-14 PROCEDURE — 85014 HEMATOCRIT: CPT

## 2024-06-14 PROCEDURE — 83051 HEMOGLOBIN PLASMA: CPT | Performed by: NURSE PRACTITIONER

## 2024-06-14 PROCEDURE — 82803 BLOOD GASES ANY COMBINATION: CPT

## 2024-06-14 PROCEDURE — 93010 ELECTROCARDIOGRAM REPORT: CPT | Mod: ,,, | Performed by: INTERNAL MEDICINE

## 2024-06-14 PROCEDURE — 25000003 PHARM REV CODE 250: Performed by: STUDENT IN AN ORGANIZED HEALTH CARE EDUCATION/TRAINING PROGRAM

## 2024-06-14 PROCEDURE — 21400001 HC TELEMETRY ROOM

## 2024-06-14 PROCEDURE — 93750 INTERROGATION VAD IN PERSON: CPT | Mod: ,,, | Performed by: INTERNAL MEDICINE

## 2024-06-14 PROCEDURE — 27000248 HC VAD-ADDITIONAL DAY

## 2024-06-14 PROCEDURE — 86140 C-REACTIVE PROTEIN: CPT | Performed by: STUDENT IN AN ORGANIZED HEALTH CARE EDUCATION/TRAINING PROGRAM

## 2024-06-14 PROCEDURE — 97165 OT EVAL LOW COMPLEX 30 MIN: CPT

## 2024-06-14 PROCEDURE — 80069 RENAL FUNCTION PANEL: CPT | Performed by: NURSE PRACTITIONER

## 2024-06-14 PROCEDURE — 80100008 HC CRRT DAILY MAINTENANCE

## 2024-06-14 PROCEDURE — 27000221 HC OXYGEN, UP TO 24 HOURS

## 2024-06-14 PROCEDURE — 85730 THROMBOPLASTIN TIME PARTIAL: CPT | Performed by: STUDENT IN AN ORGANIZED HEALTH CARE EDUCATION/TRAINING PROGRAM

## 2024-06-14 PROCEDURE — 84132 ASSAY OF SERUM POTASSIUM: CPT

## 2024-06-14 PROCEDURE — 83615 LACTATE (LD) (LDH) ENZYME: CPT | Mod: 91 | Performed by: NURSE PRACTITIONER

## 2024-06-14 PROCEDURE — 25000003 PHARM REV CODE 250

## 2024-06-14 PROCEDURE — 97535 SELF CARE MNGMENT TRAINING: CPT

## 2024-06-14 PROCEDURE — 97162 PT EVAL MOD COMPLEX 30 MIN: CPT

## 2024-06-14 PROCEDURE — 84100 ASSAY OF PHOSPHORUS: CPT | Performed by: STUDENT IN AN ORGANIZED HEALTH CARE EDUCATION/TRAINING PROGRAM

## 2024-06-14 PROCEDURE — 99900035 HC TECH TIME PER 15 MIN (STAT)

## 2024-06-14 PROCEDURE — 85610 PROTHROMBIN TIME: CPT | Performed by: STUDENT IN AN ORGANIZED HEALTH CARE EDUCATION/TRAINING PROGRAM

## 2024-06-14 PROCEDURE — 99292 CRITICAL CARE ADDL 30 MIN: CPT | Mod: ,,, | Performed by: INTERNAL MEDICINE

## 2024-06-14 PROCEDURE — 84295 ASSAY OF SERUM SODIUM: CPT

## 2024-06-14 PROCEDURE — 82800 BLOOD PH: CPT

## 2024-06-14 PROCEDURE — 83880 ASSAY OF NATRIURETIC PEPTIDE: CPT | Performed by: STUDENT IN AN ORGANIZED HEALTH CARE EDUCATION/TRAINING PROGRAM

## 2024-06-14 PROCEDURE — 80048 BASIC METABOLIC PNL TOTAL CA: CPT | Performed by: STUDENT IN AN ORGANIZED HEALTH CARE EDUCATION/TRAINING PROGRAM

## 2024-06-14 PROCEDURE — 80076 HEPATIC FUNCTION PANEL: CPT | Performed by: STUDENT IN AN ORGANIZED HEALTH CARE EDUCATION/TRAINING PROGRAM

## 2024-06-14 PROCEDURE — 83735 ASSAY OF MAGNESIUM: CPT | Mod: 91 | Performed by: NURSE PRACTITIONER

## 2024-06-14 RX ORDER — AMLODIPINE BESYLATE 5 MG/1
5 TABLET ORAL DAILY
Status: DISCONTINUED | OUTPATIENT
Start: 2024-06-14 | End: 2024-06-18

## 2024-06-14 RX ORDER — INSULIN ASPART 100 [IU]/ML
0-5 INJECTION, SOLUTION INTRAVENOUS; SUBCUTANEOUS
Status: DISCONTINUED | OUTPATIENT
Start: 2024-06-14 | End: 2024-06-18

## 2024-06-14 RX ORDER — GLUCAGON 1 MG
1 KIT INJECTION
Status: DISCONTINUED | OUTPATIENT
Start: 2024-06-14 | End: 2024-06-18

## 2024-06-14 RX ORDER — HYDROCODONE BITARTRATE AND ACETAMINOPHEN 500; 5 MG/1; MG/1
TABLET ORAL CONTINUOUS
Status: DISCONTINUED | OUTPATIENT
Start: 2024-06-14 | End: 2024-06-15

## 2024-06-14 RX ORDER — IBUPROFEN 200 MG
16 TABLET ORAL
Status: DISCONTINUED | OUTPATIENT
Start: 2024-06-14 | End: 2024-06-18

## 2024-06-14 RX ORDER — OXYCODONE HYDROCHLORIDE 5 MG/1
5 TABLET ORAL ONCE
Status: COMPLETED | OUTPATIENT
Start: 2024-06-14 | End: 2024-06-14

## 2024-06-14 RX ORDER — LIDOCAINE HYDROCHLORIDE 10 MG/ML
INJECTION INFILTRATION; PERINEURAL
Status: COMPLETED
Start: 2024-06-14 | End: 2024-06-14

## 2024-06-14 RX ORDER — PROCHLORPERAZINE EDISYLATE 5 MG/ML
2.5 INJECTION INTRAMUSCULAR; INTRAVENOUS EVERY 6 HOURS PRN
Status: DISCONTINUED | OUTPATIENT
Start: 2024-06-14 | End: 2024-06-15

## 2024-06-14 RX ORDER — IBUPROFEN 200 MG
24 TABLET ORAL
Status: DISCONTINUED | OUTPATIENT
Start: 2024-06-14 | End: 2024-06-18

## 2024-06-14 RX ORDER — SIMETHICONE 80 MG
1 TABLET,CHEWABLE ORAL 3 TIMES DAILY PRN
Status: DISCONTINUED | OUTPATIENT
Start: 2024-06-14 | End: 2024-07-01 | Stop reason: HOSPADM

## 2024-06-14 RX ORDER — MIRTAZAPINE 7.5 MG/1
7.5 TABLET, FILM COATED ORAL NIGHTLY
Status: DISCONTINUED | OUTPATIENT
Start: 2024-06-14 | End: 2024-06-15

## 2024-06-14 RX ORDER — MAGNESIUM SULFATE HEPTAHYDRATE 40 MG/ML
2 INJECTION, SOLUTION INTRAVENOUS
Status: DISCONTINUED | OUTPATIENT
Start: 2024-06-14 | End: 2024-06-15

## 2024-06-14 RX ORDER — WARFARIN 2.5 MG/1
2.5 TABLET ORAL DAILY
Status: DISCONTINUED | OUTPATIENT
Start: 2024-06-15 | End: 2024-06-15

## 2024-06-14 RX ADMIN — AMLODIPINE BESYLATE 5 MG: 5 TABLET ORAL at 08:06

## 2024-06-14 RX ADMIN — LIDOCAINE HYDROCHLORIDE: 10 INJECTION, SOLUTION INFILTRATION; PERINEURAL at 07:06

## 2024-06-14 RX ADMIN — ATORVASTATIN CALCIUM 40 MG: 40 TABLET, FILM COATED ORAL at 09:06

## 2024-06-14 RX ADMIN — PANTOPRAZOLE SODIUM 40 MG: 40 TABLET, DELAYED RELEASE ORAL at 08:06

## 2024-06-14 RX ADMIN — MIRTAZAPINE 7.5 MG: 7.5 TABLET, FILM COATED ORAL at 09:06

## 2024-06-14 RX ADMIN — AMOXICILLIN 500 MG: 500 CAPSULE ORAL at 09:06

## 2024-06-14 RX ADMIN — CHOLECALCIFEROL TAB 25 MCG (1000 UNIT) 1000 UNITS: 25 TAB at 08:06

## 2024-06-14 RX ADMIN — SIMETHICONE 80 MG: 80 TABLET, CHEWABLE ORAL at 06:06

## 2024-06-14 RX ADMIN — HYDRALAZINE HYDROCHLORIDE 100 MG: 50 TABLET ORAL at 01:06

## 2024-06-14 RX ADMIN — FOLIC ACID 1 MG: 1 TABLET ORAL at 08:06

## 2024-06-14 RX ADMIN — PROCHLORPERAZINE EDISYLATE 2.5 MG: 5 INJECTION INTRAMUSCULAR; INTRAVENOUS at 11:06

## 2024-06-14 RX ADMIN — ACETAMINOPHEN 650 MG: 325 TABLET ORAL at 12:06

## 2024-06-14 RX ADMIN — HYDRALAZINE HYDROCHLORIDE 100 MG: 50 TABLET ORAL at 06:06

## 2024-06-14 RX ADMIN — DEXTROSE MONOHYDRATE: 100 INJECTION, SOLUTION INTRAVENOUS at 04:06

## 2024-06-14 RX ADMIN — VENLAFAXINE 37.5 MG: 37.5 TABLET ORAL at 08:06

## 2024-06-14 RX ADMIN — SODIUM CHLORIDE: 9 INJECTION, SOLUTION INTRAVENOUS at 03:06

## 2024-06-14 RX ADMIN — HYDRALAZINE HYDROCHLORIDE 100 MG: 50 TABLET ORAL at 09:06

## 2024-06-14 RX ADMIN — PROCHLORPERAZINE EDISYLATE 2.5 MG: 5 INJECTION INTRAMUSCULAR; INTRAVENOUS at 08:06

## 2024-06-14 RX ADMIN — OXYCODONE 5 MG: 5 TABLET ORAL at 04:06

## 2024-06-14 RX ADMIN — ACETAMINOPHEN 650 MG: 325 TABLET ORAL at 10:06

## 2024-06-14 RX ADMIN — AMOXICILLIN 500 MG: 500 CAPSULE ORAL at 08:06

## 2024-06-14 RX ADMIN — AMIODARONE HYDROCHLORIDE 400 MG: 200 TABLET ORAL at 08:06

## 2024-06-14 NOTE — PT/OT/SLP EVAL
Physical Therapy Co-Evaluation    Patient Name:  Radha Abbott   MRN:  [id number]    Recommendations:     Discharge Recommendations: High Intensity Therapy   Discharge Equipment Recommendations: to be determined by next level of care   Barriers to discharge:  inc level of assist required, ongoing medical care    Assessment:     Radha Abbott is a 64 y.o. male admitted with a medical diagnosis of Acute renal failure superimposed on stage 4 chronic kidney disease.  He presents with the following impairments/functional limitations: weakness, impaired endurance, impaired functional mobility, impaired self care skills, gait instability, impaired balance, decreased upper extremity function, decreased lower extremity function, decreased safety awareness, pain, impaired cardiopulmonary response to activity. Pt easily fatigued and w/ poor verbalization of symptoms but able to ambulate 3' w/ to the chair w/ inc assistance needed when turning. Patient presents with good participation and motivation to return to prior level of function with high intensity therapy.  The patient demonstrates appropriate strength to participate in up to 3 hours or 15hrs of combined therapy post acute.     Rehab Prognosis: Good; patient would benefit from acute skilled PT services to address these deficits and reach maximum level of function.    Recent Surgery: * No surgery found *      Plan:     During this hospitalization, patient to be seen 4 x/week to address the identified rehab impairments via gait training, therapeutic activities, therapeutic exercises, neuromuscular re-education and progress toward the following goals:    Plan of Care Expires:  07/14/24    Subjective     Chief Complaint: back pain w/ movement, none at rest  Patient/Family Comments/goals: pt's wife reports he did better today than at home last week  Pain/Comfort:  Pain Rating 1: 0/10  Pain Rating Post-Intervention 1: 0/10    Patients cultural, spiritual, Hinduism conflicts given  the current situation: no    Living Environment:  Pt lives w/ wife in a SSH w/ threshold JACKIE. He has a WIS w/ built in shower chair and grab bars  Prior to admission, patients level of function was assistance needed the last 2 wks, prior to that mod-I w/ rollator and R AFO.  Equipment used at home: bedside commode, grab bar, rollator, shower chair.  DME owned (not currently used): none.  Upon discharge, patient will have assistance from wife.    Objective:     Communicated with RN prior to session.  Patient found HOB elevated with arterial line, blood pressure cuff, LVAD, oxygen, peripheral IV, pulse ox (continuous), telemetry, Trialysis  upon PT entry to room. Co-evaluation w/ OT 2/2 suspected pt complexity and requirement of assistance from 2 skilled therapists to maximize treatment potential and maintain pt safety     General Precautions: Standard, fall, LVAD  Orthopedic Precautions:N/A   Braces: N/A  Respiratory Status: Nasal cannula, flow 2 L/min    Exams:  Cognitive Exam:  Patient is oriented to Person, Place, Time, and Situation  Sensation: intact light touch  RLE ROM: WFL  RLE Strength: Deficits: 3-/5  LLE ROM: WFL  LLE Strength: Deficits: 3/5    Functional Mobility:  Bed Mobility:     Scooting: maximal assistance  Supine to Sit: moderate assistance and of 2 persons  Transfers:     Sit to Stand:  minimum assistance and of 2 persons with rolling walker  Bed to Chair: moderate assistance and of 2 persons with  rolling walker  using  Step Transfer  Gait: 3' w/ RW Yonis when straight; modA x2 RW when turning  Balance: EOB sitting balance SBA      AM-PAC 6 CLICK MOBILITY  Total Score:11       Treatment & Education:  Pt educated on PT POC/goals, d/c recs, and continued treatment. All questions answered and pt in agreement w/ POC.  Pt assisted w/ transferring on/off of battery power 2/2 fatigue  Gait training w/ verbal, visual, and tactile cueing for proper use of AD, step length, step width, step height, postural  control, safety awareness, width of AURORA, proximity to AD, and attention to task     Patient left up in chair with all lines intact, call button in reach, RN notified, and wife present.    GOALS:   Multidisciplinary Problems       Physical Therapy Goals          Problem: Physical Therapy    Goal Priority Disciplines Outcome Goal Variances Interventions   Physical Therapy Goal     PT, PT/OT Progressing     Description: Goals to be completed by: 7/14/24    Pt will perform sup<>sit transfers w/ contact guard assistance  Pt will have sufficient dynamic balance to sit EOB while performing ADLs/therex w/ supervision  Pt will be able to stand up from EOB w/ contact guard assistance using LRAD  Pt will ambulate 100 feet w/ minimum assistance using LRAD  Pt will be independent w/ HEP therex on BLE w/ good form and ROM                        History:     Past Medical History:   Diagnosis Date    CAD (coronary artery disease)     CHF (congestive heart failure)     Diabetes mellitus     HFrEF (heart failure with reduced ejection fraction)     ICD (implantable cardioverter-defibrillator) in place     MI, old     Type 2 diabetes mellitus without complication, without long-term current use of insulin 10/07/2023       Past Surgical History:   Procedure Laterality Date    ANGIOPLASTY-VENOUS ARTERY Right 12/1/2023    Procedure: ANGIOPLASTY-VENOUS ARTERY, RIGHT FEMORAL;  Surgeon: Yuri Washington MD;  Location: [facility] OR [address] [place] [address];  Service: Cardiovascular;  Laterality: Right;    AORTIC VALVULOPLASTY N/A 12/1/2023    Procedure: REPAIR, AORTIC VALVE;  Surgeon: Yuri Washington MD;  Location: [address];  Service: Cardiovascular;  Laterality: N/A;    CARDIAC SURGERY      CLOSURE N/A 12/1/2023    Procedure: CLOSURE, TEMPORARY;  Surgeon: Yuri Washington MD;  Location: [address];  Service: Cardiovascular;  Laterality: N/A;    DRAINAGE OF PLEURAL EFFUSION  12/4/2023    Procedure: DRAINAGE, PLEURAL EFFUSION;  Surgeon: Yuri Washington MD;   Location: [facility] OR [facility] FLR;  Service: Cardiovascular;;    INSERTION OF GRAFT TO PERICARDIUM  12/4/2023    Procedure: INSERTION, GRAFT, PERICARDIUM;  Surgeon: Yuri Washington MD;  Location: [facility] OR [facility] FLR;  Service: Cardiovascular;;    INSERTION OF INTRA-AORTIC BALLOON ASSIST DEVICE Right 11/21/2023    Procedure: INSERTION, INTRA-AORTIC BALLOON PUMP;  Surgeon: Finn Cohn MD;  Location: [facility] CATH LAB;  Service: Cardiology;  Laterality: Right;    LEFT VENTRICULAR ASSIST DEVICE Left 12/1/2023    Procedure: INSERTION-LEFT VENTRICULAR ASSIST DEVICE;  Surgeon: Yuri Washington MD;  [facility];  Service: Cardiovascular;  Laterality: Left;  REDO STERNOTOMY - REDO SAW NEEDED FOR CASE    LYSIS OF ADHESIONS  12/1/2023    Procedure: LYSIS, ADHESIONS;  Surgeon: Yuri Washington MD;  [facility];  Service: Cardiovascular;;    PLACEMENT OF SWAN [place]LANDO CATHETER WITH IMAGING GUIDANCE  11/20/2023    Procedure: INSERTION, CATHETER, SWAN-ROLANDO, WITH IMAGING GUIDANCE;  Surgeon: Sajan Hurley MD;  Location: [facility] CATH LAB;  Service: Cardiology;;    REMOVAL OF TUNNELED CENTRAL VENOUS CATHETER (CVC) N/A 3/1/2024    Procedure: REMOVAL, CATHETER, CENTRAL VENOUS, TUNNELED;  Surgeon: Seble Aguilar MD;  Location: [facility] CATH LAB;  Service: Interventional Nephrology;  Laterality: N/A;    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 12/1/2023    Procedure: REPAIR, ANEURYSM, ARTERY, FEMORAL;  Surgeon: Yuri Washington MD;  [address]  Service: Cardiovascular;  Laterality: Right;  Right Femoral Artery Repair    RIGHT HEART CATHETERIZATION Right 10/10/2023    Procedure: INSERTION, CATHETER, RIGHT HEART;  Surgeon: Bin Gandhi MD;  Location: [facility] CATH LAB;  Service: Cardiology;  Laterality: Right;    RIGHT HEART CATHETERIZATION Right 10/13/2023    Procedure: INSERTION, CATHETER, RIGHT HEART;  Surgeon: Walter Mcintyre MD;  Location: [facility] CATH LAB;  Service: Cardiology;  Laterality: Right;     RIGHT HEART CATHETERIZATION  11/13/2023    RIGHT HEART CATHETERIZATION Right 11/13/2023    Procedure: INSERTION, CATHETER, RIGHT HEART;  Surgeon: Juventino Bermudez Jr., MD;  Location: [facility] CATH LAB;  Service: Cardiology;  Laterality: Right;    RIGHT HEART CATHETERIZATION Right 11/20/2023    Procedure: INSERTION, CATHETER, RIGHT HEART;  Surgeon: Sajan Hurley MD;  Location: [facility] CATH LAB;  Service: Cardiology;  Laterality: Right;    RIGHT HEART CATHETERIZATION Right 1/22/2024    Procedure: INSERTION, CATHETER, RIGHT HEART;  Surgeon: Brayan Ocampo MD;  Location: [facility] CATH LAB;  Service: Cardiology;  Laterality: Right;    STERNAL WOUND CLOSURE N/A 12/4/2023    Procedure: CLOSURE, WOUND, STERNUM;  Surgeon: Yuri Washington MD;  Location: [facility] OR 2ND FLR;  Service: Cardiovascular;  Laterality: N/A;    STERNOTOMY N/A 12/1/2023    Procedure: STERNOTOMY, REDO;  Surgeon: Yuri Washington MD;  Location: [facility] OR [facility] FLR;  Service: Cardiovascular;  Laterality: N/A;    VALVULOPLASTY, MITRAL VALVE N/A 12/1/2023    Procedure: VALVULOPLASTY, MITRAL VALVE;  Surgeon: Yuri Washington MD;  Location: [facility] OR [facility] FLR;  Service: Cardiovascular;  Laterality: N/A;       Time Tracking:     PT Received On: 06/14/24  PT Start Time: 1022     PT Stop Time: 1053  PT Total Time (min): 31 min     Billable Minutes: Evaluation 8, Gait Training 15, and Therapeutic Activity 8      06/14/2024

## 2024-06-14 NOTE — SUBJECTIVE & OBJECTIVE
"Interval HPI:   No acute events overnight. Patient in room [id number]/TLXJ0201 A. Blood glucose stable. BG at and above goal on current insulin regimen (None). Steroid use- None .      Renal function- Abnormal -    Vasopressors-  None     Diet Renal Coumadin Restriction; Fluid - 2000mL     Eatin%  Nausea: No  Hypoglycemia and intervention: No  Fever: No  TPN and/or TF: No  BP (S) (!) 90/0   Pulse 77   Temp 97.7 °F (36.5 °C) (Oral)   Resp 19   Ht 6' 1" (1.854 m)   Wt 81.5 kg (179 lb 10.8 oz)   SpO2 (!) 90%   BMI 23.71 kg/m²     Labs Reviewed and Include    Recent Labs   Lab 24   *  129*  129*   CALCIUM 9.0  9.0  9.0   ALBUMIN 2.5*  2.5*  2.5*   PROT 7.6   *  129*  129*   K 4.7  4.7  4.7   CO2 17*  17*  17*     100  100   BUN 30*  30*  30*   CREATININE 3.3*  3.3*  3.3*   ALKPHOS 165*   ALT 65*   AST 64*   BILITOT 0.4     Lab Results   Component Value Date    WBC 9.81 2024    HGB 6.2 (L) 2024    HCT 20.3 (L) 2024    MCV 91 2024     2024     No results for input(s): "TSH", "FREET4" in the last 168 hours.  Lab Results   Component Value Date    HGBA1C 7.4 (H) 2024       Nutritional status:   Body mass index is 23.71 kg/m².  Lab Results   Component Value Date    ALBUMIN 2.5 (L) 2024    ALBUMIN 2.5 (L) 2024    ALBUMIN 2.5 (L) 2024     Lab Results   Component Value Date    PREALBUMIN 22 2024    PREALBUMIN 25 2024    PREALBUMIN 23 2024       Estimated Creatinine Clearance: 26.2 mL/min (A) (based on SCr of 3.3 mg/dL (H)).    Accu-Checks  Recent Labs     24  0736 24  0758 24  1029 24  1220 24  1546 24  2035 24  2119 24  0104 24  0647 24  0748   POCTGLUCOSE 155* 209* 123* 127* 121* 136* 141* 126* 193* 182*       Current Medications and/or Treatments Impacting Glycemic Control  Immunotherapy:    Immunosuppressants       None      "     Steroids:   Hormones (From admission, onward)      None          Pressors:    Autonomic Drugs (From admission, onward)      None          Hyperglycemia/Diabetes Medications:   Antihyperglycemics (From admission, onward)      Start     Stop Route Frequency Ordered    06/14/24 0910  insulin aspart U-100 pen 0-5 Units         -- SubQ Before meals & nightly PRN 06/14/24 0811

## 2024-06-14 NOTE — PROGRESS NOTES
06/14/24 1536   Treatment   Treatment Type SLED   Treatment Status Restart   Dialysis Machine Number K [id number]   Solutions Labeled and Current  Yes   Access Temporary Cath;Right;IJ   Catheter Dressing Intact  Yes   Alarms Engaged Yes   CRRT Comments SLED started   Prescription   Time (Hours) 12   Dialysate K + (mEq/L) 4   Dialysate CA + (mEq/L) 2.25   Dialysate HCO3 - (Bicarb) (mEq/L) 25   Dialysate Na + (mEq/L) 135   Cartridge Type Other  (r300)   Dialysate Flow Rate (mL/min) 100   UF Goal Rate 450 mL/hr   CRRT Hourly Documentation   Blood Flow (mL/min) 150   UF Rate 200 cc/hr   Arterial Pressure (mmHg) -20 mmHg   Venous Pressure (mmHg) 40 mmHg   Effluent Pressure (EP) (mmHg) 10 mmHg   Total UF (Hourly Cleared) (mL) 0     SLED started X 12 HRS. Report given to primary nurse at BS.

## 2024-06-14 NOTE — SUBJECTIVE & OBJECTIVE
Interval History: Hampton D/C'd yesterday evening 2/2 penile discomfort and has had only 30 cc UOP since. Bladder scan showed ) residual. Tolerated 8 hour SLED, essentially net even. Bumex continues at 1 mg/hr, CVP 23. Nephrology plans to remove fluid today. He is sleepy this morning but slept poorly last night, 2 episodes of N/V. Eating only 25% of meals - adding low dose Mirtazapine. A lot of MAP's have been 90's-100's despite Hydralazine, adding Norvasc. LDH has jumped to 734 (re checked), likely an acute phase reactant - getting Haptoglobin, plasma free Hgb and TTE    Continuous Infusions:   sodium chloride 0.9%   Intravenous Continuous   Stopped at 06/14/24 0104    sodium chloride 0.9%   Intravenous Continuous   Stopped at 06/13/24 0117    bumetanide (BUMEX) 25 mg in 100 mL infusion (conc: 0.25 mg/mL)  1 mg/hr Intravenous Continuous 4 mL/hr at 06/14/24 0801 1 mg/hr at 06/14/24 0801     Scheduled Meds:   amiodarone  400 mg Oral Daily    amLODIPine  5 mg Oral Daily    amoxicillin  500 mg Oral Q12H    atorvastatin  40 mg Oral QHS    folic acid  1 mg Oral Daily    hydrALAZINE  100 mg Oral Q8H    mirtazapine  7.5 mg Oral QHS    pantoprazole  40 mg Oral Daily    venlafaxine  37.5 mg Oral Daily    vitamin D  1,000 Units Oral Daily    [START ON 6/15/2024] warfarin  2.5 mg Oral Daily     PRN Meds:  Current Facility-Administered Medications:     acetaminophen, 650 mg, Oral, Q6H PRN    dextrose 10%, 12.5 g, Intravenous, PRN    dextrose 10%, 25 g, Intravenous, PRN    glucagon (human recombinant), 1 mg, Intramuscular, PRN    glucagon (human recombinant), 1 mg, Intramuscular, PRN    glucose, 16 g, Oral, PRN    glucose, 24 g, Oral, PRN    insulin aspart U-100, 0-5 Units, Subcutaneous, QID (AC + HS) PRN    magnesium sulfate IVPB, 2 g, Intravenous, PRN    prochlorperazine, 2.5 mg, Intravenous, Q6H PRN    simethicone, 1 tablet, Oral, TID PRN    traZODone, 25 mg, Oral, Nightly PRN    Review of patient's allergies indicates:  No  Known Allergies  Objective:     Vital Signs (Most Recent):  Temp: 97.7 °F (36.5 °C) (06/14/24 0701)  Pulse: 77 (06/14/24 0746)  Resp: 19 (06/14/24 0746)  BP: (S) (!) 90/0 (06/14/24 0715)  SpO2: (!) 90 % (06/14/24 0724) Vital Signs (24h Range):  Temp:  [97 °F (36.1 °C)-97.9 °F (36.6 °C)] 97.7 °F (36.5 °C)  Pulse:  [71-91] 77  Resp:  [16-26] 19  SpO2:  [90 %-100 %] 90 %  BP: ()/(0-96) 90/0  Arterial Line BP: ()/(71-75) 93/71     Patient Vitals for the past 72 hrs (Last 3 readings):   Weight   06/14/24 0400 81.5 kg (179 lb 10.8 oz)   06/13/24 0400 80 kg (176 lb 5.9 oz)   06/12/24 0401 80.5 kg (177 lb 7.5 oz)     Body mass index is 23.71 kg/m².      Intake/Output Summary (Last 24 hours) at 6/14/2024 0833  Last data filed at 6/14/2024 0801  Gross per 24 hour   Intake 2756.98 ml   Output 2828 ml   Net -71.02 ml       Hemodynamic Parameters:       Telemetry: SR       Physical Exam  Constitutional:       Appearance: Normal appearance.   HENT:      Head: Normocephalic and atraumatic.   Eyes:      Conjunctiva/sclera: Conjunctivae normal.      Pupils: Pupils are equal, round, and reactive to light.   Neck:      Comments: RIJ trialysis cath. JVP at earlobe  Cardiovascular:      Rate and Rhythm: Normal rate and regular rhythm.      Comments: Smooth VAD hum  Pulmonary:      Effort: Pulmonary effort is normal.      Breath sounds: Normal breath sounds.   Abdominal:      General: Bowel sounds are normal.      Palpations: Abdomen is soft.   Musculoskeletal:         General: No swelling. Normal range of motion.      Cervical back: Normal range of motion and neck supple.   Skin:     General: Skin is warm and dry.      Capillary Refill: Capillary refill takes 2 to 3 seconds.   Neurological:      General: No focal deficit present.      Mental Status: He is alert and oriented to person, place, and time.            Significant Labs:  CBC:  Recent Labs   Lab 06/12/24  2120 06/13/24  0349 06/14/24  0221   WBC 10.80 9.06 9.81    RBC 2.24* 2.27* 2.22*   HGB 6.5* 6.5* 6.2*   HCT 20.2* 20.4* 20.3*    298 312   MCV 90 90 91   MCH 29.0 28.6 27.9   MCHC 32.2 31.9* 30.5*     BNP:  Recent Labs   Lab 06/11/24  1933 06/14/24  0221   BNP 1,388* 1,542*     CMP:  Recent Labs   Lab 06/11/24  1605 06/11/24  2227 06/12/24  0345 06/12/24  1422 06/13/24  1540 06/13/24 2118 06/14/24 0221   GLU 84   < > 32*   < > 110 126*  126* 129*  129*  129*   CALCIUM 8.7   < > 8.9   < > 8.7 8.6*  8.6* 9.0  9.0  9.0   ALBUMIN 2.6*  --  2.5*   < > 2.3* 2.4*  2.4* 2.5*  2.5*  2.5*   PROT 7.6  --  7.5  --   --   --  7.6      < > 136   < > 130* 130*  130* 129*  129*  129*   K 4.1   < > 3.9   < > 4.5 4.4  4.4 4.7  4.7  4.7   CO2 20*   < > 20*   < > 19* 19*  19* 17*  17*  17*   CL 99   < > 99   < > 97 99  99 100  100  100   *   < > 114*   < > 74* 41*  41* 30*  30*  30*   CREATININE 9.9*   < > 9.5*   < > 7.0* 3.8*  3.8* 3.3*  3.3*  3.3*   ALKPHOS 142*  --  148*  --   --   --  165*   ALT 59*  --  67*  --   --   --  65*   AST 58*  --  73*  --   --   --  64*   BILITOT 0.2  --  0.2  --   --   --  0.4    < > = values in this interval not displayed.      Coagulation:   Recent Labs   Lab 06/12/24  0345 06/13/24  0349 06/14/24  0221   INR 2.4* 2.8* 3.2*   APTT 45.2* 48.4* 53.5*     LDH:  Recent Labs   Lab 06/12/24  0345 06/13/24  0349 06/14/24  0221   * 422* 506*     Microbiology:  Microbiology Results (last 7 days)       Procedure Component Value Units Date/Time    Blood culture #1 **CANNOT BE ORDERED STAT** [[id number]] Collected: 06/11/24 2122    Order Status: Completed Specimen: Blood from Peripheral, Antecubital, Right Updated: 06/13/24 2213     Blood Culture, Routine No Growth to date      No Growth to date      No Growth to date    Blood culture #2 **CANNOT BE ORDERED STAT** [[id number]] Collected: 06/11/24 2121    Order Status: Completed Specimen: Blood from Peripheral, Hand, Left Updated: 06/13/24 2213     Blood Culture,  Routine No Growth to date      No Growth to date      No Growth to date    Urine culture [[id number]] Collected: 06/12/24 1422    Order Status: Completed Specimen: Urine Updated: 06/13/24 2113     Urine Culture, Routine No growth    Narrative:      Specimen Source->Urine            I have reviewed all pertinent labs within the past 24 hours.    Estimated Creatinine Clearance: 26.2 mL/min (A) (based on SCr of 3.3 mg/dL (H)).    Diagnostic Results:  I have reviewed and interpreted all pertinent imaging results/findings within the past 24 hours.

## 2024-06-14 NOTE — SUBJECTIVE & OBJECTIVE
Interval History:   8 hour SLED completed overnight, net even over the past 24 hours.  CVP > 20.    Electrolytes stable.  Minimal UOP.  Seen at bedside, moved from bedside chair and developed SOB and CP.  Hypervolemic on exam.    Review of patient's allergies indicates:  No Known Allergies  Current Facility-Administered Medications   Medication Frequency    0.9%  NaCl infusion (CRRT USE ONLY) Continuous    0.9%  NaCl infusion (CRRT USE ONLY) Continuous    0.9%  NaCl infusion Continuous    acetaminophen tablet 650 mg Q6H PRN    amiodarone tablet 400 mg Daily    amLODIPine tablet 5 mg Daily    amoxicillin capsule 500 mg Q12H    atorvastatin tablet 40 mg QHS    bumetanide (BUMEX) 25 mg in 100 mL infusion (conc: 0.25 mg/mL) Continuous    dextrose 10% bolus 125 mL 125 mL PRN    dextrose 10% bolus 250 mL 250 mL PRN    folic acid tablet 1 mg Daily    glucagon (human recombinant) injection 1 mg PRN    glucagon (human recombinant) injection 1 mg PRN    glucose chewable tablet 16 g PRN    glucose chewable tablet 24 g PRN    hydrALAZINE tablet 100 mg Q8H    insulin aspart U-100 pen 0-5 Units QID (AC + HS) PRN    magnesium sulfate 2g in water 50mL IVPB (premix) PRN    mirtazapine tablet 7.5 mg QHS    oxyCODONE immediate release tablet 5 mg Once    pantoprazole EC tablet 40 mg Daily    prochlorperazine injection Soln 2.5 mg Q6H PRN    simethicone chewable tablet 80 mg TID PRN    trazodone split tablet 25 mg Nightly PRN    venlafaxine tablet 37.5 mg Daily    vitamin D 1000 units tablet 1,000 Units Daily    [START ON 6/15/2024] warfarin (COUMADIN) tablet 2.5 mg Daily       Objective:     Vital Signs (Most Recent):  Temp: 97.8 °F (36.6 °C) (06/14/24 1101)  Pulse: 72 (06/14/24 1528)  Resp: (!) 22 (06/14/24 1528)  BP: (!) 88/0 (06/14/24 1401)  SpO2: 97 % (06/14/24 1501) Vital Signs (24h Range):  Temp:  [97 °F (36.1 °C)-97.9 °F (36.6 °C)] 97.8 °F (36.6 °C)  Pulse:  [71-91] 72  Resp:  [17-29] 22  SpO2:  [90 %-100 %] 97 %  BP:  ()/(0-96) 88/0     Weight: 81.5 kg (179 lb 10.8 oz) (06/14/24 0400)  Body mass index is 23.71 kg/m².  Body surface area is 2.05 meters squared.    I/O last 3 completed shifts:  In: 4372.6 [P.O.:150; I.V.:4222.6]  Out: 3869 [Urine:500; Other:3369]     Physical Exam  Vitals and nursing note reviewed.   Constitutional:       General: He is not in acute distress.     Appearance: Normal appearance. He is not ill-appearing or toxic-appearing.   HENT:      Head: Normocephalic and atraumatic.      Right Ear: External ear normal.      Left Ear: External ear normal.      Nose: Nose normal.      Mouth/Throat:      Mouth: Mucous membranes are moist.   Eyes:      Extraocular Movements: Extraocular movements intact.   Cardiovascular:      Rate and Rhythm: Normal rate and regular rhythm.      Pulses: Normal pulses.      Heart sounds: Normal heart sounds. No murmur heard.  Pulmonary:      Effort: Pulmonary effort is normal. No respiratory distress.      Breath sounds: Normal breath sounds. No wheezing or rales.   Chest:      Chest wall: No tenderness.   Abdominal:      General: Abdomen is flat. There is distension.      Palpations: Abdomen is soft.   Musculoskeletal:         General: No swelling or deformity. Normal range of motion.      Cervical back: Normal range of motion and neck supple. No rigidity or tenderness.      Right lower leg: No edema.      Left lower leg: No edema.   Skin:     General: Skin is warm and dry.      Capillary Refill: Capillary refill takes 2 to 3 seconds.   Neurological:      General: No focal deficit present.      Mental Status: He is alert and oriented to person, place, and time.   Psychiatric:         Mood and Affect: Mood normal.         Behavior: Behavior normal.         Thought Content: Thought content normal.         Judgment: Judgment normal.          Significant Labs:  CBC:   Recent Labs   Lab 06/14/24  0221   WBC 9.81   RBC 2.22*   HGB 6.2*   HCT 20.3*      MCV 91   MCH 27.9   MCHC  30.5*     CMP:   Recent Labs   Lab 06/14/24  0221 06/14/24  1313   *  129*  129* 117*  117*  117*   CALCIUM 9.0  9.0  9.0 8.9  8.9  8.9   ALBUMIN 2.5*  2.5*  2.5* 2.4*  2.4*  2.4*   PROT 7.6  --    *  129*  129* 129*  129*  129*   K 4.7  4.7  4.7 4.6  4.6  4.6   CO2 17*  17*  17* 18*  18*  18*     100  100 99  99  99   BUN 30*  30*  30* 41*  41*  41*   CREATININE 3.3*  3.3*  3.3* 4.0*  4.0*  4.0*   ALKPHOS 165*  --    ALT 65*  --    AST 64*  --    BILITOT 0.4  --

## 2024-06-14 NOTE — NURSING
"Pt endorsed 5/10 chest pain. Stated " felt like elephant was sitting on my chest." RN called HTS fellow and Charge RN to bedside. 12 lead EKG performed, and reading was NSR. Doppler 85/0. MD Lantigua at bedside. No new orders at this time.   "

## 2024-06-14 NOTE — ASSESSMENT & PLAN NOTE
BG goal 140-180  T2DM.  LVAD. Admitted for worsening renal function with a BUN>100/Cr. 9.9. Hypoglycemia likely secondary to RIVAS (glimepiride) with worsened kidney function. Last took on 6/11. On D10 drip for hypoglycemia. BG now elevating.    Plan:  -Recommend holding D10 infusion   -Novolog /50  -BG monitoring ac/hs  -Hypoglycemia protocol in place    ** Please call Endocrine for any BG related issues **    Discharge plans:   [facility]  -D/C [facility]

## 2024-06-14 NOTE — PROCEDURES
Interrogation of Ventricular assist device was performed with physician analysis of device parameters and review of device function. I have personally reviewed the interrogation findings and agree with findings as stated.   Procedure: Device Interrogation Including analysis of device parameters  Current Settings: Ventricular Assist Device  Review of device function is stable      6/13/2024     7:01 PM 6/13/2024     6:01 PM 6/13/2024     5:01 PM 6/13/2024     4:01 PM 6/13/2024     3:01 PM 6/13/2024     2:01 PM 6/13/2024     1:01 PM   TXP LVAD INTERROGATIONS   Type HeartMate3 HeartMate3 HeartMate3 HeartMate3 HeartMate3 HeartMate3 HeartMate3   Flow 4.2 4.2 4.3 4.3 4.2 4.1 4   Speed 5000 5000 5000 5000 5000 5000 5000   PI 3.7 3.6 3.5 3.8 3.6 3.9 4.3   Power (Carrington) 3.4 3.3 3.3 3.3 3.4 3.3 3.3   LSL 4600 4600 4600 4600 4600 4600 4600   Pulsatility Intermittent pulse Intermittent pulse Intermittent pulse Intermittent pulse Intermittent pulse Intermittent pulse Intermittent pulse      Brayan Ocampo MD

## 2024-06-14 NOTE — ASSESSMENT & PLAN NOTE
Patient with a baseline eGFR that appears to be variable however was noted to be in the 15-20 range in February before his recent IVÁN in May, presents now with fatigue and worsening IVÁN with a creatinine of 9.8 and eGFR of 5.5. BUN was noted to be elevated at 117, other electrolytes were normal. He was noted to have some asterixis on exam (which he states is chronic for him and unchanged from his baseline), however denies any other signs or symptoms of uremia. Bedside bladder scan revealed 200 mL of urine in his bladder and his last bladder void was 10 hours prior to this consult.     SLED initiated 6/13 x4 hours.  Mental status improved.  6/14:  8 hour SLED overnight, net even.    Recommendations  -Will plan to restart SLED today x 12 hours  -net -450 cc/hr as tolerated;  Goal to be negative 1-2L/24h if able to tolerate without pressor support  -recommend PRBC transfusion with SLED  -electrolytes will be adjusted to current electrolytes  -Agree with current diuresis medications  -Avoid nephrotoxic agents (IV contrast, NSAID's, gadolinium contrast, PPI's) if able.   -Strict I's and O's  -RFP q 8 hours while on RRT.  PRN mg/phos replacement  -Renally dose all medications  -will need OP HD upon discharge, recommend CM/SW involvement to start workup.  -will need IR evaluation once out of ICU for TDC placement.

## 2024-06-14 NOTE — CARE UPDATE
Hemodynamics:   Parameter   at 2000   CVP 21   SvO2 45   CO  5.9   CI 2.9         sodium chloride 0.9%   Intravenous Continuous 200 mL/hr at 06/13/24 2301 Rate Verify at 06/13/24 2301    sodium chloride 0.9%   Intravenous Continuous   Stopped at 06/13/24 0117    bumetanide (BUMEX) 25 mg in 100 mL infusion (conc: 0.25 mg/mL)  1 mg/hr Intravenous Continuous 4 mL/hr at 06/13/24 2301 1 mg/hr at 06/13/24 2301    dextrose 10 % in water (D10W)   Intravenous Continuous 25 mL/hr at 06/13/24 2301 Rate Verify at 06/13/24 2301     Recent Labs   Lab 06/13/24 2118   *  130*   K 4.4  4.4   CL 99  99   CO2 19*  19*   BUN 41*  41*   CREATININE 3.8*  3.8*   CALCIUM 8.6*  8.6*   MG 1.9  1.9     Recent Labs   Lab 06/13/24  0349   WBC 9.06   RBC 2.27*   HGB 6.5*   HCT 20.4*      MCV 90   MCH 28.6   MCHC 31.9*     Recent Labs   Lab 06/13/24  0349   INR 2.8*   APTT 48.4*         UOP 24Hours:   Intake/Output Summary (Last 24 hours) at 6/13/2024 2353  Last data filed at 6/13/2024 2301  Gross per 24 hour   Intake 2698.65 ml   Output 2136 ml   Net 562.65 ml       Assessment/Plan:  - Discussed with nephrology and increasing UF rate to 450.   - Plan was discussed with on-call attending    Bernardino Brand MD  Cardiovascular Disease PGY-5  [facility]

## 2024-06-14 NOTE — ASSESSMENT & PLAN NOTE
Chronic with no acute bleeding, multifactorial 2/2 CKD4 and chronic anticoagulation    -Hgb 6.2, normally in the 6's - 7's, likely dilutional  -Iron panel done. Iron deficient but Ferritin is elevated at 1186  -Continue to trend

## 2024-06-14 NOTE — PLAN OF CARE
PT Eval complete, appropriate goals created    Problem: Physical Therapy  Goal: Physical Therapy Goal  Description: Goals to be completed by: 7/14/24    Pt will perform sup<>sit transfers w/ contact guard assistance  Pt will have sufficient dynamic balance to sit EOB while performing ADLs/therex w/ supervision  Pt will be able to stand up from EOB w/ contact guard assistance using LRAD  Pt will ambulate 100 feet w/ minimum assistance using LRAD  Pt will be independent w/ HEP therex on BLE w/ good form and ROM   Outcome: Progressing

## 2024-06-14 NOTE — PROGRESS NOTES
[facility] - Cardiac Intensive Care  Heart Transplant  Progress Note    Patient Name: Radha Abbott  MRN: [id number]  Admission Date: 6/11/2024  Hospital Length of Stay: 3 days  Attending Physician: Sajan Hurley, *  Primary Care Provider: Vasu Kong MD  Principal Problem:Acute renal failure superimposed on stage 4 chronic kidney disease    Subjective:   Interval History: [place] D/C'd yesterday evening 2/2 penile discomfort and has had only 30 cc UOP since. Bladder scan showed ) residual. Tolerated 8 hour SLED, essentially net even. Bumex continues at 1 mg/hr, CVP 23. Nephrology plans to remove fluid today. He is sleepy this morning but slept poorly last night, 2 episodes of N/V. Eating only 25% of meals - adding low dose Mirtazapine. A lot of MAP's have been 90's-100's despite Hydralazine, adding Norvasc. LDH has jumped to 734 (re checked), likely an acute phase reactant - getting Haptoglobin, plasma free Hgb and TTE    Continuous Infusions:   sodium chloride 0.9%   Intravenous Continuous   Stopped at 06/14/24 0104    sodium chloride 0.9%   Intravenous Continuous   Stopped at 06/13/24 0117    bumetanide (BUMEX) 25 mg in 100 mL infusion (conc: 0.25 mg/mL)  1 mg/hr Intravenous Continuous 4 mL/hr at 06/14/24 0801 1 mg/hr at 06/14/24 0801     Scheduled Meds:   amiodarone  400 mg Oral Daily    amLODIPine  5 mg Oral Daily    amoxicillin  500 mg Oral Q12H    atorvastatin  40 mg Oral QHS    folic acid  1 mg Oral Daily    hydrALAZINE  100 mg Oral Q8H    mirtazapine  7.5 mg Oral QHS    pantoprazole  40 mg Oral Daily    venlafaxine  37.5 mg Oral Daily    vitamin D  1,000 Units Oral Daily    [START ON 6/15/2024] warfarin  2.5 mg Oral Daily     PRN Meds:  Current Facility-Administered Medications:     acetaminophen, 650 mg, Oral, Q6H PRN    dextrose 10%, 12.5 g, Intravenous, PRN    dextrose 10%, 25 g, Intravenous, PRN    glucagon (human recombinant), 1 mg, Intramuscular, PRN    glucagon (human recombinant), 1 mg,  Intramuscular, PRN    glucose, 16 g, Oral, PRN    glucose, 24 g, Oral, PRN    insulin aspart U-100, 0-5 Units, Subcutaneous, QID (AC + HS) PRN    magnesium sulfate IVPB, 2 g, Intravenous, PRN    prochlorperazine, 2.5 mg, Intravenous, Q6H PRN    simethicone, 1 tablet, Oral, TID PRN    traZODone, 25 mg, Oral, Nightly PRN    Review of patient's allergies indicates:  No Known Allergies  Objective:     Vital Signs (Most Recent):  Temp: 97.7 °F (36.5 °C) (06/14/24 0701)  Pulse: 77 (06/14/24 0746)  Resp: 19 (06/14/24 0746)  BP: (S) (!) 90/0 (06/14/24 0715)  SpO2: (!) 90 % (06/14/24 0724) Vital Signs (24h Range):  Temp:  [97 °F (36.1 °C)-97.9 °F (36.6 °C)] 97.7 °F (36.5 °C)  Pulse:  [71-91] 77  Resp:  [16-26] 19  SpO2:  [90 %-100 %] 90 %  BP: ()/(0-96) 90/0  Arterial Line BP: ()/(71-75) 93/71     Patient Vitals for the past 72 hrs (Last 3 readings):   Weight   06/14/24 0400 81.5 kg (179 lb 10.8 oz)   06/13/24 0400 80 kg (176 lb 5.9 oz)   06/12/24 0401 80.5 kg (177 lb 7.5 oz)     Body mass index is 23.71 kg/m².      Intake/Output Summary (Last 24 hours) at 6/14/2024 0833  Last data filed at 6/14/2024 0801  Gross per 24 hour   Intake 2756.98 ml   Output 2828 ml   Net -71.02 ml       Hemodynamic Parameters:       Telemetry: SR       Physical Exam  Constitutional:       Appearance: Normal appearance.   HENT:      Head: Normocephalic and atraumatic.   Eyes:      Conjunctiva/sclera: Conjunctivae normal.      Pupils: Pupils are equal, round, and reactive to light.   Neck:      Comments: RIJ trialysis cath. JVP at earlobe  Cardiovascular:      Rate and Rhythm: Normal rate and regular rhythm.      Comments: Smooth VAD hum  Pulmonary:      Effort: Pulmonary effort is normal.      Breath sounds: Normal breath sounds.   Abdominal:      General: Bowel sounds are normal.      Palpations: Abdomen is soft.   Musculoskeletal:         General: No swelling. Normal range of motion.      Cervical back: Normal range of motion and  neck supple.   Skin:     General: Skin is warm and dry.      Capillary Refill: Capillary refill takes 2 to 3 seconds.   Neurological:      General: No focal deficit present.      Mental Status: He is alert and oriented to person, place, and time.            Significant Labs:  CBC:  Recent Labs   Lab 06/12/24 2120 06/13/24 0349 06/14/24 0221   WBC 10.80 9.06 9.81   RBC 2.24* 2.27* 2.22*   HGB 6.5* 6.5* 6.2*   HCT 20.2* 20.4* 20.3*    298 312   MCV 90 90 91   MCH 29.0 28.6 27.9   MCHC 32.2 31.9* 30.5*     BNP:  Recent Labs   Lab 06/11/24  1933 06/14/24 0221   BNP 1,388* 1,542*     CMP:  Recent Labs   Lab 06/11/24  1605 06/11/24  2227 06/12/24  0345 06/12/24  1422 06/13/24  1540 06/13/24 2118 06/14/24 0221   GLU 84   < > 32*   < > 110 126*  126* 129*  129*  129*   CALCIUM 8.7   < > 8.9   < > 8.7 8.6*  8.6* 9.0  9.0  9.0   ALBUMIN 2.6*  --  2.5*   < > 2.3* 2.4*  2.4* 2.5*  2.5*  2.5*   PROT 7.6  --  7.5  --   --   --  7.6      < > 136   < > 130* 130*  130* 129*  129*  129*   K 4.1   < > 3.9   < > 4.5 4.4  4.4 4.7  4.7  4.7   CO2 20*   < > 20*   < > 19* 19*  19* 17*  17*  17*   CL 99   < > 99   < > 97 99  99 100  100  100   *   < > 114*   < > 74* 41*  41* 30*  30*  30*   CREATININE 9.9*   < > 9.5*   < > 7.0* 3.8*  3.8* 3.3*  3.3*  3.3*   ALKPHOS 142*  --  148*  --   --   --  165*   ALT 59*  --  67*  --   --   --  65*   AST 58*  --  73*  --   --   --  64*   BILITOT 0.2  --  0.2  --   --   --  0.4    < > = values in this interval not displayed.      Coagulation:   Recent Labs   Lab 06/12/24 0345 06/13/24 0349 06/14/24 0221   INR 2.4* 2.8* 3.2*   APTT 45.2* 48.4* 53.5*     LDH:  Recent Labs   Lab 06/12/24 0345 06/13/24 0349 06/14/24 0221   * 422* 506*     Microbiology:  Microbiology Results (last 7 days)       Procedure Component Value Units Date/Time    Blood culture #1 **CANNOT BE ORDERED STAT** [[id number]] Collected: 06/11/24 2122    Order Status:  Completed Specimen: Blood from Peripheral, Antecubital, Right Updated: 06/13/24 2213     Blood Culture, Routine No Growth to date      No Growth to date      No Growth to date    Blood culture #2 **CANNOT BE ORDERED STAT** [[id number]] Collected: 06/11/24 2121    Order Status: Completed Specimen: Blood from Peripheral, Hand, Left Updated: 06/13/24 2213     Blood Culture, Routine No Growth to date      No Growth to date      No Growth to date    Urine culture [[id number]] Collected: 06/12/24 1422    Order Status: Completed Specimen: Urine Updated: 06/13/24 2113     Urine Culture, Routine No growth    Narrative:      Specimen Source->Urine            I have reviewed all pertinent labs within the past 24 hours.    Estimated Creatinine Clearance: 26.2 mL/min (A) (based on SCr of 3.3 mg/dL (H)).    Diagnostic Results:  I have reviewed and interpreted all pertinent imaging results/findings within the past 24 hours.  Assessment and Plan:     Mr. Radha Abbott is a 63 yo male with stage D HFrEF, ICMP who underwent HM3 placement as DT (12/1/23) w/ a hospital stay complicated by vasoplegia(requiring Giaprezza), debility, malnutrition/impaired swallowing, and renal failure requiring HD (since weaned off) w/ recent discharge for ADHF presented to the ED with wife due to c/o worsening generalized weakness since 6/10. Per wife patient was mostly sleeping during the day despite sleeping 10 hrs at night. Patient also admits decreased appetite and decreased urine output despite taking his medications as prescribed. He has not urinated since 6/11 at 2pm. He has chronic shortness of breath on exertion. Denies confusion, syncope, diarrhea, constipation, N/V, dysuria, or other complaints. Patient had his prior tunnel cath removed used for HD about 2 weeks ago. He completed a 6 week therapy for Enterococcus bacteremia with ampicillin and rocephin ending 5/30/24a and was switched to PO antibiotics.     On prior hospital stay nephrology  consulted during hospital stay due to elevated renal function. They feel he is close to being a dialysis patient. He diuresed on IV Bumex with prn Metolazone. He is net negative 1.2L throughout his hospital stay and weight on day of discharge was 179lbs down from 186lbs on admit. We continued his home dose of Bumex 4mg BID but increased his prn Metolazone to 10mg. He received a dose of Epo while inpatient and he has plans to follow up with Heme/Onc to get outpatient therapy arranged.      2D Echo with CFD done on 3/26/2024  LVAD: There is a Heartmate III LVAD running at 5000 RPM. The aortic valve does not open. The ventricular septum is at midline. Inflow cannula well seated at the apex. Outflow graft not visualized.   Left Ventricle: The left ventricle is moderately dilated. Normal wall thickness. Global hypokinesis present. Septal motion is abnormal. There is severely reduced systolic function with a visually estimated ejection fraction of 5 - 10%. There is diastolic dysfunction but grade cannot be determined.   Right Ventricle: Mild right ventricular enlargement. Wall thickness is normal. Right ventricle wall motion is normal. Systolic function is normal. Pacemaker lead present in the ventricle.   Left Atrium: Left atrium is severely dilated.   Right Atrium: Right atrium is moderately dilated.   Mitral Valve: The mitral valve is repaired with an Noble stitch. There is mild regurgitation.   IVC/SVC: Normal venous pressure at 3 mmHg.              * Acute renal failure superimposed on stage 4 chronic kidney disease  Mr. 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 Admitted for worsening renal function with a BUN>100/Cr. 9.9.     Plan:   - CT Ab/pel: Kidneys/ Ureters: Normal in size and location.  Mild bilateral perinephric stranding, a nonspecific finding and similar  to prior.  No hydronephrosis or nephrolithiasis. No ureteral dilatation.  - Nephrology consulted, appreciate recs. Had 4 hour SLED run on 6/13, 8 hour SLED run overnight. Net even and CVP 23. Essentially anuric now (F/C removed overnight 2/2 penile discomfort, voided 30 cc since. Bladder scan this morning with 0 residual  - RIJ trialysis placed 6/12 for hemodynamics monitoring and HD  - Will need OP HD upon discharge, recommend CM/SW involvement to start workup.  - Will need IR evaluation once out of ICU for TDC placement.  - Renally dosing medications      Hypoglycemia  -Endocrine following. D10W gtt D/C'd    Bacteremia due to Enterococcus  He completed a 6 week therapy for Enterococcus bacteremia with ampicillin and rocephin ending 5/30/24a and was switched to Amoxicillin for suppression    Anticoagulant long-term use  Cont warfarin    Anemia  Chronic with no acute bleeding, multifactorial 2/2 CKD4 and chronic anticoagulation    -Hgb 6.2, normally in the 6's - 7's, likely dilutional  -Iron panel done. Iron deficient but Ferritin is elevated at 1186  -Continue to trend    Adjustment disorder with depressed mood  Cont SSRI    LVAD (left ventricular assist device) present  Procedure: Device Interrogation Including analysis of device parameters  Current Settings: Ventricular Assist Device  Review of device function is stable        6/14/2024    10:01 AM 6/14/2024     9:01 AM 6/14/2024     6:00 AM 6/14/2024     5:01 AM 6/14/2024     4:00 AM 6/14/2024     3:01 AM 6/14/2024     2:00 AM   TXP LVAD INTERROGATIONS   Type HeartMate3 HeartMate3 HeartMate3 HeartMate3 HeartMate3 HeartMate3 HeartMate3   Flow 3.8 4 4.3 4.1 4.2 4.2 4   Speed 5000 5000 5000 5000 5000 5000 5000   PI 6.3 5.3 3.4 3.9 4.2 4.2 4.5   Power ([place]) 3.3 3.3 3.4 3.4 3.4 3.3 3.4   LSL 4600 4600 4600 4600 4600 4600 4600   Pulsatility Intermittent pulse Intermittent pulse Intermittent pulse Intermittent pulse Intermittent pulse Intermittent pulse Intermittent  pulse         PAF (paroxysmal atrial fibrillation)  -Continue amiodarone and Coumadin      Type 2 diabetes mellitus without complication, without long-term current use of insulin  -Endocrine    Acute on chronic combined systolic and diastolic heart failure  Owing to his advanced kidney disease and significantly elevated creat, he is not on a  ACEI/ARB/ARNI or MRA. He is also not on a beta blocker due to RV dysfunction.     Plan:  - See LVAD  - Se IVÁN    CAD (coronary artery disease)  -Continue statin      Uninterrupted Critical Care/Counseling Time (not including procedures): [place] [id number]  Heart Transplant  [facility] - Cardiac Intensive [facility]

## 2024-06-14 NOTE — PROGRESS NOTES
[facility] - Cardiac Intensive Care  Nephrology  Progress Note    Patient Name: Radha Abbott  MRN: [id number]  Admission Date: 6/11/2024  Hospital Length of Stay: 3 days  Attending Provider: Sajan Hurley, *   Primary Care Physician: Vasu Kong MD  Principal Problem:Acute renal failure superimposed on stage 4 chronic kidney disease    Subjective:     HPI: Mr. Abbott is a 64-year-old man with ischemic cardiomyopathy with most recent TTE showing EF 10 -15% and G3DD s/p [employer] ICM placement previously on chronic dobutamine infusion however now with Heartmate III LVAD placed on 12/01/2023, CAD s/p x3 vessel CABG back in 2009, type 2 diabetes mellitus (most recent hemoglobin A1c 7.4%), hypertension, HLD, history of ventricular fibrillation for which he is on amiodarone, chronic AC with Coumadin, advanced CKD V (baseline creatinine ~6) with recurrent AKIs and previous dialysis dependence (tunneled HD catheter removed in early March of this year). He had a recent hospitalization where he was treated for fluid overload acute on chronic CHF exacerbation and IVÁN thought to be 2/2 type 2 CRS and was subsequently diuresed and discharged with a creatinine of 6.2. He presents to the hospital on 6/11 for concerns of worsening fatigue that started one day prior to him arriving to the hospital, and has had difficulties getting out of bed. He has also noted a decrease in his urine output over the past 36 hours despite Diuretic use. He admits adherence to his Metolazone and Bumex at home, however has not passed any urine in the past 10 hours. Bed side bladder scan revealed 200 mL of urine in his bladder. Upon arrival to the ER, his is hemodynamically stable, Labs showed Hgb of 6.3, electrolytes within acceptable limits, Serum CO2 of 21, BUN of 118, and a creatinine of 9.8. He denies feeling fluid overloaded or short of breath, reports good appetite, denies nausea, vomiting, or metallic taste in his mouth. Chest xray  showed mild central vascular congestion and interstitial pulmonary opacities which appear improved compared to previous studies. CT abdomen/Pelvis without contrast revealed Kidneys that were reported as normal in size and location, and without hydronephrosis or ureteral dilation.     Interval History:   8 hour SLED completed overnight, net even over the past 24 hours.  CVP > 20.    Electrolytes stable.  Minimal UOP.  Seen at bedside, moved from bedside chair and developed SOB and CP.  Hypervolemic on exam.    Review of patient's allergies indicates:  No Known Allergies  Current Facility-Administered Medications   Medication Frequency    0.9%  NaCl infusion (CRRT USE ONLY) Continuous    0.9%  NaCl infusion (CRRT USE ONLY) Continuous    0.9%  NaCl infusion Continuous    acetaminophen tablet 650 mg Q6H PRN    amiodarone tablet 400 mg Daily    amLODIPine tablet 5 mg Daily    amoxicillin capsule 500 mg Q12H    atorvastatin tablet 40 mg QHS    bumetanide (BUMEX) 25 mg in 100 mL infusion (conc: 0.25 mg/mL) Continuous    dextrose 10% bolus 125 mL 125 mL PRN    dextrose 10% bolus 250 mL 250 mL PRN    folic acid tablet 1 mg Daily    glucagon (human recombinant) injection 1 mg PRN    glucagon (human recombinant) injection 1 mg PRN    glucose chewable tablet 16 g PRN    glucose chewable tablet 24 g PRN    hydrALAZINE tablet 100 mg Q8H    insulin aspart U-100 pen 0-5 Units QID (AC + HS) PRN    magnesium sulfate 2g in water 50mL IVPB (premix) PRN    mirtazapine tablet 7.5 mg QHS    oxyCODONE immediate release tablet 5 mg Once    pantoprazole EC tablet 40 mg Daily    prochlorperazine injection Soln 2.5 mg Q6H PRN    simethicone chewable tablet 80 mg TID PRN    trazodone split tablet 25 mg Nightly PRN    venlafaxine tablet 37.5 mg Daily    vitamin D 1000 units tablet 1,000 Units Daily    [START ON 6/15/2024] warfarin (COUMADIN) tablet 2.5 mg Daily       Objective:     Vital Signs (Most Recent):  Temp: 97.8 °F (36.6 °C) (06/14/24  1101)  Pulse: 72 (06/14/24 1528)  Resp: (!) 22 (06/14/24 1528)  BP: (!) 88/0 (06/14/24 1401)  SpO2: 97 % (06/14/24 1501) Vital Signs (24h Range):  Temp:  [97 °F (36.1 °C)-97.9 °F (36.6 °C)] 97.8 °F (36.6 °C)  Pulse:  [71-91] 72  Resp:  [17-29] 22  SpO2:  [90 %-100 %] 97 %  BP: ()/(0-96) 88/0     Weight: 81.5 kg (179 lb 10.8 oz) (06/14/24 0400)  Body mass index is 23.71 kg/m².  Body surface area is 2.05 meters squared.    I/O last 3 completed shifts:  In: 4372.6 [P.O.:150; I.V.:4222.6]  Out: 3869 [Urine:500; Other:3369]     Physical Exam  Vitals and nursing note reviewed.   Constitutional:       General: He is not in acute distress.     Appearance: Normal appearance. He is not ill-appearing or toxic-appearing.   HENT:      Head: Normocephalic and atraumatic.      Right Ear: External ear normal.      Left Ear: External ear normal.      Nose: Nose normal.      Mouth/Throat:      Mouth: Mucous membranes are moist.   Eyes:      Extraocular Movements: Extraocular movements intact.   Cardiovascular:      Rate and Rhythm: Normal rate and regular rhythm.      Pulses: Normal pulses.      Heart sounds: Normal heart sounds. No murmur heard.  Pulmonary:      Effort: Pulmonary effort is normal. No respiratory distress.      Breath sounds: Normal breath sounds. No wheezing or rales.   Chest:      Chest wall: No tenderness.   Abdominal:      General: Abdomen is flat. There is distension.      Palpations: Abdomen is soft.   Musculoskeletal:         General: No swelling or deformity. Normal range of motion.      Cervical back: Normal range of motion and neck supple. No rigidity or tenderness.      Right lower leg: No edema.      Left lower leg: No edema.   Skin:     General: Skin is warm and dry.      Capillary Refill: Capillary refill takes 2 to 3 seconds.   Neurological:      General: No focal deficit present.      Mental Status: He is alert and oriented to person, place, and time.   Psychiatric:         Mood and Affect: Mood  normal.         Behavior: Behavior normal.         Thought Content: Thought content normal.         Judgment: Judgment normal.          Significant Labs:  CBC:   Recent Labs   Lab 06/14/24 0221   WBC 9.81   RBC 2.22*   HGB 6.2*   HCT 20.3*      MCV 91   MCH 27.9   MCHC 30.5*     CMP:   Recent Labs   Lab 06/14/24 0221 06/14/24  1313   *  129*  129* 117*  117*  117*   CALCIUM 9.0  9.0  9.0 8.9  8.9  8.9   ALBUMIN 2.5*  2.5*  2.5* 2.4*  2.4*  2.4*   PROT 7.6  --    *  129*  129* 129*  129*  129*   K 4.7  4.7  4.7 4.6  4.6  4.6   CO2 17*  17*  17* 18*  18*  18*     100  100 99  99  99   BUN 30*  30*  30* 41*  41*  41*   CREATININE 3.3*  3.3*  3.3* 4.0*  4.0*  4.0*   ALKPHOS 165*  --    ALT 65*  --    AST 64*  --    BILITOT 0.4  --         Assessment/Plan:     Cardiac/Vascular  LVAD (left ventricular assist device) present  -HTS following.    Renal/  * Acute renal failure superimposed on stage 4 chronic kidney disease  Patient with a baseline eGFR that appears to be variable however was noted to be in the 15-20 range in February before his recent IVÁN in May, presents now with fatigue and worsening IVÁN with a creatinine of 9.8 and eGFR of 5.5. BUN was noted to be elevated at 117, other electrolytes were normal. He was noted to have some asterixis on exam (which he states is chronic for him and unchanged from his baseline), however denies any other signs or symptoms of uremia. Bedside bladder scan revealed 200 mL of urine in his bladder and his last bladder void was 10 hours prior to this consult.     SLED initiated 6/13 x4 hours.  Mental status improved.  6/14:  8 hour SLED overnight, net even.    Recommendations  -Will plan to restart SLED today x 12 hours  -net -450 cc/hr as tolerated;  Goal to be negative 1-2L/24h if able to tolerate without pressor support  -recommend PRBC transfusion with SLED  -electrolytes will be adjusted to current  electrolytes  -Agree with current diuresis medications  -Avoid nephrotoxic agents (IV contrast, NSAID's, gadolinium contrast, PPI's) if able.   -Strict I's and O's  -RFP q 8 hours while on RRT.  PRN mg/phos replacement  -Renally dose all medications  -will need OP HD upon discharge, recommend CM/SW involvement to start workup.  -will need IR evaluation once out of ICU for TDC placement.      Norm Vidal NP  Nephrology  [facility] - Cardiac Intensive [facility]

## 2024-06-14 NOTE — PLAN OF CARE
OT eval complete. OT POC and goals established.   Problem: Occupational Therapy  Goal: Occupational Therapy Goal  Description: Goals to be met by: 7/14/24     Patient will increase functional independence with ADLs by performing:    UE Dressing with Set-up Assistance.  LE Dressing with Minimal Assistance.  Grooming while standing at sink with Stand-by Assistance.  Toileting from toilet/bedside commode with Minimal Assistance for hygiene and clothing management.   Supine to sit with Stand-by Assistance.  Step transfer with Contact Guard Assistance  Toilet transfer to toilet/bedside commode with Contact Guard Assistance.    Outcome: Progressing

## 2024-06-14 NOTE — ASSESSMENT & PLAN NOTE
Mr. Radha Abbott is a 65 yo male with stage D HFrEF, ICMP who underwent HM3 placement as DT, DM2, CKD4, HTN, E.faecalis bacteremia who presented due to c/o worsening generalized weakness since 6/10. Admitted for worsening renal function with a BUN>100/Cr. 9.9.     Plan:   - CT Ab/pel: Kidneys/ Ureters: Normal in size and location.  Mild bilateral perinephric stranding, a nonspecific finding and similar to prior.  No hydronephrosis or nephrolithiasis. No ureteral dilatation.  - Nephrology consulted, appreciate recs. Had 4 hour SLED run on 6/13, 8 hour SLED run overnight. Net even and CVP 23. Essentially anuric now (F/C removed overnight 2/2 penile discomfort, voided 30 cc since. Bladder scan this morning with 0 residual  - RIJ trialysis placed 6/12 for hemodynamics monitoring and HD  - Will need OP HD upon discharge, recommend CM/SW involvement to start workup.  - Will need IR evaluation once out of ICU for TDC placement.  - Renally dosing medications

## 2024-06-14 NOTE — PT/OT/SLP EVAL
Occupational Therapy   Co-Evaluation  Co-treatment performed due to patient's multiple deficits requiring two skilled therapists to appropriately and safely assess patient's strength and endurance while facilitating functional tasks in addition to accommodating for patient's activity tolerance.      Name: Radha Abbott  MRN: [id number]  Admitting Diagnosis: Acute renal failure superimposed on stage 4 chronic kidney disease  Recent Surgery: * No surgery found *      Recommendations:     Discharge Recommendations: High Intensity Therapy  Discharge Equipment Recommendations:  to be determined by next level of care  Barriers to discharge:  None    Assessment:     Radha Abbott is a 64 y.o. male with a medical diagnosis of Acute renal failure superimposed on stage 4 chronic kidney disease.  He presents with the following performance deficits affecting function: weakness, impaired endurance, impaired self care skills, impaired functional mobility, gait instability, impaired balance, decreased upper extremity function, decreased lower extremity function, decreased safety awareness, impaired cardiopulmonary response to activity, pain, decreased coordination.  Pt s/p LVAD implantation 12/2023. Pt and spouse report a gradual decline in function ~2 weeks PTA and requiring increased assist with ADLs and functional mobility. Pt was primarily limited by generalized weakness and decreased activity tolerance while transferring EOB>bedside chair. Pt is currently limited in ADLs, functional mobility, and functional transfers and is not performing tasks at OF. Pt would continue to benefit from skilled OT services to maximize functional independence with ADLs and functional mobility, reduce caregiver burden, and facilitate safe discharge in the least restrictive environment.     Patient has demonstrated sufficient progression to warrant high intensity therapy evidenced by objectives noted below.     Rehab Prognosis: Good; patient would  benefit from acute skilled OT services to address these deficits and reach maximum level of function.       Plan:     Patient to be seen 4 x/week to address the above listed problems via self-care/home management, therapeutic activities, therapeutic exercises, neuromuscular re-education  Plan of Care Expires: 07/14/24  Plan of Care Reviewed with: patient, spouse    Subjective     Chief Complaint: generalized weakness  Patient/Family Comments/goals: to return to PLOF    Occupational Profile:  Living Environment: Pt lives with spouse in a [facility] with threshold. W-I-S with built in bench and grab bars  Previous level of function: (A) required for ADLs (including dressing, bathing, toileting) and functional mobility using RW; prior to decline ~2 weeks prior, he was Mod (I) and able to ambulate HH distance w/o assist  Roles and Routines: enjoys spending time with family  Equipment Used at Home: bedside commode, grab bar, rollator, shower chair  Assistance upon Discharge: family    Pain/Comfort:  Pain Rating 1: 0/10  Pain Rating Post-Intervention 1: 0/10    Patients cultural, spiritual, Evangelical conflicts given the current situation: no    Objective:   Additional staff present:  Shannon, PT    Communicated with: RN prior to session.  Patient found HOB elevated with arterial line, blood pressure cuff, LVAD, peripheral IV, pulse ox (continuous), telemetry, oxygen, Trialysis upon OT entry to room.    General Precautions: Standard, fall, LVAD  Orthopedic Precautions: N/A  Braces: N/A  Respiratory Status: Nasal cannula, flow 2 L/min    Occupational Performance:    Bed Mobility:    Patient completed Scooting/Bridging with maximal assistance  Patient completed Supine to Sit with moderate assistance and 2 persons    Functional Mobility/Transfers:  Patient completed Sit <> Stand Transfer with minimum assistance and of 2 persons  with  rolling walker   Patient completed Bed <> Chair Transfer using Step Transfer technique with  moderate assistance and of 2 persons with rolling walker  Functional Mobility: Pt ambulated 3 ft EOB>bedside chair to the left with Min x2 regressing to Mod x2 when turning with RW  No LOB  SOB noted    Activities of Daily Living:  Lower Body Dressing: total assistance to don socks/shoes at bed level; max (A) to thread pants onto b/l LE and manage above hips in standing    Cognitive/Visual Perceptual:  Cognitive/Psychosocial Skills:     -       Oriented to: Person, Place, Time, and Situation   -       Follows Commands/attention:Follows two-step commands  -       Safety awareness/insight to disability: impaired   -       Mood/Affect/Coping skills/emotional control: Cooperative    Physical Exam:  Sensation:    -       Intact  Upper Extremity Range of Motion:     -       Right Upper Extremity: WFL  -       Left Upper Extremity: WFL  Upper Extremity Strength:    -       Right Upper Extremity: grossly 3+/5  -       Left Upper Extremity: grossly 3+/5   Strength:    -       Right Upper Extremity: decreased  -       Left Upper Extremity: decreased (dominant hand)    AMPAC 6 Click ADL:  AMPAC Total Score: 14    Treatment & Education:  -Education on energy conservation and task modification to maximize safety and (I) during ADLs and mobility  -Education on importance of OOB activity to improve overall activity tolerance and promote recovery  -Pt educated to call for assistance and to transfer with hospital staff only  -Provided education regarding role of OT, POC, & discharge recommendations with pt and spouse verbalizing understanding.  Pt had no further questions & when asked whether there were any concerns pt reported none.     Patient left up in chair with all lines intact, call button in reach, and RN and spouse present    GOALS:   Multidisciplinary Problems       Occupational Therapy Goals          Problem: Occupational Therapy    Goal Priority Disciplines Outcome Interventions   Occupational Therapy Goal     OT,  PT/OT Progressing    Description: Goals to be met by: 7/14/24     Patient will increase functional independence with ADLs by performing:    UE Dressing with Set-up Assistance.  LE Dressing with Minimal Assistance.  Grooming while standing at sink with Stand-by Assistance.  Toileting from toilet/bedside commode with Minimal Assistance for hygiene and clothing management.   Supine to sit with Stand-by Assistance.  Step transfer with Contact Guard Assistance  Toilet transfer to toilet/bedside commode with Contact Guard Assistance.                         History:     Past Medical History:   Diagnosis Date    CAD (coronary artery disease)     CHF (congestive heart failure)     Diabetes mellitus     HFrEF (heart failure with reduced ejection fraction)     ICD (implantable cardioverter-defibrillator) in place     MI, old     Type 2 diabetes mellitus without complication, without long-term current use of insulin 10/07/2023         Past Surgical History:   Procedure Laterality Date    ANGIOPLASTY-VENOUS ARTERY Right 12/1/2023    Procedure: ANGIOPLASTY-VENOUS ARTERY, RIGHT FEMORAL;  Surgeon: Yuri Washington MD;  [facility];  Service: Cardiovascular;  Laterality: Right;    AORTIC VALVULOPLASTY N/A 12/1/2023    Procedure: REPAIR, AORTIC VALVE;  Surgeon: Yuri Washington MD;  [facility];  Service: Cardiovascular;  Laterality: N/A;    CARDIAC SURGERY      CLOSURE N/A 12/1/2023    Procedure: CLOSURE, TEMPORARY;  Surgeon: Yuri Washington MD;  [facility]: [facility];  Service: Cardiovascular;  Laterality: N/A;    DRAINAGE OF PLEURAL EFFUSION  12/4/2023    Procedure: DRAINAGE, PLEURAL EFFUSION;  Surgeon: Yuri Washington MD;  [facility];  Service: Cardiovascular;;    INSERTION OF GRAFT TO PERICARDIUM  12/4/2023    Procedure: INSERTION, GRAFT, PERICARDIUM;  Surgeon: Yuri Washington MD;  [facility];  Service: Cardiovascular;;    INSERTION OF INTRA-AORTIC BALLOON ASSIST  DEVICE Right 11/21/2023    Procedure: INSERTION, INTRA-AORTIC BALLOON PUMP;  Surgeon: Finn Cohn MD;  Location: [facility] CATH LAB;  Service: Cardiology;  Laterality: Right;    LEFT VENTRICULAR ASSIST DEVICE Left 12/1/2023    Procedure: INSERTION-LEFT VENTRICULAR ASSIST DEVICE;  Surgeon: Yuri Washington MD;  Location: [facility] OR [facility];  Service: Cardiovascular;  Laterality: Left;  REDO STERNOTOMY - REDO SAW NEEDED FOR CASE    LYSIS OF ADHESIONS  12/1/2023    Procedure: LYSIS, ADHESIONS;  Surgeon: Yuri Washington MD;  Location: [facility] OR [facility]LR;  Service: Cardiovascular;;    PLACEMENT OF SWAN ROLANDO CATHETER WITH IMAGING GUIDANCE  11/20/2023    Procedure: INSERTION, CATHETER, SWAN-ROLANDO, WITH IMAGING GUIDANCE;  Surgeon: Sajan Hurley MD;  Location: [facility] CATH LAB;  Service: Cardiology;;    REMOVAL OF TUNNELED CENTRAL VENOUS CATHETER (CVC) N/A 3/1/2024    Procedure: REMOVAL, CATHETER, CENTRAL VENOUS, TUNNELED;  Surgeon: Seble Aguilar MD;  Location: [facility] CATH LAB;  Service: Interventional Nephrology;  Laterality: N/A;    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 12/1/2023    Procedure: REPAIR, ANEURYSM, ARTERY, FEMORAL;  Surgeon: Yuri Washington MD;  [facility]  Service: Cardiovascular;  Laterality: Right;  Right Femoral Artery Repair    RIGHT HEART CATHETERIZATION Right 10/10/2023    Procedure: INSERTION, CATHETER, RIGHT HEART;  Surgeon: Bin Gandhi MD;  Location: [facility] CATH LAB;  Service: Cardiology;  Laterality: Right;    RIGHT HEART CATHETERIZATION Right 10/13/2023    Procedure: INSERTION, CATHETER, RIGHT HEART;  Surgeon: Walter Mcintyre MD;  Location: [facility] CATH LAB;  Service: Cardiology;  Laterality: Right;    RIGHT HEART CATHETERIZATION  11/13/2023    RIGHT HEART CATHETERIZATION Right 11/13/2023    Procedure: INSERTION, CATHETER, RIGHT HEART;  Surgeon: Juventino Bermudez Jr., MD;  Location: [facility] CATH LAB;  Service: Cardiology;  Laterality: Right;    RIGHT HEART CATHETERIZATION  Right 11/20/2023    Procedure: INSERTION, CATHETER, RIGHT HEART;  Surgeon: Sajan Hurley MD;  Location: [facility] CATH LAB;  Service: Cardiology;  Laterality: Right;    RIGHT HEART CATHETERIZATION Right 1/22/2024    Procedure: INSERTION, CATHETER, RIGHT HEART;  Surgeon: Brayan Ocampo MD;  Location: [facility] CATH LAB;  Service: Cardiology;  Laterality: Right;    STERNAL WOUND CLOSURE N/A 12/4/2023    Procedure: CLOSURE, WOUND, STERNUM;  Surgeon: Yuri Washington MD;  Location: [facility] OR [address] [place] [address];  Service: Cardiovascular;  Laterality: N/A;    STERNOTOMY N/A 12/1/2023    Procedure: STERNOTOMY, REDO;  Surgeon: Yuri Washington MD;  Location: [facility] OR [address] [place] [address];  Service: Cardiovascular;  Laterality: N/A;    VALVULOPLASTY, MITRAL VALVE N/A 12/1/2023    Procedure: VALVULOPLASTY, MITRAL VALVE;  Surgeon: Yuri Washington MD;  Location: [facility] OR [address] [place] [address];  Service: Cardiovascular;  Laterality: N/A;       Time Tracking:     OT Date of Treatment: 06/14/24  OT Start Time: 1022  OT Stop Time: 1054  OT Total Time (min): 32 min    Billable Minutes:Evaluation 9  Self Care/Home Management 8  Therapeutic Activity 15    6/14/2024

## 2024-06-14 NOTE — PROGRESS NOTES
06/14/2024  Jonathan ANAMARIA Ho Jr    Current provider:  Sajan Hurley, *    Device interrogation:      6/14/2024     6:00 AM 6/14/2024     5:01 AM 6/14/2024     4:00 AM 6/14/2024     3:01 AM 6/14/2024     2:00 AM 6/14/2024     1:01 AM 6/14/2024    12:00 AM   TXP LVAD INTERROGATIONS   Type HeartMate3 HeartMate3 HeartMate3 HeartMate3 HeartMate3 HeartMate3 HeartMate3   Flow 4.3 4.1 4.2 4.2 4 4.1 4.2   Speed 5000 5000 5000 5000 5000 5000 5000   PI 3.4 3.9 4.2 4.2 4.5 4.3 3.6   Power ([place]) 3.4 3.4 3.4 3.3 3.4 3.4 3.4   LSL 4600 4600 4600 4600 4600 4600 4600   Pulsatility Intermittent pulse Intermittent pulse Intermittent pulse Intermittent pulse Intermittent pulse Intermittent pulse Intermittent pulse          Rounded on [facility] to ensure all mechanical assist device settings (IABP or VAD) were appropriate and all parameters were within limits.  I was able to ensure all back up equipment was present, the staff had no issues, and the Perfusion Department daily rounding was complete.      For implantable VADs: Interrogation of Ventricular assist device was performed with analysis of device parameters and review of device function. I have personally reviewed the interrogation findings and agree with findings as stated.     In emergency, the nursing units have been notified to contact the perfusion department either by:  Calling [phone] from 630am to 4pm Mon thru Fri, utilizing the On-Call Finder functionality of Epic and searching for Perfusion, or by contacting the hospital  from 4pm to 630am and on weekends and asking to speak with the perfusionist on call.    8:23 AM

## 2024-06-14 NOTE — PLAN OF CARE
Problem: Adult Inpatient Plan of Care  Goal: Plan of Care Review  Outcome: Progressing  Goal: Patient-Specific Goal (Individualized)  Outcome: Progressing  Goal: Optimal Comfort and Wellbeing  Outcome: Progressing   Cardiac ICU Care Plan    POC reviewed with Radha Abbott and family. Questions and concerns addressed.  See below and flowsheets for full assessment and VS info.   Cvp 23 21 24  Svo2 51    2 episodes of CP today. MD aware. Medication given for pain. See prior note. CRRT SLED for  12 hours goal 450. Pt currently at 400. 1 emesis episode for today. Elevated dopplers >90. Norvasc given. No Vad alarms.    Neuro:  Michelle Coma Scale  Best Eye Response: 3-->(E3) to speech  Best Motor Response: 6-->(M6) obeys commands  Best Verbal Response: 5-->(V5) oriented  Michelle Coma Scale Score: 14  Assessment Qualifiers: patient not sedated/intubated  Pupil PERRLA: yes    24 hr Temp:  [97 °F (36.1 °C)-97.9 °F (36.6 °C)]      CV:  Rhythm: normal sinus rhythm   DVT prophylaxis: VTE Required Core Measure: Pharmacological prophylaxis initiated/maintained    CVP (mean): (S) 24 mmHg (06/14/24 1501)       SVO2 (%): 45 % (06/13/24 1901)       Type: HeartMate3       Pulses  Right Radial Pulse: 1+ (weak)  Left Radial Pulse: 1+ (weak)  Right Dorsalis Pedis Pulse: 1+ (weak)  Left Dorsalis Pedis Pulse: 1+ (weak)  Right Posterior Tibial Pulse: 1+ (weak)  Left Posterior Tibial Pulse: 1+ (weak)    Resp:  Flow (L/min) (Oxygen Therapy): 3       GI/:  GI prophylaxis: yes  Diet/Nutrition Received: restrict fluids, supplemental drink  Last Bowel Movement: 06/14/24  Voiding Characteristics: anuria, hesitancy  [REMOVED]      Urethral Catheter 06/12/24 0300 16 Fr.-Reason for Continuing Urinary Catheterization: Critically ill in ICU and requiring hourly monitoring of intake/output   Intake/Output Summary (Last 24 hours) at 6/14/2024 1728  Last data filed at 6/14/2024 1701  Gross per 24 hour   Intake 2922.52 ml   Output 2598 ml   Net 324.52 ml      Treatment Type: Slow low efficiency dialysis  UF Rate: 200 cc/hr  Nutritional Supplement Intake: Quantity 1/2, Type: Novasource    Labs/Accuchecks:  Recent Labs   Lab 06/12/24 2120 06/13/24 0349 06/14/24 0221   WBC 10.80 9.06 9.81   RBC 2.24* 2.27* 2.22*   HGB 6.5* 6.5* 6.2*   HCT 20.2* 20.4* 20.3*    298 312      Recent Labs   Lab 06/12/24 0345 06/13/24 0349 06/14/24 0221   INR 2.4* 2.8* 3.2*   APTT 45.2* 48.4* 53.5*      Recent Labs     06/14/24 0221 06/14/24  1313   *  129*  129* 129*  129*  129*   K 4.7  4.7  4.7 4.6  4.6  4.6   CO2 17*  17*  17* 18*  18*  18*     100  100 99  99  99   BUN 30*  30*  30* 41*  41*  41*   CREATININE 3.3*  3.3*  3.3* 4.0*  4.0*  4.0*   ALKPHOS 165*  --    ALT 65*  --    AST 64*  --    BILITOT 0.4  --        Recent Labs   Lab 06/11/24  1933   TROPONINI 0.051*      Recent Labs     06/13/24 2036 06/14/24  0729 06/14/24  0747   PH 7.320* 7.345* 7.324*   PCO2 42.5 35.8 37.5   PO2 27* 23* 29*   HCO3 21.9* 19.6* 19.5*   POCSATURATED 45 37 51   BE -4* -6* -6*       Electrolytes: N/A - electrolytes WDL  Accuchecks: ACHS    Gtts/LDAs:   sodium chloride 0.9%   Intravenous Continuous 200 mL/hr at 06/14/24 1701 Rate Verify at 06/14/24 1701    sodium chloride 0.9%   Intravenous Continuous   Stopped at 06/13/24 0117    bumetanide (BUMEX) 25 mg in 100 mL infusion (conc: 0.25 mg/mL)  1 mg/hr Intravenous Continuous 4 mL/hr at 06/14/24 1701 1 mg/hr at 06/14/24 1701       Lines/Drains/Airways       Central Venous Catheter Line  Duration             Trialysis (Dialysis) Catheter 06/12/24 0124 right internal jugular 2 days              Line  Duration                  VAD 12/01/23 1015 Left ventricular assist device HeartMate 3 196 days              Peripheral Intravenous Line  Duration                  Midline Catheter - Single Lumen 06/13/24 1623 Left basilic vein (medial side of arm) 20g x 10cm 1 day                    Skin/Wounds  Bathing/Skin  Care: bath, complete;dressed/undressed;electrode patches/site rotation;linen changed (06/13/24 1901)  Wounds: No  Wound care consulted: No

## 2024-06-14 NOTE — NURSING
Nurses Note -- 4 Eyes      6/13/2024   7:35 AM      Skin assessed during: Daily Assessment      [x] No Altered Skin Integrity Present    []Prevention Measures Documented      [] Yes- Altered Skin Integrity Present or Discovered   [] LDA Added if Not in Epic (Describe Wound)   [] New Altered Skin Integrity was Present on Admit and Documented in LDA   [] Wound Image Taken    Wound Care Consulted? No    Attending Nurse: KYAW Goncalves     Second RN/Staff Member:  Kelly Cummins RN

## 2024-06-14 NOTE — PLAN OF CARE
Cardiac ICU Care Plan    Pt completed 8hr SLED w/o complication. Although he was drowsy for first half of shift, he became restless and was unable to sleep from midnight on.     POC reviewed with Radha Abbott and family. Questions and concerns addressed. Pt progressing toward goals. Will continue to monitor. See below and flowsheets for full assessment and VS info.       Neuro:  [place] Coma Scale  Best Eye Response: 4-->(E4) spontaneous  Best Motor Response: 6-->(M6) obeys commands  Best Verbal Response: 5-->(V5) oriented  Michelle Coma Scale Score: 15  Assessment Qualifiers: patient not sedated/intubated, no eye obstruction present  Pupil PERRLA: yes    24 hr Temp:  [97 °F (36.1 °C)-97.9 °F (36.6 °C)]      CV:  Rhythm: normal sinus rhythm   DVT prophylaxis: VTE Required Core Measure: Pharmacological prophylaxis initiated/maintained    CVP (mean): 18 mmHg (06/14/24 0301)       SVO2 (%): 45 % (06/13/24 1901)       Type: HeartMate3       Pulses  Right Radial Pulse: 1+ (weak)  Left Radial Pulse: 1+ (weak)  Right Dorsalis Pedis Pulse: 1+ (weak)  Left Dorsalis Pedis Pulse: 1+ (weak)  Right Posterior Tibial Pulse: 1+ (weak)  Left Posterior Tibial Pulse: 1+ (weak)    Resp:  Flow (L/min) (Oxygen Therapy): 2       GI/:  GI prophylaxis: yes  Diet/Nutrition Received: restrict fluids  Last Bowel Movement: 06/14/24  Voiding Characteristics: patient on CRRT, anuria   Intake/Output Summary (Last 24 hours) at 6/14/2024 0634  Last data filed at 6/14/2024 0600  Gross per 24 hour   Intake 2882.31 ml   Output 2878 ml   Net 4.31 ml     Treatment Type: Slow low efficiency dialysis  UF Rate: 450 cc/hr  Nutritional Supplement Intake: Quantity 0, Type:  none    Labs/Accuchecks:  Recent Labs   Lab 06/12/24  2120 06/13/24  0349 06/14/24  0221   WBC 10.80 9.06 9.81   RBC 2.24* 2.27* 2.22*   HGB 6.5* 6.5* 6.2*   HCT 20.2* 20.4* 20.3*    298 312      Recent Labs   Lab 06/12/24  0345 06/13/24  0349 06/14/24  0221   INR 2.4* 2.8* 3.2*    APTT 45.2* 48.4* 53.5*      Recent Labs     06/14/24  0221   *  129*  129*   K 4.7  4.7  4.7   CO2 17*  17*  17*     100  100   BUN 30*  30*  30*   CREATININE 3.3*  3.3*  3.3*   ALKPHOS 165*   ALT 65*   AST 64*   BILITOT 0.4       Recent Labs   Lab 06/11/24  1933   TROPONINI 0.051*      Recent Labs     06/12/24  2151 06/13/24  0757 06/13/24  2036   PH 7.338* 7.333* 7.320*   PCO2 44.6 42.4 42.5   PO2 27* 34* 27*   HCO3 23.9* 22.5* 21.9*   POCSATURATED 46 62 45   BE -2 -3* -4*       Electrolytes: Electrolytes replaced  Accuchecks: Q4H    Gtts/LDAs:   sodium chloride 0.9%   Intravenous Continuous   Stopped at 06/14/24 0104    sodium chloride 0.9%   Intravenous Continuous   Stopped at 06/13/24 0117    bumetanide (BUMEX) 25 mg in 100 mL infusion (conc: 0.25 mg/mL)  1 mg/hr Intravenous Continuous 4 mL/hr at 06/14/24 0600 1 mg/hr at 06/14/24 0600    dextrose 10 % in water (D10W)   Intravenous Continuous 25 mL/hr at 06/14/24 0600 Rate Verify at 06/14/24 0600       Lines/Drains/Airways       Central Venous Catheter Line  Duration             Trialysis (Dialysis) Catheter 06/12/24 0124 right internal jugular 2 days              Drain  Duration                  Urethral Catheter 06/12/24 0300 16 Fr. 2 days              Line  Duration                  VAD 12/01/23 1015 Left ventricular assist device HeartMate 3 195 days              Peripheral Intravenous Line  Duration                  Midline Catheter - Single Lumen 06/13/24 1623 Left basilic vein (medial side of arm) 20g x 10cm <1 day                    Skin/Wounds  Bathing/Skin Care: bath, complete;dressed/undressed;electrode patches/site rotation;linen changed (06/13/24 1901)  Wounds: Yes  Wound care consulted: No     Problem: Adult Inpatient Plan of Care  Goal: Plan of Care Review  Outcome: Progressing

## 2024-06-14 NOTE — ASSESSMENT & PLAN NOTE
Procedure: Device Interrogation Including analysis of device parameters  Current Settings: Ventricular Assist Device  Review of device function is stable        6/14/2024    10:01 AM 6/14/2024     9:01 AM 6/14/2024     6:00 AM 6/14/2024     5:01 AM 6/14/2024     4:00 AM 6/14/2024     3:01 AM 6/14/2024     2:00 AM   TXP LVAD INTERROGATIONS   Type HeartMate3 HeartMate3 HeartMate3 HeartMate3 HeartMate3 HeartMate3 HeartMate3   Flow 3.8 4 4.3 4.1 4.2 4.2 4   Speed 5000 5000 5000 5000 5000 5000 5000   PI 6.3 5.3 3.4 3.9 4.2 4.2 4.5   Power ([place]) 3.3 3.3 3.4 3.4 3.4 3.3 3.4   LSL 4600 4600 4600 4600 4600 4600 4600   Pulsatility Intermittent pulse Intermittent pulse Intermittent pulse Intermittent pulse Intermittent pulse Intermittent pulse Intermittent pulse

## 2024-06-14 NOTE — ASSESSMENT & PLAN NOTE
Owing to his advanced kidney disease and significantly elevated creat, he is not on a  ACEI/ARB/ARNI or MRA. He is also not on a beta blocker due to RV dysfunction.     Plan:  - See LVAD  - Se IVÁN

## 2024-06-14 NOTE — PROGRESS NOTES
[facility] - Cardiac Intensive Care  Endocrinology  Progress Note    Admit Date: 6/11/2024     Reason for Consult: Management of T2DM, Hyperglycemia     Surgical Procedure and Date: HM3 placement as DT (12/1/23)     Diabetes diagnosis year: several years ago    Home Diabetes Medications:  Amaryl 1 mg (recently prescribed by PCP about a week ago)     Lab Results   Component Value Date    HGBA1C 7.4 (H) 04/17/2024       How often checking glucose at home? Once daily in the AM ( prior to starting Amaryl BG in the 300s, since starting Amaryl BG 70-130s)   BG readings on regimen: 70-130s  Hypoglycemia on the regimen?  No  Missed doses on regimen?  No    Diabetes Complications include:     Hypoglycemia , Diabetic nephropathy  , Diabetic chronic kidney disease     , and Diabetic peripheral neuropathy     Complicating diabetes co morbidities:   CHF and CKD      HPI:   Patient is a 64 y.o. male with a diagnosis of stage D HFrEF, ICMP who underwent HM3 placement as DT (12/1/23) w/ a hospital stay complicated by vasoplegia(requiring Giaprezza), debility, malnutrition/impaired swallowing, and renal failure requiring HD (since weaned off) w/ recent discharge for ADHF presented to the ED with wife due to c/o worsening generalized weakness since 6/10. Per wife patient was mostly sleeping during the day despite sleeping 10 hrs at night. Patient also admits decreased appetite and decreased urine output despite taking his medications as prescribed. He has chronic shortness of breath on exertion. Denies confusion, syncope, diarrhea, constipation, N/V, dysuria, or other complaints. Patient had his prior tunnel cath removed used for HD about 2 weeks ago. He completed a 6 week therapy for Enterococcus bacteremia with ampicillin and rocephin ending 5/30/24 and was switched to PO antibiotics. Off note, patient was recently started by his PCP on glimepiride 1mg and unclear of BG trends during the day. Per patient last fasting  on 6/11.  "Patient reports lasting taking the glimepiride on  AM.   Admitted for worsening renal function with a BUN>100/Cr. 9.9. Endocrinology consulted for management of T2DM.          Interval HPI:   No acute events overnight. Patient in room [id number] A. Blood glucose stable. BG at and above goal on current insulin regimen (None). Steroid use- None .      Renal function- Abnormal -    Vasopressors-  None     Diet Renal Coumadin Restriction; Fluid - 2000mL     Eatin%  Nausea: No  Hypoglycemia and intervention: No  Fever: No  TPN and/or TF: No  BP (S) (!) 90/0   Pulse 77   Temp 97.7 °F (36.5 °C) (Oral)   Resp 19   Ht 6' 1" (1.854 m)   Wt 81.5 kg (179 lb 10.8 oz)   SpO2 (!) 90%   BMI 23.71 kg/m²     Labs Reviewed and Include    Recent Labs   Lab 24  0221   *  129*  129*   CALCIUM 9.0  9.0  9.0   ALBUMIN 2.5*  2.5*  2.5*   PROT 7.6   *  129*  129*   K 4.7  4.7  4.7   CO2 17*  17*  17*     100  100   BUN 30*  30*  30*   CREATININE 3.3*  3.3*  3.3*   ALKPHOS 165*   ALT 65*   AST 64*   BILITOT 0.4     Lab Results   Component Value Date    WBC 9.81 2024    HGB 6.2 (L) 2024    HCT 20.3 (L) 2024    MCV 91 2024     2024     No results for input(s): "TSH", "FREET4" in the last 168 hours.  Lab Results   Component Value Date    HGBA1C 7.4 (H) 2024       Nutritional status:   Body mass index is 23.71 kg/m².  Lab Results   Component Value Date    ALBUMIN 2.5 (L) 2024    ALBUMIN 2.5 (L) 2024    ALBUMIN 2.5 (L) 2024     Lab Results   Component Value Date    PREALBUMIN 22 2024    PREALBUMIN 25 2024    PREALBUMIN 23 2024       Estimated Creatinine Clearance: 26.2 mL/min (A) (based on SCr of 3.3 mg/dL (H)).    Accu-Checks  Recent Labs     24  0736 24  0758 24  1029 24  1220 24  1546 24  2035 24  2119 24  0104 24  0647 24  0748 "   POCTGLUCOSE 155* 209* 123* 127* 121* 136* 141* 126* 193* 182*       Current Medications and/or Treatments Impacting Glycemic Control  Immunotherapy:    Immunosuppressants       None          Steroids:   Hormones (From admission, onward)      None          Pressors:    Autonomic Drugs (From admission, onward)      None          Hyperglycemia/Diabetes Medications:   Antihyperglycemics (From admission, onward)      Start     Stop Route Frequency Ordered    06/14/24 0910  insulin aspart U-100 pen 0-5 Units         -- SubQ Before meals & nightly PRN 06/14/24 0811            ASSESSMENT and PLAN    Cardiac/Vascular  LVAD (left ventricular assist device) present  Managed per primary team  Avoid hypoglycemia        Renal/  * Acute renal failure superimposed on stage 4 chronic kidney disease  Titrate insulin slowly to avoid hypoglycemia as the risk of hypoglycemia increases with decreased creatinine clearance.        Endocrine  Type 2 diabetes mellitus without complication, without long-term current use of insulin  BG goal 140-180  T2DM.  LVAD. Admitted for worsening renal function with a BUN>100/Cr. 9.9. Hypoglycemia likely secondary to RIVAS (glimepiride) with worsened kidney function. Last took on 6/11. On D10 drip for hypoglycemia. BG now elevating.    Plan:  -Recommend stopping D10 infusion and monitoring.  -Novolog /50  -BG monitoring ac/hs  -Hypoglycemia protocol in place    ** Please call Endocrine for any BG related issues **    Discharge plans:   BRANDO  -D/MIKE Wilson PA-C  Endocrinology  [facility] - Cardiac Intensive Care

## 2024-06-15 LAB
ALBUMIN SERPL BCP-MCNC: 2.5 G/DL (ref 3.5–5.2)
ALBUMIN SERPL BCP-MCNC: 2.7 G/DL (ref 3.5–5.2)
ALLENS TEST: ABNORMAL
ALP SERPL-CCNC: 253 U/L (ref 55–135)
ALT SERPL W/O P-5'-P-CCNC: 148 U/L (ref 10–44)
ANION GAP SERPL CALC-SCNC: 11 MMOL/L (ref 8–16)
ANION GAP SERPL CALC-SCNC: 12 MMOL/L (ref 8–16)
APTT PPP: 50.4 SEC (ref 21–32)
ASCENDING AORTA: 3.1 CM
AST SERPL-CCNC: 207 U/L (ref 10–40)
BASOPHILS # BLD AUTO: 0.03 K/UL (ref 0–0.2)
BASOPHILS NFR BLD: 0.3 % (ref 0–1.9)
BILIRUB DIRECT SERPL-MCNC: 0.4 MG/DL (ref 0.1–0.3)
BILIRUB SERPL-MCNC: 0.7 MG/DL (ref 0.1–1)
BSA FOR ECHO PROCEDURE: 2.03 M2
BUN SERPL-MCNC: 11 MG/DL (ref 8–23)
BUN SERPL-MCNC: 7 MG/DL (ref 8–23)
BUN SERPL-MCNC: 7 MG/DL (ref 8–23)
CALCIUM SERPL-MCNC: 8.9 MG/DL (ref 8.7–10.5)
CHLORIDE SERPL-SCNC: 96 MMOL/L (ref 95–110)
CHLORIDE SERPL-SCNC: 96 MMOL/L (ref 95–110)
CHLORIDE SERPL-SCNC: 99 MMOL/L (ref 95–110)
CO2 SERPL-SCNC: 18 MMOL/L (ref 23–29)
CO2 SERPL-SCNC: 21 MMOL/L (ref 23–29)
CO2 SERPL-SCNC: 22 MMOL/L (ref 23–29)
CREAT SERPL-MCNC: 0.8 MG/DL (ref 0.5–1.4)
CREAT SERPL-MCNC: 0.9 MG/DL (ref 0.5–1.4)
CREAT SERPL-MCNC: 1.3 MG/DL (ref 0.5–1.4)
CV ECHO LV RWT: 0.33 CM
DELSYS: ABNORMAL
DIFFERENTIAL METHOD BLD: ABNORMAL
DOP CALC LVOT AREA: 3.4 CM2
DOP CALC LVOT DIAMETER: 2.09 CM
E WAVE DECELERATION TIME: 158 MSEC
E/A RATIO: 1.12
E/E' RATIO: 16.57 M/S
ECHO LV POSTERIOR WALL: 1.03 CM (ref 0.6–1.1)
EJECTION FRACTION: 8 %
EOSINOPHIL # BLD AUTO: 0 K/UL (ref 0–0.5)
EOSINOPHIL NFR BLD: 0 % (ref 0–8)
ERYTHROCYTE [DISTWIDTH] IN BLOOD BY AUTOMATED COUNT: 17.2 % (ref 11.5–14.5)
ERYTHROCYTE [SEDIMENTATION RATE] IN BLOOD BY WESTERGREN METHOD: 18 MM/H
ERYTHROCYTE [SEDIMENTATION RATE] IN BLOOD BY WESTERGREN METHOD: 20 MM/H
ERYTHROCYTE [SEDIMENTATION RATE] IN BLOOD BY WESTERGREN METHOD: 24 MM/H
EST. GFR  (NO RACE VARIABLE): >60 ML/MIN/1.73 M^2
FIO2: 36
FIO2: 40
FIO2: 40
FLOW: 4
FLOW: 4
FLOW: 5
FRACTIONAL SHORTENING: 9 % (ref 28–44)
GLUCOSE SERPL-MCNC: 69 MG/DL (ref 70–110)
GLUCOSE SERPL-MCNC: 77 MG/DL (ref 70–110)
GLUCOSE SERPL-MCNC: 85 MG/DL (ref 70–110)
HCO3 UR-SCNC: 18.7 MMOL/L (ref 24–28)
HCO3 UR-SCNC: 22.4 MMOL/L (ref 24–28)
HCO3 UR-SCNC: 23.6 MMOL/L (ref 24–28)
HCO3 UR-SCNC: 23.8 MMOL/L (ref 24–28)
HCO3 UR-SCNC: 25 MMOL/L (ref 24–28)
HCT VFR BLD AUTO: 19.5 % (ref 40–54)
HCT VFR BLD CALC: 21 %PCV (ref 36–54)
HCT VFR BLD CALC: 22 %PCV (ref 36–54)
HGB BLD-MCNC: 6.3 G/DL (ref 14–18)
HGB FREE PLAS-MCNC: 11.5 MG/DL (ref 0–15.2)
IMM GRANULOCYTES # BLD AUTO: 0.06 K/UL (ref 0–0.04)
IMM GRANULOCYTES NFR BLD AUTO: 0.5 % (ref 0–0.5)
INR PPP: 3.4 (ref 0.8–1.2)
INTERVENTRICULAR SEPTUM: 0.8 CM (ref 0.6–1.1)
LA MAJOR: 5.36 CM
LA MINOR: 5.74 CM
LA WIDTH: 4.95 CM
LACTATE SERPL-SCNC: 2 MMOL/L (ref 0.5–2.2)
LDH SERPL L TO P-CCNC: 1.38 MMOL/L (ref 0.36–1.25)
LDH SERPL L TO P-CCNC: 817 U/L (ref 110–260)
LEFT ATRIUM SIZE: 4.73 CM
LEFT ATRIUM VOLUME INDEX MOD: 34.3 ML/M2
LEFT ATRIUM VOLUME INDEX: 54.1 ML/M2
LEFT ATRIUM VOLUME MOD: 69.92 CM3
LEFT ATRIUM VOLUME: 110.32 CM3
LEFT INTERNAL DIMENSION IN SYSTOLE: 5.7 CM (ref 2.1–4)
LEFT VENTRICLE DIASTOLIC VOLUME INDEX: 97.83 ML/M2
LEFT VENTRICLE DIASTOLIC VOLUME: 199.57 ML
LEFT VENTRICLE MASS INDEX: 117 G/M2
LEFT VENTRICLE SYSTOLIC VOLUME INDEX: 78.4 ML/M2
LEFT VENTRICLE SYSTOLIC VOLUME: 159.84 ML
LEFT VENTRICULAR INTERNAL DIMENSION IN DIASTOLE: 6.28 CM (ref 3.5–6)
LEFT VENTRICULAR MASS: 238.33 G
LV LATERAL E/E' RATIO: 12.89 M/S
LV SEPTAL E/E' RATIO: 23.2 M/S
LYMPHOCYTES # BLD AUTO: 0.2 K/UL (ref 1–4.8)
LYMPHOCYTES NFR BLD: 1.8 % (ref 18–48)
MAGNESIUM SERPL-MCNC: 1.8 MG/DL (ref 1.6–2.6)
MAGNESIUM SERPL-MCNC: 1.9 MG/DL (ref 1.6–2.6)
MAGNESIUM SERPL-MCNC: 2 MG/DL (ref 1.6–2.6)
MCH RBC QN AUTO: 28.5 PG (ref 27–31)
MCHC RBC AUTO-ENTMCNC: 32.3 G/DL (ref 32–36)
MCV RBC AUTO: 88 FL (ref 82–98)
MODE: ABNORMAL
MONOCYTES # BLD AUTO: 0.7 K/UL (ref 0.3–1)
MONOCYTES NFR BLD: 5.5 % (ref 4–15)
MV PEAK A VEL: 1.04 M/S
MV PEAK E VEL: 1.16 M/S
MV STENOSIS PRESSURE HALF TIME: 45.82 MS
MV VALVE AREA P 1/2 METHOD: 4.8 CM2
NEUTROPHILS # BLD AUTO: 11 K/UL (ref 1.8–7.7)
NEUTROPHILS NFR BLD: 91.9 % (ref 38–73)
NRBC BLD-RTO: 0 /100 WBC
OHS CV RV/LV RATIO: 0.56 CM
OHS QRS DURATION: 204 MS
OHS QTC CALCULATION: 647 MS
OXYHGB PLAS-MCNC: 4.4 MG/DL (ref 0–12.4)
PCO2 BLDA: 28.2 MMHG (ref 35–45)
PCO2 BLDA: 30.7 MMHG (ref 35–45)
PCO2 BLDA: 33.5 MMHG (ref 35–45)
PCO2 BLDA: 34.8 MMHG (ref 35–45)
PCO2 BLDA: 38 MMHG (ref 35–45)
PH SMN: 7.35 [PH] (ref 7.35–7.45)
PH SMN: 7.43 [PH] (ref 7.35–7.45)
PH SMN: 7.44 [PH] (ref 7.35–7.45)
PH SMN: 7.49 [PH] (ref 7.35–7.45)
PH SMN: 7.51 [PH] (ref 7.35–7.45)
PHOSPHATE SERPL-MCNC: 1.1 MG/DL (ref 2.7–4.5)
PHOSPHATE SERPL-MCNC: 1.3 MG/DL (ref 2.7–4.5)
PHOSPHATE SERPL-MCNC: 1.7 MG/DL (ref 2.7–4.5)
PISA TR MAX VEL: 2.65 M/S
PLATELET # BLD AUTO: 364 K/UL (ref 150–450)
PMV BLD AUTO: 9.3 FL (ref 9.2–12.9)
PO2 BLDA: 26 MMHG (ref 80–100)
PO2 BLDA: 27 MMHG (ref 40–60)
PO2 BLDA: 27 MMHG (ref 40–60)
PO2 BLDA: 79 MMHG (ref 80–100)
PO2 BLDA: 91 MMHG (ref 80–100)
POC BE: -1 MMOL/L
POC BE: -7 MMOL/L
POC BE: 0 MMOL/L
POC BE: 0 MMOL/L
POC BE: 1 MMOL/L
POC IONIZED CALCIUM: 1.09 MMOL/L (ref 1.06–1.42)
POC IONIZED CALCIUM: 1.09 MMOL/L (ref 1.06–1.42)
POC SATURATED O2: 48 % (ref 95–100)
POC SATURATED O2: 52 % (ref 95–100)
POC SATURATED O2: 53 % (ref 95–100)
POC SATURATED O2: 97 % (ref 95–100)
POC SATURATED O2: 98 % (ref 95–100)
POC TCO2: 20 MMOL/L (ref 24–29)
POC TCO2: 23 MMOL/L (ref 23–27)
POC TCO2: 25 MMOL/L (ref 23–27)
POC TCO2: 25 MMOL/L (ref 23–27)
POC TCO2: 26 MMOL/L (ref 24–29)
POCT GLUCOSE: 66 MG/DL (ref 70–110)
POCT GLUCOSE: 73 MG/DL (ref 70–110)
POCT GLUCOSE: 90 MG/DL (ref 70–110)
POTASSIUM BLD-SCNC: 4.5 MMOL/L (ref 3.5–5.1)
POTASSIUM BLD-SCNC: 4.7 MMOL/L (ref 3.5–5.1)
POTASSIUM SERPL-SCNC: 4.3 MMOL/L (ref 3.5–5.1)
POTASSIUM SERPL-SCNC: 4.4 MMOL/L (ref 3.5–5.1)
POTASSIUM SERPL-SCNC: 4.6 MMOL/L (ref 3.5–5.1)
PROT SERPL-MCNC: 7.8 G/DL (ref 6–8.4)
PROTHROMBIN TIME: 34.7 SEC (ref 9–12.5)
RA MAJOR: 5.13 CM
RA PRESSURE ESTIMATED: 8 MMHG
RA WIDTH: 4.39 CM
RBC # BLD AUTO: 2.21 M/UL (ref 4.6–6.2)
RIGHT VENTRICULAR END-DIASTOLIC DIMENSION: 3.49 CM
RV TB RVSP: 11 MMHG
SAMPLE: ABNORMAL
SINUS: 3.63 CM
SITE: ABNORMAL
SODIUM BLD-SCNC: 130 MMOL/L (ref 136–145)
SODIUM BLD-SCNC: 130 MMOL/L (ref 136–145)
SODIUM SERPL-SCNC: 129 MMOL/L (ref 136–145)
SP02: 100
STJ: 2.64 CM
TDI LATERAL: 0.09 M/S
TDI SEPTAL: 0.05 M/S
TDI: 0.07 M/S
TR MAX PG: 28 MMHG
TRICUSPID ANNULAR PLANE SYSTOLIC EXCURSION: 0.98 CM
TV REST PULMONARY ARTERY PRESSURE: 36 MMHG
WBC # BLD AUTO: 11.96 K/UL (ref 3.9–12.7)
Z-SCORE OF LEFT VENTRICULAR DIMENSION IN END DIASTOLE: 0.32
Z-SCORE OF LEFT VENTRICULAR DIMENSION IN END SYSTOLE: 3.36

## 2024-06-15 PROCEDURE — 63600175 PHARM REV CODE 636 W HCPCS: Performed by: STUDENT IN AN ORGANIZED HEALTH CARE EDUCATION/TRAINING PROGRAM

## 2024-06-15 PROCEDURE — 84132 ASSAY OF SERUM POTASSIUM: CPT

## 2024-06-15 PROCEDURE — 99291 CRITICAL CARE FIRST HOUR: CPT | Mod: ,,, | Performed by: INTERNAL MEDICINE

## 2024-06-15 PROCEDURE — 27000248 HC VAD-ADDITIONAL DAY

## 2024-06-15 PROCEDURE — 83615 LACTATE (LD) (LDH) ENZYME: CPT | Performed by: STUDENT IN AN ORGANIZED HEALTH CARE EDUCATION/TRAINING PROGRAM

## 2024-06-15 PROCEDURE — 85025 COMPLETE CBC W/AUTO DIFF WBC: CPT | Performed by: STUDENT IN AN ORGANIZED HEALTH CARE EDUCATION/TRAINING PROGRAM

## 2024-06-15 PROCEDURE — 82330 ASSAY OF CALCIUM: CPT

## 2024-06-15 PROCEDURE — 63600175 PHARM REV CODE 636 W HCPCS: Mod: JZ,JG | Performed by: STUDENT IN AN ORGANIZED HEALTH CARE EDUCATION/TRAINING PROGRAM

## 2024-06-15 PROCEDURE — 80100008 HC CRRT DAILY MAINTENANCE

## 2024-06-15 PROCEDURE — 93750 INTERROGATION VAD IN PERSON: CPT | Mod: ,,, | Performed by: INTERNAL MEDICINE

## 2024-06-15 PROCEDURE — 90945 DIALYSIS ONE EVALUATION: CPT

## 2024-06-15 PROCEDURE — 25000003 PHARM REV CODE 250: Performed by: NURSE PRACTITIONER

## 2024-06-15 PROCEDURE — 36600 WITHDRAWAL OF ARTERIAL BLOOD: CPT

## 2024-06-15 PROCEDURE — 63600175 PHARM REV CODE 636 W HCPCS: Performed by: NURSE PRACTITIONER

## 2024-06-15 PROCEDURE — 25000003 PHARM REV CODE 250: Performed by: STUDENT IN AN ORGANIZED HEALTH CARE EDUCATION/TRAINING PROGRAM

## 2024-06-15 PROCEDURE — 51798 US URINE CAPACITY MEASURE: CPT

## 2024-06-15 PROCEDURE — 83605 ASSAY OF LACTIC ACID: CPT

## 2024-06-15 PROCEDURE — 94761 N-INVAS EAR/PLS OXIMETRY MLT: CPT

## 2024-06-15 PROCEDURE — 99292 CRITICAL CARE ADDL 30 MIN: CPT | Mod: ,,, | Performed by: INTERNAL MEDICINE

## 2024-06-15 PROCEDURE — 83605 ASSAY OF LACTIC ACID: CPT | Performed by: STUDENT IN AN ORGANIZED HEALTH CARE EDUCATION/TRAINING PROGRAM

## 2024-06-15 PROCEDURE — 82803 BLOOD GASES ANY COMBINATION: CPT

## 2024-06-15 PROCEDURE — 84295 ASSAY OF SERUM SODIUM: CPT

## 2024-06-15 PROCEDURE — 27000221 HC OXYGEN, UP TO 24 HOURS

## 2024-06-15 PROCEDURE — 85730 THROMBOPLASTIN TIME PARTIAL: CPT | Performed by: STUDENT IN AN ORGANIZED HEALTH CARE EDUCATION/TRAINING PROGRAM

## 2024-06-15 PROCEDURE — 99900035 HC TECH TIME PER 15 MIN (STAT)

## 2024-06-15 PROCEDURE — 80076 HEPATIC FUNCTION PANEL: CPT | Performed by: STUDENT IN AN ORGANIZED HEALTH CARE EDUCATION/TRAINING PROGRAM

## 2024-06-15 PROCEDURE — 83735 ASSAY OF MAGNESIUM: CPT | Performed by: STUDENT IN AN ORGANIZED HEALTH CARE EDUCATION/TRAINING PROGRAM

## 2024-06-15 PROCEDURE — 83735 ASSAY OF MAGNESIUM: CPT | Mod: 91 | Performed by: STUDENT IN AN ORGANIZED HEALTH CARE EDUCATION/TRAINING PROGRAM

## 2024-06-15 PROCEDURE — 80069 RENAL FUNCTION PANEL: CPT | Performed by: NURSE PRACTITIONER

## 2024-06-15 PROCEDURE — 80069 RENAL FUNCTION PANEL: CPT | Mod: 91 | Performed by: STUDENT IN AN ORGANIZED HEALTH CARE EDUCATION/TRAINING PROGRAM

## 2024-06-15 PROCEDURE — 21400001 HC TELEMETRY ROOM

## 2024-06-15 PROCEDURE — 25000003 PHARM REV CODE 250: Performed by: PHYSICIAN ASSISTANT

## 2024-06-15 PROCEDURE — 99232 SBSQ HOSP IP/OBS MODERATE 35: CPT | Mod: ,,, | Performed by: PHYSICIAN ASSISTANT

## 2024-06-15 PROCEDURE — 85610 PROTHROMBIN TIME: CPT | Performed by: STUDENT IN AN ORGANIZED HEALTH CARE EDUCATION/TRAINING PROGRAM

## 2024-06-15 PROCEDURE — 99291 CRITICAL CARE FIRST HOUR: CPT | Mod: FS,,, | Performed by: NURSE PRACTITIONER

## 2024-06-15 PROCEDURE — 99233 SBSQ HOSP IP/OBS HIGH 50: CPT | Mod: ,,, | Performed by: STUDENT IN AN ORGANIZED HEALTH CARE EDUCATION/TRAINING PROGRAM

## 2024-06-15 PROCEDURE — 82800 BLOOD PH: CPT

## 2024-06-15 PROCEDURE — 85014 HEMATOCRIT: CPT

## 2024-06-15 RX ORDER — WARFARIN 2.5 MG/1
2.5 TABLET ORAL DAILY
Status: DISCONTINUED | OUTPATIENT
Start: 2024-06-16 | End: 2024-07-01 | Stop reason: HOSPADM

## 2024-06-15 RX ORDER — MAGNESIUM SULFATE HEPTAHYDRATE 40 MG/ML
2 INJECTION, SOLUTION INTRAVENOUS
Status: ACTIVE | OUTPATIENT
Start: 2024-06-15 | End: 2024-06-16

## 2024-06-15 RX ORDER — LIDOCAINE 50 MG/G
2 PATCH TOPICAL
Status: DISCONTINUED | OUTPATIENT
Start: 2024-06-15 | End: 2024-07-01 | Stop reason: HOSPADM

## 2024-06-15 RX ORDER — HYDROCODONE BITARTRATE AND ACETAMINOPHEN 500; 5 MG/1; MG/1
TABLET ORAL CONTINUOUS
Status: ACTIVE | OUTPATIENT
Start: 2024-06-15 | End: 2024-06-16

## 2024-06-15 RX ORDER — MORPHINE SULFATE 2 MG/ML
0.5 INJECTION, SOLUTION INTRAMUSCULAR; INTRAVENOUS EVERY 4 HOURS PRN
Status: DISCONTINUED | OUTPATIENT
Start: 2024-06-15 | End: 2024-06-15

## 2024-06-15 RX ORDER — QUETIAPINE FUMARATE 25 MG/1
25 TABLET, FILM COATED ORAL NIGHTLY
Status: DISCONTINUED | OUTPATIENT
Start: 2024-06-15 | End: 2024-06-19

## 2024-06-15 RX ORDER — MORPHINE SULFATE 2 MG/ML
0.5 INJECTION, SOLUTION INTRAMUSCULAR; INTRAVENOUS ONCE
Status: COMPLETED | OUTPATIENT
Start: 2024-06-15 | End: 2024-06-15

## 2024-06-15 RX ORDER — OLANZAPINE 10 MG/2ML
2.5 INJECTION, POWDER, FOR SOLUTION INTRAMUSCULAR ONCE
Status: COMPLETED | OUTPATIENT
Start: 2024-06-15 | End: 2024-06-15

## 2024-06-15 RX ADMIN — FOLIC ACID 1 MG: 1 TABLET ORAL at 09:06

## 2024-06-15 RX ADMIN — AMLODIPINE BESYLATE 5 MG: 5 TABLET ORAL at 09:06

## 2024-06-15 RX ADMIN — HYDRALAZINE HYDROCHLORIDE 100 MG: 50 TABLET ORAL at 01:06

## 2024-06-15 RX ADMIN — BUMETANIDE 1 MG/HR: 0.25 INJECTION INTRAMUSCULAR; INTRAVENOUS at 01:06

## 2024-06-15 RX ADMIN — AMIODARONE HYDROCHLORIDE 400 MG: 200 TABLET ORAL at 09:06

## 2024-06-15 RX ADMIN — MORPHINE SULFATE 0.5 MG: 2 INJECTION, SOLUTION INTRAMUSCULAR; INTRAVENOUS at 11:06

## 2024-06-15 RX ADMIN — LIDOCAINE 5% 2 PATCH: 700 PATCH TOPICAL at 12:06

## 2024-06-15 RX ADMIN — AMOXICILLIN 500 MG: 500 CAPSULE ORAL at 09:06

## 2024-06-15 RX ADMIN — PANTOPRAZOLE SODIUM 40 MG: 40 TABLET, DELAYED RELEASE ORAL at 09:06

## 2024-06-15 RX ADMIN — DEXTROSE MONOHYDRATE 125 ML: 100 INJECTION, SOLUTION INTRAVENOUS at 02:06

## 2024-06-15 RX ADMIN — VENLAFAXINE 37.5 MG: 37.5 TABLET ORAL at 09:06

## 2024-06-15 RX ADMIN — OLANZAPINE 2.5 MG: 10 INJECTION, POWDER, FOR SOLUTION INTRAMUSCULAR at 10:06

## 2024-06-15 RX ADMIN — MORPHINE SULFATE 0.5 MG: 2 INJECTION, SOLUTION INTRAMUSCULAR; INTRAVENOUS at 09:06

## 2024-06-15 RX ADMIN — SODIUM CHLORIDE: 9 INJECTION, SOLUTION INTRAVENOUS at 06:06

## 2024-06-15 RX ADMIN — HYDRALAZINE HYDROCHLORIDE 100 MG: 50 TABLET ORAL at 06:06

## 2024-06-15 RX ADMIN — CHOLECALCIFEROL TAB 25 MCG (1000 UNIT) 1000 UNITS: 25 TAB at 09:06

## 2024-06-15 RX ADMIN — ACETAMINOPHEN 650 MG: 325 TABLET ORAL at 12:06

## 2024-06-15 NOTE — PROGRESS NOTES
06/15/24 0640   Treatment   Treatment Type SLED   Treatment Status Restart   Dialysis Machine Number [id number]   Dialyzer Time (hours) 0   BVP (Liters) 0 L   Solutions Labeled and Current  Yes   Access Temporary Cath;Right;IJ   Catheter Dressing Intact  Yes   Alarms Engaged Yes   CRRT Comments SLED restarted per MD orders     SLED restarted per MD orders. VS stable to start tx. Starting UF set at 200 with a goal of 500. Report given to primary nurse.

## 2024-06-15 NOTE — CARE UPDATE
-Glucose Goal 140-180    -A1C:   Hemoglobin A1C   Date Value Ref Range Status   04/17/2024 7.4 (H) 4.0 - 5.6 % Final     Comment:     ADA Screening Guidelines:  5.7-6.4%  Consistent with prediabetes  >or=6.5%  Consistent with diabetes    High levels of fetal hemoglobin interfere with the HbA1C  assay. Heterozygous hemoglobin variants (HbS, HgC, etc)do  not significantly interfere with this assay.   However, presence of multiple variants may affect accuracy.           -HOME REGIMEN:Amaryl 1 mg (recently prescribed by PCP about a week ago)      -GLUCOSE TREND FOR THE PAST 24HRS:   Recent Labs   Lab 06/14/24  0104 06/14/24  0647 06/14/24  0748 06/14/24  1318 06/14/24  2211 06/15/24  0821   POCTGLUCOSE 126* 193* 182* 129* 95 73     CMP  Sodium   Date Value Ref Range Status   06/15/2024 129 (L) 136 - 145 mmol/L Final   06/15/2024 129 (L) 136 - 145 mmol/L Final   06/15/2024 129 (L) 136 - 145 mmol/L Final   06/15/2024 129 (L) 136 - 145 mmol/L Final     Potassium   Date Value Ref Range Status   06/15/2024 4.6 3.5 - 5.1 mmol/L Final   06/15/2024 4.6 3.5 - 5.1 mmol/L Final   06/15/2024 4.6 3.5 - 5.1 mmol/L Final   06/15/2024 4.6 3.5 - 5.1 mmol/L Final     Chloride   Date Value Ref Range Status   06/15/2024 99 95 - 110 mmol/L Final   06/15/2024 99 95 - 110 mmol/L Final   06/15/2024 99 95 - 110 mmol/L Final   06/15/2024 99 95 - 110 mmol/L Final     CO2   Date Value Ref Range Status   06/15/2024 18 (L) 23 - 29 mmol/L Final   06/15/2024 18 (L) 23 - 29 mmol/L Final   06/15/2024 18 (L) 23 - 29 mmol/L Final   06/15/2024 18 (L) 23 - 29 mmol/L Final     Glucose   Date Value Ref Range Status   06/15/2024 77 70 - 110 mg/dL Final   06/15/2024 77 70 - 110 mg/dL Final   06/15/2024 77 70 - 110 mg/dL Final   06/15/2024 77 70 - 110 mg/dL Final     BUN   Date Value Ref Range Status   06/15/2024 11 8 - 23 mg/dL Final   06/15/2024 11 8 - 23 mg/dL Final   06/15/2024 11 8 - 23 mg/dL Final   06/15/2024 11 8 - 23 mg/dL Final     Creatinine   Date  Value Ref Range Status   06/15/2024 1.3 0.5 - 1.4 mg/dL Final   06/15/2024 1.3 0.5 - 1.4 mg/dL Final   06/15/2024 1.3 0.5 - 1.4 mg/dL Final   06/15/2024 1.3 0.5 - 1.4 mg/dL Final     Calcium   Date Value Ref Range Status   06/15/2024 8.9 8.7 - 10.5 mg/dL Final   06/15/2024 8.9 8.7 - 10.5 mg/dL Final   06/15/2024 8.9 8.7 - 10.5 mg/dL Final   06/15/2024 8.9 8.7 - 10.5 mg/dL Final     Total Protein   Date Value Ref Range Status   06/15/2024 7.8 6.0 - 8.4 g/dL Final     Albumin   Date Value Ref Range Status   06/15/2024 2.5 (L) 3.5 - 5.2 g/dL Final   06/15/2024 2.5 (L) 3.5 - 5.2 g/dL Final   06/15/2024 2.5 (L) 3.5 - 5.2 g/dL Final   06/15/2024 2.5 (L) 3.5 - 5.2 g/dL Final     Total Bilirubin   Date Value Ref Range Status   06/15/2024 0.7 0.1 - 1.0 mg/dL Final     Comment:     For infants and newborns, interpretation of results should be based  on gestational age, weight and in agreement with clinical  observations.    Premature Infant recommended reference ranges:  Up to 24 hours.............<8.0 mg/dL  Up to 48 hours............<12.0 mg/dL  3-5 days..................<15.0 mg/dL  6-29 days.................<15.0 mg/dL       Alkaline Phosphatase   Date Value Ref Range Status   06/15/2024 253 (H) 55 - 135 U/L Final     AST   Date Value Ref Range Status   06/15/2024 207 (H) 10 - 40 U/L Final     ALT   Date Value Ref Range Status   06/15/2024 148 (H) 10 - 44 U/L Final     Anion Gap   Date Value Ref Range Status   06/15/2024 12 8 - 16 mmol/L Final   06/15/2024 12 8 - 16 mmol/L Final   06/15/2024 12 8 - 16 mmol/L Final   06/15/2024 12 8 - 16 mmol/L Final     eGFR   Date Value Ref Range Status   06/15/2024 >60.0 >60 mL/min/1.73 m^2 Final   06/15/2024 >60.0 >60 mL/min/1.73 m^2 Final   06/15/2024 >60.0 >60 mL/min/1.73 m^2 Final   06/15/2024 >60.0 >60 mL/min/1.73 m^2 Final           -NO HYPOGYCEMIAS NOTED     - Diet  Diet Renal Coumadin Restriction; Fluid - 2000mL    Cardiac/Vascular  LVAD (left ventricular assist device)  present  Managed per primary team  Avoid hypoglycemia           Renal/  * Acute renal failure superimposed on stage 4 chronic kidney disease  Titrate insulin slowly to avoid hypoglycemia as the risk of hypoglycemia increases with decreased creatinine clearance.           Endocrine  Type 2 diabetes mellitus without complication, without long-term current use of insulin  BG goal 140-180  T2DM.  LVAD. Admitted for worsening renal function with a BUN>100/Cr. 9.9. Hypoglycemia likely secondary to RIVAS (glimepiride) with worsened kidney function. Last took on 6/11. D10 stopped yesterday.  Bg stable.  Will monitor.     Plan:  -Continue Novolog /50  -BG monitoring ac/hs  -Hypoglycemia protocol in place    ** Please call Endocrine for any BG related issues **     Discharge plans:   TBD  -D/C Amaryl

## 2024-06-15 NOTE — ASSESSMENT & PLAN NOTE
-HeartMate 3 Implanted  12/1/2023  as DT  -Cont Coumadin, dosing by PharmD.  Goal INR 2.0-3.0 . Supra therapeutic today.   -Antiplatelets Not on ASA  -LDH is stable overall today. Will continue to monitor daily.  -Speed set at  5000     -Interrogation notable for no events  -Not listed for OHTx, declined for OHTX due to comorbidities     Procedure: Device Interrogation Including analysis of device parameters  Current Settings: Ventricular Assist Device  Review of device function is stable        6/15/2024     6:01 AM 6/15/2024     5:01 AM 6/15/2024     4:01 AM 6/15/2024     3:01 AM 6/15/2024     2:01 AM 6/15/2024     1:01 AM 6/15/2024    12:01 AM   TXP LVAD INTERROGATIONS   Type HeartMate3 HeartMate3 HeartMate3 HeartMate3 HeartMate3 HeartMate3 HeartMate3   Flow 4.6 4.5 4..2 4.1 4.2 4.2 4.4   Speed 5000 5000 5000 5000 5000 5000 5000   PI 3.3 3.2 4.2 4.5 4.2 3.8 3.5   Power ([place]) 3.4 3.4 3.2 3.3 3.2 3.5 3.4   LSL 4600 4600 4600 4600 4600 4600 4600   Pulsatility Intermittent pulse Intermittent pulse Intermittent pulse Intermittent pulse Intermittent pulse Intermittent pulse Intermittent pulse

## 2024-06-15 NOTE — PROGRESS NOTES
06/15/2024  Deni Frye    Current provider:  Sajan Hurley, *    Device interrogation:      6/15/2024     6:01 AM 6/15/2024     5:01 AM 6/15/2024     4:01 AM 6/15/2024     3:01 AM 6/15/2024     2:01 AM 6/15/2024     1:01 AM 6/15/2024    12:01 AM   TXP LVAD INTERROGATIONS   Type HeartMate3 HeartMate3 HeartMate3 HeartMate3 HeartMate3 HeartMate3 HeartMate3   Flow 4.6 4.5 4..2 4.1 4.2 4.2 4.4   Speed 5000 5000 5000 5000 5000 5000 5000   PI 3.3 3.2 4.2 4.5 4.2 3.8 3.5   Power ([place]) 3.4 3.4 3.2 3.3 3.2 3.5 3.4   LSL 4600 4600 4600 4600 4600 4600 4600   Pulsatility Intermittent pulse Intermittent pulse Intermittent pulse Intermittent pulse Intermittent pulse Intermittent pulse Intermittent pulse          Rounded on [place] to ensure all mechanical assist device settings (IABP or VAD) were appropriate and all parameters were within limits.  I was able to ensure all back up equipment was present, the staff had no issues, and the Perfusion Department daily rounding was complete.      For implantable VADs: Interrogation of Ventricular assist device was performed with analysis of device parameters and review of device function. I have personally reviewed the interrogation findings and agree with findings as stated.     In emergency, the nursing units have been notified to contact the perfusion department either by:  Calling [phone] from 630am to 4pm Mon thru Fri, utilizing the On-Call Finder functionality of Epic and searching for Perfusion, or by contacting the hospital  from 4pm to 630am and on weekends and asking to speak with the perfusionist on call.    4:52 PM

## 2024-06-15 NOTE — PROGRESS NOTES
[facility] - Cardiac Intensive Care  Nephrology  Progress Note    Patient Name: Radha Abbott  MRN: [id number]  Admission Date: 6/11/2024  Hospital Length of Stay: 4 days  Attending Provider: Sajan Hurley, *   Primary Care Physician: Vasu Kong MD  Principal Problem:Acute renal failure superimposed on stage 4 chronic kidney disease    Subjective:     HPI: Mr. Abbott is a 64-year-old man with ischemic cardiomyopathy with most recent TTE showing EF 10 -15% and G3DD s/p [employer] ICM placement previously on chronic dobutamine infusion however now with Heartmate III LVAD placed on 12/01/2023, CAD s/p x3 vessel CABG back in 2009, type 2 diabetes mellitus (most recent hemoglobin A1c 7.4%), hypertension, HLD, history of ventricular fibrillation for which he is on amiodarone, chronic AC with Coumadin, advanced CKD V (baseline creatinine ~6) with recurrent AKIs and previous dialysis dependence (tunneled HD catheter removed in early March of this year). He had a recent hospitalization where he was treated for fluid overload acute on chronic CHF exacerbation and IVÁN thought to be 2/2 type 2 CRS and was subsequently diuresed and discharged with a creatinine of 6.2. He presents to the hospital on 6/11 for concerns of worsening fatigue that started one day prior to him arriving to the hospital, and has had difficulties getting out of bed. He has also noted a decrease in his urine output over the past 36 hours despite Diuretic use. He admits adherence to his Metolazone and Bumex at home, however has not passed any urine in the past 10 hours. Bed side bladder scan revealed 200 mL of urine in his bladder. Upon arrival to the ER, his is hemodynamically stable, Labs showed Hgb of 6.3, electrolytes within acceptable limits, Serum CO2 of 21, BUN of 118, and a creatinine of 9.8. He denies feeling fluid overloaded or short of breath, reports good appetite, denies nausea, vomiting, or metallic taste in his mouth. Chest xray  showed mild central vascular congestion and interstitial pulmonary opacities which appear improved compared to previous studies. CT abdomen/Pelvis without contrast revealed Kidneys that were reported as normal in size and location, and without hydronephrosis or ureteral dilation.     Interval History: Seen at bedside with SLED going on, CVP 18 this AM, agitated and restless, on 2 liter oxygen via nasal cannula,anuric, net -800ml/24h.    Review of patient's allergies indicates:  No Known Allergies  Current Facility-Administered Medications   Medication Frequency    0.9%  NaCl infusion (CRRT USE ONLY) Continuous    0.9%  NaCl infusion Continuous    acetaminophen tablet 650 mg Q6H PRN    amiodarone tablet 400 mg Daily    amLODIPine tablet 5 mg Daily    amoxicillin capsule 500 mg Q12H    atorvastatin tablet 40 mg QHS    dextrose 10% bolus 125 mL 125 mL PRN    dextrose 10% bolus 250 mL 250 mL PRN    folic acid tablet 1 mg Daily    glucagon (human recombinant) injection 1 mg PRN    glucagon (human recombinant) injection 1 mg PRN    glucose chewable tablet 16 g PRN    glucose chewable tablet 24 g PRN    hydrALAZINE tablet 100 mg Q8H    insulin aspart U-100 pen 0-5 Units QID (AC + HS) PRN    LIDOcaine 5 % patch 2 patch Q24H    magnesium sulfate 2g in water 50mL IVPB (premix) PRN    morphine injection 0.5 mg Q4H PRN    pantoprazole EC tablet 40 mg Daily    QUEtiapine tablet 25 mg QHS    simethicone chewable tablet 80 mg TID PRN    sodium phosphate 20.01 mmol in dextrose 5 % (D5W) 250 mL IVPB PRN    sodium phosphate 30 mmol in dextrose 5 % (D5W) 250 mL IVPB PRN    sodium phosphate 39.99 mmol in dextrose 5 % (D5W) 250 mL IVPB PRN    trazodone split tablet 25 mg Nightly PRN    venlafaxine tablet 37.5 mg Daily    vitamin D 1000 units tablet 1,000 Units Daily    [START ON 6/16/2024] warfarin (COUMADIN) tablet 2.5 mg Daily       Objective:     Vital Signs (Most Recent):  Temp: 97.1 °F (36.2 °C) (06/15/24 1101)  Pulse: 86 (06/15/24  1301)  Resp: (!) 22 (06/15/24 1301)  BP: (!) 98/7 (06/15/24 1127)  SpO2: 100 % (06/15/24 1301) Vital Signs (24h Range):  Temp:  [97.1 °F (36.2 °C)-98.6 °F (37 °C)] 97.1 °F (36.2 °C)  Pulse:  [72-89] 86  Resp:  [18-44] 22  SpO2:  [60 %-100 %] 100 %  BP: ()/(0-94) 98/7     Weight: 79.8 kg (176 lb) (06/15/24 1127)  Body mass index is 23.22 kg/m².  Body surface area is 2.03 meters squared.    I/O last 3 completed shifts:  In: 4833.3 [P.O.:250; I.V.:4583.3]  Out: 5983 [Urine:30; Other:5953]     Physical Exam  Constitutional:       General: He is not in acute distress.     Appearance: He is ill-appearing.   Pulmonary:      Effort: Pulmonary effort is normal. No respiratory distress.      Breath sounds: Rales present. No wheezing.   Musculoskeletal:      Right lower leg: Edema present.      Left lower leg: Edema present.   Skin:     General: Skin is warm.   Neurological:      Mental Status: He is alert.   Psychiatric:      Comments: Agitated, restless          Significant Labs:  BMP:   Recent Labs   Lab 06/15/24  0234   GLU 77  77  77  77   *  129*  129*  129*   K 4.6  4.6  4.6  4.6   CL 99  99  99  99   CO2 18*  18*  18*  18*   BUN 11  11  11  11   CREATININE 1.3  1.3  1.3  1.3   CALCIUM 8.9  8.9  8.9  8.9   MG 2.0  2.0  2.0  2.0     CBC:   Recent Labs   Lab 06/15/24  0234 06/15/24  0810 06/15/24  1247   WBC 11.96  --   --    RBC 2.21*  --   --    HGB 6.3*  --   --    HCT 19.5*   < > 22*     --   --    MCV 88  --   --    MCH 28.5  --   --    MCHC 32.3  --   --     < > = values in this interval not displayed.        Significant Imaging:  Labs: Reviewed  ECG: Reviewed  X-Ray: Reviewed  US: Reviewed  Assessment/Plan:     Cardiac/Vascular  LVAD (left ventricular assist device) present  -HTS following.    Renal/  * Acute renal failure superimposed on stage 4 chronic kidney disease  Patient with a baseline eGFR that appears to be variable however was noted to be in the 15-20 range  in February before his recent IVÁN in May, presents now with fatigue and worsening IVÁN with a creatinine of 9.8 and eGFR of 5.5. BUN was noted to be elevated at 117, other electrolytes were normal. He was noted to have some asterixis on exam (which he states is chronic for him and unchanged from his baseline), however denies any other signs or symptoms of uremia. Bedside bladder scan revealed 200 mL of urine in his bladder and his last bladder void was 10 hours prior to this consult.     SLED initiated 6/13 x4 hours.  Mental status improved.  6/14:  8 hour SLED overnight, net even.    Recommendations  -Will plan to continue SLED till tomorrow morning, -500 as tolerated.  -electrolytes will be adjusted to current electrolytes  -Avoid nephrotoxic agents (IV contrast, NSAID's, gadolinium contrast, PPI's) if able.   -Strict I's and O's  -RFP q 12 hours while on RRT.  PRN mg/phos replacement  -Renally dose all medications  -will need OP HD upon discharge, recommend CM/SW involvement to start workup.  -will need IR evaluation once out of ICU for TDC placement.        Thank you for your consult. I will follow-up with patient. Please contact us if you have any additional questions.    Kamilla Angela MD  Nephrology  [facility] - Cardiac Intensive Care

## 2024-06-15 NOTE — PROGRESS NOTES
06/15/24 0339   Treatment   Treatment Type SLED   Treatment Status Blood returned;Discontinued treatment   Dialysis Machine Number [id number]   Dialyzer Time (hours) 11.56   BVP (Liters) 105.9 L   Access Temporary Cath;Right;IJ   CRRT Comments SLED completed, blood returned by primary nurse     SLED for 12 hours completed. Blood returned by primary nurse. Machine disinfected.

## 2024-06-15 NOTE — PLAN OF CARE
Cardiac ICU Care Plan    POC reviewed with Radha Scar and family. Questions and concerns addressed. No acute events today. Pt progressing toward goals. Will continue to monitor. See below and flowsheets for full assessment and VS info.     -CVPs 14, 15, 22  -SVO2 49%  -Pt complaint of chest pain last night, MD Bernardino Parmar notified. Tylenol and lidocaine patch given. No opioids given d/t patient being difficult to arrouse when given previously   -Pt increasingly confused throughout the night, MD aware  -CRRT clotted off at 0330, pt was rinsed back. CRRT restarted d/t elevated CVP at 0630    Neuro:  [place] Coma Scale  Best Eye Response: 3-->(E3) to speech  Best Motor Response: 6-->(M6) obeys commands  Best Verbal Response: 5-->(V5) oriented  Michelle Coma Scale Score: 14  Assessment Qualifiers: patient not sedated/intubated, no eye obstruction present  Pupil PERRLA: yes    24 hr Temp:  [97.7 °F (36.5 °C)-98.6 °F (37 °C)]      CV:  Rhythm: normal sinus rhythm   DVT prophylaxis: VTE Required Core Measure: Pharmacological prophylaxis initiated/maintained    CVP (mean): (S) 22 mmHg (06/15/24 0401)       SVO2 (%): (S) 49 % (06/14/24 1901)       Type: HeartMate3       Pulses  Right Radial Pulse: 1+ (weak)  Left Radial Pulse: 1+ (weak)  Right Dorsalis Pedis Pulse: 1+ (weak)  Left Dorsalis Pedis Pulse: 1+ (weak)  Right Posterior Tibial Pulse: 1+ (weak)  Left Posterior Tibial Pulse: 1+ (weak)    Resp:  Flow (L/min) (Oxygen Therapy): 3  Oxygen Concentration (%): 32    GI/:  GI prophylaxis: no  Diet/Nutrition Received: no added salt, restrict fluids  Last Bowel Movement: 06/14/24  Voiding Characteristics: oliguria, patient on CRRT   Intake/Output Summary (Last 24 hours) at 6/15/2024 0647  Last data filed at 6/15/2024 0401  Gross per 24 hour   Intake 2835.41 ml   Output 3425 ml   Net -589.59 ml     Treatment Type: Slow low efficiency dialysis  UF Rate: 200 cc/hr      Labs/Accuchecks:  Recent Labs   Lab 06/13/24  0236  06/14/24  0221 06/14/24  1835 06/15/24  0234   WBC 9.06 9.81  --  11.96   RBC 2.27* 2.22*  --  2.21*   HGB 6.5* 6.2*  --  6.3*   HCT 20.4* 20.3* 21* 19.5*    312  --  364      Recent Labs   Lab 06/13/24  0349 06/14/24  0221 06/15/24  0234   INR 2.8* 3.2* 3.4*   APTT 48.4* 53.5* 50.4*      Recent Labs     06/15/24  0234   *  129*  129*  129*   K 4.6  4.6  4.6  4.6   CO2 18*  18*  18*  18*   CL 99  99  99  99   BUN 11  11  11  11   CREATININE 1.3  1.3  1.3  1.3   ALKPHOS 253*   *   *   BILITOT 0.7       Recent Labs   Lab 06/11/24  1933   TROPONINI 0.051*      Recent Labs     06/14/24  0747 06/14/24 1835 06/14/24 2005   PH 7.324* 7.423 7.380   PCO2 37.5 30.1* 37.1   PO2 29* 84 27*   HCO3 19.5* 19.6* 22.0*   POCSATURATED 51 97 49   BE -6* -5* -3*       Electrolytes: N/A - electrolytes WDL  Accuchecks: ACHS    Gtts/LDAs:   sodium chloride 0.9%   Intravenous Continuous 200 mL/hr at 06/15/24 0640 New Bag at 06/15/24 0640    sodium chloride 0.9%   Intravenous Continuous   Stopped at 06/13/24 0117    bumetanide (BUMEX) 25 mg in 100 mL infusion (conc: 0.25 mg/mL)  1 mg/hr Intravenous Continuous 4 mL/hr at 06/15/24 0401 1 mg/hr at 06/15/24 0401       Lines/Drains/Airways       Central Venous Catheter Line  Duration             Trialysis (Dialysis) Catheter 06/12/24 0124 right internal jugular 3 days              Line  Duration                  VAD 12/01/23 1015 Left ventricular assist device HeartMate 3 196 days              Peripheral Intravenous Line  Duration                  Midline Catheter - Single Lumen 06/13/24 1623 Left basilic vein (medial side of arm) 20g x 10cm 1 day                    Skin/Wounds  Bathing/Skin Care: bath, complete;back care;dressed/undressed;linen changed;incontinence care (06/14/24 2101)  Wounds: No  Wound care consulted: No    Problem: Adult Inpatient Plan of Care  Goal: Plan of Care Review  Outcome: Progressing  Goal: Patient-Specific Goal  (Individualized)  Outcome: Progressing  Goal: Absence of Hospital-Acquired Illness or Injury  Outcome: Progressing  Goal: Optimal Comfort and Wellbeing  Outcome: Progressing  Goal: Readiness for Transition of Care  Outcome: Progressing     Problem: Infection  Goal: Absence of Infection Signs and Symptoms  Outcome: Progressing     Problem: Diabetes Comorbidity  Goal: Blood Glucose Level Within Targeted Range  Outcome: Progressing     Problem: Acute Kidney Injury/Impairment  Goal: Fluid and Electrolyte Balance  Outcome: Progressing  Goal: Improved Oral Intake  Outcome: Progressing  Goal: Effective Renal Function  Outcome: Progressing     Problem: Pneumonia  Goal: Fluid Balance  Outcome: Progressing  Goal: Resolution of Infection Signs and Symptoms  Outcome: Progressing  Goal: Effective Oxygenation and Ventilation  Outcome: Progressing     Problem: Skin Injury Risk Increased  Goal: Skin Health and Integrity  Outcome: Progressing     Problem: CRRT (Continuous Renal Replacement Therapy)  Goal: Safe, Effective Therapy Delivery  Outcome: Progressing  Goal: Hemodynamic Stability  Outcome: Progressing  Goal: Body Temperature Maintained in Desired Range  Outcome: Progressing  Goal: Absence of Infection Signs and Symptoms  Outcome: Progressing     Problem: Ventricular Assist Device  Goal: Optimal Adjustment to Device  Outcome: Progressing  Goal: Absence of Bleeding  Outcome: Progressing  Goal: Absence of Embolism Signs and Symptoms  Outcome: Progressing  Goal: Optimal Blood Flow  Outcome: Progressing  Goal: Absence of Infection Signs and Symptoms  Outcome: Progressing  Goal: Effective Right-Sided Heart Function  Outcome: Progressing     Problem: Ventricular Assist Device  Goal: Optimal Adjustment to Device  Outcome: Progressing  Goal: Absence of Bleeding  Outcome: Progressing  Goal: Absence of Embolism Signs and Symptoms  Outcome: Progressing  Goal: Optimal Blood Flow  Outcome: Progressing  Goal: Absence of Infection Signs and  Symptoms  Outcome: Progressing  Goal: Effective Right-Sided Heart Function  Outcome: Progressing     Problem: Fall Injury Risk  Goal: Absence of Fall and Fall-Related Injury  Outcome: Progressing     Problem: Hemodialysis  Goal: Safe, Effective Therapy Delivery  Outcome: Progressing  Goal: Effective Tissue Perfusion  Outcome: Progressing  Goal: Absence of Infection Signs and Symptoms  Outcome: Progressing

## 2024-06-15 NOTE — CARE UPDATE
Hemodynamics:   Parameter   at 2000   CVP  14   SvO2 49   CO  6.7   CI 3.3         sodium chloride 0.9%   Intravenous Continuous 200 mL/hr at 06/14/24 2101 Rate Verify at 06/14/24 2101    sodium chloride 0.9%   Intravenous Continuous   Stopped at 06/13/24 0117    bumetanide (BUMEX) 25 mg in 100 mL infusion (conc: 0.25 mg/mL)  1 mg/hr Intravenous Continuous 4 mL/hr at 06/14/24 2101 1 mg/hr at 06/14/24 2101         Recent Labs   Lab 06/14/24  1313   *  129*  129*   K 4.6  4.6  4.6   CL 99  99  99   CO2 18*  18*  18*   BUN 41*  41*  41*   CREATININE 4.0*  4.0*  4.0*   CALCIUM 8.9  8.9  8.9   MG 2.4  2.4     Recent Labs   Lab 06/14/24 0221 06/14/24  1835   WBC 9.81  --    RBC 2.22*  --    HGB 6.2*  --    HCT 20.3* 21*     --    MCV 91  --    MCH 27.9  --    MCHC 30.5*  --      Recent Labs   Lab 06/14/24 0221   INR 3.2*   APTT 53.5*         UOP 24Hours:   Intake/Output Summary (Last 24 hours) at 6/14/2024 2230  Last data filed at 6/14/2024 2201  Gross per 24 hour   Intake 2445.5 ml   Output 2848 ml   Net -402.5 ml       Assessment/Plan:  - No changes.Patient was drowsy at shift change about pain medication was given around 2 hrs prior due to a c/o chest pain. On exam he had chest wall tenderness. Bedside echo w/ no significant changes, no pericardial effusion and a large pleural effussion. ABG done and reviewed. Mentation slowly improved.   - Plan was discussed with on-call attending    Bernardino Brand MD  Cardiovascular Disease PGY-5  [facility]

## 2024-06-15 NOTE — ASSESSMENT & PLAN NOTE
Patient with a baseline eGFR that appears to be variable however was noted to be in the 15-20 range in February before his recent IVÁN in May, presents now with fatigue and worsening IVÁN with a creatinine of 9.8 and eGFR of 5.5. BUN was noted to be elevated at 117, other electrolytes were normal. He was noted to have some asterixis on exam (which he states is chronic for him and unchanged from his baseline), however denies any other signs or symptoms of uremia. Bedside bladder scan revealed 200 mL of urine in his bladder and his last bladder void was 10 hours prior to this consult.     SLED initiated 6/13 x4 hours.  Mental status improved.  6/14:  8 hour SLED overnight, net even.    Recommendations  -Will plan to continue SLED till tomorrow morning, -500 as tolerated.  -electrolytes will be adjusted to current electrolytes  -Avoid nephrotoxic agents (IV contrast, NSAID's, gadolinium contrast, PPI's) if able.   -Strict I's and O's  -RFP q 12 hours while on RRT.  PRN mg/phos replacement  -Renally dose all medications  -will need OP HD upon discharge, recommend CM/SW involvement to start workup.  -will need IR evaluation once out of ICU for TDC placement.

## 2024-06-15 NOTE — PROGRESS NOTES
[facility] - Cardiac Intensive Care  Heart Transplant  Progress Note    Patient Name: Radha Abbott  MRN: [id number]  Admission Date: 6/11/2024  Hospital Length of Stay: 4 days  Attending Physician: Sajan Hurley, *  Primary Care Provider: Vasu Kong MD  Principal Problem:Acute renal failure superimposed on stage 4 chronic kidney disease    Subjective:   Interval History: CVP up this am and pt uncomfortable/delerious. Has not slept well in days. Unable to answer simple questions. Wife at bedside. VBG(unable to get abg) stable.     Continuous Infusions:   sodium chloride 0.9%   Intravenous Continuous 200 mL/hr at 06/15/24 0901 Rate Verify at 06/15/24 0901    sodium chloride 0.9%   Intravenous Continuous   Stopped at 06/13/24 0117     Scheduled Meds:   amiodarone  400 mg Oral Daily    amLODIPine  5 mg Oral Daily    amoxicillin  500 mg Oral Q12H    atorvastatin  40 mg Oral QHS    folic acid  1 mg Oral Daily    hydrALAZINE  100 mg Oral Q8H    LIDOcaine  2 patch Transdermal Q24H    mirtazapine  7.5 mg Oral QHS    pantoprazole  40 mg Oral Daily    venlafaxine  37.5 mg Oral Daily    vitamin D  1,000 Units Oral Daily    warfarin  2.5 mg Oral Daily     PRN Meds:  Current Facility-Administered Medications:     acetaminophen, 650 mg, Oral, Q6H PRN    dextrose 10%, 12.5 g, Intravenous, PRN    dextrose 10%, 25 g, Intravenous, PRN    glucagon (human recombinant), 1 mg, Intramuscular, PRN    glucagon (human recombinant), 1 mg, Intramuscular, PRN    glucose, 16 g, Oral, PRN    glucose, 24 g, Oral, PRN    insulin aspart U-100, 0-5 Units, Subcutaneous, QID (AC + HS) PRN    magnesium sulfate IVPB, 2 g, Intravenous, PRN    simethicone, 1 tablet, Oral, TID PRN    sodium phosphate 20.01 mmol in dextrose 5 % (D5W) 250 mL IVPB, 20.01 mmol, Intravenous, PRN    sodium phosphate 30 mmol in dextrose 5 % (D5W) 250 mL IVPB, 30 mmol, Intravenous, PRN    sodium phosphate 39.99 mmol in dextrose 5 % (D5W) 250 mL IVPB, 39.99 mmol,  Intravenous, PRN    traZODone, 25 mg, Oral, Nightly PRN    Review of patient's allergies indicates:  No Known Allergies  Objective:     Vital Signs (Most Recent):  Temp: 98.3 °F (36.8 °C) (06/15/24 0701)  Pulse: 86 (06/15/24 0901)  Resp: (!) 30 (06/15/24 0903)  BP: (!) 98/7 (06/15/24 0801)  SpO2: 96 % (06/15/24 0901) Vital Signs (24h Range):  Temp:  [97.8 °F (36.6 °C)-98.6 °F (37 °C)] 98.3 °F (36.8 °C)  Pulse:  [72-88] 86  Resp:  [18-44] 30  SpO2:  [60 %-100 %] 96 %  BP: ()/(0-94) 98/7     Patient Vitals for the past 72 hrs (Last 3 readings):   Weight   06/15/24 0501 83.6 kg (184 lb 4.9 oz)   06/14/24 0400 81.5 kg (179 lb 10.8 oz)   06/13/24 0400 80 kg (176 lb 5.9 oz)     Body mass index is 24.32 kg/m².      Intake/Output Summary (Last 24 hours) at 6/15/2024 0942  Last data filed at 6/15/2024 0901  Gross per 24 hour   Intake 3186.28 ml   Output 3425 ml   Net -238.72 ml       Hemodynamic Parameters:       Telemetry: SR       Physical Exam  Constitutional:       Appearance: Normal appearance.   HENT:      Head: Normocephalic and atraumatic.   Eyes:      Conjunctiva/sclera: Conjunctivae normal.      Pupils: Pupils are equal, round, and reactive to light.   Neck:      Comments: RIJ trialysis cath. JVP at earlobe  Cardiovascular:      Rate and Rhythm: Normal rate and regular rhythm.      Comments: Smooth VAD hum  Pulmonary:      Effort: Pulmonary effort is normal.      Breath sounds: Normal breath sounds.   Abdominal:      General: Bowel sounds are normal.      Palpations: Abdomen is soft.   Musculoskeletal:         General: No swelling. Normal range of motion.      Cervical back: Normal range of motion and neck supple.   Skin:     General: Skin is warm and dry.      Capillary Refill: Capillary refill takes 2 to 3 seconds.   Neurological:      General: No focal deficit present.      Mental Status: He is alert and oriented to person, place, and time.            Significant Labs:  CBC:  Recent Labs   Lab  06/13/24  0349 06/14/24  0221 06/14/24  1835 06/15/24  0234 06/15/24  0810   WBC 9.06 9.81  --  11.96  --    RBC 2.27* 2.22*  --  2.21*  --    HGB 6.5* 6.2*  --  6.3*  --    HCT 20.4* 20.3* 21* 19.5* 21*    312  --  364  --    MCV 90 91  --  88  --    MCH 28.6 27.9  --  28.5  --    MCHC 31.9* 30.5*  --  32.3  --      BNP:  Recent Labs   Lab 06/11/24  1933 06/14/24  0221   BNP 1,388* 1,542*     CMP:  Recent Labs   Lab 06/12/24 0345 06/12/24  1422 06/14/24  0221 06/14/24  1313 06/14/24  2212 06/15/24  0234   GLU 32*   < > 129*  129*  129* 117*  117*  117* 81  81  81 77  77  77  77   CALCIUM 8.9   < > 9.0  9.0  9.0 8.9  8.9  8.9 8.8  8.8  8.8 8.9  8.9  8.9  8.9   ALBUMIN 2.5*   < > 2.5*  2.5*  2.5* 2.4*  2.4*  2.4* 2.5*  2.5*  2.5* 2.5*  2.5*  2.5*  2.5*   PROT 7.5  --  7.6  --   --  7.8      < > 129*  129*  129* 129*  129*  129* 130*  130*  130* 129*  129*  129*  129*   K 3.9   < > 4.7  4.7  4.7 4.6  4.6  4.6 4.6  4.6  4.6 4.6  4.6  4.6  4.6   CO2 20*   < > 17*  17*  17* 18*  18*  18* 18*  18*  18* 18*  18*  18*  18*   CL 99   < > 100  100  100 99  99  99 100  100  100 99  99  99  99   *   < > 30*  30*  30* 41*  41*  41* 18  18  18 11  11  11  11   CREATININE 9.5*   < > 3.3*  3.3*  3.3* 4.0*  4.0*  4.0* 1.8*  1.8*  1.8* 1.3  1.3  1.3  1.3   ALKPHOS 148*  --  165*  --   --  253*   ALT 67*  --  65*  --   --  148*   AST 73*  --  64*  --   --  207*   BILITOT 0.2  --  0.4  --   --  0.7    < > = values in this interval not displayed.      Coagulation:   Recent Labs   Lab 06/13/24  0349 06/14/24  0221 06/15/24  0234   INR 2.8* 3.2* 3.4*   APTT 48.4* 53.5* 50.4*     LDH:  Recent Labs   Lab 06/13/24  0349 06/14/24  0221 06/14/24  0648 06/15/24  0234   * 506* 734* 817*     Microbiology:  Microbiology Results (last 7 days)       Procedure Component Value Units Date/Time    Blood culture #1 **CANNOT BE ORDERED STAT**  [[id number]] Collected: 06/11/24 2122    Order Status: Completed Specimen: Blood from Peripheral, Antecubital, Right Updated: 06/14/24 2212     Blood Culture, Routine No Growth to date      No Growth to date      No Growth to date      No Growth to date    Blood culture #2 **CANNOT BE ORDERED STAT** [[id number]] Collected: 06/11/24 2121    Order Status: Completed Specimen: Blood from Peripheral, Hand, Left Updated: 06/14/24 2212     Blood Culture, Routine No Growth to date      No Growth to date      No Growth to date      No Growth to date    Urine culture [[id number]] Collected: 06/12/24 1422    Order Status: Completed Specimen: Urine Updated: 06/13/24 2113     Urine Culture, Routine No growth    Narrative:      Specimen Source->Urine            I have reviewed all pertinent labs within the past 24 hours.    Estimated Creatinine Clearance: 66.6 mL/min (based on SCr of 1.3 mg/dL).    Diagnostic Results:  I have reviewed and interpreted all pertinent imaging results/findings within the past 24 hours.  Assessment and Plan:     Mr. Radha Abbott is a 63 yo male with stage D HFrEF, ICMP who underwent HM3 placement as DT (12/1/23) w/ a hospital stay complicated by vasoplegia(requiring Giaprezza), debility, malnutrition/impaired swallowing, and renal failure requiring HD (since weaned off) w/ recent discharge for ADHF presented to the ED with wife due to c/o worsening generalized weakness since 6/10. Per wife patient was mostly sleeping during the day despite sleeping 10 hrs at night. Patient also admits decreased appetite and decreased urine output despite taking his medications as prescribed. He has not urinated since 6/11 at 2pm. He has chronic shortness of breath on exertion. Denies confusion, syncope, diarrhea, constipation, N/V, dysuria, or other complaints. Patient had his prior tunnel cath removed used for HD about 2 weeks ago. He completed a 6 week therapy for Enterococcus bacteremia with ampicillin and rocephin  ending 5/30/24a and was switched to PO antibiotics.     On prior hospital stay nephrology consulted during hospital stay due to elevated renal function. They feel he is close to being a dialysis patient. He diuresed on IV Bumex with prn Metolazone. He is net negative 1.2L throughout his hospital stay and weight on day of discharge was 179lbs down from 186lbs on admit. We continued his home dose of Bumex 4mg BID but increased his prn Metolazone to 10mg. He received a dose of Epo while inpatient and he has plans to follow up with Heme/Onc to get outpatient therapy arranged.      2D Echo with CFD done on 3/26/2024  LVAD: There is a Heartmate III LVAD running at 5000 RPM. The aortic valve does not open. The ventricular septum is at midline. Inflow cannula well seated at the apex. Outflow graft not visualized.   Left Ventricle: The left ventricle is moderately dilated. Normal wall thickness. Global hypokinesis present. Septal motion is abnormal. There is severely reduced systolic function with a visually estimated ejection fraction of 5 - 10%. There is diastolic dysfunction but grade cannot be determined.   Right Ventricle: Mild right ventricular enlargement. Wall thickness is normal. Right ventricle wall motion is normal. Systolic function is normal. Pacemaker lead present in the ventricle.   Left Atrium: Left atrium is severely dilated.   Right Atrium: Right atrium is moderately dilated.   Mitral Valve: The mitral valve is repaired with an Noble stitch. There is mild regurgitation.   IVC/SVC: Normal venous pressure at 3 mmHg.              * Acute renal failure superimposed on stage 4 chronic kidney disease  Mr. Radha Abbott is a 65 yo male with stage D HFrEF, ICMP who underwent HM3 placement as DT, DM2, CKD4, HTN, E.faecalis bacteremia who presented due to c/o worsening generalized weakness since 6/10. Admitted for worsening renal function with a BUN>100/Cr. 9.9.     - CT Ab/pel: Kidneys/ Ureters: Normal in size and  location.  Mild bilateral perinephric stranding, a nonspecific finding and similar to prior.  No hydronephrosis or nephrolithiasis. No ureteral dilatation.  - Nephrology consulted, appreciate recs.   -Continues on CRRt with UF increased to 500 this am with elevated CVP.   -Will stop bumex with no UO.   - Will need OP HD upon discharge, recommend CM/SW involvement to start workup.  - Will need IR evaluation once out of ICU for TDC placement.  - Renally dosing medications      LVAD (left ventricular assist device) present  -HeartMate 3 Implanted  12/1/2023  as DT  -Cont Coumadin, dosing by PharmD.  Goal INR 2.0-3.0 . Supra therapeutic today.   -Antiplatelets Not on ASA  -LDH is stable overall today. Will continue to monitor daily.  -Speed set at  5000     -Interrogation notable for no events  -Not listed for OHTx, declined for OHTX due to comorbidities     Procedure: Device Interrogation Including analysis of device parameters  Current Settings: Ventricular Assist Device  Review of device function is stable        6/15/2024     6:01 AM 6/15/2024     5:01 AM 6/15/2024     4:01 AM 6/15/2024     3:01 AM 6/15/2024     2:01 AM 6/15/2024     1:01 AM 6/15/2024    12:01 AM   TXP LVAD INTERROGATIONS   Type HeartMate3 HeartMate3 HeartMate3 HeartMate3 HeartMate3 HeartMate3 HeartMate3   Flow 4.6 4.5 4..2 4.1 4.2 4.2 4.4   Speed 5000 5000 5000 5000 5000 5000 5000   PI 3.3 3.2 4.2 4.5 4.2 3.8 3.5   Power ([place]) 3.4 3.4 3.2 3.3 3.2 3.5 3.4   LSL 4600 4600 4600 4600 4600 4600 4600   Pulsatility Intermittent pulse Intermittent pulse Intermittent pulse Intermittent pulse Intermittent pulse Intermittent pulse Intermittent pulse         Acute on chronic combined systolic and diastolic heart failure  Owing to his advanced kidney disease and significantly elevated creat, he is not on a  ACEI/ARB/ARNI or MRA. He is also not on a beta blocker due to RV dysfunction.     Plan:  - See LVAD  - Se IVÁN    Hypoglycemia  -Endocrine following. D10W  gtt D/C'd    Bacteremia due to Enterococcus  He completed a 6 week therapy for Enterococcus bacteremia with ampicillin and rocephin ending 5/30/24a and was switched to Amoxicillin for suppression    Anticoagulant long-term use  Cont warfarin    Anemia  Chronic with no acute bleeding, multifactorial 2/2 CKD4 and chronic anticoagulation    -Hgb 6.2, normally in the 6's - 7's, likely dilutional  -Iron panel done. Iron deficient but Ferritin is elevated at 1186  -Continue to trend    Adjustment disorder with depressed mood  Cont SSRI    PAF (paroxysmal atrial fibrillation)  -Continue amiodarone and Coumadin      Type 2 diabetes mellitus without complication, without long-term current use of insulin  -Endocrine    CAD (coronary artery disease)  -Continue statin      Uninterrupted Critical Care/Counseling Time (not including procedures): 60mn  [place] NP  Heart Transplant  [facility] - Cardiac Intensive [facility]

## 2024-06-15 NOTE — ASSESSMENT & PLAN NOTE
Mr. Radha Abbott is a 65 yo male with stage D HFrEF, ICMP who underwent HM3 placement as DT, DM2, CKD4, HTN, E.faecalis bacteremia who presented due to c/o worsening generalized weakness since 6/10. Admitted for worsening renal function with a BUN>100/Cr. 9.9.     - CT Ab/pel: Kidneys/ Ureters: Normal in size and location.  Mild bilateral perinephric stranding, a nonspecific finding and similar to prior.  No hydronephrosis or nephrolithiasis. No ureteral dilatation.  - Nephrology consulted, appreciate recs.   -Continues on CRRt with UF increased to 500 this am with elevated CVP.   -Will stop bumex with no UO.   - Will need OP HD upon discharge, recommend CM/SW involvement to start workup.  - Will need IR evaluation once out of ICU for TDC placement.  - Renally dosing medications

## 2024-06-15 NOTE — SUBJECTIVE & OBJECTIVE
Interval History: Seen at bedside with SLED going on, CVP 18 this AM, agitated and restless, on 2 liter oxygen via nasal cannula,anuric, net -800ml/24h.    Review of patient's allergies indicates:  No Known Allergies  Current Facility-Administered Medications   Medication Frequency    0.9%  NaCl infusion (CRRT USE ONLY) Continuous    0.9%  NaCl infusion Continuous    acetaminophen tablet 650 mg Q6H PRN    amiodarone tablet 400 mg Daily    amLODIPine tablet 5 mg Daily    amoxicillin capsule 500 mg Q12H    atorvastatin tablet 40 mg QHS    dextrose 10% bolus 125 mL 125 mL PRN    dextrose 10% bolus 250 mL 250 mL PRN    folic acid tablet 1 mg Daily    glucagon (human recombinant) injection 1 mg PRN    glucagon (human recombinant) injection 1 mg PRN    glucose chewable tablet 16 g PRN    glucose chewable tablet 24 g PRN    hydrALAZINE tablet 100 mg Q8H    insulin aspart U-100 pen 0-5 Units QID (AC + HS) PRN    LIDOcaine 5 % patch 2 patch Q24H    magnesium sulfate 2g in water 50mL IVPB (premix) PRN    morphine injection 0.5 mg Q4H PRN    pantoprazole EC tablet 40 mg Daily    QUEtiapine tablet 25 mg QHS    simethicone chewable tablet 80 mg TID PRN    sodium phosphate 20.01 mmol in dextrose 5 % (D5W) 250 mL IVPB PRN    sodium phosphate 30 mmol in dextrose 5 % (D5W) 250 mL IVPB PRN    sodium phosphate 39.99 mmol in dextrose 5 % (D5W) 250 mL IVPB PRN    trazodone split tablet 25 mg Nightly PRN    venlafaxine tablet 37.5 mg Daily    vitamin D 1000 units tablet 1,000 Units Daily    [START ON 6/16/2024] warfarin (COUMADIN) tablet 2.5 mg Daily       Objective:     Vital Signs (Most Recent):  Temp: 97.1 °F (36.2 °C) (06/15/24 1101)  Pulse: 86 (06/15/24 1301)  Resp: (!) 22 (06/15/24 1301)  BP: (!) 98/7 (06/15/24 1127)  SpO2: 100 % (06/15/24 1301) Vital Signs (24h Range):  Temp:  [97.1 °F (36.2 °C)-98.6 °F (37 °C)] 97.1 °F (36.2 °C)  Pulse:  [72-89] 86  Resp:  [18-44] 22  SpO2:  [60 %-100 %] 100 %  BP: ()/(0-94) 98/7      Weight: 79.8 kg (176 lb) (06/15/24 1127)  Body mass index is 23.22 kg/m².  Body surface area is 2.03 meters squared.    I/O last 3 completed shifts:  In: 4833.3 [P.O.:250; I.V.:4583.3]  Out: 5983 [Urine:30; Other:5953]     Physical Exam  Constitutional:       General: He is not in acute distress.     Appearance: He is ill-appearing.   Pulmonary:      Effort: Pulmonary effort is normal. No respiratory distress.      Breath sounds: Rales present. No wheezing.   Musculoskeletal:      Right lower leg: Edema present.      Left lower leg: Edema present.   Skin:     General: Skin is warm.   Neurological:      Mental Status: He is alert.   Psychiatric:      Comments: Agitated, restless          Significant Labs:  BMP:   Recent Labs   Lab 06/15/24  0234   GLU 77  77  77  77   *  129*  129*  129*   K 4.6  4.6  4.6  4.6   CL 99  99  99  99   CO2 18*  18*  18*  18*   BUN 11  11  11  11   CREATININE 1.3  1.3  1.3  1.3   CALCIUM 8.9  8.9  8.9  8.9   MG 2.0  2.0  2.0  2.0     CBC:   Recent Labs   Lab 06/15/24  0234 06/15/24  0810 06/15/24  1247   WBC 11.96  --   --    RBC 2.21*  --   --    HGB 6.3*  --   --    HCT 19.5*   < > 22*     --   --    MCV 88  --   --    MCH 28.5  --   --    MCHC 32.3  --   --     < > = values in this interval not displayed.        Significant Imaging:  Labs: Reviewed  ECG: Reviewed  X-Ray: Reviewed  US: Reviewed

## 2024-06-15 NOTE — SUBJECTIVE & OBJECTIVE
Interval History: CVP up this am and pt uncomfortable/delerious. Has not slept well in days. Unable to answer simple questions. Wife at bedside. VBG(unable to get abg) stable.     Continuous Infusions:   sodium chloride 0.9%   Intravenous Continuous 200 mL/hr at 06/15/24 0901 Rate Verify at 06/15/24 0901    sodium chloride 0.9%   Intravenous Continuous   Stopped at 06/13/24 0117     Scheduled Meds:   amiodarone  400 mg Oral Daily    amLODIPine  5 mg Oral Daily    amoxicillin  500 mg Oral Q12H    atorvastatin  40 mg Oral QHS    folic acid  1 mg Oral Daily    hydrALAZINE  100 mg Oral Q8H    LIDOcaine  2 patch Transdermal Q24H    mirtazapine  7.5 mg Oral QHS    pantoprazole  40 mg Oral Daily    venlafaxine  37.5 mg Oral Daily    vitamin D  1,000 Units Oral Daily    warfarin  2.5 mg Oral Daily     PRN Meds:  Current Facility-Administered Medications:     acetaminophen, 650 mg, Oral, Q6H PRN    dextrose 10%, 12.5 g, Intravenous, PRN    dextrose 10%, 25 g, Intravenous, PRN    glucagon (human recombinant), 1 mg, Intramuscular, PRN    glucagon (human recombinant), 1 mg, Intramuscular, PRN    glucose, 16 g, Oral, PRN    glucose, 24 g, Oral, PRN    insulin aspart U-100, 0-5 Units, Subcutaneous, QID (AC + HS) PRN    magnesium sulfate IVPB, 2 g, Intravenous, PRN    simethicone, 1 tablet, Oral, TID PRN    sodium phosphate 20.01 mmol in dextrose 5 % (D5W) 250 mL IVPB, 20.01 mmol, Intravenous, PRN    sodium phosphate 30 mmol in dextrose 5 % (D5W) 250 mL IVPB, 30 mmol, Intravenous, PRN    sodium phosphate 39.99 mmol in dextrose 5 % (D5W) 250 mL IVPB, 39.99 mmol, Intravenous, PRN    traZODone, 25 mg, Oral, Nightly PRN    Review of patient's allergies indicates:  No Known Allergies  Objective:     Vital Signs (Most Recent):  Temp: 98.3 °F (36.8 °C) (06/15/24 0701)  Pulse: 86 (06/15/24 0901)  Resp: (!) 30 (06/15/24 0903)  BP: (!) 98/7 (06/15/24 0801)  SpO2: 96 % (06/15/24 0901) Vital Signs (24h Range):  Temp:  [97.8 °F (36.6 °C)-98.6  °F (37 °C)] 98.3 °F (36.8 °C)  Pulse:  [72-88] 86  Resp:  [18-44] 30  SpO2:  [60 %-100 %] 96 %  BP: ()/(0-94) 98/7     Patient Vitals for the past 72 hrs (Last 3 readings):   Weight   06/15/24 0501 83.6 kg (184 lb 4.9 oz)   06/14/24 0400 81.5 kg (179 lb 10.8 oz)   06/13/24 0400 80 kg (176 lb 5.9 oz)     Body mass index is 24.32 kg/m².      Intake/Output Summary (Last 24 hours) at 6/15/2024 0942  Last data filed at 6/15/2024 0901  Gross per 24 hour   Intake 3186.28 ml   Output 3425 ml   Net -238.72 ml       Hemodynamic Parameters:       Telemetry: SR       Physical Exam  Constitutional:       Appearance: Normal appearance.   HENT:      Head: Normocephalic and atraumatic.   Eyes:      Conjunctiva/sclera: Conjunctivae normal.      Pupils: Pupils are equal, round, and reactive to light.   Neck:      Comments: RIJ trialysis cath. JVP at earlobe  Cardiovascular:      Rate and Rhythm: Normal rate and regular rhythm.      Comments: Smooth VAD hum  Pulmonary:      Effort: Pulmonary effort is normal.      Breath sounds: Normal breath sounds.   Abdominal:      General: Bowel sounds are normal.      Palpations: Abdomen is soft.   Musculoskeletal:         General: No swelling. Normal range of motion.      Cervical back: Normal range of motion and neck supple.   Skin:     General: Skin is warm and dry.      Capillary Refill: Capillary refill takes 2 to 3 seconds.   Neurological:      General: No focal deficit present.      Mental Status: He is alert and oriented to person, place, and time.            Significant Labs:  CBC:  Recent Labs   Lab 06/13/24  0349 06/14/24  0221 06/14/24  1835 06/15/24  0234 06/15/24  0810   WBC 9.06 9.81  --  11.96  --    RBC 2.27* 2.22*  --  2.21*  --    HGB 6.5* 6.2*  --  6.3*  --    HCT 20.4* 20.3* 21* 19.5* 21*    312  --  364  --    MCV 90 91  --  88  --    MCH 28.6 27.9  --  28.5  --    MCHC 31.9* 30.5*  --  32.3  --      BNP:  Recent Labs   Lab 06/11/24 1933 06/14/24  0221   BNP  1,388* 1,542*     CMP:  Recent Labs   Lab 06/12/24  0345 06/12/24  1422 06/14/24  0221 06/14/24  1313 06/14/24  2212 06/15/24  0234   GLU 32*   < > 129*  129*  129* 117*  117*  117* 81  81  81 77  77  77  77   CALCIUM 8.9   < > 9.0  9.0  9.0 8.9  8.9  8.9 8.8  8.8  8.8 8.9  8.9  8.9  8.9   ALBUMIN 2.5*   < > 2.5*  2.5*  2.5* 2.4*  2.4*  2.4* 2.5*  2.5*  2.5* 2.5*  2.5*  2.5*  2.5*   PROT 7.5  --  7.6  --   --  7.8      < > 129*  129*  129* 129*  129*  129* 130*  130*  130* 129*  129*  129*  129*   K 3.9   < > 4.7  4.7  4.7 4.6  4.6  4.6 4.6  4.6  4.6 4.6  4.6  4.6  4.6   CO2 20*   < > 17*  17*  17* 18*  18*  18* 18*  18*  18* 18*  18*  18*  18*   CL 99   < > 100  100  100 99  99  99 100  100  100 99  99  99  99   *   < > 30*  30*  30* 41*  41*  41* 18  18  18 11  11  11  11   CREATININE 9.5*   < > 3.3*  3.3*  3.3* 4.0*  4.0*  4.0* 1.8*  1.8*  1.8* 1.3  1.3  1.3  1.3   ALKPHOS 148*  --  165*  --   --  253*   ALT 67*  --  65*  --   --  148*   AST 73*  --  64*  --   --  207*   BILITOT 0.2  --  0.4  --   --  0.7    < > = values in this interval not displayed.      Coagulation:   Recent Labs   Lab 06/13/24  0349 06/14/24  0221 06/15/24  0234   INR 2.8* 3.2* 3.4*   APTT 48.4* 53.5* 50.4*     LDH:  Recent Labs   Lab 06/13/24  0349 06/14/24  0221 06/14/24  0648 06/15/24  0234   * 506* 734* 817*     Microbiology:  Microbiology Results (last 7 days)       Procedure Component Value Units Date/Time    Blood culture #1 **CANNOT BE ORDERED STAT** [[id number]] Collected: 06/11/24 2122    Order Status: Completed Specimen: Blood from Peripheral, Antecubital, Right Updated: 06/14/24 2212     Blood Culture, Routine No Growth to date      No Growth to date      No Growth to date      No Growth to date    Blood culture #2 **CANNOT BE ORDERED STAT** [[id number]] Collected: 06/11/24 2121    Order Status: Completed Specimen: Blood from  Peripheral, Hand, Left Updated: 06/14/24 2212     Blood Culture, Routine No Growth to date      No Growth to date      No Growth to date      No Growth to date    Urine culture [[id number]] Collected: 06/12/24 1422    Order Status: Completed Specimen: Urine Updated: 06/13/24 2113     Urine Culture, Routine No growth    Narrative:      Specimen Source->Urine            I have reviewed all pertinent labs within the past 24 hours.    Estimated Creatinine Clearance: 66.6 mL/min (based on SCr of 1.3 mg/dL).    Diagnostic Results:  I have reviewed and interpreted all pertinent imaging results/findings within the past 24 hours.

## 2024-06-16 PROBLEM — R41.0 DELIRIUM: Status: ACTIVE | Noted: 2024-06-16

## 2024-06-16 LAB
ALBUMIN SERPL BCP-MCNC: 2.5 G/DL (ref 3.5–5.2)
ALLENS TEST: ABNORMAL
ALLENS TEST: NORMAL
ANION GAP SERPL CALC-SCNC: 12 MMOL/L (ref 8–16)
ANION GAP SERPL CALC-SCNC: 12 MMOL/L (ref 8–16)
ANION GAP SERPL CALC-SCNC: 13 MMOL/L (ref 8–16)
APTT PPP: 43.9 SEC (ref 21–32)
BACTERIA BLD CULT: NORMAL
BACTERIA BLD CULT: NORMAL
BASOPHILS # BLD AUTO: 0.02 K/UL (ref 0–0.2)
BASOPHILS NFR BLD: 0.1 % (ref 0–1.9)
BUN SERPL-MCNC: 13 MG/DL (ref 8–23)
BUN SERPL-MCNC: 6 MG/DL (ref 8–23)
BUN SERPL-MCNC: 6 MG/DL (ref 8–23)
CALCIUM SERPL-MCNC: 8.6 MG/DL (ref 8.7–10.5)
CALCIUM SERPL-MCNC: 8.7 MG/DL (ref 8.7–10.5)
CALCIUM SERPL-MCNC: 8.7 MG/DL (ref 8.7–10.5)
CHLORIDE SERPL-SCNC: 95 MMOL/L (ref 95–110)
CHLORIDE SERPL-SCNC: 96 MMOL/L (ref 95–110)
CHLORIDE SERPL-SCNC: 96 MMOL/L (ref 95–110)
CO2 SERPL-SCNC: 22 MMOL/L (ref 23–29)
CREAT SERPL-MCNC: 0.8 MG/DL (ref 0.5–1.4)
CREAT SERPL-MCNC: 0.8 MG/DL (ref 0.5–1.4)
CREAT SERPL-MCNC: 1.6 MG/DL (ref 0.5–1.4)
DELSYS: ABNORMAL
DELSYS: NORMAL
DIFFERENTIAL METHOD BLD: ABNORMAL
EOSINOPHIL # BLD AUTO: 0 K/UL (ref 0–0.5)
EOSINOPHIL NFR BLD: 0.1 % (ref 0–8)
ERYTHROCYTE [DISTWIDTH] IN BLOOD BY AUTOMATED COUNT: 17.2 % (ref 11.5–14.5)
ERYTHROCYTE [SEDIMENTATION RATE] IN BLOOD BY WESTERGREN METHOD: 24 MM/H
ERYTHROCYTE [SEDIMENTATION RATE] IN BLOOD BY WESTERGREN METHOD: 24 MM/H
ERYTHROCYTE [SEDIMENTATION RATE] IN BLOOD BY WESTERGREN METHOD: 27 MM/H
EST. GFR  (NO RACE VARIABLE): 48.4 ML/MIN/1.73 M^2
EST. GFR  (NO RACE VARIABLE): >60 ML/MIN/1.73 M^2
EST. GFR  (NO RACE VARIABLE): >60 ML/MIN/1.73 M^2
FIO2: 40
FLOW: 3
FLOW: 5
FLOW: 5
GLUCOSE SERPL-MCNC: 54 MG/DL (ref 70–110)
GLUCOSE SERPL-MCNC: 54 MG/DL (ref 70–110)
GLUCOSE SERPL-MCNC: 77 MG/DL (ref 70–110)
HCO3 UR-SCNC: 25 MMOL/L (ref 24–28)
HCT VFR BLD AUTO: 20.7 % (ref 40–54)
HGB BLD-MCNC: 6.6 G/DL (ref 14–18)
IMM GRANULOCYTES # BLD AUTO: 0.09 K/UL (ref 0–0.04)
IMM GRANULOCYTES NFR BLD AUTO: 0.7 % (ref 0–0.5)
INR PPP: 2.6 (ref 0.8–1.2)
LDH SERPL L TO P-CCNC: 0.81 MMOL/L (ref 0.5–2.2)
LDH SERPL L TO P-CCNC: 939 U/L (ref 110–260)
LYMPHOCYTES # BLD AUTO: 0.4 K/UL (ref 1–4.8)
LYMPHOCYTES NFR BLD: 2.7 % (ref 18–48)
MAGNESIUM SERPL-MCNC: 1.9 MG/DL (ref 1.6–2.6)
MCH RBC QN AUTO: 27.7 PG (ref 27–31)
MCHC RBC AUTO-ENTMCNC: 31.9 G/DL (ref 32–36)
MCV RBC AUTO: 87 FL (ref 82–98)
MODE: ABNORMAL
MODE: ABNORMAL
MODE: NORMAL
MONOCYTES # BLD AUTO: 0.8 K/UL (ref 0.3–1)
MONOCYTES NFR BLD: 5.6 % (ref 4–15)
NEUTROPHILS # BLD AUTO: 12.3 K/UL (ref 1.8–7.7)
NEUTROPHILS NFR BLD: 90.8 % (ref 38–73)
NRBC BLD-RTO: 0 /100 WBC
PCO2 BLDA: 36.7 MMHG (ref 35–45)
PH SMN: 7.44 [PH] (ref 7.35–7.45)
PHOSPHATE SERPL-MCNC: 2.3 MG/DL (ref 2.7–4.5)
PHOSPHATE SERPL-MCNC: <1 MG/DL (ref 2.7–4.5)
PHOSPHATE SERPL-MCNC: <1 MG/DL (ref 2.7–4.5)
PLATELET # BLD AUTO: 377 K/UL (ref 150–450)
PMV BLD AUTO: 9.4 FL (ref 9.2–12.9)
PO2 BLDA: 24 MMHG (ref 40–60)
PO2 BLDA: 24 MMHG (ref 40–60)
PO2 BLDA: 31 MMHG (ref 40–60)
POC BE: 1 MMOL/L
POC SATURATED O2: 46 % (ref 95–100)
POC SATURATED O2: 47 % (ref 95–100)
POC SATURATED O2: 63 % (ref 95–100)
POC TCO2: 26 MMOL/L (ref 24–29)
POCT GLUCOSE: 67 MG/DL (ref 70–110)
POCT GLUCOSE: 73 MG/DL (ref 70–110)
POCT GLUCOSE: 78 MG/DL (ref 70–110)
POCT GLUCOSE: 82 MG/DL (ref 70–110)
POCT GLUCOSE: 98 MG/DL (ref 70–110)
POTASSIUM SERPL-SCNC: 3.5 MMOL/L (ref 3.5–5.1)
POTASSIUM SERPL-SCNC: 4 MMOL/L (ref 3.5–5.1)
POTASSIUM SERPL-SCNC: 4 MMOL/L (ref 3.5–5.1)
PROTHROMBIN TIME: 26.7 SEC (ref 9–12.5)
RBC # BLD AUTO: 2.38 M/UL (ref 4.6–6.2)
SAMPLE: ABNORMAL
SAMPLE: NORMAL
SITE: ABNORMAL
SITE: NORMAL
SODIUM SERPL-SCNC: 130 MMOL/L (ref 136–145)
SP02: 100
SP02: 100
WBC # BLD AUTO: 13.49 K/UL (ref 3.9–12.7)

## 2024-06-16 PROCEDURE — 83605 ASSAY OF LACTIC ACID: CPT

## 2024-06-16 PROCEDURE — 25000003 PHARM REV CODE 250: Performed by: STUDENT IN AN ORGANIZED HEALTH CARE EDUCATION/TRAINING PROGRAM

## 2024-06-16 PROCEDURE — 99292 CRITICAL CARE ADDL 30 MIN: CPT | Mod: ,,, | Performed by: INTERNAL MEDICINE

## 2024-06-16 PROCEDURE — 63600175 PHARM REV CODE 636 W HCPCS: Performed by: STUDENT IN AN ORGANIZED HEALTH CARE EDUCATION/TRAINING PROGRAM

## 2024-06-16 PROCEDURE — 85025 COMPLETE CBC W/AUTO DIFF WBC: CPT | Performed by: STUDENT IN AN ORGANIZED HEALTH CARE EDUCATION/TRAINING PROGRAM

## 2024-06-16 PROCEDURE — 27000221 HC OXYGEN, UP TO 24 HOURS

## 2024-06-16 PROCEDURE — 99233 SBSQ HOSP IP/OBS HIGH 50: CPT | Mod: ,,, | Performed by: STUDENT IN AN ORGANIZED HEALTH CARE EDUCATION/TRAINING PROGRAM

## 2024-06-16 PROCEDURE — 21400001 HC TELEMETRY ROOM

## 2024-06-16 PROCEDURE — 25000003 PHARM REV CODE 250: Performed by: PHYSICIAN ASSISTANT

## 2024-06-16 PROCEDURE — 93750 INTERROGATION VAD IN PERSON: CPT | Mod: ,,, | Performed by: INTERNAL MEDICINE

## 2024-06-16 PROCEDURE — 82803 BLOOD GASES ANY COMBINATION: CPT

## 2024-06-16 PROCEDURE — 80069 RENAL FUNCTION PANEL: CPT | Performed by: STUDENT IN AN ORGANIZED HEALTH CARE EDUCATION/TRAINING PROGRAM

## 2024-06-16 PROCEDURE — 25000003 PHARM REV CODE 250: Performed by: NURSE PRACTITIONER

## 2024-06-16 PROCEDURE — 83735 ASSAY OF MAGNESIUM: CPT | Mod: 91 | Performed by: INTERNAL MEDICINE

## 2024-06-16 PROCEDURE — 99232 SBSQ HOSP IP/OBS MODERATE 35: CPT | Mod: ,,, | Performed by: PHYSICIAN ASSISTANT

## 2024-06-16 PROCEDURE — 27000248 HC VAD-ADDITIONAL DAY

## 2024-06-16 PROCEDURE — 85610 PROTHROMBIN TIME: CPT | Performed by: STUDENT IN AN ORGANIZED HEALTH CARE EDUCATION/TRAINING PROGRAM

## 2024-06-16 PROCEDURE — 80048 BASIC METABOLIC PNL TOTAL CA: CPT | Performed by: INTERNAL MEDICINE

## 2024-06-16 PROCEDURE — 85730 THROMBOPLASTIN TIME PARTIAL: CPT | Performed by: STUDENT IN AN ORGANIZED HEALTH CARE EDUCATION/TRAINING PROGRAM

## 2024-06-16 PROCEDURE — 99291 CRITICAL CARE FIRST HOUR: CPT | Mod: ,,, | Performed by: INTERNAL MEDICINE

## 2024-06-16 PROCEDURE — 83615 LACTATE (LD) (LDH) ENZYME: CPT | Performed by: STUDENT IN AN ORGANIZED HEALTH CARE EDUCATION/TRAINING PROGRAM

## 2024-06-16 PROCEDURE — 83735 ASSAY OF MAGNESIUM: CPT | Performed by: STUDENT IN AN ORGANIZED HEALTH CARE EDUCATION/TRAINING PROGRAM

## 2024-06-16 PROCEDURE — 84100 ASSAY OF PHOSPHORUS: CPT | Performed by: INTERNAL MEDICINE

## 2024-06-16 PROCEDURE — 94761 N-INVAS EAR/PLS OXIMETRY MLT: CPT

## 2024-06-16 PROCEDURE — 99900035 HC TECH TIME PER 15 MIN (STAT)

## 2024-06-16 PROCEDURE — 99291 CRITICAL CARE FIRST HOUR: CPT | Mod: FS,,, | Performed by: NURSE PRACTITIONER

## 2024-06-16 RX ORDER — MAGNESIUM SULFATE HEPTAHYDRATE 40 MG/ML
2 INJECTION, SOLUTION INTRAVENOUS ONCE
Status: COMPLETED | OUTPATIENT
Start: 2024-06-16 | End: 2024-06-16

## 2024-06-16 RX ORDER — LORAZEPAM 2 MG/ML
1 INJECTION INTRAMUSCULAR ONCE
Status: COMPLETED | OUTPATIENT
Start: 2024-06-16 | End: 2024-06-16

## 2024-06-16 RX ORDER — DEXMEDETOMIDINE HYDROCHLORIDE 4 UG/ML
0-1.4 INJECTION, SOLUTION INTRAVENOUS CONTINUOUS
Status: DISCONTINUED | OUTPATIENT
Start: 2024-06-16 | End: 2024-06-19

## 2024-06-16 RX ORDER — POTASSIUM CHLORIDE 14.9 MG/ML
20 INJECTION INTRAVENOUS ONCE
Status: COMPLETED | OUTPATIENT
Start: 2024-06-16 | End: 2024-06-16

## 2024-06-16 RX ORDER — HALOPERIDOL 5 MG/ML
5 INJECTION INTRAMUSCULAR ONCE
Status: COMPLETED | OUTPATIENT
Start: 2024-06-16 | End: 2024-06-16

## 2024-06-16 RX ORDER — SODIUM CHLORIDE 9 MG/ML
INJECTION, SOLUTION INTRAVENOUS
Status: DISCONTINUED | OUTPATIENT
Start: 2024-06-16 | End: 2024-07-01 | Stop reason: HOSPADM

## 2024-06-16 RX ORDER — DEXTROSE MONOHYDRATE 100 MG/ML
INJECTION, SOLUTION INTRAVENOUS CONTINUOUS
Status: DISCONTINUED | OUTPATIENT
Start: 2024-06-16 | End: 2024-06-18

## 2024-06-16 RX ORDER — POTASSIUM CHLORIDE 20 MEQ/1
40 TABLET, EXTENDED RELEASE ORAL ONCE
Status: DISCONTINUED | OUTPATIENT
Start: 2024-06-16 | End: 2024-06-16

## 2024-06-16 RX ADMIN — SODIUM PHOSPHATE, MONOBASIC, MONOHYDRATE AND SODIUM PHOSPHATE, DIBASIC, ANHYDROUS 30 MMOL: 142; 276 INJECTION, SOLUTION INTRAVENOUS at 06:06

## 2024-06-16 RX ADMIN — HALOPERIDOL LACTATE 5 MG: 5 INJECTION, SOLUTION INTRAMUSCULAR at 01:06

## 2024-06-16 RX ADMIN — DEXTROSE MONOHYDRATE: 100 INJECTION, SOLUTION INTRAVENOUS at 11:06

## 2024-06-16 RX ADMIN — AMPICILLIN 2 G: 2 INJECTION, POWDER, FOR SOLUTION INTRAMUSCULAR; INTRAVENOUS at 11:06

## 2024-06-16 RX ADMIN — SODIUM CHLORIDE: 9 INJECTION, SOLUTION INTRAVENOUS at 09:06

## 2024-06-16 RX ADMIN — POTASSIUM CHLORIDE 20 MEQ: 200 INJECTION, SOLUTION INTRAVENOUS at 09:06

## 2024-06-16 RX ADMIN — DEXMEDETOMIDINE HYDROCHLORIDE 0.2 MCG/KG/HR: 4 INJECTION INTRAVENOUS at 09:06

## 2024-06-16 RX ADMIN — DEXTROSE MONOHYDRATE 125 ML: 100 INJECTION, SOLUTION INTRAVENOUS at 08:06

## 2024-06-16 RX ADMIN — LORAZEPAM 1 MG: 2 INJECTION INTRAMUSCULAR; INTRAVENOUS at 12:06

## 2024-06-16 RX ADMIN — MAGNESIUM SULFATE HEPTAHYDRATE 2 G: 40 INJECTION, SOLUTION INTRAVENOUS at 09:06

## 2024-06-16 NOTE — PROGRESS NOTES
[facility] - Cardiac Intensive Care  Heart Transplant  Progress Note    Patient Name: Radha Abbott  MRN: [id number]  Admission Date: 6/11/2024  Hospital Length of Stay: 5 days  Attending Physician: Sajan Hurley, *  Primary Care Provider: Vasu Kong MD  Principal Problem:Acute renal failure superimposed on stage 4 chronic kidney disease    Subjective:   Interval History: Required IV Haldol dose overnight and did sleep a bit better but still quite delirious this am. Resting comfortably, although still not following commands. Breathing comfortable and CVP down overnight with CRRT. Wife at bedside.     Continuous Infusions:   sodium chloride 0.9%   Intravenous Continuous   Stopped at 06/13/24 0117     Scheduled Meds:   amiodarone  400 mg Oral Daily    amLODIPine  5 mg Oral Daily    amoxicillin  500 mg Oral Q12H    atorvastatin  40 mg Oral QHS    folic acid  1 mg Oral Daily    hydrALAZINE  100 mg Oral Q8H    LIDOcaine  2 patch Transdermal Q24H    pantoprazole  40 mg Oral Daily    QUEtiapine  25 mg Oral QHS    sodium phosphate 30 mmol in dextrose 5 % (D5W) 250 mL IVPB  30 mmol Intravenous Once    venlafaxine  37.5 mg Oral Daily    vitamin D  1,000 Units Oral Daily    warfarin  2.5 mg Oral Daily     PRN Meds:  Current Facility-Administered Medications:     acetaminophen, 650 mg, Oral, Q6H PRN    dextrose 10%, 12.5 g, Intravenous, PRN    dextrose 10%, 25 g, Intravenous, PRN    glucagon (human recombinant), 1 mg, Intramuscular, PRN    glucose, 16 g, Oral, PRN    glucose, 24 g, Oral, PRN    insulin aspart U-100, 0-5 Units, Subcutaneous, QID (AC + HS) PRN    simethicone, 1 tablet, Oral, TID PRN    traZODone, 25 mg, Oral, Nightly PRN    Review of patient's allergies indicates:  No Known Allergies  Objective:     Vital Signs (Most Recent):  Temp: 98.8 °F (37.1 °C) (06/16/24 0701)  Pulse: 98 (06/16/24 0801)  Resp: (!) 28 (06/16/24 0801)  BP: 91/67 (06/16/24 0801)  SpO2: 97 % (06/16/24 0801) Vital Signs (24h  Range):  Temp:  [97.1 °F (36.2 °C)-98.8 °F (37.1 °C)] 98.8 °F (37.1 °C)  Pulse:  [] 98  Resp:  [22-39] 28  SpO2:  [95 %-100 %] 97 %  BP: ()/(0-78) 91/67     Patient Vitals for the past 72 hrs (Last 3 readings):   Weight   06/16/24 0501 79.6 kg (175 lb 7.8 oz)   06/15/24 1127 79.8 kg (176 lb)   06/15/24 0501 83.6 kg (184 lb 4.9 oz)     Body mass index is 23.15 kg/m².      Intake/Output Summary (Last 24 hours) at 6/16/2024 1008  Last data filed at 6/16/2024 0801  Gross per 24 hour   Intake 4239.44 ml   Output 4726 ml   Net -486.56 ml         Telemetry: SR       Physical Exam  Constitutional:       Appearance: Normal appearance.   HENT:      Head: Normocephalic and atraumatic.   Eyes:      Conjunctiva/sclera: Conjunctivae normal.      Pupils: Pupils are equal, round, and reactive to light.   Neck:      Comments: RIJ trialysis cath.   Cardiovascular:      Rate and Rhythm: Normal rate and regular rhythm.      Comments: Smooth VAD hum  Pulmonary:      Effort: Pulmonary effort is normal.      Breath sounds: Normal breath sounds.   Abdominal:      General: Bowel sounds are normal.      Palpations: Abdomen is soft.   Musculoskeletal:         General: No swelling. Normal range of motion.      Cervical back: Normal range of motion and neck supple.   Skin:     General: Skin is warm and dry.      Capillary Refill: Capillary refill takes 2 to 3 seconds.   Neurological:      General: No focal deficit present.      Mental Status: He is alert and oriented to person, place, and time.            Significant Labs:  CBC:  Recent Labs   Lab 06/14/24  0221 06/14/24  1835 06/15/24  0234 06/15/24  0810 06/15/24  1247 06/16/24  0352   WBC 9.81  --  11.96  --   --  13.49*   RBC 2.22*  --  2.21*  --   --  2.38*   HGB 6.2*  --  6.3*  --   --  6.6*   HCT 20.3*   < > 19.5* 21* 22* 20.7*     --  364  --   --  377   MCV 91  --  88  --   --  87   MCH 27.9  --  28.5  --   --  27.7   MCHC 30.5*  --  32.3  --   --  31.9*    < > =  values in this interval not displayed.     BNP:  Recent Labs   Lab 06/11/24  1933 06/14/24 0221   BNP 1,388* 1,542*     CMP:  Recent Labs   Lab 06/12/24  0345 06/12/24  1422 06/14/24  0221 06/14/24  1313 06/15/24  0234 06/15/24  1401 06/15/24  2245 06/16/24  0352   GLU 32*   < > 129*  129*  129*   < > 77  77  77  77 85 69* 54*  54*   CALCIUM 8.9   < > 9.0  9.0  9.0   < > 8.9  8.9  8.9  8.9 8.9 8.9 8.7  8.7   ALBUMIN 2.5*   < > 2.5*  2.5*  2.5*   < > 2.5*  2.5*  2.5*  2.5* 2.7* 2.5* 2.5*   PROT 7.5  --  7.6  --  7.8  --   --   --       < > 129*  129*  129*   < > 129*  129*  129*  129* 129* 129* 130*  130*   K 3.9   < > 4.7  4.7  4.7   < > 4.6  4.6  4.6  4.6 4.4 4.3 4.0  4.0   CO2 20*   < > 17*  17*  17*   < > 18*  18*  18*  18* 21* 22* 22*  22*   CL 99   < > 100  100  100   < > 99  99  99  99 96 96 96  96   *   < > 30*  30*  30*   < > 11  11  11  11 7* 7* 6*  6*   CREATININE 9.5*   < > 3.3*  3.3*  3.3*   < > 1.3  1.3  1.3  1.3 0.9 0.8 0.8  0.8   ALKPHOS 148*  --  165*  --  253*  --   --   --    ALT 67*  --  65*  --  148*  --   --   --    AST 73*  --  64*  --  207*  --   --   --    BILITOT 0.2  --  0.4  --  0.7  --   --   --     < > = values in this interval not displayed.      Coagulation:   Recent Labs   Lab 06/14/24  0221 06/15/24  0234 06/16/24  0352   INR 3.2* 3.4* 2.6*   APTT 53.5* 50.4* 43.9*     LDH:  Recent Labs   Lab 06/14/24 0221 06/14/24  0648 06/15/24  0234 06/16/24  0352   * 734* 817* 939*     Microbiology:  Microbiology Results (last 7 days)       Procedure Component Value Units Date/Time    Blood culture #1 **CANNOT BE ORDERED STAT** [[id number]] Collected: 06/11/24 2122    Order Status: Completed Specimen: Blood from Peripheral, Antecubital, Right Updated: 06/15/24 2212     Blood Culture, Routine No Growth to date      No Growth to date      No Growth to date      No Growth to date      No Growth to date    Blood culture #2  **CANNOT BE ORDERED STAT** [[id number]] Collected: 06/11/24 2121    Order Status: Completed Specimen: Blood from Peripheral, Hand, Left Updated: 06/15/24 2212     Blood Culture, Routine No Growth to date      No Growth to date      No Growth to date      No Growth to date      No Growth to date    Urine culture [[id number]] Collected: 06/12/24 1422    Order Status: Completed Specimen: Urine Updated: 06/13/24 2113     Urine Culture, Routine No growth    Narrative:      Specimen Source->Urine            I have reviewed all pertinent labs within the past 24 hours.    Estimated Creatinine Clearance: 107.8 mL/min (based on SCr of 0.8 mg/dL).    Diagnostic Results:  I have reviewed and interpreted all pertinent imaging results/findings within the past 24 hours.  Assessment and Plan:     Mr. Radha Abbott is a 65 yo male with stage D HFrEF, ICMP who underwent HM3 placement as DT (12/1/23) w/ a hospital stay complicated by vasoplegia(requiring Giaprezza), debility, malnutrition/impaired swallowing, and renal failure requiring HD (since weaned off) w/ recent discharge for ADHF presented to the ED with wife due to c/o worsening generalized weakness since 6/10. Per wife patient was mostly sleeping during the day despite sleeping 10 hrs at night. Patient also admits decreased appetite and decreased urine output despite taking his medications as prescribed. He has not urinated since 6/11 at 2pm. He has chronic shortness of breath on exertion. Denies confusion, syncope, diarrhea, constipation, N/V, dysuria, or other complaints. Patient had his prior tunnel cath removed used for HD about 2 weeks ago. He completed a 6 week therapy for Enterococcus bacteremia with ampicillin and rocephin ending 5/30/24a and was switched to PO antibiotics.     On prior hospital stay nephrology consulted during hospital stay due to elevated renal function. They feel he is close to being a dialysis patient. He diuresed on IV Bumex with prn Metolazone. He  is net negative 1.2L throughout his hospital stay and weight on day of discharge was 179lbs down from 186lbs on admit. We continued his home dose of Bumex 4mg BID but increased his prn Metolazone to 10mg. He received a dose of Epo while inpatient and he has plans to follow up with Heme/Onc to get outpatient therapy arranged.      2D Echo with CFD done on 3/26/2024  LVAD: There is a Heartmate III LVAD running at 5000 RPM. The aortic valve does not open. The ventricular septum is at midline. Inflow cannula well seated at the apex. Outflow graft not visualized.   Left Ventricle: The left ventricle is moderately dilated. Normal wall thickness. Global hypokinesis present. Septal motion is abnormal. There is severely reduced systolic function with a visually estimated ejection fraction of 5 - 10%. There is diastolic dysfunction but grade cannot be determined.   Right Ventricle: Mild right ventricular enlargement. Wall thickness is normal. Right ventricle wall motion is normal. Systolic function is normal. Pacemaker lead present in the ventricle.   Left Atrium: Left atrium is severely dilated.   Right Atrium: Right atrium is moderately dilated.   Mitral Valve: The mitral valve is repaired with an Noble stitch. There is mild regurgitation.   IVC/SVC: Normal venous pressure at 3 mmHg.              * Acute renal failure superimposed on stage 4 chronic kidney disease  Mr. Radha Abbott is a 65 yo male with stage D HFrEF, ICMP who underwent HM3 placement as DT, DM2, CKD4, HTN, E.faecalis bacteremia who presented due to c/o worsening generalized weakness since 6/10. Admitted for worsening renal function with a BUN>100/Cr. 9.9.     - CT Ab/pel: Kidneys/ Ureters: Normal in size and location.  Mild bilateral perinephric stranding, a nonspecific finding and similar to prior.  No hydronephrosis or nephrolithiasis. No ureteral dilatation.  - Nephrology consulted, appreciate recs.   -Continues on CRRt with improvement in volume status  overnight.    - Will need OP HD upon discharge, recommend CM/SW involvement to start workup.  - Will need IR evaluation once out of ICU for TDC placement.  - Renally dosing medications      Delirium  -Received Haldol overnight.   -CT head-toe neg.   -Would prefer oral agent tonight if he can tolerate PO vs PM precedex?    LVAD (left ventricular assist device) present  -HeartMate 3 Implanted  12/1/2023  as DT  -Cont Coumadin, dosing by PharmD.  Goal INR 2.0-3.0 . Supra therapeutic today.   -Antiplatelets Not on ASA  -LDH is stable overall today. Will continue to monitor daily.  -Speed set at  5000     -Interrogation notable for no events  -Not listed for OHTx, declined for OHTX due to comorbidities     Procedure: Device Interrogation Including analysis of device parameters  Current Settings: Ventricular Assist Device  Review of device function is stable        6/16/2024     8:01 AM 6/16/2024     7:01 AM 6/16/2024     6:01 AM 6/16/2024     5:01 AM 6/16/2024     4:01 AM 6/16/2024     3:01 AM 6/16/2024     2:01 AM   TXP LVAD INTERROGATIONS   Type HeartMate3 HeartMate3 HeartMate3 HeartMate3 HeartMate3 HeartMate3 HeartMate3   Flow 4.4 4.3 4.5 4.4 4.3 4.3 4.3   Speed 5050 5000 5000 5000 5000 5000 5000   PI 3.8 3.8 3.7 3.8 3.7 3.7 3.9   Power ([place]) 3.4 3.4 3.4 3.4 3.4 3.4 3.5   LSL 4600 4600 4600 4600 4600 4600 4600   Pulsatility Intermittent pulse Intermittent pulse Intermittent pulse Intermittent pulse Intermittent pulse Intermittent pulse Intermittent pulse         Acute on chronic combined systolic and diastolic heart failure  Owing to his advanced kidney disease and significantly elevated creat, he is not on a  ACEI/ARB/ARNI or MRA. He is also not on a beta blocker due to RV dysfunction.     Plan:  - See LVAD  - Se IVÁN    Hypoglycemia  -Endocrine following. D10W gtt D/C'd    Bacteremia due to Enterococcus  He completed a 6 week therapy for Enterococcus bacteremia with ampicillin and rocephin ending 5/30/24a and was  switched to Amoxicillin for suppression    Anticoagulant long-term use  Cont warfarin    Anemia  Chronic with no acute bleeding, multifactorial 2/2 CKD4 and chronic anticoagulation  -Iron panel done. Iron deficient but Ferritin is elevated at 1186  -Continue to trend    Adjustment disorder with depressed mood  Cont SSRI    PAF (paroxysmal atrial fibrillation)  -Continue amiodarone and Coumadin      Type 2 diabetes mellitus without complication, without long-term current use of insulin  -Endocrine    CAD (coronary artery disease)  -Continue statin      Uninterrupted Critical Care/Counseling Time (not including procedures): 60mn    [place] NP  Heart Transplant  [facility] - Cardiac Intensive Care

## 2024-06-16 NOTE — CARE UPDATE
HTS Care Update Note    8PM     Hemodynamics    SCVO2 53   CVP 14   MAP 78   Cardiac Output 7.06   Cardiac Index 3.48          I/Os    I/O this shift:  In: 2227.6 [I.V.:2227.6]  Out: 3689 [Emesis/NG output:1; Other:3688]      Intake/Output Summary (Last 24 hours) at 6/16/2024 0651  Last data filed at 6/16/2024 0601  Gross per 24 hour   Intake 4627.24 ml   Output 3689 ml   Net 938.24 ml       Net IO Since Admission: 1,615.01 mL [06/16/24 0651]    Diuretics: CRRT    Continuous Infusions:      sodium chloride 0.9%   Intravenous Continuous   Stopped at 06/13/24 0117       Mechanical support: LVAD  3    Plan:  - No changes made, continue current plan of care  - Plan discussed with attending     Bashir Randle MD  Cardiology Fellow  [facility]

## 2024-06-16 NOTE — ASSESSMENT & PLAN NOTE
Mr. 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 Admitted for worsening renal function with a BUN>100/Cr. 9.9.     - CT Ab/pel: Kidneys/ Ureters: Normal in size and location.  Mild bilateral perinephric stranding, a nonspecific finding and similar to prior.  No hydronephrosis or nephrolithiasis. No ureteral dilatation.  - Nephrology consulted, appreciate recs.   -Continues on CRRt with improvement in volume status overnight.    - Will need OP HD upon discharge, recommend CM/SW involvement to start workup.  - Will need IR evaluation once out of ICU for TDC placement.  - Renally dosing medications

## 2024-06-16 NOTE — ASSESSMENT & PLAN NOTE
Patient with a baseline eGFR that appears to be variable however was noted to be in the 15-20 range in February before his recent IVÁN in May, presents now with fatigue and worsening IVÁN with a creatinine of 9.8 and eGFR of 5.5. BUN was noted to be elevated at 117, other electrolytes were normal. He was noted to have some asterixis on exam (which he states is chronic for him and unchanged from his baseline), however denies any other signs or symptoms of uremia. Bedside bladder scan revealed 200 mL of urine in his bladder and his last bladder void was 10 hours prior to this consult.     SLED initiated 6/13 x4 hours.  Mental status improved.  6/14:  8 hour SLED overnight, net even.    Recommendations  -Will discontinue SLED for now, CVP this AM was 7, will provide further sessions of dialysis as needed depending on his hemodynamics and chemistry.  -Avoid nephrotoxic agents (IV contrast, NSAID's, gadolinium contrast, PPI's) if able.   -Strict I's and O's  -RFP q 12 hours while on RRT.  PRN mg/phos replacement  -Renally dose all medications  -will need OP HD upon discharge, recommend CM/SW involvement to start workup.  -will need IR evaluation once out of ICU for TDC placement.

## 2024-06-16 NOTE — PLAN OF CARE
Cardiac ICU Care Plan    POC reviewed with Radha Abbott and family. Questions and concerns addressed. No acute events today. Pt progressing toward goals. Will continue to monitor. See below and flowsheets for full assessment and VS info.     -CVPS 14, 12, 9  -SVO2 53%  -Pt taken to CT d/t altered mental status. CT unremarkable  -Pt increasingly agitated overnight - haldol, ativan, and zyprexa given without relief. Soft wrist restraints ordered  -Pt at goal on CRRT after being rinsed back to be taken to CT    Neuro:  [place] Coma Scale  Best Eye Response: 3-->(E3) to speech  Best Motor Response: 5-->(M5) localizes pain  Best Verbal Response: 3-->(V3) inappropriate words  Michelle Coma Scale Score: 11  Assessment Qualifiers: patient not sedated/intubated, no eye obstruction present  Pupil PERRLA: yes    24 hr Temp:  [97.1 °F (36.2 °C)-98.3 °F (36.8 °C)]      CV:  Rhythm: normal sinus rhythm   DVT prophylaxis: VTE Required Core Measure: Pharmacological prophylaxis initiated/maintained    CVP (mean): (S) 9 mmHg (06/16/24 0401)       SVO2 (%): (S) 53 % (06/15/24 2000)       Type: HeartMate3       Pulses  Right Radial Pulse: 1+ (weak)  Left Radial Pulse: 1+ (weak)  Right Dorsalis Pedis Pulse: 1+ (weak)  Left Dorsalis Pedis Pulse: 1+ (weak)  Right Posterior Tibial Pulse: 1+ (weak)  Left Posterior Tibial Pulse: 1+ (weak)    Resp:  Flow (L/min) (Oxygen Therapy): 4  Oxygen Concentration (%): 36    GI/:  GI prophylaxis: no  Diet/Nutrition Received: no added salt, restrict fluids  Last Bowel Movement: 06/15/24  Voiding Characteristics: oliguria, patient on CRRT   Intake/Output Summary (Last 24 hours) at 6/16/2024 0649  Last data filed at 6/16/2024 0601  Gross per 24 hour   Intake 4627.24 ml   Output 3689 ml   Net 938.24 ml     Treatment Type: Slow low efficiency dialysis  UF Rate: 500 cc/hr      Labs/Accuchecks:  Recent Labs   Lab 06/14/24  0221 06/14/24  1835 06/15/24  0234 06/15/24  0810 06/15/24  1247 06/16/24  0352   WBC  9.81  --  11.96  --   --  13.49*   RBC 2.22*  --  2.21*  --   --  2.38*   HGB 6.2*  --  6.3*  --   --  6.6*   HCT 20.3*   < > 19.5* 21* 22* 20.7*     --  364  --   --  377    < > = values in this interval not displayed.      Recent Labs   Lab 06/14/24  0221 06/15/24  0234 06/16/24  0352   INR 3.2* 3.4* 2.6*   APTT 53.5* 50.4* 43.9*      Recent Labs     06/15/24  0234 06/15/24  1401 06/16/24  0352   *  129*  129*  129*   < > 130*  130*   K 4.6  4.6  4.6  4.6   < > 4.0  4.0   CO2 18*  18*  18*  18*   < > 22*  22*   CL 99  99  99  99   < > 96  96   BUN 11  11  11  11   < > 6*  6*   CREATININE 1.3  1.3  1.3  1.3   < > 0.8  0.8   ALKPHOS 253*  --   --    *  --   --    *  --   --    BILITOT 0.7  --   --     < > = values in this interval not displayed.       Recent Labs   Lab 06/11/24  1933   TROPONINI 0.051*      Recent Labs     06/15/24  1258 06/15/24  1912 06/15/24  1917   PH 7.509* 7.494* 7.426   PCO2 28.2* 30.7* 38.0   PO2 91 79* 27*   HCO3 22.4* 23.6* 25.0   POCSATURATED 98 97 53   BE -1 0 1       Electrolytes: Electrolytes replaced  Accuchecks: ACHS    Gtts/LDAs:   sodium chloride 0.9%   Intravenous Continuous   Stopped at 06/13/24 0117       Lines/Drains/Airways       Central Venous Catheter Line  Duration             Trialysis (Dialysis) Catheter 06/12/24 0124 right internal jugular 4 days              Line  Duration                  VAD 12/01/23 1015 Left ventricular assist device HeartMate 3 197 days              Peripheral Intravenous Line  Duration                  Midline Catheter - Single Lumen 06/13/24 1623 Left basilic vein (medial side of arm) 20g x 10cm 2 days                    Skin/Wounds  Bathing/Skin Care: bath, complete;back care;linen changed;incontinence care (06/15/24 1250)  Wounds: No  Wound care consulted: No      Problem: Adult Inpatient Plan of Care  Goal: Plan of Care Review  Outcome: Progressing  Goal: Patient-Specific Goal  (Individualized)  Outcome: Progressing  Goal: Absence of Hospital-Acquired Illness or Injury  Outcome: Progressing  Goal: Optimal Comfort and Wellbeing  Outcome: Progressing  Goal: Readiness for Transition of Care  Outcome: Progressing     Problem: Infection  Goal: Absence of Infection Signs and Symptoms  Outcome: Progressing     Problem: Diabetes Comorbidity  Goal: Blood Glucose Level Within Targeted Range  Outcome: Progressing     Problem: Acute Kidney Injury/Impairment  Goal: Fluid and Electrolyte Balance  Outcome: Progressing  Goal: Improved Oral Intake  Outcome: Progressing  Goal: Effective Renal Function  Outcome: Progressing     Problem: Pneumonia  Goal: Fluid Balance  Outcome: Progressing  Goal: Resolution of Infection Signs and Symptoms  Outcome: Progressing  Goal: Effective Oxygenation and Ventilation  Outcome: Progressing     Problem: Skin Injury Risk Increased  Goal: Skin Health and Integrity  Outcome: Progressing     Problem: CRRT (Continuous Renal Replacement Therapy)  Goal: Safe, Effective Therapy Delivery  Outcome: Progressing  Goal: Hemodynamic Stability  Outcome: Progressing  Goal: Body Temperature Maintained in Desired Range  Outcome: Progressing  Goal: Absence of Infection Signs and Symptoms  Outcome: Progressing     Problem: Ventricular Assist Device  Goal: Optimal Adjustment to Device  Outcome: Progressing  Goal: Absence of Bleeding  Outcome: Progressing  Goal: Absence of Embolism Signs and Symptoms  Outcome: Progressing  Goal: Optimal Blood Flow  Outcome: Progressing  Goal: Absence of Infection Signs and Symptoms  Outcome: Progressing  Goal: Effective Right-Sided Heart Function  Outcome: Progressing     Problem: Ventricular Assist Device  Goal: Optimal Adjustment to Device  Outcome: Progressing  Goal: Absence of Bleeding  Outcome: Progressing  Goal: Absence of Embolism Signs and Symptoms  Outcome: Progressing  Goal: Optimal Blood Flow  Outcome: Progressing  Goal: Absence of Infection Signs and  Symptoms  Outcome: Progressing  Goal: Effective Right-Sided Heart Function  Outcome: Progressing     Problem: Fall Injury Risk  Goal: Absence of Fall and Fall-Related Injury  Outcome: Progressing     Problem: Hemodialysis  Goal: Safe, Effective Therapy Delivery  Outcome: Progressing  Goal: Effective Tissue Perfusion  Outcome: Progressing  Goal: Absence of Infection Signs and Symptoms  Outcome: Progressing     Problem: Restraint, Nonviolent  Goal: Absence of Harm or Injury  Outcome: Progressing

## 2024-06-16 NOTE — ASSESSMENT & PLAN NOTE
-Received Haldol overnight.   -CT head-toe neg.   -Would prefer oral agent tonight if he can tolerate PO vs PM precedex?

## 2024-06-16 NOTE — ASSESSMENT & PLAN NOTE
Chronic with no acute bleeding, multifactorial 2/2 CKD4 and chronic anticoagulation  -Iron panel done. Iron deficient but Ferritin is elevated at 1186  -Continue to trend

## 2024-06-16 NOTE — SUBJECTIVE & OBJECTIVE
"Interval HPI:   No acute events overnight. Patient in room [id number] A. Blood glucose stable. BG at and below goal on current insulin regimen (None). Steroid use- None .   Renal function- Abnormal -    Vasopressors-  None      Diet Renal Coumadin Restriction; Fluid - 2000mL      Eatin%  Nausea: No  Hypoglycemia and intervention: Yes D 10  Fever: No  TPN and/or TF: No    BP 91/67 (BP Location: Right arm, Patient Position: Lying)   Pulse 96   Temp 100 °F (37.8 °C) (Axillary)   Resp (!) 27   Ht 6' 1" (1.854 m)   Wt 79.6 kg (175 lb 7.8 oz)   SpO2 (!) 93%   BMI 23.15 kg/m²     Labs Reviewed and Include    Recent Labs   Lab 24  0352   GLU 54*  54*   CALCIUM 8.7  8.7   ALBUMIN 2.5*   *  130*   K 4.0  4.0   CO2 22*  22*   CL 96  96   BUN 6*  6*   CREATININE 0.8  0.8     Lab Results   Component Value Date    WBC 13.49 (H) 2024    HGB 6.6 (L) 2024    HCT 20.7 (L) 2024    MCV 87 2024     2024     No results for input(s): "TSH", "FREET4" in the last 168 hours.  Lab Results   Component Value Date    HGBA1C 7.4 (H) 2024       Nutritional status:   Body mass index is 23.15 kg/m².  Lab Results   Component Value Date    ALBUMIN 2.5 (L) 2024    ALBUMIN 2.5 (L) 06/15/2024    ALBUMIN 2.7 (L) 06/15/2024     Lab Results   Component Value Date    PREALBUMIN 22 2024    PREALBUMIN 25 2024    PREALBUMIN 23 2024       Estimated Creatinine Clearance: 107.8 mL/min (based on SCr of 0.8 mg/dL).    Accu-Checks  Recent Labs     24  2119 24  0104 24  0647 24  0748 24  1318 24  2211 06/15/24  0821 06/15/24  1302 06/15/24  1817 24  0759   POCTGLUCOSE 141* 126* 193* 182* 129* 95 73 66* 90 67*       Current Medications and/or Treatments Impacting Glycemic Control  Immunotherapy:    Immunosuppressants       None          Steroids:   Hormones (From admission, onward)      None          Pressors:  "   Autonomic Drugs (From admission, onward)      None          Hyperglycemia/Diabetes Medications:   Antihyperglycemics (From admission, onward)      Start     Stop Route Frequency Ordered    06/14/24 0910  insulin aspart U-100 pen 0-5 Units         -- SubQ Before meals & nightly PRN 06/14/24 0811

## 2024-06-16 NOTE — SUBJECTIVE & OBJECTIVE
Interval History: CVP 7 this AM, NAEON.    Review of patient's allergies indicates:  No Known Allergies  Current Facility-Administered Medications   Medication Frequency    0.9%  NaCl infusion Continuous    acetaminophen tablet 650 mg Q6H PRN    amiodarone tablet 400 mg Daily    amLODIPine tablet 5 mg Daily    amoxicillin capsule 500 mg Q12H    atorvastatin tablet 40 mg QHS    dexmedetomidine (PRECEDEX) 400mcg/100mL 0.9% NaCL infusion Continuous    dextrose 10 % infusion Continuous    dextrose 10% bolus 125 mL 125 mL PRN    dextrose 10% bolus 250 mL 250 mL PRN    folic acid tablet 1 mg Daily    glucagon (human recombinant) injection 1 mg PRN    glucose chewable tablet 16 g PRN    glucose chewable tablet 24 g PRN    hydrALAZINE tablet 100 mg Q8H    insulin aspart U-100 pen 0-5 Units QID (AC + HS) PRN    LIDOcaine 5 % patch 2 patch Q24H    pantoprazole EC tablet 40 mg Daily    QUEtiapine tablet 25 mg QHS    simethicone chewable tablet 80 mg TID PRN    trazodone split tablet 25 mg Nightly PRN    venlafaxine tablet 37.5 mg Daily    vitamin D 1000 units tablet 1,000 Units Daily    warfarin (COUMADIN) tablet 2.5 mg Daily       Objective:     Vital Signs (Most Recent):  Temp: 100 °F (37.8 °C) (06/16/24 1101)  Pulse: 96 (06/16/24 1101)  Resp: (!) 27 (06/16/24 1101)  BP: 91/67 (06/16/24 0801)  SpO2: (!) 93 % (06/16/24 1101) Vital Signs (24h Range):  Temp:  [97.5 °F (36.4 °C)-100 °F (37.8 °C)] 100 °F (37.8 °C)  Pulse:  [] 96  Resp:  [22-39] 27  SpO2:  [93 %-100 %] 93 %  BP: ()/(0-78) 91/67     Weight: 79.6 kg (175 lb 7.8 oz) (06/16/24 0501)  Body mass index is 23.15 kg/m².  Body surface area is 2.02 meters squared.    I/O last 3 completed shifts:  In: 6563.1 [P.O.:100; I.V.:6391; IV Piggyback:72.1]  Out: 7114 [Emesis/NG output:1; Other:7113]     Physical Exam  Constitutional:       General: He is not in acute distress.     Appearance: He is ill-appearing.   Cardiovascular:      Rate and Rhythm: Normal rate.    Pulmonary:      Effort: Pulmonary effort is normal. No respiratory distress.      Breath sounds: No wheezing or rales.   Abdominal:      Palpations: Abdomen is soft.   Musculoskeletal:      Right lower leg: No edema.      Left lower leg: No edema.   Skin:     General: Skin is warm.   Neurological:      Mental Status: He is alert.          Significant Labs:  BMP:   Recent Labs   Lab 06/16/24  0352   GLU 54*  54*   *  130*   K 4.0  4.0   CL 96  96   CO2 22*  22*   BUN 6*  6*   CREATININE 0.8  0.8   CALCIUM 8.7  8.7   MG 1.9  1.9     CBC:   Recent Labs   Lab 06/16/24  0352   WBC 13.49*   RBC 2.38*   HGB 6.6*   HCT 20.7*      MCV 87   MCH 27.7   MCHC 31.9*        Significant Imaging:  Labs: Reviewed  ECG: Reviewed  X-Ray: Reviewed  US: Reviewed

## 2024-06-16 NOTE — SUBJECTIVE & OBJECTIVE
Interval History: Required IV Haldol dose overnight and did sleep a bit better but still quite delirious this am. Resting comfortably, although still not following commands. Breathing comfortable and CVP down overnight with CRRT. Wife at bedside.     Continuous Infusions:   sodium chloride 0.9%   Intravenous Continuous   Stopped at 06/13/24 0117     Scheduled Meds:   amiodarone  400 mg Oral Daily    amLODIPine  5 mg Oral Daily    amoxicillin  500 mg Oral Q12H    atorvastatin  40 mg Oral QHS    folic acid  1 mg Oral Daily    hydrALAZINE  100 mg Oral Q8H    LIDOcaine  2 patch Transdermal Q24H    pantoprazole  40 mg Oral Daily    QUEtiapine  25 mg Oral QHS    sodium phosphate 30 mmol in dextrose 5 % (D5W) 250 mL IVPB  30 mmol Intravenous Once    venlafaxine  37.5 mg Oral Daily    vitamin D  1,000 Units Oral Daily    warfarin  2.5 mg Oral Daily     PRN Meds:  Current Facility-Administered Medications:     acetaminophen, 650 mg, Oral, Q6H PRN    dextrose 10%, 12.5 g, Intravenous, PRN    dextrose 10%, 25 g, Intravenous, PRN    glucagon (human recombinant), 1 mg, Intramuscular, PRN    glucose, 16 g, Oral, PRN    glucose, 24 g, Oral, PRN    insulin aspart U-100, 0-5 Units, Subcutaneous, QID (AC + HS) PRN    simethicone, 1 tablet, Oral, TID PRN    traZODone, 25 mg, Oral, Nightly PRN    Review of patient's allergies indicates:  No Known Allergies  Objective:     Vital Signs (Most Recent):  Temp: 98.8 °F (37.1 °C) (06/16/24 0701)  Pulse: 98 (06/16/24 0801)  Resp: (!) 28 (06/16/24 0801)  BP: 91/67 (06/16/24 0801)  SpO2: 97 % (06/16/24 0801) Vital Signs (24h Range):  Temp:  [97.1 °F (36.2 °C)-98.8 °F (37.1 °C)] 98.8 °F (37.1 °C)  Pulse:  [] 98  Resp:  [22-39] 28  SpO2:  [95 %-100 %] 97 %  BP: ()/(0-78) 91/67     Patient Vitals for the past 72 hrs (Last 3 readings):   Weight   06/16/24 0501 79.6 kg (175 lb 7.8 oz)   06/15/24 1127 79.8 kg (176 lb)   06/15/24 0501 83.6 kg (184 lb 4.9 oz)     Body mass index is 23.15  kg/m².      Intake/Output Summary (Last 24 hours) at 6/16/2024 1008  Last data filed at 6/16/2024 0801  Gross per 24 hour   Intake 4239.44 ml   Output 4726 ml   Net -486.56 ml         Telemetry: SR       Physical Exam  Constitutional:       Appearance: Normal appearance.   HENT:      Head: Normocephalic and atraumatic.   Eyes:      Conjunctiva/sclera: Conjunctivae normal.      Pupils: Pupils are equal, round, and reactive to light.   Neck:      Comments: RIJ trialysis cath.   Cardiovascular:      Rate and Rhythm: Normal rate and regular rhythm.      Comments: Smooth VAD hum  Pulmonary:      Effort: Pulmonary effort is normal.      Breath sounds: Normal breath sounds.   Abdominal:      General: Bowel sounds are normal.      Palpations: Abdomen is soft.   Musculoskeletal:         General: No swelling. Normal range of motion.      Cervical back: Normal range of motion and neck supple.   Skin:     General: Skin is warm and dry.      Capillary Refill: Capillary refill takes 2 to 3 seconds.   Neurological:      General: No focal deficit present.      Mental Status: He is alert and oriented to person, place, and time.            Significant Labs:  CBC:  Recent Labs   Lab 06/14/24  0221 06/14/24  1835 06/15/24  0234 06/15/24  0810 06/15/24  1247 06/16/24  0352   WBC 9.81  --  11.96  --   --  13.49*   RBC 2.22*  --  2.21*  --   --  2.38*   HGB 6.2*  --  6.3*  --   --  6.6*   HCT 20.3*   < > 19.5* 21* 22* 20.7*     --  364  --   --  377   MCV 91  --  88  --   --  87   MCH 27.9  --  28.5  --   --  27.7   MCHC 30.5*  --  32.3  --   --  31.9*    < > = values in this interval not displayed.     BNP:  Recent Labs   Lab 06/11/24  1933 06/14/24 0221   BNP 1,388* 1,542*     CMP:  Recent Labs   Lab 06/12/24  0345 06/12/24  1422 06/14/24  0221 06/14/24  1313 06/15/24  0234 06/15/24  1401 06/15/24  2245 06/16/24  0352   GLU 32*   < > 129*  129*  129*   < > 77  77  77  77 85 69* 54*  54*   CALCIUM 8.9   < > 9.0  9.0  9.0    < > 8.9  8.9  8.9  8.9 8.9 8.9 8.7  8.7   ALBUMIN 2.5*   < > 2.5*  2.5*  2.5*   < > 2.5*  2.5*  2.5*  2.5* 2.7* 2.5* 2.5*   PROT 7.5  --  7.6  --  7.8  --   --   --       < > 129*  129*  129*   < > 129*  129*  129*  129* 129* 129* 130*  130*   K 3.9   < > 4.7  4.7  4.7   < > 4.6  4.6  4.6  4.6 4.4 4.3 4.0  4.0   CO2 20*   < > 17*  17*  17*   < > 18*  18*  18*  18* 21* 22* 22*  22*   CL 99   < > 100  100  100   < > 99  99  99  99 96 96 96  96   *   < > 30*  30*  30*   < > 11  11  11  11 7* 7* 6*  6*   CREATININE 9.5*   < > 3.3*  3.3*  3.3*   < > 1.3  1.3  1.3  1.3 0.9 0.8 0.8  0.8   ALKPHOS 148*  --  165*  --  253*  --   --   --    ALT 67*  --  65*  --  148*  --   --   --    AST 73*  --  64*  --  207*  --   --   --    BILITOT 0.2  --  0.4  --  0.7  --   --   --     < > = values in this interval not displayed.      Coagulation:   Recent Labs   Lab 06/14/24  0221 06/15/24  0234 06/16/24  0352   INR 3.2* 3.4* 2.6*   APTT 53.5* 50.4* 43.9*     LDH:  Recent Labs   Lab 06/14/24 0221 06/14/24 0648 06/15/24  0234 06/16/24  0352   * 734* 817* 939*     Microbiology:  Microbiology Results (last 7 days)       Procedure Component Value Units Date/Time    Blood culture #1 **CANNOT BE ORDERED STAT** [[id number]] Collected: 06/11/24 2122    Order Status: Completed Specimen: Blood from Peripheral, Antecubital, Right Updated: 06/15/24 2212     Blood Culture, Routine No Growth to date      No Growth to date      No Growth to date      No Growth to date      No Growth to date    Blood culture #2 **CANNOT BE ORDERED STAT** [[id number]] Collected: 06/11/24 2121    Order Status: Completed Specimen: Blood from Peripheral, Hand, Left Updated: 06/15/24 2212     Blood Culture, Routine No Growth to date      No Growth to date      No Growth to date      No Growth to date      No Growth to date    Urine culture [[id number]] Collected: 06/12/24 1422    Order Status: Completed  Specimen: Urine Updated: 06/13/24 2113     Urine Culture, Routine No growth    Narrative:      Specimen Source->Urine            I have reviewed all pertinent labs within the past 24 hours.    Estimated Creatinine Clearance: 107.8 mL/min (based on SCr of 0.8 mg/dL).    Diagnostic Results:  I have reviewed and interpreted all pertinent imaging results/findings within the past 24 hours.

## 2024-06-16 NOTE — PROGRESS NOTES
06/16/24 1040   Treatment   Treatment Status Discontinued treatment;Blood returned   Dialysis Machine Number [id number]   Dialyzer Time (hours) 14.22   BVP (Liters) 126.4 L   CRRT Comments Sled Completed   CRRT Hourly Documentation   Total UF (Hourly Cleared) (mL) 294     SLED Completed, blood returned, Machine disinfected.   27-Jan-2023

## 2024-06-16 NOTE — PROGRESS NOTES
[facility] - Cardiac Intensive Care  Endocrinology  Progress Note    Admit Date: 6/11/2024     Reason for Consult: Management of T2DM, Hyperglycemia     Surgical Procedure and Date: HM3 placement as DT (12/1/23)     Diabetes diagnosis year: several years ago    Home Diabetes Medications:  Amaryl 1 mg (recently prescribed by PCP about a week ago)     Lab Results   Component Value Date    HGBA1C 7.4 (H) 04/17/2024       How often checking glucose at home? Once daily in the AM ( prior to starting Amaryl BG in the 300s, since starting Amaryl BG 70-130s)   BG readings on regimen: 70-130s  Hypoglycemia on the regimen?  No  Missed doses on regimen?  No    Diabetes Complications include:     Hypoglycemia , Diabetic nephropathy  , Diabetic chronic kidney disease     , and Diabetic peripheral neuropathy     Complicating diabetes co morbidities:   CHF and CKD      HPI:   Patient is a 64 y.o. male with a diagnosis of stage D HFrEF, ICMP who underwent HM3 placement as DT (12/1/23) w/ a hospital stay complicated by vasoplegia(requiring Giaprezza), debility, malnutrition/impaired swallowing, and renal failure requiring HD (since weaned off) w/ recent discharge for ADHF presented to the ED with wife due to c/o worsening generalized weakness since 6/10. Per wife patient was mostly sleeping during the day despite sleeping 10 hrs at night. Patient also admits decreased appetite and decreased urine output despite taking his medications as prescribed. He has chronic shortness of breath on exertion. Denies confusion, syncope, diarrhea, constipation, N/V, dysuria, or other complaints. Patient had his prior tunnel cath removed used for HD about 2 weeks ago. He completed a 6 week therapy for Enterococcus bacteremia with ampicillin and rocephin ending 5/30/24 and was switched to PO antibiotics. Off note, patient was recently started by his PCP on glimepiride 1mg and unclear of BG trends during the day. Per patient last fasting  on 6/11.  "Patient reports lasting taking the glimepiride on  AM.   Admitted for worsening renal function with a BUN>100/Cr. 9.9. Endocrinology consulted for management of T2DM.          Interval HPI:   No acute events overnight. Patient in room [id number] A. Blood glucose stable. BG at and below goal on current insulin regimen (None). Steroid use- None .   Renal function- Abnormal -    Vasopressors-  None      Diet Renal Coumadin Restriction; Fluid - 2000mL      Eatin%  Nausea: No  Hypoglycemia and intervention: Yes D 10  Fever: No  TPN and/or TF: No    BP 91/67 (BP Location: Right arm, Patient Position: Lying)   Pulse 96   Temp 100 °F (37.8 °C) (Axillary)   Resp (!) 27   Ht 6' 1" (1.854 m)   Wt 79.6 kg (175 lb 7.8 oz)   SpO2 (!) 93%   BMI 23.15 kg/m²     Labs Reviewed and Include    Recent Labs   Lab 24  0352   GLU 54*  54*   CALCIUM 8.7  8.7   ALBUMIN 2.5*   *  130*   K 4.0  4.0   CO2 22*  22*   CL 96  96   BUN 6*  6*   CREATININE 0.8  0.8     Lab Results   Component Value Date    WBC 13.49 (H) 2024    HGB 6.6 (L) 2024    HCT 20.7 (L) 2024    MCV 87 2024     2024     No results for input(s): "TSH", "FREET4" in the last 168 hours.  Lab Results   Component Value Date    HGBA1C 7.4 (H) 2024       Nutritional status:   Body mass index is 23.15 kg/m².  Lab Results   Component Value Date    ALBUMIN 2.5 (L) 2024    ALBUMIN 2.5 (L) 06/15/2024    ALBUMIN 2.7 (L) 06/15/2024     Lab Results   Component Value Date    PREALBUMIN 22 2024    PREALBUMIN 25 2024    PREALBUMIN 23 2024       Estimated Creatinine Clearance: 107.8 mL/min (based on SCr of 0.8 mg/dL).    Accu-Checks  Recent Labs     244 24  0647 24  0748 24  1318 24  2211 06/15/24  0821 06/15/24  1302 06/15/24  1817 24  0759   POCTGLUCOSE 141* 126* 193* 182* 129* 95 73 66* 90 67*       Current Medications and/or " Treatments Impacting Glycemic Control  Immunotherapy:    Immunosuppressants       None          Steroids:   Hormones (From admission, onward)      None          Pressors:    Autonomic Drugs (From admission, onward)      None          Hyperglycemia/Diabetes Medications:   Antihyperglycemics (From admission, onward)      Start     Stop Route Frequency Ordered    06/14/24 0910  insulin aspart U-100 pen 0-5 Units         -- SubQ Before meals & nightly PRN 06/14/24 0811            ASSESSMENT and PLAN    Cardiac/Vascular  LVAD (left ventricular assist device) present  Managed per primary team  Avoid hypoglycemia        Renal/  * Acute renal failure superimposed on stage 4 chronic kidney disease  Titrate insulin slowly to avoid hypoglycemia as the risk of hypoglycemia increases with decreased creatinine clearance.        Endocrine  Type 2 diabetes mellitus without complication, without long-term current use of insulin  BG goal 140-180  T2DM.  LVAD. Admitted for worsening renal function with a BUN>100/Cr. 9.9. Hypoglycemia likely secondary to RIVAS (glimepiride) with worsened kidney function. Last took on 6/11. D10 drip stopped and blood sugar downtrended w/ lows requiring D10.     Plan:  -Recommend restart D10 infusion at 25mL/hr, but will defer to primary for fluid management.  -Novolog /50  -BG monitoring ac/hs  -Hypoglycemia protocol in place    ** Please call Endocrine for any BG related issues **    Discharge plans:   BRANDO  -D/C Camila Wilson PA-C  Endocrinology  [facility] - Cardiac Intensive [facility]

## 2024-06-16 NOTE — PROGRESS NOTES
06/15/24 2016   Treatment   Treatment Type SLED   Treatment Status Restart   Dialysis Machine Number [id number]   Dialyzer Time (hours) 0   Solutions Labeled and Current  Yes   Access Temporary Cath   Catheter Dressing Intact  Yes   Alarms Engaged Yes   CRRT Comments crrt restart   $ CRRT Charges   $ CRRT Charges Restart   CRRT Hourly Documentation   Blood Flow (mL/min) 150   UF Rate 200 cc/hr   Arterial Pressure (mmHg) 30 mmHg   Venous Pressure (mmHg) 30 mmHg   Effluent Pressure (EP) (mmHg) 30 mmHg   Total UF (Hourly Cleared) (mL) 0     Received report from primary rn, crrt restart, lines secured, pt accessed

## 2024-06-16 NOTE — PROGRESS NOTES
[facility] - Cardiac Intensive Care  Nephrology  Progress Note    Patient Name: Radha Abbott  MRN: [id number]  Admission Date: 6/11/2024  Hospital Length of Stay: 5 days  Attending Provider: Sajan Hurley, *   Primary Care Physician: Vasu Kong MD  Principal Problem:Acute renal failure superimposed on stage 4 chronic kidney disease    Subjective:     HPI: Mr. Abbott is a 64-year-old man with ischemic cardiomyopathy with most recent TTE showing EF 10 -15% and G3DD s/p [employer] ICM placement previously on chronic dobutamine infusion however now with Heartmate III LVAD placed on 12/01/2023, CAD s/p x3 vessel CABG back in 2009, type 2 diabetes mellitus (most recent hemoglobin A1c 7.4%), hypertension, HLD, history of ventricular fibrillation for which he is on amiodarone, chronic AC with Coumadin, advanced CKD V (baseline creatinine ~6) with recurrent AKIs and previous dialysis dependence (tunneled HD catheter removed in early March of this year). He had a recent hospitalization where he was treated for fluid overload acute on chronic CHF exacerbation and IVÁN thought to be 2/2 type 2 CRS and was subsequently diuresed and discharged with a creatinine of 6.2. He presents to the hospital on 6/11 for concerns of worsening fatigue that started one day prior to him arriving to the hospital, and has had difficulties getting out of bed. He has also noted a decrease in his urine output over the past 36 hours despite Diuretic use. He admits adherence to his Metolazone and Bumex at home, however has not passed any urine in the past 10 hours. Bed side bladder scan revealed 200 mL of urine in his bladder. Upon arrival to the ER, his is hemodynamically stable, Labs showed Hgb of 6.3, electrolytes within acceptable limits, Serum CO2 of 21, BUN of 118, and a creatinine of 9.8. He denies feeling fluid overloaded or short of breath, reports good appetite, denies nausea, vomiting, or metallic taste in his mouth. Chest xray  showed mild central vascular congestion and interstitial pulmonary opacities which appear improved compared to previous studies. CT abdomen/Pelvis without contrast revealed Kidneys that were reported as normal in size and location, and without hydronephrosis or ureteral dilation.     Interval History: CVP 7 this AM, NAEON.    Review of patient's allergies indicates:  No Known Allergies  Current Facility-Administered Medications   Medication Frequency    0.9%  NaCl infusion Continuous    acetaminophen tablet 650 mg Q6H PRN    amiodarone tablet 400 mg Daily    amLODIPine tablet 5 mg Daily    amoxicillin capsule 500 mg Q12H    atorvastatin tablet 40 mg QHS    dexmedetomidine (PRECEDEX) 400mcg/100mL 0.9% NaCL infusion Continuous    dextrose 10 % infusion Continuous    dextrose 10% bolus 125 mL 125 mL PRN    dextrose 10% bolus 250 mL 250 mL PRN    folic acid tablet 1 mg Daily    glucagon (human recombinant) injection 1 mg PRN    glucose chewable tablet 16 g PRN    glucose chewable tablet 24 g PRN    hydrALAZINE tablet 100 mg Q8H    insulin aspart U-100 pen 0-5 Units QID (AC + HS) PRN    LIDOcaine 5 % patch 2 patch Q24H    pantoprazole EC tablet 40 mg Daily    QUEtiapine tablet 25 mg QHS    simethicone chewable tablet 80 mg TID PRN    trazodone split tablet 25 mg Nightly PRN    venlafaxine tablet 37.5 mg Daily    vitamin D 1000 units tablet 1,000 Units Daily    warfarin (COUMADIN) tablet 2.5 mg Daily       Objective:     Vital Signs (Most Recent):  Temp: 100 °F (37.8 °C) (06/16/24 1101)  Pulse: 96 (06/16/24 1101)  Resp: (!) 27 (06/16/24 1101)  BP: 91/67 (06/16/24 0801)  SpO2: (!) 93 % (06/16/24 1101) Vital Signs (24h Range):  Temp:  [97.5 °F (36.4 °C)-100 °F (37.8 °C)] 100 °F (37.8 °C)  Pulse:  [] 96  Resp:  [22-39] 27  SpO2:  [93 %-100 %] 93 %  BP: ()/(0-78) 91/67     Weight: 79.6 kg (175 lb 7.8 oz) (06/16/24 0501)  Body mass index is 23.15 kg/m².  Body surface area is 2.02 meters squared.    I/O last 3  completed shifts:  In: 6563.1 [P.O.:100; I.V.:6391; IV Piggyback:72.1]  Out: 7114 [Emesis/NG output:1; Other:7113]     Physical Exam  Constitutional:       General: He is not in acute distress.     Appearance: He is ill-appearing.   Cardiovascular:      Rate and Rhythm: Normal rate.   Pulmonary:      Effort: Pulmonary effort is normal. No respiratory distress.      Breath sounds: No wheezing or rales.   Abdominal:      Palpations: Abdomen is soft.   Musculoskeletal:      Right lower leg: No edema.      Left lower leg: No edema.   Skin:     General: Skin is warm.   Neurological:      Mental Status: He is alert.          Significant Labs:  BMP:   Recent Labs   Lab 06/16/24  0352   GLU 54*  54*   *  130*   K 4.0  4.0   CL 96  96   CO2 22*  22*   BUN 6*  6*   CREATININE 0.8  0.8   CALCIUM 8.7  8.7   MG 1.9  1.9     CBC:   Recent Labs   Lab 06/16/24  0352   WBC 13.49*   RBC 2.38*   HGB 6.6*   HCT 20.7*      MCV 87   MCH 27.7   MCHC 31.9*        Significant Imaging:  Labs: Reviewed  ECG: Reviewed  X-Ray: Reviewed  US: Reviewed  Assessment/Plan:     Renal/  * Acute renal failure superimposed on stage 4 chronic kidney disease  Patient with a baseline eGFR that appears to be variable however was noted to be in the 15-20 range in February before his recent IVÁN in May, presents now with fatigue and worsening IVÁN with a creatinine of 9.8 and eGFR of 5.5. BUN was noted to be elevated at 117, other electrolytes were normal. He was noted to have some asterixis on exam (which he states is chronic for him and unchanged from his baseline), however denies any other signs or symptoms of uremia. Bedside bladder scan revealed 200 mL of urine in his bladder and his last bladder void was 10 hours prior to this consult.     SLED initiated 6/13 x4 hours.  Mental status improved.  6/14:  8 hour SLED overnight, net even.    Recommendations  -Will discontinue SLED for now, CVP this AM was 7, will provide further sessions  of dialysis as needed depending on his hemodynamics and chemistry.  -Avoid nephrotoxic agents (IV contrast, NSAID's, gadolinium contrast, PPI's) if able.   -Strict I's and O's  -RFP q 12 hours while on RRT.  PRN mg/phos replacement  -Renally dose all medications  -will need OP HD upon discharge, recommend CM/SW involvement to start workup.  -will need IR evaluation once out of ICU for TDC placement.        Thank you for your consult. I will follow-up with patient. Please contact us if you have any additional questions.    Kamilla Angela MD  Nephrology  [facility] - Cardiac [facility]

## 2024-06-16 NOTE — ASSESSMENT & PLAN NOTE
-HeartMate 3 Implanted  12/1/2023  as DT  -Cont Coumadin, dosing by PharmD.  Goal INR 2.0-3.0 . Supra therapeutic today.   -Antiplatelets Not on ASA  -LDH is stable overall today. Will continue to monitor daily.  -Speed set at  5000     -Interrogation notable for no events  -Not listed for OHTx, declined for OHTX due to comorbidities     Procedure: Device Interrogation Including analysis of device parameters  Current Settings: Ventricular Assist Device  Review of device function is stable        6/16/2024     8:01 AM 6/16/2024     7:01 AM 6/16/2024     6:01 AM 6/16/2024     5:01 AM 6/16/2024     4:01 AM 6/16/2024     3:01 AM 6/16/2024     2:01 AM   TXP LVAD INTERROGATIONS   Type HeartMate3 HeartMate3 HeartMate3 HeartMate3 HeartMate3 HeartMate3 HeartMate3   Flow 4.4 4.3 4.5 4.4 4.3 4.3 4.3   Speed 5050 5000 5000 5000 5000 5000 5000   PI 3.8 3.8 3.7 3.8 3.7 3.7 3.9   Power ([place]) 3.4 3.4 3.4 3.4 3.4 3.4 3.5   LSL 4600 4600 4600 4600 4600 4600 4600   Pulsatility Intermittent pulse Intermittent pulse Intermittent pulse Intermittent pulse Intermittent pulse Intermittent pulse Intermittent pulse

## 2024-06-16 NOTE — ASSESSMENT & PLAN NOTE
BG goal 140-180  T2DM.  LVAD. Admitted for worsening renal function with a BUN>100/Cr. 9.9. Hypoglycemia likely secondary to RIVAS (glimepiride) with worsened kidney function. Last took on 6/11. D10 drip stopped and blood sugar downtrended w/ lows requiring D10.     Plan:  -Recommend restart D10 infusion at 25mL/hr, but will defer to primary for fluid management.  -Novolog /50  -BG monitoring ac/hs  -Hypoglycemia protocol in place    ** Please call Endocrine for any BG related issues **    Discharge plans:   TBD  -D/C Amaryl

## 2024-06-17 PROBLEM — J69.0 ASPIRATION PNEUMONIA OF BOTH LUNGS: Status: ACTIVE | Noted: 2024-06-17

## 2024-06-17 LAB
ABO + RH BLD: NORMAL
ALBUMIN SERPL BCP-MCNC: 2.3 G/DL (ref 3.5–5.2)
ALLENS TEST: ABNORMAL
ALLENS TEST: ABNORMAL
ALP SERPL-CCNC: 287 U/L (ref 55–135)
ALT SERPL W/O P-5'-P-CCNC: 285 U/L (ref 10–44)
ANION GAP SERPL CALC-SCNC: 13 MMOL/L (ref 8–16)
APTT PPP: 40.2 SEC (ref 21–32)
AST SERPL-CCNC: 325 U/L (ref 10–40)
BASOPHILS # BLD AUTO: 0.01 K/UL (ref 0–0.2)
BASOPHILS NFR BLD: 0.1 % (ref 0–1.9)
BILIRUB DIRECT SERPL-MCNC: 0.4 MG/DL (ref 0.1–0.3)
BILIRUB SERPL-MCNC: 0.7 MG/DL (ref 0.1–1)
BLD GP AB SCN CELLS X3 SERPL QL: NORMAL
BLD PROD TYP BPU: NORMAL
BLOOD UNIT EXPIRATION DATE: NORMAL
BLOOD UNIT TYPE CODE: 6200
BLOOD UNIT TYPE: NORMAL
BNP SERPL-MCNC: 1023 PG/ML (ref 0–99)
BUN SERPL-MCNC: 22 MG/DL (ref 8–23)
CALCIUM SERPL-MCNC: 8.7 MG/DL (ref 8.7–10.5)
CHLORIDE SERPL-SCNC: 96 MMOL/L (ref 95–110)
CO2 SERPL-SCNC: 22 MMOL/L (ref 23–29)
CODING SYSTEM: NORMAL
CREAT SERPL-MCNC: 2.4 MG/DL (ref 0.5–1.4)
CROSSMATCH INTERPRETATION: NORMAL
CRP SERPL-MCNC: 241.8 MG/L (ref 0–8.2)
DELSYS: ABNORMAL
DELSYS: ABNORMAL
DIFFERENTIAL METHOD BLD: ABNORMAL
DISPENSE STATUS: NORMAL
EOSINOPHIL # BLD AUTO: 0 K/UL (ref 0–0.5)
EOSINOPHIL NFR BLD: 0.1 % (ref 0–8)
ERYTHROCYTE [DISTWIDTH] IN BLOOD BY AUTOMATED COUNT: 17.2 % (ref 11.5–14.5)
ERYTHROCYTE [SEDIMENTATION RATE] IN BLOOD BY WESTERGREN METHOD: 20 MM/H
EST. GFR  (NO RACE VARIABLE): 29.8 ML/MIN/1.73 M^2
FIO2: 32
FLOW: 3
GLUCOSE SERPL-MCNC: 96 MG/DL (ref 70–110)
HCO3 UR-SCNC: 22 MMOL/L (ref 24–28)
HCT VFR BLD AUTO: 20.9 % (ref 40–54)
HCYS SERPL-SCNC: 10 UMOL/L (ref 4–16.5)
HGB BLD-MCNC: 6.5 G/DL (ref 14–18)
IMM GRANULOCYTES # BLD AUTO: 0.11 K/UL (ref 0–0.04)
IMM GRANULOCYTES NFR BLD AUTO: 0.9 % (ref 0–0.5)
INR PPP: 1.8 (ref 0.8–1.2)
LDH SERPL L TO P-CCNC: 468 U/L (ref 110–260)
LYMPHOCYTES # BLD AUTO: 0.3 K/UL (ref 1–4.8)
LYMPHOCYTES NFR BLD: 2.3 % (ref 18–48)
MAGNESIUM SERPL-MCNC: 2.5 MG/DL (ref 1.6–2.6)
MCH RBC QN AUTO: 27.4 PG (ref 27–31)
MCHC RBC AUTO-ENTMCNC: 31.1 G/DL (ref 32–36)
MCV RBC AUTO: 88 FL (ref 82–98)
MODE: ABNORMAL
MONOCYTES # BLD AUTO: 0.7 K/UL (ref 0.3–1)
MONOCYTES NFR BLD: 5.5 % (ref 4–15)
NEUTROPHILS # BLD AUTO: 11.1 K/UL (ref 1.8–7.7)
NEUTROPHILS NFR BLD: 91.1 % (ref 38–73)
NRBC BLD-RTO: 0 /100 WBC
NUM UNITS TRANS PACKED RBC: NORMAL
PCO2 BLDA: 42.4 MMHG (ref 35–45)
PH SMN: 7.32 [PH] (ref 7.35–7.45)
PHOSPHATE SERPL-MCNC: 2.2 MG/DL (ref 2.7–4.5)
PLATELET # BLD AUTO: 379 K/UL (ref 150–450)
PMV BLD AUTO: 9.7 FL (ref 9.2–12.9)
PO2 BLDA: 24 MMHG (ref 40–60)
PO2 BLDA: 36 MMHG (ref 40–60)
POC BE: -4 MMOL/L
POC SATURATED O2: 41 % (ref 95–100)
POC SATURATED O2: 65 % (ref 95–100)
POC TCO2: 23 MMOL/L (ref 24–29)
POCT GLUCOSE: 109 MG/DL (ref 70–110)
POCT GLUCOSE: 117 MG/DL (ref 70–110)
POCT GLUCOSE: 137 MG/DL (ref 70–110)
POCT GLUCOSE: 178 MG/DL (ref 70–110)
POTASSIUM SERPL-SCNC: 3.7 MMOL/L (ref 3.5–5.1)
PREALB SERPL-MCNC: 15 MG/DL (ref 20–43)
PROT SERPL-MCNC: 7 G/DL (ref 6–8.4)
PROTHROMBIN TIME: 19 SEC (ref 9–12.5)
RBC # BLD AUTO: 2.37 M/UL (ref 4.6–6.2)
SAMPLE: ABNORMAL
SAMPLE: ABNORMAL
SITE: ABNORMAL
SITE: ABNORMAL
SODIUM SERPL-SCNC: 131 MMOL/L (ref 136–145)
SP02: 98
SPECIMEN OUTDATE: NORMAL
WBC # BLD AUTO: 12.21 K/UL (ref 3.9–12.7)

## 2024-06-17 PROCEDURE — 25000003 PHARM REV CODE 250: Performed by: NURSE PRACTITIONER

## 2024-06-17 PROCEDURE — 84134 ASSAY OF PREALBUMIN: CPT | Performed by: STUDENT IN AN ORGANIZED HEALTH CARE EDUCATION/TRAINING PROGRAM

## 2024-06-17 PROCEDURE — 99900035 HC TECH TIME PER 15 MIN (STAT)

## 2024-06-17 PROCEDURE — 85610 PROTHROMBIN TIME: CPT | Performed by: STUDENT IN AN ORGANIZED HEALTH CARE EDUCATION/TRAINING PROGRAM

## 2024-06-17 PROCEDURE — 25000003 PHARM REV CODE 250: Performed by: STUDENT IN AN ORGANIZED HEALTH CARE EDUCATION/TRAINING PROGRAM

## 2024-06-17 PROCEDURE — 63600175 PHARM REV CODE 636 W HCPCS: Performed by: STUDENT IN AN ORGANIZED HEALTH CARE EDUCATION/TRAINING PROGRAM

## 2024-06-17 PROCEDURE — 99292 CRITICAL CARE ADDL 30 MIN: CPT | Mod: ,,, | Performed by: INTERNAL MEDICINE

## 2024-06-17 PROCEDURE — 99232 SBSQ HOSP IP/OBS MODERATE 35: CPT | Mod: ,,, | Performed by: PHYSICIAN ASSISTANT

## 2024-06-17 PROCEDURE — 94761 N-INVAS EAR/PLS OXIMETRY MLT: CPT

## 2024-06-17 PROCEDURE — 21400001 HC TELEMETRY ROOM

## 2024-06-17 PROCEDURE — 30233N1 TRANSFUSION OF NONAUTOLOGOUS RED BLOOD CELLS INTO PERIPHERAL VEIN, PERCUTANEOUS APPROACH: ICD-10-PCS | Performed by: INTERNAL MEDICINE

## 2024-06-17 PROCEDURE — 84100 ASSAY OF PHOSPHORUS: CPT | Performed by: STUDENT IN AN ORGANIZED HEALTH CARE EDUCATION/TRAINING PROGRAM

## 2024-06-17 PROCEDURE — 85025 COMPLETE CBC W/AUTO DIFF WBC: CPT | Performed by: STUDENT IN AN ORGANIZED HEALTH CARE EDUCATION/TRAINING PROGRAM

## 2024-06-17 PROCEDURE — P9016 RBC LEUKOCYTES REDUCED: HCPCS | Performed by: REGISTERED NURSE

## 2024-06-17 PROCEDURE — 63600175 PHARM REV CODE 636 W HCPCS: Performed by: INTERNAL MEDICINE

## 2024-06-17 PROCEDURE — 80048 BASIC METABOLIC PNL TOTAL CA: CPT | Performed by: STUDENT IN AN ORGANIZED HEALTH CARE EDUCATION/TRAINING PROGRAM

## 2024-06-17 PROCEDURE — 27000221 HC OXYGEN, UP TO 24 HOURS

## 2024-06-17 PROCEDURE — 27000248 HC VAD-ADDITIONAL DAY

## 2024-06-17 PROCEDURE — 86901 BLOOD TYPING SEROLOGIC RH(D): CPT | Performed by: INTERNAL MEDICINE

## 2024-06-17 PROCEDURE — 80100014 HC HEMODIALYSIS 1:1

## 2024-06-17 PROCEDURE — 86140 C-REACTIVE PROTEIN: CPT | Performed by: STUDENT IN AN ORGANIZED HEALTH CARE EDUCATION/TRAINING PROGRAM

## 2024-06-17 PROCEDURE — 25000003 PHARM REV CODE 250: Performed by: REGISTERED NURSE

## 2024-06-17 PROCEDURE — 99233 SBSQ HOSP IP/OBS HIGH 50: CPT | Mod: ,,, | Performed by: INTERNAL MEDICINE

## 2024-06-17 PROCEDURE — 99291 CRITICAL CARE FIRST HOUR: CPT | Mod: FS,,, | Performed by: REGISTERED NURSE

## 2024-06-17 PROCEDURE — 83735 ASSAY OF MAGNESIUM: CPT | Performed by: STUDENT IN AN ORGANIZED HEALTH CARE EDUCATION/TRAINING PROGRAM

## 2024-06-17 PROCEDURE — 82803 BLOOD GASES ANY COMBINATION: CPT

## 2024-06-17 PROCEDURE — 83880 ASSAY OF NATRIURETIC PEPTIDE: CPT | Performed by: STUDENT IN AN ORGANIZED HEALTH CARE EDUCATION/TRAINING PROGRAM

## 2024-06-17 PROCEDURE — 86850 RBC ANTIBODY SCREEN: CPT | Performed by: INTERNAL MEDICINE

## 2024-06-17 PROCEDURE — 86920 COMPATIBILITY TEST SPIN: CPT | Performed by: REGISTERED NURSE

## 2024-06-17 PROCEDURE — 25000003 PHARM REV CODE 250: Performed by: INTERNAL MEDICINE

## 2024-06-17 PROCEDURE — 85730 THROMBOPLASTIN TIME PARTIAL: CPT | Performed by: STUDENT IN AN ORGANIZED HEALTH CARE EDUCATION/TRAINING PROGRAM

## 2024-06-17 PROCEDURE — 83090 ASSAY OF HOMOCYSTEINE: CPT | Performed by: REGISTERED NURSE

## 2024-06-17 PROCEDURE — 83921 ORGANIC ACID SINGLE QUANT: CPT | Performed by: REGISTERED NURSE

## 2024-06-17 PROCEDURE — 83615 LACTATE (LD) (LDH) ENZYME: CPT | Performed by: STUDENT IN AN ORGANIZED HEALTH CARE EDUCATION/TRAINING PROGRAM

## 2024-06-17 PROCEDURE — 27000923 HC TRIALYSIS CATHETER, ANY SIZE

## 2024-06-17 PROCEDURE — 80076 HEPATIC FUNCTION PANEL: CPT | Performed by: STUDENT IN AN ORGANIZED HEALTH CARE EDUCATION/TRAINING PROGRAM

## 2024-06-17 PROCEDURE — 63600175 PHARM REV CODE 636 W HCPCS: Performed by: REGISTERED NURSE

## 2024-06-17 RX ORDER — HYDROCODONE BITARTRATE AND ACETAMINOPHEN 500; 5 MG/1; MG/1
TABLET ORAL
Status: DISCONTINUED | OUTPATIENT
Start: 2024-06-17 | End: 2024-07-01 | Stop reason: HOSPADM

## 2024-06-17 RX ORDER — POTASSIUM CHLORIDE 14.9 MG/ML
20 INJECTION INTRAVENOUS ONCE
Status: COMPLETED | OUTPATIENT
Start: 2024-06-17 | End: 2024-06-17

## 2024-06-17 RX ORDER — SODIUM CHLORIDE 9 MG/ML
INJECTION, SOLUTION INTRAVENOUS ONCE
Status: DISCONTINUED | OUTPATIENT
Start: 2024-06-17 | End: 2024-06-18

## 2024-06-17 RX ORDER — ACETAMINOPHEN 325 MG/1
650 TABLET ORAL EVERY 6 HOURS PRN
Status: DISCONTINUED | OUTPATIENT
Start: 2024-06-17 | End: 2024-07-01 | Stop reason: HOSPADM

## 2024-06-17 RX ORDER — NOREPINEPHRINE BITARTRATE/D5W 4MG/250ML
0-3 PLASTIC BAG, INJECTION (ML) INTRAVENOUS CONTINUOUS
Status: DISCONTINUED | OUTPATIENT
Start: 2024-06-17 | End: 2024-06-19

## 2024-06-17 RX ADMIN — AMPICILLIN 2 G: 2 INJECTION, POWDER, FOR SOLUTION INTRAMUSCULAR; INTRAVENOUS at 06:06

## 2024-06-17 RX ADMIN — DEXMEDETOMIDINE HYDROCHLORIDE 0.4 MCG/KG/HR: 4 INJECTION INTRAVENOUS at 08:06

## 2024-06-17 RX ADMIN — ACETAMINOPHEN 650 MG: 325 TABLET ORAL at 02:06

## 2024-06-17 RX ADMIN — DEXMEDETOMIDINE HYDROCHLORIDE 0.4 MCG/KG/HR: 4 INJECTION INTRAVENOUS at 03:06

## 2024-06-17 RX ADMIN — PIPERACILLIN SODIUM AND TAZOBACTAM SODIUM 4.5 G: 4; .5 INJECTION, POWDER, FOR SOLUTION INTRAVENOUS at 06:06

## 2024-06-17 RX ADMIN — POTASSIUM CHLORIDE 20 MEQ: 200 INJECTION, SOLUTION INTRAVENOUS at 06:06

## 2024-06-17 RX ADMIN — QUETIAPINE FUMARATE 25 MG: 25 TABLET ORAL at 10:06

## 2024-06-17 RX ADMIN — ATORVASTATIN CALCIUM 40 MG: 40 TABLET, FILM COATED ORAL at 10:06

## 2024-06-17 RX ADMIN — WARFARIN SODIUM 2.5 MG: 2.5 TABLET ORAL at 05:06

## 2024-06-17 RX ADMIN — PANTOPRAZOLE SODIUM 40 MG: 40 TABLET, DELAYED RELEASE ORAL at 02:06

## 2024-06-17 RX ADMIN — NOREPINEPHRINE BITARTRATE 0.02 MCG/KG/MIN: 4 INJECTION, SOLUTION INTRAVENOUS at 10:06

## 2024-06-17 RX ADMIN — FOLIC ACID 1 MG: 1 TABLET ORAL at 02:06

## 2024-06-17 RX ADMIN — ACETAMINOPHEN 650 MG: 325 TABLET ORAL at 01:06

## 2024-06-17 RX ADMIN — VENLAFAXINE 37.5 MG: 37.5 TABLET ORAL at 02:06

## 2024-06-17 RX ADMIN — PIPERACILLIN SODIUM AND TAZOBACTAM SODIUM 4.5 G: 4; .5 INJECTION, POWDER, FOR SOLUTION INTRAVENOUS at 10:06

## 2024-06-17 RX ADMIN — AMIODARONE HYDROCHLORIDE 400 MG: 200 TABLET ORAL at 02:06

## 2024-06-17 RX ADMIN — CHOLECALCIFEROL TAB 25 MCG (1000 UNIT) 1000 UNITS: 25 TAB at 02:06

## 2024-06-17 NOTE — PROGRESS NOTES
[facility] - Cardiac Intensive Care  Endocrinology  Progress Note    Admit Date: 6/11/2024     Reason for Consult: Management of T2DM, Hyperglycemia     Surgical Procedure and Date: HM3 placement as DT (12/1/23)     Diabetes diagnosis year: several years ago    Home Diabetes Medications:  Amaryl 1 mg (recently prescribed by PCP about a week ago)     Lab Results   Component Value Date    HGBA1C 7.4 (H) 04/17/2024       How often checking glucose at home? Once daily in the AM ( prior to starting Amaryl BG in the 300s, since starting Amaryl BG 70-130s)   BG readings on regimen: 70-130s  Hypoglycemia on the regimen?  No  Missed doses on regimen?  No    Diabetes Complications include:     Hypoglycemia , Diabetic nephropathy  , Diabetic chronic kidney disease     , and Diabetic peripheral neuropathy     Complicating diabetes co morbidities:   CHF and CKD      HPI:   Patient is a 64 y.o. male with a diagnosis of stage D HFrEF, ICMP who underwent HM3 placement as DT (12/1/23) w/ a hospital stay complicated by vasoplegia(requiring Giaprezza), debility, malnutrition/impaired swallowing, and renal failure requiring HD (since weaned off) w/ recent discharge for ADHF presented to the ED with wife due to c/o worsening generalized weakness since 6/10. Per wife patient was mostly sleeping during the day despite sleeping 10 hrs at night. Patient also admits decreased appetite and decreased urine output despite taking his medications as prescribed. He has chronic shortness of breath on exertion. Denies confusion, syncope, diarrhea, constipation, N/V, dysuria, or other complaints. Patient had his prior tunnel cath removed used for HD about 2 weeks ago. He completed a 6 week therapy for Enterococcus bacteremia with ampicillin and rocephin ending 5/30/24 and was switched to PO antibiotics. Off note, patient was recently started by his PCP on glimepiride 1mg and unclear of BG trends during the day. Per patient last fasting  on 6/11.  "Patient reports lasting taking the glimepiride on  AM.   Admitted for worsening renal function with a BUN>100/Cr. 9.9. Endocrinology consulted for management of T2DM.          Interval HPI:   No acute events overnight. Patient in room [id number] A. Blood glucose stable. BG at and below goal on current insulin regimen (None). Steroid use- None .   Renal function- Abnormal -    Vasopressors-  None      Diet Renal Coumadin Restriction; Fluid - 2000mL      Eatin%  Nausea: No  Hypoglycemia and intervention: Yes D 10 yesterday  Fever: No  TPN and/or TF: No    BP (!) 80/51 (BP Location: Right arm, Patient Position: Lying)   Pulse 78   Temp 98.8 °F (37.1 °C) (Oral)   Resp 20   Ht 6' 1" (1.854 m)   Wt 78.6 kg (173 lb 4.5 oz)   SpO2 99%   BMI 22.86 kg/m²     Labs Reviewed and Include    Recent Labs   Lab 24  0319   GLU 96   CALCIUM 8.7   ALBUMIN 2.3*   PROT 7.0   *   K 3.7   CO2 22*   CL 96   BUN 22   CREATININE 2.4*   ALKPHOS 287*   *   *   BILITOT 0.7     Lab Results   Component Value Date    WBC 12.21 2024    HGB 6.5 (L) 2024    HCT 20.9 (L) 2024    MCV 88 2024     2024     No results for input(s): "TSH", "FREET4" in the last 168 hours.  Lab Results   Component Value Date    HGBA1C 7.4 (H) 2024       Nutritional status:   Body mass index is 22.86 kg/m².  Lab Results   Component Value Date    ALBUMIN 2.3 (L) 2024    ALBUMIN 2.5 (L) 2024    ALBUMIN 2.5 (L) 06/15/2024     Lab Results   Component Value Date    PREALBUMIN 15 (L) 2024    PREALBUMIN 22 2024    PREALBUMIN 25 2024       Estimated Creatinine Clearance: 35.5 mL/min (A) (based on SCr of 2.4 mg/dL (H)).    Accu-Checks  Recent Labs     24  1318 24  2211 06/15/24  0821 06/15/24  1302 06/15/24  1817 24  0759 24  1216 24  1609 24  1734 24   POCTGLUCOSE 129* 95 73 66* 90 67* 73 78 82 98       Current " Medications and/or Treatments Impacting Glycemic Control  Immunotherapy:    Immunosuppressants       None          Steroids:   Hormones (From admission, onward)      None          Pressors:    Autonomic Drugs (From admission, onward)      None          Hyperglycemia/Diabetes Medications:   Antihyperglycemics (From admission, onward)      Start     Stop Route Frequency Ordered    06/14/24 0910  insulin aspart U-100 pen 0-5 Units         -- SubQ Before meals & nightly PRN 06/14/24 0811            ASSESSMENT and PLAN    Cardiac/Vascular  LVAD (left ventricular assist device) present  Managed per primary team  Avoid hypoglycemia        Renal/  * Acute renal failure superimposed on stage 4 chronic kidney disease  Titrate insulin slowly to avoid hypoglycemia as the risk of hypoglycemia increases with decreased creatinine clearance.        Endocrine  Type 2 diabetes mellitus without complication, without long-term current use of insulin  BG goal 140-180  T2DM.  LVAD. Admitted for worsening renal function with a BUN>100/Cr. 9.9. Hypoglycemia likely secondary to RIVAS (glimepiride) with worsened kidney function. Last took on 6/11. D10 drip stopped and blood sugar downtrended w/ lows requiring D10. D10 restarted at 25 mL/hr w/ stable bg    Plan:  -Recommend D10 infusion at 25mL/hr, but will defer to primary for fluid management.  -Novolog /50  -BG monitoring ac/hs  -Hypoglycemia protocol in place    ** Please call Endocrine for any BG related issues **    Discharge plans:   [facility]  -D/C Camila Wilson PA-C  Endocrinology  [facility] - Cardiac Intensive Care

## 2024-06-17 NOTE — PLAN OF CARE
Cardiac ICU Care Plan    POC reviewed with Radha Abbott and family. Questions and concerns addressed. No acute events today. Pt progressing toward goals. Will continue to monitor. See below and flowsheets for full assessment and VS info.     -CVP 4, 7, 6  -SVO2 63%  -Trialysis line used for IV meds d/t patient being unable to take PO meds. MD Ravi Mcdonald notified    Neuro:  [place] Coma Scale  Best Eye Response: 3-->(E3) to speech  Best Motor Response: 5-->(M5) localizes pain  Best Verbal Response: 3-->(V3) inappropriate words  [place] Coma Scale Score: 11  Assessment Qualifiers: patient not sedated/intubated, no eye obstruction present  Pupil PERRLA: yes    24 hr Temp:  [98.6 °F (37 °C)-100 °F (37.8 °C)]      CV:  Rhythm: normal sinus rhythm   DVT prophylaxis: VTE Required Core Measure: Pharmacological prophylaxis initiated/maintained    CVP (mean): (S) 6 mmHg (06/17/24 0301)       SVO2 (%): (S) 63 % (06/16/24 1901)       Type: HeartMate3       Pulses  Right Radial Pulse: 1+ (weak)  Left Radial Pulse: 1+ (weak)  Right Dorsalis Pedis Pulse: 1+ (weak)  Left Dorsalis Pedis Pulse: 1+ (weak)  Right Posterior Tibial Pulse: 1+ (weak)  Left Posterior Tibial Pulse: 1+ (weak)    Resp:  Flow (L/min) (Oxygen Therapy): 3  Oxygen Concentration (%): 32    GI/:  GI prophylaxis: no  Diet/Nutrition Received: no added salt, restrict fluids  Last Bowel Movement: 06/16/24  Voiding Characteristics: oliguria, patient on CRRT   Intake/Output Summary (Last 24 hours) at 6/17/2024 0644  Last data filed at 6/17/2024 0601  Gross per 24 hour   Intake 2088.28 ml   Output 2152 ml   Net -63.72 ml         Labs/Accuchecks:  Recent Labs   Lab 06/15/24  0234 06/15/24  0810 06/15/24  1247 06/16/24  0352 06/17/24  0319   WBC 11.96  --   --  13.49* 12.21   RBC 2.21*  --   --  2.38* 2.37*   HGB 6.3*  --   --  6.6* 6.5*   HCT 19.5*   < > 22* 20.7* 20.9*     --   --  377 379    < > = values in this interval not displayed.      Recent Labs   Lab  06/15/24  0234 06/16/24  0352 06/17/24  0319   INR 3.4* 2.6* 1.8*   APTT 50.4* 43.9* 40.2*      Recent Labs     06/17/24  0319   *   K 3.7   CO2 22*   CL 96   BUN 22   CREATININE 2.4*   ALKPHOS 287*   *   *   BILITOT 0.7       Recent Labs   Lab 06/11/24  1933   TROPONINI 0.051*      Recent Labs     06/15/24  1912 06/15/24  1917 06/16/24  0801 06/16/24  1612 06/16/24 1959   PH 7.494* 7.426  --   --  7.440   PCO2 30.7* 38.0  --   --  36.7   PO2 79* 27* 24* 24* 31*   HCO3 23.6* 25.0  --   --  25.0   POCSATURATED 97 53 47 46 63   BE 0 1  --   --  1       Electrolytes: Electrolytes replaced  Accuchecks: ACHS    Gtts/LDAs:   sodium chloride 0.9%   Intravenous Continuous   Stopped at 06/13/24 0117    dexmedeTOMIDine (Precedex) infusion (titrating)  0-1.4 mcg/kg/hr Intravenous Continuous 7.96 mL/hr at 06/17/24 0601 0.4 mcg/kg/hr at 06/17/24 0601    dextrose 10 % in water (D10W)   Intravenous Continuous 25 mL/hr at 06/17/24 0601 Rate Verify at 06/17/24 0601       Lines/Drains/Airways       Central Venous Catheter Line  Duration             Trialysis (Dialysis) Catheter 06/12/24 0124 right internal jugular 5 days              Line  Duration                  VAD 12/01/23 1015 Left ventricular assist device HeartMate 3 198 days              Peripheral Intravenous Line  Duration                  Midline Catheter - Single Lumen 06/13/24 1623 Left basilic vein (medial side of arm) 20g x 10cm 3 days                    Skin/Wounds  Bathing/Skin Care: bath, complete;linen changed;electrode patches/site rotation;dressed/undressed (06/16/24 1601)  Wounds: No  Wound care consulted: No    Problem: Adult Inpatient Plan of Care  Goal: Plan of Care Review  Outcome: Progressing  Goal: Patient-Specific Goal (Individualized)  Outcome: Progressing  Goal: Absence of Hospital-Acquired Illness or Injury  Outcome: Progressing  Goal: Optimal Comfort and Wellbeing  Outcome: Progressing  Goal: Readiness for Transition of  Care  Outcome: Progressing     Problem: Infection  Goal: Absence of Infection Signs and Symptoms  Outcome: Progressing     Problem: Diabetes Comorbidity  Goal: Blood Glucose Level Within Targeted Range  Outcome: Progressing     Problem: Acute Kidney Injury/Impairment  Goal: Fluid and Electrolyte Balance  Outcome: Progressing  Goal: Improved Oral Intake  Outcome: Progressing  Goal: Effective Renal Function  Outcome: Progressing     Problem: Pneumonia  Goal: Fluid Balance  Outcome: Progressing  Goal: Resolution of Infection Signs and Symptoms  Outcome: Progressing  Goal: Effective Oxygenation and Ventilation  Outcome: Progressing     Problem: Skin Injury Risk Increased  Goal: Skin Health and Integrity  Outcome: Progressing     Problem: CRRT (Continuous Renal Replacement Therapy)  Goal: Safe, Effective Therapy Delivery  Outcome: Progressing  Goal: Hemodynamic Stability  Outcome: Progressing  Goal: Body Temperature Maintained in Desired Range  Outcome: Progressing  Goal: Absence of Infection Signs and Symptoms  Outcome: Progressing     Problem: Ventricular Assist Device  Goal: Optimal Adjustment to Device  Outcome: Progressing  Goal: Absence of Bleeding  Outcome: Progressing  Goal: Absence of Embolism Signs and Symptoms  Outcome: Progressing  Goal: Optimal Blood Flow  Outcome: Progressing  Goal: Absence of Infection Signs and Symptoms  Outcome: Progressing  Goal: Effective Right-Sided Heart Function  Outcome: Progressing     Problem: Ventricular Assist Device  Goal: Optimal Adjustment to Device  Outcome: Progressing  Goal: Absence of Bleeding  Outcome: Progressing  Goal: Absence of Embolism Signs and Symptoms  Outcome: Progressing  Goal: Optimal Blood Flow  Outcome: Progressing  Goal: Absence of Infection Signs and Symptoms  Outcome: Progressing  Goal: Effective Right-Sided Heart Function  Outcome: Progressing     Problem: Fall Injury Risk  Goal: Absence of Fall and Fall-Related Injury  Outcome: Progressing     Problem:  Hemodialysis  Goal: Safe, Effective Therapy Delivery  Outcome: Progressing  Goal: Effective Tissue Perfusion  Outcome: Progressing  Goal: Absence of Infection Signs and Symptoms  Outcome: Progressing     Problem: Restraint, Nonviolent  Goal: Absence of Harm or Injury  Outcome: Progressing

## 2024-06-17 NOTE — SUBJECTIVE & OBJECTIVE
"Interval HPI:   No acute events overnight. Patient in room [id number] A. Blood glucose stable. BG at and below goal on current insulin regimen (None). Steroid use- None .   Renal function- Abnormal -    Vasopressors-  None      Diet Renal Coumadin Restriction; Fluid - 2000mL      Eatin%  Nausea: No  Hypoglycemia and intervention: Yes D 10 yesterday  Fever: No  TPN and/or TF: No    BP (!) 80/51 (BP Location: Right arm, Patient Position: Lying)   Pulse 78   Temp 98.8 °F (37.1 °C) (Oral)   Resp 20   Ht 6' 1" (1.854 m)   Wt 78.6 kg (173 lb 4.5 oz)   SpO2 99%   BMI 22.86 kg/m²     Labs Reviewed and Include    Recent Labs   Lab 24  0319   GLU 96   CALCIUM 8.7   ALBUMIN 2.3*   PROT 7.0   *   K 3.7   CO2 22*   CL 96   BUN 22   CREATININE 2.4*   ALKPHOS 287*   *   *   BILITOT 0.7     Lab Results   Component Value Date    WBC 12.21 2024    HGB 6.5 (L) 2024    HCT 20.9 (L) 2024    MCV 88 2024     2024     No results for input(s): "TSH", "FREET4" in the last 168 hours.  Lab Results   Component Value Date    HGBA1C 7.4 (H) 2024       Nutritional status:   Body mass index is 22.86 kg/m².  Lab Results   Component Value Date    ALBUMIN 2.3 (L) 2024    ALBUMIN 2.5 (L) 2024    ALBUMIN 2.5 (L) 06/15/2024     Lab Results   Component Value Date    PREALBUMIN 15 (L) 2024    PREALBUMIN 22 2024    PREALBUMIN 25 2024       Estimated Creatinine Clearance: 35.5 mL/min (A) (based on SCr of 2.4 mg/dL (H)).    Accu-Checks  Recent Labs     24  1318 24  2211 06/15/24  0821 06/15/24  1302 06/15/24  1817 24  0759 24  1216 24  1609 24  1734 24  195   POCTGLUCOSE 129* 95 73 66* 90 67* 73 78 82 98       Current Medications and/or Treatments Impacting Glycemic Control  Immunotherapy:    Immunosuppressants       None          Steroids:   Hormones (From admission, onward)      None      "     Pressors:    Autonomic Drugs (From admission, onward)      None          Hyperglycemia/Diabetes Medications:   Antihyperglycemics (From admission, onward)      Start     Stop Route Frequency Ordered    06/14/24 0910  insulin aspart U-100 pen 0-5 Units         -- SubQ Before meals & nightly PRN 06/14/24 0811

## 2024-06-17 NOTE — PROGRESS NOTES
Rounded on patient at bedside. Patient is disoriented and experiencing delirium, he is in restraints at this time. Pt's wife denies any needs at this time. Patient's wife reported no needs. Wife asked about getting a dialysis center in [place]. Explained that the process for getting dialysis with a  VAD set up is difficult and can take some time but we are working through the process. Encouraged pt to notify nurse if they have any questions, problems or concerns for LVAD coordinator.  Pt verbalized understanding and in agreement of plan. Plan for team to continue to round on patient.

## 2024-06-17 NOTE — PROGRESS NOTES
06/16/2024  Deni Frye    Current provider:  Sajan Hurley, *    Device interrogation:      6/16/2024     8:01 AM 6/16/2024     7:01 AM 6/16/2024     6:01 AM 6/16/2024     5:01 AM 6/16/2024     4:01 AM 6/16/2024     3:01 AM 6/16/2024     2:01 AM   TXP LVAD INTERROGATIONS   Type HeartMate3 HeartMate3 HeartMate3 HeartMate3 HeartMate3 HeartMate3 HeartMate3   Flow 4.4 4.3 4.5 4.4 4.3 4.3 4.3   Speed 5050 5000 5000 5000 5000 5000 5000   PI 3.8 3.8 3.7 3.8 3.7 3.7 3.9   Power ([place]) 3.4 3.4 3.4 3.4 3.4 3.4 3.5   LSL 4600 4600 4600 4600 4600 4600 4600   Pulsatility Intermittent pulse Intermittent pulse Intermittent pulse Intermittent pulse Intermittent pulse Intermittent pulse Intermittent pulse          Rounded on [facility] to ensure all mechanical assist device settings (IABP or VAD) were appropriate and all parameters were within limits.  I was able to ensure all back up equipment was present, the staff had no issues, and the Perfusion Department daily rounding was complete.      For implantable VADs: Interrogation of Ventricular assist device was performed with analysis of device parameters and review of device function. I have personally reviewed the interrogation findings and agree with findings as stated.     In emergency, the nursing units have been notified to contact the perfusion department either by:  Calling [phone] from 630am to 4pm Mon thru Fri, utilizing the On-Call Finder functionality of Epic and searching for Perfusion, or by contacting the hospital  from 4pm to 630am and on weekends and asking to speak with the perfusionist on call.    7:04 PM

## 2024-06-17 NOTE — PROGRESS NOTES
HD completed,  Catheter ports flushed with normal saline and ports capped and secured.. post B/P 98/51, pulse 72 . No fluid removed  patient kept even as ordered. One unit  packed red cells given during HD

## 2024-06-17 NOTE — PROGRESS NOTES
[facility] - Cardiac Intensive Care  Heart Transplant  Progress Note    Patient Name: Radha Abbott  MRN: [id number]  Admission Date: 6/11/2024  Hospital Length of Stay: 6 days  Attending Physician: Sajan Hurley, *  Primary Care Provider: Vasu Kong MD  Principal Problem:Acute renal failure superimposed on stage 4 chronic kidney disease    Subjective:   Interval History: Rested on Precedex overnight. Still delirious this AM. Incoherent and doesn't follow commands. WBC 12 today. Tmax yesterday noted to be 100. Imaging with concern for pna. Starting broad spectrum Zosyn. Obtain sputum cx if able. Placing feeding tube for nutrition and meds.     Continuous Infusions:   sodium chloride 0.9%   Intravenous Continuous   Stopped at 06/13/24 0117    dexmedeTOMIDine (Precedex) infusion (titrating)  0-1.4 mcg/kg/hr Intravenous Continuous   Stopped at 06/17/24 0655    dextrose 10 % in water (D10W)   Intravenous Continuous 25 mL/hr at 06/17/24 0701 Rate Verify at 06/17/24 0701     Scheduled Meds:   amiodarone  400 mg Oral Daily    amLODIPine  5 mg Oral Daily    atorvastatin  40 mg Oral QHS    folic acid  1 mg Oral Daily    hydrALAZINE  100 mg Oral Q8H    LIDOcaine  2 patch Transdermal Q24H    pantoprazole  40 mg Oral Daily    piperacillin-tazobactam (Zosyn) IV (PEDS and ADULTS) (extended infusion is not appropriate)  4.5 g Intravenous Q8H    QUEtiapine  25 mg Oral QHS    venlafaxine  37.5 mg Oral Daily    vitamin D  1,000 Units Oral Daily    warfarin  2.5 mg Oral Daily     PRN Meds:  Current Facility-Administered Medications:     sodium chloride 0.9%, , Intravenous, PRN    acetaminophen, 650 mg, Oral, Q6H PRN    dextrose 10%, 12.5 g, Intravenous, PRN    dextrose 10%, 25 g, Intravenous, PRN    glucagon (human recombinant), 1 mg, Intramuscular, PRN    glucose, 16 g, Oral, PRN    glucose, 24 g, Oral, PRN    insulin aspart U-100, 0-5 Units, Subcutaneous, QID (AC + HS) PRN    simethicone, 1 tablet, Oral, TID PRN     traZODone, 25 mg, Oral, Nightly PRN    Review of patient's allergies indicates:  No Known Allergies  Objective:     Vital Signs (Most Recent):  Temp: 98 °F (36.7 °C) (06/17/24 0701)  Pulse: 87 (06/17/24 0801)  Resp: (!) 38 (06/17/24 0801)  BP: (!) 78/57 (06/17/24 0801)  SpO2: 97 % (06/17/24 0801) Vital Signs (24h Range):  Temp:  [98 °F (36.7 °C)-98.8 °F (37.1 °C)] 98 °F (36.7 °C)  Pulse:  [] 87  Resp:  [18-39] 38  SpO2:  [96 %-100 %] 97 %  BP: ()/(0-89) 78/57     Patient Vitals for the past 72 hrs (Last 3 readings):   Weight   06/17/24 0501 78.6 kg (173 lb 4.5 oz)   06/16/24 0501 79.6 kg (175 lb 7.8 oz)   06/15/24 1127 79.8 kg (176 lb)     Body mass index is 22.86 kg/m².      Intake/Output Summary (Last 24 hours) at 6/17/2024 1144  Last data filed at 6/17/2024 0745  Gross per 24 hour   Intake 1265.06 ml   Output 0 ml   Net 1265.06 ml         Telemetry: SR       Physical Exam  Constitutional:       Appearance: Normal appearance.   HENT:      Head: Normocephalic and atraumatic.   Eyes:      Conjunctiva/sclera: Conjunctivae normal.      Pupils: Pupils are equal, round, and reactive to light.   Neck:      Comments: RIJ trialysis cath.   Cardiovascular:      Rate and Rhythm: Normal rate and regular rhythm.      Comments: Smooth VAD hum  Pulmonary:      Effort: Pulmonary effort is normal.      Breath sounds: Normal breath sounds.   Abdominal:      General: Bowel sounds are normal.      Palpations: Abdomen is soft.   Musculoskeletal:         General: No swelling. Normal range of motion.      Cervical back: Normal range of motion and neck supple.   Skin:     General: Skin is warm and dry.      Capillary Refill: Capillary refill takes 2 to 3 seconds.   Neurological:      General: No focal deficit present.      Mental Status: He is alert and oriented to person, place, and time.            Significant Labs:  CBC:  Recent Labs   Lab 06/15/24  0234 06/15/24  0810 06/15/24  1247 06/16/24  0352 06/17/24  0319   WBC  11.96  --   --  13.49* 12.21   RBC 2.21*  --   --  2.38* 2.37*   HGB 6.3*  --   --  6.6* 6.5*   HCT 19.5*   < > 22* 20.7* 20.9*     --   --  377 379   MCV 88  --   --  87 88   MCH 28.5  --   --  27.7 27.4   MCHC 32.3  --   --  31.9* 31.1*    < > = values in this interval not displayed.     BNP:  Recent Labs   Lab 06/11/24  1933 06/14/24 0221 06/17/24 0319   BNP 1,388* 1,542* 1,023*     CMP:  Recent Labs   Lab 06/14/24 0221 06/14/24  1313 06/15/24  0234 06/15/24  1401 06/15/24  2245 06/16/24  0352 06/16/24  1734 06/17/24 0319   *  129*  129*   < > 77  77  77  77   < > 69* 54*  54* 77 96   CALCIUM 9.0  9.0  9.0   < > 8.9  8.9  8.9  8.9   < > 8.9 8.7  8.7 8.6* 8.7   ALBUMIN 2.5*  2.5*  2.5*   < > 2.5*  2.5*  2.5*  2.5*   < > 2.5* 2.5*  --  2.3*   PROT 7.6  --  7.8  --   --   --   --  7.0   *  129*  129*   < > 129*  129*  129*  129*   < > 129* 130*  130* 130* 131*   K 4.7  4.7  4.7   < > 4.6  4.6  4.6  4.6   < > 4.3 4.0  4.0 3.5 3.7   CO2 17*  17*  17*   < > 18*  18*  18*  18*   < > 22* 22*  22* 22* 22*     100  100   < > 99  99  99  99   < > 96 96  96 95 96   BUN 30*  30*  30*   < > 11  11  11  11   < > 7* 6*  6* 13 22   CREATININE 3.3*  3.3*  3.3*   < > 1.3  1.3  1.3  1.3   < > 0.8 0.8  0.8 1.6* 2.4*   ALKPHOS 165*  --  253*  --   --   --   --  287*   ALT 65*  --  148*  --   --   --   --  285*   AST 64*  --  207*  --   --   --   --  325*   BILITOT 0.4  --  0.7  --   --   --   --  0.7    < > = values in this interval not displayed.      Coagulation:   Recent Labs   Lab 06/15/24  0234 06/16/24  0352 06/17/24  0319   INR 3.4* 2.6* 1.8*   APTT 50.4* 43.9* 40.2*     LDH:  Recent Labs   Lab 06/15/24  0234 06/16/24  0352 06/17/24  0319   * 939* 468*     Microbiology:  Microbiology Results (last 7 days)       Procedure Component Value Units Date/Time    Culture, Respiratory with Gram Stain [[id number]]     Order Status: No result  Specimen: Respiratory     Blood culture #1 **CANNOT BE ORDERED STAT** [[id number]] Collected: 06/11/24 2122    Order Status: Completed Specimen: Blood from Peripheral, Antecubital, Right Updated: 06/16/24 2212     Blood Culture, Routine No growth after 5 days.    Blood culture #2 **CANNOT BE ORDERED STAT** [[id number]] Collected: 06/11/24 2121    Order Status: Completed Specimen: Blood from Peripheral, Hand, Left Updated: 06/16/24 2212     Blood Culture, Routine No growth after 5 days.    Urine culture [[id number]] Collected: 06/12/24 1422    Order Status: Completed Specimen: Urine Updated: 06/13/24 2113     Urine Culture, Routine No growth    Narrative:      Specimen Source->Urine            I have reviewed all pertinent labs within the past 24 hours.    Estimated Creatinine Clearance: 35.5 mL/min (A) (based on SCr of 2.4 mg/dL (H)).    Diagnostic Results:  I have reviewed and interpreted all pertinent imaging results/findings within the past 24 hours.  Assessment and Plan:     Mr. Radha Abbott is a 63 yo male with stage D HFrEF, ICMP who underwent HM3 placement as DT (12/1/23) w/ a hospital stay complicated by vasoplegia(requiring Giaprezza), debility, malnutrition/impaired swallowing, and renal failure requiring HD (since weaned off) w/ recent discharge for ADHF presented to the ED with wife due to c/o worsening generalized weakness since 6/10. Per wife patient was mostly sleeping during the day despite sleeping 10 hrs at night. Patient also admits decreased appetite and decreased urine output despite taking his medications as prescribed. He has not urinated since 6/11 at 2pm. He has chronic shortness of breath on exertion. Denies confusion, syncope, diarrhea, constipation, N/V, dysuria, or other complaints. Patient had his prior tunnel cath removed used for HD about 2 weeks ago. He completed a 6 week therapy for Enterococcus bacteremia with ampicillin and rocephin ending 5/30/24a and was switched to PO  antibiotics.     On prior hospital stay nephrology consulted during hospital stay due to elevated renal function. They feel he is close to being a dialysis patient. He diuresed on IV Bumex with prn Metolazone. He is net negative 1.2L throughout his hospital stay and weight on day of discharge was 179lbs down from 186lbs on admit. We continued his home dose of Bumex 4mg BID but increased his prn Metolazone to 10mg. He received a dose of Epo while inpatient and he has plans to follow up with Heme/Onc to get outpatient therapy arranged.      2D Echo with CFD done on 3/26/2024  LVAD: There is a Heartmate III LVAD running at 5000 RPM. The aortic valve does not open. The ventricular septum is at midline. Inflow cannula well seated at the apex. Outflow graft not visualized.   Left Ventricle: The left ventricle is moderately dilated. Normal wall thickness. Global hypokinesis present. Septal motion is abnormal. There is severely reduced systolic function with a visually estimated ejection fraction of 5 - 10%. There is diastolic dysfunction but grade cannot be determined.   Right Ventricle: Mild right ventricular enlargement. Wall thickness is normal. Right ventricle wall motion is normal. Systolic function is normal. Pacemaker lead present in the ventricle.   Left Atrium: Left atrium is severely dilated.   Right Atrium: Right atrium is moderately dilated.   Mitral Valve: The mitral valve is repaired with an Noble stitch. There is mild regurgitation.   IVC/SVC: Normal venous pressure at 3 mmHg.              * Acute renal failure superimposed on stage 4 chronic kidney disease  Mr. 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 Admitted for worsening renal function with a BUN>100/Cr. 9.9.     - CT Ab/pel: Kidneys/ Ureters: Normal in size and location.  Mild bilateral perinephric  stranding, a nonspecific finding and similar to prior.  No hydronephrosis or nephrolithiasis. No ureteral dilatation.  - Nephrology consulted, appreciate recs.   -Continues on CRRt     - Will need OP HD upon discharge, recommend CM/SW involvement to start workup.  - Will need IR evaluation once out of ICU for TDC placement.  - Renally dosing medications      LVAD (left ventricular assist device) present  -HeartMate 3 Implanted  12/1/2023  as DT  -Cont Coumadin, dosing by PharmD.  Goal INR 2.0-3.0 . Subtherapeutic today.   -Antiplatelets Not on ASA  -LDH is stable overall today. Will continue to monitor daily.  -Speed set at  5000     -Interrogation notable for no events  -Not listed for OHTx, declined for OHTX due to comorbidities     Procedure: Device Interrogation Including analysis of device parameters  Current Settings: Ventricular Assist Device  Review of device function is stable        6/17/2024     8:01 AM 6/17/2024     7:01 AM 6/17/2024     6:01 AM 6/17/2024     5:01 AM 6/17/2024     4:01 AM 6/17/2024     3:01 AM 6/17/2024     2:01 AM   TXP LVAD INTERROGATIONS   Type HeartMate3 HeartMate3 HeartMate3 HeartMate3 HeartMate3 HeartMate3 HeartMate3   Flow 4.6 4.6 4.7 4.7 4.8 4.6 4.6   Speed 5050 5000 5000 5000 5000 5000 5000   PI 3.5 3.5 2.2 2.2 2.2 2.6 3   Power (Carrington) 3.4 3.4 3.4 3.3 3.4 3.4 3.4   LSL 4600 4600 4600 4600 4600 4600 4600   Pulsatility Intermittent pulse Intermittent pulse Intermittent pulse Intermittent pulse Intermittent pulse Intermittent pulse Intermittent pulse         Aspiration pneumonia of both lungs  -CT chest and CXR concerning for pneumonia   -Sputum cx ordered  -Broadened to Zosyn     Delirium    -CT head-toe neg.   -Would prefer oral agent tonight if he can tolerate PO vs PM precedex?    Hypoglycemia  -Endocrine following. D10W gtt   -Starting tube feeds     Bacteremia due to Enterococcus  He completed a 6 week therapy for Enterococcus bacteremia with ampicillin and rocephin ending  5/30/24a and was switched to Amoxicillin for suppression    Anticoagulant long-term use  Cont warfarin    Anemia  Chronic with no acute bleeding, multifactorial 2/2 CKD4 and chronic anticoagulation  -Iron panel done. Iron deficient but Ferritin is elevated at 1186  -Homocysteine and methylmalonic pending   -Was set up for OP Epo shots   -Continue to trend CBC's    Adjustment disorder with depressed mood  Cont SSRI    PAF (paroxysmal atrial fibrillation)  -Continue amiodarone and Coumadin      Type 2 diabetes mellitus without complication, without long-term current use of insulin  -Endocrine    Acute on chronic combined systolic and diastolic heart failure  Owing to his advanced kidney disease and significantly elevated creat, he is not on a  ACEI/ARB/ARNI or MRA. He is also not on a beta blocker due to RV dysfunction.     Plan:   - See LVAD  - See IVÁN    CAD (coronary artery disease)  -Continue statin      Uninterrupted Critical Care/Counseling Time (not including procedures): 60 mins     Hugh Duran NP  Heart Transplant  [facility] - Cardiac Intensive [facility]

## 2024-06-17 NOTE — ASSESSMENT & PLAN NOTE
Chronic with no acute bleeding, multifactorial 2/2 CKD4 and chronic anticoagulation  -Iron panel done. Iron deficient but Ferritin is elevated at 1186  -Homocysteine and methylmalonic pending   -Was set up for OP Epo shots   -Continue to trend CBC's

## 2024-06-17 NOTE — SUBJECTIVE & OBJECTIVE
Interval History: Tmax yesterday 100. Chest x ray concern for PNA. Hgb 6.5. HD trial today. CVP 4 this am.     Review of patient's allergies indicates:  No Known Allergies  Current Facility-Administered Medications   Medication Frequency    0.9%  NaCl infusion (for blood administration) Q24H PRN    0.9%  NaCl infusion Continuous    0.9%  NaCl infusion PRN    0.9%  NaCl infusion Once    acetaminophen tablet 650 mg Q6H PRN    amiodarone tablet 400 mg Daily    amLODIPine tablet 5 mg Daily    atorvastatin tablet 40 mg QHS    dexmedetomidine (PRECEDEX) 400mcg/100mL 0.9% NaCL infusion Continuous    dextrose 10 % infusion Continuous    dextrose 10% bolus 125 mL 125 mL PRN    dextrose 10% bolus 250 mL 250 mL PRN    folic acid tablet 1 mg Daily    glucagon (human recombinant) injection 1 mg PRN    glucose chewable tablet 16 g PRN    glucose chewable tablet 24 g PRN    hydrALAZINE tablet 100 mg Q8H    insulin aspart U-100 pen 0-5 Units QID (AC + HS) PRN    LIDOcaine 5 % patch 2 patch Q24H    pantoprazole EC tablet 40 mg Daily    piperacillin-tazobactam (ZOSYN) 4.5 g in dextrose 5 % in water (D5W) 100 mL IVPB (MB+) Q8H    QUEtiapine tablet 25 mg QHS    simethicone chewable tablet 80 mg TID PRN    trazodone split tablet 25 mg Nightly PRN    venlafaxine tablet 37.5 mg Daily    vitamin D 1000 units tablet 1,000 Units Daily    warfarin (COUMADIN) tablet 2.5 mg Daily       Objective:     Vital Signs (Most Recent):  Temp: 99.4 °F (37.4 °C) (06/17/24 1101)  Pulse: 72 (06/17/24 1201)  Resp: (!) 21 (06/17/24 1201)  BP: (!) 73/57 (06/17/24 1201)  SpO2: 100 % (06/17/24 1201) Vital Signs (24h Range):  Temp:  [98 °F (36.7 °C)-99.4 °F (37.4 °C)] 99.4 °F (37.4 °C)  Pulse:  [] 72  Resp:  [18-39] 21  SpO2:  [96 %-100 %] 100 %  BP: ()/(0-89) 73/57     Weight: 78.6 kg (173 lb 4.5 oz) (06/17/24 0501)  Body mass index is 22.86 kg/m².  Body surface area is 2.01 meters squared.    I/O last 3 completed shifts:  In: 4315.9 [I.V.:3758; IV  Piggyback:557.9]  Out: 5841 [Emesis/NG output:1; Other:5840]     Physical Exam  Vitals and nursing note reviewed.   Constitutional:       Appearance: Normal appearance.   HENT:      Head: Normocephalic and atraumatic.   Eyes:      Conjunctiva/sclera: Conjunctivae normal.      Pupils: Pupils are equal, round, and reactive to light.   Neck:      Comments: RIJ trialysis cath.   Cardiovascular:      Rate and Rhythm: Normal rate and regular rhythm.      Comments: Smooth VAD hum  Pulmonary:      Effort: Pulmonary effort is normal.      Breath sounds: Normal breath sounds.   Abdominal:      General: Bowel sounds are normal.      Palpations: Abdomen is soft.   Musculoskeletal:         General: No swelling. Normal range of motion.      Cervical back: Normal range of motion and neck supple.   Skin:     General: Skin is warm and dry.      Capillary Refill: Capillary refill takes 2 to 3 seconds.   Neurological:      General: No focal deficit present.      Mental Status: He is alert and oriented to person, place, and time.          Significant Labs:  CBC:   Recent Labs   Lab 06/17/24 0319   WBC 12.21   RBC 2.37*   HGB 6.5*   HCT 20.9*      MCV 88   MCH 27.4   MCHC 31.1*     CMP:   Recent Labs   Lab 06/17/24 0319   GLU 96   CALCIUM 8.7   ALBUMIN 2.3*   PROT 7.0   *   K 3.7   CO2 22*   CL 96   BUN 22   CREATININE 2.4*   ALKPHOS 287*   *   *   BILITOT 0.7     All labs within the past 24 hours have been reviewed.

## 2024-06-17 NOTE — PT/OT/SLP PROGRESS
Physical Therapy      Patient Name:  Radha Abbott   MRN:  [id number]    Patient not seen today secondary to Patient fatigue. Attempted to see pt late this AM w/ pt unable to be awoken by therapists w/ wife stating he'd been sleeping almost constantly for the last 1-2 days and RN stating when he does awake he cannot follow any commands/participate. Will follow-up as appropriate.  Shannon Wilcox, PT

## 2024-06-17 NOTE — PLAN OF CARE
POC reviewed with Radha Abbott and family. Questions and concerns addressed. CVP: 7, 6 and 8.  Svo2: 46%. CRRT stopped.See below and flowsheets for full assessment and VS info.       Neuro:  Michelle Coma Scale  Best Eye Response: 3-->(E3) to speech  Best Motor Response: 5-->(M5) localizes pain  Best Verbal Response: 3-->(V3) inappropriate words  Michelle Coma Scale Score: 11  Assessment Qualifiers: patient not sedated/intubated  Pupil PERRLA: yes    24 hr Temp:  [97.5 °F (36.4 °C)-100 °F (37.8 °C)]     CV:  Rhythm: normal sinus rhythm   DVT prophylaxis: VTE Required Core Measure: Pharmacological prophylaxis initiated/maintained    CVP (mean): 8 mmHg (06/16/24 1501)       SVO2 (%): (S) 53 % (06/15/24 2000)       Type: HeartMate3       Pulses  Right Radial Pulse: 1+ (weak)  Left Radial Pulse: 1+ (weak)  Right Dorsalis Pedis Pulse: Doppler  Left Dorsalis Pedis Pulse: Doppler  Right Posterior Tibial Pulse: Doppler  Left Posterior Tibial Pulse: Doppler    Resp:  Flow (L/min) (Oxygen Therapy): 3  Oxygen Concentration (%): 32    GI/:  GI prophylaxis: no  Diet/Nutrition Received: no added salt, restrict fluids  Last Bowel Movement: 06/16/24  Voiding Characteristics: anuria, patient on CRRT   Intake/Output Summary (Last 24 hours) at 6/16/2024 1908  Last data filed at 6/16/2024 1701  Gross per 24 hour   Intake 3460.49 ml   Output 5840 ml   Net -2379.51 ml     Treatment Type: Slow low efficiency dialysis  UF Rate: 500 cc/hr  Labs/Accuchecks:  Recent Labs   Lab 06/14/24  0221 06/14/24  1835 06/15/24  0234 06/15/24  0810 06/15/24  1247 06/16/24  0352   WBC 9.81  --  11.96  --   --  13.49*   RBC 2.22*  --  2.21*  --   --  2.38*   HGB 6.2*  --  6.3*  --   --  6.6*   HCT 20.3*   < > 19.5* 21* 22* 20.7*     --  364  --   --  377    < > = values in this interval not displayed.      Recent Labs   Lab 06/14/24  0221 06/15/24  0234 06/16/24  0352   INR 3.2* 3.4* 2.6*   APTT 53.5* 50.4* 43.9*      Recent Labs     06/15/24  0234  06/15/24  1401 06/16/24  1734   *  129*  129*  129*   < > 130*   K 4.6  4.6  4.6  4.6   < > 3.5   CO2 18*  18*  18*  18*   < > 22*   CL 99  99  99  99   < > 95   BUN 11  11  11  11   < > 13   CREATININE 1.3  1.3  1.3  1.3   < > 1.6*   ALKPHOS 253*  --   --    *  --   --    *  --   --    BILITOT 0.7  --   --     < > = values in this interval not displayed.       Recent Labs   Lab 06/11/24  1933   TROPONINI 0.051*      Recent Labs     06/15/24  1258 06/15/24  1912 06/15/24  1917 06/16/24  0801 06/16/24  1612   PH 7.509* 7.494* 7.426  --   --    PCO2 28.2* 30.7* 38.0  --   --    PO2 91 79* 27* 24* 24*   HCO3 22.4* 23.6* 25.0  --   --    POCSATURATED 98 97 53 47 46   BE -1 0 1  --   --        Electrolytes: Electrolytes replaced  Accuchecks: ACHS    Gtts/LDAs:   sodium chloride 0.9%   Intravenous Continuous   Stopped at 06/13/24 0117    dexmedeTOMIDine (Precedex) infusion (titrating)  0-1.4 mcg/kg/hr Intravenous Continuous        dextrose 10 % in water (D10W)   Intravenous Continuous 25 mL/hr at 06/16/24 1701 Rate Verify at 06/16/24 1701       Lines/Drains/Airways       Central Venous Catheter Line  Duration             Trialysis (Dialysis) Catheter 06/12/24 0124 right internal jugular 4 days              Line  Duration                  VAD 12/01/23 1015 Left ventricular assist device HeartMate 3 198 days              Peripheral Intravenous Line  Duration                  Midline Catheter - Single Lumen 06/13/24 1623 Left basilic vein (medial side of arm) 20g x 10cm 3 days                    Skin/Wounds  Bathing/Skin Care: bath, complete;back care;linen changed;incontinence care (06/15/24 1901)  Wounds: No  Wound care consulted: No

## 2024-06-17 NOTE — ASSESSMENT & PLAN NOTE
BG goal 140-180  T2DM.  LVAD. Admitted for worsening renal function with a BUN>100/Cr. 9.9. Hypoglycemia likely secondary to RIVAS (glimepiride) with worsened kidney function. Last took on 6/11. D10 drip stopped and blood sugar downtrended w/ lows requiring D10. D10 restarted at 25 mL/hr w/ stable bg    Plan:  -Recommend D10 infusion at 25mL/hr, but will defer to primary for fluid management.  -Novolog /50  -BG monitoring ac/hs  -Hypoglycemia protocol in place    ** Please call Endocrine for any BG related issues **    Discharge plans:   TBD  -D/C Amaryl

## 2024-06-17 NOTE — PROGRESS NOTES
06/17/2024  [place]    Current provider:  Sajan Hurley, *    Device interrogation:      6/17/2024     6:01 AM 6/17/2024     5:01 AM 6/17/2024     4:01 AM 6/17/2024     3:01 AM 6/17/2024     2:01 AM 6/17/2024     1:01 AM 6/17/2024    12:01 AM   TXP LVAD INTERROGATIONS   Type HeartMate3 HeartMate3 HeartMate3 HeartMate3 HeartMate3 HeartMate3 HeartMate3   Flow 4.7 4.7 4.8 4.6 4.6 4.6 4.6   Speed 5000 5000 5000 5000 5000 5000 5000   PI 2.2 2.2 2.2 2.6 3 3.6 3.3   Power ([place]) 3.4 3.3 3.4 3.4 3.4 3.5 3.4   LSL 4600 4600 4600 4600 4600 4600 4600   Pulsatility Intermittent pulse Intermittent pulse Intermittent pulse Intermittent pulse Intermittent pulse Intermittent pulse Intermittent pulse          Rounded on [facility] to ensure all mechanical assist device settings (IABP or VAD) were appropriate and all parameters were within limits.  I was able to ensure all back up equipment was present, the staff had no issues, and the Perfusion Department daily rounding was complete.      For implantable VADs: Interrogation of Ventricular assist device was performed with analysis of device parameters and review of device function. I have personally reviewed the interrogation findings and agree with findings as stated.     In emergency, the nursing units have been notified to contact the perfusion department either by:  Calling [phone] from 630am to 4pm Mon thru Fri, utilizing the On-Call Finder functionality of Epic and searching for Perfusion, or by contacting the hospital  from 4pm to 630am and on weekends and asking to speak with the perfusionist on call.    6:52 AM

## 2024-06-17 NOTE — SUBJECTIVE & OBJECTIVE
Interval History: Rested on Precedex overnight. Still delirious this AM. Incoherent and doesn't follow commands. WBC 12 today. Tmax yesterday noted to be 100. Imaging with concern for pna. Starting broad spectrum Zosyn. Obtain sputum cx if able. Placing feeding tube for nutrition and meds.     Continuous Infusions:   sodium chloride 0.9%   Intravenous Continuous   Stopped at 06/13/24 0117    dexmedeTOMIDine (Precedex) infusion (titrating)  0-1.4 mcg/kg/hr Intravenous Continuous   Stopped at 06/17/24 0655    dextrose 10 % in water (D10W)   Intravenous Continuous 25 mL/hr at 06/17/24 0701 Rate Verify at 06/17/24 0701     Scheduled Meds:   amiodarone  400 mg Oral Daily    amLODIPine  5 mg Oral Daily    atorvastatin  40 mg Oral QHS    folic acid  1 mg Oral Daily    hydrALAZINE  100 mg Oral Q8H    LIDOcaine  2 patch Transdermal Q24H    pantoprazole  40 mg Oral Daily    piperacillin-tazobactam (Zosyn) IV (PEDS and ADULTS) (extended infusion is not appropriate)  4.5 g Intravenous Q8H    QUEtiapine  25 mg Oral QHS    venlafaxine  37.5 mg Oral Daily    vitamin D  1,000 Units Oral Daily    warfarin  2.5 mg Oral Daily     PRN Meds:  Current Facility-Administered Medications:     sodium chloride 0.9%, , Intravenous, PRN    acetaminophen, 650 mg, Oral, Q6H PRN    dextrose 10%, 12.5 g, Intravenous, PRN    dextrose 10%, 25 g, Intravenous, PRN    glucagon (human recombinant), 1 mg, Intramuscular, PRN    glucose, 16 g, Oral, PRN    glucose, 24 g, Oral, PRN    insulin aspart U-100, 0-5 Units, Subcutaneous, QID (AC + HS) PRN    simethicone, 1 tablet, Oral, TID PRN    traZODone, 25 mg, Oral, Nightly PRN    Review of patient's allergies indicates:  No Known Allergies  Objective:     Vital Signs (Most Recent):  Temp: 98 °F (36.7 °C) (06/17/24 0701)  Pulse: 87 (06/17/24 0801)  Resp: (!) 38 (06/17/24 0801)  BP: (!) 78/57 (06/17/24 0801)  SpO2: 97 % (06/17/24 0801) Vital Signs (24h Range):  Temp:  [98 °F (36.7 °C)-98.8 °F (37.1 °C)] 98 °F  (36.7 °C)  Pulse:  [] 87  Resp:  [18-39] 38  SpO2:  [96 %-100 %] 97 %  BP: ()/(0-89) 78/57     Patient Vitals for the past 72 hrs (Last 3 readings):   Weight   06/17/24 0501 78.6 kg (173 lb 4.5 oz)   06/16/24 0501 79.6 kg (175 lb 7.8 oz)   06/15/24 1127 79.8 kg (176 lb)     Body mass index is 22.86 kg/m².      Intake/Output Summary (Last 24 hours) at 6/17/2024 1144  Last data filed at 6/17/2024 0745  Gross per 24 hour   Intake 1265.06 ml   Output 0 ml   Net 1265.06 ml         Telemetry: SR       Physical Exam  Constitutional:       Appearance: Normal appearance.   HENT:      Head: Normocephalic and atraumatic.   Eyes:      Conjunctiva/sclera: Conjunctivae normal.      Pupils: Pupils are equal, round, and reactive to light.   Neck:      Comments: RIJ trialysis cath.   Cardiovascular:      Rate and Rhythm: Normal rate and regular rhythm.      Comments: Smooth VAD hum  Pulmonary:      Effort: Pulmonary effort is normal.      Breath sounds: Normal breath sounds.   Abdominal:      General: Bowel sounds are normal.      Palpations: Abdomen is soft.   Musculoskeletal:         General: No swelling. Normal range of motion.      Cervical back: Normal range of motion and neck supple.   Skin:     General: Skin is warm and dry.      Capillary Refill: Capillary refill takes 2 to 3 seconds.   Neurological:      General: No focal deficit present.      Mental Status: He is alert and oriented to person, place, and time.            Significant Labs:  CBC:  Recent Labs   Lab 06/15/24  0234 06/15/24  0810 06/15/24  1247 06/16/24  0352 06/17/24  0319   WBC 11.96  --   --  13.49* 12.21   RBC 2.21*  --   --  2.38* 2.37*   HGB 6.3*  --   --  6.6* 6.5*   HCT 19.5*   < > 22* 20.7* 20.9*     --   --  377 379   MCV 88  --   --  87 88   MCH 28.5  --   --  27.7 27.4   MCHC 32.3  --   --  31.9* 31.1*    < > = values in this interval not displayed.     BNP:  Recent Labs   Lab 06/11/24  1933 06/14/24 0221 06/17/24 0319   BNP  1,388* 1,542* 1,023*     CMP:  Recent Labs   Lab 06/14/24  0221 06/14/24  1313 06/15/24  0234 06/15/24  1401 06/15/24  2245 06/16/24 0352 06/16/24  1734 06/17/24 0319   *  129*  129*   < > 77  77  77  77   < > 69* 54*  54* 77 96   CALCIUM 9.0  9.0  9.0   < > 8.9  8.9  8.9  8.9   < > 8.9 8.7  8.7 8.6* 8.7   ALBUMIN 2.5*  2.5*  2.5*   < > 2.5*  2.5*  2.5*  2.5*   < > 2.5* 2.5*  --  2.3*   PROT 7.6  --  7.8  --   --   --   --  7.0   *  129*  129*   < > 129*  129*  129*  129*   < > 129* 130*  130* 130* 131*   K 4.7  4.7  4.7   < > 4.6  4.6  4.6  4.6   < > 4.3 4.0  4.0 3.5 3.7   CO2 17*  17*  17*   < > 18*  18*  18*  18*   < > 22* 22*  22* 22* 22*     100  100   < > 99  99  99  99   < > 96 96  96 95 96   BUN 30*  30*  30*   < > 11  11  11  11   < > 7* 6*  6* 13 22   CREATININE 3.3*  3.3*  3.3*   < > 1.3  1.3  1.3  1.3   < > 0.8 0.8  0.8 1.6* 2.4*   ALKPHOS 165*  --  253*  --   --   --   --  287*   ALT 65*  --  148*  --   --   --   --  285*   AST 64*  --  207*  --   --   --   --  325*   BILITOT 0.4  --  0.7  --   --   --   --  0.7    < > = values in this interval not displayed.      Coagulation:   Recent Labs   Lab 06/15/24  0234 06/16/24  0352 06/17/24  0319   INR 3.4* 2.6* 1.8*   APTT 50.4* 43.9* 40.2*     LDH:  Recent Labs   Lab 06/15/24  0234 06/16/24  0352 06/17/24  0319   * 939* 468*     Microbiology:  Microbiology Results (last 7 days)       Procedure Component Value Units Date/Time    Culture, Respiratory with Gram Stain [[id number]]     Order Status: No result Specimen: Respiratory     Blood culture #1 **CANNOT BE ORDERED STAT** [[id number]] Collected: 06/11/24 2122    Order Status: Completed Specimen: Blood from Peripheral, Antecubital, Right Updated: 06/16/24 2212     Blood Culture, Routine No growth after 5 days.    Blood culture #2 **CANNOT BE ORDERED STAT** [[id number]] Collected: 06/11/24 2121    Order Status: Completed  Specimen: Blood from Peripheral, Hand, Left Updated: 06/16/24 2212     Blood Culture, Routine No growth after 5 days.    Urine culture [[id number]] Collected: 06/12/24 1422    Order Status: Completed Specimen: Urine Updated: 06/13/24 2113     Urine Culture, Routine No growth    Narrative:      Specimen Source->Urine            I have reviewed all pertinent labs within the past 24 hours.    Estimated Creatinine Clearance: 35.5 mL/min (A) (based on SCr of 2.4 mg/dL (H)).    Diagnostic Results:  I have reviewed and interpreted all pertinent imaging results/findings within the past 24 hours.

## 2024-06-17 NOTE — PROGRESS NOTES
[facility] - Cardiac Intensive Care  Nephrology  Progress Note    Patient Name: Radha Abbott  MRN: [id number]  Admission Date: 6/11/2024  Hospital Length of Stay: 6 days  Attending Provider: Sajan Hurley, *   Primary Care Physician: Vasu Kong MD  Principal Problem:Acute renal failure superimposed on stage 4 chronic kidney disease    Subjective:     Interval History: Tmax yesterday 100. Chest x ray concern for PNA. Hgb 6.5. HD trial today. CVP 4 this am.     Review of patient's allergies indicates:  No Known Allergies  Current Facility-Administered Medications   Medication Frequency    0.9%  NaCl infusion (for blood administration) Q24H PRN    0.9%  NaCl infusion Continuous    0.9%  NaCl infusion PRN    0.9%  NaCl infusion Once    acetaminophen tablet 650 mg Q6H PRN    amiodarone tablet 400 mg Daily    amLODIPine tablet 5 mg Daily    atorvastatin tablet 40 mg QHS    dexmedetomidine (PRECEDEX) 400mcg/100mL 0.9% NaCL infusion Continuous    dextrose 10 % infusion Continuous    dextrose 10% bolus 125 mL 125 mL PRN    dextrose 10% bolus 250 mL 250 mL PRN    folic acid tablet 1 mg Daily    glucagon (human recombinant) injection 1 mg PRN    glucose chewable tablet 16 g PRN    glucose chewable tablet 24 g PRN    hydrALAZINE tablet 100 mg Q8H    insulin aspart U-100 pen 0-5 Units QID (AC + HS) PRN    LIDOcaine 5 % patch 2 patch Q24H    pantoprazole EC tablet 40 mg Daily    piperacillin-tazobactam (ZOSYN) 4.5 g in dextrose 5 % in water (D5W) 100 mL IVPB (MB+) Q8H    QUEtiapine tablet 25 mg QHS    simethicone chewable tablet 80 mg TID PRN    trazodone split tablet 25 mg Nightly PRN    venlafaxine tablet 37.5 mg Daily    vitamin D 1000 units tablet 1,000 Units Daily    warfarin (COUMADIN) tablet 2.5 mg Daily       Objective:     Vital Signs (Most Recent):  Temp: 99.4 °F (37.4 °C) (06/17/24 1101)  Pulse: 72 (06/17/24 1201)  Resp: (!) 21 (06/17/24 1201)  BP: (!) 73/57 (06/17/24 1201)  SpO2: 100 % (06/17/24 1201)  Vital Signs (24h Range):  Temp:  [98 °F (36.7 °C)-99.4 °F (37.4 °C)] 99.4 °F (37.4 °C)  Pulse:  [] 72  Resp:  [18-39] 21  SpO2:  [96 %-100 %] 100 %  BP: ()/(0-89) 73/57     Weight: 78.6 kg (173 lb 4.5 oz) (06/17/24 0501)  Body mass index is 22.86 kg/m².  Body surface area is 2.01 meters squared.    I/O last 3 completed shifts:  In: 4315.9 [I.V.:3758; IV Piggyback:557.9]  Out: 5841 [Emesis/NG output:1; Other:5840]     Physical Exam  Vitals and nursing note reviewed.   Constitutional:       Appearance: Normal appearance.   HENT:      Head: Normocephalic and atraumatic.   Eyes:      Conjunctiva/sclera: Conjunctivae normal.      Pupils: Pupils are equal, round, and reactive to light.   Neck:      Comments: RIJ trialysis cath.   Cardiovascular:      Rate and Rhythm: Normal rate and regular rhythm.      Comments: Smooth VAD hum  Pulmonary:      Effort: Pulmonary effort is normal.      Breath sounds: Normal breath sounds.   Abdominal:      General: Bowel sounds are normal.      Palpations: Abdomen is soft.   Musculoskeletal:         General: No swelling. Normal range of motion.      Cervical back: Normal range of motion and neck supple.   Skin:     General: Skin is warm and dry.      Capillary Refill: Capillary refill takes 2 to 3 seconds.   Neurological:      General: No focal deficit present.      Mental Status: He is alert and oriented to person, place, and time.          Significant Labs:  CBC:   Recent Labs   Lab 06/17/24 0319   WBC 12.21   RBC 2.37*   HGB 6.5*   HCT 20.9*      MCV 88   MCH 27.4   MCHC 31.1*     CMP:   Recent Labs   Lab 06/17/24 0319   GLU 96   CALCIUM 8.7   ALBUMIN 2.3*   PROT 7.0   *   K 3.7   CO2 22*   CL 96   BUN 22   CREATININE 2.4*   ALKPHOS 287*   *   *   BILITOT 0.7     All labs within the past 24 hours have been reviewed.       Assessment/Plan:     Renal/  * Acute renal failure superimposed on stage 4 chronic kidney disease  Patient with a baseline  eGFR that appears to be variable however was noted to be in the 15-20 range in February before his recent IVÁN in May, presents now with fatigue and worsening IVÁN with a creatinine of 9.8 and eGFR of 5.5. BUN was noted to be elevated at 117, other electrolytes were normal. He was noted to have some asterixis on exam (which he states is chronic for him and unchanged from his baseline), however denies any other signs or symptoms of uremia. Bedside bladder scan revealed 200 mL of urine in his bladder and his last bladder void was 10 hours prior to this consult.     SLED initiated 6/13 x4 hours.  Mental status improved.  6/14:  8 hour SLED overnight, net even.    Recommendations:     -HD trial today for today for metabolic clearance, plan to transfuse 1 unit PRBCs with HD   -Will start epo with next HD. PTH, phos with next labs   -Avoid nephrotoxic agents (IV contrast, NSAID's, gadolinium contrast, PPI's) if able.   -Strict I's and O's  -RFP q 12 hours while on RRT.  PRN mg/phos replacement  -Renally dose all medications  -will need OP HD upon discharge, recommend CM/SW involvement to start workup.  -will need IR evaluation once out of ICU for TDC placement.        Thank you for your consult. I will follow-up with patient. Please contact us if you have any additional questions.    Yolanda Cai, RONI  Nephrology  [facility] - Cardiac Intensive Care   Island Pedicle Flap-Requiring Vessel Identification Text: The defect edges were debeveled with a #15 scalpel blade.  Given the location of the defect, shape of the defect and the proximity to free margins an island pedicle advancement flap was deemed most appropriate.  Using a sterile surgical marker, an appropriate advancement flap was drawn, based on the axial vessel mentioned above, incorporating the defect, outlining the appropriate donor tissue and placing the expected incisions within the relaxed skin tension lines where possible.    The area thus outlined was incised deep to adipose tissue with a #15 scalpel blade.  The skin margins were undermined to an appropriate distance in all directions around the primary defect and laterally outward around the island pedicle utilizing iris scissors.  There was minimal undermining beneath the pedicle flap.

## 2024-06-17 NOTE — PT/OT/SLP PROGRESS
Occupational Therapy      Patient Name:  Radha Abbott   MRN:  [id number]    Patient not seen today secondary to  (attempted at 1058, pt somnolent & unarousable to verbal stimuli as well as sternal rub & tactile stimuli to arms or feet.  Will hold for today due to this (spouse reported that he has been somnolent since all day yesterday & told).). Will follow-up as appropriate.    6/17/2024

## 2024-06-17 NOTE — ASSESSMENT & PLAN NOTE
-HeartMate 3 Implanted  12/1/2023  as DT  -Cont Coumadin, dosing by PharmD.  Goal INR 2.0-3.0 . Subtherapeutic today.   -Antiplatelets Not on ASA  -LDH is stable overall today. Will continue to monitor daily.  -Speed set at  5000     -Interrogation notable for no events  -Not listed for OHTx, declined for OHTX due to comorbidities     Procedure: Device Interrogation Including analysis of device parameters  Current Settings: Ventricular Assist Device  Review of device function is stable        6/17/2024     8:01 AM 6/17/2024     7:01 AM 6/17/2024     6:01 AM 6/17/2024     5:01 AM 6/17/2024     4:01 AM 6/17/2024     3:01 AM 6/17/2024     2:01 AM   TXP LVAD INTERROGATIONS   Type HeartMate3 HeartMate3 HeartMate3 HeartMate3 HeartMate3 HeartMate3 HeartMate3   Flow 4.6 4.6 4.7 4.7 4.8 4.6 4.6   Speed 5050 5000 5000 5000 5000 5000 5000   PI 3.5 3.5 2.2 2.2 2.2 2.6 3   Power ([place]) 3.4 3.4 3.4 3.3 3.4 3.4 3.4   LSL 4600 4600 4600 4600 4600 4600 4600   Pulsatility Intermittent pulse Intermittent pulse Intermittent pulse Intermittent pulse Intermittent pulse Intermittent pulse Intermittent pulse

## 2024-06-17 NOTE — ASSESSMENT & PLAN NOTE
Mr. 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 Admitted for worsening renal function with a BUN>100/Cr. 9.9.     - CT Ab/pel: Kidneys/ Ureters: Normal in size and location.  Mild bilateral perinephric stranding, a nonspecific finding and similar to prior.  No hydronephrosis or nephrolithiasis. No ureteral dilatation.  - Nephrology consulted, appreciate recs.   -Continues on CRRt     - Will need OP HD upon discharge, recommend CM/SW involvement to start workup.  - Will need IR evaluation once out of ICU for TDC placement.  - Renally dosing medications

## 2024-06-17 NOTE — CARE UPDATE
Hemodynamics     8PM  CVP 4  SVO2 63   CO 9.2  CI 4.3        Continuous Infusions:   sodium chloride 0.9%   Intravenous Continuous   Stopped at 06/13/24 0117    dexmedeTOMIDine (Precedex) infusion (titrating)  0-1.4 mcg/kg/hr Intravenous Continuous 3.98 mL/hr at 06/16/24 2123 0.2 mcg/kg/hr at 06/16/24 2123    dextrose 10 % in water (D10W)   Intravenous Continuous 25 mL/hr at 06/16/24 1901 Rate Verify at 06/16/24 1901       Intake/Output Summary (Last 24 hours) at 6/17/2024 0145  Last data filed at 6/16/2024 1901  Gross per 24 hour   Intake 2480.41 ml   Output 4497 ml   Net -2016.59 ml         Recommendation:   -- continue monitoring urine output and hemodynamics closely.   -- continue current care    Case discussed with on call attending.    Ravi Mcdonald MD  Cardiology Fellow, PGY4

## 2024-06-17 NOTE — ASSESSMENT & PLAN NOTE
Patient with a baseline eGFR that appears to be variable however was noted to be in the 15-20 range in February before his recent IVÁN in May, presents now with fatigue and worsening IVÁN with a creatinine of 9.8 and eGFR of 5.5. BUN was noted to be elevated at 117, other electrolytes were normal. He was noted to have some asterixis on exam (which he states is chronic for him and unchanged from his baseline), however denies any other signs or symptoms of uremia. Bedside bladder scan revealed 200 mL of urine in his bladder and his last bladder void was 10 hours prior to this consult.     SLED initiated 6/13 x4 hours.  Mental status improved.  6/14:  8 hour SLED overnight, net even.    Recommendations:     -HD trial today for today for metabolic clearance, plan to transfuse 1 unit PRBCs with HD   -Avoid nephrotoxic agents (IV contrast, NSAID's, gadolinium contrast, PPI's) if able.   -Strict I's and O's  -RFP q 12 hours while on RRT.  PRN mg/phos replacement  -Renally dose all medications  -will need OP HD upon discharge, recommend CM/SW involvement to start workup.  -will need IR evaluation once out of ICU for TDC placement.

## 2024-06-17 NOTE — ASSESSMENT & PLAN NOTE
Owing to his advanced kidney disease and significantly elevated creat, he is not on a  ACEI/ARB/ARNI or MRA. He is also not on a beta blocker due to RV dysfunction.     Plan:   - See LVAD  - See IVÁN

## 2024-06-18 LAB
ALBUMIN SERPL BCP-MCNC: 2.4 G/DL (ref 3.5–5.2)
ALLENS TEST: ABNORMAL
ALLENS TEST: ABNORMAL
ALP SERPL-CCNC: 381 U/L (ref 55–135)
ALT SERPL W/O P-5'-P-CCNC: 196 U/L (ref 10–44)
ANION GAP SERPL CALC-SCNC: 11 MMOL/L (ref 8–16)
ANION GAP SERPL CALC-SCNC: 11 MMOL/L (ref 8–16)
APTT PPP: 40.3 SEC (ref 21–32)
AST SERPL-CCNC: 136 U/L (ref 10–40)
BASOPHILS # BLD AUTO: 0.01 K/UL (ref 0–0.2)
BASOPHILS NFR BLD: 0.1 % (ref 0–1.9)
BILIRUB SERPL-MCNC: 0.8 MG/DL (ref 0.1–1)
BUN SERPL-MCNC: 17 MG/DL (ref 8–23)
BUN SERPL-MCNC: 25 MG/DL (ref 8–23)
CALCIUM SERPL-MCNC: 8.3 MG/DL (ref 8.7–10.5)
CALCIUM SERPL-MCNC: 8.8 MG/DL (ref 8.7–10.5)
CHLORIDE SERPL-SCNC: 102 MMOL/L (ref 95–110)
CHLORIDE SERPL-SCNC: 97 MMOL/L (ref 95–110)
CO2 SERPL-SCNC: 18 MMOL/L (ref 23–29)
CO2 SERPL-SCNC: 20 MMOL/L (ref 23–29)
CREAT SERPL-MCNC: 1.8 MG/DL (ref 0.5–1.4)
CREAT SERPL-MCNC: 2.5 MG/DL (ref 0.5–1.4)
DELSYS: ABNORMAL
DELSYS: ABNORMAL
DIFFERENTIAL METHOD BLD: ABNORMAL
EOSINOPHIL # BLD AUTO: 0.1 K/UL (ref 0–0.5)
EOSINOPHIL NFR BLD: 0.8 % (ref 0–8)
ERYTHROCYTE [DISTWIDTH] IN BLOOD BY AUTOMATED COUNT: 17 % (ref 11.5–14.5)
ERYTHROCYTE [SEDIMENTATION RATE] IN BLOOD BY WESTERGREN METHOD: 16 MM/H
EST. GFR  (NO RACE VARIABLE): 28.3 ML/MIN/1.73 M^2
EST. GFR  (NO RACE VARIABLE): 42 ML/MIN/1.73 M^2
FIO2: 32
FLOW: 3
GLUCOSE SERPL-MCNC: 182 MG/DL (ref 70–110)
GLUCOSE SERPL-MCNC: 251 MG/DL (ref 70–110)
HCO3 UR-SCNC: 19.6 MMOL/L (ref 24–28)
HCT VFR BLD AUTO: 24 % (ref 40–54)
HGB BLD-MCNC: 7.7 G/DL (ref 14–18)
IMM GRANULOCYTES # BLD AUTO: 0.09 K/UL (ref 0–0.04)
IMM GRANULOCYTES NFR BLD AUTO: 0.8 % (ref 0–0.5)
INR PPP: 1.9 (ref 0.8–1.2)
LDH SERPL L TO P-CCNC: 369 U/L (ref 110–260)
LYMPHOCYTES # BLD AUTO: 0.2 K/UL (ref 1–4.8)
LYMPHOCYTES NFR BLD: 2.2 % (ref 18–48)
MAGNESIUM SERPL-MCNC: 2.1 MG/DL (ref 1.6–2.6)
MCH RBC QN AUTO: 27.8 PG (ref 27–31)
MCHC RBC AUTO-ENTMCNC: 32.1 G/DL (ref 32–36)
MCV RBC AUTO: 87 FL (ref 82–98)
MODE: ABNORMAL
MONOCYTES # BLD AUTO: 0.7 K/UL (ref 0.3–1)
MONOCYTES NFR BLD: 6.5 % (ref 4–15)
NEUTROPHILS # BLD AUTO: 9.6 K/UL (ref 1.8–7.7)
NEUTROPHILS NFR BLD: 89.6 % (ref 38–73)
NRBC BLD-RTO: 0 /100 WBC
PCO2 BLDA: 34.6 MMHG (ref 35–45)
PH SMN: 7.36 [PH] (ref 7.35–7.45)
PHOSPHATE SERPL-MCNC: 2.4 MG/DL (ref 2.7–4.5)
PLATELET # BLD AUTO: 318 K/UL (ref 150–450)
PMV BLD AUTO: 8.9 FL (ref 9.2–12.9)
PO2 BLDA: 28 MMHG (ref 40–60)
PO2 BLDA: 33 MMHG (ref 40–60)
POC BE: -6 MMOL/L
POC SATURATED O2: 51 % (ref 95–100)
POC SATURATED O2: 58 % (ref 95–100)
POC TCO2: 21 MMOL/L (ref 24–29)
POCT GLUCOSE: 186 MG/DL (ref 70–110)
POCT GLUCOSE: 211 MG/DL (ref 70–110)
POCT GLUCOSE: 231 MG/DL (ref 70–110)
POCT GLUCOSE: 245 MG/DL (ref 70–110)
POCT GLUCOSE: 246 MG/DL (ref 70–110)
POCT GLUCOSE: 255 MG/DL (ref 70–110)
POCT GLUCOSE: 257 MG/DL (ref 70–110)
POTASSIUM SERPL-SCNC: 3.4 MMOL/L (ref 3.5–5.1)
POTASSIUM SERPL-SCNC: 4.1 MMOL/L (ref 3.5–5.1)
PROT SERPL-MCNC: 7.8 G/DL (ref 6–8.4)
PROTHROMBIN TIME: 20.2 SEC (ref 9–12.5)
PTH-INTACT SERPL-MCNC: 177.7 PG/ML (ref 9–77)
RBC # BLD AUTO: 2.77 M/UL (ref 4.6–6.2)
SAMPLE: ABNORMAL
SAMPLE: ABNORMAL
SITE: ABNORMAL
SITE: ABNORMAL
SODIUM SERPL-SCNC: 128 MMOL/L (ref 136–145)
SODIUM SERPL-SCNC: 131 MMOL/L (ref 136–145)
SP02: 90
WBC # BLD AUTO: 10.67 K/UL (ref 3.9–12.7)

## 2024-06-18 PROCEDURE — 63600175 PHARM REV CODE 636 W HCPCS: Performed by: REGISTERED NURSE

## 2024-06-18 PROCEDURE — 93750 INTERROGATION VAD IN PERSON: CPT | Mod: ,,, | Performed by: INTERNAL MEDICINE

## 2024-06-18 PROCEDURE — 92526 ORAL FUNCTION THERAPY: CPT

## 2024-06-18 PROCEDURE — 80053 COMPREHEN METABOLIC PANEL: CPT | Performed by: INTERNAL MEDICINE

## 2024-06-18 PROCEDURE — 83615 LACTATE (LD) (LDH) ENZYME: CPT | Performed by: STUDENT IN AN ORGANIZED HEALTH CARE EDUCATION/TRAINING PROGRAM

## 2024-06-18 PROCEDURE — 99900035 HC TECH TIME PER 15 MIN (STAT)

## 2024-06-18 PROCEDURE — 99291 CRITICAL CARE FIRST HOUR: CPT | Mod: FS,,, | Performed by: REGISTERED NURSE

## 2024-06-18 PROCEDURE — 82803 BLOOD GASES ANY COMBINATION: CPT

## 2024-06-18 PROCEDURE — 99292 CRITICAL CARE ADDL 30 MIN: CPT | Mod: ,,, | Performed by: INTERNAL MEDICINE

## 2024-06-18 PROCEDURE — 97112 NEUROMUSCULAR REEDUCATION: CPT

## 2024-06-18 PROCEDURE — 25000003 PHARM REV CODE 250: Performed by: REGISTERED NURSE

## 2024-06-18 PROCEDURE — 27000248 HC VAD-ADDITIONAL DAY

## 2024-06-18 PROCEDURE — 80048 BASIC METABOLIC PNL TOTAL CA: CPT | Performed by: STUDENT IN AN ORGANIZED HEALTH CARE EDUCATION/TRAINING PROGRAM

## 2024-06-18 PROCEDURE — 83735 ASSAY OF MAGNESIUM: CPT | Performed by: STUDENT IN AN ORGANIZED HEALTH CARE EDUCATION/TRAINING PROGRAM

## 2024-06-18 PROCEDURE — 85610 PROTHROMBIN TIME: CPT | Performed by: STUDENT IN AN ORGANIZED HEALTH CARE EDUCATION/TRAINING PROGRAM

## 2024-06-18 PROCEDURE — 25000003 PHARM REV CODE 250: Performed by: STUDENT IN AN ORGANIZED HEALTH CARE EDUCATION/TRAINING PROGRAM

## 2024-06-18 PROCEDURE — 97535 SELF CARE MNGMENT TRAINING: CPT

## 2024-06-18 PROCEDURE — 99232 SBSQ HOSP IP/OBS MODERATE 35: CPT | Mod: ,,, | Performed by: PHYSICIAN ASSISTANT

## 2024-06-18 PROCEDURE — 85730 THROMBOPLASTIN TIME PARTIAL: CPT | Performed by: STUDENT IN AN ORGANIZED HEALTH CARE EDUCATION/TRAINING PROGRAM

## 2024-06-18 PROCEDURE — 25000003 PHARM REV CODE 250: Performed by: INTERNAL MEDICINE

## 2024-06-18 PROCEDURE — 27000221 HC OXYGEN, UP TO 24 HOURS

## 2024-06-18 PROCEDURE — 63600175 PHARM REV CODE 636 W HCPCS: Performed by: PHYSICIAN ASSISTANT

## 2024-06-18 PROCEDURE — 83970 ASSAY OF PARATHORMONE: CPT | Performed by: NURSE PRACTITIONER

## 2024-06-18 PROCEDURE — 25000003 PHARM REV CODE 250: Performed by: NURSE PRACTITIONER

## 2024-06-18 PROCEDURE — 94761 N-INVAS EAR/PLS OXIMETRY MLT: CPT | Mod: XB

## 2024-06-18 PROCEDURE — 63600175 PHARM REV CODE 636 W HCPCS: Performed by: INTERNAL MEDICINE

## 2024-06-18 PROCEDURE — 84100 ASSAY OF PHOSPHORUS: CPT | Performed by: STUDENT IN AN ORGANIZED HEALTH CARE EDUCATION/TRAINING PROGRAM

## 2024-06-18 PROCEDURE — 80100014 HC HEMODIALYSIS 1:1

## 2024-06-18 PROCEDURE — C1752 CATH,HEMODIALYSIS,SHORT-TERM: HCPCS

## 2024-06-18 PROCEDURE — 85025 COMPLETE CBC W/AUTO DIFF WBC: CPT | Performed by: STUDENT IN AN ORGANIZED HEALTH CARE EDUCATION/TRAINING PROGRAM

## 2024-06-18 PROCEDURE — 97530 THERAPEUTIC ACTIVITIES: CPT

## 2024-06-18 PROCEDURE — 21400001 HC TELEMETRY ROOM

## 2024-06-18 RX ORDER — GLUCAGON 1 MG
1 KIT INJECTION
Status: DISCONTINUED | OUTPATIENT
Start: 2024-06-18 | End: 2024-06-20

## 2024-06-18 RX ORDER — INSULIN ASPART 100 [IU]/ML
0-5 INJECTION, SOLUTION INTRAVENOUS; SUBCUTANEOUS EVERY 4 HOURS PRN
Status: DISCONTINUED | OUTPATIENT
Start: 2024-06-18 | End: 2024-06-20

## 2024-06-18 RX ORDER — DEXTROSE MONOHYDRATE 100 MG/ML
INJECTION, SOLUTION INTRAVENOUS CONTINUOUS PRN
Status: DISCONTINUED | OUTPATIENT
Start: 2024-06-18 | End: 2024-06-20

## 2024-06-18 RX ORDER — POTASSIUM CHLORIDE 14.9 MG/ML
20 INJECTION INTRAVENOUS ONCE
Status: COMPLETED | OUTPATIENT
Start: 2024-06-18 | End: 2024-06-18

## 2024-06-18 RX ORDER — SODIUM CHLORIDE 9 MG/ML
INJECTION, SOLUTION INTRAVENOUS ONCE
Status: DISCONTINUED | OUTPATIENT
Start: 2024-06-18 | End: 2024-06-27

## 2024-06-18 RX ADMIN — CHOLECALCIFEROL TAB 25 MCG (1000 UNIT) 1000 UNITS: 25 TAB at 08:06

## 2024-06-18 RX ADMIN — AMIODARONE HYDROCHLORIDE 400 MG: 200 TABLET ORAL at 08:06

## 2024-06-18 RX ADMIN — ACETAMINOPHEN 650 MG: 325 TABLET ORAL at 07:06

## 2024-06-18 RX ADMIN — INSULIN ASPART 1 UNITS: 100 INJECTION, SOLUTION INTRAVENOUS; SUBCUTANEOUS at 09:06

## 2024-06-18 RX ADMIN — INSULIN ASPART 2 UNITS: 100 INJECTION, SOLUTION INTRAVENOUS; SUBCUTANEOUS at 01:06

## 2024-06-18 RX ADMIN — QUETIAPINE FUMARATE 25 MG: 25 TABLET ORAL at 08:06

## 2024-06-18 RX ADMIN — VENLAFAXINE 37.5 MG: 37.5 TABLET ORAL at 08:06

## 2024-06-18 RX ADMIN — WARFARIN SODIUM 2.5 MG: 2.5 TABLET ORAL at 05:06

## 2024-06-18 RX ADMIN — ATORVASTATIN CALCIUM 40 MG: 40 TABLET, FILM COATED ORAL at 08:06

## 2024-06-18 RX ADMIN — PANTOPRAZOLE SODIUM 40 MG: 40 TABLET, DELAYED RELEASE ORAL at 08:06

## 2024-06-18 RX ADMIN — LIDOCAINE 5% 2 PATCH: 700 PATCH TOPICAL at 12:06

## 2024-06-18 RX ADMIN — PIPERACILLIN SODIUM AND TAZOBACTAM SODIUM 4.5 G: 4; .5 INJECTION, POWDER, FOR SOLUTION INTRAVENOUS at 06:06

## 2024-06-18 RX ADMIN — PIPERACILLIN SODIUM AND TAZOBACTAM SODIUM 4.5 G: 4; .5 INJECTION, POWDER, FOR SOLUTION INTRAVENOUS at 11:06

## 2024-06-18 RX ADMIN — POTASSIUM CHLORIDE 20 MEQ: 200 INJECTION, SOLUTION INTRAVENOUS at 08:06

## 2024-06-18 RX ADMIN — ACETAMINOPHEN 650 MG: 325 TABLET ORAL at 08:06

## 2024-06-18 RX ADMIN — INSULIN ASPART 1 UNITS: 100 INJECTION, SOLUTION INTRAVENOUS; SUBCUTANEOUS at 03:06

## 2024-06-18 RX ADMIN — INSULIN ASPART 1 UNITS: 100 INJECTION, SOLUTION INTRAVENOUS; SUBCUTANEOUS at 01:06

## 2024-06-18 RX ADMIN — LIDOCAINE 5% 2 PATCH: 700 PATCH TOPICAL at 11:06

## 2024-06-18 RX ADMIN — PIPERACILLIN SODIUM AND TAZOBACTAM SODIUM 4.5 G: 4; .5 INJECTION, POWDER, FOR SOLUTION INTRAVENOUS at 02:06

## 2024-06-18 RX ADMIN — FOLIC ACID 1 MG: 1 TABLET ORAL at 08:06

## 2024-06-18 NOTE — ASSESSMENT & PLAN NOTE
-HeartMate 3 Implanted  12/1/2023  as DT  -Cont Coumadin, dosing by PharmD.  Goal INR 2.0-3.0. Subtherapeutic today.   -Antiplatelets Not on ASA  -LDH is stable overall today. Will continue to monitor daily.  -Speed set at  5000     -Interrogation notable for no events  -Not listed for OHTx, declined for OHTX due to comorbidities     Procedure: Device Interrogation Including analysis of device parameters  Current Settings: Ventricular Assist Device  Review of device function is stable        6/18/2024     6:00 AM 6/18/2024     5:00 AM 6/18/2024     4:00 AM 6/18/2024     3:00 AM 6/18/2024     2:00 AM 6/18/2024     1:00 AM 6/18/2024    12:00 AM   TXP LVAD INTERROGATIONS   Type HeartMate3 HeartMate3 HeartMate3 HeartMate3 HeartMate3 HeartMate3 HeartMate3   Flow 4.5 4.5 4.4 4.5 4.4 4.6 4.7   Speed 5000 5000 5000 5000 5000 5000 5000   PI 3.1 3.1 3.3 3.1 3.4 3.4 2.7   Power ([place]) 3.5 3.4 3.4 3.4 3.4 3.4 3.5   LSL 4600 4600 4600 4600 4600 4600 4600   Pulsatility Intermittent pulse Intermittent pulse Intermittent pulse Intermittent pulse Intermittent pulse Intermittent pulse Intermittent pulse

## 2024-06-18 NOTE — PROGRESS NOTES
Interdisciplinary Rounds Report:   Attended interdisciplinary rounds with the [facility]/CTS services including the LVAD Coordinators, social workers, cardiologists, surgeons,  PT/OT/Speech, dietician, and unit charge nurses. Discussed patient status, plan of care, goals of care, including DC date, and post discharge needs. Plan of care will be discussed with the patient and/or family per the physician while rounding on the floor. This is a recurring meeting that is medically and socially necessary to collaborate with the interdisciplinary team to assist patient needs and safe discharge.

## 2024-06-18 NOTE — PROGRESS NOTES
[facility] - [facility]  Nephrology  Progress Note    Patient Name: Radha Abbott  MRN: [id number]  Admission Date: 6/11/2024  Hospital Length of Stay: 7 days  Attending Provider: Sajan Hurley, *   Primary Care Physician: Vasu Kong MD  Principal Problem:Acute renal failure superimposed on stage 4 chronic kidney disease    Subjective:        Interval History: HD yesterday, tolerated well. Net +1.2L. Off pressors this am.     Review of patient's allergies indicates:  No Known Allergies  Current Facility-Administered Medications   Medication Frequency    0.9%  NaCl infusion (for blood administration) Q24H PRN    0.9%  NaCl infusion Continuous    0.9%  NaCl infusion PRN    0.9%  NaCl infusion Once    acetaminophen tablet 650 mg Q6H PRN    acetaminophen tablet 650 mg Q6H PRN    amiodarone tablet 400 mg Daily    atorvastatin tablet 40 mg QHS    dexmedetomidine (PRECEDEX) 400mcg/100mL 0.9% NaCL infusion Continuous    dextrose 10 % infusion Continuous PRN    dextrose 10% bolus 125 mL 125 mL PRN    dextrose 10% bolus 250 mL 250 mL PRN    [START ON 6/19/2024] epoetin zohaib injection 4,000 Units Every Mon, Wed, Fri    folic acid tablet 1 mg Daily    glucagon (human recombinant) injection 1 mg PRN    insulin aspart U-100 pen 0-5 Units Q4H PRN    LIDOcaine 5 % patch 2 patch Q24H    NORepinephrine 4 mg in dextrose 5% 250 mL infusion (premix) Continuous    pantoprazole EC tablet 40 mg Daily    piperacillin-tazobactam (ZOSYN) 4.5 g in dextrose 5 % in water (D5W) 100 mL IVPB (MB+) Q8H    QUEtiapine tablet 25 mg QHS    simethicone chewable tablet 80 mg TID PRN    trazodone split tablet 25 mg Nightly PRN    venlafaxine tablet 37.5 mg Daily    vitamin D 1000 units tablet 1,000 Units Daily    warfarin (COUMADIN) tablet 2.5 mg Daily       Objective:     Vital Signs (Most Recent):  Temp: 98 °F (36.7 °C) (06/18/24 1101)  Pulse: 75 (06/18/24 1201)  Resp: 17 (06/18/24 1201)  BP: (!) 94/55 (06/18/24 0200)  SpO2: 98  % (06/18/24 1201) Vital Signs (24h Range):  Temp:  [97.7 °F (36.5 °C)-98 °F (36.7 °C)] 98 °F (36.7 °C)  Pulse:  [] 75  Resp:  [14-29] 17  SpO2:  [80 %-100 %] 98 %  BP: ()/(0-60) 94/55  Arterial Line BP: (84-88)/(0) 88/0     Weight: 78.6 kg (173 lb 4.5 oz) (06/17/24 0501)  Body mass index is 22.86 kg/m².  Body surface area is 2.01 meters squared.    I/O last 3 completed shifts:  In: 2722.5 [I.V.:1256.4; Blood:397.9; Other:350; NG/GT:50; IV Piggyback:668.2]  Out: 800 [Other:800]     Physical Exam  Vitals and nursing note reviewed.   Constitutional:       Appearance: Normal appearance.   HENT:      Head: Normocephalic and atraumatic.   Eyes:      Conjunctiva/sclera: Conjunctivae normal.      Pupils: Pupils are equal, round, and reactive to light.   Neck:      Comments: RIJ trialysis cath.   Cardiovascular:      Rate and Rhythm: Normal rate and regular rhythm.      Comments: Smooth VAD hum  Pulmonary:      Effort: Pulmonary effort is normal.      Breath sounds: Normal breath sounds.   Abdominal:      General: Bowel sounds are normal.      Palpations: Abdomen is soft.   Musculoskeletal:         General: No swelling. Normal range of motion.      Cervical back: Normal range of motion and neck supple.   Skin:     General: Skin is warm and dry.      Capillary Refill: Capillary refill takes 2 to 3 seconds.   Neurological:      General: No focal deficit present.      Mental Status: He is alert and oriented to person, place, and time.          Significant Labs:  CBC:   Recent Labs   Lab 06/18/24  0300   WBC 10.67   RBC 2.77*   HGB 7.7*   HCT 24.0*      MCV 87   MCH 27.8   MCHC 32.1     CMP:   Recent Labs   Lab 06/17/24  0319 06/18/24  0300   GLU 96 251*   CALCIUM 8.7 8.3*   ALBUMIN 2.3*  --    PROT 7.0  --    * 128*   K 3.7 3.4*   CO2 22* 20*   CL 96 97   BUN 22 25*   CREATININE 2.4* 2.5*   ALKPHOS 287*  --    *  --    *  --    BILITOT 0.7  --      All labs within the past 24 hours have  been reviewed.         Assessment/Plan:     Cardiac/Vascular  LVAD (left ventricular assist device) present  -HTS following.    Renal/  * Acute renal failure superimposed on stage 4 chronic kidney disease  Patient with a baseline eGFR that appears to be variable however was noted to be in the 15-20 range in February before his recent IVÁN in May, presents now with fatigue and worsening IVÁN with a creatinine of 9.8 and eGFR of 5.5. BUN was noted to be elevated at 117, other electrolytes were normal. He was noted to have some asterixis on exam (which he states is chronic for him and unchanged from his baseline), however denies any other signs or symptoms of uremia. Bedside bladder scan revealed 200 mL of urine in his bladder and his last bladder void was 10 hours prior to this consult.     SLED initiated 6/13 x4 hours.  Mental status improved.  6/14:  8 hour SLED overnight, net even.    Recommendations:     -HD today for metabolic clearance and volume management, UF goal 1-2L   -Avoid nephrotoxic agents (IV contrast, NSAID's, gadolinium contrast, PPI's) if able.   -Strict I's and O's  -RFP q 12 hours while on RRT.  PRN mg/phos replacement  -Renally dose all medications  -will need OP HD upon discharge, recommend CM/SW involvement to start workup.  -will need IR evaluation once out of ICU for TDC placement.        Thank you for your consult. I will follow-up with patient. Please contact us if you have any additional questions.    Yolanda Cai DNP  Nephrology  [facility] - [facility]

## 2024-06-18 NOTE — PLAN OF CARE
POC reviewed with Radha Abbott and family. Questions and concerns addressed. CVP: 10, 10 and 5.  Svo2: 58%. Levo gtt continued @0.02. HD done, 2000 cc net UF removed.  See below and flowsheets for full assessment and VS info.       Neuro:  Michelle Coma Scale  Best Eye Response: 3-->(E3) to speech  Best Motor Response: 6-->(M6) obeys commands  Best Verbal Response: 4-->(V4) confused  [place] Coma Scale Score: 13  Assessment Qualifiers: patient not sedated/intubated  Pupil PERRLA: yes    24 hr Temp:  [97.7 °F (36.5 °C)-98 °F (36.7 °C)]     CV:  Rhythm: normal sinus rhythm   DVT prophylaxis: VTE Required Core Measure: Pharmacological prophylaxis initiated/maintained    CVP (mean): 10 mmHg (06/18/24 1101)       SVO2 (%): (S) 63 % (06/16/24 1901)       Type: HeartMate3       Pulses  Right Radial Pulse: 1+ (weak)  Left Radial Pulse: 1+ (weak)  Right Dorsalis Pedis Pulse: Doppler  Left Dorsalis Pedis Pulse: Doppler  Right Posterior Tibial Pulse: Doppler  Left Posterior Tibial Pulse: Doppler    Resp:  Flow (L/min) (Oxygen Therapy): 3  Oxygen Concentration (%): 32    GI/:  GI prophylaxis: no  Diet/Nutrition Received: tube feeding  Last Bowel Movement: 06/18/24  Voiding Characteristics: anuria   Intake/Output Summary (Last 24 hours) at 6/18/2024 1830  Last data filed at 6/18/2024 1829  Gross per 24 hour   Intake 879.04 ml   Output 2500 ml   Net -1620.96 ml     Treatment Type: Slow low efficiency dialysis  UF Rate: 500 cc/hr    Labs/Accuchecks:  Recent Labs   Lab 06/16/24  0352 06/17/24  0319 06/18/24  0300   WBC 13.49* 12.21 10.67   RBC 2.38* 2.37* 2.77*   HGB 6.6* 6.5* 7.7*   HCT 20.7* 20.9* 24.0*    379 318      Recent Labs   Lab 06/16/24  0352 06/17/24  0319 06/18/24  0300   INR 2.6* 1.8* 1.9*   APTT 43.9* 40.2* 40.3*      Recent Labs     06/17/24  0319 06/18/24  0300   * 128*   K 3.7 3.4*   CO2 22* 20*   CL 96 97   BUN 22 25*   CREATININE 2.4* 2.5*   ALKPHOS 287*  --    *  --    *  --     BILITOT 0.7  --        Recent Labs   Lab 06/11/24  1933   TROPONINI 0.051*      Recent Labs     06/15/24  1917 06/16/24  0801 06/16/24 1959 06/17/24  0736 06/17/24 2002 06/18/24  0716   PH 7.426  --  7.440  --  7.323*  --    PCO2 38.0  --  36.7  --  42.4  --    PO2 27*   < > 31* 24* 36* 33*   HCO3 25.0  --  25.0  --  22.0*  --    POCSATURATED 53   < > 63 41 65 58   BE 1  --  1  --  -4*  --     < > = values in this interval not displayed.       Electrolytes: Electrolytes replaced  Accuchecks: Q4H    Gtts/LDAs:   sodium chloride 0.9%   Intravenous Continuous   Stopped at 06/13/24 0117    dexmedeTOMIDine (Precedex) infusion (titrating)  0-1.4 mcg/kg/hr Intravenous Continuous   Stopped at 06/17/24 2222    dextrose 10 % in water (D10W)   Intravenous Continuous PRN        NORepinephrine bitartrate-D5W  0-3 mcg/kg/min Intravenous Continuous 5.9 mL/hr at 06/18/24 1829 0.02 mcg/kg/min at 06/18/24 1829       Lines/Drains/Airways       Central Venous Catheter Line  Duration             Trialysis (Dialysis) Catheter 06/12/24 0124 right internal jugular 6 days              Drain  Duration                  NG/OG Tube 06/17/24 1118  Right nostril 1 day              Line  Duration                  VAD 12/01/23 1015 Left ventricular assist device HeartMate 3 200 days                    Skin/Wounds  Bathing/Skin Care: back care;bath, complete;electrode patches/site rotation;dressed/undressed;linen changed (06/17/24 1701)  Wounds: No  Wound care consulted: No

## 2024-06-18 NOTE — PROGRESS NOTES
Update:  SW met with pt's wife (Arlyn) at bedside this morning.  Pt was resting. Pt's wife was alert and oriented, calm and cooperative.  Pt's wife reported that pt worked with therapy this morning and has been resting.  Ms Stoddard discussed pt's need for outpt HD upon dc. SW advised that we will work on arrangements closer to dc, and SW did advise that outpt HD may be a challenge with having a VAD.  Ms Stoddard expressed that they would prefer placement in [place] area.  No needs were indicated at this time per pt's wife.   SW will follow for dc planning and support.

## 2024-06-18 NOTE — PT/OT/SLP PROGRESS
Occupational Therapy   Co-Treatment  Co-treatment performed due to patient's multiple deficits requiring two skilled therapists to appropriately and safely assess patient's strength and endurance while facilitating functional tasks in addition to accommodating for patient's activity tolerance.      Name: Radha Abbott  MRN: [id number]  Admitting Diagnosis:  Acute renal failure superimposed on stage 4 chronic kidney disease       Recommendations:     Discharge Recommendations: High Intensity Therapy  Discharge Equipment Recommendations:  to be determined by next level of care  Barriers to discharge:  None    Assessment:     Radha Abbott is a 64 y.o. male with a medical diagnosis of Acute renal failure superimposed on stage 4 chronic kidney disease.  He presents with the following performance deficits affecting function are weakness, impaired endurance, impaired self care skills, impaired functional mobility, gait instability, impaired balance, decreased upper extremity function, decreased lower extremity function, decreased safety awareness, impaired cardiopulmonary response to activity, pain, decreased coordination. Pt noted with decreased command following <25%, poor verbalization AEB verbalizing 2 words throughout session, and decreased activity tolerance. He required maximal-total assist x1-2 people for all functional transfers and self care tasks performed this day. Spouse reports he is more awake/alert this day however not at his baseline. Pt remains limited in ADLs, functional mobility, and functional transfers and is currently not performing tasks at [facility]. Pt would continue to benefit from skilled OT services to maximize functional independence with ADLs and functional mobility, reduce caregiver burden, and facilitate safe discharge in the least restrictive environment.      Rehab Prognosis:  Good; patient would benefit from acute skilled OT services to address these deficits and reach maximum level of function.        Plan:     Patient to be seen 4 x/week to address the above listed problems via self-care/home management, therapeutic activities, therapeutic exercises, neuromuscular re-education  Plan of Care Expires: 07/14/24  Plan of Care Reviewed with: patient, spouse    Subjective     Chief Complaint: generalized weakness per wife  Patient/Family Comments/goals: to progress toward goals per spouse  Pain/Comfort:   0/10    Objective:   Additional staff present:  Shannon, PT    Communicated with: RN prior to session.  Patient found HOB elevated with arterial line, blood pressure cuff, LVAD, peripheral IV, pulse ox (continuous), telemetry, oxygen, Trialysis upon OT entry to room.    General Precautions: Standard, fall, LVAD    Orthopedic Precautions:N/A  Braces: N/A  Respiratory Status: Nasal cannula, flow 3.5 L/min     Occupational Performance:     Bed Mobility:    Patient completed Rolling/Turning to Left with  total assistance and 2 persons  Patient completed Rolling/Turning to Right with total assistance and 2 persons  Patient completed Scooting/Bridging with total assistance and 2 persons  Patient completed Supine to Sit with total assistance and 2 persons  Patient completed Sit to Supine with total assistance and 2 persons     Functional Mobility/Transfers:  Pt tolerated sitting edge of bed for ~20 minutes with moderate-maximal assist  Observed with posterior lean toward the right side with pt actively resisting correction of posture initially   increased verbal/tactile cueing to facilitate midline orientation, upright posture, forward gaze however unable to self correct  VSS throughout      Activities of Daily Living:  Grooming: maximal assistance for (R) UE support, initiation, and cueing to brush teeth seated EOB  Lower Body Dressing: total assistance to don socks at bed level  Toileting: total assistance for bed level pericare after BM      AMPAC 6 Click ADL: 12    Treatment & Education:  -Education on energy  conservation and task modification to maximize safety and (I) during ADLs and mobility  -Education on importance of OOB activity to improve overall activity tolerance and promote recovery  -Pt educated to call for assistance and to transfer with hospital staff only  -Provided education regarding role of OT, POC, & discharge recommendations with pt and spouse verbalizing understanding.  Pt had no further questions & when asked whether there were any concerns pt reported none.     Patient left HOB elevated with all lines intact, call button in reach, restraints reapplied at end of session, RN notified, and spouse present    GOALS:   Multidisciplinary Problems       Occupational Therapy Goals          Problem: Occupational Therapy    Goal Priority Disciplines Outcome Interventions   Occupational Therapy Goal     OT, PT/OT Progressing    Description: Goals to be met by: 7/14/24     Patient will increase functional independence with ADLs by performing:    UE Dressing with Set-up Assistance.  LE Dressing with Minimal Assistance.  Grooming while standing at sink with Stand-by Assistance.  Toileting from toilet/bedside commode with Minimal Assistance for hygiene and clothing management.   Supine to sit with Stand-by Assistance.  Step transfer with Contact Guard Assistance  Toilet transfer to toilet/bedside commode with Contact Guard Assistance.                         Time Tracking:     OT Date of Treatment: 06/18/24  OT Start Time: 0906  OT Stop Time: 0950  OT Total Time (min): 44 min    Billable Minutes:Self Care/Home Management 29  Neuromuscular Re-education 15    OT/PRIETO: OT          6/18/2024

## 2024-06-18 NOTE — PROGRESS NOTES
[facility] - [facility]  Endocrinology  Progress Note    Admit Date: 6/11/2024     Reason for Consult: Management of T2DM, Hyperglycemia     Surgical Procedure and Date: HM3 placement as DT (12/1/23)     Diabetes diagnosis year: several years ago    Home Diabetes Medications:  Amaryl 1 mg (recently prescribed by PCP about a week ago)     Lab Results   Component Value Date    HGBA1C 7.4 (H) 04/17/2024       How often checking glucose at home? Once daily in the AM ( prior to starting Amaryl BG in the 300s, since starting Amaryl BG 70-130s)   BG readings on regimen: 70-130s  Hypoglycemia on the regimen?  No  Missed doses on regimen?  No    Diabetes Complications include:     Hypoglycemia , Diabetic nephropathy  , Diabetic chronic kidney disease     , and Diabetic peripheral neuropathy     Complicating diabetes co morbidities:   CHF and CKD      HPI:   Patient is a 64 y.o. male with a diagnosis of stage D HFrEF, ICMP who underwent HM3 placement as DT (12/1/23) w/ a hospital stay complicated by vasoplegia(requiring Giaprezza), debility, malnutrition/impaired swallowing, and renal failure requiring HD (since weaned off) w/ recent discharge for ADHF presented to the ED with wife due to c/o worsening generalized weakness since 6/10. Per wife patient was mostly sleeping during the day despite sleeping 10 hrs at night. Patient also admits decreased appetite and decreased urine output despite taking his medications as prescribed. He has chronic shortness of breath on exertion. Denies confusion, syncope, diarrhea, constipation, N/V, dysuria, or other complaints. Patient had his prior tunnel cath removed used for HD about 2 weeks ago. He completed a 6 week therapy for Enterococcus bacteremia with ampicillin and rocephin ending 5/30/24 and was switched to PO antibiotics. Off note, patient was recently started by his PCP on glimepiride 1mg and unclear of BG trends during the day. Per patient last fasting  on 6/11.  "Patient reports lasting taking the glimepiride on 6/11 AM.   Admitted for worsening renal function with a BUN>100/Cr. 9.9. Endocrinology consulted for management of T2DM.          Interval HPI:   No acute events overnight. Patient in room [id number] A. Blood glucose stable. BG at , above and below goal on current insulin regimen (None). Steroid use- None .   Renal function- Abnormal -    Vasopressors-  None      Diet Renal Coumadin Restriction; Fluid - 2000mL      Eating:   <25%  Nausea: No  Hypoglycemia and intervention: No  Fever: No  TPN and/or TF: Started on TF yesterday       BP (!) 94/55   Pulse 88   Temp 97.9 °F (36.6 °C) (Oral)   Resp 15   Ht 6' 1" (1.854 m)   Wt 78.6 kg (173 lb 4.5 oz)   SpO2 97%   BMI 22.86 kg/m²     Labs Reviewed and Include    Recent Labs   Lab 06/18/24  0300   *   CALCIUM 8.3*   *   K 3.4*   CO2 20*   CL 97   BUN 25*   CREATININE 2.5*     Lab Results   Component Value Date    WBC 10.67 06/18/2024    HGB 7.7 (L) 06/18/2024    HCT 24.0 (L) 06/18/2024    MCV 87 06/18/2024     06/18/2024     No results for input(s): "TSH", "FREET4" in the last 168 hours.  Lab Results   Component Value Date    HGBA1C 7.4 (H) 04/17/2024       Nutritional status:   Body mass index is 22.86 kg/m².  Lab Results   Component Value Date    ALBUMIN 2.3 (L) 06/17/2024    ALBUMIN 2.5 (L) 06/16/2024    ALBUMIN 2.5 (L) 06/15/2024     Lab Results   Component Value Date    PREALBUMIN 15 (L) 06/17/2024    PREALBUMIN 22 06/14/2024    PREALBUMIN 25 06/12/2024       Estimated Creatinine Clearance: 34.1 mL/min (A) (based on SCr of 2.5 mg/dL (H)).    Accu-Checks  Recent Labs     06/16/24  1609 06/16/24  1734 06/16/24  1957 06/17/24  0755 06/17/24  1257 06/17/24  1606 06/17/24  1957 06/18/24  0103 06/18/24  0300 06/18/24  0713   POCTGLUCOSE 78 82 98 109 137* 117* 178* 255* 246* 211*       Current Medications and/or Treatments Impacting Glycemic Control  Immunotherapy:    Immunosuppressants       " None          Steroids:   Hormones (From admission, onward)      None          Pressors:    Autonomic Drugs (From admission, onward)      Start     Stop Route Frequency Ordered    06/17/24 2245  NORepinephrine 4 mg in dextrose 5% 250 mL infusion (premix)        Question Answer Comment   Begin at (in mcg/kg/min): 0.02    Titrate by: (in mcg/kg/min (RANGE PREFERRED) 0.02 - 0.2    Titrate interval: (in minutes) (RANGE PREFERRED) 2 - 5    Titrate to maintain: (MAP or SBP) MAP    Titrate to keep MAP within the range or GREATER than (if single number): (in mmHg) OTHER    Other >60    Maximum dose: (in mcg/kg/min) 3        -- IV Continuous 06/17/24 2237          Hyperglycemia/Diabetes Medications:   Antihyperglycemics (From admission, onward)      Start     Stop Route Frequency Ordered    06/18/24 1011  insulin aspart U-100 pen 0-5 Units         -- SubQ Every 4 hours PRN 06/18/24 0912            ASSESSMENT and PLAN    Cardiac/Vascular  LVAD (left ventricular assist device) present  Managed per primary team  Avoid hypoglycemia        Renal/  * Acute renal failure superimposed on stage 4 chronic kidney disease  Titrate insulin slowly to avoid hypoglycemia as the risk of hypoglycemia increases with decreased creatinine clearance.        Endocrine  Type 2 diabetes mellitus without complication, without long-term current use of insulin  BG goal 140-180  T2DM.  LVAD. Admitted for worsening renal function with a BUN>100/Cr. 9.9. Hypoglycemia likely secondary to RIVAS (glimepiride) with worsened kidney function. Last took on 6/11. D10 drip stopped and started on TF yesterday.  Will monitor on correction insulin for now and consider scheduled insulin with TF..    Plan:  -Novolog /50  -BG monitoring ac/hs  -Hypoglycemia protocol in place    ** Please call Endocrine for any BG related issues **    Discharge plans:   BRANDO  -D/C Camila Wilson PA-C  Endocrinology  [facility] - [facility]

## 2024-06-18 NOTE — ASSESSMENT & PLAN NOTE
BG goal 140-180  T2DM.  LVAD. Admitted for worsening renal function with a BUN>100/Cr. 9.9. Hypoglycemia likely secondary to RIVAS (glimepiride) with worsened kidney function. Last took on 6/11. D10 drip stopped and started on TF yesterday.  Will monitor on correction insulin for now and consider scheduled insulin with TF..    Plan:  -Novolog /50  -BG monitoring ac/hs  -Hypoglycemia protocol in place    ** Please call Endocrine for any BG related issues **    Discharge plans:   TBD  -D/C Amaryl

## 2024-06-18 NOTE — ASSESSMENT & PLAN NOTE
Chronic with no acute bleeding, multifactorial 2/2 CKD4 and chronic anticoagulation  -Iron panel done. Iron deficient but Ferritin is elevated at 1186  -Homocysteine normal and methylmalonic pending   -s/p 1 pRBC yesterday   -Epo to start tomorrow   -Continue to trend CBC's

## 2024-06-18 NOTE — ASSESSMENT & PLAN NOTE
Patient with a baseline eGFR that appears to be variable however was noted to be in the 15-20 range in February before his recent IVÁN in May, presents now with fatigue and worsening IVÁN with a creatinine of 9.8 and eGFR of 5.5. BUN was noted to be elevated at 117, other electrolytes were normal. He was noted to have some asterixis on exam (which he states is chronic for him and unchanged from his baseline), however denies any other signs or symptoms of uremia. Bedside bladder scan revealed 200 mL of urine in his bladder and his last bladder void was 10 hours prior to this consult.     SLED initiated 6/13 x4 hours.  Mental status improved.  6/14:  8 hour SLED overnight, net even.    Recommendations:     -HD today for metabolic clearance and volume management, UF goal 1-2L   -Avoid nephrotoxic agents (IV contrast, NSAID's, gadolinium contrast, PPI's) if able.   -Strict I's and O's  -RFP q 12 hours while on RRT.  PRN mg/phos replacement  -Renally dose all medications  -will need OP HD upon discharge, recommend CM/SW involvement to start workup.  -will need IR evaluation once out of ICU for TDC placement.

## 2024-06-18 NOTE — PT/OT/SLP PROGRESS
Physical Therapy Co-Treatment    Patient Name:  Radha Abbott   MRN:  [id number]    Recommendations:     Discharge Recommendations: Moderate Intensity Therapy  Discharge Equipment Recommendations: hospital bed   Barriers to discharge:  ongoing medical care; inc level of assist required    Assessment:     Radha Abbott is a 64 y.o. male admitted with a medical diagnosis of Acute renal failure superimposed on stage 4 chronic kidney disease.  He presents with the following impairments/functional limitations: weakness, impaired endurance, impaired self care skills, impaired functional mobility, gait instability, impaired balance, impaired cognition, decreased coordination, decreased upper extremity function, decreased lower extremity function, decreased safety awareness, impaired cardiopulmonary response to activity (Simultaneous filing. User may not have seen previous data.). Pt awake but w/ dec alertness, frequently staring off, following <25% of commands and only verbalizing 2 words throughout entirety of session despite frequent encouragement to tell therapists how he was feeling. He requires significant assistance for all functional mobility w/ difficulty discerning if pt resisting mobility vs not comprehending situation, w/ pt not answering enough questions for therapists to determine orientation.    Rehab Prognosis: Fair; patient would benefit from acute skilled PT services to address these deficits and reach maximum level of function.    Recent Surgery: * No surgery found *      Plan:     During this hospitalization, patient to be seen 4 x/week to address the identified rehab impairments via gait training, therapeutic activities, therapeutic exercises, neuromuscular re-education and progress toward the following goals:    Plan of Care Expires:  07/14/24    Subjective     Chief Complaint: NA 2/2 pt minimally verbal/interactive  Patient/Family Comments/goals: pt's wife wants to see him improve  Pain/Comfort:  Pain  Rating 1: 0/10 (Simultaneous filing. User may not have seen previous data.)  Pain Rating Post-Intervention 1: 0/10 (Simultaneous filing. User may not have seen previous data.)      Objective:     Communicated with RN prior to session.  Patient found HOB elevated with arterial line, blood pressure cuff, LVAD, peripheral IV, pulse ox (continuous), telemetry, Trialysis, NG tube, restraints (Simultaneous filing. User may not have seen previous data.) upon PT entry to room. Co-tx w/ OT 2/2 suspected pt complexity and requirement of 2 skilled therapists to assist in order to maximize pt treatment      General Precautions: Standard, fall, LVAD (Simultaneous filing. User may not have seen previous data.)  Orthopedic Precautions: N/A (Simultaneous filing. User may not have seen previous data.)  Braces: N/A (Simultaneous filing. User may not have seen previous data.)  Respiratory Status: Room air     Functional Mobility:  Bed Mobility:     Rolling Left:  total assistance  Rolling Right: total assistance  Scooting: total assistance and of 2 persons  Supine to Sit: total assistance and of 2 persons  Sit to Supine: total assistance and of 2 persons  Balance: EOB sitting balance mod-maxA w/ posterior lean and intermittent R and L lean w/ pt occassionally pushing himself to the side w/ poor midline orientation; able to sit EOB w/ assistance for 20min      AM-PAC 6 CLICK MOBILITY  Turning over in bed (including adjusting bedclothes, sheets and blankets)?: 1  Sitting down on and standing up from a chair with arms (e.g., wheelchair, bedside commode, etc.): 1  Moving from lying on back to sitting on the side of the bed?: 1  Moving to and from a bed to a chair (including a wheelchair)?: 1  Need to walk in hospital room?: 1  Climbing 3-5 steps with a railing?: 1  Basic Mobility Total Score: 6       Treatment & Education:  Pt educated on PT POC/goals, d/c recs, and continued treatment. All questions answered and pt's wife in agreement w/  POC.  Sitting balance w/ verbal, visual, and tactile cueing for muscle engagement, midline orientation, BUE placement to assist w/ postural control, attention to task, and safety awareness  Pt having just finished on bed pan at PT arrival, inc time spent rolling in bed and performing pericare w/ total A  Rolling L/R in bed to reposition pt w/ wedges and pillows to offload pressure, avoid muscle contractures, and prevent skin breakdown. Pt and family educated on importance of pt being rotated every 2hrs for these reasons.     Patient left right sidelying with all lines intact, call button in reach, restraints reapplied at end of session, RN notified, and wife present..    GOALS:   Multidisciplinary Problems       Physical Therapy Goals          Problem: Physical Therapy    Goal Priority Disciplines Outcome Goal Variances Interventions   Physical Therapy Goal     PT, PT/OT Progressing     Description: Goals to be completed by: 7/14/24    Pt will perform sup<>sit transfers w/ contact guard assistance  Pt will have sufficient dynamic balance to sit EOB while performing ADLs/therex w/ supervision  Pt will be able to stand up from EOB w/ contact guard assistance using LRAD  Pt will ambulate 100 feet w/ minimum assistance using LRAD  Pt will be independent w/ HEP therex on BLE w/ good form and ROM                        Time Tracking:     PT Received On: 06/18/24  PT Start Time: 0906     PT Stop Time: 0950  PT Total Time (min): 44 min     Billable Minutes: Therapeutic Activity 15 and Neuromuscular Re-education 29    Treatment Type: Treatment  PT/PTA: PT     Number of PTA visits since last PT visit: 0     06/18/2024

## 2024-06-18 NOTE — PROGRESS NOTES
[facility] - Cardiac Intensive Care  Heart Transplant  Progress Note    Patient Name: Radha Abbott  MRN: [id number]  Admission Date: 6/11/2024  Hospital Length of Stay: 7 days  Attending Physician: Sajan Hurley, *  Primary Care Provider: Vasu Kong MD  Principal Problem:Acute renal failure superimposed on stage 4 chronic kidney disease    Subjective:   Interval History: Hypotensive overnight requiring Levophed (suspect sedation related to Precedex). More awake and oriented x2 today. Appears hypervolemic on exam though CVP is 10. Net +1.2L in last 24hrs. Did receive session of HD yesterday and got 1 pRBC with appropriate response in H&H. Will discuss with Nephro about volume removal today. WBC 10 and afebrile. Tolerating TF's. Will have speech therapy evaluate.       Continuous Infusions:   sodium chloride 0.9%   Intravenous Continuous   Stopped at 06/13/24 0117    dexmedeTOMIDine (Precedex) infusion (titrating)  0-1.4 mcg/kg/hr Intravenous Continuous   Stopped at 06/17/24 2222    dextrose 10 % in water (D10W)   Intravenous Continuous PRN        NORepinephrine bitartrate-D5W  0-3 mcg/kg/min Intravenous Continuous 11.8 mL/hr at 06/18/24 0600 0.04 mcg/kg/min at 06/18/24 0600     Scheduled Meds:   sodium chloride 0.9%   Intravenous Once    amiodarone  400 mg Oral Daily    atorvastatin  40 mg Oral QHS    [START ON 6/19/2024] epoetin zohaib (PROCRIT) injection  4,000 Units Intravenous Every Mon, Wed, Fri    folic acid  1 mg Oral Daily    LIDOcaine  2 patch Transdermal Q24H    pantoprazole  40 mg Oral Daily    piperacillin-tazobactam (Zosyn) IV (PEDS and ADULTS) (extended infusion is not appropriate)  4.5 g Intravenous Q8H    QUEtiapine  25 mg Oral QHS    venlafaxine  37.5 mg Oral Daily    vitamin D  1,000 Units Oral Daily    warfarin  2.5 mg Oral Daily     PRN Meds:  Current Facility-Administered Medications:     0.9%  NaCl infusion (for blood administration), , Intravenous, Q24H PRN    sodium chloride 0.9%,  , Intravenous, PRN    acetaminophen, 650 mg, Oral, Q6H PRN    acetaminophen, 650 mg, Per NG tube, Q6H PRN    dextrose 10 % in water (D10W), , Intravenous, Continuous PRN    dextrose 10%, 12.5 g, Intravenous, PRN    dextrose 10%, 25 g, Intravenous, PRN    glucagon (human recombinant), 1 mg, Intramuscular, PRN    insulin aspart U-100, 0-5 Units, Subcutaneous, Q4H PRN    simethicone, 1 tablet, Oral, TID PRN    traZODone, 25 mg, Oral, Nightly PRN    Review of patient's allergies indicates:  No Known Allergies  Objective:     Vital Signs (Most Recent):  Temp: 97.9 °F (36.6 °C) (06/18/24 0701)  Pulse: 75 (06/18/24 1001)  Resp: 15 (06/18/24 1001)  BP: (!) 94/55 (06/18/24 0200)  SpO2: 96 % (06/18/24 1001) Vital Signs (24h Range):  Temp:  [97.7 °F (36.5 °C)-99.4 °F (37.4 °C)] 97.9 °F (36.6 °C)  Pulse:  [] 75  Resp:  [14-29] 15  SpO2:  [80 %-100 %] 96 %  BP: ()/(0-61) 94/55  Arterial Line BP: (84-88)/(0) 88/0     Patient Vitals for the past 72 hrs (Last 3 readings):   Weight   06/17/24 0501 78.6 kg (173 lb 4.5 oz)   06/16/24 0501 79.6 kg (175 lb 7.8 oz)   06/15/24 1127 79.8 kg (176 lb)     Body mass index is 22.86 kg/m².      Intake/Output Summary (Last 24 hours) at 6/18/2024 1056  Last data filed at 6/18/2024 0600  Gross per 24 hour   Intake 1965.55 ml   Output 800 ml   Net 1165.55 ml         Telemetry: SR       Physical Exam  Constitutional:       Appearance: Normal appearance.   HENT:      Head: Normocephalic and atraumatic.   Eyes:      Conjunctiva/sclera: Conjunctivae normal.      Pupils: Pupils are equal, round, and reactive to light.   Neck:      Comments: JVD visible   Cardiovascular:      Rate and Rhythm: Normal rate and regular rhythm.      Comments: Smooth VAD hum  Pulmonary:      Effort: Pulmonary effort is normal.      Breath sounds: Normal breath sounds.   Abdominal:      General: Bowel sounds are normal.      Palpations: Abdomen is soft.   Musculoskeletal:         General: No swelling. Normal range  of motion.      Cervical back: Normal range of motion and neck supple.   Skin:     General: Skin is warm and dry.      Capillary Refill: Capillary refill takes 2 to 3 seconds.   Neurological:      General: No focal deficit present.      Mental Status: He is alert.      Comments: Oriented x2. Intermittent confusion/agitation             Significant Labs:  CBC:  Recent Labs   Lab 06/16/24  0352 06/17/24  0319 06/18/24  0300   WBC 13.49* 12.21 10.67   RBC 2.38* 2.37* 2.77*   HGB 6.6* 6.5* 7.7*   HCT 20.7* 20.9* 24.0*    379 318   MCV 87 88 87   MCH 27.7 27.4 27.8   MCHC 31.9* 31.1* 32.1     BNP:  Recent Labs   Lab 06/11/24  1933 06/14/24  0221 06/17/24  0319   BNP 1,388* 1,542* 1,023*     CMP:  Recent Labs   Lab 06/14/24  0221 06/14/24  1313 06/15/24  0234 06/15/24  1401 06/15/24  2245 06/16/24  0352 06/16/24  1734 06/17/24  0319 06/18/24  0300   *  129*  129*   < > 77  77  77  77   < > 69* 54*  54* 77 96 251*   CALCIUM 9.0  9.0  9.0   < > 8.9  8.9  8.9  8.9   < > 8.9 8.7  8.7 8.6* 8.7 8.3*   ALBUMIN 2.5*  2.5*  2.5*   < > 2.5*  2.5*  2.5*  2.5*   < > 2.5* 2.5*  --  2.3*  --    PROT 7.6  --  7.8  --   --   --   --  7.0  --    *  129*  129*   < > 129*  129*  129*  129*   < > 129* 130*  130* 130* 131* 128*   K 4.7  4.7  4.7   < > 4.6  4.6  4.6  4.6   < > 4.3 4.0  4.0 3.5 3.7 3.4*   CO2 17*  17*  17*   < > 18*  18*  18*  18*   < > 22* 22*  22* 22* 22* 20*     100  100   < > 99  99  99  99   < > 96 96  96 95 96 97   BUN 30*  30*  30*   < > 11  11  11  11   < > 7* 6*  6* 13 22 25*   CREATININE 3.3*  3.3*  3.3*   < > 1.3  1.3  1.3  1.3   < > 0.8 0.8  0.8 1.6* 2.4* 2.5*   ALKPHOS 165*  --  253*  --   --   --   --  287*  --    ALT 65*  --  148*  --   --   --   --  285*  --    AST 64*  --  207*  --   --   --   --  325*  --    BILITOT 0.4  --  0.7  --   --   --   --  0.7  --     < > = values in this interval not displayed.      Coagulation:   Recent  Labs   Lab 06/16/24  0352 06/17/24  0319 06/18/24  0300   INR 2.6* 1.8* 1.9*   APTT 43.9* 40.2* 40.3*     LDH:  Recent Labs   Lab 06/16/24  0352 06/17/24  0319 06/18/24  0300   * 468* 369*     Microbiology:  Microbiology Results (last 7 days)       Procedure Component Value Units Date/Time    Culture, Respiratory with Gram Stain [[id number]]     Order Status: No result Specimen: Respiratory     Blood culture #1 **CANNOT BE ORDERED STAT** [[id number]] Collected: 06/11/24 2122    Order Status: Completed Specimen: Blood from Peripheral, Antecubital, Right Updated: 06/16/24 2212     Blood Culture, Routine No growth after 5 days.    Blood culture #2 **CANNOT BE ORDERED STAT** [[id number]] Collected: 06/11/24 2121    Order Status: Completed Specimen: Blood from Peripheral, Hand, Left Updated: 06/16/24 2212     Blood Culture, Routine No growth after 5 days.    Urine culture [[id number]] Collected: 06/12/24 1422    Order Status: Completed Specimen: Urine Updated: 06/13/24 2113     Urine Culture, Routine No growth    Narrative:      Specimen Source->Urine            I have reviewed all pertinent labs within the past 24 hours.    Estimated Creatinine Clearance: 34.1 mL/min (A) (based on SCr of 2.5 mg/dL (H)).    Diagnostic Results:  I have reviewed and interpreted all pertinent imaging results/findings within the past 24 hours.  Assessment and Plan:     Mr. Radha Abbott is a 63 yo male with stage D HFrEF, ICMP who underwent HM3 placement as DT (12/1/23) w/ a hospital stay complicated by vasoplegia(requiring Giaprezza), debility, malnutrition/impaired swallowing, and renal failure requiring HD (since weaned off) w/ recent discharge for ADHF presented to the ED with wife due to c/o worsening generalized weakness since 6/10. Per wife patient was mostly sleeping during the day despite sleeping 10 hrs at night. Patient also admits decreased appetite and decreased urine output despite taking his medications as prescribed. He  has not urinated since 6/11 at 2pm. He has chronic shortness of breath on exertion. Denies confusion, syncope, diarrhea, constipation, N/V, dysuria, or other complaints. Patient had his prior tunnel cath removed used for HD about 2 weeks ago. He completed a 6 week therapy for Enterococcus bacteremia with ampicillin and rocephin ending 5/30/24a and was switched to PO antibiotics.     On prior hospital stay nephrology consulted during hospital stay due to elevated renal function. They feel he is close to being a dialysis patient. He diuresed on IV Bumex with prn Metolazone. He is net negative 1.2L throughout his hospital stay and weight on day of discharge was 179lbs down from 186lbs on admit. We continued his home dose of Bumex 4mg BID but increased his prn Metolazone to 10mg. He received a dose of Epo while inpatient and he has plans to follow up with Heme/Onc to get outpatient therapy arranged.      2D Echo with CFD done on 3/26/2024  LVAD: There is a Heartmate III LVAD running at 5000 RPM. The aortic valve does not open. The ventricular septum is at midline. Inflow cannula well seated at the apex. Outflow graft not visualized.   Left Ventricle: The left ventricle is moderately dilated. Normal wall thickness. Global hypokinesis present. Septal motion is abnormal. There is severely reduced systolic function with a visually estimated ejection fraction of 5 - 10%. There is diastolic dysfunction but grade cannot be determined.   Right Ventricle: Mild right ventricular enlargement. Wall thickness is normal. Right ventricle wall motion is normal. Systolic function is normal. Pacemaker lead present in the ventricle.   Left Atrium: Left atrium is severely dilated.   Right Atrium: Right atrium is moderately dilated.   Mitral Valve: The mitral valve is repaired with an Noble stitch. There is mild regurgitation.   IVC/SVC: Normal venous pressure at 3 mmHg.              * Acute renal failure superimposed on stage 4 chronic  kidney disease  Mr. Radha Abbott is a 65 yo male with stage D HFrEF, ICMP who underwent HM3 placement as DT, DM2, CKD4, HTN, E.faecalis bacteremia who presented due to c/o worsening generalized weakness since 6/10. Admitted for worsening renal function with a BUN>100/Cr. 9.9.     - CT Ab/pel: Kidneys/ Ureters: Normal in size and location.  Mild bilateral perinephric stranding, a nonspecific finding and similar to prior.  No hydronephrosis or nephrolithiasis. No ureteral dilatation.  - Nephrology consulted, appreciate recs.   -Tolerated HD trial yesterday   - Will need OP HD upon discharge, recommend CM/SW involvement to start workup.  - Will need IR evaluation once out of ICU for TDC placement.  - Renally dosing medications      LVAD (left ventricular assist device) present  -HeartMate 3 Implanted  12/1/2023  as DT  -Cont Coumadin, dosing by PharmD.  Goal INR 2.0-3.0. Subtherapeutic today.   -Antiplatelets Not on ASA  -LDH is stable overall today. Will continue to monitor daily.  -Speed set at  5000     -Interrogation notable for no events  -Not listed for OHTx, declined for OHTX due to comorbidities     Procedure: Device Interrogation Including analysis of device parameters  Current Settings: Ventricular Assist Device  Review of device function is stable        6/18/2024     6:00 AM 6/18/2024     5:00 AM 6/18/2024     4:00 AM 6/18/2024     3:00 AM 6/18/2024     2:00 AM 6/18/2024     1:00 AM 6/18/2024    12:00 AM   TXP LVAD INTERROGATIONS   Type HeartMate3 HeartMate3 HeartMate3 HeartMate3 HeartMate3 HeartMate3 HeartMate3   Flow 4.5 4.5 4.4 4.5 4.4 4.6 4.7   Speed 5000 5000 5000 5000 5000 5000 5000   PI 3.1 3.1 3.3 3.1 3.4 3.4 2.7   Power ([place]) 3.5 3.4 3.4 3.4 3.4 3.4 3.5   LSL 4600 4600 4600 4600 4600 4600 4600   Pulsatility Intermittent pulse Intermittent pulse Intermittent pulse Intermittent pulse Intermittent pulse Intermittent pulse Intermittent pulse         Aspiration pneumonia of both lungs  -CT chest and  CXR concerning for pneumonia   -Sputum cx ordered  -Continue  Zosyn     Delirium  -slowly improving   -CT head-toe neg.   -Continue Seroquel.  -Precedex HS for agitation     Hypoglycemia  -Endocrine following. Resolved with starting tube feeds     Bacteremia due to Enterococcus  He completed a 6 week therapy for Enterococcus bacteremia with ampicillin and rocephin ending 5/30/24a and was switched to Amoxicillin for suppression    Anticoagulant long-term use  Cont warfarin    Anemia  Chronic with no acute bleeding, multifactorial 2/2 CKD4 and chronic anticoagulation  -Iron panel done. Iron deficient but Ferritin is elevated at 1186  -Homocysteine normal and methylmalonic pending   -s/p 1 pRBC yesterday   -Epo to start tomorrow   -Continue to trend CBC's    Adjustment disorder with depressed mood  Cont SSRI    PAF (paroxysmal atrial fibrillation)  -Continue amiodarone and Coumadin      Type 2 diabetes mellitus without complication, without long-term current use of insulin  -Endocrine    Acute on chronic combined systolic and diastolic heart failure  Owing to his advanced kidney disease and significantly elevated creat, he is not on a  ACEI/ARB/ARNI or MRA. He is also not on a beta blocker due to RV dysfunction.     Plan:   - See LVAD  - See IVÁN    CAD (coronary artery disease)  -Continue statin    Uninterrupted Critical Care/Counseling Time (not including procedures): 60 mins     Hugh Duran NP  Heart Transplant  [facility] - Cardiac Intensive Care

## 2024-06-18 NOTE — PT/OT/SLP PROGRESS
Speech Language Pathology Treatment    Patient Name:  Radha Abbott   MRN:  [id number]  Admitting Diagnosis: Acute renal failure superimposed on stage 4 chronic kidney disease    Recommendations:                 General Recommendations:  Dysphagia therapy  Diet recommendations:  NPO, Liquid Diet Level: NPO   Aspiration Precautions: Continue alternate means of nutrition   General Precautions: Standard,    Communication strategies:  none    Assessment:     Radha Abbott is a 64 y.o. male with an SLP diagnosis of Dysphagia.    Subjective     Pt awake   Spouse at the bedside  RN in agreement with attempting at this time     Pain/Comfort:  Pain Rating 1: 0/10  Pain Rating Post-Intervention 1: 0/10    Respiratory Status: Room air    Objective:     Has the patient been evaluated by SLP for swallowing?   Yes  Keep patient NPO? No   Current Respiratory Status:        Pt with significant change in status since initial assessment. Pt with overall delayed responses and refusals. Pt now with NG tube in place. Pt would not accept any PO trials despite SLP and spouse max cueing and coaxing. Pt would not open mouth to accept. Given severity of persistent confusion and refusals of PO ongoing NPO appears safest with continued NG tube feeds and ice chips and oral swabs for pleasure when awake alert and requesting. Speech to continue to closely follow.     Goals:   Multidisciplinary Problems       SLP Goals          Problem: SLP    Goal Priority Disciplines Outcome   SLP Goal     SLP    Description: Speech Language Pathology Goals  Goals expected to be met by    1. Pt will tolerate regular unrestricted diet without any overt clinical signs of aspiration                        Plan:     Patient to be seen:  4 x/week   Plan of Care expires:     Plan of Care reviewed with:  patient   SLP Follow-Up:  Yes       Discharge recommendations:      Barriers to Discharge:  None    Time Tracking:     SLP Treatment Date:   06/18/24  Speech Start Time:   1004  Speech Stop Time:  1012     Speech Total Time (min):  8 min    Billable Minutes: Treatment Swallowing Dysfunction 8    06/18/2024

## 2024-06-18 NOTE — PLAN OF CARE
Cardiac ICU Care Plan    POC reviewed with Radha Abbott and family. . See below and flowsheets for full assessment and VS info.   CVP: 10, 9, 10  Sv02: 65  3L nc  VAD: no alarms  Gtts: Levo 0.06 for Doppler MAP in the 50s  Tube feeds restarted post-NGT placement check, D10 stopped at this time per MD Khan.   K 3.4, no replacement orders at this time per MD Khan.    Intermittent pulsatility during the shift.    Neuro:  [place] Coma Scale  Best Eye Response: 3-->(E3) to speech  Best Motor Response: 5-->(M5) localizes pain  Best Verbal Response: 4-->(V4) confused  [place] Coma Scale Score: 12  Assessment Qualifiers: patient chemically sedated or paralyzed, no eye obstruction present  Pupil PERRLA: no    24 hr Temp:  [97.7 °F (36.5 °C)-99.4 °F (37.4 °C)]      CV:  Rhythm: normal sinus rhythm   DVT prophylaxis: VTE Required Core Measure: Pharmacological prophylaxis initiated/maintained    CVP (mean): 10 mmHg (06/18/24 0300)       SVO2 (%): (S) 63 % (06/16/24 1901)       Type: HeartMate3       Pulses  Right Radial Pulse: Doppler  Left Radial Pulse: Doppler  Right Dorsalis Pedis Pulse: Doppler  Left Dorsalis Pedis Pulse: Doppler  Right Posterior Tibial Pulse: Doppler  Left Posterior Tibial Pulse: Doppler    Resp:  Flow (L/min) (Oxygen Therapy): 3  Oxygen Concentration (%): 32    GI/:  GI prophylaxis: no  Diet/Nutrition Received: tube feeding  Last Bowel Movement: 06/16/24  Voiding Characteristics: anuria   Intake/Output Summary (Last 24 hours) at 6/18/2024 0458  Last data filed at 6/18/2024 0400  Gross per 24 hour   Intake 2036.69 ml   Output 800 ml   Net 1236.69 ml     Treatment Type: Slow low efficiency dialysis  UF Rate: 500 cc/hr  Nutritional Supplement Intake: Quantity 0, Type:  n/a    Labs/Accuchecks:  Recent Labs   Lab 06/16/24  0352 06/17/24  0319 06/18/24  0300   WBC 13.49* 12.21 10.67   RBC 2.38* 2.37* 2.77*   HGB 6.6* 6.5* 7.7*   HCT 20.7* 20.9* 24.0*    379 318      Recent Labs   Lab  06/16/24  0352 06/17/24  0319 06/18/24  0300   INR 2.6* 1.8* 1.9*   APTT 43.9* 40.2* 40.3*      Recent Labs     06/17/24  0319 06/18/24  0300   * 128*   K 3.7 3.4*   CO2 22* 20*   CL 96 97   BUN 22 25*   CREATININE 2.4* 2.5*   ALKPHOS 287*  --    *  --    *  --    BILITOT 0.7  --        Recent Labs   Lab 06/11/24  1933   TROPONINI 0.051*      Recent Labs     06/15/24  1917 06/16/24  0801 06/16/24  1959 06/17/24  0736 06/17/24 2002   PH 7.426  --  7.440  --  7.323*   PCO2 38.0  --  36.7  --  42.4   PO2 27*   < > 31* 24* 36*   HCO3 25.0  --  25.0  --  22.0*   POCSATURATED 53   < > 63 41 65   BE 1  --  1  --  -4*    < > = values in this interval not displayed.       Electrolytes: N/A - electrolytes WDL  Accuchecks: Q4H    Gtts/LDAs:   sodium chloride 0.9%   Intravenous Continuous   Stopped at 06/13/24 0117    dexmedeTOMIDine (Precedex) infusion (titrating)  0-1.4 mcg/kg/hr Intravenous Continuous   Stopped at 06/17/24 2222    dextrose 10 % in water (D10W)   Intravenous Continuous 25 mL/hr at 06/18/24 0400 Rate Verify at 06/18/24 0400    NORepinephrine bitartrate-D5W  0-3 mcg/kg/min Intravenous Continuous 17.7 mL/hr at 06/18/24 0400 0.06 mcg/kg/min at 06/18/24 0400       Lines/Drains/Airways       Central Venous Catheter Line  Duration             Trialysis (Dialysis) Catheter 06/12/24 0124 right internal jugular 6 days              Drain  Duration                  NG/OG Tube 06/17/24 1118  Right nostril <1 day              Line  Duration                  VAD 12/01/23 1015 Left ventricular assist device HeartMate 3 199 days              Peripheral Intravenous Line  Duration                  Midline Catheter - Single Lumen 06/13/24 1623 Left basilic vein (medial side of arm) 20g x 10cm 4 days                    Skin/Wounds  Bathing/Skin Care: back care;bath, complete;electrode patches/site rotation;dressed/undressed;linen changed (06/17/24 1701)  Wounds: No  Wound care consulted: No

## 2024-06-18 NOTE — CONSULTS
"  [facility] - Cardiac Intensive Care  Adult Nutrition  Consult Note    SUMMARY     Recommendations  1. TF's- Novasource Renal @ 40 ml/hr to provide 1920 kcals, 87 g of protein, and 688 ml of fluid   2. Continue Renal/coumadin restriction diet   3. RD following    Goals: Meet % of EEN/EPN by RD f/u date  Nutrition Goal Status: goal not met  Communication of RD Recs: POC    Reason for Assessment    Reason For Assessment: consult  Diagnosis: renal disease  Relevant Medical History: DM, CHF  Interdisciplinary Rounds: did not attend  General Information Comments: RD consulted for tube feeds. Pt receiving TF of Novasource Renal @ 40ml/hr during time of visit. Pt is currently on a renal/coumadin restriction diet. Recommendations provided below for best optimization of nutrition status at this time. RD following.  Nutrition Discharge Planning: pending clinical course    Nutrition Risk Screen    Nutrition Risk Screen: tube feeding or parenteral nutrition    Nutrition/Diet History    Spiritual, Cultural Beliefs, Yarsani Practices, Values that Affect Care: no  Food Allergies: NKFA    Anthropometrics    Temp: 98 °F (36.7 °C)  Height Method: Stated  Height: 6' 1" (185.4 cm)  Height (inches): 73 in  Weight Method: Bed Scale  Weight: 78.6 kg (173 lb 4.5 oz)  Weight (lb): 173.28 lb  Ideal Body Weight (IBW), Male: 184 lb  % Ideal Body Weight, Male (lb): 95.65 %  BMI (Calculated): 22.9  BMI Grade: 18.5-24.9 - normal       Lab/Procedures/Meds    Pertinent Labs Reviewed: reviewed  Pertinent Labs Comments: Sodium 128, Potassium 3.4, BUN 25, Cr 2.5, Glucose 251  Pertinent Medications Reviewed: reviewed  Pertinent Medications Comments: coumadin    Estimated/Assessed Needs    Weight Used For Calorie Calculations: 78.6 kg (173 lb 4.5 oz)  Energy Calorie Requirements (kcal): 1966  Energy Need Method: Kidder-St Jeor (PAL 1.2)  Protein Requirements:  g (1.2-1.5 g/kg)  Weight Used For Protein Calculations: 78.6 kg (173 lb 4.5 " oz)   RDA Method (mL): 1966         Nutrition Prescription Ordered    Current Diet Order: Renal, coumadin diet    Evaluation of Received Nutrient/Fluid Intake    I/O: -  Energy Calories Required: not meeting needs  Protein Required: not meeting needs  Fluid Required: not meeting needs  Total Fluid Intake (mL/kg): 1 ml or fluid per MD  Tolerance: tolerating  % Intake of Estimated Energy Needs: 75%  % Meal Intake: 0-25%    Nutrition Risk    Level of Risk/Frequency of Follow-up: low ((1-2x/week))     Monitor and Evaluation    Food and Nutrient Intake: energy intake, food and beverage intake  Food and Nutrient Adminstration: diet order  Knowledge/Beliefs/Attitudes: food and nutrition knowledge/skill, beliefs and attitudes  Physical Activity and Function: nutrition-related ADLs and IADLs, factors affecting access to physical activity  Anthropometric Measurements: height/length, weight, body mass index, weight change, growth pattern indices/percentile ranks  Biochemical Data, Medical Tests and Procedures: electrolyte and renal panel, gastrointestinal profile, glucose/endocrine profile, inflammatory profile, lipid profile  Nutrition-Focused Physical Findings: overall appearance, extremities, muscles and bones, head and eyes, skin       Nutrition Follow-Up    RD Follow-up?: Yes     0

## 2024-06-18 NOTE — PROGRESS NOTES
Bedside HD initiated, Good blood flows . /. Uf net  goal set for 2 liters  as tolerated.  B/P 90/D . Pulse 80 . Patient awake and alert

## 2024-06-18 NOTE — CARE UPDATE
Heart Transplant Care Update Note:    Hemodynamics@8pm:  CVP:10  SVO2: 65  CO: 9  CI: 4.5  SVR: 550        Continuous Infusions:   sodium chloride 0.9%   Intravenous Continuous   Stopped at 06/13/24 0117    dexmedeTOMIDine (Precedex) infusion (titrating)  0-1.4 mcg/kg/hr Intravenous Continuous   Stopped at 06/17/24 2222    dextrose 10 % in water (D10W)   Intravenous Continuous 25 mL/hr at 06/18/24 0000 Rate Verify at 06/18/24 0000    NORepinephrine bitartrate-D5W  0-3 mcg/kg/min Intravenous Continuous 5.9 mL/hr at 06/18/24 0000 0.02 mcg/kg/min at 06/18/24 0000       Intake/Output Summary (Last 24 hours) at 6/18/2024 0009  Last data filed at 6/18/2024 0000  Gross per 24 hour   Intake 1999.63 ml   Output 800 ml   Net 1199.63 ml           Plan:  No changes. Continue current management       Discussed with HTS Attending      Erin CONRAD MD  Cardiovascular Disease PGY IV  [facility]

## 2024-06-18 NOTE — SUBJECTIVE & OBJECTIVE
Interval History: Hypotensive overnight requiring Levophed (suspect sedation related to Precedex). More awake and oriented x2 today. Appears hypervolemic on exam though CVP is 10. Net +1.2L in last 24hrs. Did receive session of HD yesterday and got 1 pRBC with appropriate response in H&H. Will discuss with Nephro about volume removal today. WBC 10 and afebrile. Tolerating TF's. Will have speech therapy evaluate.       Continuous Infusions:   sodium chloride 0.9%   Intravenous Continuous   Stopped at 06/13/24 0117    dexmedeTOMIDine (Precedex) infusion (titrating)  0-1.4 mcg/kg/hr Intravenous Continuous   Stopped at 06/17/24 2222    dextrose 10 % in water (D10W)   Intravenous Continuous PRN        NORepinephrine bitartrate-D5W  0-3 mcg/kg/min Intravenous Continuous 11.8 mL/hr at 06/18/24 0600 0.04 mcg/kg/min at 06/18/24 0600     Scheduled Meds:   sodium chloride 0.9%   Intravenous Once    amiodarone  400 mg Oral Daily    atorvastatin  40 mg Oral QHS    [START ON 6/19/2024] epoetin zohaib (PROCRIT) injection  4,000 Units Intravenous Every Mon, Wed, Fri    folic acid  1 mg Oral Daily    LIDOcaine  2 patch Transdermal Q24H    pantoprazole  40 mg Oral Daily    piperacillin-tazobactam (Zosyn) IV (PEDS and ADULTS) (extended infusion is not appropriate)  4.5 g Intravenous Q8H    QUEtiapine  25 mg Oral QHS    venlafaxine  37.5 mg Oral Daily    vitamin D  1,000 Units Oral Daily    warfarin  2.5 mg Oral Daily     PRN Meds:  Current Facility-Administered Medications:     0.9%  NaCl infusion (for blood administration), , Intravenous, Q24H PRN    sodium chloride 0.9%, , Intravenous, PRN    acetaminophen, 650 mg, Oral, Q6H PRN    acetaminophen, 650 mg, Per NG tube, Q6H PRN    dextrose 10 % in water (D10W), , Intravenous, Continuous PRN    dextrose 10%, 12.5 g, Intravenous, PRN    dextrose 10%, 25 g, Intravenous, PRN    glucagon (human recombinant), 1 mg, Intramuscular, PRN    insulin aspart U-100, 0-5 Units, Subcutaneous, Q4H PRN     simethicone, 1 tablet, Oral, TID PRN    traZODone, 25 mg, Oral, Nightly PRN    Review of patient's allergies indicates:  No Known Allergies  Objective:     Vital Signs (Most Recent):  Temp: 97.9 °F (36.6 °C) (06/18/24 0701)  Pulse: 75 (06/18/24 1001)  Resp: 15 (06/18/24 1001)  BP: (!) 94/55 (06/18/24 0200)  SpO2: 96 % (06/18/24 1001) Vital Signs (24h Range):  Temp:  [97.7 °F (36.5 °C)-99.4 °F (37.4 °C)] 97.9 °F (36.6 °C)  Pulse:  [] 75  Resp:  [14-29] 15  SpO2:  [80 %-100 %] 96 %  BP: ()/(0-61) 94/55  Arterial Line BP: (84-88)/(0) 88/0     Patient Vitals for the past 72 hrs (Last 3 readings):   Weight   06/17/24 0501 78.6 kg (173 lb 4.5 oz)   06/16/24 0501 79.6 kg (175 lb 7.8 oz)   06/15/24 1127 79.8 kg (176 lb)     Body mass index is 22.86 kg/m².      Intake/Output Summary (Last 24 hours) at 6/18/2024 1056  Last data filed at 6/18/2024 0600  Gross per 24 hour   Intake 1965.55 ml   Output 800 ml   Net 1165.55 ml         Telemetry: SR       Physical Exam  Constitutional:       Appearance: Normal appearance.   HENT:      Head: Normocephalic and atraumatic.   Eyes:      Conjunctiva/sclera: Conjunctivae normal.      Pupils: Pupils are equal, round, and reactive to light.   Neck:      Comments: JVD visible   Cardiovascular:      Rate and Rhythm: Normal rate and regular rhythm.      Comments: Smooth VAD hum  Pulmonary:      Effort: Pulmonary effort is normal.      Breath sounds: Normal breath sounds.   Abdominal:      General: Bowel sounds are normal.      Palpations: Abdomen is soft.   Musculoskeletal:         General: No swelling. Normal range of motion.      Cervical back: Normal range of motion and neck supple.   Skin:     General: Skin is warm and dry.      Capillary Refill: Capillary refill takes 2 to 3 seconds.   Neurological:      General: No focal deficit present.      Mental Status: He is alert.      Comments: Oriented x2. Intermittent confusion/agitation             Significant Labs:  CBC:  Recent  Labs   Lab 06/16/24  0352 06/17/24  0319 06/18/24  0300   WBC 13.49* 12.21 10.67   RBC 2.38* 2.37* 2.77*   HGB 6.6* 6.5* 7.7*   HCT 20.7* 20.9* 24.0*    379 318   MCV 87 88 87   MCH 27.7 27.4 27.8   MCHC 31.9* 31.1* 32.1     BNP:  Recent Labs   Lab 06/11/24  1933 06/14/24 0221 06/17/24 0319   BNP 1,388* 1,542* 1,023*     CMP:  Recent Labs   Lab 06/14/24  0221 06/14/24  1313 06/15/24  0234 06/15/24  1401 06/15/24  2245 06/16/24  0352 06/16/24  1734 06/17/24 0319 06/18/24  0300   *  129*  129*   < > 77  77  77  77   < > 69* 54*  54* 77 96 251*   CALCIUM 9.0  9.0  9.0   < > 8.9  8.9  8.9  8.9   < > 8.9 8.7  8.7 8.6* 8.7 8.3*   ALBUMIN 2.5*  2.5*  2.5*   < > 2.5*  2.5*  2.5*  2.5*   < > 2.5* 2.5*  --  2.3*  --    PROT 7.6  --  7.8  --   --   --   --  7.0  --    *  129*  129*   < > 129*  129*  129*  129*   < > 129* 130*  130* 130* 131* 128*   K 4.7  4.7  4.7   < > 4.6  4.6  4.6  4.6   < > 4.3 4.0  4.0 3.5 3.7 3.4*   CO2 17*  17*  17*   < > 18*  18*  18*  18*   < > 22* 22*  22* 22* 22* 20*     100  100   < > 99  99  99  99   < > 96 96  96 95 96 97   BUN 30*  30*  30*   < > 11  11  11  11   < > 7* 6*  6* 13 22 25*   CREATININE 3.3*  3.3*  3.3*   < > 1.3  1.3  1.3  1.3   < > 0.8 0.8  0.8 1.6* 2.4* 2.5*   ALKPHOS 165*  --  253*  --   --   --   --  287*  --    ALT 65*  --  148*  --   --   --   --  285*  --    AST 64*  --  207*  --   --   --   --  325*  --    BILITOT 0.4  --  0.7  --   --   --   --  0.7  --     < > = values in this interval not displayed.      Coagulation:   Recent Labs   Lab 06/16/24  0352 06/17/24  0319 06/18/24  0300   INR 2.6* 1.8* 1.9*   APTT 43.9* 40.2* 40.3*     LDH:  Recent Labs   Lab 06/16/24  0352 06/17/24  0319 06/18/24  0300   * 468* 369*     Microbiology:  Microbiology Results (last 7 days)       Procedure Component Value Units Date/Time    Culture, Respiratory with Gram Stain [[id number]]     Order Status: No  result Specimen: Respiratory     Blood culture #1 **CANNOT BE ORDERED STAT** [[id number]] Collected: 06/11/24 2122    Order Status: Completed Specimen: Blood from Peripheral, Antecubital, Right Updated: 06/16/24 2212     Blood Culture, Routine No growth after 5 days.    Blood culture #2 **CANNOT BE ORDERED STAT** [[id number]] Collected: 06/11/24 2121    Order Status: Completed Specimen: Blood from Peripheral, Hand, Left Updated: 06/16/24 2212     Blood Culture, Routine No growth after 5 days.    Urine culture [[id number]] Collected: 06/12/24 1422    Order Status: Completed Specimen: Urine Updated: 06/13/24 2113     Urine Culture, Routine No growth    Narrative:      Specimen Source->Urine            I have reviewed all pertinent labs within the past 24 hours.    Estimated Creatinine Clearance: 34.1 mL/min (A) (based on SCr of 2.5 mg/dL (H)).    Diagnostic Results:  I have reviewed and interpreted all pertinent imaging results/findings within the past 24 hours.

## 2024-06-18 NOTE — PLAN OF CARE
POC reviewed with Radha Abbott and family. Questions and concerns addressed. CVP: 6, 5  and 6.  Svo2: 41%. HD done today. VAD flow dropped up to 3.2 during the dialysis, MD informed, flow back up to 4.4  after HD. See below and flowsheets for full assessment and VS info.       Neuro:  [place] Coma Scale  Best Eye Response: 3-->(E3) to speech  Best Motor Response: 5-->(M5) localizes pain  Best Verbal Response: 3-->(V3) inappropriate words  [place] Coma Scale Score: 11  Assessment Qualifiers: patient not sedated/intubated  Pupil PERRLA: yes    24 hr Temp:  [97.7 °F (36.5 °C)-99.4 °F (37.4 °C)]     CV:  Rhythm: normal sinus rhythm   DVT prophylaxis: VTE Required Core Measure: Pharmacological prophylaxis initiated/maintained    CVP (mean): 6 mmHg (06/17/24 1715)       SVO2 (%): (S) 63 % (06/16/24 1901)       Type: HeartMate3       Pulses  Right Radial Pulse: 1+ (weak)  Left Radial Pulse: 1+ (weak)  Right Dorsalis Pedis Pulse: Doppler  Left Dorsalis Pedis Pulse: Doppler  Right Posterior Tibial Pulse: Doppler  Left Posterior Tibial Pulse: Doppler    Resp:  Flow (L/min) (Oxygen Therapy): 3  Oxygen Concentration (%): 32    GI/:  GI prophylaxis: no  Diet/Nutrition Received: no added salt, restrict fluids  Last Bowel Movement: 06/16/24  Voiding Characteristics: anuria, patient on CRRT   Intake/Output Summary (Last 24 hours) at 6/17/2024 1926  Last data filed at 6/17/2024 1850  Gross per 24 hour   Intake 2093.28 ml   Output 800 ml   Net 1293.28 ml     Treatment Type: Slow low efficiency dialysis  UF Rate: 500 cc/hr    Labs/Accuchecks:  Recent Labs   Lab 06/15/24  0234 06/15/24  0810 06/15/24  1247 06/16/24  0352 06/17/24  0319   WBC 11.96  --   --  13.49* 12.21   RBC 2.21*  --   --  2.38* 2.37*   HGB 6.3*  --   --  6.6* 6.5*   HCT 19.5*   < > 22* 20.7* 20.9*     --   --  377 379    < > = values in this interval not displayed.      Recent Labs   Lab 06/15/24  0234 06/16/24  0352 06/17/24  0319   INR 3.4* 2.6* 1.8*    APTT 50.4* 43.9* 40.2*      Recent Labs     06/17/24  0319   *   K 3.7   CO2 22*   CL 96   BUN 22   CREATININE 2.4*   ALKPHOS 287*   *   *   BILITOT 0.7       Recent Labs   Lab 06/11/24  1933   TROPONINI 0.051*      Recent Labs     06/15/24  1912 06/15/24  1917 06/16/24  0801 06/16/24  1612 06/16/24 1959 06/17/24  0736   PH 7.494* 7.426  --   --  7.440  --    PCO2 30.7* 38.0  --   --  36.7  --    PO2 79* 27*   < > 24* 31* 24*   HCO3 23.6* 25.0  --   --  25.0  --    POCSATURATED 97 53   < > 46 63 41   BE 0 1  --   --  1  --     < > = values in this interval not displayed.       Electrolytes: Electrolytes replaced  Accuchecks: Q4H    Gtts/LDAs:   sodium chloride 0.9%   Intravenous Continuous   Stopped at 06/13/24 0117    dexmedeTOMIDine (Precedex) infusion (titrating)  0-1.4 mcg/kg/hr Intravenous Continuous 11.94 mL/hr at 06/17/24 1850 0.6 mcg/kg/hr at 06/17/24 1850    dextrose 10 % in water (D10W)   Intravenous Continuous 25 mL/hr at 06/17/24 1850 Rate Verify at 06/17/24 1850       Lines/Drains/Airways       Central Venous Catheter Line  Duration             Trialysis (Dialysis) Catheter 06/12/24 0124 right internal jugular 5 days              Drain  Duration                  NG/OG Tube 06/17/24 1118  Right nostril <1 day              Line  Duration                  VAD 12/01/23 1015 Left ventricular assist device HeartMate 3 199 days              Peripheral Intravenous Line  Duration                  Midline Catheter - Single Lumen 06/13/24 1623 Left basilic vein (medial side of arm) 20g x 10cm 4 days                    Skin/Wounds  Bathing/Skin Care: bath, complete;linen changed;electrode patches/site rotation;dressed/undressed (06/16/24 1601)  Wounds: No  Wound care consulted: No

## 2024-06-18 NOTE — SUBJECTIVE & OBJECTIVE
Interval History: HD yesterday, tolerated well. Net +1.2L. Off pressors this am.     Review of patient's allergies indicates:  No Known Allergies  Current Facility-Administered Medications   Medication Frequency    0.9%  NaCl infusion (for blood administration) Q24H PRN    0.9%  NaCl infusion Continuous    0.9%  NaCl infusion PRN    0.9%  NaCl infusion Once    acetaminophen tablet 650 mg Q6H PRN    acetaminophen tablet 650 mg Q6H PRN    amiodarone tablet 400 mg Daily    atorvastatin tablet 40 mg QHS    dexmedetomidine (PRECEDEX) 400mcg/100mL 0.9% NaCL infusion Continuous    dextrose 10 % infusion Continuous PRN    dextrose 10% bolus 125 mL 125 mL PRN    dextrose 10% bolus 250 mL 250 mL PRN    [START ON 6/19/2024] epoetin zohaib injection 4,000 Units Every Mon, Wed, Fri    folic acid tablet 1 mg Daily    glucagon (human recombinant) injection 1 mg PRN    insulin aspart U-100 pen 0-5 Units Q4H PRN    LIDOcaine 5 % patch 2 patch Q24H    NORepinephrine 4 mg in dextrose 5% 250 mL infusion (premix) Continuous    pantoprazole EC tablet 40 mg Daily    piperacillin-tazobactam (ZOSYN) 4.5 g in dextrose 5 % in water (D5W) 100 mL IVPB (MB+) Q8H    QUEtiapine tablet 25 mg QHS    simethicone chewable tablet 80 mg TID PRN    trazodone split tablet 25 mg Nightly PRN    venlafaxine tablet 37.5 mg Daily    vitamin D 1000 units tablet 1,000 Units Daily    warfarin (COUMADIN) tablet 2.5 mg Daily       Objective:     Vital Signs (Most Recent):  Temp: 98 °F (36.7 °C) (06/18/24 1101)  Pulse: 75 (06/18/24 1201)  Resp: 17 (06/18/24 1201)  BP: (!) 94/55 (06/18/24 0200)  SpO2: 98 % (06/18/24 1201) Vital Signs (24h Range):  Temp:  [97.7 °F (36.5 °C)-98 °F (36.7 °C)] 98 °F (36.7 °C)  Pulse:  [] 75  Resp:  [14-29] 17  SpO2:  [80 %-100 %] 98 %  BP: ()/(0-60) 94/55  Arterial Line BP: (84-88)/(0) 88/0     Weight: 78.6 kg (173 lb 4.5 oz) (06/17/24 9691)  Body mass index is 22.86 kg/m².  Body surface area is 2.01 meters squared.    I/O last  3 completed shifts:  In: 2722.5 [I.V.:1256.4; Blood:397.9; Other:350; NG/GT:50; IV Piggyback:668.2]  Out: 800 [Other:800]     Physical Exam  Vitals and nursing note reviewed.   Constitutional:       Appearance: Normal appearance.   HENT:      Head: Normocephalic and atraumatic.   Eyes:      Conjunctiva/sclera: Conjunctivae normal.      Pupils: Pupils are equal, round, and reactive to light.   Neck:      Comments: RIJ trialysis cath.   Cardiovascular:      Rate and Rhythm: Normal rate and regular rhythm.      Comments: Smooth VAD hum  Pulmonary:      Effort: Pulmonary effort is normal.      Breath sounds: Normal breath sounds.   Abdominal:      General: Bowel sounds are normal.      Palpations: Abdomen is soft.   Musculoskeletal:         General: No swelling. Normal range of motion.      Cervical back: Normal range of motion and neck supple.   Skin:     General: Skin is warm and dry.      Capillary Refill: Capillary refill takes 2 to 3 seconds.   Neurological:      General: No focal deficit present.      Mental Status: He is alert and oriented to person, place, and time.          Significant Labs:  CBC:   Recent Labs   Lab 06/18/24  0300   WBC 10.67   RBC 2.77*   HGB 7.7*   HCT 24.0*      MCV 87   MCH 27.8   MCHC 32.1     CMP:   Recent Labs   Lab 06/17/24  0319 06/18/24  0300   GLU 96 251*   CALCIUM 8.7 8.3*   ALBUMIN 2.3*  --    PROT 7.0  --    * 128*   K 3.7 3.4*   CO2 22* 20*   CL 96 97   BUN 22 25*   CREATININE 2.4* 2.5*   ALKPHOS 287*  --    *  --    *  --    BILITOT 0.7  --      All labs within the past 24 hours have been reviewed.

## 2024-06-18 NOTE — ASSESSMENT & PLAN NOTE
Mr. 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 Admitted for worsening renal function with a BUN>100/Cr. 9.9.     - CT Ab/pel: Kidneys/ Ureters: Normal in size and location.  Mild bilateral perinephric stranding, a nonspecific finding and similar to prior.  No hydronephrosis or nephrolithiasis. No ureteral dilatation.  - Nephrology consulted, appreciate recs.   -Tolerated HD trial yesterday   - Will need OP HD upon discharge, recommend CM/SW involvement to start workup.  - Will need IR evaluation once out of ICU for TDC placement.  - Renally dosing medications

## 2024-06-18 NOTE — PROGRESS NOTES
06/18/2024  Deni Frye    Current provider:  Sajan Hurley, *    Device interrogation:      6/18/2024     3:00 AM 6/18/2024     2:00 AM 6/18/2024     1:00 AM 6/18/2024    12:00 AM 6/17/2024    11:00 PM 6/17/2024    10:00 PM 6/17/2024     9:00 PM   TXP LVAD INTERROGATIONS   Type HeartMate3 HeartMate3 HeartMate3 HeartMate3 HeartMate3 HeartMate3 HeartMate3   Flow 4.5 4.4 4.6 4.7 4.1 3.7 4.2   Speed 5000 5000 5000 5000 5000 5000 5000   PI 3.1 3.4 3.4 2.7 2 5.6 2.6   Power ([place]) 3.4 3.4 3.4 3.5 3.3 3.3 3.3   LSL 4600 4600 4600 4600 4600 4600 4600   Pulsatility Intermittent pulse Intermittent pulse Intermittent pulse Intermittent pulse Intermittent pulse Intermittent pulse Intermittent pulse          Rounded on [facility] to ensure all mechanical assist device settings (IABP or VAD) were appropriate and all parameters were within limits.  I was able to ensure all back up equipment was present, the staff had no issues, and the Perfusion Department daily rounding was complete.      For implantable VADs: Interrogation of Ventricular assist device was performed with analysis of device parameters and review of device function. I have personally reviewed the interrogation findings and agree with findings as stated.     In emergency, the nursing units have been notified to contact the perfusion department either by:  Calling [phone] from 630am to 4pm Mon thru Fri, utilizing the On-Call Finder functionality of Epic and searching for Perfusion, or by contacting the hospital  from 4pm to 630am and on weekends and asking to speak with the perfusionist on call.    3:31 AM

## 2024-06-18 NOTE — PLAN OF CARE
Recommendations  1. TF's- Novasource Renal @ 40 ml/hr to provide 1920 kcals, 87 g of protein, and 688 ml of fluid   2. Continue Renal/coumadin restriction diet   3. RD following     Goals: Meet % of EEN/EPN by RD f/u date  Nutrition Goal Status: goal not met  Communication of RD Recs: POC

## 2024-06-18 NOTE — SUBJECTIVE & OBJECTIVE
"Interval HPI:   No acute events overnight. Patient in room [id number] A. Blood glucose stable. BG at , above and below goal on current insulin regimen (None). Steroid use- None .   Renal function- Abnormal -    Vasopressors-  None      Diet Renal Coumadin Restriction; Fluid - 2000mL      Eating:   <25%  Nausea: No  Hypoglycemia and intervention: No  Fever: No  TPN and/or TF: Started on TF yesterday       BP (!) 94/55   Pulse 88   Temp 97.9 °F (36.6 °C) (Oral)   Resp 15   Ht 6' 1" (1.854 m)   Wt 78.6 kg (173 lb 4.5 oz)   SpO2 97%   BMI 22.86 kg/m²     Labs Reviewed and Include    Recent Labs   Lab 06/18/24  0300   *   CALCIUM 8.3*   *   K 3.4*   CO2 20*   CL 97   BUN 25*   CREATININE 2.5*     Lab Results   Component Value Date    WBC 10.67 06/18/2024    HGB 7.7 (L) 06/18/2024    HCT 24.0 (L) 06/18/2024    MCV 87 06/18/2024     06/18/2024     No results for input(s): "TSH", "FREET4" in the last 168 hours.  Lab Results   Component Value Date    HGBA1C 7.4 (H) 04/17/2024       Nutritional status:   Body mass index is 22.86 kg/m².  Lab Results   Component Value Date    ALBUMIN 2.3 (L) 06/17/2024    ALBUMIN 2.5 (L) 06/16/2024    ALBUMIN 2.5 (L) 06/15/2024     Lab Results   Component Value Date    PREALBUMIN 15 (L) 06/17/2024    PREALBUMIN 22 06/14/2024    PREALBUMIN 25 06/12/2024       Estimated Creatinine Clearance: 34.1 mL/min (A) (based on SCr of 2.5 mg/dL (H)).    Accu-Checks  Recent Labs     06/16/24  1609 06/16/24  1734 06/16/24 1957 06/17/24  0755 06/17/24  1257 06/17/24  1606 06/17/24 1957 06/18/24  0103 06/18/24  0300 06/18/24  0713   POCTGLUCOSE 78 82 98 109 137* 117* 178* 255* 246* 211*       Current Medications and/or Treatments Impacting Glycemic Control  Immunotherapy:    Immunosuppressants       None          Steroids:   Hormones (From admission, onward)      None          Pressors:    Autonomic Drugs (From admission, onward)      Start     Stop Route Frequency Ordered    " 06/17/24 2245  NORepinephrine 4 mg in dextrose 5% 250 mL infusion (premix)        Question Answer Comment   Begin at (in mcg/kg/min): 0.02    Titrate by: (in mcg/kg/min (RANGE PREFERRED) 0.02 - 0.2    Titrate interval: (in minutes) (RANGE PREFERRED) 2 - 5    Titrate to maintain: (MAP or SBP) MAP    Titrate to keep MAP within the range or GREATER than (if single number): (in mmHg) OTHER    Other >60    Maximum dose: (in mcg/kg/min) 3        -- IV Continuous 06/17/24 0897          Hyperglycemia/Diabetes Medications:   Antihyperglycemics (From admission, onward)      Start     Stop Route Frequency Ordered    06/18/24 1011  insulin aspart U-100 pen 0-5 Units         -- SubQ Every 4 hours PRN 06/18/24 0969

## 2024-06-19 PROBLEM — E16.2 HYPOGLYCEMIA: Status: RESOLVED | Noted: 2024-06-12 | Resolved: 2024-06-19

## 2024-06-19 LAB
ALBUMIN SERPL BCP-MCNC: 2.1 G/DL (ref 3.5–5.2)
ALLENS TEST: ABNORMAL
ALLENS TEST: ABNORMAL
ALP SERPL-CCNC: 369 U/L (ref 55–135)
ALT SERPL W/O P-5'-P-CCNC: 159 U/L (ref 10–44)
ANION GAP SERPL CALC-SCNC: 11 MMOL/L (ref 8–16)
APTT PPP: 40 SEC (ref 21–32)
AST SERPL-CCNC: 110 U/L (ref 10–40)
BASOPHILS # BLD AUTO: 0.02 K/UL (ref 0–0.2)
BASOPHILS NFR BLD: 0.2 % (ref 0–1.9)
BILIRUB DIRECT SERPL-MCNC: 0.4 MG/DL (ref 0.1–0.3)
BILIRUB SERPL-MCNC: 0.5 MG/DL (ref 0.1–1)
BNP SERPL-MCNC: 823 PG/ML (ref 0–99)
BUN SERPL-MCNC: 32 MG/DL (ref 8–23)
CALCIUM SERPL-MCNC: 8.3 MG/DL (ref 8.7–10.5)
CHLORIDE SERPL-SCNC: 102 MMOL/L (ref 95–110)
CO2 SERPL-SCNC: 18 MMOL/L (ref 23–29)
CREAT SERPL-MCNC: 2.7 MG/DL (ref 0.5–1.4)
CRP SERPL-MCNC: 159.8 MG/L (ref 0–8.2)
DELSYS: ABNORMAL
DELSYS: ABNORMAL
DIFFERENTIAL METHOD BLD: ABNORMAL
EOSINOPHIL # BLD AUTO: 0.2 K/UL (ref 0–0.5)
EOSINOPHIL NFR BLD: 2.8 % (ref 0–8)
ERYTHROCYTE [DISTWIDTH] IN BLOOD BY AUTOMATED COUNT: 17.3 % (ref 11.5–14.5)
ERYTHROCYTE [SEDIMENTATION RATE] IN BLOOD BY WESTERGREN METHOD: 13 MM/H
EST. GFR  (NO RACE VARIABLE): 25.8 ML/MIN/1.73 M^2
FIO2: 21
GLUCOSE SERPL-MCNC: 126 MG/DL (ref 70–110)
HCO3 UR-SCNC: 19.6 MMOL/L (ref 24–28)
HCT VFR BLD AUTO: 20.9 % (ref 40–54)
HGB BLD-MCNC: 6.8 G/DL (ref 14–18)
IMM GRANULOCYTES # BLD AUTO: 0.09 K/UL (ref 0–0.04)
IMM GRANULOCYTES NFR BLD AUTO: 1 % (ref 0–0.5)
INR PPP: 2.2 (ref 0.8–1.2)
LDH SERPL L TO P-CCNC: 402 U/L (ref 110–260)
LYMPHOCYTES # BLD AUTO: 0.4 K/UL (ref 1–4.8)
LYMPHOCYTES NFR BLD: 4.3 % (ref 18–48)
MAGNESIUM SERPL-MCNC: 2.2 MG/DL (ref 1.6–2.6)
MCH RBC QN AUTO: 28.1 PG (ref 27–31)
MCHC RBC AUTO-ENTMCNC: 32.5 G/DL (ref 32–36)
MCV RBC AUTO: 86 FL (ref 82–98)
MODE: ABNORMAL
MONOCYTES # BLD AUTO: 0.8 K/UL (ref 0.3–1)
MONOCYTES NFR BLD: 9 % (ref 4–15)
NEUTROPHILS # BLD AUTO: 7.2 K/UL (ref 1.8–7.7)
NEUTROPHILS NFR BLD: 82.7 % (ref 38–73)
NRBC BLD-RTO: 0 /100 WBC
PCO2 BLDA: 36.9 MMHG (ref 35–45)
PH SMN: 7.33 [PH] (ref 7.35–7.45)
PHOSPHATE SERPL-MCNC: 1.6 MG/DL (ref 2.7–4.5)
PLATELET # BLD AUTO: 334 K/UL (ref 150–450)
PMV BLD AUTO: 9.2 FL (ref 9.2–12.9)
PO2 BLDA: 28 MMHG (ref 40–60)
PO2 BLDA: 29 MMHG (ref 40–60)
POC BE: -6 MMOL/L
POC SATURATED O2: 48 % (ref 95–100)
POC SATURATED O2: 52 % (ref 95–100)
POC TCO2: 21 MMOL/L (ref 24–29)
POCT GLUCOSE: 127 MG/DL (ref 70–110)
POCT GLUCOSE: 166 MG/DL (ref 70–110)
POCT GLUCOSE: 185 MG/DL (ref 70–110)
POCT GLUCOSE: 186 MG/DL (ref 70–110)
POCT GLUCOSE: 229 MG/DL (ref 70–110)
POCT GLUCOSE: 269 MG/DL (ref 70–110)
POTASSIUM SERPL-SCNC: 3.7 MMOL/L (ref 3.5–5.1)
PREALB SERPL-MCNC: 14 MG/DL (ref 20–43)
PROT SERPL-MCNC: 7.2 G/DL (ref 6–8.4)
PROTHROMBIN TIME: 23.1 SEC (ref 9–12.5)
RBC # BLD AUTO: 2.42 M/UL (ref 4.6–6.2)
SAMPLE: ABNORMAL
SAMPLE: ABNORMAL
SITE: ABNORMAL
SITE: ABNORMAL
SODIUM SERPL-SCNC: 131 MMOL/L (ref 136–145)
WBC # BLD AUTO: 8.66 K/UL (ref 3.9–12.7)

## 2024-06-19 PROCEDURE — 63600175 PHARM REV CODE 636 W HCPCS: Mod: JZ,JG | Performed by: REGISTERED NURSE

## 2024-06-19 PROCEDURE — 25000003 PHARM REV CODE 250: Performed by: REGISTERED NURSE

## 2024-06-19 PROCEDURE — 63600175 PHARM REV CODE 636 W HCPCS: Performed by: INTERNAL MEDICINE

## 2024-06-19 PROCEDURE — 80076 HEPATIC FUNCTION PANEL: CPT | Performed by: STUDENT IN AN ORGANIZED HEALTH CARE EDUCATION/TRAINING PROGRAM

## 2024-06-19 PROCEDURE — 86140 C-REACTIVE PROTEIN: CPT | Performed by: STUDENT IN AN ORGANIZED HEALTH CARE EDUCATION/TRAINING PROGRAM

## 2024-06-19 PROCEDURE — 85025 COMPLETE CBC W/AUTO DIFF WBC: CPT | Performed by: STUDENT IN AN ORGANIZED HEALTH CARE EDUCATION/TRAINING PROGRAM

## 2024-06-19 PROCEDURE — 94761 N-INVAS EAR/PLS OXIMETRY MLT: CPT

## 2024-06-19 PROCEDURE — 93750 INTERROGATION VAD IN PERSON: CPT | Mod: ,,, | Performed by: INTERNAL MEDICINE

## 2024-06-19 PROCEDURE — 21400001 HC TELEMETRY ROOM

## 2024-06-19 PROCEDURE — 97535 SELF CARE MNGMENT TRAINING: CPT

## 2024-06-19 PROCEDURE — 27000248 HC VAD-ADDITIONAL DAY

## 2024-06-19 PROCEDURE — 83615 LACTATE (LD) (LDH) ENZYME: CPT | Performed by: STUDENT IN AN ORGANIZED HEALTH CARE EDUCATION/TRAINING PROGRAM

## 2024-06-19 PROCEDURE — 97110 THERAPEUTIC EXERCISES: CPT

## 2024-06-19 PROCEDURE — 97530 THERAPEUTIC ACTIVITIES: CPT

## 2024-06-19 PROCEDURE — 97112 NEUROMUSCULAR REEDUCATION: CPT

## 2024-06-19 PROCEDURE — 80048 BASIC METABOLIC PNL TOTAL CA: CPT | Performed by: STUDENT IN AN ORGANIZED HEALTH CARE EDUCATION/TRAINING PROGRAM

## 2024-06-19 PROCEDURE — 83880 ASSAY OF NATRIURETIC PEPTIDE: CPT | Performed by: STUDENT IN AN ORGANIZED HEALTH CARE EDUCATION/TRAINING PROGRAM

## 2024-06-19 PROCEDURE — 82803 BLOOD GASES ANY COMBINATION: CPT

## 2024-06-19 PROCEDURE — 25000003 PHARM REV CODE 250: Performed by: STUDENT IN AN ORGANIZED HEALTH CARE EDUCATION/TRAINING PROGRAM

## 2024-06-19 PROCEDURE — 25000003 PHARM REV CODE 250: Performed by: NURSE PRACTITIONER

## 2024-06-19 PROCEDURE — 99233 SBSQ HOSP IP/OBS HIGH 50: CPT | Mod: ,,, | Performed by: HOSPITALIST

## 2024-06-19 PROCEDURE — 99292 CRITICAL CARE ADDL 30 MIN: CPT | Mod: ,,, | Performed by: INTERNAL MEDICINE

## 2024-06-19 PROCEDURE — 99900035 HC TECH TIME PER 15 MIN (STAT)

## 2024-06-19 PROCEDURE — 99232 SBSQ HOSP IP/OBS MODERATE 35: CPT | Mod: ,,, | Performed by: PHYSICIAN ASSISTANT

## 2024-06-19 PROCEDURE — 84134 ASSAY OF PREALBUMIN: CPT | Performed by: STUDENT IN AN ORGANIZED HEALTH CARE EDUCATION/TRAINING PROGRAM

## 2024-06-19 PROCEDURE — 84100 ASSAY OF PHOSPHORUS: CPT | Performed by: STUDENT IN AN ORGANIZED HEALTH CARE EDUCATION/TRAINING PROGRAM

## 2024-06-19 PROCEDURE — 83735 ASSAY OF MAGNESIUM: CPT | Performed by: STUDENT IN AN ORGANIZED HEALTH CARE EDUCATION/TRAINING PROGRAM

## 2024-06-19 PROCEDURE — 63600175 PHARM REV CODE 636 W HCPCS: Mod: JZ | Performed by: NURSE PRACTITIONER

## 2024-06-19 PROCEDURE — 92526 ORAL FUNCTION THERAPY: CPT

## 2024-06-19 PROCEDURE — 85730 THROMBOPLASTIN TIME PARTIAL: CPT | Performed by: STUDENT IN AN ORGANIZED HEALTH CARE EDUCATION/TRAINING PROGRAM

## 2024-06-19 PROCEDURE — 99291 CRITICAL CARE FIRST HOUR: CPT | Mod: ,,, | Performed by: REGISTERED NURSE

## 2024-06-19 PROCEDURE — 25000003 PHARM REV CODE 250: Performed by: INTERNAL MEDICINE

## 2024-06-19 PROCEDURE — 85610 PROTHROMBIN TIME: CPT | Performed by: STUDENT IN AN ORGANIZED HEALTH CARE EDUCATION/TRAINING PROGRAM

## 2024-06-19 RX ORDER — QUETIAPINE FUMARATE 25 MG/1
50 TABLET, FILM COATED ORAL NIGHTLY
Status: DISCONTINUED | OUTPATIENT
Start: 2024-06-19 | End: 2024-06-21

## 2024-06-19 RX ORDER — MIDODRINE HYDROCHLORIDE 5 MG/1
10 TABLET ORAL EVERY 8 HOURS
Status: DISCONTINUED | OUTPATIENT
Start: 2024-06-19 | End: 2024-07-01 | Stop reason: HOSPADM

## 2024-06-19 RX ORDER — GABAPENTIN 100 MG/1
100 CAPSULE ORAL 2 TIMES DAILY
Status: DISCONTINUED | OUTPATIENT
Start: 2024-06-19 | End: 2024-07-01 | Stop reason: HOSPADM

## 2024-06-19 RX ADMIN — VENLAFAXINE 37.5 MG: 37.5 TABLET ORAL at 08:06

## 2024-06-19 RX ADMIN — FERUMOXYTOL 510 MG: 510 INJECTION INTRAVENOUS at 02:06

## 2024-06-19 RX ADMIN — WARFARIN SODIUM 2.5 MG: 2.5 TABLET ORAL at 06:06

## 2024-06-19 RX ADMIN — NOREPINEPHRINE BITARTRATE 0.04 MCG/KG/MIN: 4 INJECTION, SOLUTION INTRAVENOUS at 12:06

## 2024-06-19 RX ADMIN — INSULIN ASPART 3 UNITS: 100 INJECTION, SOLUTION INTRAVENOUS; SUBCUTANEOUS at 09:06

## 2024-06-19 RX ADMIN — PIPERACILLIN SODIUM AND TAZOBACTAM SODIUM 4.5 G: 4; .5 INJECTION, POWDER, FOR SOLUTION INTRAVENOUS at 01:06

## 2024-06-19 RX ADMIN — INSULIN ASPART 2 UNITS: 100 INJECTION, SOLUTION INTRAVENOUS; SUBCUTANEOUS at 01:06

## 2024-06-19 RX ADMIN — FOLIC ACID 1 MG: 1 TABLET ORAL at 08:06

## 2024-06-19 RX ADMIN — MIDODRINE HYDROCHLORIDE 10 MG: 5 TABLET ORAL at 03:06

## 2024-06-19 RX ADMIN — GABAPENTIN 100 MG: 100 CAPSULE ORAL at 03:06

## 2024-06-19 RX ADMIN — PANTOPRAZOLE SODIUM 40 MG: 40 TABLET, DELAYED RELEASE ORAL at 08:06

## 2024-06-19 RX ADMIN — QUETIAPINE FUMARATE 50 MG: 25 TABLET ORAL at 09:06

## 2024-06-19 RX ADMIN — ATORVASTATIN CALCIUM 40 MG: 40 TABLET, FILM COATED ORAL at 09:06

## 2024-06-19 RX ADMIN — CHOLECALCIFEROL TAB 25 MCG (1000 UNIT) 1000 UNITS: 25 TAB at 08:06

## 2024-06-19 RX ADMIN — AMIODARONE HYDROCHLORIDE 400 MG: 200 TABLET ORAL at 08:06

## 2024-06-19 RX ADMIN — MIDODRINE HYDROCHLORIDE 10 MG: 5 TABLET ORAL at 09:06

## 2024-06-19 RX ADMIN — GABAPENTIN 100 MG: 100 CAPSULE ORAL at 09:06

## 2024-06-19 RX ADMIN — NOREPINEPHRINE BITARTRATE 0.02 MCG/KG/MIN: 4 INJECTION, SOLUTION INTRAVENOUS at 07:06

## 2024-06-19 RX ADMIN — PIPERACILLIN SODIUM AND TAZOBACTAM SODIUM 4.5 G: 4; .5 INJECTION, POWDER, FOR SOLUTION INTRAVENOUS at 03:06

## 2024-06-19 RX ADMIN — ERYTHROPOIETIN 4000 UNITS: 4000 INJECTION, SOLUTION INTRAVENOUS; SUBCUTANEOUS at 08:06

## 2024-06-19 NOTE — ASSESSMENT & PLAN NOTE
Patient with a baseline eGFR that appears to be variable however was noted to be in the 15-20 range in February before his recent IVÁN in May, presents now with fatigue and worsening IVÁN with a creatinine of 9.8 and eGFR of 5.5. BUN was noted to be elevated at 117, other electrolytes were normal. He was noted to have some asterixis on exam (which he states is chronic for him and unchanged from his baseline), however denies any other signs or symptoms of uremia. Bedside bladder scan revealed 200 mL of urine in his bladder and his last bladder void was 10 hours prior to this consult.     SLED initiated 6/13 x4 hours.  Mental status improved.  6/14:  8 hour SLED overnight, net even.    Recommendations:     -Plan for HD tomorrow 6/20  -Avoid nephrotoxic agents (IV contrast, NSAID's, gadolinium contrast, PPI's) if able.   -Strict I's and O's  -Renally dose all medications  -will need OP HD upon discharge, recommend CM/SW involvement to start workup.  -Please consult IR for TDC rand

## 2024-06-19 NOTE — PT/OT/SLP PROGRESS
Physical Therapy Co-Treatment    Patient Name:  Radha Abbott   MRN:  [id number]    Recommendations:     Discharge Recommendations: Moderate Intensity Therapy  Discharge Equipment Recommendations: hospital bed, wheelchair  Barriers to discharge:  inc level of assist required; ongoing medical care    Assessment:     Radha Abbott is a 64 y.o. male admitted with a medical diagnosis of Acute renal failure superimposed on stage 4 chronic kidney disease.  He presents with the following impairments/functional limitations: weakness, impaired endurance, impaired self care skills, impaired functional mobility, gait instability, impaired balance, impaired cognition, decreased coordination, decreased upper extremity function, decreased lower extremity function, decreased safety awareness, impaired cardiopulmonary response to activity. Pt w/ improved command following and participation today, able to sit EOB w/ Yonis-CGA but does demonstrate dec strength in BLE grossly 2/5 due to lack of mobilization since Friday 2/2 AMS. Patient continues to demonstrate the need for moderate intensity therapy on a daily basis post acute exhibited by decreased independence with self-care and functional mobility.     Rehab Prognosis: Fair; patient would benefit from acute skilled PT services to address these deficits and reach maximum level of function.    Recent Surgery: * No surgery found *      Plan:     During this hospitalization, patient to be seen 4 x/week to address the identified rehab impairments via gait training, therapeutic activities, therapeutic exercises, neuromuscular re-education and progress toward the following goals:    Plan of Care Expires:  07/14/24    Subjective     Chief Complaint: intermittent shocking pain in B feet  Patient/Family Comments/goals: pt reports fatigue; wife happy to see him doing better and talking more  Pain/Comfort:  Pain Rating 1: 0/10  Pain Rating Post-Intervention 1: 0/10      Objective:     Communicated  with RN prior to session.  Patient found HOB elevated with arterial line, blood pressure cuff, LVAD, peripheral IV, telemetry, pulse ox (continuous), Trialysis, NG tube upon PT entry to room. Co-tx w/ OT 2/2 suspected pt complexity and requirement of 2 skilled therapists to assist in order to maximize pt treatment     General Precautions: Standard, fall, LVAD  Orthopedic Precautions: N/A  Braces: N/A  Respiratory Status: Room air     Functional Mobility:  Bed Mobility:     Scooting: total assistance and of 2 persons  Supine to Sit: moderate assistance  Sit to Supine: maximal assistance and of 2 persons  Balance: EOB sitting balance CGA-Yonis for 25min      AM-PAC 6 CLICK MOBILITY  Turning over in bed (including adjusting bedclothes, sheets and blankets)?: 2  Sitting down on and standing up from a chair with arms (e.g., wheelchair, bedside commode, etc.): 1  Moving from lying on back to sitting on the side of the bed?: 2  Moving to and from a bed to a chair (including a wheelchair)?: 1  Need to walk in hospital room?: 1  Climbing 3-5 steps with a railing?: 1  Basic Mobility Total Score: 8       Treatment & Education:  Pt educated on PT POC/goals, d/c recs, and continued treatment. All questions answered and pt in agreement w/ POC.  Pt educated on therex to perform 3x/day independently in order to maintain/progress mobility and strength and allow treatment time to focus on functional mobility. Pt demonstrated independence with all exercises and verbalized understanding: ankle pumps, LAQs, marching. Performed AAROM at EOB along w/ stretching to B gastroc-soleus and hamstrings  WB through BLE w/ weight shift forward to press down into floor also providing stretch to calves  Sitting balance w/ tactile and verbal cueing for upright posture, midline orientation, attention to task, and safety awareness; stretching into shoulder retraction and depression [id number]  Rolling L/R in bed to reposition pt w/ wedges and pillows to  offload pressure, avoid muscle contractures, and prevent skin breakdown. Pt and family educated on importance of pt being rotated every 2hrs for these reasons.     Patient left with bed in chair position with all lines intact, call button in reach, RN notified, and MD present..    GOALS:   Multidisciplinary Problems       Physical Therapy Goals          Problem: Physical Therapy    Goal Priority Disciplines Outcome Goal Variances Interventions   Physical Therapy Goal     PT, PT/OT Progressing     Description: Goals to be completed by: 7/14/24    Pt will perform sup<>sit transfers w/ contact guard assistance  Pt will have sufficient dynamic balance to sit EOB while performing ADLs/therex w/ supervision  Pt will be able to stand up from EOB w/ contact guard assistance using LRAD  Pt will ambulate 100 feet w/ minimum assistance using LRAD  Pt will be independent w/ HEP therex on BLE w/ good form and ROM                        Time Tracking:     PT Received On: 06/19/24  PT Start Time: 0900     PT Stop Time: 0938  PT Total Time (min): 38 min     Billable Minutes: Therapeutic Activity 15, Therapeutic Exercise 8, and Neuromuscular Re-education 15    Treatment Type: Treatment  PT/PTA: PT     Number of PTA visits since last PT visit: 0     06/19/2024

## 2024-06-19 NOTE — NURSING
CICU DAILY GOALS     CVP: 6, 7, 2  Svo2: 51  Levo gtt turned off.   No VAD alarms overnight.  Flows 4.4-4.8      A: Awake    RASS: Goal - RASS Goal: 0-->alert and calm  Actual - RASS (Chávez Agitation-Sedation Scale): drowsy   Restraint necessity: Clinical Justification: Other  B: Breath   SBT: Not intubated   C: Coordinate A & B, analgesics/sedatives   Pain: managed    SAT: Not intubated  D: Delirium   CAM-ICU: Overall CAM-ICU: Positive  E: Early(intubated/ Progressive (non-intubated) Mobility      Activity: Activity Management: Rolling - L1  FAS: Feeding/Nutrition   Diet order: Diet/Nutrition Received: tube feeding, Specialty Diet/Nutrition Received: renal diet Fluid restriction: Fluid Requirement: 1L FR   Nutritional Supplement Intake: Quantity 60 mL/hr, Type: Novasource renal  T: Thrombus   DVT prophylaxis: VTE Required Core Measure: Pharmacological prophylaxis initiated/maintained  H: HOB Elevation   Head of Bed (HOB) Positioning: HOB at 30-45 degrees  U: Ulcer Prophylaxis   GI: yes  G: Glucose control   uncontrolled Glycemic Management: blood glucose monitored    S: Skin   Nurses Note -- 4 Eyes  Skin assessed during: Q Shift Change   CHOOSE ONE:  [] No Altered Skin Integrity Present      []Prevention Measures Documented    [x] Yes- Altered Skin Integrity Present or Discovered     [x] LDA Added if Not in Epic (Describe Wound)     [] New Altered Skin Integrity was Present on Admit and Documented in LDA     [x] Wound Image Taken  Wound Care Consulted? Yes  Attending Nurse:  Stan Avalos RN/Staff Member:     B: Bowel Function   diarrhea   I: Indwelling Catheters   [place] necessity: [REMOVED]      Urethral Catheter 06/12/24 0300 16 Fr.-Reason for Continuing Urinary Catheterization: Critically ill in ICU and requiring hourly monitoring of intake/output   CVC necessity: Yes   IPAD offered: No      Family/Goals of care/Code Status   Code Status: Full Code     No acute events throughout day, VS and assessment per  flow sheet, patient progressing towards goals as tolerated, plan of care reviewed with Radha Abbott and family, all concerns addressed, will continue to monitor.

## 2024-06-19 NOTE — PLAN OF CARE
Following for dispo planning:    Will send OP HD referrals once patient is tolerating po intake and removal of Joseluis tube which is not accepted at OP HD centers    LVAD placement to OP HD centers often times are not attainable and take lengthy periods of time to try and get an accepting Medical Director and OP center willing to take an LVAD    [facility] VAD program has not found placement in the  or local area in the past     We will reach out to Nephrology for leads, partnerships   amoxicillin-clavulanate 875 mg-125 mg oral tablet: 1 tab(s) orally 2 times a day

## 2024-06-19 NOTE — SUBJECTIVE & OBJECTIVE
"Interval HPI:   No acute events overnight. Patient in room [id number] A. Blood glucose stable. BG at , above and below goal on current insulin regimen (None). Steroid use- None .   Renal function- Abnormal -    Vasopressors-  None      Eating:   <25%  Nausea: No  Hypoglycemia and intervention: No  Fever: No  TPN and/or TF: Tf at 60 cc/hr    BP (!) 56/0 (BP Location: Left arm, Patient Position: Lying)   Pulse 87   Temp 97.5 °F (36.4 °C) (Axillary)   Resp 14   Ht 6' 1" (1.854 m)   Wt 77.5 kg (170 lb 13.7 oz)   SpO2 95%   BMI 22.54 kg/m²     Labs Reviewed and Include    Recent Labs   Lab 06/19/24  0312   *   CALCIUM 8.3*   ALBUMIN 2.1*   PROT 7.2   *   K 3.7   CO2 18*      BUN 32*   CREATININE 2.7*   ALKPHOS 369*   *   *   BILITOT 0.5     Lab Results   Component Value Date    WBC 8.66 06/19/2024    HGB 6.8 (L) 06/19/2024    HCT 20.9 (L) 06/19/2024    MCV 86 06/19/2024     06/19/2024     No results for input(s): "TSH", "FREET4" in the last 168 hours.  Lab Results   Component Value Date    HGBA1C 7.4 (H) 04/17/2024       Nutritional status:   Body mass index is 22.54 kg/m².  Lab Results   Component Value Date    ALBUMIN 2.1 (L) 06/19/2024    ALBUMIN 2.4 (L) 06/18/2024    ALBUMIN 2.3 (L) 06/17/2024     Lab Results   Component Value Date    PREALBUMIN 14 (L) 06/19/2024    PREALBUMIN 15 (L) 06/17/2024    PREALBUMIN 22 06/14/2024       Estimated Creatinine Clearance: 31.1 mL/min (A) (based on SCr of 2.7 mg/dL (H)).    Accu-Checks  Recent Labs     06/17/24  1957 06/18/24  0103 06/18/24  0300 06/18/24  0713 06/18/24  1255 06/18/24  1817 06/18/24 2020 06/18/24  2101 06/19/24  0050 06/19/24  0314   POCTGLUCOSE 178* 255* 246* 211* 231* 186* 257* 245* 166* 127*       Current Medications and/or Treatments Impacting Glycemic Control  Immunotherapy:    Immunosuppressants       None          Steroids:   Hormones (From admission, onward)      None          Pressors:    Autonomic " Drugs (From admission, onward)      Start     Stop Route Frequency Ordered    06/17/24 2245  NORepinephrine 4 mg in dextrose 5% 250 mL infusion (premix)        Question Answer Comment   Begin at (in mcg/kg/min): 0.02    Titrate by: (in mcg/kg/min (RANGE PREFERRED) 0.02 - 0.2    Titrate interval: (in minutes) (RANGE PREFERRED) 2 - 5    Titrate to maintain: (MAP or SBP) MAP    Titrate to keep MAP within the range or GREATER than (if single number): (in mmHg) OTHER    Other >60    Maximum dose: (in mcg/kg/min) 3        -- IV Continuous 06/17/24 0847          Hyperglycemia/Diabetes Medications:   Antihyperglycemics (From admission, onward)      Start     Stop Route Frequency Ordered    06/18/24 1011  insulin aspart U-100 pen 0-5 Units         -- SubQ Every 4 hours PRN 06/18/24 0912

## 2024-06-19 NOTE — PROGRESS NOTES
[facility] - Cardiac Intensive Care  Nephrology  Progress Note    Patient Name: Radha Abbott  MRN: [id number]  Admission Date: 6/11/2024  Hospital Length of Stay: 8 days  Attending Provider: Sajan Hurley, *   Primary Care Physician: Vasu Kong MD  Principal Problem:Acute renal failure superimposed on stage 4 chronic kidney disease    Subjective:     Interval History: HD yesterday, tolerated well. Net UF 2L. Net - 1.9L. CVP 6.     Review of patient's allergies indicates:  No Known Allergies  Current Facility-Administered Medications   Medication Frequency    0.9%  NaCl infusion (for blood administration) Q24H PRN    0.9%  NaCl infusion Continuous    0.9%  NaCl infusion PRN    0.9%  NaCl infusion Once    acetaminophen tablet 650 mg Q6H PRN    acetaminophen tablet 650 mg Q6H PRN    amiodarone tablet 400 mg Daily    atorvastatin tablet 40 mg QHS    dextrose 10 % infusion Continuous PRN    dextrose 10% bolus 125 mL 125 mL PRN    dextrose 10% bolus 250 mL 250 mL PRN    epoetin zohaib injection 4,000 Units Every Mon, Wed, Fri    folic acid tablet 1 mg Daily    glucagon (human recombinant) injection 1 mg PRN    insulin aspart U-100 pen 0-5 Units Q4H PRN    LIDOcaine 5 % patch 2 patch Q24H    NORepinephrine 4 mg in dextrose 5% 250 mL infusion (premix) Continuous    pantoprazole EC tablet 40 mg Daily    piperacillin-tazobactam (ZOSYN) 4.5 g in dextrose 5 % in water (D5W) 100 mL IVPB (MB+) Q12H    QUEtiapine tablet 25 mg QHS    simethicone chewable tablet 80 mg TID PRN    trazodone split tablet 25 mg Nightly PRN    venlafaxine tablet 37.5 mg Daily    vitamin D 1000 units tablet 1,000 Units Daily    warfarin (COUMADIN) tablet 2.5 mg Daily       Objective:     Vital Signs (Most Recent):  Temp: 97.5 °F (36.4 °C) (06/19/24 0300)  Pulse: 87 (06/19/24 0844)  Resp: 14 (06/19/24 0844)  BP: (!) 68/0 (06/19/24 0844)  SpO2: 95 % (06/19/24 0844) Vital Signs (24h Range):  Temp:  [97.5 °F (36.4 °C)-98.9 °F (37.2 °C)] 97.5 °F  (36.4 °C)  Pulse:  [31-89] 87  Resp:  [14-29] 14  SpO2:  [95 %-100 %] 95 %  BP: (56-92)/(0-62) 68/0     Weight: 77.5 kg (170 lb 13.7 oz) (06/19/24 0400)  Body mass index is 22.54 kg/m².  Body surface area is 2 meters squared.    I/O last 3 completed shifts:  In: 1088.4 [I.V.:576.3; NG/GT:50; IV Piggyback:462.1]  Out: 2500 [Other:2500]     Physical Exam  Vitals and nursing note reviewed.   Constitutional:       Appearance: Normal appearance.   HENT:      Head: Normocephalic and atraumatic.   Eyes:      Conjunctiva/sclera: Conjunctivae normal.      Pupils: Pupils are equal, round, and reactive to light.   Neck:      Comments: RIJ trialysis cath.   Cardiovascular:      Rate and Rhythm: Normal rate and regular rhythm.      Comments: Smooth VAD hum  Pulmonary:      Effort: Pulmonary effort is normal.      Breath sounds: Normal breath sounds.   Abdominal:      General: Bowel sounds are normal.      Palpations: Abdomen is soft.   Musculoskeletal:         General: No swelling. Normal range of motion.      Cervical back: Normal range of motion and neck supple.   Skin:     General: Skin is warm and dry.      Capillary Refill: Capillary refill takes 2 to 3 seconds.   Neurological:      General: No focal deficit present.      Mental Status: He is alert and oriented to person, place, and time.          Significant Labs:  CBC:   Recent Labs   Lab 06/19/24  0312   WBC 8.66   RBC 2.42*   HGB 6.8*   HCT 20.9*      MCV 86   MCH 28.1   MCHC 32.5     CMP:   Recent Labs   Lab 06/19/24  0312   *   CALCIUM 8.3*   ALBUMIN 2.1*   PROT 7.2   *   K 3.7   CO2 18*      BUN 32*   CREATININE 2.7*   ALKPHOS 369*   *   *   BILITOT 0.5     All labs within the past 24 hours have been reviewed.     Assessment/Plan:     Cardiac/Vascular  LVAD (left ventricular assist device) present  -HTS following.    Renal/  * Acute renal failure superimposed on stage 4 chronic kidney disease  Patient with a baseline eGFR  that appears to be variable however was noted to be in the 15-20 range in February before his recent IVÁN in May, presents now with fatigue and worsening IVÁN with a creatinine of 9.8 and eGFR of 5.5. BUN was noted to be elevated at 117, other electrolytes were normal. He was noted to have some asterixis on exam (which he states is chronic for him and unchanged from his baseline), however denies any other signs or symptoms of uremia. Bedside bladder scan revealed 200 mL of urine in his bladder and his last bladder void was 10 hours prior to this consult.     SLED initiated 6/13 x4 hours.  Mental status improved.  6/14:  8 hour SLED overnight, net even.    Recommendations:     - Plan for HD tomorrow 6/20  - Epo with HD,   - Fereheme 510 mg IV x 1 today and then next week   -Avoid nephrotoxic agents (IV contrast, NSAID's, gadolinium contrast, PPI's) if able.   -Strict I's and O's  -Renally dose all medications  -will need OP HD upon discharge, recommend CM/SW involvement to start workup.  -Please consult IR for TDC rand         Thank you for your consult. I will follow-up with patient. Please contact us if you have any additional questions.    Yolanda Cai, RONI  Nephrology  [facility] - Cardiac Intensive Care

## 2024-06-19 NOTE — ASSESSMENT & PLAN NOTE
-HeartMate 3 Implanted  12/1/2023  as DT  -Cont Coumadin, dosing by PharmD.  Goal INR 2.0-3.0. therapeutic today.   -Antiplatelets Not on ASA  -LDH is stable overall today. Will continue to monitor daily.  -Speed set at  5000     -Interrogation notable for no events  -Not listed for OHTx, declined for OHTX due to comorbidities     Procedure: Device Interrogation Including analysis of device parameters  Current Settings: Ventricular Assist Device  Review of device function is stable        6/19/2024     8:58 AM 6/19/2024     7:01 AM 6/19/2024     6:01 AM 6/19/2024     4:01 AM 6/19/2024     3:01 AM 6/19/2024     2:01 AM 6/19/2024     1:01 AM   TXP LVAD INTERROGATIONS   Type HeartMate3  HeartMate3 HeartMate3 HeartMate3 HeartMate3 HeartMate3   Flow 4.5 4.8 4.7 4.6 4.7 4.5 4.5   Speed 5000 5000 5000 5000 5000 5000 5000   PI 3.3 2.4 3 3.1 3.3 3.7 3.4   Power ([place]) 3.4 3.4 3.4 3.3 3.6 3.4 3.4   LSL 4600  4600 4600 4600 4600 4600   Pulsatility Intermittent pulse  Intermittent pulse Intermittent pulse Intermittent pulse Intermittent pulse Intermittent pulse

## 2024-06-19 NOTE — ASSESSMENT & PLAN NOTE
BG goal 140-180  T2DM.  LVAD. Admitted for worsening renal function with a BUN>100/Cr. 9.9. Hypoglycemia likely secondary to RIVAS (glimepiride) with worsened kidney function. Last took on 6/11. D10 drip stopped and started on TF.  Will monitor on correction insulin for now and consider scheduled insulin with TF if continues to have elevations.  Below goal overnight.    Plan:  -Novolog /50  -BG monitoring ac/hs  -Hypoglycemia protocol in place    ** Please call Endocrine for any BG related issues **    Discharge plans:   TBD  -D/C Amaryl

## 2024-06-19 NOTE — PROGRESS NOTES
[facility] - Cardiac Intensive Care  Heart Transplant  Progress Note    Patient Name: Radha Abbott  MRN: [id number]  Admission Date: 6/11/2024  Hospital Length of Stay: 8 days  Attending Physician: Sajan Hurley, *  Primary Care Provider: Vasu Kong MD  Principal Problem:Acute renal failure superimposed on stage 4 chronic kidney disease    Subjective:   Interval History: S/p UF yesterday with 2L removed. CVP this AM down to 5 with improvement of CXR. Mental status continues to improve. Passed speech eval for modified diet. Remains on intermittent Levophed. H&H slightly dropped again. Supplementing iron today. Midodrine also started. Will likely get HD tomorrow.         Continuous Infusions:   sodium chloride 0.9%   Intravenous Continuous   Stopped at 06/13/24 0117    dextrose 10 % in water (D10W)   Intravenous Continuous PRN        NORepinephrine bitartrate-D5W  0-3 mcg/kg/min Intravenous Continuous 5.9 mL/hr at 06/19/24 1356 0.02 mcg/kg/min at 06/19/24 1356     Scheduled Meds:   sodium chloride 0.9%   Intravenous Once    amiodarone  400 mg Oral Daily    atorvastatin  40 mg Oral QHS    epoetin zohaib (PROCRIT) injection  4,000 Units Intravenous Every Mon, Wed, Fri    ferumoxytoL (FERAHEME) 510 mg in dextrose 5 % (D5W) 117 mL IVPB  510 mg Intravenous Once    folic acid  1 mg Oral Daily    gabapentin  100 mg Oral BID    LIDOcaine  2 patch Transdermal Q24H    midodrine  10 mg Oral Q8H    pantoprazole  40 mg Oral Daily    piperacillin-tazobactam (Zosyn) IV (PEDS and ADULTS) (extended infusion is not appropriate)  4.5 g Intravenous Q12H    QUEtiapine  25 mg Oral QHS    venlafaxine  37.5 mg Oral Daily    vitamin D  1,000 Units Oral Daily    warfarin  2.5 mg Oral Daily     PRN Meds:  Current Facility-Administered Medications:     0.9%  NaCl infusion (for blood administration), , Intravenous, Q24H PRN    sodium chloride 0.9%, , Intravenous, PRN    acetaminophen, 650 mg, Oral, Q6H PRN    acetaminophen, 650 mg,  Per NG tube, Q6H PRN    dextrose 10 % in water (D10W), , Intravenous, Continuous PRN    dextrose 10%, 12.5 g, Intravenous, PRN    dextrose 10%, 25 g, Intravenous, PRN    glucagon (human recombinant), 1 mg, Intramuscular, PRN    insulin aspart U-100, 0-5 Units, Subcutaneous, Q4H PRN    simethicone, 1 tablet, Oral, TID PRN    traZODone, 25 mg, Oral, Nightly PRN    Review of patient's allergies indicates:  No Known Allergies  Objective:     Vital Signs (Most Recent):  Temp: 97.7 °F (36.5 °C) (06/19/24 1100)  Pulse: 86 (06/19/24 1100)  Resp: (!) 26 (06/19/24 1100)  BP: 103/66 (06/19/24 1100)  SpO2: 98 % (06/19/24 1100) Vital Signs (24h Range):  Temp:  [97.5 °F (36.4 °C)-98.9 °F (37.2 °C)] 97.7 °F (36.5 °C)  Pulse:  [31-89] 86  Resp:  [14-43] 26  SpO2:  [95 %-100 %] 98 %  BP: ()/(0-66) 103/66     Patient Vitals for the past 72 hrs (Last 3 readings):   Weight   06/19/24 0400 77.5 kg (170 lb 13.7 oz)   06/17/24 0501 78.6 kg (173 lb 4.5 oz)     Body mass index is 22.54 kg/m².      Intake/Output Summary (Last 24 hours) at 6/19/2024 1427  Last data filed at 6/19/2024 1100  Gross per 24 hour   Intake 1060.1 ml   Output 2560 ml   Net -1499.9 ml         Telemetry: SR       Physical Exam  Constitutional:       Appearance: Normal appearance.   HENT:      Head: Normocephalic and atraumatic.   Eyes:      Conjunctiva/sclera: Conjunctivae normal.      Pupils: Pupils are equal, round, and reactive to light.   Neck:      Comments: JVD visible   Cardiovascular:      Rate and Rhythm: Normal rate and regular rhythm.      Comments: Smooth VAD hum  Pulmonary:      Effort: Pulmonary effort is normal.      Breath sounds: Normal breath sounds.   Abdominal:      General: Bowel sounds are normal.      Palpations: Abdomen is soft.   Musculoskeletal:         General: No swelling. Normal range of motion.      Cervical back: Normal range of motion and neck supple.   Skin:     General: Skin is warm and dry.      Capillary Refill: Capillary  refill takes 2 to 3 seconds.   Neurological:      General: No focal deficit present.      Mental Status: He is alert.      Comments: Oriented x2. Intermittent confusion/agitation             Significant Labs:  CBC:  Recent Labs   Lab 06/17/24 0319 06/18/24 0300 06/19/24 0312   WBC 12.21 10.67 8.66   RBC 2.37* 2.77* 2.42*   HGB 6.5* 7.7* 6.8*   HCT 20.9* 24.0* 20.9*    318 334   MCV 88 87 86   MCH 27.4 27.8 28.1   MCHC 31.1* 32.1 32.5     BNP:  Recent Labs   Lab 06/14/24 0221 06/17/24 0319 06/19/24 0312   BNP 1,542* 1,023* 823*     CMP:  Recent Labs   Lab 06/17/24 0319 06/18/24 0300 06/18/24  1816 06/19/24 0312   GLU 96 251* 182* 126*   CALCIUM 8.7 8.3* 8.8 8.3*   ALBUMIN 2.3*  --  2.4* 2.1*   PROT 7.0  --  7.8 7.2   * 128* 131* 131*   K 3.7 3.4* 4.1 3.7   CO2 22* 20* 18* 18*   CL 96 97 102 102   BUN 22 25* 17 32*   CREATININE 2.4* 2.5* 1.8* 2.7*   ALKPHOS 287*  --  381* 369*   *  --  196* 159*   *  --  136* 110*   BILITOT 0.7  --  0.8 0.5      Coagulation:   Recent Labs   Lab 06/17/24 0319 06/18/24 0300 06/19/24 0312   INR 1.8* 1.9* 2.2*   APTT 40.2* 40.3* 40.0*     LDH:  Recent Labs   Lab 06/17/24 0319 06/18/24 0300 06/19/24 0312   * 369* 402*     Microbiology:  Microbiology Results (last 7 days)       Procedure Component Value Units Date/Time    Culture, Respiratory with Gram Stain [[id number]]     Order Status: No result Specimen: Respiratory     Blood culture #1 **CANNOT BE ORDERED STAT** [[id number]] Collected: 06/11/24 2122    Order Status: Completed Specimen: Blood from Peripheral, Antecubital, Right Updated: 06/16/24 2212     Blood Culture, Routine No growth after 5 days.    Blood culture #2 **CANNOT BE ORDERED STAT** [[id number]] Collected: 06/11/24 2121    Order Status: Completed Specimen: Blood from Peripheral, Hand, Left Updated: 06/16/24 2212     Blood Culture, Routine No growth after 5 days.    Urine culture [[id number]] Collected: 06/12/24 1422    Order  Status: Completed Specimen: Urine Updated: 06/13/24 2113     Urine Culture, Routine No growth    Narrative:      Specimen Source->Urine            I have reviewed all pertinent labs within the past 24 hours.    Estimated Creatinine Clearance: 31.1 mL/min (A) (based on SCr of 2.7 mg/dL (H)).    Diagnostic Results:  I have reviewed and interpreted all pertinent imaging results/findings within the past 24 hours.  Assessment and Plan:     Mr. Radha Abbott is a 63 yo male with stage D HFrEF, ICMP who underwent HM3 placement as DT (12/1/23) w/ a hospital stay complicated by vasoplegia(requiring Giaprezza), debility, malnutrition/impaired swallowing, and renal failure requiring HD (since weaned off) w/ recent discharge for ADHF presented to the ED with wife due to c/o worsening generalized weakness since 6/10. Per wife patient was mostly sleeping during the day despite sleeping 10 hrs at night. Patient also admits decreased appetite and decreased urine output despite taking his medications as prescribed. He has not urinated since 6/11 at 2pm. He has chronic shortness of breath on exertion. Denies confusion, syncope, diarrhea, constipation, N/V, dysuria, or other complaints. Patient had his prior tunnel cath removed used for HD about 2 weeks ago. He completed a 6 week therapy for Enterococcus bacteremia with ampicillin and rocephin ending 5/30/24a and was switched to PO antibiotics.     On prior hospital stay nephrology consulted during hospital stay due to elevated renal function. They feel he is close to being a dialysis patient. He diuresed on IV Bumex with prn Metolazone. He is net negative 1.2L throughout his hospital stay and weight on day of discharge was 179lbs down from 186lbs on admit. We continued his home dose of Bumex 4mg BID but increased his prn Metolazone to 10mg. He received a dose of Epo while inpatient and he has plans to follow up with Heme/Onc to get outpatient therapy arranged.      2D Echo with CFD  done on 3/26/2024  LVAD: There is a Heartmate III LVAD running at 5000 RPM. The aortic valve does not open. The ventricular septum is at midline. Inflow cannula well seated at the apex. Outflow graft not visualized.   Left Ventricle: The left ventricle is moderately dilated. Normal wall thickness. Global hypokinesis present. Septal motion is abnormal. There is severely reduced systolic function with a visually estimated ejection fraction of 5 - 10%. There is diastolic dysfunction but grade cannot be determined.   Right Ventricle: Mild right ventricular enlargement. Wall thickness is normal. Right ventricle wall motion is normal. Systolic function is normal. Pacemaker lead present in the ventricle.   Left Atrium: Left atrium is severely dilated.   Right Atrium: Right atrium is moderately dilated.   Mitral Valve: The mitral valve is repaired with an Noble stitch. There is mild regurgitation.   IVC/SVC: Normal venous pressure at 3 mmHg.              * Acute renal failure superimposed on stage 4 chronic kidney disease  Mr. Radha Abbott is a 65 yo male with stage D HFrEF, ICMP who underwent HM3 placement as DT, DM2, CKD4, HTN, E.faecalis bacteremia who presented due to c/o worsening generalized weakness since 6/10. Admitted for worsening renal function with a BUN>100/Cr. 9.9.     - CT Ab/pel: Kidneys/ Ureters: Normal in size and location.  Mild bilateral perinephric stranding, a nonspecific finding and similar to prior.  No hydronephrosis or nephrolithiasis. No ureteral dilatation.  - Nephrology consulted, appreciate recs.   - HD per nephro   - Will need OP HD upon discharge, recommend CM/SW involvement to start workup.  - Will need IR evaluation once out of ICU for TDC placement.  - Renally dosing medications      LVAD (left ventricular assist device) present  -HeartMate 3 Implanted  12/1/2023  as DT  -Cont Coumadin, dosing by PharmD.  Goal INR 2.0-3.0. therapeutic today.   -Antiplatelets Not on ASA  -LDH is stable  overall today. Will continue to monitor daily.  -Speed set at  5000     -Interrogation notable for no events  -Not listed for OHTx, declined for OHTX due to comorbidities     Procedure: Device Interrogation Including analysis of device parameters  Current Settings: Ventricular Assist Device  Review of device function is stable        6/19/2024     8:58 AM 6/19/2024     7:01 AM 6/19/2024     6:01 AM 6/19/2024     4:01 AM 6/19/2024     3:01 AM 6/19/2024     2:01 AM 6/19/2024     1:01 AM   TXP LVAD INTERROGATIONS   Type HeartMate3  HeartMate3 HeartMate3 HeartMate3 HeartMate3 HeartMate3   Flow 4.5 4.8 4.7 4.6 4.7 4.5 4.5   Speed 5000 5000 5000 5000 5000 5000 5000   PI 3.3 2.4 3 3.1 3.3 3.7 3.4   Power (Carrington) 3.4 3.4 3.4 3.3 3.6 3.4 3.4   LSL 4600  4600 4600 4600 4600 4600   Pulsatility Intermittent pulse  Intermittent pulse Intermittent pulse Intermittent pulse Intermittent pulse Intermittent pulse         Aspiration pneumonia of both lungs  -CT chest and CXR concerning for pneumonia   -Sputum cx ordered  -Continue  Zosyn, could likely de-escalate     Delirium  -slowly improving   -CT head-toe neg.   -Continue Seroquel.       Bacteremia due to Enterococcus  He completed a 6 week therapy for Enterococcus bacteremia with ampicillin and rocephin ending 5/30/24a and was switched to Amoxicillin for suppression    Anticoagulant long-term use  Cont warfarin    Anemia  Chronic with no acute bleeding, multifactorial 2/2 CKD4 and chronic anticoagulation  -Iron panel done. Iron deficient but Ferritin is elevated at 1186  -Homocysteine normal and methylmalonic pending   -s/p 1 pRBC yesterday   -Epo today  -Replacing with IV iron   -Continue to trend CBC's    Adjustment disorder with depressed mood  Cont SSRI    PAF (paroxysmal atrial fibrillation)  -Continue amiodarone and Coumadin      Type 2 diabetes mellitus without complication, without long-term current use of insulin  -Endocrine    Acute on chronic combined systolic and  diastolic heart failure  Owing to his advanced kidney disease and significantly elevated creat, he is not on a  ACEI/ARB/ARNI or MRA. He is also not on a beta blocker due to RV dysfunction.     Plan:   - See LVAD  - See IVÁN    CAD (coronary artery disease)  -Continue statin    .Uninterrupted Critical Care/Counseling Time (not including procedures): 60 mins     Hugh Duran NP  Heart Transplant  [facility] - Cardiac Int[facility]

## 2024-06-19 NOTE — PROGRESS NOTES
06/19/2024  Jonathanyoshi Ho Jr    Current provider:  Sajan Hurley, *    Device interrogation:      6/19/2024     8:58 AM 6/19/2024     7:01 AM 6/19/2024     6:01 AM 6/19/2024     4:01 AM 6/19/2024     3:01 AM 6/19/2024     2:01 AM 6/19/2024     1:01 AM   TXP LVAD INTERROGATIONS   Type HeartMate3  HeartMate3 HeartMate3 HeartMate3 HeartMate3 HeartMate3   Flow 4.5 4.8 4.7 4.6 4.7 4.5 4.5   Speed 5000 5000 5000 5000 5000 5000 5000   PI 3.3 2.4 3 3.1 3.3 3.7 3.4   Power ([place]rington) 3.4 3.4 3.4 3.3 3.6 3.4 3.4   LSL 4600  4600 4600 4600 4600 4600   Pulsatility Intermittent pulse  Intermittent pulse Intermittent pulse Intermittent pulse Intermittent pulse Intermittent pulse          Rounded on [facility] to ensure all mechanical assist device settings (IABP or VAD) were appropriate and all parameters were within limits.  I was able to ensure all back up equipment was present, the staff had no issues, and the Perfusion Department daily rounding was complete.      For implantable VADs: Interrogation of Ventricular assist device was performed with analysis of device parameters and review of device function. I have personally reviewed the interrogation findings and agree with findings as stated.     In emergency, the nursing units have been notified to contact the perfusion department either by:  Calling [phone] from 630am to 4pm Mon thru Fri, utilizing the On-Call Finder functionality of Epic and searching for Perfusion, or by contacting the hospital  from 4pm to 630am and on weekends and asking to speak with the perfusionist on call.    10:33 AM

## 2024-06-19 NOTE — ASSESSMENT & PLAN NOTE
Mr. Radha Abbott is a 65 yo male with stage D HFrEF, ICMP who underwent HM3 placement as DT, DM2, CKD4, HTN, E.faecalis bacteremia who presented due to c/o worsening generalized weakness since 6/10. Admitted for worsening renal function with a BUN>100/Cr. 9.9.     - CT Ab/pel: Kidneys/ Ureters: Normal in size and location.  Mild bilateral perinephric stranding, a nonspecific finding and similar to prior.  No hydronephrosis or nephrolithiasis. No ureteral dilatation.  - Nephrology consulted, appreciate recs.   - HD per nephro   - Will need OP HD upon discharge, recommend CM/SW involvement to start workup.  - Will need IR evaluation once out of ICU for TDC placement.  - Renally dosing medications

## 2024-06-19 NOTE — ASSESSMENT & PLAN NOTE
Chronic with no acute bleeding, multifactorial 2/2 CKD4 and chronic anticoagulation  -Iron panel done. Iron deficient but Ferritin is elevated at 1186  -Homocysteine normal and methylmalonic pending   -s/p 1 pRBC yesterday   -Epo today  -Replacing with IV iron   -Continue to trend CBC's

## 2024-06-19 NOTE — ASSESSMENT & PLAN NOTE
-CT chest and CXR concerning for pneumonia   -Sputum cx ordered  -Continue  Zosyn, could likely de-escalate

## 2024-06-19 NOTE — SUBJECTIVE & OBJECTIVE
Interval History: HD yesterday, tolerated well. Net UF 2L. Net - 1.9L. CVP 6.     Review of patient's allergies indicates:  No Known Allergies  Current Facility-Administered Medications   Medication Frequency    0.9%  NaCl infusion (for blood administration) Q24H PRN    0.9%  NaCl infusion Continuous    0.9%  NaCl infusion PRN    0.9%  NaCl infusion Once    acetaminophen tablet 650 mg Q6H PRN    acetaminophen tablet 650 mg Q6H PRN    amiodarone tablet 400 mg Daily    atorvastatin tablet 40 mg QHS    dextrose 10 % infusion Continuous PRN    dextrose 10% bolus 125 mL 125 mL PRN    dextrose 10% bolus 250 mL 250 mL PRN    epoetin zohaib injection 4,000 Units Every Mon, Wed, Fri    folic acid tablet 1 mg Daily    glucagon (human recombinant) injection 1 mg PRN    insulin aspart U-100 pen 0-5 Units Q4H PRN    LIDOcaine 5 % patch 2 patch Q24H    NORepinephrine 4 mg in dextrose 5% 250 mL infusion (premix) Continuous    pantoprazole EC tablet 40 mg Daily    piperacillin-tazobactam (ZOSYN) 4.5 g in dextrose 5 % in water (D5W) 100 mL IVPB (MB+) Q12H    QUEtiapine tablet 25 mg QHS    simethicone chewable tablet 80 mg TID PRN    trazodone split tablet 25 mg Nightly PRN    venlafaxine tablet 37.5 mg Daily    vitamin D 1000 units tablet 1,000 Units Daily    warfarin (COUMADIN) tablet 2.5 mg Daily       Objective:     Vital Signs (Most Recent):  Temp: 97.5 °F (36.4 °C) (06/19/24 0300)  Pulse: 87 (06/19/24 0844)  Resp: 14 (06/19/24 0844)  BP: (!) 68/0 (06/19/24 0844)  SpO2: 95 % (06/19/24 0844) Vital Signs (24h Range):  Temp:  [97.5 °F (36.4 °C)-98.9 °F (37.2 °C)] 97.5 °F (36.4 °C)  Pulse:  [31-89] 87  Resp:  [14-29] 14  SpO2:  [95 %-100 %] 95 %  BP: (56-92)/(0-62) 68/0     Weight: 77.5 kg (170 lb 13.7 oz) (06/19/24 0400)  Body mass index is 22.54 kg/m².  Body surface area is 2 meters squared.    I/O last 3 completed shifts:  In: 1088.4 [I.V.:576.3; NG/GT:50; IV Piggyback:462.1]  Out: 2500 [Other:2500]     Physical Exam  Vitals and  nursing note reviewed.   Constitutional:       Appearance: Normal appearance.   HENT:      Head: Normocephalic and atraumatic.   Eyes:      Conjunctiva/sclera: Conjunctivae normal.      Pupils: Pupils are equal, round, and reactive to light.   Neck:      Comments: RIJ trialysis cath.   Cardiovascular:      Rate and Rhythm: Normal rate and regular rhythm.      Comments: Smooth VAD hum  Pulmonary:      Effort: Pulmonary effort is normal.      Breath sounds: Normal breath sounds.   Abdominal:      General: Bowel sounds are normal.      Palpations: Abdomen is soft.   Musculoskeletal:         General: No swelling. Normal range of motion.      Cervical back: Normal range of motion and neck supple.   Skin:     General: Skin is warm and dry.      Capillary Refill: Capillary refill takes 2 to 3 seconds.   Neurological:      General: No focal deficit present.      Mental Status: He is alert and oriented to person, place, and time.          Significant Labs:  CBC:   Recent Labs   Lab 06/19/24  0312   WBC 8.66   RBC 2.42*   HGB 6.8*   HCT 20.9*      MCV 86   MCH 28.1   MCHC 32.5     CMP:   Recent Labs   Lab 06/19/24  0312   *   CALCIUM 8.3*   ALBUMIN 2.1*   PROT 7.2   *   K 3.7   CO2 18*      BUN 32*   CREATININE 2.7*   ALKPHOS 369*   *   *   BILITOT 0.5     All labs within the past 24 hours have been reviewed.

## 2024-06-19 NOTE — PROGRESS NOTES
[facility] - Cardiac Intensive Care  Endocrinology  Progress Note    Admit Date: 6/11/2024     Reason for Consult: Management of T2DM, Hyperglycemia     Surgical Procedure and Date: HM3 placement as DT (12/1/23)     Diabetes diagnosis year: several years ago    Home Diabetes Medications:  Amaryl 1 mg (recently prescribed by PCP about a week ago)     Lab Results   Component Value Date    HGBA1C 7.4 (H) 04/17/2024       How often checking glucose at home? Once daily in the AM ( prior to starting Amaryl BG in the 300s, since starting Amaryl BG 70-130s)   BG readings on regimen: 70-130s  Hypoglycemia on the regimen?  No  Missed doses on regimen?  No    Diabetes Complications include:     Hypoglycemia , Diabetic nephropathy  , Diabetic chronic kidney disease     , and Diabetic peripheral neuropathy     Complicating diabetes co morbidities:   CHF and CKD      HPI:   Patient is a 64 y.o. male with a diagnosis of stage D HFrEF, ICMP who underwent HM3 placement as DT (12/1/23) w/ a hospital stay complicated by vasoplegia(requiring Giaprezza), debility, malnutrition/impaired swallowing, and renal failure requiring HD (since weaned off) w/ recent discharge for ADHF presented to the ED with wife due to c/o worsening generalized weakness since 6/10. Per wife patient was mostly sleeping during the day despite sleeping 10 hrs at night. Patient also admits decreased appetite and decreased urine output despite taking his medications as prescribed. He has chronic shortness of breath on exertion. Denies confusion, syncope, diarrhea, constipation, N/V, dysuria, or other complaints. Patient had his prior tunnel cath removed used for HD about 2 weeks ago. He completed a 6 week therapy for Enterococcus bacteremia with ampicillin and rocephin ending 5/30/24 and was switched to PO antibiotics. Off note, patient was recently started by his PCP on glimepiride 1mg and unclear of BG trends during the day. Per patient last fasting  on 6/11.  "Patient reports lasting taking the glimepiride on 6/11 AM.   Admitted for worsening renal function with a BUN>100/Cr. 9.9. Endocrinology consulted for management of T2DM.          Interval HPI:   No acute events overnight. Patient in room [id number]/[id number] A. Blood glucose stable. BG at , above and below goal on current insulin regimen (None). Steroid use- None .   Renal function- Abnormal -    Vasopressors-  None      Eating:   <25%  Nausea: No  Hypoglycemia and intervention: No  Fever: No  TPN and/or TF: Tf at 60 cc/hr    BP (!) 56/0 (BP Location: Left arm, Patient Position: Lying)   Pulse 87   Temp 97.5 °F (36.4 °C) (Axillary)   Resp 14   Ht 6' 1" (1.854 m)   Wt 77.5 kg (170 lb 13.7 oz)   SpO2 95%   BMI 22.54 kg/m²     Labs Reviewed and Include    Recent Labs   Lab 06/19/24 0312   *   CALCIUM 8.3*   ALBUMIN 2.1*   PROT 7.2   *   K 3.7   CO2 18*      BUN 32*   CREATININE 2.7*   ALKPHOS 369*   *   *   BILITOT 0.5     Lab Results   Component Value Date    WBC 8.66 06/19/2024    HGB 6.8 (L) 06/19/2024    HCT 20.9 (L) 06/19/2024    MCV 86 06/19/2024     06/19/2024     No results for input(s): "TSH", "FREET4" in the last 168 hours.  Lab Results   Component Value Date    HGBA1C 7.4 (H) 04/17/2024       Nutritional status:   Body mass index is 22.54 kg/m².  Lab Results   Component Value Date    ALBUMIN 2.1 (L) 06/19/2024    ALBUMIN 2.4 (L) 06/18/2024    ALBUMIN 2.3 (L) 06/17/2024     Lab Results   Component Value Date    PREALBUMIN 14 (L) 06/19/2024    PREALBUMIN 15 (L) 06/17/2024    PREALBUMIN 22 06/14/2024       Estimated Creatinine Clearance: 31.1 mL/min (A) (based on SCr of 2.7 mg/dL (H)).    Accu-Checks  Recent Labs     06/17/24 1957 06/18/24  0103 06/18/24  0300 06/18/24  0713 06/18/24  1255 06/18/24  1817 06/18/24  2020 06/18/24  2101 06/19/24  0050 06/19/24  0314   POCTGLUCOSE 178* 255* 246* 211* 231* 186* 257* 245* 166* 127*       Current Medications and/or " Treatments Impacting Glycemic Control  Immunotherapy:    Immunosuppressants       None          Steroids:   Hormones (From admission, onward)      None          Pressors:    Autonomic Drugs (From admission, onward)      Start     Stop Route Frequency Ordered    06/17/24 2245  NORepinephrine 4 mg in dextrose 5% 250 mL infusion (premix)        Question Answer Comment   Begin at (in mcg/kg/min): 0.02    Titrate by: (in mcg/kg/min (RANGE PREFERRED) 0.02 - 0.2    Titrate interval: (in minutes) (RANGE PREFERRED) 2 - 5    Titrate to maintain: (MAP or SBP) MAP    Titrate to keep MAP within the range or GREATER than (if single number): (in mmHg) OTHER    Other >60    Maximum dose: (in mcg/kg/min) 3        -- IV Continuous 06/17/24 2237          Hyperglycemia/Diabetes Medications:   Antihyperglycemics (From admission, onward)      Start     Stop Route Frequency Ordered    06/18/24 1011  insulin aspart U-100 pen 0-5 Units         -- SubQ Every 4 hours PRN 06/18/24 0912            ASSESSMENT and PLAN    Cardiac/Vascular  LVAD (left ventricular assist device) present  Managed per primary team  Avoid hypoglycemia        Renal/  * Acute renal failure superimposed on stage 4 chronic kidney disease  Titrate insulin slowly to avoid hypoglycemia as the risk of hypoglycemia increases with decreased creatinine clearance.        Endocrine  Type 2 diabetes mellitus without complication, without long-term current use of insulin  BG goal 140-180  T2DM.  LVAD. Admitted for worsening renal function with a BUN>100/Cr. 9.9. Hypoglycemia likely secondary to RIVAS (glimepiride) with worsened kidney function. Last took on 6/11. D10 drip stopped and started on TF.  Will monitor on correction insulin for now and consider scheduled insulin with TF if continues to have elevations.  Below goal overnight.    Plan:  -Novolog /50  -BG monitoring ac/hs  -Hypoglycemia protocol in place    ** Please call Endocrine for any BG related issues  **    Discharge plans:   TBD  -D/C Ammarci Wilson PA-C  Endocrinology  [facility] - Cardiac Int[facility]

## 2024-06-19 NOTE — PT/OT/SLP PROGRESS
Occupational Therapy   Co-Treatment  Co-treatment performed due to patient's multiple deficits requiring two skilled therapists to appropriately and safely assess patient's strength and endurance while facilitating functional tasks in addition to accommodating for patient's activity tolerance.      Name: Radha Abbott  MRN: [id number]  Admitting Diagnosis:  Acute renal failure superimposed on stage 4 chronic kidney disease       Recommendations:     Discharge Recommendations: Moderate Intensity Therapy  Discharge Equipment Recommendations:  hospital bed, wheelchair  Barriers to discharge:  None    Assessment:     Radha Abbott is a 64 y.o. male with a medical diagnosis of Acute renal failure superimposed on stage 4 chronic kidney disease.  He presents with the following performance deficits affecting function are weakness, impaired endurance, impaired self care skills, impaired functional mobility, gait instability, impaired balance, decreased coordination, decreased upper extremity function, decreased lower extremity function, decreased safety awareness, impaired cardiopulmonary response to activity. Pt met HOB elevated with spouse present at bedside. Pt demonstrated improvements in alertness, communication, and command following this day with increased participation. He continues to be limited by generalized weakness, decreased activity tolerance, decreased fine/gross motor coordination, and instability. Pt would continue to benefit from skilled OT services to maximize functional independence with ADLs and functional mobility, reduce caregiver burden, and facilitate safe discharge in the least restrictive environment.      Rehab Prognosis:  Good; patient would benefit from acute skilled OT services to address these deficits and reach maximum level of function.       Plan:     Patient to be seen 4 x/week to address the above listed problems via self-care/home management, therapeutic activities, therapeutic exercises,  neuromuscular re-education  Plan of Care Expires: 07/14/24  Plan of Care Reviewed with: patient, spouse    Subjective     Chief Complaint: pain in (B) feet  Patient/Family Comments/goals: to progress toward goals  Pain/Comfort:  Pain Rating 1: 0/10  Pain Rating Post-Intervention 1: 0/10    Objective:   Additional staff present:  Shannon, PT    Communicated with: RN prior to session.  Patient found HOB elevated with blood pressure cuff, NG tube, LVAD, peripheral IV, pulse ox (continuous), telemetry, central line, arterial line upon OT entry to room.    General Precautions: Standard, fall, LVAD    Orthopedic Precautions:N/A  Braces: N/A  Respiratory Status: Room air     Occupational Performance:     Bed Mobility:    Patient completed Scooting/Bridging with total assistance and 2 persons  Patient completed Supine to Sit with moderate assistance  Patient completed Sit to Supine with maximal assistance and 2 persons     Functional Mobility/Transfers:  Pt tolerated sitting EOB for ~25 minutes with minimum-contact guard assist      Activities of Daily Living:  Feeding:  minimum assistance to support (R) wrist as pt grasped and brought cup/straw to mouth + grasped 1 singular fruit loop and brought to mouth  Grooming: moderate assistance required for (R) elbow/wrist support as pt brushed teeth sitting EOB; set up required      [facility] 6 Click ADL: 12    Treatment & Education:  -Education on energy conservation and task modification to maximize safety and (I) during ADLs and mobility  -Education on importance of OOB activity to improve overall activity tolerance and promote recovery  -Pt educated to call for assistance and to transfer with hospital staff only  -Provided education regarding role of OT, POC, & discharge recommendations with pt and spouse verbalizing understanding.  Pt had no further questions & when asked whether there were any concerns pt reported none.     Patient left HOB elevated with all lines intact, call  button in reach, RN notified, and spouse and MD team present    GOALS:   Multidisciplinary Problems       Occupational Therapy Goals          Problem: Occupational Therapy    Goal Priority Disciplines Outcome Interventions   Occupational Therapy Goal     OT, PT/OT Progressing    Description: Goals to be met by: 7/14/24     Patient will increase functional independence with ADLs by performing:    UE Dressing with Set-up Assistance.  LE Dressing with Minimal Assistance.  Grooming while standing at sink with Stand-by Assistance.  Toileting from toilet/bedside commode with Minimal Assistance for hygiene and clothing management.   Supine to sit with Stand-by Assistance.  Step transfer with Contact Guard Assistance  Toilet transfer to toilet/bedside commode with Contact Guard Assistance.                         Time Tracking:     OT Date of Treatment: 06/19/24  OT Start Time: 0900  OT Stop Time: 0938  OT Total Time (min): 38 min    Billable Minutes:Self Care/Home Management 15  Neuromuscular Re-education 23    OT/PRIETO: OT          6/19/2024

## 2024-06-19 NOTE — CARE UPDATE
Heart Transplant Care Update Note:    Hemodynamics@8pm:  CVP:6  SVO2:51   CO: 5.4  CI: 2.7  SVR: 970        Continuous Infusions:   sodium chloride 0.9%   Intravenous Continuous   Stopped at 06/13/24 0117    dexmedeTOMIDine (Precedex) infusion (titrating)  0-1.4 mcg/kg/hr Intravenous Continuous   Stopped at 06/17/24 2222    dextrose 10 % in water (D10W)   Intravenous Continuous PRN        NORepinephrine bitartrate-D5W  0-3 mcg/kg/min Intravenous Continuous 5.9 mL/hr at 06/18/24 1901 0.02 mcg/kg/min at 06/18/24 1901       Intake/Output Summary (Last 24 hours) at 6/18/2024 2111  Last data filed at 6/18/2024 1901  Gross per 24 hour   Intake 771.76 ml   Output 2500 ml   Net -1728.24 ml           Plan:  Wean off levo as able      Discussed with HTS Attending      Erin CONRAD MD  Cardiovascular Disease PGY IV  [facility]

## 2024-06-19 NOTE — PLAN OF CARE
CICU DAILY GOALS       A: Awake    RASS: Goal - RASS Goal: 0-->alert and calm  Actual - RASS (Chávez Agitation-Sedation Scale): alert and calm   Restraint necessity: Clinical Justification: Other  B: Breath   SBT: N/A   C: Coordinate A & B, analgesics/sedatives   Pain: managed    SAT: NA  D: Delirium   CAM-ICU: Overall CAM-ICU: Negative  E: Early(intubated/ Progressive (non-intubated) Mobility   MOVE Screen: Pass   Activity: Activity Management: Ankle pumps - L1, Arm raise - L1  FAS: Feeding/Nutrition   Diet order: Diet/Nutrition Received: tube feeding, Specialty Diet/Nutrition Received: renal diet Fluid restriction: Fluid Requirement: 1L FR   Nutritional Supplement Intake: Quantity 1, Type: Novasource  T: Thrombus   DVT prophylaxis: VTE Required Core Measure: Pharmacological prophylaxis initiated/maintained  H: HOB Elevation   Head of Bed (HOB) Positioning: HOB at 30-45 degrees  U: Ulcer Prophylaxis   GI: yes  G: Glucose control   managed Glycemic Management: blood glucose monitored    S: Skin   Nurses Note -- 4 Eyes  Skin assessed during: Daily Assessment   CHOOSE ONE:  [] No Altered Skin Integrity Present      [x]Prevention Measures Documented    [x] Yes- Altered Skin Integrity Present or Discovered     [x] LDA Added if Not in Epic (Describe Wound)     [] New Altered Skin Integrity was Present on Admit and Documented in LDA     [] Wound Image Taken  Wound Care Consulted? Yes  Attending Nurse:  Norberto Avalos RN/Staff Member:  KYAW Goodwin    B: Bowel Function   no issues   I: Indwelling Catheters   [place] necessity: [REMOVED]      Urethral Catheter 06/12/24 0300 16 Fr.-Reason for Continuing Urinary Catheterization: Critically ill in ICU and requiring hourly monitoring of intake/output   CVC necessity: Yes   IPAD offered: Not appropriate  D: De-escalation Antibx   No  Plan for the day   Attempt titrate off levo. Added midodrine to help.  Family/Goals of care/Code Status   Code Status: Full Code     No acute  events throughout day, VS and assessment per flow sheet, patient progressing towards goals as tolerated, plan of care reviewed with Radha Abbott and family, all concerns addressed, will continue to monitor.

## 2024-06-19 NOTE — PROGRESS NOTES
Rounded on patient at bedside. Patient is A&Ox 4.  LVAD history interrogated with no abnormal events noted.  LVAD numbers WNL compared to baseline. Pt denies any needs at this time.  Patient is oriented today and recognized me. Encouraged pt to notify nurse if they have any questions, problems or concerns for LVAD coordinator.  Pt verbalized understanding and in agreement of plan. Plan for team to continue to round on patient.

## 2024-06-19 NOTE — PT/OT/SLP PROGRESS
Speech Language Pathology Treatment    Patient Name:  Radha Abbott   MRN:  [id number]  Admitting Diagnosis: Acute renal failure superimposed on stage 4 chronic kidney disease    Recommendations:                 General Recommendations:  Dysphagia therapy  Diet recommendations:  Minced & Moist Diet - IDDSI Level 5, Liquid Diet Level: Thin liquids - IDDSI Level 0   Aspiration Precautions: Continue alternate means of nutrition   General Precautions: Standard,    Communication strategies:  none    Assessment:     Radha Abbott is a 64 y.o. male with an SLP diagnosis of Dysphagia further impacted by fluctuating alertness and cognitive status.   Subjective     Pt awake   Spouse at the bedside  RN in agreement with attempting at this time   Cardiac NP also present at the bedside     Pain/Comfort:  Pain Rating 1: 0/10  Pain Rating Post-Intervention 1: 0/10    Respiratory Status: Room air    Objective:     Has the patient been evaluated by SLP for swallowing?   Yes  Keep patient NPO? No     Pt seen in conjunction with PT and OT to optimize positioning for PO trials. Pt more alert compared to previous date. Pt also more communicative with single word and short phrase responses. Pt does continue present with confusion and overall flat affect. Pt consumed trials of thin liquids via single straw sips and then cyclic sips of thin liquids via straw across 3oz. No overt clinical signs of aspiration appreciated. Pt does exhibit hoarseness appearing related to generalized weakness vs any acute respiratory changes. Pt managed single bite of regular solid. Pt with significantly slow chewing pattern to manage solid warranting liquid wash to clear residue.  SLP discussed with pt, spouse and NP at the bedside recommending diet advancement to minced and moist solids paired with thin liquids. Pt remains with NG tube in place. SLP discussed continuing with NG tube feeds to ensure pt meeting/nutrition/hydration needs . NP discussed adding  nutritional supplemental beverages to assist with meeting needs by mouth. Speech service to continue to follow and to advance diet as appropriate.      Goals:   Multidisciplinary Problems       SLP Goals          Problem: SLP    Goal Priority Disciplines Outcome   SLP Goal     SLP    Description: Speech Language Pathology Goals  Goals expected to be met by    1. Pt will tolerate regular unrestricted diet without any overt clinical signs of aspiration                        Plan:     Patient to be seen:  4 x/week   Plan of Care expires:     Plan of Care reviewed with:  patient, spouse   SLP Follow-Up:  Yes       Discharge recommendations:      Barriers to Discharge:  None    Time Tracking:     SLP Treatment Date:   06/19/24  Speech Start Time:  0904  Speech Stop Time:  0921     Speech Total Time (min):  17 min    Billable Minutes: Treatment Swallowing Dysfunction 8 and Self Care/Home Management Training 9    06/19/2024

## 2024-06-19 NOTE — SUBJECTIVE & OBJECTIVE
Interval History: S/p UF yesterday with 2L removed. CVP this AM down to 5 with improvement of CXR. Mental status continues to improve. Passed speech eval for modified diet. Remains on intermittent Levophed. H&H slightly dropped again. Supplementing iron today. Midodrine also started. Will likely get HD tomorrow.         Continuous Infusions:   sodium chloride 0.9%   Intravenous Continuous   Stopped at 06/13/24 0117    dextrose 10 % in water (D10W)   Intravenous Continuous PRN        NORepinephrine bitartrate-D5W  0-3 mcg/kg/min Intravenous Continuous 5.9 mL/hr at 06/19/24 1356 0.02 mcg/kg/min at 06/19/24 1356     Scheduled Meds:   sodium chloride 0.9%   Intravenous Once    amiodarone  400 mg Oral Daily    atorvastatin  40 mg Oral QHS    epoetin zohaib (PROCRIT) injection  4,000 Units Intravenous Every Mon, Wed, Fri    ferumoxytoL (FERAHEME) 510 mg in dextrose 5 % (D5W) 117 mL IVPB  510 mg Intravenous Once    folic acid  1 mg Oral Daily    gabapentin  100 mg Oral BID    LIDOcaine  2 patch Transdermal Q24H    midodrine  10 mg Oral Q8H    pantoprazole  40 mg Oral Daily    piperacillin-tazobactam (Zosyn) IV (PEDS and ADULTS) (extended infusion is not appropriate)  4.5 g Intravenous Q12H    QUEtiapine  25 mg Oral QHS    venlafaxine  37.5 mg Oral Daily    vitamin D  1,000 Units Oral Daily    warfarin  2.5 mg Oral Daily     PRN Meds:  Current Facility-Administered Medications:     0.9%  NaCl infusion (for blood administration), , Intravenous, Q24H PRN    sodium chloride 0.9%, , Intravenous, PRN    acetaminophen, 650 mg, Oral, Q6H PRN    acetaminophen, 650 mg, Per NG tube, Q6H PRN    dextrose 10 % in water (D10W), , Intravenous, Continuous PRN    dextrose 10%, 12.5 g, Intravenous, PRN    dextrose 10%, 25 g, Intravenous, PRN    glucagon (human recombinant), 1 mg, Intramuscular, PRN    insulin aspart U-100, 0-5 Units, Subcutaneous, Q4H PRN    simethicone, 1 tablet, Oral, TID PRN    traZODone, 25 mg, Oral, Nightly PRN    Review  of patient's allergies indicates:  No Known Allergies  Objective:     Vital Signs (Most Recent):  Temp: 97.7 °F (36.5 °C) (06/19/24 1100)  Pulse: 86 (06/19/24 1100)  Resp: (!) 26 (06/19/24 1100)  BP: 103/66 (06/19/24 1100)  SpO2: 98 % (06/19/24 1100) Vital Signs (24h Range):  Temp:  [97.5 °F (36.4 °C)-98.9 °F (37.2 °C)] 97.7 °F (36.5 °C)  Pulse:  [31-89] 86  Resp:  [14-43] 26  SpO2:  [95 %-100 %] 98 %  BP: ()/(0-66) 103/66     Patient Vitals for the past 72 hrs (Last 3 readings):   Weight   06/19/24 0400 77.5 kg (170 lb 13.7 oz)   06/17/24 0501 78.6 kg (173 lb 4.5 oz)     Body mass index is 22.54 kg/m².      Intake/Output Summary (Last 24 hours) at 6/19/2024 1427  Last data filed at 6/19/2024 1100  Gross per 24 hour   Intake 1060.1 ml   Output 2560 ml   Net -1499.9 ml         Telemetry: SR       Physical Exam  Constitutional:       Appearance: Normal appearance.   HENT:      Head: Normocephalic and atraumatic.   Eyes:      Conjunctiva/sclera: Conjunctivae normal.      Pupils: Pupils are equal, round, and reactive to light.   Neck:      Comments: JVD visible   Cardiovascular:      Rate and Rhythm: Normal rate and regular rhythm.      Comments: Smooth VAD hum  Pulmonary:      Effort: Pulmonary effort is normal.      Breath sounds: Normal breath sounds.   Abdominal:      General: Bowel sounds are normal.      Palpations: Abdomen is soft.   Musculoskeletal:         General: No swelling. Normal range of motion.      Cervical back: Normal range of motion and neck supple.   Skin:     General: Skin is warm and dry.      Capillary Refill: Capillary refill takes 2 to 3 seconds.   Neurological:      General: No focal deficit present.      Mental Status: He is alert.      Comments: Oriented x2. Intermittent confusion/agitation             Significant Labs:  CBC:  Recent Labs   Lab 06/17/24  0319 06/18/24  0300 06/19/24  0312   WBC 12.21 10.67 8.66   RBC 2.37* 2.77* 2.42*   HGB 6.5* 7.7* 6.8*   HCT 20.9* 24.0* 20.9*   PLT  379 318 334   MCV 88 87 86   MCH 27.4 27.8 28.1   MCHC 31.1* 32.1 32.5     BNP:  Recent Labs   Lab 06/14/24  0221 06/17/24 0319 06/19/24 0312   BNP 1,542* 1,023* 823*     CMP:  Recent Labs   Lab 06/17/24 0319 06/18/24  0300 06/18/24  1816 06/19/24 0312   GLU 96 251* 182* 126*   CALCIUM 8.7 8.3* 8.8 8.3*   ALBUMIN 2.3*  --  2.4* 2.1*   PROT 7.0  --  7.8 7.2   * 128* 131* 131*   K 3.7 3.4* 4.1 3.7   CO2 22* 20* 18* 18*   CL 96 97 102 102   BUN 22 25* 17 32*   CREATININE 2.4* 2.5* 1.8* 2.7*   ALKPHOS 287*  --  381* 369*   *  --  196* 159*   *  --  136* 110*   BILITOT 0.7  --  0.8 0.5      Coagulation:   Recent Labs   Lab 06/17/24 0319 06/18/24  0300 06/19/24  0312   INR 1.8* 1.9* 2.2*   APTT 40.2* 40.3* 40.0*     LDH:  Recent Labs   Lab 06/17/24 0319 06/18/24  0300 06/19/24 0312   * 369* 402*     Microbiology:  Microbiology Results (last 7 days)       Procedure Component Value Units Date/Time    Culture, Respiratory with Gram Stain [[id number]]     Order Status: No result Specimen: Respiratory     Blood culture #1 **CANNOT BE ORDERED STAT** [[id number]] Collected: 06/11/24 2122    Order Status: Completed Specimen: Blood from Peripheral, Antecubital, Right Updated: 06/16/24 2212     Blood Culture, Routine No growth after 5 days.    Blood culture #2 **CANNOT BE ORDERED STAT** [[id number]] Collected: 06/11/24 2121    Order Status: Completed Specimen: Blood from Peripheral, Hand, Left Updated: 06/16/24 2212     Blood Culture, Routine No growth after 5 days.    Urine culture [[id number]] Collected: 06/12/24 1422    Order Status: Completed Specimen: Urine Updated: 06/13/24 2113     Urine Culture, Routine No growth    Narrative:      Specimen Source->Urine            I have reviewed all pertinent labs within the past 24 hours.    Estimated Creatinine Clearance: 31.1 mL/min (A) (based on SCr of 2.7 mg/dL (H)).    Diagnostic Results:  I have reviewed and interpreted all pertinent imaging  results/findings within the past 24 hours.

## 2024-06-20 LAB
ALLENS TEST: ABNORMAL
ALLENS TEST: ABNORMAL
ANION GAP SERPL CALC-SCNC: 13 MMOL/L (ref 8–16)
APTT PPP: 42.4 SEC (ref 21–32)
BASOPHILS # BLD AUTO: 0.03 K/UL (ref 0–0.2)
BASOPHILS NFR BLD: 0.4 % (ref 0–1.9)
BUN SERPL-MCNC: 64 MG/DL (ref 8–23)
CALCIUM SERPL-MCNC: 8.7 MG/DL (ref 8.7–10.5)
CHLORIDE SERPL-SCNC: 103 MMOL/L (ref 95–110)
CO2 SERPL-SCNC: 17 MMOL/L (ref 23–29)
CREAT SERPL-MCNC: 4.5 MG/DL (ref 0.5–1.4)
DELSYS: ABNORMAL
DELSYS: ABNORMAL
DIFFERENTIAL METHOD BLD: ABNORMAL
EOSINOPHIL # BLD AUTO: 0.3 K/UL (ref 0–0.5)
EOSINOPHIL NFR BLD: 3.7 % (ref 0–8)
ERYTHROCYTE [DISTWIDTH] IN BLOOD BY AUTOMATED COUNT: 17.4 % (ref 11.5–14.5)
ERYTHROCYTE [SEDIMENTATION RATE] IN BLOOD BY WESTERGREN METHOD: 15 MM/H
EST. GFR  (NO RACE VARIABLE): 14 ML/MIN/1.73 M^2
FIO2: 21
GLUCOSE SERPL-MCNC: 171 MG/DL (ref 70–110)
HCO3 UR-SCNC: 18.5 MMOL/L (ref 24–28)
HCT VFR BLD AUTO: 24.7 % (ref 40–54)
HGB BLD-MCNC: 8.1 G/DL (ref 14–18)
IMM GRANULOCYTES # BLD AUTO: 0.06 K/UL (ref 0–0.04)
IMM GRANULOCYTES NFR BLD AUTO: 0.9 % (ref 0–0.5)
INR PPP: 2.4 (ref 0.8–1.2)
LDH SERPL L TO P-CCNC: 372 U/L (ref 110–260)
LYMPHOCYTES # BLD AUTO: 0.4 K/UL (ref 1–4.8)
LYMPHOCYTES NFR BLD: 5.8 % (ref 18–48)
MAGNESIUM SERPL-MCNC: 2.5 MG/DL (ref 1.6–2.6)
MCH RBC QN AUTO: 27.9 PG (ref 27–31)
MCHC RBC AUTO-ENTMCNC: 32.8 G/DL (ref 32–36)
MCV RBC AUTO: 85 FL (ref 82–98)
MODE: ABNORMAL
MODE: ABNORMAL
MONOCYTES # BLD AUTO: 0.7 K/UL (ref 0.3–1)
MONOCYTES NFR BLD: 9.5 % (ref 4–15)
NEUTROPHILS # BLD AUTO: 5.6 K/UL (ref 1.8–7.7)
NEUTROPHILS NFR BLD: 79.7 % (ref 38–73)
NRBC BLD-RTO: 0 /100 WBC
PCO2 BLDA: 33.2 MMHG (ref 35–45)
PH SMN: 7.35 [PH] (ref 7.35–7.45)
PHOSPHATE SERPL-MCNC: 1.4 MG/DL (ref 2.7–4.5)
PLATELET # BLD AUTO: 282 K/UL (ref 150–450)
PMV BLD AUTO: 9.4 FL (ref 9.2–12.9)
PO2 BLDA: 26 MMHG (ref 40–60)
PO2 BLDA: 29 MMHG (ref 40–60)
POC BE: -7 MMOL/L
POC SATURATED O2: 45 % (ref 95–100)
POC SATURATED O2: 50 % (ref 95–100)
POC TCO2: 20 MMOL/L (ref 24–29)
POCT GLUCOSE: 173 MG/DL (ref 70–110)
POCT GLUCOSE: 183 MG/DL (ref 70–110)
POCT GLUCOSE: 208 MG/DL (ref 70–110)
POCT GLUCOSE: 255 MG/DL (ref 70–110)
POCT GLUCOSE: 274 MG/DL (ref 70–110)
POCT GLUCOSE: 348 MG/DL (ref 70–110)
POTASSIUM SERPL-SCNC: 3.7 MMOL/L (ref 3.5–5.1)
PROTHROMBIN TIME: 25.3 SEC (ref 9–12.5)
RBC # BLD AUTO: 2.9 M/UL (ref 4.6–6.2)
SAMPLE: ABNORMAL
SAMPLE: ABNORMAL
SITE: ABNORMAL
SITE: ABNORMAL
SODIUM SERPL-SCNC: 133 MMOL/L (ref 136–145)
WBC # BLD AUTO: 7.05 K/UL (ref 3.9–12.7)

## 2024-06-20 PROCEDURE — 92526 ORAL FUNCTION THERAPY: CPT

## 2024-06-20 PROCEDURE — 99900035 HC TECH TIME PER 15 MIN (STAT)

## 2024-06-20 PROCEDURE — 97535 SELF CARE MNGMENT TRAINING: CPT

## 2024-06-20 PROCEDURE — 83615 LACTATE (LD) (LDH) ENZYME: CPT | Performed by: STUDENT IN AN ORGANIZED HEALTH CARE EDUCATION/TRAINING PROGRAM

## 2024-06-20 PROCEDURE — 25000003 PHARM REV CODE 250: Performed by: INTERNAL MEDICINE

## 2024-06-20 PROCEDURE — 97530 THERAPEUTIC ACTIVITIES: CPT

## 2024-06-20 PROCEDURE — 83735 ASSAY OF MAGNESIUM: CPT | Performed by: STUDENT IN AN ORGANIZED HEALTH CARE EDUCATION/TRAINING PROGRAM

## 2024-06-20 PROCEDURE — 97110 THERAPEUTIC EXERCISES: CPT

## 2024-06-20 PROCEDURE — 25000003 PHARM REV CODE 250: Performed by: NURSE PRACTITIONER

## 2024-06-20 PROCEDURE — 63600175 PHARM REV CODE 636 W HCPCS: Performed by: INTERNAL MEDICINE

## 2024-06-20 PROCEDURE — 80048 BASIC METABOLIC PNL TOTAL CA: CPT | Performed by: STUDENT IN AN ORGANIZED HEALTH CARE EDUCATION/TRAINING PROGRAM

## 2024-06-20 PROCEDURE — 99232 SBSQ HOSP IP/OBS MODERATE 35: CPT | Mod: ,,, | Performed by: NURSE PRACTITIONER

## 2024-06-20 PROCEDURE — 85025 COMPLETE CBC W/AUTO DIFF WBC: CPT | Performed by: STUDENT IN AN ORGANIZED HEALTH CARE EDUCATION/TRAINING PROGRAM

## 2024-06-20 PROCEDURE — 85610 PROTHROMBIN TIME: CPT | Performed by: STUDENT IN AN ORGANIZED HEALTH CARE EDUCATION/TRAINING PROGRAM

## 2024-06-20 PROCEDURE — 84100 ASSAY OF PHOSPHORUS: CPT | Performed by: STUDENT IN AN ORGANIZED HEALTH CARE EDUCATION/TRAINING PROGRAM

## 2024-06-20 PROCEDURE — 97112 NEUROMUSCULAR REEDUCATION: CPT

## 2024-06-20 PROCEDURE — 99233 SBSQ HOSP IP/OBS HIGH 50: CPT | Mod: ,,, | Performed by: HOSPITALIST

## 2024-06-20 PROCEDURE — 99291 CRITICAL CARE FIRST HOUR: CPT | Mod: ,,, | Performed by: REGISTERED NURSE

## 2024-06-20 PROCEDURE — 99292 CRITICAL CARE ADDL 30 MIN: CPT | Mod: ,,, | Performed by: INTERNAL MEDICINE

## 2024-06-20 PROCEDURE — 93750 INTERROGATION VAD IN PERSON: CPT | Mod: ,,, | Performed by: INTERNAL MEDICINE

## 2024-06-20 PROCEDURE — 63600175 PHARM REV CODE 636 W HCPCS: Performed by: NURSE PRACTITIONER

## 2024-06-20 PROCEDURE — 85730 THROMBOPLASTIN TIME PARTIAL: CPT | Performed by: STUDENT IN AN ORGANIZED HEALTH CARE EDUCATION/TRAINING PROGRAM

## 2024-06-20 PROCEDURE — 82803 BLOOD GASES ANY COMBINATION: CPT

## 2024-06-20 PROCEDURE — 21400001 HC TELEMETRY ROOM

## 2024-06-20 PROCEDURE — 94761 N-INVAS EAR/PLS OXIMETRY MLT: CPT

## 2024-06-20 PROCEDURE — 25000003 PHARM REV CODE 250: Performed by: STUDENT IN AN ORGANIZED HEALTH CARE EDUCATION/TRAINING PROGRAM

## 2024-06-20 PROCEDURE — 25000003 PHARM REV CODE 250: Performed by: REGISTERED NURSE

## 2024-06-20 PROCEDURE — 27000248 HC VAD-ADDITIONAL DAY

## 2024-06-20 PROCEDURE — 94799 UNLISTED PULMONARY SVC/PX: CPT

## 2024-06-20 PROCEDURE — 80100014 HC HEMODIALYSIS 1:1

## 2024-06-20 RX ORDER — AMOXICILLIN 400 MG/5ML
500 POWDER, FOR SUSPENSION ORAL EVERY 12 HOURS
Status: DISCONTINUED | OUTPATIENT
Start: 2024-06-20 | End: 2024-06-22

## 2024-06-20 RX ORDER — DEXTROSE MONOHYDRATE 100 MG/ML
INJECTION, SOLUTION INTRAVENOUS CONTINUOUS PRN
Status: DISCONTINUED | OUTPATIENT
Start: 2024-06-20 | End: 2024-06-23

## 2024-06-20 RX ORDER — NOREPINEPHRINE BITARTRATE/D5W 4MG/250ML
0-3 PLASTIC BAG, INJECTION (ML) INTRAVENOUS CONTINUOUS
Status: CANCELLED | OUTPATIENT
Start: 2024-06-20

## 2024-06-20 RX ORDER — GLUCAGON 1 MG
1 KIT INJECTION
Status: DISCONTINUED | OUTPATIENT
Start: 2024-06-20 | End: 2024-06-23

## 2024-06-20 RX ORDER — INSULIN ASPART 100 [IU]/ML
0-10 INJECTION, SOLUTION INTRAVENOUS; SUBCUTANEOUS EVERY 4 HOURS PRN
Status: DISCONTINUED | OUTPATIENT
Start: 2024-06-20 | End: 2024-06-21

## 2024-06-20 RX ADMIN — INSULIN ASPART 2 UNITS: 100 INJECTION, SOLUTION INTRAVENOUS; SUBCUTANEOUS at 05:06

## 2024-06-20 RX ADMIN — QUETIAPINE FUMARATE 50 MG: 25 TABLET ORAL at 09:06

## 2024-06-20 RX ADMIN — AMIODARONE HYDROCHLORIDE 400 MG: 200 TABLET ORAL at 09:06

## 2024-06-20 RX ADMIN — AMOXICILLIN 500 MG: 400 POWDER, FOR SUSPENSION ORAL at 12:06

## 2024-06-20 RX ADMIN — PIPERACILLIN SODIUM AND TAZOBACTAM SODIUM 4.5 G: 4; .5 INJECTION, POWDER, FOR SOLUTION INTRAVENOUS at 02:06

## 2024-06-20 RX ADMIN — MIDODRINE HYDROCHLORIDE 10 MG: 5 TABLET ORAL at 05:06

## 2024-06-20 RX ADMIN — VENLAFAXINE 37.5 MG: 37.5 TABLET ORAL at 09:06

## 2024-06-20 RX ADMIN — MIDODRINE HYDROCHLORIDE 10 MG: 5 TABLET ORAL at 09:06

## 2024-06-20 RX ADMIN — GABAPENTIN 100 MG: 100 CAPSULE ORAL at 09:06

## 2024-06-20 RX ADMIN — WARFARIN SODIUM 2.5 MG: 2.5 TABLET ORAL at 05:06

## 2024-06-20 RX ADMIN — PANTOPRAZOLE SODIUM 40 MG: 40 TABLET, DELAYED RELEASE ORAL at 09:06

## 2024-06-20 RX ADMIN — ATORVASTATIN CALCIUM 40 MG: 40 TABLET, FILM COATED ORAL at 09:06

## 2024-06-20 RX ADMIN — INSULIN ASPART 3 UNITS: 100 INJECTION, SOLUTION INTRAVENOUS; SUBCUTANEOUS at 09:06

## 2024-06-20 RX ADMIN — AMOXICILLIN 500 MG: 400 POWDER, FOR SUSPENSION ORAL at 09:06

## 2024-06-20 RX ADMIN — INSULIN ASPART 8 UNITS: 100 INJECTION, SOLUTION INTRAVENOUS; SUBCUTANEOUS at 12:06

## 2024-06-20 RX ADMIN — FOLIC ACID 1 MG: 1 TABLET ORAL at 09:06

## 2024-06-20 RX ADMIN — MIDODRINE HYDROCHLORIDE 10 MG: 5 TABLET ORAL at 02:06

## 2024-06-20 RX ADMIN — CHOLECALCIFEROL TAB 25 MCG (1000 UNIT) 1000 UNITS: 25 TAB at 09:06

## 2024-06-20 NOTE — PT/OT/SLP PROGRESS
Occupational Therapy   Co-Treatment    Name: Radha Abbott  MRN: [id number]  Admitting Diagnosis:  Acute renal failure superimposed on stage 4 chronic kidney disease       Recommendations:     Discharge Recommendations: High Intensity Therapy  Discharge Equipment Recommendations:  hospital bed, wheelchair  Barriers to discharge:  None    Assessment:     Radha Abbott is a 64 y.o. male with a medical diagnosis of Acute renal failure superimposed on stage 4 chronic kidney disease.  He presents with impaired ADL and mobility performance deficits. Pt found upright in bed and agreeable for therapy. Session focused on EOB exercises, standing trial, and trunk control strategies. Pt did display one episode of presumed orthostatic hypotension with prolonged EOB ax which pt reported acute onset of weakness with R lateral lean. VS not measured 2/2 pt's inconsistent BP (unclear if pt is pulsatile with RN notified). Pt remained on LVAD wall power today. Patient has demonstrated sufficient progression to warrant high intensity therapy evidenced by objectives noted below.   Performance deficits affecting function are weakness, gait instability, decreased upper extremity function, impaired endurance, impaired balance, decreased lower extremity function, impaired self care skills, impaired functional mobility, decreased coordination, decreased safety awareness.     Rehab Prognosis:  Good; patient would benefit from acute skilled OT services to address these deficits and reach maximum level of function.       Plan:     Patient to be seen 4 x/week to address the above listed problems via self-care/home management, therapeutic exercises, therapeutic activities, neuromuscular re-education  Plan of Care Expires: 07/14/24  Plan of Care Reviewed with: patient    Subjective     Chief Complaint: feeling weak after ~9 minutes at EOB   Patient/Family Comments/goals: return home   Pain/Comfort:  Pain Rating 1: 0/10  Pain Rating Post-Intervention 1:  0/10    Objective:     Communicated with: RN prior to session.  Patient found HOB elevated with telemetry, blood pressure cuff, NG tube, LVAD, pressure relief boots upon OT entry to room.    General Precautions: Standard, fall, LVAD    Orthopedic Precautions:N/A  Braces: N/A  Respiratory Status: Room air     Occupational Performance:     Bed Mobility:    Patient completed Rolling/Turning to Right with moderate assistance  Patient completed Scooting/Bridging with moderate assistance  Patient completed Supine to Sit with moderate assistance  Patient completed Sit to Supine with moderate assistance     Functional Mobility/Transfers:  Patient completed Sit <> Stand Transfer with minimum assistance and of 2 persons  with  hand-held assist   Functional Mobility: Pt stood with min A x2 with HHA. Pt tolerated standing ~1.5 minutes. Prior to this, pt sat EOB ~9 minutes with SBA-mod A. Mod A needed 2/2 acute weakness noted.  At EOB pt completed UB exercises and brief grooming. Pt completed bicep curls 1x10 and shoulder flexion 1x5     Activities of Daily Living:  Grooming: stand by assistance cleaning underneath nose at EOB      [facility] 6 Click ADL: 12    Treatment & Education:  Pt educated on role of occupational therapy, POC, and safety during ADLs and functional mobility. Pt and OT discussed importance of safe, continued mobility to optimize daily living skills. Pt verbalized understanding.     White board updated during session. Pt given instruction to call for medical staff/nurse for assistance.       Patient left HOB elevated with all lines intact, call button in reach, and RN notified    GOALS:   Multidisciplinary Problems       Occupational Therapy Goals          Problem: Occupational Therapy    Goal Priority Disciplines Outcome Interventions   Occupational Therapy Goal     OT, PT/OT Progressing    Description: Goals to be met by: 7/14/24     Patient will increase functional independence with ADLs by performing:    UE  Dressing with Set-up Assistance.  LE Dressing with Minimal Assistance.  Grooming while standing at sink with Stand-by Assistance.  Toileting from toilet/bedside commode with Minimal Assistance for hygiene and clothing management.   Supine to sit with Stand-by Assistance.  Step transfer with Contact Guard Assistance  Toilet transfer to toilet/bedside commode with Contact Guard Assistance.                         Time Tracking:     OT Date of Treatment: 06/20/24  OT Start Time: 0816  OT Stop Time: 0842  OT Total Time (min): 26 min    Billable Minutes:Self Care/Home Management 10 mn  Therapeutic Exercise 16 min    OT/PRIETO: OT          6/20/2024

## 2024-06-20 NOTE — PROGRESS NOTES
06/20/2024  [place]    Current provider:  Sajan Hurley, *    Device interrogation:      6/20/2024    11:00 AM 6/20/2024    10:00 AM 6/20/2024     9:00 AM 6/20/2024     8:00 AM 6/20/2024     7:01 AM 6/20/2024     6:01 AM 6/20/2024     5:01 AM   TXP LVAD INTERROGATIONS   Type HeartMate3 HeartMate3 HeartMate3 HeartMate3 HeartMate3 HeartMate3 HeartMate3   Flow 4.3 4.3 4.4 4.2 4.3 4.3 4.4   Speed 5000 5050 5000 5000 5000 5000 5000   PI 3.5 3.4 3.2 3.6 3.1 3.4 3.3   Power (Carrington) 3.4 3.4 3.5 3.4 3.4 3.4 3.5   LSL 4600 4600 4600 4600 4600 4600 4600   Pulsatility Intermittent pulse Intermittent pulse Intermittent pulse Intermittent pulse Intermittent pulse Intermittent pulse Intermittent pulse          Rounded on [facility] to ensure all mechanical assist device settings (IABP or VAD) were appropriate and all parameters were within limits.  I was able to ensure all back up equipment was present, the staff had no issues, and the Perfusion Department daily rounding was complete.      For implantable VADs: Interrogation of Ventricular assist device was performed with analysis of device parameters and review of device function. I have personally reviewed the interrogation findings and agree with findings as stated.     In emergency, the nursing units have been notified to contact the perfusion department either by:  Calling [phone] from 630am to 4pm Mon thru Fri, utilizing the On-Call Finder functionality of Epic and searching for Perfusion, or by contacting the hospital  from 4pm to 630am and on weekends and asking to speak with the perfusionist on call.    12:50 PM

## 2024-06-20 NOTE — ASSESSMENT & PLAN NOTE
Chronic with no acute bleeding, multifactorial 2/2 CKD4 and chronic anticoagulation  -Iron panel done. Iron deficient but Ferritin is elevated at 1186  -Homocysteine normal and methylmalonic pending   -s/p Epo   -Replaced IV iron   -Continue to trend CBC's

## 2024-06-20 NOTE — PT/OT/SLP PROGRESS
Physical Therapy  Co-Treatment with OT    Patient Name:  Radha Abbott   MRN:  [id number]    Recommendations:     Discharge Recommendations: High Intensity Therapy  Discharge Equipment Recommendations:  (will determine DME needs closer to discharge)  Barriers to discharge: Decreased caregiver support    Assessment:     Radha Abbott is a 64 y.o. male admitted with a medical diagnosis of Acute renal failure superimposed on stage 4 chronic kidney disease.  He presents with the following impairments/functional limitations: weakness, gait instability, impaired endurance, impaired balance, decreased lower extremity function, impaired functional mobility, decreased coordination, decreased safety awareness pt tolerated treatment better being able to begin standing for pre-gait activities. Pt will benefit from cont skilled PT 4x/wk to progress physically.  Pt is significantly below previous functional level, increased risk of falls and increased burden of care currently. Patient has demonstrated sufficient progression to warrant high intensity therapy evidenced by objectives noted below. Pt is s/p LVAD placement 12/2023.    Rehab Prognosis: Good; patient would benefit from acute skilled PT services to address these deficits and reach maximum level of function.    Recent Surgery: * No surgery found *      Plan:     During this hospitalization, patient to be seen 4 x/week to address the identified rehab impairments via gait training, therapeutic activities, therapeutic exercises, neuromuscular re-education and progress toward the following goals:    Plan of Care Expires:  07/14/24    Subjective     Chief Complaint: pt c/o feeling weak during treatment and leaned to R side with sitting EOB.   Patient/Family Comments/goals: to get better and go home.   Pain/Comfort:  Pain Rating 1: 0/10  Pain Rating Post-Intervention 1: 0/10      Objective:     Communicated with nurse  prior to session.  Patient found supine with telemetry, blood  pressure cuff, NG tube, LVAD (CVP, R IJ dialysis catheter, B Barry Eliezer boots) upon PT entry to room.     General Precautions: Standard, fall, LVAD  Orthopedic Precautions: N/A  Braces: N/A  Respiratory Status: Room air     Functional Mobility:  Bed Mobility:  pt needed verbal cues for hand placement and sequencing for functional mobility.    Rolling Right: moderate assistance  Supine to Sit: moderate assistance  Sit to Supine: moderate assistance    Transfers:     Sit to Stand:  minimum assistance and of 2 persons with hand-held assist    Balance: pt sat on EOB x 9 min with SBA to CGA. Pt c/o feeling weak and leaned to R side. Pt was moderate assist to weight shift  to midline sitting. Pt was mod assist sitting balance thereafter. Pt was returned to supine in bed and RN verbally told of such.     Standing balance. Pt stood x 1 min 30 sec with min assist x 2 for standing balance.       AM-PAC 6 CLICK MOBILITY  Turning over in bed (including adjusting bedclothes, sheets and blankets)?: 3  Sitting down on and standing up from a chair with arms (e.g., wheelchair, bedside commode, etc.): 2  Moving from lying on back to sitting on the side of the bed?: 2  Moving to and from a bed to a chair (including a wheelchair)?: 2  Need to walk in hospital room?: 1  Climbing 3-5 steps with a railing?: 1  Basic Mobility Total Score: 11       Treatment & Education:  Pt received verbal instructions in PT POC and verbally expressed understanding of such.     Patient left supine with all lines intact, call button in reach, and RN  notified.. Pt has dialysis scheduled this AM.     GOALS:   Multidisciplinary Problems       Physical Therapy Goals          Problem: Physical Therapy    Goal Priority Disciplines Outcome Goal Variances Interventions   Physical Therapy Goal     PT, PT/OT Progressing     Description: Goals to be completed by: 7/14/24    1.Pt will perform sup<>sit transfers w/ contact guard assistance  2.Pt will have sufficient  dynamic balance to sit EOB while performing ADLs/therex w/ supervision  3.Pt will be able to stand up from EOB w/ contact guard assistance using LRAD  4.Pt will ambulate 100 feet w/ minimum assistance using LRAD  5.Pt will be independent w/ HEP therex on BLE w/ good form and ROM                        Time Tracking:     PT Received On: 06/20/24  PT Start Time: 0816     PT Stop Time: 0842  PT Total Time (min): 26 min     Billable Minutes: Therapeutic Activity 16 min  and Neuromuscular Re-education 10 min        PT/PTA: PT     Number of PTA visits since last PT visit: 0     06/20/2024

## 2024-06-20 NOTE — PROGRESS NOTES
[facility] - Cardiac Intensive Care  Heart Transplant  Progress Note    Patient Name: Radha Abbott  MRN: [id number]  Admission Date: 6/11/2024  Hospital Length of Stay: 9 days  Attending Physician: Sajan Hurley, *  Primary Care Provider: Vasu Kong MD  Principal Problem:Acute renal failure superimposed on stage 4 chronic kidney disease    Subjective:   Interval History: No acute events overnight. Midodrine started yesterday and weaned off Levophed. H&H improved today. Awake and oriented today. Stood at bedside with PT/OT. Tolerating tube feeds and modified diet ordered. Hopefully can remove NG tube tomorrow. Will get HD today with 2L fluid removal goal.         Continuous Infusions:   sodium chloride 0.9%   Intravenous Continuous   Stopped at 06/13/24 0117    dextrose 10 % in water (D10W)   Intravenous Continuous PRN         Scheduled Meds:   sodium chloride 0.9%   Intravenous Once    amiodarone  400 mg Oral Daily    amoxicillin  500 mg Per NG tube Q12H    atorvastatin  40 mg Oral QHS    epoetin zohaib (PROCRIT) injection  4,000 Units Intravenous Every Mon, Wed, Fri    folic acid  1 mg Oral Daily    gabapentin  100 mg Oral BID    LIDOcaine  2 patch Transdermal Q24H    midodrine  10 mg Oral Q8H    pantoprazole  40 mg Oral Daily    QUEtiapine  50 mg Oral QHS    venlafaxine  37.5 mg Oral Daily    vitamin D  1,000 Units Oral Daily    warfarin  2.5 mg Oral Daily     PRN Meds:  Current Facility-Administered Medications:     0.9%  NaCl infusion (for blood administration), , Intravenous, Q24H PRN    sodium chloride 0.9%, , Intravenous, PRN    acetaminophen, 650 mg, Oral, Q6H PRN    acetaminophen, 650 mg, Per NG tube, Q6H PRN    dextrose 10 % in water (D10W), , Intravenous, Continuous PRN    dextrose 10%, 12.5 g, Intravenous, PRN    dextrose 10%, 25 g, Intravenous, PRN    glucagon (human recombinant), 1 mg, Intramuscular, PRN    insulin aspart U-100, 0-10 Units, Subcutaneous, Q4H PRN    simethicone, 1 tablet,  Oral, TID PRN    traZODone, 25 mg, Oral, Nightly PRN    Review of patient's allergies indicates:  No Known Allergies  Objective:     Vital Signs (Most Recent):  Temp: 98.5 °F (36.9 °C) (06/20/24 0701)  Pulse: 90 (06/20/24 1000)  Resp: (!) 35 (06/20/24 1000)  BP: (!) 76/0 (06/20/24 1000)  SpO2: 99 % (06/20/24 0900) Vital Signs (24h Range):  Temp:  [98 °F (36.7 °C)-98.5 °F (36.9 °C)] 98.5 °F (36.9 °C)  Pulse:  [82-92] 90  Resp:  [12-51] 35  SpO2:  [92 %-99 %] 99 %  BP: ()/(0-80) 76/0     Patient Vitals for the past 72 hrs (Last 3 readings):   Weight   06/20/24 0601 76.3 kg (168 lb 3.4 oz)   06/19/24 0400 77.5 kg (170 lb 13.7 oz)     Body mass index is 22.19 kg/m².      Intake/Output Summary (Last 24 hours) at 6/20/2024 1227  Last data filed at 6/20/2024 1006  Gross per 24 hour   Intake 1847.66 ml   Output 0 ml   Net 1847.66 ml         Telemetry: SR       Physical Exam  Constitutional:       Appearance: Normal appearance.   HENT:      Head: Normocephalic and atraumatic.   Eyes:      Conjunctiva/sclera: Conjunctivae normal.      Pupils: Pupils are equal, round, and reactive to light.   Neck:      Comments: No JVD   Cardiovascular:      Rate and Rhythm: Normal rate and regular rhythm.      Comments: Smooth VAD hum  Pulmonary:      Effort: Pulmonary effort is normal.      Breath sounds: Normal breath sounds.   Abdominal:      General: Bowel sounds are normal.      Palpations: Abdomen is soft.   Musculoskeletal:         General: No swelling. Normal range of motion.      Cervical back: Normal range of motion and neck supple.   Skin:     General: Skin is warm and dry.      Capillary Refill: Capillary refill takes 2 to 3 seconds.   Neurological:      General: No focal deficit present.      Mental Status: He is alert. Mental status is at baseline.      Comments: Oriented x3            Significant Labs:  CBC:  Recent Labs   Lab 06/18/24  0300 06/19/24  0312 06/20/24  0308   WBC 10.67 8.66 7.05   RBC 2.77* 2.42* 2.90*    HGB 7.7* 6.8* 8.1*   HCT 24.0* 20.9* 24.7*    334 282   MCV 87 86 85   MCH 27.8 28.1 27.9   MCHC 32.1 32.5 32.8     BNP:  Recent Labs   Lab 06/14/24  0221 06/17/24 0319 06/19/24 0312   BNP 1,542* 1,023* 823*     CMP:  Recent Labs   Lab 06/17/24 0319 06/18/24 0300 06/18/24  1816 06/19/24 0312 06/20/24  0308   GLU 96   < > 182* 126* 171*   CALCIUM 8.7   < > 8.8 8.3* 8.7   ALBUMIN 2.3*  --  2.4* 2.1*  --    PROT 7.0  --  7.8 7.2  --    *   < > 131* 131* 133*   K 3.7   < > 4.1 3.7 3.7   CO2 22*   < > 18* 18* 17*   CL 96   < > 102 102 103   BUN 22   < > 17 32* 64*   CREATININE 2.4*   < > 1.8* 2.7* 4.5*   ALKPHOS 287*  --  381* 369*  --    *  --  196* 159*  --    *  --  136* 110*  --    BILITOT 0.7  --  0.8 0.5  --     < > = values in this interval not displayed.      Coagulation:   Recent Labs   Lab 06/18/24 0300 06/19/24 0312 06/20/24 0308   INR 1.9* 2.2* 2.4*   APTT 40.3* 40.0* 42.4*     LDH:  Recent Labs   Lab 06/18/24 0300 06/19/24 0312 06/20/24  0308   * 402* 372*     Microbiology:  Microbiology Results (last 7 days)       Procedure Component Value Units Date/Time    Culture, Respiratory with Gram Stain [[id number]]     Order Status: No result Specimen: Respiratory     Blood culture #1 **CANNOT BE ORDERED STAT** [[id number]] Collected: 06/11/24 2122    Order Status: Completed Specimen: Blood from Peripheral, Antecubital, Right Updated: 06/16/24 2212     Blood Culture, Routine No growth after 5 days.    Blood culture #2 **CANNOT BE ORDERED STAT** [[id number]] Collected: 06/11/24 2121    Order Status: Completed Specimen: Blood from Peripheral, Hand, Left Updated: 06/16/24 2212     Blood Culture, Routine No growth after 5 days.    Urine culture [[id number]] Collected: 06/12/24 1422    Order Status: Completed Specimen: Urine Updated: 06/13/24 2113     Urine Culture, Routine No growth    Narrative:      Specimen Source->Urine            I have reviewed all pertinent labs  within the past 24 hours.    Estimated Creatinine Clearance: 18.4 mL/min (A) (based on SCr of 4.5 mg/dL (H)).    Diagnostic Results:  I have reviewed and interpreted all pertinent imaging results/findings within the past 24 hours.  Assessment and Plan:     Mr. Radha Abbott is a 63 yo male with stage D HFrEF, ICMP who underwent HM3 placement as DT (12/1/23) w/ a hospital stay complicated by vasoplegia(requiring Giaprezza), debility, malnutrition/impaired swallowing, and renal failure requiring HD (since weaned off) w/ recent discharge for ADHF presented to the ED with wife due to c/o worsening generalized weakness since 6/10. Per wife patient was mostly sleeping during the day despite sleeping 10 hrs at night. Patient also admits decreased appetite and decreased urine output despite taking his medications as prescribed. He has not urinated since 6/11 at 2pm. He has chronic shortness of breath on exertion. Denies confusion, syncope, diarrhea, constipation, N/V, dysuria, or other complaints. Patient had his prior tunnel cath removed used for HD about 2 weeks ago. He completed a 6 week therapy for Enterococcus bacteremia with ampicillin and rocephin ending 5/30/24a and was switched to PO antibiotics.     On prior hospital stay nephrology consulted during hospital stay due to elevated renal function. They feel he is close to being a dialysis patient. He diuresed on IV Bumex with prn Metolazone. He is net negative 1.2L throughout his hospital stay and weight on day of discharge was 179lbs down from 186lbs on admit. We continued his home dose of Bumex 4mg BID but increased his prn Metolazone to 10mg. He received a dose of Epo while inpatient and he has plans to follow up with Heme/Onc to get outpatient therapy arranged.      2D Echo with CFD done on 3/26/2024  LVAD: There is a Heartmate III LVAD running at 5000 RPM. The aortic valve does not open. The ventricular septum is at midline. Inflow cannula well seated at the apex.  Outflow graft not visualized.   Left Ventricle: The left ventricle is moderately dilated. Normal wall thickness. Global hypokinesis present. Septal motion is abnormal. There is severely reduced systolic function with a visually estimated ejection fraction of 5 - 10%. There is diastolic dysfunction but grade cannot be determined.   Right Ventricle: Mild right ventricular enlargement. Wall thickness is normal. Right ventricle wall motion is normal. Systolic function is normal. Pacemaker lead present in the ventricle.   Left Atrium: Left atrium is severely dilated.   Right Atrium: Right atrium is moderately dilated.   Mitral Valve: The mitral valve is repaired with an Noble stitch. There is mild regurgitation.   IVC/SVC: Normal venous pressure at 3 mmHg.              * Acute renal failure superimposed on stage 4 chronic kidney disease  Mr. Radha Abbott is a 65 yo male with stage D HFrEF, ICMP who underwent HM3 placement as DT, DM2, CKD4, HTN, E.faecalis bacteremia who presented due to c/o worsening generalized weakness since 6/10. Admitted for worsening renal function with a BUN>100/Cr. 9.9.     - CT Ab/pel: Kidneys/ Ureters: Normal in size and location.  Mild bilateral perinephric stranding, a nonspecific finding and similar to prior.  No hydronephrosis or nephrolithiasis. No ureteral dilatation.  - Nephrology consulted, appreciate recs.   - HD per nephro   - Will need OP HD upon discharge, recommend CM/SW involvement to start workup.  - Will need IR evaluation once out of ICU for TDC placement.  - Renally dosing medications      LVAD (left ventricular assist device) present  -HeartMate 3 Implanted  12/1/2023  as DT  -Cont Coumadin, dosing by PharmD.  Goal INR 2.0-3.0. therapeutic today.   -Antiplatelets Not on ASA  -LDH is stable overall today. Will continue to monitor daily.  -Speed set at  5000     -Interrogation notable for no events  -Not listed for OHTx, declined for OHTX due to comorbidities     Procedure:  Device Interrogation Including analysis of device parameters  Current Settings: Ventricular Assist Device  Review of device function is stable        6/20/2024    11:00 AM 6/20/2024    10:00 AM 6/20/2024     9:00 AM 6/20/2024     8:00 AM 6/20/2024     7:01 AM 6/20/2024     6:01 AM 6/20/2024     5:01 AM   TXP LVAD INTERROGATIONS   Type HeartMate3 HeartMate3 HeartMate3 HeartMate3 HeartMate3 HeartMate3 HeartMate3   Flow 4.3 4.3 4.4 4.2 4.3 4.3 4.4   Speed 5000 5050 5000 5000 5000 5000 5000   PI 3.5 3.4 3.2 3.6 3.1 3.4 3.3   Power ([place]rington) 3.4 3.4 3.5 3.4 3.4 3.4 3.5   LSL 4600 4600 4600 4600 4600 4600 4600   Pulsatility Intermittent pulse Intermittent pulse Intermittent pulse Intermittent pulse Intermittent pulse Intermittent pulse Intermittent pulse         Aspiration pneumonia of both lungs  -CT chest and CXR concerning for pneumonia   -tolerating room air   - de-escalate to Amoxicillin     Delirium  -Resolved   -CT head-toe neg.   -Continue Seroquel.       Bacteremia due to Enterococcus  He completed a 6 week therapy for Enterococcus bacteremia with ampicillin and rocephin ending 5/30/24a and was switched to Amoxicillin for suppression    Anticoagulant long-term use  Cont warfarin    Anemia  Chronic with no acute bleeding, multifactorial 2/2 CKD4 and chronic anticoagulation  -Iron panel done. Iron deficient but Ferritin is elevated at 1186  -Homocysteine normal and methylmalonic pending   -s/p Epo   -Replaced IV iron   -Continue to trend CBC's    Adjustment disorder with depressed mood  Cont SSRI    PAF (paroxysmal atrial fibrillation)  -Continue amiodarone and Coumadin      Type 2 diabetes mellitus without complication, without long-term current use of insulin  -Endocrine    Acute on chronic combined systolic and diastolic heart failure  Owing to his advanced kidney disease and significantly elevated creat, he is not on a  ACEI/ARB/ARNI or MRA. He is also not on a beta blocker due to RV dysfunction.     Plan:    -Volume removal with HD   - See LVAD  - See IVÁN    CAD (coronary artery disease)  -Continue statin    Uninterrupted Critical Care/Counseling Time (not including procedures): 60 mins     Hugh Duran NP  Heart Transplant  [facility] - Cardiac Intensive Care     DISPLAY PLAN FREE TEXT DISPLAY PLAN FREE TEXT

## 2024-06-20 NOTE — PLAN OF CARE
Problem: Physical Therapy  Goal: Physical Therapy Goal  Description: Goals to be completed by: 7/14/24    1.Pt will perform sup<>sit transfers w/ contact guard assistance  2.Pt will have sufficient dynamic balance to sit EOB while performing ADLs/therex w/ supervision  3.Pt will be able to stand up from EOB w/ contact guard assistance using LRAD  4.Pt will ambulate 100 feet w/ minimum assistance using LRAD  5.Pt will be independent w/ HEP therex on BLE w/ good form and ROM   Outcome: Progressing   Goals remain appropriate. 6/20/2024

## 2024-06-20 NOTE — PLAN OF CARE
CICU DAILY GOALS       A: Awake    RASS: Goal - RASS Goal: 0-->alert and calm  Actual - RASS (Chávez Agitation-Sedation Scale): alert and calm   Restraint necessity: Clinical Justification: Other  B: Breath   SBT: Not intubated   C: Coordinate A & B, analgesics/sedatives   Pain: managed    SAT: Not intubated  D: Delirium   CAM-ICU: Overall CAM-ICU: Negative  E: Early(intubated/ Progressive (non-intubated) Mobility   MOVE Screen: Pass   Activity: Activity Management: Arm raise - L1  FAS: Feeding/Nutrition   Diet order: Diet/Nutrition Received: NPO, tube feeding, Specialty Diet/Nutrition Received: renal diet Fluid restriction: Fluid Requirement: 1L FR   Nutritional Supplement Intake: Quantity 0, Type: Novasource  T: Thrombus   DVT prophylaxis: VTE Required Core Measure: Pharmacological prophylaxis initiated/maintained  H: HOB Elevation   Head of Bed (HOB) Positioning: HOB at 20-30 degrees  U: Ulcer Prophylaxis   GI: yes  G: Glucose control   managed Glycemic Management: blood glucose monitored    S: Skin   Nurses Note -- 4 Eyes  Skin assessed during: Q Shift Change   CHOOSE ONE:  [] No Altered Skin Integrity Present      []Prevention Measures Documented    [x] Yes- Altered Skin Integrity Present or Discovered     [] LDA Added if Not in Epic (Describe Wound)     [x] New Altered Skin Integrity was Present on Admit and Documented in LDA     [] Wound Image Taken  Wound Care Consulted? Yes  Attending Nurse:  Karolyn Avalos RN/Staff Member:      B: Bowel Function   no issues   I: Indwelling Catheters   [place] necessity: [REMOVED]      Urethral Catheter 06/12/24 0300 16 Fr.-Reason for Continuing Urinary Catheterization: Critically ill in ICU and requiring hourly monitoring of intake/output   CVC necessity: Yes   IPAD offered: No  D: De-escalation Antibx   Yes  Plan for the day   Hemodynamic stability  Family/Goals of care/Code Status   Code Status: Full Code     No acute events throughout day, VS and assessment per flow  sheet, patient progressing towards goals as tolerated, plan of care reviewed with Radha Abbott and family, all concerns addressed, will continue to monitor.

## 2024-06-20 NOTE — ASSESSMENT & PLAN NOTE
BG goal 140-180      Plan:  -[place] [facility] [place] (Started due to TF and BG excursions)  -BG monitoring q4hr  -Hypoglycemia protocol in place    ** Please call Endocrine for any BG related issues **    Discharge plans:   BRANDO  -D/C Amaryl

## 2024-06-20 NOTE — ASSESSMENT & PLAN NOTE
Owing to his advanced kidney disease and significantly elevated creat, he is not on a  ACEI/ARB/ARNI or MRA. He is also not on a beta blocker due to RV dysfunction.     Plan:   -Volume removal with HD   - See LVAD  - See IVÁN

## 2024-06-20 NOTE — ASSESSMENT & PLAN NOTE
-HeartMate 3 Implanted  12/1/2023  as DT  -Cont Coumadin, dosing by PharmD.  Goal INR 2.0-3.0. therapeutic today.   -Antiplatelets Not on ASA  -LDH is stable overall today. Will continue to monitor daily.  -Speed set at  5000     -Interrogation notable for no events  -Not listed for OHTx, declined for OHTX due to comorbidities     Procedure: Device Interrogation Including analysis of device parameters  Current Settings: Ventricular Assist Device  Review of device function is stable        6/20/2024    11:00 AM 6/20/2024    10:00 AM 6/20/2024     9:00 AM 6/20/2024     8:00 AM 6/20/2024     7:01 AM 6/20/2024     6:01 AM 6/20/2024     5:01 AM   TXP LVAD INTERROGATIONS   Type HeartMate3 HeartMate3 HeartMate3 HeartMate3 HeartMate3 HeartMate3 HeartMate3   Flow 4.3 4.3 4.4 4.2 4.3 4.3 4.4   Speed 5000 5050 5000 5000 5000 5000 5000   PI 3.5 3.4 3.2 3.6 3.1 3.4 3.3   Power ([place]) 3.4 3.4 3.5 3.4 3.4 3.4 3.5   LSL 4600 4600 4600 4600 4600 4600 4600   Pulsatility Intermittent pulse Intermittent pulse Intermittent pulse Intermittent pulse Intermittent pulse Intermittent pulse Intermittent pulse

## 2024-06-20 NOTE — PROGRESS NOTES
[facility] - Cardiac Intensive Care  Endocrinology  Progress Note    Admit Date: 6/11/2024     Reason for Consult: Management of T2DM, Hyperglycemia     Surgical Procedure and Date: HM3 placement as DT (12/1/23)     Diabetes diagnosis year: several years ago    Home Diabetes Medications:  Amaryl 1 mg (recently prescribed by PCP about a week ago)     Lab Results   Component Value Date    HGBA1C 7.4 (H) 04/17/2024       How often checking glucose at home? Once daily in the AM ( prior to starting Amaryl BG in the 300s, since starting Amaryl BG 70-130s)   BG readings on regimen: 70-130s  Hypoglycemia on the regimen?  No  Missed doses on regimen?  No    Diabetes Complications include:     Hypoglycemia , Diabetic nephropathy  , Diabetic chronic kidney disease     , and Diabetic peripheral neuropathy     Complicating diabetes co morbidities:   CHF and CKD      HPI:   Patient is a 64 y.o. male with a diagnosis of stage D HFrEF, ICMP who underwent HM3 placement as DT (12/1/23) w/ a hospital stay complicated by vasoplegia(requiring Giaprezza), debility, malnutrition/impaired swallowing, and renal failure requiring HD (since weaned off) w/ recent discharge for ADHF presented to the ED with wife due to c/o worsening generalized weakness since 6/10. Per wife patient was mostly sleeping during the day despite sleeping 10 hrs at night. Patient also admits decreased appetite and decreased urine output despite taking his medications as prescribed. He has chronic shortness of breath on exertion. Denies confusion, syncope, diarrhea, constipation, N/V, dysuria, or other complaints. Patient had his prior tunnel cath removed used for HD about 2 weeks ago. He completed a 6 week therapy for Enterococcus bacteremia with ampicillin and rocephin ending 5/30/24 and was switched to PO antibiotics. Off note, patient was recently started by his PCP on glimepiride 1mg and unclear of BG trends during the day. Per patient last fasting  on 6/11.  "Patient reports lasting taking the glimepiride on  AM.   Admitted for worsening renal function with a BUN>100/Cr. 9.9. Endocrinology consulted for management of T2DM.          Interval HPI:   Overnight events: No acute events overnight. Patient in room [id number] A. Blood glucose stable. BG at and above goal on current insulin regimen (SSI ). Steroid use- None.    Renal function- Abnormal - Creatinine 4.5   Vasopressors-  None       Endocrine will continue to follow and manage insulin orders inpatient.         Diet Minced & Moist (IDDSI Level 5) Fluid - 1500mL     Eatin%  Nausea: No  Hypoglycemia and intervention: No  Fever: No  TPN and/or TF: No      BP (!) 66/0 (BP Location: Right arm, Patient Position: Lying)   Pulse 88   Temp 98.4 °F (36.9 °C) (Oral)   Resp (!) 21   Ht 6' 1" (1.854 m)   Wt 76.3 kg (168 lb 3.4 oz)   SpO2 97%   BMI 22.19 kg/m²     Labs Reviewed and Include    Recent Labs   Lab 24  0308   *   CALCIUM 8.7   *   K 3.7   CO2 17*      BUN 64*   CREATININE 4.5*     Lab Results   Component Value Date    WBC 7.05 2024    HGB 8.1 (L) 2024    HCT 24.7 (L) 2024    MCV 85 2024     2024     No results for input(s): "TSH", "FREET4" in the last 168 hours.  Lab Results   Component Value Date    HGBA1C 7.4 (H) 2024       Nutritional status:   Body mass index is 22.19 kg/m².  Lab Results   Component Value Date    ALBUMIN 2.1 (L) 2024    ALBUMIN 2.4 (L) 2024    ALBUMIN 2.3 (L) 2024     Lab Results   Component Value Date    PREALBUMIN 14 (L) 2024    PREALBUMIN 15 (L) 2024    PREALBUMIN 22 2024       Estimated Creatinine Clearance: 18.4 mL/min (A) (based on SCr of 4.5 mg/dL (H)).    Accu-Checks  Recent Labs     24 24  0050 24  0314 24  0950 24  1358 24  1810 24  2018 24  0305 24  0801 24  0908   POCTGLUCOSE 245* 166* 127* 269* " 229* 186* 185* 183* 255* 274*       Current Medications and/or Treatments Impacting Glycemic Control  Immunotherapy:    Immunosuppressants       None          Steroids:   Hormones (From admission, onward)      None          Pressors:    Autonomic Drugs (From admission, onward)      Start     Stop Route Frequency Ordered    06/19/24 1415  midodrine tablet 10 mg         -- Oral Every 8 hours 06/19/24 1414          Hyperglycemia/Diabetes Medications:   Antihyperglycemics (From admission, onward)      Start     Stop Route Frequency Ordered    06/18/24 1011  insulin aspart U-100 pen 0-5 Units         -- SubQ Every 4 hours PRN 06/18/24 0912            ASSESSMENT and PLAN    Cardiac/Vascular  LVAD (left ventricular assist device) present  Managed per primary team  Avoid hypoglycemia        PAF (paroxysmal atrial fibrillation)  May increase insulin resistance.         Renal/  * Acute renal failure superimposed on stage 4 chronic kidney disease  Titrate insulin slowly to avoid hypoglycemia as the risk of hypoglycemia increases with decreased creatinine clearance.        Endocrine  Type 2 diabetes mellitus without complication, without long-term current use of insulin  BG goal 140-180      [place]:  [place] [facility] [place] (Started due to TF and BG excursions)  -BG monitoring q4hr  -Hypoglycemia protocol in place    ** Please call Endocrine for any BG related issues **    Discharge plans:   TBD  -D/C Amaryl                Erasmo Parrish DNP, FNP  Endocrinology  [facility] - Cardiac Intensive Care

## 2024-06-20 NOTE — SUBJECTIVE & OBJECTIVE
"Interval HPI:   Overnight events: No acute events overnight. Patient in room [id number] A. Blood glucose stable. BG at and above goal on current insulin regimen (SSI ). Steroid use- None.    Renal function- Abnormal - Creatinine 4.5   Vasopressors-  None       Endocrine will continue to follow and manage insulin orders inpatient.         Diet Minced & Moist (IDDSI Level 5) Fluid - 1500mL     Eatin%  Nausea: No  Hypoglycemia and intervention: No  Fever: No  TPN and/or TF: No      BP (!) 66/0 (BP Location: Right arm, Patient Position: Lying)   Pulse 88   Temp 98.4 °F (36.9 °C) (Oral)   Resp (!) 21   Ht 6' 1" (1.854 m)   Wt 76.3 kg (168 lb 3.4 oz)   SpO2 97%   BMI 22.19 kg/m²     Labs Reviewed and Include    Recent Labs   Lab 24  0308   *   CALCIUM 8.7   *   K 3.7   CO2 17*      BUN 64*   CREATININE 4.5*     Lab Results   Component Value Date    WBC 7.05 2024    HGB 8.1 (L) 2024    HCT 24.7 (L) 2024    MCV 85 2024     2024     No results for input(s): "TSH", "FREET4" in the last 168 hours.  Lab Results   Component Value Date    HGBA1C 7.4 (H) 2024       Nutritional status:   Body mass index is 22.19 kg/m².  Lab Results   Component Value Date    ALBUMIN 2.1 (L) 2024    ALBUMIN 2.4 (L) 2024    ALBUMIN 2.3 (L) 2024     Lab Results   Component Value Date    PREALBUMIN 14 (L) 2024    PREALBUMIN 15 (L) 2024    PREALBUMIN 22 2024       Estimated Creatinine Clearance: 18.4 mL/min (A) (based on SCr of 4.5 mg/dL (H)).    Accu-Checks  Recent Labs     24  2101 24  0050 24  0314 24  0950 24  1358 24  1810 24  2018 24  0305 24  0801 24  0908   POCTGLUCOSE 245* 166* 127* 269* 229* 186* 185* 183* 255* 274*       Current Medications and/or Treatments Impacting Glycemic Control  Immunotherapy:    Immunosuppressants       None          Steroids:   Hormones " (From admission, onward)      None          Pressors:    Autonomic Drugs (From admission, onward)      Start     Stop Route Frequency Ordered    06/19/24 1415  midodrine tablet 10 mg         -- Oral Every 8 hours 06/19/24 1414          Hyperglycemia/Diabetes Medications:   Antihyperglycemics (From admission, onward)      Start     Stop Route Frequency Ordered    06/18/24 1011  insulin aspart U-100 pen 0-5 Units         -- SubQ Every 4 hours PRN 06/18/24 0912

## 2024-06-20 NOTE — PROGRESS NOTES
3.5hr bedside HD treatment completed. 2L of fluid removed pt tolerated well. Report given to primary nurse at bedside.

## 2024-06-20 NOTE — PROGRESS NOTES
[facility] - Cardiac Intensive Care  Nephrology  Progress Note    Patient Name: Radha Abbott  MRN: [id number]  Admission Date: 6/11/2024  Hospital Length of Stay: 9 days  Attending Provider: Sajan Hurley, *   Primary Care Physician: Vasu Kong MD  Principal Problem:Acute renal failure superimposed on stage 4 chronic kidney disease    Subjective:     HPI: Mr. Abbott is a 64-year-old man with ischemic cardiomyopathy with most recent TTE showing EF 10 -15% and G3DD s/p [employer] ICM placement previously on chronic dobutamine infusion however now with Heartmate III LVAD placed on 12/01/2023, CAD s/p x3 vessel CABG back in 2009, type 2 diabetes mellitus (most recent hemoglobin A1c 7.4%), hypertension, HLD, history of ventricular fibrillation for which he is on amiodarone, chronic AC with Coumadin, advanced CKD V (baseline creatinine ~6) with recurrent AKIs and previous dialysis dependence (tunneled HD catheter removed in early March of this year). He had a recent hospitalization where he was treated for fluid overload acute on chronic CHF exacerbation and IVÁN thought to be 2/2 type 2 CRS and was subsequently diuresed and discharged with a creatinine of 6.2. He presents to the hospital on 6/11 for concerns of worsening fatigue that started one day prior to him arriving to the hospital, and has had difficulties getting out of bed. He has also noted a decrease in his urine output over the past 36 hours despite Diuretic use. He admits adherence to his Metolazone and Bumex at home, however has not passed any urine in the past 10 hours. Bed side bladder scan revealed 200 mL of urine in his bladder. Upon arrival to the ER, his is hemodynamically stable, Labs showed Hgb of 6.3, electrolytes within acceptable limits, Serum CO2 of 21, BUN of 118, and a creatinine of 9.8. He denies feeling fluid overloaded or short of breath, reports good appetite, denies nausea, vomiting, or metallic taste in his mouth. Chest xray  showed mild central vascular congestion and interstitial pulmonary opacities which appear improved compared to previous studies. CT abdomen/Pelvis without contrast revealed Kidneys that were reported as normal in size and location, and without hydronephrosis or ureteral dilation.     Interval History:   NAEON.  Oxygenating well on RA, CVP 5.  Net positive ~ 2L/24h.      Review of patient's allergies indicates:  No Known Allergies  Current Facility-Administered Medications   Medication Frequency    0.9%  NaCl infusion (for blood administration) Q24H PRN    0.9%  NaCl infusion Continuous    0.9%  NaCl infusion PRN    0.9%  NaCl infusion Once    acetaminophen tablet 650 mg Q6H PRN    acetaminophen tablet 650 mg Q6H PRN    amiodarone tablet 400 mg Daily    amoxicillin 400 mg/5 mL suspension 500 mg Q12H    atorvastatin tablet 40 mg QHS    dextrose 10 % infusion Continuous PRN    dextrose 10% bolus 125 mL 125 mL PRN    dextrose 10% bolus 250 mL 250 mL PRN    epoetin zohaib injection 4,000 Units Every Mon, Wed, Fri    folic acid tablet 1 mg Daily    gabapentin capsule 100 mg BID    glucagon (human recombinant) injection 1 mg PRN    insulin aspart U-100 pen 0-10 Units Q4H PRN    LIDOcaine 5 % patch 2 patch Q24H    midodrine tablet 10 mg Q8H    pantoprazole EC tablet 40 mg Daily    QUEtiapine tablet 50 mg QHS    simethicone chewable tablet 80 mg TID PRN    trazodone split tablet 25 mg Nightly PRN    venlafaxine tablet 37.5 mg Daily    vitamin D 1000 units tablet 1,000 Units Daily    warfarin (COUMADIN) tablet 2.5 mg Daily       Objective:     Vital Signs (Most Recent):  Temp: 98.5 °F (36.9 °C) (06/20/24 0701)  Pulse: 91 (06/20/24 1318)  Resp: (!) 48 (06/20/24 1318)  BP: (!) 66/0 (06/20/24 1345)  SpO2: (!) 94 % (06/20/24 1200) Vital Signs (24h Range):  Temp:  [98 °F (36.7 °C)-98.5 °F (36.9 °C)] 98.5 °F (36.9 °C)  Pulse:  [82-92] 91  Resp:  [12-51] 48  SpO2:  [92 %-99 %] 94 %  BP: ()/(0-80) 66/0     Weight: 76.3 kg (168 lb  3.4 oz) (06/20/24 0601)  Body mass index is 22.19 kg/m².  Body surface area is 1.98 meters squared.    I/O last 3 completed shifts:  In: 2441.9 [P.O.:80; I.V.:283.2; NG/GT:1710; IV Piggyback:368.7]  Out: 60 [Drains:60]     Physical Exam  Vitals and nursing note reviewed.   Constitutional:       Appearance: Normal appearance.   HENT:      Head: Normocephalic and atraumatic.   Eyes:      Conjunctiva/sclera: Conjunctivae normal.      Pupils: Pupils are equal, round, and reactive to light.   Neck:      Comments: RIJ trialysis cath.   Cardiovascular:      Rate and Rhythm: Normal rate and regular rhythm.      Comments: Smooth VAD hum  Pulmonary:      Effort: Pulmonary effort is normal. No respiratory distress.      Breath sounds: Normal breath sounds. No wheezing or rales.   Abdominal:      General: Bowel sounds are normal.      Palpations: Abdomen is soft.   Musculoskeletal:         General: No swelling. Normal range of motion.      Cervical back: Normal range of motion and neck supple.      Right lower leg: No edema.      Left lower leg: No edema.   Skin:     General: Skin is warm and dry.   Neurological:      General: No focal deficit present.      Mental Status: He is alert and oriented to person, place, and time.          Significant Labs:  CBC:   Recent Labs   Lab 06/20/24  0308   WBC 7.05   RBC 2.90*   HGB 8.1*   HCT 24.7*      MCV 85   MCH 27.9   MCHC 32.8     CMP:   Recent Labs   Lab 06/19/24  0312 06/20/24  0308   * 171*   CALCIUM 8.3* 8.7   ALBUMIN 2.1*  --    PROT 7.2  --    * 133*   K 3.7 3.7   CO2 18* 17*    103   BUN 32* 64*   CREATININE 2.7* 4.5*   ALKPHOS 369*  --    *  --    *  --    BILITOT 0.5  --         Assessment/Plan:     Cardiac/Vascular  Acute on chronic combined systolic and diastolic heart failure  UF with HD    Renal/  * Acute renal failure superimposed on stage 4 chronic kidney disease  Patient with a baseline eGFR that appears to be variable however  was noted to be in the 15-20 range in February before his recent IVÁN in May, presents now with fatigue and worsening IVÁN with a creatinine of 9.8 and eGFR of 5.5. BUN was noted to be elevated at 117, other electrolytes were normal. He was noted to have some asterixis on exam (which he states is chronic for him and unchanged from his baseline), however denies any other signs or symptoms of uremia. Bedside bladder scan revealed 200 mL of urine in his bladder and his last bladder void was 10 hours prior to this consult.     SLED initiated 6/13 x4 hours.  Mental status improved.      Recommendations:     -Plan for HD today  -UF 2L  -Avoid nephrotoxic agents (IV contrast, NSAID's, gadolinium contrast, PPI's) if able.   -Strict I's and O's  -Renally dose all medications  -will need OP HD upon discharge, recommend CM/SW involvement to start workup.  -Please consult IR for TDC Placement  -will continue thrice weekly HD while IP    Oncology  Anemia  -EPO with HD  -Feraheme 6/19      Norm Vidal NP  Nephrology  [facility] - Cardiac Intensive Care

## 2024-06-20 NOTE — SUBJECTIVE & OBJECTIVE
Interval History:   NAEON.  Oxygenating well on RA, CVP 5.  Net positive ~ 2L/24h.      Review of patient's allergies indicates:  No Known Allergies  Current Facility-Administered Medications   Medication Frequency    0.9%  NaCl infusion (for blood administration) Q24H PRN    0.9%  NaCl infusion Continuous    0.9%  NaCl infusion PRN    0.9%  NaCl infusion Once    acetaminophen tablet 650 mg Q6H PRN    acetaminophen tablet 650 mg Q6H PRN    amiodarone tablet 400 mg Daily    amoxicillin 400 mg/5 mL suspension 500 mg Q12H    atorvastatin tablet 40 mg QHS    dextrose 10 % infusion Continuous PRN    dextrose 10% bolus 125 mL 125 mL PRN    dextrose 10% bolus 250 mL 250 mL PRN    epoetin zohaib injection 4,000 Units Every Mon, Wed, Fri    folic acid tablet 1 mg Daily    gabapentin capsule 100 mg BID    glucagon (human recombinant) injection 1 mg PRN    insulin aspart U-100 pen 0-10 Units Q4H PRN    LIDOcaine 5 % patch 2 patch Q24H    midodrine tablet 10 mg Q8H    pantoprazole EC tablet 40 mg Daily    QUEtiapine tablet 50 mg QHS    simethicone chewable tablet 80 mg TID PRN    trazodone split tablet 25 mg Nightly PRN    venlafaxine tablet 37.5 mg Daily    vitamin D 1000 units tablet 1,000 Units Daily    warfarin (COUMADIN) tablet 2.5 mg Daily       Objective:     Vital Signs (Most Recent):  Temp: 98.5 °F (36.9 °C) (06/20/24 0701)  Pulse: 91 (06/20/24 1318)  Resp: (!) 48 (06/20/24 1318)  BP: (!) 66/0 (06/20/24 1345)  SpO2: (!) 94 % (06/20/24 1200) Vital Signs (24h Range):  Temp:  [98 °F (36.7 °C)-98.5 °F (36.9 °C)] 98.5 °F (36.9 °C)  Pulse:  [82-92] 91  Resp:  [12-51] 48  SpO2:  [92 %-99 %] 94 %  BP: ()/(0-80) 66/0     Weight: 76.3 kg (168 lb 3.4 oz) (06/20/24 0601)  Body mass index is 22.19 kg/m².  Body surface area is 1.98 meters squared.    I/O last 3 completed shifts:  In: 2441.9 [P.O.:80; I.V.:283.2; NG/GT:1710; IV Piggyback:368.7]  Out: 60 [Drains:60]     Physical Exam  Vitals and nursing note reviewed.    Constitutional:       Appearance: Normal appearance.   HENT:      Head: Normocephalic and atraumatic.   Eyes:      Conjunctiva/sclera: Conjunctivae normal.      Pupils: Pupils are equal, round, and reactive to light.   Neck:      Comments: RIJ trialysis cath.   Cardiovascular:      Rate and Rhythm: Normal rate and regular rhythm.      Comments: Smooth VAD hum  Pulmonary:      Effort: Pulmonary effort is normal. No respiratory distress.      Breath sounds: Normal breath sounds. No wheezing or rales.   Abdominal:      General: Bowel sounds are normal.      Palpations: Abdomen is soft.   Musculoskeletal:         General: No swelling. Normal range of motion.      Cervical back: Normal range of motion and neck supple.      Right lower leg: No edema.      Left lower leg: No edema.   Skin:     General: Skin is warm and dry.   Neurological:      General: No focal deficit present.      Mental Status: He is alert and oriented to person, place, and time.          Significant Labs:  CBC:   Recent Labs   Lab 06/20/24  0308   WBC 7.05   RBC 2.90*   HGB 8.1*   HCT 24.7*      MCV 85   MCH 27.9   MCHC 32.8     CMP:   Recent Labs   Lab 06/19/24  0312 06/20/24  0308   * 171*   CALCIUM 8.3* 8.7   ALBUMIN 2.1*  --    PROT 7.2  --    * 133*   K 3.7 3.7   CO2 18* 17*    103   BUN 32* 64*   CREATININE 2.7* 4.5*   ALKPHOS 369*  --    *  --    *  --    BILITOT 0.5  --

## 2024-06-20 NOTE — ASSESSMENT & PLAN NOTE
Patient with a baseline eGFR that appears to be variable however was noted to be in the 15-20 range in February before his recent IVÁN in May, presents now with fatigue and worsening IVÁN with a creatinine of 9.8 and eGFR of 5.5. BUN was noted to be elevated at 117, other electrolytes were normal. He was noted to have some asterixis on exam (which he states is chronic for him and unchanged from his baseline), however denies any other signs or symptoms of uremia. Bedside bladder scan revealed 200 mL of urine in his bladder and his last bladder void was 10 hours prior to this consult.     SLED initiated 6/13 x4 hours.  Mental status improved.      Recommendations:     -Plan for HD today  -UF 2L  -Avoid nephrotoxic agents (IV contrast, NSAID's, gadolinium contrast, PPI's) if able.   -Strict I's and O's  -Renally dose all medications  -will need OP HD upon discharge, recommend CM/SW involvement to start workup.  -Please consult IR for TDC Placement  -will continue thrice weekly HD while IP

## 2024-06-20 NOTE — PT/OT/SLP PROGRESS
Speech Language Pathology Treatment    Patient Name:  Radha Abbott   MRN:  [id number]  Admitting Diagnosis: Acute renal failure superimposed on stage 4 chronic kidney disease    Recommendations:                 General Recommendations:  Dysphagia therapy  Diet recommendations:  Minced & Moist Diet - IDDSI Level 5, Liquid Diet Level: Thin liquids - IDDSI Level 0   Aspiration Precautions: 1 bite/sip at a time, Alternating bites/sips, HOB to 90 degrees, Monitor for s/s of aspiration, Small bites/sips, and Strict aspiration precautions   General Precautions: Standard, LVAD, fall  Communication strategies:  none    Assessment:     Radha Abbott is a 64 y.o. male with an SLP diagnosis of Dysphagia further impacted by fluctuating alertness and cognitive status.   Subjective     Spoke with RN prior to session, stating pt consuming approx 25% of his meals this past day.     Pain/Comfort:  Pain Rating 1: 0/10  Pain Rating Post-Intervention 1: 0/10    Respiratory Status: Room air    Objective:     Has the patient been evaluated by SLP for swallowing?   Yes  Keep patient NPO? No   Current Respiratory Status:    Room air     Pt seen for ongoing dysphagia tx. Pt asleep that slowly rousing in response to name and tactile stimulation. HOB was raised to 90 degrees for PO trials. Pt with minimal verbal output, responding mostly in short phrases or single words, with overall flat affect. Pt consumed multiple large cyclical straw sips with no overt signs of aspiration. Trial sharee cracker dipped in applesauce, though pt with significantly prolonged mastication noted, requiring a liquid wash to clear oral cavity. Overall, no overt signs of aspiration were noted. Discussed continuing with current diet order to ensure safe and efficient PO intake during this time, pt verbalized understanding. Whiteboard was kept current, updated RN on impressions.     Goals:   Multidisciplinary Problems       SLP Goals          Problem: SLP    Goal Priority  Disciplines Outcome   SLP Goal     SLP Progressing   Description: Speech Language Pathology Goals  Goals expected to be met by    1. Pt will tolerate regular unrestricted diet without any overt clinical signs of aspiration                        Plan:     Patient to be seen:  4 x/week   Plan of Care reviewed with:  patient, spouse   SLP Follow-Up:  Yes       Discharge recommendations:  High Intensity Therapy   Barriers to Discharge:  Level of Skilled Assistance Needed     Time Tracking:     SLP Treatment Date:   06/20/24  Speech Start Time:  1046  Speech Stop Time:  1102     Speech Total Time (min):  16 min    Billable Minutes: Treatment Swallowing Dysfunction 8 and Self Care/Home Management Training 8    06/20/2024

## 2024-06-20 NOTE — PROGRESS NOTES
Report received from primary nurse at bedside. Bedside HD started via RIJ cvc. Family at bedside. Pt tolerating well.

## 2024-06-20 NOTE — PLAN OF CARE
CICU DAILY GOALS       A: Awake    RASS: Goal - RASS Goal: 0-->alert and calm  Actual - RASS (Chávez Agitation-Sedation Scale): alert and calm   Restraint necessity: Clinical Justification: Other  B: Breath   SBT: NA   C: Coordinate A & B, analgesics/sedatives   Pain: managed    SAT: NA  D: Delirium   CAM-ICU: Overall CAM-ICU: Negative  E: Early(intubated/ Progressive (non-intubated) Mobility   MOVE Screen: Pass   Activity: Activity Management: Ankle pumps - L1, Arm raise - L1  FAS: Feeding/Nutrition   Diet order: Diet/Nutrition Received: mechanical/dental soft, Specialty Diet/Nutrition Received: renal diet Fluid restriction: Fluid Requirement: 1L FR   Nutritional Supplement Intake: Quantity 75%, Type: Novasource  T: Thrombus   DVT prophylaxis: VTE Required Core Measure: Pharmacological prophylaxis initiated/maintained  H: HOB Elevation   Head of Bed (HOB) Positioning: HOB at 30-45 degrees  U: Ulcer Prophylaxis   GI: yes  G: Glucose control   managed Glycemic Management: blood glucose monitored    S: Skin   Nurses Note -- 4 Eyes  Skin assessed during: Daily Assessment   CHOOSE ONE:  [] No Altered Skin Integrity Present      [x]Prevention Measures Documented    [x] Yes- Altered Skin Integrity Present or Discovered     [] LDA Added if Not in Epic (Describe Wound)     [] New Altered Skin Integrity was Present on Admit and Documented in LDA     [] Wound Image Taken  Wound Care Consulted? Yes  Attending Nurse:  Norberto Avalos RN/Staff Member:  KYAW Goodwin    B: Bowel Function   no issues   I: Indwelling Catheters   [place] necessity: [REMOVED]      Urethral Catheter 06/12/24 0300 16 Fr.-Reason for Continuing Urinary Catheterization: Critically ill in ICU and requiring hourly monitoring of intake/output   CVC necessity: Yes   IPAD offered: Not appropriate  D: De-escalation Antibx   No  Plan for the day   HD 2L removed; Increase oral intake  Family/Goals of care/Code Status   Code Status: Full Code     No acute events  throughout day, VS and assessment per flow sheet, patient progressing towards goals as tolerated, plan of care reviewed with Radha Abbott and family, all concerns addressed, will continue to monitor.

## 2024-06-20 NOTE — CARE UPDATE
Heart Transplant Care Update Note:    Hemodynamics@8pm:  CVP:5  SVO2:48   CO: 5.1  CI: 2.6  SVR: 1030        Continuous Infusions:   sodium chloride 0.9%   Intravenous Continuous   Stopped at 06/13/24 0117    dextrose 10 % in water (D10W)   Intravenous Continuous PRN           Intake/Output Summary (Last 24 hours) at 6/19/2024 2148  Last data filed at 6/19/2024 2137  Gross per 24 hour   Intake 1657.46 ml   Output 60 ml   Net 1597.46 ml           Plan:  No changes. Continue current management       Discussed with [facility] Attending      Erin CONRAD MD  Cardiovascular Disease PGY IV  [facility]

## 2024-06-20 NOTE — CONSULTS
[facility] - Cardiac Intensive Care  Wound Care    Patient Name:  Radha Abbott   MRN:  [id number]  Date: 6/20/2024  Diagnosis: Acute renal failure superimposed on stage 4 chronic kidney disease    History:     Past Medical History:   Diagnosis Date    CAD (coronary artery disease)     CHF (congestive heart failure)     Diabetes mellitus     HFrEF (heart failure with reduced ejection fraction)     Hypoglycemia 06/12/2024    ICD (implantable cardioverter-defibrillator) in place     MI, old     Type 2 diabetes mellitus without complication, without long-term current use of insulin 10/07/2023       Social History     Socioeconomic History    Marital status:    Tobacco Use    Smoking status: Former     Current packs/day: 0.50     Types: Cigarettes   Substance and Sexual Activity    Alcohol use: Yes     Comment: rarely    Drug use: No    Sexual activity: Not Currently     Partners: Female     Social Determinants of Health     Financial Resource Strain: High Risk (3/26/2024)    Overall Financial Resource Strain (CARDIA)     Difficulty of Paying Living Expenses: Hard   Food Insecurity: Food Insecurity Present (3/26/2024)    Hunger Vital Sign     Worried About Running Out of Food in the Last Year: Sometimes true     Ran Out of Food in the Last Year: Often true   Transportation Needs: No Transportation Needs (3/26/2024)    PRAPARE - Transportation     Lack of Transportation (Medical): No     Lack of Transportation (Non-Medical): No   Physical Activity: Inactive (2/29/2024)    Exercise Vital Sign     Days of Exercise per Week: 2 days     Minutes of Exercise per Session: 0 min   Stress: Stress Concern Present (3/26/2024)    [employer] - Occupational Stress Questionnaire     Feeling of Stress : To some extent   Housing Stability: High Risk (3/26/2024)    Housing Stability Vital Sign     Unable to Pay for Housing in the Last Year: Yes     Number of Places Lived in the Last Year: 1     Unstable  Housing in the Last Year: No       Precautions:     Allergies as of 06/11/2024    (No Known Allergies)       [facility] Assessment Details/Treatment     Patient seen for wound care consultation for perirectal region.   Reviewed chart for this encounter.   See Flow Sheet for findings.    Pt found lying in bed, agreeable to care at this time. Pt turned into lateral position for assessment. Pts sacrum intact, pt has x1 small area of linear pink, intact scar tissue to R of rectum. No open wounds at this time, will order Triad for moisture barrier protection.    RECOMMENDATIONS:  BID/PRN - perirectal region - cleanse gently w/ soap and water, pat dry and apply Triad to affected area, leave PRIETO.    Discussed POC with patient and primary nurse.   See EMR for orders & patient education.    Bedside nursing to continue care & monitoring.  Bedside nursing to maintain pressure injury prevention interventions. Wound care will sign off.       06/20/24 1242   WOCN Assessment   WOCN Total Time (mins) 15   Visit Date 06/20/24   Visit Time 1242   Consult Type New   WOCN Speciality Wound   Intervention assessed;changed;applied;chart review;coordination of care;orders   Teaching on-going        Wound 06/19/24 0058 Abrasion(s) Perineum   Date First Assessed/Time First Assessed: 06/19/24 0058   Primary Wound Type: Abrasion(s)  Location: Perineum  Is this injury device related?: No   Wound Image    Dressing Appearance Open to air   Drainage Amount None   Drainage Characteristics/Odor No odor   Appearance Intact;Pink;Dry   Tissue loss description Not applicable  (scar tissue)   Periwound Area Intact;Moist   Care Cleansed with:;Soap and water;Applied:;Skin Barrier

## 2024-06-20 NOTE — SUBJECTIVE & OBJECTIVE
Interval History: No acute events overnight. Midodrine started yesterday and weaned off Levophed. H&H improved today. Awake and oriented today. Stood at bedside with PT/OT. Tolerating tube feeds and modified diet ordered. Hopefully can remove NG tube tomorrow. Will get HD today with 2L fluid removal goal.         Continuous Infusions:   sodium chloride 0.9%   Intravenous Continuous   Stopped at 06/13/24 0117    dextrose 10 % in water (D10W)   Intravenous Continuous PRN         Scheduled Meds:   sodium chloride 0.9%   Intravenous Once    amiodarone  400 mg Oral Daily    amoxicillin  500 mg Per NG tube Q12H    atorvastatin  40 mg Oral QHS    epoetin zohaib (PROCRIT) injection  4,000 Units Intravenous Every Mon, Wed, Fri    folic acid  1 mg Oral Daily    gabapentin  100 mg Oral BID    LIDOcaine  2 patch Transdermal Q24H    midodrine  10 mg Oral Q8H    pantoprazole  40 mg Oral Daily    QUEtiapine  50 mg Oral QHS    venlafaxine  37.5 mg Oral Daily    vitamin D  1,000 Units Oral Daily    warfarin  2.5 mg Oral Daily     PRN Meds:  Current Facility-Administered Medications:     0.9%  NaCl infusion (for blood administration), , Intravenous, Q24H PRN    sodium chloride 0.9%, , Intravenous, PRN    acetaminophen, 650 mg, Oral, Q6H PRN    acetaminophen, 650 mg, Per NG tube, Q6H PRN    dextrose 10 % in water (D10W), , Intravenous, Continuous PRN    dextrose 10%, 12.5 g, Intravenous, PRN    dextrose 10%, 25 g, Intravenous, PRN    glucagon (human recombinant), 1 mg, Intramuscular, PRN    insulin aspart U-100, 0-10 Units, Subcutaneous, Q4H PRN    simethicone, 1 tablet, Oral, TID PRN    traZODone, 25 mg, Oral, Nightly PRN    Review of patient's allergies indicates:  No Known Allergies  Objective:     Vital Signs (Most Recent):  Temp: 98.5 °F (36.9 °C) (06/20/24 0701)  Pulse: 90 (06/20/24 1000)  Resp: (!) 35 (06/20/24 1000)  BP: (!) 76/0 (06/20/24 1000)  SpO2: 99 % (06/20/24 0900) Vital Signs (24h Range):  Temp:  [98 °F (36.7 °C)-98.5  °F (36.9 °C)] 98.5 °F (36.9 °C)  Pulse:  [82-92] 90  Resp:  [12-51] 35  SpO2:  [92 %-99 %] 99 %  BP: ()/(0-80) 76/0     Patient Vitals for the past 72 hrs (Last 3 readings):   Weight   06/20/24 0601 76.3 kg (168 lb 3.4 oz)   06/19/24 0400 77.5 kg (170 lb 13.7 oz)     Body mass index is 22.19 kg/m².      Intake/Output Summary (Last 24 hours) at 6/20/2024 1227  Last data filed at 6/20/2024 1006  Gross per 24 hour   Intake 1847.66 ml   Output 0 ml   Net 1847.66 ml         Telemetry: SR       Physical Exam  Constitutional:       Appearance: Normal appearance.   HENT:      Head: Normocephalic and atraumatic.   Eyes:      Conjunctiva/sclera: Conjunctivae normal.      Pupils: Pupils are equal, round, and reactive to light.   Neck:      Comments: No JVD   Cardiovascular:      Rate and Rhythm: Normal rate and regular rhythm.      Comments: Smooth VAD hum  Pulmonary:      Effort: Pulmonary effort is normal.      Breath sounds: Normal breath sounds.   Abdominal:      General: Bowel sounds are normal.      Palpations: Abdomen is soft.   Musculoskeletal:         General: No swelling. Normal range of motion.      Cervical back: Normal range of motion and neck supple.   Skin:     General: Skin is warm and dry.      Capillary Refill: Capillary refill takes 2 to 3 seconds.   Neurological:      General: No focal deficit present.      Mental Status: He is alert. Mental status is at baseline.      Comments: Oriented x3            Significant Labs:  CBC:  Recent Labs   Lab 06/18/24  0300 06/19/24  0312 06/20/24  0308   WBC 10.67 8.66 7.05   RBC 2.77* 2.42* 2.90*   HGB 7.7* 6.8* 8.1*   HCT 24.0* 20.9* 24.7*    334 282   MCV 87 86 85   MCH 27.8 28.1 27.9   MCHC 32.1 32.5 32.8     BNP:  Recent Labs   Lab 06/14/24  0221 06/17/24  0319 06/19/24  0312   BNP 1,542* 1,023* 823*     CMP:  Recent Labs   Lab 06/17/24  0319 06/18/24  0300 06/18/24  1816 06/19/24  0312 06/20/24  0308   GLU 96   < > 182* 126* 171*   CALCIUM 8.7   < > 8.8  8.3* 8.7   ALBUMIN 2.3*  --  2.4* 2.1*  --    PROT 7.0  --  7.8 7.2  --    *   < > 131* 131* 133*   K 3.7   < > 4.1 3.7 3.7   CO2 22*   < > 18* 18* 17*   CL 96   < > 102 102 103   BUN 22   < > 17 32* 64*   CREATININE 2.4*   < > 1.8* 2.7* 4.5*   ALKPHOS 287*  --  381* 369*  --    *  --  196* 159*  --    *  --  136* 110*  --    BILITOT 0.7  --  0.8 0.5  --     < > = values in this interval not displayed.      Coagulation:   Recent Labs   Lab 06/18/24 0300 06/19/24 0312 06/20/24  0308   INR 1.9* 2.2* 2.4*   APTT 40.3* 40.0* 42.4*     LDH:  Recent Labs   Lab 06/18/24 0300 06/19/24 0312 06/20/24  0308   * 402* 372*     Microbiology:  Microbiology Results (last 7 days)       Procedure Component Value Units Date/Time    Culture, Respiratory with Gram Stain [[id number]]     Order Status: No result Specimen: Respiratory     Blood culture #1 **CANNOT BE ORDERED STAT** [[id number]] Collected: 06/11/24 2122    Order Status: Completed Specimen: Blood from Peripheral, Antecubital, Right Updated: 06/16/24 2212     Blood Culture, Routine No growth after 5 days.    Blood culture #2 **CANNOT BE ORDERED STAT** [[id number]] Collected: 06/11/24 2121    Order Status: Completed Specimen: Blood from Peripheral, Hand, Left Updated: 06/16/24 2212     Blood Culture, Routine No growth after 5 days.    Urine culture [[id number]] Collected: 06/12/24 1422    Order Status: Completed Specimen: Urine Updated: 06/13/24 2113     Urine Culture, Routine No growth    Narrative:      Specimen Source->Urine            I have reviewed all pertinent labs within the past 24 hours.    Estimated Creatinine Clearance: 18.4 mL/min (A) (based on SCr of 4.5 mg/dL (H)).    Diagnostic Results:  I have reviewed and interpreted all pertinent imaging results/findings within the past 24 hours.

## 2024-06-21 LAB
ALBUMIN SERPL BCP-MCNC: 2.1 G/DL (ref 3.5–5.2)
ALLENS TEST: ABNORMAL
ALLENS TEST: NORMAL
ALP SERPL-CCNC: 387 U/L (ref 55–135)
ALT SERPL W/O P-5'-P-CCNC: 104 U/L (ref 10–44)
ANION GAP SERPL CALC-SCNC: 12 MMOL/L (ref 8–16)
APTT PPP: 44.6 SEC (ref 21–32)
AST SERPL-CCNC: 67 U/L (ref 10–40)
BASOPHILS # BLD AUTO: 0.03 K/UL (ref 0–0.2)
BASOPHILS NFR BLD: 0.3 % (ref 0–1.9)
BILIRUB DIRECT SERPL-MCNC: 0.2 MG/DL (ref 0.1–0.3)
BILIRUB SERPL-MCNC: 0.4 MG/DL (ref 0.1–1)
BNP SERPL-MCNC: 1328 PG/ML (ref 0–99)
BUN SERPL-MCNC: 54 MG/DL (ref 8–23)
CALCIUM SERPL-MCNC: 8.7 MG/DL (ref 8.7–10.5)
CHLORIDE SERPL-SCNC: 106 MMOL/L (ref 95–110)
CO2 SERPL-SCNC: 16 MMOL/L (ref 23–29)
CREAT SERPL-MCNC: 3.7 MG/DL (ref 0.5–1.4)
CRP SERPL-MCNC: 129.5 MG/L (ref 0–8.2)
DELSYS: ABNORMAL
DELSYS: NORMAL
DIFFERENTIAL METHOD BLD: ABNORMAL
EOSINOPHIL # BLD AUTO: 0.1 K/UL (ref 0–0.5)
EOSINOPHIL NFR BLD: 1.1 % (ref 0–8)
ERYTHROCYTE [DISTWIDTH] IN BLOOD BY AUTOMATED COUNT: 18.3 % (ref 11.5–14.5)
EST. GFR  (NO RACE VARIABLE): 17.7 ML/MIN/1.73 M^2
GLUCOSE SERPL-MCNC: 198 MG/DL (ref 70–110)
HCT VFR BLD AUTO: 26.5 % (ref 40–54)
HGB BLD-MCNC: 8.4 G/DL (ref 14–18)
IMM GRANULOCYTES # BLD AUTO: 0.16 K/UL (ref 0–0.04)
IMM GRANULOCYTES NFR BLD AUTO: 1.6 % (ref 0–0.5)
INR PPP: 2.5 (ref 0.8–1.2)
LDH SERPL L TO P-CCNC: 0.93 MMOL/L (ref 0.5–2.2)
LDH SERPL L TO P-CCNC: 375 U/L (ref 110–260)
LYMPHOCYTES # BLD AUTO: 0.6 K/UL (ref 1–4.8)
LYMPHOCYTES NFR BLD: 6 % (ref 18–48)
MAGNESIUM SERPL-MCNC: 2.5 MG/DL (ref 1.6–2.6)
MCH RBC QN AUTO: 27.7 PG (ref 27–31)
MCHC RBC AUTO-ENTMCNC: 31.7 G/DL (ref 32–36)
MCV RBC AUTO: 88 FL (ref 82–98)
METHYLMALONATE SERPL-SCNC: 0.82 UMOL/L
MODE: ABNORMAL
MODE: NORMAL
MONOCYTES # BLD AUTO: 1 K/UL (ref 0.3–1)
MONOCYTES NFR BLD: 9.5 % (ref 4–15)
NEUTROPHILS # BLD AUTO: 8.3 K/UL (ref 1.8–7.7)
NEUTROPHILS NFR BLD: 81.5 % (ref 38–73)
NRBC BLD-RTO: 0 /100 WBC
PHOSPHATE SERPL-MCNC: 1 MG/DL (ref 2.7–4.5)
PLATELET # BLD AUTO: 334 K/UL (ref 150–450)
PMV BLD AUTO: 9.8 FL (ref 9.2–12.9)
PO2 BLDA: 33 MMHG (ref 40–60)
POC SATURATED O2: 57 % (ref 95–100)
POCT GLUCOSE: 197 MG/DL (ref 70–110)
POCT GLUCOSE: 318 MG/DL (ref 70–110)
POCT GLUCOSE: 330 MG/DL (ref 70–110)
POCT GLUCOSE: 416 MG/DL (ref 70–110)
POTASSIUM SERPL-SCNC: 4 MMOL/L (ref 3.5–5.1)
PREALB SERPL-MCNC: 16 MG/DL (ref 20–43)
PROT SERPL-MCNC: 7.7 G/DL (ref 6–8.4)
PROTHROMBIN TIME: 26.1 SEC (ref 9–12.5)
RBC # BLD AUTO: 3.03 M/UL (ref 4.6–6.2)
SAMPLE: ABNORMAL
SAMPLE: NORMAL
SITE: ABNORMAL
SITE: NORMAL
SODIUM SERPL-SCNC: 134 MMOL/L (ref 136–145)
WBC # BLD AUTO: 10.12 K/UL (ref 3.9–12.7)

## 2024-06-21 PROCEDURE — 27000248 HC VAD-ADDITIONAL DAY

## 2024-06-21 PROCEDURE — 80076 HEPATIC FUNCTION PANEL: CPT | Performed by: STUDENT IN AN ORGANIZED HEALTH CARE EDUCATION/TRAINING PROGRAM

## 2024-06-21 PROCEDURE — 20600001 HC STEP DOWN PRIVATE ROOM

## 2024-06-21 PROCEDURE — 97535 SELF CARE MNGMENT TRAINING: CPT

## 2024-06-21 PROCEDURE — 86140 C-REACTIVE PROTEIN: CPT | Performed by: STUDENT IN AN ORGANIZED HEALTH CARE EDUCATION/TRAINING PROGRAM

## 2024-06-21 PROCEDURE — 25000003 PHARM REV CODE 250: Performed by: NURSE PRACTITIONER

## 2024-06-21 PROCEDURE — 25000003 PHARM REV CODE 250: Performed by: STUDENT IN AN ORGANIZED HEALTH CARE EDUCATION/TRAINING PROGRAM

## 2024-06-21 PROCEDURE — 94799 UNLISTED PULMONARY SVC/PX: CPT

## 2024-06-21 PROCEDURE — 63600175 PHARM REV CODE 636 W HCPCS: Mod: JZ | Performed by: NURSE PRACTITIONER

## 2024-06-21 PROCEDURE — 25000003 PHARM REV CODE 250: Performed by: INTERNAL MEDICINE

## 2024-06-21 PROCEDURE — 99900035 HC TECH TIME PER 15 MIN (STAT)

## 2024-06-21 PROCEDURE — 63600175 PHARM REV CODE 636 W HCPCS: Performed by: NURSE PRACTITIONER

## 2024-06-21 PROCEDURE — 83880 ASSAY OF NATRIURETIC PEPTIDE: CPT | Performed by: STUDENT IN AN ORGANIZED HEALTH CARE EDUCATION/TRAINING PROGRAM

## 2024-06-21 PROCEDURE — 97112 NEUROMUSCULAR REEDUCATION: CPT | Mod: CQ

## 2024-06-21 PROCEDURE — 25000003 PHARM REV CODE 250: Performed by: REGISTERED NURSE

## 2024-06-21 PROCEDURE — 99232 SBSQ HOSP IP/OBS MODERATE 35: CPT | Mod: ,,, | Performed by: NURSE PRACTITIONER

## 2024-06-21 PROCEDURE — 80048 BASIC METABOLIC PNL TOTAL CA: CPT | Performed by: STUDENT IN AN ORGANIZED HEALTH CARE EDUCATION/TRAINING PROGRAM

## 2024-06-21 PROCEDURE — 83615 LACTATE (LD) (LDH) ENZYME: CPT | Performed by: STUDENT IN AN ORGANIZED HEALTH CARE EDUCATION/TRAINING PROGRAM

## 2024-06-21 PROCEDURE — 25000003 PHARM REV CODE 250: Performed by: PHYSICIAN ASSISTANT

## 2024-06-21 PROCEDURE — 97110 THERAPEUTIC EXERCISES: CPT

## 2024-06-21 PROCEDURE — 92526 ORAL FUNCTION THERAPY: CPT

## 2024-06-21 PROCEDURE — 99233 SBSQ HOSP IP/OBS HIGH 50: CPT | Mod: ,,, | Performed by: PHYSICIAN ASSISTANT

## 2024-06-21 PROCEDURE — 85610 PROTHROMBIN TIME: CPT | Performed by: STUDENT IN AN ORGANIZED HEALTH CARE EDUCATION/TRAINING PROGRAM

## 2024-06-21 PROCEDURE — 93750 INTERROGATION VAD IN PERSON: CPT | Mod: ,,, | Performed by: INTERNAL MEDICINE

## 2024-06-21 PROCEDURE — 85025 COMPLETE CBC W/AUTO DIFF WBC: CPT | Performed by: STUDENT IN AN ORGANIZED HEALTH CARE EDUCATION/TRAINING PROGRAM

## 2024-06-21 PROCEDURE — 97530 THERAPEUTIC ACTIVITIES: CPT | Mod: CQ

## 2024-06-21 PROCEDURE — 84100 ASSAY OF PHOSPHORUS: CPT | Performed by: STUDENT IN AN ORGANIZED HEALTH CARE EDUCATION/TRAINING PROGRAM

## 2024-06-21 PROCEDURE — 99233 SBSQ HOSP IP/OBS HIGH 50: CPT | Mod: ,,, | Performed by: HOSPITALIST

## 2024-06-21 PROCEDURE — 85730 THROMBOPLASTIN TIME PARTIAL: CPT | Performed by: STUDENT IN AN ORGANIZED HEALTH CARE EDUCATION/TRAINING PROGRAM

## 2024-06-21 PROCEDURE — 84134 ASSAY OF PREALBUMIN: CPT | Performed by: STUDENT IN AN ORGANIZED HEALTH CARE EDUCATION/TRAINING PROGRAM

## 2024-06-21 PROCEDURE — 83735 ASSAY OF MAGNESIUM: CPT | Performed by: STUDENT IN AN ORGANIZED HEALTH CARE EDUCATION/TRAINING PROGRAM

## 2024-06-21 PROCEDURE — 83605 ASSAY OF LACTIC ACID: CPT

## 2024-06-21 RX ORDER — SODIUM BICARBONATE 650 MG/1
650 TABLET ORAL 3 TIMES DAILY
Status: DISCONTINUED | OUTPATIENT
Start: 2024-06-21 | End: 2024-07-01

## 2024-06-21 RX ORDER — INSULIN ASPART 100 [IU]/ML
0-5 INJECTION, SOLUTION INTRAVENOUS; SUBCUTANEOUS EVERY 4 HOURS PRN
Status: DISCONTINUED | OUTPATIENT
Start: 2024-06-21 | End: 2024-06-23

## 2024-06-21 RX ORDER — QUETIAPINE FUMARATE 25 MG/1
100 TABLET, FILM COATED ORAL NIGHTLY
Status: DISCONTINUED | OUTPATIENT
Start: 2024-06-21 | End: 2024-07-01 | Stop reason: HOSPADM

## 2024-06-21 RX ORDER — SODIUM,POTASSIUM PHOSPHATES 280-250MG
2 POWDER IN PACKET (EA) ORAL ONCE
Status: COMPLETED | OUTPATIENT
Start: 2024-06-21 | End: 2024-06-21

## 2024-06-21 RX ORDER — INSULIN ASPART 100 [IU]/ML
4 INJECTION, SOLUTION INTRAVENOUS; SUBCUTANEOUS
Status: DISCONTINUED | OUTPATIENT
Start: 2024-06-21 | End: 2024-06-21

## 2024-06-21 RX ORDER — INSULIN ASPART 100 [IU]/ML
3 INJECTION, SOLUTION INTRAVENOUS; SUBCUTANEOUS
Status: DISCONTINUED | OUTPATIENT
Start: 2024-06-21 | End: 2024-06-21

## 2024-06-21 RX ORDER — INSULIN ASPART 100 [IU]/ML
5 INJECTION, SOLUTION INTRAVENOUS; SUBCUTANEOUS
Status: DISCONTINUED | OUTPATIENT
Start: 2024-06-21 | End: 2024-06-23

## 2024-06-21 RX ADMIN — AMIODARONE HYDROCHLORIDE 400 MG: 200 TABLET ORAL at 08:06

## 2024-06-21 RX ADMIN — SODIUM BICARBONATE 650 MG TABLET 650 MG: at 08:06

## 2024-06-21 RX ADMIN — ATORVASTATIN CALCIUM 40 MG: 40 TABLET, FILM COATED ORAL at 09:06

## 2024-06-21 RX ADMIN — INSULIN ASPART 4 UNITS: 100 INJECTION, SOLUTION INTRAVENOUS; SUBCUTANEOUS at 11:06

## 2024-06-21 RX ADMIN — AMOXICILLIN 500 MG: 400 POWDER, FOR SUSPENSION ORAL at 08:06

## 2024-06-21 RX ADMIN — GABAPENTIN 100 MG: 100 CAPSULE ORAL at 08:06

## 2024-06-21 RX ADMIN — QUETIAPINE FUMARATE 100 MG: 25 TABLET ORAL at 09:06

## 2024-06-21 RX ADMIN — MIDODRINE HYDROCHLORIDE 10 MG: 5 TABLET ORAL at 01:06

## 2024-06-21 RX ADMIN — WARFARIN SODIUM 2.5 MG: 2.5 TABLET ORAL at 04:06

## 2024-06-21 RX ADMIN — SODIUM PHOSPHATE, MONOBASIC, MONOHYDRATE AND SODIUM PHOSPHATE, DIBASIC, ANHYDROUS 30 MMOL: 142; 276 INJECTION, SOLUTION INTRAVENOUS at 07:06

## 2024-06-21 RX ADMIN — MIDODRINE HYDROCHLORIDE 10 MG: 5 TABLET ORAL at 09:06

## 2024-06-21 RX ADMIN — CHOLECALCIFEROL TAB 25 MCG (1000 UNIT) 1000 UNITS: 25 TAB at 08:06

## 2024-06-21 RX ADMIN — TRAZODONE HYDROCHLORIDE 25 MG: 50 TABLET ORAL at 10:06

## 2024-06-21 RX ADMIN — INSULIN ASPART 5 UNITS: 100 INJECTION, SOLUTION INTRAVENOUS; SUBCUTANEOUS at 05:06

## 2024-06-21 RX ADMIN — PANTOPRAZOLE SODIUM 40 MG: 40 TABLET, DELAYED RELEASE ORAL at 08:06

## 2024-06-21 RX ADMIN — GABAPENTIN 100 MG: 100 CAPSULE ORAL at 09:06

## 2024-06-21 RX ADMIN — INSULIN ASPART 2 UNITS: 100 INJECTION, SOLUTION INTRAVENOUS; SUBCUTANEOUS at 10:06

## 2024-06-21 RX ADMIN — SODIUM BICARBONATE 650 MG TABLET 650 MG: at 04:06

## 2024-06-21 RX ADMIN — INSULIN ASPART 10 UNITS: 100 INJECTION, SOLUTION INTRAVENOUS; SUBCUTANEOUS at 09:06

## 2024-06-21 RX ADMIN — MIDODRINE HYDROCHLORIDE 10 MG: 5 TABLET ORAL at 05:06

## 2024-06-21 RX ADMIN — AMOXICILLIN 500 MG: 400 POWDER, FOR SUSPENSION ORAL at 10:06

## 2024-06-21 RX ADMIN — INSULIN ASPART 5 UNITS: 100 INJECTION, SOLUTION INTRAVENOUS; SUBCUTANEOUS at 09:06

## 2024-06-21 RX ADMIN — ERYTHROPOIETIN 4000 UNITS: 4000 INJECTION, SOLUTION INTRAVENOUS; SUBCUTANEOUS at 11:06

## 2024-06-21 RX ADMIN — POTASSIUM & SODIUM PHOSPHATES POWDER PACK 280-160-250 MG 2 PACKET: 280-160-250 PACK at 05:06

## 2024-06-21 RX ADMIN — VENLAFAXINE 37.5 MG: 37.5 TABLET ORAL at 08:06

## 2024-06-21 RX ADMIN — FOLIC ACID 1 MG: 1 TABLET ORAL at 08:06

## 2024-06-21 RX ADMIN — SODIUM BICARBONATE 650 MG TABLET 650 MG: at 09:06

## 2024-06-21 NOTE — PROGRESS NOTES
[facility] - Cardiac Intensive Care  Nephrology  Progress Note    Patient Name: Radha Abbott  MRN: [id number]  Admission Date: 6/11/2024  Hospital Length of Stay: 10 days  Attending Provider: Sajan Hurley, *   Primary Care Physician: Vasu Kong MD  Principal Problem:Acute renal failure superimposed on stage 4 chronic kidney disease    Subjective:     HPI: Mr. Abbott is a 64-year-old man with ischemic cardiomyopathy with most recent TTE showing EF 10 -15% and G3DD s/p [employer] ICM placement previously on chronic dobutamine infusion however now with Heartmate III LVAD placed on 12/01/2023, CAD s/p x3 vessel CABG back in 2009, type 2 diabetes mellitus (most recent hemoglobin A1c 7.4%), hypertension, HLD, history of ventricular fibrillation for which he is on amiodarone, chronic AC with Coumadin, advanced CKD V (baseline creatinine ~6) with recurrent AKIs and previous dialysis dependence (tunneled HD catheter removed in early March of this year). He had a recent hospitalization where he was treated for fluid overload acute on chronic CHF exacerbation and IVÁN thought to be 2/2 type 2 CRS and was subsequently diuresed and discharged with a creatinine of 6.2. He presents to the hospital on 6/11 for concerns of worsening fatigue that started one day prior to him arriving to the hospital, and has had difficulties getting out of bed. He has also noted a decrease in his urine output over the past 36 hours despite Diuretic use. He admits adherence to his Metolazone and Bumex at home, however has not passed any urine in the past 10 hours. Bed side bladder scan revealed 200 mL of urine in his bladder. Upon arrival to the ER, his is hemodynamically stable, Labs showed Hgb of 6.3, electrolytes within acceptable limits, Serum CO2 of 21, BUN of 118, and a creatinine of 9.8. He denies feeling fluid overloaded or short of breath, reports good appetite, denies nausea, vomiting, or metallic taste in his mouth. Chest xray  showed mild central vascular congestion and interstitial pulmonary opacities which appear improved compared to previous studies. CT abdomen/Pelvis without contrast revealed Kidneys that were reported as normal in size and location, and without hydronephrosis or ureteral dilation.     Interval History:   HD completed yesterday, tolerated well with 2L net fluid removal.  Sleeping on exam this morning, wife at bedside reports agitation overnight and did not sleep well.   AM labs with HAGMA, bicarb 16.  LA 0.9.  CVP 3.  Phos low;  IV phos replacement ordered    Review of patient's allergies indicates:  No Known Allergies  Current Facility-Administered Medications   Medication Frequency    0.9%  NaCl infusion (for blood administration) Q24H PRN    0.9%  NaCl infusion Continuous    0.9%  NaCl infusion PRN    0.9%  NaCl infusion Once    acetaminophen tablet 650 mg Q6H PRN    acetaminophen tablet 650 mg Q6H PRN    amiodarone tablet 400 mg Daily    amoxicillin 400 mg/5 mL suspension 500 mg Q12H    atorvastatin tablet 40 mg QHS    dextrose 10 % infusion Continuous PRN    dextrose 10% bolus 125 mL 125 mL PRN    dextrose 10% bolus 250 mL 250 mL PRN    epoetin zohaib injection 4,000 Units Every Mon, Wed, Fri    folic acid tablet 1 mg Daily    gabapentin capsule 100 mg BID    glucagon (human recombinant) injection 1 mg PRN    insulin aspart U-100 pen 0-5 Units Q4H PRN    insulin aspart U-100 pen 4 Units 6 times per day    LIDOcaine 5 % patch 2 patch Q24H    midodrine tablet 10 mg Q8H    pantoprazole EC tablet 40 mg Daily    QUEtiapine tablet 50 mg QHS    simethicone chewable tablet 80 mg TID PRN    sodium bicarbonate tablet 650 mg TID    sodium phosphate 30 mmol in D5W 250 mL IVPB Once    trazodone split tablet 25 mg Nightly PRN    venlafaxine tablet 37.5 mg Daily    vitamin D 1000 units tablet 1,000 Units Daily    warfarin (COUMADIN) tablet 2.5 mg Daily       Objective:     Vital Signs (Most Recent):  Temp: 98.5 °F (36.9 °C)  (06/21/24 0700)  Pulse: 91 (06/21/24 1000)  Resp: 19 (06/21/24 1000)  BP: (!) 87/59 (06/21/24 1000)  SpO2: 99 % (06/21/24 0700) Vital Signs (24h Range):  Temp:  [97.5 °F (36.4 °C)-98.5 °F (36.9 °C)] 98.5 °F (36.9 °C)  Pulse:  [] 91  Resp:  [16-48] 19  SpO2:  [94 %-99 %] 99 %  BP: ()/(0-83) 87/59     Weight: 76.3 kg (168 lb 3.4 oz) (06/20/24 0601)  Body mass index is 22.19 kg/m².  Body surface area is 1.98 meters squared.    I/O last 3 completed shifts:  In: 2643.2 [P.O.:80; I.V.:154.2; NG/GT:2310; IV Piggyback:99]  Out: 2350 [Other:2350]     Physical Exam  Vitals and nursing note reviewed.   Constitutional:       General: He is sleeping.      Appearance: Normal appearance.   HENT:      Head: Normocephalic and atraumatic.   Eyes:      Conjunctiva/sclera: Conjunctivae normal.      Pupils: Pupils are equal, round, and reactive to light.   Neck:      Comments: RIJ trialysis cath.   Cardiovascular:      Rate and Rhythm: Normal rate and regular rhythm.      Comments: Smooth VAD hum  Pulmonary:      Effort: Pulmonary effort is normal. No respiratory distress.      Breath sounds: Normal breath sounds. No wheezing or rales.   Abdominal:      General: Bowel sounds are normal.      Palpations: Abdomen is soft.   Musculoskeletal:         General: No swelling. Normal range of motion.      Cervical back: Normal range of motion and neck supple.      Right lower leg: No edema.      Left lower leg: No edema.   Skin:     General: Skin is warm and dry.   Neurological:      General: No focal deficit present.          Significant Labs:  CBC:   Recent Labs   Lab 06/21/24  0451   WBC 10.12   RBC 3.03*   HGB 8.4*   HCT 26.5*      MCV 88   MCH 27.7   MCHC 31.7*     CMP:   Recent Labs   Lab 06/21/24  0451   *   CALCIUM 8.7   ALBUMIN 2.1*   PROT 7.7   *   K 4.0   CO2 16*      BUN 54*   CREATININE 3.7*   ALKPHOS 387*   *   AST 67*   BILITOT 0.4      Assessment/Plan:     Cardiac/Vascular  LVAD (left  ventricular assist device) present  -HTS following.    Acute on chronic combined systolic and diastolic heart failure  UF with HD    Renal/  * Acute renal failure superimposed on stage 4 chronic kidney disease  Patient with a baseline eGFR that appears to be variable however was noted to be in the 15-20 range in February before his recent IVÁN in May, presents now with fatigue and worsening IVÁN with a creatinine of 9.8 and eGFR of 5.5. BUN was noted to be elevated at 117, other electrolytes were normal. He was noted to have some asterixis on exam (which he states is chronic for him and unchanged from his baseline), however denies any other signs or symptoms of uremia. Bedside bladder scan revealed 200 mL of urine in his bladder and his last bladder void was 10 hours prior to this consult.     SLED initiated 6/13 x4 hours.  Mental status improved.      Recommendations:     -will continue 3x week dialysis   -next session tomorrow  -add on Na bicarb tabs 650 mg po TID  -Avoid nephrotoxic agents (IV contrast, NSAID's, gadolinium contrast, PPI's) if able.   -Strict I's and O's  -Renally dose all medications  -will need OP HD upon discharge, recommend CM/SW involvement to start workup.  -Please consult IR for TDC Placement    Oncology  Anemia  -EPO with HD  -Feraheme 6/19      Norm Vidal NP  Nephrology  [facility] - Cardiac Intensive Care

## 2024-06-21 NOTE — ASSESSMENT & PLAN NOTE
BG goal 140-180    Tube feeds now at goal. Prandial excursions noted.     Plan:  -Start Novolog 5 units q 4 hrs while on tube feeds (hold if tube feeds are stopped for any reason or BG < 100)   -Novolog /25 (Started due to TF and BG excursions)  -BG monitoring q4hr  -Hypoglycemia protocol in place    ** Please call Endocrine for any BG related issues **    Discharge plans:   TBD  -D/C Amaryl

## 2024-06-21 NOTE — CONSULTS
Tunneled Dialysis Catheter Placement Consult Note  Interventional Radiology      Inpatient consult to Interventional Radiology  Consult performed by: Linda Avalos PA-C  Consult ordered by: iJmy An PA-C            SUBJECTIVE:     Chief Complaint:  TDC placement for outpatient HD    History of Present Illness:  Radha Abbott is a 64 y.o. male with a past medical history of  stage D HFrEF, ICMP, LVAD on coumadin, and renal failure requiring HD  who was admitted on 6/12/24 for ARF.      Interventional Radiology consulted for a tunneled catheter placement for dialysis     Review of Systems   Constitutional: Negative.    HENT: Negative.     Respiratory: Negative.     Cardiovascular: Negative.    Gastrointestinal: Negative.    Musculoskeletal: Negative.    Skin: Negative.    Neurological: Negative.    Psychiatric/Behavioral: Negative.          Scheduled Meds:   0.9% NaCl   Intravenous Once    amiodarone  400 mg Oral Daily    amoxicillin  500 mg Per NG tube Q12H    atorvastatin  40 mg Oral QHS    epoetin zohaib (PROCRIT) injection  4,000 Units Intravenous Every Mon, Wed, Fri    folic acid  1 mg Oral Daily    gabapentin  100 mg Oral BID    insulin aspart U-100  5 Units Subcutaneous 6 times per day    LIDOcaine  2 patch Transdermal Q24H    midodrine  10 mg Oral Q8H    pantoprazole  40 mg Oral Daily    QUEtiapine  100 mg Oral QHS    sodium bicarbonate  650 mg Oral TID    venlafaxine  37.5 mg Oral Daily    vitamin D  1,000 Units Oral Daily    warfarin  2.5 mg Oral Daily     Continuous Infusions:   0.9% NaCl   Intravenous Continuous   Stopped at 06/13/24 0117    D10W   Intravenous Continuous PRN         PRN Meds:  Current Facility-Administered Medications:     0.9%  NaCl infusion (for blood administration), , Intravenous, Q24H PRN    0.9% NaCl, , Intravenous, PRN    acetaminophen, 650 mg, Oral, Q6H PRN    acetaminophen, 650 mg, Per NG tube, Q6H PRN    D10W, , Intravenous, Continuous PRN    dextrose 10%, 12.5 g, Intravenous,  PRN    dextrose 10%, 25 g, Intravenous, PRN    glucagon (human recombinant), 1 mg, Intramuscular, PRN    insulin aspart U-100, 0-5 Units, Subcutaneous, Q4H PRN    simethicone, 1 tablet, Oral, TID PRN    traZODone, 25 mg, Oral, Nightly PRN    Review of patient's allergies indicates:  No Known Allergies    Past Medical History:   Diagnosis Date    CAD (coronary artery disease)     CHF (congestive heart failure)     Diabetes mellitus     HFrEF (heart failure with reduced ejection fraction)     Hypoglycemia 06/12/2024    ICD (implantable cardioverter-defibrillator) in place     MI, old     Type 2 diabetes mellitus without complication, without long-term current use of insulin 10/07/2023     Past Surgical History:   Procedure Laterality Date    ANGIOPLASTY-VENOUS ARTERY Right 12/1/2023    Procedure: ANGIOPLASTY-VENOUS ARTERY, RIGHT FEMORAL;  Surgeon: Yuri Washington MD;  [facility];  Service: Cardiovascular;  Laterality: Right;    AORTIC VALVULOPLASTY N/A 12/1/2023    Procedure: REPAIR, AORTIC VALVE;  Surgeon: Yuri Washington MD;  [facility];  Service: Cardiovascular;  Laterality: N/A;    CARDIAC SURGERY      CLOSURE N/A 12/1/2023    Procedure: CLOSURE, TEMPORARY;  Surgeon: Yuri Washington MD;  [facility];  Service: Cardiovascular;  Laterality: N/A;    DRAINAGE OF PLEURAL EFFUSION  12/4/2023    Procedure: DRAINAGE, PLEURAL EFFUSION;  Surgeon: Yuri Washington MD;  [facility];  Service: Cardiovascular;;    INSERTION OF GRAFT TO PERICARDIUM  12/4/2023    Procedure: INSERTION, GRAFT, PERICARDIUM;  Surgeon: Yuri Washington MD;  [facility];  Service: Cardiovascular;;    INSERTION OF INTRA-AORTIC BALLOON ASSIST DEVICE Right 11/21/2023    Procedure: INSERTION, INTRA-AORTIC BALLOON PUMP;  Surgeon: Finn Cohn MD;  Location: [facility] CATH LAB;  Service: Cardiology;  Laterality: Right;    LEFT VENTRICULAR ASSIST DEVICE Left 12/1/2023    Procedure:  INSERTION-LEFT VENTRICULAR ASSIST DEVICE;  Surgeon: Yuri Washington MD;  Location: [facility] OR [facility] FLR;  Service: Cardiovascular;  Laterality: Left;  REDO STERNOTOMY - REDO SAW NEEDED FOR CASE    LYSIS OF ADHESIONS  12/1/2023    Procedure: LYSIS, ADHESIONS;  Surgeon: Yuri Washington MD;  Location: [facility] OR [facility] FLR;  Service: Cardiovascular;;    PLACEMENT OF SWAN ROLANDO CATHETER WITH IMAGING GUIDANCE  11/20/2023    Procedure: INSERTION, CATHETER, SWAN-ROLANDO, WITH IMAGING GUIDANCE;  Surgeon: Sajan Hurley MD;  Location: [facility] CATH LAB;  Service: Cardiology;;    REMOVAL OF TUNNELED CENTRAL VENOUS CATHETER (CVC) N/A 3/1/2024    Procedure: REMOVAL, CATHETER, CENTRAL VENOUS, TUNNELED;  Surgeon: Seble Aguilar MD;  Location: [facility] CATH LAB;  Service: Interventional Nephrology;  Laterality: N/A;    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 12/1/2023    Procedure: REPAIR, ANEURYSM, ARTERY, FEMORAL;  Surgeon: Yuri Washington MD;  [facility];  Service: Cardiovascular;  Laterality: Right;  Right Femoral Artery Repair    RIGHT HEART CATHETERIZATION Right 10/10/2023    Procedure: INSERTION, CATHETER, RIGHT HEART;  Surgeon: Bin Gandhi MD;  Location: [facility] CATH LAB;  Service: Cardiology;  Laterality: Right;    RIGHT HEART CATHETERIZATION Right 10/13/2023    Procedure: INSERTION, CATHETER, RIGHT HEART;  Surgeon: Walter Mcitnyre MD;  Location: [facility] CATH LAB;  Service: Cardiology;  Laterality: Right;    RIGHT HEART CATHETERIZATION  11/13/2023    RIGHT HEART CATHETERIZATION Right 11/13/2023    Procedure: INSERTION, CATHETER, RIGHT HEART;  Surgeon: Juventino Bermudez Jr., MD;  Location: [facility] CATH LAB;  Service: Cardiology;  Laterality: Right;    RIGHT HEART CATHETERIZATION Right 11/20/2023    Procedure: INSERTION, CATHETER, RIGHT HEART;  Surgeon: Sajan Hurley MD;  Location: [facility] CATH LAB;  Service: Cardiology;  Laterality: Right;    RIGHT HEART CATHETERIZATION Right 1/22/2024    Procedure: INSERTION,  CATHETER, RIGHT HEART;  Surgeon: Brayan Ocampo MD;  Location: [facility] CATH LAB;  Service: Cardiology;  Laterality: Right;    STERNAL WOUND CLOSURE N/A 12/4/2023    Procedure: CLOSURE, WOUND, STERNUM;  Surgeon: Yuri Washington MD;  Location: [facility] OR [facility] FLR;  Service: Cardiovascular;  Laterality: N/A;    STERNOTOMY N/A 12/1/2023    Procedure: STERNOTOMY, REDO;  Surgeon: Yuri Washington MD;  Location: [facility] OR [facility] FLR;  Service: Cardiovascular;  Laterality: N/A;    VALVULOPLASTY, MITRAL VALVE N/A 12/1/2023    Procedure: VALVULOPLASTY, MITRAL VALVE;  Surgeon: Yuri Washington MD;  Location: [facility] OR [facility] FLR;  Service: Cardiovascular;  Laterality: N/A;     No family history on file.  Social History     Tobacco Use    Smoking status: Former     Current packs/day: 0.50     Types: Cigarettes   Substance Use Topics    Alcohol use: Yes     Comment: rarely    Drug use: No         Anticoagulants/Antiplatelets:   Warfarin   OBJECTIVE:     Vital Signs (Most Recent)  Temp: 97.6 °F (36.4 °C) (06/21/24 1100)  Pulse: 91 (06/21/24 1100)  Resp: 19 (06/21/24 1100)  BP: (!) 87/59 (06/21/24 1400)  SpO2: 99 % (06/21/24 1100)    Physical Exam:   Physical Exam  Constitutional:       General: He is not in acute distress.  HENT:      Head: Normocephalic.      Nose:      Comments: NG in place  Cardiovascular:      Comments: LVAD  Pulmonary:      Effort: Pulmonary effort is normal.   Abdominal:      General: Abdomen is flat.   Skin:     Comments: R temp line in place   Neurological:      Mental Status: He is alert and oriented to person, place, and time. Mental status is at baseline.   Psychiatric:         Mood and Affect: Mood normal.         Behavior: Behavior normal.         Laboratory  Lab Results   Component Value Date    INR 2.5 (H) 06/21/2024       Lab Results   Component Value Date    WBC 10.12 06/21/2024    HGB 8.4 (L) 06/21/2024    HCT 26.5 (L) 06/21/2024    MCV 88 06/21/2024     06/21/2024      Lab Results   Component Value Date      (H) 06/21/2024     (L) 06/21/2024    K 4.0 06/21/2024     06/21/2024    CO2 16 (L) 06/21/2024    BUN 54 (H) 06/21/2024    CREATININE 3.7 (H) 06/21/2024    CALCIUM 8.7 06/21/2024    MG 2.5 06/21/2024     (H) 06/21/2024    AST 67 (H) 06/21/2024    ALBUMIN 2.1 (L) 06/21/2024    BILITOT 0.4 06/21/2024    BILIDIR 0.2 06/21/2024       ASA/Mallampati  ASA: per anes  Mallampati: per anes      ASSESSMENT/PLAN:     Assessment:  64 y.o. male who has been referred to IR for tunneled catheter placement for dialysis.     Plan:  Will proceed with tunneled dialysis catheter placement on Monday 6/24/24.   Sedation plan - anesthesia. Patient with LVAD.   Please keep pt NPO starting at midnight on day of procedure.   Anticoagulation history reviewed.   Coagulation labs reviewed.  Thank you for the consult. Please contact with questions via [employer] secure chat.    Linda Avalos PA-C  Interventional Radiology  Spectra [phone]  6/21/2024

## 2024-06-21 NOTE — CARE UPDATE
Heart Transplant Care Update Note:    Hemodynamics@8pm:  CVP:2  SVO2:45   CO: 4.6  CI: 2.3  SVR: 1215    [id number]    Continuous Infusions:   0.9% NaCl   Intravenous Continuous   Stopped at 06/13/24 0117    D10W   Intravenous Continuous PRN           Intake/Output Summary (Last 24 hours) at 6/20/2024 2135  Last data filed at 6/20/2024 2100  Gross per 24 hour   Intake 1728.15 ml   Output 2350 ml   Net -621.85 ml           Plan:  No changes. Continue current management       Discussed with [facility] Attending      Erin CONRAD MD  Cardiovascular Disease PGY IV  [facility]

## 2024-06-21 NOTE — PROGRESS NOTES
[facility] - Cardiac Intensive Care  Endocrinology  Progress Note    Admit Date: 6/11/2024     Reason for Consult: Management of T2DM, Hyperglycemia     Surgical Procedure and Date: HM3 placement as DT (12/1/23)     Diabetes diagnosis year: several years ago    Home Diabetes Medications:  Amaryl 1 mg (recently prescribed by PCP about a week ago)     Lab Results   Component Value Date    HGBA1C 7.4 (H) 04/17/2024       How often checking glucose at home? Once daily in the AM ( prior to starting Amaryl BG in the 300s, since starting Amaryl BG 70-130s)   BG readings on regimen: 70-130s  Hypoglycemia on the regimen?  No  Missed doses on regimen?  No    Diabetes Complications include:     Hypoglycemia , Diabetic nephropathy  , Diabetic chronic kidney disease     , and Diabetic peripheral neuropathy     Complicating diabetes co morbidities:   CHF and CKD      HPI:   Patient is a 64 y.o. male with a diagnosis of stage D HFrEF, ICMP who underwent HM3 placement as DT (12/1/23) w/ a hospital stay complicated by vasoplegia(requiring Giaprezza), debility, malnutrition/impaired swallowing, and renal failure requiring HD (since weaned off) w/ recent discharge for ADHF presented to the ED with wife due to c/o worsening generalized weakness since 6/10. Per wife patient was mostly sleeping during the day despite sleeping 10 hrs at night. Patient also admits decreased appetite and decreased urine output despite taking his medications as prescribed. He has chronic shortness of breath on exertion. Denies confusion, syncope, diarrhea, constipation, N/V, dysuria, or other complaints. Patient had his prior tunnel cath removed used for HD about 2 weeks ago. He completed a 6 week therapy for Enterococcus bacteremia with ampicillin and rocephin ending 5/30/24 and was switched to PO antibiotics. Off note, patient was recently started by his PCP on glimepiride 1mg and unclear of BG trends during the day. Per patient last fasting  on 6/11.  "Patient reports lasting taking the glimepiride on  AM.   Admitted for worsening renal function with a BUN>100/Cr. 9.9. Endocrinology consulted for management of T2DM.          Interval HPI:   Overnight events: NAEON. Remains in CICU. BG above goal ranges on current prn SQ insulin correction scale. Tube feeds now at goal rate of 60 ml/hr. Creatinine 3.7. Diet Minced & Moist (IDDSI Level 5) Fluid - 1500mL    Eatin%  Nausea: No  Hypoglycemia and intervention: No  Fever: No  TPN and/or TF: Yes  If yes, type of TF/TPN and rate: TFs at 60 ml/hr     BP (!) 76/0 (BP Location: Right arm, Patient Position: Lying)   Pulse 87   Temp 98.2 °F (36.8 °C) (Oral)   Resp 20   Ht 6' 1" (1.854 m)   Wt 76.3 kg (168 lb 3.4 oz)   SpO2 97%   BMI 22.19 kg/m²     Labs Reviewed and Include    Recent Labs   Lab 24  0451   *   CALCIUM 8.7   ALBUMIN 2.1*   PROT 7.7   *   K 4.0   CO2 16*      BUN 54*   CREATININE 3.7*   ALKPHOS 387*   *   AST 67*   BILITOT 0.4     Lab Results   Component Value Date    WBC 10.12 2024    HGB 8.4 (L) 2024    HCT 26.5 (L) 2024    MCV 88 2024     2024     No results for input(s): "TSH", "FREET4" in the last 168 hours.  Lab Results   Component Value Date    HGBA1C 7.4 (H) 2024       Nutritional status:   Body mass index is 22.19 kg/m².  Lab Results   Component Value Date    ALBUMIN 2.1 (L) 2024    ALBUMIN 2.1 (L) 2024    ALBUMIN 2.4 (L) 2024     Lab Results   Component Value Date    PREALBUMIN 16 (L) 2024    PREALBUMIN 14 (L) 2024    PREALBUMIN 15 (L) 2024       Estimated Creatinine Clearance: 22.3 mL/min (A) (based on SCr of 3.7 mg/dL (H)).    Accu-Checks  Recent Labs     24  1358 24  1810 24  2018 24  0305 24  0801 24  0908 24  1245 24  1748 24  0920   POCTGLUCOSE 229* 186* 185* 183* 255* 274* 348* 173* 208* 416* "       Current Medications and/or Treatments Impacting Glycemic Control  Immunotherapy:    Immunosuppressants       None          Steroids:   Hormones (From admission, onward)      None          Pressors:    Autonomic Drugs (From admission, onward)      Start     Stop Route Frequency Ordered    06/19/24 1415  midodrine tablet 10 mg         -- Oral Every 8 hours 06/19/24 1414          Hyperglycemia/Diabetes Medications:   Antihyperglycemics (From admission, onward)      Start     Stop Route Frequency Ordered    06/21/24 1200  insulin aspart U-100 pen 3 Units         -- SubQ 6 times per day 06/21/24 0940    06/21/24 1041  insulin aspart U-100 pen 0-5 Units         -- SubQ Every 4 hours PRN 06/21/24 0941            ASSESSMENT and PLAN    Cardiac/Vascular  PAF (paroxysmal atrial fibrillation)  May increase insulin resistance.         Renal/  * Acute renal failure superimposed on stage 4 chronic kidney disease  Titrate insulin slowly to avoid hypoglycemia as the risk of hypoglycemia increases with decreased creatinine clearance.        Endocrine  Type 2 diabetes mellitus without complication, without long-term current use of insulin  BG goal 140-180    Tube feeds now at goal. Prandial excursions noted.     Plan:  -Start Novolog 5 units q 4 hrs while on tube feeds (hold if tube feeds are stopped for any reason or BG < 100)   -Novolog /25 (Started due to TF and BG excursions)  -BG monitoring q4hr  -Hypoglycemia protocol in place    ** Please call Endocrine for any BG related issues **    Discharge plans:   TBD  -D/C Camila Escobar NP  Endocrinology  [facility] - Cardiac Intensive Care

## 2024-06-21 NOTE — NURSING TRANSFER
Nursing Transfer Note      6/21/2024   3:23 PM    Nurse giving handoff: KYAW Thornton  Nurse receiving handoff: KYAW Sinha    Reason patient is being transferred: Stable for step-down    Transfer To: [facility] 319    Transfer From: [facility] 3084    Transfer via bed    Transfer with cardiac monitoring    Transported by KYAW Thornton; KYAW Goodwin    Transfer Vital Signs:  Blood Pressure:87/59  Heart Rate:83  Temperature:97.5  Respirations:19    Telemetry: Yes  Order for Tele Monitor? Yes    Additional Lines: N/A    4eyes on Skin: yes    Medicines sent: Novalog     Any special needs or follow-up needed: N/A    Patient belongings transferred with patient: Yes    Chart send with patient: Yes    Notified: spouse    Patient reassessed at: 1415 (date, time)  1  Upon arrival to floor: cardiac monitor applied, patient oriented to room, call bell in reach, and bed in lowest position    Nurses Note -- 4 Eyes      6/21/2024   3:29 PM      Skin assessed during: Transfer      [] No Altered Skin Integrity Present    []Prevention Measures Documented      [x] Yes- Altered Skin Integrity Present or Discovered   [] LDA Added if Not in Epic (Describe Wound)   [] New Altered Skin Integrity was Present on Admit and Documented in LDA   [] Wound Image Taken    Wound Care Consulted? Yes    Attending Nurse:  KYAW Thornton     Second RN/Staff Member:  KYAW Goodwin

## 2024-06-21 NOTE — SUBJECTIVE & OBJECTIVE
Interval History: No acute events overnight. Did not sleep well reportedly did not fall asleep until 3 AM and this AM is more withdrawn but is AxO x3. Tolerated 3.5 hours of HD yesterday w/ 2L removed. Net -660cc/24h. Hemodynamically stable CVP 3, SvO2 57%, CO 5.64, CI 2.8, SVR 1035. Will try to get stepped down from the ICU today. Continuing on tube feeds for now.         Continuous Infusions:   0.9% NaCl   Intravenous Continuous   Stopped at 06/13/24 0117    D10W   Intravenous Continuous PRN         Scheduled Meds:   0.9% NaCl   Intravenous Once    amiodarone  400 mg Oral Daily    amoxicillin  500 mg Per NG tube Q12H    atorvastatin  40 mg Oral QHS    epoetin zohaib (PROCRIT) injection  4,000 Units Intravenous Every Mon, Wed, Fri    folic acid  1 mg Oral Daily    gabapentin  100 mg Oral BID    insulin aspart U-100  4 Units Subcutaneous 6 times per day    LIDOcaine  2 patch Transdermal Q24H    midodrine  10 mg Oral Q8H    pantoprazole  40 mg Oral Daily    QUEtiapine  50 mg Oral QHS    sodium bicarbonate  650 mg Oral TID    sodium phosphate 30 mmol in D5W 250 mL IVPB  30 mmol Intravenous Once    venlafaxine  37.5 mg Oral Daily    vitamin D  1,000 Units Oral Daily    warfarin  2.5 mg Oral Daily     PRN Meds:  Current Facility-Administered Medications:     0.9%  NaCl infusion (for blood administration), , Intravenous, Q24H PRN    0.9% NaCl, , Intravenous, PRN    acetaminophen, 650 mg, Oral, Q6H PRN    acetaminophen, 650 mg, Per NG tube, Q6H PRN    D10W, , Intravenous, Continuous PRN    dextrose 10%, 12.5 g, Intravenous, PRN    dextrose 10%, 25 g, Intravenous, PRN    glucagon (human recombinant), 1 mg, Intramuscular, PRN    insulin aspart U-100, 0-5 Units, Subcutaneous, Q4H PRN    simethicone, 1 tablet, Oral, TID PRN    traZODone, 25 mg, Oral, Nightly PRN    Review of patient's allergies indicates:  No Known Allergies  Objective:     Vital Signs (Most Recent):  Temp: 98.5 °F (36.9 °C) (06/21/24 0700)  Pulse: 91 (06/21/24  1000)  Resp: 19 (06/21/24 1000)  BP: (!) 87/59 (06/21/24 1000)  SpO2: 99 % (06/21/24 0700) Vital Signs (24h Range):  Temp:  [97.5 °F (36.4 °C)-98.5 °F (36.9 °C)] 98.5 °F (36.9 °C)  Pulse:  [] 91  Resp:  [16-48] 19  SpO2:  [94 %-99 %] 99 %  BP: ()/(0-83) 87/59     Patient Vitals for the past 72 hrs (Last 3 readings):   Weight   06/20/24 0601 76.3 kg (168 lb 3.4 oz)   06/19/24 0400 77.5 kg (170 lb 13.7 oz)     Body mass index is 22.19 kg/m².      Intake/Output Summary (Last 24 hours) at 6/21/2024 1053  Last data filed at 6/21/2024 1000  Gross per 24 hour   Intake 2064.36 ml   Output 2350 ml   Net -285.64 ml         Telemetry: SR       Physical Exam  Constitutional:       Appearance: Normal appearance.   HENT:      Head: Normocephalic and atraumatic.   Eyes:      Conjunctiva/sclera: Conjunctivae normal.      Pupils: Pupils are equal, round, and reactive to light.   Neck:      Comments: No JVD   Cardiovascular:      Rate and Rhythm: Normal rate and regular rhythm.      Comments: Smooth VAD hum  Pulmonary:      Effort: Pulmonary effort is normal.      Breath sounds: Normal breath sounds.   Abdominal:      General: Bowel sounds are normal.      Palpations: Abdomen is soft.   Musculoskeletal:         General: No swelling. Normal range of motion.      Cervical back: Normal range of motion and neck supple.   Skin:     General: Skin is warm and dry.      Capillary Refill: Capillary refill takes 2 to 3 seconds.   Neurological:      General: No focal deficit present.      Mental Status: He is alert. Mental status is at baseline.      Comments: Oriented x3            Significant Labs:  CBC:  Recent Labs   Lab 06/19/24  0312 06/20/24  0308 06/21/24  0451   WBC 8.66 7.05 10.12   RBC 2.42* 2.90* 3.03*   HGB 6.8* 8.1* 8.4*   HCT 20.9* 24.7* 26.5*    282 334   MCV 86 85 88   MCH 28.1 27.9 27.7   MCHC 32.5 32.8 31.7*     BNP:  Recent Labs   Lab 06/17/24 0319 06/19/24  0312 06/21/24  0451   BNP 1,023* 823* 1,328*      CMP:  Recent Labs   Lab 06/18/24  1816 06/19/24 0312 06/20/24  0308 06/21/24  0451   * 126* 171* 198*   CALCIUM 8.8 8.3* 8.7 8.7   ALBUMIN 2.4* 2.1*  --  2.1*   PROT 7.8 7.2  --  7.7   * 131* 133* 134*   K 4.1 3.7 3.7 4.0   CO2 18* 18* 17* 16*    102 103 106   BUN 17 32* 64* 54*   CREATININE 1.8* 2.7* 4.5* 3.7*   ALKPHOS 381* 369*  --  387*   * 159*  --  104*   * 110*  --  67*   BILITOT 0.8 0.5  --  0.4      Coagulation:   Recent Labs   Lab 06/19/24 0312 06/20/24  0308 06/21/24  0451   INR 2.2* 2.4* 2.5*   APTT 40.0* 42.4* 44.6*     LDH:  Recent Labs   Lab 06/19/24 0312 06/20/24  0308 06/21/24  0451   * 372* 375*     Microbiology:  Microbiology Results (last 7 days)       Procedure Component Value Units Date/Time    Culture, Respiratory with Gram Stain [[id number]]     Order Status: No result Specimen: Respiratory     Blood culture #1 **CANNOT BE ORDERED STAT** [[id number]] Collected: 06/11/24 2122    Order Status: Completed Specimen: Blood from Peripheral, Antecubital, Right Updated: 06/16/24 2212     Blood Culture, Routine No growth after 5 days.    Blood culture #2 **CANNOT BE ORDERED STAT** [[id number]] Collected: 06/11/24 2121    Order Status: Completed Specimen: Blood from Peripheral, Hand, Left Updated: 06/16/24 2212     Blood Culture, Routine No growth after 5 days.            I have reviewed all pertinent labs within the past 24 hours.    Estimated Creatinine Clearance: 22.3 mL/min (A) (based on SCr of 3.7 mg/dL (H)).    Diagnostic Results:  I have reviewed and interpreted all pertinent imaging results/findings within the past 24 hours.

## 2024-06-21 NOTE — PT/OT/SLP PROGRESS
"Occupational Therapy   Co-Treatment    Name: Radha Abbott  MRN: [id number]  Admitting Diagnosis:  Acute renal failure superimposed on stage 4 chronic kidney disease       Recommendations:     Discharge Recommendations: High Intensity Therapy  Discharge Equipment Recommendations:  hospital bed, wheelchair  Barriers to discharge:  None    Assessment:     Radha Abbott is a 64 y.o. male with a medical diagnosis of Acute renal failure superimposed on stage 4 chronic kidney disease.  He presents with the following performance deficits affecting function: weakness, impaired endurance, impaired self care skills, impaired functional mobility, gait instability, decreased lower extremity function, impaired balance, decreased ROM, decreased safety awareness. Pt willing to participate and tolerated fairly well overall. Pt was able to perform functional mobility with significant assistance, ADLs assessed with little assistance. He tolerated sitting eob well with verbal cues to maintain upright posture, became fatigued after first stand.     Rehab Prognosis:  Good; patient would benefit from acute skilled OT services to address these deficits and reach maximum level of function.       Plan:     Patient to be seen 4 x/week to address the above listed problems via self-care/home management, therapeutic activities, therapeutic exercises, neuromuscular re-education  Plan of Care Expires: 07/14/24  Plan of Care Reviewed with: patient    Subjective     Chief Complaint: tiredness  Patient/Family Comments/goals: "He didn't fall asleep until 2:30 last night." -per wife at bedside  Pain/Comfort:  Pain Rating 1: 0/10  Pain Rating Post-Intervention 1: 0/10    Objective:     Communicated with: RN prior to session.  Patient found supine with telemetry, LVAD, central line, blood pressure cuff, NG tube upon OT entry to room.    General Precautions: Standard, fall, LVAD    Orthopedic Precautions:N/A  Braces: N/A  Respiratory Status: Room air   "   Occupational Performance:     Bed Mobility:    Patient completed Rolling/Turning to Right with maximal assistance  Patient completed Scooting/Bridging with maximal assistance  Patient completed Supine to Sit with maximal assistance  Patient completed Sit to Supine with moderate assistance     Functional Mobility/Transfers:  Patient completed Sit <> Stand Transfer with moderate assistance  with  hand-held assist on 1st trial; 2nd trial Max A x2 and unable to reach full stand  Mod vc's to maintain upright posture  Functional Mobility: not attempted 2/2 to pt safety, fatigue, weakness    Balance:  Static sitting balance eob: CGA-SBA ~ 12 min; mod vc's to maintain upright cervical posture  Static standing balance: Max A; ~30 sec on 1st trial    Activities of Daily Living:  Grooming: contact guard assistance and setup assistance pt washed face sitting eob; pt able to locate, reach, grasp and perform task appropriately  Toileting: pt not needing to void at this time      Therapeutic Ex:  Elbow flex/ext  Shoulder flex  Shoulder shrugs  1x5 bilateral UE    AMPAC 6 Click ADL: 12    Treatment & Education:  Pt edu on role of OT, POC, safety when performing self care tasks , benefit of performing OOB activity, and safety when performing functional transfers and mobility.  - White board updated  - Self care tasks completed-- as noted above      Patient left supine with all lines intact, call button in reach, bed alarm on, RN notified, and family present    Co-treat with PT to have 2 skilled therapists present to safely assess pt's functional mobility.     GOALS:   Multidisciplinary Problems       Occupational Therapy Goals          Problem: Occupational Therapy    Goal Priority Disciplines Outcome Interventions   Occupational Therapy Goal     OT, PT/OT Progressing    Description: Goals to be met by: 7/14/24     Patient will increase functional independence with ADLs by performing:    UE Dressing with Set-up Assistance.  CHRISTOS  Dressing with Minimal Assistance.  Grooming while standing at sink with Stand-by Assistance.  Toileting from toilet/bedside commode with Minimal Assistance for hygiene and clothing management.   Supine to sit with Stand-by Assistance.  Step transfer with Contact Guard Assistance  Toilet transfer to toilet/bedside commode with Contact Guard Assistance.                         Time Tracking:     OT Date of Treatment: 06/21/24  OT Start Time: 1040  OT Stop Time: 1105  OT Total Time (min): 25 min    Billable Minutes:Self Care/Home Management 12  Therapeutic Exercise 13    OT/PRIETO: OT          6/21/2024

## 2024-06-21 NOTE — SUBJECTIVE & OBJECTIVE
Interval History:   HD completed yesterday, tolerated well with 2L net fluid removal.  Sleeping on exam this morning, wife at bedside reports agitation overnight and did not sleep well.   AM labs with HAGMA, bicarb 16.  LA 0.9.  CVP 3.  Phos low;  IV phos replacement ordered    Review of patient's allergies indicates:  No Known Allergies  Current Facility-Administered Medications   Medication Frequency    0.9%  NaCl infusion (for blood administration) Q24H PRN    0.9%  NaCl infusion Continuous    0.9%  NaCl infusion PRN    0.9%  NaCl infusion Once    acetaminophen tablet 650 mg Q6H PRN    acetaminophen tablet 650 mg Q6H PRN    amiodarone tablet 400 mg Daily    amoxicillin 400 mg/5 mL suspension 500 mg Q12H    atorvastatin tablet 40 mg QHS    dextrose 10 % infusion Continuous PRN    dextrose 10% bolus 125 mL 125 mL PRN    dextrose 10% bolus 250 mL 250 mL PRN    epoetin zohaib injection 4,000 Units Every Mon, Wed, Fri    folic acid tablet 1 mg Daily    gabapentin capsule 100 mg BID    glucagon (human recombinant) injection 1 mg PRN    insulin aspart U-100 pen 0-5 Units Q4H PRN    insulin aspart U-100 pen 4 Units 6 times per day    LIDOcaine 5 % patch 2 patch Q24H    midodrine tablet 10 mg Q8H    pantoprazole EC tablet 40 mg Daily    QUEtiapine tablet 50 mg QHS    simethicone chewable tablet 80 mg TID PRN    sodium bicarbonate tablet 650 mg TID    sodium phosphate 30 mmol in D5W 250 mL IVPB Once    trazodone split tablet 25 mg Nightly PRN    venlafaxine tablet 37.5 mg Daily    vitamin D 1000 units tablet 1,000 Units Daily    warfarin (COUMADIN) tablet 2.5 mg Daily       Objective:     Vital Signs (Most Recent):  Temp: 98.5 °F (36.9 °C) (06/21/24 0700)  Pulse: 91 (06/21/24 1000)  Resp: 19 (06/21/24 1000)  BP: (!) 87/59 (06/21/24 1000)  SpO2: 99 % (06/21/24 0700) Vital Signs (24h Range):  Temp:  [97.5 °F (36.4 °C)-98.5 °F (36.9 °C)] 98.5 °F (36.9 °C)  Pulse:  [] 91  Resp:  [16-48] 19  SpO2:  [94 %-99 %] 99 %  BP:  ()/(0-83) 87/59     Weight: 76.3 kg (168 lb 3.4 oz) (06/20/24 0601)  Body mass index is 22.19 kg/m².  Body surface area is 1.98 meters squared.    I/O last 3 completed shifts:  In: 2643.2 [P.O.:80; I.V.:154.2; NG/GT:2310; IV Piggyback:99]  Out: 2350 [Other:2350]     Physical Exam  Vitals and nursing note reviewed.   Constitutional:       General: He is sleeping.      Appearance: Normal appearance.   HENT:      Head: Normocephalic and atraumatic.   Eyes:      Conjunctiva/sclera: Conjunctivae normal.      Pupils: Pupils are equal, round, and reactive to light.   Neck:      Comments: RIJ trialysis cath.   Cardiovascular:      Rate and Rhythm: Normal rate and regular rhythm.      Comments: Smooth VAD hum  Pulmonary:      Effort: Pulmonary effort is normal. No respiratory distress.      Breath sounds: Normal breath sounds. No wheezing or rales.   Abdominal:      General: Bowel sounds are normal.      Palpations: Abdomen is soft.   Musculoskeletal:         General: No swelling. Normal range of motion.      Cervical back: Normal range of motion and neck supple.      Right lower leg: No edema.      Left lower leg: No edema.   Skin:     General: Skin is warm and dry.   Neurological:      General: No focal deficit present.          Significant Labs:  CBC:   Recent Labs   Lab 06/21/24  0451   WBC 10.12   RBC 3.03*   HGB 8.4*   HCT 26.5*      MCV 88   MCH 27.7   MCHC 31.7*     CMP:   Recent Labs   Lab 06/21/24  0451   *   CALCIUM 8.7   ALBUMIN 2.1*   PROT 7.7   *   K 4.0   CO2 16*      BUN 54*   CREATININE 3.7*   ALKPHOS 387*   *   AST 67*   BILITOT 0.4

## 2024-06-21 NOTE — PROGRESS NOTES
[facility] - Cardiac Intensive Care  Heart Transplant  Progress Note    Patient Name: Radha Abbott  MRN: [id number]  Admission Date: 6/11/2024  Hospital Length of Stay: 10 days  Attending Physician: Sajan Hurley, *  Primary Care Provider: Vasu Kong MD  Principal Problem:Acute renal failure superimposed on stage 4 chronic kidney disease    Subjective:   Interval History: No acute events overnight. Did not sleep well reportedly did not fall asleep until 3 AM and this AM is more withdrawn but is AxO x3. Tolerated 3.5 hours of HD yesterday w/ 2L removed. Net -660cc/24h. Hemodynamically stable CVP 3, SvO2 57%, CO 5.64, CI 2.8, SVR 1035. Will try to get stepped down from the ICU today. Continuing on tube feeds for now.         Continuous Infusions:   0.9% NaCl   Intravenous Continuous   Stopped at 06/13/24 0117    D10W   Intravenous Continuous PRN         Scheduled Meds:   0.9% NaCl   Intravenous Once    amiodarone  400 mg Oral Daily    amoxicillin  500 mg Per NG tube Q12H    atorvastatin  40 mg Oral QHS    epoetin zohaib (PROCRIT) injection  4,000 Units Intravenous Every Mon, Wed, Fri    folic acid  1 mg Oral Daily    gabapentin  100 mg Oral BID    insulin aspart U-100  4 Units Subcutaneous 6 times per day    LIDOcaine  2 patch Transdermal Q24H    midodrine  10 mg Oral Q8H    pantoprazole  40 mg Oral Daily    QUEtiapine  50 mg Oral QHS    sodium bicarbonate  650 mg Oral TID    sodium phosphate 30 mmol in D5W 250 mL IVPB  30 mmol Intravenous Once    venlafaxine  37.5 mg Oral Daily    vitamin D  1,000 Units Oral Daily    warfarin  2.5 mg Oral Daily     PRN Meds:  Current Facility-Administered Medications:     0.9%  NaCl infusion (for blood administration), , Intravenous, Q24H PRN    0.9% NaCl, , Intravenous, PRN    acetaminophen, 650 mg, Oral, Q6H PRN    acetaminophen, 650 mg, Per NG tube, Q6H PRN    D10W, , Intravenous, Continuous PRN    dextrose 10%, 12.5 g, Intravenous, PRN    dextrose 10%, 25 g,  Intravenous, PRN    glucagon (human recombinant), 1 mg, Intramuscular, PRN    insulin aspart U-100, 0-5 Units, Subcutaneous, Q4H PRN    simethicone, 1 tablet, Oral, TID PRN    traZODone, 25 mg, Oral, Nightly PRN    Review of patient's allergies indicates:  No Known Allergies  Objective:     Vital Signs (Most Recent):  Temp: 98.5 °F (36.9 °C) (06/21/24 0700)  Pulse: 91 (06/21/24 1000)  Resp: 19 (06/21/24 1000)  BP: (!) 87/59 (06/21/24 1000)  SpO2: 99 % (06/21/24 0700) Vital Signs (24h Range):  Temp:  [97.5 °F (36.4 °C)-98.5 °F (36.9 °C)] 98.5 °F (36.9 °C)  Pulse:  [] 91  Resp:  [16-48] 19  SpO2:  [94 %-99 %] 99 %  BP: ()/(0-83) 87/59     Patient Vitals for the past 72 hrs (Last 3 readings):   Weight   06/20/24 0601 76.3 kg (168 lb 3.4 oz)   06/19/24 0400 77.5 kg (170 lb 13.7 oz)     Body mass index is 22.19 kg/m².      Intake/Output Summary (Last 24 hours) at 6/21/2024 1053  Last data filed at 6/21/2024 1000  Gross per 24 hour   Intake 2064.36 ml   Output 2350 ml   Net -285.64 ml         Telemetry: SR       Physical Exam  Constitutional:       Appearance: Normal appearance.   HENT:      Head: Normocephalic and atraumatic.   Eyes:      Conjunctiva/sclera: Conjunctivae normal.      Pupils: Pupils are equal, round, and reactive to light.   Neck:      Comments: No JVD   Cardiovascular:      Rate and Rhythm: Normal rate and regular rhythm.      Comments: Smooth VAD hum  Pulmonary:      Effort: Pulmonary effort is normal.      Breath sounds: Normal breath sounds.   Abdominal:      General: Bowel sounds are normal.      Palpations: Abdomen is soft.   Musculoskeletal:         General: No swelling. Normal range of motion.      Cervical back: Normal range of motion and neck supple.   Skin:     General: Skin is warm and dry.      Capillary Refill: Capillary refill takes 2 to 3 seconds.   Neurological:      General: No focal deficit present.      Mental Status: He is alert. Mental status is at baseline.       Comments: Oriented x3            Significant Labs:  CBC:  Recent Labs   Lab 06/19/24 0312 06/20/24  0308 06/21/24  0451   WBC 8.66 7.05 10.12   RBC 2.42* 2.90* 3.03*   HGB 6.8* 8.1* 8.4*   HCT 20.9* 24.7* 26.5*    282 334   MCV 86 85 88   MCH 28.1 27.9 27.7   MCHC 32.5 32.8 31.7*     BNP:  Recent Labs   Lab 06/17/24 0319 06/19/24 0312 06/21/24  0451   BNP 1,023* 823* 1,328*     CMP:  Recent Labs   Lab 06/18/24  1816 06/19/24 0312 06/20/24 0308 06/21/24  0451   * 126* 171* 198*   CALCIUM 8.8 8.3* 8.7 8.7   ALBUMIN 2.4* 2.1*  --  2.1*   PROT 7.8 7.2  --  7.7   * 131* 133* 134*   K 4.1 3.7 3.7 4.0   CO2 18* 18* 17* 16*    102 103 106   BUN 17 32* 64* 54*   CREATININE 1.8* 2.7* 4.5* 3.7*   ALKPHOS 381* 369*  --  387*   * 159*  --  104*   * 110*  --  67*   BILITOT 0.8 0.5  --  0.4      Coagulation:   Recent Labs   Lab 06/19/24 0312 06/20/24 0308 06/21/24  0451   INR 2.2* 2.4* 2.5*   APTT 40.0* 42.4* 44.6*     LDH:  Recent Labs   Lab 06/19/24 0312 06/20/24  0308 06/21/24  0451   * 372* 375*     Microbiology:  Microbiology Results (last 7 days)       Procedure Component Value Units Date/Time    Culture, Respiratory with Gram Stain [[id number]]     Order Status: No result Specimen: Respiratory     Blood culture #1 **CANNOT BE ORDERED STAT** [[id number]] Collected: 06/11/24 2122    Order Status: Completed Specimen: Blood from Peripheral, Antecubital, Right Updated: 06/16/24 2212     Blood Culture, Routine No growth after 5 days.    Blood culture #2 **CANNOT BE ORDERED STAT** [[id number]] Collected: 06/11/24 2121    Order Status: Completed Specimen: Blood from Peripheral, Hand, Left Updated: 06/16/24 2212     Blood Culture, Routine No growth after 5 days.            I have reviewed all pertinent labs within the past 24 hours.    Estimated Creatinine Clearance: 22.3 mL/min (A) (based on SCr of 3.7 mg/dL (H)).    Diagnostic Results:  I have reviewed and interpreted all  pertinent imaging results/findings within the past 24 hours.  Assessment and Plan:     Mr. Radha Abbott is a 65 yo male with stage D HFrEF, ICMP who underwent HM3 placement as DT (12/1/23) w/ a hospital stay complicated by vasoplegia(requiring Giaprezza), debility, malnutrition/impaired swallowing, and renal failure requiring HD (since weaned off) w/ recent discharge for ADHF presented to the ED with wife due to c/o worsening generalized weakness since 6/10. Per wife patient was mostly sleeping during the day despite sleeping 10 hrs at night. Patient also admits decreased appetite and decreased urine output despite taking his medications as prescribed. He has not urinated since 6/11 at 2pm. He has chronic shortness of breath on exertion. Denies confusion, syncope, diarrhea, constipation, N/V, dysuria, or other complaints. Patient had his prior tunnel cath removed used for HD about 2 weeks ago. He completed a 6 week therapy for Enterococcus bacteremia with ampicillin and rocephin ending 5/30/24a and was switched to PO antibiotics.     On prior hospital stay nephrology consulted during hospital stay due to elevated renal function. They feel he is close to being a dialysis patient. He diuresed on IV Bumex with prn Metolazone. He is net negative 1.2L throughout his hospital stay and weight on day of discharge was 179lbs down from 186lbs on admit. We continued his home dose of Bumex 4mg BID but increased his prn Metolazone to 10mg. He received a dose of Epo while inpatient and he has plans to follow up with Heme/Onc to get outpatient therapy arranged.      2D Echo with CFD done on 3/26/2024  LVAD: There is a Heartmate III LVAD running at 5000 RPM. The aortic valve does not open. The ventricular septum is at midline. Inflow cannula well seated at the apex. Outflow graft not visualized.   Left Ventricle: The left ventricle is moderately dilated. Normal wall thickness. Global hypokinesis present. Septal motion is abnormal.  There is severely reduced systolic function with a visually estimated ejection fraction of 5 - 10%. There is diastolic dysfunction but grade cannot be determined.   Right Ventricle: Mild right ventricular enlargement. Wall thickness is normal. Right ventricle wall motion is normal. Systolic function is normal. Pacemaker lead present in the ventricle.   Left Atrium: Left atrium is severely dilated.   Right Atrium: Right atrium is moderately dilated.   Mitral Valve: The mitral valve is repaired with an Noble stitch. There is mild regurgitation.   IVC/SVC: Normal venous pressure at 3 mmHg.              * Acute renal failure superimposed on stage 4 chronic kidney disease  Mr. Radha Abbott is a 65 yo male with stage D HFrEF, ICMP who underwent HM3 placement as DT, DM2, CKD4, HTN, E.faecalis bacteremia who presented due to c/o worsening generalized weakness since 6/10. Admitted for worsening renal function with a BUN>100/Cr. 9.9.     - CT Ab/pel: Kidneys/ Ureters: Normal in size and location.  Mild bilateral perinephric stranding, a nonspecific finding and similar to prior.  No hydronephrosis or nephrolithiasis. No ureteral dilatation.  - Nephrology consulted, appreciate recs.   - HD per nephro   - Will need OP HD upon discharge, recommend CM/SW involvement to start workup.  - Will need IR evaluation once out of ICU for TDC placement.  - Renally dosing medications      Aspiration pneumonia of both lungs  -CT chest and CXR concerning for pneumonia   -tolerating room air   - de-escalate to Amoxicillin     Delirium  -Resolved   -CT head-toe neg.   -Continue Seroquel.       Bacteremia due to Enterococcus  He completed a 6 week therapy for Enterococcus bacteremia with ampicillin and rocephin ending 5/30/24a and was switched to Amoxicillin for suppression    Anticoagulant long-term use  Cont warfarin    Anemia  Chronic with no acute bleeding, multifactorial 2/2 CKD4 and chronic anticoagulation  -Iron panel done. Iron deficient  but Ferritin is elevated at 1186  -Homocysteine normal and methylmalonic pending   -s/p Epo   -Replaced IV iron   -Continue to trend CBC's    Adjustment disorder with depressed mood  Cont SSRI    LVAD (left ventricular assist device) present  -HeartMate 3 Implanted  12/1/2023  as DT  -Cont Coumadin, dosing by PharmD.  Goal INR 2.0-3.0. therapeutic today.   -Antiplatelets Not on ASA  -LDH is stable overall today. Will continue to monitor daily.  -Speed set at  5000     -Interrogation notable for no events  -Not listed for OHTx, declined for OHTX due to comorbidities     Procedure: Device Interrogation Including analysis of device parameters  Current Settings: Ventricular Assist Device  Review of device function is stable        6/21/2024    10:00 AM 6/21/2024     9:00 AM 6/21/2024     8:00 AM 6/21/2024     7:01 AM 6/21/2024     7:00 AM 6/21/2024     6:00 AM 6/21/2024     5:01 AM   TXP LVAD INTERROGATIONS   Type HeartMate3 HeartMate3 HeartMate3 HeartMate3 HeartMate3 HeartMate3 HeartMate3   Flow 4.5 4.2 4.4 4.4 4.3 4.1 4.5   Speed 5000 5000 5000 5000 5000 5000 5000   PI 3.5 3.3 3.3 3.3 3.6 4.8 3.8   Power (Carrington) 3.4 3.4 3.4 3.4 3.3 3.6 3.4   LSL 4600 4600 4600 4600 4600 4600 4600   Pulsatility Intermittent pulse Intermittent pulse Intermittent pulse Intermittent pulse Intermittent pulse Intermittent pulse Intermittent pulse         PAF (paroxysmal atrial fibrillation)  -Continue amiodarone and Coumadin      Type 2 diabetes mellitus without complication, without long-term current use of insulin  -Endocrine    Acute on chronic combined systolic and diastolic heart failure  Owing to his advanced kidney disease and significantly elevated creat, he is not on a  ACEI/ARB/ARNI or MRA. He is also not on a beta blocker due to RV dysfunction.     Plan:   -Volume removal with HD   - See LVAD  - See IVÁN    CAD (coronary artery disease)  -Continue statin        Jimy An PA-C  Heart Transplant  [facility] - Cardiac Intensive  Care

## 2024-06-21 NOTE — PROGRESS NOTES
06/21/2024  [place]    Current provider:  Sajan Hurley, *    Device interrogation:      6/21/2024     7:00 AM 6/21/2024     6:00 AM 6/21/2024     5:01 AM 6/21/2024     4:01 AM 6/21/2024     3:01 AM 6/21/2024     2:01 AM 6/21/2024     1:01 AM   TXP LVAD INTERROGATIONS   Type HeartMate3 HeartMate3 HeartMate3 HeartMate3 HeartMate3 HeartMate3 HeartMate3   Flow 4.3 4.1 4.5 4.6 4.5 4.4 4.5   Speed 5000 5000 5000 5000 5000 5000 5050   PI 3.6 4.8 3.8 3.8 3.8 3.9 3.2   Power (Carrington) 3.3 3.6 3.4 3.4 3.5 3.4 3.4   LSL 4600 4600 4600 4600 4600 4600 4600   Pulsatility Intermittent pulse Intermittent pulse Intermittent pulse Intermittent pulse Intermittent pulse Intermittent pulse Intermittent pulse          Rounded on [facility] to ensure all mechanical assist device settings (IABP or VAD) were appropriate and all parameters were within limits.  I was able to ensure all back up equipment was present, the staff had no issues, and the Perfusion Department daily rounding was complete.      For implantable VADs: Interrogation of Ventricular assist device was performed with analysis of device parameters and review of device function. I have personally reviewed the interrogation findings and agree with findings as stated.     In emergency, the nursing units have been notified to contact the perfusion department either by:  Calling [phone] from 630am to 4pm Mon thru Fri, utilizing the On-Call Finder functionality of Epic and searching for Perfusion, or by contacting the hospital  from 4pm to 630am and on weekends and asking to speak with the perfusionist on call.    8:11 AM

## 2024-06-21 NOTE — PT/OT/SLP PROGRESS
Physical Therapy   Co-Treatment with OT    Patient Name:  Radha Abbott   MRN:  [id number]    Recommendations:     Discharge Recommendations: High Intensity Therapy  Discharge Equipment Recommendations:  (will determine DME needs closer to discharge)  Barriers to discharge: Decreased caregiver support at  current level of assistance    Assessment:     Radha Abbott is a 64 y.o. male admitted with a medical diagnosis of Acute renal failure superimposed on stage 4 chronic kidney disease.  He presents with the following impairments/functional limitations: weakness, impaired endurance, impaired functional mobility, gait instability, decreased lower extremity function, decreased coordination, decreased ROM, pain, decreased safety awareness.  Pt was agreeable and tolerated session fairly well. Pt required slightly more assistance with bed mobility and sit<>stands, but pt's family reported pt not sleeping well. Pt tolerated session EOB with CGA-SBA and completed varies task with therapist. Patient has demonstrated sufficient progression to warrant high intensity therapy evidenced by objectives noted below. Pt is s/p LVAD placement 12/2023.     Rehab Prognosis: Good; patient would benefit from acute skilled PT services to address these deficits and reach maximum level of function.    Recent Surgery: * No surgery found *      Plan:     During this hospitalization, patient to be seen 4 x/week to address the identified rehab impairments via gait training, therapeutic activities, therapeutic exercises, neuromuscular re-education and progress toward the following goals:    Plan of Care Expires:  07/14/24    Subjective     Chief Complaint: fatigued  Patient/Family Comments/goals: pt's family reported pt as restless after dialysis and didn't sleep until 2:30am.   Pain/Comfort:  Pain Rating 1: 0/10  Pain Rating Post-Intervention 1: 0/10      Objective:     Communicated with nurse prior to session.  Patient found HOB elevated with  telemetry, blood pressure cuff, central line, LVAD, NG tube upon PT entry to room.     General Precautions: Standard, LVAD, fall  Orthopedic Precautions: N/A  Braces: N/A  Respiratory Status: Room air     Functional Mobility:  Additional staff present: OT  Co-treatment performed due to patient's multiple deficits requiring two skilled therapists to appropriately and safely assess patient's strength and endurance while facilitating functional tasks in addition to accommodating for patient's activity tolerance  Bed Mobility:   Scooting   to EOB: contact guard assistance  Along EOB: contact guard assistance  Supine to Sit: maximal assistance; HOB elevated  Assisted with trunk management   Sit to Supine: moderate assistance  Assisted with trunk and LE management  Transfers:   Sit <> Stand Transfer:   1st trial: moderate assistance with hand-held assist   2nd trial: total assistance and of 2 person with hand-held assistance. Minimal clearance from the bed, unable to achieve fully upright.   BLE blocked.  Gait:  Deferred due to poor standing tolerance.   Balance:   Static/Dynamic sitting EOB balance: SBA-CGA   Rounded posture with downward gaze, cued to look out the window and more upright posture.   Completed activities with OT and LVAD awareness (located drive site side and emergency bag)  Standing balance: modAx1-2 person and HHA   Tolerated ~30 seconds with rounded shoulders and downward gaze. Max cues for more upright posture.     AM-PAC 6 CLICK MOBILITY  Turning over in bed (including adjusting bedclothes, sheets and blankets)?: 3  Sitting down on and standing up from a chair with arms (e.g., wheelchair, bedside commode, etc.): 2  Moving from lying on back to sitting on the side of the bed?: 2  Moving to and from a bed to a chair (including a wheelchair)?: 2  Need to walk in hospital room?: 1  Climbing 3-5 steps with a railing?: 1  Basic Mobility Total Score: 11       Treatment & Education:  Patient educated on role  of therapy, goals of session, and benefits of out of bed mobility.   Instructed on use of call button and importance of calling nursing staff for assistance with mobility   Questions/concerns addressed within PTA scope of practice  Pt verbalized understanding.    Patient left HOB elevated with all lines intact, call button in reach, and nurse notified..    GOALS:   Multidisciplinary Problems       Physical Therapy Goals          Problem: Physical Therapy    Goal Priority Disciplines Outcome Goal Variances Interventions   Physical Therapy Goal     PT, PT/OT Progressing     Description: Goals to be completed by: 7/14/24    1.Pt will perform sup<>sit transfers w/ contact guard assistance  2.Pt will have sufficient dynamic balance to sit EOB while performing ADLs/therex w/ supervision  3.Pt will be able to stand up from EOB w/ contact guard assistance using LRAD  4.Pt will ambulate 100 feet w/ minimum assistance using LRAD  5.Pt will be independent w/ HEP therex on BLE w/ good form and ROM                        Time Tracking:     PT Received On: 06/21/24  PT Start Time: 1041     PT Stop Time: 1106  PT Total Time (min): 25 min     Billable Minutes: Therapeutic Activity 13 and Neuromuscular Re-education 12    Treatment Type: Treatment  PT/PTA: PTA     Number of PTA visits since last PT visit: 1 06/21/2024

## 2024-06-21 NOTE — SUBJECTIVE & OBJECTIVE
"Interval HPI:   Overnight events: NAEON. Remains in CICU. BG above goal ranges on current prn SQ insulin correction scale. Tube feeds now at goal rate of 60 ml/hr. Creatinine 3.7. Diet Minced & Moist (IDDSI Level 5) Fluid - 1500mL    Eatin%  Nausea: No  Hypoglycemia and intervention: No  Fever: No  TPN and/or TF: Yes  If yes, type of TF/TPN and rate: TFs at 60 ml/hr     BP (!) 76/0 (BP Location: Right arm, Patient Position: Lying)   Pulse 87   Temp 98.2 °F (36.8 °C) (Oral)   Resp 20   Ht 6' 1" (1.854 m)   Wt 76.3 kg (168 lb 3.4 oz)   SpO2 97%   BMI 22.19 kg/m²     Labs Reviewed and Include    Recent Labs   Lab 24  0451   *   CALCIUM 8.7   ALBUMIN 2.1*   PROT 7.7   *   K 4.0   CO2 16*      BUN 54*   CREATININE 3.7*   ALKPHOS 387*   *   AST 67*   BILITOT 0.4     Lab Results   Component Value Date    WBC 10.12 2024    HGB 8.4 (L) 2024    HCT 26.5 (L) 2024    MCV 88 2024     2024     No results for input(s): "TSH", "FREET4" in the last 168 hours.  Lab Results   Component Value Date    HGBA1C 7.4 (H) 2024       Nutritional status:   Body mass index is 22.19 kg/m².  Lab Results   Component Value Date    ALBUMIN 2.1 (L) 2024    ALBUMIN 2.1 (L) 2024    ALBUMIN 2.4 (L) 2024     Lab Results   Component Value Date    PREALBUMIN 16 (L) 2024    PREALBUMIN 14 (L) 2024    PREALBUMIN 15 (L) 2024       Estimated Creatinine Clearance: 22.3 mL/min (A) (based on SCr of 3.7 mg/dL (H)).    Accu-Checks  Recent Labs     24  1358 24  1810 24  0305 24  0801 24  0908 24  1245 24  1748 24  2007 24  0920   POCTGLUCOSE 229* 186* 185* 183* 255* 274* 348* 173* 208* 416*       Current Medications and/or Treatments Impacting Glycemic Control  Immunotherapy:    Immunosuppressants       None          Steroids:   Hormones (From admission, onward)      None      " Subjective     Chief Complaint   Patient presents with   • Multiple Sclerosis   • Narcolepsy       Anum Simms is a 63 y.o. female right handed works at [employer]. She is here today for follow up for MS and Narcolepsy. She was last seen 11/2017.  She denies flare ups or exacerbations. She denies injection site with Betaseron problems but does have some mild bruising at times.   she has continued with Provigil and is tolerating. She tells me she has been out of provigil for about 1 month but I did send a refill 10/26/18. . She has no new complaints today. She did have MRI brain and cervical spine 2017.  She also has hx of chronic anemia, diarrhea, HTN.     Multiple Sclerosis   This is a chronic (2006) problem. The current episode started more than 1 year ago. The problem occurs daily. The problem has been unchanged. Associated symptoms include arthralgias (left elbow) and fatigue. Pertinent negatives include no chest pain, myalgias, nausea, numbness, sore throat, vomiting or weakness. Treatments tried: Betaseron injections. The treatment provided significant relief.     This is a chronic (NARCOLEPSY) problem. The current episode started more than 1 year ago (2006). Associated symptoms include arthralgias (left elbow) and fatigue. Pertinent negatives include no chest pain, myalgias, nausea, numbness, sore throat, vomiting or weakness. Treatments tried: PROVIGIL. The treatment provided significant relief.        Current Outpatient Prescriptions   Medication Sig Dispense Refill   • cyanocobalamin (VITAMIN B-12) 250 MCG tablet Take 250 mcg by mouth Daily.     • dexlansoprazole (DEXILANT) 60 MG capsule Take 60 mg by mouth Daily.     • diphenhydrAMINE (BENADRYL) 25 mg capsule Take 25 mg by mouth Every Other Day.     • ferrous sulfate 325 (65 FE) MG tablet Take 1 tablet by mouth Daily.  2   • fluticasone (FLONASE) 50 MCG/ACT nasal spray 2 sprays into each nostril Daily.     • interferon beta-1B (BETASERON) 0.3 MG kit      Pressors:    Autonomic Drugs (From admission, onward)      Start     Stop Route Frequency Ordered    06/19/24 1415  midodrine tablet 10 mg         -- Oral Every 8 hours 06/19/24 1414          Hyperglycemia/Diabetes Medications:   Antihyperglycemics (From admission, onward)      Start     Stop Route Frequency Ordered    06/21/24 1200  insulin aspart U-100 pen 3 Units         -- SubQ 6 times per day 06/21/24 0940    06/21/24 1041  insulin aspart U-100 pen 0-5 Units         -- SubQ Every 4 hours PRN 06/21/24 0941           sc kit Add 1.2ML diluent, mix gently. Inject 1ML (0.25MG) SQ every other day. 45 kit 3   • losartan (COZAAR) 100 MG tablet Take 100 mg by mouth Daily.     • magnesium oxide (MAGOX) 400 (241.3 MG) MG tablet tablet Take 400 mg by mouth Daily.     • metoprolol tartrate (LOPRESSOR) 50 MG tablet Take 50 mg by mouth 2 (Two) Times a Day.     • modafinil (PROVIGIL) 200 MG tablet Take 1 tablet by mouth 2 (Two) Times a Day. 7am and 12pm 180 tablet 0   • oxybutynin XL (DITROPAN XL) 15 MG 24 hr tablet Take 15 mg by mouth Daily.  5   • pravastatin (PRAVACHOL) 10 MG tablet Take 10 mg by mouth Daily.       No current facility-administered medications for this visit.        Past Medical History:   Diagnosis Date   • Broken wrist    • GERD (gastroesophageal reflux disease)    • HTN (hypertension)    • MS (multiple sclerosis) (CMS/HCC)    • Narcolepsy        Past Surgical History:   Procedure Laterality Date   • APPENDECTOMY     • BLADDER SURGERY     • COLONOSCOPY  07/10/2015    Normal exam repeat in 10 years   • COLONOSCOPY N/A 7/12/2018    Procedure: COLONOSCOPY WITH ANESTHESIA;  Surgeon: Nnamdi Grubbs MD;  Location: [facility] ENDOSCOPY;  Service: Gastroenterology   • ENDOSCOPY  07/20/2012    HH, Distal esophageal ring dilated to 48 Fr   • HERNIA REPAIR     • HYSTERECTOMY         family history includes Cancer in her mother.    Social History   Substance Use Topics   • Smoking status: Never Smoker   • Smokeless tobacco: Never Used   • Alcohol use No       Review of Systems   Constitutional: Positive for fatigue.   HENT: Negative.  Negative for rhinorrhea, sinus pressure and sore throat.    Eyes: Negative.  Negative for visual disturbance.   Respiratory: Negative.  Negative for choking and chest tightness.    Cardiovascular: Negative.  Negative for chest pain.   Gastrointestinal: Negative.  Negative for constipation, diarrhea, nausea and vomiting.   Endocrine: Negative.  Negative for cold intolerance and heat intolerance.  "  Genitourinary: Positive for dysuria and frequency.   Musculoskeletal: Positive for arthralgias (left elbow). Negative for myalgias.        Fractured right wrist in July 2016   Skin: Negative.    Allergic/Immunologic: Negative.    Neurological: Negative.  Negative for dizziness, tremors, speech difficulty, weakness and numbness.   Hematological: Negative.    Psychiatric/Behavioral: Negative.  Negative for agitation, confusion and hallucinations.   All other systems reviewed and are negative.      Objective     /80   Pulse 52   Ht 152.4 cm (60\")   Wt 57.2 kg (126 lb)   BMI 24.61 kg/m² , Body mass index is 24.61 kg/m².    Physical Exam   Constitutional: She is oriented to person, place, and time. Vital signs are normal. She appears well-developed and well-nourished. She is cooperative.   HENT:   Head: Normocephalic and atraumatic.   Right Ear: Hearing and external ear normal.   Left Ear: Hearing and external ear normal.   Nose: Nose normal.   Mouth/Throat: Oropharynx is clear and moist.   Hearing symmetric to finger rub   Eyes: Pupils are equal, round, and reactive to light. Conjunctivae, EOM and lids are normal. Right eye exhibits normal extraocular motion and no nystagmus. Left eye exhibits normal extraocular motion and no nystagmus. Right pupil is round and reactive. Left pupil is round and reactive. Pupils are equal.   Neck: Normal range of motion. Neck supple. Carotid bruit is not present.   Cardiovascular: Normal rate, regular rhythm and normal heart sounds.    No murmur heard.  Pulmonary/Chest: Effort normal and breath sounds normal. She has no decreased breath sounds. She has no rhonchi.   Abdominal: Soft. Bowel sounds are normal.   Musculoskeletal: Normal range of motion.   Neurological: She is alert and oriented to person, place, and time. She has normal strength. She displays no atrophy and no tremor. No cranial nerve deficit. She exhibits normal muscle tone. She displays a negative Romberg sign. " Coordination and gait normal.   Reflex Scores:       Tricep reflexes are 2+ on the right side and 2+ on the left side.       Bicep reflexes are 2+ on the right side and 2+ on the left side.       Brachioradialis reflexes are 2+ on the right side and 2+ on the left side.       Patellar reflexes are 3+ on the right side and 3+ on the left side.       Achilles reflexes are 3+ on the right side and 3+ on the left side.  Awake, alert. No aphasia, no dysarthria  Completes simple and complex commands    CN II:  Visual fields full.  Pupils equally reactive to light  CN III, IV, VI:  Extraocular Muscles full with no signs of nystagmus  CN V:  Facial sensory is symmetric with no asymetries.  CN VII:  Facial motor symmetric  CN VIII:  Gross hearing intact bilaterally  CN IX:  Palate elevates symmetrically  CN X:  Palate elevates symmetrically  CN XI:  Shoulder shrug symmetric  CN XII:  Tongue is midline on protrusion    Full and symmetric strength bilateral upper and lower extremities.   Skin: Skin is warm and dry.   Psychiatric: She has a normal mood and affect. Her speech is normal and behavior is normal. Cognition and memory are normal.   Nursing note and vitals reviewed.         MRI BRAIN WITH AND WITHOUT:IMPRESSION:  1. Stable nonenhancing hyperintense periventricular/pericallosal and  white matter signal changes. No abnormal enhancement or progression of  intra-axial FLAIR signal change since 12/1/2016.  2. Mild atrophy/cerebral volume loss.        MRI CS WITH AND WITHOUT: IMPRESSION:  1. No abnormal signal or enhancement of the cervical spinal cord.  Degenerative changes at C5/6 and C6/7 are similar to the 11/20/2016  exam.      ASSESSMENT/PLAN    Diagnoses and all orders for this visit:    MS (multiple sclerosis) (CMS/[facility])  -     MRI Brain With & Without Contrast; Future  -     MRI Cervical Spine With & Without Contrast; Future    Therapeutic drug monitoring  -     CBC & Differential; Future  -     Comprehensive  Metabolic Panel; Future    Primary narcolepsy without cataplexy    Other orders  -     oxybutynin XL (DITROPAN XL) 15 MG 24 hr tablet; Take 15 mg by mouth Daily.    Medical Decision Makin. Continue with Betaseron injection every other day  2. Continue with Provigil 200mg BID  3. shakeel reviewed.  4. Obtain MR brain and cervical spine with and without   5. Healthy BMI      6. Patient does not use tobacco   7. Check CBC, CMP    HPI and ROS reviewed and updated.     allergies and all known medications/prescriptions have been reviewed using resources available on this encounter.    Return in about 6 months (around 2019).        Shireen Ruvalcaba, MAYELA

## 2024-06-21 NOTE — PT/OT/SLP PROGRESS
Speech Language Pathology Treatment    Patient Name:  Radha Abbott   MRN:  [id number]  Admitting Diagnosis: Acute renal failure superimposed on stage 4 chronic kidney disease    Recommendations:                 General Recommendations:  Dysphagia therapy  Diet recommendations:  Soft & Bite Sized Diet - IDDSI Level 6, Liquid Diet Level: Thin liquids - IDDSI Level 0   Aspiration Precautions: Standard aspiration precautions   General Precautions: Standard, LVAD, fall  Communication strategies:  none    Assessment:     Radha Abbott is a 64 y.o. male with an SLP diagnosis of Dysphagia.     Subjective     Awake/alert    Pain/Comfort:  Pain Rating 1: 0/10  Pain Rating Post-Intervention 1: 0/10    Respiratory Status: Room air    Objective:     Has the patient been evaluated by SLP for swallowing?   Yes  Keep patient NPO? No     Pt repositioned upright in bed for PO trials. He tolerated smoothie x6, pills whole with thin liquids x6 with timely swallow initiation and no overt s/s of airway compromise. RN reported that pt does have prolonged mastication during PO intake, but pt would like to advance diet to receive more appetizing foods. Recommend upgrade to level 6 soft/bite sized diet with thin liquids at this time.     Goals:   Multidisciplinary Problems       SLP Goals          Problem: SLP    Goal Priority Disciplines Outcome   SLP Goal     SLP Progressing   Description: Speech Language Pathology Goals  Goals expected to be met by    1. Pt will tolerate regular unrestricted diet without any overt clinical signs of aspiration                        Plan:     Patient to be seen:  4 x/week   Plan of Care expires:     Plan of Care reviewed with:  patient, spouse   SLP Follow-Up:  Yes       Discharge recommendations:  High Intensity Therapy     Time Tracking:     SLP Treatment Date:   06/21/24  Speech Start Time:  0843  Speech Stop Time:  0855     Speech Total Time (min):  12 min    Billable Minutes: Treatment Swallowing  Dysfunction 12    06/21/2024

## 2024-06-21 NOTE — ASSESSMENT & PLAN NOTE
Patient with a baseline eGFR that appears to be variable however was noted to be in the 15-20 range in February before his recent IVÁN in May, presents now with fatigue and worsening IVÁN with a creatinine of 9.8 and eGFR of 5.5. BUN was noted to be elevated at 117, other electrolytes were normal. He was noted to have some asterixis on exam (which he states is chronic for him and unchanged from his baseline), however denies any other signs or symptoms of uremia. Bedside bladder scan revealed 200 mL of urine in his bladder and his last bladder void was 10 hours prior to this consult.     SLED initiated 6/13 x4 hours.  Mental status improved.      Recommendations:     -will continue 3x week dialysis   -next session tomorrow  -add on Na bicarb tabs 650 mg po TID  -Avoid nephrotoxic agents (IV contrast, NSAID's, gadolinium contrast, PPI's) if able.   -Strict I's and O's  -Renally dose all medications  -will need OP HD upon discharge, recommend CM/SW involvement to start workup.  -Please consult IR for TDC Placement

## 2024-06-21 NOTE — ASSESSMENT & PLAN NOTE
-HeartMate 3 Implanted  12/1/2023  as DT  -Cont Coumadin, dosing by PharmD.  Goal INR 2.0-3.0. therapeutic today.   -Antiplatelets Not on ASA  -LDH is stable overall today. Will continue to monitor daily.  -Speed set at  5000     -Interrogation notable for no events  -Not listed for OHTx, declined for OHTX due to comorbidities     Procedure: Device Interrogation Including analysis of device parameters  Current Settings: Ventricular Assist Device  Review of device function is stable        6/21/2024    10:00 AM 6/21/2024     9:00 AM 6/21/2024     8:00 AM 6/21/2024     7:01 AM 6/21/2024     7:00 AM 6/21/2024     6:00 AM 6/21/2024     5:01 AM   TXP LVAD INTERROGATIONS   Type HeartMate3 HeartMate3 HeartMate3 HeartMate3 HeartMate3 HeartMate3 HeartMate3   Flow 4.5 4.2 4.4 4.4 4.3 4.1 4.5   Speed 5000 5000 5000 5000 5000 5000 5000   PI 3.5 3.3 3.3 3.3 3.6 4.8 3.8   Power ([place]) 3.4 3.4 3.4 3.4 3.3 3.6 3.4   LSL 4600 4600 4600 4600 4600 4600 4600   Pulsatility Intermittent pulse Intermittent pulse Intermittent pulse Intermittent pulse Intermittent pulse Intermittent pulse Intermittent pulse

## 2024-06-21 NOTE — NURSING
Patient transferred to CSU. Patient arrived to floor from CICU no evidence of distress; patient  Patient placed on tele. Vital signs obtained. Patient voices no complaints at this time. Plan of care initiated with patient. Zeny feeding at 60ml/hr. Bed in lowest position, locked, SR up x2, call bell in reach. Will continue to monitor patient.     Nurses Note -- 4 Eyes      6/21/2024   6:06 PM      Skin assessed during: Transfer      [x] No Altered Skin Integrity Present    []Prevention Measures Documented      [] Yes- Altered Skin Integrity Present or Discovered   [] LDA Added if Not in Epic (Describe Wound)   [] New Altered Skin Integrity was Present on Admit and Documented in LDA   [] Wound Image Taken    Wound Care Consulted? No    Attending Nurse:  Ernestine Avalos RN/Staff Member:  MILLICENT White

## 2024-06-22 LAB
ALBUMIN SERPL BCP-MCNC: 2.1 G/DL (ref 3.5–5.2)
ALLENS TEST: ABNORMAL
ANION GAP SERPL CALC-SCNC: 14 MMOL/L (ref 8–16)
ANION GAP SERPL CALC-SCNC: 15 MMOL/L (ref 8–16)
ANISOCYTOSIS BLD QL SMEAR: SLIGHT
APTT PPP: 47.3 SEC (ref 21–32)
BASOPHILS # BLD AUTO: 0.06 K/UL (ref 0–0.2)
BASOPHILS NFR BLD: 0.6 % (ref 0–1.9)
BUN SERPL-MCNC: 43 MG/DL (ref 8–23)
BUN SERPL-MCNC: 90 MG/DL (ref 8–23)
CALCIUM SERPL-MCNC: 8.7 MG/DL (ref 8.7–10.5)
CALCIUM SERPL-MCNC: 8.7 MG/DL (ref 8.7–10.5)
CHLORIDE SERPL-SCNC: 104 MMOL/L (ref 95–110)
CHLORIDE SERPL-SCNC: 95 MMOL/L (ref 95–110)
CO2 SERPL-SCNC: 17 MMOL/L (ref 23–29)
CO2 SERPL-SCNC: 24 MMOL/L (ref 23–29)
CREAT SERPL-MCNC: 3 MG/DL (ref 0.5–1.4)
CREAT SERPL-MCNC: 4.8 MG/DL (ref 0.5–1.4)
DELSYS: ABNORMAL
DIFFERENTIAL METHOD BLD: ABNORMAL
EOSINOPHIL # BLD AUTO: 0.2 K/UL (ref 0–0.5)
EOSINOPHIL NFR BLD: 2.3 % (ref 0–8)
ERYTHROCYTE [DISTWIDTH] IN BLOOD BY AUTOMATED COUNT: 18.2 % (ref 11.5–14.5)
EST. GFR  (NO RACE VARIABLE): 13 ML/MIN/1.73 M^2
EST. GFR  (NO RACE VARIABLE): 22.8 ML/MIN/1.73 M^2
GLUCOSE SERPL-MCNC: 102 MG/DL (ref 70–110)
GLUCOSE SERPL-MCNC: 218 MG/DL (ref 70–110)
HCO3 UR-SCNC: 30.4 MMOL/L (ref 24–28)
HCT VFR BLD AUTO: 24.6 % (ref 40–54)
HGB BLD-MCNC: 8.2 G/DL (ref 14–18)
HYPOCHROMIA BLD QL SMEAR: ABNORMAL
IMM GRANULOCYTES # BLD AUTO: 0.23 K/UL (ref 0–0.04)
IMM GRANULOCYTES NFR BLD AUTO: 2.3 % (ref 0–0.5)
INR PPP: 2.5 (ref 0.8–1.2)
LDH SERPL L TO P-CCNC: 395 U/L (ref 110–260)
LYMPHOCYTES # BLD AUTO: 0.7 K/UL (ref 1–4.8)
LYMPHOCYTES NFR BLD: 7.4 % (ref 18–48)
MAGNESIUM SERPL-MCNC: 2.5 MG/DL (ref 1.6–2.6)
MCH RBC QN AUTO: 28.1 PG (ref 27–31)
MCHC RBC AUTO-ENTMCNC: 33.3 G/DL (ref 32–36)
MCV RBC AUTO: 84 FL (ref 82–98)
MONOCYTES # BLD AUTO: 0.7 K/UL (ref 0.3–1)
MONOCYTES NFR BLD: 7.4 % (ref 4–15)
NEUTROPHILS # BLD AUTO: 7.9 K/UL (ref 1.8–7.7)
NEUTROPHILS NFR BLD: 80 % (ref 38–73)
NRBC BLD-RTO: 0 /100 WBC
OVALOCYTES BLD QL SMEAR: ABNORMAL
PCO2 BLDA: 39.2 MMHG (ref 35–45)
PH SMN: 7.5 [PH] (ref 7.35–7.45)
PHOSPHATE SERPL-MCNC: 1.2 MG/DL (ref 2.7–4.5)
PHOSPHATE SERPL-MCNC: 2.5 MG/DL (ref 2.7–4.5)
PLATELET # BLD AUTO: 346 K/UL (ref 150–450)
PLATELET BLD QL SMEAR: ABNORMAL
PMV BLD AUTO: 10.8 FL (ref 9.2–12.9)
PO2 BLDA: 23 MMHG (ref 40–60)
POC BE: 7 MMOL/L
POC SATURATED O2: 47 % (ref 95–100)
POC TCO2: 32 MMOL/L (ref 24–29)
POCT GLUCOSE: 103 MG/DL (ref 70–110)
POCT GLUCOSE: 153 MG/DL (ref 70–110)
POCT GLUCOSE: 177 MG/DL (ref 70–110)
POIKILOCYTOSIS BLD QL SMEAR: SLIGHT
POLYCHROMASIA BLD QL SMEAR: ABNORMAL
POTASSIUM SERPL-SCNC: 3.7 MMOL/L (ref 3.5–5.1)
POTASSIUM SERPL-SCNC: 4 MMOL/L (ref 3.5–5.1)
PROTHROMBIN TIME: 26.1 SEC (ref 9–12.5)
RBC # BLD AUTO: 2.92 M/UL (ref 4.6–6.2)
SAMPLE: ABNORMAL
SITE: ABNORMAL
SODIUM SERPL-SCNC: 134 MMOL/L (ref 136–145)
SODIUM SERPL-SCNC: 135 MMOL/L (ref 136–145)
TIME NOTIFIED: 609
WBC # BLD AUTO: 9.83 K/UL (ref 3.9–12.7)

## 2024-06-22 PROCEDURE — 85730 THROMBOPLASTIN TIME PARTIAL: CPT | Performed by: STUDENT IN AN ORGANIZED HEALTH CARE EDUCATION/TRAINING PROGRAM

## 2024-06-22 PROCEDURE — 83735 ASSAY OF MAGNESIUM: CPT | Performed by: STUDENT IN AN ORGANIZED HEALTH CARE EDUCATION/TRAINING PROGRAM

## 2024-06-22 PROCEDURE — 99900035 HC TECH TIME PER 15 MIN (STAT)

## 2024-06-22 PROCEDURE — 80100014 HC HEMODIALYSIS 1:1

## 2024-06-22 PROCEDURE — 93750 INTERROGATION VAD IN PERSON: CPT | Mod: ,,, | Performed by: INTERNAL MEDICINE

## 2024-06-22 PROCEDURE — 84100 ASSAY OF PHOSPHORUS: CPT | Performed by: STUDENT IN AN ORGANIZED HEALTH CARE EDUCATION/TRAINING PROGRAM

## 2024-06-22 PROCEDURE — 27000248 HC VAD-ADDITIONAL DAY

## 2024-06-22 PROCEDURE — 25000003 PHARM REV CODE 250: Performed by: PHYSICIAN ASSISTANT

## 2024-06-22 PROCEDURE — 85610 PROTHROMBIN TIME: CPT | Performed by: STUDENT IN AN ORGANIZED HEALTH CARE EDUCATION/TRAINING PROGRAM

## 2024-06-22 PROCEDURE — 36415 COLL VENOUS BLD VENIPUNCTURE: CPT | Performed by: STUDENT IN AN ORGANIZED HEALTH CARE EDUCATION/TRAINING PROGRAM

## 2024-06-22 PROCEDURE — 83615 LACTATE (LD) (LDH) ENZYME: CPT | Performed by: STUDENT IN AN ORGANIZED HEALTH CARE EDUCATION/TRAINING PROGRAM

## 2024-06-22 PROCEDURE — 99233 SBSQ HOSP IP/OBS HIGH 50: CPT | Mod: 25,,, | Performed by: PHYSICIAN ASSISTANT

## 2024-06-22 PROCEDURE — 80048 BASIC METABOLIC PNL TOTAL CA: CPT | Performed by: STUDENT IN AN ORGANIZED HEALTH CARE EDUCATION/TRAINING PROGRAM

## 2024-06-22 PROCEDURE — 20600001 HC STEP DOWN PRIVATE ROOM

## 2024-06-22 PROCEDURE — 80069 RENAL FUNCTION PANEL: CPT | Performed by: STUDENT IN AN ORGANIZED HEALTH CARE EDUCATION/TRAINING PROGRAM

## 2024-06-22 PROCEDURE — 99233 SBSQ HOSP IP/OBS HIGH 50: CPT | Mod: ,,, | Performed by: HOSPITALIST

## 2024-06-22 PROCEDURE — 25000003 PHARM REV CODE 250: Performed by: REGISTERED NURSE

## 2024-06-22 PROCEDURE — 94761 N-INVAS EAR/PLS OXIMETRY MLT: CPT

## 2024-06-22 PROCEDURE — 25000003 PHARM REV CODE 250: Performed by: STUDENT IN AN ORGANIZED HEALTH CARE EDUCATION/TRAINING PROGRAM

## 2024-06-22 PROCEDURE — 85025 COMPLETE CBC W/AUTO DIFF WBC: CPT | Performed by: STUDENT IN AN ORGANIZED HEALTH CARE EDUCATION/TRAINING PROGRAM

## 2024-06-22 PROCEDURE — 25000003 PHARM REV CODE 250: Performed by: INTERNAL MEDICINE

## 2024-06-22 PROCEDURE — 99232 SBSQ HOSP IP/OBS MODERATE 35: CPT | Mod: ,,, | Performed by: NURSE PRACTITIONER

## 2024-06-22 PROCEDURE — 25000003 PHARM REV CODE 250: Performed by: NURSE PRACTITIONER

## 2024-06-22 PROCEDURE — 82803 BLOOD GASES ANY COMBINATION: CPT

## 2024-06-22 RX ORDER — AMOXICILLIN 400 MG/5ML
500 POWDER, FOR SUSPENSION ORAL EVERY 24 HOURS
Status: DISCONTINUED | OUTPATIENT
Start: 2024-06-22 | End: 2024-07-01 | Stop reason: HOSPADM

## 2024-06-22 RX ADMIN — MIDODRINE HYDROCHLORIDE 10 MG: 5 TABLET ORAL at 09:06

## 2024-06-22 RX ADMIN — VENLAFAXINE 37.5 MG: 37.5 TABLET ORAL at 08:06

## 2024-06-22 RX ADMIN — SODIUM BICARBONATE 650 MG TABLET 650 MG: at 09:06

## 2024-06-22 RX ADMIN — QUETIAPINE FUMARATE 100 MG: 25 TABLET ORAL at 09:06

## 2024-06-22 RX ADMIN — GABAPENTIN 100 MG: 100 CAPSULE ORAL at 09:06

## 2024-06-22 RX ADMIN — SODIUM BICARBONATE 650 MG TABLET 650 MG: at 08:06

## 2024-06-22 RX ADMIN — TRAZODONE HYDROCHLORIDE 25 MG: 50 TABLET ORAL at 09:06

## 2024-06-22 RX ADMIN — CHOLECALCIFEROL TAB 25 MCG (1000 UNIT) 1000 UNITS: 25 TAB at 08:06

## 2024-06-22 RX ADMIN — SODIUM BICARBONATE 650 MG TABLET 650 MG: at 02:06

## 2024-06-22 RX ADMIN — MIDODRINE HYDROCHLORIDE 10 MG: 5 TABLET ORAL at 06:06

## 2024-06-22 RX ADMIN — INSULIN ASPART 5 UNITS: 100 INJECTION, SOLUTION INTRAVENOUS; SUBCUTANEOUS at 04:06

## 2024-06-22 RX ADMIN — PANTOPRAZOLE SODIUM 40 MG: 40 TABLET, DELAYED RELEASE ORAL at 09:06

## 2024-06-22 RX ADMIN — ATORVASTATIN CALCIUM 40 MG: 40 TABLET, FILM COATED ORAL at 09:06

## 2024-06-22 RX ADMIN — WARFARIN SODIUM 2.5 MG: 2.5 TABLET ORAL at 05:06

## 2024-06-22 RX ADMIN — GABAPENTIN 100 MG: 100 CAPSULE ORAL at 08:06

## 2024-06-22 RX ADMIN — FOLIC ACID 1 MG: 1 TABLET ORAL at 08:06

## 2024-06-22 RX ADMIN — MIDODRINE HYDROCHLORIDE 10 MG: 5 TABLET ORAL at 02:06

## 2024-06-22 RX ADMIN — AMOXICILLIN 500 MG: 400 POWDER, FOR SUSPENSION ORAL at 09:06

## 2024-06-22 RX ADMIN — AMIODARONE HYDROCHLORIDE 400 MG: 200 TABLET ORAL at 08:06

## 2024-06-22 RX ADMIN — INSULIN ASPART 5 UNITS: 100 INJECTION, SOLUTION INTRAVENOUS; SUBCUTANEOUS at 06:06

## 2024-06-22 NOTE — SUBJECTIVE & OBJECTIVE
Interval History: No acute events overnight. Tolerated 3 hours of HD early this AM. Total UF of 2L. NGT stopped working and would not flush so NGT removed will do trial of oral diet today, is cleared for soft and bite sized diet.       Continuous Infusions:   0.9% NaCl   Intravenous Continuous   Stopped at 06/13/24 0117    D10W   Intravenous Continuous PRN         Scheduled Meds:   0.9% NaCl   Intravenous Once    amiodarone  400 mg Oral Daily    amoxicillin  500 mg Per NG tube Daily    atorvastatin  40 mg Oral QHS    epoetin zohaib (PROCRIT) injection  4,000 Units Intravenous Every Mon, Wed, Fri    folic acid  1 mg Oral Daily    gabapentin  100 mg Oral BID    insulin aspart U-100  5 Units Subcutaneous 6 times per day    LIDOcaine  2 patch Transdermal Q24H    midodrine  10 mg Oral Q8H    pantoprazole  40 mg Oral Daily    QUEtiapine  100 mg Oral QHS    sodium bicarbonate  650 mg Oral TID    venlafaxine  37.5 mg Oral Daily    vitamin D  1,000 Units Oral Daily    warfarin  2.5 mg Oral Daily     PRN Meds:  Current Facility-Administered Medications:     0.9%  NaCl infusion (for blood administration), , Intravenous, Q24H PRN    0.9% NaCl, , Intravenous, PRN    acetaminophen, 650 mg, Oral, Q6H PRN    acetaminophen, 650 mg, Per NG tube, Q6H PRN    D10W, , Intravenous, Continuous PRN    dextrose 10%, 12.5 g, Intravenous, PRN    dextrose 10%, 25 g, Intravenous, PRN    glucagon (human recombinant), 1 mg, Intramuscular, PRN    insulin aspart U-100, 0-5 Units, Subcutaneous, Q4H PRN    simethicone, 1 tablet, Oral, TID PRN    traZODone, 25 mg, Oral, Nightly PRN    Review of patient's allergies indicates:  No Known Allergies  Objective:     Vital Signs (Most Recent):  Temp: 99 °F (37.2 °C) (06/22/24 0709)  Pulse: 100 (06/22/24 0709)  Resp: 18 (06/22/24 0709)  BP: (!) 58/0 (06/22/24 0830)  SpO2: (!) 91 % (06/22/24 0709) Vital Signs (24h Range):  Temp:  [97.4 °F (36.3 °C)-99.2 °F (37.3 °C)] 99 °F (37.2 °C)  Pulse:  [] 100  Resp:   [16-26] 18  SpO2:  [91 %-99 %] 91 %  BP: ()/(0-73) 58/0     Patient Vitals for the past 72 hrs (Last 3 readings):   Weight   06/22/24 0627 76.4 kg (168 lb 6.9 oz)   06/20/24 0601 76.3 kg (168 lb 3.4 oz)     Body mass index is 22.22 kg/m².      Intake/Output Summary (Last 24 hours) at 6/22/2024 0957  Last data filed at 6/22/2024 0555  Gross per 24 hour   Intake 923.83 ml   Output 2000 ml   Net -1076.17 ml         Telemetry: SR       Physical Exam  Constitutional:       Appearance: Normal appearance.   HENT:      Head: Normocephalic and atraumatic.   Eyes:      Conjunctiva/sclera: Conjunctivae normal.      Pupils: Pupils are equal, round, and reactive to light.   Neck:      Comments: No JVD   Cardiovascular:      Rate and Rhythm: Normal rate and regular rhythm.      Comments: Smooth VAD hum  Pulmonary:      Effort: Pulmonary effort is normal.      Breath sounds: Normal breath sounds.   Abdominal:      General: Bowel sounds are normal.      Palpations: Abdomen is soft.   Musculoskeletal:         General: No swelling. Normal range of motion.      Cervical back: Normal range of motion and neck supple.   Skin:     General: Skin is warm and dry.      Capillary Refill: Capillary refill takes 2 to 3 seconds.   Neurological:      General: No focal deficit present.      Mental Status: He is alert. Mental status is at baseline.      Comments: Oriented x3            Significant Labs:  CBC:  Recent Labs   Lab 06/20/24  0308 06/21/24  0451 06/22/24  0232   WBC 7.05 10.12 9.83   RBC 2.90* 3.03* 2.92*   HGB 8.1* 8.4* 8.2*   HCT 24.7* 26.5* 24.6*    334 346   MCV 85 88 84   MCH 27.9 27.7 28.1   MCHC 32.8 31.7* 33.3     BNP:  Recent Labs   Lab 06/17/24  0319 06/19/24  0312 06/21/24  0451   BNP 1,023* 823* 1,328*     CMP:  Recent Labs   Lab 06/18/24  1816 06/19/24  0312 06/20/24  0308 06/21/24  0451 06/22/24  0232 06/22/24  0808   * 126*   < > 198* 102 218*   CALCIUM 8.8 8.3*   < > 8.7 8.7 8.7   ALBUMIN 2.4* 2.1*   --  2.1*  --  2.1*   PROT 7.8 7.2  --  7.7  --   --    * 131*   < > 134* 135* 134*   K 4.1 3.7   < > 4.0 4.0 3.7   CO2 18* 18*   < > 16* 17* 24    102   < > 106 104 95   BUN 17 32*   < > 54* 90* 43*   CREATININE 1.8* 2.7*   < > 3.7* 4.8* 3.0*   ALKPHOS 381* 369*  --  387*  --   --    * 159*  --  104*  --   --    * 110*  --  67*  --   --    BILITOT 0.8 0.5  --  0.4  --   --     < > = values in this interval not displayed.      Coagulation:   Recent Labs   Lab 06/20/24 0308 06/21/24  0451 06/22/24  0232   INR 2.4* 2.5* 2.5*   APTT 42.4* 44.6* 47.3*     LDH:  Recent Labs   Lab 06/20/24 0308 06/21/24  0451 06/22/24  0232   * 375* 395*     Microbiology:  Microbiology Results (last 7 days)       Procedure Component Value Units Date/Time    Culture, Respiratory with Gram Stain [[id number]]     Order Status: No result Specimen: Respiratory     Blood culture #1 **CANNOT BE ORDERED STAT** [[id number]] Collected: 06/11/24 2122    Order Status: Completed Specimen: Blood from Peripheral, Antecubital, Right Updated: 06/16/24 2212     Blood Culture, Routine No growth after 5 days.    Blood culture #2 **CANNOT BE ORDERED STAT** [[id number]] Collected: 06/11/24 2121    Order Status: Completed Specimen: Blood from Peripheral, Hand, Left Updated: 06/16/24 2212     Blood Culture, Routine No growth after 5 days.            I have reviewed all pertinent labs within the past 24 hours.    Estimated Creatinine Clearance: 27.6 mL/min (A) (based on SCr of 3 mg/dL (H)).    Diagnostic Results:  I have reviewed and interpreted all pertinent imaging results/findings within the past 24 hours.

## 2024-06-22 NOTE — PROGRESS NOTES
[facility] - Cardiology Stepdown  Endocrinology  Progress Note    Admit Date: 6/11/2024     Reason for Consult: Management of T2DM, Hyperglycemia     Surgical Procedure and Date: HM3 placement as DT (12/1/23)     Diabetes diagnosis year: several years ago    Home Diabetes Medications:  Amaryl 1 mg (recently prescribed by PCP about a week ago)     Lab Results   Component Value Date    HGBA1C 7.4 (H) 04/17/2024       How often checking glucose at home? Once daily in the AM ( prior to starting Amaryl BG in the 300s, since starting Amaryl BG 70-130s)   BG readings on regimen: 70-130s  Hypoglycemia on the regimen?  No  Missed doses on regimen?  No    Diabetes Complications include:     Hypoglycemia , Diabetic nephropathy  , Diabetic chronic kidney disease     , and Diabetic peripheral neuropathy     Complicating diabetes co morbidities:   CHF and CKD      HPI:   Patient is a 64 y.o. male with a diagnosis of stage D HFrEF, ICMP who underwent HM3 placement as DT (12/1/23) w/ a hospital stay complicated by vasoplegia(requiring Giaprezza), debility, malnutrition/impaired swallowing, and renal failure requiring HD (since weaned off) w/ recent discharge for ADHF presented to the ED with wife due to c/o worsening generalized weakness since 6/10. Per wife patient was mostly sleeping during the day despite sleeping 10 hrs at night. Patient also admits decreased appetite and decreased urine output despite taking his medications as prescribed. He has chronic shortness of breath on exertion. Denies confusion, syncope, diarrhea, constipation, N/V, dysuria, or other complaints. Patient had his prior tunnel cath removed used for HD about 2 weeks ago. He completed a 6 week therapy for Enterococcus bacteremia with ampicillin and rocephin ending 5/30/24 and was switched to PO antibiotics. Off note, patient was recently started by his PCP on glimepiride 1mg and unclear of BG trends during the day. Per patient last fasting  on 6/11.  "Patient reports lasting taking the glimepiride on  AM.   Admitted for worsening renal function with a BUN>100/Cr. 9.9. Endocrinology consulted for management of T2DM.          Interval HPI:   Overnight events: NAEON. Remains in CICU. BG variable on current SQ insulin regimen (103-318). Creatinine 4.8; HD. Tube feeds held.  Diet Minced & Moist (IDDSI Level 5) Fluid - 1500mL  Diet NPO    Eatin%  Nausea: No  Hypoglycemia and intervention: No  Fever: No  TPN and/or TF: Yes  If yes, type of TF/TPN and rate: TFs on hold as NG tube clogged    BP (!) 78/52   Pulse 100   Temp 99 °F (37.2 °C) (Axillary)   Resp 18   Ht 6' 1" (1.854 m)   Wt 76.4 kg (168 lb 6.9 oz)   SpO2 (!) 91%   BMI 22.22 kg/m²     Labs Reviewed and Include    Recent Labs   Lab 24  0232      CALCIUM 8.7   *   K 4.0   CO2 17*      BUN 90*   CREATININE 4.8*     Lab Results   Component Value Date    WBC 9.83 2024    HGB 8.2 (L) 2024    HCT 24.6 (L) 2024    MCV 84 2024     2024     No results for input(s): "TSH", "FREET4" in the last 168 hours.  Lab Results   Component Value Date    HGBA1C 7.4 (H) 2024       Nutritional status:   Body mass index is 22.22 kg/m².  Lab Results   Component Value Date    ALBUMIN 2.1 (L) 2024    ALBUMIN 2.1 (L) 2024    ALBUMIN 2.4 (L) 2024     Lab Results   Component Value Date    PREALBUMIN 16 (L) 2024    PREALBUMIN 14 (L) 2024    PREALBUMIN 15 (L) 2024       Estimated Creatinine Clearance: 17.2 mL/min (A) (based on SCr of 4.8 mg/dL (H)).    Accu-Checks  Recent Labs     24  0908 24  1245 24  1748 24  0920 24  1131 24  1758 24  2206 24  0210 24  0602   POCTGLUCOSE 274* 348* 173* 208* 416* 330* 197* 318* 103 177*       Current Medications and/or Treatments Impacting Glycemic Control  Immunotherapy:    Immunosuppressants       None      "     Steroids:   Hormones (From admission, onward)      None          Pressors:    Autonomic Drugs (From admission, onward)      Start     Stop Route Frequency Ordered    06/19/24 1415  midodrine tablet 10 mg         -- Oral Every 8 hours 06/19/24 1414          Hyperglycemia/Diabetes Medications:   Antihyperglycemics (From admission, onward)      Start     Stop Route Frequency Ordered    06/21/24 1600  insulin aspart U-100 pen 5 Units         -- SubQ 6 times per day 06/21/24 1334    06/21/24 1041  insulin aspart U-100 pen 0-5 Units         -- SubQ Every 4 hours PRN 06/21/24 0941            ASSESSMENT and PLAN    Cardiac/Vascular  LVAD (left ventricular assist device) present  Managed per primary team  Avoid hypoglycemia        PAF (paroxysmal atrial fibrillation)  May increase insulin resistance.         Renal/  * Acute renal failure superimposed on stage 4 chronic kidney disease  Titrate insulin slowly to avoid hypoglycemia as the risk of hypoglycemia increases with decreased creatinine clearance.        Endocrine  Type 2 diabetes mellitus without complication, without long-term current use of insulin  BG goal 140-180      Plan:  -Continue Novolog 5 units q 4 hrs while on tube feeds (hold if tube feeds are stopped for any reason or BG < 100)   -Continue Novolog Low Dose Correction Scale   -BG monitoring q4hr  -Hypoglycemia protocol in place    ** Please call Endocrine for any BG related issues **    Discharge plans:   TBD  -D/C Camila Escobar NP  Endocrinology  [facility] - Cardiology Step[facility]

## 2024-06-22 NOTE — PROGRESS NOTES
[facility] - Cardiology Stepdown  Heart Transplant  Progress Note    Patient Name: Radha Abbott  MRN: [id number]  Admission Date: 6/11/2024  Hospital Length of Stay: 11 days  Attending Physician: Sajan Hurley, *  Primary Care Provider: Vasu Kong MD  Principal Problem:Acute renal failure superimposed on stage 4 chronic kidney disease    Subjective:   Interval History: No acute events overnight. Tolerated 3 hours of HD early this AM. Total UF of 2L. NGT stopped working and would not flush so NGT removed will do trial of oral diet today, is cleared for soft and bite sized diet.       Continuous Infusions:   0.9% NaCl   Intravenous Continuous   Stopped at 06/13/24 0117    D10W   Intravenous Continuous PRN         Scheduled Meds:   0.9% NaCl   Intravenous Once    amiodarone  400 mg Oral Daily    amoxicillin  500 mg Per NG tube Daily    atorvastatin  40 mg Oral QHS    epoetin zohaib (PROCRIT) injection  4,000 Units Intravenous Every Mon, Wed, Fri    folic acid  1 mg Oral Daily    gabapentin  100 mg Oral BID    insulin aspart U-100  5 Units Subcutaneous 6 times per day    LIDOcaine  2 patch Transdermal Q24H    midodrine  10 mg Oral Q8H    pantoprazole  40 mg Oral Daily    QUEtiapine  100 mg Oral QHS    sodium bicarbonate  650 mg Oral TID    venlafaxine  37.5 mg Oral Daily    vitamin D  1,000 Units Oral Daily    warfarin  2.5 mg Oral Daily     PRN Meds:  Current Facility-Administered Medications:     0.9%  NaCl infusion (for blood administration), , Intravenous, Q24H PRN    0.9% NaCl, , Intravenous, PRN    acetaminophen, 650 mg, Oral, Q6H PRN    acetaminophen, 650 mg, Per NG tube, Q6H PRN    D10W, , Intravenous, Continuous PRN    dextrose 10%, 12.5 g, Intravenous, PRN    dextrose 10%, 25 g, Intravenous, PRN    glucagon (human recombinant), 1 mg, Intramuscular, PRN    insulin aspart U-100, 0-5 Units, Subcutaneous, Q4H PRN    simethicone, 1 tablet, Oral, TID PRN    traZODone, 25 mg, Oral, Nightly PRN    Review  of patient's allergies indicates:  No Known Allergies  Objective:     Vital Signs (Most Recent):  Temp: 99 °F (37.2 °C) (06/22/24 0709)  Pulse: 100 (06/22/24 0709)  Resp: 18 (06/22/24 0709)  BP: (!) 58/0 (06/22/24 0830)  SpO2: (!) 91 % (06/22/24 0709) Vital Signs (24h Range):  Temp:  [97.4 °F (36.3 °C)-99.2 °F (37.3 °C)] 99 °F (37.2 °C)  Pulse:  [] 100  Resp:  [16-26] 18  SpO2:  [91 %-99 %] 91 %  BP: ()/(0-73) 58/0     Patient Vitals for the past 72 hrs (Last 3 readings):   Weight   06/22/24 0627 76.4 kg (168 lb 6.9 oz)   06/20/24 0601 76.3 kg (168 lb 3.4 oz)     Body mass index is 22.22 kg/m².      Intake/Output Summary (Last 24 hours) at 6/22/2024 0957  Last data filed at 6/22/2024 0555  Gross per 24 hour   Intake 923.83 ml   Output 2000 ml   Net -1076.17 ml         Telemetry: SR       Physical Exam  Constitutional:       Appearance: Normal appearance.   HENT:      Head: Normocephalic and atraumatic.   Eyes:      Conjunctiva/sclera: Conjunctivae normal.      Pupils: Pupils are equal, round, and reactive to light.   Neck:      Comments: No JVD   Cardiovascular:      Rate and Rhythm: Normal rate and regular rhythm.      Comments: Smooth VAD hum  Pulmonary:      Effort: Pulmonary effort is normal.      Breath sounds: Normal breath sounds.   Abdominal:      General: Bowel sounds are normal.      Palpations: Abdomen is soft.   Musculoskeletal:         General: No swelling. Normal range of motion.      Cervical back: Normal range of motion and neck supple.   Skin:     General: Skin is warm and dry.      Capillary Refill: Capillary refill takes 2 to 3 seconds.   Neurological:      General: No focal deficit present.      Mental Status: He is alert. Mental status is at baseline.      Comments: Oriented x3            Significant Labs:  CBC:  Recent Labs   Lab 06/20/24  0308 06/21/24  0451 06/22/24  0232   WBC 7.05 10.12 9.83   RBC 2.90* 3.03* 2.92*   HGB 8.1* 8.4* 8.2*   HCT 24.7* 26.5* 24.6*    448 346    MCV 85 88 84   MCH 27.9 27.7 28.1   MCHC 32.8 31.7* 33.3     BNP:  Recent Labs   Lab 06/17/24  0319 06/19/24 0312 06/21/24  0451   BNP 1,023* 823* 1,328*     CMP:  Recent Labs   Lab 06/18/24  1816 06/19/24  0312 06/20/24  0308 06/21/24 0451 06/22/24  0232 06/22/24  0808   * 126*   < > 198* 102 218*   CALCIUM 8.8 8.3*   < > 8.7 8.7 8.7   ALBUMIN 2.4* 2.1*  --  2.1*  --  2.1*   PROT 7.8 7.2  --  7.7  --   --    * 131*   < > 134* 135* 134*   K 4.1 3.7   < > 4.0 4.0 3.7   CO2 18* 18*   < > 16* 17* 24    102   < > 106 104 95   BUN 17 32*   < > 54* 90* 43*   CREATININE 1.8* 2.7*   < > 3.7* 4.8* 3.0*   ALKPHOS 381* 369*  --  387*  --   --    * 159*  --  104*  --   --    * 110*  --  67*  --   --    BILITOT 0.8 0.5  --  0.4  --   --     < > = values in this interval not displayed.      Coagulation:   Recent Labs   Lab 06/20/24 0308 06/21/24 0451 06/22/24 0232   INR 2.4* 2.5* 2.5*   APTT 42.4* 44.6* 47.3*     LDH:  Recent Labs   Lab 06/20/24 0308 06/21/24 0451 06/22/24  0232   * 375* 395*     Microbiology:  Microbiology Results (last 7 days)       Procedure Component Value Units Date/Time    Culture, Respiratory with Gram Stain [[id number]]     Order Status: No result Specimen: Respiratory     Blood culture #1 **CANNOT BE ORDERED STAT** [[id number]] Collected: 06/11/24 2122    Order Status: Completed Specimen: Blood from Peripheral, Antecubital, Right Updated: 06/16/24 2212     Blood Culture, Routine No growth after 5 days.    Blood culture #2 **CANNOT BE ORDERED STAT** [[id number]] Collected: 06/11/24 2121    Order Status: Completed Specimen: Blood from Peripheral, Hand, Left Updated: 06/16/24 2212     Blood Culture, Routine No growth after 5 days.            I have reviewed all pertinent labs within the past 24 hours.    Estimated Creatinine Clearance: 27.6 mL/min (A) (based on SCr of 3 mg/dL (H)).    Diagnostic Results:  I have reviewed and interpreted all pertinent imaging  results/findings within the past 24 hours.  Assessment and Plan:     Mr. Radha Abbott is a 65 yo male with stage D HFrEF, ICMP who underwent HM3 placement as DT (12/1/23) w/ a hospital stay complicated by vasoplegia(requiring Giaprezza), debility, malnutrition/impaired swallowing, and renal failure requiring HD (since weaned off) w/ recent discharge for ADHF presented to the ED with wife due to c/o worsening generalized weakness since 6/10. Per wife patient was mostly sleeping during the day despite sleeping 10 hrs at night. Patient also admits decreased appetite and decreased urine output despite taking his medications as prescribed. He has not urinated since 6/11 at 2pm. He has chronic shortness of breath on exertion. Denies confusion, syncope, diarrhea, constipation, N/V, dysuria, or other complaints. Patient had his prior tunnel cath removed used for HD about 2 weeks ago. He completed a 6 week therapy for Enterococcus bacteremia with ampicillin and rocephin ending 5/30/24a and was switched to PO antibiotics.     On prior hospital stay nephrology consulted during hospital stay due to elevated renal function. They feel he is close to being a dialysis patient. He diuresed on IV Bumex with prn Metolazone. He is net negative 1.2L throughout his hospital stay and weight on day of discharge was 179lbs down from 186lbs on admit. We continued his home dose of Bumex 4mg BID but increased his prn Metolazone to 10mg. He received a dose of Epo while inpatient and he has plans to follow up with Heme/Onc to get outpatient therapy arranged.      2D Echo with CFD done on 3/26/2024  LVAD: There is a Heartmate III LVAD running at 5000 RPM. The aortic valve does not open. The ventricular septum is at midline. Inflow cannula well seated at the apex. Outflow graft not visualized.   Left Ventricle: The left ventricle is moderately dilated. Normal wall thickness. Global hypokinesis present. Septal motion is abnormal. There is severely  reduced systolic function with a visually estimated ejection fraction of 5 - 10%. There is diastolic dysfunction but grade cannot be determined.   Right Ventricle: Mild right ventricular enlargement. Wall thickness is normal. Right ventricle wall motion is normal. Systolic function is normal. Pacemaker lead present in the ventricle.   Left Atrium: Left atrium is severely dilated.   Right Atrium: Right atrium is moderately dilated.   Mitral Valve: The mitral valve is repaired with an Noble stitch. There is mild regurgitation.   IVC/SVC: Normal venous pressure at 3 mmHg.              * Acute renal failure superimposed on stage 4 chronic kidney disease  Mr. Radha Abbott is a 65 yo male with stage D HFrEF, ICMP who underwent HM3 placement as DT, DM2, CKD4, HTN, E.faecalis bacteremia who presented due to c/o worsening generalized weakness since 6/10. Admitted for worsening renal function with a BUN>100/Cr. 9.9.     - CT Ab/pel: Kidneys/ Ureters: Normal in size and location.  Mild bilateral perinephric stranding, a nonspecific finding and similar to prior.  No hydronephrosis or nephrolithiasis. No ureteral dilatation.  - Nephrology consulted, appreciate recs.   - HD per nephro   - Will need OP HD upon discharge, recommend CM/SW involvement to start workup.  - Will need IR evaluation once out of ICU for TDC placement.  - Renally dosing medications      Aspiration pneumonia of both lungs  -CT chest and CXR concerning for pneumonia   -tolerating room air   - de-escalate to Amoxicillin     Delirium  -Resolved   -CT head-toe neg.   -Continue Seroquel.       Bacteremia due to Enterococcus  He completed a 6 week therapy for Enterococcus bacteremia with ampicillin and rocephin ending 5/30/24a and was switched to Amoxicillin for suppression    Anticoagulant long-term use  Cont warfarin    Anemia  Chronic with no acute bleeding, multifactorial 2/2 CKD4 and chronic anticoagulation  -Iron panel done. Iron deficient but Ferritin is  elevated at 1186  -Homocysteine normal and methylmalonic pending   -s/p Epo   -Replaced IV iron   -Continue to trend CBC's    Adjustment disorder with depressed mood  Cont SSRI    LVAD (left ventricular assist device) present  -HeartMate 3 Implanted  12/1/2023  as DT  -Cont Coumadin, dosing by PharmD.  Goal INR 2.0-3.0. therapeutic today.   -Antiplatelets Not on ASA  -LDH is stable overall today. Will continue to monitor daily.  -Speed set at  5000     -Interrogation notable for no events  -Not listed for OHTx, declined for OHTX due to comorbidities     Procedure: Device Interrogation Including analysis of device parameters  Current Settings: Ventricular Assist Device  Review of device function is stable        6/22/2024     8:26 AM 6/22/2024    12:40 AM 6/21/2024     7:45 PM 6/21/2024     6:03 PM 6/21/2024     2:00 PM 6/21/2024     1:00 PM 6/21/2024    12:00 PM   TXP LVAD INTERROGATIONS   Type HeartMate3 HeartMate3 HeartMate3 HeartMate3 HeartMate3 HeartMate3 HeartMate3   Flow 4.6 4.6 4.3 4.5 4.5 4.5 4.4   Speed 5000 5000 5000 5000 5000 5000 5000   PI 2.7 2.2 3.6 3.2 3.8 4.2 3.7   Power ([place]) 3.5 3.4 3.3 3.4 3.4 3.4 3.5   LSL 4600 4600 4600 4600 4600 4600 4600   Pulsatility Intermittent pulse    Intermittent pulse Intermittent pulse Intermittent pulse         PAF (paroxysmal atrial fibrillation)  -Continue amiodarone and Coumadin      Type 2 diabetes mellitus without complication, without long-term current use of insulin  -Endocrine    Acute on chronic combined systolic and diastolic heart failure  Owing to his advanced kidney disease and significantly elevated creat, he is not on a  ACEI/ARB/ARNI or MRA. He is also not on a beta blocker due to RV dysfunction.     Plan:   -Volume removal with HD   - See LVAD  - See IVÁN    CAD (coronary artery disease)  -Continue statin        Jimy An PA-C  Heart Transplant  [facility] - Cardiology Step[facility]

## 2024-06-22 NOTE — ASSESSMENT & PLAN NOTE
-HeartMate 3 Implanted  12/1/2023  as DT  -Cont Coumadin, dosing by PharmD.  Goal INR 2.0-3.0. therapeutic today.   -Antiplatelets Not on ASA  -LDH is stable overall today. Will continue to monitor daily.  -Speed set at  5000     -Interrogation notable for no events  -Not listed for OHTx, declined for OHTX due to comorbidities     Procedure: Device Interrogation Including analysis of device parameters  Current Settings: Ventricular Assist Device  Review of device function is stable        6/22/2024     8:26 AM 6/22/2024    12:40 AM 6/21/2024     7:45 PM 6/21/2024     6:03 PM 6/21/2024     2:00 PM 6/21/2024     1:00 PM 6/21/2024    12:00 PM   TXP LVAD INTERROGATIONS   Type HeartMate3 HeartMate3 HeartMate3 HeartMate3 HeartMate3 HeartMate3 HeartMate3   Flow 4.6 4.6 4.3 4.5 4.5 4.5 4.4   Speed 5000 5000 5000 5000 5000 5000 5000   PI 2.7 2.2 3.6 3.2 3.8 4.2 3.7   Power ([place]) 3.5 3.4 3.3 3.4 3.4 3.4 3.5   LSL 4600 4600 4600 4600 4600 4600 4600   Pulsatility Intermittent pulse    Intermittent pulse Intermittent pulse Intermittent pulse

## 2024-06-22 NOTE — ASSESSMENT & PLAN NOTE
Patient with a baseline eGFR that appears to be variable however was noted to be in the 15-20 range in February before his recent IVÁN in May, presents now with fatigue and worsening IVÁN with a creatinine of 9.8 and eGFR of 5.5. BUN was noted to be elevated at 117, other electrolytes were normal. He was noted to have some asterixis on exam (which he states is chronic for him and unchanged from his baseline), however denies any other signs or symptoms of uremia. Bedside bladder scan revealed 200 mL of urine in his bladder and his last bladder void was 10 hours prior to this consult.     SLED initiated 6/13 x4 hours.  Mental status improved.      Recommendations:     -will continue 3x week dialysis   -Completed dialysis today, post HD labs are stable.   -Next HD planned for Tuesday pending labs and volume status.   -add on Na bicarb tabs 650 mg po TID  -Avoid nephrotoxic agents (IV contrast, NSAID's, gadolinium contrast, PPI's) if able.   -Strict I's and O's  -Renally dose all medications  -will need OP HD upon discharge, recommend CM/SW involvement to start workup.  -Please consult IR for TDC Placement

## 2024-06-22 NOTE — NURSING
Pt resting in bed. Eyes open spontaneously. Symmetrical rise and fall of chest noted. Respirations even and unlabored. No distress noted on exam. Unable to flush NGT after several attempts.  Wife at bedside and is reqiesting once removed to allow patinet attempts to eat without feeding tube,  NP made aware of status of NGT and wife request.  Clogged NGT tube removed without incident, patient tolerated well.  Will continue to monitor..

## 2024-06-22 NOTE — ASSESSMENT & PLAN NOTE
BG goal 140-180      Plan:  -Continue Novolog 5 units q 4 hrs while on tube feeds (hold if tube feeds are stopped for any reason or BG < 100)   -Continue Novolog Low Dose Correction Scale   -BG monitoring q4hr  -Hypoglycemia protocol in place    ** Please call Endocrine for any BG related issues **    Discharge plans:   TBD  -D/C Amaryl

## 2024-06-22 NOTE — SUBJECTIVE & OBJECTIVE
"Interval HPI:   Overnight events: NAEON. Remains in CICU. BG variable on current SQ insulin regimen (103-318). Creatinine 4.8; HD. Tube feeds held.  Diet Minced & Moist (IDDSI Level 5) Fluid - 1500mL  Diet NPO    Eatin%  Nausea: No  Hypoglycemia and intervention: No  Fever: No  TPN and/or TF: Yes  If yes, type of TF/TPN and rate: TFs on hold as NG tube clogged    BP (!) 78/52   Pulse 100   Temp 99 °F (37.2 °C) (Axillary)   Resp 18   Ht 6' 1" (1.854 m)   Wt 76.4 kg (168 lb 6.9 oz)   SpO2 (!) 91%   BMI 22.22 kg/m²     Labs Reviewed and Include    Recent Labs   Lab 24  023      CALCIUM 8.7   *   K 4.0   CO2 17*      BUN 90*   CREATININE 4.8*     Lab Results   Component Value Date    WBC 9.83 2024    HGB 8.2 (L) 2024    HCT 24.6 (L) 2024    MCV 84 2024     2024     No results for input(s): "TSH", "FREET4" in the last 168 hours.  Lab Results   Component Value Date    HGBA1C 7.4 (H) 2024       Nutritional status:   Body mass index is 22.22 kg/m².  Lab Results   Component Value Date    ALBUMIN 2.1 (L) 2024    ALBUMIN 2.1 (L) 2024    ALBUMIN 2.4 (L) 2024     Lab Results   Component Value Date    PREALBUMIN 16 (L) 2024    PREALBUMIN 14 (L) 2024    PREALBUMIN 15 (L) 2024       Estimated Creatinine Clearance: 17.2 mL/min (A) (based on SCr of 4.8 mg/dL (H)).    Accu-Checks  Recent Labs     24  0908 24  1245 24  1748 24  0920 24  1131 24  1758 24  2206 24  0210 24  0602   POCTGLUCOSE 274* 348* 173* 208* 416* 330* 197* 318* 103 177*       Current Medications and/or Treatments Impacting Glycemic Control  Immunotherapy:    Immunosuppressants       None          Steroids:   Hormones (From admission, onward)      None          Pressors:    Autonomic Drugs (From admission, onward)      Start     Stop Route Frequency Ordered    24 1415  " midodrine tablet 10 mg         -- Oral Every 8 hours 06/19/24 1414          Hyperglycemia/Diabetes Medications:   Antihyperglycemics (From admission, onward)      Start     Stop Route Frequency Ordered    06/21/24 1600  insulin aspart U-100 pen 5 Units         -- SubQ 6 times per day 06/21/24 1334    06/21/24 1041  insulin aspart U-100 pen 0-5 Units         -- SubQ Every 4 hours PRN 06/21/24 0941

## 2024-06-22 NOTE — PROGRESS NOTES
Pharmacist Renal Dose Adjustment Note    Radha Abbott is a 64 y.o. male being treated with the medication amoxicillin    Patient Data:    Vital Signs (Most Recent):  Temp: 99 °F (37.2 °C) (06/22/24 0709)  Pulse: 100 (06/22/24 0709)  Resp: 18 (06/22/24 0709)  BP: (!) 58/0 (06/22/24 0830)  SpO2: (!) 91 % (06/22/24 0709) Vital Signs (72h Range):  Temp:  [97.4 °F (36.3 °C)-99.2 °F (37.3 °C)]   Pulse:  []   Resp:  [12-51]   BP: ()/(0-83)   SpO2:  [91 %-99 %]      Recent Labs   Lab 06/21/24  0451 06/22/24  0232 06/22/24  0808   CREATININE 3.7* 4.8* 3.0*     Serum creatinine: 3 mg/dL (H) 06/22/24 0808  Estimated creatinine clearance: 27.6 mL/min (A)    Amoxicillin 500 mg q12h will be changed to amoxicillin 500 mg q24h given HD 3x/week    Pharmacist's Name: Earline Coates  Pharmacist's Extension: [phone]

## 2024-06-22 NOTE — PROGRESS NOTES
06/22/2024  Monet Aguiar    Current provider:  Sajan Hurley, *    Device interrogation:      6/22/2024     4:15 PM 6/22/2024    12:15 PM 6/22/2024     8:26 AM 6/22/2024    12:40 AM 6/21/2024     7:45 PM 6/21/2024     6:03 PM 6/21/2024     2:00 PM   TXP LVAD INTERROGATIONS   Type HeartMate3 HeartMate3 HeartMate3 HeartMate3 HeartMate3 HeartMate3 HeartMate3   Flow 4.6 4.7 4.6 4.6 4.3 4.5 4.5   Speed 5000 5000 5000 5000 5000 5000 5000   PI 2.5 2.6 2.7 2.2 3.6 3.2 3.8   Power ([place]) 3.4 3.4 3.5 3.4 3.3 3.4 3.4   LSL 4600 4600 4600 4600 4600 4600 4600   Pulsatility  Intermittent pulse Intermittent pulse    Intermittent pulse          Rounded on [facility] to ensure all mechanical assist device settings (IABP or VAD) were appropriate and all parameters were within limits.  I was able to ensure all back up equipment was present, the staff had no issues, and the Perfusion Department daily rounding was complete.      For implantable VADs: Interrogation of Ventricular assist device was performed with analysis of device parameters and review of device function. I have personally reviewed the interrogation findings and agree with findings as stated.     In emergency, the nursing units have been notified to contact the perfusion department either by:  Calling [phone] from 630am to 4pm Mon thru Fri, utilizing the On-Call Finder functionality of Epic and searching for Perfusion, or by contacting the hospital  from 4pm to 630am and on weekends and asking to speak with the perfusionist on call.    4:48 PM

## 2024-06-22 NOTE — ASSESSMENT & PLAN NOTE
Mr. 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 Admitted for worsening renal function with a BUN>100/Cr. 9.9.     - CT Ab/pel: Kidneys/ Ureters: Normal in size and location.  Mild bilateral perinephric stranding, a nonspecific finding and similar to prior.  No hydronephrosis or nephrolithiasis. No ureteral dilatation.  - Nephrology consulted, appreciate recs.   - HD per nephro   - Will need OP HD upon discharge, recommend CM/SW involvement to start workup.  - Will need IR evaluation once out of ICU for TDC placement.  - Renally dosing medications

## 2024-06-22 NOTE — PROGRESS NOTES
[facility] - Cardiology Stepdown  Nephrology  Progress Note    Patient Name: Radha Abbott  MRN: [id number]  Admission Date: 6/11/2024  Hospital Length of Stay: 11 days  Attending Provider: Sajan Hurley, *   Primary Care Physician: Vasu Kong MD  Principal Problem:Acute renal failure superimposed on stage 4 chronic kidney disease    Subjective:     HPI: Mr. Abbott is a 64-year-old man with ischemic cardiomyopathy with most recent TTE showing EF 10 -15% and G3DD s/p [employer] ICM placement previously on chronic dobutamine infusion however now with Heartmate III LVAD placed on 12/01/2023, CAD s/p x3 vessel CABG back in 2009, type 2 diabetes mellitus (most recent hemoglobin A1c 7.4%), hypertension, HLD, history of ventricular fibrillation for which he is on amiodarone, chronic AC with Coumadin, advanced CKD V (baseline creatinine ~6) with recurrent AKIs and previous dialysis dependence (tunneled HD catheter removed in early March of this year). He had a recent hospitalization where he was treated for fluid overload acute on chronic CHF exacerbation and IVÁN thought to be 2/2 type 2 CRS and was subsequently diuresed and discharged with a creatinine of 6.2. He presents to the hospital on 6/11 for concerns of worsening fatigue that started one day prior to him arriving to the hospital, and has had difficulties getting out of bed. He has also noted a decrease in his urine output over the past 36 hours despite Diuretic use. He admits adherence to his Metolazone and Bumex at home, however has not passed any urine in the past 10 hours. Bed side bladder scan revealed 200 mL of urine in his bladder. Upon arrival to the ER, his is hemodynamically stable, Labs showed Hgb of 6.3, electrolytes within acceptable limits, Serum CO2 of 21, BUN of 118, and a creatinine of 9.8. He denies feeling fluid overloaded or short of breath, reports good appetite, denies nausea, vomiting, or metallic taste in his mouth. Chest xray  showed mild central vascular congestion and interstitial pulmonary opacities which appear improved compared to previous studies. CT abdomen/Pelvis without contrast revealed Kidneys that were reported as normal in size and location, and without hydronephrosis or ureteral dilation.     Interval History:   HD completed this morning, three hours and tolerated well. Post HD labs are stable.     Review of patient's allergies indicates:  No Known Allergies  Current Facility-Administered Medications   Medication Frequency    0.9%  NaCl infusion (for blood administration) Q24H PRN    0.9%  NaCl infusion Continuous    0.9%  NaCl infusion PRN    0.9%  NaCl infusion Once    acetaminophen tablet 650 mg Q6H PRN    acetaminophen tablet 650 mg Q6H PRN    amiodarone tablet 400 mg Daily    amoxicillin 400 mg/5 mL suspension 500 mg Q12H    atorvastatin tablet 40 mg QHS    dextrose 10 % infusion Continuous PRN    dextrose 10% bolus 125 mL 125 mL PRN    dextrose 10% bolus 250 mL 250 mL PRN    epoetin zohaib injection 4,000 Units Every Mon, Wed, Fri    folic acid tablet 1 mg Daily    gabapentin capsule 100 mg BID    glucagon (human recombinant) injection 1 mg PRN    insulin aspart U-100 pen 0-5 Units Q4H PRN    insulin aspart U-100 pen 5 Units 6 times per day    LIDOcaine 5 % patch 2 patch Q24H    midodrine tablet 10 mg Q8H    pantoprazole EC tablet 40 mg Daily    QUEtiapine tablet 100 mg QHS    simethicone chewable tablet 80 mg TID PRN    sodium bicarbonate tablet 650 mg TID    trazodone split tablet 25 mg Nightly PRN    venlafaxine tablet 37.5 mg Daily    vitamin D 1000 units tablet 1,000 Units Daily    warfarin (COUMADIN) tablet 2.5 mg Daily       Objective:     Vital Signs (Most Recent):  Temp: 99 °F (37.2 °C) (06/22/24 0709)  Pulse: 100 (06/22/24 0709)  Resp: 18 (06/22/24 0709)  BP: (!) 78/52 (06/22/24 0555)  SpO2: (!) 91 % (06/22/24 0709) Vital Signs (24h Range):  Temp:  [97.4 °F (36.3 °C)-99.2 °F (37.3 °C)] 99 °F (37.2 °C)  Pulse:   [] 100  Resp:  [16-26] 18  SpO2:  [91 %-99 %] 91 %  BP: ()/(0-73) 78/52     Weight: 76.4 kg (168 lb 6.9 oz) (06/22/24 0627)  Body mass index is 22.22 kg/m².  Body surface area is 1.98 meters squared.    I/O last 3 completed shifts:  In: 2366.1 [I.V.:99.9; Other:500; NG/GT:1520; IV Piggyback:246.3]  Out: 2000 [Other:2000]     Physical Exam  Vitals and nursing note reviewed.   Constitutional:       General: He is sleeping.      Appearance: Normal appearance.   HENT:      Head: Normocephalic and atraumatic.   Eyes:      Conjunctiva/sclera: Conjunctivae normal.      Pupils: Pupils are equal, round, and reactive to light.   Neck:      Comments: RIJ trialysis cath.   Cardiovascular:      Rate and Rhythm: Normal rate and regular rhythm.      Comments: Smooth VAD hum  Pulmonary:      Effort: Pulmonary effort is normal. No respiratory distress.      Breath sounds: Normal breath sounds. No wheezing or rales.   Abdominal:      General: Bowel sounds are normal.      Palpations: Abdomen is soft.   Musculoskeletal:         General: No swelling. Normal range of motion.      Cervical back: Normal range of motion and neck supple.      Right lower leg: No edema.      Left lower leg: No edema.   Skin:     General: Skin is warm and dry.   Neurological:      General: No focal deficit present.          Significant Labs:  CBC:   Recent Labs   Lab 06/22/24  0232   WBC 9.83   RBC 2.92*   HGB 8.2*   HCT 24.6*      MCV 84   MCH 28.1   MCHC 33.3     CMP:   Recent Labs   Lab 06/21/24  0451 06/22/24  0232   * 102   CALCIUM 8.7 8.7   ALBUMIN 2.1*  --    PROT 7.7  --    * 135*   K 4.0 4.0   CO2 16* 17*    104   BUN 54* 90*   CREATININE 3.7* 4.8*   ALKPHOS 387*  --    *  --    AST 67*  --    BILITOT 0.4  --       Assessment/Plan:     Cardiac/Vascular  LVAD (left ventricular assist device) present  -HTS following.    PAF (paroxysmal atrial fibrillation)  .    Acute on chronic combined systolic and  diastolic heart failure  UF with HD    CAD (coronary artery disease)  .    Renal/  * Acute renal failure superimposed on stage 4 chronic kidney disease  Patient with a baseline eGFR that appears to be variable however was noted to be in the 15-20 range in February before his recent IVÁN in May, presents now with fatigue and worsening IVÁN with a creatinine of 9.8 and eGFR of 5.5. BUN was noted to be elevated at 117, other electrolytes were normal. He was noted to have some asterixis on exam (which he states is chronic for him and unchanged from his baseline), however denies any other signs or symptoms of uremia. Bedside bladder scan revealed 200 mL of urine in his bladder and his last bladder void was 10 hours prior to this consult.     SLED initiated 6/13 x4 hours.  Mental status improved.      Recommendations:     -will continue 3x week dialysis   -Completed dialysis today, post HD labs are stable.   -Next HD planned for Tuesday pending labs and volume status.   -add on Na bicarb tabs 650 mg po TID  -Avoid nephrotoxic agents (IV contrast, NSAID's, gadolinium contrast, PPI's) if able.   -Strict I's and O's  -Renally dose all medications  -will need OP HD upon discharge, recommend CM/SW involvement to start workup.  -Please consult IR for TDC Placement    Oncology  Anemia  -EPO with HD  -Feraheme 6/19        Thank you for your consult. I will follow-up with patient. Please contact us if you have any additional questions.    Martín Hilliard MD  Nephrology  [facility] - Cardiology Stepdown

## 2024-06-22 NOTE — NURSING
LVAD dressing changed by wife. Noted small amt of blood on chg pad closest to patient. Area marked and endorsed to oncoming shift.

## 2024-06-22 NOTE — SUBJECTIVE & OBJECTIVE
Interval History:   HD completed this morning, three hours and tolerated well. Post HD labs are stable.     Review of patient's allergies indicates:  No Known Allergies  Current Facility-Administered Medications   Medication Frequency    0.9%  NaCl infusion (for blood administration) Q24H PRN    0.9%  NaCl infusion Continuous    0.9%  NaCl infusion PRN    0.9%  NaCl infusion Once    acetaminophen tablet 650 mg Q6H PRN    acetaminophen tablet 650 mg Q6H PRN    amiodarone tablet 400 mg Daily    amoxicillin 400 mg/5 mL suspension 500 mg Q12H    atorvastatin tablet 40 mg QHS    dextrose 10 % infusion Continuous PRN    dextrose 10% bolus 125 mL 125 mL PRN    dextrose 10% bolus 250 mL 250 mL PRN    epoetin zohaib injection 4,000 Units Every Mon, Wed, Fri    folic acid tablet 1 mg Daily    gabapentin capsule 100 mg BID    glucagon (human recombinant) injection 1 mg PRN    insulin aspart U-100 pen 0-5 Units Q4H PRN    insulin aspart U-100 pen 5 Units 6 times per day    LIDOcaine 5 % patch 2 patch Q24H    midodrine tablet 10 mg Q8H    pantoprazole EC tablet 40 mg Daily    QUEtiapine tablet 100 mg QHS    simethicone chewable tablet 80 mg TID PRN    sodium bicarbonate tablet 650 mg TID    trazodone split tablet 25 mg Nightly PRN    venlafaxine tablet 37.5 mg Daily    vitamin D 1000 units tablet 1,000 Units Daily    warfarin (COUMADIN) tablet 2.5 mg Daily       Objective:     Vital Signs (Most Recent):  Temp: 99 °F (37.2 °C) (06/22/24 0709)  Pulse: 100 (06/22/24 0709)  Resp: 18 (06/22/24 0709)  BP: (!) 78/52 (06/22/24 0555)  SpO2: (!) 91 % (06/22/24 0709) Vital Signs (24h Range):  Temp:  [97.4 °F (36.3 °C)-99.2 °F (37.3 °C)] 99 °F (37.2 °C)  Pulse:  [] 100  Resp:  [16-26] 18  SpO2:  [91 %-99 %] 91 %  BP: ()/(0-73) 78/52     Weight: 76.4 kg (168 lb 6.9 oz) (06/22/24 0627)  Body mass index is 22.22 kg/m².  Body surface area is 1.98 meters squared.    I/O last 3 completed shifts:  In: 2366.1 [I.V.:99.9; Other:500;  NG/GT:1520; IV Piggyback:246.3]  Out: 2000 [Other:2000]     Physical Exam  Vitals and nursing note reviewed.   Constitutional:       General: He is sleeping.      Appearance: Normal appearance.   HENT:      Head: Normocephalic and atraumatic.   Eyes:      Conjunctiva/sclera: Conjunctivae normal.      Pupils: Pupils are equal, round, and reactive to light.   Neck:      Comments: RIJ trialysis cath.   Cardiovascular:      Rate and Rhythm: Normal rate and regular rhythm.      Comments: Smooth VAD hum  Pulmonary:      Effort: Pulmonary effort is normal. No respiratory distress.      Breath sounds: Normal breath sounds. No wheezing or rales.   Abdominal:      General: Bowel sounds are normal.      Palpations: Abdomen is soft.   Musculoskeletal:         General: No swelling. Normal range of motion.      Cervical back: Normal range of motion and neck supple.      Right lower leg: No edema.      Left lower leg: No edema.   Skin:     General: Skin is warm and dry.   Neurological:      General: No focal deficit present.          Significant Labs:  CBC:   Recent Labs   Lab 06/22/24  0232   WBC 9.83   RBC 2.92*   HGB 8.2*   HCT 24.6*      MCV 84   MCH 28.1   MCHC 33.3     CMP:   Recent Labs   Lab 06/21/24  0451 06/22/24  0232   * 102   CALCIUM 8.7 8.7   ALBUMIN 2.1*  --    PROT 7.7  --    * 135*   K 4.0 4.0   CO2 16* 17*    104   BUN 54* 90*   CREATININE 3.7* 4.8*   ALKPHOS 387*  --    *  --    AST 67*  --    BILITOT 0.4  --

## 2024-06-22 NOTE — PROGRESS NOTES
06/22/24 0555   Post-Hemodialysis Assessment   Rinseback Volume (mL) 200 mL   Blood Volume Processed (Liters) 53.6 L   Dialyzer Clearance Lightly streaked   Duration of Treatment 180 minutes   Additional Fluid Intake (mL) 500 mL   Total UF (mL) 2000 mL   Net Fluid Removal 1500   Patient Response to Treatment bienvenido well   Post-Hemodialysis Comments stable     HD completed per MD order. Report given to primary RN

## 2024-06-23 LAB
ALLENS TEST: ABNORMAL
ANION GAP SERPL CALC-SCNC: 14 MMOL/L (ref 8–16)
ANISOCYTOSIS BLD QL SMEAR: SLIGHT
APTT PPP: 38.6 SEC (ref 21–32)
BASOPHILS # BLD AUTO: 0.05 K/UL (ref 0–0.2)
BASOPHILS NFR BLD: 0.6 % (ref 0–1.9)
BUN SERPL-MCNC: 65 MG/DL (ref 8–23)
CALCIUM SERPL-MCNC: 9.1 MG/DL (ref 8.7–10.5)
CHLORIDE SERPL-SCNC: 94 MMOL/L (ref 95–110)
CO2 SERPL-SCNC: 26 MMOL/L (ref 23–29)
CREAT SERPL-MCNC: 5 MG/DL (ref 0.5–1.4)
DELSYS: ABNORMAL
DIFFERENTIAL METHOD BLD: ABNORMAL
EOSINOPHIL # BLD AUTO: 0.2 K/UL (ref 0–0.5)
EOSINOPHIL NFR BLD: 2.4 % (ref 0–8)
ERYTHROCYTE [DISTWIDTH] IN BLOOD BY AUTOMATED COUNT: 18.6 % (ref 11.5–14.5)
EST. GFR  (NO RACE VARIABLE): 12.3 ML/MIN/1.73 M^2
FIO2: 21
GLUCOSE SERPL-MCNC: 72 MG/DL (ref 70–110)
HCT VFR BLD AUTO: 25.6 % (ref 40–54)
HGB BLD-MCNC: 8.3 G/DL (ref 14–18)
IMM GRANULOCYTES # BLD AUTO: 0.17 K/UL (ref 0–0.04)
IMM GRANULOCYTES NFR BLD AUTO: 1.9 % (ref 0–0.5)
INR PPP: 2 (ref 0.8–1.2)
LDH SERPL L TO P-CCNC: 368 U/L (ref 110–260)
LYMPHOCYTES # BLD AUTO: 0.9 K/UL (ref 1–4.8)
LYMPHOCYTES NFR BLD: 10.4 % (ref 18–48)
MAGNESIUM SERPL-MCNC: 2.3 MG/DL (ref 1.6–2.6)
MCH RBC QN AUTO: 27.8 PG (ref 27–31)
MCHC RBC AUTO-ENTMCNC: 32.4 G/DL (ref 32–36)
MCV RBC AUTO: 86 FL (ref 82–98)
MODE: ABNORMAL
MONOCYTES # BLD AUTO: 0.7 K/UL (ref 0.3–1)
MONOCYTES NFR BLD: 7.6 % (ref 4–15)
NEUTROPHILS # BLD AUTO: 7 K/UL (ref 1.8–7.7)
NEUTROPHILS NFR BLD: 77.1 % (ref 38–73)
NRBC BLD-RTO: 0 /100 WBC
PHOSPHATE SERPL-MCNC: 4.6 MG/DL (ref 2.7–4.5)
PLATELET # BLD AUTO: 399 K/UL (ref 150–450)
PLATELET BLD QL SMEAR: ABNORMAL
PMV BLD AUTO: 11.1 FL (ref 9.2–12.9)
PO2 BLDA: 30 MMHG (ref 40–60)
POC SATURATED O2: 59 % (ref 95–100)
POCT GLUCOSE: 103 MG/DL (ref 70–110)
POCT GLUCOSE: 119 MG/DL (ref 70–110)
POCT GLUCOSE: 236 MG/DL (ref 70–110)
POLYCHROMASIA BLD QL SMEAR: ABNORMAL
POTASSIUM SERPL-SCNC: 4.4 MMOL/L (ref 3.5–5.1)
PROTHROMBIN TIME: 20.5 SEC (ref 9–12.5)
RBC # BLD AUTO: 2.99 M/UL (ref 4.6–6.2)
SAMPLE: ABNORMAL
SITE: ABNORMAL
SODIUM SERPL-SCNC: 134 MMOL/L (ref 136–145)
WBC # BLD AUTO: 9.06 K/UL (ref 3.9–12.7)

## 2024-06-23 PROCEDURE — 20600001 HC STEP DOWN PRIVATE ROOM

## 2024-06-23 PROCEDURE — 27000248 HC VAD-ADDITIONAL DAY

## 2024-06-23 PROCEDURE — 93750 INTERROGATION VAD IN PERSON: CPT | Mod: ,,, | Performed by: INTERNAL MEDICINE

## 2024-06-23 PROCEDURE — 94761 N-INVAS EAR/PLS OXIMETRY MLT: CPT | Mod: XB

## 2024-06-23 PROCEDURE — 25000003 PHARM REV CODE 250: Performed by: REGISTERED NURSE

## 2024-06-23 PROCEDURE — 84100 ASSAY OF PHOSPHORUS: CPT | Performed by: STUDENT IN AN ORGANIZED HEALTH CARE EDUCATION/TRAINING PROGRAM

## 2024-06-23 PROCEDURE — 25000003 PHARM REV CODE 250: Performed by: INTERNAL MEDICINE

## 2024-06-23 PROCEDURE — 25000003 PHARM REV CODE 250: Performed by: STUDENT IN AN ORGANIZED HEALTH CARE EDUCATION/TRAINING PROGRAM

## 2024-06-23 PROCEDURE — 25000003 PHARM REV CODE 250: Performed by: NURSE PRACTITIONER

## 2024-06-23 PROCEDURE — 99900035 HC TECH TIME PER 15 MIN (STAT)

## 2024-06-23 PROCEDURE — 85610 PROTHROMBIN TIME: CPT | Performed by: STUDENT IN AN ORGANIZED HEALTH CARE EDUCATION/TRAINING PROGRAM

## 2024-06-23 PROCEDURE — 83615 LACTATE (LD) (LDH) ENZYME: CPT | Performed by: STUDENT IN AN ORGANIZED HEALTH CARE EDUCATION/TRAINING PROGRAM

## 2024-06-23 PROCEDURE — 99233 SBSQ HOSP IP/OBS HIGH 50: CPT | Mod: 25,,, | Performed by: PHYSICIAN ASSISTANT

## 2024-06-23 PROCEDURE — 36415 COLL VENOUS BLD VENIPUNCTURE: CPT | Performed by: STUDENT IN AN ORGANIZED HEALTH CARE EDUCATION/TRAINING PROGRAM

## 2024-06-23 PROCEDURE — 63600175 PHARM REV CODE 636 W HCPCS: Performed by: NURSE PRACTITIONER

## 2024-06-23 PROCEDURE — 85730 THROMBOPLASTIN TIME PARTIAL: CPT | Performed by: STUDENT IN AN ORGANIZED HEALTH CARE EDUCATION/TRAINING PROGRAM

## 2024-06-23 PROCEDURE — 80048 BASIC METABOLIC PNL TOTAL CA: CPT | Performed by: STUDENT IN AN ORGANIZED HEALTH CARE EDUCATION/TRAINING PROGRAM

## 2024-06-23 PROCEDURE — 25000003 PHARM REV CODE 250: Performed by: PHYSICIAN ASSISTANT

## 2024-06-23 PROCEDURE — 83735 ASSAY OF MAGNESIUM: CPT | Performed by: STUDENT IN AN ORGANIZED HEALTH CARE EDUCATION/TRAINING PROGRAM

## 2024-06-23 PROCEDURE — 85025 COMPLETE CBC W/AUTO DIFF WBC: CPT | Performed by: STUDENT IN AN ORGANIZED HEALTH CARE EDUCATION/TRAINING PROGRAM

## 2024-06-23 PROCEDURE — 82803 BLOOD GASES ANY COMBINATION: CPT

## 2024-06-23 RX ORDER — IBUPROFEN 200 MG
16 TABLET ORAL
Status: DISCONTINUED | OUTPATIENT
Start: 2024-06-23 | End: 2024-07-01 | Stop reason: HOSPADM

## 2024-06-23 RX ORDER — INSULIN ASPART 100 [IU]/ML
0-10 INJECTION, SOLUTION INTRAVENOUS; SUBCUTANEOUS
Status: DISCONTINUED | OUTPATIENT
Start: 2024-06-23 | End: 2024-07-01 | Stop reason: HOSPADM

## 2024-06-23 RX ORDER — IBUPROFEN 200 MG
24 TABLET ORAL
Status: DISCONTINUED | OUTPATIENT
Start: 2024-06-23 | End: 2024-07-01 | Stop reason: HOSPADM

## 2024-06-23 RX ORDER — GLUCAGON 1 MG
1 KIT INJECTION
Status: DISCONTINUED | OUTPATIENT
Start: 2024-06-23 | End: 2024-07-01 | Stop reason: HOSPADM

## 2024-06-23 RX ADMIN — MIDODRINE HYDROCHLORIDE 10 MG: 5 TABLET ORAL at 06:06

## 2024-06-23 RX ADMIN — ATORVASTATIN CALCIUM 40 MG: 40 TABLET, FILM COATED ORAL at 10:06

## 2024-06-23 RX ADMIN — QUETIAPINE FUMARATE 100 MG: 25 TABLET ORAL at 10:06

## 2024-06-23 RX ADMIN — SODIUM BICARBONATE 650 MG TABLET 650 MG: at 02:06

## 2024-06-23 RX ADMIN — VENLAFAXINE 37.5 MG: 37.5 TABLET ORAL at 09:06

## 2024-06-23 RX ADMIN — MIDODRINE HYDROCHLORIDE 10 MG: 5 TABLET ORAL at 02:06

## 2024-06-23 RX ADMIN — FOLIC ACID 1 MG: 1 TABLET ORAL at 09:06

## 2024-06-23 RX ADMIN — MIDODRINE HYDROCHLORIDE 10 MG: 5 TABLET ORAL at 10:06

## 2024-06-23 RX ADMIN — TRAZODONE HYDROCHLORIDE 25 MG: 50 TABLET ORAL at 10:06

## 2024-06-23 RX ADMIN — GABAPENTIN 100 MG: 100 CAPSULE ORAL at 10:06

## 2024-06-23 RX ADMIN — SODIUM BICARBONATE 650 MG TABLET 650 MG: at 10:06

## 2024-06-23 RX ADMIN — GABAPENTIN 100 MG: 100 CAPSULE ORAL at 09:06

## 2024-06-23 RX ADMIN — AMIODARONE HYDROCHLORIDE 400 MG: 200 TABLET ORAL at 09:06

## 2024-06-23 RX ADMIN — PANTOPRAZOLE SODIUM 40 MG: 40 TABLET, DELAYED RELEASE ORAL at 09:06

## 2024-06-23 RX ADMIN — AMOXICILLIN 500 MG: 400 POWDER, FOR SUSPENSION ORAL at 09:06

## 2024-06-23 RX ADMIN — WARFARIN SODIUM 2.5 MG: 2.5 TABLET ORAL at 04:06

## 2024-06-23 RX ADMIN — INSULIN ASPART 4 UNITS: 100 INJECTION, SOLUTION INTRAVENOUS; SUBCUTANEOUS at 11:06

## 2024-06-23 RX ADMIN — SODIUM BICARBONATE 650 MG TABLET 650 MG: at 09:06

## 2024-06-23 RX ADMIN — CHOLECALCIFEROL TAB 25 MCG (1000 UNIT) 1000 UNITS: 25 TAB at 09:06

## 2024-06-23 NOTE — SUBJECTIVE & OBJECTIVE
Interval History: No acute events overnight. Patient look and feels much better today and has been able to eat ~50% of the food brought to him he estimates since NGT came out yesterday.       Continuous Infusions:   0.9% NaCl   Intravenous Continuous   Stopped at 06/13/24 0117     Scheduled Meds:   0.9% NaCl   Intravenous Once    amiodarone  400 mg Oral Daily    amoxicillin  500 mg Per NG tube Daily    atorvastatin  40 mg Oral QHS    epoetin zohaib (PROCRIT) injection  4,000 Units Intravenous Every Mon, Wed, Fri    folic acid  1 mg Oral Daily    gabapentin  100 mg Oral BID    LIDOcaine  2 patch Transdermal Q24H    midodrine  10 mg Oral Q8H    pantoprazole  40 mg Oral Daily    QUEtiapine  100 mg Oral QHS    sodium bicarbonate  650 mg Oral TID    venlafaxine  37.5 mg Oral Daily    vitamin D  1,000 Units Oral Daily    warfarin  2.5 mg Oral Daily     PRN Meds:  Current Facility-Administered Medications:     0.9%  NaCl infusion (for blood administration), , Intravenous, Q24H PRN    0.9% NaCl, , Intravenous, PRN    acetaminophen, 650 mg, Oral, Q6H PRN    acetaminophen, 650 mg, Per NG tube, Q6H PRN    dextrose 10%, 12.5 g, Intravenous, PRN    dextrose 10%, 12.5 g, Intravenous, PRN    dextrose 10%, 25 g, Intravenous, PRN    dextrose 10%, 25 g, Intravenous, PRN    glucagon (human recombinant), 1 mg, Intramuscular, PRN    glucose, 16 g, Oral, PRN    glucose, 24 g, Oral, PRN    insulin aspart U-100, 0-10 Units, Subcutaneous, QID (AC + HS) PRN    simethicone, 1 tablet, Oral, TID PRN    traZODone, 25 mg, Oral, Nightly PRN    Review of patient's allergies indicates:  No Known Allergies  Objective:     Vital Signs (Most Recent):  Temp: 98.1 °F (36.7 °C) (06/23/24 0747)  Pulse: 85 (06/23/24 0747)  Resp: 18 (06/23/24 0747)  BP: (!) 72/0 (06/23/24 0815)  SpO2: (!) 94 % (06/23/24 0747) Vital Signs (24h Range):  Temp:  [97.8 °F (36.6 °C)-99 °F (37.2 °C)] 98.1 °F (36.7 °C)  Pulse:  [68-95] 85  Resp:  [18-19] 18  SpO2:  [94 %-99 %] 94  %  BP: (64-99)/(0-71) 72/0     Patient Vitals for the past 72 hrs (Last 3 readings):   Weight   06/23/24 0503 75.6 kg (166 lb 10.7 oz)   06/22/24 0627 76.4 kg (168 lb 6.9 oz)     Body mass index is 21.99 kg/m².      Intake/Output Summary (Last 24 hours) at 6/23/2024 1054  Last data filed at 6/23/2024 0503  Gross per 24 hour   Intake --   Output 0 ml   Net 0 ml         Telemetry: SR       Physical Exam  Constitutional:       Appearance: Normal appearance.   HENT:      Head: Normocephalic and atraumatic.   Eyes:      Conjunctiva/sclera: Conjunctivae normal.      Pupils: Pupils are equal, round, and reactive to light.   Neck:      Comments: No JVD   Cardiovascular:      Rate and Rhythm: Normal rate and regular rhythm.      Comments: Smooth VAD hum  Pulmonary:      Effort: Pulmonary effort is normal.      Breath sounds: Normal breath sounds.   Abdominal:      General: Bowel sounds are normal.      Palpations: Abdomen is soft.   Musculoskeletal:         General: No swelling. Normal range of motion.      Cervical back: Normal range of motion and neck supple.   Skin:     General: Skin is warm and dry.      Capillary Refill: Capillary refill takes 2 to 3 seconds.   Neurological:      General: No focal deficit present.      Mental Status: He is alert. Mental status is at baseline.      Comments: Oriented x3            Significant Labs:  CBC:  Recent Labs   Lab 06/21/24  0451 06/22/24  0232 06/23/24  0456   WBC 10.12 9.83 9.06   RBC 3.03* 2.92* 2.99*   HGB 8.4* 8.2* 8.3*   HCT 26.5* 24.6* 25.6*    346 399   MCV 88 84 86   MCH 27.7 28.1 27.8   MCHC 31.7* 33.3 32.4     BNP:  Recent Labs   Lab 06/17/24  0319 06/19/24  0312 06/21/24  0451   BNP 1,023* 823* 1,328*     CMP:  Recent Labs   Lab 06/18/24  1816 06/19/24  0312 06/20/24  0308 06/21/24  0451 06/22/24  0232 06/22/24  0808 06/23/24  0456   * 126*   < > 198* 102 218* 72   CALCIUM 8.8 8.3*   < > 8.7 8.7 8.7 9.1   ALBUMIN 2.4* 2.1*  --  2.1*  --  2.1*  --    PROT  7.8 7.2  --  7.7  --   --   --    * 131*   < > 134* 135* 134* 134*   K 4.1 3.7   < > 4.0 4.0 3.7 4.4   CO2 18* 18*   < > 16* 17* 24 26    102   < > 106 104 95 94*   BUN 17 32*   < > 54* 90* 43* 65*   CREATININE 1.8* 2.7*   < > 3.7* 4.8* 3.0* 5.0*   ALKPHOS 381* 369*  --  387*  --   --   --    * 159*  --  104*  --   --   --    * 110*  --  67*  --   --   --    BILITOT 0.8 0.5  --  0.4  --   --   --     < > = values in this interval not displayed.      Coagulation:   Recent Labs   Lab 06/21/24 0451 06/22/24  0232 06/23/24 0456   INR 2.5* 2.5* 2.0*   APTT 44.6* 47.3* 38.6*     LDH:  Recent Labs   Lab 06/21/24  0451 06/22/24  0232 06/23/24  0456   * 395* 368*     Microbiology:  Microbiology Results (last 7 days)       Procedure Component Value Units Date/Time    Culture, Respiratory with Gram Stain [[id number]]     Order Status: No result Specimen: Respiratory     Blood culture #1 **CANNOT BE ORDERED STAT** [[id number]] Collected: 06/11/24 2122    Order Status: Completed Specimen: Blood from Peripheral, Antecubital, Right Updated: 06/16/24 2212     Blood Culture, Routine No growth after 5 days.    Blood culture #2 **CANNOT BE ORDERED STAT** [[id number]] Collected: 06/11/24 2121    Order Status: Completed Specimen: Blood from Peripheral, Hand, Left Updated: 06/16/24 2212     Blood Culture, Routine No growth after 5 days.            I have reviewed all pertinent labs within the past 24 hours.    Estimated Creatinine Clearance: 16.4 mL/min (A) (based on SCr of 5 mg/dL (H)).    Diagnostic Results:  I have reviewed and interpreted all pertinent imaging results/findings within the past 24 hours.

## 2024-06-23 NOTE — PROGRESS NOTES
06/23/2024  Monet Aguiar    Current provider:  Sajan Hurley, *    Device interrogation:      6/23/2024    11:37 AM 6/23/2024     8:18 AM 6/23/2024     4:42 AM 6/22/2024    11:00 PM 6/22/2024     7:45 PM 6/22/2024     4:15 PM 6/22/2024    12:15 PM   TXP LVAD INTERROGATIONS   Type HeartMate3 HeartMate3 HeartMate3 HeartMate3 HeartMate3 HeartMate3 HeartMate3   Flow 4.6 4.2 4.5 4.2 4.2 4.6 4.7   Speed 5000 5000 5000 5000 5000 5000 5000   PI 3.1 3 3.3 3 3.1 2.5 2.6   Power ([place]) 3.5 3.5 3.5 3.4 3.7 3.4 3.4   LSL 4600 4600 4600 4600 4600 4600 4600   Pulsatility Intermittent pulse  Intermittent pulse Intermittent pulse Intermittent pulse  Intermittent pulse          Rounded on [facility] to ensure all mechanical assist device settings (IABP or VAD) were appropriate and all parameters were within limits.  I was able to ensure all back up equipment was present, the staff had no issues, and the Perfusion Department daily rounding was complete.      For implantable VADs: Interrogation of Ventricular assist device was performed with analysis of device parameters and review of device function. I have personally reviewed the interrogation findings and agree with findings as stated.     In emergency, the nursing units have been notified to contact the perfusion department either by:  Calling [phone] from 630am to 4pm Mon thru Fri, utilizing the On-Call Finder functionality of Epic and searching for Perfusion, or by contacting the hospital  from 4pm to 630am and on weekends and asking to speak with the perfusionist on call.    11:48 AM

## 2024-06-23 NOTE — PLAN OF CARE
Problem: Adult Inpatient Plan of Care  Goal: Plan of Care Review  Outcome: Progressing  Goal: Patient-Specific Goal (Individualized)  Outcome: Progressing  Goal: Absence of Hospital-Acquired Illness or Injury  Outcome: Progressing  Goal: Optimal Comfort and Wellbeing  Outcome: Progressing  Goal: Readiness for Transition of Care  Outcome: Progressing     Problem: Infection  Goal: Absence of Infection Signs and Symptoms  Outcome: Progressing     Problem: Diabetes Comorbidity  Goal: Blood Glucose Level Within Targeted Range  Outcome: Progressing     Problem: Acute Kidney Injury/Impairment  Goal: Fluid and Electrolyte Balance  Outcome: Progressing  Goal: Improved Oral Intake  Outcome: Progressing  Goal: Effective Renal Function  Outcome: Progressing     Problem: Pneumonia  Goal: Fluid Balance  Outcome: Progressing  Goal: Resolution of Infection Signs and Symptoms  Outcome: Progressing  Goal: Effective Oxygenation and Ventilation  Outcome: Progressing     Problem: Skin Injury Risk Increased  Goal: Skin Health and Integrity  Outcome: Progressing     Problem: CRRT (Continuous Renal Replacement Therapy)  Goal: Safe, Effective Therapy Delivery  Outcome: Progressing  Goal: Hemodynamic Stability  Outcome: Progressing  Goal: Body Temperature Maintained in Desired Range  Outcome: Progressing  Goal: Absence of Infection Signs and Symptoms  Outcome: Progressing     Problem: Ventricular Assist Device  Goal: Optimal Adjustment to Device  Outcome: Progressing  Goal: Absence of Bleeding  Outcome: Progressing  Goal: Absence of Embolism Signs and Symptoms  Outcome: Progressing  Goal: Optimal Blood Flow  Outcome: Progressing  Goal: Absence of Infection Signs and Symptoms  Outcome: Progressing  Goal: Effective Right-Sided Heart Function  Outcome: Progressing     Problem: Ventricular Assist Device  Goal: Optimal Adjustment to Device  Outcome: Progressing  Goal: Absence of Bleeding  Outcome: Progressing  Goal: Absence of Embolism Signs  and Symptoms  Outcome: Progressing  Goal: Optimal Blood Flow  Outcome: Progressing  Goal: Absence of Infection Signs and Symptoms  Outcome: Progressing  Goal: Effective Right-Sided Heart Function  Outcome: Progressing     Problem: Fall Injury Risk  Goal: Absence of Fall and Fall-Related Injury  Outcome: Progressing     Problem: Hemodialysis  Goal: Safe, Effective Therapy Delivery  Outcome: Progressing  Goal: Effective Tissue Perfusion  Outcome: Progressing  Goal: Absence of Infection Signs and Symptoms  Outcome: Progressing     Problem: Restraint, Nonviolent  Goal: Absence of Harm or Injury  Outcome: Progressing     Problem: Wound  Goal: Optimal Coping  Outcome: Progressing  Goal: Optimal Functional Ability  Outcome: Progressing  Goal: Absence of Infection Signs and Symptoms  Outcome: Progressing  Goal: Improved Oral Intake  Outcome: Progressing  Goal: Optimal Pain Control and Function  Outcome: Progressing  Goal: Skin Health and Integrity  Outcome: Progressing  Goal: Optimal Wound Healing  Outcome: Progressing

## 2024-06-23 NOTE — NURSING
Plan of care reviewed with patient/wife    Patient is AOX4, NAD.     Patient remained free of falls and trauma, fall precautions are in place.    Patient stand and pivot with assistance.     Patient denies reports of pain     Patient up in wheelchair and off the unit with wife. LAVD on battery, with backup batteries and control pack functioning propoerly     Patient/wife has no questions/concerns at this time/ verbalized understanding.       Telemetry monitor HR = 76 NS with LVAD artifact.      Pt remains free of falls, injury, and trauma.

## 2024-06-23 NOTE — ASSESSMENT & PLAN NOTE
-HeartMate 3 Implanted  12/1/2023  as DT  -Cont Coumadin, dosing by PharmD.  Goal INR 2.0-3.0. therapeutic today.   -Antiplatelets Not on ASA  -LDH is stable overall today. Will continue to monitor daily.  -Speed set at  5000     -Interrogation notable for no events  -Not listed for OHTx, declined for OHTX due to comorbidities     Procedure: Device Interrogation Including analysis of device parameters  Current Settings: Ventricular Assist Device  Review of device function is stable        6/23/2024     8:18 AM 6/23/2024     4:42 AM 6/22/2024    11:00 PM 6/22/2024     7:45 PM 6/22/2024     4:15 PM 6/22/2024    12:15 PM 6/22/2024     8:26 AM   TXP LVAD INTERROGATIONS   Type HeartMate3 HeartMate3 HeartMate3 HeartMate3 HeartMate3 HeartMate3 HeartMate3   Flow 4.2 4.5 4.2 4.2 4.6 4.7 4.6   Speed 5000 5000 5000 5000 5000 5000 5000   PI 5 3.3 3 3.1 2.5 2.6 2.7   Power ([place]) 3.5 3.5 3.4 3.7 3.4 3.4 3.5   LSL 4600 4600 4600 4600 4600 4600 4600   Pulsatility  Intermittent pulse Intermittent pulse Intermittent pulse  Intermittent pulse Intermittent pulse

## 2024-06-23 NOTE — NURSING
Pt/wife returned to the unit via wheelchair. Pt remains free of falls, trauma and injury. NAD. Scheduled due meds given and tolerated well. Patient sitting in recliner, wheels locked and call light within  patient reach.

## 2024-06-23 NOTE — CARE UPDATE
BG goal 140-180    -A1C   Lab Results   Component Value Date    HGBA1C 7.4 (H) 04/17/2024       -HOME REGIMEN: Amaryl 1 mg (recently prescribed by PCP about a week ago)        -INPATIENT REGIMEN: Novolog 5 units q 4 hrs with tube feeding and correction scale     -GLUCOSE TREND FOR THE PAST 24HRS:     -NO HYPOGYCEMIAS NOTED     -TOLERATING 25% OF PO DIET     -Diet: Diet Soft & Bite Sized (IDDSI Level 6) Fluid - 1500mL; Thin      -Steroids -  n/a     -Tube Feeds -  off and NG tube pulled on 6/22      Remains in CICU. BG variable on current SQ insulin regimen.Tube feeding now off. Will monitor on correction scale. If prandial excursions noted today, will consider scheduled Novolog with meals.  Creatinine 3.0.       Plan:  -Discontinue Novolog 5 units q 4 hrs while on tube feeds (tube feeds now off)   -Change to Moderate Dose Correction Scale   -BG monitoring ac/hs  -Hypoglycemia protocol in place    ** Please call Endocrine for any BG related issues **     Discharge planning:tbd    Endocrine to continue to follow    ** Please call Endocrine for any BG related issues **

## 2024-06-23 NOTE — PROGRESS NOTES
[facility] - Cardiology Stepdown  Heart Transplant  Progress Note    Patient Name: Radha Abbott  MRN: [id number]  Admission Date: 6/11/2024  Hospital Length of Stay: 12 days  Attending Physician: Sajan Hurley, *  Primary Care Provider: Vasu Kong MD  Principal Problem:Acute renal failure superimposed on stage 4 chronic kidney disease    Subjective:   Interval History: No acute events overnight. Patient look and feels much better today and has been able to eat ~50% of the food brought to him he estimates since NGT came out yesterday.       Continuous Infusions:   0.9% NaCl   Intravenous Continuous   Stopped at 06/13/24 0117     Scheduled Meds:   0.9% NaCl   Intravenous Once    amiodarone  400 mg Oral Daily    amoxicillin  500 mg Per NG tube Daily    atorvastatin  40 mg Oral QHS    epoetin zohaib (PROCRIT) injection  4,000 Units Intravenous Every Mon, Wed, Fri    folic acid  1 mg Oral Daily    gabapentin  100 mg Oral BID    LIDOcaine  2 patch Transdermal Q24H    midodrine  10 mg Oral Q8H    pantoprazole  40 mg Oral Daily    QUEtiapine  100 mg Oral QHS    sodium bicarbonate  650 mg Oral TID    venlafaxine  37.5 mg Oral Daily    vitamin D  1,000 Units Oral Daily    warfarin  2.5 mg Oral Daily     PRN Meds:  Current Facility-Administered Medications:     0.9%  NaCl infusion (for blood administration), , Intravenous, Q24H PRN    0.9% NaCl, , Intravenous, PRN    acetaminophen, 650 mg, Oral, Q6H PRN    acetaminophen, 650 mg, Per NG tube, Q6H PRN    dextrose 10%, 12.5 g, Intravenous, PRN    dextrose 10%, 12.5 g, Intravenous, PRN    dextrose 10%, 25 g, Intravenous, PRN    dextrose 10%, 25 g, Intravenous, PRN    glucagon (human recombinant), 1 mg, Intramuscular, PRN    glucose, 16 g, Oral, PRN    glucose, 24 g, Oral, PRN    insulin aspart U-100, 0-10 Units, Subcutaneous, QID (AC + HS) PRN    simethicone, 1 tablet, Oral, TID PRN    traZODone, 25 mg, Oral, Nightly PRN    Review of patient's allergies indicates:  No  Known Allergies  Objective:     Vital Signs (Most Recent):  Temp: 98.1 °F (36.7 °C) (06/23/24 0747)  Pulse: 85 (06/23/24 0747)  Resp: 18 (06/23/24 0747)  BP: (!) 72/0 (06/23/24 0815)  SpO2: (!) 94 % (06/23/24 0747) Vital Signs (24h Range):  Temp:  [97.8 °F (36.6 °C)-99 °F (37.2 °C)] 98.1 °F (36.7 °C)  Pulse:  [68-95] 85  Resp:  [18-19] 18  SpO2:  [94 %-99 %] 94 %  BP: (64-99)/(0-71) 72/0     Patient Vitals for the past 72 hrs (Last 3 readings):   Weight   06/23/24 0503 75.6 kg (166 lb 10.7 oz)   06/22/24 0627 76.4 kg (168 lb 6.9 oz)     Body mass index is 21.99 kg/m².      Intake/Output Summary (Last 24 hours) at 6/23/2024 1054  Last data filed at 6/23/2024 0503  Gross per 24 hour   Intake --   Output 0 ml   Net 0 ml         Telemetry: SR       Physical Exam  Constitutional:       Appearance: Normal appearance.   HENT:      Head: Normocephalic and atraumatic.   Eyes:      Conjunctiva/sclera: Conjunctivae normal.      Pupils: Pupils are equal, round, and reactive to light.   Neck:      Comments: No JVD   Cardiovascular:      Rate and Rhythm: Normal rate and regular rhythm.      Comments: Smooth VAD hum  Pulmonary:      Effort: Pulmonary effort is normal.      Breath sounds: Normal breath sounds.   Abdominal:      General: Bowel sounds are normal.      Palpations: Abdomen is soft.   Musculoskeletal:         General: No swelling. Normal range of motion.      Cervical back: Normal range of motion and neck supple.   Skin:     General: Skin is warm and dry.      Capillary Refill: Capillary refill takes 2 to 3 seconds.   Neurological:      General: No focal deficit present.      Mental Status: He is alert. Mental status is at baseline.      Comments: Oriented x3            Significant Labs:  CBC:  Recent Labs   Lab 06/21/24  0451 06/22/24  0232 06/23/24  0456   WBC 10.12 9.83 9.06   RBC 3.03* 2.92* 2.99*   HGB 8.4* 8.2* 8.3*   HCT 26.5* 24.6* 25.6*    346 399   MCV 88 84 86   MCH 27.7 28.1 27.8   MCHC 31.7* 33.3  32.4     BNP:  Recent Labs   Lab 06/17/24  0319 06/19/24 0312 06/21/24  0451   BNP 1,023* 823* 1,328*     CMP:  Recent Labs   Lab 06/18/24  1816 06/19/24 0312 06/20/24  0308 06/21/24  0451 06/22/24  0232 06/22/24  0808 06/23/24  0456   * 126*   < > 198* 102 218* 72   CALCIUM 8.8 8.3*   < > 8.7 8.7 8.7 9.1   ALBUMIN 2.4* 2.1*  --  2.1*  --  2.1*  --    PROT 7.8 7.2  --  7.7  --   --   --    * 131*   < > 134* 135* 134* 134*   K 4.1 3.7   < > 4.0 4.0 3.7 4.4   CO2 18* 18*   < > 16* 17* 24 26    102   < > 106 104 95 94*   BUN 17 32*   < > 54* 90* 43* 65*   CREATININE 1.8* 2.7*   < > 3.7* 4.8* 3.0* 5.0*   ALKPHOS 381* 369*  --  387*  --   --   --    * 159*  --  104*  --   --   --    * 110*  --  67*  --   --   --    BILITOT 0.8 0.5  --  0.4  --   --   --     < > = values in this interval not displayed.      Coagulation:   Recent Labs   Lab 06/21/24 0451 06/22/24 0232 06/23/24 0456   INR 2.5* 2.5* 2.0*   APTT 44.6* 47.3* 38.6*     LDH:  Recent Labs   Lab 06/21/24 0451 06/22/24 0232 06/23/24  0456   * 395* 368*     Microbiology:  Microbiology Results (last 7 days)       Procedure Component Value Units Date/Time    Culture, Respiratory with Gram Stain [[id number]]     Order Status: No result Specimen: Respiratory     Blood culture #1 **CANNOT BE ORDERED STAT** [[id number]] Collected: 06/11/24 2122    Order Status: Completed Specimen: Blood from Peripheral, Antecubital, Right Updated: 06/16/24 2212     Blood Culture, Routine No growth after 5 days.    Blood culture #2 **CANNOT BE ORDERED STAT** [[id number]] Collected: 06/11/24 2121    Order Status: Completed Specimen: Blood from Peripheral, Hand, Left Updated: 06/16/24 2212     Blood Culture, Routine No growth after 5 days.            I have reviewed all pertinent labs within the past 24 hours.    Estimated Creatinine Clearance: 16.4 mL/min (A) (based on SCr of 5 mg/dL (H)).    Diagnostic Results:  I have reviewed and interpreted  all pertinent imaging results/findings within the past 24 hours.  Assessment and Plan:     Mr. Radha Abbott is a 65 yo male with stage D HFrEF, ICMP who underwent HM3 placement as DT (12/1/23) w/ a hospital stay complicated by vasoplegia(requiring Giaprezza), debility, malnutrition/impaired swallowing, and renal failure requiring HD (since weaned off) w/ recent discharge for ADHF presented to the ED with wife due to c/o worsening generalized weakness since 6/10. Per wife patient was mostly sleeping during the day despite sleeping 10 hrs at night. Patient also admits decreased appetite and decreased urine output despite taking his medications as prescribed. He has not urinated since 6/11 at 2pm. He has chronic shortness of breath on exertion. Denies confusion, syncope, diarrhea, constipation, N/V, dysuria, or other complaints. Patient had his prior tunnel cath removed used for HD about 2 weeks ago. He completed a 6 week therapy for Enterococcus bacteremia with ampicillin and rocephin ending 5/30/24a and was switched to PO antibiotics.     On prior hospital stay nephrology consulted during hospital stay due to elevated renal function. They feel he is close to being a dialysis patient. He diuresed on IV Bumex with prn Metolazone. He is net negative 1.2L throughout his hospital stay and weight on day of discharge was 179lbs down from 186lbs on admit. We continued his home dose of Bumex 4mg BID but increased his prn Metolazone to 10mg. He received a dose of Epo while inpatient and he has plans to follow up with Heme/Onc to get outpatient therapy arranged.      2D Echo with CFD done on 3/26/2024  LVAD: There is a Heartmate III LVAD running at 5000 RPM. The aortic valve does not open. The ventricular septum is at midline. Inflow cannula well seated at the apex. Outflow graft not visualized.   Left Ventricle: The left ventricle is moderately dilated. Normal wall thickness. Global hypokinesis present. Septal motion is  abnormal. There is severely reduced systolic function with a visually estimated ejection fraction of 5 - 10%. There is diastolic dysfunction but grade cannot be determined.   Right Ventricle: Mild right ventricular enlargement. Wall thickness is normal. Right ventricle wall motion is normal. Systolic function is normal. Pacemaker lead present in the ventricle.   Left Atrium: Left atrium is severely dilated.   Right Atrium: Right atrium is moderately dilated.   Mitral Valve: The mitral valve is repaired with an Noble stitch. There is mild regurgitation.   IVC/SVC: Normal venous pressure at 3 mmHg.              * Acute renal failure superimposed on stage 4 chronic kidney disease  Mr. Radha Abbott is a 65 yo male with stage D HFrEF, ICMP who underwent HM3 placement as DT, DM2, CKD4, HTN, E.faecalis bacteremia who presented due to c/o worsening generalized weakness since 6/10. Admitted for worsening renal function with a BUN>100/Cr. 9.9.     - CT Ab/pel: Kidneys/ Ureters: Normal in size and location.  Mild bilateral perinephric stranding, a nonspecific finding and similar to prior.  No hydronephrosis or nephrolithiasis. No ureteral dilatation.  - Nephrology consulted, appreciate recs.   - HD per nephro   - Will need OP HD upon discharge, recommend CM/SW involvement to start workup.  - Will need IR evaluation once out of ICU for TDC placement.  - Renally dosing medications      Aspiration pneumonia of both lungs  -CT chest and CXR concerning for pneumonia   -tolerating room air   - de-escalate to Amoxicillin     Delirium  -Resolved   -CT head-toe neg.   -Continue Seroquel.       Bacteremia due to Enterococcus  He completed a 6 week therapy for Enterococcus bacteremia with ampicillin and rocephin ending 5/30/24a and was switched to Amoxicillin for suppression    Anticoagulant long-term use  Cont warfarin    Anemia  Chronic with no acute bleeding, multifactorial 2/2 CKD4 and chronic anticoagulation  -Iron panel done. Iron  deficient but Ferritin is elevated at 1186  -Homocysteine normal and methylmalonic pending   -s/p Epo   -Replaced IV iron   -Continue to trend CBC's    Adjustment disorder with depressed mood  Cont SSRI    LVAD (left ventricular assist device) present  -HeartMate 3 Implanted  12/1/2023  as DT  -Cont Coumadin, dosing by PharmD.  Goal INR 2.0-3.0. therapeutic today.   -Antiplatelets Not on ASA  -LDH is stable overall today. Will continue to monitor daily.  -Speed set at  5000     -Interrogation notable for no events  -Not listed for OHTx, declined for OHTX due to comorbidities     Procedure: Device Interrogation Including analysis of device parameters  Current Settings: Ventricular Assist Device  Review of device function is stable        6/23/2024     8:18 AM 6/23/2024     4:42 AM 6/22/2024    11:00 PM 6/22/2024     7:45 PM 6/22/2024     4:15 PM 6/22/2024    12:15 PM 6/22/2024     8:26 AM   TXP LVAD INTERROGATIONS   Type HeartMate3 HeartMate3 HeartMate3 HeartMate3 HeartMate3 HeartMate3 HeartMate3   Flow 4.2 4.5 4.2 4.2 4.6 4.7 4.6   Speed 5000 5000 5000 5000 5000 5000 5000   PI 5 3.3 3 3.1 2.5 2.6 2.7   Power ([place]) 3.5 3.5 3.4 3.7 3.4 3.4 3.5   LSL 4600 4600 4600 4600 4600 4600 4600   Pulsatility  Intermittent pulse Intermittent pulse Intermittent pulse  Intermittent pulse Intermittent pulse         PAF (paroxysmal atrial fibrillation)  -Continue amiodarone and Coumadin      Type 2 diabetes mellitus without complication, without long-term current use of insulin  -Endocrine    Acute on chronic combined systolic and diastolic heart failure  Owing to his advanced kidney disease and significantly elevated creat, he is not on a  ACEI/ARB/ARNI or MRA. He is also not on a beta blocker due to RV dysfunction.     Plan:   -Volume removal with HD   - See LVAD  - See IVÁN    CAD (coronary artery disease)  -Continue statin        Jimy An PA-C  Heart Transplant  [facility] - Cardiology Stepdown

## 2024-06-24 PROBLEM — R41.0 DELIRIUM: Status: RESOLVED | Noted: 2024-06-16 | Resolved: 2024-06-24

## 2024-06-24 LAB
ALBUMIN SERPL BCP-MCNC: 2 G/DL (ref 3.5–5.2)
ALLENS TEST: ABNORMAL
ALP SERPL-CCNC: 256 U/L (ref 55–135)
ALT SERPL W/O P-5'-P-CCNC: 70 U/L (ref 10–44)
ANION GAP SERPL CALC-SCNC: 16 MMOL/L (ref 8–16)
ANISOCYTOSIS BLD QL SMEAR: SLIGHT
APTT PPP: 40 SEC (ref 21–32)
AST SERPL-CCNC: 68 U/L (ref 10–40)
BASOPHILS # BLD AUTO: 0.03 K/UL (ref 0–0.2)
BASOPHILS NFR BLD: 0.4 % (ref 0–1.9)
BILIRUB DIRECT SERPL-MCNC: 0.3 MG/DL (ref 0.1–0.3)
BILIRUB SERPL-MCNC: 0.5 MG/DL (ref 0.1–1)
BNP SERPL-MCNC: 523 PG/ML (ref 0–99)
BUN SERPL-MCNC: 89 MG/DL (ref 8–23)
CALCIUM SERPL-MCNC: 9 MG/DL (ref 8.7–10.5)
CHLORIDE SERPL-SCNC: 93 MMOL/L (ref 95–110)
CO2 SERPL-SCNC: 27 MMOL/L (ref 23–29)
CREAT SERPL-MCNC: 6.1 MG/DL (ref 0.5–1.4)
CRP SERPL-MCNC: 139 MG/L (ref 0–8.2)
DELSYS: ABNORMAL
DIFFERENTIAL METHOD BLD: ABNORMAL
EOSINOPHIL # BLD AUTO: 0.2 K/UL (ref 0–0.5)
EOSINOPHIL NFR BLD: 2.8 % (ref 0–8)
ERYTHROCYTE [DISTWIDTH] IN BLOOD BY AUTOMATED COUNT: 18.5 % (ref 11.5–14.5)
EST. GFR  (NO RACE VARIABLE): 9.7 ML/MIN/1.73 M^2
GLUCOSE SERPL-MCNC: 146 MG/DL (ref 70–110)
GLUCOSE SERPL-MCNC: 89 MG/DL (ref 70–110)
HCO3 UR-SCNC: 31.6 MMOL/L (ref 24–28)
HCT VFR BLD AUTO: 23.3 % (ref 40–54)
HGB BLD-MCNC: 7.7 G/DL (ref 14–18)
HYPOCHROMIA BLD QL SMEAR: ABNORMAL
IMM GRANULOCYTES # BLD AUTO: 0.08 K/UL (ref 0–0.04)
IMM GRANULOCYTES NFR BLD AUTO: 1 % (ref 0–0.5)
INR PPP: 2.1 (ref 0.8–1.2)
LDH SERPL L TO P-CCNC: 334 U/L (ref 110–260)
LYMPHOCYTES # BLD AUTO: 0.9 K/UL (ref 1–4.8)
LYMPHOCYTES NFR BLD: 11.1 % (ref 18–48)
MAGNESIUM SERPL-MCNC: 2.5 MG/DL (ref 1.6–2.6)
MCH RBC QN AUTO: 28.4 PG (ref 27–31)
MCHC RBC AUTO-ENTMCNC: 33 G/DL (ref 32–36)
MCV RBC AUTO: 86 FL (ref 82–98)
MODE: ABNORMAL
MONOCYTES # BLD AUTO: 0.7 K/UL (ref 0.3–1)
MONOCYTES NFR BLD: 8.9 % (ref 4–15)
NEUTROPHILS # BLD AUTO: 5.9 K/UL (ref 1.8–7.7)
NEUTROPHILS NFR BLD: 75.8 % (ref 38–73)
NRBC BLD-RTO: 0 /100 WBC
OVALOCYTES BLD QL SMEAR: ABNORMAL
PCO2 BLDA: 51.9 MMHG (ref 35–45)
PH SMN: 7.39 [PH] (ref 7.35–7.45)
PHOSPHATE SERPL-MCNC: 6.6 MG/DL (ref 2.7–4.5)
PLATELET # BLD AUTO: 395 K/UL (ref 150–450)
PMV BLD AUTO: 10.7 FL (ref 9.2–12.9)
PO2 BLDA: 33 MMHG (ref 40–60)
POC BE: 7 MMOL/L
POC SATURATED O2: 61 % (ref 95–100)
POC TCO2: 33 MMOL/L (ref 24–29)
POCT GLUCOSE: 119 MG/DL (ref 70–110)
POCT GLUCOSE: 124 MG/DL (ref 70–110)
POCT GLUCOSE: 154 MG/DL (ref 70–110)
POIKILOCYTOSIS BLD QL SMEAR: SLIGHT
POLYCHROMASIA BLD QL SMEAR: ABNORMAL
POTASSIUM SERPL-SCNC: 4.4 MMOL/L (ref 3.5–5.1)
PREALB SERPL-MCNC: 16 MG/DL (ref 20–43)
PROT SERPL-MCNC: 7.2 G/DL (ref 6–8.4)
PROTHROMBIN TIME: 22.1 SEC (ref 9–12.5)
RBC # BLD AUTO: 2.71 M/UL (ref 4.6–6.2)
SAMPLE: ABNORMAL
SITE: ABNORMAL
SODIUM SERPL-SCNC: 136 MMOL/L (ref 136–145)
TIME NOTIFIED: 533
WBC # BLD AUTO: 7.78 K/UL (ref 3.9–12.7)

## 2024-06-24 PROCEDURE — 93750 INTERROGATION VAD IN PERSON: CPT | Mod: ,,, | Performed by: INTERNAL MEDICINE

## 2024-06-24 PROCEDURE — 97530 THERAPEUTIC ACTIVITIES: CPT

## 2024-06-24 PROCEDURE — 84100 ASSAY OF PHOSPHORUS: CPT | Performed by: STUDENT IN AN ORGANIZED HEALTH CARE EDUCATION/TRAINING PROGRAM

## 2024-06-24 PROCEDURE — 25000003 PHARM REV CODE 250: Performed by: REGISTERED NURSE

## 2024-06-24 PROCEDURE — 25000003 PHARM REV CODE 250: Performed by: STUDENT IN AN ORGANIZED HEALTH CARE EDUCATION/TRAINING PROGRAM

## 2024-06-24 PROCEDURE — 85610 PROTHROMBIN TIME: CPT | Performed by: STUDENT IN AN ORGANIZED HEALTH CARE EDUCATION/TRAINING PROGRAM

## 2024-06-24 PROCEDURE — 82803 BLOOD GASES ANY COMBINATION: CPT

## 2024-06-24 PROCEDURE — 25000003 PHARM REV CODE 250: Performed by: NURSE PRACTITIONER

## 2024-06-24 PROCEDURE — 27000248 HC VAD-ADDITIONAL DAY

## 2024-06-24 PROCEDURE — 99900035 HC TECH TIME PER 15 MIN (STAT)

## 2024-06-24 PROCEDURE — 83880 ASSAY OF NATRIURETIC PEPTIDE: CPT | Performed by: STUDENT IN AN ORGANIZED HEALTH CARE EDUCATION/TRAINING PROGRAM

## 2024-06-24 PROCEDURE — 25000003 PHARM REV CODE 250: Performed by: INTERNAL MEDICINE

## 2024-06-24 PROCEDURE — 20600001 HC STEP DOWN PRIVATE ROOM

## 2024-06-24 PROCEDURE — 83735 ASSAY OF MAGNESIUM: CPT | Performed by: STUDENT IN AN ORGANIZED HEALTH CARE EDUCATION/TRAINING PROGRAM

## 2024-06-24 PROCEDURE — 85730 THROMBOPLASTIN TIME PARTIAL: CPT | Performed by: STUDENT IN AN ORGANIZED HEALTH CARE EDUCATION/TRAINING PROGRAM

## 2024-06-24 PROCEDURE — 63600175 PHARM REV CODE 636 W HCPCS: Performed by: RADIOLOGY

## 2024-06-24 PROCEDURE — 97535 SELF CARE MNGMENT TRAINING: CPT

## 2024-06-24 PROCEDURE — 80048 BASIC METABOLIC PNL TOTAL CA: CPT | Performed by: STUDENT IN AN ORGANIZED HEALTH CARE EDUCATION/TRAINING PROGRAM

## 2024-06-24 PROCEDURE — 25000003 PHARM REV CODE 250: Performed by: PHYSICIAN ASSISTANT

## 2024-06-24 PROCEDURE — 97116 GAIT TRAINING THERAPY: CPT

## 2024-06-24 PROCEDURE — 86140 C-REACTIVE PROTEIN: CPT | Performed by: STUDENT IN AN ORGANIZED HEALTH CARE EDUCATION/TRAINING PROGRAM

## 2024-06-24 PROCEDURE — 84134 ASSAY OF PREALBUMIN: CPT | Performed by: STUDENT IN AN ORGANIZED HEALTH CARE EDUCATION/TRAINING PROGRAM

## 2024-06-24 PROCEDURE — 94761 N-INVAS EAR/PLS OXIMETRY MLT: CPT | Mod: XB

## 2024-06-24 PROCEDURE — 83615 LACTATE (LD) (LDH) ENZYME: CPT | Performed by: STUDENT IN AN ORGANIZED HEALTH CARE EDUCATION/TRAINING PROGRAM

## 2024-06-24 PROCEDURE — 80076 HEPATIC FUNCTION PANEL: CPT | Performed by: STUDENT IN AN ORGANIZED HEALTH CARE EDUCATION/TRAINING PROGRAM

## 2024-06-24 PROCEDURE — 85025 COMPLETE CBC W/AUTO DIFF WBC: CPT | Performed by: STUDENT IN AN ORGANIZED HEALTH CARE EDUCATION/TRAINING PROGRAM

## 2024-06-24 PROCEDURE — 99233 SBSQ HOSP IP/OBS HIGH 50: CPT | Mod: ,,, | Performed by: INTERNAL MEDICINE

## 2024-06-24 PROCEDURE — 99232 SBSQ HOSP IP/OBS MODERATE 35: CPT | Mod: ,,, | Performed by: HOSPITALIST

## 2024-06-24 RX ORDER — HEPARIN SODIUM 1000 [USP'U]/ML
INJECTION, SOLUTION INTRAVENOUS; SUBCUTANEOUS
Status: COMPLETED | OUTPATIENT
Start: 2024-06-24 | End: 2024-06-24

## 2024-06-24 RX ADMIN — GABAPENTIN 100 MG: 100 CAPSULE ORAL at 09:06

## 2024-06-24 RX ADMIN — TRAZODONE HYDROCHLORIDE 25 MG: 50 TABLET ORAL at 09:06

## 2024-06-24 RX ADMIN — FOLIC ACID 1 MG: 1 TABLET ORAL at 08:06

## 2024-06-24 RX ADMIN — HEPARIN SODIUM 1000 UNITS: 1000 INJECTION, SOLUTION INTRAVENOUS; SUBCUTANEOUS at 02:06

## 2024-06-24 RX ADMIN — VENLAFAXINE 37.5 MG: 37.5 TABLET ORAL at 08:06

## 2024-06-24 RX ADMIN — WARFARIN SODIUM 2.5 MG: 2.5 TABLET ORAL at 05:06

## 2024-06-24 RX ADMIN — SODIUM BICARBONATE 650 MG TABLET 650 MG: at 09:06

## 2024-06-24 RX ADMIN — CHOLECALCIFEROL TAB 25 MCG (1000 UNIT) 1000 UNITS: 25 TAB at 08:06

## 2024-06-24 RX ADMIN — PANTOPRAZOLE SODIUM 40 MG: 40 TABLET, DELAYED RELEASE ORAL at 08:06

## 2024-06-24 RX ADMIN — ATORVASTATIN CALCIUM 40 MG: 40 TABLET, FILM COATED ORAL at 09:06

## 2024-06-24 RX ADMIN — AMOXICILLIN 500 MG: 400 POWDER, FOR SUSPENSION ORAL at 09:06

## 2024-06-24 RX ADMIN — SODIUM BICARBONATE 650 MG TABLET 650 MG: at 08:06

## 2024-06-24 RX ADMIN — QUETIAPINE FUMARATE 100 MG: 25 TABLET ORAL at 09:06

## 2024-06-24 RX ADMIN — SODIUM BICARBONATE 650 MG TABLET 650 MG: at 03:06

## 2024-06-24 RX ADMIN — GABAPENTIN 100 MG: 100 CAPSULE ORAL at 08:06

## 2024-06-24 RX ADMIN — MIDODRINE HYDROCHLORIDE 10 MG: 5 TABLET ORAL at 09:06

## 2024-06-24 RX ADMIN — AMIODARONE HYDROCHLORIDE 400 MG: 200 TABLET ORAL at 08:06

## 2024-06-24 NOTE — PT/OT/SLP PROGRESS
Occupational Therapy   Co-Treatment    Name: Radha Abbott  MRN: [id number]  Admitting Diagnosis:  Acute renal failure superimposed on stage 4 chronic kidney disease       Recommendations:     Discharge Recommendations: High Intensity Therapy  Discharge Equipment Recommendations:  walker, rolling  Barriers to discharge:  None    Assessment:     Radha Abbott is a 64 y.o. male with a medical diagnosis of Acute renal failure superimposed on stage 4 chronic kidney disease.  Pt presents with decreased endurance and impaired mobility performance as limited by cardiovascular status and generalized weakness. Pt session focused on functional mobiltiy in room to simulate demonstration of functional occupational task. Pt remains limited 2/2 generalized weakness and decreased endurance during gait. Pt needed RW and had increased A ascending from low level surfaces today.  Pt on LVAD wall power throughout with spouse present. At this time, pt is a high fall risk and not at their baseline. Prior to hospitalizations, pt was mod I for ADLs/mobility. Patient has demonstrated sufficient progression to warrant high intensity therapy evidenced by objectives noted below.   Performance deficits affecting function are weakness, gait instability, impaired endurance, impaired balance, impaired self care skills, impaired functional mobility, decreased upper extremity function, decreased lower extremity function.     Rehab Prognosis:  Good; patient would benefit from acute skilled OT services to address these deficits and reach maximum level of function.       Plan:     Patient to be seen 4 x/week to address the above listed problems via self-care/home management, therapeutic activities, therapeutic exercises, neuromuscular re-education  Plan of Care Expires: 07/14/24  Plan of Care Reviewed with: patient, spouse    Subjective     Chief Complaint: feeling weak   Patient/Family Comments/goals: to get stronger   Pain/Comfort:  Pain Rating 1:  0/10  Pain Rating Post-Intervention 1: 0/10    Objective:     Communicated with: RN prior to session.  Patient found HOB elevated with central line, telemetry, LVAD upon OT entry to room.    General Precautions: Standard, fall, LVAD    Orthopedic Precautions:N/A  Braces: N/A  Respiratory Status: Room air     Occupational Performance:     Bed Mobility:    Patient completed Rolling/Turning to Left with  minimum assistance  Patient completed Scooting/Bridging with contact guard assistance  Patient completed Supine to Sit with minimum assistance     Functional Mobility/Transfers:  Patient completed Sit <> Stand Transfer with minimum assistance and of 2 persons  with  rolling walker   Patient completed Bed <> Chair Transfer using Step Transfer technique with moderate assistance with rolling walker  Functional Mobility: Pt stood from bed with min A x2 using RW, tolerating steps to bedside chair with pivot with mod A. Pt then stood from chair (chair built up with several linens) with mod A using RW and ambulated ~10 ft with associated pivot taking steps back to chair once at sink. Pt using RW with mod A    Activities of Daily Living:  Grooming: politely declined .  Upper Body Dressing: minimum assistance nancy prather      [facility] 6 Click ADL: 12    Treatment & Education:  Pt educated on role of occupational therapy, POC, and safety during ADLs and functional mobility. Pt and OT discussed importance of safe, continued mobility to optimize daily living skills. Pt verbalized understanding.     White board updated during session. Pt given instruction to call for medical staff/nurse for assistance.       Patient left up in chair with all lines intact, call button in reach, RN notified, and MD team member and spouse present    GOALS:   Multidisciplinary Problems       Occupational Therapy Goals          Problem: Occupational Therapy    Goal Priority Disciplines Outcome Interventions   Occupational Therapy Goal     OT, PT/OT  Progressing    Description: Goals to be met by: 7/14/24     Patient will increase functional independence with ADLs by performing:    UE Dressing with Set-up Assistance.  LE Dressing with Minimal Assistance.  Grooming while standing at sink with Stand-by Assistance.  Toileting from toilet/bedside commode with Minimal Assistance for hygiene and clothing management.   Supine to sit with Stand-by Assistance.  Step transfer with Contact Guard Assistance  Toilet transfer to toilet/bedside commode with Contact Guard Assistance.                         Time Tracking:     OT Date of Treatment: 06/24/24  OT Start Time: 0812  OT Stop Time: 0835  OT Total Time (min): 23 min    Billable Minutes:Self Care/Home Management 13 min  Therapeutic Activity 10 min    OT/PRIETO: OT          6/24/2024

## 2024-06-24 NOTE — PROGRESS NOTES
"[facility] - Cardiology Stepdown  Adult Nutrition  Progress Note    SUMMARY       Recommendations  1. Continue Soft and bite sized diet  2. Continue Novasource Renal   3. RD following    Goals: Meet % of EEN/EPN by RD f/u date  Nutrition Goal Status: goal not met  Communication of RD Recs: POC    Assessment and Plan  Nutrition Problem  Increased protein needs      Related to (etiology):   Physiological demands      Signs and Symptoms (as evidenced by):   LVAD present      Interventions (treatment strategy):  Collaboration of nutrition care w/ other providers      Nutrition Diagnosis Status:   Continues        Reason for Assessment    Reason For Assessment: RD follow-up  Diagnosis: renal disease  Relevant Medical History: DM, CHF  Interdisciplinary Rounds: did not attend  General Information Comments: Pt consuming on 25% meal consumption.  Nutrition Discharge Planning: pending clinical course    Nutrition Risk Screen    Nutrition Risk Screen: difficulty chewing/swallowing    Nutrition/Diet History    Spiritual, Cultural Beliefs, Religion Practices, Values that Affect Care: no  Food Allergies: NKFA    Anthropometrics    Temp: 97.8 °F (36.6 °C)  Height Method: Stated  Height: 6' 1" (185.4 cm)  Height (inches): 73 in  Weight Method: Standard Scale  Weight: 72.8 kg (160 lb 7.9 oz)  Weight (lb): 160.5 lb  Ideal Body Weight (IBW), Male: 184 lb  % Ideal Body Weight, Male (lb): 95.65 %  BMI (Calculated): 21.2  BMI Grade: 18.5-24.9 - normal       Lab/Procedures/Meds    Pertinent Labs Reviewed: reviewed  Pertinent Labs Comments: Sodium 128, Potassium 3.4, BUN 25, Cr 2.5, Glucose 251  Pertinent Medications Reviewed: reviewed  Pertinent Medications Comments: coumadin    Estimated/Assessed Needs    Weight Used For Calorie Calculations: 78.6 kg (173 lb 4.5 oz)  Energy Calorie Requirements (kcal): 1966  Energy Need Method: [place]-St Jeor (PAL 1.2)  Protein Requirements:  g (1.2-1.5 g/kg)  Weight Used For Protein " Calculations: 78.6 kg (173 lb 4.5 oz)        RDA Method (mL): 1966         Nutrition Prescription Ordered    Current Diet Order: Renal, coumadin diet    Evaluation of Received Nutrient/Fluid Intake    I/O: -  Energy Calories Required: not meeting needs  Protein Required: not meeting needs  Fluid Required: not meeting needs  Total Fluid Intake (mL/kg): 1 ml or fluid per MD  Tolerance: tolerating  % Intake of Estimated Energy Needs: 0 - 25 %  % Meal Intake: 0 - 25 %    Nutrition Risk    Level of Risk/Frequency of Follow-up: low ((1-2x/week))     Monitor and Evaluation    Food and Nutrient Intake: energy intake, food and beverage intake  Food and Nutrient Adminstration: diet order  Knowledge/Beliefs/Attitudes: food and nutrition knowledge/skill, beliefs and attitudes  Physical Activity and Function: nutrition-related ADLs and IADLs, factors affecting access to physical activity  Anthropometric Measurements: height/length, weight, body mass index, weight change, growth pattern indices/percentile ranks  Biochemical Data, Medical Tests and Procedures: electrolyte and renal panel, gastrointestinal profile, glucose/endocrine profile, inflammatory profile, lipid profile  Nutrition-Focused Physical Findings: overall appearance, extremities, muscles and bones, head and eyes, skin     Nutrition Follow-Up    RD Follow-up?: Yes

## 2024-06-24 NOTE — PROGRESS NOTES
06/24/2024  [place]    Current provider:  Sajan Hurley, *    Device interrogation:      6/24/2024     8:00 AM 6/24/2024    12:00 AM 6/23/2024    10:53 PM 6/23/2024     7:37 PM 6/23/2024    11:37 AM 6/23/2024     8:18 AM 6/23/2024     4:42 AM   TXP LVAD INTERROGATIONS   Type HeartMate3 HeartMate3 HeartMate3 HeartMate3 HeartMate3 HeartMate3 HeartMate3   Flow 4.1  4.3 3.8 4.6 4.2 4.5   Speed 5000  5000 5100 5000 5000 5000   PI 4.4  3.8 3.8 3.1 3 3.3   Power ([place]) 3.4  3.4 3.4 3.5 3.5 3.5   LSL 4600  4600 4600 4600 4600 4600   Pulsatility Intermittent pulse  Intermittent pulse Intermittent pulse Intermittent pulse  Intermittent pulse          Rounded on [facility] to ensure all mechanical assist device settings (IABP or VAD) were appropriate and all parameters were within limits.  I was able to ensure all back up equipment was present, the staff had no issues, and the Perfusion Department daily rounding was complete.      For implantable VADs: Interrogation of Ventricular assist device was performed with analysis of device parameters and review of device function. I have personally reviewed the interrogation findings and agree with findings as stated.     In emergency, the nursing units have been notified to contact the perfusion department either by:  Calling [phone] from 630am to 4pm Mon thru Fri, utilizing the On-Call Finder functionality of Epic and searching for Perfusion, or by contacting the hospital  from 4pm to 630am and on weekends and asking to speak with the perfusionist on call.    12:57 PM

## 2024-06-24 NOTE — PT/OT/SLP PROGRESS
Physical Therapy Treatment    Patient Name:  Radha Abbott   MRN:  [id number]  Admit Date: 6/11/2024  Admitting Diagnosis:  Acute renal failure superimposed on stage 4 chronic kidney disease   Length of Stay: 13 days  Recent Surgery: * No surgery found *      Recommendations:     Discharge Recommendations:  High Intensity Therapy   Discharge Equipment Recommendations: walker, rolling   Barriers to discharge: Decreased caregiver support and increased level of assistance    Plan:     During this hospitalization, patient to be seen 4 x/week to address the listed problems via gait training, therapeutic activities, therapeutic exercises, neuromuscular re-education  Plan of Care Expires:  07/13/24  Plan of Care Reviewed with: patient, spouse    Assessment:     Radha Abbott is a 64 y.o. male admitted with a medical diagnosis of Acute renal failure superimposed on stage 4 chronic kidney disease. Pt is s/p LVAD placement 12/2023. Pt's drive line found anchored to R flank with tape as wife reports anchor irritates his skin. Pt found alert, cooperative, and motivated to participate in today's session. Pt with improved performance of STS at this visit. Pt able to initiate forward steps to chair and successfully complete 10' ambulation trial to sink. Next visit plan to improve gait quality and functional LE strength. Pt is a good candidate for PT and would continue to benefit from skilled interventions to improve functional strength, endurance, and progress toward PLOF.     Problem List: weakness, impaired endurance, impaired functional mobility, gait instability, decreased lower extremity function.  Rehab Prognosis: Good     GOALS:   Multidisciplinary Problems       Physical Therapy Goals          Problem: Physical Therapy    Goal Priority Disciplines Outcome Goal Variances Interventions   Physical Therapy Goal     PT, PT/OT Progressing     Description: Goals to be completed by: 7/14/24    1.Pt will perform sup<>sit transfers w/  "contact guard assistance  2.Pt will have sufficient dynamic balance to sit EOB while performing ADLs/therex w/ supervision  3.Pt will be able to stand up from EOB w/ contact guard assistance using LRAD  4.Pt will ambulate 100 feet w/ minimum assistance using LRAD  5.Pt will be independent w/ HEP therex on BLE w/ good form and ROM                        Subjective   Communicated with RN prior to session.  Patient found HOB elevated upon PT entry to room, agreeable to evaluation. Radha Abbott's wife present during session.    Patient/Family Comments/goals: to get better  Pain/Comfort:  Pain Rating 1: 0/10  Pain Rating Post-Intervention 1: 0/10    Objective:   Patient found with: central line, telemetry, LVAD   General Precautions: Standard, Cardiac fall, LVAD   Orthopedic Precautions:N/A   Braces: N/A   Oxygen Device: Room Air  Vitals: BP (!) 72/0 (BP Location: Right arm, Patient Position: Lying)   Pulse 77   Temp 98.2 °F (36.8 °C) (Oral)   Resp 18   Ht 6' 1" (1.854 m)   Wt 72.8 kg (160 lb 7.9 oz)   SpO2 96%   BMI 21.17 kg/m²     Outcome Measures:  AM-PAC 6 CLICK MOBILITY  Turning over in bed (including adjusting bedclothes, sheets and blankets)?: 3  Sitting down on and standing up from a chair with arms (e.g., wheelchair, bedside commode, etc.): 3  Moving from lying on back to sitting on the side of the bed?: 3  Moving to and from a bed to a chair (including a wheelchair)?: 3  Need to walk in hospital room?: 2  Climbing 3-5 steps with a railing?: 1  Basic Mobility Total Score: 15     Functional Mobility:  Additional staff present: OT and Student PT  Bed Mobility:   Supine to Sit: minimum assistance; HOB elevated  Scooting anteriorly to EOB to have both feet planted on floor: contact guard assistance    Sitting Balance at Edge of Bed:  Assistance Level Required: Stand-by Assistance  Time: 8 mins  Postural deviations noted: forward head  Comments:   Verbal cues to maintain upright posture    Transfers:   Sit <> " Stand Transfer: with rolling walker   1 trial from raised EOB: Minx2 with RW  pt required verbal cues for hand placement on RW  Pt required assist with hip extension  1 trial from raised chair: Mod x2 with RW  Pt required verbal cues for hand placement on RW  Pt required verbal and tactile cues to maintain upright posture  Pt required assist with hip extension      Gait:  Patient ambulated: 1' to chair + 10' forward (seated rest between trials)   Patient required: moderate assist  Patient used: rolling walker  Gait Pattern observed: reciprocal gait  Gait Deviation(s): unsteady gait, decreased step length, flexed posture, and decreased gina  Impairments due to: decreased strength, decreased endurance, and impaired postural control  Comments:   Pt required verbal cues to maintain upright posture  Facilitation of hip and trunk extension  Verbal cues for walker management  Verbal cues for step sequencing    Therapeutic Activities, Exercises, and Education:   Education:  Pt educated on role of PT and POC  Pt educated on importance of OOB activity and daily ambulation  Pt verbalized understanding    Pt remained on wallpower throughout session  Pt drive line anchored to R flank with tape (wife states anchor irritates pt's skin)      Patient left up in chair with all lines intact, call button in reach, and RN, MD, wife present..    Time Tracking:     PT Received On: 06/24/24  PT Start Time: 0813     PT Stop Time: 0836  PT Total Time (min): 23 min       Billable Minutes:   Gait Training 10 and Therapeutic Activity 13    Treatment Type: Treatment  PT/PTA: PT

## 2024-06-24 NOTE — NURSING TRANSFER
Nursing Transfer Note      6/24/2024   1252 PM      Reason patient is being transferred: tunneled cath placement    Transfer To: IR unit    Transfer via stretcher    Transfer with cardiac monitoring    Transported by transportation team    Telemetry: Box Number [id number]  Order for Tele Monitor? Yes    Patient belongings transferred with patient: Yes  LVAD emergency bag    Notified: spouse

## 2024-06-24 NOTE — NURSING
Called [phone], and talked to pharmacist epoetin is due at 0900, and there is no time for dialysis scheduled yet, and pharmacist said the med is supposed to give during dialysis, hold it for now.

## 2024-06-24 NOTE — PROGRESS NOTES
[facility] - Cardiology Stepdown  Heart Transplant  Progress Note    Patient Name: Radha Abbott  MRN: [id number]  Admission Date: 6/11/2024  Hospital Length of Stay: 13 days  Attending Physician: Sajan Hurley, *  Primary Care Provider: Vasu Kong MD  Principal Problem:Acute renal failure superimposed on stage 4 chronic kidney disease    Subjective:   Interval History:   -NPO for tunneled line this morning    Subjective:  -doing well, no questions for team    Continuous Infusions:  Scheduled Meds:   0.9% NaCl   Intravenous Once    amiodarone  400 mg Oral Daily    amoxicillin  500 mg Per NG tube Daily    atorvastatin  40 mg Oral QHS    [START ON 6/25/2024] epoetin zohaib (PROCRIT) injection  10,000 Units Intravenous Every Mon, Wed, Fri    folic acid  1 mg Oral Daily    gabapentin  100 mg Oral BID    LIDOcaine  2 patch Transdermal Q24H    midodrine  10 mg Oral Q8H    pantoprazole  40 mg Oral Daily    QUEtiapine  100 mg Oral QHS    sodium bicarbonate  650 mg Oral TID    venlafaxine  37.5 mg Oral Daily    vitamin D  1,000 Units Oral Daily    warfarin  2.5 mg Oral Daily     PRN Meds:  Current Facility-Administered Medications:     0.9%  NaCl infusion (for blood administration), , Intravenous, Q24H PRN    0.9% NaCl, , Intravenous, PRN    acetaminophen, 650 mg, Oral, Q6H PRN    acetaminophen, 650 mg, Per NG tube, Q6H PRN    dextrose 10%, 12.5 g, Intravenous, PRN    dextrose 10%, 12.5 g, Intravenous, PRN    dextrose 10%, 25 g, Intravenous, PRN    dextrose 10%, 25 g, Intravenous, PRN    glucagon (human recombinant), 1 mg, Intramuscular, PRN    glucose, 16 g, Oral, PRN    glucose, 24 g, Oral, PRN    insulin aspart U-100, 0-10 Units, Subcutaneous, QID (AC + HS) PRN    simethicone, 1 tablet, Oral, TID PRN    traZODone, 25 mg, Oral, Nightly PRN    Review of patient's allergies indicates:  No Known Allergies  Objective:     Vital Signs (Most Recent):  Temp: 97.8 °F (36.6 °C) (06/24/24 1530)  Pulse: 79 (06/24/24  1530)  Resp: 18 (06/24/24 1530)  BP: 105/82 (06/24/24 1531)  SpO2: 99 % (06/24/24 1530) Vital Signs (24h Range):  Temp:  [97.5 °F (36.4 °C)-98.2 °F (36.8 °C)] 97.8 °F (36.6 °C)  Pulse:  [75-86] 79  Resp:  [15-18] 18  SpO2:  [93 %-100 %] 99 %  BP: ()/(0-82) 105/82     Patient Vitals for the past 72 hrs (Last 3 readings):   Weight   06/24/24 0800 72.8 kg (160 lb 7.9 oz)   06/24/24 0445 76 kg (167 lb 8.8 oz)   06/23/24 0503 75.6 kg (166 lb 10.7 oz)     Body mass index is 21.17 kg/m².    No intake or output data in the 24 hours ending 06/24/24 1549    Hemodynamic Parameters:  CVP:  [2 mmHg] 2 mmHg    Telemetry: sinus rhythm       Physical Exam  Constitutional:       Appearance: Normal appearance.   Cardiovascular:      Comments: VAD hum; no elevated JVP  Pulmonary:      Effort: Pulmonary effort is normal.      Breath sounds: Normal breath sounds.   Musculoskeletal:      Right lower leg: No edema.      Left lower leg: No edema.   Skin:     General: Skin is warm and dry.   Neurological:      Mental Status: He is alert.            Significant Labs:  CBC:  Recent Labs   Lab 06/22/24  0232 06/23/24  0456 06/24/24  0533   WBC 9.83 9.06 7.78   RBC 2.92* 2.99* 2.71*   HGB 8.2* 8.3* 7.7*   HCT 24.6* 25.6* 23.3*    399 395   MCV 84 86 86   MCH 28.1 27.8 28.4   MCHC 33.3 32.4 33.0     BNP:  Recent Labs   Lab 06/19/24  0312 06/21/24  0451 06/24/24  0533   * 1,328* 523*     CMP:  Recent Labs   Lab 06/19/24  0312 06/20/24  0308 06/21/24  0451 06/22/24  0232 06/22/24  0808 06/23/24  0456 06/24/24  0533   *   < > 198*   < > 218* 72 89   CALCIUM 8.3*   < > 8.7   < > 8.7 9.1 9.0   ALBUMIN 2.1*  --  2.1*  --  2.1*  --  2.0*   PROT 7.2  --  7.7  --   --   --  7.2   *   < > 134*   < > 134* 134* 136   K 3.7   < > 4.0   < > 3.7 4.4 4.4   CO2 18*   < > 16*   < > 24 26 27      < > 106   < > 95 94* 93*   BUN 32*   < > 54*   < > 43* 65* 89*   CREATININE 2.7*   < > 3.7*   < > 3.0* 5.0* 6.1*   ALKPHOS 369*  --   387*  --   --   --  256*   *  --  104*  --   --   --  70*   *  --  67*  --   --   --  68*   BILITOT 0.5  --  0.4  --   --   --  0.5    < > = values in this interval not displayed.      Coagulation:   Recent Labs   Lab 06/22/24  0232 06/23/24  0456 06/24/24  0533   INR 2.5* 2.0* 2.1*   APTT 47.3* 38.6* 40.0*     LDH:  Recent Labs   Lab 06/22/24  0232 06/23/24  0456 06/24/24  0533   * 368* 334*     Microbiology:  Microbiology Results (last 7 days)       ** No results found for the last 168 hours. **            I have reviewed all pertinent labs within the past 24 hours.    Estimated Creatinine Clearance: 12.9 mL/min (A) (based on SCr of 6.1 mg/dL (H)).    Diagnostic Results:  I have reviewed and interpreted all pertinent imaging results/findings within the past 24 hours.  Assessment and Plan:     Mr. Radha Abbott is a 63 yo male with stage D HFrEF, ICMP who underwent HM3 placement as DT (12/1/23) w/ a hospital stay complicated by vasoplegia(requiring Giaprezza), debility, malnutrition/impaired swallowing, and renal failure requiring HD (since weaned off) w/ recent discharge for ADHF presented to the ED with wife due to c/o worsening generalized weakness since 6/10. Per wife patient was mostly sleeping during the day despite sleeping 10 hrs at night. Patient also admits decreased appetite and decreased urine output despite taking his medications as prescribed. He has not urinated since 6/11 at 2pm. He has chronic shortness of breath on exertion. Denies confusion, syncope, diarrhea, constipation, N/V, dysuria, or other complaints. Patient had his prior tunnel cath removed used for HD about 2 weeks ago. He completed a 6 week therapy for Enterococcus bacteremia with ampicillin and rocephin ending 5/30/24a and was switched to PO antibiotics.     On prior hospital stay nephrology consulted during hospital stay due to elevated renal function. They feel he is close to being a dialysis patient. He diuresed  on IV Bumex with prn Metolazone. He is net negative 1.2L throughout his hospital stay and weight on day of discharge was 179lbs down from 186lbs on admit. We continued his home dose of Bumex 4mg BID but increased his prn Metolazone to 10mg. He received a dose of Epo while inpatient and he has plans to follow up with Heme/Onc to get outpatient therapy arranged.      2D Echo with CFD done on 3/26/2024  LVAD: There is a Heartmate III LVAD running at 5000 RPM. The aortic valve does not open. The ventricular septum is at midline. Inflow cannula well seated at the apex. Outflow graft not visualized.   Left Ventricle: The left ventricle is moderately dilated. Normal wall thickness. Global hypokinesis present. Septal motion is abnormal. There is severely reduced systolic function with a visually estimated ejection fraction of 5 - 10%. There is diastolic dysfunction but grade cannot be determined.   Right Ventricle: Mild right ventricular enlargement. Wall thickness is normal. Right ventricle wall motion is normal. Systolic function is normal. Pacemaker lead present in the ventricle.   Left Atrium: Left atrium is severely dilated.   Right Atrium: Right atrium is moderately dilated.   Mitral Valve: The mitral valve is repaired with an Noble stitch. There is mild regurgitation.   IVC/SVC: Normal venous pressure at 3 mmHg.              * Acute renal failure superimposed on stage 4 chronic kidney disease  Mr. Radha Abbott is a 65 yo male with stage D HFrEF, ICMP who underwent HM3 placement as DT, DM2, CKD4, HTN, E.faecalis bacteremia who presented due to c/o worsening generalized weakness since 6/10. Admitted for worsening renal function with a BUN>100/Cr. 9.9.     - CT Ab/pel: Kidneys/ Ureters: Normal in size and location.  Mild bilateral perinephric stranding, a nonspecific finding and similar to prior.  No hydronephrosis or nephrolithiasis. No ureteral dilatation.  - tunneled line placed 6/24 for HD   - HD per nephro   -  Will need OP HD upon discharge, recommend CM/SW involvement to start workup.  - Renally dosing medications      Aspiration pneumonia of both lungs  -CT chest and CXR concerning for pneumonia   -tolerating room air   - de-escalate to Amoxicillin     Bacteremia due to Enterococcus  He completed a 6 week therapy for Enterococcus bacteremia with ampicillin and rocephin ending 5/30/24a and was switched to Amoxicillin for suppression    Anticoagulant long-term use  Cont warfarin    Anemia  Chronic with no acute bleeding, multifactorial 2/2 CKD4 and chronic anticoagulation  -Iron panel done. Iron deficient but Ferritin is elevated at 1186  -Homocysteine normal and methylmalonic pending   -s/p Epo   -Replaced IV iron   -Continue to trend CBC's    Adjustment disorder with depressed mood  Cont SSRI    LVAD (left ventricular assist device) present  -HeartMate 3 Implanted  12/1/2023  as DT  -Cont Coumadin, dosing by PharmD.  Goal INR 2.0-3.0. therapeutic today.   -Antiplatelets Not on ASA  -LDH is stable overall today. Will continue to monitor daily.  -Speed set at  5000     -Interrogation notable for no events  -Not listed for OHTx, declined for OHTX due to comorbidities     Procedure: Device Interrogation Including analysis of device parameters  Current Settings: Ventricular Assist Device  Review of device function is stable        6/23/2024     8:18 AM 6/23/2024     4:42 AM 6/22/2024    11:00 PM 6/22/2024     7:45 PM 6/22/2024     4:15 PM 6/22/2024    12:15 PM 6/22/2024     8:26 AM   TXP LVAD INTERROGATIONS   Type HeartMate3 HeartMate3 HeartMate3 HeartMate3 HeartMate3 HeartMate3 HeartMate3   Flow 4.2 4.5 4.2 4.2 4.6 4.7 4.6   Speed 5000 5000 5000 5000 5000 5000 5000   PI 5 3.3 3 3.1 2.5 2.6 2.7   Power ([place]) 3.5 3.5 3.4 3.7 3.4 3.4 3.5   LSL 4600 4600 4600 4600 4600 4600 4600   Pulsatility  Intermittent pulse Intermittent pulse Intermittent pulse  Intermittent pulse Intermittent pulse         PAF (paroxysmal atrial  fibrillation)  -Continue amiodarone and Coumadin      Type 2 diabetes mellitus without complication, without long-term current use of insulin  -Endocrine    Acute on chronic combined systolic and diastolic heart failure  Owing to his advanced kidney disease and significantly elevated creat, he is not on a  ACEI/ARB/ARNI or MRA. He is also not on a beta blocker due to RV dysfunction.     Plan:   -Volume removal with HD   - See LVAD  - See IVÁN    CAD (coronary artery disease)  -Continue statin        Mariann Bee MD  Heart Transplant  [facility] - Cardiology Stepdown

## 2024-06-24 NOTE — PLAN OF CARE
Pt arrived to 190 for tdc placement. Pt oriented to unit and staff, Pt safely transferred from stretcher to procedural table. Fall risk reviewed and comfort measures utilized with interventions. Safety strap applied, position pillows to minimize pressure points. Blankets applied. Pt prepped and draped utilizing standard sterile technique. Patient placed on continuous monitoring, as required by sedation policy. Timeouts implemented utilizing standard universal time-out per department and facility policy. RN to remain at bedside with continuous monitoring. Pt resting comfortably. Denies pain/discomfort. Will continue to monitor. See flow sheets for monitoring, medication administration, and updates. patient verbalizes understanding.

## 2024-06-24 NOTE — PLAN OF CARE
Recommendations  1. Continue Soft and bite sized diet  2. Continue Novasource Renal   3. RD following     Goals: Meet % of EEN/EPN by RD f/u date  Nutrition Goal Status: goal not met  Communication of RD Recs: POC

## 2024-06-24 NOTE — H&P
Inpatient Radiology Pre-procedure Note    History of Present Illness:  Radha Abbott is a 64 y.o. male with history of LVAD who presents for tunneled dialysis catheter placement under image-guidance with local anesthetic only.     Admission H&P reviewed.  Past Medical History:   Diagnosis Date    CAD (coronary artery disease)     CHF (congestive heart failure)     Diabetes mellitus     HFrEF (heart failure with reduced ejection fraction)     Hypoglycemia 06/12/2024    ICD (implantable cardioverter-defibrillator) in place     MI, old     Type 2 diabetes mellitus without complication, without long-term current use of insulin 10/07/2023     Past Surgical History:   Procedure Laterality Date    ANGIOPLASTY-VENOUS ARTERY Right 12/1/2023    Procedure: ANGIOPLASTY-VENOUS ARTERY, RIGHT FEMORAL;  Surgeon: Yuri Washington MD;  [facility];  Service: Cardiovascular;  Laterality: Right;    AORTIC VALVULOPLASTY N/A 12/1/2023    Procedure: REPAIR, AORTIC VALVE;  Surgeon: Yuri Washington MD;  [facility];  Service: Cardiovascular;  Laterality: N/A;    CARDIAC SURGERY      CLOSURE N/A 12/1/2023    Procedure: CLOSURE, TEMPORARY;  Surgeon: Yuri Washington MD;  Location: [facility] OR [facility];  Service: Cardiovascular;  Laterality: N/A;    DRAINAGE OF PLEURAL EFFUSION  12/4/2023    Procedure: DRAINAGE, PLEURAL EFFUSION;  Surgeon: Yuri Washington MD;  Location: [facility] OR [facility];  Service: Cardiovascular;;    INSERTION OF GRAFT TO PERICARDIUM  12/4/2023    Procedure: INSERTION, GRAFT, PERICARDIUM;  Surgeon: Yuri Washington MD;  Location: [facility] OR [facility];  Service: Cardiovascular;;    INSERTION OF INTRA-AORTIC BALLOON ASSIST DEVICE Right 11/21/2023    Procedure: INSERTION, INTRA-AORTIC BALLOON PUMP;  Surgeon: Finn Cohn MD;  Location: [facility] CATH LAB;  Service: Cardiology;  Laterality: Right;    LEFT VENTRICULAR ASSIST DEVICE Left 12/1/2023    Procedure: INSERTION-LEFT VENTRICULAR ASSIST DEVICE;  Surgeon:  Yuri Washington MD;  [facility];  Service: Cardiovascular;  Laterality: Left;  REDO STERNOTOMY - REDO SAW NEEDED FOR CASE    LYSIS OF ADHESIONS  12/1/2023    Procedure: LYSIS, ADHESIONS;  Surgeon: Yuri Washington MD;  Location: [facility] OR [facility];  Service: Cardiovascular;;    PLACEMENT OF SWAN ROLANDO CATHETER WITH IMAGING GUIDANCE  11/20/2023    Procedure: INSERTION, CATHETER, SWAN-ROLANDO, WITH IMAGING GUIDANCE;  Surgeon: Sajan Hurley MD;  Location: [facility] CATH LAB;  Service: Cardiology;;    REMOVAL OF TUNNELED CENTRAL VENOUS CATHETER (CVC) N/A 3/1/2024    Procedure: REMOVAL, CATHETER, CENTRAL VENOUS, TUNNELED;  Surgeon: Seble Aguilar MD;  Location: [facility] CATH LAB;  Service: Interventional Nephrology;  Laterality: N/A;    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 12/1/2023    Procedure: REPAIR, ANEURYSM, ARTERY, FEMORAL;  Surgeon: Yuri Washington MD;  [address]  Service: Cardiovascular;  Laterality: Right;  Right Femoral Artery Repair    RIGHT HEART CATHETERIZATION Right 10/10/2023    Procedure: INSERTION, CATHETER, RIGHT HEART;  Surgeon: Bin Gandhi MD;  Location: [facility] CATH LAB;  Service: Cardiology;  Laterality: Right;    RIGHT HEART CATHETERIZATION Right 10/13/2023    Procedure: INSERTION, CATHETER, RIGHT HEART;  Surgeon: Walter Mcintyre MD;  Location: [facility] CATH LAB;  Service: Cardiology;  Laterality: Right;    RIGHT HEART CATHETERIZATION  11/13/2023    RIGHT HEART CATHETERIZATION Right 11/13/2023    Procedure: INSERTION, CATHETER, RIGHT HEART;  Surgeon: Juventino Bermudez Jr., MD;  Location: [facility] CATH LAB;  Service: Cardiology;  Laterality: Right;    RIGHT HEART CATHETERIZATION Right 11/20/2023    Procedure: INSERTION, CATHETER, RIGHT HEART;  Surgeon: Sajan Hurley MD;  Location: [facility] CATH LAB;  Service: Cardiology;  Laterality: Right;    RIGHT HEART CATHETERIZATION Right 1/22/2024    Procedure: INSERTION, CATHETER, RIGHT HEART;  Surgeon: Brayan Ocampo MD;   Location: [facility] CATH LAB;  Service: Cardiology;  Laterality: Right;    STERNAL WOUND CLOSURE N/A 12/4/2023    Procedure: CLOSURE, WOUND, STERNUM;  Surgeon: Yuri Washington MD;  [facility];  Service: Cardiovascular;  Laterality: N/A;    STERNOTOMY N/A 12/1/2023    Procedure: STERNOTOMY, REDO;  Surgeon: Yuri Washington MD;  Location: [facility] OR [facility];  Service: Cardiovascular;  Laterality: N/A;    VALVULOPLASTY, MITRAL VALVE N/A 12/1/2023    Procedure: VALVULOPLASTY, MITRAL VALVE;  Surgeon: Yuri Washington MD;  [facility];  Service: Cardiovascular;  Laterality: N/A;       Review of Systems:   As documented in primary team H&P    Home Meds:   Prior to Admission medications    Medication Sig Start Date End Date Taking? Authorizing Provider   acetaminophen (TYLENOL) 500 MG tablet Take 2 tablets (1,000 mg total) by mouth every 8 (eight) hours as needed for Pain. 1/24/24   Sajan Hurley MD   amiodarone (PACERONE) 400 MG tablet Take 1 tablet (400 mg total) by mouth once daily. 2/1/24 1/31/25  Brayan Ocampo MD   amoxicillin (AMOXIL) 500 MG capsule Take 2 capsules (1,000 mg total) by mouth every 12 (twelve) hours. 5/31/24   Sajan Hurley MD   atorvastatin (LIPITOR) 40 MG tablet Take 1 tablet (40 mg total) by mouth every evening. 5/31/24 5/31/25  Sajan Hurley MD   bumetanide (BUMEX) 2 MG tablet Take 2 tablets (4 mg total) by mouth 2 (two) times a day. 3/27/24 3/27/25  Sajan Hurley MD   famotidine (PEPCID) 20 MG tablet Take 1 tablet (20 mg total) by mouth once daily. 2/1/24 1/31/25  Brayan Ocampo MD   fluticasone propionate (FLONASE) 50 mcg/actuation nasal spray 2 sprays (100 mcg total) by Each Nostril route once daily. 1/24/24   Sajan Hurley MD   folic acid (FOLVITE) 1 MG tablet Take 1 tablet (1 mg total) by mouth once daily. 6/1/24 6/1/25  Sajan Hurley MD   gabapentin (NEURONTIN) 100 MG capsule Take 1 capsule (100 mg total) by  mouth 2 (two) times daily. 2/12/24 2/11/25  Walter Mcintyre MD   glimepiride (AMARYL) 1 MG tablet Take 1 mg by mouth daily with breakfast.    Zachary Martinez   hydrALAZINE (APRESOLINE) 100 MG tablet Take 1 tablet (100 mg total) by mouth every 8 (eight) hours. 4/30/24 4/30/25  Sandhya Price MD   magnesium oxide (MAG-OX) 400 mg (241.3 mg magnesium) tablet Take 1 tablet (400 mg total) by mouth once daily. 2/12/24   Walter Mcintyre MD   metOLazone (ZAROXOLYN) 10 MG tablet Take 1 tablet (10 mg total) by mouth as needed (Take once weekly on Wednesdays only if >5lb weight gain in 1 week). 5/31/24   Sajan Hurley MD   omega-3 acid ethyl esters (LOVAZA) 1 gram capsule Take 2 capsules (2 g total) by mouth 2 (two) times daily. 2/1/24 1/31/25  Brayan Ocampo MD   pulse oximeter (PULSE OXIMETER) device by Apply Externally route 2 (two) times a day. Use twice daily at 8 AM and 3 PM and record the value in [facility] as directed. 5/1/24   Larissa Jesus PA   senna-docusate 8.6-50 mg (PERICOLACE) 8.6-50 mg per tablet Take 2 tablets by mouth daily as needed for Constipation. 2/12/24   Walter Mcintyre MD   traZODone (DESYREL) 50 MG tablet Take 0.5 tablets (25 mg total) by mouth every evening. 2/1/24 1/31/25  Brayan Ocampo MD   venlafaxine (EFFEXOR) 37.5 MG Tab Take 1 tablet (37.5 mg total) by mouth once daily. 2/1/24 1/31/25  Brayan Ocampo MD   vitamin D (VITAMIN D3) 1000 units Tab Take 1 tablet (1,000 Units total) by mouth once daily. 2/1/24   Brayan Ocampo MD   warfarin (COUMADIN) 2.5 MG tablet Take 1 tablet (2.5 mg total) by mouth Daily. 5/31/24   Sajan Hurley MD     Scheduled Meds:    0.9% NaCl   Intravenous Once    amiodarone  400 mg Oral Daily    amoxicillin  500 mg Per NG tube Daily    atorvastatin  40 mg Oral QHS    [START ON 6/25/2024] epoetin zohaib (PROCRIT) injection  10,000 Units Intravenous Every Mon, Wed, Fri    folic acid  1 mg Oral Daily    gabapentin  100 mg Oral BID    LIDOcaine  2  patch Transdermal Q24H    midodrine  10 mg Oral Q8H    pantoprazole  40 mg Oral Daily    QUEtiapine  100 mg Oral QHS    sodium bicarbonate  650 mg Oral TID    venlafaxine  37.5 mg Oral Daily    vitamin D  1,000 Units Oral Daily    warfarin  2.5 mg Oral Daily     Continuous Infusions:   PRN Meds:  Current Facility-Administered Medications:     0.9%  NaCl infusion (for blood administration), , Intravenous, Q24H PRN    0.9% NaCl, , Intravenous, PRN    acetaminophen, 650 mg, Oral, Q6H PRN    acetaminophen, 650 mg, Per NG tube, Q6H PRN    dextrose 10%, 12.5 g, Intravenous, PRN    dextrose 10%, 12.5 g, Intravenous, PRN    dextrose 10%, 25 g, Intravenous, PRN    dextrose 10%, 25 g, Intravenous, PRN    glucagon (human recombinant), 1 mg, Intramuscular, PRN    glucose, 16 g, Oral, PRN    glucose, 24 g, Oral, PRN    insulin aspart U-100, 0-10 Units, Subcutaneous, QID (AC + HS) PRN    simethicone, 1 tablet, Oral, TID PRN    traZODone, 25 mg, Oral, Nightly PRN  Anticoagulants/Antiplatelets: Coumadin    Allergies: Review of patient's allergies indicates:  No Known Allergies  Sedation Hx: have not been any systemic reactions    Labs:  Recent Labs   Lab 06/24/24  0533   INR 2.1*       Recent Labs   Lab 06/24/24  0533   WBC 7.78   HGB 7.7*   HCT 23.3*   MCV 86         Recent Labs   Lab 06/24/24  0533   GLU 89      K 4.4   CL 93*   CO2 27   BUN 89*   CREATININE 6.1*   CALCIUM 9.0   MG 2.5   ALT 70*   AST 68*   ALBUMIN 2.0*   BILITOT 0.5   BILIDIR 0.3         Vitals:  Temp: 97.8 °F (36.6 °C) (06/24/24 1200)  Pulse: 78 (06/24/24 1357)  Resp: 17 (06/24/24 1357)  BP: (!) 87/0 (06/24/24 1357)  SpO2: 100 % (06/24/24 1357)     Physical Exam:  ASA: 3  Mallampati: 3    Plan: tunneled dialysis catheter placement  Sedation Plan: Local only    Jadyn Colon MD  Radiology, PGY III  [facility]

## 2024-06-24 NOTE — ASSESSMENT & PLAN NOTE
Mr. Radha Abbott is a 65 yo male with stage D HFrEF, ICMP who underwent HM3 placement as DT, DM2, CKD4, HTN, E.faecalis bacteremia who presented due to c/o worsening generalized weakness since 6/10. Admitted for worsening renal function with a BUN>100/Cr. 9.9.     - CT Ab/pel: Kidneys/ Ureters: Normal in size and location.  Mild bilateral perinephric stranding, a nonspecific finding and similar to prior.  No hydronephrosis or nephrolithiasis. No ureteral dilatation.  - tunneled line placed 6/24 for HD   - HD per nephro   - Will need OP HD upon discharge, recommend CM/SW involvement to start workup.  - Renally dosing medications

## 2024-06-24 NOTE — NURSING TRANSFER
Nursing Transfer Note      6/24/2024   3:15 PM    Nurse giving handoff:MILAN  Nurse receiving handoff:Jose FRN    Reason patient is being transferred: tunneled cath done    Transfer To: room 319    Transfer via stretcher    Transfer with cardiac monitoring    Transported by transportation team    Telemetry: Box Number [id number]  Order for Tele Monitor? Yes    4eyes on Skin: yes    Medicines sent: none    Any special needs or follow-up needed: LVAD emergency bag    Patient belongings transferred with patient: Yes LVAD emergency bag      Chart send with patient: Yes    Notified: spouse    Patient reassessed at: 1515 06/24/2024   Upon arrival to floor: cardiac monitor applied, patient oriented to room, call bell in reach, and bed in lowest position

## 2024-06-24 NOTE — PROGRESS NOTES
[facility] - Cardiology Stepdown  Nephrology  Progress Note    Patient Name: Radha Abbott  MRN: [id number]  Admission Date: 6/11/2024  Hospital Length of Stay: 13 days  Attending Provider: Sajan Hurley, *   Primary Care Physician: Vasu Kong MD  Principal Problem:Acute renal failure superimposed on stage 4 chronic kidney disease    Subjective:     HPI: Mr. Abbott is a 64-year-old man with ischemic cardiomyopathy with most recent TTE showing EF 10 -15% and G3DD s/p [employer] ICM placement previously on chronic dobutamine infusion however now with Heartmate III LVAD placed on 12/01/2023, CAD s/p x3 vessel CABG back in 2009, type 2 diabetes mellitus (most recent hemoglobin A1c 7.4%), hypertension, HLD, history of ventricular fibrillation for which he is on amiodarone, chronic AC with Coumadin, advanced CKD V (baseline creatinine ~6) with recurrent AKIs and previous dialysis dependence (tunneled HD catheter removed in early March of this year). He had a recent hospitalization where he was treated for fluid overload acute on chronic CHF exacerbation and IVÁN thought to be 2/2 type 2 CRS and was subsequently diuresed and discharged with a creatinine of 6.2. He presents to the hospital on 6/11 for concerns of worsening fatigue that started one day prior to him arriving to the hospital, and has had difficulties getting out of bed. He has also noted a decrease in his urine output over the past 36 hours despite Diuretic use. He admits adherence to his Metolazone and Bumex at home, however has not passed any urine in the past 10 hours. Bed side bladder scan revealed 200 mL of urine in his bladder. Upon arrival to the ER, his is hemodynamically stable, Labs showed Hgb of 6.3, electrolytes within acceptable limits, Serum CO2 of 21, BUN of 118, and a creatinine of 9.8. He denies feeling fluid overloaded or short of breath, reports good appetite, denies nausea, vomiting, or metallic taste in his mouth. Chest xray  showed mild central vascular congestion and interstitial pulmonary opacities which appear improved compared to previous studies. CT abdomen/Pelvis without contrast revealed Kidneys that were reported as normal in size and location, and without hydronephrosis or ureteral dilation.     Interval History:   HD completed on Saturday, tolerated well.  Electrolytes stable and non-emergent this morning.  Volume status acceptable.    Oxygenating well on RA.    Review of patient's allergies indicates:  No Known Allergies  Current Facility-Administered Medications   Medication Frequency    0.9%  NaCl infusion (for blood administration) Q24H PRN    0.9%  NaCl infusion PRN    0.9%  NaCl infusion Once    acetaminophen tablet 650 mg Q6H PRN    acetaminophen tablet 650 mg Q6H PRN    amiodarone tablet 400 mg Daily    amoxicillin 400 mg/5 mL suspension 500 mg Daily    atorvastatin tablet 40 mg QHS    dextrose 10% bolus 125 mL 125 mL PRN    dextrose 10% bolus 125 mL 125 mL PRN    dextrose 10% bolus 250 mL 250 mL PRN    dextrose 10% bolus 250 mL 250 mL PRN    epoetin zohaib injection 4,000 Units Every Mon, Wed, Fri    folic acid tablet 1 mg Daily    gabapentin capsule 100 mg BID    glucagon (human recombinant) injection 1 mg PRN    glucose chewable tablet 16 g PRN    glucose chewable tablet 24 g PRN    insulin aspart U-100 pen 0-10 Units QID (AC + HS) PRN    LIDOcaine 5 % patch 2 patch Q24H    midodrine tablet 10 mg Q8H    pantoprazole EC tablet 40 mg Daily    QUEtiapine tablet 100 mg QHS    simethicone chewable tablet 80 mg TID PRN    sodium bicarbonate tablet 650 mg TID    trazodone split tablet 25 mg Nightly PRN    venlafaxine tablet 37.5 mg Daily    vitamin D 1000 units tablet 1,000 Units Daily    warfarin (COUMADIN) tablet 2.5 mg Daily       Objective:     Vital Signs (Most Recent):  Temp: 98.2 °F (36.8 °C) (06/24/24 0750)  Pulse: 77 (06/24/24 1102)  Resp: 18 (06/24/24 0750)  BP: (!) 72/0 (06/24/24 0750)  SpO2: 96 % (06/24/24 0750)  Vital Signs (24h Range):  Temp:  [97.5 °F (36.4 °C)-98.3 °F (36.8 °C)] 98.2 °F (36.8 °C)  Pulse:  [75-87] 77  Resp:  [18] 18  SpO2:  [93 %-98 %] 96 %  BP: (70-88)/(0-60) 72/0     Weight: 72.8 kg (160 lb 7.9 oz) (06/24/24 0800)  Body mass index is 21.17 kg/m².  Body surface area is 1.94 meters squared.    No intake/output data recorded.     Physical Exam  Vitals and nursing note reviewed.   Constitutional:       General: He is sleeping.      Appearance: Normal appearance.   HENT:      Head: Normocephalic and atraumatic.   Eyes:      Conjunctiva/sclera: Conjunctivae normal.      Pupils: Pupils are equal, round, and reactive to light.   Neck:      Comments: RIJ trialysis cath.   Cardiovascular:      Rate and Rhythm: Normal rate and regular rhythm.      Comments: Smooth VAD hum  Pulmonary:      Effort: Pulmonary effort is normal. No respiratory distress.      Breath sounds: Normal breath sounds. No wheezing or rales.   Abdominal:      General: Bowel sounds are normal.      Palpations: Abdomen is soft.   Musculoskeletal:         General: No swelling. Normal range of motion.      Cervical back: Normal range of motion and neck supple.      Right lower leg: No edema.      Left lower leg: No edema.   Skin:     General: Skin is warm and dry.   Neurological:      General: No focal deficit present.          Significant Labs:  CBC:   Recent Labs   Lab 06/24/24  0533   WBC 7.78   RBC 2.71*   HGB 7.7*   HCT 23.3*      MCV 86   MCH 28.4   MCHC 33.0     CMP:   Recent Labs   Lab 06/24/24  0533   GLU 89   CALCIUM 9.0   ALBUMIN 2.0*   PROT 7.2      K 4.4   CO2 27   CL 93*   BUN 89*   CREATININE 6.1*   ALKPHOS 256*   ALT 70*   AST 68*   BILITOT 0.5        Assessment/Plan:     Cardiac/Vascular  Acute on chronic combined systolic and diastolic heart failure  UF with HD    Renal/  * Acute renal failure superimposed on stage 4 chronic kidney disease  Patient with a baseline eGFR that appears to be variable however was noted  to be in the 15-20 range in February before his recent IVÁN in May, presents now with fatigue and worsening IVÁN with a creatinine of 9.8 and eGFR of 5.5. BUN was noted to be elevated at 117, other electrolytes were normal. He was noted to have some asterixis on exam (which he states is chronic for him and unchanged from his baseline), however denies any other signs or symptoms of uremia. Bedside bladder scan revealed 200 mL of urine in his bladder and his last bladder void was 10 hours prior to this consult.     SLED initiated 6/13 x4 hours.  Mental status improved.      Recommendations:     -will continue 3x week dialysis   -Next HD planned for Tuesday pending labs and volume status.   -Na bicarb tabs 650 mg po TID  -Avoid nephrotoxic agents (IV contrast, NSAID's, gadolinium contrast, PPI's) if able.   -Strict I's and O's  -Renally dose all medications  -will need OP HD upon discharge, recommend CM/SW involvement to start workup.  -IR for TDC placement today    Oncology  Anemia  -EPO with HD; increase dose  -Feraheme 6/19; 2nd dose with next HD session      Norm Vidal, NP  Nephrology  [facility] - Cardiology Stepdown

## 2024-06-24 NOTE — PLAN OF CARE
Pt tolerated hemodialysis cath well, rg neck dressing c/d/I, called floor nurse with report, transfer to [facility] aw[facility] pickup   no

## 2024-06-24 NOTE — SUBJECTIVE & OBJECTIVE
Interval History:   HD completed on Saturday, tolerated well.  Electrolytes stable and non-emergent this morning.  Volume status acceptable.    Oxygenating well on RA.    Review of patient's allergies indicates:  No Known Allergies  Current Facility-Administered Medications   Medication Frequency    0.9%  NaCl infusion (for blood administration) Q24H PRN    0.9%  NaCl infusion PRN    0.9%  NaCl infusion Once    acetaminophen tablet 650 mg Q6H PRN    acetaminophen tablet 650 mg Q6H PRN    amiodarone tablet 400 mg Daily    amoxicillin 400 mg/5 mL suspension 500 mg Daily    atorvastatin tablet 40 mg QHS    dextrose 10% bolus 125 mL 125 mL PRN    dextrose 10% bolus 125 mL 125 mL PRN    dextrose 10% bolus 250 mL 250 mL PRN    dextrose 10% bolus 250 mL 250 mL PRN    epoetin zohaib injection 4,000 Units Every Mon, Wed, Fri    folic acid tablet 1 mg Daily    gabapentin capsule 100 mg BID    glucagon (human recombinant) injection 1 mg PRN    glucose chewable tablet 16 g PRN    glucose chewable tablet 24 g PRN    insulin aspart U-100 pen 0-10 Units QID (AC + HS) PRN    LIDOcaine 5 % patch 2 patch Q24H    midodrine tablet 10 mg Q8H    pantoprazole EC tablet 40 mg Daily    QUEtiapine tablet 100 mg QHS    simethicone chewable tablet 80 mg TID PRN    sodium bicarbonate tablet 650 mg TID    trazodone split tablet 25 mg Nightly PRN    venlafaxine tablet 37.5 mg Daily    vitamin D 1000 units tablet 1,000 Units Daily    warfarin (COUMADIN) tablet 2.5 mg Daily       Objective:     Vital Signs (Most Recent):  Temp: 98.2 °F (36.8 °C) (06/24/24 0750)  Pulse: 77 (06/24/24 1102)  Resp: 18 (06/24/24 0750)  BP: (!) 72/0 (06/24/24 0750)  SpO2: 96 % (06/24/24 0750) Vital Signs (24h Range):  Temp:  [97.5 °F (36.4 °C)-98.3 °F (36.8 °C)] 98.2 °F (36.8 °C)  Pulse:  [75-87] 77  Resp:  [18] 18  SpO2:  [93 %-98 %] 96 %  BP: (70-88)/(0-60) 72/0     Weight: 72.8 kg (160 lb 7.9 oz) (06/24/24 0800)  Body mass index is 21.17 kg/m².  Body surface area is  1.94 meters squared.    No intake/output data recorded.     Physical Exam  Vitals and nursing note reviewed.   Constitutional:       General: He is sleeping.      Appearance: Normal appearance.   HENT:      Head: Normocephalic and atraumatic.   Eyes:      Conjunctiva/sclera: Conjunctivae normal.      Pupils: Pupils are equal, round, and reactive to light.   Neck:      Comments: RIJ trialysis cath.   Cardiovascular:      Rate and Rhythm: Normal rate and regular rhythm.      Comments: Smooth VAD hum  Pulmonary:      Effort: Pulmonary effort is normal. No respiratory distress.      Breath sounds: Normal breath sounds. No wheezing or rales.   Abdominal:      General: Bowel sounds are normal.      Palpations: Abdomen is soft.   Musculoskeletal:         General: No swelling. Normal range of motion.      Cervical back: Normal range of motion and neck supple.      Right lower leg: No edema.      Left lower leg: No edema.   Skin:     General: Skin is warm and dry.   Neurological:      General: No focal deficit present.          Significant Labs:  CBC:   Recent Labs   Lab 06/24/24  0533   WBC 7.78   RBC 2.71*   HGB 7.7*   HCT 23.3*      MCV 86   MCH 28.4   MCHC 33.0     CMP:   Recent Labs   Lab 06/24/24  0533   GLU 89   CALCIUM 9.0   ALBUMIN 2.0*   PROT 7.2      K 4.4   CO2 27   CL 93*   BUN 89*   CREATININE 6.1*   ALKPHOS 256*   ALT 70*   AST 68*   BILITOT 0.5

## 2024-06-24 NOTE — PT/OT/SLP PROGRESS
Speech Language Pathology      Radha Abbott  MRN: [id number]    Patient not seen today secondary to NPO for pending procedure later this date. Speech service to continue to follow.     Manjula Epperson MS, CCC-SLP  Speech Language Pathologist  Pager: [phone]  Date 6/24/2024

## 2024-06-24 NOTE — CARE UPDATE
BG goal 140-180    -A1C   Lab Results   Component Value Date    HGBA1C 7.4 (H) 04/17/2024       -HOME REGIMEN: Amaryl 1 mg (recently prescribed by PCP about a week ago)        -INPATIENT REGIMEN: Novolog Correction Scale     -GLUCOSE TREND FOR THE PAST 24HRS: 103-236    -NO HYPOGYCEMIAS NOTED       -Diet: Diet NPO      -Steroids -  n/a     -Tube Feeds -  off and NG tube pulled on 6/22      Remains in CICU. BG stable with minimal prn SQ insulin requirements. Creatinine 6.1; HD.       Plan:  -Continue Moderate Dose Correction Scale   -BG monitoring ac/hs  -Hypoglycemia protocol in place    ** Please call Endocrine for any BG related issues **     Discharge planning:tbd    Endocrine to continue to follow    ** Please call Endocrine for any BG related issues **

## 2024-06-24 NOTE — NURSING
Notified  pt tunneled cath done and back to room, and moderate bloody drainage noted at tunneled cath dressing, will change change dressing per protocol. Notified  MAP 90, doppler 84, ok to hold scheduled midodrine.

## 2024-06-24 NOTE — PROCEDURES
Radiology Post-Procedure Note    Pre Op Diagnosis: ESRD    Post Op Diagnosis: Same    Procedure: Tunneled line placement    Procedure performed by: Wayne VALDEZ, Ian    Written Informed Consent Obtained: Yes  Specimen Removed: NO  Estimated Blood Loss: Minimal    Findings:   Via rt IJ, 23 cm tip to cuff, 15 Fr tunneled HD line placed with tip in RA. No complications. See dictation. Line ready for use.    Patient tolerated procedure well.    Ian Black M.D.  Interventional Radiology  Department of Radiology

## 2024-06-24 NOTE — ASSESSMENT & PLAN NOTE
Patient with a baseline eGFR that appears to be variable however was noted to be in the 15-20 range in February before his recent IVÁN in May, presents now with fatigue and worsening IVÁN with a creatinine of 9.8 and eGFR of 5.5. BUN was noted to be elevated at 117, other electrolytes were normal. He was noted to have some asterixis on exam (which he states is chronic for him and unchanged from his baseline), however denies any other signs or symptoms of uremia. Bedside bladder scan revealed 200 mL of urine in his bladder and his last bladder void was 10 hours prior to this consult.     SLED initiated 6/13 x4 hours.  Mental status improved.      Recommendations:     -will continue 3x week dialysis   -Next HD planned for Tuesday pending labs and volume status.   -Na bicarb tabs 650 mg po TID  -Avoid nephrotoxic agents (IV contrast, NSAID's, gadolinium contrast, PPI's) if able.   -Strict I's and O's  -Renally dose all medications  -will need OP HD upon discharge, recommend CM/SW involvement to start workup.  -IR for TDC placement today

## 2024-06-24 NOTE — SUBJECTIVE & OBJECTIVE
Interval History:   -NPO for tunneled line this morning    Subjective:  -doing well, no questions for team    Continuous Infusions:  Scheduled Meds:   0.9% NaCl   Intravenous Once    amiodarone  400 mg Oral Daily    amoxicillin  500 mg Per NG tube Daily    atorvastatin  40 mg Oral QHS    [START ON 6/25/2024] epoetin zohaib (PROCRIT) injection  10,000 Units Intravenous Every Mon, Wed, Fri    folic acid  1 mg Oral Daily    gabapentin  100 mg Oral BID    LIDOcaine  2 patch Transdermal Q24H    midodrine  10 mg Oral Q8H    pantoprazole  40 mg Oral Daily    QUEtiapine  100 mg Oral QHS    sodium bicarbonate  650 mg Oral TID    venlafaxine  37.5 mg Oral Daily    vitamin D  1,000 Units Oral Daily    warfarin  2.5 mg Oral Daily     PRN Meds:  Current Facility-Administered Medications:     0.9%  NaCl infusion (for blood administration), , Intravenous, Q24H PRN    0.9% NaCl, , Intravenous, PRN    acetaminophen, 650 mg, Oral, Q6H PRN    acetaminophen, 650 mg, Per NG tube, Q6H PRN    dextrose 10%, 12.5 g, Intravenous, PRN    dextrose 10%, 12.5 g, Intravenous, PRN    dextrose 10%, 25 g, Intravenous, PRN    dextrose 10%, 25 g, Intravenous, PRN    glucagon (human recombinant), 1 mg, Intramuscular, PRN    glucose, 16 g, Oral, PRN    glucose, 24 g, Oral, PRN    insulin aspart U-100, 0-10 Units, Subcutaneous, QID (AC + HS) PRN    simethicone, 1 tablet, Oral, TID PRN    traZODone, 25 mg, Oral, Nightly PRN    Review of patient's allergies indicates:  No Known Allergies  Objective:     Vital Signs (Most Recent):  Temp: 97.8 °F (36.6 °C) (06/24/24 1530)  Pulse: 79 (06/24/24 1530)  Resp: 18 (06/24/24 1530)  BP: 105/82 (06/24/24 1531)  SpO2: 99 % (06/24/24 1530) Vital Signs (24h Range):  Temp:  [97.5 °F (36.4 °C)-98.2 °F (36.8 °C)] 97.8 °F (36.6 °C)  Pulse:  [75-86] 79  Resp:  [15-18] 18  SpO2:  [93 %-100 %] 99 %  BP: ()/(0-82) 105/82     Patient Vitals for the past 72 hrs (Last 3 readings):   Weight   06/24/24 0800 72.8 kg (160 lb 7.9  oz)   06/24/24 0445 76 kg (167 lb 8.8 oz)   06/23/24 0503 75.6 kg (166 lb 10.7 oz)     Body mass index is 21.17 kg/m².    No intake or output data in the 24 hours ending 06/24/24 1549    Hemodynamic Parameters:  CVP:  [2 mmHg] 2 mmHg    Telemetry: sinus rhythm       Physical Exam  Constitutional:       Appearance: Normal appearance.   Cardiovascular:      Comments: VAD hum; no elevated JVP  Pulmonary:      Effort: Pulmonary effort is normal.      Breath sounds: Normal breath sounds.   Musculoskeletal:      Right lower leg: No edema.      Left lower leg: No edema.   Skin:     General: Skin is warm and dry.   Neurological:      Mental Status: He is alert.            Significant Labs:  CBC:  Recent Labs   Lab 06/22/24  0232 06/23/24  0456 06/24/24  0533   WBC 9.83 9.06 7.78   RBC 2.92* 2.99* 2.71*   HGB 8.2* 8.3* 7.7*   HCT 24.6* 25.6* 23.3*    399 395   MCV 84 86 86   MCH 28.1 27.8 28.4   MCHC 33.3 32.4 33.0     BNP:  Recent Labs   Lab 06/19/24  0312 06/21/24  0451 06/24/24  0533   * 1,328* 523*     CMP:  Recent Labs   Lab 06/19/24  0312 06/20/24  0308 06/21/24  0451 06/22/24  0232 06/22/24  0808 06/23/24  0456 06/24/24  0533   *   < > 198*   < > 218* 72 89   CALCIUM 8.3*   < > 8.7   < > 8.7 9.1 9.0   ALBUMIN 2.1*  --  2.1*  --  2.1*  --  2.0*   PROT 7.2  --  7.7  --   --   --  7.2   *   < > 134*   < > 134* 134* 136   K 3.7   < > 4.0   < > 3.7 4.4 4.4   CO2 18*   < > 16*   < > 24 26 27      < > 106   < > 95 94* 93*   BUN 32*   < > 54*   < > 43* 65* 89*   CREATININE 2.7*   < > 3.7*   < > 3.0* 5.0* 6.1*   ALKPHOS 369*  --  387*  --   --   --  256*   *  --  104*  --   --   --  70*   *  --  67*  --   --   --  68*   BILITOT 0.5  --  0.4  --   --   --  0.5    < > = values in this interval not displayed.      Coagulation:   Recent Labs   Lab 06/22/24 0232 06/23/24  0456 06/24/24  0533   INR 2.5* 2.0* 2.1*   APTT 47.3* 38.6* 40.0*     LDH:  Recent Labs   Lab 06/22/24 0232  06/23/24  0456 06/24/24  0533   * 368* 334*     Microbiology:  Microbiology Results (last 7 days)       ** No results found for the last 168 hours. **            I have reviewed all pertinent labs within the past 24 hours.    Estimated Creatinine Clearance: 12.9 mL/min (A) (based on SCr of 6.1 mg/dL (H)).    Diagnostic Results:  I have reviewed and interpreted all pertinent imaging results/findings within the past 24 hours.

## 2024-06-25 PROBLEM — N18.9 CHRONIC KIDNEY DISEASE-MINERAL AND BONE DISORDER: Status: ACTIVE | Noted: 2024-06-25

## 2024-06-25 PROBLEM — E83.9 CHRONIC KIDNEY DISEASE-MINERAL AND BONE DISORDER: Status: ACTIVE | Noted: 2024-06-25

## 2024-06-25 PROBLEM — M89.9 CHRONIC KIDNEY DISEASE-MINERAL AND BONE DISORDER: Status: ACTIVE | Noted: 2024-06-25

## 2024-06-25 LAB
ANION GAP SERPL CALC-SCNC: 18 MMOL/L (ref 8–16)
APTT PPP: 40.1 SEC (ref 21–32)
BASOPHILS # BLD AUTO: 0.03 K/UL (ref 0–0.2)
BASOPHILS NFR BLD: 0.5 % (ref 0–1.9)
BUN SERPL-MCNC: 107 MG/DL (ref 8–23)
CALCIUM SERPL-MCNC: 9.1 MG/DL (ref 8.7–10.5)
CHLORIDE SERPL-SCNC: 95 MMOL/L (ref 95–110)
CO2 SERPL-SCNC: 22 MMOL/L (ref 23–29)
CREAT SERPL-MCNC: 7.1 MG/DL (ref 0.5–1.4)
DIFFERENTIAL METHOD BLD: ABNORMAL
EOSINOPHIL # BLD AUTO: 0.1 K/UL (ref 0–0.5)
EOSINOPHIL NFR BLD: 2.1 % (ref 0–8)
ERYTHROCYTE [DISTWIDTH] IN BLOOD BY AUTOMATED COUNT: 18.4 % (ref 11.5–14.5)
EST. GFR  (NO RACE VARIABLE): 8.1 ML/MIN/1.73 M^2
GLUCOSE SERPL-MCNC: 134 MG/DL (ref 70–110)
HCT VFR BLD AUTO: 25.9 % (ref 40–54)
HGB BLD-MCNC: 8.3 G/DL (ref 14–18)
IMM GRANULOCYTES # BLD AUTO: 0.04 K/UL (ref 0–0.04)
IMM GRANULOCYTES NFR BLD AUTO: 0.6 % (ref 0–0.5)
INR PPP: 2.2 (ref 0.8–1.2)
LDH SERPL L TO P-CCNC: 313 U/L (ref 110–260)
LYMPHOCYTES # BLD AUTO: 0.7 K/UL (ref 1–4.8)
LYMPHOCYTES NFR BLD: 11.2 % (ref 18–48)
MAGNESIUM SERPL-MCNC: 2.6 MG/DL (ref 1.6–2.6)
MCH RBC QN AUTO: 27.5 PG (ref 27–31)
MCHC RBC AUTO-ENTMCNC: 32 G/DL (ref 32–36)
MCV RBC AUTO: 86 FL (ref 82–98)
MONOCYTES # BLD AUTO: 0.5 K/UL (ref 0.3–1)
MONOCYTES NFR BLD: 7.7 % (ref 4–15)
NEUTROPHILS # BLD AUTO: 4.9 K/UL (ref 1.8–7.7)
NEUTROPHILS NFR BLD: 77.9 % (ref 38–73)
NRBC BLD-RTO: 0 /100 WBC
PHOSPHATE SERPL-MCNC: 8.3 MG/DL (ref 2.7–4.5)
PLATELET # BLD AUTO: 426 K/UL (ref 150–450)
PMV BLD AUTO: 10.4 FL (ref 9.2–12.9)
POCT GLUCOSE: 158 MG/DL (ref 70–110)
POCT GLUCOSE: 161 MG/DL (ref 70–110)
POCT GLUCOSE: 180 MG/DL (ref 70–110)
POCT GLUCOSE: 196 MG/DL (ref 70–110)
POTASSIUM SERPL-SCNC: 4.7 MMOL/L (ref 3.5–5.1)
PROTHROMBIN TIME: 22.8 SEC (ref 9–12.5)
RBC # BLD AUTO: 3.02 M/UL (ref 4.6–6.2)
SODIUM SERPL-SCNC: 135 MMOL/L (ref 136–145)
WBC # BLD AUTO: 6.26 K/UL (ref 3.9–12.7)

## 2024-06-25 PROCEDURE — 99231 SBSQ HOSP IP/OBS SF/LOW 25: CPT | Mod: ,,, | Performed by: NURSE PRACTITIONER

## 2024-06-25 PROCEDURE — 25000003 PHARM REV CODE 250: Performed by: STUDENT IN AN ORGANIZED HEALTH CARE EDUCATION/TRAINING PROGRAM

## 2024-06-25 PROCEDURE — 27000248 HC VAD-ADDITIONAL DAY

## 2024-06-25 PROCEDURE — 85610 PROTHROMBIN TIME: CPT | Performed by: STUDENT IN AN ORGANIZED HEALTH CARE EDUCATION/TRAINING PROGRAM

## 2024-06-25 PROCEDURE — 25000003 PHARM REV CODE 250: Performed by: NURSE PRACTITIONER

## 2024-06-25 PROCEDURE — 99233 SBSQ HOSP IP/OBS HIGH 50: CPT | Mod: FS,,, | Performed by: INTERNAL MEDICINE

## 2024-06-25 PROCEDURE — 85025 COMPLETE CBC W/AUTO DIFF WBC: CPT | Performed by: STUDENT IN AN ORGANIZED HEALTH CARE EDUCATION/TRAINING PROGRAM

## 2024-06-25 PROCEDURE — 83615 LACTATE (LD) (LDH) ENZYME: CPT | Performed by: STUDENT IN AN ORGANIZED HEALTH CARE EDUCATION/TRAINING PROGRAM

## 2024-06-25 PROCEDURE — 20600001 HC STEP DOWN PRIVATE ROOM

## 2024-06-25 PROCEDURE — 93750 INTERROGATION VAD IN PERSON: CPT | Mod: ,,, | Performed by: INTERNAL MEDICINE

## 2024-06-25 PROCEDURE — 25000003 PHARM REV CODE 250: Performed by: INTERNAL MEDICINE

## 2024-06-25 PROCEDURE — 85730 THROMBOPLASTIN TIME PARTIAL: CPT | Performed by: STUDENT IN AN ORGANIZED HEALTH CARE EDUCATION/TRAINING PROGRAM

## 2024-06-25 PROCEDURE — 83735 ASSAY OF MAGNESIUM: CPT | Performed by: STUDENT IN AN ORGANIZED HEALTH CARE EDUCATION/TRAINING PROGRAM

## 2024-06-25 PROCEDURE — 92526 ORAL FUNCTION THERAPY: CPT

## 2024-06-25 PROCEDURE — 80048 BASIC METABOLIC PNL TOTAL CA: CPT | Performed by: STUDENT IN AN ORGANIZED HEALTH CARE EDUCATION/TRAINING PROGRAM

## 2024-06-25 PROCEDURE — 25000003 PHARM REV CODE 250: Performed by: REGISTERED NURSE

## 2024-06-25 PROCEDURE — 36415 COLL VENOUS BLD VENIPUNCTURE: CPT | Performed by: STUDENT IN AN ORGANIZED HEALTH CARE EDUCATION/TRAINING PROGRAM

## 2024-06-25 PROCEDURE — 84100 ASSAY OF PHOSPHORUS: CPT | Performed by: STUDENT IN AN ORGANIZED HEALTH CARE EDUCATION/TRAINING PROGRAM

## 2024-06-25 PROCEDURE — 94761 N-INVAS EAR/PLS OXIMETRY MLT: CPT

## 2024-06-25 PROCEDURE — 63600175 PHARM REV CODE 636 W HCPCS: Mod: JZ,JG | Performed by: NURSE PRACTITIONER

## 2024-06-25 PROCEDURE — 80100014 HC HEMODIALYSIS 1:1

## 2024-06-25 PROCEDURE — 99233 SBSQ HOSP IP/OBS HIGH 50: CPT | Mod: FS,,, | Performed by: HOSPITALIST

## 2024-06-25 PROCEDURE — 25000003 PHARM REV CODE 250: Performed by: PHYSICIAN ASSISTANT

## 2024-06-25 RX ORDER — SEVELAMER CARBONATE FOR ORAL SUSPENSION 800 MG/1
0.8 POWDER, FOR SUSPENSION ORAL
Status: DISCONTINUED | OUTPATIENT
Start: 2024-06-25 | End: 2024-07-01 | Stop reason: HOSPADM

## 2024-06-25 RX ORDER — POTASSIUM CHLORIDE 7.45 MG/ML
INJECTION INTRAVENOUS
Status: DISPENSED
Start: 2024-06-25 | End: 2024-06-26

## 2024-06-25 RX ADMIN — SEVELAMER CARBONATE 0.8 G: 800 POWDER, FOR SUSPENSION ORAL at 05:06

## 2024-06-25 RX ADMIN — MIDODRINE HYDROCHLORIDE 10 MG: 5 TABLET ORAL at 02:06

## 2024-06-25 RX ADMIN — ATORVASTATIN CALCIUM 40 MG: 40 TABLET, FILM COATED ORAL at 09:06

## 2024-06-25 RX ADMIN — WARFARIN SODIUM 2.5 MG: 2.5 TABLET ORAL at 05:06

## 2024-06-25 RX ADMIN — QUETIAPINE FUMARATE 100 MG: 25 TABLET ORAL at 09:06

## 2024-06-25 RX ADMIN — FOLIC ACID 1 MG: 1 TABLET ORAL at 08:06

## 2024-06-25 RX ADMIN — FERUMOXYTOL 510 MG: 510 INJECTION INTRAVENOUS at 02:06

## 2024-06-25 RX ADMIN — SODIUM BICARBONATE 650 MG TABLET 650 MG: at 09:06

## 2024-06-25 RX ADMIN — INSULIN ASPART 2 UNITS: 100 INJECTION, SOLUTION INTRAVENOUS; SUBCUTANEOUS at 08:06

## 2024-06-25 RX ADMIN — INSULIN ASPART 2 UNITS: 100 INJECTION, SOLUTION INTRAVENOUS; SUBCUTANEOUS at 12:06

## 2024-06-25 RX ADMIN — SODIUM BICARBONATE 650 MG TABLET 650 MG: at 08:06

## 2024-06-25 RX ADMIN — SODIUM BICARBONATE 650 MG TABLET 650 MG: at 02:06

## 2024-06-25 RX ADMIN — INSULIN ASPART 2 UNITS: 100 INJECTION, SOLUTION INTRAVENOUS; SUBCUTANEOUS at 05:06

## 2024-06-25 RX ADMIN — GABAPENTIN 100 MG: 100 CAPSULE ORAL at 08:06

## 2024-06-25 RX ADMIN — INSULIN ASPART 2 UNITS: 100 INJECTION, SOLUTION INTRAVENOUS; SUBCUTANEOUS at 09:06

## 2024-06-25 RX ADMIN — VENLAFAXINE 37.5 MG: 37.5 TABLET ORAL at 08:06

## 2024-06-25 RX ADMIN — CHOLECALCIFEROL TAB 25 MCG (1000 UNIT) 1000 UNITS: 25 TAB at 08:06

## 2024-06-25 RX ADMIN — MIDODRINE HYDROCHLORIDE 10 MG: 5 TABLET ORAL at 06:06

## 2024-06-25 RX ADMIN — AMIODARONE HYDROCHLORIDE 400 MG: 200 TABLET ORAL at 08:06

## 2024-06-25 RX ADMIN — AMOXICILLIN 500 MG: 400 POWDER, FOR SUSPENSION ORAL at 09:06

## 2024-06-25 RX ADMIN — PANTOPRAZOLE SODIUM 40 MG: 40 TABLET, DELAYED RELEASE ORAL at 08:06

## 2024-06-25 RX ADMIN — GABAPENTIN 100 MG: 100 CAPSULE ORAL at 09:06

## 2024-06-25 RX ADMIN — MIDODRINE HYDROCHLORIDE 10 MG: 5 TABLET ORAL at 09:06

## 2024-06-25 NOTE — PROGRESS NOTES
[facility] - Cardiology Stepdown  Heart Transplant  Progress Note    Patient Name: Radha Abbtot  MRN: [id number]  Admission Date: 6/11/2024  Hospital Length of Stay: 14 days  Attending Physician: Sajan Hurley, *  Primary Care Provider: Vasu Kong MD  Principal Problem:Acute renal failure superimposed on stage 4 chronic kidney disease    Subjective:   Interval History:   -tunneled line placed yesterday    Subjective:  -doing well, no questions for team    Continuous Infusions:  Scheduled Meds:   0.9% NaCl   Intravenous Once    amiodarone  400 mg Oral Daily    amoxicillin  500 mg Per NG tube Daily    atorvastatin  40 mg Oral QHS    epoetin zohaib (PROCRIT) injection  10,000 Units Intravenous Every Mon, Wed, Fri    folic acid  1 mg Oral Daily    gabapentin  100 mg Oral BID    LIDOcaine  2 patch Transdermal Q24H    midodrine  10 mg Oral Q8H    pantoprazole  40 mg Oral Daily    QUEtiapine  100 mg Oral QHS    sevelamer carbonate  0.8 g Oral TID WM    sodium bicarbonate  650 mg Oral TID    venlafaxine  37.5 mg Oral Daily    vitamin D  1,000 Units Oral Daily    warfarin  2.5 mg Oral Daily     PRN Meds:  Current Facility-Administered Medications:     0.9%  NaCl infusion (for blood administration), , Intravenous, Q24H PRN    0.9% NaCl, , Intravenous, PRN    acetaminophen, 650 mg, Oral, Q6H PRN    acetaminophen, 650 mg, Per NG tube, Q6H PRN    dextrose 10%, 12.5 g, Intravenous, PRN    dextrose 10%, 12.5 g, Intravenous, PRN    dextrose 10%, 25 g, Intravenous, PRN    dextrose 10%, 25 g, Intravenous, PRN    glucagon (human recombinant), 1 mg, Intramuscular, PRN    glucose, 16 g, Oral, PRN    glucose, 24 g, Oral, PRN    insulin aspart U-100, 0-10 Units, Subcutaneous, QID (AC + HS) PRN    simethicone, 1 tablet, Oral, TID PRN    traZODone, 25 mg, Oral, Nightly PRN    Review of patient's allergies indicates:  No Known Allergies  Objective:     Vital Signs (Most Recent):  Temp: 98.6 °F (37 °C) (06/25/24 1158)  Pulse: 80  (06/25/24 1502)  Resp: 18 (06/25/24 1158)  BP: (!) 81/65 (06/25/24 1159)  SpO2: 99 % (06/25/24 1158) Vital Signs (24h Range):  Temp:  [97.5 °F (36.4 °C)-98.6 °F (37 °C)] 98.6 °F (37 °C)  Pulse:  [70-86] 80  Resp:  [18] 18  SpO2:  [97 %-99 %] 99 %  BP: ()/(0-82) 81/65     Patient Vitals for the past 72 hrs (Last 3 readings):   Weight   06/25/24 0430 75.1 kg (165 lb 9.1 oz)   06/24/24 0800 72.8 kg (160 lb 7.9 oz)   06/24/24 0445 76 kg (167 lb 8.8 oz)     Body mass index is 21.84 kg/m².      Intake/Output Summary (Last 24 hours) at 6/25/2024 1524  Last data filed at 6/25/2024 1331  Gross per 24 hour   Intake 720 ml   Output --   Net 720 ml       Hemodynamic Parameters:       Telemetry: sinus rhythm, no events       Physical Exam  Constitutional:       Appearance: Normal appearance.   Cardiovascular:      Comments: VAD hum; JVP 8 cm  Pulmonary:      Effort: Pulmonary effort is normal.      Breath sounds: Normal breath sounds.   Musculoskeletal:      Right lower leg: No edema.      Left lower leg: No edema.   Skin:     General: Skin is warm.   Neurological:      Mental Status: He is alert.            Significant Labs:  CBC:  Recent Labs   Lab 06/23/24  0456 06/24/24  0533 06/25/24  0407   WBC 9.06 7.78 6.26   RBC 2.99* 2.71* 3.02*   HGB 8.3* 7.7* 8.3*   HCT 25.6* 23.3* 25.9*    395 426   MCV 86 86 86   MCH 27.8 28.4 27.5   MCHC 32.4 33.0 32.0     BNP:  Recent Labs   Lab 06/19/24  0312 06/21/24  0451 06/24/24  0533   * 1,328* 523*     CMP:  Recent Labs   Lab 06/19/24  0312 06/20/24  0308 06/21/24  0451 06/22/24  0232 06/22/24  0808 06/23/24  0456 06/24/24  0533 06/25/24  0407   *   < > 198*   < > 218* 72 89 134*   CALCIUM 8.3*   < > 8.7   < > 8.7 9.1 9.0 9.1   ALBUMIN 2.1*  --  2.1*  --  2.1*  --  2.0*  --    PROT 7.2  --  7.7  --   --   --  7.2  --    *   < > 134*   < > 134* 134* 136 135*   K 3.7   < > 4.0   < > 3.7 4.4 4.4 4.7   CO2 18*   < > 16*   < > 24 26 27 22*      < > 106   <  > 95 94* 93* 95   BUN 32*   < > 54*   < > 43* 65* 89* 107*   CREATININE 2.7*   < > 3.7*   < > 3.0* 5.0* 6.1* 7.1*   ALKPHOS 369*  --  387*  --   --   --  256*  --    *  --  104*  --   --   --  70*  --    *  --  67*  --   --   --  68*  --    BILITOT 0.5  --  0.4  --   --   --  0.5  --     < > = values in this interval not displayed.      Coagulation:   Recent Labs   Lab 06/23/24  0456 06/24/24  0533 06/25/24  0407   INR 2.0* 2.1* 2.2*   APTT 38.6* 40.0* 40.1*     LDH:  Recent Labs   Lab 06/23/24  0456 06/24/24  0533 06/25/24  0407   * 334* 313*     Microbiology:  Microbiology Results (last 7 days)       ** No results found for the last 168 hours. **            I have reviewed all pertinent labs within the past 24 hours.    Estimated Creatinine Clearance: 11.5 mL/min (A) (based on SCr of 7.1 mg/dL (H)).    Diagnostic Results:  I have reviewed and interpreted all pertinent imaging results/findings within the past 24 hours.  Assessment and Plan:     Mr. Radha Abbott is a 63 yo male with stage D HFrEF, ICMP who underwent HM3 placement as DT (12/1/23) w/ a hospital stay complicated by vasoplegia(requiring Giaprezza), debility, malnutrition/impaired swallowing, and renal failure requiring HD (since weaned off) w/ recent discharge for ADHF presented to the ED with wife due to c/o worsening generalized weakness since 6/10. Per wife patient was mostly sleeping during the day despite sleeping 10 hrs at night. Patient also admits decreased appetite and decreased urine output despite taking his medications as prescribed. He has not urinated since 6/11 at 2pm. He has chronic shortness of breath on exertion. Denies confusion, syncope, diarrhea, constipation, N/V, dysuria, or other complaints. Patient had his prior tunnel cath removed used for HD about 2 weeks ago. He completed a 6 week therapy for Enterococcus bacteremia with ampicillin and rocephin ending 5/30/24a and was switched to PO antibiotics.     On  prior hospital stay nephrology consulted during hospital stay due to elevated renal function. They feel he is close to being a dialysis patient. He diuresed on IV Bumex with prn Metolazone. He is net negative 1.2L throughout his hospital stay and weight on day of discharge was 179lbs down from 186lbs on admit. We continued his home dose of Bumex 4mg BID but increased his prn Metolazone to 10mg. He received a dose of Epo while inpatient and he has plans to follow up with Heme/Onc to get outpatient therapy arranged.      2D Echo with CFD done on 3/26/2024  LVAD: There is a Heartmate III LVAD running at 5000 RPM. The aortic valve does not open. The ventricular septum is at midline. Inflow cannula well seated at the apex. Outflow graft not visualized.   Left Ventricle: The left ventricle is moderately dilated. Normal wall thickness. Global hypokinesis present. Septal motion is abnormal. There is severely reduced systolic function with a visually estimated ejection fraction of 5 - 10%. There is diastolic dysfunction but grade cannot be determined.   Right Ventricle: Mild right ventricular enlargement. Wall thickness is normal. Right ventricle wall motion is normal. Systolic function is normal. Pacemaker lead present in the ventricle.   Left Atrium: Left atrium is severely dilated.   Right Atrium: Right atrium is moderately dilated.   Mitral Valve: The mitral valve is repaired with an Noble stitch. There is mild regurgitation.   IVC/SVC: Normal venous pressure at 3 mmHg.              * Acute renal failure superimposed on stage 4 chronic kidney disease  Mr. 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 Admitted for worsening renal function with a BUN>100/Cr. 9.9.     - CT Ab/pel: Kidneys/ Ureters: Normal in size and location.  Mild bilateral perinephric stranding, a  nonspecific finding and similar to prior.  No hydronephrosis or nephrolithiasis. No ureteral dilatation.  - tunneled line placed 6/24 for HD   - HD per nephro   - Will need OP HD upon discharge, recommend CM/SW involvement to start workup.  - Renally dosing medications      Aspiration pneumonia of both lungs  -CT chest and CXR concerning for pneumonia   -tolerating room air   - de-escalate to Amoxicillin     Bacteremia due to Enterococcus  He completed a 6 week therapy for Enterococcus bacteremia with ampicillin and rocephin ending 5/30/24a and was switched to Amoxicillin for suppression    Anticoagulant long-term use  Cont warfarin    Anemia  Chronic with no acute bleeding, multifactorial 2/2 CKD4 and chronic anticoagulation  -Iron panel done. Iron deficient but Ferritin is elevated at 1186  -Homocysteine normal and methylmalonic pending   -s/p Epo   -Replaced IV iron   -Continue to trend CBC's    Adjustment disorder with depressed mood  Cont SSRI    LVAD (left ventricular assist device) present  -HeartMate 3 Implanted  12/1/2023  as DT  -Cont Coumadin, dosing by PharmD.  Goal INR 2.0-3.0. therapeutic today.   -Antiplatelets Not on ASA  -LDH is stable overall today. Will continue to monitor daily.  -Speed set at  5000     -Interrogation notable for no events  -Not listed for OHTx, declined for OHTX due to comorbidities     Procedure: Device Interrogation Including analysis of device parameters  Current Settings: Ventricular Assist Device  Review of device function is stable        6/23/2024     8:18 AM 6/23/2024     4:42 AM 6/22/2024    11:00 PM 6/22/2024     7:45 PM 6/22/2024     4:15 PM 6/22/2024    12:15 PM 6/22/2024     8:26 AM   TXP LVAD INTERROGATIONS   Type HeartMate3 HeartMate3 HeartMate3 HeartMate3 HeartMate3 HeartMate3 HeartMate3   Flow 4.2 4.5 4.2 4.2 4.6 4.7 4.6   Speed 5000 5000 5000 5000 5000 5000 5000   PI 5 3.3 3 3.1 2.5 2.6 2.7   Power ([place]) 3.5 3.5 3.4 3.7 3.4 3.4 3.5   LSL 4600 4600 4600 4600  4600 4600 4600   Pulsatility  Intermittent pulse Intermittent pulse Intermittent pulse  Intermittent pulse Intermittent pulse         PAF (paroxysmal atrial fibrillation)  -Continue amiodarone and Coumadin      Type 2 diabetes mellitus without complication, without long-term current use of insulin  -Endocrine    Acute on chronic combined systolic and diastolic heart failure  Owing to his advanced kidney disease and significantly elevated creat, he is not on a  ACEI/ARB/ARNI or MRA. He is also not on a beta blocker due to RV dysfunction.     Plan:   -Volume removal with HD   - See LVAD  - See IVÁN    CAD (coronary artery disease)  -Continue statin        Mariann Bee MD  Heart Transplant  [facility] - Cardiology Stepdown

## 2024-06-25 NOTE — SUBJECTIVE & OBJECTIVE
Interval History:   -tunneled line placed yesterday    Subjective:  -doing well, no questions for team    Continuous Infusions:  Scheduled Meds:   0.9% NaCl   Intravenous Once    amiodarone  400 mg Oral Daily    amoxicillin  500 mg Per NG tube Daily    atorvastatin  40 mg Oral QHS    epoetin zohaib (PROCRIT) injection  10,000 Units Intravenous Every Mon, Wed, Fri    folic acid  1 mg Oral Daily    gabapentin  100 mg Oral BID    LIDOcaine  2 patch Transdermal Q24H    midodrine  10 mg Oral Q8H    pantoprazole  40 mg Oral Daily    QUEtiapine  100 mg Oral QHS    sevelamer carbonate  0.8 g Oral TID WM    sodium bicarbonate  650 mg Oral TID    venlafaxine  37.5 mg Oral Daily    vitamin D  1,000 Units Oral Daily    warfarin  2.5 mg Oral Daily     PRN Meds:  Current Facility-Administered Medications:     0.9%  NaCl infusion (for blood administration), , Intravenous, Q24H PRN    0.9% NaCl, , Intravenous, PRN    acetaminophen, 650 mg, Oral, Q6H PRN    acetaminophen, 650 mg, Per NG tube, Q6H PRN    dextrose 10%, 12.5 g, Intravenous, PRN    dextrose 10%, 12.5 g, Intravenous, PRN    dextrose 10%, 25 g, Intravenous, PRN    dextrose 10%, 25 g, Intravenous, PRN    glucagon (human recombinant), 1 mg, Intramuscular, PRN    glucose, 16 g, Oral, PRN    glucose, 24 g, Oral, PRN    insulin aspart U-100, 0-10 Units, Subcutaneous, QID (AC + HS) PRN    simethicone, 1 tablet, Oral, TID PRN    traZODone, 25 mg, Oral, Nightly PRN    Review of patient's allergies indicates:  No Known Allergies  Objective:     Vital Signs (Most Recent):  Temp: 98.6 °F (37 °C) (06/25/24 1158)  Pulse: 80 (06/25/24 1502)  Resp: 18 (06/25/24 1158)  BP: (!) 81/65 (06/25/24 1159)  SpO2: 99 % (06/25/24 1158) Vital Signs (24h Range):  Temp:  [97.5 °F (36.4 °C)-98.6 °F (37 °C)] 98.6 °F (37 °C)  Pulse:  [70-86] 80  Resp:  [18] 18  SpO2:  [97 %-99 %] 99 %  BP: ()/(0-82) 81/65     Patient Vitals for the past 72 hrs (Last 3 readings):   Weight   06/25/24 0430 75.1 kg (165  lb 9.1 oz)   06/24/24 0800 72.8 kg (160 lb 7.9 oz)   06/24/24 0445 76 kg (167 lb 8.8 oz)     Body mass index is 21.84 kg/m².      Intake/Output Summary (Last 24 hours) at 6/25/2024 1524  Last data filed at 6/25/2024 1331  Gross per 24 hour   Intake 720 ml   Output --   Net 720 ml       Hemodynamic Parameters:       Telemetry: sinus rhythm, no events       Physical Exam  Constitutional:       Appearance: Normal appearance.   Cardiovascular:      Comments: VAD hum; JVP 8 cm  Pulmonary:      Effort: Pulmonary effort is normal.      Breath sounds: Normal breath sounds.   Musculoskeletal:      Right lower leg: No edema.      Left lower leg: No edema.   Skin:     General: Skin is warm.   Neurological:      Mental Status: He is alert.            Significant Labs:  CBC:  Recent Labs   Lab 06/23/24  0456 06/24/24  0533 06/25/24  0407   WBC 9.06 7.78 6.26   RBC 2.99* 2.71* 3.02*   HGB 8.3* 7.7* 8.3*   HCT 25.6* 23.3* 25.9*    395 426   MCV 86 86 86   MCH 27.8 28.4 27.5   MCHC 32.4 33.0 32.0     BNP:  Recent Labs   Lab 06/19/24  0312 06/21/24  0451 06/24/24  0533   * 1,328* 523*     CMP:  Recent Labs   Lab 06/19/24  0312 06/20/24  0308 06/21/24  0451 06/22/24  0232 06/22/24  0808 06/23/24  0456 06/24/24  0533 06/25/24  0407   *   < > 198*   < > 218* 72 89 134*   CALCIUM 8.3*   < > 8.7   < > 8.7 9.1 9.0 9.1   ALBUMIN 2.1*  --  2.1*  --  2.1*  --  2.0*  --    PROT 7.2  --  7.7  --   --   --  7.2  --    *   < > 134*   < > 134* 134* 136 135*   K 3.7   < > 4.0   < > 3.7 4.4 4.4 4.7   CO2 18*   < > 16*   < > 24 26 27 22*      < > 106   < > 95 94* 93* 95   BUN 32*   < > 54*   < > 43* 65* 89* 107*   CREATININE 2.7*   < > 3.7*   < > 3.0* 5.0* 6.1* 7.1*   ALKPHOS 369*  --  387*  --   --   --  256*  --    *  --  104*  --   --   --  70*  --    *  --  67*  --   --   --  68*  --    BILITOT 0.5  --  0.4  --   --   --  0.5  --     < > = values in this interval not displayed.      Coagulation:    Recent Labs   Lab 06/23/24  0456 06/24/24  0533 06/25/24  0407   INR 2.0* 2.1* 2.2*   APTT 38.6* 40.0* 40.1*     LDH:  Recent Labs   Lab 06/23/24  0456 06/24/24  0533 06/25/24  0407   * 334* 313*     Microbiology:  Microbiology Results (last 7 days)       ** No results found for the last 168 hours. **            I have reviewed all pertinent labs within the past 24 hours.    Estimated Creatinine Clearance: 11.5 mL/min (A) (based on SCr of 7.1 mg/dL (H)).    Diagnostic Results:  I have reviewed and interpreted all pertinent imaging results/findings within the past 24 hours.

## 2024-06-25 NOTE — ASSESSMENT & PLAN NOTE
Patient with a baseline eGFR that appears to be variable however was noted to be in the 15-20 range in February before his recent IVÁN in May, presents now with fatigue and worsening IVÁN with a creatinine of 9.8 and eGFR of 5.5. BUN was noted to be elevated at 117, other electrolytes were normal. He was noted to have some asterixis on exam (which he states is chronic for him and unchanged from his baseline), however denies any other signs or symptoms of uremia. Bedside bladder scan revealed 200 mL of urine in his bladder and his last bladder void was 10 hours prior to this consult.     SLED initiated 6/13 x4 hours.  Mental status improved.      Recommendations:     -will continue 3x week dialysis   -HD today  -Na bicarb tabs 650 mg po TID  -Avoid nephrotoxic agents (IV contrast, NSAID's, gadolinium contrast, PPI's) if able.   -Strict I's and O's  -Renally dose all medications  -will need OP HD upon discharge, CM/SW involved  -TDC placed by IR 6/24

## 2024-06-25 NOTE — PLAN OF CARE
Unable to get accurate urine output d/t unmeasured occurrence with  one wet pad. VSS. Trialysis removed per order per protocol (see note). Spouse at bedside. BG monitored. LVAD number  and doppler WNL. LVAD dressing CDI. Pt educated on fall risk and remained free from falls/trauma/injury. Denies chest pain, SOB, palpitations, dizziness, pain, or discomfort. Plan of care reviewed with pt, all questions answered. Bed locked in lowest position, call bell within reach, no acute distress noted, will continue to monitor.

## 2024-06-25 NOTE — SUBJECTIVE & OBJECTIVE
Interval History:   NAEON.  TDC placed yesterday with IR.  Due for HD today.    Review of patient's allergies indicates:  No Known Allergies  Current Facility-Administered Medications   Medication Frequency    0.9%  NaCl infusion (for blood administration) Q24H PRN    0.9%  NaCl infusion PRN    0.9%  NaCl infusion Once    acetaminophen tablet 650 mg Q6H PRN    acetaminophen tablet 650 mg Q6H PRN    amiodarone tablet 400 mg Daily    amoxicillin 400 mg/5 mL suspension 500 mg Daily    atorvastatin tablet 40 mg QHS    dextrose 10% bolus 125 mL 125 mL PRN    dextrose 10% bolus 125 mL 125 mL PRN    dextrose 10% bolus 250 mL 250 mL PRN    dextrose 10% bolus 250 mL 250 mL PRN    epoetin zohaib injection 10,000 Units Every Mon, Wed, Fri    folic acid tablet 1 mg Daily    gabapentin capsule 100 mg BID    glucagon (human recombinant) injection 1 mg PRN    glucose chewable tablet 16 g PRN    glucose chewable tablet 24 g PRN    insulin aspart U-100 pen 0-10 Units QID (AC + HS) PRN    LIDOcaine 5 % patch 2 patch Q24H    midodrine tablet 10 mg Q8H    pantoprazole EC tablet 40 mg Daily    QUEtiapine tablet 100 mg QHS    simethicone chewable tablet 80 mg TID PRN    sodium bicarbonate tablet 650 mg TID    trazodone split tablet 25 mg Nightly PRN    venlafaxine tablet 37.5 mg Daily    vitamin D 1000 units tablet 1,000 Units Daily    warfarin (COUMADIN) tablet 2.5 mg Daily       Objective:     Vital Signs (Most Recent):  Temp: 98.6 °F (37 °C) (06/25/24 1158)  Pulse: 86 (06/25/24 1158)  Resp: 18 (06/25/24 1158)  BP: (!) 81/65 (06/25/24 1159)  SpO2: 99 % (06/25/24 1158) Vital Signs (24h Range):  Temp:  [97.5 °F (36.4 °C)-98.6 °F (37 °C)] 98.6 °F (37 °C)  Pulse:  [70-86] 86  Resp:  [15-18] 18  SpO2:  [97 %-100 %] 99 %  BP: ()/(0-82) 81/65     Weight: 75.1 kg (165 lb 9.1 oz) (06/25/24 0430)  Body mass index is 21.84 kg/m².  Body surface area is 1.97 meters squared.    I/O last 3 completed shifts:  In: 360 [P.O.:360]  Out: -       Physical Exam  Vitals and nursing note reviewed.   Constitutional:       General: He is sleeping.      Appearance: Normal appearance.   HENT:      Head: Normocephalic and atraumatic.   Eyes:      Conjunctiva/sclera: Conjunctivae normal.      Pupils: Pupils are equal, round, and reactive to light.   Neck:      Comments: RIJ trialysis cath.   Cardiovascular:      Rate and Rhythm: Normal rate and regular rhythm.      Comments: Smooth VAD hum  Pulmonary:      Effort: Pulmonary effort is normal. No respiratory distress.      Breath sounds: Normal breath sounds. No wheezing or rales.   Abdominal:      General: Bowel sounds are normal.      Palpations: Abdomen is soft.   Musculoskeletal:         General: No swelling. Normal range of motion.      Cervical back: Normal range of motion and neck supple.      Right lower leg: No edema.      Left lower leg: No edema.   Skin:     General: Skin is warm and dry.   Neurological:      General: No focal deficit present.          Significant Labs:  CBC:   Recent Labs   Lab 06/25/24  0407   WBC 6.26   RBC 3.02*   HGB 8.3*   HCT 25.9*      MCV 86   MCH 27.5   MCHC 32.0     CMP:   Recent Labs   Lab 06/24/24  0533 06/25/24  0407   GLU 89 134*   CALCIUM 9.0 9.1   ALBUMIN 2.0*  --    PROT 7.2  --     135*   K 4.4 4.7   CO2 27 22*   CL 93* 95   BUN 89* 107*   CREATININE 6.1* 7.1*   ALKPHOS 256*  --    ALT 70*  --    AST 68*  --    BILITOT 0.5  --

## 2024-06-25 NOTE — PROGRESS NOTES
06/25/2024  Erasmo Hooper    Current provider:  Sajan Hurley, *    Device interrogation:      6/25/2024     8:00 AM 6/25/2024     6:35 AM 6/24/2024    11:07 PM 6/24/2024     7:06 PM 6/24/2024     4:00 PM 6/24/2024     3:15 PM 6/24/2024     1:49 PM   TXP LVAD INTERROGATIONS   Type HeartMate3 HeartMate3 HeartMate3 HeartMate3 HeartMate3 HeartMate3 HeartMate3   Flow 4.3 4.4 4.2 4.4 3.9 3.6 4   Speed 5000 5000 5000 5050 5000 5000 5000   PI 3.6 3.8 4.4 3.3 6 6.8 5.5   Power ([place]) 3.5 3.4 3.3 3.5 3.4 3.5 3.5   LSL 4600 4600 4600 4600 4600 4600    Pulsatility Pulse Intermittent pulse Pulse Pulse Pulse Intermittent pulse           Rounded on [facility] to ensure all mechanical assist device settings (IABP or VAD) were appropriate and all parameters were within limits.  I was able to ensure all back up equipment was present, the staff had no issues, and the Perfusion Department daily rounding was complete.      For implantable VADs: Interrogation of Ventricular assist device was performed with analysis of device parameters and review of device function. I have personally reviewed the interrogation findings and agree with findings as stated.     In emergency, the nursing units have been notified to contact the perfusion department either by:  Calling [phone] from 630am to 4pm Mon thru Fri, utilizing the On-Call Finder functionality of Epic and searching for Perfusion, or by contacting the hospital  from 4pm to 630am and on weekends and asking to speak with the perfusionist on call.    8:08 AM

## 2024-06-25 NOTE — PROGRESS NOTES
Follow up note:   Heart Transplant  spoke with the pt by phone for follow up.  Pt reports he's coping well with the hospital stay and understands need for out pt dialysis.  Transplant Social Workers are working on discharge planning.  Pt reports no additional needs or concerns at this time.

## 2024-06-25 NOTE — TREATMENT PLAN
Dispo planning:    LVAD new to iHD  TDC placed 6/24/24  Patient currently T/TH/S, will need to transition to MWF prior to dc for rehab purposes    PT/OT recs IPR, will need OP HD chair or at least facility acceptance prior     [place] [facility] [place]

## 2024-06-25 NOTE — NURSING
Notified Rohit.MD no central line  access for CVP or  SV O2 d/t trialysis cath was removed yesterday,and new permacath is for dialysis, Rohit.MD agree to discontinue CVP or SV O2 order. Pt refused standing weight.

## 2024-06-25 NOTE — PROCEDURES
"Radha Abbott is a 64 y.o. male patient.    Temp: 97.5 °F (36.4 °C) (06/24/24 1906)  Pulse: 70 (06/24/24 1906)  Resp: 18 (06/24/24 1906)  BP: (!) (P) 76/0 (06/24/24 1906)  SpO2: 98 % (06/24/24 1906)  Weight: 72.8 kg (160 lb 7.9 oz) (06/24/24 0800)  Height: 6' 1" (185.4 cm) (06/18/24 1357)    Procedures        6/24/2024     4:00 PM 6/24/2024     3:15 PM 6/24/2024     1:49 PM 6/24/2024    12:00 PM 6/24/2024     8:00 AM 6/24/2024    12:00 AM 6/23/2024    10:53 PM   TXP LVAD INTERROGATIONS   Type HeartMate3 HeartMate3 HeartMate3 HeartMate3 HeartMate3 HeartMate3 HeartMate3   Flow 3.9 3.6 4 4 4.1  4.3   Speed 5000 5000 5000 5000 5000  5000   PI 6 6.8 5.5 5.6 4.4  3.8   Power ([place]rington) 3.4 3.5 3.5 3.4 3.4  3.4   LSL 4600 4600  4600 4600  4600   Pulsatility Pulse Intermittent pulse  Pulse Intermittent pulse  Intermittent pulse     Interrogation of Ventricular assist device was performed with physician analysis of device parameters and review of device function. I have personally reviewed the interrogation findings and agree with findings as stated.       6/24/2024    "

## 2024-06-25 NOTE — PROGRESS NOTES
[facility] - Cardiology Stepdown  Nephrology  Progress Note    Patient Name: Radha Abbott  MRN: [id number]  Admission Date: 6/11/2024  Hospital Length of Stay: 14 days  Attending Provider: Sajan Hurley, *   Primary Care Physician: Vasu Kong MD  Principal Problem:Acute renal failure superimposed on stage 4 chronic kidney disease    Subjective:     HPI: Mr. Abbott is a 64-year-old man with ischemic cardiomyopathy with most recent TTE showing EF 10 -15% and G3DD s/p [employer] ICM placement previously on chronic dobutamine infusion however now with Heartmate III LVAD placed on 12/01/2023, CAD s/p x3 vessel CABG back in 2009, type 2 diabetes mellitus (most recent hemoglobin A1c 7.4%), hypertension, HLD, history of ventricular fibrillation for which he is on amiodarone, chronic AC with Coumadin, advanced CKD V (baseline creatinine ~6) with recurrent AKIs and previous dialysis dependence (tunneled HD catheter removed in early March of this year). He had a recent hospitalization where he was treated for fluid overload acute on chronic CHF exacerbation and IVÁN thought to be 2/2 type 2 CRS and was subsequently diuresed and discharged with a creatinine of 6.2. He presents to the hospital on 6/11 for concerns of worsening fatigue that started one day prior to him arriving to the hospital, and has had difficulties getting out of bed. He has also noted a decrease in his urine output over the past 36 hours despite Diuretic use. He admits adherence to his Metolazone and Bumex at home, however has not passed any urine in the past 10 hours. Bed side bladder scan revealed 200 mL of urine in his bladder. Upon arrival to the ER, his is hemodynamically stable, Labs showed Hgb of 6.3, electrolytes within acceptable limits, Serum CO2 of 21, BUN of 118, and a creatinine of 9.8. He denies feeling fluid overloaded or short of breath, reports good appetite, denies nausea, vomiting, or metallic taste in his mouth. Chest xray  showed mild central vascular congestion and interstitial pulmonary opacities which appear improved compared to previous studies. CT abdomen/Pelvis without contrast revealed Kidneys that were reported as normal in size and location, and without hydronephrosis or ureteral dilation.     Interval History:   NAEON.  TDC placed yesterday with IR.  Due for HD today.    Review of patient's allergies indicates:  No Known Allergies  Current Facility-Administered Medications   Medication Frequency    0.9%  NaCl infusion (for blood administration) Q24H PRN    0.9%  NaCl infusion PRN    0.9%  NaCl infusion Once    acetaminophen tablet 650 mg Q6H PRN    acetaminophen tablet 650 mg Q6H PRN    amiodarone tablet 400 mg Daily    amoxicillin 400 mg/5 mL suspension 500 mg Daily    atorvastatin tablet 40 mg QHS    dextrose 10% bolus 125 mL 125 mL PRN    dextrose 10% bolus 125 mL 125 mL PRN    dextrose 10% bolus 250 mL 250 mL PRN    dextrose 10% bolus 250 mL 250 mL PRN    epoetin zohaib injection 10,000 Units Every Mon, Wed, Fri    folic acid tablet 1 mg Daily    gabapentin capsule 100 mg BID    glucagon (human recombinant) injection 1 mg PRN    glucose chewable tablet 16 g PRN    glucose chewable tablet 24 g PRN    insulin aspart U-100 pen 0-10 Units QID (AC + HS) PRN    LIDOcaine 5 % patch 2 patch Q24H    midodrine tablet 10 mg Q8H    pantoprazole EC tablet 40 mg Daily    QUEtiapine tablet 100 mg QHS    simethicone chewable tablet 80 mg TID PRN    sodium bicarbonate tablet 650 mg TID    trazodone split tablet 25 mg Nightly PRN    venlafaxine tablet 37.5 mg Daily    vitamin D 1000 units tablet 1,000 Units Daily    warfarin (COUMADIN) tablet 2.5 mg Daily       Objective:     Vital Signs (Most Recent):  Temp: 98.6 °F (37 °C) (06/25/24 1158)  Pulse: 86 (06/25/24 1158)  Resp: 18 (06/25/24 1158)  BP: (!) 81/65 (06/25/24 1159)  SpO2: 99 % (06/25/24 1158) Vital Signs (24h Range):  Temp:  [97.5 °F (36.4 °C)-98.6 °F (37 °C)] 98.6 °F (37  °C)  Pulse:  [70-86] 86  Resp:  [15-18] 18  SpO2:  [97 %-100 %] 99 %  BP: ()/(0-82) 81/65     Weight: 75.1 kg (165 lb 9.1 oz) (06/25/24 0430)  Body mass index is 21.84 kg/m².  Body surface area is 1.97 meters squared.    I/O last 3 completed shifts:  In: 360 [P.O.:360]  Out: -      Physical Exam  Vitals and nursing note reviewed.   Constitutional:       General: He is sleeping.      Appearance: Normal appearance.   HENT:      Head: Normocephalic and atraumatic.   Eyes:      Conjunctiva/sclera: Conjunctivae normal.      Pupils: Pupils are equal, round, and reactive to light.   Neck:      Comments: RIJ trialysis cath.   Cardiovascular:      Rate and Rhythm: Normal rate and regular rhythm.      Comments: Smooth VAD hum  Pulmonary:      Effort: Pulmonary effort is normal. No respiratory distress.      Breath sounds: Normal breath sounds. No wheezing or rales.   Abdominal:      General: Bowel sounds are normal.      Palpations: Abdomen is soft.   Musculoskeletal:         General: No swelling. Normal range of motion.      Cervical back: Normal range of motion and neck supple.      Right lower leg: No edema.      Left lower leg: No edema.   Skin:     General: Skin is warm and dry.   Neurological:      General: No focal deficit present.          Significant Labs:  CBC:   Recent Labs   Lab 06/25/24  0407   WBC 6.26   RBC 3.02*   HGB 8.3*   HCT 25.9*      MCV 86   MCH 27.5   MCHC 32.0     CMP:   Recent Labs   Lab 06/24/24  0533 06/25/24  0407   GLU 89 134*   CALCIUM 9.0 9.1   ALBUMIN 2.0*  --    PROT 7.2  --     135*   K 4.4 4.7   CO2 27 22*   CL 93* 95   BUN 89* 107*   CREATININE 6.1* 7.1*   ALKPHOS 256*  --    ALT 70*  --    AST 68*  --    BILITOT 0.5  --         Assessment/Plan:     Renal/  * Acute renal failure superimposed on stage 4 chronic kidney disease  Patient with a baseline eGFR that appears to be variable however was noted to be in the 15-20 range in February before his recent IVÁN in May,  presents now with fatigue and worsening IVÁN with a creatinine of 9.8 and eGFR of 5.5. BUN was noted to be elevated at 117, other electrolytes were normal. He was noted to have some asterixis on exam (which he states is chronic for him and unchanged from his baseline), however denies any other signs or symptoms of uremia. Bedside bladder scan revealed 200 mL of urine in his bladder and his last bladder void was 10 hours prior to this consult.     SLED initiated 6/13 x4 hours.  Mental status improved.      Recommendations:     -will continue 3x week dialysis   -HD today  -Na bicarb tabs 650 mg po TID  -Avoid nephrotoxic agents (IV contrast, NSAID's, gadolinium contrast, PPI's) if able.   -Strict I's and O's  -Renally dose all medications  -will need OP HD upon discharge, CM/SW involved  -TDC placed by IR 6/24    Chronic kidney disease-mineral and bone disorder    -low calcium bath  -phos binders with meals  -low phos diet    Oncology  Anemia  -EPO with HD  -Feraheme 6/19; 2nd dose today        Norm Vidal, NP  Nephrology  [facility] - Cardiology Stepdown

## 2024-06-25 NOTE — NURSING
Pt's tunneled cath dressing changed per protocol using kit and sterile technique. Pt's drive line site is moist with moderate bloody drainage. No active bleeding noted after dressing changed. Pt tolerated well. Next dressing change due 07/01/2024.

## 2024-06-25 NOTE — NURSING
Trialysis catheter removed per  order per protocol. Pressure and dressing applied, pt tolerated well. No active bleeding noted after dressing applied.

## 2024-06-25 NOTE — CARE UPDATE
BG goal 140-180    -A1C   Lab Results   Component Value Date    HGBA1C 7.4 (H) 04/17/2024       -HOME REGIMEN: Amaryl 1 mg (recently prescribed by PCP about a week ago)        -INPATIENT REGIMEN: Novolog Correction Scale     -GLUCOSE TREND FOR THE PAST 24HRS: 124-196    -NO HYPOGYCEMIAS NOTED       -Diet: Diet Soft & Bite Sized (IDDSI Level 6) Fluid - 1500mL; Thin      -Steroids -  n/a     -Tube Feeds -  off and NG tube pulled on 6/22      Remains in CSU. BG stable with prn SQ insulin correction scale. Creatinine 7.1; HD.       Plan:  -Continue Moderate Dose Correction Scale   -BG monitoring ac/hs  -Hypoglycemia protocol in place    ** Please call Endocrine for any BG related issues **     Discharge planning:tbd    Endocrine to continue to follow    ** Please call Endocrine for any BG related issues **

## 2024-06-25 NOTE — PT/OT/SLP PROGRESS
Speech Language Pathology Treatment/ Discharge Summary     Patient Name:  Radha Abbott   MRN:  [id number]  Admitting Diagnosis: Acute renal failure superimposed on stage 4 chronic kidney disease    Recommendations:                 General Recommendations:  Dysphagia therapy  Diet recommendations:  Regular Diet - IDDSI Level 7, Liquid Diet Level: Thin liquids - IDDSI Level 0   Aspiration Precautions: Standard aspiration precautions   General Precautions: Standard, LVAD, fall  Communication strategies:  none    Assessment:     Radha Abbott is a 64 y.o. male with an SLP diagnosis of Dysphagia.     Subjective     Awake/alert    Pain/Comfort:  Pain Rating 1: 0/10  Pain Rating Post-Intervention 1: 0/10    Respiratory Status: Room air    Objective:     Has the patient been evaluated by SLP for swallowing?   Yes  Keep patient NPO? No     Pt repositioned upright in bed for PO trials. Upon arrival pt consuming manav & ikes with adequate chewing skills. No overt clinical signs of aspiration.  Pt with good strong vocal quality.  Regular diet and thin liquids appearing appropriate without need for skilled speech therapy services in the acute care stetting.         Goals:   Multidisciplinary Problems       SLP Goals          Problem: SLP    Goal Priority Disciplines Outcome   SLP Goal     SLP Progressing   Description: Speech Language Pathology Goals  Goals expected to be met by    1. Pt will tolerate regular unrestricted diet without any overt clinical signs of aspiration                        Plan:     Patient to be seen:  4 x/week   Plan of Care expires:     Plan of Care reviewed with:  patient, spouse   SLP Follow-Up:  No       Discharge recommendations:  High Intensity Therapy     Time Tracking:     SLP Treatment Date:   06/25/24  Speech Start Time:  1517  Speech Stop Time:  1528     Speech Total Time (min):  11 min    Billable Minutes: Treatment Swallowing Dysfunction 11    06/25/2024

## 2024-06-26 PROBLEM — J69.0 ASPIRATION PNEUMONIA OF BOTH LUNGS: Status: RESOLVED | Noted: 2024-06-17 | Resolved: 2024-06-26

## 2024-06-26 LAB
ALBUMIN SERPL BCP-MCNC: 2 G/DL (ref 3.5–5.2)
ALP SERPL-CCNC: 245 U/L (ref 55–135)
ALT SERPL W/O P-5'-P-CCNC: 55 U/L (ref 10–44)
ANION GAP SERPL CALC-SCNC: 13 MMOL/L (ref 8–16)
APTT PPP: 43 SEC (ref 21–32)
AST SERPL-CCNC: 64 U/L (ref 10–40)
BASOPHILS # BLD AUTO: 0.03 K/UL (ref 0–0.2)
BASOPHILS NFR BLD: 0.4 % (ref 0–1.9)
BILIRUB DIRECT SERPL-MCNC: 0.2 MG/DL (ref 0.1–0.3)
BILIRUB SERPL-MCNC: 0.5 MG/DL (ref 0.1–1)
BNP SERPL-MCNC: 598 PG/ML (ref 0–99)
BUN SERPL-MCNC: 42 MG/DL (ref 8–23)
CALCIUM SERPL-MCNC: 8.4 MG/DL (ref 8.7–10.5)
CHLORIDE SERPL-SCNC: 101 MMOL/L (ref 95–110)
CO2 SERPL-SCNC: 25 MMOL/L (ref 23–29)
CREAT SERPL-MCNC: 4 MG/DL (ref 0.5–1.4)
CRP SERPL-MCNC: 99.1 MG/L (ref 0–8.2)
DIFFERENTIAL METHOD BLD: ABNORMAL
EOSINOPHIL # BLD AUTO: 0.2 K/UL (ref 0–0.5)
EOSINOPHIL NFR BLD: 2.3 % (ref 0–8)
ERYTHROCYTE [DISTWIDTH] IN BLOOD BY AUTOMATED COUNT: 18.3 % (ref 11.5–14.5)
EST. GFR  (NO RACE VARIABLE): 16.1 ML/MIN/1.73 M^2
GLUCOSE SERPL-MCNC: 93 MG/DL (ref 70–110)
HAV IGM SERPL QL IA: NORMAL
HBV CORE AB SERPL QL IA: NORMAL
HBV SURFACE AB SER-ACNC: 353.11 MIU/ML
HBV SURFACE AB SER-ACNC: REACTIVE M[IU]/ML
HBV SURFACE AG SERPL QL IA: NORMAL
HCT VFR BLD AUTO: 24 % (ref 40–54)
HGB BLD-MCNC: 7.5 G/DL (ref 14–18)
IMM GRANULOCYTES # BLD AUTO: 0.1 K/UL (ref 0–0.04)
IMM GRANULOCYTES NFR BLD AUTO: 1.3 % (ref 0–0.5)
INR PPP: 2.4 (ref 0.8–1.2)
LDH SERPL L TO P-CCNC: 382 U/L (ref 110–260)
LYMPHOCYTES # BLD AUTO: 1.2 K/UL (ref 1–4.8)
LYMPHOCYTES NFR BLD: 16.1 % (ref 18–48)
MAGNESIUM SERPL-MCNC: 2.1 MG/DL (ref 1.6–2.6)
MCH RBC QN AUTO: 27.6 PG (ref 27–31)
MCHC RBC AUTO-ENTMCNC: 31.3 G/DL (ref 32–36)
MCV RBC AUTO: 88 FL (ref 82–98)
MONOCYTES # BLD AUTO: 0.9 K/UL (ref 0.3–1)
MONOCYTES NFR BLD: 11.3 % (ref 4–15)
NEUTROPHILS # BLD AUTO: 5.2 K/UL (ref 1.8–7.7)
NEUTROPHILS NFR BLD: 68.6 % (ref 38–73)
NRBC BLD-RTO: 0 /100 WBC
PHOSPHATE SERPL-MCNC: 4 MG/DL (ref 2.7–4.5)
PLATELET # BLD AUTO: 460 K/UL (ref 150–450)
PMV BLD AUTO: 10.4 FL (ref 9.2–12.9)
POCT GLUCOSE: 154 MG/DL (ref 70–110)
POCT GLUCOSE: 155 MG/DL (ref 70–110)
POCT GLUCOSE: 162 MG/DL (ref 70–110)
POCT GLUCOSE: 229 MG/DL (ref 70–110)
POTASSIUM SERPL-SCNC: 4.1 MMOL/L (ref 3.5–5.1)
PREALB SERPL-MCNC: 17 MG/DL (ref 20–43)
PROT SERPL-MCNC: 7.3 G/DL (ref 6–8.4)
PROTHROMBIN TIME: 24.6 SEC (ref 9–12.5)
RBC # BLD AUTO: 2.72 M/UL (ref 4.6–6.2)
SODIUM SERPL-SCNC: 139 MMOL/L (ref 136–145)
WBC # BLD AUTO: 7.52 K/UL (ref 3.9–12.7)

## 2024-06-26 PROCEDURE — 25000003 PHARM REV CODE 250: Performed by: PHYSICIAN ASSISTANT

## 2024-06-26 PROCEDURE — 25000003 PHARM REV CODE 250: Performed by: NURSE PRACTITIONER

## 2024-06-26 PROCEDURE — 80048 BASIC METABOLIC PNL TOTAL CA: CPT | Performed by: STUDENT IN AN ORGANIZED HEALTH CARE EDUCATION/TRAINING PROGRAM

## 2024-06-26 PROCEDURE — 20600001 HC STEP DOWN PRIVATE ROOM

## 2024-06-26 PROCEDURE — 99233 SBSQ HOSP IP/OBS HIGH 50: CPT | Mod: ,,, | Performed by: INTERNAL MEDICINE

## 2024-06-26 PROCEDURE — 83615 LACTATE (LD) (LDH) ENZYME: CPT | Performed by: STUDENT IN AN ORGANIZED HEALTH CARE EDUCATION/TRAINING PROGRAM

## 2024-06-26 PROCEDURE — 86709 HEPATITIS A IGM ANTIBODY: CPT | Performed by: INTERNAL MEDICINE

## 2024-06-26 PROCEDURE — 85025 COMPLETE CBC W/AUTO DIFF WBC: CPT | Performed by: STUDENT IN AN ORGANIZED HEALTH CARE EDUCATION/TRAINING PROGRAM

## 2024-06-26 PROCEDURE — 85610 PROTHROMBIN TIME: CPT | Performed by: STUDENT IN AN ORGANIZED HEALTH CARE EDUCATION/TRAINING PROGRAM

## 2024-06-26 PROCEDURE — 36415 COLL VENOUS BLD VENIPUNCTURE: CPT | Mod: XB | Performed by: INTERNAL MEDICINE

## 2024-06-26 PROCEDURE — 25000003 PHARM REV CODE 250: Performed by: STUDENT IN AN ORGANIZED HEALTH CARE EDUCATION/TRAINING PROGRAM

## 2024-06-26 PROCEDURE — 93750 INTERROGATION VAD IN PERSON: CPT | Mod: ,,, | Performed by: INTERNAL MEDICINE

## 2024-06-26 PROCEDURE — 84100 ASSAY OF PHOSPHORUS: CPT | Performed by: STUDENT IN AN ORGANIZED HEALTH CARE EDUCATION/TRAINING PROGRAM

## 2024-06-26 PROCEDURE — 99222 1ST HOSP IP/OBS MODERATE 55: CPT | Mod: ,,, | Performed by: NURSE PRACTITIONER

## 2024-06-26 PROCEDURE — 36415 COLL VENOUS BLD VENIPUNCTURE: CPT | Performed by: NURSE PRACTITIONER

## 2024-06-26 PROCEDURE — 27000248 HC VAD-ADDITIONAL DAY

## 2024-06-26 PROCEDURE — 63600175 PHARM REV CODE 636 W HCPCS: Mod: JZ | Performed by: NURSE PRACTITIONER

## 2024-06-26 PROCEDURE — 97530 THERAPEUTIC ACTIVITIES: CPT | Mod: CQ

## 2024-06-26 PROCEDURE — 85730 THROMBOPLASTIN TIME PARTIAL: CPT | Performed by: STUDENT IN AN ORGANIZED HEALTH CARE EDUCATION/TRAINING PROGRAM

## 2024-06-26 PROCEDURE — 97535 SELF CARE MNGMENT TRAINING: CPT

## 2024-06-26 PROCEDURE — 83880 ASSAY OF NATRIURETIC PEPTIDE: CPT | Performed by: STUDENT IN AN ORGANIZED HEALTH CARE EDUCATION/TRAINING PROGRAM

## 2024-06-26 PROCEDURE — 25000003 PHARM REV CODE 250: Performed by: INTERNAL MEDICINE

## 2024-06-26 PROCEDURE — 83735 ASSAY OF MAGNESIUM: CPT | Performed by: STUDENT IN AN ORGANIZED HEALTH CARE EDUCATION/TRAINING PROGRAM

## 2024-06-26 PROCEDURE — 36415 COLL VENOUS BLD VENIPUNCTURE: CPT | Performed by: STUDENT IN AN ORGANIZED HEALTH CARE EDUCATION/TRAINING PROGRAM

## 2024-06-26 PROCEDURE — 86706 HEP B SURFACE ANTIBODY: CPT | Mod: 91 | Performed by: INTERNAL MEDICINE

## 2024-06-26 PROCEDURE — 84134 ASSAY OF PREALBUMIN: CPT | Performed by: STUDENT IN AN ORGANIZED HEALTH CARE EDUCATION/TRAINING PROGRAM

## 2024-06-26 PROCEDURE — 87340 HEPATITIS B SURFACE AG IA: CPT | Performed by: NURSE PRACTITIONER

## 2024-06-26 PROCEDURE — 86140 C-REACTIVE PROTEIN: CPT | Performed by: STUDENT IN AN ORGANIZED HEALTH CARE EDUCATION/TRAINING PROGRAM

## 2024-06-26 PROCEDURE — 25000003 PHARM REV CODE 250: Performed by: REGISTERED NURSE

## 2024-06-26 PROCEDURE — 80076 HEPATIC FUNCTION PANEL: CPT | Performed by: STUDENT IN AN ORGANIZED HEALTH CARE EDUCATION/TRAINING PROGRAM

## 2024-06-26 PROCEDURE — 86704 HEP B CORE ANTIBODY TOTAL: CPT | Performed by: INTERNAL MEDICINE

## 2024-06-26 RX ADMIN — GABAPENTIN 100 MG: 100 CAPSULE ORAL at 08:06

## 2024-06-26 RX ADMIN — WARFARIN SODIUM 2.5 MG: 2.5 TABLET ORAL at 05:06

## 2024-06-26 RX ADMIN — VENLAFAXINE 37.5 MG: 37.5 TABLET ORAL at 08:06

## 2024-06-26 RX ADMIN — SODIUM BICARBONATE 650 MG TABLET 650 MG: at 09:06

## 2024-06-26 RX ADMIN — SODIUM BICARBONATE 650 MG TABLET 650 MG: at 08:06

## 2024-06-26 RX ADMIN — QUETIAPINE FUMARATE 100 MG: 25 TABLET ORAL at 09:06

## 2024-06-26 RX ADMIN — INSULIN ASPART 2 UNITS: 100 INJECTION, SOLUTION INTRAVENOUS; SUBCUTANEOUS at 08:06

## 2024-06-26 RX ADMIN — MIDODRINE HYDROCHLORIDE 10 MG: 5 TABLET ORAL at 05:06

## 2024-06-26 RX ADMIN — ERYTHROPOIETIN 10000 UNITS: 10000 INJECTION, SOLUTION INTRAVENOUS; SUBCUTANEOUS at 02:06

## 2024-06-26 RX ADMIN — SEVELAMER CARBONATE 0.8 G: 800 POWDER, FOR SUSPENSION ORAL at 05:06

## 2024-06-26 RX ADMIN — MIDODRINE HYDROCHLORIDE 10 MG: 5 TABLET ORAL at 09:06

## 2024-06-26 RX ADMIN — FOLIC ACID 1 MG: 1 TABLET ORAL at 08:06

## 2024-06-26 RX ADMIN — ATORVASTATIN CALCIUM 40 MG: 40 TABLET, FILM COATED ORAL at 09:06

## 2024-06-26 RX ADMIN — SEVELAMER CARBONATE 0.8 G: 800 POWDER, FOR SUSPENSION ORAL at 12:06

## 2024-06-26 RX ADMIN — SEVELAMER CARBONATE 0.8 G: 800 POWDER, FOR SUSPENSION ORAL at 08:06

## 2024-06-26 RX ADMIN — SODIUM BICARBONATE 650 MG TABLET 650 MG: at 03:06

## 2024-06-26 RX ADMIN — AMOXICILLIN 500 MG: 400 POWDER, FOR SUSPENSION ORAL at 09:06

## 2024-06-26 RX ADMIN — INSULIN ASPART 2 UNITS: 100 INJECTION, SOLUTION INTRAVENOUS; SUBCUTANEOUS at 12:06

## 2024-06-26 RX ADMIN — GABAPENTIN 100 MG: 100 CAPSULE ORAL at 09:06

## 2024-06-26 RX ADMIN — CHOLECALCIFEROL TAB 25 MCG (1000 UNIT) 1000 UNITS: 25 TAB at 08:06

## 2024-06-26 RX ADMIN — MIDODRINE HYDROCHLORIDE 10 MG: 5 TABLET ORAL at 03:06

## 2024-06-26 RX ADMIN — AMIODARONE HYDROCHLORIDE 400 MG: 200 TABLET ORAL at 08:06

## 2024-06-26 RX ADMIN — INSULIN ASPART 2 UNITS: 100 INJECTION, SOLUTION INTRAVENOUS; SUBCUTANEOUS at 05:06

## 2024-06-26 RX ADMIN — PANTOPRAZOLE SODIUM 40 MG: 40 TABLET, DELAYED RELEASE ORAL at 08:06

## 2024-06-26 NOTE — ASSESSMENT & PLAN NOTE
-HeartMate 3 Implanted  12/1/2023  as DT  -Cont Coumadin, dosing by PharmD.  Goal INR 2.0-3.0. therapeutic today.   -Antiplatelets Not on ASA  -LDH is stable overall today. Will continue to monitor daily.  -Speed set at  5000     -Interrogation notable for no events  -Not listed for OHTx, declined for OHTX due to comorbidities     Procedure: Device Interrogation Including analysis of device parameters  Current Settings: Ventricular Assist Device  Review of device function is stable        6/26/2024     3:46 PM 6/26/2024    12:00 PM 6/26/2024     8:00 AM 6/26/2024     5:25 AM 6/25/2024    11:32 PM 6/25/2024     8:18 PM 6/25/2024     4:00 PM   TXP LVAD INTERROGATIONS   Type HeartMate3 HeartMate3 HeartMate3 HeartMate3 HeartMate3 HeartMate3 HeartMate3   Flow 4.2 3.9 4.1 4.2 3.9 4.1 4   Speed 5000 5000 5000 5050 5000 5000 5000   PI 4.2 4.7 4.1 3.7 4.8 4.1 4.5   Power ([place]rington) 3.4 3.5 3.5 3.4 3.4 3.3 3.5   LSL 4600 4600 4600 4600 4600 4600 4600   Low Flow Alarm none none none no      High Power Alarm none none none no      Pulsatility Pulse Pulse Pulse Intermittent pulse Intermittent pulse Intermittent pulse Intermittent pulse

## 2024-06-26 NOTE — PT/OT/SLP PROGRESS
Occupational Therapy      Patient Name:  Radha Abbott   MRN:  [id number]    Patient not seen today secondary to Unarousable. Will follow-up per POC.    6/26/2024

## 2024-06-26 NOTE — CONSULTS
Inpatient consult to Physical Medicine Rehab  Consult performed by: Layne Ramirez NP  Consult ordered by: Sajan Hurley MD  Reason for consult: Rehab      Consult received.     CAROLINA Griffiths, FNP-C  Physical Medicine & Rehabilitation   06/26/2024

## 2024-06-26 NOTE — PROGRESS NOTES
06/26/2024  [place] Duarte    Current provider:  Juventino Bermudez Jr.*    Device interrogation:      6/26/2024    12:00 PM 6/26/2024     8:00 AM 6/26/2024     5:25 AM 6/25/2024    11:32 PM 6/25/2024     8:18 PM 6/25/2024     4:00 PM 6/25/2024    11:59 AM   TXP LVAD INTERROGATIONS   Type HeartMate3 HeartMate3 HeartMate3 HeartMate3 HeartMate3 HeartMate3 HeartMate3   Flow 3.9 4.1 4.2 3.9 4.1 4 4.3   Speed 5000 5000 5050 5000 5000 5000 5000   PI 4.7 4.1 3.7 4.8 4.1 4.5 3.9   Power ([place]) 3.5 3.5 3.4 3.4 3.3 3.5 3.3   LSL 4600 4600 4600 4600 4600 4600 4600   Low Flow Alarm none none no       High Power Alarm none none no       Pulsatility Pulse Pulse Intermittent pulse Intermittent pulse Intermittent pulse Intermittent pulse Pulse          Rounded on [facility] to ensure all mechanical assist device settings (IABP or VAD) were appropriate and all parameters were within limits.  I was able to ensure all back up equipment was present, the staff had no issues, and the Perfusion Department daily rounding was complete.      For implantable VADs: Interrogation of Ventricular assist device was performed with analysis of device parameters and review of device function. I have personally reviewed the interrogation findings and agree with findings as stated.     In emergency, the nursing units have been notified to contact the perfusion department either by:  Calling [phone] from 630am to 4pm Mon thru Fri, utilizing the On-Call Finder functionality of Epic and searching for Perfusion, or by contacting the hospital  from 4pm to 630am and on weekends and asking to speak with the perfusionist on call.    1:42 PM

## 2024-06-26 NOTE — PLAN OF CARE
Problem: Diabetes Comorbidity  Goal: Blood Glucose Level Within Targeted Range  Outcome: Progressing  Intervention: Monitor and Manage Glycemia  Flowsheets (Taken 6/26/2024 0128)  Glycemic Management:   blood glucose monitored   supplemental insulin given     Problem: Fall Injury Risk  Goal: Absence of Fall and Fall-Related Injury  Outcome: Progressing  Intervention: Identify and Manage Contributors  Flowsheets (Taken 6/26/2024 0128)  Self-Care Promotion:   independence encouraged   BADL personal objects within reach  Medication Review/Management: medications reviewed  Intervention: Promote Injury-Free Environment  Flowsheets (Taken 6/26/2024 0128)  Safety Promotion/Fall Prevention:   assistive device/personal item within reach   side rails raised x 2   instructed to call staff for mobility   commode/urinal/bedpan at bedside   lighting adjusted   medications reviewed   nonskid shoes/socks when out of bed

## 2024-06-26 NOTE — TREATMENT PLAN
UPDATE: OP iHD referrals for LVAD patient    [facility] referral  6/27--Filled out HASTE form (see note from 6/27 @ 1046am for form) for [facility] and emailed to [email or web address] along with Hep labs     NO ACCEPTING FACILITY AT THIS TIME   This RN Will continue to work with [facility] Liaison     6/26-Received call from Shilpa with intake at [facility]  Requested [facility] referral due to previous LVAD at that clinic (2017)  Referral #[id number], requested MWF shift 3 at [facility]---Additional info needed---facesheet, H&P, CXR faxed --will Ordered Chair labs, will fax once resulted  Shilpa stated if P2P is needed or additional info they will reach out  Also explained jayro will likely be discharged to IPR with MWF HD at rehab and re-admitted to [facility] for OP HD     [facility] referral   6/27--[facility]  for [facility] called for more info--stated Marco Antonio Khan MD DENIED ACCEPTANCE due to facility not being close to hospital system. She will review other facilities and get back with me  This RN will continue working with     6/26--Received call from Camelia [phone], [phone]  Referral routed to 1 of 3 high acuity [facility] centers in  --Marco Antonio Khan with Medical director [facility] # [phone], Clinic Directors Cynthia Shea and Pj Palencia listed    Also explained jayro will likely be discharged to IPR with MWF HD at rehab and re-admitted to [facility] for OP HD placement if one is not assigned prior to Rehab  Will consult PM&R today and send referral to rehab     Faxes Failed from yesterday to [facility] and [facility] intake fax lines---possible too much info tagged for transmission    Re-faxing with DP 3 days and facesheet to both [facility] and [facility]    Reached out to Nephrology NP, Norm Stearns, for assistance in VAD placement in OP setting     This team previously had a patient in 2017 at [facility] on [facility] in   Reached out to the clinic --Medical Director is Dr. Dalton Mohan, Ph# to clinic  [phone]  Will see if our staff Nephrologist would mind reaching out to this medical director--in the meantime, this RN is working on referral info for intake to both companies

## 2024-06-26 NOTE — PLAN OF CARE
Unable to collect resp culture d/t no  sputum thru  the shift.  Pt on dialysis. Unable to get accurate urine output d/t unmeasured occurrence with  one wet pad. VSS.  Spouse at bedside. BG monitored. VSS. LVAD number  and doppler WNL. LVAD dressing CDI. Pt educated on fall risk and remained free from falls/trauma/injury. Denies chest pain, SOB, palpitations, dizziness, pain, or discomfort. Plan of care reviewed with pt, all questions answered. Bed locked in lowest position, call bell within reach, no acute distress noted, will continue to monitor.

## 2024-06-26 NOTE — PROGRESS NOTES
[facility] - Cardiology Stepdown  Heart Transplant  Progress Note    Patient Name: Radha Abbott  MRN: [id number]  Admission Date: 6/11/2024  Hospital Length of Stay: 15 days  Attending Physician: Juventino Bermudez Jr.*  Primary Care Provider: Vasu Kong MD  Principal Problem:Acute renal failure superimposed on stage 4 chronic kidney disease    Subjective:   Interval History:   -underwent HD yesterday    Subjective:  -feeling well, wants to know when he will go home    Continuous Infusions:  Scheduled Meds:   0.9% NaCl   Intravenous Once    amiodarone  400 mg Oral Daily    amoxicillin  500 mg Per NG tube Daily    atorvastatin  40 mg Oral QHS    epoetin zohaib (PROCRIT) injection  10,000 Units Intravenous Every Mon, Wed, Fri    folic acid  1 mg Oral Daily    gabapentin  100 mg Oral BID    LIDOcaine  2 patch Transdermal Q24H    midodrine  10 mg Oral Q8H    pantoprazole  40 mg Oral Daily    QUEtiapine  100 mg Oral QHS    sevelamer carbonate  0.8 g Oral TID WM    sodium bicarbonate  650 mg Oral TID    venlafaxine  37.5 mg Oral Daily    vitamin D  1,000 Units Oral Daily    warfarin  2.5 mg Oral Daily     PRN Meds:  Current Facility-Administered Medications:     0.9%  NaCl infusion (for blood administration), , Intravenous, Q24H PRN    0.9% NaCl, , Intravenous, PRN    acetaminophen, 650 mg, Oral, Q6H PRN    acetaminophen, 650 mg, Per NG tube, Q6H PRN    dextrose 10%, 12.5 g, Intravenous, PRN    dextrose 10%, 12.5 g, Intravenous, PRN    dextrose 10%, 25 g, Intravenous, PRN    dextrose 10%, 25 g, Intravenous, PRN    glucagon (human recombinant), 1 mg, Intramuscular, PRN    glucose, 16 g, Oral, PRN    glucose, 24 g, Oral, PRN    insulin aspart U-100, 0-10 Units, Subcutaneous, QID (AC + HS) PRN    simethicone, 1 tablet, Oral, TID PRN    traZODone, 25 mg, Oral, Nightly PRN    Review of patient's allergies indicates:  No Known Allergies  Objective:     Vital Signs (Most Recent):  Temp: 97.8 °F (36.6 °C) (06/26/24  1500)  Pulse: 84 (06/26/24 1549)  Resp: 18 (06/26/24 1500)  BP: 95/63 (06/26/24 1515)  SpO2: 97 % (06/26/24 1500) Vital Signs (24h Range):  Temp:  [97 °F (36.1 °C)-98.7 °F (37.1 °C)] 97.8 °F (36.6 °C)  Pulse:  [62-87] 84  Resp:  [16-18] 18  SpO2:  [95 %-99 %] 97 %  BP: ()/(0-81) 95/63     Patient Vitals for the past 72 hrs (Last 3 readings):   Weight   06/26/24 0915 72 kg (158 lb 11.7 oz)   06/26/24 0524 77.5 kg (170 lb 13.7 oz)   06/25/24 0430 75.1 kg (165 lb 9.1 oz)     Body mass index is 20.94 kg/m².      Intake/Output Summary (Last 24 hours) at 6/26/2024 1622  Last data filed at 6/26/2024 0529  Gross per 24 hour   Intake 790 ml   Output 1100 ml   Net -310 ml       Hemodynamic Parameters:       Telemetry: sinus rhythm       Physical Exam  Constitutional:       Appearance: Normal appearance.   Cardiovascular:      Comments: JVP at clavicle; VAD hum  Pulmonary:      Effort: Pulmonary effort is normal.      Breath sounds: Normal breath sounds.   Musculoskeletal:      Right lower leg: No edema.      Left lower leg: No edema.   Skin:     General: Skin is warm and dry.   Neurological:      Mental Status: He is alert.            Significant Labs:  CBC:  Recent Labs   Lab 06/24/24  0533 06/25/24  0407 06/26/24  0500   WBC 7.78 6.26 7.52   RBC 2.71* 3.02* 2.72*   HGB 7.7* 8.3* 7.5*   HCT 23.3* 25.9* 24.0*    426 460*   MCV 86 86 88   MCH 28.4 27.5 27.6   MCHC 33.0 32.0 31.3*     BNP:  Recent Labs   Lab 06/21/24  0451 06/24/24  0533 06/26/24  0500   BNP 1,328* 523* 598*     CMP:  Recent Labs   Lab 06/21/24  0451 06/22/24  0232 06/22/24  0808 06/23/24  0456 06/24/24  0533 06/25/24  0407 06/26/24  0500   *   < > 218*   < > 89 134* 93   CALCIUM 8.7   < > 8.7   < > 9.0 9.1 8.4*   ALBUMIN 2.1*  --  2.1*  --  2.0*  --  2.0*   PROT 7.7  --   --   --  7.2  --  7.3   *   < > 134*   < > 136 135* 139   K 4.0   < > 3.7   < > 4.4 4.7 4.1   CO2 16*   < > 24   < > 27 22* 25      < > 95   < > 93* 95 101    BUN 54*   < > 43*   < > 89* 107* 42*   CREATININE 3.7*   < > 3.0*   < > 6.1* 7.1* 4.0*   ALKPHOS 387*  --   --   --  256*  --  245*   *  --   --   --  70*  --  55*   AST 67*  --   --   --  68*  --  64*   BILITOT 0.4  --   --   --  0.5  --  0.5    < > = values in this interval not displayed.      Coagulation:   Recent Labs   Lab 06/24/24  0533 06/25/24  0407 06/26/24  0500   INR 2.1* 2.2* 2.4*   APTT 40.0* 40.1* 43.0*     LDH:  Recent Labs   Lab 06/24/24 0533 06/25/24  0407 06/26/24  0500   * 313* 382*     Microbiology:  Microbiology Results (last 7 days)       ** No results found for the last 168 hours. **            I have reviewed all pertinent labs within the past 24 hours.    Estimated Creatinine Clearance: 19.5 mL/min (A) (based on SCr of 4 mg/dL (H)).    Diagnostic Results:  I have reviewed and interpreted all pertinent imaging results/findings within the past 24 hours.  Assessment and Plan:     Mr. Radha Abbott is a 63 yo male with stage D HFrEF, ICMP who underwent HM3 placement as DT (12/1/23) w/ a hospital stay complicated by vasoplegia(requiring Giaprezza), debility, malnutrition/impaired swallowing, and renal failure requiring HD (since weaned off) w/ recent discharge for ADHF presented to the ED with wife due to c/o worsening generalized weakness since 6/10. Per wife patient was mostly sleeping during the day despite sleeping 10 hrs at night. Patient also admits decreased appetite and decreased urine output despite taking his medications as prescribed. He has not urinated since 6/11 at 2pm. He has chronic shortness of breath on exertion. Denies confusion, syncope, diarrhea, constipation, N/V, dysuria, or other complaints. Patient had his prior tunnel cath removed used for HD about 2 weeks ago. He completed a 6 week therapy for Enterococcus bacteremia with ampicillin and rocephin ending 5/30/24a and was switched to PO antibiotics.     On prior hospital stay nephrology consulted during  hospital stay due to elevated renal function. They feel he is close to being a dialysis patient. He diuresed on IV Bumex with prn Metolazone. He is net negative 1.2L throughout his hospital stay and weight on day of discharge was 179lbs down from 186lbs on admit. We continued his home dose of Bumex 4mg BID but increased his prn Metolazone to 10mg. He received a dose of Epo while inpatient and he has plans to follow up with Heme/Onc to get outpatient therapy arranged.      2D Echo with CFD done on 3/26/2024  LVAD: There is a Heartmate III LVAD running at 5000 RPM. The aortic valve does not open. The ventricular septum is at midline. Inflow cannula well seated at the apex. Outflow graft not visualized.   Left Ventricle: The left ventricle is moderately dilated. Normal wall thickness. Global hypokinesis present. Septal motion is abnormal. There is severely reduced systolic function with a visually estimated ejection fraction of 5 - 10%. There is diastolic dysfunction but grade cannot be determined.   Right Ventricle: Mild right ventricular enlargement. Wall thickness is normal. Right ventricle wall motion is normal. Systolic function is normal. Pacemaker lead present in the ventricle.   Left Atrium: Left atrium is severely dilated.   Right Atrium: Right atrium is moderately dilated.   Mitral Valve: The mitral valve is repaired with an Noble stitch. There is mild regurgitation.   IVC/SVC: Normal venous pressure at 3 mmHg.              * Acute renal failure superimposed on stage 4 chronic kidney disease  Mr. 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 Admitted for worsening renal function with a BUN>100/Cr. 9.9.     - CT Ab/pel: Kidneys/ Ureters: Normal in size and location.  Mild bilateral perinephric stranding, a nonspecific finding and similar to prior.  No  hydronephrosis or nephrolithiasis. No ureteral dilatation.  - tunneled line placed 6/24 for HD   - HD per nephro   - Will need OP HD upon discharge, recommend CM/SW involvement to start workup.  - Renally dosing medications      Bacteremia due to Enterococcus  He completed a 6 week therapy for Enterococcus bacteremia with ampicillin and rocephin ending 5/30/24a and was switched to Amoxicillin for suppression    Anticoagulant long-term use  Cont warfarin    Anemia  Chronic with no acute bleeding, multifactorial 2/2 CKD4 and chronic anticoagulation  -Iron panel done. Iron deficient but Ferritin is elevated at 1186  -Homocysteine normal and methylmalonic pending   -s/p Epo   -Replaced IV iron   -Continue to trend CBC's    Adjustment disorder with depressed mood  Cont SSRI    LVAD (left ventricular assist device) present  -HeartMate 3 Implanted  12/1/2023  as DT  -Cont Coumadin, dosing by PharmD.  Goal INR 2.0-3.0. therapeutic today.   -Antiplatelets Not on ASA  -LDH is stable overall today. Will continue to monitor daily.  -Speed set at  5000     -Interrogation notable for no events  -Not listed for OHTx, declined for OHTX due to comorbidities     Procedure: Device Interrogation Including analysis of device parameters  Current Settings: Ventricular Assist Device  Review of device function is stable        6/26/2024     3:46 PM 6/26/2024    12:00 PM 6/26/2024     8:00 AM 6/26/2024     5:25 AM 6/25/2024    11:32 PM 6/25/2024     8:18 PM 6/25/2024     4:00 PM   TXP LVAD INTERROGATIONS   Type HeartMate3 HeartMate3 HeartMate3 HeartMate3 HeartMate3 HeartMate3 HeartMate3   Flow 4.2 3.9 4.1 4.2 3.9 4.1 4   Speed 5000 5000 5000 5050 5000 5000 5000   PI 4.2 4.7 4.1 3.7 4.8 4.1 4.5   Power (Carrington) 3.4 3.5 3.5 3.4 3.4 3.3 3.5   LSL 4600 4600 4600 4600 4600 4600 4600   Low Flow Alarm none none none no      High Power Alarm none none none no      Pulsatility Pulse Pulse Pulse Intermittent pulse Intermittent pulse Intermittent pulse  Intermittent pulse         PAF (paroxysmal atrial fibrillation)  -Continue amiodarone and Coumadin      Type 2 diabetes mellitus without complication, without long-term current use of insulin  -Endocrine    Acute on chronic combined systolic and diastolic heart failure  Owing to his advanced kidney disease and significantly elevated creat, he is not on a  ACEI/ARB/ARNI or MRA. He is also not on a beta blocker due to RV dysfunction.     Plan:   -Volume removal with HD   - See LVAD  - See IVÁN    CAD (coronary artery disease)  -Continue statin        Mariann Bee MD  Heart Transplant  [facility] - Cardiology Stepdown

## 2024-06-26 NOTE — PROGRESS NOTES
06/25/24 2345   Vital Signs   Temp 98.7 °F (37.1 °C)   Temp Source Oral   Pulse 82   Heart Rate Source Monitor   Resp 16   Device (Oxygen Therapy) room air   BP 95/68   MAP (mmHg) 77   BP Location Right arm   BP Method Automatic   Patient Position Lying     Pt is alert and stable for tx.  Bedside HD 1:1 tx initiated aseptically via RIJ TDC with good flow.  Net UF of 500ml as tolerated.

## 2024-06-26 NOTE — SUBJECTIVE & OBJECTIVE
Interval History:   -underwent HD yesterday    Subjective:  -feeling well, wants to know when he will go home    Continuous Infusions:  Scheduled Meds:   0.9% NaCl   Intravenous Once    amiodarone  400 mg Oral Daily    amoxicillin  500 mg Per NG tube Daily    atorvastatin  40 mg Oral QHS    epoetin zohaib (PROCRIT) injection  10,000 Units Intravenous Every Mon, Wed, Fri    folic acid  1 mg Oral Daily    gabapentin  100 mg Oral BID    LIDOcaine  2 patch Transdermal Q24H    midodrine  10 mg Oral Q8H    pantoprazole  40 mg Oral Daily    QUEtiapine  100 mg Oral QHS    sevelamer carbonate  0.8 g Oral TID WM    sodium bicarbonate  650 mg Oral TID    venlafaxine  37.5 mg Oral Daily    vitamin D  1,000 Units Oral Daily    warfarin  2.5 mg Oral Daily     PRN Meds:  Current Facility-Administered Medications:     0.9%  NaCl infusion (for blood administration), , Intravenous, Q24H PRN    0.9% NaCl, , Intravenous, PRN    acetaminophen, 650 mg, Oral, Q6H PRN    acetaminophen, 650 mg, Per NG tube, Q6H PRN    dextrose 10%, 12.5 g, Intravenous, PRN    dextrose 10%, 12.5 g, Intravenous, PRN    dextrose 10%, 25 g, Intravenous, PRN    dextrose 10%, 25 g, Intravenous, PRN    glucagon (human recombinant), 1 mg, Intramuscular, PRN    glucose, 16 g, Oral, PRN    glucose, 24 g, Oral, PRN    insulin aspart U-100, 0-10 Units, Subcutaneous, QID (AC + HS) PRN    simethicone, 1 tablet, Oral, TID PRN    traZODone, 25 mg, Oral, Nightly PRN    Review of patient's allergies indicates:  No Known Allergies  Objective:     Vital Signs (Most Recent):  Temp: 97.8 °F (36.6 °C) (06/26/24 1500)  Pulse: 84 (06/26/24 1549)  Resp: 18 (06/26/24 1500)  BP: 95/63 (06/26/24 1515)  SpO2: 97 % (06/26/24 1500) Vital Signs (24h Range):  Temp:  [97 °F (36.1 °C)-98.7 °F (37.1 °C)] 97.8 °F (36.6 °C)  Pulse:  [62-87] 84  Resp:  [16-18] 18  SpO2:  [95 %-99 %] 97 %  BP: ()/(0-81) 95/63     Patient Vitals for the past 72 hrs (Last 3 readings):   Weight   06/26/24 0915 72  kg (158 lb 11.7 oz)   06/26/24 0524 77.5 kg (170 lb 13.7 oz)   06/25/24 0430 75.1 kg (165 lb 9.1 oz)     Body mass index is 20.94 kg/m².      Intake/Output Summary (Last 24 hours) at 6/26/2024 1622  Last data filed at 6/26/2024 0529  Gross per 24 hour   Intake 790 ml   Output 1100 ml   Net -310 ml       Hemodynamic Parameters:       Telemetry: sinus rhythm       Physical Exam  Constitutional:       Appearance: Normal appearance.   Cardiovascular:      Comments: JVP at clavicle; VAD hum  Pulmonary:      Effort: Pulmonary effort is normal.      Breath sounds: Normal breath sounds.   Musculoskeletal:      Right lower leg: No edema.      Left lower leg: No edema.   Skin:     General: Skin is warm and dry.   Neurological:      Mental Status: He is alert.            Significant Labs:  CBC:  Recent Labs   Lab 06/24/24  0533 06/25/24  0407 06/26/24  0500   WBC 7.78 6.26 7.52   RBC 2.71* 3.02* 2.72*   HGB 7.7* 8.3* 7.5*   HCT 23.3* 25.9* 24.0*    426 460*   MCV 86 86 88   MCH 28.4 27.5 27.6   MCHC 33.0 32.0 31.3*     BNP:  Recent Labs   Lab 06/21/24  0451 06/24/24  0533 06/26/24  0500   BNP 1,328* 523* 598*     CMP:  Recent Labs   Lab 06/21/24  0451 06/22/24  0232 06/22/24  0808 06/23/24  0456 06/24/24  0533 06/25/24  0407 06/26/24  0500   *   < > 218*   < > 89 134* 93   CALCIUM 8.7   < > 8.7   < > 9.0 9.1 8.4*   ALBUMIN 2.1*  --  2.1*  --  2.0*  --  2.0*   PROT 7.7  --   --   --  7.2  --  7.3   *   < > 134*   < > 136 135* 139   K 4.0   < > 3.7   < > 4.4 4.7 4.1   CO2 16*   < > 24   < > 27 22* 25      < > 95   < > 93* 95 101   BUN 54*   < > 43*   < > 89* 107* 42*   CREATININE 3.7*   < > 3.0*   < > 6.1* 7.1* 4.0*   ALKPHOS 387*  --   --   --  256*  --  245*   *  --   --   --  70*  --  55*   AST 67*  --   --   --  68*  --  64*   BILITOT 0.4  --   --   --  0.5  --  0.5    < > = values in this interval not displayed.      Coagulation:   Recent Labs   Lab 06/24/24  0533 06/25/24  0403  06/26/24  0500   INR 2.1* 2.2* 2.4*   APTT 40.0* 40.1* 43.0*     LDH:  Recent Labs   Lab 06/24/24  0533 06/25/24  0407 06/26/24  0500   * 313* 382*     Microbiology:  Microbiology Results (last 7 days)       ** No results found for the last 168 hours. **            I have reviewed all pertinent labs within the past 24 hours.    Estimated Creatinine Clearance: 19.5 mL/min (A) (based on SCr of 4 mg/dL (H)).    Diagnostic Results:  I have reviewed and interpreted all pertinent imaging results/findings within the past 24 hours.

## 2024-06-26 NOTE — NURSING
Received report from  KYAW Dias earlier. Patient alert and oriented. No  pain. No sign of distress. Iv line in place- saline locked. Dialysis cath inplace. Tele monitor in place.On room air. LVAD in place.  Call bell given on his reach. Instructed to call for any concerns or assistance. Bed on lowest position. Non skid socks on. Plan of care discussed with patient, question answered.    23:30 Dialysis started at bedside.

## 2024-06-26 NOTE — PROGRESS NOTES
06/26/24 0245   Vital Signs   Temp 98.5 °F (36.9 °C)   Temp Source Oral   Pulse 85   Heart Rate Source Monitor   Resp 16   Device (Oxygen Therapy) room air   BP (!) 83/45   MAP (mmHg) 61   BP Location Right arm   BP Method Automatic   Patient Position Lying     HD tx completed and pt tolerated well.  Tx time: 3hrs.  Net UF: 500ml as ordered.  Pt is alert and stable.

## 2024-06-26 NOTE — CARE UPDATE
BG goal 140-180    -A1C   Lab Results   Component Value Date    HGBA1C 7.4 (H) 04/17/2024       -HOME REGIMEN: Amaryl 1 mg (recently prescribed by PCP about a week ago)        -INPATIENT REGIMEN: Novolog Correction Scale     -GLUCOSE TREND FOR THE PAST 24HRS: 155-229    -NO HYPOGYCEMIAS NOTED       -Diet: Diet Soft & Bite Sized (IDDSI Level 6) Fluid - 1500mL; Thin      -Steroids -  n/a     -Tube Feeds -  off and NG tube pulled on 6/22      Remains in CSU. BG stable with prn SQ insulin correction scale. Creatinine 4.0; HD. Will consider low dose scheduled Novolog if continues to require prn sliding scale.       Plan:  -Continue Moderate Dose Correction Scale   -BG monitoring ac/hs  -Hypoglycemia protocol in place    ** Please call Endocrine for any BG related issues **     Discharge planning:tbd    Endocrine to continue to follow    ** Please call Endocrine for any BG related issues **

## 2024-06-26 NOTE — PROCEDURES
"Radha Abbott is a 64 y.o. male patient.    Temp: 98 °F (36.7 °C) (06/26/24 0750)  Pulse: 82 (06/26/24 0750)  Resp: 18 (06/26/24 0750)  BP: (!) 87/60 (06/26/24 0752)  SpO2: 97 % (06/26/24 0750)  Weight: 77.5 kg (170 lb 13.7 oz) (06/26/24 0524)  Height: 6' 1" (185.4 cm) (06/18/24 1357)    Procedures        6/25/2024    11:59 AM 6/25/2024     8:00 AM   TXP LVAD INTERROGATIONS   Type HeartMate3 HeartMate3   Flow 4.3 4.3   Speed 5000 5000   PI 3.9 3.6   Power ([place]rington) 3.3 3.5   LSL 4600 4600   Low Flow Alarm     High Power Alarm     Pulsatility Pulse Pulse     Interrogation of Ventricular assist device was performed with physician analysis of device parameters and review of device function. I have personally reviewed the interrogation findings and agree with findings as stated.     6/25/2024    "

## 2024-06-26 NOTE — PT/OT/SLP PROGRESS
Physical Therapy   Co-Treatment with OT    Patient Name:  Radha Abbott   MRN:  [id number]    Recommendations:     Discharge Recommendations: High Intensity Therapy  Discharge Equipment Recommendations: walker, rolling  Barriers to discharge: Decreased caregiver support and increase level of assistance    Assessment:     Radha Abbott is a 64 y.o. male admitted with a medical diagnosis of Acute renal failure superimposed on stage 4 chronic kidney disease.  He presents with the following impairments/functional limitations: weakness, impaired endurance, impaired functional mobility, gait instability, decreased coordination, pain, decreased safety awareness.  Pt was agreeable to therapy with max encouragement. Pt demonstrated slightly better bed mobility, but required more assistance with gait today. Pt demonstrated increasing B knee and B elbow flexion when attempting to ambulate. Max cues for standing upright and straight, but ultimately returned to sitting EOB d/t safety. Patient has demonstrated sufficient progression to warrant high intensity therapy evidenced by objectives noted below. Pt is s/p LVAD placement 12/2023.     Rehab Prognosis: Fair; patient would benefit from acute skilled PT services to address these deficits and reach maximum level of function.    Recent Surgery: * No surgery found *      Plan:     During this hospitalization, patient to be seen 4 x/week to address the identified rehab impairments via gait training, therapeutic activities, therapeutic exercises, neuromuscular re-education and progress toward the following goals:    Plan of Care Expires:  07/13/24    Subjective     Chief Complaint: tired   Patient/Family Comments/goals: pt reported sitting up in the chair for ~2 hours this morning   Pain/Comfort:  Pain Rating 1: 0/10  Pain Rating Post-Intervention 1: 0/10      Objective:     Communicated with nurse prior to session.  Patient found HOB elevated with telemetry, LVAD upon PT entry to room.      General Precautions: Standard, LVAD, fall  Orthopedic Precautions: N/A  Braces: N/A  Respiratory Status: Room air     Functional Mobility:  Additional staff present: OT  Co-treatment performed due to patient's multiple deficits requiring two skilled therapists to appropriately and safely assess patient's strength and endurance while facilitating functional tasks in addition to accommodating for patient's activity tolerance  Bed Mobility:   Supine to Sit: minimum assistance; HOB elevated  Sit to Supine: contact guard assistance  Transfers:   Sit <> Stand Transfer: moderate assistance with rolling walker   Gait:  Pt ambulated 2 steps forward + 2 steps laterally with maximal assistance and of 2 persons and rolling walker.  All lines remained intact throughout ambulation trial, gait belt utilized.  For safety due to increase flexed posture, pt returned to sitting EOB after 2 step forward with totalA.  Gait Deviation(s): noted increasing flexing posture, decrease gian, poor RW management/sequencing  Verbal/tactile cues for sequencing, straightening UE and LE for more upright posture    AM-PAC 6 CLICK MOBILITY  Turning over in bed (including adjusting bedclothes, sheets and blankets)?: 3  Sitting down on and standing up from a chair with arms (e.g., wheelchair, bedside commode, etc.): 3  Moving from lying on back to sitting on the side of the bed?: 3  Moving to and from a bed to a chair (including a wheelchair)?: 2  Need to walk in hospital room?: 2  Climbing 3-5 steps with a railing?: 1  Basic Mobility Total Score: 14       Treatment & Education:  Pt found on wall power for LVAD and maintained on wall power.  Patient educated on role of therapy, goals of session, and benefits of out of bed mobility.   Instructed on use of call button and importance of calling nursing staff for assistance with mobility   Questions/concerns addressed within PTA scope of practice  Pt verbalized understanding.  Whiteboard  Updated    Patient left HOB elevated with all lines intact, call button in reach, and nurse notified..    GOALS:   Multidisciplinary Problems       Physical Therapy Goals          Problem: Physical Therapy    Goal Priority Disciplines Outcome Goal Variances Interventions   Physical Therapy Goal     PT, PT/OT Progressing     Description: Goals to be completed by: 7/14/24    1.Pt will perform sup<>sit transfers w/ contact guard assistance  2.Pt will have sufficient dynamic balance to sit EOB while performing ADLs/therex w/ supervision  3.Pt will be able to stand up from EOB w/ contact guard assistance using LRAD  4.Pt will ambulate 100 feet w/ minimum assistance using LRAD  5.Pt will be independent w/ HEP therex on BLE w/ good form and ROM                        Time Tracking:     PT Received On: 06/26/24  PT Start Time: 1400     PT Stop Time: 1419  PT Total Time (min): 19 min     Billable Minutes: Therapeutic Activity 19    Treatment Type: Treatment  PT/PTA: PTA     Number of PTA visits since last PT visit: 1 06/26/2024

## 2024-06-26 NOTE — HPI
Radha Abbott is a 63-year-old male with PMHx of CAD s/p CABG, DM2, HTN, HLD, Vfib on amio, stage D HFrEF, S/p LVAD 12/1/23 course complicated by acute renal failure requiring HD (tunneled HD catheter removed in early March of this year), and chronic AC with Coumadin, and recently completed a 6 week therapy for Enterococcus bacteremia with ampicillin and rocephin ending 5/30/24 and was switched to PO antibiotics. Patient presenting to C on 6/10/24 for weakness and decreased appetite. CTH revealed no acute intracranial process and no significant change. Heart transplant following and LDH is stable overall. Continues on Coumadin. Not listed for OHTx, declined for OHTX due to comorbidities. Nephrology following for CKD and now plan will be to discharge on MWF HD. TDC placed by IR 6/24     Functional History: Patient lives in [place] with wife in a single story home with 1 step to enter.  Prior to admission, I. DME: none.

## 2024-06-26 NOTE — PLAN OF CARE
Pt on dialysis. VSS.  Spouse at bedside. BG monitored. VSS. LVAD number and doppler WNL. LVAD dressing CDI. Pt educated on fall risk and remained free from falls/trauma/injury. Denies chest pain, SOB, palpitations, dizziness, pain, or discomfort. Plan of care reviewed with pt, all questions answered. Bed locked in lowest position, call bell within reach, no acute distress noted, will continue to monitor.

## 2024-06-26 NOTE — PT/OT/SLP PROGRESS
Occupational Therapy   Treatment  Co-treatment performed due to patient's multiple deficits requiring two skilled therapists to appropriately and safely assess patient's strength and endurance while facilitating functional tasks in addition to accommodating for patient's activity tolerance.     Name: aRdha Abbott  MRN: [id number]  Admitting Diagnosis:  Acute renal failure superimposed on stage 4 chronic kidney disease       Recommendations:     Discharge Recommendations: High Intensity Therapy  Discharge Equipment Recommendations:  walker, rolling  Barriers to discharge:  None    Assessment:     Radha Abbott is a 64 y.o. male with a medical diagnosis of Acute renal failure superimposed on stage 4 chronic kidney disease.  He presents with decreased command following this date. Pt participated in bed mobility, sts, LBD, and functional mobility. He demo'd decreased standing tolerance displayed by slowly lowering his chest and buttock towards to ground. Pt did not demo bilateral knee buckle. Performance deficits affecting function: weakness, gait instability, impaired endurance, impaired self care skills, impaired functional mobility, impaired balance, decreased upper extremity function, decreased lower extremity function.      Rehab Prognosis:  Good; patient would benefit from acute skilled OT services to address these deficits and reach maximum level of function.       Plan:     Patient to be seen 4 x/week to address the above listed problems via self-care/home management, therapeutic activities, therapeutic exercises, neuromuscular re-education  Plan of Care Expires: 07/14/24  Plan of Care Reviewed with: patient    Subjective     Chief Complaint: none stated  Patient/Family Comments/goals: I'm about to sit down  Pain/Comfort:  Pain Rating 1: 0/10  Pain Rating Post-Intervention 1: 0/10    Objective:     Communicated with: RN prior to session.  Patient found HOB elevated with LVAD, telemetry upon OT entry to  room.    General Precautions: Standard, LVAD, fall    Orthopedic Precautions:N/A  Braces: N/A  Respiratory Status: Room air     Occupational Performance:     Bed Mobility:    Patient completed Supine to Sit with minimum assistance  TRUNK MGMT  Patient completed Sit to Supine with contact guard assistance     Functional Mobility/Transfers:  Patient completed Sit <> Stand Transfer with moderate assistance  with  rolling walker   2 trials with increased pt participation occurring during the second trial.  Functional Mobility: Pt completed 2 left lateral steps with MAX A of TWO, RW. He req MAX VC for sequencing and RW mgmt    Activities of Daily Living:  Lower Body Dressing: modified independence - don BLE sock laying down in bed with HOB elevated      [facility] 6 Click ADL: 12    Treatment & Education:  -Education on energy conservation and task modification to maximize safety and (I) during ADLs and mobility  -Education on importance of OOB activity to improve overall activity tolerance and promote recovery  -Pt educated to call for assistance and to transfer with hospital staff only  -Provided education regarding role of OT with pt verbalizing understanding.  Pt had no further questions & when asked whether there were any concerns pt reported none.      Patient left HOB elevated with all lines intact and call button in reach    GOALS:   Multidisciplinary Problems       Occupational Therapy Goals          Problem: Occupational Therapy    Goal Priority Disciplines Outcome Interventions   Occupational Therapy Goal     OT, PT/OT Progressing    Description: Goals to be met by: 7/14/24     Patient will increase functional independence with ADLs by performing:    UE Dressing with Set-up Assistance.  LE Dressing with Minimal Assistance.  Grooming while standing at sink with Stand-by Assistance.  Toileting from toilet/bedside commode with Minimal Assistance for hygiene and clothing management.   Supine to sit with Stand-by  Assistance.  Step transfer with Contact Guard Assistance  Toilet transfer to toilet/bedside commode with Contact Guard Assistance.                         Time Tracking:     OT Date of Treatment: 06/26/24  OT Start Time: 1407  OT Stop Time: 1420  OT Total Time (min): 13 min    Billable Minutes:Self Care/Home Management 13    OT/PRIETO: OT          6/26/2024

## 2024-06-27 ENCOUNTER — PATIENT MESSAGE (OUTPATIENT)
Dept: TRANSPLANT | Facility: CLINIC | Age: 64
End: 2024-06-27
Payer: MEDICAID

## 2024-06-27 PROBLEM — Z99.2 DIALYSIS PATIENT: Status: ACTIVE | Noted: 2024-06-27

## 2024-06-27 LAB
ANION GAP SERPL CALC-SCNC: 15 MMOL/L (ref 8–16)
APTT PPP: 44 SEC (ref 21–32)
BASOPHILS # BLD AUTO: 0.05 K/UL (ref 0–0.2)
BASOPHILS NFR BLD: 0.7 % (ref 0–1.9)
BUN SERPL-MCNC: 57 MG/DL (ref 8–23)
CALCIUM SERPL-MCNC: 8.7 MG/DL (ref 8.7–10.5)
CHLORIDE SERPL-SCNC: 102 MMOL/L (ref 95–110)
CO2 SERPL-SCNC: 21 MMOL/L (ref 23–29)
CREAT SERPL-MCNC: 5.8 MG/DL (ref 0.5–1.4)
DIFFERENTIAL METHOD BLD: ABNORMAL
EOSINOPHIL # BLD AUTO: 0.2 K/UL (ref 0–0.5)
EOSINOPHIL NFR BLD: 3.2 % (ref 0–8)
ERYTHROCYTE [DISTWIDTH] IN BLOOD BY AUTOMATED COUNT: 18 % (ref 11.5–14.5)
EST. GFR  (NO RACE VARIABLE): 10.3 ML/MIN/1.73 M^2
GLUCOSE SERPL-MCNC: 133 MG/DL (ref 70–110)
HCT VFR BLD AUTO: 26.3 % (ref 40–54)
HGB BLD-MCNC: 8.3 G/DL (ref 14–18)
IMM GRANULOCYTES # BLD AUTO: 0.08 K/UL (ref 0–0.04)
IMM GRANULOCYTES NFR BLD AUTO: 1.2 % (ref 0–0.5)
INR PPP: 2.4 (ref 0.8–1.2)
LDH SERPL L TO P-CCNC: 274 U/L (ref 110–260)
LYMPHOCYTES # BLD AUTO: 0.9 K/UL (ref 1–4.8)
LYMPHOCYTES NFR BLD: 13.2 % (ref 18–48)
MAGNESIUM SERPL-MCNC: 2.2 MG/DL (ref 1.6–2.6)
MCH RBC QN AUTO: 28.3 PG (ref 27–31)
MCHC RBC AUTO-ENTMCNC: 31.6 G/DL (ref 32–36)
MCV RBC AUTO: 90 FL (ref 82–98)
MONOCYTES # BLD AUTO: 0.6 K/UL (ref 0.3–1)
MONOCYTES NFR BLD: 8.6 % (ref 4–15)
NEUTROPHILS # BLD AUTO: 5 K/UL (ref 1.8–7.7)
NEUTROPHILS NFR BLD: 73.1 % (ref 38–73)
NRBC BLD-RTO: 0 /100 WBC
PHOSPHATE SERPL-MCNC: 5.4 MG/DL (ref 2.7–4.5)
PLATELET # BLD AUTO: 475 K/UL (ref 150–450)
PMV BLD AUTO: 10.3 FL (ref 9.2–12.9)
POCT GLUCOSE: 127 MG/DL (ref 70–110)
POCT GLUCOSE: 170 MG/DL (ref 70–110)
POCT GLUCOSE: 239 MG/DL (ref 70–110)
POTASSIUM SERPL-SCNC: 4.2 MMOL/L (ref 3.5–5.1)
PROTHROMBIN TIME: 24.5 SEC (ref 9–12.5)
RBC # BLD AUTO: 2.93 M/UL (ref 4.6–6.2)
SODIUM SERPL-SCNC: 138 MMOL/L (ref 136–145)
WBC # BLD AUTO: 6.88 K/UL (ref 3.9–12.7)

## 2024-06-27 PROCEDURE — 85610 PROTHROMBIN TIME: CPT | Performed by: STUDENT IN AN ORGANIZED HEALTH CARE EDUCATION/TRAINING PROGRAM

## 2024-06-27 PROCEDURE — 25000003 PHARM REV CODE 250: Performed by: STUDENT IN AN ORGANIZED HEALTH CARE EDUCATION/TRAINING PROGRAM

## 2024-06-27 PROCEDURE — 93750 INTERROGATION VAD IN PERSON: CPT | Mod: ,,, | Performed by: INTERNAL MEDICINE

## 2024-06-27 PROCEDURE — 99233 SBSQ HOSP IP/OBS HIGH 50: CPT | Mod: FS,,, | Performed by: INTERNAL MEDICINE

## 2024-06-27 PROCEDURE — 25000003 PHARM REV CODE 250: Performed by: INTERNAL MEDICINE

## 2024-06-27 PROCEDURE — 83735 ASSAY OF MAGNESIUM: CPT | Performed by: STUDENT IN AN ORGANIZED HEALTH CARE EDUCATION/TRAINING PROGRAM

## 2024-06-27 PROCEDURE — 25000003 PHARM REV CODE 250: Performed by: NURSE PRACTITIONER

## 2024-06-27 PROCEDURE — 25000003 PHARM REV CODE 250: Performed by: PHYSICIAN ASSISTANT

## 2024-06-27 PROCEDURE — 97530 THERAPEUTIC ACTIVITIES: CPT

## 2024-06-27 PROCEDURE — 83615 LACTATE (LD) (LDH) ENZYME: CPT | Performed by: STUDENT IN AN ORGANIZED HEALTH CARE EDUCATION/TRAINING PROGRAM

## 2024-06-27 PROCEDURE — 97116 GAIT TRAINING THERAPY: CPT

## 2024-06-27 PROCEDURE — 36415 COLL VENOUS BLD VENIPUNCTURE: CPT | Performed by: STUDENT IN AN ORGANIZED HEALTH CARE EDUCATION/TRAINING PROGRAM

## 2024-06-27 PROCEDURE — 25000003 PHARM REV CODE 250: Performed by: REGISTERED NURSE

## 2024-06-27 PROCEDURE — 85730 THROMBOPLASTIN TIME PARTIAL: CPT | Performed by: STUDENT IN AN ORGANIZED HEALTH CARE EDUCATION/TRAINING PROGRAM

## 2024-06-27 PROCEDURE — 85025 COMPLETE CBC W/AUTO DIFF WBC: CPT | Performed by: STUDENT IN AN ORGANIZED HEALTH CARE EDUCATION/TRAINING PROGRAM

## 2024-06-27 PROCEDURE — 99232 SBSQ HOSP IP/OBS MODERATE 35: CPT | Mod: ,,, | Performed by: PHYSICIAN ASSISTANT

## 2024-06-27 PROCEDURE — 84100 ASSAY OF PHOSPHORUS: CPT | Performed by: STUDENT IN AN ORGANIZED HEALTH CARE EDUCATION/TRAINING PROGRAM

## 2024-06-27 PROCEDURE — 99233 SBSQ HOSP IP/OBS HIGH 50: CPT | Mod: FS,,, | Performed by: HOSPITALIST

## 2024-06-27 PROCEDURE — 63600175 PHARM REV CODE 636 W HCPCS: Performed by: NURSE PRACTITIONER

## 2024-06-27 PROCEDURE — 27000248 HC VAD-ADDITIONAL DAY

## 2024-06-27 PROCEDURE — 20600001 HC STEP DOWN PRIVATE ROOM

## 2024-06-27 PROCEDURE — 80048 BASIC METABOLIC PNL TOTAL CA: CPT | Performed by: STUDENT IN AN ORGANIZED HEALTH CARE EDUCATION/TRAINING PROGRAM

## 2024-06-27 RX ORDER — CYANOCOBALAMIN (VITAMIN B-12) 250 MCG
250 TABLET ORAL DAILY
Status: DISCONTINUED | OUTPATIENT
Start: 2024-06-28 | End: 2024-07-01 | Stop reason: HOSPADM

## 2024-06-27 RX ORDER — SODIUM CHLORIDE 9 MG/ML
INJECTION, SOLUTION INTRAVENOUS ONCE
Status: CANCELLED | OUTPATIENT
Start: 2024-06-28

## 2024-06-27 RX ADMIN — AMIODARONE HYDROCHLORIDE 400 MG: 200 TABLET ORAL at 09:06

## 2024-06-27 RX ADMIN — WARFARIN SODIUM 2.5 MG: 2.5 TABLET ORAL at 04:06

## 2024-06-27 RX ADMIN — SEVELAMER CARBONATE 0.8 G: 800 POWDER, FOR SUSPENSION ORAL at 09:06

## 2024-06-27 RX ADMIN — VENLAFAXINE 37.5 MG: 37.5 TABLET ORAL at 09:06

## 2024-06-27 RX ADMIN — AMOXICILLIN 500 MG: 400 POWDER, FOR SUSPENSION ORAL at 08:06

## 2024-06-27 RX ADMIN — GABAPENTIN 100 MG: 100 CAPSULE ORAL at 09:06

## 2024-06-27 RX ADMIN — GABAPENTIN 100 MG: 100 CAPSULE ORAL at 08:06

## 2024-06-27 RX ADMIN — MIDODRINE HYDROCHLORIDE 10 MG: 5 TABLET ORAL at 12:06

## 2024-06-27 RX ADMIN — MIDODRINE HYDROCHLORIDE 10 MG: 5 TABLET ORAL at 05:06

## 2024-06-27 RX ADMIN — MIDODRINE HYDROCHLORIDE 10 MG: 5 TABLET ORAL at 10:06

## 2024-06-27 RX ADMIN — SEVELAMER CARBONATE 0.8 G: 800 POWDER, FOR SUSPENSION ORAL at 12:06

## 2024-06-27 RX ADMIN — QUETIAPINE FUMARATE 100 MG: 25 TABLET ORAL at 08:06

## 2024-06-27 RX ADMIN — ATORVASTATIN CALCIUM 40 MG: 40 TABLET, FILM COATED ORAL at 08:06

## 2024-06-27 RX ADMIN — SEVELAMER CARBONATE 0.8 G: 800 POWDER, FOR SUSPENSION ORAL at 04:06

## 2024-06-27 RX ADMIN — SODIUM BICARBONATE 650 MG TABLET 650 MG: at 04:06

## 2024-06-27 RX ADMIN — SODIUM BICARBONATE 650 MG TABLET 650 MG: at 09:06

## 2024-06-27 RX ADMIN — PANTOPRAZOLE SODIUM 40 MG: 40 TABLET, DELAYED RELEASE ORAL at 09:06

## 2024-06-27 RX ADMIN — FOLIC ACID 1 MG: 1 TABLET ORAL at 09:06

## 2024-06-27 RX ADMIN — INSULIN ASPART 1 UNITS: 100 INJECTION, SOLUTION INTRAVENOUS; SUBCUTANEOUS at 08:06

## 2024-06-27 RX ADMIN — CHOLECALCIFEROL TAB 25 MCG (1000 UNIT) 1000 UNITS: 25 TAB at 09:06

## 2024-06-27 RX ADMIN — SODIUM BICARBONATE 650 MG TABLET 650 MG: at 08:06

## 2024-06-27 RX ADMIN — INSULIN ASPART 4 UNITS: 100 INJECTION, SOLUTION INTRAVENOUS; SUBCUTANEOUS at 12:06

## 2024-06-27 NOTE — PROGRESS NOTES
06/27/2024  Monet Aguiar    Current provider:  Juventino Bermudez Jr.*    Device interrogation:      6/27/2024     9:34 AM 6/27/2024     4:00 AM 6/27/2024    12:10 AM 6/26/2024     7:51 PM 6/26/2024     3:46 PM 6/26/2024    12:00 PM 6/26/2024     8:00 AM   TXP LVAD INTERROGATIONS   Type HeartMate3 HeartMate3 HeartMate3 HeartMate3 HeartMate3 HeartMate3 HeartMate3   Flow 4.3 4.2 3.9 3.9 4.2 3.9 4.1   Speed 5000 5000 5050 5000 5000 5000 5000   PI 4 3.7 4.4 5.2 4.2 4.7 4.1   Power ([place]) 3.4 3.5 3.4 3.4 3.4 3.5 3.5   LSL  4600 4600 4600 4600 4600 4600   Low Flow Alarm     none none none   High Power Alarm     none none none   Pulsatility  Intermittent pulse Intermittent pulse Intermittent pulse Pulse Pulse Pulse          Rounded on [facility] to ensure all mechanical assist device settings (IABP or VAD) were appropriate and all parameters were within limits.  I was able to ensure all back up equipment was present, the staff had no issues, and the Perfusion Department daily rounding was complete.      For implantable VADs: Interrogation of Ventricular assist device was performed with analysis of device parameters and review of device function. I have personally reviewed the interrogation findings and agree with findings as stated.     In emergency, the nursing units have been notified to contact the perfusion department either by:  Calling [phone] from 630am to 4pm Mon thru Fri, utilizing the On-Call Finder functionality of Epic and searching for Perfusion, or by contacting the hospital  from 4pm to 630am and on weekends and asking to speak with the perfusionist on call.    1:32 PM

## 2024-06-27 NOTE — ASSESSMENT & PLAN NOTE
Chronic with no acute bleeding, multifactorial 2/2 CKD4 and chronic anticoagulation.  Low-normal Vitamin B12 and elevated MMA c/w Vitamin B12 deficiency.  -s/p Epo   -Replaced IV iron   -start Vitamin B12 supplementation  -reduce lab frequency given stable CBC and electrolytes

## 2024-06-27 NOTE — PLAN OF CARE
Problem: Acute Kidney Injury/Impairment  Goal: Fluid and Electrolyte Balance  Outcome: Progressing  Intervention: Monitor and Manage Fluid and Electrolyte Balance  Flowsheets (Taken 6/27/2024 0108)  Fluid/Electrolyte Management: fluids restricted  Goal: Improved Oral Intake  Intervention: Promote and Optimize Oral Intake  Flowsheets (Taken 6/27/2024 0108)  Oral Nutrition Promotion: rest periods promoted  Nutrition Interventions: supplemental drinks provided

## 2024-06-27 NOTE — ASSESSMENT & PLAN NOTE
BG goal 140-180  T2DM.  LVAD. Admitted for worsening renal function with a BUN>100/Cr. 9.9. Hypoglycemia likely secondary to RIVAS (glimepiride) with worsened kidney function.  Required TF, now off.  BG stable.  Will continue to monitor.    Plan:  -Novolog /25  -BG monitoring ac/hs  -Hypoglycemia protocol in place    ** Please call Endocrine for any BG related issues **    Discharge plans:   TBD  -D/C Amaryl

## 2024-06-27 NOTE — SUBJECTIVE & OBJECTIVE
Interval History:   No distress this AM. Electrolytes non emergent. LVAD in placed. Plans for IPR with MWF HD chair. Last HD 6/26.  Oxygenating well on RA.    Review of patient's allergies indicates:  No Known Allergies  Current Facility-Administered Medications   Medication Frequency    0.9%  NaCl infusion (for blood administration) Q24H PRN    0.9%  NaCl infusion PRN    0.9%  NaCl infusion Once    acetaminophen tablet 650 mg Q6H PRN    acetaminophen tablet 650 mg Q6H PRN    amiodarone tablet 400 mg Daily    amoxicillin 400 mg/5 mL suspension 500 mg Daily    atorvastatin tablet 40 mg QHS    dextrose 10% bolus 125 mL 125 mL PRN    dextrose 10% bolus 125 mL 125 mL PRN    dextrose 10% bolus 250 mL 250 mL PRN    dextrose 10% bolus 250 mL 250 mL PRN    epoetin zohaib injection 10,000 Units Every Mon, Wed, Fri    folic acid tablet 1 mg Daily    gabapentin capsule 100 mg BID    glucagon (human recombinant) injection 1 mg PRN    glucose chewable tablet 16 g PRN    glucose chewable tablet 24 g PRN    insulin aspart U-100 pen 0-10 Units QID (AC + HS) PRN    LIDOcaine 5 % patch 2 patch Q24H    midodrine tablet 10 mg Q8H    pantoprazole EC tablet 40 mg Daily    QUEtiapine tablet 100 mg QHS    sevelamer carbonate pwpk 0.8 g TID WM    simethicone chewable tablet 80 mg TID PRN    sodium bicarbonate tablet 650 mg TID    trazodone split tablet 25 mg Nightly PRN    venlafaxine tablet 37.5 mg Daily    vitamin D 1000 units tablet 1,000 Units Daily    warfarin (COUMADIN) tablet 2.5 mg Daily       Objective:     Vital Signs (Most Recent):  Temp: 98.2 °F (36.8 °C) (06/27/24 0814)  Pulse: 88 (06/27/24 0814)  Resp: 18 (06/27/24 0814)  BP: (!) 74/0 (06/27/24 0930)  SpO2: 97 % (06/27/24 0814) Vital Signs (24h Range):  Temp:  [97.6 °F (36.4 °C)-98.8 °F (37.1 °C)] 98.2 °F (36.8 °C)  Pulse:  [79-90] 88  Resp:  [17-18] 18  SpO2:  [97 %-99 %] 97 %  BP: (74-95)/(0-69) 74/0     Weight: 71.4 kg (157 lb 6.5 oz) (06/27/24 0530)  Body mass index is 20.77  kg/m².  Body surface area is 1.92 meters squared.    I/O last 3 completed shifts:  In: 1350 [P.O.:850; Other:500]  Out: 1150 [Urine:150; Other:1000]     Physical Exam  Vitals and nursing note reviewed.   Constitutional:       General: He is sleeping.      Appearance: Normal appearance.   HENT:      Head: Normocephalic and atraumatic.   Eyes:      Conjunctiva/sclera: Conjunctivae normal.      Pupils: Pupils are equal, round, and reactive to light.   Neck:      Comments: RIJ trialysis cath.   Cardiovascular:      Rate and Rhythm: Normal rate and regular rhythm.      Comments: Smooth VAD hum  Pulmonary:      Effort: Pulmonary effort is normal. No respiratory distress.      Breath sounds: Normal breath sounds. No wheezing or rales.   Abdominal:      General: Bowel sounds are normal.      Palpations: Abdomen is soft.   Musculoskeletal:         General: No swelling. Normal range of motion.      Cervical back: Normal range of motion and neck supple.      Right lower leg: No edema.      Left lower leg: No edema.   Skin:     General: Skin is warm and dry.   Neurological:      General: No focal deficit present.          Significant Labs:  CBC:   Recent Labs   Lab 06/27/24  0428   WBC 6.88   RBC 2.93*   HGB 8.3*   HCT 26.3*   *   MCV 90   MCH 28.3   MCHC 31.6*     CMP:   Recent Labs   Lab 06/26/24  0500 06/27/24  0427   GLU 93 133*   CALCIUM 8.4* 8.7   ALBUMIN 2.0*  --    PROT 7.3  --     138   K 4.1 4.2   CO2 25 21*    102   BUN 42* 57*   CREATININE 4.0* 5.8*   ALKPHOS 245*  --    ALT 55*  --    AST 64*  --    BILITOT 0.5  --      All labs within the past 24 hours have been reviewed.

## 2024-06-27 NOTE — ASSESSMENT & PLAN NOTE
- S/p LVAD 12/1/23   - Heart transplant following and LDH is stable overall.   - Continues on Coumadin.   - Not listed for OHTx, declined for OHTX due to comorbidities.

## 2024-06-27 NOTE — PROGRESS NOTES
[facility] - Cardiology Stepdown  Nephrology  Progress Note    Patient Name: Radha Abbott  MRN: [id number]  Admission Date: 6/11/2024  Hospital Length of Stay: 16 days  Attending Provider: Juventino Bermudez Jr.*   Primary Care Physician: Vasu Kong MD  Principal Problem:Acute renal failure superimposed on stage 4 chronic kidney disease    Subjective:     Interval History:   No distress this AM. Electrolytes non emergent. LVAD in placed. Plans for IPR with MWF HD chair. Last HD 6/26.  Oxygenating well on RA.    Review of patient's allergies indicates:  No Known Allergies  Current Facility-Administered Medications   Medication Frequency    0.9%  NaCl infusion (for blood administration) Q24H PRN    0.9%  NaCl infusion PRN    0.9%  NaCl infusion Once    acetaminophen tablet 650 mg Q6H PRN    acetaminophen tablet 650 mg Q6H PRN    amiodarone tablet 400 mg Daily    amoxicillin 400 mg/5 mL suspension 500 mg Daily    atorvastatin tablet 40 mg QHS    dextrose 10% bolus 125 mL 125 mL PRN    dextrose 10% bolus 125 mL 125 mL PRN    dextrose 10% bolus 250 mL 250 mL PRN    dextrose 10% bolus 250 mL 250 mL PRN    epoetin zohaib injection 10,000 Units Every Mon, Wed, Fri    folic acid tablet 1 mg Daily    gabapentin capsule 100 mg BID    glucagon (human recombinant) injection 1 mg PRN    glucose chewable tablet 16 g PRN    glucose chewable tablet 24 g PRN    insulin aspart U-100 pen 0-10 Units QID (AC + HS) PRN    LIDOcaine 5 % patch 2 patch Q24H    midodrine tablet 10 mg Q8H    pantoprazole EC tablet 40 mg Daily    QUEtiapine tablet 100 mg QHS    sevelamer carbonate pwpk 0.8 g TID WM    simethicone chewable tablet 80 mg TID PRN    sodium bicarbonate tablet 650 mg TID    trazodone split tablet 25 mg Nightly PRN    venlafaxine tablet 37.5 mg Daily    vitamin D 1000 units tablet 1,000 Units Daily    warfarin (COUMADIN) tablet 2.5 mg Daily       Objective:     Vital Signs (Most Recent):  Temp: 98.2 °F (36.8 °C) (06/27/24  0814)  Pulse: 88 (06/27/24 0814)  Resp: 18 (06/27/24 0814)  BP: (!) 74/0 (06/27/24 0930)  SpO2: 97 % (06/27/24 0814) Vital Signs (24h Range):  Temp:  [97.6 °F (36.4 °C)-98.8 °F (37.1 °C)] 98.2 °F (36.8 °C)  Pulse:  [79-90] 88  Resp:  [17-18] 18  SpO2:  [97 %-99 %] 97 %  BP: (74-95)/(0-69) 74/0     Weight: 71.4 kg (157 lb 6.5 oz) (06/27/24 0530)  Body mass index is 20.77 kg/m².  Body surface area is 1.92 meters squared.    I/O last 3 completed shifts:  In: 1350 [P.O.:850; Other:500]  Out: 1150 [Urine:150; Other:1000]     Physical Exam  Vitals and nursing note reviewed.   Constitutional:       General: He is sleeping.      Appearance: Normal appearance.   HENT:      Head: Normocephalic and atraumatic.   Eyes:      Conjunctiva/sclera: Conjunctivae normal.      Pupils: Pupils are equal, round, and reactive to light.   Neck:      Comments: RIJ trialysis cath.   Cardiovascular:      Rate and Rhythm: Normal rate and regular rhythm.      Comments: Smooth VAD hum  Pulmonary:      Effort: Pulmonary effort is normal. No respiratory distress.      Breath sounds: Normal breath sounds. No wheezing or rales.   Abdominal:      General: Bowel sounds are normal.      Palpations: Abdomen is soft.   Musculoskeletal:         General: No swelling. Normal range of motion.      Cervical back: Normal range of motion and neck supple.      Right lower leg: No edema.      Left lower leg: No edema.   Skin:     General: Skin is warm and dry.   Neurological:      General: No focal deficit present.          Significant Labs:  CBC:   Recent Labs   Lab 06/27/24 0428   WBC 6.88   RBC 2.93*   HGB 8.3*   HCT 26.3*   *   MCV 90   MCH 28.3   MCHC 31.6*     CMP:   Recent Labs   Lab 06/26/24  0500 06/27/24  0427   GLU 93 133*   CALCIUM 8.4* 8.7   ALBUMIN 2.0*  --    PROT 7.3  --     138   K 4.1 4.2   CO2 25 21*    102   BUN 42* 57*   CREATININE 4.0* 5.8*   ALKPHOS 245*  --    ALT 55*  --    AST 64*  --    BILITOT 0.5  --      All labs  within the past 24 hours have been reviewed.   Assessment/Plan:     Cardiac/Vascular  LVAD (left ventricular assist device) present  -HTS following.    Renal/  * Acute renal failure superimposed on stage 4 chronic kidney disease  Patient with a baseline eGFR that appears to be variable however was noted to be in the 15-20 range in February before his recent IVÁN in May, presents now with fatigue and worsening IVÁN with a creatinine of 9.8 and eGFR of 5.5. BUN was noted to be elevated at 117, other electrolytes were normal. He was noted to have some asterixis on exam (which he states is chronic for him and unchanged from his baseline), however denies any other signs or symptoms of uremia. Bedside bladder scan revealed 200 mL of urine in his bladder and his last bladder void was 10 hours prior to this consult.     SLED initiated 6/13 x4 hours.  Mental status improved.      Recommendations:     -Will continue 3x week dialysis   -Will plan for HD tomorrow for clearance and volume management. Plans to continue MWF dialysis while inpatient. Plans for IPR with MWF HD chair.   -Na bicarb tabs 650 mg po TID  -Avoid nephrotoxic agents (IV contrast, NSAID's, gadolinium contrast, PPI's) if able.   -Strict I's and O's  -Renally dose all medications  -Will need OP HD upon discharge, CM/SW involved  -TDC placed by IR 6/24    Chronic kidney disease-mineral and bone disorder    -low calcium bath  -phos binders with meals  -low phos diet    Oncology  Anemia  -EPO with HD  -Feraheme 6/19; 2nd dose 6/25        Thank you for your consult. I will follow-up with patient. Please contact us if you have any additional questions.    Matilda Hinton, RONI, FNP-C  Nephrology  [facility] - Cardiology Stepdown

## 2024-06-27 NOTE — SUBJECTIVE & OBJECTIVE
Interval History:   -no acute events    Subjective:  -feeling well, no questions    Continuous Infusions:  Scheduled Meds:   amiodarone  400 mg Oral Daily    amoxicillin  500 mg Per NG tube Daily    atorvastatin  40 mg Oral QHS    epoetin zohaib (PROCRIT) injection  10,000 Units Intravenous Every Mon, Wed, Fri    folic acid  1 mg Oral Daily    gabapentin  100 mg Oral BID    LIDOcaine  2 patch Transdermal Q24H    midodrine  10 mg Oral Q8H    pantoprazole  40 mg Oral Daily    QUEtiapine  100 mg Oral QHS    sevelamer carbonate  0.8 g Oral TID WM    sodium bicarbonate  650 mg Oral TID    venlafaxine  37.5 mg Oral Daily    vitamin D  1,000 Units Oral Daily    warfarin  2.5 mg Oral Daily     PRN Meds:  Current Facility-Administered Medications:     0.9%  NaCl infusion (for blood administration), , Intravenous, Q24H PRN    0.9% NaCl, , Intravenous, PRN    acetaminophen, 650 mg, Oral, Q6H PRN    acetaminophen, 650 mg, Per NG tube, Q6H PRN    dextrose 10%, 12.5 g, Intravenous, PRN    dextrose 10%, 12.5 g, Intravenous, PRN    dextrose 10%, 25 g, Intravenous, PRN    dextrose 10%, 25 g, Intravenous, PRN    glucagon (human recombinant), 1 mg, Intramuscular, PRN    glucose, 16 g, Oral, PRN    glucose, 24 g, Oral, PRN    insulin aspart U-100, 0-10 Units, Subcutaneous, QID (AC + HS) PRN    simethicone, 1 tablet, Oral, TID PRN    traZODone, 25 mg, Oral, Nightly PRN    Review of patient's allergies indicates:  No Known Allergies  Objective:     Vital Signs (Most Recent):  Temp: 97.4 °F (36.3 °C) (06/27/24 1042)  Pulse: 82 (06/27/24 1113)  Resp: 18 (06/27/24 1042)  BP: (!) 72/0 (06/27/24 1113)  SpO2: 96 % (06/27/24 1042) Vital Signs (24h Range):  Temp:  [97.4 °F (36.3 °C)-98.8 °F (37.1 °C)] 97.4 °F (36.3 °C)  Pulse:  [79-90] 82  Resp:  [17-18] 18  SpO2:  [96 %-97 %] 96 %  BP: (72-95)/(0-69) 72/0     Patient Vitals for the past 72 hrs (Last 3 readings):   Weight   06/27/24 0530 71.4 kg (157 lb 6.5 oz)   06/26/24 0915 72 kg (158 lb 11.7  oz)   06/26/24 0524 77.5 kg (170 lb 13.7 oz)     Body mass index is 20.77 kg/m².      Intake/Output Summary (Last 24 hours) at 6/27/2024 1302  Last data filed at 6/27/2024 0930  Gross per 24 hour   Intake 440 ml   Output 150 ml   Net 290 ml       Hemodynamic Parameters:       Telemetry: sinus rhythm       Physical Exam  Constitutional:       Appearance: Normal appearance.   Cardiovascular:      Comments: JVP at clavicle; VAD hum  Pulmonary:      Effort: Pulmonary effort is normal.      Breath sounds: Normal breath sounds.   Musculoskeletal:      Right lower leg: No edema.      Left lower leg: No edema.   Skin:     General: Skin is warm and dry.   Neurological:      Mental Status: He is alert.            Significant Labs:  CBC:  Recent Labs   Lab 06/25/24  0407 06/26/24  0500 06/27/24  0428   WBC 6.26 7.52 6.88   RBC 3.02* 2.72* 2.93*   HGB 8.3* 7.5* 8.3*   HCT 25.9* 24.0* 26.3*    460* 475*   MCV 86 88 90   MCH 27.5 27.6 28.3   MCHC 32.0 31.3* 31.6*     BNP:  Recent Labs   Lab 06/21/24  0451 06/24/24  0533 06/26/24  0500   BNP 1,328* 523* 598*     CMP:  Recent Labs   Lab 06/21/24  0451 06/22/24  0232 06/22/24  0808 06/23/24  0456 06/24/24  0533 06/25/24  0407 06/26/24  0500 06/27/24  0427   *   < > 218*   < > 89 134* 93 133*   CALCIUM 8.7   < > 8.7   < > 9.0 9.1 8.4* 8.7   ALBUMIN 2.1*  --  2.1*  --  2.0*  --  2.0*  --    PROT 7.7  --   --   --  7.2  --  7.3  --    *   < > 134*   < > 136 135* 139 138   K 4.0   < > 3.7   < > 4.4 4.7 4.1 4.2   CO2 16*   < > 24   < > 27 22* 25 21*      < > 95   < > 93* 95 101 102   BUN 54*   < > 43*   < > 89* 107* 42* 57*   CREATININE 3.7*   < > 3.0*   < > 6.1* 7.1* 4.0* 5.8*   ALKPHOS 387*  --   --   --  256*  --  245*  --    *  --   --   --  70*  --  55*  --    AST 67*  --   --   --  68*  --  64*  --    BILITOT 0.4  --   --   --  0.5  --  0.5  --     < > = values in this interval not displayed.      Coagulation:   Recent Labs   Lab 06/25/24  1700  06/26/24  0500 06/27/24  0428   INR 2.2* 2.4* 2.4*   APTT 40.1* 43.0* 44.0*     LDH:  Recent Labs   Lab 06/25/24  0407 06/26/24  0500 06/27/24  0427   * 382* 274*     Microbiology:  Microbiology Results (last 7 days)       ** No results found for the last 168 hours. **            I have reviewed all pertinent labs within the past 24 hours.    Estimated Creatinine Clearance: 13.3 mL/min (A) (based on SCr of 5.8 mg/dL (H)).    Diagnostic Results:  I have reviewed and interpreted all pertinent imaging results/findings within the past 24 hours.

## 2024-06-27 NOTE — ASSESSMENT & PLAN NOTE
- Nephrology following for CKD and now plan will be to discharge on MWF HD.   - TDC placed by IR 6/24

## 2024-06-27 NOTE — PLAN OF CARE
Problem: Physical Therapy  Goal: Physical Therapy Goal  Description: Goals to be completed by: 7/14/24    1.Pt will perform sup<>sit transfers w/ contact guard assistance  2.Pt will have sufficient dynamic balance to sit EOB while performing ADLs/therex w/ supervision  3.Pt will be able to stand up from EOB w/ contact guard assistance using LRAD  4.Pt will ambulate 100 feet w/ minimum assistance using LRAD  5.Pt will be independent w/ HEP therex on BLE w/ good form and ROM   Outcome: Progressing   Goals remain appropriate. 6/27/2024

## 2024-06-27 NOTE — ASSESSMENT & PLAN NOTE
- recently completed a 6 week therapy for Enterococcus bacteremia with ampicillin and rocephin ending 5/30/24 and was switched to PO antibiotics.

## 2024-06-27 NOTE — SUBJECTIVE & OBJECTIVE
Past Medical History:   Diagnosis Date    Aspiration pneumonia of both lungs 06/17/2024    CAD (coronary artery disease)     CHF (congestive heart failure)     Delirium 06/16/2024    Diabetes mellitus     HFrEF (heart failure with reduced ejection fraction)     Hypoglycemia 06/12/2024    ICD (implantable cardioverter-defibrillator) in place     MI, old     Type 2 diabetes mellitus without complication, without long-term current use of insulin 10/07/2023     Past Surgical History:   Procedure Laterality Date    ANGIOPLASTY-VENOUS ARTERY Right 12/1/2023    Procedure: ANGIOPLASTY-VENOUS ARTERY, RIGHT FEMORAL;  Surgeon: Yuri Washington MD;  [facility];  Service: Cardiovascular;  Laterality: Right;    AORTIC VALVULOPLASTY N/A 12/1/2023    Procedure: REPAIR, AORTIC VALVE;  Surgeon: Yuri Washington MD;  [facility];  Service: Cardiovascular;  Laterality: N/A;    CARDIAC SURGERY      CLOSURE N/A 12/1/2023    Procedure: CLOSURE, TEMPORARY;  Surgeon: Yuri Washington MD;  [facility];  Service: Cardiovascular;  Laterality: N/A;    DRAINAGE OF PLEURAL EFFUSION  12/4/2023    Procedure: DRAINAGE, PLEURAL EFFUSION;  Surgeon: Yuri Washington MD;  [facility];  Service: Cardiovascular;;    INSERTION OF GRAFT TO PERICARDIUM  12/4/2023    Procedure: INSERTION, GRAFT, PERICARDIUM;  Surgeon: Yuri Washington MD;  [facility];  Service: Cardiovascular;;    INSERTION OF INTRA-AORTIC BALLOON ASSIST DEVICE Right 11/21/2023    Procedure: INSERTION, INTRA-AORTIC BALLOON PUMP;  Surgeon: Finn Cohn MD;  Location: [facility] CATH LAB;  Service: Cardiology;  Laterality: Right;    LEFT VENTRICULAR ASSIST DEVICE Left 12/1/2023    Procedure: INSERTION-LEFT VENTRICULAR ASSIST DEVICE;  Surgeon: Yuri Washington MD;  Location: [facility] OR [address] [place] [address];  Service: Cardiovascular;  Laterality: Left;  REDO STERNOTOMY - REDO SAW NEEDED FOR CASE    LYSIS OF ADHESIONS  12/1/2023    Procedure:  LYSIS, ADHESIONS;  Surgeon: Yuri Washington MD;  [facility];  Service: Cardiovascular;;    PLACEMENT OF SWAN ROLANDO CATHETER WITH IMAGING GUIDANCE  11/20/2023    Procedure: INSERTION, CATHETER, SWAN-ROLANDO, WITH IMAGING GUIDANCE;  Surgeon: Sajan Hurley MD;  Location: [facility] CATH LAB;  Service: Cardiology;;    REMOVAL OF TUNNELED CENTRAL VENOUS CATHETER (CVC) N/A 3/1/2024    Procedure: REMOVAL, CATHETER, CENTRAL VENOUS, TUNNELED;  Surgeon: Seble Aguilar MD;  Location: [facility] CATH LAB;  Service: Interventional Nephrology;  Laterality: N/A;    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 12/1/2023    Procedure: REPAIR, ANEURYSM, ARTERY, FEMORAL;  Surgeon: Yuri Washington MD;  [facility];  Service: Cardiovascular;  Laterality: Right;  Right Femoral Artery Repair    RIGHT HEART CATHETERIZATION Right 10/10/2023    Procedure: INSERTION, CATHETER, RIGHT HEART;  Surgeon: Bin Gandhi MD;  Location: [facility] CATH LAB;  Service: Cardiology;  Laterality: Right;    RIGHT HEART CATHETERIZATION Right 10/13/2023    Procedure: INSERTION, CATHETER, RIGHT HEART;  Surgeon: Walter Mcintyre MD;  Location: [facility] CATH LAB;  Service: Cardiology;  Laterality: Right;    RIGHT HEART CATHETERIZATION  11/13/2023    RIGHT HEART CATHETERIZATION Right 11/13/2023    Procedure: INSERTION, CATHETER, RIGHT HEART;  Surgeon: Juventino Bermudez Jr., MD;  Location: [facility] CATH LAB;  Service: Cardiology;  Laterality: Right;    RIGHT HEART CATHETERIZATION Right 11/20/2023    Procedure: INSERTION, CATHETER, RIGHT HEART;  Surgeon: Sajan Hurley MD;  Location: [facility] CATH LAB;  Service: Cardiology;  Laterality: Right;    RIGHT HEART CATHETERIZATION Right 1/22/2024    Procedure: INSERTION, CATHETER, RIGHT HEART;  Surgeon: Brayan Ocampo MD;  Location: [facility] CATH LAB;  Service: Cardiology;  Laterality: Right;    STERNAL WOUND CLOSURE N/A 12/4/2023    Procedure: CLOSURE, WOUND, STERNUM;  Surgeon: Yuri Washington MD;   Location: [facility] OR 2ND FLR;  Service: Cardiovascular;  Laterality: N/A;    STERNOTOMY N/A 12/1/2023    Procedure: STERNOTOMY, REDO;  Surgeon: Yuri Washington MD;  Location: [facility] OR 2ND FLR;  Service: Cardiovascular;  Laterality: N/A;    VALVULOPLASTY, MITRAL VALVE N/A 12/1/2023    Procedure: VALVULOPLASTY, MITRAL VALVE;  Surgeon: Yuri Washington MD;  Location: [facility] OR [facility] FLR;  Service: Cardiovascular;  Laterality: N/A;     Review of patient's allergies indicates:  No Known Allergies    Scheduled Medications:    amiodarone  400 mg Oral Daily    amoxicillin  500 mg Per NG tube Daily    atorvastatin  40 mg Oral QHS    [START ON 6/28/2024] cyanocobalamin  250 mcg Oral Daily    epoetin zohaib (PROCRIT) injection  10,000 Units Intravenous Every Mon, Wed, Fri    folic acid  1 mg Oral Daily    gabapentin  100 mg Oral BID    LIDOcaine  2 patch Transdermal Q24H    midodrine  10 mg Oral Q8H    pantoprazole  40 mg Oral Daily    QUEtiapine  100 mg Oral QHS    sevelamer carbonate  0.8 g Oral TID WM    sodium bicarbonate  650 mg Oral TID    venlafaxine  37.5 mg Oral Daily    vitamin D  1,000 Units Oral Daily    warfarin  2.5 mg Oral Daily       PRN Medications:   Current Facility-Administered Medications:     0.9%  NaCl infusion (for blood administration), , Intravenous, Q24H PRN    0.9% NaCl, , Intravenous, PRN    acetaminophen, 650 mg, Oral, Q6H PRN    acetaminophen, 650 mg, Per NG tube, Q6H PRN    dextrose 10%, 12.5 g, Intravenous, PRN    dextrose 10%, 12.5 g, Intravenous, PRN    dextrose 10%, 25 g, Intravenous, PRN    dextrose 10%, 25 g, Intravenous, PRN    glucagon (human recombinant), 1 mg, Intramuscular, PRN    glucose, 16 g, Oral, PRN    glucose, 24 g, Oral, PRN    insulin aspart U-100, 0-10 Units, Subcutaneous, QID (AC + HS) PRN    simethicone, 1 tablet, Oral, TID PRN    traZODone, 25 mg, Oral, Nightly PRN    Family History    None       Tobacco Use    Smoking status: Former     Current packs/day: 0.50     Types: Cigarettes     Smokeless tobacco: Not on file   Substance and Sexual Activity    Alcohol use: Yes     Comment: rarely    Drug use: No    Sexual activity: Not Currently     Partners: Female     Review of Systems   Constitutional:  Positive for activity change and fatigue. Negative for fever.   Musculoskeletal:  Positive for gait problem.   Neurological:  Positive for weakness.   Psychiatric/Behavioral:  Negative for agitation, behavioral problems and confusion.      Objective:     Vital Signs (Most Recent):  Temp: 97.4 °F (36.3 °C) (06/27/24 1042)  Pulse: 82 (06/27/24 1113)  Resp: 18 (06/27/24 1042)  BP: (!) 72/0 (06/27/24 1113)  SpO2: 96 % (06/27/24 1042)    Vital Signs (24h Range):  Temp:  [97.4 °F (36.3 °C)-98.8 °F (37.1 °C)] 97.4 °F (36.3 °C)  Pulse:  [79-90] 82  Resp:  [17-18] 18  SpO2:  [96 %-97 %] 96 %  BP: (72-95)/(0-69) 72/0     Body mass index is 20.77 kg/m².     Physical Exam  Vitals and nursing note reviewed.   Constitutional:       Appearance: Normal appearance.      Comments: Drowsy    HENT:      Nose: Nose normal.      Mouth/Throat:      Mouth: Mucous membranes are moist.   Eyes:      Extraocular Movements: Extraocular movements intact.      Pupils: Pupils are equal, round, and reactive to light.   Cardiovascular:      Comments: LVAD  Pulmonary:      Effort: Pulmonary effort is normal. No respiratory distress.   Musculoskeletal:      Comments: Deconditioned and generalized weakness   Skin:     General: Skin is warm.   Neurological:      Mental Status: He is alert.      Motor: Weakness present.      Gait: Gait abnormal.          NEUROLOGICAL EXAMINATION:     CRANIAL NERVES     CN III, IV, VI   Pupils are equal, round, and reactive to light.      Diagnostic Results: Labs: Reviewed  ECG: Reviewed  CT: Reviewed

## 2024-06-27 NOTE — ASSESSMENT & PLAN NOTE
Patient with a baseline eGFR that appears to be variable however was noted to be in the 15-20 range in February before his recent IVÁN in May, presents now with fatigue and worsening IVÁN with a creatinine of 9.8 and eGFR of 5.5. BUN was noted to be elevated at 117, other electrolytes were normal. He was noted to have some asterixis on exam (which he states is chronic for him and unchanged from his baseline), however denies any other signs or symptoms of uremia. Bedside bladder scan revealed 200 mL of urine in his bladder and his last bladder void was 10 hours prior to this consult.     SLED initiated 6/13 x4 hours.  Mental status improved.      Recommendations:     -Will continue 3x week dialysis   -Will plan for HD tomorrow for clearance and volume management. Plans to continue MWF dialysis while inpatient. Plans for IPR with MWF HD chair.   -Na bicarb tabs 650 mg po TID  -Avoid nephrotoxic agents (IV contrast, NSAID's, gadolinium contrast, PPI's) if able.   -Strict I's and O's  -Renally dose all medications  -Will need OP HD upon discharge, CM/SW involved  -TDC placed by IR 6/24

## 2024-06-27 NOTE — HOSPITAL COURSE
6/14/24: Participated w/ OT. Bed mob modA x 2ppl- maxA. Pt ambulated 3 ft EOB>bedside chair to the left with Min x2 regressing to Mod x2 when turning with RW.

## 2024-06-27 NOTE — PROGRESS NOTES
[facility] - Cardiology Stepdown  Endocrinology  Progress Note    Admit Date: 6/11/2024     Reason for Consult: Management of T2DM, Hyperglycemia     Surgical Procedure and Date: HM3 placement as DT (12/1/23)     Diabetes diagnosis year: several years ago    Home Diabetes Medications:  Amaryl 1 mg (recently prescribed by PCP about a week ago)     Lab Results   Component Value Date    HGBA1C 7.4 (H) 04/17/2024       How often checking glucose at home? Once daily in the AM ( prior to starting Amaryl BG in the 300s, since starting Amaryl BG 70-130s)   BG readings on regimen: 70-130s  Hypoglycemia on the regimen?  No  Missed doses on regimen?  No    Diabetes Complications include:     Hypoglycemia , Diabetic nephropathy  , Diabetic chronic kidney disease     , and Diabetic peripheral neuropathy     Complicating diabetes co morbidities:   CHF and CKD      HPI:   Patient is a 64 y.o. male with a diagnosis of stage D HFrEF, ICMP who underwent HM3 placement as DT (12/1/23) w/ a hospital stay complicated by vasoplegia(requiring Giaprezza), debility, malnutrition/impaired swallowing, and renal failure requiring HD (since weaned off) w/ recent discharge for ADHF presented to the ED with wife due to c/o worsening generalized weakness since 6/10. Per wife patient was mostly sleeping during the day despite sleeping 10 hrs at night. Patient also admits decreased appetite and decreased urine output despite taking his medications as prescribed. He has chronic shortness of breath on exertion. Denies confusion, syncope, diarrhea, constipation, N/V, dysuria, or other complaints. Patient had his prior tunnel cath removed used for HD about 2 weeks ago. He completed a 6 week therapy for Enterococcus bacteremia with ampicillin and rocephin ending 5/30/24 and was switched to PO antibiotics. Off note, patient was recently started by his PCP on glimepiride 1mg and unclear of BG trends during the day. Per patient last fasting  on 6/11.  "Patient reports lasting taking the glimepiride on  AM.   Admitted for worsening renal function with a BUN>100/Cr. 9.9. Endocrinology consulted for management of T2DM.          Interval HPI:   Overnight events: No acute events overnight. Patient in room 319/319 A. Blood glucose stable. BG at goal on current insulin regimen (SSI ). Steroid use- None .      Renal function- Abnormal - Cr 5.8   Vasopressors-  None     Diet Soft & Bite Sized (IDDSI Level 6) Fluid - 1500mL; Thin     Eatin%  Nausea: No  Hypoglycemia and intervention: No  Fever: No  TPN and/or TF: No    BP (!) 80/0 (BP Location: Right arm, Patient Position: Lying)   Pulse 87   Temp 98.3 °F (36.8 °C) (Oral)   Resp 18   Ht 6' 1" (1.854 m)   Wt 71.4 kg (157 lb 6.5 oz)   SpO2 97%   BMI 20.77 kg/m²     Labs Reviewed and Include    Recent Labs   Lab 24  0427   *   CALCIUM 8.7      K 4.2   CO2 21*      BUN 57*   CREATININE 5.8*     Lab Results   Component Value Date    WBC 6.88 2024    HGB 8.3 (L) 2024    HCT 26.3 (L) 2024    MCV 90 2024     (H) 2024     No results for input(s): "TSH", "FREET4" in the last 168 hours.  Lab Results   Component Value Date    HGBA1C 7.4 (H) 2024       Nutritional status:   Body mass index is 20.77 kg/m².  Lab Results   Component Value Date    ALBUMIN 2.0 (L) 2024    ALBUMIN 2.0 (L) 2024    ALBUMIN 2.1 (L) 2024     Lab Results   Component Value Date    PREALBUMIN 17 (L) 2024    PREALBUMIN 16 (L) 2024    PREALBUMIN 16 (L) 2024       Estimated Creatinine Clearance: 13.3 mL/min (A) (based on SCr of 5.8 mg/dL (H)).    Accu-Checks  Recent Labs     24  1244 24  1707 24  1906 24  0814 24  1256 24  1713 24  1910 24  0842 24  1252 24  1731   POCTGLUCOSE 154* 124* 180* 161* 196* 158* 229* 155* 162* 154*       Current Medications and/or Treatments Impacting Glycemic " Control  Immunotherapy:    Immunosuppressants       None          Steroids:   Hormones (From admission, onward)      None          Pressors:    Autonomic Drugs (From admission, onward)      Start     Stop Route Frequency Ordered    06/19/24 1415  midodrine tablet 10 mg         -- Oral Every 8 hours 06/19/24 1414          Hyperglycemia/Diabetes Medications:   Antihyperglycemics (From admission, onward)      Start     Stop Route Frequency Ordered    06/23/24 0729  insulin aspart U-100 pen 0-10 Units         -- SubQ Before meals & nightly PRN 06/23/24 0629            ASSESSMENT and PLAN    Cardiac/Vascular  LVAD (left ventricular assist device) present  Managed per primary team  Avoid hypoglycemia        Renal/  * Acute renal failure superimposed on stage 4 chronic kidney disease  Titrate insulin slowly to avoid hypoglycemia as the risk of hypoglycemia increases with decreased creatinine clearance.        Endocrine  Type 2 diabetes mellitus without complication, without long-term current use of insulin  BG goal 140-180  T2DM.  LVAD. Admitted for worsening renal function with a BUN>100/Cr. 9.9. Hypoglycemia likely secondary to RIVAS (glimepiride) with worsened kidney function.  Required TF, now off.  BG stable.  Will continue to monitor.    Plan:  -Novolog /25  -BG monitoring ac/hs  -Hypoglycemia protocol in place    ** Please call Endocrine for any BG related issues **    Discharge plans:   BRANDO  -D/C Camila Wilson PA-C  Endocrinology  [facility] - Cardiology Stepdown

## 2024-06-27 NOTE — CONSULTS
[facility] - Cardiology Stepdown  Physical Medicine & Rehab  Consult Note    Patient Name: Radha Abbott  MRN: [id number]  Admission Date: 6/11/2024  Hospital Length of Stay: 16 days  Attending Physician: Juventino Bermudez Jr.*     Inpatient consult to Physical Medicine & Rehabilitation  Consult performed by: Layne Ramirez NP  Consult requested by:  Juventino Bermudez Jr.*    Collaborating Physician: Beth Saha MD  Reason for Consult:  Assess rehabilitation needs      Consults  Subjective:     Principal Problem: Acute renal failure superimposed on stage 4 chronic kidney disease    HPI: Radha Abbott is a 63-year-old male with PMHx of CAD s/p CABG, DM2, HTN, HLD, Vfib on amio, stage D HFrEF, S/p LVAD 12/1/23 course complicated by acute renal failure requiring HD (tunneled HD catheter removed in early March of this year), and chronic AC with Coumadin, and recently completed a 6 week therapy for Enterococcus bacteremia with ampicillin and rocephin ending 5/30/24 and was switched to PO antibiotics. Patient presenting to [facility] on 6/10/24 for weakness and decreased appetite. CTH revealed no acute intracranial process and no significant change. Heart transplant following and LDH is stable overall. Continues on Coumadin. Not listed for OHTx, declined for OHTX due to comorbidities. Nephrology following for CKD and now plan will be to discharge on MWF HD. TDC placed by IR 6/24     Functional History: Patient lives in [place] with wife in a single story home with 1 step to enter.  Prior to admission, I. DME: none.     Hospital Course:   6/14/24: Participated w/ OT. Bed mob modA x 2ppl- maxA. Pt ambulated 3 ft EOB>bedside chair to the left with Min x2 regressing to Mod x2 when turning with RW.     Past Medical History:   Diagnosis Date    Aspiration pneumonia of both lungs 06/17/2024    CAD (coronary artery disease)     CHF (congestive heart failure)     Delirium 06/16/2024    Diabetes mellitus     HFrEF (heart failure  with reduced ejection fraction)     Hypoglycemia 06/12/2024    ICD (implantable cardioverter-defibrillator) in place     MI, old     Type 2 diabetes mellitus without complication, without long-term current use of insulin 10/07/2023     Past Surgical History:   Procedure Laterality Date    ANGIOPLASTY-VENOUS ARTERY Right 12/1/2023    Procedure: ANGIOPLASTY-VENOUS ARTERY, RIGHT FEMORAL;  Surgeon: Yuri Washington MD;  Location: [facility] OR [facility] FLR;  Service: Cardiovascular;  Laterality: Right;    AORTIC VALVULOPLASTY N/A 12/1/2023    Procedure: REPAIR, AORTIC VALVE;  Surgeon: Yuri Washington MD;  [facility];  Service: Cardiovascular;  Laterality: N/A;    CARDIAC SURGERY      CLOSURE N/A 12/1/2023    Procedure: CLOSURE, TEMPORARY;  Surgeon: Yuri Washington MD;  Location: [facility] OR [facility] FLR;  Service: Cardiovascular;  Laterality: N/A;    DRAINAGE OF PLEURAL EFFUSION  12/4/2023    Procedure: DRAINAGE, PLEURAL EFFUSION;  Surgeon: Yuri Washington MD;  [facility];  Service: Cardiovascular;;    INSERTION OF GRAFT TO PERICARDIUM  12/4/2023    Procedure: INSERTION, GRAFT, PERICARDIUM;  Surgeon: Yuri Washington MD;  [facility];  Service: Cardiovascular;;    INSERTION OF INTRA-AORTIC BALLOON ASSIST DEVICE Right 11/21/2023    Procedure: INSERTION, INTRA-AORTIC BALLOON PUMP;  Surgeon: Finn Cohn MD;  Location: [facility] CATH LAB;  Service: Cardiology;  Laterality: Right;    LEFT VENTRICULAR ASSIST DEVICE Left 12/1/2023    Procedure: INSERTION-LEFT VENTRICULAR ASSIST DEVICE;  Surgeon: Yuri Washington MD;  [facility];  Service: Cardiovascular;  Laterality: Left;  REDO STERNOTOMY - REDO SAW NEEDED FOR CASE    LYSIS OF ADHESIONS  12/1/2023    Procedure: LYSIS, ADHESIONS;  Surgeon: Yuri Washington MD;  [facility];  Service: Cardiovascular;;    PLACEMENT OF SWAN ROLANDO CATHETER WITH IMAGING GUIDANCE  11/20/2023    Procedure: INSERTION, CATHETER, SWAN-ROLANDO, WITH  IMAGING GUIDANCE;  Surgeon: Sajan Hurley MD;  Location: [facility] CATH LAB;  Service: Cardiology;;    REMOVAL OF TUNNELED CENTRAL VENOUS CATHETER (CVC) N/A 3/1/2024    Procedure: REMOVAL, CATHETER, CENTRAL VENOUS, TUNNELED;  Surgeon: Seble Aguilar MD;  Location: [facility] CATH LAB;  Service: Interventional Nephrology;  Laterality: N/A;    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 12/1/2023    Procedure: REPAIR, ANEURYSM, ARTERY, FEMORAL;  Surgeon: Yuri Washington MD;  [facility];  Service: Cardiovascular;  Laterality: Right;  Right Femoral Artery Repair    RIGHT HEART CATHETERIZATION Right 10/10/2023    Procedure: INSERTION, CATHETER, RIGHT HEART;  Surgeon: Bin Gandhi MD;  Location: [facility] CATH LAB;  Service: Cardiology;  Laterality: Right;    RIGHT HEART CATHETERIZATION Right 10/13/2023    Procedure: INSERTION, CATHETER, RIGHT HEART;  Surgeon: Walter Mcintyre MD;  Location: [facility] CATH LAB;  Service: Cardiology;  Laterality: Right;    RIGHT HEART CATHETERIZATION  11/13/2023    RIGHT HEART CATHETERIZATION Right 11/13/2023    Procedure: INSERTION, CATHETER, RIGHT HEART;  Surgeon: Juventino Bermudez Jr., MD;  Location: [facility] CATH LAB;  Service: Cardiology;  Laterality: Right;    RIGHT HEART CATHETERIZATION Right 11/20/2023    Procedure: INSERTION, CATHETER, RIGHT HEART;  Surgeon: Sajan Hurley MD;  Location: [facility] CATH LAB;  Service: Cardiology;  Laterality: Right;    RIGHT HEART CATHETERIZATION Right 1/22/2024    Procedure: INSERTION, CATHETER, RIGHT HEART;  Surgeon: Brayan Ocampo MD;  Location: [facility] CATH LAB;  Service: Cardiology;  Laterality: Right;    STERNAL WOUND CLOSURE N/A 12/4/2023    Procedure: CLOSURE, WOUND, STERNUM;  Surgeon: Yuri Washington MD;  [facility];  Service: Cardiovascular;  Laterality: N/A;    STERNOTOMY N/A 12/1/2023    Procedure: STERNOTOMY, REDO;  Surgeon: Yuri Washington MD;  [facility];  Service: Cardiovascular;  Laterality:  N/A;    VALVULOPLASTY, MITRAL VALVE N/A 12/1/2023    Procedure: VALVULOPLASTY, MITRAL VALVE;  Surgeon: Yuri Washington MD;  Location: [facility] OR [address] [place] [address];  Service: Cardiovascular;  Laterality: N/A;     Review of patient's allergies indicates:  No Known Allergies    Scheduled Medications:    amiodarone  400 mg Oral Daily    amoxicillin  500 mg Per NG tube Daily    atorvastatin  40 mg Oral QHS    [START ON 6/28/2024] cyanocobalamin  250 mcg Oral Daily    epoetin zohaib (PROCRIT) injection  10,000 Units Intravenous Every Mon, Wed, Fri    folic acid  1 mg Oral Daily    gabapentin  100 mg Oral BID    LIDOcaine  2 patch Transdermal Q24H    midodrine  10 mg Oral Q8H    pantoprazole  40 mg Oral Daily    QUEtiapine  100 mg Oral QHS    sevelamer carbonate  0.8 g Oral TID WM    sodium bicarbonate  650 mg Oral TID    venlafaxine  37.5 mg Oral Daily    vitamin D  1,000 Units Oral Daily    warfarin  2.5 mg Oral Daily       PRN Medications:   Current Facility-Administered Medications:     0.9%  NaCl infusion (for blood administration), , Intravenous, Q24H PRN    0.9% NaCl, , Intravenous, PRN    acetaminophen, 650 mg, Oral, Q6H PRN    acetaminophen, 650 mg, Per NG tube, Q6H PRN    dextrose 10%, 12.5 g, Intravenous, PRN    dextrose 10%, 12.5 g, Intravenous, PRN    dextrose 10%, 25 g, Intravenous, PRN    dextrose 10%, 25 g, Intravenous, PRN    glucagon (human recombinant), 1 mg, Intramuscular, PRN    glucose, 16 g, Oral, PRN    glucose, 24 g, Oral, PRN    insulin aspart U-100, 0-10 Units, Subcutaneous, QID (AC + HS) PRN    simethicone, 1 tablet, Oral, TID PRN    traZODone, 25 mg, Oral, Nightly PRN    Family History    None       Tobacco Use    Smoking status: Former     Current packs/day: 0.50     Types: Cigarettes    Smokeless tobacco: Not on file   Substance and Sexual Activity    Alcohol use: Yes     Comment: rarely    Drug use: No    Sexual activity: Not Currently     Partners: Female     Review of Systems   Constitutional:  Positive  for activity change and fatigue. Negative for fever.   Musculoskeletal:  Positive for gait problem.   Neurological:  Positive for weakness.   Psychiatric/Behavioral:  Negative for agitation, behavioral problems and confusion.      Objective:     Vital Signs (Most Recent):  Temp: 97.4 °F (36.3 °C) (06/27/24 1042)  Pulse: 82 (06/27/24 1113)  Resp: 18 (06/27/24 1042)  BP: (!) 72/0 (06/27/24 1113)  SpO2: 96 % (06/27/24 1042)    Vital Signs (24h Range):  Temp:  [97.4 °F (36.3 °C)-98.8 °F (37.1 °C)] 97.4 °F (36.3 °C)  Pulse:  [79-90] 82  Resp:  [17-18] 18  SpO2:  [96 %-97 %] 96 %  BP: (72-95)/(0-69) 72/0     Body mass index is 20.77 kg/m².     Physical Exam  Vitals and nursing note reviewed.   Constitutional:       Appearance: Normal appearance.      Comments: Drowsy    HENT:      Nose: Nose normal.      Mouth/Throat:      Mouth: Mucous membranes are moist.   Eyes:      Extraocular Movements: Extraocular movements intact.      Pupils: Pupils are equal, round, and reactive to light.   Cardiovascular:      Comments: LVAD  Pulmonary:      Effort: Pulmonary effort is normal. No respiratory distress.   Musculoskeletal:      Comments: Deconditioned and generalized weakness   Skin:     General: Skin is warm.   Neurological:      Mental Status: He is alert.      Motor: Weakness present.      Gait: Gait abnormal.     Diagnostic Results:   Labs: Reviewed  ECG: Reviewed  CT: Reviewed    Assessment/Plan:     * Acute renal failure superimposed on stage 4 chronic kidney disease  - Nephrology following for CKD and now plan will be to discharge on MWF HD.   - TDC placed by IR 6/24     Bacteremia due to Enterococcus  - recently completed a 6 week therapy for Enterococcus bacteremia with ampicillin and rocephin ending 5/30/24 and was switched to PO antibiotics.     Impaired mobility  - Related to prolonged/acute hospital course.     Recommendations  -  Encourage mobility, OOB in chair at least 3 hours per day, and early ambulation as  appropriate  -  PT/OT evaluate and treat  -  Pain management  -  Monitor for and prevent skin breakdown and pressure ulcers  Early mobility, repositioning/weight shifting every 20-30 minutes when sitting, turn patient every 2 hours, proper mattress/overlay and chair cushioning, pressure relief/heel protector boots  -  DVT prophylaxis    -  Reviewed discharge options (IP rehab, SNF, HH therapy, and OP therapy)     LVAD (left ventricular assist device) present  - S/p LVAD 12/1/23   - Heart transplant following and LDH is stable overall.   - Continues on Coumadin.   - Not listed for OHTx, declined for OHTX due to comorbidities.     PM&R Recommendation:     At this time, the PM&R team has reviewed this patient's ongoing medical case including inpatient diagnosis, medical history, clinical examination, labs, vitals, current social and functional history to provide the post-acute recommendation as follows:     RECOMMENDATIONS: inpatient rehabilitation due to good motivation/participation with therapies, has been determined to tolerate 3 hours of therapy and good potential for recovery.     The patient will be admitted for comprehensive interdisciplinary inpatient rehabilitation to address the impairments due to medical diagnosis of Debility. The patient will benefit from an inpatient rehabilitation program to promote functional recovery, implement compensatory strategies and will undergo assessment for needs for durable medical equipment for safe discharge to the community. This patient will benefit from a coordinated interdisciplinary rehabilitation program services that require close monitoring and treatment with 24-hour rehabilitative nursing and physical/occupational therapies for 3 hours/day for 5 days/week.This interdisciplinary program will be performed under the direction of a physiatrist.    MEDICAL STABILITY:     At this time, no barriers for post-acute rehab admission.     We will continue to follow.      Thank you for your consult.     Layne Ramirez NP  Department of Physical Medicine & Rehab  [facility] - [facility]

## 2024-06-27 NOTE — SUBJECTIVE & OBJECTIVE
Subjective:     Post-Op Info:  * No surgery found *              Interval History: no acute changes. Awaiting HD placement prior to discharge.       Medications:  Continuous Infusions:  Scheduled Meds:   amiodarone  400 mg Oral Daily    amoxicillin  500 mg Per NG tube Daily    atorvastatin  40 mg Oral QHS    epoetin zohaib (PROCRIT) injection  10,000 Units Intravenous Every Mon, Wed, Fri    folic acid  1 mg Oral Daily    gabapentin  100 mg Oral BID    LIDOcaine  2 patch Transdermal Q24H    midodrine  10 mg Oral Q8H    pantoprazole  40 mg Oral Daily    QUEtiapine  100 mg Oral QHS    sevelamer carbonate  0.8 g Oral TID WM    sodium bicarbonate  650 mg Oral TID    venlafaxine  37.5 mg Oral Daily    vitamin D  1,000 Units Oral Daily    warfarin  2.5 mg Oral Daily     PRN Meds:  Current Facility-Administered Medications:     0.9%  NaCl infusion (for blood administration), , Intravenous, Q24H PRN    0.9% NaCl, , Intravenous, PRN    acetaminophen, 650 mg, Oral, Q6H PRN    acetaminophen, 650 mg, Per NG tube, Q6H PRN    dextrose 10%, 12.5 g, Intravenous, PRN    dextrose 10%, 12.5 g, Intravenous, PRN    dextrose 10%, 25 g, Intravenous, PRN    dextrose 10%, 25 g, Intravenous, PRN    glucagon (human recombinant), 1 mg, Intramuscular, PRN    glucose, 16 g, Oral, PRN    glucose, 24 g, Oral, PRN    insulin aspart U-100, 0-10 Units, Subcutaneous, QID (AC + HS) PRN    simethicone, 1 tablet, Oral, TID PRN    traZODone, 25 mg, Oral, Nightly PRN     Objective:     Vital Signs (Most Recent):  Temp: 97.4 °F (36.3 °C) (06/27/24 1042)  Pulse: 82 (06/27/24 1113)  Resp: 18 (06/27/24 1042)  BP: (!) 74/0 (06/27/24 0930)  SpO2: 96 % (06/27/24 1042) Vital Signs (24h Range):  Temp:  [97.4 °F (36.3 °C)-98.8 °F (37.1 °C)] 97.4 °F (36.3 °C)  Pulse:  [79-90] 82  Resp:  [17-18] 18  SpO2:  [96 %-97 %] 96 %  BP: (74-95)/(0-69) 74/0       Intake/Output Summary (Last 24 hours) at 6/27/2024 1157  Last data filed at 6/27/2024 0930  Gross per 24 hour   Intake  560 ml   Output 150 ml   Net 410 ml       Physical Exam  Constitutional:       Appearance: Normal appearance.   HENT:      Head: Normocephalic.   Eyes:      Extraocular Movements: Extraocular movements intact.   Cardiovascular:      Rate and Rhythm: Normal rate and regular rhythm.      Comments: LVAD hum is smooth  Pulmonary:      Effort: Pulmonary effort is normal.      Breath sounds: Normal breath sounds.   Abdominal:      General: Abdomen is flat.      Palpations: Abdomen is soft.   Skin:     General: Skin is warm and dry.   Neurological:      General: No focal deficit present.      Mental Status: He is alert.         Significant Labs:  BMP:   Recent Labs   Lab 06/27/24 0427   *      K 4.2      CO2 21*   BUN 57*   CREATININE 5.8*   CALCIUM 8.7   MG 2.2     CBC:   Recent Labs   Lab 06/27/24 0428   WBC 6.88   RBC 2.93*   HGB 8.3*   HCT 26.3*   *   MCV 90   MCH 28.3   MCHC 31.6*     Coagulation:   Recent Labs   Lab 06/27/24 0428   INR 2.4*   APTT 44.0*     LFTs:   Recent Labs   Lab 06/26/24  0500   ALT 55*   AST 64*   ALKPHOS 245*   BILITOT 0.5   PROT 7.3   ALBUMIN 2.0*       Significant Diagnostics:  I have reviewed all pertinent imaging results/findings within the past 24 hours.    Procedure: Device Interrogation Including analysis of device parameters  Current Settings: Ventricular Assist Device  Review of device function is stable      6/27/2024     9:34 AM 6/27/2024     4:00 AM 6/27/2024    12:10 AM 6/26/2024     7:51 PM 6/26/2024     3:46 PM 6/26/2024    12:00 PM 6/26/2024     8:00 AM   TXP LVAD INTERROGATIONS   Type HeartMate3 HeartMate3 HeartMate3 HeartMate3 HeartMate3 HeartMate3 HeartMate3   Flow 4.3 4.2 3.9 3.9 4.2 3.9 4.1   Speed 5000 5000 5050 5000 5000 5000 5000   PI 4 3.7 4.4 5.2 4.2 4.7 4.1   Power ([place]) 3.4 3.5 3.4 3.4 3.4 3.5 3.5   LSL  4600 4600 4600 4600 4600 4600   Low Flow Alarm     none none none   High Power Alarm     none none none   Pulsatility  Intermittent  pulse Intermittent pulse Intermittent pulse Pulse Pulse Pulse

## 2024-06-27 NOTE — SUBJECTIVE & OBJECTIVE
"Interval HPI:   Overnight events: No acute events overnight. Patient in room 319/319 A. Blood glucose stable. BG at goal on current insulin regimen (SSI ). Steroid use- None .      Renal function- Abnormal - Cr 5.8   Vasopressors-  None     Diet Soft & Bite Sized (IDDSI Level 6) Fluid - 1500mL; Thin     Eatin%  Nausea: No  Hypoglycemia and intervention: No  Fever: No  TPN and/or TF: No    BP (!) 80/0 (BP Location: Right arm, Patient Position: Lying)   Pulse 87   Temp 98.3 °F (36.8 °C) (Oral)   Resp 18   Ht 6' 1" (1.854 m)   Wt 71.4 kg (157 lb 6.5 oz)   SpO2 97%   BMI 20.77 kg/m²     Labs Reviewed and Include    Recent Labs   Lab 24  0427   *   CALCIUM 8.7      K 4.2   CO2 21*      BUN 57*   CREATININE 5.8*     Lab Results   Component Value Date    WBC 6.88 2024    HGB 8.3 (L) 2024    HCT 26.3 (L) 2024    MCV 90 2024     (H) 2024     No results for input(s): "TSH", "FREET4" in the last 168 hours.  Lab Results   Component Value Date    HGBA1C 7.4 (H) 2024       Nutritional status:   Body mass index is 20.77 kg/m².  Lab Results   Component Value Date    ALBUMIN 2.0 (L) 2024    ALBUMIN 2.0 (L) 2024    ALBUMIN 2.1 (L) 2024     Lab Results   Component Value Date    PREALBUMIN 17 (L) 2024    PREALBUMIN 16 (L) 2024    PREALBUMIN 16 (L) 2024       Estimated Creatinine Clearance: 13.3 mL/min (A) (based on SCr of 5.8 mg/dL (H)).    Accu-Checks  Recent Labs     24  1244 24  1707 24  1906 24  0814 24  1256 24  1713 24  1910 24  0842 24  1252 24  1731   POCTGLUCOSE 154* 124* 180* 161* 196* 158* 229* 155* 162* 154*       Current Medications and/or Treatments Impacting Glycemic Control  Immunotherapy:    Immunosuppressants       None          Steroids:   Hormones (From admission, onward)      None          Pressors:    Autonomic Drugs (From admission, " onward)      Start     Stop Route Frequency Ordered    06/19/24 1415  midodrine tablet 10 mg         -- Oral Every 8 hours 06/19/24 1414          Hyperglycemia/Diabetes Medications:   Antihyperglycemics (From admission, onward)      Start     Stop Route Frequency Ordered    06/23/24 0729  insulin aspart U-100 pen 0-10 Units         -- SubQ Before meals & nightly PRN 06/23/24 0698

## 2024-06-27 NOTE — ASSESSMENT & PLAN NOTE
-HeartMate 3 Implanted  12/1/2023  as DT  -Cont Coumadin, dosing by PharmD.  Goal INR 2.0-3.0. therapeutic today.   -Antiplatelets Not on ASA  -LDH is stable overall today. Will continue to monitor daily.  -Speed set at  5000     -Interrogation notable for no events  -Not listed for OHTx, declined for OHTX due to comorbidities     Procedure: Device Interrogation Including analysis of device parameters  Current Settings: Ventricular Assist Device  Review of device function is stable        6/27/2024     9:34 AM 6/27/2024     4:00 AM 6/27/2024    12:10 AM 6/26/2024     7:51 PM 6/26/2024     3:46 PM 6/26/2024    12:00 PM 6/26/2024     8:00 AM   TXP LVAD INTERROGATIONS   Type HeartMate3 HeartMate3 HeartMate3 HeartMate3 HeartMate3 HeartMate3 HeartMate3   Flow 4.3 4.2 3.9 3.9 4.2 3.9 4.1   Speed 5000 5000 5050 5000 5000 5000 5000   PI 4 3.7 4.4 5.2 4.2 4.7 4.1   Power ([place]) 3.4 3.5 3.4 3.4 3.4 3.5 3.5   LSL  4600 4600 4600 4600 4600 4600   Low Flow Alarm     none none none   High Power Alarm     none none none   Pulsatility  Intermittent pulse Intermittent pulse Intermittent pulse Pulse Pulse Pulse

## 2024-06-27 NOTE — PROGRESS NOTES
[facility] - Cardiology Stepdown  Heart Transplant  Progress Note    Patient Name: Radha Abbott  MRN: [id number]  Admission Date: 6/11/2024  Hospital Length of Stay: 16 days  Attending Physician: Juventino Bermudez Jr.*  Primary Care Provider: Vasu Kong MD  Principal Problem:Acute renal failure superimposed on stage 4 chronic kidney disease    Subjective:   Interval History:   -no acute events    Subjective:  -feeling well, no questions    Continuous Infusions:  Scheduled Meds:   amiodarone  400 mg Oral Daily    amoxicillin  500 mg Per NG tube Daily    atorvastatin  40 mg Oral QHS    epoetin zohaib (PROCRIT) injection  10,000 Units Intravenous Every Mon, Wed, Fri    folic acid  1 mg Oral Daily    gabapentin  100 mg Oral BID    LIDOcaine  2 patch Transdermal Q24H    midodrine  10 mg Oral Q8H    pantoprazole  40 mg Oral Daily    QUEtiapine  100 mg Oral QHS    sevelamer carbonate  0.8 g Oral TID WM    sodium bicarbonate  650 mg Oral TID    venlafaxine  37.5 mg Oral Daily    vitamin D  1,000 Units Oral Daily    warfarin  2.5 mg Oral Daily     PRN Meds:  Current Facility-Administered Medications:     0.9%  NaCl infusion (for blood administration), , Intravenous, Q24H PRN    0.9% NaCl, , Intravenous, PRN    acetaminophen, 650 mg, Oral, Q6H PRN    acetaminophen, 650 mg, Per NG tube, Q6H PRN    dextrose 10%, 12.5 g, Intravenous, PRN    dextrose 10%, 12.5 g, Intravenous, PRN    dextrose 10%, 25 g, Intravenous, PRN    dextrose 10%, 25 g, Intravenous, PRN    glucagon (human recombinant), 1 mg, Intramuscular, PRN    glucose, 16 g, Oral, PRN    glucose, 24 g, Oral, PRN    insulin aspart U-100, 0-10 Units, Subcutaneous, QID (AC + HS) PRN    simethicone, 1 tablet, Oral, TID PRN    traZODone, 25 mg, Oral, Nightly PRN    Review of patient's allergies indicates:  No Known Allergies  Objective:     Vital Signs (Most Recent):  Temp: 97.4 °F (36.3 °C) (06/27/24 1042)  Pulse: 82 (06/27/24 1113)  Resp: 18 (06/27/24 1042)  BP: (!)  72/0 (06/27/24 1113)  SpO2: 96 % (06/27/24 1042) Vital Signs (24h Range):  Temp:  [97.4 °F (36.3 °C)-98.8 °F (37.1 °C)] 97.4 °F (36.3 °C)  Pulse:  [79-90] 82  Resp:  [17-18] 18  SpO2:  [96 %-97 %] 96 %  BP: (72-95)/(0-69) 72/0     Patient Vitals for the past 72 hrs (Last 3 readings):   Weight   06/27/24 0530 71.4 kg (157 lb 6.5 oz)   06/26/24 0915 72 kg (158 lb 11.7 oz)   06/26/24 0524 77.5 kg (170 lb 13.7 oz)     Body mass index is 20.77 kg/m².      Intake/Output Summary (Last 24 hours) at 6/27/2024 1302  Last data filed at 6/27/2024 0930  Gross per 24 hour   Intake 440 ml   Output 150 ml   Net 290 ml       Hemodynamic Parameters:       Telemetry: sinus rhythm       Physical Exam  Constitutional:       Appearance: Normal appearance.   Cardiovascular:      Comments: JVP at clavicle; VAD hum  Pulmonary:      Effort: Pulmonary effort is normal.      Breath sounds: Normal breath sounds.   Musculoskeletal:      Right lower leg: No edema.      Left lower leg: No edema.   Skin:     General: Skin is warm and dry.   Neurological:      Mental Status: He is alert.            Significant Labs:  CBC:  Recent Labs   Lab 06/25/24  0407 06/26/24  0500 06/27/24  0428   WBC 6.26 7.52 6.88   RBC 3.02* 2.72* 2.93*   HGB 8.3* 7.5* 8.3*   HCT 25.9* 24.0* 26.3*    460* 475*   MCV 86 88 90   MCH 27.5 27.6 28.3   MCHC 32.0 31.3* 31.6*     BNP:  Recent Labs   Lab 06/21/24  0451 06/24/24  0533 06/26/24  0500   BNP 1,328* 523* 598*     CMP:  Recent Labs   Lab 06/21/24  0451 06/22/24  0232 06/22/24  0808 06/23/24  0456 06/24/24  0533 06/25/24  0407 06/26/24  0500 06/27/24  0427   *   < > 218*   < > 89 134* 93 133*   CALCIUM 8.7   < > 8.7   < > 9.0 9.1 8.4* 8.7   ALBUMIN 2.1*  --  2.1*  --  2.0*  --  2.0*  --    PROT 7.7  --   --   --  7.2  --  7.3  --    *   < > 134*   < > 136 135* 139 138   K 4.0   < > 3.7   < > 4.4 4.7 4.1 4.2   CO2 16*   < > 24   < > 27 22* 25 21*      < > 95   < > 93* 95 101 102   BUN 54*   < >  43*   < > 89* 107* 42* 57*   CREATININE 3.7*   < > 3.0*   < > 6.1* 7.1* 4.0* 5.8*   ALKPHOS 387*  --   --   --  256*  --  245*  --    *  --   --   --  70*  --  55*  --    AST 67*  --   --   --  68*  --  64*  --    BILITOT 0.4  --   --   --  0.5  --  0.5  --     < > = values in this interval not displayed.      Coagulation:   Recent Labs   Lab 06/25/24  0407 06/26/24  0500 06/27/24  0428   INR 2.2* 2.4* 2.4*   APTT 40.1* 43.0* 44.0*     LDH:  Recent Labs   Lab 06/25/24  0407 06/26/24  0500 06/27/24  0427   * 382* 274*     Microbiology:  Microbiology Results (last 7 days)       ** No results found for the last 168 hours. **            I have reviewed all pertinent labs within the past 24 hours.    Estimated Creatinine Clearance: 13.3 mL/min (A) (based on SCr of 5.8 mg/dL (H)).    Diagnostic Results:  I have reviewed and interpreted all pertinent imaging results/findings within the past 24 hours.  Assessment and Plan:     Mr. Radha Abbott is a 63 yo male with stage D HFrEF, ICMP who underwent HM3 placement as DT (12/1/23) w/ a hospital stay complicated by vasoplegia(requiring Giaprezza), debility, malnutrition/impaired swallowing, and renal failure requiring HD (since weaned off) w/ recent discharge for ADHF presented to the ED with wife due to c/o worsening generalized weakness since 6/10. Per wife patient was mostly sleeping during the day despite sleeping 10 hrs at night. Patient also admits decreased appetite and decreased urine output despite taking his medications as prescribed. He has not urinated since 6/11 at 2pm. He has chronic shortness of breath on exertion. Denies confusion, syncope, diarrhea, constipation, N/V, dysuria, or other complaints. Patient had his prior tunnel cath removed used for HD about 2 weeks ago. He completed a 6 week therapy for Enterococcus bacteremia with ampicillin and rocephin ending 5/30/24a and was switched to PO antibiotics.     On prior hospital stay nephrology  consulted during hospital stay due to elevated renal function. They feel he is close to being a dialysis patient. He diuresed on IV Bumex with prn Metolazone. He is net negative 1.2L throughout his hospital stay and weight on day of discharge was 179lbs down from 186lbs on admit. We continued his home dose of Bumex 4mg BID but increased his prn Metolazone to 10mg. He received a dose of Epo while inpatient and he has plans to follow up with Heme/Onc to get outpatient therapy arranged.      2D Echo with CFD done on 3/26/2024  LVAD: There is a Heartmate III LVAD running at 5000 RPM. The aortic valve does not open. The ventricular septum is at midline. Inflow cannula well seated at the apex. Outflow graft not visualized.   Left Ventricle: The left ventricle is moderately dilated. Normal wall thickness. Global hypokinesis present. Septal motion is abnormal. There is severely reduced systolic function with a visually estimated ejection fraction of 5 - 10%. There is diastolic dysfunction but grade cannot be determined.   Right Ventricle: Mild right ventricular enlargement. Wall thickness is normal. Right ventricle wall motion is normal. Systolic function is normal. Pacemaker lead present in the ventricle.   Left Atrium: Left atrium is severely dilated.   Right Atrium: Right atrium is moderately dilated.   Mitral Valve: The mitral valve is repaired with an Noble stitch. There is mild regurgitation.   IVC/SVC: Normal venous pressure at 3 mmHg.              * Acute renal failure superimposed on stage 4 chronic kidney disease  Mr. Radha Abbott is a 65 yo male with stage D HFrEF, ICMP who underwent HM3 placement as DT, DM2, CKD4, HTN, E.faecalis bacteremia who presented due to c/o worsening generalized weakness since 6/10. Admitted for worsening renal function with a BUN>100/Cr. 9.9.     - CT Ab/pel: Kidneys/ Ureters: Normal in size and location.  Mild bilateral perinephric stranding, a nonspecific finding and similar to  prior.  No hydronephrosis or nephrolithiasis. No ureteral dilatation.  - tunneled line placed 6/24 for HD   - HD per nephro   - Will need OP HD upon discharge, recommend CM/SW involvement to start workup.  - Renally dosing medications      Bacteremia due to Enterococcus  He completed a 6 week therapy for Enterococcus bacteremia with ampicillin and rocephin ending 5/30/24a and was switched to Amoxicillin for suppression    Anticoagulant long-term use  Cont warfarin    Anemia  Chronic with no acute bleeding, multifactorial 2/2 CKD4 and chronic anticoagulation.  Low-normal Vitamin B12 and elevated MMA c/w Vitamin B12 deficiency.  -s/p Epo   -Replaced IV iron   -start Vitamin B12 supplementation  -reduce lab frequency given stable CBC and electrolytes    Adjustment disorder with depressed mood  Cont SSRI    LVAD (left ventricular assist device) present  -HeartMate 3 Implanted  12/1/2023  as DT  -Cont Coumadin, dosing by PharmD.  Goal INR 2.0-3.0. therapeutic today.   -Antiplatelets Not on ASA  -LDH is stable overall today. Will continue to monitor daily.  -Speed set at  5000     -Interrogation notable for no events  -Not listed for OHTx, declined for OHTX due to comorbidities     Procedure: Device Interrogation Including analysis of device parameters  Current Settings: Ventricular Assist Device  Review of device function is stable        6/27/2024     9:34 AM 6/27/2024     4:00 AM 6/27/2024    12:10 AM 6/26/2024     7:51 PM 6/26/2024     3:46 PM 6/26/2024    12:00 PM 6/26/2024     8:00 AM   TXP LVAD INTERROGATIONS   Type HeartMate3 HeartMate3 HeartMate3 HeartMate3 HeartMate3 HeartMate3 HeartMate3   Flow 4.3 4.2 3.9 3.9 4.2 3.9 4.1   Speed 5000 5000 5050 5000 5000 5000 5000   PI 4 3.7 4.4 5.2 4.2 4.7 4.1   Power ([place]) 3.4 3.5 3.4 3.4 3.4 3.5 3.5   LSL  4600 4600 4600 4600 4600 4600   Low Flow Alarm     none none none   High Power Alarm     none none none   Pulsatility  Intermittent pulse Intermittent pulse Intermittent  pulse Pulse Pulse Pulse         PAF (paroxysmal atrial fibrillation)  -Continue amiodarone and Coumadin      Type 2 diabetes mellitus without complication, without long-term current use of insulin  -Endocrine    Acute on chronic combined systolic and diastolic heart failure  Owing to his advanced kidney disease and significantly elevated creat, he is not on a  ACEI/ARB/ARNI or MRA. He is also not on a beta blocker due to RV dysfunction.     Plan:   -Volume removal with HD   - See LVAD  - See IVÁN    CAD (coronary artery disease)  -Continue statin        Mariann Bee MD  Heart Transplant  [facility] - Cardiology Stepdown

## 2024-06-27 NOTE — PT/OT/SLP PROGRESS
Physical Therapy  co-Treatment with OT    Patient Name:  Radha Abbott   MRN:  [id number]    Recommendations:     Discharge Recommendations: High Intensity Therapy  Discharge Equipment Recommendations: none  Barriers to discharge: Decreased caregiver support    Assessment:     Radha Abbott is a 64 y.o. male admitted with a medical diagnosis of Acute renal failure superimposed on stage 4 chronic kidney disease.  He presents with the following impairments/functional limitations: weakness, impaired endurance, impaired functional mobility, gait instability, impaired balance, decreased safety awareness pt tolerated treatment better being able gait training farther. Pt will benefit from cont skilled PT 4x/wk to progress physically.  Pt is significantly below previous functional level, increased risk of falls and increased burden of care currently. Patient has demonstrated sufficient progression to warrant high intensity therapy evidenced by objectives noted below. LVAD placement 12/2023.      Rehab Prognosis: Good; patient would benefit from acute skilled PT services to address these deficits and reach maximum level of function.    Recent Surgery: * No surgery found *      Plan:     During this hospitalization, patient to be seen 4 x/week to address the identified rehab impairments via gait training, therapeutic activities, therapeutic exercises and progress toward the following goals:    Plan of Care Expires:  07/13/24    Subjective     Chief Complaint: pt c/o legs being weak.   Patient/Family Comments/goals: to get better and go home.   Pain/Comfort:  Pain Rating 1: 0/10  Pain Rating Post-Intervention 1: 0/10      Objective:     Communicated with RN  prior to session.  Patient found supine with telemetry, LVAD (hep lock IV) upon PT entry to room.     General Precautions: Standard, LVAD, fall  Orthopedic Precautions: N/A  Braces: N/A  Respiratory Status: Room air     Functional Mobility:  Bed Mobility:   pt needed verbal cues  for hand placement and sequencing for functional mobility.   Rolling Left:  minimum assistance  Supine to Sit: minimum assistance    Transfers:     Sit to Stand:  minimum assistance and of 2 persons with rolling walker. Pt stood x 2 reps.     Gait: pt received gait training ~ 6 ft x 2 reps with RW and min assist. Pt had sitting rest period between distances gait trained. Pt was followed by chair for safety.     Due to pt complex medical condition, the skill of 2 licensed therapists is needed to maximize treatment session and progression towards goals  Pt white board updated with current therapists name and level of mobility assistance needed.       AM-PAC 6 CLICK MOBILITY  Turning over in bed (including adjusting bedclothes, sheets and blankets)?: 3  Sitting down on and standing up from a chair with arms (e.g., wheelchair, bedside commode, etc.): 3  Moving from lying on back to sitting on the side of the bed?: 3  Moving to and from a bed to a chair (including a wheelchair)?: 3  Need to walk in hospital room?: 3  Climbing 3-5 steps with a railing?: 2  Basic Mobility Total Score: 17       Treatment & Education:  Pt received verbal instructions in PT POC and pt verbally expressed understanding of such.     Patient left up in chair with all lines intact and call button in reach..    GOALS:   Multidisciplinary Problems       Physical Therapy Goals          Problem: Physical Therapy    Goal Priority Disciplines Outcome Goal Variances Interventions   Physical Therapy Goal     PT, PT/OT Progressing     Description: Goals to be completed by: 7/14/24    1.Pt will perform sup<>sit transfers w/ contact guard assistance  2.Pt will have sufficient dynamic balance to sit EOB while performing ADLs/therex w/ supervision  3.Pt will be able to stand up from EOB w/ contact guard assistance using LRAD  4.Pt will ambulate 100 feet w/ minimum assistance using LRAD  5.Pt will be independent w/ HEP therex on BLE w/ good form and ROM                         Time Tracking:     PT Received On: 06/27/24  PT Start Time: 1111     PT Stop Time: 1128  PT Total Time (min): 17 min     Billable Minutes: Gait Training 17 min        PT/PTA: PT     Number of PTA visits since last PT visit: 0     06/27/2024

## 2024-06-27 NOTE — PT/OT/SLP PROGRESS
Occupational Therapy   Co-Treatment  co-treatment performed this date due to need for 2 skilled therapists in order to ensure pt safety, accomodate for pt activity tolerance, and maximize functional potential    Name: Radha Abbott  MRN: [id number]  Admitting Diagnosis:  Acute renal failure superimposed on stage 4 chronic kidney disease       Recommendations:     Discharge Recommendations: High Intensity Therapy  Discharge Equipment Recommendations:  walker, rolling  Barriers to discharge:  None    Assessment:     Radha Abbott is a 64 y.o. male with a medical diagnosis of Acute renal failure superimposed on stage 4 chronic kidney disease.  He presents with performance deficits affecting function are weakness, impaired endurance, impaired self care skills, impaired functional mobility, impaired balance, gait instability, decreased safety awareness. Pt tolerated tx well, with good effort throughout. Pt will continue to benefit from skilled OT services to address impairments listed above to maximize independence with ADLs and functional mobility to ensure safe return to PLOF.     Rehab Prognosis:  Good; patient would benefit from acute skilled OT services to address these deficits and reach maximum level of function.       Plan:     Patient to be seen 4 x/week to address the above listed problems via self-care/home management, therapeutic activities, therapeutic exercises, neuromuscular re-education  Plan of Care Expires: 07/14/24  Plan of Care Reviewed with: patient    Subjective     Chief Complaint: none  Patient/Family Comments/goals: go to rehab  Pain/Comfort:  Pain Rating 1: 0/10  Pain Rating Post-Intervention 1: 0/10    Objective:     Communicated with: RN prior to session.  Patient found HOB elevated with telemetry, LVAD upon OT entry to room.    General Precautions: Standard, fall, LVAD    Orthopedic Precautions:N/A  Braces: N/A  Respiratory Status: Room air     Occupational Performance:     Bed Mobility:    Patient  completed Rolling/Turning to Left with  contact guard assistance  Patient completed Scooting/Bridging with contact guard assistance and increased time  Patient completed Supine to Sit with minimum assistance for trunk mgmt    Functional Mobility/Transfers:  Patient completed Sit <> Stand Transfer with minimum assistance and of 2 persons  with  rolling walker with cues to stand upright, pt initially with posterior lean. Pt also required assistance for controlled descent for stand>sit  Patient completed Bed <> Chair Transfer using Step Transfer technique with minimum assistance with rolling walker  Functional Mobility: Pt walked ~6ft x 2 with Min A and RW, chair follow for safety    Activities of Daily Living:  Pt declined all ADLs    [facility] 6 Click ADL: 15    Treatment & Education:  Pt educated on   Role of OT and OT POC  Safe transfer techniques and proper body mechanics for fall prevention and improved independence with functional transfers  Importance of OOB activities to increase endurance and tolerance for increased participation in daily ADLs    Utilizing the call bell to request for assistance with all functional mobility to ensure safety during hospital stay.    Pt verbalized understanding and all questions were addressed within the scope of OT.     Patient left up in chair with all lines intact and call button in reach    GOALS:   Multidisciplinary Problems       Occupational Therapy Goals          Problem: Occupational Therapy    Goal Priority Disciplines Outcome Interventions   Occupational Therapy Goal     OT, PT/OT Progressing    Description: Goals to be met by: 7/14/24     Patient will increase functional independence with ADLs by performing:    UE Dressing with Set-up Assistance.  LE Dressing with Minimal Assistance.  Grooming while standing at sink with Stand-by Assistance.  Toileting from toilet/bedside commode with Minimal Assistance for hygiene and clothing management.   Supine to sit with Stand-by  Assistance.  Step transfer with Contact Guard Assistance  Toilet transfer to toilet/bedside commode with Contact Guard Assistance.                         Time Tracking:     OT Date of Treatment: 06/27/24  OT Start Time: 1112  OT Stop Time: 1127  OT Total Time (min): 15 min    Billable Minutes:Therapeutic Activity 15    OT/PRIETO: OT          6/27/2024

## 2024-06-27 NOTE — TREATMENT PLAN
[facility] requested auth from [facility] for rehab admission    IPR denial received stating care needs can be met at a lower level like SNF    Patient is an LVAD who requires HD currently and does not have an SNF option and does not have an OP HD chair     P2P set up with [facility] MD Hannah Carreon and Dr. Bermudez from 1-4pm keven 6/28    RECONSIDERATION for Rehab authorization REQUESTED via FAX with the following explanation :    LVAD patient with high intensity recs for PT/OT, We do not have SNFs accepting LVADs, Patient is too debilitated as an LVAD for OP HD chair assignment therefore he could not get the HD he needs in a SNF as well. He can get HD and high intensive therapy at [facility] while we pre [place] an OP HD chair for him  as he gets stronger (referrals have been started to [facility] and [facility]--no accepting facilities noted at this time). LVAD placement in the OP HD centers is exteremly difficult and his best option to get placed is for him to go to a Rehab facility to become ambulatory. [facility] is the only trained and accepting facility for LVADs in this region. If rehab continues to be denied, his IP stay will be prolonged (with required coverage by payor) due to no available safe discharge plan that is required by CMS.

## 2024-06-27 NOTE — TREATMENT PLAN
UPDATE: OP iHD referrals for LVAD patient     [facility] referral  6/27--Filled out HASTE form (see note from 6/27 @ 1046am for form) for [facility] and emailed to [email or web address] along with Hep labs      NO ACCEPTING FACILITY AT THIS TIME   This RN Will continue to work with [facility] Liaison      6/26-Received call from Shilpa with intake at [facility]  Requested [facility] referral due to previous LVAD at that clinic (2017)  Referral #[id number], requested MWF shift 3 at [facility]---Additional info needed---facesheet, H&P, CXR faxed --will Ordered Chair labs, will fax once resulted  Shilpa stated if P2P is needed or additional info they will reach out  Also explained jayro will likely be discharged to IPR with MWF HD at rehab and re-admitted to [facility] for OP HD      [facility] referral   6/27--[facility]  for [facility] called for more info--stated Marco Antonio Khan MD DENIED ACCEPTANCE due to facility not being close to hospital system. She will review other facilities and get back with me  This RN will continue working with      6/26--Received call from Camelia [phone], ext [phone]  Referral routed to 1 of 3 high acuity [facility] centers in  --Marco Antonio Khan with Medical director [facility] # [phone], Clinic Directors Cynthia Shea and Pj Palencia listed     Also explained jayro will likely be discharged to IPR with MWF HD at rehab and re-admitted to [facility] for OP HD placement if one is not assigned prior to Rehab  Will consult PM&R today and send referral to rehab      Faxes Failed from yesterday to [facility] and [facility] intake fax lines---possible too much info tagged for transmission     Re-faxing with DP 3 days and facesheet to both [facility] and [facility]     Reached out to Nephrology NP, Norm Stearns, for assistance in VAD placement in OP setting      This team previously had a patient in 2017 at [facility] on [facility] in   Reached out to the clinic --Medical Director is Dr. Dalton Mohan, Ph#  to clinic [phone]  Will see if our staff Nephrologist would mind reaching out to this medical director--in the meantime, this RN is working on referral info for intake to both companies

## 2024-06-28 ENCOUNTER — DOCUMENT SCAN (OUTPATIENT)
Dept: HOME HEALTH SERVICES | Facility: HOSPITAL | Age: 64
End: 2024-06-28
Payer: MEDICAID

## 2024-06-28 LAB
ALBUMIN SERPL BCP-MCNC: 1.9 G/DL (ref 3.5–5.2)
ALP SERPL-CCNC: 195 U/L (ref 55–135)
ALT SERPL W/O P-5'-P-CCNC: 47 U/L (ref 10–44)
APTT PPP: 43 SEC (ref 21–32)
AST SERPL-CCNC: 61 U/L (ref 10–40)
BILIRUB DIRECT SERPL-MCNC: 0.2 MG/DL (ref 0.1–0.3)
BILIRUB SERPL-MCNC: 0.4 MG/DL (ref 0.1–1)
BNP SERPL-MCNC: 1736 PG/ML (ref 0–99)
CRP SERPL-MCNC: 51 MG/L (ref 0–8.2)
INR PPP: 2.4 (ref 0.8–1.2)
LDH SERPL L TO P-CCNC: 291 U/L (ref 110–260)
POCT GLUCOSE: 142 MG/DL (ref 70–110)
POCT GLUCOSE: 165 MG/DL (ref 70–110)
POCT GLUCOSE: 174 MG/DL (ref 70–110)
POCT GLUCOSE: 80 MG/DL (ref 70–110)
PREALB SERPL-MCNC: 18 MG/DL (ref 20–43)
PROT SERPL-MCNC: 6.8 G/DL (ref 6–8.4)
PROTHROMBIN TIME: 25 SEC (ref 9–12.5)

## 2024-06-28 PROCEDURE — 97530 THERAPEUTIC ACTIVITIES: CPT

## 2024-06-28 PROCEDURE — 86140 C-REACTIVE PROTEIN: CPT | Performed by: STUDENT IN AN ORGANIZED HEALTH CARE EDUCATION/TRAINING PROGRAM

## 2024-06-28 PROCEDURE — 84134 ASSAY OF PREALBUMIN: CPT | Performed by: STUDENT IN AN ORGANIZED HEALTH CARE EDUCATION/TRAINING PROGRAM

## 2024-06-28 PROCEDURE — 25000003 PHARM REV CODE 250: Performed by: STUDENT IN AN ORGANIZED HEALTH CARE EDUCATION/TRAINING PROGRAM

## 2024-06-28 PROCEDURE — 20600001 HC STEP DOWN PRIVATE ROOM

## 2024-06-28 PROCEDURE — 85730 THROMBOPLASTIN TIME PARTIAL: CPT | Performed by: STUDENT IN AN ORGANIZED HEALTH CARE EDUCATION/TRAINING PROGRAM

## 2024-06-28 PROCEDURE — 85610 PROTHROMBIN TIME: CPT | Performed by: STUDENT IN AN ORGANIZED HEALTH CARE EDUCATION/TRAINING PROGRAM

## 2024-06-28 PROCEDURE — 25000003 PHARM REV CODE 250: Performed by: NURSE PRACTITIONER

## 2024-06-28 PROCEDURE — 83880 ASSAY OF NATRIURETIC PEPTIDE: CPT | Performed by: STUDENT IN AN ORGANIZED HEALTH CARE EDUCATION/TRAINING PROGRAM

## 2024-06-28 PROCEDURE — 25000003 PHARM REV CODE 250: Performed by: REGISTERED NURSE

## 2024-06-28 PROCEDURE — 94761 N-INVAS EAR/PLS OXIMETRY MLT: CPT

## 2024-06-28 PROCEDURE — 99232 SBSQ HOSP IP/OBS MODERATE 35: CPT | Mod: ,,, | Performed by: PHYSICIAN ASSISTANT

## 2024-06-28 PROCEDURE — 63600175 PHARM REV CODE 636 W HCPCS: Mod: JZ | Performed by: NURSE PRACTITIONER

## 2024-06-28 PROCEDURE — 80076 HEPATIC FUNCTION PANEL: CPT | Performed by: STUDENT IN AN ORGANIZED HEALTH CARE EDUCATION/TRAINING PROGRAM

## 2024-06-28 PROCEDURE — 99233 SBSQ HOSP IP/OBS HIGH 50: CPT | Mod: FS,,, | Performed by: HOSPITALIST

## 2024-06-28 PROCEDURE — 93750 INTERROGATION VAD IN PERSON: CPT | Mod: ,,, | Performed by: INTERNAL MEDICINE

## 2024-06-28 PROCEDURE — 83615 LACTATE (LD) (LDH) ENZYME: CPT | Performed by: STUDENT IN AN ORGANIZED HEALTH CARE EDUCATION/TRAINING PROGRAM

## 2024-06-28 PROCEDURE — 97535 SELF CARE MNGMENT TRAINING: CPT

## 2024-06-28 PROCEDURE — 27000248 HC VAD-ADDITIONAL DAY

## 2024-06-28 PROCEDURE — 25000003 PHARM REV CODE 250: Performed by: PHYSICIAN ASSISTANT

## 2024-06-28 PROCEDURE — 36415 COLL VENOUS BLD VENIPUNCTURE: CPT | Performed by: STUDENT IN AN ORGANIZED HEALTH CARE EDUCATION/TRAINING PROGRAM

## 2024-06-28 PROCEDURE — 97112 NEUROMUSCULAR REEDUCATION: CPT

## 2024-06-28 PROCEDURE — 25000003 PHARM REV CODE 250: Performed by: INTERNAL MEDICINE

## 2024-06-28 PROCEDURE — 99233 SBSQ HOSP IP/OBS HIGH 50: CPT | Mod: FS,,, | Performed by: INTERNAL MEDICINE

## 2024-06-28 RX ADMIN — VENLAFAXINE 37.5 MG: 37.5 TABLET ORAL at 10:06

## 2024-06-28 RX ADMIN — INSULIN ASPART 2 UNITS: 100 INJECTION, SOLUTION INTRAVENOUS; SUBCUTANEOUS at 12:06

## 2024-06-28 RX ADMIN — SODIUM BICARBONATE 650 MG TABLET 650 MG: at 08:06

## 2024-06-28 RX ADMIN — SEVELAMER CARBONATE 0.8 G: 800 POWDER, FOR SUSPENSION ORAL at 10:06

## 2024-06-28 RX ADMIN — GABAPENTIN 100 MG: 100 CAPSULE ORAL at 08:06

## 2024-06-28 RX ADMIN — CYANOCOBALAMIN TAB 250 MCG 250 MCG: 250 TAB at 08:06

## 2024-06-28 RX ADMIN — WARFARIN SODIUM 2.5 MG: 2.5 TABLET ORAL at 05:06

## 2024-06-28 RX ADMIN — SODIUM BICARBONATE 650 MG TABLET 650 MG: at 05:06

## 2024-06-28 RX ADMIN — MIDODRINE HYDROCHLORIDE 10 MG: 5 TABLET ORAL at 05:06

## 2024-06-28 RX ADMIN — FOLIC ACID 1 MG: 1 TABLET ORAL at 08:06

## 2024-06-28 RX ADMIN — MIDODRINE HYDROCHLORIDE 10 MG: 5 TABLET ORAL at 10:06

## 2024-06-28 RX ADMIN — ERYTHROPOIETIN 10000 UNITS: 10000 INJECTION, SOLUTION INTRAVENOUS; SUBCUTANEOUS at 01:06

## 2024-06-28 RX ADMIN — INSULIN ASPART 2 UNITS: 100 INJECTION, SOLUTION INTRAVENOUS; SUBCUTANEOUS at 05:06

## 2024-06-28 RX ADMIN — CHOLECALCIFEROL TAB 25 MCG (1000 UNIT) 1000 UNITS: 25 TAB at 08:06

## 2024-06-28 RX ADMIN — SEVELAMER CARBONATE 0.8 G: 800 POWDER, FOR SUSPENSION ORAL at 05:06

## 2024-06-28 RX ADMIN — QUETIAPINE FUMARATE 100 MG: 25 TABLET ORAL at 08:06

## 2024-06-28 RX ADMIN — AMOXICILLIN 500 MG: 400 POWDER, FOR SUSPENSION ORAL at 08:06

## 2024-06-28 RX ADMIN — PANTOPRAZOLE SODIUM 40 MG: 40 TABLET, DELAYED RELEASE ORAL at 08:06

## 2024-06-28 RX ADMIN — AMIODARONE HYDROCHLORIDE 400 MG: 200 TABLET ORAL at 08:06

## 2024-06-28 RX ADMIN — SEVELAMER CARBONATE 0.8 G: 800 POWDER, FOR SUSPENSION ORAL at 01:06

## 2024-06-28 RX ADMIN — MIDODRINE HYDROCHLORIDE 10 MG: 5 TABLET ORAL at 01:06

## 2024-06-28 RX ADMIN — ATORVASTATIN CALCIUM 40 MG: 40 TABLET, FILM COATED ORAL at 08:06

## 2024-06-28 NOTE — ASSESSMENT & PLAN NOTE
Patient with a baseline eGFR that appears to be variable however was noted to be in the 15-20 range in February before his recent IVÁN in May, presents now with fatigue and worsening IVÁN with a creatinine of 9.8 and eGFR of 5.5. BUN was noted to be elevated at 117, other electrolytes were normal. He was noted to have some asterixis on exam (which he states is chronic for him and unchanged from his baseline), however denies any other signs or symptoms of uremia. Bedside bladder scan revealed 200 mL of urine in his bladder and his last bladder void was 10 hours prior to this consult.     SLED initiated 6/13 x4 hours.  Mental status improved.      Recommendations:     -Will continue 3x week dialysis   -HD today for clearance and volume management at bedside. Plans to continue MWF dialysis while inpatient.   -Na bicarb tabs 650 mg po TID  -Avoid nephrotoxic agents (IV contrast, NSAID's, gadolinium contrast, PPI's) if able.   -Strict I's and O's  -Renally dose all medications  -Will need OP HD upon discharge, CM/SW involved  -TDC placed by IR 6/24

## 2024-06-28 NOTE — PLAN OF CARE
Problem: Adult Inpatient Plan of Care  Goal: Plan of Care Review  Outcome: Progressing  Goal: Patient-Specific Goal (Individualized)  Outcome: Progressing  Goal: Absence of Hospital-Acquired Illness or Injury  Outcome: Progressing  Goal: Optimal Comfort and Wellbeing  Outcome: Progressing  Goal: Readiness for Transition of Care  Outcome: Progressing     Problem: Acute Kidney Injury/Impairment  Goal: Fluid and Electrolyte Balance  Outcome: Progressing  Goal: Improved Oral Intake  Outcome: Progressing  Goal: Effective Renal Function  Outcome: Progressing     Problem: Skin Injury Risk Increased  Goal: Skin Health and Integrity  Outcome: Progressing     Problem: Ventricular Assist Device  Goal: Optimal Adjustment to Device  Outcome: Progressing  Goal: Absence of Bleeding  Outcome: Progressing  Goal: Absence of Embolism Signs and Symptoms  Outcome: Progressing  Goal: Optimal Blood Flow  Outcome: Progressing  Goal: Absence of Infection Signs and Symptoms  Outcome: Progressing  Goal: Effective Right-Sided Heart Function  Outcome: Progressing     Problem: Ventricular Assist Device  Goal: Optimal Adjustment to Device  Outcome: Progressing  Goal: Absence of Bleeding  Outcome: Progressing  Goal: Absence of Embolism Signs and Symptoms  Outcome: Progressing  Goal: Optimal Blood Flow  Outcome: Progressing  Goal: Absence of Infection Signs and Symptoms  Outcome: Progressing  Goal: Effective Right-Sided Heart Function  Outcome: Progressing     Problem: Restraint, Nonviolent  Goal: Absence of Harm or Injury  Outcome: Progressing

## 2024-06-28 NOTE — PROGRESS NOTES
06/28/2024  Jonathan ANAMARIA Ho Jr    Current provider:  Juventino Bermudez Jr.*    Device interrogation:      6/28/2024     8:29 AM 6/28/2024     8:20 AM 6/28/2024     4:45 AM 6/28/2024    12:06 AM 6/27/2024     8:20 PM 6/27/2024     5:48 PM 6/27/2024    12:15 PM   TXP LVAD INTERROGATIONS   Type HeartMate3  HeartMate3 HeartMate3 HeartMate3 HeartMate3 HeartMate3   Flow  3.5 9.7 3.9 3.8 4.2 3.8   Speed  5000 5000 5050 5000 5000 5000   PI  3.4 5.3 4.2 5.3 4.1 4.4   Power ([place])  3.3 3.4 3.4 3.4 3.4 3.5   LSL  4600 4600 4600 4600     Pulsatility   Intermittent pulse Intermittent pulse Intermittent pulse            Rounded on [facility] to ensure all mechanical assist device settings (IABP or VAD) were appropriate and all parameters were within limits.  I was able to ensure all back up equipment was present, the staff had no issues, and the Perfusion Department daily rounding was complete.      For implantable VADs: Interrogation of Ventricular assist device was performed with analysis of device parameters and review of device function. I have personally reviewed the interrogation findings and agree with findings as stated.     In emergency, the nursing units have been notified to contact the perfusion department either by:  Calling [phone] from 630am to 4pm Mon thru Fri, utilizing the On-Call Finder functionality of Epic and searching for Perfusion, or by contacting the hospital  from 4pm to 630am and on weekends and asking to speak with the perfusionist on call.    9:33 AM

## 2024-06-28 NOTE — ASSESSMENT & PLAN NOTE
Chronic with no acute bleeding, multifactorial 2/2 CKD4 and chronic anticoagulation.  Low-normal Vitamin B12 and elevated MMA c/w Vitamin B12 deficiency.  -s/p Epo   -Replaced IV iron   -Vitamin B12 supplementation  -reduce lab frequency given stable CBC and electrolytes

## 2024-06-28 NOTE — ASSESSMENT & PLAN NOTE
BG goal 140-180  T2DM.  LVAD. Admitted for worsening renal function with a BUN>100/Cr. 9.9. Hypoglycemia likely secondary to RIVAS (glimepiride) with worsened kidney function.  Required TF, now off.  BG stable, mostly at goal.  Will continue to monitor.    Plan:  -Novolog /25  -BG monitoring ac/hs  -Hypoglycemia protocol in place    ** Please call Endocrine for any BG related issues **    Discharge plans:   TBD  -D/C [facility]

## 2024-06-28 NOTE — PLAN OF CARE
Per Dr. Bermudez's P2P with Payor ---Patient is now approved for Rehab ---    [facility] Rehab can not take an LVAD this late on a Fri/Weekend, will ask them to plan for MOnday    No updates from [facility] or [facility] today regarding OP HD chair    Patient requires HD--plan for HD later today     Spoke with Landen in Acute HD ([phone]) and requested late overnight HD Sunday night into Monday am so patient can then go to rehab Monday am

## 2024-06-28 NOTE — SUBJECTIVE & OBJECTIVE
Interval History:   No distress this AM. No acute events overnight. CMP pending. Plans for HD today.    Review of patient's allergies indicates:  No Known Allergies  Current Facility-Administered Medications   Medication Frequency    0.9%  NaCl infusion (for blood administration) Q24H PRN    0.9%  NaCl infusion PRN    acetaminophen tablet 650 mg Q6H PRN    acetaminophen tablet 650 mg Q6H PRN    amiodarone tablet 400 mg Daily    amoxicillin 400 mg/5 mL suspension 500 mg Daily    atorvastatin tablet 40 mg QHS    cyanocobalamin tablet 250 mcg Daily    dextrose 10% bolus 125 mL 125 mL PRN    dextrose 10% bolus 125 mL 125 mL PRN    dextrose 10% bolus 250 mL 250 mL PRN    dextrose 10% bolus 250 mL 250 mL PRN    epoetin zohaib injection 10,000 Units Every Mon, Wed, Fri    folic acid tablet 1 mg Daily    gabapentin capsule 100 mg BID    glucagon (human recombinant) injection 1 mg PRN    glucose chewable tablet 16 g PRN    glucose chewable tablet 24 g PRN    insulin aspart U-100 pen 0-10 Units QID (AC + HS) PRN    LIDOcaine 5 % patch 2 patch Q24H    midodrine tablet 10 mg Q8H    pantoprazole EC tablet 40 mg Daily    QUEtiapine tablet 100 mg QHS    sevelamer carbonate pwpk 0.8 g TID WM    simethicone chewable tablet 80 mg TID PRN    sodium bicarbonate tablet 650 mg TID    trazodone split tablet 25 mg Nightly PRN    venlafaxine tablet 37.5 mg Daily    vitamin D 1000 units tablet 1,000 Units Daily    warfarin (COUMADIN) tablet 2.5 mg Daily       Objective:     Vital Signs (Most Recent):  Temp: 97.8 °F (36.6 °C) (06/28/24 0845)  Pulse: 86 (06/28/24 0440)  Resp: 18 (06/28/24 0351)  BP: (!) 60/0 (06/28/24 0830)  SpO2: 97 % (06/28/24 0845) Vital Signs (24h Range):  Temp:  [97.4 °F (36.3 °C)-98.6 °F (37 °C)] 97.8 °F (36.6 °C)  Pulse:  [74-86] 86  Resp:  [18] 18  SpO2:  [96 %-99 %] 97 %  BP: (60-84)/(0) 60/0     Weight: 71.8 kg (158 lb 4.6 oz) (06/28/24 0744)  Body mass index is 20.88 kg/m².  Body surface area is 1.92 meters  squared.    I/O last 3 completed shifts:  In: 440 [P.O.:440]  Out: 50 [Urine:50]     Physical Exam  Vitals and nursing note reviewed.   Constitutional:       General: He is sleeping.      Appearance: Normal appearance.   HENT:      Head: Normocephalic and atraumatic.   Eyes:      Conjunctiva/sclera: Conjunctivae normal.   Neck:      Comments: RIJ trialysis cath.   Cardiovascular:      Rate and Rhythm: Normal rate and regular rhythm.      Comments: Smooth VAD hum  Pulmonary:      Effort: Pulmonary effort is normal. No respiratory distress.      Breath sounds: Normal breath sounds.   Abdominal:      General: Bowel sounds are normal.      Palpations: Abdomen is soft.   Musculoskeletal:         General: No swelling. Normal range of motion.      Cervical back: Normal range of motion and neck supple.      Right lower leg: No edema.      Left lower leg: No edema.   Skin:     General: Skin is warm and dry.   Neurological:      General: No focal deficit present.          Significant Labs:  CBC:   Recent Labs   Lab 06/27/24  0428   WBC 6.88   RBC 2.93*   HGB 8.3*   HCT 26.3*   *   MCV 90   MCH 28.3   MCHC 31.6*     CMP:   Recent Labs   Lab 06/27/24  0427 06/28/24  0406   *  --    CALCIUM 8.7  --    ALBUMIN  --  1.9*   PROT  --  6.8     --    K 4.2  --    CO2 21*  --      --    BUN 57*  --    CREATININE 5.8*  --    ALKPHOS  --  195*   ALT  --  47*   AST  --  61*   BILITOT  --  0.4     All labs within the past 24 hours have been reviewed.

## 2024-06-28 NOTE — SUBJECTIVE & OBJECTIVE
Interval History:   -no acute events  -plan for HD today    Subjective:  -feeling well, no questions    Continuous Infusions:  Scheduled Meds:   amiodarone  400 mg Oral Daily    amoxicillin  500 mg Per NG tube Daily    atorvastatin  40 mg Oral QHS    cyanocobalamin  250 mcg Oral Daily    epoetin zohaib (PROCRIT) injection  10,000 Units Intravenous Every Mon, Wed, Fri    folic acid  1 mg Oral Daily    gabapentin  100 mg Oral BID    LIDOcaine  2 patch Transdermal Q24H    midodrine  10 mg Oral Q8H    pantoprazole  40 mg Oral Daily    QUEtiapine  100 mg Oral QHS    sevelamer carbonate  0.8 g Oral TID WM    sodium bicarbonate  650 mg Oral TID    venlafaxine  37.5 mg Oral Daily    vitamin D  1,000 Units Oral Daily    warfarin  2.5 mg Oral Daily     PRN Meds:  Current Facility-Administered Medications:     0.9%  NaCl infusion (for blood administration), , Intravenous, Q24H PRN    0.9% NaCl, , Intravenous, PRN    acetaminophen, 650 mg, Oral, Q6H PRN    acetaminophen, 650 mg, Per NG tube, Q6H PRN    dextrose 10%, 12.5 g, Intravenous, PRN    dextrose 10%, 12.5 g, Intravenous, PRN    dextrose 10%, 25 g, Intravenous, PRN    dextrose 10%, 25 g, Intravenous, PRN    glucagon (human recombinant), 1 mg, Intramuscular, PRN    glucose, 16 g, Oral, PRN    glucose, 24 g, Oral, PRN    insulin aspart U-100, 0-10 Units, Subcutaneous, QID (AC + HS) PRN    simethicone, 1 tablet, Oral, TID PRN    traZODone, 25 mg, Oral, Nightly PRN    Review of patient's allergies indicates:  No Known Allergies  Objective:     Vital Signs (Most Recent):  Temp: 97.4 °F (36.3 °C) (06/28/24 1121)  Pulse: 81 (06/28/24 1122)  Resp: 18 (06/28/24 1121)  BP: (!) 60/0 (06/28/24 0830)  SpO2: 98 % (06/28/24 1121) Vital Signs (24h Range):  Temp:  [97.4 °F (36.3 °C)-98.6 °F (37 °C)] 97.4 °F (36.3 °C)  Pulse:  [74-86] 81  Resp:  [18] 18  SpO2:  [97 %-99 %] 98 %  BP: (60-84)/(0) 60/0     Patient Vitals for the past 72 hrs (Last 3 readings):   Weight   06/28/24 0744 71.8 kg  (158 lb 4.6 oz)   06/27/24 0530 71.4 kg (157 lb 6.5 oz)   06/26/24 0915 72 kg (158 lb 11.7 oz)     Body mass index is 20.88 kg/m².      Intake/Output Summary (Last 24 hours) at 6/28/2024 1418  Last data filed at 6/28/2024 0942  Gross per 24 hour   Intake 356 ml   Output --   Net 356 ml       Hemodynamic Parameters:       Telemetry: sinus rhythm       Physical Exam  Constitutional:       Appearance: Normal appearance.   Cardiovascular:      Comments: JVP at clavicle; VAD hum  Pulmonary:      Effort: Pulmonary effort is normal.      Breath sounds: Normal breath sounds.   Musculoskeletal:      Right lower leg: No edema.      Left lower leg: No edema.   Skin:     General: Skin is warm and dry.   Neurological:      Mental Status: He is alert.            Significant Labs:  CBC:  Recent Labs   Lab 06/25/24  0407 06/26/24  0500 06/27/24  0428   WBC 6.26 7.52 6.88   RBC 3.02* 2.72* 2.93*   HGB 8.3* 7.5* 8.3*   HCT 25.9* 24.0* 26.3*    460* 475*   MCV 86 88 90   MCH 27.5 27.6 28.3   MCHC 32.0 31.3* 31.6*     BNP:  Recent Labs   Lab 06/24/24  0533 06/26/24  0500 06/28/24  0406   * 598* 1,736*     CMP:  Recent Labs   Lab 06/24/24  0533 06/25/24  0407 06/26/24  0500 06/27/24  0427 06/28/24  0406   GLU 89 134* 93 133*  --    CALCIUM 9.0 9.1 8.4* 8.7  --    ALBUMIN 2.0*  --  2.0*  --  1.9*   PROT 7.2  --  7.3  --  6.8    135* 139 138  --    K 4.4 4.7 4.1 4.2  --    CO2 27 22* 25 21*  --    CL 93* 95 101 102  --    BUN 89* 107* 42* 57*  --    CREATININE 6.1* 7.1* 4.0* 5.8*  --    ALKPHOS 256*  --  245*  --  195*   ALT 70*  --  55*  --  47*   AST 68*  --  64*  --  61*   BILITOT 0.5  --  0.5  --  0.4      Coagulation:   Recent Labs   Lab 06/26/24  0500 06/27/24  0428 06/28/24  0406   INR 2.4* 2.4* 2.4*   APTT 43.0* 44.0* 43.0*     LDH:  Recent Labs   Lab 06/26/24  0500 06/27/24  0427 06/28/24  0406   * 274* 291*     Microbiology:  Microbiology Results (last 7 days)       ** No results found for the last 168  hours. **            I have reviewed all pertinent labs within the past 24 hours.    Estimated Creatinine Clearance: 13.4 mL/min (A) (based on SCr of 5.8 mg/dL (H)).    Diagnostic Results:  I have reviewed and interpreted all pertinent imaging results/findings within the past 24 hours.

## 2024-06-28 NOTE — PT/OT/SLP PROGRESS
"Physical Therapy Co-Treatment    Patient Name:  Radha Abbott   MRN:  [id number]  Admitting Diagnosis:  Acute renal failure superimposed on stage 4 chronic kidney disease   Recent Surgery: * No surgery found *    Admit Date: 6/11/2024  Length of Stay: 17 days    Recommendations:     Discharge Recommendations:   High Intensity Therapy    Discharge Equipment Recommendations: none   Barriers to discharge: Decreased caregiver support and increased need for skilled assistance    Appropriate transfer level with nursing staff: Safe to transfer to bedside chair/bedside commode: stand pivot with 2 people with RW.    Plan:     During this hospitalization, patient to be seen 4 x/week to address the identified rehab impairments via gait training, therapeutic activities, therapeutic exercises, neuromuscular re-education and progress towards the established goals.  Plan of Care Expires:  07/13/24  Plan of Care Reviewed with: patient    Assessment:     Radha Abbott is a 64 y.o. male admitted with a medical diagnosis of Acute renal failure superimposed on stage 4 chronic kidney disease. Pt found supine agreeable to therapy session. Pt with increased R lateral lean in standing and during gait trial this session resulting in increased need for assistance. Pt completed standing trial and short gait trial before rest break where pt demo'd fixed gaze and reports feeling "weird." RN in room with doppler 60. Pt left sitting up in chair with RN present. At this time, patient is not functioning at his baseline PLOF and is a fall risk. Patient would benefit from skilled therapy services to maximize safety and independence, increase activity tolerance, decrease fall risk, decrease caregiver burden, improve QOL, improve patient's functional mobility, and decrease risk of contractures and pressure sores. Patient has demonstrated sufficient progression to warrant high intensity therapy evidenced by objectives noted below.     Problem List: weakness, " "impaired endurance, impaired self care skills, impaired functional mobility, gait instability, impaired balance, decreased upper extremity function, decreased lower extremity function, decreased safety awareness, impaired cardiopulmonary response to activity.  Rehab Prognosis: Good; patient would benefit from acute skilled PT services to address these deficits and reach maximum level of function.      Goals:   Multidisciplinary Problems       Physical Therapy Goals          Problem: Physical Therapy    Goal Priority Disciplines Outcome Goal Variances Interventions   Physical Therapy Goal     PT, PT/OT Progressing     Description: Goals to be completed by: 7/14/24    1.Pt will perform sup<>sit transfers w/ contact guard assistance  2.Pt will have sufficient dynamic balance to sit EOB while performing ADLs/therex w/ supervision  3.Pt will be able to stand up from EOB w/ contact guard assistance using LRAD  4.Pt will ambulate 100 feet w/ minimum assistance using LRAD  5.Pt will be independent w/ HEP therex on BLE w/ good form and ROM                        Subjective     RN notified prior to session. No one present upon PT entrance into room. Patient agreeable to PT treatment session.    Chief Complaint: "Let's walk"  Patient/Family Comments/goals: increase mobility and strength  Pain/Comfort:  Pain Rating 1: 0/10  Pain Rating Post-Intervention 1: 0/10      Objective:     Additional staff present: OT; OT for cotx due to pt's multiple medical comorbidities and functional deficits req'ing two skilled therapists to appropriately maximize functional potential by progressing pt's musculoskeletal strength and endurance, facilitating neuromuscular postural balance and control ,and accommodating for patient's impaired cardiopulmonary tolerance to activities.     Patient found supine with: telemetry, LVAD   Cognition:   Alert and Cooperative  Patient is oriented to Person, Place, Time, Situation  General Precautions: Standard, " "Cardiac LVAD, fall   Orthopedic Precautions:N/A   Braces: N/A   Body mass index is 20.88 kg/m².  Oxygen Device: Room Air  Vitals: BP (!) 60/0 (BP Location: Right arm, Patient Position: Sitting) Comment (Patient Position): states he cannot stand for orthostatics or get back in bed until "wooziness passes" HTS notified  Pulse 86   Temp 97.8 °F (36.6 °C) (Oral)   Resp 18   Ht 6' 1" (1.854 m)   Wt 71.8 kg (158 lb 4.6 oz)   SpO2 97%   BMI 20.88 kg/m²     Outcome Measures:  AM-PAC 6 CLICK MOBILITY  Turning over in bed (including adjusting bedclothes, sheets and blankets)?: 3  Sitting down on and standing up from a chair with arms (e.g., wheelchair, bedside commode, etc.): 2  Moving from lying on back to sitting on the side of the bed?: 3  Moving to and from a bed to a chair (including a wheelchair)?: 2  Need to walk in hospital room?: 2  Climbing 3-5 steps with a railing?: 1  Basic Mobility Total Score: 13     Functional Mobility:    Bed Mobility:   Scooting with stand by assistance  Supine > Sit with minimum assistance    Transfers:   Sit <> Stand Transfer:   moderate assistance and of 2 persons with no assistive device from EOB  Minimum assistance and of 2 persons with RW from EOB    Balance:  Sitting Balance  Static: stand by assistance  Dynamic: stand by assistance  Standing:  Static: moderate assistance and of 2 persons with no AD for ~1-2 minutes   Pt with R lateral lean during stand trial requiring cues for orientation to midline and upright stance.   Dynamic: minimum assistance and of 2 persons with RW      Gait:  Patient ambulated: 2' step up onto scale for standing weight with mod A x2 persons with no AD; 6' minimal assist and of 2 persons with rolling walker   Gait Deviation(s): unsteady gait, decreased step length, narrow base of support, flexed posture, decreased gina, and R lateral lean  all lines remained intact throughout ambulation trial  LVAD: Pt on wall power throughout trial. Flow stable " "during trial.   Comments: Patient with R lateral lean and slouched posture. Pt required increased tactile cueing for glut and thoracic extension with verbal cueing to push arms through RW. Tactiles cues for orientation to midline also provided. Trial ended and pt with reports of feeling "weird" but denied dizziness. RN in room- doppler 60.    Education:  Time provided for education, counseling and discussion of health disposition in regards to patient's current status  All questions answered within PT scope of practice and to patient's satisfaction  PT role in POC to address current functional deficits  Call nursing/pct to transfer to chair/use bathroom. Pt stated understanding.    Patient left up in chair with all lines intact, call button in reach, and RN present.    Time Tracking:     PT Received On: 06/28/24  PT Start Time: 0803     PT Stop Time: 0828  PT Total Time (min): 25 min     Billable Minutes:   Therapeutic Activity 12 minutes and Neuromuscular Re-education 13 minutes       PT/PTA: PT       6/28/2024    "

## 2024-06-28 NOTE — SUBJECTIVE & OBJECTIVE
"Interval HPI:   Overnight events: No acute events overnight. Patient in room 319/319 A. Blood glucose stable. BG at goal on current insulin regimen (SSI ). Steroid use- None .   Renal function- Abnormal -   Vasopressors-  None      Diet Soft & Bite Sized (IDDSI Level 6) Fluid - 1500mL; Thin      Eatin%  Nausea: No  Hypoglycemia and intervention: No  Fever: No  TPN and/or TF: No    BP (!) 74/0 (BP Location: Left arm, Patient Position: Lying)   Pulse 86   Temp 98.1 °F (36.7 °C) (Oral)   Resp 18   Ht 6' 1" (1.854 m)   Wt 71.4 kg (157 lb 6.5 oz)   SpO2 98%   BMI 20.77 kg/m²     Labs Reviewed and Include    Recent Labs   Lab 24  0406   ALBUMIN 1.9*   PROT 6.8   ALKPHOS 195*   ALT 47*   AST 61*   BILITOT 0.4     Lab Results   Component Value Date    WBC 6.88 2024    HGB 8.3 (L) 2024    HCT 26.3 (L) 2024    MCV 90 2024     (H) 2024     No results for input(s): "TSH", "FREET4" in the last 168 hours.  Lab Results   Component Value Date    HGBA1C 7.4 (H) 2024       Nutritional status:   Body mass index is 20.77 kg/m².  Lab Results   Component Value Date    ALBUMIN 1.9 (L) 2024    ALBUMIN 2.0 (L) 2024    ALBUMIN 2.0 (L) 2024     Lab Results   Component Value Date    PREALBUMIN 18 (L) 2024    PREALBUMIN 17 (L) 2024    PREALBUMIN 16 (L) 2024       Estimated Creatinine Clearance: 13.3 mL/min (A) (based on SCr of 5.8 mg/dL (H)).    Accu-Checks  Recent Labs     24  1256 24  1713 24  1910 24  0842 24  1252 24  1731 24  1234 24  1709 24   POCTGLUCOSE 196* 158* 229* 155* 162* 154* 142* 239* 127* 170*       Current Medications and/or Treatments Impacting Glycemic Control  Immunotherapy:    Immunosuppressants       None          Steroids:   Hormones (From admission, onward)      None          Pressors:    Autonomic Drugs (From admission, onward)      Start     Stop " Route Frequency Ordered    06/19/24 1415  midodrine tablet 10 mg         -- Oral Every 8 hours 06/19/24 1414          Hyperglycemia/Diabetes Medications:   Antihyperglycemics (From admission, onward)      Start     Stop Route Frequency Ordered    06/23/24 0729  insulin aspart U-100 pen 0-10 Units         -- SubQ Before meals & nightly PRN 06/23/24 0632

## 2024-06-28 NOTE — PT/OT/SLP PROGRESS
"Occupational Therapy   Co-Treatment    Name: Radha Abbott  MRN: [id number]  Admitting Diagnosis:  Acute renal failure superimposed on stage 4 chronic kidney disease       Recommendations:     Discharge Recommendations: High Intensity Therapy  Discharge Equipment Recommendations:  wheelchair  Barriers to discharge:  None    Assessment:     Radha Abbott is a 64 y.o. male with a medical diagnosis of Acute renal failure superimposed on stage 4 chronic kidney disease.  He presents with performance deficits affecting function including: weakness, impaired endurance, impaired self care skills, gait instability, impaired balance, decreased safety awareness, decreased lower extremity function, impaired functional mobility, impaired cardiopulmonary response to activity, decreased upper extremity function. The patient stood for ~5min, took a few steps then required a seated rest secondary to fatigue and experienced an episode of fixed gaze and decreased verbalization. Patient reported feeling weird and woozy. RN notified and present to take doppler, reporting as 60. Treatment session limited by low pressure.The patient continues to be motivated to participate in therapy and prognosis is good to continue rehabilitation services to increase independence in functional ADLs and mobility. Co-treatment with PT for maximal patient participation, safety, and activity tolerance.       Rehab Prognosis:  Good; patient would benefit from acute skilled OT services to address these deficits and reach maximum level of function.       Plan:     Patient to be seen 4 x/week to address the above listed problems via self-care/home management, therapeutic activities, therapeutic exercises, neuromuscular re-education, wheelchair management/training  Plan of Care Expires: 07/14/24  Plan of Care Reviewed with: patient    Subjective     Chief Complaint: Woozy feeling  Patient/Family Comments/goals: "I want to walk"   Pain/Comfort:  Pain Rating 1: " 0/10    Objective:     Communicated with: RN prior to session.  Patient found supine with LVAD, telemetry upon OT entry to room.    General Precautions: Standard, fall, LVAD    Orthopedic Precautions:N/A  Braces: N/A  Respiratory Status: Room air     Occupational Performance:     Bed Mobility:    Patient completed Rolling/Turning to Left with  stand by assistance  Patient completed Scooting/Bridging with stand by assistance  Patient completed Supine to Sit with minimum assistance  EOB static sitting for ~5min.    Functional Mobility/Transfers:  Patient completed Sit <> Stand Transfer with moderate assistance and of 2 persons  with  hand-held assist   Patient completed Sit <> Stand Transfer with minimum assistance and of 2 persons  with  rolling walker and hand-held assist   Patient completed a static stand for standing weight with moderate assistance of 2 persons ~30 seconds  Functional Mobility: Patient took <10 steps toward end of bed with moderate assistance of 2 persons for safety with chair follow. Requiring a seat rest break and episode of fixed gaze and feeling weird following. RN notified and present to take doppler measurement, 60.   Patient remained on wall power throughout session.     Activities of Daily Living:  Grooming: moderate assistance and of 2 persons to complete face care while standing with rolling walker      [facility] 6 Click ADL: 15    Treatment & Education:  -Patient educated on the importance of communicating any physiological changes while standing, transfers, or participating in functional mobility.   -Education on the importance of position changing and functional mobility activity to promote recovery and endurance.  -Education to transfer and participate in mobility with hospital staff present    -Education on energy conservation and task modification to increase participation in functional activities   -Provided education on the role of OT.     Patient left up in wheelchair with call  button in reach, RN notified, and RN present.    GOALS:   Multidisciplinary Problems       Occupational Therapy Goals          Problem: Occupational Therapy    Goal Priority Disciplines Outcome Interventions   Occupational Therapy Goal     OT, PT/OT Progressing    Description: Goals to be met by: 7/14/24     Patient will increase functional independence with ADLs by performing:    UE Dressing with Set-up Assistance.  LE Dressing with Minimal Assistance.  Grooming while standing at sink with Stand-by Assistance.  Toileting from toilet/bedside commode with Minimal Assistance for hygiene and clothing management.   Supine to sit with Stand-by Assistance.  Step transfer with Contact Guard Assistance  Toilet transfer to toilet/bedside commode with Contact Guard Assistance.                         Time Tracking:     OT Date of Treatment: 06/28/24  OT Start Time: 0804  OT Stop Time: 0828  OT Total Time (min): 24 min    Billable Minutes:Self Care/Home Management 12 min  Therapeutic Activity 12 min    OT/PRIETO: OT     Number of PRIETO visits since last OT visit: 0    6/28/2024

## 2024-06-28 NOTE — PLAN OF CARE
Problem: Adult Inpatient Plan of Care  Goal: Plan of Care Review  Outcome: Progressing  Flowsheets (Taken 6/28/2024 1222)  Plan of Care Reviewed With: patient  Goal: Patient-Specific Goal (Individualized)  Outcome: Progressing  Goal: Absence of Hospital-Acquired Illness or Injury  Outcome: Progressing  Intervention: Identify and Manage Fall Risk  Flowsheets (Taken 6/28/2024 1222)  Safety Promotion/Fall Prevention: assistive device/personal item within reach  Intervention: Prevent Skin Injury  Flowsheets (Taken 6/28/2024 1222)  Body Position: position changed independently  Intervention: Prevent and Manage VTE (Venous Thromboembolism) Risk  Flowsheets (Taken 6/28/2024 1222)  VTE Prevention/Management: bleeding risk assessed  Intervention: Prevent Infection  Flowsheets (Taken 6/28/2024 1222)  Infection Prevention: cohorting utilized  Goal: Optimal Comfort and Wellbeing  Outcome: Progressing  Intervention: Provide Person-Centered Care  Flowsheets (Taken 6/28/2024 1222)  Trust Relationship/Rapport:   care explained   questions encouraged  Goal: Readiness for Transition of Care  Outcome: Progressing     Problem: Infection  Goal: Absence of Infection Signs and Symptoms  Outcome: Progressing     Problem: Diabetes Comorbidity  Goal: Blood Glucose Level Within Targeted Range  Outcome: Progressing     Problem: Acute Kidney Injury/Impairment  Goal: Fluid and Electrolyte Balance  Outcome: Progressing  Goal: Improved Oral Intake  Outcome: Progressing  Goal: Effective Renal Function  Outcome: Progressing     Problem: Pneumonia  Goal: Fluid Balance  Outcome: Progressing  Goal: Resolution of Infection Signs and Symptoms  Outcome: Progressing  Goal: Effective Oxygenation and Ventilation  Outcome: Progressing     Problem: Skin Injury Risk Increased  Goal: Skin Health and Integrity  Outcome: Progressing     Problem: CRRT (Continuous Renal Replacement Therapy)  Goal: Safe, Effective Therapy Delivery  Outcome:  Progressing  Intervention: Optimize Device Care and Function  Flowsheets (Taken 6/28/2024 1222)  Bleeding Precautions: blood pressure closely monitored  Goal: Hemodynamic Stability  Outcome: Progressing  Intervention: Optimize Blood Flow  Flowsheets (Taken 6/28/2024 1222)  Bleeding Precautions: blood pressure closely monitored  Goal: Body Temperature Maintained in Desired Range  Outcome: Progressing  Goal: Absence of Infection Signs and Symptoms  Outcome: Progressing     Problem: Ventricular Assist Device  Goal: Optimal Adjustment to Device  Outcome: Progressing  Intervention: Optimize Psychosocial Response to VAD  Flowsheets (Taken 6/28/2024 1222)  Supportive Measures: active listening utilized  Family/Support System Care: self-care encouraged  Goal: Absence of Bleeding  Outcome: Progressing  Intervention: Monitor and Manage Bleeding  Flowsheets (Taken 6/28/2024 1222)  Bleeding Precautions: blood pressure closely monitored  Goal: Absence of Embolism Signs and Symptoms  Outcome: Progressing  Goal: Optimal Blood Flow  Outcome: Progressing  Goal: Absence of Infection Signs and Symptoms  Outcome: Progressing  Goal: Effective Right-Sided Heart Function  Outcome: Progressing     Problem: Ventricular Assist Device  Goal: Optimal Adjustment to Device  Outcome: Progressing  Goal: Absence of Bleeding  Outcome: Progressing  Goal: Absence of Embolism Signs and Symptoms  Outcome: Progressing  Goal: Optimal Blood Flow  Outcome: Progressing  Goal: Absence of Infection Signs and Symptoms  Outcome: Progressing  Goal: Effective Right-Sided Heart Function  Outcome: Progressing     Problem: Fall Injury Risk  Goal: Absence of Fall and Fall-Related Injury  Outcome: Progressing     Problem: Hemodialysis  Goal: Safe, Effective Therapy Delivery  Outcome: Progressing  Goal: Effective Tissue Perfusion  Outcome: Progressing  Goal: Absence of Infection Signs and Symptoms  Outcome: Progressing     Problem: Restraint, Nonviolent  Goal: Absence  of Harm or Injury  Outcome: Progressing     Problem: Wound  Goal: Optimal Coping  Outcome: Progressing  Goal: Optimal Functional Ability  Outcome: Progressing  Goal: Absence of Infection Signs and Symptoms  Outcome: Progressing  Goal: Improved Oral Intake  Outcome: Progressing  Goal: Optimal Pain Control and Function  Outcome: Progressing  Goal: Skin Health and Integrity  Outcome: Progressing  Goal: Optimal Wound Healing  Outcome: Progressing      94

## 2024-06-28 NOTE — ASSESSMENT & PLAN NOTE
-HeartMate 3 Implanted  12/1/2023  as DT  -Cont Coumadin, dosing by PharmD.  Goal INR 2.0-3.0. therapeutic today.   -Antiplatelets Not on ASA  -LDH is stable overall today. Will continue to monitor daily.  -Speed set at  5000     -Interrogation notable for no events  -Not listed for OHTx, declined for OHTX due to comorbidities     Procedure: Device Interrogation Including analysis of device parameters  Current Settings: Ventricular Assist Device  Review of device function is stable        6/28/2024     8:29 AM 6/28/2024     8:20 AM 6/28/2024     4:45 AM 6/28/2024    12:06 AM 6/27/2024     8:20 PM 6/27/2024     5:48 PM 6/27/2024    12:15 PM   TXP LVAD INTERROGATIONS   Type HeartMate3  HeartMate3 HeartMate3 HeartMate3 HeartMate3 HeartMate3   Flow  3.5 9.7 3.9 3.8 4.2 3.8   Speed  5000 5000 5050 5000 5000 5000   PI  3.4 5.3 4.2 5.3 4.1 4.4   Power ([place])  3.3 3.4 3.4 3.4 3.4 3.5   LSL  4600 4600 4600 4600     Pulsatility   Intermittent pulse Intermittent pulse Intermittent pulse

## 2024-06-28 NOTE — PROGRESS NOTES
[facility] - [facility]iology Stepdown  Endocrinology  Progress Note    Admit Date: 6/11/2024     Reason for Consult: Management of T2DM, Hyperglycemia     Surgical Procedure and Date: HM3 placement as DT (12/1/23)     Diabetes diagnosis year: several years ago    Home Diabetes Medications:  Amaryl 1 mg (recently prescribed by PCP about a week ago)     Lab Results   Component Value Date    HGBA1C 7.4 (H) 04/17/2024       How often checking glucose at home? Once daily in the AM ( prior to starting Amaryl BG in the 300s, since starting Amaryl BG 70-130s)   BG readings on regimen: 70-130s  Hypoglycemia on the regimen?  No  Missed doses on regimen?  No    Diabetes Complications include:     Hypoglycemia , Diabetic nephropathy  , Diabetic chronic kidney disease     , and Diabetic peripheral neuropathy     Complicating diabetes co morbidities:   CHF and CKD      HPI:   Patient is a 64 y.o. male with a diagnosis of stage D HFrEF, ICMP who underwent HM3 placement as DT (12/1/23) w/ a hospital stay complicated by vasoplegia(requiring Giaprezza), debility, malnutrition/impaired swallowing, and renal failure requiring HD (since weaned off) w/ recent discharge for ADHF presented to the ED with wife due to c/o worsening generalized weakness since 6/10. Per wife patient was mostly sleeping during the day despite sleeping 10 hrs at night. Patient also admits decreased appetite and decreased urine output despite taking his medications as prescribed. He has chronic shortness of breath on exertion. Denies confusion, syncope, diarrhea, constipation, N/V, dysuria, or other complaints. Patient had his prior tunnel cath removed used for HD about 2 weeks ago. He completed a 6 week therapy for Enterococcus bacteremia with ampicillin and rocephin ending 5/30/24 and was switched to PO antibiotics. Off note, patient was recently started by his PCP on glimepiride 1mg and unclear of BG trends during the day. Per patient last fasting  on 6/11.  "Patient reports lasting taking the glimepiride on  AM.   Admitted for worsening renal function with a BUN>100/Cr. 9.9. Endocrinology consulted for management of T2DM.          Interval HPI:   Overnight events: No acute events overnight. Patient in room 319/319 A. Blood glucose stable. BG at goal on current insulin regimen (SSI ). Steroid use- None .   Renal function- Abnormal -   Vasopressors-  None      Diet Soft & Bite Sized (IDDSI Level 6) Fluid - 1500mL; Thin      Eatin%  Nausea: No  Hypoglycemia and intervention: No  Fever: No  TPN and/or TF: No    BP (!) 74/0 (BP Location: Left arm, Patient Position: Lying)   Pulse 86   Temp 98.1 °F (36.7 °C) (Oral)   Resp 18   Ht 6' 1" (1.854 m)   Wt 71.4 kg (157 lb 6.5 oz)   SpO2 98%   BMI 20.77 kg/m²     Labs Reviewed and Include    Recent Labs   Lab 24  0406   ALBUMIN 1.9*   PROT 6.8   ALKPHOS 195*   ALT 47*   AST 61*   BILITOT 0.4     Lab Results   Component Value Date    WBC 6.88 2024    HGB 8.3 (L) 2024    HCT 26.3 (L) 2024    MCV 90 2024     (H) 2024     No results for input(s): "TSH", "FREET4" in the last 168 hours.  Lab Results   Component Value Date    HGBA1C 7.4 (H) 2024       Nutritional status:   Body mass index is 20.77 kg/m².  Lab Results   Component Value Date    ALBUMIN 1.9 (L) 2024    ALBUMIN 2.0 (L) 2024    ALBUMIN 2.0 (L) 2024     Lab Results   Component Value Date    PREALBUMIN 18 (L) 2024    PREALBUMIN 17 (L) 2024    PREALBUMIN 16 (L) 2024       Estimated Creatinine Clearance: 13.3 mL/min (A) (based on SCr of 5.8 mg/dL (H)).    Accu-Checks  Recent Labs     24  1256 24  1713 24  1910 24  0842 24  1252 24  1731 24  2009 24  1234 24  1709 24   POCTGLUCOSE 196* 158* 229* 155* 162* 154* 142* 239* 127* 170*       Current Medications and/or Treatments Impacting Glycemic Control  Immunotherapy:  "   Immunosuppressants       None          Steroids:   Hormones (From admission, onward)      None          Pressors:    Autonomic Drugs (From admission, onward)      Start     Stop Route Frequency Ordered    06/19/24 1415  midodrine tablet 10 mg         -- Oral Every 8 hours 06/19/24 1414          Hyperglycemia/Diabetes Medications:   Antihyperglycemics (From admission, onward)      Start     Stop Route Frequency Ordered    06/23/24 0729  insulin aspart U-100 pen 0-10 Units         -- SubQ Before meals & nightly PRN 06/23/24 0629            ASSESSMENT and PLAN    Cardiac/Vascular  LVAD (left ventricular assist device) present  Managed per primary team  Avoid hypoglycemia        Renal/  * Acute renal failure superimposed on stage 4 chronic kidney disease  Titrate insulin slowly to avoid hypoglycemia as the risk of hypoglycemia increases with decreased creatinine clearance.        Endocrine  Type 2 diabetes mellitus without complication, without long-term current use of insulin  BG goal 140-180  T2DM.  LVAD. Admitted for worsening renal function with a BUN>100/Cr. 9.9. Hypoglycemia likely secondary to RIVAS (glimepiride) with worsened kidney function.  Required TF, now off.  BG stable, mostly at goal.  Will continue to monitor.    Plan:  -Novolog /25  -BG monitoring ac/hs  -Hypoglycemia protocol in place    ** Please call Endocrine for any BG related issues **    Discharge plans:   BRANDO  -D/C Camila Wilson PA-C  Endocrinology  [facility] - Cardiology Stepdown

## 2024-06-28 NOTE — PROGRESS NOTES
[facility] - Cardiology Stepdown  Nephrology  Progress Note    Patient Name: Radha Abbott  MRN: [id number]  Admission Date: 6/11/2024  Hospital Length of Stay: 17 days  Attending Provider: Juventino Bermudez Jr.*   Primary Care Physician: Vasu Kong MD  Principal Problem:Acute renal failure superimposed on stage 4 chronic kidney disease    Subjective:     Interval History:   No distress this AM. No acute events overnight. CMP pending. Plans for HD today.    Review of patient's allergies indicates:  No Known Allergies  Current Facility-Administered Medications   Medication Frequency    0.9%  NaCl infusion (for blood administration) Q24H PRN    0.9%  NaCl infusion PRN    acetaminophen tablet 650 mg Q6H PRN    acetaminophen tablet 650 mg Q6H PRN    amiodarone tablet 400 mg Daily    amoxicillin 400 mg/5 mL suspension 500 mg Daily    atorvastatin tablet 40 mg QHS    cyanocobalamin tablet 250 mcg Daily    dextrose 10% bolus 125 mL 125 mL PRN    dextrose 10% bolus 125 mL 125 mL PRN    dextrose 10% bolus 250 mL 250 mL PRN    dextrose 10% bolus 250 mL 250 mL PRN    epoetin zohaib injection 10,000 Units Every Mon, Wed, Fri    folic acid tablet 1 mg Daily    gabapentin capsule 100 mg BID    glucagon (human recombinant) injection 1 mg PRN    glucose chewable tablet 16 g PRN    glucose chewable tablet 24 g PRN    insulin aspart U-100 pen 0-10 Units QID (AC + HS) PRN    LIDOcaine 5 % patch 2 patch Q24H    midodrine tablet 10 mg Q8H    pantoprazole EC tablet 40 mg Daily    QUEtiapine tablet 100 mg QHS    sevelamer carbonate pwpk 0.8 g TID WM    simethicone chewable tablet 80 mg TID PRN    sodium bicarbonate tablet 650 mg TID    trazodone split tablet 25 mg Nightly PRN    venlafaxine tablet 37.5 mg Daily    vitamin D 1000 units tablet 1,000 Units Daily    warfarin (COUMADIN) tablet 2.5 mg Daily       Objective:     Vital Signs (Most Recent):  Temp: 97.8 °F (36.6 °C) (06/28/24 0845)  Pulse: 86 (06/28/24 0440)  Resp: 18  (06/28/24 0351)  BP: (!) 60/0 (06/28/24 0830)  SpO2: 97 % (06/28/24 0845) Vital Signs (24h Range):  Temp:  [97.4 °F (36.3 °C)-98.6 °F (37 °C)] 97.8 °F (36.6 °C)  Pulse:  [74-86] 86  Resp:  [18] 18  SpO2:  [96 %-99 %] 97 %  BP: (60-84)/(0) 60/0     Weight: 71.8 kg (158 lb 4.6 oz) (06/28/24 0744)  Body mass index is 20.88 kg/m².  Body surface area is 1.92 meters squared.    I/O last 3 completed shifts:  In: 440 [P.O.:440]  Out: 50 [Urine:50]     Physical Exam  Vitals and nursing note reviewed.   Constitutional:       General: He is sleeping.      Appearance: Normal appearance.   HENT:      Head: Normocephalic and atraumatic.   Eyes:      Conjunctiva/sclera: Conjunctivae normal.   Neck:      Comments: RIJ trialysis cath.   Cardiovascular:      Rate and Rhythm: Normal rate and regular rhythm.      Comments: Smooth VAD hum  Pulmonary:      Effort: Pulmonary effort is normal. No respiratory distress.      Breath sounds: Normal breath sounds.   Abdominal:      General: Bowel sounds are normal.      Palpations: Abdomen is soft.   Musculoskeletal:         General: No swelling. Normal range of motion.      Cervical back: Normal range of motion and neck supple.      Right lower leg: No edema.      Left lower leg: No edema.   Skin:     General: Skin is warm and dry.   Neurological:      General: No focal deficit present.          Significant Labs:  CBC:   Recent Labs   Lab 06/27/24 0428   WBC 6.88   RBC 2.93*   HGB 8.3*   HCT 26.3*   *   MCV 90   MCH 28.3   MCHC 31.6*     CMP:   Recent Labs   Lab 06/27/24 0427 06/28/24  0406   *  --    CALCIUM 8.7  --    ALBUMIN  --  1.9*   PROT  --  6.8     --    K 4.2  --    CO2 21*  --      --    BUN 57*  --    CREATININE 5.8*  --    ALKPHOS  --  195*   ALT  --  47*   AST  --  61*   BILITOT  --  0.4     All labs within the past 24 hours have been reviewed.     Assessment/Plan:     Cardiac/Vascular  LVAD (left ventricular assist device) present  -HTS  following.    Renal/  * Acute renal failure superimposed on stage 4 chronic kidney disease  Patient with a baseline eGFR that appears to be variable however was noted to be in the 15-20 range in February before his recent IVÁN in May, presents now with fatigue and worsening IVÁN with a creatinine of 9.8 and eGFR of 5.5. BUN was noted to be elevated at 117, other electrolytes were normal. He was noted to have some asterixis on exam (which he states is chronic for him and unchanged from his baseline), however denies any other signs or symptoms of uremia. Bedside bladder scan revealed 200 mL of urine in his bladder and his last bladder void was 10 hours prior to this consult.     SLED initiated 6/13 x4 hours.  Mental status improved.      Recommendations:     -Will continue 3x week dialysis   -HD today for clearance and volume management at bedside. Plans to continue MWF dialysis while inpatient.   -Na bicarb tabs 650 mg po TID  -Avoid nephrotoxic agents (IV contrast, NSAID's, gadolinium contrast, PPI's) if able.   -Strict I's and O's  -Renally dose all medications  -Will need OP HD upon discharge, CM/SW involved  -TDC placed by IR 6/24    Oncology  Anemia  -EPO with HD  -Feraheme 6/19; 2nd dose 6/25        Thank you for your consult. I will follow-up with patient. Please contact us if you have any additional questions.    Matilda Hinton, RONI, FNP-C  Nephrology  [facility] - Cardiology Stepdown

## 2024-06-29 LAB
APTT PPP: 43.6 SEC (ref 21–32)
INR PPP: 2.5 (ref 0.8–1.2)
LDH SERPL L TO P-CCNC: 278 U/L (ref 110–260)
POCT GLUCOSE: 121 MG/DL (ref 70–110)
POCT GLUCOSE: 153 MG/DL (ref 70–110)
POCT GLUCOSE: 167 MG/DL (ref 70–110)
POCT GLUCOSE: 183 MG/DL (ref 70–110)
PROTHROMBIN TIME: 25.6 SEC (ref 9–12.5)

## 2024-06-29 PROCEDURE — 25000003 PHARM REV CODE 250: Performed by: NURSE PRACTITIONER

## 2024-06-29 PROCEDURE — 83615 LACTATE (LD) (LDH) ENZYME: CPT | Performed by: STUDENT IN AN ORGANIZED HEALTH CARE EDUCATION/TRAINING PROGRAM

## 2024-06-29 PROCEDURE — 25000003 PHARM REV CODE 250: Performed by: INTERNAL MEDICINE

## 2024-06-29 PROCEDURE — 85610 PROTHROMBIN TIME: CPT | Performed by: STUDENT IN AN ORGANIZED HEALTH CARE EDUCATION/TRAINING PROGRAM

## 2024-06-29 PROCEDURE — 85730 THROMBOPLASTIN TIME PARTIAL: CPT | Performed by: STUDENT IN AN ORGANIZED HEALTH CARE EDUCATION/TRAINING PROGRAM

## 2024-06-29 PROCEDURE — 25000003 PHARM REV CODE 250: Performed by: PHYSICIAN ASSISTANT

## 2024-06-29 PROCEDURE — 80100014 HC HEMODIALYSIS 1:1

## 2024-06-29 PROCEDURE — 99232 SBSQ HOSP IP/OBS MODERATE 35: CPT | Mod: ,,, | Performed by: PHYSICIAN ASSISTANT

## 2024-06-29 PROCEDURE — 25000003 PHARM REV CODE 250: Performed by: REGISTERED NURSE

## 2024-06-29 PROCEDURE — 25000003 PHARM REV CODE 250: Performed by: STUDENT IN AN ORGANIZED HEALTH CARE EDUCATION/TRAINING PROGRAM

## 2024-06-29 PROCEDURE — 94761 N-INVAS EAR/PLS OXIMETRY MLT: CPT

## 2024-06-29 PROCEDURE — 27000248 HC VAD-ADDITIONAL DAY

## 2024-06-29 PROCEDURE — 20600001 HC STEP DOWN PRIVATE ROOM

## 2024-06-29 PROCEDURE — 99232 SBSQ HOSP IP/OBS MODERATE 35: CPT | Mod: ,,, | Performed by: NURSE PRACTITIONER

## 2024-06-29 PROCEDURE — 36415 COLL VENOUS BLD VENIPUNCTURE: CPT | Performed by: STUDENT IN AN ORGANIZED HEALTH CARE EDUCATION/TRAINING PROGRAM

## 2024-06-29 RX ORDER — SODIUM CHLORIDE 9 MG/ML
INJECTION, SOLUTION INTRAVENOUS ONCE
Status: CANCELLED | OUTPATIENT
Start: 2024-07-01

## 2024-06-29 RX ADMIN — MIDODRINE HYDROCHLORIDE 10 MG: 5 TABLET ORAL at 01:06

## 2024-06-29 RX ADMIN — WARFARIN SODIUM 2.5 MG: 2.5 TABLET ORAL at 05:06

## 2024-06-29 RX ADMIN — SEVELAMER CARBONATE 0.8 G: 800 POWDER, FOR SUSPENSION ORAL at 08:06

## 2024-06-29 RX ADMIN — PANTOPRAZOLE SODIUM 40 MG: 40 TABLET, DELAYED RELEASE ORAL at 08:06

## 2024-06-29 RX ADMIN — ATORVASTATIN CALCIUM 40 MG: 40 TABLET, FILM COATED ORAL at 10:06

## 2024-06-29 RX ADMIN — AMIODARONE HYDROCHLORIDE 400 MG: 200 TABLET ORAL at 08:06

## 2024-06-29 RX ADMIN — SODIUM BICARBONATE 650 MG TABLET 650 MG: at 10:06

## 2024-06-29 RX ADMIN — GABAPENTIN 100 MG: 100 CAPSULE ORAL at 10:06

## 2024-06-29 RX ADMIN — MIDODRINE HYDROCHLORIDE 10 MG: 5 TABLET ORAL at 10:06

## 2024-06-29 RX ADMIN — SODIUM BICARBONATE 650 MG TABLET 650 MG: at 03:06

## 2024-06-29 RX ADMIN — AMOXICILLIN 500 MG: 400 POWDER, FOR SUSPENSION ORAL at 10:06

## 2024-06-29 RX ADMIN — GABAPENTIN 100 MG: 100 CAPSULE ORAL at 08:06

## 2024-06-29 RX ADMIN — SODIUM BICARBONATE 650 MG TABLET 650 MG: at 08:06

## 2024-06-29 RX ADMIN — CHOLECALCIFEROL TAB 25 MCG (1000 UNIT) 1000 UNITS: 25 TAB at 08:06

## 2024-06-29 RX ADMIN — SEVELAMER CARBONATE 0.8 G: 800 POWDER, FOR SUSPENSION ORAL at 01:06

## 2024-06-29 RX ADMIN — MIDODRINE HYDROCHLORIDE 10 MG: 5 TABLET ORAL at 05:06

## 2024-06-29 RX ADMIN — SEVELAMER CARBONATE 0.8 G: 800 POWDER, FOR SUSPENSION ORAL at 05:06

## 2024-06-29 RX ADMIN — CYANOCOBALAMIN TAB 250 MCG 250 MCG: 250 TAB at 08:06

## 2024-06-29 RX ADMIN — FOLIC ACID 1 MG: 1 TABLET ORAL at 08:06

## 2024-06-29 RX ADMIN — VENLAFAXINE 37.5 MG: 37.5 TABLET ORAL at 08:06

## 2024-06-29 RX ADMIN — QUETIAPINE FUMARATE 100 MG: 25 TABLET ORAL at 10:06

## 2024-06-29 NOTE — PROGRESS NOTES
Bedside hemodialysis treatment 1:1 started per MD orders via R chest wall permacath accessed using aseptic technique. VS stable to start tx. POC discussed to pt and verbalized understanding.Primary nurse aware of initiation of HD.

## 2024-06-29 NOTE — PLAN OF CARE
Problem: Adult Inpatient Plan of Care  Goal: Plan of Care Review  Outcome: Progressing     Problem: Adult Inpatient Plan of Care  Goal: Absence of Hospital-Acquired Illness or Injury  Outcome: Progressing     Problem: Infection  Goal: Absence of Infection Signs and Symptoms  Outcome: Progressing     Problem: Diabetes Comorbidity  Goal: Blood Glucose Level Within Targeted Range  Outcome: Progressing     Problem: Acute Kidney Injury/Impairment  Goal: Fluid and Electrolyte Balance  Outcome: Progressing     Problem: CRRT (Continuous Renal Replacement Therapy)  Goal: Safe, Effective Therapy Delivery  Outcome: Progressing     Problem: Skin Injury Risk Increased  Goal: Skin Health and Integrity  Outcome: Progressing     Problem: Ventricular Assist Device  Goal: Optimal Adjustment to Device  Outcome: Progressing     Problem: Fall Injury Risk  Goal: Absence of Fall and Fall-Related Injury  Outcome: Progressing     Problem: Hemodialysis  Goal: Safe, Effective Therapy Delivery  Outcome: Progressing     Problem: Wound  Goal: Optimal Coping  Outcome: Progressing     Patient is AAOX4, VSS, NAD. Plan of care reviewed and explained. Patient and spouse verbalized understanding. Fall precautions maintained. Bed in low and locked position, side rails up x 3, call light within reach.

## 2024-06-29 NOTE — PROGRESS NOTES
[facility] - [facility] Step[facility]  Nephrology  Progress Note    Patient Name: Radha Abbott  MRN: [id number]  Admission Date: 6/11/2024  Hospital Length of Stay: 18 days  Attending Provider: Juventino Bermudez Jr.*   Primary Care Physician: Vasu Kong MD  Principal Problem:Acute renal failure superimposed on stage 4 chronic kidney disease    Subjective:     Interval History: HD completed this AM with 0.5 L removed. No distress on exam. No complaints. Plans for IPR transfer Monday AM.     Review of patient's allergies indicates:  No Known Allergies  Current Facility-Administered Medications   Medication Frequency    0.9%  NaCl infusion (for blood administration) Q24H PRN    0.9%  NaCl infusion PRN    acetaminophen tablet 650 mg Q6H PRN    acetaminophen tablet 650 mg Q6H PRN    amiodarone tablet 400 mg Daily    amoxicillin 400 mg/5 mL suspension 500 mg Daily    atorvastatin tablet 40 mg QHS    cyanocobalamin tablet 250 mcg Daily    dextrose 10% bolus 125 mL 125 mL PRN    dextrose 10% bolus 125 mL 125 mL PRN    dextrose 10% bolus 250 mL 250 mL PRN    dextrose 10% bolus 250 mL 250 mL PRN    epoetin zohaib injection 10,000 Units Every Mon, Wed, Fri    folic acid tablet 1 mg Daily    gabapentin capsule 100 mg BID    glucagon (human recombinant) injection 1 mg PRN    glucose chewable tablet 16 g PRN    glucose chewable tablet 24 g PRN    insulin aspart U-100 pen 0-10 Units QID (AC + HS) PRN    LIDOcaine 5 % patch 2 patch Q24H    midodrine tablet 10 mg Q8H    pantoprazole EC tablet 40 mg Daily    QUEtiapine tablet 100 mg QHS    sevelamer carbonate pwpk 0.8 g TID WM    simethicone chewable tablet 80 mg TID PRN    sodium bicarbonate tablet 650 mg TID    trazodone split tablet 25 mg Nightly PRN    venlafaxine tablet 37.5 mg Daily    vitamin D 1000 units tablet 1,000 Units Daily    warfarin (COUMADIN) tablet 2.5 mg Daily       Objective:     Vital Signs (Most Recent):  Temp: 98.8 °F (37.1 °C) (06/29/24 0815)  Pulse: 90  (06/29/24 1116)  Resp: 18 (06/29/24 0815)  BP: (!) 84/0 (06/29/24 0815)  SpO2: 100 % (06/29/24 0815) Vital Signs (24h Range):  Temp:  [97.4 °F (36.3 °C)-98.8 °F (37.1 °C)] 98.8 °F (37.1 °C)  Pulse:  [76-95] 90  Resp:  [16-20] 18  SpO2:  [95 %-100 %] 100 %  BP: ()/(0-83) 84/0     Weight: 71.8 kg (158 lb 4.6 oz) (06/28/24 0744)  Body mass index is 20.88 kg/m².  Body surface area is 1.92 meters squared.    I/O last 3 completed shifts:  In: 1336 [P.O.:836; Other:500]  Out: 1000 [Other:1000]     Physical Exam  Vitals and nursing note reviewed.   Constitutional:       General: He is sleeping.      Appearance: Normal appearance.   HENT:      Head: Normocephalic and atraumatic.   Eyes:      Conjunctiva/sclera: Conjunctivae normal.   Neck:      Comments: RIJ trialysis cath.   Cardiovascular:      Rate and Rhythm: Normal rate and regular rhythm.      Comments: Smooth VAD hum  Pulmonary:      Effort: Pulmonary effort is normal. No respiratory distress.      Breath sounds: Normal breath sounds.   Abdominal:      General: Bowel sounds are normal.      Palpations: Abdomen is soft.   Musculoskeletal:         General: No swelling. Normal range of motion.      Cervical back: Normal range of motion and neck supple.      Right lower leg: No edema.      Left lower leg: No edema.   Skin:     General: Skin is warm and dry.   Neurological:      General: No focal deficit present.          Significant Labs:  CBC:   Recent Labs   Lab 06/27/24 0428   WBC 6.88   RBC 2.93*   HGB 8.3*   HCT 26.3*   *   MCV 90   MCH 28.3   MCHC 31.6*     CMP:   Recent Labs   Lab 06/27/24  0427 06/28/24  0406   *  --    CALCIUM 8.7  --    ALBUMIN  --  1.9*   PROT  --  6.8     --    K 4.2  --    CO2 21*  --      --    BUN 57*  --    CREATININE 5.8*  --    ALKPHOS  --  195*   ALT  --  47*   AST  --  61*   BILITOT  --  0.4     All labs within the past 24 hours have been reviewed.   Assessment/Plan:     Cardiac/Vascular  LVAD (left  ventricular assist device) present  -HTS following.    Renal/  * Acute renal failure superimposed on stage 4 chronic kidney disease  Patient with a baseline eGFR that appears to be variable however was noted to be in the 15-20 range in February before his recent IVÁN in May, presents now with fatigue and worsening IVÁN with a creatinine of 9.8 and eGFR of 5.5. BUN was noted to be elevated at 117, other electrolytes were normal. He was noted to have some asterixis on exam (which he states is chronic for him and unchanged from his baseline), however denies any other signs or symptoms of uremia. Bedside bladder scan revealed 200 mL of urine in his bladder and his last bladder void was 10 hours prior to this consult.     SLED initiated 6/13 x4 hours.  Mental status improved.      Recommendations:     -Will continue 3x week dialysis   -HD completed this AM.   -Will plan for HD Sunday-Monday AM prior to transfer to [facility]. Discussed with team and HD staff in the [facility]. Orders placed.   -Na bicarb tabs 650 mg po TID  -Avoid nephrotoxic agents (IV contrast, NSAID's, gadolinium contrast, PPI's) if able.   -Strict I's and O's  -Renally dose all medications  -TDC placed by IR 6/24    Chronic kidney disease-mineral and bone disorder    -low calcium bath  -phos binders with meals  -low phos diet    Oncology  Anemia  -EPO with HD  -Feraheme 6/19; 2nd dose 6/25        Thank you for your consult. I will follow-up with patient. Please contact us if you have any additional questions.    Matilda Hinton, RONI, FNP-C  Nephrology  [facility] - [facility]

## 2024-06-29 NOTE — SUBJECTIVE & OBJECTIVE
Interval History: No acute events overnight. Tolerated HD well with net fluid removal 500cc. Per his spouse, he is making some urine but not collected as bedding was soaked. He has no complaints. Working with PT/OT    Continuous Infusions:  Scheduled Meds:   amiodarone  400 mg Oral Daily    amoxicillin  500 mg Per NG tube Daily    atorvastatin  40 mg Oral QHS    cyanocobalamin  250 mcg Oral Daily    epoetin zohaib (PROCRIT) injection  10,000 Units Intravenous Every Mon, Wed, Fri    folic acid  1 mg Oral Daily    gabapentin  100 mg Oral BID    LIDOcaine  2 patch Transdermal Q24H    midodrine  10 mg Oral Q8H    pantoprazole  40 mg Oral Daily    QUEtiapine  100 mg Oral QHS    sevelamer carbonate  0.8 g Oral TID WM    sodium bicarbonate  650 mg Oral TID    venlafaxine  37.5 mg Oral Daily    vitamin D  1,000 Units Oral Daily    warfarin  2.5 mg Oral Daily     PRN Meds:  Current Facility-Administered Medications:     0.9%  NaCl infusion (for blood administration), , Intravenous, Q24H PRN    0.9% NaCl, , Intravenous, PRN    acetaminophen, 650 mg, Oral, Q6H PRN    acetaminophen, 650 mg, Per NG tube, Q6H PRN    dextrose 10%, 12.5 g, Intravenous, PRN    dextrose 10%, 12.5 g, Intravenous, PRN    dextrose 10%, 25 g, Intravenous, PRN    dextrose 10%, 25 g, Intravenous, PRN    glucagon (human recombinant), 1 mg, Intramuscular, PRN    glucose, 16 g, Oral, PRN    glucose, 24 g, Oral, PRN    insulin aspart U-100, 0-10 Units, Subcutaneous, QID (AC + HS) PRN    simethicone, 1 tablet, Oral, TID PRN    traZODone, 25 mg, Oral, Nightly PRN    Review of patient's allergies indicates:  No Known Allergies  Objective:     Vital Signs (Most Recent):  Temp: 98.8 °F (37.1 °C) (06/29/24 0815)  Pulse: 90 (06/29/24 0815)  Resp: 18 (06/29/24 0815)  BP: (!) 84/0 (06/29/24 0815)  SpO2: 100 % (06/29/24 0815) Vital Signs (24h Range):  Temp:  [97.4 °F (36.3 °C)-98.8 °F (37.1 °C)] 98.8 °F (37.1 °C)  Pulse:  [76-95] 90  Resp:  [16-20] 18  SpO2:  [95 %-100 %]  100 %  BP: ()/(0-83) 84/0     Patient Vitals for the past 72 hrs (Last 3 readings):   Weight   06/28/24 0744 71.8 kg (158 lb 4.6 oz)   06/27/24 0530 71.4 kg (157 lb 6.5 oz)     Body mass index is 20.88 kg/m².      Intake/Output Summary (Last 24 hours) at 6/29/2024 1006  Last data filed at 6/29/2024 0957  Gross per 24 hour   Intake 1220 ml   Output 1000 ml   Net 220 ml              Physical Exam  Constitutional:       Appearance: Normal appearance.   Cardiovascular:      Comments: JVP at clavicle; VAD hum  Pulmonary:      Effort: Pulmonary effort is normal.      Breath sounds: Normal breath sounds.   Musculoskeletal:      Right lower leg: No edema.      Left lower leg: No edema.   Skin:     General: Skin is warm and dry.   Neurological:      Mental Status: He is alert.            Significant Labs:  CBC:  Recent Labs   Lab 06/25/24  0407 06/26/24  0500 06/27/24  0428   WBC 6.26 7.52 6.88   RBC 3.02* 2.72* 2.93*   HGB 8.3* 7.5* 8.3*   HCT 25.9* 24.0* 26.3*    460* 475*   MCV 86 88 90   MCH 27.5 27.6 28.3   MCHC 32.0 31.3* 31.6*     BNP:  Recent Labs   Lab 06/24/24  0533 06/26/24  0500 06/28/24  0406   * 598* 1,736*     CMP:  Recent Labs   Lab 06/24/24  0533 06/25/24  0407 06/26/24  0500 06/27/24  0427 06/28/24  0406   GLU 89 134* 93 133*  --    CALCIUM 9.0 9.1 8.4* 8.7  --    ALBUMIN 2.0*  --  2.0*  --  1.9*   PROT 7.2  --  7.3  --  6.8    135* 139 138  --    K 4.4 4.7 4.1 4.2  --    CO2 27 22* 25 21*  --    CL 93* 95 101 102  --    BUN 89* 107* 42* 57*  --    CREATININE 6.1* 7.1* 4.0* 5.8*  --    ALKPHOS 256*  --  245*  --  195*   ALT 70*  --  55*  --  47*   AST 68*  --  64*  --  61*   BILITOT 0.5  --  0.5  --  0.4      Coagulation:   Recent Labs   Lab 06/27/24 0428 06/28/24  0406 06/29/24 0448   INR 2.4* 2.4* 2.5*   APTT 44.0* 43.0* 43.6*     LDH:  Recent Labs   Lab 06/27/24 0427 06/28/24  0406 06/29/24  0448   * 291* 278*     Microbiology:  Microbiology Results (last 7 days)        "** No results found for the last 168 hours. **            BMP:   Recent Labs   Lab 06/27/24  0427   *      K 4.2      CO2 21*   BUN 57*   CREATININE 5.8*   CALCIUM 8.7   MG 2.2     Cardiac Markers: No results for input(s): "CKMB", "TROPONINT", "MYOGLOBIN" in the last 72 hours.  Coagulation:   Recent Labs   Lab 06/29/24  0448   INR 2.5*   APTT 43.6*     I have reviewed all pertinent labs within the past 24 hours.    Estimated Creatinine Clearance: 13.4 mL/min (A) (based on SCr of 5.8 mg/dL (H)).    Diagnostic Results:  I have reviewed and interpreted all pertinent imaging results/findings within the past 24 hours.  "

## 2024-06-29 NOTE — NURSING
Dialysis complete as of 0605. VSS, Doppler 72/0 , cuff pressures obtained by dialysis RN see flowsheet.   0.5L of fluid removed with HD, tolerated well. 10 mg Midodrine administered during EPIC downtime see emar comment section.   AAOx4, denies pain or SOB, socks given, repositioned in bed independently. Wife at bedside.

## 2024-06-29 NOTE — PROGRESS NOTES
[facility] - [facility] Step[facility]  Endocrinology  Progress Note    Admit Date: 6/11/2024     Reason for Consult: Management of T2DM, Hyperglycemia     Surgical Procedure and Date: HM3 placement as DT (12/1/23)     Diabetes diagnosis year: several years ago    Home Diabetes Medications:  Amaryl 1 mg (recently prescribed by PCP about a week ago)     Lab Results   Component Value Date    HGBA1C 7.4 (H) 04/17/2024       How often checking glucose at home? Once daily in the AM ( prior to starting Amaryl BG in the 300s, since starting Amaryl BG 70-130s)   BG readings on regimen: 70-130s  Hypoglycemia on the regimen?  No  Missed doses on regimen?  No    Diabetes Complications include:     Hypoglycemia , Diabetic nephropathy  , Diabetic chronic kidney disease     , and Diabetic peripheral neuropathy     Complicating diabetes co morbidities:   CHF and CKD      HPI:   Patient is a 64 y.o. male with a diagnosis of stage D HFrEF, ICMP who underwent HM3 placement as DT (12/1/23) w/ a hospital stay complicated by vasoplegia(requiring Giaprezza), debility, malnutrition/impaired swallowing, and renal failure requiring HD (since weaned off) w/ recent discharge for ADHF presented to the ED with wife due to c/o worsening generalized weakness since 6/10. Per wife patient was mostly sleeping during the day despite sleeping 10 hrs at night. Patient also admits decreased appetite and decreased urine output despite taking his medications as prescribed. He has chronic shortness of breath on exertion. Denies confusion, syncope, diarrhea, constipation, N/V, dysuria, or other complaints. Patient had his prior tunnel cath removed used for HD about 2 weeks ago. He completed a 6 week therapy for Enterococcus bacteremia with ampicillin and rocephin ending 5/30/24 and was switched to PO antibiotics. Off note, patient was recently started by his PCP on glimepiride 1mg and unclear of BG trends during the day. Per patient last fasting  on 6/11.  "Patient reports lasting taking the glimepiride on  AM.   Admitted for worsening renal function with a BUN>100/Cr. 9.9. Endocrinology consulted for management of T2DM.          Interval HPI:   No acute events overnight. Patient in room 319/319 A. Blood glucose stable. BG at goal on current insulin regimen (SSI ). Steroid use- None .      Renal function- Abnormal -   Vasopressors-  None     Diet Adult Regular Fluid - 1500mL     Eatin%  Nausea: No  Hypoglycemia and intervention: No  Fever: No  TPN and/or TF: No    BP (!) 80/58   Pulse 76   Temp 98.5 °F (36.9 °C)   Resp 18   Ht 6' 1" (1.854 m)   Wt 71.8 kg (158 lb 4.6 oz)   SpO2 100%   BMI 20.88 kg/m²     Labs Reviewed and Include    No results for input(s): "GLU", "CALCIUM", "ALBUMIN", "PROT", "NA", "K", "CO2", "CL", "BUN", "CREATININE", "ALKPHOS", "ALT", "AST", "BILITOT" in the last 24 hours.  Lab Results   Component Value Date    WBC 6.88 2024    HGB 8.3 (L) 2024    HCT 26.3 (L) 2024    MCV 90 2024     (H) 2024     No results for input(s): "TSH", "FREET4" in the last 168 hours.  Lab Results   Component Value Date    HGBA1C 7.4 (H) 2024       Nutritional status:   Body mass index is 20.88 kg/m².  Lab Results   Component Value Date    ALBUMIN 1.9 (L) 2024    ALBUMIN 2.0 (L) 2024    ALBUMIN 2.0 (L) 2024     Lab Results   Component Value Date    PREALBUMIN 18 (L) 2024    PREALBUMIN 17 (L) 2024    PREALBUMIN 16 (L) 2024       Estimated Creatinine Clearance: 13.4 mL/min (A) (based on SCr of 5.8 mg/dL (H)).    Accu-Checks  Recent Labs     24  0842 24  1252 24  1731 24  2009 24  1234 24  1709 24  2021 24  0826 24  1218 24  1713   POCTGLUCOSE 155* 162* 154* 142* 239* 127* 170* 80 174* 165*       Current Medications and/or Treatments Impacting Glycemic Control  Immunotherapy:    Immunosuppressants       None      "     Steroids:   Hormones (From admission, onward)      None          Pressors:    Autonomic Drugs (From admission, onward)      Start     Stop Route Frequency Ordered    06/19/24 1415  midodrine tablet 10 mg         -- Oral Every 8 hours 06/19/24 1414          Hyperglycemia/Diabetes Medications:   Antihyperglycemics (From admission, onward)      Start     Stop Route Frequency Ordered    06/23/24 0729  insulin aspart U-100 pen 0-10 Units         -- SubQ Before meals & nightly PRN 06/23/24 0629            ASSESSMENT and PLAN    Cardiac/Vascular  LVAD (left ventricular assist device) present  Managed per primary team  Avoid hypoglycemia        Renal/  * Acute renal failure superimposed on stage 4 chronic kidney disease  Titrate insulin slowly to avoid hypoglycemia as the risk of hypoglycemia increases with decreased creatinine clearance.        Endocrine  Type 2 diabetes mellitus without complication, without long-term current use of insulin  BG goal 140-180  T2DM.  LVAD. Admitted for worsening renal function with a BUN>100/Cr. 9.9. Hypoglycemia likely secondary to RIVAS (glimepiride) with worsened kidney function.  Required TF, now off.  BG stable, mostly at goal.  Will continue to monitor.    Plan:  -Novolog [facility] 150/25  -BG monitoring ac/hs  -Hypoglycemia protocol in place    ** Please call Endocrine for any BG related issues **    Discharge plans:   BRANDO  -D/C Camila Wilson PA-C  Endocrinology  [facility] - Cardiology Stepdown

## 2024-06-29 NOTE — PROGRESS NOTES
[facility] - Cardiology Stepdown  Heart Transplant  Progress Note    Patient Name: Radha Abbott  MRN: [id number]  Admission Date: 6/11/2024  Hospital Length of Stay: 18 days  Attending Physician: Juventino Bermudez Jr.*  Primary Care Provider: Vasu Kong MD  Principal Problem:Acute renal failure superimposed on stage 4 chronic kidney disease    Subjective:   Interval History: No acute events overnight. Tolerated HD well with net fluid removal 500cc. Per his spouse, he is making some urine but not collected as bedding was soaked. He has no complaints. Working with PT/OT    Continuous Infusions:  Scheduled Meds:   amiodarone  400 mg Oral Daily    amoxicillin  500 mg Per NG tube Daily    atorvastatin  40 mg Oral QHS    cyanocobalamin  250 mcg Oral Daily    epoetin zohaib (PROCRIT) injection  10,000 Units Intravenous Every Mon, Wed, Fri    folic acid  1 mg Oral Daily    gabapentin  100 mg Oral BID    LIDOcaine  2 patch Transdermal Q24H    midodrine  10 mg Oral Q8H    pantoprazole  40 mg Oral Daily    QUEtiapine  100 mg Oral QHS    sevelamer carbonate  0.8 g Oral TID WM    sodium bicarbonate  650 mg Oral TID    venlafaxine  37.5 mg Oral Daily    vitamin D  1,000 Units Oral Daily    warfarin  2.5 mg Oral Daily     PRN Meds:  Current Facility-Administered Medications:     0.9%  NaCl infusion (for blood administration), , Intravenous, Q24H PRN    0.9% NaCl, , Intravenous, PRN    acetaminophen, 650 mg, Oral, Q6H PRN    acetaminophen, 650 mg, Per NG tube, Q6H PRN    dextrose 10%, 12.5 g, Intravenous, PRN    dextrose 10%, 12.5 g, Intravenous, PRN    dextrose 10%, 25 g, Intravenous, PRN    dextrose 10%, 25 g, Intravenous, PRN    glucagon (human recombinant), 1 mg, Intramuscular, PRN    glucose, 16 g, Oral, PRN    glucose, 24 g, Oral, PRN    insulin aspart U-100, 0-10 Units, Subcutaneous, QID (AC + HS) PRN    simethicone, 1 tablet, Oral, TID PRN    traZODone, 25 mg, Oral, Nightly PRN    Review of patient's allergies  indicates:  No Known Allergies  Objective:     Vital Signs (Most Recent):  Temp: 98.8 °F (37.1 °C) (06/29/24 0815)  Pulse: 90 (06/29/24 0815)  Resp: 18 (06/29/24 0815)  BP: (!) 84/0 (06/29/24 0815)  SpO2: 100 % (06/29/24 0815) Vital Signs (24h Range):  Temp:  [97.4 °F (36.3 °C)-98.8 °F (37.1 °C)] 98.8 °F (37.1 °C)  Pulse:  [76-95] 90  Resp:  [16-20] 18  SpO2:  [95 %-100 %] 100 %  BP: ()/(0-83) 84/0     Patient Vitals for the past 72 hrs (Last 3 readings):   Weight   06/28/24 0744 71.8 kg (158 lb 4.6 oz)   06/27/24 0530 71.4 kg (157 lb 6.5 oz)     Body mass index is 20.88 kg/m².      Intake/Output Summary (Last 24 hours) at 6/29/2024 1006  Last data filed at 6/29/2024 0957  Gross per 24 hour   Intake 1220 ml   Output 1000 ml   Net 220 ml              Physical Exam  Constitutional:       Appearance: Normal appearance.   Cardiovascular:      Comments: JVP at clavicle; VAD hum  Pulmonary:      Effort: Pulmonary effort is normal.      Breath sounds: Normal breath sounds.   Musculoskeletal:      Right lower leg: No edema.      Left lower leg: No edema.   Skin:     General: Skin is warm and dry.   Neurological:      Mental Status: He is alert.            Significant Labs:  CBC:  Recent Labs   Lab 06/25/24  0407 06/26/24  0500 06/27/24  0428   WBC 6.26 7.52 6.88   RBC 3.02* 2.72* 2.93*   HGB 8.3* 7.5* 8.3*   HCT 25.9* 24.0* 26.3*    460* 475*   MCV 86 88 90   MCH 27.5 27.6 28.3   MCHC 32.0 31.3* 31.6*     BNP:  Recent Labs   Lab 06/24/24  0533 06/26/24  0500 06/28/24  0406   * 598* 1,736*     CMP:  Recent Labs   Lab 06/24/24  0533 06/25/24  0407 06/26/24  0500 06/27/24  0427 06/28/24  0406   GLU 89 134* 93 133*  --    CALCIUM 9.0 9.1 8.4* 8.7  --    ALBUMIN 2.0*  --  2.0*  --  1.9*   PROT 7.2  --  7.3  --  6.8    135* 139 138  --    K 4.4 4.7 4.1 4.2  --    CO2 27 22* 25 21*  --    CL 93* 95 101 102  --    BUN 89* 107* 42* 57*  --    CREATININE 6.1* 7.1* 4.0* 5.8*  --    ALKPHOS 256*  --  245*  --   "195*   ALT 70*  --  55*  --  47*   AST 68*  --  64*  --  61*   BILITOT 0.5  --  0.5  --  0.4      Coagulation:   Recent Labs   Lab 06/27/24 0428 06/28/24 0406 06/29/24 0448   INR 2.4* 2.4* 2.5*   APTT 44.0* 43.0* 43.6*     LDH:  Recent Labs   Lab 06/27/24 0427 06/28/24 0406 06/29/24 0448   * 291* 278*     Microbiology:  Microbiology Results (last 7 days)       ** No results found for the last 168 hours. **            BMP:   Recent Labs   Lab 06/27/24 0427   *      K 4.2      CO2 21*   BUN 57*   CREATININE 5.8*   CALCIUM 8.7   MG 2.2     Cardiac Markers: No results for input(s): "CKMB", "TROPONINT", "MYOGLOBIN" in the last 72 hours.  Coagulation:   Recent Labs   Lab 06/29/24 0448   INR 2.5*   APTT 43.6*     I have reviewed all pertinent labs within the past 24 hours.    Estimated Creatinine Clearance: 13.4 mL/min (A) (based on SCr of 5.8 mg/dL (H)).    Diagnostic Results:  I have reviewed and interpreted all pertinent imaging results/findings within the past 24 hours.  Assessment and Plan:     Mr. Radha Abbott is a 65 yo male with stage D HFrEF, ICMP who underwent HM3 placement as DT (12/1/23) w/ a hospital stay complicated by vasoplegia(requiring Giaprezza), debility, malnutrition/impaired swallowing, and renal failure requiring HD (since weaned off) w/ recent discharge for ADHF presented to the ED with wife due to c/o worsening generalized weakness since 6/10. Per wife patient was mostly sleeping during the day despite sleeping 10 hrs at night. Patient also admits decreased appetite and decreased urine output despite taking his medications as prescribed. He has not urinated since 6/11 at 2pm. He has chronic shortness of breath on exertion. Denies confusion, syncope, diarrhea, constipation, N/V, dysuria, or other complaints. Patient had his prior tunnel cath removed used for HD about 2 weeks ago. He completed a 6 week therapy for Enterococcus bacteremia with ampicillin and rocephin " ending 5/30/24a and was switched to PO antibiotics.     On prior hospital stay nephrology consulted during hospital stay due to elevated renal function. They feel he is close to being a dialysis patient. He diuresed on IV Bumex with prn Metolazone. He is net negative 1.2L throughout his hospital stay and weight on day of discharge was 179lbs down from 186lbs on admit. We continued his home dose of Bumex 4mg BID but increased his prn Metolazone to 10mg. He received a dose of Epo while inpatient and he has plans to follow up with Heme/Onc to get outpatient therapy arranged.      2D Echo with CFD done on 3/26/2024  LVAD: There is a Heartmate III LVAD running at 5000 RPM. The aortic valve does not open. The ventricular septum is at midline. Inflow cannula well seated at the apex. Outflow graft not visualized.   Left Ventricle: The left ventricle is moderately dilated. Normal wall thickness. Global hypokinesis present. Septal motion is abnormal. There is severely reduced systolic function with a visually estimated ejection fraction of 5 - 10%. There is diastolic dysfunction but grade cannot be determined.   Right Ventricle: Mild right ventricular enlargement. Wall thickness is normal. Right ventricle wall motion is normal. Systolic function is normal. Pacemaker lead present in the ventricle.   Left Atrium: Left atrium is severely dilated.   Right Atrium: Right atrium is moderately dilated.   Mitral Valve: The mitral valve is repaired with an Noble stitch. There is mild regurgitation.   IVC/SVC: Normal venous pressure at 3 mmHg.              * Acute renal failure superimposed on stage 4 chronic kidney disease  Mr. 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 Admitted for worsening renal function with a BUN>100/Cr. 9.9. Currently oliguric.    - CT Ab/pel: Kidneys/ Ureters:  Normal in size and location.  Mild bilateral perinephric stranding, a nonspecific finding and similar to prior.  No hydronephrosis or nephrolithiasis. No ureteral dilatation.  - tunneled line placed 6/24 for HD   - HD per nephro   - Will need OP HD upon discharge, recommend CM/SW involvement to start workup.  - Renally dosing medications      Bacteremia due to Enterococcus  He completed a 6 week therapy for Enterococcus bacteremia with ampicillin and rocephin ending 5/30/24a and was switched to Amoxicillin for suppression    Anticoagulant long-term use  Cont warfarin    Anemia  Chronic with no acute bleeding, multifactorial 2/2 CKD4 and chronic anticoagulation.  Low-normal Vitamin B12 and elevated MMA c/w Vitamin B12 deficiency.  -s/p Epo   -Replaced IV iron   -Vitamin B12 supplementation  -reduce lab frequency given stable CBC and electrolytes    Adjustment disorder with depressed mood  Cont SSRI    LVAD (left ventricular assist device) present  -HeartMate 3 Implanted  12/1/2023  as DT  -Cont Coumadin, dosing by PharmD.  Goal INR 2.0-3.0. therapeutic today.   -Antiplatelets Not on ASA  -LDH is stable overall today. Will continue to monitor daily.  -Speed set at  5000     -Interrogation notable for no events  -Not listed for OHTx, declined for OHTX due to comorbidities     Procedure: Device Interrogation Including analysis of device parameters  Current Settings: Ventricular Assist Device  Review of device function is stable        6/28/2024     8:29 AM 6/28/2024     8:20 AM 6/28/2024     4:45 AM 6/28/2024    12:06 AM 6/27/2024     8:20 PM 6/27/2024     5:48 PM 6/27/2024    12:15 PM   TXP LVAD INTERROGATIONS   Type HeartMate3  HeartMate3 HeartMate3 HeartMate3 HeartMate3 HeartMate3   Flow  3.5 9.7 3.9 3.8 4.2 3.8   Speed  5000 5000 5050 5000 5000 5000   PI  3.4 5.3 4.2 5.3 4.1 4.4   Power ([place])  3.3 3.4 3.4 3.4 3.4 3.5   LSL  4600 4600 4600 4600     Pulsatility   Intermittent pulse Intermittent pulse Intermittent  pulse           PAF (paroxysmal atrial fibrillation)  -Continue amiodarone and Coumadin      Type 2 diabetes mellitus without complication, without long-term current use of insulin  -Endocrine    Acute on chronic combined systolic and diastolic heart failure  Owing to his advanced kidney disease and significantly elevated creat, he is not on a  ACEI/ARB/ARNI or MRA. He is also not on a beta blocker due to RV dysfunction.     Plan:   -Volume removal with HD   - See LVAD  - See IVÁN    CAD (coronary artery disease)  -Continue statin        Samra López MD  Heart Transplant  [facility] - Cardiology Stepdown

## 2024-06-29 NOTE — ASSESSMENT & PLAN NOTE
Mr. 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 Admitted for worsening renal function with a BUN>100/Cr. 9.9. Currently oliguric.    - CT Ab/pel: Kidneys/ Ureters: Normal in size and location.  Mild bilateral perinephric stranding, a nonspecific finding and similar to prior.  No hydronephrosis or nephrolithiasis. No ureteral dilatation.  - tunneled line placed 6/24 for HD   - HD per nephro   - Will need OP HD upon discharge, recommend CM/SW involvement to start workup.  - Renally dosing medications

## 2024-06-29 NOTE — PLAN OF CARE
Problem: Adult Inpatient Plan of Care  Goal: Plan of Care Review  Outcome: Progressing  Goal: Patient-Specific Goal (Individualized)  Outcome: Progressing  Goal: Absence of Hospital-Acquired Illness or Injury  Outcome: Progressing  Goal: Optimal Comfort and Wellbeing  Outcome: Progressing  Goal: Readiness for Transition of Care  Outcome: Progressing     Problem: Infection  Goal: Absence of Infection Signs and Symptoms  Outcome: Progressing     Problem: Diabetes Comorbidity  Goal: Blood Glucose Level Within Targeted Range  Outcome: Progressing     Problem: Acute Kidney Injury/Impairment  Goal: Fluid and Electrolyte Balance  Outcome: Progressing  Goal: Improved Oral Intake  Outcome: Progressing  Goal: Effective Renal Function  Outcome: Progressing     Problem: Skin Injury Risk Increased  Goal: Skin Health and Integrity  Outcome: Progressing     Problem: Ventricular Assist Device  Goal: Optimal Adjustment to Device  Outcome: Progressing  Goal: Absence of Bleeding  Outcome: Progressing  Goal: Absence of Embolism Signs and Symptoms  Outcome: Progressing  Goal: Optimal Blood Flow  Outcome: Progressing  Goal: Absence of Infection Signs and Symptoms  Outcome: Progressing  Goal: Effective Right-Sided Heart Function  Outcome: Progressing     Problem: Fall Injury Risk  Goal: Absence of Fall and Fall-Related Injury  Outcome: Progressing     Problem: Hemodialysis  Goal: Safe, Effective Therapy Delivery  Outcome: Progressing  Goal: Effective Tissue Perfusion  Outcome: Progressing  Goal: Absence of Infection Signs and Symptoms  Outcome: Progressing     Problem: Wound  Goal: Optimal Coping  Outcome: Progressing  Goal: Optimal Functional Ability  Outcome: Progressing  Goal: Absence of Infection Signs and Symptoms  Outcome: Progressing  Goal: Improved Oral Intake  Outcome: Progressing  Goal: Optimal Pain Control and Function  Outcome: Progressing  Goal: Skin Health and Integrity  Outcome: Progressing  Goal: Optimal Wound  Healing  Outcome: Progressing

## 2024-06-29 NOTE — PROGRESS NOTES
06/29/2024  [place]    Current provider:  Juventino Bermudez Jr.*    Device interrogation:      6/29/2024     1:32 AM 6/28/2024     8:54 PM 6/28/2024     8:29 AM 6/28/2024     8:20 AM 6/28/2024     4:45 AM 6/28/2024    12:06 AM 6/27/2024     8:20 PM   TXP LVAD INTERROGATIONS   Type HeartMate3 HeartMate3 HeartMate3  HeartMate3 HeartMate3 HeartMate3   Flow 3.4 3.8  3.5 9.7 3.9 3.8   Speed 5000 5000  5000 5000 5050 5000   PI 5.4 6.2  3.4 5.3 4.2 5.3   Power ([place]) 3.4 3.4  3.3 3.4 3.4 3.4   LSL 4600 4600  4600 4600 4600 4600   Pulsatility  Intermittent pulse   Intermittent pulse Intermittent pulse Intermittent pulse          Rounded on [facility] to ensure all mechanical assist device settings (IABP or VAD) were appropriate and all parameters were within limits.  I was able to ensure all back up equipment was present, the staff had no issues, and the Perfusion Department daily rounding was complete.      For implantable VADs: Interrogation of Ventricular assist device was performed with analysis of device parameters and review of device function. I have personally reviewed the interrogation findings and agree with findings as stated.     In emergency, the nursing units have been notified to contact the perfusion department either by:  Calling [phone] from 630am to 4pm Mon thru Fri, utilizing the On-Call Finder functionality of Epic and searching for Perfusion, or by contacting the hospital  from 4pm to 630am and on weekends and asking to speak with the perfusionist on call.    6:55 AM

## 2024-06-29 NOTE — SUBJECTIVE & OBJECTIVE
Interval History: HD completed this AM with 0.5 L removed. No distress on exam. No complaints. Plans for IPR transfer Monday AM.     Review of patient's allergies indicates:  No Known Allergies  Current Facility-Administered Medications   Medication Frequency    0.9%  NaCl infusion (for blood administration) Q24H PRN    0.9%  NaCl infusion PRN    acetaminophen tablet 650 mg Q6H PRN    acetaminophen tablet 650 mg Q6H PRN    amiodarone tablet 400 mg Daily    amoxicillin 400 mg/5 mL suspension 500 mg Daily    atorvastatin tablet 40 mg QHS    cyanocobalamin tablet 250 mcg Daily    dextrose 10% bolus 125 mL 125 mL PRN    dextrose 10% bolus 125 mL 125 mL PRN    dextrose 10% bolus 250 mL 250 mL PRN    dextrose 10% bolus 250 mL 250 mL PRN    epoetin zohaib injection 10,000 Units Every Mon, Wed, Fri    folic acid tablet 1 mg Daily    gabapentin capsule 100 mg BID    glucagon (human recombinant) injection 1 mg PRN    glucose chewable tablet 16 g PRN    glucose chewable tablet 24 g PRN    insulin aspart U-100 pen 0-10 Units QID (AC + HS) PRN    LIDOcaine 5 % patch 2 patch Q24H    midodrine tablet 10 mg Q8H    pantoprazole EC tablet 40 mg Daily    QUEtiapine tablet 100 mg QHS    sevelamer carbonate pwpk 0.8 g TID WM    simethicone chewable tablet 80 mg TID PRN    sodium bicarbonate tablet 650 mg TID    trazodone split tablet 25 mg Nightly PRN    venlafaxine tablet 37.5 mg Daily    vitamin D 1000 units tablet 1,000 Units Daily    warfarin (COUMADIN) tablet 2.5 mg Daily       Objective:     Vital Signs (Most Recent):  Temp: 98.8 °F (37.1 °C) (06/29/24 0815)  Pulse: 90 (06/29/24 1116)  Resp: 18 (06/29/24 0815)  BP: (!) 84/0 (06/29/24 0815)  SpO2: 100 % (06/29/24 0815) Vital Signs (24h Range):  Temp:  [97.4 °F (36.3 °C)-98.8 °F (37.1 °C)] 98.8 °F (37.1 °C)  Pulse:  [76-95] 90  Resp:  [16-20] 18  SpO2:  [95 %-100 %] 100 %  BP: ()/(0-83) 84/0     Weight: 71.8 kg (158 lb 4.6 oz) (06/28/24 0744)  Body mass index is 20.88  kg/m².  Body surface area is 1.92 meters squared.    I/O last 3 completed shifts:  In: 1336 [P.O.:836; Other:500]  Out: 1000 [Other:1000]     Physical Exam  Vitals and nursing note reviewed.   Constitutional:       General: He is sleeping.      Appearance: Normal appearance.   HENT:      Head: Normocephalic and atraumatic.   Eyes:      Conjunctiva/sclera: Conjunctivae normal.   Neck:      Comments: RIJ trialysis cath.   Cardiovascular:      Rate and Rhythm: Normal rate and regular rhythm.      Comments: Smooth VAD hum  Pulmonary:      Effort: Pulmonary effort is normal. No respiratory distress.      Breath sounds: Normal breath sounds.   Abdominal:      General: Bowel sounds are normal.      Palpations: Abdomen is soft.   Musculoskeletal:         General: No swelling. Normal range of motion.      Cervical back: Normal range of motion and neck supple.      Right lower leg: No edema.      Left lower leg: No edema.   Skin:     General: Skin is warm and dry.   Neurological:      General: No focal deficit present.          Significant Labs:  CBC:   Recent Labs   Lab 06/27/24  0428   WBC 6.88   RBC 2.93*   HGB 8.3*   HCT 26.3*   *   MCV 90   MCH 28.3   MCHC 31.6*     CMP:   Recent Labs   Lab 06/27/24  0427 06/28/24  0406   *  --    CALCIUM 8.7  --    ALBUMIN  --  1.9*   PROT  --  6.8     --    K 4.2  --    CO2 21*  --      --    BUN 57*  --    CREATININE 5.8*  --    ALKPHOS  --  195*   ALT  --  47*   AST  --  61*   BILITOT  --  0.4     All labs within the past 24 hours have been reviewed.

## 2024-06-29 NOTE — SUBJECTIVE & OBJECTIVE
"Interval HPI:   No acute events overnight. Patient in room 319/319 A. Blood glucose stable. BG at goal on current insulin regimen (SSI ). Steroid use- None .      Renal function- Abnormal -   Vasopressors-  None     Diet Adult Regular Fluid - 1500mL     Eatin%  Nausea: No  Hypoglycemia and intervention: No  Fever: No  TPN and/or TF: No    BP (!) 80/58   Pulse 76   Temp 98.5 °F (36.9 °C)   Resp 18   Ht 6' 1" (1.854 m)   Wt 71.8 kg (158 lb 4.6 oz)   SpO2 100%   BMI 20.88 kg/m²     Labs Reviewed and Include    No results for input(s): "GLU", "CALCIUM", "ALBUMIN", "PROT", "NA", "K", "CO2", "CL", "BUN", "CREATININE", "ALKPHOS", "ALT", "AST", "BILITOT" in the last 24 hours.  Lab Results   Component Value Date    WBC 6.88 2024    HGB 8.3 (L) 2024    HCT 26.3 (L) 2024    MCV 90 2024     (H) 2024     No results for input(s): "TSH", "FREET4" in the last 168 hours.  Lab Results   Component Value Date    HGBA1C 7.4 (H) 2024       Nutritional status:   Body mass index is 20.88 kg/m².  Lab Results   Component Value Date    ALBUMIN 1.9 (L) 2024    ALBUMIN 2.0 (L) 2024    ALBUMIN 2.0 (L) 2024     Lab Results   Component Value Date    PREALBUMIN 18 (L) 2024    PREALBUMIN 17 (L) 2024    PREALBUMIN 16 (L) 2024       Estimated Creatinine Clearance: 13.4 mL/min (A) (based on SCr of 5.8 mg/dL (H)).    Accu-Checks  Recent Labs     24  0842 24  1252 24  1731 24  1234 24  1709 24  0826 24  1218 24  1713   POCTGLUCOSE 155* 162* 154* 142* 239* 127* 170* 80 174* 165*       Current Medications and/or Treatments Impacting Glycemic Control  Immunotherapy:    Immunosuppressants       None          Steroids:   Hormones (From admission, onward)      None          Pressors:    Autonomic Drugs (From admission, onward)      Start     Stop Route Frequency Ordered    24 1415  " midodrine tablet 10 mg         -- Oral Every 8 hours 06/19/24 1414          Hyperglycemia/Diabetes Medications:   Antihyperglycemics (From admission, onward)      Start     Stop Route Frequency Ordered    06/23/24 0729  insulin aspart U-100 pen 0-10 Units         -- SubQ Before meals & nightly PRN 06/23/24 0618

## 2024-06-29 NOTE — ASSESSMENT & PLAN NOTE
Patient with a baseline eGFR that appears to be variable however was noted to be in the 15-20 range in February before his recent IVÁN in May, presents now with fatigue and worsening IVÁN with a creatinine of 9.8 and eGFR of 5.5. BUN was noted to be elevated at 117, other electrolytes were normal. He was noted to have some asterixis on exam (which he states is chronic for him and unchanged from his baseline), however denies any other signs or symptoms of uremia. Bedside bladder scan revealed 200 mL of urine in his bladder and his last bladder void was 10 hours prior to this consult.     SLED initiated 6/13 x4 hours.  Mental status improved.      Recommendations:     -Will continue 3x week dialysis   -HD completed this AM.   -Will plan for HD Sunday-Monday AM prior to transfer to [facility]. Discussed with team and HD staff in the [facility]. Orders placed.   -Na bicarb tabs 650 mg po TID  -Avoid nephrotoxic agents (IV contrast, NSAID's, gadolinium contrast, PPI's) if able.   -Strict I's and O's  -Renally dose all medications  -TDC placed by IR 6/24

## 2024-06-29 NOTE — PROGRESS NOTES
Hemodialysis treatment completed. Blood returned. Dialyzed patient for 3 hours with net fluid removal of 0.5 L. Patient tolerated treatment well.    R chest wall permacath  flushed and locked with saline. Lumens capped and wrapped in sterile gauze.     Report given to primary nurse

## 2024-06-30 LAB
ANION GAP SERPL CALC-SCNC: 9 MMOL/L (ref 8–16)
APTT PPP: 44.6 SEC (ref 21–32)
BUN SERPL-MCNC: 40 MG/DL (ref 8–23)
CALCIUM SERPL-MCNC: 8.5 MG/DL (ref 8.7–10.5)
CHLORIDE SERPL-SCNC: 101 MMOL/L (ref 95–110)
CO2 SERPL-SCNC: 25 MMOL/L (ref 23–29)
CREAT SERPL-MCNC: 4.9 MG/DL (ref 0.5–1.4)
EST. GFR  (NO RACE VARIABLE): 12.6 ML/MIN/1.73 M^2
GLUCOSE SERPL-MCNC: 73 MG/DL (ref 70–110)
INR PPP: 2.4 (ref 0.8–1.2)
LDH SERPL L TO P-CCNC: 273 U/L (ref 110–260)
POCT GLUCOSE: 136 MG/DL (ref 70–110)
POCT GLUCOSE: 164 MG/DL (ref 70–110)
POCT GLUCOSE: 181 MG/DL (ref 70–110)
POCT GLUCOSE: 81 MG/DL (ref 70–110)
POTASSIUM SERPL-SCNC: 4.6 MMOL/L (ref 3.5–5.1)
PROTHROMBIN TIME: 25.2 SEC (ref 9–12.5)
SODIUM SERPL-SCNC: 135 MMOL/L (ref 136–145)

## 2024-06-30 PROCEDURE — 20600001 HC STEP DOWN PRIVATE ROOM

## 2024-06-30 PROCEDURE — 25000003 PHARM REV CODE 250: Performed by: REGISTERED NURSE

## 2024-06-30 PROCEDURE — 63600175 PHARM REV CODE 636 W HCPCS: Performed by: PHYSICIAN ASSISTANT

## 2024-06-30 PROCEDURE — 85610 PROTHROMBIN TIME: CPT | Performed by: STUDENT IN AN ORGANIZED HEALTH CARE EDUCATION/TRAINING PROGRAM

## 2024-06-30 PROCEDURE — 93750 INTERROGATION VAD IN PERSON: CPT | Mod: ,,, | Performed by: INTERNAL MEDICINE

## 2024-06-30 PROCEDURE — 25000003 PHARM REV CODE 250: Performed by: STUDENT IN AN ORGANIZED HEALTH CARE EDUCATION/TRAINING PROGRAM

## 2024-06-30 PROCEDURE — 99233 SBSQ HOSP IP/OBS HIGH 50: CPT | Mod: ,,, | Performed by: PHYSICIAN ASSISTANT

## 2024-06-30 PROCEDURE — 25000003 PHARM REV CODE 250: Performed by: INTERNAL MEDICINE

## 2024-06-30 PROCEDURE — 36415 COLL VENOUS BLD VENIPUNCTURE: CPT | Performed by: STUDENT IN AN ORGANIZED HEALTH CARE EDUCATION/TRAINING PROGRAM

## 2024-06-30 PROCEDURE — 80100014 HC HEMODIALYSIS 1:1

## 2024-06-30 PROCEDURE — 85730 THROMBOPLASTIN TIME PARTIAL: CPT | Performed by: STUDENT IN AN ORGANIZED HEALTH CARE EDUCATION/TRAINING PROGRAM

## 2024-06-30 PROCEDURE — 83615 LACTATE (LD) (LDH) ENZYME: CPT | Performed by: STUDENT IN AN ORGANIZED HEALTH CARE EDUCATION/TRAINING PROGRAM

## 2024-06-30 PROCEDURE — 99900035 HC TECH TIME PER 15 MIN (STAT)

## 2024-06-30 PROCEDURE — 25000003 PHARM REV CODE 250: Performed by: PHYSICIAN ASSISTANT

## 2024-06-30 PROCEDURE — 94761 N-INVAS EAR/PLS OXIMETRY MLT: CPT

## 2024-06-30 PROCEDURE — 80048 BASIC METABOLIC PNL TOTAL CA: CPT | Performed by: STUDENT IN AN ORGANIZED HEALTH CARE EDUCATION/TRAINING PROGRAM

## 2024-06-30 PROCEDURE — 25000003 PHARM REV CODE 250: Performed by: NURSE PRACTITIONER

## 2024-06-30 PROCEDURE — 27000248 HC VAD-ADDITIONAL DAY

## 2024-06-30 RX ORDER — HYDRALAZINE HYDROCHLORIDE 20 MG/ML
10 INJECTION INTRAMUSCULAR; INTRAVENOUS ONCE
Status: COMPLETED | OUTPATIENT
Start: 2024-06-30 | End: 2024-06-30

## 2024-06-30 RX ADMIN — GABAPENTIN 100 MG: 100 CAPSULE ORAL at 10:06

## 2024-06-30 RX ADMIN — CYANOCOBALAMIN TAB 250 MCG 250 MCG: 250 TAB at 08:06

## 2024-06-30 RX ADMIN — SEVELAMER CARBONATE 0.8 G: 800 POWDER, FOR SUSPENSION ORAL at 12:06

## 2024-06-30 RX ADMIN — AMOXICILLIN 500 MG: 400 POWDER, FOR SUSPENSION ORAL at 10:06

## 2024-06-30 RX ADMIN — PANTOPRAZOLE SODIUM 40 MG: 40 TABLET, DELAYED RELEASE ORAL at 08:06

## 2024-06-30 RX ADMIN — ATORVASTATIN CALCIUM 40 MG: 40 TABLET, FILM COATED ORAL at 10:06

## 2024-06-30 RX ADMIN — AMIODARONE HYDROCHLORIDE 400 MG: 200 TABLET ORAL at 08:06

## 2024-06-30 RX ADMIN — FOLIC ACID 1 MG: 1 TABLET ORAL at 08:06

## 2024-06-30 RX ADMIN — SEVELAMER CARBONATE 0.8 G: 800 POWDER, FOR SUSPENSION ORAL at 04:06

## 2024-06-30 RX ADMIN — SODIUM BICARBONATE 650 MG TABLET 650 MG: at 08:06

## 2024-06-30 RX ADMIN — SEVELAMER CARBONATE 0.8 G: 800 POWDER, FOR SUSPENSION ORAL at 08:06

## 2024-06-30 RX ADMIN — HYDRALAZINE HYDROCHLORIDE 10 MG: 20 INJECTION, SOLUTION INTRAMUSCULAR; INTRAVENOUS at 05:06

## 2024-06-30 RX ADMIN — WARFARIN SODIUM 2.5 MG: 2.5 TABLET ORAL at 04:06

## 2024-06-30 RX ADMIN — VENLAFAXINE 37.5 MG: 37.5 TABLET ORAL at 08:06

## 2024-06-30 RX ADMIN — SODIUM BICARBONATE 650 MG TABLET 650 MG: at 10:06

## 2024-06-30 RX ADMIN — QUETIAPINE FUMARATE 100 MG: 25 TABLET ORAL at 10:06

## 2024-06-30 RX ADMIN — MIDODRINE HYDROCHLORIDE 10 MG: 5 TABLET ORAL at 05:06

## 2024-06-30 RX ADMIN — GABAPENTIN 100 MG: 100 CAPSULE ORAL at 08:06

## 2024-06-30 RX ADMIN — CHOLECALCIFEROL TAB 25 MCG (1000 UNIT) 1000 UNITS: 25 TAB at 08:06

## 2024-06-30 RX ADMIN — SODIUM BICARBONATE 650 MG TABLET 650 MG: at 02:06

## 2024-06-30 RX ADMIN — TRAZODONE HYDROCHLORIDE 25 MG: 50 TABLET ORAL at 10:06

## 2024-06-30 NOTE — ASSESSMENT & PLAN NOTE
-Mr. 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 Admitted for worsening renal function with a BUN>100/Cr. 9.9. Currently oliguric.  - CT Ab/pel: Kidneys/ Ureters: Normal in size and location.  Mild bilateral perinephric stranding, a nonspecific finding and similar to prior.  No hydronephrosis or nephrolithiasis. No ureteral dilatation.  - tunneled line placed 6/24 for HD   - HD per nephro   - Will need OP HD upon discharge, recommend CM/SW involvement to start workup.  - Renally dosing medications

## 2024-06-30 NOTE — ASSESSMENT & PLAN NOTE
-HeartMate 3 Implanted  12/1/2023  as DT  -Cont Coumadin, dosing by PharmD.  Goal INR 2.0-3.0. therapeutic today. Previous home dose was 2.5mg Qdaily  -Antiplatelets Not on ASA  -LDH is stable overall today. Will continue to monitor daily.  -Speed set at  5000     -Interrogation notable for no events  -Not listed for OHTx, declined for OHTX due to comorbidities     Procedure: Device Interrogation Including analysis of device parameters  Current Settings: Ventricular Assist Device  Review of device function is stable        6/30/2024    12:15 PM 6/30/2024     7:48 AM 6/30/2024     5:48 AM 6/30/2024     1:00 AM 6/29/2024     8:00 PM 6/29/2024     4:45 PM 6/29/2024    12:33 PM   TXP LVAD INTERROGATIONS   Type HeartMate3 HeartMate3 HeartMate3 HeartMate3 HeartMate3 HeartMate3 HeartMate3   Flow  4.1 4.1 4.2 4.2 3.9 4.1   Speed 5000 5000 5050 5000 5050 5000 5000   PI  2.7 3.8 3.2 4.4 4.8 4.6   Power ([place])  3.4 3.5 3.5 3.4 3.4 3.5   LSL 4600 4600 4600 4600 4600 4600 4600   Pulsatility  Intermittent pulse Intermittent pulse Intermittent pulse Intermittent pulse

## 2024-06-30 NOTE — CARE UPDATE
-Glucose Goal 140-180    -A1C:   Hemoglobin A1C   Date Value Ref Range Status   04/17/2024 7.4 (H) 4.0 - 5.6 % Final     Comment:     ADA Screening Guidelines:  5.7-6.4%  Consistent with prediabetes  >or=6.5%  Consistent with diabetes    High levels of fetal hemoglobin interfere with the HbA1C  assay. Heterozygous hemoglobin variants (HbS, HgC, etc)do  not significantly interfere with this assay.   However, presence of multiple variants may affect accuracy.           -HOME REGIMEN: Amaryl 1 mg (recently prescribed by PCP about a week ago     -GLUCOSE TREND FOR THE PAST 24HRS:   Recent Labs   Lab 06/28/24  1218 06/28/24  1713 06/29/24  0818 06/29/24  1253 06/29/24  1718 06/29/24 2019   POCTGLUCOSE 174* 165* 121* 153* 183* 167*         -NO HYPOGYCEMIAS NOTED     - Diet  Diet Adult Regular Fluid - 1500mL    T2DM.  LVAD. Admitted for worsening renal function with a BUN>100/Cr. 9.9. Hypoglycemia likely secondary to RIVAS (glimepiride) with worsened kidney function.  Required TF, now off.  BG stable, mostly at goal.  Will continue to monitor.     Plan:  -Novolog /25  -BG monitoring ac/hs  -Hypoglycemia protocol in place    ** Please call Endocrine for any BG related issues **     Discharge plans:   TBD  -D/C Amaryl

## 2024-06-30 NOTE — PROGRESS NOTES
06/30/2024  [place]    Current provider:  Juventino Bermudez Jr.*    Device interrogation:      6/30/2024     5:48 AM 6/30/2024     1:00 AM 6/29/2024     8:00 PM 6/29/2024     4:45 PM 6/29/2024    12:33 PM 6/29/2024     8:23 AM 6/29/2024     1:32 AM   TXP LVAD INTERROGATIONS   Type HeartMate3 HeartMate3 HeartMate3 HeartMate3 HeartMate3 HeartMate3 HeartMate3   Flow 4.1 4.2 4.2 3.9 4.1 3.8 3.4   Speed 5050 5000 5050 5000 5000 5000 5000   PI 3.8 3.2 4.4 4.8 4.6 5.3 5.4   Power ([place]) 3.5 3.5 3.4 3.4 3.5 3.4 3.4   LSL 4600 4600 4600 4600 4600 4600 4600   Pulsatility Intermittent pulse Intermittent pulse Intermittent pulse              Rounded on [facility] to ensure all mechanical assist device settings (IABP or VAD) were appropriate and all parameters were within limits.  I was able to ensure all back up equipment was present, the staff had no issues, and the Perfusion Department daily rounding was complete.      For implantable VADs: Interrogation of Ventricular assist device was performed with analysis of device parameters and review of device function. I have personally reviewed the interrogation findings and agree with findings as stated.     In emergency, the nursing units have been notified to contact the perfusion department either by:  Calling [phone] from 630am to 4pm Mon thru Fri, utilizing the On-Call Finder functionality of Epic and searching for Perfusion, or by contacting the hospital  from 4pm to 630am and on weekends and asking to speak with the perfusionist on call.    7:54 AM

## 2024-06-30 NOTE — SUBJECTIVE & OBJECTIVE
Interval History: No acute events overnight. Tolerated HD well with net fluid removal 500cc yesterday. He has no complaints. Working with PT/OT    Continuous Infusions:  Scheduled Meds:   amiodarone  400 mg Oral Daily    amoxicillin  500 mg Per NG tube Daily    atorvastatin  40 mg Oral QHS    cyanocobalamin  250 mcg Oral Daily    epoetin zohaib (PROCRIT) injection  10,000 Units Intravenous Every Mon, Wed, Fri    folic acid  1 mg Oral Daily    gabapentin  100 mg Oral BID    LIDOcaine  2 patch Transdermal Q24H    midodrine  10 mg Oral Q8H    pantoprazole  40 mg Oral Daily    QUEtiapine  100 mg Oral QHS    sevelamer carbonate  0.8 g Oral TID WM    sodium bicarbonate  650 mg Oral TID    venlafaxine  37.5 mg Oral Daily    vitamin D  1,000 Units Oral Daily    warfarin  2.5 mg Oral Daily     PRN Meds:  Current Facility-Administered Medications:     0.9%  NaCl infusion (for blood administration), , Intravenous, Q24H PRN    0.9% NaCl, , Intravenous, PRN    acetaminophen, 650 mg, Oral, Q6H PRN    acetaminophen, 650 mg, Per NG tube, Q6H PRN    dextrose 10%, 12.5 g, Intravenous, PRN    dextrose 10%, 12.5 g, Intravenous, PRN    dextrose 10%, 25 g, Intravenous, PRN    dextrose 10%, 25 g, Intravenous, PRN    glucagon (human recombinant), 1 mg, Intramuscular, PRN    glucose, 16 g, Oral, PRN    glucose, 24 g, Oral, PRN    insulin aspart U-100, 0-10 Units, Subcutaneous, QID (AC + HS) PRN    simethicone, 1 tablet, Oral, TID PRN    traZODone, 25 mg, Oral, Nightly PRN    Review of patient's allergies indicates:  No Known Allergies  Objective:     Vital Signs (Most Recent):  Temp: 97.4 °F (36.3 °C) (06/30/24 1105)  Pulse: 85 (06/30/24 1059)  Resp: 18 (06/30/24 1105)  BP: (!) 94/0 (06/30/24 1208)  SpO2: 98 % (06/30/24 1105) Vital Signs (24h Range):  Temp:  [97.4 °F (36.3 °C)-98.9 °F (37.2 °C)] 97.4 °F (36.3 °C)  Pulse:  [85-98] 85  Resp:  [18] 18  SpO2:  [93 %-99 %] 98 %  BP: (74-97)/(0-69) 94/0     Patient Vitals for the past 72 hrs (Last  3 readings):   Weight   06/30/24 0500 72.5 kg (159 lb 13.3 oz)   06/28/24 0744 71.8 kg (158 lb 4.6 oz)     Body mass index is 21.09 kg/m².      Intake/Output Summary (Last 24 hours) at 6/30/2024 1350  Last data filed at 6/29/2024 2032  Gross per 24 hour   Intake 480 ml   Output --   Net 480 ml              Physical Exam  Constitutional:       Appearance: Normal appearance.   Cardiovascular:      Comments: JVP at clavicle; VAD hum  Pulmonary:      Effort: Pulmonary effort is normal.      Breath sounds: Normal breath sounds.   Musculoskeletal:      Right lower leg: No edema.      Left lower leg: No edema.   Skin:     General: Skin is warm and dry.   Neurological:      Mental Status: He is alert.            Significant Labs:  CBC:  Recent Labs   Lab 06/25/24  0407 06/26/24  0500 06/27/24  0428   WBC 6.26 7.52 6.88   RBC 3.02* 2.72* 2.93*   HGB 8.3* 7.5* 8.3*   HCT 25.9* 24.0* 26.3*    460* 475*   MCV 86 88 90   MCH 27.5 27.6 28.3   MCHC 32.0 31.3* 31.6*     BNP:  Recent Labs   Lab 06/24/24  0533 06/26/24  0500 06/28/24  0406   * 598* 1,736*     CMP:  Recent Labs   Lab 06/24/24  0533 06/25/24  0407 06/26/24  0500 06/27/24  0427 06/28/24  0406 06/30/24  0341   GLU 89   < > 93 133*  --  73   CALCIUM 9.0   < > 8.4* 8.7  --  8.5*   ALBUMIN 2.0*  --  2.0*  --  1.9*  --    PROT 7.2  --  7.3  --  6.8  --       < > 139 138  --  135*   K 4.4   < > 4.1 4.2  --  4.6   CO2 27   < > 25 21*  --  25   CL 93*   < > 101 102  --  101   BUN 89*   < > 42* 57*  --  40*   CREATININE 6.1*   < > 4.0* 5.8*  --  4.9*   ALKPHOS 256*  --  245*  --  195*  --    ALT 70*  --  55*  --  47*  --    AST 68*  --  64*  --  61*  --    BILITOT 0.5  --  0.5  --  0.4  --     < > = values in this interval not displayed.      Coagulation:   Recent Labs   Lab 06/28/24  0406 06/29/24 0448 06/30/24  0341   INR 2.4* 2.5* 2.4*   APTT 43.0* 43.6* 44.6*     LDH:  Recent Labs   Lab 06/28/24  0406 06/29/24  0448 06/30/24  0341   * 278* 273*  "    Microbiology:  Microbiology Results (last 7 days)       ** No results found for the last 168 hours. **            BMP:   Recent Labs   Lab 06/30/24  0341   GLU 73   *   K 4.6      CO2 25   BUN 40*   CREATININE 4.9*   CALCIUM 8.5*     Cardiac Markers: No results for input(s): "CKMB", "TROPONINT", "MYOGLOBIN" in the last 72 hours.  Coagulation:   Recent Labs   Lab 06/30/24  0341   INR 2.4*   APTT 44.6*     I have reviewed all pertinent labs within the past 24 hours.    Estimated Creatinine Clearance: 16 mL/min (A) (based on SCr of 4.9 mg/dL (H)).    Diagnostic Results:  I have reviewed and interpreted all pertinent imaging results/findings within the past 24 hours.  "

## 2024-06-30 NOTE — PLAN OF CARE
Problem: Adult Inpatient Plan of Care  Goal: Plan of Care Review  Outcome: Progressing     Problem: Adult Inpatient Plan of Care  Goal: Absence of Hospital-Acquired Illness or Injury  Outcome: Progressing     Problem: Infection  Goal: Absence of Infection Signs and Symptoms  Outcome: Progressing     Problem: Diabetes Comorbidity  Goal: Blood Glucose Level Within Targeted Range  Outcome: Progressing     Problem: Acute Kidney Injury/Impairment  Goal: Fluid and Electrolyte Balance  Outcome: Progressing     Problem: Skin Injury Risk Increased  Goal: Skin Health and Integrity  Outcome: Progressing     Problem: Ventricular Assist Device  Goal: Optimal Adjustment to Device  Outcome: Progressing     Problem: Wound  Goal: Absence of Infection Signs and Symptoms  Outcome: Progressing     Problem: Wound  Goal: Skin Health and Integrity  Outcome: Progressing

## 2024-06-30 NOTE — PROGRESS NOTES
[facility] - Cardiology Stepdown  Heart Transplant  Progress Note    Patient Name: Radha Abbott  MRN: [id number]  Admission Date: 6/11/2024  Hospital Length of Stay: 19 days  Attending Physician: Juventino Bermudez Jr.*  Primary Care Provider: Vasu Kong MD  Principal Problem:Acute renal failure superimposed on stage 4 chronic kidney disease    Subjective:   Interval History: No acute events overnight. Tolerated HD well with net fluid removal 500cc yesterday. He has no complaints. Working with PT/OT    Continuous Infusions:  Scheduled Meds:   amiodarone  400 mg Oral Daily    amoxicillin  500 mg Per NG tube Daily    atorvastatin  40 mg Oral QHS    cyanocobalamin  250 mcg Oral Daily    epoetin zohaib (PROCRIT) injection  10,000 Units Intravenous Every Mon, Wed, Fri    folic acid  1 mg Oral Daily    gabapentin  100 mg Oral BID    LIDOcaine  2 patch Transdermal Q24H    midodrine  10 mg Oral Q8H    pantoprazole  40 mg Oral Daily    QUEtiapine  100 mg Oral QHS    sevelamer carbonate  0.8 g Oral TID WM    sodium bicarbonate  650 mg Oral TID    venlafaxine  37.5 mg Oral Daily    vitamin D  1,000 Units Oral Daily    warfarin  2.5 mg Oral Daily     PRN Meds:  Current Facility-Administered Medications:     0.9%  NaCl infusion (for blood administration), , Intravenous, Q24H PRN    0.9% NaCl, , Intravenous, PRN    acetaminophen, 650 mg, Oral, Q6H PRN    acetaminophen, 650 mg, Per NG tube, Q6H PRN    dextrose 10%, 12.5 g, Intravenous, PRN    dextrose 10%, 12.5 g, Intravenous, PRN    dextrose 10%, 25 g, Intravenous, PRN    dextrose 10%, 25 g, Intravenous, PRN    glucagon (human recombinant), 1 mg, Intramuscular, PRN    glucose, 16 g, Oral, PRN    glucose, 24 g, Oral, PRN    insulin aspart U-100, 0-10 Units, Subcutaneous, QID (AC + HS) PRN    simethicone, 1 tablet, Oral, TID PRN    traZODone, 25 mg, Oral, Nightly PRN    Review of patient's allergies indicates:  No Known Allergies  Objective:     Vital Signs (Most  Recent):  Temp: 97.4 °F (36.3 °C) (06/30/24 1105)  Pulse: 85 (06/30/24 1059)  Resp: 18 (06/30/24 1105)  BP: (!) 94/0 (06/30/24 1208)  SpO2: 98 % (06/30/24 1105) Vital Signs (24h Range):  Temp:  [97.4 °F (36.3 °C)-98.9 °F (37.2 °C)] 97.4 °F (36.3 °C)  Pulse:  [85-98] 85  Resp:  [18] 18  SpO2:  [93 %-99 %] 98 %  BP: (74-97)/(0-69) 94/0     Patient Vitals for the past 72 hrs (Last 3 readings):   Weight   06/30/24 0500 72.5 kg (159 lb 13.3 oz)   06/28/24 0744 71.8 kg (158 lb 4.6 oz)     Body mass index is 21.09 kg/m².      Intake/Output Summary (Last 24 hours) at 6/30/2024 1350  Last data filed at 6/29/2024 2032  Gross per 24 hour   Intake 480 ml   Output --   Net 480 ml              Physical Exam  Constitutional:       Appearance: Normal appearance.   Cardiovascular:      Comments: JVP at clavicle; VAD hum  Pulmonary:      Effort: Pulmonary effort is normal.      Breath sounds: Normal breath sounds.   Musculoskeletal:      Right lower leg: No edema.      Left lower leg: No edema.   Skin:     General: Skin is warm and dry.   Neurological:      Mental Status: He is alert.            Significant Labs:  CBC:  Recent Labs   Lab 06/25/24  0407 06/26/24  0500 06/27/24  0428   WBC 6.26 7.52 6.88   RBC 3.02* 2.72* 2.93*   HGB 8.3* 7.5* 8.3*   HCT 25.9* 24.0* 26.3*    460* 475*   MCV 86 88 90   MCH 27.5 27.6 28.3   MCHC 32.0 31.3* 31.6*     BNP:  Recent Labs   Lab 06/24/24  0533 06/26/24  0500 06/28/24  0406   * 598* 1,736*     CMP:  Recent Labs   Lab 06/24/24  0533 06/25/24  0407 06/26/24  0500 06/27/24  0427 06/28/24  0406 06/30/24  0341   GLU 89   < > 93 133*  --  73   CALCIUM 9.0   < > 8.4* 8.7  --  8.5*   ALBUMIN 2.0*  --  2.0*  --  1.9*  --    PROT 7.2  --  7.3  --  6.8  --       < > 139 138  --  135*   K 4.4   < > 4.1 4.2  --  4.6   CO2 27   < > 25 21*  --  25   CL 93*   < > 101 102  --  101   BUN 89*   < > 42* 57*  --  40*   CREATININE 6.1*   < > 4.0* 5.8*  --  4.9*   ALKPHOS 256*  --  245*  --  195*   "--    ALT 70*  --  55*  --  47*  --    AST 68*  --  64*  --  61*  --    BILITOT 0.5  --  0.5  --  0.4  --     < > = values in this interval not displayed.      Coagulation:   Recent Labs   Lab 06/28/24 0406 06/29/24 0448 06/30/24 0341   INR 2.4* 2.5* 2.4*   APTT 43.0* 43.6* 44.6*     LDH:  Recent Labs   Lab 06/28/24 0406 06/29/24 0448 06/30/24 0341   * 278* 273*     Microbiology:  Microbiology Results (last 7 days)       ** No results found for the last 168 hours. **            BMP:   Recent Labs   Lab 06/30/24 0341   GLU 73   *   K 4.6      CO2 25   BUN 40*   CREATININE 4.9*   CALCIUM 8.5*     Cardiac Markers: No results for input(s): "CKMB", "TROPONINT", "MYOGLOBIN" in the last 72 hours.  Coagulation:   Recent Labs   Lab 06/30/24 0341   INR 2.4*   APTT 44.6*     I have reviewed all pertinent labs within the past 24 hours.    Estimated Creatinine Clearance: 16 mL/min (A) (based on SCr of 4.9 mg/dL (H)).    Diagnostic Results:  I have reviewed and interpreted all pertinent imaging results/findings within the past 24 hours.  Assessment and Plan:     Mr. Radha Abbott is a 65 yo male with stage D HFrEF, ICMP who underwent HM3 placement as DT (12/1/23) w/ a hospital stay complicated by vasoplegia(requiring Giaprezza), debility, malnutrition/impaired swallowing, and renal failure requiring HD (since weaned off) w/ recent discharge for ADHF presented to the ED with wife due to c/o worsening generalized weakness since 6/10. Per wife patient was mostly sleeping during the day despite sleeping 10 hrs at night. Patient also admits decreased appetite and decreased urine output despite taking his medications as prescribed. He has not urinated since 6/11 at 2pm. He has chronic shortness of breath on exertion. Denies confusion, syncope, diarrhea, constipation, N/V, dysuria, or other complaints. Patient had his prior tunnel cath removed used for HD about 2 weeks ago. He completed a 6 week therapy for " Enterococcus bacteremia with ampicillin and rocephin ending 5/30/24a and was switched to PO antibiotics.     On prior hospital stay nephrology consulted during hospital stay due to elevated renal function. They feel he is close to being a dialysis patient. He diuresed on IV Bumex with prn Metolazone. He is net negative 1.2L throughout his hospital stay and weight on day of discharge was 179lbs down from 186lbs on admit. We continued his home dose of Bumex 4mg BID but increased his prn Metolazone to 10mg. He received a dose of Epo while inpatient and he has plans to follow up with Heme/Onc to get outpatient therapy arranged.      2D Echo with CFD done on 3/26/2024  LVAD: There is a Heartmate III LVAD running at 5000 RPM. The aortic valve does not open. The ventricular septum is at midline. Inflow cannula well seated at the apex. Outflow graft not visualized.   Left Ventricle: The left ventricle is moderately dilated. Normal wall thickness. Global hypokinesis present. Septal motion is abnormal. There is severely reduced systolic function with a visually estimated ejection fraction of 5 - 10%. There is diastolic dysfunction but grade cannot be determined.   Right Ventricle: Mild right ventricular enlargement. Wall thickness is normal. Right ventricle wall motion is normal. Systolic function is normal. Pacemaker lead present in the ventricle.   Left Atrium: Left atrium is severely dilated.   Right Atrium: Right atrium is moderately dilated.   Mitral Valve: The mitral valve is repaired with an Nolbe stitch. There is mild regurgitation.   IVC/SVC: Normal venous pressure at 3 mmHg.              * Acute renal failure superimposed on stage 4 chronic kidney disease  -Mr. 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 Admitted for worsening renal function with a BUN>100/Cr. 9.9.  Currently oliguric.  - CT Ab/pel: Kidneys/ Ureters: Normal in size and location.  Mild bilateral perinephric stranding, a nonspecific finding and similar to prior.  No hydronephrosis or nephrolithiasis. No ureteral dilatation.  - tunneled line placed 6/24 for HD   - HD per nephro   - Will need OP HD upon discharge, recommend CM/SW involvement to start workup.  - Renally dosing medications      LVAD (left ventricular assist device) present  -HeartMate 3 Implanted  12/1/2023  as DT  -Cont Coumadin, dosing by PharmD.  Goal INR 2.0-3.0. therapeutic today. Previous home dose was 2.5mg Qdaily  -Antiplatelets Not on ASA  -LDH is stable overall today. Will continue to monitor daily.  -Speed set at  5000     -Interrogation notable for no events  -Not listed for OHTx, declined for OHTX due to comorbidities     Procedure: Device Interrogation Including analysis of device parameters  Current Settings: Ventricular Assist Device  Review of device function is stable        6/30/2024    12:15 PM 6/30/2024     7:48 AM 6/30/2024     5:48 AM 6/30/2024     1:00 AM 6/29/2024     8:00 PM 6/29/2024     4:45 PM 6/29/2024    12:33 PM   TXP LVAD INTERROGATIONS   Type HeartMate3 HeartMate3 HeartMate3 HeartMate3 HeartMate3 HeartMate3 HeartMate3   Flow  4.1 4.1 4.2 4.2 3.9 4.1   Speed 5000 5000 5050 5000 5050 5000 5000   PI  2.7 3.8 3.2 4.4 4.8 4.6   Power ([place])  3.4 3.5 3.5 3.4 3.4 3.5   LSL 4600 4600 4600 4600 4600 4600 4600   Pulsatility  Intermittent pulse Intermittent pulse Intermittent pulse Intermittent pulse           Acute on chronic combined systolic and diastolic heart failure  -Owing to his advanced kidney disease and significantly elevated creat, he is not on a  ACEI/ARB/ARNI or MRA. He is also not on a beta blocker due to RV dysfunction.   -ICM  -Last 2D Echo 6/15/24: LVEF 5-10%, LVEDD 6.28 cm with speed at 5000  -Euvolemic on examination today  -Volume removal with HD  -2g Na dietary restriction, 1500 mL fluid restriction,  strict I/Os      PAF (paroxysmal atrial fibrillation)  -Continue amiodarone and Coumadin      Anticoagulant long-term use  -Cont warfarin    CAD (coronary artery disease)  -Continue statin    Anemia  Chronic with no acute bleeding, multifactorial 2/2 CKD4 and chronic anticoagulation.  Low-normal Vitamin B12 and elevated MMA c/w Vitamin B12 deficiency.  -s/p Epo   -Replaced IV iron   -Vitamin B12 supplementation  -reduce lab frequency given stable CBC and electrolytes    Type 2 diabetes mellitus without complication, without long-term current use of insulin  -Endocrine    Bacteremia due to Enterococcus  He completed a 6 week therapy for Enterococcus bacteremia with ampicillin and rocephin ending 5/30/24a and was switched to Amoxicillin for suppression    Adjustment disorder with depressed mood  Cont SSRI        DENIZ Faye  Heart Transplant  [facility] - Cardiology Stepdown

## 2024-06-30 NOTE — ASSESSMENT & PLAN NOTE
-Owing to his advanced kidney disease and significantly elevated creat, he is not on a  ACEI/ARB/ARNI or MRA. He is also not on a beta blocker due to RV dysfunction.   -ICM  -Last 2D Echo 6/15/24: LVEF 5-10%, LVEDD 6.28 cm with speed at 5000  -Euvolemic on examination today  -Volume removal with HD  -2g Na dietary restriction, 1500 mL fluid restriction, strict I/Os

## 2024-07-01 ENCOUNTER — DOCUMENT SCAN (OUTPATIENT)
Dept: HOME HEALTH SERVICES | Facility: HOSPITAL | Age: 64
End: 2024-07-01
Payer: MEDICAID

## 2024-07-01 VITALS
OXYGEN SATURATION: 98 % | SYSTOLIC BLOOD PRESSURE: 74 MMHG | TEMPERATURE: 98 F | HEIGHT: 73 IN | RESPIRATION RATE: 18 BRPM | HEART RATE: 90 BPM | BODY MASS INDEX: 21.36 KG/M2 | WEIGHT: 161.19 LBS

## 2024-07-01 LAB
ALBUMIN SERPL BCP-MCNC: 2.1 G/DL (ref 3.5–5.2)
ALP SERPL-CCNC: 176 U/L (ref 55–135)
ALT SERPL W/O P-5'-P-CCNC: 47 U/L (ref 10–44)
ANION GAP SERPL CALC-SCNC: 11 MMOL/L (ref 8–16)
APTT PPP: 44.1 SEC (ref 21–32)
AST SERPL-CCNC: 61 U/L (ref 10–40)
BASOPHILS # BLD AUTO: 0.06 K/UL (ref 0–0.2)
BASOPHILS NFR BLD: 0.8 % (ref 0–1.9)
BILIRUB DIRECT SERPL-MCNC: 0.2 MG/DL (ref 0.1–0.3)
BILIRUB SERPL-MCNC: 0.4 MG/DL (ref 0.1–1)
BNP SERPL-MCNC: 805 PG/ML (ref 0–99)
BUN SERPL-MCNC: 23 MG/DL (ref 8–23)
CALCIUM SERPL-MCNC: 8.7 MG/DL (ref 8.7–10.5)
CHLORIDE SERPL-SCNC: 101 MMOL/L (ref 95–110)
CO2 SERPL-SCNC: 25 MMOL/L (ref 23–29)
CREAT SERPL-MCNC: 3.4 MG/DL (ref 0.5–1.4)
CRP SERPL-MCNC: 102.6 MG/L (ref 0–8.2)
DIFFERENTIAL METHOD BLD: ABNORMAL
EOSINOPHIL # BLD AUTO: 0.2 K/UL (ref 0–0.5)
EOSINOPHIL NFR BLD: 3 % (ref 0–8)
ERYTHROCYTE [DISTWIDTH] IN BLOOD BY AUTOMATED COUNT: 18.3 % (ref 11.5–14.5)
EST. GFR  (NO RACE VARIABLE): 19.6 ML/MIN/1.73 M^2
GLUCOSE SERPL-MCNC: 75 MG/DL (ref 70–110)
HCT VFR BLD AUTO: 28.9 % (ref 40–54)
HGB BLD-MCNC: 8.5 G/DL (ref 14–18)
IMM GRANULOCYTES # BLD AUTO: 0.04 K/UL (ref 0–0.04)
IMM GRANULOCYTES NFR BLD AUTO: 0.6 % (ref 0–0.5)
INR PPP: 2.2 (ref 0.8–1.2)
LDH SERPL L TO P-CCNC: 270 U/L (ref 110–260)
LYMPHOCYTES # BLD AUTO: 0.9 K/UL (ref 1–4.8)
LYMPHOCYTES NFR BLD: 12.1 % (ref 18–48)
MAGNESIUM SERPL-MCNC: 2.1 MG/DL (ref 1.6–2.6)
MCH RBC QN AUTO: 27.5 PG (ref 27–31)
MCHC RBC AUTO-ENTMCNC: 29.4 G/DL (ref 32–36)
MCV RBC AUTO: 94 FL (ref 82–98)
MONOCYTES # BLD AUTO: 0.6 K/UL (ref 0.3–1)
MONOCYTES NFR BLD: 7.7 % (ref 4–15)
NEUTROPHILS # BLD AUTO: 5.4 K/UL (ref 1.8–7.7)
NEUTROPHILS NFR BLD: 75.8 % (ref 38–73)
NRBC BLD-RTO: 0 /100 WBC
PHOSPHATE SERPL-MCNC: 3.4 MG/DL (ref 2.7–4.5)
PLATELET # BLD AUTO: 491 K/UL (ref 150–450)
PMV BLD AUTO: 10.4 FL (ref 9.2–12.9)
POCT GLUCOSE: 88 MG/DL (ref 70–110)
POTASSIUM SERPL-SCNC: 4 MMOL/L (ref 3.5–5.1)
PREALB SERPL-MCNC: 19 MG/DL (ref 20–43)
PROT SERPL-MCNC: 7.1 G/DL (ref 6–8.4)
PROTHROMBIN TIME: 22.7 SEC (ref 9–12.5)
RBC # BLD AUTO: 3.09 M/UL (ref 4.6–6.2)
SODIUM SERPL-SCNC: 137 MMOL/L (ref 136–145)
WBC # BLD AUTO: 7.11 K/UL (ref 3.9–12.7)

## 2024-07-01 PROCEDURE — 25000003 PHARM REV CODE 250: Performed by: NURSE PRACTITIONER

## 2024-07-01 PROCEDURE — 97530 THERAPEUTIC ACTIVITIES: CPT

## 2024-07-01 PROCEDURE — 80048 BASIC METABOLIC PNL TOTAL CA: CPT | Performed by: STUDENT IN AN ORGANIZED HEALTH CARE EDUCATION/TRAINING PROGRAM

## 2024-07-01 PROCEDURE — 63600175 PHARM REV CODE 636 W HCPCS: Mod: JZ | Performed by: NURSE PRACTITIONER

## 2024-07-01 PROCEDURE — 25000003 PHARM REV CODE 250: Performed by: REGISTERED NURSE

## 2024-07-01 PROCEDURE — 84100 ASSAY OF PHOSPHORUS: CPT | Performed by: STUDENT IN AN ORGANIZED HEALTH CARE EDUCATION/TRAINING PROGRAM

## 2024-07-01 PROCEDURE — 99232 SBSQ HOSP IP/OBS MODERATE 35: CPT | Mod: ,,, | Performed by: PHYSICIAN ASSISTANT

## 2024-07-01 PROCEDURE — 99233 SBSQ HOSP IP/OBS HIGH 50: CPT | Mod: ,,, | Performed by: PHYSICIAN ASSISTANT

## 2024-07-01 PROCEDURE — 27000248 HC VAD-ADDITIONAL DAY

## 2024-07-01 PROCEDURE — 25000003 PHARM REV CODE 250: Performed by: STUDENT IN AN ORGANIZED HEALTH CARE EDUCATION/TRAINING PROGRAM

## 2024-07-01 PROCEDURE — 83735 ASSAY OF MAGNESIUM: CPT | Performed by: STUDENT IN AN ORGANIZED HEALTH CARE EDUCATION/TRAINING PROGRAM

## 2024-07-01 PROCEDURE — 84134 ASSAY OF PREALBUMIN: CPT | Performed by: STUDENT IN AN ORGANIZED HEALTH CARE EDUCATION/TRAINING PROGRAM

## 2024-07-01 PROCEDURE — 97535 SELF CARE MNGMENT TRAINING: CPT

## 2024-07-01 PROCEDURE — 85610 PROTHROMBIN TIME: CPT | Performed by: STUDENT IN AN ORGANIZED HEALTH CARE EDUCATION/TRAINING PROGRAM

## 2024-07-01 PROCEDURE — 93750 INTERROGATION VAD IN PERSON: CPT | Mod: ,,, | Performed by: INTERNAL MEDICINE

## 2024-07-01 PROCEDURE — 97116 GAIT TRAINING THERAPY: CPT

## 2024-07-01 PROCEDURE — 36415 COLL VENOUS BLD VENIPUNCTURE: CPT | Performed by: STUDENT IN AN ORGANIZED HEALTH CARE EDUCATION/TRAINING PROGRAM

## 2024-07-01 PROCEDURE — 86140 C-REACTIVE PROTEIN: CPT | Performed by: STUDENT IN AN ORGANIZED HEALTH CARE EDUCATION/TRAINING PROGRAM

## 2024-07-01 PROCEDURE — 83880 ASSAY OF NATRIURETIC PEPTIDE: CPT | Performed by: STUDENT IN AN ORGANIZED HEALTH CARE EDUCATION/TRAINING PROGRAM

## 2024-07-01 PROCEDURE — 99222 1ST HOSP IP/OBS MODERATE 55: CPT | Mod: ,,, | Performed by: PHYSICAL MEDICINE & REHABILITATION

## 2024-07-01 PROCEDURE — 99232 SBSQ HOSP IP/OBS MODERATE 35: CPT | Mod: ,,, | Performed by: NURSE PRACTITIONER

## 2024-07-01 PROCEDURE — 80076 HEPATIC FUNCTION PANEL: CPT | Performed by: STUDENT IN AN ORGANIZED HEALTH CARE EDUCATION/TRAINING PROGRAM

## 2024-07-01 PROCEDURE — 85730 THROMBOPLASTIN TIME PARTIAL: CPT | Performed by: STUDENT IN AN ORGANIZED HEALTH CARE EDUCATION/TRAINING PROGRAM

## 2024-07-01 PROCEDURE — 85025 COMPLETE CBC W/AUTO DIFF WBC: CPT | Performed by: STUDENT IN AN ORGANIZED HEALTH CARE EDUCATION/TRAINING PROGRAM

## 2024-07-01 PROCEDURE — 83615 LACTATE (LD) (LDH) ENZYME: CPT | Performed by: STUDENT IN AN ORGANIZED HEALTH CARE EDUCATION/TRAINING PROGRAM

## 2024-07-01 RX ORDER — SODIUM BICARBONATE 650 MG/1
650 TABLET ORAL 3 TIMES DAILY
Start: 2024-07-01 | End: 2025-07-01

## 2024-07-01 RX ORDER — PANTOPRAZOLE SODIUM 40 MG/1
40 TABLET, DELAYED RELEASE ORAL DAILY
Start: 2024-07-02 | End: 2025-07-02

## 2024-07-01 RX ORDER — SEVELAMER CARBONATE FOR ORAL SUSPENSION 800 MG/1
0.8 POWDER, FOR SUSPENSION ORAL
Start: 2024-07-01 | End: 2025-07-01

## 2024-07-01 RX ORDER — INSULIN ASPART 100 [IU]/ML
0-10 INJECTION, SOLUTION INTRAVENOUS; SUBCUTANEOUS
Start: 2024-07-01 | End: 2025-07-01

## 2024-07-01 RX ORDER — MIDODRINE HYDROCHLORIDE 10 MG/1
10 TABLET ORAL EVERY 8 HOURS
Start: 2024-07-01 | End: 2025-07-01

## 2024-07-01 RX ORDER — AMOXICILLIN 500 MG/1
500 CAPSULE ORAL DAILY
Start: 2024-07-01

## 2024-07-01 RX ADMIN — SEVELAMER CARBONATE 0.8 G: 800 POWDER, FOR SUSPENSION ORAL at 08:07

## 2024-07-01 RX ADMIN — VENLAFAXINE 37.5 MG: 37.5 TABLET ORAL at 08:07

## 2024-07-01 RX ADMIN — GABAPENTIN 100 MG: 100 CAPSULE ORAL at 08:07

## 2024-07-01 RX ADMIN — PANTOPRAZOLE SODIUM 40 MG: 40 TABLET, DELAYED RELEASE ORAL at 08:07

## 2024-07-01 RX ADMIN — AMIODARONE HYDROCHLORIDE 400 MG: 200 TABLET ORAL at 08:07

## 2024-07-01 RX ADMIN — CYANOCOBALAMIN TAB 250 MCG 250 MCG: 250 TAB at 08:07

## 2024-07-01 RX ADMIN — FOLIC ACID 1 MG: 1 TABLET ORAL at 08:07

## 2024-07-01 RX ADMIN — SODIUM BICARBONATE 650 MG TABLET 650 MG: at 08:07

## 2024-07-01 RX ADMIN — CHOLECALCIFEROL TAB 25 MCG (1000 UNIT) 1000 UNITS: 25 TAB at 08:07

## 2024-07-01 RX ADMIN — ERYTHROPOIETIN 10000 UNITS: 10000 INJECTION, SOLUTION INTRAVENOUS; SUBCUTANEOUS at 10:07

## 2024-07-01 NOTE — ASSESSMENT & PLAN NOTE
Patient with a baseline eGFR that appears to be variable however was noted to be in the 15-20 range in February before his recent IVÁN in May, presents now with fatigue and worsening IVÁN with a creatinine of 9.8 and eGFR of 5.5. BUN was noted to be elevated at 117, other electrolytes were normal. He was noted to have some asterixis on exam (which he states is chronic for him and unchanged from his baseline), however denies any other signs or symptoms of uremia. Bedside bladder scan revealed 200 mL of urine in his bladder and his last bladder void was 10 hours prior to this consult.     SLED initiated 6/13 x4 hours.  Mental status improved.      Recommendations:     -Will continue 3x week dialysis   -HD completed yesterday in anticipation of possible discharge to [facility] today. Tolerated well.   -Avoid nephrotoxic agents (IV contrast, NSAID's, gadolinium contrast, PPI's) if able.   -Strict I's and O's  -Renally dose all medications  -TDC placed by IR 6/24

## 2024-07-01 NOTE — NURSING
Rn attempted to call Rehab to give report. [facility] [place] said that they had not had paperwork done. She would give information to the nurse and had the nurse to call back.

## 2024-07-01 NOTE — PROGRESS NOTES
[facility] - [facility] Stepdown  Nephrology  Progress Note    Patient Name: Radha Abbott  MRN: [id number]  Admission Date: 6/11/2024  Hospital Length of Stay: 20 days  Attending Provider: Brayan Ocampo MD   Primary Care Physician: Vasu Kong MD  Principal Problem:Acute renal failure superimposed on stage 4 chronic kidney disease    Subjective:     Interval History: NAEON. HD yesterday, - 1 L, patient tolerated well. Plan for possible dc today to [facility] pending official acceptance and transport.     Review of patient's allergies indicates:  No Known Allergies  Current Facility-Administered Medications   Medication Frequency    0.9%  NaCl infusion (for blood administration) Q24H PRN    0.9%  NaCl infusion PRN    acetaminophen tablet 650 mg Q6H PRN    acetaminophen tablet 650 mg Q6H PRN    amiodarone tablet 400 mg Daily    amoxicillin 400 mg/5 mL suspension 500 mg Daily    atorvastatin tablet 40 mg QHS    cyanocobalamin tablet 250 mcg Daily    dextrose 10% bolus 125 mL 125 mL PRN    dextrose 10% bolus 125 mL 125 mL PRN    dextrose 10% bolus 250 mL 250 mL PRN    dextrose 10% bolus 250 mL 250 mL PRN    epoetin zohaib injection 10,000 Units Every Mon, Wed, Fri    folic acid tablet 1 mg Daily    gabapentin capsule 100 mg BID    glucagon (human recombinant) injection 1 mg PRN    glucose chewable tablet 16 g PRN    glucose chewable tablet 24 g PRN    insulin aspart U-100 pen 0-10 Units QID (AC + HS) PRN    LIDOcaine 5 % patch 2 patch Q24H    midodrine tablet 10 mg Q8H    pantoprazole EC tablet 40 mg Daily    QUEtiapine tablet 100 mg QHS    sevelamer carbonate pwpk 0.8 g TID WM    simethicone chewable tablet 80 mg TID PRN    sodium bicarbonate tablet 650 mg TID    trazodone split tablet 25 mg Nightly PRN    venlafaxine tablet 37.5 mg Daily    vitamin D 1000 units tablet 1,000 Units Daily    warfarin (COUMADIN) tablet 2.5 mg Daily       Objective:     Vital Signs (Most Recent):  Temp: 97.8 °F (36.6 °C) (07/01/24  1104)  Pulse: 90 (07/01/24 1105)  Resp: 18 (07/01/24 1104)  BP: (!) 74/0 (07/01/24 0745)  SpO2: 98 % (07/01/24 1104) Vital Signs (24h Range):  Temp:  [97.5 °F (36.4 °C)-98.7 °F (37.1 °C)] 97.8 °F (36.6 °C)  Pulse:  [78-93] 90  Resp:  [16-18] 18  SpO2:  [97 %-99 %] 98 %  BP: ()/(0-93) 74/0     Weight: 73.1 kg (161 lb 2.5 oz) (07/01/24 0515)  Body mass index is 21.26 kg/m².  Body surface area is 1.94 meters squared.    I/O last 3 completed shifts:  In: 840 [P.O.:840]  Out: 1600 [Urine:100; Other:1500]     Physical Exam  Vitals reviewed.   Constitutional:       General: He is not in acute distress.  HENT:      Head: Normocephalic and atraumatic.   Eyes:      Conjunctiva/sclera: Conjunctivae normal.   Neck:      Vascular: No JVD.   Cardiovascular:      Rate and Rhythm: Normal rate.   Pulmonary:      Effort: Pulmonary effort is normal. No respiratory distress.   Musculoskeletal:      Right lower leg: No edema.      Left lower leg: No edema.   Neurological:      Mental Status: He is alert and oriented to person, place, and time.   Psychiatric:         Mood and Affect: Mood normal.          Significant Labs:  CBC:   Recent Labs   Lab 07/01/24  0526   WBC 7.11   RBC 3.09*   HGB 8.5*   HCT 28.9*   *   MCV 94   MCH 27.5   MCHC 29.4*     CMP:   Recent Labs   Lab 07/01/24  0526   GLU 75   CALCIUM 8.7   ALBUMIN 2.1*   PROT 7.1      K 4.0   CO2 25      BUN 23   CREATININE 3.4*   ALKPHOS 176*   ALT 47*   AST 61*   BILITOT 0.4     All labs within the past 24 hours have been reviewed.     Assessment/Plan:     Cardiac/Vascular  LVAD (left ventricular assist device) present  -HTS following.    Renal/  * Acute renal failure superimposed on stage 4 chronic kidney disease  Patient with a baseline eGFR that appears to be variable however was noted to be in the 15-20 range in February before his recent IVÁN in May, presents now with fatigue and worsening IVÁN with a creatinine of 9.8 and eGFR of 5.5. BUN was noted  to be elevated at 117, other electrolytes were normal. He was noted to have some asterixis on exam (which he states is chronic for him and unchanged from his baseline), however denies any other signs or symptoms of uremia. Bedside bladder scan revealed 200 mL of urine in his bladder and his last bladder void was 10 hours prior to this consult.     SLED initiated 6/13 x4 hours.  Mental status improved.      Recommendations:     -Will continue 3x week dialysis   -HD completed yesterday in anticipation of possible discharge to [facility] today. Tolerated well.   -d/c bicarb, will provide bicarb bath with HD   -Avoid nephrotoxic agents (IV contrast, NSAID's, gadolinium contrast, PPI's) if able.   -Strict I's and O's  -Renally dose all medications  -TDC placed by IR 6/24    Chronic kidney disease-mineral and bone disorder  -  -low calcium bath  -phos binders with meals  -low phos diet    Oncology  Anemia  -EPO with HD  -Feraheme 6/19; 2nd dose 6/25        Thank you for your consult. I will follow-up with patient. Please contact us if you have any additional questions.    Yolanda Cai, RONI  Nephrology  [facility] - Cardiology Stepdown

## 2024-07-01 NOTE — PT/OT/SLP PROGRESS
Occupational Therapy   Co-Treatment  Co-treatment performed due to patient's multiple deficits requiring two skilled therapists to appropriately and safely assess patient's strength and endurance while facilitating functional tasks in addition to accommodating for patient's activity tolerance.      Name: Radha Abbott  MRN: [id number]  Admitting Diagnosis:  Acute renal failure superimposed on stage 4 chronic kidney disease       Recommendations:     Discharge Recommendations: High Intensity Therapy  Discharge Equipment Recommendations:  wheelchair  Barriers to discharge:  None    Assessment:     Radha Abbott is a 64 y.o. male with a medical diagnosis of Acute renal failure superimposed on stage 4 chronic kidney disease.  He presents with the following performance deficits affecting function are weakness, impaired endurance, impaired self care skills, impaired functional mobility, gait instability, impaired balance, decreased upper extremity function, decreased lower extremity function, decreased safety awareness, impaired cardiopulmonary response to activity. Pt agreeable to therapy and tolerated well. Pt switched from LVAD wall power>battery and placed batteries and controller in LVAD shirt. Pt displayed improvements in functional balance and endurance as evidenced by engaging in seated ADLs and functional ambulation. No SOB, LOB, or dizziness occurred. Pt would continue to benefit from skilled OT services to maximize functional independence with ADLs and functional mobility, reduce caregiver burden, and facilitate safe discharge in the least restrictive environment.      Rehab Prognosis:  Good; patient would benefit from acute skilled OT services to address these deficits and reach maximum level of function.       Plan:     Patient to be seen 4 x/week to address the above listed problems via self-care/home management, therapeutic activities, therapeutic exercises, neuromuscular re-education  Plan of Care Expires:  "07/14/24  Plan of Care Reviewed with: patient    Subjective   "I'm supposed to go to rehab today"  Chief Complaint: none stated  Patient/Family Comments/goals: to discharge to rehab  Pain/Comfort:  Pain Rating 1: 0/10  Pain Rating Post-Intervention 1: 0/10    Objective:   Additional staff present:  Chloé PT    Communicated with: RN prior to session.  Patient found HOB elevated with LVAD, telemetry upon OT entry to room.    General Precautions: Standard, fall, LVAD    Orthopedic Precautions:N/A  Braces: N/A  Respiratory Status: Room air     Occupational Performance:     Bed Mobility:    Patient completed Scooting/Bridging with stand by assistance  Patient completed Supine to Sit with stand by assistance     Functional Mobility/Transfers:  Patient completed Sit <> Stand Transfer with minimum assistance  with  no assistive device and rolling walker   From EOB and wheelchair  1st trial w/o AD  2nd trial with RW  Patient completed Bed <> Wheelchair Transfer using Step Transfer technique with minimum assistance with no assistive device and hand-held assist x2  Functional Mobility: Pt engaged in functional mobility to simulate household/community distances 36 ft with CGA and RW in order to maximize functional endurance and standing balance required for engagement in occupations of choice   No LOB or SOB noted  1 standing rest break required  Emergency bag present for hallway ambulation, w/c follow    Activities of Daily Living:  Lower Body Dressing: moderate assistance required to don compression socks and (R) shoe with AFO; pt able to doff hospital socks and don (L) shoe EOB       [facility] 6 Click ADL: 18    Treatment & Education:  -Education on energy conservation and task modification to maximize safety and (I) during ADLs and mobility  -Education on importance of OOB activity to improve overall activity tolerance and promote recovery  -Pt educated to call for assistance and to transfer with hospital staff " only  -Provided education regarding role of OT, POC, & discharge recommendations with pt verbalizing understanding.  Pt had no further questions & when asked whether there were any concerns pt reported none.     Patient left  up in wheelchair  with all lines intact, call button in reach, and RN notified    GOALS:   Multidisciplinary Problems       Occupational Therapy Goals          Problem: Occupational Therapy    Goal Priority Disciplines Outcome Interventions   Occupational Therapy Goal     OT, PT/OT Progressing    Description: Goals to be met by: 7/14/24     Patient will increase functional independence with ADLs by performing:    UE Dressing with Set-up Assistance.  LE Dressing with Minimal Assistance.  Grooming while standing at sink with Stand-by Assistance.  Toileting from toilet/bedside commode with Minimal Assistance for hygiene and clothing management.   Supine to sit with Stand-by Assistance.  Step transfer with Contact Guard Assistance  Toilet transfer to toilet/bedside commode with Contact Guard Assistance.                         Time Tracking:     OT Date of Treatment: 07/01/24  OT Start Time: 1037  OT Stop Time: 1102  OT Total Time (min): 25 min    Billable Minutes:Self Care/Home Management 10  Therapeutic Activity 15    OT/PRIETO: OT     Number of PRIETO visits since last OT visit: 0    7/1/2024

## 2024-07-01 NOTE — PROGRESS NOTES
[facility] - Cardiology Stepdown  Endocrinology  Progress Note    Admit Date: 6/11/2024     Reason for Consult: Management of T2DM, Hyperglycemia     Surgical Procedure and Date: HM3 placement as DT (12/1/23)     Diabetes diagnosis year: several years ago    Home Diabetes Medications:  Amaryl 1 mg (recently prescribed by PCP about a week ago)     Lab Results   Component Value Date    HGBA1C 7.4 (H) 04/17/2024       How often checking glucose at home? Once daily in the AM ( prior to starting Amaryl BG in the 300s, since starting Amaryl BG 70-130s)   BG readings on regimen: 70-130s  Hypoglycemia on the regimen?  No  Missed doses on regimen?  No    Diabetes Complications include:     Hypoglycemia , Diabetic nephropathy  , Diabetic chronic kidney disease     , and Diabetic peripheral neuropathy     Complicating diabetes co morbidities:   CHF and CKD      HPI:   Patient is a 64 y.o. male with a diagnosis of stage D HFrEF, ICMP who underwent HM3 placement as DT (12/1/23) w/ a hospital stay complicated by vasoplegia(requiring Giaprezza), debility, malnutrition/impaired swallowing, and renal failure requiring HD (since weaned off) w/ recent discharge for ADHF presented to the ED with wife due to c/o worsening generalized weakness since 6/10. Per wife patient was mostly sleeping during the day despite sleeping 10 hrs at night. Patient also admits decreased appetite and decreased urine output despite taking his medications as prescribed. He has chronic shortness of breath on exertion. Denies confusion, syncope, diarrhea, constipation, N/V, dysuria, or other complaints. Patient had his prior tunnel cath removed used for HD about 2 weeks ago. He completed a 6 week therapy for Enterococcus bacteremia with ampicillin and rocephin ending 5/30/24 and was switched to PO antibiotics. Off note, patient was recently started by his PCP on glimepiride 1mg and unclear of BG trends during the day. Per patient last fasting  on 6/11.  "Patient reports lasting taking the glimepiride on  AM.   Admitted for worsening renal function with a BUN>100/Cr. 9.9. Endocrinology consulted for management of T2DM.          Interval HPI:   No acute events overnight. Patient in room 319/319 A. Blood glucose stable. BG at goal on current insulin regimen (SSI ). Steroid use- None .   Renal function- Abnormal -   Vasopressors-  None      Diet Adult Regular Fluid - 1500mL      Eatin%  Nausea: No  Hypoglycemia and intervention: No  Fever: No  TPN and/or TF: No       BP (!) 74/0   Pulse 78   Temp 98.1 °F (36.7 °C) (Oral)   Resp 18   Ht 6' 1" (1.854 m)   Wt 73.1 kg (161 lb 2.5 oz)   SpO2 97%   BMI 21.26 kg/m²     Labs Reviewed and Include    Recent Labs   Lab 24  0526   GLU 75   CALCIUM 8.7   ALBUMIN 2.1*   PROT 7.1      K 4.0   CO2 25      BUN 23   CREATININE 3.4*   ALKPHOS 176*   ALT 47*   AST 61*   BILITOT 0.4     Lab Results   Component Value Date    WBC 6.88 2024    HGB 8.3 (L) 2024    HCT 26.3 (L) 2024    MCV 90 2024     (H) 2024     No results for input(s): "TSH", "FREET4" in the last 168 hours.  Lab Results   Component Value Date    HGBA1C 7.4 (H) 2024       Nutritional status:   Body mass index is 21.26 kg/m².  Lab Results   Component Value Date    ALBUMIN 2.1 (L) 2024    ALBUMIN 1.9 (L) 2024    ALBUMIN 2.0 (L) 2024     Lab Results   Component Value Date    PREALBUMIN 19 (L) 2024    PREALBUMIN 18 (L) 2024    PREALBUMIN 17 (L) 2024       Estimated Creatinine Clearance: 23.3 mL/min (A) (based on SCr of 3.4 mg/dL (H)).    Accu-Checks  Recent Labs     24  1713 24  0818 24  1253 24  1718 24  0745 24  1231 24  1714 24  0741   POCTGLUCOSE 165* 121* 153* 183* 167* 81 181* 164* 136* 88       Current Medications and/or Treatments Impacting Glycemic Control  Immunotherapy:  "   Immunosuppressants       None          Steroids:   Hormones (From admission, onward)      None          Pressors:    Autonomic Drugs (From admission, onward)      Start     Stop Route Frequency Ordered    06/19/24 1415  midodrine tablet 10 mg         -- Oral Every 8 hours 06/19/24 1414          Hyperglycemia/Diabetes Medications:   Antihyperglycemics (From admission, onward)      Start     Stop Route Frequency Ordered    06/23/24 0729  insulin aspart U-100 pen 0-10 Units         -- SubQ Before meals & nightly PRN 06/23/24 0629            ASSESSMENT and PLAN    Cardiac/Vascular  LVAD (left ventricular assist device) present  Managed per primary team  Avoid hypoglycemia        Renal/  * Acute renal failure superimposed on stage 4 chronic kidney disease  Titrate insulin slowly to avoid hypoglycemia as the risk of hypoglycemia increases with decreased creatinine clearance.        Endocrine  Type 2 diabetes mellitus without complication, without long-term current use of insulin  BG goal 140-180  T2DM.  LVAD. Admitted for worsening renal function with a BUN>100/Cr. 9.9. Hypoglycemia likely secondary to RIVAS (glimepiride) with worsened kidney function.  Required TF, now off.  BG stable, mostly at goal.  Will continue to monitor.    Plan:  -Novolog /25  -BG monitoring ac/hs  -Hypoglycemia protocol in place    ** Please call Endocrine for any BG related issues **    Discharge plans:   BRANDO  -D/C Camila Wilson PA-C  Endocrinology  [facility] - Cardiology Stepdown

## 2024-07-01 NOTE — PROGRESS NOTES
07/01/2024  Erasmo Hooper    Current provider:  Brayan Ocampo MD    Device interrogation:      7/1/2024     7:45 AM 7/1/2024     4:16 AM 6/30/2024    11:35 PM 6/30/2024     8:13 PM 6/30/2024     4:48 PM 6/30/2024     2:58 PM 6/30/2024    12:15 PM   TXP LVAD INTERROGATIONS   Type HeartMate3 HeartMate3 HeartMate3 HeartMate3 HeartMate3 HeartMate3 HeartMate3   Flow 3.9 4.3 4 3.7 3.5 4.4    Speed 5000 5050 5000 5050 5000 5000 5000   PI 4.7 3.7 4 6 6.6 4.1    Power ([place]) 3.4 3.4 3.4 3.4 3.4 3.4    LSL 4600 4600 4600 4600 4600 4600 4600   Low Flow Alarm  none none none      High Power Alarm  none none none      Pulsatility Intermittent pulse Pulse Pulse Intermittent pulse             Rounded on [facility] to ensure all mechanical assist device settings (IABP or VAD) were appropriate and all parameters were within limits.  I was able to ensure all back up equipment was present, the staff had no issues, and the Perfusion Department daily rounding was complete.      For implantable VADs: Interrogation of Ventricular assist device was performed with analysis of device parameters and review of device function. I have personally reviewed the interrogation findings and agree with findings as stated.     In emergency, the nursing units have been notified to contact the perfusion department either by:  Calling [id number] from 630am to 4pm Mon thru Fri, utilizing the On-Call Finder functionality of Epic and searching for Perfusion, or by contacting the hospital  from 4pm to 630am and on weekends and asking to speak with the perfusionist on call.    11:30 AM

## 2024-07-01 NOTE — TREATMENT PLAN
Potential transfer to [facility] to day at [facility]  Waiting to hear offical acceptance and transport times    Patient eating on his own--No TF at this time  Patient and CG report multiple LVAD educated family members who can transport and stay with the paitent while at HD sessions OP    Patient to transfer back to [facility] [place] [place] if no OP HD chair is assigned at time of discharge from rehab  Started referrals to not be behind if patient returns, can work with rehab personal to get updated info to OP HD centers if needed    Sharon asked about if FT is present---

## 2024-07-01 NOTE — PLAN OF CARE
[facility]     Department of Hospital Medicine     [address] [place] [address]     [phone] after hours  [phone] fax                                        FACILITY TRANSFER ORDERS     Patient Name: Radha Abbott  YOB: 1960    07/01/2024    Admit to:  Inpatient rehab     Diagnoses:  Active Hospital Problems    Diagnosis  POA    *Acute renal failure superimposed on stage 4 chronic kidney disease [N17.9, N18.4]  Yes     Priority: 1 - High    LVAD (left ventricular assist device) present [Z95.811]  Not Applicable     Priority: 2     Acute on chronic combined systolic and diastolic heart failure [I50.43]  Yes     Priority: 3     PAF (paroxysmal atrial fibrillation) [I48.0]  Yes     Priority: 4     Anticoagulant long-term use [Z79.01]  Not Applicable     Priority: 7     CAD (coronary artery disease) [I25.10]  Yes     Priority: 8      Continue home medications        Anemia [D64.9]  Yes     Priority: 9     Type 2 diabetes mellitus without complication, without long-term current use of insulin [E11.9]  Yes     Priority: 9     Dialysis patient [Z99.2]  Not Applicable    Chronic kidney disease-mineral and bone disorder [N18.9, E83.9, M89.9]  Yes    Renal failure syndrome [N19]  Yes    Acute on chronic combined systolic (congestive) and diastolic (congestive) heart failure [I50.43]  Yes    Bacteremia due to Enterococcus [R78.81, B95.2]  Yes    CKD (chronic kidney disease) stage V requiring chronic dialysis [N18.6, Z99.2]  Not Applicable    Impaired mobility [Z74.09]  Yes    Adjustment disorder with depressed mood [F43.21]  Yes      Resolved Hospital Problems    Diagnosis Date Resolved POA    Aspiration pneumonia of both lungs [J69.0] 06/26/2024 Unknown    Delirium [R41.0] 06/24/2024 No    Hypoglycemia [E16.2] 06/19/2024 No       Vital Signs: Routine.    Allergies:Review of patient's allergies indicates:  No Known Allergies    Diet: cardiac diet and  renal diet     Acitivities: activity as tolerated    Nursing: Per unit protocol     Labs:BMP, INR, CBC daily for 3 days    CONSULTS:        Physical Therapy to evaluate and treat     Occupational Therapy to evaluate and treat     Speech Therapy  to evaluate and treat    MISCELLANEOUS CARE:       Hampton Care: Empty Hampton bag every shift.  Change Hampton every month     Routine Skin for Bedridden Patients:  Apply moisture barrier cream to all    skin folds and wet areas in perineal area daily and after baths and                           all bowel movements.        DIABETES CARE: ucate Diabetic management with blood glucose monitoring:        Fingerstick blood sugar AC and HS      Report CBG < 60 or > 350 to physician.                                          Insulin Sliding Scale          Glucose  Novolog Insulin Subcutaneous        0 - 60   Orange juice or glucose tablet, hold insulin      No insulin   201-250  2 units   251-300  4 units   301-350  6 units   351-400  8 units   >400   10 units then call physician    Medications:      Medication List        START taking these medications      insulin aspart U-100 100 unit/mL (3 mL) Inpn pen  Commonly known as: NovoLOG  Inject 0-10 Units into the skin before meals and at bedtime as needed (Hyperglycemia).     midodrine 10 MG tablet  Commonly known as: PROAMATINE  Take 1 tablet (10 mg total) by mouth every 8 (eight) hours.     pantoprazole 40 MG tablet  Commonly known as: PROTONIX  Take 1 tablet (40 mg total) by mouth once daily.  Start taking on: July 2, 2024     sevelamer carbonate 0.8 gram Pwpk  Commonly known as: RENVELA  Take 1 packet (0.8 g total) by mouth 3 (three) times daily with meals.     sodium bicarbonate 650 MG tablet  Take 1 tablet (650 mg total) by mouth 3 (three) times daily.            CHANGE how you take these medications      amoxicillin 500 MG capsule  Commonly known as: AMOXIL  Take 1 capsule (500 mg total) by mouth once daily.  What changed:    how much to take  when to take this            CONTINUE taking these medications      acetaminophen 500 MG tablet  Commonly known as: TYLENOL  Take 2 tablets (1,000 mg total) by mouth every 8 (eight) hours as needed for Pain.     amiodarone 400 MG tablet  Commonly known as: PACERONE  Take 1 tablet (400 mg total) by mouth once daily.     atorvastatin 40 MG tablet  Commonly known as: LIPITOR  Take 1 tablet (40 mg total) by mouth every evening.     gabapentin 100 MG capsule  Commonly known as: NEURONTIN  Take 1 capsule (100 mg total) by mouth 2 (two) times daily.     omega-3 acid ethyl esters 1 gram capsule  Commonly known as: LOVAZA  Take 2 capsules (2 g total) by mouth 2 (two) times daily.     pulse oximeter device  Commonly known as: pulse oximeter  by Apply Externally route 2 (two) times a day. Use twice daily at 8 AM and 3 PM and [facility].     senna-docusate 8.6-50 mg 8.6-50 mg per tablet  Commonly known as: PERICOLACE  Take 2 tablets by mouth daily as needed for Constipation.     traZODone 50 MG tablet  Commonly known as: DESYREL  Take 0.5 tablets (25 mg total) by mouth every evening.     venlafaxine 37.5 MG Tab  Commonly known as: EFFEXOR  Take 1 tablet (37.5 mg total) by mouth once daily.     vitamin D 1000 units Tab  Commonly known as: VITAMIN D3  Take 1 tablet (1,000 Units total) by mouth once daily.     warfarin 2.5 MG tablet  Commonly known as: COUMADIN  Take 1 tablet (2.5 mg total) by mouth Daily.            STOP taking these medications      bumetanide 2 MG tablet  Commonly known as: BUMEX     famotidine 20 MG tablet  Commonly known as: PEPCID     fluticasone propionate 50 mcg/actuation nasal spray  Commonly known as: FLONASE     folic acid 1 MG tablet  Commonly known as: FOLVITE     glimepiride 1 MG tablet  Commonly known as: AMARYL     hydrALAZINE 100 MG tablet  Commonly known as: APRESOLINE     magnesium oxide 400 mg (241.3 mg magnesium) tablet  Commonly known as:  MAG-OX     metOLazone 10 MG tablet  Commonly known as: ZAROXOLYN               Where to Get Your Medications        Information about where to get these medications is not yet available    Ask your nurse or doctor about these medications  amoxicillin 500 MG capsule  insulin aspart U-100 100 unit/mL (3 mL) Inpn pen  midodrine 10 MG tablet  pantoprazole 40 MG tablet  sevelamer carbonate 0.8 gram Pwpk  sodium bicarbonate 650 MG tablet                _________________________________  DENIZ Faye  07/01/2024

## 2024-07-01 NOTE — SUBJECTIVE & OBJECTIVE
"Interval HPI:   No acute events overnight. Patient in room 319/319 A. Blood glucose stable. BG at goal on current insulin regimen (SSI ). Steroid use- None .   Renal function- Abnormal -   Vasopressors-  None      Diet Adult Regular Fluid - 1500mL      Eatin%  Nausea: No  Hypoglycemia and intervention: No  Fever: No  TPN and/or TF: No       BP (!) 74/0   Pulse 78   Temp 98.1 °F (36.7 °C) (Oral)   Resp 18   Ht 6' 1" (1.854 m)   Wt 73.1 kg (161 lb 2.5 oz)   SpO2 97%   BMI 21.26 kg/m²     Labs Reviewed and Include    Recent Labs   Lab 24  0526   GLU 75   CALCIUM 8.7   ALBUMIN 2.1*   PROT 7.1      K 4.0   CO2 25      BUN 23   CREATININE 3.4*   ALKPHOS 176*   ALT 47*   AST 61*   BILITOT 0.4     Lab Results   Component Value Date    WBC 6.88 2024    HGB 8.3 (L) 2024    HCT 26.3 (L) 2024    MCV 90 2024     (H) 2024     No results for input(s): "TSH", "FREET4" in the last 168 hours.  Lab Results   Component Value Date    HGBA1C 7.4 (H) 2024       Nutritional status:   Body mass index is 21.26 kg/m².  Lab Results   Component Value Date    ALBUMIN 2.1 (L) 2024    ALBUMIN 1.9 (L) 2024    ALBUMIN 2.0 (L) 2024     Lab Results   Component Value Date    PREALBUMIN 19 (L) 2024    PREALBUMIN 18 (L) 2024    PREALBUMIN 17 (L) 2024       Estimated Creatinine Clearance: 23.3 mL/min (A) (based on SCr of 3.4 mg/dL (H)).    Accu-Checks  Recent Labs     24  1713 24  0818 24  1253 24  1718 24  0745 24  1231 24  0741   POCTGLUCOSE 165* 121* 153* 183* 167* 81 181* 164* 136* 88       Current Medications and/or Treatments Impacting Glycemic Control  Immunotherapy:    Immunosuppressants       None          Steroids:   Hormones (From admission, onward)      None          Pressors:    Autonomic Drugs (From admission, onward)      Start     Stop Route " Frequency Ordered    06/19/24 1415  midodrine tablet 10 mg         -- Oral Every 8 hours 06/19/24 1414          Hyperglycemia/Diabetes Medications:   Antihyperglycemics (From admission, onward)      Start     Stop Route Frequency Ordered    06/23/24 0729  insulin aspart U-100 pen 0-10 Units         -- SubQ Before meals & nightly PRN 06/23/24 0616

## 2024-07-01 NOTE — PROGRESS NOTES
[facility] - Cardiology Stepdown  Heart Transplant  Progress Note    Patient Name: Radha Abbott  MRN: [id number]  Admission Date: 6/11/2024  Hospital Length of Stay: 20 days  Attending Physician: No att. providers found  Primary Care Provider: Vasu Kong MD  Principal Problem:Acute renal failure superimposed on stage 4 chronic kidney disease    Subjective:   Interval History: No acute events overnight. He had HD overnight and 1L of fluid removed.  He is eager to go to rehab which is planned for transfer today.     Continuous Infusions:  Scheduled Meds:   amiodarone  400 mg Oral Daily    amoxicillin  500 mg Per NG tube Daily    atorvastatin  40 mg Oral QHS    cyanocobalamin  250 mcg Oral Daily    epoetin zohaib (PROCRIT) injection  10,000 Units Intravenous Every Mon, Wed, Fri    folic acid  1 mg Oral Daily    gabapentin  100 mg Oral BID    LIDOcaine  2 patch Transdermal Q24H    midodrine  10 mg Oral Q8H    pantoprazole  40 mg Oral Daily    QUEtiapine  100 mg Oral QHS    sevelamer carbonate  0.8 g Oral TID WM    venlafaxine  37.5 mg Oral Daily    vitamin D  1,000 Units Oral Daily    warfarin  2.5 mg Oral Daily     PRN Meds:  Current Facility-Administered Medications:     0.9%  NaCl infusion (for blood administration), , Intravenous, Q24H PRN    0.9% NaCl, , Intravenous, PRN    acetaminophen, 650 mg, Oral, Q6H PRN    acetaminophen, 650 mg, Per NG tube, Q6H PRN    dextrose 10%, 12.5 g, Intravenous, PRN    dextrose 10%, 12.5 g, Intravenous, PRN    dextrose 10%, 25 g, Intravenous, PRN    dextrose 10%, 25 g, Intravenous, PRN    glucagon (human recombinant), 1 mg, Intramuscular, PRN    glucose, 16 g, Oral, PRN    glucose, 24 g, Oral, PRN    insulin aspart U-100, 0-10 Units, Subcutaneous, QID (AC + HS) PRN    simethicone, 1 tablet, Oral, TID PRN    traZODone, 25 mg, Oral, Nightly PRN    Review of patient's allergies indicates:  No Known Allergies  Objective:     Vital Signs (Most Recent):  Temp: 97.8 °F (36.6 °C)  (07/01/24 1104)  Pulse: 90 (07/01/24 1105)  Resp: 18 (07/01/24 1104)  BP: (!) 74/0 (07/01/24 0745)  SpO2: 98 % (07/01/24 1104) Vital Signs (24h Range):  Temp:  [97.5 °F (36.4 °C)-98.7 °F (37.1 °C)] 97.8 °F (36.6 °C)  Pulse:  [78-93] 90  Resp:  [16-18] 18  SpO2:  [97 %-99 %] 98 %  BP: ()/(0-93) 74/0     Patient Vitals for the past 72 hrs (Last 3 readings):   Weight   07/01/24 0515 73.1 kg (161 lb 2.5 oz)   06/30/24 0500 72.5 kg (159 lb 13.3 oz)     Body mass index is 21.26 kg/m².      Intake/Output Summary (Last 24 hours) at 7/1/2024 1255  Last data filed at 7/1/2024 0900  Gross per 24 hour   Intake 600 ml   Output 1600 ml   Net -1000 ml              Physical Exam  Constitutional:       Appearance: Normal appearance.   Cardiovascular:      Comments: JVP at clavicle; VAD hum  Pulmonary:      Effort: Pulmonary effort is normal.      Breath sounds: Normal breath sounds.   Musculoskeletal:      Right lower leg: No edema.      Left lower leg: No edema.   Skin:     General: Skin is warm and dry.   Neurological:      Mental Status: He is alert.            Significant Labs:  CBC:  Recent Labs   Lab 06/26/24  0500 06/27/24  0428 07/01/24  0526   WBC 7.52 6.88 7.11   RBC 2.72* 2.93* 3.09*   HGB 7.5* 8.3* 8.5*   HCT 24.0* 26.3* 28.9*   * 475* 491*   MCV 88 90 94   MCH 27.6 28.3 27.5   MCHC 31.3* 31.6* 29.4*     BNP:  Recent Labs   Lab 06/26/24  0500 06/28/24  0406 07/01/24  0525   * 1,736* 805*     CMP:  Recent Labs   Lab 06/26/24  0500 06/27/24  0427 06/28/24  0406 06/30/24  0341 07/01/24  0526   GLU 93 133*  --  73 75   CALCIUM 8.4* 8.7  --  8.5* 8.7   ALBUMIN 2.0*  --  1.9*  --  2.1*   PROT 7.3  --  6.8  --  7.1    138  --  135* 137   K 4.1 4.2  --  4.6 4.0   CO2 25 21*  --  25 25    102  --  101 101   BUN 42* 57*  --  40* 23   CREATININE 4.0* 5.8*  --  4.9* 3.4*   ALKPHOS 245*  --  195*  --  176*   ALT 55*  --  47*  --  47*   AST 64*  --  61*  --  61*   BILITOT 0.5  --  0.4  --  0.4     "  Coagulation:   Recent Labs   Lab 06/29/24 0448 06/30/24  0341 07/01/24  0525   INR 2.5* 2.4* 2.2*   APTT 43.6* 44.6* 44.1*     LDH:  Recent Labs   Lab 06/29/24 0448 06/30/24  0341 07/01/24  0526   * 273* 270*     Microbiology:  Microbiology Results (last 7 days)       ** No results found for the last 168 hours. **            BMP:   Recent Labs   Lab 07/01/24  0526   GLU 75      K 4.0      CO2 25   BUN 23   CREATININE 3.4*   CALCIUM 8.7   MG 2.1     Cardiac Markers: No results for input(s): "CKMB", "TROPONINT", "MYOGLOBIN" in the last 72 hours.  Coagulation:   Recent Labs   Lab 07/01/24  0525   INR 2.2*   APTT 44.1*     I have reviewed all pertinent labs within the past 24 hours.    Estimated Creatinine Clearance: 23.3 mL/min (A) (based on SCr of 3.4 mg/dL (H)).    Diagnostic Results:  I have reviewed and interpreted all pertinent imaging results/findings within the past 24 hours.  Assessment and Plan:     Mr. Radha Abbott is a 65 yo male with stage D HFrEF, ICMP who underwent HM3 placement as DT (12/1/23) w/ a hospital stay complicated by vasoplegia(requiring Giaprezza), debility, malnutrition/impaired swallowing, and renal failure requiring HD (since weaned off) w/ recent discharge for ADHF presented to the ED with wife due to c/o worsening generalized weakness since 6/10. Per wife patient was mostly sleeping during the day despite sleeping 10 hrs at night. Patient also admits decreased appetite and decreased urine output despite taking his medications as prescribed. He has not urinated since 6/11 at 2pm. He has chronic shortness of breath on exertion. Denies confusion, syncope, diarrhea, constipation, N/V, dysuria, or other complaints. Patient had his prior tunnel cath removed used for HD about 2 weeks ago. He completed a 6 week therapy for Enterococcus bacteremia with ampicillin and rocephin ending 5/30/24a and was switched to PO antibiotics.     On prior hospital stay nephrology consulted " during hospital stay due to elevated renal function. They feel he is close to being a dialysis patient. He diuresed on IV Bumex with prn Metolazone. He is net negative 1.2L throughout his hospital stay and weight on day of discharge was 179lbs down from 186lbs on admit. We continued his home dose of Bumex 4mg BID but increased his prn Metolazone to 10mg. He received a dose of Epo while inpatient and he has plans to follow up with Heme/Onc to get outpatient therapy arranged.      2D Echo with CFD done on 3/26/2024  LVAD: There is a Heartmate III LVAD running at 5000 RPM. The aortic valve does not open. The ventricular septum is at midline. Inflow cannula well seated at the apex. Outflow graft not visualized.   Left Ventricle: The left ventricle is moderately dilated. Normal wall thickness. Global hypokinesis present. Septal motion is abnormal. There is severely reduced systolic function with a visually estimated ejection fraction of 5 - 10%. There is diastolic dysfunction but grade cannot be determined.   Right Ventricle: Mild right ventricular enlargement. Wall thickness is normal. Right ventricle wall motion is normal. Systolic function is normal. Pacemaker lead present in the ventricle.   Left Atrium: Left atrium is severely dilated.   Right Atrium: Right atrium is moderately dilated.   Mitral Valve: The mitral valve is repaired with an Noble stitch. There is mild regurgitation.   IVC/SVC: Normal venous pressure at 3 mmHg.              * Acute renal failure superimposed on stage 4 chronic kidney disease  -Mr. 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 Admitted for worsening renal function with a BUN>100/Cr. 9.9. Currently oliguric.  - CT Ab/pel: Kidneys/ Ureters: Normal in size and location.  Mild bilateral perinephric stranding, a nonspecific finding and similar  to prior.  No hydronephrosis or nephrolithiasis. No ureteral dilatation.  - tunneled line placed 6/24 for HD   - HD per nephro   - Will need OP HD upon discharge, recommend CM/SW involvement to start workup.  - Renally dosing medications      LVAD (left ventricular assist device) present  -HeartMate 3 Implanted  12/1/2023  as DT  -Cont Coumadin, dosing by PharmD.  Goal INR 2.0-3.0. therapeutic today. Previous home dose was 2.5mg Qdaily  -Antiplatelets Not on ASA  -LDH is stable overall today. Will continue to monitor daily.  -Speed set at  5000     -Interrogation notable for no events  -Not listed for OHTx, declined for OHTX due to comorbidities     Procedure: Device Interrogation Including analysis of device parameters  Current Settings: Ventricular Assist Device  Review of device function is stable        6/30/2024    12:15 PM 6/30/2024     7:48 AM 6/30/2024     5:48 AM 6/30/2024     1:00 AM 6/29/2024     8:00 PM 6/29/2024     4:45 PM 6/29/2024    12:33 PM   TXP LVAD INTERROGATIONS   Type HeartMate3 HeartMate3 HeartMate3 HeartMate3 HeartMate3 HeartMate3 HeartMate3   Flow  4.1 4.1 4.2 4.2 3.9 4.1   Speed 5000 5000 5050 5000 5050 5000 5000   PI  2.7 3.8 3.2 4.4 4.8 4.6   Power ([place])  3.4 3.5 3.5 3.4 3.4 3.5   LSL 4600 4600 4600 4600 4600 4600 4600   Pulsatility  Intermittent pulse Intermittent pulse Intermittent pulse Intermittent pulse           Acute on chronic combined systolic and diastolic heart failure  -Owing to his advanced kidney disease and significantly elevated creat, he is not on a  ACEI/ARB/ARNI or MRA. He is also not on a beta blocker due to RV dysfunction.   -ICM  -Last 2D Echo 6/15/24: LVEF 5-10%, LVEDD 6.28 cm with speed at 5000  -Euvolemic on examination today  -Volume removal with HD  -2g Na dietary restriction, 1500 mL fluid restriction, strict I/Os      PAF (paroxysmal atrial fibrillation)  -Continue amiodarone and Coumadin      Anticoagulant long-term use  -Cont warfarin    CAD (coronary  artery disease)  -Continue statin    Anemia  Chronic with no acute bleeding, multifactorial 2/2 CKD4 and chronic anticoagulation.  Low-normal Vitamin B12 and elevated MMA c/w Vitamin B12 deficiency.  -s/p Epo   -Replaced IV iron   -Vitamin B12 supplementation  -reduce lab frequency given stable CBC and electrolytes    Type 2 diabetes mellitus without complication, without long-term current use of insulin  -Endocrine    Bacteremia due to Enterococcus  He completed a 6 week therapy for Enterococcus bacteremia with ampicillin and rocephin ending 5/30/24a and was switched to Amoxicillin for suppression    Adjustment disorder with depressed mood  Cont SSRI        DENIZ Faye  Heart Transplant  [facility] - Cardiology Stepdown

## 2024-07-01 NOTE — SUBJECTIVE & OBJECTIVE
Interval History: NAEON. HD yesterday, - 1 L, patient tolerated well. Plan for possible dc today to [facility] pending official acceptance and transport.     Review of patient's allergies indicates:  No Known Allergies  Current Facility-Administered Medications   Medication Frequency    0.9%  NaCl infusion (for blood administration) Q24H PRN    0.9%  NaCl infusion PRN    acetaminophen tablet 650 mg Q6H PRN    acetaminophen tablet 650 mg Q6H PRN    amiodarone tablet 400 mg Daily    amoxicillin 400 mg/5 mL suspension 500 mg Daily    atorvastatin tablet 40 mg QHS    cyanocobalamin tablet 250 mcg Daily    dextrose 10% bolus 125 mL 125 mL PRN    dextrose 10% bolus 125 mL 125 mL PRN    dextrose 10% bolus 250 mL 250 mL PRN    dextrose 10% bolus 250 mL 250 mL PRN    epoetin zohaib injection 10,000 Units Every Mon, Wed, Fri    folic acid tablet 1 mg Daily    gabapentin capsule 100 mg BID    glucagon (human recombinant) injection 1 mg PRN    glucose chewable tablet 16 g PRN    glucose chewable tablet 24 g PRN    insulin aspart U-100 pen 0-10 Units QID (AC + HS) PRN    LIDOcaine 5 % patch 2 patch Q24H    midodrine tablet 10 mg Q8H    pantoprazole EC tablet 40 mg Daily    QUEtiapine tablet 100 mg QHS    sevelamer carbonate pwpk 0.8 g TID WM    simethicone chewable tablet 80 mg TID PRN    sodium bicarbonate tablet 650 mg TID    trazodone split tablet 25 mg Nightly PRN    venlafaxine tablet 37.5 mg Daily    vitamin D 1000 units tablet 1,000 Units Daily    warfarin (COUMADIN) tablet 2.5 mg Daily       Objective:     Vital Signs (Most Recent):  Temp: 97.8 °F (36.6 °C) (07/01/24 1104)  Pulse: 90 (07/01/24 1105)  Resp: 18 (07/01/24 1104)  BP: (!) 74/0 (07/01/24 0745)  SpO2: 98 % (07/01/24 1104) Vital Signs (24h Range):  Temp:  [97.5 °F (36.4 °C)-98.7 °F (37.1 °C)] 97.8 °F (36.6 °C)  Pulse:  [78-93] 90  Resp:  [16-18] 18  SpO2:  [97 %-99 %] 98 %  BP: ()/(0-93) 74/0     Weight: 73.1 kg (161 lb 2.5 oz) (07/01/24 0515)  Body mass index is  21.26 kg/m².  Body surface area is 1.94 meters squared.    I/O last 3 completed shifts:  In: 840 [P.O.:840]  Out: 1600 [Urine:100; Other:1500]     Physical Exam  Vitals reviewed.   Constitutional:       General: He is not in acute distress.  HENT:      Head: Normocephalic and atraumatic.   Eyes:      Conjunctiva/sclera: Conjunctivae normal.   Neck:      Vascular: No JVD.   Cardiovascular:      Rate and Rhythm: Normal rate.   Pulmonary:      Effort: Pulmonary effort is normal. No respiratory distress.   Musculoskeletal:      Right lower leg: No edema.      Left lower leg: No edema.   Neurological:      Mental Status: He is alert and oriented to person, place, and time.   Psychiatric:         Mood and Affect: Mood normal.          Significant Labs:  CBC:   Recent Labs   Lab 07/01/24  0526   WBC 7.11   RBC 3.09*   HGB 8.5*   HCT 28.9*   *   MCV 94   MCH 27.5   MCHC 29.4*     CMP:   Recent Labs   Lab 07/01/24  0526   GLU 75   CALCIUM 8.7   ALBUMIN 2.1*   PROT 7.1      K 4.0   CO2 25      BUN 23   CREATININE 3.4*   ALKPHOS 176*   ALT 47*   AST 61*   BILITOT 0.4     All labs within the past 24 hours have been reviewed.

## 2024-07-01 NOTE — SUBJECTIVE & OBJECTIVE
Interval History: No acute events overnight. He had HD overnight and 1L of fluid removed.  He is eager to go to rehab which is planned for transfer today.     Continuous Infusions:  Scheduled Meds:   amiodarone  400 mg Oral Daily    amoxicillin  500 mg Per NG tube Daily    atorvastatin  40 mg Oral QHS    cyanocobalamin  250 mcg Oral Daily    epoetin zohaib (PROCRIT) injection  10,000 Units Intravenous Every Mon, Wed, Fri    folic acid  1 mg Oral Daily    gabapentin  100 mg Oral BID    LIDOcaine  2 patch Transdermal Q24H    midodrine  10 mg Oral Q8H    pantoprazole  40 mg Oral Daily    QUEtiapine  100 mg Oral QHS    sevelamer carbonate  0.8 g Oral TID WM    venlafaxine  37.5 mg Oral Daily    vitamin D  1,000 Units Oral Daily    warfarin  2.5 mg Oral Daily     PRN Meds:  Current Facility-Administered Medications:     0.9%  NaCl infusion (for blood administration), , Intravenous, Q24H PRN    0.9% NaCl, , Intravenous, PRN    acetaminophen, 650 mg, Oral, Q6H PRN    acetaminophen, 650 mg, Per NG tube, Q6H PRN    dextrose 10%, 12.5 g, Intravenous, PRN    dextrose 10%, 12.5 g, Intravenous, PRN    dextrose 10%, 25 g, Intravenous, PRN    dextrose 10%, 25 g, Intravenous, PRN    glucagon (human recombinant), 1 mg, Intramuscular, PRN    glucose, 16 g, Oral, PRN    glucose, 24 g, Oral, PRN    insulin aspart U-100, 0-10 Units, Subcutaneous, QID (AC + HS) PRN    simethicone, 1 tablet, Oral, TID PRN    traZODone, 25 mg, Oral, Nightly PRN    Review of patient's allergies indicates:  No Known Allergies  Objective:     Vital Signs (Most Recent):  Temp: 97.8 °F (36.6 °C) (07/01/24 1104)  Pulse: 90 (07/01/24 1105)  Resp: 18 (07/01/24 1104)  BP: (!) 74/0 (07/01/24 0745)  SpO2: 98 % (07/01/24 1104) Vital Signs (24h Range):  Temp:  [97.5 °F (36.4 °C)-98.7 °F (37.1 °C)] 97.8 °F (36.6 °C)  Pulse:  [78-93] 90  Resp:  [16-18] 18  SpO2:  [97 %-99 %] 98 %  BP: ()/(0-93) 74/0     Patient Vitals for the past 72 hrs (Last 3 readings):   Weight    07/01/24 0515 73.1 kg (161 lb 2.5 oz)   06/30/24 0500 72.5 kg (159 lb 13.3 oz)     Body mass index is 21.26 kg/m².      Intake/Output Summary (Last 24 hours) at 7/1/2024 1255  Last data filed at 7/1/2024 0900  Gross per 24 hour   Intake 600 ml   Output 1600 ml   Net -1000 ml              Physical Exam  Constitutional:       Appearance: Normal appearance.   Cardiovascular:      Comments: JVP at clavicle; VAD hum  Pulmonary:      Effort: Pulmonary effort is normal.      Breath sounds: Normal breath sounds.   Musculoskeletal:      Right lower leg: No edema.      Left lower leg: No edema.   Skin:     General: Skin is warm and dry.   Neurological:      Mental Status: He is alert.            Significant Labs:  CBC:  Recent Labs   Lab 06/26/24  0500 06/27/24  0428 07/01/24  0526   WBC 7.52 6.88 7.11   RBC 2.72* 2.93* 3.09*   HGB 7.5* 8.3* 8.5*   HCT 24.0* 26.3* 28.9*   * 475* 491*   MCV 88 90 94   MCH 27.6 28.3 27.5   MCHC 31.3* 31.6* 29.4*     BNP:  Recent Labs   Lab 06/26/24  0500 06/28/24  0406 07/01/24  0525   * 1,736* 805*     CMP:  Recent Labs   Lab 06/26/24  0500 06/27/24  0427 06/28/24  0406 06/30/24  0341 07/01/24  0526   GLU 93 133*  --  73 75   CALCIUM 8.4* 8.7  --  8.5* 8.7   ALBUMIN 2.0*  --  1.9*  --  2.1*   PROT 7.3  --  6.8  --  7.1    138  --  135* 137   K 4.1 4.2  --  4.6 4.0   CO2 25 21*  --  25 25    102  --  101 101   BUN 42* 57*  --  40* 23   CREATININE 4.0* 5.8*  --  4.9* 3.4*   ALKPHOS 245*  --  195*  --  176*   ALT 55*  --  47*  --  47*   AST 64*  --  61*  --  61*   BILITOT 0.5  --  0.4  --  0.4      Coagulation:   Recent Labs   Lab 06/29/24 0448 06/30/24 0341 07/01/24  0525   INR 2.5* 2.4* 2.2*   APTT 43.6* 44.6* 44.1*     LDH:  Recent Labs   Lab 06/29/24 0448 06/30/24 0341 07/01/24  0526   * 273* 270*     Microbiology:  Microbiology Results (last 7 days)       ** No results found for the last 168 hours. **            BMP:   Recent Labs   Lab 07/01/24  0526  "  GLU 75      K 4.0      CO2 25   BUN 23   CREATININE 3.4*   CALCIUM 8.7   MG 2.1     Cardiac Markers: No results for input(s): "CKMB", "TROPONINT", "MYOGLOBIN" in the last 72 hours.  Coagulation:   Recent Labs   Lab 07/01/24  0525   INR 2.2*   APTT 44.1*     I have reviewed all pertinent labs within the past 24 hours.    Estimated Creatinine Clearance: 23.3 mL/min (A) (based on SCr of 3.4 mg/dL (H)).    Diagnostic Results:  I have reviewed and interpreted all pertinent imaging results/findings within the past 24 hours.  "

## 2024-07-01 NOTE — HOSPITAL COURSE
63 yo male with stage D HFrEF, ICMP who underwent HM3 placement as DT (12/1/23) admitted for renal failure requiring long term HD.  He has been doing great since volume was removed, he's been eating/sleeping normally, and his delirium resolved.  His hospital course was complicated by dispo given LVAD with HD, but he had been approved for rehab transfer.     Throughout his hosptial stay: patient is+1L but doing well and euvolemic.  His weight on day of discharge 161 lbs down from 179lbs on admission. Plan for 3x week HD as an outpatient

## 2024-07-01 NOTE — DISCHARGE SUMMARY
[facility] - Cardiology Stepdown  Heart Transplant  Discharge Summary      Patient Name: Radha Abbott  MRN: [id number]  Admission Date: 6/11/2024  Hospital Length of Stay: 20 days  Discharge Date and Time: 07/01/2024 1:02 PM  Attending Physician: Chiquita att. providers found   Discharging Provider: DENIZ Faye  Primary Care Provider: Vasu Kong MD     HPI: Mr. Radha Abbott is a 65 yo male with stage D HFrEF, ICMP who underwent HM3 placement as DT (12/1/23) w/ a hospital stay complicated by vasoplegia(requiring Giaprezza), debility, malnutrition/impaired swallowing, and renal failure requiring HD (since weaned off) w/ recent discharge for ADHF presented to the ED with wife due to c/o worsening generalized weakness since 6/10. Per wife patient was mostly sleeping during the day despite sleeping 10 hrs at night. Patient also admits decreased appetite and decreased urine output despite taking his medications as prescribed. He has not urinated since 6/11 at 2pm. He has chronic shortness of breath on exertion. Denies confusion, syncope, diarrhea, constipation, N/V, dysuria, or other complaints. Patient had his prior tunnel cath removed used for HD about 2 weeks ago. He completed a 6 week therapy for Enterococcus bacteremia with ampicillin and rocephin ending 5/30/24a and was switched to PO antibiotics.     On prior hospital stay nephrology consulted during hospital stay due to elevated renal function. They feel he is close to being a dialysis patient. He diuresed on IV Bumex with prn Metolazone. He is net negative 1.2L throughout his hospital stay and weight on day of discharge was 179lbs down from 186lbs on admit. We continued his home dose of Bumex 4mg BID but increased his prn Metolazone to 10mg. He received a dose of Epo while inpatient and he has plans to follow up with Heme/Onc to get outpatient therapy arranged.      2D Echo with CFD done on 3/26/2024  LVAD: There is a Heartmate III LVAD running at 5000  RPM. The aortic valve does not open. The ventricular septum is at midline. Inflow cannula well seated at the apex. Outflow graft not visualized.   Left Ventricle: The left ventricle is moderately dilated. Normal wall thickness. Global hypokinesis present. Septal motion is abnormal. There is severely reduced systolic function with a visually estimated ejection fraction of 5 - 10%. There is diastolic dysfunction but grade cannot be determined.   Right Ventricle: Mild right ventricular enlargement. Wall thickness is normal. Right ventricle wall motion is normal. Systolic function is normal. Pacemaker lead present in the ventricle.   Left Atrium: Left atrium is severely dilated.   Right Atrium: Right atrium is moderately dilated.   Mitral Valve: The mitral valve is repaired with an Noble stitch. There is mild regurgitation.   IVC/SVC: Normal venous pressure at 3 mmHg.              * No surgery found *     Hospital Course: 65 yo male with stage D HFrEF, ICMP who underwent HM3 placement as DT (12/1/23) admitted for renal failure requiring long term HD.  He has been doing great since volume was removed, he's been eating/sleeping normally, and his delirium resolved.  His hospital course was complicated by dispo given LVAD with HD, but he had been approved for rehab transfer.     Throughout his hosptial stay: patient is+1L but doing well and euvolemic.  His weight on day of discharge 161 lbs down from 179lbs on admission. Plan for 3x week HD as an outpatient     Goals of Care Treatment Preferences:  Code Status: Full Code    Health care agent: Pt's wife, Arlyn is NOK  Health care agent number: No value filed.                   Consults (From admission, onward)          Status Ordering Provider     Inpatient consult to Midline team  Once        Provider:  (Not yet assigned)    Completed CLAUDIA VERMA JR     Inpatient consult to Physical Medicine Rehab  Once        Provider:  (Not yet assigned)    Completed JOLENE  NORBERTO RORDIGUEZ     Inpatient consult to Midline team  Once        Provider:  (Not yet assigned)    Completed NORBERTO WEISS     Inpatient consult to Interventional Radiology  Once        Provider:  (Not yet assigned)    Completed ASIM BERMAN     Inpatient consult to Registered Dietitian/Nutritionist  Once        Provider:  (Not yet assigned)    Completed REBEKAH ZAZUETA     Inpatient consult to Interventional Radiology  Once        Provider:  (Not yet assigned)    Completed ADRI BEST     Inpatient consult to Endocrinology  Once        Provider:  (Not yet assigned)    Completed HANY LAWRENCE     Inpatient consult to Nephrology  Once        Provider:  (Not yet assigned)    Completed HANY LAWRENCE            Significant Diagnostic Studies: See chart for full details  Echo 6/14/24    Pending Diagnostic Studies:       None          Final Active Diagnoses:    Diagnosis Date Noted POA    PRINCIPAL PROBLEM:  Acute renal failure superimposed on stage 4 chronic kidney disease [N17.9, N18.4] 05/29/2024 Yes    LVAD (left ventricular assist device) present [Z95.811] 12/01/2023 Not Applicable    Acute on chronic combined systolic and diastolic heart failure [I50.43] 10/07/2023 Yes    PAF (paroxysmal atrial fibrillation) [I48.0] 10/11/2023 Yes    Anticoagulant long-term use [Z79.01] 05/28/2024 Not Applicable    CAD (coronary artery disease) [I25.10] 10/07/2023 Yes    Anemia [D64.9] 04/26/2024 Yes    Type 2 diabetes mellitus without complication, without long-term current use of insulin [E11.9] 10/07/2023 Yes    Dialysis patient [Z99.2] 06/27/2024 Not Applicable    Chronic kidney disease-mineral and bone disorder [N18.9, E83.9, M89.9] 06/25/2024 Yes    Renal failure syndrome [N19] 06/12/2024 Yes    Acute on chronic combined systolic (congestive) and diastolic (congestive) heart failure [I50.43] 06/12/2024 Yes    Bacteremia due to Enterococcus [R78.81, B95.2] 05/02/2024 Yes    CKD (chronic  kidney disease) stage V requiring chronic dialysis [N18.6, Z99.2] 01/22/2024 Not Applicable    Impaired mobility [Z74.09] 01/18/2024 Yes    Adjustment disorder with depressed mood [F43.21] 01/02/2024 Yes      Problems Resolved During this Admission:    Diagnosis Date Noted Date Resolved POA    Aspiration pneumonia of both lungs [J69.0] 06/17/2024 06/26/2024 Unknown    Delirium [R41.0] 06/16/2024 06/24/2024 No    Hypoglycemia [E16.2] 06/12/2024 06/19/2024 No      Discharged Condition: stable    Disposition: Rehab Facility    Follow Up:    Patient Instructions:      Diet Cardiac     Diet renal     Notify your health care provider if you experience any of the following:  severe uncontrolled pain     Notify your health care provider if you experience any of the following:  difficulty breathing or increased cough     Activity as tolerated     Medications:  Reconciled Home Medications:      Medication List        START taking these medications      insulin aspart U-100 100 unit/mL (3 mL) Inpn pen  Commonly known as: NovoLOG  Inject 0-10 Units into the skin before meals and at bedtime as needed (Hyperglycemia).     midodrine 10 MG tablet  Commonly known as: PROAMATINE  Take 1 tablet (10 mg total) by mouth every 8 (eight) hours.     pantoprazole 40 MG tablet  Commonly known as: PROTONIX  Take 1 tablet (40 mg total) by mouth once daily.  Start taking on: July 2, 2024     sevelamer carbonate 0.8 gram Pwpk  Commonly known as: RENVELA  Take 1 packet (0.8 g total) by mouth 3 (three) times daily with meals.     sodium bicarbonate 650 MG tablet  Take 1 tablet (650 mg total) by mouth 3 (three) times daily.            CHANGE how you take these medications      amoxicillin 500 MG capsule  Commonly known as: AMOXIL  Take 1 capsule (500 mg total) by mouth once daily.  What changed:   how much to take  when to take this            CONTINUE taking these medications      acetaminophen 500 MG tablet  Commonly known as: TYLENOL  Take 2  tablets (1,000 mg total) by mouth every 8 (eight) hours as needed for Pain.     amiodarone 400 MG tablet  Commonly known as: PACERONE  Take 1 tablet (400 mg total) by mouth once daily.     atorvastatin 40 MG tablet  Commonly known as: LIPITOR  Take 1 tablet (40 mg total) by mouth every evening.     gabapentin 100 MG capsule  Commonly known as: NEURONTIN  Take 1 capsule (100 mg total) by mouth 2 (two) times daily.     omega-3 acid ethyl esters 1 gram capsule  Commonly known as: LOVAZA  Take 2 capsules (2 g total) by mouth 2 (two) times daily.     pulse oximeter device  Commonly known as: pulse oximeter  by Apply Externally route 2 (two) times a day. Use twice daily at 8 AM and 3 PM and [facility].     senna-docusate 8.6-50 mg 8.6-50 mg per tablet  Commonly known as: PERICOLACE  Take 2 tablets by mouth daily as needed for Constipation.     traZODone 50 MG tablet  Commonly known as: DESYREL  Take 0.5 tablets (25 mg total) by mouth every evening.     venlafaxine 37.5 MG Tab  Commonly known as: EFFEXOR  Take 1 tablet (37.5 mg total) by mouth once daily.     vitamin D 1000 units Tab  Commonly known as: VITAMIN D3  Take 1 tablet (1,000 Units total) by mouth once daily.     warfarin 2.5 MG tablet  Commonly known as: COUMADIN  Take 1 tablet (2.5 mg total) by mouth Daily.            STOP taking these medications      bumetanide 2 MG tablet  Commonly known as: BUMEX     famotidine 20 MG tablet  Commonly known as: PEPCID     fluticasone propionate 50 mcg/actuation nasal spray  Commonly known as: FLONASE     folic acid 1 MG tablet  Commonly known as: FOLVITE     glimepiride 1 MG tablet  Commonly known as: AMARYL     hydrALAZINE 100 MG tablet  Commonly known as: APRESOLINE     magnesium oxide 400 mg (241.3 mg magnesium) tablet  Commonly known as: MAG-OX     metOLazone 10 MG tablet  Commonly known as: DENIZ Braxton  Heart Transplant  [facility]

## 2024-07-01 NOTE — PROGRESS NOTES
Discharge    Pt presents in room aaox4 with open affect. Caregiver not present at this time. Pt voices agreement with plan to discharge to [facility] today. Pt states he is looking forward to getting his legs stronger. Pt will be transported to rehab. Pt reports coping well and denies further needs, questions, concerns at this time and none indicated. Providing psychosocial and counseling support, education, resources, assistance and discharge planning as indicated. SW remains available.

## 2024-07-01 NOTE — PLAN OF CARE
Pt free of falls and injury. Pt denies any pain or discomfort. Pt AAOx4. Fall precautions remain in place. LVAD hum present and smooth. VAD numbers and dopplers WDL, communicated with HTS regarding midodrine administration, MD stated he would have day shift reevaluate medication and to hold dose.. DLES dressing CDI. Plan of care reviewed with pt.

## 2024-07-01 NOTE — PROGRESS NOTES
3hr bedside HD treatment completed 1L of fluid removed pt tolerated well. Report given to primary nurse at bedside.

## 2024-07-01 NOTE — PT/OT/SLP PROGRESS
Physical Therapy Co-Treatment    Patient Name:  Radha Abbott   MRN:  [id number]  Admitting Diagnosis:  Acute renal failure superimposed on stage 4 chronic kidney disease   Recent Surgery: * No surgery found *    Admit Date: 6/11/2024  Length of Stay: 20 days    Recommendations:     Discharge Recommendations:  High Intensity Therapy     Discharge Equipment Recommendations: wheelchair   Barriers to discharge:  increased need for skilled assistance    Appropriate transfer level with nursing staff: Ambulatory in room with RW with CGA    Plan:     During this hospitalization, patient to be seen 4 x/week to address the identified rehab impairments via gait training, therapeutic activities, therapeutic exercises, neuromuscular re-education and progress towards the established goals.  Plan of Care Expires:  07/13/24  Plan of Care Reviewed with: patient    Assessment:     Radha Abbott is a 64 y.o. male admitted with a medical diagnosis of Acute renal failure superimposed on stage 4 chronic kidney disease. Pt presents alert and upright in bed agreeable and motivated for therapy session. Pt demo'd improvements with sit to stand transfers as he required decreased assistance and increase distance gait trained with less assistance required this session. Pt continues to be limited by debility and decreased endurance from hospitalization requiring CGA-min A for all mobility. Patient would benefit from skilled therapy services to maximize safety and independence, increase activity tolerance, decrease fall risk, decrease caregiver burden, improve QOL, improve patient's functional mobility, and decrease risk of contractures and pressure sores. Patient has demonstrated sufficient progression to warrant high intensity therapy evidenced by objectives noted below.    Problem List: weakness, impaired endurance, impaired self care skills, impaired functional mobility, gait instability, impaired balance, decreased lower extremity  "function.  Rehab Prognosis: Good; patient would benefit from acute skilled PT services to address these deficits and reach maximum level of function.      Goals:   Multidisciplinary Problems       Physical Therapy Goals          Problem: Physical Therapy    Goal Priority Disciplines Outcome Goal Variances Interventions   Physical Therapy Goal     PT, PT/OT Progressing     Description: Goals to be completed by: 7/14/24    1.Pt will perform sup<>sit transfers w/ contact guard assistance  2.Pt will have sufficient dynamic balance to sit EOB while performing ADLs/therex w/ supervision  3.Pt will be able to stand up from EOB w/ contact guard assistance using LRAD  4.Pt will ambulate 100 feet w/ minimum assistance using LRAD  5.Pt will be independent w/ HEP therex on BLE w/ good form and ROM                        Subjective     RN notified prior to session. No one present upon PT entrance into room. Patient agreeable to PT treatment session.    Chief Complaint: "I want to walk"  Patient/Family Comments/goals: increase mobility   Pain/Comfort:  Pain Rating 1: 0/10  Pain Rating Post-Intervention 1: 0/10      Objective:     Additional staff present: OT; OT for cotx due to pt's multiple medical comorbidities and functional deficits req'ing two skilled therapists to appropriately maximize functional potential by progressing pt's musculoskeletal strength and endurance, facilitating neuromuscular postural balance and control ,and accommodating for patient's impaired cardiopulmonary tolerance to activities.     Patient found HOB elevated with: telemetry, LVAD   Cognition:   Alert and Cooperative  Patient is oriented to Person, Place, Time, Situation  General Precautions: Standard, Cardiac fall, LVAD   Orthopedic Precautions:N/A   Braces: AFO (R AFO used for mobility)   Body mass index is 21.26 kg/m².  Oxygen Device: Room Air  Vitals: BP (!) 74/0   Pulse 90   Temp 97.8 °F (36.6 °C) (Oral)   Resp 18   Ht 6' 1" (1.854 m)   Wt " 73.1 kg (161 lb 2.5 oz)   SpO2 98%   BMI 21.26 kg/m²     Outcome Measures:  AM-PAC 6 CLICK MOBILITY  Turning over in bed (including adjusting bedclothes, sheets and blankets)?: 3  Sitting down on and standing up from a chair with arms (e.g., wheelchair, bedside commode, etc.): 3  Moving from lying on back to sitting on the side of the bed?: 3  Moving to and from a bed to a chair (including a wheelchair)?: 3  Need to walk in hospital room?: 3  Climbing 3-5 steps with a railing?: 2  Basic Mobility Total Score: 17     Functional Mobility:    Bed Mobility:   Scooting with stand by assistance  Supine > Sit with stand by assistance    Transfers:   Sit <> Stand Transfer: minimum assistance with rolling walker from EOB and wheelchair  Bed <> Chair Transfer: Step Transfer technique with minimum assistance with no assistive device    Balance:  Sitting Balance  Static: supervision  Dynamic: supervision  Standing:  Static: contact guard assistance  Dynamic: contact guard assistance      Gait:  Patient ambulated: 36' with 1 brief standing rest break   Patient required: contact guard  Patient used:  rolling walker   Gait Deviation(s): occasional unsteady gait, decreased step length, narrow base of support, decreased foot clearance, flexed posture, and decreased gina  all lines remained intact throughout ambulation trial  Chair follow for patient safety  LVAD: personal shirt used on battery power   Comments: Patient required cues for upright stance, fwd gaze, increase AURORA.    Education:  Time provided for education, counseling and discussion of health disposition in regards to patient's current status  All questions answered within PT scope of practice and to patient's satisfaction  PT role in POC to address current functional deficits  Call nursing/pct to transfer to chair/use bathroom. Pt stated understanding.  LVAD: patient found on wall power / returned on battery power. No alarms sounded.   LVAD: Pt able to  switch wall  <> battery source with set up assistance from therapist.    Patient left  sitting in wheelchair  with all lines intact and call button in reach.    Time Tracking:     PT Received On: 07/01/24  PT Start Time: 1036     PT Stop Time: 1101  PT Total Time (min): 25 min     Billable Minutes:   Gait Training 15 minutes and Therapeutic Activity 10 minutes       PT/PTA: PT       7/1/2024

## 2024-07-01 NOTE — PROGRESS NOTES
Report received from primary nurse. Bedside dialysis started via [facility] cvc BFR@400. Family at bedside.

## 2024-07-01 NOTE — PROGRESS NOTES
Rounded on patient at bedside. Patient is A&Ox 4.  LVAD history interrogated with no abnormal events noted.  LVAD numbers WNL compared to baseline. Pt denies any needs at this time.  Patient given 6 silver kits for dressing supplies at rehab for discharge. Patient issued 1 loaner [facility] and MPU ([facility]- [id number]/ MPU: [id number]). Reviewed with bedside RN. Encouraged pt to notify nurse if they have any questions, problems or concerns for LVAD coordinator.  Pt verbalized understanding and in agreement of plan. Plan for team to continue to round on patient.

## 2024-07-01 NOTE — PROCEDURES
Interrogation of Ventricular assist device was performed with physician analysis of device parameters and review of device function. I have personally reviewed the interrogation findings and agree with findings as stated.   Procedure: Device Interrogation Including analysis of device parameters  Current Settings: Ventricular Assist Device  Review of device function is stable      7/1/2024     7:45 AM 7/1/2024     4:16 AM 6/30/2024    11:35 PM 6/30/2024     8:13 PM 6/30/2024     4:48 PM 6/30/2024     2:58 PM 6/30/2024    12:15 PM   TXP LVAD INTERROGATIONS   Type HeartMate3 HeartMate3 HeartMate3 HeartMate3 HeartMate3 HeartMate3 HeartMate3   Flow 3.9 4.3 4 3.7 3.5 4.4    Speed 5000 5050 5000 5050 5000 5000 5000   PI 4.7 3.7 4 6 6.6 4.1    Power ([place]) 3.4 3.4 3.4 3.4 3.4 3.4    LSL 4600 4600 4600 4600 4600 4600 4600   Low Flow Alarm  none none none      High Power Alarm  none none none      Pulsatility Intermittent pulse Pulse Pulse Intermittent pulse         Brayan Ocampo MD

## 2024-07-01 NOTE — NURSING
Patient is picked up now. Power cable No.[id number] and  no. UBC. [id number], 6 week supplies and  emergency kit sent with the patient.

## 2024-07-05 ENCOUNTER — TELEPHONE (OUTPATIENT)
Dept: TRANSPLANT | Facility: CLINIC | Age: 64
End: 2024-07-05
Payer: MEDICAID

## 2024-07-05 NOTE — TELEPHONE ENCOUNTER
Teresita reports patient doppler today is 68/0, previously has been around 80/0, patient is asymptomatic. Labs are not significantly different from time of d/c. Last HD session on Wednesday 500ml fluid removed. Patient has not missed any doses of midodrine. Discussed with Dr Ocampo, no interventions at this time. Notify VAD team if patient develops symptoms or if pressure drops below 65/0.

## 2024-07-08 NOTE — TREATMENT PLAN
[place] [facility] [place]  I asked her to continue working on the 4-8pm Chair at [facility]      I spoke with Mr. Abbott's spouse (Arlyn) and explained we need both companies to work on chair placement simultaneously until we get a CONFIRMED chair  She was agreeable to the 4-8pm chair if that's what is available  Her preference would be 6-10pm  with Sharon--which Sharon is also aware of that preference but not moving forward with the referral till stronger at rehab    [facility]

## 2024-07-11 ENCOUNTER — DOCUMENTATION ONLY (OUTPATIENT)
Dept: TRANSPLANT | Facility: HOSPITAL | Age: 64
End: 2024-07-11
Payer: MEDICAID

## 2024-07-11 NOTE — PROGRESS NOTES
[facility]   Heart Transplant/VAD Clinic   [address] [place] [address]   [phone] after hours          [phone] fax     VAD HOME  HEALTH ORDERS      Admit to Home Health    Diagnosis:   Patient Active Problem List   Diagnosis    Ventricular tachycardia    CAD (coronary artery disease)    Acute on chronic combined systolic and diastolic heart failure    Type 2 diabetes mellitus without complication, without long-term current use of insulin    PAF (paroxysmal atrial fibrillation)    Ventricular fibrillation    Acute on chronic combined systolic and diastolic CHF, NYHA class 4    Defibrillator discharge    Palliative care encounter    Advance care planning    LVAD (left ventricular assist device) present    Congestive heart failure    Depressed mood    Moderate protein-calorie malnutrition    Dysphonia    Unilateral complete vocal fold paralysis    Oliguria    Shortness of breath    Adjustment disorder with depressed mood    Deep tissue injury    Blood blister    Lymphadenopathy    At high risk for skin breakdown    Parotid mass    Impaired mobility    CKD (chronic kidney disease) stage V requiring chronic dialysis    At risk for amiodarone toxicity with long term use    Stage 4 chronic kidney disease    End stage heart failure    IVÁN (acute kidney injury)    Pneumonia due to COVID-19 virus    Headache    Hypertension    Weakness    Hyponatremia    Bacteremia    Pneumonia of right lung due to infectious organism    Acute systolic heart failure    Anemia    Weakness of foot, right    Anticoagulant long-term use    Acute renal failure superimposed on stage 4 chronic kidney disease    Encounter for removal of tunneled central venous catheter (CVC) with port    Bacteremia due to Enterococcus    Renal failure syndrome    Acute on chronic combined systolic (congestive) and diastolic (congestive) heart failure    Chronic kidney disease-mineral and bone disorder     Dialysis patient       Patient is homebound due to:  NYHA Class IV HF. S/P LVAD placement.     Diet: Low Fat, Low cholesterol, 2Gm Na, Coumadin restrictions.    Acitivities: No Swimming, bathing, vacuuming, contact sports.    Fresh implants= Sternal Precautions    Nursing:   SN to complete comprehensive assessment including routine vital signs. Instruct on disease process and s/s of complications to report to MD. Review/verify medication list sent home with the patient at time of discharge  and instruct patient/caregiver as needed. Frequency may be adjusted depending on start of care date.    **LVAD driveline exit site dressing change is to be completed per LVAD patient/caregiver only**.    Notify MD if:  SBP > 120 or < 80;   MAP > 80 or < 65;   HR > 120 or < 60;   Temp > 101;   Weight gain >3lbs in 1 day or 5lbs in 1 week.    LABS:  SN to perform labs: PT/INR per Coumadin clinic [phone].  Follow up INR date: 718/24  No Finger Sticks      CONSULTS:      Physical Therapy to evaluate and treat. Evaluate for home safety and equipment needs; Establish/upgrade home exercise program. Perform / instruct on therapeutic exercises, gait training, transfer training, and Range of Motion.    Occupational Therapy to evaluate and treat. Evaluate home environment for safety and equipment needs. Perform/Instruct on transfers, ADL training, ROM, and therapeutic exercises.      Send initial Home Health orders to HTS attending physician on call.  Send follow up questions to VAD clinic MD [phone] or [phone].

## 2024-07-15 ENCOUNTER — TELEPHONE (OUTPATIENT)
Dept: TRANSPLANT | Facility: CLINIC | Age: 64
End: 2024-07-15
Payer: MEDICAID

## 2024-07-15 NOTE — TELEPHONE ENCOUNTER
Spouse paged with questions regarding medication list/instructions after discharging from rehab. Specifically regarding midodrine, states his bp was 113/85 this am and she is unsure if she should give it. Also wants to know if he is supposed to be taking bumex. Advised that we would review d/c med list from rehab and discuss with our physicians and call back. Patient verbalized understanding.

## 2024-07-15 NOTE — TELEPHONE ENCOUNTER
Followed up with Arlyn, patient's wife, as she was concerned about patient not having Bumex on his med list and no Midodrine since Friday ([facility] to order).   Bumex was discontinued d/t HD MWF and not needing Bumex as of  now. I OK'D with Dr. Price for patient to have HD without the Midodrine for now. BP since Friday has been 110s/80s without the Midodrine.   Mrs. Stoddard is to still  the Midodrine from [facility] but hold and monitor BP.

## 2024-07-16 ENCOUNTER — LAB VISIT (OUTPATIENT)
Dept: LAB | Facility: HOSPITAL | Age: 64
End: 2024-07-16
Attending: INTERNAL MEDICINE
Payer: MEDICAID

## 2024-07-16 ENCOUNTER — TELEPHONE (OUTPATIENT)
Dept: TRANSPLANT | Facility: CLINIC | Age: 64
End: 2024-07-16
Payer: MEDICAID

## 2024-07-16 ENCOUNTER — ANTI-COAG VISIT (OUTPATIENT)
Dept: CARDIOLOGY | Facility: CLINIC | Age: 64
End: 2024-07-16
Payer: MEDICAID

## 2024-07-16 DIAGNOSIS — Z95.811 PRESENCE OF VENTRICULAR ASSIST DEVICE: ICD-10-CM

## 2024-07-16 DIAGNOSIS — Z95.811 LVAD (LEFT VENTRICULAR ASSIST DEVICE) PRESENT: Primary | ICD-10-CM

## 2024-07-16 LAB
INR PPP: 1.8 (ref 0.8–1.2)
PROTHROMBIN TIME: 19.3 SEC (ref 9–12.5)

## 2024-07-16 PROCEDURE — 93793 ANTICOAG MGMT PT WARFARIN: CPT | Mod: ,,,

## 2024-07-16 PROCEDURE — 36415 COLL VENOUS BLD VENIPUNCTURE: CPT | Performed by: INTERNAL MEDICINE

## 2024-07-16 PROCEDURE — 85610 PROTHROMBIN TIME: CPT | Performed by: INTERNAL MEDICINE

## 2024-07-16 NOTE — TELEPHONE ENCOUNTER
----- Message from Marilu Ma RN sent at 7/15/2024 12:08 PM CDT -----  Regarding: check on patient and BP s/p HD without Midodrine  See BL note from 7/15

## 2024-07-16 NOTE — PROGRESS NOTES
[facility]    2024 11:04 AM    Assessment/Plan:    Patient presents today with subtherapeutic  INR.    Assessment of patient findings and chart review: no significant findings     Recommendation for patient's warfarin regimen: Boost dose today to 3.75mg then resume current maintenance dose    Recommend repeat INR Friday  _________________________________________________________________    [facility]64 [facility] by the [facility].    Anticoagulation Summary  As of 2024      INR goal:  2.0-3.0   TTR:  53.2% (3.3 mo)   INR used for dosin.8 (2024)   Warfarin maintenance plan:  2.5 mg (2.5 mg x 1) every day   Weekly warfarin total:  17.5 mg   Plan last modified:  Jesenia Jensen, PharmD (2024)   Next INR check:  2024   Target end date:      Indications    LVAD (left ventricular assist device) present [Z95.811]                 Anticoagulation Episode Summary       INR check location:      Preferred lab:      Send INR reminders to:  [facility] COUMADIN LVAD    Comments:  [facility]  ( [phone], fax [phone])  // [employer]          Anticoagulation Care Providers       Provider Role Specialty Phone number    Sajan Hurley MD [facility] Cardiology [phone]

## 2024-07-16 NOTE — TELEPHONE ENCOUNTER
Called to check on patient after dialysis, wife reported that BP is 106/53 this AM and had no issues during dialysis. Instructed wife to continue to monitor and if there are any concerns to notify us to which she verbalized understanding and agreement.

## 2024-07-17 ENCOUNTER — TELEPHONE (OUTPATIENT)
Dept: TRANSPLANT | Facility: CLINIC | Age: 64
End: 2024-07-17
Payer: MEDICAID

## 2024-07-17 NOTE — TELEPHONE ENCOUNTER
VAD Contact: Called Caregiver Arlyn  regarding equipment maintenance due at upcoming clinic appointment on 7/25/2024.  Requested Caregiver    to bring Mobile Power Unit (MPU) and Emergency Bag (2 batteries, 2 clips, 1 backup controller) with them to next appointment for maintenance.  Caregiver    verbalized understanding and agreement.    It is medically necessary to ensure patient has properly functioning equipment and wearables to prevent infection, injury or death to patient.

## 2024-07-18 ENCOUNTER — LAB VISIT (OUTPATIENT)
Dept: LAB | Facility: HOSPITAL | Age: 64
End: 2024-07-18
Attending: INTERNAL MEDICINE
Payer: MEDICAID

## 2024-07-18 DIAGNOSIS — I48.0 PAROXYSMAL ATRIAL FIBRILLATION: Primary | ICD-10-CM

## 2024-07-18 LAB
INR PPP: 1.7 (ref 0.8–1.2)
PROTHROMBIN TIME: 18.2 SEC (ref 9–12.5)

## 2024-07-18 PROCEDURE — 36415 COLL VENOUS BLD VENIPUNCTURE: CPT | Performed by: INTERNAL MEDICINE

## 2024-07-18 PROCEDURE — 85610 PROTHROMBIN TIME: CPT | Performed by: INTERNAL MEDICINE

## 2024-07-19 ENCOUNTER — LAB VISIT (OUTPATIENT)
Dept: LAB | Facility: HOSPITAL | Age: 64
End: 2024-07-19
Attending: INTERNAL MEDICINE
Payer: MEDICAID

## 2024-07-19 ENCOUNTER — ANTI-COAG VISIT (OUTPATIENT)
Dept: CARDIOLOGY | Facility: CLINIC | Age: 64
End: 2024-07-19
Payer: MEDICAID

## 2024-07-19 DIAGNOSIS — Z95.811 PRESENCE OF VENTRICULAR ASSIST DEVICE: ICD-10-CM

## 2024-07-19 DIAGNOSIS — Z95.811 LVAD (LEFT VENTRICULAR ASSIST DEVICE) PRESENT: Primary | ICD-10-CM

## 2024-07-19 LAB
INR PPP: 1.7 (ref 0.8–1.2)
PROTHROMBIN TIME: 17.8 SEC (ref 9–12.5)

## 2024-07-19 PROCEDURE — 85610 PROTHROMBIN TIME: CPT | Performed by: INTERNAL MEDICINE

## 2024-07-19 PROCEDURE — 36415 COLL VENOUS BLD VENIPUNCTURE: CPT | Performed by: INTERNAL MEDICINE

## 2024-07-19 PROCEDURE — 93793 ANTICOAG MGMT PT WARFARIN: CPT | Mod: ,,,

## 2024-07-19 NOTE — PROGRESS NOTES
Spoke with patient wife nain regarding recent INR results .  Patient questioned and verified correct dosage . Reported recent okra intake 7/17/24 - no other recent changes .

## 2024-07-19 NOTE — PROGRESS NOTES
[facility]    2024 8:59 AM    Assessment/Plan:    Patient presents today with subtherapeutic  INR.    Assessment of patient findings and chart review: Reports okra intake     Recommendation for patient's warfarin regimen: Boost dose today to 3.75mg then increase maintenance dose    Recommend repeat INR Monday  _________________________________________________________________    [facility] Abbott (64 y.o.) is followed by the [facility].    Anticoagulation Summary  As of 2024      INR goal:  2.0-3.0   TTR:  51.6% (3.4 mo)   INR used for dosin.7 (2024)   Warfarin maintenance plan:  3.75 mg (2.5 mg x 1.5) every Tue; 2.5 mg (2.5 mg x 1) all other days   Weekly warfarin total:  18.75 mg   Plan last modified:  Jesenia Jensen, PharmD (2024)   Next INR check:  2024   Target end date:      Indications    LVAD (left ventricular assist device) present [Z95.811]                 Anticoagulation Episode Summary       INR check location:      Preferred lab:      Send INR reminders to:  [facility] COUMADIN LVAD    Comments:  [facility] ( [phone], fax [phone])  // [employer]          Anticoagulation Care Providers       Provider Role Specialty Phone number    Sajan Hurley MD [facility] Cardiology [phone]

## 2024-07-19 NOTE — PROGRESS NOTES
Patient wife advised of dose instructions and verbalized understanding.  Will recheck labs 7/22/24 orders faxed to [facility] ( [phone], fax [phone])

## 2024-07-22 ENCOUNTER — ANTI-COAG VISIT (OUTPATIENT)
Dept: CARDIOLOGY | Facility: CLINIC | Age: 64
End: 2024-07-22
Payer: MEDICAID

## 2024-07-22 ENCOUNTER — TELEPHONE (OUTPATIENT)
Dept: TRANSPLANT | Facility: CLINIC | Age: 64
End: 2024-07-22
Payer: MEDICAID

## 2024-07-22 ENCOUNTER — LAB VISIT (OUTPATIENT)
Dept: LAB | Facility: HOSPITAL | Age: 64
End: 2024-07-22
Attending: INTERNAL MEDICINE
Payer: MEDICAID

## 2024-07-22 DIAGNOSIS — I48.0 PAROXYSMAL ATRIAL FIBRILLATION: ICD-10-CM

## 2024-07-22 DIAGNOSIS — I50.43 ACUTE ON CHRONIC COMBINED SYSTOLIC AND DIASTOLIC HEART FAILURE: Primary | ICD-10-CM

## 2024-07-22 DIAGNOSIS — Z95.811 LVAD (LEFT VENTRICULAR ASSIST DEVICE) PRESENT: Primary | ICD-10-CM

## 2024-07-22 DIAGNOSIS — Z95.811 HEART REPLACED BY HEART ASSIST DEVICE: Primary | ICD-10-CM

## 2024-07-22 PROBLEM — U07.1 PNEUMONIA DUE TO COVID-19 VIRUS: Status: RESOLVED | Noted: 2024-04-16 | Resolved: 2024-07-22

## 2024-07-22 PROBLEM — N17.9 AKI (ACUTE KIDNEY INJURY): Status: RESOLVED | Noted: 2024-03-11 | Resolved: 2024-07-22

## 2024-07-22 PROBLEM — J12.82 PNEUMONIA DUE TO COVID-19 VIRUS: Status: RESOLVED | Noted: 2024-04-16 | Resolved: 2024-07-22

## 2024-07-22 PROBLEM — J18.9 PNEUMONIA OF RIGHT LUNG DUE TO INFECTIOUS ORGANISM: Status: RESOLVED | Noted: 2024-04-19 | Resolved: 2024-07-22

## 2024-07-22 LAB
INR PPP: 1.8 (ref 0.8–1.2)
PROTHROMBIN TIME: 19 SEC (ref 9–12.5)

## 2024-07-22 PROCEDURE — 36415 COLL VENOUS BLD VENIPUNCTURE: CPT | Performed by: INTERNAL MEDICINE

## 2024-07-22 PROCEDURE — 93793 ANTICOAG MGMT PT WARFARIN: CPT | Mod: ,,,

## 2024-07-22 PROCEDURE — 85610 PROTHROMBIN TIME: CPT | Performed by: INTERNAL MEDICINE

## 2024-07-22 RX ORDER — BUMETANIDE 2 MG/1
2 TABLET ORAL DAILY
Qty: 30 TABLET | Refills: 11 | Status: ON HOLD | OUTPATIENT
Start: 2024-07-22 | End: 2024-07-25 | Stop reason: HOSPADM

## 2024-07-22 NOTE — TELEPHONE ENCOUNTER
----- Message from Yee Hart sent at 7/22/2024  8:33 AM CDT -----  Regarding: wt gain  Pls call Brenda with [facility] [phone].  Pt had a 5 lb wt gain since last Thursday and was not put back on his fluid meds.    Thank you

## 2024-07-22 NOTE — PROGRESS NOTES
[facility]    2024 9:36 AM    Assessment/Plan:    Patient presents today with subtherapeutic  INR.    Assessment of patient findings and chart review: Reports bloodshot eye - will ask that he monitor and be evaluated if develops pain or visual disturbances; also reports SOB and edema - VAD team aware    Recommendation for patient's warfarin regimen: Boost dose today to 3.75mg then increase maintenance dose    Recommend repeat INR Thursday  _________________________________________________________________    [facility]64 [facility] by the [facility] [facility].    Anticoagulation Summary  As of 2024      INR goal:  2.0-3.0   TTR:  50.1% (3.5 mo)   INR used for dosin.8 (2024)   Warfarin maintenance plan:  3.75 mg (2.5 mg x 1.5) every Tue, Fri; 2.5 mg (2.5 mg x 1) all other days   Weekly warfarin total:  20 mg   Plan last modified:  Jesenia Jensen, PharmD (2024)   Next INR check:  2024   Target end date:      Indications    LVAD (left ventricular assist device) present [Z95.811]                 Anticoagulation Episode Summary       INR check location:      Preferred lab:      Send INR reminders to:  [facility] COUMADIN LVAD    Comments:  [facility] ( [phone], fax [phone])  // [employer]          Anticoagulation Care Providers       Provider Role Specialty Phone number    Sajan Hurley MD [facility] Cardiology [phone]

## 2024-07-22 NOTE — TELEPHONE ENCOUNTER
Arlyn paged to report Ledric is up 5 lbs over 3 days. He was SOB last night, had to sleep on extra pillows.  She reports they are only taking off 1 liter at HD.  She told me that rehab stopped his diuretics at D/C from rehab even though he was taking it there. He was on bumex 2 mg daily and took metolazone sometimes if needed. She is asking if he can take those again.   Discussed with Dr. Mcintyre. Verbal orders received to have pt take 1 metolazone and bumex 2 mg now. Provided these directions to wife, Arlyn, who verbalized understanding and agreement.  I asked Arlyn to call me back today and let me know if these medications worked.

## 2024-07-22 NOTE — TELEPHONE ENCOUNTER
Returned call to home health, connected RN with [facility] who had spoken with patient over the weekend regarding weight gain.

## 2024-07-22 NOTE — PROGRESS NOTES
Arlyn reports correct Warfarin dose, bloodshot left eye since 7/21/2024, denies any trauma to his eye(stated LVAD Team aware), LE edema, sob when lying down at night(stated LVAD Team aware), no other changes reported.

## 2024-07-23 ENCOUNTER — HOSPITAL ENCOUNTER (INPATIENT)
Facility: HOSPITAL | Age: 64
LOS: 2 days | Discharge: HOME | DRG: 292 | End: 2024-07-25
Attending: EMERGENCY MEDICINE | Admitting: INTERNAL MEDICINE
Payer: MEDICAID

## 2024-07-23 ENCOUNTER — TELEPHONE (OUTPATIENT)
Dept: TRANSPLANT | Facility: CLINIC | Age: 64
End: 2024-07-23
Payer: MEDICAID

## 2024-07-23 DIAGNOSIS — Z95.811 LVAD (LEFT VENTRICULAR ASSIST DEVICE) PRESENT: ICD-10-CM

## 2024-07-23 DIAGNOSIS — R06.02 SHORTNESS OF BREATH: ICD-10-CM

## 2024-07-23 DIAGNOSIS — I50.9 ACUTE DECOMPENSATED HEART FAILURE: ICD-10-CM

## 2024-07-23 LAB
ABO + RH BLD: NORMAL
ALBUMIN SERPL BCP-MCNC: 2.1 G/DL (ref 3.5–5.2)
ALLENS TEST: NORMAL
ALP SERPL-CCNC: 172 U/L (ref 55–135)
ALT SERPL W/O P-5'-P-CCNC: 26 U/L (ref 10–44)
ANION GAP SERPL CALC-SCNC: 9 MMOL/L (ref 8–16)
AST SERPL-CCNC: 22 U/L (ref 10–40)
BASOPHILS # BLD AUTO: 0.02 K/UL (ref 0–0.2)
BASOPHILS NFR BLD: 0.3 % (ref 0–1.9)
BILIRUB SERPL-MCNC: 0.2 MG/DL (ref 0.1–1)
BLD GP AB SCN CELLS X3 SERPL QL: NORMAL
BNP SERPL-MCNC: 1665 PG/ML (ref 0–99)
BUN SERPL-MCNC: 27 MG/DL (ref 8–23)
BUN SERPL-MCNC: 70 MG/DL (ref 6–30)
CALCIUM SERPL-MCNC: 7.9 MG/DL (ref 8.7–10.5)
CHLORIDE SERPL-SCNC: 106 MMOL/L (ref 95–110)
CHLORIDE SERPL-SCNC: 98 MMOL/L (ref 95–110)
CO2 SERPL-SCNC: 23 MMOL/L (ref 23–29)
CREAT SERPL-MCNC: 3.6 MG/DL (ref 0.5–1.4)
CREAT SERPL-MCNC: 3.9 MG/DL (ref 0.5–1.4)
DIFFERENTIAL METHOD BLD: ABNORMAL
EOSINOPHIL # BLD AUTO: 0.2 K/UL (ref 0–0.5)
EOSINOPHIL NFR BLD: 3.2 % (ref 0–8)
ERYTHROCYTE [DISTWIDTH] IN BLOOD BY AUTOMATED COUNT: 18.6 % (ref 11.5–14.5)
EST. GFR  (NO RACE VARIABLE): 18.3 ML/MIN/1.73 M^2
GLUCOSE SERPL-MCNC: 243 MG/DL (ref 70–110)
GLUCOSE SERPL-MCNC: 97 MG/DL (ref 70–110)
HCT VFR BLD AUTO: 22.3 % (ref 40–54)
HCT VFR BLD CALC: 39 %PCV (ref 36–54)
HGB BLD-MCNC: 6.7 G/DL (ref 14–18)
IMM GRANULOCYTES # BLD AUTO: 0.07 K/UL (ref 0–0.04)
IMM GRANULOCYTES NFR BLD AUTO: 1.1 % (ref 0–0.5)
INR PPP: 1.9 (ref 0.8–1.2)
LDH SERPL L TO P-CCNC: 1.23 MMOL/L (ref 0.5–2.2)
LYMPHOCYTES # BLD AUTO: 0.5 K/UL (ref 1–4.8)
LYMPHOCYTES NFR BLD: 7.2 % (ref 18–48)
MCH RBC QN AUTO: 27.6 PG (ref 27–31)
MCHC RBC AUTO-ENTMCNC: 30 G/DL (ref 32–36)
MCV RBC AUTO: 92 FL (ref 82–98)
MONOCYTES # BLD AUTO: 0.4 K/UL (ref 0.3–1)
MONOCYTES NFR BLD: 6 % (ref 4–15)
NEUTROPHILS # BLD AUTO: 5.5 K/UL (ref 1.8–7.7)
NEUTROPHILS NFR BLD: 82.2 % (ref 38–73)
NRBC BLD-RTO: 0 /100 WBC
OHS QRS DURATION: 180 MS
OHS QTC CALCULATION: 447 MS
PLATELET # BLD AUTO: 305 K/UL (ref 150–450)
PMV BLD AUTO: 9.2 FL (ref 9.2–12.9)
POC IONIZED CALCIUM: 1.18 MMOL/L (ref 1.06–1.42)
POC TCO2 (MEASURED): 31 MMOL/L (ref 23–29)
POTASSIUM BLD-SCNC: 5.7 MMOL/L (ref 3.5–5.1)
POTASSIUM SERPL-SCNC: 3.2 MMOL/L (ref 3.5–5.1)
PROT SERPL-MCNC: 6.9 G/DL (ref 6–8.4)
PROTHROMBIN TIME: 20.3 SEC (ref 9–12.5)
RBC # BLD AUTO: 2.43 M/UL (ref 4.6–6.2)
SAMPLE: ABNORMAL
SAMPLE: NORMAL
SITE: NORMAL
SODIUM BLD-SCNC: 138 MMOL/L (ref 136–145)
SODIUM SERPL-SCNC: 138 MMOL/L (ref 136–145)
SPECIMEN OUTDATE: NORMAL
TROPONIN I SERPL DL<=0.01 NG/ML-MCNC: 0.02 NG/ML (ref 0–0.03)
WBC # BLD AUTO: 6.65 K/UL (ref 3.9–12.7)

## 2024-07-23 PROCEDURE — 86850 RBC ANTIBODY SCREEN: CPT

## 2024-07-23 PROCEDURE — 85610 PROTHROMBIN TIME: CPT

## 2024-07-23 PROCEDURE — 86901 BLOOD TYPING SEROLOGIC RH(D): CPT

## 2024-07-23 PROCEDURE — 25000003 PHARM REV CODE 250: Performed by: STUDENT IN AN ORGANIZED HEALTH CARE EDUCATION/TRAINING PROGRAM

## 2024-07-23 PROCEDURE — 5A1D70Z PERFORMANCE OF URINARY FILTRATION, INTERMITTENT, LESS THAN 6 HOURS PER DAY: ICD-10-PCS | Performed by: INTERNAL MEDICINE

## 2024-07-23 PROCEDURE — 27000248 HC VAD-ADDITIONAL DAY

## 2024-07-23 PROCEDURE — 83880 ASSAY OF NATRIURETIC PEPTIDE: CPT | Performed by: EMERGENCY MEDICINE

## 2024-07-23 PROCEDURE — 93010 ELECTROCARDIOGRAM REPORT: CPT | Mod: ,,, | Performed by: INTERNAL MEDICINE

## 2024-07-23 PROCEDURE — 93005 ELECTROCARDIOGRAM TRACING: CPT

## 2024-07-23 PROCEDURE — 93750 INTERROGATION VAD IN PERSON: CPT | Mod: ,,, | Performed by: INTERNAL MEDICINE

## 2024-07-23 PROCEDURE — 86900 BLOOD TYPING SEROLOGIC ABO: CPT

## 2024-07-23 PROCEDURE — 80053 COMPREHEN METABOLIC PANEL: CPT | Performed by: EMERGENCY MEDICINE

## 2024-07-23 PROCEDURE — 99222 1ST HOSP IP/OBS MODERATE 55: CPT | Mod: ,,, | Performed by: NURSE PRACTITIONER

## 2024-07-23 PROCEDURE — 85025 COMPLETE CBC W/AUTO DIFF WBC: CPT | Performed by: EMERGENCY MEDICINE

## 2024-07-23 PROCEDURE — 63600175 PHARM REV CODE 636 W HCPCS: Performed by: PHYSICIAN ASSISTANT

## 2024-07-23 PROCEDURE — 99900035 HC TECH TIME PER 15 MIN (STAT)

## 2024-07-23 PROCEDURE — 83605 ASSAY OF LACTIC ACID: CPT

## 2024-07-23 PROCEDURE — 84484 ASSAY OF TROPONIN QUANT: CPT | Performed by: EMERGENCY MEDICINE

## 2024-07-23 PROCEDURE — 30233N1 TRANSFUSION OF NONAUTOLOGOUS RED BLOOD CELLS INTO PERIPHERAL VEIN, PERCUTANEOUS APPROACH: ICD-10-PCS | Performed by: INTERNAL MEDICINE

## 2024-07-23 PROCEDURE — 20600001 HC STEP DOWN PRIVATE ROOM

## 2024-07-23 PROCEDURE — 99233 SBSQ HOSP IP/OBS HIGH 50: CPT | Mod: ,,, | Performed by: PHYSICIAN ASSISTANT

## 2024-07-23 RX ORDER — PANTOPRAZOLE SODIUM 40 MG/1
40 TABLET, DELAYED RELEASE ORAL DAILY
Status: DISCONTINUED | OUTPATIENT
Start: 2024-07-23 | End: 2024-07-25 | Stop reason: HOSPADM

## 2024-07-23 RX ORDER — AMIODARONE HYDROCHLORIDE 200 MG/1
400 TABLET ORAL DAILY
Status: DISCONTINUED | OUTPATIENT
Start: 2024-07-24 | End: 2024-07-25 | Stop reason: HOSPADM

## 2024-07-23 RX ORDER — ACETAMINOPHEN 500 MG
1000 TABLET ORAL EVERY 8 HOURS PRN
Status: DISCONTINUED | OUTPATIENT
Start: 2024-07-23 | End: 2024-07-25 | Stop reason: HOSPADM

## 2024-07-23 RX ORDER — WARFARIN 2.5 MG/1
2.5 TABLET ORAL DAILY
Status: DISCONTINUED | OUTPATIENT
Start: 2024-07-23 | End: 2024-07-25 | Stop reason: HOSPADM

## 2024-07-23 RX ORDER — GABAPENTIN 100 MG/1
100 CAPSULE ORAL 2 TIMES DAILY
Status: DISCONTINUED | OUTPATIENT
Start: 2024-07-23 | End: 2024-07-25 | Stop reason: HOSPADM

## 2024-07-23 RX ORDER — SEVELAMER CARBONATE FOR ORAL SUSPENSION 800 MG/1
0.8 POWDER, FOR SUSPENSION ORAL
Status: DISCONTINUED | OUTPATIENT
Start: 2024-07-23 | End: 2024-07-25 | Stop reason: HOSPADM

## 2024-07-23 RX ORDER — AMOXICILLIN 250 MG
2 CAPSULE ORAL DAILY PRN
Status: DISCONTINUED | OUTPATIENT
Start: 2024-07-23 | End: 2024-07-25 | Stop reason: HOSPADM

## 2024-07-23 RX ORDER — SODIUM BICARBONATE 650 MG/1
650 TABLET ORAL 3 TIMES DAILY
Status: DISCONTINUED | OUTPATIENT
Start: 2024-07-23 | End: 2024-07-23

## 2024-07-23 RX ORDER — ATORVASTATIN CALCIUM 20 MG/1
40 TABLET, FILM COATED ORAL NIGHTLY
Status: DISCONTINUED | OUTPATIENT
Start: 2024-07-23 | End: 2024-07-25 | Stop reason: HOSPADM

## 2024-07-23 RX ORDER — HYDRALAZINE HYDROCHLORIDE 25 MG/1
25 TABLET, FILM COATED ORAL ONCE
Status: COMPLETED | OUTPATIENT
Start: 2024-07-23 | End: 2024-07-23

## 2024-07-23 RX ORDER — FUROSEMIDE 10 MG/ML
80 INJECTION INTRAMUSCULAR; INTRAVENOUS ONCE
Status: COMPLETED | OUTPATIENT
Start: 2024-07-23 | End: 2024-07-23

## 2024-07-23 RX ORDER — SODIUM CHLORIDE 9 MG/ML
INJECTION, SOLUTION INTRAVENOUS ONCE
Status: CANCELLED | OUTPATIENT
Start: 2024-07-23 | End: 2024-07-23

## 2024-07-23 RX ADMIN — FUROSEMIDE 20 MG/HR: 10 INJECTION, SOLUTION INTRAMUSCULAR; INTRAVENOUS at 04:07

## 2024-07-23 RX ADMIN — ATORVASTATIN CALCIUM 40 MG: 40 TABLET, FILM COATED ORAL at 09:07

## 2024-07-23 RX ADMIN — FUROSEMIDE 80 MG: 10 INJECTION, SOLUTION INTRAVENOUS at 03:07

## 2024-07-23 RX ADMIN — PANTOPRAZOLE SODIUM 40 MG: 40 TABLET, DELAYED RELEASE ORAL at 03:07

## 2024-07-23 RX ADMIN — SEVELAMER CARBONATE 0.8 G: 800 POWDER, FOR SUSPENSION ORAL at 05:07

## 2024-07-23 RX ADMIN — GABAPENTIN 100 MG: 100 CAPSULE ORAL at 09:07

## 2024-07-23 RX ADMIN — WARFARIN SODIUM 2.5 MG: 2.5 TABLET ORAL at 05:07

## 2024-07-23 RX ADMIN — HYDRALAZINE HYDROCHLORIDE 25 MG: 25 TABLET ORAL at 09:07

## 2024-07-23 NOTE — CARE UPDATE
Inpatient consult to Cardiology  Consult performed by: Erin Powell MD  Consult ordered by: Haseeb Castillo MD       Mr. Radha Abbott is a 65 yo male with stage D HFrEF, ICMP who underwent HM3 placement as DT (12/1/23) w/ a hospital stay complicated by vasoplegia(requiring Giaprezza), debility, malnutrition/impaired swallowing, and renal failure requiring HD w/ recent discharge for ADHF presented to the ED with wife due to c/o worsening generalized weakness and shortness of breath. Patient feels like he is retaining fluid and has been sleeping in a recliner and endorsing lower extremity edema. He underwent dialysis yesterday with 1L removal. He was discharged from rehab without bumex and has noticed decreased in UOP.     BNP 1665 LVAD at 5000rpm. Patient with lower extremity edema, JVD and crackles.    Patient to  be admitted to [facility]. Nephrology consult placed for dialysis.     Discussed with Dr. Gema Casas

## 2024-07-23 NOTE — SUBJECTIVE & OBJECTIVE
Past Medical History:   Diagnosis Date    Aspiration pneumonia of both lungs 06/17/2024    CAD (coronary artery disease)     CHF (congestive heart failure)     Delirium 06/16/2024    Diabetes mellitus     HFrEF (heart failure with reduced ejection fraction)     Hypoglycemia 06/12/2024    ICD (implantable cardioverter-defibrillator) in place     MI, old     Type 2 diabetes mellitus without complication, without long-term current use of insulin 10/07/2023       Past Surgical History:   Procedure Laterality Date    ANGIOPLASTY-VENOUS ARTERY Right 12/1/2023    Procedure: ANGIOPLASTY-VENOUS ARTERY, RIGHT FEMORAL;  Surgeon: Yuri Washington MD;  [facility];  Service: Cardiovascular;  Laterality: Right;    AORTIC VALVULOPLASTY N/A 12/1/2023    Procedure: REPAIR, AORTIC VALVE;  Surgeon: Yuri Washington MD;  [facility];  Service: Cardiovascular;  Laterality: N/A;    CARDIAC SURGERY      CLOSURE N/A 12/1/2023    Procedure: CLOSURE, TEMPORARY;  Surgeon: Yuri Washington MD;  [facility];  Service: Cardiovascular;  Laterality: N/A;    DRAINAGE OF PLEURAL EFFUSION  12/4/2023    Procedure: DRAINAGE, PLEURAL EFFUSION;  Surgeon: Yuri Washington MD;  [facility];  Service: Cardiovascular;;    INSERTION OF GRAFT TO PERICARDIUM  12/4/2023    Procedure: INSERTION, GRAFT, PERICARDIUM;  Surgeon: Yuri Washington MD;  [facility];  Service: Cardiovascular;;    INSERTION OF INTRA-AORTIC BALLOON ASSIST DEVICE Right 11/21/2023    Procedure: INSERTION, INTRA-AORTIC BALLOON PUMP;  Surgeon: Finn Cohn MD;  Location: [facility] CATH LAB;  Service: Cardiology;  Laterality: Right;    LEFT VENTRICULAR ASSIST DEVICE Left 12/1/2023    Procedure: INSERTION-LEFT VENTRICULAR ASSIST DEVICE;  Surgeon: Yuri Washington MD;  Location: [facility] OR [address] [place] [address];  Service: Cardiovascular;  Laterality: Left;  REDO STERNOTOMY - REDO SAW NEEDED FOR CASE    LYSIS OF ADHESIONS  12/1/2023    Procedure:  LYSIS, ADHESIONS;  Surgeon: Yuri Washington MD;  [facility];  Service: Cardiovascular;;    PLACEMENT OF SWAN ROLANDO CATHETER WITH IMAGING GUIDANCE  11/20/2023    Procedure: INSERTION, CATHETER, SWAN-ROLANDO, WITH IMAGING GUIDANCE;  Surgeon: Sajan Hurley MD;  Location: [facility] CATH LAB;  Service: Cardiology;;    REMOVAL OF TUNNELED CENTRAL VENOUS CATHETER (CVC) N/A 3/1/2024    Procedure: REMOVAL, CATHETER, CENTRAL VENOUS, TUNNELED;  Surgeon: Seble Aguilar MD;  Location: [facility] CATH LAB;  Service: Interventional Nephrology;  Laterality: N/A;    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 12/1/2023    Procedure: REPAIR, ANEURYSM, ARTERY, FEMORAL;  Surgeon: Yuri Washington MD;  [facility];  Service: Cardiovascular;  Laterality: Right;  Right Femoral Artery Repair    RIGHT HEART CATHETERIZATION Right 10/10/2023    Procedure: INSERTION, CATHETER, RIGHT HEART;  Surgeon: Bin Gandhi MD;  Location: [facility] CATH LAB;  Service: Cardiology;  Laterality: Right;    RIGHT HEART CATHETERIZATION Right 10/13/2023    Procedure: INSERTION, CATHETER, RIGHT HEART;  Surgeon: Walter Mcintyre MD;  Location: [facility] CATH LAB;  Service: Cardiology;  Laterality: Right;    RIGHT HEART CATHETERIZATION  11/13/2023    RIGHT HEART CATHETERIZATION Right 11/13/2023    Procedure: INSERTION, CATHETER, RIGHT HEART;  Surgeon: Juventino Bermudez Jr., MD;  Location: [facility] CATH LAB;  Service: Cardiology;  Laterality: Right;    RIGHT HEART CATHETERIZATION Right 11/20/2023    Procedure: INSERTION, CATHETER, RIGHT HEART;  Surgeon: Sajan Hurley MD;  Location: [facility] CATH LAB;  Service: Cardiology;  Laterality: Right;    RIGHT HEART CATHETERIZATION Right 1/22/2024    Procedure: INSERTION, CATHETER, RIGHT HEART;  Surgeon: Brayan Ocampo MD;  Location: [facility] CATH LAB;  Service: Cardiology;  Laterality: Right;    STERNAL WOUND CLOSURE N/A 12/4/2023    Procedure: CLOSURE, WOUND, STERNUM;  Surgeon: Yuri Washington MD;   [facility];  Service: Cardiovascular;  Laterality: N/A;    STERNOTOMY N/A 12/1/2023    Procedure: STERNOTOMY, REDO;  Surgeon: Yuri Washington MD;  [facility];  Service: Cardiovascular;  Laterality: N/A;    VALVULOPLASTY, MITRAL VALVE N/A 12/1/2023    Procedure: VALVULOPLASTY, MITRAL VALVE;  Surgeon: Yuri Washington MD;  [facility];  Service: Cardiovascular;  Laterality: N/A;       Review of patient's allergies indicates:  No Known Allergies    Current Facility-Administered Medications   Medication    acetaminophen tablet 1,000 mg    [START ON 7/24/2024] amiodarone tablet 400 mg    atorvastatin tablet 40 mg    furosemide (Lasix) 500 mg in 50 mL infusion (conc: 10 mg/mL)    furosemide injection 80 mg    gabapentin capsule 100 mg    pantoprazole EC tablet 40 mg    senna-docusate 8.6-50 mg per tablet 2 tablet    sevelamer carbonate pwpk 0.8 g    warfarin (COUMADIN) tablet 2.5 mg     Current Outpatient Medications   Medication Sig    acetaminophen (TYLENOL) 500 MG tablet Take 2 tablets (1,000 mg total) by mouth every 8 (eight) hours as needed for Pain.    amiodarone (PACERONE) 400 MG tablet Take 1 tablet (400 mg total) by mouth once daily.    amoxicillin (AMOXIL) 500 MG capsule Take 1 capsule (500 mg total) by mouth once daily.    atorvastatin (LIPITOR) 40 MG tablet Take 1 tablet (40 mg total) by mouth every evening.    bumetanide (BUMEX) 2 MG tablet Take 1 tablet (2 mg total) by mouth once daily.    gabapentin (NEURONTIN) 100 MG capsule Take 1 capsule (100 mg total) by mouth 2 (two) times daily.    insulin aspart U-100 (NOVOLOG) 100 unit/mL (3 mL) InPn pen Inject 0-10 Units into the skin before meals and at bedtime as needed (Hyperglycemia).    midodrine (PROAMATINE) 10 MG tablet Take 1 tablet (10 mg total) by mouth every 8 (eight) hours.    omega-3 acid ethyl esters (LOVAZA) 1 gram capsule Take 2 capsules (2 g total) by mouth 2 (two) times daily.    pantoprazole (PROTONIX)  40 MG tablet Take 1 tablet (40 mg total) by mouth once daily.    pulse oximeter (PULSE OXIMETER) device by Apply Externally route 2 (two) times a day. Use twice daily at 8 AM and 3 PM and [facility] as directed.    senna-docusate 8.6-50 mg (PERICOLACE) 8.6-50 mg per tablet Take 2 tablets by mouth daily as needed for Constipation.    sevelamer carbonate (RENVELA) 0.8 gram PwPk Take 1 packet (0.8 g total) by mouth 3 (three) times daily with meals.    sodium bicarbonate 650 MG tablet Take 1 tablet (650 mg total) by mouth 3 (three) times daily.    traZODone (DESYREL) 50 MG tablet Take 0.5 tablets (25 mg total) by mouth every evening.    venlafaxine (EFFEXOR) 37.5 MG Tab Take 1 tablet (37.5 mg total) by mouth once daily.    vitamin D (VITAMIN D3) 1000 units Tab Take 1 tablet (1,000 Units total) by mouth once daily.    warfarin (COUMADIN) 2.5 MG tablet Take 1 tablet (2.5 mg total) by mouth Daily.     Family History    None       Tobacco Use    Smoking status: Former     Current packs/day: 0.50     Types: Cigarettes    Smokeless tobacco: Not on file   Substance and Sexual Activity    Alcohol use: Yes     Comment: rarely    Drug use: No    Sexual activity: Not Currently     Partners: Female     Review of Systems   Constitutional:  Positive for fatigue. Negative for chills and fever.   HENT:  Negative for congestion.    Eyes:  Negative for visual disturbance.   Respiratory:  Positive for shortness of breath. Negative for cough and wheezing.    Gastrointestinal:  Positive for abdominal distention. Negative for abdominal pain, constipation, diarrhea, nausea and vomiting.   Genitourinary:  Negative for difficulty urinating.   Musculoskeletal:  Negative for back pain and neck pain.   Skin:  Negative for pallor.   Neurological:  Positive for weakness (generalized). Negative for dizziness.     Objective:     Vital Signs (Most Recent):  Temp: 99.1 °F (37.3 °C) (07/23/24 1114)  Pulse: 91 (07/23/24 1114)  Resp: 20  "(07/23/24 1114)  BP: (!) 108/0 (07/23/24 1400)  SpO2: 96 % (07/23/24 1114) Vital Signs (24h Range):  Temp:  [99.1 °F (37.3 °C)] 99.1 °F (37.3 °C)  Pulse:  [91] 91  Resp:  [20] 20  SpO2:  [96 %] 96 %  BP: (108-110)/(0-58) 108/0     Patient Vitals for the past 72 hrs (Last 3 readings):   Weight   07/23/24 1114 83.5 kg (184 lb)     Body mass index is 24.28 kg/m².    No intake or output data in the 24 hours ending 07/23/24 1440       Physical Exam  Vitals and nursing note reviewed.   HENT:      Head: Normocephalic.      Nose: Nose normal.   Eyes:      Conjunctiva/sclera: Conjunctivae normal.   Cardiovascular:      Rate and Rhythm: Normal rate and regular rhythm.      Comments: VAD hum  Pulmonary:      Effort: Pulmonary effort is normal.   Abdominal:      General: Abdomen is flat. There is distension.   Musculoskeletal:      Cervical back: Normal range of motion.      Right lower leg: Edema present.      Left lower leg: Edema present.   Skin:     General: Skin is warm.      Capillary Refill: Capillary refill takes less than 2 seconds.   Neurological:      General: No focal deficit present.      Mental Status: He is alert.   Psychiatric:         Mood and Affect: Mood normal.         Behavior: Behavior normal.         Thought Content: Thought content normal.         Judgment: Judgment normal.            Significant Labs:  CBC:  Recent Labs   Lab 07/23/24  1135   WBC 6.65   RBC 2.43*   HGB 6.7*   HCT 22.3*      MCV 92   MCH 27.6   MCHC 30.0*     BNP:  Recent Labs   Lab 07/23/24  1135   BNP 1,665*     CMP:  Recent Labs   Lab 07/23/24  1135   *   CALCIUM 7.9*   ALBUMIN 2.1*   PROT 6.9      K 3.2*   CO2 23      BUN 27*   CREATININE 3.6*   ALKPHOS 172*   ALT 26   AST 22   BILITOT 0.2      Coagulation:   Recent Labs   Lab 07/19/24  0736 07/22/24  0715 07/23/24  1327   INR 1.7* 1.8* 1.9*     LDH:  No results for input(s): "LDH" in the last 72 hours.  Microbiology:  Microbiology Results (last 7 days)  "      ** No results found for the last 168 hours. **            I have reviewed all pertinent labs within the past 24 hours.    Diagnostic Results:  I have reviewed all pertinent imaging results/findings within the past 24 hours.

## 2024-07-23 NOTE — HPI
Mr. Radha Abbott is a 65 yo male with stage D HFrEF, ICMP who underwent HM3 placement as DT (12/1/23) w/ a hospital stay complicated by vasoplegia(requiring Giaprezza), debility, malnutrition/impaired swallowing, and renal failure requiring HD w/ recent discharge for ADHF presented to the ED with wife due to c/o worsening generalized weakness and shortness of breath. Patient feels like he is retaining fluid and has been sleeping in a recliner and endorsing lower extremity edema. He was discharged from rehab without bumex and has noticed decreased in UOP. He reports his outpatient dialysis unit was told they could not remove more than 1 L of fluid each session, so he has only been having 1 L removed at each session. Last dialysis session yesterday with 1 L removal.      Patient to  be admitted to [facility]. Nephrology consult placed for dialysis.

## 2024-07-23 NOTE — TELEPHONE ENCOUNTER
Following up from stevie to herminia  overnight. Called [facility] asking for CMP, Mg and BNP this morning if possible. Spoke with harriet, they will send someone out.     Arlyn told [facility] is only allowed to remove 1 L.  I call HD, spoke with Fernando.  He reported they were told by someone here that 1 L was the max to remove on these tenuous patients.  I explained the importance of  acquiring euvolemic status to prevent RV failure.      Restarting bumex 2 mg daily.  Left  for pt and wife. Also sent my chart message.

## 2024-07-23 NOTE — CONSULTS
[facility] - Emergency Dept  Nephrology  Consult Note    Patient Name: Radha Abbott  MRN: [id number]  Admission Date: 7/23/2024  Hospital Length of Stay: 0 days  Attending Provider: Sajan Hurley, *   Primary Care Physician: Vasu Kong MD  Principal Problem:Acute decompensated heart failure    Inpatient consult to Nephrology  Consult performed by: Yolanda Cai, RONI  Consult ordered by: Erin Powell MD  Reason for consult: ESRD        Subjective:     HPI: Mr. Radha Abbott is a 63 yo male with stage D HFrEF, ICMP who underwent HM3 placement as DT (12/1/23) w/ a hospital stay complicated by vasoplegia(requiring Giaprezza), debility, malnutrition/impaired swallowing, and renal failure requiring HD w/ recent discharge for ADHF presented to the ED with wife due to c/o worsening generalized weakness and shortness of breath. Patient feels like he is retaining fluid and has been sleeping in a recliner and endorsing lower extremity edema. He was discharged from rehab without bumex and has noticed decreased in UOP. He reports his outpatient dialysis unit was told they could not remove more than 1 L of fluid each session, so he has only been having 1 L removed at each session. Last dialysis session yesterday with 1 L removal.      Patient to  be admitted to [facility]. Nephrology consult placed for dialysis.        Past Medical History:   Diagnosis Date    Aspiration pneumonia of both lungs 06/17/2024    CAD (coronary artery disease)     CHF (congestive heart failure)     Delirium 06/16/2024    Diabetes mellitus     HFrEF (heart failure with reduced ejection fraction)     Hypoglycemia 06/12/2024    ICD (implantable cardioverter-defibrillator) in place     MI, old     Type 2 diabetes mellitus without complication, without long-term current use of insulin 10/07/2023       Past Surgical History:   Procedure Laterality Date    ANGIOPLASTY-VENOUS ARTERY Right 12/1/2023    Procedure: ANGIOPLASTY-VENOUS  ARTERY, RIGHT FEMORAL;  Surgeon: Yuri Washington MD;  Location: [facility] OR 2ND FLR;  Service: Cardiovascular;  Laterality: Right;    AORTIC VALVULOPLASTY N/A 12/1/2023    Procedure: REPAIR, AORTIC VALVE;  Surgeon: Yuri Washington MD;  Location: [facility] OR 2ND FLR;  Service: Cardiovascular;  Laterality: N/A;    CARDIAC SURGERY      CLOSURE N/A 12/1/2023    Procedure: CLOSURE, TEMPORARY;  Surgeon: Yuri Washington MD;  Location: [facility] OR [facility] FLR;  Service: Cardiovascular;  Laterality: N/A;    DRAINAGE OF PLEURAL EFFUSION  12/4/2023    Procedure: DRAINAGE, PLEURAL EFFUSION;  Surgeon: Yuri Washington MD;  [facility];  Service: Cardiovascular;;    INSERTION OF GRAFT TO PERICARDIUM  12/4/2023    Procedure: INSERTION, GRAFT, PERICARDIUM;  Surgeon: Yuri Washington MD;  [facility];  Service: Cardiovascular;;    INSERTION OF INTRA-AORTIC BALLOON ASSIST DEVICE Right 11/21/2023    Procedure: INSERTION, INTRA-AORTIC BALLOON PUMP;  Surgeon: Finn Cohn MD;  Location: [facility] CATH LAB;  Service: Cardiology;  Laterality: Right;    LEFT VENTRICULAR ASSIST DEVICE Left 12/1/2023    Procedure: INSERTION-LEFT VENTRICULAR ASSIST DEVICE;  Surgeon: Yuri Washington MD;  [facility];  Service: Cardiovascular;  Laterality: Left;  REDO STERNOTOMY - REDO SAW NEEDED FOR CASE    LYSIS OF ADHESIONS  12/1/2023    Procedure: LYSIS, ADHESIONS;  Surgeon: Yuri Washington MD;  [facility];  Service: Cardiovascular;;    PLACEMENT OF SWAN ROLANDO CATHETER WITH IMAGING GUIDANCE  11/20/2023    Procedure: INSERTION, CATHETER, SWAN-ROLANDO, WITH IMAGING GUIDANCE;  Surgeon: Sajan Hurley MD;  Location: [facility] CATH LAB;  Service: Cardiology;;    REMOVAL OF TUNNELED CENTRAL VENOUS CATHETER (CVC) N/A 3/1/2024    Procedure: REMOVAL, CATHETER, CENTRAL VENOUS, TUNNELED;  Surgeon: Seble Aguilar MD;  Location: [facility] CATH LAB;  Service: Interventional Nephrology;  Laterality: N/A;    REPAIR OF ANEURYSM OF  FEMORAL ARTERY Right 12/1/2023    Procedure: REPAIR, ANEURYSM, ARTERY, FEMORAL;  Surgeon: Yuri Washington MD;  Location: [address];  Service: Cardiovascular;  Laterality: Right;  Right Femoral Artery Repair    RIGHT HEART CATHETERIZATION Right 10/10/2023    Procedure: INSERTION, CATHETER, RIGHT HEART;  Surgeon: Bin Gandhi MD;  Location: [facility] CATH LAB;  Service: Cardiology;  Laterality: Right;    RIGHT HEART CATHETERIZATION Right 10/13/2023    Procedure: INSERTION, CATHETER, RIGHT HEART;  Surgeon: Walter Mcintyre MD;  Location: [facility] CATH LAB;  Service: Cardiology;  Laterality: Right;    RIGHT HEART CATHETERIZATION  11/13/2023    RIGHT HEART CATHETERIZATION Right 11/13/2023    Procedure: INSERTION, CATHETER, RIGHT HEART;  Surgeon: Juventino Bermudez Jr., MD;  Location: [facility] CATH LAB;  Service: Cardiology;  Laterality: Right;    RIGHT HEART CATHETERIZATION Right 11/20/2023    Procedure: INSERTION, CATHETER, RIGHT HEART;  Surgeon: Sajan Hurley MD;  Location: [facility] CATH LAB;  Service: Cardiology;  Laterality: Right;    RIGHT HEART CATHETERIZATION Right 1/22/2024    Procedure: INSERTION, CATHETER, RIGHT HEART;  Surgeon: Brayan Ocampo MD;  Location: [facility] CATH LAB;  Service: Cardiology;  Laterality: Right;    STERNAL WOUND CLOSURE N/A 12/4/2023    Procedure: CLOSURE, WOUND, STERNUM;  Surgeon: Yuri Washington MD;  Location: [address];  Service: Cardiovascular;  Laterality: N/A;    STERNOTOMY N/A 12/1/2023    Procedure: STERNOTOMY, REDO;  Surgeon: Yuri Washington MD;  [address]  Service: Cardiovascular;  Laterality: N/A;    VALVULOPLASTY, MITRAL VALVE N/A 12/1/2023    Procedure: VALVULOPLASTY, MITRAL VALVE;  Surgeon: Yuri Washington MD;  Location: [address];  Service: Cardiovascular;  Laterality: N/A;       Review of patient's allergies indicates:  No Known Allergies  Current Facility-Administered Medications   Medication Frequency    acetaminophen tablet 1,000 mg Q8H PRN     [START ON 7/24/2024] amiodarone tablet 400 mg Daily    atorvastatin tablet 40 mg QHS    furosemide (Lasix) 500 mg in 50 mL infusion (conc: 10 mg/mL) Continuous    furosemide injection 80 mg Once    gabapentin capsule 100 mg BID    pantoprazole EC tablet 40 mg Daily    senna-docusate 8.6-50 mg per tablet 2 tablet Daily PRN    sevelamer carbonate pwpk 0.8 g TID WM    warfarin (COUMADIN) tablet 2.5 mg Daily     Current Outpatient Medications   Medication    acetaminophen (TYLENOL) 500 MG tablet    amiodarone (PACERONE) 400 MG tablet    amoxicillin (AMOXIL) 500 MG capsule    atorvastatin (LIPITOR) 40 MG tablet    bumetanide (BUMEX) 2 MG tablet    gabapentin (NEURONTIN) 100 MG capsule    insulin aspart U-100 (NOVOLOG) 100 unit/mL (3 mL) InPn pen    midodrine (PROAMATINE) 10 MG tablet    omega-3 acid ethyl esters (LOVAZA) 1 gram capsule    pantoprazole (PROTONIX) 40 MG tablet    pulse oximeter (PULSE OXIMETER) device    senna-docusate 8.6-50 mg (PERICOLACE) 8.6-50 mg per tablet    sevelamer carbonate (RENVELA) 0.8 gram PwPk    sodium bicarbonate 650 MG tablet    traZODone (DESYREL) 50 MG tablet    venlafaxine (EFFEXOR) 37.5 MG Tab    vitamin D (VITAMIN D3) 1000 units Tab    warfarin (COUMADIN) 2.5 MG tablet     Family History    None       Tobacco Use    Smoking status: Former     Current packs/day: 0.50     Types: Cigarettes    Smokeless tobacco: Not on file   Substance and Sexual Activity    Alcohol use: Yes     Comment: rarely    Drug use: No    Sexual activity: Not Currently     Partners: Female     Review of Systems   Constitutional: Negative.    HENT: Negative.     Respiratory:  Positive for shortness of breath.    Cardiovascular:  Negative for chest pain.   Gastrointestinal: Negative.    Genitourinary:  Positive for decreased urine volume.   Musculoskeletal: Negative.    Neurological: Negative.    Psychiatric/Behavioral: Negative.       Objective:     Vital Signs (Most Recent):  Temp: 99.1 °F (37.3 °C) (07/23/24  1114)  Pulse: 91 (24 1114)  Resp: 20 (24 1114)  BP: (!) 108/0 (24 1400)  SpO2: 96 % (24 1114) Vital Signs (24h Range):  Temp:  [99.1 °F (37.3 °C)] 99.1 °F (37.3 °C)  Pulse:  [91] 91  Resp:  [20] 20  SpO2:  [96 %] 96 %  BP: (108-110)/(0-58) 108/0     Weight: 83.5 kg (184 lb) (24 1114)  Body mass index is 24.28 kg/m².  Body surface area is 2.07 meters squared.    No intake/output data recorded.     Physical Exam  Vitals and nursing note reviewed.   Constitutional:       General: He is not in acute distress.  Cardiovascular:      Comments: LVAD hum present. + JVD.   Pulmonary:      Effort: Pulmonary effort is normal.      Breath sounds: Examination of the right-lower field reveals decreased breath sounds. Examination of the left-lower field reveals decreased breath sounds. Decreased breath sounds present.   Chest:      Comments: R CVC present  Musculoskeletal:      Right lower le+ Edema present.      Left lower le+ Edema present.   Skin:     General: Skin is warm and dry.   Neurological:      Mental Status: He is alert and oriented to person, place, and time.   Psychiatric:         Mood and Affect: Mood normal.          Significant Labs:  CBC:   Recent Labs   Lab 24  1135   WBC 6.65   RBC 2.43*   HGB 6.7*   HCT 22.3*      MCV 92   MCH 27.6   MCHC 30.0*     CMP:   Recent Labs   Lab 24  1135   *   CALCIUM 7.9*   ALBUMIN 2.1*   PROT 6.9      K 3.2*   CO2 23      BUN 27*   CREATININE 3.6*   ALKPHOS 172*   ALT 26   AST 22   BILITOT 0.2     All labs within the past 24 hours have been reviewed.    Assessment/Plan:     Cardiac/Vascular  * Acute decompensated heart failure  -UF with HD     LVAD (left ventricular assist device) present  -    Renal/  Acute renal failure superimposed on stage 4 chronic kidney disease  63 yo male with hx of LVAD (2023) and acute on chronic renal failure started on RRT  presenting for SOB. Nephrology consulted for  HD management in the setting of fluid overload. Was suspected at last hospitalization to have progressed to ESRD, has continued MWF HD outpatient. Last HD session yesterday, 1 L removed.     Nephrology History  -[facility]  -Nephrologist: ?  -HD tx days: MWF  -HD tx time: ?  -Last HD tx: 7/22/24  -HD access: R CVC  -HD modality: iHD  -Residual urine: +  -EDW:  ?    Plan/Recommendations  ESRD on HD:  -Given signs of hypervolemia on exam, will plan for UF only treatment this PM for volume removal. Electrolytes stable.  UF goal of 2 L, evaluate patient tolerance. Will plan for HD again tomorrow to resume MWF schedule with additional UF.   -Agree with lasix gtt   -Will continue iHD thrice weekly unless emergent indication arises  -Strict I&Os  -Pre & post HD weight  Anemia in ESRD  -Hgb goal 10-11, hgb 6.7, per primary team suspect dilution, will recheck in AM  Mineral Bone Disease in ESRD  -Check mag, phos  -CoCa 9.4  -Renal diet if not NPO          Thank you for your consult. I will follow-up with patient. Please contact us if you have any additional questions.    Yolanda Cai, RONI  Nephrology  [facility] - Emergency Dept

## 2024-07-23 NOTE — ASSESSMENT & PLAN NOTE
65 yo male with hx of LVAD (12/2023) and acute on chronic renal failure started on RRT 6/13 presenting for SOB. Nephrology consulted for HD management in the setting of fluid overload. Was suspected at last hospitalization to have progressed to ESRD, has continued MWF HD outpatient. Last HD session yesterday, 1 L removed.     Nephrology History  [place]  -Nephrologist: ?  -HD tx days: MWF  -HD tx time: ?  -Last HD tx: 7/22/24  -HD access: R CVC  -HD modality: iHD  -Residual urine: +  -EDW:  ?    Plan/Recommendations  ESRD on HD:  -Given signs of hypervolemia on exam, will plan for UF only treatment this PM for volume removal. Electrolytes stable.  UF goal of 2 L, evaluate patient tolerance. Will plan for HD again tomorrow to resume MWF schedule with additional UF.   -Agree with lasix gtt   -Will continue iHD thrice weekly unless emergent indication arises  -Strict I&Os  -Pre & post HD weight  Anemia in ESRD  -Hgb goal 10-11, hgb 6.7, per primary team suspect dilution, will recheck in AM  Mineral Bone Disease in ESRD  -Check mag, phos  -CoCa 9.4  -Renal diet if not NPO

## 2024-07-23 NOTE — ASSESSMENT & PLAN NOTE
-HeartMate 3 Implanted  12/1/2023  as DT  -Boost Coumadin, Goal INR 2.0-3.0 . Subtherapeutic today.   -Antiplatelets Not on ASA  -LDH is stable overall today. Will continue to monitor daily.  -Speed set at 5000, LSL 4600 rpm  -Interrogation notable for no events  -Not listed for OHTx        Procedure: Device Interrogation Including analysis of device parameters  Current Settings: Ventricular Assist Device  Review of device function is stable/unstable stable        7/23/2024     2:01 PM 7/1/2024     7:45 AM 7/1/2024     4:16 AM 6/30/2024    11:35 PM 6/30/2024     8:13 PM 6/30/2024     4:48 PM 6/30/2024     2:58 PM   TXP LVAD INTERROGATIONS   Type HeartMate3 HeartMate3 HeartMate3 HeartMate3 HeartMate3 HeartMate3 HeartMate3   Flow 3.5 3.9 4.3 4 3.7 3.5 4.4   Speed 5000 5000 5050 5000 5050 5000 5000   PI 6.1 4.7 3.7 4 6 6.6 4.1   Power ([place]) 3.4 3.4 3.4 3.4 3.4 3.4 3.4   LSL  4600 4600 4600 4600 4600 4600   Low Flow Alarm   none none none     High Power Alarm   none none none     Pulsatility Intermittent pulse Intermittent pulse Pulse Pulse Intermittent pulse

## 2024-07-23 NOTE — H&P
[facility] - Emergency Dept  Heart Transplant  H&P    Patient Name: Radha Abbott  MRN: [id number]  Admission Date: 7/23/2024  Attending Physician: Sajan Hurley, *  Primary Care Provider: Vasu Kong MD  Principal Problem:Acute decompensated heart failure    Subjective:     History of Present Illness:  Mr. Radha Abbott is a 65 yo male with stage D HFrEF, ICMP who underwent HM3 placement as DT (12/1/23) w/ a hospital stay complicated by vasoplegia (requiring Giaprezza), debility, malnutrition/impaired swallowing, and renal failure requiring HD w/ recent discharge from rehab on 7/12. Presented to the ED due to worsening weakness, SOB, and LE edema. Pt reports feeling volume overloaded/retaining fluid and has been sleeping in a recliner. Since his discharge from rehab, he was told that he could only remove 1 L of fluid during each HD session, last session was yesterday (MWF). He has also not been taking his  home PO Bumex. Weight at last discharge was 161 lb, weight today is 184 lb. Pt report still making a small amount of urine.    Past Medical History:   Diagnosis Date    Aspiration pneumonia of both lungs 06/17/2024    CAD (coronary artery disease)     CHF (congestive heart failure)     Delirium 06/16/2024    Diabetes mellitus     HFrEF (heart failure with reduced ejection fraction)     Hypoglycemia 06/12/2024    ICD (implantable cardioverter-defibrillator) in place     MI, old     Type 2 diabetes mellitus without complication, without long-term current use of insulin 10/07/2023       Past Surgical History:   Procedure Laterality Date    ANGIOPLASTY-VENOUS ARTERY Right 12/1/2023    Procedure: ANGIOPLASTY-VENOUS ARTERY, RIGHT FEMORAL;  Surgeon: Yuri Washington MD;  Location: [facility] OR [address] [place] [address];  Service: Cardiovascular;  Laterality: Right;    AORTIC VALVULOPLASTY N/A 12/1/2023    Procedure: REPAIR, AORTIC VALVE;  Surgeon: Yuri Washington MD;  Location: [facility] OR [address] [place] [address];  Service: Cardiovascular;  Laterality:  N/A;    CARDIAC SURGERY      CLOSURE N/A 12/1/2023    Procedure: CLOSURE, TEMPORARY;  Surgeon: Yuri Washington MD;  Location: [facility] OR 2ND FLR;  Service: Cardiovascular;  Laterality: N/A;    DRAINAGE OF PLEURAL EFFUSION  12/4/2023    Procedure: DRAINAGE, PLEURAL EFFUSION;  Surgeon: Yuri Washington MD;  Location: [facility] OR 2ND FLR;  Service: Cardiovascular;;    INSERTION OF GRAFT TO PERICARDIUM  12/4/2023    Procedure: INSERTION, GRAFT, PERICARDIUM;  Surgeon: Yuri Washington MD;  Location: [facility] OR 2ND FLR;  Service: Cardiovascular;;    INSERTION OF INTRA-AORTIC BALLOON ASSIST DEVICE Right 11/21/2023    Procedure: INSERTION, INTRA-AORTIC BALLOON PUMP;  Surgeon: Finn Cohn MD;  Location: [facility] CATH LAB;  Service: Cardiology;  Laterality: Right;    LEFT VENTRICULAR ASSIST DEVICE Left 12/1/2023    Procedure: INSERTION-LEFT VENTRICULAR ASSIST DEVICE;  Surgeon: Yuri Washington MD;  Location: [facility] OR [facility] FLR;  Service: Cardiovascular;  Laterality: Left;  REDO STERNOTOMY - REDO SAW NEEDED FOR CASE    LYSIS OF ADHESIONS  12/1/2023    Procedure: LYSIS, ADHESIONS;  Surgeon: Yuri Washington MD;  [facility];  Service: Cardiovascular;;    PLACEMENT OF SWAN ROLANDO CATHETER WITH IMAGING GUIDANCE  11/20/2023    Procedure: INSERTION, CATHETER, SWAN-ROLANDO, WITH IMAGING GUIDANCE;  Surgeon: Sajan Hurley MD;  Location: [facility] CATH LAB;  Service: Cardiology;;    REMOVAL OF TUNNELED CENTRAL VENOUS CATHETER (CVC) N/A 3/1/2024    Procedure: REMOVAL, CATHETER, CENTRAL VENOUS, TUNNELED;  Surgeon: Seble Aguilar MD;  Location: [facility] CATH LAB;  Service: Interventional Nephrology;  Laterality: N/A;    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 12/1/2023    Procedure: REPAIR, ANEURYSM, ARTERY, FEMORAL;  Surgeon: Yuri Washington MD;  Location: [facility] OR 2ND FLR;  Service: Cardiovascular;  Laterality: Right;  Right Femoral Artery Repair    RIGHT HEART CATHETERIZATION Right 10/10/2023    Procedure: INSERTION, CATHETER, RIGHT  HEART;  Surgeon: Bin Ganhdi MD;  Location: [facility] CATH LAB;  Service: Cardiology;  Laterality: Right;    RIGHT HEART CATHETERIZATION Right 10/13/2023    Procedure: INSERTION, CATHETER, RIGHT HEART;  Surgeon: Walter Mcintyre MD;  Location: [facility] CATH LAB;  Service: Cardiology;  Laterality: Right;    RIGHT HEART CATHETERIZATION  11/13/2023    RIGHT HEART CATHETERIZATION Right 11/13/2023    Procedure: INSERTION, CATHETER, RIGHT HEART;  Surgeon: Juventino Bermudez Jr., MD;  Location: [facility] CATH LAB;  Service: Cardiology;  Laterality: Right;    RIGHT HEART CATHETERIZATION Right 11/20/2023    Procedure: INSERTION, CATHETER, RIGHT HEART;  Surgeon: Sajan Hurley MD;  Location: [facility] CATH LAB;  Service: Cardiology;  Laterality: Right;    RIGHT HEART CATHETERIZATION Right 1/22/2024    Procedure: INSERTION, CATHETER, RIGHT HEART;  Surgeon: Brayan Ocampo MD;  Location: [facility] CATH LAB;  Service: Cardiology;  Laterality: Right;    STERNAL WOUND CLOSURE N/A 12/4/2023    Procedure: CLOSURE, WOUND, STERNUM;  Surgeon: Yuri Washington MD;  [facility];  Service: Cardiovascular;  Laterality: N/A;    STERNOTOMY N/A 12/1/2023    Procedure: STERNOTOMY, REDO;  Surgeon: Yuri Washington MD;  [facility];  Service: Cardiovascular;  Laterality: N/A;    VALVULOPLASTY, MITRAL VALVE N/A 12/1/2023    Procedure: VALVULOPLASTY, MITRAL VALVE;  Surgeon: Yuri Washington MD;  [facility];  Service: Cardiovascular;  Laterality: N/A;       Review of patient's allergies indicates:  No Known Allergies    Current Facility-Administered Medications   Medication    acetaminophen tablet 1,000 mg    [START ON 7/24/2024] amiodarone tablet 400 mg    atorvastatin tablet 40 mg    furosemide (Lasix) 500 mg in 50 mL infusion (conc: 10 mg/mL)    furosemide injection 80 mg    gabapentin capsule 100 mg    pantoprazole EC tablet 40 mg    senna-docusate 8.6-50 mg per tablet 2 tablet    sevelamer carbonate pwpk 0.8 g     warfarin (COUMADIN) tablet 2.5 mg     Current Outpatient Medications   Medication Sig    acetaminophen (TYLENOL) 500 MG tablet Take 2 tablets (1,000 mg total) by mouth every 8 (eight) hours as needed for Pain.    amiodarone (PACERONE) 400 MG tablet Take 1 tablet (400 mg total) by mouth once daily.    amoxicillin (AMOXIL) 500 MG capsule Take 1 capsule (500 mg total) by mouth once daily.    atorvastatin (LIPITOR) 40 MG tablet Take 1 tablet (40 mg total) by mouth every evening.    bumetanide (BUMEX) 2 MG tablet Take 1 tablet (2 mg total) by mouth once daily.    gabapentin (NEURONTIN) 100 MG capsule Take 1 capsule (100 mg total) by mouth 2 (two) times daily.    insulin aspart U-100 (NOVOLOG) 100 unit/mL (3 mL) InPn pen Inject 0-10 Units into the skin before meals and at bedtime as needed (Hyperglycemia).    midodrine (PROAMATINE) 10 MG tablet Take 1 tablet (10 mg total) by mouth every 8 (eight) hours.    omega-3 acid ethyl esters (LOVAZA) 1 gram capsule Take 2 capsules (2 g total) by mouth 2 (two) times daily.    pantoprazole (PROTONIX) 40 MG tablet Take 1 tablet (40 mg total) by mouth once daily.    pulse oximeter (PULSE OXIMETER) device by Apply Externally route 2 (two) times a day. Use twice daily at 8 AM and 3 PM and [facility].    senna-docusate 8.6-50 mg (PERICOLACE) 8.6-50 mg per tablet Take 2 tablets by mouth daily as needed for Constipation.    sevelamer carbonate (RENVELA) 0.8 gram PwPk Take 1 packet (0.8 g total) by mouth 3 (three) times daily with meals.    sodium bicarbonate 650 MG tablet Take 1 tablet (650 mg total) by mouth 3 (three) times daily.    traZODone (DESYREL) 50 MG tablet Take 0.5 tablets (25 mg total) by mouth every evening.    venlafaxine (EFFEXOR) 37.5 MG Tab Take 1 tablet (37.5 mg total) by mouth once daily.    vitamin D (VITAMIN D3) 1000 units Tab Take 1 tablet (1,000 Units total) by mouth once daily.    warfarin (COUMADIN) 2.5 MG tablet Take 1 tablet (2.5 mg  total) by mouth Daily.     Family History    None       Tobacco Use    Smoking status: Former     Current packs/day: 0.50     Types: Cigarettes    Smokeless tobacco: Not on file   Substance and Sexual Activity    Alcohol use: Yes     Comment: rarely    Drug use: No    Sexual activity: Not Currently     Partners: Female     Review of Systems   Constitutional:  Positive for fatigue. Negative for chills and fever.   HENT:  Negative for congestion.    Eyes:  Negative for visual disturbance.   Respiratory:  Positive for shortness of breath. Negative for cough and wheezing.    Gastrointestinal:  Positive for abdominal distention. Negative for abdominal pain, constipation, diarrhea, nausea and vomiting.   Genitourinary:  Negative for difficulty urinating.   Musculoskeletal:  Negative for back pain and neck pain.   Skin:  Negative for pallor.   Neurological:  Positive for weakness (generalized). Negative for dizziness.     Objective:     Vital Signs (Most Recent):  Temp: 99.1 °F (37.3 °C) (07/23/24 1114)  Pulse: 91 (07/23/24 1114)  Resp: 20 (07/23/24 1114)  BP: (!) 108/0 (07/23/24 1400)  SpO2: 96 % (07/23/24 1114) Vital Signs (24h Range):  Temp:  [99.1 °F (37.3 °C)] 99.1 °F (37.3 °C)  Pulse:  [91] 91  Resp:  [20] 20  SpO2:  [96 %] 96 %  BP: (108-110)/(0-58) 108/0     Patient Vitals for the past 72 hrs (Last 3 readings):   Weight   07/23/24 1114 83.5 kg (184 lb)     Body mass index is 24.28 kg/m².    No intake or output data in the 24 hours ending 07/23/24 1440       Physical Exam  Vitals and nursing note reviewed.   HENT:      Head: Normocephalic.      Nose: Nose normal.   Eyes:      Conjunctiva/sclera: Conjunctivae normal.   Cardiovascular:      Rate and Rhythm: Normal rate and regular rhythm.      Comments: VAD hum  Pulmonary:      Effort: Pulmonary effort is normal.   Abdominal:      General: Abdomen is flat. There is distension.   Musculoskeletal:      Cervical back: Normal range of motion.      Right lower leg: Edema  "present.      Left lower leg: Edema present.   Skin:     General: Skin is warm.      Capillary Refill: Capillary refill takes less than 2 seconds.   Neurological:      General: No focal deficit present.      Mental Status: He is alert.   Psychiatric:         Mood and Affect: Mood normal.         Behavior: Behavior normal.         Thought Content: Thought content normal.         Judgment: Judgment normal.            Significant Labs:  CBC:  Recent Labs   Lab 07/23/24  1135   WBC 6.65   RBC 2.43*   HGB 6.7*   HCT 22.3*      MCV 92   MCH 27.6   MCHC 30.0*     BNP:  Recent Labs   Lab 07/23/24  1135   BNP 1,665*     CMP:  Recent Labs   Lab 07/23/24  1135   *   CALCIUM 7.9*   ALBUMIN 2.1*   PROT 6.9      K 3.2*   CO2 23      BUN 27*   CREATININE 3.6*   ALKPHOS 172*   ALT 26   AST 22   BILITOT 0.2      Coagulation:   Recent Labs   Lab 07/19/24  0736 07/22/24  0715 07/23/24  1327   INR 1.7* 1.8* 1.9*     LDH:  No results for input(s): "LDH" in the last 72 hours.  Microbiology:  Microbiology Results (last 7 days)       ** No results found for the last 168 hours. **            I have reviewed all pertinent labs within the past 24 hours.    Diagnostic Results:  I have reviewed all pertinent imaging results/findings within the past 24 hours.    Assessment/Plan:     * Acute decompensated heart failure  Discharged from rehab on 7/12 with significant volume retention since. Pt reports weight gain, SOB, abdominal distension, and LE edema. Weight at discharge 161 lb, presented at 184 lb on admission  -IV Lasix gtt at 20 mg/hr with 80 mg IVP Lasix   -Nephrology consulted, plan for HD tonight and tomorrow    Acute renal failure superimposed on stage 4 chronic kidney disease  -Will need HD tonight and tomorrow  -Nephrology consulted, appreciate their assistance     LVAD (left ventricular assist device) present  -HeartMate 3 Implanted  12/1/2023  as DT  -Boost Coumadin, Goal INR 2.0-3.0 . Subtherapeutic today. "   -Antiplatelets Not on ASA  -LDH is stable overall today. Will continue to monitor daily.  -Speed set at 5000, LSL 4600 rpm  -Interrogation notable for no events  -Not listed for OHTx        Procedure: Device Interrogation Including analysis of device parameters  Current Settings: Ventricular Assist Device  Review of device function is stable/unstable stable        7/23/2024     2:01 PM 7/1/2024     7:45 AM 7/1/2024     4:16 AM 6/30/2024    11:35 PM 6/30/2024     8:13 PM 6/30/2024     4:48 PM 6/30/2024     2:58 PM   TXP LVAD INTERROGATIONS   Type HeartMate3 HeartMate3 HeartMate3 HeartMate3 HeartMate3 HeartMate3 HeartMate3   Flow 3.5 3.9 4.3 4 3.7 3.5 4.4   Speed 5000 5000 5050 5000 5050 5000 5000   PI 6.1 4.7 3.7 4 6 6.6 4.1   Power ([place]) 3.4 3.4 3.4 3.4 3.4 3.4 3.4   LSL  4600 4600 4600 4600 4600 4600   Low Flow Alarm   none none none     High Power Alarm   none none none     Pulsatility Intermittent pulse Intermittent pulse Pulse Pulse Intermittent pulse               Vero Lozada PA-C  Heart Transplant  [facility] - Emergency Dept

## 2024-07-23 NOTE — ED PROVIDER NOTES
Encounter Date: 7/23/2024       History     Chief Complaint   Patient presents with    Edema     LVAD coord notified lucinda Whitehead and aware pt has emergency bag     64-year-old male with history of CKD stage 5 (receives hemodialysis), CHF, CAD, DM, s/p LVAD and history as stated below who presents to the ED for chief complaint of increased edema.  Spouse is at bedside.  Patient has been having increased edema in his face, abdomen, and bilateral lower extremities over the last 1-2 weeks.  Patient also endorses some difficulty breathing.  Patient takes Bumex and receives dialysis, but has had little improvement.  Patient denies any chest pain, fevers, abdominal pain, nausea, or vomiting.  He denies any LVAD alarms.    The history is provided by the patient and the spouse. No  was used.     Review of patient's allergies indicates:  No Known Allergies  Past Medical History:   Diagnosis Date    Aspiration pneumonia of both lungs 06/17/2024    CAD (coronary artery disease)     CHF (congestive heart failure)     Delirium 06/16/2024    Diabetes mellitus     HFrEF (heart failure with reduced ejection fraction)     Hypoglycemia 06/12/2024    ICD (implantable cardioverter-defibrillator) in place     MI, old     Type 2 diabetes mellitus without complication, without long-term current use of insulin 10/07/2023     Past Surgical History:   Procedure Laterality Date    ANGIOPLASTY-VENOUS ARTERY Right 12/1/2023    Procedure: ANGIOPLASTY-VENOUS ARTERY, RIGHT FEMORAL;  Surgeon: Yuri Washington MD;  Location: [facility] OR [address] [place] [address];  Service: Cardiovascular;  Laterality: Right;    AORTIC VALVULOPLASTY N/A 12/1/2023    Procedure: REPAIR, AORTIC VALVE;  Surgeon: Yuri Washington MD;  Location: [facility] OR [address] [place] [address];  Service: Cardiovascular;  Laterality: N/A;    CARDIAC SURGERY      CLOSURE N/A 12/1/2023    Procedure: CLOSURE, TEMPORARY;  Surgeon: Yuri Washington MD;  Location: [facility] OR [address] [place] [address];  Service: Cardiovascular;   Laterality: N/A;    DRAINAGE OF PLEURAL EFFUSION  12/4/2023    Procedure: DRAINAGE, PLEURAL EFFUSION;  Surgeon: Yuri Washington MD;  [facility];  Service: Cardiovascular;;    INSERTION OF GRAFT TO PERICARDIUM  12/4/2023    Procedure: INSERTION, GRAFT, PERICARDIUM;  Surgeon: Yuri Washington MD;  [facility];  Service: Cardiovascular;;    INSERTION OF INTRA-AORTIC BALLOON ASSIST DEVICE Right 11/21/2023    Procedure: INSERTION, INTRA-AORTIC BALLOON PUMP;  Surgeon: Finn Cohn MD;  Location: [facility] CATH LAB;  Service: Cardiology;  Laterality: Right;    LEFT VENTRICULAR ASSIST DEVICE Left 12/1/2023    Procedure: INSERTION-LEFT VENTRICULAR ASSIST DEVICE;  Surgeon: Yuri Washington MD;  [facility];  Service: Cardiovascular;  Laterality: Left;  REDO STERNOTOMY - REDO SAW NEEDED FOR CASE    LYSIS OF ADHESIONS  12/1/2023    Procedure: LYSIS, ADHESIONS;  Surgeon: Yuri Washington MD;  [facility];  Service: Cardiovascular;;    PLACEMENT OF SWAN ROLANDO CATHETER WITH IMAGING GUIDANCE  11/20/2023    Procedure: INSERTION, CATHETER, SWAN-ROLANDO, WITH IMAGING GUIDANCE;  Surgeon: Sajan Hurley MD;  Location: [facility] CATH LAB;  Service: Cardiology;;    REMOVAL OF TUNNELED CENTRAL VENOUS CATHETER (CVC) N/A 3/1/2024    Procedure: REMOVAL, CATHETER, CENTRAL VENOUS, TUNNELED;  Surgeon: Seble Aguilar MD;  Location: [facility] CATH LAB;  Service: Interventional Nephrology;  Laterality: N/A;    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 12/1/2023    Procedure: REPAIR, ANEURYSM, ARTERY, FEMORAL;  Surgeon: Yuri Washington MD;  [facility];  Service: Cardiovascular;  Laterality: Right;  Right Femoral Artery Repair    RIGHT HEART CATHETERIZATION Right 10/10/2023    Procedure: INSERTION, CATHETER, RIGHT HEART;  Surgeon: Bin Gandhi MD;  Location: [facility] CATH LAB;  Service: Cardiology;  Laterality: Right;    RIGHT HEART CATHETERIZATION Right 10/13/2023    Procedure:  INSERTION, CATHETER, RIGHT HEART;  Surgeon: Walter Mcintyre MD;  Location: [facility] CATH LAB;  Service: Cardiology;  Laterality: Right;    RIGHT HEART CATHETERIZATION  11/13/2023    RIGHT HEART CATHETERIZATION Right 11/13/2023    Procedure: INSERTION, CATHETER, RIGHT HEART;  Surgeon: Juventino Bermudez Jr., MD;  Location: [facility] CATH LAB;  Service: Cardiology;  Laterality: Right;    RIGHT HEART CATHETERIZATION Right 11/20/2023    Procedure: INSERTION, CATHETER, RIGHT HEART;  Surgeon: Sajan Hurley MD;  Location: [facility] CATH LAB;  Service: Cardiology;  Laterality: Right;    RIGHT HEART CATHETERIZATION Right 1/22/2024    Procedure: INSERTION, CATHETER, RIGHT HEART;  Surgeon: Brayan Ocampo MD;  Location: [facility] CATH LAB;  Service: Cardiology;  Laterality: Right;    STERNAL WOUND CLOSURE N/A 12/4/2023    Procedure: CLOSURE, WOUND, STERNUM;  Surgeon: Yuri Washington MD;  [facility];  Service: Cardiovascular;  Laterality: N/A;    STERNOTOMY N/A 12/1/2023    Procedure: STERNOTOMY, REDO;  Surgeon: Yuri Washington MD;  Location: [facility] OR [facility];  Service: Cardiovascular;  Laterality: N/A;    VALVULOPLASTY, MITRAL VALVE N/A 12/1/2023    Procedure: VALVULOPLASTY, MITRAL VALVE;  Surgeon: Yuri Washington MD;  [facility];  Service: Cardiovascular;  Laterality: N/A;     No family history on file.  Social History     Tobacco Use    Smoking status: Former     Current packs/day: 0.50     Types: Cigarettes   Substance Use Topics    Alcohol use: Yes     Comment: rarely    Drug use: No     Review of Systems    Physical Exam     Initial Vitals [07/23/24 1114]   BP Pulse Resp Temp SpO2   (!) 110/58 91 20 99.1 °F (37.3 °C) 96 %      MAP       --         Physical Exam    Nursing note and vitals reviewed.  Constitutional: No distress.   Patient is laying in bed in no apparent distress   HENT:   Head: Normocephalic.   Eyes: Conjunctivae and EOM are normal. No scleral icterus.   Neck:   Normal range of  motion.  Cardiovascular:            Patient has normal LVAD hum   Pulmonary/Chest: No respiratory distress. He has no wheezes. He has no rhonchi. He has rales. He exhibits no tenderness.   Abdominal: Abdomen is soft. He exhibits no distension and no mass. There is no abdominal tenderness.   LVAD driveline in place. There is no rebound and no guarding.   Musculoskeletal:         General: Edema (Mild BLE edema) present. No tenderness. Normal range of motion.      Cervical back: Normal range of motion.     Neurological: He is alert.   Skin: Skin is warm. Capillary refill takes less than 2 seconds.   Psychiatric: He has a normal mood and affect.         ED Course   Procedures  Labs Reviewed   CBC W/ AUTO DIFFERENTIAL - Abnormal       Result Value    WBC 6.65      RBC 2.43 (*)     Hemoglobin 6.7 (*)     Hematocrit 22.3 (*)     MCV 92      MCH 27.6      MCHC 30.0 (*)     RDW 18.6 (*)     Platelets 305      MPV 9.2      Immature Granulocytes 1.1 (*)     Gran # (ANC) 5.5      Immature Grans (Abs) 0.07 (*)     Lymph # 0.5 (*)     Mono # 0.4      Eos # 0.2      Baso # 0.02      nRBC 0      Gran % 82.2 (*)     Lymph % 7.2 (*)     Mono % 6.0      Eosinophil % 3.2      Basophil % 0.3      Differential Method Automated     COMPREHENSIVE METABOLIC PANEL - Abnormal    Sodium 138      Potassium 3.2 (*)     Chloride 106      CO2 23      Glucose 243 (*)     BUN 27 (*)     Creatinine 3.6 (*)     Calcium 7.9 (*)     Total Protein 6.9      Albumin 2.1 (*)     Total Bilirubin 0.2      Alkaline Phosphatase 172 (*)     AST 22      ALT 26      eGFR 18.3 (*)     Anion Gap 9     B-TYPE NATRIURETIC PEPTIDE - Abnormal    BNP 1,665 (*)    PROTIME-INR - Abnormal    Prothrombin Time 20.3 (*)     INR 1.9 (*)    ISTAT PROCEDURE - Abnormal    POC Glucose 97      POC BUN 70 (*)     POC Creatinine 3.9 (*)     POC Sodium 138      POC Potassium 5.7 (*)     POC Chloride 98      POC TCO2 (MEASURED) 31 (*)     POC Ionized Calcium 1.18      POC Hematocrit 39       Sample JUAN     TROPONIN I    Troponin I 0.018     LACTATE DEHYDROGENASE   TYPE & SCREEN    Group & Rh A POS      Indirect Jimenez NEG      Specimen Outdate 07/26/2024 23:59     ISTAT LACTATE    POC Lactate 1.23      Sample VENOUS      Site Other      Allens Test N/A          ECG Results              EKG 12-lead (Final result)        Collection Time Result Time QRS Duration OHS QTC Calculation    07/23/24 11:17:15 07/23/24 13:48:39 180 447                     Final result by Interface, Lab In [facility] (07/23/24 13:48:44)                   Narrative:    Test Reason : R06.02,    Vent. Rate : 090 BPM     Atrial Rate : 090 BPM     P-R Int : 180 ms          QRS Dur : 180 ms      QT Int : 366 ms       P-R-T Axes : 000 -23 -80 degrees     QTc Int : 447 ms    Normal sinus rhythm  Left bundle branch block  Abnormal ECG  When compared with ECG of 15-LESLYE-2024 02:18,  No significant change was found  Confirmed by Mellissa Piña MD ([id number]) on 7/23/2024 1:48:34 PM    Referred By:             Confirmed By:Mellissa Piña MD                                  Imaging Results              X-Ray Chest AP Portable (Final result)  Result time 07/23/24 14:50:55      Final result by Cate Wilkes MD (07/23/24 14:50:55)                   Impression:      No significant interval change.  The examination is suggestive of mild CHF.      Electronically signed by: Cate Wilkes MD  Date:    07/23/2024  Time:    14:50               Narrative:    EXAMINATION:  XR CHEST AP PORTABLE    CLINICAL HISTORY:  SOB;    TECHNIQUE:  Single frontal view of the chest was performed.    COMPARISON:  06/21/2024    FINDINGS:  Sternotomy wires.  Cardio stimulator device left upper chest, unchanged.  LVAD.  Right central line distal tip in the SVC.    Cardiac silhouette is enlarged.  The pulmonary vascularity is mildly increased centrally.    Mild increased attenuation at the left lung base suggestive of a small left pleural effusion.  No large focal area  of airspace consolidation.  No pneumothorax.  The osseous structures appear within normal limits.                                       Medications   acetaminophen tablet 1,000 mg (has no administration in time range)   amiodarone tablet 400 mg (has no administration in time range)   atorvastatin tablet 40 mg (40 mg Oral Given 7/23/24 2140)   gabapentin capsule 100 mg (100 mg Oral Given 7/23/24 2141)   pantoprazole EC tablet 40 mg (40 mg Oral Given 7/23/24 1548)   sevelamer carbonate pwpk 0.8 g (0.8 g Oral Given 7/23/24 1751)   warfarin (COUMADIN) tablet 2.5 mg (2.5 mg Oral Given 7/23/24 1751)   senna-docusate 8.6-50 mg per tablet 2 tablet (has no administration in time range)   furosemide (Lasix) 500 mg in 50 mL infusion (conc: 10 mg/mL) (20 mg/hr Intravenous New Bag 7/23/24 1601)   trazodone split tablet 25 mg (25 mg Oral Given 7/24/24 0025)   furosemide injection 80 mg (80 mg Intravenous Given 7/23/24 1548)   hydrALAZINE tablet 25 mg (25 mg Oral Given 7/23/24 2141)     Medical Decision Making  64-year-old male who presents with increased generalized edema.  Vitals are WNL.  On exam, he has rales in bilateral lung fields and BLE edema.  Normal LVAD hum with a C/D/I drive line site.  Please see physical exam findings above for additional details.  Differential diagnoses include but are not limited to CHF, fluid overload, electrolyte abnormality, ACS.  Low suspicion of LVAD mechanical problem since patient has normal LVAD hum and no alarms have gone off at home.  LVAD coordinator has been notified.  Consulted and discussed patient with Cardiology team.  Please see ED course for additional details.    Patient will be admitted to the hospital by cardiology team in the setting of his CHF, increased edema, and dyspnea.    Amount and/or Complexity of Data Reviewed  Labs: ordered. Decision-making details documented in ED Course.  Radiology: ordered. Decision-making details documented in ED Course.  ECG/medicine tests:  ordered and independent interpretation performed. Decision-making details documented in ED Course.    Risk  Decision regarding hospitalization.              Attending Attestation:   Physician Attestation Statement for Resident:  As the supervising MD   Physician Attestation Statement: I have personally seen and examined this patient.   I agree with the above history.  -:   As the supervising MD I agree with the above PE.     As the supervising MD I agree with the above treatment, course, plan, and disposition.                    I have reviewed and concur with the resident's history, physical, assessment, and plan.  I have personally interviewed and examined the patient at bedside.   I did supervise any and all procedures and was present for any critical portion, and was always immediately available for help and as a resource.     The above history physical, review of symptoms, HPI and physical exam reflect my independent interpretation and evaluation.    Complexity: High Risk    Final diagnoses:  [R06.02] Shortness of breath  [I50.9] Acute decompensated heart failure     Rubio Blackwell DO, LINDY  Emergency Staff Physician   Dept of Emergency Medicine   [facility]  Spectralink: [id number]        Disclaimer: This note has been generated using voice-recognition software. There may be typographical errors that have been missed during proof-reading.            ED Course as of 07/24/24 0113   Tue Jul 23, 2024   1120 EKG 12-lead  No STEMI. [LP]   1203 EKG 12-lead  EKG shows normal sinus rhythm with LVAD artifact, rate of 90 beats per minute, and no STEMI [MD]   1255 Potassium(!): 3.2  Hypokalemia [MD]   1300 Hemoglobin(!): 6.7 [MD]   1309 Consulted and discussed patient with Cardiology team.  Cards team will come to assess patient at bedside. [MD]   1340 BNP(!): 1,665 [MD]   1410 Plan for patient to be admitted to Hospital by cardiology team. [MD]   1500 CXR shows small pleural effusion of the left lung.  No  significant changes from prior scan. [MD]      ED Course User Index  [LP] Beto Yu III, MD  [MD] Haseeb Castillo MD                           Clinical Impression:  Final diagnoses:  [R06.02] Shortness of breath  [I50.9] Acute decompensated heart failure          ED Disposition Condition    Admit                 Haseeb Castillo MD  Resident  07/24/24 0113       Rubio Blackwell DO  07/24/24 0739

## 2024-07-23 NOTE — FIRST PROVIDER EVALUATION
"  Emergency Department First Provider Evaluation    Radha Abbott   64 y.o. male   [id number]      7/23/2024        Medical screening examination initiated prior to patient room assignment.        History of present illness:  LVAD patient presenting with peripheral edema and weight gain.  Denies chest pain but is having shortness of breath.    Vitals:    07/23/24 1114   BP: (!) 110/58   Pulse: 91   Resp: 20   Temp: 99.1 °F (37.3 °C)   TempSrc: Oral   SpO2: 96%   Weight: 83.5 kg (184 lb)   Height: 6' 1" (1.854 m)       Pertinent physical examination findings:  No respiratory distress.  Awake and alert.  Normal mentation.    Brief workup plan:  Triage nurses contacted the LVAD coordinator.  Ordering EKG, chest x-ray, basic labs, cardiac labs.          This brief and focused assessment was performed to initiate workup and treatment. Follow-up of these results will be performed by the assigned treatment team. Additional data collection, labs, imaging studies, and treatments may be needed for completion of this patient encounter.  "

## 2024-07-23 NOTE — HPI
Mr. Radha Abbott is a 65 yo male with stage D HFrEF, ICMP who underwent HM3 placement as DT (12/1/23) w/ a hospital stay complicated by vasoplegia (requiring Giaprezza), debility, malnutrition/impaired swallowing, and renal failure requiring HD w/ recent discharge from rehab on 7/12. Presented to the ED due to worsening weakness, SOB, and LE edema. Pt reports feeling volume overloaded/retaining fluid and has been sleeping in a recliner. Since his discharge from rehab, he was told that he could only remove 1 L of fluid during each HD session, last session was yesterday (MWF). He has also not been taking his  home PO Bumex. Weight at last discharge was 161 lb, weight today is 184 lb. Pt report still making a small amount of urine.

## 2024-07-23 NOTE — ASSESSMENT & PLAN NOTE
Discharged from rehab on 7/12 with significant volume retention since. Pt reports weight gain, SOB, abdominal distension, and LE edema. Weight at discharge 161 lb, presented at 184 lb on admission  -IV Lasix gtt at 20 mg/hr with 80 mg IVP Lasix   -Nephrology consulted, plan for HD tonight and tomorrow

## 2024-07-23 NOTE — SUBJECTIVE & OBJECTIVE
Past Medical History:   Diagnosis Date    Aspiration pneumonia of both lungs 06/17/2024    CAD (coronary artery disease)     CHF (congestive heart failure)     Delirium 06/16/2024    Diabetes mellitus     HFrEF (heart failure with reduced ejection fraction)     Hypoglycemia 06/12/2024    ICD (implantable cardioverter-defibrillator) in place     MI, old     Type 2 diabetes mellitus without complication, without long-term current use of insulin 10/07/2023       Past Surgical History:   Procedure Laterality Date    ANGIOPLASTY-VENOUS ARTERY Right 12/1/2023    Procedure: ANGIOPLASTY-VENOUS ARTERY, RIGHT FEMORAL;  Surgeon: Yuri Washington MD;  [facility];  Service: Cardiovascular;  Laterality: Right;    AORTIC VALVULOPLASTY N/A 12/1/2023    Procedure: REPAIR, AORTIC VALVE;  Surgeon: Yuri aWshington MD;  [facility];  Service: Cardiovascular;  Laterality: N/A;    CARDIAC SURGERY      CLOSURE N/A 12/1/2023    Procedure: CLOSURE, TEMPORARY;  Surgeon: Yuri Washington MD;  [facility];  Service: Cardiovascular;  Laterality: N/A;    DRAINAGE OF PLEURAL EFFUSION  12/4/2023    Procedure: DRAINAGE, PLEURAL EFFUSION;  Surgeon: Yuri Washington MD;  [facility];  Service: Cardiovascular;;    INSERTION OF GRAFT TO PERICARDIUM  12/4/2023    Procedure: INSERTION, GRAFT, PERICARDIUM;  Surgeon: Yuri Washington MD;  [facility];  Service: Cardiovascular;;    INSERTION OF INTRA-AORTIC BALLOON ASSIST DEVICE Right 11/21/2023    Procedure: INSERTION, INTRA-AORTIC BALLOON PUMP;  Surgeon: Finn Cohn MD;  Location: [facility] CATH LAB;  Service: Cardiology;  Laterality: Right;    LEFT VENTRICULAR ASSIST DEVICE Left 12/1/2023    Procedure: INSERTION-LEFT VENTRICULAR ASSIST DEVICE;  Surgeon: Yuri Washington MD;  Location: [facility] OR [address] [place] [address];  Service: Cardiovascular;  Laterality: Left;  REDO STERNOTOMY - REDO SAW NEEDED FOR CASE    LYSIS OF ADHESIONS  12/1/2023    Procedure:  LYSIS, ADHESIONS;  Surgeon: Yuri Washington MD;  [facility];  Service: Cardiovascular;;    PLACEMENT OF SWAN ROLANDO CATHETER WITH IMAGING GUIDANCE  11/20/2023    Procedure: INSERTION, CATHETER, SWAN-ROLANDO, WITH IMAGING GUIDANCE;  Surgeon: Sajan Hurley MD;  Location: [facility] CATH LAB;  Service: Cardiology;;    REMOVAL OF TUNNELED CENTRAL VENOUS CATHETER (CVC) N/A 3/1/2024    Procedure: REMOVAL, CATHETER, CENTRAL VENOUS, TUNNELED;  Surgeon: Seble Aguilar MD;  Location: [facility] CATH LAB;  Service: Interventional Nephrology;  Laterality: N/A;    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 12/1/2023    Procedure: REPAIR, ANEURYSM, ARTERY, FEMORAL;  Surgeon: Yuri Washington MD;  [facility];  Service: Cardiovascular;  Laterality: Right;  Right Femoral Artery Repair    RIGHT HEART CATHETERIZATION Right 10/10/2023    Procedure: INSERTION, CATHETER, RIGHT HEART;  Surgeon: Bin Gandhi MD;  Location: [facility] CATH LAB;  Service: Cardiology;  Laterality: Right;    RIGHT HEART CATHETERIZATION Right 10/13/2023    Procedure: INSERTION, CATHETER, RIGHT HEART;  Surgeon: Walter Mcintyre MD;  Location: [facility] CATH LAB;  Service: Cardiology;  Laterality: Right;    RIGHT HEART CATHETERIZATION  11/13/2023    RIGHT HEART CATHETERIZATION Right 11/13/2023    Procedure: INSERTION, CATHETER, RIGHT HEART;  Surgeon: Juventino Bermudez Jr., MD;  Location: [facility] CATH LAB;  Service: Cardiology;  Laterality: Right;    RIGHT HEART CATHETERIZATION Right 11/20/2023    Procedure: INSERTION, CATHETER, RIGHT HEART;  Surgeon: Sajan Hurley MD;  Location: [facility] CATH LAB;  Service: Cardiology;  Laterality: Right;    RIGHT HEART CATHETERIZATION Right 1/22/2024    Procedure: INSERTION, CATHETER, RIGHT HEART;  Surgeon: Brayan Ocampo MD;  Location: [facility] CATH LAB;  Service: Cardiology;  Laterality: Right;    STERNAL WOUND CLOSURE N/A 12/4/2023    Procedure: CLOSURE, WOUND, STERNUM;  Surgeon: Yuri Washington MD;   [facility]: [facility];  Service: Cardiovascular;  Laterality: N/A;    STERNOTOMY N/A 12/1/2023    Procedure: STERNOTOMY, REDO;  Surgeon: Yuri Washington MD;  [facility];  Service: Cardiovascular;  Laterality: N/A;    VALVULOPLASTY, MITRAL VALVE N/A 12/1/2023    Procedure: VALVULOPLASTY, MITRAL VALVE;  Surgeon: Yuri Washington MD;  [facility];  Service: Cardiovascular;  Laterality: N/A;       Review of patient's allergies indicates:  No Known Allergies  Current Facility-Administered Medications   Medication Frequency    acetaminophen tablet 1,000 mg Q8H PRN    [START ON 7/24/2024] amiodarone tablet 400 mg Daily    atorvastatin tablet 40 mg QHS    furosemide (Lasix) 500 mg in 50 mL infusion (conc: 10 mg/mL) Continuous    furosemide injection 80 mg Once    gabapentin capsule 100 mg BID    pantoprazole EC tablet 40 mg Daily    senna-docusate 8.6-50 mg per tablet 2 tablet Daily PRN    sevelamer carbonate pwpk 0.8 g TID WM    warfarin (COUMADIN) tablet 2.5 mg Daily     Current Outpatient Medications   Medication    acetaminophen (TYLENOL) 500 MG tablet    amiodarone (PACERONE) 400 MG tablet    amoxicillin (AMOXIL) 500 MG capsule    atorvastatin (LIPITOR) 40 MG tablet    bumetanide (BUMEX) 2 MG tablet    gabapentin (NEURONTIN) 100 MG capsule    insulin aspart U-100 (NOVOLOG) 100 unit/mL (3 mL) InPn pen    midodrine (PROAMATINE) 10 MG tablet    omega-3 acid ethyl esters (LOVAZA) 1 gram capsule    pantoprazole (PROTONIX) 40 MG tablet    pulse oximeter (PULSE OXIMETER) device    senna-docusate 8.6-50 mg (PERICOLACE) 8.6-50 mg per tablet    sevelamer carbonate (RENVELA) 0.8 gram PwPk    sodium bicarbonate 650 MG tablet    traZODone (DESYREL) 50 MG tablet    venlafaxine (EFFEXOR) 37.5 MG Tab    vitamin D (VITAMIN D3) 1000 units Tab    warfarin (COUMADIN) 2.5 MG tablet     Family History    None       Tobacco Use    Smoking status: Former     Current packs/day: 0.50     Types: Cigarettes     Smokeless tobacco: Not on file   Substance and Sexual Activity    Alcohol use: Yes     Comment: rarely    Drug use: No    Sexual activity: Not Currently     Partners: Female     Review of Systems   Constitutional: Negative.    HENT: Negative.     Respiratory:  Positive for shortness of breath.    Cardiovascular:  Negative for chest pain.   Gastrointestinal: Negative.    Genitourinary:  Positive for decreased urine volume.   Musculoskeletal: Negative.    Neurological: Negative.    Psychiatric/Behavioral: Negative.       Objective:     Vital Signs (Most Recent):  Temp: 99.1 °F (37.3 °C) (24 1114)  Pulse: 91 (24 1114)  Resp: 20 (24 1114)  BP: (!) 108/0 (24 1400)  SpO2: 96 % (24 1114) Vital Signs (24h Range):  Temp:  [99.1 °F (37.3 °C)] 99.1 °F (37.3 °C)  Pulse:  [91] 91  Resp:  [20] 20  SpO2:  [96 %] 96 %  BP: (108-110)/(0-58) 108/0     Weight: 83.5 kg (184 lb) (24 1114)  Body mass index is 24.28 kg/m².  Body surface area is 2.07 meters squared.    No intake/output data recorded.     Physical Exam  Vitals and nursing note reviewed.   Constitutional:       General: He is not in acute distress.  Cardiovascular:      Comments: LVAD hum present. + JVD.   Pulmonary:      Effort: Pulmonary effort is normal.      Breath sounds: Examination of the right-lower field reveals decreased breath sounds. Examination of the left-lower field reveals decreased breath sounds. Decreased breath sounds present.   Chest:      Comments: R CVC present  Musculoskeletal:      Right lower le+ Edema present.      Left lower le+ Edema present.   Skin:     General: Skin is warm and dry.   Neurological:      Mental Status: He is alert and oriented to person, place, and time.   Psychiatric:         Mood and Affect: Mood normal.          Significant Labs:  CBC:   Recent Labs   Lab 24  1135   WBC 6.65   RBC 2.43*   HGB 6.7*   HCT 22.3*      MCV 92   MCH 27.6   MCHC 30.0*     CMP:   Recent Labs    Lab 07/23/24  1135   *   CALCIUM 7.9*   ALBUMIN 2.1*   PROT 6.9      K 3.2*   CO2 23      BUN 27*   CREATININE 3.6*   ALKPHOS 172*   ALT 26   AST 22   BILITOT 0.2     All labs within the past 24 hours have been reviewed.

## 2024-07-24 ENCOUNTER — CLINICAL SUPPORT (OUTPATIENT)
Dept: ENDOSCOPY | Facility: HOSPITAL | Age: 64
End: 2024-07-24
Attending: INTERNAL MEDICINE
Payer: MEDICAID

## 2024-07-24 ENCOUNTER — TELEPHONE (OUTPATIENT)
Dept: ENDOSCOPY | Facility: HOSPITAL | Age: 64
End: 2024-07-24

## 2024-07-24 ENCOUNTER — TELEPHONE (OUTPATIENT)
Dept: TRANSPLANT | Facility: CLINIC | Age: 64
End: 2024-07-24
Payer: MEDICAID

## 2024-07-24 DIAGNOSIS — R78.81 BACTEREMIA: ICD-10-CM

## 2024-07-24 PROBLEM — D63.8 ANEMIA OF CHRONIC DISEASE: Status: ACTIVE | Noted: 2024-04-26

## 2024-07-24 LAB
ALBUMIN SERPL BCP-MCNC: 2.1 G/DL (ref 3.5–5.2)
ALP SERPL-CCNC: 156 U/L (ref 55–135)
ALT SERPL W/O P-5'-P-CCNC: 25 U/L (ref 10–44)
ANION GAP SERPL CALC-SCNC: 10 MMOL/L (ref 8–16)
ANISOCYTOSIS BLD QL SMEAR: SLIGHT
APTT PPP: 42.1 SEC (ref 21–32)
AST SERPL-CCNC: 20 U/L (ref 10–40)
BASOPHILS # BLD AUTO: ABNORMAL K/UL (ref 0–0.2)
BASOPHILS NFR BLD: 0 % (ref 0–1.9)
BILIRUB DIRECT SERPL-MCNC: 0.1 MG/DL (ref 0.1–0.3)
BILIRUB SERPL-MCNC: 0.3 MG/DL (ref 0.1–1)
BLD PROD TYP BPU: NORMAL
BLOOD UNIT EXPIRATION DATE: NORMAL
BLOOD UNIT TYPE CODE: 6200
BLOOD UNIT TYPE: NORMAL
BNP SERPL-MCNC: 1648 PG/ML (ref 0–99)
BUN SERPL-MCNC: 29 MG/DL (ref 8–23)
CALCIUM SERPL-MCNC: 8.5 MG/DL (ref 8.7–10.5)
CHLORIDE SERPL-SCNC: 107 MMOL/L (ref 95–110)
CO2 SERPL-SCNC: 21 MMOL/L (ref 23–29)
CODING SYSTEM: NORMAL
CREAT SERPL-MCNC: 3.8 MG/DL (ref 0.5–1.4)
CROSSMATCH INTERPRETATION: NORMAL
CRP SERPL-MCNC: 110.4 MG/L (ref 0–8.2)
DACRYOCYTES BLD QL SMEAR: ABNORMAL
DIFFERENTIAL METHOD BLD: ABNORMAL
DISPENSE STATUS: NORMAL
DOHLE BOD BLD QL SMEAR: PRESENT
EOSINOPHIL # BLD AUTO: ABNORMAL K/UL (ref 0–0.5)
EOSINOPHIL NFR BLD: 1 % (ref 0–8)
ERYTHROCYTE [DISTWIDTH] IN BLOOD BY AUTOMATED COUNT: 18.5 % (ref 11.5–14.5)
EST. GFR  (NO RACE VARIABLE): 17.1 ML/MIN/1.73 M^2
GLUCOSE SERPL-MCNC: 141 MG/DL (ref 70–110)
HCT VFR BLD AUTO: 23.1 % (ref 40–54)
HGB BLD-MCNC: 7 G/DL (ref 14–18)
HYPOCHROMIA BLD QL SMEAR: ABNORMAL
IMM GRANULOCYTES # BLD AUTO: ABNORMAL K/UL (ref 0–0.04)
IMM GRANULOCYTES NFR BLD AUTO: ABNORMAL % (ref 0–0.5)
INR PPP: 2.1 (ref 0.8–1.2)
LDH SERPL L TO P-CCNC: 277 U/L (ref 110–260)
LYMPHOCYTES # BLD AUTO: ABNORMAL K/UL (ref 1–4.8)
LYMPHOCYTES NFR BLD: 9 % (ref 18–48)
MAGNESIUM SERPL-MCNC: 1.6 MG/DL (ref 1.6–2.6)
MCH RBC QN AUTO: 26.9 PG (ref 27–31)
MCHC RBC AUTO-ENTMCNC: 30.3 G/DL (ref 32–36)
MCV RBC AUTO: 89 FL (ref 82–98)
MONOCYTES # BLD AUTO: ABNORMAL K/UL (ref 0.3–1)
MONOCYTES NFR BLD: 3 % (ref 4–15)
NEUTROPHILS NFR BLD: 86 % (ref 38–73)
NEUTS BAND NFR BLD MANUAL: 1 %
NRBC BLD-RTO: 0 /100 WBC
NUM UNITS TRANS PACKED RBC: NORMAL
OVALOCYTES BLD QL SMEAR: ABNORMAL
PHOSPHATE SERPL-MCNC: 2.9 MG/DL (ref 2.7–4.5)
PLATELET # BLD AUTO: 322 K/UL (ref 150–450)
PLATELET BLD QL SMEAR: ABNORMAL
PMV BLD AUTO: 9.3 FL (ref 9.2–12.9)
POIKILOCYTOSIS BLD QL SMEAR: SLIGHT
POLYCHROMASIA BLD QL SMEAR: ABNORMAL
POTASSIUM SERPL-SCNC: 3.2 MMOL/L (ref 3.5–5.1)
PREALB SERPL-MCNC: 16 MG/DL (ref 20–43)
PROT SERPL-MCNC: 6.8 G/DL (ref 6–8.4)
PROTHROMBIN TIME: 22 SEC (ref 9–12.5)
RBC # BLD AUTO: 2.6 M/UL (ref 4.6–6.2)
SODIUM SERPL-SCNC: 138 MMOL/L (ref 136–145)
SPHEROCYTES BLD QL SMEAR: ABNORMAL
TOXIC GRANULES BLD QL SMEAR: PRESENT
WBC # BLD AUTO: 10.7 K/UL (ref 3.9–12.7)

## 2024-07-24 PROCEDURE — 97165 OT EVAL LOW COMPLEX 30 MIN: CPT

## 2024-07-24 PROCEDURE — 36415 COLL VENOUS BLD VENIPUNCTURE: CPT | Performed by: STUDENT IN AN ORGANIZED HEALTH CARE EDUCATION/TRAINING PROGRAM

## 2024-07-24 PROCEDURE — 25000003 PHARM REV CODE 250: Performed by: INTERNAL MEDICINE

## 2024-07-24 PROCEDURE — 85610 PROTHROMBIN TIME: CPT | Performed by: STUDENT IN AN ORGANIZED HEALTH CARE EDUCATION/TRAINING PROGRAM

## 2024-07-24 PROCEDURE — 85007 BL SMEAR W/DIFF WBC COUNT: CPT | Performed by: STUDENT IN AN ORGANIZED HEALTH CARE EDUCATION/TRAINING PROGRAM

## 2024-07-24 PROCEDURE — 80100014 HC HEMODIALYSIS 1:1

## 2024-07-24 PROCEDURE — 85027 COMPLETE CBC AUTOMATED: CPT | Performed by: STUDENT IN AN ORGANIZED HEALTH CARE EDUCATION/TRAINING PROGRAM

## 2024-07-24 PROCEDURE — 80048 BASIC METABOLIC PNL TOTAL CA: CPT | Performed by: STUDENT IN AN ORGANIZED HEALTH CARE EDUCATION/TRAINING PROGRAM

## 2024-07-24 PROCEDURE — 83880 ASSAY OF NATRIURETIC PEPTIDE: CPT | Performed by: STUDENT IN AN ORGANIZED HEALTH CARE EDUCATION/TRAINING PROGRAM

## 2024-07-24 PROCEDURE — 25000003 PHARM REV CODE 250: Performed by: PHYSICIAN ASSISTANT

## 2024-07-24 PROCEDURE — 63600175 PHARM REV CODE 636 W HCPCS: Performed by: PHYSICIAN ASSISTANT

## 2024-07-24 PROCEDURE — 97116 GAIT TRAINING THERAPY: CPT

## 2024-07-24 PROCEDURE — 83735 ASSAY OF MAGNESIUM: CPT | Performed by: STUDENT IN AN ORGANIZED HEALTH CARE EDUCATION/TRAINING PROGRAM

## 2024-07-24 PROCEDURE — 25000003 PHARM REV CODE 250: Performed by: STUDENT IN AN ORGANIZED HEALTH CARE EDUCATION/TRAINING PROGRAM

## 2024-07-24 PROCEDURE — P9016 RBC LEUKOCYTES REDUCED: HCPCS | Performed by: PHYSICIAN ASSISTANT

## 2024-07-24 PROCEDURE — 27000248 HC VAD-ADDITIONAL DAY

## 2024-07-24 PROCEDURE — 86140 C-REACTIVE PROTEIN: CPT | Performed by: STUDENT IN AN ORGANIZED HEALTH CARE EDUCATION/TRAINING PROGRAM

## 2024-07-24 PROCEDURE — 84134 ASSAY OF PREALBUMIN: CPT | Performed by: STUDENT IN AN ORGANIZED HEALTH CARE EDUCATION/TRAINING PROGRAM

## 2024-07-24 PROCEDURE — 85730 THROMBOPLASTIN TIME PARTIAL: CPT | Performed by: STUDENT IN AN ORGANIZED HEALTH CARE EDUCATION/TRAINING PROGRAM

## 2024-07-24 PROCEDURE — 97530 THERAPEUTIC ACTIVITIES: CPT

## 2024-07-24 PROCEDURE — 97161 PT EVAL LOW COMPLEX 20 MIN: CPT

## 2024-07-24 PROCEDURE — 83615 LACTATE (LD) (LDH) ENZYME: CPT | Performed by: STUDENT IN AN ORGANIZED HEALTH CARE EDUCATION/TRAINING PROGRAM

## 2024-07-24 PROCEDURE — 84100 ASSAY OF PHOSPHORUS: CPT | Performed by: STUDENT IN AN ORGANIZED HEALTH CARE EDUCATION/TRAINING PROGRAM

## 2024-07-24 PROCEDURE — 99233 SBSQ HOSP IP/OBS HIGH 50: CPT | Mod: 95,,, | Performed by: PHYSICIAN ASSISTANT

## 2024-07-24 PROCEDURE — 80076 HEPATIC FUNCTION PANEL: CPT | Performed by: STUDENT IN AN ORGANIZED HEALTH CARE EDUCATION/TRAINING PROGRAM

## 2024-07-24 PROCEDURE — 86920 COMPATIBILITY TEST SPIN: CPT | Performed by: PHYSICIAN ASSISTANT

## 2024-07-24 PROCEDURE — 93750 INTERROGATION VAD IN PERSON: CPT | Mod: ,,, | Performed by: INTERNAL MEDICINE

## 2024-07-24 PROCEDURE — 20600001 HC STEP DOWN PRIVATE ROOM

## 2024-07-24 PROCEDURE — 90935 HEMODIALYSIS ONE EVALUATION: CPT | Mod: ,,, | Performed by: NURSE PRACTITIONER

## 2024-07-24 RX ORDER — SODIUM CHLORIDE 9 MG/ML
INJECTION, SOLUTION INTRAVENOUS ONCE
Status: CANCELLED | OUTPATIENT
Start: 2024-07-24 | End: 2024-07-24

## 2024-07-24 RX ORDER — SODIUM CHLORIDE 9 MG/ML
INJECTION, SOLUTION INTRAVENOUS ONCE
Status: CANCELLED | OUTPATIENT
Start: 2024-07-25

## 2024-07-24 RX ORDER — HYDRALAZINE HYDROCHLORIDE 10 MG/1
10 TABLET, FILM COATED ORAL EVERY 8 HOURS
Status: DISCONTINUED | OUTPATIENT
Start: 2024-07-24 | End: 2024-07-25

## 2024-07-24 RX ORDER — AMOXICILLIN 500 MG/1
500 CAPSULE ORAL NIGHTLY
Status: DISCONTINUED | OUTPATIENT
Start: 2024-07-24 | End: 2024-07-25 | Stop reason: HOSPADM

## 2024-07-24 RX ORDER — HYDROCODONE BITARTRATE AND ACETAMINOPHEN 500; 5 MG/1; MG/1
TABLET ORAL
Status: DISCONTINUED | OUTPATIENT
Start: 2024-07-24 | End: 2024-07-25 | Stop reason: HOSPADM

## 2024-07-24 RX ADMIN — WARFARIN SODIUM 2.5 MG: 2.5 TABLET ORAL at 05:07

## 2024-07-24 RX ADMIN — AMOXICILLIN 500 MG: 500 CAPSULE ORAL at 08:07

## 2024-07-24 RX ADMIN — AMIODARONE HYDROCHLORIDE 400 MG: 200 TABLET ORAL at 08:07

## 2024-07-24 RX ADMIN — SEVELAMER CARBONATE 0.8 G: 800 POWDER, FOR SUSPENSION ORAL at 08:07

## 2024-07-24 RX ADMIN — TRAZODONE HYDROCHLORIDE 25 MG: 50 TABLET ORAL at 12:07

## 2024-07-24 RX ADMIN — SEVELAMER CARBONATE 0.8 G: 800 POWDER, FOR SUSPENSION ORAL at 01:07

## 2024-07-24 RX ADMIN — GABAPENTIN 100 MG: 100 CAPSULE ORAL at 08:07

## 2024-07-24 RX ADMIN — PANTOPRAZOLE SODIUM 40 MG: 40 TABLET, DELAYED RELEASE ORAL at 08:07

## 2024-07-24 RX ADMIN — FUROSEMIDE 20 MG/HR: 10 INJECTION, SOLUTION INTRAMUSCULAR; INTRAVENOUS at 04:07

## 2024-07-24 RX ADMIN — ATORVASTATIN CALCIUM 40 MG: 40 TABLET, FILM COATED ORAL at 08:07

## 2024-07-24 RX ADMIN — HYDRALAZINE HYDROCHLORIDE 10 MG: 10 TABLET ORAL at 08:07

## 2024-07-24 RX ADMIN — SEVELAMER CARBONATE 0.8 G: 800 POWDER, FOR SUSPENSION ORAL at 05:07

## 2024-07-24 NOTE — PLAN OF CARE
Problem: Adult Inpatient Plan of Care  Goal: Plan of Care Review  Outcome: Progressing  Flowsheets (Taken 7/23/2024 2200)  Plan of Care Reviewed With:   patient   spouse     Problem: Acute Kidney Injury/Impairment  Goal: Fluid and Electrolyte Balance  Outcome: Progressing  Intervention: Monitor and Manage Fluid and Electrolyte Balance  Flowsheets (Taken 7/23/2024 2200)  Fluid/Electrolyte Management: (1.5L/day fluid restriction. strict intake/output and daily weights monitored)   fluids restricted   other (see comments)     Problem: Wound  Goal: Optimal Pain Control and Function  Outcome: Progressing      Additional Progress Note...

## 2024-07-24 NOTE — PT/OT/SLP EVAL
Physical Therapy Co-Evaluation with OT and Treatment     Patient Name:  Radha Abbott  MRN: [id number]    Admit Date: 7/23/2024  Admitting Diagnosis:  Acute decompensated heart failure  Length of Stay: 1 days  Recent Surgery: * No surgery found *      Ambulate with RW with nsg - contact guard  Recommendations:     Discharge Recommendations: low intensity therapy  Equipment recommendations: none  Barriers to discharge: None Identified     Assessment:     Radha Abbott is a 64 y.o. male admitted to [facility] on 7/23/2024 with medical diagnosis of Acute decompensated heart failure. Pt presents with impaired endurance, weakness, impaired functional mobility, gait instability, impaired balance, impaired cardiopulmonary response to activity, edema. These deficits effect their roles and responsibilities in which they were able to complete prior to admit.     Pt is agreeable to therapy evaluation and session. Wife in room with pt in recliner when therapists entered. Per wife, pt had been home from rehab for 1 week (HHPT coming 2x/week) before current admission. Pt ambulates short distances at home with RW; uses w/c for longer household distances. Pt remained on wall power during session. Able to ambulate to door with RW and back to recliner. Left sitting up with nsg and wife in room. All VSS and no VAD alarms during session.    Radha Abbott would benefit from acute PT intervention to improve quality of life, focus on recovery of impairments, provide patient/caregiver education, reduce fall risk, and maximize (I) and safety with functional mobility. Once medically stable, recommending pt discharge to low intensity therapy. Patient currently demonstrates a need for low intensity therapy on a scheduled basis secondary to a decline in functional status due to fluid overload  .      Rehab Prognosis: Good    Plan:     During this hospitalization, patient to be seen 4 x/week to address the identified rehab impairments via gait training,  therapeutic activities, therapeutic exercises, neuromuscular re-education and progress towards stated goals.     Plan of Care Expires:  08/23/24  Plan of Care reviewed with: patient, spouse    This plan of care has been discussed with the patient/caregiver, who was included in its development and is in agreement with the identified goals and treatment plan.     Subjective     Communicated with RN prior to session.  Patient found up in chair upon PT entry to room, agreeable to evaluation. Pt's wife present during session.    Chief Complaint: none stated    Patient/Family Comments/goals: motivated to return home    Pain/Comfort:  Pain Rating 1: 0/10  Pain Rating Post-Intervention 1: 0/10    Patients cultural, spiritual, Yarsani conflicts given the current situation: None identified     Patient History: information obtained from pt     Living Environment: Pt lives with wife in single level home with threshold JACKIE. Bathroom set-up: walk-in shower with built in bench and grab bar  Prior Level of Function: modified (I) for mobility and ADLs using rolling walker and wheelchair as AD   DME owned: rolling walker, bedside commode, and wheelchair  Support Available/Caregiver Assistance:  wife    Objective:   OT present for coeval due to pt's multiple medical comorbidities and functional/cognition deficits requiring two skilled therapists to appropriately progress pt's musculoskeletal strength, neuromuscular control, and endurance while taking into consideration medical acuity and pt safety.    Patient found with: telemetry, LVAD, peripheral IV    Recent Surgery: * No surgery found *    General Precautions: Standard, fall, LVAD   Orthopedic Precautions:    Braces:     Oxygen Device: room air      Exams:    Cognition:  Alert  Command following: Follows multistep verbal commands  Communication: clear/fluent    Sensation:   Light touch sensation: Intact BLEs    Gross Motor Coordination: No deficits noted during functional  mobility tasks     Edema/Skin Integrity: Mild BLE edema; Visible skin intact    Postural examination/scapula alignment: no deficits noted    Lower Extremity Range of Motion:  Right Lower Extremity: WFL  Left Lower Extremity: WFL    Lower Extremity Strength:  Right Lower Extremity:  grossly 4/5  Left Lower Extremity: grossly 4/5    Functional Mobility:    Bed Mobility:  Not assessed d/t up in recliner    Transfers:   Sit <> Stand Transfer: Minimal Assistance x 1 trials from recliner with RW AD              Gait:  Distance: ~25 ft in room  Assistance level: Contact Guard Assistance  Assistive Device: rolling walker  Gait Assessment: decreased step length , decreased gina, and narrow base of support    Balance:  Dynamic Sitting: GOOD: Maintains balance through MODERATE excursions of active trunk movement  Standing:  Static: FAIR+: Takes MINIMAL challenges from all directions   Dynamic: FAIR: Needs CONTACT GUARD during gait    Outcome Measure: AM-PAC 6 CLICK MOBILITY  Total Score:18     Patient/Caregiver Education:     Therapist educated pt/caregiver regarding:   PT POC and goals for therapy   Safety with mobility and fall risk   Instruction on use of call button and importance of calling nursing staff for assistance with mobility   Time provided for therapeutic counseling and discussion of current health disposition. All questions answered to satisfaction, within scope of PT practice     Patient/caregiver able to verbalize understanding and expressed no further questions this visit; will follow-up with pt/caregiver during current admit for additional questions/concerns within scope of practice.     White board updated.     Patient left up in chair with all lines intact, call button in reach, and nsg and wife present.    GOALS:   Multidisciplinary Problems       Physical Therapy Goals          Problem: Physical Therapy    Goal Priority Disciplines Outcome Goal Variances Interventions   Physical Therapy Goal     PT,  PT/OT Progressing     Description: Goals to be met by: 34     Patient will increase functional independence with mobility by performin. Supine to sit with Stand-by Assistance  2. Sit to supine with Stand-by Assistance  3. Sit to stand transfer with Stand-by Assistance  4. Bed to chair transfer with Stand-by Assistance using LRAD  5. Gait  x 150 feet with Stand-by Assistance using Rolling Walker.                            History:     Past Medical History:   Diagnosis Date    Aspiration pneumonia of both lungs 2024    CAD (coronary artery disease)     CHF (congestive heart failure)     Delirium 2024    Diabetes mellitus     HFrEF (heart failure with reduced ejection fraction)     Hypoglycemia 2024    ICD (implantable cardioverter-defibrillator) in place     MI, old     Type 2 diabetes mellitus without complication, without long-term current use of insulin 10/07/2023       Past Surgical History:   Procedure Laterality Date    ANGIOPLASTY-VENOUS ARTERY Right 2023    Procedure: ANGIOPLASTY-VENOUS ARTERY, RIGHT FEMORAL;  Surgeon: Yuri Washington MD;  [facility];  Service: Cardiovascular;  Laterality: Right;    AORTIC VALVULOPLASTY N/A 2023    Procedure: REPAIR, AORTIC VALVE;  Surgeon: Yuri Washington MD;  Location: [facility];  Service: Cardiovascular;  Laterality: N/A;    CARDIAC SURGERY      CLOSURE N/A 2023    Procedure: CLOSURE, TEMPORARY;  Surgeon: Yuri Washington MD;  Location: [facility] OR [facility] FLR;  Service: Cardiovascular;  Laterality: N/A;    DRAINAGE OF PLEURAL EFFUSION  2023    Procedure: DRAINAGE, PLEURAL EFFUSION;  Surgeon: Yuri Washington MD;  [facility];  Service: Cardiovascular;;    INSERTION OF GRAFT TO PERICARDIUM  2023    Procedure: INSERTION, GRAFT, PERICARDIUM;  Surgeon: Yuri Washington MD;  [facility];  Service: Cardiovascular;;    INSERTION OF INTRA-AORTIC BALLOON ASSIST DEVICE Right 2023     Procedure: INSERTION, INTRA-AORTIC BALLOON PUMP;  Surgeon: Finn Cohn MD;  Location: [facility] CATH LAB;  Service: Cardiology;  Laterality: Right;    LEFT VENTRICULAR ASSIST DEVICE Left 12/1/2023    Procedure: INSERTION-LEFT VENTRICULAR ASSIST DEVICE;  Surgeon: Yuri Washington MD;  Location: [facility];  Service: Cardiovascular;  Laterality: Left;  REDO STERNOTOMY - REDO SAW NEEDED FOR CASE    LYSIS OF ADHESIONS  12/1/2023    Procedure: LYSIS, ADHESIONS;  Surgeon: Yuri Washington MD;  [facility];  Service: Cardiovascular;;    PLACEMENT OF SWAN ROLANDO CATHETER WITH IMAGING GUIDANCE  11/20/2023    Procedure: INSERTION, CATHETER, SWAN-ROLANDO, WITH IMAGING GUIDANCE;  Surgeon: Sajan Hurley MD;  Location: [facility] CATH LAB;  Service: Cardiology;;    REMOVAL OF TUNNELED CENTRAL VENOUS CATHETER (CVC) N/A 3/1/2024    Procedure: REMOVAL, CATHETER, CENTRAL VENOUS, TUNNELED;  Surgeon: Seble Aguilar MD;  Location: [facility] CATH LAB;  Service: Interventional Nephrology;  Laterality: N/A;    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 12/1/2023    Procedure: REPAIR, ANEURYSM, ARTERY, FEMORAL;  Surgeon: Yuri Washington MD;  [facility];  Service: Cardiovascular;  Laterality: Right;  Right Femoral Artery Repair    RIGHT HEART CATHETERIZATION Right 10/10/2023    Procedure: INSERTION, CATHETER, RIGHT HEART;  Surgeon: Bin Gandhi MD;  Location: [facility] CATH LAB;  Service: Cardiology;  Laterality: Right;    RIGHT HEART CATHETERIZATION Right 10/13/2023    Procedure: INSERTION, CATHETER, RIGHT HEART;  Surgeon: Walter Mcintyre MD;  Location: [facility] CATH LAB;  Service: Cardiology;  Laterality: Right;    RIGHT HEART CATHETERIZATION  11/13/2023    RIGHT HEART CATHETERIZATION Right 11/13/2023    Procedure: INSERTION, CATHETER, RIGHT HEART;  Surgeon: Juventino Bermudez Jr., MD;  Location: [facility] CATH LAB;  Service: Cardiology;  Laterality: Right;    RIGHT HEART CATHETERIZATION Right 11/20/2023    Procedure:  INSERTION, CATHETER, RIGHT HEART;  Surgeon: Sajan Hurley MD;  Location: [facility] CATH LAB;  Service: Cardiology;  Laterality: Right;    RIGHT HEART CATHETERIZATION Right 1/22/2024    Procedure: INSERTION, CATHETER, RIGHT HEART;  Surgeon: Brayan Ocampo MD;  Location: [facility] CATH LAB;  Service: Cardiology;  Laterality: Right;    STERNAL WOUND CLOSURE N/A 12/4/2023    Procedure: CLOSURE, WOUND, STERNUM;  Surgeon: Yuri Washington MD;  Location: [facility] OR [address] [place] [address];  Service: Cardiovascular;  Laterality: N/A;    STERNOTOMY N/A 12/1/2023    Procedure: STERNOTOMY, REDO;  Surgeon: Yuri Washington MD;  Location: [facility] OR [address] [place] [address];  Service: Cardiovascular;  Laterality: N/A;    VALVULOPLASTY, MITRAL VALVE N/A 12/1/2023    Procedure: VALVULOPLASTY, MITRAL VALVE;  Surgeon: Yuri Washington MD;  Location: [facility] OR [address] [place] [address];  Service: Cardiovascular;  Laterality: N/A;       Time Tracking:     PT Received On: 07/24/24  PT Start Time: 1306     PT Stop Time: 1323  PT Total Time (min): 17 min     Billable Minutes: Evaluation 8 and Gait Training 9    07/24/2024

## 2024-07-24 NOTE — PROGRESS NOTES
07/24/2024  Erasmo Hooper    Current provider:  Sajan Hurley, *    Device interrogation:      7/24/2024     8:41 AM 7/24/2024     4:20 AM 7/23/2024    10:00 PM 7/23/2024     2:01 PM 7/1/2024     7:45 AM 7/1/2024     4:16 AM 6/30/2024    11:35 PM   TXP LVAD INTERROGATIONS   Type HeartMate3 HeartMate3 HeartMate3 HeartMate3 HeartMate3 HeartMate3 HeartMate3   Flow 3.3 3.3 3.6 3.5 3.9 4.3 4   Speed 5000 5000 5000 5000 5000 5050 5000   PI 7 7.4 6.1 6.1 4.7 3.7 4   Power ([place]) 3.4 3.3 3.4 3.4 3.4 3.4 3.4   LSL 4600 4600 4600  4600 4600 4600   Low Flow Alarm      none none   High Power Alarm      none none   Pulsatility Pulse Pulse Intermittent pulse Intermittent pulse Intermittent pulse Pulse Pulse          Rounded on [facility] to ensure all mechanical assist device settings (IABP or VAD) were appropriate and all parameters were within limits.  I was able to ensure all back up equipment was present, the staff had no issues, and the Perfusion Department daily rounding was complete.      For implantable VADs: Interrogation of Ventricular assist device was performed with analysis of device parameters and review of device function. I have personally reviewed the interrogation findings and agree with findings as stated.     In emergency, the nursing units have been notified to contact the perfusion department either by:  Calling [phone] from 630am to 4pm Mon thru Fri, utilizing the On-Call Finder functionality of Epic and searching for Perfusion, or by contacting the hospital  from 4pm to 630am and on weekends and asking to speak with the perfusionist on call.    12:19 PM

## 2024-07-24 NOTE — CONSULTS
"  [facility] - Cardiology Stepdown  Adult Nutrition  Consult Note    SUMMARY     Recommendations  1. Continue renal diet- rec'd removing cardiac restrictions   2. If po intake declines <50% meal consumption, rec'd addition of Novasource Renal BID for optimization of protein/calorie intake   3. RD following    Goals: Meet % of EEN/EPN by RD f/u date  Nutrition Goal Status: goal not met  Communication of RD Recs: POC    Assessment and Plan    Nutrition Problem  Inadequate energy intake     Related to (etiology):   Physiological needs     Signs and Symptoms (as evidenced by):   0-25% meal provided     Interventions (treatment strategy):  Collaboration of nutrition care w/ other providers     Nutrition Diagnosis Status:   New     Reason for Assessment    Reason For Assessment: consult  Diagnosis: cardiac disease  Relevant Medical History: DM, type 2  Interdisciplinary Rounds: attended  General Information Comments: RD consulted for nutritional assessment. Spoke w/ pt and pt's caregiver at bedside, reports a poor appetite in the hospital. Pt receiving dialysis during visit. Pt's caregiver had questions in regards to liberalizing the diet for more meals that can be offered to the pt. Pt consumed a little breakfast. UBW of 175-177 lbs. Per chart review, noted 12% weight gain within last month- this may d/t fluid shifts r/t dialysis. NFPE completed, 7/24 pt is at risk for malnutrition if po intake declines <50% meal consumption. LBM noted-7/23/24  Nutrition Discharge Planning: Heart healthy diet, low sodium diet     Nutrition Risk Screen    Nutrition Risk Screen: no indicators present    Nutrition/Diet History    Food Allergies: NKFA    Anthropometrics    Temp: 97.8 °F (36.6 °C)  Height Method: Stated  Height: 6' 1" (185.4 cm)  Height (inches): 73 in  Weight Method: Bed Scale (states too weak to stand for standing weight at this time)  Weight: 83.6 kg (184 lb 4.9 oz)  Weight (lb): 184.31 lb  Ideal Body Weight (IBW), " Male: 184 lb  % Ideal Body Weight, Male (lb): 100.17 %  BMI (Calculated): 24.3  BMI Grade: 18.5-24.9 - normal       Lab/Procedures/Meds    Pertinent Labs Reviewed: reviewed  Pertinent Labs Comments: GFR 17.1, Glucose 141  Pertinent Medications Reviewed: reviewed  Pertinent Medications Comments: coumadin    Estimated/Assessed Needs    Weight Used For Calorie Calculations: 83.6 kg (184 lb 4.9 oz)  Energy Calorie Requirements (kcal): 2111  Energy Need Method: Mercer-St Jeor (PAL 1.25)  Protein Requirements: 100 g (1.2 g/kg)  Weight Used For Protein Calculations: 83.6 kg (184 lb 4.9 oz)  RDA Method (mL): 2111         Nutrition Prescription Ordered    Current Diet Order: Cardiac, Renal diet    Evaluation of Received Nutrient/Fluid Intake    I/O: +166 ml since admit  Energy Calories Required: not meeting needs  Protein Required: not meeting needs  Fluid Required: not meeting needs  Total Fluid Intake (mL/kg): 1 ml or fluid per MD  Tolerance: tolerating  % Intake of Estimated Energy Needs: 0 - 25 %  % Meal Intake: 0 - 25 %    Nutrition Risk    Level of Risk/Frequency of Follow-up: low ((1x/week))     Monitor and Evaluation    Food and Nutrient Intake: food and beverage intake, energy intake  Food and Nutrient Adminstration: diet order  Knowledge/Beliefs/Attitudes: food and nutrition knowledge/skill, beliefs and attitudes  Physical Activity and Function: nutrition-related ADLs and IADLs, factors affecting access to physical activity  Anthropometric Measurements: height/length, weight, weight change, body mass index, other (specify), growth pattern indices/percentile ranks  Biochemical Data, Medical Tests and Procedures: electrolyte and renal panel, gastrointestinal profile, glucose/endocrine profile, inflammatory profile, lipid profile  Nutrition-Focused Physical Findings: overall appearance, extremities, muscles and bones, head and eyes, skin       Nutrition Follow-Up    RD Follow-up?: Yes

## 2024-07-24 NOTE — ASSESSMENT & PLAN NOTE
-HeartMate 3 Implanted  12/1/2023  as DT  -Boost Coumadin, Goal INR 2.0-3.0 . Subtherapeutic today.   -Antiplatelets Not on ASA  -LDH is stable overall today. Will continue to monitor daily.  -Speed set at 5000, LSL 4600 rpm  -Interrogation notable for no events  -Not listed for OHTx        Procedure: Device Interrogation Including analysis of device parameters  Current Settings: Ventricular Assist Device  Review of device function is stable/unstable stable        7/24/2024    12:21 PM 7/24/2024     8:41 AM 7/24/2024     4:20 AM 7/23/2024    10:00 PM 7/23/2024     2:01 PM 7/1/2024     7:45 AM 7/1/2024     4:16 AM   TXP LVAD INTERROGATIONS   Type HeartMate3 HeartMate3 HeartMate3 HeartMate3 HeartMate3 HeartMate3 HeartMate3   Flow 3.5 3.3 3.3 3.6 3.5 3.9 4.3   Speed 5100 5000 5000 5000 5000 5000 5050   PI 7.3 7 7.4 6.1 6.1 4.7 3.7   Power ([place]) 3.5 3.4 3.3 3.4 3.4 3.4 3.4   LSL  4600 4600 4600  4600 4600   Low Flow Alarm       none   High Power Alarm       none   Pulsatility Pulse Pulse Pulse Intermittent pulse Intermittent pulse Intermittent pulse Pulse

## 2024-07-24 NOTE — PLAN OF CARE
OT eval complete. OT POC and goals established.   Problem: Occupational Therapy  Goal: Occupational Therapy Goal  Description: Goals to be met by: 8/23/24     Patient will increase functional independence with ADLs by performing:    LE Dressing with Minimal Assistance.  Grooming while standing at sink with Modified [place].  Toileting from toilet/bedside commode with Modified Crawford for hygiene and clothing management.   Supine to sit with Modified [place].  Toilet transfer to toilet/bedside commode with Modified Crawford and LRAD prn.  Upper extremity exercise program x15 reps per handout, with independence.    Outcome: Progressing

## 2024-07-24 NOTE — ASSESSMENT & PLAN NOTE
Discharged from rehab on 7/12 with significant volume retention since. Pt reports weight gain, SOB, abdominal distension, and LE edema. Weight at discharge 161 lb, presented at 184 lb on admission  -IV Lasix gtt at 20 mg/hr   -Nephrology consulted, HD today

## 2024-07-24 NOTE — PLAN OF CARE
Plan of care discussed with patient. Pt received 1 unit of PRBCs during shift. Pt received dialysis via bedside during shift. Pt tolerated both well. Doppler numbers slightly elevated at beginning of the shift, PA notified. Hydralazine ordered for night shift. LVAD numbers WNL, smooth LVAD hum. Patient has no complaints of pain. Fall precautions in place. Discussed medications and care. Patient has no questions at this time.

## 2024-07-24 NOTE — PROGRESS NOTES
Arrived patient's room for bedside HD TX.  Received report from primary nurse.  Pt awake, alert and responsive.  HD TX  started via RIJ cvc.

## 2024-07-24 NOTE — PROGRESS NOTES
[facility]FF HEMODIALYSIS NOTE     Patient currently on hemodialysis for removal of uremic toxins and volume.     Patient seen and evaluated on hemodialysis, tolerating treatment, see HD flowsheet for vitals and assessments.    Labs have been reviewed and the dialysate bath has been adjusted.       Assessment/Plan:    -Patient seen on HD, tolerating treatment well, w/o complaints   -UF goal of 2-2.5L  -Plan for UF only tomorrow 7/25  -Agree with diuretics, 24  cc   -Renal diet, if not NPO   -Strict I/O's and daily weights  -Daily renal function panels  -Keep MAP >65 while on HD   -Plan to transfuse 1 unit PRBCs with HD   -Continue sevelamer   -Will continue to follow while inpatient     Yolanda Cai DNP-FNP, C  Nephrology  Pager: [phone]

## 2024-07-24 NOTE — SUBJECTIVE & OBJECTIVE
Interval History: Admitted yesterday with volume overload. Diuresed minimally on IV Lasix gtt at 20 mg/hr. Dialysis this morning for volume removal. Will also give 1 unit pRBC during dialysis.    Continuous Infusions:   furosemide (Lasix) 500 mg in 50 mL infusion (conc: 10 mg/mL)  20 mg/hr Intravenous Continuous 2 mL/hr at 07/24/24 0448 20 mg/hr at 07/24/24 0448     Scheduled Meds:   amiodarone  400 mg Oral Daily    atorvastatin  40 mg Oral QHS    gabapentin  100 mg Oral BID    pantoprazole  40 mg Oral Daily    sevelamer carbonate  0.8 g Oral TID WM    warfarin  2.5 mg Oral Daily     PRN Meds:  Current Facility-Administered Medications:     0.9%  NaCl infusion (for blood administration), , Intravenous, Q24H PRN    acetaminophen, 1,000 mg, Oral, Q8H PRN    senna-docusate 8.6-50 mg, 2 tablet, Oral, Daily PRN    traZODone, 25 mg, Oral, Nightly PRN    Review of patient's allergies indicates:  No Known Allergies  Objective:     Vital Signs (Most Recent):  Temp: 97.8 °F (36.6 °C) (07/24/24 1215)  Pulse: 85 (07/24/24 1145)  Resp: 17 (07/24/24 1215)  BP: (!) 96/0 (07/24/24 1215)  SpO2: 95 % (07/24/24 0754) Vital Signs (24h Range):  Temp:  [97.7 °F (36.5 °C)-98.2 °F (36.8 °C)] 97.8 °F (36.6 °C)  Pulse:  [79-94] 85  Resp:  [16-23] 17  SpO2:  [95 %-100 %] 95 %  BP: ()/(0-92) 96/0     Patient Vitals for the past 72 hrs (Last 3 readings):   Weight   07/23/24 2200 83.6 kg (184 lb 4.9 oz)   07/23/24 1114 83.5 kg (184 lb)     Body mass index is 24.32 kg/m².      Intake/Output Summary (Last 24 hours) at 7/24/2024 1347  Last data filed at 7/24/2024 1200  Gross per 24 hour   Intake 1126 ml   Output 3200 ml   Net -2074 ml       Hemodynamic Parameters:              Physical Exam  Vitals and nursing note reviewed.   HENT:      Head: Normocephalic.      Nose: Nose normal.   Eyes:      Conjunctiva/sclera: Conjunctivae normal.   Neck:      Comments: +JVP  Cardiovascular:      Rate and Rhythm: Normal rate.   Pulmonary:      Effort:  Pulmonary effort is normal.   Abdominal:      General: Abdomen is flat.      Tenderness: There is no abdominal tenderness.   Musculoskeletal:         General: Normal range of motion.   Skin:     General: Skin is warm.   Neurological:      General: No focal deficit present.      Mental Status: He is alert.   Psychiatric:         Mood and Affect: Mood normal.         Behavior: Behavior normal.         Thought Content: Thought content normal.         Judgment: Judgment normal.            Significant Labs:  CBC:  Recent Labs   Lab 07/23/24  1135 07/23/24  1802 07/24/24  0252   WBC 6.65  --  10.70   RBC 2.43*  --  2.60*   HGB 6.7*  --  7.0*   HCT 22.3* 39 23.1*     --  322   MCV 92  --  89   MCH 27.6  --  26.9*   MCHC 30.0*  --  30.3*     BNP:  Recent Labs   Lab 07/23/24  1135 07/24/24  0252   BNP 1,665* 1,648*     CMP:  Recent Labs   Lab 07/23/24  1135 07/24/24  0252   * 141*   CALCIUM 7.9* 8.5*   ALBUMIN 2.1* 2.1*   PROT 6.9 6.8    138   K 3.2* 3.2*   CO2 23 21*    107   BUN 27* 29*   CREATININE 3.6* 3.8*   ALKPHOS 172* 156*   ALT 26 25   AST 22 20   BILITOT 0.2 0.3      Coagulation:   Recent Labs   Lab 07/22/24  0715 07/23/24  1327 07/24/24  0252   INR 1.8* 1.9* 2.1*   APTT  --   --  42.1*     LDH:  Recent Labs   Lab 07/24/24  0252   *     Microbiology:  Microbiology Results (last 7 days)       ** No results found for the last 168 hours. **            I have reviewed all pertinent labs within the past 24 hours.    Estimated Creatinine Clearance: 22.8 mL/min (A) (based on SCr of 3.8 mg/dL (H)).    Diagnostic Results:  I have reviewed all pertinent imaging results/findings within the past 24 hours.

## 2024-07-24 NOTE — PLAN OF CARE
Recommendations  1. Continue renal diet- rec'd removing cardiac restrictions   2. If po intake declines <50% meal consumption, rec'd addition of Novasource Renal BID for optimization of protein/calorie intake   3. RD following     Goals: Meet % of EEN/EPN by RD f/u date  Nutrition Goal Status: goal not met  Communication of RD Recs: POC

## 2024-07-24 NOTE — ED NOTES
Telemetry Verification   Patient placed on Telemetry Box  Verified with War Room  Box # [id number]   Monitor Tech    Rate 93   Rhythm LVAD

## 2024-07-24 NOTE — TELEPHONE ENCOUNTER
Contacted patient to schedule colonoscopy. Spoke with his spouse Arlyn who states patient is hospitalized now. Refendo placed. Patient's spouse verbalized understanding.

## 2024-07-24 NOTE — NURSING
Notified LVAD coordinator as of 2225 of patient arrival and status. Instructed to call if low flow alarms present. Dopplers high reported.  Plan to diurese and dialize.   Yamil Cai, RN -relief charge spoke with Marilu Ma LVAD coordinator.   38.4

## 2024-07-24 NOTE — TELEPHONE ENCOUNTER
Page received from patient's wife. Patient still has BLE edema and SOB despite HD today and taking Bumex and Metolazone this morning. Per wife, she asked HD to take off more fluid but they told her the most they can take off is 1.5L. Informed wife that the VAD Coordinators will contact the HD center in the morning. In regards to patient still be swollen and SOB, I advised for patient to report to the ER or he can take another Bumex tonight but if still swollen and SOB in the morning, he needs to come to the ER. Patient does not want to go to the ER tonight. He will take the Bumex tonight and go to the ER in the morning.

## 2024-07-24 NOTE — NURSING
Informed by BENI OviedoU RN, that patient arrived to room 309 from the ER. No alarms and has VAD emergency equipment. Planning for HD tonight or in the morning.

## 2024-07-24 NOTE — PLAN OF CARE
PT evaluation complete - see note for details. POC and goals established.    Problem: Physical Therapy  Goal: Physical Therapy Goal  Description: Goals to be met by: 34     Patient will increase functional independence with mobility by performin. Supine to sit with Stand-by Assistance  2. Sit to supine with Stand-by Assistance  3. Sit to stand transfer with Stand-by Assistance  4. Bed to chair transfer with Stand-by Assistance using LRAD  5. Gait  x 150 feet with Stand-by Assistance using Rolling Walker.     Outcome: Progressing     2024

## 2024-07-24 NOTE — CARE UPDATE
I have reviewed the chart of Radha Abbott who is hospitalized for the following:    Active Hospital Problems    Diagnosis    *Acute decompensated heart failure    Acute renal failure superimposed on stage 4 chronic kidney disease    Anemia of chronic disease     Due to underlying CKD  S/p PRBC transfusion  Daily CBC      LVAD (left ventricular assist device) present        Larissa Blackwell NP  [facility]

## 2024-07-24 NOTE — PT/OT/SLP EVAL
Occupational Therapy   Co-Evaluation  Co-treatment performed due to patient's multiple deficits requiring two skilled therapists to appropriately and safely assess patient's strength and endurance while facilitating functional tasks in addition to accommodating for patient's activity tolerance.      Name: Radha Abbott  MRN: [id number]  Admitting Diagnosis: Acute decompensated heart failure  Recent Surgery: * No surgery found *      Recommendations:     Discharge Recommendations: Low Intensity Therapy  Discharge Equipment Recommendations:  none  Barriers to discharge:  None    Assessment:     Radha Abbott is a 64 y.o. male with a medical diagnosis of Acute decompensated heart failure.  He presents with the following performance deficits affecting function: impaired endurance, impaired self care skills, impaired functional mobility, gait instability, impaired balance, impaired cardiopulmonary response to activity, edema. Pt agreeable to therapy and tolerated well. He is s/p LVAD implantation 12/1/23. [place] [facility] [place] [facility]. Pt reports discharging home ~1 week PTA and reports being limited in functional mobility 2/2 SOB and fatigue. Pt would continue to benefit from skilled OT services to maximize functional independence with ADLs and functional mobility, reduce caregiver burden, and facilitate safe discharge in the least restrictive environment.      Rehab Prognosis: Good; patient would benefit from acute skilled OT services to address these deficits and reach maximum level of function.       Plan:     Patient to be seen 3 x/week to address the above listed problems via self-care/home management, therapeutic activities, therapeutic exercises, neuromuscular re-education  Plan of Care Expires: 08/23/24  Plan of Care Reviewed with: patient, spouse    Subjective     Chief Complaint: edema  Patient/Family Comments/goals: to return to [facility]    Occupational Profile:  Living Environment: Pt lives with  spouse. SSH with threshold. Built in bench in W-I-S and grab bars  Previous level of function: (A) required for socks/shoes otherwise Mod (I) with ADLs and functional mobility using RW for HH distances and w/c for community distances; (I) for LVAD management  Roles and Routines: d/c from [facility] ~1 week PTA; pt receiving HH PT/OT  Equipment Used at Home: bedside commode, grab bar, walker, rolling, wheelchair, other (see comments) (BIB in W-I-S)  Assistance upon Discharge: spouse    Pain/Comfort:  Pain Rating 1: 0/10  Pain Rating Post-Intervention 1: 0/10    Patients cultural, spiritual, Protestant conflicts given the current situation: no    Objective:     Communicated with: RN prior to session.  Patient found up in chair with LVAD, telemetry, peripheral IV upon OT entry to room.    General Precautions: Standard, fall, LVAD  Orthopedic Precautions: N/A  Braces: N/A  Respiratory Status: Room air    Occupational Performance:    Bed Mobility:    Not assessed- pt found/left sitting on bedside chair    Functional Mobility/Transfers:  Patient completed Sit <> Stand Transfer with minimum assistance  with  rolling walker   Functional Mobility: Pt engaged in functional mobility to simulate household/community distances ~25 ft with CGA and RW in order to maximize functional endurance and standing balance required for engagement in occupations of choice   No LOB or SOB Noted  Pt remained on wall power throughout    Activities of Daily Living:  Upper Body Dressing: stand by assistance to pull gown above shoulders seated    Cognitive/Visual Perceptual:  Cognitive/Psychosocial Skills:     -       Oriented to: Person, Place, Time, and Situation   -       Follows Commands/attention:Follows two-step commands  -       Safety awareness/insight to disability: intact   -       Mood/Affect/Coping skills/emotional control: Cooperative    Physical Exam:  Sensation:    -       Intact  Upper Extremity Range of Motion:     -       Right Upper  Extremity: WFL  -       Left Upper Extremity: WFL  Upper Extremity Strength:    -       Right Upper Extremity: WFL  -       Left Upper Extremity: WFL  Fine Motor Coordination:    -       Impaired  Left hand thumb/finger opposition skills decreased precision/accuracy and Right hand thumb/finger opposition skills decreased precision/accuracy    AMPAC 6 Click ADL:  AMPAC Total Score: 19    Treatment & Education:  -Education on energy conservation and task modification to maximize safety and (I) during ADLs and mobility  -Education on importance of OOB activity to improve overall activity tolerance and promote recovery  -Pt educated to call for assistance and to transfer with hospital staff only  -Provided education regarding role of OT, POC, & discharge recommendations with pt and spouse verbalizing understanding.  Pt had no further questions & when asked whether there were any concerns pt reported none.     Patient left up in chair with all lines intact, call button in reach, RN notified, and spouse present    GOALS:   Multidisciplinary Problems       Occupational Therapy Goals          Problem: Occupational Therapy    Goal Priority Disciplines Outcome Interventions   Occupational Therapy Goal     OT, PT/OT Progressing    Description: Goals to be met by: 8/23/24     Patient will increase functional independence with ADLs by performing:    LE Dressing with Minimal Assistance.  Grooming while standing at sink with Modified [place].  Toileting from toilet/bedside commode with Modified [place] for hygiene and clothing management.   Supine to sit with Modified [place].  Toilet transfer to toilet/bedside commode with Modified [place] and LRAD prn.  Upper extremity exercise program x15 reps per handout, with independence.                         History:     Past Medical History:   Diagnosis Date    Aspiration pneumonia of both lungs 06/17/2024    CAD (coronary artery disease)     CHF (congestive heart  failure)     Delirium 06/16/2024    Diabetes mellitus     HFrEF (heart failure with reduced ejection fraction)     Hypoglycemia 06/12/2024    ICD (implantable cardioverter-defibrillator) in place     MI, old     Type 2 diabetes mellitus without complication, without long-term current use of insulin 10/07/2023         Past Surgical History:   Procedure Laterality Date    ANGIOPLASTY-VENOUS ARTERY Right 12/1/2023    Procedure: ANGIOPLASTY-VENOUS ARTERY, RIGHT FEMORAL;  Surgeon: Yuri Washington MD;  [facility];  Service: Cardiovascular;  Laterality: Right;    AORTIC VALVULOPLASTY N/A 12/1/2023    Procedure: REPAIR, AORTIC VALVE;  Surgeon: Yuri Washington MD;  [facility];  Service: Cardiovascular;  Laterality: N/A;    CARDIAC SURGERY      CLOSURE N/A 12/1/2023    Procedure: CLOSURE, TEMPORARY;  Surgeon: Yuri Washington MD;  [facility];  Service: Cardiovascular;  Laterality: N/A;    DRAINAGE OF PLEURAL EFFUSION  12/4/2023    Procedure: DRAINAGE, PLEURAL EFFUSION;  Surgeon: Yuri Washington MD;  [facility];  Service: Cardiovascular;;    INSERTION OF GRAFT TO PERICARDIUM  12/4/2023    Procedure: INSERTION, GRAFT, PERICARDIUM;  Surgeon: Yuri Washington MD;  [facility];  Service: Cardiovascular;;    INSERTION OF INTRA-AORTIC BALLOON ASSIST DEVICE Right 11/21/2023    Procedure: INSERTION, INTRA-AORTIC BALLOON PUMP;  Surgeon: Finn Cohn MD;  Location: [facility] CATH LAB;  Service: Cardiology;  Laterality: Right;    LEFT VENTRICULAR ASSIST DEVICE Left 12/1/2023    Procedure: INSERTION-LEFT VENTRICULAR ASSIST DEVICE;  Surgeon: Yuri Washington MD;  [facility];  Service: Cardiovascular;  Laterality: Left;  REDO STERNOTOMY - REDO SAW NEEDED FOR CASE    LYSIS OF ADHESIONS  12/1/2023    Procedure: LYSIS, ADHESIONS;  Surgeon: Yuri Washington MD;  [facility]: [facility];  Service: Cardiovascular;;    PLACEMENT OF SWAN ROLANDO CATHETER WITH  IMAGING GUIDANCE  11/20/2023    Procedure: INSERTION, CATHETER, SWAN-ROLANDO, WITH IMAGING GUIDANCE;  Surgeon: Sajan Hurley MD;  Location: [facility] CATH LAB;  Service: Cardiology;;    REMOVAL OF TUNNELED CENTRAL VENOUS CATHETER (CVC) N/A 3/1/2024    Procedure: REMOVAL, CATHETER, CENTRAL VENOUS, TUNNELED;  Surgeon: Seble Aguilar MD;  Location: [facility] CATH LAB;  Service: Interventional Nephrology;  Laterality: N/A;    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 12/1/2023    Procedure: REPAIR, ANEURYSM, ARTERY, FEMORAL;  Surgeon: Yuri Washington MD;  [address]  Service: Cardiovascular;  Laterality: Right;  Right Femoral Artery Repair    RIGHT HEART CATHETERIZATION Right 10/10/2023    Procedure: INSERTION, CATHETER, RIGHT HEART;  Surgeon: Bin Gandhi MD;  Location: [facility] CATH LAB;  Service: Cardiology;  Laterality: Right;    RIGHT HEART CATHETERIZATION Right 10/13/2023    Procedure: INSERTION, CATHETER, RIGHT HEART;  Surgeon: Walter Mcintyre MD;  Location: [facility] CATH LAB;  Service: Cardiology;  Laterality: Right;    RIGHT HEART CATHETERIZATION  11/13/2023    RIGHT HEART CATHETERIZATION Right 11/13/2023    Procedure: INSERTION, CATHETER, RIGHT HEART;  Surgeon: Juventino Bermudez Jr., MD;  Location: [facility] CATH LAB;  Service: Cardiology;  Laterality: Right;    RIGHT HEART CATHETERIZATION Right 11/20/2023    Procedure: INSERTION, CATHETER, RIGHT HEART;  Surgeon: Sajan Hurley MD;  Location: [facility] CATH LAB;  Service: Cardiology;  Laterality: Right;    RIGHT HEART CATHETERIZATION Right 1/22/2024    Procedure: INSERTION, CATHETER, RIGHT HEART;  Surgeon: Brayan Ocampo MD;  Location: [facility] CATH LAB;  Service: Cardiology;  Laterality: Right;    STERNAL WOUND CLOSURE N/A 12/4/2023    Procedure: CLOSURE, WOUND, STERNUM;  Surgeon: Yuri Washington MD;  [facility];  Service: Cardiovascular;  Laterality: N/A;    STERNOTOMY N/A 12/1/2023    Procedure: STERNOTOMY, REDO;  Surgeon: Padmini  MD Yuri;  Location: [facility] OR [address] [place] [address];  Service: Cardiovascular;  Laterality: N/A;    VALVULOPLASTY, MITRAL VALVE N/A 12/1/2023    Procedure: VALVULOPLASTY, MITRAL VALVE;  Surgeon: Yuri Washington MD;  Location: [facility] OR [address] [place] [address];  Service: Cardiovascular;  Laterality: N/A;       Time Tracking:     OT Date of Treatment: 07/24/24  OT Start Time: 1306  OT Stop Time: 1323  OT Total Time (min): 17 min    Billable Minutes:Evaluation 9  Therapeutic Activity 8    7/24/2024

## 2024-07-24 NOTE — PROGRESS NOTES
HD TX completed.  TX time 3.5 hrs.  Net fluid removed 2 liters.  VSS.   Tolerated blood transfusion and HD TX.  Report given to primary nurse.

## 2024-07-24 NOTE — NURSING
2200  Pt arrived from ED, assessment completed (see flowsheets).  Pt is A/Ox4, VERY weak, has been using a walker and wheelchair since leaving rehab (per family).  Denies pain, nausea, and/or SOB.  VAD and DLES WNL.  Oriented to room and call light use and discussed POC for this shift.  Pt and family verbalize understanding with no current questions or concerns voiced.  Call light within reach, will continue to monitor.     0559  Wife just completed giving pt a full bed bath.  Now resting quietly in bed.  No change in previous assessment.  No current needs voiced.  Will continue to monitor.    0609  morning K+ 3.2-On call notified.  Awaiting orders    0638  Previous assessment remains unchanged.  Pt resting quietly with wife at bedside.  No current needs voiced.  Will continue to monitor and report to oncoming shift.    Patient updated on lab results. Patient stated understanding and encouraged to call the office with any questions and or concerns. Medications updated.

## 2024-07-24 NOTE — PLAN OF CARE
Problem: Hemodialysis  Goal: Safe, Effective Therapy Delivery  Outcome: Progressing     Problem: Hemodialysis  Goal: Absence of Infection Signs and Symptoms  Outcome: Progressing

## 2024-07-24 NOTE — NURSING
Nurses Note -- 4 Eyes      07/23/2024   9:55PM      Skin assessed during: Admit      [x] No Altered Skin Integrity Present    []Prevention Measures Documented      [] Yes- Altered Skin Integrity Present or Discovered   [] LDA Added if Not in Epic (Describe Wound)   [] New Altered Skin Integrity was Present on Admit and Documented in LDA   [] Wound Image Taken    Wound Care Consulted? No    Attending Nurse:  Vira Avalos RN/Staff Member: Pj

## 2024-07-24 NOTE — PROGRESS NOTES
[facility] - Cardiology Stepdown  Heart Transplant  Progress Note    Patient Name: Radha Abbott  MRN: [id number]  Admission Date: 7/23/2024  Hospital Length of Stay: 1 days  Attending Physician: Sajan Hurley, *  Primary Care Provider: Vasu Kong MD  Principal Problem:Acute decompensated heart failure    Subjective:   Interval History: Admitted yesterday with volume overload. Diuresed minimally on IV Lasix gtt at 20 mg/hr. Dialysis this morning for volume removal. Will also give 1 unit pRBC during dialysis.    Continuous Infusions:   furosemide (Lasix) 500 mg in 50 mL infusion (conc: 10 mg/mL)  20 mg/hr Intravenous Continuous 2 mL/hr at 07/24/24 0448 20 mg/hr at 07/24/24 0448     Scheduled Meds:   amiodarone  400 mg Oral Daily    atorvastatin  40 mg Oral QHS    gabapentin  100 mg Oral BID    pantoprazole  40 mg Oral Daily    sevelamer carbonate  0.8 g Oral TID WM    warfarin  2.5 mg Oral Daily     PRN Meds:  Current Facility-Administered Medications:     0.9%  NaCl infusion (for blood administration), , Intravenous, Q24H PRN    acetaminophen, 1,000 mg, Oral, Q8H PRN    senna-docusate 8.6-50 mg, 2 tablet, Oral, Daily PRN    traZODone, 25 mg, Oral, Nightly PRN    Review of patient's allergies indicates:  No Known Allergies  Objective:     Vital Signs (Most Recent):  Temp: 97.8 °F (36.6 °C) (07/24/24 1215)  Pulse: 85 (07/24/24 1145)  Resp: 17 (07/24/24 1215)  BP: (!) 96/0 (07/24/24 1215)  SpO2: 95 % (07/24/24 0754) Vital Signs (24h Range):  Temp:  [97.7 °F (36.5 °C)-98.2 °F (36.8 °C)] 97.8 °F (36.6 °C)  Pulse:  [79-94] 85  Resp:  [16-23] 17  SpO2:  [95 %-100 %] 95 %  BP: ()/(0-92) 96/0     Patient Vitals for the past 72 hrs (Last 3 readings):   Weight   07/23/24 2200 83.6 kg (184 lb 4.9 oz)   07/23/24 1114 83.5 kg (184 lb)     Body mass index is 24.32 kg/m².      Intake/Output Summary (Last 24 hours) at 7/24/2024 1347  Last data filed at 7/24/2024 1200  Gross per 24 hour   Intake 1126 ml   Output  3200 ml   Net -2074 ml       Hemodynamic Parameters:              Physical Exam  Vitals and nursing note reviewed.   HENT:      Head: Normocephalic.      Nose: Nose normal.   Eyes:      Conjunctiva/sclera: Conjunctivae normal.   Neck:      Comments: +JVP  Cardiovascular:      Rate and Rhythm: Normal rate.   Pulmonary:      Effort: Pulmonary effort is normal.   Abdominal:      General: Abdomen is flat.      Tenderness: There is no abdominal tenderness.   Musculoskeletal:         General: Normal range of motion.   Skin:     General: Skin is warm.   Neurological:      General: No focal deficit present.      Mental Status: He is alert.   Psychiatric:         Mood and Affect: Mood normal.         Behavior: Behavior normal.         Thought Content: Thought content normal.         Judgment: Judgment normal.            Significant Labs:  CBC:  Recent Labs   Lab 07/23/24  1135 07/23/24  1802 07/24/24  0252   WBC 6.65  --  10.70   RBC 2.43*  --  2.60*   HGB 6.7*  --  7.0*   HCT 22.3* 39 23.1*     --  322   MCV 92  --  89   MCH 27.6  --  26.9*   MCHC 30.0*  --  30.3*     BNP:  Recent Labs   Lab 07/23/24  1135 07/24/24  0252   BNP 1,665* 1,648*     CMP:  Recent Labs   Lab 07/23/24  1135 07/24/24  0252   * 141*   CALCIUM 7.9* 8.5*   ALBUMIN 2.1* 2.1*   PROT 6.9 6.8    138   K 3.2* 3.2*   CO2 23 21*    107   BUN 27* 29*   CREATININE 3.6* 3.8*   ALKPHOS 172* 156*   ALT 26 25   AST 22 20   BILITOT 0.2 0.3      Coagulation:   Recent Labs   Lab 07/22/24  0715 07/23/24  1327 07/24/24  0252   INR 1.8* 1.9* 2.1*   APTT  --   --  42.1*     LDH:  Recent Labs   Lab 07/24/24  0252   *     Microbiology:  Microbiology Results (last 7 days)       ** No results found for the last 168 hours. **            I have reviewed all pertinent labs within the past 24 hours.    Estimated Creatinine Clearance: 22.8 mL/min (A) (based on SCr of 3.8 mg/dL (H)).    Diagnostic Results:  I have reviewed all pertinent imaging  results/findings within the past 24 hours.  Assessment and Plan:     Mr. Radha Abbott is a 65 yo male with stage D HFrEF, ICMP who underwent HM3 placement as DT (12/1/23) w/ a hospital stay complicated by vasoplegia (requiring Giaprezza), debility, malnutrition/impaired swallowing, and renal failure requiring HD w/ recent discharge from rehab on 7/12. Presented to the ED due to worsening weakness, SOB, and LE edema. Pt reports feeling volume overloaded/retaining fluid and has been sleeping in a recliner. Since his discharge from rehab, he was told that he could only remove 1 L of fluid during each HD session, last session was yesterday (MWF). He has also not been taking his  home PO Bumex. Weight at last discharge was 161 lb, weight today is 184 lb. Pt report still making a small amount of urine.    * Acute decompensated heart failure  Discharged from rehab on 7/12 with significant volume retention since. Pt reports weight gain, SOB, abdominal distension, and LE edema. Weight at discharge 161 lb, presented at 184 lb on admission  -IV Lasix gtt at 20 mg/hr   -Nephrology consulted, HD today     Acute renal failure superimposed on stage 4 chronic kidney disease  -HD  -Nephrology consulted, appreciate their assistance     Anemia of chronic disease  -Given 1 unit PRBC with dialysis    LVAD (left ventricular assist device) present  -HeartMate 3 Implanted  12/1/2023  as DT  -Boost Coumadin, Goal INR 2.0-3.0 . Subtherapeutic today.   -Antiplatelets Not on ASA  -LDH is stable overall today. Will continue to monitor daily.  -Speed set at 5000, LSL 4600 rpm  -Interrogation notable for no events  -Not listed for OHTx        Procedure: Device Interrogation Including analysis of device parameters  Current Settings: Ventricular Assist Device  Review of device function is stable/unstable stable        7/24/2024    12:21 PM 7/24/2024     8:41 AM 7/24/2024     4:20 AM 7/23/2024    10:00 PM 7/23/2024     2:01 PM 7/1/2024     7:45 AM  7/1/2024     4:16 AM   TXP LVAD INTERROGATIONS   Type HeartMate3 HeartMate3 HeartMate3 HeartMate3 HeartMate3 HeartMate3 HeartMate3   Flow 3.5 3.3 3.3 3.6 3.5 3.9 4.3   Speed 5100 5000 5000 5000 5000 5000 5050   PI 7.3 7 7.4 6.1 6.1 4.7 3.7   Power ([place]) 3.5 3.4 3.3 3.4 3.4 3.4 3.4   LSL  4600 4600 4600  4600 4600   Low Flow Alarm       none   High Power Alarm       none   Pulsatility Pulse Pulse Pulse Intermittent pulse Intermittent pulse Intermittent pulse Pulse             Vero Lozada PA-C  Heart Transplant  [facility] - Cardiology Stepdown

## 2024-07-25 ENCOUNTER — TELEPHONE (OUTPATIENT)
Dept: TRANSPLANT | Facility: CLINIC | Age: 64
End: 2024-07-25
Payer: MEDICAID

## 2024-07-25 VITALS
WEIGHT: 168.19 LBS | BODY MASS INDEX: 22.29 KG/M2 | HEART RATE: 75 BPM | HEIGHT: 73 IN | TEMPERATURE: 98 F | RESPIRATION RATE: 18 BRPM | OXYGEN SATURATION: 97 % | SYSTOLIC BLOOD PRESSURE: 82 MMHG

## 2024-07-25 LAB
ANION GAP SERPL CALC-SCNC: 8 MMOL/L (ref 8–16)
APTT PPP: 41.7 SEC (ref 21–32)
BASOPHILS # BLD AUTO: 0.03 K/UL (ref 0–0.2)
BASOPHILS NFR BLD: 0.4 % (ref 0–1.9)
BUN SERPL-MCNC: 20 MG/DL (ref 8–23)
CALCIUM SERPL-MCNC: 8.1 MG/DL (ref 8.7–10.5)
CHLORIDE SERPL-SCNC: 104 MMOL/L (ref 95–110)
CO2 SERPL-SCNC: 28 MMOL/L (ref 23–29)
CREAT SERPL-MCNC: 3.3 MG/DL (ref 0.5–1.4)
DIFFERENTIAL METHOD BLD: ABNORMAL
EOSINOPHIL # BLD AUTO: 0.3 K/UL (ref 0–0.5)
EOSINOPHIL NFR BLD: 3.8 % (ref 0–8)
ERYTHROCYTE [DISTWIDTH] IN BLOOD BY AUTOMATED COUNT: 17.4 % (ref 11.5–14.5)
EST. GFR  (NO RACE VARIABLE): 20.3 ML/MIN/1.73 M^2
ESTIMATED AVG GLUCOSE: 126 MG/DL (ref 68–131)
GLUCOSE SERPL-MCNC: 125 MG/DL (ref 70–110)
HBA1C MFR BLD: 6 % (ref 4–5.6)
HCT VFR BLD AUTO: 26.4 % (ref 40–54)
HGB BLD-MCNC: 8.2 G/DL (ref 14–18)
IMM GRANULOCYTES # BLD AUTO: 0.07 K/UL (ref 0–0.04)
IMM GRANULOCYTES NFR BLD AUTO: 0.9 % (ref 0–0.5)
INR PPP: 2.1 (ref 0.8–1.2)
LDH SERPL L TO P-CCNC: 256 U/L (ref 110–260)
LYMPHOCYTES # BLD AUTO: 0.8 K/UL (ref 1–4.8)
LYMPHOCYTES NFR BLD: 9.6 % (ref 18–48)
MAGNESIUM SERPL-MCNC: 1.6 MG/DL (ref 1.6–2.6)
MCH RBC QN AUTO: 27.5 PG (ref 27–31)
MCHC RBC AUTO-ENTMCNC: 31.1 G/DL (ref 32–36)
MCV RBC AUTO: 89 FL (ref 82–98)
MONOCYTES # BLD AUTO: 0.5 K/UL (ref 0.3–1)
MONOCYTES NFR BLD: 6.8 % (ref 4–15)
NEUTROPHILS # BLD AUTO: 6.1 K/UL (ref 1.8–7.7)
NEUTROPHILS NFR BLD: 78.5 % (ref 38–73)
NRBC BLD-RTO: 0 /100 WBC
PHOSPHATE SERPL-MCNC: 2.4 MG/DL (ref 2.7–4.5)
PLATELET # BLD AUTO: 294 K/UL (ref 150–450)
PMV BLD AUTO: 9 FL (ref 9.2–12.9)
POTASSIUM SERPL-SCNC: 3.4 MMOL/L (ref 3.5–5.1)
PROTHROMBIN TIME: 21.5 SEC (ref 9–12.5)
RBC # BLD AUTO: 2.98 M/UL (ref 4.6–6.2)
SODIUM SERPL-SCNC: 140 MMOL/L (ref 136–145)
WBC # BLD AUTO: 7.8 K/UL (ref 3.9–12.7)

## 2024-07-25 PROCEDURE — 97535 SELF CARE MNGMENT TRAINING: CPT

## 2024-07-25 PROCEDURE — 84100 ASSAY OF PHOSPHORUS: CPT | Performed by: STUDENT IN AN ORGANIZED HEALTH CARE EDUCATION/TRAINING PROGRAM

## 2024-07-25 PROCEDURE — 27000248 HC VAD-ADDITIONAL DAY

## 2024-07-25 PROCEDURE — 97116 GAIT TRAINING THERAPY: CPT

## 2024-07-25 PROCEDURE — 85730 THROMBOPLASTIN TIME PARTIAL: CPT | Performed by: STUDENT IN AN ORGANIZED HEALTH CARE EDUCATION/TRAINING PROGRAM

## 2024-07-25 PROCEDURE — 97530 THERAPEUTIC ACTIVITIES: CPT

## 2024-07-25 PROCEDURE — 99233 SBSQ HOSP IP/OBS HIGH 50: CPT | Mod: 95,,, | Performed by: PHYSICIAN ASSISTANT

## 2024-07-25 PROCEDURE — 63600175 PHARM REV CODE 636 W HCPCS: Performed by: PHYSICIAN ASSISTANT

## 2024-07-25 PROCEDURE — 85610 PROTHROMBIN TIME: CPT | Performed by: STUDENT IN AN ORGANIZED HEALTH CARE EDUCATION/TRAINING PROGRAM

## 2024-07-25 PROCEDURE — 80100014 HC HEMODIALYSIS 1:1

## 2024-07-25 PROCEDURE — 83615 LACTATE (LD) (LDH) ENZYME: CPT | Performed by: STUDENT IN AN ORGANIZED HEALTH CARE EDUCATION/TRAINING PROGRAM

## 2024-07-25 PROCEDURE — 85025 COMPLETE CBC W/AUTO DIFF WBC: CPT | Performed by: STUDENT IN AN ORGANIZED HEALTH CARE EDUCATION/TRAINING PROGRAM

## 2024-07-25 PROCEDURE — 83735 ASSAY OF MAGNESIUM: CPT | Performed by: STUDENT IN AN ORGANIZED HEALTH CARE EDUCATION/TRAINING PROGRAM

## 2024-07-25 PROCEDURE — 83036 HEMOGLOBIN GLYCOSYLATED A1C: CPT | Performed by: INTERNAL MEDICINE

## 2024-07-25 PROCEDURE — 25000003 PHARM REV CODE 250: Performed by: PHYSICIAN ASSISTANT

## 2024-07-25 PROCEDURE — 80048 BASIC METABOLIC PNL TOTAL CA: CPT | Performed by: STUDENT IN AN ORGANIZED HEALTH CARE EDUCATION/TRAINING PROGRAM

## 2024-07-25 PROCEDURE — 99232 SBSQ HOSP IP/OBS MODERATE 35: CPT | Mod: ,,, | Performed by: NURSE PRACTITIONER

## 2024-07-25 PROCEDURE — 25000003 PHARM REV CODE 250: Performed by: STUDENT IN AN ORGANIZED HEALTH CARE EDUCATION/TRAINING PROGRAM

## 2024-07-25 PROCEDURE — 93750 INTERROGATION VAD IN PERSON: CPT | Mod: ,,, | Performed by: INTERNAL MEDICINE

## 2024-07-25 RX ORDER — HYDRALAZINE HYDROCHLORIDE 25 MG/1
25 TABLET, FILM COATED ORAL EVERY 8 HOURS
Status: DISCONTINUED | OUTPATIENT
Start: 2024-07-25 | End: 2024-07-25 | Stop reason: HOSPADM

## 2024-07-25 RX ORDER — HYDRALAZINE HYDROCHLORIDE 25 MG/1
25 TABLET, FILM COATED ORAL EVERY 8 HOURS
Qty: 90 TABLET | Refills: 11 | Status: ON HOLD | OUTPATIENT
Start: 2024-07-25 | End: 2025-07-25

## 2024-07-25 RX ADMIN — HYDRALAZINE HYDROCHLORIDE 10 MG: 10 TABLET ORAL at 04:07

## 2024-07-25 RX ADMIN — GABAPENTIN 100 MG: 100 CAPSULE ORAL at 09:07

## 2024-07-25 RX ADMIN — SEVELAMER CARBONATE 0.8 G: 800 POWDER, FOR SUSPENSION ORAL at 09:07

## 2024-07-25 RX ADMIN — PANTOPRAZOLE SODIUM 40 MG: 40 TABLET, DELAYED RELEASE ORAL at 09:07

## 2024-07-25 RX ADMIN — WARFARIN SODIUM 2.5 MG: 2.5 TABLET ORAL at 04:07

## 2024-07-25 RX ADMIN — HYDRALAZINE HYDROCHLORIDE 25 MG: 25 TABLET ORAL at 04:07

## 2024-07-25 RX ADMIN — AMIODARONE HYDROCHLORIDE 400 MG: 200 TABLET ORAL at 09:07

## 2024-07-25 RX ADMIN — FUROSEMIDE 20 MG/HR: 10 INJECTION, SOLUTION INTRAMUSCULAR; INTRAVENOUS at 04:07

## 2024-07-25 RX ADMIN — SEVELAMER CARBONATE 0.8 G: 800 POWDER, FOR SUSPENSION ORAL at 12:07

## 2024-07-25 NOTE — NURSING
Patient has had diarrhea since yesterday until today. He denies having any upset stomach. Vero GUAMAN notified that the patient doesn't meet the criteria for  c-diff test due to less than 48 hrs of being here and antibiotic was on board. Will re assess tomorrow. Vandana Blackwell is aware.

## 2024-07-25 NOTE — ASSESSMENT & PLAN NOTE
-HeartMate 3 Implanted  12/1/2023  as DT  -Boost Coumadin, Goal INR 2.0-3.0 . Subtherapeutic today.   -Antiplatelets Not on ASA  -LDH is stable overall today. Will continue to monitor daily.  -Speed set at 5000, LSL 4600 rpm  -Interrogation notable for no events  -Not listed for OHTx        Procedure: Device Interrogation Including analysis of device parameters  Current Settings: Ventricular Assist Device  Review of device function is stable/unstable stable        7/25/2024     8:09 AM 7/25/2024     5:01 AM 7/24/2024    11:04 PM 7/24/2024     8:15 PM 7/24/2024    12:21 PM 7/24/2024     8:41 AM 7/24/2024     4:20 AM   TXP LVAD INTERROGATIONS   Type HeartMate3 HeartMate3 HeartMate3 HeartMate3 HeartMate3 HeartMate3 HeartMate3   Flow 3.5 3.7 3.9 3.7 3.5 3.3 3.3   Speed 5000 5050 5000 5000 5100 5000 5000   PI 7 7.3 4.5 5.4 7.3 7 7.4   Power ([place]) 3.5 3.5 3.3 3.4 3.5 3.4 3.3   LSL 4600 4600 4600 4600  4600 4600   Pulsatility Intermittent pulse Intermittent pulse Intermittent pulse Pulse Pulse Pulse Pulse

## 2024-07-25 NOTE — PROGRESS NOTES
Arlyn reports correct Warfarin dose, edema in feet(LVAD Team aware), no other changes reported.        PA 

## 2024-07-25 NOTE — SUBJECTIVE & OBJECTIVE
Interval History: Tolerated HD yesterday with removing 2600 L. Repeat HD today and possible discharge with HD tomorrow. Hypertensive overnight after starting PO hydralazine 10 mg TID, will increase to 25 mg TID.     Continuous Infusions:      Scheduled Meds:   amiodarone  400 mg Oral Daily    amoxicillin  500 mg Oral QHS    atorvastatin  40 mg Oral QHS    gabapentin  100 mg Oral BID    hydrALAZINE  10 mg Oral Q8H    pantoprazole  40 mg Oral Daily    sevelamer carbonate  0.8 g Oral TID WM    warfarin  2.5 mg Oral Daily     PRN Meds:  Current Facility-Administered Medications:     0.9%  NaCl infusion (for blood administration), , Intravenous, Q24H PRN    acetaminophen, 1,000 mg, Oral, Q8H PRN    senna-docusate 8.6-50 mg, 2 tablet, Oral, Daily PRN    traZODone, 25 mg, Oral, Nightly PRN    Review of patient's allergies indicates:  No Known Allergies  Objective:     Vital Signs (Most Recent):  Temp: 98.2 °F (36.8 °C) (07/25/24 0718)  Pulse: 86 (07/25/24 1200)  Resp: 18 (07/25/24 0718)  BP: 100/61 (07/25/24 1200)  SpO2: 98 % (07/25/24 0718) Vital Signs (24h Range):  Temp:  [97.5 °F (36.4 °C)-98.6 °F (37 °C)] 98.2 °F (36.8 °C)  Pulse:  [83-93] 86  Resp:  [16-18] 18  SpO2:  [96 %-98 %] 98 %  BP: ()/(0-76) 100/61     Patient Vitals for the past 72 hrs (Last 3 readings):   Weight   07/25/24 0718 76.3 kg (168 lb 3.4 oz)   07/23/24 2200 83.6 kg (184 lb 4.9 oz)   07/23/24 1114 83.5 kg (184 lb)     Body mass index is 22.19 kg/m².      Intake/Output Summary (Last 24 hours) at 7/25/2024 1219  Last data filed at 7/25/2024 1000  Gross per 24 hour   Intake 612 ml   Output 351 ml   Net 261 ml       Hemodynamic Parameters:              Physical Exam  Vitals and nursing note reviewed.   HENT:      Head: Normocephalic.      Nose: Nose normal.   Eyes:      Conjunctiva/sclera: Conjunctivae normal.   Neck:      Comments: +JVP  Cardiovascular:      Rate and Rhythm: Normal rate.   Pulmonary:      Effort: Pulmonary effort is normal.    Abdominal:      General: Abdomen is flat.      Tenderness: There is no abdominal tenderness.   Musculoskeletal:         General: Normal range of motion.   Skin:     General: Skin is warm.   Neurological:      General: No focal deficit present.      Mental Status: He is alert.   Psychiatric:         Mood and Affect: Mood normal.         Behavior: Behavior normal.         Thought Content: Thought content normal.         Judgment: Judgment normal.            Significant Labs:  CBC:  Recent Labs   Lab 07/23/24  1135 07/23/24  1802 07/24/24  0252 07/25/24  0551   WBC 6.65  --  10.70 7.80   RBC 2.43*  --  2.60* 2.98*   HGB 6.7*  --  7.0* 8.2*   HCT 22.3* 39 23.1* 26.4*     --  322 294   MCV 92  --  89 89   MCH 27.6  --  26.9* 27.5   MCHC 30.0*  --  30.3* 31.1*     BNP:  Recent Labs   Lab 07/23/24  1135 07/24/24  0252   BNP 1,665* 1,648*     CMP:  Recent Labs   Lab 07/23/24  1135 07/24/24  0252 07/25/24  0551   * 141* 125*   CALCIUM 7.9* 8.5* 8.1*   ALBUMIN 2.1* 2.1*  --    PROT 6.9 6.8  --     138 140   K 3.2* 3.2* 3.4*   CO2 23 21* 28    107 104   BUN 27* 29* 20   CREATININE 3.6* 3.8* 3.3*   ALKPHOS 172* 156*  --    ALT 26 25  --    AST 22 20  --    BILITOT 0.2 0.3  --       Coagulation:   Recent Labs   Lab 07/23/24  1327 07/24/24  0252 07/25/24  0551   INR 1.9* 2.1* 2.1*   APTT  --  42.1* 41.7*     LDH:  Recent Labs   Lab 07/24/24  0252 07/25/24  0551   * 256     Microbiology:  Microbiology Results (last 7 days)       Procedure Component Value Units Date/Time    Clostridium difficile EIA [[id number]] Collected: 07/25/24 1007    Order Status: Sent Specimen: Stool Updated: 07/25/24 1007            I have reviewed all pertinent labs within the past 24 hours.    Estimated Creatinine Clearance: 25 mL/min (A) (based on SCr of 3.3 mg/dL (H)).    Diagnostic Results:  I have reviewed all pertinent imaging results/findings within the past 24 hours.

## 2024-07-25 NOTE — ASSESSMENT & PLAN NOTE
Discharged from rehab on 7/12 with significant volume retention since. Pt reports weight gain, SOB, abdominal distension, and LE edema. Weight at discharge 161 lb, presented at 184 lb on admission  -IV Lasix gtt at 20 mg/hr , will discontinue as minimal UOP  -Nephrology consulted, HD today

## 2024-07-25 NOTE — NURSING
2015  Assessment completed, see flowsheets.  Currently pt denies pain, nausea,and/or SOB.  Having loose frequent stools-so far 4 today per wife.  Also states was to see ID tomorrow, requesting if they can be consulted while he is admitted.  LEONEL and ISAMAR WNL.  Doppler elevated @ 102.  MD notified & instructed to give Hydralazine early.  Pt and wife aware of POC for this shift.  Will continue to monitor.     2030  attempted to call [facility] on call for something to help loose stools however in an emergency, stated she would return my call shortly.    2330 Spoke to [facility] on call regarding pt loose stools.  No new orders tonight, instructed to endorse to day Team.  Pt sleeping quietly at this time.  No sign of pain or discomfort noted.  Will continue to monitor.     0458  Doppler 92/0-hydralazine given early. Denies needs at this time.  Will continue to monitor    0620  Went to recheck doppler however Wife assisting with bath at this time.  No change in previous assessment.  Will continue to monitor and report to oncoming shift.

## 2024-07-25 NOTE — PROGRESS NOTES
[facility] - [facility]iology Stepdown  Nephrology  Progress Note    Patient Name: Radha Abbott  MRN: [id number]  Admission Date: 7/23/2024  Hospital Length of Stay: 2 days  Attending Provider: Sajan Hurley, *   Primary Care Physician: Vasu Kong MD  Principal Problem:Acute decompensated heart failure    Subjective:     Interval History: HD yesterday, tolerated well. Net UF 2L. 24 .     Review of patient's allergies indicates:  No Known Allergies  Current Facility-Administered Medications   Medication Frequency    0.9%  NaCl infusion (for blood administration) Q24H PRN    acetaminophen tablet 1,000 mg Q8H PRN    amiodarone tablet 400 mg Daily    amoxicillin capsule 500 mg QHS    atorvastatin tablet 40 mg QHS    gabapentin capsule 100 mg BID    hydrALAZINE tablet 10 mg Q8H    pantoprazole EC tablet 40 mg Daily    senna-docusate 8.6-50 mg per tablet 2 tablet Daily PRN    sevelamer carbonate pwpk 0.8 g TID WM    trazodone split tablet 25 mg Nightly PRN    warfarin (COUMADIN) tablet 2.5 mg Daily       Objective:     Vital Signs (Most Recent):  Temp: 98.2 °F (36.8 °C) (07/25/24 0718)  Pulse: 87 (07/25/24 1115)  Resp: 18 (07/25/24 0718)  BP: 101/73 (07/25/24 1115)  SpO2: 98 % (07/25/24 0718) Vital Signs (24h Range):  Temp:  [97.5 °F (36.4 °C)-98.6 °F (37 °C)] 98.2 °F (36.8 °C)  Pulse:  [83-93] 87  Resp:  [16-18] 18  SpO2:  [96 %-98 %] 98 %  BP: ()/(0-76) 101/73     Weight: 76.3 kg (168 lb 3.4 oz) (07/25/24 0718)  Body mass index is 22.19 kg/m².  Body surface area is 1.98 meters squared.    I/O last 3 completed shifts:  In: 1738 [P.O.:1238; I.V.:140; Other:360]  Out: 3550 [Urine:950; Other:2600]     Physical Exam  Vitals and nursing note reviewed.   Pulmonary:      Effort: Pulmonary effort is normal.   Neurological:      Mental Status: He is alert.          Significant Labs:  CBC:   Recent Labs   Lab 07/25/24  0551   WBC 7.80   RBC 2.98*   HGB 8.2*   HCT 26.4*      MCV 89   MCH 27.5   MCHC  31.1*     CMP:   Recent Labs   Lab 07/24/24  0252 07/25/24  0551   * 125*   CALCIUM 8.5* 8.1*   ALBUMIN 2.1*  --    PROT 6.8  --     140   K 3.2* 3.4*   CO2 21* 28    104   BUN 29* 20   CREATININE 3.8* 3.3*   ALKPHOS 156*  --    ALT 25  --    AST 20  --    BILITOT 0.3  --      All labs within the past 24 hours have been reviewed.       Assessment/Plan:     Cardiac/Vascular  * Acute decompensated heart failure  -UF with HD     LVAD (left ventricular assist device) present  -    Renal/  Acute renal failure superimposed on stage 4 chronic kidney disease  63 yo male with hx of LVAD (12/2023) and acute on chronic renal failure started on RRT 6/13 presenting for SOB. Nephrology consulted for HD management in the setting of fluid overload. Was suspected at last hospitalization to have progressed to ESRD, has continued MWF HD outpatient. Last HD session yesterday, 1 L removed.     Nephrology History  -Out[facility] HD [facility]: [facility]  -Nephrologist: ?  -HD tx days: MWF  -HD tx time: ?  -Last HD tx: 7/22/24  -HD access: R CVC  -HD modality: iHD  -Residual urine: +  -EDW:  ?    Plan/Recommendations    ESRD on HD:    -HD yesterday, net UF 2L. Plan for UF only tx today, UF goal 2.5-3L  -Resume outpatient HD schedule tomorrow 7/26  -Will continue iHD thrice weekly unless emergent indication arises  -Strict I&Os  -Pre & post HD weight  Anemia in ESRD  -Hgb goal 10-11, epo with HD   Mineral Bone Disease in ESRD  -Renal diet if not NPO    -resume home phos binders         Thank you for your consult. I will follow-up with patient. Please contact us if you have any additional questions.    Yolanda Cai, RONI  Nephrology  [facility] - Cardiology Stepdown

## 2024-07-25 NOTE — PT/OT/SLP PROGRESS
Physical Therapy   Progress Note    Patient Name:  Radha Abbott  MRN: [id number]    Admit Date: 7/23/2024  Admitting Diagnosis:  Acute decompensated heart failure  Length of Stay: 2 days  Recent Surgery: * No surgery found *      Recommendations:     Discharge Recommendations: low intensity therapy  Equipment recommendations: none  Barriers to discharge: None Identified     Pt safe to ambulate with nsg SBA - RW left in room (hallway ambulation on hold d/t special contact precautions being placed at time of note being written)  Assessment:     aRdha Abbott is a 64 y.o. male admitted to [facility] on 7/23/2024 with medical diagnosis of Acute decompensated heart failure. Pt presents with impaired endurance, decreased coordination, impaired functional mobility. Pt is progressing towards goals, but has not yet reached prior level of function.     Pt agreeable to therapy session. On battery power, up in recliner, when therapists entered room. Pt able to stand from recliner without assist during first trial. Ambulated in hallway with chair follow. Needed 1 seated rest break d/t RLE fatigue, likely d/t increased hip and knee flexion to accommodate for R foot drop (wife is going to bring AFO when she comes back to visit). Able to walk distance back to room. Left sitting in recliner with all VSS. No VAD alarms during session. Emergency bag present during hallway ambulation.    Radha Abbott would benefit from continued acute PT intervention to improve quality of life, focus on recovery of impairments, provide patient/caregiver education, reduce fall risk, and maximize (I) and safety with functional mobility. Once medically stable, recommending pt discharge to low intensity therapy.  Patient continues to demonstrate the need for low intensity therapy on a scheduled basis exhibited by decreased independence with functional mobility .      Rehab Prognosis: Good    Plan:     During this hospitalization, patient to be seen 4 x/week to address  the identified rehab impairments via gait training, therapeutic exercises, therapeutic activities, neuromuscular re-education and progress towards stated goals.     Plan of Care Expires:  08/23/24  Plan of Care reviewed with: patient    This plan of care has been discussed with the patient/caregiver, who was included in its development and is in agreement with the identified goals and treatment plan.     Subjective     Communicated with RN prior to session.  Patient found up in chair upon PT entry to room, agreeable to therapy session. Pt alone during session.    Patient/Family Comments/goals: none stated    Pain/Comfort:  Pain Rating 1: 0/10  Pain Rating Post-Intervention 1: 0/10    Patients cultural, spiritual, Lutheran conflicts given the current situation: None identified     Objective:   OT present for cotreat due to pt's multiple medical comorbidities and functional/cognition deficits requiring two skilled therapists to appropriately progress pt's musculoskeletal strength, neuromuscular control, and endurance while taking into consideration medical acuity and pt safety.    Patient found with: LVAD, telemetry, peripheral IV    General Precautions: Standard, fall, LVAD   Orthopedic Precautions:    Braces:     Oxygen Device: room air    Cognition:  Pt is Alert during session.    Therapist provided skilled verbal and tactile cueing to facilitate the following functional mobility tasks. Listed tasks are focused on recovery of impairments and improving pt's independence and efficiency with bed mobility, transfers and ambulation as able.     Bed Mobility:  Not assessed d/t up in recliner    Transfers:   Sit <> Stand Transfer:   Stand-by Assistance from recliner with no AD  Minimal Assistance from recliner with no AD                  Gait:  Distance: 200 ft + seated rest break + 200 ft  Assistance level: Stand-by Assistance  Assistive Device: rolling walker  Gait Assessment: increased hip and knee flexion to  accommodate for R foot drop    Balance:  Dynamic Sitting: GOOD: Maintains balance through MODERATE excursions of active trunk movement  Standing:  Static: GOOD: Takes MODERATE challenges from all directions   Dynamic: FAIR+: Needs CLOSE SUPERVISION during gait and is able to right self with minor LOB    Outcome Measure: AM-PAC 6 CLICK MOBILITY  Total Score:18     Patient/Caregiver Education and Additional Therapeutic Activities/Exercises       Provided pt/caregiver education regarding:   PT POC and goals for therapy   Safety with mobility and fall risk   Safe management of AD as needed   Energy conservation techniques   Instruction on use of call button and importance of calling nursing staff for assistance with mobility     Patient/caregiver able to verbalize understanding; will follow-up with pt/caregiver during current admit for additional questions/concerns within scope of practice.     White board updated.     Patient left up in chair with all lines intact, call button in reach, and nsg present.    Goals:     Multidisciplinary Problems       Physical Therapy Goals          Problem: Physical Therapy    Goal Priority Disciplines Outcome Goal Variances Interventions   Physical Therapy Goal     PT, PT/OT Progressing     Description: Goals to be met by: 34     Patient will increase functional independence with mobility by performin. Supine to sit with Stand-by Assistance  2. Sit to supine with Stand-by Assistance  3. Sit to stand transfer with Stand-by Assistance  4. Bed to chair transfer with Stand-by Assistance using LRAD  5. Gait  x 150 feet with Stand-by Assistance using Rolling Walker.                          Time Tracking:       PT Received On: 24  PT Start Time: 910     PT Stop Time: 924  PT Total Time (min): 14 min     Billable Minutes: Gait Training 14    2024

## 2024-07-25 NOTE — PT/OT/SLP PROGRESS
Occupational Therapy   Co-Treatment  Co-treatment performed due to patient's multiple deficits requiring two skilled therapists to appropriately and safely assess patient's strength and endurance while facilitating functional tasks in addition to accommodating for patient's activity tolerance.      Name: Radha Abbott  MRN: [id number]  Admitting Diagnosis:  Acute decompensated heart failure       Recommendations:     Discharge Recommendations: Low Intensity Therapy  Discharge Equipment Recommendations:  none  Barriers to discharge:  None    Assessment:     Radha Abbott is a 64 y.o. male with a medical diagnosis of Acute decompensated heart failure.  He presents with the following performance deficits affecting function are impaired endurance, impaired self care skills, impaired functional mobility, impaired cardiopulmonary response to activity. Pt agreeable to therapy and tolerated well. Pt reports switching LVAD wall power> battery and donning vest prior to session. He required minimum-contact guard (A) for all functional activity completed this day without incident. Pt would continue to benefit from skilled OT services to maximize functional independence with ADLs and functional mobility, reduce caregiver burden, and facilitate safe discharge in the least restrictive environment.      Rehab Prognosis:  Good; patient would benefit from acute skilled OT services to address these deficits and reach maximum level of function.       Plan:     Patient to be seen 3 x/week to address the above listed problems via self-care/home management, therapeutic activities, therapeutic exercises, neuromuscular re-education  Plan of Care Expires: 08/23/24  Plan of Care Reviewed with: patient    Subjective     Chief Complaint: not having a RW in his room  Patient/Family Comments/goals: to walk  Pain/Comfort:  Pain Rating 1: 0/10  Pain Rating Post-Intervention 1: 0/10    Objective:     Communicated with: RN prior to session.  Patient found  up in chair with LVAD, telemetry, peripheral IV upon OT entry to room.    General Precautions: Standard, fall, LVAD    Orthopedic Precautions:N/A  Braces: N/A  Respiratory Status: Room air     Occupational Performance:     Functional Mobility/Transfers:  Patient completed Sit <> Stand Transfer with contact guard assistance and minimum assistance  with  rolling walker   Patient completed Bed <> Chair Transfer using Step Transfer technique with contact guard assistance with rolling walker  Patient completed Toilet Transfer Step Transfer technique with contact guard assistance with  rolling walker  Functional Mobility: Pt engaged in functional mobility to simulate household/community distances 300 ft + 10 ft with CGA and RW in order to maximize functional endurance and standing balance required for engagement in occupations of choice   No LOB or SOB noted  1 sitting and 1 standing rest break  Emergency bag present    Activities of Daily Living:  Lower Body Dressing: stand by assistance to doff socks and don b/l shoes sitting in chair  Toileting: stand by assistance to manage pants below rear and hips from toilet (BSC over toilet)      [facility] 6 Click ADL: 19    Treatment & Education:  -Education on energy conservation and task modification to maximize safety and (I) during ADLs and mobility  -Education on importance of OOB activity to improve overall activity tolerance and promote recovery  -Pt educated to call for assistance and to transfer with hospital staff only  -Provided education regarding role of OT, POC, & discharge recommendations with pt verbalizing understanding.  Pt had no further questions & when asked whether there were any concerns pt reported none.     Patient left  sitting on toilet  with all lines intact, call button in reach, and RN/PCT notified    GOALS:   Multidisciplinary Problems       Occupational Therapy Goals          Problem: Occupational Therapy    Goal Priority Disciplines Outcome  Interventions   Occupational Therapy Goal     OT, PT/OT Progressing    Description: Goals to be met by: 8/23/24     Patient will increase functional independence with ADLs by performing:    LE Dressing with Minimal Assistance.  Grooming while standing at sink with Modified [place].  Toileting from toilet/bedside commode with Modified [place] for hygiene and clothing management.   Supine to sit with Modified [place].  Toilet transfer to toilet/bedside commode with Modified [place] and LRAD prn.  Upper extremity exercise program x15 reps per handout, with independence.                         Time Tracking:     OT Date of Treatment: 07/25/24  OT Start Time: 0910  OT Stop Time: 0937  OT Total Time (min): 27 min    Billable Minutes:Self Care/Home Management 15  Therapeutic Activity 12    OT/PRIETO: OT          7/25/2024

## 2024-07-25 NOTE — NURSING
Rounded on patient, wife on the phone. Patient about to start HD session. Possibility of discharging home tomorrow. Wife had questions: Will he continue HD MWF despite getting HD today? Will he discharge today or tomorrow? Will he get Bumex when discharged?      I informed wife that my assumption is he will stay on MWF HD schedule and go home tomorrow. Regarding Bumex, I will have to ask the providers. I passed along these questions to DENIZ Miles.     BU controller charged and self test passed.

## 2024-07-25 NOTE — PROGRESS NOTES
Admit/Discharge Note     Met with patient to assess needs. Patient is a 64 y.o.  male, admitted for Shortness of breath [R06.02], LVAD (left ventricular assist device) present (12/1/23)  Acute decompensated heart failure [I50.9]  with a history of Stage D HFrEF, acute renal failure superimposed on stage 4 CKD requiring dialysis.    Patient admitted  to [facility] on 7/23/2024 .  At this time, patient presents as alert and oriented x 4 and cooperative.  At this time, patients caregiver was the phone with patient during the duration of the assessment.     Household/Family Systems     Patient resides with patient's spouse,     Address:  [address] [place] [id number].    Patient phone number: [phone]    Emergency contact:  Arlyn Abbott, spouse. ([phone] home), ([phone], work), ([phone], cell)    Alternate family contact: Teetee Zhou, sister: [phone]; [phone]    Support system includes patient's wife, siblings, and adult children.  Patient does not have dependents that are need of being cared for.     Patients primary caregiver is Arlyn Abbott, patients spouse.    During admission, patient's caregiver plans to stay at home.  Confirmed patient and patients caregivers do have access to reliable transportation.    Cognitive Status/Learning     Patient reports reading ability as 12th grade and states patient does not have difficulty with reading, writing, seeing, hearing, comprehension, learning, and memory.  Patient reports patient learns best by one on one support.   Needed: No.   Highest education level: High School (9-12) or GED    Vocation/Disability   .  Working for Income: No  If no, reason not working: Disability  Patient is  disabled due to heart issues since 2009.  Patient's spouse works at [employer] as a patient access kirstin    Adherence     Patient reports a high level of adherence to patients health care regimen.  Adherence counseling and  education provided. Patient verbalizes understanding.    Substance Use    Patient reports the following substance usage.    Tobacco:  No current use . Last use   Alcohol: none, patient denies any use.  Illicit Drugs/Non-prescribed Medications: none, patient denies any use.  Patient states clear understanding of the potential impact of substance use.  Substance abstinence/cessation counseling, education and resources provided and reviewed.     Services Utilizing/ADLS    Infusion Service: Prior to admission, patient utilizing? no. Patient has used [facility] in the past for IV abx and use them again if needed  Home Health: Prior to admission, patient utilizing? no. Patient has used [facility] in the past and would use them again if needed  DME: Prior to admission, yes, [facility], [facility]  Pulmonary/Cardiac Rehab: Prior to admission, no  Dialysis:  Prior to admission, yes, patient utilizes dialysis M, W, F at [facility] ([facility] location, [phone])  Transplant Specialty Pharmacy:  Prior to admission, no.    Prior to admission, patient reports patient was independent with ADLS and was not driving.  Patient reports patient is not able to care for self at this time due to compromised medical condition (as documented in medical record) and physical weakness..  Patient indicates a willingness to care for self once medically cleared to do so.    Insurance/Medications    Insured by   Payer/Plan Subscr  Sex Relation Sub. Ins. ID Effective Group Num   1. MEDICAID - ALISON BUCK 1960 Male [facility] [id number]                                   [address] [address]   2[facility] ZENDE[facility] 1960 Male Self [id number] 10/9/23                                    PO       Primary Insurance (for UNOS reporting): Public Insurance - Medicaid  Secondary Insurance (for UNOS reporting): None    Patient reports patient is able to obtain and afford medications at this time and at time of discharge.    Living  Will/Healthcare Power of     Patient states patient has a LW and/or HCPA.      Coping/Mental Health    Patient is coping adequately with the aid of  family members. Patient denies mental health difficulties. Patient does have a history of anxiety and depression related to his medical issues.    Discharge Planning    At time of discharge, patient plans to return to patient's home under the care of his spouse, Arlyn.  Patients spouse will transport patient.  Per rounds today, expected discharge date is 7/25/24 . Patient and patients caregiver  verbalize understanding and are involved in treatment planning and discharge process.    Additional Concerns    Patient's caretaker denies additional needs and/or concerns at this time. Patient is being followed for needs, education, resources, information, emotional support, supportive counseling, and for supportive and skilled discharge plan of care.  providing ongoing psychosocial support, education, resources and d/c planning as needed.  SW remains available. Patient's caregiver verbalizes understanding and agreement with information reviewed,  availability and how to access available resources as needed. Patient denies additional needs and/or concerns at this time. Patient verbalizes understanding and agreement with information reviewed, social work availability, and how to access available resources as needed.

## 2024-07-25 NOTE — HOSPITAL COURSE
Admitted from ED for volume overload. Initially placed on IV Lasix gtt at 40 mg/hr but patient has minimal urine output so diuretics discontinued. Nephrology consulted and underwent two HD sessions with improvement in symptoms. Nephrology organized outpatient HD for tomorrow with higher UF so more fluid is removed with each HD session. Of note, patient was hypertensive with Dopplers in 90-100s, which improved with hydralazine 25 mg TID. Follow up in LVAD clinic in 2-3 weeks.

## 2024-07-25 NOTE — PLAN OF CARE
Problem: Adult Inpatient Plan of Care  Goal: Plan of Care Review  Outcome: Progressing  Goal: Patient-Specific Goal (Individualized)  Outcome: Progressing  Goal: Absence of Hospital-Acquired Illness or Injury  Outcome: Progressing  Goal: Optimal Comfort and Wellbeing  Outcome: Progressing  Goal: Readiness for Transition of Care  Outcome: Progressing     Problem: Diabetes Comorbidity  Goal: Blood Glucose Level Within Targeted Range  Outcome: Progressing     Problem: Acute Kidney Injury/Impairment  Goal: Fluid and Electrolyte Balance  Outcome: Progressing  Goal: Improved Oral Intake  Outcome: Progressing  Goal: Effective Renal Function  Outcome: Progressing     Problem: Infection  Goal: Absence of Infection Signs and Symptoms  Outcome: Progressing     Problem: Wound  Goal: Optimal Coping  Outcome: Progressing  Goal: Optimal Functional Ability  Outcome: Progressing  Goal: Absence of Infection Signs and Symptoms  Outcome: Progressing  Goal: Improved Oral Intake  Outcome: Progressing  Goal: Optimal Pain Control and Function  Outcome: Progressing  Goal: Skin Health and Integrity  Outcome: Progressing  Goal: Optimal Wound Healing  Outcome: Progressing     Problem: Ventricular Assist Device  Goal: Optimal Adjustment to Device  Outcome: Progressing  Goal: Absence of Bleeding  Outcome: Progressing  Goal: Absence of Embolism Signs and Symptoms  Outcome: Progressing  Goal: Optimal Blood Flow  Outcome: Progressing  Goal: Absence of Infection Signs and Symptoms  Outcome: Progressing  Goal: Effective Right-Sided Heart Function  Outcome: Progressing     Problem: Hemodialysis  Goal: Safe, Effective Therapy Delivery  Outcome: Progressing  Goal: Effective Tissue Perfusion  Outcome: Progressing  Goal: Absence of Infection Signs and Symptoms  Outcome: Progressing     Problem: Skin Injury Risk Increased  Goal: Skin Health and Integrity  Outcome: Progressing

## 2024-07-25 NOTE — ASSESSMENT & PLAN NOTE
65 yo male with hx of LVAD (12/2023) and acute on chronic renal failure started on RRT 6/13 presenting for SOB. Nephrology consulted for HD management in the setting of fluid overload. Was suspected at last hospitalization to have progressed to ESRD, has continued MWF HD outpatient. Last HD session yesterday, 1 L removed.     Nephrology History  -Outpatient HD unit: [facility]  -Nephrologist: ?  -HD tx days: MWF  -HD tx time: ?  -Last HD tx: 7/22/24  -HD access: R CVC  -HD modality: iHD  -Residual urine: +  -EDW:  ?    Plan/Recommendations    ESRD on HD:    -HD yesterday, net UF 2L. Plan for UF only tx today, UF goal 2.5-3L  -Resume outpatient HD schedule tomorrow 7/26  -Will continue iHD thrice weekly unless emergent indication arises  -Strict I&Os  -Pre & post HD weight  Anemia in ESRD  -Hgb goal 10-11, epo with HD   Mineral Bone Disease in ESRD  -Renal diet if not NPO    -resume home phos binders

## 2024-07-25 NOTE — PROGRESS NOTES
Arrived patient's room for bedside HD TX.  Received report from primary nurse.  Vital signs stable  HD TX started via RIJ cvc.  Ultra filtration only per Nephrology order.

## 2024-07-25 NOTE — PLAN OF CARE
Problem: Adult Inpatient Plan of Care  Goal: Plan of Care Review  Outcome: Progressing  Flowsheets (Taken 7/24/2024 2015)  Plan of Care Reviewed With:   patient   spouse  Goal: Optimal Comfort and Wellbeing  Outcome: Progressing  Intervention: Monitor Pain and Promote Comfort  Flowsheets (Taken 7/24/2024 2015)  Pain Management Interventions:   care clustered   position adjusted   relaxation techniques promoted   quiet environment facilitated   pain management plan reviewed with patient/caregiver     Problem: Acute Kidney Injury/Impairment  Goal: Fluid and Electrolyte Balance  Outcome: Progressing  Intervention: Monitor and Manage Fluid and Electrolyte Balance  Flowsheets (Taken 7/24/2024 2015)  Fluid/Electrolyte Management: (1.5L/day fluid restriction. strict intake/output and daily weights monitored)   fluids restricted   other (see comments)

## 2024-07-25 NOTE — TELEPHONE ENCOUNTER
Called wife to review POC: D/C today, keep MWF HD outpatient including session tomorrow, and no Bumex per DENIZ Miles as it is not helping him.     CHANG Barry RN and KEARA Khan CM have emailed Nephrology and HD notes to Fernando at [facility].

## 2024-07-25 NOTE — PROGRESS NOTES
DISCHARGE NOTE:    Radha Abbott is a 64 y.o. male s/p HM3 lvad from 12.1.23 was admitted for evaluation of volume overload.     Past Medical History:   Diagnosis Date    Aspiration pneumonia of both lungs 06/17/2024    CAD (coronary artery disease)     CHF (congestive heart failure)     Delirium 06/16/2024    Diabetes mellitus     HFrEF (heart failure with reduced ejection fraction)     Hypoglycemia 06/12/2024    ICD (implantable cardioverter-defibrillator) in place     MI, old     Type 2 diabetes mellitus without complication, without long-term current use of insulin 10/07/2023       Hospital Course: During his hospital stay Mr. Abbott was evaluated by nephrology and underwent iHD. Plan to resume his outpatient schedule MWF.     His inr today is 2.5. Plan to continue warfarin 2.5mg orally daily on discharge.     Pharmacy Interventions/Recommendations:  1) INR Goal: 2-3    2) Antiplatelet Agents: None    3) Heparin Bridging:  UFH     4) INR Follow-Up/Discharge Needs:  Monday with Coumadin Clinic     See list of discharge medication for dosing instructions.     Radha Abbott and his caregiver verbalized their understanding and had the opportunity to ask questions.      Discharge Medications:     Medication List        START taking these medications      hydrALAZINE 25 MG tablet  Commonly known as: APRESOLINE  Take 1 tablet (25 mg total) by mouth every 8 (eight) hours.            CONTINUE taking these medications      acetaminophen 500 MG tablet  Commonly known as: TYLENOL  Take 2 tablets (1,000 mg total) by mouth every 8 (eight) hours as needed for Pain.     amiodarone 400 MG tablet  Commonly known as: PACERONE  Take 1 tablet (400 mg total) by mouth once daily.     amoxicillin 500 MG capsule  Commonly known as: AMOXIL  Take 1 capsule (500 mg total) by mouth once daily.     atorvastatin 40 MG tablet  Commonly known as: LIPITOR  Take 1 tablet (40 mg total) by mouth every evening.     gabapentin 100 MG capsule  Commonly  known as: NEURONTIN  Take 1 capsule (100 mg total) by mouth 2 (two) times daily.     omega-3 acid ethyl esters 1 gram capsule  Commonly known as: LOVAZA  Take 2 capsules (2 g total) by mouth 2 (two) times daily.     pantoprazole 40 MG tablet  Commonly known as: PROTONIX  Take 1 tablet (40 mg total) by mouth once daily.     pulse oximeter device  Commonly known as: pulse oximeter  by Apply Externally route 2 (two) times a day. Use twice daily at 8 AM and 3 PM and record the value in [facility] as directed.     senna-docusate 8.6-50 mg 8.6-50 mg per tablet  Commonly known as: PERICOLACE  Take 2 tablets by mouth daily as needed for Constipation.     traZODone 50 MG tablet  Commonly known as: DESYREL  Take 0.5 tablets (25 mg total) by mouth every evening.     venlafaxine 37.5 MG Tab  Commonly known as: EFFEXOR  Take 1 tablet (37.5 mg total) by mouth once daily.     vitamin D 1000 units Tab  Commonly known as: VITAMIN D3  Take 1 tablet (1,000 Units total) by mouth once daily.     warfarin 2.5 MG tablet  Commonly known as: COUMADIN  Take 1 tablet (2.5 mg total) by mouth Daily.            STOP taking these medications      bumetanide 2 MG tablet  Commonly known as: BUMEX     insulin aspart U-100 100 unit/mL (3 mL) Inpn pen  Commonly known as: NovoLOG     midodrine 10 MG tablet  Commonly known as: PROAMATINE     sevelamer carbonate 0.8 gram Pwpk  Commonly known as: RENVELA     sodium bicarbonate 650 MG tablet               Where to Get Your Medications        These medications were sent to [facility] - [place] - [address]  [address] [address]      Phone: [phone]   hydrALAZINE 25 MG tablet

## 2024-07-25 NOTE — PROGRESS NOTES
[facility] - Cardiology Stepdown  Heart Transplant  Progress Note    Patient Name: Radha Abbott  MRN: [id number]  Admission Date: 7/23/2024  Hospital Length of Stay: 2 days  Attending Physician: Sajan Hurley, *  Primary Care Provider: Vasu Kong MD  Principal Problem:Acute decompensated heart failure    Subjective:   Interval History: Tolerated HD yesterday with removing 2600 L. Repeat HD today and possible discharge with HD tomorrow. Hypertensive overnight after starting PO hydralazine 10 mg TID, will increase to 25 mg TID.     Continuous Infusions:      Scheduled Meds:   amiodarone  400 mg Oral Daily    amoxicillin  500 mg Oral QHS    atorvastatin  40 mg Oral QHS    gabapentin  100 mg Oral BID    hydrALAZINE  10 mg Oral Q8H    pantoprazole  40 mg Oral Daily    sevelamer carbonate  0.8 g Oral TID WM    warfarin  2.5 mg Oral Daily     PRN Meds:  Current Facility-Administered Medications:     0.9%  NaCl infusion (for blood administration), , Intravenous, Q24H PRN    acetaminophen, 1,000 mg, Oral, Q8H PRN    senna-docusate 8.6-50 mg, 2 tablet, Oral, Daily PRN    traZODone, 25 mg, Oral, Nightly PRN    Review of patient's allergies indicates:  No Known Allergies  Objective:     Vital Signs (Most Recent):  Temp: 98.2 °F (36.8 °C) (07/25/24 0718)  Pulse: 86 (07/25/24 1200)  Resp: 18 (07/25/24 0718)  BP: 100/61 (07/25/24 1200)  SpO2: 98 % (07/25/24 0718) Vital Signs (24h Range):  Temp:  [97.5 °F (36.4 °C)-98.6 °F (37 °C)] 98.2 °F (36.8 °C)  Pulse:  [83-93] 86  Resp:  [16-18] 18  SpO2:  [96 %-98 %] 98 %  BP: ()/(0-76) 100/61     Patient Vitals for the past 72 hrs (Last 3 readings):   Weight   07/25/24 0718 76.3 kg (168 lb 3.4 oz)   07/23/24 2200 83.6 kg (184 lb 4.9 oz)   07/23/24 1114 83.5 kg (184 lb)     Body mass index is 22.19 kg/m².      Intake/Output Summary (Last 24 hours) at 7/25/2024 1219  Last data filed at 7/25/2024 1000  Gross per 24 hour   Intake 612 ml   Output 351 ml   Net 261 ml        Hemodynamic Parameters:              Physical Exam  Vitals and nursing note reviewed.   HENT:      Head: Normocephalic.      Nose: Nose normal.   Eyes:      Conjunctiva/sclera: Conjunctivae normal.   Neck:      Comments: +JVP  Cardiovascular:      Rate and Rhythm: Normal rate.   Pulmonary:      Effort: Pulmonary effort is normal.   Abdominal:      General: Abdomen is flat.      Tenderness: There is no abdominal tenderness.   Musculoskeletal:         General: Normal range of motion.   Skin:     General: Skin is warm.   Neurological:      General: No focal deficit present.      Mental Status: He is alert.   Psychiatric:         Mood and Affect: Mood normal.         Behavior: Behavior normal.         Thought Content: Thought content normal.         Judgment: Judgment normal.            Significant Labs:  CBC:  Recent Labs   Lab 07/23/24  1135 07/23/24  1802 07/24/24  0252 07/25/24  0551   WBC 6.65  --  10.70 7.80   RBC 2.43*  --  2.60* 2.98*   HGB 6.7*  --  7.0* 8.2*   HCT 22.3* 39 23.1* 26.4*     --  322 294   MCV 92  --  89 89   MCH 27.6  --  26.9* 27.5   MCHC 30.0*  --  30.3* 31.1*     BNP:  Recent Labs   Lab 07/23/24  1135 07/24/24  0252   BNP 1,665* 1,648*     CMP:  Recent Labs   Lab 07/23/24  1135 07/24/24  0252 07/25/24  0551   * 141* 125*   CALCIUM 7.9* 8.5* 8.1*   ALBUMIN 2.1* 2.1*  --    PROT 6.9 6.8  --     138 140   K 3.2* 3.2* 3.4*   CO2 23 21* 28    107 104   BUN 27* 29* 20   CREATININE 3.6* 3.8* 3.3*   ALKPHOS 172* 156*  --    ALT 26 25  --    AST 22 20  --    BILITOT 0.2 0.3  --       Coagulation:   Recent Labs   Lab 07/23/24  1327 07/24/24  0252 07/25/24  0551   INR 1.9* 2.1* 2.1*   APTT  --  42.1* 41.7*     LDH:  Recent Labs   Lab 07/24/24  0252 07/25/24  0551   * 256     Microbiology:  Microbiology Results (last 7 days)       Procedure Component Value Units Date/Time    Clostridium difficile EIA [[id number]] Collected: 07/25/24 1007    Order Status: Sent  Specimen: Stool Updated: 07/25/24 1007            I have reviewed all pertinent labs within the past 24 hours.    Estimated Creatinine Clearance: 25 mL/min (A) (based on SCr of 3.3 mg/dL (H)).    Diagnostic Results:  I have reviewed all pertinent imaging results/findings within the past 24 hours.  Assessment and Plan:     Mr. Radha Abbott is a 63 yo male with stage D HFrEF, ICMP who underwent HM3 placement as DT (12/1/23) w/ a hospital stay complicated by vasoplegia (requiring Giaprezza), debility, malnutrition/impaired swallowing, and renal failure requiring HD w/ recent discharge from rehab on 7/12. Presented to the ED due to worsening weakness, SOB, and LE edema. Pt reports feeling volume overloaded/retaining fluid and has been sleeping in a recliner. Since his discharge from rehab, he was told that he could only remove 1 L of fluid during each HD session, last session was yesterday (MWF). He has also not been taking his  home PO Bumex. Weight at last discharge was 161 lb, weight today is 184 lb. Pt report still making a small amount of urine.    * Acute decompensated heart failure  Discharged from rehab on 7/12 with significant volume retention since. Pt reports weight gain, SOB, abdominal distension, and LE edema. Weight at discharge 161 lb, presented at 184 lb on admission  -IV Lasix gtt at 20 mg/hr , will discontinue as minimal UOP  -Nephrology consulted, HD today     Acute renal failure superimposed on stage 4 chronic kidney disease  -HD MWF  -Nephrology consulted, appreciate their assistance     Anemia of chronic disease  -Given 1 unit PRBC with dialysis    LVAD (left ventricular assist device) present  -HeartMate 3 Implanted  12/1/2023  as DT  -Boost Coumadin, Goal INR 2.0-3.0 . Subtherapeutic today.   -Antiplatelets Not on ASA  -LDH is stable overall today. Will continue to monitor daily.  -Speed set at 5000, LSL 4600 rpm  -Interrogation notable for no events  -Not listed for OHTx        Procedure: Device  Interrogation Including analysis of device parameters  Current Settings: Ventricular Assist Device  Review of device function is stable/unstable stable        7/25/2024     8:09 AM 7/25/2024     5:01 AM 7/24/2024    11:04 PM 7/24/2024     8:15 PM 7/24/2024    12:21 PM 7/24/2024     8:41 AM 7/24/2024     4:20 AM   TXP LVAD INTERROGATIONS   Type HeartMate3 HeartMate3 HeartMate3 HeartMate3 HeartMate3 HeartMate3 HeartMate3   Flow 3.5 3.7 3.9 3.7 3.5 3.3 3.3   Speed 5000 5050 5000 5000 5100 5000 5000   PI 7 7.3 4.5 5.4 7.3 7 7.4   Power ([place]) 3.5 3.5 3.3 3.4 3.5 3.4 3.3   LSL 4600 4600 4600 4600  4600 4600   Pulsatility Intermittent pulse Intermittent pulse Intermittent pulse Pulse Pulse Pulse Pulse             Vero Lozada PA-C  Heart Transplant  [facility] - Cardiology Step[facility]

## 2024-07-25 NOTE — SUBJECTIVE & OBJECTIVE
Interval History: HD yesterday, tolerated well. Net UF 2L. 24 .     Review of patient's allergies indicates:  No Known Allergies  Current Facility-Administered Medications   Medication Frequency    0.9%  NaCl infusion (for blood administration) Q24H PRN    acetaminophen tablet 1,000 mg Q8H PRN    amiodarone tablet 400 mg Daily    amoxicillin capsule 500 mg QHS    atorvastatin tablet 40 mg QHS    gabapentin capsule 100 mg BID    hydrALAZINE tablet 10 mg Q8H    pantoprazole EC tablet 40 mg Daily    senna-docusate 8.6-50 mg per tablet 2 tablet Daily PRN    sevelamer carbonate pwpk 0.8 g TID WM    trazodone split tablet 25 mg Nightly PRN    warfarin (COUMADIN) tablet 2.5 mg Daily       Objective:     Vital Signs (Most Recent):  Temp: 98.2 °F (36.8 °C) (07/25/24 0718)  Pulse: 87 (07/25/24 1115)  Resp: 18 (07/25/24 0718)  BP: 101/73 (07/25/24 1115)  SpO2: 98 % (07/25/24 0718) Vital Signs (24h Range):  Temp:  [97.5 °F (36.4 °C)-98.6 °F (37 °C)] 98.2 °F (36.8 °C)  Pulse:  [83-93] 87  Resp:  [16-18] 18  SpO2:  [96 %-98 %] 98 %  BP: ()/(0-76) 101/73     Weight: 76.3 kg (168 lb 3.4 oz) (07/25/24 0718)  Body mass index is 22.19 kg/m².  Body surface area is 1.98 meters squared.    I/O last 3 completed shifts:  In: 1738 [P.O.:1238; I.V.:140; Other:360]  Out: 3550 [Urine:950; Other:2600]     Physical Exam  Vitals and nursing note reviewed.   Pulmonary:      Effort: Pulmonary effort is normal.   Neurological:      Mental Status: He is alert.          Significant Labs:  CBC:   Recent Labs   Lab 07/25/24  0551   WBC 7.80   RBC 2.98*   HGB 8.2*   HCT 26.4*      MCV 89   MCH 27.5   MCHC 31.1*     CMP:   Recent Labs   Lab 07/24/24  0252 07/25/24  0551   * 125*   CALCIUM 8.5* 8.1*   ALBUMIN 2.1*  --    PROT 6.8  --     140   K 3.2* 3.4*   CO2 21* 28    104   BUN 29* 20   CREATININE 3.8* 3.3*   ALKPHOS 156*  --    ALT 25  --    AST 20  --    BILITOT 0.3  --      All labs within the past 24 hours have  been reviewed.

## 2024-07-25 NOTE — DISCHARGE SUMMARY
[facility] - Cardiology Stepdown  Heart Transplant  Discharge Summary      Patient Name: Radha Abbott  MRN: [id number]  Admission Date: 7/23/2024  Hospital Length of Stay: 2 days  Discharge Date and Time: 07/25/2024 3:45 PM  Attending Physician: Sajan Hurley, *   Discharging Provider: Vero Lozada PA-C  Primary Care Provider: Vasu Kong MD     HPI: Mr. Radha Abbott is a 63 yo male with stage D HFrEF, ICMP who underwent HM3 placement as DT (12/1/23) w/ a hospital stay complicated by vasoplegia (requiring Giaprezza), debility, malnutrition/impaired swallowing, and renal failure requiring HD w/ recent discharge from rehab on 7/12. Presented to the ED due to worsening weakness, SOB, and LE edema. Pt reports feeling volume overloaded/retaining fluid and has been sleeping in a recliner. Since his discharge from rehab, he was told that he could only remove 1 L of fluid during each HD session, last session was yesterday (MWF). He has also not been taking his  home PO Bumex. Weight at last discharge was 161 lb, weight today is 184 lb. Pt report still making a small amount of urine.    * No surgery found *     Hospital Course: Admitted from ED for volume overload. Initially placed on IV Lasix gtt at 40 mg/hr but patient has minimal urine output so diuretics discontinued. Nephrology consulted and underwent two HD sessions with improvement in symptoms. Nephrology organized outpatient HD for tomorrow with higher UF so more fluid is removed with each HD session. Of note, patient was hypertensive with Dopplers in 90-100s, which improved with hydralazine 25 mg TID. Follow up in LVAD clinic in 2-3 weeks.     Goals of Care Treatment Preferences:  Code Status: Full Code    Health care agent: Pt's wife, Arlyn is NOK  Health care agent number: No value filed.                   Consults (From admission, onward)          Status Ordering Provider     Inpatient consult to Registered Dietitian/Nutritionist  Once         Provider:  (Not yet assigned)    Completed BROCK CASTRO     Inpatient consult to Nephrology  Once        Provider:  (Not yet assigned)    Completed BROCK CASTRO     Inpatient consult to Cardiology  Once        Provider:  (Not yet assigned)    Completed CEDRIC BUCHANAN Diagnostic Studies: Labs: All labs within the past 24 hours have been reviewed    Pending Diagnostic Studies:       None          Final Active Diagnoses:    Diagnosis Date Noted POA    PRINCIPAL PROBLEM:  Acute decompensated heart failure [I50.9] 03/11/2024 Yes    Acute renal failure superimposed on stage 4 chronic kidney disease [N17.9, N18.4] 05/29/2024 Yes    Anemia of chronic disease [D63.8] 04/26/2024 Yes    LVAD (left ventricular assist device) present [Z95.811] 12/01/2023 Not Applicable      Problems Resolved During this Admission:      Discharged Condition: stable    Disposition: Home or Self Care    Follow Up:    Patient Instructions:   No discharge procedures on file.  Medications:  Reconciled Home Medications:      Medication List        START taking these medications      hydrALAZINE 25 MG tablet  Commonly known as: APRESOLINE  Take 1 tablet (25 mg total) by mouth every 8 (eight) hours.            CONTINUE taking these medications      acetaminophen 500 MG tablet  Commonly known as: TYLENOL  Take 2 tablets (1,000 mg total) by mouth every 8 (eight) hours as needed for Pain.     amiodarone 400 MG tablet  Commonly known as: PACERONE  Take 1 tablet (400 mg total) by mouth once daily.     amoxicillin 500 MG capsule  Commonly known as: AMOXIL  Take 1 capsule (500 mg total) by mouth once daily.     atorvastatin 40 MG tablet  Commonly known as: LIPITOR  Take 1 tablet (40 mg total) by mouth every evening.     gabapentin 100 MG capsule  Commonly known as: NEURONTIN  Take 1 capsule (100 mg total) by mouth 2 (two) times daily.     omega-3 acid ethyl esters 1 gram capsule  Commonly known as:  LOVAZA  Take 2 capsules (2 g total) by mouth 2 (two) times daily.     pantoprazole 40 MG tablet  Commonly known as: PROTONIX  Take 1 tablet (40 mg total) by mouth once daily.     pulse oximeter device  Commonly known as: pulse oximeter  by Apply Externally route 2 (two) times a day. Use twice daily at 8 AM and 3 PM and [facility] the [facility] as directed.     senna-docusate 8.6-50 mg 8.6-50 mg per tablet  Commonly known as: PERICOLACE  Take 2 tablets by mouth daily as needed for Constipation.     traZODone 50 MG tablet  Commonly known as: DESYREL  Take 0.5 tablets (25 mg total) by mouth every evening.     venlafaxine 37.5 MG Tab  Commonly known as: EFFEXOR  Take 1 tablet (37.5 mg total) by mouth once daily.     vitamin D 1000 units Tab  Commonly known as: VITAMIN D3  Take 1 tablet (1,000 Units total) by mouth once daily.     warfarin 2.5 MG tablet  Commonly known as: COUMADIN  Take 1 tablet (2.5 mg total) by mouth Daily.            STOP taking these medications      bumetanide 2 MG tablet  Commonly known as: BUMEX     insulin aspart U-100 100 unit/mL (3 mL) Inpn pen  Commonly known as: NovoLOG     midodrine 10 MG tablet  Commonly known as: PROAMATINE     sevelamer carbonate 0.8 gram Pwpk  Commonly known as: RENVELA     sodium bicarbonate 650 MG tablet              Vero Lozada PA-C  Heart Transplant  [facility] - Cardiology Stepdown

## 2024-07-25 NOTE — PROGRESS NOTES
07/25/2024  Erasmo Hooper    Current provider:  Sajan Hurley, *    Device interrogation:      7/25/2024     1:01 PM 7/25/2024     8:09 AM 7/25/2024     5:01 AM 7/24/2024    11:04 PM 7/24/2024     8:15 PM 7/24/2024    12:21 PM 7/24/2024     8:41 AM   TXP LVAD INTERROGATIONS   Type HeartMate3 HeartMate3 HeartMate3 HeartMate3 HeartMate3 HeartMate3 HeartMate3   Flow 3.9 3.5 3.7 3.9 3.7 3.5 3.3   Speed 5000 5000 5050 5000 5000 5100 5000   PI 4.5 7 7.3 4.5 5.4 7.3 7   Power ([place]) 3.5 3.5 3.5 3.3 3.4 3.5 3.4   LSL 4600 4600 4600 4600 4600  4600   Pulsatility  Intermittent pulse Intermittent pulse Intermittent pulse Pulse Pulse Pulse          Rounded on [facility] to ensure all mechanical assist device settings (IABP or VAD) were appropriate and all parameters were within limits.  I was able to ensure all back up equipment was present, the staff had no issues, and the Perfusion Department daily rounding was complete.      For implantable VADs: Interrogation of Ventricular assist device was performed with analysis of device parameters and review of device function. I have personally reviewed the interrogation findings and agree with findings as stated.     In emergency, the nursing units have been notified to contact the perfusion department either by:  Calling [id number] from 630am to 4pm Mon thru Fri, utilizing the On-Call Finder functionality of Epic and searching for Perfusion, or by contacting the hospital  from 4pm to 630am and on weekends and asking to speak with the perfusionist on call.    1:39 PM

## 2024-07-26 ENCOUNTER — ANTI-COAG VISIT (OUTPATIENT)
Dept: CARDIOLOGY | Facility: CLINIC | Age: 64
End: 2024-07-26
Payer: MEDICAID

## 2024-07-26 ENCOUNTER — SOCIAL WORK (OUTPATIENT)
Dept: TRANSPLANT | Facility: CLINIC | Age: 64
End: 2024-07-26
Payer: MEDICAID

## 2024-07-26 DIAGNOSIS — Z95.811 LVAD (LEFT VENTRICULAR ASSIST DEVICE) PRESENT: Primary | ICD-10-CM

## 2024-07-26 NOTE — PLAN OF CARE
[facility]              Heart Transplant Clinic  [address] [place] [address]              [phone] after hours                    HOME  HEALTH ORDERS        Admit to Home Health     Diagnosis:   Problem List       Patient Active Problem List   Diagnosis    Ventricular tachycardia    CAD (coronary artery disease)    Chronic combined systolic and diastolic heart failure    Type 2 diabetes mellitus without complication, without long-term current use of insulin    PAF (paroxysmal atrial fibrillation)    Ventricular fibrillation    Acute on chronic combined systolic and diastolic CHF, NYHA class 4    Defibrillator discharge    Palliative care encounter    Advance care planning    LVAD (left ventricular assist device) present    Abnormal CXR    Acute encephalopathy    Congestive heart failure    Depressed mood    Moderate protein-calorie malnutrition    Dysphonia    Unilateral complete vocal fold paralysis    Oliguria    Shortness of breath    Adjustment disorder with depressed mood    Deep tissue injury    Blood blister    Lymphadenopathy    At high risk for skin breakdown    Parotid mass    Impaired mobility    Anemia due to chronic kidney disease, on chronic dialysis    At risk for amiodarone toxicity with long term use    Anemia in stage 4 chronic kidney disease    End stage heart failure    IVÁN (acute kidney injury)    Pneumonia due to COVID-19 virus    Headache    Hypertension    Weakness    Hyponatremia    Bacteremia    Pneumonia of right lung due to infectious organism            Patient is homebound due to:   Diet: Low Sodium  Acitivities: As Tolerated     Nursing:   SN to complete comprehensive assessment including routine vital signs. Instruct on disease process and s/s of complications to report to MD. Review/verify medication list sent home with the patient at time of discharge  and instruct patient/caregiver as needed. Frequency may be adjusted  depending on start of care date.     Notify MD if SBP > 160 or < 90; DBP > 90 or < 50; HR > 120 or < 50; Temp > 101; Weight gain >3lbs in 1 day or 5lbs in 1 week.     LABS:  SN to perform labs: Order the following labs to be drawn on Mondays:   CBC  CMP   INR  BNP  Mag       CONSULTS:    Physical Therapy to evaluate and treat. Evaluate for home safety and equipment needs; Establish/upgrade home exercise program. Perform / instruct on therapeutic exercises, gait training, transfer training, and Range of Motion.    Aide to provide assistance with personal care, ADLs, and vital signs     Send initial Home Health orders to HTS attending physician on call.  Send follow up questions to [phone] or fax:                                                                                                Heart Failure:      [phone]

## 2024-07-26 NOTE — PROGRESS NOTES
Admitted 7/23-7/25. Hospital course: Radha Abbott is a 64 y.o. male s/p HM3 lvad from 12.1.23 was admitted for evaluation of volume overload. During his hospital stay Mr. Abbott was evaluated by nephrology and underwent iHD. Plan to resume his outpatient schedule MWF. His inr today is 2.5. Plan to continue warfarin 2.5mg orally daily on discharge.

## 2024-07-26 NOTE — PROGRESS NOTES
Pt wife confirmed correct d/c dosing per calendar. INR orders sent to [facility] for STAT INR 7/29/24.

## 2024-07-29 ENCOUNTER — LAB VISIT (OUTPATIENT)
Dept: LAB | Facility: HOSPITAL | Age: 64
End: 2024-07-29
Attending: INTERNAL MEDICINE
Payer: MEDICAID

## 2024-07-29 ENCOUNTER — ANTI-COAG VISIT (OUTPATIENT)
Dept: CARDIOLOGY | Facility: CLINIC | Age: 64
End: 2024-07-29
Payer: MEDICAID

## 2024-07-29 ENCOUNTER — TELEPHONE (OUTPATIENT)
Dept: TRANSPLANT | Facility: CLINIC | Age: 64
End: 2024-07-29
Payer: MEDICAID

## 2024-07-29 DIAGNOSIS — N18.6 MALIGNANT HYPERTENSION WITH HEART FAILURE AND END-STAGE RENAL DIS: Primary | ICD-10-CM

## 2024-07-29 DIAGNOSIS — I13.2 MALIGNANT HYPERTENSION WITH HEART FAILURE AND END-STAGE RENAL DIS: Primary | ICD-10-CM

## 2024-07-29 DIAGNOSIS — Z95.811 LVAD (LEFT VENTRICULAR ASSIST DEVICE) PRESENT: Primary | ICD-10-CM

## 2024-07-29 DIAGNOSIS — Z79.01 LONG TERM (CURRENT) USE OF ANTICOAGULANTS: ICD-10-CM

## 2024-07-29 LAB
INR PPP: 2.4 (ref 0.8–1.2)
PROTHROMBIN TIME: 26.3 SEC (ref 9–12.5)

## 2024-07-29 PROCEDURE — 93793 ANTICOAG MGMT PT WARFARIN: CPT | Mod: ,,,

## 2024-07-29 PROCEDURE — 36415 COLL VENOUS BLD VENIPUNCTURE: CPT | Performed by: INTERNAL MEDICINE

## 2024-07-29 PROCEDURE — 85610 PROTHROMBIN TIME: CPT | Performed by: INTERNAL MEDICINE

## 2024-07-29 NOTE — TELEPHONE ENCOUNTER
0644: Patients wife called to report low blood pressure. This morning 76/60, yesterday stayed in the 90/60-70s. She has not given him Hydralazine since Saturday d/t to low BP. Patient admits to feeling weak. I asked that she continue holding the Hydralazine and I will talk with a provider.     0830: Spoke with Dr. Mcintyre and relayed to Mrs. Stoddard. Give patient one dose of Midodrine 5mg now; patient has HD this afternoon. Continue holding Hydralazine and monitor BP at home and HD.     1015:  RNBryson called. He wanted to review the medications. I instructed to take Hydralazine out of medication routine and have Midodrine 5mg PRN for BP <80/60. I asked Mr. Abbott (on phone as well) to take his Midodrine with him to HD.

## 2024-07-29 NOTE — PROGRESS NOTES
[facility]    2024 1:13 PM    Assessment/Plan:    Patient presents today with therapeutic INR.    Assessment of patient findings and chart review: no significant findings     Recommendation for patient's warfarin regimen: Continue current maintenance dose    Recommend repeat INR Thursday  _________________________________________________________________    Radha Abbott (64 y.o.) is followed by the [facility].    Anticoagulation Summary  As of 2024      INR goal:  2.0-3.0   TTR:  49.5% (3.5 mo)   INR used for dosin.4 (2024)   Warfarin maintenance plan:  2.5 mg (2.5 mg x 1) every day   Weekly warfarin total:  17.5 mg   Plan last modified:  Jesenia Jensen, PharmD (2024)   Next INR check:  2024   Target end date:      Indications    LVAD (left ventricular assist device) present [Z95.811]                 Anticoagulation Episode Summary       INR check location:      Preferred lab:      Send INR reminders to:  [facility] COUMADIN LVAD    Comments:  [facility]  ( [phone], fax [phone])  // [facility]          Anticoagulation Care Providers       Provider Role Specialty Phone number    Sajan Hurley MD [facility] Cardiology [phone]

## 2024-07-30 ENCOUNTER — TELEPHONE (OUTPATIENT)
Dept: TRANSPLANT | Facility: CLINIC | Age: 64
End: 2024-07-30
Payer: MEDICAID

## 2024-07-30 NOTE — TELEPHONE ENCOUNTER
Patient's wife paged overnight reporting patient's BP of 85/65 and asking if she should give midodrine. Explained that the blood pressure is actually good, his MAP calculates out to 72. Wife asked when is low too low. Explained that its not a set number as its a calculation and it depends on what the calculation is. As of right now, the patient should not take midodrine nor hydralazine without speaking with team first. Wife verbalized understanding. Plan was to reassess patient's BP in the AM.

## 2024-07-31 ENCOUNTER — DOCUMENT SCAN (OUTPATIENT)
Dept: HOME HEALTH SERVICES | Facility: HOSPITAL | Age: 64
End: 2024-07-31
Payer: MEDICAID

## 2024-08-01 ENCOUNTER — HOSPITAL ENCOUNTER (EMERGENCY)
Facility: HOSPITAL | Age: 64
Discharge: ANOTHER HOSPITAL | End: 2024-08-01
Attending: EMERGENCY MEDICINE
Payer: MEDICAID

## 2024-08-01 ENCOUNTER — ANTI-COAG VISIT (OUTPATIENT)
Dept: CARDIOLOGY | Facility: CLINIC | Age: 64
End: 2024-08-01
Payer: MEDICAID

## 2024-08-01 ENCOUNTER — TELEPHONE (OUTPATIENT)
Dept: TRANSPLANT | Facility: CLINIC | Age: 64
End: 2024-08-01
Payer: MEDICAID

## 2024-08-01 ENCOUNTER — HOSPITAL ENCOUNTER (INPATIENT)
Facility: HOSPITAL | Age: 64
LOS: 6 days | Discharge: HOME | DRG: 640 | End: 2024-08-07
Attending: INTERNAL MEDICINE | Admitting: INTERNAL MEDICINE
Payer: MEDICAID

## 2024-08-01 VITALS
HEIGHT: 73 IN | HEART RATE: 91 BPM | WEIGHT: 175.06 LBS | RESPIRATION RATE: 20 BRPM | SYSTOLIC BLOOD PRESSURE: 97 MMHG | DIASTOLIC BLOOD PRESSURE: 58 MMHG | TEMPERATURE: 98 F | BODY MASS INDEX: 23.2 KG/M2 | OXYGEN SATURATION: 98 %

## 2024-08-01 DIAGNOSIS — E87.6 HYPOKALEMIA: ICD-10-CM

## 2024-08-01 DIAGNOSIS — A49.8 CLOSTRIDIUM DIFFICILE INFECTION: ICD-10-CM

## 2024-08-01 DIAGNOSIS — D63.8 ANEMIA OF CHRONIC DISEASE: ICD-10-CM

## 2024-08-01 DIAGNOSIS — Z95.811 LVAD (LEFT VENTRICULAR ASSIST DEVICE) PRESENT: ICD-10-CM

## 2024-08-01 DIAGNOSIS — R53.1 GENERALIZED WEAKNESS: Primary | ICD-10-CM

## 2024-08-01 DIAGNOSIS — Z99.2 CKD (CHRONIC KIDNEY DISEASE) STAGE V REQUIRING CHRONIC DIALYSIS: ICD-10-CM

## 2024-08-01 DIAGNOSIS — E87.6 HYPOKALEMIA DUE TO EXCESSIVE GASTROINTESTINAL LOSS OF POTASSIUM: Primary | ICD-10-CM

## 2024-08-01 DIAGNOSIS — R53.83 FATIGUE: ICD-10-CM

## 2024-08-01 DIAGNOSIS — N18.6 CKD (CHRONIC KIDNEY DISEASE) STAGE V REQUIRING CHRONIC DIALYSIS: ICD-10-CM

## 2024-08-01 DIAGNOSIS — Z95.811 LVAD (LEFT VENTRICULAR ASSIST DEVICE) PRESENT: Primary | ICD-10-CM

## 2024-08-01 LAB
ALBUMIN SERPL BCP-MCNC: 2.1 G/DL (ref 3.5–5.2)
ALP SERPL-CCNC: 179 U/L (ref 55–135)
ALT SERPL W/O P-5'-P-CCNC: 17 U/L (ref 10–44)
ANION GAP SERPL CALC-SCNC: 15 MMOL/L (ref 8–16)
APTT PPP: 47.6 SEC (ref 21–32)
AST SERPL-CCNC: 22 U/L (ref 10–40)
BASOPHILS # BLD AUTO: 0.09 K/UL (ref 0–0.2)
BASOPHILS NFR BLD: 0.6 % (ref 0–1.9)
BILIRUB SERPL-MCNC: 0.4 MG/DL (ref 0.1–1)
BUN SERPL-MCNC: 22 MG/DL (ref 8–23)
CALCIUM SERPL-MCNC: 8.1 MG/DL (ref 8.7–10.5)
CHLORIDE SERPL-SCNC: 101 MMOL/L (ref 95–110)
CO2 SERPL-SCNC: 17 MMOL/L (ref 23–29)
CREAT SERPL-MCNC: 4.9 MG/DL (ref 0.5–1.4)
DIFFERENTIAL METHOD BLD: ABNORMAL
EOSINOPHIL # BLD AUTO: 0.1 K/UL (ref 0–0.5)
EOSINOPHIL NFR BLD: 0.8 % (ref 0–8)
ERYTHROCYTE [DISTWIDTH] IN BLOOD BY AUTOMATED COUNT: 17.2 % (ref 11.5–14.5)
EST. GFR  (NO RACE VARIABLE): 13 ML/MIN/1.73 M^2
GLUCOSE SERPL-MCNC: 219 MG/DL (ref 70–110)
HCT VFR BLD AUTO: 26.8 % (ref 40–54)
HGB BLD-MCNC: 8.7 G/DL (ref 14–18)
IMM GRANULOCYTES # BLD AUTO: 0.22 K/UL (ref 0–0.04)
IMM GRANULOCYTES NFR BLD AUTO: 1.4 % (ref 0–0.5)
INR PPP: 2.3 (ref 0.8–1.2)
LYMPHOCYTES # BLD AUTO: 0.6 K/UL (ref 1–4.8)
LYMPHOCYTES NFR BLD: 3.6 % (ref 18–48)
MAGNESIUM SERPL-MCNC: 1.7 MG/DL (ref 1.6–2.6)
MCH RBC QN AUTO: 28.2 PG (ref 27–31)
MCHC RBC AUTO-ENTMCNC: 32.5 G/DL (ref 32–36)
MCV RBC AUTO: 87 FL (ref 82–98)
MONOCYTES # BLD AUTO: 0.9 K/UL (ref 0.3–1)
MONOCYTES NFR BLD: 5.8 % (ref 4–15)
NEUTROPHILS # BLD AUTO: 13.7 K/UL (ref 1.8–7.7)
NEUTROPHILS NFR BLD: 87.8 % (ref 38–73)
NRBC BLD-RTO: 0 /100 WBC
OHS QRS DURATION: 156 MS
OHS QTC CALCULATION: 497 MS
PLATELET # BLD AUTO: 373 K/UL (ref 150–450)
PMV BLD AUTO: 9.2 FL (ref 9.2–12.9)
POTASSIUM SERPL-SCNC: 2.7 MMOL/L (ref 3.5–5.1)
PROT SERPL-MCNC: 7 G/DL (ref 6–8.4)
PROTHROMBIN TIME: 24 SEC (ref 9–12.5)
RBC # BLD AUTO: 3.08 M/UL (ref 4.6–6.2)
SODIUM SERPL-SCNC: 133 MMOL/L (ref 136–145)
TROPONIN I SERPL DL<=0.01 NG/ML-MCNC: 0.08 NG/ML (ref 0–0.03)
WBC # BLD AUTO: 15.57 K/UL (ref 3.9–12.7)

## 2024-08-01 PROCEDURE — 80053 COMPREHEN METABOLIC PANEL: CPT | Performed by: NURSE PRACTITIONER

## 2024-08-01 PROCEDURE — 84100 ASSAY OF PHOSPHORUS: CPT | Performed by: STUDENT IN AN ORGANIZED HEALTH CARE EDUCATION/TRAINING PROGRAM

## 2024-08-01 PROCEDURE — 85025 COMPLETE CBC W/AUTO DIFF WBC: CPT | Performed by: NURSE PRACTITIONER

## 2024-08-01 PROCEDURE — 85025 COMPLETE CBC W/AUTO DIFF WBC: CPT | Mod: 91 | Performed by: STUDENT IN AN ORGANIZED HEALTH CARE EDUCATION/TRAINING PROGRAM

## 2024-08-01 PROCEDURE — 96374 THER/PROPH/DIAG INJ IV PUSH: CPT

## 2024-08-01 PROCEDURE — 25000003 PHARM REV CODE 250: Performed by: EMERGENCY MEDICINE

## 2024-08-01 PROCEDURE — 84145 PROCALCITONIN (PCT): CPT | Performed by: STUDENT IN AN ORGANIZED HEALTH CARE EDUCATION/TRAINING PROGRAM

## 2024-08-01 PROCEDURE — 83880 ASSAY OF NATRIURETIC PEPTIDE: CPT | Performed by: STUDENT IN AN ORGANIZED HEALTH CARE EDUCATION/TRAINING PROGRAM

## 2024-08-01 PROCEDURE — 20600001 HC STEP DOWN PRIVATE ROOM

## 2024-08-01 PROCEDURE — 84484 ASSAY OF TROPONIN QUANT: CPT | Performed by: NURSE PRACTITIONER

## 2024-08-01 PROCEDURE — 85652 RBC SED RATE AUTOMATED: CPT | Performed by: STUDENT IN AN ORGANIZED HEALTH CARE EDUCATION/TRAINING PROGRAM

## 2024-08-01 PROCEDURE — 27000248 HC VAD-ADDITIONAL DAY

## 2024-08-01 PROCEDURE — 85730 THROMBOPLASTIN TIME PARTIAL: CPT | Performed by: EMERGENCY MEDICINE

## 2024-08-01 PROCEDURE — 63600175 PHARM REV CODE 636 W HCPCS: Performed by: EMERGENCY MEDICINE

## 2024-08-01 PROCEDURE — 83605 ASSAY OF LACTIC ACID: CPT | Performed by: STUDENT IN AN ORGANIZED HEALTH CARE EDUCATION/TRAINING PROGRAM

## 2024-08-01 PROCEDURE — 93005 ELECTROCARDIOGRAM TRACING: CPT

## 2024-08-01 PROCEDURE — 80048 BASIC METABOLIC PNL TOTAL CA: CPT | Mod: XB | Performed by: STUDENT IN AN ORGANIZED HEALTH CARE EDUCATION/TRAINING PROGRAM

## 2024-08-01 PROCEDURE — 83735 ASSAY OF MAGNESIUM: CPT | Performed by: NURSE PRACTITIONER

## 2024-08-01 PROCEDURE — 99285 EMERGENCY DEPT VISIT HI MDM: CPT | Mod: 25

## 2024-08-01 PROCEDURE — 85730 THROMBOPLASTIN TIME PARTIAL: CPT | Mod: 91 | Performed by: STUDENT IN AN ORGANIZED HEALTH CARE EDUCATION/TRAINING PROGRAM

## 2024-08-01 PROCEDURE — 83615 LACTATE (LD) (LDH) ENZYME: CPT | Performed by: STUDENT IN AN ORGANIZED HEALTH CARE EDUCATION/TRAINING PROGRAM

## 2024-08-01 PROCEDURE — 85610 PROTHROMBIN TIME: CPT | Performed by: EMERGENCY MEDICINE

## 2024-08-01 PROCEDURE — 86140 C-REACTIVE PROTEIN: CPT | Performed by: STUDENT IN AN ORGANIZED HEALTH CARE EDUCATION/TRAINING PROGRAM

## 2024-08-01 PROCEDURE — 93010 ELECTROCARDIOGRAM REPORT: CPT | Mod: ,,, | Performed by: INTERNAL MEDICINE

## 2024-08-01 PROCEDURE — 80076 HEPATIC FUNCTION PANEL: CPT | Performed by: STUDENT IN AN ORGANIZED HEALTH CARE EDUCATION/TRAINING PROGRAM

## 2024-08-01 PROCEDURE — 85610 PROTHROMBIN TIME: CPT | Mod: 91 | Performed by: STUDENT IN AN ORGANIZED HEALTH CARE EDUCATION/TRAINING PROGRAM

## 2024-08-01 PROCEDURE — 83735 ASSAY OF MAGNESIUM: CPT | Mod: 91 | Performed by: STUDENT IN AN ORGANIZED HEALTH CARE EDUCATION/TRAINING PROGRAM

## 2024-08-01 RX ORDER — CHOLECALCIFEROL (VITAMIN D3) 25 MCG
1000 TABLET ORAL DAILY
Status: DISCONTINUED | OUTPATIENT
Start: 2024-08-02 | End: 2024-08-07 | Stop reason: HOSPADM

## 2024-08-01 RX ORDER — ONDANSETRON HYDROCHLORIDE 2 MG/ML
4 INJECTION, SOLUTION INTRAVENOUS
Status: COMPLETED | OUTPATIENT
Start: 2024-08-01 | End: 2024-08-01

## 2024-08-01 RX ORDER — POTASSIUM CHLORIDE 7.45 MG/ML
10 INJECTION INTRAVENOUS
Status: DISCONTINUED | OUTPATIENT
Start: 2024-08-01 | End: 2024-08-01

## 2024-08-01 RX ORDER — AMIODARONE HYDROCHLORIDE 200 MG/1
400 TABLET ORAL DAILY
Status: DISCONTINUED | OUTPATIENT
Start: 2024-08-02 | End: 2024-08-07 | Stop reason: HOSPADM

## 2024-08-01 RX ORDER — MIDODRINE HYDROCHLORIDE 5 MG/1
5 TABLET ORAL EVERY 8 HOURS
Status: ON HOLD | COMMUNITY
End: 2024-08-07 | Stop reason: HOSPADM

## 2024-08-01 RX ORDER — ATORVASTATIN CALCIUM 20 MG/1
40 TABLET, FILM COATED ORAL NIGHTLY
Status: DISCONTINUED | OUTPATIENT
Start: 2024-08-02 | End: 2024-08-07 | Stop reason: HOSPADM

## 2024-08-01 RX ORDER — PANTOPRAZOLE SODIUM 40 MG/1
40 TABLET, DELAYED RELEASE ORAL DAILY
Status: DISCONTINUED | OUTPATIENT
Start: 2024-08-02 | End: 2024-08-07 | Stop reason: HOSPADM

## 2024-08-01 RX ORDER — POTASSIUM CHLORIDE 20 MEQ/1
60 TABLET, EXTENDED RELEASE ORAL ONCE
Status: COMPLETED | OUTPATIENT
Start: 2024-08-01 | End: 2024-08-01

## 2024-08-01 RX ORDER — HYDRALAZINE HYDROCHLORIDE 25 MG/1
25 TABLET, FILM COATED ORAL EVERY 8 HOURS
Status: DISCONTINUED | OUTPATIENT
Start: 2024-08-01 | End: 2024-08-02

## 2024-08-01 RX ORDER — OMEGA-3-ACID ETHYL ESTERS 1 G/1
2 CAPSULE, LIQUID FILLED ORAL 2 TIMES DAILY
Status: DISCONTINUED | OUTPATIENT
Start: 2024-08-02 | End: 2024-08-07 | Stop reason: HOSPADM

## 2024-08-01 RX ORDER — AMOXICILLIN 250 MG
2 CAPSULE ORAL DAILY PRN
Status: DISCONTINUED | OUTPATIENT
Start: 2024-08-02 | End: 2024-08-07 | Stop reason: HOSPADM

## 2024-08-01 RX ORDER — POTASSIUM CHLORIDE 20 MEQ/1
40 TABLET, EXTENDED RELEASE ORAL
Status: DISCONTINUED | OUTPATIENT
Start: 2024-08-01 | End: 2024-08-01

## 2024-08-01 RX ORDER — AMOXICILLIN 500 MG/1
500 CAPSULE ORAL DAILY
Status: DISCONTINUED | OUTPATIENT
Start: 2024-08-02 | End: 2024-08-02

## 2024-08-01 RX ORDER — VENLAFAXINE 37.5 MG/1
37.5 TABLET ORAL DAILY
Status: DISCONTINUED | OUTPATIENT
Start: 2024-08-02 | End: 2024-08-07 | Stop reason: HOSPADM

## 2024-08-01 RX ORDER — WARFARIN 2.5 MG/1
2.5 TABLET ORAL DAILY
Status: DISCONTINUED | OUTPATIENT
Start: 2024-08-02 | End: 2024-08-07 | Stop reason: HOSPADM

## 2024-08-01 RX ADMIN — ONDANSETRON 4 MG: 2 INJECTION INTRAMUSCULAR; INTRAVENOUS at 11:08

## 2024-08-01 RX ADMIN — POTASSIUM CHLORIDE 60 MEQ: 1500 TABLET, EXTENDED RELEASE ORAL at 12:08

## 2024-08-01 NOTE — TELEPHONE ENCOUNTER
Received PC from  nurse, Brenda. Patient is c/o watery diarrhea x 1 week and increased weakness. Patient has tried Imodium without relief. I advised that patient needs to report to his local ER for evaluation and a stool sample as he could have CDiff.     Dr. Mcintyre and on-call VC, Oscar Duncan, made aware.

## 2024-08-01 NOTE — TELEPHONE ENCOUNTER
Patient's wife called to inform that they are at the ER waiting on a bed. She also informs that she called ID this morning to talk about his antibiotics since he is having the diarrhea but has not gotten a call back.     I sent a secure chat to Dr. Dwayne Rice in ID as patient is a DT VAD and on Amoxicillin suppression for bacteremia.

## 2024-08-01 NOTE — FIRST PROVIDER EVALUATION
"Medical screening examination initiated.  I have conducted a focused provider triage encounter, findings are as follows:    Brief history of present illness:  pt is lvad pt, reports fatigue, and diarrhea, instructed to come to be evaluated     Vitals:    08/01/24 0936   BP: (S)   Comment: MARIAM- LVAD patient   Pulse: 82   Temp: 98.6 °F (37 °C)   TempSrc: Oral   SpO2: (S)   Comment: MARIAM, LVAD   Weight: (S)   Comment: Need bed weight.   Height: 6' 1" (1.854 m)       Pertinent physical exam:  nad    Brief workup plan:  labs, imaging, EKG, further eval    Preliminary workup initiated; this workup will be continued and followed by the physician or advanced practice provider that is assigned to the patient when roomed.  "

## 2024-08-01 NOTE — ED NOTES
Pt resting in ER stretcher, aaox4, rr e/u, NAD noted. Pt remains on cardiac monitor with vss noted. Bed low and locked, call light in reach, side rails up x2. Wife at the bedside.  Pt verbalized understanding of status and POC; denies further needs. Will continue to monitor.

## 2024-08-01 NOTE — ED PROVIDER NOTES
SCRIBE #1 NOTE: I, Aracelis Appiah, am scribing for, and in the presence of, Osorio Sepulveda MD. I have scribed the entire note.       History     Chief Complaint   Patient presents with    Weakness     Patient presents to ED with c/o weakness and diarrhea that started about 2 weeks ago. Patient with LVAD.      Review of patient's allergies indicates:  No Known Allergies      History of Present Illness     HPI    8/1/2024, 11:23 AM  History obtained from the patient and spouse      History of Present Illness: Radha Abbott is a 64 y.o. male patient with a PMHx of DM Type II, MI, CAD, and CHF who presents to the Emergency Department for evaluation of  generalized increasing weakness which onset gradually about 2 weeks ago.  He is a dialysis patient and does have an LVAD.  He has been having increasing weakness was where he can barely walk.  His wife is having to bathe him now and help him changes closed which she used to be able to do for himself.  Pt mentions that last night he experienced vomiting after completing dialysis. He reports he visited the ED last week for edema and was admitted and then discharged last Thursday. Pt is visited by HomeHealth nurse who does blood work and takes vitals. He also sees a PT who walks him around. Per wife, pt feels the need to be placed into a rehab facility. He states that he has done rehab before, but his strength decreases upon returning home. Pt ambulates only short distances. He also reports to making little urine. Symptoms are constant and moderate in severity. No mitigating or exacerbating factors reported. Associated sxs include diarrhea and vomiting. Patient denies any fever, congestion, SOB, CP, and all other sxs at this time. No prior Tx. Pt has an LVAD that was placed in December of 2023. No further complaints or concerns at this time.       Arrival mode: Personal vehicle      PCP: Vasu Kong MD        Past Medical History:  Past Medical History:    Diagnosis Date    Aspiration pneumonia of both lungs 06/17/2024    CAD (coronary artery disease)     CHF (congestive heart failure)     Delirium 06/16/2024    Diabetes mellitus     HFrEF (heart failure with reduced ejection fraction)     Hypoglycemia 06/12/2024    ICD (implantable cardioverter-defibrillator) in place     MI, old     Type 2 diabetes mellitus without complication, without long-term current use of insulin 10/07/2023       Past Surgical History:  Past Surgical History:   Procedure Laterality Date    ANGIOPLASTY-VENOUS ARTERY Right 12/1/2023    Procedure: ANGIOPLASTY-VENOUS ARTERY, RIGHT FEMORAL;  Surgeon: Yuri Washington MD;  Location: [facility] OR [address] [place] [address];  Service: Cardiovascular;  Laterality: Right;    AORTIC VALVULOPLASTY N/A 12/1/2023    Procedure: REPAIR, AORTIC VALVE;  Surgeon: Yuri Washington MD;  [facility];  Service: Cardiovascular;  Laterality: N/A;    CARDIAC SURGERY      CLOSURE N/A 12/1/2023    Procedure: CLOSURE, TEMPORARY;  Surgeon: Yuri Washington MD;  [facility];  Service: Cardiovascular;  Laterality: N/A;    DRAINAGE OF PLEURAL EFFUSION  12/4/2023    Procedure: DRAINAGE, PLEURAL EFFUSION;  Surgeon: Yuri Washington MD;  [facility];  Service: Cardiovascular;;    INSERTION OF GRAFT TO PERICARDIUM  12/4/2023    Procedure: INSERTION, GRAFT, PERICARDIUM;  Surgeon: Yuri Washington MD;  [facility];  Service: Cardiovascular;;    INSERTION OF INTRA-AORTIC BALLOON ASSIST DEVICE Right 11/21/2023    Procedure: INSERTION, INTRA-AORTIC BALLOON PUMP;  Surgeon: Finn Cohn MD;  Location: [facility] CATH LAB;  Service: Cardiology;  Laterality: Right;    LEFT VENTRICULAR ASSIST DEVICE Left 12/1/2023    Procedure: INSERTION-LEFT VENTRICULAR ASSIST DEVICE;  Surgeon: Yuri Washington MD;  Location: [facility] OR [address] [place] [address];  Service: Cardiovascular;  Laterality: Left;  REDO STERNOTOMY - REDO SAW NEEDED FOR CASE    LYSIS OF ADHESIONS  12/1/2023    Procedure:  LYSIS, ADHESIONS;  Surgeon: Yrui Washington MD;  [facility];  Service: Cardiovascular;;    PLACEMENT OF SWAN ROLANDO CATHETER WITH IMAGING GUIDANCE  11/20/2023    Procedure: INSERTION, CATHETER, SWAN-ROLANDO, WITH IMAGING GUIDANCE;  Surgeon: Sajan Hurley MD;  Location: [facility] CATH LAB;  Service: Cardiology;;    REMOVAL OF TUNNELED CENTRAL VENOUS CATHETER (CVC) N/A 3/1/2024    Procedure: REMOVAL, CATHETER, CENTRAL VENOUS, TUNNELED;  Surgeon: Seble Aguilar MD;  Location: [facility] CATH LAB;  Service: Interventional Nephrology;  Laterality: N/A;    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 12/1/2023    Procedure: REPAIR, ANEURYSM, ARTERY, FEMORAL;  Surgeon: Yuri Washington MD;  [facility];  Service: Cardiovascular;  Laterality: Right;  Right Femoral Artery Repair    RIGHT HEART CATHETERIZATION Right 10/10/2023    Procedure: INSERTION, CATHETER, RIGHT HEART;  Surgeon: Bin Gandhi MD;  Location: [facility] CATH LAB;  Service: Cardiology;  Laterality: Right;    RIGHT HEART CATHETERIZATION Right 10/13/2023    Procedure: INSERTION, CATHETER, RIGHT HEART;  Surgeon: Walter Mcintyre MD;  Location: [facility] CATH LAB;  Service: Cardiology;  Laterality: Right;    RIGHT HEART CATHETERIZATION  11/13/2023    RIGHT HEART CATHETERIZATION Right 11/13/2023    Procedure: INSERTION, CATHETER, RIGHT HEART;  Surgeon: Juventino Bermudez Jr., MD;  Location: [facility] CATH LAB;  Service: Cardiology;  Laterality: Right;    RIGHT HEART CATHETERIZATION Right 11/20/2023    Procedure: INSERTION, CATHETER, RIGHT HEART;  Surgeon: Sajan Hurley MD;  Location: [facility] CATH LAB;  Service: Cardiology;  Laterality: Right;    RIGHT HEART CATHETERIZATION Right 1/22/2024    Procedure: INSERTION, CATHETER, RIGHT HEART;  Surgeon: Brayan Ocampo MD;  Location: [facility] CATH LAB;  Service: Cardiology;  Laterality: Right;    STERNAL WOUND CLOSURE N/A 12/4/2023    Procedure: CLOSURE, WOUND, STERNUM;  Surgeon: Yuri Washington MD;   Location: [address];  Service: Cardiovascular;  Laterality: N/A;    STERNOTOMY N/A 12/1/2023    Procedure: STERNOTOMY, REDO;  Surgeon: Yuri Washington MD;  Location: [address];  Service: Cardiovascular;  Laterality: N/A;    VALVULOPLASTY, MITRAL VALVE N/A 12/1/2023    Procedure: VALVULOPLASTY, MITRAL VALVE;  Surgeon: Yuri Washington MD;  Location: [address];  Service: Cardiovascular;  Laterality: N/A;         Family History:  No family history on file.    Social History:  Social History     Tobacco Use    Smoking status: Former     Current packs/day: 0.50     Types: Cigarettes    Smokeless tobacco: Not on file   Substance and Sexual Activity    Alcohol use: Yes     Comment: rarely    Drug use: No    Sexual activity: Not Currently     Partners: Female        Review of Systems     Review of Systems   Constitutional:  Negative for fever.   HENT:  Negative for congestion and sore throat.    Respiratory:  Negative for shortness of breath.    Cardiovascular:  Negative for chest pain.   Gastrointestinal:  Positive for diarrhea and vomiting. Negative for abdominal pain and nausea.   Genitourinary:  Negative for dysuria.   Musculoskeletal:  Negative for back pain.   Skin:  Negative for rash.   Neurological:  Positive for weakness. Negative for headaches.   Hematological:  Does not bruise/bleed easily.   All other systems reviewed and are negative.       Physical Exam     Initial Vitals   BP Pulse Resp Temp SpO2   08/01/24 1449 08/01/24 0936 08/01/24 1051 08/01/24 0936 08/01/24 1449   95/62 82 16 98.6 °F (37 °C) 98 %      MAP       --                 Physical Exam  Nursing Notes and Vital Signs Reviewed.  Constitutional: Patient is in no acute distress.  Weak. Looks chronically ill.  Head: Atraumatic. Normocephalic.  Eyes: EOM intact. Conjunctivae are not pale. No scleral icterus.  ENT: Mucous membranes are dry. Oropharynx is clear.    Neck: Supple. Full ROM. No lymphadenopathy.  Cardiovascular:  Patient  with LVAD with the machine like sounds  Pulmonary/Chest: No respiratory distress. Clear to auscultation bilaterally. No wheezing or rales.  Abdominal: Soft and non-distended.  There is no tenderness.  No rebound, guarding, or rigidity. Good bowel sounds.  Genitourinary: No CVA tenderness  Musculoskeletal: Moves all extremities. No obvious deformities. No edema. No calf tenderness.  Skin: Warm and dry.  Neurological:  Alert, awake, and appropriate.  Normal speech.  No acute focal neurological deficits are appreciated.  Psychiatric: Flat affect. Good eye contact. Appropriate in content.     ED Course   Critical Care    Date/Time: 8/1/2024 2:06 PM    Performed by: Osorio Sepulveda MD  Authorized by: Osorio Sepulveda MD  Direct patient critical care time: 15 minutes  Additional history critical care time: 12 minutes  Ordering / reviewing critical care time: 8 minutes  Documentation critical care time: 5 minutes  Consulting other physicians critical care time: 8 minutes  Total critical care time (exclusive of procedural time) : 48 minutes  Critical care time was exclusive of teaching time and separately billable procedures and treating other patients.  Critical care was necessary to treat or prevent imminent or life-threatening deterioration of the following conditions: hyperkalemia.  Critical care was time spent personally by me on the following activities: blood draw for specimens, discussions with consultants, development of treatment plan with patient or surrogate, interpretation of cardiac output measurements, evaluation of patient's response to treatment, examination of patient, obtaining history from patient or surrogate, ordering and performing treatments and interventions, ordering and review of laboratory studies, ordering and review of radiographic studies, pulse oximetry, re-evaluation of patient's condition and review of old charts.        ED Vital Signs:  Vitals:    08/01/24 0936 08/01/24 1051  "08/01/24 1449   BP:   95/62   Pulse: 82  79   Resp:  16 17   Temp: 98.6 °F (37 °C)  98.4 °F (36.9 °C)   TempSrc: Oral  Oral   SpO2:   98%   Weight:  79.4 kg (175 lb 0.7 oz)    Height: 6' 1" (1.854 m)         Abnormal Lab Results:  Labs Reviewed   CBC W/ AUTO DIFFERENTIAL - Abnormal       Result Value    WBC 15.57 (*)     RBC 3.08 (*)     Hemoglobin 8.7 (*)     Hematocrit 26.8 (*)     MCV 87      MCH 28.2      MCHC 32.5      RDW 17.2 (*)     Platelets 373      MPV 9.2      Immature Granulocytes 1.4 (*)     Gran # (ANC) 13.7 (*)     Immature Grans (Abs) 0.22 (*)     Lymph # 0.6 (*)     Mono # 0.9      Eos # 0.1      Baso # 0.09      nRBC 0      Gran % 87.8 (*)     Lymph % 3.6 (*)     Mono % 5.8      Eosinophil % 0.8      Basophil % 0.6      Differential Method Automated     COMPREHENSIVE METABOLIC PANEL - Abnormal    Sodium 133 (*)     Potassium 2.7 (*)     Chloride 101      CO2 17 (*)     Glucose 219 (*)     BUN 22      Creatinine 4.9 (*)     Calcium 8.1 (*)     Total Protein 7.0      Albumin 2.1 (*)     Total Bilirubin 0.4      Alkaline Phosphatase 179 (*)     AST 22      ALT 17      eGFR 13 (*)     Anion Gap 15      Narrative:     K critical result(s) called and verbal readback obtained from ANAND KIMBROUGH RN by CP5 08/01/2024 12:33   TROPONIN I - Abnormal    Troponin I 0.076 (*)    APTT - Abnormal    aPTT 47.6 (*)    PROTIME-INR - Abnormal    Prothrombin Time 24.0 (*)     INR 2.3 (*)    MAGNESIUM   MAGNESIUM    Magnesium 1.7     URINALYSIS, REFLEX TO URINE CULTURE        All Lab Results:  Results for orders placed or performed during the hospital encounter of 08/01/24   CBC auto differential   Result Value Ref Range    WBC 15.57 (H) 3.90 - 12.70 K/uL    RBC 3.08 (L) 4.60 - 6.20 M/uL    Hemoglobin 8.7 (L) 14.0 - 18.0 g/dL    Hematocrit 26.8 (L) 40.0 - 54.0 %    MCV 87 82 - 98 fL    MCH 28.2 27.0 - 31.0 pg    MCHC 32.5 32.0 - 36.0 g/dL    RDW 17.2 (H) 11.5 - 14.5 %    Platelets 373 150 - 450 K/uL    MPV 9.2 9.2 - " 12.9 fL    Immature Granulocytes 1.4 (H) 0.0 - 0.5 %    Gran # (ANC) 13.7 (H) 1.8 - 7.7 K/uL    Immature Grans (Abs) 0.22 (H) 0.00 - 0.04 K/uL    Lymph # 0.6 (L) 1.0 - 4.8 K/uL    Mono # 0.9 0.3 - 1.0 K/uL    Eos # 0.1 0.0 - 0.5 K/uL    Baso # 0.09 0.00 - 0.20 K/uL    nRBC 0 0 /100 WBC    Gran % 87.8 (H) 38.0 - 73.0 %    Lymph % 3.6 (L) 18.0 - 48.0 %    Mono % 5.8 4.0 - 15.0 %    Eosinophil % 0.8 0.0 - 8.0 %    Basophil % 0.6 0.0 - 1.9 %    Differential Method Automated    Comprehensive metabolic panel   Result Value Ref Range    Sodium 133 (L) 136 - 145 mmol/L    Potassium 2.7 (LL) 3.5 - 5.1 mmol/L    Chloride 101 95 - 110 mmol/L    CO2 17 (L) 23 - 29 mmol/L    Glucose 219 (H) 70 - 110 mg/dL    BUN 22 8 - 23 mg/dL    Creatinine 4.9 (H) 0.5 - 1.4 mg/dL    Calcium 8.1 (L) 8.7 - 10.5 mg/dL    Total Protein 7.0 6.0 - 8.4 g/dL    Albumin 2.1 (L) 3.5 - 5.2 g/dL    Total Bilirubin 0.4 0.1 - 1.0 mg/dL    Alkaline Phosphatase 179 (H) 55 - 135 U/L    AST 22 10 - 40 U/L    ALT 17 10 - 44 U/L    eGFR 13 (A) >60 mL/min/1.73 m^2    Anion Gap 15 8 - 16 mmol/L   Troponin I   Result Value Ref Range    Troponin I 0.076 (H) 0.000 - 0.026 ng/mL   APTT   Result Value Ref Range    aPTT 47.6 (H) 21.0 - 32.0 sec   Protime-INR   Result Value Ref Range    Prothrombin Time 24.0 (H) 9.0 - 12.5 sec    INR 2.3 (H) 0.8 - 1.2   Magnesium   Result Value Ref Range    Magnesium 1.7 1.6 - 2.6 mg/dL   EKG 12-lead   Result Value Ref Range    QRS Duration 156 ms    [facility] QTC Calculation 497 ms         Imaging Results:  Imaging Results              X-Ray Chest 1 View (Final result)  Result time 08/01/24 10:04:31      Final result by Gino Adair MD (08/01/24 10:04:31)                   Impression:      1.  Interval improvement without complete resolution of left pleural effusion, adjacent atelectasis/consolidation and vascular congestion.    2.  Negative for new pulmonary opacities are other acute process.    3.  State findings as noted  above.      Electronically signed by: Gino Adair MD  Date:    08/01/2024  Time:    10:04               Narrative:    EXAMINATION:  XR CHEST 1 VIEW    CLINICAL HISTORY:  Other fatigue    COMPARISON:  July 23, 2024, as well as studies dating back to February 07/20/2018    FINDINGS:  Interval decrease without complete resolution of left pleural effusion and adjacent atelectasis/consolidation.  Interval resolution of vascular congestion.  The lungs are free of new pulmonary opacities.  The cardiac silhouette size is enlarged, with stable LVAD.  The trachea is midline and the mediastinal width is normal. Negative for right effusion or pneumothorax.  Pulmonary vasculature is normal. Negative for osseous abnormalities. Single lead left subclavian ICD device again seen.  Stable right jugular vascular catheter.  Stable median sternotomy wires.  Tortuous aorta with calcifications of the aortic knob.                                       The EKG was ordered, reviewed, and independently interpreted by the ED provider.  Interpretation time: 10:41  Rate: 86 BPM  Rhythm: Wide QRS rhythm.  Interpretation: Right superior axis deviation. Nonspecific intraventricular block. Minimal voltage criteria for LVH, may be normal variant ([place] product). No STEMI.             The Emergency Provider reviewed the vital signs and test results, which are outlined above.     ED Discussion       12:48 PM: Consult with Dr. Mcintyre (Cardiology) at [facility] concerning pt. There are no cardiologist services for LVAD, which the patient requires, offered at [facility] at this time. Dr. Mcintyre expresses understanding and will accept transfer.  Patient with hypokalemia.  Cardiology only making giving him 60 of potassium orally and they will repeat his potassium adjust as needed.  They do not recommend any fluids at this time.  Accepting Facility: Cardiac Stepdown Unit at [facility]  Accepting Physician: Dr. Mcintyre     12:48 PM: Re-evaluated  pt. Informed pt and family that there are no cardiology services available at this time. I have discussed test results, shared treatment plan, and the need for transfer with patient and family at bedside. All historical, clinical, radiographic, and laboratory findings were reviewed with the patient/family in detail. Patient will be transferred by [facility] services with care required en route. Patient understands that there could be unforeseen motor vehicle accidents or loss of vital signs that could result in potential death or permanent disability. Pt and family express understanding at this time and agree with all information. All questions answered. Pt and family have no further questions or concerns at this time. Pt is ready for transfer.         Medical Decision Making  Amount and/or Complexity of Data Reviewed  Labs: ordered. Decision-making details documented in ED Course.  Radiology: ordered. Decision-making details documented in ED Course.  ECG/medicine tests: ordered and independent interpretation performed. Decision-making details documented in ED Course.  Discussion of management or test interpretation with external provider(s): Differential diagnosis: Dehydration, electrolyte abnormality, arrhythmia, infection, STEMI, NSTEMI, UTI      Risk  Prescription drug management.                ED Medication(s):  Medications   ondansetron injection 4 mg (4 mg Intravenous Given 8/1/24 1138)   potassium chloride SA CR tablet 60 mEq (60 mEq Oral Given 8/1/24 1255)       New Prescriptions    No medications on file               Scribe Attestation:   Scribe #1: I performed the above scribed service and the documentation accurately describes the services I performed. I attest to the accuracy of the note.     Attending:   Physician Attestation Statement for Scribe #1: I, Osorio Sepulveda MD, personally performed the services described in this documentation, as scribed by Aracelis Appiah, in my presence, and it is both  accurate and complete.           Clinical Impression       ICD-10-CM ICD-9-CM   1. Generalized weakness  R53.1 780.79   2. Fatigue  R53.83 780.79   3. Hypokalemia  E87.6 276.8       Disposition:   Disposition: Transferred  Condition: Stable         Osorio Sepulveda MD  08/01/24 8955

## 2024-08-01 NOTE — ED NOTES
Received call from [facility], patient room # 309 [facility]. Number for report [phone].

## 2024-08-02 ENCOUNTER — TELEPHONE (OUTPATIENT)
Dept: INFECTIOUS DISEASES | Facility: CLINIC | Age: 64
End: 2024-08-02
Payer: MEDICAID

## 2024-08-02 PROBLEM — R19.7 DIARRHEA: Status: ACTIVE | Noted: 2024-08-02

## 2024-08-02 PROBLEM — A49.8 CLOSTRIDIUM DIFFICILE INFECTION: Status: ACTIVE | Noted: 2024-08-02

## 2024-08-02 PROBLEM — I50.42 CHRONIC COMBINED SYSTOLIC AND DIASTOLIC CHF, NYHA CLASS 4: Chronic | Status: ACTIVE | Noted: 2024-06-12

## 2024-08-02 PROBLEM — R19.7 DIARRHEA: Status: RESOLVED | Noted: 2024-08-02 | Resolved: 2024-08-02

## 2024-08-02 LAB
ALBUMIN SERPL BCP-MCNC: 2 G/DL (ref 3.5–5.2)
ALBUMIN SERPL BCP-MCNC: 2.2 G/DL (ref 3.5–5.2)
ALP SERPL-CCNC: 185 U/L (ref 55–135)
ALP SERPL-CCNC: 193 U/L (ref 55–135)
ALT SERPL W/O P-5'-P-CCNC: 18 U/L (ref 10–44)
ALT SERPL W/O P-5'-P-CCNC: 18 U/L (ref 10–44)
ANION GAP SERPL CALC-SCNC: 10 MMOL/L (ref 8–16)
ANION GAP SERPL CALC-SCNC: 11 MMOL/L (ref 8–16)
ANION GAP SERPL CALC-SCNC: 9 MMOL/L (ref 8–16)
ANISOCYTOSIS BLD QL SMEAR: SLIGHT
APTT PPP: 47.2 SEC (ref 21–32)
APTT PPP: 48.2 SEC (ref 21–32)
AST SERPL-CCNC: 26 U/L (ref 10–40)
AST SERPL-CCNC: 27 U/L (ref 10–40)
BASOPHILS # BLD AUTO: 0.05 K/UL (ref 0–0.2)
BASOPHILS # BLD AUTO: 0.07 K/UL (ref 0–0.2)
BASOPHILS NFR BLD: 0.3 % (ref 0–1.9)
BASOPHILS NFR BLD: 0.4 % (ref 0–1.9)
BILIRUB DIRECT SERPL-MCNC: 0.2 MG/DL (ref 0.1–0.3)
BILIRUB DIRECT SERPL-MCNC: 0.2 MG/DL (ref 0.1–0.3)
BILIRUB SERPL-MCNC: 0.4 MG/DL (ref 0.1–1)
BILIRUB SERPL-MCNC: 0.4 MG/DL (ref 0.1–1)
BNP SERPL-MCNC: 670 PG/ML (ref 0–99)
BNP SERPL-MCNC: 747 PG/ML (ref 0–99)
BUN SERPL-MCNC: 27 MG/DL (ref 8–23)
BUN SERPL-MCNC: 29 MG/DL (ref 8–23)
BUN SERPL-MCNC: 32 MG/DL (ref 8–23)
BURR CELLS BLD QL SMEAR: ABNORMAL
C DIFF GDH STL QL: POSITIVE
C DIFF TOX A+B STL QL IA: POSITIVE
CALCIUM SERPL-MCNC: 7.6 MG/DL (ref 8.7–10.5)
CALCIUM SERPL-MCNC: 8.2 MG/DL (ref 8.7–10.5)
CALCIUM SERPL-MCNC: 8.4 MG/DL (ref 8.7–10.5)
CHLORIDE SERPL-SCNC: 101 MMOL/L (ref 95–110)
CHLORIDE SERPL-SCNC: 102 MMOL/L (ref 95–110)
CHLORIDE SERPL-SCNC: 102 MMOL/L (ref 95–110)
CO2 SERPL-SCNC: 19 MMOL/L (ref 23–29)
CO2 SERPL-SCNC: 20 MMOL/L (ref 23–29)
CO2 SERPL-SCNC: 20 MMOL/L (ref 23–29)
CREAT SERPL-MCNC: 5.4 MG/DL (ref 0.5–1.4)
CREAT SERPL-MCNC: 5.5 MG/DL (ref 0.5–1.4)
CREAT SERPL-MCNC: 5.6 MG/DL (ref 0.5–1.4)
CRP SERPL-MCNC: 202.6 MG/L (ref 0–8.2)
CRP SERPL-MCNC: 216.2 MG/L (ref 0–8.2)
DIFFERENTIAL METHOD BLD: ABNORMAL
DIFFERENTIAL METHOD BLD: ABNORMAL
DOHLE BOD BLD QL SMEAR: PRESENT
EOSINOPHIL # BLD AUTO: 0.1 K/UL (ref 0–0.5)
EOSINOPHIL # BLD AUTO: 0.1 K/UL (ref 0–0.5)
EOSINOPHIL NFR BLD: 0.5 % (ref 0–8)
EOSINOPHIL NFR BLD: 0.7 % (ref 0–8)
ERYTHROCYTE [DISTWIDTH] IN BLOOD BY AUTOMATED COUNT: 17.7 % (ref 11.5–14.5)
ERYTHROCYTE [DISTWIDTH] IN BLOOD BY AUTOMATED COUNT: 17.7 % (ref 11.5–14.5)
ERYTHROCYTE [SEDIMENTATION RATE] IN BLOOD BY PHOTOMETRIC METHOD: 61 MM/HR (ref 0–23)
EST. GFR  (NO RACE VARIABLE): 10.8 ML/MIN/1.73 M^2
EST. GFR  (NO RACE VARIABLE): 11 ML/MIN/1.73 M^2
EST. GFR  (NO RACE VARIABLE): 11.2 ML/MIN/1.73 M^2
GLUCOSE SERPL-MCNC: 190 MG/DL (ref 70–110)
GLUCOSE SERPL-MCNC: 242 MG/DL (ref 70–110)
GLUCOSE SERPL-MCNC: 242 MG/DL (ref 70–110)
HCT VFR BLD AUTO: 27.5 % (ref 40–54)
HCT VFR BLD AUTO: 30.1 % (ref 40–54)
HGB BLD-MCNC: 9 G/DL (ref 14–18)
HGB BLD-MCNC: 9.6 G/DL (ref 14–18)
HYPOCHROMIA BLD QL SMEAR: ABNORMAL
IMM GRANULOCYTES # BLD AUTO: 0.22 K/UL (ref 0–0.04)
IMM GRANULOCYTES # BLD AUTO: 0.26 K/UL (ref 0–0.04)
IMM GRANULOCYTES NFR BLD AUTO: 1.3 % (ref 0–0.5)
IMM GRANULOCYTES NFR BLD AUTO: 1.5 % (ref 0–0.5)
INR PPP: 2.2 (ref 0.8–1.2)
INR PPP: 2.2 (ref 0.8–1.2)
LACTATE SERPL-SCNC: 1.4 MMOL/L (ref 0.5–2.2)
LDH SERPL L TO P-CCNC: 238 U/L (ref 110–260)
LDH SERPL L TO P-CCNC: 260 U/L (ref 110–260)
LYMPHOCYTES # BLD AUTO: 0.4 K/UL (ref 1–4.8)
LYMPHOCYTES # BLD AUTO: 0.5 K/UL (ref 1–4.8)
LYMPHOCYTES NFR BLD: 2.4 % (ref 18–48)
LYMPHOCYTES NFR BLD: 2.7 % (ref 18–48)
MAGNESIUM SERPL-MCNC: 1.8 MG/DL (ref 1.6–2.6)
MAGNESIUM SERPL-MCNC: 2.4 MG/DL (ref 1.6–2.6)
MCH RBC QN AUTO: 28.1 PG (ref 27–31)
MCH RBC QN AUTO: 28.5 PG (ref 27–31)
MCHC RBC AUTO-ENTMCNC: 31.9 G/DL (ref 32–36)
MCHC RBC AUTO-ENTMCNC: 32.7 G/DL (ref 32–36)
MCV RBC AUTO: 87 FL (ref 82–98)
MCV RBC AUTO: 88 FL (ref 82–98)
MONOCYTES # BLD AUTO: 0.7 K/UL (ref 0.3–1)
MONOCYTES # BLD AUTO: 1 K/UL (ref 0.3–1)
MONOCYTES NFR BLD: 3.9 % (ref 4–15)
MONOCYTES NFR BLD: 5.5 % (ref 4–15)
NEUTROPHILS # BLD AUTO: 15.5 K/UL (ref 1.8–7.7)
NEUTROPHILS # BLD AUTO: 16.3 K/UL (ref 1.8–7.7)
NEUTROPHILS NFR BLD: 89.5 % (ref 38–73)
NEUTROPHILS NFR BLD: 91.3 % (ref 38–73)
NRBC BLD-RTO: 0 /100 WBC
NRBC BLD-RTO: 0 /100 WBC
OVALOCYTES BLD QL SMEAR: ABNORMAL
PHOSPHATE SERPL-MCNC: 3.1 MG/DL (ref 2.7–4.5)
PHOSPHATE SERPL-MCNC: 3.2 MG/DL (ref 2.7–4.5)
PLATELET # BLD AUTO: 388 K/UL (ref 150–450)
PLATELET # BLD AUTO: 447 K/UL (ref 150–450)
PLATELET BLD QL SMEAR: ABNORMAL
PLATELET BLD QL SMEAR: ABNORMAL
PMV BLD AUTO: 9.4 FL (ref 9.2–12.9)
PMV BLD AUTO: 9.5 FL (ref 9.2–12.9)
POIKILOCYTOSIS BLD QL SMEAR: SLIGHT
POLYCHROMASIA BLD QL SMEAR: ABNORMAL
POTASSIUM SERPL-SCNC: 3 MMOL/L (ref 3.5–5.1)
POTASSIUM SERPL-SCNC: 3.1 MMOL/L (ref 3.5–5.1)
POTASSIUM SERPL-SCNC: 3.3 MMOL/L (ref 3.5–5.1)
PREALB SERPL-MCNC: 12 MG/DL (ref 20–43)
PROCALCITONIN SERPL IA-MCNC: 1.89 NG/ML
PROT SERPL-MCNC: 6.6 G/DL (ref 6–8.4)
PROT SERPL-MCNC: 7.4 G/DL (ref 6–8.4)
PROTHROMBIN TIME: 22.5 SEC (ref 9–12.5)
PROTHROMBIN TIME: 23 SEC (ref 9–12.5)
RBC # BLD AUTO: 3.16 M/UL (ref 4.6–6.2)
RBC # BLD AUTO: 3.42 M/UL (ref 4.6–6.2)
SODIUM SERPL-SCNC: 130 MMOL/L (ref 136–145)
SODIUM SERPL-SCNC: 132 MMOL/L (ref 136–145)
SODIUM SERPL-SCNC: 132 MMOL/L (ref 136–145)
SPHEROCYTES BLD QL SMEAR: ABNORMAL
TOXIC GRANULES BLD QL SMEAR: PRESENT
WBC # BLD AUTO: 17.33 K/UL (ref 3.9–12.7)
WBC # BLD AUTO: 17.83 K/UL (ref 3.9–12.7)

## 2024-08-02 PROCEDURE — 80048 BASIC METABOLIC PNL TOTAL CA: CPT | Performed by: STUDENT IN AN ORGANIZED HEALTH CARE EDUCATION/TRAINING PROGRAM

## 2024-08-02 PROCEDURE — 83880 ASSAY OF NATRIURETIC PEPTIDE: CPT | Performed by: STUDENT IN AN ORGANIZED HEALTH CARE EDUCATION/TRAINING PROGRAM

## 2024-08-02 PROCEDURE — 5A1D70Z PERFORMANCE OF URINARY FILTRATION, INTERMITTENT, LESS THAN 6 HOURS PER DAY: ICD-10-PCS | Performed by: INTERNAL MEDICINE

## 2024-08-02 PROCEDURE — 80100014 HC HEMODIALYSIS 1:1

## 2024-08-02 PROCEDURE — 27000207 HC ISOLATION

## 2024-08-02 PROCEDURE — 87449 NOS EACH ORGANISM AG IA: CPT | Mod: 91 | Performed by: STUDENT IN AN ORGANIZED HEALTH CARE EDUCATION/TRAINING PROGRAM

## 2024-08-02 PROCEDURE — 87449 NOS EACH ORGANISM AG IA: CPT | Performed by: STUDENT IN AN ORGANIZED HEALTH CARE EDUCATION/TRAINING PROGRAM

## 2024-08-02 PROCEDURE — 87324 CLOSTRIDIUM AG IA: CPT | Performed by: STUDENT IN AN ORGANIZED HEALTH CARE EDUCATION/TRAINING PROGRAM

## 2024-08-02 PROCEDURE — 80048 BASIC METABOLIC PNL TOTAL CA: CPT | Mod: 91 | Performed by: REGISTERED NURSE

## 2024-08-02 PROCEDURE — 85610 PROTHROMBIN TIME: CPT | Performed by: STUDENT IN AN ORGANIZED HEALTH CARE EDUCATION/TRAINING PROGRAM

## 2024-08-02 PROCEDURE — 99223 1ST HOSP IP/OBS HIGH 75: CPT | Mod: ,,, | Performed by: STUDENT IN AN ORGANIZED HEALTH CARE EDUCATION/TRAINING PROGRAM

## 2024-08-02 PROCEDURE — 25000003 PHARM REV CODE 250: Performed by: INTERNAL MEDICINE

## 2024-08-02 PROCEDURE — 25000003 PHARM REV CODE 250: Performed by: REGISTERED NURSE

## 2024-08-02 PROCEDURE — 84134 ASSAY OF PREALBUMIN: CPT | Performed by: STUDENT IN AN ORGANIZED HEALTH CARE EDUCATION/TRAINING PROGRAM

## 2024-08-02 PROCEDURE — 93750 INTERROGATION VAD IN PERSON: CPT | Mod: ,,, | Performed by: INTERNAL MEDICINE

## 2024-08-02 PROCEDURE — 20600001 HC STEP DOWN PRIVATE ROOM

## 2024-08-02 PROCEDURE — 83735 ASSAY OF MAGNESIUM: CPT | Performed by: STUDENT IN AN ORGANIZED HEALTH CARE EDUCATION/TRAINING PROGRAM

## 2024-08-02 PROCEDURE — 99222 1ST HOSP IP/OBS MODERATE 55: CPT | Mod: ,,, | Performed by: NURSE PRACTITIONER

## 2024-08-02 PROCEDURE — 80076 HEPATIC FUNCTION PANEL: CPT | Performed by: STUDENT IN AN ORGANIZED HEALTH CARE EDUCATION/TRAINING PROGRAM

## 2024-08-02 PROCEDURE — 84100 ASSAY OF PHOSPHORUS: CPT | Performed by: STUDENT IN AN ORGANIZED HEALTH CARE EDUCATION/TRAINING PROGRAM

## 2024-08-02 PROCEDURE — 86140 C-REACTIVE PROTEIN: CPT | Performed by: STUDENT IN AN ORGANIZED HEALTH CARE EDUCATION/TRAINING PROGRAM

## 2024-08-02 PROCEDURE — 87427 SHIGA-LIKE TOXIN AG IA: CPT | Performed by: STUDENT IN AN ORGANIZED HEALTH CARE EDUCATION/TRAINING PROGRAM

## 2024-08-02 PROCEDURE — 27000248 HC VAD-ADDITIONAL DAY

## 2024-08-02 PROCEDURE — 25000003 PHARM REV CODE 250: Performed by: STUDENT IN AN ORGANIZED HEALTH CARE EDUCATION/TRAINING PROGRAM

## 2024-08-02 PROCEDURE — 36415 COLL VENOUS BLD VENIPUNCTURE: CPT | Performed by: REGISTERED NURSE

## 2024-08-02 PROCEDURE — 99233 SBSQ HOSP IP/OBS HIGH 50: CPT | Mod: ,,, | Performed by: REGISTERED NURSE

## 2024-08-02 PROCEDURE — 87046 STOOL CULTR AEROBIC BACT EA: CPT | Performed by: STUDENT IN AN ORGANIZED HEALTH CARE EDUCATION/TRAINING PROGRAM

## 2024-08-02 PROCEDURE — 63600175 PHARM REV CODE 636 W HCPCS

## 2024-08-02 PROCEDURE — 85730 THROMBOPLASTIN TIME PARTIAL: CPT | Performed by: STUDENT IN AN ORGANIZED HEALTH CARE EDUCATION/TRAINING PROGRAM

## 2024-08-02 PROCEDURE — 87040 BLOOD CULTURE FOR BACTERIA: CPT | Performed by: STUDENT IN AN ORGANIZED HEALTH CARE EDUCATION/TRAINING PROGRAM

## 2024-08-02 PROCEDURE — 87045 FECES CULTURE AEROBIC BACT: CPT | Performed by: STUDENT IN AN ORGANIZED HEALTH CARE EDUCATION/TRAINING PROGRAM

## 2024-08-02 PROCEDURE — 36415 COLL VENOUS BLD VENIPUNCTURE: CPT | Performed by: STUDENT IN AN ORGANIZED HEALTH CARE EDUCATION/TRAINING PROGRAM

## 2024-08-02 PROCEDURE — 63600175 PHARM REV CODE 636 W HCPCS: Performed by: STUDENT IN AN ORGANIZED HEALTH CARE EDUCATION/TRAINING PROGRAM

## 2024-08-02 PROCEDURE — 85025 COMPLETE CBC W/AUTO DIFF WBC: CPT | Performed by: STUDENT IN AN ORGANIZED HEALTH CARE EDUCATION/TRAINING PROGRAM

## 2024-08-02 PROCEDURE — 83615 LACTATE (LD) (LDH) ENZYME: CPT | Performed by: STUDENT IN AN ORGANIZED HEALTH CARE EDUCATION/TRAINING PROGRAM

## 2024-08-02 RX ORDER — POTASSIUM CHLORIDE 750 MG/1
30 CAPSULE, EXTENDED RELEASE ORAL ONCE
Status: COMPLETED | OUTPATIENT
Start: 2024-08-02 | End: 2024-08-02

## 2024-08-02 RX ORDER — POTASSIUM CHLORIDE 750 MG/1
10 CAPSULE, EXTENDED RELEASE ORAL ONCE
Status: COMPLETED | OUTPATIENT
Start: 2024-08-02 | End: 2024-08-02

## 2024-08-02 RX ORDER — POTASSIUM CHLORIDE 20 MEQ/1
40 TABLET, EXTENDED RELEASE ORAL ONCE
Status: DISCONTINUED | OUTPATIENT
Start: 2024-08-02 | End: 2024-08-02

## 2024-08-02 RX ORDER — BISMUTH SUBSALICYLATE 525 MG/30ML
30 LIQUID ORAL EVERY 4 HOURS PRN
Status: DISCONTINUED | OUTPATIENT
Start: 2024-08-02 | End: 2024-08-02

## 2024-08-02 RX ORDER — SODIUM CHLORIDE 9 MG/ML
INJECTION, SOLUTION INTRAVENOUS ONCE
Status: CANCELLED | OUTPATIENT
Start: 2024-08-02 | End: 2024-08-02

## 2024-08-02 RX ORDER — MAGNESIUM SULFATE HEPTAHYDRATE 40 MG/ML
2 INJECTION, SOLUTION INTRAVENOUS ONCE
Status: COMPLETED | OUTPATIENT
Start: 2024-08-02 | End: 2024-08-02

## 2024-08-02 RX ORDER — POTASSIUM CHLORIDE 750 MG/1
40 CAPSULE, EXTENDED RELEASE ORAL ONCE
Status: COMPLETED | OUTPATIENT
Start: 2024-08-02 | End: 2024-08-02

## 2024-08-02 RX ADMIN — POTASSIUM CHLORIDE 40 MEQ: 750 CAPSULE, EXTENDED RELEASE ORAL at 06:08

## 2024-08-02 RX ADMIN — VANCOMYCIN HYDROCHLORIDE 125 MG: KIT at 06:08

## 2024-08-02 RX ADMIN — WARFARIN SODIUM 2.5 MG: 2.5 TABLET ORAL at 06:08

## 2024-08-02 RX ADMIN — OMEGA-3-ACID ETHYL ESTERS 2 G: 1 CAPSULE, LIQUID FILLED ORAL at 08:08

## 2024-08-02 RX ADMIN — Medication 1 CAPSULE: at 08:08

## 2024-08-02 RX ADMIN — POTASSIUM CHLORIDE 10 MEQ: 750 CAPSULE, EXTENDED RELEASE ORAL at 01:08

## 2024-08-02 RX ADMIN — TRAZODONE HYDROCHLORIDE 25 MG: 50 TABLET ORAL at 02:08

## 2024-08-02 RX ADMIN — PIPERACILLIN SODIUM AND TAZOBACTAM SODIUM 4.5 G: 4; .5 INJECTION, POWDER, FOR SOLUTION INTRAVENOUS at 03:08

## 2024-08-02 RX ADMIN — CHOLECALCIFEROL TAB 25 MCG (1000 UNIT) 1000 UNITS: 25 TAB at 08:08

## 2024-08-02 RX ADMIN — ATORVASTATIN CALCIUM 40 MG: 20 TABLET, FILM COATED ORAL at 08:08

## 2024-08-02 RX ADMIN — TRAZODONE HYDROCHLORIDE 25 MG: 50 TABLET ORAL at 09:08

## 2024-08-02 RX ADMIN — PANTOPRAZOLE SODIUM 40 MG: 40 TABLET, DELAYED RELEASE ORAL at 08:08

## 2024-08-02 RX ADMIN — POTASSIUM CHLORIDE 30 MEQ: 750 CAPSULE, EXTENDED RELEASE ORAL at 07:08

## 2024-08-02 RX ADMIN — VENLAFAXINE 37.5 MG: 37.5 TABLET ORAL at 08:08

## 2024-08-02 RX ADMIN — ERYTHROPOIETIN 3720 UNITS: 4000 INJECTION, SOLUTION INTRAVENOUS; SUBCUTANEOUS at 03:08

## 2024-08-02 RX ADMIN — AMIODARONE HYDROCHLORIDE 400 MG: 200 TABLET ORAL at 08:08

## 2024-08-02 RX ADMIN — MAGNESIUM SULFATE HEPTAHYDRATE 2 G: 40 INJECTION, SOLUTION INTRAVENOUS at 01:08

## 2024-08-02 NOTE — SUBJECTIVE & OBJECTIVE
Past Medical History:   Diagnosis Date    Aspiration pneumonia of both lungs 06/17/2024    CAD (coronary artery disease)     CHF (congestive heart failure)     Delirium 06/16/2024    Diabetes mellitus     HFrEF (heart failure with reduced ejection fraction)     Hypoglycemia 06/12/2024    ICD (implantable cardioverter-defibrillator) in place     MI, old     Type 2 diabetes mellitus without complication, without long-term current use of insulin 10/07/2023       Past Surgical History:   Procedure Laterality Date    ANGIOPLASTY-VENOUS ARTERY Right 12/1/2023    Procedure: ANGIOPLASTY-VENOUS ARTERY, RIGHT FEMORAL;  Surgeon: Yuri Washington MD;  [facility];  Service: Cardiovascular;  Laterality: Right;    AORTIC VALVULOPLASTY N/A 12/1/2023    Procedure: REPAIR, AORTIC VALVE;  Surgeon: Yuri Washington MD;  [facility];  Service: Cardiovascular;  Laterality: N/A;    CARDIAC SURGERY      CLOSURE N/A 12/1/2023    Procedure: CLOSURE, TEMPORARY;  Surgeon: Yuri Washington MD;  [facility];  Service: Cardiovascular;  Laterality: N/A;    DRAINAGE OF PLEURAL EFFUSION  12/4/2023    Procedure: DRAINAGE, PLEURAL EFFUSION;  Surgeon: Yuri Washington MD;  [facility];  Service: Cardiovascular;;    INSERTION OF GRAFT TO PERICARDIUM  12/4/2023    Procedure: INSERTION, GRAFT, PERICARDIUM;  Surgeon: Yuri Washington MD;  [facility];  Service: Cardiovascular;;    INSERTION OF INTRA-AORTIC BALLOON ASSIST DEVICE Right 11/21/2023    Procedure: INSERTION, INTRA-AORTIC BALLOON PUMP;  Surgeon: Finn Cohn MD;  Location: [facility] CATH LAB;  Service: Cardiology;  Laterality: Right;    LEFT VENTRICULAR ASSIST DEVICE Left 12/1/2023    Procedure: INSERTION-LEFT VENTRICULAR ASSIST DEVICE;  Surgeon: Yuri Washington MD;  Location: [facility] OR [address] [place] [address];  Service: Cardiovascular;  Laterality: Left;  REDO STERNOTOMY - REDO SAW NEEDED FOR CASE    LYSIS OF ADHESIONS  12/1/2023    Procedure:  LYSIS, ADHESIONS;  Surgeon: Yuri Washington MD;  [facility];  Service: Cardiovascular;;    PLACEMENT OF SWAN ROLANDO CATHETER WITH IMAGING GUIDANCE  11/20/2023    Procedure: INSERTION, CATHETER, SWAN-ROLANDO, WITH IMAGING GUIDANCE;  Surgeon: Sajan Hurley MD;  Location: [facility] CATH LAB;  Service: Cardiology;;    REMOVAL OF TUNNELED CENTRAL VENOUS CATHETER (CVC) N/A 3/1/2024    Procedure: REMOVAL, CATHETER, CENTRAL VENOUS, TUNNELED;  Surgeon: Seble Aguilar MD;  Location: [facility] CATH LAB;  Service: Interventional Nephrology;  Laterality: N/A;    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 12/1/2023    Procedure: REPAIR, ANEURYSM, ARTERY, FEMORAL;  Surgeon: Yuri Washington MD;  [facility];  Service: Cardiovascular;  Laterality: Right;  Right Femoral Artery Repair    RIGHT HEART CATHETERIZATION Right 10/10/2023    Procedure: INSERTION, CATHETER, RIGHT HEART;  Surgeon: Bin Gandhi MD;  Location: [facility] CATH LAB;  Service: Cardiology;  Laterality: Right;    RIGHT HEART CATHETERIZATION Right 10/13/2023    Procedure: INSERTION, CATHETER, RIGHT HEART;  Surgeon: Walter Mcintyre MD;  Location: [facility] CATH LAB;  Service: Cardiology;  Laterality: Right;    RIGHT HEART CATHETERIZATION  11/13/2023    RIGHT HEART CATHETERIZATION Right 11/13/2023    Procedure: INSERTION, CATHETER, RIGHT HEART;  Surgeon: Juventino Bermudez Jr., MD;  Location: [facility] CATH LAB;  Service: Cardiology;  Laterality: Right;    RIGHT HEART CATHETERIZATION Right 11/20/2023    Procedure: INSERTION, CATHETER, RIGHT HEART;  Surgeon: Sajan Hurley MD;  Location: [facility] CATH LAB;  Service: Cardiology;  Laterality: Right;    RIGHT HEART CATHETERIZATION Right 1/22/2024    Procedure: INSERTION, CATHETER, RIGHT HEART;  Surgeon: Brayan Ocampo MD;  Location: [facility] CATH LAB;  Service: Cardiology;  Laterality: Right;    STERNAL WOUND CLOSURE N/A 12/4/2023    Procedure: CLOSURE, WOUND, STERNUM;  Surgeon: Yuri Washington MD;   Location: [address];  Service: Cardiovascular;  Laterality: N/A;    STERNOTOMY N/A 12/1/2023    Procedure: STERNOTOMY, REDO;  Surgeon: Yuri Washington MD;  [facility];  Service: Cardiovascular;  Laterality: N/A;    VALVULOPLASTY, MITRAL VALVE N/A 12/1/2023    Procedure: VALVULOPLASTY, MITRAL VALVE;  Surgeon: Yuri Washington MD;  Location: [facility] OR [address] [place] [address];  Service: Cardiovascular;  Laterality: N/A;       Review of patient's allergies indicates:  No Known Allergies    No current facility-administered medications for this encounter.     No current outpatient medications on file.     Facility-Administered Medications Ordered in Other Encounters   Medication    amiodarone tablet 400 mg    amoxicillin capsule 500 mg    atorvastatin tablet 40 mg    hydrALAZINE tablet 25 mg    magnesium sulfate 2g in water 50mL IVPB (premix)    omega-3 acid ethyl esters capsule 2 g    pantoprazole EC tablet 40 mg    senna-docusate 8.6-50 mg per tablet 2 tablet    trazodone split tablet 25 mg    venlafaxine tablet 37.5 mg    vitamin D 1000 units tablet 1,000 Units    warfarin (COUMADIN) tablet 2.5 mg     Family History    None       Tobacco Use    Smoking status: Former     Current packs/day: 0.50     Types: Cigarettes    Smokeless tobacco: Not on file   Substance and Sexual Activity    Alcohol use: Yes     Comment: rarely    Drug use: No    Sexual activity: Not Currently     Partners: Female     Review of Systems  Objective:     Vital Signs (Most Recent):  Temp: 97.5 °F (36.4 °C) (07/25/24 1524)  Pulse: 75 (07/25/24 1524)  Resp: 18 (07/25/24 1524)  BP: (!) 82/0 (07/25/24 1730)  SpO2: 97 % (07/25/24 1524) Vital Signs (24h Range):        No data found.  Body mass index is 22.19 kg/m².    No intake or output data in the 24 hours ending 08/02/24 0243       Physical Exam   Vitals and nursing note reviewed.   HENT:      Head: Normocephalic.      Nose: Nose normal.   Eyes:      Conjunctiva/sclera: Conjunctivae normal.    Cardiovascular:      Rate and Rhythm: Normal rate. VAD hum   Pulmonary:      Effort: Pulmonary effort is normal.   Abdominal:      General: Abdomen is flat.      Tenderness: There is no abdominal tenderness.   Musculoskeletal:         General: Normal range of motion.   Skin:     General: Skin is warm.   Neurological:      General: No focal deficit present.      Mental Status: He is alert.   Psychiatric:         Mood and Affect: Mood normal.         Behavior: Behavior normal.         Thought Content: Thought content normal.         Judgment: Judgment normal.     Significant Labs:  CBC:  Recent Labs   Lab 08/01/24  1135 08/01/24  2332   WBC 15.57* 17.83*   RBC 3.08* 3.42*   HGB 8.7* 9.6*   HCT 26.8* 30.1*    447   MCV 87 88   MCH 28.2 28.1   MCHC 32.5 31.9*     BNP:  Recent Labs   Lab 08/01/24  2332   *     CMP:  Recent Labs   Lab 08/01/24  1135 08/01/24  2332   * 242*   CALCIUM 8.1* 8.4*   ALBUMIN 2.1* 2.2*   PROT 7.0 7.4   * 132*   K 2.7* 3.1*   CO2 17* 20*    101   BUN 22 27*   CREATININE 4.9* 5.5*   ALKPHOS 179* 193*   ALT 17 18   AST 22 27   BILITOT 0.4 0.4      Coagulation:   Recent Labs   Lab 07/29/24  1100 08/01/24  1250 08/01/24  2332   INR 2.4* 2.3* 2.2*   APTT  --  47.6* 47.2*     LDH:  Recent Labs   Lab 08/01/24  2332        Microbiology:  Microbiology Results (last 7 days)       Procedure Component Value Units Date/Time    Clostridium difficile EIA [[id number]] Collected: 07/25/24 1007    Order Status: Sent Specimen: Stool Updated: 07/25/24 1007            I have reviewed all pertinent labs within the past 24 hours.    Diagnostic Results:  I have reviewed and interpreted all pertinent imaging results/findings within the past 24 hours.

## 2024-08-02 NOTE — TELEPHONE ENCOUNTER
----- Message from Axel Caban MD sent at 8/2/2024  3:55 PM CDT -----  I am trying to move most of my LVAD patients to Dr. Rice as it is an area of expertise for her.  See if Dr. Rice can see him.  If not, I can squeeze him in.    Rene Caban  ----- Message -----  From: Lydia Ma MA  Sent: 8/2/2024   2:34 PM CDT  To: Axel Caban MD    Are you ok to see him in 2 weeks?

## 2024-08-03 LAB
ANION GAP SERPL CALC-SCNC: 10 MMOL/L (ref 8–16)
ANION GAP SERPL CALC-SCNC: 8 MMOL/L (ref 8–16)
ANISOCYTOSIS BLD QL SMEAR: SLIGHT
APTT PPP: 49.4 SEC (ref 21–32)
BASOPHILS # BLD AUTO: 0.06 K/UL (ref 0–0.2)
BASOPHILS NFR BLD: 0.4 % (ref 0–1.9)
BUN SERPL-MCNC: 15 MG/DL (ref 8–23)
BUN SERPL-MCNC: 18 MG/DL (ref 8–23)
CALCIUM SERPL-MCNC: 7.8 MG/DL (ref 8.7–10.5)
CALCIUM SERPL-MCNC: 7.9 MG/DL (ref 8.7–10.5)
CHLORIDE SERPL-SCNC: 102 MMOL/L (ref 95–110)
CHLORIDE SERPL-SCNC: 102 MMOL/L (ref 95–110)
CO2 SERPL-SCNC: 21 MMOL/L (ref 23–29)
CO2 SERPL-SCNC: 24 MMOL/L (ref 23–29)
CREAT SERPL-MCNC: 3.7 MG/DL (ref 0.5–1.4)
CREAT SERPL-MCNC: 4.2 MG/DL (ref 0.5–1.4)
DIFFERENTIAL METHOD BLD: ABNORMAL
DOHLE BOD BLD QL SMEAR: PRESENT
E COLI SXT1 STL QL IA: NEGATIVE
E COLI SXT2 STL QL IA: NEGATIVE
EOSINOPHIL # BLD AUTO: 0.2 K/UL (ref 0–0.5)
EOSINOPHIL NFR BLD: 1.3 % (ref 0–8)
ERYTHROCYTE [DISTWIDTH] IN BLOOD BY AUTOMATED COUNT: 17.7 % (ref 11.5–14.5)
EST. GFR  (NO RACE VARIABLE): 15.2 ML/MIN/1.73 M^2
EST. GFR  (NO RACE VARIABLE): 17.7 ML/MIN/1.73 M^2
GLUCOSE SERPL-MCNC: 151 MG/DL (ref 70–110)
GLUCOSE SERPL-MCNC: 222 MG/DL (ref 70–110)
HCT VFR BLD AUTO: 24.9 % (ref 40–54)
HGB BLD-MCNC: 8 G/DL (ref 14–18)
HYPOCHROMIA BLD QL SMEAR: ABNORMAL
IMM GRANULOCYTES # BLD AUTO: 0.18 K/UL (ref 0–0.04)
IMM GRANULOCYTES NFR BLD AUTO: 1.2 % (ref 0–0.5)
INR PPP: 2.5 (ref 0.8–1.2)
LDH SERPL L TO P-CCNC: 210 U/L (ref 110–260)
LYMPHOCYTES # BLD AUTO: 0.5 K/UL (ref 1–4.8)
LYMPHOCYTES NFR BLD: 2.9 % (ref 18–48)
MAGNESIUM SERPL-MCNC: 1.9 MG/DL (ref 1.6–2.6)
MCH RBC QN AUTO: 27.7 PG (ref 27–31)
MCHC RBC AUTO-ENTMCNC: 32.1 G/DL (ref 32–36)
MCV RBC AUTO: 86 FL (ref 82–98)
MONOCYTES # BLD AUTO: 0.9 K/UL (ref 0.3–1)
MONOCYTES NFR BLD: 5.6 % (ref 4–15)
NEUTROPHILS # BLD AUTO: 13.9 K/UL (ref 1.8–7.7)
NEUTROPHILS NFR BLD: 88.6 % (ref 38–73)
NRBC BLD-RTO: 0 /100 WBC
PHOSPHATE SERPL-MCNC: 1.8 MG/DL (ref 2.7–4.5)
PLATELET # BLD AUTO: 366 K/UL (ref 150–450)
PLATELET BLD QL SMEAR: ABNORMAL
PMV BLD AUTO: 9.7 FL (ref 9.2–12.9)
POTASSIUM SERPL-SCNC: 3 MMOL/L (ref 3.5–5.1)
POTASSIUM SERPL-SCNC: 3.1 MMOL/L (ref 3.5–5.1)
PROTHROMBIN TIME: 25.4 SEC (ref 9–12.5)
RBC # BLD AUTO: 2.89 M/UL (ref 4.6–6.2)
SODIUM SERPL-SCNC: 133 MMOL/L (ref 136–145)
SODIUM SERPL-SCNC: 134 MMOL/L (ref 136–145)
TOXIC GRANULES BLD QL SMEAR: PRESENT
WBC # BLD AUTO: 15.64 K/UL (ref 3.9–12.7)

## 2024-08-03 PROCEDURE — 80048 BASIC METABOLIC PNL TOTAL CA: CPT | Performed by: STUDENT IN AN ORGANIZED HEALTH CARE EDUCATION/TRAINING PROGRAM

## 2024-08-03 PROCEDURE — 99233 SBSQ HOSP IP/OBS HIGH 50: CPT | Mod: ,,, | Performed by: STUDENT IN AN ORGANIZED HEALTH CARE EDUCATION/TRAINING PROGRAM

## 2024-08-03 PROCEDURE — 85610 PROTHROMBIN TIME: CPT | Performed by: STUDENT IN AN ORGANIZED HEALTH CARE EDUCATION/TRAINING PROGRAM

## 2024-08-03 PROCEDURE — 25000003 PHARM REV CODE 250: Performed by: STUDENT IN AN ORGANIZED HEALTH CARE EDUCATION/TRAINING PROGRAM

## 2024-08-03 PROCEDURE — 85730 THROMBOPLASTIN TIME PARTIAL: CPT | Performed by: STUDENT IN AN ORGANIZED HEALTH CARE EDUCATION/TRAINING PROGRAM

## 2024-08-03 PROCEDURE — 85025 COMPLETE CBC W/AUTO DIFF WBC: CPT | Performed by: STUDENT IN AN ORGANIZED HEALTH CARE EDUCATION/TRAINING PROGRAM

## 2024-08-03 PROCEDURE — 84100 ASSAY OF PHOSPHORUS: CPT | Performed by: STUDENT IN AN ORGANIZED HEALTH CARE EDUCATION/TRAINING PROGRAM

## 2024-08-03 PROCEDURE — 25000003 PHARM REV CODE 250: Performed by: REGISTERED NURSE

## 2024-08-03 PROCEDURE — 83735 ASSAY OF MAGNESIUM: CPT | Performed by: STUDENT IN AN ORGANIZED HEALTH CARE EDUCATION/TRAINING PROGRAM

## 2024-08-03 PROCEDURE — 80048 BASIC METABOLIC PNL TOTAL CA: CPT | Mod: 91 | Performed by: REGISTERED NURSE

## 2024-08-03 PROCEDURE — 36415 COLL VENOUS BLD VENIPUNCTURE: CPT | Performed by: STUDENT IN AN ORGANIZED HEALTH CARE EDUCATION/TRAINING PROGRAM

## 2024-08-03 PROCEDURE — 99233 SBSQ HOSP IP/OBS HIGH 50: CPT | Mod: ,,, | Performed by: REGISTERED NURSE

## 2024-08-03 PROCEDURE — 27000207 HC ISOLATION

## 2024-08-03 PROCEDURE — 36415 COLL VENOUS BLD VENIPUNCTURE: CPT | Performed by: REGISTERED NURSE

## 2024-08-03 PROCEDURE — 93750 INTERROGATION VAD IN PERSON: CPT | Mod: ,,, | Performed by: INTERNAL MEDICINE

## 2024-08-03 PROCEDURE — 27000248 HC VAD-ADDITIONAL DAY

## 2024-08-03 PROCEDURE — 83615 LACTATE (LD) (LDH) ENZYME: CPT | Performed by: STUDENT IN AN ORGANIZED HEALTH CARE EDUCATION/TRAINING PROGRAM

## 2024-08-03 PROCEDURE — 20600001 HC STEP DOWN PRIVATE ROOM

## 2024-08-03 RX ORDER — POTASSIUM CHLORIDE 20 MEQ/1
40 TABLET, EXTENDED RELEASE ORAL ONCE
Status: COMPLETED | OUTPATIENT
Start: 2024-08-03 | End: 2024-08-03

## 2024-08-03 RX ORDER — ONDANSETRON HYDROCHLORIDE 2 MG/ML
4 INJECTION, SOLUTION INTRAVENOUS EVERY 6 HOURS PRN
Status: DISCONTINUED | OUTPATIENT
Start: 2024-08-03 | End: 2024-08-07 | Stop reason: HOSPADM

## 2024-08-03 RX ORDER — SODIUM CHLORIDE 9 MG/ML
INJECTION, SOLUTION INTRAVENOUS ONCE
Status: CANCELLED | OUTPATIENT
Start: 2024-08-05

## 2024-08-03 RX ORDER — POTASSIUM CHLORIDE 750 MG/1
40 CAPSULE, EXTENDED RELEASE ORAL ONCE
Status: COMPLETED | OUTPATIENT
Start: 2024-08-03 | End: 2024-08-03

## 2024-08-03 RX ADMIN — OMEGA-3-ACID ETHYL ESTERS 2 G: 1 CAPSULE, LIQUID FILLED ORAL at 08:08

## 2024-08-03 RX ADMIN — WARFARIN SODIUM 2.5 MG: 2.5 TABLET ORAL at 05:08

## 2024-08-03 RX ADMIN — ATORVASTATIN CALCIUM 40 MG: 20 TABLET, FILM COATED ORAL at 08:08

## 2024-08-03 RX ADMIN — VANCOMYCIN HYDROCHLORIDE 125 MG: KIT at 05:08

## 2024-08-03 RX ADMIN — VANCOMYCIN HYDROCHLORIDE 125 MG: KIT at 11:08

## 2024-08-03 RX ADMIN — TRAZODONE HYDROCHLORIDE 25 MG: 50 TABLET ORAL at 11:08

## 2024-08-03 RX ADMIN — POTASSIUM CHLORIDE 40 MEQ: 750 CAPSULE, EXTENDED RELEASE ORAL at 09:08

## 2024-08-03 RX ADMIN — VENLAFAXINE 37.5 MG: 37.5 TABLET ORAL at 08:08

## 2024-08-03 RX ADMIN — VANCOMYCIN HYDROCHLORIDE 125 MG: KIT at 12:08

## 2024-08-03 RX ADMIN — CHOLECALCIFEROL TAB 25 MCG (1000 UNIT) 1000 UNITS: 25 TAB at 08:08

## 2024-08-03 RX ADMIN — PANTOPRAZOLE SODIUM 40 MG: 40 TABLET, DELAYED RELEASE ORAL at 08:08

## 2024-08-03 RX ADMIN — AMIODARONE HYDROCHLORIDE 400 MG: 200 TABLET ORAL at 08:08

## 2024-08-03 RX ADMIN — POTASSIUM CHLORIDE 40 MEQ: 1500 TABLET, EXTENDED RELEASE ORAL at 08:08

## 2024-08-04 LAB
ANION GAP SERPL CALC-SCNC: 11 MMOL/L (ref 8–16)
ANION GAP SERPL CALC-SCNC: 8 MMOL/L (ref 8–16)
APTT PPP: 47.3 SEC (ref 21–32)
BASOPHILS # BLD AUTO: 0.04 K/UL (ref 0–0.2)
BASOPHILS NFR BLD: 0.4 % (ref 0–1.9)
BUN SERPL-MCNC: 23 MG/DL (ref 8–23)
BUN SERPL-MCNC: 26 MG/DL (ref 8–23)
CALCIUM SERPL-MCNC: 7.8 MG/DL (ref 8.7–10.5)
CALCIUM SERPL-MCNC: 8 MG/DL (ref 8.7–10.5)
CHLORIDE SERPL-SCNC: 103 MMOL/L (ref 95–110)
CHLORIDE SERPL-SCNC: 105 MMOL/L (ref 95–110)
CO2 SERPL-SCNC: 18 MMOL/L (ref 23–29)
CO2 SERPL-SCNC: 21 MMOL/L (ref 23–29)
CREAT SERPL-MCNC: 5 MG/DL (ref 0.5–1.4)
CREAT SERPL-MCNC: 5.5 MG/DL (ref 0.5–1.4)
DIFFERENTIAL METHOD BLD: ABNORMAL
EOSINOPHIL # BLD AUTO: 0.2 K/UL (ref 0–0.5)
EOSINOPHIL NFR BLD: 2 % (ref 0–8)
ERYTHROCYTE [DISTWIDTH] IN BLOOD BY AUTOMATED COUNT: 17.8 % (ref 11.5–14.5)
EST. GFR  (NO RACE VARIABLE): 11 ML/MIN/1.73 M^2
EST. GFR  (NO RACE VARIABLE): 12.3 ML/MIN/1.73 M^2
GLUCOSE SERPL-MCNC: 127 MG/DL (ref 70–110)
GLUCOSE SERPL-MCNC: 197 MG/DL (ref 70–110)
HCT VFR BLD AUTO: 26.9 % (ref 40–54)
HGB BLD-MCNC: 8.3 G/DL (ref 14–18)
IMM GRANULOCYTES # BLD AUTO: 0.15 K/UL (ref 0–0.04)
IMM GRANULOCYTES NFR BLD AUTO: 1.5 % (ref 0–0.5)
INR PPP: 2.2 (ref 0.8–1.2)
LDH SERPL L TO P-CCNC: 218 U/L (ref 110–260)
LYMPHOCYTES # BLD AUTO: 0.4 K/UL (ref 1–4.8)
LYMPHOCYTES NFR BLD: 4.1 % (ref 18–48)
MAGNESIUM SERPL-MCNC: 1.9 MG/DL (ref 1.6–2.6)
MCH RBC QN AUTO: 27 PG (ref 27–31)
MCHC RBC AUTO-ENTMCNC: 30.9 G/DL (ref 32–36)
MCV RBC AUTO: 88 FL (ref 82–98)
MONOCYTES # BLD AUTO: 0.7 K/UL (ref 0.3–1)
MONOCYTES NFR BLD: 6.9 % (ref 4–15)
NEUTROPHILS # BLD AUTO: 8.7 K/UL (ref 1.8–7.7)
NEUTROPHILS NFR BLD: 85.1 % (ref 38–73)
NRBC BLD-RTO: 0 /100 WBC
PHOSPHATE SERPL-MCNC: 2.5 MG/DL (ref 2.7–4.5)
PLATELET # BLD AUTO: 377 K/UL (ref 150–450)
PMV BLD AUTO: 9.4 FL (ref 9.2–12.9)
POTASSIUM SERPL-SCNC: 3 MMOL/L (ref 3.5–5.1)
POTASSIUM SERPL-SCNC: 4.1 MMOL/L (ref 3.5–5.1)
PROTHROMBIN TIME: 22.6 SEC (ref 9–12.5)
RBC # BLD AUTO: 3.07 M/UL (ref 4.6–6.2)
SODIUM SERPL-SCNC: 132 MMOL/L (ref 136–145)
SODIUM SERPL-SCNC: 134 MMOL/L (ref 136–145)
WBC # BLD AUTO: 10.24 K/UL (ref 3.9–12.7)

## 2024-08-04 PROCEDURE — 85610 PROTHROMBIN TIME: CPT | Performed by: STUDENT IN AN ORGANIZED HEALTH CARE EDUCATION/TRAINING PROGRAM

## 2024-08-04 PROCEDURE — 80048 BASIC METABOLIC PNL TOTAL CA: CPT | Performed by: STUDENT IN AN ORGANIZED HEALTH CARE EDUCATION/TRAINING PROGRAM

## 2024-08-04 PROCEDURE — 83735 ASSAY OF MAGNESIUM: CPT | Performed by: STUDENT IN AN ORGANIZED HEALTH CARE EDUCATION/TRAINING PROGRAM

## 2024-08-04 PROCEDURE — 94761 N-INVAS EAR/PLS OXIMETRY MLT: CPT

## 2024-08-04 PROCEDURE — 25000003 PHARM REV CODE 250: Performed by: STUDENT IN AN ORGANIZED HEALTH CARE EDUCATION/TRAINING PROGRAM

## 2024-08-04 PROCEDURE — 36415 COLL VENOUS BLD VENIPUNCTURE: CPT | Performed by: REGISTERED NURSE

## 2024-08-04 PROCEDURE — 80048 BASIC METABOLIC PNL TOTAL CA: CPT | Mod: 91 | Performed by: REGISTERED NURSE

## 2024-08-04 PROCEDURE — 84100 ASSAY OF PHOSPHORUS: CPT | Performed by: STUDENT IN AN ORGANIZED HEALTH CARE EDUCATION/TRAINING PROGRAM

## 2024-08-04 PROCEDURE — 99233 SBSQ HOSP IP/OBS HIGH 50: CPT | Mod: ,,, | Performed by: REGISTERED NURSE

## 2024-08-04 PROCEDURE — 83615 LACTATE (LD) (LDH) ENZYME: CPT | Performed by: STUDENT IN AN ORGANIZED HEALTH CARE EDUCATION/TRAINING PROGRAM

## 2024-08-04 PROCEDURE — 85730 THROMBOPLASTIN TIME PARTIAL: CPT | Performed by: STUDENT IN AN ORGANIZED HEALTH CARE EDUCATION/TRAINING PROGRAM

## 2024-08-04 PROCEDURE — 25000242 PHARM REV CODE 250 ALT 637 W/ HCPCS: Performed by: REGISTERED NURSE

## 2024-08-04 PROCEDURE — 27000207 HC ISOLATION

## 2024-08-04 PROCEDURE — 99233 SBSQ HOSP IP/OBS HIGH 50: CPT | Mod: ,,, | Performed by: STUDENT IN AN ORGANIZED HEALTH CARE EDUCATION/TRAINING PROGRAM

## 2024-08-04 PROCEDURE — 27000248 HC VAD-ADDITIONAL DAY

## 2024-08-04 PROCEDURE — 25000003 PHARM REV CODE 250: Performed by: INTERNAL MEDICINE

## 2024-08-04 PROCEDURE — 20600001 HC STEP DOWN PRIVATE ROOM

## 2024-08-04 PROCEDURE — 36415 COLL VENOUS BLD VENIPUNCTURE: CPT | Performed by: STUDENT IN AN ORGANIZED HEALTH CARE EDUCATION/TRAINING PROGRAM

## 2024-08-04 PROCEDURE — 85025 COMPLETE CBC W/AUTO DIFF WBC: CPT | Performed by: STUDENT IN AN ORGANIZED HEALTH CARE EDUCATION/TRAINING PROGRAM

## 2024-08-04 PROCEDURE — 93750 INTERROGATION VAD IN PERSON: CPT | Mod: ,,, | Performed by: INTERNAL MEDICINE

## 2024-08-04 RX ORDER — POTASSIUM CHLORIDE 20 MEQ/1
40 TABLET, EXTENDED RELEASE ORAL ONCE
Status: DISCONTINUED | OUTPATIENT
Start: 2024-08-04 | End: 2024-08-04

## 2024-08-04 RX ORDER — POTASSIUM CHLORIDE 750 MG/1
40 CAPSULE, EXTENDED RELEASE ORAL ONCE
Status: COMPLETED | OUTPATIENT
Start: 2024-08-04 | End: 2024-08-04

## 2024-08-04 RX ADMIN — ATORVASTATIN CALCIUM 40 MG: 20 TABLET, FILM COATED ORAL at 09:08

## 2024-08-04 RX ADMIN — PANTOPRAZOLE SODIUM 40 MG: 40 TABLET, DELAYED RELEASE ORAL at 08:08

## 2024-08-04 RX ADMIN — OMEGA-3-ACID ETHYL ESTERS 2 G: 1 CAPSULE, LIQUID FILLED ORAL at 08:08

## 2024-08-04 RX ADMIN — CHOLECALCIFEROL TAB 25 MCG (1000 UNIT) 1000 UNITS: 25 TAB at 08:08

## 2024-08-04 RX ADMIN — VANCOMYCIN HYDROCHLORIDE 125 MG: KIT at 11:08

## 2024-08-04 RX ADMIN — WARFARIN SODIUM 2.5 MG: 2.5 TABLET ORAL at 05:08

## 2024-08-04 RX ADMIN — VENLAFAXINE 37.5 MG: 37.5 TABLET ORAL at 08:08

## 2024-08-04 RX ADMIN — VANCOMYCIN HYDROCHLORIDE 125 MG: KIT at 05:08

## 2024-08-04 RX ADMIN — TRAZODONE HYDROCHLORIDE 25 MG: 50 TABLET ORAL at 11:08

## 2024-08-04 RX ADMIN — OMEGA-3-ACID ETHYL ESTERS 2 G: 1 CAPSULE, LIQUID FILLED ORAL at 09:08

## 2024-08-04 RX ADMIN — PSYLLIUM HUSK 1 PACKET: 3.4 POWDER ORAL at 11:08

## 2024-08-04 RX ADMIN — AMIODARONE HYDROCHLORIDE 400 MG: 200 TABLET ORAL at 08:08

## 2024-08-04 RX ADMIN — POTASSIUM CHLORIDE 40 MEQ: 750 CAPSULE, EXTENDED RELEASE ORAL at 08:08

## 2024-08-05 LAB
ALBUMIN SERPL BCP-MCNC: 1.8 G/DL (ref 3.5–5.2)
ALP SERPL-CCNC: 171 U/L (ref 55–135)
ALT SERPL W/O P-5'-P-CCNC: 21 U/L (ref 10–44)
ANION GAP SERPL CALC-SCNC: 4 MMOL/L (ref 8–16)
ANION GAP SERPL CALC-SCNC: 7 MMOL/L (ref 8–16)
APTT PPP: 43.3 SEC (ref 21–32)
AST SERPL-CCNC: 33 U/L (ref 10–40)
BACTERIA STL CULT: ABNORMAL
BACTERIA STL CULT: ABNORMAL
BASOPHILS # BLD AUTO: 0.05 K/UL (ref 0–0.2)
BASOPHILS NFR BLD: 0.7 % (ref 0–1.9)
BILIRUB DIRECT SERPL-MCNC: 0.1 MG/DL (ref 0.1–0.3)
BILIRUB SERPL-MCNC: 0.3 MG/DL (ref 0.1–1)
BNP SERPL-MCNC: 303 PG/ML (ref 0–99)
BUN SERPL-MCNC: 14 MG/DL (ref 8–23)
BUN SERPL-MCNC: 9 MG/DL (ref 8–23)
CALCIUM SERPL-MCNC: 7.7 MG/DL (ref 8.7–10.5)
CALCIUM SERPL-MCNC: 7.7 MG/DL (ref 8.7–10.5)
CHLORIDE SERPL-SCNC: 100 MMOL/L (ref 95–110)
CHLORIDE SERPL-SCNC: 102 MMOL/L (ref 95–110)
CO2 SERPL-SCNC: 22 MMOL/L (ref 23–29)
CO2 SERPL-SCNC: 29 MMOL/L (ref 23–29)
CREAT SERPL-MCNC: 1.9 MG/DL (ref 0.5–1.4)
CREAT SERPL-MCNC: 3.5 MG/DL (ref 0.5–1.4)
CRP SERPL-MCNC: 97 MG/L (ref 0–8.2)
DIFFERENTIAL METHOD BLD: ABNORMAL
EOSINOPHIL # BLD AUTO: 0.2 K/UL (ref 0–0.5)
EOSINOPHIL NFR BLD: 3.3 % (ref 0–8)
ERYTHROCYTE [DISTWIDTH] IN BLOOD BY AUTOMATED COUNT: 17.5 % (ref 11.5–14.5)
EST. GFR  (NO RACE VARIABLE): 18.9 ML/MIN/1.73 M^2
EST. GFR  (NO RACE VARIABLE): 39.4 ML/MIN/1.73 M^2
GLUCOSE SERPL-MCNC: 132 MG/DL (ref 70–110)
GLUCOSE SERPL-MCNC: 192 MG/DL (ref 70–110)
HBV SURFACE AG SERPL QL IA: NORMAL
HCT VFR BLD AUTO: 25.7 % (ref 40–54)
HGB BLD-MCNC: 8.1 G/DL (ref 14–18)
IMM GRANULOCYTES # BLD AUTO: 0.12 K/UL (ref 0–0.04)
IMM GRANULOCYTES NFR BLD AUTO: 1.6 % (ref 0–0.5)
INR PPP: 2.2 (ref 0.8–1.2)
LDH SERPL L TO P-CCNC: 208 U/L (ref 110–260)
LYMPHOCYTES # BLD AUTO: 0.5 K/UL (ref 1–4.8)
LYMPHOCYTES NFR BLD: 7.3 % (ref 18–48)
MAGNESIUM SERPL-MCNC: 1.6 MG/DL (ref 1.6–2.6)
MAGNESIUM SERPL-MCNC: 2.5 MG/DL (ref 1.6–2.6)
MCH RBC QN AUTO: 27.3 PG (ref 27–31)
MCHC RBC AUTO-ENTMCNC: 31.5 G/DL (ref 32–36)
MCV RBC AUTO: 87 FL (ref 82–98)
MONOCYTES # BLD AUTO: 0.6 K/UL (ref 0.3–1)
MONOCYTES NFR BLD: 7.8 % (ref 4–15)
NEUTROPHILS # BLD AUTO: 5.8 K/UL (ref 1.8–7.7)
NEUTROPHILS NFR BLD: 79.3 % (ref 38–73)
NRBC BLD-RTO: 0 /100 WBC
PHOSPHATE SERPL-MCNC: 2.6 MG/DL (ref 2.7–4.5)
PHOSPHATE SERPL-MCNC: <1 MG/DL (ref 2.7–4.5)
PLATELET # BLD AUTO: 366 K/UL (ref 150–450)
PMV BLD AUTO: 9.6 FL (ref 9.2–12.9)
POTASSIUM SERPL-SCNC: 2.9 MMOL/L (ref 3.5–5.1)
POTASSIUM SERPL-SCNC: 4 MMOL/L (ref 3.5–5.1)
PREALB SERPL-MCNC: 10 MG/DL (ref 20–43)
PROT SERPL-MCNC: 5.7 G/DL (ref 6–8.4)
PROTHROMBIN TIME: 23 SEC (ref 9–12.5)
RBC # BLD AUTO: 2.97 M/UL (ref 4.6–6.2)
SODIUM SERPL-SCNC: 131 MMOL/L (ref 136–145)
SODIUM SERPL-SCNC: 133 MMOL/L (ref 136–145)
WBC # BLD AUTO: 7.35 K/UL (ref 3.9–12.7)

## 2024-08-05 PROCEDURE — 63600175 PHARM REV CODE 636 W HCPCS

## 2024-08-05 PROCEDURE — 87340 HEPATITIS B SURFACE AG IA: CPT | Performed by: NURSE PRACTITIONER

## 2024-08-05 PROCEDURE — 80076 HEPATIC FUNCTION PANEL: CPT | Performed by: STUDENT IN AN ORGANIZED HEALTH CARE EDUCATION/TRAINING PROGRAM

## 2024-08-05 PROCEDURE — 25000003 PHARM REV CODE 250: Performed by: REGISTERED NURSE

## 2024-08-05 PROCEDURE — 99233 SBSQ HOSP IP/OBS HIGH 50: CPT | Mod: ,,, | Performed by: REGISTERED NURSE

## 2024-08-05 PROCEDURE — 84134 ASSAY OF PREALBUMIN: CPT | Performed by: STUDENT IN AN ORGANIZED HEALTH CARE EDUCATION/TRAINING PROGRAM

## 2024-08-05 PROCEDURE — 84100 ASSAY OF PHOSPHORUS: CPT | Mod: 91 | Performed by: REGISTERED NURSE

## 2024-08-05 PROCEDURE — 20600001 HC STEP DOWN PRIVATE ROOM

## 2024-08-05 PROCEDURE — 84100 ASSAY OF PHOSPHORUS: CPT | Performed by: STUDENT IN AN ORGANIZED HEALTH CARE EDUCATION/TRAINING PROGRAM

## 2024-08-05 PROCEDURE — 85610 PROTHROMBIN TIME: CPT | Performed by: STUDENT IN AN ORGANIZED HEALTH CARE EDUCATION/TRAINING PROGRAM

## 2024-08-05 PROCEDURE — 85730 THROMBOPLASTIN TIME PARTIAL: CPT | Performed by: STUDENT IN AN ORGANIZED HEALTH CARE EDUCATION/TRAINING PROGRAM

## 2024-08-05 PROCEDURE — 86140 C-REACTIVE PROTEIN: CPT | Performed by: STUDENT IN AN ORGANIZED HEALTH CARE EDUCATION/TRAINING PROGRAM

## 2024-08-05 PROCEDURE — 27000207 HC ISOLATION

## 2024-08-05 PROCEDURE — 80048 BASIC METABOLIC PNL TOTAL CA: CPT | Performed by: STUDENT IN AN ORGANIZED HEALTH CARE EDUCATION/TRAINING PROGRAM

## 2024-08-05 PROCEDURE — 99233 SBSQ HOSP IP/OBS HIGH 50: CPT | Mod: ,,, | Performed by: STUDENT IN AN ORGANIZED HEALTH CARE EDUCATION/TRAINING PROGRAM

## 2024-08-05 PROCEDURE — 80100014 HC HEMODIALYSIS 1:1

## 2024-08-05 PROCEDURE — 25000003 PHARM REV CODE 250: Performed by: STUDENT IN AN ORGANIZED HEALTH CARE EDUCATION/TRAINING PROGRAM

## 2024-08-05 PROCEDURE — 83880 ASSAY OF NATRIURETIC PEPTIDE: CPT | Performed by: STUDENT IN AN ORGANIZED HEALTH CARE EDUCATION/TRAINING PROGRAM

## 2024-08-05 PROCEDURE — 36415 COLL VENOUS BLD VENIPUNCTURE: CPT | Performed by: REGISTERED NURSE

## 2024-08-05 PROCEDURE — 83735 ASSAY OF MAGNESIUM: CPT | Performed by: STUDENT IN AN ORGANIZED HEALTH CARE EDUCATION/TRAINING PROGRAM

## 2024-08-05 PROCEDURE — 83735 ASSAY OF MAGNESIUM: CPT | Mod: 91 | Performed by: REGISTERED NURSE

## 2024-08-05 PROCEDURE — 25000242 PHARM REV CODE 250 ALT 637 W/ HCPCS: Performed by: REGISTERED NURSE

## 2024-08-05 PROCEDURE — 94761 N-INVAS EAR/PLS OXIMETRY MLT: CPT

## 2024-08-05 PROCEDURE — 27000248 HC VAD-ADDITIONAL DAY

## 2024-08-05 PROCEDURE — 80048 BASIC METABOLIC PNL TOTAL CA: CPT | Mod: 91 | Performed by: REGISTERED NURSE

## 2024-08-05 PROCEDURE — 85025 COMPLETE CBC W/AUTO DIFF WBC: CPT | Performed by: STUDENT IN AN ORGANIZED HEALTH CARE EDUCATION/TRAINING PROGRAM

## 2024-08-05 PROCEDURE — 36415 COLL VENOUS BLD VENIPUNCTURE: CPT | Performed by: STUDENT IN AN ORGANIZED HEALTH CARE EDUCATION/TRAINING PROGRAM

## 2024-08-05 PROCEDURE — 99232 SBSQ HOSP IP/OBS MODERATE 35: CPT | Mod: ,,, | Performed by: NURSE PRACTITIONER

## 2024-08-05 PROCEDURE — 83615 LACTATE (LD) (LDH) ENZYME: CPT | Performed by: STUDENT IN AN ORGANIZED HEALTH CARE EDUCATION/TRAINING PROGRAM

## 2024-08-05 PROCEDURE — 93750 INTERROGATION VAD IN PERSON: CPT | Mod: ,,, | Performed by: INTERNAL MEDICINE

## 2024-08-05 RX ORDER — OXYCODONE AND ACETAMINOPHEN 5; 325 MG/1; MG/1
1 TABLET ORAL EVERY 4 HOURS PRN
Status: DISCONTINUED | OUTPATIENT
Start: 2024-08-05 | End: 2024-08-07 | Stop reason: HOSPADM

## 2024-08-05 RX ORDER — POTASSIUM CHLORIDE 20 MEQ/1
40 TABLET, EXTENDED RELEASE ORAL ONCE
Status: COMPLETED | OUTPATIENT
Start: 2024-08-05 | End: 2024-08-05

## 2024-08-05 RX ADMIN — POTASSIUM PHOSPHATE, MONOBASIC AND POTASSIUM PHOSPHATE, DIBASIC 15 MMOL: 224; 236 INJECTION, SOLUTION, CONCENTRATE INTRAVENOUS at 09:08

## 2024-08-05 RX ADMIN — ERYTHROPOIETIN 3720 UNITS: 4000 INJECTION, SOLUTION INTRAVENOUS; SUBCUTANEOUS at 05:08

## 2024-08-05 RX ADMIN — VANCOMYCIN HYDROCHLORIDE 125 MG: KIT at 11:08

## 2024-08-05 RX ADMIN — VENLAFAXINE 37.5 MG: 37.5 TABLET ORAL at 09:08

## 2024-08-05 RX ADMIN — VANCOMYCIN HYDROCHLORIDE 125 MG: KIT at 12:08

## 2024-08-05 RX ADMIN — PSYLLIUM HUSK 1 PACKET: 3.4 POWDER ORAL at 09:08

## 2024-08-05 RX ADMIN — VANCOMYCIN HYDROCHLORIDE 125 MG: KIT at 05:08

## 2024-08-05 RX ADMIN — OXYCODONE HYDROCHLORIDE AND ACETAMINOPHEN 1 TABLET: 5; 325 TABLET ORAL at 06:08

## 2024-08-05 RX ADMIN — OMEGA-3-ACID ETHYL ESTERS 2 G: 1 CAPSULE, LIQUID FILLED ORAL at 09:08

## 2024-08-05 RX ADMIN — ATORVASTATIN CALCIUM 40 MG: 20 TABLET, FILM COATED ORAL at 09:08

## 2024-08-05 RX ADMIN — PANTOPRAZOLE SODIUM 40 MG: 40 TABLET, DELAYED RELEASE ORAL at 09:08

## 2024-08-05 RX ADMIN — TRAZODONE HYDROCHLORIDE 25 MG: 50 TABLET ORAL at 09:08

## 2024-08-05 RX ADMIN — VANCOMYCIN HYDROCHLORIDE 125 MG: KIT at 06:08

## 2024-08-05 RX ADMIN — CHOLECALCIFEROL TAB 25 MCG (1000 UNIT) 1000 UNITS: 25 TAB at 09:08

## 2024-08-05 RX ADMIN — WARFARIN SODIUM 2.5 MG: 2.5 TABLET ORAL at 05:08

## 2024-08-05 RX ADMIN — AMIODARONE HYDROCHLORIDE 400 MG: 200 TABLET ORAL at 09:08

## 2024-08-05 RX ADMIN — POTASSIUM CHLORIDE 40 MEQ: 1500 TABLET, EXTENDED RELEASE ORAL at 09:08

## 2024-08-06 LAB
ANION GAP SERPL CALC-SCNC: 8 MMOL/L (ref 8–16)
ANISOCYTOSIS BLD QL SMEAR: SLIGHT
APTT PPP: 45.9 SEC (ref 21–32)
BASOPHILS # BLD AUTO: 0.04 K/UL (ref 0–0.2)
BASOPHILS NFR BLD: 0.5 % (ref 0–1.9)
BUN SERPL-MCNC: 19 MG/DL (ref 8–23)
CALCIUM SERPL-MCNC: 7.6 MG/DL (ref 8.7–10.5)
CHLORIDE SERPL-SCNC: 102 MMOL/L (ref 95–110)
CO2 SERPL-SCNC: 24 MMOL/L (ref 23–29)
CREAT SERPL-MCNC: 4 MG/DL (ref 0.5–1.4)
DIFFERENTIAL METHOD BLD: ABNORMAL
EOSINOPHIL # BLD AUTO: 0.3 K/UL (ref 0–0.5)
EOSINOPHIL NFR BLD: 3.2 % (ref 0–8)
ERYTHROCYTE [DISTWIDTH] IN BLOOD BY AUTOMATED COUNT: 17.7 % (ref 11.5–14.5)
EST. GFR  (NO RACE VARIABLE): 16.1 ML/MIN/1.73 M^2
GLUCOSE SERPL-MCNC: 113 MG/DL (ref 70–110)
HCT VFR BLD AUTO: 27.1 % (ref 40–54)
HGB BLD-MCNC: 8.4 G/DL (ref 14–18)
HYPOCHROMIA BLD QL SMEAR: ABNORMAL
IMM GRANULOCYTES # BLD AUTO: 0.13 K/UL (ref 0–0.04)
IMM GRANULOCYTES NFR BLD AUTO: 1.6 % (ref 0–0.5)
INR PPP: 2.3 (ref 0.8–1.2)
LDH SERPL L TO P-CCNC: 234 U/L (ref 110–260)
LYMPHOCYTES # BLD AUTO: 1 K/UL (ref 1–4.8)
LYMPHOCYTES NFR BLD: 11.7 % (ref 18–48)
MAGNESIUM SERPL-MCNC: 1.8 MG/DL (ref 1.6–2.6)
MCH RBC QN AUTO: 26.8 PG (ref 27–31)
MCHC RBC AUTO-ENTMCNC: 31 G/DL (ref 32–36)
MCV RBC AUTO: 87 FL (ref 82–98)
MONOCYTES # BLD AUTO: 0.8 K/UL (ref 0.3–1)
MONOCYTES NFR BLD: 9.6 % (ref 4–15)
NEUTROPHILS # BLD AUTO: 6 K/UL (ref 1.8–7.7)
NEUTROPHILS NFR BLD: 73.4 % (ref 38–73)
NRBC BLD-RTO: 0 /100 WBC
OVALOCYTES BLD QL SMEAR: ABNORMAL
PHOSPHATE SERPL-MCNC: 2.2 MG/DL (ref 2.7–4.5)
PLATELET # BLD AUTO: 370 K/UL (ref 150–450)
PMV BLD AUTO: 9.4 FL (ref 9.2–12.9)
POIKILOCYTOSIS BLD QL SMEAR: SLIGHT
POLYCHROMASIA BLD QL SMEAR: ABNORMAL
POTASSIUM SERPL-SCNC: 3.4 MMOL/L (ref 3.5–5.1)
PROTHROMBIN TIME: 24 SEC (ref 9–12.5)
RBC # BLD AUTO: 3.13 M/UL (ref 4.6–6.2)
SODIUM SERPL-SCNC: 134 MMOL/L (ref 136–145)
SPHEROCYTES BLD QL SMEAR: ABNORMAL
WBC # BLD AUTO: 8.22 K/UL (ref 3.9–12.7)

## 2024-08-06 PROCEDURE — 25000003 PHARM REV CODE 250: Performed by: STUDENT IN AN ORGANIZED HEALTH CARE EDUCATION/TRAINING PROGRAM

## 2024-08-06 PROCEDURE — 93750 INTERROGATION VAD IN PERSON: CPT | Mod: ,,, | Performed by: INTERNAL MEDICINE

## 2024-08-06 PROCEDURE — 27000207 HC ISOLATION

## 2024-08-06 PROCEDURE — 85730 THROMBOPLASTIN TIME PARTIAL: CPT | Performed by: STUDENT IN AN ORGANIZED HEALTH CARE EDUCATION/TRAINING PROGRAM

## 2024-08-06 PROCEDURE — 36415 COLL VENOUS BLD VENIPUNCTURE: CPT | Performed by: STUDENT IN AN ORGANIZED HEALTH CARE EDUCATION/TRAINING PROGRAM

## 2024-08-06 PROCEDURE — 80048 BASIC METABOLIC PNL TOTAL CA: CPT | Performed by: STUDENT IN AN ORGANIZED HEALTH CARE EDUCATION/TRAINING PROGRAM

## 2024-08-06 PROCEDURE — 20600001 HC STEP DOWN PRIVATE ROOM

## 2024-08-06 PROCEDURE — 83735 ASSAY OF MAGNESIUM: CPT | Performed by: STUDENT IN AN ORGANIZED HEALTH CARE EDUCATION/TRAINING PROGRAM

## 2024-08-06 PROCEDURE — 85610 PROTHROMBIN TIME: CPT | Performed by: STUDENT IN AN ORGANIZED HEALTH CARE EDUCATION/TRAINING PROGRAM

## 2024-08-06 PROCEDURE — 27000248 HC VAD-ADDITIONAL DAY

## 2024-08-06 PROCEDURE — 85025 COMPLETE CBC W/AUTO DIFF WBC: CPT | Performed by: STUDENT IN AN ORGANIZED HEALTH CARE EDUCATION/TRAINING PROGRAM

## 2024-08-06 PROCEDURE — 25000242 PHARM REV CODE 250 ALT 637 W/ HCPCS: Performed by: REGISTERED NURSE

## 2024-08-06 PROCEDURE — 84100 ASSAY OF PHOSPHORUS: CPT | Performed by: STUDENT IN AN ORGANIZED HEALTH CARE EDUCATION/TRAINING PROGRAM

## 2024-08-06 PROCEDURE — 99232 SBSQ HOSP IP/OBS MODERATE 35: CPT | Mod: ,,, | Performed by: NURSE PRACTITIONER

## 2024-08-06 PROCEDURE — 83615 LACTATE (LD) (LDH) ENZYME: CPT | Performed by: STUDENT IN AN ORGANIZED HEALTH CARE EDUCATION/TRAINING PROGRAM

## 2024-08-06 RX ORDER — POTASSIUM CHLORIDE 750 MG/1
30 CAPSULE, EXTENDED RELEASE ORAL ONCE
Status: COMPLETED | OUTPATIENT
Start: 2024-08-06 | End: 2024-08-06

## 2024-08-06 RX ORDER — LANOLIN ALCOHOL/MO/W.PET/CERES
400 CREAM (GRAM) TOPICAL ONCE
Status: COMPLETED | OUTPATIENT
Start: 2024-08-06 | End: 2024-08-06

## 2024-08-06 RX ADMIN — TRAZODONE HYDROCHLORIDE 25 MG: 50 TABLET ORAL at 08:08

## 2024-08-06 RX ADMIN — WARFARIN SODIUM 2.5 MG: 2.5 TABLET ORAL at 05:08

## 2024-08-06 RX ADMIN — CHOLECALCIFEROL TAB 25 MCG (1000 UNIT) 1000 UNITS: 25 TAB at 09:08

## 2024-08-06 RX ADMIN — POTASSIUM CHLORIDE 30 MEQ: 750 CAPSULE, EXTENDED RELEASE ORAL at 06:08

## 2024-08-06 RX ADMIN — ATORVASTATIN CALCIUM 40 MG: 20 TABLET, FILM COATED ORAL at 08:08

## 2024-08-06 RX ADMIN — OMEGA-3-ACID ETHYL ESTERS 2 G: 1 CAPSULE, LIQUID FILLED ORAL at 09:08

## 2024-08-06 RX ADMIN — OXYCODONE HYDROCHLORIDE AND ACETAMINOPHEN 1 TABLET: 5; 325 TABLET ORAL at 05:08

## 2024-08-06 RX ADMIN — PANTOPRAZOLE SODIUM 40 MG: 40 TABLET, DELAYED RELEASE ORAL at 09:08

## 2024-08-06 RX ADMIN — VANCOMYCIN HYDROCHLORIDE 125 MG: KIT at 05:08

## 2024-08-06 RX ADMIN — PSYLLIUM HUSK 1 PACKET: 3.4 POWDER ORAL at 09:08

## 2024-08-06 RX ADMIN — Medication 400 MG: at 06:08

## 2024-08-06 RX ADMIN — VENLAFAXINE 37.5 MG: 37.5 TABLET ORAL at 09:08

## 2024-08-06 RX ADMIN — VANCOMYCIN HYDROCHLORIDE 125 MG: KIT at 06:08

## 2024-08-06 RX ADMIN — AMIODARONE HYDROCHLORIDE 400 MG: 200 TABLET ORAL at 09:08

## 2024-08-06 RX ADMIN — VANCOMYCIN HYDROCHLORIDE 125 MG: KIT at 11:08

## 2024-08-06 RX ADMIN — OMEGA-3-ACID ETHYL ESTERS 2 G: 1 CAPSULE, LIQUID FILLED ORAL at 08:08

## 2024-08-07 VITALS
SYSTOLIC BLOOD PRESSURE: 98 MMHG | BODY MASS INDEX: 22.03 KG/M2 | DIASTOLIC BLOOD PRESSURE: 56 MMHG | RESPIRATION RATE: 17 BRPM | HEIGHT: 73 IN | OXYGEN SATURATION: 98 % | HEART RATE: 80 BPM | TEMPERATURE: 98 F | WEIGHT: 166.25 LBS

## 2024-08-07 LAB
ALBUMIN SERPL BCP-MCNC: 1.9 G/DL (ref 3.5–5.2)
ALP SERPL-CCNC: 152 U/L (ref 55–135)
ALT SERPL W/O P-5'-P-CCNC: 21 U/L (ref 10–44)
ANION GAP SERPL CALC-SCNC: 9 MMOL/L (ref 8–16)
APTT PPP: 47.7 SEC (ref 21–32)
AST SERPL-CCNC: 34 U/L (ref 10–40)
BACTERIA BLD CULT: NORMAL
BACTERIA BLD CULT: NORMAL
BASOPHILS # BLD AUTO: 0.05 K/UL (ref 0–0.2)
BASOPHILS NFR BLD: 0.8 % (ref 0–1.9)
BILIRUB DIRECT SERPL-MCNC: 0.2 MG/DL (ref 0.1–0.3)
BILIRUB SERPL-MCNC: 0.3 MG/DL (ref 0.1–1)
BNP SERPL-MCNC: 735 PG/ML (ref 0–99)
BUN SERPL-MCNC: 24 MG/DL (ref 8–23)
CALCIUM SERPL-MCNC: 7.8 MG/DL (ref 8.7–10.5)
CHLORIDE SERPL-SCNC: 103 MMOL/L (ref 95–110)
CO2 SERPL-SCNC: 20 MMOL/L (ref 23–29)
CREAT SERPL-MCNC: 4.7 MG/DL (ref 0.5–1.4)
CRP SERPL-MCNC: 64.8 MG/L (ref 0–8.2)
DIFFERENTIAL METHOD BLD: ABNORMAL
EOSINOPHIL # BLD AUTO: 0.3 K/UL (ref 0–0.5)
EOSINOPHIL NFR BLD: 4.6 % (ref 0–8)
ERYTHROCYTE [DISTWIDTH] IN BLOOD BY AUTOMATED COUNT: 17.5 % (ref 11.5–14.5)
EST. GFR  (NO RACE VARIABLE): 13.3 ML/MIN/1.73 M^2
GLUCOSE SERPL-MCNC: 112 MG/DL (ref 70–110)
HCT VFR BLD AUTO: 26.7 % (ref 40–54)
HGB BLD-MCNC: 8.1 G/DL (ref 14–18)
IMM GRANULOCYTES # BLD AUTO: 0.1 K/UL (ref 0–0.04)
IMM GRANULOCYTES NFR BLD AUTO: 1.5 % (ref 0–0.5)
INR PPP: 2.5 (ref 0.8–1.2)
LDH SERPL L TO P-CCNC: 222 U/L (ref 110–260)
LYMPHOCYTES # BLD AUTO: 0.8 K/UL (ref 1–4.8)
LYMPHOCYTES NFR BLD: 12.2 % (ref 18–48)
MAGNESIUM SERPL-MCNC: 1.8 MG/DL (ref 1.6–2.6)
MCH RBC QN AUTO: 27.1 PG (ref 27–31)
MCHC RBC AUTO-ENTMCNC: 30.3 G/DL (ref 32–36)
MCV RBC AUTO: 89 FL (ref 82–98)
MONOCYTES # BLD AUTO: 0.6 K/UL (ref 0.3–1)
MONOCYTES NFR BLD: 8.6 % (ref 4–15)
NEUTROPHILS # BLD AUTO: 4.7 K/UL (ref 1.8–7.7)
NEUTROPHILS NFR BLD: 72.3 % (ref 38–73)
NRBC BLD-RTO: 0 /100 WBC
PHOSPHATE SERPL-MCNC: 2.8 MG/DL (ref 2.7–4.5)
PLATELET # BLD AUTO: 346 K/UL (ref 150–450)
PMV BLD AUTO: 8.9 FL (ref 9.2–12.9)
POTASSIUM SERPL-SCNC: 3.5 MMOL/L (ref 3.5–5.1)
PREALB SERPL-MCNC: 12 MG/DL (ref 20–43)
PROT SERPL-MCNC: 5.6 G/DL (ref 6–8.4)
PROTHROMBIN TIME: 26.1 SEC (ref 9–12.5)
RBC # BLD AUTO: 2.99 M/UL (ref 4.6–6.2)
SODIUM SERPL-SCNC: 132 MMOL/L (ref 136–145)
WBC # BLD AUTO: 6.48 K/UL (ref 3.9–12.7)

## 2024-08-07 PROCEDURE — 83880 ASSAY OF NATRIURETIC PEPTIDE: CPT | Performed by: STUDENT IN AN ORGANIZED HEALTH CARE EDUCATION/TRAINING PROGRAM

## 2024-08-07 PROCEDURE — 80048 BASIC METABOLIC PNL TOTAL CA: CPT | Performed by: STUDENT IN AN ORGANIZED HEALTH CARE EDUCATION/TRAINING PROGRAM

## 2024-08-07 PROCEDURE — 93750 INTERROGATION VAD IN PERSON: CPT | Mod: ,,, | Performed by: INTERNAL MEDICINE

## 2024-08-07 PROCEDURE — 99232 SBSQ HOSP IP/OBS MODERATE 35: CPT | Mod: ,,, | Performed by: NURSE PRACTITIONER

## 2024-08-07 PROCEDURE — 85730 THROMBOPLASTIN TIME PARTIAL: CPT | Performed by: STUDENT IN AN ORGANIZED HEALTH CARE EDUCATION/TRAINING PROGRAM

## 2024-08-07 PROCEDURE — 85025 COMPLETE CBC W/AUTO DIFF WBC: CPT | Performed by: STUDENT IN AN ORGANIZED HEALTH CARE EDUCATION/TRAINING PROGRAM

## 2024-08-07 PROCEDURE — 84134 ASSAY OF PREALBUMIN: CPT | Performed by: STUDENT IN AN ORGANIZED HEALTH CARE EDUCATION/TRAINING PROGRAM

## 2024-08-07 PROCEDURE — 27000248 HC VAD-ADDITIONAL DAY

## 2024-08-07 PROCEDURE — 83615 LACTATE (LD) (LDH) ENZYME: CPT | Performed by: STUDENT IN AN ORGANIZED HEALTH CARE EDUCATION/TRAINING PROGRAM

## 2024-08-07 PROCEDURE — 83735 ASSAY OF MAGNESIUM: CPT | Performed by: STUDENT IN AN ORGANIZED HEALTH CARE EDUCATION/TRAINING PROGRAM

## 2024-08-07 PROCEDURE — 25000242 PHARM REV CODE 250 ALT 637 W/ HCPCS: Performed by: REGISTERED NURSE

## 2024-08-07 PROCEDURE — 84100 ASSAY OF PHOSPHORUS: CPT | Performed by: STUDENT IN AN ORGANIZED HEALTH CARE EDUCATION/TRAINING PROGRAM

## 2024-08-07 PROCEDURE — 25000003 PHARM REV CODE 250: Performed by: STUDENT IN AN ORGANIZED HEALTH CARE EDUCATION/TRAINING PROGRAM

## 2024-08-07 PROCEDURE — 80076 HEPATIC FUNCTION PANEL: CPT | Performed by: STUDENT IN AN ORGANIZED HEALTH CARE EDUCATION/TRAINING PROGRAM

## 2024-08-07 PROCEDURE — 80100014 HC HEMODIALYSIS 1:1

## 2024-08-07 PROCEDURE — 85610 PROTHROMBIN TIME: CPT | Performed by: STUDENT IN AN ORGANIZED HEALTH CARE EDUCATION/TRAINING PROGRAM

## 2024-08-07 PROCEDURE — 86140 C-REACTIVE PROTEIN: CPT | Performed by: STUDENT IN AN ORGANIZED HEALTH CARE EDUCATION/TRAINING PROGRAM

## 2024-08-07 PROCEDURE — 25000003 PHARM REV CODE 250: Performed by: INTERNAL MEDICINE

## 2024-08-07 RX ORDER — POTASSIUM CHLORIDE 750 MG/1
20 CAPSULE, EXTENDED RELEASE ORAL ONCE
Status: DISCONTINUED | OUTPATIENT
Start: 2024-08-07 | End: 2024-08-07 | Stop reason: HOSPADM

## 2024-08-07 RX ORDER — POTASSIUM CHLORIDE 20 MEQ/1
20 TABLET, EXTENDED RELEASE ORAL ONCE
Status: DISCONTINUED | OUTPATIENT
Start: 2024-08-07 | End: 2024-08-07

## 2024-08-07 RX ORDER — SODIUM CHLORIDE 9 MG/ML
INJECTION, SOLUTION INTRAVENOUS ONCE
Status: CANCELLED | OUTPATIENT
Start: 2024-08-07 | End: 2024-08-07

## 2024-08-07 RX ORDER — VANCOMYCIN HYDROCHLORIDE 125 MG/1
125 CAPSULE ORAL EVERY 6 HOURS
Qty: 20 CAPSULE | Refills: 0 | Status: SHIPPED | OUTPATIENT
Start: 2024-08-07 | End: 2024-08-12

## 2024-08-07 RX ADMIN — VANCOMYCIN HYDROCHLORIDE 125 MG: KIT at 12:08

## 2024-08-07 RX ADMIN — AMIODARONE HYDROCHLORIDE 400 MG: 200 TABLET ORAL at 09:08

## 2024-08-07 RX ADMIN — POTASSIUM CHLORIDE 20 MEQ: 1500 TABLET, EXTENDED RELEASE ORAL at 05:08

## 2024-08-07 RX ADMIN — OMEGA-3-ACID ETHYL ESTERS 2 G: 1 CAPSULE, LIQUID FILLED ORAL at 09:08

## 2024-08-07 RX ADMIN — PANTOPRAZOLE SODIUM 40 MG: 40 TABLET, DELAYED RELEASE ORAL at 09:08

## 2024-08-07 RX ADMIN — CHOLECALCIFEROL TAB 25 MCG (1000 UNIT) 1000 UNITS: 25 TAB at 09:08

## 2024-08-07 RX ADMIN — VENLAFAXINE 37.5 MG: 37.5 TABLET ORAL at 09:08

## 2024-08-07 RX ADMIN — WARFARIN SODIUM 2.5 MG: 2.5 TABLET ORAL at 06:08

## 2024-08-07 RX ADMIN — VANCOMYCIN HYDROCHLORIDE 125 MG: KIT at 05:08

## 2024-08-07 RX ADMIN — PSYLLIUM HUSK 1 PACKET: 3.4 POWDER ORAL at 09:08

## 2024-08-08 DIAGNOSIS — Z95.811 HEART REPLACED BY HEART ASSIST DEVICE: Primary | ICD-10-CM

## 2024-08-08 NOTE — ASSESSMENT & PLAN NOTE
[facility]     Patient Identification:  Name:  Reny Elena  Age:  68 y.o.  Sex:  female  :  1956  MRN:  [id number]  Visit Number:  [id number]  ROOM: [address] [place] [address]     Primary Care Provider:  Susan Stephens APRN     Date of Admission: 2024    Length of stay in inpatient status:  5    Subjective     Chief Compliant:    Chief Complaint   Patient presents with    Vomiting       Patients nausea improving along with improved FSBG trend. Discussed update with  via phone call during am rounds        Objective       Vital Signs:  Temp:  [97.7 °F (36.5 °C)-98.4 °F (36.9 °C)] 97.8 °F (36.6 °C)  Heart Rate:  [] 96  Resp:  [18-19] 18  BP: (131-157)/(59-71) 137/67  SpO2:  [97 %] 97 %  on   ;   Device (Oxygen Therapy): room air  Body mass index is 17.15 kg/m².      ----------------------------------------------------------------------------------------------------------------------  Physical Exam  Vitals and nursing note reviewed.   Constitutional:       General: She is not in acute distress.  HENT:      Head: Normocephalic and atraumatic.   Eyes:      General: No scleral icterus.     Extraocular Movements: Extraocular movements intact.   Cardiovascular:      Rate and Rhythm: Normal rate.   Pulmonary:      Effort: Pulmonary effort is normal. No respiratory distress.   Abdominal:      General: There is no distension.      Palpations: Abdomen is soft.   Musculoskeletal:      Comments: R heel with clean dressing overlying   Neurological:      Mental Status: She is alert. Mental status is at baseline.      Cranial Nerves: No cranial nerve deficit.   Psychiatric:         Mood and Affect: Mood normal.         Behavior: Behavior normal.       ----------------------------------------------------------------------------------------------------------------------          Assessment & Plan      Right heel ulcer: Concern for exposed bone with probable osteomyelitis. No  Patient is identified as having Combined Systolic and Diastolic heart failure that is Acute on chronic. CHF is currently uncontrolled due to JVD, Rales/crackles on pulmonary exam, and Pulmonary edema/pleural effusion on CXR. Latest ECHO performed and demonstrates- Results for orders placed during the hospital encounter of 11/09/23    Echo    Interpretation Summary    Left Ventricle: The left ventricle is moderately dilated. Mildly increased ventricular mass. Normal wall thickness. There is eccentric hypertrophy. Severe global hyperkinesis present. There is severely reduced systolic function with a visually estimated ejection fraction of 10 -15%. Grade III diastolic dysfunction.    Right Ventricle: Normal right ventricular cavity size. Systolic function is normal. Pacemaker lead present in the ventricle.    Left Atrium: Left atrium is severely dilated.    Mitral Valve: There is annular and bileaflet tethering. There is moderate regurgitation with a centrally directed jet.    Tricuspid Valve: There is mild regurgitation with a centrally directed jet.    Pulmonary Artery: The estimated pulmonary artery systolic pressure is 41 mmHg.    IVC/SVC: Normal venous pressure at 3 mmHg.    last BNP reviewed- and noted below   Recent Labs   Lab 12/11/23  0405   BNP 2,154*       - management per primary team  - LVAD for mechanical support and epinephrine for ionotropic support  - UF with SLED   definitive abscess on CT. Wound culture from 7/26 revealing Enterococcus faecalis and mixed gram negative maurisio with repeat culture revealing mixed gram negative maurisio. Currently on broad spectrum IV antibiotics including Vanc, Rocephin and Flagyl. Surgery consulted and in in setting of pt's nonambulatory status and hardware in RLE, is recommending to proceed with right AKA. Surgery planned for 8/5 but canceled as pt deferred. Surgery discussing possible tentative date for surgery. ID and surgery input appreciated. To OR Friday so long as patient remains agreeable       UTI: Recent culture from 7/21 revealed E coli with repeat culture revealing low colony count of E coli. Cont Rocephin.      Possible colitis: Pt reports recent N/V, generalized abd pain and decreased PO intake. CT abd/pelvis reveals findings possibly representing mild distal colitis. Cont Rocephin and Flagyl. Supportive treatment.      ESRD on PD: Concern for dehydration in the setting of above. IVF's initiated on admission per nephrology. Nephrology planning for PD tonight. Nephrology input appreciated.      DM II, insulin dependent: Episodes of hypoglycemia intermittently. Cont hypoglycemia protocol and SSI with Accuchecks. Stopped SSI given mild hyperglycemia and significant sensitivity w/sx reported this am     Severe malnutrition: Supportive treatment.      Anxiety and depression: Psychiatry consulted with recs to increase Prozac. Supportive treatment.       Code Status and Medical Interventions: No CPR (Do Not Attempt to Resuscitate); Limited Support; No intubation (DNI)   Ordered at: 08/04/24 7285     Medical Intervention Limits:    No intubation (DNI)     Code Status (Patient has no pulse and is not breathing):    No CPR (Do Not Attempt to Resuscitate)     Medical Interventions (Patient has pulse or is breathing):    Limited Support         Disposition: OR Friday for definitive management    I have reviewed any copied/forwarded text or data,  verified its accuracy, and updated as necessary above.    Buddy Garcia MD  [facility] Hospitalist  08/08/24  19:38 EDT

## 2024-08-09 ENCOUNTER — PATIENT MESSAGE (OUTPATIENT)
Dept: TRANSPLANT | Facility: CLINIC | Age: 64
End: 2024-08-09
Payer: MEDICAID

## 2024-08-12 ENCOUNTER — LAB VISIT (OUTPATIENT)
Dept: LAB | Facility: HOSPITAL | Age: 64
End: 2024-08-12
Attending: INTERNAL MEDICINE
Payer: MEDICAID

## 2024-08-12 ENCOUNTER — DOCUMENT SCAN (OUTPATIENT)
Dept: HOME HEALTH SERVICES | Facility: HOSPITAL | Age: 64
End: 2024-08-12
Payer: MEDICAID

## 2024-08-12 DIAGNOSIS — Z95.811 LVAD (LEFT VENTRICULAR ASSIST DEVICE) PRESENT: ICD-10-CM

## 2024-08-12 DIAGNOSIS — Z95.811 PRESENCE OF VENTRICULAR ASSIST DEVICE: ICD-10-CM

## 2024-08-12 DIAGNOSIS — Z95.811 HEART REPLACED BY HEART ASSIST DEVICE: ICD-10-CM

## 2024-08-12 LAB
ALBUMIN SERPL BCP-MCNC: 2.2 G/DL (ref 3.5–5.2)
ALP SERPL-CCNC: 150 U/L (ref 55–135)
ALT SERPL W/O P-5'-P-CCNC: 19 U/L (ref 10–44)
ANION GAP SERPL CALC-SCNC: 7 MMOL/L (ref 8–16)
AST SERPL-CCNC: 27 U/L (ref 10–40)
BASOPHILS # BLD AUTO: 0.06 K/UL (ref 0–0.2)
BASOPHILS NFR BLD: 0.7 % (ref 0–1.9)
BILIRUB DIRECT SERPL-MCNC: 0.2 MG/DL (ref 0.1–0.3)
BILIRUB SERPL-MCNC: 0.3 MG/DL (ref 0.1–1)
BNP SERPL-MCNC: 772 PG/ML (ref 0–99)
BUN SERPL-MCNC: 36 MG/DL (ref 8–23)
CALCIUM SERPL-MCNC: 7.9 MG/DL (ref 8.7–10.5)
CHLORIDE SERPL-SCNC: 105 MMOL/L (ref 95–110)
CO2 SERPL-SCNC: 20 MMOL/L (ref 23–29)
CREAT SERPL-MCNC: 5.6 MG/DL (ref 0.5–1.4)
CRP SERPL-MCNC: 79.2 MG/L (ref 0–8.2)
DIFFERENTIAL METHOD BLD: ABNORMAL
EOSINOPHIL # BLD AUTO: 0.2 K/UL (ref 0–0.5)
EOSINOPHIL NFR BLD: 2 % (ref 0–8)
ERYTHROCYTE [DISTWIDTH] IN BLOOD BY AUTOMATED COUNT: 17.7 % (ref 11.5–14.5)
EST. GFR  (NO RACE VARIABLE): 11 ML/MIN/1.73 M^2
GLUCOSE SERPL-MCNC: 159 MG/DL (ref 70–110)
HCT VFR BLD AUTO: 26.3 % (ref 40–54)
HGB BLD-MCNC: 8.1 G/DL (ref 14–18)
IMM GRANULOCYTES # BLD AUTO: 0.07 K/UL (ref 0–0.04)
IMM GRANULOCYTES NFR BLD AUTO: 0.8 % (ref 0–0.5)
LDH SERPL L TO P-CCNC: 284 U/L (ref 110–260)
LYMPHOCYTES # BLD AUTO: 0.8 K/UL (ref 1–4.8)
LYMPHOCYTES NFR BLD: 8.8 % (ref 18–48)
MAGNESIUM SERPL-MCNC: 1.7 MG/DL (ref 1.6–2.6)
MCH RBC QN AUTO: 27.1 PG (ref 27–31)
MCHC RBC AUTO-ENTMCNC: 30.8 G/DL (ref 32–36)
MCV RBC AUTO: 88 FL (ref 82–98)
MONOCYTES # BLD AUTO: 0.6 K/UL (ref 0.3–1)
MONOCYTES NFR BLD: 7 % (ref 4–15)
NEUTROPHILS # BLD AUTO: 6.9 K/UL (ref 1.8–7.7)
NEUTROPHILS NFR BLD: 80.7 % (ref 38–73)
NRBC BLD-RTO: 0 /100 WBC
PHOSPHATE SERPL-MCNC: 3.2 MG/DL (ref 2.7–4.5)
PLATELET # BLD AUTO: 414 K/UL (ref 150–450)
PMV BLD AUTO: 9.6 FL (ref 9.2–12.9)
POTASSIUM SERPL-SCNC: 3.1 MMOL/L (ref 3.5–5.1)
PROT SERPL-MCNC: 6.6 G/DL (ref 6–8.4)
RBC # BLD AUTO: 2.99 M/UL (ref 4.6–6.2)
SODIUM SERPL-SCNC: 132 MMOL/L (ref 136–145)
WBC # BLD AUTO: 8.53 K/UL (ref 3.9–12.7)

## 2024-08-12 PROCEDURE — 86140 C-REACTIVE PROTEIN: CPT | Performed by: INTERNAL MEDICINE

## 2024-08-12 PROCEDURE — 83615 LACTATE (LD) (LDH) ENZYME: CPT | Performed by: INTERNAL MEDICINE

## 2024-08-12 PROCEDURE — 84134 ASSAY OF PREALBUMIN: CPT | Performed by: INTERNAL MEDICINE

## 2024-08-12 PROCEDURE — 83880 ASSAY OF NATRIURETIC PEPTIDE: CPT | Performed by: INTERNAL MEDICINE

## 2024-08-12 PROCEDURE — 87040 BLOOD CULTURE FOR BACTERIA: CPT | Performed by: INTERNAL MEDICINE

## 2024-08-12 PROCEDURE — 83735 ASSAY OF MAGNESIUM: CPT | Performed by: INTERNAL MEDICINE

## 2024-08-12 PROCEDURE — 82248 BILIRUBIN DIRECT: CPT | Performed by: INTERNAL MEDICINE

## 2024-08-12 PROCEDURE — 85025 COMPLETE CBC W/AUTO DIFF WBC: CPT | Performed by: INTERNAL MEDICINE

## 2024-08-12 PROCEDURE — 80053 COMPREHEN METABOLIC PANEL: CPT | Performed by: INTERNAL MEDICINE

## 2024-08-12 PROCEDURE — 84100 ASSAY OF PHOSPHORUS: CPT | Performed by: INTERNAL MEDICINE

## 2024-08-12 PROCEDURE — 36415 COLL VENOUS BLD VENIPUNCTURE: CPT | Performed by: INTERNAL MEDICINE

## 2024-08-13 DIAGNOSIS — R78.81 BACTEREMIA: Primary | ICD-10-CM

## 2024-08-13 LAB — PREALB SERPL-MCNC: 13 MG/DL (ref 20–43)

## 2024-08-14 ENCOUNTER — LAB VISIT (OUTPATIENT)
Dept: LAB | Facility: HOSPITAL | Age: 64
End: 2024-08-14
Attending: INTERNAL MEDICINE
Payer: MEDICAID

## 2024-08-14 ENCOUNTER — ANTI-COAG VISIT (OUTPATIENT)
Dept: CARDIOLOGY | Facility: CLINIC | Age: 64
End: 2024-08-14
Payer: MEDICAID

## 2024-08-14 DIAGNOSIS — Z95.811 LVAD (LEFT VENTRICULAR ASSIST DEVICE) PRESENT: Primary | ICD-10-CM

## 2024-08-14 DIAGNOSIS — Z95.811 PRESENCE OF VENTRICULAR ASSIST DEVICE: ICD-10-CM

## 2024-08-14 LAB
INR PPP: 2.6 (ref 0.8–1.2)
PROTHROMBIN TIME: 27.3 SEC (ref 9–12.5)

## 2024-08-14 PROCEDURE — 36415 COLL VENOUS BLD VENIPUNCTURE: CPT | Performed by: INTERNAL MEDICINE

## 2024-08-14 PROCEDURE — 85610 PROTHROMBIN TIME: CPT | Performed by: INTERNAL MEDICINE

## 2024-08-14 PROCEDURE — 93793 ANTICOAG MGMT PT WARFARIN: CPT | Mod: ,,,

## 2024-08-14 NOTE — PROGRESS NOTES
[facility]    2024 11:09 AM    Assessment/Plan:    Patient presents today with therapeutic INR.    Assessment of patient findings and chart review: no significant findings     Recommendation for patient's warfarin regimen: Continue current maintenance dose    Recommend repeat INR Monday  _________________________________________________________________    Radha Abbott (64 y.o.) is followed by the "[facility]" Anticoagulation Management Program.    Anticoagulation Summary  As of 2024      INR goal:  2.0-3.0   TTR:  51.0% (3.6 mo)   INR used for dosin.6 (2024)   Warfarin maintenance plan:  2.5 mg (2.5 mg x 1) every day   Weekly warfarin total:  17.5 mg   Plan last modified:  Jesenia Jensen, PharmD (2024)   Next INR check:  2024   Target end date:      Indications    LVAD (left ventricular assist device) present [Z95.811]                 Anticoagulation Episode Summary       INR check location:      Preferred lab:      Send INR reminders to:  [facility] COUMADIN LVAD    Comments:  [facility]  ( [phone], fax [phone])  // [facility]          Anticoagulation Care Providers       Provider Role Specialty Phone number    Sajna Hurley MD [facility] [phone]

## 2024-08-16 ENCOUNTER — TELEPHONE (OUTPATIENT)
Dept: TRANSPLANT | Facility: CLINIC | Age: 64
End: 2024-08-16
Payer: MEDICAID

## 2024-08-16 NOTE — TELEPHONE ENCOUNTER
Reviewed with MD Mcintyre who order patient can restart bumex 2mg daily and repeat labs Monday. Once labs are repeated, it will determine if patient should stay on regiment or not and a prescription will be placed.

## 2024-08-16 NOTE — TELEPHONE ENCOUNTER
Arlyn called to report weight was 170 (8/10) and is 177 today. He has swelling around his face and legs, but no SOB. Arlyn said they are removing 1.9 L per HD session.  He is urinating a little twice a day. She is asking if he can take bumex again.

## 2024-08-17 LAB
BACTERIA BLD CULT: NORMAL

## 2024-08-19 ENCOUNTER — ANTI-COAG VISIT (OUTPATIENT)
Dept: CARDIOLOGY | Facility: CLINIC | Age: 64
End: 2024-08-19
Payer: MEDICAID

## 2024-08-19 ENCOUNTER — LAB VISIT (OUTPATIENT)
Dept: LAB | Facility: HOSPITAL | Age: 64
End: 2024-08-19
Attending: INTERNAL MEDICINE
Payer: MEDICAID

## 2024-08-19 ENCOUNTER — TELEPHONE (OUTPATIENT)
Dept: TRANSPLANT | Facility: CLINIC | Age: 64
End: 2024-08-19
Payer: MEDICAID

## 2024-08-19 DIAGNOSIS — Z95.811 HEART REPLACED BY HEART ASSIST DEVICE: Primary | ICD-10-CM

## 2024-08-19 DIAGNOSIS — Z95.811 LVAD (LEFT VENTRICULAR ASSIST DEVICE) PRESENT: Primary | ICD-10-CM

## 2024-08-19 LAB
ALBUMIN SERPL BCP-MCNC: 2.3 G/DL (ref 3.5–5.2)
ALP SERPL-CCNC: 135 U/L (ref 55–135)
ALT SERPL W/O P-5'-P-CCNC: 17 U/L (ref 10–44)
ANION GAP SERPL CALC-SCNC: 12 MMOL/L (ref 8–16)
AST SERPL-CCNC: 21 U/L (ref 10–40)
BILIRUB SERPL-MCNC: 0.4 MG/DL (ref 0.1–1)
BNP SERPL-MCNC: 1816 PG/ML (ref 0–99)
BUN SERPL-MCNC: 39 MG/DL (ref 8–23)
CALCIUM SERPL-MCNC: 8.1 MG/DL (ref 8.7–10.5)
CHLORIDE SERPL-SCNC: 107 MMOL/L (ref 95–110)
CO2 SERPL-SCNC: 20 MMOL/L (ref 23–29)
CREAT SERPL-MCNC: 4.5 MG/DL (ref 0.5–1.4)
EST. GFR  (NO RACE VARIABLE): 14 ML/MIN/1.73 M^2
GLUCOSE SERPL-MCNC: 113 MG/DL (ref 70–110)
INR PPP: 2.3 (ref 0.8–1.2)
POTASSIUM SERPL-SCNC: 4.6 MMOL/L (ref 3.5–5.1)
PROT SERPL-MCNC: 6.6 G/DL (ref 6–8.4)
PROTHROMBIN TIME: 25.2 SEC (ref 9–12.5)
SODIUM SERPL-SCNC: 139 MMOL/L (ref 136–145)

## 2024-08-19 PROCEDURE — 80053 COMPREHEN METABOLIC PANEL: CPT | Performed by: INTERNAL MEDICINE

## 2024-08-19 PROCEDURE — 85610 PROTHROMBIN TIME: CPT | Performed by: INTERNAL MEDICINE

## 2024-08-19 PROCEDURE — 93793 ANTICOAG MGMT PT WARFARIN: CPT | Mod: ,,,

## 2024-08-19 PROCEDURE — 36415 COLL VENOUS BLD VENIPUNCTURE: CPT | Performed by: INTERNAL MEDICINE

## 2024-08-19 PROCEDURE — 83880 ASSAY OF NATRIURETIC PEPTIDE: CPT | Performed by: INTERNAL MEDICINE

## 2024-08-19 NOTE — PROGRESS NOTES
[facility]    2024 3:56 PM    Assessment/Plan:    Patient presents today with therapeutic INR.    Assessment of patient findings and chart review: no significant findings     Recommendation for patient's warfarin regimen: Continue current maintenance dose    Recommend repeat INR in 1 week  _________________________________________________________________    Radha Abbott (64 y.o.) is followed by the [facility].    Anticoagulation Summary  As of 2024      INR goal:  2.0-3.0   TTR:  53.2% (3.8 mo)   INR used for dosin.3 (2024)   Warfarin maintenance plan:  2.5 mg (2.5 mg x 1) every day   Weekly warfarin total:  17.5 mg   Plan last modified:  Jesenia Jensen, PharmD (2024)   Next INR check:  2024   Target end date:      Indications    LVAD (left ventricular assist device) present [Z95.811]                 Anticoagulation Episode Summary       INR check location:      Preferred lab:      Send INR reminders to:  [facility] COUMADIN LVAD    Comments:  [facility]  ( [phone], fax [phone])  // [facility]          Anticoagulation Care Providers       Provider Role Specialty Phone number    Sajan Hurley MD [facility] Cardiology [phone]

## 2024-08-19 NOTE — TELEPHONE ENCOUNTER
Attempting to f/u after bumex restarted last week to assess if patient has lost weight and/or had improvement in symptoms. Left message for call back.    Spouse called back, reports no changes in weight and no increase in urination. Will discuss with MD.

## 2024-08-20 ENCOUNTER — TELEPHONE (OUTPATIENT)
Dept: TRANSPLANT | Facility: CLINIC | Age: 64
End: 2024-08-20
Payer: MEDICAID

## 2024-08-20 NOTE — TELEPHONE ENCOUNTER
VAD Contact: Called Spouse regarding equipment maintenance due at upcoming clinic appointment on 08/29/2024.  Requested Spouse to bring Mobile Power Unit (MPU) and Emergency Bag (2 batteries, 2 clips, 1 backup controller) with them to next appointment for maintenance.  Spouse verbalized understanding and agreement.    It is medically necessary to ensure patient has properly functioning equipment and wearables to prevent infection, injury or death to patient.

## 2024-08-20 NOTE — TELEPHONE ENCOUNTER
Discussed with Dr Mcintyre. Will stop bumex. HD may need to remove more fluid. Left message for patient for call back.

## 2024-08-21 ENCOUNTER — DOCUMENT SCAN (OUTPATIENT)
Dept: HOME HEALTH SERVICES | Facility: HOSPITAL | Age: 64
End: 2024-08-21
Payer: MEDICAID

## 2024-08-21 ENCOUNTER — DOCUMENTATION ONLY (OUTPATIENT)
Dept: CARDIOTHORACIC SURGERY | Facility: CLINIC | Age: 64
End: 2024-08-21
Payer: MEDICAID

## 2024-08-21 NOTE — PROGRESS NOTES
I was unable to see pt in LVAD clinic for his 6 month follow up visits on 4/11/24 and 5/23/24 for INTERMACS, due to a scheduling conflict.    Planned to see pt in LVAD clinic for 6 month follow up for INTERMACS survey on 4/25/24, 6/20/24, and 7/25/24.  Pt not seen / survey not completed during this window due to pt cancelling appts.    As pt's next LVAD clinic appt was not scheduled within pt's 6 month window for INTERMACS, his 6 month survey for INTERMACS was not scheduled / performed.

## 2024-08-26 ENCOUNTER — ANTI-COAG VISIT (OUTPATIENT)
Dept: CARDIOLOGY | Facility: CLINIC | Age: 64
End: 2024-08-26
Payer: MEDICAID

## 2024-08-26 ENCOUNTER — TELEPHONE (OUTPATIENT)
Dept: TRANSPLANT | Facility: CLINIC | Age: 64
End: 2024-08-26
Payer: MEDICAID

## 2024-08-26 ENCOUNTER — LAB VISIT (OUTPATIENT)
Dept: LAB | Facility: HOSPITAL | Age: 64
End: 2024-08-26
Attending: INTERNAL MEDICINE
Payer: MEDICAID

## 2024-08-26 DIAGNOSIS — Z95.811 LVAD (LEFT VENTRICULAR ASSIST DEVICE) PRESENT: Primary | ICD-10-CM

## 2024-08-26 DIAGNOSIS — Z95.811 HEART REPLACED BY HEART ASSIST DEVICE: Primary | ICD-10-CM

## 2024-08-26 LAB
BASOPHILS # BLD AUTO: 0.05 K/UL (ref 0–0.2)
BASOPHILS NFR BLD: 0.7 % (ref 0–1.9)
BNP SERPL-MCNC: 789 PG/ML (ref 0–99)
DIFFERENTIAL METHOD BLD: ABNORMAL
EOSINOPHIL # BLD AUTO: 0.2 K/UL (ref 0–0.5)
EOSINOPHIL NFR BLD: 2.4 % (ref 0–8)
ERYTHROCYTE [DISTWIDTH] IN BLOOD BY AUTOMATED COUNT: 17.7 % (ref 11.5–14.5)
HCT VFR BLD AUTO: 24.2 % (ref 40–54)
HGB BLD-MCNC: 7.4 G/DL (ref 14–18)
IMM GRANULOCYTES # BLD AUTO: 0.1 K/UL (ref 0–0.04)
IMM GRANULOCYTES NFR BLD AUTO: 1.5 % (ref 0–0.5)
INR PPP: 2.6 (ref 0.8–1.2)
LYMPHOCYTES # BLD AUTO: 0.8 K/UL (ref 1–4.8)
LYMPHOCYTES NFR BLD: 12 % (ref 18–48)
MAGNESIUM SERPL-MCNC: 1.6 MG/DL (ref 1.6–2.6)
MCH RBC QN AUTO: 26.7 PG (ref 27–31)
MCHC RBC AUTO-ENTMCNC: 30.6 G/DL (ref 32–36)
MCV RBC AUTO: 87 FL (ref 82–98)
MONOCYTES # BLD AUTO: 0.5 K/UL (ref 0.3–1)
MONOCYTES NFR BLD: 7.4 % (ref 4–15)
NEUTROPHILS # BLD AUTO: 5.1 K/UL (ref 1.8–7.7)
NEUTROPHILS NFR BLD: 76 % (ref 38–73)
NRBC BLD-RTO: 0 /100 WBC
PLATELET # BLD AUTO: 389 K/UL (ref 150–450)
PMV BLD AUTO: 9.5 FL (ref 9.2–12.9)
PROTHROMBIN TIME: 28.2 SEC (ref 9–12.5)
RBC # BLD AUTO: 2.77 M/UL (ref 4.6–6.2)
WBC # BLD AUTO: 6.76 K/UL (ref 3.9–12.7)

## 2024-08-26 PROCEDURE — 83880 ASSAY OF NATRIURETIC PEPTIDE: CPT | Performed by: INTERNAL MEDICINE

## 2024-08-26 PROCEDURE — 36415 COLL VENOUS BLD VENIPUNCTURE: CPT | Performed by: INTERNAL MEDICINE

## 2024-08-26 PROCEDURE — 93793 ANTICOAG MGMT PT WARFARIN: CPT | Mod: ,,,

## 2024-08-26 PROCEDURE — 83735 ASSAY OF MAGNESIUM: CPT | Performed by: INTERNAL MEDICINE

## 2024-08-26 PROCEDURE — 85025 COMPLETE CBC W/AUTO DIFF WBC: CPT | Performed by: INTERNAL MEDICINE

## 2024-08-26 PROCEDURE — 85610 PROTHROMBIN TIME: CPT | Performed by: INTERNAL MEDICINE

## 2024-08-26 NOTE — PROGRESS NOTES
[facility]    2024 1:20 PM    Assessment/Plan:    Patient presents today with therapeutic INR.    Assessment of patient findings and chart review: no significant findings     Recommendation for patient's warfarin regimen: Continue current maintenance dose    Recommend repeat INR Thursday  _________________________________________________________________    Radha Abbott (64 y.o.) is followed by the [facility].    Anticoagulation Summary  As of 2024      INR goal:  2.0-3.0   TTR:  55.9% (4 mo)   INR used for dosin.6 (2024)   Warfarin maintenance plan:  2.5 mg (2.5 mg x 1) every day   Weekly warfarin total:  17.5 mg   Plan last modified:  Jesenia Jensen, PharmD (2024)   Next INR check:  2024   Target end date:      Indications    LVAD (left ventricular assist device) present [Z95.811]                 Anticoagulation Episode Summary       INR check location:      Preferred lab:      Send INR reminders to:  [facility] COUMADIN LVAD    Comments:  [facility]  ( [phone], fax [phone])  // [employer]          Anticoagulation Care Providers       Provider Role Specialty Phone number    Sajan Hurley MD [facility] Card[facility] [phone]

## 2024-08-27 ENCOUNTER — PATIENT MESSAGE (OUTPATIENT)
Dept: CARDIOTHORACIC SURGERY | Facility: CLINIC | Age: 64
End: 2024-08-27
Payer: MEDICAID

## 2024-08-29 ENCOUNTER — OFFICE VISIT (OUTPATIENT)
Dept: INFECTIOUS DISEASES | Facility: CLINIC | Age: 64
End: 2024-08-29
Payer: MEDICAID

## 2024-08-29 ENCOUNTER — OFFICE VISIT (OUTPATIENT)
Dept: TRANSPLANT | Facility: CLINIC | Age: 64
End: 2024-08-29
Payer: MEDICAID

## 2024-08-29 ENCOUNTER — ANTI-COAG VISIT (OUTPATIENT)
Dept: CARDIOLOGY | Facility: CLINIC | Age: 64
End: 2024-08-29
Payer: MEDICAID

## 2024-08-29 ENCOUNTER — CLINICAL SUPPORT (OUTPATIENT)
Dept: TRANSPLANT | Facility: CLINIC | Age: 64
End: 2024-08-29
Payer: MEDICAID

## 2024-08-29 VITALS — TEMPERATURE: 98 F | SYSTOLIC BLOOD PRESSURE: 78 MMHG | BODY MASS INDEX: 22.53 KG/M2 | WEIGHT: 170 LBS | HEIGHT: 73 IN

## 2024-08-29 VITALS
WEIGHT: 177.69 LBS | DIASTOLIC BLOOD PRESSURE: 53 MMHG | TEMPERATURE: 98 F | SYSTOLIC BLOOD PRESSURE: 110 MMHG | HEIGHT: 73 IN | BODY MASS INDEX: 23.55 KG/M2 | HEART RATE: 83 BPM

## 2024-08-29 DIAGNOSIS — R78.81 BACTEREMIA DUE TO ENTEROCOCCUS: ICD-10-CM

## 2024-08-29 DIAGNOSIS — I50.84 END STAGE HEART FAILURE: ICD-10-CM

## 2024-08-29 DIAGNOSIS — I48.0 PAF (PAROXYSMAL ATRIAL FIBRILLATION): ICD-10-CM

## 2024-08-29 DIAGNOSIS — I47.20 VENTRICULAR TACHYCARDIA: ICD-10-CM

## 2024-08-29 DIAGNOSIS — N18.6 CKD (CHRONIC KIDNEY DISEASE) STAGE V REQUIRING CHRONIC DIALYSIS: ICD-10-CM

## 2024-08-29 DIAGNOSIS — Z95.811 PRESENCE OF VENTRICULAR ASSIST DEVICE: Primary | ICD-10-CM

## 2024-08-29 DIAGNOSIS — E11.9 TYPE 2 DIABETES MELLITUS WITHOUT COMPLICATION, WITHOUT LONG-TERM CURRENT USE OF INSULIN: ICD-10-CM

## 2024-08-29 DIAGNOSIS — Z95.811 LVAD (LEFT VENTRICULAR ASSIST DEVICE) PRESENT: Primary | ICD-10-CM

## 2024-08-29 DIAGNOSIS — K21.9 GASTROESOPHAGEAL REFLUX DISEASE, UNSPECIFIED WHETHER ESOPHAGITIS PRESENT: ICD-10-CM

## 2024-08-29 DIAGNOSIS — B95.2 BACTEREMIA DUE TO ENTEROCOCCUS: ICD-10-CM

## 2024-08-29 DIAGNOSIS — I50.42 CHRONIC COMBINED SYSTOLIC AND DIASTOLIC HEART FAILURE: ICD-10-CM

## 2024-08-29 DIAGNOSIS — Z95.811 LVAD (LEFT VENTRICULAR ASSIST DEVICE) PRESENT: ICD-10-CM

## 2024-08-29 DIAGNOSIS — Z95.811 HEART REPLACED: Primary | ICD-10-CM

## 2024-08-29 DIAGNOSIS — I25.10 CORONARY ARTERY DISEASE INVOLVING NATIVE CORONARY ARTERY OF NATIVE HEART WITHOUT ANGINA PECTORIS: ICD-10-CM

## 2024-08-29 DIAGNOSIS — A49.8 CLOSTRIDIUM DIFFICILE INFECTION: Primary | ICD-10-CM

## 2024-08-29 DIAGNOSIS — Z99.2 CKD (CHRONIC KIDNEY DISEASE) STAGE V REQUIRING CHRONIC DIALYSIS: ICD-10-CM

## 2024-08-29 PROCEDURE — 3008F BODY MASS INDEX DOCD: CPT | Mod: CPTII,,, | Performed by: INTERNAL MEDICINE

## 2024-08-29 PROCEDURE — 3066F NEPHROPATHY DOC TX: CPT | Mod: CPTII,,, | Performed by: INTERNAL MEDICINE

## 2024-08-29 PROCEDURE — 99999 PR PBB SHADOW E&M-EST. PATIENT-LVL III: CPT | Mod: PBBFAC,,, | Performed by: STUDENT IN AN ORGANIZED HEALTH CARE EDUCATION/TRAINING PROGRAM

## 2024-08-29 PROCEDURE — 99999 PR PBB SHADOW E&M-EST. PATIENT-LVL III: CPT | Mod: PBBFAC,,, | Performed by: INTERNAL MEDICINE

## 2024-08-29 PROCEDURE — 99213 OFFICE O/P EST LOW 20 MIN: CPT | Mod: PBBFAC | Performed by: INTERNAL MEDICINE

## 2024-08-29 PROCEDURE — 99213 OFFICE O/P EST LOW 20 MIN: CPT | Mod: PBBFAC,27 | Performed by: STUDENT IN AN ORGANIZED HEALTH CARE EDUCATION/TRAINING PROGRAM

## 2024-08-29 PROCEDURE — 3044F HG A1C LEVEL LT 7.0%: CPT | Mod: CPTII,,, | Performed by: INTERNAL MEDICINE

## 2024-08-29 PROCEDURE — 1111F DSCHRG MED/CURRENT MED MERGE: CPT | Mod: CPTII,,, | Performed by: INTERNAL MEDICINE

## 2024-08-29 PROCEDURE — 99215 OFFICE O/P EST HI 40 MIN: CPT | Mod: S$PBB,,, | Performed by: INTERNAL MEDICINE

## 2024-08-29 RX ORDER — PANTOPRAZOLE SODIUM 40 MG/1
40 TABLET, DELAYED RELEASE ORAL DAILY
Qty: 90 TABLET | Refills: 3 | Status: SHIPPED | OUTPATIENT
Start: 2024-08-29 | End: 2025-08-29

## 2024-08-29 RX ORDER — ATORVASTATIN CALCIUM 40 MG/1
40 TABLET, FILM COATED ORAL NIGHTLY
Qty: 30 TABLET | Refills: 11 | Status: SHIPPED | OUTPATIENT
Start: 2024-08-29 | End: 2025-08-29

## 2024-08-29 NOTE — PROGRESS NOTES
"Date of Implant with Heartmate 3 LVAD: 12/1/2023    PATIENT ARRIVED IN CLINIC:  wheelchair  Accompanied by: wife  Complaints/reason for visit today: routine    Vitals  Temperature, oral:   Temp Readings from Last 1 Encounters:   08/29/24 98.4 °F (36.9 °C)     Blood Pressure:   BP Readings from Last 3 Encounters:   08/29/24 (!) 78/0   08/07/24 (!) 98/56   08/01/24 (!) 97/58        VAD Interrogation:      8/7/2024     4:00 PM 8/7/2024    12:13 PM 8/7/2024     7:16 AM   TXP PINA INTERROGATIONS   Pulsatility Intermittent pulse Intermittent pulse Intermittent pulse     HCT:   Lab Results   Component Value Date    HCT 27.9 (L) 08/29/2024    HCT 39 07/23/2024       VAD Assessment  Problems / Issues /Equipment Needs/ Alarms with VAD if any: None noted  VAD Sounds: HM3   VAD Binder With Patient: Yes  Reviewed VAD Numbers In Binder: Yes  Emergency Equipment With Patient: Yes   Equipment Needs: Yes. Preventative maintenance completed on U [id number] per FAMILIA Zavala  It is medically necessary to ensure patient has properly functioning equipment and wearables to prevent infection, injury or death to patient.     DLES Assessment and Dressing Care:  Appearance of DLES: "1"  Antibiotics: NO  Velour: No  Manual & Visual Inspection Of Driveline: No kinks or tears noted  Stabilization Device In Use: yes, goodman securement device    Heartmate 3 Module Cable:  No yellow exposed and Attempted to unscrew modular cable to ensure it will be able to come lose in the event we ever need to change the modular cable while patient held the driveline in place so it would not move. Modular cable connection able to be unscrewed and re-tightened. Instructed pt to perform this weekly.  Frequency of Dressing Changes: weekly & silverlon kit   Pt In Need Of Management Kits?:no. Arlyn is in need of sterile saline and gauze to activate silver.   It is medically necessary to have VAD management kits in order to prevent infection or to assist in the healing of an " infected DLES.    Assessment/ Quality of Life Survery:   Complaints Of Nausea / Vomiting: None noted    Appearance and Frequency Of Stools: normal and formed without blood & daily  Color Of Urine: clear/yellow  Pain: NO  Coping/Depression/Anxiety: coping okay  Sleep Habits: 8-9 hrs /night  Sleep Aids:  Trazadone 50 mg nightly  Showering: No  Self- Care I have no problems with self- care  Mobility I have some problems in walking about  Usual Activities I have some problems with performing my usual activities  Activity/Exercise: Physical therapy at home T/TH.  Only walking with therapy one day per week.  Arlyn walks him on weekends when she is off work.    Driving: No.    Labs:    Chemistry        Component Value Date/Time     08/29/2024 0920    K 3.8 08/29/2024 0920     08/29/2024 0920    CO2 21 (L) 08/29/2024 0920    BUN 18 08/29/2024 0920    CREATININE 3.5 (H) 08/29/2024 0920     (H) 08/29/2024 0920        Component Value Date/Time    CALCIUM 8.3 (L) 08/29/2024 0920    ALKPHOS 135 08/29/2024 0920    AST 16 08/29/2024 0920    ALT 17 08/29/2024 0920    BILITOT 0.3 08/29/2024 0920    ESTGFRAFRICA >60.0 02/07/2018 0935    EGFRNONAA >60.0 02/07/2018 0935            Magnesium   Date Value Ref Range Status   08/29/2024 1.8 1.6 - 2.6 mg/dL Final       Lab Results   Component Value Date    WBC 8.26 08/29/2024    HGB 7.9 (L) 08/29/2024    HCT 27.9 (L) 08/29/2024    MCV 92 08/29/2024     08/29/2024       Lab Results   Component Value Date    INR 2.4 (H) 08/29/2024    INR 2.6 (H) 08/26/2024    INR 2.3 (H) 08/19/2024       BNP   Date Value Ref Range Status   08/29/2024 1,138 (H) 0 - 99 pg/mL Final     Comment:     Values of less than 100 pg/ml are consistent with non-CHF populations.   08/26/2024 789 (H) 0 - 99 pg/mL Final     Comment:     Values of less than 100 pg/ml are consistent with non-CHF populations.   08/19/2024 1,816 (H) 0 - 99 pg/mL Final     Comment:     Values of less than 100 pg/ml are  consistent with non-CHF populations.       LD   Date Value Ref Range Status   08/29/2024 303 (H) 110 - 260 U/L Final     Comment:     Results are increased in hemolyzed samples.   08/12/2024 284 (H) 110 - 260 U/L Final     Comment:     Results are increased in hemolyzed samples.   08/07/2024 222 110 - 260 U/L Final     Comment:     Results are increased in hemolyzed samples.       Labs reviewed with patient: YES     Medication Reconciliation: per MA.  New Medication Detail Provided: yes  Coumadin Managed by: [facility]    Education: Reviewed driveline care, emergency procedures, how to change the controller, alarms with patient, as well as discussed how to page the VAD coordinator in case of an emergency.   Educated patient/family that chest compressions are allowed in the event they are needed.    Reminded patient/caregiver not to touch their face and to cover their mouth when they cough or sneeze.   Vaccines: Pt informed that we are encouraging all VAD patients to receive  vaccines and boosters. Informed pt that they can take tylenol but should avoid other NSAIDs.       Plans/Needs: Walk more using walker. I walked pt out to waiting room and he was slightly unsteady on his feet. He said his knees wanted to give out on him.  HD is removing 2-2.5 Liters at each  visit now, much improved. Right CW catheter with occlusive dressing CDI. I did ask pt to become more involved with learning his medications going forward. Pt verbalized he will try. No changes today. RTC in 1 month.      Hurricane Season: Yes, discussed with patient: With hurricane season approaching, we want to make sure you are fully prepared for any emergency.  Should the National Weather Service or your local authorities recommend a voluntary or mandatory evacuation of your area, The VAD team requires you to evacuate to a safe place.  Remember, when it is a mandatory evacuation, traffic will become an issue for your limited battery power.   Therefore, we strongly urge you to evacuate early.      The VAD team advises you to have the following in place before hurricane season:  Have an evacuation plan in place including places to evacuate, names and phone numbers.  This information is required to be given to the VAD coordinator.  Have your VAD emergency contact numbers with you.  Make sure your prescriptions will not run out by the end of September.  Make sure you have enough medications, including pills, inhalers, patches,     etc. to take, should you be gone for more than 2 weeks.  Make sure ALL of your batteries are fully charged.  Bring enough dressing change supplies to last for at least 2 weeks.  Bring your VAD binder with you.  Make sure your binder is updated and complete with alarms reference card, patient hand book, emergency contact numbers, daily log sheets, etc.    If you do not have family or friends as an evacuation destination, we recommend evacuating to a safe area.   Do NOT evacuate to [facility].  The VAD team wants to stress the importance of planning for your evacuation in the event of a hurricane.  If you have any LVAD questions or issues, please contact the LVAD coordinator.

## 2024-08-29 NOTE — PROCEDURES
8/29/2024    11:07 AM 8/7/2024     4:00 PM 8/7/2024    12:13 PM 8/7/2024     7:16 AM 8/7/2024     3:19 AM 8/7/2024    12:57 AM 8/6/2024     8:51 PM   TXP PINA INTERROGATIONS   Type HeartMate3         Flow 4         Speed 5000         PI 4         Power ([place]) 3.4         LSL 4600         Pulsatility No Pulse Intermittent pulse Intermittent pulse Intermittent pulse Pulse Intermittent pulse Intermittent pulse   }

## 2024-08-29 NOTE — PROGRESS NOTES
"Subjective:   Patient ID:  Radha Abbott is a 64 y.o. male who presents for LVAD followup visit.       Implant Date:12/1/23     Heartmate 3 RPM 5000     INR goal: 2-3   Bridge with Heparin   Antiplatelets: None d/t low H/H on implant         8/29/2024    11:07 AM 8/7/2024     4:00 PM 8/7/2024    12:13 PM 8/7/2024     7:16 AM 8/7/2024     3:19 AM 8/7/2024    12:57 AM 8/6/2024     8:51 PM   TXP PINA INTERROGATIONS   Type HeartMate3         Flow 4         Speed 5000         PI 4         Power ([place]rington) 3.4         LSL 4600         Pulsatility No Pulse Intermittent pulse Intermittent pulse Intermittent pulse Pulse Intermittent pulse Intermittent pulse       HPI  Mr. Radha Abbott is a 65 yo male with stage D HFrEF, ICMP  Who underwent HM3 placement placed by Dr Washington on 12/1/23. Lengthy post op course complicated by vasoplegia(requiring Giaprezza), debility, malnutrition/impaired swallowing, and renal failure requiring HD (since weaned off). Once medically stable, he was transferred to Rehab for further PT/OT/ST. He had done well for a hile but then had several clinical events including; worsening renal failure and  COVID/pneumonia. Was discharged and readmitted again for C diff for which he was successfully treated. He is on HD now on MWF (3 hours sessions). He comes today for a follow-up visit. Today comes for a follow-up visit. Has no complaints.  VAD speed is at 5000 rpm. No VAD alarms noted on interrogation occasional PI events . BP is 78 mm of Hg. DLES is a "1". INR is supratherapeutic at 2.4. LDH is at baseline.      Review of Systems   Constitutional: Negative. Negative for chills, decreased appetite, diaphoresis, fever, malaise/fatigue, night sweats, weight gain and weight loss.   Eyes: Negative.    Cardiovascular:  Negative for chest pain, claudication, cyanosis, dyspnea on exertion, irregular heartbeat, leg swelling, near-syncope, orthopnea, palpitations, paroxysmal nocturnal dyspnea and syncope.   Respiratory:  " "Negative for cough, hemoptysis and shortness of breath.    Endocrine: Negative.    Hematologic/Lymphatic: Negative.    Skin:  Negative for color change, dry skin and nail changes.   Musculoskeletal: Negative.    Gastrointestinal: Negative.    Genitourinary: Negative.    Neurological:  Negative for weakness.       Objective:   Blood pressure (!) 78/0, temperature 98.4 °F (36.9 °C), height 6' 1" (1.854 m), weight 77.1 kg (170 lb).body mass index is 22.43 kg/m².    Doppler: 78    Physical Exam  Vitals reviewed.   Constitutional:       Appearance: He is well-developed.      Comments: BP (!) 78/0 (BP Location: Right arm, Patient Position: Sitting) Comment (BP Method): doppler  Temp 98.4 °F (36.9 °C)   Ht 6' 1" (1.854 m)   Wt 77.1 kg (170 lb)   BMI 22.43 kg/m²      HENT:      Head: Normocephalic.   Neck:      Vascular: No carotid bruit or JVD.   Cardiovascular:      Heart sounds: No murmur heard.  Pulmonary:      Effort: Pulmonary effort is normal.      Breath sounds: Normal breath sounds.   Abdominal:      General: Bowel sounds are normal.      Palpations: Abdomen is soft.   Skin:     General: Skin is warm.   Neurological:      Mental Status: He is alert.         Lab Results   Component Value Date    WBC 8.26 08/29/2024    HGB 7.9 (L) 08/29/2024    HCT 27.9 (L) 08/29/2024    MCV 92 08/29/2024     08/29/2024    CO2 21 (L) 08/29/2024    CREATININE 3.5 (H) 08/29/2024    CALCIUM 8.3 (L) 08/29/2024    ALBUMIN 2.5 (L) 08/29/2024    AST 16 08/29/2024    BNP 1,138 (H) 08/29/2024    ALT 17 08/29/2024     (H) 08/29/2024       Lab Results   Component Value Date    INR 2.4 (H) 08/29/2024    INR 2.6 (H) 08/26/2024    INR 2.3 (H) 08/19/2024       BNP   Date Value Ref Range Status   08/29/2024 1,138 (H) 0 - 99 pg/mL Final     Comment:     Values of less than 100 pg/ml are consistent with non-CHF populations.   08/26/2024 789 (H) 0 - 99 pg/mL Final     Comment:     Values of less than 100 pg/ml are consistent with " non-CHF populations.   08/19/2024 1,816 (H) 0 - 99 pg/mL Final     Comment:     Values of less than 100 pg/ml are consistent with non-CHF populations.       LD   Date Value Ref Range Status   08/29/2024 303 (H) 110 - 260 U/L Final     Comment:     Results are increased in hemolyzed samples.   08/12/2024 284 (H) 110 - 260 U/L Final     Comment:     Results are increased in hemolyzed samples.   08/07/2024 222 110 - 260 U/L Final     Comment:     Results are increased in hemolyzed samples.       Assessment:      1. Presence of ventricular assist device    2. Chronic combined systolic and diastolic heart failure    3. End stage heart failure    4. Type 2 diabetes mellitus without complication, without long-term current use of insulin    5. Ventricular tachycardia    6. Coronary artery disease involving native coronary artery of native heart without angina pectoris    7. PAF (paroxysmal atrial fibrillation)    8. CKD (chronic kidney disease) stage V requiring chronic dialysis        Plan:   Patient is now NYHA class II but very weak and deconditioned.   Owing to his advanced kidney disease and being on dialysis with low BP, he is not on a  ACEI/ARB/ARNI or MRA. He is also not on a beta blocker due to RV dysfunction.   BP is controlled.  INR is supratherapeutic.   VAD interrogation was performed in clinic  Any VAD management kits dispensed today medically necessary  Recommend 2 gram sodium restriction and 1500cc fluid restriction.  Encourage physical activity with graded exercise program.  Requested patient to weigh themselves daily, and to notify us if their weight increases by more than 3 lbs in 1 day or 5 lbs in 1 week.   He will be seen by ID today  Additionally, the patient has a patient centered goal of being able to get stronger  RTC in 1 month        Listed for transplant: No. Patient inquired about candidacy for OHTx. In view of hsi clinical comorbidities and the need of Dual organ Herat and Kidney, he would  likely be declined in our center. My recommendation is for him to get stronger (very debilitated at this time) and get a second opinion.      UNOS Patient Status  Functional Status: 40% - Disabled: requires special care and assistance  Physical Capacity: No Limitations  Working for Income: No  If no, reason not working: Demands of Treatment        Advance Care Planning  Date: 05/23/2024  Patient has ACP docs in file.      Brayan Ocampo MD

## 2024-08-29 NOTE — PROGRESS NOTES
[facility]tico[facility]    2024 10:14 AM    Assessment/Plan:    Patient presents today with therapeutic INR.    Assessment of patient findings and chart review: no significant findings     Recommendation for patient's warfarin regimen: Continue current maintenance dose    Recommend repeat INR in 1 week  _________________________________________________________________    Radha Abbott (64 y.o.) is followed by the [facility] Anticoagulation Management [facility].    Anticoagulation Summary  As of 2024      INR goal:  2.0-3.0   TTR:  56.9% (4.1 mo)   INR used for dosin.4 (2024)   Warfarin maintenance plan:  2.5 mg (2.5 mg x 1) every day   Weekly warfarin total:  17.5 mg   Plan last modified:  Jesenia Jensen, PharmD (2024)   Next INR check:  2024   Target end date:      Indications    LVAD (left ventricular assist device) present [Z95.811]                 Anticoagulation Episode Summary       INR check location:      Preferred lab:      Send INR reminders to:  [facility] LVAD    Comments:  [facility]  ( [phone], fax [phone])  // [facility]          Anticoagulation Care Providers       Provider Role Specialty Phone number    Sajan Hurley MD [facility] Cardiology [phone]

## 2024-08-29 NOTE — PROGRESS NOTES
INFECTIOUS DISEASE CLINIC  08/29/2024     Subjective:      Chief Complaint:   Chief Complaint   Patient presents with    Hospital Follow Up       History of Present Illness:    This is a 64 y.o. male with LVAD 12/2023 as DT, AICD, ESRD on HD, and prior Efaecalis bacteremia with recent admission for CDAD who is referred to my clinic for hospital follow up.  Patient known to ID, please see prior notes for full details. Accompanied by spouse today. Pt reports feeling a lot better. Diarrhea resolved after completing course of PO vancomycin. No drainage from LVAD site. Getting stronger. Got blood work today - no leukocytosis noted and blcx in process.         Review of Systems   Constitutional: Negative for chills and fever.   All other systems reviewed and are negative.        Past Medical History:   Diagnosis Date    Aspiration pneumonia of both lungs 06/17/2024    CAD (coronary artery disease)     CHF (congestive heart failure)     Delirium 06/16/2024    Diabetes mellitus     HFrEF (heart failure with reduced ejection fraction)     Hypoglycemia 06/12/2024    ICD (implantable cardioverter-defibrillator) in place     MI, old     Type 2 diabetes mellitus without complication, without long-term current use of insulin 10/07/2023     Past Surgical History:   Procedure Laterality Date    ANGIOPLASTY-VENOUS ARTERY Right 12/1/2023    Procedure: ANGIOPLASTY-VENOUS ARTERY, RIGHT FEMORAL;  Surgeon: Yuri Washington MD;  Location: [facility] OR [address] [place] [address];  Service: Cardiovascular;  Laterality: Right;    AORTIC VALVULOPLASTY N/A 12/1/2023    Procedure: REPAIR, AORTIC VALVE;  Surgeon: Yuri Washington MD;  Location: [facility] OR [address] [place] [address];  Service: Cardiovascular;  Laterality: N/A;    CARDIAC SURGERY      CLOSURE N/A 12/1/2023    Procedure: CLOSURE, TEMPORARY;  Surgeon: Yuri Washington MD;  [facility];  Service: Cardiovascular;  Laterality: N/A;    DRAINAGE OF PLEURAL EFFUSION  12/4/2023    Procedure: DRAINAGE, PLEURAL EFFUSION;   Surgeon: Yuri Washington MD;  [facility];  Service: Cardiovascular;;    INSERTION OF GRAFT TO PERICARDIUM  12/4/2023    Procedure: INSERTION, GRAFT, PERICARDIUM;  Surgeon: Yuri Washington MD;  [facility];  Service: Cardiovascular;;    INSERTION OF INTRA-AORTIC BALLOON ASSIST DEVICE Right 11/21/2023    Procedure: INSERTION, INTRA-AORTIC BALLOON PUMP;  Surgeon: Finn Cohn MD;  Location: [facility] CATH LAB;  Service: Cardiology;  Laterality: Right;    LEFT VENTRICULAR ASSIST DEVICE Left 12/1/2023    Procedure: INSERTION-LEFT VENTRICULAR ASSIST DEVICE;  Surgeon: Yuri Washington MD;  [facility];  Service: Cardiovascular;  Laterality: Left;  REDO STERNOTOMY - REDO SAW NEEDED FOR CASE    LYSIS OF ADHESIONS  12/1/2023    Procedure: LYSIS, ADHESIONS;  Surgeon: Yuri Washington MD;  [facility];  Service: Cardiovascular;;    PLACEMENT OF SWAN ROLANDO CATHETER WITH IMAGING GUIDANCE  11/20/2023    Procedure: INSERTION, CATHETER, SWAN-ROLANDO, WITH IMAGING GUIDANCE;  Surgeon: Sajan Hurley MD;  Location: [facility] CATH LAB;  Service: Cardiology;;    REMOVAL OF TUNNELED CENTRAL VENOUS CATHETER (CVC) N/A 3/1/2024    Procedure: REMOVAL, CATHETER, CENTRAL VENOUS, TUNNELED;  Surgeon: Seble Aguilar MD;  Location: [facility] CATH LAB;  Service: Interventional Nephrology;  Laterality: N/A;    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 12/1/2023    Procedure: REPAIR, ANEURYSM, ARTERY, FEMORAL;  Surgeon: Yuri Washington MD;  [facility];  Service: Cardiovascular;  Laterality: Right;  Right Femoral Artery Repair    RIGHT HEART CATHETERIZATION Right 10/10/2023    Procedure: INSERTION, CATHETER, RIGHT HEART;  Surgeon: Bin Gandhi MD;  Location: [facility] CATH LAB;  Service: Cardiology;  Laterality: Right;    RIGHT HEART CATHETERIZATION Right 10/13/2023    Procedure: INSERTION, CATHETER, RIGHT HEART;  Surgeon: Walter Mcintyre MD;  Location: [facility] CATH LAB;  Service:  Cardiology;  Laterality: Right;    RIGHT HEART CATHETERIZATION  11/13/2023    RIGHT HEART CATHETERIZATION Right 11/13/2023    Procedure: INSERTION, CATHETER, RIGHT HEART;  Surgeon: Juventino Bermudez Jr., MD;  Location: [facility] CATH LAB;  Service: Cardiology;  Laterality: Right;    RIGHT HEART CATHETERIZATION Right 11/20/2023    Procedure: INSERTION, CATHETER, RIGHT HEART;  Surgeon: Sajan Hurley MD;  Location: [facility] CATH LAB;  Service: Cardiology;  Laterality: Right;    RIGHT HEART CATHETERIZATION Right 1/22/2024    Procedure: INSERTION, CATHETER, RIGHT HEART;  Surgeon: Brayan Ocampo MD;  Location: [facility] CATH LAB;  Service: Cardiology;  Laterality: Right;    STERNAL WOUND CLOSURE N/A 12/4/2023    Procedure: CLOSURE, WOUND, STERNUM;  Surgeon: Yuri Washington MD;  Location: [facility] OR [facility] FLR;  Service: Cardiovascular;  Laterality: N/A;    STERNOTOMY N/A 12/1/2023    Procedure: STERNOTOMY, REDO;  Surgeon: Yuri Washington MD;  Location: [facility] OR 2ND FLR;  Service: Cardiovascular;  Laterality: N/A;    VALVULOPLASTY, MITRAL VALVE N/A 12/1/2023    Procedure: VALVULOPLASTY, MITRAL VALVE;  Surgeon: Yuri Washington MD;  Location: [facility] OR [facility] FLR;  Service: Cardiovascular;  Laterality: N/A;     No family history on file.  Social History     Tobacco Use    Smoking status: Former     Current packs/day: 0.50     Types: Cigarettes   Substance Use Topics    Alcohol use: Yes     Comment: rarely    Drug use: No       Review of patient's allergies indicates:  No Known Allergies      Objective:   VS (24h):   Vitals:    08/29/24 1116   BP: (!) 110/53   Pulse: 83   Temp: 98 °F (36.7 °C)         Physical Exam  Constitutional:       General: He is not in acute distress.     Appearance: He is not ill-appearing or toxic-appearing.   HENT:      Head: Normocephalic and atraumatic.   Eyes:      General:         Right eye: No discharge.   Pulmonary:      Effort: Pulmonary effort is normal. No respiratory distress.   Neurological:       Mental Status: He is alert and oriented to person, place, and time.           Micro:   8/29 blcx in process          Immunization History   Administered Date(s) Administered    COVID-19 MRNA, LN-S PF (MODERNA HALF 0.25 ML DOSE) 12/13/2021    COVID-19, MRNA, LN-S, PF (MODERNA FULL 0.5 ML DOSE) 03/18/2021, 04/15/2021    DTaP 06/27/2012    Hepatitis A, Adult 11/17/2023    Hepatitis B (recombinant) Adjuvanted, 2 dose 09/03/2023, 11/17/2023    Influenza - Quadrivalent 11/09/2021    PPD Test 12/22/2023    Pneumococcal Conjugate - 20 Valent 09/03/2023    RSVpreF (Arexvy) 09/03/2023    Tdap 06/27/2012, 09/03/2023    Zoster Recombinant 09/03/2023         Assessment:     1. Clostridium difficile infection    2. LVAD (left ventricular assist device) present    3. Bacteremia due to Enterococcus       64 y.o. male with LVAD 12/2023 as DT, AICD, ESRD on HD, and prior Efaecalis bacteremia with recent admission for CDAD who is referred to my clinic for hospital follow up. Previously on amoxil suppressive therapy; however, stopped during last hospital visit due to Cdiff. Discussed risks/benefits of suppressive abx therapy - shared decision making to stop amoxil with continued surveillance blood cx. Denies ongoing symptoms of infection.     Plan:     -follow up surveillance blood cx      These recommendations have been sent to and/or discussed with the following providers:   - HTS      Management of cdiff was discussed with patient. Patient was given ample time for questions, all questions answered. Strict return precautions given to patient.       13 minutes of total time spent on the encounter, which includes face to face time and non-face to face time preparing to see the patient (eg, review of tests), Obtaining and/or reviewing separately obtained history, documenting clinical information in the electronic or other health record, independently interpreting results (not separately reported) and communicating results to the  patient/family/caregiver, or care coordination (not separately reported).           Layne Robles MD  Infectious Disease

## 2024-08-29 NOTE — LETTER
August 29, 2024        Kevin Franklin  [address] [place] [address]  Phone: [phone]  Fax: [phone]             [place] [facility]  [address]  [facility] [address]  Phone: [phone]   Patient: Radha Abbott   MR Number: [id number]   YOB: 1960   Date of Visit: 8/29/2024       Dear Dr. Kevin Franklin    Thank you for referring Radha Abbott to me for evaluation. Attached you will find relevant portions of my assessment and plan of care.    If you have questions, please do not hesitate to call me. I look forward to following Rahda Abbott along with you.    Sincerely,    Brayan Ocampo MD    Enclosure    If you would like to receive this communication electronically, please contact [email or web address] or [phone] to request [employer] Link access.    [employer] Link is a tool which provides read-only access to select patient information with whom you have a relationship. Its easy to use and provides real time access to review your patients record including encounter summaries, notes, results, and demographic information.    If you feel you have received this communication in error or would no longer like to receive these types of communications, please e-mail [email or web address]

## 2024-09-03 ENCOUNTER — LAB VISIT (OUTPATIENT)
Dept: LAB | Facility: HOSPITAL | Age: 64
End: 2024-09-03
Attending: INTERNAL MEDICINE
Payer: MEDICAID

## 2024-09-03 DIAGNOSIS — N18.6 MALIGNANT HYPERTENSION WITH HEART FAILURE AND END-STAGE RENAL DIS: ICD-10-CM

## 2024-09-03 DIAGNOSIS — I13.2 MALIGNANT HYPERTENSION WITH HEART FAILURE AND END-STAGE RENAL DIS: ICD-10-CM

## 2024-09-03 DIAGNOSIS — I50.43 ACUTE ON CHRONIC COMBINED SYSTOLIC AND DIASTOLIC HEART FAILURE: ICD-10-CM

## 2024-09-03 DIAGNOSIS — Z95.811 HEART REPLACED BY HEART ASSIST DEVICE: Primary | ICD-10-CM

## 2024-09-03 LAB
ALBUMIN SERPL BCP-MCNC: 2.5 G/DL (ref 3.5–5.2)
ALP SERPL-CCNC: 112 U/L (ref 55–135)
ALT SERPL W/O P-5'-P-CCNC: 13 U/L (ref 10–44)
ANION GAP SERPL CALC-SCNC: 9 MMOL/L (ref 8–16)
AST SERPL-CCNC: 14 U/L (ref 10–40)
BASOPHILS # BLD AUTO: 0.04 K/UL (ref 0–0.2)
BASOPHILS NFR BLD: 0.7 % (ref 0–1.9)
BILIRUB SERPL-MCNC: 0.4 MG/DL (ref 0.1–1)
BNP SERPL-MCNC: 662 PG/ML (ref 0–99)
BUN SERPL-MCNC: 31 MG/DL (ref 8–23)
CALCIUM SERPL-MCNC: 8 MG/DL (ref 8.7–10.5)
CHLORIDE SERPL-SCNC: 110 MMOL/L (ref 95–110)
CO2 SERPL-SCNC: 20 MMOL/L (ref 23–29)
CREAT SERPL-MCNC: 4.3 MG/DL (ref 0.5–1.4)
DIFFERENTIAL METHOD BLD: ABNORMAL
EOSINOPHIL # BLD AUTO: 0.3 K/UL (ref 0–0.5)
EOSINOPHIL NFR BLD: 4.6 % (ref 0–8)
ERYTHROCYTE [DISTWIDTH] IN BLOOD BY AUTOMATED COUNT: 18.3 % (ref 11.5–14.5)
EST. GFR  (NO RACE VARIABLE): 15 ML/MIN/1.73 M^2
GLUCOSE SERPL-MCNC: 161 MG/DL (ref 70–110)
HCT VFR BLD AUTO: 26.2 % (ref 40–54)
HGB BLD-MCNC: 7.8 G/DL (ref 14–18)
IMM GRANULOCYTES # BLD AUTO: 0.06 K/UL (ref 0–0.04)
IMM GRANULOCYTES NFR BLD AUTO: 1 % (ref 0–0.5)
INR PPP: 2.4 (ref 0.8–1.2)
LYMPHOCYTES # BLD AUTO: 0.6 K/UL (ref 1–4.8)
LYMPHOCYTES NFR BLD: 10.2 % (ref 18–48)
MAGNESIUM SERPL-MCNC: 1.8 MG/DL (ref 1.6–2.6)
MCH RBC QN AUTO: 26.5 PG (ref 27–31)
MCHC RBC AUTO-ENTMCNC: 29.8 G/DL (ref 32–36)
MCV RBC AUTO: 89 FL (ref 82–98)
MONOCYTES # BLD AUTO: 0.5 K/UL (ref 0.3–1)
MONOCYTES NFR BLD: 7.9 % (ref 4–15)
NEUTROPHILS # BLD AUTO: 4.6 K/UL (ref 1.8–7.7)
NEUTROPHILS NFR BLD: 75.6 % (ref 38–73)
NRBC BLD-RTO: 0 /100 WBC
PLATELET # BLD AUTO: 275 K/UL (ref 150–450)
PMV BLD AUTO: 9.4 FL (ref 9.2–12.9)
POTASSIUM SERPL-SCNC: 3.9 MMOL/L (ref 3.5–5.1)
PROT SERPL-MCNC: 6.7 G/DL (ref 6–8.4)
PROTHROMBIN TIME: 24.7 SEC (ref 9–12.5)
RBC # BLD AUTO: 2.94 M/UL (ref 4.6–6.2)
SODIUM SERPL-SCNC: 139 MMOL/L (ref 136–145)
WBC # BLD AUTO: 6.05 K/UL (ref 3.9–12.7)

## 2024-09-03 PROCEDURE — 36415 COLL VENOUS BLD VENIPUNCTURE: CPT | Performed by: INTERNAL MEDICINE

## 2024-09-03 PROCEDURE — 85610 PROTHROMBIN TIME: CPT | Performed by: INTERNAL MEDICINE

## 2024-09-03 PROCEDURE — 85025 COMPLETE CBC W/AUTO DIFF WBC: CPT | Performed by: INTERNAL MEDICINE

## 2024-09-03 PROCEDURE — 83735 ASSAY OF MAGNESIUM: CPT | Performed by: INTERNAL MEDICINE

## 2024-09-03 PROCEDURE — 83880 ASSAY OF NATRIURETIC PEPTIDE: CPT | Performed by: INTERNAL MEDICINE

## 2024-09-03 PROCEDURE — 80053 COMPREHEN METABOLIC PANEL: CPT | Performed by: INTERNAL MEDICINE

## 2024-09-03 NOTE — FIRST PROVIDER EVALUATION
Medical screening examination initiated.  I have conducted a focused provider triage encounter, findings are as follows:    Brief history of present illness:  Patient states his defibrillator shocked him 4 days ago, states that it buzzed this morning.  Denies any chest pain, shortness of breath or any other symptoms.    Vitals:    10/07/23 0946   BP: (!) 103/58   BP Location: Right arm   Patient Position: Sitting   Pulse: 87   Resp: 16   Temp: 98.6 °F (37 °C)   TempSrc: Oral   SpO2: 100%   Weight: 80.3 kg (176 lb 14.7 oz)       Pertinent physical exam:  No acute distress, vital signs stable    Brief workup plan:  Workup and further evaluation    Preliminary workup initiated; this workup will be continued and followed by the physician or advanced practice provider that is assigned to the patient when roomed.  
None known

## 2024-09-04 ENCOUNTER — ANTI-COAG VISIT (OUTPATIENT)
Dept: CARDIOLOGY | Facility: CLINIC | Age: 64
End: 2024-09-04
Payer: MEDICAID

## 2024-09-04 DIAGNOSIS — Z95.811 LVAD (LEFT VENTRICULAR ASSIST DEVICE) PRESENT: Primary | ICD-10-CM

## 2024-09-04 PROCEDURE — 93793 ANTICOAG MGMT PT WARFARIN: CPT | Mod: ,,,

## 2024-09-04 NOTE — PROGRESS NOTES
[facility]    2024 10:29 AM    Assessment/Plan:    Patient presents today with therapeutic INR.    Assessment of patient findings and chart review: no significant findings     Recommendation for patient's warfarin regimen: Continue current maintenance dose    Recommend repeat INR in 1 week  _________________________________________________________________    Radha Abbott (64 y.o.) is followed by the [facility].    Anticoagulation Summary  As of 2024      INR goal:  2.0-3.0   TTR:  58.6% (4.3 mo)   INR used for dosin.4 (9/3/2024)   Warfarin maintenance plan:  2.5 mg (2.5 mg x 1) every day   Weekly warfarin total:  17.5 mg   Plan last modified:  Jesenia Jensen, PharmD (2024)   Next INR check:  9/10/2024   Target end date:      Indications    LVAD (left ventricular assist device) present [Z95.811]                 Anticoagulation Episode Summary       INR check location:      Preferred lab:      Send INR reminders to:  [facility]MADIN LVAD    Comments:  [facility]  ( [phone], fax [phone])  // [facility]          Anticoagulation Care Providers       Provider Role Specialty Phone number    Sajan Hurley MD [facility] Cardiology [phone]

## 2024-09-10 ENCOUNTER — ANTI-COAG VISIT (OUTPATIENT)
Dept: CARDIOLOGY | Facility: CLINIC | Age: 64
End: 2024-09-10
Payer: MEDICAID

## 2024-09-10 ENCOUNTER — LAB VISIT (OUTPATIENT)
Dept: LAB | Facility: HOSPITAL | Age: 64
End: 2024-09-10
Attending: INTERNAL MEDICINE
Payer: MEDICAID

## 2024-09-10 DIAGNOSIS — Z95.811 LVAD (LEFT VENTRICULAR ASSIST DEVICE) PRESENT: Primary | ICD-10-CM

## 2024-09-10 DIAGNOSIS — Z95.811 HEART REPLACED BY HEART ASSIST DEVICE: Primary | ICD-10-CM

## 2024-09-10 LAB
INR PPP: 3 (ref 0.8–1.2)
PROTHROMBIN TIME: 30.8 SEC (ref 9–12.5)

## 2024-09-10 PROCEDURE — 93793 ANTICOAG MGMT PT WARFARIN: CPT | Mod: ,,,

## 2024-09-10 PROCEDURE — 36415 COLL VENOUS BLD VENIPUNCTURE: CPT | Performed by: INTERNAL MEDICINE

## 2024-09-10 PROCEDURE — 85610 PROTHROMBIN TIME: CPT | Performed by: INTERNAL MEDICINE

## 2024-09-10 NOTE — PROGRESS NOTES
[facility]    09/10/2024 1:44 PM    Assessment/Plan:    Patient presents today with therapeutic INR.    Assessment of patient findings and chart review: no significant findings     Recommendation for patient's warfarin regimen: Continue current maintenance dose    Recommend repeat INR in 1 week  _________________________________________________________________    Radha Abbott (64 y.o.) is followed by the [facility].    Anticoagulation Summary  As of 9/10/2024      INR goal:  2.0-3.0   TTR:  60.7% (4.5 mo)   INR used for dosing:  3.0 (9/10/2024)   Warfarin maintenance plan:  2.5 mg (2.5 mg x 1) every day   Weekly warfarin total:  17.5 mg   Plan last modified:  Jesenia Jensen, PharmD (7/26/2024)   Next INR check:  9/17/2024   Target end date:      Indications    LVAD (left ventricular assist device) present [Z95.811]                 Anticoagulation Episode Summary       INR check location:      Preferred lab:      Send INR reminders to:  [facility] COUMADIN LVAD    Comments:  [facility]  ( [phone], fax [phone])  // [facility]          Anticoagulation Care Providers       Provider Role Specialty Phone number    Sajan Hurley MD [facility] Cardiology [phone]

## 2024-09-17 ENCOUNTER — LAB VISIT (OUTPATIENT)
Dept: LAB | Facility: HOSPITAL | Age: 64
End: 2024-09-17
Attending: INTERNAL MEDICINE
Payer: MEDICAID

## 2024-09-17 ENCOUNTER — ANTI-COAG VISIT (OUTPATIENT)
Dept: CARDIOLOGY | Facility: CLINIC | Age: 64
End: 2024-09-17
Payer: MEDICAID

## 2024-09-17 DIAGNOSIS — Z95.811 LVAD (LEFT VENTRICULAR ASSIST DEVICE) PRESENT: Primary | ICD-10-CM

## 2024-09-17 DIAGNOSIS — D63.1 ANEMIA OF CHRONIC RENAL FAILURE: ICD-10-CM

## 2024-09-17 DIAGNOSIS — N18.6 MALIGNANT HYPERTENSION WITH HEART FAILURE AND END-STAGE RENAL DIS: Primary | ICD-10-CM

## 2024-09-17 DIAGNOSIS — N18.9 ANEMIA OF CHRONIC RENAL FAILURE: ICD-10-CM

## 2024-09-17 DIAGNOSIS — I50.43 ACUTE ON CHRONIC COMBINED SYSTOLIC AND DIASTOLIC HEART FAILURE: ICD-10-CM

## 2024-09-17 DIAGNOSIS — N18.6 END STAGE RENAL DISEASE: ICD-10-CM

## 2024-09-17 DIAGNOSIS — I13.2 MALIGNANT HYPERTENSION WITH HEART FAILURE AND END-STAGE RENAL DIS: Primary | ICD-10-CM

## 2024-09-17 LAB
ALBUMIN SERPL BCP-MCNC: 2.6 G/DL (ref 3.5–5.2)
ALP SERPL-CCNC: 109 U/L (ref 55–135)
ALT SERPL W/O P-5'-P-CCNC: 13 U/L (ref 10–44)
ANION GAP SERPL CALC-SCNC: 9 MMOL/L (ref 8–16)
AST SERPL-CCNC: 17 U/L (ref 10–40)
BASOPHILS # BLD AUTO: 0.04 K/UL (ref 0–0.2)
BASOPHILS NFR BLD: 0.7 % (ref 0–1.9)
BILIRUB SERPL-MCNC: 0.3 MG/DL (ref 0.1–1)
BNP SERPL-MCNC: 743 PG/ML (ref 0–99)
BUN SERPL-MCNC: 25 MG/DL (ref 8–23)
CALCIUM SERPL-MCNC: 7.8 MG/DL (ref 8.7–10.5)
CHLORIDE SERPL-SCNC: 109 MMOL/L (ref 95–110)
CO2 SERPL-SCNC: 20 MMOL/L (ref 23–29)
CREAT SERPL-MCNC: 4.1 MG/DL (ref 0.5–1.4)
DIFFERENTIAL METHOD BLD: ABNORMAL
EOSINOPHIL # BLD AUTO: 0.2 K/UL (ref 0–0.5)
EOSINOPHIL NFR BLD: 3.9 % (ref 0–8)
ERYTHROCYTE [DISTWIDTH] IN BLOOD BY AUTOMATED COUNT: 18.2 % (ref 11.5–14.5)
EST. GFR  (NO RACE VARIABLE): 16 ML/MIN/1.73 M^2
GLUCOSE SERPL-MCNC: 168 MG/DL (ref 70–110)
HCT VFR BLD AUTO: 32.5 % (ref 40–54)
HGB BLD-MCNC: 9.8 G/DL (ref 14–18)
IMM GRANULOCYTES # BLD AUTO: 0.02 K/UL (ref 0–0.04)
IMM GRANULOCYTES NFR BLD AUTO: 0.3 % (ref 0–0.5)
INR PPP: 2.4 (ref 0.8–1.2)
LYMPHOCYTES # BLD AUTO: 0.7 K/UL (ref 1–4.8)
LYMPHOCYTES NFR BLD: 12.3 % (ref 18–48)
MAGNESIUM SERPL-MCNC: 1.9 MG/DL (ref 1.6–2.6)
MCH RBC QN AUTO: 27.4 PG (ref 27–31)
MCHC RBC AUTO-ENTMCNC: 30.2 G/DL (ref 32–36)
MCV RBC AUTO: 91 FL (ref 82–98)
MONOCYTES # BLD AUTO: 0.6 K/UL (ref 0.3–1)
MONOCYTES NFR BLD: 9.3 % (ref 4–15)
NEUTROPHILS # BLD AUTO: 4.4 K/UL (ref 1.8–7.7)
NEUTROPHILS NFR BLD: 73.5 % (ref 38–73)
NRBC BLD-RTO: 0 /100 WBC
PLATELET # BLD AUTO: 255 K/UL (ref 150–450)
PMV BLD AUTO: 9.5 FL (ref 9.2–12.9)
POTASSIUM SERPL-SCNC: 4.8 MMOL/L (ref 3.5–5.1)
PROT SERPL-MCNC: 7.2 G/DL (ref 6–8.4)
PROTHROMBIN TIME: 26.6 SEC (ref 9–12.5)
RBC # BLD AUTO: 3.58 M/UL (ref 4.6–6.2)
SODIUM SERPL-SCNC: 138 MMOL/L (ref 136–145)
WBC # BLD AUTO: 5.93 K/UL (ref 3.9–12.7)

## 2024-09-17 PROCEDURE — 85025 COMPLETE CBC W/AUTO DIFF WBC: CPT | Performed by: INTERNAL MEDICINE

## 2024-09-17 PROCEDURE — 36415 COLL VENOUS BLD VENIPUNCTURE: CPT | Performed by: INTERNAL MEDICINE

## 2024-09-17 PROCEDURE — 85610 PROTHROMBIN TIME: CPT | Performed by: INTERNAL MEDICINE

## 2024-09-17 PROCEDURE — 80053 COMPREHEN METABOLIC PANEL: CPT | Performed by: INTERNAL MEDICINE

## 2024-09-17 PROCEDURE — 93793 ANTICOAG MGMT PT WARFARIN: CPT | Mod: ,,,

## 2024-09-17 PROCEDURE — 83880 ASSAY OF NATRIURETIC PEPTIDE: CPT | Performed by: INTERNAL MEDICINE

## 2024-09-17 PROCEDURE — 83735 ASSAY OF MAGNESIUM: CPT | Performed by: INTERNAL MEDICINE

## 2024-09-17 NOTE — PROGRESS NOTES
[facility]    2024 2:11 PM    Assessment/Plan:    Patient presents today with therapeutic INR.    Assessment of patient findings and chart review: no significant findings     Recommendation for patient's warfarin regimen: Continue current maintenance dose    Recommend repeat INR in 1 week  _________________________________________________________________    Radha Abbott (64 y.o.) is followed by the [facility].    Anticoagulation Summary  As of 2024      INR goal:  2.0-3.0   TTR:  62.6% (4.8 mo)   INR used for dosin.4 (2024)   Warfarin maintenance plan:  2.5 mg (2.5 mg x 1) every day   Weekly warfarin total:  17.5 mg   Plan last modified:  Jesenia Jensen, PharmD (2024)   Next INR check:  2024   Target end date:      Indications    LVAD (left ventricular assist device) present [Z95.811]                 Anticoagulation Episode Summary       INR check location:      Preferred lab:      Send INR reminders to:  [facility] COUMADIN LVAD    Comments:  [facility]  ( [phone], fax [phone])  // [facility]          Anticoagulation Care Providers       Provider Role Specialty Phone number    Sajan Hurley MD [facility] Cardiology [phone]

## 2024-09-18 ENCOUNTER — DOCUMENT SCAN (OUTPATIENT)
Dept: HOME HEALTH SERVICES | Facility: HOSPITAL | Age: 64
End: 2024-09-18
Payer: MEDICAID

## 2024-09-24 ENCOUNTER — DOCUMENT SCAN (OUTPATIENT)
Dept: HOME HEALTH SERVICES | Facility: HOSPITAL | Age: 64
End: 2024-09-24
Payer: MEDICAID

## 2024-09-26 ENCOUNTER — ANTI-COAG VISIT (OUTPATIENT)
Dept: CARDIOLOGY | Facility: CLINIC | Age: 64
End: 2024-09-26
Payer: MEDICAID

## 2024-09-26 ENCOUNTER — OFFICE VISIT (OUTPATIENT)
Dept: TRANSPLANT | Facility: CLINIC | Age: 64
End: 2024-09-26
Payer: MEDICAID

## 2024-09-26 ENCOUNTER — CLINICAL SUPPORT (OUTPATIENT)
Dept: TRANSPLANT | Facility: CLINIC | Age: 64
End: 2024-09-26
Payer: MEDICAID

## 2024-09-26 ENCOUNTER — LAB VISIT (OUTPATIENT)
Dept: LAB | Facility: HOSPITAL | Age: 64
End: 2024-09-26
Payer: MEDICAID

## 2024-09-26 VITALS — HEART RATE: 109 BPM | HEIGHT: 73 IN | WEIGHT: 175 LBS | BODY MASS INDEX: 23.19 KG/M2 | SYSTOLIC BLOOD PRESSURE: 82 MMHG

## 2024-09-26 DIAGNOSIS — I47.20 VENTRICULAR TACHYCARDIA: ICD-10-CM

## 2024-09-26 DIAGNOSIS — N18.6 CKD (CHRONIC KIDNEY DISEASE) STAGE V REQUIRING CHRONIC DIALYSIS: ICD-10-CM

## 2024-09-26 DIAGNOSIS — I25.10 CORONARY ARTERY DISEASE INVOLVING NATIVE CORONARY ARTERY OF NATIVE HEART WITHOUT ANGINA PECTORIS: ICD-10-CM

## 2024-09-26 DIAGNOSIS — Z99.2 CKD (CHRONIC KIDNEY DISEASE) STAGE V REQUIRING CHRONIC DIALYSIS: ICD-10-CM

## 2024-09-26 DIAGNOSIS — Z95.811 PRESENCE OF VENTRICULAR ASSIST DEVICE: Primary | ICD-10-CM

## 2024-09-26 DIAGNOSIS — Z95.811 PRESENCE OF VENTRICULAR ASSIST DEVICE: ICD-10-CM

## 2024-09-26 DIAGNOSIS — I50.42 CHRONIC COMBINED SYSTOLIC AND DIASTOLIC HEART FAILURE: ICD-10-CM

## 2024-09-26 DIAGNOSIS — I48.0 PAF (PAROXYSMAL ATRIAL FIBRILLATION): ICD-10-CM

## 2024-09-26 DIAGNOSIS — E11.9 TYPE 2 DIABETES MELLITUS WITHOUT COMPLICATION, WITHOUT LONG-TERM CURRENT USE OF INSULIN: ICD-10-CM

## 2024-09-26 DIAGNOSIS — Z95.811 LVAD (LEFT VENTRICULAR ASSIST DEVICE) PRESENT: ICD-10-CM

## 2024-09-26 DIAGNOSIS — Z95.811 HEART REPLACED: Primary | ICD-10-CM

## 2024-09-26 DIAGNOSIS — Z95.811 LVAD (LEFT VENTRICULAR ASSIST DEVICE) PRESENT: Primary | ICD-10-CM

## 2024-09-26 LAB
ALBUMIN SERPL BCP-MCNC: 2.7 G/DL (ref 3.5–5.2)
ALP SERPL-CCNC: 113 U/L (ref 55–135)
ALT SERPL W/O P-5'-P-CCNC: 14 U/L (ref 10–44)
ANION GAP SERPL CALC-SCNC: 7 MMOL/L (ref 8–16)
AST SERPL-CCNC: 17 U/L (ref 10–40)
BASOPHILS # BLD AUTO: 0.04 K/UL (ref 0–0.2)
BASOPHILS NFR BLD: 0.7 % (ref 0–1.9)
BILIRUB DIRECT SERPL-MCNC: 0.1 MG/DL (ref 0.1–0.3)
BILIRUB SERPL-MCNC: 0.3 MG/DL (ref 0.1–1)
BNP SERPL-MCNC: 500 PG/ML (ref 0–99)
BUN SERPL-MCNC: 22 MG/DL (ref 8–23)
CALCIUM SERPL-MCNC: 8.4 MG/DL (ref 8.7–10.5)
CHLORIDE SERPL-SCNC: 106 MMOL/L (ref 95–110)
CO2 SERPL-SCNC: 23 MMOL/L (ref 23–29)
CREAT SERPL-MCNC: 4 MG/DL (ref 0.5–1.4)
CRP SERPL-MCNC: 30.9 MG/L (ref 0–8.2)
DIFFERENTIAL METHOD BLD: ABNORMAL
EOSINOPHIL # BLD AUTO: 0.3 K/UL (ref 0–0.5)
EOSINOPHIL NFR BLD: 4.1 % (ref 0–8)
ERYTHROCYTE [DISTWIDTH] IN BLOOD BY AUTOMATED COUNT: 18 % (ref 11.5–14.5)
EST. GFR  (NO RACE VARIABLE): 16.1 ML/MIN/1.73 M^2
GLUCOSE SERPL-MCNC: 164 MG/DL (ref 70–110)
HCT VFR BLD AUTO: 35.4 % (ref 40–54)
HGB BLD-MCNC: 10 G/DL (ref 14–18)
IMM GRANULOCYTES # BLD AUTO: 0.04 K/UL (ref 0–0.04)
IMM GRANULOCYTES NFR BLD AUTO: 0.7 % (ref 0–0.5)
INR PPP: 2.9 (ref 0.8–1.2)
LDH SERPL L TO P-CCNC: 260 U/L (ref 110–260)
LYMPHOCYTES # BLD AUTO: 0.6 K/UL (ref 1–4.8)
LYMPHOCYTES NFR BLD: 9.9 % (ref 18–48)
MAGNESIUM SERPL-MCNC: 1.9 MG/DL (ref 1.6–2.6)
MCH RBC QN AUTO: 26.4 PG (ref 27–31)
MCHC RBC AUTO-ENTMCNC: 28.2 G/DL (ref 32–36)
MCV RBC AUTO: 93 FL (ref 82–98)
MONOCYTES # BLD AUTO: 0.5 K/UL (ref 0.3–1)
MONOCYTES NFR BLD: 7.9 % (ref 4–15)
NEUTROPHILS # BLD AUTO: 4.6 K/UL (ref 1.8–7.7)
NEUTROPHILS NFR BLD: 76.7 % (ref 38–73)
NRBC BLD-RTO: 0 /100 WBC
PHOSPHATE SERPL-MCNC: 2.1 MG/DL (ref 2.7–4.5)
PLATELET # BLD AUTO: 249 K/UL (ref 150–450)
PMV BLD AUTO: 9.2 FL (ref 9.2–12.9)
POTASSIUM SERPL-SCNC: 4.2 MMOL/L (ref 3.5–5.1)
PREALB SERPL-MCNC: 24 MG/DL (ref 20–43)
PROT SERPL-MCNC: 7.5 G/DL (ref 6–8.4)
PROTHROMBIN TIME: 30 SEC (ref 9–12.5)
RBC # BLD AUTO: 3.79 M/UL (ref 4.6–6.2)
SODIUM SERPL-SCNC: 136 MMOL/L (ref 136–145)
WBC # BLD AUTO: 6.05 K/UL (ref 3.9–12.7)

## 2024-09-26 PROCEDURE — 99999 PR PBB SHADOW E&M-EST. PATIENT-LVL IV: CPT | Mod: PBBFAC,,, | Performed by: INTERNAL MEDICINE

## 2024-09-26 PROCEDURE — 85610 PROTHROMBIN TIME: CPT | Performed by: INTERNAL MEDICINE

## 2024-09-26 PROCEDURE — 86140 C-REACTIVE PROTEIN: CPT | Performed by: INTERNAL MEDICINE

## 2024-09-26 PROCEDURE — 83615 LACTATE (LD) (LDH) ENZYME: CPT | Performed by: INTERNAL MEDICINE

## 2024-09-26 PROCEDURE — 99214 OFFICE O/P EST MOD 30 MIN: CPT | Mod: PBBFAC | Performed by: INTERNAL MEDICINE

## 2024-09-26 PROCEDURE — 85025 COMPLETE CBC W/AUTO DIFF WBC: CPT | Performed by: INTERNAL MEDICINE

## 2024-09-26 PROCEDURE — 84100 ASSAY OF PHOSPHORUS: CPT | Performed by: INTERNAL MEDICINE

## 2024-09-26 PROCEDURE — 83880 ASSAY OF NATRIURETIC PEPTIDE: CPT | Performed by: INTERNAL MEDICINE

## 2024-09-26 PROCEDURE — 83735 ASSAY OF MAGNESIUM: CPT | Performed by: INTERNAL MEDICINE

## 2024-09-26 PROCEDURE — 36415 COLL VENOUS BLD VENIPUNCTURE: CPT | Performed by: INTERNAL MEDICINE

## 2024-09-26 PROCEDURE — 82248 BILIRUBIN DIRECT: CPT | Performed by: INTERNAL MEDICINE

## 2024-09-26 PROCEDURE — 99999PBSHW PR PBB SHADOW TECHNICAL ONLY FILED TO HB: Mod: PBBFAC,,,

## 2024-09-26 PROCEDURE — 84134 ASSAY OF PREALBUMIN: CPT | Performed by: INTERNAL MEDICINE

## 2024-09-26 PROCEDURE — 80053 COMPREHEN METABOLIC PANEL: CPT | Performed by: INTERNAL MEDICINE

## 2024-09-26 RX ORDER — GABAPENTIN 300 MG/1
300 CAPSULE ORAL NIGHTLY
COMMUNITY
Start: 2024-09-14

## 2024-09-26 NOTE — PROCEDURES
9/26/2024    10:21 AM 8/29/2024    11:07 AM 8/7/2024     4:00 PM 8/7/2024    12:13 PM 8/7/2024     7:16 AM 8/7/2024     3:19 AM 8/7/2024    12:57 AM   TXP PINA INTERROGATIONS   Type HeartMate3 HeartMate3        Flow 3.8 4        Speed 5000 5000        PI 6.8 4        Power (Carrington) 3.5 3.4        LSL 4600 4600        Pulsatility No Pulse No Pulse Intermittent pulse Intermittent pulse Intermittent pulse Pulse Intermittent pulse   }

## 2024-09-26 NOTE — PROGRESS NOTES
"Date of Implant with Heartmate 3 LVAD: 12/1/23    PATIENT ARRIVED IN CLINIC:  Wheelchair  Accompanied by: wife  Complaints/reason for visit today: routine    Vitals  Temperature, oral:   Temp Readings from Last 1 Encounters:   08/29/24 98 °F (36.7 °C) (Oral)     Blood Pressure:   BP Readings from Last 3 Encounters:   09/26/24 (!) 82/0   08/29/24 (!) 110/53   08/29/24 (!) 78/0        VAD Interrogation:      9/26/2024    10:21 AM 8/29/2024    11:07 AM 8/7/2024     4:00 PM   TXP PINA INTERROGATIONS   Type HeartMate3 HeartMate3    Flow 3.8 4    Speed 5000 5000    PI 6.8 4    Power (Carrington) 3.5 3.4    LSL 4600 4600    Pulsatility No Pulse No Pulse Intermittent pulse     HCT:   Lab Results   Component Value Date    HCT 35.4 (L) 09/26/2024    HCT 39 07/23/2024       VAD Assessment  Problems / Issues /Equipment Needs/ Alarms with VAD if any: None noted  VAD Sounds: HM3   VAD Binder With Patient: No  Reviewed VAD Numbers In Binder: No  Emergency Equipment With Patient: Yes   Equipment Needs: No   It is medically necessary to ensure patient has properly functioning equipment and wearables to prevent infection, injury or death to patient.     DLES Assessment and Dressing Care:  Appearance of DLES: "1"  Antibiotics: NO  Velour: No  Manual & Visual Inspection Of Driveline: No kinks or tears noted  Stabilization Device In Use: yes, goodman securement device    Heartmate 3 Module Cable:  No yellow exposed and Attempted to unscrew modular cable to ensure it will be able to come lose in the event we ever need to change the modular cable while patient held the driveline in place so it would not move. Modular cable connection able to be unscrewed and re-tightened. Instructed pt to perform this weekly.  Frequency of Dressing Changes: weekly & silverlon kit   Pt In Need Of Management Kits?:yes -   1 Box of silverlon kit  It is medically necessary to have VAD management kits in order to prevent infection or to assist in the healing of an " infected DLES.    Assessment/ Quality of Life Survery:   Complaints Of Nausea / Vomiting: None noted    Appearance and Frequency Of Stools: normal and formed without blood & daily  Color Of Urine: clear/yellow  Pain: NO  Coping/Depression/Anxiety: coping okay.  Sleep Habits: 9 hrs /night  Sleep Aids:  Trazadone  Showering: No  Self- Care I have some problems washing or dressing myself  Mobility I have some problems in walking about  Usual Activities I have some problems with performing my usual activities  Activity/Exercise: Home health and was able to cut his grass last week on a riding    Driving: Yes. Reminded to pull over should there be an alarm before looking down at controller.      Labs:    Chemistry        Component Value Date/Time     09/26/2024 0906    K 4.2 09/26/2024 0906     09/26/2024 0906    CO2 23 09/26/2024 0906    BUN 22 09/26/2024 0906    CREATININE 4.0 (H) 09/26/2024 0906     (H) 09/26/2024 0906        Component Value Date/Time    CALCIUM 8.4 (L) 09/26/2024 0906    ALKPHOS 113 09/26/2024 0906    AST 17 09/26/2024 0906    ALT 14 09/26/2024 0906    BILITOT 0.3 09/26/2024 0906    ESTGFRAFRICA >60.0 02/07/2018 0935    EGFRNONAA >60.0 02/07/2018 0935            Magnesium   Date Value Ref Range Status   09/26/2024 1.9 1.6 - 2.6 mg/dL Final       Lab Results   Component Value Date    WBC 6.05 09/26/2024    HGB 10.0 (L) 09/26/2024    HCT 35.4 (L) 09/26/2024    MCV 93 09/26/2024     09/26/2024       Lab Results   Component Value Date    INR 2.9 (H) 09/26/2024    INR 2.4 (H) 09/17/2024    INR 3.0 (H) 09/10/2024       BNP   Date Value Ref Range Status   09/26/2024 500 (H) 0 - 99 pg/mL Final     Comment:     Values of less than 100 pg/ml are consistent with non-CHF populations.   09/17/2024 743 (H) 0 - 99 pg/mL Final     Comment:     Values of less than 100 pg/ml are consistent with non-CHF populations.   09/03/2024 662 (H) 0 - 99 pg/mL Final     Comment:     Values of  less than 100 pg/ml are consistent with non-CHF populations.       LD   Date Value Ref Range Status   09/26/2024 260 110 - 260 U/L Final     Comment:     Results are increased in hemolyzed samples.   08/29/2024 303 (H) 110 - 260 U/L Final     Comment:     Results are increased in hemolyzed samples.   08/12/2024 284 (H) 110 - 260 U/L Final     Comment:     Results are increased in hemolyzed samples.       Labs reviewed with patient: YES. Labs completed after clinic visit.  Called pt with results.     Medication Reconciliation: per MA.  New Medication Detail Provided: yes  Coumadin Managed by: [facility]    Education: Reviewed driveline care, emergency procedures, how to change the controller, alarms with patient, as well as discussed how to page the VAD coordinator in case of an emergency.   Educated patient/family that chest compressions are allowed in the event they are needed.    Reminded patient/caregiver not to touch their face and to cover their mouth when they cough or sneeze.   Vaccines: Pt informed that we are encouraging all VAD patients to receive  vaccines and boosters. Informed pt that they can take tylenol but should avoid other NSAIDs.       Plans/Needs: Pt working with therapy but not doing much more exercise than that.  Strongly encouraged to do more let and arm exercises. Her is using a wheelchair for his visit and when he walks at home, he is using a walker He was able cut his grass last week using his riding lawnmower.   He is going to HD M-W-F. They are removing almost 2.5 L each treatment per patient.  I continue to remind him to learn his medications. No changes made today.  RTC in 1 month    Hurricane Season: Yes, discussed with patient: With hurricane season approaching, we want to make sure you are fully prepared for any emergency.  Should the National Weather Service or your local authorities recommend a voluntary or mandatory evacuation of your area, The VAD team requires you  to evacuate to a safe place.  Remember, when it is a mandatory evacuation, traffic will become an issue for your limited battery power.  Therefore, we strongly urge you to evacuate early.      The VAD team advises you to have the following in place before hurricane season:  Have an evacuation plan in place including places to evacuate, names and phone numbers.  This information is required to be given to the VAD coordinator.  Have your VAD emergency contact numbers with you.  Make sure your prescriptions will not run out by the end of September.  Make sure you have enough medications, including pills, inhalers, patches,     etc. to take, should you be gone for more than 2 weeks.  Make sure ALL of your batteries are fully charged.  Bring enough dressing change supplies to last for at least 2 weeks.  Bring your VAD binder with you.  Make sure your binder is updated and complete with alarms reference card, patient hand book, emergency contact numbers, daily log sheets, etc.    If you do not have family or friends as an evacuation destination, we recommend evacuating to a safe area.   Do NOT evacuate to [facility].  The VAD team wants to stress the importance of planning for your evacuation in the event of a hurricane.  If you have any LVAD questions or issues, please contact the LVAD coordinator.

## 2024-09-26 NOTE — PROGRESS NOTES
"Subjective:   Patient ID:  Radha Abbott is a 64 y.o. male who presents for LVAD followup visit.       Implant Date:12/1/23     Heartmate 3 RPM 5000     INR goal: 2-3   Bridge with Heparin   Antiplatelets: None d/t low H/H on implant         9/26/2024    10:21 AM 8/29/2024    11:07 AM 8/7/2024     4:00 PM 8/7/2024    12:13 PM 8/7/2024     7:16 AM 8/7/2024     3:19 AM 8/7/2024    12:57 AM   TXP PINA INTERROGATIONS   Type HeartMate3 HeartMate3        Flow 3.8 4        Speed 5000 5000        PI 6.8 4        Power (Carrington) 3.5 3.4        LSL 4600 4600        Pulsatility No Pulse No Pulse Intermittent pulse Intermittent pulse Intermittent pulse Pulse Intermittent pulse       HPI  Mr. Radha Abbott is a 63 yo male with stage D HFrEF, ICMP who underwent HM3 placement placed by Dr Washington on 12/1/23. Lengthy post op course complicated by vasoplegia(requiring Giaprezza), debility, malnutrition/impaired swallowing, and renal failure requiring HD (since weaned off). Once medically stable, he was transferred to Rehab for further PT/OT/ST. He had done well for a hile but then had several clinical events including; worsening renal failure and  COVID/pneumonia. Was discharged and readmitted again for C diff for which he was successfully treated. He is on HD now on MWF (3 hours sessions). He comes today for a follow-up visit. Today comes for a follow-up visit. Has no complaints.  VAD speed is at 5000 rpm. No VAD alarms noted on interrogation occasional PI events . BP is 82 mm of Hg. DLES is a "1". Most recent INR was therapeutic at 2.4. LDH is at baseline.     Review of Systems   Constitutional: Negative. Negative for chills, decreased appetite, diaphoresis, fever, malaise/fatigue, night sweats, weight gain and weight loss.   Eyes: Negative.    Cardiovascular:  Negative for chest pain, claudication, cyanosis, dyspnea on exertion, irregular heartbeat, leg swelling, near-syncope, orthopnea, palpitations, paroxysmal nocturnal dyspnea and " "syncope.   Respiratory:  Negative for cough, hemoptysis and shortness of breath.    Endocrine: Negative.    Hematologic/Lymphatic: Negative.    Skin:  Negative for color change, dry skin and nail changes.   Musculoskeletal: Negative.    Gastrointestinal: Negative.    Genitourinary: Negative.    Neurological:  Negative for weakness.       Objective:   Blood pressure (!) 82/0, pulse 109, height 6' 1" (1.854 m), weight 79.4 kg (175 lb).body mass index is 23.09 kg/m².    Doppler: 82    Physical Exam  Vitals reviewed.   Constitutional:       Appearance: He is well-developed.      Comments: BP (!) 82/0 (BP Location: Left arm, Patient Position: Sitting) Comment (BP Method): doppler  Pulse 109   Ht 6' 1" (1.854 m)   Wt 79.4 kg (175 lb)   BMI 23.09 kg/m²      HENT:      Head: Normocephalic.   Neck:      Vascular: No carotid bruit or JVD.   Cardiovascular:      Heart sounds: No murmur heard.     Comments: Smooth VAD hum. DLES is "1"  Pulmonary:      Effort: Pulmonary effort is normal.      Breath sounds: Normal breath sounds.   Abdominal:      General: Bowel sounds are normal.      Palpations: Abdomen is soft.   Skin:     General: Skin is warm.   Neurological:      Mental Status: He is alert.         Lab Results   Component Value Date    WBC 5.93 09/17/2024    HGB 9.8 (L) 09/17/2024    HCT 32.5 (L) 09/17/2024    MCV 91 09/17/2024     09/17/2024    CO2 20 (L) 09/17/2024    CREATININE 4.1 (H) 09/17/2024    CALCIUM 7.8 (L) 09/17/2024    ALBUMIN 2.6 (L) 09/17/2024    AST 17 09/17/2024     (H) 09/17/2024    ALT 13 09/17/2024     (H) 08/29/2024       Lab Results   Component Value Date    INR 2.4 (H) 09/17/2024    INR 3.0 (H) 09/10/2024    INR 2.4 (H) 09/03/2024       BNP   Date Value Ref Range Status   09/17/2024 743 (H) 0 - 99 pg/mL Final     Comment:     Values of less than 100 pg/ml are consistent with non-CHF populations.   09/03/2024 662 (H) 0 - 99 pg/mL Final     Comment:     Values of less than 100 " pg/ml are consistent with non-CHF populations.   08/29/2024 1,138 (H) 0 - 99 pg/mL Final     Comment:     Values of less than 100 pg/ml are consistent with non-CHF populations.       LD   Date Value Ref Range Status   08/29/2024 303 (H) 110 - 260 U/L Final     Comment:     Results are increased in hemolyzed samples.   08/12/2024 284 (H) 110 - 260 U/L Final     Comment:     Results are increased in hemolyzed samples.   08/07/2024 222 110 - 260 U/L Final     Comment:     Results are increased in hemolyzed samples.       Assessment:      1. Presence of ventricular assist device    2. Chronic combined systolic and diastolic heart failure    3. Coronary artery disease involving native coronary artery of native heart without angina pectoris    4. Ventricular tachycardia    5. PAF (paroxysmal atrial fibrillation)    6. Type 2 diabetes mellitus without complication, without long-term current use of insulin    7. CKD (chronic kidney disease) stage V requiring chronic dialysis        Plan:   Patient is now NYHA class II but very weak and deconditioned.   Owing to his advanced kidney disease and being on dialysis with low BP, he is not on a  ACEI/ARB/ARNI or MRA. He is also not on a beta blocker due to RV dysfunction.   BP is controlled.  INR is supratherapeutic.   VAD interrogation was performed in clinic  Any VAD management kits dispensed today medically necessary  Recommend 2 gram sodium restriction and 1500cc fluid restriction.  Encourage physical activity with graded exercise program.  Requested patient to weigh themselves daily, and to notify us if their weight increases by more than 3 lbs in 1 day or 5 lbs in 1 week.   He will be seen by ID today  Additionally, the patient has a patient centered goal of being able to get stronger  RTC in 1 month      Listed for transplant: No. Patient inquired about candidacy for O[facility]x. In view of hsi clinical comorbidities and the need of Dual organ Herat and Kidney, he would likely be  declined in our center. My recommendation is for him to get stronger (very debilitated at this time) and get a second opinion.      UNOS Patient Status  Functional Status: 40% - Disabled: requires special care and assistance  Physical Capacity: No Limitations  Working for Income: No  If no, reason not working: Demands of Treatment        Advance Care Planning  Date: 05/23/2024  Patient has ACP docs in file.      Brayan Ocampo MD

## 2024-09-26 NOTE — PROGRESS NOTES
[facility]    2024 11:32 AM    Assessment/Plan:    Patient presents today with therapeutic INR.    Assessment of patient findings and chart review: no significant findings     Recommendation for patient's warfarin regimen: Continue current maintenance dose    Recommend repeat INR in 1 week  _________________________________________________________________    Radha Abbott (64 y.o.) is followed by the [facility].    Anticoagulation Summary  As of 2024      INR goal:  2.0-3.0   TTR:  64.8% (5.1 mo)   INR used for dosin.9 (2024)   Warfarin maintenance plan:  2.5 mg (2.5 mg x 1) every day   Weekly warfarin total:  17.5 mg   Plan last modified:  Jesenia Jensen, PharmD (2024)   Next INR check:  10/3/2024   Target end date:      Indications    LVAD (left ventricular assist device) present [Z95.811]                 Anticoagulation Episode Summary       INR check location:      Preferred lab:      Send INR reminders to:  [facility] COUMADIN LVAD    Comments:  [facility]  ( [phone], fax [phone])  // [employer]          Anticoagulation Care Providers       Provider Role Specialty Phone number    Sajan Hurley MD [facility] Cardiology [phone]

## 2024-09-26 NOTE — LETTER
September 26, 2024        Kevin Franklin  [address] [place] [address]  Phone: [phone]  Fax: [phone]             [place] [facility]  [address]  [facility] [address]  Phone: [phone]   Patient: Radha Abbott   MR Number: [id number]   YOB: 1960   Date of Visit: 9/26/2024       Dear Dr. Kevin Franklin    Thank you for referring Radha Abbott to me for evaluation. Attached you will find relevant portions of my assessment and plan of care.    If you have questions, please do not hesitate to call me. I look forward to following Radha Abbott along with you.    Sincerely,    Brayan Ocampo MD    Enclosure    If you would like to receive this communication electronically, please contact [email or web address] or [phone] to request [employer] Link access.    [employer] Link is a tool which provides read-only access to select patient information with whom you have a relationship. Its easy to use and provides real time access to review your patients record including encounter summaries, notes, results, and demographic information.    If you feel you have received this communication in error or would no longer like to receive these types of communications, please e-mail [email or web address]

## 2024-10-03 ENCOUNTER — TELEPHONE (OUTPATIENT)
Dept: TRANSPLANT | Facility: CLINIC | Age: 64
End: 2024-10-03
Payer: MEDICAID

## 2024-10-03 ENCOUNTER — LAB VISIT (OUTPATIENT)
Dept: LAB | Facility: HOSPITAL | Age: 64
End: 2024-10-03
Payer: MEDICAID

## 2024-10-03 ENCOUNTER — ANTI-COAG VISIT (OUTPATIENT)
Dept: CARDIOLOGY | Facility: CLINIC | Age: 64
End: 2024-10-03
Payer: MEDICAID

## 2024-10-03 DIAGNOSIS — I13.2 MALIGNANT HYPERTENSION WITH HEART FAILURE AND END-STAGE RENAL DIS: Primary | ICD-10-CM

## 2024-10-03 DIAGNOSIS — N18.6 MALIGNANT HYPERTENSION WITH HEART FAILURE AND END-STAGE RENAL DIS: Primary | ICD-10-CM

## 2024-10-03 DIAGNOSIS — Z95.811 LVAD (LEFT VENTRICULAR ASSIST DEVICE) PRESENT: Primary | ICD-10-CM

## 2024-10-03 DIAGNOSIS — I50.43 ACUTE ON CHRONIC COMBINED SYSTOLIC AND DIASTOLIC HEART FAILURE: ICD-10-CM

## 2024-10-03 LAB
ALBUMIN SERPL BCP-MCNC: 2.8 G/DL (ref 3.5–5.2)
ALP SERPL-CCNC: 99 U/L (ref 55–135)
ALT SERPL W/O P-5'-P-CCNC: 18 U/L (ref 10–44)
ANION GAP SERPL CALC-SCNC: 10 MMOL/L (ref 8–16)
AST SERPL-CCNC: 20 U/L (ref 10–40)
BASOPHILS # BLD AUTO: 0.04 K/UL (ref 0–0.2)
BASOPHILS NFR BLD: 0.6 % (ref 0–1.9)
BILIRUB SERPL-MCNC: 0.4 MG/DL (ref 0.1–1)
BNP SERPL-MCNC: 730 PG/ML (ref 0–99)
BUN SERPL-MCNC: 28 MG/DL (ref 8–23)
CALCIUM SERPL-MCNC: 8.5 MG/DL (ref 8.7–10.5)
CHLORIDE SERPL-SCNC: 107 MMOL/L (ref 95–110)
CO2 SERPL-SCNC: 20 MMOL/L (ref 23–29)
CREAT SERPL-MCNC: 4.5 MG/DL (ref 0.5–1.4)
DIFFERENTIAL METHOD BLD: ABNORMAL
EOSINOPHIL # BLD AUTO: 0.2 K/UL (ref 0–0.5)
EOSINOPHIL NFR BLD: 2.1 % (ref 0–8)
ERYTHROCYTE [DISTWIDTH] IN BLOOD BY AUTOMATED COUNT: 17 % (ref 11.5–14.5)
EST. GFR  (NO RACE VARIABLE): 14 ML/MIN/1.73 M^2
GLUCOSE SERPL-MCNC: 126 MG/DL (ref 70–110)
HCT VFR BLD AUTO: 36.5 % (ref 40–54)
HGB BLD-MCNC: 11 G/DL (ref 14–18)
IMM GRANULOCYTES # BLD AUTO: 0.07 K/UL (ref 0–0.04)
IMM GRANULOCYTES NFR BLD AUTO: 1 % (ref 0–0.5)
INR PPP: 2.2 (ref 0.8–1.2)
LYMPHOCYTES # BLD AUTO: 0.6 K/UL (ref 1–4.8)
LYMPHOCYTES NFR BLD: 8.3 % (ref 18–48)
MAGNESIUM SERPL-MCNC: 1.8 MG/DL (ref 1.6–2.6)
MCH RBC QN AUTO: 26.9 PG (ref 27–31)
MCHC RBC AUTO-ENTMCNC: 30.1 G/DL (ref 32–36)
MCV RBC AUTO: 89 FL (ref 82–98)
MONOCYTES # BLD AUTO: 0.5 K/UL (ref 0.3–1)
MONOCYTES NFR BLD: 6.9 % (ref 4–15)
NEUTROPHILS # BLD AUTO: 5.8 K/UL (ref 1.8–7.7)
NEUTROPHILS NFR BLD: 81.1 % (ref 38–73)
NRBC BLD-RTO: 0 /100 WBC
PLATELET # BLD AUTO: 267 K/UL (ref 150–450)
PMV BLD AUTO: 10.1 FL (ref 9.2–12.9)
POTASSIUM SERPL-SCNC: 4.1 MMOL/L (ref 3.5–5.1)
PROT SERPL-MCNC: 7.2 G/DL (ref 6–8.4)
PROTHROMBIN TIME: 23.7 SEC (ref 9–12.5)
RBC # BLD AUTO: 4.09 M/UL (ref 4.6–6.2)
SODIUM SERPL-SCNC: 137 MMOL/L (ref 136–145)
WBC # BLD AUTO: 7.12 K/UL (ref 3.9–12.7)

## 2024-10-03 PROCEDURE — 83880 ASSAY OF NATRIURETIC PEPTIDE: CPT | Performed by: INTERNAL MEDICINE

## 2024-10-03 PROCEDURE — 93793 ANTICOAG MGMT PT WARFARIN: CPT | Mod: ,,,

## 2024-10-03 PROCEDURE — 80053 COMPREHEN METABOLIC PANEL: CPT | Performed by: INTERNAL MEDICINE

## 2024-10-03 PROCEDURE — 83735 ASSAY OF MAGNESIUM: CPT | Performed by: INTERNAL MEDICINE

## 2024-10-03 PROCEDURE — 85025 COMPLETE CBC W/AUTO DIFF WBC: CPT | Performed by: INTERNAL MEDICINE

## 2024-10-03 PROCEDURE — 36415 COLL VENOUS BLD VENIPUNCTURE: CPT | Performed by: INTERNAL MEDICINE

## 2024-10-03 PROCEDURE — 85610 PROTHROMBIN TIME: CPT | Performed by: INTERNAL MEDICINE

## 2024-10-03 NOTE — TELEPHONE ENCOUNTER
Called  back regarding labs. Since patient is completing dialysis, we are not needing additional labs at this time. Patient only going to have PT/INR moving forward and spot check labs as needed.

## 2024-10-03 NOTE — PROGRESS NOTES
[facility]    10/03/2024 9:26 AM    Assessment/Plan:    Patient presents today with therapeutic INR.    Assessment of patient findings and chart review: no significant findings     Recommendation for patient's warfarin regimen: Continue current maintenance dose    Recommend repeat INR in 1 week  _________________________________________________________________    Radha Abbott (64 y.o.) is followed by the [facility].    Anticoagulation Summary  As of 10/3/2024      INR goal:  2.0-3.0   TTR:  66.3% (5.3 mo)   INR used for dosin.2 (10/3/2024)   Warfarin maintenance plan:  2.5 mg (2.5 mg x 1) every day   Weekly warfarin total:  17.5 mg   Plan last modified:  Jesenia Jensen, PharmD (2024)   Next INR check:  10/10/2024   Target end date:  --    Indications    LVAD (left ventricular assist device) present [Z95.811]                 Anticoagulation Episode Summary       INR check location:  --    Preferred lab:  --    Send INR reminders to:  [facility] COUMADIN LVAD    Comments:  [facility]  ( [phone], fax [phone])  // [facility]          Anticoagulation Care Providers       Provider Role Specialty Phone number    Sajan Hurley MD [facility] Cardiology [phone]

## 2024-10-10 ENCOUNTER — ANTI-COAG VISIT (OUTPATIENT)
Dept: CARDIOLOGY | Facility: CLINIC | Age: 64
End: 2024-10-10
Payer: MEDICAID

## 2024-10-10 ENCOUNTER — LAB VISIT (OUTPATIENT)
Dept: LAB | Facility: HOSPITAL | Age: 64
End: 2024-10-10
Payer: MEDICAID

## 2024-10-10 DIAGNOSIS — Z95.811 LVAD (LEFT VENTRICULAR ASSIST DEVICE) PRESENT: Primary | ICD-10-CM

## 2024-10-10 DIAGNOSIS — Z95.811 HEART REPLACED BY HEART ASSIST DEVICE: Primary | ICD-10-CM

## 2024-10-10 LAB
INR PPP: 2.6 (ref 0.8–1.2)
PROTHROMBIN TIME: 28.4 SEC (ref 9–12.5)

## 2024-10-10 PROCEDURE — 36415 COLL VENOUS BLD VENIPUNCTURE: CPT | Performed by: INTERNAL MEDICINE

## 2024-10-10 PROCEDURE — 93793 ANTICOAG MGMT PT WARFARIN: CPT | Mod: ,,,

## 2024-10-10 PROCEDURE — 85610 PROTHROMBIN TIME: CPT | Performed by: INTERNAL MEDICINE

## 2024-10-10 NOTE — PROGRESS NOTES
[facility]    10/10/2024 9:14 AM    Assessment/Plan:    Patient presents today with therapeutic INR.    Assessment of patient findings and chart review: no significant findings     Recommendation for patient's warfarin regimen: Continue current maintenance dose    Recommend repeat INR in 1 week  _________________________________________________________________    Radha Abbott (64 y.o.) is followed by the [email or web address].    Anticoagulation Summary  As of 10/10/2024      INR goal:  2.0-3.0   TTR:  67.8% (5.5 mo)   INR used for dosin.6 (10/10/2024)   Warfarin maintenance plan:  2.5 mg (2.5 mg x 1) every day   Weekly warfarin total:  17.5 mg   Plan last modified:  Jesenia Jensen, PharmD (2024)   Next INR check:  10/17/2024   Target end date:  --    Indications    LVAD (left ventricular assist device) present [Z95.811]                 Anticoagulation Episode Summary       INR check location:  --    Preferred lab:  --    Send INR reminders to:  [facility] COUMADIN LVAD    Comments:  [facility]  ( [phone], fax [phone])  // [employer]          Anticoagulation Care Providers       Provider Role Specialty Phone number    Sajan Hurley MD [facility] Cardiology [phone]

## 2024-10-12 NOTE — ASSESSMENT & PLAN NOTE
I informed patient about diagnostic breast ultrasound results - multiple cysts in both breast, stable comparing to previous exam.  Repeat diagnostic ultrasound of both breasts in 6 months was recommended.  Also she is due for screening mammography after 10/18/204 - patient declined at this time, prefers to have it done together with next ultrasound in 6 months.  Orders were placed.   IVÁN on CKD2. Baseline Cr of 1.7-2.0.   - Hold ARNi  -Trend BMPs daily   -Nephrology following   -SLED/SCUF for volume removal began on 12/1. He is anuric  -Did not respond to diuretic challenge with 240 mg IV Lasix, 1000 mg Diuril, and 500 mg IV Diamox done 12/14  -Secure chat sent to Nephrology to discuss appropriate timing of HD trial as well as replacing RIJ trialysis cath with a tunneled cath

## 2024-10-14 ENCOUNTER — TELEPHONE (OUTPATIENT)
Dept: TRANSPLANT | Facility: CLINIC | Age: 64
End: 2024-10-14
Payer: MEDICAID

## 2024-10-14 NOTE — TELEPHONE ENCOUNTER
Arlyn called to report he had something in his rectum, went to PCP and was given hemorrhoid cream. The cream isn't helping so Arlyn looked with a flashlight and saw a boil.  She also started giving him left over abx amoxicillin.  I told her NOT to give any abx unless prescribed for this situation only.  I asked her to contact the PCP again, let that person know there is no relief and see if maybe something else is warranted. Explained to PCP what she saw. I also said I would be hesitant to allow anyone to ramón it, maybe oral abx are required.

## 2024-10-15 ENCOUNTER — TELEPHONE (OUTPATIENT)
Dept: TRANSPLANT | Facility: CLINIC | Age: 64
End: 2024-10-15
Payer: MEDICAID

## 2024-10-15 DIAGNOSIS — Z95.811 LVAD (LEFT VENTRICULAR ASSIST DEVICE) PRESENT: Primary | ICD-10-CM

## 2024-10-15 NOTE — TELEPHONE ENCOUNTER
Patient's wife called reporting that they saw their PCP for what they suspected was hemorrhoids, per PCP it was not and they referred patient to GI, patient can only be seen by GI at [facility] due to LVAD and potential need for scope procedure, referral placed. Wife also reported that patient was prescribed doxycycline as well. Advised to report that information to the coumadin clinic.

## 2024-10-16 ENCOUNTER — HOSPITAL ENCOUNTER (EMERGENCY)
Facility: HOSPITAL | Age: 64
Discharge: HOME | End: 2024-10-17
Attending: EMERGENCY MEDICINE
Payer: MEDICAID

## 2024-10-16 DIAGNOSIS — K61.0 PERIANAL ABSCESS: Primary | ICD-10-CM

## 2024-10-16 LAB
ALBUMIN SERPL BCP-MCNC: 2.5 G/DL (ref 3.5–5.2)
ALP SERPL-CCNC: 95 U/L (ref 55–135)
ALT SERPL W/O P-5'-P-CCNC: 29 U/L (ref 10–44)
ANION GAP SERPL CALC-SCNC: 11 MMOL/L (ref 8–16)
AST SERPL-CCNC: 32 U/L (ref 10–40)
BASOPHILS # BLD AUTO: 0.03 K/UL (ref 0–0.2)
BASOPHILS NFR BLD: 0.4 % (ref 0–1.9)
BILIRUB SERPL-MCNC: 0.3 MG/DL (ref 0.1–1)
BUN SERPL-MCNC: 20 MG/DL (ref 8–23)
CALCIUM SERPL-MCNC: 8.7 MG/DL (ref 8.7–10.5)
CHLORIDE SERPL-SCNC: 104 MMOL/L (ref 95–110)
CO2 SERPL-SCNC: 22 MMOL/L (ref 23–29)
CREAT SERPL-MCNC: 3.7 MG/DL (ref 0.5–1.4)
DIFFERENTIAL METHOD BLD: ABNORMAL
EOSINOPHIL # BLD AUTO: 0.1 K/UL (ref 0–0.5)
EOSINOPHIL NFR BLD: 1.5 % (ref 0–8)
ERYTHROCYTE [DISTWIDTH] IN BLOOD BY AUTOMATED COUNT: 16.5 % (ref 11.5–14.5)
EST. GFR  (NO RACE VARIABLE): 17.7 ML/MIN/1.73 M^2
GLUCOSE SERPL-MCNC: 166 MG/DL (ref 70–110)
HCT VFR BLD AUTO: 36.8 % (ref 40–54)
HGB BLD-MCNC: 11.4 G/DL (ref 14–18)
IMM GRANULOCYTES # BLD AUTO: 0.06 K/UL (ref 0–0.04)
IMM GRANULOCYTES NFR BLD AUTO: 0.7 % (ref 0–0.5)
INR PPP: 3.2 (ref 0.8–1.2)
LYMPHOCYTES # BLD AUTO: 0.6 K/UL (ref 1–4.8)
LYMPHOCYTES NFR BLD: 6.8 % (ref 18–48)
MCH RBC QN AUTO: 26.9 PG (ref 27–31)
MCHC RBC AUTO-ENTMCNC: 31 G/DL (ref 32–36)
MCV RBC AUTO: 87 FL (ref 82–98)
MONOCYTES # BLD AUTO: 0.7 K/UL (ref 0.3–1)
MONOCYTES NFR BLD: 8.3 % (ref 4–15)
NEUTROPHILS # BLD AUTO: 6.9 K/UL (ref 1.8–7.7)
NEUTROPHILS NFR BLD: 82.3 % (ref 38–73)
NRBC BLD-RTO: 0 /100 WBC
PLATELET # BLD AUTO: 301 K/UL (ref 150–450)
PMV BLD AUTO: 9.3 FL (ref 9.2–12.9)
POTASSIUM SERPL-SCNC: 3.5 MMOL/L (ref 3.5–5.1)
PROT SERPL-MCNC: 7.2 G/DL (ref 6–8.4)
PROTHROMBIN TIME: 32.2 SEC (ref 9–12.5)
RBC # BLD AUTO: 4.24 M/UL (ref 4.6–6.2)
SODIUM SERPL-SCNC: 137 MMOL/L (ref 136–145)
WBC # BLD AUTO: 8.43 K/UL (ref 3.9–12.7)

## 2024-10-16 PROCEDURE — 63600175 PHARM REV CODE 636 W HCPCS: Performed by: EMERGENCY MEDICINE

## 2024-10-16 PROCEDURE — 96375 TX/PRO/DX INJ NEW DRUG ADDON: CPT

## 2024-10-16 PROCEDURE — 96374 THER/PROPH/DIAG INJ IV PUSH: CPT

## 2024-10-16 PROCEDURE — 63600175 PHARM REV CODE 636 W HCPCS: Performed by: STUDENT IN AN ORGANIZED HEALTH CARE EDUCATION/TRAINING PROGRAM

## 2024-10-16 PROCEDURE — 96376 TX/PRO/DX INJ SAME DRUG ADON: CPT

## 2024-10-16 PROCEDURE — 80053 COMPREHEN METABOLIC PANEL: CPT | Performed by: EMERGENCY MEDICINE

## 2024-10-16 PROCEDURE — 85025 COMPLETE CBC W/AUTO DIFF WBC: CPT | Performed by: EMERGENCY MEDICINE

## 2024-10-16 PROCEDURE — 85610 PROTHROMBIN TIME: CPT | Performed by: STUDENT IN AN ORGANIZED HEALTH CARE EDUCATION/TRAINING PROGRAM

## 2024-10-16 PROCEDURE — 99285 EMERGENCY DEPT VISIT HI MDM: CPT | Mod: 25

## 2024-10-16 RX ORDER — MORPHINE SULFATE 4 MG/ML
4 INJECTION, SOLUTION INTRAMUSCULAR; INTRAVENOUS
Status: COMPLETED | OUTPATIENT
Start: 2024-10-16 | End: 2024-10-16

## 2024-10-16 RX ORDER — ONDANSETRON HYDROCHLORIDE 2 MG/ML
4 INJECTION, SOLUTION INTRAVENOUS
Status: COMPLETED | OUTPATIENT
Start: 2024-10-16 | End: 2024-10-16

## 2024-10-16 RX ORDER — MORPHINE SULFATE 2 MG/ML
6 INJECTION, SOLUTION INTRAMUSCULAR; INTRAVENOUS
Status: COMPLETED | OUTPATIENT
Start: 2024-10-16 | End: 2024-10-16

## 2024-10-16 RX ADMIN — ONDANSETRON 4 MG: 2 INJECTION INTRAMUSCULAR; INTRAVENOUS at 08:10

## 2024-10-16 RX ADMIN — MORPHINE SULFATE 4 MG: 4 INJECTION INTRAVENOUS at 09:10

## 2024-10-16 RX ADMIN — MORPHINE SULFATE 6 MG: 2 INJECTION, SOLUTION INTRAMUSCULAR; INTRAVENOUS at 08:10

## 2024-10-17 ENCOUNTER — TELEPHONE (OUTPATIENT)
Dept: SURGERY | Facility: CLINIC | Age: 64
End: 2024-10-17
Payer: MEDICAID

## 2024-10-17 ENCOUNTER — ANTI-COAG VISIT (OUTPATIENT)
Dept: CARDIOLOGY | Facility: CLINIC | Age: 64
End: 2024-10-17
Payer: MEDICAID

## 2024-10-17 VITALS
BODY MASS INDEX: 23.2 KG/M2 | RESPIRATION RATE: 18 BRPM | WEIGHT: 175.06 LBS | SYSTOLIC BLOOD PRESSURE: 96 MMHG | DIASTOLIC BLOOD PRESSURE: 55 MMHG | TEMPERATURE: 99 F | OXYGEN SATURATION: 95 % | HEART RATE: 81 BPM | HEIGHT: 73 IN

## 2024-10-17 DIAGNOSIS — Z95.811 LVAD (LEFT VENTRICULAR ASSIST DEVICE) PRESENT: Primary | ICD-10-CM

## 2024-10-17 PROCEDURE — 46050 I&D PERIANAL ABSCESS SUPFC: CPT

## 2024-10-17 PROCEDURE — 93793 ANTICOAG MGMT PT WARFARIN: CPT | Mod: ,,,

## 2024-10-17 PROCEDURE — 87075 CULTR BACTERIA EXCEPT BLOOD: CPT | Performed by: STUDENT IN AN ORGANIZED HEALTH CARE EDUCATION/TRAINING PROGRAM

## 2024-10-17 PROCEDURE — 96375 TX/PRO/DX INJ NEW DRUG ADDON: CPT

## 2024-10-17 PROCEDURE — 63600175 PHARM REV CODE 636 W HCPCS: Performed by: STUDENT IN AN ORGANIZED HEALTH CARE EDUCATION/TRAINING PROGRAM

## 2024-10-17 PROCEDURE — 87076 CULTURE ANAEROBE IDENT EACH: CPT | Performed by: STUDENT IN AN ORGANIZED HEALTH CARE EDUCATION/TRAINING PROGRAM

## 2024-10-17 RX ORDER — OXYCODONE HYDROCHLORIDE 5 MG/1
5 TABLET ORAL EVERY 4 HOURS PRN
Qty: 8 TABLET | Refills: 0 | Status: SHIPPED | OUTPATIENT
Start: 2024-10-17

## 2024-10-17 RX ORDER — DOXYCYCLINE 100 MG/1
100 CAPSULE ORAL 2 TIMES DAILY
Qty: 14 CAPSULE | Refills: 0 | Status: SHIPPED | OUTPATIENT
Start: 2024-10-17 | End: 2024-10-24

## 2024-10-17 RX ORDER — CEFTRIAXONE 1 G/1
1 INJECTION, POWDER, FOR SOLUTION INTRAMUSCULAR; INTRAVENOUS ONCE
Status: COMPLETED | OUTPATIENT
Start: 2024-10-17 | End: 2024-10-17

## 2024-10-17 RX ADMIN — CEFTRIAXONE 1 G: 1 INJECTION, POWDER, FOR SOLUTION INTRAMUSCULAR; INTRAVENOUS at 06:10

## 2024-10-17 NOTE — PROGRESS NOTES
Arlyn reports correct Warfarin dose, started Doxycycline 10/16/24 and has 6 days left, diarrhea that has resolved, no other changes reported.

## 2024-10-17 NOTE — PROGRESS NOTES
[facility]    10/17/2024 10:41 AM    Assessment/Plan:    Patient presents today with supratherapeutic INR.    Assessment of patient findings and chart review: Started 7 day course of doxycycline yesterday (after INR drawn)    Recommendation for patient's warfarin regimen: Lower dose today to 1.25mg then resume current maintenance dose    Recommend repeat INR Monday  _________________________________________________________________    [facility] (64 y.o.) is followed by the [facility].    Anticoagulation Summary  As of 10/17/2024      INR goal:  2.0-3.0   TTR:  67.7% (5.8 mo)   INR used for dosing:  3.2 (10/16/2024)   Warfarin maintenance plan:  2.5 mg (2.5 mg x 1) every day   Weekly warfarin total:  17.5 mg   Plan last modified:  Jesenia Jensen, PharmD (7/26/2024)   Next INR check:  10/21/2024   Target end date:  --    Indications    LVAD (left ventricular assist device) present [Z95.811]                 Anticoagulation Episode Summary       INR check location:  --    Preferred lab:  --    Send INR reminders to:  [facility] COUMADIN LVAD    Comments:  [facility] ( [phone], fax [phone])  // [employer]          Anticoagulation Care Providers       Provider Role Specialty Phone number    Sajan Hurley MD [facility] Cardiology [phone]

## 2024-10-17 NOTE — CONSULTS
[facility] - Emergency Dept  Colorectal Surgery  Consult Note    Patient Name: Radha Abbott  MRN: [id number]  Admission Date: 10/16/2024  Hospital Length of Stay: 0 days  Attending Physician: No att. providers found  Primary Care Provider: Vasu Kong MD    Inpatient consult to Colorectal Surgery  Consult performed by: Philip Shea MD  Consult ordered by: Christa Mcconnell MD        Subjective:     Chief Complaint/Reason for Admission: Perianal abscess    History of Present Illness: 64 M w/ an LVAD on warfarin 2/2 cardiomyopathy/HF presented to the ED with 7 days of perianal fullness and pain. He tried warm compresses without relief. Denies constitutional symptoms. In the ED, workup included a CT scan, which revealed a 3cm perianal/gluteal abscess, for which CRS was called for further management.     Currently, the patient is comfortable, although reports some perianal pain.     No current facility-administered medications for this encounter.     Current Outpatient Medications   Medication Sig    amiodarone (PACERONE) 400 MG tablet Take 1 tablet (400 mg total) by mouth once daily.    atorvastatin (LIPITOR) 40 MG tablet Take 1 tablet (40 mg total) by mouth every evening.    gabapentin (NEURONTIN) 300 MG capsule Take 300 mg by mouth every evening.    omega-3 acid ethyl esters (LOVAZA) 1 gram capsule Take 2 capsules (2 g total) by mouth 2 (two) times daily.    pantoprazole (PROTONIX) 40 MG tablet Take 1 tablet (40 mg total) by mouth once daily.    pulse oximeter (PULSE OXIMETER) device by Apply Externally route 2 (two) times a day. Use twice daily at 8 AM and 3 PM and record the value in [facility] as directed.    senna-docusate 8.6-50 mg (PERICOLACE) 8.6-50 mg per tablet Take 2 tablets by mouth daily as needed for Constipation.    traZODone (DESYREL) 50 MG tablet Take 0.5 tablets (25 mg total) by mouth every evening.    venlafaxine (EFFEXOR) 37.5 MG Tab Take 1 tablet (37.5 mg total) by mouth once daily.     vitamin D (VITAMIN D3) 1000 units Tab Take 1 tablet (1,000 Units total) by mouth once daily.    warfarin (COUMADIN) 2.5 MG tablet Take 1 tablet (2.5 mg total) by mouth Daily.       Review of patient's allergies indicates:  No Known Allergies    Past Medical History:   Diagnosis Date    Aspiration pneumonia of both lungs 06/17/2024    CAD (coronary artery disease)     CHF (congestive heart failure)     Delirium 06/16/2024    Diabetes mellitus     HFrEF (heart failure with reduced ejection fraction)     Hypoglycemia 06/12/2024    ICD (implantable cardioverter-defibrillator) in place     LVAD (left ventricular assist device) present 12/01/2023    MI, old     PAF (paroxysmal atrial fibrillation) 10/11/2023    Stage 4 chronic kidney disease 02/15/2024    Type 2 diabetes mellitus without complication, without long-term current use of insulin 10/07/2023     Past Surgical History:   Procedure Laterality Date    ANGIOPLASTY-VENOUS ARTERY Right 12/1/2023    Procedure: ANGIOPLASTY-VENOUS ARTERY, RIGHT FEMORAL;  Surgeon: Yuri Washington MD;  [facility];  Service: Cardiovascular;  Laterality: Right;    AORTIC VALVULOPLASTY N/A 12/1/2023    Procedure: REPAIR, AORTIC VALVE;  Surgeon: Yuri Washington MD;  Location: [facility] OR 2ND FLR;  Service: Cardiovascular;  Laterality: N/A;    CARDIAC SURGERY      CLOSURE N/A 12/1/2023    Procedure: CLOSURE, TEMPORARY;  Surgeon: Yuri Washington MD;  Location: [facility] OR 2ND FLR;  Service: Cardiovascular;  Laterality: N/A;    DRAINAGE OF PLEURAL EFFUSION  12/4/2023    Procedure: DRAINAGE, PLEURAL EFFUSION;  Surgeon: Yuri Washington MD;  Location: [facility] OR 2ND FLR;  Service: Cardiovascular;;    INSERTION OF GRAFT TO PERICARDIUM  12/4/2023    Procedure: INSERTION, GRAFT, PERICARDIUM;  Surgeon: Yuri Washington MD;  Location: [facility] OR [facility] FLR;  Service: Cardiovascular;;    INSERTION OF INTRA-AORTIC BALLOON ASSIST DEVICE Right 11/21/2023    Procedure: INSERTION, INTRA-AORTIC BALLOON PUMP;   Surgeon: Finn Cohn MD;  Location: [facility] CATH LAB;  Service: Cardiology;  Laterality: Right;    LEFT VENTRICULAR ASSIST DEVICE Left 12/1/2023    Procedure: INSERTION-LEFT VENTRICULAR ASSIST DEVICE;  Surgeon: Yuri Washington MD;  Location: [facility] OR 2ND FLR;  Service: Cardiovascular;  Laterality: Left;  REDO STERNOTOMY - REDO SAW NEEDED FOR CASE    LYSIS OF ADHESIONS  12/1/2023    Procedure: LYSIS, ADHESIONS;  Surgeon: Yuri Washington MD;  Location: [facility] OR [facility] FLR;  Service: Cardiovascular;;    PLACEMENT OF SWAN ROLANDO CATHETER WITH IMAGING GUIDANCE  11/20/2023    Procedure: INSERTION, CATHETER, SWAN-ROLANDO, WITH IMAGING GUIDANCE;  Surgeon: Sajan Hurley MD;  Location: [facility] CATH LAB;  Service: Cardiology;;    REMOVAL OF TUNNELED CENTRAL VENOUS CATHETER (CVC) N/A 3/1/2024    Procedure: REMOVAL, CATHETER, CENTRAL VENOUS, TUNNELED;  Surgeon: Seble Aguilar MD;  Location: [facility] CATH LAB;  Service: Interventional Nephrology;  Laterality: N/A;    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 12/1/2023    Procedure: REPAIR, ANEURYSM, ARTERY, FEMORAL;  Surgeon: Yuri Washington MD;  [facility];  Service: Cardiovascular;  Laterality: Right;  Right Femoral Artery Repair    RIGHT HEART CATHETERIZATION Right 10/10/2023    Procedure: INSERTION, CATHETER, RIGHT HEART;  Surgeon: Bin Gandhi MD;  Location: [facility] CATH LAB;  Service: Cardiology;  Laterality: Right;    RIGHT HEART CATHETERIZATION Right 10/13/2023    Procedure: INSERTION, CATHETER, RIGHT HEART;  Surgeon: Walter Mcintyre MD;  Location: [facility] CATH LAB;  Service: Cardiology;  Laterality: Right;    RIGHT HEART CATHETERIZATION  11/13/2023    RIGHT HEART CATHETERIZATION Right 11/13/2023    Procedure: INSERTION, CATHETER, RIGHT HEART;  Surgeon: Juventino Bermudez Jr., MD;  Location: [facility] CATH LAB;  Service: Cardiology;  Laterality: Right;    RIGHT HEART CATHETERIZATION Right 11/20/2023    Procedure: INSERTION, CATHETER, RIGHT HEART;  Surgeon: Gema  Sajan Casas MD;  Location: [facility] CATH LAB;  Service: Cardiology;  Laterality: Right;    RIGHT HEART CATHETERIZATION Right 1/22/2024    Procedure: INSERTION, CATHETER, RIGHT HEART;  Surgeon: Brayan Ocampo MD;  Location: [facility] CATH LAB;  Service: Cardiology;  Laterality: Right;    STERNAL WOUND CLOSURE N/A 12/4/2023    Procedure: CLOSURE, WOUND, STERNUM;  Surgeon: Yuri Washington MD;  Location: [facility] OR [facility] FLR;  Service: Cardiovascular;  Laterality: N/A;    STERNOTOMY N/A 12/1/2023    Procedure: STERNOTOMY, REDO;  Surgeon: Yuri Washington MD;  Location: [facility] OR [facility] FLR;  Service: Cardiovascular;  Laterality: N/A;    VALVULOPLASTY, MITRAL VALVE N/A 12/1/2023    Procedure: VALVULOPLASTY, MITRAL VALVE;  Surgeon: Yuri Washington MD;  [facility];  Service: Cardiovascular;  Laterality: N/A;     Family History    None       Tobacco Use    Smoking status: Former     Current packs/day: 0.50     Types: Cigarettes    Smokeless tobacco: Not on file   Substance and Sexual Activity    Alcohol use: Yes     Comment: rarely    Drug use: No    Sexual activity: Not Currently     Partners: Female     Constitutional: No Weight Change, No Fever, No Chills, No Night Sweats, No Fatigue, No Malaise    ENT/Mouth: No Hearing Changes, No Ear Pain, No Nasal Congestion, No Sinus Pain, No Hoarseness, No sore throat, No Rhinorrhea, No Swallowing Difficulty    Eyes: No Eye Pain, No Swelling, No Redness, No Foreign Body, No Discharge, No Vision Changes    Cardiovascular: No Chest Pain, No SOB, No PND, No Dyspnea on Exertion, No Orthopnea, No Claudication, No Edema, No Palpitations    Respiratory: No Cough, No Sputum, No Wheezing, No Smoke Exposure, No Dyspnea    Gastrointestinal: No Nausea, No Vomiting, No Diarrhea, No Constipation, No Pain, No Heartburn,   Genitourinary: No Dysmenorrhea, No DUB, No Dyspareunia, No Dysuria, No Urinary Frequency, No Hematuria, No Urinary Incontinence, No Urgency, No Flank Pain, No Urinary Flow  Changes, No Hesitancy    Musculoskeletal: No Arthralgias, No Myalgias, No Joint Swelling, No Joint Stiffness, No Back Pain, No Neck Pain, No Injury History    Skin: No Skin Lesions, No Pruritis, No Hair Changes, No Breast/Skin Changes, No Nipple Discharge    Neuro: No Weakness, No Numbness, No Paresthesias, No Loss of Consciousness, No Syncope, No Dizziness, No Headache, No Coordination Changes, No Recent Falls    Psych: No Anxiety/Panic, No Depression, No Insomnia, No Personality Changes, No Delusions, No Rumination, No SI/HI/AH/VH, No Social Issues, No Memory Changes, No Violence/Abuse Hx.,   Heme/Lymph: No Bruising, No Bleeding, No Transfusions History, No Lymphadenopathy    Endocrine: No Polyuria, No Polydipsia, No Temperature Intolerance     Objective:     Vital Signs (Most Recent):  Temp: 98.9 °F (37.2 °C) (10/17/24 0311)  Pulse: 86 (10/17/24 0400)  Resp: 18 (10/17/24 0400)  BP: 94/68 (10/17/24 0401)  SpO2: 95 % (10/17/24 0400) Vital Signs (24h Range):  Temp:  [98.9 °F (37.2 °C)-100 °F (37.8 °C)] 98.9 °F (37.2 °C)  Pulse:  [85-98] 86  Resp:  [13-19] 18  SpO2:  [94 %-100 %] 95 %  BP: ()/(0-84) 94/68     Weight: 79.4 kg (175 lb 0.7 oz)  Body mass index is 23.09 kg/m².    Gen: WD/WN in NAD  HEENT: MMM, Sclera anicteric   Chest: Normocardic, normotensive, bilateral chest rise  Abd: Soft, nontender, nondistended, no rebound or guarding, no signs of generalized peritonitis  Ext: No pitting edema noted       Anorectal Exam:  Anal Skin:  area of induration and fluctuance at left posterior anus/glut    Digital Rectal Exam:  Resting Tone normal  Squeeze normal    Significant Labs:  BMP (Last 3 Results):   Recent Labs   Lab 10/16/24  2009   *      K 3.5      CO2 22*   BUN 20   CREATININE 3.7*   CALCIUM 8.7     CBC (Last 3 Results):   Recent Labs   Lab 10/16/24  2009   WBC 8.43   RBC 4.24*   HGB 11.4*   HCT 36.8*      MCV 87   MCH 26.9*   MCHC 31.0*     CMP (Last 3 Results):   Recent Labs    Lab 10/16/24  2009   *   CALCIUM 8.7   ALBUMIN 2.5*   PROT 7.2      K 3.5   CO2 22*      BUN 20   CREATININE 3.7*   ALKPHOS 95   ALT 29   AST 32   BILITOT 0.3       Significant Diagnostics:  Results for orders placed or performed during the hospital encounter of 10/16/24 (from the past 2160 hours)   CT Pelvis Without Contrast    Narrative    EXAMINATION:  CT PELVIS WITHOUT CONTRAST    CLINICAL HISTORY:  Anal/rectal abscess;    TECHNIQUE:  Axial images of the pelvis were obtained without the administration of intravenous contrast.  Coronal and sagittal reformats were also obtained.    COMPARISON:  CT abdomen pelvis 06/11/2024    FINDINGS:  BOWEL/MESENTERY: No evidence of bowel obstruction or inflammatory process.    BLADDER/REPRODUCTIVE: No bladder wall thickening.  Borderline prostate enlargement.    VASCULATURE: Moderate calcified atherosclerosis.    BONES: No acute fracture or bony destructive process.    SOFT TISSUES: There is a fluid collection along the left gluteal cleft measuring 3.9 x 2.9 cm.    OTHER: No pelvic adenopathy, free fluid, or mass.      Impression    Fluid collection along the left gluteal cleft soft tissues measuring up to 3.9 cm, compatible with anorectal abscess.  Underlying neoplasm is neither confirmed nor fully excluded on this scan.  Correlate clinically.    No associated soft tissue emphysema.    No overt supralevator involvement.    Electronically signed by resident: Scooter Garcia  Date:    10/16/2024  Time:    23:59    Electronically signed by: Jake Gupta  Date:    10/17/2024  Time:    00:23         Assessment/Plan:       26M with a perianal abscess    Bedside drainage completed. Verbal consent was given. Iodine was used to prep the skin. 5cc of 1% lidocaine was infused for local analgesia. A #11 blade was used to make a 1cm eliptoid incision close to the anal margin in the area of fluctuance. 20cc of purulent material was evacuated. The wound was probed,  irrigated and packed with QuickClot strip to good effect. Patient tolerated the procedure without issue. Culture sent.    -Patient may follow up in CRS clinic with Dr. Ramon in 3-4 weeks  -Packing to be removed tomorrow in the shower, does not need to be replaced.      Thank you for your consult. I will sign off. Please contact us if you have any additional questions.    Philip hSea MD  Colorectal Surgery  [facility] - Emergency Dept

## 2024-10-17 NOTE — PROGRESS NOTES
I received this patient in changeover.  Briefly, patient is a 64-year-old male with history of heart failure status post LVAD.  He presents today for perianal pain and swelling that started 1 week ago.  Exam performed by my previous colleague with area of perianal fluctuance and tenderness.  Labs reviewed.  Creatinine at baseline.  No leukocytosis.  CT pending to assess for deep space infection and perirectal abscess.  I have also added on PT/INR in case I&D is required.  Dispo pending CT.    CT reviewed.  See impression below.  Will discuss with Colorectal surgery.    CT Impression:     Fluid collection along the left gluteal cleft soft tissues measuring up to 3.9 cm, compatible with anorectal abscess.  Underlying neoplasm is neither confirmed nor fully excluded on this scan.  Correlate clinically.     No associated soft tissue emphysema.     No overt supralevator involvement.    Discussed with colorectal surgery.  They plan to bedside I and D the abscess first thing in the morning.  Will continue to hold in the emergency department until that time.    Colorectal surgery able to come to bedside and perform I&D.  They packed the wound.  They recommend observing for 1-2 hours to monitor for bleeding.  They have ordered Rocephin for him to get in the emergency department.  They recommend 1 week course of doxycycline for home which I have already prescribed.  Care transitioned to oncoming staff.  Plan for discharge home after 1-2 hours of observation post I&D.

## 2024-10-17 NOTE — TELEPHONE ENCOUNTER
Spoke with patient's wife. Appt scheduled for 1p and instructed patient to come for 12:30p per Mrs. Jean's request. Note placed in appt notes.

## 2024-10-17 NOTE — ED NOTES
LOC: The patient is awake, alert, aware of environment with an appropriate affect. Oriented x4, speaking appropriately  APPEARANCE: Pt resting comfortably, in no acute distress, pt is clean and well groomed, clothing properly fastened  SKIN:The skin is warm and dry, color consistent with ethnicity, patient has normal skin turgor and moist mucus membranes, no bruising noted. Rectal boil   RESPIRATORY:Airway is open and patent, respirations are spontaneous, patient has a normal effort and rate, no accessory muscle use noted.  CARDIAC: Normal rate and rhythm, no peripheral edema noted, capillary refill < 3 seconds, bilateral radial pulses 2+. +LVAD   ABDOMEN: Soft, non tender, non distended. Bowel sounds present x 4 quadrants.   NEUROLOGIC: PERRLA, facial expression is symmetrical, patient moving all extremities spontaneously, normal sensation in all extremities when touched with a finger.  Follows all commands appropriately  MUSCULOSKELETAL: Patient moving all extremities spontaneously, no obvious swelling or deformities noted.

## 2024-10-17 NOTE — PROVIDER PROGRESS NOTES - EMERGENCY DEPT.
Encounter Date: 10/16/2024    ED Physician Progress Notes        Physician Note:   Case signed out to me after colorectal drained perianal abscess.  Plan is to observe him for heavy bleeding since he is anticoagulated.  If stable will discharge home.  He has prescription for doxycycline.    8:02 AM  I examined the patient.  No significant bleeding to.  Has gauze there that is not saturated.  Will discharge home in stable condition.    Patient is resting comfortably and relieved that abscess has been drained.  Will also send oxycodone  to his pharmacy.

## 2024-10-17 NOTE — ED NOTES
Pt's batteries placed on wall battery charger. Pt denies wanting to be attached to wall monitor. States he would like to stay connected to his primary controller

## 2024-10-17 NOTE — ED TRIAGE NOTES
Pt presents to ED with c/o worsening pain to boil on his rectum for the last 3 days. Denies drainage or discharge. No relief of symptoms with warm compresses. Has taken 2 doses of doxy that was prescribed by his PCP 2 days ago. Pt receives dialysis T,W,Th and reports having a full session today. Denies any recent LVAD alarms. Denies chills, SOB, CP, abd pain, N/V/D, dysuria, dizziness, and diaphoresis

## 2024-10-17 NOTE — ED PROVIDER NOTES
Emergency Department Provider Note    Radha Abbott   64 y.o. male   [id number]      10/16/2024       History     This history was obtained from the patient without limitations.  He presented by personal transportation.  His wife is at the bedside.      He is a 64-year-old with the below past medical history.  He complains of perianal and pain swelling that began one week ago and has increased since onset.  The pain is sometimes improved with acetaminophen but sometimes not.  He denies fever, chills, abdominal pain, nausea, vomiting, diarrhea, constipation, headache, dizziness, chest pain, and shortness of breath.         Past Medical History:   Diagnosis Date    Aspiration pneumonia of both lungs 06/17/2024    CAD (coronary artery disease)     CHF (congestive heart failure)     Delirium 06/16/2024    Diabetes mellitus     HFrEF (heart failure with reduced ejection fraction)     Hypoglycemia 06/12/2024    ICD (implantable cardioverter-defibrillator) in place     LVAD (left ventricular assist device) present 12/01/2023    MI, old     PAF (paroxysmal atrial fibrillation) 10/11/2023    Stage 4 chronic kidney disease 02/15/2024    Type 2 diabetes mellitus without complication, without long-term current use of insulin 10/07/2023      Past Surgical History:   Procedure Laterality Date    ANGIOPLASTY-VENOUS ARTERY Right 12/1/2023    Procedure: ANGIOPLASTY-VENOUS ARTERY, RIGHT FEMORAL;  Surgeon: Yuri Washington MD;  Location: [facility] OR [address] [place] [address];  Service: Cardiovascular;  Laterality: Right;    AORTIC VALVULOPLASTY N/A 12/1/2023    Procedure: REPAIR, AORTIC VALVE;  Surgeon: Yuri Washington MD;  [facility];  Service: Cardiovascular;  Laterality: N/A;    CARDIAC SURGERY      CLOSURE N/A 12/1/2023    Procedure: CLOSURE, TEMPORARY;  Surgeon: Yuri Washington MD;  [facility];  Service: Cardiovascular;  Laterality: N/A;    DRAINAGE OF PLEURAL EFFUSION  12/4/2023    Procedure: DRAINAGE, PLEURAL EFFUSION;   Surgeon: Yuri Washington MD;  [facility];  Service: Cardiovascular;;    INSERTION OF GRAFT TO PERICARDIUM  12/4/2023    Procedure: INSERTION, GRAFT, PERICARDIUM;  Surgeon: Yuri Washington MD;  [facility];  Service: Cardiovascular;;    INSERTION OF INTRA-AORTIC BALLOON ASSIST DEVICE Right 11/21/2023    Procedure: INSERTION, INTRA-AORTIC BALLOON PUMP;  Surgeon: Finn Cohn MD;  Location: [facility] CATH LAB;  Service: Cardiology;  Laterality: Right;    LEFT VENTRICULAR ASSIST DEVICE Left 12/1/2023    Procedure: INSERTION-LEFT VENTRICULAR ASSIST DEVICE;  Surgeon: Yuri Washington MD;  [facility];  Service: Cardiovascular;  Laterality: Left;  REDO STERNOTOMY - REDO SAW NEEDED FOR CASE    LYSIS OF ADHESIONS  12/1/2023    Procedure: LYSIS, ADHESIONS;  Surgeon: Yuri Washington MD;  [facility];  Service: Cardiovascular;;    PLACEMENT OF SWAN ROLANDO CATHETER WITH IMAGING GUIDANCE  11/20/2023    Procedure: INSERTION, CATHETER, SWAN-ROLANDO, WITH IMAGING GUIDANCE;  Surgeon: Sajan Hurley MD;  Location: [facility] CATH LAB;  Service: Cardiology;;    REMOVAL OF TUNNELED CENTRAL VENOUS CATHETER (CVC) N/A 3/1/2024    Procedure: REMOVAL, CATHETER, CENTRAL VENOUS, TUNNELED;  Surgeon: Seble Aguilar MD;  Location: [facility] CATH LAB;  Service: Interventional Nephrology;  Laterality: N/A;    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 12/1/2023    Procedure: REPAIR, ANEURYSM, ARTERY, FEMORAL;  Surgeon: Yuri Washington MD;  [facility];  Service: Cardiovascular;  Laterality: Right;  Right Femoral Artery Repair    RIGHT HEART CATHETERIZATION Right 10/10/2023    Procedure: INSERTION, CATHETER, RIGHT HEART;  Surgeon: Bin Gandhi MD;  Location: [facility];  Service: Cardiology;  Laterality: Right;    RIGHT HEART CATHETERIZATION Right 10/13/2023    Procedure: INSERTION, CATHETER, RIGHT HEART;  Surgeon: Walter Mcintyre MD;  Location: [facility] CATH LAB;  Service:  Cardiology;  Laterality: Right;    RIGHT HEART CATHETERIZATION  11/13/2023    RIGHT HEART CATHETERIZATION Right 11/13/2023    Procedure: INSERTION, CATHETER, RIGHT HEART;  Surgeon: Juventino Bermudez Jr., MD;  Location: [facility] CATH LAB;  Service: Cardiology;  Laterality: Right;    RIGHT HEART CATHETERIZATION Right 11/20/2023    Procedure: INSERTION, CATHETER, RIGHT HEART;  Surgeon: Sajan Hurley MD;  Location: [facility] CATH LAB;  Service: Cardiology;  Laterality: Right;    RIGHT HEART CATHETERIZATION Right 1/22/2024    Procedure: INSERTION, CATHETER, RIGHT HEART;  Surgeon: Brayan Ocampo MD;  Location: [facility] CATH LAB;  Service: Cardiology;  Laterality: Right;    STERNAL WOUND CLOSURE N/A 12/4/2023    Procedure: CLOSURE, WOUND, STERNUM;  Surgeon: Yuri Washington MD;  [facility];  Service: Cardiovascular;  Laterality: N/A;    STERNOTOMY N/A 12/1/2023    Procedure: STERNOTOMY, REDO;  Surgeon: Yuri Washington MD;  Location: [facility] OR [facility];  Service: Cardiovascular;  Laterality: N/A;    VALVULOPLASTY, MITRAL VALVE N/A 12/1/2023    Procedure: VALVULOPLASTY, MITRAL VALVE;  Surgeon: Yuri Washington MD;  [facility];  Service: Cardiovascular;  Laterality: N/A;      No family history on file.   Social History     Socioeconomic History    Marital status:    Tobacco Use    Smoking status: Former     Current packs/day: 0.50     Types: Cigarettes   Substance and Sexual Activity    Alcohol use: Yes     Comment: rarely    Drug use: No    Sexual activity: Not Currently     Partners: Female     Social Drivers of Health     Financial Resource Strain: Patient Declined (7/25/2024)    Overall Financial Resource Strain (CARDIA)     Difficulty of Paying Living Expenses: Patient declined   Food Insecurity: Patient Declined (7/25/2024)    Hunger Vital Sign     Worried About Running Out of Food in the Last Year: Patient declined     Ran Out of Food in the Last Year: Patient declined   Transportation  Needs: Patient Declined (7/25/2024)    TRANSPORTATION NEEDS     Transportation : Patient declined   Physical Activity: Inactive (2/29/2024)    Exercise Vital Sign     Days of Exercise per Week: 2 days     Minutes of Exercise per Session: 0 min   Stress: Patient Declined (7/25/2024)    Citizen of [place] [employer] - Occupational Stress Questionnaire     Feeling of Stress : Patient declined   Housing Stability: Patient Declined (7/25/2024)    Housing Stability Vital Sign     Unable to Pay for Housing in the Last Year: Patient declined     Homeless in the Last Year: Patient declined      Review of patient's allergies indicates:  No Known Allergies        Physical Examination     Initial Vitals [10/16/24 1856]   BP Pulse Resp Temp SpO2   109/84 98 18 100 °F (37.8 °C) 97 %      MAP       --           Physical Exam    Nursing note and vitals reviewed.  Constitutional: He is not diaphoretic. No distress.   Cardiovascular:            Typical LVAD mechanical hum   Pulmonary/Chest: No respiratory distress.   Abdominal: Abdomen is soft. He exhibits no distension. There is no abdominal tenderness.   Genitourinary:    Genitourinary Comments: Area of perianal fluctuance and tenderness. No overlying erythema.        Neurological: He is alert and oriented to person, place, and time. GCS score is 15. GCS eye subscore is 4. GCS verbal subscore is 5. GCS motor subscore is 6.   Skin: Skin is warm and dry. No pallor.   Psychiatric: He has a normal mood and affect. His speech is normal and behavior is normal. He is not actively hallucinating. He is attentive.            Labs     Labs Reviewed   CBC W/ AUTO DIFFERENTIAL - Abnormal       Result Value    WBC 8.43      RBC 4.24 (*)     Hemoglobin 11.4 (*)     Hematocrit 36.8 (*)     MCV 87      MCH 26.9 (*)     MCHC 31.0 (*)     RDW 16.5 (*)     Platelets 301      MPV 9.3      Immature Granulocytes 0.7 (*)     Gran # (ANC) 6.9      Immature Grans (Abs) 0.06 (*)     Lymph # 0.6  (*)     Mono # 0.7      Eos # 0.1      Baso # 0.03      nRBC 0      Gran % 82.3 (*)     Lymph % 6.8 (*)     Mono % 8.3      Eosinophil % 1.5      Basophil % 0.4      Differential Method Automated     COMPREHENSIVE METABOLIC PANEL - Abnormal    Sodium 137      Potassium 3.5      Chloride 104      CO2 22 (*)     Glucose 166 (*)     BUN 20      Creatinine 3.7 (*)     Calcium 8.7      Total Protein 7.2      Albumin 2.5 (*)     Total Bilirubin 0.3      Alkaline Phosphatase 95      AST 32      ALT 29      eGFR 17.7 (*)     Anion Gap 11     PROTIME-INR - Abnormal    Prothrombin Time 32.2 (*)     INR 3.2 (*)    CULTURE, ANAEROBIC   CULTURE, AEROBIC  (SPECIFY SOURCE)        Imaging     Imaging Results              CT Pelvis Without Contrast (Final result)  Result time 10/17/24 00:23:47      Final result by Jake Gupta MD (10/17/24 00:23:47)                   Impression:      Fluid collection along the left gluteal cleft soft tissues measuring up to 3.9 cm, compatible with anorectal abscess.  Underlying neoplasm is neither confirmed nor fully excluded on this scan.  Correlate clinically.    No associated soft tissue emphysema.    No overt supralevator involvement.    Electronically signed by resident: Scooter Garcia  Date:    10/16/2024  Time:    23:59    Electronically signed by: Jake Gupta  Date:    10/17/2024  Time:    00:23               Narrative:    EXAMINATION:  CT PELVIS WITHOUT CONTRAST    CLINICAL HISTORY:  Anal/rectal abscess;    TECHNIQUE:  Axial images of the pelvis were obtained without the administration of intravenous contrast.  Coronal and sagittal reformats were also obtained.    COMPARISON:  CT abdomen pelvis 06/11/2024    FINDINGS:  BOWEL/MESENTERY: No evidence of bowel obstruction or inflammatory process.    BLADDER/REPRODUCTIVE: No bladder wall thickening.  Borderline prostate enlargement.    VASCULATURE: Moderate calcified atherosclerosis.    BONES: No acute fracture or bony destructive  process.    SOFT TISSUES: There is a fluid collection along the left gluteal cleft measuring 3.9 x 2.9 cm.    OTHER: No pelvic adenopathy, free fluid, or mass.                                        ED Course     The patient received the following medications:  Medications   morphine injection 6 mg (6 mg Intravenous Given 10/16/24 2010)   ondansetron injection 4 mg (4 mg Intravenous Given 10/16/24 2009)   morphine injection 4 mg (4 mg Intravenous Given 10/16/24 2157)   cefTRIAXone injection 1 g (1 g Intravenous Given 10/17/24 0642)                 Medical Decision Making                 Medical Decision Making  The patient had perianal fluctuance and tenderness that were consistent with perianal abscess.  CT was ordered to assess involvement of deeper tissues and was pending at the end of my shift, so the patient was signed out to Dr. Mcconnell.    CT showed an anorectal abscess that was 3.9 cm at its largest dimension.  Dr. Mcconnell consulted Colorectal surgery and they performed a bedside incision and drainage.  The patient received a dose of Rocephin in the ED and was prescribed doxycycline at discharge.    Problems Addressed:  Perianal abscess: acute illness or injury    Amount and/or Complexity of Data Reviewed  Labs: ordered.  Radiology: ordered.    Risk  Prescription drug management.              Diagnoses       ICD-10-CM ICD-9-CM   1. Perianal abscess  K61.0 566         Dispostion      ED Disposition Condition    Discharge Stable            ED Prescriptions       Medication Sig Dispense Start Date End Date Auth. Provider    doxycycline (VIBRAMYCIN) 100 MG Cap Take 1 capsule (100 mg total) by mouth 2 (two) times daily. for 7 days 14 capsule 10/17/2024 10/24/2024 Christa Mcconnell MD    oxyCODONE (ROXICODONE) 5 MG immediate release tablet Take 1 tablet (5 mg total) by mouth every 4 (four) hours as needed for Pain. 8 tablet 10/17/2024 -- Sergio Huynh MD            Follow-up Information       Follow up With  Specialties Details Why Contact Info Additional Information    Vasu Kong MD Internal Medicine Schedule an appointment as soon as possible for a visit   [address] [facility] [address]  [phone]       [facility] - Emergency Dept Emergency Medicine  As needed, If symptoms worsen [address] [facility] [address] [phone]     [facility]- [facility] 4th Fl Colon and Rectal Surgery Schedule an appointment as soon as possible for a visit   [address] [facility] [address] [phone] GI Center & Urology - [facility] 4th Floor Please park in [facility] and use Atrium elevator              Beto Yu III, MD  10/17/24 3347

## 2024-10-17 NOTE — DISCHARGE INSTRUCTIONS
You were treated today for a perianal abscess. This was drained by colorectal surgery. Please start taking the antibiotics as prescribed. You should follow up in clinic with colorectal surgery within the next week. A referral has been placed for you today. If you develop any worsening pain, swelling, redness, bleeding, fever or chills, you should return to the emergency department for re-evaluation.

## 2024-10-21 ENCOUNTER — LAB VISIT (OUTPATIENT)
Dept: LAB | Facility: HOSPITAL | Age: 64
End: 2024-10-21
Attending: INTERNAL MEDICINE
Payer: MEDICAID

## 2024-10-21 ENCOUNTER — ANTI-COAG VISIT (OUTPATIENT)
Dept: CARDIOLOGY | Facility: CLINIC | Age: 64
End: 2024-10-21
Payer: MEDICAID

## 2024-10-21 DIAGNOSIS — Z95.811 HEART REPLACED BY HEART ASSIST DEVICE: Primary | ICD-10-CM

## 2024-10-21 DIAGNOSIS — Z95.811 LVAD (LEFT VENTRICULAR ASSIST DEVICE) PRESENT: Primary | ICD-10-CM

## 2024-10-21 LAB
INR PPP: 3.9 (ref 0.8–1.2)
PROTHROMBIN TIME: 41.7 SEC (ref 9–12.5)

## 2024-10-21 PROCEDURE — 85610 PROTHROMBIN TIME: CPT | Performed by: INTERNAL MEDICINE

## 2024-10-21 PROCEDURE — 36415 COLL VENOUS BLD VENIPUNCTURE: CPT | Performed by: INTERNAL MEDICINE

## 2024-10-21 PROCEDURE — 93793 ANTICOAG MGMT PT WARFARIN: CPT | Mod: ,,,

## 2024-10-21 NOTE — PROGRESS NOTES
[id number]    10/21/2024 3:48 PM    Assessment/Plan:    Patient presents today with supratherapeutic INR.    Assessment of patient findings and chart review: completing 7d course of doxycycline     Recommendation for patient's warfarin regimen: Hold dose today then resume current maintenance dose    Recommend repeat INR Thursday   _________________________________________________________________    Radha Abbott (64 y.o.) is followed by the [facility].    Anticoagulation Summary  As of 10/21/2024      INR goal:  2.0-3.0   TTR:  66.0% (5.9 mo)   INR used for dosing:  3.9 (10/21/2024)   Warfarin maintenance plan:  2.5 mg (2.5 mg x 1) every day   Weekly warfarin total:  17.5 mg   Plan last modified:  Jesenia Jensen, PharmD (7/26/2024)   Next INR check:  10/24/2024   Target end date:  --    Indications    LVAD (left ventricular assist device) present [Z95.811]                 Anticoagulation Episode Summary       INR check location:  --    Preferred lab:  --    Send INR reminders to:  [facility]    Comments:  [facility]  ( [phone], fax [phone])  // [facility]          Anticoagulation Care Providers       Provider Role Specialty Phone number    Sajan Hurley MD [facility] [phone]

## 2024-10-21 NOTE — PROGRESS NOTES
Wife verified correct warfarin dose, reports was in ED 10/16 due to bleeding from abscess -states bleeding has stopped, was started on Doxycycline--has 3 days left, was given IV morphine at ED, was kept over night and discharged 10/17, also took Oxycodone Thursday and Friday,

## 2024-10-22 LAB
BACTERIA SPEC ANAEROBE CULT: ABNORMAL
BACTERIA SPEC ANAEROBE CULT: ABNORMAL

## 2024-10-24 ENCOUNTER — OFFICE VISIT (OUTPATIENT)
Dept: SURGERY | Facility: CLINIC | Age: 64
End: 2024-10-24
Payer: MEDICAID

## 2024-10-24 ENCOUNTER — HOSPITAL ENCOUNTER (OUTPATIENT)
Dept: PULMONOLOGY | Facility: CLINIC | Age: 64
Discharge: HOME | End: 2024-10-24
Payer: MEDICAID

## 2024-10-24 ENCOUNTER — ANTI-COAG VISIT (OUTPATIENT)
Dept: CARDIOLOGY | Facility: CLINIC | Age: 64
End: 2024-10-24
Payer: MEDICAID

## 2024-10-24 ENCOUNTER — DOCUMENTATION ONLY (OUTPATIENT)
Dept: CARDIOTHORACIC SURGERY | Facility: CLINIC | Age: 64
End: 2024-10-24
Payer: MEDICAID

## 2024-10-24 ENCOUNTER — LAB VISIT (OUTPATIENT)
Dept: LAB | Facility: HOSPITAL | Age: 64
End: 2024-10-24
Payer: MEDICAID

## 2024-10-24 ENCOUNTER — OFFICE VISIT (OUTPATIENT)
Dept: TRANSPLANT | Facility: CLINIC | Age: 64
End: 2024-10-24
Payer: MEDICAID

## 2024-10-24 ENCOUNTER — CLINICAL SUPPORT (OUTPATIENT)
Dept: TRANSPLANT | Facility: CLINIC | Age: 64
End: 2024-10-24
Payer: MEDICAID

## 2024-10-24 VITALS
HEIGHT: 69 IN | BODY MASS INDEX: 25.03 KG/M2 | WEIGHT: 169 LBS | TEMPERATURE: 99 F | SYSTOLIC BLOOD PRESSURE: 88 MMHG | WEIGHT: 167 LBS | BODY MASS INDEX: 24.73 KG/M2 | HEART RATE: 78 BPM | HEIGHT: 69 IN

## 2024-10-24 VITALS
BODY MASS INDEX: 26.06 KG/M2 | SYSTOLIC BLOOD PRESSURE: 88 MMHG | WEIGHT: 175.94 LBS | HEART RATE: 78 BPM | HEIGHT: 69 IN

## 2024-10-24 DIAGNOSIS — Z95.811 LVAD (LEFT VENTRICULAR ASSIST DEVICE) PRESENT: ICD-10-CM

## 2024-10-24 DIAGNOSIS — I50.43 ACUTE ON CHRONIC COMBINED SYSTOLIC AND DIASTOLIC HEART FAILURE: ICD-10-CM

## 2024-10-24 DIAGNOSIS — I25.10 CORONARY ARTERY DISEASE INVOLVING NATIVE CORONARY ARTERY OF NATIVE HEART WITHOUT ANGINA PECTORIS: ICD-10-CM

## 2024-10-24 DIAGNOSIS — Z95.811 HEART REPLACED BY HEART ASSIST DEVICE: Primary | ICD-10-CM

## 2024-10-24 DIAGNOSIS — N18.6 CKD (CHRONIC KIDNEY DISEASE) STAGE V REQUIRING CHRONIC DIALYSIS: ICD-10-CM

## 2024-10-24 DIAGNOSIS — Z95.811 PRESENCE OF VENTRICULAR ASSIST DEVICE: ICD-10-CM

## 2024-10-24 DIAGNOSIS — Z95.811 PRESENCE OF VENTRICULAR ASSIST DEVICE: Primary | ICD-10-CM

## 2024-10-24 DIAGNOSIS — Z99.2 CKD (CHRONIC KIDNEY DISEASE) STAGE V REQUIRING CHRONIC DIALYSIS: ICD-10-CM

## 2024-10-24 DIAGNOSIS — I48.0 PAF (PAROXYSMAL ATRIAL FIBRILLATION): ICD-10-CM

## 2024-10-24 DIAGNOSIS — E44.0 MODERATE PROTEIN-CALORIE MALNUTRITION: ICD-10-CM

## 2024-10-24 DIAGNOSIS — Z95.811 LVAD (LEFT VENTRICULAR ASSIST DEVICE) PRESENT: Primary | ICD-10-CM

## 2024-10-24 DIAGNOSIS — I47.20 VENTRICULAR TACHYCARDIA: ICD-10-CM

## 2024-10-24 LAB
ALBUMIN SERPL BCP-MCNC: 2.5 G/DL (ref 3.5–5.2)
ALP SERPL-CCNC: 110 U/L (ref 40–150)
ALT SERPL W/O P-5'-P-CCNC: 30 U/L (ref 10–44)
ANION GAP SERPL CALC-SCNC: 11 MMOL/L (ref 8–16)
AST SERPL-CCNC: 38 U/L (ref 10–40)
BASOPHILS # BLD AUTO: 0.04 K/UL (ref 0–0.2)
BASOPHILS NFR BLD: 0.6 % (ref 0–1.9)
BILIRUB DIRECT SERPL-MCNC: 0.2 MG/DL (ref 0.1–0.3)
BILIRUB SERPL-MCNC: 0.4 MG/DL (ref 0.1–1)
BNP SERPL-MCNC: 846 PG/ML (ref 0–99)
BUN SERPL-MCNC: 30 MG/DL (ref 8–23)
CALCIUM SERPL-MCNC: 8.8 MG/DL (ref 8.7–10.5)
CHLORIDE SERPL-SCNC: 98 MMOL/L (ref 95–110)
CO2 SERPL-SCNC: 27 MMOL/L (ref 23–29)
CREAT SERPL-MCNC: 4.7 MG/DL (ref 0.5–1.4)
CRP SERPL-MCNC: 61.9 MG/L (ref 0–8.2)
DIFFERENTIAL METHOD BLD: ABNORMAL
EOSINOPHIL # BLD AUTO: 0.2 K/UL (ref 0–0.5)
EOSINOPHIL NFR BLD: 2.7 % (ref 0–8)
ERYTHROCYTE [DISTWIDTH] IN BLOOD BY AUTOMATED COUNT: 16.6 % (ref 11.5–14.5)
EST. GFR  (NO RACE VARIABLE): 13.3 ML/MIN/1.73 M^2
GLUCOSE SERPL-MCNC: 148 MG/DL (ref 70–110)
HCT VFR BLD AUTO: 41.4 % (ref 40–54)
HGB BLD-MCNC: 11.9 G/DL (ref 14–18)
IMM GRANULOCYTES # BLD AUTO: 0.05 K/UL (ref 0–0.04)
IMM GRANULOCYTES NFR BLD AUTO: 0.8 % (ref 0–0.5)
INR PPP: 2.6 (ref 0.8–1.2)
LDH SERPL L TO P-CCNC: 324 U/L (ref 110–260)
LYMPHOCYTES # BLD AUTO: 0.7 K/UL (ref 1–4.8)
LYMPHOCYTES NFR BLD: 10.7 % (ref 18–48)
MAGNESIUM SERPL-MCNC: 1.8 MG/DL (ref 1.6–2.6)
MCH RBC QN AUTO: 25.2 PG (ref 27–31)
MCHC RBC AUTO-ENTMCNC: 28.7 G/DL (ref 32–36)
MCV RBC AUTO: 88 FL (ref 82–98)
MONOCYTES # BLD AUTO: 0.4 K/UL (ref 0.3–1)
MONOCYTES NFR BLD: 6.6 % (ref 4–15)
NEUTROPHILS # BLD AUTO: 5 K/UL (ref 1.8–7.7)
NEUTROPHILS NFR BLD: 78.6 % (ref 38–73)
NRBC BLD-RTO: 0 /100 WBC
PHOSPHATE SERPL-MCNC: 2.6 MG/DL (ref 2.7–4.5)
PLATELET # BLD AUTO: 289 K/UL (ref 150–450)
PMV BLD AUTO: 9.2 FL (ref 9.2–12.9)
POTASSIUM SERPL-SCNC: 4.3 MMOL/L (ref 3.5–5.1)
PREALB SERPL-MCNC: 18 MG/DL (ref 20–43)
PROT SERPL-MCNC: 7.5 G/DL (ref 6–8.4)
PROTHROMBIN TIME: 27.1 SEC (ref 9–12.5)
RBC # BLD AUTO: 4.73 M/UL (ref 4.6–6.2)
SODIUM SERPL-SCNC: 136 MMOL/L (ref 136–145)
WBC # BLD AUTO: 6.37 K/UL (ref 3.9–12.7)

## 2024-10-24 PROCEDURE — 85025 COMPLETE CBC W/AUTO DIFF WBC: CPT | Performed by: INTERNAL MEDICINE

## 2024-10-24 PROCEDURE — 84100 ASSAY OF PHOSPHORUS: CPT | Performed by: INTERNAL MEDICINE

## 2024-10-24 PROCEDURE — 83735 ASSAY OF MAGNESIUM: CPT | Performed by: INTERNAL MEDICINE

## 2024-10-24 PROCEDURE — 1160F RVW MEDS BY RX/DR IN RCRD: CPT | Mod: CPTII,,, | Performed by: NURSE PRACTITIONER

## 2024-10-24 PROCEDURE — 99204 OFFICE O/P NEW MOD 45 MIN: CPT | Mod: S$PBB,,, | Performed by: NURSE PRACTITIONER

## 2024-10-24 PROCEDURE — 83615 LACTATE (LD) (LDH) ENZYME: CPT | Performed by: INTERNAL MEDICINE

## 2024-10-24 PROCEDURE — 80053 COMPREHEN METABOLIC PANEL: CPT | Performed by: INTERNAL MEDICINE

## 2024-10-24 PROCEDURE — 99999 PR PBB SHADOW E&M-EST. PATIENT-LVL I: CPT | Mod: PBBFAC,,,

## 2024-10-24 PROCEDURE — 36415 COLL VENOUS BLD VENIPUNCTURE: CPT | Performed by: INTERNAL MEDICINE

## 2024-10-24 PROCEDURE — 99999 PR PBB SHADOW E&M-EST. PATIENT-LVL IV: CPT | Mod: PBBFAC,,, | Performed by: INTERNAL MEDICINE

## 2024-10-24 PROCEDURE — 99999 PR PBB SHADOW E&M-EST. PATIENT-LVL IV: CPT | Mod: PBBFAC,,, | Performed by: NURSE PRACTITIONER

## 2024-10-24 PROCEDURE — 99214 OFFICE O/P EST MOD 30 MIN: CPT | Mod: PBBFAC,27 | Performed by: NURSE PRACTITIONER

## 2024-10-24 PROCEDURE — 85610 PROTHROMBIN TIME: CPT | Performed by: INTERNAL MEDICINE

## 2024-10-24 PROCEDURE — 82248 BILIRUBIN DIRECT: CPT | Performed by: INTERNAL MEDICINE

## 2024-10-24 PROCEDURE — 86140 C-REACTIVE PROTEIN: CPT | Performed by: INTERNAL MEDICINE

## 2024-10-24 PROCEDURE — 3008F BODY MASS INDEX DOCD: CPT | Mod: CPTII,,, | Performed by: NURSE PRACTITIONER

## 2024-10-24 PROCEDURE — 99211 OFF/OP EST MAY X REQ PHY/QHP: CPT | Mod: PBBFAC,27,25

## 2024-10-24 PROCEDURE — 3066F NEPHROPATHY DOC TX: CPT | Mod: CPTII,,, | Performed by: NURSE PRACTITIONER

## 2024-10-24 PROCEDURE — 83880 ASSAY OF NATRIURETIC PEPTIDE: CPT | Performed by: INTERNAL MEDICINE

## 2024-10-24 PROCEDURE — 99214 OFFICE O/P EST MOD 30 MIN: CPT | Mod: PBBFAC | Performed by: INTERNAL MEDICINE

## 2024-10-24 PROCEDURE — 3044F HG A1C LEVEL LT 7.0%: CPT | Mod: CPTII,,, | Performed by: NURSE PRACTITIONER

## 2024-10-24 PROCEDURE — 1159F MED LIST DOCD IN RCRD: CPT | Mod: CPTII,,, | Performed by: NURSE PRACTITIONER

## 2024-10-24 PROCEDURE — 94618 PULMONARY STRESS TESTING: CPT | Mod: PBBFAC | Performed by: INTERNAL MEDICINE

## 2024-10-24 PROCEDURE — 84134 ASSAY OF PREALBUMIN: CPT | Performed by: INTERNAL MEDICINE

## 2024-10-24 NOTE — PROGRESS NOTES
Pt presented for 12 month follow-up and verbalized consent and willingness to continue to participate with the [facility] registry.      Patient completed the EQ-5D quality of life questionnaire, KCCQ, and the Trail Making neurocognitive test in 206 seconds.

## 2024-10-24 NOTE — PROCEDURES
10/24/2024    10:09 AM 10/16/2024     7:43 PM 9/26/2024    10:21 AM 8/29/2024    11:07 AM 8/7/2024     4:00 PM 8/7/2024    12:13 PM 8/7/2024     7:16 AM   TXP PINA INTERROGATIONS   Type HeartMate3  HeartMate3 HeartMate3      Flow 3.7  3.8 4      Speed 5050  5000 5000      PI 5.2  6.8 4      Power ([place]) 2.5  3.5 3.4      LSL 4600  4600 4600      Pulsatility No Pulse Intermittent pulse No Pulse No Pulse Intermittent pulse Intermittent pulse Intermittent pulse   }

## 2024-10-24 NOTE — PROGRESS NOTES
[facility]    10/24/2024    Radha Abbott (64 y.o.) is followed by the [facility].      Assessment/Plan:    Radha Abbott presents with a therapeutic INR. Goal INR: 2.0-3.0    Lab Results   Component Value Date    INR 2.6 (H) 10/24/2024    INR 3.9 (H) 10/21/2024    INR 3.2 (H) 10/16/2024       Assessment of patient findings per MA/LPN and chart review:   The following significant findings were found:   None    Recommendation for patient's warfarin regimen:   No change was made to warfarin therapy during this visit and patient has been instructed to continue their current warfarin regimen.    Recommended repeat INR in 1 week      Joycelyn Angel PharmD, BCPS  Clinical Pharmacist - [facility]  Preferred Contact: Secure Messaging or In Basket Message

## 2024-10-24 NOTE — LETTER
October 24, 2024        Kevin Franklin  [address] [place] [address]  Phone: [phone]  Fax: [phone]             [facility]  [address]  [facility] [address]  Phone: [phone]   Patient: Radha Abbott   MR Number: [id number]   YOB: 1960   Date of Visit: 10/24/2024       Dear Dr. Kevin Franklin    Thank you for referring Radha Abbott to me for evaluation. Attached you will find relevant portions of my assessment and plan of care.    If you have questions, please do not hesitate to call me. I look forward to following Radha Abbott along with you.    Sincerely,    Brayan Ocampo MD    Enclosure    If you would like to receive this communication electronically, please contact [email or web address] or [phone] to request [employer] Link access.    [employer] Link is a tool which provides read-only access to select patient information with whom you have a relationship. Its easy to use and provides real time access to review your patients record including encounter summaries, notes, results, and demographic information.    If you feel you have received this communication in error or would no longer like to receive these types of communications, please e-mail [email or web address]

## 2024-10-24 NOTE — PROGRESS NOTES
Equipment:  Any Equipment Needs: Routine Maintenance    Emergency Bag  Emergency Equipment With Patient: Yes   Condition of emergency bag: NO visible damage  Contents of emergency bag: Backup controller, 2 fully charged batteries, 2 battery clips, reference cards    Maintenance Tracking  Patient has monthly checklist today: No  Patient correctly utilizing monthly checklist:  N/A  Educated patient on utilizing monthly checklist correctly and importance of this: Yes    Equipment Inspection  Inspected patient's equipment today: Yes  Equipment clean and free of debris, including locking mechanism: Yes  Cleaned equipment today and educated patient on how to do this: Yes  Manual and visual inspection of driveline: NO   Kinks, rescue tape, tears: No  Clamshell repair: No  Perc lead repair: No    Patient wearing waist strap, vest, vielka vest, concealed carry shirt: Consolidated bag  Condition of this: NO visible damage        Equipment Needs  Equipment issued to patient today in clinic: No   Discussed with MCS Coordinator and physician: Yes  Items Under Warranty No VAD Coordinator Notified Yes  Equipment loaned to patient today: No   Waveforms sent: No   It is medically necessary to ensure patient has properly functioning equipment and wearables to prevent infection, injury or death to patient.

## 2024-10-24 NOTE — PROGRESS NOTES
"Subjective:   Patient ID:  Radha Abbott is a 64 y.o. male who presents for LVAD followup visit.    Implant Date:12/1/23     Heartmate 3 RPM 5000     INR goal: 2-3   Bridge with Heparin   Antiplatelets: None d/t low H/H on implant         10/24/2024    10:09 AM 10/16/2024     7:43 PM 9/26/2024    10:21 AM 8/29/2024    11:07 AM 8/7/2024     4:00 PM 8/7/2024    12:13 PM 8/7/2024     7:16 AM   TXP PINA INTERROGATIONS   Type HeartMate3  HeartMate3 HeartMate3      Flow 3.7  3.8 4      Speed 5050  5000 5000      PI 5.2  6.8 4      Power (Carrington) 2.5  3.5 3.4      LSL 4600  4600 4600      Pulsatility No Pulse Intermittent pulse No Pulse No Pulse Intermittent pulse Intermittent pulse Intermittent pulse       HPI  Mr. Radha Abbott is a 63 yo male with stage D HFrEF, ICMP who underwent HM3 placement placed by Dr Washington on 12/1/23. Lengthy post op course complicated by vasoplegia(requiring Giaprezza), debility, malnutrition/impaired swallowing, and renal failure requiring HD (since weaned off). Once medically stable, he was transferred to Rehab for further PT/OT/ST. He had done well for a hile but then had several clinical events including; worsening renal failure and  COVID/pneumonia. Was discharged and readmitted again for C diff for which he was successfully treated. He is on HD now on MWF (3 hours sessions). He comes today for a follow-up visit. Doing better.  Has no complaints.  VAD speed is at 5050 rpm. No VAD alarms noted on interrogation occasional PI events . BP is 88 mm of Hg. DLES is a "1". Most recent INR was therapeutic at 2.6. LDH is at baseline.     Review of Systems   Constitutional: Negative. Negative for chills, decreased appetite, diaphoresis, fever, malaise/fatigue, night sweats, weight gain and weight loss.   Eyes: Negative.    Cardiovascular:  Negative for chest pain, claudication, cyanosis, dyspnea on exertion, irregular heartbeat, leg swelling, near-syncope, orthopnea, palpitations, paroxysmal nocturnal " "dyspnea and syncope.   Respiratory:  Negative for cough, hemoptysis and shortness of breath.    Endocrine: Negative.    Hematologic/Lymphatic: Negative.    Skin:  Negative for color change, dry skin and nail changes.   Musculoskeletal: Negative.    Gastrointestinal: Negative.    Genitourinary: Negative.    Neurological:  Negative for weakness.       Objective:   Blood pressure (!) 88/0, pulse 78, temperature 98.5 °F (36.9 °C), temperature source Oral, height 5' 9" (1.753 m), weight 76.7 kg (169 lb).body mass index is 24.96 kg/m².    Doppler: 88    Physical Exam  Vitals reviewed.   Constitutional:       Appearance: He is well-developed.      Comments: BP (!) 88/0 (BP Location: Right arm, Patient Position: Sitting)   Pulse 78   Temp 98.5 °F (36.9 °C) (Oral)   Ht 5' 9" (1.753 m)   Wt 76.7 kg (169 lb)   BMI 24.96 kg/m²      HENT:      Head: Normocephalic.   Neck:      Vascular: No carotid bruit or JVD.   Cardiovascular:      Rate and Rhythm: Regular rhythm.      Pulses: Normal pulses.      Heart sounds: Normal heart sounds. No murmur heard.     Comments: Smooth VAD hum. DLES "1"  Pulmonary:      Effort: Pulmonary effort is normal.      Breath sounds: Normal breath sounds.   Abdominal:      General: Bowel sounds are normal.      Palpations: Abdomen is soft.   Skin:     General: Skin is warm.   Neurological:      Mental Status: He is alert.         Lab Results   Component Value Date    WBC 6.37 10/24/2024    HGB 11.9 (L) 10/24/2024    HCT 41.4 10/24/2024    MCV 88 10/24/2024     10/24/2024    CO2 27 10/24/2024    CREATININE 4.7 (H) 10/24/2024    CALCIUM 8.8 10/24/2024    ALBUMIN 2.5 (L) 10/24/2024    AST 38 10/24/2024     (H) 10/24/2024    ALT 30 10/24/2024     (H) 10/24/2024       Lab Results   Component Value Date    INR 2.6 (H) 10/24/2024    INR 3.9 (H) 10/21/2024    INR 3.2 (H) 10/16/2024       BNP   Date Value Ref Range Status   10/24/2024 846 (H) 0 - 99 pg/mL Final     Comment:     Values " of less than 100 pg/ml are consistent with non-CHF populations.   10/03/2024 730 (H) 0 - 99 pg/mL Final     Comment:     Values of less than 100 pg/ml are consistent with non-CHF populations.   09/26/2024 500 (H) 0 - 99 pg/mL Final     Comment:     Values of less than 100 pg/ml are consistent with non-CHF populations.       LD   Date Value Ref Range Status   10/24/2024 324 (H) 110 - 260 U/L Final     Comment:     Results are increased in hemolyzed samples.   09/26/2024 260 110 - 260 U/L Final     Comment:     Results are increased in hemolyzed samples.   08/29/2024 303 (H) 110 - 260 U/L Final     Comment:     Results are increased in hemolyzed samples.       Assessment:      1. Presence of ventricular assist device    2. LVAD (left ventricular assist device) present    3. Acute on chronic combined systolic and diastolic heart failure    4. Ventricular tachycardia    5. PAF (paroxysmal atrial fibrillation)    6. Moderate protein-calorie malnutrition    7. Coronary artery disease involving native coronary artery of native heart without angina pectoris    8. CKD (chronic kidney disease) stage V requiring chronic dialysis        Plan:   Patient is now NYHA class II but will benefit from outpatient rehab   Owing to his advanced kidney disease and being on dialysis with low BP, he is not on a  ACEI/ARB/ARNI or MRA. He is also not on a beta blocker due to RV dysfunction.   BP is controlled.  INR is therapeutic.   VAD interrogation was performed in clinic  Any VAD management kits dispensed today medically necessary  Recommend 2 gram sodium restriction and 1500cc fluid restriction.  Encourage physical activity with graded exercise program.  Requested patient to weigh themselves daily, and to notify us if their weight increases by more than 3 lbs in 1 day or 5 lbs in 1 week.   Additionally, the patient has a patient centered goal of being able to get stronger (referral to rehab was done today)  RTC in 1 month      Listed for  transplant: No. Patient inquired about candidacy for OHTx. In view of hsi clinical comorbidities and the need of Dual organ Herat and Kidney, he would likely be declined in our center. My recommendation is for him to get stronger (very debilitated at this time) and get a second opinion.      UNOS Patient Status  Functional Status: 40% - Disabled: requires special care and assistance  Physical Capacity: No Limitations  Working for Income: No  If no, reason not working: Demands of Treatment        Advance Care Planning  Date: 05/23/2024  Patient has ACP docs in file.      Brayan Ocampo MD

## 2024-10-24 NOTE — PROGRESS NOTES
"Date of Implant with Heartmate 3 LVAD: 12/1/23    PATIENT ARRIVED IN CLINIC:  walker  Accompanied by: wife  Complaints/reason for visit today: routine    Vitals  Temperature, oral:   Temp Readings from Last 1 Encounters:   10/24/24 98.5 °F (36.9 °C) (Oral)     Blood Pressure:   BP Readings from Last 3 Encounters:   10/24/24 (!) 88/0   10/17/24 (!) 96/55   09/26/24 (!) 82/0        VAD Interrogation:      10/24/2024    10:09 AM 10/16/2024     7:43 PM 9/26/2024    10:21 AM   TXP PINA INTERROGATIONS   Type HeartMate3  HeartMate3   Flow 3.7  3.8   Speed 5050  5000   PI 5.2  6.8   Power (Carrington) 2.5  3.5   LSL 4600  4600   Pulsatility No Pulse Intermittent pulse No Pulse     HCT:   Lab Results   Component Value Date    HCT 41.4 10/24/2024    HCT 39 07/23/2024       VAD Assessment  Problems / Issues /Equipment Needs/ Alarms with VAD if any: 17 speed drops noted to 4700 since 10/23 @ 0400  VAD Sounds: HM3   VAD Binder With Patient: Yes  Reviewed VAD Numbers In Binder: Yes  Emergency Equipment With Patient: Yes   Equipment Needs: No   It is medically necessary to ensure patient has properly functioning equipment and wearables to prevent infection, injury or death to patient.     DLES Assessment and Dressing Care:  Appearance of DLES: "1"  Antibiotics: YES for rectal boil  Velour: No  Manual & Visual Inspection Of Driveline: No kinks or tears noted  Stabilization Device In Use: yes, goodman securement device    Heartmate 3 Module Cable:  No yellow exposed and Attempted to unscrew modular cable to ensure it will be able to come lose in the event we ever need to change the modular cable while patient held the driveline in place so it would not move. Modular cable connection able to be unscrewed and re-tightened. Instructed pt to perform this weekly.  Frequency of Dressing Changes: weekly & silverlon kit   Pt In Need Of Management Kits?:no   It is medically necessary to have VAD management kits in order to prevent infection or to " assist in the healing of an infected DLES.    Assessment/ Quality of Life Survery:   Complaints Of Nausea / Vomiting: None noted    Appearance and Frequency Of Stools: normal and formed without blood & daily  Color Of Urine: tea  Pain: NO  Coping/Depression/Anxiety: coping okay, angry, anxious, and depressed due to not working. 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 Reminded to pull over should there be an alarm before looking down at controller.    Labs:    Chemistry        Component Value Date/Time     10/24/2024 0839    K 4.3 10/24/2024 0839    CL 98 10/24/2024 0839    CO2 27 10/24/2024 0839    BUN 30 (H) 10/24/2024 0839    CREATININE 4.7 (H) 10/24/2024 0839     (H) 10/24/2024 0839        Component Value Date/Time    CALCIUM 8.8 10/24/2024 0839    ALKPHOS 110 10/24/2024 0839    AST 38 10/24/2024 0839    ALT 30 10/24/2024 0839    BILITOT 0.4 10/24/2024 0839    ESTGFRAFRICA >60.0 02/07/2018 0935    EGFRNONAA >60.0 02/07/2018 0935            Magnesium   Date Value Ref Range Status   10/24/2024 1.8 1.6 - 2.6 mg/dL Final       Lab Results   Component Value Date    WBC 6.37 10/24/2024    HGB 11.9 (L) 10/24/2024    HCT 41.4 10/24/2024    MCV 88 10/24/2024     10/24/2024       Lab Results   Component Value Date    INR 2.6 (H) 10/24/2024    INR 3.9 (H) 10/21/2024    INR 3.2 (H) 10/16/2024       BNP   Date Value Ref Range Status   10/24/2024 846 (H) 0 - 99 pg/mL Final     Comment:     Values of less than 100 pg/ml are consistent with non-CHF populations.   10/03/2024 730 (H) 0 - 99 pg/mL Final     Comment:     Values of less than 100 pg/ml are consistent with  non-CHF populations.   09/26/2024 500 (H) 0 - 99 pg/mL Final     Comment:     Values of less than 100 pg/ml are consistent with non-CHF populations.       LD   Date Value Ref Range Status   10/24/2024 324 (H) 110 - 260 U/L Final     Comment:     Results are increased in hemolyzed samples.   09/26/2024 260 110 - 260 U/L Final     Comment:     Results are increased in hemolyzed samples.   08/29/2024 303 (H) 110 - 260 U/L Final     Comment:     Results are increased in hemolyzed samples.       Labs reviewed with patient: YES     Medication Reconciliation: per MA.  New Medication Detail Provided: yes  Coumadin Managed by: [facility]    Education: Reviewed driveline care, emergency procedures, how to change the controller, alarms with patient, as well as discussed how to page the VAD coordinator in case of an emergency.   Educated patient/family that chest compressions are allowed in the event they are needed.    Reminded patient/caregiver not to touch their face and to cover their mouth when they cough or sneeze.   Vaccines: Pt informed that we are encouraging all VAD patients to receive  vaccines and boosters. Informed pt that they can take tylenol but should avoid other NSAIDs.       Plans/Needs:   Pt continues HD M-W-F.  They are asking if his kidneys will recover and be able to stop HD. He is making urine.  I explained they should discuss with nephrologist as they would be the ones to challenge his kidneys and see.   Arlyn asking for referral to outpt therapy. I placed external order today and handed it to her.   After visit, was alerted that culture from boil is resulted. Sent a message to ID asking them to follow up on abx needs please. Notified coumadin clinic also.   RTC in 4 months with lipids, TSH, T4. Refer to MD note     Hurricane Season: Yes, discussed with patient: With hurricane season approaching, we want to make sure you are fully prepared for any emergency.  Should the National Weather  Service or your local authorities recommend a voluntary or mandatory evacuation of your area, The VAD team requires you to evacuate to a safe place.  Remember, when it is a mandatory evacuation, traffic will become an issue for your limited battery power.  Therefore, we strongly urge you to evacuate early.      The VAD team advises you to have the following in place before hurricane season:  Have an evacuation plan in place including places to evacuate, names and phone numbers.  This information is required to be given to the VAD coordinator.  Have your VAD emergency contact numbers with you.  Make sure your prescriptions will not run out by the end of September.  Make sure you have enough medications, including pills, inhalers, patches,     etc. to take, should you be gone for more than 2 weeks.  Make sure ALL of your batteries are fully charged.  Bring enough dressing change supplies to last for at least 2 weeks.  Bring your VAD binder with you.  Make sure your binder is updated and complete with alarms reference card, patient hand book, emergency contact numbers, daily log sheets, etc.    If you do not have family or friends as an evacuation destination, we recommend evacuating to a safe area.   Do NOT evacuate to [facility].  The VAD team wants to stress the importance of planning for your evacuation in the event of a hurricane.  If you have any LVAD questions or issues, please contact the LVAD coordinator.

## 2024-10-24 NOTE — PROGRESS NOTES
"CRS Office Visit History and Physical    Referring Md:   Self, Aaareferral  No address on file    SUBJECTIVE:     Chief Complaint: abscess    History of Present Illness:  The patient is new patient to this practice.   Course is as follows:  Patient is a 64 y.o. male with LVAD and hemodialysis presents for ED f/u of perianal abscess I&D.  Seen in ED with I and D 1 week ago after worsening anal pain.  Reports this drained for approx 3 days after.  Has since fully resolved.  Pain fully resolved.      Last colonoscopy: "many years ago" normal per pt report  Family history of colorectal cancer or IBD: none.    Review of patient's allergies indicates:  No Known Allergies    Past Medical History:   Diagnosis Date    Aspiration pneumonia of both lungs 06/17/2024    CAD (coronary artery disease)     CHF (congestive heart failure)     Delirium 06/16/2024    Diabetes mellitus     HFrEF (heart failure with reduced ejection fraction)     Hypoglycemia 06/12/2024    ICD (implantable cardioverter-defibrillator) in place     LVAD (left ventricular assist device) present 12/01/2023    MI, old     PAF (paroxysmal atrial fibrillation) 10/11/2023    Stage 4 chronic kidney disease 02/15/2024    Type 2 diabetes mellitus without complication, without long-term current use of insulin 10/07/2023     Past Surgical History:   Procedure Laterality Date    ANGIOPLASTY-VENOUS ARTERY Right 12/1/2023    Procedure: ANGIOPLASTY-VENOUS ARTERY, RIGHT FEMORAL;  Surgeon: Yuri Washington MD;  Location: [facility] OR [address] [place] [address];  Service: Cardiovascular;  Laterality: Right;    AORTIC VALVULOPLASTY N/A 12/1/2023    Procedure: REPAIR, AORTIC VALVE;  Surgeon: Yuri Washington MD;  [facility];  Service: Cardiovascular;  Laterality: N/A;    CARDIAC SURGERY      CLOSURE N/A 12/1/2023    Procedure: CLOSURE, TEMPORARY;  Surgeon: Yuri Washington MD;  Location: [facility] OR [address] [place] [address];  Service: Cardiovascular;  Laterality: N/A;    DRAINAGE OF PLEURAL EFFUSION  " 12/4/2023    Procedure: DRAINAGE, PLEURAL EFFUSION;  Surgeon: Yuri Washington MD;  Location: [facility] OR [facility] FLR;  Service: Cardiovascular;;    INSERTION OF GRAFT TO PERICARDIUM  12/4/2023    Procedure: INSERTION, GRAFT, PERICARDIUM;  Surgeon: Yuri Washington MD;  Location: [facility] OR 2ND FLR;  Service: Cardiovascular;;    INSERTION OF INTRA-AORTIC BALLOON ASSIST DEVICE Right 11/21/2023    Procedure: INSERTION, INTRA-AORTIC BALLOON PUMP;  Surgeon: Finn Cohn MD;  Location: [facility] CATH LAB;  Service: Cardiology;  Laterality: Right;    LEFT VENTRICULAR ASSIST DEVICE Left 12/1/2023    Procedure: INSERTION-LEFT VENTRICULAR ASSIST DEVICE;  Surgeon: Yuri Washington MD;  Location: [facility] OR [facility] FLR;  Service: Cardiovascular;  Laterality: Left;  REDO STERNOTOMY - REDO SAW NEEDED FOR CASE    LYSIS OF ADHESIONS  12/1/2023    Procedure: LYSIS, ADHESIONS;  Surgeon: Yuri Washington MD;  [facility];  Service: Cardiovascular;;    PLACEMENT OF SWAN ROLANDO CATHETER WITH IMAGING GUIDANCE  11/20/2023    Procedure: INSERTION, CATHETER, SWAN-ROLANDO, WITH IMAGING GUIDANCE;  Surgeon: Sajan Hurley MD;  Location: [facility] CATH LAB;  Service: Cardiology;;    REMOVAL OF TUNNELED CENTRAL VENOUS CATHETER (CVC) N/A 3/1/2024    Procedure: REMOVAL, CATHETER, CENTRAL VENOUS, TUNNELED;  Surgeon: Seble Aguilar MD;  Location: 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;  Service: Cardiovascular;  Laterality: Right;  Right Femoral Artery Repair    RIGHT HEART CATHETERIZATION Right 10/10/2023    Procedure: INSERTION, CATHETER, RIGHT HEART;  Surgeon: Bin Gandhi MD;  Location: [facility] CATH LAB;  Service: Cardiology;  Laterality: Right;    RIGHT HEART CATHETERIZATION Right 10/13/2023    Procedure: INSERTION, CATHETER, RIGHT HEART;  Surgeon: Francisco Javier  "MD Walter;  Location: [facility] CATH LAB;  Service: Cardiology;  Laterality: Right;    RIGHT HEART CATHETERIZATION  11/13/2023    RIGHT HEART CATHETERIZATION Right 11/13/2023    Procedure: INSERTION, CATHETER, RIGHT HEART;  Surgeon: Juventino Bermudez Jr., MD;  Location: [facility] CATH LAB;  Service: Cardiology;  Laterality: Right;    RIGHT HEART CATHETERIZATION Right 11/20/2023    Procedure: INSERTION, CATHETER, RIGHT HEART;  Surgeon: Sajan Hurley MD;  Location: [facility] CATH LAB;  Service: Cardiology;  Laterality: Right;    RIGHT HEART CATHETERIZATION Right 1/22/2024    Procedure: INSERTION, CATHETER, RIGHT HEART;  Surgeon: Brayan Ocampo MD;  Location: [facility] CATH LAB;  Service: Cardiology;  Laterality: Right;    STERNAL WOUND CLOSURE N/A 12/4/2023    Procedure: CLOSURE, WOUND, STERNUM;  Surgeon: Yuri Washington MD;  Location: [facility] OR [facility] FLR;  Service: Cardiovascular;  Laterality: N/A;    STERNOTOMY N/A 12/1/2023    Procedure: STERNOTOMY, REDO;  Surgeon: Yuri Washington MD;  Location: [facility] OR [facility] FLR;  Service: Cardiovascular;  Laterality: N/A;    VALVULOPLASTY, MITRAL VALVE N/A 12/1/2023    Procedure: VALVULOPLASTY, MITRAL VALVE;  Surgeon: Yuri Washington MD;  Location: [facility] OR [facility] FLR;  Service: Cardiovascular;  Laterality: N/A;     No family history on file.  Social History     Tobacco Use    Smoking status: Former     Current packs/day: 0.50     Types: Cigarettes   Substance Use Topics    Alcohol use: Yes     Comment: rarely    Drug use: No        Review of Systems:  Review of Systems   Gastrointestinal:         Anal pain       OBJECTIVE:     Vital Signs (Most Recent)  BP (!) 88/0 (BP Location: Left arm, Patient Position: Sitting)   Pulse 78   Ht 5' 9" (1.753 m)   Wt 79.8 kg (175 lb 14.8 oz)   BMI 25.98 kg/m²     Physical Exam:  General: Black or  male in no distress   Neuro: Alert and oriented to person, place, and time.  Moves all extremities.     HEENT: No icterus.  Trachea " midline  Respiratory: Respirations are even and unlabored, no cough or audible wheezing  Skin: Warm dry and intact, No visible rashes, no jaundice    Labs reviewed today:  Lab Results   Component Value Date    WBC 6.37 10/24/2024    HGB 11.9 (L) 10/24/2024    HCT 41.4 10/24/2024     10/24/2024    CHOL 143 05/23/2024    TRIG 86 05/23/2024    HDL 48 05/23/2024    ALT 30 10/24/2024    AST 38 10/24/2024     10/24/2024    K 4.3 10/24/2024    CL 98 10/24/2024    CREATININE 4.7 (H) 10/24/2024    BUN 30 (H) 10/24/2024    CO2 27 10/24/2024    TSH 0.842 05/23/2024    PSA 1.5 11/15/2023    INR 2.6 (H) 10/24/2024    HGBA1C 6.0 (H) 07/25/2024       Imaging reviewed today:  10/16/24 ct pelvis  Fluid collection along the left gluteal cleft soft tissues measuring up to 3.9 cm, compatible with anorectal abscess.   Underlying neoplasm is neither confirmed nor fully excluded on this scan.     Endoscopy reviewed today:  none    Anorectal Exam:    Anal Skin:     - MARIO small incision with clean wound base noted. No induration, erythema, fluctuance or drainage noted.      ASSESSMENT/PLAN:     Diagnoses and all orders for this visit:    LVAD (left ventricular assist device) present  -     Ambulatory referral/consult to Gastroenterology        The patient was instructed to:  F/u prn.    Pt and wife with questions regarding a screening colonoscopy.   Will message LVAD coordinator for recs.        Malina Jean, GAVINP-C  Colon and Rectal Surgery

## 2024-10-25 ENCOUNTER — DOCUMENT SCAN (OUTPATIENT)
Dept: HOME HEALTH SERVICES | Facility: HOSPITAL | Age: 64
End: 2024-10-25
Payer: MEDICAID

## 2024-10-25 NOTE — PROCEDURES
Radha Abbott is a 64 y.o.   male patient, who presents for a 6 minute walk test ordered by MD Padmini.  The diagnosis is Left Ventricular Assist Device.  The patient's BMI is 24.7 kg/m2.  Predicted distance (lower limit of normal) is 418.15 meters.      Test Results:    The test was completed with stops.  The patient stopped 1 times for a total of 25 seconds.  The total time walked was 335 seconds.  During walking, the patient reported:  Other (Comment) (leg weakness). The patient used a walker  during testing.     10/25/2024---------Distance: 137.16 meters (450 feet)     O2 Sat % Supplemental Oxygen Heart Rate Blood Pressure Pavithra Scale   Pre-exercise  (Resting) 99 % Room Air 30 bpm (!) 89/68 mmHg 1   During Exercise 95 % Room Air 89 bpm 103/74 mmHg 2   Post-exercise  (Recovery) 98 % Room Air  69 bpm   mmHg       Recovery Time: 97 seconds    The patient walked the first 15 feet in 6.63 seconds.    Performing nurse/tech: RENE Carter      PREVIOUS STUDY:   The patient had a previous study.  02/29/2024---------Distance: 182.88 meters (600 feet)       O2 Sat % Supplemental Oxygen Heart Rate Blood Pressure Pavithra Scale   Pre-exercise  (Resting) Unable to obtain Room Air 82 bpm 103/78 mmHg 0   During Exercise Unable to obtain Room Air 91 bpm 116/74 mmHg 0   Post-exercise  (Recovery) Unable to obtain Room Air  91 bpm            CLINICAL INTERPRETATION:  Six minute walk distance is 137.16 meters (450 feet) with very light dyspnea.  During exercise, there was significant desaturation while breathing room air.  Both blood pressure and heart rate remained stable with walking.  The patient reported non-pulmonary symptoms during exercise.  The patient did complete the study, walking 335 seconds of the 360 second test.  Since the previous study in 2/2024, exercise capacity may be somewhat worse.  Based upon age and body mass index, exercise capacity is less than predicted.

## 2024-10-28 PROBLEM — N18.4 ACUTE RENAL FAILURE SUPERIMPOSED ON STAGE 4 CHRONIC KIDNEY DISEASE: Status: RESOLVED | Noted: 2024-05-29 | Resolved: 2024-10-28

## 2024-10-28 PROBLEM — N17.9 ACUTE RENAL FAILURE SUPERIMPOSED ON STAGE 4 CHRONIC KIDNEY DISEASE: Status: RESOLVED | Noted: 2024-05-29 | Resolved: 2024-10-28

## 2024-11-01 ENCOUNTER — LAB VISIT (OUTPATIENT)
Dept: LAB | Facility: HOSPITAL | Age: 64
End: 2024-11-01
Attending: INTERNAL MEDICINE
Payer: MEDICAID

## 2024-11-01 ENCOUNTER — ANTI-COAG VISIT (OUTPATIENT)
Dept: CARDIOLOGY | Facility: CLINIC | Age: 64
End: 2024-11-01
Payer: MEDICAID

## 2024-11-01 DIAGNOSIS — Z95.811 PRESENCE OF VENTRICULAR ASSIST DEVICE: ICD-10-CM

## 2024-11-01 DIAGNOSIS — Z95.811 LVAD (LEFT VENTRICULAR ASSIST DEVICE) PRESENT: Primary | ICD-10-CM

## 2024-11-01 LAB
INR PPP: 3.6 (ref 0.8–1.2)
PROTHROMBIN TIME: 38.1 SEC (ref 9–12.5)

## 2024-11-01 PROCEDURE — 85610 PROTHROMBIN TIME: CPT | Performed by: INTERNAL MEDICINE

## 2024-11-01 PROCEDURE — 36415 COLL VENOUS BLD VENIPUNCTURE: CPT | Performed by: INTERNAL MEDICINE

## 2024-11-05 ENCOUNTER — ANTI-COAG VISIT (OUTPATIENT)
Dept: CARDIOLOGY | Facility: CLINIC | Age: 64
End: 2024-11-05
Payer: MEDICAID

## 2024-11-05 ENCOUNTER — DOCUMENT SCAN (OUTPATIENT)
Dept: HOME HEALTH SERVICES | Facility: HOSPITAL | Age: 64
End: 2024-11-05
Payer: MEDICAID

## 2024-11-05 ENCOUNTER — LAB VISIT (OUTPATIENT)
Dept: LAB | Facility: HOSPITAL | Age: 64
End: 2024-11-05
Attending: INTERNAL MEDICINE
Payer: MEDICAID

## 2024-11-05 DIAGNOSIS — Z95.811 LVAD (LEFT VENTRICULAR ASSIST DEVICE) PRESENT: Primary | ICD-10-CM

## 2024-11-05 DIAGNOSIS — Z95.811 PRESENCE OF VENTRICULAR ASSIST DEVICE: ICD-10-CM

## 2024-11-05 LAB
INR PPP: 3.2 (ref 0.8–1.2)
PROTHROMBIN TIME: 32.6 SEC (ref 9–12.5)

## 2024-11-05 PROCEDURE — 93793 ANTICOAG MGMT PT WARFARIN: CPT | Mod: ,,,

## 2024-11-05 PROCEDURE — 36415 COLL VENOUS BLD VENIPUNCTURE: CPT | Performed by: INTERNAL MEDICINE

## 2024-11-05 PROCEDURE — 85610 PROTHROMBIN TIME: CPT | Performed by: INTERNAL MEDICINE

## 2024-11-05 NOTE — PROGRESS NOTES
[facility]    11/05/2024 10:54 AM    Assessment/Plan:    Patient presents today with supratherapeutic INR.    Assessment of patient findings and chart review: no significant findings     Recommendation for patient's warfarin regimen: Lower dose today to 1.25mg then resume current maintenance dose    Recommend repeat INR Thursday  _________________________________________________________________    Radha Abbott (64 y.o.) is followed by the [facility].    Anticoagulation Summary  As of 11/5/2024      INR goal:  2.0-3.0   TTR:  63.1% (6.4 mo)   INR used for dosing:  3.2 (11/5/2024)   Warfarin maintenance plan:  1.25 mg (2.5 mg x 0.5) every Mon, Fri; 2.5 mg (2.5 mg x 1) all other days   Weekly warfarin total:  15 mg   Plan last modified:  Joycelyn Angel, PharmD (11/1/2024)   Next INR check:  11/7/2024   Target end date:  --    Indications    LVAD (left ventricular assist device) present [Z95.811]                 Anticoagulation Episode Summary       INR check location:  --    Preferred lab:  --    Send INR reminders to:  [facility] COUMADIN LVAD    Comments:  [facility] ( [phone], fax [phone])  // [facility]          Anticoagulation Care Providers       Provider Role Specialty Phone number    Sajan Hurley MD [facility] Cardiology [phone]

## 2024-11-07 ENCOUNTER — DOCUMENT SCAN (OUTPATIENT)
Dept: HOME HEALTH SERVICES | Facility: HOSPITAL | Age: 64
End: 2024-11-07
Payer: MEDICAID

## 2024-11-07 ENCOUNTER — ANTI-COAG VISIT (OUTPATIENT)
Dept: CARDIOLOGY | Facility: CLINIC | Age: 64
End: 2024-11-07
Payer: MEDICAID

## 2024-11-07 ENCOUNTER — LAB VISIT (OUTPATIENT)
Dept: LAB | Facility: HOSPITAL | Age: 64
End: 2024-11-07
Attending: INTERNAL MEDICINE
Payer: MEDICAID

## 2024-11-07 DIAGNOSIS — Z95.811 PRESENCE OF VENTRICULAR ASSIST DEVICE: ICD-10-CM

## 2024-11-07 DIAGNOSIS — Z95.811 LVAD (LEFT VENTRICULAR ASSIST DEVICE) PRESENT: Primary | ICD-10-CM

## 2024-11-07 LAB
INR PPP: 2.3 (ref 0.8–1.2)
PROTHROMBIN TIME: 24.8 SEC (ref 9–12.5)

## 2024-11-07 PROCEDURE — 36415 COLL VENOUS BLD VENIPUNCTURE: CPT | Performed by: INTERNAL MEDICINE

## 2024-11-07 PROCEDURE — 93793 ANTICOAG MGMT PT WARFARIN: CPT | Mod: ,,,

## 2024-11-07 PROCEDURE — 85610 PROTHROMBIN TIME: CPT | Performed by: INTERNAL MEDICINE

## 2024-11-07 NOTE — PROGRESS NOTES
[facility]    2024 12:31 PM    Assessment/Plan:    Patient presents today with therapeutic INR.    Assessment of patient findings and chart review: no significant findings     Recommendation for patient's warfarin regimen: Continue current maintenance dose    Recommend repeat INR Monday  _________________________________________________________________    Radha Abbott (64 y.o.) is followed by the [facility].    Anticoagulation Summary  As of 2024      INR goal:  2.0-3.0   TTR:  63.2% (6.5 mo)   INR used for dosin.3 (2024)   Warfarin maintenance plan:  1.25 mg (2.5 mg x 0.5) every Mon, Fri; 2.5 mg (2.5 mg x 1) all other days   Weekly warfarin total:  15 mg   Plan last modified:  Joycelyn Angel, PharmD (2024)   Next INR check:  2024   Target end date:  --    Indications    LVAD (left ventricular assist device) present [Z95.811]                 Anticoagulation Episode Summary       INR check location:  --    Preferred lab:  --    Send INR reminders to:  [facility] COUMADIN LVAD    Comments:  [facility]  (ph [phone], fax [phone])  // [employer]          Anticoagulation Care Providers       Provider Role Specialty Phone number    Sajan Hurley MD [facility] Cardiology [phone]

## 2024-11-07 NOTE — PROGRESS NOTES
Pt wife advised on dosing per calendar and INR testing date of 11/11/2024. STAT INR orders sent to [facility] @ [phone] .

## 2024-11-08 ENCOUNTER — PATIENT MESSAGE (OUTPATIENT)
Dept: TRANSPLANT | Facility: CLINIC | Age: 64
End: 2024-11-08
Payer: MEDICAID

## 2024-11-11 ENCOUNTER — DOCUMENT SCAN (OUTPATIENT)
Dept: HOME HEALTH SERVICES | Facility: HOSPITAL | Age: 64
End: 2024-11-11
Payer: MEDICAID

## 2024-11-12 ENCOUNTER — ANTI-COAG VISIT (OUTPATIENT)
Dept: CARDIOLOGY | Facility: CLINIC | Age: 64
End: 2024-11-12
Payer: MEDICAID

## 2024-11-12 ENCOUNTER — LAB VISIT (OUTPATIENT)
Dept: LAB | Facility: HOSPITAL | Age: 64
End: 2024-11-12
Attending: INTERNAL MEDICINE
Payer: MEDICAID

## 2024-11-12 DIAGNOSIS — Z95.811 PRESENCE OF VENTRICULAR ASSIST DEVICE: ICD-10-CM

## 2024-11-12 DIAGNOSIS — Z95.811 LVAD (LEFT VENTRICULAR ASSIST DEVICE) PRESENT: Primary | ICD-10-CM

## 2024-11-12 LAB
INR PPP: 2.5 (ref 0.8–1.2)
PROTHROMBIN TIME: 25.9 SEC (ref 9–12.5)

## 2024-11-12 PROCEDURE — 85610 PROTHROMBIN TIME: CPT | Performed by: INTERNAL MEDICINE

## 2024-11-12 PROCEDURE — 36415 COLL VENOUS BLD VENIPUNCTURE: CPT | Performed by: INTERNAL MEDICINE

## 2024-11-12 PROCEDURE — 93793 ANTICOAG MGMT PT WARFARIN: CPT | Mod: ,,,

## 2024-11-12 NOTE — PROGRESS NOTES
[facility]    11/12/2024    Radha Abbott (64 y.o.) is followed by the [facility].      Assessment/Plan:    Radha Abbott presents with a therapeutic INR. Goal INR: 2.0-3.0    Lab Results   Component Value Date    INR 2.5 (H) 11/12/2024    INR 2.3 (H) 11/07/2024    INR 3.2 (H) 11/05/2024       Assessment of patient findings per MA/LPN and chart review:   The following significant findings were found:   None    Recommendation for patient's warfarin regimen:   No change was made to warfarin therapy during this visit and patient has been instructed to continue their current warfarin regimen.    Recommended repeat INR in 1 week      Sonali KruegerD, BCPS  Clinical Pharmacist - [facility]  Preferred Contact: Secure Messaging or In Basket Message

## 2024-11-15 ENCOUNTER — DOCUMENT SCAN (OUTPATIENT)
Dept: HOME HEALTH SERVICES | Facility: HOSPITAL | Age: 64
End: 2024-11-15
Payer: MEDICAID

## 2024-11-19 ENCOUNTER — LAB VISIT (OUTPATIENT)
Dept: LAB | Facility: HOSPITAL | Age: 64
End: 2024-11-19
Attending: INTERNAL MEDICINE
Payer: MEDICAID

## 2024-11-19 ENCOUNTER — ANTI-COAG VISIT (OUTPATIENT)
Dept: CARDIOLOGY | Facility: CLINIC | Age: 64
End: 2024-11-19
Payer: MEDICAID

## 2024-11-19 ENCOUNTER — TELEPHONE (OUTPATIENT)
Dept: TRANSPLANT | Facility: CLINIC | Age: 64
End: 2024-11-19
Payer: MEDICAID

## 2024-11-19 DIAGNOSIS — Z95.811 PRESENCE OF VENTRICULAR ASSIST DEVICE: ICD-10-CM

## 2024-11-19 DIAGNOSIS — Z95.811 LVAD (LEFT VENTRICULAR ASSIST DEVICE) PRESENT: Primary | ICD-10-CM

## 2024-11-19 LAB
INR PPP: 2.2 (ref 0.8–1.2)
PROTHROMBIN TIME: 24 SEC (ref 9–12.5)

## 2024-11-19 PROCEDURE — 36415 COLL VENOUS BLD VENIPUNCTURE: CPT | Performed by: INTERNAL MEDICINE

## 2024-11-19 PROCEDURE — 85610 PROTHROMBIN TIME: CPT | Performed by: INTERNAL MEDICINE

## 2024-11-19 PROCEDURE — 93793 ANTICOAG MGMT PT WARFARIN: CPT | Mod: ,,,

## 2024-11-19 NOTE — PROGRESS NOTES
[facility]    2024 10:27 AM    Assessment/Plan:    Patient presents today with therapeutic INR.    Assessment of patient findings and chart review: no significant findings     Recommendation for patient's warfarin regimen: Continue current maintenance dose    Recommend repeat INR Thursday  _________________________________________________________________    Radha Abbott (64 y.o.) is followed by the [facility].    Anticoagulation Summary  As of 2024      INR goal:  2.0-3.0   TTR:  65.3% (6.9 mo)   INR used for dosin.2 (2024)   Warfarin maintenance plan:  1.25 mg (2.5 mg x 0.5) every Mon, Fri; 2.5 mg (2.5 mg x 1) all other days   Weekly warfarin total:  15 mg   Plan last modified:  Joycelyn Angel, PharmD (2024)   Next INR check:  2024   Target end date:  --    Indications    LVAD (left ventricular assist device) present [Z95.811]                 Anticoagulation Episode Summary       INR check location:  --    Preferred lab:  --    Send INR reminders to:  [facility] COUMADIN LVAD    Comments:  Dc'd from [facility] as of 2024 per wifeArlyn. [facility] (ph [phone], fax [phone])  // [facility]          Anticoagulation Care Providers       Provider Role Specialty Phone number    Sajan Hurley MD [facility] Cardiology [phone]

## 2024-11-19 NOTE — TELEPHONE ENCOUNTER
VAD Contact: Called Spouse regarding equipment maintenance due at upcoming clinic appointment on 11/21.  Requested Spouse to bring Battery Charger ([facility]) and Emergency Bag (2 batteries, 2 clips, 1 backup controller) with them to next appointment for maintenance.  Spouse verbalized understanding and agreement.    It is medically necessary to ensure patient has properly functioning equipment and wearables to prevent infection, injury or death to patient.

## 2024-11-21 ENCOUNTER — OFFICE VISIT (OUTPATIENT)
Dept: TRANSPLANT | Facility: CLINIC | Age: 64
End: 2024-11-21
Payer: MEDICAID

## 2024-11-21 ENCOUNTER — ANTI-COAG VISIT (OUTPATIENT)
Dept: CARDIOLOGY | Facility: CLINIC | Age: 64
End: 2024-11-21
Payer: MEDICAID

## 2024-11-21 ENCOUNTER — CLINICAL SUPPORT (OUTPATIENT)
Dept: TRANSPLANT | Facility: CLINIC | Age: 64
End: 2024-11-21
Payer: MEDICAID

## 2024-11-21 ENCOUNTER — LAB VISIT (OUTPATIENT)
Dept: LAB | Facility: HOSPITAL | Age: 64
End: 2024-11-21
Payer: MEDICAID

## 2024-11-21 VITALS
BODY MASS INDEX: 25.62 KG/M2 | SYSTOLIC BLOOD PRESSURE: 84 MMHG | HEART RATE: 80 BPM | HEIGHT: 69 IN | TEMPERATURE: 98 F | WEIGHT: 173 LBS

## 2024-11-21 DIAGNOSIS — N18.6 CKD (CHRONIC KIDNEY DISEASE) STAGE V REQUIRING CHRONIC DIALYSIS: ICD-10-CM

## 2024-11-21 DIAGNOSIS — I50.42 CHRONIC COMBINED SYSTOLIC AND DIASTOLIC CHF, NYHA CLASS 4: Chronic | ICD-10-CM

## 2024-11-21 DIAGNOSIS — Z95.811 LVAD (LEFT VENTRICULAR ASSIST DEVICE) PRESENT: ICD-10-CM

## 2024-11-21 DIAGNOSIS — Z99.2 DIALYSIS PATIENT: ICD-10-CM

## 2024-11-21 DIAGNOSIS — Z91.89 AT RISK FOR AMIODARONE TOXICITY WITH LONG TERM USE: ICD-10-CM

## 2024-11-21 DIAGNOSIS — M89.9 CHRONIC KIDNEY DISEASE-MINERAL AND BONE DISORDER: ICD-10-CM

## 2024-11-21 DIAGNOSIS — Z95.811 PRESENCE OF VENTRICULAR ASSIST DEVICE: Primary | ICD-10-CM

## 2024-11-21 DIAGNOSIS — Z99.2 CKD (CHRONIC KIDNEY DISEASE) STAGE V REQUIRING CHRONIC DIALYSIS: ICD-10-CM

## 2024-11-21 DIAGNOSIS — Z95.811 LVAD (LEFT VENTRICULAR ASSIST DEVICE) PRESENT: Primary | ICD-10-CM

## 2024-11-21 DIAGNOSIS — N18.9 CHRONIC KIDNEY DISEASE-MINERAL AND BONE DISORDER: ICD-10-CM

## 2024-11-21 DIAGNOSIS — E83.9 CHRONIC KIDNEY DISEASE-MINERAL AND BONE DISORDER: ICD-10-CM

## 2024-11-21 DIAGNOSIS — E78.5 HYPERLIPIDEMIA, UNSPECIFIED HYPERLIPIDEMIA TYPE: ICD-10-CM

## 2024-11-21 DIAGNOSIS — I48.0 PAF (PAROXYSMAL ATRIAL FIBRILLATION): ICD-10-CM

## 2024-11-21 DIAGNOSIS — Z95.811 PRESENCE OF VENTRICULAR ASSIST DEVICE: ICD-10-CM

## 2024-11-21 DIAGNOSIS — Z79.899 AT RISK FOR AMIODARONE TOXICITY WITH LONG TERM USE: ICD-10-CM

## 2024-11-21 DIAGNOSIS — Z95.811 HEART REPLACED BY HEART ASSIST DEVICE: Primary | ICD-10-CM

## 2024-11-21 PROBLEM — I50.9 CONGESTIVE HEART FAILURE: Status: RESOLVED | Noted: 2023-12-09 | Resolved: 2024-11-21

## 2024-11-21 PROBLEM — R06.02 SHORTNESS OF BREATH: Status: RESOLVED | Noted: 2023-12-18 | Resolved: 2024-11-21

## 2024-11-21 PROBLEM — I50.9 ACUTE DECOMPENSATED HEART FAILURE: Status: RESOLVED | Noted: 2024-03-11 | Resolved: 2024-11-21

## 2024-11-21 PROBLEM — R78.81 BACTEREMIA: Status: RESOLVED | Noted: 2024-04-17 | Resolved: 2024-11-21

## 2024-11-21 PROBLEM — I50.43 ACUTE ON CHRONIC COMBINED SYSTOLIC AND DIASTOLIC HEART FAILURE: Status: RESOLVED | Noted: 2023-10-07 | Resolved: 2024-11-21

## 2024-11-21 LAB
ALBUMIN SERPL BCP-MCNC: 2.6 G/DL (ref 3.5–5.2)
ALP SERPL-CCNC: 102 U/L (ref 40–150)
ALT SERPL W/O P-5'-P-CCNC: 32 U/L (ref 10–44)
ANION GAP SERPL CALC-SCNC: 10 MMOL/L (ref 8–16)
AST SERPL-CCNC: 35 U/L (ref 10–40)
BASOPHILS # BLD AUTO: 0.05 K/UL (ref 0–0.2)
BASOPHILS NFR BLD: 0.6 % (ref 0–1.9)
BILIRUB DIRECT SERPL-MCNC: 0.2 MG/DL (ref 0.1–0.3)
BILIRUB SERPL-MCNC: 0.3 MG/DL (ref 0.1–1)
BNP SERPL-MCNC: 521 PG/ML (ref 0–99)
BUN SERPL-MCNC: 20 MG/DL (ref 8–23)
CALCIUM SERPL-MCNC: 8.6 MG/DL (ref 8.7–10.5)
CHLORIDE SERPL-SCNC: 101 MMOL/L (ref 95–110)
CHOLEST SERPL-MCNC: 108 MG/DL (ref 120–199)
CHOLEST/HDLC SERPL: 2.1 {RATIO} (ref 2–5)
CO2 SERPL-SCNC: 25 MMOL/L (ref 23–29)
CREAT SERPL-MCNC: 3.7 MG/DL (ref 0.5–1.4)
CRP SERPL-MCNC: 96.2 MG/L (ref 0–8.2)
DIFFERENTIAL METHOD BLD: ABNORMAL
EOSINOPHIL # BLD AUTO: 0.3 K/UL (ref 0–0.5)
EOSINOPHIL NFR BLD: 2.8 % (ref 0–8)
ERYTHROCYTE [DISTWIDTH] IN BLOOD BY AUTOMATED COUNT: 17.2 % (ref 11.5–14.5)
EST. GFR  (NO RACE VARIABLE): 17.7 ML/MIN/1.73 M^2
GLUCOSE SERPL-MCNC: 151 MG/DL (ref 70–110)
HCT VFR BLD AUTO: 37.4 % (ref 40–54)
HDLC SERPL-MCNC: 52 MG/DL (ref 40–75)
HDLC SERPL: 48.1 % (ref 20–50)
HGB BLD-MCNC: 11.2 G/DL (ref 14–18)
IMM GRANULOCYTES # BLD AUTO: 0.05 K/UL (ref 0–0.04)
IMM GRANULOCYTES NFR BLD AUTO: 0.6 % (ref 0–0.5)
INR PPP: 2.4 (ref 0.8–1.2)
LDH SERPL L TO P-CCNC: 339 U/L (ref 110–260)
LDLC SERPL CALC-MCNC: 34.4 MG/DL (ref 63–159)
LYMPHOCYTES # BLD AUTO: 0.7 K/UL (ref 1–4.8)
LYMPHOCYTES NFR BLD: 7.2 % (ref 18–48)
MAGNESIUM SERPL-MCNC: 1.8 MG/DL (ref 1.6–2.6)
MCH RBC QN AUTO: 25.5 PG (ref 27–31)
MCHC RBC AUTO-ENTMCNC: 29.9 G/DL (ref 32–36)
MCV RBC AUTO: 85 FL (ref 82–98)
MONOCYTES # BLD AUTO: 0.5 K/UL (ref 0.3–1)
MONOCYTES NFR BLD: 5 % (ref 4–15)
NEUTROPHILS # BLD AUTO: 7.6 K/UL (ref 1.8–7.7)
NEUTROPHILS NFR BLD: 83.8 % (ref 38–73)
NONHDLC SERPL-MCNC: 56 MG/DL
NRBC BLD-RTO: 0 /100 WBC
PHOSPHATE SERPL-MCNC: 2.8 MG/DL (ref 2.7–4.5)
PLATELET # BLD AUTO: 199 K/UL (ref 150–450)
PMV BLD AUTO: 9.7 FL (ref 9.2–12.9)
POTASSIUM SERPL-SCNC: 3.7 MMOL/L (ref 3.5–5.1)
PREALB SERPL-MCNC: 20 MG/DL (ref 20–43)
PROT SERPL-MCNC: 7.5 G/DL (ref 6–8.4)
PROTHROMBIN TIME: 24.4 SEC (ref 9–12.5)
RBC # BLD AUTO: 4.39 M/UL (ref 4.6–6.2)
SODIUM SERPL-SCNC: 136 MMOL/L (ref 136–145)
T4 SERPL-MCNC: 5.9 UG/DL (ref 4.5–11.5)
TRIGL SERPL-MCNC: 108 MG/DL (ref 30–150)
TSH SERPL DL<=0.005 MIU/L-ACNC: 0.97 UIU/ML (ref 0.4–4)
WBC # BLD AUTO: 9.07 K/UL (ref 3.9–12.7)

## 2024-11-21 PROCEDURE — 84100 ASSAY OF PHOSPHORUS: CPT | Performed by: INTERNAL MEDICINE

## 2024-11-21 PROCEDURE — 80061 LIPID PANEL: CPT | Performed by: INTERNAL MEDICINE

## 2024-11-21 PROCEDURE — 84436 ASSAY OF TOTAL THYROXINE: CPT | Performed by: INTERNAL MEDICINE

## 2024-11-21 PROCEDURE — 85025 COMPLETE CBC W/AUTO DIFF WBC: CPT | Performed by: INTERNAL MEDICINE

## 2024-11-21 PROCEDURE — 83615 LACTATE (LD) (LDH) ENZYME: CPT | Performed by: INTERNAL MEDICINE

## 2024-11-21 PROCEDURE — 86140 C-REACTIVE PROTEIN: CPT | Performed by: INTERNAL MEDICINE

## 2024-11-21 PROCEDURE — 99215 OFFICE O/P EST HI 40 MIN: CPT | Mod: PBBFAC | Performed by: INTERNAL MEDICINE

## 2024-11-21 PROCEDURE — 99999 PR PBB SHADOW E&M-EST. PATIENT-LVL V: CPT | Mod: PBBFAC,,, | Performed by: INTERNAL MEDICINE

## 2024-11-21 PROCEDURE — 84134 ASSAY OF PREALBUMIN: CPT | Performed by: INTERNAL MEDICINE

## 2024-11-21 PROCEDURE — 85610 PROTHROMBIN TIME: CPT | Performed by: INTERNAL MEDICINE

## 2024-11-21 PROCEDURE — 83880 ASSAY OF NATRIURETIC PEPTIDE: CPT | Performed by: INTERNAL MEDICINE

## 2024-11-21 PROCEDURE — 84443 ASSAY THYROID STIM HORMONE: CPT | Performed by: INTERNAL MEDICINE

## 2024-11-21 PROCEDURE — 83735 ASSAY OF MAGNESIUM: CPT | Performed by: INTERNAL MEDICINE

## 2024-11-21 PROCEDURE — 36415 COLL VENOUS BLD VENIPUNCTURE: CPT | Performed by: INTERNAL MEDICINE

## 2024-11-21 PROCEDURE — 80053 COMPREHEN METABOLIC PANEL: CPT | Performed by: INTERNAL MEDICINE

## 2024-11-21 PROCEDURE — 82248 BILIRUBIN DIRECT: CPT | Performed by: INTERNAL MEDICINE

## 2024-11-21 NOTE — PROGRESS NOTES
Advanced Heart Failure and Transplantation Clinic Follow up.    Attending Physician: Sajan Cassa MD.  The patient's last visit with me was on 2/8/2024.         HPI.  Mr. Radha Abbott is a 63 yo male with stage D HFrEF, ICMP who underwent HM3 placement placed by Dr Washington on 12/1/23. Lengthy post op course complicated by vasoplegia(requiring Giaprezza), debility, malnutrition/impaired swallowing, and renal failure requiring HD (since weaned off). Once medically stable, he was transferred to Rehab for further PT/OT/ST. He had done well for a hile but then had several clinical events including; worsening renal failure and  COVID/pneumonia. Was discharged and readmitted again for C diff for which he was successfully treated. He is on HD now on MWF (3 hours sessions).     November 21, 2024: patient feels better. He is going to outpatient rehab twice a week. He feels he is gradually getting stronger. He continues to complain of tingling in feet.       Review of Systems   Constitutional:  Negative for activity change, appetite change, chills, diaphoresis, fatigue and fever.   HENT:  Negative for nasal congestion, rhinorrhea and sore throat.    Eyes:  Negative for visual disturbance.   Respiratory:  Negative for cough, choking, chest tightness and shortness of breath.    Cardiovascular:  Negative for chest pain, palpitations and leg swelling.   Gastrointestinal:  Negative for abdominal pain, diarrhea, nausea and vomiting.   Genitourinary:  Negative for difficulty urinating, dysuria and hematuria.   Integumentary:  Negative for rash.   Neurological:  Positive for numbness. Negative for seizures, syncope and light-headedness.   Psychiatric/Behavioral:  Negative for agitation and hallucinations.         Past Medical History:   Diagnosis Date    Aspiration pneumonia of both lungs 06/17/2024    CAD (coronary artery disease)     CHF  (congestive heart failure)     Delirium 06/16/2024    Diabetes mellitus     HFrEF (heart failure with reduced ejection fraction)     Hypoglycemia 06/12/2024    ICD (implantable cardioverter-defibrillator) in place     LVAD (left ventricular assist device) present 12/01/2023    MI, old     PAF (paroxysmal atrial fibrillation) 10/11/2023    Stage 4 chronic kidney disease 02/15/2024    Type 2 diabetes mellitus without complication, without long-term current use of insulin 10/07/2023        Past Surgical History:   Procedure Laterality Date    ANGIOPLASTY-VENOUS ARTERY Right 12/1/2023    Procedure: ANGIOPLASTY-VENOUS ARTERY, RIGHT FEMORAL;  Surgeon: Yuir Washington MD;  [facility];  Service: Cardiovascular;  Laterality: Right;    AORTIC VALVULOPLASTY N/A 12/1/2023    Procedure: REPAIR, AORTIC VALVE;  Surgeon: Yuri Washington MD;  [facility];  Service: Cardiovascular;  Laterality: N/A;    CARDIAC SURGERY      CLOSURE N/A 12/1/2023    Procedure: CLOSURE, TEMPORARY;  Surgeon: Yuri Washington MD;  [facility];  Service: Cardiovascular;  Laterality: N/A;    DRAINAGE OF PLEURAL EFFUSION  12/4/2023    Procedure: DRAINAGE, PLEURAL EFFUSION;  Surgeon: Yuri Washington MD;  [facility];  Service: Cardiovascular;;    INSERTION OF GRAFT TO PERICARDIUM  12/4/2023    Procedure: INSERTION, GRAFT, PERICARDIUM;  Surgeon: Yuri Washington MD;  [facility];  Service: Cardiovascular;;    INSERTION OF INTRA-AORTIC BALLOON ASSIST DEVICE Right 11/21/2023    Procedure: INSERTION, INTRA-AORTIC BALLOON PUMP;  Surgeon: Finn Cohn MD;  Location: [facility] CATH LAB;  Service: Cardiology;  Laterality: Right;    LEFT VENTRICULAR ASSIST DEVICE Left 12/1/2023    Procedure: INSERTION-LEFT VENTRICULAR ASSIST DEVICE;  Surgeon: Yuri Washington MD;  [facility];  Service: Cardiovascular;  Laterality: Left;  REDO STERNOTOMY - REDO SAW NEEDED FOR CASE    LYSIS OF  ADHESIONS  12/1/2023    Procedure: LYSIS, ADHESIONS;  Surgeon: Yuri Washington MD;  Location: [facility] OR [facility];  Service: Cardiovascular;;    PLACEMENT OF SWAN ROLANDO CATHETER WITH IMAGING GUIDANCE  11/20/2023    Procedure: INSERTION, CATHETER, SWAN-ROLANDO, WITH IMAGING GUIDANCE;  Surgeon: Sajan Hurley MD;  Location: [facility] CATH LAB;  Service: Cardiology;;    REMOVAL OF TUNNELED CENTRAL VENOUS CATHETER (CVC) N/A 3/1/2024    Procedure: REMOVAL, CATHETER, CENTRAL VENOUS, TUNNELED;  Surgeon: Seble Aguilar MD;  Location: [facility] CATH LAB;  Service: Interventional Nephrology;  Laterality: N/A;    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 12/1/2023    Procedure: REPAIR, ANEURYSM, ARTERY, FEMORAL;  Surgeon: Yuri Washington MD;  [facility];  Service: Cardiovascular;  Laterality: Right;  Right Femoral Artery Repair    RIGHT HEART CATHETERIZATION Right 10/10/2023    Procedure: INSERTION, CATHETER, RIGHT HEART;  Surgeon: Bin Gandhi MD;  Location: [facility] CATH LAB;  Service: Cardiology;  Laterality: Right;    RIGHT HEART CATHETERIZATION Right 10/13/2023    Procedure: INSERTION, CATHETER, RIGHT HEART;  Surgeon: Walter Mcintyre MD;  Location: [facility] CATH LAB;  Service: Cardiology;  Laterality: Right;    RIGHT HEART CATHETERIZATION  11/13/2023    RIGHT HEART CATHETERIZATION Right 11/13/2023    Procedure: INSERTION, CATHETER, RIGHT HEART;  Surgeon: Juventino Bermudez Jr., MD;  Location: [facility] CATH LAB;  Service: Cardiology;  Laterality: Right;    RIGHT HEART CATHETERIZATION Right 11/20/2023    Procedure: INSERTION, CATHETER, RIGHT HEART;  Surgeon: Sajan Hurley MD;  Location: [facility] CATH LAB;  Service: Cardiology;  Laterality: Right;    RIGHT HEART CATHETERIZATION Right 1/22/2024    Procedure: INSERTION, CATHETER, RIGHT HEART;  Surgeon: Brayan Ocampo MD;  Location: [facility] CATH LAB;  Service: Cardiology;  Laterality: Right;    STERNAL WOUND CLOSURE N/A 12/4/2023    Procedure: CLOSURE, WOUND, STERNUM;   Surgeon: Yuri Washington MD;  Location: [facility] OR [address] [place] [address];  Service: Cardiovascular;  Laterality: N/A;    STERNOTOMY N/A 12/1/2023    Procedure: STERNOTOMY, REDO;  Surgeon: Yuri Washington MD;  [facility];  Service: Cardiovascular;  Laterality: N/A;    VALVULOPLASTY, MITRAL VALVE N/A 12/1/2023    Procedure: VALVULOPLASTY, MITRAL VALVE;  Surgeon: Yuri Washington MD;  [facility];  Service: Cardiovascular;  Laterality: N/A;        No family history on file.     Review of patient's allergies indicates:  No Known Allergies     Current Outpatient Medications   Medication Instructions    amiodarone (PACERONE) 400 mg, Oral, Daily    atorvastatin (LIPITOR) 40 mg, Oral, Nightly    gabapentin (NEURONTIN) 300 mg, Nightly    omega-3 acid ethyl esters (LOVAZA) 2 g, Oral, 2 times daily    oxyCODONE (ROXICODONE) 5 mg, Oral, Every 4 hours PRN    pantoprazole (PROTONIX) 40 mg, Oral, Daily    pulse oximeter (PULSE OXIMETER) device Apply Externally, 2 times daily, Use twice daily at 8 AM and 3 PM and record the value in MyChart as directed.    senna-docusate 8.6-50 mg (PERICOLACE) 8.6-50 mg per tablet 2 tablets, Oral, Daily PRN    traZODone (DESYREL) 25 mg, Oral, Nightly    venlafaxine (EFFEXOR) 37.5 mg, Oral, Daily    vitamin D (VITAMIN D3) 1,000 Units, Oral, Daily    warfarin (COUMADIN) 2.5 mg, Oral, Daily        Vitals:    11/21/24 1016   BP: (!) 84/0   Pulse:    Temp:         Wt Readings from Last 3 Encounters:   11/21/24 78.5 kg (173 lb)   10/24/24 75.8 kg (167 lb)   10/24/24 79.8 kg (175 lb 14.8 oz)     Temp Readings from Last 3 Encounters:   11/21/24 98 °F (36.7 °C) (Oral)   10/24/24 98.5 °F (36.9 °C) (Oral)   10/17/24 98.9 °F (37.2 °C) (Oral)     BP Readings from Last 3 Encounters:   11/21/24 (!) 84/0   10/24/24 (!) 88/0   10/24/24 (!) 88/0     Pulse Readings from Last 3 Encounters:   11/21/24 80   10/24/24 78   10/24/24 78        Body mass index is 25.55 kg/m². Estimated body surface area is 1.95  "meters squared as calculated from the following:    Height as of this encounter: 5' 9" (1.753 m).    Weight as of this encounter: 78.5 kg (173 lb).     Physical Exam  Constitutional:       Appearance: He is well-developed.   HENT:      Head: Normocephalic and atraumatic.      Right Ear: External ear normal.      Left Ear: External ear normal.   Eyes:      Conjunctiva/sclera: Conjunctivae normal.      Pupils: Pupils are equal, round, and reactive to light.   Neck:      Vascular: No hepatojugular reflux or JVD.   Cardiovascular:      Rate and Rhythm: Normal rate and regular rhythm.      Pulses: Intact distal pulses.      Heart sounds: No murmur heard.     No friction rub. No gallop.      Comments: Normal LVAD hum  Pulmonary:      Effort: Pulmonary effort is normal.      Breath sounds: Normal breath sounds.   Abdominal:      General: Bowel sounds are normal. There is no distension.      Palpations: Abdomen is soft.      Tenderness: There is no abdominal tenderness. There is no guarding or rebound.   Musculoskeletal:      Cervical back: Normal range of motion and neck supple.      Right lower leg: No edema.      Left lower leg: No edema.   Neurological:      Mental Status: He is alert and oriented to person, place, and time.          Lab Results   Component Value Date     (H) 11/21/2024     11/21/2024    K 3.7 11/21/2024    MG 1.8 11/21/2024     11/21/2024    CO2 25 11/21/2024    BUN 20 11/21/2024    CREATININE 3.7 (H) 11/21/2024     (H) 11/21/2024    HGBA1C 6.0 (H) 07/25/2024    AST 35 11/21/2024    ALT 32 11/21/2024    ALBUMIN 2.6 (L) 11/21/2024    PROT 7.5 11/21/2024    BILITOT 0.3 11/21/2024    WBC 9.07 11/21/2024    HGB 11.2 (L) 11/21/2024    HCT 37.4 (L) 11/21/2024    HCT 39 07/23/2024     11/21/2024    INR 2.4 (H) 11/21/2024    INR 2.6 06/07/2024     (H) 11/21/2024    TSH 0.842 05/23/2024    CHOL 108 (L) 11/21/2024    HDL 52 11/21/2024    LDLCALC 34.4 (L) 11/21/2024    TRIG " 108 11/21/2024    O8DRVRF 6.4 05/23/2024       Results for orders placed during the hospital encounter of 06/11/24    Echo    Interpretation Summary    Left Ventricle: The left ventricle is mildly dilated. Mildly increased ventricular mass. Normal wall thickness. There is mild eccentric hypertrophy. Severe global hypokinesis and regional wall motion abnormalities present. See diagram for wall motion findings. Septal motion is abnormal. There is severely reduced systolic function with a visually estimated ejection fraction of 5 - 10%. Ejection fraction by visual approximation is 8%.    Right Ventricle: Right ventricle was not well visualized due to poor acoustic window. Normal right ventricular cavity size. Wall thickness is normal. Systolic function is moderately reduced. Pacemaker lead present in the ventricle.    Left Atrium: Left atrium is severely dilated.    Right Atrium: Right atrium is mildly dilated. Lead present in the right atrium.    Aortic Valve: There is moderate aortic valve sclerosis. There is moderate annular calcification present. Severely restricted motion.    Mitral Valve: There is bileaflet sclerosis. There is mild mitral annular calcification present.    Tricuspid Valve: There is mild to moderate regurgitation.    Pulmonary Artery: The estimated pulmonary artery systolic pressure is 36 mmHg.    IVC/SVC: Intermediate venous pressure at 8 mmHg.    LVAD: The aortic valve does not open. The ventricular septum bows right.HMIII [id number]        Results for orders placed during the hospital encounter of 03/01/24    Cardiac catheterization    Narrative  Procedure performed in the Invasive Lab  - See Procedure Log link below for nursing documentation  - See OpNote on Surgeries Tab for physician findings  - See Imaging Tab for radiologist dictation         Assessment and Plan:  Presence of ventricular assist device  -     LVAD Interrogations Out Patient Only          Patient is now NYHA class II     Owing to  his advanced kidney disease and being on dialysis, he is not on ACEI/ARB/ARNI or MRA. He is also not on a beta blocker due to RV dysfunction.         VAD interrogation was performed in clinic  Any VAD management kits dispensed today medically necessary  Recommend 2 gram sodium restriction and 1500cc fluid restriction.  Encourage physical activity with graded exercise program.  Requested patient to weigh themselves daily, and to notify us if their weight increases by more than 3 lbs in 1 day or 5 lbs in 1 week.        Listed for transplant: No.     UNOS Patient Status  Functional Status: 40% - Disabled: requires special care and assistance  Physical Capacity: No Limitations  Working for Income: No  If no, reason not working: Demands of Treatment

## 2024-11-21 NOTE — PROGRESS NOTES
Date of Implant with Heartmate 3 LVAD: 12/1/23    PATIENT ARRIVED IN CLINIC:  Ambulatory with walked  Accompanied by: wife  Complaints/reason for visit today: routine    Vitals  Temperature, oral:   Temp Readings from Last 1 Encounters:   11/21/24 98 °F (36.7 °C) (Oral)     Blood Pressure:   BP Readings from Last 3 Encounters:   11/21/24 (!) 84/0   10/24/24 (!) 88/0   10/24/24 (!) 88/0        VAD Interrogation:      11/21/2024    10:30 AM 10/24/2024    10:09 AM 10/16/2024     7:43 PM   TXP PINA INTERROGATIONS   Type HeartMate3 HeartMate3    Flow 3.5 3.7    Speed 5000 5050    PI 8.6 5.2    Power ([place]rington) 3.5 2.5    LSL 4600 4600    Pulsatility Pulse No Pulse Intermittent pulse     HCT:   Lab Results   Component Value Date    HCT 37.4 (L) 11/21/2024    HCT 39 07/23/2024       VAD Assessment  Problems / Issues /Equipment Needs/ Alarms with VAD if any: None noted  VAD Sounds: HM3 Smooth  VAD Binder With Patient: No  Reviewed VAD Numbers In Binder: No  Emergency Equipment With Patient: Yes   Equipment Needs: Yes   It is medically necessary to ensure patient has properly functioning equipment and wearables to prevent infection, injury or death to patient.     DLES Assessment and Dressing Care:  Appearance of DLES: 1  Antibiotics: NO  Velour: No  Manual & Visual Inspection Of Driveline: No kinks or tears noted  Stabilization Device In Use: yes, goodman securement device    Heartmate 3 Module Cable:  No yellow exposed and Attempted to unscrew modular cable to ensure it will be able to come lose in the event we ever need to change the modular cable while patient held the driveline in place so it would not move. Modular cable connection able to be unscrewed and re-tightened. Instructed pt to perform this weekly.  Frequency of Dressing Changes: weekly & silverlon kit   Pt In Need Of Management Kits?:no   It is medically necessary to have VAD management kits in order to prevent infection or to assist in the healing of an infected  DLES.    Patient MyChart Questionnaire:        No data to display                 Assessment/ Quality of Life Survery:   Complaints Of Nausea / Vomiting: None noted    Appearance and Frequency Of Stools: normal and formed without blood & daily  Color Of Urine: clear/yellow  Pain: YES Location of Pain: foot throbbing sitting and standing  Coping/Depression/Anxiety: coping okay  Sleep Habits: 5-6 hrs /night  Sleep Aids: None noted  Showering: Yes, reminded to change dressing immediately after drying off  Self- Care I have some problems washing or dressing myself  Mobility I have some problems in walking about  Usual Activities I have some problems with performing my usual activities  Activity/Exercise: outpatient therapy   Driving: Yes. Reminded to pull over should there be an alarm before looking down at controller.  Additional Comments: N/A    Labs:    Chemistry        Component Value Date/Time     11/21/2024 0930    K 3.7 11/21/2024 0930     11/21/2024 0930    CO2 25 11/21/2024 0930    BUN 20 11/21/2024 0930    CREATININE 3.7 (H) 11/21/2024 0930     (H) 11/21/2024 0930        Component Value Date/Time    CALCIUM 8.6 (L) 11/21/2024 0930    ALKPHOS 102 11/21/2024 0930    AST 35 11/21/2024 0930    ALT 32 11/21/2024 0930    BILITOT 0.3 11/21/2024 0930    ESTGFRAFRICA >60.0 02/07/2018 0935    EGFRNONAA >60.0 02/07/2018 0935            Magnesium   Date Value Ref Range Status   11/21/2024 1.8 1.6 - 2.6 mg/dL Final       Lab Results   Component Value Date    WBC 9.07 11/21/2024    HGB 11.2 (L) 11/21/2024    HCT 37.4 (L) 11/21/2024    MCV 85 11/21/2024     11/21/2024       Lab Results   Component Value Date    INR 2.4 (H) 11/21/2024    INR 2.2 (H) 11/19/2024    INR 2.5 (H) 11/12/2024       BNP   Date Value Ref Range Status   11/21/2024 521 (H) 0 - 99 pg/mL Final     Comment:     Values of less than 100 pg/ml are consistent with non-CHF populations.   10/24/2024 846 (H) 0 - 99 pg/mL Final      Comment:     Values of less than 100 pg/ml are consistent with non-CHF populations.   10/03/2024 730 (H) 0 - 99 pg/mL Final     Comment:     Values of less than 100 pg/ml are consistent with non-CHF populations.       LD   Date Value Ref Range Status   11/21/2024 339 (H) 110 - 260 U/L Final     Comment:     Results are increased in hemolyzed samples.   10/24/2024 324 (H) 110 - 260 U/L Final     Comment:     Results are increased in hemolyzed samples.   09/26/2024 260 110 - 260 U/L Final     Comment:     Results are increased in hemolyzed samples.       Labs reviewed with patient: YES     Medication Reconciliation: per MA.  New Medication Detail Provided: yes  Coumadin Managed by: [facility]    Education: Reviewed driveline care, emergency procedures, how to change the controller, alarms with patient, as well as discussed how to page the VAD coordinator in case of an emergency.   Educated patient/family that chest compressions are allowed in the event they are needed.    Reminded patient/caregiver not to touch their face and to cover their mouth when they cough or sneeze.   Vaccines: Pt informed that we are encouraging all VAD patients to receive  vaccines and boosters. Informed pt that they can take tylenol but should avoid other NSAIDs.       Plans/Needs: Patient seen in clinic for routine follow up with . Patient is feeling ok but having foot throbbing even when sitting, worse when ambulating. Referral placed for neurology. Patient also complained of GERD, advised to take Protonix slightly before other medications. Completed a lipid panel, reviewed by MD. Patient to RTC in 1 month with an echo.    Hurricane Season: No

## 2024-11-21 NOTE — PATIENT INSTRUCTIONS
You have just the right amount of fluid on you.  Please adhere to a low sodium diet (no more than 1.5 grams of sodium in 24h).  3.   Follow fluid restriction of  1. no more than 2 liters in 24 hours..   4. No changes on medications.  You have been referred to Neurology.

## 2024-11-21 NOTE — PROCEDURES
11/21/2024    10:30 AM 10/24/2024    10:09 AM 10/16/2024     7:43 PM 9/26/2024    10:21 AM 8/29/2024    11:07 AM 8/7/2024     4:00 PM 8/7/2024    12:13 PM   TXP PINA INTERROGATIONS   Type HeartMate3 HeartMate3  HeartMate3 HeartMate3     Flow 3.5 3.7  3.8 4     Speed 5000 5050  5000 5000     PI 8.6 5.2  6.8 4     Power ([place]) 3.5 2.5  3.5 3.4     LSL 4600 4600  4600 4600     Pulsatility Pulse No Pulse Intermittent pulse No Pulse No Pulse Intermittent pulse Intermittent pulse   }

## 2024-11-21 NOTE — PROGRESS NOTES
Equipment:  Any Equipment Needs: Routine Maintenance    Emergency Bag  Emergency Equipment With Patient: Yes   Condition of emergency bag: NO visible damage  Contents of emergency bag: Backup controller, 2 fully charged batteries, 2 battery clips, reference cards    Maintenance Tracking  Patient has monthly checklist today: No  Patient correctly utilizing monthly checklist:  N/A  Educated patient on utilizing monthly checklist correctly and importance of this: Yes    Equipment Inspection  Inspected patient's equipment today: Yes  Equipment clean and free of debris, including locking mechanism: Yes  Cleaned equipment today and educated patient on how to do this: Yes  Manual and visual inspection of driveline: NO   Kinks, rescue tape, tears: No  Clamshell repair: No  Perc lead repair: No    Patient wearing waist strap, vest, vielka vest, concealed carry shirt: Concealed carry shirt  Condition of this: No visible damage      Equipment Needs  Equipment issued to patient today in clinic: No   Discussed with MCS Coordinator and physician: Yes  Items Under Warranty No VAD Coordinator Notified Yes  Equipment loaned to patient today: No   Waveforms sent: No   It is medically necessary to ensure patient has properly functioning equipment and wearables to prevent infection, injury or death to patient.

## 2024-11-21 NOTE — LETTER
November 21, 2024        Kevin Franklin  [address] [place] [address]  Phone: [phone]  Fax: [phone]             [facility]  [address]  [facility] [address]  Phone: [phone]   Patient: Radha Abbott   MR Number: [id number]   YOB: 1960   Date of Visit: 11/21/2024       Dear Dr. Kevin Franklin    Thank you for referring Radha Abbott to me for evaluation. Attached you will find relevant portions of my assessment and plan of care.    If you have questions, please do not hesitate to call me. I look forward to following Radha Abbott along with you.    Sincerely,    Sajan Casas MD    Enclosure    If you would like to receive this communication electronically, please contact [email or web address] or [phone] to request [employer] access.    [employer] is a tool which provides read-only access to select patient information with whom you have a relationship. Its easy to use and provides real time access to review your patients record including encounter summaries, notes, results, and demographic information.    If you feel you have received this communication in error or would no longer like to receive these types of communications, please e-mail [email or web address]

## 2024-11-27 ENCOUNTER — LAB VISIT (OUTPATIENT)
Dept: LAB | Facility: HOSPITAL | Age: 64
End: 2024-11-27
Attending: INTERNAL MEDICINE
Payer: MEDICAID

## 2024-11-27 ENCOUNTER — ANTI-COAG VISIT (OUTPATIENT)
Dept: CARDIOLOGY | Facility: CLINIC | Age: 64
End: 2024-11-27
Payer: MEDICAID

## 2024-11-27 DIAGNOSIS — Z95.811 PRESENCE OF VENTRICULAR ASSIST DEVICE: ICD-10-CM

## 2024-11-27 DIAGNOSIS — Z95.811 LVAD (LEFT VENTRICULAR ASSIST DEVICE) PRESENT: Primary | ICD-10-CM

## 2024-11-27 LAB
INR PPP: 2.2 (ref 0.8–1.2)
PROTHROMBIN TIME: 23.2 SEC (ref 9–12.5)

## 2024-11-27 PROCEDURE — 36415 COLL VENOUS BLD VENIPUNCTURE: CPT | Performed by: INTERNAL MEDICINE

## 2024-11-27 PROCEDURE — 85610 PROTHROMBIN TIME: CPT | Performed by: INTERNAL MEDICINE

## 2024-11-27 PROCEDURE — 93793 ANTICOAG MGMT PT WARFARIN: CPT | Mod: ,,,

## 2024-11-27 NOTE — PROGRESS NOTES
[facility]    2024 12:41 PM    Assessment/Plan:    Patient presents today with therapeutic INR.    Assessment of patient findings and chart review: no significant findings     Recommendation for patient's warfarin regimen: Continue current maintenance dose    Recommend repeat INR in 1 week  _________________________________________________________________    Radha Abbott (64 y.o.) is followed by the [facility].    Anticoagulation Summary  As of 2024      INR goal:  2.0-3.0   TTR:  66.6% (7.1 mo)   INR used for dosin.2 (2024)   Warfarin maintenance plan:  1.25 mg (2.5 mg x 0.5) every Mon, Fri; 2.5 mg (2.5 mg x 1) all other days   Weekly warfarin total:  15 mg   Plan last modified:  Joycelyn Angel, PharmD (2024)   Next INR check:  2024   Target end date:  --    Indications    LVAD (left ventricular assist device) present [Z95.811]                 Anticoagulation Episode Summary       INR check location:  --    Preferred lab:  --    Send INR reminders to:  [facility] COUMADIN LVAD    Comments:  Dc'd from [facility] as of 2024 per wifeArlyn. [facility] (ph [phone], fax [phone])  // [facility]          Anticoagulation Care Providers       Provider Role Specialty Phone number    Sajan Hurley MD [facility] Cardiology [phone]

## 2024-12-04 ENCOUNTER — LAB VISIT (OUTPATIENT)
Dept: LAB | Facility: HOSPITAL | Age: 64
End: 2024-12-04
Attending: INTERNAL MEDICINE
Payer: MEDICAID

## 2024-12-04 ENCOUNTER — TELEPHONE (OUTPATIENT)
Dept: TRANSPLANT | Facility: CLINIC | Age: 64
End: 2024-12-04
Payer: MEDICAID

## 2024-12-04 ENCOUNTER — ANTI-COAG VISIT (OUTPATIENT)
Dept: CARDIOLOGY | Facility: CLINIC | Age: 64
End: 2024-12-04
Payer: MEDICAID

## 2024-12-04 DIAGNOSIS — Z95.811 LVAD (LEFT VENTRICULAR ASSIST DEVICE) PRESENT: Primary | ICD-10-CM

## 2024-12-04 DIAGNOSIS — Z95.811 PRESENCE OF VENTRICULAR ASSIST DEVICE: ICD-10-CM

## 2024-12-04 LAB
INR PPP: 2.1 (ref 0.8–1.2)
PROTHROMBIN TIME: 22.9 SEC (ref 9–12.5)

## 2024-12-04 PROCEDURE — 85610 PROTHROMBIN TIME: CPT | Performed by: INTERNAL MEDICINE

## 2024-12-04 PROCEDURE — 93793 ANTICOAG MGMT PT WARFARIN: CPT | Mod: ,,,

## 2024-12-04 PROCEDURE — 36415 COLL VENOUS BLD VENIPUNCTURE: CPT | Performed by: INTERNAL MEDICINE

## 2024-12-04 NOTE — TELEPHONE ENCOUNTER
Attempted to contact patient to follow up on below message. Left message for call back.        Walter Mcintyre MD sent to Alva Barry RN  Cc: Marilu Ma RN; Ashanti Duncan, KYAW; Sally, Candice, RN  Caller: Unspecified (Today,  6:30 AM)  Not at this time, as he has also gotten hypotensive in the past. Recheck BP later today vs tomorrow          Previous Messages       ----- Message -----  From: Alva Barry RN  Sent: 12/4/2024   8:08 AM CST  To: Marilu Ma RN; Ashanti Duncan RN; *    ----- Message from Alva Barry RN sent at 12/4/2024  8:08 AM CST -----  Good morning Walter    Pt had a LFA x1 this am. Asymptomatic. Curret flows were 3.1.  PI wass 8. His wife said she could feel a pulse and BP was 128/84. She said we've stopped all of his BP meds.  Do you want to restart him on something or just see what his BP is later today?    Alva Youssef

## 2024-12-04 NOTE — TELEPHONE ENCOUNTER
Arlyn paged to report Radha had a LFA this am.  His BP is 128/84, flow 3.1, PI 8.  He isn't on any BP medications. Arlyn said they were all D/C at previous clinic visits. She said she can feel a pulse in his wrist.

## 2024-12-04 NOTE — PROGRESS NOTES
[facility]    2024 11:32 AM    Assessment/Plan:    Patient presents today with therapeutic INR.    Assessment of patient findings and chart review: no significant findings     Recommendation for patient's warfarin regimen: Continue current maintenance dose    Recommend repeat INR in 1 week  _________________________________________________________________    Radha Abbott (64 y.o.) is followed by the [email or web address].    Anticoagulation Summary  As of 2024      INR goal:  2.0-3.0   TTR:  67.7% (7.4 mo)   INR used for dosin.1 (2024)   Warfarin maintenance plan:  1.25 mg (2.5 mg x 0.5) every Mon, Fri; 2.5 mg (2.5 mg x 1) all other days   Weekly warfarin total:  15 mg   Plan last modified:  Joycelyn Angel, PharmD (2024)   Next INR check:  2024   Target end date:  --    Indications    LVAD (left ventricular assist device) present [Z95.811]                 Anticoagulation Episode Summary       INR check location:  --    Preferred lab:  --    Send INR reminders to:  [facility] COUMADIN LVAD    Comments:  Dc'd from [facility] as of 2024 per wifeArlyn. [facility] (ph [phone], fax [phone])  // [facility]          Anticoagulation Care Providers       Provider Role Specialty Phone number    Sajan Hurley MD [facility] Cardiology [phone]

## 2024-12-11 ENCOUNTER — ANTI-COAG VISIT (OUTPATIENT)
Dept: CARDIOLOGY | Facility: CLINIC | Age: 64
End: 2024-12-11
Payer: MEDICAID

## 2024-12-11 ENCOUNTER — LAB VISIT (OUTPATIENT)
Dept: LAB | Facility: HOSPITAL | Age: 64
End: 2024-12-11
Attending: INTERNAL MEDICINE
Payer: MEDICAID

## 2024-12-11 DIAGNOSIS — Z95.811 PRESENCE OF VENTRICULAR ASSIST DEVICE: ICD-10-CM

## 2024-12-11 DIAGNOSIS — Z95.811 LVAD (LEFT VENTRICULAR ASSIST DEVICE) PRESENT: Primary | ICD-10-CM

## 2024-12-11 LAB
INR PPP: 2.6 (ref 0.8–1.2)
PROTHROMBIN TIME: 28 SEC (ref 9–12.5)

## 2024-12-11 PROCEDURE — 36415 COLL VENOUS BLD VENIPUNCTURE: CPT | Performed by: INTERNAL MEDICINE

## 2024-12-11 PROCEDURE — 85610 PROTHROMBIN TIME: CPT | Performed by: INTERNAL MEDICINE

## 2024-12-11 PROCEDURE — 93793 ANTICOAG MGMT PT WARFARIN: CPT | Mod: ,,,

## 2024-12-11 NOTE — PROGRESS NOTES
Wife will advise patient to take coumadin dose as per calendar. Patients wife verbalized understanding.

## 2024-12-11 NOTE — PROGRESS NOTES
[facility]    2024 11:04 AM    Assessment/Plan:    Patient presents today with therapeutic INR.    Assessment of patient findings and chart review: no significant findings     Recommendation for patient's warfarin regimen: Continue current maintenance dose    Recommend repeat INR in 1 week  _________________________________________________________________    Radha Abbott (64 y.o.) is followed by the [facility].    Anticoagulation Summary  As of 2024      INR goal:  2.0-3.0   TTR:  68.7% (7.6 mo)   INR used for dosin.6 (2024)   Warfarin maintenance plan:  1.25 mg (2.5 mg x 0.5) every Mon, Fri; 2.5 mg (2.5 mg x 1) all other days   Weekly warfarin total:  15 mg   Plan last modified:  Joycelyn Angel, PharmD (2024)   Next INR check:  2024   Target end date:  --    Indications    LVAD (left ventricular assist device) present [Z95.811]                 Anticoagulation Episode Summary       INR check location:  --    Preferred lab:  --    Send INR reminders to:  [facility] COUMADIN LVAD    Comments:  Dc'd from [facility] as of 2024 per wifeArlyn. [facility] (ph [phone], fax [phone])  // [facility]          Anticoagulation Care Providers       Provider Role Specialty Phone number    Sajan Hurley MD [facility] Cardiology [phone]

## 2024-12-12 ENCOUNTER — TELEPHONE (OUTPATIENT)
Dept: TRANSPLANT | Facility: CLINIC | Age: 64
End: 2024-12-12
Payer: MEDICAID

## 2024-12-12 NOTE — TELEPHONE ENCOUNTER
----- Message from Med Assistant [place] sent at 12/12/2024  1:38 PM CST -----  Regarding: FW: Refill    ----- Message -----  From: Nona Han  Sent: 12/12/2024  11:21 AM CST  To: [facility] Medical Assistants  Subject: Refill                                           [facility] calling to get a refill on amiodarone (PACERONE) 400 MG tablet

## 2024-12-17 ENCOUNTER — TELEPHONE (OUTPATIENT)
Dept: TRANSPLANT | Facility: CLINIC | Age: 64
End: 2024-12-17
Payer: MEDICAID

## 2024-12-17 NOTE — TELEPHONE ENCOUNTER
VAD Contact: Called Spouse regarding equipment maintenance due at upcoming clinic appointment on 12/19.  Requested Spouse to bring Battery Charger (UBC) and Emergency Bag (2 batteries, 2 clips, 1 backup controller) with them to next appointment for maintenance. It is medically necessary to ensure patient has properly functioning equipment and wearables to prevent infection, injury or death to patient.

## 2024-12-19 ENCOUNTER — CLINICAL SUPPORT (OUTPATIENT)
Dept: TRANSPLANT | Facility: CLINIC | Age: 64
End: 2024-12-19
Payer: MEDICAID

## 2024-12-19 ENCOUNTER — ANTI-COAG VISIT (OUTPATIENT)
Dept: CARDIOLOGY | Facility: CLINIC | Age: 64
End: 2024-12-19
Payer: MEDICAID

## 2024-12-19 ENCOUNTER — HOSPITAL ENCOUNTER (OUTPATIENT)
Dept: CARDIOLOGY | Facility: HOSPITAL | Age: 64
Discharge: HOME | End: 2024-12-19
Attending: INTERNAL MEDICINE
Payer: MEDICAID

## 2024-12-19 ENCOUNTER — OFFICE VISIT (OUTPATIENT)
Dept: TRANSPLANT | Facility: CLINIC | Age: 64
End: 2024-12-19
Payer: MEDICAID

## 2024-12-19 VITALS
BODY MASS INDEX: 27.43 KG/M2 | HEART RATE: 82 BPM | BODY MASS INDEX: 25.47 KG/M2 | SYSTOLIC BLOOD PRESSURE: 84 MMHG | HEART RATE: 80 BPM | SYSTOLIC BLOOD PRESSURE: 84 MMHG | TEMPERATURE: 98 F | WEIGHT: 181 LBS | HEIGHT: 69 IN | HEIGHT: 68 IN | WEIGHT: 171.94 LBS

## 2024-12-19 DIAGNOSIS — Z99.2 CKD (CHRONIC KIDNEY DISEASE) STAGE V REQUIRING CHRONIC DIALYSIS: ICD-10-CM

## 2024-12-19 DIAGNOSIS — I48.0 PAF (PAROXYSMAL ATRIAL FIBRILLATION): ICD-10-CM

## 2024-12-19 DIAGNOSIS — N18.6 CKD (CHRONIC KIDNEY DISEASE) STAGE V REQUIRING CHRONIC DIALYSIS: ICD-10-CM

## 2024-12-19 DIAGNOSIS — Z95.811 PRESENCE OF VENTRICULAR ASSIST DEVICE: ICD-10-CM

## 2024-12-19 DIAGNOSIS — E11.9 TYPE 2 DIABETES MELLITUS WITHOUT COMPLICATION, WITHOUT LONG-TERM CURRENT USE OF INSULIN: ICD-10-CM

## 2024-12-19 DIAGNOSIS — I50.42 CHRONIC COMBINED SYSTOLIC (CONGESTIVE) AND DIASTOLIC (CONGESTIVE) HEART FAILURE: ICD-10-CM

## 2024-12-19 DIAGNOSIS — I50.84 END STAGE HEART FAILURE: ICD-10-CM

## 2024-12-19 DIAGNOSIS — Z95.811 LVAD (LEFT VENTRICULAR ASSIST DEVICE) PRESENT: Primary | ICD-10-CM

## 2024-12-19 DIAGNOSIS — I25.10 CORONARY ARTERY DISEASE INVOLVING NATIVE CORONARY ARTERY OF NATIVE HEART WITHOUT ANGINA PECTORIS: ICD-10-CM

## 2024-12-19 DIAGNOSIS — Z95.811 PRESENCE OF VENTRICULAR ASSIST DEVICE: Primary | ICD-10-CM

## 2024-12-19 DIAGNOSIS — Z95.811 HEART REPLACED: Primary | ICD-10-CM

## 2024-12-19 LAB
ASCENDING AORTA: 3.26 CM
BSA FOR ECHO PROCEDURE: 1.95 M2
CV ECHO LV RWT: 0.3 CM
DOP CALC LVOT AREA: 3.1 CM2
DOP CALC LVOT DIAMETER: 2 CM
E WAVE DECELERATION TIME: 207.92 MS
E/A RATIO: 0.91
E/E' RATIO: 11.27 M/S
ECHO EF ESTIMATED: 21 %
ECHO LV POSTERIOR WALL: 0.8 CM (ref 0.6–1.1)
FRACTIONAL SHORTENING: 11.1 % (ref 28–44)
INTERVENTRICULAR SEPTUM: 0.6 CM (ref 0.6–1.1)
IVC DIAMETER: 1.23 CM
LA MAJOR: 4.85 CM
LA MINOR: 5.47 CM
LA WIDTH: 4.92 CM
LEFT ATRIUM SIZE: 4.65 CM
LEFT ATRIUM VOLUME INDEX MOD: 38.7 ML/M2
LEFT ATRIUM VOLUME INDEX: 51.5 ML/M2
LEFT ATRIUM VOLUME MOD: 75.08 ML
LEFT ATRIUM VOLUME: 99.98 CM3
LEFT INTERNAL DIMENSION IN SYSTOLE: 4.8 CM (ref 2.1–4)
LEFT VENTRICLE DIASTOLIC VOLUME INDEX: 71.26 ML/M2
LEFT VENTRICLE DIASTOLIC VOLUME: 138.24 ML
LEFT VENTRICLE MASS INDEX: 67.6 G/M2
LEFT VENTRICLE SYSTOLIC VOLUME INDEX: 56.2 ML/M2
LEFT VENTRICLE SYSTOLIC VOLUME: 109.02 ML
LEFT VENTRICULAR INTERNAL DIMENSION IN DIASTOLE: 5.4 CM (ref 3.5–6)
LEFT VENTRICULAR MASS: 131.2 G
LV LATERAL E/E' RATIO: 8.86
LV SEPTAL E/E' RATIO: 15.5
LVAD BASE RATE: 5000 RPM
MV PEAK A VEL: 0.68 M/S
MV PEAK E VEL: 0.62 M/S
OHS CV RV/LV RATIO: 0.69 CM
PISA TR MAX VEL: 2.52 M/S
RA MAJOR: 4.93 CM
RA PRESSURE ESTIMATED: 8 MMHG
RA WIDTH: 4.22 CM
RIGHT ATRIAL AREA: 16.9 CM2
RIGHT VENTRICLE DIASTOLIC BASEL DIMENSION: 3.7 CM
RV TB RVSP: 11 MMHG
RV TISSUE DOPPLER FREE WALL SYSTOLIC VELOCITY 1 (APICAL 4 CHAMBER VIEW): 6.27 CM/S
SINUS: 2.77 CM
STJ: 2.43 CM
TDI LATERAL: 0.07 M/S
TDI SEPTAL: 0.04 M/S
TDI: 0.06 M/S
TR MAX PG: 25 MMHG
TRICUSPID ANNULAR PLANE SYSTOLIC EXCURSION: 1.3 CM
TV PEAK GRADIENT: 25 MMHG
TV REST PULMONARY ARTERY PRESSURE: 33 MMHG
Z-SCORE OF LEFT VENTRICULAR DIMENSION IN END DIASTOLE: -0.2
Z-SCORE OF LEFT VENTRICULAR DIMENSION IN END SYSTOLE: 2.75

## 2024-12-19 PROCEDURE — 99999PBSHW PR PBB SHADOW TECHNICAL ONLY FILED TO HB: Mod: PBBFAC,,,

## 2024-12-19 PROCEDURE — 93306 TTE W/DOPPLER COMPLETE: CPT

## 2024-12-19 PROCEDURE — 1159F MED LIST DOCD IN RCRD: CPT | Mod: CPTII,,, | Performed by: INTERNAL MEDICINE

## 2024-12-19 PROCEDURE — 3066F NEPHROPATHY DOC TX: CPT | Mod: CPTII,,, | Performed by: INTERNAL MEDICINE

## 2024-12-19 PROCEDURE — 99999 PR PBB SHADOW E&M-EST. PATIENT-LVL IV: CPT | Mod: PBBFAC,,, | Performed by: INTERNAL MEDICINE

## 2024-12-19 PROCEDURE — 3008F BODY MASS INDEX DOCD: CPT | Mod: CPTII,,, | Performed by: INTERNAL MEDICINE

## 2024-12-19 PROCEDURE — 3044F HG A1C LEVEL LT 7.0%: CPT | Mod: CPTII,,, | Performed by: INTERNAL MEDICINE

## 2024-12-19 PROCEDURE — 99214 OFFICE O/P EST MOD 30 MIN: CPT | Mod: PBBFAC,25 | Performed by: INTERNAL MEDICINE

## 2024-12-19 PROCEDURE — 93306 TTE W/DOPPLER COMPLETE: CPT | Mod: 26,,, | Performed by: INTERNAL MEDICINE

## 2024-12-19 PROCEDURE — 93750 INTERROGATION VAD IN PERSON: CPT | Mod: S$PBB,,, | Performed by: INTERNAL MEDICINE

## 2024-12-19 PROCEDURE — 99215 OFFICE O/P EST HI 40 MIN: CPT | Mod: S$PBB,,, | Performed by: INTERNAL MEDICINE

## 2024-12-19 RX ORDER — AMIODARONE HYDROCHLORIDE 400 MG/1
400 TABLET ORAL DAILY
Qty: 90 TABLET | Refills: 3 | Status: SHIPPED | OUTPATIENT
Start: 2024-12-19 | End: 2025-12-19

## 2024-12-19 RX ORDER — ATORVASTATIN CALCIUM 40 MG/1
40 TABLET, FILM COATED ORAL NIGHTLY
Qty: 90 TABLET | Refills: 3 | Status: SHIPPED | OUTPATIENT
Start: 2024-12-19

## 2024-12-19 NOTE — PROGRESS NOTES
Equipment:  Any Equipment Needs: Routine Maintenance    Emergency Bag  Emergency Equipment With Patient: Yes   Condition of emergency bag: NO visible damage  Contents of emergency bag: Backup controller, 2 fully charged batteries, 2 battery clips, reference cards    Maintenance Tracking  Patient has monthly checklist today: No  Patient correctly utilizing monthly checklist:  N/A  Educated patient on utilizing monthly checklist correctly and importance of this: Yes    Equipment Inspection  Inspected patient's equipment today: Yes  Equipment clean and free of debris, including locking mechanism: Yes  Cleaned equipment today and educated patient on how to do this: Yes  Manual and visual inspection of driveline: NO   Kinks, rescue tape, tears:  NO  Clamshell repair: No  Perc lead repair: No    Patient wearing waist strap, vest, vielka vest, concealed carry shirt: Concealed carry shirt  Condition of this: NO visible damage      Universal Battery Charger  [facility] serial number: [id number]  [facility] maintenance due:12/2025  [facility] maintenance completed today Yes  [facility] yearly maintenance complete. UBC and power cable inspected for damage and noted to be in good condition. Power on test and  slot test completed and passed. Equipment cleaned.      Equipment Needs  Equipment issued to patient today in clinic: No   Discussed with MCS Coordinator and physician: Yes  Items Under Warranty No VAD Coordinator Notified Yes  Equipment loaned to patient today: No   Waveforms sent: No   It is medically necessary to ensure patient has properly functioning equipment and wearables to prevent infection, injury or death to patient.

## 2024-12-19 NOTE — PROGRESS NOTES
Arlyn stated they will be traveling back to [place] on 12/26/24 and requested to have INR drawn on 12/27/24, INR scheduled.

## 2024-12-19 NOTE — PROGRESS NOTES
[facility]    2024 10:35 AM    Assessment/Plan:    Patient presents today with therapeutic INR.    Assessment of patient findings and chart review: no significant findings     Recommendation for patient's warfarin regimen: Continue current maintenance dose    Recommend repeat INR in 1 week  _________________________________________________________________    Radha Abbott (64 y.o.) is followed by the [facility].    Anticoagulation Summary  As of 2024      INR goal:  2.0-3.0   TTR:  69.7% (7.9 mo)   INR used for dosin.8 (2024)   Warfarin maintenance plan:  1.25 mg (2.5 mg x 0.5) every Mon, Fri; 2.5 mg (2.5 mg x 1) all other days   Weekly warfarin total:  15 mg   Plan last modified:  Joycelyn Angel, PharmD (2024)   Next INR check:  2024   Target end date:  --    Indications    LVAD (left ventricular assist device) present [Z95.811]                 Anticoagulation Episode Summary       INR check location:  --    Preferred lab:  --    Send INR reminders to:  [facility] COUMADIN LVAD    Comments:  Dc'd from [facility] as of 2024 per wifeArlyn. [facility] (ph [phone], fax [phone])  // [facility]          Anticoagulation Care Providers       Provider Role Specialty Phone number    Sajan Hurley MD [facility] [phone]

## 2024-12-19 NOTE — PROCEDURES
12/19/2024    10:41 AM 11/21/2024    10:30 AM 10/24/2024    10:09 AM 10/16/2024     7:43 PM 9/26/2024    10:21 AM 8/29/2024    11:07 AM 8/7/2024     4:00 PM   TXP PINA INTERROGATIONS   Type HeartMate3 HeartMate3 HeartMate3  HeartMate3 HeartMate3    Flow 3.2 3.5 3.7  3.8 4    Speed 5000 5000 5050  5000 5000    PI 8.1 8.6 5.2  6.8 4    Power ([place]) 3.5 3.5 2.5  3.5 3.4    LSL 4600 4600 4600  4600 4600    Pulsatility No Pulse Pulse No Pulse Intermittent pulse No Pulse No Pulse Intermittent pulse   }

## 2024-12-19 NOTE — PROGRESS NOTES
"Subjective:   Patient ID:  Radha Abbott is a 64 y.o. male who presents for LVAD followup visit.    Implant Date:12/1/23     Heartmate 3 RPM 5000     INR goal: 2-3   Bridge with Heparin   Antiplatelets: None d/t low H/H on implant          12/19/2024    10:41 AM 11/21/2024    10:30 AM 10/24/2024    10:09 AM 10/16/2024     7:43 PM 9/26/2024    10:21 AM 8/29/2024    11:07 AM 8/7/2024     4:00 PM   TXP PINA INTERROGATIONS   Type HeartMate3 HeartMate3 HeartMate3  HeartMate3 HeartMate3    Flow 3.2 3.5 3.7  3.8 4    Speed 5000 5000 5050  5000 5000    PI 8.1 8.6 5.2  6.8 4    Power ([place]) 3.5 3.5 2.5  3.5 3.4    LSL 4600 4600 4600  4600 4600    Pulsatility No Pulse Pulse No Pulse Intermittent pulse No Pulse No Pulse Intermittent pulse       HPI  Mr. Radha Abbott is a 63 yo male with stage D HFrEF, ICMP who underwent HM3 placement placed by Dr Washington on 12/1/23. Lengthy post op course complicated by vasoplegia(requiring Giaprezza), debility, malnutrition/impaired swallowing, and renal failure requiring HD (since weaned off). Once medically stable, he was transferred to Rehab for further PT/OT/ST. He had done well for a hile but then had several clinical events including; worsening renal failure and  COVID/pneumonia. Was discharged and readmitted again for C diff for which he was successfully treated. He is on HD now on MWF (3 hours sessions). Also doing outpatient therapy twice a week (Tuesday and Thursdays from 7:30 am-8:30 am). He comes today for a follow-up visit. Doing better.  Has no cardiac complaints.  VAD speed is at 5000 rpm. Had 23 speed drops yesterday morning which coincided with his HD session. Also had a fall last Monday (legs gave out) and hit his head (right eye bruises). BP is 84 mm of Hg. DLES is a "1". Most recent INR was therapeutic at 2.8. LDH is at baseline.     Review of Systems   Constitutional: Negative. Negative for chills, decreased appetite, diaphoresis, fever, malaise/fatigue, night sweats, weight " "gain and weight loss.   Eyes: Negative.    Cardiovascular:  Negative for chest pain, claudication, cyanosis, dyspnea on exertion, irregular heartbeat, leg swelling, near-syncope, orthopnea, palpitations, paroxysmal nocturnal dyspnea and syncope.   Respiratory:  Negative for cough, hemoptysis and shortness of breath.    Endocrine: Negative.    Hematologic/Lymphatic: Negative.    Skin:  Negative for color change, dry skin and nail changes.   Musculoskeletal: Negative.    Gastrointestinal: Negative.    Genitourinary: Negative.    Neurological:  Negative for weakness.       Objective:   Blood pressure (!) 84/0, pulse 82, temperature 98.4 °F (36.9 °C), temperature source Oral, height 5' 8" (1.727 m), weight 82.1 kg (181 lb).body mass index is unknown because there is no height or weight on file.    Doppler: 84    Physical Exam  Vitals reviewed.   Constitutional:       Appearance: He is well-developed.      Comments: There were no vitals taken for this visit.     HENT:      Head: Normocephalic.   Neck:      Vascular: No carotid bruit or JVD.   Cardiovascular:      Heart sounds: No murmur heard.     Comments: Smooth VAD hum. DLES is "1"  Pulmonary:      Effort: Pulmonary effort is normal.      Breath sounds: Normal breath sounds.   Abdominal:      General: Bowel sounds are normal.      Palpations: Abdomen is soft.   Skin:     General: Skin is warm.   Neurological:      Mental Status: He is alert.         Lab Results   Component Value Date    WBC 7.23 12/19/2024    HGB 10.1 (L) 12/19/2024    HCT 34.4 (L) 12/19/2024    MCV 87 12/19/2024     12/19/2024    CO2 25 11/21/2024    CREATININE 3.7 (H) 11/21/2024    CALCIUM 8.6 (L) 11/21/2024    ALBUMIN 2.6 (L) 11/21/2024    AST 35 11/21/2024     (H) 12/19/2024    ALT 32 11/21/2024     (H) 12/19/2024       Lab Results   Component Value Date    INR 2.8 (H) 12/19/2024    INR 2.6 (H) 12/11/2024    INR 2.1 (H) 12/04/2024       BNP   Date Value Ref Range Status "   12/19/2024 630 (H) 0 - 99 pg/mL Final     Comment:     Values of less than 100 pg/ml are consistent with non-CHF populations.   11/21/2024 521 (H) 0 - 99 pg/mL Final     Comment:     Values of less than 100 pg/ml are consistent with non-CHF populations.   10/24/2024 846 (H) 0 - 99 pg/mL Final     Comment:     Values of less than 100 pg/ml are consistent with non-CHF populations.       LD   Date Value Ref Range Status   12/19/2024 383 (H) 110 - 260 U/L Final     Comment:     Results are increased in hemolyzed samples.   11/21/2024 339 (H) 110 - 260 U/L Final     Comment:     Results are increased in hemolyzed samples.   10/24/2024 324 (H) 110 - 260 U/L Final     Comment:     Results are increased in hemolyzed samples.       Assessment:      1. Presence of ventricular assist device    2. Chronic combined systolic (congestive) and diastolic (congestive) heart failure    3. Coronary artery disease involving native coronary artery of native heart without angina pectoris    4. End stage heart failure    5. Type 2 diabetes mellitus without complication, without long-term current use of insulin    6. PAF (paroxysmal atrial fibrillation)    7. CKD (chronic kidney disease) stage V requiring chronic dialysis        Plan:   Patient is now NYHA class II but will benefit from outpatient rehab   Owing to his advanced kidney disease and being on dialysis with low BP, he is not on a  ACEI/ARB/ARNI or MRA. He is also not on a beta blocker due to RV dysfunction.   BP is controlled.  INR is therapeutic.   VAD interrogation was performed in clinic  Any VAD management kits dispensed today medically necessary  Recommend 2 gram sodium restriction and 1500cc fluid restriction.  Encourage physical activity with graded exercise program.  Requested patient to weigh themselves daily, and to notify us if their weight increases by more than 3 lbs in 1 day or 5 lbs in 1 week.   Additionally, the patient has a patient centered goal of being able  to get stronger (referral to rehab was done today)  RTC in 1 month      Listed for transplant: No. Patient inquired about candidacy for [facility]. In view of hsi clinical comorbidities and the need of Dual organ Herat and Kidney, he would likely be declined in our center. My recommendation is for him to get stronger (very debilitated at this time) and get a second opinion.      UNOS Patient Status  Functional Status: 40% - Disabled: requires special care and assistance  Physical Capacity: No Limitations  Working for Income: No  If no, reason not working: Demands of Treatment        Advance Care Planning  Patient has ACP docs in file.      Brayan Ocampo MD

## 2024-12-19 NOTE — LETTER
December 19, 2024        Kevin Franklin  [address] [place] [address]  Phone: [phone]  Fax: [phone]             [facility]  [address]  [facility] [address]  Phone: [phone]   Patient: Radha Abbott   MR Number: [id number]   YOB: 1960   Date of Visit: 12/19/2024       Dear Dr. Kevin Franklin    Thank you for referring Radha Abbott to me for evaluation. Attached you will find relevant portions of my assessment and plan of care.    If you have questions, please do not hesitate to call me. I look forward to following Radha Abbott along with you.    Sincerely,    Brayan Ocampo MD    Enclosure    If you would like to receive this communication electronically, please contact [email or web address] or [phone] to request [employer] access.    [employer] is a tool which provides read-only access to select patient information with whom you have a relationship. Its easy to use and provides real time access to review your patients record including encounter summaries, notes, results, and demographic information.    If you feel you have received this communication in error or would no longer like to receive these types of communications, please e-mail [email or web address]

## 2024-12-19 NOTE — PROGRESS NOTES
"Date of Implant with Heartmate 3 LVAD: 12/1/23    PATIENT ARRIVED IN CLINIC:  [facility]  Accompanied by: wife  Complaints/reason for visit today: routine    Vitals  Temperature, oral:   Temp Readings from Last 1 Encounters:   12/19/24 98.4 °F (36.9 °C) (Oral)     Blood Pressure:   BP Readings from Last 3 Encounters:   12/19/24 (!) 84/0   12/19/24 (!) 84/0   11/21/24 (!) 84/0        VAD Interrogation:      12/19/2024    10:41 AM 11/21/2024    10:30 AM 10/24/2024    10:09 AM   TXP PINA INTERROGATIONS   Type HeartMate3 HeartMate3 HeartMate3   Flow 3.2 3.5 3.7   Speed 5000 5000 5050   PI 8.1 8.6 5.2   Power ([place]) 3.5 3.5 2.5   LSL 4600 4600 4600   Pulsatility No Pulse Pulse No Pulse     HCT:   Lab Results   Component Value Date    HCT 34.4 (L) 12/19/2024    HCT 39 07/23/2024       VAD Assessment  Problems / Issues /Equipment Needs/ Alarms with VAD if any:  23 speeds to 4600/4700 12/18/24 [id number]  VAD Sounds: HM3   VAD Binder With Patient: No  Reviewed VAD Numbers In Binder: No  Emergency Equipment With Patient: Yes   Equipment Needs: No   It is medically necessary to ensure patient has properly functioning equipment and wearables to prevent infection, injury or death to patient.     DLES Assessment and Dressing Care:  Appearance of DLES: "1"  Antibiotics: NO  Velour: No  Manual & Visual Inspection Of Driveline: No kinks or tears noted  Stabilization Device In Use: yes, goodman securement device    Heartmate 3 Module Cable:  No yellow exposed and Attempted to unscrew modular cable to ensure it will be able to come lose in the event we ever need to change the modular cable while patient held the driveline in place so it would not move. Modular cable connection able to be unscrewed and re-tightened. Instructed pt to perform this weekly.  Frequency of Dressing Changes: weekly & silverlon kit   Pt In Need Of Management Kits?:yes -   1 Box of silverlon kit  It is medically necessary to have VAD management kits in order to " prevent infection or to assist in the healing of an infected DLES.    Assessment/ Quality of Life Survery:   Complaints Of Nausea / Vomiting: None noted    Appearance and Frequency Of Stools: normal and formed without blood & daily  Color Of Urine: clear/yellow  Pain: NO  Coping/Depression/Anxiety: coping okay and depressed  Sleep Habits: 10 hrs /night  Sleep Aids:  Trazadone PRN  Showering: Yes, reminded to change dressing immediately after drying off  Self- Care I have some problems washing or dressing myself  Mobility I have some problems in walking about  Usual Activities I have some problems with performing my usual activities  Activity/Exercise: Physical therapy every T/Th   Driving: Yes. Reminded to pull over should there be an alarm before looking down at controller.      Labs:    Chemistry        Component Value Date/Time     (L) 12/19/2024 0934    K 5.0 12/19/2024 0934     12/19/2024 0934    CO2 22 (L) 12/19/2024 0934    BUN 34 (H) 12/19/2024 0934    CREATININE 5.3 (H) 12/19/2024 0934     (H) 12/19/2024 0934        Component Value Date/Time    CALCIUM 8.7 12/19/2024 0934    ALKPHOS 126 12/19/2024 0934    AST 69 (H) 12/19/2024 0934    ALT 90 (H) 12/19/2024 0934    BILITOT 0.4 12/19/2024 0934    ESTGFRAFRICA >60.0 02/07/2018 0935    EGFRNONAA >60.0 02/07/2018 0935            Magnesium   Date Value Ref Range Status   12/19/2024 1.9 1.6 - 2.6 mg/dL Final       Lab Results   Component Value Date    WBC 7.23 12/19/2024    HGB 10.1 (L) 12/19/2024    HCT 34.4 (L) 12/19/2024    MCV 87 12/19/2024     12/19/2024       Lab Results   Component Value Date    INR 2.8 (H) 12/19/2024    INR 2.6 (H) 12/11/2024    INR 2.1 (H) 12/04/2024       BNP   Date Value Ref Range Status   12/19/2024 630 (H) 0 - 99 pg/mL Final     Comment:     Values of less than 100 pg/ml are consistent with non-CHF populations.   11/21/2024 521 (H) 0 - 99 pg/mL Final     Comment:     Values of less than 100 pg/ml are  consistent with non-CHF populations.   10/24/2024 846 (H) 0 - 99 pg/mL Final     Comment:     Values of less than 100 pg/ml are consistent with non-CHF populations.       LD   Date Value Ref Range Status   12/19/2024 383 (H) 110 - 260 U/L Final     Comment:     Results are increased in hemolyzed samples.   11/21/2024 339 (H) 110 - 260 U/L Final     Comment:     Results are increased in hemolyzed samples.   10/24/2024 324 (H) 110 - 260 U/L Final     Comment:     Results are increased in hemolyzed samples.       Labs reviewed with patient: YES     Medication Reconciliation: per MA.  New Medication Detail Provided: yes  Coumadin Managed by: [facility]    Education: Reviewed driveline care, emergency procedures, how to change the controller, alarms with patient, as well as discussed how to page the VAD coordinator in case of an emergency.   Educated patient/family that chest compressions are allowed in the event they are needed.    Reminded patient/caregiver not to touch their face and to cover their mouth when they cough or sneeze.   Vaccines: Pt informed that we are encouraging all VAD patients to receive  vaccines and boosters. Informed pt that they can take tylenol but should avoid other NSAIDs.       Plans/Needs: Pt and wife driving to [facility] [place], returning Thursday.   Pt fell Monday. Walking outside without his walker. Hit head. Called 911 and refused transport to hospital. Educated him and his wife to page VC on call if this ever happens again. Educated him and wife on S&s of stroke and to call 911 if any occur.   Pt continues HD M-W-F and physical therapy T-Th  Echo completed today and reviewed by Dr. Ocampo. No changes made today.   Permission given to pt to have a drink (or 2) for [place].     Hurricane Season: No

## 2024-12-27 ENCOUNTER — LAB VISIT (OUTPATIENT)
Dept: LAB | Facility: HOSPITAL | Age: 64
End: 2024-12-27
Attending: INTERNAL MEDICINE
Payer: MEDICAID

## 2024-12-27 ENCOUNTER — ANTI-COAG VISIT (OUTPATIENT)
Dept: CARDIOLOGY | Facility: CLINIC | Age: 64
End: 2024-12-27
Payer: MEDICAID

## 2024-12-27 DIAGNOSIS — Z95.811 LVAD (LEFT VENTRICULAR ASSIST DEVICE) PRESENT: Primary | ICD-10-CM

## 2024-12-27 DIAGNOSIS — Z95.811 PRESENCE OF VENTRICULAR ASSIST DEVICE: ICD-10-CM

## 2024-12-27 LAB
INR PPP: 3.5 (ref 0.8–1.2)
PROTHROMBIN TIME: 36.2 SEC (ref 9–12.5)

## 2024-12-27 PROCEDURE — 36415 COLL VENOUS BLD VENIPUNCTURE: CPT | Performed by: INTERNAL MEDICINE

## 2024-12-27 PROCEDURE — 85610 PROTHROMBIN TIME: CPT | Performed by: INTERNAL MEDICINE

## 2024-12-27 NOTE — PROGRESS NOTES
Arlyn reports correct Warfarin dose, had [place] and [place] 12/25/24, no other changes reported.

## 2024-12-27 NOTE — PROGRESS NOTES
[facility]    12/27/2024 11:52 AM    Assessment/Plan:    Patient presents today with supratherapeutic INR.    Assessment of patient findings and chart review: reports alcohol intake on [place] day    Recommendation for patient's warfarin regimen: Hold dose today then resume current maintenance dose    Recommend repeat INR in 1 week  _________________________________________________________________    Radha Abbott (64 y.o.) is followed by the [facility].    Anticoagulation Summary  As of 12/27/2024      INR goal:  2.0-3.0   TTR:  68.4% (8.1 mo)   INR used for dosing:  3.5 (12/27/2024)   Warfarin maintenance plan:  1.25 mg (2.5 mg x 0.5) every Mon, Fri; 2.5 mg (2.5 mg x 1) all other days   Weekly warfarin total:  15 mg   Plan last modified:  Joycelyn Angel, PharmD (11/1/2024)   Next INR check:  1/2/2025   Target end date:  --    Indications    LVAD (left ventricular assist device) present [Z95.811]                 Anticoagulation Episode Summary       INR check location:  --    Preferred lab:  --    Send INR reminders to:  [facility] COUMADIN LVAD    Comments:  Dc'd from [facility] as of 11/12/2024 per wife, Arlyn. [facility] (ph [phone], fax [phone])  // [facility]          Anticoagulation Care Providers       Provider Role Specialty Phone number    Sajan Hurley MD [facility] Cardiology [phone]

## 2025-01-01 ENCOUNTER — TELEPHONE (OUTPATIENT)
Dept: TRANSPLANT | Facility: CLINIC | Age: 65
End: 2025-01-01
Payer: MEDICAID

## 2025-01-02 ENCOUNTER — LAB VISIT (OUTPATIENT)
Dept: LAB | Facility: HOSPITAL | Age: 65
End: 2025-01-02
Attending: INTERNAL MEDICINE
Payer: MEDICAID

## 2025-01-02 ENCOUNTER — ANTI-COAG VISIT (OUTPATIENT)
Dept: CARDIOLOGY | Facility: CLINIC | Age: 65
End: 2025-01-02
Payer: MEDICAID

## 2025-01-02 ENCOUNTER — PATIENT MESSAGE (OUTPATIENT)
Dept: TRANSPLANT | Facility: CLINIC | Age: 65
End: 2025-01-02
Payer: MEDICAID

## 2025-01-02 DIAGNOSIS — Z95.811 PRESENCE OF VENTRICULAR ASSIST DEVICE: ICD-10-CM

## 2025-01-02 DIAGNOSIS — Z95.811 LVAD (LEFT VENTRICULAR ASSIST DEVICE) PRESENT: Primary | ICD-10-CM

## 2025-01-02 LAB
INR PPP: 3.1 (ref 0.8–1.2)
PROTHROMBIN TIME: 32.2 SEC (ref 9–12.5)

## 2025-01-02 PROCEDURE — 93793 ANTICOAG MGMT PT WARFARIN: CPT | Mod: ,,,

## 2025-01-02 PROCEDURE — 36415 COLL VENOUS BLD VENIPUNCTURE: CPT | Performed by: INTERNAL MEDICINE

## 2025-01-02 PROCEDURE — 85610 PROTHROMBIN TIME: CPT | Performed by: INTERNAL MEDICINE

## 2025-01-02 NOTE — PROGRESS NOTES
[facility]    01/02/2025 12:03 PM    Assessment/Plan:    Patient presents today with supratherapeutic INR.    Assessment of patient findings and chart review: reports nosebleed which has resolved    Recommendation for patient's warfarin regimen: Lower dose today to 1.25mg then resume current maintenance dose    Recommend repeat INR Monday  _________________________________________________________________    Radha Abbott (64 y.o.) is followed by the [facility].    Anticoagulation Summary  As of 1/2/2025      INR goal:  2.0-3.0   TTR:  66.7% (8.3 mo)   INR used for dosing:  3.1 (1/2/2025)   Warfarin maintenance plan:  1.25 mg (2.5 mg x 0.5) every Mon, Fri; 2.5 mg (2.5 mg x 1) all other days   Weekly warfarin total:  15 mg   Plan last modified:  Joycelyn Angel, PharmD (11/1/2024)   Next INR check:  1/6/2025   Target end date:  --    Indications    LVAD (left ventricular assist device) present [Z95.811]                 Anticoagulation Episode Summary       INR check location:  --    Preferred lab:  --    Send INR reminders to:  [facility] COUMADIN LVAD    Comments:  [facility]          Anticoagulation Care Providers       Provider Role Specialty Phone number    Sajan Hurley MD [facility] Cardiology [phone]

## 2025-01-02 NOTE — PROGRESS NOTES
1/2- Pt's wife questioned and confirmed correct dose. Pt had bleeding from his nose on 12/30 or 12/31, but it stopped the same day. Pt's wife denies all other changes.

## 2025-01-06 ENCOUNTER — LAB VISIT (OUTPATIENT)
Dept: LAB | Facility: HOSPITAL | Age: 65
End: 2025-01-06
Attending: INTERNAL MEDICINE
Payer: MEDICAID

## 2025-01-06 ENCOUNTER — ANTI-COAG VISIT (OUTPATIENT)
Dept: CARDIOLOGY | Facility: CLINIC | Age: 65
End: 2025-01-06
Payer: MEDICAID

## 2025-01-06 DIAGNOSIS — Z95.811 LVAD (LEFT VENTRICULAR ASSIST DEVICE) PRESENT: Primary | ICD-10-CM

## 2025-01-06 DIAGNOSIS — Z95.811 PRESENCE OF VENTRICULAR ASSIST DEVICE: ICD-10-CM

## 2025-01-06 LAB
INR PPP: 3.2 (ref 0.8–1.2)
PROTHROMBIN TIME: 33.5 SEC (ref 9–12.5)

## 2025-01-06 PROCEDURE — 36415 COLL VENOUS BLD VENIPUNCTURE: CPT | Performed by: INTERNAL MEDICINE

## 2025-01-06 PROCEDURE — 85610 PROTHROMBIN TIME: CPT | Performed by: INTERNAL MEDICINE

## 2025-01-06 PROCEDURE — 93793 ANTICOAG MGMT PT WARFARIN: CPT | Mod: ,,,

## 2025-01-06 NOTE — PROGRESS NOTES
[facility]    01/06/2025 1:18 PM    Assessment/Plan:    Patient presents today with supratherapeutic INR.    Assessment of patient findings and chart review: no significant findings     Recommendation for patient's warfarin regimen: Hold dose today then decrease maintenance dose    Recommend repeat INR Thursday  _________________________________________________________________    Radha Abbott (64 y.o.) is followed by the [facility].    Anticoagulation Summary  As of 1/6/2025      INR goal:  2.0-3.0   TTR:  65.7% (8.5 mo)   INR used for dosing:  3.2 (1/6/2025)   Warfarin maintenance plan:  1.25 mg (2.5 mg x 0.5) every Mon, Wed, Fri; 2.5 mg (2.5 mg x 1) all other days   Weekly warfarin total:  13.75 mg   Plan last modified:  Jesenia Jensen, PharmD (1/6/2025)   Next INR check:  1/9/2025   Target end date:  --    Indications    LVAD (left ventricular assist device) present [Z95.811]                 Anticoagulation Episode Summary       INR check location:  --    Preferred lab:  --    Send INR reminders to:  [facility] LVAD    Comments:  [facility]          Anticoagulation Care Providers       Provider Role Specialty Phone number    Sajan Hurley MD [facility] Cardiology [phone]

## 2025-01-06 NOTE — PROGRESS NOTES
Arlyn reports correct Warfarin dose, decreased activity, eats a lot of peanuts daily, no other changes reported.

## 2025-01-08 ENCOUNTER — PATIENT MESSAGE (OUTPATIENT)
Dept: TRANSPLANT | Facility: CLINIC | Age: 65
End: 2025-01-08
Payer: MEDICAID

## 2025-01-09 ENCOUNTER — ANTI-COAG VISIT (OUTPATIENT)
Dept: CARDIOLOGY | Facility: CLINIC | Age: 65
End: 2025-01-09
Payer: MEDICAID

## 2025-01-09 ENCOUNTER — LAB VISIT (OUTPATIENT)
Dept: LAB | Facility: HOSPITAL | Age: 65
End: 2025-01-09
Attending: INTERNAL MEDICINE
Payer: MEDICAID

## 2025-01-09 DIAGNOSIS — Z95.811 PRESENCE OF VENTRICULAR ASSIST DEVICE: ICD-10-CM

## 2025-01-09 DIAGNOSIS — Z95.811 LVAD (LEFT VENTRICULAR ASSIST DEVICE) PRESENT: Primary | ICD-10-CM

## 2025-01-09 LAB
INR PPP: 2.4 (ref 0.8–1.2)
PROTHROMBIN TIME: 24.9 SEC (ref 9–12.5)

## 2025-01-09 PROCEDURE — 36415 COLL VENOUS BLD VENIPUNCTURE: CPT | Performed by: INTERNAL MEDICINE

## 2025-01-09 PROCEDURE — 93793 ANTICOAG MGMT PT WARFARIN: CPT | Mod: ,,,

## 2025-01-09 PROCEDURE — 85610 PROTHROMBIN TIME: CPT | Performed by: INTERNAL MEDICINE

## 2025-01-09 NOTE — PROGRESS NOTES
[facility]    2025 10:55 AM    Assessment/Plan:    Patient presents today with therapeutic INR.    Assessment of patient findings and chart review: no significant findings     Recommendation for patient's warfarin regimen: Continue current maintenance dose    Recommend repeat INR Monday  _________________________________________________________________    Emmanuelleaimee Abbott (64 y.o.) is followed by the [facility].    Anticoagulation Summary  As of 2025      INR goal:  2.0-3.0   TTR:  65.8% (8.6 mo)   INR used for dosin.4 (2025)   Warfarin maintenance plan:  1.25 mg (2.5 mg x 0.5) every Mon, Wed, Fri; 2.5 mg (2.5 mg x 1) all other days   Weekly warfarin total:  13.75 mg   Plan last modified:  Jesenia Jensen, PharmD (2025)   Next INR check:  2025   Target end date:  --    Indications    LVAD (left ventricular assist device) present [Z95.811]                 Anticoagulation Episode Summary       INR check location:  --    Preferred lab:  --    Send INR reminders to:  [facility] COUMADIN LVAD    Comments:  [place]          Anticoagulation Care Providers       Provider Role Specialty Phone number    Sajan Hurley MD [facility] Cardiology [phone]

## 2025-01-10 DIAGNOSIS — Z95.811 LVAD (LEFT VENTRICULAR ASSIST DEVICE) PRESENT: ICD-10-CM

## 2025-01-10 RX ORDER — WARFARIN 2.5 MG/1
TABLET ORAL
Qty: 45 TABLET | Refills: 0 | Status: SHIPPED | OUTPATIENT
Start: 2025-01-10

## 2025-01-12 DIAGNOSIS — Z95.811 LVAD (LEFT VENTRICULAR ASSIST DEVICE) PRESENT: ICD-10-CM

## 2025-01-13 ENCOUNTER — ANTI-COAG VISIT (OUTPATIENT)
Dept: CARDIOLOGY | Facility: CLINIC | Age: 65
End: 2025-01-13
Payer: MEDICAID

## 2025-01-13 ENCOUNTER — LAB VISIT (OUTPATIENT)
Dept: LAB | Facility: HOSPITAL | Age: 65
End: 2025-01-13
Attending: INTERNAL MEDICINE
Payer: MEDICAID

## 2025-01-13 DIAGNOSIS — Z95.811 PRESENCE OF VENTRICULAR ASSIST DEVICE: ICD-10-CM

## 2025-01-13 DIAGNOSIS — Z95.811 LVAD (LEFT VENTRICULAR ASSIST DEVICE) PRESENT: Primary | ICD-10-CM

## 2025-01-13 LAB
INR PPP: 2.4 (ref 0.8–1.2)
PROTHROMBIN TIME: 25.7 SEC (ref 9–12.5)

## 2025-01-13 PROCEDURE — 85610 PROTHROMBIN TIME: CPT | Performed by: INTERNAL MEDICINE

## 2025-01-13 PROCEDURE — 93793 ANTICOAG MGMT PT WARFARIN: CPT | Mod: ,,,

## 2025-01-13 PROCEDURE — 36415 COLL VENOUS BLD VENIPUNCTURE: CPT | Performed by: INTERNAL MEDICINE

## 2025-01-13 RX ORDER — VENLAFAXINE 37.5 MG/1
37.5 TABLET ORAL
Qty: 90 TABLET | Refills: 3 | Status: SHIPPED | OUTPATIENT
Start: 2025-01-13

## 2025-01-13 NOTE — PROGRESS NOTES
[facility]    2025 1:06 PM    Assessment/Plan:    Patient presents today with therapeutic INR.    Assessment of patient findings and chart review: no significant findings     Recommendation for patient's warfarin regimen: Continue current maintenance dose    Recommend repeat INR in 1 week  _________________________________________________________________    Emmanuelleaimee Abbott (64 y.o.) is followed by the [facility].    Anticoagulation Summary  As of 2025      INR goal:  2.0-3.0   TTR:  66.3% (8.7 mo)   INR used for dosin.4 (2025)   Warfarin maintenance plan:  1.25 mg (2.5 mg x 0.5) every Mon, Wed, Fri; 2.5 mg (2.5 mg x 1) all other days   Weekly warfarin total:  13.75 mg   Plan last modified:  Jesenia Jensen, PharmD (2025)   Next INR check:  2025   Target end date:  --    Indications    LVAD (left ventricular assist device) present [Z95.811]                 Anticoagulation Episode Summary       INR check location:  --    Preferred lab:  --    Send INR reminders to:  [facility] COUMADIN LVAD    Comments:  [place]          Anticoagulation Care Providers       Provider Role Specialty Phone number    Sajan Hurley MD [facility] Cardiology [phone]

## 2025-01-15 ENCOUNTER — TELEPHONE (OUTPATIENT)
Dept: TRANSPLANT | Facility: CLINIC | Age: 65
End: 2025-01-15
Payer: MEDICAID

## 2025-01-15 NOTE — SUBJECTIVE & OBJECTIVE
Interval History:   HD completed yesterday, tolerated well with adequate metabolic clearance.  Reports dark stools on exam this morning noting a decrease in hgb to 6.7//21.8.  UOP of 625 ml/24h.      Review of patient's allergies indicates:  No Known Allergies  Current Facility-Administered Medications   Medication Dose Route Frequency Provider Last Rate Last Admin    acetaminophen tablet 1,000 mg  1,000 mg Oral Q8H PRN Lucy Brand MD   1,000 mg at 04/25/24 2034    amiodarone tablet 400 mg  400 mg Oral Daily Lucy Brand MD   400 mg at 04/26/24 0850    ampicillin (OMNIPEN) 2 g in sodium chloride 0.9 % 100 mL IVPB (MB+)  2 g Intravenous Q12H Axel Caban MD   Stopped at 04/26/24 1055    cefTRIAXone (ROCEPHIN) 2 g in dextrose 5 % in water (D5W) 100 mL IVPB (MB+)  2 g Intravenous Q12H Axel Caban  mL/hr at 04/26/24 1058 2 g at 04/26/24 1058    dextrose 10% bolus 125 mL 125 mL  12.5 g Intravenous PRN Erasmo Parrish DNP, FNP        dextrose 10% bolus 250 mL 250 mL  25 g Intravenous PRN Erasmo Parrish DNP, FNP        famotidine tablet 20 mg  20 mg Oral Daily Lucy Brand MD   20 mg at 04/26/24 0850    gabapentin capsule 100 mg  100 mg Oral BID Javed Vivas MD   100 mg at 04/26/24 0849    glucagon (human recombinant) injection 1 mg  1 mg Intramuscular PRN Erasmo Parrish DNP, FNP        glucose chewable tablet 16 g  16 g Oral PRN Erasmo Parrish DNP, FNP        glucose chewable tablet 24 g  24 g Oral PRN Erasmo Parrish DNP, FNP        hydrALAZINE tablet 75 mg  75 mg Oral Q8H Lucy Brand MD   75 mg at 04/26/24 0521    insulin aspart U-100 pen 0-5 Units  0-5 Units Subcutaneous QID (AC + HS) PRN Majeste, Erasmo C., DNP, FNP        LIDOcaine-EPINEPHrine 1%-1:100,000 injection 1 mL  1 mL Epidural Once Angela Shen MD        magnesium oxide tablet 400 mg  400 mg Oral Daily Lucy Brand MD   400 mg at 04/26/24 2642    senna-docusate  8.6-50 mg per tablet 2 tablet  2 tablet Oral Daily PRN Lucy Brand MD        sodium bicarbonate tablet 325 mg  325 mg Oral TID Lucy Brand MD   325 mg at 04/26/24 0849    trazodone split tablet 25 mg  25 mg Oral QHS Lucy Brand MD   25 mg at 04/25/24 2035    venlafaxine tablet 37.5 mg  37.5 mg Oral Daily Lucy Brand MD   37.5 mg at 04/26/24 0850    warfarin (COUMADIN) tablet 2 mg  2 mg Oral Daily Brayan Ocampo MD           Objective:     Vital Signs (Most Recent):  Temp: 98.1 °F (36.7 °C) (04/26/24 0755)  Pulse: 84 (04/26/24 1106)  Resp: 18 (04/26/24 0755)  BP: 104/63 (04/26/24 0800)  SpO2: 98 % (04/26/24 0755) Vital Signs (24h Range):  Temp:  [97.4 °F (36.3 °C)-98.1 °F (36.7 °C)] 98.1 °F (36.7 °C)  Pulse:  [80-99] 84  Resp:  [17-18] 18  SpO2:  [94 %-99 %] 98 %  BP: ()/(0-84) 104/63     Weight: 79.8 kg (175 lb 14.8 oz) (04/26/24 0430)  Body mass index is 22.59 kg/m².  Body surface area is 2.04 meters squared.    I/O last 3 completed shifts:  In: 1070.5 [P.O.:1070.5]  Out: 1225 [Urine:625; Other:600]     Physical Exam  Vitals and nursing note reviewed.   Constitutional:       General: He is not in acute distress.     Appearance: Normal appearance. He is not ill-appearing or toxic-appearing.   HENT:      Head: Normocephalic and atraumatic.   Eyes:      General: No scleral icterus.        Right eye: No discharge.         Left eye: No discharge.      Extraocular Movements: Extraocular movements intact.      Conjunctiva/sclera: Conjunctivae normal.   Cardiovascular:      Comments: LVAD Hum  Pulmonary:      Effort: Pulmonary effort is normal. No respiratory distress.      Breath sounds: No wheezing or rales.   Abdominal:      General: There is no distension.      Palpations: Abdomen is soft.      Tenderness: There is no abdominal tenderness.   Musculoskeletal:      Cervical back: Normal range of motion and neck supple.      Right lower leg: No edema.      Left lower leg: No  edema.   Neurological:      General: No focal deficit present.      Mental Status: He is alert and oriented to person, place, and time.   Psychiatric:         Mood and Affect: Mood normal.         Behavior: Behavior normal.          Significant Labs:  CBC:   Recent Labs   Lab 04/26/24  0400   WBC 4.65   RBC 2.45*   HGB 6.7*   HCT 21.8*      MCV 89   MCH 27.3   MCHC 30.7*     CMP:   Recent Labs   Lab 04/26/24  0400      CALCIUM 8.2*   ALBUMIN 2.4*   PROT 6.5      K 3.9   CO2 24      BUN 27*   CREATININE 3.2*   ALKPHOS 180*   *   AST 73*   BILITOT 0.2         [FreeTextEntry2] : Follow up

## 2025-01-15 NOTE — TELEPHONE ENCOUNTER
[facility] paged to ask about patient's sessions. They stated that the patient is up 13 kgs and they would like to run his sessions for 4 hours instead of 3 and do a 4th session to pull off all extra fluid and resume his 3 day a week sessions. Explained that if patient is able to tolerate both ok to proceed based off their assessments for dialysis needs. Explained what to monitor for and to page if patient develops symptoms during session. RN verbalized understanding and agreement.

## 2025-01-23 NOTE — PROGRESS NOTES
01/23/2025-Arlyn stated pt couldn't go to lab for INR due to the weather and will for INR on 01/24/25.  INR scheduled at [facility] at 1:40pm and Arlyn stated she will take pt around 10:45am, comments placed under appointment that pt will be in around 10:45 AM for INR.

## 2025-01-24 ENCOUNTER — ANTI-COAG VISIT (OUTPATIENT)
Dept: CARDIOLOGY | Facility: CLINIC | Age: 65
End: 2025-01-24
Payer: MEDICAID

## 2025-01-24 ENCOUNTER — LAB VISIT (OUTPATIENT)
Dept: LAB | Facility: HOSPITAL | Age: 65
End: 2025-01-24
Attending: INTERNAL MEDICINE
Payer: MEDICAID

## 2025-01-24 DIAGNOSIS — Z95.811 LVAD (LEFT VENTRICULAR ASSIST DEVICE) PRESENT: Primary | ICD-10-CM

## 2025-01-24 DIAGNOSIS — Z95.811 PRESENCE OF VENTRICULAR ASSIST DEVICE: ICD-10-CM

## 2025-01-24 LAB
INR PPP: 2.9 (ref 0.8–1.2)
PROTHROMBIN TIME: 30.7 SEC (ref 9–12.5)

## 2025-01-24 PROCEDURE — 36415 COLL VENOUS BLD VENIPUNCTURE: CPT | Performed by: INTERNAL MEDICINE

## 2025-01-24 PROCEDURE — 93793 ANTICOAG MGMT PT WARFARIN: CPT | Mod: ,,,

## 2025-01-24 PROCEDURE — 85610 PROTHROMBIN TIME: CPT | Performed by: INTERNAL MEDICINE

## 2025-01-24 NOTE — PROGRESS NOTES
[facility]    2025 11:57 AM    Assessment/Plan:    Patient presents today with therapeutic INR.    Assessment of patient findings and chart review: no significant findings     Recommendation for patient's warfarin regimen: Continue current maintenance dose    Recommend repeat INR in 1 week  _________________________________________________________________    Radha Abbott (64 y.o.) is followed by the [facility].    Anticoagulation Summary  As of 2025      INR goal:  2.0-3.0   TTR:  67.7% (9.1 mo)   INR used for dosin.9 (2025)   Warfarin maintenance plan:  1.25 mg (2.5 mg x 0.5) every Mon, Wed, Fri; 2.5 mg (2.5 mg x 1) all other days   Weekly warfarin total:  13.75 mg   Plan last modified:  Jesenia Jensen, PharmD (2025)   Next INR check:  2025   Target end date:  --    Indications    LVAD (left ventricular assist device) present [Z95.811]                 Anticoagulation Episode Summary       INR check location:  --    Preferred lab:  --    Send INR reminders to:  [facility] COUMADIN LVAD    Comments:  [place]          Anticoagulation Care Providers       Provider Role Specialty Phone number    Sajan Hurley MD [facility] [phone]

## 2025-01-24 NOTE — PROGRESS NOTES
Arlyn stated pt had light red blood on tissue on 1/21/25 and 1/22/25 after bowel movement, denies blood in the toilet, stated she examined pt's rectum and she didn't see anything that looked abnormal, no other changes reported.

## 2025-01-29 ENCOUNTER — TELEPHONE (OUTPATIENT)
Dept: TRANSPLANT | Facility: CLINIC | Age: 65
End: 2025-01-29
Payer: MEDICAID

## 2025-01-29 NOTE — TELEPHONE ENCOUNTER
Patient's wife called to inform that patient wiped blood after a BM yesterday and she saw blood in his stool later yesterday. I asked about hemorrhoids and wife did examine him but did not see hemorrhoids. Patient denies lightheadedness or dizziness. Patient is being seen in [facility] tomorrow. I advised that if patient continues to see blood in stool and/or become symptomatic, he needs to page on-call and report to the ER. I also stated we could do labs today but wife said no since he comes to clinic tomorrow but she will take him to the ER if more blood or starts with symptoms.

## 2025-01-30 ENCOUNTER — ANTI-COAG VISIT (OUTPATIENT)
Dept: CARDIOLOGY | Facility: CLINIC | Age: 65
End: 2025-01-30
Payer: MEDICAID

## 2025-01-30 ENCOUNTER — CLINICAL SUPPORT (OUTPATIENT)
Dept: TRANSPLANT | Facility: CLINIC | Age: 65
End: 2025-01-30
Payer: MEDICAID

## 2025-01-30 ENCOUNTER — LAB VISIT (OUTPATIENT)
Dept: LAB | Facility: HOSPITAL | Age: 65
End: 2025-01-30
Attending: INTERNAL MEDICINE
Payer: MEDICAID

## 2025-01-30 ENCOUNTER — OFFICE VISIT (OUTPATIENT)
Dept: TRANSPLANT | Facility: CLINIC | Age: 65
End: 2025-01-30
Attending: INTERNAL MEDICINE
Payer: MEDICAID

## 2025-01-30 VITALS — WEIGHT: 180 LBS | TEMPERATURE: 98 F | BODY MASS INDEX: 27.28 KG/M2 | HEIGHT: 68 IN | SYSTOLIC BLOOD PRESSURE: 86 MMHG

## 2025-01-30 DIAGNOSIS — Z99.2 CKD (CHRONIC KIDNEY DISEASE) STAGE V REQUIRING CHRONIC DIALYSIS: ICD-10-CM

## 2025-01-30 DIAGNOSIS — Z95.811 HEART REPLACED BY HEART ASSIST DEVICE: Primary | ICD-10-CM

## 2025-01-30 DIAGNOSIS — I48.0 PAF (PAROXYSMAL ATRIAL FIBRILLATION): ICD-10-CM

## 2025-01-30 DIAGNOSIS — Z95.811 LVAD (LEFT VENTRICULAR ASSIST DEVICE) PRESENT: ICD-10-CM

## 2025-01-30 DIAGNOSIS — M89.9 CHRONIC KIDNEY DISEASE-MINERAL AND BONE DISORDER: ICD-10-CM

## 2025-01-30 DIAGNOSIS — N18.6 CKD (CHRONIC KIDNEY DISEASE) STAGE V REQUIRING CHRONIC DIALYSIS: ICD-10-CM

## 2025-01-30 DIAGNOSIS — N18.9 CHRONIC KIDNEY DISEASE-MINERAL AND BONE DISORDER: ICD-10-CM

## 2025-01-30 DIAGNOSIS — E83.9 CHRONIC KIDNEY DISEASE-MINERAL AND BONE DISORDER: ICD-10-CM

## 2025-01-30 DIAGNOSIS — I50.42 CHRONIC COMBINED SYSTOLIC AND DIASTOLIC CHF, NYHA CLASS 4: Primary | Chronic | ICD-10-CM

## 2025-01-30 DIAGNOSIS — Z95.811 PRESENCE OF VENTRICULAR ASSIST DEVICE: ICD-10-CM

## 2025-01-30 DIAGNOSIS — Z95.811 LVAD (LEFT VENTRICULAR ASSIST DEVICE) PRESENT: Primary | ICD-10-CM

## 2025-01-30 PROBLEM — E87.6 HYPOKALEMIA DUE TO EXCESSIVE GASTROINTESTINAL LOSS OF POTASSIUM: Status: RESOLVED | Noted: 2023-10-08 | Resolved: 2025-01-30

## 2025-01-30 PROBLEM — I47.20 VENTRICULAR TACHYCARDIA: Status: RESOLVED | Noted: 2023-10-07 | Resolved: 2025-01-30

## 2025-01-30 PROBLEM — R34 OLIGURIA: Status: RESOLVED | Noted: 2023-12-14 | Resolved: 2025-01-30

## 2025-01-30 PROBLEM — I49.01 VENTRICULAR FIBRILLATION: Status: RESOLVED | Noted: 2023-10-12 | Resolved: 2025-01-30

## 2025-01-30 LAB
ALBUMIN SERPL BCP-MCNC: 2.8 G/DL (ref 3.5–5.2)
ALP SERPL-CCNC: 108 U/L (ref 40–150)
ALT SERPL W/O P-5'-P-CCNC: 31 U/L (ref 10–44)
ANION GAP SERPL CALC-SCNC: 12 MMOL/L (ref 8–16)
AST SERPL-CCNC: 26 U/L (ref 10–40)
BASOPHILS # BLD AUTO: 0.04 K/UL (ref 0–0.2)
BASOPHILS NFR BLD: 0.6 % (ref 0–1.9)
BILIRUB DIRECT SERPL-MCNC: 0.2 MG/DL (ref 0.1–0.3)
BILIRUB SERPL-MCNC: 0.6 MG/DL (ref 0.1–1)
BNP SERPL-MCNC: 342 PG/ML (ref 0–99)
BUN SERPL-MCNC: 39 MG/DL (ref 8–23)
CALCIUM SERPL-MCNC: 9 MG/DL (ref 8.7–10.5)
CHLORIDE SERPL-SCNC: 100 MMOL/L (ref 95–110)
CO2 SERPL-SCNC: 23 MMOL/L (ref 23–29)
CREAT SERPL-MCNC: 6.2 MG/DL (ref 0.5–1.4)
CRP SERPL-MCNC: 38.3 MG/L (ref 0–8.2)
DIFFERENTIAL METHOD BLD: ABNORMAL
EOSINOPHIL # BLD AUTO: 0.3 K/UL (ref 0–0.5)
EOSINOPHIL NFR BLD: 4.1 % (ref 0–8)
ERYTHROCYTE [DISTWIDTH] IN BLOOD BY AUTOMATED COUNT: 19.5 % (ref 11.5–14.5)
EST. GFR  (NO RACE VARIABLE): 9.5 ML/MIN/1.73 M^2
GLUCOSE SERPL-MCNC: 176 MG/DL (ref 70–110)
HCT VFR BLD AUTO: 38.8 % (ref 40–54)
HGB BLD-MCNC: 11.5 G/DL (ref 14–18)
IMM GRANULOCYTES # BLD AUTO: 0.06 K/UL (ref 0–0.04)
IMM GRANULOCYTES NFR BLD AUTO: 0.9 % (ref 0–0.5)
INR PPP: 2.2 (ref 0.8–1.2)
LDH SERPL L TO P-CCNC: 330 U/L (ref 110–260)
LYMPHOCYTES # BLD AUTO: 0.7 K/UL (ref 1–4.8)
LYMPHOCYTES NFR BLD: 11.3 % (ref 18–48)
MAGNESIUM SERPL-MCNC: 2 MG/DL (ref 1.6–2.6)
MCH RBC QN AUTO: 26.9 PG (ref 27–31)
MCHC RBC AUTO-ENTMCNC: 29.6 G/DL (ref 32–36)
MCV RBC AUTO: 91 FL (ref 82–98)
MONOCYTES # BLD AUTO: 0.4 K/UL (ref 0.3–1)
MONOCYTES NFR BLD: 6.7 % (ref 4–15)
NEUTROPHILS # BLD AUTO: 5 K/UL (ref 1.8–7.7)
NEUTROPHILS NFR BLD: 76.4 % (ref 38–73)
NRBC BLD-RTO: 0 /100 WBC
PHOSPHATE SERPL-MCNC: 3.7 MG/DL (ref 2.7–4.5)
PLATELET # BLD AUTO: 263 K/UL (ref 150–450)
PMV BLD AUTO: 9.8 FL (ref 9.2–12.9)
POTASSIUM SERPL-SCNC: 4.8 MMOL/L (ref 3.5–5.1)
PREALB SERPL-MCNC: 28 MG/DL (ref 20–43)
PROT SERPL-MCNC: 8.1 G/DL (ref 6–8.4)
PROTHROMBIN TIME: 22.3 SEC (ref 9–12.5)
RBC # BLD AUTO: 4.27 M/UL (ref 4.6–6.2)
SODIUM SERPL-SCNC: 135 MMOL/L (ref 136–145)
WBC # BLD AUTO: 6.56 K/UL (ref 3.9–12.7)

## 2025-01-30 PROCEDURE — 85610 PROTHROMBIN TIME: CPT | Performed by: INTERNAL MEDICINE

## 2025-01-30 PROCEDURE — 84100 ASSAY OF PHOSPHORUS: CPT | Performed by: INTERNAL MEDICINE

## 2025-01-30 PROCEDURE — 83735 ASSAY OF MAGNESIUM: CPT | Performed by: INTERNAL MEDICINE

## 2025-01-30 PROCEDURE — 83880 ASSAY OF NATRIURETIC PEPTIDE: CPT | Performed by: INTERNAL MEDICINE

## 2025-01-30 PROCEDURE — 93750 INTERROGATION VAD IN PERSON: CPT | Mod: S$PBB,,, | Performed by: INTERNAL MEDICINE

## 2025-01-30 PROCEDURE — 99213 OFFICE O/P EST LOW 20 MIN: CPT | Mod: PBBFAC | Performed by: INTERNAL MEDICINE

## 2025-01-30 PROCEDURE — 83615 LACTATE (LD) (LDH) ENZYME: CPT | Performed by: INTERNAL MEDICINE

## 2025-01-30 PROCEDURE — 36415 COLL VENOUS BLD VENIPUNCTURE: CPT | Performed by: INTERNAL MEDICINE

## 2025-01-30 PROCEDURE — 80053 COMPREHEN METABOLIC PANEL: CPT | Performed by: INTERNAL MEDICINE

## 2025-01-30 PROCEDURE — 85025 COMPLETE CBC W/AUTO DIFF WBC: CPT | Performed by: INTERNAL MEDICINE

## 2025-01-30 PROCEDURE — 84134 ASSAY OF PREALBUMIN: CPT | Performed by: INTERNAL MEDICINE

## 2025-01-30 PROCEDURE — 99214 OFFICE O/P EST MOD 30 MIN: CPT | Mod: S$PBB,,, | Performed by: INTERNAL MEDICINE

## 2025-01-30 PROCEDURE — 3008F BODY MASS INDEX DOCD: CPT | Mod: CPTII,,, | Performed by: INTERNAL MEDICINE

## 2025-01-30 PROCEDURE — 82248 BILIRUBIN DIRECT: CPT | Performed by: INTERNAL MEDICINE

## 2025-01-30 PROCEDURE — 86140 C-REACTIVE PROTEIN: CPT | Performed by: INTERNAL MEDICINE

## 2025-01-30 PROCEDURE — 99999 PR PBB SHADOW E&M-EST. PATIENT-LVL III: CPT | Mod: PBBFAC,,, | Performed by: INTERNAL MEDICINE

## 2025-01-30 RX ORDER — TRAZODONE HYDROCHLORIDE 50 MG/1
25 TABLET ORAL NIGHTLY
Qty: 45 TABLET | Refills: 3 | Status: CANCELLED | OUTPATIENT
Start: 2025-01-30 | End: 2026-01-30

## 2025-01-30 NOTE — PROGRESS NOTES
[facility]    2025 8:15 AM    Assessment/Plan:    Patient presents today with therapeutic INR.    Assessment of patient findings and chart review: no significant findings     Recommendation for patient's warfarin regimen: Continue current maintenance dose    Recommend repeat INR in 1 week  _________________________________________________________________    Radha Abbott (64 y.o.) is followed by the [facility].    Anticoagulation Summary  As of 2025      INR goal:  2.0-3.0   TTR:  68.4% (9.3 mo)   INR used for dosin.2 (2025)   Warfarin maintenance plan:  1.25 mg (2.5 mg x 0.5) every Mon, Wed, Fri; 2.5 mg (2.5 mg x 1) all other days   Weekly warfarin total:  13.75 mg   Plan last modified:  Jesenia Jensen, PharmD (2025)   Next INR check:  2025   Target end date:  --    Indications    LVAD (left ventricular assist device) present [Z95.811]                 Anticoagulation Episode Summary       INR check location:  --    Preferred lab:  --    Send INR reminders to:  [facility] COUMADIN LVAD    Comments:  [place]          Anticoagulation Care Providers       Provider Role Specialty Phone number    Sajan Hurley MD [facility] [phone]

## 2025-01-30 NOTE — LETTER
January 30, 2025        Kevin Franklin  [address] [place] [address]  Phone: [phone]  Fax: [phone]             [place] [facility]  [address]  [facility] [address]  Phone: [phone]   Patient: Radha Abbott   MR Number: [id number]   YOB: 1960   Date of Visit: 1/30/2025       Dear Dr. Kevin Franklin    Thank you for referring Radha Abbott to me for evaluation. Attached you will find relevant portions of my assessment and plan of care.    If you have questions, please do not hesitate to call me. I look forward to following Radha Abbott along with you.    Sincerely,    Sajan Casas MD    Enclosure    If you would like to receive this communication electronically, please contact [email or web address] or [phone] to request [employer] Link access.    [employer] Link is a tool which provides read-only access to select patient information with whom you have a relationship. Its easy to use and provides real time access to review your patients record including encounter summaries, notes, results, and demographic information.    If you feel you have received this communication in error or would no longer like to receive these types of communications, please e-mail [email or web address]

## 2025-01-30 NOTE — PROCEDURES
1/30/2025     8:35 AM 12/19/2024    10:41 AM 11/21/2024    10:30 AM 10/24/2024    10:09 AM 10/16/2024     7:43 PM 9/26/2024    10:21 AM 8/29/2024    11:07 AM   TXP PINA INTERROGATIONS   Type HeartMate3 HeartMate3 HeartMate3 HeartMate3  HeartMate3 HeartMate3   Flow 3.6 3.2 3.5 3.7  3.8 4   Speed 5000 5000 5000 5050  5000 5000   PI 6.8 8.1 8.6 5.2  6.8 4   Power ([place]) 3.6 3.5 3.5 2.5  3.5 3.4   LSL 4600 4600 4600 4600  4600 4600   Pulsatility Intermittent pulse No Pulse Pulse No Pulse Intermittent pulse No Pulse No Pulse   }

## 2025-01-30 NOTE — PROGRESS NOTES
Daron identified specific Controllers distributed since March 2024 that may have a lifted screen at the edge, near the Display Button (Figure 1). If exposed to fluid, this lift may allow fluid to enter under the screen, potentially affecting the Controller. This may result in abnormal system behavior, such as visual alarms, issues with the display LED lights, or unresponsive buttons. Note: audible alarms are not affected.  WHAT YOU NEED TO DO:   1. Carefully inspect your Controller at the edge near the Display Button to confirm there is no lift (see below images).     2. If your controllers have a lifted screen, report it to your hospital contact by providing the serial number.     3. Continue to perform the Controller Self-Test by following the instructions in your Patient Handbook on pages 42-43 to confirm your Controller is working. Perform the test at least once per day.     4. Continue to protect your Controller from water or moisture as detailed in the Patient Handbook:   a. Always keep your Controller dry.    b. If you are allowed by your doctor to shower, you must use the Shower Bag for every shower. The   Shower Bag protects the external system components from water and moisture. i. See pages 152-157 of the Patient Handbook on how to use the Shower Bag and the image below of a Controller stored within the Shower Bag. If a replacement shower bag is needed reach out to your hospital contact for component replacements.    c. Never swim, take tub baths, or submerge the Controller in water.        Equipment:  Any Equipment Needs: Routine Maintenance    Emergency Bag  Emergency Equipment With Patient: Yes   Condition of emergency bag: No visible damage  Contents of emergency bag: Backup controller, 2 fully charged batteries, 2 battery clips, reference cards    Maintenance Tracking  Patient has monthly checklist today: No  Patient correctly utilizing monthly checklist: Yes  Educated patient on utilizing monthly  checklist correctly and importance of this: Yes    Equipment Inspection  Inspected patient's equipment today: Yes  Equipment clean and free of debris, including locking mechanism: Yes  Cleaned equipment today and educated patient on how to do this: Yes  Manual and visual inspection of driveline: NO   Kinks, rescue tape, tears: No  Clamshell repair: No  Perc lead repair: No    Backup controller charged and self test passed.    Patient wearing waist strap, vest, vielka vest, concealed carry shirt: Concealed carry shirt  Condition of this: NO visible damage      Equipment Needs  Equipment issued to patient today in clinic: No   Discussed with MCS Coordinator and physician: Yes  Items Under Warranty No V[facility] Coordinator Notified Yes  Equipment loaned to patient today: No   Waveforms sent: No   It is medically necessary to ensure patient has properly functioning equipment and wearables to prevent infection, injury or death to patient.

## 2025-01-30 NOTE — PROGRESS NOTES
a                                                                                       [facility] Follow up.      Attending Physician: Sajan Casas MD.  The patient's last visit with me was on 11/21/2024.         HPI.  Mr. Radha Abbott is a 65 yo male with stage D HFrEF, ICMP who underwent HM3 placement placed by Dr Washington on 12/1/23. Lengthy post op course complicated by vasoplegia(requiring Giaprezza), debility, malnutrition/impaired swallowing, and renal failure requiring HD (since weaned off). Once medically stable, he was transferred to Rehab for further PT/OT/ST. He had done well for a while but then had several clinical events including; worsening renal failure and  COVID/pneumonia. Was discharged and readmitted again for C diff for which he was successfully treated. He is on HD now on MWF (3 hours sessions).      November 21, 2024: patient feels better. He is going to outpatient rehab twice a week. He feels he is gradually getting stronger. He continues to complain of tingling in feet.    Review of Systems     Past Medical History:   Diagnosis Date    Aspiration pneumonia of both lungs 06/17/2024    CAD (coronary artery disease)     CHF (congestive heart failure)     Delirium 06/16/2024    Diabetes mellitus     HFrEF (heart failure with reduced ejection fraction)     Hypoglycemia 06/12/2024    ICD (implantable cardioverter-defibrillator) in place     LVAD (left ventricular assist device) present 12/01/2023    MI, old     PAF (paroxysmal atrial fibrillation) 10/11/2023    Stage 4 chronic kidney disease 02/15/2024    Type 2 diabetes mellitus without complication, without long-term current use of insulin 10/07/2023        Past Surgical History:   Procedure Laterality Date    ANGIOPLASTY-VENOUS ARTERY Right 12/1/2023    Procedure: ANGIOPLASTY-VENOUS ARTERY, RIGHT FEMORAL;  Surgeon: Yuri Washington MD;  Location: [facility] OR [address] [place] [address];  Service: Cardiovascular;   Laterality: Right;    AORTIC VALVULOPLASTY N/A 12/1/2023    Procedure: REPAIR, AORTIC VALVE;  Surgeon: Yuri Washington MD;  Location: [facility] OR 2ND FLR;  Service: Cardiovascular;  Laterality: N/A;    CARDIAC SURGERY      CLOSURE N/A 12/1/2023    Procedure: CLOSURE, TEMPORARY;  Surgeon: Yuri Washington MD;  Location: [facility] OR 2ND FLR;  Service: Cardiovascular;  Laterality: N/A;    DRAINAGE OF PLEURAL EFFUSION  12/4/2023    Procedure: DRAINAGE, PLEURAL EFFUSION;  Surgeon: Yuri Washington MD;  Location: [facility] OR 2ND FLR;  Service: Cardiovascular;;    INSERTION OF GRAFT TO PERICARDIUM  12/4/2023    Procedure: INSERTION, GRAFT, PERICARDIUM;  Surgeon: Yuri Washington MD;  Location: [facility] OR 2ND FLR;  Service: Cardiovascular;;    INSERTION OF INTRA-AORTIC BALLOON ASSIST DEVICE Right 11/21/2023    Procedure: INSERTION, INTRA-AORTIC BALLOON PUMP;  Surgeon: Finn Cohn MD;  Location: [facility] CATH LAB;  Service: Cardiology;  Laterality: Right;    LEFT VENTRICULAR ASSIST DEVICE Left 12/1/2023    Procedure: INSERTION-LEFT VENTRICULAR ASSIST DEVICE;  Surgeon: Yuri Washington MD;  Location: [facility] OR [facility] FLR;  Service: Cardiovascular;  Laterality: Left;  REDO STERNOTOMY - REDO SAW NEEDED FOR CASE    LYSIS OF ADHESIONS  12/1/2023    Procedure: LYSIS, ADHESIONS;  Surgeon: Yuri Washington MD;  Location: [facility] OR 2ND FLR;  Service: Cardiovascular;;    PLACEMENT OF SWAN ROLANDO CATHETER WITH IMAGING GUIDANCE  11/20/2023    Procedure: INSERTION, CATHETER, SWAN-ROLANDO, WITH IMAGING GUIDANCE;  Surgeon: Sajan Hurley MD;  Location: [facility] CATH LAB;  Service: Cardiology;;    REMOVAL OF TUNNELED CENTRAL VENOUS CATHETER (CVC) N/A 3/1/2024    Procedure: REMOVAL, CATHETER, CENTRAL VENOUS, TUNNELED;  Surgeon: Seble Aguilar MD;  Location: [facility] CATH LAB;  Service: Interventional Nephrology;  Laterality: N/A;    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 12/1/2023    Procedure: REPAIR, ANEURYSM, ARTERY, FEMORAL;  Surgeon: Yuri Washington MD;   [address]  Service: Cardiovascular;  Laterality: Right;  Right Femoral Artery Repair    RIGHT HEART CATHETERIZATION Right 10/10/2023    Procedure: INSERTION, CATHETER, RIGHT HEART;  Surgeon: Bin Gandhi MD;  Location: [facility] CATH LAB;  Service: Cardiology;  Laterality: Right;    RIGHT HEART CATHETERIZATION Right 10/13/2023    Procedure: INSERTION, CATHETER, RIGHT HEART;  Surgeon: Walter Mcintyre MD;  Location: [facility] CATH LAB;  Service: Cardiology;  Laterality: Right;    RIGHT HEART CATHETERIZATION  11/13/2023    RIGHT HEART CATHETERIZATION Right 11/13/2023    Procedure: INSERTION, CATHETER, RIGHT HEART;  Surgeon: Juventino Bermudez Jr., MD;  Location: [facility] CATH LAB;  Service: Cardiology;  Laterality: Right;    RIGHT HEART CATHETERIZATION Right 11/20/2023    Procedure: INSERTION, CATHETER, RIGHT HEART;  Surgeon: Sajan Hurley MD;  Location: [facility] CATH LAB;  Service: Cardiology;  Laterality: Right;    RIGHT HEART CATHETERIZATION Right 1/22/2024    Procedure: INSERTION, CATHETER, RIGHT HEART;  Surgeon: Brayan Ocampo MD;  Location: [facility] CATH LAB;  Service: Cardiology;  Laterality: Right;    STERNAL WOUND CLOSURE N/A 12/4/2023    Procedure: CLOSURE, WOUND, STERNUM;  Surgeon: Yuri Washington MD;  [address];  Service: Cardiovascular;  Laterality: N/A;    STERNOTOMY N/A 12/1/2023    Procedure: STERNOTOMY, REDO;  Surgeon: Yuri Washington MD;  [address];  Service: Cardiovascular;  Laterality: N/A;    VALVULOPLASTY, MITRAL VALVE N/A 12/1/2023    Procedure: VALVULOPLASTY, MITRAL VALVE;  Surgeon: Yuri Washington MD;  [facility];  Service: Cardiovascular;  Laterality: N/A;        No family history on file.     Review of patient's allergies indicates:  No Known Allergies     Current Outpatient Medications   Medication Instructions    amiodarone (PACERONE) 400 mg, Oral, Daily    atorvastatin (LIPITOR) 40 mg, Oral, Nightly    gabapentin (NEURONTIN) 300  "mg, Nightly    omega-3 acid ethyl esters (LOVAZA) 2 g, Oral, 2 times daily    oxyCODONE (ROXICODONE) 5 mg, Oral, Every 4 hours PRN    pantoprazole (PROTONIX) 40 mg, Oral, Daily    pulse oximeter (PULSE OXIMETER) device Apply Externally, 2 times daily, Use twice daily at 8 AM and 3 PM and record the value in [facility] as directed.    senna-docusate 8.6-50 mg (PERICOLACE) 8.6-50 mg per tablet 2 tablets, Oral, Daily PRN    traZODone (DESYREL) 25 mg, Oral, Nightly    venlafaxine (EFFEXOR) 37.5 mg, Oral    vitamin D (VITAMIN D3) 1,000 Units, Oral, Daily    warfarin (COUMADIN) 2.5 MG tablet TAKE 1 & 1/2 (ONE & ONE-HALF) TABLETS BY MOUTH ONCE DAILY EXCEPT  MONDAY WEDNESDAY  AND  FRIDAY  OR  AS  DIRECTED  BY  COUMADIN  CLINIC        Vitals:    01/30/25 0803   BP: (!) 86/0   Temp: 98.3 °F (36.8 °C)        Wt Readings from Last 3 Encounters:   01/30/25 81.6 kg (180 lb)   12/19/24 78 kg (171 lb 15.3 oz)   12/19/24 82.1 kg (181 lb)     Temp Readings from Last 3 Encounters:   01/30/25 98.3 °F (36.8 °C) (Oral)   12/19/24 98.4 °F (36.9 °C) (Oral)   11/21/24 98 °F (36.7 °C) (Oral)     BP Readings from Last 3 Encounters:   01/30/25 (!) 86/0   12/19/24 (!) 84/0   12/19/24 (!) 84/0     Pulse Readings from Last 3 Encounters:   12/19/24 80   12/19/24 82   11/21/24 80        Body mass index is 27.37 kg/m². Estimated body surface area is 1.98 meters squared as calculated from the following:    Height as of this encounter: 5' 8" (1.727 m).    Weight as of this encounter: 81.6 kg (180 lb).     Physical Exam     Lab Results   Component Value Date     (H) 01/30/2025     (L) 01/30/2025    K 4.8 01/30/2025    MG 2.0 01/30/2025     01/30/2025    CO2 23 01/30/2025    BUN 39 (H) 01/30/2025    CREATININE 6.2 (H) 01/30/2025     (H) 01/30/2025    HGBA1C 6.0 (H) 07/25/2024    AST 26 01/30/2025    ALT 31 01/30/2025    ALBUMIN 2.8 (L) 01/30/2025    PROT 8.1 01/30/2025    BILITOT 0.6 01/30/2025    WBC 6.56 01/30/2025    HGB 11.5 " (L) 01/30/2025    HCT 38.8 (L) 01/30/2025    HCT 39 07/23/2024     01/30/2025    INR 2.2 (H) 01/30/2025    INR 2.6 06/07/2024     (H) 01/30/2025    TSH 0.972 11/21/2024    CHOL 108 (L) 11/21/2024    HDL 52 11/21/2024    LDLCALC 34.4 (L) 11/21/2024    TRIG 108 11/21/2024    [id number] 5.9 11/21/2024       @LABRCNTIP(cpk,cpkmb,troponini,mb)@     No results found for this visit on 01/30/25.       Results for orders placed during the hospital encounter of 12/19/24    Echo    Interpretation Summary    Left Ventricle: The left ventricle is normal in size. Ventricular mass is normal. Normal wall thickness. There is severely reduced systolic function with a visually estimated ejection fraction of 10 -15%. Diastolic function cannot be reliably determined in the presence of mitral valve repair and presence of LVAD. Limtited view od LV apex due to near field artifcat. No obvious thrombus seen.    Right Ventricle: Normal right ventricular cavity size. Wall thickness is normal. Right ventricle wall motion  is normal. Systolic function is moderately reduced.    Left Atrium: Left atrium is moderately dilated.    Right Atrium: Right atrium is mildly dilated. Lead present in the right atrium.    Mitral Valve: The mitral valve is repaired with an Noble stitch.    Aorta: Aortic root is normal in size measuring 2.77 cm. Ascending aorta is normal measuring 3.26 cm.    Pulmonary Artery: The estimated pulmonary artery systolic pressure is 33 mmHg.    IVC/SVC: Intermediate venous pressure at 8 mmHg.    Pericardium: There is no pericardial effusion.        Results for orders placed during the hospital encounter of 03/01/24    Cardiac catheterization    Narrative  Procedure performed in the Invasive Lab  - See Procedure Log link below for nursing documentation  - See OpNote on Surgeries Tab for physician findings  - See Imaging Tab for radiologist dictation         Assessment and Plan:  Presence of ventricular assist device  -      LVAD Interrogations Out Patient Only    LVAD (left ventricular assist device) present [Z95.811]         Chronic cardiomyopathy and systolic HF, NYHA class 2, stage D, s/p LVAD.  Hemodynamic status: warm, normotensive, centrally hypervolemic, but significantly improved. The examination of JV pulsation reveals a discrepancy between right and left side. It is in his left side where JVP is reliably seen and is elevated 15 cmH20 with + HJR. Agree with Renal /HD plans for increasing fluid removal.    Antithrombotic therapy and target anticoagulation: INR/Prothrombin Time 2.2. Adjustment to warfarin dose per anticoagulation clinic.      LVAD related complications:   -Bleeding (gastrointestinal, epistaxis, etc): HGB 11.5 is stable.  -RV failure: concealed, normal BP and no low flow alarms on LVAD. High JVP.  -Thrombotic events and LDH: none, ldh 330.    -DLES and Infection: 1, none.         Owing to his advanced kidney disease and being on dialysis, he is not on ACEI/ARB/ARNI or MRA. He is also not on a beta blocker due to RV dysfunction.            VAD interrogation was performed in clinic  Any VAD management kits dispensed today medically necessary  Recommend 2 gram sodium restriction and 1500cc fluid restriction.  Encourage physical activity with graded exercise program.  Requested patient to weigh themselves daily, and to notify us if their weight increases by more than 3 lbs in 1 day or 5 lbs in 1 week.         Listed for transplant: No.     UNOS Patient Status  Functional Status: 40% - Disabled: requires special care and assistance  Physical Capacity: No Limitations  Working for Income: No  If no, reason not working: Demands of Treatment

## 2025-01-30 NOTE — PROGRESS NOTES
Date of Implant with Heartmate 3 LVAD: 12/1/23    PATIENT ARRIVED IN CLINIC:  Ambulatory with rolling walker  Accompanied by: wife  Complaints/reason for visit today: routine    Vitals  Temperature, oral:   Temp Readings from Last 1 Encounters:   01/30/25 98.3 °F (36.8 °C) (Oral)     Blood Pressure:   BP Readings from Last 3 Encounters:   01/30/25 (!) 86/0   12/19/24 (!) 84/0   12/19/24 (!) 84/0        VAD Interrogation:      1/30/2025     8:35 AM 12/19/2024    10:41 AM 11/21/2024    10:30 AM   TXP PINA INTERROGATIONS   Type HeartMate3 HeartMate3 HeartMate3   Flow 3.6 3.2 3.5   Speed 5000 5000 5000   PI 6.8 8.1 8.6   Power ([place]) 3.6 3.5 3.5   LSL 4600 4600 4600   Pulsatility Intermittent pulse No Pulse Pulse     HCT:   Lab Results   Component Value Date    HCT 38.8 (L) 01/30/2025    HCT 39 07/23/2024       VAD Assessment  Problems / Issues /Equipment Needs/ Alarms with VAD if any: None noted  VAD Sounds: HM3 Smooth  VAD Binder With Patient: No  Reviewed VAD Numbers In Binder: No  Emergency Equipment With Patient: Yes   Equipment Needs: No   It is medically necessary to ensure patient has properly functioning equipment and wearables to prevent infection, injury or death to patient.     DLES Assessment and Dressing Care:  Appearance of DLES: 1  Antibiotics: NO  Velour: No  Manual & Visual Inspection Of Driveline: No kinks or tears noted  Stabilization Device In Use: yes, goodman securement device    Heartmate 3 Module Cable:  No yellow exposed and Attempted to unscrew modular cable to ensure it will be able to come lose in the event we ever need to change the modular cable while patient held the driveline in place so it would not move. Modular cable connection able to be unscrewed and re-tightened. Instructed pt to perform this weekly.  Frequency of Dressing Changes: weekly & silverlon kit   Pt In Need Of Management Kits?:no   It is medically necessary to have VAD management kits in order to prevent infection or to  assist in the healing of an infected DLES.    Patient MyChart Questionnaire:        No data to display                 Assessment/ Quality of Life Sur[place]:   Complaints Of Nausea / Vomiting: None noted    Appearance and Frequency Of Stools: normal and formed without blood &  did have one BM with blood prior to a normal BM. Suspicious of internal hemorrhoids as no evidence of external per wife. H/H stable.   Color Of Urine: clear/yellow  Pain: NO  Coping/Depression/Anxiety: coping okay  Sleep Habits: 10 hrs /night  Sleep Aids: None noted  Showering: Yes, reminded to change dressing immediately after drying off  Self- Care I have some problems washing or dressing myself  Mobility I have some problems in walking about  Usual Activities I have some problems with performing my usual activities  Activity/Exercise: walking   Driving: Yes. Reminded to pull over should there be an alarm before looking down at controller.  Additional Comments: n/a    Labs:    Chemistry        Component Value Date/Time     (L) 01/30/2025 0720    K 4.8 01/30/2025 0720     01/30/2025 0720    CO2 23 01/30/2025 0720    BUN 39 (H) 01/30/2025 0720    CREATININE 6.2 (H) 01/30/2025 0720     (H) 01/30/2025 0720        Component Value Date/Time    CALCIUM 9.0 01/30/2025 0720    ALKPHOS 108 01/30/2025 0720    AST 26 01/30/2025 0720    ALT 31 01/30/2025 0720    BILITOT 0.6 01/30/2025 0720    ESTGFRAFRICA >60.0 02/07/2018 0935    EGFRNONAA >60.0 02/07/2018 0935            Magnesium   Date Value Ref Range Status   01/30/2025 2.0 1.6 - 2.6 mg/dL Final       Lab Results   Component Value Date    WBC 6.56 01/30/2025    HGB 11.5 (L) 01/30/2025    HCT 38.8 (L) 01/30/2025    MCV 91 01/30/2025     01/30/2025       Lab Results   Component Value Date    INR 2.2 (H) 01/30/2025    INR 2.9 (H) 01/24/2025    INR 2.4 (H) 01/13/2025       BNP   Date Value Ref Range Status   01/30/2025 342 (H) 0 - 99 pg/mL Final     Comment:     Values of less  than 100 pg/ml are consistent with non-CHF populations.   12/19/2024 630 (H) 0 - 99 pg/mL Final     Comment:     Values of less than 100 pg/ml are consistent with non-CHF populations.   11/21/2024 521 (H) 0 - 99 pg/mL Final     Comment:     Values of less than 100 pg/ml are consistent with non-CHF populations.       LD   Date Value Ref Range Status   01/30/2025 330 (H) 110 - 260 U/L Final     Comment:     Results are increased in hemolyzed samples.   12/19/2024 383 (H) 110 - 260 U/L Final     Comment:     Results are increased in hemolyzed samples.   11/21/2024 339 (H) 110 - 260 U/L Final     Comment:     Results are increased in hemolyzed samples.       Labs reviewed with patient: YES     Medication Reconciliation: per MA.  New Medication Detail Provided: no  Coumadin Managed by: [facility]    Education: Reviewed driveline care, emergency procedures, how to change the controller, alarms with patient, as well as discussed how to page the VAD coordinator in case of an emergency.   Educated patient/family that chest compressions are allowed in the event they are needed.    Reminded patient/caregiver not to touch their face and to cover their mouth when they cough or sneeze.   Vaccines: Pt informed that we are encouraging all VAD patients to receive  vaccines and boosters. Informed pt that they can take tylenol but should avoid other NSAIDs.       Plans/Needs: Routine f/u. No changes made today. PA submitted for Protonix. Will move to 2M f/u.     RTC in 2m with PFTs.      Hurricane Season: No

## 2025-02-05 ENCOUNTER — HOSPITAL ENCOUNTER (EMERGENCY)
Facility: HOSPITAL | Age: 65
Discharge: HOME | End: 2025-02-05
Attending: EMERGENCY MEDICINE
Payer: MEDICAID

## 2025-02-05 ENCOUNTER — PATIENT MESSAGE (OUTPATIENT)
Dept: TRANSPLANT | Facility: CLINIC | Age: 65
End: 2025-02-05
Payer: MEDICAID

## 2025-02-05 VITALS
DIASTOLIC BLOOD PRESSURE: 73 MMHG | SYSTOLIC BLOOD PRESSURE: 95 MMHG | WEIGHT: 191.13 LBS | BODY MASS INDEX: 29.06 KG/M2 | TEMPERATURE: 98 F | RESPIRATION RATE: 17 BRPM | OXYGEN SATURATION: 100 % | HEART RATE: 77 BPM

## 2025-02-05 DIAGNOSIS — N18.6 ESRD (END STAGE RENAL DISEASE): ICD-10-CM
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
## 2025-02-05 DIAGNOSIS — R29.6 UNWITNESSED FALL: ICD-10-CM

## 2025-02-05 LAB
ALBUMIN SERPL BCP-MCNC: 3.3 G/DL (ref 3.5–5.2)
ALP SERPL-CCNC: 127 U/L (ref 40–150)
ALT SERPL W/O P-5'-P-CCNC: 64 U/L (ref 10–44)
ANION GAP SERPL CALC-SCNC: 13 MMOL/L (ref 8–16)
AST SERPL-CCNC: 47 U/L (ref 10–40)
BASOPHILS # BLD AUTO: 0.04 K/UL (ref 0–0.2)
BASOPHILS NFR BLD: 0.6 % (ref 0–1.9)
BILIRUB SERPL-MCNC: 0.6 MG/DL (ref 0.1–1)
BNP SERPL-MCNC: 309 PG/ML (ref 0–99)
BUN SERPL-MCNC: 38 MG/DL (ref 8–23)
CALCIUM SERPL-MCNC: 9.4 MG/DL (ref 8.7–10.5)
CHLORIDE SERPL-SCNC: 98 MMOL/L (ref 95–110)
CO2 SERPL-SCNC: 25 MMOL/L (ref 23–29)
CREAT SERPL-MCNC: 6.2 MG/DL (ref 0.5–1.4)
DIFFERENTIAL METHOD BLD: ABNORMAL
EOSINOPHIL # BLD AUTO: 0.2 K/UL (ref 0–0.5)
EOSINOPHIL NFR BLD: 2.3 % (ref 0–8)
ERYTHROCYTE [DISTWIDTH] IN BLOOD BY AUTOMATED COUNT: 18.2 % (ref 11.5–14.5)
EST. GFR  (NO RACE VARIABLE): 10 ML/MIN/1.73 M^2
GLUCOSE SERPL-MCNC: 181 MG/DL (ref 70–110)
HCT VFR BLD AUTO: 41.3 % (ref 40–54)
HCV AB SERPL QL IA: NEGATIVE
HEP C VIRUS HOLD SPECIMEN: NORMAL
HGB BLD-MCNC: 12.4 G/DL (ref 14–18)
HIV 1+2 AB+HIV1 P24 AG SERPL QL IA: NEGATIVE
IMM GRANULOCYTES # BLD AUTO: 0.11 K/UL (ref 0–0.04)
IMM GRANULOCYTES NFR BLD AUTO: 1.7 % (ref 0–0.5)
INR PPP: 2 (ref 0.8–1.2)
LYMPHOCYTES # BLD AUTO: 0.5 K/UL (ref 1–4.8)
LYMPHOCYTES NFR BLD: 8.3 % (ref 18–48)
MAGNESIUM SERPL-MCNC: 2.1 MG/DL (ref 1.6–2.6)
MCH RBC QN AUTO: 27.1 PG (ref 27–31)
MCHC RBC AUTO-ENTMCNC: 30 G/DL (ref 32–36)
MCV RBC AUTO: 90 FL (ref 82–98)
MONOCYTES # BLD AUTO: 0.4 K/UL (ref 0.3–1)
MONOCYTES NFR BLD: 6.4 % (ref 4–15)
NEUTROPHILS # BLD AUTO: 5.3 K/UL (ref 1.8–7.7)
NEUTROPHILS NFR BLD: 80.7 % (ref 38–73)
NRBC BLD-RTO: 0 /100 WBC
PHOSPHATE SERPL-MCNC: 3.2 MG/DL (ref 2.7–4.5)
PLATELET # BLD AUTO: 249 K/UL (ref 150–450)
PMV BLD AUTO: 9.2 FL (ref 9.2–12.9)
POTASSIUM SERPL-SCNC: 5 MMOL/L (ref 3.5–5.1)
PROT SERPL-MCNC: 9.5 G/DL (ref 6–8.4)
PROTHROMBIN TIME: 22.7 SEC (ref 9–12.5)
RBC # BLD AUTO: 4.57 M/UL (ref 4.6–6.2)
SODIUM SERPL-SCNC: 136 MMOL/L (ref 136–145)
TROPONIN I SERPL DL<=0.01 NG/ML-MCNC: 0.08 NG/ML (ref 0–0.03)
WBC # BLD AUTO: 6.54 K/UL (ref 3.9–12.7)

## 2025-02-05 PROCEDURE — 85610 PROTHROMBIN TIME: CPT | Performed by: EMERGENCY MEDICINE

## 2025-02-05 PROCEDURE — 85025 COMPLETE CBC W/AUTO DIFF WBC: CPT | Performed by: NURSE PRACTITIONER

## 2025-02-05 PROCEDURE — 80053 COMPREHEN METABOLIC PANEL: CPT | Performed by: NURSE PRACTITIONER

## 2025-02-05 PROCEDURE — 84484 ASSAY OF TROPONIN QUANT: CPT | Performed by: NURSE PRACTITIONER

## 2025-02-05 PROCEDURE — 83880 ASSAY OF NATRIURETIC PEPTIDE: CPT | Performed by: NURSE PRACTITIONER

## 2025-02-05 PROCEDURE — 86803 HEPATITIS C AB TEST: CPT | Performed by: EMERGENCY MEDICINE

## 2025-02-05 PROCEDURE — 87389 HIV-1 AG W/HIV-1&-2 AB AG IA: CPT | Performed by: EMERGENCY MEDICINE

## 2025-02-05 PROCEDURE — 83735 ASSAY OF MAGNESIUM: CPT | Performed by: NURSE PRACTITIONER

## 2025-02-05 PROCEDURE — 84100 ASSAY OF PHOSPHORUS: CPT | Performed by: NURSE PRACTITIONER

## 2025-02-05 PROCEDURE — 99285 EMERGENCY DEPT VISIT HI MDM: CPT | Mod: 25

## 2025-02-06 ENCOUNTER — ANTI-COAG VISIT (OUTPATIENT)
Dept: CARDIOLOGY | Facility: CLINIC | Age: 65
End: 2025-02-06
Payer: MEDICAID

## 2025-02-06 DIAGNOSIS — Z95.811 LVAD (LEFT VENTRICULAR ASSIST DEVICE) PRESENT: Primary | ICD-10-CM

## 2025-02-06 PROCEDURE — 93793 ANTICOAG MGMT PT WARFARIN: CPT | Mod: ,,,

## 2025-02-06 NOTE — PROGRESS NOTES
Arlyn stated pt had a fall yesterday and went to [facility] ER. Arlyn stated pt didn't hit his head and she notified VAD Team. Arlyn stated all of pt's tests were normal and he was discharged.

## 2025-02-06 NOTE — ED PROVIDER NOTES
SCRIBE #1 NOTE: I, Elizabeth Roach, am scribing for, and in the presence of, Pj Batres MD. I have scribed the entire note.       History     Chief Complaint   Patient presents with    Fatigue     States he got weak and fell today after dialysis. Denies injury. He is an LVAD pt.      Review of patient's allergies indicates:  No Known Allergies      History of Present Illness     HPI    2/5/2025, 9:48 PM  History obtained from the patient      History of Present Illness: Radha Abbott is a 64 y.o. male patient with a PMHx of DM Type II, MI, CAD, CHF,  hypoglycemia, PAF, and LVAD who presents to the Emergency Department for evaluation of a fall which onset suddenly at home after dialysis. Pt states he went to use the bathroom and fell trying to pull up his pants. Pt states he uses a walker and wheelchair for mobility, but did not use it to use the bathroom. Pt denies any head injury. Pt's daughter stated her vomited after the fall but not unusual for him to vomit after dialysis. Symptoms are constant and moderate in severity. No mitigating or exacerbating factors reported. Patient denies any syncope, light-headedness, dizziness, CP, and all other sxs at this time. No prior tx reported. Pt states he is on blood thinners, warfarin, and take 1/2 a pill Monday, Wednesday, and Friday with a full pill every other day. No further complaints or concerns at this time.       Arrival mode: Personal vehicle    PCP: Vasu Kong MD        Past Medical History:  Past Medical History:   Diagnosis Date    Aspiration pneumonia of both lungs 06/17/2024    CAD (coronary artery disease)     CHF (congestive heart failure)     Delirium 06/16/2024    Diabetes mellitus     HFrEF (heart failure with reduced ejection fraction)     Hypoglycemia 06/12/2024    ICD (implantable cardioverter-defibrillator) in place     LVAD (left ventricular assist device) present 12/01/2023    MI, old     PAF (paroxysmal atrial fibrillation) 10/11/2023     Stage 4 chronic kidney disease 02/15/2024    Type 2 diabetes mellitus without complication, without long-term current use of insulin 10/07/2023       Past Surgical History:  Past Surgical History:   Procedure Laterality Date    ANGIOPLASTY-VENOUS ARTERY Right 12/1/2023    Procedure: ANGIOPLASTY-VENOUS ARTERY, RIGHT FEMORAL;  Surgeon: uYri Washington MD;  [facility];  Service: Cardiovascular;  Laterality: Right;    AORTIC VALVULOPLASTY N/A 12/1/2023    Procedure: REPAIR, AORTIC VALVE;  Surgeon: Yuri Washington MD;  [facility];  Service: Cardiovascular;  Laterality: N/A;    CARDIAC SURGERY      CLOSURE N/A 12/1/2023    Procedure: CLOSURE, TEMPORARY;  Surgeon: Yuri Washington MD;  [facility];  Service: Cardiovascular;  Laterality: N/A;    DRAINAGE OF PLEURAL EFFUSION  12/4/2023    Procedure: DRAINAGE, PLEURAL EFFUSION;  Surgeon: Yuri Washington MD;  [facility];  Service: Cardiovascular;;    INSERTION OF GRAFT TO PERICARDIUM  12/4/2023    Procedure: INSERTION, GRAFT, PERICARDIUM;  Surgeon: Yuri Washington MD;  [facility];  Service: Cardiovascular;;    INSERTION OF INTRA-AORTIC BALLOON ASSIST DEVICE Right 11/21/2023    Procedure: INSERTION, INTRA-AORTIC BALLOON PUMP;  Surgeon: Finn Cohn MD;  Location: [facility] CATH LAB;  Service: Cardiology;  Laterality: Right;    LEFT VENTRICULAR ASSIST DEVICE Left 12/1/2023    Procedure: INSERTION-LEFT VENTRICULAR ASSIST DEVICE;  Surgeon: Yuri Washington MD;  [facility];  Service: Cardiovascular;  Laterality: Left;  REDO STERNOTOMY - REDO SAW NEEDED FOR CASE    LYSIS OF ADHESIONS  12/1/2023    Procedure: LYSIS, ADHESIONS;  Surgeon: Yuri Washington MD;  [facility];  Service: Cardiovascular;;    PLACEMENT OF SWAN ROLANDO CATHETER WITH IMAGING GUIDANCE  11/20/2023    Procedure: INSERTION, CATHETER, SWAN-ROLANDO, WITH IMAGING GUIDANCE;  Surgeon: Sajan Hurley MD;   Location: [facility] CATH LAB;  Service: Cardiology;;    REMOVAL OF TUNNELED CENTRAL VENOUS CATHETER (CVC) N/A 3/1/2024    Procedure: REMOVAL, CATHETER, CENTRAL VENOUS, TUNNELED;  Surgeon: Seble Aguilar MD;  Location: [facility] CATH LAB;  Service: Interventional Nephrology;  Laterality: N/A;    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 12/1/2023    Procedure: REPAIR, ANEURYSM, ARTERY, FEMORAL;  Surgeon: Yuri Washington MD;  Location: [facility] OR [facility] FLR;  Service: Cardiovascular;  Laterality: Right;  Right Femoral Artery Repair    RIGHT HEART CATHETERIZATION Right 10/10/2023    Procedure: INSERTION, CATHETER, RIGHT HEART;  Surgeon: Bin Gandhi MD;  Location: [facility] CATH LAB;  Service: Cardiology;  Laterality: Right;    RIGHT HEART CATHETERIZATION Right 10/13/2023    Procedure: INSERTION, CATHETER, RIGHT HEART;  Surgeon: Walter Mcintyre MD;  Location: [facility] CATH LAB;  Service: Cardiology;  Laterality: Right;    RIGHT HEART CATHETERIZATION  11/13/2023    RIGHT HEART CATHETERIZATION Right 11/13/2023    Procedure: INSERTION, CATHETER, RIGHT HEART;  Surgeon: Juventino Bermudez Jr., MD;  Location: [facility] CATH LAB;  Service: Cardiology;  Laterality: Right;    RIGHT HEART CATHETERIZATION Right 11/20/2023    Procedure: INSERTION, CATHETER, RIGHT HEART;  Surgeon: Sajan Hurley MD;  Location: [facility] CATH LAB;  Service: Cardiology;  Laterality: Right;    RIGHT HEART CATHETERIZATION Right 1/22/2024    Procedure: INSERTION, CATHETER, RIGHT HEART;  Surgeon: Brayan Ocampo MD;  Location: [facility] CATH LAB;  Service: Cardiology;  Laterality: Right;    STERNAL WOUND CLOSURE N/A 12/4/2023    Procedure: CLOSURE, WOUND, STERNUM;  Surgeon: Yuri Washington MD;  Location: [facility] OR 2ND FLR;  Service: Cardiovascular;  Laterality: N/A;    STERNOTOMY N/A 12/1/2023    Procedure: STERNOTOMY, REDO;  Surgeon: Yuri Washington MD;  Location: [facility] OR [facility]ND FLR;  Service: Cardiovascular;  Laterality: N/A;    VALVULOPLASTY, MITRAL VALVE N/A 12/1/2023     Procedure: VALVULOPLASTY, MITRAL VALVE;  Surgeon: Yuri Washington MD;  Location: [facility] OR [address] [place] [address];  Service: Cardiovascular;  Laterality: N/A;         Family History:  No family history on file.    Social History:  Social History     Tobacco Use    Smoking status: Former     Current packs/day: 0.50     Types: Cigarettes    Smokeless tobacco: Not on file   Substance and Sexual Activity    Alcohol use: Yes     Comment: rarely    Drug use: No    Sexual activity: Not Currently     Partners: Female        Review of Systems     Review of Systems   Constitutional:  Positive for fatigue. Negative for fever.   HENT:  Negative for sore throat.    Respiratory:  Negative for shortness of breath.    Cardiovascular:  Negative for chest pain.   Gastrointestinal:  Positive for vomiting. Negative for nausea.   Genitourinary:  Negative for dysuria.   Musculoskeletal:  Negative for back pain.   Skin:  Negative for rash.   Neurological:  Negative for dizziness, syncope and light-headedness.        (-) head injury   Hematological:  Does not bruise/bleed easily.   All other systems reviewed and are negative.     Physical Exam     Initial Vitals [02/05/25 1752]   BP Pulse Resp Temp SpO2   111/73 91 20 98.3 °F (36.8 °C) 100 %      MAP       --          Physical Exam  Nursing Notes and Vital Signs Reviewed.  Constitutional: Patient is in no acute distress. Well-developed and well-nourished.  Head: Atraumatic. Normocephalic.  Eyes: PERRL. EOM intact. Conjunctivae are not pale. No scleral icterus.  ENT: Mucous membranes are moist.  Neck: Supple. Full ROM.   Cardiovascular: LVAD hum. Distal pulses are 2+ and symmetric.  Pulmonary/Chest: Vascath to the right chest.   Abdominal: Soft and non-distended.  There is no tenderness.  No rebound, guarding, or rigidity.   Genitourinary: No CVA tenderness  Musculoskeletal: Moves all extremities. No obvious deformities. No edema.   Skin: Warm and dry.  Neurological:  Alert, awake, and appropriate.  Normal  speech.  No acute focal neurological deficits are appreciated.  Psychiatric: Normal affect. Good eye contact. Appropriate in content.     ED Course   Procedures  ED Vital Signs:  Vitals:    02/05/25 1752 02/05/25 2106 02/05/25 2108 02/05/25 2110   BP: 111/73 109/84 105/85 95/73   Pulse: 91 78 77 76   Resp: 20 19 14 16   Temp: 98.3 °F (36.8 °C)      TempSrc: Oral      SpO2: 100% 100% 100% 100%   Weight: 86.7 kg (191 lb 2.2 oz)       02/05/25 2115   BP: 95/73   Pulse: 77   Resp: 17   Temp: 98 °F (36.7 °C)   TempSrc:    SpO2: 100%   Weight:        Abnormal Lab Results:  Labs Reviewed   CBC W/ AUTO DIFFERENTIAL - Abnormal       Result Value    WBC 6.54      RBC 4.57 (*)     Hemoglobin 12.4 (*)     Hematocrit 41.3      MCV 90      MCH 27.1      MCHC 30.0 (*)     RDW 18.2 (*)     Platelets 249      MPV 9.2      Immature Granulocytes 1.7 (*)     Gran # (ANC) 5.3      Immature Grans (Abs) 0.11 (*)     Lymph # 0.5 (*)     Mono # 0.4      Eos # 0.2      Baso # 0.04      nRBC 0      Gran % 80.7 (*)     Lymph % 8.3 (*)     Mono % 6.4      Eosinophil % 2.3      Basophil % 0.6      Differential Method Automated     COMPREHENSIVE METABOLIC PANEL - Abnormal    Sodium 136      Potassium 5.0      Chloride 98      CO2 25      Glucose 181 (*)     BUN 38 (*)     Creatinine 6.2 (*)     Calcium 9.4      Total Protein 9.5 (*)     Albumin 3.3 (*)     Total Bilirubin 0.6      Alkaline Phosphatase 127      AST 47 (*)     ALT 64 (*)     eGFR 10 (*)     Anion Gap 13     B-TYPE NATRIURETIC PEPTIDE - Abnormal     (*)    TROPONIN I - Abnormal    Troponin I 0.084 (*)    PROTIME-INR - Abnormal    Prothrombin Time 22.7 (*)     INR 2.0 (*)    HEPATITIS C ANTIBODY    Hepatitis C Ab Negative      Narrative:     Release to patient->Immediate   HEP C VIRUS HOLD SPECIMEN    HEP C Virus Hold Specimen Hold for HCV sendout      Narrative:     Release to patient->Immediate   HIV 1 / 2 ANTIBODY    HIV 1/2 Ag/Ab Negative      Narrative:     Release to  patient->Immediate   MAGNESIUM    Magnesium 2.1     PHOSPHORUS    Phosphorus 3.2          All Lab Results:  Results for orders placed or performed during the hospital encounter of 02/05/25   Hepatitis C Antibody    Collection Time: 02/05/25  7:06 PM   Result Value Ref Range    Hepatitis C Ab Negative Negative   HCV Virus Hold Specimen    Collection Time: 02/05/25  7:06 PM   Result Value Ref Range    HEP C Virus Hold Specimen Hold for HCV sendout    HIV 1/2 Ag/Ab (4th Gen)    Collection Time: 02/05/25  7:06 PM   Result Value Ref Range    HIV 1/2 Ag/Ab Negative Negative   CBC auto differential    Collection Time: 02/05/25  7:06 PM   Result Value Ref Range    WBC 6.54 3.90 - 12.70 K/uL    RBC 4.57 (L) 4.60 - 6.20 M/uL    Hemoglobin 12.4 (L) 14.0 - 18.0 g/dL    Hematocrit 41.3 40.0 - 54.0 %    MCV 90 82 - 98 fL    MCH 27.1 27.0 - 31.0 pg    MCHC 30.0 (L) 32.0 - 36.0 g/dL    RDW 18.2 (H) 11.5 - 14.5 %    Platelets 249 150 - 450 K/uL    MPV 9.2 9.2 - 12.9 fL    Immature Granulocytes 1.7 (H) 0.0 - 0.5 %    Gran # (ANC) 5.3 1.8 - 7.7 K/uL    Immature Grans (Abs) 0.11 (H) 0.00 - 0.04 K/uL    Lymph # 0.5 (L) 1.0 - 4.8 K/uL    Mono # 0.4 0.3 - 1.0 K/uL    Eos # 0.2 0.0 - 0.5 K/uL    Baso # 0.04 0.00 - 0.20 K/uL    nRBC 0 0 /100 WBC    Gran % 80.7 (H) 38.0 - 73.0 %    Lymph % 8.3 (L) 18.0 - 48.0 %    Mono % 6.4 4.0 - 15.0 %    Eosinophil % 2.3 0.0 - 8.0 %    Basophil % 0.6 0.0 - 1.9 %    Differential Method Automated    Comprehensive metabolic panel    Collection Time: 02/05/25  7:06 PM   Result Value Ref Range    Sodium 136 136 - 145 mmol/L    Potassium 5.0 3.5 - 5.1 mmol/L    Chloride 98 95 - 110 mmol/L    CO2 25 23 - 29 mmol/L    Glucose 181 (H) 70 - 110 mg/dL    BUN 38 (H) 8 - 23 mg/dL    Creatinine 6.2 (H) 0.5 - 1.4 mg/dL    Calcium 9.4 8.7 - 10.5 mg/dL    Total Protein 9.5 (H) 6.0 - 8.4 g/dL    Albumin 3.3 (L) 3.5 - 5.2 g/dL    Total Bilirubin 0.6 0.1 - 1.0 mg/dL    Alkaline Phosphatase 127 40 - 150 U/L    AST 47 (H) 10 -  40 U/L    ALT 64 (H) 10 - 44 U/L    eGFR 10 (A) >60 mL/min/1.73 m^2    Anion Gap 13 8 - 16 mmol/L   B-Type natriuretic peptide (BNP)    Collection Time: 02/05/25  7:06 PM   Result Value Ref Range     (H) 0 - 99 pg/mL   Troponin I    Collection Time: 02/05/25  7:06 PM   Result Value Ref Range    Troponin I 0.084 (H) 0.000 - 0.026 ng/mL   Magnesium    Collection Time: 02/05/25  7:06 PM   Result Value Ref Range    Magnesium 2.1 1.6 - 2.6 mg/dL   Phosphorus    Collection Time: 02/05/25  7:06 PM   Result Value Ref Range    Phosphorus 3.2 2.7 - 4.5 mg/dL   Protime-INR    Collection Time: 02/05/25  9:30 PM   Result Value Ref Range    Prothrombin Time 22.7 (H) 9.0 - 12.5 sec    INR 2.0 (H) 0.8 - 1.2        Imaging Results:  Imaging Results              X-Ray Chest PA And Lateral (Final result)  Result time 02/05/25 19:07:22      Final result by Tatyana Vargas MD (02/05/25 19:07:22)                   Impression:      Stable chest exam      Electronically signed by: Tatyana Vargas  Date:    02/05/2025  Time:    19:07               Narrative:    EXAMINATION:  XR CHEST PA AND LATERAL    CLINICAL HISTORY:  Chest Pain;    TECHNIQUE:  PA and lateral views of the chest were performed.    COMPARISON:  08/02/2024    FINDINGS:  No new pulmonary opacity.  Minimal blunting left costophrenic angle unchanged.  No convincing pneumothorax.  Mediastinal contour stable with surgical change and pacemaker                                       The EKG was ordered, reviewed, and independently interpreted by the ED provider.  Interpretation time: 20:06  Rate: 78 BPM  Rhythm: normal sinus rhythm  Interpretation: L vad artifact significantly limits interpretation. Appears to be normal sinus rhythm with a rate of 78. Non specific atrial ventriclar block. No definitive STEMI.           The Emergency Provider reviewed the vital signs and test results, which are outlined above.     ED Discussion     10:22 PM: Reassessed pt at this time.  Discussed with pt all pertinent ED information and results. Discussed pt dx and plan of tx. Gave pt all f/u and return to the ED instructions. All questions and concerns were addressed at this time. Pt expresses understanding of information and instructions, and is comfortable with plan to discharge. Pt is stable for discharge.    I discussed with patient and/or family/caretaker that evaluation in the ED does not suggest any emergent or life threatening medical conditions requiring immediate intervention beyond what was provided in the ED, and I believe patient is safe for discharge.  Regardless, an unremarkable evaluation in the ED does not preclude the development or presence of a serious of life threatening condition. As such, patient was instructed to return immediately for any worsening or change in current symptoms.        Medical Decision Making  64-year-old male with a past medical history of end-stage renal disease and LVAD presenting to the emergency department for a ground level fall.  Patient did dialysis earlier today.  He then returned home.  He normally uses a walker or wheelchair, but decided to attempt going to the bathroom without assistance.  In the bathroom, when he was trying to pull up his pants, he lost balance and fell over.  He denies any pain or injury from the fall.  Denies hitting his head.  No loss of consciousness.  Patient is asymptomatic here in the emergency department.  ED workup with no acute abnormalities.  Patient discharged home in stable condition with outpatient follow-up and ER return precautions    Amount and/or Complexity of Data Reviewed  External Data Reviewed: notes.     Details: Past medical history, medications, allergies reviewed  Labs: ordered. Decision-making details documented in ED Course.  Radiology: ordered and independent interpretation performed. Decision-making details documented in ED Course.  ECG/medicine tests: ordered and independent interpretation  performed. Decision-making details documented in ED Course.    Risk  OTC drugs.  Prescription drug management.                ED Medication(s):  Medications - No data to display    New Prescriptions    No medications on file        Follow-up Information       Call  Vasu Kong MD.    Specialty: Internal Medicine  Contact information:  [address] [facility] [address] [phone]               [facility] - Emergency Dept..    Specialty: Emergency Medicine  Why: As needed, If symptoms worsen  Contact information:  [address] [facility] [address] [phone]                               Scribe Attestation:   Scribe #1: I performed the above scribed service and the documentation accurately describes the services I performed. I attest to the accuracy of the note.     Attending:   Physician Attestation Statement for Scribe #1: I, Pj Batres MD, personally performed the services described in this documentation, as scribed by Elizabeth Roach, in my presence, and it is both accurate and complete.           Clinical Impression       ICD-10-CM ICD-9-CM   1. Fall, initial encounter  W19.XXXA E888.9   2. Unwitnessed fall  R29.6 [id number]   3. ESRD (end stage renal disease)  N18.6 585.6       Disposition:   Disposition: Discharged  Condition: Stable        Pj Batres MD  02/06/25 6085

## 2025-02-06 NOTE — PROGRESS NOTES
[facility]    2025 8:00 AM    Assessment/Plan:    Patient presents today with therapeutic INR.    Assessment of patient findings and chart review: ER visit yesterday after fall - denies hitting head or loss of consciousness.    Recommendation for patient's warfarin regimen: Continue current maintenance dose    Recommend repeat INR in 1 week  _________________________________________________________________    Radha Abbott (64 y.o.) is followed by the [facility].    Anticoagulation Summary  As of 2025      INR goal:  2.0-3.0   TTR:  69.1% (9.5 mo)   INR used for dosin.0 (2025)   Warfarin maintenance plan:  1.25 mg (2.5 mg x 0.5) every Mon, Wed, Fri; 2.5 mg (2.5 mg x 1) all other days   Weekly warfarin total:  13.75 mg   Plan last modified:  Jesenia Jensen, PharmD (2025)   Next INR check:  2025   Target end date:  --    Indications    LVAD (left ventricular assist device) present [Z95.811]                 Anticoagulation Episode Summary       INR check location:  --    Preferred lab:  --    Send INR reminders to:  [facility] COUM[facility]IN LVAD    Comments:  [place]          Anticoagulation Care Providers       Provider Role Specialty Phone number    Sajan Hurley MD [facility] Cardiology [phone]

## 2025-02-06 NOTE — FIRST PROVIDER EVALUATION
Emergency Department TeleTriage Encounter Note      CHIEF COMPLAINT    Chief Complaint   Patient presents with    Fatigue     States he got weak and fell today after dialysis. Denies injury. He is an LVAD pt.        VITAL SIGNS   Initial Vitals [02/05/25 1752]   BP Pulse Resp Temp SpO2   111/73 91 20 98.3 °F (36.8 °C) 100 %      MAP       --            ALLERGIES    Review of patient's allergies indicates:  No Known Allergies    PROVIDER TRIAGE NOTE  States that he fell today due to generalized weakness, did not hit head or have LOC. Denies any pain at this time. Had dialysis today and was able to complete. Denies chest pain, SOB.     Limited physical exam via telehealth: The patient is awake, alert, answering questions appropriately and is not in respiratory distress.  As the Teletriage provider, I performed an initial assessment and ordered appropriate labs and imaging studies, if any, to facilitate the patient's care once placed in the ED. Once a room is available, care and a full evaluation will be completed by an alternate ED provider.  Any additional orders and the final disposition will be determined by that provider.  All imaging and labs will not be followed-up by the Teletriage Team, including myself.          ORDERS  Labs Reviewed   HEPATITIS C ANTIBODY   HEP C VIRUS HOLD SPECIMEN   HIV 1 / 2 ANTIBODY       ED Orders (720h ago, onward)      Start Ordered     Status Ordering Provider    02/05/25 1818 02/05/25 1817  Troponin I  STAT         Ordered NICANOR VAZQUEZ    02/05/25 1818 02/05/25 1817  Orthostatic vital signs  Once         Ordered NICANOR VAZQUEZ    02/05/25 1817 02/05/25 1816  Saline lock IV  Once         Ordered NICANOR VAZQUEZ    02/05/25 1817 02/05/25 1817  Vital signs  Every 15 min         Ordered NICANOR VAZQUEZ    02/05/25 1817 02/05/25 1817  Cardiac Monitoring - Adult  Continuous        Comments: Notify Physician If:    Ordered NICANOR VAZQUEZ    02/05/25 1817 02/05/25 1817   "Pulse Oximetry Continuous  Continuous         Ordered PANDADAMIKEY NICANOR A.    02/05/25 1817 02/05/25 1817  Diet NPO  Diet effective now         Ordered PANDADAR NICANOR A.    02/05/25 1817 02/05/25 1817  EKG 12-lead  Once        Comments: Do not perform if previously done during this visit/ triage    Ordered DARDAR NICANOR A.    02/05/25 1817 02/05/25 1817  CBC auto differential  STAT         Ordered DARDAR NICANOR A.    02/05/25 1817 02/05/25 1817  Comprehensive metabolic panel  STAT         Ordered DARDAR NICANOR A.    02/05/25 1817 02/05/25 1817  B-Type natriuretic peptide (BNP)  STAT         Ordered PANDADAMIKEY NICANOR A.    02/05/25 1817 02/05/25 1817  X-Ray Chest PA And Lateral  1 time imaging         Ordered DARDAMIKEY NICANOR A.    02/05/25 1755 02/05/25 1755  Hepatitis C Antibody  STAT        Placed in "And" Linked Group    Acknowledged ROWAN BOWIE    02/05/25 1755 02/05/25 1755  HCV Virus Hold Specimen  STAT        Placed in "And" Linked Group    Acknowledged ROWAN BOWIE    02/05/25 1755 02/05/25 1755  HIV 1/2 Ag/Ab (4th Gen)  STAT         Acknowledged ROWAN BOWIE              Virtual Visit Note: The provider triage portion of this emergency department evaluation and documentation was performed via [employer] a HIPAA-compliant telemedicine application, in concert with a tele-presenter in the room. A face to face patient evaluation with one of my colleagues will occur once the patient is placed in an emergency department room.      DISCLAIMER: This note was prepared with [employer] voice recognition transcription software. Garbled syntax, mangled pronouns, and other bizarre constructions may be attributed to that software system.    "

## 2025-02-07 DIAGNOSIS — Z95.811 LVAD (LEFT VENTRICULAR ASSIST DEVICE) PRESENT: ICD-10-CM

## 2025-02-10 RX ORDER — CHOLECALCIFEROL (VITAMIN D3) 25 MCG
1000 TABLET ORAL
Qty: 30 TABLET | Refills: 0 | Status: SHIPPED | OUTPATIENT
Start: 2025-02-10

## 2025-02-11 ENCOUNTER — TELEPHONE (OUTPATIENT)
Dept: TRANSPLANT | Facility: CLINIC | Age: 65
End: 2025-02-11
Payer: MEDICAID

## 2025-02-11 NOTE — TELEPHONE ENCOUNTER
Received page from wife. Patient had 1 LFA when going from lying to sitting in his recliner. He denies symptoms, feels 'OK' and has not had any other alarms. BP was 122/78, Meds are due in 1 hour. Advised to page back if alarms again but does not need to report to the ER at this time for the 1 asymptomatic LFA.

## 2025-02-12 ENCOUNTER — ANTI-COAG VISIT (OUTPATIENT)
Dept: CARDIOLOGY | Facility: CLINIC | Age: 65
End: 2025-02-12
Payer: MEDICAID

## 2025-02-12 ENCOUNTER — LAB VISIT (OUTPATIENT)
Dept: LAB | Facility: HOSPITAL | Age: 65
End: 2025-02-12
Attending: INTERNAL MEDICINE
Payer: MEDICAID

## 2025-02-12 DIAGNOSIS — Z95.811 LVAD (LEFT VENTRICULAR ASSIST DEVICE) PRESENT: Primary | ICD-10-CM

## 2025-02-12 DIAGNOSIS — Z95.811 PRESENCE OF VENTRICULAR ASSIST DEVICE: ICD-10-CM

## 2025-02-12 LAB
INR PPP: 2.4 (ref 0.8–1.2)
PROTHROMBIN TIME: 25.7 SEC (ref 9–12.5)

## 2025-02-12 PROCEDURE — 93793 ANTICOAG MGMT PT WARFARIN: CPT | Mod: ,,,

## 2025-02-12 PROCEDURE — 36415 COLL VENOUS BLD VENIPUNCTURE: CPT | Performed by: INTERNAL MEDICINE

## 2025-02-12 PROCEDURE — 85610 PROTHROMBIN TIME: CPT | Performed by: INTERNAL MEDICINE

## 2025-02-12 NOTE — PROGRESS NOTES
Pt wife advised on continuing current maintenance plan. Pt wife requesting for INR to be done at [facility] due to not being able to make appt at [facility].

## 2025-02-12 NOTE — PROGRESS NOTES
[facility]    2025 11:59 AM    Assessment/Plan:    Patient presents today with therapeutic INR.    Assessment of patient findings and chart review: no significant findings     Recommendation for patient's warfarin regimen: Continue current maintenance dose    Recommend repeat INR in 1 week  _________________________________________________________________    Radha Abbott (64 y.o.) is followed by the [facility].    Anticoagulation Summary  As of 2025      INR goal:  2.0-3.0   TTR:  69.8% (9.7 mo)   INR used for dosin.4 (2025)   Warfarin maintenance plan:  1.25 mg (2.5 mg x 0.5) every Mon, Wed, Fri; 2.5 mg (2.5 mg x 1) all other days   Weekly warfarin total:  13.75 mg   Plan last modified:  Jesenia Jensen, PharmD (2025)   Next INR check:  2025   Target end date:  --    Indications    LVAD (left ventricular assist device) present [Z95.811]                 Anticoagulation Episode Summary       INR check location:  --    Preferred lab:  --    Send INR reminders to:  [facility] COUMADIN LVAD    Comments:  [place]          Anticoagulation Care Providers       Provider Role Specialty Phone number    Sajan Hurley MD [facility] Cardiology [phone]

## 2025-02-18 NOTE — ASSESSMENT & PLAN NOTE
Continue DAPT   Pelvic ultrasound normal post hysterectomy and oophorectomy  without abnormality noted

## 2025-02-19 ENCOUNTER — TELEPHONE (OUTPATIENT)
Dept: TRANSPLANT | Facility: CLINIC | Age: 65
End: 2025-02-19
Payer: MEDICAID

## 2025-02-19 NOTE — TELEPHONE ENCOUNTER
Paged by nurse at dialysis center. Patient had one short low flow alarm. Automatic cuff pressure at that time was 75/50, non-pulsatile. Requested they obtain a doppler pressure. Advised they stop the ultrafiltration, states they already did. Advised that if bp does not improve they may need to stop session. If bp does not improve once session is complete please page back for further instructions. Verbalized understanding.

## 2025-02-20 ENCOUNTER — ANTI-COAG VISIT (OUTPATIENT)
Dept: CARDIOLOGY | Facility: CLINIC | Age: 65
End: 2025-02-20
Payer: MEDICAID

## 2025-02-20 ENCOUNTER — LAB VISIT (OUTPATIENT)
Dept: LAB | Facility: HOSPITAL | Age: 65
End: 2025-02-20
Attending: INTERNAL MEDICINE
Payer: MEDICAID

## 2025-02-20 DIAGNOSIS — Z95.811 PRESENCE OF VENTRICULAR ASSIST DEVICE: ICD-10-CM

## 2025-02-20 DIAGNOSIS — Z95.811 LVAD (LEFT VENTRICULAR ASSIST DEVICE) PRESENT: Primary | ICD-10-CM

## 2025-02-20 LAB
INR PPP: 1.9 (ref 0.8–1.2)
PROTHROMBIN TIME: 20.3 SEC (ref 9–12.5)

## 2025-02-20 PROCEDURE — 36415 COLL VENOUS BLD VENIPUNCTURE: CPT | Performed by: INTERNAL MEDICINE

## 2025-02-20 PROCEDURE — 85610 PROTHROMBIN TIME: CPT | Performed by: INTERNAL MEDICINE

## 2025-02-20 NOTE — PROGRESS NOTES
[facility]    2025 1:24 PM    Assessment/Plan:    Patient presents today with subtherapeutic  INR.    Assessment of patient findings and chart review: no significant findings     Recommendation for patient's warfarin regimen: Boost dose today to 3.75mg then resume current maintenance dose    Recommend repeat INR in 1 week  _________________________________________________________________    Radha Abbott (64 y.o.) is followed by the [facility].    Anticoagulation Summary  As of 2025      INR goal:  2.0-3.0   TTR:  70.1% (10 mo)   INR used for dosin.9 (2025)   Warfarin maintenance plan:  1.25 mg (2.5 mg x 0.5) every Mon, Wed, Fri; 2.5 mg (2.5 mg x 1) all other days   Weekly warfarin total:  13.75 mg   Plan last modified:  Jesenia Jenesn, PharmD (2025)   Next INR check:  2025   Target end date:  --    Indications    LVAD (left ventricular assist device) present [Z95.811]                 Anticoagulation Episode Summary       INR check location:  --    Preferred lab:  --    [facility]    Comments:  [place]          Anticoagulation Care Providers       Provider Role Specialty Phone number    Sajan Hurley MD [facility] Cardiology [phone]

## 2025-02-24 NOTE — PROGRESS NOTES
[facility] - Cardiac Intensive Care  Heart Transplant  Progress Note    Patient Name: Radha Abbott  MRN: [id number]  Admission Date: 6/11/2024  Hospital Length of Stay: 2 days  Attending Physician: Sajan Hurley, *  Primary Care Provider: Vasu Kong MD  Principal Problem:Acute renal failure superimposed on stage 4 chronic kidney disease    Subjective:   Interval History: Had 4 hour run of SLED yesterday, CVP 20, UOP past 24 hours 550 cc. Much more alert and feels 100% better. Plan another run of SLED or SCUF today. No further hypoglycemia - weaning D50w gtt.     Continuous Infusions:   sodium chloride 0.9%   Intravenous Continuous   Stopped at 06/12/24 2012    sodium chloride 0.9%   Intravenous Continuous   Stopped at 06/13/24 0117    bumetanide (BUMEX) 25 mg in 100 mL infusion (conc: 0.25 mg/mL)  1 mg/hr Intravenous Continuous 4 mL/hr at 06/13/24 1001 1 mg/hr at 06/13/24 1001    dextrose 10 % in water (D10W)   Intravenous Continuous 50 mL/hr at 06/13/24 1001 Rate Verify at 06/13/24 1001     Scheduled Meds:   amiodarone  400 mg Oral Daily    amoxicillin  500 mg Oral Q12H    atorvastatin  40 mg Oral QHS    folic acid  1 mg Oral Daily    hydrALAZINE  100 mg Oral Q8H    pantoprazole  40 mg Oral Daily    venlafaxine  37.5 mg Oral Daily    vitamin D  1,000 Units Oral Daily    warfarin  2.5 mg Oral Daily     PRN Meds:  Current Facility-Administered Medications:     acetaminophen, 650 mg, Oral, Q6H PRN    dextrose 10%, 12.5 g, Intravenous, PRN    dextrose 10%, 12.5 g, Intravenous, PRN    dextrose 10%, 25 g, Intravenous, PRN    dextrose 10%, 25 g, Intravenous, PRN    glucagon (human recombinant), 1 mg, Intramuscular, PRN    glucagon (human recombinant), 1 mg, Intramuscular, PRN    glucose, 16 g, Oral, PRN    glucose, 16 g, Oral, PRN    glucose, 24 g, Oral, PRN    glucose, 24 g, Oral, PRN    magnesium sulfate IVPB, 2 g, Intravenous, PRN    traZODone, 25 mg, Oral, Nightly PRN    Review of patient's allergies  indicates:  No Known Allergies  Objective:     Vital Signs (Most Recent):  Temp: 97.6 °F (36.4 °C) (06/13/24 0701)  Pulse: 75 (06/13/24 1001)  Resp: 17 (06/13/24 1001)  BP: 110/66 (06/13/24 1001)  SpO2: 100 % (06/13/24 1001) Vital Signs (24h Range):  Temp:  [97.5 °F (36.4 °C)-97.9 °F (36.6 °C)] 97.6 °F (36.4 °C)  Pulse:  [] 75  Resp:  [15-22] 17  SpO2:  [92 %-100 %] 100 %  BP: ()/(0-81) 110/66  Arterial Line BP: ()/(71-98) 93/71     Patient Vitals for the past 72 hrs (Last 3 readings):   Weight   06/13/24 0400 80 kg (176 lb 5.9 oz)   06/12/24 0401 80.5 kg (177 lb 7.5 oz)   06/11/24 1549 81.2 kg (179 lb)     Body mass index is 23.27 kg/m².      Intake/Output Summary (Last 24 hours) at 6/13/2024 1017  Last data filed at 6/13/2024 1001  Gross per 24 hour   Intake 2764.45 ml   Output 1478 ml   Net 1286.45 ml       Hemodynamic Parameters:       Telemetry: SR       Physical Exam  Constitutional:       Comments: Much more alert. Oriented X 3   HENT:      Head: Normocephalic and atraumatic.   Eyes:      Conjunctiva/sclera: Conjunctivae normal.      Pupils: Pupils are equal, round, and reactive to light.   Neck:      Comments: RIJ trialysis cath. JVP at earlobe  Cardiovascular:      Rate and Rhythm: Normal rate and regular rhythm.      Comments: Smooth VAD hum  Pulmonary:      Effort: Pulmonary effort is normal.      Breath sounds: Normal breath sounds.   Abdominal:      General: Bowel sounds are normal.      Palpations: Abdomen is soft.   Musculoskeletal:         General: No swelling. Normal range of motion.      Cervical back: Normal range of motion and neck supple.   Skin:     General: Skin is warm and dry.      Capillary Refill: Capillary refill takes 2 to 3 seconds.   Neurological:      Comments: Much more alert. Oriented X 3            Significant Labs:  CBC:  Recent Labs   Lab 06/12/24  0345 06/12/24  2120 06/13/24  0349   WBC 6.63 10.80 9.06   RBC 2.26* 2.24* 2.27*   HGB 6.3* 6.5* 6.5*   HCT 20.5*  20.2* 20.4*    357 298   MCV 91 90 90   MCH 27.9 29.0 28.6   MCHC 30.7* 32.2 31.9*     BNP:  Recent Labs   Lab 06/11/24 1933   BNP 1,388*     CMP:  Recent Labs   Lab 06/11/24  1605 06/11/24  2227 06/12/24 0345 06/12/24  1422 06/12/24  1642 06/12/24 2120 06/13/24 0349   GLU 84   < > 32* 81 174* 151* 148*  148*   CALCIUM 8.7   < > 8.9 8.9 8.6* 8.7 8.9  8.9   ALBUMIN 2.6*  --  2.5* 2.5*  --  2.5* 2.4*   PROT 7.6  --  7.5  --   --   --   --       < > 136 134* 130* 129* 127*  127*   K 4.1   < > 3.9 4.1 4.0 4.2 4.2  4.2   CO2 20*   < > 20* 22* 20* 17* 19*  19*   CL 99   < > 99 96 97 99 95  95   *   < > 114* 112* 98* 69* 75*  75*   CREATININE 9.9*   < > 9.5* 9.4* 8.3* 6.1* 6.4*  6.4*   ALKPHOS 142*  --  148*  --   --   --   --    ALT 59*  --  67*  --   --   --   --    AST 58*  --  73*  --   --   --   --    BILITOT 0.2  --  0.2  --   --   --   --     < > = values in this interval not displayed.      Coagulation:   Recent Labs   Lab 06/11/24 1933 06/12/24 0345 06/13/24 0349   INR 2.4* 2.4* 2.8*   APTT 46.5* 45.2* 48.4*     LDH:  Recent Labs   Lab 06/12/24 0345 06/13/24 0349   * 422*     Microbiology:  Microbiology Results (last 7 days)       Procedure Component Value Units Date/Time    Blood culture #2 **CANNOT BE ORDERED STAT** [[id number]] Collected: 06/11/24 2121    Order Status: Completed Specimen: Blood from Peripheral, Hand, Left Updated: 06/12/24 2213     Blood Culture, Routine No Growth to date      No Growth to date    Blood culture #1 **CANNOT BE ORDERED STAT** [[id number]] Collected: 06/11/24 2122    Order Status: Completed Specimen: Blood from Peripheral, Antecubital, Right Updated: 06/12/24 2213     Blood Culture, Routine No Growth to date      No Growth to date    Urine culture [[id number]] Collected: 06/12/24 1422    Order Status: No result Specimen: Urine Updated: 06/12/24 1541            I have reviewed all pertinent labs within the past 24 hours.    Estimated  Therapy Creatinine Clearance: 13.5 mL/min (A) (based on SCr of 6.4 mg/dL (H)).    Diagnostic Results:  I have reviewed and interpreted all pertinent imaging results/findings within the past 24 hours.  Assessment and Plan:     Mr. Radha Abbott is a 63 yo male with stage D HFrEF, ICMP who underwent HM3 placement as DT (12/1/23) w/ a hospital stay complicated by vasoplegia(requiring Giaprezza), debility, malnutrition/impaired swallowing, and renal failure requiring HD (since weaned off) w/ recent discharge for ADHF presented to the ED with wife due to c/o worsening generalized weakness since 6/10. Per wife patient was mostly sleeping during the day despite sleeping 10 hrs at night. Patient also admits decreased appetite and decreased urine output despite taking his medications as prescribed. He has not urinated since 6/11 at 2pm. He has chronic shortness of breath on exertion. Denies confusion, syncope, diarrhea, constipation, N/V, dysuria, or other complaints. Patient had his prior tunnel cath removed used for HD about 2 weeks ago. He completed a 6 week therapy for Enterococcus bacteremia with ampicillin and rocephin ending 5/30/24a and was switched to PO antibiotics.     On prior hospital stay nephrology consulted during hospital stay due to elevated renal function. They feel he is close to being a dialysis patient. He diuresed on IV Bumex with prn Metolazone. He is net negative 1.2L throughout his hospital stay and weight on day of discharge was 179lbs down from 186lbs on admit. We continued his home dose of Bumex 4mg BID but increased his prn Metolazone to 10mg. He received a dose of Epo while inpatient and he has plans to follow up with Heme/Onc to get outpatient therapy arranged.      2D Echo with CFD done on 3/26/2024  LVAD: There is a Heartmate III LVAD running at 5000 RPM. The aortic valve does not open. The ventricular septum is at midline. Inflow cannula well seated at the apex. Outflow graft not visualized.   Left  Ventricle: The left ventricle is moderately dilated. Normal wall thickness. Global hypokinesis present. Septal motion is abnormal. There is severely reduced systolic function with a visually estimated ejection fraction of 5 - 10%. There is diastolic dysfunction but grade cannot be determined.   Right Ventricle: Mild right ventricular enlargement. Wall thickness is normal. Right ventricle wall motion is normal. Systolic function is normal. Pacemaker lead present in the ventricle.   Left Atrium: Left atrium is severely dilated.   Right Atrium: Right atrium is moderately dilated.   Mitral Valve: The mitral valve is repaired with an Noble stitch. There is mild regurgitation.   IVC/SVC: Normal venous pressure at 3 mmHg.              * Acute renal failure superimposed on stage 4 chronic kidney disease  Mr. Radha bAbott is a 65 yo male with stage D HFrEF, ICMP who underwent HM3 placement as DT, DM2, CKD4, HTN, E.faecalis bacteremia who presented due to c/o worsening generalized weakness since 6/10. Admitted for worsening renal function with a BUN>100/Cr. 9.9.     Plan:   - CT Ab/pel: Kidneys/ Ureters: Normal in size and location.  Mild bilateral perinephric stranding, a nonspecific finding and similar to prior.  No hydronephrosis or nephrolithiasis. No ureteral dilatation.  - Nephrology consulted, appreciate recs. To start intermittent HD today ASAP  - [place] cath in place for accurate I&O's  - RIJ trialysis placed for hemodynamics monitoring and HD  - Renally dosing medications      Hypoglycemia  -Appreciate Endocrine's help  -Titrate D10W to keep glucose > 80    Bacteremia due to Enterococcus  He completed a 6 week therapy for Enterococcus bacteremia with ampicillin and rocephin ending 5/30/24a and was switched to Amoxicillin for suppression    Anticoagulant long-term use  Cont warfarin    Anemia  Chronic with no acute bleeding, multifactorial 2/2 CKD4 and chronic anticoagulation    -Hgb 6.3, normally in the 6's -  7's  -Iron panel done. Iron deficient but Ferritin is elevated at 1186  -Continue to trend    Adjustment disorder with depressed mood  Cont SSRI    LVAD (left ventricular assist device) present  Procedure: Device Interrogation Including analysis of device parameters  Current Settings: Ventricular Assist Device  Review of device function is stable        6/12/2024     5:01 AM 6/12/2024     4:01 AM 6/12/2024     3:01 AM 6/12/2024     2:01 AM 6/12/2024     1:00 AM 6/12/2024    12:10 AM 6/11/2024     7:35 PM   TXP LVAD INTERROGATIONS   Type HeartMate3 HeartMate3 HeartMate3 HeartMate3 HeartMate3 HeartMate3 HeartMate3   Flow 4.1 4.2 4 4 4.1 4.4 4.6   Speed 5000 5000 5000 5000 5000 5000 5000   PI 4.8 4.2 5.3 6.3 5.6 3.7 2.8   Power ([place]) 3.4 3.4 3.3 3.4 3.4 3.4 3.5   Pulsatility Intermittent pulse Intermittent pulse Intermittent pulse Intermittent pulse   Intermittent pulse         PAF (paroxysmal atrial fibrillation)  -Continue amiodarone and Coumadin      Type 2 diabetes mellitus without complication, without long-term current use of insulin  Will hold off antiglycemic meds due to hypoglycemia    Acute on chronic combined systolic and diastolic heart failure  Owing to his advanced kidney disease and significantly elevated creat, he is not on a  ACEI/ARB/ARNI or MRA. He is also not on a beta blocker due to RV dysfunction.     Plan:  - CVP 23. Bumex 4 mg IVP bid changed to Bumex 2 mg/hr with increase in UOP from ~ 25 cc/hr to 50 cc/hr  - will monitor hemodynamics    CAD (coronary artery disease)  -Continue statin      Uninterrupted Critical Care/Counseling Time (not including procedures): [place] [id number]  Heart Transplant  [facility] - Cardiac Int[facility]

## 2025-02-27 ENCOUNTER — ANTI-COAG VISIT (OUTPATIENT)
Dept: CARDIOLOGY | Facility: CLINIC | Age: 65
End: 2025-02-27
Payer: MEDICAID

## 2025-02-27 ENCOUNTER — TELEPHONE (OUTPATIENT)
Dept: TRANSPLANT | Facility: CLINIC | Age: 65
End: 2025-02-27
Payer: MEDICAID

## 2025-02-27 ENCOUNTER — LAB VISIT (OUTPATIENT)
Dept: LAB | Facility: HOSPITAL | Age: 65
End: 2025-02-27
Attending: INTERNAL MEDICINE
Payer: MEDICAID

## 2025-02-27 DIAGNOSIS — Z95.811 PRESENCE OF VENTRICULAR ASSIST DEVICE: ICD-10-CM

## 2025-02-27 DIAGNOSIS — Z95.811 LVAD (LEFT VENTRICULAR ASSIST DEVICE) PRESENT: Primary | ICD-10-CM

## 2025-02-27 LAB
ALBUMIN SERPL BCP-MCNC: 2.9 G/DL (ref 3.5–5.2)
ALP SERPL-CCNC: 123 U/L (ref 40–150)
ALT SERPL W/O P-5'-P-CCNC: 63 U/L (ref 10–44)
ANION GAP SERPL CALC-SCNC: 12 MMOL/L (ref 8–16)
AST SERPL-CCNC: 51 U/L (ref 10–40)
BASOPHILS # BLD AUTO: 0.05 K/UL (ref 0–0.2)
BASOPHILS NFR BLD: 0.8 % (ref 0–1.9)
BILIRUB SERPL-MCNC: 0.5 MG/DL (ref 0.1–1)
BNP SERPL-MCNC: 130 PG/ML (ref 0–99)
BUN SERPL-MCNC: 31 MG/DL (ref 8–23)
CALCIUM SERPL-MCNC: 9.4 MG/DL (ref 8.7–10.5)
CHLORIDE SERPL-SCNC: 99 MMOL/L (ref 95–110)
CO2 SERPL-SCNC: 25 MMOL/L (ref 23–29)
CREAT SERPL-MCNC: 7.6 MG/DL (ref 0.5–1.4)
CRP SERPL-MCNC: 58.7 MG/L (ref 0–8.2)
DIFFERENTIAL METHOD BLD: ABNORMAL
EOSINOPHIL # BLD AUTO: 0.2 K/UL (ref 0–0.5)
EOSINOPHIL NFR BLD: 2.7 % (ref 0–8)
ERYTHROCYTE [DISTWIDTH] IN BLOOD BY AUTOMATED COUNT: 17.1 % (ref 11.5–14.5)
EST. GFR  (NO RACE VARIABLE): 7 ML/MIN/1.73 M^2
GLUCOSE SERPL-MCNC: 141 MG/DL (ref 70–110)
HCT VFR BLD AUTO: 38.3 % (ref 40–54)
HGB BLD-MCNC: 11.4 G/DL (ref 14–18)
IMM GRANULOCYTES # BLD AUTO: 0.04 K/UL (ref 0–0.04)
IMM GRANULOCYTES NFR BLD AUTO: 0.6 % (ref 0–0.5)
INR PPP: 2.7 (ref 0.8–1.2)
LYMPHOCYTES # BLD AUTO: 0.8 K/UL (ref 1–4.8)
LYMPHOCYTES NFR BLD: 12.5 % (ref 18–48)
MCH RBC QN AUTO: 27 PG (ref 27–31)
MCHC RBC AUTO-ENTMCNC: 29.8 G/DL (ref 32–36)
MCV RBC AUTO: 91 FL (ref 82–98)
MONOCYTES # BLD AUTO: 0.3 K/UL (ref 0.3–1)
MONOCYTES NFR BLD: 5.3 % (ref 4–15)
NEUTROPHILS # BLD AUTO: 4.9 K/UL (ref 1.8–7.7)
NEUTROPHILS NFR BLD: 78.1 % (ref 38–73)
NRBC BLD-RTO: 0 /100 WBC
PLATELET # BLD AUTO: 282 K/UL (ref 150–450)
PMV BLD AUTO: 9 FL (ref 9.2–12.9)
POTASSIUM SERPL-SCNC: 5 MMOL/L (ref 3.5–5.1)
PROT SERPL-MCNC: 8.6 G/DL (ref 6–8.4)
PROTHROMBIN TIME: 28.5 SEC (ref 9–12.5)
RBC # BLD AUTO: 4.22 M/UL (ref 4.6–6.2)
SODIUM SERPL-SCNC: 136 MMOL/L (ref 136–145)
WBC # BLD AUTO: 6.23 K/UL (ref 3.9–12.7)

## 2025-02-27 PROCEDURE — 93793 ANTICOAG MGMT PT WARFARIN: CPT | Mod: ,,,

## 2025-02-27 PROCEDURE — 85610 PROTHROMBIN TIME: CPT | Performed by: INTERNAL MEDICINE

## 2025-02-27 PROCEDURE — 86140 C-REACTIVE PROTEIN: CPT | Performed by: INTERNAL MEDICINE

## 2025-02-27 PROCEDURE — 83880 ASSAY OF NATRIURETIC PEPTIDE: CPT | Performed by: INTERNAL MEDICINE

## 2025-02-27 PROCEDURE — 80053 COMPREHEN METABOLIC PANEL: CPT | Performed by: INTERNAL MEDICINE

## 2025-02-27 PROCEDURE — 85025 COMPLETE CBC W/AUTO DIFF WBC: CPT | Performed by: INTERNAL MEDICINE

## 2025-02-27 PROCEDURE — 36415 COLL VENOUS BLD VENIPUNCTURE: CPT | Performed by: INTERNAL MEDICINE

## 2025-02-27 NOTE — PROGRESS NOTES
[facility]    2025 11:45 AM    Assessment/Plan:    Patient presents today with therapeutic INR.    Assessment of patient findings and chart review: no significant findings     Recommendation for patient's warfarin regimen: Continue current maintenance dose    Recommend repeat INR in 1 week  _________________________________________________________________    Radha Abbott (64 y.o.) is followed by the [facility].    Anticoagulation Summary  As of 2025      INR goal:  2.0-3.0   TTR:  70.5% (10.2 mo)   INR used for dosin.7 (2025)   Warfarin maintenance plan:  1.25 mg (2.5 mg x 0.5) every Mon, Wed, Fri; 2.5 mg (2.5 mg x 1) all other days   Weekly warfarin total:  13.75 mg   Plan last modified:  Jesenia Jensen, PharmD (2025)   Next INR check:  3/6/2025   Target end date:  --    Indications    LVAD (left ventricular assist device) present [Z95.811]                 Anticoagulation Episode Summary       INR check location:  --    Preferred lab:  --    Send INR reminders to:  [facility] COUMADIN LVAD    Comments:  [place]          Anticoagulation Care Providers       Provider Role Specialty Phone number    Sajan Hurley MD [facility] Cardiology [phone]

## 2025-02-27 NOTE — TELEPHONE ENCOUNTER
Patient's wife called reporting patient was being prescribed lyrica and wanted to verify that it was safe to take. Provided permission to take. Wife also requested an appetite stimulant. Bev has had a lack of appetite x1 week, denies N/V/D. Endorses slight nasal drainage. Dialysis was held Friday d/t BP and only 0.7 was pulled yesterday. Labs set up to evaluate.

## 2025-02-27 NOTE — PROGRESS NOTES
Wife verified correct warfarin dose, reports low appetite--notified LVAD, 2/25 started Doxycycline 100mg once a day for 7 days, also prescribed generic Lyrica 100mg--has not started it

## 2025-03-06 ENCOUNTER — ANTI-COAG VISIT (OUTPATIENT)
Dept: CARDIOLOGY | Facility: CLINIC | Age: 65
End: 2025-03-06
Payer: MEDICAID

## 2025-03-06 ENCOUNTER — LAB VISIT (OUTPATIENT)
Dept: LAB | Facility: HOSPITAL | Age: 65
End: 2025-03-06
Attending: INTERNAL MEDICINE
Payer: MEDICAID

## 2025-03-06 DIAGNOSIS — Z95.811 PRESENCE OF VENTRICULAR ASSIST DEVICE: ICD-10-CM

## 2025-03-06 DIAGNOSIS — Z95.811 LVAD (LEFT VENTRICULAR ASSIST DEVICE) PRESENT: Primary | ICD-10-CM

## 2025-03-06 LAB
INR PPP: 2.9 (ref 0.8–1.2)
PROTHROMBIN TIME: 30.3 SEC (ref 9–12.5)

## 2025-03-06 PROCEDURE — 36415 COLL VENOUS BLD VENIPUNCTURE: CPT | Performed by: INTERNAL MEDICINE

## 2025-03-06 PROCEDURE — 85610 PROTHROMBIN TIME: CPT | Performed by: INTERNAL MEDICINE

## 2025-03-06 PROCEDURE — 93793 ANTICOAG MGMT PT WARFARIN: CPT | Mod: ,,,

## 2025-03-06 NOTE — PROGRESS NOTES
[facility]    03/06/2025    Radha Abbott (64 y.o.) is followed by the [facility].      Assessment/Plan:    Radha Abbott presents with a therapeutic INR. Goal INR: 2.0-3.0    Lab Results   Component Value Date    INR 2.9 (H) 03/06/2025    INR 2.7 (H) 02/27/2025    INR 1.9 (H) 02/20/2025       Assessment of patient findings per MA/LPN and chart review:   The following significant findings were found:   none    Recommendation for patient's warfarin regimen:   No change was made to warfarin therapy during this visit and patient has been instructed to continue their current warfarin regimen.    Recommended repeat INR in 1 week      Joycelyn Angel PharmD, BCPS  Clinical Pharmacist - [facility]  Preferred Contact: Secure Messaging or In Basket Message

## 2025-03-09 ENCOUNTER — HOSPITAL ENCOUNTER (EMERGENCY)
Facility: HOSPITAL | Age: 65
Discharge: OTHER INSTITUTION | End: 2025-03-10
Attending: EMERGENCY MEDICINE
Payer: MEDICAID

## 2025-03-09 DIAGNOSIS — I50.9 HEART FAILURE, UNSPECIFIED HF CHRONICITY, UNSPECIFIED HEART FAILURE TYPE: Primary | ICD-10-CM

## 2025-03-09 DIAGNOSIS — Z99.2 ESRD (END STAGE RENAL DISEASE) ON DIALYSIS: ICD-10-CM

## 2025-03-09 DIAGNOSIS — N18.6 ESRD (END STAGE RENAL DISEASE) ON DIALYSIS: ICD-10-CM

## 2025-03-09 DIAGNOSIS — R53.1 WEAKNESS: ICD-10-CM

## 2025-03-09 DIAGNOSIS — Z95.811 LVAD (LEFT VENTRICULAR ASSIST DEVICE) PRESENT: ICD-10-CM

## 2025-03-09 DIAGNOSIS — S00.03XA CONTUSION OF SCALP, INITIAL ENCOUNTER: ICD-10-CM

## 2025-03-09 DIAGNOSIS — R79.89 ELEVATED TROPONIN: ICD-10-CM
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
## 2025-03-09 DIAGNOSIS — Z92.29 HISTORY OF WARFARIN THERAPY: ICD-10-CM

## 2025-03-09 DIAGNOSIS — R07.9 CHEST PAIN: ICD-10-CM

## 2025-03-09 DIAGNOSIS — R79.89 ELEVATED BRAIN NATRIURETIC PEPTIDE (BNP) LEVEL: ICD-10-CM

## 2025-03-09 LAB
ALBUMIN SERPL BCP-MCNC: 2.8 G/DL (ref 3.5–5.2)
ALP SERPL-CCNC: 161 U/L (ref 40–150)
ALT SERPL W/O P-5'-P-CCNC: 199 U/L (ref 10–44)
ANION GAP SERPL CALC-SCNC: 15 MMOL/L (ref 8–16)
ANISOCYTOSIS BLD QL SMEAR: SLIGHT
APTT PPP: 35.7 SEC (ref 21–32)
AST SERPL-CCNC: 163 U/L (ref 10–40)
BASOPHILS # BLD AUTO: 0.03 K/UL (ref 0–0.2)
BASOPHILS NFR BLD: 0.4 % (ref 0–1.9)
BILIRUB SERPL-MCNC: 0.6 MG/DL (ref 0.1–1)
BNP SERPL-MCNC: 888 PG/ML (ref 0–99)
BUN SERPL-MCNC: 52 MG/DL (ref 8–23)
CALCIUM SERPL-MCNC: 8.9 MG/DL (ref 8.7–10.5)
CHLORIDE SERPL-SCNC: 97 MMOL/L (ref 95–110)
CO2 SERPL-SCNC: 19 MMOL/L (ref 23–29)
CREAT SERPL-MCNC: 8.5 MG/DL (ref 0.5–1.4)
DIFFERENTIAL METHOD BLD: ABNORMAL
EOSINOPHIL # BLD AUTO: 0.1 K/UL (ref 0–0.5)
EOSINOPHIL NFR BLD: 1 % (ref 0–8)
ERYTHROCYTE [DISTWIDTH] IN BLOOD BY AUTOMATED COUNT: 17 % (ref 11.5–14.5)
EST. GFR  (NO RACE VARIABLE): 7 ML/MIN/1.73 M^2
GIANT PLATELETS BLD QL SMEAR: PRESENT
GLUCOSE SERPL-MCNC: 146 MG/DL (ref 70–110)
HCT VFR BLD AUTO: 37.3 % (ref 40–54)
HGB BLD-MCNC: 11.3 G/DL (ref 14–18)
IMM GRANULOCYTES # BLD AUTO: 0.08 K/UL (ref 0–0.04)
IMM GRANULOCYTES NFR BLD AUTO: 1 % (ref 0–0.5)
INFLUENZA A, MOLECULAR: NEGATIVE
INFLUENZA B, MOLECULAR: NEGATIVE
INR PPP: 2.6 (ref 0.8–1.2)
LYMPHOCYTES # BLD AUTO: 0.6 K/UL (ref 1–4.8)
LYMPHOCYTES NFR BLD: 6.9 % (ref 18–48)
MCH RBC QN AUTO: 26.4 PG (ref 27–31)
MCHC RBC AUTO-ENTMCNC: 30.3 G/DL (ref 32–36)
MCV RBC AUTO: 87 FL (ref 82–98)
MONOCYTES # BLD AUTO: 0.4 K/UL (ref 0.3–1)
MONOCYTES NFR BLD: 4.8 % (ref 4–15)
NEUTROPHILS # BLD AUTO: 6.9 K/UL (ref 1.8–7.7)
NEUTROPHILS NFR BLD: 85.9 % (ref 38–73)
NRBC BLD-RTO: 0 /100 WBC
OVALOCYTES BLD QL SMEAR: ABNORMAL
PLATELET # BLD AUTO: 189 K/UL (ref 150–450)
PLATELET BLD QL SMEAR: ABNORMAL
PMV BLD AUTO: 11 FL (ref 9.2–12.9)
POCT GLUCOSE: 116 MG/DL (ref 70–110)
POCT GLUCOSE: 156 MG/DL (ref 70–110)
POIKILOCYTOSIS BLD QL SMEAR: SLIGHT
POTASSIUM SERPL-SCNC: 5 MMOL/L (ref 3.5–5.1)
PROT SERPL-MCNC: 8 G/DL (ref 6–8.4)
PROTHROMBIN TIME: 27.7 SEC (ref 9–12.5)
RBC # BLD AUTO: 4.28 M/UL (ref 4.6–6.2)
SARS-COV-2 RDRP RESP QL NAA+PROBE: NEGATIVE
SODIUM SERPL-SCNC: 131 MMOL/L (ref 136–145)
SPECIMEN SOURCE: NORMAL
TROPONIN I SERPL DL<=0.01 NG/ML-MCNC: 0.09 NG/ML (ref 0–0.03)
TROPONIN I SERPL DL<=0.01 NG/ML-MCNC: 0.1 NG/ML (ref 0–0.03)
WBC # BLD AUTO: 8.07 K/UL (ref 3.9–12.7)

## 2025-03-09 PROCEDURE — 83880 ASSAY OF NATRIURETIC PEPTIDE: CPT | Performed by: EMERGENCY MEDICINE

## 2025-03-09 PROCEDURE — 93010 ELECTROCARDIOGRAM REPORT: CPT | Mod: ,,, | Performed by: INTERNAL MEDICINE

## 2025-03-09 PROCEDURE — 85730 THROMBOPLASTIN TIME PARTIAL: CPT | Performed by: EMERGENCY MEDICINE

## 2025-03-09 PROCEDURE — 85025 COMPLETE CBC W/AUTO DIFF WBC: CPT | Performed by: EMERGENCY MEDICINE

## 2025-03-09 PROCEDURE — 80053 COMPREHEN METABOLIC PANEL: CPT | Performed by: EMERGENCY MEDICINE

## 2025-03-09 PROCEDURE — 93005 ELECTROCARDIOGRAM TRACING: CPT

## 2025-03-09 PROCEDURE — 87502 INFLUENZA DNA AMP PROBE: CPT | Performed by: EMERGENCY MEDICINE

## 2025-03-09 PROCEDURE — 84484 ASSAY OF TROPONIN QUANT: CPT | Performed by: EMERGENCY MEDICINE

## 2025-03-09 PROCEDURE — 85610 PROTHROMBIN TIME: CPT | Performed by: EMERGENCY MEDICINE

## 2025-03-09 PROCEDURE — 99285 EMERGENCY DEPT VISIT HI MDM: CPT | Mod: 25

## 2025-03-09 PROCEDURE — 82962 GLUCOSE BLOOD TEST: CPT

## 2025-03-09 PROCEDURE — 87635 SARS-COV-2 COVID-19 AMP PRB: CPT | Performed by: EMERGENCY MEDICINE

## 2025-03-09 RX ORDER — PREGABALIN 100 MG/1
1 CAPSULE ORAL EVERY OTHER DAY
COMMUNITY
Start: 2025-02-25

## 2025-03-09 RX ORDER — SEVELAMER CARBONATE 800 MG/1
800 TABLET, FILM COATED ORAL
COMMUNITY

## 2025-03-09 RX ORDER — LORATADINE 10 MG/1
10 TABLET ORAL DAILY
Status: ON HOLD | COMMUNITY
End: 2025-03-12 | Stop reason: HOSPADM

## 2025-03-09 RX ORDER — GLUCOSAM/CHONDRO/HERB 149/HYAL 750-100 MG
1 TABLET ORAL DAILY
COMMUNITY

## 2025-03-10 ENCOUNTER — TELEPHONE (OUTPATIENT)
Dept: TRANSPLANT | Facility: CLINIC | Age: 65
End: 2025-03-10
Payer: MEDICAID

## 2025-03-10 ENCOUNTER — HOSPITAL ENCOUNTER (INPATIENT)
Facility: HOSPITAL | Age: 65
LOS: 2 days | Discharge: HOME | DRG: 291 | End: 2025-03-12
Attending: INTERNAL MEDICINE | Admitting: INTERNAL MEDICINE
Payer: MEDICAID

## 2025-03-10 VITALS
OXYGEN SATURATION: 99 % | TEMPERATURE: 99 F | BODY MASS INDEX: 25.73 KG/M2 | HEART RATE: 76 BPM | HEIGHT: 72 IN | DIASTOLIC BLOOD PRESSURE: 83 MMHG | WEIGHT: 190 LBS | SYSTOLIC BLOOD PRESSURE: 104 MMHG | RESPIRATION RATE: 19 BRPM

## 2025-03-10 DIAGNOSIS — R29.6 FALLS: ICD-10-CM

## 2025-03-10 DIAGNOSIS — I50.9 HEART FAILURE: ICD-10-CM

## 2025-03-10 DIAGNOSIS — E44.0 MODERATE PROTEIN-CALORIE MALNUTRITION: ICD-10-CM

## 2025-03-10 DIAGNOSIS — I50.43 ACUTE ON CHRONIC COMBINED SYSTOLIC AND DIASTOLIC CHF, NYHA CLASS 4: ICD-10-CM

## 2025-03-10 DIAGNOSIS — Z95.811 LVAD (LEFT VENTRICULAR ASSIST DEVICE) PRESENT: Primary | ICD-10-CM

## 2025-03-10 DIAGNOSIS — I50.41 ACUTE COMBINED SYSTOLIC AND DIASTOLIC CONGESTIVE HEART FAILURE: ICD-10-CM

## 2025-03-10 DIAGNOSIS — Z95.811 LVAD (LEFT VENTRICULAR ASSIST DEVICE) PRESENT: ICD-10-CM
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
## 2025-03-10 PROBLEM — S00.03XA CONTUSION OF SCALP: Status: ACTIVE | Noted: 2025-03-10

## 2025-03-10 PROBLEM — R79.89 ELEVATED BRAIN NATRIURETIC PEPTIDE (BNP) LEVEL: Status: ACTIVE | Noted: 2025-03-10

## 2025-03-10 PROBLEM — Z92.29: Status: ACTIVE | Noted: 2025-03-10

## 2025-03-10 PROBLEM — R19.5 POSITIVE OCCULT STOOL BLOOD TEST: Status: ACTIVE | Noted: 2025-03-10

## 2025-03-10 PROBLEM — R10.9 LEFT FLANK PAIN: Status: ACTIVE | Noted: 2025-03-10

## 2025-03-10 LAB
ALBUMIN SERPL BCP-MCNC: 2.2 G/DL (ref 3.5–5.2)
ALP SERPL-CCNC: 151 U/L (ref 40–150)
ALT SERPL W/O P-5'-P-CCNC: 199 U/L (ref 10–44)
ANION GAP SERPL CALC-SCNC: 11 MMOL/L (ref 8–16)
AST SERPL-CCNC: 221 U/L (ref 10–40)
BASOPHILS # BLD AUTO: 0.04 K/UL (ref 0–0.2)
BASOPHILS NFR BLD: 0.5 % (ref 0–1.9)
BILIRUB SERPL-MCNC: 0.6 MG/DL (ref 0.1–1)
BNP SERPL-MCNC: 1231 PG/ML (ref 0–99)
BUN SERPL-MCNC: 62 MG/DL (ref 8–23)
CALCIUM SERPL-MCNC: 8.4 MG/DL (ref 8.7–10.5)
CHLORIDE SERPL-SCNC: 99 MMOL/L (ref 95–110)
CO2 SERPL-SCNC: 23 MMOL/L (ref 23–29)
CREAT SERPL-MCNC: 8.2 MG/DL (ref 0.5–1.4)
DIFFERENTIAL METHOD BLD: ABNORMAL
EOSINOPHIL # BLD AUTO: 0.1 K/UL (ref 0–0.5)
EOSINOPHIL NFR BLD: 1.5 % (ref 0–8)
ERYTHROCYTE [DISTWIDTH] IN BLOOD BY AUTOMATED COUNT: 17 % (ref 11.5–14.5)
EST. GFR  (NO RACE VARIABLE): 6.8 ML/MIN/1.73 M^2
ESTIMATED AVG GLUCOSE: 140 MG/DL (ref 68–131)
GLUCOSE SERPL-MCNC: 112 MG/DL (ref 70–110)
GLUCOSE SERPL-MCNC: 98 MG/DL (ref 70–110)
HBA1C MFR BLD: 6.5 % (ref 4–5.6)
HCT VFR BLD AUTO: 34.6 % (ref 40–54)
HGB BLD-MCNC: 10.5 G/DL (ref 14–18)
IMM GRANULOCYTES # BLD AUTO: 0.08 K/UL (ref 0–0.04)
IMM GRANULOCYTES NFR BLD AUTO: 0.9 % (ref 0–0.5)
INR PPP: 3 (ref 0.8–1.2)
LYMPHOCYTES # BLD AUTO: 0.6 K/UL (ref 1–4.8)
LYMPHOCYTES NFR BLD: 6.6 % (ref 18–48)
MAGNESIUM SERPL-MCNC: 1.8 MG/DL (ref 1.6–2.6)
MCH RBC QN AUTO: 26.6 PG (ref 27–31)
MCHC RBC AUTO-ENTMCNC: 30.3 G/DL (ref 32–36)
MCV RBC AUTO: 88 FL (ref 82–98)
MONOCYTES # BLD AUTO: 0.5 K/UL (ref 0.3–1)
MONOCYTES NFR BLD: 5.6 % (ref 4–15)
NEUTROPHILS # BLD AUTO: 7.5 K/UL (ref 1.8–7.7)
NEUTROPHILS NFR BLD: 84.9 % (ref 38–73)
NRBC BLD-RTO: 0 /100 WBC
OB PNL STL: POSITIVE
OHS QRS DURATION: 180 MS
OHS QRS DURATION: 180 MS
OHS QTC CALCULATION: 597 MS
OHS QTC CALCULATION: 625 MS
PLATELET # BLD AUTO: 211 K/UL (ref 150–450)
PMV BLD AUTO: 10.3 FL (ref 9.2–12.9)
POCT GLUCOSE: 140 MG/DL (ref 70–110)
POTASSIUM SERPL-SCNC: 4.9 MMOL/L (ref 3.5–5.1)
PROT SERPL-MCNC: 7 G/DL (ref 6–8.4)
PROTHROMBIN TIME: 30.5 SEC (ref 9–12.5)
RBC # BLD AUTO: 3.94 M/UL (ref 4.6–6.2)
SODIUM SERPL-SCNC: 133 MMOL/L (ref 136–145)
WBC # BLD AUTO: 8.77 K/UL (ref 3.9–12.7)

## 2025-03-10 PROCEDURE — 85610 PROTHROMBIN TIME: CPT | Performed by: STUDENT IN AN ORGANIZED HEALTH CARE EDUCATION/TRAINING PROGRAM

## 2025-03-10 PROCEDURE — 99222 1ST HOSP IP/OBS MODERATE 55: CPT | Mod: ,,, | Performed by: PHYSICIAN ASSISTANT

## 2025-03-10 PROCEDURE — 94761 N-INVAS EAR/PLS OXIMETRY MLT: CPT

## 2025-03-10 PROCEDURE — 80100014 HC HEMODIALYSIS 1:1

## 2025-03-10 PROCEDURE — 5A1D70Z PERFORMANCE OF URINARY FILTRATION, INTERMITTENT, LESS THAN 6 HOURS PER DAY: ICD-10-PCS | Performed by: INTERNAL MEDICINE

## 2025-03-10 PROCEDURE — 93750 INTERROGATION VAD IN PERSON: CPT | Mod: ,,, | Performed by: INTERNAL MEDICINE

## 2025-03-10 PROCEDURE — 80053 COMPREHEN METABOLIC PANEL: CPT | Performed by: STUDENT IN AN ORGANIZED HEALTH CARE EDUCATION/TRAINING PROGRAM

## 2025-03-10 PROCEDURE — 93010 ELECTROCARDIOGRAM REPORT: CPT | Mod: ,,, | Performed by: INTERNAL MEDICINE

## 2025-03-10 PROCEDURE — 83036 HEMOGLOBIN GLYCOSYLATED A1C: CPT | Performed by: PHYSICIAN ASSISTANT

## 2025-03-10 PROCEDURE — 25000003 PHARM REV CODE 250: Performed by: STUDENT IN AN ORGANIZED HEALTH CARE EDUCATION/TRAINING PROGRAM

## 2025-03-10 PROCEDURE — 83735 ASSAY OF MAGNESIUM: CPT | Performed by: STUDENT IN AN ORGANIZED HEALTH CARE EDUCATION/TRAINING PROGRAM

## 2025-03-10 PROCEDURE — 93005 ELECTROCARDIOGRAM TRACING: CPT

## 2025-03-10 PROCEDURE — 83880 ASSAY OF NATRIURETIC PEPTIDE: CPT | Performed by: STUDENT IN AN ORGANIZED HEALTH CARE EDUCATION/TRAINING PROGRAM

## 2025-03-10 PROCEDURE — 36415 COLL VENOUS BLD VENIPUNCTURE: CPT | Performed by: PHYSICIAN ASSISTANT

## 2025-03-10 PROCEDURE — 99222 1ST HOSP IP/OBS MODERATE 55: CPT | Mod: ,,,

## 2025-03-10 PROCEDURE — 27000248 HC VAD-ADDITIONAL DAY

## 2025-03-10 PROCEDURE — 85025 COMPLETE CBC W/AUTO DIFF WBC: CPT | Performed by: STUDENT IN AN ORGANIZED HEALTH CARE EDUCATION/TRAINING PROGRAM

## 2025-03-10 PROCEDURE — 20600001 HC STEP DOWN PRIVATE ROOM

## 2025-03-10 PROCEDURE — 99223 1ST HOSP IP/OBS HIGH 75: CPT | Mod: ,,, | Performed by: INTERNAL MEDICINE

## 2025-03-10 PROCEDURE — 36415 COLL VENOUS BLD VENIPUNCTURE: CPT | Performed by: STUDENT IN AN ORGANIZED HEALTH CARE EDUCATION/TRAINING PROGRAM

## 2025-03-10 PROCEDURE — 82272 OCCULT BLD FECES 1-3 TESTS: CPT | Performed by: EMERGENCY MEDICINE

## 2025-03-10 RX ORDER — SEVELAMER CARBONATE 800 MG/1
800 TABLET, FILM COATED ORAL
Status: DISCONTINUED | OUTPATIENT
Start: 2025-03-10 | End: 2025-03-12 | Stop reason: HOSPADM

## 2025-03-10 RX ORDER — WARFARIN 2.5 MG/1
2.5 TABLET ORAL
Status: DISCONTINUED | OUTPATIENT
Start: 2025-03-11 | End: 2025-03-12 | Stop reason: HOSPADM

## 2025-03-10 RX ORDER — PREGABALIN 50 MG/1
100 CAPSULE ORAL EVERY OTHER DAY
Status: DISCONTINUED | OUTPATIENT
Start: 2025-03-11 | End: 2025-03-12 | Stop reason: HOSPADM

## 2025-03-10 RX ORDER — ATORVASTATIN CALCIUM 40 MG/1
40 TABLET, FILM COATED ORAL NIGHTLY
Status: DISCONTINUED | OUTPATIENT
Start: 2025-03-10 | End: 2025-03-12 | Stop reason: HOSPADM

## 2025-03-10 RX ORDER — HEPARIN SODIUM 1000 [USP'U]/ML
1000 INJECTION, SOLUTION INTRAVENOUS; SUBCUTANEOUS
Status: DISCONTINUED | OUTPATIENT
Start: 2025-03-10 | End: 2025-03-12 | Stop reason: HOSPADM

## 2025-03-10 RX ORDER — GLUCAGON 1 MG
1 KIT INJECTION
Status: DISCONTINUED | OUTPATIENT
Start: 2025-03-10 | End: 2025-03-11

## 2025-03-10 RX ORDER — AMIODARONE HYDROCHLORIDE 200 MG/1
400 TABLET ORAL DAILY
Status: DISCONTINUED | OUTPATIENT
Start: 2025-03-10 | End: 2025-03-12 | Stop reason: HOSPADM

## 2025-03-10 RX ORDER — IBUPROFEN 200 MG
24 TABLET ORAL
Status: DISCONTINUED | OUTPATIENT
Start: 2025-03-10 | End: 2025-03-11

## 2025-03-10 RX ORDER — VENLAFAXINE 37.5 MG/1
37.5 TABLET ORAL DAILY
Status: DISCONTINUED | OUTPATIENT
Start: 2025-03-10 | End: 2025-03-12 | Stop reason: HOSPADM

## 2025-03-10 RX ORDER — SODIUM CHLORIDE 9 MG/ML
INJECTION, SOLUTION INTRAVENOUS ONCE
Status: DISCONTINUED | OUTPATIENT
Start: 2025-03-10 | End: 2025-03-12 | Stop reason: HOSPADM

## 2025-03-10 RX ORDER — INSULIN ASPART 100 [IU]/ML
0-5 INJECTION, SOLUTION INTRAVENOUS; SUBCUTANEOUS
Status: DISCONTINUED | OUTPATIENT
Start: 2025-03-10 | End: 2025-03-11

## 2025-03-10 RX ORDER — IBUPROFEN 200 MG
16 TABLET ORAL
Status: DISCONTINUED | OUTPATIENT
Start: 2025-03-10 | End: 2025-03-11

## 2025-03-10 RX ORDER — CHOLECALCIFEROL (VITAMIN D3) 25 MCG
1000 TABLET ORAL
Qty: 30 TABLET | Refills: 0 | Status: SHIPPED | OUTPATIENT
Start: 2025-03-10

## 2025-03-10 RX ADMIN — SEVELAMER CARBONATE 800 MG: 800 TABLET, FILM COATED ORAL at 01:03

## 2025-03-10 RX ADMIN — ATORVASTATIN CALCIUM 40 MG: 40 TABLET, FILM COATED ORAL at 09:03

## 2025-03-10 RX ADMIN — VENLAFAXINE 37.5 MG: 37.5 TABLET ORAL at 11:03

## 2025-03-10 RX ADMIN — AMIODARONE HYDROCHLORIDE 400 MG: 200 TABLET ORAL at 11:03

## 2025-03-10 RX ADMIN — TRAZODONE HYDROCHLORIDE 25 MG: 50 TABLET ORAL at 09:03

## 2025-03-10 RX ADMIN — WARFARIN SODIUM 1.25 MG: 2.5 TABLET ORAL at 06:03

## 2025-03-10 RX ADMIN — SEVELAMER CARBONATE 800 MG: 800 TABLET, FILM COATED ORAL at 06:03

## 2025-03-10 NOTE — TELEPHONE ENCOUNTER
13:40- Page received from patient's wife: LFA x 1, /72, sitting in wheelchair, asymptomatic. Went to HD on Friday and they took off 1.7L. He wiped some blood after a BM this morning but has gone since and no blood. I asked that he take 25mg of hydralazine now as BP is elevated. Wife will call back in an hour with reading and f/u.    17:35- Page received from [facility]. Wife called 911 after patient had a bloody stool and is now reporting weakness. He did not lose consciousness or fall. Per EMS, he was weak getting to the stretcher. BP is 133/80, no more VAD alarms. Will report to either local ER or [facility]. I informed Dr. Mcintyre via phone.

## 2025-03-10 NOTE — SUBJECTIVE & OBJECTIVE
Past Medical History:   Diagnosis Date    Aspiration pneumonia of both lungs 06/17/2024    CAD (coronary artery disease)     CHF (congestive heart failure)     Delirium 06/16/2024    Diabetes mellitus     HFrEF (heart failure with reduced ejection fraction)     Hypoglycemia 06/12/2024    ICD (implantable cardioverter-defibrillator) in place     LVAD (left ventricular assist device) present 12/01/2023    MI, old     PAF (paroxysmal atrial fibrillation) 10/11/2023    Stage 4 chronic kidney disease 02/15/2024    Type 2 diabetes mellitus without complication, without long-term current use of insulin 10/07/2023       Past Surgical History:   Procedure Laterality Date    ANGIOPLASTY-VENOUS ARTERY Right 12/1/2023    Procedure: ANGIOPLASTY-VENOUS ARTERY, RIGHT FEMORAL;  Surgeon: Yuri Washington MD;  [facility];  Service: Cardiovascular;  Laterality: Right;    AORTIC VALVULOPLASTY N/A 12/1/2023    Procedure: REPAIR, AORTIC VALVE;  Surgeon: Yuri Washington MD;  [facility];  Service: Cardiovascular;  Laterality: N/A;    CARDIAC SURGERY      CLOSURE N/A 12/1/2023    Procedure: CLOSURE, TEMPORARY;  Surgeon: Yuri Washington MD;  Location: [facility] OR [facility] FLR;  Service: Cardiovascular;  Laterality: N/A;    DRAINAGE OF PLEURAL EFFUSION  12/4/2023    Procedure: DRAINAGE, PLEURAL EFFUSION;  Surgeon: Yuri Washington MD;  Location: [facility] OR [facility] FLR;  Service: Cardiovascular;;    INSERTION OF GRAFT TO PERICARDIUM  12/4/2023    Procedure: INSERTION, GRAFT, PERICARDIUM;  Surgeon: Yuri Washington MD;  [facility];  Service: Cardiovascular;;    INSERTION OF INTRA-AORTIC BALLOON ASSIST DEVICE Right 11/21/2023    Procedure: INSERTION, INTRA-AORTIC BALLOON PUMP;  Surgeon: Finn Cohn MD;  Location: [facility] CATH LAB;  Service: Cardiology;  Laterality: Right;    LEFT VENTRICULAR ASSIST DEVICE Left 12/1/2023    Procedure: INSERTION-LEFT VENTRICULAR ASSIST DEVICE;  Surgeon: Yuri Washington MD;   Location: [address] FLR;  Service: Cardiovascular;  Laterality: Left;  REDO STERNOTOMY - REDO SAW NEEDED FOR CASE    LYSIS OF ADHESIONS  12/1/2023    Procedure: LYSIS, ADHESIONS;  Surgeon: Yuri Washington MD;  [address];  Service: Cardiovascular;;    PLACEMENT OF SWAN ROLANDO CATHETER WITH IMAGING GUIDANCE  11/20/2023    Procedure: INSERTION, CATHETER, SWAN-ROLANDO, WITH IMAGING GUIDANCE;  Surgeon: Sajan Hurley MD;  Location: [facility] CATH LAB;  Service: Cardiology;;    REMOVAL OF TUNNELED CENTRAL VENOUS CATHETER (CVC) N/A 3/1/2024    Procedure: REMOVAL, CATHETER, CENTRAL VENOUS, TUNNELED;  Surgeon: Seble Aguilar MD;  Location: [facility] CATH LAB;  Service: Interventional Nephrology;  Laterality: N/A;    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 12/1/2023    Procedure: REPAIR, ANEURYSM, ARTERY, FEMORAL;  Surgeon: Yuri Washington MD;  [address];  Service: Cardiovascular;  Laterality: Right;  Right Femoral Artery Repair    RIGHT HEART CATHETERIZATION Right 10/10/2023    Procedure: INSERTION, CATHETER, RIGHT HEART;  Surgeon: Bin Gandhi MD;  Location: [facility] CATH LAB;  Service: Cardiology;  Laterality: Right;    RIGHT HEART CATHETERIZATION Right 10/13/2023    Procedure: INSERTION, CATHETER, RIGHT HEART;  Surgeon: Walter Mcintyre MD;  Location: [facility] CATH LAB;  Service: Cardiology;  Laterality: Right;    RIGHT HEART CATHETERIZATION  11/13/2023    RIGHT HEART CATHETERIZATION Right 11/13/2023    Procedure: INSERTION, CATHETER, RIGHT HEART;  Surgeon: Juventino Bermudez Jr., MD;  Location: [facility] CATH LAB;  Service: Cardiology;  Laterality: Right;    RIGHT HEART CATHETERIZATION Right 11/20/2023    Procedure: INSERTION, CATHETER, RIGHT HEART;  Surgeon: Sajan Hurley MD;  Location: [facility] CATH LAB;  Service: Cardiology;  Laterality: Right;    RIGHT HEART CATHETERIZATION Right 1/22/2024    Procedure: INSERTION, CATHETER, RIGHT HEART;  Surgeon: Brayan Ocampo MD;  Location: [facility]  LAB;  Service: Cardiology;  Laterality: Right;    STERNAL WOUND CLOSURE N/A 12/4/2023    Procedure: CLOSURE, WOUND, STERNUM;  Surgeon: Yuri Washington MD;  [facility];  Service: Cardiovascular;  Laterality: N/A;    STERNOTOMY N/A 12/1/2023    Procedure: STERNOTOMY, REDO;  Surgeon: Yuri Washington MD;  [facility];  Service: Cardiovascular;  Laterality: N/A;    VALVULOPLASTY, MITRAL VALVE N/A 12/1/2023    Procedure: VALVULOPLASTY, MITRAL VALVE;  Surgeon: Yuri Washington MD;  [facility];  Service: Cardiovascular;  Laterality: N/A;       Review of patient's allergies indicates:  No Known Allergies    Current Facility-Administered Medications   Medication    amiodarone tablet 400 mg    atorvastatin tablet 40 mg    [START ON 3/11/2025] pregabalin capsule 100 mg    sevelamer carbonate tablet 800 mg    trazodone split tablet 25 mg    venlafaxine tablet 37.5 mg    [START ON 3/11/2025] warfarin (COUMADIN) tablet 2.5 mg     Family History    None       Tobacco Use    Smoking status: Former     Current packs/day: 0.50     Types: Cigarettes    Smokeless tobacco: Not on file   Substance and Sexual Activity    Alcohol use: Yes     Comment: rarely    Drug use: No    Sexual activity: Not Currently     Partners: Female     Review of [id number] point ROS negative except for HPI  Objective:     Vital Signs (Most Recent):  Temp: 98.9 °F (37.2 °C) (03/10/25 1015)  Pulse: 78 (03/10/25 1032)  Resp: 18 (03/10/25 1015)  BP: 116/71 (03/10/25 1032)  SpO2: 98 % (03/10/25 1015) Vital Signs (24h Range):  Temp:  [98.9 °F (37.2 °C)-99.7 °F (37.6 °C)] 98.9 °F (37.2 °C)  Pulse:  [62-87] 78  Resp:  [14-23] 18  SpO2:  [97 %-100 %] 98 %  BP: ()/(0-96) 116/71     No data found.  There is no height or weight on file to calculate BMI.    No intake or output data in the 24 hours ending 03/10/25 1106       Physical Exam  Constitutional:       General: He is not in acute distress.     Appearance: Normal  "appearance.   HENT:      Head: Normocephalic and atraumatic.   Eyes:      Conjunctiva/sclera: Conjunctivae normal.   Neck:      Vascular: JVD present.   Cardiovascular:      Comments: VAD hum appreciated  Pulmonary:      Breath sounds: Normal breath sounds.      Comments: Clear to auscultation  Abdominal:      General: There is distension.      Comments: Soft, non-tender, non-distended   Musculoskeletal:      Cervical back: Normal range of motion.      Comments: No peripheral edema   Skin:     General: Skin is warm and dry.      Capillary Refill: Capillary refill takes less than 2 seconds.   Neurological:      Mental Status: He is alert and oriented to person, place, and time.   Psychiatric:         Mood and Affect: Mood and affect normal.            Significant Labs:  CBC:  Recent Labs   Lab 03/09/25 2023   WBC 8.07   RBC 4.28*   HGB 11.3*   HCT 37.3*      MCV 87   MCH 26.4*   MCHC 30.3*     BNP:  Recent Labs   Lab 03/09/25 2023   *     CMP:  Recent Labs   Lab 03/09/25 2023   *   CALCIUM 8.9   ALBUMIN 2.8*   PROT 8.0   *   K 5.0   CO2 19*   CL 97   BUN 52*   CREATININE 8.5*   ALKPHOS 161*   *   *   BILITOT 0.6      Coagulation:   Recent Labs   Lab 03/06/25  0941 03/09/25 2023   INR 2.9* 2.6*   APTT  --  35.7*     LDH:  No results for input(s): "LDH" in the last 72 hours.  Microbiology:  Microbiology Results (last 7 days)       ** No results found for the last 168 hours. **            I have reviewed all pertinent labs within the past 24 hours.    Diagnostic Results:  I have reviewed all pertinent imaging results/findings within the past 24 hours.    "

## 2025-03-10 NOTE — ED NOTES
CINTHIA FROM [facility] CALLED TO CONFIRM PT BED ASSIGNMENT 3095 AT [facility] IN [place]. NUMBER FOR REPORT [phone]. STATES HE WILL SET UP PT TRANSPORT TO FACILITY ASAP

## 2025-03-10 NOTE — CONSULTS
[facility] - Cardiac Intensive Care  Nephrology  Consult Note    Patient Name: Radha Abbott  MRN: [id number]  Admission Date: 3/10/2025  Hospital Length of Stay: 0 days  Attending Provider: Walter Mcintyre MD   Primary Care Physician: Vasu Kong MD  Principal Problem:<principal problem not specified>    Inpatient consult to Nephrology  Consult performed by: Malina Rivas PA-C  Consult ordered by: Angela Shen MD  Reason for consult: ESRD        Subjective:     HPI: Radha Abbott is a 64 y.o. male patient with a PMHx of T2DM, stage 4 CKD, PAF, ICD, CHF, CAD, aspiration pneumonia of both lungs, and MI who presents to the Emergency Department for evaluation of fatigue which began earlier today. Pt has LVAD in place and wife reports it went off twice today. Wife reports LVAD displayed PI of 11 and flow of 2.5 earlier today. Pt had the LVAD placed 12/1/2023 and last had it checked 2 months ago. Pt is in Mon/Wed/Fri dialysis and last had dialyzed 2 days ago. Associated sxs include HTN, decreased appetite for the last 2-3 weeks, bloody stool, and 1 episode of weakness while urinating at 2:30 today. Wife reports pt's normal blood pressure is 90/60. Wife reports pt also fell twice: first time 5 days ago, and second time earlier today. Wife reports pt hit is head during his fall today. Symptoms are constant and moderate in severity. No mitigating or exacerbating factors reported. Wife denies any fever, nausea, or emesis from pt. Wife reports pt is on fluid restriction diet and has been doing well with. Prior Tx includes 1 dose of Hydroxyzine 25 mg as instructed by LVAD team over the phone.  Patient was transferred [facility] further evaluation of LVAD. Nephrology consulted for ESRD management.     Past Medical History:   Diagnosis Date    Aspiration pneumonia of both lungs 06/17/2024    CAD (coronary artery disease)     CHF (congestive heart failure)     Delirium 06/16/2024    Diabetes mellitus     HFrEF (heart  failure with reduced ejection fraction)     Hypoglycemia 06/12/2024    ICD (implantable cardioverter-defibrillator) in place     LVAD (left ventricular assist device) present 12/01/2023    MI, old     PAF (paroxysmal atrial fibrillation) 10/11/2023    Stage 4 chronic kidney disease 02/15/2024    Type 2 diabetes mellitus without complication, without long-term current use of insulin 10/07/2023       Past Surgical History:   Procedure Laterality Date    ANGIOPLASTY-VENOUS ARTERY Right 12/1/2023    Procedure: ANGIOPLASTY-VENOUS ARTERY, RIGHT FEMORAL;  Surgeon: Yuri Washington MD;  [facility];  Service: Cardiovascular;  Laterality: Right;    AORTIC VALVULOPLASTY N/A 12/1/2023    Procedure: REPAIR, AORTIC VALVE;  Surgeon: Yuri Washington MD;  [facility];  Service: Cardiovascular;  Laterality: N/A;    CARDIAC SURGERY      CLOSURE N/A 12/1/2023    Procedure: CLOSURE, TEMPORARY;  Surgeon: Yuri Washington MD;  [facility];  Service: Cardiovascular;  Laterality: N/A;    DRAINAGE OF PLEURAL EFFUSION  12/4/2023    Procedure: DRAINAGE, PLEURAL EFFUSION;  Surgeon: Yuri Washington MD;  Location: [facility] OR [facility] FLR;  Service: Cardiovascular;;    INSERTION OF GRAFT TO PERICARDIUM  12/4/2023    Procedure: INSERTION, GRAFT, PERICARDIUM;  Surgeon: Yuri Washington MD;  [facility];  Service: Cardiovascular;;    INSERTION OF INTRA-AORTIC BALLOON ASSIST DEVICE Right 11/21/2023    Procedure: INSERTION, INTRA-AORTIC BALLOON PUMP;  Surgeon: Finn Cohn MD;  Location: [facility] CATH LAB;  Service: Cardiology;  Laterality: Right;    LEFT VENTRICULAR ASSIST DEVICE Left 12/1/2023    Procedure: INSERTION-LEFT VENTRICULAR ASSIST DEVICE;  Surgeon: Yuri Washington MD;  Location: [facility] OR [facility] FLR;  Service: Cardiovascular;  Laterality: Left;  REDO STERNOTOMY - REDO SAW NEEDED FOR CASE    LYSIS OF ADHESIONS  12/1/2023    Procedure: LYSIS, ADHESIONS;  Surgeon: Yuri Washington MD;  Location: [facility]  OR 2ND FLR;  Service: Cardiovascular;;    PLACEMENT OF SWAN ROLANDO CATHETER WITH IMAGING GUIDANCE  11/20/2023    Procedure: INSERTION, CATHETER, SWAN-ROLANDO, WITH IMAGING GUIDANCE;  Surgeon: Sajan Hurley MD;  Location: [facility] CATH LAB;  Service: Cardiology;;    REMOVAL OF TUNNELED CENTRAL VENOUS CATHETER (CVC) N/A 3/1/2024    Procedure: REMOVAL, CATHETER, CENTRAL VENOUS, TUNNELED;  Surgeon: Seble Aguilar MD;  Location: [facility] CATH LAB;  Service: Interventional Nephrology;  Laterality: N/A;    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 12/1/2023    Procedure: REPAIR, ANEURYSM, ARTERY, FEMORAL;  Surgeon: Yuri Washington MD;  [facility];  Service: Cardiovascular;  Laterality: Right;  Right Femoral Artery Repair    RIGHT HEART CATHETERIZATION Right 10/10/2023    Procedure: INSERTION, CATHETER, RIGHT HEART;  Surgeon: Bin Gandhi MD;  Location: [facility] CATH LAB;  Service: Cardiology;  Laterality: Right;    RIGHT HEART CATHETERIZATION Right 10/13/2023    Procedure: INSERTION, CATHETER, RIGHT HEART;  Surgeon: Walter Mcintyre MD;  Location: [facility] CATH LAB;  Service: Cardiology;  Laterality: Right;    RIGHT HEART CATHETERIZATION  11/13/2023    RIGHT HEART CATHETERIZATION Right 11/13/2023    Procedure: INSERTION, CATHETER, RIGHT HEART;  Surgeon: Juventino Bermudez Jr., MD;  Location: [facility] CATH LAB;  Service: Cardiology;  Laterality: Right;    RIGHT HEART CATHETERIZATION Right 11/20/2023    Procedure: INSERTION, CATHETER, RIGHT HEART;  Surgeon: Sajan Hurley MD;  Location: [facility] CATH LAB;  Service: Cardiology;  Laterality: Right;    RIGHT HEART CATHETERIZATION Right 1/22/2024    Procedure: INSERTION, CATHETER, RIGHT HEART;  Surgeon: Brayan Ocampo MD;  Location: [facility] CATH LAB;  Service: Cardiology;  Laterality: Right;    STERNAL WOUND CLOSURE N/A 12/4/2023    Procedure: CLOSURE, WOUND, STERNUM;  Surgeon: Yuri Washington MD;  Location: [facility] OR 2[facility] FLR;  Service: Cardiovascular;  Laterality: N/A;     STERNOTOMY N/A 12/1/2023    Procedure: STERNOTOMY, REDO;  Surgeon: Yuri Washington MD;  Location: [facility] OR [address] [place] [address];  Service: Cardiovascular;  Laterality: N/A;    VALVULOPLASTY, MITRAL VALVE N/A 12/1/2023    Procedure: VALVULOPLASTY, MITRAL VALVE;  Surgeon: Yuri Washington MD;  [facility];  Service: Cardiovascular;  Laterality: N/A;       Review of patient's allergies indicates:  No Known Allergies  Current Facility-Administered Medications   Medication Frequency    amiodarone tablet 400 mg Daily    atorvastatin tablet 40 mg QHS    dextrose 50% injection 12.5 g PRN    dextrose 50% injection 25 g PRN    glucagon (human recombinant) injection 1 mg PRN    glucose chewable tablet 16 g PRN    glucose chewable tablet 24 g PRN    insulin aspart U-100 pen 0-5 Units QID (AC + HS) PRN    [START ON 3/11/2025] pregabalin capsule 100 mg Every other day    sevelamer carbonate tablet 800 mg TID WM    trazodone split tablet 25 mg QHS    venlafaxine tablet 37.5 mg Daily    warfarin (COUMADIN) split tablet 1.25 mg Every Mon, Wed, Fri    [START ON 3/11/2025] warfarin (COUMADIN) tablet 2.5 mg Every Tues, Thurs, Sat, Sun     Family History    None       Tobacco Use    Smoking status: Former     Current packs/day: 0.50     Types: Cigarettes    Smokeless tobacco: Not on file   Substance and Sexual Activity    Alcohol use: Yes     Comment: rarely    Drug use: No    Sexual activity: Not Currently     Partners: Female     Review of Systems   Constitutional:  Positive for fatigue. Negative for fever.   Respiratory:  Negative for shortness of breath.    Cardiovascular:  Negative for chest pain.   Gastrointestinal:  Positive for blood in stool. Negative for diarrhea, nausea and vomiting.   Genitourinary:  Positive for decreased urine volume (2/2 ESRD).   Neurological:  Positive for weakness.     Objective:     Vital Signs (Most Recent):  Temp: 98.9 °F (37.2 °C) (03/10/25 1220)  Pulse: 79 (03/10/25 1220)  Resp: 18 (03/10/25  1220)  BP: (!) 84/0 (03/10/25 1220)  SpO2: 97 % (03/10/25 1220) Vital Signs (24h Range):  Temp:  [98.9 °F (37.2 °C)-99.7 °F (37.6 °C)] 98.9 °F (37.2 °C)  Pulse:  [62-87] 79  Resp:  [14-23] 18  SpO2:  [97 %-100 %] 97 %  BP: ()/(0-96) 84/0        There is no height or weight on file to calculate BMI.  There is no height or weight on file to calculate BSA.    No intake/output data recorded.     Physical Exam  Vitals and nursing note reviewed.   Constitutional:       General: He is not in acute distress.  Eyes:      Conjunctiva/sclera: Conjunctivae normal.   Pulmonary:      Effort: Pulmonary effort is normal. No respiratory distress.   Abdominal:      General: There is distension.   Musculoskeletal:      Right lower leg: No edema.      Left lower leg: No edema.   Skin:     General: Skin is warm and dry.   Neurological:      General: No focal deficit present.      Mental Status: He is alert.   Psychiatric:         Mood and Affect: Mood normal.          Significant Labs:  CBC:   Recent Labs   Lab 03/10/25  1139   WBC 8.77   RBC 3.94*   HGB 10.5*   HCT 34.6*      MCV 88   MCH 26.6*   MCHC 30.3*     CMP:   Recent Labs   Lab 03/10/25  1139   GLU 98   CALCIUM 8.4*   ALBUMIN 2.2*   PROT 7.0   *   K 4.9   CO2 23   CL 99   BUN 62*   CREATININE 8.2*   ALKPHOS 151*   *   *   BILITOT 0.6     All labs within the past 24 hours have been reviewed.    Assessment/Plan:     Cardiac/Vascular  Acute combined systolic and diastolic congestive heart failure  -management per primary    LVAD (left ventricular assist device) present  -management per primary    Renal/  ESRD (end stage renal disease)  64 year old male hx of ESRD on HD MWF. Last HD Friday 3/7/25. Nephrology consulted for management of ESRD.     Nephrology History  -Outpatient HD unit: [facility]  -Nephrologist: ?  -HD tx days: MWF  -HD prescription: 3 hrs, F160, 350/800  -Last HD tx: 3/7/25  -HD access: R CVC  -HD modality: iHD  -Residual urine:  minimal  -EDW:  81 kg    Plan/Recommendations  ESRD on HD:  -No need for emergent RRT, electrolytes stable. Plan for HD this evening/overnight to resume MWF schedule pending HD nurse availability, need to triage patients.   -Will continue iHD thrice weekly unless emergent indication arises  -Strict I&Os  -Pre & post HD weight  Anemia in ESRD  -Hgb goal 10-11, hgb at goal  Mineral Bone Disease in ESRD  -Continue phos binders  -Renal diet if not NPO      GI  Left flank pain  -management per primary        Thank you for your consult. I will follow-up with patient. Please contact us if you have any additional questions.    Malina Rivas PA-C  Nephrology  [facility] - Cardiac Intensive Care

## 2025-03-10 NOTE — HPI
Mr. Radha Abbott is a 63 y/o AAM w/ PMHx of stage D HFrEF, ICM s/p HM3 as DT (12/1/2023), ESRD on HD (MWF), and debility who presented to ED in [place] w/ complaints of  falls x2. States when he leans over at 45 degree angle he is not able to lift himself back up leading to his falls. States he has been having left sided flank/hip pain for about 1 mth now that is worse with 45 degree lean and better w/ laying flat.     SOB, chest pain, nausea, vomiting, endorses abd distention, no fever or chills, orthopnea, pND. LFA     Pt has LVAD in place and wife reports it went off twice today. Wife reports LVAD displayed PI of 11 and flow of 2.5 earlier today. Pt had the LVAD placed 12/1/2023 and last had it checked 2 months ago. Pt is in Mon/Wed/Fri dialysis and last had dialyzed 2 days ago. Associated sxs include HTN, decreased appetite for the last 2-3 weeks, bloody stool, and 1 episode of weakness while urinating at 2:30 today. Wife reports pt's normal blood pressure is 90/60. Wife reports pt also fell twice: first time 5 days ago, and second time earlier today. Wife reports pt hit is head during his fall today. Symptoms are constant and moderate in severity. No mitigating or exacerbating factors reported. Wife denies any fever, nausea, or emesis from pt. Wife reports pt is on fluid restriction diet and has been doing well with. Prior Tx includes 1 dose of Hydroxyzine 25 mg as instructed by LVAD team over the phone.  No further complaints or concerns at this time.     Home Medications:   amiodarone HCl 400 mg Oral Daily  atorvastatin calcium 40 mg Oral Nightl  gabapentin 300 mg Nightly  pantoprazole sodium 40 mg Oral Daily  trazodone HCl 25 mg Oral Nightly  venlafaxine HCl 37.5 mg Oral  warfarin sodium 2.5 MG daily except MWF      Cardiac Imaging:  Echo (12/19/2024):     Left Ventricle: The left ventricle is normal in size. Ventricular mass is normal. Normal wall thickness. There is severely reduced systolic function  with a visually estimated ejection fraction of 10 -15%. Diastolic function cannot be reliably determined in the presence of mitral valve repair and presence of LVAD. Limtited view od LV apex due to near field artifcat. No obvious thrombus seen.    Right Ventricle: Normal right ventricular cavity size. Wall thickness is normal. Right ventricle wall motion  is normal. Systolic function is moderately reduced.    Left Atrium: Left atrium is moderately dilated.    Right Atrium: Right atrium is mildly dilated. Lead present in the right atrium.    Mitral Valve: The mitral valve is repaired with an Noble stitch.    Aorta: Aortic root is normal in size measuring 2.77 cm. Ascending aorta is normal measuring 3.26 cm.    Pulmonary Artery: The estimated pulmonary artery systolic pressure is 33 mmHg.    IVC/SVC: Intermediate venous pressure at 8 mmHg.    Pericardium: There is no pericardial effusion.    RHC (1/22/2024):    RHC performed via the right IJ. Patient tolerated the procedure well. Elevated left and right side filling pressures (RA= 13 mm of Hg, PCWP=22 mm of Hg). Mild to moderate pulmonary HTN  (PA= 50/25 mm of Hg, PA mean=35 mm of Hg) in the setting of elevated left sided filling pressures. Normal high cardiac index and output  (CI=2.98 L/min/m2, CO=5.8 L/min) with HM3 support at LVAD speed of 4900 rpm.    Recommend gentle IV diuresis/Echo to reassess LV /RV size and speed adjustment.

## 2025-03-10 NOTE — H&P
[facility] - [facility]  Heart Transplant  H&P    Patient Name: Radha Abbott  MRN: [id number]  Admission Date: 3/10/2025  Attending Physician: Walter Mcintyre MD  Primary Care Provider: Vasu Kong MD  Principal Problem:<principal problem not specified>    Subjective:     History of Present Illness:  Mr. Radha Abbott is a 65 y/o AAM w/ PMHx of stage D HFrEF, ICM s/p HM3 as DT (12/1/2023), ESRD on HD (MWF), and debility who presented to ED in [place] w/ complaints of  falls x2. States when he leans over at 45 degree angle he is not able to lift himself back up leading to his falls. States he has been having left sided flank/hip pain for about 1 mth now that is worse with 45 degree lean and better w/ laying flat.     SOB, chest pain, nausea, vomiting, endorses abd distention, no fever or chills, orthopnea, pND. LFA     Pt has LVAD in place and wife reports it went off twice today. Wife reports LVAD displayed PI of 11 and flow of 2.5 earlier today. Pt had the LVAD placed 12/1/2023 and last had it checked 2 months ago. Pt is in Mon/Wed/Fri dialysis and last had dialyzed 2 days ago. Associated sxs include HTN, decreased appetite for the last 2-3 weeks, bloody stool, and 1 episode of weakness while urinating at 2:30 today. Wife reports pt's normal blood pressure is 90/60. Wife reports pt also fell twice: first time 5 days ago, and second time earlier today. Wife reports pt hit is head during his fall today. Symptoms are constant and moderate in severity. No mitigating or exacerbating factors reported. Wife denies any fever, nausea, or emesis from pt. Wife reports pt is on fluid restriction diet and has been doing well with. Prior Tx includes 1 dose of Hydroxyzine 25 mg as instructed by LVAD team over the phone.  No further complaints or concerns at this time.     Home Medications:   amiodarone HCl 400 mg Oral Daily  atorvastatin calcium 40 mg Oral Nightl  gabapentin 300 mg Nightly  pantoprazole sodium  40 mg Oral Daily  trazodone HCl 25 mg Oral Nightly  venlafaxine HCl 37.5 mg Oral  warfarin sodium 2.5 MG daily except MWF      Cardiac Imaging:  Echo (12/19/2024):     Left Ventricle: The left ventricle is normal in size. Ventricular mass is normal. Normal wall thickness. There is severely reduced systolic function with a visually estimated ejection fraction of 10 -15%. Diastolic function cannot be reliably determined in the presence of mitral valve repair and presence of LVAD. Limtited view od LV apex due to near field artifcat. No obvious thrombus seen.    Right Ventricle: Normal right ventricular cavity size. Wall thickness is normal. Right ventricle wall motion  is normal. Systolic function is moderately reduced.    Left Atrium: Left atrium is moderately dilated.    Right Atrium: Right atrium is mildly dilated. Lead present in the right atrium.    Mitral Valve: The mitral valve is repaired with an Noble stitch.    Aorta: Aortic root is normal in size measuring 2.77 cm. Ascending aorta is normal measuring 3.26 cm.    Pulmonary Artery: The estimated pulmonary artery systolic pressure is 33 mmHg.    IVC/SVC: Intermediate venous pressure at 8 mmHg.    Pericardium: There is no pericardial effusion.    RHC (1/22/2024):    RHC performed via the right IJ. Patient tolerated the procedure well. Elevated left and right side filling pressures (RA= 13 mm of Hg, PCWP=22 mm of Hg). Mild to moderate pulmonary HTN  (PA= 50/25 mm of Hg, PA mean=35 mm of Hg) in the setting of elevated left sided filling pressures. Normal high cardiac index and output  (CI=2.98 L/min/m2, CO=5.8 L/min) with HM3 support at LVAD speed of 4900 rpm.    Recommend gentle IV diuresis/Echo to reassess LV /RV size and speed adjustment.           Past Medical History:   Diagnosis Date    Aspiration pneumonia of both lungs 06/17/2024    CAD (coronary artery disease)     CHF (congestive heart failure)     Delirium 06/16/2024    Diabetes mellitus     HFrEF  (heart failure with reduced ejection fraction)     Hypoglycemia 06/12/2024    ICD (implantable cardioverter-defibrillator) in place     LVAD (left ventricular assist device) present 12/01/2023    MI, old     PAF (paroxysmal atrial fibrillation) 10/11/2023    Stage 4 chronic kidney disease 02/15/2024    Type 2 diabetes mellitus without complication, without long-term current use of insulin 10/07/2023       Past Surgical History:   Procedure Laterality Date    ANGIOPLASTY-VENOUS ARTERY Right 12/1/2023    Procedure: ANGIOPLASTY-VENOUS ARTERY, RIGHT FEMORAL;  Surgeon: Yuri Washington MD;  [facility];  Service: Cardiovascular;  Laterality: Right;    AORTIC VALVULOPLASTY N/A 12/1/2023    Procedure: REPAIR, AORTIC VALVE;  Surgeon: Yuri Washington MD;  [facility];  Service: Cardiovascular;  Laterality: N/A;    CARDIAC SURGERY      CLOSURE N/A 12/1/2023    Procedure: CLOSURE, TEMPORARY;  Surgeon: Yuri Washington MD;  [facility];  Service: Cardiovascular;  Laterality: N/A;    DRAINAGE OF PLEURAL EFFUSION  12/4/2023    Procedure: DRAINAGE, PLEURAL EFFUSION;  Surgeon: Yuri Washington MD;  [facility];  Service: Cardiovascular;;    INSERTION OF GRAFT TO PERICARDIUM  12/4/2023    Procedure: INSERTION, GRAFT, PERICARDIUM;  Surgeon: Yuri Washington MD;  [facility];  Service: Cardiovascular;;    INSERTION OF INTRA-AORTIC BALLOON ASSIST DEVICE Right 11/21/2023    Procedure: INSERTION, INTRA-AORTIC BALLOON PUMP;  Surgeon: Finn Cohn MD;  Location: [facility] CATH LAB;  Service: Cardiology;  Laterality: Right;    LEFT VENTRICULAR ASSIST DEVICE Left 12/1/2023    Procedure: INSERTION-LEFT VENTRICULAR ASSIST DEVICE;  Surgeon: Yuri Washington MD;  [facility];  Service: Cardiovascular;  Laterality: Left;  REDO STERNOTOMY - REDO SAW NEEDED FOR CASE    LYSIS OF ADHESIONS  12/1/2023    Procedure: LYSIS, ADHESIONS;  Surgeon: Yuri Washington MD;   [facility];  Service: Cardiovascular;;    PLACEMENT OF SWAN ROLANDO CATHETER WITH IMAGING GUIDANCE  11/20/2023    Procedure: INSERTION, CATHETER, SWAN-ROLANDO, WITH IMAGING GUIDANCE;  Surgeon: Sajan Hurley MD;  Location: [facility] CATH LAB;  Service: Cardiology;;    REMOVAL OF TUNNELED CENTRAL VENOUS CATHETER (CVC) N/A 3/1/2024    Procedure: REMOVAL, CATHETER, CENTRAL VENOUS, TUNNELED;  Surgeon: Seble Aguilar MD;  Location: [facility] CATH LAB;  Service: Interventional Nephrology;  Laterality: N/A;    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 12/1/2023    Procedure: REPAIR, ANEURYSM, ARTERY, FEMORAL;  Surgeon: Yuri Washington MD;  [facility];  Service: Cardiovascular;  Laterality: Right;  Right Femoral Artery Repair    RIGHT HEART CATHETERIZATION Right 10/10/2023    Procedure: INSERTION, CATHETER, RIGHT HEART;  Surgeon: Bin Gandhi MD;  Location: [facility] CATH LAB;  Service: Cardiology;  Laterality: Right;    RIGHT HEART CATHETERIZATION Right 10/13/2023    Procedure: INSERTION, CATHETER, RIGHT HEART;  Surgeon: Walter Mcintyre MD;  Location: [facility] CATH LAB;  Service: Cardiology;  Laterality: Right;    RIGHT HEART CATHETERIZATION  11/13/2023    RIGHT HEART CATHETERIZATION Right 11/13/2023    Procedure: INSERTION, CATHETER, RIGHT HEART;  Surgeon: Juventino Bermudez Jr., MD;  Location: [facility] CATH LAB;  Service: Cardiology;  Laterality: Right;    RIGHT HEART CATHETERIZATION Right 11/20/2023    Procedure: INSERTION, CATHETER, RIGHT HEART;  Surgeon: Sajan Hurley MD;  Location: [facility] CATH LAB;  Service: Cardiology;  Laterality: Right;    RIGHT HEART CATHETERIZATION Right 1/22/2024    Procedure: INSERTION, CATHETER, RIGHT HEART;  Surgeon: Brayan Ocampo MD;  Location: [facility] CATH LAB;  Service: Cardiology;  Laterality: Right;    STERNAL WOUND CLOSURE N/A 12/4/2023    Procedure: CLOSURE, WOUND, STERNUM;  Surgeon: Yuri Washington MD;  [facility];  Service: Cardiovascular;   Laterality: N/A;    STERNOTOMY N/A 12/1/2023    Procedure: STERNOTOMY, REDO;  Surgeon: Yuri Washington MD;  Location: [facility] OR [address] [place] [address];  Service: Cardiovascular;  Laterality: N/A;    VALVULOPLASTY, MITRAL VALVE N/A 12/1/2023    Procedure: VALVULOPLASTY, MITRAL VALVE;  Surgeon: Yuri Washington MD;  Location: [facility] OR [address] [place] [address];  Service: Cardiovascular;  Laterality: N/A;       Review of patient's allergies indicates:  No Known Allergies    Current Facility-Administered Medications   Medication    amiodarone tablet 400 mg    atorvastatin tablet 40 mg    [START ON 3/11/2025] pregabalin capsule 100 mg    sevelamer carbonate tablet 800 mg    trazodone split tablet 25 mg    venlafaxine tablet 37.5 mg    [START ON 3/11/2025] warfarin (COUMADIN) tablet 2.5 mg     Family History    None       Tobacco Use    Smoking status: Former     Current packs/day: 0.50     Types: Cigarettes    Smokeless tobacco: Not on file   Substance and Sexual Activity    Alcohol use: Yes     Comment: rarely    Drug use: No    Sexual activity: Not Currently     Partners: Female     Review of [id number] point ROS negative except for HPI  Objective:     Vital Signs (Most Recent):  Temp: 98.9 °F (37.2 °C) (03/10/25 1015)  Pulse: 78 (03/10/25 1032)  Resp: 18 (03/10/25 1015)  BP: 116/71 (03/10/25 1032)  SpO2: 98 % (03/10/25 1015) Vital Signs (24h Range):  Temp:  [98.9 °F (37.2 °C)-99.7 °F (37.6 °C)] 98.9 °F (37.2 °C)  Pulse:  [62-87] 78  Resp:  [14-23] 18  SpO2:  [97 %-100 %] 98 %  BP: ()/(0-96) 116/71     No data found.  There is no height or weight on file to calculate BMI.    No intake or output data in the 24 hours ending 03/10/25 1106       Physical Exam  Constitutional:       General: He is not in acute distress.     Appearance: Normal appearance.   HENT:      Head: Normocephalic and atraumatic.   Eyes:      Conjunctiva/sclera: Conjunctivae normal.   Neck:      Vascular: JVD present.   Cardiovascular:      Comments: VAD hum appreciated  Pulmonary:  "     Breath sounds: Normal breath sounds.      Comments: Clear to auscultation  Abdominal:      General: There is distension.      Comments: Soft, non-tender, non-distended   Musculoskeletal:      Cervical back: Normal range of motion.      Comments: No peripheral edema   Skin:     General: Skin is warm and dry.      Capillary Refill: Capillary refill takes less than 2 seconds.   Neurological:      Mental Status: He is alert and oriented to person, place, and time.   Psychiatric:         Mood and Affect: Mood and affect normal.            Significant Labs:  CBC:  Recent Labs   Lab 03/09/25 2023   WBC 8.07   RBC 4.28*   HGB 11.3*   HCT 37.3*      MCV 87   MCH 26.4*   MCHC 30.3*     BNP:  Recent Labs   Lab 03/09/25 2023   *     CMP:  Recent Labs   Lab 03/09/25 2023   *   CALCIUM 8.9   ALBUMIN 2.8*   PROT 8.0   *   K 5.0   CO2 19*   CL 97   BUN 52*   CREATININE 8.5*   ALKPHOS 161*   *   *   BILITOT 0.6      Coagulation:   Recent Labs   Lab 03/06/25  0941 03/09/25 2023   INR 2.9* 2.6*   APTT  --  35.7*     LDH:  No results for input(s): "LDH" in the last 72 hours.  Microbiology:  Microbiology Results (last 7 days)       ** No results found for the last 168 hours. **            I have reviewed all pertinent labs within the past 24 hours.    Diagnostic Results:  I have reviewed all pertinent imaging results/findings within the past 24 hours.    Assessment/Plan:     Left flank pain  - Appears to MSK related but will order abdominal U/S for further evaluation.   - PT consulted. Appreciate recommendations.     Falls  Fall 2/2 to left flank pain. See plan above.     Acute on chronic combined systolic and diastolic CHF, NYHA class 4  Secondary to ESRD. Expect to improve with scheduled dialysis. Nephrology consulted to resume dialysis.     Positive occult stool blood test  - most likely secondary to known hemorrhoids.   - Hgb stable and bleeding resolved.   - Will continue to monitor " for now.     LVAD (left ventricular assist device) present  Procedure: Device Interrogation Including analysis of device parameters  Current Settings: Ventricular Assist Device  Review of device function is stable/unstable stable  Anticoagulation w/ warfarin. Goal INR 1-2.   Has some LFA in VAD history but most in the setting of either isolated HTN or dialysis session. Will monitor for now.         3/10/2025    10:16 AM 1/30/2025     8:35 AM 12/19/2024    10:41 AM 11/21/2024    10:30 AM 10/24/2024    10:09 AM 10/16/2024     7:43 PM 9/26/2024    10:21 AM   TXP LVAD INTERROGATIONS   Type HeartMate3     HeartMate3    Flow 3     4.1    Speed 5000     5000    PI 8.8     6    Power ([place]) 3.4     3.6    Pulsatility  Intermittent pulse No Pulse Pulse No Pulse Intermittent pulse No Pulse             Angela Shen MD  Heart Transplant  [facility] - [facility]

## 2025-03-10 NOTE — NURSING
4 eyes skin assessment done with Jai. No new skin injury noted, picture of sacral taken in epic media.

## 2025-03-10 NOTE — ED NOTES
"CALLED NUMBER FOR REPORT AT THIS TIME. SPOKE WITH ESTEFANÍA WHO INFORMED ME THAT THEY COULD NOT RECEIVE PT REPORT AT THIS TIME DUE TO SHIFT CHANGE AND INSTRUCTED TO CALL BACK "LATER" AND UNABLE TO SPECIFY TIME FRAME. MD NOTIFIED.   "

## 2025-03-10 NOTE — ASSESSMENT & PLAN NOTE
Secondary to ESRD. Expect to improve with scheduled dialysis. Nephrology consulted to resume dialysis.

## 2025-03-10 NOTE — CONSULTS
[facility] - [facility] Int[facility]  Endocrinology  Diabetes Consult Note    Consult Requested by: Walter Mcintyre MD   Reason for admit: <principal problem not specified>    HISTORY OF PRESENT ILLNESS:  RReason for Consult: Management of T2DM, Hyperglycemia      Surgical Procedure and Date: HM3 placement as DT (23)      Diabetes diagnosis year: several years ago     Home Diabetes Medications:   Currently not taking meds       How often checking glucose at home? Once daily in the AM    BG readings on regimen: 70-130s  Hypoglycemia on the regimen?  No  Missed doses on regimen?  No     Diabetes Complications include:     Hypoglycemia , Diabetic nephropathy  , Diabetic chronic kidney disease     , and Diabetic peripheral neuropathy      Complicating diabetes co morbidities:   CHF and CKD        HPI:   Patient is a 64 y.o. male  PMHx of stage D HFrEF, ICM s/p HM3 as DT (2023), ESRD on HD (MWF), and debility who presented to ED in [facility]/ complaints of  falls x2. States when he leans over at 45 degree angle he is not able to lift himself back up leading to his falls. Endocrine consulted for BG management    Interval HPI:   No acute events overnight. Patient in room [id number] A. Blood glucose stable. BG at goal on current insulin regimen (SSI ). Steroid use- None .      Renal function- Abnormal -     Vasopressors-  None     Diet Cardiac Fluid - 1500mL; Standard Tray     Eatin%  Nausea: No  Hypoglycemia and intervention: No  Fever: No  TPN and/or TF: No     PMH, PSH, FH, SH updated and reviewed     ROS:       Review of Systems   Constitutional:  Negative for chills and fever.   Gastrointestinal:  Negative for vomiting.       Current Medications and/or Treatments Impacting Glycemic Control  Immunotherapy:    Immunosuppressants       None          Steroids:   Hormones (From admission, onward)      None          Pressors:    Autonomic Drugs (From admission, onward)      None       "    Hyperglycemia/Diabetes Medications:   Antihyperglycemics (From admission, onward)      None             PHYSICAL EXAMINATION:  Vitals:    03/10/25 1220   BP: (!) 84/0   Pulse: 79   Resp: 18   Temp: 98.9 °F (37.2 °C)     There is no height or weight on file to calculate BMI.     Physical Exam  Constitutional:       General: He is not in acute distress.  HENT:      Head: Normocephalic and atraumatic.   Pulmonary:      Effort: No respiratory distress.   Psychiatric:         Mood and Affect: Mood normal.         Behavior: Behavior normal.            Labs Reviewed and Include   Recent Labs   Lab 03/10/25  1139   GLU 98   CALCIUM 8.4*   ALBUMIN 2.2*   PROT 7.0   *   K 4.9   CO2 23   CL 99   BUN 62*   CREATININE 8.2*   ALKPHOS 151*   *   *   BILITOT 0.6     Lab Results   Component Value Date    WBC 8.77 03/10/2025    HGB 10.5 (L) 03/10/2025    HCT 34.6 (L) 03/10/2025    MCV 88 03/10/2025     03/10/2025     No results for input(s): "TSH", "FREET4" in the last 168 hours.  Lab Results   Component Value Date    HGBA1C 6.5 (H) 03/10/2025       Nutritional status:   Body mass index is 25.06 kg/m².  Lab Results   Component Value Date    ALBUMIN 2.2 (L) 03/10/2025    ALBUMIN 2.8 (L) 03/09/2025    ALBUMIN 2.9 (L) 02/27/2025     Lab Results   Component Value Date    PREALBUMIN 28 01/30/2025    PREALBUMIN 25 12/19/2024    PREALBUMIN 20 11/21/2024       Estimated Creatinine Clearance: 10.3 mL/min (A) (based on SCr of 8.2 mg/dL (H)).    Accu-Checks  Recent Labs     03/09/25  1917 03/09/25 2157   POCTGLUCOSE 156* 116*        ASSESSMENT and PLAN    Cardiac/Vascular  LVAD (left ventricular assist device) present  Managed by primary team  Optimize BG control      Acute on chronic combined systolic and diastolic CHF, NYHA class 4  Managed by primary team  Optimize BG control    Endocrine  Type 2 diabetes mellitus without complication, without long-term current use of insulin  BG goal: 140-180  Type 2 " diabetes.  Blood sugar stable.  Monitor on correction.     - -Start Novolog correction dose with ISF 50 starting at 200    - POCT Glucose before meals and at bedtime  - Hypoglycemia protocol in place      ** Please notify Endocrine for any change and/or advance in diet**  ** Please call Endocrine for any BG related issues **     Discharge Planning:   TBD. Please notify endocrinology prior to discharge.            Plan discussed with patient, family, and RN at bedside.     Albert Wilson PA-C  Endocrinology  [facility] - [facility]

## 2025-03-10 NOTE — SUBJECTIVE & OBJECTIVE
Past Medical History:   Diagnosis Date    Aspiration pneumonia of both lungs 06/17/2024    CAD (coronary artery disease)     CHF (congestive heart failure)     Delirium 06/16/2024    Diabetes mellitus     HFrEF (heart failure with reduced ejection fraction)     Hypoglycemia 06/12/2024    ICD (implantable cardioverter-defibrillator) in place     LVAD (left ventricular assist device) present 12/01/2023    MI, old     PAF (paroxysmal atrial fibrillation) 10/11/2023    Stage 4 chronic kidney disease 02/15/2024    Type 2 diabetes mellitus without complication, without long-term current use of insulin 10/07/2023       Past Surgical History:   Procedure Laterality Date    ANGIOPLASTY-VENOUS ARTERY Right 12/1/2023    Procedure: ANGIOPLASTY-VENOUS ARTERY, RIGHT FEMORAL;  Surgeon: Yuri Washington MD;  [facility];  Service: Cardiovascular;  Laterality: Right;    AORTIC VALVULOPLASTY N/A 12/1/2023    Procedure: REPAIR, AORTIC VALVE;  Surgeon: Yuri Washington MD;  [facility];  Service: Cardiovascular;  Laterality: N/A;    CARDIAC SURGERY      CLOSURE N/A 12/1/2023    Procedure: CLOSURE, TEMPORARY;  Surgeon: Yuri Washington MD;  Location: [facility];  Service: Cardiovascular;  Laterality: N/A;    DRAINAGE OF PLEURAL EFFUSION  12/4/2023    Procedure: DRAINAGE, PLEURAL EFFUSION;  Surgeon: Yuri Washington MD;  Location: [facility];  Service: Cardiovascular;;    INSERTION OF GRAFT TO PERICARDIUM  12/4/2023    Procedure: INSERTION, GRAFT, PERICARDIUM;  Surgeon: Yuri Washington MD;  [facility];  Service: Cardiovascular;;    INSERTION OF INTRA-AORTIC BALLOON ASSIST DEVICE Right 11/21/2023    Procedure: INSERTION, INTRA-AORTIC BALLOON PUMP;  Surgeon: Finn Cohn MD;  Location: [facility] CATH LAB;  Service: Cardiology;  Laterality: Right;    LEFT VENTRICULAR ASSIST DEVICE Left 12/1/2023    Procedure: INSERTION-LEFT VENTRICULAR ASSIST DEVICE;  Surgeon: Yuri Washington MD;   Location: [address] FLR;  Service: Cardiovascular;  Laterality: Left;  REDO STERNOTOMY - REDO SAW NEEDED FOR CASE    LYSIS OF ADHESIONS  12/1/2023    Procedure: LYSIS, ADHESIONS;  Surgeon: Yuri Washington MD;  [address];  Service: Cardiovascular;;    PLACEMENT OF SWAN ROLANDO CATHETER WITH IMAGING GUIDANCE  11/20/2023    Procedure: INSERTION, CATHETER, SWAN-ROLANDO, WITH IMAGING GUIDANCE;  Surgeon: Sajan Hurley MD;  Location: [facility] CATH LAB;  Service: Cardiology;;    REMOVAL OF TUNNELED CENTRAL VENOUS CATHETER (CVC) N/A 3/1/2024    Procedure: REMOVAL, CATHETER, CENTRAL VENOUS, TUNNELED;  Surgeon: Seble Aguilar MD;  Location: [facility] CATH LAB;  Service: Interventional Nephrology;  Laterality: N/A;    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 12/1/2023    Procedure: REPAIR, ANEURYSM, ARTERY, FEMORAL;  Surgeon: Yuri Washington MD;  [address]  Service: Cardiovascular;  Laterality: Right;  Right Femoral Artery Repair    RIGHT HEART CATHETERIZATION Right 10/10/2023    Procedure: INSERTION, CATHETER, RIGHT HEART;  Surgeon: Bin Gandhi MD;  Location: [facility] CATH LAB;  Service: Cardiology;  Laterality: Right;    RIGHT HEART CATHETERIZATION Right 10/13/2023    Procedure: INSERTION, CATHETER, RIGHT HEART;  Surgeon: Walter Mcintyre MD;  Location: [facility] CATH LAB;  Service: Cardiology;  Laterality: Right;    RIGHT HEART CATHETERIZATION  11/13/2023    RIGHT HEART CATHETERIZATION Right 11/13/2023    Procedure: INSERTION, CATHETER, RIGHT HEART;  Surgeon: Juventino Bermudez Jr., MD;  Location: [facility] CATH LAB;  Service: Cardiology;  Laterality: Right;    RIGHT HEART CATHETERIZATION Right 11/20/2023    Procedure: INSERTION, CATHETER, RIGHT HEART;  Surgeon: Sajan Hurley MD;  Location: [facility] CATH LAB;  Service: Cardiology;  Laterality: Right;    RIGHT HEART CATHETERIZATION Right 1/22/2024    Procedure: INSERTION, CATHETER, RIGHT HEART;  Surgeon: Brayan Ocampo MD;  Location: [facility]  LAB;  Service: Cardiology;  Laterality: Right;    STERNAL WOUND CLOSURE N/A 12/4/2023    Procedure: CLOSURE, WOUND, STERNUM;  Surgeon: Yuri Washington MD;  [facility];  Service: Cardiovascular;  Laterality: N/A;    STERNOTOMY N/A 12/1/2023    Procedure: STERNOTOMY, REDO;  Surgeon: Yuri Washington MD;  [facility];  Service: Cardiovascular;  Laterality: N/A;    VALVULOPLASTY, MITRAL VALVE N/A 12/1/2023    Procedure: VALVULOPLASTY, MITRAL VALVE;  Surgeon: Yuri Washington MD;  [facility];  Service: Cardiovascular;  Laterality: N/A;       Review of patient's allergies indicates:  No Known Allergies  Current Facility-Administered Medications   Medication Frequency    amiodarone tablet 400 mg Daily    atorvastatin tablet 40 mg QHS    dextrose 50% injection 12.5 g PRN    dextrose 50% injection 25 g PRN    glucagon (human recombinant) injection 1 mg PRN    glucose chewable tablet 16 g PRN    glucose chewable tablet 24 g PRN    insulin aspart U-100 pen 0-5 Units QID (AC + HS) PRN    [START ON 3/11/2025] pregabalin capsule 100 mg Every other day    sevelamer carbonate tablet 800 mg TID WM    trazodone split tablet 25 mg QHS    venlafaxine tablet 37.5 mg Daily    warfarin (COUMADIN) split tablet 1.25 mg Every Mon, Wed, Fri    [START ON 3/11/2025] warfarin (COUMADIN) tablet 2.5 mg Every Tues, Thurs, Sat, Sun     Family History    None       Tobacco Use    Smoking status: Former     Current packs/day: 0.50     Types: Cigarettes    Smokeless tobacco: Not on file   Substance and Sexual Activity    Alcohol use: Yes     Comment: rarely    Drug use: No    Sexual activity: Not Currently     Partners: Female     Review of Systems   Constitutional:  Positive for fatigue. Negative for fever.   Respiratory:  Negative for shortness of breath.    Cardiovascular:  Negative for chest pain.   Gastrointestinal:  Positive for blood in stool. Negative for diarrhea, nausea and vomiting.   Genitourinary:   Positive for decreased urine volume (2/2 ESRD).   Neurological:  Positive for weakness.     Objective:     Vital Signs (Most Recent):  Temp: 98.9 °F (37.2 °C) (03/10/25 1220)  Pulse: 79 (03/10/25 1220)  Resp: 18 (03/10/25 1220)  BP: (!) 84/0 (03/10/25 1220)  SpO2: 97 % (03/10/25 1220) Vital Signs (24h Range):  Temp:  [98.9 °F (37.2 °C)-99.7 °F (37.6 °C)] 98.9 °F (37.2 °C)  Pulse:  [62-87] 79  Resp:  [14-23] 18  SpO2:  [97 %-100 %] 97 %  BP: ()/(0-96) 84/0        There is no height or weight on file to calculate BMI.  There is no height or weight on file to calculate BSA.    No intake/output data recorded.     Physical Exam  Vitals and nursing note reviewed.   Constitutional:       General: He is not in acute distress.  Eyes:      Conjunctiva/sclera: Conjunctivae normal.   Pulmonary:      Effort: Pulmonary effort is normal. No respiratory distress.   Abdominal:      General: There is distension.   Musculoskeletal:      Right lower leg: No edema.      Left lower leg: No edema.   Skin:     General: Skin is warm and dry.   Neurological:      General: No focal deficit present.      Mental Status: He is alert.   Psychiatric:         Mood and Affect: Mood normal.          Significant Labs:  CBC:   Recent Labs   Lab 03/10/25  1139   WBC 8.77   RBC 3.94*   HGB 10.5*   HCT 34.6*      MCV 88   MCH 26.6*   MCHC 30.3*     CMP:   Recent Labs   Lab 03/10/25  1139   GLU 98   CALCIUM 8.4*   ALBUMIN 2.2*   PROT 7.0   *   K 4.9   CO2 23   CL 99   BUN 62*   CREATININE 8.2*   ALKPHOS 151*   *   *   BILITOT 0.6     All labs within the past 24 hours have been reviewed.

## 2025-03-10 NOTE — PROGRESS NOTES
03/10/2025  Monet Aguiar    Current provider:  Walter Mcintyre MD    Device interrogation:      3/10/2025    12:00 PM 3/10/2025    10:16 AM 1/30/2025     8:35 AM 12/19/2024    10:41 AM 11/21/2024    10:30 AM 10/24/2024    10:09 AM 10/16/2024     7:43 PM   TXP LVAD INTERROGATIONS   Type HeartMate3 HeartMate3     HeartMate3   Flow 3.1 3     4.1   Speed 5000 5000     5000   PI 8.4 8.8     6   Power ([place]) 3.5 3.4     3.6   LSL 4600 4600        Pulsatility Intermittent pulse Pulse Intermittent pulse No Pulse Pulse No Pulse Intermittent pulse          Rounded on [facility] to ensure all mechanical assist device settings (IABP or VAD) were appropriate and all parameters were within limits.  I was able to ensure all back up equipment was present, the staff had no issues, and the Perfusion Department daily rounding was complete.      For implantable VADs: Interrogation of Ventricular assist device was performed with analysis of device parameters and review of device function. I have personally reviewed the interrogation findings and agree with findings as stated.     In emergency, the nursing units have been notified to contact the perfusion department either by:  Calling [phone] from 630am to 4pm Mon thru Fri, utilizing the On-Call Finder functionality of Epic and searching for Perfusion, or by contacting the hospital  from 4pm to 630am and on weekends and asking to speak with the perfusionist on call.    1:58 PM

## 2025-03-10 NOTE — ED PROVIDER NOTES
SCRIBE #1 NOTE: I, Pa Urbna, am scribing for, and in the presence of, Osorio Sepulveda MD. I have scribed the HPI/ROS/PEx.    SCRIBE #2 NOTE: I, Rebeca Beverly, am scribing for, and in the presence of,  Jorge Noguera Jr., MD. I have scribed the remaining portions of the note not scribed by Scribe #1.      History     Chief Complaint   Patient presents with    Fatigue     LVAD pt. Pt called 911 for weakness and streaked bloody stools. Pt also reports lower back pain, dry cough, and congestion. Denies abdominal pain. Pt reports he fell on Tuesday and hit his head. -LOC, +blood thinners      Review of patient's allergies indicates:  No Known Allergies      History of Present Illness     HPI    3/9/2025, 7:47 PM  History obtained from the patient, medical records, and wife      History of Present Illness: Radha Abbott is a 64 y.o. male patient with a PMHx of T2DM, stage 4 CKD, PAF, ICD, CHF, CAD, aspiration pneumonia of both lungs, and MI who presents to the Emergency Department for evaluation of fatigue which began earlier today. Pt has LVAD in place and wife reports it went off twice today. Wife reports LVAD displayed PI of 11 and flow of 2.5 earlier today. Pt had the LVAD placed 12/1/2023 and last had it checked 2 months ago. Pt is in Mon/Wed/Fri dialysis and last had dialyzed 2 days ago. Associated sxs include HTN, decreased appetite for the last 2-3 weeks, bloody stool, and 1 episode of weakness while urinating at 2:30 today. Wife reports pt's normal blood pressure is 90/60. Wife reports pt also fell twice: first time 5 days ago, and second time earlier today. Wife reports pt hit is head during his fall today. Symptoms are constant and moderate in severity. No mitigating or exacerbating factors reported. Wife denies any fever, nausea, or emesis from pt. Wife reports pt is on fluid restriction diet and has been doing well with. Prior Tx includes 1 dose of Hydroxyzine 25 mg as instructed by LVAD team over  the phone.  No further complaints or concerns at this time.       Arrival mode: Personal Transportation    PCP: Vasu Kong MD        Past Medical History:  Past Medical History:   Diagnosis Date    Aspiration pneumonia of both lungs 06/17/2024    CAD (coronary artery disease)     CHF (congestive heart failure)     Delirium 06/16/2024    Diabetes mellitus     HFrEF (heart failure with reduced ejection fraction)     Hypoglycemia 06/12/2024    ICD (implantable cardioverter-defibrillator) in place     LVAD (left ventricular assist device) present 12/01/2023    MI, old     PAF (paroxysmal atrial fibrillation) 10/11/2023    Stage 4 chronic kidney disease 02/15/2024    Type 2 diabetes mellitus without complication, without long-term current use of insulin 10/07/2023       Past Surgical History:  Past Surgical History:   Procedure Laterality Date    ANGIOPLASTY-VENOUS ARTERY Right 12/1/2023    Procedure: ANGIOPLASTY-VENOUS ARTERY, RIGHT FEMORAL;  Surgeon: Yuri Washington MD;  Location: [facility] OR [address] [place] [address];  Service: Cardiovascular;  Laterality: Right;    AORTIC VALVULOPLASTY N/A 12/1/2023    Procedure: REPAIR, AORTIC VALVE;  Surgeon: Yuri Washington MD;  [facility];  Service: Cardiovascular;  Laterality: N/A;    CARDIAC SURGERY      CLOSURE N/A 12/1/2023    Procedure: CLOSURE, TEMPORARY;  Surgeon: Yuri Washington MD;  [facility];  Service: Cardiovascular;  Laterality: N/A;    DRAINAGE OF PLEURAL EFFUSION  12/4/2023    Procedure: DRAINAGE, PLEURAL EFFUSION;  Surgeon: Yuri Washington MD;  [facility];  Service: Cardiovascular;;    INSERTION OF GRAFT TO PERICARDIUM  12/4/2023    Procedure: INSERTION, GRAFT, PERICARDIUM;  Surgeon: Yuri Washington MD;  [facility]: [facility];  Service: Cardiovascular;;    INSERTION OF INTRA-AORTIC BALLOON ASSIST DEVICE Right 11/21/2023    Procedure: INSERTION, INTRA-AORTIC BALLOON PUMP;  Surgeon: Finn Cohn MD;  Location: [facility] CATH  LAB;  Service: Cardiology;  Laterality: Right;    LEFT VENTRICULAR ASSIST DEVICE Left 12/1/2023    Procedure: INSERTION-LEFT VENTRICULAR ASSIST DEVICE;  Surgeon: Yuri Washington MD;  [facility];  Service: Cardiovascular;  Laterality: Left;  REDO STERNOTOMY - REDO SAW NEEDED FOR CASE    LYSIS OF ADHESIONS  12/1/2023    Procedure: LYSIS, ADHESIONS;  Surgeon: Yuri Washington MD;  [facility];  Service: Cardiovascular;;    PLACEMENT OF SWAN ROLANDO CATHETER WITH IMAGING GUIDANCE  11/20/2023    Procedure: INSERTION, CATHETER, SWAN-ROLANDO, WITH IMAGING GUIDANCE;  Surgeon: Sajan Hurley MD;  Location: [facility] CATH LAB;  Service: Cardiology;;    REMOVAL OF TUNNELED CENTRAL VENOUS CATHETER (CVC) N/A 3/1/2024    Procedure: REMOVAL, CATHETER, CENTRAL VENOUS, TUNNELED;  Surgeon: Seble Aguilar MD;  Location: [facility] CATH LAB;  Service: Interventional Nephrology;  Laterality: N/A;    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 12/1/2023    Procedure: REPAIR, ANEURYSM, ARTERY, FEMORAL;  Surgeon: Yuri Washington MD;  [facility];  Service: Cardiovascular;  Laterality: Right;  Right Femoral Artery Repair    RIGHT HEART CATHETERIZATION Right 10/10/2023    Procedure: INSERTION, CATHETER, RIGHT HEART;  Surgeon: Bin Gandhi MD;  Location: [facility] CATH LAB;  Service: Cardiology;  Laterality: Right;    RIGHT HEART CATHETERIZATION Right 10/13/2023    Procedure: INSERTION, CATHETER, RIGHT HEART;  Surgeon: Walter Mcintyre MD;  Location: [facility] CATH LAB;  Service: Cardiology;  Laterality: Right;    RIGHT HEART CATHETERIZATION  11/13/2023    RIGHT HEART CATHETERIZATION Right 11/13/2023    Procedure: INSERTION, CATHETER, RIGHT HEART;  Surgeon: Juventino Bermudez Jr., MD;  Location: [facility] CATH LAB;  Service: Cardiology;  Laterality: Right;    RIGHT HEART CATHETERIZATION Right 11/20/2023    Procedure: INSERTION, CATHETER, RIGHT HEART;  Surgeon: Sajan Hurley MD;  Location: [facility] CATH LAB;   Service: Cardiology;  Laterality: Right;    RIGHT HEART CATHETERIZATION Right 1/22/2024    Procedure: INSERTION, CATHETER, RIGHT HEART;  Surgeon: Brayan Ocampo MD;  Location: [facility] CATH LAB;  Service: Cardiology;  Laterality: Right;    STERNAL WOUND CLOSURE N/A 12/4/2023    Procedure: CLOSURE, WOUND, STERNUM;  Surgeon: Yuri Washington MD;  [facility];  Service: Cardiovascular;  Laterality: N/A;    STERNOTOMY N/A 12/1/2023    Procedure: STERNOTOMY, REDO;  Surgeon: Yuri Washington MD;  [facility];  Service: Cardiovascular;  Laterality: N/A;    VALVULOPLASTY, MITRAL VALVE N/A 12/1/2023    Procedure: VALVULOPLASTY, MITRAL VALVE;  Surgeon: Yuri Washington MD;  Location: [facility] OR [address] [place] [address];  Service: Cardiovascular;  Laterality: N/A;         Family History:  No family history on file.    Social History:  Social History     Tobacco Use    Smoking status: Former     Current packs/day: 0.50     Types: Cigarettes    Smokeless tobacco: Not on file   Substance and Sexual Activity    Alcohol use: Yes     Comment: rarely    Drug use: No    Sexual activity: Not Currently     Partners: Female        Review of Systems     Review of Systems   Constitutional:  Positive for appetite change (decreased) and fatigue. Negative for fever.   HENT:  Negative for sore throat.    Respiratory:  Negative for shortness of breath.    Cardiovascular:  Negative for chest pain.        (+) HTN   Gastrointestinal:  Positive for blood in stool. Negative for nausea and vomiting.   Genitourinary:  Negative for dysuria.   Musculoskeletal:  Negative for back pain.   Skin:  Negative for rash.   Neurological:  Positive for weakness.   Hematological:  Does not bruise/bleed easily.   All other systems reviewed and are negative.       Physical Exam     Initial Vitals [03/09/25 1905]   BP Pulse Resp Temp SpO2   137/62 62 18 99.7 °F (37.6 °C) 99 %      MAP       --          Physical Exam  Nursing Notes and Vital Signs  Reviewed.  Constitutional: Patient is in no apparent distress. Chronically ill and thin appearing.  Head: Atraumatic. Normocephalic.  Eyes: PERRL. EOM intact. Conjunctivae are not pale. No scleral icterus.  ENT: Mucous membranes are moist. Oropharynx is clear and symmetric.    Neck: Supple. Full ROM. No lymphadenopathy.  Cardiovascular: Pt has LVAD in place.   Pulmonary/Chest: No respiratory distress. Clear to auscultation bilaterally. No wheezing or rales.  Abdominal: Soft and non-distended.  There is no tenderness.  No rebound, guarding, or rigidity. Good bowel sounds.  Genitourinary: No CVA tenderness.  Musculoskeletal: Moves all extremities. No obvious deformities. No edema. No calf tenderness.  Skin: Warm and dry.  Neurological:  Alert, awake, and appropriate.  Normal speech.  No acute focal neurological deficits are appreciated.  Psychiatric: Flat affect.    ED Course   Procedures  ED Vital Signs:  Vitals:    03/09/25 1941 03/09/25 2051 03/09/25 2103 03/09/25 2203   BP: (!) 127/96 109/71 116/88 116/82   Pulse: 86 85 87 86   Resp: (!) 23 20 19 17   Temp:   99.2 °F (37.3 °C)    TempSrc:   Oral    SpO2:  97% 100% 100%   Weight:       Height:        03/09/25 2233 03/09/25 2316 03/10/25 0103 03/10/25 0133   BP: (!) 115/91 110/81 117/82 105/81   Pulse: 84 85 85 83   Resp: 20 20 19 19   Temp:       TempSrc:       SpO2: 100% 100% 99% 100%   Weight:       Height:        03/10/25 0224 03/10/25 0227 03/10/25 0327 03/10/25 0425   BP:  109/77     Pulse: 84 84 81 83   Resp: 20 14 19 20   Temp:       TempSrc:       SpO2: 98% 97% 98% 98%   Weight:       Height:        03/10/25 0522 03/10/25 0604 03/10/25 0745   BP:  104/83    Pulse: 73 86 76   Resp: 18 19 19   Temp:  98.9 °F (37.2 °C)    TempSrc:  Oral    SpO2: 98% 99% 99%   Weight:      Height:          Abnormal Lab Results:  Labs Reviewed   CBC W/ AUTO DIFFERENTIAL - Abnormal       Result Value    WBC 8.07      RBC 4.28 (*)     Hemoglobin 11.3 (*)     Hematocrit 37.3 (*)      MCV 87      MCH 26.4 (*)     MCHC 30.3 (*)     RDW 17.0 (*)     Platelets 189      MPV 11.0      Immature Granulocytes 1.0 (*)     Gran # (ANC) 6.9      Immature Grans (Abs) 0.08 (*)     Lymph # 0.6 (*)     Mono # 0.4      Eos # 0.1      Baso # 0.03      nRBC 0      Gran % 85.9 (*)     Lymph % 6.9 (*)     Mono % 4.8      Eosinophil % 1.0      Basophil % 0.4      Platelet Estimate Clumped (*)     Aniso Slight      Poik Slight      Ovalocytes Occasional      Large/Giant Platelets Present      Differential Method Automated     COMPREHENSIVE METABOLIC PANEL - Abnormal    Sodium 131 (*)     Potassium 5.0      Chloride 97      CO2 19 (*)     Glucose 146 (*)     BUN 52 (*)     Creatinine 8.5 (*)     Calcium 8.9      Total Protein 8.0      Albumin 2.8 (*)     Total Bilirubin 0.6      Alkaline Phosphatase 161 (*)      (*)      (*)     eGFR 7 (*)     Anion Gap 15     TROPONIN I - Abnormal    Troponin I 0.094 (*)    B-TYPE NATRIURETIC PEPTIDE - Abnormal     (*)    APTT - Abnormal    aPTT 35.7 (*)    PROTIME-INR - Abnormal    Prothrombin Time 27.7 (*)     INR 2.6 (*)    TROPONIN I - Abnormal    Troponin I 0.101 (*)    OCCULT BLOOD X 1, STOOL - Abnormal    Occult Blood Positive (*)    POCT GLUCOSE - Abnormal    POCT Glucose 156 (*)    POCT GLUCOSE - Abnormal    POCT Glucose 116 (*)    INFLUENZA A & B BY MOLECULAR    Influenza A, Molecular Negative      Influenza B, Molecular Negative      Flu A & B Source Nasal swab     SARS-COV-2 RNA AMPLIFICATION, QUAL    SARS-CoV-2 RNA, Amplification, Qual Negative          All Lab Results:  Results for orders placed or performed during the hospital encounter of 03/09/25   Influenza A & B by Molecular    Collection Time: 03/09/25  7:12 PM    Specimen: Nasopharyngeal Swab   Result Value Ref Range    Influenza A, Molecular Negative Negative    Influenza B, Molecular Negative Negative    Flu A & B Source Nasal swab    COVID-19 Rapid Screening    Collection Time: 03/09/25   7:12 PM   Result Value Ref Range    SARS-CoV-2 RNA, Amplification, Qual Negative Negative   POCT glucose    Collection Time: 03/09/25  7:17 PM   Result Value Ref Range    POCT Glucose 156 (H) 70 - 110 mg/dL   EKG 12-lead    Collection Time: 03/09/25  7:44 PM   Result Value Ref Range    QRS Duration 180 ms    [facility] QTC Calculation 625 ms   CBC auto differential    Collection Time: 03/09/25  8:23 PM   Result Value Ref Range    WBC 8.07 3.90 - 12.70 K/uL    RBC 4.28 (L) 4.60 - 6.20 M/uL    Hemoglobin 11.3 (L) 14.0 - 18.0 g/dL    Hematocrit 37.3 (L) 40.0 - 54.0 %    MCV 87 82 - 98 fL    MCH 26.4 (L) 27.0 - 31.0 pg    MCHC 30.3 (L) 32.0 - 36.0 g/dL    RDW 17.0 (H) 11.5 - 14.5 %    Platelets 189 150 - 450 K/uL    MPV 11.0 9.2 - 12.9 fL    Immature Granulocytes 1.0 (H) 0.0 - 0.5 %    Gran # (ANC) 6.9 1.8 - 7.7 K/uL    Immature Grans (Abs) 0.08 (H) 0.00 - 0.04 K/uL    Lymph # 0.6 (L) 1.0 - 4.8 K/uL    Mono # 0.4 0.3 - 1.0 K/uL    Eos # 0.1 0.0 - 0.5 K/uL    Baso # 0.03 0.00 - 0.20 K/uL    nRBC 0 0 /100 WBC    Gran % 85.9 (H) 38.0 - 73.0 %    Lymph % 6.9 (L) 18.0 - 48.0 %    Mono % 4.8 4.0 - 15.0 %    Eosinophil % 1.0 0.0 - 8.0 %    Basophil % 0.4 0.0 - 1.9 %    Platelet Estimate Clumped (A)     Aniso Slight     Poik Slight     Ovalocytes Occasional     Large/Giant Platelets Present     Differential Method Automated    Comprehensive metabolic panel    Collection Time: 03/09/25  8:23 PM   Result Value Ref Range    Sodium 131 (L) 136 - 145 mmol/L    Potassium 5.0 3.5 - 5.1 mmol/L    Chloride 97 95 - 110 mmol/L    CO2 19 (L) 23 - 29 mmol/L    Glucose 146 (H) 70 - 110 mg/dL    BUN 52 (H) 8 - 23 mg/dL    Creatinine 8.5 (H) 0.5 - 1.4 mg/dL    Calcium 8.9 8.7 - 10.5 mg/dL    Total Protein 8.0 6.0 - 8.4 g/dL    Albumin 2.8 (L) 3.5 - 5.2 g/dL    Total Bilirubin 0.6 0.1 - 1.0 mg/dL    Alkaline Phosphatase 161 (H) 40 - 150 U/L     (H) 10 - 40 U/L     (H) 10 - 44 U/L    eGFR 7 (A) >60 mL/min/1.73 m^2    Anion Gap 15 8 - 16  mmol/L   Troponin I #1    Collection Time: 03/09/25  8:23 PM   Result Value Ref Range    Troponin I 0.094 (H) 0.000 - 0.026 ng/mL   BNP    Collection Time: 03/09/25  8:23 PM   Result Value Ref Range     (H) 0 - 99 pg/mL   APTT    Collection Time: 03/09/25  8:23 PM   Result Value Ref Range    aPTT 35.7 (H) 21.0 - 32.0 sec   Protime-INR    Collection Time: 03/09/25  8:23 PM   Result Value Ref Range    Prothrombin Time 27.7 (H) 9.0 - 12.5 sec    INR 2.6 (H) 0.8 - 1.2   POCT glucose    Collection Time: 03/09/25  9:57 PM   Result Value Ref Range    POCT Glucose 116 (H) 70 - 110 mg/dL   Troponin I #2    Collection Time: 03/09/25 11:14 PM   Result Value Ref Range    Troponin I 0.101 (H) 0.000 - 0.026 ng/mL   Occult blood x 1, stool    Collection Time: 03/10/25  1:23 AM    Specimen: Stool   Result Value Ref Range    Occult Blood Positive (A) Negative       Imaging Results:  Imaging Results              CT Head Without Contrast (Final result)  Result time 03/09/25 20:09:11      Final result by Anthony Key MD (03/09/25 20:09:11)                   Impression:      1.  Right parietal occipital scalp hematoma without underlying skull fracture.  2.  No intracranial hemorrhage or acute intracranial process.  3.  Moderate cerebral atrophy out of proportion to the patient's age.    All CT scans at [this location] are performed using dose modulation techniques as appropriate to a performed exam including the following:  Automated exposure control; adjustment of the mA and/or kV according to patient size (this includes techniques or standardized protocols for targeted exams where dose is matched to indication / reason for exam; i.e. extremities or head); use of iterative reconstruction technique.    Finalized on: 3/9/2025 8:09 PM By:  Anthony Key MD  [facility]# [id number]      2025-03-09 20:11:20.232     [facility]               Narrative:    EXAM: CT HEAD WITHOUT CONTRAST    CLINICAL HISTORY: Head injury    COMPARISON:  None.    TECHNIQUE: The head was scanned without intravenous contrast.    FINDINGS: No skull fracture.  There is a right parieto-occipital small scalp hematoma.  Mastoid air cells are clear.  Paranasal sinuses are clear.    No midline shift or hydrocephalus.  Moderate cerebral atrophy out of portion to the patient's age of 64 years.  No mass lesion or extra-axial fluid collection or cerebral edema or intracranial hemorrhage.                                         X-Ray Chest AP Portable (Final result)  Result time 03/09/25 20:05:23      Final result by Anthony Key MD (03/09/25 20:05:23)                   Impression:     See above    Finalized on: 3/9/2025 8:05 PM By:  Anthony Key MD  [facility]# [id number]      2025-03-09 20:07:30.004     [facility]               Narrative:    EXAM: XR CHEST AP PORTABLE    CLINICAL HISTORY: Chest pain    FINDINGS:  Comparison made to chest x-ray from earlier today.  Dual lumen right IJ central venous catheter tip at the cavoatrial junction.  Left-sided single lead cardiac defibrillator.  Ventricular assist device noted.    Platelike atelectasis and/or linear scarring in the left midlung field.  No consolidation.  Possible trace left pleural effusion.  No pneumothorax.  No pulmonary edema.                                         X-Ray Chest 1 View (Final result)  Result time 03/09/25 19:48:25      Final result by Kemar Simon MD (03/09/25 19:48:25)                   Narrative:    EXAM:    XR CHEST 1 VIEW    CLINICAL HISTORY:    Shortness of breath    FINDINGS: Median sternotomy.  Pacemaker with battery in the left chest and upper abdominal region.  Right-sided dual-lumen central venous catheter.  Cardiomegaly.  Mild perihilar edema.  Correlate clinically to stable CHF    IMPRESSION:     As above    Finalized on: 3/9/2025 7:48 PM By:  Kemar Simon MD SAQIB PhD  [facility]# [id number]      2025-03-09 19:50:28.989     [facility]                                     The EKG was ordered, reviewed, and  independently interpreted by the ED provider.  Interpretation time: 19:44  Rate: 87 BPM  Rhythm:  Sinus rhythm with AV dissociation and Wide QRS rhythm  Interpretation: Left axis deviation. Nonspecific intraventricular block. Inferior infarct, age undetermined. No STEMI.         The Emergency Provider reviewed the vital signs and test results, which are outlined above.     ED Discussion     8:00 PM: Dr. Sepulveda transfers care of patient to Dr. Noguera pending lab and radiology results.    8:13 PM: Re-evaluated pt at bedside. Patient is resting comfortably and is in no acute distress.  Discussed with pt and/or family/caretaker all pertinent results. Discussed with pt and/or family/caretaker any concerns expressed at this time. Answered all questions. Pt and/or family/caretaker express understanding at this time. Dr. Noguera agrees with Dr. Sepulveda's assessment and plan of care.     12:46 AM: Consult with Dr. Mcintyre (Cardiology) at [facility] concerning pt. He recommends transfer for heart failure and fall. There are no cardiothoracic surgery services, which the patient requires, offered at [facility] at this time. Dr. Mcintyre expresses understanding and will accept transfer.  Accepting Facility: [facility]  Accepting Physician: Dr. Mcintyre    12:52 AM: Re-evaluated pt. Informed patient/family/caretaker that there are no cardiothoracic surgery services available at this time. I have discussed test results, shared treatment plan, and the need for transfer with patient and family at bedside. All historical, clinical, radiographic, and laboratory findings were reviewed with the patient/family/caretaker in detail. Patient will be transferred by [facility] services with cardiac care required en route. Patient understands that there could be unforeseen motor vehicle accidents or loss of vital signs that could result in potential death or permanent disability. Pt and/or family/caretaker express understanding at  this time and agree with all information. All questions answered. Pt and/or family/caretaker have no further questions or concerns at this time. Pt is ready for transfer.      Medical Decision Making  Amount and/or Complexity of Data Reviewed  Labs: ordered. Decision-making details documented in ED Course.  Radiology: ordered. Decision-making details documented in ED Course.  ECG/medicine tests: ordered and independent interpretation performed. Decision-making details documented in ED Course.    Risk  OTC drugs.  Prescription drug management.  Parenteral controlled substances.  Decision regarding hospitalization.  Risk Details: OTC drugs, prescription drugs and controlled substances considered.  Due to patient's symptoms improving and pain controlled pain medications ordered appropriately.  Differential diagnoses:  CVA, infection, pneumonia, urinary tract infection, intra-abdominal pathology, renal failure, dehydration, electrolyte abnormality or metabolic cause y, drug affect, hyperglycemia hypoglycemia, drug drug interaction, psychiatric illness, meningitis encephalitis, seizure, vasculitis, heart failure, arrhythmia, endocarditis, issues c LVAD, trauma among others                  ED Medication(s):  Medications - No data to display    Discharge Medication List as of 3/10/2025  8:26 AM                  Scribe Attestation:   Scribe #1: I performed the above scribed service and the documentation accurately describes the services I performed. I attest to the accuracy of the note.     Attending:   Physician Attestation Statement for Scribe #1: IDo Thuytien Wendy, MD, personally performed the services described in this documentation, as scribed by Pa Urban, in my presence, and it is both accurate and complete.       Scribe Attestation:   Scribe #2: I performed the above scribed service and the documentation accurately describes the services I performed. I attest to the accuracy of the note.    Attending  Attestation:           Physician Attestation for Scribe:    Physician Attestation Statement for Scribe #2: I, Jorge Noguera Jr., MD, reviewed documentation, as scribed by Rebeca Beverly in my presence, and it is both accurate and complete. I also acknowledge and confirm the content of the note done by Scribe #1.           Clinical Impression       ICD-10-CM ICD-9-CM   1. Heart failure, unspecified HF chronicity, unspecified heart failure type  I50.9 428.9   2. Weakness  R53.1 780.79   3. Chest pain  R07.9 786.50   4. Fall, initial encounter  W19.XXXA E888.9   5. Contusion of scalp, initial encounter  S00.03XA 920   6. History of warfarin therapy  Z92.29 V58.61   7. LVAD (left ventricular assist device) present  Z95.811 V43.21   8. ESRD (end stage renal disease) on dialysis  N18.6 585.6    Z99.2 V45.11   9. Elevated troponin  R79.89 790.6   10. Elevated brain natriuretic peptide (BNP) level  R79.89 790.99       Disposition:   Disposition: Transferred  Condition: Fair        Jorge Noguera Jr., MD  03/12/25 0219

## 2025-03-10 NOTE — PHARMACY MED REC
"Admission Medication History     The home medication history was taken by Ana Green.    You may go to "Admission" then "Reconcile Home Medications" tabs to review and/or act upon these items.     The home medication list has been updated by the Pharmacy department.   Please read ALL comments highlighted in yellow.   Please address this information as you see fit.    Feel free to contact us if you have any questions or require assistance.      The medications listed below were removed from the home medication list. Please reorder if appropriate:  Patient reports no longer taking the following medication(s):  Gabapentin 300 mg  Lovaza 1 gram   Roxicodone 5 mg IR  Protonix 40 mg  Pericolace 8.5-50 mg    Medications listed below were obtained from: Patient/family and Analytic software- [email or web address]      [facility] REC COMPLETED:     Ana Green  [id number]    Current Outpatient Medications on File Prior to Encounter   Medication Sig Dispense Refill Last Dose/Taking    amiodarone (PACERONE) 400 MG tablet Take 1 tablet (400 mg total) by mouth once daily. 90 tablet 3 3/9/2025    atorvastatin (LIPITOR) 40 MG tablet Take 1 tablet (40 mg total) by mouth every evening. 90 tablet 3 3/8/2025    loratadine (CLARITIN) 10 mg tablet Take 10 mg by mouth once daily.   3/8/2025    omega 3-dha-epa-fish oil (FISH OIL) 1,000 (120-180) mg Cap Take 1 capsule by mouth once daily.   3/8/2025    pregabalin (LYRICA) 100 MG capsule Take 1 capsule by mouth every other day.   Past Week    sevelamer carbonate (RENVELA) 800 mg Tab Take 800 mg by mouth 3 (three) times daily with meals.   3/9/2025    venlafaxine (EFFEXOR) 37.5 MG Tab Take 1 tablet by mouth once daily 90 tablet 3 3/9/2025    vitamin D (VITAMIN D3) 1000 units Tab Take 1 tablet by mouth once daily 30 tablet 0 3/9/2025    warfarin (COUMADIN) 2.5 MG tablet TAKE 1 & 1/2 (ONE & ONE-HALF) TABLETS BY MOUTH ONCE DAILY EXCEPT  MONDAY WEDNESDAY  AND  FRIDAY  OR  AS  DIRECTED  BY  COUMADIN  " CLINIC 45 tablet 0 3/8/2025    pulse oximeter (PULSE OXIMETER) device by Apply Externally route 2 (two) times a day. Use twice daily at 8 AM and 3 PM and record the value in MyChart as directed. 1 each 0     traZODone (DESYREL) 50 MG tablet Take 0.5 tablets (25 mg total) by mouth every evening. 45 tablet 3 Unknown                           .

## 2025-03-10 NOTE — PROGRESS NOTES
03/10/25 1555   Vital Signs   Temp 97.7 °F (36.5 °C)   Temp Source Axillary   Pulse 80   Heart Rate Source Monitor   Resp 16   SpO2 96 %   Device (Oxygen Therapy) room air   BP (!) 78/0   BP Location Right arm   BP Method Doppler   Patient Position Lying     Arrived in room 3095,pt  is resting and arousable.  POC discussed with the pt.Pt is stable for tx.  Bedside HD 1:1 tx initiated aseptically via RIJ TDC with good flow.

## 2025-03-10 NOTE — ASSESSMENT & PLAN NOTE
- most likely secondary to known hemorrhoids.   - Hgb stable and bleeding resolved.   - Will continue to monitor for now.

## 2025-03-10 NOTE — NURSING
Pt's VAD dressing changed per protocol using silver kit and sterile technique. Pt's drive line site is clean, dry, intact with no drainage or signs of infection; DLES is + (1). No kinks or frays on drive line, secured with tape per pt' request. Pt tolerated dressing change well. Next dressing change due 03/15/2025.

## 2025-03-10 NOTE — NURSING
Notified  and Anil LVAD coordinator pt arrived to room 3095,  came at bedside assessed pt and pt c/o left hip area pain when moving.

## 2025-03-10 NOTE — NURSING
Notified Ac.MD INR 3.0, ok to give warfarin per Ac.MD. PT is getting dialysis right now, ok to hold warfarin for now until dialysis done per Ac.MD .

## 2025-03-10 NOTE — NURSING
Pt's dialysis catheter dressing changed per protocol using kit and sterile technique. Pt's dialysis line site is clean, dry, suture  intact with no drainage or signs of infection. Pt tolerated dressing change well. Next dressing change due 03/17/2025.

## 2025-03-10 NOTE — NURSING TRANSFER
Nursing Transfer Note      3/10/2025   1010    Reason patient is being transferred: Weakness and fell    Transfer To: room 3095    Transfer via stretcher    Transfer with cardiac monitoring    Transported by EMS    Telemetry: Box Number [id number]  Order for Tele Monitor? Yes    Any special needs or follow-up needed: LVAD    Patient belongings transferred with patient: Yes    Chart send with patient: Yes    Notified: spouse    Patient reassessed at: 1010 03/10/2025    Upon arrival to floor: cardiac monitor applied, patient oriented to room, call bell in reach, and bed in lowest position

## 2025-03-10 NOTE — ASSESSMENT & PLAN NOTE
Procedure: Device Interrogation Including analysis of device parameters  Current Settings: Ventricular Assist Device  Review of device function is stable/unstable stable  Anticoagulation w/ warfarin. Goal INR 1-2.   Has some LFA in VAD history but most in the setting of either isolated HTN or dialysis session. Will monitor for now.         3/10/2025    10:16 AM 1/30/2025     8:35 AM 12/19/2024    10:41 AM 11/21/2024    10:30 AM 10/24/2024    10:09 AM 10/16/2024     7:43 PM 9/26/2024    10:21 AM   TXP LVAD INTERROGATIONS   Type HeartMate3     HeartMate3    Flow 3     4.1    Speed 5000     5000    PI 8.8     6    Power (Carrington) 3.4     3.6    Pulsatility  Intermittent pulse No Pulse Pulse No Pulse Intermittent pulse No Pulse

## 2025-03-10 NOTE — ASSESSMENT & PLAN NOTE
64 year old male hx of ESRD on HD MWF. Last HD Friday 3/7/25. Nephrology consulted for management of ESRD.     Nephrology History  -Outpatient HD unit: [facility]  -Nephrologist: ?  -HD tx days: MWF  -HD prescription: 3 hrs, F160, 350/800  -Last HD tx: 3/7/25  -HD access: R CVC  -HD modality: iHD  -Residual urine: minimal  -EDW:  81 kg    Plan/Recommendations  ESRD on HD:  -No need for emergent RRT, electrolytes stable. Plan for HD this evening/overnight to resume MWF schedule pending HD nurse availability, need to triage patients.   -Will continue iHD thrice weekly unless emergent indication arises  -Strict I&Os  -Pre & post HD weight  Anemia in ESRD  -Hgb goal 10-11, hgb at goal  Mineral Bone Disease in ESRD  -Continue phos binders  -Renal diet if not NPO

## 2025-03-10 NOTE — PROGRESS NOTES
03/10/25 1810 03/10/25 1812 03/10/25 1813   Device   Flow 2.4 2.2 2.5   Speed 5000 5050 5000   PI 7.4 8.2 8.4   Power (Carrington) 3.2 3.3 3.3   LSL 4600 4650 4600   Pulsatility No Pulse No Pulse No Pulse     Notified Lilli.MD and Oscar.LVAD coordinator LFA x3 back to back, RN at bedside when all 3 happened, lowest flow was 2.0, asymptomatic, doppler was 58 at 1814, and dialysis RN stopped to remove fluids and gave 200 mL NS bolus, doppler is 70 after bolus given, time on controller and LVAD monitor history showing 1710 d/t time saving. Unable to get MAP. WCTM.

## 2025-03-10 NOTE — SUBJECTIVE & OBJECTIVE
Interval HPI:   No acute events overnight. Patient in room KHAE0862/VHMS8033 A. Blood glucose stable. BG at goal on current insulin regimen (SSI ). Steroid use- None .      Renal function- Abnormal -     Vasopressors-  None     Diet Cardiac Fluid - 1500mL; Standard Tray     Eatin%  Nausea: No  Hypoglycemia and intervention: No  Fever: No  TPN and/or TF: No     PMH, PSH, FH, SH updated and reviewed     ROS:       Review of Systems   Constitutional:  Negative for chills and fever.   Gastrointestinal:  Negative for vomiting.       Current Medications and/or Treatments Impacting Glycemic Control  Immunotherapy:    Immunosuppressants       None          Steroids:   Hormones (From admission, onward)      None          Pressors:    Autonomic Drugs (From admission, onward)      None          Hyperglycemia/Diabetes Medications:   Antihyperglycemics (From admission, onward)      None             PHYSICAL EXAMINATION:  Vitals:    03/10/25 1220   BP: (!) 84/0   Pulse: 79   Resp: 18   Temp: 98.9 °F (37.2 °C)     There is no height or weight on file to calculate BMI.     Physical Exam  Constitutional:       General: He is not in acute distress.  HENT:      Head: Normocephalic and atraumatic.   Pulmonary:      Effort: No respiratory distress.   Psychiatric:         Mood and Affect: Mood normal.         Behavior: Behavior normal.

## 2025-03-10 NOTE — ASSESSMENT & PLAN NOTE
BG goal: 140-180  Type 2 diabetes.  Blood sugar stable.  Monitor on correction.     - -Start Novolog correction dose with ISF 50 starting at 200    - POCT Glucose before meals and at bedtime  - Hypoglycemia protocol in place      ** Please notify Endocrine for any change and/or advance in diet**  ** Please call Endocrine for any BG related issues **     Discharge Planning:   TBD. Please notify endocrinology prior to discharge.

## 2025-03-10 NOTE — PROGRESS NOTES
Admit Note     Met with patient and pt's spouse to assess patients needs. Patient is a 64 y.o.  male admitted for HF and Fall. Pt received LVAD in 2023. Pt's spouse does pt's dressing changes.    Patient admitted on 3/10/2025. At this time, patient presents as alert and oriented x 4 and communicative. At this time, patients caregiver presents as alert and oriented x 4, good eye contact, cooperative, and asking and answering questions appropriately.      Household/Family Systems (as reported by patients caregiver)     Patient resides with his spouse. Support system includes pt's spouse and sister Teetee. Pt has 4 adult children from a previous relationship that resides in [place], [place] (20 mins away from pt). Pt's spouse also has an adult son from a previous relationship that resides in [place], [place]. Patient does not have dependents that are need of being cared for.    Pt's home address: [address] [place] [address].                                  [place] [address]    Pt's phone number: [phone]     Patients primary caregiver is spouse. Pt's spouse is employed at [employer] as a Pt Access Rep. Confirmed patients emergency contacts information as follow:    Arlyn Abbott, spouse, [phone]/M or [phone]/W    Teetee Zhou, sister, [phone] (retired and assist pt while spouse is at work)    During admission, patient's caregiver plans to stay in patient's room.  Confirmed patient and patients caregivers do have access to reliable transportation.    Cognitive Status/Learning     Patient reports his reading ability as 12th grade and states he does not have difficulty with reading, writing, seeing, hearing, comprehension, learning, and memory. Pt states he has bifocal eyeglasses. Patient reports he learns best by reading, watching, listening, and hands-on.   Needed: No.   Highest education level: High School (9-12) or GED    Vocation/Disability (as reported by patients  caregiver)    Working for Income: No  If no, reason not working: Disability  Pt is disabled due to HF since 2022. Prior to disability, pt was a self-employed .    Adherence     Patient reports he has a medium level of adherence to his health care regimen. Adherence counseling and education provided. Patient verbalizes understanding.    Substance Use    Patient and patients caregiver report patients substance usage as the following:    Tobacco:  Pt denies current use of tobacco.   Pt quit smoking cigarettes in 2022.  Alcohol:  Pt states he drinks socially with last use 3/2/25  .  Illicit Drugs/Non-prescribed Medications: none, patient denies any use.  Patient and patients caregiver states clear understanding of the potential impact of substance use.  Substance abstinence/cessation counseling, education and resources provided and reviewed.     Services Utilizing/ADLS (as reported by patients caregiver)    Infusion Service: Prior to admission, patient utilizing? no Utilized [facility] in the past  Home Health: Prior to admission, patient utilizing? no Utilized [facility] in the past  DME: Prior to admission, yes Pt has a RW, Rollator, WC, and BSC at home.  Pulmonary/Cardiac Rehab: Prior to admission, no  Dialysis:  Prior to admission, yes [facility] (6:30 am).  Transplant Specialty Pharmacy:  Prior to admission, no.    [facility]: [phone]  [facility]: [phone]  [facility]: [phone]   [phone]/F    Prior to admission, patients caregiver reports patient was independent with ADLS and was not driving. Pt was utilizing his WC pta due to weakness. Pt had a fall pta on 3/2/25. Patients caregiver reports patient is not able to care for self at this time due to compromised medical condition (as documented in medical record) and physical weakness..  Patients caregiver reports patient indicates a willingness to care for self once medically  cleared to do so.    Insurance/Medications    Insured by    Payer/Plan Subscr  Sex Relation Sub. Ins. ID Effective Group Num   1. MEDICAID - [place] BUCK 1960 Male Self [id number]                                   [address] [address]   2[facility] 1960 [facility] Self [id number] 10/9/23                                    PO         Primary Insurance (for UNOS reporting): Public Insurance - Medicaid  Secondary Insurance (for UNOS reporting): None    Patients caregiver reports patient is able to obtain and afford medications at this time and at time of discharge.    Living Will/Healthcare Power of     Patients caregiver reports patient does not have a LW and/or HCPA.   provided education regarding LW and HCPA and the completion of forms.    Coping/Mental Health (as reported by patients caregiver)    Patient is coping adequately with the aid of  family members. Pt's spouse reports that pt has a h/o depression. Pt reports he enjoys the outdoors to help him cope with depression. Pt denies SI and/or HI at  present and in the past. Patients caregiver is coping adequately with the aid of  family members.      Discharge Planning (as reported by patients caregiver)    At time of discharge, patient plans to return to his home under the care of spouse and his sister Teetee. Patients spouse will transport patient.  Per rounds today, expected discharge date has not been medically determined at this time. Patients caretaker verbalizes understanding and is involved in treatment planning and discharge process.    Additional Concerns    Patient's caretaker denies additional needs and/or concerns at this time. Patient is being followed for needs, education, resources, information, emotional support, supportive counseling, and for supportive and skilled discharge plan of care.  providing ongoing psychosocial support, education, resources and d/c  planning as needed.  SW remains available. Patient's caregiver verbalizes understanding and agreement with information reviewed,  availability and how to access available resources as needed. Patient denies additional needs and/or concerns at this time. Patient verbalizes understanding and agreement with information reviewed, social work availability, and how to access available resources as needed.

## 2025-03-10 NOTE — HPI
RReason for Consult: Management of T2DM, Hyperglycemia      Surgical Procedure and Date: HM3 placement as DT (12/1/23)      Diabetes diagnosis year: several years ago     Home Diabetes Medications:   Currently not taking meds       How often checking glucose at home? Once daily in the AM    BG readings on regimen: 70-130s  Hypoglycemia on the regimen?  No  Missed doses on regimen?  No     Diabetes Complications include:     Hypoglycemia , Diabetic nephropathy  , Diabetic chronic kidney disease     , and Diabetic peripheral neuropathy      Complicating diabetes co morbidities:   CHF and CKD        HPI:   Patient is a 64 y.o. male  PMHx of stage D HFrEF, ICM s/p HM3 as DT (12/1/2023), ESRD on HD (MWF), and debility who presented to ED in [place] w/ complaints of  falls x2. States when he leans over at 45 degree angle he is not able to lift himself back up leading to his falls. Endocrine consulted for BG management

## 2025-03-10 NOTE — ED NOTES
Yanet from [facility] called for pt status update. States to call [phone] about any change in pt status. MD notified

## 2025-03-10 NOTE — ASSESSMENT & PLAN NOTE
- Appears to MSK related but will order abdominal U/S for further evaluation.   - PT consulted. Appreciate recommendations.

## 2025-03-10 NOTE — HPI
Radha Abbott is a 64 y.o. male patient with a PMHx of T2DM, stage 4 CKD, PAF, ICD, CHF, CAD, aspiration pneumonia of both lungs, and MI who presents to the Emergency Department for evaluation of fatigue which began earlier today. Pt has LVAD in place and wife reports it went off twice today. Wife reports LVAD displayed PI of 11 and flow of 2.5 earlier today. Pt had the LVAD placed 12/1/2023 and last had it checked 2 months ago. Pt is in Mon/Wed/Fri dialysis and last had dialyzed 2 days ago. Associated sxs include HTN, decreased appetite for the last 2-3 weeks, bloody stool, and 1 episode of weakness while urinating at 2:30 today. Wife reports pt's normal blood pressure is 90/60. Wife reports pt also fell twice: first time 5 days ago, and second time earlier today. Wife reports pt hit is head during his fall today. Symptoms are constant and moderate in severity. No mitigating or exacerbating factors reported. Wife denies any fever, nausea, or emesis from pt. Wife reports pt is on fluid restriction diet and has been doing well with. Prior Tx includes 1 dose of Hydroxyzine 25 mg as instructed by LVAD team over the phone.  Patient was transferred to [facility] for further evaluation of LVAD. Nephrology consulted for ESRD management.

## 2025-03-11 ENCOUNTER — PATIENT MESSAGE (OUTPATIENT)
Dept: TRANSPLANT | Facility: CLINIC | Age: 65
End: 2025-03-11
Payer: MEDICAID

## 2025-03-11 LAB
ANION GAP SERPL CALC-SCNC: 13 MMOL/L (ref 8–16)
APTT PPP: 40 SEC (ref 21–32)
BASOPHILS # BLD AUTO: 0.02 K/UL (ref 0–0.2)
BASOPHILS NFR BLD: 0.3 % (ref 0–1.9)
BUN SERPL-MCNC: 36 MG/DL (ref 8–23)
CALCIUM SERPL-MCNC: 8.3 MG/DL (ref 8.7–10.5)
CHLORIDE SERPL-SCNC: 101 MMOL/L (ref 95–110)
CO2 SERPL-SCNC: 20 MMOL/L (ref 23–29)
CREAT SERPL-MCNC: 6.2 MG/DL (ref 0.5–1.4)
DIFFERENTIAL METHOD BLD: ABNORMAL
EOSINOPHIL # BLD AUTO: 0.2 K/UL (ref 0–0.5)
EOSINOPHIL NFR BLD: 3.6 % (ref 0–8)
ERYTHROCYTE [DISTWIDTH] IN BLOOD BY AUTOMATED COUNT: 17.2 % (ref 11.5–14.5)
EST. GFR  (NO RACE VARIABLE): 9.5 ML/MIN/1.73 M^2
GLUCOSE SERPL-MCNC: 87 MG/DL (ref 70–110)
HBV SURFACE AG SERPL QL IA: NORMAL
HCT VFR BLD AUTO: 33.8 % (ref 40–54)
HGB BLD-MCNC: 9.9 G/DL (ref 14–18)
IMM GRANULOCYTES # BLD AUTO: 0.08 K/UL (ref 0–0.04)
IMM GRANULOCYTES NFR BLD AUTO: 1.4 % (ref 0–0.5)
INR PPP: 2.7 (ref 0.8–1.2)
LDH SERPL L TO P-CCNC: 570 U/L (ref 110–260)
LYMPHOCYTES # BLD AUTO: 0.7 K/UL (ref 1–4.8)
LYMPHOCYTES NFR BLD: 11.2 % (ref 18–48)
MAGNESIUM SERPL-MCNC: 1.8 MG/DL (ref 1.6–2.6)
MCH RBC QN AUTO: 25.9 PG (ref 27–31)
MCHC RBC AUTO-ENTMCNC: 29.3 G/DL (ref 32–36)
MCV RBC AUTO: 89 FL (ref 82–98)
MONOCYTES # BLD AUTO: 0.4 K/UL (ref 0.3–1)
MONOCYTES NFR BLD: 7.1 % (ref 4–15)
NEUTROPHILS # BLD AUTO: 4.4 K/UL (ref 1.8–7.7)
NEUTROPHILS NFR BLD: 76.4 % (ref 38–73)
NRBC BLD-RTO: 0 /100 WBC
PHOSPHATE SERPL-MCNC: 2.9 MG/DL (ref 2.7–4.5)
PLATELET # BLD AUTO: 212 K/UL (ref 150–450)
PMV BLD AUTO: 10.5 FL (ref 9.2–12.9)
POTASSIUM SERPL-SCNC: 4.5 MMOL/L (ref 3.5–5.1)
PROTHROMBIN TIME: 27.7 SEC (ref 9–12.5)
RBC # BLD AUTO: 3.82 M/UL (ref 4.6–6.2)
SODIUM SERPL-SCNC: 134 MMOL/L (ref 136–145)
WBC # BLD AUTO: 5.78 K/UL (ref 3.9–12.7)

## 2025-03-11 PROCEDURE — 93750 INTERROGATION VAD IN PERSON: CPT | Mod: ,,, | Performed by: INTERNAL MEDICINE

## 2025-03-11 PROCEDURE — 27001001 HC LVAD KIT (15 DAY SUPPLY)

## 2025-03-11 PROCEDURE — 83615 LACTATE (LD) (LDH) ENZYME: CPT | Performed by: STUDENT IN AN ORGANIZED HEALTH CARE EDUCATION/TRAINING PROGRAM

## 2025-03-11 PROCEDURE — 63600175 PHARM REV CODE 636 W HCPCS: Performed by: INTERNAL MEDICINE

## 2025-03-11 PROCEDURE — 97110 THERAPEUTIC EXERCISES: CPT

## 2025-03-11 PROCEDURE — 85610 PROTHROMBIN TIME: CPT | Performed by: STUDENT IN AN ORGANIZED HEALTH CARE EDUCATION/TRAINING PROGRAM

## 2025-03-11 PROCEDURE — 99233 SBSQ HOSP IP/OBS HIGH 50: CPT | Mod: ,,, | Performed by: INTERNAL MEDICINE

## 2025-03-11 PROCEDURE — 85730 THROMBOPLASTIN TIME PARTIAL: CPT | Performed by: STUDENT IN AN ORGANIZED HEALTH CARE EDUCATION/TRAINING PROGRAM

## 2025-03-11 PROCEDURE — 27000248 HC VAD-ADDITIONAL DAY

## 2025-03-11 PROCEDURE — 25000003 PHARM REV CODE 250: Performed by: PHYSICIAN ASSISTANT

## 2025-03-11 PROCEDURE — 36415 COLL VENOUS BLD VENIPUNCTURE: CPT | Performed by: STUDENT IN AN ORGANIZED HEALTH CARE EDUCATION/TRAINING PROGRAM

## 2025-03-11 PROCEDURE — 83735 ASSAY OF MAGNESIUM: CPT | Performed by: STUDENT IN AN ORGANIZED HEALTH CARE EDUCATION/TRAINING PROGRAM

## 2025-03-11 PROCEDURE — 84100 ASSAY OF PHOSPHORUS: CPT | Performed by: STUDENT IN AN ORGANIZED HEALTH CARE EDUCATION/TRAINING PROGRAM

## 2025-03-11 PROCEDURE — 85025 COMPLETE CBC W/AUTO DIFF WBC: CPT | Performed by: STUDENT IN AN ORGANIZED HEALTH CARE EDUCATION/TRAINING PROGRAM

## 2025-03-11 PROCEDURE — 97165 OT EVAL LOW COMPLEX 30 MIN: CPT

## 2025-03-11 PROCEDURE — 87340 HEPATITIS B SURFACE AG IA: CPT

## 2025-03-11 PROCEDURE — 97530 THERAPEUTIC ACTIVITIES: CPT

## 2025-03-11 PROCEDURE — 97161 PT EVAL LOW COMPLEX 20 MIN: CPT

## 2025-03-11 PROCEDURE — 25000003 PHARM REV CODE 250: Performed by: STUDENT IN AN ORGANIZED HEALTH CARE EDUCATION/TRAINING PROGRAM

## 2025-03-11 PROCEDURE — 80048 BASIC METABOLIC PNL TOTAL CA: CPT | Performed by: STUDENT IN AN ORGANIZED HEALTH CARE EDUCATION/TRAINING PROGRAM

## 2025-03-11 PROCEDURE — 94761 N-INVAS EAR/PLS OXIMETRY MLT: CPT

## 2025-03-11 PROCEDURE — 20600001 HC STEP DOWN PRIVATE ROOM

## 2025-03-11 RX ORDER — MUPIROCIN 20 MG/G
OINTMENT TOPICAL 2 TIMES DAILY
Status: DISCONTINUED | OUTPATIENT
Start: 2025-03-11 | End: 2025-03-12 | Stop reason: HOSPADM

## 2025-03-11 RX ORDER — DRONABINOL 2.5 MG/1
2.5 CAPSULE ORAL 2 TIMES DAILY
Status: DISCONTINUED | OUTPATIENT
Start: 2025-03-11 | End: 2025-03-12 | Stop reason: HOSPADM

## 2025-03-11 RX ORDER — SODIUM CHLORIDE 9 MG/ML
INJECTION, SOLUTION INTRAVENOUS ONCE
Status: CANCELLED | OUTPATIENT
Start: 2025-03-12

## 2025-03-11 RX ADMIN — DRONABINOL 2.5 MG: 2.5 CAPSULE ORAL at 09:03

## 2025-03-11 RX ADMIN — MUPIROCIN: 20 OINTMENT TOPICAL at 08:03

## 2025-03-11 RX ADMIN — AMIODARONE HYDROCHLORIDE 400 MG: 200 TABLET ORAL at 08:03

## 2025-03-11 RX ADMIN — ATORVASTATIN CALCIUM 40 MG: 40 TABLET, FILM COATED ORAL at 09:03

## 2025-03-11 RX ADMIN — SEVELAMER CARBONATE 800 MG: 800 TABLET, FILM COATED ORAL at 02:03

## 2025-03-11 RX ADMIN — PREGABALIN 100 MG: 50 CAPSULE ORAL at 08:03

## 2025-03-11 RX ADMIN — WARFARIN SODIUM 2.5 MG: 2.5 TABLET ORAL at 05:03

## 2025-03-11 RX ADMIN — TRAZODONE HYDROCHLORIDE 25 MG: 50 TABLET ORAL at 09:03

## 2025-03-11 RX ADMIN — SEVELAMER CARBONATE 800 MG: 800 TABLET, FILM COATED ORAL at 07:03

## 2025-03-11 RX ADMIN — SEVELAMER CARBONATE 800 MG: 800 TABLET, FILM COATED ORAL at 09:03

## 2025-03-11 RX ADMIN — VENLAFAXINE 37.5 MG: 37.5 TABLET ORAL at 08:03

## 2025-03-11 RX ADMIN — MUPIROCIN: 20 OINTMENT TOPICAL at 09:03

## 2025-03-11 NOTE — DISCHARGE INSTRUCTIONS
Physical and Occupational Therapy at [facility]  Location: [address] [address]    Phone number: [phone]    [facility]  Location: [address] [address]  Phone number: [phone]    Resume services on: Friday, 3/14/25, at 1st shift (6:30 am)

## 2025-03-11 NOTE — PROGRESS NOTES
[facility] - Cardiac Intensive Care  Adult Nutrition  Consult Note    SUMMARY     Recommendations    1. Continue cardiac diet as tolerated     2. Encourage good intake    3. RD to monitor weight, labs, intake    Goals:   1. % nutritional needs met with diet     2. Maintain weight during admission  Nutrition Goal Status: new  Communication of RD Recs: other (comment) (POC)    Nutrition Discharge Planning     Nutrition Discharge Planning: Therapeutic diet (comments)  Therapeutic diet (comments): cardiac diet    Assessment and Plan    Nutrition Problem  Inadequate energy intake    Related to (etiology):   Physiological causes that result in increased energy requirements (increased nutrient needs due to prolonged catabolic illness)    Signs and Symptoms (as evidenced by):   Pt consuming < 75% of meals and skipping meals     Interventions/Recommendations (treatment strategy):  Collaboration with other providers    Nutrition Diagnosis Status:   New    Malnutrition Assessment    NFPE at follow up.     Reason for Assessment    Reason For Assessment: consult (nutritional assessment)  Diagnosis: other (see comments) (falls - left flank pain)  General Information Comments: Pt admitted with falls - left flank pain. PMHx: stage D HFrEF, ICM s/p HM3 as DT (12/1/2023), CHF, ESRD on HD (MWF), debility, DM 2, MI, CAD, ICD, aspiration of both lungs, PAF, LVAD present. No recent surgical hx. No edema noted. Pt sleeping during visit - spoke with pt's guest. No GI s/s - except diarrhea - BM: 3/11. Pt having a fair intake - PO: 25-50%. Pt skipped lunch due to not liking the meal provided - menu taken but wrong items sent. Boost denied. Wt fluctuations - suspected fluid.    Nutrition/Diet History    Nutrition Intake History: 3 meals and snacks  Spiritual, Cultural Beliefs, Yazidism Practices, Values that Affect Care: no  Food Allergies: NKFA  Factors Affecting Nutritional Intake: other (see comments) (doesn't like food  provided)    Nutrition Related Social Determinants of Health: SDOH: Adequate food in home environment    Food Insecurity: No Food Insecurity (3/11/2025)    Hunger Vital Sign     Worried About Running Out of Food in the Last Year: Never true     Ran Out of Food in the Last Year: Never true     Anthropometrics    Height: 6' (182.9 cm)  Height (inches): 72 in  Height Method: Stated  Weight: 82.1 kg (181 lb)  Weight (lb): 181 lb  Weight Method: Standard Scale  Ideal Body Weight (IBW), Male: 178 lb  % Ideal Body Weight, Male (lb): 103.79 %  BMI (Calculated): 24.5  BMI Grade: 18.5-24.9 - normal  Usual Body Weight (UBW), k.3 kg  % Usual Body Weight: 106.43    Lab/Procedures/Meds    Pertinent Labs Reviewed: reviewed  Pertinent Labs Comments: H/H 9.9/33.8 low, Na 134 low, BUN 36 high, creatinine 6.2 high, GFR 9.5 low, Ca 8.3 low,  high, albumin 2.2 low,  high,  high, CRP 58.7 high  Pertinent Medications Reviewed: reviewed  Pertinent Medications Comments: NaCl, amiodarone, atorvastatin, dronabinol, pregabalin, sevelamer, venlafaxine, warfarin    Estimated/Assessed Needs    Weight Used For Calorie Calculations: 80 kg (176 lb 5.9 oz)  Energy Calorie Requirements (kcal): 9569-0136 kcal  Energy Need Method: Kcal/kg (25-30 kcal/kg)  Protein Requirements: 80-96 g/pro (1.0-1.2 g/kg)  Weight Used For Protein Calculations: 80 kg (176 lb 5.9 oz)        RDA Method (mL):   CHO Requirement: 250-350 g    Nutrition Prescription Ordered    Current Diet Order: cardiac 1500 ml    Evaluation of Received Nutrient/Fluid Intake    Energy Calories Required: not meeting needs  Protein Required: not meeting needs  Tolerance: tolerating  % Intake of Estimated Energy Needs: 25 - 50 %  % Meal Intake: 25 - 50 %    Nutrition Risk    Level of Risk/Frequency of Follow-up: low - moderate     Monitor and Evaluation    Monitor and Evaluation: Energy intake, Food and beverage intake, Protein intake, Carbohydrate intake, Diet order,  Food and nutrition knowledge, Weight, Electrolyte and renal panel, Gastrointestinal profile, Glucose/endocrine profile, Inflammatory profile, Lipid profile, Nutrition focused physical findings     Nutrition Follow-Up    RD Follow-up?: Yes

## 2025-03-11 NOTE — PLAN OF CARE
Unable to get accurate urine output  d/t pt urinated when had BM, made team aware. Pt on dialysis. VSS. LVAD number and doppler WNL. LVAD dressing and dialysis catheter dressing changed per protocol ( see note). Pt educated on fall risk and remained free from falls/trauma/injury. Denies chest pain, SOB, palpitations, dizziness, pain, or discomfort. Plan of care reviewed with pt, all questions answered. Bed locked in lowest position, call bell within reach, no acute distress noted, will continue to monitor.

## 2025-03-11 NOTE — PLAN OF CARE
Recommendations     1. Continue cardiac diet as tolerated     2. Encourage good intake    3. RD to monitor weight, labs, intake     Goals:   1. % nutritional needs met with diet     2. Maintain weight during admission  Nutrition Goal Status: new  Communication of RD Recs: other (comment) (POC)     Nutrition Discharge Planning      Nutrition Discharge Planning: Therapeutic diet (comments)  Therapeutic diet (comments): cardiac diet

## 2025-03-11 NOTE — PROGRESS NOTES
[facility] - [facility]  Nephrology  Progress Note    Patient Name: Radha Abbott  MRN: [id number]  Admission Date: 3/10/2025  Hospital Length of Stay: 1 days  Attending Provider: Walter Mcintyre MD   Primary Care Physician: Vasu Kong MD  Principal Problem:<principal problem not specified>    Subjective:     Interval History: NAEON. HD yesterday, net UF 1112 mL, had LFA x 3 during HD, UF paused and given 200 cc NS bolus with improvement. Electrolytes today stable. Breathing comfortably on room air.     Review of patient's allergies indicates:  No Known Allergies  Current Facility-Administered Medications   Medication Frequency    0.9% NaCl infusion Once    amiodarone tablet 400 mg Daily    atorvastatin tablet 40 mg QHS    droNABinol capsule 2.5 mg BID    heparin (porcine) injection 1,000 Units PRN    mupirocin 2 % ointment BID    pregabalin capsule 100 mg Every other day    sevelamer carbonate tablet 800 mg TID WM    trazodone split tablet 25 mg QHS    venlafaxine tablet 37.5 mg Daily    warfarin (COUMADIN) split tablet 1.25 mg Every Mon, Wed, Fri    warfarin (COUMADIN) tablet 2.5 mg Every Tues, Thurs, Sat, Sun       Objective:     Vital Signs (Most Recent):  Temp: 97.8 °F (36.6 °C) (03/11/25 1153)  Pulse: 80 (03/11/25 1200)  Resp: 18 (03/11/25 1153)  BP: (!) 72/0 (03/11/25 1153)  SpO2: 99 % (03/11/25 1153) Vital Signs (24h Range):  Temp:  [97.7 °F (36.5 °C)-98.9 °F (37.2 °C)] 97.8 °F (36.6 °C)  Pulse:  [75-88] 80  Resp:  [16-18] 18  SpO2:  [92 %-99 %] 99 %  BP: (58-98)/(0-76) 72/0     Weight: 82.1 kg (181 lb) (03/11/25 1227)  Body mass index is 24.55 kg/m².  Body surface area is 2.04 meters squared.    I/O last 3 completed shifts:  In: 440 [P.O.:240; Other:200]  Out: 1812 [Other:1812]     Physical Exam  Vitals and nursing note reviewed.   Constitutional:       General: He is not in acute distress.  HENT:      Head: Normocephalic.   Eyes:      Conjunctiva/sclera: Conjunctivae normal.   Pulmonary:       Effort: Pulmonary effort is normal. No respiratory distress.   Abdominal:      Palpations: Abdomen is soft.   Musculoskeletal:      Right lower leg: No edema.      Left lower leg: No edema.   Skin:     General: Skin is warm and dry.      Coloration: Skin is not jaundiced.   Neurological:      Mental Status: He is alert.   Psychiatric:         Mood and Affect: Mood normal.          Significant Labs:  CBC:   Recent Labs   Lab 03/11/25  0639   WBC 5.78   RBC 3.82*   HGB 9.9*   HCT 33.8*      MCV 89   MCH 25.9*   MCHC 29.3*     CMP:   Recent Labs   Lab 03/10/25  1139 03/11/25  0639   GLU 98 87   CALCIUM 8.4* 8.3*   ALBUMIN 2.2*  --    PROT 7.0  --    * 134*   K 4.9 4.5   CO2 23 20*   CL 99 101   BUN 62* 36*   CREATININE 8.2* 6.2*   ALKPHOS 151*  --    *  --    *  --    BILITOT 0.6  --      All labs within the past 24 hours have been reviewed.     Assessment/Plan:     Cardiac/Vascular  Acute combined systolic and diastolic congestive heart failure  -management per primary    LVAD (left ventricular assist device) present  -management per primary    Renal/  ESRD (end stage renal disease)  64 year old male hx of ESRD on HD MWF. Last HD Friday 3/7/25. Nephrology consulted for management of ESRD.     Nephrology History  -[facility]  -Nephrologist: ?  -HD tx days: MWF  -HD prescription: 3 hrs, F160, 350/800  -Last HD tx: 3/7/25  -HD access: R CVC  -HD modality: iHD  -Residual urine: minimal  -EDW:  81 kg    Plan/Recommendations  ESRD on HD:  -No need for emergent RRT, electrolytes stable. Plan for HD tomorrow to continue MWF schedule.  -Will continue iHD thrice weekly unless emergent indication arises  -Strict I&Os  -Pre & post HD weight  Anemia in ESRD  -Hgb goal 10-11, hgb at goal  Mineral Bone Disease in ESRD  -Continue phos binders  -Renal diet if not NPO          Thank you for your consult. I will follow-up with patient. Please contact us if you have any additional  questions.    Malina Rivas PA-C  Nephrology  [facility]

## 2025-03-11 NOTE — PROGRESS NOTES
Order placed for VAD 15 Silver Kits and charge placed per protocol. Kits to be delivered to patient's bedside. Bedside RN notified.    Rounded on patient at bedside. Patient is in good spirits at this time. Wife and patient expressed no needs except supplies, box ordered. VAD numbers WNL for baseline. No additional need at this time.

## 2025-03-11 NOTE — PROGRESS NOTES
[facility] - [facility]  Heart Transplant  Progress Note    Patient Name: Radha Abbott  MRN: [id number]  Admission Date: 3/10/2025  Hospital Length of Stay: 1 days  Attending Physician: Walter Mcintyre MD  Primary Care Provider: Vasu Kong MD  Principal Problem:<principal problem not specified>    Subjective:   Interval History: NAEON. No acute complaints. Pending PT evaluation.     Continuous Infusions:  Scheduled Meds:   0.9% NaCl   Intravenous Once    amiodarone  400 mg Oral Daily    atorvastatin  40 mg Oral QHS    mupirocin   Nasal BID    pregabalin  100 mg Oral Every other day    sevelamer carbonate  800 mg Oral TID WM    traZODone  25 mg Oral QHS    venlafaxine  37.5 mg Oral Daily    warfarin  1.25 mg Oral Every Mon, Wed, Fri    warfarin  2.5 mg Oral Every Tues, Thurs, Sat, Sun     PRN Meds:  Current Facility-Administered Medications:     heparin (porcine), 1,000 Units, Intra-Catheter, PRN    Review of patient's allergies indicates:  No Known Allergies  Objective:     Vital Signs (Most Recent):  Temp: 98.3 °F (36.8 °C) (03/11/25 0750)  Pulse: 76 (03/11/25 0750)  Resp: 18 (03/11/25 0750)  BP: (!) 88/60 (03/11/25 0752)  SpO2: 95 % (03/11/25 0750) Vital Signs (24h Range):  Temp:  [97.7 °F (36.5 °C)-98.9 °F (37.2 °C)] 98.3 °F (36.8 °C)  Pulse:  [75-82] 76  Resp:  [16-18] 18  SpO2:  [92 %-98 %] 95 %  BP: ()/(0-76) 88/60     Patient Vitals for the past 72 hrs (Last 3 readings):   Weight   03/11/25 0415 80 kg (176 lb 5.9 oz)   03/10/25 1220 83.8 kg (184 lb 11.9 oz)     Body mass index is 23.92 kg/m².      Intake/Output Summary (Last 24 hours) at 3/11/2025 0858  Last data filed at 3/10/2025 2318  Gross per 24 hour   Intake 440 ml   Output 1812 ml   Net -1372 ml       Hemodynamic Parameters:            Physical Exam  Constitutional:       General: He is not in acute distress.     Appearance: Normal appearance.   HENT:      Head: Normocephalic and atraumatic.   Eyes:      Conjunctiva/sclera:  Conjunctivae normal.   Neck:      Vascular: No hepatojugular reflux or JVD.   Cardiovascular:      Comments: VAD hum appreciated  Pulmonary:      Breath sounds: Normal breath sounds.      Comments: Clear to auscultation  Abdominal:      Comments: Soft, non-tender, non-distended   Musculoskeletal:      Cervical back: Normal range of motion.      Comments: No peripheral edema   Skin:     General: Skin is warm and dry.      Capillary Refill: Capillary refill takes less than 2 seconds.   Neurological:      Mental Status: He is alert and oriented to person, place, and time.   Psychiatric:         Mood and Affect: Mood and affect normal.            Significant Labs:  CBC:  Recent Labs   Lab 03/09/25  2023 03/10/25  1139 03/11/25  0639   WBC 8.07 8.77 5.78   RBC 4.28* 3.94* 3.82*   HGB 11.3* 10.5* 9.9*   HCT 37.3* 34.6* 33.8*    211 212   MCV 87 88 89   MCH 26.4* 26.6* 25.9*   MCHC 30.3* 30.3* 29.3*     BNP:  Recent Labs   Lab 03/09/25  2023 03/10/25  1139   * 1,231*     CMP:  Recent Labs   Lab 03/09/25  2023 03/10/25  1139 03/11/25  0639   * 98 87   CALCIUM 8.9 8.4* 8.3*   ALBUMIN 2.8* 2.2*  --    PROT 8.0 7.0  --    * 133* 134*   K 5.0 4.9 4.5   CO2 19* 23 20*   CL 97 99 101   BUN 52* 62* 36*   CREATININE 8.5* 8.2* 6.2*   ALKPHOS 161* 151*  --    * 199*  --    * 221*  --    BILITOT 0.6 0.6  --       Coagulation:   Recent Labs   Lab 03/09/25  2023 03/10/25  1139 03/11/25  0639   INR 2.6* 3.0* 2.7*   APTT 35.7*  --  40.0*     LDH:  Recent Labs   Lab 03/11/25  0639   *     Microbiology:  Microbiology Results (last 7 days)       Procedure Component Value Units Date/Time    Clostridium difficile EIA [[id number]]     Order Status: No result Specimen: Stool             I have reviewed all pertinent labs within the past 24 hours.    Estimated Creatinine Clearance: 13.6 mL/min (A) (based on SCr of 6.2 mg/dL (H)).    Diagnostic Results:  I have reviewed all pertinent imaging  results/findings within the past 24 hours.  Assessment and Plan:     Mr. Radha Abbott is a 65 y/o AAM w/ PMHx of stage D HFrEF, ICM s/p HM3 as DT (12/1/2023), ESRD on HD (MWF), and debility who presented to ED in [place] w/ complaints of  falls x2. States when he leans over at 45 degree angle he is not able to lift himself back up leading to his falls. States he has been having left sided flank/hip pain for about 1 mth now that is worse with 45 degree lean and better w/ laying flat.     SOB, chest pain, nausea, vomiting, endorses abd distention, no fever or chills, orthopnea, pND. LFA     Pt has LVAD in place and wife reports it went off twice today. Wife reports LVAD displayed PI of 11 and flow of 2.5 earlier today. Pt had the LVAD placed 12/1/2023 and last had it checked 2 months ago. Pt is in Mon/Wed/Fri dialysis and last had dialyzed 2 days ago. Associated sxs include HTN, decreased appetite for the last 2-3 weeks, bloody stool, and 1 episode of weakness while urinating at 2:30 today. Wife reports pt's normal blood pressure is 90/60. Wife reports pt also fell twice: first time 5 days ago, and second time earlier today. Wife reports pt hit is head during his fall today. Symptoms are constant and moderate in severity. No mitigating or exacerbating factors reported. Wife denies any fever, nausea, or emesis from pt. Wife reports pt is on fluid restriction diet and has been doing well with. Prior Tx includes 1 dose of Hydroxyzine 25 mg as instructed by LVAD team over the phone.  No further complaints or concerns at this time.     Home Medications:   amiodarone HCl 400 mg Oral Daily  atorvastatin calcium 40 mg Oral Nightl  gabapentin 300 mg Nightly  pantoprazole sodium 40 mg Oral Daily  trazodone HCl 25 mg Oral Nightly  venlafaxine HCl 37.5 mg Oral  warfarin sodium 2.5 MG daily except MWF      Cardiac Imaging:  Echo (12/19/2024):     Left Ventricle: The left ventricle is normal in size. Ventricular mass is normal.  Normal wall thickness. There is severely reduced systolic function with a visually estimated ejection fraction of 10 -15%. Diastolic function cannot be reliably determined in the presence of mitral valve repair and presence of LVAD. Limtited view od LV apex due to near field artifcat. No obvious thrombus seen.    Right Ventricle: Normal right ventricular cavity size. Wall thickness is normal. Right ventricle wall motion  is normal. Systolic function is moderately reduced.    Left Atrium: Left atrium is moderately dilated.    Right Atrium: Right atrium is mildly dilated. Lead present in the right atrium.    Mitral Valve: The mitral valve is repaired with an Noble stitch.    Aorta: Aortic root is normal in size measuring 2.77 cm. Ascending aorta is normal measuring 3.26 cm.    Pulmonary Artery: The estimated pulmonary artery systolic pressure is 33 mmHg.    IVC/SVC: Intermediate venous pressure at 8 mmHg.    Pericardium: There is no pericardial effusion.    RHC (1/22/2024):    RHC performed via the right IJ. Patient tolerated the procedure well. Elevated left and right side filling pressures (RA= 13 mm of Hg, PCWP=22 mm of Hg). Mild to moderate pulmonary HTN  (PA= 50/25 mm of Hg, PA mean=35 mm of Hg) in the setting of elevated left sided filling pressures. Normal high cardiac index and output  (CI=2.98 L/min/m2, CO=5.8 L/min) with HM3 support at LVAD speed of 4900 rpm.    Recommend gentle IV diuresis/Echo to reassess LV /RV size and speed adjustment.           Left flank pain  - Appears to MSK related.  - CT pelvis unremarkable.   - PT consulted. Appreciate recommendations.     Falls  Fall 2/2 to left flank pain./back See plan above.     Acute on chronic combined systolic and diastolic CHF, NYHA class 4  Secondary to ESRD. Expect to improve with scheduled dialysis. Nephrology consulted to resume dialysis.     Positive occult stool blood test  - most likely secondary to known hemorrhoids.   - Hgb stable and bleeding  resolved.   - Will continue to monitor for now.     LVAD (left ventricular assist device) present  Procedure: Device Interrogation Including analysis of device parameters  Current Settings: Ventricular Assist Device  Review of device function is stable/unstable stable  Anticoagulation w/ warfarin. Goal INR 1-2.   Has some LFA in VAD history but most in the setting of either isolated HTN or dialysis session. Will monitor for now.         3/10/2025    10:16 AM 1/30/2025     8:35 AM 12/19/2024    10:41 AM 11/21/2024    10:30 AM 10/24/2024    10:09 AM 10/16/2024     7:43 PM 9/26/2024    10:21 AM   TXP LVAD INTERROGATIONS   Type HeartMate3     HeartMate3    Flow 3     4.1    Speed 5000     5000    PI 8.8     6    Power ([place]) 3.4     3.6    Pulsatility  Intermittent pulse No Pulse Pulse No Pulse Intermittent pulse No Pulse             Angela Shen MD  Heart Transplant  [facility]

## 2025-03-11 NOTE — PT/OT/SLP EVAL
"Physical Therapy Co-Evaluation and Co-Treatment    Patient Name:  Radha Abbott   MRN:  [id number]  Admit Date: 3/10/2025  Admitting Diagnosis:  <principal problem not specified>   Length of Stay: 1 days  Recent Surgery: * No surgery found *      Recommendations:     Discharge Recommendations: Low Intensity Therapy  Discharge Equipment Recommendations: none   Barriers to discharge: None    Appropriate transfer level with nursing staff: ambulation with assist x 1 and RW    Plan:     During this hospitalization, patient to be seen 2 x/week to address the identified rehab impairments via gait training, therapeutic activities, therapeutic exercises, neuromuscular re-education and progress towards the established goals.  Plan of Care Expires:  04/10/25  Plan of Care Reviewed with: patient, spouse    Assessment     Radha Abbott is a 64 y.o. male admitted with a medical diagnosis of <principal problem not specified>. Pt tolerated evaluation well today. Pt presented to [facility] on 3/10 with c/o Lt flank pain resulting in x2 falls on 3/2 and this past Sunday. Pt states his low back "locks up" when transitioning from sit > stand and he is unable to straighten trunk or reach full stand which has caused the falls. On evaluation today pt with c/o 7/10 low back pain. He demo'ed equal and 5/5 strength in BLE and no sensory deficits with no increase in back pain with strength testing with no TTP noted to Lt back. On initiation of mobility he demo'ed no increased c/o low back pain and no incidence of spasms or "locking." During ambulation without AD pt with narrow AURORA, decreased foot clearance causing an incidence of posterior LOB req'ing min A to recover. This greatly improved with addition of RW which allowed for widening of AURORA, increased step length, and improved foot clearance. Pt and wife report pt mobilizing at his baseline level upon evaluation today.   Lt flank pain appears muscular in nature based on pt and wife report. Educated pt " "and wife on utilizing hot packs to assist with spasms as well as safety measures if low back "locks up" to prevent fall - ie returning to sitting and calling for help. Based on clinical presentation, co-morbidities, and today's performance, patient would benefit from skilled physical therapy services to progress functional transfers, improve musculoskeletal strength, as well as increase pt's cardiopulmonary endurance to maximize pt's independence and return to PLOF.    Problem List: impaired endurance, weakness, impaired self care skills, impaired functional mobility, gait instability, impaired balance, decreased lower extremity function, pain.  Rehab Prognosis: Good; patient would benefit from acute skilled PT services to address these deficits and reach maximum level of function.      Goals:   Multidisciplinary Problems       Physical Therapy Goals          Problem: Physical Therapy    Goal Priority Disciplines Outcome Interventions   Physical Therapy Goal     PT, PT/OT Progressing    Description: Goals to be met by: 3/25/2025     Patient will increase functional independence with mobility by performin. Sit to stand transfer with Modified [place]  2. Gait  x 250 feet with Supervision using RW or LRAD  3. Stand for 5 minutes with Supervision using RW or LRAD with no increase in back pain  4. Lower extremity exercise program x30 reps per handout, with independence                         Subjective     RN notified prior to session. Wife present upon PT entrance into room. Patient agreeable to PT evaluation.    Chief Complaint: No chief complaint on file.  Patient/Family Comments/goals: "my back just locked up and I couldn't stand up all the way"  Pain/Comfort:  Pain Rating 1: 7/10  Location - Side 1: Left  Location 1: flank  Pain Addressed 1: Distraction, Reposition  Pain Rating Post-Intervention 1: 710    Social History:  Residence: Patient lives with their spouse in a single story house with number of " outside stair(s): 0 JACKIE . Pt's bathroom has a walk in shower with built in bench and grab bars.  Equipment Owned (not using): bedside commode, walker, rolling, rollator, wheelchair  Equipment Used: rolling walker, bedside commode, and wheelchair  Prior level of function:  Prior to admission, patient was modified independent. Pt utilizes RW to ambulate to MD/therapy appointments. Reports primarily using wc for home mobility as well as ambulating short distances with RW. X2 recent falls due to back locking up  Work: Retired.   Drive: no.   Managing Medicines/Managing Home: yes.   Assistance Upon Discharge:  wife and sister    Objective:     Additional staff present: OT; OT for co-evaluation due to suspected patient need for two skilled therapists to appropriately assess patient's functional deficits as well as ensure patient safety, accommodate for limited activity tolerance, and provide appropriate, skilled assistance to maximize functional potential during evaluation.    Patient found up in chair with: telemetry, LVAD     General Precautions: Standard, Cardiac fall, LVAD   Orthopedic Precautions:N/A   Braces: N/A   Body mass index is 23.92 kg/m².  Oxygen Device: Room Air  Vitals: BP (!) 88/60 (BP Location: Right arm, Patient Position: Lying)   Pulse 88   Temp 98.3 °F (36.8 °C) (Oral)   Resp 18   Ht 6' (1.829 m)   Wt 80 kg (176 lb 5.9 oz)   SpO2 95%   BMI 23.92 kg/m²     Exams:  Cognition:   Alert and Cooperative   Patient is oriented to Person, Place, Time, Situation  Command following: Follows multistep verbal commands  Fluency: clear/fluent  Hearing: Intact  Vision:  Intact  Skin Integrity: Visible skin intact  Postural Assessment: no deviations noted  Physical Exam:    Left UE Left LE Right UE Right LE   Edema absent absent absent absent   ROM AROM WFL AROM WFL AROM WFL AROM WFL   Strength 5/5 5/5 5/5 5/5   Sensation intact to light touch intact to light touch - reports intermittent tingling to toes but  not present at this time intact to light touch intact to light touch- reports intermittent tingling to toes but not present at this time   Coordination normal normal normal normal     Outcome Measures:  AM-PAC 6 CLICK MOBILITY  Turning over in bed (including adjusting bedclothes, sheets and blankets)?: 4  Sitting down on and standing up from a chair with arms (e.g., wheelchair, bedside commode, etc.): 3  Moving from lying on back to sitting on the side of the bed?: 4  Moving to and from a bed to a chair (including a wheelchair)?: 3  Need to walk in hospital room?: 3  Climbing 3-5 steps with a railing?: 3  Basic Mobility Total Score: 20     Functional Mobility:    Bed Mobility:   Pt found/returned to bedside chair    Transfers:   Sit <> Stand Transfer: contact guard assistance with no AD. [id number] from bedside chair and with increased time    Standing Balance:  Static Standing Balance: Good- : able to maintain standing balance against minimal resistance  Dynamic Standing Balance: Fair : stand independently unsupported, weight shift, and reach ipsilaterally. LOB noted when crossing midline.  Assistance Level Required: Contact Guard Assistance  Patient used: no assistive device   Comments: no overt LOB, no c/o dizziness or increase in back pain in standing      Gait:   Patient ambulated: 14 ft with no AD and CGA/Min A + 180 ft with RW and SBA   Gait Pattern observed: reciprocal gait  Gait Deviation(s): occasional unsteady gait, decreased step length, narrow base of support, decreased foot clearance, flexed posture, decreased gina, and shuffle gait  Impairments due to: impaired balance and pain  all lines remained intact throughout ambulation trial  LVAD: Consolidation vest utilized  Comments: Patient initially ambulated 14 ft with no AD with narrow AURORA, decreased foot clearance with one episode of Lt toe catching in swing phase causing post LOB req'ing min A to recover. Pt provided with RW with improved step length,  "foot clearance in swing width of AURORA, and overall balance confidence. Pt with no LOB and improved gait speed and efficiency. Pt initially reporting LLE felt "weaker" but this improved as gait trial progressed. At end of trial pt stated this felt like how he walks at home.      Education:  Time provided for education, counseling and discussion of health disposition in regards to patient's current status  All questions answered within PT scope of practice and to patient's satisfaction  PT role in POC to address current functional deficits  Pt educated on proper body mechanics, safety techniques, and energy conservation with PT facilitation and cueing throughout session  Call nursing/pct to transfer to chair/use bathroom. Pt stated understanding.  RW ordered for in room use with nursing staff  Whiteboard updated with therapist name and pt's current mobility status documented above  Safe to perform step transfer to/from chair/bedside commode assist x 1 and RW w/ nursing/PCT present  Pt to ambulate 2-3x/day assist x 1 and RW with nsg/PCT in order to maintain functional mobility  Importance of OOB tolerance 8-10 hrs/day to improve lung ventilation and expansion as well as strengthen postural musculature  LVAD: patient found on battery power / returned on battery power. no alarms sounded.       DME Justifications:  No DME recommended requiring DME justifications    Patient left up in chair with all lines intact, call button in reach, RN notified, and wife present.    History:     Past Medical History:   Diagnosis Date    Aspiration pneumonia of both lungs 06/17/2024    CAD (coronary artery disease)     CHF (congestive heart failure)     Delirium 06/16/2024    Diabetes mellitus     HFrEF (heart failure with reduced ejection fraction)     Hypoglycemia 06/12/2024    ICD (implantable cardioverter-defibrillator) in place     LVAD (left ventricular assist device) present 12/01/2023    MI, old     PAF (paroxysmal atrial " fibrillation) 10/11/2023    Stage 4 chronic kidney disease 02/15/2024    Type 2 diabetes mellitus without complication, without long-term current use of insulin 10/07/2023       Past Surgical History:   Procedure Laterality Date    ANGIOPLASTY-VENOUS ARTERY Right 12/1/2023    Procedure: ANGIOPLASTY-VENOUS ARTERY, RIGHT FEMORAL;  Surgeon: Yuri Washington MD;  [facility];  Service: Cardiovascular;  Laterality: Right;    AORTIC VALVULOPLASTY N/A 12/1/2023    Procedure: REPAIR, AORTIC VALVE;  Surgeon: Yuri Washington MD;  [facility];  Service: Cardiovascular;  Laterality: N/A;    CARDIAC SURGERY      CLOSURE N/A 12/1/2023    Procedure: CLOSURE, TEMPORARY;  Surgeon: Yuri Washington MD;  [facility];  Service: Cardiovascular;  Laterality: N/A;    DRAINAGE OF PLEURAL EFFUSION  12/4/2023    Procedure: DRAINAGE, PLEURAL EFFUSION;  Surgeon: Yuri Washington MD;  [facility];  Service: Cardiovascular;;    INSERTION OF GRAFT TO PERICARDIUM  12/4/2023    Procedure: INSERTION, GRAFT, PERICARDIUM;  Surgeon: Yuri Washington MD;  [facility];  Service: Cardiovascular;;    INSERTION OF INTRA-AORTIC BALLOON ASSIST DEVICE Right 11/21/2023    Procedure: INSERTION, INTRA-AORTIC BALLOON PUMP;  Surgeon: Finn Cohn MD;  Location: [facility] CATH LAB;  Service: Cardiology;  Laterality: Right;    LEFT VENTRICULAR ASSIST DEVICE Left 12/1/2023    Procedure: INSERTION-LEFT VENTRICULAR ASSIST DEVICE;  Surgeon: Yuri Washington MD;  [facility];  Service: Cardiovascular;  Laterality: Left;  REDO STERNOTOMY - REDO SAW NEEDED FOR CASE    LYSIS OF ADHESIONS  12/1/2023    Procedure: LYSIS, ADHESIONS;  Surgeon: Yuri Washington MD;  Location: [facility] OR [facility] FLR;  Service: Cardiovascular;;    PLACEMENT OF SWAN ROLANDO CATHETER WITH IMAGING GUIDANCE  11/20/2023    Procedure: INSERTION, CATHETER, SWAN-ROLANDO, WITH IMAGING GUIDANCE;  Surgeon: Sajan Hurley MD;   Location: [facility] CATH LAB;  Service: Cardiology;;    REMOVAL OF TUNNELED CENTRAL VENOUS CATHETER (CVC) N/A 3/1/2024    Procedure: REMOVAL, CATHETER, CENTRAL VENOUS, TUNNELED;  Surgeon: Seble Aguilar MD;  Location: [facility] CATH LAB;  Service: Interventional Nephrology;  Laterality: N/A;    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 12/1/2023    Procedure: REPAIR, ANEURYSM, ARTERY, FEMORAL;  Surgeon: Yuri Washington MD;  Location: [facility] OR [facility] FLR;  Service: Cardiovascular;  Laterality: Right;  Right Femoral Artery Repair    RIGHT HEART CATHETERIZATION Right 10/10/2023    Procedure: INSERTION, CATHETER, RIGHT HEART;  Surgeon: Bin Gandhi MD;  Location: [facility] CATH LAB;  Service: Cardiology;  Laterality: Right;    RIGHT HEART CATHETERIZATION Right 10/13/2023    Procedure: INSERTION, CATHETER, RIGHT HEART;  Surgeon: Walter Mcintyre MD;  Location: [facility] CATH LAB;  Service: Cardiology;  Laterality: Right;    RIGHT HEART CATHETERIZATION  11/13/2023    RIGHT HEART CATHETERIZATION Right 11/13/2023    Procedure: INSERTION, CATHETER, RIGHT HEART;  Surgeon: Juventino Bermudez Jr., MD;  Location: [facility] CATH LAB;  Service: Cardiology;  Laterality: Right;    RIGHT HEART CATHETERIZATION Right 11/20/2023    Procedure: INSERTION, CATHETER, RIGHT HEART;  Surgeon: Sajan Hurley MD;  Location: [facility] CATH LAB;  Service: Cardiology;  Laterality: Right;    RIGHT HEART CATHETERIZATION Right 1/22/2024    Procedure: INSERTION, CATHETER, RIGHT HEART;  Surgeon: Brayan Ocampo MD;  Location: [facility] CATH LAB;  Service: Cardiology;  Laterality: Right;    STERNAL WOUND CLOSURE N/A 12/4/2023    Procedure: CLOSURE, WOUND, STERNUM;  Surgeon: Yuri Washington MD;  Location: [facility] OR 2ND FLR;  Service: Cardiovascular;  Laterality: N/A;    STERNOTOMY N/A 12/1/2023    Procedure: STERNOTOMY, REDO;  Surgeon: Yuri Washington MD;  Location: [facility] OR [facility] FLR;  Service: Cardiovascular;  Laterality: N/A;    VALVULOPLASTY, MITRAL VALVE N/A 12/1/2023     Procedure: VALVULOPLASTY, MITRAL VALVE;  Surgeon: Yuri Washington MD;  Location: [facility] OR [address] [place] [address];  Service: Cardiovascular;  Laterality: N/A;       No family history on file.    Social History     Socioeconomic History    Marital status:    Tobacco Use    Smoking status: Former     Current packs/day: 0.50     Types: Cigarettes   Substance and Sexual Activity    Alcohol use: Yes     Comment: rarely    Drug use: No    Sexual activity: Not Currently     Partners: Female     Social Drivers of Health     Financial Resource Strain: Patient Declined (7/25/2024)    Overall Financial Resource Strain (CARDIA)     Difficulty of Paying Living Expenses: Patient declined   Food Insecurity: Patient Declined (7/25/2024)    Hunger Vital Sign     Worried About Running Out of Food in the Last Year: Patient declined     Ran Out of Food in the Last Year: Patient declined   Transportation Needs: Patient Declined (7/25/2024)    TRANSPORTATION NEEDS     Transportation : Patient declined   Physical Activity: Unknown (2/29/2024)    Exercise Vital Sign     Days of Exercise per Week: 2 days   Recent Concern: Physical Activity - Inactive (2/29/2024)    Exercise Vital Sign     Days of Exercise per Week: 2 days     Minutes of Exercise per Session: 0 min   Stress: Patient Declined (7/25/2024)    [employer] - Occupational Stress Questionnaire     Feeling of Stress : Patient declined   Housing Stability: Patient Declined (7/25/2024)    Housing Stability Vital Sign     Unable to Pay for Housing in the Last Year: Patient declined     Homeless in the Last Year: Patient declined       Time Tracking:     PT Received On: 03/11/25  PT Start Time: 1041     PT Stop Time: 1106  PT Total Time (min): 25 min     Billable Minutes: Evaluation 15 minutes and Therapeutic Exercise 10 minutes    3/11/2025

## 2025-03-11 NOTE — SUBJECTIVE & OBJECTIVE
Interval History: NAEON. HD yesterday, net UF 1112 mL, had LFA x 3 during HD, UF paused and given 200 cc NS bolus with improvement. Electrolytes today stable. Breathing comfortably on room air.     Review of patient's allergies indicates:  No Known Allergies  Current Facility-Administered Medications   Medication Frequency    0.9% NaCl infusion Once    amiodarone tablet 400 mg Daily    atorvastatin tablet 40 mg QHS    droNABinol capsule 2.5 mg BID    heparin (porcine) injection 1,000 Units PRN    mupirocin 2 % ointment BID    pregabalin capsule 100 mg Every other day    sevelamer carbonate tablet 800 mg TID WM    trazodone split tablet 25 mg QHS    venlafaxine tablet 37.5 mg Daily    warfarin (COUMADIN) split tablet 1.25 mg Every Mon, Wed, Fri    warfarin (COUMADIN) tablet 2.5 mg Every Tues, Thurs, Sat, Sun       Objective:     Vital Signs (Most Recent):  Temp: 97.8 °F (36.6 °C) (03/11/25 1153)  Pulse: 80 (03/11/25 1200)  Resp: 18 (03/11/25 1153)  BP: (!) 72/0 (03/11/25 1153)  SpO2: 99 % (03/11/25 1153) Vital Signs (24h Range):  Temp:  [97.7 °F (36.5 °C)-98.9 °F (37.2 °C)] 97.8 °F (36.6 °C)  Pulse:  [75-88] 80  Resp:  [16-18] 18  SpO2:  [92 %-99 %] 99 %  BP: (58-98)/(0-76) 72/0     Weight: 82.1 kg (181 lb) (03/11/25 1227)  Body mass index is 24.55 kg/m².  Body surface area is 2.04 meters squared.    I/O last 3 completed shifts:  In: 440 [P.O.:240; Other:200]  Out: 1812 [Other:1812]     Physical Exam  Vitals and nursing note reviewed.   Constitutional:       General: He is not in acute distress.  HENT:      Head: Normocephalic.   Eyes:      Conjunctiva/sclera: Conjunctivae normal.   Pulmonary:      Effort: Pulmonary effort is normal. No respiratory distress.   Abdominal:      Palpations: Abdomen is soft.   Musculoskeletal:      Right lower leg: No edema.      Left lower leg: No edema.   Skin:     General: Skin is warm and dry.      Coloration: Skin is not jaundiced.   Neurological:      Mental Status: He is alert.    Psychiatric:         Mood and Affect: Mood normal.          Significant Labs:  CBC:   Recent Labs   Lab 03/11/25  0639   WBC 5.78   RBC 3.82*   HGB 9.9*   HCT 33.8*      MCV 89   MCH 25.9*   MCHC 29.3*     CMP:   Recent Labs   Lab 03/10/25  1139 03/11/25  0639   GLU 98 87   CALCIUM 8.4* 8.3*   ALBUMIN 2.2*  --    PROT 7.0  --    * 134*   K 4.9 4.5   CO2 23 20*   CL 99 101   BUN 62* 36*   CREATININE 8.2* 6.2*   ALKPHOS 151*  --    *  --    *  --    BILITOT 0.6  --      All labs within the past 24 hours have been reviewed.

## 2025-03-11 NOTE — PROGRESS NOTES
03/11/2025  [place]    Current provider:  Walter Mcintyre MD    Device interrogation:      3/11/2025     8:00 AM 3/11/2025    12:40 AM 3/10/2025     8:40 PM 3/10/2025     6:55 PM 3/10/2025     6:13 PM 3/10/2025     6:12 PM 3/10/2025     6:10 PM   TXP LVAD INTERROGATIONS   Type HeartMate3 HeartMate3 HeartMate3 HeartMate3 HeartMate3 HeartMate3 HeartMate3   Flow 3.3 3.6 3.7 3.7 2.5 2.2 2.4   Speed 5000 5050 5000 5000 5000 5050 5000   PI 7.5 6.1 5.2 7.6 8.4 8.2 7.4   Power ([place]) 3.4 3.4 3.3 3.5 3.3 3.3 3.2   LSL 4600 4600 4600 4600 4600 4650 4600   Pulsatility Pulse Intermittent pulse Intermittent pulse Intermittent pulse No Pulse No Pulse No Pulse          Rounded on [facility] to ensure all mechanical assist device settings (IABP or VAD) were appropriate and all parameters were within limits.  I was able to ensure all back up equipment was present, the staff had no issues, and the Perfusion Department daily rounding was complete.      For implantable VADs: Interrogation of Ventricular assist device was performed with analysis of device parameters and review of device function. I have personally reviewed the interrogation findings and agree with findings as stated.     In emergency, the nursing units have been notified to contact the perfusion department either by:  Calling [id number] from 630am to 4pm Mon thru Fri, utilizing the On-Call Finder functionality of Epic and searching for Perfusion, or by contacting the hospital  from 4pm to 630am and on weekends and asking to speak with the perfusionist on call.    10:15 AM

## 2025-03-11 NOTE — PROGRESS NOTES
03/10/25 1900   Vital Signs   Temp 98.1 °F (36.7 °C)   Temp Source Oral   Pulse 80   Heart Rate Source Monitor   Resp 16   Device (Oxygen Therapy) room air   BP (!) 82/0   BP Location Left arm   BP Method Automatic   Patient Position Lying     HD tx completed and pt tolerated good.  BP drop at the middle of the tx .  BP came back up after 200ml of NS given.  Dialyzed for 3hrs with 1112ml fluid removed.  Pt is alert,oriented and no complaints.  Report given to the primary RN.

## 2025-03-11 NOTE — CARE UPDATE
Unit E[facility] Care Support Interaction      I have reviewed the chart of Radha Abbott who is hospitalized for <principal problem not specified>. The patient is currently located in the following unit: CSU        I have assisted the primary physician in management of the following:      C. difficile - Diarrhea present on admission and tested within 48   MRSA Decolonization - Mupirocin ordered and CHG ordered     Vero Lozada PA-C  Unit Based E[facility]

## 2025-03-11 NOTE — ASSESSMENT & PLAN NOTE
- Appears to MSK related.  - CT pelvis unremarkable.   - PT consulted. Appreciate recommendations.

## 2025-03-11 NOTE — PLAN OF CARE
Post-Acute Therapy Recommendation: low intensity     Evaluation completed today. PT goals appropriate.    Please continue Progressive Mobility Protocol as appropriate.    Appropriate transfer level with nursing staff: ambulation with assist x 1 and RW    3/11/2025        Problem: Physical Therapy  Goal: Physical Therapy Goal  Description: Goals to be met by: 3/25/2025     Patient will increase functional independence with mobility by performin. Sit to stand transfer with Modified [place]  2. Gait  x 250 feet with Supervision using RW or LRAD  3. Stand for 5 minutes with Supervision using RW or LRAD with no increase in back pain  4. Lower extremity exercise program x30 reps per handout, with independence    Outcome: Progressing

## 2025-03-11 NOTE — PROGRESS NOTES
Interdisciplinary Rounds Report:   Attended interdisciplinary rounds with the [facility]/CTS services including the LVAD Coordinators, social workers, cardiologists, surgeons,  PT/OT/Speech, dietician, and unit charge nurses. Discussed patient status, plan of care, goals of care, including DC date, and post discharge needs. Plan of care will be discussed with the patient and/or family per the physician while rounding on the floor. This is a recurring meeting that is medically and socially necessary to collaborate with the interdisciplinary team to assist patient needs and safe discharge.

## 2025-03-11 NOTE — NURSING
Notified Ac.MD pt refused blood sugar check , pt said he is not diabetic, blood sugar check order discontinued.

## 2025-03-11 NOTE — PLAN OF CARE
Pt engaged well in therapy this date.     Problem: Occupational Therapy  Goal: Occupational Therapy Goal  Description: Goals to be met by: 4/10/25     Patient will increase functional independence with ADLs by performing:    LE Dressing with Stand-by Assistance.  Grooming while standing at sink with Stand-by Assistance.  Rolling to Bilateral with [place].   Supine to sit with [place].  Step transfer with Supervision  Toilet transfer to toilet with Supervision.    Outcome: Progressing

## 2025-03-11 NOTE — PT/OT/SLP EVAL
"Occupational Therapy  Co -  Evaluation with PT    Co-evaluation/treatment performed due to patient's multiple deficits requiring two skilled therapists to appropriately and safely assess patient's strength and endurance while facilitating functional tasks in addition to accommodating for patient's activity tolerance.       Name: Radha Abbott  MRN: [id number]  Admitting Diagnosis: <principal problem not specified>  Recent Surgery: * No surgery found *      Recommendations:     Discharge Recommendations: Low Intensity Therapy  Discharge Equipment Recommendations:  none  Barriers to discharge:  None    Appropriate transfer level with RN staff: ambulation with assist x1 and RW    Assessment:     Radha Abbott is a 64 y.o. male with a medical diagnosis of left flank pain.  He presents with performance deficits affecting function: impaired endurance, impaired self care skills, impaired functional mobility, decreased lower extremity function, decreased safety awareness, pain.      Pt presents sitting upright in chair with pleasant demeanor, agreeable to therapy with wife present.  Pt and his wife report recent falls 2* back locking up at 45deg angle and pt not being able to return to upright position.  This date, pt c/o 7/10 low back pain, able to complete functional mobility with CGA and no AD demonstrating instability with NBOS on LLE with 1 posterior LOB.  PT introduced RW and pt's balance improved, able to walk in hospital hallway with SBA/RW, still with NBOS and instability especially during turns. Per PT, left flank pain appears muscular in nature, PT and writing OT educated on utilizing hot packs to assist with spasms and safety measures if low back "locks up" to prevent a fall, which includes returning to sitting position and calling for help.  Pt provided with hot packs and a pillow, as well as folded blankets for base of chair to increase height.  Patient currently demonstrates a need for low intensity therapy on a " "scheduled basis secondary to a decline in functional status due to injury, specifically to work on L flank pain.     Rehab Prognosis: Good; patient would benefit from acute skilled OT services to address these deficits and reach maximum level of function.       Plan:     Patient to be seen 2 x/week to address the above listed problems via self-care/home management, therapeutic activities, therapeutic exercises  Plan of Care Expires: 04/10/25  Plan of Care Reviewed with: patient, spouse    Subjective     Chief Complaint: "My back hurts a lot"   Patient/Family Comments/goals: Get better, return home    Occupational Profile:  Living Environment: Pt lives with spouse in [facility] with 0 JACKIE, WIS with bib & gb  Previous level of function: mod I with WC in home and occasionally RW for transfers, wife assist with some bathing occasionally  Roles and Routines:   Equipment Used at Home: bedside commode, walker, rolling, rollator, wheelchair  Assistance upon Discharge: family    Pain/Comfort:  Pain Rating 1: 7/10  Location - Side 1: Left  Location - Orientation 1: generalized  Location 1: flank  Pain Addressed 1: Distraction, Reposition  Pain Rating Post-Intervention 1: 7/10    Patients cultural, spiritual, Restoration conflicts given the current situation: no    Objective:     Communicated with: RN prior to session.  Patient found up in chair with telemetry, LVAD upon OT entry to room.    General Precautions: Standard, fall, LVAD  Orthopedic Precautions: N/A  Braces: N/A  Respiratory Status: Room air    Occupational Performance:    Bed Mobility:    Not observed this date    Functional Mobility/Transfers:  Patient completed Sit <> Stand Transfer with contact guard assistance  with  hand-held assist , mid rise hesitation  Functional Mobility:   1 - pt able to walk in hospital room with CGA to min A for NBOS and posterior LOB with HHA  2 - RW introduced and pt able to walk in hospital hallway with SBA, still with NBOS " especially during turns    Activities of Daily Living:  Lower Body Dressing: independence affixing bilateral nonslip socks to maximize fit    Cognitive/Visual Perceptual:  Cognitive/Psychosocial Skills:     -       Oriented to: AOX3 (not month)   -       Follows Commands/attention:Follows multistep  commands  -       Communication: clear/fluent  -       Memory: Impaired STM  -       Safety awareness/insight to disability: impaired   -       Mood/Affect/Coping skills/emotional control: Pleasant  Visual/Perceptual:      -Intact      Physical Exam:  Balance:    -       Sitting: Good.  Standing:  Impaired  Postural examination/scapula alignment:    -       Rounded shoulders  Skin integrity: Visible skin intact  Edema:  None noted  Sensation:    -       Intact  Motor Planning:    -       impaired  Dominant hand:    -       right  Upper Extremity Range of Motion:     -       Right Upper Extremity: WNL  -       Left Upper Extremity: WNL  Upper Extremity Strength:    -       Right Upper Extremity: WNL  -       Left Upper Extremity: WNL   Strength:    -       Right Upper Extremity: WNL  -       Left Upper Extremity: WNL  Fine Motor Coordination:    -       Intact  Neurological:   Intact    AMPAC 6 Click ADL:  AMPAC Total Score: 23    Treatment & Education:  Pt educated on role of OT, POC, and goals for therapy.    POC was dicussed with patient/caregiver, who was included in its development and is in agreement with the identified goals and treatment plan.   Patient and family aware of patient's deficits and therapy progression.   Time provided for therapeutic counseling and discussion of health disposition.   Educated on importance of EOB/OOB mobility, maintaining routine, sitting up in chair, and maximizing independence with ADLs during admission   Pt completed ADLs and functional mobility for treatment session as noted above   Pt/caregiver verbalized understanding and expressed no further concerns/questions.  Updated  communication board with level of assist required      Patient left up in chair with all lines intact, call button in reach, RN notified, and wife present    GOALS:   Multidisciplinary Problems       Occupational Therapy Goals          Problem: Occupational Therapy    Goal Priority Disciplines Outcome Interventions   Occupational Therapy Goal     OT, PT/OT Progressing    Description: Goals to be met by: 4/10/25     Patient will increase functional independence with ADLs by performing:    LE Dressing with Stand-by Assistance.  Grooming while standing at sink with Stand-by Assistance.  Rolling to Bilateral with Garrett.   Supine to sit with Garrett.  Step transfer with Supervision  Toilet transfer to toilet with Supervision.                         DME Justifications:  No DME recommended requiring DME justifications    History:     Past Medical History:   Diagnosis Date    Aspiration pneumonia of both lungs 06/17/2024    CAD (coronary artery disease)     CHF (congestive heart failure)     Delirium 06/16/2024    Diabetes mellitus     HFrEF (heart failure with reduced ejection fraction)     Hypoglycemia 06/12/2024    ICD (implantable cardioverter-defibrillator) in place     LVAD (left ventricular assist device) present 12/01/2023    MI, old     PAF (paroxysmal atrial fibrillation) 10/11/2023    Stage 4 chronic kidney disease 02/15/2024    Type 2 diabetes mellitus without complication, without long-term current use of insulin 10/07/2023         Past Surgical History:   Procedure Laterality Date    ANGIOPLASTY-VENOUS ARTERY Right 12/1/2023    Procedure: ANGIOPLASTY-VENOUS ARTERY, RIGHT FEMORAL;  Surgeon: Yuri Washington MD;  Location: [facility] OR [address] [place] [address];  Service: Cardiovascular;  Laterality: Right;    AORTIC VALVULOPLASTY N/A 12/1/2023    Procedure: REPAIR, AORTIC VALVE;  Surgeon: Yuri Washington MD;  Location: [facility] OR [address] [place] [address];  Service: Cardiovascular;  Laterality: N/A;    CARDIAC SURGERY      CLOSURE N/A  12/1/2023    Procedure: CLOSURE, TEMPORARY;  Surgeon: Yuri Washington MD;  Location: [facility] OR 2ND FLR;  Service: Cardiovascular;  Laterality: N/A;    DRAINAGE OF PLEURAL EFFUSION  12/4/2023    Procedure: DRAINAGE, PLEURAL EFFUSION;  Surgeon: Yuri Washington MD;  Location: [facility] OR 2ND FLR;  Service: Cardiovascular;;    INSERTION OF GRAFT TO PERICARDIUM  12/4/2023    Procedure: INSERTION, GRAFT, PERICARDIUM;  Surgeon: Yuri Washington MD;  Location: [facility] OR [facility] FLR;  Service: Cardiovascular;;    INSERTION OF INTRA-AORTIC BALLOON ASSIST DEVICE Right 11/21/2023    Procedure: INSERTION, INTRA-AORTIC BALLOON PUMP;  Surgeon: Finn Cohn MD;  Location: [facility] CATH LAB;  Service: Cardiology;  Laterality: Right;    LEFT VENTRICULAR ASSIST DEVICE Left 12/1/2023    Procedure: INSERTION-LEFT VENTRICULAR ASSIST DEVICE;  Surgeon: Yuri Washington MD;  [facility];  Service: Cardiovascular;  Laterality: Left;  REDO STERNOTOMY - REDO SAW NEEDED FOR CASE    LYSIS OF ADHESIONS  12/1/2023    Procedure: LYSIS, ADHESIONS;  Surgeon: Yuri Washington MD;  [facility];  Service: Cardiovascular;;    PLACEMENT OF SWAN ROLANDO CATHETER WITH IMAGING GUIDANCE  11/20/2023    Procedure: INSERTION, CATHETER, SWAN-ROLANDO, WITH IMAGING GUIDANCE;  Surgeon: Sajan Hurley MD;  Location: [facility] CATH LAB;  Service: Cardiology;;    REMOVAL OF TUNNELED CENTRAL VENOUS CATHETER (CVC) N/A 3/1/2024    Procedure: REMOVAL, CATHETER, CENTRAL VENOUS, TUNNELED;  Surgeon: Seble Aguilar MD;  Location: [facility] CATH LAB;  Service: Interventional Nephrology;  Laterality: N/A;    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 12/1/2023    Procedure: REPAIR, ANEURYSM, ARTERY, FEMORAL;  Surgeon: Yuri Washington MD;  [facility];  Service: Cardiovascular;  Laterality: Right;  Right Femoral Artery Repair    RIGHT HEART CATHETERIZATION Right 10/10/2023    Procedure: INSERTION, CATHETER, RIGHT HEART;  Surgeon: iBn Gandhi MD;   Location: [facility] CATH LAB;  Service: Cardiology;  Laterality: Right;    RIGHT HEART CATHETERIZATION Right 10/13/2023    Procedure: INSERTION, CATHETER, RIGHT HEART;  Surgeon: Walter Mcintyre MD;  Location: [facility] CATH LAB;  Service: Cardiology;  Laterality: Right;    RIGHT HEART CATHETERIZATION  11/13/2023    RIGHT HEART CATHETERIZATION Right 11/13/2023    Procedure: INSERTION, CATHETER, RIGHT HEART;  Surgeon: Juventino Bermudez Jr., MD;  Location: [facility] CATH LAB;  Service: Cardiology;  Laterality: Right;    RIGHT HEART CATHETERIZATION Right 11/20/2023    Procedure: INSERTION, CATHETER, RIGHT HEART;  Surgeon: Sajan Hurley MD;  Location: [facility] CATH LAB;  Service: Cardiology;  Laterality: Right;    RIGHT HEART CATHETERIZATION Right 1/22/2024    Procedure: INSERTION, CATHETER, RIGHT HEART;  Surgeon: Brayan Ocampo MD;  Location: [facility] CATH LAB;  Service: Cardiology;  Laterality: Right;    STERNAL WOUND CLOSURE N/A 12/4/2023    Procedure: CLOSURE, WOUND, STERNUM;  Surgeon: Yuri Washington MD;  [facility];  Service: Cardiovascular;  Laterality: N/A;    STERNOTOMY N/A 12/1/2023    Procedure: STERNOTOMY, REDO;  Surgeon: Yuri Washington MD;  [facility];  Service: Cardiovascular;  Laterality: N/A;    VALVULOPLASTY, MITRAL VALVE N/A 12/1/2023    Procedure: VALVULOPLASTY, MITRAL VALVE;  Surgeon: Yuri Washington MD;  Location: [facility] OR [facility] FLR;  Service: Cardiovascular;  Laterality: N/A;       Time Tracking:     OT Date of Treatment: 03/11/25  OT Start Time: 1041  OT Stop Time: 1106  OT Total Time (min): 25 min    Billable Minutes:Evaluation 8  Therapeutic Activity 17    3/11/2025

## 2025-03-11 NOTE — SUBJECTIVE & OBJECTIVE
Interval History: NAEON. No acute complaints. Pending PT evaluation.     Continuous Infusions:  Scheduled Meds:   0.9% NaCl   Intravenous Once    amiodarone  400 mg Oral Daily    atorvastatin  40 mg Oral QHS    mupirocin   Nasal BID    pregabalin  100 mg Oral Every other day    sevelamer carbonate  800 mg Oral TID WM    traZODone  25 mg Oral QHS    venlafaxine  37.5 mg Oral Daily    warfarin  1.25 mg Oral Every Mon, Wed, Fri    warfarin  2.5 mg Oral Every Tues, Thurs, Sat, Sun     PRN Meds:  Current Facility-Administered Medications:     heparin (porcine), 1,000 Units, Intra-Catheter, PRN    Review of patient's allergies indicates:  No Known Allergies  Objective:     Vital Signs (Most Recent):  Temp: 98.3 °F (36.8 °C) (03/11/25 0750)  Pulse: 76 (03/11/25 0750)  Resp: 18 (03/11/25 0750)  BP: (!) 88/60 (03/11/25 0752)  SpO2: 95 % (03/11/25 0750) Vital Signs (24h Range):  Temp:  [97.7 °F (36.5 °C)-98.9 °F (37.2 °C)] 98.3 °F (36.8 °C)  Pulse:  [75-82] 76  Resp:  [16-18] 18  SpO2:  [92 %-98 %] 95 %  BP: ()/(0-76) 88/60     Patient Vitals for the past 72 hrs (Last 3 readings):   Weight   03/11/25 0415 80 kg (176 lb 5.9 oz)   03/10/25 1220 83.8 kg (184 lb 11.9 oz)     Body mass index is 23.92 kg/m².      Intake/Output Summary (Last 24 hours) at 3/11/2025 0858  Last data filed at 3/10/2025 2318  Gross per 24 hour   Intake 440 ml   Output 1812 ml   Net -1372 ml       Hemodynamic Parameters:            Physical Exam  Constitutional:       General: He is not in acute distress.     Appearance: Normal appearance.   HENT:      Head: Normocephalic and atraumatic.   Eyes:      Conjunctiva/sclera: Conjunctivae normal.   Neck:      Vascular: No hepatojugular reflux or JVD.   Cardiovascular:      Comments: VAD hum appreciated  Pulmonary:      Breath sounds: Normal breath sounds.      Comments: Clear to auscultation  Abdominal:      Comments: Soft, non-tender, non-distended   Musculoskeletal:      Cervical back: Normal range of  motion.      Comments: No peripheral edema   Skin:     General: Skin is warm and dry.      Capillary Refill: Capillary refill takes less than 2 seconds.   Neurological:      Mental Status: He is alert and oriented to person, place, and time.   Psychiatric:         Mood and Affect: Mood and affect normal.            Significant Labs:  CBC:  Recent Labs   Lab 03/09/25  2023 03/10/25  1139 03/11/25  0639   WBC 8.07 8.77 5.78   RBC 4.28* 3.94* 3.82*   HGB 11.3* 10.5* 9.9*   HCT 37.3* 34.6* 33.8*    211 212   MCV 87 88 89   MCH 26.4* 26.6* 25.9*   MCHC 30.3* 30.3* 29.3*     BNP:  Recent Labs   Lab 03/09/25  2023 03/10/25  1139   * 1,231*     CMP:  Recent Labs   Lab 03/09/25  2023 03/10/25  1139 03/11/25  0639   * 98 87   CALCIUM 8.9 8.4* 8.3*   ALBUMIN 2.8* 2.2*  --    PROT 8.0 7.0  --    * 133* 134*   K 5.0 4.9 4.5   CO2 19* 23 20*   CL 97 99 101   BUN 52* 62* 36*   CREATININE 8.5* 8.2* 6.2*   ALKPHOS 161* 151*  --    * 199*  --    * 221*  --    BILITOT 0.6 0.6  --       Coagulation:   Recent Labs   Lab 03/09/25  2023 03/10/25  1139 03/11/25  0639   INR 2.6* 3.0* 2.7*   APTT 35.7*  --  40.0*     LDH:  Recent Labs   Lab 03/11/25  0639   *     Microbiology:  Microbiology Results (last 7 days)       Procedure Component Value Units Date/Time    Clostridium difficile EIA [[id number]]     Order Status: No result Specimen: Stool             I have reviewed all pertinent labs within the past 24 hours.    Estimated Creatinine Clearance: 13.6 mL/min (A) (based on SCr of 6.2 mg/dL (H)).    Diagnostic Results:  I have reviewed all pertinent imaging results/findings within the past 24 hours.

## 2025-03-11 NOTE — ASSESSMENT & PLAN NOTE
64 year old male hx of ESRD on HD MWF. Last HD Friday 3/7/25. Nephrology consulted for management of ESRD.     Nephrology History  -Outpatient HD unit: [facility]  -Nephrologist: ?  -HD tx days: MWF  -HD prescription: 3 hrs, F160, 350/800  -Last HD tx: 3/7/25  -HD access: R CVC  -HD modality: iHD  -Residual urine: minimal  -EDW:  81 kg    Plan/Recommendations  ESRD on HD:  -No need for emergent RRT, electrolytes stable. Plan for HD tomorrow to continue MWF schedule.  -Will continue iHD thrice weekly unless emergent indication arises  -Strict I&Os  -Pre & post HD weight  Anemia in ESRD  -Hgb goal 10-11, hgb at goal  Mineral Bone Disease in ESRD  -Continue phos binders  -Renal diet if not NPO

## 2025-03-11 NOTE — PROGRESS NOTES
Follow-up     Elizabeth PT, informed SW of her recommendation for OP PT. Secure chat Dr. Shen and RICHIE Flores, PT's recommendation and pt's choice for [facility] ([phone]). Sandra stated that she will place orders for PT and OT. MDT completed. Pt's STANLEY scheduled for tomorrow, 3/12/25. Met with pt and pt's spouse, Arlyn, in pt's room to inform of anticipated dc tomorrow and discuss OP PT and OT. PT was AAOx4 with pleasant affect. Pt was sitting in chair. Pt and pt's spouse were amenable to dc tomorrow. Pt's spouse stated that she received a call from  at [facility] to schedule evals; however, she was unaware of pt's dc date at that time. Pt's spouse stated that she will call back to schedule pt's evals. SW also f/u with Sulema at [facility] ([phone]) to inform of pt's anticipated dc tomorrow. Sulema requested fax ([phone]) of pt's dc summary once completed. Pt will resume OP HD on 3/14/25 at 1st shift (6:30 am), per Sulema. Above added to AVS. Pt voiced understanding of plan of care. Pt reports coping well and denies further needs, questions, concerns at this time and none indicated. Providing ongoing psychosocial and counseling support, education, resources, assistance and discharge planning as indicated. Following and available.

## 2025-03-11 NOTE — CARE UPDATE
-Glucose Goal 140-180    -A1C:   Hemoglobin A1C   Date Value Ref Range Status   03/10/2025 6.5 (H) 4.0 - 5.6 % Final     Comment:     ADA Screening Guidelines:  5.7-6.4%  Consistent with prediabetes  >or=6.5%  Consistent with diabetes    High levels of fetal hemoglobin interfere with the HbA1C  assay. Heterozygous hemoglobin variants (HbS, HgC, etc)do  not significantly interfere with this assay.   However, presence of multiple variants may affect accuracy.           -HOME REGIMEN:     -GLUCOSE TREND FOR THE PAST 24HRS:   Recent Labs   Lab 03/09/25  1917 03/09/25  2157 03/10/25  1957   POCTGLUCOSE 156* 116* 140*         -NO HYPOGYCEMIAS NOTED     - Diet  Diet Cardiac Fluid - 1500mL; Standard Tray    BG checks discontinued by primary team. A1c at goal not on meds and sugars stable here.    Endocrine will sign off

## 2025-03-12 VITALS
HEIGHT: 72 IN | OXYGEN SATURATION: 97 % | HEART RATE: 72 BPM | RESPIRATION RATE: 18 BRPM | WEIGHT: 180.75 LBS | SYSTOLIC BLOOD PRESSURE: 86 MMHG | BODY MASS INDEX: 24.48 KG/M2 | TEMPERATURE: 98 F

## 2025-03-12 LAB
APTT PPP: 48.2 SEC (ref 21–32)
BASOPHILS # BLD AUTO: 0.03 K/UL (ref 0–0.2)
BASOPHILS NFR BLD: 0.6 % (ref 0–1.9)
BNP SERPL-MCNC: 145 PG/ML (ref 0–99)
C DIFF GDH STL QL: NEGATIVE
C DIFF TOX A+B STL QL IA: NEGATIVE
DIFFERENTIAL METHOD BLD: ABNORMAL
EOSINOPHIL # BLD AUTO: 0.2 K/UL (ref 0–0.5)
EOSINOPHIL NFR BLD: 4.6 % (ref 0–8)
ERYTHROCYTE [DISTWIDTH] IN BLOOD BY AUTOMATED COUNT: 17.1 % (ref 11.5–14.5)
HCT VFR BLD AUTO: 32.6 % (ref 40–54)
HGB BLD-MCNC: 9.6 G/DL (ref 14–18)
IMM GRANULOCYTES # BLD AUTO: 0.07 K/UL (ref 0–0.04)
IMM GRANULOCYTES NFR BLD AUTO: 1.4 % (ref 0–0.5)
INR PPP: 2.7 (ref 0.8–1.2)
LDH SERPL L TO P-CCNC: 373 U/L (ref 110–260)
LYMPHOCYTES # BLD AUTO: 0.6 K/UL (ref 1–4.8)
LYMPHOCYTES NFR BLD: 12.9 % (ref 18–48)
MAGNESIUM SERPL-MCNC: 2 MG/DL (ref 1.6–2.6)
MCH RBC QN AUTO: 26 PG (ref 27–31)
MCHC RBC AUTO-ENTMCNC: 29.4 G/DL (ref 32–36)
MCV RBC AUTO: 88 FL (ref 82–98)
MONOCYTES # BLD AUTO: 0.4 K/UL (ref 0.3–1)
MONOCYTES NFR BLD: 8.9 % (ref 4–15)
NEUTROPHILS # BLD AUTO: 3.6 K/UL (ref 1.8–7.7)
NEUTROPHILS NFR BLD: 71.6 % (ref 38–73)
NRBC BLD-RTO: 0 /100 WBC
PLATELET # BLD AUTO: 225 K/UL (ref 150–450)
PMV BLD AUTO: 10.1 FL (ref 9.2–12.9)
PROTHROMBIN TIME: 27.1 SEC (ref 9–12.5)
RBC # BLD AUTO: 3.69 M/UL (ref 4.6–6.2)
WBC # BLD AUTO: 4.97 K/UL (ref 3.9–12.7)

## 2025-03-12 PROCEDURE — 85610 PROTHROMBIN TIME: CPT | Performed by: STUDENT IN AN ORGANIZED HEALTH CARE EDUCATION/TRAINING PROGRAM

## 2025-03-12 PROCEDURE — 85025 COMPLETE CBC W/AUTO DIFF WBC: CPT | Performed by: STUDENT IN AN ORGANIZED HEALTH CARE EDUCATION/TRAINING PROGRAM

## 2025-03-12 PROCEDURE — 87449 NOS EACH ORGANISM AG IA: CPT | Performed by: PHYSICIAN ASSISTANT

## 2025-03-12 PROCEDURE — 85730 THROMBOPLASTIN TIME PARTIAL: CPT | Performed by: STUDENT IN AN ORGANIZED HEALTH CARE EDUCATION/TRAINING PROGRAM

## 2025-03-12 PROCEDURE — 83880 ASSAY OF NATRIURETIC PEPTIDE: CPT | Performed by: STUDENT IN AN ORGANIZED HEALTH CARE EDUCATION/TRAINING PROGRAM

## 2025-03-12 PROCEDURE — 83615 LACTATE (LD) (LDH) ENZYME: CPT | Performed by: STUDENT IN AN ORGANIZED HEALTH CARE EDUCATION/TRAINING PROGRAM

## 2025-03-12 PROCEDURE — 27000248 HC VAD-ADDITIONAL DAY

## 2025-03-12 PROCEDURE — 63600175 PHARM REV CODE 636 W HCPCS: Performed by: INTERNAL MEDICINE

## 2025-03-12 PROCEDURE — 25000003 PHARM REV CODE 250: Performed by: STUDENT IN AN ORGANIZED HEALTH CARE EDUCATION/TRAINING PROGRAM

## 2025-03-12 PROCEDURE — 99233 SBSQ HOSP IP/OBS HIGH 50: CPT | Mod: ,,, | Performed by: INTERNAL MEDICINE

## 2025-03-12 PROCEDURE — 93750 INTERROGATION VAD IN PERSON: CPT | Mod: ,,, | Performed by: INTERNAL MEDICINE

## 2025-03-12 PROCEDURE — 80100014 HC HEMODIALYSIS 1:1

## 2025-03-12 PROCEDURE — 83735 ASSAY OF MAGNESIUM: CPT | Performed by: STUDENT IN AN ORGANIZED HEALTH CARE EDUCATION/TRAINING PROGRAM

## 2025-03-12 RX ORDER — WARFARIN 2.5 MG/1
TABLET ORAL
Qty: 45 TABLET | Refills: 0 | Status: SHIPPED | OUTPATIENT
Start: 2025-03-12

## 2025-03-12 RX ORDER — AMITRIPTYLINE HYDROCHLORIDE 25 MG/1
25 TABLET, FILM COATED ORAL
Qty: 12 TABLET | Refills: 11 | Status: SHIPPED | OUTPATIENT
Start: 2025-03-12 | End: 2026-03-12

## 2025-03-12 RX ADMIN — WARFARIN SODIUM 1.25 MG: 2.5 TABLET ORAL at 05:03

## 2025-03-12 RX ADMIN — SEVELAMER CARBONATE 800 MG: 800 TABLET, FILM COATED ORAL at 02:03

## 2025-03-12 RX ADMIN — AMIODARONE HYDROCHLORIDE 400 MG: 200 TABLET ORAL at 10:03

## 2025-03-12 RX ADMIN — MUPIROCIN: 20 OINTMENT TOPICAL at 10:03

## 2025-03-12 RX ADMIN — DRONABINOL 2.5 MG: 2.5 CAPSULE ORAL at 10:03

## 2025-03-12 RX ADMIN — VENLAFAXINE 37.5 MG: 37.5 TABLET ORAL at 10:03

## 2025-03-12 RX ADMIN — SEVELAMER CARBONATE 800 MG: 800 TABLET, FILM COATED ORAL at 10:03

## 2025-03-12 NOTE — PROGRESS NOTES
03/12/2025  [place] Duarte    Current provider:  Walter Mcintyre MD    Device interrogation:      3/12/2025     4:30 AM 3/12/2025    12:12 AM 3/11/2025     8:23 PM 3/11/2025     4:00 PM 3/11/2025    11:53 AM 3/11/2025     8:00 AM 3/11/2025    12:40 AM   TXP LVAD INTERROGATIONS   Type HeartMate3 HeartMate3 HeartMate3 HeartMate3 HeartMate3 HeartMate3 HeartMate3   Flow 3.8 3.4 3.2 3.6 3.3 3.3 3.6   Speed 5000 5000 5000 5000 5000 5000 5050   PI 4.5 7.4 5.2 6.4 6.3 7.5 6.1   Power ([place]) 3.3 3.4 3.4 3.4 3 3.4 3.4   LSL 4600 4600 4600 4600 4600 4600 4600   Pulsatility Intermittent pulse Intermittent pulse Intermittent pulse Intermittent pulse Intermittent pulse Pulse Intermittent pulse          Rounded on [facility] to ensure all mechanical assist device settings (IABP or VAD) were appropriate and all parameters were within limits.  I was able to ensure all back up equipment was present, the staff had no issues, and the Perfusion Department daily rounding was complete.      For implantable VADs: Interrogation of Ventricular assist device was performed with analysis of device parameters and review of device function. I have personally reviewed the interrogation findings and agree with findings as stated.     In emergency, the nursing units have been notified to contact the perfusion department either by:  Calling [phone] from 630am to 4pm Mon thru Fri, utilizing the On-Call Finder functionality of Epic and searching for Perfusion, or by contacting the hospital  from 4pm to 630am and on weekends and asking to speak with the perfusionist on call.    10:40 AM

## 2025-03-12 NOTE — PROGRESS NOTES
Dc Note     Met with pt in pt's room to discuss his dc today. Pt was AAOx4 with pleasant affect. Pt was looking forward to dc home. Pt stated that his spouse, Arlyn, will f/u with [facility] ([phone]) regarding PT and OT evals. Informed pt that he will resume OP HD at [facility] ([phone]) on Friday, 3/14/25, same chair time (6:30 am). Faxed ([phone]) required dc clinicals to [facility]. Later, f/u with Shadi at [facility] to confirm pt's dc today and inquired about receipt of fax. Shadi confirmed that fax was received. Pt did not verbalize any further dc needs/concerns. Pt will have support of his spouse post-dc. Pt reports coping well and denies further needs, questions, concerns at this time and none indicated. Providing psychosocial and counseling support, education, resources, assistance and discharge planning as indicated. Notified pt's nurse, Annette, of pt's dc today, and pt's spouse will pickup pt to transport home around 5:00 pm, per pt. SW remains available.

## 2025-03-12 NOTE — PROGRESS NOTES
DISCHARGE NOTE:    Radha Abbott is a 64 y.o. male s/p HM3 lvad from 12.1.2023 admitted for evaluation of a fall.     Past Medical History:   Diagnosis Date    Aspiration pneumonia of both lungs 06/17/2024    CAD (coronary artery disease)     CHF (congestive heart failure)     Delirium 06/16/2024    Diabetes mellitus     HFrEF (heart failure with reduced ejection fraction)     Hypoglycemia 06/12/2024    ICD (implantable cardioverter-defibrillator) in place     LVAD (left ventricular assist device) present 12/01/2023    MI, old     PAF (paroxysmal atrial fibrillation) 10/11/2023    Stage 4 chronic kidney disease 02/15/2024    Type 2 diabetes mellitus without complication, without long-term current use of insulin 10/07/2023       Hospital Course: During his hospital stay Mr. Abbott was evaluated for left flank pain. He underwent abdominal CT. He was started on amitriptyline as an appetite stimulant.     His inr today is 2.7. Plan to continue previous home dose of warfarin 1.25mg on MWF and 2.5mg on other days.     Pharmacy Interventions/Recommendations:  1) INR Goal: 2-3    2) Antiplatelet Agents: None    3) Heparin Bridging:  UFH    4) INR Follow-Up/Discharge Needs:  Monday with [facility]     See list of discharge medication for dosing instructions.     Radha Abbott and his caregiver verbalized their understanding and had the opportunity to ask questions.      Discharge Medications:     Medication List        START taking these medications      amitriptyline 25 MG tablet  Commonly known as: ELAVIL  Take 1 tablet (25 mg total) by mouth 3 (three) times a week.            CHANGE how you take these medications      warfarin 2.5 MG tablet  Commonly known as: COUMADIN  Take 1.25 mg orally daily on Monday, Wednesday and Fridays. Take 2.5mg daily orally on all other days.  What changed: See the new instructions.            CONTINUE taking these medications      amiodarone 400 MG tablet  Commonly known as: PACERONE  Take 1  tablet (400 mg total) by mouth once daily.     atorvastatin 40 MG tablet  Commonly known as: LIPITOR  Take 1 tablet (40 mg total) by mouth every evening.     Fish OiL 1,000 (120-180) mg Cap  Generic drug: omega 3-dha-epa-fish oil     pregabalin 100 MG capsule  Commonly known as: LYRICA     pulse oximeter device  Commonly known as: pulse oximeter  by Apply Externally route 2 (two) times a day. Use twice daily at 8 AM and 3 PM and [facility] the [facility] as directed.     sevelamer carbonate 800 mg Tab  Commonly known as: RENVELA     traZODone 50 MG tablet  Commonly known as: DESYREL  Take 0.5 tablets (25 mg total) by mouth every evening.     venlafaxine 37.5 MG Tab  Commonly known as: EFFEXOR  Take 1 tablet by mouth once daily     vitamin D 1000 units Tab  Commonly known as: VITAMIN D3  Take 1 tablet by mouth once daily            STOP taking these medications      loratadine 10 mg tablet  Commonly known as: CLARITIN               Where to Get Your Medications        These medications were sent to [facility] - [place] - [address]  [address] [place] [address]      Phone: [phone]   amitriptyline 25 MG tablet  warfarin 2.5 MG tablet

## 2025-03-12 NOTE — SUBJECTIVE & OBJECTIVE
Interval History: NAEON. No acute complaints. Plan for discharge today.     Continuous Infusions:  Scheduled Meds:   0.9% NaCl   Intravenous Once    amiodarone  400 mg Oral Daily    atorvastatin  40 mg Oral QHS    droNABinol  2.5 mg Oral BID    mupirocin   Nasal BID    pregabalin  100 mg Oral Every other day    sevelamer carbonate  800 mg Oral TID WM    traZODone  25 mg Oral QHS    venlafaxine  37.5 mg Oral Daily    warfarin  1.25 mg Oral Every Mon, Wed, Fri    warfarin  2.5 mg Oral Every Tues, Thurs, Sat, Sun     PRN Meds:  Current Facility-Administered Medications:     heparin (porcine), 1,000 Units, Intra-Catheter, PRN    Review of patient's allergies indicates:  No Known Allergies  Objective:     Vital Signs (Most Recent):  Temp: 98.3 °F (36.8 °C) (03/12/25 0845)  Pulse: 78 (03/12/25 0845)  Resp: 18 (03/12/25 0900)  BP: (!) 78/0 (03/12/25 0900)  SpO2: 100 % (03/12/25 0900) Vital Signs (24h Range):  Temp:  [97.8 °F (36.6 °C)-98.3 °F (36.8 °C)] 98.3 °F (36.8 °C)  Pulse:  [70-88] 78  Resp:  [16-18] 18  SpO2:  [97 %-100 %] 100 %  BP: (72-92)/(0) 78/0     Patient Vitals for the past 72 hrs (Last 3 readings):   Weight   03/12/25 0424 82 kg (180 lb 12.4 oz)   03/11/25 1227 82.1 kg (181 lb)   03/10/25 1220 83.8 kg (184 lb 11.9 oz)     Body mass index is 24.52 kg/m².      Intake/Output Summary (Last 24 hours) at 3/12/2025 0915  Last data filed at 3/11/2025 2000  Gross per 24 hour   Intake 660 ml   Output --   Net 660 ml       Hemodynamic Parameters:            Physical Exam  Constitutional:       General: He is not in acute distress.     Appearance: Normal appearance.   HENT:      Head: Normocephalic and atraumatic.   Eyes:      Conjunctiva/sclera: Conjunctivae normal.   Neck:      Vascular: No hepatojugular reflux or JVD.   Cardiovascular:      Comments: VAD hum appreciated  Pulmonary:      Breath sounds: Normal breath sounds.      Comments: Clear to auscultation  Abdominal:      Comments: Soft, non-tender,  non-distended   Musculoskeletal:      Cervical back: Normal range of motion.      Comments: No peripheral edema   Skin:     General: Skin is warm and dry.      Capillary Refill: Capillary refill takes less than 2 seconds.   Neurological:      Mental Status: He is alert and oriented to person, place, and time.   Psychiatric:         Mood and Affect: Mood and affect normal.            Significant Labs:  CBC:  Recent Labs   Lab 03/10/25  1139 03/11/25  0639 03/12/25  0651   WBC 8.77 5.78 4.97   RBC 3.94* 3.82* 3.69*   HGB 10.5* 9.9* 9.6*   HCT 34.6* 33.8* 32.6*    212 225   MCV 88 89 88   MCH 26.6* 25.9* 26.0*   MCHC 30.3* 29.3* 29.4*     BNP:  Recent Labs   Lab 03/09/25  2023 03/10/25  1139 03/12/25  0651   * 1,231* 145*     CMP:  Recent Labs   Lab 03/09/25  2023 03/10/25  1139 03/11/25  0639   * 98 87   CALCIUM 8.9 8.4* 8.3*   ALBUMIN 2.8* 2.2*  --    PROT 8.0 7.0  --    * 133* 134*   K 5.0 4.9 4.5   CO2 19* 23 20*   CL 97 99 101   BUN 52* 62* 36*   CREATININE 8.5* 8.2* 6.2*   ALKPHOS 161* 151*  --    * 199*  --    * 221*  --    BILITOT 0.6 0.6  --       Coagulation:   Recent Labs   Lab 03/09/25  2023 03/10/25  1139 03/11/25  0639 03/12/25  0652   INR 2.6* 3.0* 2.7* 2.7*   APTT 35.7*  --  40.0* 48.2*     LDH:  Recent Labs   Lab 03/11/25 0639 03/12/25  0651   * 373*     Microbiology:  Microbiology Results (last 7 days)       Procedure Component Value Units Date/Time    Clostridium difficile EIA [[id number]] Collected: 03/12/25 0626    Order Status: Sent Specimen: Stool Updated: 03/12/25 0627            I have reviewed all pertinent labs within the past 24 hours.    Estimated Creatinine Clearance: 13.6 mL/min (A) (based on SCr of 6.2 mg/dL (H)).    Diagnostic Results:  I have reviewed all pertinent imaging results/findings within the past 24 hours.

## 2025-03-12 NOTE — PLAN OF CARE
Problem: Adult Inpatient Plan of Care  Goal: Plan of Care Review  Outcome: Progressing  Flowsheets (Taken 3/12/2025 1303)  Plan of Care Reviewed With: patient  Goal: Patient-Specific Goal (Individualized)  Outcome: Progressing  Goal: Absence of Hospital-Acquired Illness or Injury  Outcome: Progressing  Intervention: Identify and Manage Fall Risk  Flowsheets (Taken 3/12/2025 1303)  Safety Promotion/Fall Prevention: assistive device/personal item within reach  Intervention: Prevent Skin Injury  Flowsheets (Taken 3/12/2025 1303)  Body Position: position changed independently  Intervention: Prevent and Manage VTE (Venous Thromboembolism) Risk  Flowsheets (Taken 3/12/2025 1303)  VTE Prevention/Management:   ambulation promoted   ROM (passive) performed  Intervention: Prevent Infection  Flowsheets (Taken 3/12/2025 1303)  Infection Prevention:   cohorting utilized   environmental surveillance performed  Goal: Optimal Comfort and Wellbeing  Outcome: Progressing  Intervention: Monitor Pain and Promote Comfort  Flowsheets (Taken 3/12/2025 1303)  Pain Management Interventions: around-the-clock dosing utilized  Intervention: Provide Person-Centered Care  Flowsheets (Taken 3/12/2025 1303)  Trust Relationship/Rapport:   empathic listening provided   choices provided  Goal: Readiness for Transition of Care  Outcome: Progressing     Problem: Diabetes Comorbidity  Goal: Blood Glucose Level Within Targeted Range  Outcome: Progressing     Problem: Infection  Goal: Absence of Infection Signs and Symptoms  Outcome: Progressing  Intervention: Prevent or Manage Infection  Flowsheets (Taken 3/12/2025 1303)  Isolation Precautions: precautions maintained     Problem: Ventricular Assist Device  Goal: Optimal Adjustment to Device  Outcome: Progressing  Intervention: Optimize Psychosocial Response to VAD  Flowsheets (Taken 3/12/2025 1303)  Supportive Measures: active listening utilized  Goal: Absence of Bleeding  Outcome: Progressing  Goal:  Absence of Embolism Signs and Symptoms  Outcome: Progressing  Intervention: Prevent or Manage Embolism  Flowsheets (Taken 3/12/2025 1303)  VTE Prevention/Management:   ambulation promoted   ROM (passive) performed  Goal: Optimal Blood Flow  Outcome: Progressing  Goal: Absence of Infection Signs and Symptoms  Outcome: Progressing  Goal: Effective Right-Sided Heart Function  Outcome: Progressing     Problem: Hemodialysis  Goal: Safe, Effective Therapy Delivery  Outcome: Progressing  Goal: Effective Tissue Perfusion  Outcome: Progressing  Goal: Absence of Infection Signs and Symptoms  Outcome: Progressing     Problem: Fall Injury Risk  Goal: Absence of Fall and Fall-Related Injury  Outcome: Progressing

## 2025-03-12 NOTE — PROGRESS NOTES
[facility]FF HEMODIALYSIS NOTE     Patient currently on hemodialysis for removal of uremic toxins and volume.     Patient seen and evaluated on hemodialysis, tolerating treatment, see HD flowsheet for vitals and assessments.    Labs have been reviewed and the dialysate bath has been adjusted.       Assessment/Plan:    ESRD  -Patient seen on HD, tolerating treatment well, w/o complaints   -UF goal reduced due to LVAD with low flow alarms.   EDW - 81 kg , post HD standing weight 81.3 kg   -Renal diet, if not NPO   -Strict I/O's and daily weights  -Daily renal function panels  -Keep MAP >65 while on HD   -Hgb goal 10-11  -Will continue to follow while inpatient     Yolanda Cai DNP-FNP, C  Nephrology  Pager: [phone]

## 2025-03-12 NOTE — PROGRESS NOTES
[facility] - Cardiac Intensive Care  Heart Transplant  Progress Note    Patient Name: Radha Abbott  MRN: [id number]  Admission Date: 3/10/2025  Hospital Length of Stay: 2 days  Attending Physician: Walter Mcintyre MD  Primary Care Provider: Vasu Kong MD  Principal Problem:<principal problem not specified>    Subjective:   Interval History: NAEON. No acute complaints. Plan for discharge today.     Continuous Infusions:  Scheduled Meds:   0.9% NaCl   Intravenous Once    amiodarone  400 mg Oral Daily    atorvastatin  40 mg Oral QHS    droNABinol  2.5 mg Oral BID    mupirocin   Nasal BID    pregabalin  100 mg Oral Every other day    sevelamer carbonate  800 mg Oral TID WM    traZODone  25 mg Oral QHS    venlafaxine  37.5 mg Oral Daily    warfarin  1.25 mg Oral Every Mon, Wed, Fri    warfarin  2.5 mg Oral Every Tues, Thurs, Sat, Sun     PRN Meds:  Current Facility-Administered Medications:     heparin (porcine), 1,000 Units, Intra-Catheter, PRN    Review of patient's allergies indicates:  No Known Allergies  Objective:     Vital Signs (Most Recent):  Temp: 98.3 °F (36.8 °C) (03/12/25 0845)  Pulse: 78 (03/12/25 0845)  Resp: 18 (03/12/25 0900)  BP: (!) 78/0 (03/12/25 0900)  SpO2: 100 % (03/12/25 0900) Vital Signs (24h Range):  Temp:  [97.8 °F (36.6 °C)-98.3 °F (36.8 °C)] 98.3 °F (36.8 °C)  Pulse:  [70-88] 78  Resp:  [16-18] 18  SpO2:  [97 %-100 %] 100 %  BP: (72-92)/(0) 78/0     Patient Vitals for the past 72 hrs (Last 3 readings):   Weight   03/12/25 0424 82 kg (180 lb 12.4 oz)   03/11/25 1227 82.1 kg (181 lb)   03/10/25 1220 83.8 kg (184 lb 11.9 oz)     Body mass index is 24.52 kg/m².      Intake/Output Summary (Last 24 hours) at 3/12/2025 0915  Last data filed at 3/11/2025 2000  Gross per 24 hour   Intake 660 ml   Output --   Net 660 ml       Hemodynamic Parameters:            Physical Exam  Constitutional:       General: He is not in acute distress.     Appearance: Normal appearance.   HENT:      Head:  Normocephalic and atraumatic.   Eyes:      Conjunctiva/sclera: Conjunctivae normal.   Neck:      Vascular: No hepatojugular reflux or JVD.   Cardiovascular:      Comments: VAD hum appreciated  Pulmonary:      Breath sounds: Normal breath sounds.      Comments: Clear to auscultation  Abdominal:      Comments: Soft, non-tender, non-distended   Musculoskeletal:      Cervical back: Normal range of motion.      Comments: No peripheral edema   Skin:     General: Skin is warm and dry.      Capillary Refill: Capillary refill takes less than 2 seconds.   Neurological:      Mental Status: He is alert and oriented to person, place, and time.   Psychiatric:         Mood and Affect: Mood and affect normal.            Significant Labs:  CBC:  Recent Labs   Lab 03/10/25  1139 03/11/25  0639 03/12/25  0651   WBC 8.77 5.78 4.97   RBC 3.94* 3.82* 3.69*   HGB 10.5* 9.9* 9.6*   HCT 34.6* 33.8* 32.6*    212 225   MCV 88 89 88   MCH 26.6* 25.9* 26.0*   MCHC 30.3* 29.3* 29.4*     BNP:  Recent Labs   Lab 03/09/25  2023 03/10/25  1139 03/12/25  0651   * 1,231* 145*     CMP:  Recent Labs   Lab 03/09/25  2023 03/10/25  1139 03/11/25  0639   * 98 87   CALCIUM 8.9 8.4* 8.3*   ALBUMIN 2.8* 2.2*  --    PROT 8.0 7.0  --    * 133* 134*   K 5.0 4.9 4.5   CO2 19* 23 20*   CL 97 99 101   BUN 52* 62* 36*   CREATININE 8.5* 8.2* 6.2*   ALKPHOS 161* 151*  --    * 199*  --    * 221*  --    BILITOT 0.6 0.6  --       Coagulation:   Recent Labs   Lab 03/09/25  2023 03/10/25  1139 03/11/25  0639 03/12/25  0652   INR 2.6* 3.0* 2.7* 2.7*   APTT 35.7*  --  40.0* 48.2*     LDH:  Recent Labs   Lab 03/11/25 0639 03/12/25 0651   * 373*     Microbiology:  Microbiology Results (last 7 days)       Procedure Component Value Units Date/Time    Clostridium difficile EIA [[id number]] Collected: 03/12/25 0626    Order Status: Sent Specimen: Stool Updated: 03/12/25 0627            I have reviewed all pertinent labs within the  past 24 hours.    Estimated Creatinine Clearance: 13.6 mL/min (A) (based on SCr of 6.2 mg/dL (H)).    Diagnostic Results:  I have reviewed all pertinent imaging results/findings within the past 24 hours.  Assessment and Plan:     Mr. Radha Abbott is a 63 y/o AAM w/ PMHx of stage D HFrEF, ICM s/p HM3 as DT (12/1/2023), ESRD on HD (MWF), and debility who presented to ED in [place] w/ complaints of  falls x2. States when he leans over at 45 degree angle he is not able to lift himself back up leading to his falls. States he has been having left sided flank/hip pain for about 1 mth now that is worse with 45 degree lean and better w/ laying flat.     SOB, chest pain, nausea, vomiting, endorses abd distention, no fever or chills, orthopnea, pND. LFA     Pt has LVAD in place and wife reports it went off twice today. Wife reports LVAD displayed PI of 11 and flow of 2.5 earlier today. Pt had the LVAD placed 12/1/2023 and last had it checked 2 months ago. Pt is in Mon/Wed/Fri dialysis and last had dialyzed 2 days ago. Associated sxs include HTN, decreased appetite for the last 2-3 weeks, bloody stool, and 1 episode of weakness while urinating at 2:30 today. Wife reports pt's normal blood pressure is 90/60. Wife reports pt also fell twice: first time 5 days ago, and second time earlier today. Wife reports pt hit is head during his fall today. Symptoms are constant and moderate in severity. No mitigating or exacerbating factors reported. Wife denies any fever, nausea, or emesis from pt. Wife reports pt is on fluid restriction diet and has been doing well with. Prior Tx includes 1 dose of Hydroxyzine 25 mg as instructed by LVAD team over the phone.  No further complaints or concerns at this time.     Home Medications:   amiodarone HCl 400 mg Oral Daily  atorvastatin calcium 40 mg Oral Nightl  gabapentin 300 mg Nightly  pantoprazole sodium 40 mg Oral Daily  trazodone HCl 25 mg Oral Nightly  venlafaxine HCl 37.5 mg  Oral  warfarin sodium 2.5 MG daily except MWF      Cardiac Imaging:  Echo (12/19/2024):     Left Ventricle: The left ventricle is normal in size. Ventricular mass is normal. Normal wall thickness. There is severely reduced systolic function with a visually estimated ejection fraction of 10 -15%. Diastolic function cannot be reliably determined in the presence of mitral valve repair and presence of LVAD. Limtited view od LV apex due to near field artifcat. No obvious thrombus seen.    Right Ventricle: Normal right ventricular cavity size. Wall thickness is normal. Right ventricle wall motion  is normal. Systolic function is moderately reduced.    Left Atrium: Left atrium is moderately dilated.    Right Atrium: Right atrium is mildly dilated. Lead present in the right atrium.    Mitral Valve: The mitral valve is repaired with an Noble stitch.    Aorta: Aortic root is normal in size measuring 2.77 cm. Ascending aorta is normal measuring 3.26 cm.    Pulmonary Artery: The estimated pulmonary artery systolic pressure is 33 mmHg.    IVC/SVC: Intermediate venous pressure at 8 mmHg.    Pericardium: There is no pericardial effusion.    RHC (1/22/2024):    RHC performed via the right IJ. Patient tolerated the procedure well. Elevated left and right side filling pressures (RA= 13 mm of Hg, PCWP=22 mm of Hg). Mild to moderate pulmonary HTN  (PA= 50/25 mm of Hg, PA mean=35 mm of Hg) in the setting of elevated left sided filling pressures. Normal high cardiac index and output  (CI=2.98 L/min/m2, CO=5.8 L/min) with HM3 support at LVAD speed of 4900 rpm.    Recommend gentle IV diuresis/Echo to reassess LV /RV size and speed adjustment.           Left flank pain  - Appears to MSK related.  - CT pelvis unremarkable.   - Resolved    Falls  Fall 2/2 to left flank pain./back See plan above.     - patient will go for outpatient PT    Acute on chronic combined systolic and diastolic CHF, NYHA class 4  Secondary to ESRD. Expect to improve  with scheduled dialysis. Nephrology consulted to resume dialysis.     Positive occult stool blood test  - most likely secondary to known hemorrhoids.   - Hgb stable and bleeding resolved.   - Will continue to monitor for now.     LVAD (left ventricular assist device) present  Procedure: Device Interrogation Including analysis of device parameters  Current Settings: Ventricular Assist Device  Review of device function is stable/unstable stable  Anticoagulation w/ warfarin. Goal INR 1-2.           3/12/2025     4:30 AM 3/12/2025    12:12 AM 3/11/2025     8:23 PM 3/11/2025     4:00 PM 3/11/2025    11:53 AM 3/11/2025     8:00 AM 3/11/2025    12:40 AM   TXP LVAD INTERROGATIONS   Type HeartMate3 HeartMate3 HeartMate3 HeartMate3 HeartMate3 HeartMate3 HeartMate3   Flow 3.8 3.4 3.2 3.6 3.3 3.3 3.6   Speed 5000 5000 5000 5000 5000 5000 5050   PI 4.5 7.4 5.2 6.4 6.3 7.5 6.1   Power ([place]) 3.3 3.4 3.4 3.4 3 3.4 3.4   LSL 4600 4600 4600 4600 4600 4600 4600   Pulsatility Intermittent pulse Intermittent pulse Intermittent pulse Intermittent pulse Intermittent pulse Pulse Intermittent pulse             Angela Shen MD  Heart Transplant  [facility] - Cardiac Intensive Care

## 2025-03-12 NOTE — ASSESSMENT & PLAN NOTE
Procedure: Device Interrogation Including analysis of device parameters  Current Settings: Ventricular Assist Device  Review of device function is stable/unstable stable  Anticoagulation w/ warfarin. Goal INR 1-2.           3/12/2025     4:30 AM 3/12/2025    12:12 AM 3/11/2025     8:23 PM 3/11/2025     4:00 PM 3/11/2025    11:53 AM 3/11/2025     8:00 AM 3/11/2025    12:40 AM   TXP LVAD INTERROGATIONS   Type HeartMate3 HeartMate3 HeartMate3 HeartMate3 HeartMate3 HeartMate3 HeartMate3   Flow 3.8 3.4 3.2 3.6 3.3 3.3 3.6   Speed 5000 5000 5000 5000 5000 5000 5050   PI 4.5 7.4 5.2 6.4 6.3 7.5 6.1   Power ([place]) 3.3 3.4 3.4 3.4 3 3.4 3.4   LSL 4600 4600 4600 4600 4600 4600 4600   Pulsatility Intermittent pulse Intermittent pulse Intermittent pulse Intermittent pulse Intermittent pulse Pulse Intermittent pulse

## 2025-03-12 NOTE — PROGRESS NOTES
Bedside HD initiated, /. Uf net goal set for 1 liter as tolerated. B/P 92/Doppler. Pulse 70, o2 sat on room air 100%. Patient alert

## 2025-03-12 NOTE — HOSPITAL COURSE
Upon admission, patient had CT scan done of pelvis that was unremarkable. Left flank/back pain self resolved. Physical therapy was consulted and patient was cleared for outpatient PT. At the time of discharge patient was clinically and hemodynamically stable. Patient agreed w/ plan for discharge.

## 2025-03-12 NOTE — PROGRESS NOTES
Bedside HD completed, post B/P 92/0 doppler. Pulse 77, o2 sat on room air 99%. Net uf removed .5 liter. Patient awake and alert . Patient voiced no complaints at this time.

## 2025-03-12 NOTE — PLAN OF CARE
PT/OT walked with patient in [address] during the day with a walker. VSS. LVAD number and doppler WNL. LVAD dressing CDI. Spouse at bedside. Pt educated on fall risk and remained free from falls/trauma/injury. Denies chest pain, SOB, palpitations, dizziness, pain, or discomfort. Plan of care reviewed with pt, all questions answered. Bed locked in lowest position, call bell within reach, no acute distress noted, will continue to monitor.

## 2025-03-13 ENCOUNTER — ANTI-COAG VISIT (OUTPATIENT)
Dept: CARDIOLOGY | Facility: CLINIC | Age: 65
End: 2025-03-13
Payer: MEDICAID

## 2025-03-13 DIAGNOSIS — Z95.811 LVAD (LEFT VENTRICULAR ASSIST DEVICE) PRESENT: Primary | ICD-10-CM

## 2025-03-13 NOTE — PROGRESS NOTES
Admitted 3/10-3/13. Hospital course: Radha Abbott is a 64 y.o. male s/p HM3 lvad from 12.1.2023 admitted for evaluation of a fall. During his hospital stay Mr. Abbott was evaluated for left flank pain. He underwent abdominal CT. He was started on amitriptyline as an appetite stimulant. His inr today is 2.7. Plan to continue previous home dose of warfarin 1.25mg on MWF and 2.5mg on other days.

## 2025-03-13 NOTE — PROGRESS NOTES
Pt wife confirmed correct dosing per calendar. INR rescheduled for 3/17/25, pt wife verbalized understanding.

## 2025-03-13 NOTE — DISCHARGE SUMMARY
[facility] - Cardiac Intensive Care  Heart Transplant  Discharge Summary      Patient Name: Radha Abbott  MRN: [id number]  Admission Date: 3/10/2025  Hospital Length of Stay: 2 days  Discharge Date and Time: 03/13/2025 6:03 AM  Attending Physician: No att. providers found   Discharging Provider: Angela Shen MD  Primary Care Provider: Vasu Kong MD     HPI: Mr. Radha Abbott is a 65 y/o AAM w/ PMHx of stage D HFrEF, ICM s/p HM3 as DT (12/1/2023), ESRD on HD (MWF), and debility who presented to ED in [place] w/ complaints of  falls x2. States when he leans over at 45 degree angle he is not able to lift himself back up leading to his falls. States he has been having left sided flank/hip pain for about 1 mth now that is worse with 45 degree lean and better w/ laying flat.     SOB, chest pain, nausea, vomiting, endorses abd distention, no fever or chills, orthopnea, pND. LFA     Pt has LVAD in place and wife reports it went off twice today. Wife reports LVAD displayed PI of 11 and flow of 2.5 earlier today. Pt had the LVAD placed 12/1/2023 and last had it checked 2 months ago. Pt is in Mon/Wed/Fri dialysis and last had dialyzed 2 days ago. Associated sxs include HTN, decreased appetite for the last 2-3 weeks, bloody stool, and 1 episode of weakness while urinating at 2:30 today. Wife reports pt's normal blood pressure is 90/60. Wife reports pt also fell twice: first time 5 days ago, and second time earlier today. Wife reports pt hit is head during his fall today. Symptoms are constant and moderate in severity. No mitigating or exacerbating factors reported. Wife denies any fever, nausea, or emesis from pt. Wife reports pt is on fluid restriction diet and has been doing well with. Prior Tx includes 1 dose of Hydroxyzine 25 mg as instructed by LVAD team over the phone.  No further complaints or concerns at this time.     Home Medications:   amiodarone HCl 400 mg Oral Daily  atorvastatin calcium 40 mg Oral  Nightl  gabapentin 300 mg Nightly  pantoprazole sodium 40 mg Oral Daily  trazodone HCl 25 mg Oral Nightly  venlafaxine HCl 37.5 mg Oral  warfarin sodium 2.5 MG daily except MWF      Cardiac Imaging:  Echo (12/19/2024):     Left Ventricle: The left ventricle is normal in size. Ventricular mass is normal. Normal wall thickness. There is severely reduced systolic function with a visually estimated ejection fraction of 10 -15%. Diastolic function cannot be reliably determined in the presence of mitral valve repair and presence of LVAD. Limtited view od LV apex due to near field artifcat. No obvious thrombus seen.    Right Ventricle: Normal right ventricular cavity size. Wall thickness is normal. Right ventricle wall motion  is normal. Systolic function is moderately reduced.    Left Atrium: Left atrium is moderately dilated.    Right Atrium: Right atrium is mildly dilated. Lead present in the right atrium.    Mitral Valve: The mitral valve is repaired with an Noble stitch.    Aorta: Aortic root is normal in size measuring 2.77 cm. Ascending aorta is normal measuring 3.26 cm.    Pulmonary Artery: The estimated pulmonary artery systolic pressure is 33 mmHg.    IVC/SVC: Intermediate venous pressure at 8 mmHg.    Pericardium: There is no pericardial effusion.    RHC (1/22/2024):    RHC performed via the right IJ. Patient tolerated the procedure well. Elevated left and right side filling pressures (RA= 13 mm of Hg, PCWP=22 mm of Hg). Mild to moderate pulmonary HTN  (PA= 50/25 mm of Hg, PA mean=35 mm of Hg) in the setting of elevated left sided filling pressures. Normal high cardiac index and output  (CI=2.98 L/min/m2, CO=5.8 L/min) with HM3 support at LVAD speed of 4900 rpm.    Recommend gentle IV diuresis/Echo to reassess LV /RV size and speed adjustment.           * No surgery found *     Hospital Course: Upon admission, patient had CT scan done of pelvis that was unremarkable. Left flank/back pain self resolved.  Physical therapy was consulted and patient was cleared for outpatient PT. At the time of discharge patient was clinically and hemodynamically stable. Patient agreed w/ plan for discharge.     Goals of Care Treatment Preferences:  Code Status: Full Code    Health care agent: Pt's wifeArlyn is HEATHER  Health care agent number: No value filed.                   Consults (From admission, onward)          Status Ordering Provider     Inpatient consult to Nephrology  Once        Provider:  (Not yet assigned)    Completed NED ANGELA     Inpatient consult to Registered Dietitian/Nutritionist  Once        Provider:  (Not yet assigned)    Completed NED ANGELA     Inpatient consult to Endocrinology  Once        Provider:  (Not yet assigned)    Completed NED ANGELA            Significant Diagnostic Studies: Labs: BMP:   Recent Labs   Lab 03/11/25  0639 03/12/25  0651   GLU 87  --    *  --    K 4.5  --      --    CO2 20*  --    BUN 36*  --    CREATININE 6.2*  --    CALCIUM 8.3*  --    MG 1.8 2.0       Pending Diagnostic Studies:       None          Final Active Diagnoses:    Diagnosis Date Noted POA    PRINCIPAL PROBLEM:  Falls [R29.6] 03/10/2025 Not Applicable    Left flank pain [R10.9] 03/10/2025 Yes    Acute on chronic combined systolic and diastolic CHF, NYHA class 4 [I50.43] 10/17/2023 Yes    Positive occult stool blood test [R19.5] 03/10/2025 Unknown    Acute combined systolic and diastolic congestive heart failure [I50.41] 04/24/2024 Yes    ESRD (end stage renal disease) [N18.6] 01/22/2024 Yes    LVAD (left ventricular assist device) present [Z95.811] 12/01/2023 Not Applicable    Type 2 diabetes mellitus without complication, without long-term current use of insulin [E11.9] 10/07/2023 Yes      Problems Resolved During this Admission:      Discharged Condition: good    Disposition: Home or Self Care    Follow Up:    Patient Instructions:      Ambulatory Referral/Consult to  Physical/Occupational Therapy   Standing Status: Future   Referral Priority: Routine Referral Type: Physical Therapy   Referral Reason: Specialty Services Required   Requested Specialty: Physical Therapy   Number of Visits Requested: 1     Ambulatory Referral/Consult to Physical/Occupational Therapy   Standing Status: Future   Referral Priority: Routine Referral Type: Physical Therapy   Referral Reason: Specialty Services Required   Requested Specialty: Occupational Therapy   Number of Visits Requested: 1     Medications:  Reconciled Home Medications:      Medication List        START taking these medications      amitriptyline 25 MG tablet  Commonly known as: ELAVIL  Take 1 tablet (25 mg total) by mouth 3 (three) times a week.            CHANGE how you take these medications      warfarin 2.5 MG tablet  Commonly known as: COUMADIN  Take 1.25 mg orally daily on Monday, Wednesday and Fridays. Take 2.5mg daily orally on all other days.  What changed: See the new instructions.            CONTINUE taking these medications      amiodarone 400 MG tablet  Commonly known as: PACERONE  Take 1 tablet (400 mg total) by mouth once daily.     atorvastatin 40 MG tablet  Commonly known as: LIPITOR  Take 1 tablet (40 mg total) by mouth every evening.     Fish OiL 1,000 (120-180) mg Cap  Generic drug: omega 3-dha-epa-fish oil  Take 1 capsule by mouth once daily.     pregabalin 100 MG capsule  Commonly known as: LYRICA  Take 1 capsule by mouth every other day.     pulse oximeter device  Commonly known as: pulse oximeter  by Apply Externally route 2 (two) times a day. Use twice daily at 8 AM and 3 PM and [facility].     sevelamer carbonate 800 mg Tab  Commonly known as: RENVELA  Take 800 mg by mouth 3 (three) times daily with meals.     traZODone 50 MG tablet  Commonly known as: DESYREL  Take 0.5 tablets (25 mg total) by mouth every evening.     venlafaxine 37.5 MG Tab  Commonly known as: EFFEXOR  Take 1  tablet by mouth once daily     vitamin D 1000 units Tab  Commonly known as: VITAMIN D3  Take 1 tablet by mouth once daily            STOP taking these medications      loratadine 10 mg tablet  Commonly known as: SAMANTHA Shen MD  Heart Transplant  [facility] - Cardiac Int[facility] Care

## 2025-03-13 NOTE — PROGRESS NOTES
Discharge    Patient's wife arrived to take patient home for discharge. Patient's IV/ Tele removed by RN. Patient placed on their home battery power. AVS printed and signed and with patient along with all other belongings, including VAD emergency kit. Patient's new prescriptions have been sent electronically to their preferred home pharmacy. RN transported patient alongside wife via wheelchair to ED entrance and patient ambulated from wheelchair to car with no issues.

## 2025-03-14 NOTE — PROGRESS NOTES
Dc Follow-up     Faxed ([phone]) Dc Summary to [facility] at [facility] ([phone]). Later, f/u with [facility] to confirm receipt of fax, which [facility] confirmed that fax was received.

## 2025-03-17 ENCOUNTER — TELEPHONE (OUTPATIENT)
Dept: TRANSPLANT | Facility: CLINIC | Age: 65
End: 2025-03-17
Payer: MEDICAID

## 2025-03-17 ENCOUNTER — ANTI-COAG VISIT (OUTPATIENT)
Dept: CARDIOLOGY | Facility: CLINIC | Age: 65
End: 2025-03-17
Payer: MEDICAID

## 2025-03-17 ENCOUNTER — LAB VISIT (OUTPATIENT)
Dept: LAB | Facility: HOSPITAL | Age: 65
End: 2025-03-17
Attending: INTERNAL MEDICINE
Payer: MEDICAID

## 2025-03-17 DIAGNOSIS — Z95.811 LVAD (LEFT VENTRICULAR ASSIST DEVICE) PRESENT: Primary | ICD-10-CM

## 2025-03-17 DIAGNOSIS — Z95.811 PRESENCE OF VENTRICULAR ASSIST DEVICE: ICD-10-CM

## 2025-03-17 LAB
INR PPP: 3.2 (ref 0.8–1.2)
PROTHROMBIN TIME: 34.1 SEC (ref 9–12.5)

## 2025-03-17 PROCEDURE — 93793 ANTICOAG MGMT PT WARFARIN: CPT | Mod: ,,,

## 2025-03-17 PROCEDURE — 85610 PROTHROMBIN TIME: CPT | Performed by: INTERNAL MEDICINE

## 2025-03-17 PROCEDURE — 36415 COLL VENOUS BLD VENIPUNCTURE: CPT | Performed by: INTERNAL MEDICINE

## 2025-03-17 NOTE — PROGRESS NOTES
Arlyn reports correct Warfarin dose, started Amitriptyline 3/12/25, no other changes reported.

## 2025-03-17 NOTE — PROGRESS NOTES
[facility]    03/17/2025 1:31 PM    Assessment/Plan:    Patient presents today with supratherapeutic INR.    Assessment of patient findings and chart review: no significant findings     Recommendation for patient's warfarin regimen: Hold dose today then resume current maintenance dose    Recommend repeat INR Thursday  _________________________________________________________________    Radha Abbott (64 y.o.) is followed by the [facility].    Anticoagulation Summary  As of 3/17/2025      INR goal:  2.0-3.0   TTR:  71.4% (10.5 mo)   INR used for dosing:  3.2 (3/17/2025)   Warfarin maintenance plan:  1.25 mg (2.5 mg x 0.5) every Mon, Wed, Fri; 2.5 mg (2.5 mg x 1) all other days   Weekly warfarin total:  13.75 mg   Plan last modified:  Jesenia Jensen, PharmD (1/6/2025)   Next INR check:  3/20/2025   Target end date:  --    Indications    LVAD (left ventricular assist device) present [Z95.811]                 Anticoagulation Episode Summary       INR check location:  --    Preferred lab:  --    Send INR reminders to:  [facility] COUMADIN LVAD    Comments:  [place]          Anticoagulation Care Providers       Provider Role Specialty Phone number    Sajan Hurley MD [facility] Cardiology [phone]

## 2025-03-17 NOTE — TELEPHONE ENCOUNTER
Paged by Tete, HD nurse. Patient had 4 second low  flow alarm during HD. Doppler 62/0, asymptomatic. UF being held. BP remains low. Will continue to hold UF at this time. Will discuss with MD as patient has now had several episodes of low flow alarms during HD sessions.

## 2025-03-18 ENCOUNTER — TELEPHONE (OUTPATIENT)
Dept: TRANSPLANT | Facility: CLINIC | Age: 65
End: 2025-03-18
Payer: MEDICAID

## 2025-03-18 NOTE — TELEPHONE ENCOUNTER
VAD Contact: Called Spouse regarding equipment maintenance due at upcoming clinic appointment on 3/20.  Requested Spouse to bring Mobile Power Unit (MPU) and Emergency Bag (2 batteries, 2 clips, 1 backup controller) with them to next appointment for maintenance.  Spouse verbalized understanding and agreement.    It is medically necessary to ensure patient has properly functioning equipment and wearables to prevent infection, injury or death to patient.

## 2025-03-20 ENCOUNTER — CLINICAL SUPPORT (OUTPATIENT)
Dept: TRANSPLANT | Facility: CLINIC | Age: 65
End: 2025-03-20
Payer: MEDICAID

## 2025-03-20 ENCOUNTER — HOSPITAL ENCOUNTER (OUTPATIENT)
Dept: PULMONOLOGY | Facility: CLINIC | Age: 65
Discharge: HOME | End: 2025-03-20
Payer: MEDICAID

## 2025-03-20 ENCOUNTER — OFFICE VISIT (OUTPATIENT)
Dept: TRANSPLANT | Facility: CLINIC | Age: 65
End: 2025-03-20
Attending: INTERNAL MEDICINE
Payer: MEDICAID

## 2025-03-20 ENCOUNTER — ANTI-COAG VISIT (OUTPATIENT)
Dept: CARDIOLOGY | Facility: CLINIC | Age: 65
End: 2025-03-20
Payer: MEDICAID

## 2025-03-20 VITALS
BODY MASS INDEX: 24.11 KG/M2 | TEMPERATURE: 99 F | WEIGHT: 178 LBS | HEART RATE: 77 BPM | HEIGHT: 72 IN | SYSTOLIC BLOOD PRESSURE: 82 MMHG

## 2025-03-20 DIAGNOSIS — Z79.01 ANTICOAGULANT LONG-TERM USE: ICD-10-CM

## 2025-03-20 DIAGNOSIS — I50.42 CHRONIC COMBINED SYSTOLIC AND DIASTOLIC CHF, NYHA CLASS 4: Primary | Chronic | ICD-10-CM

## 2025-03-20 DIAGNOSIS — E11.9 TYPE 2 DIABETES MELLITUS WITHOUT COMPLICATION, WITHOUT LONG-TERM CURRENT USE OF INSULIN: ICD-10-CM

## 2025-03-20 DIAGNOSIS — Z95.811 LVAD (LEFT VENTRICULAR ASSIST DEVICE) PRESENT: ICD-10-CM

## 2025-03-20 DIAGNOSIS — Z95.811 PRESENCE OF VENTRICULAR ASSIST DEVICE: ICD-10-CM

## 2025-03-20 DIAGNOSIS — I50.43 ACUTE ON CHRONIC COMBINED SYSTOLIC AND DIASTOLIC CHF, NYHA CLASS 4: ICD-10-CM

## 2025-03-20 DIAGNOSIS — Z95.811 LVAD (LEFT VENTRICULAR ASSIST DEVICE) PRESENT: Primary | ICD-10-CM

## 2025-03-20 DIAGNOSIS — Z91.89 AT RISK FOR AMIODARONE TOXICITY WITH LONG TERM USE: ICD-10-CM

## 2025-03-20 DIAGNOSIS — Z99.2 DIALYSIS PATIENT: ICD-10-CM

## 2025-03-20 DIAGNOSIS — N18.6 ESRD (END STAGE RENAL DISEASE): ICD-10-CM

## 2025-03-20 DIAGNOSIS — Z95.811 HEART REPLACED BY HEART ASSIST DEVICE: Primary | ICD-10-CM

## 2025-03-20 DIAGNOSIS — I50.9 HEART FAILURE, UNSPECIFIED HF CHRONICITY, UNSPECIFIED HEART FAILURE TYPE: ICD-10-CM

## 2025-03-20 DIAGNOSIS — I25.10 CORONARY ARTERY DISEASE INVOLVING NATIVE CORONARY ARTERY OF NATIVE HEART WITHOUT ANGINA PECTORIS: ICD-10-CM

## 2025-03-20 DIAGNOSIS — I10 HYPERTENSION, UNSPECIFIED TYPE: ICD-10-CM

## 2025-03-20 DIAGNOSIS — Z79.899 AT RISK FOR AMIODARONE TOXICITY WITH LONG TERM USE: ICD-10-CM

## 2025-03-20 PROCEDURE — 94726 PLETHYSMOGRAPHY LUNG VOLUMES: CPT | Mod: PBBFAC | Performed by: INTERNAL MEDICINE

## 2025-03-20 PROCEDURE — 94729 DIFFUSING CAPACITY: CPT | Mod: PBBFAC | Performed by: INTERNAL MEDICINE

## 2025-03-20 PROCEDURE — 94010 BREATHING CAPACITY TEST: CPT | Mod: PBBFAC | Performed by: INTERNAL MEDICINE

## 2025-03-20 PROCEDURE — 99999 PR PBB SHADOW E&M-EST. PATIENT-LVL III: CPT | Mod: PBBFAC,,, | Performed by: INTERNAL MEDICINE

## 2025-03-20 PROCEDURE — 99213 OFFICE O/P EST LOW 20 MIN: CPT | Mod: PBBFAC | Performed by: INTERNAL MEDICINE

## 2025-03-20 RX ORDER — VENLAFAXINE 37.5 MG/1
37.5 TABLET ORAL DAILY
Qty: 90 TABLET | Refills: 3 | Status: SHIPPED | OUTPATIENT
Start: 2025-03-20

## 2025-03-20 RX ORDER — MEGESTROL ACETATE 40 MG/1
40-80 TABLET ORAL DAILY
COMMUNITY

## 2025-03-20 NOTE — PROCEDURES
3/20/2025     1:38 PM 3/12/2025     4:30 AM 3/12/2025    12:12 AM 3/11/2025     8:23 PM 3/11/2025     4:00 PM 3/11/2025    11:53 AM 3/11/2025     8:00 AM   TXP PINA INTERROGATIONS   Type HeartMate3         Flow 3.6         Speed 5000         PI 5.5         Power ([place]) 3.5         LSL 4600         Pulsatility No Pulse Intermittent pulse Intermittent pulse Intermittent pulse Intermittent pulse Intermittent pulse Pulse   }

## 2025-03-20 NOTE — PROGRESS NOTES
Subjective:   Patient ID:  Radha Abbott is a 64 y.o. male who presents for LVAD followup visit.    Implant date: 12/1/23         3/12/2025     4:30 AM 3/12/2025    12:12 AM 3/11/2025     8:23 PM 3/11/2025     4:00 PM 3/11/2025    11:53 AM 3/11/2025     8:00 AM 3/11/2025    12:40 AM   TXP PINA INTERROGATIONS   Pulsatility Intermittent pulse Intermittent pulse Intermittent pulse Intermittent pulse Intermittent pulse Pulse Intermittent pulse       63 YO M w/ stage D HFrEF, ICMP who underwent HM3 placement placed by Dr Washington on 12/1/23. Lengthy post op course complicated by vasoplegia(requiring Giaprezza), debility, malnutrition/impaired swallowing, and renal failure requiring HD (weaned off). Once medically stable, he was transferred to Rehab for further PT/OT/ST. He had done well for a while but then had several clinical events including; worsening renal failure and  COVID/pneumonia. Was discharged and readmitted again for C diff for which he was successfully treated. He is on HD now.    Comes in today for clinic visit. He was in the hospital earlier this month for falls. PT evaluated him and recommended outpatient PT. Comes in for FU. Doing well. No CV complaints. Prescribed megace by PCP for appetite.    ROS    Objective:   Blood pressure (!) 82/0, pulse 77, temperature 98.5 °F (36.9 °C), temperature source Oral, height 6' (1.829 m), weight 80.7 kg (178 lb).body mass index is 24.14 kg/m².    Doppler: 82    Physical Exam  Vitals reviewed.   Constitutional:       General: He is not in acute distress.  HENT:      Head: Atraumatic.   Eyes:      Extraocular Movements: Extraocular movements intact.   Cardiovascular:      Comments: LVAD hum present.  Pulmonary:      Breath sounds: Normal breath sounds.   Abdominal:      Palpations: Abdomen is soft.      Tenderness: There is no abdominal tenderness.   Musculoskeletal:         General: Normal range of motion.      Right lower leg: No edema.      Left lower leg: No edema.    Neurological:      General: No focal deficit present.      Mental Status: He is alert and oriented to person, place, and time.         Lab Results   Component Value Date    WBC 7.27 03/20/2025    HGB 10.6 (L) 03/20/2025    HCT 36.9 (L) 03/20/2025    MCV 88 03/20/2025     03/20/2025    CO2 26 03/20/2025    CREATININE 5.9 (H) 03/20/2025    CALCIUM 8.6 (L) 03/20/2025    ALBUMIN 2.5 (L) 03/20/2025    AST 34 03/20/2025     (H) 03/20/2025    ALT 53 (H) 03/20/2025     (H) 03/12/2025       Lab Results   Component Value Date    INR 3.0 (H) 03/20/2025    INR 3.2 (H) 03/17/2025    INR 2.7 (H) 03/12/2025       BNP   Date Value Ref Range Status   03/20/2025 297 (H) 0 - 99 pg/mL Final     Comment:     Values of less than 100 pg/ml are consistent with non-CHF populations.   03/12/2025 145 (H) 0 - 99 pg/mL Final     Comment:     Values of less than 100 pg/ml are consistent with non-CHF populations.   03/10/2025 1,231 (H) 0 - 99 pg/mL Final     Comment:     Values of less than 100 pg/ml are consistent with non-CHF populations.       LD   Date Value Ref Range Status   03/12/2025 373 (H) 110 - 260 U/L Final     Comment:     Results are increased in hemolyzed samples.   03/11/2025 570 (H) 110 - 260 U/L Final     Comment:     Results are increased in hemolyzed samples.  *Result may be interfered by visible hemolysis     01/30/2025 330 (H) 110 - 260 U/L Final     Comment:     Results are increased in hemolyzed samples.       Labs were reviewed with the patient.    No results found for this or any previous visit.    No results found for this or any previous visit.      Assessment:      1. Chronic combined systolic and diastolic CHF, NYHA class 4    2. Presence of ventricular assist device    3. LVAD (left ventricular assist device) present [Z95.811]    4. Coronary artery disease involving native coronary artery of native heart without angina pectoris    5. Acute on chronic combined systolic and diastolic CHF, NYHA  class 4    6. Heart failure, unspecified HF chronicity, unspecified heart failure type    7. Hypertension, unspecified type    8. ESRD (end stage renal disease)    9. Dialysis patient    10. Anticoagulant long-term use    11. Type 2 diabetes mellitus without complication, without long-term current use of insulin        Plan:     Doing well. Euvolemic. Working on PT as outpatient. Doppler BP 82.    GDMT: Intolerant due to low BP and ESRD    Additionally, the patient has a patient centered goal of being able to improve their quality of life      Patient is now NYHA II  Recommend 2 gram sodium restriction and 1500cc fluid restriction.  Encourage physical activity with graded exercise program.  Requested patient to weigh themselves daily, and to notify us if their weight increases by more than 3 lbs in 1 day or 5 lbs in 1 week.     Patient advised that it is recommended that all patients, and their close contacts and household members receive Covid vaccination.    Listed for transplant: No    UNOS Patient Status  Functional Status: 90% - Able to carry on normal activity: minor symptoms of disease  Physical Capacity: Limited Mobility  Working for Income: Unknown    Advance Care Planning     Date: 03/20/2025    Power of   I initiated the process of voluntary advance care planning today and explained the importance of this process to the patient.  I introduced the concept of advance directives to the patient, as well. Then the patient received detailed information about the importance of designating a Health Care Power of  (HCPOA). He was also instructed to communicate with this person about their wishes for future healthcare, should he become sick and lose decision-making capacity. The patient has previously appointed a HCPOA.        Walter Mcintyre MD

## 2025-03-20 NOTE — LETTER
March 20, 2025        Kevin Franklin  [address] [place] [address]  Phone: [phone]  Fax: [phone]             [facility]  [address]  [facility] [address]  Phone: [phone]   Patient: Radha Abbott   MR Number: [id number]   YOB: 1960   Date of Visit: 3/20/2025       Dear Dr. Kevin Franklin    Thank you for referring Radha Abbott to me for evaluation. Attached you will find relevant portions of my assessment and plan of care.    If you have questions, please do not hesitate to call me. I look forward to following Radha Abbott along with you.    Sincerely,    Walter Mcintyre MD    Enclosure    If you would like to receive this communication electronically, please contact [email or web address] or [phone] to request [employer] access.    [employer] is a tool which provides read-only access to select patient information with whom you have a relationship. Its easy to use and provides real time access to review your patients record including encounter summaries, notes, results, and demographic information.    If you feel you have received this communication in error or would no longer like to receive these types of communications, please e-mail [email or web address]

## 2025-03-20 NOTE — PROGRESS NOTES
Date of Implant with Heartmate 3 LVAD: 12/1/23    PATIENT ARRIVED IN CLINIC:  Ambulatory with rollater  Accompanied by: wife  Complaints/reason for visit today: routine    Vitals  Temperature, oral:   Temp Readings from Last 1 Encounters:   03/20/25 98.5 °F (36.9 °C) (Oral)     Blood Pressure:   BP Readings from Last 3 Encounters:   03/20/25 (!) 82/0   03/12/25 (!) 86/0   03/10/25 104/83        VAD Interrogation:      3/12/2025     4:30 AM 3/12/2025    12:12 AM 3/11/2025     8:23 PM   TXP PINA INTERROGATIONS   Pulsatility Intermittent pulse Intermittent pulse Intermittent pulse     HCT:   Lab Results   Component Value Date    HCT 36.9 (L) 03/20/2025    HCT 39 07/23/2024       VAD Assessment  Problems / Issues /Equipment Needs/ Alarms with VAD if any: None noted  VAD Sounds: HM3 Smooth  VAD Binder With Patient: No  Reviewed VAD Numbers In Binder: No  Emergency Equipment With Patient: Yes   Equipment Needs: No   It is medically necessary to ensure patient has properly functioning equipment and wearables to prevent infection, injury or death to patient.     DLES Assessment and Dressing Care:  Appearance of DLES: 1  Antibiotics: NO  Velour: No  Manual & Visual Inspection Of Driveline: No kinks or tears noted  Stabilization Device In Use: yes, goodman securement device    Heartmate 3 Module Cable:  No yellow exposed and Attempted to unscrew modular cable to ensure it will be able to come lose in the event we ever need to change the modular cable while patient held the driveline in place so it would not move. Modular cable connection able to be unscrewed and re-tightened. Instructed pt to perform this weekly.  Frequency of Dressing Changes: weekly & silverlon kit   Pt In Need Of Management Kits?:no   It is medically necessary to have VAD management kits in order to prevent infection or to assist in the healing of an infected DLES.    Patient MyChart Questionnaire:        No data to display                 Assessment/ Quality  of Life Survery:   Complaints Of Nausea / Vomiting: None noted    Appearance and Frequency Of Stools: normal and formed without blood & daily  Color Of Urine: clear/yellow; urinating a very small amount  Pain: chronic lower back pain  Coping/Depression/Anxiety: coping okay  Sleep Habits: 8-9 hrs /night  Sleep Aids: None noted  Showering: Yes, reminded to change dressing immediately after drying off  Self- Care I have some problems washing or dressing myself  Mobility I have some problems in walking about  Usual Activities I have some problems with performing my usual activities  Activity/Exercise: walking but starting outpatient PT next week: Tu/Th for chronic lower back pain   Driving: No.  Additional Comments: Is it OK to take Megace?    Labs:    Chemistry        Component Value Date/Time     03/20/2025 1022    K 4.4 03/20/2025 1022    CL 99 03/20/2025 1022    CO2 26 03/20/2025 1022    BUN 26 (H) 03/20/2025 1022    CREATININE 5.9 (H) 03/20/2025 1022     (H) 03/20/2025 1022        Component Value Date/Time    CALCIUM 8.6 (L) 03/20/2025 1022    ALKPHOS 144 03/20/2025 1022    AST 34 03/20/2025 1022    ALT 53 (H) 03/20/2025 1022    BILITOT 0.5 03/20/2025 1022    ESTGFRAFRICA >60.0 02/07/2018 0935    EGFRNONAA >60.0 02/07/2018 0935            Magnesium   Date Value Ref Range Status   03/20/2025 1.9 1.6 - 2.6 mg/dL Final       Lab Results   Component Value Date    WBC 7.27 03/20/2025    HGB 10.6 (L) 03/20/2025    HCT 36.9 (L) 03/20/2025    MCV 88 03/20/2025     03/20/2025       Lab Results   Component Value Date    INR 3.0 (H) 03/20/2025    INR 3.2 (H) 03/17/2025    INR 2.7 (H) 03/12/2025       BNP   Date Value Ref Range Status   03/20/2025 297 (H) 0 - 99 pg/mL Final     Comment:     Values of less than 100 pg/ml are consistent with non-CHF populations.   03/12/2025 145 (H) 0 - 99 pg/mL Final     Comment:     Values of less than 100 pg/ml are consistent with non-CHF populations.   03/10/2025 1231  (H) 0 - 99 pg/mL Final     Comment:     Values of less than 100 pg/ml are consistent with non-CHF populations.       LD   Date Value Ref Range Status   03/12/2025 373 (H) 110 - 260 U/L Final     Comment:     Results are increased in hemolyzed samples.   03/11/2025 570 (H) 110 - 260 U/L Final     Comment:     Results are increased in hemolyzed samples.  *Result may be interfered by visible hemolysis     01/30/2025 330 (H) 110 - 260 U/L Final     Comment:     Results are increased in hemolyzed samples.       Labs reviewed with patient: YES     Medication Reconciliation: per MA.  New Medication Detail Provided: no  Coumadin Managed by: [facility]    Education: Reviewed driveline care, emergency procedures, how to change the controller, alarms with patient, as well as discussed how to page the VAD coordinator in case of an emergency.   Educated patient/family that chest compressions are allowed in the event they are needed.    Reminded patient/caregiver not to touch their face and to cover their mouth when they cough or sneeze.   Vaccines: Pt informed that we are encouraging all VAD patients to receive  vaccines and boosters. Informed pt that they can take tylenol but should avoid other NSAIDs.       Plans/Needs: Routine f/u. See above regarding outpatient PT starting next week. He is also going to start taking Megace for appetite, prescribed by PCP. Reminded wife and patient that if/when he starts to gain weight, to tell HD as they base his amount pulled during HD off of his dry weight.     RTC in 3M with FLP, TSH/T4. May can go to 6M after.     Hurricane Season: No

## 2025-03-20 NOTE — PROGRESS NOTES
[facility]    03/20/2025 11:38 AM    Assessment/Plan:    Patient presents today with therapeutic INR.    Assessment of patient findings and chart review: no significant findings     Recommendation for patient's warfarin regimen: Continue current maintenance dose    Recommend repeat INR Monday  _________________________________________________________________    Emmanuelleaimee Abbott (64 y.o.) is followed by the [facility].    Anticoagulation Summary  As of 3/20/2025      INR goal:  2.0-3.0   TTR:  70.8% (10.9 mo)   INR used for dosing:  3.0 (3/20/2025)   Warfarin maintenance plan:  1.25 mg (2.5 mg x 0.5) every Mon, Wed, Fri; 2.5 mg (2.5 mg x 1) all other days   Weekly warfarin total:  13.75 mg   Plan last modified:  Jesenia Jensen, PharmD (1/6/2025)   Next INR check:  3/24/2025   Target end date:  --    Indications    LVAD (left ventricular assist device) present [Z95.811]                 Anticoagulation Episode Summary       INR check location:  --    Preferred lab:  --    Send INR reminders to:  [facility] COUM[facility] LVAD    Comments:  Cobos          Anticoagulation Care Providers       Provider Role Specialty Phone number    Sajan Hurley MD [facility] Cardiology [phone]

## 2025-03-21 LAB
DLCO ADJ PRE: 9.25 ML/(MIN*MMHG) (ref 23.52–37.37)
DLCO SINGLE BREATH LLN: 23.52
DLCO SINGLE BREATH PRE REF: 25 %
DLCO SINGLE BREATH REF: 30.45
DLCOC SBVA LLN: 2.93
DLCOC SBVA PRE REF: 59.2 %
DLCOC SBVA REF: 4.04
DLCOC SINGLE BREATH LLN: 23.52
DLCOC SINGLE BREATH PRE REF: 30.4 %
DLCOC SINGLE BREATH REF: 30.45
DLCOCSBVAULN: 5.15
DLCOCSINGLEBREATHULN: 37.37
DLCOCSINGLEBREATHZSCORE: -5.03
DLCOSINGLEBREATHULN: 37.37
DLCOSINGLEBREATHZSCORE: -5.42
DLCOVA LLN: 2.93
DLCOVA PRE REF: 48.7 %
DLCOVA PRE: 1.97 ML/(MIN*MMHG*L) (ref 2.93–5.15)
DLCOVA REF: 4.04
DLCOVAULN: 5.15
DLVAADJ PRE: 2.39 ML/(MIN*MMHG*L) (ref 2.93–5.15)
ERV LLN: -16448.78
ERV PRE REF: 75.1 %
ERV REF: 1.22
ERVULN: ABNORMAL
FEF 25 75 LLN: 1.87
FEF 25 75 PRE REF: 69.1 %
FEF 25 75 REF: 3.58
FEV05 LLN: 1.83
FEV05 REF: 2.97
FEV1 FVC LLN: 65
FEV1 FVC PRE REF: 106.6 %
FEV1 FVC REF: 77
FEV1 LLN: 2.26
FEV1 PRE REF: 70.8 %
FEV1 REF: 3.16
FEV1FVCZSCORE: 0.77
FEV1ZSCORE: -1.68
FRCPLETH LLN: 2.76
FRCPLETH PREREF: 39.5 %
FRCPLETH REF: 3.75
FRCPLETHULN: 4.73
FVC LLN: 3.02
FVC PRE REF: 66.3 %
FVC REF: 4.1
FVCZSCORE: -2.11
IVC PRE: 2.69 L (ref 3.02–5.19)
IVC SINGLE BREATH LLN: 3.02
IVC SINGLE BREATH PRE REF: 65.7 %
IVC SINGLE BREATH REF: 4.1
IVCSINGLEBREATHULN: 5.19
LLN IC: -9999996.45
PEF LLN: 6.03
PEF PRE REF: 92.2 %
PEF REF: 8.84
PHYSICIAN COMMENT: ABNORMAL
PRE DLCO: 7.61 ML/(MIN*MMHG) (ref 23.52–37.37)
PRE ERV: 0.92 L (ref -16448.78–16451.22)
PRE FEF 25 75: 2.48 L/S (ref 1.87–5.29)
PRE FET 100: 6.39 SEC
PRE FEV05 REF: 64.2 %
PRE FEV1 FVC: 82.37 % (ref 65.37–87.78)
PRE FEV1: 2.24 L (ref 2.26–4)
PRE FEV5: 1.9 L (ref 1.83–4.1)
PRE FRC PL: 1.48 L (ref 2.76–4.73)
PRE FVC: 2.71 L (ref 3.02–5.19)
PRE IC: 1.8 L (ref -9999996.45–#######.####)
PRE PEF: 8.14 L/S (ref 6.03–11.64)
PRE REF IC: 50.6 %
PRE RV: 0.56 L (ref 1.86–3.2)
PRE TLC: 3.28 L (ref 6.38–8.68)
RAW PRE REF: 43.6 %
RAW PRE: 1.33 CMH2O*S/L (ref 3.06–3.06)
RAW REF: 3.06
REF IC: 3.55
RV LLN: 1.86
RV PRE REF: 22.3 %
RV REF: 2.53
RVTLC LLN: 29
RVTLC PRE REF: 45.2 %
RVTLC PRE: 17.23 % (ref 29.16–47.12)
RVTLC REF: 38
RVTLCULN: 47
RVULN: 3.2
SGAW PRE REF: 452.5 %
SGAW PRE: 0.38 1/(CMH2O*S) (ref 0.08–0.08)
SGAW REF: 0.08
TLC LLN: 6.38
TLC PRE REF: 43.5 %
TLC REF: 7.53
TLC ULN: 8.68
TLCZSCORE: -6.08
ULN IC: ABNORMAL
VA PRE: 3.87 L (ref 7.38–7.38)
VA SINGLE BREATH LLN: 7.38
VA SINGLE BREATH PRE REF: 52.4 %
VA SINGLE BREATH REF: 7.38
VASINGLEBREATHULN: 7.38
VC LLN: 3.02
VC PRE REF: 66.3 %
VC PRE: 2.71 L (ref 3.02–5.19)
VC REF: 4.1
VC ULN: 5.19

## 2025-03-21 NOTE — PROGRESS NOTES
Equipment:  Any Equipment Needs: Routine Maintenance    Emergency Bag  Emergency Equipment With Patient: Yes   Condition of emergency bag: NO visible damage  Contents of emergency bag: Backup controller, 2 fully charged batteries, 2 battery clips, reference cards    Maintenance Tracking  Patient has monthly checklist today: No  Patient correctly utilizing monthly checklist: No  Educated patient on utilizing monthly checklist correctly and importance of this: Yes    Equipment Inspection  Inspected patient's equipment today: Yes  Equipment clean and free of debris, including locking mechanism: Yes  Cleaned equipment today and educated patient on how to do this: Yes  Manual and visual inspection of driveline: NO   Kinks, rescue tape, tears: No  Clamshell repair: No  Perc lead repair: No    Patient wearing waist strap, vest, vielka vest, concealed carry shirt: concealed carry shirt  Condition of this: NO visible damage      Mobile Power Unit  Mobile power unit serial number:[id number]  MPU maintenance due: 9/2025  MPU maintenance completed today:  Yes  Mobile Power Unit 6 month maintenance and AA battery replacement complete. Power on test completed and passed. MPU, MPU patient cable and AC power cord inspected for damage and noted to be in good condition. Equipment cleaned.      Equipment Needs  Equipment issued to patient today in clinic: No   Discussed with MCS Coordinator and physician: Yes  Items Under Warranty No VAD Coordinator Notified Yes  Equipment loaned to patient today: No   Waveforms sent: No   It is medically necessary to ensure patient has properly functioning equipment and wearables to prevent infection, injury or death to patient.

## 2025-03-24 ENCOUNTER — LAB VISIT (OUTPATIENT)
Dept: LAB | Facility: HOSPITAL | Age: 65
End: 2025-03-24
Attending: INTERNAL MEDICINE
Payer: MEDICAID

## 2025-03-24 DIAGNOSIS — N18.4 ANEMIA IN STAGE 4 CHRONIC KIDNEY DISEASE: ICD-10-CM

## 2025-03-24 DIAGNOSIS — D63.1 ANEMIA IN STAGE 4 CHRONIC KIDNEY DISEASE: ICD-10-CM

## 2025-03-24 DIAGNOSIS — Z95.811 LVAD (LEFT VENTRICULAR ASSIST DEVICE) PRESENT: ICD-10-CM

## 2025-03-24 LAB
BILIRUB DIRECT SERPL-MCNC: 0.2 MG/DL (ref 0.1–0.3)
HCT VFR BLD AUTO: 33.7 % (ref 40–54)
HGB BLD-MCNC: 10.2 GM/DL (ref 14–18)
IRON SATN MFR SERPL: 20 % (ref 20–50)
IRON SERPL-MCNC: 47 UG/DL (ref 45–160)
TIBC SERPL-MCNC: 231 UG/DL (ref 250–450)
TRANSFERRIN SERPL-MCNC: 156 MG/DL (ref 200–375)

## 2025-03-24 PROCEDURE — 85014 HEMATOCRIT: CPT

## 2025-03-24 PROCEDURE — 82248 BILIRUBIN DIRECT: CPT

## 2025-03-24 PROCEDURE — 36415 COLL VENOUS BLD VENIPUNCTURE: CPT

## 2025-03-24 PROCEDURE — 83540 ASSAY OF IRON: CPT

## 2025-03-24 PROCEDURE — 85018 HEMOGLOBIN: CPT

## 2025-03-25 ENCOUNTER — ANTI-COAG VISIT (OUTPATIENT)
Dept: CARDIOLOGY | Facility: CLINIC | Age: 65
End: 2025-03-25
Payer: MEDICAID

## 2025-03-25 ENCOUNTER — LAB VISIT (OUTPATIENT)
Dept: LAB | Facility: HOSPITAL | Age: 65
End: 2025-03-25
Attending: INTERNAL MEDICINE
Payer: MEDICAID

## 2025-03-25 ENCOUNTER — CLINICAL SUPPORT (OUTPATIENT)
Dept: REHABILITATION | Facility: HOSPITAL | Age: 65
End: 2025-03-25
Payer: MEDICAID

## 2025-03-25 DIAGNOSIS — Z95.811 LVAD (LEFT VENTRICULAR ASSIST DEVICE) PRESENT: Primary | ICD-10-CM

## 2025-03-25 DIAGNOSIS — Z95.811 LVAD (LEFT VENTRICULAR ASSIST DEVICE) PRESENT: ICD-10-CM

## 2025-03-25 DIAGNOSIS — I50.43 ACUTE ON CHRONIC COMBINED SYSTOLIC AND DIASTOLIC CHF, NYHA CLASS 4: ICD-10-CM

## 2025-03-25 DIAGNOSIS — Z95.811 PRESENCE OF VENTRICULAR ASSIST DEVICE: ICD-10-CM

## 2025-03-25 LAB
INR PPP: 2.9 (ref 0.8–1.2)
PROTHROMBIN TIME: 30.1 SECONDS (ref 9–12.5)

## 2025-03-25 PROCEDURE — 85610 PROTHROMBIN TIME: CPT

## 2025-03-25 PROCEDURE — 36415 COLL VENOUS BLD VENIPUNCTURE: CPT

## 2025-03-25 PROCEDURE — 93793 ANTICOAG MGMT PT WARFARIN: CPT | Mod: ,,,

## 2025-03-25 PROCEDURE — 97162 PT EVAL MOD COMPLEX 30 MIN: CPT | Mod: PN | Performed by: PHYSICAL THERAPIST

## 2025-03-25 PROCEDURE — 97530 THERAPEUTIC ACTIVITIES: CPT | Mod: PN | Performed by: PHYSICAL THERAPIST

## 2025-03-25 NOTE — PROGRESS NOTES
Outpatient Rehab    Physical Therapy Evaluation    Patient Name: Radha Abbott  MRN: [id number]  YOB: 1960  Encounter Date: 3/25/2025    Therapy Diagnosis:   Encounter Diagnoses   Name Primary?    LVAD (left ventricular assist device) present     Acute on chronic combined systolic and diastolic CHF, NYHA class 4      Physician: Walter Mcintyre MD    Physician Orders: Eval and Treat  Medical Diagnosis: LVAD (left ventricular assist device) present  Acute on chronic combined systolic and diastolic CHF, NYHA class 4    Visit # / Visits Authorized:  1 / 1  Insurance Authorization Period: 3/11/2025 to 3/11/2026  Date of Evaluation: 3/25/2025  Plan of Care Certification: 3/25/2025 to 05/23/2025     Time In:   930am  Time Out:  1020am  Total Time:   50 minutes  Total Billable Time:  50 minutes    Intake Outcome Measure for FOTO Survey    Therapist reviewed FOTO scores for Radha Abbott on 3/25/2025.   FOTO report - see Media section or FOTO account episode details.     Intake Score:  %         Subjective   History of Present Illness  Radha is a 64 y.o. male                  History of Present Condition/Illness: He is having lower back pain.  Anytime he bends forward he falls forward.  It mainly happens when he tries to get up off of the toilet.  He reports that it's been going on for about 6 months or so.  He had surgery in December of 2023 major heart surgery.  He has battery packs that he wears all day in the front of the body.  He had left ventricle assistive device put in to do what the heart is too weak to do.  At night he is connected to a machine.  Last year he had covid in April of 2024.  He hasn't been straight since.  He is on dialysis 3 times/week since having covid.     Activities of Daily Living  Social history was obtained from Patient.          Patient Responsibilities: Community mobility, Driving    Previously independent with activities of daily living? Yes     Currently independent with  activities of daily living? Yes          Previously independent with instrumental activities of daily living? Yes     Currently independent with instrumental activities of daily living? Yes              Pain     Patient reports a current pain level of 7/10.     Location: across the lower back  Clinical Progression (since onset): Unchanged  Pain Qualities: Aching, Discomfort  Pain-Relieving Factors: Change in position  Pain-Aggravating Factors: Movement, Other (Comment)  Other Pain-Aggravating Factors: bending forward         Living Arrangements  Living Situation  Living Arrangements: Family members        Employment  Patient does not report that: Does the patient's condition impact their ability to work?  Employment Status: Not employed          Past Medical History/Physical Systems Review:   Rdaha Abbott  has a past medical history of Aspiration pneumonia of both lungs, CAD (coronary artery disease), CHF (congestive heart failure), Delirium, Diabetes mellitus, HFrEF (heart failure with reduced ejection fraction), Hypoglycemia, ICD (implantable cardioverter-defibrillator) in place, LVAD (left ventricular assist device) present, MI, old, PAF (paroxysmal atrial fibrillation), Stage 4 chronic kidney disease, and Type 2 diabetes mellitus without complication, without long-term current use of insulin.    Radha Abbott  has a past surgical history that includes Cardiac surgery; Right heart catheterization (Right, 10/10/2023); Right heart catheterization (Right, 10/13/2023); Right heart catheterization (11/13/2023); Right heart catheterization (Right, 11/13/2023); Right heart catheterization (Right, 11/20/2023); Placement of [place] catheter with imaging guidance (11/20/2023); Insertion of intra-aortic balloon assist device (Right, 11/21/2023); Left ventricular assist device (Left, 12/1/2023); Sternotomy (N/A, 12/1/2023); valvuloplasty, mitral valve (N/A, 12/1/2023); Lysis of adhesions (12/1/2023); Aortic valvuloplasty  (N/A, 12/1/2023); closure (N/A, 12/1/2023); angioplasty-venous artery (Right, 12/1/2023); 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    Radha has a current medication list which includes the following prescription(s): amiodarone, amitriptyline, atorvastatin, megestrol, omega 3-dha-epa-fish oil, pregabalin, pulse oximeter, sevelamer carbonate, trazodone, venlafaxine, vitamin d, and warfarin.    Review of patient's allergies indicates:  No Known Allergies     Objective          Gait Observation:  walking with slow gina with RW    Sensation: Intact to light touch B LE's, all dermatomes    30 second sit to stand: 5 times    2 minute walk test: 128 feet    Strength:       L/E MMT Right Left Pain/Dysfunction with Movement   Hip Flexion 4/5 4/5    Hip Extension 3+/5 3+/5    Hip Abduction 3+/5 3+/5    Hip Adduction 4/5 4/5    Hip IR 4+/5 4+/5    Hip ER 4+/5 4+/5    Knee Flexion 4/5 4/5    Knee Extension 4/5 4/5    Ankle DF 4/5 4/5    Ankle PF 3+/5 3+/5    Ankle Inversion 4/5 4/5    Ankle Eversion 4/5 4/5          Range of Motion:  B hips/knees/ankles WNL in all directions          Treatment:        CPT Intervention  Low Back Duration / Intensity  03/25/2025   TA LTR 10x   TA TA isometric 10x    TA  Seated knee squeeze 10x    TA LAQ 10x                                   PLAN             CPT Codes available for Billing:   (--) minutes of Manual therapy (MT) to improve pain and ROM.  (25) minutes of Therapeutic Exercise (TE) to develop strength, endurance, range of motion, and flexibility.  (--) minutes of Neuromuscular Re-Education (NR)  to improve: Balance, Coordination, Kinesthetic, Sense, Proprioception, and Posture.  (--) minutes of Therapeutic Activities (TA)  to improve functional performance.  Unattended Electrical Stimulation (ES) for muscle performance or pain modulation.  Vasopneumatic Device Therapy () for management of swelling/edema. (66205)  BFR: Blood flow restriction applied during exercise  NP or (-): Not Performed       Assessment & Plan   Assessment  Ledric presents with a condition of Moderate complexity.   Presentation of Symptoms: Stable  Will Comorbidities Impact Care: Yes  Post covid, heart issues    Functional Limitations: Activity tolerance, Completing work/school activities, Completing self-care activities, Decreased ambulation distance/endurance, Disrupted sleep pattern, Gait limitations, Functional mobility, Pain with ADLs/IADLs, Range of motion, Standing tolerance  Impairments: Abnormal gait, Abnormal muscle tone, Abnormal or restricted range of motion, Activity intolerance, Impaired physical strength, Lack of appropriate home exercise program, Pain with functional activity    Patient Goal for Therapy (PT): patient would like to not have pain in his lower back and have better balance.  Prognosis: Good  Assessment Details: Patient presents with diagnosis of generalized weakness.  He presents with decreased lumbar Range of motion, pain in the lower back, difficulty with balance, weakness in BLEs, overall decreased stamina, and difficulty with daily activities.       Patient's spiritual, cultural, and educational needs considered and patient agreeable to plan of care and goals.           Goals:   Active       Ambulation/movement       Patient will walk 250 feet in 2 minutes       Start:  03/25/25    Expected End:  05/23/25               Functional outcome       Patient will show a significant change in FOTO patient-reported outcome tool to demonstrate subjective improvement       Start:  03/25/25    Expected End:  05/23/25            Patient stated goal: patient would like to not have pain in his lower back and have better balance.        Start:   03/25/25    Expected End:  05/23/25            Patient will demonstrate independence in home program for support of progression       Start:  03/25/25    Expected End:  04/18/25               Pain       Patient will report pain of 5/10 demonstrating a reduction of overall pain       Start:  03/25/25    Expected End:  05/23/25               Range of Motion       Patient will achieve spinal flexion to 75%       Start:  03/25/25    Expected End:  05/23/25            Patient will achieve spinal extension to 75%       Start:  03/25/25    Expected End:  05/23/25                Yolanda Rothman, PT, DPT

## 2025-03-25 NOTE — PROGRESS NOTES
[facility]    2025 3:23 PM    Assessment/Plan:    Patient presents today with therapeutic INR.    Assessment of patient findings and chart review: no significant findings     Recommendation for patient's warfarin regimen: Continue current maintenance dose    Recommend repeat INR in 1 week  _________________________________________________________________    Radha Abbott (64 y.o.) is followed by the [facility].    Anticoagulation Summary  As of 3/25/2025      INR goal:  2.0-3.0   TTR:  71.3% (11.1 mo)   INR used for dosin.9 (3/25/2025)   Warfarin maintenance plan:  1.25 mg (2.5 mg x 0.5) every Mon, Wed, Fri; 2.5 mg (2.5 mg x 1) all other days   Weekly warfarin total:  13.75 mg   Plan last modified:  Jesenia Jensen, PharmD (2025)   Next INR check:  2025   Target end date:  --    Indications    LVAD (left ventricular assist device) present [Z95.811]                 Anticoagulation Episode Summary       INR check location:  --    Preferred lab:  --    Send INR reminders to:  [facility] COUMADIN LVAD    Comments:  Cobos          Anticoagulation Care Providers       Provider Role Specialty Phone number    Sajan Hurley MD [facility] Cardiology [phone]

## 2025-04-01 ENCOUNTER — TELEPHONE (OUTPATIENT)
Dept: TRANSPLANT | Facility: CLINIC | Age: 65
End: 2025-04-01
Payer: MEDICAID

## 2025-04-01 ENCOUNTER — LAB VISIT (OUTPATIENT)
Dept: LAB | Facility: HOSPITAL | Age: 65
End: 2025-04-01
Attending: INTERNAL MEDICINE
Payer: MEDICAID

## 2025-04-01 ENCOUNTER — ANTI-COAG VISIT (OUTPATIENT)
Dept: CARDIOLOGY | Facility: CLINIC | Age: 65
End: 2025-04-01
Payer: MEDICAID

## 2025-04-01 DIAGNOSIS — Z95.811 LVAD (LEFT VENTRICULAR ASSIST DEVICE) PRESENT: Primary | ICD-10-CM

## 2025-04-01 DIAGNOSIS — D63.1 ANEMIA IN STAGE 4 CHRONIC KIDNEY DISEASE: ICD-10-CM

## 2025-04-01 DIAGNOSIS — N18.4 ANEMIA IN STAGE 4 CHRONIC KIDNEY DISEASE: ICD-10-CM

## 2025-04-01 DIAGNOSIS — Z95.811 PRESENCE OF VENTRICULAR ASSIST DEVICE: ICD-10-CM

## 2025-04-01 LAB
ABSOLUTE EOSINOPHIL (OHS): 0.24 K/UL
ABSOLUTE MONOCYTE (OHS): 0.34 K/UL (ref 0.3–1)
ABSOLUTE NEUTROPHIL COUNT (OHS): 5.12 K/UL (ref 1.8–7.7)
ALBUMIN SERPL BCP-MCNC: 2.5 G/DL (ref 3.5–5.2)
ALP SERPL-CCNC: 105 UNIT/L (ref 40–150)
ALT SERPL W/O P-5'-P-CCNC: 25 UNIT/L (ref 10–44)
ANION GAP (OHS): 9 MMOL/L (ref 8–16)
AST SERPL-CCNC: 22 UNIT/L (ref 11–45)
BASOPHILS # BLD AUTO: 0.04 K/UL
BASOPHILS NFR BLD AUTO: 0.6 %
BILIRUB SERPL-MCNC: 0.5 MG/DL (ref 0.1–1)
BNP SERPL-MCNC: 823 PG/ML (ref 0–99)
BUN SERPL-MCNC: 35 MG/DL (ref 8–23)
CALCIUM SERPL-MCNC: 8.5 MG/DL (ref 8.7–10.5)
CHLORIDE SERPL-SCNC: 106 MMOL/L (ref 95–110)
CO2 SERPL-SCNC: 23 MMOL/L (ref 23–29)
CREAT SERPL-MCNC: 6.5 MG/DL (ref 0.5–1.4)
CRP SERPL-MCNC: 65.7 MG/L
ERYTHROCYTE [DISTWIDTH] IN BLOOD BY AUTOMATED COUNT: 17 % (ref 11.5–14.5)
GFR SERPLBLD CREATININE-BSD FMLA CKD-EPI: 9 ML/MIN/1.73/M2
GLUCOSE SERPL-MCNC: 147 MG/DL (ref 70–110)
HCT VFR BLD AUTO: 31.5 % (ref 40–54)
HGB BLD-MCNC: 9.2 GM/DL (ref 14–18)
IMM GRANULOCYTES # BLD AUTO: 0.05 K/UL (ref 0–0.04)
IMM GRANULOCYTES NFR BLD AUTO: 0.8 % (ref 0–0.5)
INR PPP: 2.9 (ref 0.8–1.2)
IRON SATN MFR SERPL: 24 % (ref 20–50)
IRON SERPL-MCNC: 53 UG/DL (ref 45–160)
LYMPHOCYTES # BLD AUTO: 0.59 K/UL (ref 1–4.8)
MCH RBC QN AUTO: 25.5 PG (ref 27–31)
MCHC RBC AUTO-ENTMCNC: 29.2 G/DL (ref 32–36)
MCV RBC AUTO: 87 FL (ref 82–98)
NUCLEATED RBC (/100WBC) (OHS): 0 /100 WBC
PLATELET # BLD AUTO: 212 K/UL (ref 150–450)
PMV BLD AUTO: 8.9 FL (ref 9.2–12.9)
POTASSIUM SERPL-SCNC: 5.1 MMOL/L (ref 3.5–5.1)
PROT SERPL-MCNC: 8 GM/DL (ref 6–8.4)
PROTHROMBIN TIME: 30.3 SECONDS (ref 9–12.5)
RBC # BLD AUTO: 3.61 M/UL (ref 4.6–6.2)
RELATIVE EOSINOPHIL (OHS): 3.8 %
RELATIVE LYMPHOCYTE (OHS): 9.2 % (ref 18–48)
RELATIVE MONOCYTE (OHS): 5.3 % (ref 4–15)
RELATIVE NEUTROPHIL (OHS): 80.3 % (ref 38–73)
SODIUM SERPL-SCNC: 138 MMOL/L (ref 136–145)
TIBC SERPL-MCNC: 223 UG/DL (ref 250–450)
TRANSFERRIN SERPL-MCNC: 151 MG/DL (ref 200–375)
WBC # BLD AUTO: 6.38 K/UL (ref 3.9–12.7)

## 2025-04-01 PROCEDURE — 85610 PROTHROMBIN TIME: CPT

## 2025-04-01 PROCEDURE — 83880 ASSAY OF NATRIURETIC PEPTIDE: CPT

## 2025-04-01 PROCEDURE — 82040 ASSAY OF SERUM ALBUMIN: CPT

## 2025-04-01 PROCEDURE — 86140 C-REACTIVE PROTEIN: CPT

## 2025-04-01 PROCEDURE — 84075 ASSAY ALKALINE PHOSPHATASE: CPT

## 2025-04-01 PROCEDURE — 93793 ANTICOAG MGMT PT WARFARIN: CPT | Mod: ,,,

## 2025-04-01 PROCEDURE — 83540 ASSAY OF IRON: CPT

## 2025-04-01 PROCEDURE — 85025 COMPLETE CBC W/AUTO DIFF WBC: CPT

## 2025-04-01 PROCEDURE — 36415 COLL VENOUS BLD VENIPUNCTURE: CPT

## 2025-04-01 NOTE — TELEPHONE ENCOUNTER
Reviewed lab results with patient spouse. Reports that patient is feeling better today, not weak like yesterday or the day before. States he had some diarrhea yesterday that she gave him pepto bismol for, advised that this should not be given to VAD patients due to increased risk of bleeding. States he also had a few small drops of bright red blood in urine this am, nothing since then, no other signs of bleeding. Advised to page VAD Coordinator on call if patient begins to feel weak again or has any signs of bleeding, verbalized understanding.

## 2025-04-01 NOTE — PROGRESS NOTES
[facility]    2025 10:18 AM    Assessment/Plan:    Patient presents today with therapeutic INR.    Assessment of patient findings and chart review: no significant findings     Recommendation for patient's warfarin regimen: Continue current maintenance dose    Recommend repeat INR in 1 week  _________________________________________________________________    Radha Abbott (64 y.o.) is followed by the [facility].    Anticoagulation Summary  As of 2025      INR goal:  2.0-3.0   TTR:  71.8% (11.3 mo)   INR used for dosin.9 (2025)   Warfarin maintenance plan:  1.25 mg (2.5 mg x 0.5) every Mon, Wed, Fri; 2.5 mg (2.5 mg x 1) all other days   Weekly warfarin total:  13.75 mg   Plan last modified:  Jesenia Jensen, PharmD (2025)   Next INR check:  2025   Target end date:  --    Indications    LVAD (left ventricular assist device) present [Z95.811]                 Anticoagulation Episode Summary       INR check location:  --    Preferred lab:  --    Send INR reminders to:  [facility] COUMADIN LVAD    Comments:  Cobos          Anticoagulation Care Providers       Provider Role Specialty Phone number    Sajan Hurley MD [facility] Cardiology [phone]

## 2025-04-01 NOTE — PROGRESS NOTES
Arlyn wanted to make us aware pt felt weak over the weekend that she thinks was due to a road trip to [place] and that pt is feeling better now. Arlyn advised to monitor pt's urine, to call us back if the bleeding continues and is worsening, advised ED if he begins to feel weak or lightheaded. advised to avoid pepto bismol due to increased bleeding risk and use imodium instead. Arlyn verbalized understanding.

## 2025-04-01 NOTE — PROGRESS NOTES
Note reviewed. Will ask that he monitor and call us back if the bleeding continues and is worsening. Will advise ED if he begins to feel weak or lightheaded.    Will also advise to avoid pepto bismol and use imodium instead.

## 2025-04-01 NOTE — PROGRESS NOTES
Wife verified correct warfarin dose, reports this am noticed some bright red blood while urinating--states not a lot, reports some diarrhea 3/31 and today, took pepto bismol this am

## 2025-04-02 ENCOUNTER — HOSPITAL ENCOUNTER (EMERGENCY)
Facility: HOSPITAL | Age: 65
Discharge: OTHER INSTITUTION | End: 2025-04-02
Attending: EMERGENCY MEDICINE
Payer: MEDICAID

## 2025-04-02 ENCOUNTER — HOSPITAL ENCOUNTER (OUTPATIENT)
Facility: HOSPITAL | Age: 65
LOS: 1 days | Discharge: HOME | End: 2025-04-04
Attending: INTERNAL MEDICINE | Admitting: INTERNAL MEDICINE
Payer: MEDICAID

## 2025-04-02 VITALS
WEIGHT: 175 LBS | RESPIRATION RATE: 16 BRPM | DIASTOLIC BLOOD PRESSURE: 81 MMHG | BODY MASS INDEX: 23.73 KG/M2 | OXYGEN SATURATION: 100 % | HEART RATE: 80 BPM | TEMPERATURE: 99 F | SYSTOLIC BLOOD PRESSURE: 111 MMHG

## 2025-04-02 DIAGNOSIS — R29.6 FALLS: ICD-10-CM

## 2025-04-02 DIAGNOSIS — E83.39 HYPOPHOSPHATEMIA: ICD-10-CM

## 2025-04-02 DIAGNOSIS — R79.89 ELEVATED TROPONIN: ICD-10-CM

## 2025-04-02 DIAGNOSIS — E11.9 TYPE 2 DIABETES MELLITUS WITHOUT COMPLICATION, WITHOUT LONG-TERM CURRENT USE OF INSULIN: Primary | ICD-10-CM

## 2025-04-02 DIAGNOSIS — Z95.811 LVAD (LEFT VENTRICULAR ASSIST DEVICE) PRESENT: ICD-10-CM

## 2025-04-02 DIAGNOSIS — R53.1 WEAKNESS: ICD-10-CM

## 2025-04-02 DIAGNOSIS — R31.9 URINARY TRACT INFECTION WITH HEMATURIA, SITE UNSPECIFIED: ICD-10-CM

## 2025-04-02 DIAGNOSIS — N39.0 URINARY TRACT INFECTION WITH HEMATURIA, SITE UNSPECIFIED: ICD-10-CM

## 2025-04-02 DIAGNOSIS — N18.6 ESRD (END STAGE RENAL DISEASE) ON DIALYSIS: ICD-10-CM

## 2025-04-02 DIAGNOSIS — R55 SYNCOPE: ICD-10-CM

## 2025-04-02 DIAGNOSIS — R55 SYNCOPE: Primary | ICD-10-CM

## 2025-04-02 DIAGNOSIS — Z99.2 ESRD (END STAGE RENAL DISEASE) ON DIALYSIS: ICD-10-CM

## 2025-04-02 LAB
ABSOLUTE EOSINOPHIL (OHS): 0.18 K/UL
ABSOLUTE MONOCYTE (OHS): 0.26 K/UL (ref 0.3–1)
ABSOLUTE NEUTROPHIL COUNT (OHS): 4.55 K/UL (ref 1.8–7.7)
ALBUMIN SERPL BCP-MCNC: 2.5 G/DL (ref 3.5–5.2)
ALP SERPL-CCNC: 105 UNIT/L (ref 40–150)
ALT SERPL W/O P-5'-P-CCNC: 27 UNIT/L (ref 10–44)
ANION GAP (OHS): 10 MMOL/L (ref 8–16)
APTT PPP: 46.4 SECONDS (ref 21–32)
AST SERPL-CCNC: 25 UNIT/L (ref 11–45)
BACTERIA #/AREA URNS AUTO: ABNORMAL /HPF
BASOPHILS # BLD AUTO: 0.03 K/UL
BASOPHILS NFR BLD AUTO: 0.5 %
BILIRUB SERPL-MCNC: 0.4 MG/DL (ref 0.1–1)
BILIRUB UR QL STRIP.AUTO: NEGATIVE
BNP SERPL-MCNC: 559 PG/ML (ref 0–99)
BUN SERPL-MCNC: 20 MG/DL (ref 8–23)
CALCIUM SERPL-MCNC: 8.6 MG/DL (ref 8.7–10.5)
CHLORIDE SERPL-SCNC: 102 MMOL/L (ref 95–110)
CLARITY UR: ABNORMAL
CO2 SERPL-SCNC: 25 MMOL/L (ref 23–29)
COLOR UR AUTO: YELLOW
CREAT SERPL-MCNC: 4.5 MG/DL (ref 0.5–1.4)
ERYTHROCYTE [DISTWIDTH] IN BLOOD BY AUTOMATED COUNT: 17 % (ref 11.5–14.5)
GFR SERPLBLD CREATININE-BSD FMLA CKD-EPI: 14 ML/MIN/1.73/M2
GLUCOSE SERPL-MCNC: 202 MG/DL (ref 70–110)
GLUCOSE UR QL STRIP: NEGATIVE
HCT VFR BLD AUTO: 31.3 % (ref 40–54)
HGB BLD-MCNC: 9.4 GM/DL (ref 14–18)
HGB UR QL STRIP: ABNORMAL
HYALINE CASTS UR QL AUTO: 12 /LPF (ref 0–1)
IMM GRANULOCYTES # BLD AUTO: 0.05 K/UL (ref 0–0.04)
IMM GRANULOCYTES NFR BLD AUTO: 0.9 % (ref 0–0.5)
INR PPP: 2.8 (ref 0.8–1.2)
KETONES UR QL STRIP: NEGATIVE
LEUKOCYTE ESTERASE UR QL STRIP: ABNORMAL
LYMPHOCYTES # BLD AUTO: 0.46 K/UL (ref 1–4.8)
MAGNESIUM SERPL-MCNC: 1.9 MG/DL (ref 1.6–2.6)
MCH RBC QN AUTO: 25.9 PG (ref 27–31)
MCHC RBC AUTO-ENTMCNC: 30 G/DL (ref 32–36)
MCV RBC AUTO: 86 FL (ref 82–98)
MICROSCOPIC COMMENT: ABNORMAL
NITRITE UR QL STRIP: NEGATIVE
NUCLEATED RBC (/100WBC) (OHS): 0 /100 WBC
PH UR STRIP: 7 [PH]
PHOSPHATE SERPL-MCNC: 1.5 MG/DL (ref 2.7–4.5)
PLATELET # BLD AUTO: 238 K/UL (ref 150–450)
PMV BLD AUTO: 9.6 FL (ref 9.2–12.9)
POTASSIUM SERPL-SCNC: 4.2 MMOL/L (ref 3.5–5.1)
PROT SERPL-MCNC: 8 GM/DL (ref 6–8.4)
PROT UR QL STRIP: ABNORMAL
PROTHROMBIN TIME: 30.2 SECONDS (ref 9–12.5)
RBC # BLD AUTO: 3.63 M/UL (ref 4.6–6.2)
RBC #/AREA URNS AUTO: >100 /HPF (ref 0–4)
RELATIVE EOSINOPHIL (OHS): 3.3 %
RELATIVE LYMPHOCYTE (OHS): 8.3 % (ref 18–48)
RELATIVE MONOCYTE (OHS): 4.7 % (ref 4–15)
RELATIVE NEUTROPHIL (OHS): 82.3 % (ref 38–73)
SODIUM SERPL-SCNC: 137 MMOL/L (ref 136–145)
SP GR UR STRIP: 1.02
SQUAMOUS #/AREA URNS AUTO: 8 /HPF
TROPONIN I SERPL DL<=0.01 NG/ML-MCNC: 0.12 NG/ML
UROBILINOGEN UR STRIP-ACNC: NEGATIVE EU/DL
WBC # BLD AUTO: 5.53 K/UL (ref 3.9–12.7)
WBC #/AREA URNS AUTO: >100 /HPF (ref 0–5)

## 2025-04-02 PROCEDURE — 85730 THROMBOPLASTIN TIME PARTIAL: CPT | Performed by: EMERGENCY MEDICINE

## 2025-04-02 PROCEDURE — 85025 COMPLETE CBC W/AUTO DIFF WBC: CPT | Performed by: NURSE PRACTITIONER

## 2025-04-02 PROCEDURE — 25000003 PHARM REV CODE 250: Performed by: STUDENT IN AN ORGANIZED HEALTH CARE EDUCATION/TRAINING PROGRAM

## 2025-04-02 PROCEDURE — 93750 INTERROGATION VAD IN PERSON: CPT | Mod: ,,, | Performed by: INTERNAL MEDICINE

## 2025-04-02 PROCEDURE — 84484 ASSAY OF TROPONIN QUANT: CPT | Performed by: NURSE PRACTITIONER

## 2025-04-02 PROCEDURE — 27000248 HC VAD-ADDITIONAL DAY

## 2025-04-02 PROCEDURE — 87086 URINE CULTURE/COLONY COUNT: CPT | Performed by: EMERGENCY MEDICINE

## 2025-04-02 PROCEDURE — G0379 DIRECT REFER HOSPITAL OBSERV: HCPCS

## 2025-04-02 PROCEDURE — 83735 ASSAY OF MAGNESIUM: CPT | Performed by: EMERGENCY MEDICINE

## 2025-04-02 PROCEDURE — G0378 HOSPITAL OBSERVATION PER HR: HCPCS

## 2025-04-02 PROCEDURE — 93005 ELECTROCARDIOGRAM TRACING: CPT

## 2025-04-02 PROCEDURE — 81003 URINALYSIS AUTO W/O SCOPE: CPT | Performed by: EMERGENCY MEDICINE

## 2025-04-02 PROCEDURE — 99223 1ST HOSP IP/OBS HIGH 75: CPT | Mod: 25,,, | Performed by: INTERNAL MEDICINE

## 2025-04-02 PROCEDURE — 82040 ASSAY OF SERUM ALBUMIN: CPT | Performed by: NURSE PRACTITIONER

## 2025-04-02 PROCEDURE — 83880 ASSAY OF NATRIURETIC PEPTIDE: CPT | Performed by: EMERGENCY MEDICINE

## 2025-04-02 PROCEDURE — 84100 ASSAY OF PHOSPHORUS: CPT | Performed by: EMERGENCY MEDICINE

## 2025-04-02 PROCEDURE — 99285 EMERGENCY DEPT VISIT HI MDM: CPT | Mod: 25

## 2025-04-02 PROCEDURE — 93010 ELECTROCARDIOGRAM REPORT: CPT | Mod: ,,, | Performed by: INTERNAL MEDICINE

## 2025-04-02 PROCEDURE — 85610 PROTHROMBIN TIME: CPT | Performed by: NURSE PRACTITIONER

## 2025-04-02 RX ORDER — AMIODARONE HYDROCHLORIDE 200 MG/1
400 TABLET ORAL DAILY
Status: DISCONTINUED | OUTPATIENT
Start: 2025-04-03 | End: 2025-04-04 | Stop reason: HOSPADM

## 2025-04-02 RX ORDER — VENLAFAXINE 37.5 MG/1
37.5 TABLET ORAL DAILY
Status: DISCONTINUED | OUTPATIENT
Start: 2025-04-03 | End: 2025-04-04 | Stop reason: HOSPADM

## 2025-04-02 RX ORDER — AMITRIPTYLINE HYDROCHLORIDE 25 MG/1
25 TABLET, FILM COATED ORAL
Status: DISCONTINUED | OUTPATIENT
Start: 2025-04-02 | End: 2025-04-04 | Stop reason: HOSPADM

## 2025-04-02 RX ORDER — INSULIN ASPART 100 [IU]/ML
0-5 INJECTION, SOLUTION INTRAVENOUS; SUBCUTANEOUS
Status: DISCONTINUED | OUTPATIENT
Start: 2025-04-02 | End: 2025-04-04 | Stop reason: HOSPADM

## 2025-04-02 RX ORDER — IBUPROFEN 200 MG
16 TABLET ORAL
Status: DISCONTINUED | OUTPATIENT
Start: 2025-04-02 | End: 2025-04-04 | Stop reason: HOSPADM

## 2025-04-02 RX ORDER — ATORVASTATIN CALCIUM 40 MG/1
40 TABLET, FILM COATED ORAL NIGHTLY
Status: DISCONTINUED | OUTPATIENT
Start: 2025-04-02 | End: 2025-04-04 | Stop reason: HOSPADM

## 2025-04-02 RX ORDER — GLUCAGON 1 MG
1 KIT INJECTION
Status: DISCONTINUED | OUTPATIENT
Start: 2025-04-02 | End: 2025-04-04 | Stop reason: HOSPADM

## 2025-04-02 RX ORDER — PREGABALIN 50 MG/1
100 CAPSULE ORAL EVERY OTHER DAY
Status: DISCONTINUED | OUTPATIENT
Start: 2025-04-03 | End: 2025-04-04 | Stop reason: HOSPADM

## 2025-04-02 RX ORDER — IBUPROFEN 200 MG
24 TABLET ORAL
Status: DISCONTINUED | OUTPATIENT
Start: 2025-04-02 | End: 2025-04-04 | Stop reason: HOSPADM

## 2025-04-02 RX ORDER — WARFARIN 2.5 MG/1
2.5 TABLET ORAL
Status: DISCONTINUED | OUTPATIENT
Start: 2025-04-03 | End: 2025-04-04 | Stop reason: HOSPADM

## 2025-04-02 RX ADMIN — ATORVASTATIN CALCIUM 40 MG: 40 TABLET, FILM COATED ORAL at 10:04

## 2025-04-02 RX ADMIN — AMITRIPTYLINE HYDROCHLORIDE 25 MG: 25 TABLET, FILM COATED ORAL at 10:04

## 2025-04-02 NOTE — NURSING
1830 Report received from [facility]. Marilu VAD coordinator, paged to notify of patient's pending arrival to [facility]. Pending return call from coordinator.    1850 Alva returned page. Coordinators aware of patients admit.

## 2025-04-02 NOTE — ED PROVIDER NOTES
"SCRIBE #1 NOTE: I, Laurierussell Santiago, am scribing for, and in the presence of, Christa Manning DO. I have scribed the entire note.       History     Chief Complaint   Patient presents with    Fall     Syncopal episode/fall x one hour pta. Dialysis this am, LVAD patient, LVAD alarmed during episode. C/O lower back pain and weakness. Vomiting x one.     Review of patient's allergies indicates:  No Known Allergies      History of Present Illness     HPI    4/2/2025, 2:18 PM  History obtained from the patient, wife, and medical records      History of Present Illness: Radha Abbott is a 64 y.o. male patient with a PMHx of DM type 2, MI, CAD, HFrEF, ICD, CHF, CKD stage 4, PAF, and LVAD (placed Dec 2023) who presents to the Emergency Department for evaluation of syncopal fall which began 1 hour PTA. Pt finished his dialysis this morning; when he was getting out of the van, wife reports that he slid out of the car onto the ground with his LVAD alarming. She did not notice any head trauma. Pt states that he does not remember this event happening, but states that he feels lightheaded with "weakness and pain" in his lower back. For the last few days, pt has also had sxs of SOB, n/v/d(x2 days), and hematuria (this AM). Pt is on Warfarin. Symptoms are constant and moderate in severity. No mitigating or exacerbating factors reported. Patient denies any CP, fever, or abdominal pain at this time. No prior Tx specified.  No further complaints or concerns at this time.       Arrival mode: Personal Transportation    PCP: Vasu Kong MD        Past Medical History:  Past Medical History:   Diagnosis Date    Aspiration pneumonia of both lungs 06/17/2024    CAD (coronary artery disease)     CHF (congestive heart failure)     Delirium 06/16/2024    Diabetes mellitus     HFrEF (heart failure with reduced ejection fraction)     Hypoglycemia 06/12/2024    ICD (implantable cardioverter-defibrillator) in place     LVAD (left ventricular " assist device) present 12/01/2023    MI, old     PAF (paroxysmal atrial fibrillation) 10/11/2023    Stage 4 chronic kidney disease 02/15/2024    Type 2 diabetes mellitus without complication, without long-term current use of insulin 10/07/2023       Past Surgical History:  Past Surgical History:   Procedure Laterality Date    ANGIOPLASTY-VENOUS ARTERY Right 12/1/2023    Procedure: ANGIOPLASTY-VENOUS ARTERY, RIGHT FEMORAL;  Surgeon: Yuri Washington MD;  Location: [facility] OR [facility] FLR;  Service: Cardiovascular;  Laterality: Right;    AORTIC VALVULOPLASTY N/A 12/1/2023    Procedure: REPAIR, AORTIC VALVE;  Surgeon: Yuri Washington MD;  Location: [facility] OR [facility] FLR;  Service: Cardiovascular;  Laterality: N/A;    CARDIAC SURGERY      CLOSURE N/A 12/1/2023    Procedure: CLOSURE, TEMPORARY;  Surgeon: Yuri Washington MD;  Location: [facility] OR [facility] FLR;  Service: Cardiovascular;  Laterality: N/A;    DRAINAGE OF PLEURAL EFFUSION  12/4/2023    Procedure: DRAINAGE, PLEURAL EFFUSION;  Surgeon: Yuri Washington MD;  [facility];  Service: Cardiovascular;;    INSERTION OF GRAFT TO PERICARDIUM  12/4/2023    Procedure: INSERTION, GRAFT, PERICARDIUM;  Surgeon: Yuri Washington MD;  [facility];  Service: Cardiovascular;;    INSERTION OF INTRA-AORTIC BALLOON ASSIST DEVICE Right 11/21/2023    Procedure: INSERTION, INTRA-AORTIC BALLOON PUMP;  Surgeon: Finn Cohn MD;  Location: [facility] CATH LAB;  Service: Cardiology;  Laterality: Right;    LEFT VENTRICULAR ASSIST DEVICE Left 12/1/2023    Procedure: INSERTION-LEFT VENTRICULAR ASSIST DEVICE;  Surgeon: Yuri Washington MD;  [facility];  Service: Cardiovascular;  Laterality: Left;  REDO STERNOTOMY - REDO SAW NEEDED FOR CASE    LYSIS OF ADHESIONS  12/1/2023    Procedure: LYSIS, ADHESIONS;  Surgeon: Yuri Washington MD;  Location: [facility] OR 2ND FLR;  Service: Cardiovascular;;    PLACEMENT OF SWAN ROLANDO CATHETER WITH IMAGING GUIDANCE  11/20/2023    Procedure:  INSERTION, CATHETER, SWAN-ROLANDO, WITH IMAGING GUIDANCE;  Surgeon: Sajan Hurley MD;  Location: [facility] CATH LAB;  Service: Cardiology;;    REMOVAL OF TUNNELED CENTRAL VENOUS CATHETER (CVC) N/A 3/1/2024    Procedure: REMOVAL, CATHETER, CENTRAL VENOUS, TUNNELED;  Surgeon: Seble Aguilar MD;  Location: [facility] CATH LAB;  Service: Interventional Nephrology;  Laterality: N/A;    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 12/1/2023    Procedure: REPAIR, ANEURYSM, ARTERY, FEMORAL;  Surgeon: Yuri Washington MD;  [facility];  Service: Cardiovascular;  Laterality: Right;  Right Femoral Artery Repair    RIGHT HEART CATHETERIZATION Right 10/10/2023    Procedure: INSERTION, CATHETER, RIGHT HEART;  Surgeon: Bin Gandhi MD;  Location: [facility] CATH LAB;  Service: Cardiology;  Laterality: Right;    RIGHT HEART CATHETERIZATION Right 10/13/2023    Procedure: INSERTION, CATHETER, RIGHT HEART;  Surgeon: Walter Mcintyre MD;  Location: [facility] CATH LAB;  Service: Cardiology;  Laterality: Right;    RIGHT HEART CATHETERIZATION  11/13/2023    RIGHT HEART CATHETERIZATION Right 11/13/2023    Procedure: INSERTION, CATHETER, RIGHT HEART;  Surgeon: Juventino Bermudez Jr., MD;  Location: [facility] CATH LAB;  Service: Cardiology;  Laterality: Right;    RIGHT HEART CATHETERIZATION Right 11/20/2023    Procedure: INSERTION, CATHETER, RIGHT HEART;  Surgeon: Sajan Hurley MD;  Location: [facility] CATH LAB;  Service: Cardiology;  Laterality: Right;    RIGHT HEART CATHETERIZATION Right 1/22/2024    Procedure: INSERTION, CATHETER, RIGHT HEART;  Surgeon: Brayan Ocampo MD;  Location: [facility] CATH LAB;  Service: Cardiology;  Laterality: Right;    STERNAL WOUND CLOSURE N/A 12/4/2023    Procedure: CLOSURE, WOUND, STERNUM;  Surgeon: Yuri Washington MD;  [facility];  Service: Cardiovascular;  Laterality: N/A;    STERNOTOMY N/A 12/1/2023    Procedure: STERNOTOMY, REDO;  Surgeon: Yuri Washington MD;  [facility];   "Service: Cardiovascular;  Laterality: N/A;    VALVULOPLASTY, MITRAL VALVE N/A 12/1/2023    Procedure: VALVULOPLASTY, MITRAL VALVE;  Surgeon: Yuri Washington MD;  Location: [facility] OR [address] [place] [address];  Service: Cardiovascular;  Laterality: N/A;         Family History:  No family history on file.    Social History:  Social History     Tobacco Use    Smoking status: Former     Current packs/day: 0.50     Types: Cigarettes    Smokeless tobacco: Not on file   Substance and Sexual Activity    Alcohol use: Yes     Comment: rarely    Drug use: No    Sexual activity: Not Currently     Partners: Female        Review of Systems     Review of Systems   Constitutional:  Negative for fever.   Respiratory:  Positive for shortness of breath.    Cardiovascular:  Negative for chest pain.   Gastrointestinal:  Positive for diarrhea (x2 days), nausea and vomiting. Negative for abdominal pain.   Genitourinary:  Positive for hematuria (this AM).   Musculoskeletal:  Positive for back pain (lower).   Neurological:  Positive for syncope, weakness ("lower back") and light-headedness.      Physical Exam     Initial Vitals   BP Pulse Resp Temp SpO2   04/02/25 1407 04/02/25 1407 04/02/25 1303 04/02/25 1303 04/02/25 1303   (!) 97/56 82 20 98.9 °F (37.2 °C) 100 %      MAP       --                 Physical Exam  Nursing Notes and Vital Signs Reviewed.  Constitutional: Patient is in no acute distress. Appears older than stated age.   Head: Atraumatic. Normocephalic.  Eyes: PERRL. EOM intact. Conjunctivae are not pale. No scleral icterus.  ENT: Mucous membranes are moist. Oropharynx is clear and symmetric.    Neck: Supple. Full ROM.   Cardiovascular: Machine-like hum consistent with LVAD.  Pulmonary/Chest: No respiratory distress. Clear to auscultation bilaterally. No wheezing or rales.  Vas-Cath to R upper chest wall.  Abdominal: Soft and non-distended. There is no tenderness.  No rebound, guarding, or rigidity. Musculoskeletal: Moves all extremities. No " obvious deformities. Lower lumbar tenderness.  Skin: Warm and dry.  Neurological:  Alert, awake, and appropriate.  Normal speech.  No acute focal neurological deficits are appreciated.       ED Course   Procedures  ED Vital Signs:  Vitals:    04/02/25 1303 04/02/25 1407 04/02/25 1430 04/02/25 1510   BP:  (!) 97/56 (!) 85/62 100/78   Pulse:  82 81 79   Resp: 20 18 17 18   Temp: 98.9 °F (37.2 °C)      SpO2: 100%  98% 98%   Weight: 79.4 kg (175 lb)       04/02/25 1530 04/02/25 1630 04/02/25 1700 04/02/25 1751   BP: 119/81 106/82 116/77 100/73   Pulse: 79 78 81 81   Resp: 20 13 18 (!) 22   Temp:       SpO2: 98% 98% 100% 98%   Weight:        04/02/25 1830   BP: 111/81   Pulse: 80   Resp: 16   Temp:    SpO2: 100%   Weight:        Abnormal Lab Results:  Labs Reviewed   COMPREHENSIVE METABOLIC PANEL - Abnormal       Result Value    Sodium 137      Potassium 4.2      Chloride 102      CO2 25      Glucose 202 (*)     BUN 20      Creatinine 4.5 (*)     Calcium 8.6 (*)     Protein Total 8.0      Albumin 2.5 (*)     Bilirubin Total 0.4            AST 25      ALT 27      Anion Gap 10      eGFR 14 (*)    TROPONIN I - Abnormal    Troponin-I 0.116 (*)    PROTIME-INR - Abnormal    PT 30.2 (*)     INR 2.8 (*)    CBC WITH DIFFERENTIAL - Abnormal    WBC 5.53      RBC 3.63 (*)     HGB 9.4 (*)     HCT 31.3 (*)     MCV 86      MCH 25.9 (*)     MCHC 30.0 (*)     RDW 17.0 (*)     Platelet Count 238      MPV 9.6      Nucleated RBC 0      Neut % 82.3 (*)     Lymph % 8.3 (*)     Mono % 4.7      Eos % 3.3      Basophil % 0.5      Imm Grans % 0.9 (*)     Neut # 4.55      Lymph # 0.46 (*)     Mono # 0.26 (*)     Eos # 0.18      Baso # 0.03      Imm Grans # 0.05 (*)    B-TYPE NATRIURETIC PEPTIDE - Abnormal     (*)    URINALYSIS, REFLEX TO URINE CULTURE - Abnormal    Color, UA Yellow      Appearance, UA Hazy (*)     pH, UA 7.0      Spec Grav UA 1.020      Protein, UA 3+ (*)     Glucose, UA Negative      Ketones, UA Negative       Bilirubin, UA Negative      Blood, UA 3+ (*)     Nitrites, UA Negative      Urobilinogen, UA Negative      Leukocyte Esterase, UA 3+ (*)    APTT - Abnormal    PTT 46.4 (*)    PHOSPHORUS - Abnormal    Phosphorus Level 1.5 (*)    URINALYSIS MICROSCOPIC - Abnormal    RBC, UA >100 (*)     WBC, UA >100 (*)     Bacteria, UA Rare      Squamous Epithelial Cells, UA 8      Hyaline Casts, UA 12 (*)     Microscopic Comment       MAGNESIUM - Normal    Magnesium  1.9     CULTURE, URINE   CBC W/ AUTO DIFFERENTIAL    Narrative:     The following orders were created for panel order CBC auto differential.  Procedure                               Abnormality         Status                     ---------                               -----------         ------                     CBC with Differential[[id number]]       Abnormal            Final result                 Please view results for these tests on the individual orders.        All Lab Results:  Results for orders placed or performed during the hospital encounter of 04/02/25   Comprehensive metabolic panel    Collection Time: 04/02/25  2:02 PM   Result Value Ref Range    Sodium 137 136 - 145 mmol/L    Potassium 4.2 3.5 - 5.1 mmol/L    Chloride 102 95 - 110 mmol/L    CO2 25 23 - 29 mmol/L    Glucose 202 (H) 70 - 110 mg/dL    BUN 20 8 - 23 mg/dL    Creatinine 4.5 (H) 0.5 - 1.4 mg/dL    Calcium 8.6 (L) 8.7 - 10.5 mg/dL    Protein Total 8.0 6.0 - 8.4 gm/dL    Albumin 2.5 (L) 3.5 - 5.2 g/dL    Bilirubin Total 0.4 0.1 - 1.0 mg/dL     40 - 150 unit/L    AST 25 11 - 45 unit/L    ALT 27 10 - 44 unit/L    Anion Gap 10 8 - 16 mmol/L    eGFR 14 (L) >60 mL/min/1.73/m2   Troponin I    Collection Time: 04/02/25  2:02 PM   Result Value Ref Range    Troponin-I 0.116 (H) <=0.026 ng/mL   Protime-INR    Collection Time: 04/02/25  2:02 PM   Result Value Ref Range    PT 30.2 (H) 9.0 - 12.5 seconds    INR 2.8 (H) 0.8 - 1.2   CBC with Differential    Collection Time: 04/02/25  2:02 PM   Result  Value Ref Range    WBC 5.53 3.90 - 12.70 K/uL    RBC 3.63 (L) 4.60 - 6.20 M/uL    HGB 9.4 (L) 14.0 - 18.0 gm/dL    HCT 31.3 (L) 40.0 - 54.0 %    MCV 86 82 - 98 fL    MCH 25.9 (L) 27.0 - 31.0 pg    MCHC 30.0 (L) 32.0 - 36.0 g/dL    RDW 17.0 (H) 11.5 - 14.5 %    Platelet Count 238 150 - 450 K/uL    MPV 9.6 9.2 - 12.9 fL    Nucleated RBC 0 <=0 /100 WBC    Neut % 82.3 (H) 38 - 73 %    Lymph % 8.3 (L) 18 - 48 %    Mono % 4.7 4 - 15 %    Eos % 3.3 <=8 %    Basophil % 0.5 <=1.9 %    Imm Grans % 0.9 (H) 0.0 - 0.5 %    Neut # 4.55 1.8 - 7.7 K/uL    Lymph # 0.46 (L) 1 - 4.8 K/uL    Mono # 0.26 (L) 0.3 - 1 K/uL    Eos # 0.18 <=0.5 K/uL    Baso # 0.03 <=0.2 K/uL    Imm Grans # 0.05 (H) 0.00 - 0.04 K/uL   APTT    Collection Time: 04/02/25  2:02 PM   Result Value Ref Range    PTT 46.4 (H) 21.0 - 32.0 seconds   Magnesium    Collection Time: 04/02/25  2:02 PM   Result Value Ref Range    Magnesium  1.9 1.6 - 2.6 mg/dL   Phosphorus    Collection Time: 04/02/25  2:02 PM   Result Value Ref Range    Phosphorus Level 1.5 (L) 2.7 - 4.5 mg/dL   Brain natriuretic peptide    Collection Time: 04/02/25  2:36 PM   Result Value Ref Range     (H) 0 - 99 pg/mL   EKG 12-lead    Collection Time: 04/02/25  2:59 PM   Result Value Ref Range    QRS Duration 168 ms    [facility] QTC Calculation 629 ms   Urinalysis, Reflex to Urine Culture    Collection Time: 04/02/25  5:12 PM    Specimen: Urine   Result Value Ref Range    Color, UA Yellow Straw, Brenda, Yellow, Light-Orange    Appearance, UA Hazy (A) Clear    pH, UA 7.0 5.0 - 8.0    Spec Grav UA 1.020 1.005 - 1.030    Protein, UA 3+ (A) Negative    Glucose, UA Negative Negative    Ketones, UA Negative Negative    Bilirubin, UA Negative Negative    Blood, UA 3+ (A) Negative    Nitrites, UA Negative Negative    Urobilinogen, UA Negative <2.0 EU/dL    Leukocyte Esterase, UA 3+ (A) Negative   Urinalysis Microscopic    Collection Time: 04/02/25  5:12 PM   Result Value Ref Range    RBC, UA >100 (H) 0 - 4 /HPF     WBC, UA >100 (H) 0 - 5 /HPF    Bacteria, UA Rare None, Rare, Occasional /HPF    Squamous Epithelial Cells, UA 8 /HPF    Hyaline Casts, UA 12 (H) 0 - 1 /LPF    Microscopic Comment         Imaging Results:  Imaging Results              CT Head Without Contrast (Final result)  Result time 04/02/25 15:04:49      Final result by Boris Villarreal MD (04/02/25 15:04:49)                   Impression:      Age-related atrophy.  No acute findings.      Electronically signed by: Boris Villarreal MD  Date:    04/02/2025  Time:    15:04               Narrative:    EXAMINATION:  CT HEAD WITHOUT CONTRAST    CLINICAL HISTORY:  Syncope, recurrent;    TECHNIQUE:  Standard noncontrast CT of the brain.    All CT scans at this facility are performed  using dose modulation techniques as appropriate to performed exam including the following:  automated exposure control; adjustment of mA and/or kV according to the patients size (this includes techniques or standardized protocols for targeted exams where dose is matched to indication/reason for exam: i.e. extremities or head);  iterative reconstruction technique.    COMPARISON:  March 9, 2025    FINDINGS:  Brain: The ventricles are mildly enlarged consistent with volume loss.  No acute edema, hemorrhage or mass effect is present.    There is however a small chronic area of medial left occipital encephalomalacia consistent with a chronic infarct.  Also the small cystic area in the left parietal lobe white matter is unchanged    Skull: The skull base is intact.                                       X-Ray Lumbar Spine Ap And Lateral (Final result)  Result time 04/02/25 14:56:16      Final result by Ludy Don MD (Timothy) (04/02/25 14:56:16)                   Impression:      Negative lumbar spine x-ray.      Electronically signed by: Ludy Don MD  Date:    04/02/2025  Time:    14:56               Narrative:    EXAMINATION:  XR LUMBAR SPINE AP AND LATERAL    CLINICAL  HISTORY:  ,fall;    TECHNIQUE:  Standard lumbar spine x-ray.  Four views    COMPARISON:  None    FINDINGS:  Lumbar vertebral reveal normal alignment, bone density in architecture.    No acute findings.                                       X-Ray Chest 1 View (Final result)  Result time 04/02/25 14:53:20      Final result by Boris Villarreal MD (04/02/25 14:53:20)                   Impression:      See above.      Electronically signed by: Boris Vilalrreal MD  Date:    04/02/2025  Time:    14:53               Narrative:    EXAMINATION:  XR CHEST 1 VIEW    CLINICAL HISTORY:  Respiratory distress.,    COMPARISON:  March 10, 2025    FINDINGS:  Sternal wires are present.  A left ventricular assist device is present.  A left AICD is present.  A right vascular catheter is noted as well.    Heart size is normal.    Lung fields are clear with small left pleural effusion, similar to the previous study.                                       The EKG was ordered, reviewed, and independently interpreted by the ED provider.  Interpretation time: 14:59  Rate: 80 BPM  Rhythm:  Wide QRS rhythm  Interpretation: Nonspecific intraventricular block. Possible Lateral infarct ,age undetermined. Inferior infarct ,age undetermined. No STEMI.           The Emergency Provider reviewed the vital signs and test results, which are outlined above.     ED Discussion     4:38 PM: Discussed pt's case with Dr. Ocampo (Heart transplant) who recommends stat transfer via flight; if no beds, ED to ED.    4:39 PM: Consult with Dr. Ocampo (Heart transplant) at [facility] concerning pt. There are no LVAD services, which the patient requires, offered at [facility] at this time. Dr. Ocampo expresses understanding and will accept transfer for [facility].  Accepting Facility: [facility]  Accepting Physician: Dr. Ocampo    4:39 PM: Re-evaluated pt. Informed patient/family/caretaker that there are no LVAD  services available at this time. I have discussed test results, shared treatment plan, and the need for transfer with patient and family at bedside. All historical, clinical, radiographic, and laboratory findings were reviewed with the patient/family/caretaker in detail. Pt needs to be transported via flight. Patient/family/caretaker understands that there could be unforeseen vehicle accidents or loss of vital signs that could result in potential death or permanent disability. Pt and/or family/caretaker express understanding at this time and agree with all information. All questions answered. Pt and/or family/caretaker have no further questions or concerns at this time. Patient is ready for transfer.        ED Course as of 04/03/25 2349   Thu Apr 03, 2025   2343 64-year-old male who currently has not LVAD on warfarin and a h/o DM type 2, MI, CAD, HFrEF, ICD, CHF, CKD stage 4, and PAF presents to the ER with syncopal episode.  EKG shows wide complex QRS.  Troponin elevated, chronic.  INR therapeutic.  End-stage renal disease noted with no hypophosphatemia, otherwise normal electrolytes.  H&H is at baseline.  No fever or leukocytosis.  Chest x-ray shows a chronic left-sided pleural effusion.  No lumbar fractures noted.  No intracranial hemorrhage.  .  UA with blood, white blood cells, and leukocyte esterase.  Perhaps infectious etiology cause syncopal episode.  I will defer treatment to admitting team. [NF]      ED Course User Index  [NF] Christa Manning, DO     Medical Decision Making  Amount and/or Complexity of Data Reviewed  Labs: ordered. Decision-making details documented in ED Course.  Radiology: ordered. Decision-making details documented in ED Course.  ECG/medicine tests: ordered and independent interpretation performed. Decision-making details documented in ED Course.    Risk  Decision regarding hospitalization.  Risk Details: Ddx: Orthostatic hypotension, vasovagal syncope, carotid sinus syndrome,  cardiac syncope including myocardial infarction, bradyarrhythmias tachyarrhythmias, hypoglycemia, seizure, transient ischemic attack, electrolyte abnormality                  ED Medication(s):  Medications - No data to display    Discharge Medication List as of 4/2/2025  7:05 PM                  Scribe Attestation:   Scribe #1: I performed the above scribed service and the documentation accurately describes the services I performed. I attest to the accuracy of the note.     Attending:   Physician Attestation Statement for Scribe #1: I, Christa Manning DO, personally performed the services described in this documentation, as scribed by Laurie Santiago, in my presence, and it is both accurate and complete.           Clinical Impression       ICD-10-CM ICD-9-CM   1. Syncope  R55 780.2   2. Weakness  R53.1 780.79   3. LVAD (left ventricular assist device) present  Z95.811 V43.21   4. ESRD (end stage renal disease) on dialysis  N18.6 585.6    Z99.2 V45.11   5. Elevated troponin  R79.89 790.6   6. Hypophosphatemia  E83.39 275.3   7. Urinary tract infection with hematuria, site unspecified  N39.0 599.0    R31.9 599.70       Disposition:   Disposition: Transferred  Condition: Serious       Christa Manning DO  04/03/25 1309

## 2025-04-02 NOTE — FIRST PROVIDER EVALUATION
Medical screening examination initiated.  I have conducted a focused provider triage encounter, findings are as follows:    Brief history of present illness:  64-year-old male with complaint of syncopal episode after dialysis earlier today.  Patient reports he was getting in his truck and he had a syncopal episode and slipped to the ground.  Patient does report lower back pain. Pt is an LVAD patient.      Vitals:    04/02/25 1303   BP: (S)   Comment: LVAD   Pulse: (S)   Comment: LVAD   Resp: 20   Temp: 98.9 °F (37.2 °C)   SpO2: 100%   Weight: 79.4 kg (175 lb)       Pertinent physical exam:  alert, NAD, mild lower lumbar tenderness    Preliminary workup initiated; this workup will be continued and followed by the physician or advanced practice provider that is assigned to the patient when roomed.

## 2025-04-03 LAB
ABSOLUTE EOSINOPHIL (OHS): 0.2 K/UL
ABSOLUTE MONOCYTE (OHS): 0.46 K/UL (ref 0.3–1)
ABSOLUTE NEUTROPHIL COUNT (OHS): 3.65 K/UL (ref 1.8–7.7)
ANION GAP (OHS): 8 MMOL/L (ref 8–16)
APTT PPP: 47.7 SECONDS (ref 21–32)
ASCENDING AORTA: 3.07 CM
BASOPHILS # BLD AUTO: 0.03 K/UL
BASOPHILS NFR BLD AUTO: 0.6 %
BSA FOR ECHO PROCEDURE: 2.04 M2
BUN SERPL-MCNC: 23 MG/DL (ref 8–23)
CALCIUM SERPL-MCNC: 8.4 MG/DL (ref 8.7–10.5)
CHLORIDE SERPL-SCNC: 106 MMOL/L (ref 95–110)
CO2 SERPL-SCNC: 22 MMOL/L (ref 23–29)
CREAT SERPL-MCNC: 5.2 MG/DL (ref 0.5–1.4)
CV ECHO LV RWT: 0.36 CM
DOP CALC LVOT AREA: 3.5 CM2
DOP CALC LVOT DIAMETER: 2.1 CM
E WAVE DECELERATION TIME: 263 MS
E/A RATIO: 0.69
E/E' RATIO: 16 M/S
ECHO EF ESTIMATED: 8 %
ECHO LV POSTERIOR WALL: 0.9 CM (ref 0.6–1.1)
ERYTHROCYTE [DISTWIDTH] IN BLOOD BY AUTOMATED COUNT: 17.2 % (ref 11.5–14.5)
FRACTIONAL SHORTENING: 4 % (ref 28–44)
GFR SERPLBLD CREATININE-BSD FMLA CKD-EPI: 12 ML/MIN/1.73/M2
GLUCOSE SERPL-MCNC: 109 MG/DL (ref 70–110)
HCT VFR BLD AUTO: 29.2 % (ref 40–54)
HGB BLD-MCNC: 8.6 GM/DL (ref 14–18)
IMM GRANULOCYTES # BLD AUTO: 0.04 K/UL (ref 0–0.04)
IMM GRANULOCYTES NFR BLD AUTO: 0.8 % (ref 0–0.5)
INR PPP: 3.3 (ref 0.8–1.2)
INTERVENTRICULAR SEPTUM: 0.9 CM (ref 0.6–1.1)
IVC DIAMETER: 2.4 CM
LA MAJOR: 5.3 CM
LA MINOR: 4.3 CM
LA WIDTH: 3.6 CM
LDH SERPL-CCNC: 352 U/L (ref 110–260)
LEFT ATRIUM SIZE: 3.9 CM
LEFT ATRIUM VOLUME INDEX MOD: 29 ML/M2
LEFT ATRIUM VOLUME INDEX: 28 ML/M2
LEFT ATRIUM VOLUME MOD: 59 ML
LEFT ATRIUM VOLUME: 57 CM3
LEFT INTERNAL DIMENSION IN SYSTOLE: 4.8 CM (ref 2.1–4)
LEFT VENTRICLE DIASTOLIC VOLUME INDEX: 57.56 ML/M2
LEFT VENTRICLE DIASTOLIC VOLUME: 118 ML
LEFT VENTRICLE MASS INDEX: 77.2 G/M2
LEFT VENTRICLE SYSTOLIC VOLUME INDEX: 52.7 ML/M2
LEFT VENTRICLE SYSTOLIC VOLUME: 108 ML
LEFT VENTRICULAR INTERNAL DIMENSION IN DIASTOLE: 5 CM (ref 3.5–6)
LEFT VENTRICULAR MASS: 158.2 G
LV LATERAL E/E' RATIO: 14
LV SEPTAL E/E' RATIO: 17.5
LVAD BASE RATE: 5000 RPM
LYMPHOCYTES # BLD AUTO: 0.64 K/UL (ref 1–4.8)
MAGNESIUM SERPL-MCNC: 1.9 MG/DL (ref 1.6–2.6)
MCH RBC QN AUTO: 25.4 PG (ref 27–31)
MCHC RBC AUTO-ENTMCNC: 29.5 G/DL (ref 32–36)
MCV RBC AUTO: 86 FL (ref 82–98)
MV PEAK A VEL: 1.01 M/S
MV PEAK E VEL: 0.7 M/S
NUCLEATED RBC (/100WBC) (OHS): 0 /100 WBC
OHS QRS DURATION: 168 MS
OHS QTC CALCULATION: 629 MS
PHOSPHATE SERPL-MCNC: 1.9 MG/DL (ref 2.7–4.5)
PISA TR MAX VEL: 2.2 M/S
PLATELET # BLD AUTO: 196 K/UL (ref 150–450)
PMV BLD AUTO: 9.8 FL (ref 9.2–12.9)
POCT GLUCOSE: 111 MG/DL (ref 70–110)
POCT GLUCOSE: 121 MG/DL (ref 70–110)
POCT GLUCOSE: 134 MG/DL (ref 70–110)
POCT GLUCOSE: 141 MG/DL (ref 70–110)
POTASSIUM SERPL-SCNC: 4.2 MMOL/L (ref 3.5–5.1)
PROTHROMBIN TIME: 32.9 SECONDS (ref 9–12.5)
RA PRESSURE ESTIMATED: 8 MMHG
RBC # BLD AUTO: 3.39 M/UL (ref 4.6–6.2)
RELATIVE EOSINOPHIL (OHS): 4 %
RELATIVE LYMPHOCYTE (OHS): 12.7 % (ref 18–48)
RELATIVE MONOCYTE (OHS): 9.2 % (ref 4–15)
RELATIVE NEUTROPHIL (OHS): 72.7 % (ref 38–73)
RV TB RVSP: 10 MMHG
RV TISSUE DOPPLER FREE WALL SYSTOLIC VELOCITY 1 (APICAL 4 CHAMBER VIEW): 6.15 CM/S
SINUS: 3.27 CM
SODIUM SERPL-SCNC: 136 MMOL/L (ref 136–145)
STJ: 2.83 CM
TDI LATERAL: 0.05 M/S
TDI SEPTAL: 0.04 M/S
TDI: 0.05 M/S
TRICUSPID ANNULAR PLANE SYSTOLIC EXCURSION: 1.23 CM
TV PEAK GRADIENT: 20 MMHG
TV REST PULMONARY ARTERY PRESSURE: 27 MMHG
WBC # BLD AUTO: 5.02 K/UL (ref 3.9–12.7)
Z-SCORE OF LEFT VENTRICULAR DIMENSION IN END DIASTOLE: -2.07
Z-SCORE OF LEFT VENTRICULAR DIMENSION IN END SYSTOLE: 1.92

## 2025-04-03 PROCEDURE — 85025 COMPLETE CBC W/AUTO DIFF WBC: CPT | Performed by: STUDENT IN AN ORGANIZED HEALTH CARE EDUCATION/TRAINING PROGRAM

## 2025-04-03 PROCEDURE — G0378 HOSPITAL OBSERVATION PER HR: HCPCS

## 2025-04-03 PROCEDURE — 87040 BLOOD CULTURE FOR BACTERIA: CPT | Mod: 91 | Performed by: PHYSICIAN ASSISTANT

## 2025-04-03 PROCEDURE — 96361 HYDRATE IV INFUSION ADD-ON: CPT

## 2025-04-03 PROCEDURE — 99233 SBSQ HOSP IP/OBS HIGH 50: CPT | Mod: ,,, | Performed by: PHYSICIAN ASSISTANT

## 2025-04-03 PROCEDURE — 85730 THROMBOPLASTIN TIME PARTIAL: CPT | Performed by: STUDENT IN AN ORGANIZED HEALTH CARE EDUCATION/TRAINING PROGRAM

## 2025-04-03 PROCEDURE — 97112 NEUROMUSCULAR REEDUCATION: CPT

## 2025-04-03 PROCEDURE — 99214 OFFICE O/P EST MOD 30 MIN: CPT | Mod: ,,, | Performed by: NURSE PRACTITIONER

## 2025-04-03 PROCEDURE — 94799 UNLISTED PULMONARY SVC/PX: CPT

## 2025-04-03 PROCEDURE — 99214 OFFICE O/P EST MOD 30 MIN: CPT | Mod: ,,,

## 2025-04-03 PROCEDURE — 84100 ASSAY OF PHOSPHORUS: CPT | Performed by: STUDENT IN AN ORGANIZED HEALTH CARE EDUCATION/TRAINING PROGRAM

## 2025-04-03 PROCEDURE — 97530 THERAPEUTIC ACTIVITIES: CPT

## 2025-04-03 PROCEDURE — 36415 COLL VENOUS BLD VENIPUNCTURE: CPT | Performed by: PHYSICIAN ASSISTANT

## 2025-04-03 PROCEDURE — 25000003 PHARM REV CODE 250

## 2025-04-03 PROCEDURE — 25000003 PHARM REV CODE 250: Performed by: PHYSICIAN ASSISTANT

## 2025-04-03 PROCEDURE — 27000248 HC VAD-ADDITIONAL DAY

## 2025-04-03 PROCEDURE — 83735 ASSAY OF MAGNESIUM: CPT | Performed by: STUDENT IN AN ORGANIZED HEALTH CARE EDUCATION/TRAINING PROGRAM

## 2025-04-03 PROCEDURE — 96374 THER/PROPH/DIAG INJ IV PUSH: CPT

## 2025-04-03 PROCEDURE — 36415 COLL VENOUS BLD VENIPUNCTURE: CPT | Performed by: STUDENT IN AN ORGANIZED HEALTH CARE EDUCATION/TRAINING PROGRAM

## 2025-04-03 PROCEDURE — 97161 PT EVAL LOW COMPLEX 20 MIN: CPT

## 2025-04-03 PROCEDURE — 97165 OT EVAL LOW COMPLEX 30 MIN: CPT

## 2025-04-03 PROCEDURE — 93750 INTERROGATION VAD IN PERSON: CPT | Mod: ,,, | Performed by: INTERNAL MEDICINE

## 2025-04-03 PROCEDURE — 25000003 PHARM REV CODE 250: Performed by: STUDENT IN AN ORGANIZED HEALTH CARE EDUCATION/TRAINING PROGRAM

## 2025-04-03 PROCEDURE — 80048 BASIC METABOLIC PNL TOTAL CA: CPT | Performed by: STUDENT IN AN ORGANIZED HEALTH CARE EDUCATION/TRAINING PROGRAM

## 2025-04-03 PROCEDURE — 63600175 PHARM REV CODE 636 W HCPCS: Performed by: STUDENT IN AN ORGANIZED HEALTH CARE EDUCATION/TRAINING PROGRAM

## 2025-04-03 PROCEDURE — 96360 HYDRATION IV INFUSION INIT: CPT | Mod: 59

## 2025-04-03 PROCEDURE — 83615 LACTATE (LD) (LDH) ENZYME: CPT | Performed by: STUDENT IN AN ORGANIZED HEALTH CARE EDUCATION/TRAINING PROGRAM

## 2025-04-03 PROCEDURE — 85610 PROTHROMBIN TIME: CPT | Performed by: STUDENT IN AN ORGANIZED HEALTH CARE EDUCATION/TRAINING PROGRAM

## 2025-04-03 RX ORDER — SODIUM,POTASSIUM PHOSPHATES 280-250MG
2 POWDER IN PACKET (EA) ORAL 2 TIMES DAILY
Status: COMPLETED | OUTPATIENT
Start: 2025-04-03 | End: 2025-04-03

## 2025-04-03 RX ORDER — HYDRALAZINE HYDROCHLORIDE 20 MG/ML
5 INJECTION INTRAMUSCULAR; INTRAVENOUS ONCE
Status: COMPLETED | OUTPATIENT
Start: 2025-04-03 | End: 2025-04-03

## 2025-04-03 RX ORDER — MUPIROCIN 20 MG/G
OINTMENT TOPICAL 2 TIMES DAILY
Status: DISCONTINUED | OUTPATIENT
Start: 2025-04-03 | End: 2025-04-04 | Stop reason: HOSPADM

## 2025-04-03 RX ORDER — GUAIFENESIN 600 MG/1
600 TABLET, EXTENDED RELEASE ORAL 2 TIMES DAILY
Status: DISCONTINUED | OUTPATIENT
Start: 2025-04-03 | End: 2025-04-04 | Stop reason: HOSPADM

## 2025-04-03 RX ADMIN — GUAIFENESIN 600 MG: 600 TABLET, EXTENDED RELEASE ORAL at 09:04

## 2025-04-03 RX ADMIN — PREGABALIN 100 MG: 50 CAPSULE ORAL at 08:04

## 2025-04-03 RX ADMIN — WARFARIN SODIUM 2.5 MG: 2.5 TABLET ORAL at 05:04

## 2025-04-03 RX ADMIN — MUPIROCIN: 20 OINTMENT TOPICAL at 08:04

## 2025-04-03 RX ADMIN — HYDRALAZINE HYDROCHLORIDE 5 MG: 20 INJECTION, SOLUTION INTRAMUSCULAR; INTRAVENOUS at 08:04

## 2025-04-03 RX ADMIN — VENLAFAXINE 37.5 MG: 37.5 TABLET ORAL at 08:04

## 2025-04-03 RX ADMIN — ATORVASTATIN CALCIUM 40 MG: 40 TABLET, FILM COATED ORAL at 08:04

## 2025-04-03 RX ADMIN — AMIODARONE HYDROCHLORIDE 400 MG: 200 TABLET ORAL at 08:04

## 2025-04-03 RX ADMIN — GUAIFENESIN 600 MG: 600 TABLET, EXTENDED RELEASE ORAL at 08:04

## 2025-04-03 RX ADMIN — MUPIROCIN: 20 OINTMENT TOPICAL at 10:04

## 2025-04-03 RX ADMIN — POTASSIUM & SODIUM PHOSPHATES POWDER PACK 280-160-250 MG 2 PACKET: 280-160-250 PACK at 08:04

## 2025-04-03 RX ADMIN — SODIUM CHLORIDE 250 ML: 9 INJECTION, SOLUTION INTRAVENOUS at 12:04

## 2025-04-03 NOTE — CARE UPDATE
Unit ENRRIQUE Care Support Interaction      I have reviewed the chart of Radha Abbott who is hospitalized for <principal problem not specified>. The patient is currently located in the following unit: CSU        I have assisted the primary physician in management of the following:      Central Line - Present on admission to the unit - ordered blood cultures  MRSA Decolonization - Mupirocin ordered and CHG ordered     Vero Lozada PA-C  Unit Based ENRRIQUE

## 2025-04-03 NOTE — NURSING
Patient admitted to [facility] from [facility].  Patient arrives to unit via stretcher.  AAOX4, VSS , oriented to surroundings. Bed in lowest position. Call system within reach.  Side rails up x 2.  LVAD coordinator, MD with HTS and telemetry notified patient is in room.

## 2025-04-03 NOTE — ASSESSMENT & PLAN NOTE
On MWF HD.  S/p HD today with removal of 2.5 L  Nephrology on board- appreciate assistance for potential HD if cont to be hospitalized until Friday.

## 2025-04-03 NOTE — HPI
Reason for Consult: Management of T2DM, Hyperglycemia      Surgical Procedure and Date: HM3 placement as DT (12/1/23)      Diabetes diagnosis year: several years ago     Home Diabetes Medications:   Currently not taking meds       How often checking glucose at home? Once daily in the AM    BG readings on regimen: 70-130s  Hypoglycemia on the regimen?  No  Missed doses on regimen?  No     Diabetes Complications include:     Hypoglycemia , Diabetic nephropathy  , Diabetic chronic kidney disease     , and Diabetic peripheral neuropathy      Complicating diabetes co morbidities:   CHF and CKD        HPI: 65 y/o AAM w/ PMHx of stage D HFrEF 2/2 ICM s/p HM3 as DT (12/1/2023), ESRD on HD (MWF), and debility who presented to hospital as transfer from ED in [place] for syncope. He had a recent hospitalization at [facility] from 3/10-3/13/25 for falls.Endocrine consulted to manage hyperglycemia in the inpatient setting.     Lab Results   Component Value Date    HGBA1C 6.5 (H) 03/10/2025

## 2025-04-03 NOTE — SUBJECTIVE & OBJECTIVE
Interval History: Admitted yesterday due to syncopal episode follwing HD. This AM with standing to work with PT/OT he got light headed and LVAD flows dropped to 2.3, flows quickly return to 3.5+ with sitting back down. No visible JVD on him so suspect volume depletion. Will get 250cc IVF.    Continuous Infusions:  Scheduled Meds:   amiodarone  400 mg Oral Daily    amitriptyline  25 mg Oral Once per day on Monday Wednesday Friday    atorvastatin  40 mg Oral QHS    guaiFENesin  600 mg Oral BID    mupirocin   Nasal BID    potassium, sodium phosphates  2 packet Oral BID    pregabalin  100 mg Oral Every other day    sodium chloride 0.9%  250 mL Intravenous Once    venlafaxine  37.5 mg Oral Daily    [START ON 4/4/2025] warfarin  1.25 mg Oral Every Mon, Wed, Fri    warfarin  2.5 mg Oral Every Tues, Thurs, Sat, Sun     PRN Meds:  Current Facility-Administered Medications:     dextrose 50%, 12.5 g, Intravenous, PRN    dextrose 50%, 25 g, Intravenous, PRN    glucagon (human recombinant), 1 mg, Intramuscular, PRN    glucose, 16 g, Oral, PRN    glucose, 24 g, Oral, PRN    insulin aspart U-100, 0-5 Units, Subcutaneous, QID (AC + HS) PRN    Review of patient's allergies indicates:  No Known Allergies  Objective:     Vital Signs (Most Recent):  Temp: 98.8 °F (37.1 °C) (04/03/25 1112)  Pulse: 83 (04/03/25 1112)  Resp: 18 (04/03/25 0730)  BP: (!) 75/55 (04/03/25 1112)  SpO2: 98 % (04/03/25 0730) Vital Signs (24h Range):  Temp:  [98.1 °F (36.7 °C)-98.9 °F (37.2 °C)] 98.8 °F (37.1 °C)  Pulse:  [] 83  Resp:  [13-22] 18  SpO2:  [95 %-100 %] 98 %  BP: ()/(0-82) 75/55     Patient Vitals for the past 72 hrs (Last 3 readings):   Weight   04/02/25 2115 80.8 kg (178 lb 2.1 oz)     Body mass index is 23.5 kg/m².      Intake/Output Summary (Last 24 hours) at 4/3/2025 1237  Last data filed at 4/3/2025 0906  Gross per 24 hour   Intake 718 ml   Output 100 ml   Net 618 ml            Physical Exam  Constitutional:       Appearance:  Normal appearance.   Neck:      Comments: JVP not elevated  Cardiovascular:      Rate and Rhythm: Normal rate and regular rhythm.      Pulses: Normal pulses.      Heart sounds: Normal heart sounds.      Comments: LVAD hum  Pulmonary:      Effort: Pulmonary effort is normal. No respiratory distress.      Breath sounds: Normal breath sounds. No wheezing or rales.   Abdominal:      General: Abdomen is flat. There is no distension.      Palpations: Abdomen is soft.      Tenderness: There is no abdominal tenderness.   Musculoskeletal:      Cervical back: Normal range of motion and neck supple.      Right lower leg: No edema.      Left lower leg: No edema.   Skin:     General: Skin is warm and dry.   Neurological:      General: No focal deficit present.      Mental Status: He is alert and oriented to person, place, and time.   Psychiatric:         Mood and Affect: Mood normal.         Behavior: Behavior normal.            Significant Labs:  CBC:  Recent Labs   Lab 04/01/25 0910 04/02/25 1402 04/03/25  0321   WBC 6.38 5.53 5.02   RBC 3.61* 3.63* 3.39*   HGB 9.2* 9.4* 8.6*   HCT 31.5* 31.3* 29.2*    238 196   MCV 87 86 86   MCH 25.5* 25.9* 25.4*   MCHC 29.2* 30.0* 29.5*     BNP:  Recent Labs   Lab 04/01/25  0910 04/02/25  1436   * 559*     CMP:  Recent Labs   Lab 04/01/25 0910 04/02/25 1402 04/03/25  0321   CALCIUM 8.5* 8.6* 8.4*   ALBUMIN 2.5* 2.5*  --     137 136   K 5.1 4.2 4.2   CO2 23 25 22*    102 106   BUN 35* 20 23   CREATININE 6.5* 4.5* 5.2*   ALKPHOS 105 105  --    ALT 25 27  --    AST 22 25  --    BILITOT 0.5 0.4  --       Coagulation:   Recent Labs   Lab 04/01/25 0910 04/02/25 1402 04/03/25  0321   INR 2.9* 2.8* 3.3*   APTT  --  46.4* 47.7*     LDH:  Recent Labs   Lab 04/03/25  0321   *     Microbiology:  Microbiology Results (last 7 days)       Procedure Component Value Units Date/Time    Blood culture [[id number]] Collected: 04/03/25 1036    Order Status: Sent Specimen:  Blood Updated: 04/03/25 1155    Blood culture [[id number]] Collected: 04/03/25 1041    Order Status: Sent Specimen: Blood Updated: 04/03/25 1151            I have reviewed all pertinent labs within the past 24 hours.    Estimated Creatinine Clearance: 16.6 mL/min (A) (based on SCr of 5.2 mg/dL (H)).    Diagnostic Results:  I have reviewed all pertinent imaging results/findings within the past 24 hours.

## 2025-04-03 NOTE — ASSESSMENT & PLAN NOTE
Procedure: Device Interrogation Including analysis of device parameters  Current Settings: Ventricular Assist Device  INR supratherapeutic  Review of device function is stable      4/3/2025    10:55 AM 4/3/2025    10:54 AM 4/3/2025    10:51 AM 4/3/2025    10:50 AM 4/3/2025     9:09 AM 4/3/2025     5:23 AM 4/3/2025    12:00 AM   TXP LVAD INTERROGATIONS   Type HeartMate3 HeartMate3 HeartMate3 HeartMate3 HeartMate3 HeartMate3 HeartMate3   Flow 2.4  3.3 2.3  4.1 2.4 2.8 3   Speed     5000 5000 5000   PI     6 8.5 8.8   Power ([place])     3.4 3.6 3.5   LSL      4600 4600   Pulsatility      Pulse Pulse       Significant value

## 2025-04-03 NOTE — PLAN OF CARE
Recommendations     1. Continue cardiac diet as tolerated     - please continue to document PO % intake via flowsheets     2. Recommend novasource strawberry BID   3. Encourage good intake    4. RD to monitor weight, labs, intake     Goals:   1. % nutritional needs met with diet during admission     2. Maintain weight during admission  Nutrition Goal Status: new  Communication of RD Recs: other (comment) (POC)     Nutrition Discharge Planning      Nutrition Discharge Planning: Therapeutic diet (comments), Oral supplement regimen (comments)  Therapeutic diet (comments): diabetic, cardiac, renal diet  Oral supplement regimen (comments): novasource or nepro

## 2025-04-03 NOTE — PROGRESS NOTES
[facility] - Cardiac Intensive Care  Cardiothoracic Surgery  Evaluation and Management/VAD interrogation       Patient Name: Radha Abbott  MRN: [id number]  Admission Date: 4/2/2025  Hospital Length of Stay: 1 days  Code Status: Full Code   Attending Physician: Brayan Ocampo MD   Referring Provider: Christa Manning DO  Principal Problem:<principal problem not specified>            Subjective:     Post-Op Info:  * No surgery found *         Subjective:     Post-Op Info:  * No surgery found *              Interval History: Admitted yesterday due to syncopal episode follwing HD. LF dropped to 2.3 while working with therapy, flows quickly return to 3.5+ with sitting back down. 250cc IVF for suspected hypovolemia.     Medications:  Continuous Infusions:  Scheduled Meds:   amiodarone  400 mg Oral Daily    amitriptyline  25 mg Oral Once per day on Monday Wednesday Friday    atorvastatin  40 mg Oral QHS    guaiFENesin  600 mg Oral BID    mupirocin   Nasal BID    potassium, sodium phosphates  2 packet Oral BID    pregabalin  100 mg Oral Every other day    sodium chloride 0.9%  250 mL Intravenous Once    venlafaxine  37.5 mg Oral Daily    [START ON 4/4/2025] warfarin  1.25 mg Oral Every Mon, Wed, Fri    warfarin  2.5 mg Oral Every Tues, Thurs, Sat, Sun     PRN Meds:  Current Facility-Administered Medications:     dextrose 50%, 12.5 g, Intravenous, PRN    dextrose 50%, 25 g, Intravenous, PRN    glucagon (human recombinant), 1 mg, Intramuscular, PRN    glucose, 16 g, Oral, PRN    glucose, 24 g, Oral, PRN    insulin aspart U-100, 0-5 Units, Subcutaneous, QID (AC + HS) PRN     Objective:     Vital Signs (Most Recent):  Temp: 98.8 °F (37.1 °C) (04/03/25 1112)  Pulse: (!) 55 (04/03/25 1238)  Resp: 18 (04/03/25 0730)  BP: (!) 75/55 (04/03/25 1112)  SpO2: 98 % (04/03/25 0730) Vital Signs (24h Range):  Temp:  [98.1 °F (36.7 °C)-98.8 °F (37.1 °C)] 98.8 °F (37.1 °C)  Pulse:  [] 55  Resp:  [13-22] 18  SpO2:  [95 %-100 %] 98 %  BP:  ()/(0-82) 75/55       Intake/Output Summary (Last 24 hours) at 4/3/2025 1356  Last data filed at 4/3/2025 0906  Gross per 24 hour   Intake 718 ml   Output 100 ml   Net 618 ml       Physical Exam  Constitutional:       Appearance: Normal appearance.   HENT:      Head: Normocephalic.   Eyes:      Extraocular Movements: Extraocular movements intact.   Cardiovascular:      Rate and Rhythm: Normal rate and regular rhythm.      Comments: LVAD hum is smooth  Pulmonary:      Effort: Pulmonary effort is normal.      Breath sounds: Normal breath sounds.   Abdominal:      General: Abdomen is flat.      Palpations: Abdomen is soft.   Skin:     General: Skin is warm and dry.   Neurological:      General: No focal deficit present.      Mental Status: He is alert.         Significant Labs:  CBC:   Recent Labs   Lab 04/03/25  0321   WBC 5.02   RBC 3.39*   HGB 8.6*   HCT 29.2*      MCV 86   MCH 25.4*   MCHC 29.5*     CMP:   Recent Labs   Lab 04/02/25  1402 04/03/25  0321   CALCIUM 8.6* 8.4*   ALBUMIN 2.5*  --     136   K 4.2 4.2   CO2 25 22*    106   BUN 20 23   CREATININE 4.5* 5.2*   ALKPHOS 105  --    ALT 27  --    AST 25  --    BILITOT 0.4  --      Coagulation:   Recent Labs   Lab 04/03/25  0321   INR 3.3*   APTT 47.7*     LFTs:   Recent Labs   Lab 04/02/25  1402   ALT 27   AST 25   ALKPHOS 105   BILITOT 0.4   ALBUMIN 2.5*       Significant Diagnostics:  I have reviewed all pertinent imaging results/findings within the past 24 hours.    Procedure: Device Interrogation Including analysis of device parameters  Current Settings: Ventricular Assist Device  Review of device function is unstable      4/3/2025     1:39 PM 4/3/2025    10:55 AM 4/3/2025    10:54 AM 4/3/2025    10:51 AM 4/3/2025    10:50 AM 4/3/2025     9:09 AM 4/3/2025     5:23 AM   TXP LVAD INTERROGATIONS   Type HeartMate3 HeartMate3 HeartMate3 HeartMate3 HeartMate3 HeartMate3 HeartMate3   Flow 3.8 2.4  3.3 2.3  4.1 2.4 2.8   Speed 5000     0362  5000   PI 5.6     6 8.5   Power ([place]) 3.4     3.4 3.6   LSL       4600   Pulsatility       Pulse       Significant value       Assessment/Plan:     LVAD (left ventricular assist device) present  - Admitted for syncopal episode follwing HD.   - Implant Date: 12/4/2023  - Speed: 5000  - Low Flow Events: flows dropped to 2.4 at 10 am while working with therapy, improved when sat back down.   - Interval History was obtained from team member during rounding today.  - VAD/MAKAYLA teaching was not performed with patient.  - Mobilization/Physical Therapy is ongoing.  - Anticoagulation: Coumadin   - INR Goal: 3.3  - BUN: 23  - Creat: 5.2  - LDH: 352  - suspect LFA due to hypovolemia, IVF given.     More than 50 percent of the care dominated counseling and coordinating care with different team members. The VAD was interrogated. All these findings are documented in the note above.         Mehnaz Garcia PA-C  Cardiothoracic Surgery  [facility] - Cardiac Intensive Care

## 2025-04-03 NOTE — PT/OT/SLP EVAL
Occupational Therapy  Co Evaluation    Name: Radha Abbott  MRN: [id number]  Admitting Diagnosis: ESRD   LVAD placed 12/1/23.   Pt fell out of his truck following HD and had LFA on LVAD  Pt was recently in hospital in early March due to falls.     Recommendations:     Discharge Recommendations: Low Intensity Therapy      Assessment:     Radha Abbott is a 64 y.o. male . Performance deficits affecting function: weakness, impaired endurance, impaired self care skills, impaired functional mobility, gait instability, impaired balance.    Pt tolerated session fair as he was unable to progress with activity due to symptomatic Low Flow Alarm with stand   Rehab Prognosis: Good; patient would benefit from acute skilled OT services to address these deficits and reach maximum level of function.       Plan:     Patient to be seen 3 x/week to address the above listed problems via self-care/home management, therapeutic activities, therapeutic exercises  Plan of Care Expires:    Plan of Care Reviewed with: patient, spouse    Subjective     Pt reports dizziness in stand       Occupational Profile:  Pt lives with spouse in one story home with no JACKIE. When spouse is working, pt's sister is with the pt at home.   Typically, pt is independent but has had recent falls and hospital admissions with falls/syncope.   Pt with RW, rollator, BSC and builtin shower shower chair in walk in shower.   Spouse just bought upright walker for home use, but it has not yet been delivered.     Pain/Comfort:  Pain Rating 1: 9/10  Location - Orientation 1: lower  Location 1: back  Pain Addressed 1: Reposition, Distraction    Patients cultural, spiritual, Quaker conflicts given the current situation:      Objective:     Communicated with: nsg prior to session.  Patient found in chair with spouse in room.   Coeval completed this date to optimize functional performance and safety.   LVAD to wall power and tele in place,   General Precautions: Standard, fall,  LVAD     Occupational Performance:    Functional Mobility/Transfers:  CGA sit<>stand from chair x 2 trials.     Activities of Daily Living:  LE dressing: pt donned footwear with set-up chair level.       Cognitive/Visual Perceptual:  Pt awake, alert and following commands.     Physical Exam:  Pt demo WFL UE strength/ROM, coordination and sensation     AMPAC 6 Click ADL:  AMPAC Total Score: 19    Treatment & Education:  Pt completed first stand from chair with CGA. Pt in stand with onset of dizziness within 30 seconds and had LFA. Pt returned sitting and symptoms resolved. One additional stand completed once LVAD flow returned to baseline, and had same response.   Medical team and nsg present for second standing trial and both aware of Low Flow Alarm     LVAD flow dropped from 4.1 to 2.1 on first stand with symptoms of dizziness; audible LFA  LVAD flow dropped from 3.4 to 2.3 on second stand with sympots of dizziness and no LFA.   Each stand lasting approx 30 seconds before returning to sitting in chair. B UE support on RW.         Pt  transitioned LVAD from wall power to battery power and placed items in his vest in prep for mobility with set-up.  Following session, OT assisted pt to return LVAD to wall power.     Education provided re: role of OT and importance of having staff for all mobility.       Patient left up in chair with call button in reach and nsg and spouse present    GOALS:       Goals to be reassessed or met in 7 days 4/10/25  Pt to demo supervision for basic t/f skills including bed, chair and commode.   Pt to complete standing g/h skills with supervision.   Pt to complete UE dressing with set-up  Pt to complete LE dressing with supervision.   Pt to complete toileting with supervision.     History:     Past Medical History:   Diagnosis Date    Aspiration pneumonia of both lungs 06/17/2024    CAD (coronary artery disease)     CHF (congestive heart failure)     Delirium 06/16/2024    Diabetes mellitus      HFrEF (heart failure with reduced ejection fraction)     Hypoglycemia 06/12/2024    ICD (implantable cardioverter-defibrillator) in place     LVAD (left ventricular assist device) present 12/01/2023    MI, old     PAF (paroxysmal atrial fibrillation) 10/11/2023    Stage 4 chronic kidney disease 02/15/2024    Type 2 diabetes mellitus without complication, without long-term current use of insulin 10/07/2023         Past Surgical History:   Procedure Laterality Date    ANGIOPLASTY-VENOUS ARTERY Right 12/1/2023    Procedure: ANGIOPLASTY-VENOUS ARTERY, RIGHT FEMORAL;  Surgeon: Yuri Washington MD;  [facility];  Service: Cardiovascular;  Laterality: Right;    AORTIC VALVULOPLASTY N/A 12/1/2023    Procedure: REPAIR, AORTIC VALVE;  Surgeon: Yuri Washington MD;  [facility];  Service: Cardiovascular;  Laterality: N/A;    CARDIAC SURGERY      CLOSURE N/A 12/1/2023    Procedure: CLOSURE, TEMPORARY;  Surgeon: Yuri Washington MD;  [facility];  Service: Cardiovascular;  Laterality: N/A;    DRAINAGE OF PLEURAL EFFUSION  12/4/2023    Procedure: DRAINAGE, PLEURAL EFFUSION;  Surgeon: Yuri Washington MD;  [facility];  Service: Cardiovascular;;    INSERTION OF GRAFT TO PERICARDIUM  12/4/2023    Procedure: INSERTION, GRAFT, PERICARDIUM;  Surgeon: Yuri Washington MD;  [facility];  Service: Cardiovascular;;    INSERTION OF INTRA-AORTIC BALLOON ASSIST DEVICE Right 11/21/2023    Procedure: INSERTION, INTRA-AORTIC BALLOON PUMP;  Surgeon: Finn Cohn MD;  Location: [facility] CATH LAB;  Service: Cardiology;  Laterality: Right;    LEFT VENTRICULAR ASSIST DEVICE Left 12/1/2023    Procedure: INSERTION-LEFT VENTRICULAR ASSIST DEVICE;  Surgeon: Yuri Washington MD;  [facility];  Service: Cardiovascular;  Laterality: Left;  REDO STERNOTOMY - REDO SAW NEEDED FOR CASE    LYSIS OF ADHESIONS  12/1/2023    Procedure: LYSIS, ADHESIONS;  Surgeon: Yuri Washington  MD;  [address]  Service: Cardiovascular;;    PLACEMENT OF SWAN ROLANDO CATHETER WITH IMAGING GUIDANCE  11/20/2023    Procedure: INSERTION, CATHETER, SWAN-ROLANDO, WITH IMAGING GUIDANCE;  Surgeon: Sajan Hurley MD;  Location: [facility] CATH LAB;  Service: Cardiology;;    REMOVAL OF TUNNELED CENTRAL VENOUS CATHETER (CVC) N/A 3/1/2024    Procedure: REMOVAL, CATHETER, CENTRAL VENOUS, TUNNELED;  Surgeon: Seble Aguilar MD;  Location: [facility] CATH LAB;  Service: Interventional Nephrology;  Laterality: N/A;    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 12/1/2023    Procedure: REPAIR, ANEURYSM, ARTERY, FEMORAL;  Surgeon: Yuri Washington MD;  [address]  Service: Cardiovascular;  Laterality: Right;  Right Femoral Artery Repair    RIGHT HEART CATHETERIZATION Right 10/10/2023    Procedure: INSERTION, CATHETER, RIGHT HEART;  Surgeon: Bin Gandhi MD;  Location: [facility] CATH LAB;  Service: Cardiology;  Laterality: Right;    RIGHT HEART CATHETERIZATION Right 10/13/2023    Procedure: INSERTION, CATHETER, RIGHT HEART;  Surgeon: Walter Mcintyre MD;  Location: [facility] CATH LAB;  Service: Cardiology;  Laterality: Right;    RIGHT HEART CATHETERIZATION  11/13/2023    RIGHT HEART CATHETERIZATION Right 11/13/2023    Procedure: INSERTION, CATHETER, RIGHT HEART;  Surgeon: Juventino Bermudez Jr., MD;  Location: [facility] CATH LAB;  Service: Cardiology;  Laterality: Right;    RIGHT HEART CATHETERIZATION Right 11/20/2023    Procedure: INSERTION, CATHETER, RIGHT HEART;  Surgeon: Sajan Hurley MD;  Location: [facility] CATH LAB;  Service: Cardiology;  Laterality: Right;    RIGHT HEART CATHETERIZATION Right 1/22/2024    Procedure: INSERTION, CATHETER, RIGHT HEART;  Surgeon: Brayan Ocampo MD;  Location: [facility] CATH LAB;  Service: Cardiology;  Laterality: Right;    STERNAL WOUND CLOSURE N/A 12/4/2023    Procedure: CLOSURE, WOUND, STERNUM;  Surgeon: Yuri Washington MD;  [facility];  Service: Cardiovascular;   Laterality: N/A;    STERNOTOMY N/A 12/1/2023    Procedure: STERNOTOMY, REDO;  Surgeon: Yuri Washington MD;  Location: [facility] OR [address] [place] [address];  Service: Cardiovascular;  Laterality: N/A;    VALVULOPLASTY, MITRAL VALVE N/A 12/1/2023    Procedure: VALVULOPLASTY, MITRAL VALVE;  Surgeon: Yuri Washington MD;  Location: [facility] OR [address] [place] [address];  Service: Cardiovascular;  Laterality: N/A;       Time Tracking:     OT Date of Treatment: 04/03/25  OT Start Time: 1029  OT Stop Time: 1055  OT Total Time (min): 26 min    Billable Minutes:Evaluation 15  Therapeutic Activity 11    4/3/2025

## 2025-04-03 NOTE — PROGRESS NOTES
Discussion with wife, Arlyn 4/1/25 per VAD team:    Reports that patient is feeling better today, not weak like yesterday or the day before. States he had some diarrhea yesterday that she gave him pepto bismol for, advised that this should not be given to VAD patients due to increased risk of bleeding. States he also had a few small drops of bright red blood in urine this am, nothing since then, no other signs of bleeding. Advised to page VAD Coordinator on call if patient begins to feel weak again or has any signs of bleeding, verbalized understanding.     Pt admitted overnight due to fall and LFA.  While talking with pt and wife this am, they explained HD removed 2.5 L yesterday and usually remove <2L.  I asked Arlyn to make sure the HD unit is aware of his weakness, LFA and fall after HD yesterday.  No other alarms noted in history other than that 1 LFA.  Pt reports he made it to one outpatient rehab session and hopes to continue to get stronger.  No needs from me at this time.

## 2025-04-03 NOTE — PROGRESS NOTES
Admit Note      Met with patient and spouse to assess patients needs. Patient is a 64 y.o.  male admitted for Syncope. Pt received LVAD in 2023. Pt's spouse does pt's dressing changes.    Patient admitted on 4/2/2025. At this time, pt was asleep and unable to arouse. Pt's spouse completed assessment. At this time, patients caregiver presents as alert and oriented x 4, pleasant, communicative, and asking and answering questions appropriately.      Household/Family Systems (as reported by patients caregiver)     Patient resides with his spouse. Support system includes pt's spouse and sister Teetee. Pt has 4 adult children from a previous relationship that resides in [place], [place] (20 mins away from pt). Pt's spouse also has an adult son from a previous relationship that resides in [place], [place]. Patient does not have dependents that are need of being cared for.    Pt's home address: [address] [place] [address].                                  [place] [address]    Pt's phone number: [phone]     Patients primary caregiver is spouse. Pt's spouse is employed full-time at [employer] as a Pt Access Rep. Confirmed patients emergency contacts information as follow:     Arlyn Abbott, spouse, [phone]/M or [phone]/W  Teetee Zhou, sister, [phone] (retired and assist pt while spouse is at work)    During admission, patient's caregiver plans to stay in patient's room. Confirmed patient and patients caregivers do have access to reliable transportation.    Cognitive Status/Learning     Patients caregiver reports patients reading ability as 12th grade and states patient does not have difficulty with reading, writing, seeing, hearing, comprehension, learning, and memory. Pt wears bifocal eyeglasses. Patients caregiver reports patient learns best by multiple methods (audio, visual, and hands-on).   Needed: No.   Highest education level: High School (9-12) or GED    Vocation/Disability (as  reported by patients caregiver)    Working for Income: No  If no, reason not working: Disability  Pt is disabled due to HF since 2022. Prior to disability, pt was a self-employed .    Adherence     Patients caregiver reports patient has a medium level of adherence to his health care regimen. Adherence counseling and education provided. Patient's caregiver verbalizes understanding.    Substance Use    Patients caregiver reports patients substance usage as the following:    Tobacco:  Pt's spouse denies current use. Pt quit smoking cigarettes in 2022.  Alcohol:  Pt drinks socially. Per pt's spouse, pt last use 3/25/25.  Illicit Drugs/Non-prescribed Medications: none, patient denies any use.  Patients caregiver states clear understanding of the potential impact of substance use.  Substance abstinence/cessation counseling, education and resources provided and reviewed.     Services Utilizing/ADLS (as reported by patients caregiver)    Infusion Service: Prior to admission, patient utilizing? no Utilized [facility] ([phone], [phone]) in the past   Home Health: Prior to admission, patient utilizing? no Utilized [facility] ([phone]) in the past   DME: Prior to admission, yes Pt has a RW, Rollator, PRW, WC, and BSC at home.  Pulmonary/Cardiac Rehab: Prior to admission, no  Dialysis:  Prior to admission, yes OP HD at [facility] on MWF at 1st shift (6:30 am)  Transplant Specialty Pharmacy:  Prior to admission, no.    PTA, pt was [facility] and completed PT eval on 3/25/25. Pt's spouse was amenable to resuming OP PT and OT.    [facility]             [phone]  [facility] [phone]/F    Prior to admission, patients caregiver reports patient was not independent with ADLS and was not driving. Pt was utilizing his WC pta due to weakness. Pt had a fall on 4/1/25 after OP HD. Pt's spouse stated that pt's sister travels with pt after  HD, and pt is fatigue after HD and needs to go straight home. Pt's spouse reports she will discuss this concern with pt's sister. Patients caregiver reports patient is not able to care for self at this time due to compromised medical condition (as documented in medical record) and physical weakness.Patients caregiver reports patient indicates a willingness to care for self once medically cleared to do so.    Insurance/Medications    Insured by   Payer/Plan Subscr  Sex Relation Sub. Ins. ID Effective Group Num   1. MEDICAID - [place] BUCK 1960 Male Self [id number]                                   [address] [address]   [facility] 1960 [facility] Self [id number] 10/9/23                                    PO       Primary Insurance (for UNOS reporting): Public Insurance - Medicaid  Secondary Insurance (for UNOS reporting): None    Patients caregiver reports patient is able to obtain and afford medications at this time and at time of discharge.    Living Will/Healthcare Power of     Patients caregiver reports patient does not have a LW and/or HCPA.   provided education regarding LW and HCPA and the completion of forms.    Coping/Mental Health (as reported by patients caregiver)    SW was unable to assess pt's coping status because pt was asleep during assessment. Pt's spouse reports that pt has a h/o depression. Patients caregiver is coping well.      Discharge Planning (as reported by patients caregiver)    At time of discharge, patient plans to return to his home under the care of spouse and sister Teetee. Patients spouse will transport patient. Per rounds today, expected discharge date is 25 . Patients caretaker verbalizes understanding and is involved in treatment planning and discharge process.    Additional Concerns    Patient's caretaker denies additional needs and/or concerns at this time. Patient is being followed for needs,  education, resources, information, emotional support, supportive counseling, and for supportive and skilled discharge plan of care.  providing ongoing psychosocial support, education, resources and d/c planning as needed.  SW remains available. Patient's caregiver verbalizes understanding and agreement with information reviewed,  availability and how to access available resources as needed.

## 2025-04-03 NOTE — HPI
"63 y/o AAM w/ PMHx of stage D HFrEF 2/2 ICM s/p HM3 as DT (12/1/2023), ESRD on HD (MWF), and debility who presented to hospital as transfer from ED in [place] for syncope. He had a recent hospitalization at [facility] from 3/10-3/13/25 for falls. Workup during hospitalization was negative for ny acute processes and he was discharged home with outpatient PT.  Per wife, since he has been home he has continued to feel weak and nauseous. Otherwise, no worsening sob/huerta, palpitations, cp/chest discomfort. Despite this, she reports he has had good PO intake. Has started megestrol for appetite. He reports he has been feeling very fatigued and weak after HD sessions. This AM after he had a HD session, his sister drove him home. As he was getting out of the car he felt more "weak" and syncopized. He does not have any recollection of the event however his sister ( and wife who was on the phone) report him losing consciousness. LVAD low flow alarm was heard. They then took him to the nearest ED who then transferred him to [facility] for higher level of care. Labs without any signficant changes since prior. LVAD interrogation with 1 low flow alarm at 10:46 AM with flow of 2.2. Admitted to [facility]. Nephrology consulted for ESRD.    HPI obtained from EMR review and patient interview.   "

## 2025-04-03 NOTE — ASSESSMENT & PLAN NOTE
2/2 iCM  S/p Heartmate III placed on 12/01/2023   On warfarin and ASA   Euvolemic/possibly hypovolemic on examination today

## 2025-04-03 NOTE — PLAN OF CARE
Problem: Occupational Therapy  Goal: Occupational Therapy Goal  Description: Goals to be reassessed or met in 7 days 4/10/25  Pt to demo supervision for basic t/f skills including bed, chair and commode.   Pt to complete standing g/h skills with supervision.   Pt to complete UE dressing with set-up  Pt to complete LE dressing with supervision.   Pt to complete toileting with supervision.     Outcome: Progressing

## 2025-04-03 NOTE — ASSESSMENT & PLAN NOTE
2/2 iCM  S/p Heartmate III placed on 12/01/2023   On warfarin and ASA   Euvolemic on examination today (s/p HD this AM)

## 2025-04-03 NOTE — PROGRESS NOTES
04/03/25 1050 04/03/25 1051   Device   Type HeartMate3 HeartMate3   Flow 4.1 (S)  2.3     Low Flow Alarmed.     04/03/25 1054 04/03/25 1055   Device   Type HeartMate3 HeartMate3   Flow 3.3 (S)  2.4   No Low Flow Alarm.     04/03/25 1055   Vital Signs   BP (!) 72/0   BP Location Left arm   BP Method Doppler     During therapy patient had 2 episodes of Low Flows as stated above. Patient became dizzy and lightheaded upon standing from sitting position with therapy. DENIZ Ahn at bedside and will adjust orders to allow more fluid intake. LFA did not show in history on VAD monitor. Alva, VAD coordinator notified of event.

## 2025-04-03 NOTE — SUBJECTIVE & OBJECTIVE
Subjective:     Post-Op Info:  * No surgery found *              Interval History: Admitted yesterday due to syncopal episode follwing HD. LF dropped to 2.3 while working with therapy, flows quickly return to 3.5+ with sitting back down. 250cc IVF for suspected hypovolemia.     Medications:  Continuous Infusions:  Scheduled Meds:   amiodarone  400 mg Oral Daily    amitriptyline  25 mg Oral Once per day on Monday Wednesday Friday    atorvastatin  40 mg Oral QHS    guaiFENesin  600 mg Oral BID    mupirocin   Nasal BID    potassium, sodium phosphates  2 packet Oral BID    pregabalin  100 mg Oral Every other day    sodium chloride 0.9%  250 mL Intravenous Once    venlafaxine  37.5 mg Oral Daily    [START ON 4/4/2025] warfarin  1.25 mg Oral Every Mon, Wed, Fri    warfarin  2.5 mg Oral Every Tues, Thurs, Sat, Sun     PRN Meds:  Current Facility-Administered Medications:     dextrose 50%, 12.5 g, Intravenous, PRN    dextrose 50%, 25 g, Intravenous, PRN    glucagon (human recombinant), 1 mg, Intramuscular, PRN    glucose, 16 g, Oral, PRN    glucose, 24 g, Oral, PRN    insulin aspart U-100, 0-5 Units, Subcutaneous, QID (AC + HS) PRN     Objective:     Vital Signs (Most Recent):  Temp: 98.8 °F (37.1 °C) (04/03/25 1112)  Pulse: (!) 55 (04/03/25 1238)  Resp: 18 (04/03/25 0730)  BP: (!) 75/55 (04/03/25 1112)  SpO2: 98 % (04/03/25 0730) Vital Signs (24h Range):  Temp:  [98.1 °F (36.7 °C)-98.8 °F (37.1 °C)] 98.8 °F (37.1 °C)  Pulse:  [] 55  Resp:  [13-22] 18  SpO2:  [95 %-100 %] 98 %  BP: ()/(0-82) 75/55       Intake/Output Summary (Last 24 hours) at 4/3/2025 1356  Last data filed at 4/3/2025 0906  Gross per 24 hour   Intake 718 ml   Output 100 ml   Net 618 ml       Physical Exam  Constitutional:       Appearance: Normal appearance.   HENT:      Head: Normocephalic.   Eyes:      Extraocular Movements: Extraocular movements intact.   Cardiovascular:      Rate and Rhythm: Normal rate and regular rhythm.      Comments:  LVAD hum is smooth  Pulmonary:      Effort: Pulmonary effort is normal.      Breath sounds: Normal breath sounds.   Abdominal:      General: Abdomen is flat.      Palpations: Abdomen is soft.   Skin:     General: Skin is warm and dry.   Neurological:      General: No focal deficit present.      Mental Status: He is alert.         Significant Labs:  CBC:   Recent Labs   Lab 04/03/25  0321   WBC 5.02   RBC 3.39*   HGB 8.6*   HCT 29.2*      MCV 86   MCH 25.4*   MCHC 29.5*     CMP:   Recent Labs   Lab 04/02/25  1402 04/03/25  0321   CALCIUM 8.6* 8.4*   ALBUMIN 2.5*  --     136   K 4.2 4.2   CO2 25 22*    106   BUN 20 23   CREATININE 4.5* 5.2*   ALKPHOS 105  --    ALT 27  --    AST 25  --    BILITOT 0.4  --      Coagulation:   Recent Labs   Lab 04/03/25  0321   INR 3.3*   APTT 47.7*     LFTs:   Recent Labs   Lab 04/02/25  1402   ALT 27   AST 25   ALKPHOS 105   BILITOT 0.4   ALBUMIN 2.5*       Significant Diagnostics:  I have reviewed all pertinent imaging results/findings within the past 24 hours.    Procedure: Device Interrogation Including analysis of device parameters  Current Settings: Ventricular Assist Device  Review of device function is unstable      4/3/2025     1:39 PM 4/3/2025    10:55 AM 4/3/2025    10:54 AM 4/3/2025    10:51 AM 4/3/2025    10:50 AM 4/3/2025     9:09 AM 4/3/2025     5:23 AM   TXP LVAD INTERROGATIONS   Type HeartMate3 HeartMate3 HeartMate3 HeartMate3 HeartMate3 HeartMate3 HeartMate3   Flow 3.8 2.4  3.3 2.3  4.1 2.4 2.8   Speed 5000     5000 5000   PI 5.6     6 8.5   Power ([place]) 3.4     3.4 3.6   LSL       4600   Pulsatility       Pulse       Significant value

## 2025-04-03 NOTE — CONSULTS
"[facility] - Cardiac Intensive [facility]  Nephrology  Consult Note    Patient Name: Radha Abbott  MRN: [id number]  Admission Date: 4/2/2025  Hospital Length of Stay: 1 days  Attending Provider: Brayan Ocampo MD   Primary Care Physician: Vasu Kong MD  Principal Problem:<principal problem not specified>    Inpatient consult to Nephrology  Consult performed by: Malina Rivas PA-C  Consult ordered by: Nabila Swan MD  Reason for consult: ESRD        Subjective:     HPI: 63 y/o AAM w/ PMHx of stage D HFrEF 2/2 ICM s/p HM3 as DT (12/1/2023), ESRD on HD (MWF), and debility who presented to hospital as transfer from ED in [place] for syncope. He had a recent hospitalization at [facility] from 3/10-3/13/25 for falls. Workup during hospitalization was negative for ny acute processes and he was discharged home with outpatient PT.  Per wife, since he has been home he has continued to feel weak and nauseous. Otherwise, no worsening sob/huerta, palpitations, cp/chest discomfort. Despite this, she reports he has had good PO intake. Has started megestrol for appetite. He reports he has been feeling very fatigued and weak after HD sessions. This AM after he had a HD session, his sister drove him home. As he was getting out of the car he felt more "weak" and syncopized. He does not have any recollection of the event however his sister ( and wife who was on the phone) report him losing consciousness. LVAD low flow alarm was heard. They then took him to the nearest ED who then transferred him to [facility] for higher level of care. Labs without any signficant changes since prior. LVAD interrogation with 1 low flow alarm at 10:46 AM with flow of 2.2. Admitted to [facility]. Nephrology consulted for ESRD.    HPI obtained from EMR review and patient interview.     Past Medical History:   Diagnosis Date    Aspiration pneumonia of both lungs 06/17/2024    CAD (coronary artery disease)     CHF (congestive heart failure)     Delirium 06/16/2024    " Diabetes mellitus     HFrEF (heart failure with reduced ejection fraction)     Hypoglycemia 06/12/2024    ICD (implantable cardioverter-defibrillator) in place     LVAD (left ventricular assist device) present 12/01/2023    MI, old     PAF (paroxysmal atrial fibrillation) 10/11/2023    Stage 4 chronic kidney disease 02/15/2024    Type 2 diabetes mellitus without complication, without long-term current use of insulin 10/07/2023       Past Surgical History:   Procedure Laterality Date    ANGIOPLASTY-VENOUS ARTERY Right 12/1/2023    Procedure: ANGIOPLASTY-VENOUS ARTERY, RIGHT FEMORAL;  Surgeon: Yuri Washington MD;  [facility];  Service: Cardiovascular;  Laterality: Right;    AORTIC VALVULOPLASTY N/A 12/1/2023    Procedure: REPAIR, AORTIC VALVE;  Surgeon: Yuri Washington MD;  [facility];  Service: Cardiovascular;  Laterality: N/A;    CARDIAC SURGERY      CLOSURE N/A 12/1/2023    Procedure: CLOSURE, TEMPORARY;  Surgeon: Yuri Washington MD;  [facility];  Service: Cardiovascular;  Laterality: N/A;    DRAINAGE OF PLEURAL EFFUSION  12/4/2023    Procedure: DRAINAGE, PLEURAL EFFUSION;  Surgeon: Yuri Washington MD;  [facility];  Service: Cardiovascular;;    INSERTION OF GRAFT TO PERICARDIUM  12/4/2023    Procedure: INSERTION, GRAFT, PERICARDIUM;  Surgeon: Yuri Washington MD;  [facility];  Service: Cardiovascular;;    INSERTION OF INTRA-AORTIC BALLOON ASSIST DEVICE Right 11/21/2023    Procedure: INSERTION, INTRA-AORTIC BALLOON PUMP;  Surgeon: Finn Cohn MD;  Location: [facility] CATH LAB;  Service: Cardiology;  Laterality: Right;    LEFT VENTRICULAR ASSIST DEVICE Left 12/1/2023    Procedure: INSERTION-LEFT VENTRICULAR ASSIST DEVICE;  Surgeon: Yuri Washington MD;  [facility];  Service: Cardiovascular;  Laterality: Left;  REDO STERNOTOMY - REDO SAW NEEDED FOR CASE    LYSIS OF ADHESIONS  12/1/2023    Procedure: LYSIS, ADHESIONS;  Surgeon:  Yuri Washington MD;  Location: [facility] OR [facility];  Service: Cardiovascular;;    PLACEMENT OF SWAN ROLANDO CATHETER WITH IMAGING GUIDANCE  11/20/2023    Procedure: INSERTION, CATHETER, SWAN-ROLANDO, WITH IMAGING GUIDANCE;  Surgeon: Sajan Hurley MD;  Location: [facility] CATH LAB;  Service: Cardiology;;    REMOVAL OF TUNNELED CENTRAL VENOUS CATHETER (CVC) N/A 3/1/2024    Procedure: REMOVAL, CATHETER, CENTRAL VENOUS, TUNNELED;  Surgeon: Seble Aguilar MD;  Location: [facility] CATH LAB;  Service: Interventional Nephrology;  Laterality: N/A;    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 12/1/2023    Procedure: REPAIR, ANEURYSM, ARTERY, FEMORAL;  Surgeon: Yuri Washington MD;  [address]  Service: Cardiovascular;  Laterality: Right;  Right Femoral Artery Repair    RIGHT HEART CATHETERIZATION Right 10/10/2023    Procedure: INSERTION, CATHETER, RIGHT HEART;  Surgeon: Bin Gandhi MD;  Location: [facility] CATH LAB;  Service: Cardiology;  Laterality: Right;    RIGHT HEART CATHETERIZATION Right 10/13/2023    Procedure: INSERTION, CATHETER, RIGHT HEART;  Surgeon: Walter Mcintyre MD;  Location: [facility] CATH LAB;  Service: Cardiology;  Laterality: Right;    RIGHT HEART CATHETERIZATION  11/13/2023    RIGHT HEART CATHETERIZATION Right 11/13/2023    Procedure: INSERTION, CATHETER, RIGHT HEART;  Surgeon: Juventino Bermudez Jr., MD;  Location: [facility] CATH LAB;  Service: Cardiology;  Laterality: Right;    RIGHT HEART CATHETERIZATION Right 11/20/2023    Procedure: INSERTION, CATHETER, RIGHT HEART;  Surgeon: Sajan Hurley MD;  Location: [facility] CATH LAB;  Service: Cardiology;  Laterality: Right;    RIGHT HEART CATHETERIZATION Right 1/22/2024    Procedure: INSERTION, CATHETER, RIGHT HEART;  Surgeon: Brayan Ocampo MD;  Location: [facility] CATH LAB;  Service: Cardiology;  Laterality: Right;    STERNAL WOUND CLOSURE N/A 12/4/2023    Procedure: CLOSURE, WOUND, STERNUM;  Surgeon: Yuri Washington MD;  Location: [facility];  Service:  Cardiovascular;  Laterality: N/A;    STERNOTOMY N/A 12/1/2023    Procedure: STERNOTOMY, REDO;  Surgeon: Yuri Washington MD;  Location: [facility] OR [address] [place] [address];  Service: Cardiovascular;  Laterality: N/A;    VALVULOPLASTY, MITRAL VALVE N/A 12/1/2023    Procedure: VALVULOPLASTY, MITRAL VALVE;  Surgeon: Yuri Washington MD;  Location: [facility] OR [address] [place] [address];  Service: Cardiovascular;  Laterality: N/A;       Review of patient's allergies indicates:  No Known Allergies  Current Facility-Administered Medications   Medication Frequency    amiodarone tablet 400 mg Daily    amitriptyline tablet 25 mg Once per day on Monday Wednesday Friday    atorvastatin tablet 40 mg QHS    dextrose 50% injection 12.5 g PRN    dextrose 50% injection 25 g PRN    glucagon (human recombinant) injection 1 mg PRN    glucose chewable tablet 16 g PRN    glucose chewable tablet 24 g PRN    guaiFENesin 12 hr tablet 600 mg BID    insulin aspart U-100 pen 0-5 Units QID (AC + HS) PRN    mupirocin 2 % ointment BID    potassium, sodium phosphates 280-160-250 mg packet 2 packet BID    pregabalin capsule 100 mg Every other day    sodium chloride 0.9% bolus 250 mL 250 mL Once    venlafaxine tablet 37.5 mg Daily    [START ON 4/4/2025] warfarin (COUMADIN) split tablet 1.25 mg Every Mon, Wed, Fri    warfarin (COUMADIN) tablet 2.5 mg Every Tues, Thurs, Sat, Sun     Family History    None       Tobacco Use    Smoking status: Former     Current packs/day: 0.50     Types: Cigarettes    Smokeless tobacco: Not on file   Substance and Sexual Activity    Alcohol use: Yes     Comment: rarely    Drug use: No    Sexual activity: Not Currently     Partners: Female     Review of Systems   Constitutional:  Positive for fatigue.   HENT:  Positive for congestion.    Respiratory:  Negative for shortness of breath.    Cardiovascular:  Negative for chest pain.   Gastrointestinal:  Positive for diarrhea. Negative for nausea and vomiting.   Neurological:  Positive for syncope.     Objective:      Vital Signs (Most Recent):  Temp: 98.8 °F (37.1 °C) (04/03/25 1112)  Pulse: 83 (04/03/25 1112)  Resp: 18 (04/03/25 0730)  BP: (!) 75/55 (04/03/25 1112)  SpO2: 98 % (04/03/25 0730) Vital Signs (24h Range):  Temp:  [98.1 °F (36.7 °C)-98.9 °F (37.2 °C)] 98.8 °F (37.1 °C)  Pulse:  [] 83  Resp:  [13-22] 18  SpO2:  [95 %-100 %] 98 %  BP: ()/(0-82) 75/55     Weight: 80.8 kg (178 lb 2.1 oz) (04/02/25 2115)  Body mass index is 23.5 kg/m².  Body surface area is 2.04 meters squared.    I/O last 3 completed shifts:  In: 360 [P.O.:360]  Out: 100 [Urine:100]     Physical Exam  Vitals and nursing note reviewed.   Constitutional:       General: He is not in acute distress.  HENT:      Head: Normocephalic.   Eyes:      Conjunctiva/sclera: Conjunctivae normal.   Cardiovascular:      Rate and Rhythm: Normal rate.      Comments: LVAD hum  Pulmonary:      Effort: Pulmonary effort is normal. No respiratory distress.      Breath sounds: No wheezing or rales.   Abdominal:      General: There is no distension.      Palpations: Abdomen is soft.   Musculoskeletal:      Right lower leg: No edema.      Left lower leg: No edema.   Skin:     General: Skin is warm and dry.      Coloration: Skin is not jaundiced.   Neurological:      General: No focal deficit present.      Mental Status: He is alert.          Significant Labs:  CBC:   Recent Labs   Lab 04/03/25  0321   WBC 5.02   RBC 3.39*   HGB 8.6*   HCT 29.2*      MCV 86   MCH 25.4*   MCHC 29.5*     CMP:   Recent Labs   Lab 04/02/25  1402 04/03/25  0321   CALCIUM 8.6* 8.4*   ALBUMIN 2.5*  --     136   K 4.2 4.2   CO2 25 22*    106   BUN 20 23   CREATININE 4.5* 5.2*   ALKPHOS 105  --    ALT 27  --    AST 25  --    BILITOT 0.4  --      All labs within the past 24 hours have been reviewed.    Assessment/Plan:     Cardiac/Vascular  LVAD (left ventricular assist device) present  -management per primary    Renal/  ESRD (end stage renal disease)  64 year old male hx  of ESRD on HD MWF. Last HD Friday 3/7/25. Nephrology consulted for management of ESRD.      Nephrology History  -Outpatient HD unit: [facility]  -Nephrologist: ?  -HD tx days: MWF  -HD prescription: 3 hrs, F160, 350/800  -Last HD tx: 4/2/25  -HD access: R CVC  -HD modality: iHD  -Residual urine: minimal  -EDW:  81 kg, likely needs to be adjusted up     Plan/Recommendations  ESRD on HD:  -No need for emergent RRT, electrolytes stable. Plan for HD tomorrow to resume MWF schedule/  -Patient with fatigue, syncopal episode after HD yesterday. LFA to LVAD during syncopal episode. This AM had LFA and dizziness when standing while working with PT. Reports he started taking megestrol last month for appetite stimulation and feels he may be gaining weight. EDW likely needs to be adjusted up, I did speak with his unit about this. Encourage PO intake today for hydration, adjust UF with HD tomorrow. Will follow up on echo as well.   -Will continue iHD thrice weekly unless emergent indication arises  -Strict I&Os  -Pre & post HD weight  Anemia in ESRD  -Hgb goal 10-11, hgb 8.6, will trend  -Transfuse for hgb < 7  Mineral Bone Disease in ESRD  -Phos low today, repletion ordered. Hold phos binders for now.   -Renal diet if not NPO      Other  Falls  -management per primary        Thank you for your consult. I will follow-up with patient. Please contact us if you have any additional questions.    Malina Rivas PA-C  Nephrology  [facility] - Card[facility] Int[facility]

## 2025-04-03 NOTE — PROGRESS NOTES
04/03/2025  [place]    Current provider:  Brayan Ocampo MD    Device interrogation:      4/3/2025     5:23 AM 4/3/2025    12:00 AM 4/2/2025     9:26 PM 3/20/2025     1:38 PM 3/12/2025     3:01 PM 3/12/2025    12:00 PM 3/12/2025     9:15 AM   TXP LVAD INTERROGATIONS   Type HeartMate3 HeartMate3 HeartMate3  HeartMate3 HeartMate3 HeartMate3   Flow 2.8 3 2.8  3.8 3.8 3.7   Speed 5000 5000 5000  5000 5000 5000   PI 8.5 8.8 9.2  4.3 4.6    Power ([place]) 3.6 3.5 3.5  3.7 3.6    LSL 4600 4600 4600  4600 4600    Pulsatility Pulse Pulse Pulse No Pulse             Rounded on Ledric Abbott to ensure all mechanical assist device settings (IABP or VAD) were appropriate and all parameters were within limits.  I was able to ensure all back up equipment was present, the staff had no issues, and the Perfusion Department daily rounding was complete.      For implantable VADs: Interrogation of Ventricular assist device was performed with analysis of device parameters and review of device function. I have personally reviewed the interrogation findings and agree with findings as stated.     In emergency, the nursing units have been notified to contact the perfusion department either by:  Calling [phone] from 630am to 4pm Mon thru Fri, utilizing the On-Call Finder functionality of Epic and searching for Perfusion, or by contacting the hospital  from 4pm to 630am and on weekends and asking to speak with the perfusionist on call.    9:30 AM

## 2025-04-03 NOTE — SUBJECTIVE & OBJECTIVE
Past Medical History:   Diagnosis Date    Aspiration pneumonia of both lungs 06/17/2024    CAD (coronary artery disease)     CHF (congestive heart failure)     Delirium 06/16/2024    Diabetes mellitus     HFrEF (heart failure with reduced ejection fraction)     Hypoglycemia 06/12/2024    ICD (implantable cardioverter-defibrillator) in place     LVAD (left ventricular assist device) present 12/01/2023    MI, old     PAF (paroxysmal atrial fibrillation) 10/11/2023    Stage 4 chronic kidney disease 02/15/2024    Type 2 diabetes mellitus without complication, without long-term current use of insulin 10/07/2023       Past Surgical History:   Procedure Laterality Date    ANGIOPLASTY-VENOUS ARTERY Right 12/1/2023    Procedure: ANGIOPLASTY-VENOUS ARTERY, RIGHT FEMORAL;  Surgeon: Yuri Washington MD;  [facility];  Service: Cardiovascular;  Laterality: Right;    AORTIC VALVULOPLASTY N/A 12/1/2023    Procedure: REPAIR, AORTIC VALVE;  Surgeon: Yuri Washington MD;  [facility];  Service: Cardiovascular;  Laterality: N/A;    CARDIAC SURGERY      CLOSURE N/A 12/1/2023    Procedure: CLOSURE, TEMPORARY;  Surgeon: Yuri Washington MD;  Location: [facility] OR [facility];  Service: Cardiovascular;  Laterality: N/A;    DRAINAGE OF PLEURAL EFFUSION  12/4/2023    Procedure: DRAINAGE, PLEURAL EFFUSION;  Surgeon: Yuri Washington MD;  Location: [facility] OR [facility];  Service: Cardiovascular;;    INSERTION OF GRAFT TO PERICARDIUM  12/4/2023    Procedure: INSERTION, GRAFT, PERICARDIUM;  Surgeon: Yuri Washington MD;  [facility];  Service: Cardiovascular;;    INSERTION OF INTRA-AORTIC BALLOON ASSIST DEVICE Right 11/21/2023    Procedure: INSERTION, INTRA-AORTIC BALLOON PUMP;  Surgeon: Finn Cohn MD;  Location: [facility] CATH LAB;  Service: Cardiology;  Laterality: Right;    LEFT VENTRICULAR ASSIST DEVICE Left 12/1/2023    Procedure: INSERTION-LEFT VENTRICULAR ASSIST DEVICE;  Surgeon: Yuri Washington MD;   Location: [address] FLR;  Service: Cardiovascular;  Laterality: Left;  REDO STERNOTOMY - REDO SAW NEEDED FOR CASE    LYSIS OF ADHESIONS  12/1/2023    Procedure: LYSIS, ADHESIONS;  Surgeon: Yuri Washington MD;  [address]  Service: Cardiovascular;;    PLACEMENT OF SWAN ROLANDO CATHETER WITH IMAGING GUIDANCE  11/20/2023    Procedure: INSERTION, CATHETER, SWAN-ROLANDO, WITH IMAGING GUIDANCE;  Surgeon: Sajan Hurley MD;  Location: [facility] CATH LAB;  Service: Cardiology;;    REMOVAL OF TUNNELED CENTRAL VENOUS CATHETER (CVC) N/A 3/1/2024    Procedure: REMOVAL, CATHETER, CENTRAL VENOUS, TUNNELED;  Surgeon: Seble Aguilar MD;  Location: [facility] CATH LAB;  Service: Interventional Nephrology;  Laterality: N/A;    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 12/1/2023    Procedure: REPAIR, ANEURYSM, ARTERY, FEMORAL;  Surgeon: Yuri Washington MD;  [address]  Service: Cardiovascular;  Laterality: Right;  Right Femoral Artery Repair    RIGHT HEART CATHETERIZATION Right 10/10/2023    Procedure: INSERTION, CATHETER, RIGHT HEART;  Surgeon: Bin Gandhi MD;  [facility] CATH LAB;  Service: Cardiology;  Laterality: Right;    RIGHT HEART CATHETERIZATION Right 10/13/2023    Procedure: INSERTION, CATHETER, RIGHT HEART;  Surgeon: Walter Mcintyre MD;  Location: [facility] CATH LAB;  Service: Cardiology;  Laterality: Right;    RIGHT HEART CATHETERIZATION  11/13/2023    RIGHT HEART CATHETERIZATION Right 11/13/2023    Procedure: INSERTION, CATHETER, RIGHT HEART;  Surgeon: Juventino Bermudez Jr., MD;  Location: [facility] CATH LAB;  Service: Cardiology;  Laterality: Right;    RIGHT HEART CATHETERIZATION Right 11/20/2023    Procedure: INSERTION, CATHETER, RIGHT HEART;  Surgeon: Sajan Hurley MD;  Location: [facility] CATH LAB;  Service: Cardiology;  Laterality: Right;    RIGHT HEART CATHETERIZATION Right 1/22/2024    Procedure: INSERTION, CATHETER, RIGHT HEART;  Surgeon: Brayan Ocampo MD;  Location: [facility]  LAB;  Service: Cardiology;  Laterality: Right;    STERNAL WOUND CLOSURE N/A 12/4/2023    Procedure: CLOSURE, WOUND, STERNUM;  Surgeon: Yuri Washington MD;  [facility];  Service: Cardiovascular;  Laterality: N/A;    STERNOTOMY N/A 12/1/2023    Procedure: STERNOTOMY, REDO;  Surgeon: Yuri Washington MD;  [facility];  Service: Cardiovascular;  Laterality: N/A;    VALVULOPLASTY, MITRAL VALVE N/A 12/1/2023    Procedure: VALVULOPLASTY, MITRAL VALVE;  Surgeon: Yuri Washington MD;  [facility];  Service: Cardiovascular;  Laterality: N/A;       Review of patient's allergies indicates:  No Known Allergies    Current Facility-Administered Medications   Medication    [START ON 4/3/2025] amiodarone tablet 400 mg    amitriptyline tablet 25 mg    atorvastatin tablet 40 mg    dextrose 50% injection 12.5 g    dextrose 50% injection 25 g    glucagon (human recombinant) injection 1 mg    glucose chewable tablet 16 g    glucose chewable tablet 24 g    insulin aspart U-100 pen 0-5 Units    [START ON 4/3/2025] pregabalin capsule 100 mg    [START ON 4/3/2025] venlafaxine tablet 37.5 mg    [START ON 4/4/2025] warfarin (COUMADIN) split tablet 1.25 mg    [START ON 4/3/2025] warfarin (COUMADIN) tablet 2.5 mg     Family History    None       Tobacco Use    Smoking status: Former     Current packs/day: 0.50     Types: Cigarettes    Smokeless tobacco: Not on file   Substance and Sexual Activity    Alcohol use: Yes     Comment: rarely    Drug use: No    Sexual activity: Not Currently     Partners: Female     Review of Systems   Constitutional:  Positive for fatigue. Negative for chills, diaphoresis and fever.   HENT:  Negative for drooling and trouble swallowing.    Respiratory:  Negative for cough, chest tightness and shortness of breath.    Gastrointestinal:  Positive for nausea. Negative for vomiting.   Genitourinary:  Negative for frequency and hematuria.   Skin:  Negative for pallor and rash.    Neurological:  Negative for light-headedness.   Psychiatric/Behavioral:  Negative for confusion.      Objective:     Vital Signs (Most Recent):  Temp: 98.1 °F (36.7 °C) (04/02/25 2100)  Pulse: 71 (04/02/25 2100)  Resp: 18 (04/02/25 2100)  BP: (!) 100/0 (04/02/25 2100)  SpO2: 100 % (04/02/25 2100) Vital Signs (24h Range):  Temp:  [98.1 °F (36.7 °C)-98.9 °F (37.2 °C)] 98.1 °F (36.7 °C)  Pulse:  [71-82] 71  Resp:  [13-22] 18  SpO2:  [98 %-100 %] 100 %  BP: ()/(0-82) 100/0     Patient Vitals for the past 72 hrs (Last 3 readings):   Weight   04/02/25 2115 80.8 kg (178 lb 2.1 oz)     Body mass index is 24.16 kg/m².    No intake or output data in the 24 hours ending 04/02/25 2223       Physical Exam  Vitals and nursing note reviewed.   Constitutional:       General: He is not in acute distress.     Appearance: He is well-developed.   HENT:      Head: Normocephalic and atraumatic.   Eyes:      General: No scleral icterus.     Pupils: Pupils are equal, round, and reactive to light.   Neck:      Vascular: No JVD.      Trachea: No tracheal deviation.   Cardiovascular:      Rate and Rhythm: Normal rate and regular rhythm.      Comments: Smooth VAD Hum  Pulmonary:      Effort: Pulmonary effort is normal.      Breath sounds: Normal breath sounds.   Chest:      Chest wall: No tenderness.   Abdominal:      General: Bowel sounds are normal.      Palpations: Abdomen is soft.   Musculoskeletal:         General: Normal range of motion.      Cervical back: Normal range of motion and neck supple.      Right lower leg: No edema.      Left lower leg: No edema.   Lymphadenopathy:      Cervical: No cervical adenopathy.   Skin:     General: Skin is warm and dry.      Capillary Refill: Capillary refill takes less than 2 seconds.   Neurological:      Mental Status: He is alert.   Psychiatric:         Mood and Affect: Mood normal.            Significant Labs:  CBC:  Recent Labs   Lab 04/01/25  0910 04/02/25  1402   WBC 6.38 5.53   RBC  "3.61* 3.63*   HGB 9.2* 9.4*   HCT 31.5* 31.3*    238   MCV 87 86   MCH 25.5* 25.9*   MCHC 29.2* 30.0*     BNP:  Recent Labs   Lab 04/01/25  0910 04/02/25  1436   * 559*     CMP:  Recent Labs   Lab 04/01/25  0910 04/02/25  1402   CALCIUM 8.5* 8.6*   ALBUMIN 2.5* 2.5*    137   K 5.1 4.2   CO2 23 25    102   BUN 35* 20   CREATININE 6.5* 4.5*   ALKPHOS 105 105   ALT 25 27   AST 22 25   BILITOT 0.5 0.4      Coagulation:   Recent Labs   Lab 04/01/25  0910 04/02/25  1402   INR 2.9* 2.8*   APTT  --  46.4*     LDH:  No results for input(s): "LDH" in the last 72 hours.  Microbiology:  Microbiology Results (last 7 days)       ** No results found for the last 168 hours. **            BMP:   Recent Labs   Lab 04/02/25  1402      K 4.2      CO2 25   BUN 20   CREATININE 4.5*   CALCIUM 8.6*   MG 1.9     I have reviewed all pertinent labs within the past 24 hours.    Diagnostic Results:  Echo: I have reviewed all pertinent results/findings within the past 24 hours and my personal findings are:       "

## 2025-04-03 NOTE — PT/OT/SLP EVAL
Physical Therapy Co-Evaluation and Treatment    OT present for coeval due to pt's multiple medical comorbidities and functional/cognition deficits requiring two skilled therapists to appropriately progress pt's musculoskeletal strength, neuromuscular control, and endurance while taking into consideration medical acuity and pt safety.    Patient Name:  Radha Abbott   MRN:  [id number]    Recommendations:     Discharge Recommendations: Low Intensity Therapy   Discharge Equipment Recommendations: none   Barriers to discharge: None    Assessment:     Radha Abbott is a 64 y.o. male admitted with a medical diagnosis of <principal problem not specified>.  He presents with the following impairments/functional limitations: weakness, impaired endurance, impaired cardiopulmonary response to activity, gait instability, impaired functional mobility, impaired balance, impaired self care skills     Pt receptive and tolerated PT co-eval with OT fairly. Activity limited due to pt having a low flow alarm with reported lightheadedness on 1st sit <> stand attempt dropping from 4.1 to 2.1, pt able to sit safely with decreased in symptoms. Sit <> stand attempted again with MD and RN in room with pts LVAD flow dropping from 3.4 to 2.3 with reported dizziness, pt able to safety sit with symptoms decreasing. Patient currently demonstrates a need for low intensity therapy on a scheduled basis secondary to a decline in functional status due to admission.    Rehab Prognosis: Good; patient would benefit from acute skilled PT services to address these deficits and reach maximum level of function.    Recent Surgery: * No surgery found *      Plan:     During this hospitalization, patient to be seen 3 x/week to address the identified rehab impairments via gait training, therapeutic activities, therapeutic exercises, neuromuscular re-education and progress toward the following goals:    Plan of Care Expires:  05/03/25    Subjective     Chief Complaint:  lightheadedness with standing  Patient/Family Comments/goals: to go home  Pain/Comfort:  Pain Rating 1: 9/10  Location - Orientation 1: lower  Location 1: back  Pain Addressed 1: Reposition, Distraction    Patients cultural, spiritual, Caodaism conflicts given the current situation: no    Patient History:     Living Environment: Pt lives with spouse in [facility] with no JACKIE. Bathroom: walk-in shower with built in bench   Prior Level of Function: Mod I using RW but reports recent hospital admission due to falls/syncope  DME owned: ELAINE, rollator, BSC, w/c  Caregiver Assistance: spouse    Objective:     Communicated with RN prior to session.  Patient found up in chair with LVAD  upon PT entry to room.    General Precautions: Standard, fall, LVAD  Orthopedic Precautions:N/A   Braces: N/A  Respiratory Status: Room air    Exams:  Sensation:    -       Intact  RLE ROM: WFL  RLE Strength: WFL  LLE ROM: WFL  LLE Strength: WFL    Functional Mobility:    Bed Mobility:   N/T 2/2 pt sitting up in chair upon PT arrival     Transfers:   Sit <> Stand Transfer: contact guard assistance from bedside chair using rolling walker  x2 Trials    Balance:   Standing balance:   FAIR: Maintains without assist but unable to take challenges  Pt standing with RW and CGA only ~10-20 seconds each attempt                 Gait:  Did not amb due to low flow alarm on 1st attempt and reports of lightheadedness with standing on both attempts      AM-PAC 6 CLICK MOBILITY  Total Score:21       Treatment & Education:  Pt educated on tip to reduce fall risk and safety with mobility and using call button for assistance from nursing staff with OOB mobility.  Pt educated on sitting up in chair throughout most of day  Pt educated on amb 2-3x per day with assistance from staff and increased movement during hospital stay.  All questions answered within the scope of PT.  White board updated accordingly.    Patient left up in chair with all lines intact, call button in  reach, and RN present.    GOALS:   Multidisciplinary Problems       Physical Therapy Goals          Problem: Physical Therapy    Goal Priority Disciplines Outcome Interventions   Physical Therapy Goal     PT, PT/OT Progressing    Description: Goals to be met by: 5/3/25     Patient will increase functional independence with mobility by performin. Sit to stand transfer with Modified [place]  2. Bed to chair transfer with Modified [place] using LRAD  3. Gait  x 150 feet with Modified [place] using LRAD.                          DME Justifications:  No DME recommended requiring DME justifications    History:     Past Medical History:   Diagnosis Date    Aspiration pneumonia of both lungs 2024    CAD (coronary artery disease)     CHF (congestive heart failure)     Delirium 2024    Diabetes mellitus     HFrEF (heart failure with reduced ejection fraction)     Hypoglycemia 2024    ICD (implantable cardioverter-defibrillator) in place     LVAD (left ventricular assist device) present 2023    MI, old     PAF (paroxysmal atrial fibrillation) 10/11/2023    Stage 4 chronic kidney disease 02/15/2024    Type 2 diabetes mellitus without complication, without long-term current use of insulin 10/07/2023       Past Surgical History:   Procedure Laterality Date    ANGIOPLASTY-VENOUS ARTERY Right 2023    Procedure: ANGIOPLASTY-VENOUS ARTERY, RIGHT FEMORAL;  Surgeon: Yuri Washington MD;  Location: [facility] OR [address] [place] [address];  Service: Cardiovascular;  Laterality: Right;    AORTIC VALVULOPLASTY N/A 2023    Procedure: REPAIR, AORTIC VALVE;  Surgeon: Yuri Washington MD;  Location: [facility] OR [address] [place] [address];  Service: Cardiovascular;  Laterality: N/A;    CARDIAC SURGERY      CLOSURE N/A 2023    Procedure: CLOSURE, TEMPORARY;  Surgeon: Yuri Washington MD;  Location: [facility] OR [address] [place] [address];  Service: Cardiovascular;  Laterality: N/A;    DRAINAGE OF PLEURAL EFFUSION  2023    Procedure: DRAINAGE, PLEURAL  EFFUSION;  Surgeon: Yuri Washington MD;  Location: [facility] OR [facility];  Service: Cardiovascular;;    INSERTION OF GRAFT TO PERICARDIUM  12/4/2023    Procedure: INSERTION, GRAFT, PERICARDIUM;  Surgeon: Yuri Washington MD;  [facility];  Service: Cardiovascular;;    INSERTION OF INTRA-AORTIC BALLOON ASSIST DEVICE Right 11/21/2023    Procedure: INSERTION, INTRA-AORTIC BALLOON PUMP;  Surgeon: Finn Cohn MD;  Location: [facility] CATH LAB;  Service: Cardiology;  Laterality: Right;    LEFT VENTRICULAR ASSIST DEVICE Left 12/1/2023    Procedure: INSERTION-LEFT VENTRICULAR ASSIST DEVICE;  Surgeon: Yuri Washington MD;  [facility];  Service: Cardiovascular;  Laterality: Left;  REDO STERNOTOMY - REDO SAW NEEDED FOR CASE    LYSIS OF ADHESIONS  12/1/2023    Procedure: LYSIS, ADHESIONS;  Surgeon: Yuri Washington MD;  [facility];  Service: Cardiovascular;;    PLACEMENT OF SWAN ROLANDO CATHETER WITH IMAGING GUIDANCE  11/20/2023    Procedure: INSERTION, CATHETER, SWAN-ROLANDO, WITH IMAGING GUIDANCE;  Surgeon: Sajan Hurley MD;  Location: [facility] CATH LAB;  Service: Cardiology;;    REMOVAL OF TUNNELED CENTRAL VENOUS CATHETER (CVC) N/A 3/1/2024    Procedure: REMOVAL, CATHETER, CENTRAL VENOUS, TUNNELED;  Surgeon: Seble Aguilar MD;  Location: [facility] CATH LAB;  Service: Interventional Nephrology;  Laterality: N/A;    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 12/1/2023    Procedure: REPAIR, ANEURYSM, ARTERY, FEMORAL;  Surgeon: Yuri Washington MD;  [facility];  Service: Cardiovascular;  Laterality: Right;  Right Femoral Artery Repair    RIGHT HEART CATHETERIZATION Right 10/10/2023    Procedure: INSERTION, CATHETER, RIGHT HEART;  Surgeon: Bin Gandhi MD;  Location: [facility] CATH LAB;  Service: Cardiology;  Laterality: Right;    RIGHT HEART CATHETERIZATION Right 10/13/2023    Procedure: INSERTION, CATHETER, RIGHT HEART;  Surgeon: Walter Mcintyre MD;  Location: [facility] CATH LAB;   Service: Cardiology;  Laterality: Right;    RIGHT HEART CATHETERIZATION  11/13/2023    RIGHT HEART CATHETERIZATION Right 11/13/2023    Procedure: INSERTION, CATHETER, RIGHT HEART;  Surgeon: Juventino Bermudez Jr., MD;  Location: [facility] CATH LAB;  Service: Cardiology;  Laterality: Right;    RIGHT HEART CATHETERIZATION Right 11/20/2023    Procedure: INSERTION, CATHETER, RIGHT HEART;  Surgeon: Sajan Hurley MD;  Location: [facility] CATH LAB;  Service: Cardiology;  Laterality: Right;    RIGHT HEART CATHETERIZATION Right 1/22/2024    Procedure: INSERTION, CATHETER, RIGHT HEART;  Surgeon: Brayan Ocampo MD;  Location: [facility] CATH LAB;  Service: Cardiology;  Laterality: Right;    STERNAL WOUND CLOSURE N/A 12/4/2023    Procedure: CLOSURE, WOUND, STERNUM;  Surgeon: Yuri Washington MD;  [facility];  Service: Cardiovascular;  Laterality: N/A;    STERNOTOMY N/A 12/1/2023    Procedure: STERNOTOMY, REDO;  Surgeon: Yuri Washington MD;  Location: [facility] OR [facility];  Service: Cardiovascular;  Laterality: N/A;    VALVULOPLASTY, MITRAL VALVE N/A 12/1/2023    Procedure: VALVULOPLASTY, MITRAL VALVE;  Surgeon: Yuri Washington MD;  [facility];  Service: Cardiovascular;  Laterality: N/A;       Time Tracking:     PT Received On: 04/03/25  PT Start Time: 1029     PT Stop Time: 1055  PT Total Time (min): 26 min     Billable Minutes: Evaluation 15 and Neuromuscular Re-education 10      04/03/2025

## 2025-04-03 NOTE — ASSESSMENT & PLAN NOTE
On MWF HD.  S/p HD yesterday with removal of 2.5 L  Nephrology on board- appreciate assistance for potential HD   Discussed need for potentially adjusted dry weight to prevent volume depletion

## 2025-04-03 NOTE — PLAN OF CARE
PT eval completed- see note for details, goals and POC established.     Problem: Physical Therapy  Goal: Physical Therapy Goal  Description: Goals to be met by: 5/3/25     Patient will increase functional independence with mobility by performin. Sit to stand transfer with Modified [place]  2. Bed to chair transfer with Modified [place] using LRAD  3. Gait  x 150 feet with Modified [place] using LRAD.     Outcome: Progressing   4/3/2025

## 2025-04-03 NOTE — PROGRESS NOTES
[facility] - Cardiac Intensive Care  Heart Transplant  Progress Note    Patient Name: Radha Abbott  MRN: [id number]  Admission Date: 4/2/2025  Hospital Length of Stay: 1 days  Attending Physician: Brayan Ocampo MD  Primary Care Provider: Vasu Kong MD  Principal Problem:<principal problem not specified>    Subjective:   Interval History: Admitted yesterday due to syncopal episode follwing HD. This AM with standing to work with PT/OT he got light headed and LVAD flows dropped to 2.3, flows quickly return to 3.5+ with sitting back down. No visible JVD on him so suspect volume depletion. Will get 250cc IVF.    Continuous Infusions:  Scheduled Meds:   amiodarone  400 mg Oral Daily    amitriptyline  25 mg Oral Once per day on Monday Wednesday Friday    atorvastatin  40 mg Oral QHS    guaiFENesin  600 mg Oral BID    mupirocin   Nasal BID    potassium, sodium phosphates  2 packet Oral BID    pregabalin  100 mg Oral Every other day    sodium chloride 0.9%  250 mL Intravenous Once    venlafaxine  37.5 mg Oral Daily    [START ON 4/4/2025] warfarin  1.25 mg Oral Every Mon, Wed, Fri    warfarin  2.5 mg Oral Every Tues, Thurs, Sat, Sun     PRN Meds:  Current Facility-Administered Medications:     dextrose 50%, 12.5 g, Intravenous, PRN    dextrose 50%, 25 g, Intravenous, PRN    glucagon (human recombinant), 1 mg, Intramuscular, PRN    glucose, 16 g, Oral, PRN    glucose, 24 g, Oral, PRN    insulin aspart U-100, 0-5 Units, Subcutaneous, QID (AC + HS) PRN    Review of patient's allergies indicates:  No Known Allergies  Objective:     Vital Signs (Most Recent):  Temp: 98.8 °F (37.1 °C) (04/03/25 1112)  Pulse: 83 (04/03/25 1112)  Resp: 18 (04/03/25 0730)  BP: (!) 75/55 (04/03/25 1112)  SpO2: 98 % (04/03/25 0730) Vital Signs (24h Range):  Temp:  [98.1 °F (36.7 °C)-98.9 °F (37.2 °C)] 98.8 °F (37.1 °C)  Pulse:  [] 83  Resp:  [13-22] 18  SpO2:  [95 %-100 %] 98 %  BP: ()/(0-82) 75/55     Patient Vitals for the past 72  hrs (Last 3 readings):   Weight   04/02/25 2115 80.8 kg (178 lb 2.1 oz)     Body mass index is 23.5 kg/m².      Intake/Output Summary (Last 24 hours) at 4/3/2025 1237  Last data filed at 4/3/2025 0906  Gross per 24 hour   Intake 718 ml   Output 100 ml   Net 618 ml            Physical Exam  Constitutional:       Appearance: Normal appearance.   Neck:      Comments: JVP not elevated  Cardiovascular:      Rate and Rhythm: Normal rate and regular rhythm.      Pulses: Normal pulses.      Heart sounds: Normal heart sounds.      Comments: LVAD hum  Pulmonary:      Effort: Pulmonary effort is normal. No respiratory distress.      Breath sounds: Normal breath sounds. No wheezing or rales.   Abdominal:      General: Abdomen is flat. There is no distension.      Palpations: Abdomen is soft.      Tenderness: There is no abdominal tenderness.   Musculoskeletal:      Cervical back: Normal range of motion and neck supple.      Right lower leg: No edema.      Left lower leg: No edema.   Skin:     General: Skin is warm and dry.   Neurological:      General: No focal deficit present.      Mental Status: He is alert and oriented to person, place, and time.   Psychiatric:         Mood and Affect: Mood normal.         Behavior: Behavior normal.            Significant Labs:  CBC:  Recent Labs   Lab 04/01/25  0910 04/02/25  1402 04/03/25  0321   WBC 6.38 5.53 5.02   RBC 3.61* 3.63* 3.39*   HGB 9.2* 9.4* 8.6*   HCT 31.5* 31.3* 29.2*    238 196   MCV 87 86 86   MCH 25.5* 25.9* 25.4*   MCHC 29.2* 30.0* 29.5*     BNP:  Recent Labs   Lab 04/01/25  0910 04/02/25  1436   * 559*     CMP:  Recent Labs   Lab 04/01/25  0910 04/02/25  1402 04/03/25  0321   CALCIUM 8.5* 8.6* 8.4*   ALBUMIN 2.5* 2.5*  --     137 136   K 5.1 4.2 4.2   CO2 23 25 22*    102 106   BUN 35* 20 23   CREATININE 6.5* 4.5* 5.2*   ALKPHOS 105 105  --    ALT 25 27  --    AST 22 25  --    BILITOT 0.5 0.4  --       Coagulation:   Recent Labs   Lab  04/01/25  0910 04/02/25  1402 04/03/25  0321   INR 2.9* 2.8* 3.3*   APTT  --  46.4* 47.7*     LDH:  Recent Labs   Lab 04/03/25  0321   *     Microbiology:  Microbiology Results (last 7 days)       Procedure Component Value Units Date/Time    Blood culture [[id number]] Collected: 04/03/25 1036    Order Status: Sent Specimen: Blood Updated: 04/03/25 1155    Blood culture [[id number]] Collected: 04/03/25 1041    Order Status: Sent Specimen: Blood Updated: 04/03/25 1155            I have reviewed all pertinent labs within the past 24 hours.    Estimated Creatinine Clearance: 16.6 mL/min (A) (based on SCr of 5.2 mg/dL (H)).    Diagnostic Results:  I have reviewed all pertinent imaging results/findings within the past 24 hours.  Assessment and Plan:     No notes on file    Falls  Likely in setting of debility  PT/OT ordered  Will benefit from at home therapy upon discharge    Chronic combined systolic and diastolic CHF, NYHA class 4  2/2 iCM  S/p Heartmate III placed on 12/01/2023   On warfarin and ASA   Euvolemic/possibly hypovolemic on examination today    Anticoagulant long-term use  In setting of VAD  Goal INR 2-3    ESRD (end stage renal disease)  On MWF HD.  S/p HD yesterday with removal of 2.5 L  Nephrology on board- appreciate assistance for potential HD   Discussed need for potentially adjusted dry weight to prevent volume depletion    LVAD (left ventricular assist device) present  Procedure: Device Interrogation Including analysis of device parameters  Current Settings: Ventricular Assist Device  INR supratherapeutic  Flows dropping this AM with changes in posture (sitting to standing), will get IVF/liberate PO fluid restriction  Review of device function is stable      4/3/2025    10:55 AM 4/3/2025    10:54 AM 4/3/2025    10:51 AM 4/3/2025    10:50 AM 4/3/2025     9:09 AM 4/3/2025     5:23 AM 4/3/2025    12:00 AM   TXP LVAD INTERROGATIONS   Type HeartMate3 HeartMate3 HeartMate3 HeartMate3 HeartMate3  HeartMate3 HeartMate3   Flow 2.4  3.3 2.3  4.1 2.4 2.8 3   Speed     5000 5000 5000   PI     6 8.5 8.8   Power ([place])     3.4 3.6 3.5   LSL      4600 4600   Pulsatility      Pulse Pulse       Significant value               Jimy An PA-C  Heart Transplant  [facility] - Cardiac Intensive Care

## 2025-04-03 NOTE — ASSESSMENT & PLAN NOTE
Endocrinology consulted for BG management.   BG goal 140-180    - Novolog (Insulin Aspart) prn for BG excursions LDC SSI (150/50)  - BG checks AC/HS  - Hypoglycemia protocol in place    ** Please notify Endocrine for any change and/or advance in diet**  ** Please call Endocrine for any BG related issues **    Discharge Planning:   TBD. Please notify endocrinology prior to discharge.

## 2025-04-03 NOTE — H&P
"[facility] - Cardiac Intensive Care  Heart Transplant  H&P    Patient Name: Radha Abbott  MRN: [id number]  Admission Date: 4/2/2025  Attending Physician: Brayan Ocampo MD  Primary Care Provider: Vasu Kong MD  Principal Problem:<principal problem not specified>    Subjective:     History of Present Illness:  65 y/o AAM w/ PMHx of stage D HFrEF 2/2 ICM s/p HM3 as DT (12/1/2023), ESRD on HD (MWF), and debility who presented to hospital as transfer from ED in [place] for syncope.  He had a recent hospitalization at [facility] from 3/10-3/13/25 for falls. Workup during hospitalization was negative for ny acute processes and he was discharged home with outpatient PT.   Per wife, since he has been home he has continued to feel weak and nauseous. Otherwise, no worsening sob/huerta, palpitations, cp/chest discomfort.  This AM after he had a HD session, his sister drove him home. As he was getting out of the car he felt more "weak" and syncopized. He does not have any recollection of the event however his sister ( and wife who was on the phone) report him losing consciousness. LVAD low flow alarm was heard.  They then took him to the nearest ED who then transferred him to [facility] for higher level of care.  Labs without any signficant changes since prior.  LVAD interrogation with 1 low flow alarm at 10:46 AM with flow of 2.2.  Interrogation of his medtronic device did not reveal any arrhythmias since Dec 5,2023.   Past Medical History:   Diagnosis Date    Aspiration pneumonia of both lungs 06/17/2024    CAD (coronary artery disease)     CHF (congestive heart failure)     Delirium 06/16/2024    Diabetes mellitus     HFrEF (heart failure with reduced ejection fraction)     Hypoglycemia 06/12/2024    ICD (implantable cardioverter-defibrillator) in place     LVAD (left ventricular assist device) present 12/01/2023    MI, old     PAF (paroxysmal atrial fibrillation) 10/11/2023    Stage 4 chronic kidney disease 02/15/2024    Type 2 " diabetes mellitus without complication, without long-term current use of insulin 10/07/2023       Past Surgical History:   Procedure Laterality Date    ANGIOPLASTY-VENOUS ARTERY Right 12/1/2023    Procedure: ANGIOPLASTY-VENOUS ARTERY, RIGHT FEMORAL;  Surgeon: Yuri Washington MD;  Location: [facility] OR [facility] FLR;  Service: Cardiovascular;  Laterality: Right;    AORTIC VALVULOPLASTY N/A 12/1/2023    Procedure: REPAIR, AORTIC VALVE;  Surgeon: Yuri Washington MD;  Location: [facility] OR [facility] FLR;  Service: Cardiovascular;  Laterality: N/A;    CARDIAC SURGERY      CLOSURE N/A 12/1/2023    Procedure: CLOSURE, TEMPORARY;  Surgeon: Yuri Washington MD;  Location: [facility] OR [facility] FLR;  Service: Cardiovascular;  Laterality: N/A;    DRAINAGE OF PLEURAL EFFUSION  12/4/2023    Procedure: DRAINAGE, PLEURAL EFFUSION;  Surgeon: Yuri Washington MD;  [facility];  Service: Cardiovascular;;    INSERTION OF GRAFT TO PERICARDIUM  12/4/2023    Procedure: INSERTION, GRAFT, PERICARDIUM;  Surgeon: Yuri Washington MD;  [facility];  Service: Cardiovascular;;    INSERTION OF INTRA-AORTIC BALLOON ASSIST DEVICE Right 11/21/2023    Procedure: INSERTION, INTRA-AORTIC BALLOON PUMP;  Surgeon: Finn Cohn MD;  Location: [facility] CATH LAB;  Service: Cardiology;  Laterality: Right;    LEFT VENTRICULAR ASSIST DEVICE Left 12/1/2023    Procedure: INSERTION-LEFT VENTRICULAR ASSIST DEVICE;  Surgeon: Yuri Washington MD;  [facility];  Service: Cardiovascular;  Laterality: Left;  REDO STERNOTOMY - REDO SAW NEEDED FOR CASE    LYSIS OF ADHESIONS  12/1/2023    Procedure: LYSIS, ADHESIONS;  Surgeon: Yuri Washington MD;  Location: [facility] OR [facility] FLR;  Service: Cardiovascular;;    PLACEMENT OF SWAN ROLANDO CATHETER WITH IMAGING GUIDANCE  11/20/2023    Procedure: INSERTION, CATHETER, SWAN-ROLANDO, WITH IMAGING GUIDANCE;  Surgeon: Sajan Hurley MD;  Location: [facility] CATH LAB;  Service: Cardiology;;    REMOVAL OF TUNNELED CENTRAL VENOUS  CATHETER (CVC) N/A 3/1/2024    Procedure: REMOVAL, CATHETER, CENTRAL VENOUS, TUNNELED;  Surgeon: Seble Aguilar MD;  Location: [facility] CATH LAB;  Service: Interventional Nephrology;  Laterality: N/A;    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 12/1/2023    Procedure: REPAIR, ANEURYSM, ARTERY, FEMORAL;  Surgeon: Yuri Washington MD;  Location: [facility] OR [facility] FLR;  Service: Cardiovascular;  Laterality: Right;  Right Femoral Artery Repair    RIGHT HEART CATHETERIZATION Right 10/10/2023    Procedure: INSERTION, CATHETER, RIGHT HEART;  Surgeon: Bin Gandhi MD;  Location: [facility] CATH LAB;  Service: Cardiology;  Laterality: Right;    RIGHT HEART CATHETERIZATION Right 10/13/2023    Procedure: INSERTION, CATHETER, RIGHT HEART;  Surgeon: Walter Mcintyre MD;  Location: [facility] CATH LAB;  Service: Cardiology;  Laterality: Right;    RIGHT HEART CATHETERIZATION  11/13/2023    RIGHT HEART CATHETERIZATION Right 11/13/2023    Procedure: INSERTION, CATHETER, RIGHT HEART;  Surgeon: Juventino Bermudez Jr., MD;  Location: [facility] CATH LAB;  Service: Cardiology;  Laterality: Right;    RIGHT HEART CATHETERIZATION Right 11/20/2023    Procedure: INSERTION, CATHETER, RIGHT HEART;  Surgeon: Sajan Hurley MD;  Location: [facility] CATH LAB;  Service: Cardiology;  Laterality: Right;    RIGHT HEART CATHETERIZATION Right 1/22/2024    Procedure: INSERTION, CATHETER, RIGHT HEART;  Surgeon: Brayan Ocampo MD;  Location: [facility] CATH LAB;  Service: Cardiology;  Laterality: Right;    STERNAL WOUND CLOSURE N/A 12/4/2023    Procedure: CLOSURE, WOUND, STERNUM;  Surgeon: Yuri Washington MD;  Location: [facility] OR [facility] FLR;  Service: Cardiovascular;  Laterality: N/A;    STERNOTOMY N/A 12/1/2023    Procedure: STERNOTOMY, REDO;  Surgeon: Yuri Washington MD;  Location: [address] FLR;  Service: Cardiovascular;  Laterality: N/A;    VALVULOPLASTY, MITRAL VALVE N/A 12/1/2023    Procedure: VALVULOPLASTY, MITRAL VALVE;  Surgeon: Yuri Washington MD;  Location: [address]  FLR;  Service: Cardiovascular;  Laterality: N/A;       Review of patient's allergies indicates:  No Known Allergies    Current Facility-Administered Medications   Medication    [START ON 4/3/2025] amiodarone tablet 400 mg    amitriptyline tablet 25 mg    atorvastatin tablet 40 mg    dextrose 50% injection 12.5 g    dextrose 50% injection 25 g    glucagon (human recombinant) injection 1 mg    glucose chewable tablet 16 g    glucose chewable tablet 24 g    insulin aspart U-100 pen 0-5 Units    [START ON 4/3/2025] pregabalin capsule 100 mg    [START ON 4/3/2025] venlafaxine tablet 37.5 mg    [START ON 4/4/2025] warfarin (COUMADIN) split tablet 1.25 mg    [START ON 4/3/2025] warfarin (COUMADIN) tablet 2.5 mg     Family History    None       Tobacco Use    Smoking status: Former     Current packs/day: 0.50     Types: Cigarettes    Smokeless tobacco: Not on file   Substance and Sexual Activity    Alcohol use: Yes     Comment: rarely    Drug use: No    Sexual activity: Not Currently     Partners: Female     Review of Systems   Constitutional:  Positive for fatigue. Negative for chills, diaphoresis and fever.   HENT:  Negative for drooling and trouble swallowing.    Respiratory:  Negative for cough, chest tightness and shortness of breath.    Gastrointestinal:  Positive for nausea. Negative for vomiting.   Genitourinary:  Negative for frequency and hematuria.   Skin:  Negative for pallor and rash.   Neurological:  Negative for light-headedness.   Psychiatric/Behavioral:  Negative for confusion.      Objective:     Vital Signs (Most Recent):  Temp: 98.1 °F (36.7 °C) (04/02/25 2100)  Pulse: 71 (04/02/25 2100)  Resp: 18 (04/02/25 2100)  BP: (!) 100/0 (04/02/25 2100)  SpO2: 100 % (04/02/25 2100) Vital Signs (24h Range):  Temp:  [98.1 °F (36.7 °C)-98.9 °F (37.2 °C)] 98.1 °F (36.7 °C)  Pulse:  [71-82] 71  Resp:  [13-22] 18  SpO2:  [98 %-100 %] 100 %  BP: ()/(0-82) 100/0     Patient Vitals for the past 72 hrs (Last 3  readings):   Weight   04/02/25 2115 80.8 kg (178 lb 2.1 oz)     Body mass index is 24.16 kg/m².    No intake or output data in the 24 hours ending 04/02/25 2223       Physical Exam  Vitals and nursing note reviewed.   Constitutional:       General: He is not in acute distress.     Appearance: He is well-developed.   HENT:      Head: Normocephalic and atraumatic.   Eyes:      General: No scleral icterus.     Pupils: Pupils are equal, round, and reactive to light.   Neck:      Vascular: No JVD.      Trachea: No tracheal deviation.   Cardiovascular:      Rate and Rhythm: Normal rate and regular rhythm.      Comments: Smooth VAD Hum  Pulmonary:      Effort: Pulmonary effort is normal.      Breath sounds: Normal breath sounds.   Chest:      Chest wall: No tenderness.   Abdominal:      General: Bowel sounds are normal.      Palpations: Abdomen is soft.   Musculoskeletal:         General: Normal range of motion.      Cervical back: Normal range of motion and neck supple.      Right lower leg: No edema.      Left lower leg: No edema.   Lymphadenopathy:      Cervical: No cervical adenopathy.   Skin:     General: Skin is warm and dry.      Capillary Refill: Capillary refill takes less than 2 seconds.   Neurological:      Mental Status: He is alert.   Psychiatric:         Mood and Affect: Mood normal.            Significant Labs:  CBC:  Recent Labs   Lab 04/01/25  0910 04/02/25  1402   WBC 6.38 5.53   RBC 3.61* 3.63*   HGB 9.2* 9.4*   HCT 31.5* 31.3*    238   MCV 87 86   MCH 25.5* 25.9*   MCHC 29.2* 30.0*     BNP:  Recent Labs   Lab 04/01/25  0910 04/02/25  1436   * 559*     CMP:  Recent Labs   Lab 04/01/25  0910 04/02/25  1402   CALCIUM 8.5* 8.6*   ALBUMIN 2.5* 2.5*    137   K 5.1 4.2   CO2 23 25    102   BUN 35* 20   CREATININE 6.5* 4.5*   ALKPHOS 105 105   ALT 25 27   AST 22 25   BILITOT 0.5 0.4      Coagulation:   Recent Labs   Lab 04/01/25  0910 04/02/25  1402   INR 2.9* 2.8*   APTT  --  46.4*  "    LDH:  No results for input(s): "LDH" in the last 72 hours.  Microbiology:  Microbiology Results (last 7 days)       ** No results found for the last 168 hours. **            BMP:   Recent Labs   Lab 04/02/25  1402      K 4.2      CO2 25   BUN 20   CREATININE 4.5*   CALCIUM 8.6*   MG 1.9     I have reviewed all pertinent labs within the past 24 hours.    Diagnostic Results:  Echo: I have reviewed all pertinent results/findings within the past 24 hours and my personal findings are:       Assessment/Plan:     Syncope  64 y.o male with h.o stage D heart failure 2/2 iCM s/p HMIII on 12/1/23 who presents to the hospital for an episode of syncope post HD.  Interrogation of device does not reveal any arrhythmogenic etiology of his syncope. VAD interrogation showed low flow alarm at the time.   Etiology is likely orthostatic.    Will monitor him while inpatient  Cont home medication  PT/OT  Maintain euvolemia        Frequent Falls  Likely in setting of debility  PT/OT ordered  Will benefit from at home therapy upon discharge    Chronic combined systolic and diastolic CHF, NYHA class 4  2/2 iCM  S/p Heartmate III placed on 12/01/2023   On warfarin and ASA   Euvolemic on examination today (s/p HD this AM)    Anticoagulant long-term use  In setting of VAD  Goal INR 2-3    ESRD (end stage renal disease)  On MWF HD.  S/p HD today with removal of 2.5 L  Nephrology on board- appreciate assistance for potential HD if cont to be hospitalized until Friday.    LVAD (left ventricular assist device) present  Procedure: Device Interrogation Including analysis of device parameters  Current Settings: Ventricular Assist Device  Review of device function is stable      4/2/2025     9:26 PM 3/20/2025     1:38 PM 3/12/2025     3:01 PM 3/12/2025    12:00 PM 3/12/2025     9:15 AM 3/12/2025     4:30 AM 3/12/2025    12:12 AM   TXP LVAD INTERROGATIONS   Type HeartMate3  HeartMate3 HeartMate3 HeartMate3 HeartMate3 HeartMate3   Flow " 2.8  3.8 3.8 3.7 3.8 3.4   Speed 5000  5000 5000 5000 5000 5000   PI 9.2  4.3 4.6  4.5 7.4   Power ([place]) 3.5  3.7 3.6  3.3 3.4   LSL 4600  4600 4600  4600 4600   Pulsatility Pulse No Pulse    Intermittent pulse Intermittent pulse             Nabila Swan MD  Heart Transplant  [facility] - Cardiac Intensive Care

## 2025-04-03 NOTE — HPI
63 y/o AAM w/ PMHx of stage D HFrEF, ICM s/p HM3 as DT (12/1/2023), ESRD on HD (MWF), and debility who presented to ED in [place] w/ complaints of falls x2.

## 2025-04-03 NOTE — SUBJECTIVE & OBJECTIVE
Past Medical History:   Diagnosis Date    Aspiration pneumonia of both lungs 06/17/2024    CAD (coronary artery disease)     CHF (congestive heart failure)     Delirium 06/16/2024    Diabetes mellitus     HFrEF (heart failure with reduced ejection fraction)     Hypoglycemia 06/12/2024    ICD (implantable cardioverter-defibrillator) in place     LVAD (left ventricular assist device) present 12/01/2023    MI, old     PAF (paroxysmal atrial fibrillation) 10/11/2023    Stage 4 chronic kidney disease 02/15/2024    Type 2 diabetes mellitus without complication, without long-term current use of insulin 10/07/2023       Past Surgical History:   Procedure Laterality Date    ANGIOPLASTY-VENOUS ARTERY Right 12/1/2023    Procedure: ANGIOPLASTY-VENOUS ARTERY, RIGHT FEMORAL;  Surgeon: Yuri Washington MD;  [facility];  Service: Cardiovascular;  Laterality: Right;    AORTIC VALVULOPLASTY N/A 12/1/2023    Procedure: REPAIR, AORTIC VALVE;  Surgeon: Yuri Washington MD;  [facility];  Service: Cardiovascular;  Laterality: N/A;    CARDIAC SURGERY      CLOSURE N/A 12/1/2023    Procedure: CLOSURE, TEMPORARY;  Surgeon: Yuri Washington MD;  Location: [facility] OR [facility] FLR;  Service: Cardiovascular;  Laterality: N/A;    DRAINAGE OF PLEURAL EFFUSION  12/4/2023    Procedure: DRAINAGE, PLEURAL EFFUSION;  Surgeon: Yuri Washington MD;  Location: [facility] OR [facility] FLR;  Service: Cardiovascular;;    INSERTION OF GRAFT TO PERICARDIUM  12/4/2023    Procedure: INSERTION, GRAFT, PERICARDIUM;  Surgeon: Yuri Washington MD;  [facility];  Service: Cardiovascular;;    INSERTION OF INTRA-AORTIC BALLOON ASSIST DEVICE Right 11/21/2023    Procedure: INSERTION, INTRA-AORTIC BALLOON PUMP;  Surgeon: Finn Cohn MD;  Location: [facility] CATH LAB;  Service: Cardiology;  Laterality: Right;    LEFT VENTRICULAR ASSIST DEVICE Left 12/1/2023    Procedure: INSERTION-LEFT VENTRICULAR ASSIST DEVICE;  Surgeon: Yuri Washington MD;   Location: [address] FLR;  Service: Cardiovascular;  Laterality: Left;  REDO STERNOTOMY - REDO SAW NEEDED FOR CASE    LYSIS OF ADHESIONS  12/1/2023    Procedure: LYSIS, ADHESIONS;  Surgeon: Yuri Washington MD;  [address]  Service: Cardiovascular;;    PLACEMENT OF SWAN ROLANDO CATHETER WITH IMAGING GUIDANCE  11/20/2023    Procedure: INSERTION, CATHETER, SWAN-ROLANDO, WITH IMAGING GUIDANCE;  Surgeon: Sajan Hurley MD;  Location: [facility] CATH LAB;  Service: Cardiology;;    REMOVAL OF TUNNELED CENTRAL VENOUS CATHETER (CVC) N/A 3/1/2024    Procedure: REMOVAL, CATHETER, CENTRAL VENOUS, TUNNELED;  Surgeon: Seble Aguilar MD;  Location: [facility] CATH LAB;  Service: Interventional Nephrology;  Laterality: N/A;    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 12/1/2023    Procedure: REPAIR, ANEURYSM, ARTERY, FEMORAL;  Surgeon: Yuri Washington MD;  [address]  Service: Cardiovascular;  Laterality: Right;  Right Femoral Artery Repair    RIGHT HEART CATHETERIZATION Right 10/10/2023    Procedure: INSERTION, CATHETER, RIGHT HEART;  Surgeon: Bin Gandhi MD;  Location: [facility] CATH LAB;  Service: Cardiology;  Laterality: Right;    RIGHT HEART CATHETERIZATION Right 10/13/2023    Procedure: INSERTION, CATHETER, RIGHT HEART;  Surgeon: Walter Mcintyre MD;  Location: [facility] CATH LAB;  Service: Cardiology;  Laterality: Right;    RIGHT HEART CATHETERIZATION  11/13/2023    RIGHT HEART CATHETERIZATION Right 11/13/2023    Procedure: INSERTION, CATHETER, RIGHT HEART;  Surgeon: Juventino Bermudez Jr., MD;  Location: [facility] CATH LAB;  Service: Cardiology;  Laterality: Right;    RIGHT HEART CATHETERIZATION Right 11/20/2023    Procedure: INSERTION, CATHETER, RIGHT HEART;  Surgeon: Sajan Hurley MD;  Location: [facility] CATH LAB;  Service: Cardiology;  Laterality: Right;    RIGHT HEART CATHETERIZATION Right 1/22/2024    Procedure: INSERTION, CATHETER, RIGHT HEART;  Surgeon: Brayan Ocampo MD;  Location: [facility]  LAB;  Service: Cardiology;  Laterality: Right;    STERNAL WOUND CLOSURE N/A 12/4/2023    Procedure: CLOSURE, WOUND, STERNUM;  Surgeon: Yuri Washington MD;  [facility];  Service: Cardiovascular;  Laterality: N/A;    STERNOTOMY N/A 12/1/2023    Procedure: STERNOTOMY, REDO;  Surgeon: Yuri Washington MD;  [facility];  Service: Cardiovascular;  Laterality: N/A;    VALVULOPLASTY, MITRAL VALVE N/A 12/1/2023    Procedure: VALVULOPLASTY, MITRAL VALVE;  Surgeon: Yuri Washington MD;  [facility];  Service: Cardiovascular;  Laterality: N/A;       Review of patient's allergies indicates:  No Known Allergies  Current Facility-Administered Medications   Medication Frequency    amiodarone tablet 400 mg Daily    amitriptyline tablet 25 mg Once per day on Monday Wednesday Friday    atorvastatin tablet 40 mg QHS    dextrose 50% injection 12.5 g PRN    dextrose 50% injection 25 g PRN    glucagon (human recombinant) injection 1 mg PRN    glucose chewable tablet 16 g PRN    glucose chewable tablet 24 g PRN    guaiFENesin 12 hr tablet 600 mg BID    insulin aspart U-100 pen 0-5 Units QID (AC + HS) PRN    mupirocin 2 % ointment BID    potassium, sodium phosphates 280-160-250 mg packet 2 packet BID    pregabalin capsule 100 mg Every other day    sodium chloride 0.9% bolus 250 mL 250 mL Once    venlafaxine tablet 37.5 mg Daily    [START ON 4/4/2025] warfarin (COUMADIN) split tablet 1.25 mg Every Mon, Wed, Fri    warfarin (COUMADIN) tablet 2.5 mg Every Tues, Thurs, Sat, Sun     Family History    None       Tobacco Use    Smoking status: Former     Current packs/day: 0.50     Types: Cigarettes    Smokeless tobacco: Not on file   Substance and Sexual Activity    Alcohol use: Yes     Comment: rarely    Drug use: No    Sexual activity: Not Currently     Partners: Female     Review of Systems   Constitutional:  Positive for fatigue.   HENT:  Positive for congestion.    Respiratory:  Negative for shortness  of breath.    Cardiovascular:  Negative for chest pain.   Gastrointestinal:  Positive for diarrhea. Negative for nausea and vomiting.   Neurological:  Positive for syncope.     Objective:     Vital Signs (Most Recent):  Temp: 98.8 °F (37.1 °C) (04/03/25 1112)  Pulse: 83 (04/03/25 1112)  Resp: 18 (04/03/25 0730)  BP: (!) 75/55 (04/03/25 1112)  SpO2: 98 % (04/03/25 0730) Vital Signs (24h Range):  Temp:  [98.1 °F (36.7 °C)-98.9 °F (37.2 °C)] 98.8 °F (37.1 °C)  Pulse:  [] 83  Resp:  [13-22] 18  SpO2:  [95 %-100 %] 98 %  BP: ()/(0-82) 75/55     Weight: 80.8 kg (178 lb 2.1 oz) (04/02/25 2115)  Body mass index is 23.5 kg/m².  Body surface area is 2.04 meters squared.    I/O last 3 completed shifts:  In: 360 [P.O.:360]  Out: 100 [Urine:100]     Physical Exam  Vitals and nursing note reviewed.   Constitutional:       General: He is not in acute distress.  HENT:      Head: Normocephalic.   Eyes:      Conjunctiva/sclera: Conjunctivae normal.   Cardiovascular:      Rate and Rhythm: Normal rate.      Comments: LVAD hum  Pulmonary:      Effort: Pulmonary effort is normal. No respiratory distress.      Breath sounds: No wheezing or rales.   Abdominal:      General: There is no distension.      Palpations: Abdomen is soft.   Musculoskeletal:      Right lower leg: No edema.      Left lower leg: No edema.   Skin:     General: Skin is warm and dry.      Coloration: Skin is not jaundiced.   Neurological:      General: No focal deficit present.      Mental Status: He is alert.          Significant Labs:  CBC:   Recent Labs   Lab 04/03/25  0321   WBC 5.02   RBC 3.39*   HGB 8.6*   HCT 29.2*      MCV 86   MCH 25.4*   MCHC 29.5*     CMP:   Recent Labs   Lab 04/02/25  1402 04/03/25  0321   CALCIUM 8.6* 8.4*   ALBUMIN 2.5*  --     136   K 4.2 4.2   CO2 25 22*    106   BUN 20 23   CREATININE 4.5* 5.2*   ALKPHOS 105  --    ALT 27  --    AST 25  --    BILITOT 0.4  --      All labs within the past 24 hours have been  reviewed.

## 2025-04-03 NOTE — CONSULTS
[facility] - Cardiac Intensive Care  Adult Nutrition  Consult Note    SUMMARY     Recommendations    1. Continue cardiac diet as tolerated     - please continue to document PO % intake via flowsheets     2. Recommend novasource strawberry BID   3. Encourage good intake    4. RD to monitor weight, labs, intake    Goals:   1. % nutritional needs met with diet during admission     2. Maintain weight during admission  Nutrition Goal Status: new  Communication of RD Recs: other (comment) (POC)    Nutrition Discharge Planning     Nutrition Discharge Planning: Therapeutic diet (comments), Oral supplement regimen (comments)  Therapeutic diet (comments): diabetic, cardiac, renal diet  Oral supplement regimen (comments): novasource or nepro    Assessment and Plan    Nutrition Problem  Increased nutrient needs    Related to (etiology):   Increased demand for protein/calorie    Signs and Symptoms (as evidenced by):   HD treatment     Interventions/Recommendations (treatment strategy):  Collaboration with other providers  ONS    Nutrition Diagnosis Status:   New    Malnutrition Assessment    NFPE at follow up.     Reason for Assessment    Reason For Assessment: consult (nutritional assessment)  Diagnosis: other (see comments) (syncope)  General Information Comments: Pt admitted with syncope. PMHx: HFrEF 2/2 ICM s/p HM3 as DT (12/1/2023), ESRD on HD (MWF), debility, CAD, DM 2, hypoglycemia, MI, PAF. No recent surgical hx. LVAD in place. No edema noted. No wounds noted. No GI s/s - BM: 4/2. Pt's guest reported pt was having nausea, vomiting, and diarrhea. Pt having a good intake - PO: 25-50%. Pt's breakfast tray was late - guest got food from cafeteria. Pt also did not receive tray for lunch - call center was called and notified. Wt fluctuations - suspected fluid. Pt on way to bathroom with assistance during visit - NFPE at follow up.    Nutrition/Diet History    Nutrition Intake History: 3 meals and snacks  Spiritual,  "Cultural Beliefs, Mandaeism Practices, Values that Affect Care: no  Food Allergies: NKFA    Nutrition Related Social Determinants of Health: SDOH: Adequate food in home environment    Food Insecurity: No Food Insecurity (3/11/2025)    Hunger Vital Sign     Worried About Running Out of Food in the Last Year: Never true     Ran Out of Food in the Last Year: Never true     Anthropometrics    Height: 6' 1" (185.4 cm)  Height (inches): 73 in  Weight: 80.8 kg (178 lb 2.1 oz)  Weight (lb): 178.13 lb  Weight Method: Standard Scale  Ideal Body Weight (IBW), Male: 184 lb  % Ideal Body Weight, Male (lb): 96.81 %  BMI (Calculated): 23.5  BMI Grade: 18.5-24.9 - normal  Usual Body Weight (UBW), k kg  % Usual Body Weight: 101.21    Lab/Procedures/Meds    Pertinent Labs Reviewed: reviewed  Pertinent Labs Comments: H/H 8.6/29.2 low, creatinine 5.2 high, Ca 8.4 low, P 1.9 low, albumin 2.5 low, CRP 65.7 high  Pertinent Medications Reviewed: reviewed  Pertinent Medications Comments: amiodarone, amitriptyline, atorvastatin, guaifenesin, potassium sodium phosphates, pregabalin, venlafaxine, warfarin    Estimated/Assessed Needs    Weight Used For Calorie Calculations: 80.8 kg (178 lb 2.1 oz)  Energy Calorie Requirements (kcal): 9093-3540 kcal  Energy Need Method: Kcal/kg (25-35 kcal/kg)     Weight Used For Protein Calculations: 80.8 kg (178 lb 2.1 oz)        RDA Method (mL):   CHO Requirement: 253-354 g    Nutrition Prescription Ordered    Current Diet Order: cardiac    Evaluation of Received Nutrient/Fluid Intake    Energy Calories Required: not meeting needs  Protein Required: not meeting needs  Tolerance: tolerating  % Intake of Estimated Energy Needs: 25 - 50 %  % Meal Intake: 25 - 50 %    Nutrition Risk    Level of Risk/Frequency of Follow-up: moderate - high     Monitor and Evaluation    Monitor and Evaluation: Energy intake, Food and beverage intake, Protein intake, Carbohydrate intake, Diet order, Weight, Electrolyte and " renal panel, Gastrointestinal profile, Glucose/endocrine profile, Inflammatory profile, Lipid profile, Nutrition focused physical findings     Nutrition Follow-Up    RD Follow-up?: Yes

## 2025-04-03 NOTE — ASSESSMENT & PLAN NOTE
- Admitted for syncopal episode follwing HD.   - Implant Date: 12/4/2023  - Speed: 5000  - Low Flow Events: flows dropped to 2.4 at 10 am while working with therapy, improved when sat back down.   - Interval History was obtained from team member during rounding today.  - VAD/MAKAYLA teaching was not performed with patient.  - Mobilization/Physical Therapy is ongoing.  - Anticoagulation: Coumadin   - INR Goal: 3.3  - BUN: 23  - Creat: 5.2  - LDH: 352  - suspect LFA due to hypovolemia, IVF given.     More than 50 percent of the care dominated counseling and coordinating care with different team members. The VAD was interrogated. All these findings are documented in the note above.

## 2025-04-03 NOTE — ASSESSMENT & PLAN NOTE
Procedure: Device Interrogation Including analysis of device parameters  Current Settings: Ventricular Assist Device  Review of device function is stable      4/2/2025     9:26 PM 3/20/2025     1:38 PM 3/12/2025     3:01 PM 3/12/2025    12:00 PM 3/12/2025     9:15 AM 3/12/2025     4:30 AM 3/12/2025    12:12 AM   TXP LVAD INTERROGATIONS   Type HeartMate3  HeartMate3 HeartMate3 HeartMate3 HeartMate3 HeartMate3   Flow 2.8  3.8 3.8 3.7 3.8 3.4   Speed 5000  5000 5000 5000 5000 5000   PI 9.2  4.3 4.6  4.5 7.4   Power ([place]) 3.5  3.7 3.6  3.3 3.4   LSL 4600  4600 4600  4600 4600   Pulsatility Pulse No Pulse    Intermittent pulse Intermittent pulse

## 2025-04-03 NOTE — CONSULTS
[facility] - Cardiac Intensive Care  Endocrinology  Diabetes Consult Note    Consult Requested by: Brayan Ocampo MD   Reason for admit: <principal problem not specified>    HISTORY OF PRESENT ILLNESS:  Reason for Consult: Management of T2DM, Hyperglycemia      Surgical Procedure and Date: HM3 placement as DT (23)      Diabetes diagnosis year: several years ago     Home Diabetes Medications:   Currently not taking meds       How often checking glucose at home? Once daily in the AM    BG readings on regimen: 70-130s  Hypoglycemia on the regimen?  No  Missed doses on regimen?  No     Diabetes Complications include:     Hypoglycemia , Diabetic nephropathy  , Diabetic chronic kidney disease     , and Diabetic peripheral neuropathy      Complicating diabetes co morbidities:   CHF and CKD        HPI: 65 y/o AAM w/ PMHx of stage D HFrEF 2/2 ICM s/p HM3 as DT (2023), ESRD on HD (MWF), and debility who presented to hospital as transfer from ED in [place] for syncope. [place] [facility] [place] for falls.Endocrine consulted to manage hyperglycemia in the inpatient setting.     Lab Results   Component Value Date    HGBA1C 6.5 (H) 03/10/2025         Interval HPI:   Overnight events: No acute events overnight. Patient in room [id number] A. Blood glucose stable. BG at goal on current insulin regimen (SSI ). Steroid use- None.    Renal function- Abnormal - Creatinine 5.2   Vasopressors-  None       Endocrine will continue to follow and manage insulin orders inpatient.         Diet Heart Healthy Standard Tray     Eatin%  Nausea: No  Hypoglycemia and intervention: No  Fever: No  TPN and/or TF: No    PMH, PSH, FH, SH updated and reviewed     ROS:  Review of Systems   Constitutional:  Negative for unexpected weight change.   Eyes:  Negative for visual disturbance.   Respiratory:  Negative for cough.    Cardiovascular:  Negative for chest pain.   Gastrointestinal:  Negative for  "nausea and vomiting.   Endocrine: Negative for polydipsia and polyuria.   Musculoskeletal:  Negative for back pain.   Skin:  Negative for rash.   Neurological:  Negative for syncope.   Psychiatric/Behavioral:  Negative for agitation and dysphoric mood.        Current Medications and/or Treatments Impacting Glycemic Control  Immunotherapy:    Immunosuppressants       None          Steroids:   Hormones (From admission, onward)      None          Pressors:    Autonomic Drugs (From admission, onward)      None          Hyperglycemia/Diabetes Medications:   Antihyperglycemics (From admission, onward)      Start     Stop Route Frequency Ordered    04/02/25 2234  insulin aspart U-100 pen 0-5 Units         -- SubQ Before meals & nightly PRN 04/02/25 2134             PHYSICAL EXAMINATION:  Vitals:    04/03/25 1112   BP: (!) 75/55   Pulse: 83   Resp:    Temp: 98.8 °F (37.1 °C)     Body mass index is 23.5 kg/m².     Physical Exam  Constitutional:       Appearance: He is well-developed.   HENT:      Head: Normocephalic.   Eyes:      Conjunctiva/sclera: Conjunctivae normal.   Pulmonary:      Effort: Pulmonary effort is normal.   Musculoskeletal:         General: Normal range of motion.   Skin:     General: Skin is warm.      Findings: No rash.   Neurological:      Mental Status: He is alert and oriented to person, place, and time.            Labs Reviewed and Include   Recent Labs   Lab 04/02/25  1402 04/03/25  0321   CALCIUM 8.6* 8.4*   ALBUMIN 2.5*  --     136   K 4.2 4.2   CO2 25 22*    106   BUN 20 23   CREATININE 4.5* 5.2*   ALKPHOS 105  --    ALT 27  --    AST 25  --    BILITOT 0.4  --      Lab Results   Component Value Date    WBC 5.02 04/03/2025    HGB 8.6 (L) 04/03/2025    HCT 29.2 (L) 04/03/2025    MCV 86 04/03/2025     04/03/2025     No results for input(s): "TSH", "FREET4" in the last 168 hours.  Lab Results   Component Value Date    HGBA1C 6.5 (H) 03/10/2025       Nutritional status:   Body mass " index is 23.5 kg/m².  Lab Results   Component Value Date    ALBUMIN 2.5 (L) 04/02/2025    ALBUMIN 2.5 (L) 04/01/2025    ALBUMIN 2.5 (L) 03/20/2025     Lab Results   Component Value Date    PREALBUMIN 24 03/20/2025    PREALBUMIN 28 01/30/2025    PREALBUMIN 25 12/19/2024       Estimated Creatinine Clearance: 16.6 mL/min (A) (based on SCr of 5.2 mg/dL (H)).    Accu-Checks  Recent Labs     04/02/25  2145 04/03/25  0723 04/03/25  1111   POCTGLUCOSE 121* 111* 134*        ASSESSMENT and PLAN    Cardiac/Vascular  LVAD (left ventricular assist device) present  Managed per primary team  Avoid hypoglycemia        Endocrine  Type 2 diabetes mellitus without complication, without long-term current use of insulin  Endocrinology consulted for BG management.   BG goal 140-180    - Novolog (Insulin Aspart) prn for BG excursions LDC SSI (150/50)  - BG checks AC/HS  - Hypoglycemia protocol in place    ** Please notify Endocrine for any change and/or advance in diet**  ** Please call Endocrine for any BG related issues **    Discharge Planning:   TBD. Please notify endocrinology prior to discharge.        Other  Falls  Managed per primary team  Avoid hypoglycemia            Plan discussed with patient, family, and RN at bedside.        Erasmo Parrish, DNP, FNP  Endocrinology  [facility] - Cardiac Intensive Care

## 2025-04-03 NOTE — SUBJECTIVE & OBJECTIVE
Interval HPI:   Overnight events: No acute events overnight. Patient in room [id number] A. Blood glucose stable. BG at goal on current insulin regimen (SSI ). Steroid use- None.    Renal function- Abnormal - Creatinine 5.2   Vasopressors-  None       Endocrine will continue to follow and manage insulin orders inpatient.         Diet Heart Healthy Standard Tray     Eatin%  Nausea: No  Hypoglycemia and intervention: No  Fever: No  TPN and/or TF: No    PMH, PSH, FH, SH updated and reviewed     ROS:  Review of Systems   Constitutional:  Negative for unexpected weight change.   Eyes:  Negative for visual disturbance.   Respiratory:  Negative for cough.    Cardiovascular:  Negative for chest pain.   Gastrointestinal:  Negative for nausea and vomiting.   Endocrine: Negative for polydipsia and polyuria.   Musculoskeletal:  Negative for back pain.   Skin:  Negative for rash.   Neurological:  Negative for syncope.   Psychiatric/Behavioral:  Negative for agitation and dysphoric mood.        Current Medications and/or Treatments Impacting Glycemic Control  Immunotherapy:    Immunosuppressants       None          Steroids:   Hormones (From admission, onward)      None          Pressors:    Autonomic Drugs (From admission, onward)      None          Hyperglycemia/Diabetes Medications:   Antihyperglycemics (From admission, onward)      Start     Stop Route Frequency Ordered    25 2234  insulin aspart U-100 pen 0-5 Units         -- SubQ Before meals & nightly PRN 25 2134             PHYSICAL EXAMINATION:  Vitals:    25 1112   BP: (!) 75/55   Pulse: 83   Resp:    Temp: 98.8 °F (37.1 °C)     Body mass index is 23.5 kg/m².     Physical Exam  Constitutional:       Appearance: He is well-developed.   HENT:      Head: Normocephalic.   Eyes:      Conjunctiva/sclera: Conjunctivae normal.   Pulmonary:      Effort: Pulmonary effort is normal.   Musculoskeletal:         General: Normal range of motion.    Skin:     General: Skin is warm.      Findings: No rash.   Neurological:      Mental Status: He is alert and oriented to person, place, and time.

## 2025-04-03 NOTE — ASSESSMENT & PLAN NOTE
64 year old male hx of ESRD on HD MWF. Last HD Friday 3/7/25. Nephrology consulted for management of ESRD.      Nephrology History  -Outpatient HD unit: [facility] [place]  -Nephrologist: ?  -HD tx days: MWF  -HD prescription: 3 hrs, F160, 350/800  -Last HD tx: 4/2/25  -HD access: R CVC  -HD modality: iHD  -Residual urine: minimal  -EDW:  81 kg, likely needs to be adjusted up     Plan/Recommendations  ESRD on HD:  -No need for emergent RRT, electrolytes stable. Plan for HD tomorrow to resume MWF schedule/  -Patient with fatigue, syncopal episode after HD yesterday. LFA to LVAD during syncopal episode. This AM had LFA and dizziness when standing while working with PT. Reports he started taking megestrol last month for appetite stimulation and feels he may be gaining weight. EDW likely needs to be adjusted up. Encourage PO intake today for hydration, adjust UF with HD tomorrow. Will follow up on echo as well.   -Will continue iHD thrice weekly unless emergent indication arises  -Strict I&Os  -Pre & post HD weight  Anemia in ESRD  -Hgb goal 10-11, hgb 8.6, will trend  -Transfuse for hgb < 7  Mineral Bone Disease in ESRD  -Phos low today, repletion ordered. Hold phos binders for now.   -Renal diet if not NPO

## 2025-04-04 ENCOUNTER — PATIENT MESSAGE (OUTPATIENT)
Dept: TRANSPLANT | Facility: CLINIC | Age: 65
End: 2025-04-04
Payer: MEDICAID

## 2025-04-04 ENCOUNTER — TELEPHONE (OUTPATIENT)
Dept: TRANSPLANT | Facility: CLINIC | Age: 65
End: 2025-04-04
Payer: MEDICAID

## 2025-04-04 VITALS
BODY MASS INDEX: 23.99 KG/M2 | OXYGEN SATURATION: 95 % | RESPIRATION RATE: 17 BRPM | WEIGHT: 181 LBS | SYSTOLIC BLOOD PRESSURE: 70 MMHG | HEIGHT: 73 IN | TEMPERATURE: 98 F | HEART RATE: 78 BPM

## 2025-04-04 LAB
ABSOLUTE EOSINOPHIL (OHS): 0.2 K/UL
ABSOLUTE MONOCYTE (OHS): 0.34 K/UL (ref 0.3–1)
ABSOLUTE NEUTROPHIL COUNT (OHS): 3.77 K/UL (ref 1.8–7.7)
ALBUMIN SERPL BCP-MCNC: 2.3 G/DL (ref 3.5–5.2)
ALP SERPL-CCNC: 112 UNIT/L (ref 40–150)
ALT SERPL W/O P-5'-P-CCNC: 30 UNIT/L (ref 10–44)
ANION GAP (OHS): 10 MMOL/L (ref 8–16)
APTT PPP: 49 SECONDS (ref 21–32)
AST SERPL-CCNC: 29 UNIT/L (ref 11–45)
BASOPHILS # BLD AUTO: 0.03 K/UL
BASOPHILS NFR BLD AUTO: 0.6 %
BILIRUB DIRECT SERPL-MCNC: 0.2 MG/DL (ref 0.1–0.3)
BILIRUB SERPL-MCNC: 0.4 MG/DL (ref 0.1–1)
BNP SERPL-MCNC: 561 PG/ML (ref 0–99)
BUN SERPL-MCNC: 34 MG/DL (ref 8–23)
CALCIUM SERPL-MCNC: 8.2 MG/DL (ref 8.7–10.5)
CHLORIDE SERPL-SCNC: 105 MMOL/L (ref 95–110)
CO2 SERPL-SCNC: 21 MMOL/L (ref 23–29)
CREAT SERPL-MCNC: 6.5 MG/DL (ref 0.5–1.4)
CRP SERPL-MCNC: 46.6 MG/L
ERYTHROCYTE [DISTWIDTH] IN BLOOD BY AUTOMATED COUNT: 17.1 % (ref 11.5–14.5)
GFR SERPLBLD CREATININE-BSD FMLA CKD-EPI: 9 ML/MIN/1.73/M2
GLUCOSE SERPL-MCNC: 101 MG/DL (ref 70–110)
HCT VFR BLD AUTO: 31.1 % (ref 40–54)
HGB BLD-MCNC: 9.1 GM/DL (ref 14–18)
IMM GRANULOCYTES # BLD AUTO: 0.03 K/UL (ref 0–0.04)
IMM GRANULOCYTES NFR BLD AUTO: 0.6 % (ref 0–0.5)
INR PPP: 3.5 (ref 0.8–1.2)
LDH SERPL-CCNC: 371 U/L (ref 110–260)
LYMPHOCYTES # BLD AUTO: 0.79 K/UL (ref 1–4.8)
MAGNESIUM SERPL-MCNC: 1.8 MG/DL (ref 1.6–2.6)
MCH RBC QN AUTO: 25.1 PG (ref 27–31)
MCHC RBC AUTO-ENTMCNC: 29.3 G/DL (ref 32–36)
MCV RBC AUTO: 86 FL (ref 82–98)
NUCLEATED RBC (/100WBC) (OHS): 0 /100 WBC
PHOSPHATE SERPL-MCNC: 2.7 MG/DL (ref 2.7–4.5)
PLATELET # BLD AUTO: 219 K/UL (ref 150–450)
PMV BLD AUTO: 9.9 FL (ref 9.2–12.9)
POCT GLUCOSE: 113 MG/DL (ref 70–110)
POCT GLUCOSE: 139 MG/DL (ref 70–110)
POCT GLUCOSE: 211 MG/DL (ref 70–110)
POTASSIUM SERPL-SCNC: 4.7 MMOL/L (ref 3.5–5.1)
PREALB SERPL-MCNC: 21 MG/DL (ref 20–43)
PROT SERPL-MCNC: 7.1 GM/DL (ref 6–8.4)
PROTHROMBIN TIME: 34.6 SECONDS (ref 9–12.5)
RBC # BLD AUTO: 3.62 M/UL (ref 4.6–6.2)
RELATIVE EOSINOPHIL (OHS): 3.9 %
RELATIVE LYMPHOCYTE (OHS): 15.3 % (ref 18–48)
RELATIVE MONOCYTE (OHS): 6.6 % (ref 4–15)
RELATIVE NEUTROPHIL (OHS): 73 % (ref 38–73)
SODIUM SERPL-SCNC: 136 MMOL/L (ref 136–145)
WBC # BLD AUTO: 5.16 K/UL (ref 3.9–12.7)

## 2025-04-04 PROCEDURE — 80100014 HC HEMODIALYSIS 1:1

## 2025-04-04 PROCEDURE — 93750 INTERROGATION VAD IN PERSON: CPT | Mod: ,,, | Performed by: INTERNAL MEDICINE

## 2025-04-04 PROCEDURE — 25000003 PHARM REV CODE 250: Performed by: NURSE PRACTITIONER

## 2025-04-04 PROCEDURE — 25000003 PHARM REV CODE 250: Performed by: STUDENT IN AN ORGANIZED HEALTH CARE EDUCATION/TRAINING PROGRAM

## 2025-04-04 PROCEDURE — 86140 C-REACTIVE PROTEIN: CPT | Performed by: STUDENT IN AN ORGANIZED HEALTH CARE EDUCATION/TRAINING PROGRAM

## 2025-04-04 PROCEDURE — 84134 ASSAY OF PREALBUMIN: CPT | Performed by: STUDENT IN AN ORGANIZED HEALTH CARE EDUCATION/TRAINING PROGRAM

## 2025-04-04 PROCEDURE — 84100 ASSAY OF PHOSPHORUS: CPT | Performed by: STUDENT IN AN ORGANIZED HEALTH CARE EDUCATION/TRAINING PROGRAM

## 2025-04-04 PROCEDURE — 83615 LACTATE (LD) (LDH) ENZYME: CPT | Performed by: STUDENT IN AN ORGANIZED HEALTH CARE EDUCATION/TRAINING PROGRAM

## 2025-04-04 PROCEDURE — 85025 COMPLETE CBC W/AUTO DIFF WBC: CPT | Performed by: STUDENT IN AN ORGANIZED HEALTH CARE EDUCATION/TRAINING PROGRAM

## 2025-04-04 PROCEDURE — G0378 HOSPITAL OBSERVATION PER HR: HCPCS

## 2025-04-04 PROCEDURE — 82248 BILIRUBIN DIRECT: CPT | Performed by: STUDENT IN AN ORGANIZED HEALTH CARE EDUCATION/TRAINING PROGRAM

## 2025-04-04 PROCEDURE — 96376 TX/PRO/DX INJ SAME DRUG ADON: CPT

## 2025-04-04 PROCEDURE — 83880 ASSAY OF NATRIURETIC PEPTIDE: CPT | Performed by: STUDENT IN AN ORGANIZED HEALTH CARE EDUCATION/TRAINING PROGRAM

## 2025-04-04 PROCEDURE — 63600175 PHARM REV CODE 636 W HCPCS: Performed by: STUDENT IN AN ORGANIZED HEALTH CARE EDUCATION/TRAINING PROGRAM

## 2025-04-04 PROCEDURE — 80053 COMPREHEN METABOLIC PANEL: CPT | Performed by: STUDENT IN AN ORGANIZED HEALTH CARE EDUCATION/TRAINING PROGRAM

## 2025-04-04 PROCEDURE — 96361 HYDRATE IV INFUSION ADD-ON: CPT

## 2025-04-04 PROCEDURE — 83735 ASSAY OF MAGNESIUM: CPT | Performed by: STUDENT IN AN ORGANIZED HEALTH CARE EDUCATION/TRAINING PROGRAM

## 2025-04-04 PROCEDURE — G0257 UNSCHED DIALYSIS ESRD PT HOS: HCPCS

## 2025-04-04 PROCEDURE — 85610 PROTHROMBIN TIME: CPT | Performed by: STUDENT IN AN ORGANIZED HEALTH CARE EDUCATION/TRAINING PROGRAM

## 2025-04-04 PROCEDURE — 96372 THER/PROPH/DIAG INJ SC/IM: CPT | Performed by: STUDENT IN AN ORGANIZED HEALTH CARE EDUCATION/TRAINING PROGRAM

## 2025-04-04 PROCEDURE — 36415 COLL VENOUS BLD VENIPUNCTURE: CPT | Performed by: STUDENT IN AN ORGANIZED HEALTH CARE EDUCATION/TRAINING PROGRAM

## 2025-04-04 PROCEDURE — 25000003 PHARM REV CODE 250: Performed by: PHYSICIAN ASSISTANT

## 2025-04-04 PROCEDURE — 99233 SBSQ HOSP IP/OBS HIGH 50: CPT | Mod: ,,, | Performed by: NURSE PRACTITIONER

## 2025-04-04 PROCEDURE — 27000248 HC VAD-ADDITIONAL DAY

## 2025-04-04 PROCEDURE — 94799 UNLISTED PULMONARY SVC/PX: CPT

## 2025-04-04 PROCEDURE — 85730 THROMBOPLASTIN TIME PARTIAL: CPT | Performed by: STUDENT IN AN ORGANIZED HEALTH CARE EDUCATION/TRAINING PROGRAM

## 2025-04-04 PROCEDURE — 99214 OFFICE O/P EST MOD 30 MIN: CPT | Mod: ,,, | Performed by: NURSE PRACTITIONER

## 2025-04-04 RX ORDER — HYDRALAZINE HYDROCHLORIDE 20 MG/ML
10 INJECTION INTRAMUSCULAR; INTRAVENOUS ONCE
Status: COMPLETED | OUTPATIENT
Start: 2025-04-04 | End: 2025-04-04

## 2025-04-04 RX ORDER — SODIUM CHLORIDE 9 MG/ML
INJECTION, SOLUTION INTRAVENOUS ONCE
Status: COMPLETED | OUTPATIENT
Start: 2025-04-04 | End: 2025-04-04

## 2025-04-04 RX ORDER — WARFARIN 2.5 MG/1
TABLET ORAL
Qty: 45 TABLET | Refills: 11 | Status: SHIPPED | OUTPATIENT
Start: 2025-04-04

## 2025-04-04 RX ADMIN — AMITRIPTYLINE HYDROCHLORIDE 25 MG: 25 TABLET, FILM COATED ORAL at 08:04

## 2025-04-04 RX ADMIN — AMIODARONE HYDROCHLORIDE 400 MG: 200 TABLET ORAL at 08:04

## 2025-04-04 RX ADMIN — INSULIN ASPART 2 UNITS: 100 INJECTION, SOLUTION INTRAVENOUS; SUBCUTANEOUS at 12:04

## 2025-04-04 RX ADMIN — GUAIFENESIN 600 MG: 600 TABLET, EXTENDED RELEASE ORAL at 08:04

## 2025-04-04 RX ADMIN — HYDRALAZINE HYDROCHLORIDE 10 MG: 20 INJECTION, SOLUTION INTRAMUSCULAR; INTRAVENOUS at 06:04

## 2025-04-04 RX ADMIN — HYDRALAZINE HYDROCHLORIDE 10 MG: 20 INJECTION, SOLUTION INTRAMUSCULAR; INTRAVENOUS at 12:04

## 2025-04-04 RX ADMIN — SODIUM CHLORIDE: 9 INJECTION, SOLUTION INTRAVENOUS at 02:04

## 2025-04-04 RX ADMIN — VENLAFAXINE 37.5 MG: 37.5 TABLET ORAL at 08:04

## 2025-04-04 RX ADMIN — WARFARIN SODIUM 1.25 MG: 2.5 TABLET ORAL at 04:04

## 2025-04-04 RX ADMIN — MUPIROCIN: 20 OINTMENT TOPICAL at 08:04

## 2025-04-04 NOTE — PROGRESS NOTES
DISCHARGE NOTE:    Radha Abbott is a 64 y.o. male s/p HM3 lvad from 12.1.2023 admitted for evaluation of syncope is preparing for discharge today.     Past Medical History:   Diagnosis Date    Aspiration pneumonia of both lungs 06/17/2024    CAD (coronary artery disease)     CHF (congestive heart failure)     Delirium 06/16/2024    Diabetes mellitus     HFrEF (heart failure with reduced ejection fraction)     Hypoglycemia 06/12/2024    ICD (implantable cardioverter-defibrillator) in place     LVAD (left ventricular assist device) present 12/01/2023    MI, old     PAF (paroxysmal atrial fibrillation) 10/11/2023    Stage 4 chronic kidney disease 02/15/2024    Type 2 diabetes mellitus without complication, without long-term current use of insulin 10/07/2023       Hospital Course: During his admission Mr. Abbott was assessed for hypovolemia. Plan for less fluid removal with iHD.     His inr today was 3.5. He was administered warfarin 2.5mg yesterday. Plan to decrease his home warfarin dose to 1.25mg orally daily (expecting increase in volume with iHD reduction in removal).     Pharmacy Interventions/Recommendations:  1) INR Goal: 2-3    2) Antiplatelet Agents: None    3) Heparin Bridging:  UFH     4) INR Follow-Up/Discharge Needs:  Thursday with [facility]     See list of discharge medication for dosing instructions.     Radha Abbott and his caregiver verbalized their understanding and had the opportunity to ask questions.      Discharge Medications:     Medication List        CHANGE how you take these medications      warfarin 2.5 MG tablet  Commonly known as: COUMADIN  Take 1.25 mg orally daily  What changed: additional instructions            CONTINUE taking these medications      amiodarone 400 MG tablet  Commonly known as: PACERONE  Take 1 tablet (400 mg total) by mouth once daily.     amitriptyline 25 MG tablet  Commonly known as: ELAVIL  Take 1 tablet (25 mg total) by mouth 3 (three) times a week.      atorvastatin 40 MG tablet  Commonly known as: LIPITOR  Take 1 tablet (40 mg total) by mouth every evening.     Fish OiL 1,000 (120-180) mg Cap  Generic drug: omega 3-dha-epa-fish oil     megestroL 40 MG Tab  Commonly known as: MEGACE     miscellaneous medical supply Kit  by Apply Externally route 2 (two) times a day. Use twice daily at 8 AM and 3 PM and record the value in MyChart as directed.     pregabalin 100 MG capsule  Commonly known as: LYRICA     sevelamer carbonate 800 mg Tab  Commonly known as: RENVELA     traZODone 50 MG tablet  Commonly known as: DESYREL  Take 0.5 tablets (25 mg total) by mouth every evening.     venlafaxine 37.5 MG Tab  Commonly known as: EFFEXOR  Take 1 tablet (37.5 mg total) by mouth once daily.     vitamin D 1000 units Tab  Commonly known as: VITAMIN D3  Take 1 tablet by mouth once daily               Where to Get Your Medications        These medications were sent to [facility]  [address] [place], [place] [address] [address]      Hours: Always Open Phone: [phone]   warfarin 2.5 MG tablet

## 2025-04-04 NOTE — PROGRESS NOTES
04/04/25 0751 04/04/25 0757   Vital Signs   BP (!) 101/54 (!) 80/0   MAP (mmHg) 71  --    BP Location Left arm Left arm   BP Method Automatic Doppler   Patient Position Sitting Sitting     Pt asymptomatic. Erasto Larose, ALETHA aware. No LFA noted.

## 2025-04-04 NOTE — DISCHARGE INSTRUCTIONS
PT and OT at [facility] ([phone])    Your OT evaluation will be scheduled on Tuesday, April 8, 2025 prior to your 12:45 pm PT treatment, per [facility] at [facility]    [facility] ([phone])    Resume care on: Monday, April 7, 2025 regular chair time (6:30 am)

## 2025-04-04 NOTE — SUBJECTIVE & OBJECTIVE
Interval History: No more pre/syncopal episodes reported. Pre HD standing weight 82.1 kg. HD today.     Review of patient's allergies indicates:  No Known Allergies  Current Facility-Administered Medications   Medication Frequency    0.9% NaCl infusion Once    amiodarone tablet 400 mg Daily    amitriptyline tablet 25 mg Once per day on Monday Wednesday Friday    atorvastatin tablet 40 mg QHS    dextrose 50% injection 12.5 g PRN    dextrose 50% injection 25 g PRN    glucagon (human recombinant) injection 1 mg PRN    glucose chewable tablet 16 g PRN    glucose chewable tablet 24 g PRN    guaiFENesin 12 hr tablet 600 mg BID    insulin aspart U-100 pen 0-5 Units QID (AC + HS) PRN    mupirocin 2 % ointment BID    pregabalin capsule 100 mg Every other day    venlafaxine tablet 37.5 mg Daily    warfarin (COUMADIN) split tablet 1.25 mg Every Mon, Wed, Fri    warfarin (COUMADIN) tablet 2.5 mg Every Tues, Thurs, Sat, Sun       Objective:     Vital Signs (Most Recent):  Temp: 97.9 °F (36.6 °C) (04/04/25 1426)  Pulse: 78 (04/04/25 1437)  Resp: 18 (04/04/25 0751)  BP: (!) 124/91 (04/04/25 1437)  SpO2: 95 % (04/04/25 0751) Vital Signs (24h Range):  Temp:  [97.9 °F (36.6 °C)-99 °F (37.2 °C)] 97.9 °F (36.6 °C)  Pulse:  [78-95] 78  Resp:  [16-20] 18  SpO2:  [93 %-99 %] 95 %  BP: ()/(0-91) 124/91     Weight: 82.1 kg (181 lb) (04/04/25 1340)  Body mass index is 23.88 kg/m².  Body surface area is 2.06 meters squared.    I/O last 3 completed shifts:  In: 1162 [P.O.:1162]  Out: 100 [Urine:100]     Physical Exam  Vitals and nursing note reviewed.   Constitutional:       General: He is not in acute distress.  HENT:      Head: Normocephalic.   Eyes:      Conjunctiva/sclera: Conjunctivae normal.   Cardiovascular:      Rate and Rhythm: Normal rate.      Comments: LVAD hum  Pulmonary:      Effort: Pulmonary effort is normal. No respiratory distress.      Breath sounds: No wheezing or rales.   Abdominal:      General: There is no  distension.      Palpations: Abdomen is soft.   Musculoskeletal:      Right lower leg: No edema.      Left lower leg: No edema.   Skin:     General: Skin is warm and dry.      Coloration: Skin is not jaundiced.   Neurological:      General: No focal deficit present.      Mental Status: He is alert.          Significant Labs:  CBC:   Recent Labs   Lab 04/04/25  0648   WBC 5.16   RBC 3.62*   HGB 9.1*   HCT 31.1*      MCV 86   MCH 25.1*   MCHC 29.3*     CMP:   Recent Labs   Lab 04/04/25  0648   CALCIUM 8.2*   ALBUMIN 2.3*      K 4.7   CO2 21*      BUN 34*   CREATININE 6.5*   ALKPHOS 112   ALT 30   AST 29   BILITOT 0.4     All labs within the past 24 hours have been reviewed.

## 2025-04-04 NOTE — PROGRESS NOTES
[facility] - Cardiac Intensive [facility]  Heart Transplant  Progress Note    Patient Name: Radha Abbott  MRN: [id number]  Admission Date: 4/2/2025  Hospital Length of Stay: 1 days  Attending Physician: Brayan Ocampo MD  Primary Care Provider: Vasu Kong MD  Principal Problem:<principal problem not specified>    Subjective:   Interval History: NAOEN. Feels fine this am. No more pre/syncopal episodes. Potential DC home with higher dry weight.     Continuous Infusions:  Scheduled Meds:   amiodarone  400 mg Oral Daily    amitriptyline  25 mg Oral Once per day on Monday Wednesday Friday    atorvastatin  40 mg Oral QHS    guaiFENesin  600 mg Oral BID    mupirocin   Nasal BID    pregabalin  100 mg Oral Every other day    venlafaxine  37.5 mg Oral Daily    warfarin  1.25 mg Oral Every Mon, Wed, Fri    warfarin  2.5 mg Oral Every Tues, Thurs, Sat, Sun     PRN Meds:  Current Facility-Administered Medications:     dextrose 50%, 12.5 g, Intravenous, PRN    dextrose 50%, 25 g, Intravenous, PRN    glucagon (human recombinant), 1 mg, Intramuscular, PRN    glucose, 16 g, Oral, PRN    glucose, 24 g, Oral, PRN    insulin aspart U-100, 0-5 Units, Subcutaneous, QID (AC + HS) PRN    Review of patient's allergies indicates:  No Known Allergies  Objective:     Vital Signs (Most Recent):  Temp: 98.8 °F (37.1 °C) (04/04/25 0751)  Pulse: 85 (04/04/25 1049)  Resp: 18 (04/04/25 0751)  BP: (!) 80/0 (04/04/25 0757)  SpO2: 95 % (04/04/25 0751) Vital Signs (24h Range):  Temp:  [98.2 °F (36.8 °C)-99 °F (37.2 °C)] 98.8 °F (37.1 °C)  Pulse:  [55-95] 85  Resp:  [16-20] 18  SpO2:  [93 %-99 %] 95 %  BP: ()/(0-69) 80/0     Patient Vitals for the past 72 hrs (Last 3 readings):   Weight   04/02/25 2115 80.8 kg (178 lb 2.1 oz)     Body mass index is 23.5 kg/m².      Intake/Output Summary (Last 24 hours) at 4/4/2025 1114  Last data filed at 4/4/2025 0932  Gross per 24 hour   Intake 952 ml   Output --   Net 952 ml            Physical  Exam  Constitutional:       Appearance: Normal appearance.   Neck:      Comments: JVP not elevated  Cardiovascular:      Rate and Rhythm: Normal rate and regular rhythm.      Pulses: Normal pulses.      Heart sounds: Normal heart sounds.      Comments: LVAD hum  Pulmonary:      Effort: Pulmonary effort is normal. No respiratory distress.      Breath sounds: Normal breath sounds. No wheezing or rales.   Abdominal:      General: Abdomen is flat. There is no distension.      Palpations: Abdomen is soft.      Tenderness: There is no abdominal tenderness.   Musculoskeletal:      Cervical back: Normal range of motion and neck supple.      Right lower leg: No edema.      Left lower leg: No edema.   Skin:     General: Skin is warm and dry.   Neurological:      General: No focal deficit present.      Mental Status: He is alert and oriented to person, place, and time.   Psychiatric:         Mood and Affect: Mood normal.         Behavior: Behavior normal.            Significant Labs:  CBC:  Recent Labs   Lab 04/02/25  1402 04/03/25  0321 04/04/25  0648   WBC 5.53 5.02 5.16   RBC 3.63* 3.39* 3.62*   HGB 9.4* 8.6* 9.1*   HCT 31.3* 29.2* 31.1*    196 219   MCV 86 86 86   MCH 25.9* 25.4* 25.1*   MCHC 30.0* 29.5* 29.3*     BNP:  Recent Labs   Lab 04/01/25  0910 04/02/25  1436 04/04/25  0648   * 559* 561*     CMP:  Recent Labs   Lab 04/01/25  0910 04/02/25  1402 04/03/25  0321 04/04/25  0648   CALCIUM 8.5* 8.6* 8.4* 8.2*   ALBUMIN 2.5* 2.5*  --  2.3*    137 136 136   K 5.1 4.2 4.2 4.7   CO2 23 25 22* 21*    102 106 105   BUN 35* 20 23 34*   CREATININE 6.5* 4.5* 5.2* 6.5*   ALKPHOS 105 105  --  112   ALT 25 27  --  30   AST 22 25  --  29   BILITOT 0.5 0.4  --  0.4      Coagulation:   Recent Labs   Lab 04/02/25  1402 04/03/25  0321 04/04/25  0649   INR 2.8* 3.3* 3.5*   APTT 46.4* 47.7* 49.0*     LDH:  Recent Labs   Lab 04/03/25  0321 04/04/25  0649   * 371*     Microbiology:  Microbiology Results (last  7 days)       Procedure Component Value Units Date/Time    Blood culture [[id number]]  (Normal) Collected: 04/03/25 1036    Order Status: Completed Specimen: Blood Updated: 04/03/25 1900     Blood Culture No Growth After 6 Hours    Blood culture [[id number]]  (Normal) Collected: 04/03/25 1041    Order Status: Completed Specimen: Blood Updated: 04/03/25 1900     Blood Culture No Growth After 6 Hours            I have reviewed all pertinent labs within the past 24 hours.    Estimated Creatinine Clearance: 13.3 mL/min (A) (based on SCr of 6.5 mg/dL (H)).    Diagnostic Results:  I have reviewed all pertinent imaging results/findings within the past 24 hours.  Assessment and Plan:     No notes on file    LVAD (left ventricular assist device) present  -HeartMate 3 Implanted  12/1/2023  as DT  -Boost Coumadin, Goal INR 2.0-3.0   . Subtherapeutic today.   -Antiplatelets Not on ASA  -LDH is stable overall today. Will continue to monitor daily.  -Speed set at 5000, LSL 4600 rpm  -Interrogation notable for no events  -Not listed for OHTx    Procedure: Device Interrogation Including analysis of device parameters  Current Settings: Ventricular Assist Device  INR supratherapeutic  Review of device function is stable      4/4/2025     8:08 AM 4/4/2025     4:17 AM 4/4/2025    12:35 AM 4/3/2025     8:09 PM 4/3/2025     4:31 PM 4/3/2025     1:39 PM 4/3/2025    10:55 AM   TXP LVAD INTERROGATIONS   Type HeartMate3 HeartMate3 HeartMate3 HeartMate3 HeartMate3 HeartMate3 HeartMate3   Flow 3.8 3.2 3.3 2.9 4.1 3.8 2.4    Speed 5000 5000 5000 5000 5000 5000    PI 5.1 7.8 8 7.9 5.2 5.6    Power (Carrington) 3.4 3.4 3.4 3.4 3.6 3.4    LSL 4600 4600 4600 4600      Pulsatility Pulse Intermittent pulse Intermittent pulse Intermittent pulse          Significant value           Falls  Likely in setting of debility  PT/OT ordered  Will benefit from at home therapy upon discharge    Chronic combined systolic and diastolic CHF, NYHA class 4  2/2 iCM  S/p  Heartmate III placed on 12/01/2023   On warfarin and ASA   Euvolemic/possibly hypovolemic on examination today    Anticoagulant long-term use  In setting of VAD  Goal INR 2-3    ESRD (end stage renal disease)  On MWF HD.  S/p HD yesterday with removal of 2.5 L  Nephrology on board- appreciate assistance for potential HD   Discussed need for potentially adjusted dry weight to prevent volume depletion        [place]o [place], NP  Heart Transplant  [facility] - Cardiac [facility]

## 2025-04-04 NOTE — NURSING
Bedside dialysis tx started to the right chest perm cath per orders placed by RONI Cai:    Order Questions    Question Answer   Antibiotics on HD? No   Duration of Treatment 3.5 hours   Dialyzer F160NR   Dialysate Temperature (C) 36.5   Target  mL/min   If unable to maintain flow due to inadequate vascular access patency, patient intolerance (i.e. chest pain, access discomfort) or elevated venous pressure, adjust blood flow rate to a minimum of _____mL/min 100    mL/min   K+ Potassium per Protocol   Ca++ Calcium per Protocol   Na+ Sodium per Protocol   Bicarb Bicarbonate per Protocol   Access to be used Other (please specify)   Target UF 0L   If unable to maintain this UFR due to patient intolerance (i.e. hypotension, chest pain, muscle cramping, nausea or vomiting), adjust UFR to achieve a minimum of _______ liters of UF 0   Fluid Removal Instructions maintain SBP > 90 mmHG, keep map > 65

## 2025-04-04 NOTE — PROGRESS NOTES
Visited with patient for check in. Patient has his Emergency bag with him no visible damage. Patient stated all equipment is working as intended. Patient stated no equipment needs at this time. Reminded patient to give me a call if anything changes.

## 2025-04-04 NOTE — PROGRESS NOTES
[facility] - Card[facility]  Nephrology  Progress Note    Patient Name: Radha Abbott  MRN: [id number]  Admission Date: 4/2/2025  Hospital Length of Stay: 1 days  Attending Provider: Brayan Ocampo MD   Primary Care Physician: Vasu Kong MD  Principal Problem:Falls    Subjective:     Interval History: No more pre/syncopal episodes reported. Pre HD standing weight 82.1 kg. HD today.     Review of patient's allergies indicates:  No Known Allergies  Current Facility-Administered Medications   Medication Frequency    0.9% NaCl infusion Once    amiodarone tablet 400 mg Daily    amitriptyline tablet 25 mg Once per day on Monday Wednesday Friday    atorvastatin tablet 40 mg QHS    dextrose 50% injection 12.5 g PRN    dextrose 50% injection 25 g PRN    glucagon (human recombinant) injection 1 mg PRN    glucose chewable tablet 16 g PRN    glucose chewable tablet 24 g PRN    guaiFENesin 12 hr tablet 600 mg BID    insulin aspart U-100 pen 0-5 Units QID (AC + HS) PRN    mupirocin 2 % ointment BID    pregabalin capsule 100 mg Every other day    venlafaxine tablet 37.5 mg Daily    warfarin (COUMADIN) split tablet 1.25 mg Every Mon, Wed, Fri    warfarin (COUMADIN) tablet 2.5 mg Every Tues, Thurs, Sat, Sun       Objective:     Vital Signs (Most Recent):  Temp: 97.9 °F (36.6 °C) (04/04/25 1426)  Pulse: 78 (04/04/25 1437)  Resp: 18 (04/04/25 0751)  BP: (!) 124/91 (04/04/25 1437)  SpO2: 95 % (04/04/25 0751) Vital Signs (24h Range):  Temp:  [97.9 °F (36.6 °C)-99 °F (37.2 °C)] 97.9 °F (36.6 °C)  Pulse:  [78-95] 78  Resp:  [16-20] 18  SpO2:  [93 %-99 %] 95 %  BP: ()/(0-91) 124/91     Weight: 82.1 kg (181 lb) (04/04/25 1340)  Body mass index is 23.88 kg/m².  Body surface area is 2.06 meters squared.    I/O last 3 completed shifts:  In: 1162 [P.O.:1162]  Out: 100 [Urine:100]     Physical Exam  Vitals and nursing note reviewed.   Constitutional:       General: He is not in acute distress.  HENT:      Head: Normocephalic.    Eyes:      Conjunctiva/sclera: Conjunctivae normal.   Cardiovascular:      Rate and Rhythm: Normal rate.      Comments: LVAD hum  Pulmonary:      Effort: Pulmonary effort is normal. No respiratory distress.      Breath sounds: No wheezing or rales.   Abdominal:      General: There is no distension.      Palpations: Abdomen is soft.   Musculoskeletal:      Right lower leg: No edema.      Left lower leg: No edema.   Skin:     General: Skin is warm and dry.      Coloration: Skin is not jaundiced.   Neurological:      General: No focal deficit present.      Mental Status: He is alert.          Significant Labs:  CBC:   Recent Labs   Lab 04/04/25  0648   WBC 5.16   RBC 3.62*   HGB 9.1*   HCT 31.1*      MCV 86   MCH 25.1*   MCHC 29.3*     CMP:   Recent Labs   Lab 04/04/25  0648   CALCIUM 8.2*   ALBUMIN 2.3*      K 4.7   CO2 21*      BUN 34*   CREATININE 6.5*   ALKPHOS 112   ALT 30   AST 29   BILITOT 0.4     All labs within the past 24 hours have been reviewed.     Assessment/Plan:     Renal/  ESRD (end stage renal disease)  64 year old male hx of ESRD on HD MWF. Last HD Friday 3/7/25. Nephrology consulted for management of ESRD.      Nephrology History  -Outpatient HD unit: [facility]  -Nephrologist: ?  -HD tx days: MWF  -HD prescription: 3 hrs, F160, 350/800  -Last HD tx: 4/2/25  -HD access: R CVC  -HD modality: iHD  -Residual urine: minimal  -EDW:  81 kg, likely needs to be adjusted up     Plan/Recommendations  ESRD on HD:  -Pre HD weight 82.1 kg. No more syncopal episodes. No more issues while working with PT today. Plan for HD today. Keep net even   -Obtain post HD weight and adjust EDW as outpatient.    -Will continue iHD thrice weekly unless emergent indication arises  -Strict I&Os  -Pre & post HD weight  Anemia in ESRD  -Hgb goal 10-11, hgb 8.6, will trend  -Transfuse for hgb < 7  Mineral Bone Disease in ESRD  - replete phos as needed   -Renal diet if not NPO      Other  * Falls  -management  per primary        Thank you for your consult. I will follow-up with patient. Please contact us if you have any additional questions.    Yolanda Cai DNP  Nephrology  [facility] - Card[facility]

## 2025-04-04 NOTE — PROGRESS NOTES
04/04/25 1836   Vital Signs   BP (!) 70/0   BP Location Left arm   BP Method Doppler   Patient Position Lying     Post HD. Pt asymptomatic. Pt walked 50ft in hallway with walker. Endorses no symptoms (dizziness or lightheadedness) . Dr. Lopes aware, Ok to discharge home. Pt left with spouse on wheelchair.

## 2025-04-04 NOTE — PLAN OF CARE
Problem: Adult Inpatient Plan of Care  Goal: Plan of Care Review  4/4/2025 0333 by Yanet Fish RN  Outcome: Progressing  4/4/2025 0222 by Yanet Fish RN  Outcome: Progressing  Goal: Patient-Specific Goal (Individualized)  4/4/2025 0333 by Yanet Fish RN  Outcome: Progressing  4/4/2025 0222 by Yanet Fish RN  Outcome: Progressing  Goal: Absence of Hospital-Acquired Illness or Injury  4/4/2025 0333 by Yanet Fish RN  Outcome: Progressing  4/4/2025 0222 by Yanet Fish RN  Outcome: Progressing  Goal: Optimal Comfort and Wellbeing  4/4/2025 0333 by Yanet Fish RN  Outcome: Progressing  4/4/2025 0222 by Yanet Fish RN  Outcome: Progressing  Goal: Readiness for Transition of Care  4/4/2025 0333 by Yanet Fish RN  Outcome: Progressing  4/4/2025 0222 by Yanet Fish RN  Outcome: Progressing     Problem: Diabetes Comorbidity  Goal: Blood Glucose Level Within Targeted Range  4/4/2025 0333 by Yanet Fish RN  Outcome: Progressing  4/4/2025 0222 by Yanet Fish RN  Outcome: Progressing     Problem: Infection  Goal: Absence of Infection Signs and Symptoms  4/4/2025 0333 by Yanet Fish RN  Outcome: Progressing  4/4/2025 0222 by Yanet Fish RN  Outcome: Progressing     Problem: Ventricular Assist Device  Goal: Optimal Adjustment to Device  4/4/2025 0333 by Yanet Fish RN  Outcome: Progressing  4/4/2025 0222 by Yanet Fish RN  Outcome: Progressing  Goal: Absence of Bleeding  4/4/2025 0333 by Yanet Fish RN  Outcome: Progressing  4/4/2025 0222 by Yanet Fish RN  Outcome: Progressing  Goal: Absence of Embolism Signs and Symptoms  4/4/2025 0333 by Yanet Fish RN  Outcome: Progressing  4/4/2025 0222 by Yanet Fish RN  Outcome: Progressing  Goal: Optimal Blood Flow  4/4/2025 0333 by Yanet Fish RN  Outcome: Progressing  4/4/2025 0222 by Yanet Fish, RN  Outcome: Progressing  Goal: Absence of Infection Signs and Symptoms  4/4/2025 0333 by Yanet Fish, RN  Outcome:  Progressing  4/4/2025 0222 by Yanet Fish RN  Outcome: Progressing  Goal: Effective Right-Sided Heart Function  4/4/2025 0333 by Yanet Fish RN  Outcome: Progressing  4/4/2025 0222 by Yanet Fish RN  Outcome: Progressing     Problem: Fall Injury Risk  Goal: Absence of Fall and Fall-Related Injury  4/4/2025 0333 by Yanet Fish RN  Outcome: Progressing  4/4/2025 0222 by Yanet Fish RN  Outcome: Progressing   AAOx4,O2 sats > 99% on room air. Plan of care discussed with patient. Patient has no complaints of chest pain/SOB/palpitations. Pt ambulating  with assist x 1, fall precautions in place,no falls/injuries through the shift.Discussed medications and care.Patient has no questions at this time.Pt resting comfortably with no acute distress.Call light within reach,bed at lowest position.

## 2025-04-04 NOTE — TELEPHONE ENCOUNTER
----- Message from KYAW Michel sent at 4/4/2025  3:29 PM CDT -----  Regarding: LVAD appt  Mr. Abbott is discharging today. Just want to make sure that he is on the radar for a f/u in June since he was seen in clinic on 3/20?Thanks

## 2025-04-04 NOTE — ASSESSMENT & PLAN NOTE
-HeartMate 3 Implanted  12/1/2023  as DT  -Boost Coumadin, Goal INR 2.0-3.0   . Subtherapeutic today.   -Antiplatelets Not on ASA  -LDH is stable overall today. Will continue to monitor daily.  -Speed set at 5000, LSL 4600 rpm  -Interrogation notable for no events  -Not listed for OHTx    Procedure: Device Interrogation Including analysis of device parameters  Current Settings: Ventricular Assist Device  INR supratherapeutic  Review of device function is stable      4/4/2025     8:08 AM 4/4/2025     4:17 AM 4/4/2025    12:35 AM 4/3/2025     8:09 PM 4/3/2025     4:31 PM 4/3/2025     1:39 PM 4/3/2025    10:55 AM   TXP LVAD INTERROGATIONS   Type HeartMate3 HeartMate3 HeartMate3 HeartMate3 HeartMate3 HeartMate3 HeartMate3   Flow 3.8 3.2 3.3 2.9 4.1 3.8 2.4    Speed 5000 5000 5000 5000 5000 5000    PI 5.1 7.8 8 7.9 5.2 5.6    Power (Carrington) 3.4 3.4 3.4 3.4 3.6 3.4    LSL 4600 4600 4600 4600      Pulsatility Pulse Intermittent pulse Intermittent pulse Intermittent pulse          Significant value

## 2025-04-04 NOTE — PROGRESS NOTES
Dc note     Dc orders received. F/u with Silke at [facility] ([phone]) to inquire if pt had OT ordered from prior admit. Silke confirmed that PT and OT were ordered. Inquired about OT eval. Silke stated that pt's OT eval was scheduled, and pt's family canceled due to pt not feeling well. Silke stated that pt has PT tx scheduled on 4/8/25 at 12:45 pm, and she will reschedule OT eval at that appt. Also, informed Faby at [facility] ([phone]) of pt's dc today after HD. Faby stated that she will inform her nurse of above and confirmed pt will resume OP HD on 4/7/25 at 6:30 am. Informed Faby that SW will fax Dc summary upon completion. Therapy and OP HD information added to AVS.    Met with pt and pt's spouse, Arlyn, in room to confirm dc today. Pt was AAOx4 with pleasant affect. Pt reported that he was feeling better and ready for dc. Informed pt and pt's spouse that CRISTAL s/w Silke at [facility] and  regarding OT eval. Informed pt's spouse that Silke stated they canceled eval because pt was not feeling well. Pt's spouse confirmed above. Informed pt's spouse that pt's OT eval will be rescheduled at pt's PT appt on 4/8/25 at 12:45 pm. Also, informed pt and pt's spouse that pt will resume OP HD on 4/7/25 same chair time (6:30 am). Informed pt and pt's spouse that Therapy and OP HD information were added to AVS. Pt  and pt's spouse were amenable to dc home today with PT and OT at [facility]. Pt's spouse will transport pt home. Pt will have support of his family (spouse and sister Teetee) post-dc. Pt and/or pt's spouse did not verbalize any further dc needs/concerns. Pt reports coping well and denies further needs, questions, concerns at this time and none indicated. Providing psychosocial and counseling support, education, resources, assistance and discharge planning as indicated. Notified pt's nurse, Ernestine, of pt's dc today with OP Rehab and HD. Informed pt's nurse that SW met with  pt and pt's spouse to discuss arrangement of post-dc services. Informed pt's nurse that pt's spouse was in room to transport pt home after HD. SW remains available.

## 2025-04-04 NOTE — PROGRESS NOTES
04/04/2025  Erasmo Hooper    Current provider:  Brayan Ocampo MD    Device interrogation:      4/4/2025     8:08 AM 4/4/2025     4:17 AM 4/4/2025    12:35 AM 4/3/2025     8:09 PM 4/3/2025     4:31 PM 4/3/2025     1:39 PM 4/3/2025    10:55 AM   TXP LVAD INTERROGATIONS   Type HeartMate3 HeartMate3 HeartMate3 HeartMate3 HeartMate3 HeartMate3 HeartMate3   Flow 3.8 3.2 3.3 2.9 4.1 3.8 2.4    Speed 5000 5000 5000 5000 5000 5000    PI 5.1 7.8 8 7.9 5.2 5.6    Power (Carrington) 3.4 3.4 3.4 3.4 3.6 3.4    LSL 4600 4600 4600 4600      Pulsatility Pulse Intermittent pulse Intermittent pulse Intermittent pulse          Significant value          [facility] to ensure all mechanical assist device settings (IABP or VAD) were appropriate and all parameters were within limits.  I was able to ensure all back up equipment was present, the staff had no issues, and the Perfusion Department daily rounding was complete.      For implantable VADs: Interrogation of Ventricular assist device was performed with analysis of device parameters and review of device function. I have personally reviewed the interrogation findings and agree with findings as stated.     In emergency, the nursing units have been notified to contact the perfusion department either by:  Calling [phone] from 630am to 4pm Mon thru Fri, utilizing the On-Call Finder functionality of Epic and searching for Perfusion, or by contacting the hospital  from 4pm to 630am and on weekends and asking to speak with the perfusionist on call.    9:35 AM

## 2025-04-04 NOTE — PLAN OF CARE
Problem: Hemodialysis  Goal: Safe, Effective Therapy Delivery  Outcome: Progressing  Goal: Effective Tissue Perfusion  Outcome: Progressing  Goal: Absence of Infection Signs and Symptoms  Outcome: Progressing     Bedside dialysis tx ended to the right chest perm cath, saline locked, capped.    Net fluid removed: 0 ml    Report given to bedside nurse Ernestine

## 2025-04-04 NOTE — ASSESSMENT & PLAN NOTE
64 year old male hx of ESRD on HD MWF. Last HD Friday 3/7/25. Nephrology consulted for management of ESRD.      Nephrology History  -Outpatient HD unit: [facility]  -Nephrologist: ?  -HD tx days: MWF  -HD prescription: 3 hrs, F160, 350/800  -Last HD tx: 4/2/25  -HD access: R CVC  -HD modality: iHD  -Residual urine: minimal  -EDW:  81 kg, likely needs to be adjusted up     Plan/Recommendations  ESRD on HD:  -Pre HD weight 82.1 kg. No more syncopal episodes. No more issues while working with PT today. Plan for HD today. Keep net even   -Obtain post HD weight and adjust EDW as outpatient.    -Will continue iHD thrice weekly unless emergent indication arises  -Strict I&Os  -Pre & post HD weight  Anemia in ESRD  -Hgb goal 10-11, hgb 8.6, will trend  -Transfuse for hgb < 7  Mineral Bone Disease in ESRD  - replete phos as needed   -Renal diet if not NPO

## 2025-04-04 NOTE — CARE UPDATE
Care Update:     No acute events overnight. Patient in room [id number] A. Blood glucose stable on current inpatient regimen.No hypoglycemia noted over the past 24-hours. Endocrine will continue to follow and manage insulin orders inpatient.       Steroid use- None.   Renal function-   Lab Results   Component Value Date    CREATININE 6.5 (H) 04/04/2025        Diet Heart Healthy Standard Tray     POCT Glucose   Date Value Ref Range Status   04/04/2025 113 (H) 70 - 110 mg/dL Final   04/03/2025 141 (H) 70 - 110 mg/dL Final   04/03/2025 134 (H) 70 - 110 mg/dL Final   04/03/2025 111 (H) 70 - 110 mg/dL Final   04/02/2025 121 (H) 70 - 110 mg/dL Final     Lab Results   Component Value Date    HGBA1C 6.5 (H) 03/10/2025     Endocrine  Type 2 diabetes mellitus without complication, without long-term current use of insulin  Endocrinology consulted for BG management.   BG goal 140-180     - Novolog (Insulin Aspart) prn for BG excursions LDC SSI (150/50)  - BG checks AC/HS  - Hypoglycemia protocol in place     ** Please notify Endocrine for any change and/or advance in diet**  ** Please call Endocrine for any BG related issues **     Discharge Planning:   TBD. Please notify endocrinology prior to discharge.      Erasmo Parrish DNP, FNP-C  Department of Endocrinology  Inpatient Glycemic Management

## 2025-04-04 NOTE — SUBJECTIVE & OBJECTIVE
Interval History: NAOEN. Feels fine this am. No more pre/syncopal episodes. Potential DC home with higher dry weight.     Continuous Infusions:  Scheduled Meds:   amiodarone  400 mg Oral Daily    amitriptyline  25 mg Oral Once per day on Monday Wednesday Friday    atorvastatin  40 mg Oral QHS    guaiFENesin  600 mg Oral BID    mupirocin   Nasal BID    pregabalin  100 mg Oral Every other day    venlafaxine  37.5 mg Oral Daily    warfarin  1.25 mg Oral Every Mon, Wed, Fri    warfarin  2.5 mg Oral Every Tues, Thurs, Sat, Sun     PRN Meds:  Current Facility-Administered Medications:     dextrose 50%, 12.5 g, Intravenous, PRN    dextrose 50%, 25 g, Intravenous, PRN    glucagon (human recombinant), 1 mg, Intramuscular, PRN    glucose, 16 g, Oral, PRN    glucose, 24 g, Oral, PRN    insulin aspart U-100, 0-5 Units, Subcutaneous, QID (AC + HS) PRN    Review of patient's allergies indicates:  No Known Allergies  Objective:     Vital Signs (Most Recent):  Temp: 98.8 °F (37.1 °C) (04/04/25 0751)  Pulse: 85 (04/04/25 1049)  Resp: 18 (04/04/25 0751)  BP: (!) 80/0 (04/04/25 0757)  SpO2: 95 % (04/04/25 0751) Vital Signs (24h Range):  Temp:  [98.2 °F (36.8 °C)-99 °F (37.2 °C)] 98.8 °F (37.1 °C)  Pulse:  [55-95] 85  Resp:  [16-20] 18  SpO2:  [93 %-99 %] 95 %  BP: ()/(0-69) 80/0     Patient Vitals for the past 72 hrs (Last 3 readings):   Weight   04/02/25 2115 80.8 kg (178 lb 2.1 oz)     Body mass index is 23.5 kg/m².      Intake/Output Summary (Last 24 hours) at 4/4/2025 1114  Last data filed at 4/4/2025 0932  Gross per 24 hour   Intake 952 ml   Output --   Net 952 ml            Physical Exam  Constitutional:       Appearance: Normal appearance.   Neck:      Comments: JVP not elevated  Cardiovascular:      Rate and Rhythm: Normal rate and regular rhythm.      Pulses: Normal pulses.      Heart sounds: Normal heart sounds.      Comments: LVAD hum  Pulmonary:      Effort: Pulmonary effort is normal. No respiratory distress.       Breath sounds: Normal breath sounds. No wheezing or rales.   Abdominal:      General: Abdomen is flat. There is no distension.      Palpations: Abdomen is soft.      Tenderness: There is no abdominal tenderness.   Musculoskeletal:      Cervical back: Normal range of motion and neck supple.      Right lower leg: No edema.      Left lower leg: No edema.   Skin:     General: Skin is warm and dry.   Neurological:      General: No focal deficit present.      Mental Status: He is alert and oriented to person, place, and time.   Psychiatric:         Mood and Affect: Mood normal.         Behavior: Behavior normal.            Significant Labs:  CBC:  Recent Labs   Lab 04/02/25  1402 04/03/25 0321 04/04/25  0648   WBC 5.53 5.02 5.16   RBC 3.63* 3.39* 3.62*   HGB 9.4* 8.6* 9.1*   HCT 31.3* 29.2* 31.1*    196 219   MCV 86 86 86   MCH 25.9* 25.4* 25.1*   MCHC 30.0* 29.5* 29.3*     BNP:  Recent Labs   Lab 04/01/25  0910 04/02/25  1436 04/04/25  0648   * 559* 561*     CMP:  Recent Labs   Lab 04/01/25  0910 04/02/25  1402 04/03/25 0321 04/04/25  0648   CALCIUM 8.5* 8.6* 8.4* 8.2*   ALBUMIN 2.5* 2.5*  --  2.3*    137 136 136   K 5.1 4.2 4.2 4.7   CO2 23 25 22* 21*    102 106 105   BUN 35* 20 23 34*   CREATININE 6.5* 4.5* 5.2* 6.5*   ALKPHOS 105 105  --  112   ALT 25 27  --  30   AST 22 25  --  29   BILITOT 0.5 0.4  --  0.4      Coagulation:   Recent Labs   Lab 04/02/25  1402 04/03/25 0321 04/04/25  0649   INR 2.8* 3.3* 3.5*   APTT 46.4* 47.7* 49.0*     LDH:  Recent Labs   Lab 04/03/25 0321 04/04/25  0649   * 371*     Microbiology:  Microbiology Results (last 7 days)       Procedure Component Value Units Date/Time    Blood culture [[id number]]  (Normal) Collected: 04/03/25 1036    Order Status: Completed Specimen: Blood Updated: 04/03/25 1900     Blood Culture No Growth After 6 Hours    Blood culture [[id number]]  (Normal) Collected: 04/03/25 1041    Order Status: Completed Specimen: Blood  Updated: 04/03/25 1900     Blood Culture No Growth After 6 Hours            I have reviewed all pertinent labs within the past 24 hours.    Estimated Creatinine Clearance: 13.3 mL/min (A) (based on SCr of 6.5 mg/dL (H)).    Diagnostic Results:  I have reviewed all pertinent imaging results/findings within the past 24 hours.

## 2025-04-04 NOTE — NURSING
Patient is ready for discharge. Patient stable alert and oriented. IVs removed. No complaints of pain. Discussed discharge plan. Reviewed medications and side effects, appointments, and answered questions with patient and family. Awaiting dialysis completion prior dc.

## 2025-04-04 NOTE — PLAN OF CARE
Problem: Adult Inpatient Plan of Care  Goal: Plan of Care Review  Outcome: Progressing  Goal: Patient-Specific Goal (Individualized)  Outcome: Progressing  Goal: Absence of Hospital-Acquired Illness or Injury  Outcome: Progressing  Goal: Optimal Comfort and Wellbeing  Outcome: Progressing  Goal: Readiness for Transition of Care  Outcome: Progressing     Problem: Diabetes Comorbidity  Goal: Blood Glucose Level Within Targeted Range  Outcome: Progressing     Problem: Infection  Goal: Absence of Infection Signs and Symptoms  Outcome: Progressing     Problem: Ventricular Assist Device  Goal: Optimal Adjustment to Device  Outcome: Progressing  Goal: Absence of Bleeding  Outcome: Progressing  Goal: Absence of Embolism Signs and Symptoms  Outcome: Progressing  Goal: Optimal Blood Flow  Outcome: Progressing  Goal: Absence of Infection Signs and Symptoms  Outcome: Progressing  Goal: Effective Right-Sided Heart Function  Outcome: Progressing     Problem: Fall Injury Risk  Goal: Absence of Fall and Fall-Related Injury  Outcome: Progressing   AAOx4,O2 sats > 99% on room air. Plan of care discussed with patient. Patient has no complaints of chest pain/SOB/palpitations. Pt ambulating  independently, fall precautions in place,no falls/injuries through the shift.Discussed medications and care.Patient has no questions at this time.Pt resting comfortably with no acute distress.Call light within reach,bed at lowest position.

## 2025-04-06 ENCOUNTER — RESULTS FOLLOW-UP (OUTPATIENT)
Dept: EMERGENCY MEDICINE | Facility: HOSPITAL | Age: 65
End: 2025-04-06

## 2025-04-06 LAB — BACTERIA UR CULT: ABNORMAL

## 2025-04-07 ENCOUNTER — ANTI-COAG VISIT (OUTPATIENT)
Dept: CARDIOLOGY | Facility: CLINIC | Age: 65
End: 2025-04-07
Payer: MEDICAID

## 2025-04-07 DIAGNOSIS — Z95.811 LVAD (LEFT VENTRICULAR ASSIST DEVICE) PRESENT: Primary | ICD-10-CM

## 2025-04-07 DIAGNOSIS — Z95.811 LVAD (LEFT VENTRICULAR ASSIST DEVICE) PRESENT: ICD-10-CM

## 2025-04-07 RX ORDER — CHOLECALCIFEROL (VITAMIN D3) 25 MCG
1000 TABLET ORAL
Qty: 30 TABLET | Refills: 0 | Status: SHIPPED | OUTPATIENT
Start: 2025-04-07

## 2025-04-07 NOTE — PROGRESS NOTES
Follow-up    Dc summary not completed. Faxed ([phone]) clinicals (H&P, AVS, and recent CICU, Nephrology, and HD Progress Notes) to Miranda at [facility] ([phone]) for continuity of care. Will f/u to confirm receipt of fax.    9:12   F/u with Sulema at [facility] regarding receipt of fax. Sulema confirmed that fax was received.

## 2025-04-07 NOTE — DISCHARGE SUMMARY
"[facility] - Cardiac Intensive [facility]  Heart Transplant  Discharge Summary      Patient Name: Radha Abbott  MRN: [id number]  Admission Date: 4/2/2025  Hospital Length of Stay: 1 days  Discharge Date and Time: 04/07/2025 12:10 PM  Attending Physician: No att. providers found   Discharging Provider: Erasto Park NP  Primary Care Provider: Vasu Kong MD     HPI: 63 y/o AAM w/ PMHx of stage D HFrEF 2/2 ICM s/p HM3 as DT (12/1/2023), ESRD on HD (MWF), and debility who presented to hospital as transfer from ED in [place] for syncope.  He had a recent hospitalization at [facility] from 3/10-3/13/25 for falls. Workup during hospitalization was negative for ny acute processes and he was discharged home with outpatient PT.   Per wife, since he has been home he has continued to feel weak and nauseous. Otherwise, no worsening sob/huerta, palpitations, cp/chest discomfort.  This AM after he had a HD session, his sister drove him home. As he was getting out of the car he felt more "weak" and syncopized. He does not have any recollection of the event however his sister ( and wife who was on the phone) report him losing consciousness. LVAD low flow alarm was heard.  They then took him to the nearest ED who then transferred him to [facility] for higher level of care.  Labs without any signficant changes since prior.  LVAD interrogation with 1 low flow alarm at 10:46 AM with flow of 2.2.  Interrogation of his medtronic device did not reveal any arrhythmias since Dec 5,2023.     * No surgery found *     Hospital Course: He was given small IVF fluid bolus. BP remained stable as did echo findings. He tolerated HD session the following day and was subsequently discharged home with higher dry weight.     Consults (From admission, onward)          Status Ordering Provider     Inpatient consult to Nephrology  Once        Provider:  (Not yet assigned)    Completed RAMY DUBOSE     Inpatient consult to Registered Dietitian/Nutritionist  " Once        Provider:  (Not yet assigned)    Completed RAMY DUBOSE     Inpatient consult to Endocrinology  Once        Provider:  (Not yet assigned)    Completed RAMY DUBOSE            Pending Diagnostic Studies:       None          Final Active Diagnoses:    Diagnosis Date Noted POA    PRINCIPAL PROBLEM:  Falls [R29.6] 03/10/2025 Not Applicable    LVAD (left ventricular assist device) present [Z95.811] 12/01/2023 Not Applicable    Chronic combined systolic and diastolic CHF, NYHA class 4 [I50.42] 06/12/2024 Yes     Chronic    Anticoagulant long-term use [Z79.01] 05/28/2024 Not Applicable    ESRD (end stage renal disease) [N18.6] 01/22/2024 Yes    Type 2 diabetes mellitus without complication, without long-term current use of insulin [E11.9] 10/07/2023 Yes      Problems Resolved During this Admission:      Discharged Condition: good    Disposition: Home or Self Care    Follow Up:    Patient Instructions:      Ambulatory Referral/Consult to Occupational Therapy   Standing Status: Future   Referral Priority: Routine Referral Type: Occupational Therapy   Referral Reason: Specialty Services Required   Requested Specialty: Occupational Therapy   Number of Visits Requested: 1     Medications:  Reconciled Home Medications:      Medication List        CHANGE how you take these medications      warfarin 2.5 MG tablet  Commonly known as: COUMADIN  Take 1.25 mg orally daily  What changed: additional instructions            CONTINUE taking these medications      amiodarone 400 MG tablet  Commonly known as: PACERONE  Take 1 tablet (400 mg total) by mouth once daily.     amitriptyline 25 MG tablet  Commonly known as: ELAVIL  Take 1 tablet (25 mg total) by mouth 3 (three) times a week.     atorvastatin 40 MG tablet  Commonly known as: LIPITOR  Take 1 tablet (40 mg total) by mouth every evening.     Fish OiL 1,000 (120-180) mg Cap  Generic drug: omega 3-dha-epa-fish oil  Take 1 capsule by mouth once daily.      megestroL 40 MG Tab  Commonly known as: MEGACE  Take 40-80 mg by mouth once daily As needed for appetite.     miscellaneous medical supply Kit  by Apply Externally route 2 (two) times a day. Use twice daily at 8 AM and 3 PM and record the value in [employer] as directed.     pregabalin 100 MG capsule  Commonly known as: LYRICA  Take 1 capsule by mouth every other day.     sevelamer carbonate 800 mg Tab  Commonly known as: RENVELA  Take 800 mg by mouth 3 (three) times daily with meals.     traZODone 50 MG tablet  Commonly known as: DESYREL  Take 0.5 tablets (25 mg total) by mouth every evening.     venlafaxine 37.5 MG Tab  Commonly known as: EFFEXOR  Take 1 tablet (37.5 mg total) by mouth once daily.     vitamin D 1000 units Tab  Commonly known as: VITAMIN D3  Take 1 tablet by mouth once daily              Erasto Park NP  Heart Transplant  [facility] - Card[facility]

## 2025-04-07 NOTE — PROGRESS NOTES
Admitted 4/2 - 4/4. Hospital course: Radha Abbott is a 64 y.o. male s/p HM3 lvad from 12.1.2023 admitted for evaluation of syncope is preparing for discharge today. During his admission Mr. Abbott was assessed for hypovolemia. Plan for less fluid removal with iHD. His inr today was 3.5. He was administered warfarin 2.5mg yesterday. Plan to decrease his home warfarin dose to 1.25mg orally daily (expecting increase in volume with iHD reduction in removal).

## 2025-04-08 ENCOUNTER — CLINICAL SUPPORT (OUTPATIENT)
Dept: REHABILITATION | Facility: HOSPITAL | Age: 65
End: 2025-04-08
Payer: MEDICAID

## 2025-04-08 ENCOUNTER — CLINICAL SUPPORT (OUTPATIENT)
Dept: REHABILITATION | Facility: HOSPITAL | Age: 65
End: 2025-04-08
Attending: INTERNAL MEDICINE
Payer: MEDICAID

## 2025-04-08 ENCOUNTER — ANTI-COAG VISIT (OUTPATIENT)
Dept: CARDIOLOGY | Facility: CLINIC | Age: 65
End: 2025-04-08
Payer: MEDICAID

## 2025-04-08 ENCOUNTER — LAB VISIT (OUTPATIENT)
Dept: LAB | Facility: HOSPITAL | Age: 65
End: 2025-04-08
Attending: INTERNAL MEDICINE
Payer: MEDICAID

## 2025-04-08 DIAGNOSIS — R29.6 FALLS: ICD-10-CM

## 2025-04-08 DIAGNOSIS — R29.898 DECREASED STRENGTH OF UPPER EXTREMITY: Primary | ICD-10-CM

## 2025-04-08 DIAGNOSIS — R27.8 DECREASED COORDINATION: ICD-10-CM

## 2025-04-08 DIAGNOSIS — R53.1 GENERALIZED WEAKNESS: Primary | ICD-10-CM

## 2025-04-08 DIAGNOSIS — Z95.811 PRESENCE OF VENTRICULAR ASSIST DEVICE: ICD-10-CM

## 2025-04-08 DIAGNOSIS — Z95.811 LVAD (LEFT VENTRICULAR ASSIST DEVICE) PRESENT: Primary | ICD-10-CM

## 2025-04-08 DIAGNOSIS — Z95.811 LVAD (LEFT VENTRICULAR ASSIST DEVICE) PRESENT: ICD-10-CM

## 2025-04-08 LAB
BACTERIA BLD CULT: NORMAL
BACTERIA BLD CULT: NORMAL
INR PPP: 2.7 (ref 0.8–1.2)
PROTHROMBIN TIME: 28 SECONDS (ref 9–12.5)

## 2025-04-08 PROCEDURE — 36415 COLL VENOUS BLD VENIPUNCTURE: CPT

## 2025-04-08 PROCEDURE — 85610 PROTHROMBIN TIME: CPT

## 2025-04-08 PROCEDURE — 97110 THERAPEUTIC EXERCISES: CPT | Mod: PN

## 2025-04-08 PROCEDURE — 97166 OT EVAL MOD COMPLEX 45 MIN: CPT | Mod: PN

## 2025-04-08 PROCEDURE — 93793 ANTICOAG MGMT PT WARFARIN: CPT | Mod: ,,,

## 2025-04-08 PROCEDURE — 97530 THERAPEUTIC ACTIVITIES: CPT | Mod: PN

## 2025-04-08 NOTE — PROGRESS NOTES
[facility]    2025 12:14 PM    Assessment/Plan:    Patient presents today with therapeutic INR.    Assessment of patient findings and chart review: no significant findings     Recommendation for patient's warfarin regimen: Continue current maintenance dose    Recommend repeat INR Friday  _________________________________________________________________    Radha Abbott (64 y.o.) is followed by the "[facility]" Anticoagulation Management [facility].    Anticoagulation Summary  As of 2025      INR goal:  2.0-3.0   TTR:  71.2% (11.5 mo)   INR used for dosin.7 (2025)   Warfarin maintenance plan:  1.25 mg (2.5 mg x 0.5) every day   Weekly warfarin total:  8.75 mg   Plan last modified:  Jesenia Jensen, PharmD (2025)   Next INR check:  2025   Target end date:  --    Indications    LVAD (left ventricular assist device) present [Z95.811]                 Anticoagulation Episode Summary       INR check location:  --    Preferred lab:  --    Send INR reminders to:  TIFFANIE COUMADIN LVAD    Comments:  [place]          Anticoagulation Care Providers       Provider Role Specialty Phone number    Sajan Hurley MD [facility] Cardiology [phone]

## 2025-04-08 NOTE — PROGRESS NOTES
Outpatient Rehab    Physical Therapy Visit    Patient Name: Radha Abbott  MRN: [id number]  YOB: 1960  Encounter Date: 4/8/2025    Therapy Diagnosis:   Encounter Diagnosis   Name Primary?    Generalized weakness Yes     Physician: Walter Mcintyre MD    Physician Orders: Eval and Treat  Medical Diagnosis: LVAD (left ventricular assist device) present  Acute on chronic combined systolic and diastolic CHF, NYHA class 4    Visit # / Visits Authorized:  1 / 20  Insurance Authorization Period: 3/24/2025 to 12/31/2025  Date of Evaluation: 3/25/2025  Plan of Care Certification: 3/25/2025 to 05/23/2025      Time In:   10:40  Time Out:  11:35  Total Time:   55 minutes   Total Billable Time:  55 minutes     FOTO:  Intake Score:  %  Survey Score 1:  %  Survey Score 2:  %         Subjective   Patient reports going to the hospital this past week due to difficulty walking..  Pain reported as 7/10. lower back    Objective        Objective Measures updated at progress report unless specified.       Treatment:  Therapeutic Exercise  TE 1: Supine LTRs with ball 2x10  TE 2: Supine DKTC with ball 2x10  TE 4: Supine SLR 2x10  TE 5: Supine marching 2x10  TE 6: Supine hip adduction ball squeeze 3 sec hold 3x10  TE 7: SL hip abduction 2x10  TE 8: STS with both hands on assistive device 2x10      Time Entry(in minutes):  Therapeutic Exercise Time Entry: 55    Assessment & Plan   Assessment: Patient tolerated first session after evaluation well. He is ambulating with his rolling walker and noted decreased gina. Focused on supine lumbar mobility followed by overall hip strengthening. Patient able to perform sit to stand with hands on the rolling walker. Patient presented with difficulty performing sit to stand with only one hand on assitive device. Continue progressing in POC as tolerated.  Evaluation/Treatment Tolerance: Patient tolerated treatment well    Patient will continue to benefit from skilled outpatient physical  therapy to address the deficits listed in the problem list box on initial evaluation, provide pt/family education and to maximize pt's level of independence in the home and community environment.     Patient's spiritual, cultural, and educational needs considered and patient agreeable to plan of care and goals.     Education  Education was done with Patient. The patient's learning style includes Demonstration and Listening. The patient Demonstrates understanding and Verbalizes understanding.                 Plan: Continue plan from evaluation.    Goals:   Active       Ambulation/movement       Patient will walk 250 feet in 2 minutes (Progressing)       Start:  03/25/25    Expected End:  05/23/25               Functional outcome       Patient will show a significant change in FOTO patient-reported outcome tool to demonstrate subjective improvement (Progressing)       Start:  03/25/25    Expected End:  05/23/25            Patient stated goal: patient would like to not have pain in his lower back and have better balance.  (Progressing)       Start:  03/25/25    Expected End:  05/23/25            Patient will demonstrate independence in home program for support of progression (Progressing)       Start:  03/25/25    Expected End:  04/18/25               Pain       Patient will report pain of 5/10 demonstrating a reduction of overall pain (Progressing)       Start:  03/25/25    Expected End:  05/23/25               Range of Motion       Patient will achieve spinal flexion to 75% (Progressing)       Start:  03/25/25    Expected End:  05/23/25            Patient will achieve spinal extension to 75% (Progressing)       Start:  03/25/25    Expected End:  05/23/25                Osman Rowley, PT
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
## 2025-04-09 NOTE — PROGRESS NOTES
"  Outpatient Rehab    Physical Therapy Visit    Patient Name: Radha Abbott  MRN: [id number]  YOB: 1960  Encounter Date: 4/10/2025    Therapy Diagnosis:   Encounter Diagnosis   Name Primary?    Generalized weakness Yes       Physician: Walter Mcintyre MD    Physician Orders: Eval and Treat  Medical Diagnosis: LVAD (left ventricular assist device) present  Acute on chronic combined systolic and diastolic CHF, NYHA class 4    Visit # / Visits Authorized:  2 / 20  Insurance Authorization Period: 3/24/2025 to 12/31/2025  Date of Evaluation: 3/25/2025  Plan of Care Certification: 3/25/2025 to 05/23/2025      Time In:   12:45  Time Out:  1:40  Total Time:   55 minutes   Total Billable Time:  55 minutes     FOTO:  Intake Score: 53%  Survey Score 1:  %  Survey Score 2:  %         Subjective   Patient reports low back pain and left leg gives out on him..  Pain reported as 7/10. lower back    Objective        Objective Measures updated at progress report unless specified.       Treatment:  Therapeutic Exercise  TE 1: Supine LTRs with ball 2x10  TE 2: Supine DKTC with ball 2x10  TE 3: Bridges x10  TE 4: Supine SLR (deferred and performed LAQ due to pain today) 2x10  TE 5: Seated marching 2x10  TE 6: Seated hip adduction ball squeeze 3 sec hold 3x10  TE 7: SL hip abduction 2x10  TE 8: STS without hands on assistive device x4; STS with hands on AD x10  TE 9: Supine hamstring stretch 3x30"  TE 10: SAQ 2x10        Time Entry(in minutes):  Therapeutic Exercise Time Entry: 55    Assessment & Plan   Assessment: Patient reports moderate low back pain and decreased endurance with standing and walking. Encouraged patient to perform HEP at home in order to progress in therapy. Deferred SLR due to low back pain and difficulty engaging abdominal muscles and worked on quad strengthening to improve balance and muscle strength.  Evaluation/Treatment Tolerance: Patient tolerated treatment well    Patient will continue to benefit " from skilled outpatient physical therapy to address the deficits listed in the problem list box on initial evaluation, provide pt/family education and to maximize pt's level of independence in the home and community environment.     Patient's spiritual, cultural, and educational needs considered and patient agreeable to plan of care and goals.             Plan:      Goals:   Active       Ambulation/movement       Patient will walk 250 feet in 2 minutes (Progressing)       Start:  03/25/25    Expected End:  05/23/25               Functional outcome       Patient will show a significant change in FOTO patient-reported outcome tool to demonstrate subjective improvement (Progressing)       Start:  03/25/25    Expected End:  05/23/25            Patient stated goal: patient would like to not have pain in his lower back and have better balance.  (Progressing)       Start:  03/25/25    Expected End:  05/23/25            Patient will demonstrate independence in home program for support of progression (Progressing)       Start:  03/25/25    Expected End:  04/18/25               Pain       Patient will report pain of 5/10 demonstrating a reduction of overall pain (Progressing)       Start:  03/25/25    Expected End:  05/23/25               Range of Motion       Patient will achieve spinal flexion to 75% (Progressing)       Start:  03/25/25    Expected End:  05/23/25            Patient will achieve spinal extension to 75% (Progressing)       Start:  03/25/25    Expected End:  05/23/25                Saira Somers PTA

## 2025-04-10 ENCOUNTER — PATIENT MESSAGE (OUTPATIENT)
Dept: CARDIOTHORACIC SURGERY | Facility: CLINIC | Age: 65
End: 2025-04-10
Payer: MEDICAID

## 2025-04-10 ENCOUNTER — DOCUMENTATION ONLY (OUTPATIENT)
Dept: REHABILITATION | Facility: HOSPITAL | Age: 65
End: 2025-04-10
Payer: MEDICAID

## 2025-04-10 ENCOUNTER — CLINICAL SUPPORT (OUTPATIENT)
Dept: REHABILITATION | Facility: HOSPITAL | Age: 65
End: 2025-04-10
Payer: MEDICAID

## 2025-04-10 DIAGNOSIS — Z95.811 LVAD (LEFT VENTRICULAR ASSIST DEVICE) PRESENT: Primary | ICD-10-CM

## 2025-04-10 DIAGNOSIS — R27.8 DECREASED COORDINATION: ICD-10-CM

## 2025-04-10 DIAGNOSIS — R29.898 DECREASED STRENGTH OF UPPER EXTREMITY: Primary | ICD-10-CM

## 2025-04-10 DIAGNOSIS — R53.1 GENERALIZED WEAKNESS: Primary | ICD-10-CM

## 2025-04-10 PROCEDURE — 97530 THERAPEUTIC ACTIVITIES: CPT | Mod: PN

## 2025-04-10 PROCEDURE — 97110 THERAPEUTIC EXERCISES: CPT | Mod: PN,CQ

## 2025-04-10 NOTE — PROGRESS NOTES
"  [facility]  Occupational Therapy Treatment Note     Today's Date: 4/10/2025  Name: Radha Abbott  Clinic Number: [id number]    Therapy Diagnosis:   Encounter Diagnoses   Name Primary?    Decreased strength of upper extremity Yes    Decreased coordination      Physician: Brayan Ocampo MD    Physician Orders: OT eval and tx  Medical Diagnosis: LVAD (left ventricular assist device) present [Z95.811], Falls [R29.6]     Evaluation Date: 4/8/2025  Insurance Authorization Period: 4/7/2025 - 12/31/2025  Plan of Care Certification Period: 4/8/2025 to 7/1/2025  Progress Note Due: 5/6/2025     Visit # / Visits authorized: 1/20  FOTO: 1/3    Precautions: LVAD/pacemaker (wears 5# vest) - no lifting >25#, abdominal tube from LVAD, DM, dialysis port (dialysis 3x/week M,W,F)    Time In: 1340  Time Out: 1425  Total Time: 45 minutes  Total Billable Time: 45 minutes    Subjective     Pt reports: "I'm tired but I want to work."    he was compliant with home exercise program.  Response to previous treatment: good  Functional change: compliance with HEP    Pain: 0/10 (none on evaluation)  Location: BUE's    Objective   Objective measures updated at progress report unless specified.    Treatment     Radha received the treatments listed below:      therapeutic exercises to develop strength and ROM of BUE's for 45 minutes including:  - reviewed HEP issued at evaluation  - bilateral scap retraction AROM 3x10  - bilateral shoulder flexion AROM with 3# dowel 2x10  - chest press with 3# dowel 2x10  - overhead press with 3# dowel 2x10  - bilateral shoulder ER with 2# DB's (shoulders abducted to 90*) 2x10  - bilateral elbow flex/ext AROM with 4# DB's 2x10  - UBE x 6 min    therapeutic activities to improve functional performance of BUE's for 0 minutes including:  - NT    manual therapy techniques were applied to BUE's for 0 minutes including:  - NT    neuromuscular re-education activities to improve Coordination " and Proprioception of BUE's for 0 minutes including:  - NT    self-care techniques to improve independence and safety with ADL/IADL tasks for 0 minutes including:  - NT    direct contact modalities after being cleared for contraindications for 0 minutes including:  - NT    supervised modalities after being cleared for contradictions for 0 minutes including:  - NT    Home Exercises and Education Provided     Education provided:   - reviewed HEP issued at evaluation  - progress toward goals    Education Recipient:  [x] Patient [] Other recipient present    Patient Learning Style:  [x] Demonstration [x] Listening [] Pictures/video [] Reading [] Tactile    Patient Response to Education:  [x] Demonstrates understanding [x] Verbalizes understanding    [] Requires assistance [] Requires continuing/additional education    Written Home Exercises Provided: Patient instructed to continue previously-issued HEP.  See EMR under Patient Instructions for exercises provided during therapy sessions.      Assessment     Radha was seen for his first tx session since initial evaluation on this date. He required min-mod rest breaks throughout session and min A/ min verbal cues to maintain form/technique of exercises.    Radha is progressing towards his goals and there are no updates to goals at this time. Pt prognosis is Fair.     Radha will continue to benefit from skilled outpatient occupational therapy services to address the deficits listed in the problem list on initial evaluation, to provide pt/family education, and to maximize pt's level of independence in the home and community environment.     Pt's spiritual, cultural and educational needs considered and pt agreeable to plan of care and goals.    Anticipated barriers to occupational therapy: LVAD/pacemaker (wears 5# vest) - no lifting >25#, abdominal tube from LVAD, DM, dialysis port (dialysis 3x/week M,W,F), post COVID, chronic condition    Goals:   Active       Fine hand  "motor use       Patient will complete 9-Hole Peg Test with his right dominant hand in <45 seconds. (Progressing)       Start:  04/08/25    Expected End:  07/01/25               Functional outcome       Pt will report an increase in FOTO intake score of > 55%, which would indicate an improvement in quality of life.  (Progressing)       Start:  04/08/25    Expected End:  07/01/25            Patient stated goal: "get my strength back up"  (Progressing)       Start:  04/08/25    Expected End:  07/01/25            Patient will demonstrate independence in home program for support of progression (Progressing)       Start:  04/08/25    Expected End:  05/06/25               Strength       Patient will achieve bilateral shoulder flexion strength of 4+/5 (Progressing)       Start:  04/08/25    Expected End:  07/01/25            Patient will achieve bilateral shoulder external rotation strength of 4/5 in neutral (Progressing)       Start:  04/08/25    Expected End:  07/01/25            Patient will achieve bilateral elbow flexion strength of 5/5 (Progressing)       Start:  04/08/25    Expected End:  07/01/25            Patient will achieve bilateral wrist flexion strength of 4/5 (Progressing)       Start:  04/08/25    Expected End:  07/01/25            Patient will achieve bilateral wrist extension strength of 4/5 (Progressing)       Start:  04/08/25    Expected End:  07/01/25            Patient will achieve bilateral  strength of 50# in the 2nd position (Progressing)       Start:  04/08/25    Expected End:  07/01/25            Patient will achieve bilateral lateral pinch strength of 9# (Progressing)       Start:  04/08/25    Expected End:  07/01/25                Plan     Continue occupational therapy services per Plan of Care set on evaluation.    Updates/Grading for next session: progress occupational therapy as tolerated    LYUDMILA DOCKERY CHT    "

## 2025-04-10 NOTE — PROGRESS NOTES
PT/PTA met face to face to discuss pt's treatment plan and progress towards established goals. Pt will be seen by a physical therapist minimally every 6th visit or every 30 days.    Saira Somers PTA

## 2025-04-11 ENCOUNTER — ANTI-COAG VISIT (OUTPATIENT)
Dept: CARDIOLOGY | Facility: CLINIC | Age: 65
End: 2025-04-11
Payer: MEDICAID

## 2025-04-11 ENCOUNTER — LAB VISIT (OUTPATIENT)
Dept: LAB | Facility: HOSPITAL | Age: 65
End: 2025-04-11
Attending: INTERNAL MEDICINE
Payer: MEDICAID

## 2025-04-11 ENCOUNTER — PATIENT MESSAGE (OUTPATIENT)
Dept: TRANSPLANT | Facility: CLINIC | Age: 65
End: 2025-04-11
Payer: MEDICAID

## 2025-04-11 DIAGNOSIS — Z95.811 PRESENCE OF VENTRICULAR ASSIST DEVICE: ICD-10-CM

## 2025-04-11 DIAGNOSIS — Z95.811 LVAD (LEFT VENTRICULAR ASSIST DEVICE) PRESENT: Primary | ICD-10-CM

## 2025-04-11 LAB
INR PPP: 2.5 (ref 0.8–1.2)
PROTHROMBIN TIME: 26.4 SECONDS (ref 9–12.5)

## 2025-04-11 PROCEDURE — 85610 PROTHROMBIN TIME: CPT

## 2025-04-11 PROCEDURE — 36415 COLL VENOUS BLD VENIPUNCTURE: CPT

## 2025-04-11 NOTE — PROGRESS NOTES
[facility]    2025 11:37 AM    Assessment/Plan:    Patient presents today with therapeutic INR.    Assessment of patient findings and chart review: no significant findings     Recommendation for patient's warfarin regimen: Continue current maintenance dose    Recommend repeat INR in 1 week  _________________________________________________________________    Radha Abbott (64 y.o.) is followed by the [facility].    Anticoagulation Summary  As of 2025      INR goal:  2.0-3.0   TTR:  71.5% (11.6 mo)   INR used for dosin.5 (2025)   Warfarin maintenance plan:  1.25 mg (2.5 mg x 0.5) every day   Weekly warfarin total:  8.75 mg   Plan last modified:  Jesenia Jensen, PharmD (2025)   Next INR check:  4/15/2025   Target end date:  --    Indications    LVAD (left ventricular assist device) present [Z95.811]                 Anticoagulation Episode Summary       INR check location:  --    Preferred lab:  --    Send INR reminders to:  [facility]    Comments:  [place]          Anticoagulation Care Providers       Provider Role Specialty Phone number    Sajan Hurley MD [facility] [phone]

## 2025-04-15 ENCOUNTER — ANTI-COAG VISIT (OUTPATIENT)
Dept: CARDIOLOGY | Facility: CLINIC | Age: 65
End: 2025-04-15
Payer: MEDICAID

## 2025-04-15 ENCOUNTER — CLINICAL SUPPORT (OUTPATIENT)
Dept: REHABILITATION | Facility: HOSPITAL | Age: 65
End: 2025-04-15
Attending: PHYSICAL THERAPIST
Payer: MEDICAID

## 2025-04-15 ENCOUNTER — LAB VISIT (OUTPATIENT)
Dept: LAB | Facility: HOSPITAL | Age: 65
End: 2025-04-15
Attending: INTERNAL MEDICINE
Payer: MEDICAID

## 2025-04-15 ENCOUNTER — CLINICAL SUPPORT (OUTPATIENT)
Dept: REHABILITATION | Facility: HOSPITAL | Age: 65
End: 2025-04-15
Payer: MEDICAID

## 2025-04-15 DIAGNOSIS — R53.1 GENERALIZED WEAKNESS: Primary | ICD-10-CM

## 2025-04-15 DIAGNOSIS — Z95.811 PRESENCE OF VENTRICULAR ASSIST DEVICE: ICD-10-CM

## 2025-04-15 DIAGNOSIS — R29.898 DECREASED STRENGTH OF UPPER EXTREMITY: Primary | ICD-10-CM

## 2025-04-15 DIAGNOSIS — R27.8 DECREASED COORDINATION: ICD-10-CM

## 2025-04-15 DIAGNOSIS — Z95.811 LVAD (LEFT VENTRICULAR ASSIST DEVICE) PRESENT: Primary | ICD-10-CM

## 2025-04-15 LAB
INR PPP: 2.1 (ref 0.8–1.2)
PROTHROMBIN TIME: 22.9 SECONDS (ref 9–12.5)

## 2025-04-15 PROCEDURE — 97530 THERAPEUTIC ACTIVITIES: CPT | Mod: PN

## 2025-04-15 PROCEDURE — 97110 THERAPEUTIC EXERCISES: CPT | Mod: PN | Performed by: PHYSICAL THERAPIST

## 2025-04-15 PROCEDURE — 85610 PROTHROMBIN TIME: CPT

## 2025-04-15 PROCEDURE — 93793 ANTICOAG MGMT PT WARFARIN: CPT | Mod: ,,,

## 2025-04-15 PROCEDURE — 36415 COLL VENOUS BLD VENIPUNCTURE: CPT

## 2025-04-15 NOTE — PROGRESS NOTES
"  [facility]  Occupational Therapy Treatment Note     Today's Date: 4/15/2025  Name: Radha Abbott  Clinic Number: [id number]    Therapy Diagnosis:   Encounter Diagnoses   Name Primary?    Decreased strength of upper extremity Yes    Decreased coordination      Physician: Brayan Ocampo MD    Physician Orders: OT eval and tx  Medical Diagnosis: LVAD (left ventricular assist device) present [Z95.811], Falls [R29.6]     Evaluation Date: 4/8/2025  Insurance Authorization Period: 4/7/2025 - 12/31/2025  Plan of Care Certification Period: 4/8/2025 to 7/1/2025  Progress Note Due: 5/6/2025     Visit # / Visits authorized: 2/20  FOTO: 1/3    Precautions: LVAD/pacemaker (wears 5# vest) - no lifting >25#, abdominal tube from LVAD, DM, dialysis port (dialysis 3x/week M,W,F)    Time In: 1240  Time Out: 1335  Total Time: 55 minutes  Total Billable Time: 55 minutes    Subjective     Pt reports: "I'm doing ok. I wasn't sore after last session."    he was compliant with home exercise program.  Response to previous treatment: good  Functional change: improved BUE functional strength    Pain: 0/10 (0/10 last session)  Location: BUE's    Objective   Objective measures updated at progress report unless specified.    Treatment     Radha received the treatments listed below:      therapeutic exercises to develop strength and ROM of BUE's for 55 minutes including:  - UBE x 6 min  - bilateral scap retraction AROM 3x10  - chest press machine with 10# 3x10  - row machine with 15# 3x10  - bilateral elbow flex/ext AROM with 4# DB's 3x10  - chair push-ups 4x5    therapeutic activities to improve functional performance of BUE's for 0 minutes including:  - NT    manual therapy techniques were applied to BUE's for 0 minutes including:  - NT    neuromuscular re-education activities to improve Coordination and Proprioception of BUE's for 0 minutes including:  - NT    self-care techniques to improve independence and " safety with ADL/IADL tasks for 0 minutes including:  - NT    direct contact modalities after being cleared for contraindications for 0 minutes including:  - NT    supervised modalities after being cleared for contradictions for 0 minutes including:  - NT    Home Exercises and Education Provided     Education provided:   - progress toward goals    Education Recipient:  [x] Patient [] Other recipient present    Patient Learning Style:  [x] Demonstration [x] Listening [] Pictures/video [] Reading [] Tactile    Patient Response to Education:  [x] Demonstrates understanding [x] Verbalizes understanding    [] Requires assistance [] Requires continuing/additional education    Written Home Exercises Provided: Patient instructed to continue previously-issued HEP.  See EMR under Patient Instructions for exercises provided during therapy sessions.      Assessment     Radha was seen for his 2nd tx session on this date. Transitioned his UB strengthening to machines. He tolerated fairly - required mod rest breaks and min A for sit-->stand off machines.     Radha is progressing towards his goals and there are no updates to goals at this time. Pt prognosis is Fair.     Radha will continue to benefit from skilled outpatient occupational therapy services to address the deficits listed in the problem list on initial evaluation, to provide pt/family education, and to maximize pt's level of independence in the home and community environment.     Pt's spiritual, cultural and educational needs considered and pt agreeable to plan of care and goals.    Anticipated barriers to occupational therapy: LVAD/pacemaker (wears 5# vest) - no lifting >25#, abdominal tube from LVAD, DM, dialysis port (dialysis 3x/week M,W,F), post COVID, chronic condition    Goals:   Active       Fine hand motor use       Patient will complete 9-Hole Peg Test with his right dominant hand in <45 seconds. (Progressing)       Start:  04/08/25    Expected End:   "07/01/25               Functional outcome       Pt will report an increase in FOTO intake score of > 55%, which would indicate an improvement in quality of life.  (Progressing)       Start:  04/08/25    Expected End:  07/01/25            Patient stated goal: "get my strength back up"  (Progressing)       Start:  04/08/25    Expected End:  07/01/25            Patient will demonstrate independence in home program for support of progression (Progressing)       Start:  04/08/25    Expected End:  05/06/25               Strength       Patient will achieve bilateral shoulder flexion strength of 4+/5 (Progressing)       Start:  04/08/25    Expected End:  07/01/25            Patient will achieve bilateral shoulder external rotation strength of 4/5 in neutral (Progressing)       Start:  04/08/25    Expected End:  07/01/25            Patient will achieve bilateral elbow flexion strength of 5/5 (Progressing)       Start:  04/08/25    Expected End:  07/01/25            Patient will achieve bilateral wrist flexion strength of 4/5 (Progressing)       Start:  04/08/25    Expected End:  07/01/25            Patient will achieve bilateral wrist extension strength of 4/5 (Progressing)       Start:  04/08/25    Expected End:  07/01/25            Patient will achieve bilateral  strength of 50# in the 2nd position (Progressing)       Start:  04/08/25    Expected End:  07/01/25            Patient will achieve bilateral lateral pinch strength of 9# (Progressing)       Start:  04/08/25    Expected End:  07/01/25                Plan     Continue occupational therapy services per Plan of Care set on evaluation.    Updates/Grading for next session: progress occupational therapy as tolerated    LYUDMILA DOCKERY CHT    "

## 2025-04-15 NOTE — PROGRESS NOTES
[facility]    04/15/2025 4:16 PM    Assessment/Plan:    Patient presents today with therapeutic INR.    Assessment of patient findings and chart review: no significant findings     Recommendation for patient's warfarin regimen: Continue current maintenance dose    Recommend repeat INR in 1 week  _________________________________________________________________    Radha Abbott (65 y.o.) is followed by the [facility].    Anticoagulation Summary  As of 4/15/2025      INR goal:  2.0-3.0   TTR:  71.8% (11.8 mo)   INR used for dosin.1 (4/15/2025)   Warfarin maintenance plan:  1.25 mg (2.5 mg x 0.5) every day   Weekly warfarin total:  8.75 mg   Plan last modified:  Jesenia Jensen, PharmD (2025)   Next INR check:  2025   Target end date:  --    Indications    LVAD (left ventricular assist device) present [Z95.811]                 Anticoagulation Episode Summary       INR check location:  --    Preferred lab:  --    Send INR reminders to:  [facility]    Comments:  [place]          Anticoagulation Care Providers       Provider Role Specialty Phone number    Sajan Hurley MD [facility] [phone]

## 2025-04-15 NOTE — PROGRESS NOTES
"  Outpatient Rehab    Physical Therapy Visit    Patient Name: Radha Abbott  MRN: [id number]  YOB: 1960  Encounter Date: 4/15/2025    Therapy Diagnosis:   Encounter Diagnosis   Name Primary?    Generalized weakness Yes     Physician: Walter Mcintyre MD    Physician Orders: Eval and Treat  Medical Diagnosis: LVAD (left ventricular assist device) present  Acute on chronic combined systolic and diastolic CHF, NYHA class 4    Visit # / Visits Authorized:  3 / 20  Insurance Authorization Period: 3/24/2025 to 12/31/2025  Date of Evaluation: 3/25/2025  Plan of Care Certification: 3/25/2025 to 05/23/2025      PT/PTA:     Number of PTA visits since last PT visit:   Time In: 1125   Time Out: 1210  Total Time: 45   Total Billable Time: 45    FOTO:  Intake Score:  %  Survey Score 1:  %  Survey Score 2:  %         Subjective   Pt reports that he has difficulty getting up off of the ground..  Pain reported as 7/10. lower back    Objective            Treatment:          CPT Intervention  Low Back Duration / Intensity  04/15/2025   TE Seated marching  2 x 10   TE Seated knee squeeze 5" x 20   TA Sit to stand 24" box 2 x 10   TE DKTC 10" x 5   TE LTR w/ball 10x   NR TA isometric 10x    NR LAQ 10x    NR  SLR 2 x 10    NR  Sidelying hip abd 2 x 10    NR  clamshells 2 x 10    NR  Bridging 20x   PLAN             CPT Codes available for Billing:   (--) minutes of Manual therapy (MT) to improve pain and ROM.  (45) minutes of Therapeutic Exercise (TE) to develop strength, endurance, range of motion, and flexibility.  (--) minutes of Neuromuscular Re-Education (NR)  to improve: Balance, Coordination, Kinesthetic, Sense, Proprioception, and Posture.  (--) minutes of Therapeutic Activities (TA) to improve functional performance.  Unattended Electrical Stimulation (ES) for muscle performance or pain modulation.  Vasopneumatic Device Therapy () for management of swelling/edema. (35999)  BFR: Blood flow restriction applied during " exercise  NP or (-): Not Performed       Assessment & Plan   Assessment: Pt has continued weakness and poor standing endurance.  He needs continued work on strengthening.  At next visit work on functional activities such as getting up and off of the floor.  Evaluation/Treatment Tolerance: Patient tolerated treatment well    Patient will continue to benefit from skilled outpatient physical therapy to address the deficits listed in the problem list box on initial evaluation, provide pt/family education and to maximize pt's level of independence in the home and community environment.     Patient's spiritual, cultural, and educational needs considered and patient agreeable to plan of care and goals.           Plan:      Goals:   Active       Ambulation/movement       Patient will walk 250 feet in 2 minutes (Ongoing)       Start:  03/25/25    Expected End:  05/23/25               Functional outcome       Patient will show a significant change in FOTO patient-reported outcome tool to demonstrate subjective improvement (Ongoing)       Start:  03/25/25    Expected End:  05/23/25            Patient stated goal: patient would like to not have pain in his lower back and have better balance.  (Ongoing)       Start:  03/25/25    Expected End:  05/23/25            Patient will demonstrate independence in home program for support of progression (Ongoing)       Start:  03/25/25    Expected End:  04/18/25               Pain       Patient will report pain of 5/10 demonstrating a reduction of overall pain (Ongoing)       Start:  03/25/25    Expected End:  05/23/25               Range of Motion       Patient will achieve spinal flexion to 75% (Ongoing)       Start:  03/25/25    Expected End:  05/23/25            Patient will achieve spinal extension to 75% (Ongoing)       Start:  03/25/25    Expected End:  05/23/25                Yolanda Rothman, PT, DPT

## 2025-04-17 ENCOUNTER — CLINICAL SUPPORT (OUTPATIENT)
Dept: REHABILITATION | Facility: HOSPITAL | Age: 65
End: 2025-04-17
Payer: MEDICAID

## 2025-04-17 DIAGNOSIS — R53.1 GENERALIZED WEAKNESS: Primary | ICD-10-CM

## 2025-04-17 PROCEDURE — 97110 THERAPEUTIC EXERCISES: CPT | Mod: PN | Performed by: PHYSICAL THERAPIST

## 2025-04-20 NOTE — PROGRESS NOTES
"  Outpatient Rehab    Physical Therapy Visit    Patient Name: Radha Abbott  MRN: [id number]  YOB: 1960  Encounter Date: 4/17/2025    Therapy Diagnosis:   Encounter Diagnosis   Name Primary?    Generalized weakness Yes     Physician: Walter Mcintyre MD    Physician Orders: Eval and Treat  Medical Diagnosis: LVAD (left ventricular assist device) present  Acute on chronic combined systolic and diastolic CHF, NYHA class 4    Visit # / Visits Authorized:  4 / 20  Insurance Authorization Period: 3/24/2025 to 12/31/2025  Date of Evaluation: 3/25/2025  Plan of Care Certification: 3/25/2025 to 05/23/2025      PT/PTA:     Number of PTA visits since last PT visit:   Time In: 1030   Time Out: 1120  Total Time: 50   Total Billable Time: 38    FOTO:  Intake Score:  %  Survey Score 1:  %  Survey Score 2:  %         Subjective   Pt reports that he is starting to feel a bit stronger.  Pain reported as 7/10. lower back    Objective            Treatment:          CPT Intervention  Low Back Duration / Intensity  04/17/2025 Duration / Intensity  04/15/2025   TE Seated marching  2 x 10 2 x 10   TE Seated knee squeeze 5" x 20 5" x 20   TA Sit to stand 24" box 2  x10 24" box 2 x 10   TE DKTC 10" x 5 10" x 5   TE LTR w/ball 10x 10x   NR TA isometric 10x 10x    NR LAQ 2 x 10 10x    NR  SLR 2 x 10 2 x 10    NR  Sidelying hip abd 2 x 10 2 x 10    NR  clamshells 2 x 10 2 x 10    NR  Bridging 20x 20x   PLAN               CPT Codes available for Billing:   (--) minutes of Manual therapy (MT) to improve pain and ROM.  (38) minutes of Therapeutic Exercise (TE) to develop strength, endurance, range of motion, and flexibility.  (--) minutes of Neuromuscular Re-Education (NR)  to improve: Balance, Coordination, Kinesthetic, Sense, Proprioception, and Posture.  (--) minutes of Therapeutic Activities (TA) to improve functional performance.  Unattended Electrical Stimulation (ES) for muscle performance or pain modulation.  Vasopneumatic " Device Therapy () for management of swelling/edema. (45842)  BFR: Blood flow restriction applied during exercise  NP or (-): Not Performed       Assessment & Plan   Assessment: Pt is progressing well and making improvements with his strength.  Continue with progressing strengthening exercises.  Evaluation/Treatment Tolerance: Patient tolerated treatment well    Patient will continue to benefit from skilled outpatient physical therapy to address the deficits listed in the problem list box on initial evaluation, provide pt/family education and to maximize pt's level of independence in the home and community environment.     Patient's spiritual, cultural, and educational needs considered and patient agreeable to plan of care and goals.           Plan: Continue plan from evaluation.    Goals:   Active       Ambulation/movement       Patient will walk 250 feet in 2 minutes (Progressing)       Start:  03/25/25    Expected End:  05/23/25               Functional outcome       Patient will show a significant change in FOTO patient-reported outcome tool to demonstrate subjective improvement (Progressing)       Start:  03/25/25    Expected End:  05/23/25            Patient stated goal: patient would like to not have pain in his lower back and have better balance.  (Progressing)       Start:  03/25/25    Expected End:  05/23/25            Patient will demonstrate independence in home program for support of progression (Met)       Start:  03/25/25    Expected End:  04/18/25    Resolved:  04/19/25            Pain       Patient will report pain of 5/10 demonstrating a reduction of overall pain (Progressing)       Start:  03/25/25    Expected End:  05/23/25               Range of Motion       Patient will achieve spinal flexion to 75% (Progressing)       Start:  03/25/25    Expected End:  05/23/25            Patient will achieve spinal extension to 75% (Progressing)       Start:  03/25/25    Expected End:  05/23/25                 Yolanda Rothman, PT, DPT

## 2025-04-22 ENCOUNTER — DOCUMENTATION ONLY (OUTPATIENT)
Dept: REHABILITATION | Facility: HOSPITAL | Age: 65
End: 2025-04-22
Payer: MEDICAID

## 2025-04-22 ENCOUNTER — ANTI-COAG VISIT (OUTPATIENT)
Dept: CARDIOLOGY | Facility: CLINIC | Age: 65
End: 2025-04-22
Payer: MEDICAID

## 2025-04-22 ENCOUNTER — HOSPITAL ENCOUNTER (EMERGENCY)
Facility: HOSPITAL | Age: 65
Discharge: HOME | End: 2025-04-22
Attending: EMERGENCY MEDICINE
Payer: MEDICAID

## 2025-04-22 VITALS
BODY MASS INDEX: 22.66 KG/M2 | TEMPERATURE: 98 F | OXYGEN SATURATION: 100 % | HEART RATE: 80 BPM | DIASTOLIC BLOOD PRESSURE: 86 MMHG | RESPIRATION RATE: 17 BRPM | WEIGHT: 171 LBS | HEIGHT: 73 IN | SYSTOLIC BLOOD PRESSURE: 116 MMHG
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
## 2025-04-22 DIAGNOSIS — S09.90XA INJURY OF HEAD, INITIAL ENCOUNTER: Primary | ICD-10-CM

## 2025-04-22 DIAGNOSIS — Z95.811 LVAD (LEFT VENTRICULAR ASSIST DEVICE) PRESENT: Primary | ICD-10-CM

## 2025-04-22 PROCEDURE — 93793 ANTICOAG MGMT PT WARFARIN: CPT | Mod: ,,,

## 2025-04-22 PROCEDURE — 99284 EMERGENCY DEPT VISIT MOD MDM: CPT | Mod: 25

## 2025-04-22 NOTE — PROGRESS NOTES
[facility]    04/22/2025    Radha Abbott (65 y.o.) is followed by the [facility].      Assessment/Plan:    Radha Abbott presents with a subtherapeutic  INR. Goal INR: 2.0-3.0    Lab Results   Component Value Date    INR 1.7 (H) 04/22/2025    INR 2.1 (H) 04/15/2025    INR 2.5 (H) 04/11/2025       Assessment of patient findings per MA/LPN and chart review:   The following significant findings were found:   Patient went to the ER for a head injury after a fall, but no significant findings were found    Recommendation for patient's warfarin regimen:   Due to subtherapeutic INR, patient was instructed to take a booster dose today. Patient to resume their normal weekly dose.    Recommended repeat INR in 2 days      Joycelyn Van Meter, PharmD, BCPS  Clinical Pharmacist - [facility]  Preferred Contact: Secure Messaging or In Basket Message

## 2025-04-22 NOTE — PROGRESS NOTES
Pt did not show for his therapy apt on this date. Per chart review, pt visited the ER around 6:30 this morning. Per progress note: pt was trying to change his battery pack in his LVAD when he tripped and fell. He hit the back of his head. He did not loose consciousness but he does take blood thinners. He was discharged from the ER around 8:30 this morning.

## 2025-04-22 NOTE — PROGRESS NOTES
Arlyn reports correct Warfarin dose, ER visit today due to head injury, no other changes reported.

## 2025-04-22 NOTE — ED PROVIDER NOTES
SCRIBE #1 NOTE: I, Jaden Sorto, am scribing for, and in the presence of, Albert Granger DO. I have scribed the entire note.       History     Chief Complaint   Patient presents with    Fall     Pt was trying to change his battery pack in his LVAD when he tripped and fell. Pt did hit the back of his head. +blood thinners. -LOC     Review of patient's allergies indicates:  No Known Allergies      History of Present Illness     HPI    4/22/2025, 6:38 AM  History obtained from the patient and medical records      History of Present Illness: Radha Abbott is a 65 y.o. male patient with a PMHx of DM type II, CAD, MI, CHF, LVAD, ICD, PAF, and stage 4 CKD who presents to the Emergency Department for evaluation of fall injuries which occurred this morning while the pt was attempting to change his battery pack in the restroom. Pt hit the back of his head. Pt takes Warfarin. Symptoms are constant and moderate in severity. No mitigating or exacerbating factors reported. Patient denies any LOC. No further complaints or concerns at this time.       Arrival mode: Personal Transportation    PCP: Vasu Kong MD        Past Medical History:  Past Medical History:   Diagnosis Date    Aspiration pneumonia of both lungs 06/17/2024    CAD (coronary artery disease)     CHF (congestive heart failure)     Delirium 06/16/2024    Diabetes mellitus     HFrEF (heart failure with reduced ejection fraction)     Hypoglycemia 06/12/2024    ICD (implantable cardioverter-defibrillator) in place     LVAD (left ventricular assist device) present 12/01/2023    MI, old     PAF (paroxysmal atrial fibrillation) 10/11/2023    Stage 4 chronic kidney disease 02/15/2024    Type 2 diabetes mellitus without complication, without long-term current use of insulin 10/07/2023       Past Surgical History:  Past Surgical History:   Procedure Laterality Date    ANGIOPLASTY-VENOUS ARTERY Right 12/1/2023    Procedure: ANGIOPLASTY-VENOUS ARTERY, RIGHT  FEMORAL;  Surgeon: Yuri Washington MD;  Location: [facility] OR 2ND FLR;  Service: Cardiovascular;  Laterality: Right;    AORTIC VALVULOPLASTY N/A 12/1/2023    Procedure: REPAIR, AORTIC VALVE;  Surgeon: Yuri Washington MD;  Location: [facility] OR 2ND FLR;  Service: Cardiovascular;  Laterality: N/A;    CARDIAC SURGERY      CLOSURE N/A 12/1/2023    Procedure: CLOSURE, TEMPORARY;  Surgeon: Yuri Washington MD;  Location: [facility] OR 2ND FLR;  Service: Cardiovascular;  Laterality: N/A;    DRAINAGE OF PLEURAL EFFUSION  12/4/2023    Procedure: DRAINAGE, PLEURAL EFFUSION;  Surgeon: Yuri Washington MD;  Location: [facility] OR [facility] FLR;  Service: Cardiovascular;;    INSERTION OF GRAFT TO PERICARDIUM  12/4/2023    Procedure: INSERTION, GRAFT, PERICARDIUM;  Surgeon: Yuri Washington MD;  Location: [facility] OR [facility] FLR;  Service: Cardiovascular;;    INSERTION OF INTRA-AORTIC BALLOON ASSIST DEVICE Right 11/21/2023    Procedure: INSERTION, INTRA-AORTIC BALLOON PUMP;  Surgeon: Finn Cohn MD;  Location: [facility] CATH LAB;  Service: Cardiology;  Laterality: Right;    LEFT VENTRICULAR ASSIST DEVICE Left 12/1/2023    Procedure: INSERTION-LEFT VENTRICULAR ASSIST DEVICE;  Surgeon: Yuri Washington MD;  [facility];  Service: Cardiovascular;  Laterality: Left;  REDO STERNOTOMY - REDO SAW NEEDED FOR CASE    LYSIS OF ADHESIONS  12/1/2023    Procedure: LYSIS, ADHESIONS;  Surgeon: Yuri Washington MD;  Location: [facility] OR [facility] FLR;  Service: Cardiovascular;;    PLACEMENT OF SWAN ROLANDO CATHETER WITH IMAGING GUIDANCE  11/20/2023    Procedure: INSERTION, CATHETER, SWAN-ROLANDO, WITH IMAGING GUIDANCE;  Surgeon: Sajan Hurley MD;  Location: [facility] CATH LAB;  Service: Cardiology;;    REMOVAL OF TUNNELED CENTRAL VENOUS CATHETER (CVC) N/A 3/1/2024    Procedure: REMOVAL, CATHETER, CENTRAL VENOUS, TUNNELED;  Surgeon: Seble Aguilar MD;  Location: [facility] CATH LAB;  Service: Interventional Nephrology;  Laterality: N/A;    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  12/1/2023    Procedure: REPAIR, ANEURYSM, ARTERY, FEMORAL;  Surgeon: Yuri Washington MD;  [address]  Service: Cardiovascular;  Laterality: Right;  Right Femoral Artery Repair    RIGHT HEART CATHETERIZATION Right 10/10/2023    Procedure: INSERTION, CATHETER, RIGHT HEART;  Surgeon: Bin Gandhi MD;  Location: [facility] CATH LAB;  Service: Cardiology;  Laterality: Right;    RIGHT HEART CATHETERIZATION Right 10/13/2023    Procedure: INSERTION, CATHETER, RIGHT HEART;  Surgeon: Walter Mcintyre MD;  Location: [facility] CATH LAB;  Service: Cardiology;  Laterality: Right;    RIGHT HEART CATHETERIZATION  11/13/2023    RIGHT HEART CATHETERIZATION Right 11/13/2023    Procedure: INSERTION, CATHETER, RIGHT HEART;  Surgeon: Juventino Bermudez Jr., MD;  Location: [facility] CATH LAB;  Service: Cardiology;  Laterality: Right;    RIGHT HEART CATHETERIZATION Right 11/20/2023    Procedure: INSERTION, CATHETER, RIGHT HEART;  Surgeon: Sajan Hurley MD;  Location: [facility] CATH LAB;  Service: Cardiology;  Laterality: Right;    RIGHT HEART CATHETERIZATION Right 1/22/2024    Procedure: INSERTION, CATHETER, RIGHT HEART;  Surgeon: Brayan Ocampo MD;  Location: [facility] CATH LAB;  Service: Cardiology;  Laterality: Right;    STERNAL WOUND CLOSURE N/A 12/4/2023    Procedure: CLOSURE, WOUND, STERNUM;  Surgeon: Yuri Washington MD;  Location: [address];  Service: Cardiovascular;  Laterality: N/A;    STERNOTOMY N/A 12/1/2023    Procedure: STERNOTOMY, REDO;  Surgeon: Yuri Washington MD;  [address]  Service: Cardiovascular;  Laterality: N/A;    VALVULOPLASTY, MITRAL VALVE N/A 12/1/2023    Procedure: VALVULOPLASTY, MITRAL VALVE;  Surgeon: Yuri Washington MD;  Location: [address];  Service: Cardiovascular;  Laterality: N/A;         Family History:  No family history on file.    Social History:  Social History     Tobacco Use    Smoking status: Former     Current packs/day: 0.50     Types: Cigarettes    Smokeless  tobacco: Not on file   Substance and Sexual Activity    Alcohol use: Yes     Comment: rarely    Drug use: No    Sexual activity: Not Currently     Partners: Female        Review of Systems     Review of Systems   HENT:          As stated in the HPI   Neurological:         (+) LOC          Physical Exam     Initial Vitals   BP Pulse Resp Temp SpO2   04/22/25 0632 04/22/25 0632 04/22/25 0632 04/22/25 0632 04/22/25 0700   109/68 70 18 97.9 °F (36.6 °C) 98 %      MAP       --                 Physical Exam  Constitutional:       General: He is not in acute distress.     Appearance: Normal appearance.   HENT:      Head: Normocephalic and atraumatic.   Eyes:      Extraocular Movements: Extraocular movements intact.      Pupils: Pupils are equal, round, and reactive to light.   Neck:      Comments: No midline tenderness to palpation, step-offs, crepitus noted to the cervical spine  Cardiovascular:      Rate and Rhythm: Normal rate and regular rhythm.      Heart sounds: No murmur heard.     Comments: No tenderness to palpation to the chest wall  Pulmonary:      Effort: Pulmonary effort is normal.      Breath sounds: No wheezing.   Abdominal:      General: There is no distension.      Palpations: Abdomen is soft.      Tenderness: There is no abdominal tenderness. There is no guarding.   Musculoskeletal:         General: No swelling, tenderness or deformity. Normal range of motion.      Comments: No tenderness to palpation, step-offs, crepitus noted to the thoracic or lumbar spine   Skin:     General: Skin is warm and dry.      Findings: No lesion.   Neurological:      General: No focal deficit present.      Mental Status: He is alert and oriented to person, place, and time.      Sensory: No sensory deficit.      Motor: No weakness.      Comments: Patient able to ambulate with no difficulty          ED Course   Procedures  ED Vital Signs:  Vitals:    04/22/25 0632 04/22/25 0700 04/22/25 0730 04/22/25 0800   BP: 109/68 105/80  "98/76 116/86   Pulse: 70 70 86 80   Resp: 18 18 18 17   Temp: 97.9 °F (36.6 °C)      SpO2:  98% 100% 100%   Weight: 77.6 kg (171 lb)      Height: 6' 1" (1.854 m)          Abnormal Lab Results:  Labs Reviewed - No data to display     All Lab Results:  None.    Imaging Results:  Imaging Results              CT Head Without Contrast (Final result)  Result time 04/22/25 07:55:55      Final result by Boris Villarreal MD (04/22/25 07:55:55)                   Impression:      Age-related atrophy.  No acute findings.      Electronically signed by: Boris Villarreal MD  Date:    04/22/2025  Time:    07:55               Narrative:    EXAMINATION:  CT HEAD WITHOUT CONTRAST    CLINICAL HISTORY:  Head trauma, moderate-severe;on warfarin;    TECHNIQUE:  Standard noncontrast CT of the brain.    All CT scans at this facility are performed  using dose modulation techniques as appropriate to performed exam including the following:  automated exposure control; adjustment of mA and/or kV according to the patients size (this includes techniques or standardized protocols for targeted exams where dose is matched to indication/reason for exam: i.e. extremities or head);  iterative reconstruction technique.    COMPARISON:  04/02/2025 head CT.    FINDINGS:  Brain: The ventricles are mildly enlarged consistent with volume loss.  Small chronic area of medial left occipital encephalomalacia.    No acute edema, hemorrhage or mass effect is present.    Skull: The skull base is intact.                                                 The Emergency Provider reviewed the vital signs and test results, which are outlined above.     ED Discussion     8:08 AM: Reassessed pt at this time. Discussed with patient and/or family/caretaker all pertinent ED information and results. Discussed pt dx and plan of tx. Gave the patient all f/u and return to the ED instructions. All questions and concerns were addressed at this time. Patient and/or " family/caretaker expresses understanding of information and instructions, and is comfortable with plan to discharge. Pt is stable for discharge.     I discussed with patient and/or family/caretaker that evaluation in the ED does not suggest any emergent or life threatening medical conditions requiring immediate intervention beyond what was provided in the ED, and I believe patient is safe for discharge.  Regardless, an unremarkable evaluation in the ED does not preclude the development or presence of a serious of life threatening condition. As such, I instructed that the patient is to return immediately for any worsening or change in current symptoms.        ED Course as of 04/23/25 0804   Tue Apr 22, 2025   0800 CT Head Without Contrast  Age-related atrophy.  No acute findings. [CD]      ED Course User Index  [CD] Albert Granger, DO     Medical Decision Making  Patient instructed to return immediately for any new or worsening symptoms and he verbalized understanding.    Amount and/or Complexity of Data Reviewed  Radiology: ordered. Decision-making details documented in ED Course.    Risk  Risk Details: Differential diagnosis includes but is not limited to:  Fracture, dislocation, sprain, strain, contusion, concussion, intracerebral hemorrhage                ED Medication(s):  Medications - No data to display    Discharge Medication List as of 4/22/2025  8:07 AM           Follow-up Information       Schedule an appointment as soon as possible for a visit  with Vasu Kong MD.    Specialty: Internal Medicine  Contact information:  [address] [address] [phone]               Go to  [facility] - Emergency Dept..    Specialty: Emergency Medicine  Why: As needed, If symptoms worsen  Contact information:  [address] [facility] [address] [phone]                               Scribe Attestation:   Scribe #1: I performed the  above scribed service and the documentation accurately describes the services I performed. I attest to the accuracy of the note.     Attending:   Physician Attestation Statement for Scribe #1: I, Albert Granger DO, personally performed the services described in this documentation, as scribed by Jaden Sorto, in my presence, and it is both accurate and complete.           Clinical Impression       ICD-10-CM ICD-9-CM   1. Injury of head, initial encounter  S09.90XA 959.01   2. Fall, initial encounter  W19.XXXA E888.9       Disposition:   Disposition: Discharged  Condition: Stable         Albert Granger DO  04/23/25 0807

## 2025-04-24 ENCOUNTER — ANTI-COAG VISIT (OUTPATIENT)
Dept: CARDIOLOGY | Facility: CLINIC | Age: 65
End: 2025-04-24
Payer: MEDICAID

## 2025-04-24 ENCOUNTER — CLINICAL SUPPORT (OUTPATIENT)
Dept: REHABILITATION | Facility: HOSPITAL | Age: 65
End: 2025-04-24
Payer: MEDICAID

## 2025-04-24 ENCOUNTER — LAB VISIT (OUTPATIENT)
Dept: LAB | Facility: HOSPITAL | Age: 65
End: 2025-04-24
Attending: INTERNAL MEDICINE
Payer: MEDICAID

## 2025-04-24 DIAGNOSIS — R29.898 DECREASED STRENGTH OF UPPER EXTREMITY: Primary | ICD-10-CM

## 2025-04-24 DIAGNOSIS — Z95.811 LVAD (LEFT VENTRICULAR ASSIST DEVICE) PRESENT: Primary | ICD-10-CM

## 2025-04-24 DIAGNOSIS — R27.8 DECREASED COORDINATION: ICD-10-CM

## 2025-04-24 DIAGNOSIS — Z95.811 PRESENCE OF VENTRICULAR ASSIST DEVICE: ICD-10-CM

## 2025-04-24 DIAGNOSIS — R53.1 GENERALIZED WEAKNESS: Primary | ICD-10-CM

## 2025-04-24 LAB
INR PPP: 2.1 (ref 0.8–1.2)
PROTHROMBIN TIME: 22.2 SECONDS (ref 9–12.5)

## 2025-04-24 PROCEDURE — 36415 COLL VENOUS BLD VENIPUNCTURE: CPT

## 2025-04-24 PROCEDURE — 97530 THERAPEUTIC ACTIVITIES: CPT | Mod: PN

## 2025-04-24 PROCEDURE — 93793 ANTICOAG MGMT PT WARFARIN: CPT | Mod: ,,,

## 2025-04-24 PROCEDURE — 97112 NEUROMUSCULAR REEDUCATION: CPT | Mod: PN | Performed by: PHYSICAL THERAPIST

## 2025-04-24 PROCEDURE — 97110 THERAPEUTIC EXERCISES: CPT | Mod: PN | Performed by: PHYSICAL THERAPIST

## 2025-04-24 PROCEDURE — 85610 PROTHROMBIN TIME: CPT

## 2025-04-24 NOTE — PROGRESS NOTES
[facility]    04/24/2025 12:12 PM    Assessment/Plan:    Patient presents today with therapeutic INR.    Assessment of patient findings and chart review: no significant findings     Recommendation for patient's warfarin regimen: Continue current maintenance dose    Recommend repeat INR in 1 week  _________________________________________________________________    Radha Abbott (65 y.o.) is followed by the [facility].    Anticoagulation Summary  As of 4/24/2025      INR goal:  2.0-3.0   TTR:  70.7% (1 y)   INR used for dosing:  --   Warfarin maintenance plan:  1.25 mg (2.5 mg x 0.5) every day   Weekly warfarin total:  8.75 mg   Plan last modified:  Jesenia Jensen, PharmD (4/7/2025)   Next INR check:  5/1/2025   Target end date:  --    Indications    LVAD (left ventricular assist device) present [Z95.811]                 Anticoagulation Episode Summary       INR check location:  --    Preferred lab:  --    Send INR reminders to:  [facility] COUMADIN LVAD    Comments:  [place]          Anticoagulation Care Providers       Provider Role Specialty Phone number    Sajan Hurley MD [facility] [phone]

## 2025-04-24 NOTE — PROGRESS NOTES
"  [facility]  Occupational Therapy Treatment Note     Today's Date: 4/24/2025  Name: Radha Abbott  Clinic Number: [id number]    Therapy Diagnosis:   Encounter Diagnoses   Name Primary?    Decreased strength of upper extremity Yes    Decreased coordination        Physician: Brayan Ocampo MD    Physician Orders: OT eval and tx  Medical Diagnosis: LVAD (left ventricular assist device) present [Z95.811], Falls [R29.6]     Evaluation Date: 4/8/2025  Insurance Authorization Period: 4/7/2025 - 12/31/2025  Plan of Care Certification Period: 4/8/2025 to 7/1/2025  Progress Note Due: 5/6/2025     Visit # / Visits authorized: 3/20  FOTO: 1/3    Precautions: LVAD/pacemaker (wears 5# vest) - no lifting >25#, abdominal tube from LVAD, DM, dialysis port (dialysis 3x/week M,W,F)    Time In: 0905  Time Out: 1000  Total Time: 55 minutes  Total Billable Time: 55 minutes    Subjective     Pt reports: "I hit my head on Tuesday. I still have a headache and I'm a little dizzy."    he was compliant with home exercise program.  Response to previous treatment: fair  Functional change: none new on this date    Pain: 0/10 (0/10 last session)  Location: BUE's    Objective   Objective measures updated at progress report unless specified.    Treatment     Radha received the treatments listed below:      therapeutic exercises to develop strength and ROM of BUE's for 55 minutes including:  - UBE x 4 min (unable to tolerate 6 min today)  - bilateral elbow flex/ext AROM with 4# DB's 3x10  - chair push-ups 4x5  - chest press with 3# dowel 3x10  - bilateral shoulder flexion AROM with 3# dowel 2x10  - overhead press with 3# dowel 2x10  - composite gripping with PHE (black spring, 3rd position) 5x10    therapeutic activities to improve functional performance of BUE's for 0 minutes including:  - NT    manual therapy techniques were applied to BUE's for 0 minutes including:  - NT    neuromuscular re-education activities to " improve Coordination and Proprioception of BUE's for 0 minutes including:  - NT    self-care techniques to improve independence and safety with ADL/IADL tasks for 0 minutes including:  - NT    direct contact modalities after being cleared for contraindications for 0 minutes including:  - NT    supervised modalities after being cleared for contradictions for 0 minutes including:  - NT    Home Exercises and Education Provided     Education provided:   - progress toward goals  - importance of rest breaks    Education Recipient:  [x] Patient [] Other recipient present    Patient Learning Style:  [x] Demonstration [x] Listening [] Pictures/video [] Reading [] Tactile    Patient Response to Education:  [x] Demonstrates understanding [x] Verbalizes understanding    [] Requires assistance [] Requires continuing/additional education    Written Home Exercises Provided: Patient instructed to continue previously-issued HEP.  See EMR under Patient Instructions for exercises provided during therapy sessions.      Assessment     Radha was seen for his 3rd tx session on this date. Upon arrival, he reported ongoing headache, dizziness, and weakness since his fall 2 days ago. He had overall decreased tolerance for therapy and required max rest breaks.    Radha is progressing towards his goals and there are no updates to goals at this time. Pt prognosis is Fair.     Radha will continue to benefit from skilled outpatient occupational therapy services to address the deficits listed in the problem list on initial evaluation, to provide pt/family education, and to maximize pt's level of independence in the home and community environment.     Pt's spiritual, cultural and educational needs considered and pt agreeable to plan of care and goals.    Anticipated barriers to occupational therapy: LVAD/pacemaker (wears 5# vest) - no lifting >25#, abdominal tube from LVAD, DM, dialysis port (dialysis 3x/week M,W,F), post COVID, chronic  "condition    Goals:   Active       Fine hand motor use       Patient will complete 9-Hole Peg Test with his right dominant hand in <45 seconds. (Progressing)       Start:  04/08/25    Expected End:  07/01/25               Functional outcome       Pt will report an increase in FOTO intake score of > 55%, which would indicate an improvement in quality of life.  (Progressing)       Start:  04/08/25    Expected End:  07/01/25            Patient stated goal: "get my strength back up"  (Progressing)       Start:  04/08/25    Expected End:  07/01/25            Patient will demonstrate independence in home program for support of progression (Progressing)       Start:  04/08/25    Expected End:  05/06/25               Strength       Patient will achieve bilateral shoulder flexion strength of 4+/5 (Progressing)       Start:  04/08/25    Expected End:  07/01/25            Patient will achieve bilateral shoulder external rotation strength of 4/5 in neutral (Progressing)       Start:  04/08/25    Expected End:  07/01/25            Patient will achieve bilateral elbow flexion strength of 5/5 (Progressing)       Start:  04/08/25    Expected End:  07/01/25            Patient will achieve bilateral wrist flexion strength of 4/5 (Progressing)       Start:  04/08/25    Expected End:  07/01/25            Patient will achieve bilateral wrist extension strength of 4/5 (Progressing)       Start:  04/08/25    Expected End:  07/01/25            Patient will achieve bilateral  strength of 50# in the 2nd position (Progressing)       Start:  04/08/25    Expected End:  07/01/25            Patient will achieve bilateral lateral pinch strength of 9# (Progressing)       Start:  04/08/25    Expected End:  07/01/25                Plan     Continue occupational therapy services per Plan of Care set on evaluation.    Updates/Grading for next session: progress occupational therapy as tolerated    LYUDMILA DOCKERY, CHAUNCEY      "

## 2025-04-24 NOTE — PROGRESS NOTES
"  Outpatient Rehab    Physical Therapy Visit    Patient Name: Radha Abbott  MRN: [id number]  YOB: 1960  Encounter Date: 4/24/2025    Therapy Diagnosis:   Encounter Diagnosis   Name Primary?    Generalized weakness Yes     Physician: Walter Mcintyre MD    Physician Orders: Eval and Treat  Medical Diagnosis: LVAD (left ventricular assist device) present  Acute on chronic combined systolic and diastolic CHF, NYHA class 4    Visit # / Visits Authorized:  5 / 20  Insurance Authorization Period: 3/24/2025 to 12/31/2025  Date of Evaluation: 3/25/2025  Plan of Care Certification: 3/25/2025 to 05/23/2025      PT/PTA:     Number of PTA visits since last PT visit:   Time In: 1000   Time Out: 1038  Total Time: 38   Total Billable Time: 38    FOTO:  Intake Score:  %  Survey Score 1:  %  Survey Score 2:  %         Subjective   Pt reports that he fell on Tuesday morning.  He fell backwards in his wheelchair and hit his head.  He is still recovering from it, he went to the ER and was checked out  and sent back home the same day.  He is currently reporting dizziness and general weakness..         Objective            Treatment:          CPT Intervention  Low Back Duration / Intensity  04/24/2025 Duration / Intensity  04/17/2025 Duration / Intensity  04/15/2025   TE Seated marching  2 x 10 2 x 10 2 x 10   TE Seated knee squeeze 5" x 10 5" x 20 5" x 20   TA Sit to stand   24" box 2  x10 24" box 2 x 10   TE DKTC   10" x 5 10" x 5   TE LTR w/ball   10x 10x   NR TA isometric 10x 10x 10x    NR LAQ 2 x 10 2 x 10 10x   NR SAQ  2 x 10       TE Chin tucks 10x        NR  SLR 2 x 10 2 x 10 2 x 10    NR  Sidelying hip abd   2 x 10 2 x 10    NR  clamshells   2 x 10 2 x 10    NR  Bridging   20x 20x   PLAN                 CPT Codes available for Billing:   (--) minutes of Manual therapy (MT) to improve pain and ROM.  (18) minutes of Therapeutic Exercise (TE) to develop strength, endurance, range of motion, and flexibility.  (20) minutes " of Neuromuscular Re-Education (NR)  to improve: Balance, Coordination, Kinesthetic, Sense, Proprioception, and Posture.  (--) minutes of Therapeutic Activities (TA) to improve functional performance.  Unattended Electrical Stimulation (ES) for muscle performance or pain modulation.  Vasopneumatic Device Therapy () for management of swelling/edema. (21682)  BFR: Blood flow restriction applied during exercise  NP or (-): Not Performed       Assessment & Plan   Assessment: Pt was having dizziness during his treatment due to his recent fall on Tuesday.  LImited therapy exercises to not increase dizziness symptoms.  He needs continued work on strengthening and endurance building in BLEs.  Evaluation/Treatment Tolerance: Patient tolerated treatment well    Patient will continue to benefit from skilled outpatient physical therapy to address the deficits listed in the problem list box on initial evaluation, provide pt/family education and to maximize pt's level of independence in the home and community environment.     Patient's spiritual, cultural, and educational needs considered and patient agreeable to plan of care and goals.           Plan:      Goals:   Active       Ambulation/movement       Patient will walk 250 feet in 2 minutes (Ongoing)       Start:  03/25/25    Expected End:  05/23/25               Functional outcome       Patient will show a significant change in FOTO patient-reported outcome tool to demonstrate subjective improvement (Ongoing)       Start:  03/25/25    Expected End:  05/23/25            Patient stated goal: patient would like to not have pain in his lower back and have better balance.  (Ongoing)       Start:  03/25/25    Expected End:  05/23/25            Patient will demonstrate independence in home program for support of progression (Met)       Start:  03/25/25    Expected End:  04/18/25    Resolved:  04/19/25            Pain       Patient will report pain of 5/10 demonstrating a reduction  of overall pain (Ongoing)       Start:  03/25/25    Expected End:  05/23/25               Range of Motion       Patient will achieve spinal flexion to 75% (Ongoing)       Start:  03/25/25    Expected End:  05/23/25            Patient will achieve spinal extension to 75% (Ongoing)       Start:  03/25/25    Expected End:  05/23/25                Yolanda Rothman PT, DPT

## 2025-04-25 ENCOUNTER — PATIENT MESSAGE (OUTPATIENT)
Dept: TRANSPLANT | Facility: CLINIC | Age: 65
End: 2025-04-25
Payer: MEDICAID

## 2025-04-29 ENCOUNTER — CLINICAL SUPPORT (OUTPATIENT)
Dept: REHABILITATION | Facility: HOSPITAL | Age: 65
End: 2025-04-29
Payer: MEDICAID

## 2025-04-29 DIAGNOSIS — R27.8 DECREASED COORDINATION: ICD-10-CM

## 2025-04-29 DIAGNOSIS — R53.1 GENERALIZED WEAKNESS: Primary | ICD-10-CM

## 2025-04-29 DIAGNOSIS — R29.898 DECREASED STRENGTH OF UPPER EXTREMITY: Primary | ICD-10-CM

## 2025-04-29 PROCEDURE — 97530 THERAPEUTIC ACTIVITIES: CPT | Mod: PN

## 2025-04-29 PROCEDURE — 97110 THERAPEUTIC EXERCISES: CPT | Mod: PN | Performed by: PHYSICAL THERAPIST

## 2025-04-29 NOTE — PROGRESS NOTES
"  [facility]  Occupational Therapy Treatment Note     Today's Date: 4/29/2025  Name: Radha Abbott  Clinic Number: [id number]    Therapy Diagnosis:   Encounter Diagnoses   Name Primary?    Decreased strength of upper extremity Yes    Decreased coordination      Physician: Brayan Ocampo MD    Physician Orders: OT eval and tx  Medical Diagnosis: LVAD (left ventricular assist device) present [Z95.811], Falls [R29.6]     Evaluation Date: 4/8/2025  Insurance Authorization Period: 4/7/2025 - 12/31/2025  Plan of Care Certification Period: 4/8/2025 to 7/1/2025  Progress Note Due: 5/6/2025     Visit # / Visits authorized: 4/20  FOTO: 1/3    Precautions: LVAD/pacemaker (wears 5# vest) - no lifting >25#, abdominal tube from LVAD, DM, dialysis port (dialysis 3x/week M,W,F)    Time In: 0930  Time Out: 1015  Total Time: 45 minutes  Total Billable Time: 45 minutes    Subjective     Pt reports: "I'm doing better today. I still had a little dizziness this morning but I haven't fallen."    he was compliant with home exercise program.  Response to previous treatment: good  Functional change: improved BUE strength and endurance    Pain: 0/10  Location: BUE's    Objective   Objective measures updated at progress report unless specified.    Treatment     Radha received the treatments listed below:      therapeutic exercises to develop strength and ROM of BUE's for 45 minutes including:  - UBE x 6  min   - bilateral elbow flex/ext AROM, forearm 3 positions, with 4# DB's 3x10  - chair push-ups 3x10  - chest press with 4# dowel 3x10  - bilateral shoulder flexion AROM with 4# dowel 3x10  - overhead press with 4# dowel 3x10  - composite gripping with PHE (black spring, 3rd position) 5x10    therapeutic activities to improve functional performance of BUE's for 0 minutes including:  - NT    manual therapy techniques were applied to BUE's for 0 minutes including:  - NT    neuromuscular re-education activities to " improve Coordination and Proprioception of BUE's for 0 minutes including:  - NT    self-care techniques to improve independence and safety with ADL/IADL tasks for 0 minutes including:  - NT    direct contact modalities after being cleared for contraindications for 0 minutes including:  - NT    supervised modalities after being cleared for contradictions for 0 minutes including:  - NT    Home Exercises and Education Provided     Education provided:   - progress toward goals    Education Recipient:  [x] Patient [] Other recipient present    Patient Learning Style:  [x] Demonstration [x] Listening [] Pictures/video [] Reading [] Tactile    Patient Response to Education:  [x] Demonstrates understanding [x] Verbalizes understanding    [] Requires assistance [] Requires continuing/additional education    Written Home Exercises Provided: Patient instructed to continue previously-issued HEP.  See EMR under Patient Instructions for exercises provided during therapy sessions.      Assessment     Radha was seen for his 4th tx session on this date. He tolerated therapy session better and required decreased rest breaks as compared to last session. He also tolerated progressive BUE resistive and loading exercises.     Radha is progressing towards his goals and there are no updates to goals at this time. Pt prognosis is Fair.     Radha will continue to benefit from skilled outpatient occupational therapy services to address the deficits listed in the problem list on initial evaluation, to provide pt/family education, and to maximize pt's level of independence in the home and community environment.     Pt's spiritual, cultural and educational needs considered and pt agreeable to plan of care and goals.    Anticipated barriers to occupational therapy: LVAD/pacemaker (wears 5# vest) - no lifting >25#, abdominal tube from LVAD, DM, dialysis port (dialysis 3x/week M,W,F), post COVID, chronic condition    Goals:   Active       Fine  "hand motor use       Patient will complete 9-Hole Peg Test with his right dominant hand in <45 seconds. (Progressing)       Start:  04/08/25    Expected End:  07/01/25               Functional outcome       Pt will report an increase in FOTO intake score of > 55%, which would indicate an improvement in quality of life.  (Progressing)       Start:  04/08/25    Expected End:  07/01/25            Patient stated goal: "get my strength back up"  (Progressing)       Start:  04/08/25    Expected End:  07/01/25            Patient will demonstrate independence in home program for support of progression (Progressing)       Start:  04/08/25    Expected End:  05/06/25               Strength       Patient will achieve bilateral shoulder flexion strength of 4+/5 (Progressing)       Start:  04/08/25    Expected End:  07/01/25            Patient will achieve bilateral shoulder external rotation strength of 4/5 in neutral (Progressing)       Start:  04/08/25    Expected End:  07/01/25            Patient will achieve bilateral elbow flexion strength of 5/5 (Progressing)       Start:  04/08/25    Expected End:  07/01/25            Patient will achieve bilateral wrist flexion strength of 4/5 (Progressing)       Start:  04/08/25    Expected End:  07/01/25            Patient will achieve bilateral wrist extension strength of 4/5 (Progressing)       Start:  04/08/25    Expected End:  07/01/25            Patient will achieve bilateral  strength of 50# in the 2nd position (Progressing)       Start:  04/08/25    Expected End:  07/01/25            Patient will achieve bilateral lateral pinch strength of 9# (Progressing)       Start:  04/08/25    Expected End:  07/01/25                Plan     Continue occupational therapy services per Plan of Care set on evaluation.    Updates/Grading for next session: progress occupational therapy as tolerated    LYUDMILA DOCKERY CHT    "

## 2025-04-29 NOTE — PROGRESS NOTES
"  Outpatient Rehab    Physical Therapy Visit    Patient Name: Radha Abbott  MRN: [id number]  YOB: 1960  Encounter Date: 4/29/2025    Therapy Diagnosis:   Encounter Diagnosis   Name Primary?    Generalized weakness Yes     Physician: Walter Mcintyre MD    Physician Orders: Eval and Treat  Medical Diagnosis: LVAD (left ventricular assist device) present  Acute on chronic combined systolic and diastolic CHF, NYHA class 4    Visit # / Visits Authorized:  6 / 20  Insurance Authorization Period: 3/24/2025 to 12/31/2025  Date of Evaluation: 3/25/2025  Plan of Care Certification: 3/25/2025 to 05/23/2025      PT/PTA:     Number of PTA visits since last PT visit:   Time In: 1030   Time Out: 1130  Total Time: 60   Total Billable Time: 60    FOTO:  Intake Score:  %  Survey Score 1:  %  Survey Score 2:  %         Subjective   Pt reports that he is feeling better.  But he continues to have some dizziness..  Pain reported as 5/10. lower back    Objective            Treatment:          CPT Intervention  Low Back Duration / Intensity  04/29/2025 Duration / Intensity  04/24/2025 Duration / Intensity  04/17/2025   TE Seated marching  2 x 10 2 x 10 2 x 10   TE Seated knee squeeze 5" x 10 5" x 10 5" x 20   TA Sit to stand 24" box 10x   24" box 2  x10   TE DKTC 10" x 5   10" x 5   TE LTR w/ball 10x   10x   NR TA isometric 10x 10x 10x    NR LAQ 2 x 10 2 x 10 2 x 10   NR SAQ  2 x 10 2 x 10     TE Chin tucks 10x 10x      NR  SLR 2 x 10 2 x 10 2 x 10    NR  Sidelying hip abd 2 x 10   2 x 10    NR  clamshells 2 x 10   2 x 10    NR  Bridging 20x   20x   PLAN                 CPT Codes available for Billing:   (--) minutes of Manual therapy (MT) to improve pain and ROM.  (60) minutes of Therapeutic Exercise (TE) to develop strength, endurance, range of motion, and flexibility.  (--) minutes of Neuromuscular Re-Education (NR)  to improve: Balance, Coordination, Kinesthetic, Sense, Proprioception, and Posture.  (--) minutes of " Therapeutic Activities (TA) to improve functional performance.  Unattended Electrical Stimulation (ES) for muscle performance or pain modulation.  Vasopneumatic Device Therapy () for management of swelling/edema. (86904)  BFR: Blood flow restriction applied during exercise  NP or (-): Not Performed       Assessment & Plan   Assessment: Pt presents with continued dizziness but improved overall.  He was able to tolerated therapy well and get through all of his exercises today without any increased dizziness.  Evaluation/Treatment Tolerance: Patient tolerated treatment well    Patient will continue to benefit from skilled outpatient physical therapy to address the deficits listed in the problem list box on initial evaluation, provide pt/family education and to maximize pt's level of independence in the home and community environment.     Patient's spiritual, cultural, and educational needs considered and patient agreeable to plan of care and goals.           Plan:      Goals:   Active       Ambulation/movement       Patient will walk 250 feet in 2 minutes (Progressing)       Start:  03/25/25    Expected End:  05/23/25               Functional outcome       Patient will show a significant change in FOTO patient-reported outcome tool to demonstrate subjective improvement (Progressing)       Start:  03/25/25    Expected End:  05/23/25            Patient stated goal: patient would like to not have pain in his lower back and have better balance.  (Progressing)       Start:  03/25/25    Expected End:  05/23/25            Patient will demonstrate independence in home program for support of progression (Met)       Start:  03/25/25    Expected End:  04/18/25    Resolved:  04/19/25            Pain       Patient will report pain of 5/10 demonstrating a reduction of overall pain (Progressing)       Start:  03/25/25    Expected End:  05/23/25               Range of Motion       Patient will achieve spinal flexion to 75%  (Progressing)       Start:  03/25/25    Expected End:  05/23/25            Patient will achieve spinal extension to 75% (Progressing)       Start:  03/25/25    Expected End:  05/23/25                Yolanda Rothman PT, DPT

## 2025-05-01 ENCOUNTER — CLINICAL SUPPORT (OUTPATIENT)
Dept: REHABILITATION | Facility: HOSPITAL | Age: 65
End: 2025-05-01
Payer: MEDICAID

## 2025-05-01 ENCOUNTER — LAB VISIT (OUTPATIENT)
Dept: LAB | Facility: HOSPITAL | Age: 65
End: 2025-05-01
Attending: INTERNAL MEDICINE
Payer: MEDICAID

## 2025-05-01 ENCOUNTER — ANTI-COAG VISIT (OUTPATIENT)
Dept: CARDIOLOGY | Facility: CLINIC | Age: 65
End: 2025-05-01
Payer: MEDICAID

## 2025-05-01 DIAGNOSIS — Z95.811 LVAD (LEFT VENTRICULAR ASSIST DEVICE) PRESENT: Primary | ICD-10-CM

## 2025-05-01 DIAGNOSIS — R27.8 DECREASED COORDINATION: ICD-10-CM

## 2025-05-01 DIAGNOSIS — R53.1 GENERALIZED WEAKNESS: Primary | ICD-10-CM

## 2025-05-01 DIAGNOSIS — Z95.811 PRESENCE OF VENTRICULAR ASSIST DEVICE: ICD-10-CM

## 2025-05-01 DIAGNOSIS — R29.898 DECREASED STRENGTH OF UPPER EXTREMITY: Primary | ICD-10-CM

## 2025-05-01 LAB
INR PPP: 1.9 (ref 0.8–1.2)
PROTHROMBIN TIME: 20.9 SECONDS (ref 9–12.5)

## 2025-05-01 PROCEDURE — 85610 PROTHROMBIN TIME: CPT

## 2025-05-01 PROCEDURE — 93793 ANTICOAG MGMT PT WARFARIN: CPT | Mod: ,,,

## 2025-05-01 PROCEDURE — 97110 THERAPEUTIC EXERCISES: CPT | Mod: PN,CQ

## 2025-05-01 PROCEDURE — 97530 THERAPEUTIC ACTIVITIES: CPT | Mod: PN

## 2025-05-01 PROCEDURE — 36415 COLL VENOUS BLD VENIPUNCTURE: CPT

## 2025-05-01 NOTE — PROGRESS NOTES
"  [facility]  Occupational Therapy Treatment Note     Today's Date: 5/1/2025  Name: Radha Abbott  Clinic Number: [id number]    Therapy Diagnosis:   Encounter Diagnoses   Name Primary?    Decreased strength of upper extremity Yes    Decreased coordination      Physician: Brayan Ocampo MD    Physician Orders: OT eval and tx  Medical Diagnosis: LVAD (left ventricular assist device) present [Z95.811], Falls [R29.6]     Evaluation Date: 4/8/2025  Insurance Authorization Period: 4/7/2025 - 12/31/2025  Plan of Care Certification Period: 4/8/2025 to 7/1/2025  Progress Note Due: 5/6/2025     Visit # / Visits authorized: 5/20  FOTO: 1/3    Precautions: LVAD - no lifting >50#, chest tube from LVAD, DM, dialysis port (dialysis 3x/week M,W,F)    Time In: 1210  Time Out: 1320  Total Time: 70 minutes  Total Billable Time: 70 minutes    Subjective     Pt reports: "I'm doing better today. I want to be able to ride my motorcycle."    he was compliant with home exercise program.  Response to previous treatment: good  Functional change: improved tolerance for upper extremity strengthening    Pain: 0/10  Location: BUE's    Objective   Objective measures updated at progress report unless specified.    Treatment     Radha received the treatments listed below:      therapeutic exercises to develop strength and ROM of BUE's for 55 minutes including:  - UBE (level 2.0) x 6 min   - chest press machine with 10# 3x10  - row machine with 15# 3x10  - triceps machine with 35# 3x10   - bilateral elbow flex/ext AROM, forearm 3 positions, with 4# DB's x 15 reps each  - composite gripping with PHE (black spring, 4th position) 5x10    therapeutic activities to improve functional performance of BUE's for 15 minutes including:  - practiced sit<-->stand techniques 3x with min A    manual therapy techniques were applied to BUE's for 0 minutes including:  - NT    neuromuscular re-education activities to improve Coordination " and Proprioception of BUE's for 0 minutes including:  - NT    self-care techniques to improve independence and safety with ADL/IADL tasks for 0 minutes including:  - NT    direct contact modalities after being cleared for contraindications for 0 minutes including:  - NT    supervised modalities after being cleared for contradictions for 0 minutes including:  - NT    Home Exercises and Education Provided     Education provided:   - progress toward goals    Education Recipient:  [x] Patient [] Other recipient present    Patient Learning Style:  [x] Demonstration [x] Listening [] Pictures/video [] Reading [] Tactile    Patient Response to Education:  [x] Demonstrates understanding [x] Verbalizes understanding    [] Requires assistance [] Requires continuing/additional education    Written Home Exercises Provided: Patient instructed to continue previously-issued HEP.  See EMR under Patient Instructions for exercises provided during therapy sessions.      Assessment     Radha was seen for his 5th tx session on this date. He tolerated a slight progression in upper extremity strengthening, but continues to require extended time for all exercises and max rest breaks.     Radha is progressing towards his goals and there are no updates to goals at this time. Pt prognosis is Fair.     Radha will continue to benefit from skilled outpatient occupational therapy services to address the deficits listed in the problem list on initial evaluation, to provide pt/family education, and to maximize pt's level of independence in the home and community environment.     Pt's spiritual, cultural and educational needs considered and pt agreeable to plan of care and goals.    Anticipated barriers to occupational therapy: LVAD - no lifting >50#, chest tube from LVAD, DM, dialysis port (dialysis 3x/week M,W,F), post COVID, chronic condition    Goals:   Active       Fine hand motor use       Patient will complete 9-Hole Peg Test with his right  "dominant hand in <45 seconds. (Progressing)       Start:  04/08/25    Expected End:  07/01/25               Functional outcome       Pt will report an increase in FOTO intake score of > 55%, which would indicate an improvement in quality of life.  (Progressing)       Start:  04/08/25    Expected End:  07/01/25            Patient stated goal: "get my strength back up"  (Progressing)       Start:  04/08/25    Expected End:  07/01/25            Patient will demonstrate independence in home program for support of progression (Progressing)       Start:  04/08/25    Expected End:  05/06/25               Strength       Patient will achieve bilateral shoulder flexion strength of 4+/5 (Progressing)       Start:  04/08/25    Expected End:  07/01/25            Patient will achieve bilateral shoulder external rotation strength of 4/5 in neutral (Progressing)       Start:  04/08/25    Expected End:  07/01/25            Patient will achieve bilateral elbow flexion strength of 5/5 (Progressing)       Start:  04/08/25    Expected End:  07/01/25            Patient will achieve bilateral wrist flexion strength of 4/5 (Progressing)       Start:  04/08/25    Expected End:  07/01/25            Patient will achieve bilateral wrist extension strength of 4/5 (Progressing)       Start:  04/08/25    Expected End:  07/01/25            Patient will achieve bilateral  strength of 50# in the 2nd position (Progressing)       Start:  04/08/25    Expected End:  07/01/25            Patient will achieve bilateral lateral pinch strength of 9# (Progressing)       Start:  04/08/25    Expected End:  07/01/25                Plan     Continue occupational therapy services per Plan of Care set on evaluation.    Updates/Grading for next session: progress occupational therapy as tolerated    LYUDMILA DOCKERY, CHAUNCEY  "

## 2025-05-01 NOTE — PROGRESS NOTES
Arlyn reports correct Warfarin dose, diarrhea 4/29/25 took Imodium and it subsided, no other changes reported.

## 2025-05-01 NOTE — PROGRESS NOTES
"  Outpatient Rehab    Physical Therapy Visit    Patient Name: Radha Abbott  MRN: [id number]  YOB: 1960  Encounter Date: 5/1/2025    Therapy Diagnosis:   Encounter Diagnosis   Name Primary?    Generalized weakness Yes       Physician: Walter Mcintyre MD    Physician Orders: Eval and Treat  Medical Diagnosis: LVAD (left ventricular assist device) present  Acute on chronic combined systolic and diastolic CHF, NYHA class 4    Visit # / Visits Authorized:  7 / 20  Insurance Authorization Period: 3/24/2025 to 12/31/2025  Date of Evaluation: 3/25/2025  Plan of Care Certification: 3/25/2025 to 05/23/2025      PT/PTA: PTA   Number of PTA visits since last PT visit:1  Time In: 1110   Time Out: 1205  Total Time: 55   Total Billable Time: 40    FOTO:  Intake Score: 53%  Survey Score 1:  %  Survey Score 2:  %       Subjective   Patient reports his low back pain is improving.         Objective            Treatment:       CPT Intervention  Low Back Duration / Intensity  05/1/2025 Duration / Intensity  04/24/2025 Duration / Intensity  04/17/2025   TE Seated marching  2 x 10 2 x 10 2 x 10   TE Seated knee squeeze 5" x 10 5" x 10 5" x 20   TA Sit to stand 24" box 10x single UE assist   24" box 2  x10   TE DKTC 20 x 5"   10" x 5   TE LTR w/ball 10x   10x   NR TA isometric 10x 10x 10x    NR LAQ 2 x 10 2 x 10 2 x 10   NR SAQ  2 x 10 2 x 10     TE Chin tucks 10x 10x      NR  SLR 2 x 10 2 x 10 2 x 10    NR  Sidelying hip abd 2 x 10   2 x 10    NR  clamshells 3 x 10   2 x 10    NR  Bridging 20x   20x   PLAN                 CPT Codes available for Billing:   (--) minutes of Manual therapy (MT) to improve pain and ROM.  (40) minutes of Therapeutic Exercise (TE) to develop strength, endurance, range of motion, and flexibility.  (--) minutes of Neuromuscular Re-Education (NR)  to improve: Balance, Coordination, Kinesthetic, Sense, Proprioception, and Posture.  (--) minutes of Therapeutic Activities (TA) to improve functional " performance.  Unattended Electrical Stimulation (ES) for muscle performance or pain modulation.  Vasopneumatic Device Therapy () for management of swelling/edema. (94051)  BFR: Blood flow restriction applied during exercise  NP or (-): Not Performed       Assessment & Plan   Assessment: Patient did not report any complaints of dizziness during today's treatment session. He has increased weakness in right glute med with side lying strengthening exercises. He has to use single hand assist for STS due to lower extremity weakness       Patient will continue to benefit from skilled outpatient physical therapy to address the deficits listed in the problem list box on initial evaluation, provide pt/family education and to maximize pt's level of independence in the home and community environment.     Patient's spiritual, cultural, and educational needs considered and patient agreeable to plan of care and goals.     Education  Education was done with Patient. The patient's learning style includes Demonstration and Listening. The patient Demonstrates understanding and Verbalizes understanding.                 Plan:      Goals:   Active       Ambulation/movement       Patient will walk 250 feet in 2 minutes (Progressing)       Start:  03/25/25    Expected End:  05/23/25               Functional outcome       Patient will show a significant change in FOTO patient-reported outcome tool to demonstrate subjective improvement (Progressing)       Start:  03/25/25    Expected End:  05/23/25            Patient stated goal: patient would like to not have pain in his lower back and have better balance.  (Progressing)       Start:  03/25/25    Expected End:  05/23/25            Patient will demonstrate independence in home program for support of progression (Met)       Start:  03/25/25    Expected End:  04/18/25    Resolved:  04/19/25            Pain       Patient will report pain of 5/10 demonstrating a reduction of overall pain  (Progressing)       Start:  03/25/25    Expected End:  05/23/25               Range of Motion       Patient will achieve spinal flexion to 75% (Progressing)       Start:  03/25/25    Expected End:  05/23/25            Patient will achieve spinal extension to 75% (Progressing)       Start:  03/25/25    Expected End:  05/23/25                  Saira Somers PTA

## 2025-05-01 NOTE — PROGRESS NOTES
[facility]    2025 12:08 PM    Assessment/Plan:    Patient presents today with subtherapeutic  INR.    Assessment of patient findings and chart review: no significant findings     Recommendation for patient's warfarin regimen: Increase maintenance dose    Recommend repeat INR in 1 week  _________________________________________________________________    Radha Abbott (65 y.o.) is followed by the [facility].    Anticoagulation Summary  As of 2025      INR goal:  2.0-3.0   TTR:  70.3% (1 y)   INR used for dosin.9 (2025)   Warfarin maintenance plan:  2.5 mg (2.5 mg x 1) every Thu; 1.25 mg (2.5 mg x 0.5) all other days   Weekly warfarin total:  10 mg   Plan last modified:  Jesenia Jensen, PharmD (2025)   Next INR check:  2025   Target end date:  --    Indications    LVAD (left ventricular assist device) present [Z95.811]                 Anticoagulation Episode Summary       INR check location:  --    Preferred lab:  --    Send INR reminders to:  [facility] LVAD    Comments:  [facility]          Anticoagulation Care Providers       Provider Role Specialty Phone number    Sajan Hurley MD [facility] [phone]

## 2025-05-05 ENCOUNTER — TELEPHONE (OUTPATIENT)
Dept: TRANSPLANT | Facility: CLINIC | Age: 65
End: 2025-05-05
Payer: MEDICAID

## 2025-05-05 NOTE — TELEPHONE ENCOUNTER
Received page from wife. Patient had 2 LFAs this morning while eating breakfast. /64, taking medications with breakfast. Patient asymptomatic. He has been having diarrhea, no fever or abdominal cramps. I gave the OK to take Imodium and drink a gatorade or powerade zero to help with hydration.

## 2025-05-06 ENCOUNTER — CLINICAL SUPPORT (OUTPATIENT)
Dept: REHABILITATION | Facility: HOSPITAL | Age: 65
End: 2025-05-06
Payer: MEDICAID

## 2025-05-06 DIAGNOSIS — R29.898 DECREASED STRENGTH OF UPPER EXTREMITY: Primary | ICD-10-CM

## 2025-05-06 DIAGNOSIS — R27.8 DECREASED COORDINATION: ICD-10-CM

## 2025-05-06 DIAGNOSIS — R53.1 GENERALIZED WEAKNESS: Primary | ICD-10-CM

## 2025-05-06 PROCEDURE — 97110 THERAPEUTIC EXERCISES: CPT | Mod: PN | Performed by: PHYSICAL THERAPIST

## 2025-05-06 PROCEDURE — 97530 THERAPEUTIC ACTIVITIES: CPT | Mod: PN

## 2025-05-06 NOTE — PROGRESS NOTES
"  Outpatient Rehab    Physical Therapy Visit    Patient Name: Radha Abbott  MRN: [id number]  YOB: 1960  Encounter Date: 5/6/2025    Therapy Diagnosis:   Encounter Diagnosis   Name Primary?    Generalized weakness Yes     Physician: Walter Mcintyre MD    Physician Orders: Eval and Treat  Medical Diagnosis: LVAD (left ventricular assist device) present  Acute on chronic combined systolic and diastolic CHF, NYHA class 4    Visit # / Visits Authorized:  8 / 20  Insurance Authorization Period: 3/24/2025 to 12/31/2025  Date of Evaluation: 3/25/2025  Plan of Care Certification: 3/25/2025 to 05/23/2025      PT/PTA:     Number of PTA visits since last PT visit:   Time In: 1030   Time Out: 1140  Total Time (in minutes): 70   Total Billable Time (in minutes): 70    FOTO:  Intake Score:  %  Survey Score 2:  %  Survey Score 3:  %         Subjective   Pt reports that he is doing better than yesterday.  He was feeling so weak yesterday and could hardly walk across the room..  Pain reported as 0/10.      Objective            Treatment:          CPT Intervention  Low Back Duration / Intensity  05/06/2025 Duration / Intensity  04/29/2025 Duration / Intensity  04/24/2025   TE Seated marching  2 x 10 2 x 10 2 x 10   TE Seated knee squeeze 5" x 10 5" x 10 5" x 10   TA Sit to stand   24" box 10x     TE DKTC 10" x 5 10" x 5     TE LTR w/ball 10x 10x     NR TA isometric 10x 10x 10x   NR Quad sets 5" x 20        NR LAQ 2 x 10 2 x 10 2 x 10   NR SAQ  20x 2 x 10 2 x 10   TE Chin tucks 10x 10x 10x    NR  SLR 2 x 10 2 x 10 2 x 10    NR  Sidelying hip abd   2 x 10      NR  clamshells   2 x 10      NR  Bridging 20x 20x     PLAN                 CPT Codes available for Billing:   (--) minutes of Manual therapy (MT) to improve pain and ROM.  (70) minutes of Therapeutic Exercise (TE) to develop strength, endurance, range of motion, and flexibility.  (--) minutes of Neuromuscular Re-Education (NR)  to improve: Balance, Coordination, " Kinesthetic, Sense, Proprioception, and Posture.  (--) minutes of Therapeutic Activities (TA) to improve functional performance.  Unattended Electrical Stimulation (ES) for muscle performance or pain modulation.  Vasopneumatic Device Therapy () for management of swelling/edema. (33254)  BFR: Blood flow restriction applied during exercise  NP or (-): Not Performed       Assessment & Plan   Assessment: Pt presents with continued fatigue.  He needed tactile and verbal cuing to perform exercises correctly.  He had increased fatigue after his treatment and had trouble with sitting in a seat properly.  Continue wiht progressing endurance building and strengthening.  Evaluation/Treatment Tolerance: Patient limited by fatigue    Patient will continue to benefit from skilled outpatient physical therapy to address the deficits listed in the problem list box on initial evaluation, provide pt/family education and to maximize pt's level of independence in the home and community environment.     Patient's spiritual, cultural, and educational needs considered and patient agreeable to plan of care and goals.           Plan: Continue plan from evaluation.    Goals:   Active       Ambulation/movement       Patient will walk 250 feet in 2 minutes (Progressing)       Start:  03/25/25    Expected End:  05/23/25               Functional outcome       Patient will show a significant change in FOTO patient-reported outcome tool to demonstrate subjective improvement (Progressing)       Start:  03/25/25    Expected End:  05/23/25            Patient stated goal: patient would like to not have pain in his lower back and have better balance.  (Progressing)       Start:  03/25/25    Expected End:  05/23/25            Patient will demonstrate independence in home program for support of progression (Met)       Start:  03/25/25    Expected End:  04/18/25    Resolved:  04/19/25            Pain       Patient will report pain of 5/10 demonstrating a  reduction of overall pain (Progressing)       Start:  03/25/25    Expected End:  05/23/25               Range of Motion       Patient will achieve spinal flexion to 75% (Progressing)       Start:  03/25/25    Expected End:  05/23/25            Patient will achieve spinal extension to 75% (Progressing)       Start:  03/25/25    Expected End:  05/23/25                Yolanda Rothman PT, DPT

## 2025-05-06 NOTE — PROGRESS NOTES
"  [facility]  Occupational Therapy Treatment Note & Progress Note     Today's Date: 5/6/2025  Name: Radha Abbott  Clinic Number: [id number]    Therapy Diagnosis:   Encounter Diagnoses   Name Primary?    Decreased strength of upper extremity Yes    Decreased coordination      Physician: Brayan Ocampo MD    Physician Orders: OT eval and tx  Medical Diagnosis: LVAD (left ventricular assist device) present [Z95.811], Falls [R29.6]     Evaluation Date: 4/8/2025  Insurance Authorization Period: 4/7/2025 - 12/31/2025  Plan of Care Certification Period: 4/8/2025 to 7/1/2025  Progress Note Due: 6/3/2025     Visit # / Visits authorized: 6/20  FOTO: 2/3    Precautions: LVAD - no lifting >50#, chest tube from LVAD, DM, dialysis port (dialysis 3x/week M,W,F)    Time In: 1000  Time Out: 1100  Total Time: 60 minutes  Total Billable Time: 60 minutes    Subjective     Pt reports: "I'm doing ok. I've had those tremors in my hands for about 2 years."    he was compliant with home exercise program.  Response to previous treatment: good  Functional change: improved tolerance for upper extremity strengthening    Pain: 0/10  Location: [facility]    Objective   Objective measures updated on this date.    Manual Muscles Test:   Right Right Left Left   Strength 4/8/2025 5/6/2025 4/8/2025 5/6/2025   Shoulder flexion 4/5 4+/5 4/5 4/5   Shoulder abduction 4+/5 4+/5 4+/5 4+/5   Shoulder internal rotation  (Shoulder adducted at side) 4+/5 5/5 4+/5 5/5   Shoulder external rotation  (Shoulder adducted at side) 3+/5 4/5 3+/5 4/5   Elbow flexion 4+/5 5/5 4+/5 4+/5   Elbow extension 4/5 4/5 4/5 4/5   Wrist flexion 3+/5 4/5 3+/5 4/5   Wrist extension 3+/5 4/5 3+/5 4/5   Tested in a sitting position                                   and Pinch Strength (in pounds, psi's):   Right Right Left Left    4/8/2025 5/6/2025 4/8/2025 5/6/2025    II 44 41 42 40   Key / Lateral 7 8 8 7   3pt / Tripod 7 9 6 7   2pt / Tip 3 5 2 4 "       Fine Motor Coordination: 9-Hole Peg Test  Right Right Left Left   4/8/2025 5/6/202 4/8/2025 5/6/2025   52 sec 50 sec 52 sec 56 sec   Bilateral hand tremors during test  Pt utilizing a lateral pinch with left hand      Intake Outcome Measure for FOTO Hand Survey    Therapist reviewed FOTO scores for Radha Abbott on 5/6/2025.   FOTO documents entered into [employer] - see Media section.    Intake Score: 57%  - 53% on evaluation on 4/8/2025     Predicted Functional Score: 61%     Treatment     Radha received the treatments listed below:      therapeutic exercises to develop strength and ROM of BUE's for 50 minutes including:  - reassessment  - UBE (level 2.0) x 6 min   - bilateral elbow flex/ext AROM, forearm 3 positions, with 4# DB's x 15 reps each  - composite gripping with PHE (black spring, 4th position) 5x10  - chair push-ups 4x5    therapeutic activities to improve functional performance of BUE's for 10 minutes including:  - practiced sit<-->stand techniques 2x with min A    manual therapy techniques were applied to BUE's for 0 minutes including:  - NT    neuromuscular re-education activities to improve Coordination and Proprioception of BUE's for 0 minutes including:  - NT    self-care techniques to improve independence and safety with ADL/IADL tasks for 0 minutes including:  - NT    direct contact modalities after being cleared for contraindications for 0 minutes including:  - NT    supervised modalities after being cleared for contradictions for 0 minutes including:  - NT    Home Exercises and Education Provided     Education provided:   - progress toward goals    Education Recipient:  [x] Patient [] Other recipient present    Patient Learning Style:  [x] Demonstration [x] Listening [] Pictures/video [] Reading [] Tactile    Patient Response to Education:  [x] Demonstrates understanding [x] Verbalizes understanding    [] Requires assistance [] Requires continuing/additional education    Written Home Exercises  "Provided: Patient instructed to continue previously-issued HEP.  See EMR under Patient Instructions for exercises provided during therapy sessions.      Assessment     Radha was seen for his 6th tx session on this date. Objective measures updated and progress note completed. Overall, he is starting to exhibit improved BUE strength and endurance. However, he continues to be limited by bilateral hand essential tremors, decreased bilateral hand /pinch, and decreased bilateral hand coordination. He also continues to require moderate rest breaks throughout sessions. Therefore, he will continue to benefit from skilled occupational therapy services to address these deficits.     Radha is progressing towards his goals and there are no updates to goals at this time. Pt prognosis is Fair.     Radha will continue to benefit from skilled outpatient occupational therapy services to address the deficits listed in the problem list on initial evaluation, to provide pt/family education, and to maximize pt's level of independence in the home and community environment.     Pt's spiritual, cultural and educational needs considered and pt agreeable to plan of care and goals.    Anticipated barriers to occupational therapy: LVAD - no lifting >50#, chest tube from LVAD, DM, dialysis port (dialysis 3x/week M,W,F), post COVID, chronic condition    Goals:   Active       Fine hand motor use       Patient will complete 9-Hole Peg Test with his right dominant hand in <45 seconds. (Progressing)       Start:  04/08/25    Expected End:  07/01/25               Functional outcome       Pt will report an increase in FOTO intake score of > 55%, which would indicate an improvement in quality of life.  (Progressing)       Start:  04/08/25    Expected End:  07/01/25            Patient stated goal: "get my strength back up"  (Progressing)       Start:  04/08/25    Expected End:  07/01/25            Patient will demonstrate independence in home " program for support of progression (Progressing)       Start:  04/08/25    Expected End:  05/06/25               Strength       Patient will achieve bilateral shoulder flexion strength of 4+/5 (Progressing)       Start:  04/08/25    Expected End:  07/01/25            Patient will achieve bilateral shoulder external rotation strength of 4/5 in neutral (Progressing)       Start:  04/08/25    Expected End:  07/01/25            Patient will achieve bilateral elbow flexion strength of 5/5 (Progressing)       Start:  04/08/25    Expected End:  07/01/25            Patient will achieve bilateral wrist flexion strength of 4/5 (Progressing)       Start:  04/08/25    Expected End:  07/01/25            Patient will achieve bilateral wrist extension strength of 4/5 (Progressing)       Start:  04/08/25    Expected End:  07/01/25            Patient will achieve bilateral  strength of 50# in the 2nd position (Progressing)       Start:  04/08/25    Expected End:  07/01/25            Patient will achieve bilateral lateral pinch strength of 9# (Progressing)       Start:  04/08/25    Expected End:  07/01/25                Plan     Continue occupational therapy services per Plan of Care set on evaluation.    Updates/Grading for next session: progress occupational therapy as tolerated    LYUDMILA DOCKERY, PARULT

## 2025-05-08 ENCOUNTER — CLINICAL SUPPORT (OUTPATIENT)
Dept: REHABILITATION | Facility: HOSPITAL | Age: 65
End: 2025-05-08
Payer: MEDICAID

## 2025-05-08 ENCOUNTER — ANTI-COAG VISIT (OUTPATIENT)
Dept: CARDIOLOGY | Facility: CLINIC | Age: 65
End: 2025-05-08
Payer: MEDICAID

## 2025-05-08 ENCOUNTER — LAB VISIT (OUTPATIENT)
Dept: LAB | Facility: HOSPITAL | Age: 65
End: 2025-05-08
Attending: INTERNAL MEDICINE
Payer: MEDICAID

## 2025-05-08 DIAGNOSIS — R27.8 DECREASED COORDINATION: ICD-10-CM

## 2025-05-08 DIAGNOSIS — Z95.811 PRESENCE OF VENTRICULAR ASSIST DEVICE: ICD-10-CM

## 2025-05-08 DIAGNOSIS — R29.898 DECREASED STRENGTH OF UPPER EXTREMITY: Primary | ICD-10-CM

## 2025-05-08 DIAGNOSIS — Z95.811 LVAD (LEFT VENTRICULAR ASSIST DEVICE) PRESENT: Primary | ICD-10-CM

## 2025-05-08 LAB
INR PPP: 2 (ref 0.8–1.2)
PROTHROMBIN TIME: 21.7 SECONDS (ref 9–12.5)

## 2025-05-08 PROCEDURE — 85610 PROTHROMBIN TIME: CPT

## 2025-05-08 PROCEDURE — 36415 COLL VENOUS BLD VENIPUNCTURE: CPT

## 2025-05-08 PROCEDURE — 93793 ANTICOAG MGMT PT WARFARIN: CPT | Mod: ,,,

## 2025-05-08 NOTE — PROGRESS NOTES
[facility]    2025 11:40 AM    Assessment/Plan:    Patient presents today with therapeutic INR.    Assessment of patient findings and chart review: no significant findings     Recommendation for patient's warfarin regimen: Continue current maintenance dose    Recommend repeat INR in 1 week  _________________________________________________________________    Emmanuelleaimee Abbott (65 y.o.) is followed by the [facility].    Anticoagulation Summary  As of 2025      INR goal:  2.0-3.0   TTR:  69.0% (1 y)   INR used for dosin.0 (2025)   Warfarin maintenance plan:  2.5 mg (2.5 mg x 1) every Thu; 1.25 mg (2.5 mg x 0.5) all other days   Weekly warfarin total:  10 mg   Plan last modified:  Jesenia Jensen, PharmD (2025)   Next INR check:  5/15/2025   Target end date:  --    Indications    LVAD (left ventricular assist device) present [Z95.811]                 Anticoagulation Episode Summary       INR check location:  --    Preferred lab:  --    Send INR reminders to:  [facility] LVAD    Comments:  [place]          Anticoagulation Care Providers       Provider Role Specialty Phone number    Sajan Hurley MD [facility] Cardiology [phone]

## 2025-05-08 NOTE — PROGRESS NOTES
"  [facility]  Occupational Therapy Treatment Note     Today's Date: 5/8/2025  Name: Radha Abbott  Clinic Number: [id number]    Therapy Diagnosis:   Encounter Diagnoses   Name Primary?    Decreased strength of upper extremity Yes    Decreased coordination      Physician: Brayan Ocampo MD    Physician Orders: OT eval and tx  Medical Diagnosis: LVAD (left ventricular assist device) present [Z95.811], Falls [R29.6]     Evaluation Date: 4/8/2025  Insurance Authorization Period: 4/7/2025 - 12/31/2025  Plan of Care Certification Period: 4/8/2025 to 7/1/2025  Progress Note Due: 6/3/2025     Visit # / Visits authorized: 7/20  FOTO: 2/3    Precautions: LVAD - no lifting >50#, chest tube from LVAD, DM, dialysis port (dialysis 3x/week M,W,F)    Time In: ***  Time Out: 1100  Total Time: 60 minutes  Total Billable Time: 60 minutes    Subjective     Pt reports: "I'm doing ok. I've had those tremors in my hands for about 2 years."    he was compliant with home exercise program.  Response to previous treatment: good  Functional change: improved tolerance for upper extremity strengthening    Pain: 0/10  Location: [facility]    Objective   Objective measures updated on this date.    Manual Muscles Test:   Right Right Left Left   Strength 4/8/2025 5/6/2025 4/8/2025 5/6/2025   Shoulder flexion 4/5 4+/5 4/5 4/5   Shoulder abduction 4+/5 4+/5 4+/5 4+/5   Shoulder internal rotation  (Shoulder adducted at side) 4+/5 5/5 4+/5 5/5   Shoulder external rotation  (Shoulder adducted at side) 3+/5 4/5 3+/5 4/5   Elbow flexion 4+/5 5/5 4+/5 4+/5   Elbow extension 4/5 4/5 4/5 4/5   Wrist flexion 3+/5 4/5 3+/5 4/5   Wrist extension 3+/5 4/5 3+/5 4/5   Tested in a sitting position                                   and Pinch Strength (in pounds, psi's):   Right Right Left Left    4/8/2025 5/6/2025 4/8/2025 5/6/2025    II 44 41 42 40   Key / Lateral 7 8 8 7   3pt / Tripod 7 9 6 7   2pt / Tip 3 5 2 4       Fine Motor " Coordination: 9-Hole Peg Test  Right Right Left Left   4/8/2025 5/6/202 4/8/2025 5/6/2025   52 sec 50 sec 52 sec 56 sec   Bilateral hand tremors during test  Pt utilizing a lateral pinch with left hand      Intake Outcome Measure for FOTO Hand Survey    Therapist reviewed FOTO scores for Radha Abbott on 5/6/2025.   FOTO documents entered into [employer] - see Media section.    Intake Score: 57%  - 53% on evaluation on 4/8/2025     Predicted Functional Score: 61%     Treatment     Radha received the treatments listed below:      therapeutic exercises to develop strength and ROM of BUE's for 50 minutes including:  - reassessment  - UBE (level 2.0) x 6 min   - bilateral elbow flex/ext AROM, forearm 3 positions, with 4# DB's x 15 reps each  - composite gripping with PHE (black spring, 4th position) 5x10  - chair push-ups 4x5    therapeutic activities to improve functional performance of BUE's for 10 minutes including:  - practiced sit<-->stand techniques 2x with min A    manual therapy techniques were applied to BUE's for 0 minutes including:  - NT    neuromuscular re-education activities to improve Coordination and Proprioception of BUE's for 0 minutes including:  - NT    self-care techniques to improve independence and safety with ADL/IADL tasks for 0 minutes including:  - NT    direct contact modalities after being cleared for contraindications for 0 minutes including:  - NT    supervised modalities after being cleared for contradictions for 0 minutes including:  - NT    Home Exercises and Education Provided     Education provided:   - progress toward goals    Education Recipient:  [x] Patient [] Other recipient present    Patient Learning Style:  [x] Demonstration [x] Listening [] Pictures/video [] Reading [] Tactile    Patient Response to Education:  [x] Demonstrates understanding [x] Verbalizes understanding    [] Requires assistance [] Requires continuing/additional education    Written Home Exercises Provided: Patient  "instructed to continue previously-issued HEP.  See EMR under Patient Instructions for exercises provided during therapy sessions.      Assessment     Radha was seen for his 6th tx session on this date. Objective measures updated and progress note completed. Overall, he is starting to exhibit improved BUE strength and endurance. However, he continues to be limited by bilateral hand essential tremors, decreased bilateral hand /pinch, and decreased bilateral hand coordination. He also continues to require moderate rest breaks throughout sessions. Therefore, he will continue to benefit from skilled occupational therapy services to address these deficits.     Radha is progressing towards his goals and there are no updates to goals at this time. Pt prognosis is Fair.     Radha will continue to benefit from skilled outpatient occupational therapy services to address the deficits listed in the problem list on initial evaluation, to provide pt/family education, and to maximize pt's level of independence in the home and community environment.     Pt's spiritual, cultural and educational needs considered and pt agreeable to plan of care and goals.    Anticipated barriers to occupational therapy: LVAD - no lifting >50#, chest tube from LVAD, DM, dialysis port (dialysis 3x/week M,W,F), post COVID, chronic condition    Goals:   Active       Fine hand motor use       Patient will complete 9-Hole Peg Test with his right dominant hand in <45 seconds. (Progressing)       Start:  04/08/25    Expected End:  07/01/25               Functional outcome       Pt will report an increase in FOTO intake score of > 55%, which would indicate an improvement in quality of life.  (Met)       Start:  04/08/25    Expected End:  07/01/25    Resolved:  05/06/25         Patient stated goal: "get my strength back up"  (Progressing)       Start:  04/08/25    Expected End:  07/01/25            Patient will demonstrate independence in home program " for support of progression (Met)       Start:  04/08/25    Expected End:  05/06/25    Resolved:  05/06/25            Strength       Patient will achieve bilateral shoulder flexion strength of 4+/5 (Progressing)       Start:  04/08/25    Expected End:  07/01/25            Patient will achieve bilateral shoulder external rotation strength of 4/5 in neutral (Met)       Start:  04/08/25    Expected End:  07/01/25    Resolved:  05/06/25         Patient will achieve bilateral elbow flexion strength of 5/5 (Progressing)       Start:  04/08/25    Expected End:  07/01/25            Patient will achieve bilateral wrist flexion strength of 4/5 (Met)       Start:  04/08/25    Expected End:  07/01/25    Resolved:  05/06/25         Patient will achieve bilateral wrist extension strength of 4/5 (Met)       Start:  04/08/25    Expected End:  07/01/25    Resolved:  05/06/25         Patient will achieve bilateral  strength of 50# in the 2nd position (Progressing)       Start:  04/08/25    Expected End:  07/01/25            Patient will achieve bilateral lateral pinch strength of 9# (Progressing)       Start:  04/08/25    Expected End:  07/01/25                Plan     Continue occupational therapy services per Plan of Care set on evaluation.    Updates/Grading for next session: progress occupational therapy as tolerated    LYUDMILA DOCKERY, PARULT

## 2025-05-08 NOTE — PROGRESS NOTES
Wife will advise patient to continue coumadin dose as per calendar. Patients wife verbalized understanding.

## 2025-05-08 NOTE — PROGRESS NOTES
Pt was scheduled for therapy today and arrived for his apt, but due to a personal accident, he had to cancel and leave for the day.

## 2025-05-09 ENCOUNTER — TELEPHONE (OUTPATIENT)
Dept: TRANSPLANT | Facility: CLINIC | Age: 65
End: 2025-05-09
Payer: MEDICAID

## 2025-05-09 ENCOUNTER — HOSPITAL ENCOUNTER (INPATIENT)
Facility: HOSPITAL | Age: 65
LOS: 2 days | Discharge: HOME | DRG: 377 | End: 2025-05-11
Attending: STUDENT IN AN ORGANIZED HEALTH CARE EDUCATION/TRAINING PROGRAM | Admitting: INTERNAL MEDICINE
Payer: MEDICAID

## 2025-05-09 DIAGNOSIS — R53.83 FATIGUE: ICD-10-CM

## 2025-05-09 DIAGNOSIS — K92.2 GI BLEED: ICD-10-CM

## 2025-05-09 DIAGNOSIS — D64.9 ANEMIA REQUIRING TRANSFUSIONS: Primary | ICD-10-CM

## 2025-05-09 DIAGNOSIS — A04.72 CLOSTRIDIUM DIFFICILE DIARRHEA: ICD-10-CM

## 2025-05-09 DIAGNOSIS — Z95.811 LVAD (LEFT VENTRICULAR ASSIST DEVICE) PRESENT: ICD-10-CM

## 2025-05-09 LAB
ABSOLUTE EOSINOPHIL (OHS): 0.1 K/UL
ABSOLUTE MONOCYTE (OHS): 0.23 K/UL (ref 0.3–1)
ABSOLUTE NEUTROPHIL COUNT (OHS): 3.21 K/UL (ref 1.8–7.7)
ALBUMIN SERPL BCP-MCNC: 2.4 G/DL (ref 3.5–5.2)
ALP SERPL-CCNC: 84 UNIT/L (ref 40–150)
ALT SERPL W/O P-5'-P-CCNC: 15 UNIT/L (ref 10–44)
ANION GAP (OHS): 9 MMOL/L (ref 8–16)
AST SERPL-CCNC: 23 UNIT/L (ref 11–45)
BASOPHILS # BLD AUTO: 0.02 K/UL
BASOPHILS NFR BLD AUTO: 0.5 %
BILIRUB SERPL-MCNC: 0.4 MG/DL (ref 0.1–1)
BUN SERPL-MCNC: 15 MG/DL (ref 8–23)
CALCIUM SERPL-MCNC: 8 MG/DL (ref 8.7–10.5)
CHLORIDE SERPL-SCNC: 103 MMOL/L (ref 95–110)
CO2 SERPL-SCNC: 27 MMOL/L (ref 23–29)
CREAT SERPL-MCNC: 3.6 MG/DL (ref 0.5–1.4)
ERYTHROCYTE [DISTWIDTH] IN BLOOD BY AUTOMATED COUNT: 19 % (ref 11.5–14.5)
FERRITIN SERPL-MCNC: 3825 NG/ML (ref 20–300)
GFR SERPLBLD CREATININE-BSD FMLA CKD-EPI: 18 ML/MIN/1.73/M2
GLUCOSE SERPL-MCNC: 109 MG/DL (ref 70–110)
HCT VFR BLD AUTO: 23.4 % (ref 40–54)
HGB BLD-MCNC: 6.8 GM/DL (ref 14–18)
IMM GRANULOCYTES # BLD AUTO: 0.03 K/UL (ref 0–0.04)
IMM GRANULOCYTES NFR BLD AUTO: 0.7 % (ref 0–0.5)
INDIRECT COOMBS: NORMAL
INFLUENZA A MOLECULAR (OHS): NEGATIVE
INFLUENZA B MOLECULAR (OHS): NEGATIVE
INR PPP: 2.2 (ref 0.8–1.2)
IRON SATN MFR SERPL: 55 % (ref 20–50)
IRON SERPL-MCNC: 107 UG/DL (ref 45–160)
LYMPHOCYTES # BLD AUTO: 0.62 K/UL (ref 1–4.8)
MAGNESIUM SERPL-MCNC: 1.7 MG/DL (ref 1.6–2.6)
MCH RBC QN AUTO: 23.9 PG (ref 27–31)
MCHC RBC AUTO-ENTMCNC: 29.1 G/DL (ref 32–36)
MCV RBC AUTO: 82 FL (ref 82–98)
NUCLEATED RBC (/100WBC) (OHS): 0 /100 WBC
OHS QRS DURATION: 180 MS
OHS QTC CALCULATION: 650 MS
PLATELET # BLD AUTO: 240 K/UL (ref 150–450)
PMV BLD AUTO: 10 FL (ref 9.2–12.9)
POTASSIUM SERPL-SCNC: 3.2 MMOL/L (ref 3.5–5.1)
PROT SERPL-MCNC: 6.8 GM/DL (ref 6–8.4)
PROTHROMBIN TIME: 22.6 SECONDS (ref 9–12.5)
RBC # BLD AUTO: 2.85 M/UL (ref 4.6–6.2)
RELATIVE EOSINOPHIL (OHS): 2.4 %
RELATIVE LYMPHOCYTE (OHS): 14.7 % (ref 18–48)
RELATIVE MONOCYTE (OHS): 5.5 % (ref 4–15)
RELATIVE NEUTROPHIL (OHS): 76.2 % (ref 38–73)
RH BLD: NORMAL
SARS-COV-2 RDRP RESP QL NAA+PROBE: NEGATIVE
SODIUM SERPL-SCNC: 139 MMOL/L (ref 136–145)
SPECIMEN OUTDATE: NORMAL
TIBC SERPL-MCNC: 194 UG/DL (ref 250–450)
TRANSFERRIN SERPL-MCNC: 131 MG/DL (ref 200–375)
WBC # BLD AUTO: 4.21 K/UL (ref 3.9–12.7)

## 2025-05-09 PROCEDURE — U0002 COVID-19 LAB TEST NON-CDC: HCPCS | Performed by: PHYSICIAN ASSISTANT

## 2025-05-09 PROCEDURE — 80053 COMPREHEN METABOLIC PANEL: CPT | Performed by: PHYSICIAN ASSISTANT

## 2025-05-09 PROCEDURE — 83735 ASSAY OF MAGNESIUM: CPT | Performed by: PHYSICIAN ASSISTANT

## 2025-05-09 PROCEDURE — 25000003 PHARM REV CODE 250: Performed by: STUDENT IN AN ORGANIZED HEALTH CARE EDUCATION/TRAINING PROGRAM

## 2025-05-09 PROCEDURE — 84466 ASSAY OF TRANSFERRIN: CPT

## 2025-05-09 PROCEDURE — 87502 INFLUENZA DNA AMP PROBE: CPT | Performed by: PHYSICIAN ASSISTANT

## 2025-05-09 PROCEDURE — 82728 ASSAY OF FERRITIN: CPT

## 2025-05-09 PROCEDURE — 99223 1ST HOSP IP/OBS HIGH 75: CPT | Mod: 25,,, | Performed by: INTERNAL MEDICINE

## 2025-05-09 PROCEDURE — 85025 COMPLETE CBC W/AUTO DIFF WBC: CPT | Performed by: PHYSICIAN ASSISTANT

## 2025-05-09 PROCEDURE — 20600001 HC STEP DOWN PRIVATE ROOM

## 2025-05-09 PROCEDURE — 93750 INTERROGATION VAD IN PERSON: CPT | Mod: ,,, | Performed by: INTERNAL MEDICINE

## 2025-05-09 PROCEDURE — 30233N1 TRANSFUSION OF NONAUTOLOGOUS RED BLOOD CELLS INTO PERIPHERAL VEIN, PERCUTANEOUS APPROACH: ICD-10-PCS | Performed by: INTERNAL MEDICINE

## 2025-05-09 PROCEDURE — P9016 RBC LEUKOCYTES REDUCED: HCPCS | Performed by: PHYSICIAN ASSISTANT

## 2025-05-09 PROCEDURE — 86920 COMPATIBILITY TEST SPIN: CPT | Performed by: PHYSICIAN ASSISTANT

## 2025-05-09 PROCEDURE — 27000248 HC VAD-ADDITIONAL DAY

## 2025-05-09 PROCEDURE — 93005 ELECTROCARDIOGRAM TRACING: CPT

## 2025-05-09 PROCEDURE — 85610 PROTHROMBIN TIME: CPT | Performed by: PHYSICIAN ASSISTANT

## 2025-05-09 PROCEDURE — 5A1D70Z PERFORMANCE OF URINARY FILTRATION, INTERMITTENT, LESS THAN 6 HOURS PER DAY: ICD-10-PCS | Performed by: INTERNAL MEDICINE

## 2025-05-09 PROCEDURE — 99285 EMERGENCY DEPT VISIT HI MDM: CPT | Mod: 25

## 2025-05-09 PROCEDURE — 25000003 PHARM REV CODE 250

## 2025-05-09 PROCEDURE — 86901 BLOOD TYPING SEROLOGIC RH(D): CPT | Performed by: PHYSICIAN ASSISTANT

## 2025-05-09 PROCEDURE — 36430 TRANSFUSION BLD/BLD COMPNT: CPT

## 2025-05-09 PROCEDURE — 93010 ELECTROCARDIOGRAM REPORT: CPT | Mod: ,,, | Performed by: INTERNAL MEDICINE

## 2025-05-09 RX ORDER — CHOLECALCIFEROL (VITAMIN D3) 25 MCG
1000 TABLET ORAL DAILY
Status: DISCONTINUED | OUTPATIENT
Start: 2025-05-10 | End: 2025-05-11 | Stop reason: HOSPADM

## 2025-05-09 RX ORDER — ATORVASTATIN CALCIUM 40 MG/1
40 TABLET, FILM COATED ORAL NIGHTLY
Status: DISCONTINUED | OUTPATIENT
Start: 2025-05-09 | End: 2025-05-11 | Stop reason: HOSPADM

## 2025-05-09 RX ORDER — LOPERAMIDE HYDROCHLORIDE 2 MG/1
2 CAPSULE ORAL 4 TIMES DAILY PRN
Status: DISCONTINUED | OUTPATIENT
Start: 2025-05-09 | End: 2025-05-11 | Stop reason: HOSPADM

## 2025-05-09 RX ORDER — MEGESTROL ACETATE 40 MG/1
40 TABLET ORAL DAILY
Status: DISCONTINUED | OUTPATIENT
Start: 2025-05-10 | End: 2025-05-11 | Stop reason: HOSPADM

## 2025-05-09 RX ORDER — VENLAFAXINE 37.5 MG/1
37.5 TABLET ORAL DAILY
Status: DISCONTINUED | OUTPATIENT
Start: 2025-05-10 | End: 2025-05-11 | Stop reason: HOSPADM

## 2025-05-09 RX ORDER — AMIODARONE HYDROCHLORIDE 200 MG/1
400 TABLET ORAL DAILY
Status: DISCONTINUED | OUTPATIENT
Start: 2025-05-10 | End: 2025-05-11 | Stop reason: HOSPADM

## 2025-05-09 RX ORDER — HYDROCODONE BITARTRATE AND ACETAMINOPHEN 500; 5 MG/1; MG/1
TABLET ORAL
Status: DISCONTINUED | OUTPATIENT
Start: 2025-05-09 | End: 2025-05-11 | Stop reason: HOSPADM

## 2025-05-09 RX ORDER — PREGABALIN 50 MG/1
100 CAPSULE ORAL EVERY OTHER DAY
Status: DISCONTINUED | OUTPATIENT
Start: 2025-05-10 | End: 2025-05-11 | Stop reason: HOSPADM

## 2025-05-09 RX ORDER — SEVELAMER CARBONATE 800 MG/1
800 TABLET, FILM COATED ORAL
Status: DISCONTINUED | OUTPATIENT
Start: 2025-05-10 | End: 2025-05-10

## 2025-05-09 RX ORDER — HYDRALAZINE HYDROCHLORIDE 10 MG/1
10 TABLET, FILM COATED ORAL ONCE
Status: COMPLETED | OUTPATIENT
Start: 2025-05-10 | End: 2025-05-09

## 2025-05-09 RX ORDER — AMITRIPTYLINE HYDROCHLORIDE 25 MG/1
25 TABLET, FILM COATED ORAL
Status: DISCONTINUED | OUTPATIENT
Start: 2025-05-09 | End: 2025-05-11 | Stop reason: HOSPADM

## 2025-05-09 RX ORDER — HYDRALAZINE HYDROCHLORIDE 10 MG/1
10 TABLET, FILM COATED ORAL EVERY 8 HOURS
Status: DISCONTINUED | OUTPATIENT
Start: 2025-05-10 | End: 2025-05-09

## 2025-05-09 RX ADMIN — ATORVASTATIN CALCIUM 40 MG: 40 TABLET, FILM COATED ORAL at 08:05

## 2025-05-09 RX ADMIN — TRAZODONE HYDROCHLORIDE 25 MG: 50 TABLET ORAL at 08:05

## 2025-05-09 RX ADMIN — LOPERAMIDE HYDROCHLORIDE 2 MG: 2 CAPSULE ORAL at 10:05

## 2025-05-09 RX ADMIN — HYDRALAZINE HYDROCHLORIDE 10 MG: 10 TABLET ORAL at 11:05

## 2025-05-09 NOTE — ED PROVIDER NOTES
Encounter Date: 5/9/2025       History     Chief Complaint   Patient presents with    Fatigue    LVAD     LVAD COORD WIL BASS AWARE PT IN ER AND HAS EMERGENCY BAG WITH CABLES     65-year-old male with a advanced ischemic cardiomyopathy with LVAD, ESRD on HD MWF CHF, DM 2 presents for fatigue and anemia noted on outpatient lab workup.  Reports feeling significant fatigue, worse with exertion over the past couple of days.  He has had about 2 episodes of watery diarrhea daily for the past week.  He denies bloody stool, abdominal pain, fever, nausea/vomiting, chest pain or shortness of breath.  No on Sunday, it his LVAD low-flow alarm went off twice, he called his LVAD coordinator and they told him it may have been due to his standing up too quickly.  It has not alarmed since.  He was told to dialysis today that his hemoglobin was 7.  He was fully dialyzed in a took 2 L off of him this morning.  He does not make urine.      Review of patient's allergies indicates:  No Known Allergies  Past Medical History:   Diagnosis Date    Aspiration pneumonia of both lungs 06/17/2024    CAD (coronary artery disease)     CHF (congestive heart failure)     Delirium 06/16/2024    Diabetes mellitus     HFrEF (heart failure with reduced ejection fraction)     Hypoglycemia 06/12/2024    ICD (implantable cardioverter-defibrillator) in place     LVAD (left ventricular assist device) present 12/01/2023    MI, old     PAF (paroxysmal atrial fibrillation) 10/11/2023    Stage 4 chronic kidney disease 02/15/2024    Type 2 diabetes mellitus without complication, without long-term current use of insulin 10/07/2023     Past Surgical History:   Procedure Laterality Date    ANGIOPLASTY-VENOUS ARTERY Right 12/1/2023    Procedure: ANGIOPLASTY-VENOUS ARTERY, RIGHT FEMORAL;  Surgeon: Yuri Washington MD;  Location: [facility] OR [address] [place] [address];  Service: Cardiovascular;  Laterality: Right;    AORTIC VALVULOPLASTY N/A 12/1/2023    Procedure: REPAIR, AORTIC VALVE;   Surgeon: Yuri Washington MD;  Location: [facility] OR [facility] FLR;  Service: Cardiovascular;  Laterality: N/A;    CARDIAC SURGERY      CLOSURE N/A 12/1/2023    Procedure: CLOSURE, TEMPORARY;  Surgeon: Yuri Washington MD;  Location: [facility] OR 2ND FLR;  Service: Cardiovascular;  Laterality: N/A;    DRAINAGE OF PLEURAL EFFUSION  12/4/2023    Procedure: DRAINAGE, PLEURAL EFFUSION;  Surgeon: Yuri Washington MD;  Location: [facility] OR [facility] FLR;  Service: Cardiovascular;;    INSERTION OF GRAFT TO PERICARDIUM  12/4/2023    Procedure: INSERTION, GRAFT, PERICARDIUM;  Surgeon: Yuri Washington MD;  Location: [facility] OR [facility] FLR;  Service: Cardiovascular;;    INSERTION OF INTRA-AORTIC BALLOON ASSIST DEVICE Right 11/21/2023    Procedure: INSERTION, INTRA-AORTIC BALLOON PUMP;  Surgeon: Finn Cohn MD;  Location: [facility] CATH LAB;  Service: Cardiology;  Laterality: Right;    LEFT VENTRICULAR ASSIST DEVICE Left 12/1/2023    Procedure: INSERTION-LEFT VENTRICULAR ASSIST DEVICE;  Surgeon: Yuri Washington MD;  [facility];  Service: Cardiovascular;  Laterality: Left;  REDO STERNOTOMY - REDO SAW NEEDED FOR CASE    LYSIS OF ADHESIONS  12/1/2023    Procedure: LYSIS, ADHESIONS;  Surgeon: Yuri Washington MD;  [facility];  Service: Cardiovascular;;    PLACEMENT OF SWAN ROLANDO CATHETER WITH IMAGING GUIDANCE  11/20/2023    Procedure: INSERTION, CATHETER, SWAN-ROLANDO, WITH IMAGING GUIDANCE;  Surgeon: Sajan Hurley MD;  Location: [facility] CATH LAB;  Service: Cardiology;;    REMOVAL OF TUNNELED CENTRAL VENOUS CATHETER (CVC) N/A 3/1/2024    Procedure: REMOVAL, CATHETER, CENTRAL VENOUS, TUNNELED;  Surgeon: Seble Aguilar MD;  Location: [facility] CATH LAB;  Service: Interventional Nephrology;  Laterality: N/A;    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 12/1/2023    Procedure: REPAIR, ANEURYSM, ARTERY, FEMORAL;  Surgeon: Yuri Washington MD;  Location: [facility] OR [facility] FLR;  Service: Cardiovascular;  Laterality: Right;  Right Femoral Artery  Repair    RIGHT HEART CATHETERIZATION Right 10/10/2023    Procedure: INSERTION, CATHETER, RIGHT HEART;  Surgeon: Bin Gandhi MD;  Location: [facility] CATH LAB;  Service: Cardiology;  Laterality: Right;    RIGHT HEART CATHETERIZATION Right 10/13/2023    Procedure: INSERTION, CATHETER, RIGHT HEART;  Surgeon: Walter Mcintyre MD;  Location: [facility] CATH LAB;  Service: Cardiology;  Laterality: Right;    RIGHT HEART CATHETERIZATION  11/13/2023    RIGHT HEART CATHETERIZATION Right 11/13/2023    Procedure: INSERTION, CATHETER, RIGHT HEART;  Surgeon: Juventino Bermudez Jr., MD;  Location: [facility] CATH LAB;  Service: Cardiology;  Laterality: Right;    RIGHT HEART CATHETERIZATION Right 11/20/2023    Procedure: INSERTION, CATHETER, RIGHT HEART;  Surgeon: Sajan Hurley MD;  Location: [facility] CATH LAB;  Service: Cardiology;  Laterality: Right;    RIGHT HEART CATHETERIZATION Right 1/22/2024    Procedure: INSERTION, CATHETER, RIGHT HEART;  Surgeon: Brayan Ocampo MD;  Location: [facility] CATH LAB;  Service: Cardiology;  Laterality: Right;    STERNAL WOUND CLOSURE N/A 12/4/2023    Procedure: CLOSURE, WOUND, STERNUM;  Surgeon: Yuri Washington MD;  [facility];  Service: Cardiovascular;  Laterality: N/A;    STERNOTOMY N/A 12/1/2023    Procedure: STERNOTOMY, REDO;  Surgeon: Yuri Washington MD;  [facility];  Service: Cardiovascular;  Laterality: N/A;    VALVULOPLASTY, MITRAL VALVE N/A 12/1/2023    Procedure: VALVULOPLASTY, MITRAL VALVE;  Surgeon: Yuri Washington MD;  [facility];  Service: Cardiovascular;  Laterality: N/A;     No family history on file.  Social History[1]  Review of Systems    Physical Exam     Initial Vitals   BP Pulse Resp Temp SpO2   05/09/25 1515 05/09/25 1424 05/09/25 1424 05/09/25 1424 05/09/25 1630   (!) 80/0 81 20 98.2 °F (36.8 °C) 96 %      MAP       --                Physical Exam    Nursing note and vitals reviewed.  Constitutional: He is not diaphoretic. He appears  ill. No distress.   HENT:   Head: Normocephalic and atraumatic.   Cardiovascular:            Mechanical hum of VAD   Abdominal: Abdomen is soft. He exhibits no distension and no mass. There is no abdominal tenderness. There is no rebound and no guarding.   Genitourinary: Rectum:      Guaiac result negative.   Guaiac negative stool. : Acceptable.   Genitourinary Comments: PA present as chaperone for rectal exam. Brown stool in rectal vault. No kamar blood or melena. Guaiac negative     Musculoskeletal:         General: Normal range of motion.     Neurological: He is alert and oriented to person, place, and time.   Skin: Skin is warm and dry. There is pallor.         ED Course   Procedures  Labs Reviewed   COMPREHENSIVE METABOLIC PANEL - Abnormal       Result Value    Sodium 139      Potassium 3.2 (*)     Chloride 103      CO2 27      Glucose 109      BUN 15      Creatinine 3.6 (*)     Calcium 8.0 (*)     Protein Total 6.8      Albumin 2.4 (*)     Bilirubin Total 0.4      ALP 84      AST 23      ALT 15      Anion Gap 9      eGFR 18 (*)    PROTIME-INR - Abnormal    PT 22.6 (*)     INR 2.2 (*)    CBC WITH DIFFERENTIAL - Abnormal    WBC 4.21      RBC 2.85 (*)     HGB 6.8 (*)     HCT 23.4 (*)     MCV 82      MCH 23.9 (*)     MCHC 29.1 (*)     RDW 19.0 (*)     Platelet Count 240      MPV 10.0      Nucleated RBC 0      Neut % 76.2 (*)     Lymph % 14.7 (*)     Mono % 5.5      Eos % 2.4      Basophil % 0.5      Imm Grans % 0.7 (*)     Neut # 3.21      Lymph # 0.62 (*)     Mono # 0.23 (*)     Eos # 0.10      Baso # 0.02      Imm Grans # 0.03      Narrative:     This is an appended report.  These results have been appended to a previously verified report.   INFLUENZA A & B BY MOLECULAR - Normal    INFLUENZA A MOLECULAR Negative      INFLUENZA B MOLECULAR  Negative     MAGNESIUM - Normal    Magnesium  1.7     SARS-COV-2 RNA AMPLIFICATION, QUAL - Normal    SARS COV-2 Molecular Negative     CLOSTRIDIOIDES  DIFFICILE   CULTURE, STOOL   CBC W/ AUTO DIFFERENTIAL    Narrative:     The following orders were created for panel order CBC auto differential.  Procedure                               Abnormality         Status                     ---------                               -----------         ------                     CBC with Differential[[id number]]       Abnormal            Final result                 Please view results for these tests on the individual orders.   CBC W/ AUTO DIFFERENTIAL    Narrative:     The following orders were created for panel order CBC auto differential.  Procedure                               Abnormality         Status                     ---------                               -----------         ------                     CBC with Differential[[id number]]                                                        Please view results for these tests on the individual orders.   CBC WITH DIFFERENTIAL   GASTROINTESTINAL PATHOGENS PANEL, PCR   STOOL EXAM-OVA,CYSTS,PARASITES   FERRITIN   IRON AND TIBC   TYPE & SCREEN    Specimen Outdate 05/12/2025 23:59      Group & Rh A POS      Indirect Jimenez NEG     PREPARE RBC SOFT    UNIT NUMBER [id number]      UNIT ABO/RH A POS      DISPENSE STATUS Selected      Unit Expiration [id number]      Product Code [id number]      Unit Blood Type Code 6200      CROSSMATCH INTERPRETATION Compatible      UNIT NUMBER [id number]      UNIT ABO/RH A POS      DISPENSE STATUS Issued      Unit Expiration [id number]      Product Code [id number]      Unit Blood Type Code 6200      CROSSMATCH INTERPRETATION Compatible       EKG Readings: (Independently Interpreted)   Initial Reading: No STEMI.   Wide QRS rhythm at 81, significant artifact from VAD     ECG Results              EKG 12-lead (Final result)        Collection Time Result Time QRS Duration OHS QTC Calculation    05/09/25 14:27:21 05/09/25 15:26:15 180 650                     Final result by Interface, Lab In  [facility] (05/09/25 15:26:22)                   Narrative:    Test Reason : R53.83,    Vent. Rate :  81 BPM     Atrial Rate :    BPM     P-R Int :    ms          QRS Dur : 180 ms      QT Int : 560 ms       P-R-T Axes :    -47 234 degrees    QTcB Int : 650 ms    Baseline Artifact consider LVAD  Sinus rhythm  Left axis deviation  Nonspecific intraventricular block    Abnormal ECG  When compared with ECG of 02-Apr-2025 14:59,    sinus rhythm has replaced electronic atrial pacing  Confirmed by Austin Garcia ([id number]) on 5/9/2025 3:26:12 PM    Referred By:            Confirmed By: Austin Garcia                                  Imaging Results              CT Head Without Contrast (Final result)  Result time 05/09/25 18:03:48      Final result by Norm Mccain MD (05/09/25 18:03:48)                   Impression:      1. Allowing for motion artifact, no convincing acute intracranial abnormalities noting sequela of chronic microvascular ischemic change, senescent change, and remote infarcts as described.  2. Bilateral mastoid effusions.      Electronically signed by: Norm Mccain MD  Date:    05/09/2025  Time:    18:03               Narrative:    EXAMINATION:  CT HEAD WITHOUT CONTRAST    CLINICAL HISTORY:  Headache, new or worsening (Age >= 50y);    TECHNIQUE:  Low dose axial images were obtained through the head.  Coronal and sagittal reformations were also performed. Contrast was not administered.    COMPARISON:  04/22/2025    FINDINGS:  There is motion artifact.    There is generalized cerebral volume loss.  There is hypoattenuation in a periventricular fashion, likely sequela of chronic microvascular ischemic change.There is a small focus of low attenuation within the posterior aspect of the left corona radiata as well as a few scattered punctate foci within the left basal ganglia, suggesting sequela of remote infarcts.  There is encephalomalacia along the left paramedian occipital lobe, stable.  There is no  evidence of acute major vascular territory infarct, hemorrhage, or mass.  There is no hydrocephalus.  There are no abnormal extra-axial fluid collections.  There is fluid opacification of the inferior most bilateral mastoid air cells, otherwise the visualized paranasal sinuses and mastoid air cells are clear, and there is no evidence of calvarial fracture.  The visualized soft tissues are remarkable for focus of induration overlying the right posterior occipital calvarium..                                       X-Ray Chest AP Portable (Final result)  Result time 05/09/25 15:53:35      Final result by Hugh Canas MD (05/09/25 15:53:35)                   Impression:      See above      Electronically signed by: Hugh Canas  Date:    05/09/2025  Time:    15:53               Narrative:    EXAMINATION:  XR CHEST AP PORTABLE    CLINICAL HISTORY:  fatigue;    TECHNIQUE:  Single frontal view of the chest was performed.    COMPARISON:  04/02/2025    FINDINGS:  Low lung volumes with scattered atelectasis.  Mild blunting of the left costophrenic angle likely corresponding to a small left pleural effusion without change.    Stable appearance of the cardiomediastinal contours with an LVAD device status post median sternotomy.  Central venous catheter stable without pneumothorax.                                       Medications   0.9%  NaCl infusion (for blood administration) (has no administration in time range)   amiodarone tablet 400 mg (has no administration in time range)   amitriptyline tablet 25 mg (has no administration in time range)   atorvastatin tablet 40 mg (has no administration in time range)   pregabalin capsule 100 mg (has no administration in time range)   sevelamer carbonate tablet 800 mg (has no administration in time range)   venlafaxine tablet 37.5 mg (has no administration in time range)   vitamin D 1000 units tablet 1,000 Units (has no administration in time range)   trazodone split tablet 25 mg (has  no administration in time range)   megestroL tablet 40 mg (has no administration in time range)     Medical Decision Making  65-year-old male with LVAD presenting for fatigue and anemia noted on outpatient lab workup.  He appears ill but is in no acute distress.    Differential diagnosis:  Blood loss anemia though notably no obvious source   Coagulopathy   Infection is certainly a possibility though no fevers   Electrolyte derangement   Low clinical suspicion for intracranial process but given dizziness and anticoagulation will do head CT    Will check labs, CT, CXR, discuss with cardiology.    Labs show hemoglobin 6.8, no obvious source of blood loss. Will transfuse PRBCs, admit to HTS for further management.    Amount and/or Complexity of Data Reviewed  Labs: ordered. Decision-making details documented in ED Course.  Radiology: ordered and independent interpretation performed. Decision-making details documented in ED Course.  ECG/medicine tests: ordered and independent interpretation performed.    Risk  Decision regarding hospitalization.               ED Course as of 05/09/25 1816   Fri May 09, 2025   1429 Triage EKG reviewed: No STEMI [LP]   1655 Hemoglobin(!): 6.8 [CC]   1655 INR(!): 2.2 [CC]   1655 Potassium(!): 3.2 [CC]   1659 Case discussed with cardiology fellow who will evaluate the patient [CC]   1749 No STEMI on EKG [NN]   1805 CT Head Without Contrast  Per my independent interpretation, no ICH [CC]      ED Course User Index  [CC] Venessa Musa PA-C  [LP] Beto Yu III, MD  [NN] Christa Mcconnell MD                           Clinical Impression:  Final diagnoses:  [R53.83] Fatigue  [D64.9] Anemia requiring transfusions (Primary)  [Z95.811] LVAD (left ventricular assist device) present  [K92.2] GI bleed          ED Disposition Condition    Admit                     [1]   Social History  Tobacco Use    Smoking status: Former     Current packs/day: 0.50     Types: Cigarettes   Substance Use  Topics    Alcohol use: Yes     Comment: rarely    Drug use: No        Venessa Musa PA-C  05/09/25 [id number]

## 2025-05-09 NOTE — ASSESSMENT & PLAN NOTE
Procedure: Device Interrogation Including analysis of device parameters  Current Settings: Ventricular Assist Device  Review of device function is stable/unstable stable        5/9/2025     3:49 PM 4/4/2025     4:58 PM 4/4/2025    12:24 PM 4/4/2025     8:08 AM 4/4/2025     4:17 AM 4/4/2025    12:35 AM 4/3/2025     8:09 PM   TXP LVAD INTERROGATIONS   Type HeartMate3 HeartMate3  HeartMate3  HeartMate3  HeartMate3  HeartMate3  HeartMate3    Flow 3.5 3.8 3.7 3.8 3.2 3.3 2.9   Speed 5050 5000 5000 5000 5000 5000 5000   PI 5.3 5.2 6.2 5.1 7.8 8 7.9   Power ([place]) 3.4 3.3 3.5 3.4 3.4 3.4 3.4   LSL  4600 4600 4600 4600 4600 4600   Pulsatility  No Pulse No Pulse Pulse Intermittent pulse Intermittent pulse Intermittent pulse       Data saved with a previous flowsheet row definition     -to low-flow alarms on 5/4, contact LVAD coordinators, encouraged to drink fluids and take Imodium  -ongoing diarrhea for 2 weeks  -INR in therapeutic range, warfarin held on admission due to concern for GI bleed

## 2025-05-09 NOTE — TELEPHONE ENCOUNTER
"Spouse called to report dialysis nurse notified her of hgb 7.0 today. Patient has been feeling weak and having diarrhea "like when he was bleeding before". Advised her to bring patient to [facility] ER. On call VAD Coordinator, Oscar Duncan, and on call provider, Dr Mcintyre, notified.  "

## 2025-05-09 NOTE — HPI
65 y/o AAM w/ PMHx of stage D HFrEF 2/2 ICM s/p HM3 as DT (12/1/2023), ESRD on HD (MWF), and debility who presented to the emergency department due to anemia.  Patient reports that he has been having about 2 weeks of fatigue and on and off diarrhea, reports that his diarrhea has not been black or bloody previously but reports potentially some darker stools more recently, however reports that it is mostly a watery consistency so he is not able to firmly characterize the diarrhea.  Patient is on to the emergency department by the LVAD coordinators due to concern for anemia with hemoglobin of 6.8.  Patient's baseline hemoglobin is 9-10.  Patient had 2 low-flow alarms on Sunday 05/04/2025, but reports no repeat alarms since then.  Patient was advised by the LVAD coordinators to take Imodium and drink a Gatorade or Powerade to help with hydration during low-flow alarm on Sunday.    Patient reports he had does not have any abdominal pain, but does have poor appetite, decreased p.o. intake, ongoing diarrhea.  No nausea or vomiting.  Denies any hematemesis or hemoptysis.  He denies any hematuria.    Patient just had dialysis day, he follows a Monday Wednesday Friday schedule.  They pulled 2 L of fluid during dialysis today.    He was recently admitted and discharged on 04/07/2025 for weakness and syncope.  Patient was treated with IV fluids at that time, had an echo completed an HD session in and then was discharged home.  In the emergency department, patients vitals were stable, hemoglobin was 6.8 with a baseline hemoglobin of 9 to 10, hypokalemic with potassium of 3.2, and creatinine elevated in setting of ESRD status post dialysis with 2 L of fluid removal today.  INR was 2.2.  Patient received 1 unit of blood in the ED. no other infectious symptoms besides diarrhea.  CT scan pending.

## 2025-05-09 NOTE — ASSESSMENT & PLAN NOTE
Patient euvolemic on exam, lower extremity pitting edema, however albumin very low on admission at 2.4.  Patient on HD, but does still make urine.  We will hold off on diuresis at this time.

## 2025-05-09 NOTE — ASSESSMENT & PLAN NOTE
History of anemia of chronic disease, baseline hemoglobin is 9 to 10.  Admitting hemoglobin is 6.8.  S/p 1 unit of blood in the ED on 05/09/2025.  Low-flow alarms x2 on 05/04/2025, short-lived.  Patient given rehydration with improvement and no further low-flow alarms.  Ongoing diarrhea.    -iron studies  -follow CBC b.i.d.  -consult to GI  -infectious stool studies pending  -clear liquid diet until midnight, NPO at midnight for possible intervention

## 2025-05-09 NOTE — ASSESSMENT & PLAN NOTE
Patient gets HD on Monday Wednesday Friday, s/p hemodialysis session with pulling 2 L today 05/09/2025 on day of admission.  -consult to Nephrology for management of dialysis, appreciate recommendations

## 2025-05-09 NOTE — H&P
[facility] - Emergency Dept  Heart Transplant  H&P    Patient Name: Radha Abbott  MRN: [id number]  Admission Date: 5/9/2025  Attending Physician: Christa Mcconnell MD  Primary Care Provider: Vasu Kong MD  Principal Problem:<principal problem not specified>    Subjective:     History of Present Illness:  65 y/o AAM w/ PMHx of stage D HFrEF 2/2 ICM s/p HM3 as DT (12/1/2023), ESRD on HD (MWF), and debility who presented to the emergency department due to anemia.  Patient reports that he has been having about 2 weeks of fatigue and on and off diarrhea, reports that his diarrhea has not been black or bloody previously but reports potentially some darker stools more recently, however reports that it is mostly a watery consistency so he is not able to firmly characterize the diarrhea.  Patient is on to the emergency department by the LVAD coordinators due to concern for anemia with hemoglobin of 6.8.  Patient's baseline hemoglobin is 9-10.  Patient had 2 low-flow alarms on Sunday 05/04/2025, but reports no repeat alarms since then.  Patient was advised by the LVAD coordinators to take Imodium and drink a Gatorade or Powerade to help with hydration during low-flow alarm on Sunday.    Patient reports he had does not have any abdominal pain, but does have poor appetite, decreased p.o. intake, ongoing diarrhea.  No nausea or vomiting.  Denies any hematemesis or hemoptysis.  He denies any hematuria.    Patient just had dialysis day, he follows a Monday Wednesday Friday schedule.  They pulled 2 L of fluid during dialysis today.    He was recently admitted and discharged on 04/07/2025 for weakness and syncope.  Patient was treated with IV fluids at that time, had an echo completed an HD session in and then was discharged home.  In the emergency department, patients vitals were stable, hemoglobin was 6.8 with a baseline hemoglobin of 9 to 10, hypokalemic with potassium of 3.2, and creatinine elevated in setting of ESRD  status post dialysis with 2 L of fluid removal today.  INR was 2.2.  Patient received 1 unit of blood in the ED. no other infectious symptoms besides diarrhea.  CT scan pending.    Past Medical History:   Diagnosis Date    Aspiration pneumonia of both lungs 06/17/2024    CAD (coronary artery disease)     CHF (congestive heart failure)     Delirium 06/16/2024    Diabetes mellitus     HFrEF (heart failure with reduced ejection fraction)     Hypoglycemia 06/12/2024    ICD (implantable cardioverter-defibrillator) in place     LVAD (left ventricular assist device) present 12/01/2023    MI, old     PAF (paroxysmal atrial fibrillation) 10/11/2023    Stage 4 chronic kidney disease 02/15/2024    Type 2 diabetes mellitus without complication, without long-term current use of insulin 10/07/2023       Past Surgical History:   Procedure Laterality Date    ANGIOPLASTY-VENOUS ARTERY Right 12/1/2023    Procedure: ANGIOPLASTY-VENOUS ARTERY, RIGHT FEMORAL;  Surgeon: Yuri Washington MD;  [facility];  Service: Cardiovascular;  Laterality: Right;    AORTIC VALVULOPLASTY N/A 12/1/2023    Procedure: REPAIR, AORTIC VALVE;  Surgeon: Yuri Washington MD;  [facility];  Service: Cardiovascular;  Laterality: N/A;    CARDIAC SURGERY      CLOSURE N/A 12/1/2023    Procedure: CLOSURE, TEMPORARY;  Surgeon: Yuri Washington MD;  Location: [facility] OR [facility];  Service: Cardiovascular;  Laterality: N/A;    DRAINAGE OF PLEURAL EFFUSION  12/4/2023    Procedure: DRAINAGE, PLEURAL EFFUSION;  Surgeon: Yuri Washington MD;  [facility];  Service: Cardiovascular;;    INSERTION OF GRAFT TO PERICARDIUM  12/4/2023    Procedure: INSERTION, GRAFT, PERICARDIUM;  Surgeon: Yuri Washington MD;  [facility];  Service: Cardiovascular;;    INSERTION OF INTRA-AORTIC BALLOON ASSIST DEVICE Right 11/21/2023    Procedure: INSERTION, INTRA-AORTIC BALLOON PUMP;  Surgeon: Finn Cohn MD;  Location: [facility] CATH LAB;   Service: Cardiology;  Laterality: Right;    LEFT VENTRICULAR ASSIST DEVICE Left 12/1/2023    Procedure: INSERTION-LEFT VENTRICULAR ASSIST DEVICE;  Surgeon: Yuri Washington MD;  [facility];  Service: Cardiovascular;  Laterality: Left;  REDO STERNOTOMY - REDO SAW NEEDED FOR CASE    LYSIS OF ADHESIONS  12/1/2023    Procedure: LYSIS, ADHESIONS;  Surgeon: Yuri Washington MD;  [facility];  Service: Cardiovascular;;    PLACEMENT OF SWAN ROLANDO CATHETER WITH IMAGING GUIDANCE  11/20/2023    Procedure: INSERTION, CATHETER, SWAN-ROLANDO, WITH IMAGING GUIDANCE;  Surgeon: Sajan Hurley MD;  Location: [facility] CATH LAB;  Service: Cardiology;;    REMOVAL OF TUNNELED CENTRAL VENOUS CATHETER (CVC) N/A 3/1/2024    Procedure: REMOVAL, CATHETER, CENTRAL VENOUS, TUNNELED;  Surgeon: Seble Aguilar MD;  Location: [facility] CATH LAB;  Service: Interventional Nephrology;  Laterality: N/A;    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 12/1/2023    Procedure: REPAIR, ANEURYSM, ARTERY, FEMORAL;  Surgeon: Yuri Washington MD;  [facility];  Service: Cardiovascular;  Laterality: Right;  Right Femoral Artery Repair    RIGHT HEART CATHETERIZATION Right 10/10/2023    Procedure: INSERTION, CATHETER, RIGHT HEART;  Surgeon: Bin Gandhi MD;  Location: [facility] CATH LAB;  Service: Cardiology;  Laterality: Right;    RIGHT HEART CATHETERIZATION Right 10/13/2023    Procedure: INSERTION, CATHETER, RIGHT HEART;  Surgeon: Walter Mcintyre MD;  Location: [facility] CATH LAB;  Service: Cardiology;  Laterality: Right;    RIGHT HEART CATHETERIZATION  11/13/2023    RIGHT HEART CATHETERIZATION Right 11/13/2023    Procedure: INSERTION, CATHETER, RIGHT HEART;  Surgeon: Juventino Bermudez Jr., MD;  Location: [facility] CATH LAB;  Service: Cardiology;  Laterality: Right;    RIGHT HEART CATHETERIZATION Right 11/20/2023    Procedure: INSERTION, CATHETER, RIGHT HEART;  Surgeon: Sajan Hurley MD;  Location: [facility] CATH LAB;  Service:  Cardiology;  Laterality: Right;    RIGHT HEART CATHETERIZATION Right 1/22/2024    Procedure: INSERTION, CATHETER, RIGHT HEART;  Surgeon: Brayan Ocampo MD;  Location: [facility] CATH LAB;  Service: Cardiology;  Laterality: Right;    STERNAL WOUND CLOSURE N/A 12/4/2023    Procedure: CLOSURE, WOUND, STERNUM;  Surgeon: Yuri Washington MD;  Location: [facility] OR [facility];  Service: Cardiovascular;  Laterality: N/A;    STERNOTOMY N/A 12/1/2023    Procedure: STERNOTOMY, REDO;  Surgeon: Yuri Washington MD;  Location: [facility] OR [facility];  Service: Cardiovascular;  Laterality: N/A;    VALVULOPLASTY, MITRAL VALVE N/A 12/1/2023    Procedure: VALVULOPLASTY, MITRAL VALVE;  Surgeon: Yuri Washington MD;  [facility];  Service: Cardiovascular;  Laterality: N/A;       Review of patient's allergies indicates:  No Known Allergies    Current Facility-Administered Medications   Medication    0.9%  NaCl infusion (for blood administration)    [START ON 5/10/2025] amiodarone tablet 400 mg    amitriptyline tablet 25 mg    atorvastatin tablet 40 mg    [START ON 5/10/2025] megestroL tablet 40 mg    [START ON 5/10/2025] pregabalin capsule 100 mg    [START ON 5/10/2025] sevelamer carbonate tablet 800 mg    trazodone split tablet 25 mg    [START ON 5/10/2025] venlafaxine tablet 37.5 mg    [START ON 5/10/2025] vitamin D 1000 units tablet 1,000 Units     Current Outpatient Medications   Medication Sig    amiodarone (PACERONE) 400 MG tablet Take 1 tablet (400 mg total) by mouth once daily.    amitriptyline (ELAVIL) 25 MG tablet Take 1 tablet (25 mg total) by mouth 3 (three) times a week.    atorvastatin (LIPITOR) 40 MG tablet Take 1 tablet (40 mg total) by mouth every evening.    megestroL (MEGACE) 40 MG Tab Take 40-80 mg by mouth once daily As needed for appetite.    omega 3-dha-epa-fish oil (FISH OIL) 1,000 (120-180) mg Cap Take 1 capsule by mouth once daily.    pregabalin (LYRICA) 100 MG capsule Take 1 capsule by mouth every other day.    pulse  oximeter (PULSE OXIMETER) device by Apply Externally route 2 (two) times a day. Use twice daily at 8 AM and 3 PM and record the value in MyChart as directed.    sevelamer carbonate (RENVELA) 800 mg Tab Take 800 mg by mouth 3 (three) times daily with meals.    traZODone (DESYREL) 50 MG tablet Take 0.5 tablets (25 mg total) by mouth every evening.    venlafaxine (EFFEXOR) 37.5 MG Tab Take 1 tablet (37.5 mg total) by mouth once daily.    vitamin D (VITAMIN D3) 1000 units Tab Take 1 tablet by mouth once daily    warfarin (COUMADIN) 2.5 MG tablet Take 1.25 mg orally daily     Family History    None       Tobacco Use    Smoking status: Former     Current packs/day: 0.50     Types: Cigarettes    Smokeless tobacco: Not on file   Substance and Sexual Activity    Alcohol use: Yes     Comment: rarely    Drug use: No    Sexual activity: Not Currently     Partners: Female     Review of Systems   Constitutional:  Positive for activity change, appetite change and fatigue. Negative for diaphoresis.   Respiratory:  Positive for shortness of breath. Negative for chest tightness.    Cardiovascular:  Positive for leg swelling. Negative for chest pain.   Neurological:  Positive for dizziness, weakness and light-headedness.   Psychiatric/Behavioral:  The patient is nervous/anxious.      Objective:     Vital Signs (Most Recent):  Temp: 99.5 °F (37.5 °C) (05/09/25 1515)  Pulse: 85 (05/09/25 1515)  Resp: 20 (05/09/25 1515)  BP: (!) 80/0 (05/09/25 1515)  SpO2: (S)  (LVAD pt not registering on monitor) (05/09/25 1515) Vital Signs (24h Range):  Temp:  [98.2 °F (36.8 °C)-99.5 °F (37.5 °C)] 99.5 °F (37.5 °C)  Pulse:  [81-85] 85  Resp:  [20] 20  BP: (80)/(0) 80/0     Patient Vitals for the past 72 hrs (Last 3 readings):   Weight   05/09/25 1515 77.6 kg (171 lb)   05/09/25 1424 77.6 kg (171 lb)     Body mass index is 22.56 kg/m².    No intake or output data in the 24 hours ending 05/09/25 6722       Physical Exam  Vitals reviewed.  "  Constitutional:       Appearance: He is normal weight. He is ill-appearing.   HENT:      Head: Normocephalic and atraumatic.      Right Ear: External ear normal.      Left Ear: External ear normal.      Nose: Nose normal.      Mouth/Throat:      Mouth: Mucous membranes are moist.      Pharynx: Oropharynx is clear.   Eyes:      Conjunctiva/sclera: Conjunctivae normal.   Cardiovascular:      Comments: Nl lvad hum    NO JVD on exam  Pulmonary:      Effort: Pulmonary effort is normal.      Breath sounds: Normal breath sounds.   Abdominal:      General: Abdomen is flat. Bowel sounds are normal.      Palpations: Abdomen is soft.      Tenderness: There is abdominal tenderness.   Musculoskeletal:      Cervical back: Normal range of motion.      Right lower leg: Edema present.      Left lower leg: Edema present.   Skin:     General: Skin is warm.   Neurological:      Mental Status: He is alert. Mental status is at baseline.   Psychiatric:         Mood and Affect: Mood normal.         Behavior: Behavior normal.            Significant Labs:  CBC:  Recent Labs   Lab 05/09/25  1618   WBC 4.21   RBC 2.85*   HGB 6.8*   HCT 23.4*      MCV 82   MCH 23.9*   MCHC 29.1*     BNP:  No results for input(s): "BNP" in the last 168 hours.    Invalid input(s): "BNPTRIAGELBLO"  CMP:  Recent Labs   Lab 05/09/25  1618      CALCIUM 8.0*   ALBUMIN 2.4*   PROT 6.8      K 3.2*   CO2 27      BUN 15   CREATININE 3.6*   ALKPHOS 84   ALT 15   AST 23   BILITOT 0.4      Coagulation:   Recent Labs   Lab 05/08/25  1024 05/09/25  1618   INR 2.0* 2.2*     LDH:  No results for input(s): "LDH" in the last 72 hours.  Microbiology:  Microbiology Results (last 7 days)       Procedure Component Value Units Date/Time    Clostridium difficile EIA [[id number]]     Order Status: Sent Specimen: Stool     Stool culture [[id number]]     Order Status: Sent Specimen: Stool     Influenza A & B by Molecular [[id number]]  (Normal) Collected: " 05/09/25 1548    Order Status: Completed Specimen: Nasal Swab Updated: 05/09/25 1717     INFLUENZA A MOLECULAR Negative     INFLUENZA B MOLECULAR  Negative            I have reviewed all pertinent labs within the past 24 hours.    Diagnostic Results:  I have reviewed and interpreted all pertinent imaging results/findings within the past 24 hours.    Assessment/Plan:     Anemia  History of anemia of chronic disease, baseline hemoglobin is 9 to 10.  Admitting hemoglobin is 6.8.  S/p 1 unit of blood in the ED on 05/09/2025.  Low-flow alarms x2 on 05/04/2025, short-lived.  Patient given rehydration with improvement and no further low-flow alarms.  Ongoing diarrhea.    -iron studies  -follow CBC b.i.d.  -consult to GI  -infectious stool studies pending  -clear liquid diet until midnight, NPO at midnight for possible intervention      LVAD (left ventricular assist device) present  Procedure: Device Interrogation Including analysis of device parameters  Current Settings: Ventricular Assist Device  Review of device function is stable/unstable stable        5/9/2025     3:49 PM 4/4/2025     4:58 PM 4/4/2025    12:24 PM 4/4/2025     8:08 AM 4/4/2025     4:17 AM 4/4/2025    12:35 AM 4/3/2025     8:09 PM   TXP LVAD INTERROGATIONS   Type HeartMate3 HeartMate3  HeartMate3  HeartMate3  HeartMate3  HeartMate3  HeartMate3    Flow 3.5 3.8 3.7 3.8 3.2 3.3 2.9   Speed 5050 5000 5000 5000 5000 5000 5000   PI 5.3 5.2 6.2 5.1 7.8 8 7.9   Power ([place]) 3.4 3.3 3.5 3.4 3.4 3.4 3.4   LSL  4600 4600 4600 4600 4600 4600   Pulsatility  No Pulse No Pulse Pulse Intermittent pulse Intermittent pulse Intermittent pulse       Data saved with a previous flowsheet row definition     -to low-flow alarms on 5/4, contact LVAD coordinators, encouraged to drink fluids and take Imodium  -ongoing diarrhea for 2 weeks  -INR in therapeutic range, warfarin held on admission due to concern for GI bleed      Type 2 diabetes mellitus without complication, without  long-term current use of insulin  -A1c  -corrective sliding scale    ESRD (end stage renal disease)  Patient gets HD on Monday Wednesday Friday, s/p hemodialysis session with pulling 2 L today 05/09/2025 on day of admission.  -consult to Nephrology for management of dialysis, appreciate recommendations    Heart failure  Patient euvolemic on exam, lower extremity pitting edema, however albumin very low on admission at 2.4.  Patient on HD, but does still make urine.  We will hold off on diuresis at this time.    PAF (paroxysmal atrial fibrillation)  Warfarin held on admission in the setting of possible GI bleed          Lilli Mckenzie MD  Heart Transplant  [facility] - Emergency Dept

## 2025-05-09 NOTE — SUBJECTIVE & OBJECTIVE
Health Maintenance Due   Topic Date Due   • DTaP/Tdap/Td Vaccine (1 - Tdap) 01/01/1984   • Influenza Vaccine (1) 09/01/2018       Patient is due for topics as listed above but is not proceeding with Immunization(s) Dtap/Tdap/Td and Influenza at this time.            Past Medical History:   Diagnosis Date    Aspiration pneumonia of both lungs 06/17/2024    CAD (coronary artery disease)     CHF (congestive heart failure)     Delirium 06/16/2024    Diabetes mellitus     HFrEF (heart failure with reduced ejection fraction)     Hypoglycemia 06/12/2024    ICD (implantable cardioverter-defibrillator) in place     LVAD (left ventricular assist device) present 12/01/2023    MI, old     PAF (paroxysmal atrial fibrillation) 10/11/2023    Stage 4 chronic kidney disease 02/15/2024    Type 2 diabetes mellitus without complication, without long-term current use of insulin 10/07/2023       Past Surgical History:   Procedure Laterality Date    ANGIOPLASTY-VENOUS ARTERY Right 12/1/2023    Procedure: ANGIOPLASTY-VENOUS ARTERY, RIGHT FEMORAL;  Surgeon: Yuri Washington MD;  [facility];  Service: Cardiovascular;  Laterality: Right;    AORTIC VALVULOPLASTY N/A 12/1/2023    Procedure: REPAIR, AORTIC VALVE;  Surgeon: Yuri Washington MD;  [facility];  Service: Cardiovascular;  Laterality: N/A;    CARDIAC SURGERY      CLOSURE N/A 12/1/2023    Procedure: CLOSURE, TEMPORARY;  Surgeon: Yuri Washington MD;  Location: [facility] OR [facility];  Service: Cardiovascular;  Laterality: N/A;    DRAINAGE OF PLEURAL EFFUSION  12/4/2023    Procedure: DRAINAGE, PLEURAL EFFUSION;  Surgeon: Yuri Washington MD;  Location: [facility];  Service: Cardiovascular;;    INSERTION OF GRAFT TO PERICARDIUM  12/4/2023    Procedure: INSERTION, GRAFT, PERICARDIUM;  Surgeon: Yuri Washington MD;  [facility];  Service: Cardiovascular;;    INSERTION OF INTRA-AORTIC BALLOON ASSIST DEVICE Right 11/21/2023    Procedure: INSERTION, INTRA-AORTIC BALLOON PUMP;  Surgeon: Finn Cohn MD;  Location: [facility] CATH LAB;  Service: Cardiology;  Laterality: Right;    LEFT VENTRICULAR ASSIST DEVICE Left 12/1/2023    Procedure: INSERTION-LEFT VENTRICULAR ASSIST DEVICE;  Surgeon: Yuri Washington MD;   Location: [address] FLR;  Service: Cardiovascular;  Laterality: Left;  REDO STERNOTOMY - REDO SAW NEEDED FOR CASE    LYSIS OF ADHESIONS  12/1/2023    Procedure: LYSIS, ADHESIONS;  Surgeon: Yuri Washington MD;  [address];  Service: Cardiovascular;;    PLACEMENT OF SWAN ROLANDO CATHETER WITH IMAGING GUIDANCE  11/20/2023    Procedure: INSERTION, CATHETER, SWAN-ROLANDO, WITH IMAGING GUIDANCE;  Surgeon: Sajan Hurley MD;  Location: [facility] CATH LAB;  Service: Cardiology;;    REMOVAL OF TUNNELED CENTRAL VENOUS CATHETER (CVC) N/A 3/1/2024    Procedure: REMOVAL, CATHETER, CENTRAL VENOUS, TUNNELED;  Surgeon: Seble Aguilar MD;  Location: [facility] CATH LAB;  Service: Interventional Nephrology;  Laterality: N/A;    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 12/1/2023    Procedure: REPAIR, ANEURYSM, ARTERY, FEMORAL;  Surgeon: Yuri Washington MD;  [address];  Service: Cardiovascular;  Laterality: Right;  Right Femoral Artery Repair    RIGHT HEART CATHETERIZATION Right 10/10/2023    Procedure: INSERTION, CATHETER, RIGHT HEART;  Surgeon: Bin Gandhi MD;  Location: [facility] CATH LAB;  Service: Cardiology;  Laterality: Right;    RIGHT HEART CATHETERIZATION Right 10/13/2023    Procedure: INSERTION, CATHETER, RIGHT HEART;  Surgeon: Walter Mcintyre MD;  Location: [facility] CATH LAB;  Service: Cardiology;  Laterality: Right;    RIGHT HEART CATHETERIZATION  11/13/2023    RIGHT HEART CATHETERIZATION Right 11/13/2023    Procedure: INSERTION, CATHETER, RIGHT HEART;  Surgeon: Juventino Bermudez Jr., MD;  Location: [facility] CATH LAB;  Service: Cardiology;  Laterality: Right;    RIGHT HEART CATHETERIZATION Right 11/20/2023    Procedure: INSERTION, CATHETER, RIGHT HEART;  Surgeon: Sajan Hurley MD;  Location: [facility] CATH LAB;  Service: Cardiology;  Laterality: Right;    RIGHT HEART CATHETERIZATION Right 1/22/2024    Procedure: INSERTION, CATHETER, RIGHT HEART;  Surgeon: Brayan Ocampo MD;  Location: [facility]  LAB;  Service: Cardiology;  Laterality: Right;    STERNAL WOUND CLOSURE N/A 12/4/2023    Procedure: CLOSURE, WOUND, STERNUM;  Surgeon: Yuri Washington MD;  Location: [facility];  Service: Cardiovascular;  Laterality: N/A;    STERNOTOMY N/A 12/1/2023    Procedure: STERNOTOMY, REDO;  Surgeon: Yuri Washington MD;  Location: [facility];  Service: Cardiovascular;  Laterality: N/A;    VALVULOPLASTY, MITRAL VALVE N/A 12/1/2023    Procedure: VALVULOPLASTY, MITRAL VALVE;  Surgeon: Yuri Washington MD;  Location: [facility];  Service: Cardiovascular;  Laterality: N/A;       Review of patient's allergies indicates:  No Known Allergies    Current Facility-Administered Medications   Medication    0.9%  NaCl infusion (for blood administration)    [START ON 5/10/2025] amiodarone tablet 400 mg    amitriptyline tablet 25 mg    atorvastatin tablet 40 mg    [START ON 5/10/2025] megestroL tablet 40 mg    [START ON 5/10/2025] pregabalin capsule 100 mg    [START ON 5/10/2025] sevelamer carbonate tablet 800 mg    trazodone split tablet 25 mg    [START ON 5/10/2025] venlafaxine tablet 37.5 mg    [START ON 5/10/2025] vitamin D 1000 units tablet 1,000 Units     Current Outpatient Medications   Medication Sig    amiodarone (PACERONE) 400 MG tablet Take 1 tablet (400 mg total) by mouth once daily.    amitriptyline (ELAVIL) 25 MG tablet Take 1 tablet (25 mg total) by mouth 3 (three) times a week.    atorvastatin (LIPITOR) 40 MG tablet Take 1 tablet (40 mg total) by mouth every evening.    megestroL (MEGACE) 40 MG Tab Take 40-80 mg by mouth once daily As needed for appetite.    omega 3-dha-epa-fish oil (FISH OIL) 1,000 (120-180) mg Cap Take 1 capsule by mouth once daily.    pregabalin (LYRICA) 100 MG capsule Take 1 capsule by mouth every other day.    pulse oximeter (PULSE OXIMETER) device by Apply Externally route 2 (two) times a day. Use twice daily at 8 AM and 3 PM and record the value in MyChart as directed.    sevelamer  carbonate (RENVELA) 800 mg Tab Take 800 mg by mouth 3 (three) times daily with meals.    traZODone (DESYREL) 50 MG tablet Take 0.5 tablets (25 mg total) by mouth every evening.    venlafaxine (EFFEXOR) 37.5 MG Tab Take 1 tablet (37.5 mg total) by mouth once daily.    vitamin D (VITAMIN D3) 1000 units Tab Take 1 tablet by mouth once daily    warfarin (COUMADIN) 2.5 MG tablet Take 1.25 mg orally daily     Family History    None       Tobacco Use    Smoking status: Former     Current packs/day: 0.50     Types: Cigarettes    Smokeless tobacco: Not on file   Substance and Sexual Activity    Alcohol use: Yes     Comment: rarely    Drug use: No    Sexual activity: Not Currently     Partners: Female     Review of Systems   Constitutional:  Positive for activity change, appetite change and fatigue. Negative for diaphoresis.   Respiratory:  Positive for shortness of breath. Negative for chest tightness.    Cardiovascular:  Positive for leg swelling. Negative for chest pain.   Neurological:  Positive for dizziness, weakness and light-headedness.   Psychiatric/Behavioral:  The patient is nervous/anxious.      Objective:     Vital Signs (Most Recent):  Temp: 99.5 °F (37.5 °C) (05/09/25 1515)  Pulse: 85 (05/09/25 1515)  Resp: 20 (05/09/25 1515)  BP: (!) 80/0 (05/09/25 1515)  SpO2: (S)  (LVAD pt not registering on monitor) (05/09/25 1515) Vital Signs (24h Range):  Temp:  [98.2 °F (36.8 °C)-99.5 °F (37.5 °C)] 99.5 °F (37.5 °C)  Pulse:  [81-85] 85  Resp:  [20] 20  BP: (80)/(0) 80/0     Patient Vitals for the past 72 hrs (Last 3 readings):   Weight   05/09/25 1515 77.6 kg (171 lb)   05/09/25 1424 77.6 kg (171 lb)     Body mass index is 22.56 kg/m².    No intake or output data in the 24 hours ending 05/09/25 1759       Physical Exam  Vitals reviewed.   Constitutional:       Appearance: He is normal weight. He is ill-appearing.   HENT:      Head: Normocephalic and atraumatic.      Right Ear: External ear normal.      Left Ear: External  "ear normal.      Nose: Nose normal.      Mouth/Throat:      Mouth: Mucous membranes are moist.      Pharynx: Oropharynx is clear.   Eyes:      Conjunctiva/sclera: Conjunctivae normal.   Cardiovascular:      Comments: Nl lvad hum    NO JVD on exam  Pulmonary:      Effort: Pulmonary effort is normal.      Breath sounds: Normal breath sounds.   Abdominal:      General: Abdomen is flat. Bowel sounds are normal.      Palpations: Abdomen is soft.      Tenderness: There is abdominal tenderness.   Musculoskeletal:      Cervical back: Normal range of motion.      Right lower leg: Edema present.      Left lower leg: Edema present.   Skin:     General: Skin is warm.   Neurological:      Mental Status: He is alert. Mental status is at baseline.   Psychiatric:         Mood and Affect: Mood normal.         Behavior: Behavior normal.            Significant Labs:  CBC:  Recent Labs   Lab 05/09/25  1618   WBC 4.21   RBC 2.85*   HGB 6.8*   HCT 23.4*      MCV 82   MCH 23.9*   MCHC 29.1*     BNP:  No results for input(s): "BNP" in the last 168 hours.    Invalid input(s): "BNPTRIAGELBLO"  CMP:  Recent Labs   Lab 05/09/25  1618      CALCIUM 8.0*   ALBUMIN 2.4*   PROT 6.8      K 3.2*   CO2 27      BUN 15   CREATININE 3.6*   ALKPHOS 84   ALT 15   AST 23   BILITOT 0.4      Coagulation:   Recent Labs   Lab 05/08/25  1024 05/09/25  1618   INR 2.0* 2.2*     LDH:  No results for input(s): "LDH" in the last 72 hours.  Microbiology:  Microbiology Results (last 7 days)       Procedure Component Value Units Date/Time    Clostridium difficile EIA [[id number]]     Order Status: Sent Specimen: Stool     Stool culture [[id number]]     Order Status: Sent Specimen: Stool     Influenza A & B by Molecular [[id number]]  (Normal) Collected: 05/09/25 1548    Order Status: Completed Specimen: Nasal Swab Updated: 05/09/25 1717     INFLUENZA A MOLECULAR Negative     INFLUENZA B MOLECULAR  Negative            I have reviewed all " pertinent labs within the past 24 hours.    Diagnostic Results:  I have reviewed and interpreted all pertinent imaging results/findings within the past 24 hours.

## 2025-05-10 LAB
ABSOLUTE EOSINOPHIL (OHS): 0.14 K/UL
ABSOLUTE EOSINOPHIL (OHS): 0.14 K/UL
ABSOLUTE EOSINOPHIL (OHS): 0.16 K/UL
ABSOLUTE MONOCYTE (OHS): 0.43 K/UL (ref 0.3–1)
ABSOLUTE MONOCYTE (OHS): 0.48 K/UL (ref 0.3–1)
ABSOLUTE MONOCYTE (OHS): 0.49 K/UL (ref 0.3–1)
ABSOLUTE NEUTROPHIL COUNT (OHS): 3.57 K/UL (ref 1.8–7.7)
ABSOLUTE NEUTROPHIL COUNT (OHS): 3.88 K/UL (ref 1.8–7.7)
ABSOLUTE NEUTROPHIL COUNT (OHS): 4.4 K/UL (ref 1.8–7.7)
ADV 40+41 DNA STL QL NAA+NON-PROBE: NOT DETECTED
ANION GAP (OHS): 9 MMOL/L (ref 8–16)
ASTRO TYP 1-8 RNA STL QL NAA+NON-PROBE: NOT DETECTED
BASOPHILS # BLD AUTO: 0.03 K/UL
BASOPHILS NFR BLD AUTO: 0.5 %
BASOPHILS NFR BLD AUTO: 0.6 %
BASOPHILS NFR BLD AUTO: 0.6 %
BUN SERPL-MCNC: 18 MG/DL (ref 8–23)
C CAYETANENSIS DNA STL QL NAA+NON-PROBE: NOT DETECTED
C COLI+JEJ+UPSA DNA STL QL NAA+NON-PROBE: NOT DETECTED
C DIFF GDH STL QL: POSITIVE
C DIFF TOX A+B STL QL IA: NEGATIVE
C DIFF TOX GENS STL QL NAA+PROBE: POSITIVE
CALCIUM SERPL-MCNC: 7.7 MG/DL (ref 8.7–10.5)
CHLORIDE SERPL-SCNC: 106 MMOL/L (ref 95–110)
CO2 SERPL-SCNC: 27 MMOL/L (ref 23–29)
CREAT SERPL-MCNC: 3.9 MG/DL (ref 0.5–1.4)
CRYPTOSP DNA STL QL NAA+NON-PROBE: NOT DETECTED
E HISTOLYT DNA STL QL NAA+NON-PROBE: NOT DETECTED
EAEC PAA PLAS AGGR+AATA ST NAA+NON-PRB: NOT DETECTED
EC STX1+STX2 GENES STL QL NAA+NON-PROBE: NOT DETECTED
EPEC EAE GENE STL QL NAA+NON-PROBE: NOT DETECTED
ERYTHROCYTE [DISTWIDTH] IN BLOOD BY AUTOMATED COUNT: 18.4 % (ref 11.5–14.5)
ERYTHROCYTE [DISTWIDTH] IN BLOOD BY AUTOMATED COUNT: 18.5 % (ref 11.5–14.5)
ERYTHROCYTE [DISTWIDTH] IN BLOOD BY AUTOMATED COUNT: 18.5 % (ref 11.5–14.5)
ETEC LTA+ST1A+ST1B TOX ST NAA+NON-PROBE: NOT DETECTED
G LAMBLIA DNA STL QL NAA+NON-PROBE: NOT DETECTED
GFR SERPLBLD CREATININE-BSD FMLA CKD-EPI: 17 ML/MIN/1.73/M2
GLUCOSE SERPL-MCNC: 91 MG/DL (ref 70–110)
HCT VFR BLD AUTO: 26.7 % (ref 40–54)
HCT VFR BLD AUTO: 27.5 % (ref 40–54)
HCT VFR BLD AUTO: 27.8 % (ref 40–54)
HGB BLD-MCNC: 7.9 GM/DL (ref 14–18)
HGB BLD-MCNC: 8.1 GM/DL (ref 14–18)
HGB BLD-MCNC: 8.2 GM/DL (ref 14–18)
IMM GRANULOCYTES # BLD AUTO: 0.03 K/UL (ref 0–0.04)
IMM GRANULOCYTES NFR BLD AUTO: 0.5 % (ref 0–0.5)
IMM GRANULOCYTES NFR BLD AUTO: 0.6 % (ref 0–0.5)
IMM GRANULOCYTES NFR BLD AUTO: 0.6 % (ref 0–0.5)
INR PPP: 2.4 (ref 0.8–1.2)
LDH SERPL-CCNC: 308 U/L (ref 110–260)
LYMPHOCYTES # BLD AUTO: 0.69 K/UL (ref 1–4.8)
LYMPHOCYTES # BLD AUTO: 0.81 K/UL (ref 1–4.8)
LYMPHOCYTES # BLD AUTO: 0.85 K/UL (ref 1–4.8)
MAGNESIUM SERPL-MCNC: 1.6 MG/DL (ref 1.6–2.6)
MCH RBC QN AUTO: 24.6 PG (ref 27–31)
MCH RBC QN AUTO: 24.7 PG (ref 27–31)
MCH RBC QN AUTO: 25 PG (ref 27–31)
MCHC RBC AUTO-ENTMCNC: 29.5 G/DL (ref 32–36)
MCHC RBC AUTO-ENTMCNC: 29.5 G/DL (ref 32–36)
MCHC RBC AUTO-ENTMCNC: 29.6 G/DL (ref 32–36)
MCV RBC AUTO: 83 FL (ref 82–98)
MCV RBC AUTO: 84 FL (ref 82–98)
MCV RBC AUTO: 85 FL (ref 82–98)
NOROVIRUS GI+II RNA STL QL NAA+NON-PROBE: NOT DETECTED
NUCLEATED RBC (/100WBC) (OHS): 0 /100 WBC
P SHIGELLOIDES DNA STL QL NAA+NON-PROBE: NOT DETECTED
PHOSPHATE SERPL-MCNC: 1.7 MG/DL (ref 2.7–4.5)
PLATELET # BLD AUTO: 172 K/UL (ref 150–450)
PLATELET # BLD AUTO: 200 K/UL (ref 150–450)
PLATELET # BLD AUTO: 205 K/UL (ref 150–450)
PMV BLD AUTO: 10.1 FL (ref 9.2–12.9)
PMV BLD AUTO: 9.4 FL (ref 9.2–12.9)
PMV BLD AUTO: 9.5 FL (ref 9.2–12.9)
POTASSIUM SERPL-SCNC: 3 MMOL/L (ref 3.5–5.1)
PROTHROMBIN TIME: 24.4 SECONDS (ref 9–12.5)
RBC # BLD AUTO: 3.2 M/UL (ref 4.6–6.2)
RBC # BLD AUTO: 3.24 M/UL (ref 4.6–6.2)
RBC # BLD AUTO: 3.33 M/UL (ref 4.6–6.2)
RELATIVE EOSINOPHIL (OHS): 2.4 %
RELATIVE EOSINOPHIL (OHS): 2.7 %
RELATIVE EOSINOPHIL (OHS): 3 %
RELATIVE LYMPHOCYTE (OHS): 11.9 % (ref 18–48)
RELATIVE LYMPHOCYTE (OHS): 15.2 % (ref 18–48)
RELATIVE LYMPHOCYTE (OHS): 16.7 % (ref 18–48)
RELATIVE MONOCYTE (OHS): 8.1 % (ref 4–15)
RELATIVE MONOCYTE (OHS): 8.5 % (ref 4–15)
RELATIVE MONOCYTE (OHS): 9.4 % (ref 4–15)
RELATIVE NEUTROPHIL (OHS): 70 % (ref 38–73)
RELATIVE NEUTROPHIL (OHS): 72.5 % (ref 38–73)
RELATIVE NEUTROPHIL (OHS): 76.2 % (ref 38–73)
RVA RNA STL QL NAA+NON-PROBE: NOT DETECTED
S ENT+BONG DNA STL QL NAA+NON-PROBE: NOT DETECTED
SAPO I+II+IV+V RNA STL QL NAA+NON-PROBE: NOT DETECTED
SHIGELLA SP+EIEC IPAH ST NAA+NON-PROBE: NOT DETECTED
SODIUM SERPL-SCNC: 142 MMOL/L (ref 136–145)
V CHOL+PARA+VUL DNA STL QL NAA+NON-PROBE: NOT DETECTED
V CHOLERAE DNA STL QL NAA+NON-PROBE: NOT DETECTED
WBC # BLD AUTO: 5.1 K/UL (ref 3.9–12.7)
WBC # BLD AUTO: 5.34 K/UL (ref 3.9–12.7)
WBC # BLD AUTO: 5.78 K/UL (ref 3.9–12.7)
Y ENTEROCOL DNA STL QL NAA+NON-PROBE: NOT DETECTED

## 2025-05-10 PROCEDURE — 25000003 PHARM REV CODE 250: Performed by: INTERNAL MEDICINE

## 2025-05-10 PROCEDURE — 83735 ASSAY OF MAGNESIUM: CPT

## 2025-05-10 PROCEDURE — 87493 C DIFF AMPLIFIED PROBE: CPT

## 2025-05-10 PROCEDURE — 94799 UNLISTED PULMONARY SVC/PX: CPT

## 2025-05-10 PROCEDURE — 20600001 HC STEP DOWN PRIVATE ROOM

## 2025-05-10 PROCEDURE — 97535 SELF CARE MNGMENT TRAINING: CPT

## 2025-05-10 PROCEDURE — 25000003 PHARM REV CODE 250: Performed by: STUDENT IN AN ORGANIZED HEALTH CARE EDUCATION/TRAINING PROGRAM

## 2025-05-10 PROCEDURE — 36415 COLL VENOUS BLD VENIPUNCTURE: CPT | Performed by: SPECIALIST

## 2025-05-10 PROCEDURE — 93750 INTERROGATION VAD IN PERSON: CPT | Mod: ,,, | Performed by: INTERNAL MEDICINE

## 2025-05-10 PROCEDURE — 99223 1ST HOSP IP/OBS HIGH 75: CPT | Mod: ,,, | Performed by: NURSE PRACTITIONER

## 2025-05-10 PROCEDURE — 27000207 HC ISOLATION

## 2025-05-10 PROCEDURE — 99233 SBSQ HOSP IP/OBS HIGH 50: CPT | Mod: 25,,, | Performed by: INTERNAL MEDICINE

## 2025-05-10 PROCEDURE — 84100 ASSAY OF PHOSPHORUS: CPT

## 2025-05-10 PROCEDURE — 97165 OT EVAL LOW COMPLEX 30 MIN: CPT

## 2025-05-10 PROCEDURE — 87045 FECES CULTURE AEROBIC BACT: CPT

## 2025-05-10 PROCEDURE — 80048 BASIC METABOLIC PNL TOTAL CA: CPT

## 2025-05-10 PROCEDURE — 87177 OVA AND PARASITES SMEARS: CPT

## 2025-05-10 PROCEDURE — 25000003 PHARM REV CODE 250

## 2025-05-10 PROCEDURE — 27000248 HC VAD-ADDITIONAL DAY

## 2025-05-10 PROCEDURE — 85610 PROTHROMBIN TIME: CPT

## 2025-05-10 PROCEDURE — 87449 NOS EACH ORGANISM AG IA: CPT

## 2025-05-10 PROCEDURE — 85025 COMPLETE CBC W/AUTO DIFF WBC: CPT

## 2025-05-10 PROCEDURE — 36415 COLL VENOUS BLD VENIPUNCTURE: CPT

## 2025-05-10 PROCEDURE — 83615 LACTATE (LD) (LDH) ENZYME: CPT

## 2025-05-10 PROCEDURE — 85025 COMPLETE CBC W/AUTO DIFF WBC: CPT | Performed by: SPECIALIST

## 2025-05-10 PROCEDURE — 87507 IADNA-DNA/RNA PROBE TQ 12-25: CPT

## 2025-05-10 PROCEDURE — 87427 SHIGA-LIKE TOXIN AG IA: CPT

## 2025-05-10 PROCEDURE — 87046 STOOL CULTR AEROBIC BACT EA: CPT

## 2025-05-10 RX ORDER — HYDRALAZINE HYDROCHLORIDE 25 MG/1
25 TABLET, FILM COATED ORAL EVERY 8 HOURS
Status: DISCONTINUED | OUTPATIENT
Start: 2025-05-10 | End: 2025-05-11

## 2025-05-10 RX ORDER — POTASSIUM CHLORIDE 20 MEQ/1
40 TABLET, EXTENDED RELEASE ORAL ONCE
Status: COMPLETED | OUTPATIENT
Start: 2025-05-10 | End: 2025-05-10

## 2025-05-10 RX ORDER — MUPIROCIN 20 MG/G
OINTMENT TOPICAL 2 TIMES DAILY
Status: DISCONTINUED | OUTPATIENT
Start: 2025-05-10 | End: 2025-05-11 | Stop reason: HOSPADM

## 2025-05-10 RX ORDER — WARFARIN 2.5 MG/1
2.5 TABLET ORAL
Status: DISCONTINUED | OUTPATIENT
Start: 2025-05-15 | End: 2025-05-11 | Stop reason: HOSPADM

## 2025-05-10 RX ORDER — HYDRALAZINE HYDROCHLORIDE 10 MG/1
10 TABLET, FILM COATED ORAL ONCE
Status: COMPLETED | OUTPATIENT
Start: 2025-05-10 | End: 2025-05-10

## 2025-05-10 RX ADMIN — PREGABALIN 100 MG: 50 CAPSULE ORAL at 08:05

## 2025-05-10 RX ADMIN — Medication 1000 UNITS: at 08:05

## 2025-05-10 RX ADMIN — TRAZODONE HYDROCHLORIDE 25 MG: 50 TABLET ORAL at 09:05

## 2025-05-10 RX ADMIN — SEVELAMER CARBONATE 800 MG: 800 TABLET, FILM COATED ORAL at 08:05

## 2025-05-10 RX ADMIN — VANCOMYCIN HYDROCHLORIDE 125 MG: KIT at 05:05

## 2025-05-10 RX ADMIN — VENLAFAXINE 37.5 MG: 37.5 TABLET ORAL at 08:05

## 2025-05-10 RX ADMIN — HYDRALAZINE HYDROCHLORIDE 10 MG: 10 TABLET ORAL at 04:05

## 2025-05-10 RX ADMIN — ATORVASTATIN CALCIUM 40 MG: 40 TABLET, FILM COATED ORAL at 09:05

## 2025-05-10 RX ADMIN — MUPIROCIN: 20 OINTMENT TOPICAL at 09:05

## 2025-05-10 RX ADMIN — VANCOMYCIN HYDROCHLORIDE 125 MG: KIT at 11:05

## 2025-05-10 RX ADMIN — MUPIROCIN: 20 OINTMENT TOPICAL at 10:05

## 2025-05-10 RX ADMIN — HYDRALAZINE HYDROCHLORIDE 25 MG: 25 TABLET ORAL at 09:05

## 2025-05-10 RX ADMIN — AMIODARONE HYDROCHLORIDE 400 MG: 200 TABLET ORAL at 08:05

## 2025-05-10 RX ADMIN — MEGESTROL ACETATE 40 MG: 40 TABLET ORAL at 10:05

## 2025-05-10 RX ADMIN — POTASSIUM CHLORIDE 40 MEQ: 1500 TABLET, EXTENDED RELEASE ORAL at 08:05

## 2025-05-10 RX ADMIN — WARFARIN SODIUM 1.25 MG: 2.5 TABLET ORAL at 05:05

## 2025-05-10 RX ADMIN — SEVELAMER CARBONATE 800 MG: 800 TABLET, FILM COATED ORAL at 12:05

## 2025-05-10 RX ADMIN — HYDRALAZINE HYDROCHLORIDE 25 MG: 25 TABLET ORAL at 03:05

## 2025-05-10 NOTE — CONSULTS
[facility]  Gastroenterology  Consult Note    Patient Name: Radha Abbott  MRN: [id number]  Admission Date: 5/9/2025  Hospital Length of Stay: 1 days  Code Status: Full Code   Attending Provider: Walter Mcintyre MD   Consulting Provider: Mg Person DO  Primary Care Physician: Vasu Kong MD  Principal Problem:<principal problem not specified>    Inpatient consult to Gastroenterology  Consult performed by: Mg Person DO  Consult ordered by: Lilli Mckenzie MD        Subjective:     HPI: Radha Abbott is a 65 y.o. male with stage D CHFrEF 2/2 ICM s/p HM3 as DT (12/1/2023), ESRD on HD (MWF) and debility who presented to [facility] for further evaluation of acute on chronic anemia. Hemoglobin on admission 6.8. S/p 1 unit of PRBC's with repeat Hgb 8.2/8.1. Admitted to cardiac service. History on admission notable for diarrhea of 2 weeks in duration. No sick contacts, fever or abdominal pain. No recent antibiotics. Prior history of c. Diff in 8/2024. GI consulted for assistance with management. Of note, patient has no prior history of similar episodes. Does not remember a food or triggering event. No new medications. He denies any CGE, hematemesis, melena or BRBPR. Last colonoscopy was prior to VAD placement. No family history of colon cancer.     Past Medical History:   Diagnosis Date    Aspiration pneumonia of both lungs 06/17/2024    CAD (coronary artery disease)     CHF (congestive heart failure)     Delirium 06/16/2024    Diabetes mellitus     HFrEF (heart failure with reduced ejection fraction)     Hypoglycemia 06/12/2024    ICD (implantable cardioverter-defibrillator) in place     LVAD (left ventricular assist device) present 12/01/2023    MI, old     PAF (paroxysmal atrial fibrillation) 10/11/2023    Stage 4 chronic kidney disease 02/15/2024    Type 2 diabetes mellitus without complication, without long-term current use of insulin 10/07/2023       Past Surgical History:    Procedure Laterality Date    ANGIOPLASTY-VENOUS ARTERY Right 12/1/2023    Procedure: ANGIOPLASTY-VENOUS ARTERY, RIGHT FEMORAL;  Surgeon: Yuri Washington MD;  [facility];  Service: Cardiovascular;  Laterality: Right;    AORTIC VALVULOPLASTY N/A 12/1/2023    Procedure: REPAIR, AORTIC VALVE;  Surgeon: Yuri Washington MD;  [facility];  Service: Cardiovascular;  Laterality: N/A;    CARDIAC SURGERY      CLOSURE N/A 12/1/2023    Procedure: CLOSURE, TEMPORARY;  Surgeon: Yuri Washington MD;  Location: [facility] OR [facility];  Service: Cardiovascular;  Laterality: N/A;    DRAINAGE OF PLEURAL EFFUSION  12/4/2023    Procedure: DRAINAGE, PLEURAL EFFUSION;  Surgeon: Yuri Washington MD;  [facility];  Service: Cardiovascular;;    INSERTION OF GRAFT TO PERICARDIUM  12/4/2023    Procedure: INSERTION, GRAFT, PERICARDIUM;  Surgeon: Yuri Washington MD;  [facility];  Service: Cardiovascular;;    INSERTION OF INTRA-AORTIC BALLOON ASSIST DEVICE Right 11/21/2023    Procedure: INSERTION, INTRA-AORTIC BALLOON PUMP;  Surgeon: Finn Cohn MD;  Location: [facility] CATH LAB;  Service: Cardiology;  Laterality: Right;    LEFT VENTRICULAR ASSIST DEVICE Left 12/1/2023    Procedure: INSERTION-LEFT VENTRICULAR ASSIST DEVICE;  Surgeon: Yuri Washington MD;  [facility];  Service: Cardiovascular;  Laterality: Left;  REDO STERNOTOMY - REDO SAW NEEDED FOR CASE    LYSIS OF ADHESIONS  12/1/2023    Procedure: LYSIS, ADHESIONS;  Surgeon: Yuri Washington MD;  [facility];  Service: Cardiovascular;;    PLACEMENT OF SWAN ROLANDO CATHETER WITH IMAGING GUIDANCE  11/20/2023    Procedure: INSERTION, CATHETER, SWAN-ROLANDO, WITH IMAGING GUIDANCE;  Surgeon: Sajan Hurley MD;  Location: [facility] CATH LAB;  Service: Cardiology;;    REMOVAL OF TUNNELED CENTRAL VENOUS CATHETER (CVC) N/A 3/1/2024    Procedure: REMOVAL, CATHETER, CENTRAL VENOUS, TUNNELED;  Surgeon: Seble Aguilar MD;   Location: [facility] CATH LAB;  Service: Interventional Nephrology;  Laterality: N/A;    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 12/1/2023    Procedure: REPAIR, ANEURYSM, ARTERY, FEMORAL;  Surgeon: Yuri Washington MD;  Location: [facility] OR [address] [place] [address];  Service: Cardiovascular;  Laterality: Right;  Right Femoral Artery Repair    RIGHT HEART CATHETERIZATION Right 10/10/2023    Procedure: INSERTION, CATHETER, RIGHT HEART;  Surgeon: Bin Gandhi MD;  Location: [facility] CATH LAB;  Service: Cardiology;  Laterality: Right;    RIGHT HEART CATHETERIZATION Right 10/13/2023    Procedure: INSERTION, CATHETER, RIGHT HEART;  Surgeon: Walter Mcintyre MD;  Location: [facility] CATH LAB;  Service: Cardiology;  Laterality: Right;    RIGHT HEART CATHETERIZATION  11/13/2023    RIGHT HEART CATHETERIZATION Right 11/13/2023    Procedure: INSERTION, CATHETER, RIGHT HEART;  Surgeon: Juventino Bermudez Jr., MD;  Location: [facility] CATH LAB;  Service: Cardiology;  Laterality: Right;    RIGHT HEART CATHETERIZATION Right 11/20/2023    Procedure: INSERTION, CATHETER, RIGHT HEART;  Surgeon: Sajan Hurley MD;  Location: [facility] CATH LAB;  Service: Cardiology;  Laterality: Right;    RIGHT HEART CATHETERIZATION Right 1/22/2024    Procedure: INSERTION, CATHETER, RIGHT HEART;  Surgeon: Brayan Ocampo MD;  Location: [facility] CATH LAB;  Service: Cardiology;  Laterality: Right;    STERNAL WOUND CLOSURE N/A 12/4/2023    Procedure: CLOSURE, WOUND, STERNUM;  Surgeon: Yuri Washington MD;  Location: [address];  Service: Cardiovascular;  Laterality: N/A;    STERNOTOMY N/A 12/1/2023    Procedure: STERNOTOMY, REDO;  Surgeon: Yuri Washington MD;  [address];  Service: Cardiovascular;  Laterality: N/A;    VALVULOPLASTY, MITRAL VALVE N/A 12/1/2023    Procedure: VALVULOPLASTY, MITRAL VALVE;  Surgeon: Yuri Washington MD;  [facility];  Service: Cardiovascular;  Laterality: N/A;       No family history on file.    Social History[1]    Medications  Ordered Prior to Encounter[2]    Review of patient's allergies indicates:  No Known Allergies    Review of Systems   Constitutional:  Positive for malaise/fatigue. Negative for chills and fever.   Gastrointestinal:  Negative for abdominal pain, nausea and vomiting.      Objective:     Vitals:    05/10/25 1215   BP: (!) 108/0   Pulse:    Resp: 16   Temp: 97.9 °F (36.6 °C)     Constitutional:  not in acute distress  HENT: Head: Normal, normocephalic, atraumatic.  Eyes: conjunctiva clear  Cardiovascular: regular rate and rhythm  Respiratory: normal chest expansion & respiratory effort   and no accessory muscle use  GI: soft and nontender, LVAD drive-line to RLQ   Musculoskeletal: no muscular tenderness noted  Skin: normal color  Neurological: alert, oriented x3  Psychiatric: mood and affect are within normal limits    Significant Labs:  Recent Labs   Lab 05/09/25  1618 05/10/25  0052 05/10/25  0745   HGB 6.8* 8.2* 8.1*       Lab Results   Component Value Date    WBC 5.34 05/10/2025    HGB 8.1 (L) 05/10/2025    HCT 27.5 (L) 05/10/2025    MCV 85 05/10/2025     05/10/2025       Lab Results   Component Value Date     05/10/2025    K 3.0 (L) 05/10/2025     05/10/2025    CO2 27 05/10/2025    BUN 18 05/10/2025    CREATININE 3.9 (H) 05/10/2025    CALCIUM 7.7 (L) 05/10/2025    ANIONGAP 9 05/10/2025    ESTGFRAFRICA >60.0 02/07/2018    EGFRNONAA >60.0 02/07/2018       Lab Results   Component Value Date    ALT 15 05/09/2025    AST 23 05/09/2025    ALKPHOS 84 05/09/2025    BILITOT 0.4 05/09/2025       Lab Results   Component Value Date    INR 2.4 (H) 05/10/2025    INR 2.2 (H) 05/09/2025    INR 2.0 (H) 05/08/2025       Significant Imaging:  Reviewed pertinent radiology findings.       Assessment/Plan:     Radha Abbott is a 65 y.o. male with stage D CHFrEF 2/2 ICM s/p HM3 as DT (12/1/2023), ESRD on HD (MWF) and debility who presented to [facility] for further evaluation of acute on chronic anemia. Hemoglobin on admission  6.8. S/p 1 unit of PRBC's with repeat Hgb 8.2/8.1. Admitted to cardiac service. History on admission notable for diarrhea of 2 weeks in duration. No sick contacts, fever or abdominal pain. No recent antibiotics. Prior history of c. Diff in 8/2024. GI consulted for assistance with management.     Problem List:  Acute Diarrheal Illness   Acute on Chronic Anemia     Recommendations:  - Follow up stool studies   - If C. Diff PCR +, treat with vancomycin   - No overt signs of GI bleeding. Monitor. Do not suspect the patient has a GI bleed     Thank you for involving us in the care of Radha Abbott. Please call with any additional questions, concerns or changes in the patient's clinical status.     Mg Person DO  Gastroenterology Fellow PGY- V  [facility]       [1]   Social History  Socioeconomic History    Marital status:    Tobacco Use    Smoking status: Former     Current packs/day: 0.50     Types: Cigarettes   Substance and Sexual Activity    Alcohol use: Yes     Comment: rarely    Drug use: No    Sexual activity: Not Currently     Partners: Female     Social Drivers of Health     Financial Resource Strain: Low Risk  (4/4/2025)    Overall Financial Resource Strain (CARDIA)     Difficulty of Paying Living Expenses: Not hard at all   Food Insecurity: No Food Insecurity (4/4/2025)    Hunger Vital Sign     Worried About Running Out of Food in the Last Year: Never true     Ran Out of Food in the Last Year: Never true   Transportation Needs: Patient Declined (7/25/2024)    TRANSPORTATION NEEDS     Transportation : Patient declined   Physical Activity: Unknown (2/29/2024)    Exercise Vital Sign     Days of Exercise per Week: 2 days   Recent Concern: Physical Activity - Inactive (2/29/2024)    Exercise Vital Sign     Days of Exercise per Week: 2 days     Minutes of Exercise per Session: 0 min   Stress: No Stress Concern Present (4/4/2025)    [employer] [place] [employer] -  Occupational Stress Questionnaire     Feeling of Stress : Not at all   Housing Stability: Low Risk  (4/4/2025)    Housing Stability Vital Sign     Unable to Pay for Housing in the Last Year: No     Homeless in the Last Year: No   [2]   No current facility-administered medications on file prior to encounter.     Current Outpatient Medications on File Prior to Encounter   Medication Sig Dispense Refill    amiodarone (PACERONE) 400 MG tablet Take 1 tablet (400 mg total) by mouth once daily. 90 tablet 3    amitriptyline (ELAVIL) 25 MG tablet Take 1 tablet (25 mg total) by mouth 3 (three) times a week. 12 tablet 11    atorvastatin (LIPITOR) 40 MG tablet Take 1 tablet (40 mg total) by mouth every evening. 90 tablet 3    megestroL (MEGACE) 40 MG Tab Take 40-80 mg by mouth once daily As needed for appetite.      omega 3-dha-epa-fish oil (FISH OIL) 1,000 (120-180) mg Cap Take 1 capsule by mouth once daily.      pregabalin (LYRICA) 100 MG capsule Take 1 capsule by mouth every other day.      pulse oximeter (PULSE OXIMETER) device by Apply Externally route 2 (two) times a day. Use twice daily at 8 AM and 3 PM and [facility]. 1 each 0    sevelamer carbonate (RENVELA) 800 mg Tab Take 800 mg by mouth 3 (three) times daily with meals.      traZODone (DESYREL) 50 MG tablet Take 0.5 tablets (25 mg total) by mouth every evening. 45 tablet 3    venlafaxine (EFFEXOR) 37.5 MG Tab Take 1 tablet (37.5 mg total) by mouth once daily. 90 tablet 3    vitamin D (VITAMIN D3) 1000 units Tab Take 1 tablet by mouth once daily 30 tablet 0    warfarin (COUMADIN) 2.5 MG tablet Take 1.25 mg orally daily 45 tablet 11

## 2025-05-10 NOTE — PLAN OF CARE
LVAD dressing change completed by wife using sterile technique with silver kit. Tolerated without any complication. Sutures remain intact, no redness, or tenderness noted.       Problem: Ventricular Assist Device  Goal: Absence of Bleeding  Outcome: Progressing  Goal: Absence of Embolism Signs and Symptoms  Outcome: Progressing     Problem: Adult Inpatient Plan of Care  Goal: Optimal Comfort and Wellbeing  Outcome: Progressing     Problem: Fall Injury Risk  Goal: Absence of Fall and Fall-Related Injury  Outcome: Progressing     Problem: Skin Injury Risk Increased  Goal: Skin Health and Integrity  Outcome: Progressing     Problem: Hemodialysis  Goal: Safe, Effective Therapy Delivery  Outcome: Progressing

## 2025-05-10 NOTE — PROGRESS NOTES
[facility] - Cardiac Intensive Care  Heart Transplant  Progress Note    Patient Name: Radha Abbott  MRN: [id number]  Admission Date: 5/9/2025  Hospital Length of Stay: 1 days  Attending Physician: Walter Mcintyre MD  Primary Care Provider: Vasu Kong MD  Principal Problem:<principal problem not specified>    Subjective:   Interval History: pt doing well this AM, cont to have diarrhea. Seen by GI, no concern for Gi bleed. Cdiff testing positive, po vanc ordered.     Continuous Infusions:  Scheduled Meds:   amiodarone  400 mg Oral Daily    amitriptyline  25 mg Oral Once per day on Monday Wednesday Friday    atorvastatin  40 mg Oral QHS    hydrALAZINE  25 mg Oral Q8H    megestroL  40 mg Oral Daily    mupirocin   Nasal BID    pregabalin  100 mg Oral Every other day    traZODone  25 mg Oral QHS    venlafaxine  37.5 mg Oral Daily    vitamin D  1,000 Units Oral Daily     PRN Meds:  Current Facility-Administered Medications:     0.9%  NaCl infusion (for blood administration), , Intravenous, Q24H PRN    loperamide, 2 mg, Oral, QID PRN    Review of patient's allergies indicates:  No Known Allergies  Objective:     Vital Signs (Most Recent):  Temp: 97.9 °F (36.6 °C) (05/10/25 1215)  Pulse: 79 (05/10/25 1055)  Resp: 16 (05/10/25 1215)  BP: (!) 108/0 (05/10/25 1215)  SpO2: 96 % (05/10/25 1215) Vital Signs (24h Range):  Temp:  [97.9 °F (36.6 °C)-99.5 °F (37.5 °C)] 97.9 °F (36.6 °C)  Pulse:  [67-80] 79  Resp:  [16-20] 16  SpO2:  [95 %-98 %] 96 %  BP: ()/(0-91) 108/0     Patient Vitals for the past 72 hrs (Last 3 readings):   Weight   05/10/25 1456 77.6 kg (171 lb 1.2 oz)   05/09/25 2022 77.6 kg (170 lb 15.8 oz)   05/09/25 1515 77.6 kg (171 lb)     Body mass index is 22.57 kg/m².      Intake/Output Summary (Last 24 hours) at 5/10/2025 1520  Last data filed at 5/9/2025 2200  Gross per 24 hour   Intake 491.25 ml   Output --   Net 491.25 ml       Hemodynamic Parameters:       Telemetry: reviewed       Physical Exam  Vitals  "and nursing note reviewed.   Constitutional:       Appearance: He is normal weight.   HENT:      Head: Normocephalic and atraumatic.      Right Ear: External ear normal.      Left Ear: External ear normal.      Nose: Nose normal.      Mouth/Throat:      Mouth: Mucous membranes are moist.      Pharynx: Oropharynx is clear.   Eyes:      Conjunctiva/sclera: Conjunctivae normal.   Cardiovascular:      Comments: Nl vad hum  Pulmonary:      Effort: Pulmonary effort is normal.      Breath sounds: Normal breath sounds.   Abdominal:      General: Abdomen is flat. Bowel sounds are normal. There is no distension.      Palpations: Abdomen is soft.   Musculoskeletal:      Cervical back: Normal range of motion.      Right lower leg: No edema.      Left lower leg: No edema.   Skin:     General: Skin is warm.   Neurological:      Mental Status: He is alert. Mental status is at baseline.   Psychiatric:         Mood and Affect: Mood normal.         Behavior: Behavior normal.            Significant Labs:  CBC:  Recent Labs   Lab 05/09/25  1618 05/10/25  0052 05/10/25  0745   WBC 4.21 5.10 5.34   RBC 2.85* 3.33* 3.24*   HGB 6.8* 8.2* 8.1*   HCT 23.4* 27.8* 27.5*    200 205   MCV 82 84 85   MCH 23.9* 24.6* 25.0*   MCHC 29.1* 29.5* 29.5*     BNP:  No results for input(s): "BNP" in the last 168 hours.    Invalid input(s): "BNPTRIAGELBLO"  CMP:  Recent Labs   Lab 05/09/25  1618 05/10/25  0302    91   CALCIUM 8.0* 7.7*   ALBUMIN 2.4*  --    PROT 6.8  --     142   K 3.2* 3.0*   CO2 27 27    106   BUN 15 18   CREATININE 3.6* 3.9*   ALKPHOS 84  --    ALT 15  --    AST 23  --    BILITOT 0.4  --       Coagulation:   Recent Labs   Lab 05/08/25  1024 05/09/25  1618 05/10/25  0302   INR 2.0* 2.2* 2.4*     LDH:  Recent Labs   Lab 05/10/25  0302   *     Microbiology:  Microbiology Results (last 7 days)       Procedure Component Value Units Date/Time    C Diff Toxin by PCR [[id number]]  (Abnormal) Collected: 05/10/25 " 0431    Order Status: Completed Specimen: Stool Updated: 05/10/25 1458     Clostridioides difficile toxin PCR Positive    Clostridium difficile EIA [[id number]]  (Abnormal) Collected: 05/10/25 0431    Order Status: Completed Specimen: Stool Updated: 05/10/25 1413     C. DIFFICILE GDH AG Positive     Clostridioides difficile Toxin A/B Negative    E. coli 0157 antigen [[id number]] Collected: 05/10/25 0431    Order Status: Sent Specimen: Stool Updated: 05/10/25 0454    Stool culture [[id number]] Collected: 05/10/25 0431    Order Status: Sent Specimen: Stool Updated: 05/10/25 0454    Campylobacter species Antigen [[id number]] Collected: 05/10/25 0431    Order Status: Sent Specimen: Stool Updated: 05/10/25 0454    Influenza A & B by Molecular [[id number]]  (Normal) Collected: 05/09/25 1548    Order Status: Completed Specimen: Nasal Swab Updated: 05/09/25 1717     INFLUENZA A MOLECULAR Negative     INFLUENZA B MOLECULAR  Negative            I have reviewed all pertinent labs within the past 24 hours.    Estimated Creatinine Clearance: 21.3 mL/min (A) (based on SCr of 3.9 mg/dL (H)).    Diagnostic Results:  I have reviewed and interpreted all pertinent imaging results/findings within the past 24 hours.  Assessment and Plan:     65 y/o AAM w/ PMHx of stage D HFrEF 2/2 ICM s/p HM3 as DT (12/1/2023), ESRD on HD (MWF), and debility who presented to the emergency department due to anemia.  Patient reports that he has been having about 2 weeks of fatigue and on and off diarrhea, reports that his diarrhea has not been black or bloody previously but reports potentially some darker stools more recently, however reports that it is mostly a watery consistency so he is not able to firmly characterize the diarrhea.  Patient is on to the emergency department by the LVAD coordinators due to concern for anemia with hemoglobin of 6.8.  Patient's baseline hemoglobin is 9-10.  Patient had 2 low-flow alarms on Sunday 05/04/2025, but reports  no repeat alarms since then.  Patient was advised by the LVAD coordinators to take Imodium and drink a Gatorade or Powerade to help with hydration during low-flow alarm on Sunday.    Patient reports he had does not have any abdominal pain, but does have poor appetite, decreased p.o. intake, ongoing diarrhea.  No nausea or vomiting.  Denies any hematemesis or hemoptysis.  He denies any hematuria.    Patient just had dialysis day, he follows a Monday Wednesday Friday schedule.  They pulled 2 L of fluid during dialysis today.    He was recently admitted and discharged on 04/07/2025 for weakness and syncope.  Patient was treated with IV fluids at that time, had an echo completed an HD session in and then was discharged home.  In the emergency department, patients vitals were stable, hemoglobin was 6.8 with a baseline hemoglobin of 9 to 10, hypokalemic with potassium of 3.2, and creatinine elevated in setting of ESRD status post dialysis with 2 L of fluid removal today.  INR was 2.2.  Patient received 1 unit of blood in the ED. no other infectious symptoms besides diarrhea.  CT scan pending.    Anemia  History of anemia of chronic disease, baseline hemoglobin is 9 to 10.  Admitting hemoglobin is 6.8.  S/p 1 unit of blood in the ED on 05/09/2025.  Low-flow alarms x2 on 05/04/2025, short-lived.  Patient given rehydration with improvement and no further low-flow alarms.  Ongoing diarrhea.    -iron studies c/w AOCD/ Anemia 2/2 ESRD   -follow CBC b.i.d.  -consult to GI, appreciate recs  -infectious stool studies pending, cidff did result positive, see diarrhea problem      LVAD (left ventricular assist device) present  Procedure: Device Interrogation Including analysis of device parameters  Current Settings: Ventricular Assist Device  Review of device function is stable/unstable stable        5/10/2025    12:18 PM 5/10/2025     8:03 AM 5/10/2025     4:02 AM 5/10/2025    12:00 AM 5/9/2025     8:31 PM 5/9/2025     6:58 PM  5/9/2025     3:49 PM   TXP LVAD INTERROGATIONS   Type HeartMate3 HeartMate3 HeartMate3 HeartMate3 HeartMate3 HeartMate3 HeartMate3   Flow 3.2 3 3 2.9 3 3.4 3.5   Speed 5000 5000 5000 5000 5000 5000 5050   PI 7.7 8.8 5.7 5.5 5.5 5.5 5.3   Power ([place]) 3.4 3.4 3.4 3.3 3.4 3.4 3.4   LSL 4600 4600 4600 4600 4600     Pulsatility No Pulse No Pulse Intermittent pulse Intermittent pulse        -to low-flow alarms on 5/4, contact LVAD coordinators, encouraged to drink fluids and take Imodium  -ongoing diarrhea for 2 weeks  -INR in therapeutic range, will restart warfarin given low concern for GI bleed      Type 2 diabetes mellitus without complication, without long-term current use of insulin  -A1c  -corrective sliding scale    ESRD (end stage renal disease)  Patient gets HD on Monday Wednesday Friday, s/p hemodialysis session with pulling 2 L today 05/09/2025 on day of admission.  -consult to Nephrology for management of dialysis, appreciate recommendations    Heart failure  Patient euvolemic on exam, lower extremity pitting edema, however albumin very low on admission at 2.4.  Patient on HD, but does still make urine.  We will hold off on diuresis at this time.    Diarrhea  Cdiff pcr positive as of 5/10   -started po vanc     PAF (paroxysmal atrial fibrillation)  Warfarin held on admission in the setting of possible GI bleed on admit, restart warfarin 5/10        Lilli Mckenzie MD  Heart Transplant  [facility] - Cardiac [facility]

## 2025-05-10 NOTE — NURSING
2130 - Call to Dr. Khan, patient map and doppler blood pressure   Map 100, requested diarrhea medication  2230-Reassess blood pressure  and doppler, 102 and map 94  Hydralazine ordered per MD

## 2025-05-10 NOTE — PLAN OF CARE
Patient  has adjusted to device, has had some low flow alarms at home admitted with anemia and gi bleed. Patient received one unit of blood and hemoglobin to 8.2 at this time.  Patient has no s/s of infection to VAD but need to r/o infections to bowel. Patient pressures running high and hydralazine given x 2 tonight. No evidence of HEATHER.

## 2025-05-10 NOTE — CARE UPDATE
C diff testing +. Primary has started vanc.     GI will sign off.     Mg Person, DO  Gastroenterology PGY- V

## 2025-05-10 NOTE — CONSULTS
[facility] - Cardiac Intensive Care  Adult Nutrition  Consult Note    SUMMARY     Recommendations  --Continue Regular Low Sodium Fluid - 2000 mL diet as tolerated and clinically indicated   --Encourage good intakes   --Nursing: please continue to document % meal eaten on flowsheet   --RD to monitor weight, PO intake     Goals: Meet 75-10% EEN/EPN by next RD follow-up  Nutrition Goal Status: new  Communication of RD Recs:  (POC)    Nutrition Discharge Planning     Nutrition Discharge Planning: Therapeutic diet (comments)  Therapeutic diet (comments): Low Sodium diet    Reason for Assessment    Reason For Assessment: consult (Nutritional assessment)  Diagnosis:  (No active principal problem)  General Information Comments: 63 y/o AAM w/ PMHx of stage D HFrEF 2/2 ICM s/p HM3 as DT (12/1/2023), ESRD on HD (MWF), and debility who presented to the emergency department due to anemia. Patient reports that he has been having about 2 weeks of fatigue and on and off diarrhea, reports that his diarrhea has not been black or bloody previously but reports potentially some darker stools more recently, however reports that it is mostly a watery consistency so he is not able to firmly characterize the diarrhea RD consult for nutritional assessment. Spoke to pt and wife at bedside. Pt reports poor appetite d/t ongoing diarrhea. PTA, pt would eat a small portioned meal 2x/day. Pt does not drink any protein drinks at home. Aside from diarrhea, pt denies n/v/c. Pt and wife also deny any recent weight loss, pt has maintained weight.     Nutrition Related Social Determinants of Health: SDOH: Adequate food in home environment    Food Insecurity: No Food Insecurity (4/4/2025)    Hunger Vital Sign     Worried About Running Out of Food in the Last Year: Never true     Ran Out of Food in the Last Year: Never true       Nutrition/Diet History    Spiritual, Cultural Beliefs, Restorationism Practices, Values that Affect Care: no  Food Allergies:  "NKFA  Factors Affecting Nutritional Intake: diarrhea    Anthropometrics    Height: 6' 1" (185.4 cm)  Height (inches): 73 in  Height Method: Stated  Weight: 77.6 kg (171 lb 1.2 oz)  Weight (lb): 171.08 lb  Weight Method: Standard Scale  Ideal Body Weight (IBW), Male: 184 lb  % Ideal Body Weight, Male (lb): 92.98 %  BMI (Calculated): 22.6  BMI Grade: 18.5-24.9 - normal       Lab/Procedures/Meds    Pertinent Labs Reviewed: reviewed - K 3.0, creatinine 3.9, eGFR 17, P 1.7, A1c 6.5, avg glucose 140     Pertinent Medications Reviewed: reviewed - atorvastatin, megastrol, vitamin D      Estimated/Assessed Needs    Weight Used For Calorie Calculations: 77.6 kg (171 lb 1.2 oz)  Energy Calorie Requirements (kcal): 0840-6686 kcal/day (25-30 kcal/kg)  Energy Need Method: Kcal/kg  Protein Requirements: 78-93 g/day (1.0-1.2 g/kg)  Weight Used For Protein Calculations: 77.6 kg (171 lb 1.2 oz)     Estimated Fluid Requirement Method: RDA Method  RDA Method (mL): 1940  CHO Requirement: 243-291 g/day      Nutrition Prescription Ordered    Current Diet Order: Regular Low Sodium Fluid - 2000 mL    Evaluation of Received Nutrient/Fluid Intake    Energy Calories Required: not meeting needs  Protein Required: not meeting needs  Fluid Required:  (Per MD)  Comments: LBM 5/10  % Intake of Estimated Energy Needs: 0 - 25 %  % Meal Intake: 75 - 100 %    PES Statement  Inadequate energy intake related to  (No appetite, diarrhea) as evidenced by Intake <25% estimated needs  Status: New    Nutrition Risk    Level of Risk/Frequency of Follow-up: moderate - high       Monitor and Evaluation    Monitor and Evaluation: Energy intake, Food and beverage intake, Diet order, Food and nutrition knowledge, Weight, Electrolyte and renal panel, Gastrointestinal profile, Glucose/endocrine profile, Inflammatory profile, Lipid profile, Nutrition focused physical findings, Skin       Nutrition Follow-Up    RD Follow-up?: Yes    "

## 2025-05-10 NOTE — SUBJECTIVE & OBJECTIVE
Interval History: pt doing well this AM, cont to have diarrhea. Seen by GI, no concern for Gi bleed. Cdiff testing positive, po vanc ordered.     Continuous Infusions:  Scheduled Meds:   amiodarone  400 mg Oral Daily    amitriptyline  25 mg Oral Once per day on Monday Wednesday Friday    atorvastatin  40 mg Oral QHS    hydrALAZINE  25 mg Oral Q8H    megestroL  40 mg Oral Daily    mupirocin   Nasal BID    pregabalin  100 mg Oral Every other day    traZODone  25 mg Oral QHS    venlafaxine  37.5 mg Oral Daily    vitamin D  1,000 Units Oral Daily     PRN Meds:  Current Facility-Administered Medications:     0.9%  NaCl infusion (for blood administration), , Intravenous, Q24H PRN    loperamide, 2 mg, Oral, QID PRN    Review of patient's allergies indicates:  No Known Allergies  Objective:     Vital Signs (Most Recent):  Temp: 97.9 °F (36.6 °C) (05/10/25 1215)  Pulse: 79 (05/10/25 1055)  Resp: 16 (05/10/25 1215)  BP: (!) 108/0 (05/10/25 1215)  SpO2: 96 % (05/10/25 1215) Vital Signs (24h Range):  Temp:  [97.9 °F (36.6 °C)-99.5 °F (37.5 °C)] 97.9 °F (36.6 °C)  Pulse:  [67-80] 79  Resp:  [16-20] 16  SpO2:  [95 %-98 %] 96 %  BP: ()/(0-91) 108/0     Patient Vitals for the past 72 hrs (Last 3 readings):   Weight   05/10/25 1456 77.6 kg (171 lb 1.2 oz)   05/09/25 2022 77.6 kg (170 lb 15.8 oz)   05/09/25 1515 77.6 kg (171 lb)     Body mass index is 22.57 kg/m².      Intake/Output Summary (Last 24 hours) at 5/10/2025 1520  Last data filed at 5/9/2025 2200  Gross per 24 hour   Intake 491.25 ml   Output --   Net 491.25 ml       Hemodynamic Parameters:       Telemetry: reviewed       Physical Exam  Vitals and nursing note reviewed.   Constitutional:       Appearance: He is normal weight.   HENT:      Head: Normocephalic and atraumatic.      Right Ear: External ear normal.      Left Ear: External ear normal.      Nose: Nose normal.      Mouth/Throat:      Mouth: Mucous membranes are moist.      Pharynx: Oropharynx is clear.  "  Eyes:      Conjunctiva/sclera: Conjunctivae normal.   Cardiovascular:      Comments: Nl vad hum  Pulmonary:      Effort: Pulmonary effort is normal.      Breath sounds: Normal breath sounds.   Abdominal:      General: Abdomen is flat. Bowel sounds are normal. There is no distension.      Palpations: Abdomen is soft.   Musculoskeletal:      Cervical back: Normal range of motion.      Right lower leg: No edema.      Left lower leg: No edema.   Skin:     General: Skin is warm.   Neurological:      Mental Status: He is alert. Mental status is at baseline.   Psychiatric:         Mood and Affect: Mood normal.         Behavior: Behavior normal.            Significant Labs:  CBC:  Recent Labs   Lab 05/09/25  1618 05/10/25  0052 05/10/25  0745   WBC 4.21 5.10 5.34   RBC 2.85* 3.33* 3.24*   HGB 6.8* 8.2* 8.1*   HCT 23.4* 27.8* 27.5*    200 205   MCV 82 84 85   MCH 23.9* 24.6* 25.0*   MCHC 29.1* 29.5* 29.5*     BNP:  No results for input(s): "BNP" in the last 168 hours.    Invalid input(s): "BNPTRIAGELBLO"  CMP:  Recent Labs   Lab 05/09/25  1618 05/10/25  0302    91   CALCIUM 8.0* 7.7*   ALBUMIN 2.4*  --    PROT 6.8  --     142   K 3.2* 3.0*   CO2 27 27    106   BUN 15 18   CREATININE 3.6* 3.9*   ALKPHOS 84  --    ALT 15  --    AST 23  --    BILITOT 0.4  --       Coagulation:   Recent Labs   Lab 05/08/25  1024 05/09/25  1618 05/10/25  0302   INR 2.0* 2.2* 2.4*     LDH:  Recent Labs   Lab 05/10/25  0302   *     Microbiology:  Microbiology Results (last 7 days)       Procedure Component Value Units Date/Time    C Diff Toxin by PCR [[id number]]  (Abnormal) Collected: 05/10/25 0431    Order Status: Completed Specimen: Stool Updated: 05/10/25 1458     Clostridioides difficile toxin PCR Positive    Clostridium difficile EIA [[id number]]  (Abnormal) Collected: 05/10/25 0431    Order Status: Completed Specimen: Stool Updated: 05/10/25 1413     C. DIFFICILE GDH AG Positive     Clostridioides difficile " Toxin A/B Negative    E. coli 0157 antigen [[id number]] Collected: 05/10/25 0431    Order Status: Sent Specimen: Stool Updated: 05/10/25 0454    Stool culture [[id number]] Collected: 05/10/25 0431    Order Status: Sent Specimen: Stool Updated: 05/10/25 0454    Campylobacter species Antigen [[id number]] Collected: 05/10/25 0431    Order Status: Sent Specimen: Stool Updated: 05/10/25 0454    Influenza A & B by Molecular [[id number]]  (Normal) Collected: 05/09/25 1548    Order Status: Completed Specimen: Nasal Swab Updated: 05/09/25 1717     INFLUENZA A MOLECULAR Negative     INFLUENZA B MOLECULAR  Negative            I have reviewed all pertinent labs within the past 24 hours.    Estimated Creatinine Clearance: 21.3 mL/min (A) (based on SCr of 3.9 mg/dL (H)).    Diagnostic Results:  I have reviewed and interpreted all pertinent imaging results/findings within the past 24 hours.

## 2025-05-10 NOTE — ASSESSMENT & PLAN NOTE
Procedure: Device Interrogation Including analysis of device parameters  Current Settings: Ventricular Assist Device  Review of device function is stable/unstable stable        5/10/2025    12:18 PM 5/10/2025     8:03 AM 5/10/2025     4:02 AM 5/10/2025    12:00 AM 5/9/2025     8:31 PM 5/9/2025     6:58 PM 5/9/2025     3:49 PM   TXP LVAD INTERROGATIONS   Type HeartMate3 HeartMate3 HeartMate3 HeartMate3 HeartMate3 HeartMate3 HeartMate3   Flow 3.2 3 3 2.9 3 3.4 3.5   Speed 5000 5000 5000 5000 5000 5000 5050   PI 7.7 8.8 5.7 5.5 5.5 5.5 5.3   Power ([place]) 3.4 3.4 3.4 3.3 3.4 3.4 3.4   LSL 4600 4600 4600 4600 4600     Pulsatility No Pulse No Pulse Intermittent pulse Intermittent pulse        -to low-flow alarms on 5/4, contact LVAD coordinators, encouraged to drink fluids and take Imodium  -ongoing diarrhea for 2 weeks  -INR in therapeutic range, will restart warfarin given low concern for GI bleed

## 2025-05-10 NOTE — PROGRESS NOTES
05/10/2025  Monet Aguiar    Current provider:  Walter Mcintyre MD    Device interrogation:      5/10/2025    12:18 PM 5/10/2025     8:03 AM 5/10/2025     4:02 AM 5/10/2025    12:00 AM 5/9/2025     8:31 PM 5/9/2025     6:58 PM 5/9/2025     3:49 PM   TXP LVAD INTERROGATIONS   Type HeartMate3 HeartMate3 HeartMate3 HeartMate3 HeartMate3 HeartMate3 HeartMate3   Flow 3.2 3 3 2.9 3 3.4 3.5   Speed 5000 5000 5000 5000 5000 5000 5050   PI 7.7 8.8 5.7 5.5 5.5 5.5 5.3   Power ([place]) 3.4 3.4 3.4 3.3 3.4 3.4 3.4   LSL 4600 4600 4600 4600 4600     Pulsatility No Pulse No Pulse Intermittent pulse Intermittent pulse             Rounded on [facility] to ensure all mechanical assist device settings (IABP or VAD) were appropriate and all parameters were within limits.  I was able to ensure all back up equipment was present, the staff had no issues, and the Perfusion Department daily rounding was complete.      For implantable VADs: Interrogation of Ventricular assist device was performed with analysis of device parameters and review of device function. I have personally reviewed the interrogation findings and agree with findings as stated.     In emergency, the nursing units have been notified to contact the perfusion department either by:  Calling [phone] from 630am to 4pm Mon thru Fri, utilizing the On-Call Finder functionality of Epic and searching for Perfusion, or by contacting the hospital  from 4pm to 630am and on weekends and asking to speak with the perfusionist on call.    12:28 PM

## 2025-05-10 NOTE — CONSULTS
[facility] - Cardiac Intensive Care  Nephrology  Consult Note    Patient Name: Radha Abbott  MRN: [id number]  Admission Date: 5/9/2025  Hospital Length of Stay: 1 days  Attending Provider: Walter Mcintyre MD   Primary Care Physician: Vasu Kong MD  Principal Problem:<principal problem not specified>    Inpatient consult to Nephrology  Consult performed by: Norm Stearns NP  Consult ordered by: Lilli Mckenzie MD  Reason for consult: ESRD        Subjective:     HPI: 66 y/o AAM w/ PMHx of stage D HFrEF 2/2 ICM s/p HM3 as DT (12/1/2023), ESRD on HD (MWF), and debility who presented to the emergency department due to anemia.  Patient reports that he has been having about 2 weeks of fatigue and on and off diarrhea, reports that his diarrhea has not been black or bloody previously but reports potentially some darker stools more recently, however reports that it is mostly a watery consistency so he is not able to firmly characterize the diarrhea.  Patient is on to the emergency department by the LVAD coordinators due to concern for anemia with hemoglobin of 6.8.  Patient's baseline hemoglobin is 9-10.  Patient had 2 low-flow alarms on Sunday 05/04/2025, but reports no repeat alarms since then.     Patient reports he had does not have any abdominal pain, but does have poor appetite, decreased p.o. intake, ongoing diarrhea.  No nausea or vomiting.  Denies any hematemesis or hemoptysis.  He denies any hematuria.     Patient just had dialysis day, he follows a Monday Wednesday Friday schedule.  They pulled 2 L of fluid during dialysis on Friday.  Nephrology has been consulted for ESRD management while IP      Past Medical History:   Diagnosis Date    Aspiration pneumonia of both lungs 06/17/2024    CAD (coronary artery disease)     CHF (congestive heart failure)     Delirium 06/16/2024    Diabetes mellitus     HFrEF (heart failure with reduced ejection fraction)     Hypoglycemia 06/12/2024    ICD (implantable  cardioverter-defibrillator) in place     LVAD (left ventricular assist device) present 12/01/2023    MI, old     PAF (paroxysmal atrial fibrillation) 10/11/2023    Stage 4 chronic kidney disease 02/15/2024    Type 2 diabetes mellitus without complication, without long-term current use of insulin 10/07/2023       Past Surgical History:   Procedure Laterality Date    ANGIOPLASTY-VENOUS ARTERY Right 12/1/2023    Procedure: ANGIOPLASTY-VENOUS ARTERY, RIGHT FEMORAL;  Surgeon: Yuri Washington MD;  Location: [facility] OR [facility] FLR;  Service: Cardiovascular;  Laterality: Right;    AORTIC VALVULOPLASTY N/A 12/1/2023    Procedure: REPAIR, AORTIC VALVE;  Surgeon: Yuri Washington MD;  Location: [facility] OR [facility] FLR;  Service: Cardiovascular;  Laterality: N/A;    CARDIAC SURGERY      CLOSURE N/A 12/1/2023    Procedure: CLOSURE, TEMPORARY;  Surgeon: Yuri Washington MD;  Location: [facility] OR [facility] FLR;  Service: Cardiovascular;  Laterality: N/A;    DRAINAGE OF PLEURAL EFFUSION  12/4/2023    Procedure: DRAINAGE, PLEURAL EFFUSION;  Surgeon: Yuri Washington MD;  [facility];  Service: Cardiovascular;;    INSERTION OF GRAFT TO PERICARDIUM  12/4/2023    Procedure: INSERTION, GRAFT, PERICARDIUM;  Surgeon: Yuri Washington MD;  [facility];  Service: Cardiovascular;;    INSERTION OF INTRA-AORTIC BALLOON ASSIST DEVICE Right 11/21/2023    Procedure: INSERTION, INTRA-AORTIC BALLOON PUMP;  Surgeon: Finn Cohn MD;  Location: [facility] CATH LAB;  Service: Cardiology;  Laterality: Right;    LEFT VENTRICULAR ASSIST DEVICE Left 12/1/2023    Procedure: INSERTION-LEFT VENTRICULAR ASSIST DEVICE;  Surgeon: Yuri Washington MD;  Location: [facility] OR [facility] FLR;  Service: Cardiovascular;  Laterality: Left;  REDO STERNOTOMY - REDO SAW NEEDED FOR CASE    LYSIS OF ADHESIONS  12/1/2023    Procedure: LYSIS, ADHESIONS;  Surgeon: Yuri Washington MD;  Location: [facility] OR 2ND FLR;  Service: Cardiovascular;;    PLACEMENT OF SWAN ROLANDO CATHETER WITH IMAGING  GUIDANCE  11/20/2023    Procedure: INSERTION, CATHETER, SWAN-ROLANDO, WITH IMAGING GUIDANCE;  Surgeon: Sajan Hurley MD;  Location: [facility] CATH LAB;  Service: Cardiology;;    REMOVAL OF TUNNELED CENTRAL VENOUS CATHETER (CVC) N/A 3/1/2024    Procedure: REMOVAL, CATHETER, CENTRAL VENOUS, TUNNELED;  Surgeon: Seble Aguilar MD;  Location: [facility] CATH LAB;  Service: Interventional Nephrology;  Laterality: N/A;    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 12/1/2023    Procedure: REPAIR, ANEURYSM, ARTERY, FEMORAL;  Surgeon: Yuri Washington MD;  [address]  Service: Cardiovascular;  Laterality: Right;  Right Femoral Artery Repair    RIGHT HEART CATHETERIZATION Right 10/10/2023    Procedure: INSERTION, CATHETER, RIGHT HEART;  Surgeon: Bin Gandhi MD;  Location: [facility] CATH LAB;  Service: Cardiology;  Laterality: Right;    RIGHT HEART CATHETERIZATION Right 10/13/2023    Procedure: INSERTION, CATHETER, RIGHT HEART;  Surgeon: Walter Mcintyre MD;  Location: [facility] CATH LAB;  Service: Cardiology;  Laterality: Right;    RIGHT HEART CATHETERIZATION  11/13/2023    RIGHT HEART CATHETERIZATION Right 11/13/2023    Procedure: INSERTION, CATHETER, RIGHT HEART;  Surgeon: Juventino Bermudez Jr., MD;  Location: [facility] CATH LAB;  Service: Cardiology;  Laterality: Right;    RIGHT HEART CATHETERIZATION Right 11/20/2023    Procedure: INSERTION, CATHETER, RIGHT HEART;  Surgeon: Sajan Hurley MD;  Location: [facility] CATH LAB;  Service: Cardiology;  Laterality: Right;    RIGHT HEART CATHETERIZATION Right 1/22/2024    Procedure: INSERTION, CATHETER, RIGHT HEART;  Surgeon: Brayan Ocampo MD;  Location: [facility] CATH LAB;  Service: Cardiology;  Laterality: Right;    STERNAL WOUND CLOSURE N/A 12/4/2023    Procedure: CLOSURE, WOUND, STERNUM;  Surgeon: Yuri Washington MD;  [facility];  Service: Cardiovascular;  Laterality: N/A;    STERNOTOMY N/A 12/1/2023    Procedure: STERNOTOMY, REDO;  Surgeon: Yuri Washington,  MD;  Location: [facility] OR [address] [place] [address];  Service: Cardiovascular;  Laterality: N/A;    VALVULOPLASTY, MITRAL VALVE N/A 12/1/2023    Procedure: VALVULOPLASTY, MITRAL VALVE;  Surgeon: Yuri Washington MD;  Location: [facility] OR [address] [place] [address];  Service: Cardiovascular;  Laterality: N/A;       Review of patient's allergies indicates:  No Known Allergies  Current Facility-Administered Medications   Medication Frequency    0.9%  NaCl infusion (for blood administration) Q24H PRN    amiodarone tablet 400 mg Daily    amitriptyline tablet 25 mg Once per day on Monday Wednesday Friday    atorvastatin tablet 40 mg QHS    hydrALAZINE tablet 25 mg Q8H    loperamide capsule 2 mg QID PRN    megestroL tablet 40 mg Daily    mupirocin 2 % ointment BID    pregabalin capsule 100 mg Every other day    sevelamer carbonate tablet 800 mg TID WM    trazodone split tablet 25 mg QHS    venlafaxine tablet 37.5 mg Daily    vitamin D 1000 units tablet 1,000 Units Daily     Family History    None       Tobacco Use    Smoking status: Former     Current packs/day: 0.50     Types: Cigarettes    Smokeless tobacco: Not on file   Substance and Sexual Activity    Alcohol use: Yes     Comment: rarely    Drug use: No    Sexual activity: Not Currently     Partners: Female     Review of Systems   All other systems reviewed and are negative.    Objective:     Vital Signs (Most Recent):  Temp: 97.9 °F (36.6 °C) (05/10/25 1215)  Pulse: 79 (05/10/25 1055)  Resp: 16 (05/10/25 1215)  BP: (!) 108/0 (05/10/25 1215)  SpO2: 96 % (05/10/25 1215) Vital Signs (24h Range):  Temp:  [97.9 °F (36.6 °C)-99.5 °F (37.5 °C)] 97.9 °F (36.6 °C)  Pulse:  [67-85] 79  Resp:  [16-20] 16  SpO2:  [95 %-98 %] 96 %  BP: ()/(0-91) 108/0     Weight: 77.6 kg (170 lb 15.8 oz) (05/09/25 2022)  Body mass index is 22.56 kg/m².  Body surface area is 2 meters squared.    I/O last 3 completed shifts:  In: 491.3 [I.V.:50; Blood:441.3]  Out: -      Physical Exam  Vitals and nursing note reviewed.    Constitutional:       Appearance: He is normal weight. He is not ill-appearing or toxic-appearing.   HENT:      Head: Normocephalic and atraumatic.      Right Ear: External ear normal.      Left Ear: External ear normal.      Nose: Nose normal.      Mouth/Throat:      Mouth: Mucous membranes are moist.      Pharynx: Oropharynx is clear.   Eyes:      Conjunctiva/sclera: Conjunctivae normal.   Cardiovascular:      Comments: LVAD hum  Pulmonary:      Effort: Pulmonary effort is normal.      Breath sounds: Normal breath sounds.   Abdominal:      General: Bowel sounds are normal.      Palpations: Abdomen is soft.   Musculoskeletal:      Cervical back: Normal range of motion.      Right lower leg: Edema present.      Left lower leg: Edema present.   Skin:     General: Skin is warm.   Neurological:      Mental Status: He is alert. Mental status is at baseline.   Psychiatric:         Mood and Affect: Mood normal.         Behavior: Behavior normal.          Significant Labs:  CBC:   Recent Labs   Lab 05/10/25  0745   WBC 5.34   RBC 3.24*   HGB 8.1*   HCT 27.5*      MCV 85   MCH 25.0*   MCHC 29.5*     CMP:   Recent Labs   Lab 05/09/25  1618 05/10/25  0302    91   CALCIUM 8.0* 7.7*   ALBUMIN 2.4*  --    PROT 6.8  --     142   K 3.2* 3.0*   CO2 27 27    106   BUN 15 18   CREATININE 3.6* 3.9*   ALKPHOS 84  --    ALT 15  --    AST 23  --    BILITOT 0.4  --        Assessment/Plan:     Renal/  ESRD (end stage renal disease)  ESRD on iHD [facility] ([facility])  3 hours  EDW:  77.6 kg  [facility]    Plan/Recommendations:  -No emergent need for RRT at this time  -plan to continue 3x week dialysis while IP, next HD on Monday.  -K and phos low this morning, rather than replacing would liberalize his diet.  No need for renal diet unless hyperphosphatemia/hyperkalemia becomes an issue.  -1.5L fluid restrictions  -continue strict I/O's  -hold phos binder until K > 5    Anemia of ESRD  -On Micera as OP q 2 weeks;   defer VALENTIN to his OP dialysis unit  -PRN transfusion per primary team      Norm Vidal, NP  Nephrology  [facility] - Cardiac Intensive Care

## 2025-05-10 NOTE — PLAN OF CARE
Recommendations  --Continue Regular Low Sodium Fluid - 2000 mL diet as tolerated and clinically indicated   --Encourage good intakes   --Nursing: please continue to document % meal eaten on flowsheet   --RD to monitor weight, PO intake     Goals: Meet 75-10% EEN/EPN by next RD follow-up  Nutrition Goal Status: new  Communication of RD Recs:  (POC)

## 2025-05-10 NOTE — ASSESSMENT & PLAN NOTE
Procedure: Device Interrogation Including analysis of device parameters  Current Settings: Ventricular Assist Device  Review of device function is stable/unstable stable        5/11/2025     7:43 AM 5/11/2025     4:49 AM 5/10/2025    11:32 PM 5/10/2025     8:46 PM 5/10/2025     3:42 PM 5/10/2025    12:18 PM 5/10/2025     8:03 AM   TXP LVAD INTERROGATIONS   Type HeartMate3 HeartMate3 HeartMate3 HeartMate3 HeartMate3 HeartMate3 HeartMate3   Flow 3.8 3.6 4.1 3.4 3 3.2 3   Speed 5000 5000 5000 5000 5000 5000 5000   PI 4.9 5.6 4.4 7 5.3 7.7 8.8   Power ([place]) 3.4 3.4 3.4 3.4 3.4 3.4 3.4   LSL 4600 4600 4600 4600 4600 4600 4600   Pulsatility No Pulse No Pulse No Pulse No Pulse  No Pulse No Pulse     -to low-flow alarms on 5/4, contact LVAD coordinators, encouraged to drink fluids and take Imodium  -ongoing diarrhea for 2 weeks  -INR in therapeutic range, will restart warfarin given low concern for GI bleed  -MAPS elevated in 90s-100s, unclear if LFA from 5/4 and 5/7 are 2/2 htn vs diarrhea with low flow.   -required 10mg hydralazine overnight 5/9 due to high mapss 90s-100s, initiated hydralazine 25 mg TID on 5/10. Increased to 50mg TID on 5/11 on d/c given MAPs high 80s-90s still on hydralazine 25 mg dose. Unclear if LFA 2/2 diarrhea vs uncontrolled BP at home.

## 2025-05-10 NOTE — PLAN OF CARE
Problem: Occupational Therapy  Goal: Occupational Therapy Goal  Description: Goals to be met by: 5/24/25     Patient will increase functional independence with ADLs by performing:    UE Dressing with Set-up Assistance.  LE Dressing with Supervision.  Grooming while seated at sink with Modified [place].  Toileting from bedside commode with Stand-by Assistance for hygiene and clothing management.   Toilet transfer to bedside commode with Stand-by Assistance.    Outcome: Progressing

## 2025-05-10 NOTE — ASSESSMENT & PLAN NOTE
ESRD on iHD MWF  [facility] ([facility])  3 hours  EDW:  77.6 kg  [facility]    Plan/Recommendations:  -No emergent need for RRT at this time  -plan to continue 3x week dialysis while IP, next HD on Monday.  -K and phos low this morning, rather than replacing would liberalize his diet.  No need for renal diet unless hyperphosphatemia/hyperkalemia becomes an issue.  -1.5L fluid restrictions  -continue strict I/O's  -hold phos binder until K > 5    Anemia of ESRD  -On Micera as OP q 2 weeks;  defer VALENTIN to his OP dialysis unit  -PRN transfusion per primary team

## 2025-05-10 NOTE — ASSESSMENT & PLAN NOTE
History of anemia of chronic disease, baseline hemoglobin is 9 to 10.  Admitting hemoglobin is 6.8.  S/p 1 unit of blood in the ED on 05/09/2025.  Low-flow alarms x2 on 05/04/2025, short-lived.  Patient given rehydration with improvement and no further low-flow alarms.  Ongoing diarrhea.    -iron studies c/w AOCD/ Anemia 2/2 ESRD   -follow CBC b.i.d.  -consult to GI, appreciate recs  -infectious stool studies pending, cidff did result positive, see diarrhea problem

## 2025-05-10 NOTE — PT/OT/SLP EVAL
Occupational Therapy   Evaluation    Name: Radha Abbott  MRN: [id number]  Admitting Diagnosis: <principal problem not specified>  Recent Surgery: * No surgery found *      Recommendations:     Discharge Recommendations: Low Intensity Therapy  Discharge Equipment Recommendations:   (elevated bedside commode; bed side rails)  Barriers to discharge:  None    Assessment:     Radha Abbott is a 65 y.o. male with a medical diagnosis of GIB/anemia. He presents alert and cooperative. Pt functioning close to baseline, but not at PLOF.  Pt s/p LVAD 12/2023.  Performance deficits affecting function: weakness, impaired endurance, impaired self care skills, impaired functional mobility, decreased coordination, gait instability, impaired balance, impaired cardiopulmonary response to activity.      Rehab Prognosis: Good; patient would benefit from acute skilled OT services to address these deficits and reach maximum level of function.       Plan:     Patient to be seen 3 x/week to address the above listed problems via self-care/home management, therapeutic activities, therapeutic exercises  Plan of Care Expires: 05/24/25  Plan of Care Reviewed with: patient, spouse    Subjective     Chief Complaint: diarrhea  Patient/Family Comments/goals: to go home    Occupational Profile:  Occupational Profile:  Pt lives with spouse in one story home with no JACKIE. When spouse is working, pt's sister is with the pt at home.   Typically, pt is modified independent for ADL. Sponge bathes and performs grooming while seated on stool. Pt bathes in tub weekly. Pt mostly uses w/c and upright walker, and spends most of the day in the recliner. Pt has had recent falls and hospital admissions with falls/syncope.   Pt uses upright walker, RW, rollator, BSC and has WIS with BIB.   Pt has been going to OP PT/OT 2x/week as well as dialysis    Pain/Comfort:  Pain Rating 1: 0/10  Pain Rating Post-Intervention 2: 0/10    Patients cultural, spiritual, Mandaeism  conflicts given the current situation: no    Objective:     Communicated with: RN prior to session.  Patient found supine with telemetry, LVAD upon OT entry to room.    General Precautions: Standard, fall, LVAD  Orthopedic Precautions:    Braces:    Respiratory Status: Room air    Occupational Performance:    Bed Mobility:    Patient completed Scooting/Bridging with stand by assistance  Patient completed Supine to Sit with stand by assistance and with side rail    Functional Mobility/Transfers:  Patient completed Sit <> Stand Transfer with contact guard assistance  with  HHA  Patient completed Bed <> Chair Transfer using Step Transfer technique with contact guard assistance with no assistive device  Functional Mobility: Minimal A to/from bathroom without AD    Activities of Daily Living:  Feeding:  independence    Grooming: stand by assistance for set-up  Upper Body Dressing: minimum assistance    Lower Body Dressing: minimum assistance      Cognitive/Visual Perceptual:  Oriented to: Person, Place, Time and Situation  Follows Commands/attention: Follows multistep  commands  Communication: clear/fluent  Memory:  No Deficits noted  Safety awareness/insight to disability: intact  Coping skills/emotional control: Appropriate to situation    Physical Exam:  Postural examination/scapula alignment:    -       No postural abnormalities identified  Sensation:    -       Intact  Upper Extremity Range of Motion:     -       Right Upper Extremity: WFL  -       Left Upper Extremity: WFL  Upper Extremity Strength:    -       Right Upper Extremity: WFL  -       Left Upper Extremity: WFL   Strength:    -       Right Upper Extremity: WFL  -       Left Upper Extremity: WFL  Fine Motor Coordination:    -       Intact  Gross motor coordination:   WFL      AMPAC 6 Click ADL:  AMPAC Total Score: 19    Treatment & Education:  Pt ed on OT POC  Pt ed on importance of OOB and activity participation  Pt deferred transition to battery  Pt  ed on ROM ex's daily for increased overall strength and endurance to reduce risk of hospital acquired weakness  Pt ed on safety with ADL and fall risk  Reinforced use of call button to contact nursing staff for assistance with mobility    Patient left up in chair with all lines intact, call button in reach, RN notified, and spouse present    GOALS:   Multidisciplinary Problems       Occupational Therapy Goals          Problem: Occupational Therapy    Goal Priority Disciplines Outcome Interventions   Occupational Therapy Goal     OT, PT/OT Progressing    Description: Goals to be met by: 5/24/25     Patient will increase functional independence with ADLs by performing:    UE Dressing with Set-up Assistance.  LE Dressing with Supervision.  Grooming while seated at sink with Modified [place].  Toileting from bedside commode with Stand-by Assistance for hygiene and clothing management.   Toilet transfer to bedside commode with Stand-by Assistance.                         DME Justifications:  No DME recommended requiring DME justifications    History:     Past Medical History:   Diagnosis Date    Aspiration pneumonia of both lungs 06/17/2024    CAD (coronary artery disease)     CHF (congestive heart failure)     Delirium 06/16/2024    Diabetes mellitus     HFrEF (heart failure with reduced ejection fraction)     Hypoglycemia 06/12/2024    ICD (implantable cardioverter-defibrillator) in place     LVAD (left ventricular assist device) present 12/01/2023    MI, old     PAF (paroxysmal atrial fibrillation) 10/11/2023    Stage 4 chronic kidney disease 02/15/2024    Type 2 diabetes mellitus without complication, without long-term current use of insulin 10/07/2023         Past Surgical History:   Procedure Laterality Date    ANGIOPLASTY-VENOUS ARTERY Right 12/1/2023    Procedure: ANGIOPLASTY-VENOUS ARTERY, RIGHT FEMORAL;  Surgeon: Yuri Washington MD;  Location: [facility] OR [address] [place] [address];  Service: Cardiovascular;  Laterality: Right;     AORTIC VALVULOPLASTY N/A 12/1/2023    Procedure: REPAIR, AORTIC VALVE;  Surgeon: Yuri Washington MD;  Location: [facility] OR [facility] FLR;  Service: Cardiovascular;  Laterality: N/A;    CARDIAC SURGERY      CLOSURE N/A 12/1/2023    Procedure: CLOSURE, TEMPORARY;  Surgeon: Yuri Washington MD;  Location: [facility] OR [facility] FLR;  Service: Cardiovascular;  Laterality: N/A;    DRAINAGE OF PLEURAL EFFUSION  12/4/2023    Procedure: DRAINAGE, PLEURAL EFFUSION;  Surgeon: Yuri Washington MD;  Location: [facility] OR [facility] FLR;  Service: Cardiovascular;;    INSERTION OF GRAFT TO PERICARDIUM  12/4/2023    Procedure: INSERTION, GRAFT, PERICARDIUM;  Surgeon: Yuri Washington MD;  Location: [facility] OR [facility] FLR;  Service: Cardiovascular;;    INSERTION OF INTRA-AORTIC BALLOON ASSIST DEVICE Right 11/21/2023    Procedure: INSERTION, INTRA-AORTIC BALLOON PUMP;  Surgeon: Finn Cohn MD;  Location: [facility] CATH LAB;  Service: Cardiology;  Laterality: Right;    LEFT VENTRICULAR ASSIST DEVICE Left 12/1/2023    Procedure: INSERTION-LEFT VENTRICULAR ASSIST DEVICE;  Surgeon: Yuri Washington MD;  Location: [facility] OR [facility] FLR;  Service: Cardiovascular;  Laterality: Left;  REDO STERNOTOMY - REDO SAW NEEDED FOR CASE    LYSIS OF ADHESIONS  12/1/2023    Procedure: LYSIS, ADHESIONS;  Surgeon: Yuri Washington MD;  [facility];  Service: Cardiovascular;;    PLACEMENT OF SWAN ROLANDO CATHETER WITH IMAGING GUIDANCE  11/20/2023    Procedure: INSERTION, CATHETER, SWAN-ROLANDO, WITH IMAGING GUIDANCE;  Surgeon: Sajan Hurley MD;  Location: [facility] CATH LAB;  Service: Cardiology;;    REMOVAL OF TUNNELED CENTRAL VENOUS CATHETER (CVC) N/A 3/1/2024    Procedure: REMOVAL, CATHETER, CENTRAL VENOUS, TUNNELED;  Surgeon: Seble Aguilar MD;  Location: [facility] CATH LAB;  Service: Interventional Nephrology;  Laterality: N/A;    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 12/1/2023    Procedure: REPAIR, ANEURYSM, ARTERY, FEMORAL;  Surgeon: Yuri Washington MD;  Location: [facility] OR [facility]  FLR;  Service: Cardiovascular;  Laterality: Right;  Right Femoral Artery Repair    RIGHT HEART CATHETERIZATION Right 10/10/2023    Procedure: INSERTION, CATHETER, RIGHT HEART;  Surgeon: Bin Gandhi MD;  Location: [facility] CATH LAB;  Service: Cardiology;  Laterality: Right;    RIGHT HEART CATHETERIZATION Right 10/13/2023    Procedure: INSERTION, CATHETER, RIGHT HEART;  Surgeon: Walter Mcintyre MD;  Location: [facility] CATH LAB;  Service: Cardiology;  Laterality: Right;    RIGHT HEART CATHETERIZATION  11/13/2023    RIGHT HEART CATHETERIZATION Right 11/13/2023    Procedure: INSERTION, CATHETER, RIGHT HEART;  Surgeon: Juventino Bermudez Jr., MD;  Location: [facility] CATH LAB;  Service: Cardiology;  Laterality: Right;    RIGHT HEART CATHETERIZATION Right 11/20/2023    Procedure: INSERTION, CATHETER, RIGHT HEART;  Surgeon: Sajan Hurley MD;  Location: [facility] CATH LAB;  Service: Cardiology;  Laterality: Right;    RIGHT HEART CATHETERIZATION Right 1/22/2024    Procedure: INSERTION, CATHETER, RIGHT HEART;  Surgeon: Brayan Ocampo MD;  Location: [facility] CATH LAB;  Service: Cardiology;  Laterality: Right;    STERNAL WOUND CLOSURE N/A 12/4/2023    Procedure: CLOSURE, WOUND, STERNUM;  Surgeon: Yuri Washington MD;  Location: [facility] OR 2ND FLR;  Service: Cardiovascular;  Laterality: N/A;    STERNOTOMY N/A 12/1/2023    Procedure: STERNOTOMY, REDO;  Surgeon: Yuri Washington MD;  Location: [facility] OR 2ND FLR;  Service: Cardiovascular;  Laterality: N/A;    VALVULOPLASTY, MITRAL VALVE N/A 12/1/2023    Procedure: VALVULOPLASTY, MITRAL VALVE;  Surgeon: Yuri Washington MD;  Location: [facility] OR 2ND FLR;  Service: Cardiovascular;  Laterality: N/A;       Time Tracking:     OT Date of Treatment: 05/10/25  OT Start Time: 0900  OT Stop Time: 0930  OT Total Time (min): 30 min    Billable Minutes:Evaluation 10  Self Care/Home Management 20    5/10/2025

## 2025-05-10 NOTE — SUBJECTIVE & OBJECTIVE
Past Medical History:   Diagnosis Date    Aspiration pneumonia of both lungs 06/17/2024    CAD (coronary artery disease)     CHF (congestive heart failure)     Delirium 06/16/2024    Diabetes mellitus     HFrEF (heart failure with reduced ejection fraction)     Hypoglycemia 06/12/2024    ICD (implantable cardioverter-defibrillator) in place     LVAD (left ventricular assist device) present 12/01/2023    MI, old     PAF (paroxysmal atrial fibrillation) 10/11/2023    Stage 4 chronic kidney disease 02/15/2024    Type 2 diabetes mellitus without complication, without long-term current use of insulin 10/07/2023       Past Surgical History:   Procedure Laterality Date    ANGIOPLASTY-VENOUS ARTERY Right 12/1/2023    Procedure: ANGIOPLASTY-VENOUS ARTERY, RIGHT FEMORAL;  Surgeon: Yuri Washington MD;  [facility];  Service: Cardiovascular;  Laterality: Right;    AORTIC VALVULOPLASTY N/A 12/1/2023    Procedure: REPAIR, AORTIC VALVE;  Surgeon: Yuri Washington MD;  [facility];  Service: Cardiovascular;  Laterality: N/A;    CARDIAC SURGERY      CLOSURE N/A 12/1/2023    Procedure: CLOSURE, TEMPORARY;  Surgeon: Yuri Washington MD;  Location: [facility] OR [facility] FLR;  Service: Cardiovascular;  Laterality: N/A;    DRAINAGE OF PLEURAL EFFUSION  12/4/2023    Procedure: DRAINAGE, PLEURAL EFFUSION;  Surgeon: Yuri Washington MD;  Location: [facility] OR [facility] FLR;  Service: Cardiovascular;;    INSERTION OF GRAFT TO PERICARDIUM  12/4/2023    Procedure: INSERTION, GRAFT, PERICARDIUM;  Surgeon: Yuri Washington MD;  [facility];  Service: Cardiovascular;;    INSERTION OF INTRA-AORTIC BALLOON ASSIST DEVICE Right 11/21/2023    Procedure: INSERTION, INTRA-AORTIC BALLOON PUMP;  Surgeon: Finn Cohn MD;  Location: [facility] CATH LAB;  Service: Cardiology;  Laterality: Right;    LEFT VENTRICULAR ASSIST DEVICE Left 12/1/2023    Procedure: INSERTION-LEFT VENTRICULAR ASSIST DEVICE;  Surgeon: Yuri Washington MD;   Location: [address] FLR;  Service: Cardiovascular;  Laterality: Left;  REDO STERNOTOMY - REDO SAW NEEDED FOR CASE    LYSIS OF ADHESIONS  12/1/2023    Procedure: LYSIS, ADHESIONS;  Surgeon: Yuri Washington MD;  [address]  Service: Cardiovascular;;    PLACEMENT OF SWAN ROLANDO CATHETER WITH IMAGING GUIDANCE  11/20/2023    Procedure: INSERTION, CATHETER, SWAN-ROLANDO, WITH IMAGING GUIDANCE;  Surgeon: Sajan Hurley MD;  Location: [facility] CATH LAB;  Service: Cardiology;;    REMOVAL OF TUNNELED CENTRAL VENOUS CATHETER (CVC) N/A 3/1/2024    Procedure: REMOVAL, CATHETER, CENTRAL VENOUS, TUNNELED;  Surgeon: Seble Aguilar MD;  Location: [facility] CATH LAB;  Service: Interventional Nephrology;  Laterality: N/A;    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 12/1/2023    Procedure: REPAIR, ANEURYSM, ARTERY, FEMORAL;  Surgeon: Yuri Washington MD;  [address]  Service: Cardiovascular;  Laterality: Right;  Right Femoral Artery Repair    RIGHT HEART CATHETERIZATION Right 10/10/2023    Procedure: INSERTION, CATHETER, RIGHT HEART;  Surgeon: Bin Gandhi MD;  Location: [facility] CATH LAB;  Service: Cardiology;  Laterality: Right;    RIGHT HEART CATHETERIZATION Right 10/13/2023    Procedure: INSERTION, CATHETER, RIGHT HEART;  Surgeon: Walter Mcintyre MD;  Location: [facility] CATH LAB;  Service: Cardiology;  Laterality: Right;    RIGHT HEART CATHETERIZATION  11/13/2023    RIGHT HEART CATHETERIZATION Right 11/13/2023    Procedure: INSERTION, CATHETER, RIGHT HEART;  Surgeon: Juventino Bermudez Jr., MD;  Location: [facility] CATH LAB;  Service: Cardiology;  Laterality: Right;    RIGHT HEART CATHETERIZATION Right 11/20/2023    Procedure: INSERTION, CATHETER, RIGHT HEART;  Surgeon: Sajan Hurley MD;  Location: [facility] CATH LAB;  Service: Cardiology;  Laterality: Right;    RIGHT HEART CATHETERIZATION Right 1/22/2024    Procedure: INSERTION, CATHETER, RIGHT HEART;  Surgeon: Brayan Ocampo MD;  Location: [facility]  LAB;  Service: Cardiology;  Laterality: Right;    STERNAL WOUND CLOSURE N/A 12/4/2023    Procedure: CLOSURE, WOUND, STERNUM;  Surgeon: Yuri Washington MD;  [facility];  Service: Cardiovascular;  Laterality: N/A;    STERNOTOMY N/A 12/1/2023    Procedure: STERNOTOMY, REDO;  Surgeon: Yuri Washington MD;  Location: [facility] OR 2ND FLR;  Service: Cardiovascular;  Laterality: N/A;    VALVULOPLASTY, MITRAL VALVE N/A 12/1/2023    Procedure: VALVULOPLASTY, MITRAL VALVE;  Surgeon: Yuri Washington MD;  Location: [facility] OR 2ND FLR;  Service: Cardiovascular;  Laterality: N/A;       Review of patient's allergies indicates:  No Known Allergies  Current Facility-Administered Medications   Medication Frequency    0.9%  NaCl infusion (for blood administration) Q24H PRN    amiodarone tablet 400 mg Daily    amitriptyline tablet 25 mg Once per day on Monday Wednesday Friday    atorvastatin tablet 40 mg QHS    hydrALAZINE tablet 25 mg Q8H    loperamide capsule 2 mg QID PRN    megestroL tablet 40 mg Daily    mupirocin 2 % ointment BID    pregabalin capsule 100 mg Every other day    sevelamer carbonate tablet 800 mg TID WM    trazodone split tablet 25 mg QHS    venlafaxine tablet 37.5 mg Daily    vitamin D 1000 units tablet 1,000 Units Daily     Family History    None       Tobacco Use    Smoking status: Former     Current packs/day: 0.50     Types: Cigarettes    Smokeless tobacco: Not on file   Substance and Sexual Activity    Alcohol use: Yes     Comment: rarely    Drug use: No    Sexual activity: Not Currently     Partners: Female     Review of Systems   All other systems reviewed and are negative.    Objective:     Vital Signs (Most Recent):  Temp: 97.9 °F (36.6 °C) (05/10/25 1215)  Pulse: 79 (05/10/25 1055)  Resp: 16 (05/10/25 1215)  BP: (!) 108/0 (05/10/25 1215)  SpO2: 96 % (05/10/25 1215) Vital Signs (24h Range):  Temp:  [97.9 °F (36.6 °C)-99.5 °F (37.5 °C)] 97.9 °F (36.6 °C)  Pulse:  [67-85] 79  Resp:  [16-20] 16  SpO2:   [95 %-98 %] 96 %  BP: ()/(0-91) 108/0     Weight: 77.6 kg (170 lb 15.8 oz) (05/09/25 2022)  Body mass index is 22.56 kg/m².  Body surface area is 2 meters squared.    I/O last 3 completed shifts:  In: 491.3 [I.V.:50; Blood:441.3]  Out: -      Physical Exam  Vitals and nursing note reviewed.   Constitutional:       Appearance: He is normal weight. He is not ill-appearing or toxic-appearing.   HENT:      Head: Normocephalic and atraumatic.      Right Ear: External ear normal.      Left Ear: External ear normal.      Nose: Nose normal.      Mouth/Throat:      Mouth: Mucous membranes are moist.      Pharynx: Oropharynx is clear.   Eyes:      Conjunctiva/sclera: Conjunctivae normal.   Cardiovascular:      Comments: LVAD hum  Pulmonary:      Effort: Pulmonary effort is normal.      Breath sounds: Normal breath sounds.   Abdominal:      General: Bowel sounds are normal.      Palpations: Abdomen is soft.   Musculoskeletal:      Cervical back: Normal range of motion.      Right lower leg: Edema present.      Left lower leg: Edema present.   Skin:     General: Skin is warm.   Neurological:      Mental Status: He is alert. Mental status is at baseline.   Psychiatric:         Mood and Affect: Mood normal.         Behavior: Behavior normal.          Significant Labs:  CBC:   Recent Labs   Lab 05/10/25  0745   WBC 5.34   RBC 3.24*   HGB 8.1*   HCT 27.5*      MCV 85   MCH 25.0*   MCHC 29.5*     CMP:   Recent Labs   Lab 05/09/25  1618 05/10/25  0302    91   CALCIUM 8.0* 7.7*   ALBUMIN 2.4*  --    PROT 6.8  --     142   K 3.2* 3.0*   CO2 27 27    106   BUN 15 18   CREATININE 3.6* 3.9*   ALKPHOS 84  --    ALT 15  --    AST 23  --    BILITOT 0.4  --

## 2025-05-10 NOTE — HPI
64 y/o AAM w/ PMHx of stage D HFrEF 2/2 ICM s/p HM3 as DT (12/1/2023), ESRD on HD (MWF), and debility who presented to the emergency department due to anemia.  Patient reports that he has been having about 2 weeks of fatigue and on and off diarrhea, reports that his diarrhea has not been black or bloody previously but reports potentially some darker stools more recently, however reports that it is mostly a watery consistency so he is not able to firmly characterize the diarrhea.  Patient is on to the emergency department by the LVAD coordinators due to concern for anemia with hemoglobin of 6.8.  Patient's baseline hemoglobin is 9-10.  Patient had 2 low-flow alarms on Sunday 05/04/2025, but reports no repeat alarms since then.     Patient reports he had does not have any abdominal pain, but does have poor appetite, decreased p.o. intake, ongoing diarrhea.  No nausea or vomiting.  Denies any hematemesis or hemoptysis.  He denies any hematuria.     Patient just had dialysis day, he follows a Monday Wednesday Friday schedule.  They pulled 2 L of fluid during dialysis on Friday.  Nephrology has been consulted for ESRD management while IP

## 2025-05-11 VITALS
HEIGHT: 73 IN | WEIGHT: 171.06 LBS | OXYGEN SATURATION: 96 % | TEMPERATURE: 98 F | HEART RATE: 86 BPM | RESPIRATION RATE: 18 BRPM | BODY MASS INDEX: 22.67 KG/M2 | SYSTOLIC BLOOD PRESSURE: 80 MMHG

## 2025-05-11 PROBLEM — A04.72 CLOSTRIDIUM DIFFICILE DIARRHEA: Status: ACTIVE | Noted: 2025-05-11

## 2025-05-11 LAB
ANION GAP (OHS): 8 MMOL/L (ref 8–16)
BUN SERPL-MCNC: 25 MG/DL (ref 8–23)
CALCIUM SERPL-MCNC: 7.7 MG/DL (ref 8.7–10.5)
CHLORIDE SERPL-SCNC: 107 MMOL/L (ref 95–110)
CO2 SERPL-SCNC: 26 MMOL/L (ref 23–29)
CREAT SERPL-MCNC: 5.3 MG/DL (ref 0.5–1.4)
E COLI SXT1 STL QL IA: NEGATIVE
E COLI SXT2 STL QL IA: NEGATIVE
GFR SERPLBLD CREATININE-BSD FMLA CKD-EPI: 11 ML/MIN/1.73/M2
GLUCOSE SERPL-MCNC: 105 MG/DL (ref 70–110)
INR PPP: 2.4 (ref 0.8–1.2)
LDH SERPL-CCNC: 290 U/L (ref 110–260)
MAGNESIUM SERPL-MCNC: 1.7 MG/DL (ref 1.6–2.6)
PHOSPHATE SERPL-MCNC: 2 MG/DL (ref 2.7–4.5)
POTASSIUM SERPL-SCNC: 3.2 MMOL/L (ref 3.5–5.1)
PROTHROMBIN TIME: 24.5 SECONDS (ref 9–12.5)
SODIUM SERPL-SCNC: 141 MMOL/L (ref 136–145)

## 2025-05-11 PROCEDURE — 93750 INTERROGATION VAD IN PERSON: CPT | Mod: ,,, | Performed by: INTERNAL MEDICINE

## 2025-05-11 PROCEDURE — 36415 COLL VENOUS BLD VENIPUNCTURE: CPT

## 2025-05-11 PROCEDURE — 84100 ASSAY OF PHOSPHORUS: CPT

## 2025-05-11 PROCEDURE — 25000003 PHARM REV CODE 250: Performed by: STUDENT IN AN ORGANIZED HEALTH CARE EDUCATION/TRAINING PROGRAM

## 2025-05-11 PROCEDURE — 99233 SBSQ HOSP IP/OBS HIGH 50: CPT | Mod: 25,,, | Performed by: INTERNAL MEDICINE

## 2025-05-11 PROCEDURE — 27000248 HC VAD-ADDITIONAL DAY

## 2025-05-11 PROCEDURE — 25000003 PHARM REV CODE 250

## 2025-05-11 PROCEDURE — 83615 LACTATE (LD) (LDH) ENZYME: CPT

## 2025-05-11 PROCEDURE — 85610 PROTHROMBIN TIME: CPT

## 2025-05-11 PROCEDURE — 83735 ASSAY OF MAGNESIUM: CPT

## 2025-05-11 PROCEDURE — 80048 BASIC METABOLIC PNL TOTAL CA: CPT

## 2025-05-11 RX ORDER — HYDRALAZINE HYDROCHLORIDE 50 MG/1
50 TABLET, FILM COATED ORAL EVERY 8 HOURS
Qty: 90 TABLET | Refills: 11 | Status: SHIPPED | OUTPATIENT
Start: 2025-05-11 | End: 2026-05-11

## 2025-05-11 RX ORDER — POTASSIUM CHLORIDE 20 MEQ/1
40 TABLET, EXTENDED RELEASE ORAL ONCE
Status: COMPLETED | OUTPATIENT
Start: 2025-05-11 | End: 2025-05-11

## 2025-05-11 RX ORDER — HYDRALAZINE HYDROCHLORIDE 50 MG/1
50 TABLET, FILM COATED ORAL EVERY 8 HOURS
Status: DISCONTINUED | OUTPATIENT
Start: 2025-05-11 | End: 2025-05-11 | Stop reason: HOSPADM

## 2025-05-11 RX ADMIN — MUPIROCIN: 20 OINTMENT TOPICAL at 09:05

## 2025-05-11 RX ADMIN — POTASSIUM CHLORIDE 40 MEQ: 1500 TABLET, EXTENDED RELEASE ORAL at 09:05

## 2025-05-11 RX ADMIN — HYDRALAZINE HYDROCHLORIDE 25 MG: 25 TABLET ORAL at 05:05

## 2025-05-11 RX ADMIN — VANCOMYCIN HYDROCHLORIDE 125 MG: KIT at 05:05

## 2025-05-11 RX ADMIN — VANCOMYCIN HYDROCHLORIDE 125 MG: KIT at 11:05

## 2025-05-11 RX ADMIN — Medication 1000 UNITS: at 09:05

## 2025-05-11 RX ADMIN — AMIODARONE HYDROCHLORIDE 400 MG: 200 TABLET ORAL at 09:05

## 2025-05-11 RX ADMIN — MEGESTROL ACETATE 40 MG: 40 TABLET ORAL at 09:05

## 2025-05-11 RX ADMIN — VENLAFAXINE 37.5 MG: 37.5 TABLET ORAL at 09:05

## 2025-05-11 NOTE — DISCHARGE SUMMARY
[facility] - Cardiac Intensive Care  Heart Transplant  Discharge Summary      Patient Name: Radha Abbott  MRN: [id number]  Admission Date: 5/9/2025  Hospital Length of Stay: 2 days  Discharge Date and Time: 05/11/2025 1:01 PM  Attending Physician: Walter Mcintyre MD   Discharging Provider: Lilli Mckenzie MD  Primary Care Provider: Vasu Kong MD     HPI: 65 y/o AAM w/ PMHx of stage D HFrEF 2/2 ICM s/p HM3 as DT (12/1/2023), ESRD on HD (MWF), and debility who presented to the emergency department due to anemia.  Patient reports that he has been having about 2 weeks of fatigue and on and off diarrhea, reports that his diarrhea has not been black or bloody previously but reports potentially some darker stools more recently, however reports that it is mostly a watery consistency so he is not able to firmly characterize the diarrhea.  Patient is on to the emergency department by the LVAD coordinators due to concern for anemia with hemoglobin of 6.8.  Patient's baseline hemoglobin is 9-10.  Patient had 2 low-flow alarms on Sunday 05/04/2025, but reports no repeat alarms since then.  Patient was advised by the LVAD coordinators to take Imodium and drink a Gatorade or Powerade to help with hydration during low-flow alarm on Sunday.    Patient reports he had does not have any abdominal pain, but does have poor appetite, decreased p.o. intake, ongoing diarrhea.  No nausea or vomiting.  Denies any hematemesis or hemoptysis.  He denies any hematuria.    Patient just had dialysis day, he follows a Monday Wednesday Friday schedule.  They pulled 2 L of fluid during dialysis today.    He was recently admitted and discharged on 04/07/2025 for weakness and syncope.  Patient was treated with IV fluids at that time, had an echo completed an HD session in and then was discharged home.  In the emergency department, patients vitals were stable, hemoglobin was 6.8 with a baseline hemoglobin of 9 to 10, hypokalemic with potassium  of 3.2, and creatinine elevated in setting of ESRD status post dialysis with 2 L of fluid removal today.  INR was 2.2.  Patient received 1 unit of blood in the ED. no other infectious symptoms besides diarrhea.  CT scan pending.    * No surgery found *     Hospital Course: 63 y/o AAM w/ PMHx of stage D HFrEF 2/2 ICM s/p HM3 as DT (12/1/2023), ESRD on HD (MWF), and debility who presented to the emergency department due to anemia and diarrhea. Anemia found to be consistent with AOCD 2/2 ESRD/dialysis dependence and no melena/hematochezia observed. Pt received one unit of blood in the ED and hemoglobin remained stable thereafter.  On LVAD interrogation, patient was found to have low-flow alarms prior to admission, two on 05/04/2025 and 2 on 05/07/2025.  Patient contacted LVAD coordinators of instructed him to take and Gatorade/fluids due to diarrhea after alarms on 05/04.  Patient did not wake up/did was not aware of alarms on 05/07.  Unclear if patients alarms or secondary to low volume due to diarrhea versus hypertension given patient's MAP was in the 90s to 100s consistently on admission.  No further alarms during admission.  Patient was given 1 dose of hydralazine 10 mg at night of admission due to elevated MAPS.  We initially started him on hydralazine 25 mg t.i.d. and maps improved to high 80s to low 90s, increased hydralazine to 50 mg t.i.d. on discharge.  Curb sided infectious disease since this is patient's 2nd C diff infection. Extended vancomycin course prescribed with taper per their recommendations. Bowel movements firming up and decreased frequency after staring po vancomycin. Pt discharged in stable condition with instructions/counseling on cdiff management at home (isolated bathroom for pt only, using bleach as disinfectant).             Goals of Care Treatment Preferences:  Code Status: Full Code    Health care agent: Pt's wifeArlyn is NOK  Health care agent number: No value filed.                    Consults (From admission, onward)          Status Ordering Provider     Inpatient consult to Gastroenterology  Once        Provider:  (Not yet assigned)    Completed KUSUM TALISHA R     Inpatient consult to Nephrology  Once        Provider:  (Not yet assigned)    Completed KUSUM TALISHA R     Inpatient consult to Registered Dietitian/Nutritionist  Once        Provider:  (Not yet assigned)    Completed PEDRO NOEAL R            Significant Diagnostic Studies: Labs: BMP:   Recent Labs   Lab 05/09/25  1618 05/10/25  0302 05/11/25  0515    91 105    142 141   K 3.2* 3.0* 3.2*    106 107   CO2 27 27 26   BUN 15 18 25*   CREATININE 3.6* 3.9* 5.3*   CALCIUM 8.0* 7.7* 7.7*   MG 1.7 1.6 1.7   , CMP   Recent Labs   Lab 05/09/25  1618 05/10/25  0302 05/11/25  0515    142 141   K 3.2* 3.0* 3.2*    106 107   CO2 27 27 26    91 105   BUN 15 18 25*   CREATININE 3.6* 3.9* 5.3*   CALCIUM 8.0* 7.7* 7.7*   PROT 6.8  --   --    ALBUMIN 2.4*  --   --    BILITOT 0.4  --   --    ALKPHOS 84  --   --    AST 23  --   --    ALT 15  --   --    ANIONGAP 9 9 8   , CBC   Recent Labs   Lab 05/10/25  0052 05/10/25  0745 05/10/25  2018   WBC 5.10 5.34 5.78   HGB 8.2* 8.1* 7.9*   HCT 27.8* 27.5* 26.7*    205 172   , and INR   Lab Results   Component Value Date    INR 2.4 (H) 05/11/2025    INR 2.4 (H) 05/10/2025    INR 2.2 (H) 05/09/2025    PROTIME 24.5 (H) 05/11/2025    PROTIME 24.4 (H) 05/10/2025    PROTIME 22.6 (H) 05/09/2025     Microbiology: Blood Culture   Lab Results   Component Value Date    LABBLOO No growth after 5 days. 08/29/2024    LABBLOO No growth after 5 days. 08/29/2024   , Sputum Culture   Lab Results   Component Value Date    GSRESP <10 epithelial cells per low power field. 12/11/2023    GSRESP Rare WBC's 12/11/2023    GSRESP Rare Gram positive cocci 12/11/2023    RESPIRATORYC (A) 01/04/2024     PSEUDOMONAS AERUGINOSA   Many  Normal respiratory bobby also present      , and cdiff positive     Pending Diagnostic Studies:       Procedure Component Value Units Date/Time    CBC auto differential [[id number]]     Order Status: Sent Lab Status: No result     Specimen: Blood     Narrative:      The following orders were created for panel order CBC auto differential.  Procedure                               Abnormality         Status                     ---------                               -----------         ------                     CBC with Differential[[id number]]                                                        Please view results for these tests on the individual orders.    CBC with Differential [[id number]]     Order Status: Sent Lab Status: No result     Specimen: Blood     Stool Exam-Ova,Cysts,Parasites [[id number]] Collected: 05/10/25 0431    Order Status: Sent Lab Status: In process Updated: 05/10/25 0453    Specimen: Stool           Final Active Diagnoses:    Diagnosis Date Noted POA    PRINCIPAL PROBLEM:  Clostridium difficile diarrhea [A04.72] 05/11/2025 Unknown    Anemia [D64.9] 05/09/2025 Unknown    LVAD (left ventricular assist device) present [Z95.811] 12/01/2023 Not Applicable    Type 2 diabetes mellitus without complication, without long-term current use of insulin [E11.9] 10/07/2023 Yes    ESRD (end stage renal disease) [N18.6] 01/22/2024 Yes    Heart failure [I50.9] 12/09/2023 Yes    Diarrhea [R19.7] 08/02/2024 Yes    PAF (paroxysmal atrial fibrillation) [I48.0] 10/11/2023 Yes    CAD (coronary artery disease) [I25.10] 10/07/2023 Yes      Problems Resolved During this Admission:      Discharged Condition: stable    Disposition: Home or Self Care    Follow Up:    Patient Instructions:      ACCEPT - Ambulatory referral/consult to [facility]   Standing Status: Future   Referral Priority: Routine Referral Type: Consultation   Referral Reason: Specialty Services Required   Requested Specialty: Cardiology   Number of Visits  Requested: 1     Ambulatory Referral/Consult to Physical Therapy   Referral Priority: Routine Referral Type: Physical Therapy   Referral Reason: Specialty Services Required   Number of Visits Requested: 1     Ambulatory Referral/Consult to Occupational Therapy   Referral Priority: Routine Referral Type: Occupational Therapy   Referral Reason: Specialty Services Required   Requested Specialty: Occupational Therapy   Number of Visits Requested: 1     Medications:  Reconciled Home Medications:      Medication List        PAUSE taking these medications      sevelamer carbonate 800 mg Tab  Wait to take this until: May 13, 2025  Pending discussion with nephrology at your next dialysis session   Commonly known as: RENVELA  Take 800 mg by mouth 3 (three) times daily with meals.            START taking these medications      hydrALAZINE 50 MG tablet  Commonly known as: APRESOLINE  Take 1 tablet (50 mg total) by mouth every 8 (eight) hours.     vancomycin 25 mg/mL Solr  Commonly known as: FIRVANQ  Take 5 mLs (125 mg total) by mouth every 6 (six) hours for 9 days, THEN 5 mLs (125 mg total) 2 (two) times a day for 7 days, THEN 5 mLs (125 mg total) once daily for 7 days, THEN 5 mLs (125 mg total) every other day for 14 days. Discard remaining medication.  Start taking on: May 11, 2025            CONTINUE taking these medications      amiodarone 400 MG tablet  Commonly known as: PACERONE  Take 1 tablet (400 mg total) by mouth once daily.     amitriptyline 25 MG tablet  Commonly known as: ELAVIL  Take 1 tablet (25 mg total) by mouth 3 (three) times a week.     atorvastatin 40 MG tablet  Commonly known as: LIPITOR  Take 1 tablet (40 mg total) by mouth every evening.     Fish OiL 1,000 (120-180) mg Cap  Generic drug: omega 3-dha-epa-fish oil  Take 1 capsule by mouth once daily.     megestroL 40 MG Tab  Commonly known as: MEGACE  Take 40-80 mg by mouth once daily As needed for appetite.     miscellaneous medical supply Kit  by Apply  Externally route 2 (two) times a day. Use twice daily at 8 AM and 3 PM and record the value in MyChart as directed.     pregabalin 100 MG capsule  Commonly known as: LYRICA  Take 1 capsule by mouth every other day.     traZODone 50 MG tablet  Commonly known as: DESYREL  Take 0.5 tablets (25 mg total) by mouth every evening.     venlafaxine 37.5 MG Tab  Commonly known as: EFFEXOR  Take 1 tablet (37.5 mg total) by mouth once daily.     vitamin D 1000 units Tab  Commonly known as: VITAMIN D3  Take 1 tablet by mouth once daily     warfarin 2.5 MG tablet  Commonly known as: COUMADIN  Take 1.25 mg orally daily              Lilli Mckenzie MD  Heart Transplant  [facility] - Cardiac Int[facility]

## 2025-05-11 NOTE — PLAN OF CARE
"Chart reviewed.    [facility] requesting "OK" from ID for discharge.   66y/o M with HFrEF s/p HM3, ESRD on HD who presented with diarrhea, found to have anemia (Hgb 6.8) and c diff.     Non fulminant Cdiff  First recurrence. Initial episode (8/2/2024) s/p po vanc x 10 d.  -Agree with oral vancomycin.   -OK to discharge from ID standpoint once pt shows adequate response to therapy, such as decreasing frequency of BMs, improved consistency of stools, etc.  -Recommend tapered and pulsed regimen  po vanc for 2nd episode:   125 mg orally 4 times daily for 10 days, then  125 mg orally twice daily for 7 days, then  125 mg orally once daily for 7 days, then  125 mg orally every 2  for 2 weeks    Ana Cavazos MD  Infectious Disease       "

## 2025-05-11 NOTE — HOSPITAL COURSE
65 y/o AAM w/ PMHx of stage D HFrEF 2/2 ICM s/p HM3 as DT (12/1/2023), ESRD on HD (MWF), and debility who presented to the emergency department due to anemia and diarrhea. Anemia found to be consistent with AOCD 2/2 ESRD/dialysis dependence and no melena/hematochezia observed. Pt received one unit of blood in the ED and hemoglobin remained stable thereafter.  On LVAD interrogation, patient was found to have low-flow alarms prior to admission, two on 05/04/2025 and 2 on 05/07/2025.  Patient contacted LVAD coordinators of instructed him to take and Gatorade/fluids due to diarrhea after alarms on 05/04.  Patient did not wake up/did was not aware of alarms on 05/07.  Unclear if patients alarms or secondary to low volume due to diarrhea versus hypertension given patient's MAP was in the 90s to 100s consistently on admission.  No further alarms during admission.  Patient was given 1 dose of hydralazine 10 mg at night of admission due to elevated MAPS.  We initially started him on hydralazine 25 mg t.i.d. and maps improved to high 80s to low 90s, increased hydralazine to 50 mg t.i.d. on discharge.  Curb sided infectious disease since this is patient's 2nd C diff infection. Extended vancomycin course prescribed with taper per their recommendations. Bowel movements firming up and decreased frequency after staring po vancomycin. Pt discharged in stable condition with instructions/counseling on cdiff management at home (isolated bathroom for pt only, using bleach as disinfectant).

## 2025-05-11 NOTE — PLAN OF CARE
Patient is ready for discharge. Patient stable alert and oriented. IVs removed. No complaints of pain. Discussed discharge plan. Reviewed medications and side effects, appointments, and answered questions with patient and family. Spouse will  medications from [facility].

## 2025-05-11 NOTE — PROGRESS NOTES
Admit/Discharge Note      Met with patient and spouse  by phone to assess patients needs due to Pt's isolation requirements. Patient is a 65 y.o.  male admitted for Anemia, C. Difficile and low flow alarms. Pt received LVAD in 2023. Pt's spouse does pt's dressing changes.    Patient admitted on 5/9/2025.  At this time, patient and wife presents as alert and oriented x 4, pleasant, recall good, concentration/judgement good, calm, communicative, cooperative, and asking and answering questions appropriately.      Household/Family Systems (as reported by patients caregiver)     Patient resides with his spouse. Support system includes pt's spouse and sister Teetee. Pt has 4 adult children from a previous relationship that resides in [place], [place] (20 mins away from pt). Pt's spouse also has an adult son from a previous relationship that resides in [place], [place]. Patient does not have dependents that are need of being cared for.    Pt's home address: [address] [place] [address].                                  [place] [address]    Pt's phone number: [phone]     Patients primary caregiver is spouse. Pt's spouse is employed full-time at [employer] as a Pt Access Rep. Confirmed patients emergency contacts information as follow:     Arlyn Abbott, spouse, [phone]/M or [phone]/W  Teeete Zhou, sister, [phone] (retired and assist pt while spouse is at work)    During admission, patient's caregiver plans to stay in patient's room. Confirmed patient and patients caregivers do have access to reliable transportation.    Cognitive Status/Learning     Patients caregiver reports patients reading ability as 12th grade and states patient does not have difficulty with reading, writing, seeing, hearing, comprehension, learning, and memory. Pt wears bifocal eyeglasses. Patients caregiver reports patient learns best by multiple methods (audio, visual, and hands-on).   Needed: No.   Highest education  level: High School (9-12) or GED    Vocation/Disability (as reported by patients caregiver)    Working for Income: No  If no, reason not working: Disability  Pt is disabled due to HF since . Prior to disability, pt was a self-employed .    Adherence     Patients caregiver reports patient has a high level of adherence to his health care regimen. Adherence counseling and education provided. Patient's caregiver verbalizes understanding.    Substance Use    Patients caregiver reports patients substance usage as the following:    Tobacco: Pt's spouse denies current use. Pt quit smoking cigarettes in .  Alcohol: Pt drinks socially.  Illicit Drugs/Non-prescribed Medications: none, patient denies any use.  Patients caregiver states clear understanding of the potential impact of substance use.  Substance abstinence/cessation counseling, education and resources provided and reviewed.     Services Utilizing/ADLS (as reported by patients caregiver)    Infusion Service: Prior to admission, patient utilizing? no Utilized [facility] ([phone], [phone]/F) in the past   Home Health: Prior to admission, patient utilizing? no Utilized [facility] ([phone]) in the past. Pt  going to [facility] on  and . [phone]  DME: Prior to admission, yes Pt has a RW, Rollator, PRW, WC, and BSC at home.  Pulmonary/Cardiac Rehab: Prior to admission, no  Dialysis:  Prior to admission, yes OP HD at [facility] on MWF at 1st shift (6:30 am)  [phone]/F  Transplant Specialty Pharmacy:  Prior to admission, no.    Prior to admission, patients caregiver reports patient was not independent with ADLS and was not driving.  Patients caregiver reports patient is able to care for self at this time.Patients caregiver reports patient indicates a willingness to care for self once medically cleared to do so.    Insurance/Medications    Insured by   Payer/Plan Subscr  Sex  Relation Sub. Ins. ID Effective Group Num   1. MEDICAID - [place] BUCK 1960 Male Self [id number] 1/1/16 LABYHP                                   [address] [address]   2. [facility] 1960 [facility] Self [id number] 10/9/23                                    PO       Primary Insurance (for UNOS reporting): Public Insurance - Medicaid  Secondary Insurance (for UNOS reporting): None    Patients caregiver reports patient is able to obtain and afford medications at this time and at time of discharge.    Living Will/Healthcare Power of     Patients caregiver reports patient has a LW and/or HCPA.   provided education regarding LW and HCPA and the completion of forms.    Coping/Mental Health     Pt's wife states Pt has some depression bc Pt is not able to activities that he once use to. Pt's spouse reports that pt has a h/o depression. Pt states he doesn't feel depressed. Pt and Pt's wife both agree that he is able to manage his mental health concerns well at this time and denies any additional support needed for a therapist or psychiatric provider.     Discharge Planning (as reported by patients caregiver)    At time of discharge, patient plans to return to his home under the care of spouse and sister Teetee. Patients spouse will transport patient. Per rounds today, expected discharge date is today. Patients caretaker verbalizes understanding and is involved in treatment planning and discharge process. Per Dr. Mckenzie, Pt has no medical dischrage needs at this time except for continuation of OP PT, [facility] Social Workers will follow up with continuation of referral tomorrow. LCSW faxed discharge paperwork to [facility] (p:[phone], f:[phone]).    Additional Concerns    Patient's caretaker denies additional needs and/or concerns at this time. Patient is being followed for needs, education, resources, information, emotional support, supportive counseling,  and for supportive and skilled discharge plan of care.  providing ongoing psychosocial support, education, resources and d/c planning as needed.  SW remains available.  remains available. Patient's caregiver verbalizes understanding and agreement with information reviewed,  availability and how to access available resources as needed. Patient denies additional needs and/or concerns at this time. Patient verbalizes understanding and agreement with information reviewed, social work availability, and how to access available resources as needed.

## 2025-05-11 NOTE — PROGRESS NOTES
05/11/2025  Monet Aguiar    Current provider:  Walter Mcintyre MD    Device interrogation:      5/11/2025     7:43 AM 5/11/2025     4:49 AM 5/10/2025    11:32 PM 5/10/2025     8:46 PM 5/10/2025     3:42 PM 5/10/2025    12:18 PM 5/10/2025     8:03 AM   TXP LVAD INTERROGATIONS   Type HeartMate3 HeartMate3 HeartMate3 HeartMate3 HeartMate3 HeartMate3 HeartMate3   Flow 3.8 3.6 4.1 3.4 3 3.2 3   Speed 5000 5000 5000 5000 5000 5000 5000   PI 4.9 5.6 4.4 7 5.3 7.7 8.8   Power ([place]) 3.4 3.4 3.4 3.4 3.4 3.4 3.4   LSL 4600 4600 4600 4600 4600 4600 4600   Pulsatility No Pulse No Pulse No Pulse No Pulse  No Pulse No Pulse          Rounded on [facility] to ensure all mechanical assist device settings (IABP or VAD) were appropriate and all parameters were within limits.  I was able to ensure all back up equipment was present, the staff had no issues, and the Perfusion Department daily rounding was complete.      For implantable VADs: Interrogation of Ventricular assist device was performed with analysis of device parameters and review of device function. I have personally reviewed the interrogation findings and agree with findings as stated.     In emergency, the nursing units have been notified to contact the perfusion department either by:  Calling [phone] from 630am to 4pm Mon thru Fri, utilizing the On-Call Finder functionality of Epic and searching for Perfusion, or by contacting the hospital  from 4pm to 630am and on weekends and asking to speak with the perfusionist on call.    10:23 AM

## 2025-05-11 NOTE — ASSESSMENT & PLAN NOTE
Cdiff pcr positive as of 5/10   -started po vanc - long taper dose per ID given second episode  -Recommend tapered and pulsed regimen  po vanc for 2nd episode:   ·125 mg orally 4 times daily for 10 days, then  ·125 mg orally twice daily for 7 days, then  ·125 mg orally once daily for 7 days, then  ·125 mg orally every 2d  for 2 weeks    Stool frequency decreasing on 5/11 and stools are becoming more solid

## 2025-05-11 NOTE — PROGRESS NOTES
[facility] - Cardiac Intensive Care  Heart Transplant  Progress Note    Patient Name: Radha Abbott  MRN: [id number]  Admission Date: 5/9/2025  Hospital Length of Stay: 2 days  Attending Physician: Walter Mcintyre MD  Primary Care Provider: Vasu Kong MD  Principal Problem:Clostridium difficile diarrhea    Subjective:   Interval History: pt doing well this AM, reports diarrhea frequency decreasing, only had one stool overnight, stool becoming more solid. Stable for discharge. Increased hydralazine to 50mg TID, no further LFA. Unclear if LFA prior to admission were all related to diarrhea vs elevated BP as pts MAPs were consistently 90s-100s prior to hydralazine, now more controlled but remains high 80s-90s after hydral 25 TID.     ID provided recommendations for tapered dose of po vancomycin given this is patient's second episode of cdiff.   Instructed pt and wife to isolate one bathroom to patient so he has his own bathroom at least for the next 10 days, and use bleach to disinfect bathroom on all surfaces.     Continuous Infusions:  Scheduled Meds:   amiodarone  400 mg Oral Daily    amitriptyline  25 mg Oral Once per day on Monday Wednesday Friday    atorvastatin  40 mg Oral QHS    hydrALAZINE  50 mg Oral Q8H    megestroL  40 mg Oral Daily    mupirocin   Nasal BID    pregabalin  100 mg Oral Every other day    traZODone  25 mg Oral QHS    vancomycin  125 mg Oral Q6H    venlafaxine  37.5 mg Oral Daily    vitamin D  1,000 Units Oral Daily    warfarin  1.25 mg Oral Once per day on Sunday Monday Tuesday Wednesday Friday Saturday    [START ON 5/15/2025] warfarin  2.5 mg Oral Every Thursday     PRN Meds:  Current Facility-Administered Medications:     0.9%  NaCl infusion (for blood administration), , Intravenous, Q24H PRN    loperamide, 2 mg, Oral, QID PRN    Review of patient's allergies indicates:  No Known Allergies  Objective:     Vital Signs (Most Recent):  Temp: 98 °F (36.7 °C) (05/11/25 0734)  Pulse: 86  (05/11/25 1103)  Resp: 18 (05/11/25 0734)  BP: (!) 80/0 (05/11/25 0743)  SpO2: 96 % (05/11/25 0734) Vital Signs (24h Range):  Temp:  [97.8 °F (36.6 °C)-98.5 °F (36.9 °C)] 98 °F (36.7 °C)  Pulse:  [75-90] 86  Resp:  [12-18] 18  SpO2:  [95 %-97 %] 96 %  BP: ()/(0-74) 80/0     Patient Vitals for the past 72 hrs (Last 3 readings):   Weight   05/10/25 1456 77.6 kg (171 lb 1.2 oz)   05/09/25 2022 77.6 kg (170 lb 15.8 oz)   05/09/25 1515 77.6 kg (171 lb)     Body mass index is 22.57 kg/m².      Intake/Output Summary (Last 24 hours) at 5/11/2025 1142  Last data filed at 5/10/2025 2119  Gross per 24 hour   Intake 920 ml   Output --   Net 920 ml       Hemodynamic Parameters:       Telemetry: reviewed       Physical Exam  Vitals and nursing note reviewed.   Constitutional:       Appearance: He is normal weight.   HENT:      Head: Normocephalic and atraumatic.      Right Ear: External ear normal.      Left Ear: External ear normal.      Nose: Nose normal.      Mouth/Throat:      Mouth: Mucous membranes are moist.      Pharynx: Oropharynx is clear.   Eyes:      Conjunctiva/sclera: Conjunctivae normal.   Cardiovascular:      Comments: Nl vad hum  Pulmonary:      Effort: Pulmonary effort is normal.      Breath sounds: Normal breath sounds.   Abdominal:      General: Abdomen is flat. Bowel sounds are normal. There is no distension.      Palpations: Abdomen is soft.   Musculoskeletal:      Cervical back: Normal range of motion.      Right lower leg: No edema.      Left lower leg: No edema.   Skin:     General: Skin is warm.   Neurological:      Mental Status: He is alert. Mental status is at baseline.   Psychiatric:         Mood and Affect: Mood normal.         Behavior: Behavior normal.            Significant Labs:  CBC:  Recent Labs   Lab 05/10/25  0052 05/10/25  0745 05/10/25  2018   WBC 5.10 5.34 5.78   RBC 3.33* 3.24* 3.20*   HGB 8.2* 8.1* 7.9*   HCT 27.8* 27.5* 26.7*    205 172   MCV 84 85 83   MCH 24.6* 25.0*  "24.7*   MCHC 29.5* 29.5* 29.6*     BNP:  No results for input(s): "BNP" in the last 168 hours.    Invalid input(s): "BNPTRIAGELBLO"  CMP:  Recent Labs   Lab 05/09/25  1618 05/10/25  0302 05/11/25  0515    91 105   CALCIUM 8.0* 7.7* 7.7*   ALBUMIN 2.4*  --   --    PROT 6.8  --   --     142 141   K 3.2* 3.0* 3.2*   CO2 27 27 26    106 107   BUN 15 18 25*   CREATININE 3.6* 3.9* 5.3*   ALKPHOS 84  --   --    ALT 15  --   --    AST 23  --   --    BILITOT 0.4  --   --       Coagulation:   Recent Labs   Lab 05/09/25  1618 05/10/25  0302 05/11/25  0515   INR 2.2* 2.4* 2.4*     LDH:  Recent Labs   Lab 05/10/25  0302 05/11/25  0515   * 290*     Microbiology:  Microbiology Results (last 7 days)       Procedure Component Value Units Date/Time    C Diff Toxin by PCR [[id number]]  (Abnormal) Collected: 05/10/25 0431    Order Status: Completed Specimen: Stool Updated: 05/10/25 1458     Clostridioides difficile toxin PCR Positive    Clostridium difficile EIA [[id number]]  (Abnormal) Collected: 05/10/25 0431    Order Status: Completed Specimen: Stool Updated: 05/10/25 1413     C. DIFFICILE GDH AG Positive     Clostridioides difficile Toxin A/B Negative    E. coli 0157 antigen [[id number]] Collected: 05/10/25 0431    Order Status: Sent Specimen: Stool Updated: 05/10/25 0454    Stool culture [[id number]] Collected: 05/10/25 0431    Order Status: Sent Specimen: Stool Updated: 05/10/25 0454    Campylobacter species Antigen [[id number]] Collected: 05/10/25 0431    Order Status: Sent Specimen: Stool Updated: 05/10/25 0454    Influenza A & B by Molecular [[id number]]  (Normal) Collected: 05/09/25 1548    Order Status: Completed Specimen: Nasal Swab Updated: 05/09/25 1717     INFLUENZA A MOLECULAR Negative     INFLUENZA B MOLECULAR  Negative            I have reviewed all pertinent labs within the past 24 hours.    Estimated Creatinine Clearance: 15.7 mL/min (A) (based on SCr of 5.3 mg/dL (H)).    Diagnostic " Results:  I have reviewed and interpreted all pertinent imaging results/findings within the past 24 hours.  Assessment and Plan:     65 y/o AAM w/ PMHx of stage D HFrEF 2/2 ICM s/p HM3 as DT (12/1/2023), ESRD on HD (MWF), and debility who presented to the emergency department due to anemia.  Patient reports that he has been having about 2 weeks of fatigue and on and off diarrhea, reports that his diarrhea has not been black or bloody previously but reports potentially some darker stools more recently, however reports that it is mostly a watery consistency so he is not able to firmly characterize the diarrhea.  Patient is on to the emergency department by the LVAD coordinators due to concern for anemia with hemoglobin of 6.8.  Patient's baseline hemoglobin is 9-10.  Patient had 2 low-flow alarms on Sunday 05/04/2025, but reports no repeat alarms since then.  Patient was advised by the LVAD coordinators to take Imodium and drink a Gatorade or Powerade to help with hydration during low-flow alarm on Sunday.    Patient reports he had does not have any abdominal pain, but does have poor appetite, decreased p.o. intake, ongoing diarrhea.  No nausea or vomiting.  Denies any hematemesis or hemoptysis.  He denies any hematuria.    Patient just had dialysis day, he follows a Monday Wednesday Friday schedule.  They pulled 2 L of fluid during dialysis today.    He was recently admitted and discharged on 04/07/2025 for weakness and syncope.  Patient was treated with IV fluids at that time, had an echo completed an HD session in and then was discharged home.  In the emergency department, patients vitals were stable, hemoglobin was 6.8 with a baseline hemoglobin of 9 to 10, hypokalemic with potassium of 3.2, and creatinine elevated in setting of ESRD status post dialysis with 2 L of fluid removal today.  INR was 2.2.  Patient received 1 unit of blood in the ED. no other infectious symptoms besides diarrhea.  CT scan pending.    *  Clostridium difficile diarrhea  Cdiff pcr positive as of 5/10   -started po vanc - long taper dose per ID given second episode  -Recommend tapered and pulsed regimen  po vanc for 2nd episode:   ·125 mg orally 4 times daily for 10 days, then  ·125 mg orally twice daily for 7 days, then  ·125 mg orally once daily for 7 days, then  ·125 mg orally every 2d  for 2 weeks    Stool frequency decreasing on 5/11 and stools are becoming more solid     Anemia  History of anemia of chronic disease, baseline hemoglobin is 9 to 10.  Admitting hemoglobin is 6.8.  S/p 1 unit of blood in the ED on 05/09/2025.  Low-flow alarms x2 on 05/04/2025, short-lived.  Patient given rehydration with improvement and no further low-flow alarms.  Ongoing diarrhea.    -iron studies c/w AOCD/ Anemia 2/2 ESRD   -follow CBC b.i.d.  -cdiff positive. Hgb stable. No melena/hematochezia      LVAD (left ventricular assist device) present  Procedure: Device Interrogation Including analysis of device parameters  Current Settings: Ventricular Assist Device  Review of device function is stable/unstable stable        5/11/2025     7:43 AM 5/11/2025     4:49 AM 5/10/2025    11:32 PM 5/10/2025     8:46 PM 5/10/2025     3:42 PM 5/10/2025    12:18 PM 5/10/2025     8:03 AM   TXP LVAD INTERROGATIONS   Type HeartMate3 HeartMate3 HeartMate3 HeartMate3 HeartMate3 HeartMate3 HeartMate3   Flow 3.8 3.6 4.1 3.4 3 3.2 3   Speed 5000 5000 5000 5000 5000 5000 5000   PI 4.9 5.6 4.4 7 5.3 7.7 8.8   Power ([place]) 3.4 3.4 3.4 3.4 3.4 3.4 3.4   LSL 4600 4600 4600 4600 4600 4600 4600   Pulsatility No Pulse No Pulse No Pulse No Pulse  No Pulse No Pulse     -to low-flow alarms on 5/4, contact LVAD coordinators, encouraged to drink fluids and take Imodium  -ongoing diarrhea for 2 weeks  -INR in therapeutic range, will restart warfarin given low concern for GI bleed  -MAPS elevated in 90s-100s, unclear if LFA from 5/4 and 5/7 are 2/2 htn vs diarrhea with low flow.   -required 10mg  hydralazine overnight 5/9 due to high mapss 90s-100s, initiated hydralazine 25 mg TID on 5/10. Increased to 50mg TID on 5/11 on d/c given MAPs high 80s-90s still on hydralazine 25 mg dose. Unclear if LFA 2/2 diarrhea vs uncontrolled BP at home.       Type 2 diabetes mellitus without complication, without long-term current use of insulin  -A1c  -corrective sliding scale    ESRD (end stage renal disease)  Patient gets HD on Monday Wednesday Friday, s/p hemodialysis session with pulling 2 L today 05/09/2025 on day of admission.  -consult to Nephrology for management of dialysis, appreciate recommendations    Heart failure  Patient euvolemic on exam, lower extremity pitting edema, however albumin very low on admission at 2.4.  Patient on HD, but does still make urine.  We will hold off on diuresis at this time.    Diarrhea  Cdiff pcr positive as of 5/10   -started po vanc - long taper dose per ID given second episode  -Recommend tapered and pulsed regimen  po vanc for 2nd episode:   ·125 mg orally 4 times daily for 10 days, then  ·125 mg orally twice daily for 7 days, then  ·125 mg orally once daily for 7 days, then  ·125 mg orally every 2d  for 2 weeks    PAF (paroxysmal atrial fibrillation)  Warfarin held on admission in the setting of possible GI bleed on admit, restart warfarin 5/10        Lilli Mckenzie MD  Heart Transplant  [facility] - Cardiac [facility]

## 2025-05-11 NOTE — ASSESSMENT & PLAN NOTE
Cdiff pcr positive as of 5/10   -started po vanc - long taper dose per ID given second episode  -Recommend tapered and pulsed regimen  po vanc for 2nd episode:   ·125 mg orally 4 times daily for 10 days, then  ·125 mg orally twice daily for 7 days, then  ·125 mg orally once daily for 7 days, then  ·125 mg orally every 2d  for 2 weeks

## 2025-05-11 NOTE — PLAN OF CARE
[facility]   Heart Transplant/VAD Clinic   [address] [place] [address]   [phone] after hours          [phone] fax     VAD HOME  HEALTH ORDERS      Admit to Home Health    Diagnosis: Problem List[1]    Patient is homebound due to:  NYHA Class IV HF. S/P LVAD placement.     Diet: Low Fat, Low cholesterol, 2Gm Na, Coumadin restrictions.    Acitivities: No Swimming, bathing, vacuuming, contact sports.    Fresh implants= Sternal Precautions    Nursing:   SN to complete comprehensive assessment including routine vital signs. Instruct on disease process and s/s of complications to report to MD. Review/verify medication list sent home with the patient at time of discharge  and instruct patient/caregiver as needed. Frequency may be adjusted depending on start of care date.    **LVAD driveline exit site dressing change is to be completed per LVAD patient/caregiver only**.    Notify MD if:  SBP > 120 or < 80;   MAP > 90 or < 70;   HR > 120 or < 60;   Temp > 101;   Weight gain >3lbs in 1 day or 5lbs in 1 week.    LABS:  SN to perform labs: PT/INR per Coumadin clinic [phone].   Weekly bmp, mag, cbc.     Follow up INR date: Per coumadin clinic  No Finger Sticks        CONSULTS:      Physical Therapy to evaluate and treat. Evaluate for home safety and equipment needs; Establish/upgrade home exercise program. Perform / instruct on therapeutic exercises, gait training, transfer training, and Range of Motion.    Occupational Therapy to evaluate and treat. Evaluate home environment for safety and equipment needs. Perform/Instruct on transfers, ADL training, ROM, and therapeutic exercises.     to evaluate for community resources/long-range planning.     Aide to provide assistance with personal care, ADLs, and vital signs    Send initial Home Health orders to HTS attending physician on call.  Send follow up questions to VAD clinic MD [phone] or  [phone].         [1]   Patient Active Problem List  Diagnosis    CAD (coronary artery disease)    Type 2 diabetes mellitus without complication, without long-term current use of insulin    PAF (paroxysmal atrial fibrillation)    Acute on chronic combined systolic and diastolic CHF, NYHA class 4    Defibrillator discharge    Palliative care encounter    Advance care planning    LVAD (left ventricular assist device) present    Heart failure    Depressed mood    Moderate protein-calorie malnutrition    Dysphonia    Unilateral complete vocal fold paralysis    Adjustment disorder with depressed mood    Deep tissue injury    Blood blister    Lymphadenopathy    At high risk for skin breakdown    Parotid mass    Impaired mobility    ESRD (end stage renal disease)    At risk for amiodarone toxicity with long term use    Stage 4 chronic kidney disease    Headache    Hypertension    Generalized weakness    Hyponatremia    Acute combined systolic and diastolic congestive heart failure    Anemia of chronic disease    Weakness of foot, right    Anticoagulant long-term use    Encounter for removal of tunneled central venous catheter (CVC) with port    Bacteremia due to Enterococcus    Renal failure syndrome    Chronic combined systolic and diastolic CHF, NYHA class 4    Chronic kidney disease-mineral and bone disorder    Dialysis patient    Diarrhea    Clostridium difficile infection    Elevated troponin    History of warfarin therapy    Contusion of scalp    Fall    Left flank pain    Falls    Positive occult stool blood test    Decreased strength of upper extremity    Decreased coordination    Anemia    Clostridium difficile diarrhea

## 2025-05-11 NOTE — SUBJECTIVE & OBJECTIVE
Interval History: pt doing well this AM, reports diarrhea frequency decreasing, only had one stool overnight, stool becoming more solid. Stable for discharge. Increased hydralazine to 50mg TID, no further LFA. Unclear if LFA prior to admission were all related to diarrhea vs elevated BP as pts MAPs were consistently 90s-100s prior to hydralazine, now more controlled but remains high 80s-90s after hydral 25 TID.     ID provided recommendations for tapered dose of po vancomycin given this is patient's second episode of cdiff.   Instructed pt and wife to isolate one bathroom to patient so he has his own bathroom at least for the next 10 days, and use bleach to disinfect bathroom on all surfaces.     Continuous Infusions:  Scheduled Meds:   amiodarone  400 mg Oral Daily    amitriptyline  25 mg Oral Once per day on Monday Wednesday Friday    atorvastatin  40 mg Oral QHS    hydrALAZINE  50 mg Oral Q8H    megestroL  40 mg Oral Daily    mupirocin   Nasal BID    pregabalin  100 mg Oral Every other day    traZODone  25 mg Oral QHS    vancomycin  125 mg Oral Q6H    venlafaxine  37.5 mg Oral Daily    vitamin D  1,000 Units Oral Daily    warfarin  1.25 mg Oral Once per day on Sunday Monday Tuesday Wednesday Friday Saturday    [START ON 5/15/2025] warfarin  2.5 mg Oral Every Thursday     PRN Meds:  Current Facility-Administered Medications:     0.9%  NaCl infusion (for blood administration), , Intravenous, Q24H PRN    loperamide, 2 mg, Oral, QID PRN    Review of patient's allergies indicates:  No Known Allergies  Objective:     Vital Signs (Most Recent):  Temp: 98 °F (36.7 °C) (05/11/25 0734)  Pulse: 86 (05/11/25 1103)  Resp: 18 (05/11/25 0734)  BP: (!) 80/0 (05/11/25 0743)  SpO2: 96 % (05/11/25 0734) Vital Signs (24h Range):  Temp:  [97.8 °F (36.6 °C)-98.5 °F (36.9 °C)] 98 °F (36.7 °C)  Pulse:  [75-90] 86  Resp:  [12-18] 18  SpO2:  [95 %-97 %] 96 %  BP: ()/(0-74) 80/0     Patient Vitals for the past 72 hrs (Last 3  "readings):   Weight   05/10/25 1456 77.6 kg (171 lb 1.2 oz)   05/09/25 2022 77.6 kg (170 lb 15.8 oz)   05/09/25 1515 77.6 kg (171 lb)     Body mass index is 22.57 kg/m².      Intake/Output Summary (Last 24 hours) at 5/11/2025 1142  Last data filed at 5/10/2025 2119  Gross per 24 hour   Intake 920 ml   Output --   Net 920 ml       Hemodynamic Parameters:       Telemetry: reviewed       Physical Exam  Vitals and nursing note reviewed.   Constitutional:       Appearance: He is normal weight.   HENT:      Head: Normocephalic and atraumatic.      Right Ear: External ear normal.      Left Ear: External ear normal.      Nose: Nose normal.      Mouth/Throat:      Mouth: Mucous membranes are moist.      Pharynx: Oropharynx is clear.   Eyes:      Conjunctiva/sclera: Conjunctivae normal.   Cardiovascular:      Comments: Nl vad hum  Pulmonary:      Effort: Pulmonary effort is normal.      Breath sounds: Normal breath sounds.   Abdominal:      General: Abdomen is flat. Bowel sounds are normal. There is no distension.      Palpations: Abdomen is soft.   Musculoskeletal:      Cervical back: Normal range of motion.      Right lower leg: No edema.      Left lower leg: No edema.   Skin:     General: Skin is warm.   Neurological:      Mental Status: He is alert. Mental status is at baseline.   Psychiatric:         Mood and Affect: Mood normal.         Behavior: Behavior normal.            Significant Labs:  CBC:  Recent Labs   Lab 05/10/25  0052 05/10/25  0745 05/10/25  2018   WBC 5.10 5.34 5.78   RBC 3.33* 3.24* 3.20*   HGB 8.2* 8.1* 7.9*   HCT 27.8* 27.5* 26.7*    205 172   MCV 84 85 83   MCH 24.6* 25.0* 24.7*   MCHC 29.5* 29.5* 29.6*     BNP:  No results for input(s): "BNP" in the last 168 hours.    Invalid input(s): "BNPTRIAGELBLO"  CMP:  Recent Labs   Lab 05/09/25  1618 05/10/25  0302 05/11/25  0515    91 105   CALCIUM 8.0* 7.7* 7.7*   ALBUMIN 2.4*  --   --    PROT 6.8  --   --     142 141   K 3.2* 3.0* 3.2* "   CO2 27 27 26    106 107   BUN 15 18 25*   CREATININE 3.6* 3.9* 5.3*   ALKPHOS 84  --   --    ALT 15  --   --    AST 23  --   --    BILITOT 0.4  --   --       Coagulation:   Recent Labs   Lab 05/09/25  1618 05/10/25  0302 05/11/25  0515   INR 2.2* 2.4* 2.4*     LDH:  Recent Labs   Lab 05/10/25  0302 05/11/25  0515   * 290*     Microbiology:  Microbiology Results (last 7 days)       Procedure Component Value Units Date/Time    C Diff Toxin by PCR [[id number]]  (Abnormal) Collected: 05/10/25 0431    Order Status: Completed Specimen: Stool Updated: 05/10/25 1458     Clostridioides difficile toxin PCR Positive    Clostridium difficile EIA [[id number]]  (Abnormal) Collected: 05/10/25 0431    Order Status: Completed Specimen: Stool Updated: 05/10/25 1413     C. DIFFICILE GDH AG Positive     Clostridioides difficile Toxin A/B Negative    E. coli 0157 antigen [[id number]] Collected: 05/10/25 0431    Order Status: Sent Specimen: Stool Updated: 05/10/25 0454    Stool culture [[id number]] Collected: 05/10/25 0431    Order Status: Sent Specimen: Stool Updated: 05/10/25 0454    Campylobacter species Antigen [[id number]] Collected: 05/10/25 0431    Order Status: Sent Specimen: Stool Updated: 05/10/25 0454    Influenza A & B by Molecular [[id number]]  (Normal) Collected: 05/09/25 1548    Order Status: Completed Specimen: Nasal Swab Updated: 05/09/25 1717     INFLUENZA A MOLECULAR Negative     INFLUENZA B MOLECULAR  Negative            I have reviewed all pertinent labs within the past 24 hours.    Estimated Creatinine Clearance: 15.7 mL/min (A) (based on SCr of 5.3 mg/dL (H)).    Diagnostic Results:  I have reviewed and interpreted all pertinent imaging results/findings within the past 24 hours.

## 2025-05-11 NOTE — PLAN OF CARE
Plan of care discussed with patient. Patient ambulating x1 assist, fall precautions in place. LVAD DP and numbers WNL, smooth LVAD hum. Patient has no complaints of pain. Discussed medications and care. 1 loose BM during shift. Patient has no questions at this time. Will continue to monitor.     Problem: Ventricular Assist Device  Goal: Optimal Adjustment to Device  Outcome: Progressing  Goal: Absence of Bleeding  Outcome: Progressing  Goal: Absence of Embolism Signs and Symptoms  Outcome: Progressing  Goal: Optimal Blood Flow  Outcome: Progressing  Goal: Absence of Infection Signs and Symptoms  Outcome: Progressing  Goal: Effective Right-Sided Heart Function  Outcome: Progressing     Problem: Adult Inpatient Plan of Care  Goal: Plan of Care Review  Outcome: Progressing  Goal: Patient-Specific Goal (Individualized)  Outcome: Progressing  Goal: Absence of Hospital-Acquired Illness or Injury  Outcome: Progressing  Goal: Optimal Comfort and Wellbeing  Outcome: Progressing  Goal: Readiness for Transition of Care  Outcome: Progressing     Problem: Diabetes Comorbidity  Goal: Blood Glucose Level Within Targeted Range  Outcome: Progressing     Problem: Infection  Goal: Absence of Infection Signs and Symptoms  Outcome: Progressing     Problem: Fall Injury Risk  Goal: Absence of Fall and Fall-Related Injury  Outcome: Progressing     Problem: Skin Injury Risk Increased  Goal: Skin Health and Integrity  Outcome: Progressing     Problem: Hemodialysis  Goal: Safe, Effective Therapy Delivery  Outcome: Progressing  Goal: Effective Tissue Perfusion  Outcome: Progressing  Goal: Absence of Infection Signs and Symptoms  Outcome: Progressing

## 2025-05-11 NOTE — ASSESSMENT & PLAN NOTE
History of anemia of chronic disease, baseline hemoglobin is 9 to 10.  Admitting hemoglobin is 6.8.  S/p 1 unit of blood in the ED on 05/09/2025.  Low-flow alarms x2 on 05/04/2025, short-lived.  Patient given rehydration with improvement and no further low-flow alarms.  Ongoing diarrhea.    -iron studies c/w AOCD/ Anemia 2/2 ESRD   -follow CBC b.i.d.  -cdiff positive. Hgb stable. No melena/hematochezia

## 2025-05-12 ENCOUNTER — TELEPHONE (OUTPATIENT)
Dept: TRANSPLANT | Facility: CLINIC | Age: 65
End: 2025-05-12
Payer: MEDICAID

## 2025-05-12 ENCOUNTER — ANTI-COAG VISIT (OUTPATIENT)
Dept: CARDIOLOGY | Facility: CLINIC | Age: 65
End: 2025-05-12
Payer: MEDICAID

## 2025-05-12 ENCOUNTER — HOSPITAL ENCOUNTER (EMERGENCY)
Facility: HOSPITAL | Age: 65
Discharge: HOME | End: 2025-05-12
Attending: EMERGENCY MEDICINE
Payer: MEDICAID

## 2025-05-12 VITALS
BODY MASS INDEX: 22.16 KG/M2 | TEMPERATURE: 98 F | WEIGHT: 168 LBS | SYSTOLIC BLOOD PRESSURE: 110 MMHG | RESPIRATION RATE: 20 BRPM | OXYGEN SATURATION: 99 % | DIASTOLIC BLOOD PRESSURE: 72 MMHG | HEART RATE: 75 BPM

## 2025-05-12 DIAGNOSIS — Z95.811 LVAD (LEFT VENTRICULAR ASSIST DEVICE) PRESENT: ICD-10-CM

## 2025-05-12 DIAGNOSIS — Z99.2 ESRD (END STAGE RENAL DISEASE) ON DIALYSIS: ICD-10-CM

## 2025-05-12 DIAGNOSIS — N30.00 ACUTE CYSTITIS WITHOUT HEMATURIA: Primary | ICD-10-CM

## 2025-05-12 DIAGNOSIS — Z86.19 HISTORY OF CLOSTRIDIOIDES DIFFICILE INFECTION: ICD-10-CM

## 2025-05-12 DIAGNOSIS — Z95.811 LVAD (LEFT VENTRICULAR ASSIST DEVICE) PRESENT: Primary | ICD-10-CM

## 2025-05-12 DIAGNOSIS — D64.9 CHRONIC ANEMIA: ICD-10-CM

## 2025-05-12 DIAGNOSIS — Z13.6 SCREENING FOR CARDIOVASCULAR CONDITION: ICD-10-CM

## 2025-05-12 DIAGNOSIS — N18.6 ESRD (END STAGE RENAL DISEASE) ON DIALYSIS: ICD-10-CM

## 2025-05-12 LAB
ABSOLUTE EOSINOPHIL (OHS): 0.17 K/UL
ABSOLUTE MONOCYTE (OHS): 0.33 K/UL (ref 0.3–1)
ABSOLUTE NEUTROPHIL COUNT (OHS): 5.35 K/UL (ref 1.8–7.7)
ALBUMIN SERPL BCP-MCNC: 2.7 G/DL (ref 3.5–5.2)
ALP SERPL-CCNC: 98 UNIT/L (ref 40–150)
ALT SERPL W/O P-5'-P-CCNC: 24 UNIT/L (ref 10–44)
ANION GAP (OHS): 10 MMOL/L (ref 8–16)
APTT PPP: 39 SECONDS (ref 21–32)
AST SERPL-CCNC: 23 UNIT/L (ref 11–45)
BACTERIA #/AREA URNS AUTO: ABNORMAL /HPF
BACTERIA STL CULT: NORMAL
BASOPHILS # BLD AUTO: 0.03 K/UL
BASOPHILS NFR BLD AUTO: 0.5 %
BILIRUB SERPL-MCNC: 0.6 MG/DL (ref 0.1–1)
BILIRUB UR QL STRIP.AUTO: NEGATIVE
BNP SERPL-MCNC: 570 PG/ML (ref 0–99)
BUN SERPL-MCNC: 16 MG/DL (ref 8–23)
C COLI+JEJ+UPSA DNA STL QL NAA+NON-PROBE: POSITIVE
CALCIUM SERPL-MCNC: 8.4 MG/DL (ref 8.7–10.5)
CHLORIDE SERPL-SCNC: 102 MMOL/L (ref 95–110)
CLARITY UR: ABNORMAL
CO2 SERPL-SCNC: 26 MMOL/L (ref 23–29)
COLOR UR AUTO: YELLOW
CREAT SERPL-MCNC: 3.8 MG/DL (ref 0.5–1.4)
ERYTHROCYTE [DISTWIDTH] IN BLOOD BY AUTOMATED COUNT: 18.8 % (ref 11.5–14.5)
GFR SERPLBLD CREATININE-BSD FMLA CKD-EPI: 17 ML/MIN/1.73/M2
GLUCOSE SERPL-MCNC: 120 MG/DL (ref 70–110)
GLUCOSE UR QL STRIP: NEGATIVE
HCT VFR BLD AUTO: 32 % (ref 40–54)
HGB BLD-MCNC: 9.8 GM/DL (ref 14–18)
HGB UR QL STRIP: ABNORMAL
HOLD SPECIMEN: NORMAL
HYALINE CASTS UR QL AUTO: 0 /LPF (ref 0–1)
IMM GRANULOCYTES # BLD AUTO: 0.04 K/UL (ref 0–0.04)
IMM GRANULOCYTES NFR BLD AUTO: 0.6 % (ref 0–0.5)
INDIRECT COOMBS: NORMAL
INR PPP: 2 (ref 0.8–1.2)
KETONES UR QL STRIP: NEGATIVE
LACTATE SERPL-SCNC: 1.7 MMOL/L (ref 0.5–2.2)
LACTATE SERPL-SCNC: 2 MMOL/L (ref 0.5–2.2)
LEUKOCYTE ESTERASE UR QL STRIP: ABNORMAL
LYMPHOCYTES # BLD AUTO: 0.55 K/UL (ref 1–4.8)
MAGNESIUM SERPL-MCNC: 1.8 MG/DL (ref 1.6–2.6)
MCH RBC QN AUTO: 25.1 PG (ref 27–31)
MCHC RBC AUTO-ENTMCNC: 30.6 G/DL (ref 32–36)
MCV RBC AUTO: 82 FL (ref 82–98)
MICROSCOPIC COMMENT: ABNORMAL
NITRITE UR QL STRIP: NEGATIVE
NUCLEATED RBC (/100WBC) (OHS): 1 /100 WBC
OHS QRS DURATION: 160 MS
OHS QTC CALCULATION: 561 MS
PH UR STRIP: 7 [PH]
PLATELET # BLD AUTO: 226 K/UL (ref 150–450)
PMV BLD AUTO: 9.7 FL (ref 9.2–12.9)
POTASSIUM SERPL-SCNC: 3.6 MMOL/L (ref 3.5–5.1)
PROCALCITONIN SERPL-MCNC: 1.02 NG/ML
PROT SERPL-MCNC: 7.8 GM/DL (ref 6–8.4)
PROT UR QL STRIP: ABNORMAL
PROTHROMBIN TIME: 21.3 SECONDS (ref 9–12.5)
RBC # BLD AUTO: 3.91 M/UL (ref 4.6–6.2)
RBC #/AREA URNS AUTO: 11 /HPF (ref 0–4)
RELATIVE EOSINOPHIL (OHS): 2.6 %
RELATIVE LYMPHOCYTE (OHS): 8.5 % (ref 18–48)
RELATIVE MONOCYTE (OHS): 5.1 % (ref 4–15)
RELATIVE NEUTROPHIL (OHS): 82.7 % (ref 38–73)
RH BLD: NORMAL
SODIUM SERPL-SCNC: 138 MMOL/L (ref 136–145)
SP GR UR STRIP: 1.01
SPECIMEN OUTDATE: NORMAL
SQUAMOUS #/AREA URNS AUTO: 4 /HPF
TROPONIN I SERPL DL<=0.01 NG/ML-MCNC: 0.04 NG/ML
UROBILINOGEN UR STRIP-ACNC: NEGATIVE EU/DL
WBC # BLD AUTO: 6.47 K/UL (ref 3.9–12.7)
WBC #/AREA URNS AUTO: >100 /HPF (ref 0–5)
WBC CLUMPS UR QL AUTO: ABNORMAL

## 2025-05-12 PROCEDURE — 85610 PROTHROMBIN TIME: CPT | Performed by: EMERGENCY MEDICINE

## 2025-05-12 PROCEDURE — 99285 EMERGENCY DEPT VISIT HI MDM: CPT | Mod: 25

## 2025-05-12 PROCEDURE — 86900 BLOOD TYPING SEROLOGIC ABO: CPT | Performed by: EMERGENCY MEDICINE

## 2025-05-12 PROCEDURE — 93010 ELECTROCARDIOGRAM REPORT: CPT | Mod: ,,, | Performed by: INTERNAL MEDICINE

## 2025-05-12 PROCEDURE — 93005 ELECTROCARDIOGRAM TRACING: CPT

## 2025-05-12 PROCEDURE — 81003 URINALYSIS AUTO W/O SCOPE: CPT | Performed by: EMERGENCY MEDICINE

## 2025-05-12 PROCEDURE — 25000003 PHARM REV CODE 250: Performed by: EMERGENCY MEDICINE

## 2025-05-12 PROCEDURE — 87186 SC STD MICRODIL/AGAR DIL: CPT | Performed by: EMERGENCY MEDICINE

## 2025-05-12 PROCEDURE — 82040 ASSAY OF SERUM ALBUMIN: CPT | Performed by: EMERGENCY MEDICINE

## 2025-05-12 PROCEDURE — 85025 COMPLETE CBC W/AUTO DIFF WBC: CPT | Performed by: EMERGENCY MEDICINE

## 2025-05-12 PROCEDURE — 83735 ASSAY OF MAGNESIUM: CPT | Performed by: EMERGENCY MEDICINE

## 2025-05-12 PROCEDURE — 63600175 PHARM REV CODE 636 W HCPCS: Performed by: EMERGENCY MEDICINE

## 2025-05-12 PROCEDURE — 84145 PROCALCITONIN (PCT): CPT | Performed by: EMERGENCY MEDICINE

## 2025-05-12 PROCEDURE — 83605 ASSAY OF LACTIC ACID: CPT | Performed by: EMERGENCY MEDICINE

## 2025-05-12 PROCEDURE — 96365 THER/PROPH/DIAG IV INF INIT: CPT

## 2025-05-12 PROCEDURE — 85730 THROMBOPLASTIN TIME PARTIAL: CPT | Performed by: EMERGENCY MEDICINE

## 2025-05-12 PROCEDURE — 87040 BLOOD CULTURE FOR BACTERIA: CPT | Performed by: EMERGENCY MEDICINE

## 2025-05-12 PROCEDURE — 83880 ASSAY OF NATRIURETIC PEPTIDE: CPT | Performed by: EMERGENCY MEDICINE

## 2025-05-12 PROCEDURE — 84484 ASSAY OF TROPONIN QUANT: CPT | Performed by: EMERGENCY MEDICINE

## 2025-05-12 RX ORDER — GRANULES FOR ORAL 3 G/1
3 POWDER ORAL ONCE
Qty: 3 G | Refills: 0 | Status: SHIPPED | OUTPATIENT
Start: 2025-05-12 | End: 2025-05-12

## 2025-05-12 RX ADMIN — VANCOMYCIN HYDROCHLORIDE 125 MG: KIT at 04:05

## 2025-05-12 RX ADMIN — GENTAMICIN SULFATE 381.2 MG: 40 INJECTION, SOLUTION INTRAMUSCULAR; INTRAVENOUS at 03:05

## 2025-05-12 NOTE — PROGRESS NOTES
Admitted 5/9-5/11. Hospital course: presented to the emergency department due to anemia and diarrhea. Anemia found to be consistent with AOCD 2/2 ESRD/dialysis dependence and no melena/hematochezia observed. Pt received one unit of blood in the ED and hemoglobin remained stable thereafter.  On LVAD interrogation, patient was found to have low-flow alarms prior to admission, two on 05/04/2025 and 2 on 05/07/2025.  Patient contacted LVAD coordinators of instructed him to take and Gatorade/fluids due to diarrhea after alarms on 05/04.  Patient did not wake up/did was not aware of alarms on 05/07.  Unclear if patients alarms or secondary to low volume due to diarrhea versus hypertension given patient's MAP was in the 90s to 100s consistently on admission.  No further alarms during admission.  Patient was given 1 dose of hydralazine 10 mg at night of admission due to elevated MAPS.  We initially started him on hydralazine 25 mg t.i.d. and maps improved to high 80s to low 90s, increased hydralazine to 50 mg t.i.d. on discharge.  Curb sided infectious disease since this is patient's 2nd C diff infection. Extended vancomycin course prescribed with taper per their recommendations. Bowel movements firming up and decreased frequency after staring po vancomycin. Pt discharged in stable condition with instructions/counseling on cdiff management at home (isolated bathroom for pt only, using bleach as disinfectant).

## 2025-05-12 NOTE — ED PROVIDER NOTES
SCRIBE #1 NOTE: I, Rebeca Beverly, am scribing for, and in the presence of, Tamara Jensen MD. I have scribed the entire note.       History     Chief Complaint   Patient presents with    Fatigue     Hx of ESRD, LVAD. Pt was in dialysis and his LVAD alarmed. Reports feeling weak and dizzy, denies chest pain or shortness of breath.      Review of patient's allergies indicates:  No Known Allergies      History of Present Illness     HPI    5/12/2025, 11:04 AM  History obtained from the patient and medical records      History of Present Illness: Radha Abbott is a 65 y.o. male patient with a PMHx of aspiration pneumonia, CAD, CHF, delirium, DM, PFrEF, hypoglycemia, ICD, LVAD, MI, PAF, stage 4 CKD, and T2DM who presents to the Emergency Department for evaluation of three LVAD alarms going off during dialysis this morning around 8:30.  Pt was discharged from the cardiac intensive care unit yesterday evening and reports feeling very weak since that time. He has been unable to ambulate without assistance. Pt reports that the diarrhea he has been experiencing is now becoming more firm. Patient denies any vomiting, CP, SOB, or hematochezia. Pt reports being anticoagulated with Coumadin. He is currently going to PT on Tuesdays and Thursdays. He denies receiving a GI capsule while hospitalized. No prior Tx specified.  No further complaints or concerns at this time.       Arrival mode: Personal Transportation    PCP: Vasu Kong MD        Past Medical History:  Past Medical History:   Diagnosis Date    Aspiration pneumonia of both lungs 06/17/2024    CAD (coronary artery disease)     CHF (congestive heart failure)     Delirium 06/16/2024    Diabetes mellitus     HFrEF (heart failure with reduced ejection fraction)     Hypoglycemia 06/12/2024    ICD (implantable cardioverter-defibrillator) in place     LVAD (left ventricular assist device) present 12/01/2023    MI, old     PAF (paroxysmal atrial fibrillation)  10/11/2023    Stage 4 chronic kidney disease 02/15/2024    Type 2 diabetes mellitus without complication, without long-term current use of insulin 10/07/2023       Past Surgical History:  Past Surgical History:   Procedure Laterality Date    ANGIOPLASTY-VENOUS ARTERY Right 12/1/2023    Procedure: ANGIOPLASTY-VENOUS ARTERY, RIGHT FEMORAL;  Surgeon: Yuri Washington MD;  [facility];  Service: Cardiovascular;  Laterality: Right;    AORTIC VALVULOPLASTY N/A 12/1/2023    Procedure: REPAIR, AORTIC VALVE;  Surgeon: Yuri Washington MD;  Location: [facility] OR [facility] FLR;  Service: Cardiovascular;  Laterality: N/A;    CARDIAC SURGERY      CLOSURE N/A 12/1/2023    Procedure: CLOSURE, TEMPORARY;  Surgeon: Yuri Washington MD;  Location: [facility] OR [facility] FLR;  Service: Cardiovascular;  Laterality: N/A;    DRAINAGE OF PLEURAL EFFUSION  12/4/2023    Procedure: DRAINAGE, PLEURAL EFFUSION;  Surgeon: Yuir Washington MD;  [facility];  Service: Cardiovascular;;    INSERTION OF GRAFT TO PERICARDIUM  12/4/2023    Procedure: INSERTION, GRAFT, PERICARDIUM;  Surgeon: Yuri Washington MD;  Location: [facility];  Service: Cardiovascular;;    INSERTION OF INTRA-AORTIC BALLOON ASSIST DEVICE Right 11/21/2023    Procedure: INSERTION, INTRA-AORTIC BALLOON PUMP;  Surgeon: Finn Cohn MD;  Location: [facility] CATH LAB;  Service: Cardiology;  Laterality: Right;    LEFT VENTRICULAR ASSIST DEVICE Left 12/1/2023    Procedure: INSERTION-LEFT VENTRICULAR ASSIST DEVICE;  Surgeon: Yuri Washington MD;  Location: [facility];  Service: Cardiovascular;  Laterality: Left;  REDO STERNOTOMY - REDO SAW NEEDED FOR CASE    LYSIS OF ADHESIONS  12/1/2023    Procedure: LYSIS, ADHESIONS;  Surgeon: Yuri Washington MD;  Location: [facility] OR [facility] FLR;  Service: Cardiovascular;;    PLACEMENT OF SWAN ROLANDO CATHETER WITH IMAGING GUIDANCE  11/20/2023    Procedure: INSERTION, CATHETER, SWAN-ROLANDO, WITH IMAGING GUIDANCE;  Surgeon: Sajan Hurley  MD;  Location: [facility] CATH LAB;  Service: Cardiology;;    REMOVAL OF TUNNELED CENTRAL VENOUS CATHETER (CVC) N/A 3/1/2024    Procedure: REMOVAL, CATHETER, CENTRAL VENOUS, TUNNELED;  Surgeon: Seble Aguilar MD;  Location: [facility] CATH LAB;  Service: Interventional Nephrology;  Laterality: N/A;    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 12/1/2023    Procedure: REPAIR, ANEURYSM, ARTERY, FEMORAL;  Surgeon: Yuri Washington MD;  Location: [facility] OR [facility] FLR;  Service: Cardiovascular;  Laterality: Right;  Right Femoral Artery Repair    RIGHT HEART CATHETERIZATION Right 10/10/2023    Procedure: INSERTION, CATHETER, RIGHT HEART;  Surgeon: Bin Gandhi MD;  Location: [facility] CATH LAB;  Service: Cardiology;  Laterality: Right;    RIGHT HEART CATHETERIZATION Right 10/13/2023    Procedure: INSERTION, CATHETER, RIGHT HEART;  Surgeon: Walter Mcintyre MD;  Location: [facility] CATH LAB;  Service: Cardiology;  Laterality: Right;    RIGHT HEART CATHETERIZATION  11/13/2023    RIGHT HEART CATHETERIZATION Right 11/13/2023    Procedure: INSERTION, CATHETER, RIGHT HEART;  Surgeon: Juventino Bermudez Jr., MD;  Location: [facility] CATH LAB;  Service: Cardiology;  Laterality: Right;    RIGHT HEART CATHETERIZATION Right 11/20/2023    Procedure: INSERTION, CATHETER, RIGHT HEART;  Surgeon: Sajan Hurley MD;  Location: [facility] CATH LAB;  Service: Cardiology;  Laterality: Right;    RIGHT HEART CATHETERIZATION Right 1/22/2024    Procedure: INSERTION, CATHETER, RIGHT HEART;  Surgeon: Brayan Ocampo MD;  Location: [facility] CATH LAB;  Service: Cardiology;  Laterality: Right;    STERNAL WOUND CLOSURE N/A 12/4/2023    Procedure: CLOSURE, WOUND, STERNUM;  Surgeon: Yuri Washington MD;  Location: [facility] OR [facility] FLR;  Service: Cardiovascular;  Laterality: N/A;    STERNOTOMY N/A 12/1/2023    Procedure: STERNOTOMY, REDO;  Surgeon: Yuri Washington MD;  Location: [facility] OR 2ND FLR;  Service: Cardiovascular;  Laterality: N/A;    VALVULOPLASTY, MITRAL VALVE N/A 12/1/2023     Procedure: VALVULOPLASTY, MITRAL VALVE;  Surgeon: Yuri Washington MD;  Location: [facility] OR [address] [place] [address];  Service: Cardiovascular;  Laterality: N/A;         Family History:  No family history on file.    Social History:  Social History     Tobacco Use    Smoking status: Former     Current packs/day: 0.50     Types: Cigarettes    Smokeless tobacco: Not on file   Substance and Sexual Activity    Alcohol use: Yes     Comment: rarely    Drug use: No    Sexual activity: Not Currently     Partners: Female        Review of Systems     Review of Systems   Constitutional:  Negative for fever.   HENT:  Negative for sore throat.    Respiratory:  Negative for shortness of breath.    Cardiovascular:  Negative for chest pain.   Gastrointestinal:  Positive for diarrhea. Negative for blood in stool, nausea and vomiting.   Genitourinary:  Negative for dysuria.   Musculoskeletal:  Negative for back pain.   Skin:  Negative for rash.   Neurological:  Positive for weakness.   Hematological:  Does not bruise/bleed easily.   All other systems reviewed and are negative.     Physical Exam     Initial Vitals   BP Pulse Resp Temp SpO2   05/12/25 1119 05/12/25 1036 05/12/25 1036 05/12/25 1036 05/12/25 1242   110/72 80 20 98.1 °F (36.7 °C) 97 %      MAP       --                 Physical Exam  Nursing Notes and Vital Signs Reviewed.  Constitutional: Patient is in no acute distress. Well-developed and well-nourished. Looks well.   Head: Atraumatic. Normocephalic.  Eyes: PERRL. EOM intact. Conjunctivae are not pale. No scleral icterus.  ENT: Mucous membranes are moist. Oropharynx is clear and symmetric.    Neck: Supple. Full ROM. No lymphadenopathy.  Cardiovascular: LVAD in place, ventricul. Distal pulses are 2+ and symmetric.  Pulmonary/Chest: Vas Cath to right upper chest wall. No respiratory distress. Clear to auscultation bilaterally. No wheezing or rales.  Abdominal: Soft and non-distended.  There is no tenderness.  No rebound, guarding, or  rigidity. Good bowel sounds.  Genitourinary: No CVA tenderness.  Musculoskeletal: Moves all extremities. No obvious deformities. No edema. No calf tenderness.  Skin: Warm and dry.  Neurological:  GCS 15. Alert, awake, and appropriate.  Normal speech.  No acute focal neurological deficits are appreciated.  Psychiatric: Normal affect. Good eye contact. Appropriate in content.     ED Course   Critical Care    Date/Time: 5/12/2025 3:30 PM    Performed by: Tamara Jensen MD  Authorized by: Tamara Jensen MD  Direct patient critical care time: 40 minutes  Additional history critical care time: 8 minutes  Ordering / reviewing critical care time: 7 minutes  Documentation critical care time: 7 minutes  Consulting other physicians critical care time: 8 minutes  Total critical care time (exclusive of procedural time) : 70 minutes  Critical care was necessary to treat or prevent imminent or life-threatening deterioration of the following conditions: renal failure, sepsis, dehydration and cardiac failure.  Critical care was time spent personally by me on the following activities: blood draw for specimens, development of treatment plan with patient or surrogate, discussions with consultants, interpretation of cardiac output measurements, evaluation of patient's response to treatment, examination of patient, obtaining history from patient or surrogate, ordering and performing treatments and interventions, ordering and review of laboratory studies, pulse oximetry, re-evaluation of patient's condition, ordering and review of radiographic studies and review of old charts.        ED Vital Signs:  Vitals:    05/12/25 1036 05/12/25 1118 05/12/25 1119 05/12/25 1242   BP:   110/72    Pulse: 80 80 82 80   Resp: 20  20 16   Temp: 98.1 °F (36.7 °C)      TempSrc: Oral      SpO2:    97%   Weight: 76.2 kg (168 lb)       05/12/25 1358 05/12/25 1445 05/12/25 1515 05/12/25 1545   BP: 92/72 93/68 116/74 109/75   Pulse: 81 80 79 81   Resp: 14  19 20 20   Temp:       TempSrc:       SpO2:   97% 100%   Weight:        05/12/25 1630   BP: 110/72   Pulse: 75   Resp: 20   Temp: 97.8 °F (36.6 °C)   TempSrc:    SpO2: 99%   Weight:        Abnormal Lab Results:  Labs Reviewed   CULTURE, URINE - Abnormal       Result Value    Urine Culture 10,000 - 49,999 cfu/ml Pseudomonas aeruginosa (*)    COMPREHENSIVE METABOLIC PANEL - Abnormal    Sodium 138      Potassium 3.6      Chloride 102      CO2 26      Glucose 120 (*)     BUN 16      Creatinine 3.8 (*)     Calcium 8.4 (*)     Protein Total 7.8      Albumin 2.7 (*)     Bilirubin Total 0.6      ALP 98      AST 23      ALT 24      Anion Gap 10      eGFR 17 (*)    URINALYSIS, REFLEX TO URINE CULTURE - Abnormal    Color, UA Yellow      Appearance, UA Cloudy (*)     pH, UA 7.0      Spec Grav UA 1.015      Protein, UA 2+ (*)     Glucose, UA Negative      Ketones, UA Negative      Bilirubin, UA Negative      Blood, UA 2+ (*)     Nitrites, UA Negative      Urobilinogen, UA Negative      Leukocyte Esterase, UA 3+ (*)    PROTIME-INR - Abnormal    PT 21.3 (*)     INR 2.0 (*)    APTT - Abnormal    PTT 39.0 (*)    B-TYPE NATRIURETIC PEPTIDE - Abnormal     (*)    PROCALCITONIN - Abnormal    Procalcitonin 1.02 (*)    TROPONIN I - Abnormal    Troponin-I 0.043 (*)    CBC WITH DIFFERENTIAL - Abnormal    WBC 6.47      RBC 3.91 (*)     HGB 9.8 (*)     HCT 32.0 (*)     MCV 82      MCH 25.1 (*)     MCHC 30.6 (*)     RDW 18.8 (*)     Platelet Count 226      MPV 9.7      Nucleated RBC 1 (*)     Neut % 82.7 (*)     Lymph % 8.5 (*)     Mono % 5.1      Eos % 2.6      Basophil % 0.5      Imm Grans % 0.6 (*)     Neut # 5.35      Lymph # 0.55 (*)     Mono # 0.33      Eos # 0.17      Baso # 0.03      Imm Grans # 0.04     URINALYSIS MICROSCOPIC - Abnormal    RBC, UA 11 (*)     WBC, UA >100 (*)     WBC Clumps, UA Many (*)     Bacteria, UA None      Squamous Epithelial Cells, UA 4      Hyaline Casts, UA 0      Microscopic Comment       CULTURE,  BLOOD - Normal    Blood Culture No Growth After 5 Days     CULTURE, BLOOD - Normal    Blood Culture No Growth After 5 Days     LACTIC ACID, PLASMA - Normal    Lactic Acid Level 2.0      Narrative:     Falsely low lactic acid results can be found in samples containing >=13.0 mg/dL total bilirubin and/or >=3.5 mg/dL direct bilirubin.    LACTIC ACID, PLASMA - Normal    Lactic Acid Level 1.7      Narrative:     Falsely low lactic acid results can be found in samples containing >=13.0 mg/dL total bilirubin and/or >=3.5 mg/dL direct bilirubin.    MAGNESIUM - Normal    Magnesium  1.8     CBC W/ AUTO DIFFERENTIAL    Narrative:     The following orders were created for panel order CBC auto differential.  Procedure                               Abnormality         Status                     ---------                               -----------         ------                     CBC with Differential[[id number]]       Abnormal            Final result                 Please view results for these tests on the individual orders.   GREY TOP URINE HOLD    Extra Tube Hold for add-ons.     TYPE & SCREEN    Specimen Outdate 05/15/2025 23:59      Group & Rh A POS      Indirect Jimenez NEG          All Lab Results:  Results for orders placed or performed during the hospital encounter of 05/12/25   EKG 12-lead    Collection Time: 05/12/25 11:02 AM   Result Value Ref Range    QRS Duration 160 ms    [facility] QTC Calculation 561 ms   Comprehensive metabolic panel    Collection Time: 05/12/25 12:47 PM   Result Value Ref Range    Sodium 138 136 - 145 mmol/L    Potassium 3.6 3.5 - 5.1 mmol/L    Chloride 102 95 - 110 mmol/L    CO2 26 23 - 29 mmol/L    Glucose 120 (H) 70 - 110 mg/dL    BUN 16 8 - 23 mg/dL    Creatinine 3.8 (H) 0.5 - 1.4 mg/dL    Calcium 8.4 (L) 8.7 - 10.5 mg/dL    Protein Total 7.8 6.0 - 8.4 gm/dL    Albumin 2.7 (L) 3.5 - 5.2 g/dL    Bilirubin Total 0.6 0.1 - 1.0 mg/dL    ALP 98 40 - 150 unit/L    AST 23 11 - 45 unit/L    ALT 24 10 -  44 unit/L    Anion Gap 10 8 - 16 mmol/L    eGFR 17 (L) >60 mL/min/1.73/m2   Lactic acid, plasma #1    Collection Time: 05/12/25 12:47 PM   Result Value Ref Range    Lactic Acid Level 2.0 0.5 - 2.2 mmol/L   Magnesium    Collection Time: 05/12/25 12:47 PM   Result Value Ref Range    Magnesium  1.8 1.6 - 2.6 mg/dL   Protime-INR    Collection Time: 05/12/25 12:47 PM   Result Value Ref Range    PT 21.3 (H) 9.0 - 12.5 seconds    INR 2.0 (H) 0.8 - 1.2   APTT    Collection Time: 05/12/25 12:47 PM   Result Value Ref Range    PTT 39.0 (H) 21.0 - 32.0 seconds   Brain natriuretic peptide    Collection Time: 05/12/25 12:47 PM   Result Value Ref Range     (H) 0 - 99 pg/mL   Procalcitonin    Collection Time: 05/12/25 12:47 PM   Result Value Ref Range    Procalcitonin 1.02 (H) <0.25 ng/mL   Troponin I    Collection Time: 05/12/25 12:47 PM   Result Value Ref Range    Troponin-I 0.043 (H) <=0.026 ng/mL   CBC with Differential    Collection Time: 05/12/25 12:47 PM   Result Value Ref Range    WBC 6.47 3.90 - 12.70 K/uL    RBC 3.91 (L) 4.60 - 6.20 M/uL    HGB 9.8 (L) 14.0 - 18.0 gm/dL    HCT 32.0 (L) 40.0 - 54.0 %    MCV 82 82 - 98 fL    MCH 25.1 (L) 27.0 - 31.0 pg    MCHC 30.6 (L) 32.0 - 36.0 g/dL    RDW 18.8 (H) 11.5 - 14.5 %    Platelet Count 226 150 - 450 K/uL    MPV 9.7 9.2 - 12.9 fL    Nucleated RBC 1 (H) <=0 /100 WBC    Neut % 82.7 (H) 38 - 73 %    Lymph % 8.5 (L) 18 - 48 %    Mono % 5.1 4 - 15 %    Eos % 2.6 <=8 %    Basophil % 0.5 <=1.9 %    Imm Grans % 0.6 (H) 0.0 - 0.5 %    Neut # 5.35 1.8 - 7.7 K/uL    Lymph # 0.55 (L) 1 - 4.8 K/uL    Mono # 0.33 0.3 - 1 K/uL    Eos # 0.17 <=0.5 K/uL    Baso # 0.03 <=0.2 K/uL    Imm Grans # 0.04 0.00 - 0.04 K/uL   Type & Screen    Collection Time: 05/12/25 12:47 PM   Result Value Ref Range    Specimen Outdate 05/15/2025 23:59     Group & Rh A POS     Indirect Jimenez NEG    Blood culture x two cultures. Draw prior to antibiotics.    Collection Time: 05/12/25 12:50 PM    Specimen:  Peripheral, Antecubital, Left; Blood   Result Value Ref Range    Blood Culture No Growth After 5 Days    Blood culture x two cultures. Draw prior to antibiotics.    Collection Time: 05/12/25 12:51 PM    Specimen: Peripheral, Hand, Left; Blood   Result Value Ref Range    Blood Culture No Growth After 5 Days    Urine culture    Collection Time: 05/12/25  1:18 PM    Specimen: Urine   Result Value Ref Range    Urine Culture 10,000 - 49,999 cfu/ml Pseudomonas aeruginosa (A)        Susceptibility    Pseudomonas aeruginosa - MARY GRACE     Cefepime <=2 Sensitive µg/ml     Ciprofloxacin <=0.25 Sensitive µg/ml     Levofloxacin <=0.5 Sensitive µg/ml     Meropenem <=1 Sensitive µg/ml     Piperacillin/Tazobactam <=8 Sensitive µg/ml    Pseudomonas aeruginosa - E-TEST     Amikacin Etest <=16 Sensitive      Tobramycin <=1 Sensitive    Urinalysis, Reflex to Urine Culture Urine, Clean Catch    Collection Time: 05/12/25  1:18 PM    Specimen: Urine   Result Value Ref Range    Color, UA Yellow Straw, Brenda, Yellow, Light-Orange    Appearance, UA Cloudy (A) Clear    pH, UA 7.0 5.0 - 8.0    Spec Grav UA 1.015 1.005 - 1.030    Protein, UA 2+ (A) Negative    Glucose, UA Negative Negative    Ketones, UA Negative Negative    Bilirubin, UA Negative Negative    Blood, UA 2+ (A) Negative    Nitrites, UA Negative Negative    Urobilinogen, UA Negative <2.0 EU/dL    Leukocyte Esterase, UA 3+ (A) Negative   GREY TOP URINE HOLD    Collection Time: 05/12/25  1:18 PM   Result Value Ref Range    Extra Tube Hold for add-ons.    Urinalysis Microscopic    Collection Time: 05/12/25  1:18 PM   Result Value Ref Range    RBC, UA 11 (H) 0 - 4 /HPF    WBC, UA >100 (H) 0 - 5 /HPF    WBC Clumps, UA Many (A) None, Rare    Bacteria, UA None None, Rare, Occasional /HPF    Squamous Epithelial Cells, UA 4 /HPF    Hyaline Casts, UA 0 0 - 1 /LPF    Microscopic Comment     Lactic acid, plasma #2    Collection Time: 05/12/25  3:27 PM   Result Value Ref Range    Lactic Acid Level  1.7 0.5 - 2.2 mmol/L       Imaging Results:  Imaging Results              X-Ray Chest AP Portable (Final result)  Result time 05/12/25 11:30:49      Final result by Gino Adair MD (05/12/25 11:30:49)                   Impression:      1.  Overall, no significant interval change has occurred.      Electronically signed by: Gino Adair MD  Date:    05/12/2025  Time:    11:30               Narrative:    EXAMINATION:  XR CHEST AP PORTABLE    CLINICAL HISTORY:  Sepsis;    COMPARISON:  May 9, 2025    FINDINGS:  Stable single lead left subclavian ICD device, median sternotomy wires, right jugular vascular catheter and LVAD device in place.  Stable left pleural effusion with areas of scarring or atelectasis in the mid lower left lung.  The lungs are free of new pulmonary opacities.  The hilar and mediastinal contours and osseous structures are unchanged.                                       The EKG was ordered, reviewed, and independently interpreted by the ED provider.  Interpretation time: 11:02  Rate: 83 BPM  Rhythm: Wide QRS rhythm  Interpretation: Left axis deviation. Nonspecific intraventricular block. Minimal voltage criteria for LVH, may be normal variant (Francois product). No STEMI.           The Emergency Provider reviewed the vital signs and test results, which are outlined above.     ED Discussion     1:58 PM: Discussed pt's case with Dr. Ferrera (Infectious Diseases) who recommends a dose of Fosfomycin 3 gram once post HD and a dose of Gentamicin. Dr. Ferrera will follow the cultures.    3:28 PM: Reassessed pt at this time. Discussed with patient and/or family/caretaker all pertinent ED information and results. Discussed pt dx and plan of tx. Discussed the pt and wife the treatment plan with infectious disease for the UTI. Gave the patient and spouse all f/u and return to the ED instructions. All questions and concerns were addressed at this time. Patient and/or family/caretaker expresses understanding of  information and instructions, and is comfortable with plan to discharge. Pt is stable for discharge.     I discussed with patient and/or family/caretaker that evaluation in the ED does not suggest any emergent or life threatening medical conditions requiring immediate intervention beyond what was provided in the ED, and I believe patient is safe for discharge.  Regardless, an unremarkable evaluation in the ED does not preclude the development or presence of a serious of life threatening condition. As such, I instructed that the patient is to return immediately for any worsening or change in current symptoms.       Medical Decision Making  DDX: 1. Electrolyte dysfunction 2. Dehydration 3. Symptomatic anemia    ECG unchanged from prior LVAD patient, CXR stable, wbc normal, h/h stable and improved from baseline, kidney function at baseline, lactate normal, procal mildly elevated, UTI noted, currently being treated for C. Diff, infectious disease consulted, Gentamicin given, discussed case further with Dr. Price at [facility] given LVAD patient, discussed patient at length, reviewed all labs, overall decision made that patient was safe for discharge home, wife and patient agree with plan of care, given UTI likely cause of weakness, sent fosfomycin rx to pharmacy.  Reasons to return given.     Amount and/or Complexity of Data Reviewed  Labs: ordered. Decision-making details documented in ED Course.  Radiology: ordered. Decision-making details documented in ED Course.  ECG/medicine tests: ordered and independent interpretation performed. Decision-making details documented in ED Course.  Discussion of management or test interpretation with external provider(s): Reviewed pt's labwork with Dr. Price (LVAD Heart Transplant Physician). Dr. Price states that pt is stable for discharge and is on call if the patient or his wife have any questions or concerns.    Risk  Prescription drug management.  Decision regarding  hospitalization.  Diagnosis or treatment significantly limited by social determinants of health.                ED Medication(s):  Medications   gentamicin (GARAMYCIN) 381.2 mg in 0.9% NaCl 100 mL IVPB (0 mg Intravenous Stopped 5/12/25 1607)   vancomycin 125 mg/5 mL oral solution 125 mg (125 mg Oral Given 5/12/25 1630)       Discharge Medication List as of 5/12/2025  3:27 PM        START taking these medications    Details   fosfomycin (MONUROL) 3 gram Pack Take 3 g by mouth once. for 1 dose, Starting Mon 5/12/2025, Normal              Follow-up Information       Sandhya Price MD. Schedule an appointment as soon as possible for a visit in 3 days.    Specialties: Cardiology, Transplant  Why: Return to the Emergency Room, If symptoms worsen  Contact information:  [address]  [facility] [address]  [phone]                                 Scribe Attestation:   Scribe #1: I performed the above scribed service and the documentation accurately describes the services I performed. I attest to the accuracy of the note.     Attending:   Physician Attestation Statement for Scribe #1: I, Tamara Jensen MD, personally performed the services described in this documentation, as scribed by Rebeca Beverly, in my presence, and it is both accurate and complete.           Clinical Impression       ICD-10-CM ICD-9-CM   1. Acute cystitis without hematuria  N30.00 595.0   2. Screening for cardiovascular condition  Z13.6 V81.2   3. LVAD (left ventricular assist device) present  Z95.811 V43.21   4. ESRD (end stage renal disease) on dialysis  N18.6 585.6    Z99.2 V45.11   5. Chronic anemia  D64.9 285.9   6. History of Clostridioides difficile infection  Z86.19 V12.09       Disposition:   Disposition: Discharged  Condition: Tamara Chatterjee MD  05/18/25 2935

## 2025-05-12 NOTE — ED NOTES
"Interrogated alarms on LVAD "LOW FLOW "alarmed at [id number].Pt was currently in dialysis session when the alarms sounded  "

## 2025-05-12 NOTE — TELEPHONE ENCOUNTER
Received notification from HD nurse, Shadi Pitts, that patient is having low flow alarms, doppler 58/0, feels weak and dizzy. UF already discontinued and patient is continuing to have these symptoms. Advised to discontinue session and patient should report to ER. Verbalized understanding. On call provider, Dr Price, notified.

## 2025-05-12 NOTE — PROGRESS NOTES
Discharge Follow-up    CRISTAL phoned [facility], ph [phone] and confirmed that pt will be seen tomorrow at 10am. No new orders requested. CRISTAL advised OT&W to disregard HH orders faxed yesterday. CRISTAL remains available.

## 2025-05-13 ENCOUNTER — LAB VISIT (OUTPATIENT)
Dept: LAB | Facility: HOSPITAL | Age: 65
End: 2025-05-13
Attending: INTERNAL MEDICINE
Payer: MEDICAID

## 2025-05-13 ENCOUNTER — ANTI-COAG VISIT (OUTPATIENT)
Dept: CARDIOLOGY | Facility: CLINIC | Age: 65
End: 2025-05-13
Payer: MEDICAID

## 2025-05-13 ENCOUNTER — PATIENT MESSAGE (OUTPATIENT)
Dept: TRANSPLANT | Facility: CLINIC | Age: 65
End: 2025-05-13
Payer: MEDICAID

## 2025-05-13 ENCOUNTER — EPISODE CHANGES (OUTPATIENT)
Dept: CARDIOLOGY | Facility: CLINIC | Age: 65
End: 2025-05-13

## 2025-05-13 DIAGNOSIS — Z95.811 LVAD (LEFT VENTRICULAR ASSIST DEVICE) PRESENT: Primary | ICD-10-CM

## 2025-05-13 DIAGNOSIS — Z95.811 PRESENCE OF VENTRICULAR ASSIST DEVICE: ICD-10-CM

## 2025-05-13 LAB
ABO + RH BLD: NORMAL
ABO + RH BLD: NORMAL
BLD PROD TYP BPU: NORMAL
BLD PROD TYP BPU: NORMAL
BLOOD UNIT EXPIRATION DATE: NORMAL
BLOOD UNIT EXPIRATION DATE: NORMAL
BLOOD UNIT TYPE CODE: 6200
BLOOD UNIT TYPE CODE: 6200
CROSSMATCH INTERPRETATION: NORMAL
CROSSMATCH INTERPRETATION: NORMAL
DISPENSE STATUS: NORMAL
DISPENSE STATUS: NORMAL
INR PPP: 1.9 (ref 0.8–1.2)
PROTHROMBIN TIME: 20.7 SECONDS (ref 9–12.5)
UNIT NUMBER: NORMAL
UNIT NUMBER: NORMAL

## 2025-05-13 PROCEDURE — 36415 COLL VENOUS BLD VENIPUNCTURE: CPT

## 2025-05-13 PROCEDURE — 85610 PROTHROMBIN TIME: CPT

## 2025-05-13 PROCEDURE — 93793 ANTICOAG MGMT PT WARFARIN: CPT | Mod: ,,,

## 2025-05-13 RX ORDER — CHOLECALCIFEROL (VITAMIN D3) 25 MCG
1000 TABLET ORAL
Qty: 30 TABLET | Refills: 11 | Status: SHIPPED | OUTPATIENT
Start: 2025-05-13

## 2025-05-13 NOTE — PROGRESS NOTES
[facility]    2025 8:18 AM    Assessment/Plan:    Patient presents today with therapeutic INR.    Assessment of patient findings and chart review: no significant findings     Recommendation for patient's warfarin regimen: Continue current maintenance dose    Recommend repeat INR Thursday  _________________________________________________________________    Radha Abbott (65 y.o.) is followed by the [facility].    Anticoagulation Summary  As of 2025      INR goal:  2.0-3.0   TTR:  69.1% (1 y)   INR used for dosin.0 (2025)   Warfarin maintenance plan:  2.5 mg (2.5 mg x 1) every Thu; 1.25 mg (2.5 mg x 0.5) all other days   Weekly warfarin total:  10 mg   Plan last modified:  Jesenia Jensen, PharmD (2025)   Next INR check:  5/15/2025   Target end date:  --    Indications    LVAD (left ventricular assist device) present [Z95.811]                 Anticoagulation Episode Summary       INR check location:  --    Preferred lab:  --    Send INR reminders to:  [facility] COUMADIN LVAD    Comments:  [place]          Anticoagulation Care Providers       Provider Role Specialty Phone number    Sajan Hurley MD [facility] [phone]

## 2025-05-15 ENCOUNTER — TELEPHONE (OUTPATIENT)
Dept: TRANSPLANT | Facility: CLINIC | Age: 65
End: 2025-05-15
Payer: MEDICAID

## 2025-05-15 NOTE — TELEPHONE ENCOUNTER
Received urine culture and stool sample results in my in-basket. Informed Dr. Price, Dr. Rice and FAMILIA Zavala. Dr. Rice will review and prescribe what is needed.     Called patient's wife, no answer, left VM.

## 2025-05-15 NOTE — PROGRESS NOTES
05/15/2025-Arlyn stated she forgot pt was scheduled for INR today and will take him to the lab 5/16/25 before noon for INR. INR scheduled.

## 2025-05-16 ENCOUNTER — PATIENT MESSAGE (OUTPATIENT)
Dept: INFECTIOUS DISEASES | Facility: HOSPITAL | Age: 65
End: 2025-05-16
Payer: MEDICAID

## 2025-05-16 ENCOUNTER — ANTI-COAG VISIT (OUTPATIENT)
Dept: CARDIOLOGY | Facility: CLINIC | Age: 65
End: 2025-05-16
Payer: MEDICAID

## 2025-05-16 ENCOUNTER — LAB VISIT (OUTPATIENT)
Dept: LAB | Facility: HOSPITAL | Age: 65
End: 2025-05-16
Attending: INTERNAL MEDICINE
Payer: MEDICAID

## 2025-05-16 DIAGNOSIS — Z95.811 LVAD (LEFT VENTRICULAR ASSIST DEVICE) PRESENT: Primary | ICD-10-CM

## 2025-05-16 DIAGNOSIS — Z95.811 PRESENCE OF VENTRICULAR ASSIST DEVICE: ICD-10-CM

## 2025-05-16 LAB
BACTERIA UR CULT: ABNORMAL
INR PPP: 2.1 (ref 0.8–1.2)
PROTHROMBIN TIME: 22.4 SECONDS (ref 9–12.5)

## 2025-05-16 PROCEDURE — 36415 COLL VENOUS BLD VENIPUNCTURE: CPT

## 2025-05-16 PROCEDURE — 85610 PROTHROMBIN TIME: CPT

## 2025-05-16 NOTE — PROGRESS NOTES
[facility]    2025 11:27 AM    Assessment/Plan:    Patient presents today with therapeutic INR.    Assessment of patient findings and chart review: no significant findings     Recommendation for patient's warfarin regimen: Continue current maintenance dose    Recommend repeat INR Tuesday  _________________________________________________________________    Radha Abbott (65 y.o.) is followed by the [facility].    Anticoagulation Summary  As of 2025      INR goal:  2.0-3.0   TTR:  68.8% (1 y)   INR used for dosin.1 (2025)   Warfarin maintenance plan:  2.5 mg (2.5 mg x 1) every Thu; 1.25 mg (2.5 mg x 0.5) all other days   Weekly warfarin total:  10 mg   Plan last modified:  Jesenia Jensen, PharmD (2025)   Next INR check:  2025   Target end date:  --    Indications    LVAD (left ventricular assist device) present [Z95.811]                 Anticoagulation Episode Summary       INR check location:  --    Preferred lab:  --    Send INR reminders to:  [facility] COUMADIN LVAD    Comments:  [place]          Anticoagulation Care Providers       Provider Role Specialty Phone number    Sajan Hurley MD [facility] Card[facility] [phone]

## 2025-05-17 LAB
BACTERIA BLD CULT: NORMAL
BACTERIA BLD CULT: NORMAL

## 2025-05-19 ENCOUNTER — PATIENT MESSAGE (OUTPATIENT)
Dept: TRANSPLANT | Facility: CLINIC | Age: 65
End: 2025-05-19
Payer: MEDICAID

## 2025-05-20 ENCOUNTER — LAB VISIT (OUTPATIENT)
Dept: LAB | Facility: HOSPITAL | Age: 65
End: 2025-05-20
Attending: INTERNAL MEDICINE
Payer: MEDICAID

## 2025-05-20 ENCOUNTER — ANTI-COAG VISIT (OUTPATIENT)
Dept: CARDIOLOGY | Facility: CLINIC | Age: 65
End: 2025-05-20
Payer: MEDICAID

## 2025-05-20 DIAGNOSIS — Z95.811 LVAD (LEFT VENTRICULAR ASSIST DEVICE) PRESENT: Primary | ICD-10-CM

## 2025-05-20 DIAGNOSIS — Z95.811 PRESENCE OF VENTRICULAR ASSIST DEVICE: ICD-10-CM

## 2025-05-20 LAB
INR PPP: 1.6 (ref 0.8–1.2)
PROTHROMBIN TIME: 17.5 SECONDS (ref 9–12.5)

## 2025-05-20 PROCEDURE — 93793 ANTICOAG MGMT PT WARFARIN: CPT | Mod: ,,,

## 2025-05-20 PROCEDURE — 36415 COLL VENOUS BLD VENIPUNCTURE: CPT

## 2025-05-20 PROCEDURE — 85610 PROTHROMBIN TIME: CPT

## 2025-05-20 NOTE — PROGRESS NOTES
[facility]    2025 11:02 AM    Assessment/Plan:    Patient presents today with subtherapeutic  INR.    Assessment of patient findings and chart review: no significant findings     Recommendation for patient's warfarin regimen: Boost dose today to 2.5mg then resume current maintenance dose    Recommend repeat INR Thursday  _________________________________________________________________    [facility] (65 y.o.) is followed by the [facility].    Anticoagulation Summary  As of 2025      INR goal:  2.0-3.0   TTR:  68.3% (1.1 y)   INR used for dosin.6 (2025)   Warfarin maintenance plan:  2.5 mg (2.5 mg x 1) every Thu; 1.25 mg (2.5 mg x 0.5) all other days   Weekly warfarin total:  10 mg   Plan last modified:  Jesenia Jensen, PharmD (2025)   Next INR check:  2025   Target end date:  --    Indications    LVAD (left ventricular assist device) present [Z95.811]                 Anticoagulation Episode Summary       INR check location:  --    Preferred lab:  --    Send INR reminders to:  [facility] COUMADIN LVAD    Comments:  [place]          Anticoagulation Care Providers       Provider Role Specialty Phone number    Sajan Hurley MD [facility] Cardiology [phone]

## 2025-05-20 NOTE — PROGRESS NOTES
Arlyn reports correct Warfarin dose, Vancomycin for the past 2 weeks and will be on it until next month, diarrhea 5/18/25, fatigue, no other changes reported.

## 2025-05-21 ENCOUNTER — PATIENT MESSAGE (OUTPATIENT)
Dept: TRANSPLANT | Facility: CLINIC | Age: 65
End: 2025-05-21
Payer: MEDICAID

## 2025-05-22 ENCOUNTER — ANTI-COAG VISIT (OUTPATIENT)
Dept: CARDIOLOGY | Facility: CLINIC | Age: 65
End: 2025-05-22
Payer: MEDICAID

## 2025-05-22 ENCOUNTER — LAB VISIT (OUTPATIENT)
Dept: LAB | Facility: HOSPITAL | Age: 65
End: 2025-05-22
Attending: INTERNAL MEDICINE
Payer: MEDICAID

## 2025-05-22 DIAGNOSIS — Z95.811 PRESENCE OF VENTRICULAR ASSIST DEVICE: ICD-10-CM

## 2025-05-22 DIAGNOSIS — Z95.811 LVAD (LEFT VENTRICULAR ASSIST DEVICE) PRESENT: Primary | ICD-10-CM

## 2025-05-22 LAB
INR PPP: 2 (ref 0.8–1.2)
PROTHROMBIN TIME: 21.3 SECONDS (ref 9–12.5)

## 2025-05-22 PROCEDURE — 93793 ANTICOAG MGMT PT WARFARIN: CPT | Mod: ,,,

## 2025-05-22 PROCEDURE — 36415 COLL VENOUS BLD VENIPUNCTURE: CPT

## 2025-05-22 PROCEDURE — 85610 PROTHROMBIN TIME: CPT

## 2025-05-22 NOTE — PROGRESS NOTES
[facility]    2025 1:16 PM    Assessment/Plan:    Patient presents today with therapeutic INR.    Assessment of patient findings and chart review: no significant findings     Recommendation for patient's warfarin regimen: Continue current maintenance dose    Recommend repeat INR Monday  _________________________________________________________________    [facility]65 [facility] by the [facility].    Anticoagulation Summary  As of 2025      INR goal:  2.0-3.0   TTR:  67.9% (1.1 y)   INR used for dosin.0 (2025)   Warfarin maintenance plan:  2.5 mg (2.5 mg x 1) every Thu; 1.25 mg (2.5 mg x 0.5) all other days   Weekly warfarin total:  10 mg   Plan last modified:  Jesenia Jensen, PharmD (2025)   Next INR check:  2025   Target end date:  --    Indications    LVAD (left ventricular assist device) present [Z95.811]                 Anticoagulation Episode Summary       INR check location:  --    Preferred lab:  --    Send INR reminders to:  [facility] COUMADIN LVAD    Comments:  [place]          Anticoagulation Care Providers       Provider Role Specialty Phone number    Sajan Hurley MD [facility] Cardiology [phone]

## 2025-05-23 ENCOUNTER — HOSPITAL ENCOUNTER (INPATIENT)
Facility: HOSPITAL | Age: 65
LOS: 3 days | Discharge: HOME | DRG: 391 | End: 2025-05-26
Attending: INTERNAL MEDICINE | Admitting: INTERNAL MEDICINE
Payer: MEDICAID

## 2025-05-23 ENCOUNTER — HOSPITAL ENCOUNTER (EMERGENCY)
Facility: HOSPITAL | Age: 65
Discharge: ANOTHER HOSPITAL | End: 2025-05-23
Attending: EMERGENCY MEDICINE
Payer: MEDICAID

## 2025-05-23 VITALS — TEMPERATURE: 98 F | OXYGEN SATURATION: 100 % | HEART RATE: 79 BPM | RESPIRATION RATE: 18 BRPM

## 2025-05-23 DIAGNOSIS — R78.81 BACTEREMIA DUE TO ENTEROCOCCUS: ICD-10-CM

## 2025-05-23 DIAGNOSIS — R53.1 WEAKNESS: ICD-10-CM

## 2025-05-23 DIAGNOSIS — A04.72 CLOSTRIDIUM DIFFICILE DIARRHEA: Primary | ICD-10-CM

## 2025-05-23 DIAGNOSIS — R19.7 DIARRHEA: ICD-10-CM

## 2025-05-23 DIAGNOSIS — B95.2 BACTEREMIA DUE TO ENTEROCOCCUS: ICD-10-CM

## 2025-05-23 DIAGNOSIS — R19.7 DIARRHEA OF PRESUMED INFECTIOUS ORIGIN: ICD-10-CM

## 2025-05-23 DIAGNOSIS — Z95.811 LVAD (LEFT VENTRICULAR ASSIST DEVICE) PRESENT: ICD-10-CM

## 2025-05-23 DIAGNOSIS — N18.6 ESRD (END STAGE RENAL DISEASE): ICD-10-CM

## 2025-05-23 PROBLEM — D63.1 ANEMIA DUE TO CHRONIC KIDNEY DISEASE, ON CHRONIC DIALYSIS: Status: RESOLVED | Noted: 2024-01-22 | Resolved: 2025-05-23

## 2025-05-23 PROBLEM — Z99.2 ANEMIA DUE TO CHRONIC KIDNEY DISEASE, ON CHRONIC DIALYSIS: Status: RESOLVED | Noted: 2024-01-22 | Resolved: 2025-05-23

## 2025-05-23 LAB
ABSOLUTE EOSINOPHIL (OHS): 0.16 K/UL
ABSOLUTE MONOCYTE (OHS): 0.39 K/UL (ref 0.3–1)
ABSOLUTE NEUTROPHIL COUNT (OHS): 3.83 K/UL (ref 1.8–7.7)
ALBUMIN SERPL BCP-MCNC: 2.4 G/DL (ref 3.5–5.2)
ALP SERPL-CCNC: 94 UNIT/L (ref 40–150)
ALT SERPL W/O P-5'-P-CCNC: 32 UNIT/L (ref 10–44)
ANION GAP (OHS): 9 MMOL/L (ref 8–16)
APTT PPP: 37.5 SECONDS (ref 21–32)
AST SERPL-CCNC: 31 UNIT/L (ref 11–45)
BASOPHILS # BLD AUTO: 0.03 K/UL
BASOPHILS NFR BLD AUTO: 0.6 %
BILIRUB SERPL-MCNC: 0.5 MG/DL (ref 0.1–1)
BUN SERPL-MCNC: 7 MG/DL (ref 8–23)
C DIFF GDH STL QL: NEGATIVE
C DIFF TOX A+B STL QL IA: NEGATIVE
CALCIUM SERPL-MCNC: 8 MG/DL (ref 8.7–10.5)
CHLORIDE SERPL-SCNC: 103 MMOL/L (ref 95–110)
CO2 SERPL-SCNC: 25 MMOL/L (ref 23–29)
CREAT SERPL-MCNC: 3.4 MG/DL (ref 0.5–1.4)
ERYTHROCYTE [DISTWIDTH] IN BLOOD BY AUTOMATED COUNT: 19.9 % (ref 11.5–14.5)
GFR SERPLBLD CREATININE-BSD FMLA CKD-EPI: 19 ML/MIN/1.73/M2
GLUCOSE SERPL-MCNC: 181 MG/DL (ref 70–110)
HCT VFR BLD AUTO: 28.9 % (ref 40–54)
HGB BLD-MCNC: 8.5 GM/DL (ref 14–18)
IMM GRANULOCYTES # BLD AUTO: 0.02 K/UL (ref 0–0.04)
IMM GRANULOCYTES NFR BLD AUTO: 0.4 % (ref 0–0.5)
INR PPP: 2.5 (ref 0.8–1.2)
LYMPHOCYTES # BLD AUTO: 0.57 K/UL (ref 1–4.8)
MCH RBC QN AUTO: 24.5 PG (ref 27–31)
MCHC RBC AUTO-ENTMCNC: 29.4 G/DL (ref 32–36)
MCV RBC AUTO: 83 FL (ref 82–98)
NUCLEATED RBC (/100WBC) (OHS): 0 /100 WBC
PLATELET # BLD AUTO: 204 K/UL (ref 150–450)
PMV BLD AUTO: 9.7 FL (ref 9.2–12.9)
POTASSIUM SERPL-SCNC: 3.4 MMOL/L (ref 3.5–5.1)
PROT SERPL-MCNC: 7.1 GM/DL (ref 6–8.4)
PROTHROMBIN TIME: 26.7 SECONDS (ref 9–12.5)
RBC # BLD AUTO: 3.47 M/UL (ref 4.6–6.2)
RELATIVE EOSINOPHIL (OHS): 3.2 %
RELATIVE LYMPHOCYTE (OHS): 11.4 % (ref 18–48)
RELATIVE MONOCYTE (OHS): 7.8 % (ref 4–15)
RELATIVE NEUTROPHIL (OHS): 76.6 % (ref 38–73)
SODIUM SERPL-SCNC: 137 MMOL/L (ref 136–145)
WBC # BLD AUTO: 5 K/UL (ref 3.9–12.7)

## 2025-05-23 PROCEDURE — 27000248 HC VAD-ADDITIONAL DAY

## 2025-05-23 PROCEDURE — 85025 COMPLETE CBC W/AUTO DIFF WBC: CPT | Performed by: EMERGENCY MEDICINE

## 2025-05-23 PROCEDURE — 63600175 PHARM REV CODE 636 W HCPCS: Performed by: EMERGENCY MEDICINE

## 2025-05-23 PROCEDURE — 20600001 HC STEP DOWN PRIVATE ROOM

## 2025-05-23 PROCEDURE — 99284 EMERGENCY DEPT VISIT MOD MDM: CPT

## 2025-05-23 PROCEDURE — 87449 NOS EACH ORGANISM AG IA: CPT | Performed by: EMERGENCY MEDICINE

## 2025-05-23 PROCEDURE — 84295 ASSAY OF SERUM SODIUM: CPT | Performed by: EMERGENCY MEDICINE

## 2025-05-23 PROCEDURE — 85610 PROTHROMBIN TIME: CPT | Performed by: EMERGENCY MEDICINE

## 2025-05-23 PROCEDURE — 25000003 PHARM REV CODE 250: Performed by: EMERGENCY MEDICINE

## 2025-05-23 PROCEDURE — 96365 THER/PROPH/DIAG IV INF INIT: CPT

## 2025-05-23 PROCEDURE — 85730 THROMBOPLASTIN TIME PARTIAL: CPT | Performed by: EMERGENCY MEDICINE

## 2025-05-23 RX ORDER — SEVELAMER CARBONATE 800 MG/1
800 TABLET, FILM COATED ORAL
Status: DISCONTINUED | OUTPATIENT
Start: 2025-05-24 | End: 2025-05-24

## 2025-05-23 RX ORDER — AMITRIPTYLINE HYDROCHLORIDE 25 MG/1
25 TABLET, FILM COATED ORAL
Status: DISCONTINUED | OUTPATIENT
Start: 2025-05-24 | End: 2025-05-26 | Stop reason: HOSPADM

## 2025-05-23 RX ORDER — VENLAFAXINE 37.5 MG/1
37.5 TABLET ORAL DAILY
Status: DISCONTINUED | OUTPATIENT
Start: 2025-05-24 | End: 2025-05-26 | Stop reason: HOSPADM

## 2025-05-23 RX ORDER — ATORVASTATIN CALCIUM 40 MG/1
40 TABLET, FILM COATED ORAL NIGHTLY
Status: DISCONTINUED | OUTPATIENT
Start: 2025-05-24 | End: 2025-05-26 | Stop reason: HOSPADM

## 2025-05-23 RX ORDER — HYDRALAZINE HYDROCHLORIDE 50 MG/1
50 TABLET, FILM COATED ORAL EVERY 8 HOURS
Status: DISCONTINUED | OUTPATIENT
Start: 2025-05-24 | End: 2025-05-26 | Stop reason: HOSPADM

## 2025-05-23 RX ORDER — METRONIDAZOLE 500 MG/100ML
500 INJECTION, SOLUTION INTRAVENOUS
Status: DISCONTINUED | OUTPATIENT
Start: 2025-05-23 | End: 2025-05-23 | Stop reason: HOSPADM

## 2025-05-23 RX ORDER — CHOLECALCIFEROL (VITAMIN D3) 25 MCG
1000 TABLET ORAL DAILY
Status: DISCONTINUED | OUTPATIENT
Start: 2025-05-24 | End: 2025-05-26 | Stop reason: HOSPADM

## 2025-05-23 RX ORDER — AMIODARONE HYDROCHLORIDE 200 MG/1
400 TABLET ORAL DAILY
Status: DISCONTINUED | OUTPATIENT
Start: 2025-05-24 | End: 2025-05-26 | Stop reason: HOSPADM

## 2025-05-23 RX ORDER — OMEGA-3/DHA/EPA/FISH OIL 300-1000MG
1 CAPSULE,DELAYED RELEASE (ENTERIC COATED) ORAL DAILY
Status: DISCONTINUED | OUTPATIENT
Start: 2025-05-24 | End: 2025-05-26 | Stop reason: HOSPADM

## 2025-05-23 RX ORDER — WARFARIN 2.5 MG/1
2.5 TABLET ORAL
Status: DISCONTINUED | OUTPATIENT
Start: 2025-05-29 | End: 2025-05-26 | Stop reason: HOSPADM

## 2025-05-23 RX ORDER — PREGABALIN 50 MG/1
100 CAPSULE ORAL EVERY OTHER DAY
Status: DISCONTINUED | OUTPATIENT
Start: 2025-05-24 | End: 2025-05-26 | Stop reason: HOSPADM

## 2025-05-23 RX ADMIN — VANCOMYCIN HYDROCHLORIDE 500 MG: KIT at 04:05

## 2025-05-23 RX ADMIN — METRONIDAZOLE 500 MG: 5 INJECTION, SOLUTION INTRAVENOUS at 03:05

## 2025-05-23 NOTE — PROGRESS NOTES
[facility] - Cardiology Stepdown  Heart Transplant  Progress Note    Patient Name: Radha Abbott  MRN: [id number]  Admission Date: 6/11/2024  Hospital Length of Stay: 17 days  Attending Physician: Juventino Bermudez Jr.*  Primary Care Provider: Vasu Kong MD  Principal Problem:Acute renal failure superimposed on stage 4 chronic kidney disease    Subjective:   Interval History:   -no acute events  -plan for HD today    Subjective:  -feeling well, no questions    Continuous Infusions:  Scheduled Meds:   amiodarone  400 mg Oral Daily    amoxicillin  500 mg Per NG tube Daily    atorvastatin  40 mg Oral QHS    cyanocobalamin  250 mcg Oral Daily    epoetin zohaib (PROCRIT) injection  10,000 Units Intravenous Every Mon, Wed, Fri    folic acid  1 mg Oral Daily    gabapentin  100 mg Oral BID    LIDOcaine  2 patch Transdermal Q24H    midodrine  10 mg Oral Q8H    pantoprazole  40 mg Oral Daily    QUEtiapine  100 mg Oral QHS    sevelamer carbonate  0.8 g Oral TID WM    sodium bicarbonate  650 mg Oral TID    venlafaxine  37.5 mg Oral Daily    vitamin D  1,000 Units Oral Daily    warfarin  2.5 mg Oral Daily     PRN Meds:  Current Facility-Administered Medications:     0.9%  NaCl infusion (for blood administration), , Intravenous, Q24H PRN    0.9% NaCl, , Intravenous, PRN    acetaminophen, 650 mg, Oral, Q6H PRN    acetaminophen, 650 mg, Per NG tube, Q6H PRN    dextrose 10%, 12.5 g, Intravenous, PRN    dextrose 10%, 12.5 g, Intravenous, PRN    dextrose 10%, 25 g, Intravenous, PRN    dextrose 10%, 25 g, Intravenous, PRN    glucagon (human recombinant), 1 mg, Intramuscular, PRN    glucose, 16 g, Oral, PRN    glucose, 24 g, Oral, PRN    insulin aspart U-100, 0-10 Units, Subcutaneous, QID (AC + HS) PRN    simethicone, 1 tablet, Oral, TID PRN    traZODone, 25 mg, Oral, Nightly PRN    Review of patient's allergies indicates:  No Known Allergies  Objective:     Vital Signs (Most Recent):  Temp: 97.4 °F (36.3 °C) (06/28/24  1121)  Pulse: 81 (06/28/24 1122)  Resp: 18 (06/28/24 1121)  BP: (!) 60/0 (06/28/24 0830)  SpO2: 98 % (06/28/24 1121) Vital Signs (24h Range):  Temp:  [97.4 °F (36.3 °C)-98.6 °F (37 °C)] 97.4 °F (36.3 °C)  Pulse:  [74-86] 81  Resp:  [18] 18  SpO2:  [97 %-99 %] 98 %  BP: (60-84)/(0) 60/0     Patient Vitals for the past 72 hrs (Last 3 readings):   Weight   06/28/24 0744 71.8 kg (158 lb 4.6 oz)   06/27/24 0530 71.4 kg (157 lb 6.5 oz)   06/26/24 0915 72 kg (158 lb 11.7 oz)     Body mass index is 20.88 kg/m².      Intake/Output Summary (Last 24 hours) at 6/28/2024 1418  Last data filed at 6/28/2024 0942  Gross per 24 hour   Intake 356 ml   Output --   Net 356 ml       Hemodynamic Parameters:       Telemetry: sinus rhythm       Physical Exam  Constitutional:       Appearance: Normal appearance.   Cardiovascular:      Comments: JVP at clavicle; VAD hum  Pulmonary:      Effort: Pulmonary effort is normal.      Breath sounds: Normal breath sounds.   Musculoskeletal:      Right lower leg: No edema.      Left lower leg: No edema.   Skin:     General: Skin is warm and dry.   Neurological:      Mental Status: He is alert.            Significant Labs:  CBC:  Recent Labs   Lab 06/25/24  0407 06/26/24  0500 06/27/24  0428   WBC 6.26 7.52 6.88   RBC 3.02* 2.72* 2.93*   HGB 8.3* 7.5* 8.3*   HCT 25.9* 24.0* 26.3*    460* 475*   MCV 86 88 90   MCH 27.5 27.6 28.3   MCHC 32.0 31.3* 31.6*     BNP:  Recent Labs   Lab 06/24/24  0533 06/26/24  0500 06/28/24  0406   * 598* 1,736*     CMP:  Recent Labs   Lab 06/24/24  0533 06/25/24  0407 06/26/24  0500 06/27/24  0427 06/28/24  0406   GLU 89 134* 93 133*  --    CALCIUM 9.0 9.1 8.4* 8.7  --    ALBUMIN 2.0*  --  2.0*  --  1.9*   PROT 7.2  --  7.3  --  6.8    135* 139 138  --    K 4.4 4.7 4.1 4.2  --    CO2 27 22* 25 21*  --    CL 93* 95 101 102  --    BUN 89* 107* 42* 57*  --    CREATININE 6.1* 7.1* 4.0* 5.8*  --    ALKPHOS 256*  --  245*  --  195*   ALT 70*  --  55*  --  47*    AST 68*  --  64*  --  61*   BILITOT 0.5  --  0.5  --  0.4      Coagulation:   Recent Labs   Lab 06/26/24  0500 06/27/24  0428 06/28/24  0406   INR 2.4* 2.4* 2.4*   APTT 43.0* 44.0* 43.0*     LDH:  Recent Labs   Lab 06/26/24  0500 06/27/24  0427 06/28/24  0406   * 274* 291*     Microbiology:  Microbiology Results (last 7 days)       ** No results found for the last 168 hours. **            I have reviewed all pertinent labs within the past 24 hours.    Estimated Creatinine Clearance: 13.4 mL/min (A) (based on SCr of 5.8 mg/dL (H)).    Diagnostic Results:  I have reviewed and interpreted all pertinent imaging results/findings within the past 24 hours.  Assessment and Plan:     Mr. Radha Abbott is a 65 yo male with stage D HFrEF, ICMP who underwent HM3 placement as DT (12/1/23) w/ a hospital stay complicated by vasoplegia(requiring Giaprezza), debility, malnutrition/impaired swallowing, and renal failure requiring HD (since weaned off) w/ recent discharge for ADHF presented to the ED with wife due to c/o worsening generalized weakness since 6/10. Per wife patient was mostly sleeping during the day despite sleeping 10 hrs at night. Patient also admits decreased appetite and decreased urine output despite taking his medications as prescribed. He has not urinated since 6/11 at 2pm. He has chronic shortness of breath on exertion. Denies confusion, syncope, diarrhea, constipation, N/V, dysuria, or other complaints. Patient had his prior tunnel cath removed used for HD about 2 weeks ago. He completed a 6 week therapy for Enterococcus bacteremia with ampicillin and rocephin ending 5/30/24a and was switched to PO antibiotics.     On prior hospital stay nephrology consulted during hospital stay due to elevated renal function. They feel he is close to being a dialysis patient. He diuresed on IV Bumex with prn Metolazone. He is net negative 1.2L throughout his hospital stay and weight on day of discharge was 179lbs down  Class I (easy) - visualization of the soft palate, fauces, uvula, and both anterior and posterior pillars from 186lbs on admit. We continued his home dose of Bumex 4mg BID but increased his prn Metolazone to 10mg. He received a dose of Epo while inpatient and he has plans to follow up with Heme/Onc to get outpatient therapy arranged.      2D Echo with CFD done on 3/26/2024  LVAD: There is a Heartmate III LVAD running at 5000 RPM. The aortic valve does not open. The ventricular septum is at midline. Inflow cannula well seated at the apex. Outflow graft not visualized.   Left Ventricle: The left ventricle is moderately dilated. Normal wall thickness. Global hypokinesis present. Septal motion is abnormal. There is severely reduced systolic function with a visually estimated ejection fraction of 5 - 10%. There is diastolic dysfunction but grade cannot be determined.   Right Ventricle: Mild right ventricular enlargement. Wall thickness is normal. Right ventricle wall motion is normal. Systolic function is normal. Pacemaker lead present in the ventricle.   Left Atrium: Left atrium is severely dilated.   Right Atrium: Right atrium is moderately dilated.   Mitral Valve: The mitral valve is repaired with an Noble stitch. There is mild regurgitation.   IVC/SVC: Normal venous pressure at 3 mmHg.              * Acute renal failure superimposed on stage 4 chronic kidney disease  Mr. 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 Admitted for worsening renal function with a BUN>100/Cr. 9.9.     - CT Ab/pel: Kidneys/ Ureters: Normal in size and location.  Mild bilateral perinephric stranding, a nonspecific finding and similar to prior.  No hydronephrosis or nephrolithiasis. No ureteral dilatation.  - tunneled line placed 6/24 for HD   - HD per nephro   - Will need OP HD upon discharge, recommend CM/SW involvement to start workup.  - Renally dosing medications      Bacteremia due to  Enterococcus  He completed a 6 week therapy for Enterococcus bacteremia with ampicillin and rocephin ending 5/30/24a and was switched to Amoxicillin for suppression    Anticoagulant long-term use  Cont warfarin    Anemia  Chronic with no acute bleeding, multifactorial 2/2 CKD4 and chronic anticoagulation.  Low-normal Vitamin B12 and elevated MMA c/w Vitamin B12 deficiency.  -s/p Epo   -Replaced IV iron   -Vitamin B12 supplementation  -reduce lab frequency given stable CBC and electrolytes    Adjustment disorder with depressed mood  Cont SSRI    LVAD (left ventricular assist device) present  -HeartMate 3 Implanted  12/1/2023  as DT  -Cont Coumadin, dosing by PharmD.  Goal INR 2.0-3.0. therapeutic today.   -Antiplatelets Not on ASA  -LDH is stable overall today. Will continue to monitor daily.  -Speed set at  5000     -Interrogation notable for no events  -Not listed for OHTx, declined for OHTX due to comorbidities     Procedure: Device Interrogation Including analysis of device parameters  Current Settings: Ventricular Assist Device  Review of device function is stable        6/28/2024     8:29 AM 6/28/2024     8:20 AM 6/28/2024     4:45 AM 6/28/2024    12:06 AM 6/27/2024     8:20 PM 6/27/2024     5:48 PM 6/27/2024    12:15 PM   TXP LVAD INTERROGATIONS   Type HeartMate3  HeartMate3 HeartMate3 HeartMate3 HeartMate3 HeartMate3   Flow  3.5 9.7 3.9 3.8 4.2 3.8   Speed  5000 5000 5050 5000 5000 5000   PI  3.4 5.3 4.2 5.3 4.1 4.4   Power ([place])  3.3 3.4 3.4 3.4 3.4 3.5   LSL  4600 4600 4600 4600     Pulsatility   Intermittent pulse Intermittent pulse Intermittent pulse           PAF (paroxysmal atrial fibrillation)  -Continue amiodarone and Coumadin      Type 2 diabetes mellitus without complication, without long-term current use of insulin  -Endocrine    Acute on chronic combined systolic and diastolic heart failure  Owing to his advanced kidney disease and significantly elevated creat, he is not on a  ACEI/ARB/ARNI or  MRA. He is also not on a beta blocker due to RV dysfunction.     Plan:   -Volume removal with HD   - See LVAD  - See IVÁN    CAD (coronary artery disease)  -Continue statin        Mariann Bee MD  Heart Transplant  [facility] - Cardiology Stepdown

## 2025-05-24 LAB
ABSOLUTE EOSINOPHIL (OHS): 0.17 K/UL
ABSOLUTE MONOCYTE (OHS): 0.34 K/UL (ref 0.3–1)
ABSOLUTE NEUTROPHIL COUNT (OHS): 3.23 K/UL (ref 1.8–7.7)
ANION GAP (OHS): 11 MMOL/L (ref 8–16)
ANION GAP (OHS): 9 MMOL/L (ref 8–16)
BASOPHILS # BLD AUTO: 0.02 K/UL
BASOPHILS NFR BLD AUTO: 0.5 %
BUN SERPL-MCNC: 13 MG/DL (ref 8–23)
BUN SERPL-MCNC: 9 MG/DL (ref 8–23)
CALCIUM SERPL-MCNC: 7.5 MG/DL (ref 8.7–10.5)
CALCIUM SERPL-MCNC: 7.5 MG/DL (ref 8.7–10.5)
CHLORIDE SERPL-SCNC: 104 MMOL/L (ref 95–110)
CHLORIDE SERPL-SCNC: 104 MMOL/L (ref 95–110)
CO2 SERPL-SCNC: 22 MMOL/L (ref 23–29)
CO2 SERPL-SCNC: 25 MMOL/L (ref 23–29)
CREAT SERPL-MCNC: 4 MG/DL (ref 0.5–1.4)
CREAT SERPL-MCNC: 4.9 MG/DL (ref 0.5–1.4)
ERYTHROCYTE [DISTWIDTH] IN BLOOD BY AUTOMATED COUNT: 19.8 % (ref 11.5–14.5)
FERRITIN SERPL-MCNC: 4766 NG/ML (ref 20–300)
GFR SERPLBLD CREATININE-BSD FMLA CKD-EPI: 13 ML/MIN/1.73/M2
GFR SERPLBLD CREATININE-BSD FMLA CKD-EPI: 16 ML/MIN/1.73/M2
GLUCOSE SERPL-MCNC: 180 MG/DL (ref 70–110)
GLUCOSE SERPL-MCNC: 91 MG/DL (ref 70–110)
HCT VFR BLD AUTO: 25.6 % (ref 40–54)
HGB BLD-MCNC: 7.5 GM/DL (ref 14–18)
IMM GRANULOCYTES # BLD AUTO: 0.02 K/UL (ref 0–0.04)
IMM GRANULOCYTES NFR BLD AUTO: 0.5 % (ref 0–0.5)
INR PPP: 3 (ref 0.8–1.2)
IRON SATN MFR SERPL: 33 % (ref 20–50)
IRON SERPL-MCNC: 47 UG/DL (ref 45–160)
LYMPHOCYTES # BLD AUTO: 0.53 K/UL (ref 1–4.8)
MAGNESIUM SERPL-MCNC: 1.7 MG/DL (ref 1.6–2.6)
MCH RBC QN AUTO: 24.1 PG (ref 27–31)
MCHC RBC AUTO-ENTMCNC: 29.3 G/DL (ref 32–36)
MCV RBC AUTO: 82 FL (ref 82–98)
NUCLEATED RBC (/100WBC) (OHS): 0 /100 WBC
PHOSPHATE SERPL-MCNC: 1.9 MG/DL (ref 2.7–4.5)
PLATELET # BLD AUTO: 198 K/UL (ref 150–450)
PMV BLD AUTO: 9.4 FL (ref 9.2–12.9)
POTASSIUM SERPL-SCNC: 3.2 MMOL/L (ref 3.5–5.1)
POTASSIUM SERPL-SCNC: 4.1 MMOL/L (ref 3.5–5.1)
PROTHROMBIN TIME: 30.3 SECONDS (ref 9–12.5)
RBC # BLD AUTO: 3.11 M/UL (ref 4.6–6.2)
RELATIVE EOSINOPHIL (OHS): 3.9 %
RELATIVE LYMPHOCYTE (OHS): 12.3 % (ref 18–48)
RELATIVE MONOCYTE (OHS): 7.9 % (ref 4–15)
RELATIVE NEUTROPHIL (OHS): 74.9 % (ref 38–73)
SODIUM SERPL-SCNC: 137 MMOL/L (ref 136–145)
SODIUM SERPL-SCNC: 138 MMOL/L (ref 136–145)
TIBC SERPL-MCNC: 142 UG/DL (ref 250–450)
TRANSFERRIN SERPL-MCNC: 96 MG/DL (ref 200–375)
WBC # BLD AUTO: 4.31 K/UL (ref 3.9–12.7)

## 2025-05-24 PROCEDURE — 99900035 HC TECH TIME PER 15 MIN (STAT)

## 2025-05-24 PROCEDURE — 83540 ASSAY OF IRON: CPT

## 2025-05-24 PROCEDURE — 25000003 PHARM REV CODE 250

## 2025-05-24 PROCEDURE — 80048 BASIC METABOLIC PNL TOTAL CA: CPT | Performed by: STUDENT IN AN ORGANIZED HEALTH CARE EDUCATION/TRAINING PROGRAM

## 2025-05-24 PROCEDURE — 93750 INTERROGATION VAD IN PERSON: CPT | Mod: 76,,, | Performed by: INTERNAL MEDICINE

## 2025-05-24 PROCEDURE — 99223 1ST HOSP IP/OBS HIGH 75: CPT | Mod: ,,, | Performed by: INTERNAL MEDICINE

## 2025-05-24 PROCEDURE — 20600001 HC STEP DOWN PRIVATE ROOM

## 2025-05-24 PROCEDURE — 25000242 PHARM REV CODE 250 ALT 637 W/ HCPCS: Performed by: STUDENT IN AN ORGANIZED HEALTH CARE EDUCATION/TRAINING PROGRAM

## 2025-05-24 PROCEDURE — 85025 COMPLETE CBC W/AUTO DIFF WBC: CPT | Performed by: STUDENT IN AN ORGANIZED HEALTH CARE EDUCATION/TRAINING PROGRAM

## 2025-05-24 PROCEDURE — 82728 ASSAY OF FERRITIN: CPT

## 2025-05-24 PROCEDURE — 99223 1ST HOSP IP/OBS HIGH 75: CPT | Mod: ,,, | Performed by: STUDENT IN AN ORGANIZED HEALTH CARE EDUCATION/TRAINING PROGRAM

## 2025-05-24 PROCEDURE — 84100 ASSAY OF PHOSPHORUS: CPT | Performed by: STUDENT IN AN ORGANIZED HEALTH CARE EDUCATION/TRAINING PROGRAM

## 2025-05-24 PROCEDURE — 83735 ASSAY OF MAGNESIUM: CPT | Performed by: STUDENT IN AN ORGANIZED HEALTH CARE EDUCATION/TRAINING PROGRAM

## 2025-05-24 PROCEDURE — 80048 BASIC METABOLIC PNL TOTAL CA: CPT

## 2025-05-24 PROCEDURE — 25000003 PHARM REV CODE 250: Performed by: STUDENT IN AN ORGANIZED HEALTH CARE EDUCATION/TRAINING PROGRAM

## 2025-05-24 PROCEDURE — 99223 1ST HOSP IP/OBS HIGH 75: CPT | Mod: ,,, | Performed by: NURSE PRACTITIONER

## 2025-05-24 PROCEDURE — 85610 PROTHROMBIN TIME: CPT | Performed by: STUDENT IN AN ORGANIZED HEALTH CARE EDUCATION/TRAINING PROGRAM

## 2025-05-24 PROCEDURE — 27000248 HC VAD-ADDITIONAL DAY

## 2025-05-24 PROCEDURE — 36415 COLL VENOUS BLD VENIPUNCTURE: CPT

## 2025-05-24 PROCEDURE — 36415 COLL VENOUS BLD VENIPUNCTURE: CPT | Performed by: STUDENT IN AN ORGANIZED HEALTH CARE EDUCATION/TRAINING PROGRAM

## 2025-05-24 RX ORDER — POTASSIUM CHLORIDE 20 MEQ/1
60 TABLET, EXTENDED RELEASE ORAL ONCE
Status: DISCONTINUED | OUTPATIENT
Start: 2025-05-24 | End: 2025-05-24

## 2025-05-24 RX ORDER — OXYCODONE HYDROCHLORIDE 5 MG/1
5 TABLET ORAL 2 TIMES DAILY PRN
Status: DISCONTINUED | OUTPATIENT
Start: 2025-05-24 | End: 2025-05-26 | Stop reason: HOSPADM

## 2025-05-24 RX ORDER — POTASSIUM CHLORIDE 20 MEQ/1
60 TABLET, EXTENDED RELEASE ORAL ONCE
Status: COMPLETED | OUTPATIENT
Start: 2025-05-24 | End: 2025-05-24

## 2025-05-24 RX ADMIN — WARFARIN SODIUM 1.25 MG: 2.5 TABLET ORAL at 04:05

## 2025-05-24 RX ADMIN — PSYLLIUM HUSK 2 PACKET: 3.4 POWDER ORAL at 08:05

## 2025-05-24 RX ADMIN — Medication 1 CAPSULE: at 08:05

## 2025-05-24 RX ADMIN — VANCOMYCIN HYDROCHLORIDE 125 MG: KIT at 05:05

## 2025-05-24 RX ADMIN — Medication 1000 UNITS: at 08:05

## 2025-05-24 RX ADMIN — ATORVASTATIN CALCIUM 40 MG: 40 TABLET, FILM COATED ORAL at 09:05

## 2025-05-24 RX ADMIN — VANCOMYCIN HYDROCHLORIDE 125 MG: KIT at 07:05

## 2025-05-24 RX ADMIN — VENLAFAXINE 37.5 MG: 37.5 TABLET ORAL at 08:05

## 2025-05-24 RX ADMIN — POTASSIUM CHLORIDE 60 MEQ: 1500 TABLET, EXTENDED RELEASE ORAL at 07:05

## 2025-05-24 RX ADMIN — VANCOMYCIN HYDROCHLORIDE 125 MG: KIT at 12:05

## 2025-05-24 RX ADMIN — AMIODARONE HYDROCHLORIDE 400 MG: 200 TABLET ORAL at 08:05

## 2025-05-24 RX ADMIN — PREGABALIN 100 MG: 50 CAPSULE ORAL at 08:05

## 2025-05-24 RX ADMIN — HYDRALAZINE HYDROCHLORIDE 50 MG: 50 TABLET ORAL at 02:05

## 2025-05-24 RX ADMIN — HYDRALAZINE HYDROCHLORIDE 50 MG: 50 TABLET ORAL at 05:05

## 2025-05-24 RX ADMIN — OXYCODONE 5 MG: 5 TABLET ORAL at 12:05

## 2025-05-24 RX ADMIN — SEVELAMER CARBONATE 800 MG: 800 TABLET, FILM COATED ORAL at 08:05

## 2025-05-24 RX ADMIN — PSYLLIUM HUSK 2 PACKET: 3.4 POWDER ORAL at 09:05

## 2025-05-24 NOTE — NURSING
Patient BP was 112/83 map 93, when I did a doppler it was 120/0 patient asymptomatic, notified MD Hurst and informed him I was administering his scheduled dose of hydralazine 50mg. No other orders provided.

## 2025-05-24 NOTE — PROGRESS NOTES
[facility] - Cardiac Intensive Care  Heart Transplant  Progress Note    Patient Name: Radha Abbott  MRN: [id number]  Admission Date: 5/23/2025  Hospital Length of Stay: 1 days  Attending Physician: Sajan Hurley, *  Primary Care Provider: Vasu Kong MD  Principal Problem:<principal problem not specified>    Subjective:   Interval History: pt and wife reporting he continues to have diarrhea. Unfortunatley diarrhea improved originally for a few days with solid BM prior to last d/c and restarted once pt went home/got treatment for UTI. Continued on vancomycin taper. He reports no diarrhea today and denies any melena/hematochezia, but feels like his buttocks is very raw and painful from so much diarrhea.     Continuous Infusions:  Scheduled Meds:   amiodarone  400 mg Oral Daily    amitriptyline  25 mg Oral Once per day on Monday Wednesday Friday    atorvastatin  40 mg Oral QHS    hydrALAZINE  50 mg Oral Q8H    omega 3-dha-epa-fish oil  1 capsule Oral Daily    pregabalin  100 mg Oral Every other day    psyllium husk  2 packet Oral BID    sevelamer carbonate  800 mg Oral TID WM    traZODone  25 mg Oral QHS    vancomycin  125 mg Oral Q6H    venlafaxine  37.5 mg Oral Daily    vitamin D  1,000 Units Oral Daily    warfarin  1.25 mg Oral Once per day on Sunday Monday Tuesday Wednesday Friday Saturday    [START ON 5/29/2025] warfarin  2.5 mg Oral Every Thursday     PRN Meds:  Current Facility-Administered Medications:     oxyCODONE, 5 mg, Oral, BID PRN    Review of patient's allergies indicates:  No Known Allergies  Objective:     Vital Signs (Most Recent):  Temp: 98.5 °F (36.9 °C) (05/24/25 1125)  Pulse: 69 (05/24/25 1125)  Resp: 18 (05/24/25 1234)  BP: (!) 82/0 (05/24/25 1200)  SpO2: 100 % (05/24/25 1125) Vital Signs (24h Range):  Temp:  [98 °F (36.7 °C)-99 °F (37.2 °C)] 98.5 °F (36.9 °C)  Pulse:  [69-84] 69  Resp:  [10-18] 18  SpO2:  [97 %-100 %] 100 %  BP: ()/(0-83) 82/0     Patient Vitals for the past  "72 hrs (Last 3 readings):   Weight   05/23/25 2330 86.5 kg (190 lb 11.2 oz)     Body mass index is 25.16 kg/m².      Intake/Output Summary (Last 24 hours) at 5/24/2025 1404  Last data filed at 5/24/2025 1312  Gross per 24 hour   Intake --   Output 200 ml   Net -200 ml       Hemodynamic Parameters:       Telemetry: reviewed       Physical Exam  Vitals and nursing note reviewed.   Constitutional:       Appearance: He is normal weight.   HENT:      Head: Normocephalic and atraumatic.      Right Ear: External ear normal.      Left Ear: External ear normal.      Nose: Nose normal.      Mouth/Throat:      Mouth: Mucous membranes are moist.      Pharynx: Oropharynx is clear.   Eyes:      Conjunctiva/sclera: Conjunctivae normal.   Cardiovascular:      Pulses: Normal pulses.      Heart sounds: Normal heart sounds.      Comments: Nl vad hum  Pulmonary:      Effort: Pulmonary effort is normal.      Breath sounds: Normal breath sounds.   Abdominal:      General: Abdomen is flat. Bowel sounds are normal.      Palpations: Abdomen is soft.   Musculoskeletal:      Cervical back: Normal range of motion.      Right lower leg: No edema.      Left lower leg: No edema.   Neurological:      Mental Status: He is alert.            Significant Labs:  CBC:  Recent Labs   Lab 05/23/25  1402 05/24/25  0552   WBC 5.00 4.31   RBC 3.47* 3.11*   HGB 8.5* 7.5*   HCT 28.9* 25.6*    198   MCV 83 82   MCH 24.5* 24.1*   MCHC 29.4* 29.3*     BNP:  No results for input(s): "BNP" in the last 168 hours.    Invalid input(s): "BNPTRIAGELBLO"  CMP:  Recent Labs   Lab 05/23/25  1402 05/24/25  0552   * 91   CALCIUM 8.0* 7.5*   ALBUMIN 2.4*  --    PROT 7.1  --     138   K 3.4* 3.2*   CO2 25 25    104   BUN 7* 9   CREATININE 3.4* 4.0*   ALKPHOS 94  --    ALT 32  --    AST 31  --    BILITOT 0.5  --       Coagulation:   Recent Labs   Lab 05/22/25  1104 05/23/25  1519 05/24/25  0552   INR 2.0* 2.5* 3.0*   APTT  --  37.5*  --      LDH:  No " "results for input(s): "LDH" in the last 72 hours.  Microbiology:  Microbiology Results (last 7 days)       Procedure Component Value Units Date/Time    Stool culture [[id number]]     Order Status: Sent Specimen: Stool             I have reviewed all pertinent labs within the past 24 hours.    Estimated Creatinine Clearance: 21.4 mL/min (A) (based on SCr of 4 mg/dL (H)).    Diagnostic Results:  I have reviewed and interpreted all pertinent imaging results/findings within the past 24 hours.  Assessment and Plan:     64M PMHx of stage D HFrEF 2/2 ICM s/p HM3 as DT (12/1/2023), ESRD on HD (MWF), and debility here for ongoing diarrhea after treatment of c. difficile infection. Pertinent history below:    5/9-11: Patient was recently hospitalized for anemia and C-difficile infection. At that time patient received a unit of blood, hydrated for low flow alarms, hydralazine given to target MAPs to 80s with Hydral 50mg TID, then put on extended vancomycin course with taper for discharge on 5/11  5 mLs (125 mg total) q6 hours for 9 days,   5 mLs (125 mg total) q12h day for 7 days,   5 mLs (125 mg total) qdaily for 7 days,   5 mLs (125 mg total) every other day for 14 days.    5/12: Then presented for UTI symptoms for which he took Fosfomycin. The Urine cultures grew pseudomonas aeruginosa which ID recommended to forego further treatment if no longer having UTI symptoms.    5/23/25: Patient reports today for ongoing weakness and diarrhea. States that he has been taking the vancomycin and has been on the 125mg q12h dosage. He had C. diff toxin and antigen collected which both resulted negative. Otherwise his labwork has been stable with wbc 5.0, hgb 8.5, borderline K 3.4, INR 2.0. At the outside hospital he was given IV metronidazole 500mg and oral vancomycin 500mg once. He has been taking loperamide every 4 hours, and had one dose of metamucil.       Anemia  Received 1 unit pRBC in early May for hgb to 6.8. Hgb is 8.5 on admit, " dec to 7.5 next day. BP stable. N o visible blood in stool.  His INR is 2 and no signs of acute bleeding  CTM  -iron studies pending     LVAD (left ventricular assist device) present  Stage D HFrEF 2/2 ICM s/p HM3 as DT (12/1/2023). Not decompensated.  Blood thinner: 1.25mg all other days. 2.5mg on Thursdays  Hydral 50mg q8h. Previously had low flow alarms with high maps and anemia    Procedure: Device Interrogation Including analysis of device parameters  Current Settings: Ventricular Assist Device  Review of device function is stable/unstable stable      5/24/2025    12:00 PM 5/24/2025     8:00 AM 5/24/2025     5:13 AM 5/23/2025    11:03 PM 5/11/2025     7:43 AM 5/11/2025     4:49 AM 5/10/2025    11:32 PM   TXP LVAD INTERROGATIONS   Type HeartMate3 HeartMate3 HeartMate3 HeartMate3 HeartMate3 HeartMate3 HeartMate3   Flow 3.5 4.1 2.9 3.3 3.8 3.6 4.1   Speed 5050 5000 5000 5000 5000 5000 5000   PI 6 4.4 9.2 6.6 4.9 5.6 4.4   Power ([place]) 3.3 3.4 3.4 3.3 3.4 3.4 3.4   LSL    4600 4600 4600 4600   Pulsatility Pulse Pulse Pulse Pulse No Pulse No Pulse No Pulse         ESRD (end stage renal disease)  Nephrology consult for F Dialysis    Diarrhea of presumed infectious origin  Patient c.difficile tox and ag studies are negative. He was treated on oral vancomycin taper (125mg q6h for 9d, q12h for 7d, qdaily for 7d, qEveryOtherDay for 14d) after his 5/9-11 admission and is most recently on the q12h dosing which he has been compliant with. 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  -Stool studies: culture, Ova & Parasites, Giardia/crypto, Rotavirus, Calprotectin, Fecal Fat, Pancreatic Elastase   -Metamucil BID   -ID consult, appreciate recs. Received oral vanc 500mg and IV metro 500mg on 5/23/25 evening prior to transfer.   -per ID, cont vancomycin taper. Monitor stool frequency. Can transition to fidoxymycin if no improvement in stools.         Lilli Mckenzie MD  Heart Transplant  [facility] - Cardiac  Intensive Care

## 2025-05-24 NOTE — HPI
64M PMHx of stage D HFrEF 2/2 ICM s/p HM3 as DT (12/1/2023), ESRD on HD (MWF), and debility here for ongoing diarrhea after treatment of c. difficile infection. Pertinent history below:    5/9-11: Patient was recently hospitalized for anemia and C-difficile infection. At that time patient received a unit of blood, hydrated for low flow alarms, hydralazine given to target MAPs to 80s with Hydral 50mg TID, then put on extended vancomycin course with taper for discharge on 5/11  5 mLs (125 mg total) q6 hours for 9 days,   5 mLs (125 mg total) q12h day for 7 days,   5 mLs (125 mg total) qdaily for 7 days,   5 mLs (125 mg total) every other day for 14 days.    5/12: Then presented for UTI symptoms for which he took Fosfomycin. The Urine cultures grew pseudomonas aeruginosa which ID recommended to forego further treatment if no longer having UTI symptoms.    5/23/25: Patient reports today for ongoing weakness and diarrhea. States that he has been taking the vancomycin and has been on the 125mg q12h dosage. He had C. diff toxin and antigen collected which both resulted negative. Otherwise his labwork has been stable with wbc 5.0, hgb 8.5, borderline K 3.4, INR 2.0. At the outside hospital he was given IV metronidazole 500mg and oral vancomycin 500mg once. He has been taking loperamide every 4 hours, and had one dose of metamucil.

## 2025-05-24 NOTE — CONSULTS
[facility] - Cardiac Intensive Care  Nephrology  Consult Note    Patient Name: Radha Abbott  MRN: [id number]  Admission Date: 5/23/2025  Hospital Length of Stay: 1 days  Attending Provider: Sajan Hurley, *   Primary Care Physician: Vasu Kong MD  Principal Problem:<principal problem not specified>    Inpatient consult to Nephrology  Consult performed by: Yolanda Cai, RONI  Consult ordered by: Javed Vvias MD  Reason for consult: ESRD        Subjective:     HPI:  64 y/o AAM w/ PMHx of stage D HFrEF 2/2 ICM s/p HM3 as DT (12/1/2023), ESRD on HD (MWF), and debility who presented to the emergency department due to diarrhea. Recent admission for CDAD on prolonged vanco taper here for diarrhea. Last HD prior to presentation was yesterday 5/23. Nephrology consulted for ESRD.         Past Medical History:   Diagnosis Date    Aspiration pneumonia of both lungs 06/17/2024    CAD (coronary artery disease)     CHF (congestive heart failure)     Delirium 06/16/2024    Diabetes mellitus     HFrEF (heart failure with reduced ejection fraction)     Hypoglycemia 06/12/2024    ICD (implantable cardioverter-defibrillator) in place     LVAD (left ventricular assist device) present 12/01/2023    MI, old     PAF (paroxysmal atrial fibrillation) 10/11/2023    Stage 4 chronic kidney disease 02/15/2024    Type 2 diabetes mellitus without complication, without long-term current use of insulin 10/07/2023       Past Surgical History:   Procedure Laterality Date    ANGIOPLASTY-VENOUS ARTERY Right 12/1/2023    Procedure: ANGIOPLASTY-VENOUS ARTERY, RIGHT FEMORAL;  Surgeon: Yuri Washington MD;  Location: [facility] OR [address] [place] [address];  Service: Cardiovascular;  Laterality: Right;    AORTIC VALVULOPLASTY N/A 12/1/2023    Procedure: REPAIR, AORTIC VALVE;  Surgeon: Yuri Washington MD;  Location: [facility] OR [address] [place] [address];  Service: Cardiovascular;  Laterality: N/A;    CARDIAC SURGERY      CLOSURE N/A 12/1/2023    Procedure: CLOSURE, TEMPORARY;   Surgeon: Yuri Washington MD;  Location: [facility] OR [facility] FLR;  Service: Cardiovascular;  Laterality: N/A;    DRAINAGE OF PLEURAL EFFUSION  12/4/2023    Procedure: DRAINAGE, PLEURAL EFFUSION;  Surgeon: Yuri Washington MD;  [facility]  Service: Cardiovascular;;    INSERTION OF GRAFT TO PERICARDIUM  12/4/2023    Procedure: INSERTION, GRAFT, PERICARDIUM;  Surgeon: Yuri Washington MD;  [facility];  Service: Cardiovascular;;    INSERTION OF INTRA-AORTIC BALLOON ASSIST DEVICE Right 11/21/2023    Procedure: INSERTION, INTRA-AORTIC BALLOON PUMP;  Surgeon: Finn Cohn MD;  Location: [facility] CATH LAB;  Service: Cardiology;  Laterality: Right;    LEFT VENTRICULAR ASSIST DEVICE Left 12/1/2023    Procedure: INSERTION-LEFT VENTRICULAR ASSIST DEVICE;  Surgeon: Yuri Washington MD;  [facility];  Service: Cardiovascular;  Laterality: Left;  REDO STERNOTOMY - REDO SAW NEEDED FOR CASE    LYSIS OF ADHESIONS  12/1/2023    Procedure: LYSIS, ADHESIONS;  Surgeon: Yuri Washington MD;  [facility];  Service: Cardiovascular;;    PLACEMENT OF SWAN ROLANDO CATHETER WITH IMAGING GUIDANCE  11/20/2023    Procedure: INSERTION, CATHETER, SWAN-ROLANDO, WITH IMAGING GUIDANCE;  Surgeon: Sajan Hurley MD;  Location: [facility] CATH LAB;  Service: Cardiology;;    REMOVAL OF TUNNELED CENTRAL VENOUS CATHETER (CVC) N/A 3/1/2024    Procedure: REMOVAL, CATHETER, CENTRAL VENOUS, TUNNELED;  Surgeon: Seble Aguilar MD;  Location: [facility] CATH LAB;  Service: Interventional Nephrology;  Laterality: N/A;    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 12/1/2023    Procedure: REPAIR, ANEURYSM, ARTERY, FEMORAL;  Surgeon: Yuri Washington MD;  [facility];  Service: Cardiovascular;  Laterality: Right;  Right Femoral Artery Repair    RIGHT HEART CATHETERIZATION Right 10/10/2023    Procedure: INSERTION, CATHETER, RIGHT HEART;  Surgeon: Bin Gandhi MD;  Location: [facility] CATH LAB;  Service: Cardiology;   Laterality: Right;    RIGHT HEART CATHETERIZATION Right 10/13/2023    Procedure: INSERTION, CATHETER, RIGHT HEART;  Surgeon: Walter Mcintyre MD;  Location: [facility] CATH LAB;  Service: Cardiology;  Laterality: Right;    RIGHT HEART CATHETERIZATION  11/13/2023    RIGHT HEART CATHETERIZATION Right 11/13/2023    Procedure: INSERTION, CATHETER, RIGHT HEART;  Surgeon: Juventino Bermudez Jr., MD;  Location: [facility] CATH LAB;  Service: Cardiology;  Laterality: Right;    RIGHT HEART CATHETERIZATION Right 11/20/2023    Procedure: INSERTION, CATHETER, RIGHT HEART;  Surgeon: Sajan Hurley MD;  Location: [facility] CATH LAB;  Service: Cardiology;  Laterality: Right;    RIGHT HEART CATHETERIZATION Right 1/22/2024    Procedure: INSERTION, CATHETER, RIGHT HEART;  Surgeon: Brayan Ocampo MD;  Location: [facility] CATH LAB;  Service: Cardiology;  Laterality: Right;    STERNAL WOUND CLOSURE N/A 12/4/2023    Procedure: CLOSURE, WOUND, STERNUM;  Surgeon: Yuri Washington MD;  Location: [facility] OR 2ND FLR;  Service: Cardiovascular;  Laterality: N/A;    STERNOTOMY N/A 12/1/2023    Procedure: STERNOTOMY, REDO;  Surgeon: Yuri Washington MD;  Location: [facility] OR 2ND FLR;  Service: Cardiovascular;  Laterality: N/A;    VALVULOPLASTY, MITRAL VALVE N/A 12/1/2023    Procedure: VALVULOPLASTY, MITRAL VALVE;  Surgeon: Yuri Washington MD;  Location: [facility] OR 2ND FLR;  Service: Cardiovascular;  Laterality: N/A;       Review of patient's allergies indicates:  No Known Allergies  Current Facility-Administered Medications   Medication Frequency    amiodarone tablet 400 mg Daily    amitriptyline tablet 25 mg Once per day on Monday Wednesday Friday    atorvastatin tablet 40 mg QHS    hydrALAZINE tablet 50 mg Q8H    omega 3-dha-epa-fish oil capsule 1 capsule Daily    oxyCODONE immediate release tablet 5 mg BID PRN    pregabalin capsule 100 mg Every other day    psyllium husk packet 2 packet BID    sevelamer carbonate tablet 800 mg TID WM    trazodone split tablet 25 mg  QHS    vancomycin 125 mg/5 mL oral solution 125 mg Q6H    venlafaxine tablet 37.5 mg Daily    vitamin D 1000 units tablet 1,000 Units Daily    warfarin (COUMADIN) split tablet 1.25 mg Once per day on Sunday Monday Tuesday Wednesday Friday Saturday    [START ON 5/29/2025] warfarin (COUMADIN) tablet 2.5 mg Every Thursday     Family History    None       Tobacco Use    Smoking status: Former     Current packs/day: 0.50     Types: Cigarettes    Smokeless tobacco: Not on file   Substance and Sexual Activity    Alcohol use: Yes     Comment: rarely    Drug use: No    Sexual activity: Not Currently     Partners: Female     Review of Systems   Constitutional:  Negative for fatigue and fever.   Respiratory:  Negative for shortness of breath.    Cardiovascular:  Negative for chest pain, palpitations and leg swelling.   Gastrointestinal:  Positive for diarrhea. Negative for blood in stool.   Skin: Negative.    Neurological:  Positive for weakness.     Objective:     Vital Signs (Most Recent):  Temp: 98.5 °F (36.9 °C) (05/24/25 1125)  Pulse: 69 (05/24/25 1125)  Resp: 18 (05/24/25 1234)  BP: (!) 82/0 (05/24/25 1200)  SpO2: 100 % (05/24/25 1125) Vital Signs (24h Range):  Temp:  [98 °F (36.7 °C)-99 °F (37.2 °C)] 98.5 °F (36.9 °C)  Pulse:  [69-84] 69  Resp:  [10-19] 18  SpO2:  [97 %-100 %] 100 %  BP: ()/(0-83) 82/0     Weight: 86.5 kg (190 lb 11.2 oz) (05/23/25 2330)  Body mass index is 25.16 kg/m².  Body surface area is 2.11 meters squared.    No intake/output data recorded.     Physical Exam  Vitals and nursing note reviewed.   HENT:      Mouth/Throat:      Pharynx: Oropharynx is clear.   Cardiovascular:      Rate and Rhythm: Normal rate.      Comments: LVAD  Pulmonary:      Effort: Pulmonary effort is normal.   Abdominal:      Palpations: Abdomen is soft.   Musculoskeletal:      Right lower leg: No edema.      Left lower leg: No edema.   Neurological:      Mental Status: He is alert and oriented to person, place, and time.    Psychiatric:         Mood and Affect: Mood normal.         Behavior: Behavior normal.          Significant Labs:  CBC:   Recent Labs   Lab 05/24/25  0552   WBC 4.31   RBC 3.11*   HGB 7.5*   HCT 25.6*      MCV 82   MCH 24.1*   MCHC 29.3*     CMP:   Recent Labs   Lab 05/23/25  1402 05/24/25  0552   * 91   CALCIUM 8.0* 7.5*   ALBUMIN 2.4*  --    PROT 7.1  --     138   K 3.4* 3.2*   CO2 25 25    104   BUN 7* 9   CREATININE 3.4* 4.0*   ALKPHOS 94  --    ALT 32  --    AST 31  --    BILITOT 0.5  --      All labs within the past 24 hours have been reviewed.    Assessment/Plan:     Cardiac/Vascular  LVAD (left ventricular assist device) present  -per primary     Renal/  ESRD (end stage renal disease)    High risk mortality in setting of ESRD, heart failure, debility, and co existing co morbidities     ESRD on iHD MWF  [facility] ([facility])  3 hours  EDW:  82.1 kg  [facility] TDC  Last HD prior to presentation 5/23/25.      Plan/Recommendations:  -No emergent need for RRT at this time  -plan to continue 3x week dialysis while IP, next HD on Monday.  -K and phos low this morning, rather than replacing would liberalize his diet.  No need for renal diet unless hyperphosphatemia/hyperkalemia becomes an issue.  -1.5L fluid restrictions  -continue strict I/O's  -hold phos binder until K > 5     Anemia of ESRD  -On Micera as OP q 2 weeks;  defer VALENTIN to his OP dialysis unit  -PRN transfusion per primary team    GI  Diarrhea of presumed infectious origin  - per primary         Thank you for your consult. I will follow-up with patient. Please contact us if you have any additional questions.    Yolanda Cai, RONI  Nephrology  [facility] - Cardiac Intensive Care

## 2025-05-24 NOTE — CONSULTS
[facility] - Cardiac Intensive Care  Infectious Disease  Consult Note    Patient Name: Radha Abbott  MRN: [id number]  Admission Date: 5/23/2025  Hospital Length of Stay: 1 days  Attending Physician: Sajan Hurley, *  Primary Care Provider: Vasu Kong MD     Isolation Status: No active isolations    Patient information was obtained from patient, past medical records, ER records, and primary team.      Inpatient consult to Infectious Diseases  Consult performed by: Layne Robles MD  Consult ordered by: Javed Vivas MD        Assessment/Plan:     GI  Diarrhea of presumed infectious origin  64 yo male with VAD 12/2023 as DT, AICD, ESRD, prior Efaec bacteremia, with recent admission for CDAD on prolonged vanco taper here for diarrhea. Repeat Cdiff test on 5/23 negative. Reported interval worsening of diarrhea after receiving recent fosfomycin. Suspect potential post-infectious diarrhea/IBS.     Recommendation:  -continue PO vanco taper   -if symptoms do not improve in 48-72 hr, stop PO vanco taper and start fidaxomicin 200 mg BID x10d  -encouraged pt to eat yogurt/kefir        Thank you for your consult. Will sign off, please call with questions, new culture data or clinical changes. Above d/w primary team.         Lyane Robles MD  Infectious Disease  [facility] - Cardiac Intensive Care    75 minutes of total time spent on the encounter, which includes face to face time and non-face to face time preparing to see the patient (eg, review of tests), obtaining and/or reviewing separately obtained history, documenting clinical information in the electronic or other health record, independently interpreting results (not separately reported) and communicating results to the patient/family/caregiver, or care coordination (not separately reported). I have reviewed hospital notes from  HTS service and other specialty providers as well as outside medical records. I have also reviewed CBC, CMP/BMP,   cultures and imaging with my interpretation as documented. Patient is high risk of morbidity, on antibiotics requiring intensive monitoring for toxicity.         Subjective:     Principal Problem: <principal problem not specified>    HPI: 66 yo male with VAD 12/2023 as DT, AICD, ESRD, prior Efaec bacteremia, with recent admission for CDAD on prolonged vanco taper here for diarrhea. Repeat Cdiff test on 5/23 negative. He is currently on PO vanc taper. Per chart review, pt was seen in ED on 5/12, given an empiric dose of fosfomycin. Ucx grew PSA, however, pt was asymptomatic. Pt reported his diarrhea did improve with the taper, however, once he received the fosfomycin, he had worsening diarrhea. Denied issues with LVAD/DL site. No fevers or chills, dysuria, or abdominal pain. Reports eating well cooked home made food, denies eating anything undercooked or raw.         Past Medical History:   Diagnosis Date    Aspiration pneumonia of both lungs 06/17/2024    CAD (coronary artery disease)     CHF (congestive heart failure)     Delirium 06/16/2024    Diabetes mellitus     HFrEF (heart failure with reduced ejection fraction)     Hypoglycemia 06/12/2024    ICD (implantable cardioverter-defibrillator) in place     LVAD (left ventricular assist device) present 12/01/2023    MI, old     PAF (paroxysmal atrial fibrillation) 10/11/2023    Stage 4 chronic kidney disease 02/15/2024    Type 2 diabetes mellitus without complication, without long-term current use of insulin 10/07/2023       Past Surgical History:   Procedure Laterality Date    ANGIOPLASTY-VENOUS ARTERY Right 12/1/2023    Procedure: ANGIOPLASTY-VENOUS ARTERY, RIGHT FEMORAL;  Surgeon: Yuri Washington MD;  Location: [facility] OR [address] [place] [address];  Service: Cardiovascular;  Laterality: Right;    AORTIC VALVULOPLASTY N/A 12/1/2023    Procedure: REPAIR, AORTIC VALVE;  Surgeon: Yuri Washington MD;  Location: [facility] OR [address] [place] [address];  Service: Cardiovascular;  Laterality: N/A;    CARDIAC SURGERY       CLOSURE N/A 12/1/2023    Procedure: CLOSURE, TEMPORARY;  Surgeon: Yuri Washington MD;  Location: [facility] OR 2ND FLR;  Service: Cardiovascular;  Laterality: N/A;    DRAINAGE OF PLEURAL EFFUSION  12/4/2023    Procedure: DRAINAGE, PLEURAL EFFUSION;  Surgeon: Yuri Washington MD;  Location: [facility] OR 2ND FLR;  Service: Cardiovascular;;    INSERTION OF GRAFT TO PERICARDIUM  12/4/2023    Procedure: INSERTION, GRAFT, PERICARDIUM;  Surgeon: Yuri Washington MD;  Location: [facility] OR 2ND FLR;  Service: Cardiovascular;;    INSERTION OF INTRA-AORTIC BALLOON ASSIST DEVICE Right 11/21/2023    Procedure: INSERTION, INTRA-AORTIC BALLOON PUMP;  Surgeon: Finn Cohn MD;  Location: [facility] CATH LAB;  Service: Cardiology;  Laterality: Right;    LEFT VENTRICULAR ASSIST DEVICE Left 12/1/2023    Procedure: INSERTION-LEFT VENTRICULAR ASSIST DEVICE;  Surgeon: Yuri Washington MD;  [facility];  Service: Cardiovascular;  Laterality: Left;  REDO STERNOTOMY - REDO SAW NEEDED FOR CASE    LYSIS OF ADHESIONS  12/1/2023    Procedure: LYSIS, ADHESIONS;  Surgeon: Yuri Washington MD;  [facility];  Service: Cardiovascular;;    PLACEMENT OF SWAN ROLANDO CATHETER WITH IMAGING GUIDANCE  11/20/2023    Procedure: INSERTION, CATHETER, SWAN-ROLANDO, WITH IMAGING GUIDANCE;  Surgeon: Sajan Hurley MD;  Location: [facility] CATH LAB;  Service: Cardiology;;    REMOVAL OF TUNNELED CENTRAL VENOUS CATHETER (CVC) N/A 3/1/2024    Procedure: REMOVAL, CATHETER, CENTRAL VENOUS, TUNNELED;  Surgeon: Seble Aguilar MD;  Location: [facility] CATH LAB;  Service: Interventional Nephrology;  Laterality: N/A;    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 12/1/2023    Procedure: REPAIR, ANEURYSM, ARTERY, FEMORAL;  Surgeon: Yuri Washington MD;  Location: [facility] OR 2ND FLR;  Service: Cardiovascular;  Laterality: Right;  Right Femoral Artery Repair    RIGHT HEART CATHETERIZATION Right 10/10/2023    Procedure: INSERTION, CATHETER, RIGHT HEART;  Surgeon: Hiram  Bin LUNA MD;  Location: [facility] CATH LAB;  Service: Cardiology;  Laterality: Right;    RIGHT HEART CATHETERIZATION Right 10/13/2023    Procedure: INSERTION, CATHETER, RIGHT HEART;  Surgeon: Walter Mcintyre MD;  Location: [facility] CATH LAB;  Service: Cardiology;  Laterality: Right;    RIGHT HEART CATHETERIZATION  11/13/2023    RIGHT HEART CATHETERIZATION Right 11/13/2023    Procedure: INSERTION, CATHETER, RIGHT HEART;  Surgeon: Juventino Bermudez Jr., MD;  Location: [facility] CATH LAB;  Service: Cardiology;  Laterality: Right;    RIGHT HEART CATHETERIZATION Right 11/20/2023    Procedure: INSERTION, CATHETER, RIGHT HEART;  Surgeon: Sajan Hurley MD;  Location: [facility] CATH LAB;  Service: Cardiology;  Laterality: Right;    RIGHT HEART CATHETERIZATION Right 1/22/2024    Procedure: INSERTION, CATHETER, RIGHT HEART;  Surgeon: Brayan Ocampo MD;  Location: [facility] CATH LAB;  Service: Cardiology;  Laterality: Right;    STERNAL WOUND CLOSURE N/A 12/4/2023    Procedure: CLOSURE, WOUND, STERNUM;  Surgeon: Yuri Washington MD;  [facility];  Service: Cardiovascular;  Laterality: N/A;    STERNOTOMY N/A 12/1/2023    Procedure: STERNOTOMY, REDO;  Surgeon: Yuri Washington MD;  [facility];  Service: Cardiovascular;  Laterality: N/A;    VALVULOPLASTY, MITRAL VALVE N/A 12/1/2023    Procedure: VALVULOPLASTY, MITRAL VALVE;  Surgeon: Yuri Washington MD;  [facility];  Service: Cardiovascular;  Laterality: N/A;       Review of patient's allergies indicates:  No Known Allergies    Medications:  Medications Prior to Admission   Medication Sig    amiodarone (PACERONE) 400 MG tablet Take 1 tablet (400 mg total) by mouth once daily.    amitriptyline (ELAVIL) 25 MG tablet Take 1 tablet (25 mg total) by mouth 3 (three) times a week.    atorvastatin (LIPITOR) 40 MG tablet Take 1 tablet (40 mg total) by mouth every evening.    hydrALAZINE (APRESOLINE) 50 MG tablet Take 1 tablet (50 mg total) by mouth every 8  (eight) hours.    megestroL (MEGACE) 40 MG Tab Take 40-80 mg by mouth once daily As needed for appetite.    omega 3-dha-epa-fish oil (FISH OIL) 1,000 (120-180) mg Cap Take 1 capsule by mouth once daily.    pregabalin (LYRICA) 100 MG capsule Take 1 capsule by mouth every other day.    pulse oximeter (PULSE OXIMETER) device by Apply Externally route 2 (two) times a day. Use twice daily at 8 AM and 3 PM and record the value in MyChart as directed.    sevelamer carbonate (RENVELA) 800 mg Tab Take 800 mg by mouth 3 (three) times daily with meals.    traZODone (DESYREL) 50 MG tablet Take 0.5 tablets (25 mg total) by mouth every evening.    vancomycin 25 mg/mL oral solution Take 5 mLs (125 mg total) by mouth every 6 (six) hours for 9 days, THEN 5 mLs (125 mg total) 2 (two) times a day for 7 days, THEN 5 mLs (125 mg total) once daily for 7 days, THEN 5 mLs (125 mg total) every other day for 14 days.    venlafaxine (EFFEXOR) 37.5 MG Tab Take 1 tablet (37.5 mg total) by mouth once daily.    vitamin D (VITAMIN D3) 1000 units Tab Take 1 tablet by mouth once daily    warfarin (COUMADIN) 2.5 MG tablet Take 1.25 mg orally daily     Antibiotics (From admission, onward)      Start     Stop Route Frequency Ordered    05/24/25 0600  vancomycin 125 mg/5 mL oral solution 125 mg  (C. difficile Infection (CDI) Treatment Order Panel)         06/07/25 0559 Oral Every 6 hours 05/24/25 0010          Antifungals (From admission, onward)      None          Antivirals (From admission, onward)      None             Immunization History   Administered Date(s) Administered    COVID-19 MRNA, LN-S PF (MODERNA HALF 0.25 ML DOSE) 12/13/2021    COVID-19, MRNA, LN-S, PF (MODERNA FULL 0.5 ML DOSE) 03/18/2021, 04/15/2021    DTaP 06/27/2012    Hepatitis A, Adult 11/17/2023    Hepatitis B (recombinant) Adjuvanted, 2 dose 09/03/2023, 11/17/2023    Influenza - Quadrivalent 11/09/2021    PPD Test 12/22/2023    Pneumococcal Conjugate - 20 Valent 09/03/2023     RSVpreF (Arexvy) 09/03/2023    Tdap 06/27/2012, 09/03/2023    Zoster Recombinant 09/03/2023       Family History    None       Social History     Socioeconomic History    Marital status:    Tobacco Use    Smoking status: Former     Current packs/day: 0.50     Types: Cigarettes   Substance and Sexual Activity    Alcohol use: Yes     Comment: rarely    Drug use: No    Sexual activity: Not Currently     Partners: Female     Social Drivers of Health     Financial Resource Strain: Low Risk  (5/24/2025)    Overall Financial Resource Strain (CARDIA)     Difficulty of Paying Living Expenses: Not hard at all   Food Insecurity: No Food Insecurity (5/24/2025)    Hunger Vital Sign     Worried About Running Out of Food in the Last Year: Never true     Ran Out of Food in the Last Year: Never true   Transportation Needs: Unmet Transportation Needs (5/24/2025)    PRAPARE - Transportation     Lack of Transportation (Medical): No     Lack of Transportation (Non-Medical): Yes   Physical Activity: Unknown (2/29/2024)    Exercise Vital Sign     Days of Exercise per Week: 2 days   Recent Concern: Physical Activity - Inactive (2/29/2024)    Exercise Vital Sign     Days of Exercise per Week: 2 days     Minutes of Exercise per Session: 0 min   Stress: No Stress Concern Present (5/24/2025)    [employer] - Occupational Stress Questionnaire     Feeling of Stress : Not at all   Housing Stability: Low Risk  (5/24/2025)    Housing Stability Vital Sign     Unable to Pay for Housing in the Last Year: No     Homeless in the Last Year: No     Review of Systems   Constitutional:  Negative for chills and fever.   Gastrointestinal:  Positive for diarrhea.   All other systems reviewed and are negative.    Objective:     Vital Signs (Most Recent):  Temp: 98.5 °F (36.9 °C) (05/24/25 1125)  Pulse: 69 (05/24/25 1125)  Resp: 18 (05/24/25 1125)  BP: 104/69 (05/24/25 1125)  SpO2: 100 % (05/24/25 1125) Vital Signs (24h  Range):  Temp:  [97.8 °F (36.6 °C)-99 °F (37.2 °C)] 98.5 °F (36.9 °C)  Pulse:  [69-84] 69  Resp:  [10-19] 18  SpO2:  [97 %-100 %] 100 %  BP: ()/(0-83) 104/69     Weight: 86.5 kg (190 lb 11.2 oz)  Body mass index is 25.16 kg/m².    Estimated Creatinine Clearance: 21.4 mL/min (A) (based on SCr of 4 mg/dL (H)).     Physical Exam  Constitutional:       General: He is not in acute distress.     Appearance: He is not ill-appearing.   Pulmonary:      Effort: Pulmonary effort is normal. No respiratory distress.   Abdominal:      General: There is no distension.      Palpations: Abdomen is soft.      Tenderness: There is no abdominal tenderness.      Comments: LVAD site dressed, c/d/i   Musculoskeletal:      Right lower leg: No edema.      Left lower leg: No edema.   Skin:     General: Skin is warm and dry.      Comments: R HD cath   Neurological:      Mental Status: He is alert and oriented to person, place, and time.          Significant Labs:   Microbiology Results (last 7 days)       Procedure Component Value Units Date/Time    Stool culture [[id number]]     Order Status: Sent Specimen: Stool             Significant Imaging: I have reviewed all pertinent imaging results/findings within the past 24 hours.

## 2025-05-24 NOTE — ASSESSMENT & PLAN NOTE
Stage D HFrEF 2/2 ICM s/p HM3 as DT (12/1/2023). Not decompensated.  Blood thinner: 1.25mg all other days. 2.5mg on Thursdays  Hydral 50mg q8h. Previously had low flow alarms with high maps and anemia    Procedure: Device Interrogation Including analysis of device parameters  Current Settings: Ventricular Assist Device  Review of device function is stable/unstable stable      5/24/2025    12:00 PM 5/24/2025     8:00 AM 5/24/2025     5:13 AM 5/23/2025    11:03 PM 5/11/2025     7:43 AM 5/11/2025     4:49 AM 5/10/2025    11:32 PM   TXP LVAD INTERROGATIONS   Type HeartMate3 HeartMate3 HeartMate3 HeartMate3 HeartMate3 HeartMate3 HeartMate3   Flow 3.5 4.1 2.9 3.3 3.8 3.6 4.1   Speed 5050 5000 5000 5000 5000 5000 5000   PI 6 4.4 9.2 6.6 4.9 5.6 4.4   Power ([place]) 3.3 3.4 3.4 3.3 3.4 3.4 3.4   LSL    4600 4600 4600 4600   Pulsatility Pulse Pulse Pulse Pulse No Pulse No Pulse No Pulse

## 2025-05-24 NOTE — SUBJECTIVE & OBJECTIVE
Past Medical History:   Diagnosis Date    Aspiration pneumonia of both lungs 06/17/2024    CAD (coronary artery disease)     CHF (congestive heart failure)     Delirium 06/16/2024    Diabetes mellitus     HFrEF (heart failure with reduced ejection fraction)     Hypoglycemia 06/12/2024    ICD (implantable cardioverter-defibrillator) in place     LVAD (left ventricular assist device) present 12/01/2023    MI, old     PAF (paroxysmal atrial fibrillation) 10/11/2023    Stage 4 chronic kidney disease 02/15/2024    Type 2 diabetes mellitus without complication, without long-term current use of insulin 10/07/2023       Past Surgical History:   Procedure Laterality Date    ANGIOPLASTY-VENOUS ARTERY Right 12/1/2023    Procedure: ANGIOPLASTY-VENOUS ARTERY, RIGHT FEMORAL;  Surgeon: Yuri Washington MD;  [facility]  Service: Cardiovascular;  Laterality: Right;    AORTIC VALVULOPLASTY N/A 12/1/2023    Procedure: REPAIR, AORTIC VALVE;  Surgeon: Yuri Washington MD;  [facility];  Service: Cardiovascular;  Laterality: N/A;    CARDIAC SURGERY      CLOSURE N/A 12/1/2023    Procedure: CLOSURE, TEMPORARY;  Surgeon: Yuri Washington MD;  Location: [facility] OR [facility] FLR;  Service: Cardiovascular;  Laterality: N/A;    DRAINAGE OF PLEURAL EFFUSION  12/4/2023    Procedure: DRAINAGE, PLEURAL EFFUSION;  Surgeon: Yuri Washington MD;  Location: [facility] OR [facility] FLR;  Service: Cardiovascular;;    INSERTION OF GRAFT TO PERICARDIUM  12/4/2023    Procedure: INSERTION, GRAFT, PERICARDIUM;  Surgeon: Yuri Washington MD;  [facility];  Service: Cardiovascular;;    INSERTION OF INTRA-AORTIC BALLOON ASSIST DEVICE Right 11/21/2023    Procedure: INSERTION, INTRA-AORTIC BALLOON PUMP;  Surgeon: Finn Cohn MD;  Location: [facility] CATH LAB;  Service: Cardiology;  Laterality: Right;    LEFT VENTRICULAR ASSIST DEVICE Left 12/1/2023    Procedure: INSERTION-LEFT VENTRICULAR ASSIST DEVICE;  Surgeon: Yuri Washington MD;   Location: [address] FLR;  Service: Cardiovascular;  Laterality: Left;  REDO STERNOTOMY - REDO SAW NEEDED FOR CASE    LYSIS OF ADHESIONS  12/1/2023    Procedure: LYSIS, ADHESIONS;  Surgeon: Yuri Washington MD;  [address]  Service: Cardiovascular;;    PLACEMENT OF SWAN ROLANDO CATHETER WITH IMAGING GUIDANCE  11/20/2023    Procedure: INSERTION, CATHETER, SWAN-ROLANDO, WITH IMAGING GUIDANCE;  Surgeon: Sajan Hurley MD;  Location: [facility] CATH LAB;  Service: Cardiology;;    REMOVAL OF TUNNELED CENTRAL VENOUS CATHETER (CVC) N/A 3/1/2024    Procedure: REMOVAL, CATHETER, CENTRAL VENOUS, TUNNELED;  Surgeon: Seble Aguilar MD;  Location: [facility] CATH LAB;  Service: Interventional Nephrology;  Laterality: N/A;    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 12/1/2023    Procedure: REPAIR, ANEURYSM, ARTERY, FEMORAL;  Surgeon: Yuri Washington MD;  [address]  Service: Cardiovascular;  Laterality: Right;  Right Femoral Artery Repair    RIGHT HEART CATHETERIZATION Right 10/10/2023    Procedure: INSERTION, CATHETER, RIGHT HEART;  Surgeon: Bin Gandhi MD;  Location: [facility] CATH LAB;  Service: Cardiology;  Laterality: Right;    RIGHT HEART CATHETERIZATION Right 10/13/2023    Procedure: INSERTION, CATHETER, RIGHT HEART;  Surgeon: Walter Mcintyre MD;  Location: [facility] CATH LAB;  Service: Cardiology;  Laterality: Right;    RIGHT HEART CATHETERIZATION  11/13/2023    RIGHT HEART CATHETERIZATION Right 11/13/2023    Procedure: INSERTION, CATHETER, RIGHT HEART;  Surgeon: Juventino Bermudez Jr., MD;  Location: [facility] CATH LAB;  Service: Cardiology;  Laterality: Right;    RIGHT HEART CATHETERIZATION Right 11/20/2023    Procedure: INSERTION, CATHETER, RIGHT HEART;  Surgeon: Sajan Hurley MD;  Location: [facility] CATH LAB;  Service: Cardiology;  Laterality: Right;    RIGHT HEART CATHETERIZATION Right 1/22/2024    Procedure: INSERTION, CATHETER, RIGHT HEART;  Surgeon: Brayan Ocampo MD;  Location: [facility]  LAB;  Service: Cardiology;  Laterality: Right;    STERNAL WOUND CLOSURE N/A 12/4/2023    Procedure: CLOSURE, WOUND, STERNUM;  Surgeon: Yuri Washington MD;  Location: [facility] OR [address] [place] [address];  Service: Cardiovascular;  Laterality: N/A;    STERNOTOMY N/A 12/1/2023    Procedure: STERNOTOMY, REDO;  Surgeon: Yuri Washington MD;  [facility]  Service: Cardiovascular;  Laterality: N/A;    VALVULOPLASTY, MITRAL VALVE N/A 12/1/2023    Procedure: VALVULOPLASTY, MITRAL VALVE;  Surgeon: Yuri Washington MD;  [facility];  Service: Cardiovascular;  Laterality: N/A;       Review of patient's allergies indicates:  No Known Allergies    No current facility-administered medications for this encounter.     Family History    None       Tobacco Use    Smoking status: Former     Current packs/day: 0.50     Types: Cigarettes    Smokeless tobacco: Not on file   Substance and Sexual Activity    Alcohol use: Yes     Comment: rarely    Drug use: No    Sexual activity: Not Currently     Partners: Female     Review of Systems   Constitutional:  Positive for appetite change and fatigue. Negative for chills, diaphoresis, fever and unexpected weight change.   HENT:  Negative for sore throat, tinnitus, trouble swallowing and voice change.    Eyes:  Negative for photophobia, discharge, redness, itching and visual disturbance.   Respiratory:  Negative for apnea, cough, choking and chest tightness.    Cardiovascular:  Negative for chest pain, palpitations and leg swelling.   Gastrointestinal:  Positive for diarrhea. Negative for abdominal distention and abdominal pain.   Endocrine: Negative for heat intolerance, polydipsia and polyphagia.   Genitourinary:  Negative for enuresis, flank pain and frequency.   Musculoskeletal:  Negative for arthralgias, back pain and gait problem.   Skin:  Negative for color change, pallor and rash.   Neurological:  Negative for seizures, syncope and light-headedness.   Psychiatric/Behavioral:   Negative for self-injury, sleep disturbance and suicidal ideas. The patient is not nervous/anxious.      Objective:     Vital Signs (Most Recent):  Temp: 99 °F (37.2 °C) (05/23/25 2330)  Pulse: 81 (05/23/25 2330)  Resp: 18 (05/23/25 2330)  BP: (!) 86/0 (05/23/25 2330)  SpO2: 99 % (05/23/25 2330) Vital Signs (24h Range):  Temp:  [97.8 °F (36.6 °C)-99 °F (37.2 °C)] 99 °F (37.2 °C)  Pulse:  [76-81] 81  Resp:  [10-19] 18  SpO2:  [99 %-100 %] 99 %  BP: (86)/(0) 86/0     Patient Vitals for the past 72 hrs (Last 3 readings):   Weight   05/23/25 2330 86.5 kg (190 lb 11.2 oz)     Body mass index is 25.16 kg/m².    No intake or output data in the 24 hours ending 05/23/25 2340       Physical Exam  Constitutional:       General: He is not in acute distress.     Appearance: Normal appearance. He is not ill-appearing or toxic-appearing.   HENT:      Head: Normocephalic and atraumatic.      Right Ear: External ear normal. There is no impacted cerumen.      Left Ear: External ear normal. There is no impacted cerumen.      Nose: Nose normal. No congestion or rhinorrhea.      Mouth/Throat:      Mouth: Mucous membranes are moist.      Pharynx: Oropharynx is clear. No oropharyngeal exudate or posterior oropharyngeal erythema.   Eyes:      General:         Right eye: No discharge.         Left eye: No discharge.      Extraocular Movements: Extraocular movements intact.      Pupils: Pupils are equal, round, and reactive to light.   Cardiovascular:      Comments: LVAD hum is present.  Clean cannulas.  Pulmonary:      Effort: Pulmonary effort is normal. No respiratory distress.      Breath sounds: Normal breath sounds. No wheezing.   Abdominal:      General: Abdomen is flat. Bowel sounds are normal. There is no distension.      Palpations: Abdomen is soft.      Tenderness: There is no abdominal tenderness.   Musculoskeletal:         General: No swelling or tenderness. Normal range of motion.      Cervical back: Normal range of motion and  "neck supple. No rigidity.   Lymphadenopathy:      Cervical: No cervical adenopathy.   Skin:     General: Skin is warm and dry.      Capillary Refill: Capillary refill takes less than 2 seconds.      Coloration: Skin is not jaundiced.      Findings: No bruising.   Neurological:      General: No focal deficit present.      Mental Status: He is alert and oriented to person, place, and time. Mental status is at baseline.      Cranial Nerves: No cranial nerve deficit.      Motor: No weakness.   Psychiatric:         Mood and Affect: Mood normal.         Behavior: Behavior normal.         Thought Content: Thought content normal.         Judgment: Judgment normal.            Significant Labs:  CBC:  Recent Labs   Lab 05/23/25  1402   WBC 5.00   RBC 3.47*   HGB 8.5*   HCT 28.9*      MCV 83   MCH 24.5*   MCHC 29.4*     BNP:  No results for input(s): "BNP" in the last 168 hours.    Invalid input(s): "BNPTRIAGELBLO"  CMP:  Recent Labs   Lab 05/23/25  1402   *   CALCIUM 8.0*   ALBUMIN 2.4*   PROT 7.1      K 3.4*   CO2 25      BUN 7*   CREATININE 3.4*   ALKPHOS 94   ALT 32   AST 31   BILITOT 0.5      Coagulation:   Recent Labs   Lab 05/20/25  1003 05/22/25  1104 05/23/25  1519   INR 1.6* 2.0* 2.5*   APTT  --   --  37.5*     LDH:  No results for input(s): "LDH" in the last 72 hours.  Microbiology:  Microbiology Results (last 7 days)       ** No results found for the last 168 hours. **            I have reviewed all pertinent labs within the past 24 hours.    Diagnostic Results:  I have reviewed all pertinent imaging results/findings within the past 24 hours.    "

## 2025-05-24 NOTE — ASSESSMENT & PLAN NOTE
64 yo male with VAD 12/2023 as DT, AICD, ESRD, prior Efaec bacteremia, with recent admission for CDAD on prolonged vanco taper here for diarrhea. Repeat Cdiff test on 5/23 negative. Reported interval worsening of diarrhea after receiving recent fosfomycin. Suspect post-infectious diarrhea.     Recommendation:  -continue PO vanco taper   -if symptoms do not improve in 48-72 hr, stop PO vanco taper and start fidaxomicin 200 mg BID x10d  -encouraged pt to eat yogurt/kefir

## 2025-05-24 NOTE — ASSESSMENT & PLAN NOTE
Stage D HFrEF 2/2 ICM s/p HM3 as DT (12/1/2023). Not decompensated.  Blood thinner: 1.25mg all other days. 2.5mg on Thursdays    Procedure: Device Interrogation Including analysis of device parameters  Current Settings: Ventricular Assist Device  Review of device function is stable/unstable stable      5/23/2025    11:03 PM 5/11/2025     7:43 AM 5/11/2025     4:49 AM 5/10/2025    11:32 PM 5/10/2025     8:46 PM 5/10/2025     3:42 PM 5/10/2025    12:18 PM   TXP LVAD INTERROGATIONS   Type HeartMate3 HeartMate3 HeartMate3 HeartMate3 HeartMate3 HeartMate3 HeartMate3   Flow 3.3 3.8 3.6 4.1 3.4 3 3.2   Speed 5000 5000 5000 5000 5000 5000 5000   PI 6.6 4.9 5.6 4.4 7 5.3 7.7   Power ([place]) 3.3 3.4 3.4 3.4 3.4 3.4 3.4   LSL 4600 4600 4600 4600 4600 4600 4600   Pulsatility Pulse No Pulse No Pulse No Pulse No Pulse  No Pulse

## 2025-05-24 NOTE — HPI
64 y/o AAM w/ PMHx of stage D HFrEF 2/2 ICM s/p HM3 as DT (12/1/2023), ESRD on HD (MWF), and debility who presented to the emergency department due to diarrhea. Recent admission for CDAD on prolonged vanco taper here for diarrhea. Last HD prior to presentation was yesterday 5/23. Nephrology consulted for ESRD.

## 2025-05-24 NOTE — ASSESSMENT & PLAN NOTE
Received 1 unit pRBC in early May for hgb to 6.8. Hgb is now 8.5 and stable.   His INR is 2 and no signs of acute bleeding  CTM

## 2025-05-24 NOTE — NURSING
Patient admitted to CSU. Patient arrived to floor from [place] no evidence of distress; patient AAO x4 at this time. Patient placed on tele. Vital signs obtained. Patient voices no complaints at this time. Plan of care initiated with patient. On call VAD coordinator notified of pt admission,on call MD Durand notified of pt admission, came to bedside to speak with the pt. Bed in lowest position, locked, SR up x2, call bell in reach.     Nurses Note -- 4 Eyes      5/24/2025   12:05 AM      Skin assessed during: Admit      [x] No Altered Skin Integrity Present    [x]Prevention Measures Documented      [] Yes- Altered Skin Integrity Present or Discovered   [] LDA Added if Not in Epic (Describe Wound)   [] New Altered Skin Integrity was Present on Admit and Documented in LDA   [] Wound Image Taken    Wound Care Consulted? No    Attending Nurse:  Shavonne Adrian RN     Second RN/Staff Member:  Juliet CARD

## 2025-05-24 NOTE — SUBJECTIVE & OBJECTIVE
Past Medical History:   Diagnosis Date    Aspiration pneumonia of both lungs 06/17/2024    CAD (coronary artery disease)     CHF (congestive heart failure)     Delirium 06/16/2024    Diabetes mellitus     HFrEF (heart failure with reduced ejection fraction)     Hypoglycemia 06/12/2024    ICD (implantable cardioverter-defibrillator) in place     LVAD (left ventricular assist device) present 12/01/2023    MI, old     PAF (paroxysmal atrial fibrillation) 10/11/2023    Stage 4 chronic kidney disease 02/15/2024    Type 2 diabetes mellitus without complication, without long-term current use of insulin 10/07/2023       Past Surgical History:   Procedure Laterality Date    ANGIOPLASTY-VENOUS ARTERY Right 12/1/2023    Procedure: ANGIOPLASTY-VENOUS ARTERY, RIGHT FEMORAL;  Surgeon: Yuri Washington MD;  [facility];  Service: Cardiovascular;  Laterality: Right;    AORTIC VALVULOPLASTY N/A 12/1/2023    Procedure: REPAIR, AORTIC VALVE;  Surgeon: Yuri Washington MD;  [facility];  Service: Cardiovascular;  Laterality: N/A;    CARDIAC SURGERY      CLOSURE N/A 12/1/2023    Procedure: CLOSURE, TEMPORARY;  Surgeon: Yuri Washington MD;  Location: [facility] OR [facility] FLR;  Service: Cardiovascular;  Laterality: N/A;    DRAINAGE OF PLEURAL EFFUSION  12/4/2023    Procedure: DRAINAGE, PLEURAL EFFUSION;  Surgeon: Yuri Washington MD;  Location: [facility] OR [facility] FLR;  Service: Cardiovascular;;    INSERTION OF GRAFT TO PERICARDIUM  12/4/2023    Procedure: INSERTION, GRAFT, PERICARDIUM;  Surgeon: Yuri Washington MD;  [facility];  Service: Cardiovascular;;    INSERTION OF INTRA-AORTIC BALLOON ASSIST DEVICE Right 11/21/2023    Procedure: INSERTION, INTRA-AORTIC BALLOON PUMP;  Surgeon: Finn Cohn MD;  Location: [facility] CATH LAB;  Service: Cardiology;  Laterality: Right;    LEFT VENTRICULAR ASSIST DEVICE Left 12/1/2023    Procedure: INSERTION-LEFT VENTRICULAR ASSIST DEVICE;  Surgeon: Yuri Washington MD;   Location: [address] FLR;  Service: Cardiovascular;  Laterality: Left;  REDO STERNOTOMY - REDO SAW NEEDED FOR CASE    LYSIS OF ADHESIONS  12/1/2023    Procedure: LYSIS, ADHESIONS;  Surgeon: Yuri Washington MD;  [address];  Service: Cardiovascular;;    PLACEMENT OF SWAN ROLANDO CATHETER WITH IMAGING GUIDANCE  11/20/2023    Procedure: INSERTION, CATHETER, SWAN-ROLANDO, WITH IMAGING GUIDANCE;  Surgeon: Sajan Hurley MD;  Location: [facility] CATH LAB;  Service: Cardiology;;    REMOVAL OF TUNNELED CENTRAL VENOUS CATHETER (CVC) N/A 3/1/2024    Procedure: REMOVAL, CATHETER, CENTRAL VENOUS, TUNNELED;  Surgeon: Seble Aguilar MD;  Location: [facility] CATH LAB;  Service: Interventional Nephrology;  Laterality: N/A;    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 12/1/2023    Procedure: REPAIR, ANEURYSM, ARTERY, FEMORAL;  Surgeon: Yuri Washington MD;  [address]  Service: Cardiovascular;  Laterality: Right;  Right Femoral Artery Repair    RIGHT HEART CATHETERIZATION Right 10/10/2023    Procedure: INSERTION, CATHETER, RIGHT HEART;  Surgeon: Bin Gandhi MD;  Location: [facility] CATH LAB;  Service: Cardiology;  Laterality: Right;    RIGHT HEART CATHETERIZATION Right 10/13/2023    Procedure: INSERTION, CATHETER, RIGHT HEART;  Surgeon: Walter Mcintyre MD;  Location: [facility] CATH LAB;  Service: Cardiology;  Laterality: Right;    RIGHT HEART CATHETERIZATION  11/13/2023    RIGHT HEART CATHETERIZATION Right 11/13/2023    Procedure: INSERTION, CATHETER, RIGHT HEART;  Surgeon: Juventino Bermudez Jr., MD;  Location: [facility] CATH LAB;  Service: Cardiology;  Laterality: Right;    RIGHT HEART CATHETERIZATION Right 11/20/2023    Procedure: INSERTION, CATHETER, RIGHT HEART;  Surgeon: Sajan Hurley MD;  Location: [facility] CATH LAB;  Service: Cardiology;  Laterality: Right;    RIGHT HEART CATHETERIZATION Right 1/22/2024    Procedure: INSERTION, CATHETER, RIGHT HEART;  Surgeon: Brayan Ocampo MD;  Location: [facility]  LAB;  Service: Cardiology;  Laterality: Right;    STERNAL WOUND CLOSURE N/A 12/4/2023    Procedure: CLOSURE, WOUND, STERNUM;  Surgeon: Yuri Washington MD;  Location: [facility] OR 2ND FLR;  Service: Cardiovascular;  Laterality: N/A;    STERNOTOMY N/A 12/1/2023    Procedure: STERNOTOMY, REDO;  Surgeon: Yuri Washington MD;  Location: [facility] OR [facility] FLR;  Service: Cardiovascular;  Laterality: N/A;    VALVULOPLASTY, MITRAL VALVE N/A 12/1/2023    Procedure: VALVULOPLASTY, MITRAL VALVE;  Surgeon: Yuri Washington MD;  Location: [facility] OR [facility] FLR;  Service: Cardiovascular;  Laterality: N/A;       Review of patient's allergies indicates:  No Known Allergies    Medications:  Medications Prior to Admission   Medication Sig    amiodarone (PACERONE) 400 MG tablet Take 1 tablet (400 mg total) by mouth once daily.    amitriptyline (ELAVIL) 25 MG tablet Take 1 tablet (25 mg total) by mouth 3 (three) times a week.    atorvastatin (LIPITOR) 40 MG tablet Take 1 tablet (40 mg total) by mouth every evening.    hydrALAZINE (APRESOLINE) 50 MG tablet Take 1 tablet (50 mg total) by mouth every 8 (eight) hours.    megestroL (MEGACE) 40 MG Tab Take 40-80 mg by mouth once daily As needed for appetite.    omega 3-dha-epa-fish oil (FISH OIL) 1,000 (120-180) mg Cap Take 1 capsule by mouth once daily.    pregabalin (LYRICA) 100 MG capsule Take 1 capsule by mouth every other day.    pulse oximeter (PULSE OXIMETER) device by Apply Externally route 2 (two) times a day. Use twice daily at 8 AM and 3 PM and [facility] as directed.    sevelamer carbonate (RENVELA) 800 mg Tab Take 800 mg by mouth 3 (three) times daily with meals.    traZODone (DESYREL) 50 MG tablet Take 0.5 tablets (25 mg total) by mouth every evening.    vancomycin 25 mg/mL oral solution Take 5 mLs (125 mg total) by mouth every 6 (six) hours for 9 days, THEN 5 mLs (125 mg total) 2 (two) times a day for 7 days, THEN 5 mLs (125 mg total) once daily for 7 days, THEN 5 mLs (125 mg  total) every other day for 14 days.    venlafaxine (EFFEXOR) 37.5 MG Tab Take 1 tablet (37.5 mg total) by mouth once daily.    vitamin D (VITAMIN D3) 1000 units Tab Take 1 tablet by mouth once daily    warfarin (COUMADIN) 2.5 MG tablet Take 1.25 mg orally daily     Antibiotics (From admission, onward)      Start     Stop Route Frequency Ordered    05/24/25 0600  vancomycin 125 mg/5 mL oral solution 125 mg  (C. difficile Infection (CDI) Treatment Order Panel)         06/07/25 0559 Oral Every 6 hours 05/24/25 0010          Antifungals (From admission, onward)      None          Antivirals (From admission, onward)      None             Immunization History   Administered Date(s) Administered    COVID-19 MRNA, LN-S PF (MODERNA HALF 0.25 ML DOSE) 12/13/2021    COVID-19, MRNA, LN-S, PF (MODERNA FULL 0.5 ML DOSE) 03/18/2021, 04/15/2021    DTaP 06/27/2012    Hepatitis A, Adult 11/17/2023    Hepatitis B (recombinant) Adjuvanted, 2 dose 09/03/2023, 11/17/2023    Influenza - Quadrivalent 11/09/2021    PPD Test 12/22/2023    Pneumococcal Conjugate - 20 Valent 09/03/2023    RSVpreF (Arexvy) 09/03/2023    Tdap 06/27/2012, 09/03/2023    Zoster Recombinant 09/03/2023       Family History    None       Social History     Socioeconomic History    Marital status:    Tobacco Use    Smoking status: Former     Current packs/day: 0.50     Types: Cigarettes   Substance and Sexual Activity    Alcohol use: Yes     Comment: rarely    Drug use: No    Sexual activity: Not Currently     Partners: Female     Social Drivers of Health     Financial Resource Strain: Low Risk  (5/24/2025)    Overall Financial Resource Strain (CARDIA)     Difficulty of Paying Living Expenses: Not hard at all   Food Insecurity: No Food Insecurity (5/24/2025)    Hunger Vital Sign     Worried About Running Out of Food in the Last Year: Never true     Ran Out of Food in the Last Year: Never true   Transportation Needs: Unmet Transportation Needs (5/24/2025)     PRAPARE - Transportation     Lack of Transportation (Medical): No     Lack of Transportation (Non-Medical): Yes   Physical Activity: Unknown (2/29/2024)    Exercise Vital Sign     Days of Exercise per Week: 2 days   Recent Concern: Physical Activity - Inactive (2/29/2024)    Exercise Vital Sign     Days of Exercise per Week: 2 days     Minutes of Exercise per Session: 0 min   Stress: No Stress Concern Present (5/24/2025)    [employer] - Occupational Stress Questionnaire     Feeling of Stress : Not at all   Housing Stability: Low Risk  (5/24/2025)    Housing Stability Vital Sign     Unable to Pay for Housing in the Last Year: No     Homeless in the Last Year: No     Review of Systems   Constitutional:  Negative for chills and fever.   Gastrointestinal:  Positive for diarrhea.   All other systems reviewed and are negative.    Objective:     Vital Signs (Most Recent):  Temp: 98.5 °F (36.9 °C) (05/24/25 1125)  Pulse: 69 (05/24/25 1125)  Resp: 18 (05/24/25 1125)  BP: 104/69 (05/24/25 1125)  SpO2: 100 % (05/24/25 1125) Vital Signs (24h Range):  Temp:  [97.8 °F (36.6 °C)-99 °F (37.2 °C)] 98.5 °F (36.9 °C)  Pulse:  [69-84] 69  Resp:  [10-19] 18  SpO2:  [97 %-100 %] 100 %  BP: ()/(0-83) 104/69     Weight: 86.5 kg (190 lb 11.2 oz)  Body mass index is 25.16 kg/m².    Estimated Creatinine Clearance: 21.4 mL/min (A) (based on SCr of 4 mg/dL (H)).     Physical Exam  Constitutional:       General: He is not in acute distress.     Appearance: He is not ill-appearing.   Pulmonary:      Effort: Pulmonary effort is normal. No respiratory distress.   Abdominal:      General: There is no distension.      Palpations: Abdomen is soft.      Tenderness: There is no abdominal tenderness.      Comments: LVAD site dressed, c/d/i   Musculoskeletal:      Right lower leg: No edema.      Left lower leg: No edema.   Skin:     General: Skin is warm and dry.      Comments: R HD cath   Neurological:      Mental  Status: He is alert and oriented to person, place, and time.          Significant Labs:   Microbiology Results (last 7 days)       Procedure Component Value Units Date/Time    Stool culture [[id number]]     Order Status: Sent Specimen: Stool             Significant Imaging: I have reviewed all pertinent imaging results/findings within the past 24 hours.

## 2025-05-24 NOTE — PLAN OF CARE
Problem: Adult Inpatient Plan of Care  Goal: Plan of Care Review  Outcome: Progressing  Flowsheets (Taken 5/24/2025 1829)  Plan of Care Reviewed With: patient  Goal: Patient-Specific Goal (Individualized)  Outcome: Progressing  Goal: Absence of Hospital-Acquired Illness or Injury  Outcome: Progressing  Intervention: Identify and Manage Fall Risk  Flowsheets (Taken 5/24/2025 1829)  Safety Promotion/Fall Prevention:   commode/urinal/bedpan at bedside   Fall Risk signage in place   lighting adjusted   medications reviewed   nonskid shoes/socks when out of bed   side rails raised x 2  Intervention: Prevent Skin Injury  Flowsheets (Taken 5/24/2025 1829)  Body Position: position changed independently  Skin Protection: incontinence pads utilized  Device Skin Pressure Protection: absorbent pad utilized/changed  Intervention: Prevent and Manage VTE (Venous Thromboembolism) Risk  Flowsheets (Taken 5/24/2025 1829)  VTE Prevention/Management:   ambulation promoted   bleeding precautions maintained  Intervention: Prevent Infection  Flowsheets (Taken 5/24/2025 1829)  Infection Prevention:   environmental surveillance performed   equipment surfaces disinfected   hand hygiene promoted   personal protective equipment utilized   single patient room provided  Goal: Optimal Comfort and Wellbeing  Outcome: Progressing  Intervention: Monitor Pain and Promote Comfort  Flowsheets (Taken 5/24/2025 1829)  Pain Management Interventions:   care clustered   medication offered  Intervention: Provide Person-Centered Care  Flowsheets (Taken 5/24/2025 1829)  Trust Relationship/Rapport:   care explained   emotional support provided   thoughts/feelings acknowledged  Goal: Readiness for Transition of Care  Outcome: Progressing  Intervention: Mutually Develop Transition Plan  Flowsheets (Taken 5/24/2025 1829)  Transportation Anticipated: family or friend will provide

## 2025-05-24 NOTE — PLAN OF CARE
Problem: Adult Inpatient Plan of Care  Goal: Absence of Hospital-Acquired Illness or Injury  Outcome: Progressing  Goal: Optimal Comfort and Wellbeing  Outcome: Progressing  Goal: Readiness for Transition of Care  Outcome: Progressing     Problem: Diabetes Comorbidity  Goal: Blood Glucose Level Within Targeted Range  Outcome: Progressing     Problem: Infection  Goal: Absence of Infection Signs and Symptoms  Outcome: Progressing     Problem: Ventricular Assist Device  Goal: Optimal Adjustment to Device  Outcome: Progressing  Goal: Absence of Bleeding  Outcome: Progressing  Goal: Absence of Embolism Signs and Symptoms  Outcome: Progressing  Goal: Optimal Blood Flow  Outcome: Progressing  Goal: Absence of Infection Signs and Symptoms  Outcome: Progressing  Goal: Effective Right-Sided Heart Function  Outcome: Progressing     Problem: Fall Injury Risk  Goal: Absence of Fall and Fall-Related Injury  Outcome: Progressing     Problem: Skin Injury Risk Increased  Goal: Skin Health and Integrity  Outcome: Progressing

## 2025-05-24 NOTE — SUBJECTIVE & OBJECTIVE
Interval History: pt and wife reporting he continues to have diarrhea. Unfortunatley diarrhea improved originally for a few days with solid BM prior to last d/c and restarted once pt went home/got treatment for UTI. Continued on vancomycin taper. He reports no diarrhea today and denies any melena/hematochezia, but feels like his buttocks is very raw and painful from so much diarrhea.     Continuous Infusions:  Scheduled Meds:   amiodarone  400 mg Oral Daily    amitriptyline  25 mg Oral Once per day on Monday Wednesday Friday    atorvastatin  40 mg Oral QHS    hydrALAZINE  50 mg Oral Q8H    omega 3-dha-epa-fish oil  1 capsule Oral Daily    pregabalin  100 mg Oral Every other day    psyllium husk  2 packet Oral BID    sevelamer carbonate  800 mg Oral TID WM    traZODone  25 mg Oral QHS    vancomycin  125 mg Oral Q6H    venlafaxine  37.5 mg Oral Daily    vitamin D  1,000 Units Oral Daily    warfarin  1.25 mg Oral Once per day on Sunday Monday Tuesday Wednesday Friday Saturday    [START ON 5/29/2025] warfarin  2.5 mg Oral Every Thursday     PRN Meds:  Current Facility-Administered Medications:     oxyCODONE, 5 mg, Oral, BID PRN    Review of patient's allergies indicates:  No Known Allergies  Objective:     Vital Signs (Most Recent):  Temp: 98.5 °F (36.9 °C) (05/24/25 1125)  Pulse: 69 (05/24/25 1125)  Resp: 18 (05/24/25 1234)  BP: (!) 82/0 (05/24/25 1200)  SpO2: 100 % (05/24/25 1125) Vital Signs (24h Range):  Temp:  [98 °F (36.7 °C)-99 °F (37.2 °C)] 98.5 °F (36.9 °C)  Pulse:  [69-84] 69  Resp:  [10-18] 18  SpO2:  [97 %-100 %] 100 %  BP: ()/(0-83) 82/0     Patient Vitals for the past 72 hrs (Last 3 readings):   Weight   05/23/25 2330 86.5 kg (190 lb 11.2 oz)     Body mass index is 25.16 kg/m².      Intake/Output Summary (Last 24 hours) at 5/24/2025 1404  Last data filed at 5/24/2025 1312  Gross per 24 hour   Intake --   Output 200 ml   Net -200 ml       Hemodynamic Parameters:       Telemetry: reviewed      "  Physical Exam  Vitals and nursing note reviewed.   Constitutional:       Appearance: He is normal weight.   HENT:      Head: Normocephalic and atraumatic.      Right Ear: External ear normal.      Left Ear: External ear normal.      Nose: Nose normal.      Mouth/Throat:      Mouth: Mucous membranes are moist.      Pharynx: Oropharynx is clear.   Eyes:      Conjunctiva/sclera: Conjunctivae normal.   Cardiovascular:      Pulses: Normal pulses.      Heart sounds: Normal heart sounds.      Comments: Nl vad hum  Pulmonary:      Effort: Pulmonary effort is normal.      Breath sounds: Normal breath sounds.   Abdominal:      General: Abdomen is flat. Bowel sounds are normal.      Palpations: Abdomen is soft.   Musculoskeletal:      Cervical back: Normal range of motion.      Right lower leg: No edema.      Left lower leg: No edema.   Neurological:      Mental Status: He is alert.            Significant Labs:  CBC:  Recent Labs   Lab 05/23/25  1402 05/24/25  0552   WBC 5.00 4.31   RBC 3.47* 3.11*   HGB 8.5* 7.5*   HCT 28.9* 25.6*    198   MCV 83 82   MCH 24.5* 24.1*   MCHC 29.4* 29.3*     BNP:  No results for input(s): "BNP" in the last 168 hours.    Invalid input(s): "BNPTRIAGELBLO"  CMP:  Recent Labs   Lab 05/23/25  1402 05/24/25  0552   * 91   CALCIUM 8.0* 7.5*   ALBUMIN 2.4*  --    PROT 7.1  --     138   K 3.4* 3.2*   CO2 25 25    104   BUN 7* 9   CREATININE 3.4* 4.0*   ALKPHOS 94  --    ALT 32  --    AST 31  --    BILITOT 0.5  --       Coagulation:   Recent Labs   Lab 05/22/25  1104 05/23/25  1519 05/24/25  0552   INR 2.0* 2.5* 3.0*   APTT  --  37.5*  --      LDH:  No results for input(s): "LDH" in the last 72 hours.  Microbiology:  Microbiology Results (last 7 days)       Procedure Component Value Units Date/Time    Stool culture [[id number]]     Order Status: Sent Specimen: Stool             I have reviewed all pertinent labs within the past 24 hours.    Estimated Creatinine Clearance: 21.4 " mL/min (A) (based on SCr of 4 mg/dL (H)).    Diagnostic Results:  I have reviewed and interpreted all pertinent imaging results/findings within the past 24 hours.

## 2025-05-24 NOTE — H&P
[facility] - Cardiac Intensive Care  Heart Transplant  H&P    Patient Name: Radha Abbott  MRN: [id number]  Admission Date: 5/23/2025  Attending Physician: Sajan Hurley, *  Primary Care Provider: Vasu Kong MD  Principal Problem:<principal problem not specified>    Subjective:     History of Present Illness:  64M PMHx of stage D HFrEF 2/2 ICM s/p HM3 as DT (12/1/2023), ESRD on HD (MWF), and debility here for ongoing diarrhea after treatment of c. difficile infection. Pertinent history below:    5/9-11: Patient was recently hospitalized for anemia and C-difficile infection. At that time patient received a unit of blood, hydrated for low flow alarms, hydralazine given to target MAPs to 80s with Hydral 50mg TID, then put on extended vancomycin course with taper for discharge on 5/11  5 mLs (125 mg total) q6 hours for 9 days,   5 mLs (125 mg total) q12h day for 7 days,   5 mLs (125 mg total) qdaily for 7 days,   5 mLs (125 mg total) every other day for 14 days.    5/12: Then presented for UTI symptoms for which he took Fosfomycin. The Urine cultures grew pseudomonas aeruginosa which ID recommended to forego further treatment if no longer having UTI symptoms.    5/23/25: Patient reports today for ongoing weakness and diarrhea. States that he has been taking the vancomycin and has been on the 125mg q12h dosage. He had C. diff toxin and antigen collected which both resulted negative. Otherwise his labwork has been stable with wbc 5.0, hgb 8.5, borderline K 3.4, INR 2.0. At the outside hospital he was given IV metronidazole 500mg and oral vancomycin 500mg once. He has been taking loperamide every 4 hours, and had one dose of metamucil.       Past Medical History:   Diagnosis Date    Aspiration pneumonia of both lungs 06/17/2024    CAD (coronary artery disease)     CHF (congestive heart failure)     Delirium 06/16/2024    Diabetes mellitus     HFrEF (heart failure with reduced ejection fraction)      Hypoglycemia 06/12/2024    ICD (implantable cardioverter-defibrillator) in place     LVAD (left ventricular assist device) present 12/01/2023    MI, old     PAF (paroxysmal atrial fibrillation) 10/11/2023    Stage 4 chronic kidney disease 02/15/2024    Type 2 diabetes mellitus without complication, without long-term current use of insulin 10/07/2023       Past Surgical History:   Procedure Laterality Date    ANGIOPLASTY-VENOUS ARTERY Right 12/1/2023    Procedure: ANGIOPLASTY-VENOUS ARTERY, RIGHT FEMORAL;  Surgeon: Yuri Washington MD;  [facility];  Service: Cardiovascular;  Laterality: Right;    AORTIC VALVULOPLASTY N/A 12/1/2023    Procedure: REPAIR, AORTIC VALVE;  Surgeon: Yuri Washington MD;  [facility];  Service: Cardiovascular;  Laterality: N/A;    CARDIAC SURGERY      CLOSURE N/A 12/1/2023    Procedure: CLOSURE, TEMPORARY;  Surgeon: Yuri Washington MD;  [facility];  Service: Cardiovascular;  Laterality: N/A;    DRAINAGE OF PLEURAL EFFUSION  12/4/2023    Procedure: DRAINAGE, PLEURAL EFFUSION;  Surgeon: Yuri Washington MD;  [facility];  Service: Cardiovascular;;    INSERTION OF GRAFT TO PERICARDIUM  12/4/2023    Procedure: INSERTION, GRAFT, PERICARDIUM;  Surgeon: Yuri Washington MD;  [facility];  Service: Cardiovascular;;    INSERTION OF INTRA-AORTIC BALLOON ASSIST DEVICE Right 11/21/2023    Procedure: INSERTION, INTRA-AORTIC BALLOON PUMP;  Surgeon: Finn Cohn MD;  Location: [facility] CATH LAB;  Service: Cardiology;  Laterality: Right;    LEFT VENTRICULAR ASSIST DEVICE Left 12/1/2023    Procedure: INSERTION-LEFT VENTRICULAR ASSIST DEVICE;  Surgeon: Yuri Washington MD;  Location: [facility];  Service: Cardiovascular;  Laterality: Left;  REDO STERNOTOMY - REDO SAW NEEDED FOR CASE    LYSIS OF ADHESIONS  12/1/2023    Procedure: LYSIS, ADHESIONS;  Surgeon: Yuri Washington MD;  Location: [facility];  Service: Cardiovascular;;     PLACEMENT OF SWAN ROLANDO CATHETER WITH IMAGING GUIDANCE  11/20/2023    Procedure: INSERTION, CATHETER, SWAN-ROLANDO, WITH IMAGING GUIDANCE;  Surgeon: Sajan Hurley MD;  Location: [facility] CATH LAB;  Service: Cardiology;;    REMOVAL OF TUNNELED CENTRAL VENOUS CATHETER (CVC) N/A 3/1/2024    Procedure: REMOVAL, CATHETER, CENTRAL VENOUS, TUNNELED;  Surgeon: Seble Aguilar MD;  Location: [facility] CATH LAB;  Service: Interventional Nephrology;  Laterality: N/A;    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 12/1/2023    Procedure: REPAIR, ANEURYSM, ARTERY, FEMORAL;  Surgeon: Yuri Washington MD;  Location: [facility] OR [address] [place] [address];  Service: Cardiovascular;  Laterality: Right;  Right Femoral Artery Repair    RIGHT HEART CATHETERIZATION Right 10/10/2023    Procedure: INSERTION, CATHETER, RIGHT HEART;  Surgeon: Bin Gandhi MD;  Location: [facility] CATH LAB;  Service: Cardiology;  Laterality: Right;    RIGHT HEART CATHETERIZATION Right 10/13/2023    Procedure: INSERTION, CATHETER, RIGHT HEART;  Surgeon: Walter Mcintyre MD;  Location: [facility] CATH LAB;  Service: Cardiology;  Laterality: Right;    RIGHT HEART CATHETERIZATION  11/13/2023    RIGHT HEART CATHETERIZATION Right 11/13/2023    Procedure: INSERTION, CATHETER, RIGHT HEART;  Surgeon: Juventino Bermudez Jr., MD;  Location: [facility] CATH LAB;  Service: Cardiology;  Laterality: Right;    RIGHT HEART CATHETERIZATION Right 11/20/2023    Procedure: INSERTION, CATHETER, RIGHT HEART;  Surgeon: Sajan Hurley MD;  Location: [facility] CATH LAB;  Service: Cardiology;  Laterality: Right;    RIGHT HEART CATHETERIZATION Right 1/22/2024    Procedure: INSERTION, CATHETER, RIGHT HEART;  Surgeon: Brayan Ocampo MD;  Location: [facility] CATH LAB;  Service: Cardiology;  Laterality: Right;    STERNAL WOUND CLOSURE N/A 12/4/2023    Procedure: CLOSURE, WOUND, STERNUM;  Surgeon: Yuri Washington MD;  [facility];  Service: Cardiovascular;  Laterality: N/A;    STERNOTOMY N/A 12/1/2023    Procedure:  STERNOTOMY, REDO;  Surgeon: Yuri Washington MD;  Location: [facility] OR [address] [place] [address];  Service: Cardiovascular;  Laterality: N/A;    VALVULOPLASTY, MITRAL VALVE N/A 12/1/2023    Procedure: VALVULOPLASTY, MITRAL VALVE;  Surgeon: Yuri Washington MD;  Location: [facility] OR [address] [place] [address];  Service: Cardiovascular;  Laterality: N/A;       Review of patient's allergies indicates:  No Known Allergies    No current facility-administered medications for this encounter.     Family History    None       Tobacco Use    Smoking status: Former     Current packs/day: 0.50     Types: Cigarettes    Smokeless tobacco: Not on file   Substance and Sexual Activity    Alcohol use: Yes     Comment: rarely    Drug use: No    Sexual activity: Not Currently     Partners: Female     Review of Systems   Constitutional:  Positive for appetite change and fatigue. Negative for chills, diaphoresis, fever and unexpected weight change.   HENT:  Negative for sore throat, tinnitus, trouble swallowing and voice change.    Eyes:  Negative for photophobia, discharge, redness, itching and visual disturbance.   Respiratory:  Negative for apnea, cough, choking and chest tightness.    Cardiovascular:  Negative for chest pain, palpitations and leg swelling.   Gastrointestinal:  Positive for diarrhea. Negative for abdominal distention and abdominal pain.   Endocrine: Negative for heat intolerance, polydipsia and polyphagia.   Genitourinary:  Negative for enuresis, flank pain and frequency.   Musculoskeletal:  Negative for arthralgias, back pain and gait problem.   Skin:  Negative for color change, pallor and rash.   Neurological:  Negative for seizures, syncope and light-headedness.   Psychiatric/Behavioral:  Negative for self-injury, sleep disturbance and suicidal ideas. The patient is not nervous/anxious.      Objective:     Vital Signs (Most Recent):  Temp: 99 °F (37.2 °C) (05/23/25 2330)  Pulse: 81 (05/23/25 2330)  Resp: 18 (05/23/25 2330)  BP: (!) 86/0 (05/23/25 2330)  SpO2:  99 % (05/23/25 2330) Vital Signs (24h Range):  Temp:  [97.8 °F (36.6 °C)-99 °F (37.2 °C)] 99 °F (37.2 °C)  Pulse:  [76-81] 81  Resp:  [10-19] 18  SpO2:  [99 %-100 %] 99 %  BP: (86)/(0) 86/0     Patient Vitals for the past 72 hrs (Last 3 readings):   Weight   05/23/25 2330 86.5 kg (190 lb 11.2 oz)     Body mass index is 25.16 kg/m².    No intake or output data in the 24 hours ending 05/23/25 2340       Physical Exam  Constitutional:       General: He is not in acute distress.     Appearance: Normal appearance. He is not ill-appearing or toxic-appearing.   HENT:      Head: Normocephalic and atraumatic.      Right Ear: External ear normal. There is no impacted cerumen.      Left Ear: External ear normal. There is no impacted cerumen.      Nose: Nose normal. No congestion or rhinorrhea.      Mouth/Throat:      Mouth: Mucous membranes are moist.      Pharynx: Oropharynx is clear. No oropharyngeal exudate or posterior oropharyngeal erythema.   Eyes:      General:         Right eye: No discharge.         Left eye: No discharge.      Extraocular Movements: Extraocular movements intact.      Pupils: Pupils are equal, round, and reactive to light.   Cardiovascular:      Comments: LVAD hum is present.  Clean cannulas.  Pulmonary:      Effort: Pulmonary effort is normal. No respiratory distress.      Breath sounds: Normal breath sounds. No wheezing.   Abdominal:      General: Abdomen is flat. Bowel sounds are normal. There is no distension.      Palpations: Abdomen is soft.      Tenderness: There is no abdominal tenderness.   Musculoskeletal:         General: No swelling or tenderness. Normal range of motion.      Cervical back: Normal range of motion and neck supple. No rigidity.   Lymphadenopathy:      Cervical: No cervical adenopathy.   Skin:     General: Skin is warm and dry.      Capillary Refill: Capillary refill takes less than 2 seconds.      Coloration: Skin is not jaundiced.      Findings: No bruising.   Neurological:  "     General: No focal deficit present.      Mental Status: He is alert and oriented to person, place, and time. Mental status is at baseline.      Cranial Nerves: No cranial nerve deficit.      Motor: No weakness.   Psychiatric:         Mood and Affect: Mood normal.         Behavior: Behavior normal.         Thought Content: Thought content normal.         Judgment: Judgment normal.            Significant Labs:  CBC:  Recent Labs   Lab 05/23/25  1402   WBC 5.00   RBC 3.47*   HGB 8.5*   HCT 28.9*      MCV 83   MCH 24.5*   MCHC 29.4*     BNP:  No results for input(s): "BNP" in the last 168 hours.    Invalid input(s): "BNPTRIAGELBLO"  CMP:  Recent Labs   Lab 05/23/25  1402   *   CALCIUM 8.0*   ALBUMIN 2.4*   PROT 7.1      K 3.4*   CO2 25      BUN 7*   CREATININE 3.4*   ALKPHOS 94   ALT 32   AST 31   BILITOT 0.5      Coagulation:   Recent Labs   Lab 05/20/25  1003 05/22/25  1104 05/23/25  1519   INR 1.6* 2.0* 2.5*   APTT  --   --  37.5*     LDH:  No results for input(s): "LDH" in the last 72 hours.  Microbiology:  Microbiology Results (last 7 days)       ** No results found for the last 168 hours. **            I have reviewed all pertinent labs within the past 24 hours.    Diagnostic Results:  I have reviewed all pertinent imaging results/findings within the past 24 hours.    Assessment/Plan:     Diarrhea of presumed infectious origin  Patient c.difficile tox and ag studies are negative. He was treated on oral vancomycin taper (125mg q6h for 9d, q12h for 7d, qdaily for 7d, qEveryOtherDay for 14d) after his 5/9-11 admission and is most recently on the q12h dosing which he has been compliant with. 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  -Stool studies: culture, Ova & Parasites, Giardia/crypto, Rotavirus, Calprotectin, Fecal Fat, Pancreatic Elastase   -Metamucil BID   -ID consult in AM for assessment of diarrhea and antibiotic regimen. Received oral vanc 500mg and IV metro 500mg on " 5/23/25 evening prior to transfer.     LVAD (left ventricular assist device) present  Stage D HFrEF 2/2 ICM s/p HM3 as DT (12/1/2023). Not decompensated.  Blood thinner: 1.25mg all other days. 2.5mg on Thursdays  Hydral 50mg q8h. Previously had low flow alarms with high maps and anemia    Procedure: Device Interrogation Including analysis of device parameters  Current Settings: Ventricular Assist Device  Review of device function is stable/unstable stable      5/23/2025    11:03 PM 5/11/2025     7:43 AM 5/11/2025     4:49 AM 5/10/2025    11:32 PM 5/10/2025     8:46 PM 5/10/2025     3:42 PM 5/10/2025    12:18 PM   TXP LVAD INTERROGATIONS   Type HeartMate3 HeartMate3 HeartMate3 HeartMate3 HeartMate3 HeartMate3 HeartMate3   Flow 3.3 3.8 3.6 4.1 3.4 3 3.2   Speed 5000 5000 5000 5000 5000 5000 5000   PI 6.6 4.9 5.6 4.4 7 5.3 7.7   Power ([place]) 3.3 3.4 3.4 3.4 3.4 3.4 3.4   LSL 4600 4600 4600 4600 4600 4600 4600   Pulsatility Pulse No Pulse No Pulse No Pulse No Pulse  No Pulse         ESRD (end stage renal disease)  Nephrology consult for MWF Dialysis    Anemia  Received 1 unit pRBC in early May for hgb to 6.8. Hgb is now 8.5 and stable.   His INR is 2 and no signs of acute bleeding  CTM        Javed Vivas MD  Heart Transplant  [facility] - Cardiac Intensive Care

## 2025-05-24 NOTE — ASSESSMENT & PLAN NOTE
Patient c.difficile tox and ag studies are negative. He was treated on oral vancomycin taper (125mg q6h for 9d, q12h for 7d, qdaily for 7d, qEveryOtherDay for 14d) after his 5/9-11 admission and is most recently on the q12h dosing which he has been compliant with. 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  -Stool studies: culture, Ova & Parasites, Giardia/crypto, Rotavirus, Calprotectin, Fecal Fat, Pancreatic Elastase   -Metamucil BID   -ID consult in AM for assessment of diarrhea and antibiotic regimen. Received oral vanc 500mg and IV metro 500mg on 5/23/25 evening prior to transfer.

## 2025-05-24 NOTE — ASSESSMENT & PLAN NOTE
ESRD on iHD MWF  [facility] ([facility])  3 hours  EDW:  82.1 kg  [facility] TDC  Last HD prior to presentation 5/23/25.      Plan/Recommendations:  -No emergent need for RRT at this time  -plan to continue 3x week dialysis while IP, next HD on Monday.  -K and phos low this morning, rather than replacing would liberalize his diet.  No need for renal diet unless hyperphosphatemia/hyperkalemia becomes an issue.  -1.5L fluid restrictions  -continue strict I/O's  -hold phos binder until K > 5     Anemia of ESRD  -On Micera as OP q 2 weeks;  defer VALENTIN to his OP dialysis unit  -PRN transfusion per primary team

## 2025-05-24 NOTE — HPI
66 yo male with VAD 12/2023 as DT, AICD, ESRD, prior Efaec bacteremia, with recent admission for CDAD on prolonged vanco taper here for diarrhea. Repeat Cdiff test on 5/23 negative. He is currently on PO vanc taper. Per chart review, pt was seen in ED on 5/12, given an empiric dose of fosfomycin. Ucx grew PSA, however, pt was asymptomatic. Pt reported his diarrhea did improve with the taper, however, once he received the fosfomycin, he had worsening diarrhea. Denied issues with LVAD/DL site. No fevers or chills, dysuria, or abdominal pain. Reports eating well cooked home made food, denies eating anything undercooked or raw.

## 2025-05-24 NOTE — SUBJECTIVE & OBJECTIVE
Past Medical History:   Diagnosis Date    Aspiration pneumonia of both lungs 06/17/2024    CAD (coronary artery disease)     CHF (congestive heart failure)     Delirium 06/16/2024    Diabetes mellitus     HFrEF (heart failure with reduced ejection fraction)     Hypoglycemia 06/12/2024    ICD (implantable cardioverter-defibrillator) in place     LVAD (left ventricular assist device) present 12/01/2023    MI, old     PAF (paroxysmal atrial fibrillation) 10/11/2023    Stage 4 chronic kidney disease 02/15/2024    Type 2 diabetes mellitus without complication, without long-term current use of insulin 10/07/2023       Past Surgical History:   Procedure Laterality Date    ANGIOPLASTY-VENOUS ARTERY Right 12/1/2023    Procedure: ANGIOPLASTY-VENOUS ARTERY, RIGHT FEMORAL;  Surgeon: Yuri Washington MD;  [facility];  Service: Cardiovascular;  Laterality: Right;    AORTIC VALVULOPLASTY N/A 12/1/2023    Procedure: REPAIR, AORTIC VALVE;  Surgeon: Yuri Washington MD;  [facility];  Service: Cardiovascular;  Laterality: N/A;    CARDIAC SURGERY      CLOSURE N/A 12/1/2023    Procedure: CLOSURE, TEMPORARY;  Surgeon: Yuri Washington MD;  Location: [facility] OR [facility] FLR;  Service: Cardiovascular;  Laterality: N/A;    DRAINAGE OF PLEURAL EFFUSION  12/4/2023    Procedure: DRAINAGE, PLEURAL EFFUSION;  Surgeon: Yuri Washington MD;  Location: [facility] OR [facility] FLR;  Service: Cardiovascular;;    INSERTION OF GRAFT TO PERICARDIUM  12/4/2023    Procedure: INSERTION, GRAFT, PERICARDIUM;  Surgeon: Yuri Washington MD;  [facility];  Service: Cardiovascular;;    INSERTION OF INTRA-AORTIC BALLOON ASSIST DEVICE Right 11/21/2023    Procedure: INSERTION, INTRA-AORTIC BALLOON PUMP;  Surgeon: Finn Cohn MD;  Location: [facility] CATH LAB;  Service: Cardiology;  Laterality: Right;    LEFT VENTRICULAR ASSIST DEVICE Left 12/1/2023    Procedure: INSERTION-LEFT VENTRICULAR ASSIST DEVICE;  Surgeon: Yuri Washington MD;   Location: [address] FLR;  Service: Cardiovascular;  Laterality: Left;  REDO STERNOTOMY - REDO SAW NEEDED FOR CASE    LYSIS OF ADHESIONS  12/1/2023    Procedure: LYSIS, ADHESIONS;  Surgeon: Yuri Washington MD;  [address]  Service: Cardiovascular;;    PLACEMENT OF SWAN ROLANDO CATHETER WITH IMAGING GUIDANCE  11/20/2023    Procedure: INSERTION, CATHETER, SWAN[place], WITH IMAGING GUIDANCE;  Surgeon: Sajan Hurley MD;  Location: [facility] CATH LAB;  Service: Cardiology;;    REMOVAL OF TUNNELED CENTRAL VENOUS CATHETER (CVC) N/A 3/1/2024    Procedure: REMOVAL, CATHETER, CENTRAL VENOUS, TUNNELED;  Surgeon: Seble Aguilar MD;  Location: [facility] CATH LAB;  Service: Interventional Nephrology;  Laterality: N/A;    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 12/1/2023    Procedure: REPAIR, ANEURYSM, ARTERY, FEMORAL;  Surgeon: Yuri Washington MD;  [address]  Service: Cardiovascular;  Laterality: Right;  Right Femoral Artery Repair    RIGHT HEART CATHETERIZATION Right 10/10/2023    Procedure: INSERTION, CATHETER, RIGHT HEART;  Surgeon: Bin Gandhi MD;  Location: [facility] CATH LAB;  Service: Cardiology;  Laterality: Right;    RIGHT HEART CATHETERIZATION Right 10/13/2023    Procedure: INSERTION, CATHETER, RIGHT HEART;  Surgeon: Walter Mcintyre MD;  Location: [facility] CATH LAB;  Service: Cardiology;  Laterality: Right;    RIGHT HEART CATHETERIZATION  11/13/2023    RIGHT HEART CATHETERIZATION Right 11/13/2023    Procedure: INSERTION, CATHETER, RIGHT HEART;  Surgeon: Juventino Bermudez Jr., MD;  Location: [facility] CATH LAB;  Service: Cardiology;  Laterality: Right;    RIGHT HEART CATHETERIZATION Right 11/20/2023    Procedure: INSERTION, CATHETER, RIGHT HEART;  Surgeon: Sajan Hurley MD;  Location: [facility] CATH LAB;  Service: Cardiology;  Laterality: Right;    RIGHT HEART CATHETERIZATION Right 1/22/2024    Procedure: INSERTION, CATHETER, RIGHT HEART;  Surgeon: Brayan Ocampo MD;  Location[facility]  LAB;  Service: Cardiology;  Laterality: Right;    STERNAL WOUND CLOSURE N/A 12/4/2023    Procedure: CLOSURE, WOUND, STERNUM;  Surgeon: Yuri Washington MD;  [facility]  Service: Cardiovascular;  Laterality: N/A;    STERNOTOMY N/A 12/1/2023    Procedure: STERNOTOMY, REDO;  Surgeon: Yuri Washington MD;  [facility];  Service: Cardiovascular;  Laterality: N/A;    VALVULOPLASTY, MITRAL VALVE N/A 12/1/2023    Procedure: VALVULOPLASTY, MITRAL VALVE;  Surgeon: Yuri Washington MD;  [facility];  Service: Cardiovascular;  Laterality: N/A;       Review of patient's allergies indicates:  No Known Allergies  Current Facility-Administered Medications   Medication Frequency    amiodarone tablet 400 mg Daily    amitriptyline tablet 25 mg Once per day on Monday Wednesday Friday    atorvastatin tablet 40 mg QHS    hydrALAZINE tablet 50 mg Q8H    omega 3-dha-epa-fish oil capsule 1 capsule Daily    oxyCODONE immediate release tablet 5 mg BID PRN    pregabalin capsule 100 mg Every other day    psyllium husk packet 2 packet BID    sevelamer carbonate tablet 800 mg TID WM    trazodone split tablet 25 mg QHS    vancomycin 125 mg/5 mL oral solution 125 mg Q6H    venlafaxine tablet 37.5 mg Daily    vitamin D 1000 units tablet 1,000 Units Daily    warfarin (COUMADIN) split tablet 1.25 mg Once per day on Sunday Monday Tuesday Wednesday Friday Saturday    [START ON 5/29/2025] warfarin (COUMADIN) tablet 2.5 mg Every Thursday     Family History    None       Tobacco Use    Smoking status: Former     Current packs/day: 0.50     Types: Cigarettes    Smokeless tobacco: Not on file   Substance and Sexual Activity    Alcohol use: Yes     Comment: rarely    Drug use: No    Sexual activity: Not Currently     Partners: Female     Review of Systems   Constitutional:  Negative for fatigue and fever.   Respiratory:  Negative for shortness of breath.    Cardiovascular:  Negative for chest pain, palpitations and leg  swelling.   Gastrointestinal:  Positive for diarrhea. Negative for blood in stool.   Skin: Negative.    Neurological:  Positive for weakness.     Objective:     Vital Signs (Most Recent):  Temp: 98.5 °F (36.9 °C) (05/24/25 1125)  Pulse: 69 (05/24/25 1125)  Resp: 18 (05/24/25 1234)  BP: (!) 82/0 (05/24/25 1200)  SpO2: 100 % (05/24/25 1125) Vital Signs (24h Range):  Temp:  [98 °F (36.7 °C)-99 °F (37.2 °C)] 98.5 °F (36.9 °C)  Pulse:  [69-84] 69  Resp:  [10-19] 18  SpO2:  [97 %-100 %] 100 %  BP: ()/(0-83) 82/0     Weight: 86.5 kg (190 lb 11.2 oz) (05/23/25 2330)  Body mass index is 25.16 kg/m².  Body surface area is 2.11 meters squared.    No intake/output data recorded.     Physical Exam  Vitals and nursing note reviewed.   HENT:      Mouth/Throat:      Pharynx: Oropharynx is clear.   Cardiovascular:      Rate and Rhythm: Normal rate.      Comments: LVAD  Pulmonary:      Effort: Pulmonary effort is normal.   Abdominal:      Palpations: Abdomen is soft.   Musculoskeletal:      Right lower leg: No edema.      Left lower leg: No edema.   Neurological:      Mental Status: He is alert and oriented to person, place, and time.   Psychiatric:         Mood and Affect: Mood normal.         Behavior: Behavior normal.          Significant Labs:  CBC:   Recent Labs   Lab 05/24/25  0552   WBC 4.31   RBC 3.11*   HGB 7.5*   HCT 25.6*      MCV 82   MCH 24.1*   MCHC 29.3*     CMP:   Recent Labs   Lab 05/23/25  1402 05/24/25  0552   * 91   CALCIUM 8.0* 7.5*   ALBUMIN 2.4*  --    PROT 7.1  --     138   K 3.4* 3.2*   CO2 25 25    104   BUN 7* 9   CREATININE 3.4* 4.0*   ALKPHOS 94  --    ALT 32  --    AST 31  --    BILITOT 0.5  --      All labs within the past 24 hours have been reviewed.

## 2025-05-24 NOTE — ASSESSMENT & PLAN NOTE
Patient c.difficile tox and ag studies are negative. He was treated on oral vancomycin taper (125mg q6h for 9d, q12h for 7d, qdaily for 7d, qEveryOtherDay for 14d) after his 5/9-11 admission and is most recently on the q12h dosing which he has been compliant with. 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  -Stool studies: culture, Ova & Parasites, Giardia/crypto, Rotavirus, Calprotectin, Fecal Fat, Pancreatic Elastase   -Metamucil BID   -ID consult, appreciate recs. Received oral vanc 500mg and IV metro 500mg on 5/23/25 evening prior to transfer.   -per ID, cont vancomycin taper. Monitor stool frequency. Can transition to fidoxymycin if no improvement in stools.

## 2025-05-24 NOTE — ASSESSMENT & PLAN NOTE
Received 1 unit pRBC in early May for hgb to 6.8. Hgb is 8.5 on admit, dec to 7.5 next day. BP stable. N o visible blood in stool.  His INR is 2 and no signs of acute bleeding  CTM  -iron studies pending

## 2025-05-24 NOTE — PROGRESS NOTES
05/24/2025  Erasmo Hooper    Current provider:  Sajan Hurley, *    Device interrogation:      5/24/2025     5:13 AM 5/23/2025    11:03 PM 5/11/2025     7:43 AM 5/11/2025     4:49 AM 5/10/2025    11:32 PM 5/10/2025     8:46 PM 5/10/2025     3:42 PM   TXP LVAD INTERROGATIONS   Type HeartMate3 HeartMate3 HeartMate3 HeartMate3 HeartMate3 HeartMate3 HeartMate3   Flow 2.9 3.3 3.8 3.6 4.1 3.4 3   Speed 5000 5000 5000 5000 5000 5000 5000   PI 9.2 6.6 4.9 5.6 4.4 7 5.3   Power ([place]) 3.4 3.3 3.4 3.4 3.4 3.4 3.4   LSL  4600 4600 4600 4600 4600 4600   Pulsatility Pulse Pulse No Pulse No Pulse No Pulse No Pulse           Rounded on [facility] to ensure all mechanical assist device settings (IABP or VAD) were appropriate and all parameters were within limits.  I was able to ensure all back up equipment was present, the staff had no issues, and the Perfusion Department daily rounding was complete.      For implantable VADs: Interrogation of Ventricular assist device was performed with analysis of device parameters and review of device function. I have personally reviewed the interrogation findings and agree with findings as stated.     In emergency, the nursing units have been notified to contact the perfusion department either by:  Calling [id number] from 630am to 4pm Mon thru Fri, utilizing the On-Call Finder functionality of Epic and searching for Perfusion, or by contacting the hospital  from 4pm to 630am and on weekends and asking to speak with the perfusionist on call.    8:45 AM

## 2025-05-25 LAB
ABSOLUTE EOSINOPHIL (OHS): 0.15 K/UL
ABSOLUTE MONOCYTE (OHS): 0.41 K/UL (ref 0.3–1)
ABSOLUTE NEUTROPHIL COUNT (OHS): 3.4 K/UL (ref 1.8–7.7)
ANION GAP (OHS): 10 MMOL/L (ref 8–16)
ANION GAP (OHS): 9 MMOL/L (ref 8–16)
BASOPHILS # BLD AUTO: 0.02 K/UL
BASOPHILS NFR BLD AUTO: 0.4 %
BUN SERPL-MCNC: 15 MG/DL (ref 8–23)
BUN SERPL-MCNC: 19 MG/DL (ref 8–23)
CALCIUM SERPL-MCNC: 7.6 MG/DL (ref 8.7–10.5)
CALCIUM SERPL-MCNC: 7.7 MG/DL (ref 8.7–10.5)
CHLORIDE SERPL-SCNC: 105 MMOL/L (ref 95–110)
CHLORIDE SERPL-SCNC: 106 MMOL/L (ref 95–110)
CO2 SERPL-SCNC: 23 MMOL/L (ref 23–29)
CO2 SERPL-SCNC: 24 MMOL/L (ref 23–29)
CREAT SERPL-MCNC: 5.5 MG/DL (ref 0.5–1.4)
CREAT SERPL-MCNC: 6.1 MG/DL (ref 0.5–1.4)
ERYTHROCYTE [DISTWIDTH] IN BLOOD BY AUTOMATED COUNT: 20.1 % (ref 11.5–14.5)
GFR SERPLBLD CREATININE-BSD FMLA CKD-EPI: 10 ML/MIN/1.73/M2
GFR SERPLBLD CREATININE-BSD FMLA CKD-EPI: 11 ML/MIN/1.73/M2
GLUCOSE SERPL-MCNC: 109 MG/DL (ref 70–110)
GLUCOSE SERPL-MCNC: 162 MG/DL (ref 70–110)
HCT VFR BLD AUTO: 28.1 % (ref 40–54)
HGB BLD-MCNC: 8.1 GM/DL (ref 14–18)
IMM GRANULOCYTES # BLD AUTO: 0.04 K/UL (ref 0–0.04)
IMM GRANULOCYTES NFR BLD AUTO: 0.8 % (ref 0–0.5)
INR PPP: 2.9 (ref 0.8–1.2)
LYMPHOCYTES # BLD AUTO: 0.74 K/UL (ref 1–4.8)
MAGNESIUM SERPL-MCNC: 1.8 MG/DL (ref 1.6–2.6)
MCH RBC QN AUTO: 23.9 PG (ref 27–31)
MCHC RBC AUTO-ENTMCNC: 28.8 G/DL (ref 32–36)
MCV RBC AUTO: 83 FL (ref 82–98)
NUCLEATED RBC (/100WBC) (OHS): 0 /100 WBC
PHOSPHATE SERPL-MCNC: 2 MG/DL (ref 2.7–4.5)
PLATELET # BLD AUTO: 225 K/UL (ref 150–450)
PMV BLD AUTO: 9.7 FL (ref 9.2–12.9)
POTASSIUM SERPL-SCNC: 3.8 MMOL/L (ref 3.5–5.1)
POTASSIUM SERPL-SCNC: 4 MMOL/L (ref 3.5–5.1)
PROTHROMBIN TIME: 29.3 SECONDS (ref 9–12.5)
RBC # BLD AUTO: 3.39 M/UL (ref 4.6–6.2)
RELATIVE EOSINOPHIL (OHS): 3.2 %
RELATIVE LYMPHOCYTE (OHS): 15.5 % (ref 18–48)
RELATIVE MONOCYTE (OHS): 8.6 % (ref 4–15)
RELATIVE NEUTROPHIL (OHS): 71.5 % (ref 38–73)
SODIUM SERPL-SCNC: 138 MMOL/L (ref 136–145)
SODIUM SERPL-SCNC: 139 MMOL/L (ref 136–145)
WBC # BLD AUTO: 4.76 K/UL (ref 3.9–12.7)

## 2025-05-25 PROCEDURE — 36415 COLL VENOUS BLD VENIPUNCTURE: CPT | Performed by: STUDENT IN AN ORGANIZED HEALTH CARE EDUCATION/TRAINING PROGRAM

## 2025-05-25 PROCEDURE — 25000242 PHARM REV CODE 250 ALT 637 W/ HCPCS: Performed by: STUDENT IN AN ORGANIZED HEALTH CARE EDUCATION/TRAINING PROGRAM

## 2025-05-25 PROCEDURE — 99233 SBSQ HOSP IP/OBS HIGH 50: CPT | Mod: ,,, | Performed by: INTERNAL MEDICINE

## 2025-05-25 PROCEDURE — 36415 COLL VENOUS BLD VENIPUNCTURE: CPT

## 2025-05-25 PROCEDURE — 93750 INTERROGATION VAD IN PERSON: CPT | Mod: ,,, | Performed by: INTERNAL MEDICINE

## 2025-05-25 PROCEDURE — 20600001 HC STEP DOWN PRIVATE ROOM

## 2025-05-25 PROCEDURE — 27000248 HC VAD-ADDITIONAL DAY

## 2025-05-25 PROCEDURE — 25000003 PHARM REV CODE 250: Performed by: STUDENT IN AN ORGANIZED HEALTH CARE EDUCATION/TRAINING PROGRAM

## 2025-05-25 PROCEDURE — 80048 BASIC METABOLIC PNL TOTAL CA: CPT

## 2025-05-25 RX ADMIN — AMIODARONE HYDROCHLORIDE 400 MG: 200 TABLET ORAL at 08:05

## 2025-05-25 RX ADMIN — TRAZODONE HYDROCHLORIDE 25 MG: 50 TABLET ORAL at 08:05

## 2025-05-25 RX ADMIN — ATORVASTATIN CALCIUM 40 MG: 40 TABLET, FILM COATED ORAL at 08:05

## 2025-05-25 RX ADMIN — VANCOMYCIN HYDROCHLORIDE 125 MG: KIT at 11:05

## 2025-05-25 RX ADMIN — HYDRALAZINE HYDROCHLORIDE 50 MG: 50 TABLET ORAL at 07:05

## 2025-05-25 RX ADMIN — VANCOMYCIN HYDROCHLORIDE 125 MG: KIT at 07:05

## 2025-05-25 RX ADMIN — PSYLLIUM HUSK 2 PACKET: 3.4 POWDER ORAL at 08:05

## 2025-05-25 RX ADMIN — Medication 1000 UNITS: at 08:05

## 2025-05-25 RX ADMIN — Medication 1 CAPSULE: at 08:05

## 2025-05-25 RX ADMIN — VANCOMYCIN HYDROCHLORIDE 125 MG: KIT at 06:05

## 2025-05-25 RX ADMIN — VENLAFAXINE 37.5 MG: 37.5 TABLET ORAL at 08:05

## 2025-05-25 RX ADMIN — VANCOMYCIN HYDROCHLORIDE 125 MG: KIT at 01:05

## 2025-05-25 RX ADMIN — VANCOMYCIN HYDROCHLORIDE 125 MG: KIT at 12:05

## 2025-05-25 RX ADMIN — HYDRALAZINE HYDROCHLORIDE 50 MG: 50 TABLET ORAL at 09:05

## 2025-05-25 NOTE — ASSESSMENT & PLAN NOTE
Stage D HFrEF 2/2 ICM s/p HM3 as DT (12/1/2023). Not decompensated.  Blood thinner: 1.25mg all other days. 2.5mg on Thursdays  Hydral 50mg q8h. Previously had low flow alarms with high maps and anemia    Procedure: Device Interrogation Including analysis of device parameters  Current Settings: Ventricular Assist Device  Review of device function is stable/unstable stable      5/25/2025    12:00 PM 5/25/2025     7:31 AM 5/25/2025     4:58 AM 5/25/2025     1:04 AM 5/24/2025     4:07 PM 5/24/2025    12:00 PM 5/24/2025     8:00 AM   TXP LVAD INTERROGATIONS   Type HeartMate3 HeartMate3 HeartMate3 HeartMate3 HeartMate3 HeartMate3 HeartMate3   Flow 4.2 3.2 3.7 3.6 3.8 3.5 4.1   Speed 5000 5000 5000 5000 5050 5050 5000   PI 3.9 7.4 5.3 5.6 4.8 6 4.4   Power ([place]) 3.4 3.3 3.3 3.4 3.4 3.3 3.4   Pulsatility Pulse Pulse  Pulse Pulse Pulse Pulse

## 2025-05-25 NOTE — SUBJECTIVE & OBJECTIVE
Interval History: pt without BM ovn. Last BM was yesterday, starting to become more solid. Cont po vancomycin with taper. Currently on q6hr dosing.     4 second LFA yesterday. Pt did not notify bedside nurse/was asymptomatic. Unknown BP as nursing/provider not notified during alarm. Likely in setting of diarrhea. Will cont to monitor for additional LFA.     If no further lfa and diarrhea cont to resolve, pt can likely d/c with continued po vancomycin dosing/prolonged taper.     Continuous Infusions:  Scheduled Meds:   amiodarone  400 mg Oral Daily    amitriptyline  25 mg Oral Once per day on Monday Wednesday Friday    atorvastatin  40 mg Oral QHS    hydrALAZINE  50 mg Oral Q8H    omega 3-dha-epa-fish oil  1 capsule Oral Daily    pregabalin  100 mg Oral Every other day    psyllium husk  2 packet Oral BID    traZODone  25 mg Oral QHS    vancomycin  125 mg Oral Q6H    venlafaxine  37.5 mg Oral Daily    vitamin D  1,000 Units Oral Daily    [START ON 5/26/2025] warfarin  1.25 mg Oral Once per day on Sunday Monday Tuesday Wednesday Friday Saturday    [START ON 5/29/2025] warfarin  2.5 mg Oral Every Thursday     PRN Meds:  Current Facility-Administered Medications:     oxyCODONE, 5 mg, Oral, BID PRN    Review of patient's allergies indicates:  No Known Allergies  Objective:     Vital Signs (Most Recent):  Temp: 98.9 °F (37.2 °C) (05/25/25 1200)  Pulse: 86 (05/25/25 1200)  Resp: 19 (05/25/25 1200)  BP: (!) 54/0 (05/25/25 1200)  SpO2: 98 % (05/25/25 1200) Vital Signs (24h Range):  Temp:  [97.4 °F (36.3 °C)-98.9 °F (37.2 °C)] 98.9 °F (37.2 °C)  Pulse:  [69-88] 86  Resp:  [18-19] 19  SpO2:  [95 %-100 %] 98 %  BP: ()/(0-78) 54/0     Patient Vitals for the past 72 hrs (Last 3 readings):   Weight   05/23/25 2330 86.5 kg (190 lb 11.2 oz)     Body mass index is 25.16 kg/m².      Intake/Output Summary (Last 24 hours) at 5/25/2025 1343  Last data filed at 5/25/2025 1258  Gross per 24 hour   Intake --   Output 1 ml   Net -1 ml  "      Hemodynamic Parameters:       Telemetry: reviewed       Physical Exam  Vitals and nursing note reviewed.   Constitutional:       Appearance: He is normal weight.   HENT:      Head: Normocephalic and atraumatic.      Right Ear: External ear normal.      Left Ear: External ear normal.      Nose: Nose normal.      Mouth/Throat:      Mouth: Mucous membranes are moist.      Pharynx: Oropharynx is clear.   Eyes:      Conjunctiva/sclera: Conjunctivae normal.   Cardiovascular:      Pulses: Normal pulses.      Heart sounds: Normal heart sounds.      Comments: Nl vad hum  Pulmonary:      Effort: Pulmonary effort is normal.      Breath sounds: Normal breath sounds.   Abdominal:      General: Abdomen is flat. Bowel sounds are normal.      Palpations: Abdomen is soft.   Musculoskeletal:      Cervical back: Normal range of motion.      Right lower leg: No edema.      Left lower leg: No edema.   Neurological:      Mental Status: He is alert.            Significant Labs:  CBC:  Recent Labs   Lab 05/23/25  1402 05/24/25 0552 05/25/25  0349   WBC 5.00 4.31 4.76   RBC 3.47* 3.11* 3.39*   HGB 8.5* 7.5* 8.1*   HCT 28.9* 25.6* 28.1*    198 225   MCV 83 82 83   MCH 24.5* 24.1* 23.9*   MCHC 29.4* 29.3* 28.8*     BNP:  No results for input(s): "BNP" in the last 168 hours.    Invalid input(s): "BNPTRIAGELBLO"  CMP:  Recent Labs   Lab 05/23/25  1402 05/24/25  0552 05/24/25 2022 05/25/25  0349   * 91 180* 109   CALCIUM 8.0* 7.5* 7.5* 7.7*   ALBUMIN 2.4*  --   --   --    PROT 7.1  --   --   --     138 137 139   K 3.4* 3.2* 4.1 3.8   CO2 25 25 22* 24    104 104 105   BUN 7* 9 13 15   CREATININE 3.4* 4.0* 4.9* 5.5*   ALKPHOS 94  --   --   --    ALT 32  --   --   --    AST 31  --   --   --    BILITOT 0.5  --   --   --       Coagulation:   Recent Labs   Lab 05/23/25  1519 05/24/25  0552 05/25/25  0349   INR 2.5* 3.0* 2.9*   APTT 37.5*  --   --      LDH:  No results for input(s): "LDH" in the last 72 " hours.  Microbiology:  Microbiology Results (last 7 days)       Procedure Component Value Units Date/Time    Stool culture [[id number]]     Order Status: Sent Specimen: Stool             I have reviewed all pertinent labs within the past 24 hours.    Estimated Creatinine Clearance: 15.5 mL/min (A) (based on SCr of 5.5 mg/dL (H)).    Diagnostic Results:  I have reviewed and interpreted all pertinent imaging results/findings within the past 24 hours.

## 2025-05-25 NOTE — PROGRESS NOTES
[facility] - Cardiac Intensive Care  Heart Transplant  Progress Note    Patient Name: Radha Abbott  MRN: [id number]  Admission Date: 5/23/2025  Hospital Length of Stay: 2 days  Attending Physician: Sajan Hurley, *  Primary Care Provider: Vasu Kong MD  Principal Problem:<principal problem not specified>    Subjective:   Interval History: pt without BM ovn. Last BM was yesterday, starting to become more solid. Cont po vancomycin with taper. Currently on q6hr dosing.     4 second LFA yesterday. Pt did not notify bedside nurse/was asymptomatic. Unknown BP as nursing/provider not notified during alarm. Likely in setting of diarrhea. Will cont to monitor for additional LFA.     If no further lfa and diarrhea cont to resolve, pt can likely d/c with continued po vancomycin dosing/prolonged taper.     Continuous Infusions:  Scheduled Meds:   amiodarone  400 mg Oral Daily    amitriptyline  25 mg Oral Once per day on Monday Wednesday Friday    atorvastatin  40 mg Oral QHS    hydrALAZINE  50 mg Oral Q8H    omega 3-dha-epa-fish oil  1 capsule Oral Daily    pregabalin  100 mg Oral Every other day    psyllium husk  2 packet Oral BID    traZODone  25 mg Oral QHS    vancomycin  125 mg Oral Q6H    venlafaxine  37.5 mg Oral Daily    vitamin D  1,000 Units Oral Daily    [START ON 5/26/2025] warfarin  1.25 mg Oral Once per day on Sunday Monday Tuesday Wednesday Friday Saturday    [START ON 5/29/2025] warfarin  2.5 mg Oral Every Thursday     PRN Meds:  Current Facility-Administered Medications:     oxyCODONE, 5 mg, Oral, BID PRN    Review of patient's allergies indicates:  No Known Allergies  Objective:     Vital Signs (Most Recent):  Temp: 98.9 °F (37.2 °C) (05/25/25 1200)  Pulse: 86 (05/25/25 1200)  Resp: 19 (05/25/25 1200)  BP: (!) 54/0 (05/25/25 1200)  SpO2: 98 % (05/25/25 1200) Vital Signs (24h Range):  Temp:  [97.4 °F (36.3 °C)-98.9 °F (37.2 °C)] 98.9 °F (37.2 °C)  Pulse:  [69-88] 86  Resp:  [18-19] 19  SpO2:  [95  "%-100 %] 98 %  BP: ()/(0-78) 54/0     Patient Vitals for the past 72 hrs (Last 3 readings):   Weight   05/23/25 2330 86.5 kg (190 lb 11.2 oz)     Body mass index is 25.16 kg/m².      Intake/Output Summary (Last 24 hours) at 5/25/2025 1343  Last data filed at 5/25/2025 1258  Gross per 24 hour   Intake --   Output 1 ml   Net -1 ml       Hemodynamic Parameters:       Telemetry: reviewed       Physical Exam  Vitals and nursing note reviewed.   Constitutional:       Appearance: He is normal weight.   HENT:      Head: Normocephalic and atraumatic.      Right Ear: External ear normal.      Left Ear: External ear normal.      Nose: Nose normal.      Mouth/Throat:      Mouth: Mucous membranes are moist.      Pharynx: Oropharynx is clear.   Eyes:      Conjunctiva/sclera: Conjunctivae normal.   Cardiovascular:      Pulses: Normal pulses.      Heart sounds: Normal heart sounds.      Comments: Nl vad hum  Pulmonary:      Effort: Pulmonary effort is normal.      Breath sounds: Normal breath sounds.   Abdominal:      General: Abdomen is flat. Bowel sounds are normal.      Palpations: Abdomen is soft.   Musculoskeletal:      Cervical back: Normal range of motion.      Right lower leg: No edema.      Left lower leg: No edema.   Neurological:      Mental Status: He is alert.            Significant Labs:  CBC:  Recent Labs   Lab 05/23/25  1402 05/24/25  0552 05/25/25  0349   WBC 5.00 4.31 4.76   RBC 3.47* 3.11* 3.39*   HGB 8.5* 7.5* 8.1*   HCT 28.9* 25.6* 28.1*    198 225   MCV 83 82 83   MCH 24.5* 24.1* 23.9*   MCHC 29.4* 29.3* 28.8*     BNP:  No results for input(s): "BNP" in the last 168 hours.    Invalid input(s): "BNPTRIAGELBLO"  CMP:  Recent Labs   Lab 05/23/25  1402 05/24/25  0552 05/24/25 2022 05/25/25  0349   * 91 180* 109   CALCIUM 8.0* 7.5* 7.5* 7.7*   ALBUMIN 2.4*  --   --   --    PROT 7.1  --   --   --     138 137 139   K 3.4* 3.2* 4.1 3.8   CO2 25 25 22* 24    104 104 105   BUN 7* 9 13 15 " "  CREATININE 3.4* 4.0* 4.9* 5.5*   ALKPHOS 94  --   --   --    ALT 32  --   --   --    AST 31  --   --   --    BILITOT 0.5  --   --   --       Coagulation:   Recent Labs   Lab 05/23/25  1519 05/24/25  0552 05/25/25  0349   INR 2.5* 3.0* 2.9*   APTT 37.5*  --   --      LDH:  No results for input(s): "LDH" in the last 72 hours.  Microbiology:  Microbiology Results (last 7 days)       Procedure Component Value Units Date/Time    Stool culture [[id number]]     Order Status: Sent Specimen: Stool             I have reviewed all pertinent labs within the past 24 hours.    Estimated Creatinine Clearance: 15.5 mL/min (A) (based on SCr of 5.5 mg/dL (H)).    Diagnostic Results:  I have reviewed and interpreted all pertinent imaging results/findings within the past 24 hours.  Assessment and Plan:     64M PMHx of stage D HFrEF 2/2 ICM s/p HM3 as DT (12/1/2023), ESRD on HD (MWF), and debility here for ongoing diarrhea after treatment of c. difficile infection. Pertinent history below:    5/9-11: Patient was recently hospitalized for anemia and C-difficile infection. At that time patient received a unit of blood, hydrated for low flow alarms, hydralazine given to target MAPs to 80s with Hydral 50mg TID, then put on extended vancomycin course with taper for discharge on 5/11  5 mLs (125 mg total) q6 hours for 9 days,   5 mLs (125 mg total) q12h day for 7 days,   5 mLs (125 mg total) qdaily for 7 days,   5 mLs (125 mg total) every other day for 14 days.    5/12: Then presented for UTI symptoms for which he took Fosfomycin. The Urine cultures grew pseudomonas aeruginosa which ID recommended to forego further treatment if no longer having UTI symptoms.    5/23/25: Patient reports today for ongoing weakness and diarrhea. States that he has been taking the vancomycin and has been on the 125mg q12h dosage. He had C. diff toxin and antigen collected which both resulted negative. Otherwise his labwork has been stable with wbc 5.0, hgb 8.5, " borderline K 3.4, INR 2.0. At the outside hospital he was given IV metronidazole 500mg and oral vancomycin 500mg once. He has been taking loperamide every 4 hours, and had one dose of metamucil.       Anemia  Received 1 unit pRBC in early May for hgb to 6.8. Hgb is 8.5 on admit, dec to 7.5 next day. BP stable. N o visible blood in stool.  His INR is 2 and no signs of acute bleeding  CTM  -iron studies pending   -hgb stable/improved to 8.1 5/25     LVAD (left ventricular assist device) present  Stage D HFrEF 2/2 ICM s/p HM3 as DT (12/1/2023). Not decompensated.  Blood thinner: 1.25mg all other days. 2.5mg on Thursdays  Hydral 50mg q8h. Previously had low flow alarms with high maps and anemia    Procedure: Device Interrogation Including analysis of device parameters  Current Settings: Ventricular Assist Device  Review of device function is stable/unstable stable      5/25/2025    12:00 PM 5/25/2025     7:31 AM 5/25/2025     4:58 AM 5/25/2025     1:04 AM 5/24/2025     4:07 PM 5/24/2025    12:00 PM 5/24/2025     8:00 AM   TXP LVAD INTERROGATIONS   Type HeartMate3 HeartMate3 HeartMate3 HeartMate3 HeartMate3 HeartMate3 HeartMate3   Flow 4.2 3.2 3.7 3.6 3.8 3.5 4.1   Speed 5000 5000 5000 5000 5050 5050 5000   PI 3.9 7.4 5.3 5.6 4.8 6 4.4   Power ([place]) 3.4 3.3 3.3 3.4 3.4 3.3 3.4   Pulsatility Pulse Pulse  Pulse Pulse Pulse Pulse         ESRD (end stage renal disease)  Nephrology consult for F Dialysis    Diarrhea of presumed infectious origin  Patient c.difficile tox and ag studies are negative. He was treated on oral vancomycin taper (125mg q6h for 9d, q12h for 7d, qdaily for 7d, qEveryOtherDay for 14d) after his 5/9-11 admission and is most recently on the q12h dosing which he has been compliant with. 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  -Stool studies: culture, Ova & Parasites, Giardia/crypto, Rotavirus, Calprotectin, Fecal Fat, Pancreatic Elastase   -Metamucil BID   -ID consult, appreciate recs.  Received oral vanc 500mg and IV metro 500mg on 5/23/25 evening prior to transfer.   -per ID, cont vancomycin taper. Monitor stool frequency. Can transition to fidoxymycin if no improvement in stools.         Lilli Mckenzie MD  Heart Transplant  [facility] - Cardiac Intensive Care

## 2025-05-25 NOTE — ASSESSMENT & PLAN NOTE
Received 1 unit pRBC in early May for hgb to 6.8. Hgb is 8.5 on admit, dec to 7.5 next day. BP stable. N o visible blood in stool.  His INR is 2 and no signs of acute bleeding  CTM  -iron studies pending   -hgb stable/improved to 8.1 5/25

## 2025-05-26 VITALS
SYSTOLIC BLOOD PRESSURE: 92 MMHG | TEMPERATURE: 98 F | HEIGHT: 73 IN | HEART RATE: 82 BPM | BODY MASS INDEX: 24.83 KG/M2 | WEIGHT: 187.38 LBS | OXYGEN SATURATION: 98 % | RESPIRATION RATE: 16 BRPM

## 2025-05-26 LAB
ABSOLUTE EOSINOPHIL (OHS): 0.17 K/UL
ABSOLUTE MONOCYTE (OHS): 0.34 K/UL (ref 0.3–1)
ABSOLUTE NEUTROPHIL COUNT (OHS): 4.69 K/UL (ref 1.8–7.7)
ANION GAP (OHS): 11 MMOL/L (ref 8–16)
BASOPHILS # BLD AUTO: 0.03 K/UL
BASOPHILS NFR BLD AUTO: 0.5 %
BUN SERPL-MCNC: 23 MG/DL (ref 8–23)
CALCIUM SERPL-MCNC: 7.7 MG/DL (ref 8.7–10.5)
CHLORIDE SERPL-SCNC: 107 MMOL/L (ref 95–110)
CO2 SERPL-SCNC: 21 MMOL/L (ref 23–29)
CREAT SERPL-MCNC: 6.5 MG/DL (ref 0.5–1.4)
ERYTHROCYTE [DISTWIDTH] IN BLOOD BY AUTOMATED COUNT: 20.5 % (ref 11.5–14.5)
GFR SERPLBLD CREATININE-BSD FMLA CKD-EPI: 9 ML/MIN/1.73/M2
GLUCOSE SERPL-MCNC: 106 MG/DL (ref 70–110)
HCT VFR BLD AUTO: 27.6 % (ref 40–54)
HGB BLD-MCNC: 8.2 GM/DL (ref 14–18)
IMM GRANULOCYTES # BLD AUTO: 0.04 K/UL (ref 0–0.04)
IMM GRANULOCYTES NFR BLD AUTO: 0.7 % (ref 0–0.5)
INR PPP: 2.5 (ref 0.8–1.2)
LYMPHOCYTES # BLD AUTO: 0.77 K/UL (ref 1–4.8)
MAGNESIUM SERPL-MCNC: 1.8 MG/DL (ref 1.6–2.6)
MCH RBC QN AUTO: 24.6 PG (ref 27–31)
MCHC RBC AUTO-ENTMCNC: 29.7 G/DL (ref 32–36)
MCV RBC AUTO: 83 FL (ref 82–98)
NUCLEATED RBC (/100WBC) (OHS): 0 /100 WBC
PHOSPHATE SERPL-MCNC: 1.6 MG/DL (ref 2.7–4.5)
PLATELET # BLD AUTO: 236 K/UL (ref 150–450)
PMV BLD AUTO: 9.6 FL (ref 9.2–12.9)
POTASSIUM SERPL-SCNC: 4.1 MMOL/L (ref 3.5–5.1)
PROTHROMBIN TIME: 26 SECONDS (ref 9–12.5)
RBC # BLD AUTO: 3.33 M/UL (ref 4.6–6.2)
RELATIVE EOSINOPHIL (OHS): 2.8 %
RELATIVE LYMPHOCYTE (OHS): 12.7 % (ref 18–48)
RELATIVE MONOCYTE (OHS): 5.6 % (ref 4–15)
RELATIVE NEUTROPHIL (OHS): 77.7 % (ref 38–73)
SODIUM SERPL-SCNC: 139 MMOL/L (ref 136–145)
WBC # BLD AUTO: 6.04 K/UL (ref 3.9–12.7)

## 2025-05-26 PROCEDURE — 80100014 HC HEMODIALYSIS 1:1

## 2025-05-26 PROCEDURE — 99233 SBSQ HOSP IP/OBS HIGH 50: CPT | Mod: ,,, | Performed by: INTERNAL MEDICINE

## 2025-05-26 PROCEDURE — 25000242 PHARM REV CODE 250 ALT 637 W/ HCPCS: Performed by: STUDENT IN AN ORGANIZED HEALTH CARE EDUCATION/TRAINING PROGRAM

## 2025-05-26 PROCEDURE — 93750 INTERROGATION VAD IN PERSON: CPT | Mod: ,,, | Performed by: INTERNAL MEDICINE

## 2025-05-26 PROCEDURE — 99900035 HC TECH TIME PER 15 MIN (STAT)

## 2025-05-26 PROCEDURE — 25000003 PHARM REV CODE 250: Performed by: STUDENT IN AN ORGANIZED HEALTH CARE EDUCATION/TRAINING PROGRAM

## 2025-05-26 PROCEDURE — 27000248 HC VAD-ADDITIONAL DAY

## 2025-05-26 PROCEDURE — 85025 COMPLETE CBC W/AUTO DIFF WBC: CPT | Performed by: STUDENT IN AN ORGANIZED HEALTH CARE EDUCATION/TRAINING PROGRAM

## 2025-05-26 PROCEDURE — 80048 BASIC METABOLIC PNL TOTAL CA: CPT | Performed by: STUDENT IN AN ORGANIZED HEALTH CARE EDUCATION/TRAINING PROGRAM

## 2025-05-26 PROCEDURE — 99232 SBSQ HOSP IP/OBS MODERATE 35: CPT | Mod: ,,,

## 2025-05-26 PROCEDURE — 25000003 PHARM REV CODE 250: Performed by: PHYSICIAN ASSISTANT

## 2025-05-26 PROCEDURE — 5A1D70Z PERFORMANCE OF URINARY FILTRATION, INTERMITTENT, LESS THAN 6 HOURS PER DAY: ICD-10-PCS | Performed by: INTERNAL MEDICINE

## 2025-05-26 PROCEDURE — 36415 COLL VENOUS BLD VENIPUNCTURE: CPT | Performed by: STUDENT IN AN ORGANIZED HEALTH CARE EDUCATION/TRAINING PROGRAM

## 2025-05-26 PROCEDURE — 25000003 PHARM REV CODE 250

## 2025-05-26 PROCEDURE — 85610 PROTHROMBIN TIME: CPT | Performed by: STUDENT IN AN ORGANIZED HEALTH CARE EDUCATION/TRAINING PROGRAM

## 2025-05-26 PROCEDURE — 83735 ASSAY OF MAGNESIUM: CPT | Performed by: STUDENT IN AN ORGANIZED HEALTH CARE EDUCATION/TRAINING PROGRAM

## 2025-05-26 PROCEDURE — 94761 N-INVAS EAR/PLS OXIMETRY MLT: CPT

## 2025-05-26 PROCEDURE — 84100 ASSAY OF PHOSPHORUS: CPT | Performed by: STUDENT IN AN ORGANIZED HEALTH CARE EDUCATION/TRAINING PROGRAM

## 2025-05-26 RX ORDER — HEPARIN SODIUM 1000 [USP'U]/ML
1000 INJECTION, SOLUTION INTRAVENOUS; SUBCUTANEOUS
OUTPATIENT
Start: 2025-05-26

## 2025-05-26 RX ORDER — SODIUM CHLORIDE 9 MG/ML
INJECTION, SOLUTION INTRAVENOUS ONCE
Status: CANCELLED | OUTPATIENT
Start: 2025-05-26 | End: 2025-05-26

## 2025-05-26 RX ORDER — SODIUM,POTASSIUM PHOSPHATES 280-250MG
1 POWDER IN PACKET (EA) ORAL
Status: COMPLETED | OUTPATIENT
Start: 2025-05-26 | End: 2025-05-26

## 2025-05-26 RX ORDER — SODIUM,POTASSIUM PHOSPHATES 280-250MG
1 POWDER IN PACKET (EA) ORAL
Qty: 3 PACKET | Refills: 0 | Status: SHIPPED | OUTPATIENT
Start: 2025-05-26

## 2025-05-26 RX ORDER — SODIUM,POTASSIUM PHOSPHATES 280-250MG
1 POWDER IN PACKET (EA) ORAL
Status: DISCONTINUED | OUTPATIENT
Start: 2025-05-26 | End: 2025-05-26

## 2025-05-26 RX ORDER — MUPIROCIN 20 MG/G
OINTMENT TOPICAL 2 TIMES DAILY
Status: DISCONTINUED | OUTPATIENT
Start: 2025-05-26 | End: 2025-05-26 | Stop reason: HOSPADM

## 2025-05-26 RX ORDER — TRAZODONE HYDROCHLORIDE 50 MG/1
25 TABLET ORAL NIGHTLY
Qty: 45 TABLET | Refills: 3 | Status: SHIPPED | OUTPATIENT
Start: 2025-05-26

## 2025-05-26 RX ADMIN — WARFARIN SODIUM 1.25 MG: 2.5 TABLET ORAL at 06:05

## 2025-05-26 RX ADMIN — MUPIROCIN: 20 OINTMENT TOPICAL at 09:05

## 2025-05-26 RX ADMIN — HYDRALAZINE HYDROCHLORIDE 50 MG: 50 TABLET ORAL at 05:05

## 2025-05-26 RX ADMIN — AMIODARONE HYDROCHLORIDE 400 MG: 200 TABLET ORAL at 08:05

## 2025-05-26 RX ADMIN — VANCOMYCIN HYDROCHLORIDE 125 MG: KIT at 05:05

## 2025-05-26 RX ADMIN — PREGABALIN 100 MG: 50 CAPSULE ORAL at 08:05

## 2025-05-26 RX ADMIN — VENLAFAXINE 37.5 MG: 37.5 TABLET ORAL at 08:05

## 2025-05-26 RX ADMIN — PSYLLIUM HUSK 2 PACKET: 3.4 POWDER ORAL at 09:05

## 2025-05-26 RX ADMIN — Medication 1000 UNITS: at 08:05

## 2025-05-26 RX ADMIN — POTASSIUM & SODIUM PHOSPHATES POWDER PACK 280-160-250 MG 1 PACKET: 280-160-250 PACK at 11:05

## 2025-05-26 RX ADMIN — Medication 1 CAPSULE: at 08:05

## 2025-05-26 NOTE — PLAN OF CARE
Pt free of falls and injury. Pt denies any pain or discomfort. Pt AAOx4. Fall precautions remain in place. LVAD hum present and smooth. VAD numbers and dopplers WDL. DLES dressing CDI. Plan of care reviewed with pt.       Problem: Adult Inpatient Plan of Care  Goal: Plan of Care Review  Outcome: Progressing  Goal: Patient-Specific Goal (Individualized)  Outcome: Progressing  Goal: Optimal Comfort and Wellbeing  Outcome: Progressing     Problem: Infection  Goal: Absence of Infection Signs and Symptoms  Outcome: Progressing     Problem: Ventricular Assist Device  Goal: Absence of Bleeding  Outcome: Progressing  Goal: Absence of Infection Signs and Symptoms  Outcome: Progressing     Problem: Fall Injury Risk  Goal: Absence of Fall and Fall-Related Injury  Outcome: Progressing

## 2025-05-26 NOTE — PROGRESS NOTES
05/25/2025  Erasmo Hooper    Current provider:  Sajan Hurley, *    Device interrogation:      5/25/2025    12:00 PM 5/25/2025     7:31 AM 5/25/2025     4:58 AM 5/25/2025     1:04 AM 5/24/2025     4:07 PM 5/24/2025    12:00 PM 5/24/2025     8:00 AM   TXP LVAD INTERROGATIONS   Type HeartMate3 HeartMate3 HeartMate3 HeartMate3 HeartMate3 HeartMate3 HeartMate3   Flow 4.2 3.2 3.7 3.6 3.8 3.5 4.1   Speed 5000 5000 5000 5000 5050 5050 5000   PI 3.9 7.4 5.3 5.6 4.8 6 4.4   Power (Carrington) 3.4 3.3 3.3 3.4 3.4 3.3 3.4   Pulsatility Pulse Pulse  Pulse Pulse Pulse Pulse          Rounded on [facility] to ensure all mechanical assist device settings (IABP or VAD) were appropriate and all parameters were within limits.  I was able to ensure all back up equipment was present, the staff had no issues, and the Perfusion Department daily rounding was complete.      For implantable VADs: Interrogation of Ventricular assist device was performed with analysis of device parameters and review of device function. I have personally reviewed the interrogation findings and agree with findings as stated.     In emergency, the nursing units have been notified to contact the perfusion department either by:  Calling [id number] from 630am to 4pm Mon thru Fri, utilizing the On-Call Finder functionality of Epic and searching for Perfusion, or by contacting the hospital  from 4pm to 630am and on weekends and asking to speak with the perfusionist on call.    7:44 PM

## 2025-05-26 NOTE — DISCHARGE SUMMARY
[facility] - Card[facility]  Heart Transplant  Discharge Summary      Patient Name: Radha Abbott  MRN: [id number]  Admission Date: 5/23/2025  Hospital Length of Stay: 3 days  Discharge Date and Time: 05/26/2025 3:30 PM  Attending Physician: Sajan Hurley, *   Discharging Provider: Bassem Morelos MD  Primary Care Provider: Vasu Kong MD     HPI: 64M PMHx of stage D HFrEF 2/2 ICM s/p HM3 as DT (12/1/2023), ESRD on HD (MWF), and debility here for ongoing diarrhea after treatment of c. difficile infection. Pertinent history below:    5/9-11: Patient was recently hospitalized for anemia and C-difficile infection. At that time patient received a unit of blood, hydrated for low flow alarms, hydralazine given to target MAPs to 80s with Hydral 50mg TID, then put on extended vancomycin course with taper for discharge on 5/11  5 mLs (125 mg total) q6 hours for 9 days,   5 mLs (125 mg total) q12h day for 7 days,   5 mLs (125 mg total) qdaily for 7 days,   5 mLs (125 mg total) every other day for 14 days.    5/12: Then presented for UTI symptoms for which he took Fosfomycin. The Urine cultures grew pseudomonas aeruginosa which ID recommended to forego further treatment if no longer having UTI symptoms.    5/23/25: Patient reports today for ongoing weakness and diarrhea. States that he has been taking the vancomycin and has been on the 125mg q12h dosage. He had C. diff toxin and antigen collected which both resulted negative. Otherwise his labwork has been stable with wbc 5.0, hgb 8.5, borderline K 3.4, INR 2.0. At the outside hospital he was given IV metronidazole 500mg and oral vancomycin 500mg once. He has been taking loperamide every 4 hours, and had one dose of metamucil.       * No surgery found *     Hospital Course: 64M PMHx of stage D HFrEF 2/2 ICM s/p HM3 as DT (12/1/2023), ESRD on HD (MWF), and debility here for ongoing diarrhea after treatment of c. difficile infection. This was believed to be  related to treatment of UTI with fosfomycin. ID was consulted and vancomycin PO was continued with plan to continue the taper on discharge per ID recs.  Pt had low flow alarms iso diarrhea and dehydration that improved with small fluid bolus and improvement in diarrhea.     Patient c.difficile tox and ag studies are negative. He was treated on oral vancomycin taper (125mg q6h for 9d, q12h for 7d, qdaily for 7d, qEveryOtherDay for 14d) after his 5/9-11 admission. Per ID, cont vancomycin taper. Monitor stool frequency. Since symptoms improved - no plan to transition to fidoxymycin.  Got HD before discharge. Phos 1.6 - phos binders stopped and got 2 doses of phosnak before discharge - nephrology wanted him to be discharegd with additional 3 doses as phos may decrease with HD per nephrology. This will be followed by HD/renal team as outpatient.       Goals of Care Treatment Preferences:  Code Status: Full Code    Health care agent: Pt's wifeArlyn is [employer]  Health care agent number: No value filed.                   Consults (From admission, onward)          Status Ordering Provider     Inpatient consult to Infectious Diseases  Once        Provider:  (Not yet assigned)    Completed LESLIE JARVIS     Inpatient consult to Nephrology  Once        Provider:  (Not yet assigned)    Completed LESLIE JAVRIS            Significant Diagnostic Studies: Labs: All labs within the past 24 hours have been reviewed    Pending Diagnostic Studies:       Procedure Component Value Units Date/Time    Calprotectin, Stool [[id number]]     Order Status: Sent Lab Status: No result     Specimen: Stool     Fecal fat, qualitative [[id number]]     Order Status: Sent Lab Status: No result     Specimen: Stool     Giardia / Cryptosporidum, EIA [[id number]]     Order Status: Sent Lab Status: No result     Specimen: Stool     Pancreatic elastase, fecal [[id number]]     Order Status: Sent Lab Status: No result     Specimen: Stool     Rotavirus antigen,  stool [[id number]]     Order Status: Sent Lab Status: No result     Specimen: Stool     Stool Exam-Ova,Cysts,Parasites [[id number]]     Order Status: Sent Lab Status: No result     Specimen: Stool           Final Active Diagnoses:    Diagnosis Date Noted POA    PRINCIPAL PROBLEM:  Diarrhea of presumed infectious origin [R19.7] 08/02/2024 Yes    Anemia [D64.9] 05/09/2025 Yes    ESRD (end stage renal disease) [N18.6] 01/22/2024 Yes    LVAD (left ventricular assist device) present [Z95.811] 12/01/2023 Not Applicable      Problems Resolved During this Admission:      Discharged Condition: stable    Disposition: Home or Self Care    Follow Up:    Patient Instructions:      Notify your health care provider if you experience any of the following:  temperature >100.4     Notify your health care provider if you experience any of the following:  persistent nausea and vomiting or diarrhea     Notify your health care provider if you experience any of the following:  severe persistent headache     Notify your health care provider if you experience any of the following:  persistent dizziness, light-headedness, or visual disturbances     Notify your health care provider if you experience any of the following:  increased confusion or weakness     Activity as tolerated     Medications:  Reconciled Home Medications:      Medication List        START taking these medications      potassium, sodium phosphates 280-160-250 mg Pwpk  Commonly known as: PHOS-NAK  Take 1 packet by mouth 4 (four) times daily before meals and nightly.     psyllium husk 3.4 gram Pwpk packet  Commonly known as: METAMUCIL  Take 2 packets by mouth 2 (two) times daily.            CHANGE how you take these medications      vancomycin 25 mg/mL oral solution  Take 5 mLs (125 mg total) 2 (two) times a day until 5/27, THEN 5 mLs (125 mg total) once daily for 7 days, THEN 5 mLs (125 mg total) every other day for 14 days.  What changed:   how to take this  additional  instructions            CONTINUE taking these medications      amiodarone 400 MG tablet  Commonly known as: PACERONE  Take 1 tablet (400 mg total) by mouth once daily.     atorvastatin 40 MG tablet  Commonly known as: LIPITOR  Take 1 tablet (40 mg total) by mouth every evening.     Fish OiL 1,000 (120-180) mg Cap  Generic drug: omega 3-dha-epa-fish oil  Take 1 capsule by mouth once daily.     hydrALAZINE 50 MG tablet  Commonly known as: APRESOLINE  Take 1 tablet (50 mg total) by mouth every 8 (eight) hours.     miscellaneous medical supply Kit  by Apply Externally route 2 (two) times a day. Use twice daily at 8 AM and 3 PM and record the value in [employer] as directed.     pregabalin 100 MG capsule  Commonly known as: LYRICA  Take 1 capsule by mouth every other day.     traZODone 50 MG tablet  Commonly known as: DESYREL  Take 0.5 tablets (25 mg total) by mouth every evening.     venlafaxine 37.5 MG Tab  Commonly known as: EFFEXOR  Take 1 tablet (37.5 mg total) by mouth once daily.     vitamin D 1000 units Tab  Commonly known as: VITAMIN D3  Take 1 tablet by mouth once daily     warfarin 2.5 MG tablet  Commonly known as: COUMADIN  Take 1.25 mg orally daily            STOP taking these medications      amitriptyline 25 MG tablet  Commonly known as: ELAVIL     megestroL 40 MG Tab  Commonly known as: MEGACE     sevelamer carbonate 800 mg Tab  Commonly known as: MARION Morelos MD  Heart Transplant  [facility] - Cardiac Int[facility]

## 2025-05-26 NOTE — PROGRESS NOTES
05/26/2025  Monet Aguiar    Current provider:  Sajan Hurley, *    Device interrogation:      5/26/2025     8:22 AM 5/26/2025     4:55 AM 5/25/2025    11:10 PM 5/25/2025     8:22 PM 5/25/2025    12:00 PM 5/25/2025     7:31 AM 5/25/2025     4:58 AM   TXP LVAD INTERROGATIONS   Type HeartMate3 HeartMate3 HeartMate3 HeartMate3 HeartMate3 HeartMate3 HeartMate3   Flow 3.4 3.5 3.6 3.7 4.2 3.2 3.7   Speed 5000 5000 5000 5000 5000 5000 5000   PI 6.3 6 6.1 5.5 3.9 7.4 5.3   Power (Carrington) 3.4 3.3 3.3 3.3 3.4 3.3 3.3   LSL 4600 4600 4600 4600      Pulsatility Pulse  Intermittent pulse Pulse Pulse Pulse           Rounded on [facility] to ensure all mechanical assist device settings (IABP or VAD) were appropriate and all parameters were within limits.  I was able to ensure all back up equipment was present, the staff had no issues, and the Perfusion Department daily rounding was complete.      For implantable VADs: Interrogation of Ventricular assist device was performed with analysis of device parameters and review of device function. I have personally reviewed the interrogation findings and agree with findings as stated.     In emergency, the nursing units have been notified to contact the perfusion department either by:  Calling [id number] from 630am to 4pm Mon thru Fri, utilizing the On-Call Finder functionality of Epic and searching for Perfusion, or by contacting the hospital  from 4pm to 630am and on weekends and asking to speak with the perfusionist on call.    10:26 AM

## 2025-05-26 NOTE — HOSPITAL COURSE
64M PMHx of stage D HFrEF 2/2 ICM s/p HM3 as DT (12/1/2023), ESRD on HD (MWF), and debility here for ongoing diarrhea after treatment of c. difficile infection. This was believed to be related to treatment of UTI with fosfomycin. ID was consulted and vancomycin PO was continued with plan to continue the taper on discharge per ID recs.  Pt had low flow alarms iso diarrhea and dehydration that improved with small fluid bolus and improvement in diarrhea.     Patient c.difficile tox and ag studies are negative. He was treated on oral vancomycin taper (125mg q6h for 9d, q12h for 7d, qdaily for 7d, qEveryOtherDay for 14d) after his 5/9-11 admission. Per ID, cont vancomycin taper. Monitor stool frequency. Since symptoms improved - no plan to transition to fidoxymycin.  Got HD before discharge. Phos 1.6 - phos binders stopped and got 2 doses of phosnak before discharge - nephrology wanted him to be discharegd with additional 3 doses as phos may decrease with HD per nephrology. This will be followed by HD/renal team as outpatient.

## 2025-05-26 NOTE — ASSESSMENT & PLAN NOTE
ESRD on iHD MWF  [facility] ([facility])  3 hours  EDW:  82.1 kg  RI TDC  Last HD prior to presentation 5/23/25.      Plan/Recommendations:  -No emergent need for RRT at this time. HD today to resume MWF schedule.  -plan to continue 3x week dialysis while IP.  -OK to continue regular diet, phos remains low, K stable. Recommend phos repletion, hold phos binders. May f/u with OP nephrology to determine need to restart binders as appetite improves.   -1.5L fluid restrictions  -continue strict I/O's  -hold phos binder until Phos > 5     Anemia of ESRD  -On Micera as OP q 2 weeks;  defer VALENTIN to his OP dialysis unit  -PRN transfusion per primary team

## 2025-05-26 NOTE — PROGRESS NOTES
Bedside HD initiated, / no fluid to be removed. , uf fro prime and rinse only as tolerated. B/P 88/D , pulse 83, o2 sat 92 of room air. Patient awake and alert,

## 2025-05-26 NOTE — CARE UPDATE
Unit E[facility] Care Support Interaction      I have reviewed the chart of Radha Abbott who is hospitalized for <principal problem not specified>. The patient is currently located in the following unit: CSU        I have assisted the primary physician in management of the following:      Central Line - Present on admission to the unit, out of testing window   C. difficile - Diarrhea present on admission and tested within 48   MRSA Decolonization - Mupirocin ordered and CHG ordered         Vero Lozada PA-C  Unit Based [facility]

## 2025-05-26 NOTE — ASSESSMENT & PLAN NOTE
Received 1 unit pRBC in early May for hgb to 6.8. Hgb is 8.5 on admit, dec to 7.5 next day. BP stable. No visible blood in stool.  His INR is 2 and no signs of acute bleeding  CTM  -iron studies pending   -hgb stable/improved to 8.2 /27.6

## 2025-05-26 NOTE — PROGRESS NOTES
DISCHARGE NOTE:    Radha Abbott is a 65 y.o. male s/p HM3 VAD admitted for ongoing diarrhea after treatment for CDIF. He was discharged after diarrhea decreased and repeat CDIF was negative. He was sent home on psyllium and previous vancomycin taper.     Pharmacy Interventions/Recommendations:  1) INR Goal: 2-3     2) Antiplatelet Agents: none     3) Heparin Bridging:  Yes    4) INR Follow-Up/Discharge Needs:  Repeat INR Wednesday.     See list of discharge medication for dosing instructions.     Radha Abbott and his caregiver verbalized their understanding and had the opportunity to ask questions.      Discharge Medications:     Medication List        START taking these medications      potassium, sodium phosphates 280-160-250 mg Pwpk  Commonly known as: PHOS-NAK  Take 1 packet by mouth 4 (four) times daily before meals and nightly.     psyllium husk 3.4 gram Pwpk packet  Commonly known as: METAMUCIL  Take 2 packets by mouth 2 (two) times daily.            CHANGE how you take these medications      vancomycin 25 mg/mL oral solution  Take 5 mLs (125 mg total) 2 (two) times a day until 5/27, THEN 5 mLs (125 mg total) once daily for 7 days, THEN 5 mLs (125 mg total) every other day for 14 days.  What changed:   how to take this  additional instructions            CONTINUE taking these medications      amiodarone 400 MG tablet  Commonly known as: PACERONE  Take 1 tablet (400 mg total) by mouth once daily.     atorvastatin 40 MG tablet  Commonly known as: LIPITOR  Take 1 tablet (40 mg total) by mouth every evening.     Fish OiL 1,000 (120-180) mg Cap  Generic drug: omega 3-dha-epa-fish oil     hydrALAZINE 50 MG tablet  Commonly known as: APRESOLINE  Take 1 tablet (50 mg total) by mouth every 8 (eight) hours.     miscellaneous medical supply Kit  by Apply Externally route 2 (two) times a day. Use twice daily at 8 AM and 3 PM and record the value in [employer] as directed.     pregabalin 100 MG capsule  Commonly known as:  LYRICA     traZODone 50 MG tablet  Commonly known as: DESYREL  Take 0.5 tablets (25 mg total) by mouth every evening.     venlafaxine 37.5 MG Tab  Commonly known as: EFFEXOR  Take 1 tablet (37.5 mg total) by mouth once daily.     vitamin D 1000 units Tab  Commonly known as: VITAMIN D3  Take 1 tablet by mouth once daily     warfarin 2.5 MG tablet  Commonly known as: COUMADIN  Take 1.25 mg orally daily            STOP taking these medications      amitriptyline 25 MG tablet  Commonly known as: ELAVIL     megestroL 40 MG Tab  Commonly known as: MEGACE     sevelamer carbonate 800 mg Tab  Commonly known as: RENVELA               Where to Get Your Medications        These medications were sent to [id number] - [place] - [address]  [address] [address]      Phone: [phone]   potassium, sodium phosphates 280-160-250 mg Pwpk  traZODone 50 MG tablet       Information about where to get these medications is not yet available    Ask your nurse or doctor about these medications  psyllium husk 3.4 gram Pwpk packet  vancomycin 25 mg/mL oral solution

## 2025-05-26 NOTE — PROGRESS NOTES
[facility] - Cardiac Intensive Care  Nephrology  Progress Note    Patient Name: Radha Abbott  MRN: [id number]  Admission Date: 5/23/2025  Hospital Length of Stay: 3 days  Attending Provider: Sajan Hurley, *   Primary Care Physician: Vasu Kong MD  Principal Problem:<principal problem not specified>    Subjective:     HPI:  64 y/o AAM w/ PMHx of stage D HFrEF 2/2 ICM s/p HM3 as DT (12/1/2023), ESRD on HD (MWF), and debility who presented to the emergency department due to diarrhea. Recent admission for CDAD on prolonged vanco taper here for diarrhea. Last HD prior to presentation was yesterday 5/23. Nephrology consulted for ESRD.         Interval History: NAEON. Electrolytes stable. Due for HD today. Diarrhea improved.     Review of patient's allergies indicates:  No Known Allergies  Current Facility-Administered Medications   Medication Frequency    amiodarone tablet 400 mg Daily    amitriptyline tablet 25 mg Once per day on Monday Wednesday Friday    atorvastatin tablet 40 mg QHS    hydrALAZINE tablet 50 mg Q8H    mupirocin 2 % ointment BID    omega 3-dha-epa-fish oil capsule 1 capsule Daily    oxyCODONE immediate release tablet 5 mg BID PRN    pregabalin capsule 100 mg Every other day    psyllium husk packet 2 packet BID    sodium chloride 0.9% bolus 250 mL 250 mL Once    trazodone split tablet 25 mg QHS    vancomycin 125 mg/5 mL oral solution 125 mg Q6H    venlafaxine tablet 37.5 mg Daily    vitamin D 1000 units tablet 1,000 Units Daily    warfarin (COUMADIN) split tablet 1.25 mg Once per day on Sunday Monday Tuesday Wednesday Friday Saturday    [START ON 5/29/2025] warfarin (COUMADIN) tablet 2.5 mg Every Thursday       Objective:     Vital Signs (Most Recent):  Temp: 98.9 °F (37.2 °C) (05/26/25 1010)  Pulse: 87 (05/26/25 1143)  Resp: 18 (05/26/25 1010)  BP: (!) 82/0 (05/26/25 1010)  SpO2: (!) 92 % (05/26/25 1100) Vital Signs (24h Range):  Temp:  [98.8 °F (37.1 °C)-98.9 °F (37.2 °C)] 98.9 °F (37.2  °C)  Pulse:  [81-87] 87  Resp:  [17-18] 18  SpO2:  [92 %-98 %] 92 %  BP: (80-88)/(0) 82/0     Weight: 85 kg (187 lb 6.3 oz) (05/26/25 0459)  Body mass index is 24.72 kg/m².  Body surface area is 2.09 meters squared.    I/O last 3 completed shifts:  In: -   Out: 1 [Stool:1]     Physical Exam  Vitals and nursing note reviewed.   Constitutional:       General: He is not in acute distress.     Appearance: He is not ill-appearing.   HENT:      Head: Normocephalic.   Eyes:      Conjunctiva/sclera: Conjunctivae normal.   Abdominal:      General: There is no distension.      Palpations: Abdomen is soft.   Musculoskeletal:      Right lower leg: No edema.      Left lower leg: No edema.   Skin:     General: Skin is warm and dry.      Coloration: Skin is not jaundiced.   Neurological:      Mental Status: He is alert.   Psychiatric:         Mood and Affect: Mood normal.          Significant Labs:  CBC:   Recent Labs   Lab 05/26/25  0430   WBC 6.04   RBC 3.33*   HGB 8.2*   HCT 27.6*      MCV 83   MCH 24.6*   MCHC 29.7*     CMP:   Recent Labs   Lab 05/23/25  1402 05/24/25  0552 05/26/25  0430   *   < > 106   CALCIUM 8.0*   < > 7.7*   ALBUMIN 2.4*  --   --    PROT 7.1  --   --       < > 139   K 3.4*   < > 4.1   CO2 25   < > 21*      < > 107   BUN 7*   < > 23   CREATININE 3.4*   < > 6.5*   ALKPHOS 94  --   --    ALT 32  --   --    AST 31  --   --    BILITOT 0.5  --   --     < > = values in this interval not displayed.     All labs within the past 24 hours have been reviewed.     Assessment/Plan:     Cardiac/Vascular  LVAD (left ventricular assist device) present  -per primary     Renal/  ESRD (end stage renal disease)  ESRD on iHD MWF  [facility] ([facility])  3 hours  EDW:  82.1 kg  RI TD  Last HD prior to presentation 5/23/25.      Plan/Recommendations:  -No emergent need for RRT at this time. HD today to resume MWF schedule.  -plan to continue 3x week dialysis while IP.  -OK to continue regular diet, phos  remains low, K stable. Recommend phos repletion, hold phos binders. May f/u with OP nephrology to determine need to restart binders as appetite improves.   -1.5L fluid restrictions  -continue strict I/O's  -hold phos binder until Phos > 5     Anemia of ESRD  -On Micera as OP q 2 weeks;  defer VALENTIN to his OP dialysis unit  -PRN transfusion per primary team    GI  Diarrhea of presumed infectious origin  - per primary         Thank you for your consult. I will follow-up with patient. Please contact us if you have any additional questions.    Malina Rivas PA-C  Nephrology  [facility] - Cardiac Int[facility]

## 2025-05-26 NOTE — SUBJECTIVE & OBJECTIVE
Interval History: pt without BM ovn. Had formed BM this am. Cont po vancomycin with taper, discussed with ID as plan for discharge - plan to d.c after HD - family and patient also want to be discharged as feeling much better.     No more LFA in last > 24 hrs. The LFAs were likely in setting of diarrhea which is better now and he  Will cont to monitor for additional LFA.       Plan to d/c after HD.       Continuous Infusions:  Scheduled Meds:   amiodarone  400 mg Oral Daily    amitriptyline  25 mg Oral Once per day on Monday Wednesday Friday    atorvastatin  40 mg Oral QHS    hydrALAZINE  50 mg Oral Q8H    mupirocin   Nasal BID    omega 3-dha-epa-fish oil  1 capsule Oral Daily    pregabalin  100 mg Oral Every other day    psyllium husk  2 packet Oral BID    sodium chloride 0.9%  250 mL Intravenous Once    traZODone  25 mg Oral QHS    vancomycin  125 mg Oral Q6H    venlafaxine  37.5 mg Oral Daily    vitamin D  1,000 Units Oral Daily    warfarin  1.25 mg Oral Once per day on Sunday Monday Tuesday Wednesday Friday Saturday    [START ON 5/29/2025] warfarin  2.5 mg Oral Every Thursday     PRN Meds:  Current Facility-Administered Medications:     oxyCODONE, 5 mg, Oral, BID PRN    Review of patient's allergies indicates:  No Known Allergies  Objective:     Vital Signs (Most Recent):  Temp: 98.9 °F (37.2 °C) (05/26/25 1010)  Pulse: 87 (05/26/25 1143)  Resp: 18 (05/26/25 1010)  BP: (!) 82/0 (05/26/25 1010)  SpO2: (!) 92 % (05/26/25 1100) Vital Signs (24h Range):  Temp:  [98.8 °F (37.1 °C)-98.9 °F (37.2 °C)] 98.9 °F (37.2 °C)  Pulse:  [81-87] 87  Resp:  [17-18] 18  SpO2:  [92 %-98 %] 92 %  BP: (80-88)/(0) 82/0     Patient Vitals for the past 72 hrs (Last 3 readings):   Weight   05/26/25 0459 85 kg (187 lb 6.3 oz)   05/23/25 2330 86.5 kg (190 lb 11.2 oz)     Body mass index is 24.72 kg/m².      Intake/Output Summary (Last 24 hours) at 5/26/2025 1201  Last data filed at 5/25/2025 1258  Gross per 24 hour   Intake --   Output 1 ml  "  Net -1 ml       Hemodynamic Parameters:       Telemetry: reviewed       Physical Exam  Vitals and nursing note reviewed.   Constitutional:       Appearance: He is normal weight.   HENT:      Head: Normocephalic and atraumatic.      Right Ear: External ear normal.      Left Ear: External ear normal.      Nose: Nose normal.      Mouth/Throat:      Mouth: Mucous membranes are moist.      Pharynx: Oropharynx is clear.   Eyes:      Conjunctiva/sclera: Conjunctivae normal.   Cardiovascular:      Pulses: Normal pulses.      Heart sounds: Normal heart sounds.      Comments: Nl vad hum  Pulmonary:      Effort: Pulmonary effort is normal.      Breath sounds: Normal breath sounds.   Abdominal:      General: Abdomen is flat. Bowel sounds are normal.      Palpations: Abdomen is soft.   Musculoskeletal:      Cervical back: Normal range of motion.      Right lower leg: No edema.      Left lower leg: No edema.   Neurological:      Mental Status: He is alert.            Significant Labs:  CBC:  Recent Labs   Lab 05/24/25  0552 05/25/25 0349 05/26/25  0430   WBC 4.31 4.76 6.04   RBC 3.11* 3.39* 3.33*   HGB 7.5* 8.1* 8.2*   HCT 25.6* 28.1* 27.6*    225 236   MCV 82 83 83   MCH 24.1* 23.9* 24.6*   MCHC 29.3* 28.8* 29.7*     BNP:  No results for input(s): "BNP" in the last 168 hours.    Invalid input(s): "BNPTRIAGELBLO"  CMP:  Recent Labs   Lab 05/23/25  1402 05/24/25  0552 05/25/25  0349 05/25/25 2013 05/26/25  0430   *   < > 109 162* 106   CALCIUM 8.0*   < > 7.7* 7.6* 7.7*   ALBUMIN 2.4*  --   --   --   --    PROT 7.1  --   --   --   --       < > 139 138 139   K 3.4*   < > 3.8 4.0 4.1   CO2 25   < > 24 23 21*      < > 105 106 107   BUN 7*   < > 15 19 23   CREATININE 3.4*   < > 5.5* 6.1* 6.5*   ALKPHOS 94  --   --   --   --    ALT 32  --   --   --   --    AST 31  --   --   --   --    BILITOT 0.5  --   --   --   --     < > = values in this interval not displayed.      Coagulation:   Recent Labs   Lab " "05/23/25  1519 05/24/25  0552 05/25/25  0349 05/26/25  0430   INR 2.5* 3.0* 2.9* 2.5*   APTT 37.5*  --   --   --      LDH:  No results for input(s): "LDH" in the last 72 hours.  Microbiology:  Microbiology Results (last 7 days)       Procedure Component Value Units Date/Time    Stool culture [[id number]]     Order Status: Sent Specimen: Stool             I have reviewed all pertinent labs within the past 24 hours.    Estimated Creatinine Clearance: 13.1 mL/min (A) (based on SCr of 6.5 mg/dL (H)).    Diagnostic Results:  I have reviewed and interpreted all pertinent imaging results/findings within the past 24 hours.  "

## 2025-05-26 NOTE — SUBJECTIVE & OBJECTIVE
Interval History: NAEON. Electrolytes stable. Due for HD today. Diarrhea improved.     Review of patient's allergies indicates:  No Known Allergies  Current Facility-Administered Medications   Medication Frequency    amiodarone tablet 400 mg Daily    amitriptyline tablet 25 mg Once per day on Monday Wednesday Friday    atorvastatin tablet 40 mg QHS    hydrALAZINE tablet 50 mg Q8H    mupirocin 2 % ointment BID    omega 3-dha-epa-fish oil capsule 1 capsule Daily    oxyCODONE immediate release tablet 5 mg BID PRN    pregabalin capsule 100 mg Every other day    psyllium husk packet 2 packet BID    sodium chloride 0.9% bolus 250 mL 250 mL Once    trazodone split tablet 25 mg QHS    vancomycin 125 mg/5 mL oral solution 125 mg Q6H    venlafaxine tablet 37.5 mg Daily    vitamin D 1000 units tablet 1,000 Units Daily    warfarin (COUMADIN) split tablet 1.25 mg Once per day on Sunday Monday Tuesday Wednesday Friday Saturday    [START ON 5/29/2025] warfarin (COUMADIN) tablet 2.5 mg Every Thursday       Objective:     Vital Signs (Most Recent):  Temp: 98.9 °F (37.2 °C) (05/26/25 1010)  Pulse: 87 (05/26/25 1143)  Resp: 18 (05/26/25 1010)  BP: (!) 82/0 (05/26/25 1010)  SpO2: (!) 92 % (05/26/25 1100) Vital Signs (24h Range):  Temp:  [98.8 °F (37.1 °C)-98.9 °F (37.2 °C)] 98.9 °F (37.2 °C)  Pulse:  [81-87] 87  Resp:  [17-18] 18  SpO2:  [92 %-98 %] 92 %  BP: (80-88)/(0) 82/0     Weight: 85 kg (187 lb 6.3 oz) (05/26/25 0459)  Body mass index is 24.72 kg/m².  Body surface area is 2.09 meters squared.    I/O last 3 completed shifts:  In: -   Out: 1 [Stool:1]     Physical Exam  Vitals and nursing note reviewed.   Constitutional:       General: He is not in acute distress.     Appearance: He is not ill-appearing.   HENT:      Head: Normocephalic.   Eyes:      Conjunctiva/sclera: Conjunctivae normal.   Abdominal:      General: There is no distension.      Palpations: Abdomen is soft.   Musculoskeletal:      Right lower leg: No edema.       Left lower leg: No edema.   Skin:     General: Skin is warm and dry.      Coloration: Skin is not jaundiced.   Neurological:      Mental Status: He is alert.   Psychiatric:         Mood and Affect: Mood normal.          Significant Labs:  CBC:   Recent Labs   Lab 05/26/25  0430   WBC 6.04   RBC 3.33*   HGB 8.2*   HCT 27.6*      MCV 83   MCH 24.6*   MCHC 29.7*     CMP:   Recent Labs   Lab 05/23/25  1402 05/24/25  0552 05/26/25  0430   *   < > 106   CALCIUM 8.0*   < > 7.7*   ALBUMIN 2.4*  --   --    PROT 7.1  --   --       < > 139   K 3.4*   < > 4.1   CO2 25   < > 21*      < > 107   BUN 7*   < > 23   CREATININE 3.4*   < > 6.5*   ALKPHOS 94  --   --    ALT 32  --   --    AST 31  --   --    BILITOT 0.5  --   --     < > = values in this interval not displayed.     All labs within the past 24 hours have been reviewed.

## 2025-05-26 NOTE — ASSESSMENT & PLAN NOTE
Stage D HFrEF 2/2 ICM s/p HM3 as DT (12/1/2023). Not decompensated.  Blood thinner:Pharmacy managing  Hydral 50mg q8h. Previously had low flow alarms with high maps and anemia    Procedure: Device Interrogation Including analysis of device parameters  Current Settings: Ventricular Assist Device  Review of device function is stable/unstable stable      5/26/2025     8:22 AM 5/26/2025     4:55 AM 5/25/2025    11:10 PM 5/25/2025     8:22 PM 5/25/2025    12:00 PM 5/25/2025     7:31 AM 5/25/2025     4:58 AM   TXP LVAD INTERROGATIONS   Type HeartMate3 HeartMate3 HeartMate3 HeartMate3 HeartMate3 HeartMate3 HeartMate3   Flow 3.4 3.5 3.6 3.7 4.2 3.2 3.7   Speed 5000 5000 5000 5000 5000 5000 5000   PI 6.3 6 6.1 5.5 3.9 7.4 5.3   Power ([place]) 3.4 3.3 3.3 3.3 3.4 3.3 3.3   LSL 4600 4600 4600 4600      Pulsatility Pulse  Intermittent pulse Pulse Pulse Pulse

## 2025-05-26 NOTE — PROGRESS NOTES
Admit/Discharge Note      Met with patient and spouse to assess patients needs. Patient is a 65 y.o.  male admitted for Diarrhea and weakness per medical record. Pt received LVAD in 2023. Pt's spouse does pt's dressing changes.    Patient admitted on 5/23/2025. At this time, patient and wife presents as alert and oriented x 4, pleasant, good eye contact, concentration/judgement good, average intelligence, calm, communicative, cooperative, and asking and answering questions appropriately.      Household/Family Systems    Patient resides with his spouse at:    [address] [facility] [place] [address]    Pt's cell: [phone]    Additional contacts:  Arlyn Abbott, spouse, [phone]/M or [phone]/W  Teetee Zhou, sister, [phone] (retired and assist pt while spouse is at work)    Support system includes pt's spouse and sister Teetee. Pt has 4 adult children from a previous relationship that reside in [place], [place] (20 mins away from pt). Pt's spouse also has an adult son from a previous relationship that resides in [place], [place]. Patient does not have dependents that are need of being cared for.     Patients primary caregiver is spouse. Pt's spouse is employed full-time at [employer] as a Pt Access Rep.     During admission, patient's caregiver plans to stay in patient's room. Confirmed patient and patients caregivers do have access to reliable transportation.    Cognitive Status/Learning     Patients caregiver reports patients reading ability as 12th grade and states patient does not have difficulty with reading, writing, seeing, hearing, comprehension, learning, and memory. Pt wears bifocal eyeglasses for reading. Patient reports patient learns best by multiple methods: audio, visual, and hands-on.   Needed: No.   Highest education level: High School (9-12) or GED    Vocation/Disability    Working for Income: No  If no, reason not working: Disability  Pt is disabled due to HF  since . Prior to disability, pt was a self-employed .    Adherence     Patients caregiver reports patient has a high level of adherence to his health care regimen. Adherence counseling and education provided. Patient verbalizes understanding.    Substance Use    Patient reports patients substance usage as the following:    Tobacco: Pt confirms he quit smoking cigarettes in .  Alcohol: Pt denies current use.  Illicit Drugs/Non-prescribed Medications: none, patient denies any use.    Patients caregiver states clear understanding of the potential impact of substance use. Substance abstinence/cessation counseling, education and resources provided and reviewed.     Services Utilizing/ADLS    Infusion Service: Prior to admission, patient utilizing? no Utilized [facility] ([phone], [phone]/F) in the past   Home Health: Prior to admission, patient utilizing? no Utilized [facility] ([phone]) in the past. Pt  going to [facility] on  and . [phone]  DME: Prior to admission, yes Pt has a RW, Rollator, PRW, WC, and BSC at home.  Pulmonary/Cardiac Rehab: Prior to admission, no  Dialysis:  Prior to admission, yes OP HD at [facility], 6:30 am shift, ph [phone], fax [phone]  Transplant Specialty Pharmacy:  Prior to admission, no.    Prior to admission, patient reports patient was not independent with ADLS and was not driving.  Patient reports patient is able to care for self at this time. Patient reports patient indicates a willingness to care for self once medically cleared to do so.    Insurance/Medications    Insured by   Payer/Plan Subscr  Sex Relation Sub. Ins. ID Effective Group Num   1. MEDICAID - ALISON BUCK 1960 Male Self [id number]                                   [address] [address]   2. [facility] - [facility]* AASHISHALISON 1960 Male Self [id number] 10/9/23                                    PO       Primary  Insurance (for UNOS reporting): Public Insurance - Medicaid  Secondary Insurance (for UNOS reporting): None    Patient reports patient is able to obtain and afford medications at this time and at time of discharge.    Living Will/Healthcare Power of     Patients caregiver reports patient has a LW and/or HCPA.  Pt's spouse is sole agent. Documents under Media tab in Epic.  provided education regarding LW and HCPA and the completion of forms.    Coping/Mental Health     Pt denies mental health concerns at this time Pt reports coping well. Pt states he takes walks, watches TV and speaks on phone with family and friends.    Discharge Planning    At time of discharge, patient plans to return to his home under the care of spouse and sister Teetee. Patients spouse will transport patient. Per rounds today, expected discharge date is today, following HD. Patient and pt's caretaker verbalize understanding and is involved in treatment planning and discharge process. Pt states he will resume [facility] and resume HD at [facility] this week.     Additional Concerns    Patient is being followed for needs, education, resources, information, emotional support, supportive counseling, and for supportive and skilled discharge plan of care.  providing ongoing psychosocial support, education, resources and d/c planning as needed.  SW remains available. Patient denies additional needs and/or concerns at this time. Patient verbalizes understanding and agreement with information reviewed, social work availability, and how to access available resources as needed.

## 2025-05-26 NOTE — ASSESSMENT & PLAN NOTE
Improved    Patient c.difficile tox and ag studies are negative. He was treated on oral vancomycin taper (125mg q6h for 9d, q12h for 7d, qdaily for 7d, qEveryOtherDay for 14d) after his 5/9-11 admission and is most recently on the q12h dosing which he has been compliant with. 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  -Stool studies: culture, Ova & Parasites, Giardia/crypto, Rotavirus, Calprotectin, Fecal Fat, Pancreatic Elastase   -Metamucil BID   -ID consult, appreciate recs. Received oral vanc 500mg and IV metro 500mg on 5/23/25 evening prior to transfer.   -per ID, cont vancomycin taper. Monitor stool frequency. Since symptoms improved - no plan to transition to fidoxymycin

## 2025-05-26 NOTE — PROGRESS NOTES
[facility] - Cardiac Intensive Care  Heart Transplant  Progress Note    Patient Name: Radha Abbott  MRN: [id number]  Admission Date: 5/23/2025  Hospital Length of Stay: 3 days  Attending Physician: Sajan Hurley, *  Primary Care Provider: Vasu Kong MD  Principal Problem:<principal problem not specified>    Subjective:   Interval History: pt without BM ovn. Had formed BM this am. Cont po vancomycin with taper, discussed with ID as plan for discharge - plan to d.c after HD - family and patient also want to be discharged as feeling much better.     No more LFA in last > 24 hrs. The LFAs were likely in setting of diarrhea which is better now and he  Will cont to monitor for additional LFA.       Plan to d/c after HD.       Continuous Infusions:  Scheduled Meds:   amiodarone  400 mg Oral Daily    amitriptyline  25 mg Oral Once per day on Monday Wednesday Friday    atorvastatin  40 mg Oral QHS    hydrALAZINE  50 mg Oral Q8H    mupirocin   Nasal BID    omega 3-dha-epa-fish oil  1 capsule Oral Daily    pregabalin  100 mg Oral Every other day    psyllium husk  2 packet Oral BID    sodium chloride 0.9%  250 mL Intravenous Once    traZODone  25 mg Oral QHS    vancomycin  125 mg Oral Q6H    venlafaxine  37.5 mg Oral Daily    vitamin D  1,000 Units Oral Daily    warfarin  1.25 mg Oral Once per day on Sunday Monday Tuesday Wednesday Friday Saturday    [START ON 5/29/2025] warfarin  2.5 mg Oral Every Thursday     PRN Meds:  Current Facility-Administered Medications:     oxyCODONE, 5 mg, Oral, BID PRN    Review of patient's allergies indicates:  No Known Allergies  Objective:     Vital Signs (Most Recent):  Temp: 98.9 °F (37.2 °C) (05/26/25 1010)  Pulse: 87 (05/26/25 1143)  Resp: 18 (05/26/25 1010)  BP: (!) 82/0 (05/26/25 1010)  SpO2: (!) 92 % (05/26/25 1100) Vital Signs (24h Range):  Temp:  [98.8 °F (37.1 °C)-98.9 °F (37.2 °C)] 98.9 °F (37.2 °C)  Pulse:  [81-87] 87  Resp:  [17-18] 18  SpO2:  [92 %-98 %] 92 %  BP:  "(80-88)/(0) 82/0     Patient Vitals for the past 72 hrs (Last 3 readings):   Weight   05/26/25 0459 85 kg (187 lb 6.3 oz)   05/23/25 2330 86.5 kg (190 lb 11.2 oz)     Body mass index is 24.72 kg/m².      Intake/Output Summary (Last 24 hours) at 5/26/2025 1201  Last data filed at 5/25/2025 1258  Gross per 24 hour   Intake --   Output 1 ml   Net -1 ml       Hemodynamic Parameters:       Telemetry: reviewed       Physical Exam  Vitals and nursing note reviewed.   Constitutional:       Appearance: He is normal weight.   HENT:      Head: Normocephalic and atraumatic.      Right Ear: External ear normal.      Left Ear: External ear normal.      Nose: Nose normal.      Mouth/Throat:      Mouth: Mucous membranes are moist.      Pharynx: Oropharynx is clear.   Eyes:      Conjunctiva/sclera: Conjunctivae normal.   Cardiovascular:      Pulses: Normal pulses.      Heart sounds: Normal heart sounds.      Comments: Nl vad hum  Pulmonary:      Effort: Pulmonary effort is normal.      Breath sounds: Normal breath sounds.   Abdominal:      General: Abdomen is flat. Bowel sounds are normal.      Palpations: Abdomen is soft.   Musculoskeletal:      Cervical back: Normal range of motion.      Right lower leg: No edema.      Left lower leg: No edema.   Neurological:      Mental Status: He is alert.            Significant Labs:  CBC:  Recent Labs   Lab 05/24/25  0552 05/25/25 0349 05/26/25  0430   WBC 4.31 4.76 6.04   RBC 3.11* 3.39* 3.33*   HGB 7.5* 8.1* 8.2*   HCT 25.6* 28.1* 27.6*    225 236   MCV 82 83 83   MCH 24.1* 23.9* 24.6*   MCHC 29.3* 28.8* 29.7*     BNP:  No results for input(s): "BNP" in the last 168 hours.    Invalid input(s): "BNPTRIAGELBLO"  CMP:  Recent Labs   Lab 05/23/25  1402 05/24/25  0552 05/25/25  0349 05/25/25 2013 05/26/25  0430   *   < > 109 162* 106   CALCIUM 8.0*   < > 7.7* 7.6* 7.7*   ALBUMIN 2.4*  --   --   --   --    PROT 7.1  --   --   --   --       < > 139 138 139   K 3.4*   < > 3.8 " "4.0 4.1   CO2 25   < > 24 23 21*      < > 105 106 107   BUN 7*   < > 15 19 23   CREATININE 3.4*   < > 5.5* 6.1* 6.5*   ALKPHOS 94  --   --   --   --    ALT 32  --   --   --   --    AST 31  --   --   --   --    BILITOT 0.5  --   --   --   --     < > = values in this interval not displayed.      Coagulation:   Recent Labs   Lab 05/23/25  1519 05/24/25  0552 05/25/25  0349 05/26/25  0430   INR 2.5* 3.0* 2.9* 2.5*   APTT 37.5*  --   --   --      LDH:  No results for input(s): "LDH" in the last 72 hours.  Microbiology:  Microbiology Results (last 7 days)       Procedure Component Value Units Date/Time    Stool culture [[id number]]     Order Status: Sent Specimen: Stool             I have reviewed all pertinent labs within the past 24 hours.    Estimated Creatinine Clearance: 13.1 mL/min (A) (based on SCr of 6.5 mg/dL (H)).    Diagnostic Results:  I have reviewed and interpreted all pertinent imaging results/findings within the past 24 hours.  Assessment and Plan:     64M PMHx of stage D HFrEF 2/2 ICM s/p HM3 as DT (12/1/2023), ESRD on HD (MWF), and debility here for ongoing diarrhea after treatment of c. difficile infection. Pertinent history below:    5/9-11: Patient was recently hospitalized for anemia and C-difficile infection. At that time patient received a unit of blood, hydrated for low flow alarms, hydralazine given to target MAPs to 80s with Hydral 50mg TID, then put on extended vancomycin course with taper for discharge on 5/11  5 mLs (125 mg total) q6 hours for 9 days,   5 mLs (125 mg total) q12h day for 7 days,   5 mLs (125 mg total) qdaily for 7 days,   5 mLs (125 mg total) every other day for 14 days.    5/12: Then presented for UTI symptoms for which he took Fosfomycin. The Urine cultures grew pseudomonas aeruginosa which ID recommended to forego further treatment if no longer having UTI symptoms.    5/23/25: Patient reports today for ongoing weakness and diarrhea. States that he has been taking the " vancomycin and has been on the 125mg q12h dosage. He had C. diff toxin and antigen collected which both resulted negative. Otherwise his labwork has been stable with wbc 5.0, hgb 8.5, borderline K 3.4, INR 2.0. At the outside hospital he was given IV metronidazole 500mg and oral vancomycin 500mg once. He has been taking loperamide every 4 hours, and had one dose of metamucil.       Anemia  Received 1 unit pRBC in early May for hgb to 6.8. Hgb is 8.5 on admit, dec to 7.5 next day. BP stable. No visible blood in stool.  His INR is 2 and no signs of acute bleeding  CTM  -iron studies pending   -hgb stable/improved to 8.2 /27.6     Diarrhea of presumed infectious origin  Improved    Patient c.difficile tox and ag studies are negative. He was treated on oral vancomycin taper (125mg q6h for 9d, q12h for 7d, qdaily for 7d, qEveryOtherDay for 14d) after his 5/9-11 admission and is most recently on the q12h dosing which he has been compliant with. 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  -Stool studies: culture, Ova & Parasites, Giardia/crypto, Rotavirus, Calprotectin, Fecal Fat, Pancreatic Elastase   -Metamucil BID   -ID consult, appreciate recs. Received oral vanc 500mg and IV metro 500mg on 5/23/25 evening prior to transfer.   -per ID, cont vancomycin taper. Monitor stool frequency. Since symptoms improved - no plan to transition to fidoxymycin    ESRD (end stage renal disease)  Nephrology consult for MWF Dialysis    LVAD (left ventricular assist device) present  Stage D HFrEF 2/2 ICM s/p HM3 as DT (12/1/2023). Not decompensated.  Blood thinner:Pharmacy managing  Hydral 50mg q8h. Previously had low flow alarms with high maps and anemia    Procedure: Device Interrogation Including analysis of device parameters  Current Settings: Ventricular Assist Device  Review of device function is stable/unstable stable      5/26/2025     8:22 AM 5/26/2025     4:55 AM 5/25/2025    11:10 PM 5/25/2025     8:22 PM 5/25/2025     12:00 PM 5/25/2025     7:31 AM 5/25/2025     4:58 AM   TXP LVAD INTERROGATIONS   Type HeartMate3 HeartMate3 HeartMate3 HeartMate3 HeartMate3 HeartMate3 HeartMate3   Flow 3.4 3.5 3.6 3.7 4.2 3.2 3.7   Speed 5000 5000 5000 5000 5000 5000 5000   PI 6.3 6 6.1 5.5 3.9 7.4 5.3   Power ([place]) 3.4 3.3 3.3 3.3 3.4 3.3 3.3   LSL 4600 4600 4600 4600      Pulsatility Pulse  Intermittent pulse Pulse Pulse Pulse              Bassem Morelos MD  Heart Transplant  [facility] - Cardiac Intensive Care

## 2025-05-26 NOTE — PLAN OF CARE
Problem: Adult Inpatient Plan of Care  Goal: Plan of Care Review  Outcome: Progressing  Flowsheets (Taken 5/26/2025 1757)  Plan of Care Reviewed With: patient  Goal: Patient-Specific Goal (Individualized)  Outcome: Progressing  Goal: Absence of Hospital-Acquired Illness or Injury  Outcome: Progressing  Intervention: Identify and Manage Fall Risk  Flowsheets (Taken 5/26/2025 1757)  Safety Promotion/Fall Prevention:   assistive device/personal item within reach   family to remain at bedside   medications reviewed   lighting adjusted   side rails raised x 2   room near unit station  Intervention: Prevent Skin Injury  Flowsheets (Taken 5/26/2025 1757)  Body Position: position changed independently  Skin Protection: incontinence pads utilized  Device Skin Pressure Protection: absorbent pad utilized/changed  Intervention: Prevent and Manage VTE (Venous Thromboembolism) Risk  Flowsheets (Taken 5/26/2025 1757)  VTE Prevention/Management: ambulation promoted  Intervention: Prevent Infection  Flowsheets (Taken 5/26/2025 1757)  Infection Prevention:   cohorting utilized   equipment surfaces disinfected   personal protective equipment utilized   hand hygiene promoted   rest/sleep promoted   single patient room provided  Goal: Optimal Comfort and Wellbeing  Outcome: Progressing  Intervention: Monitor Pain and Promote Comfort  Flowsheets (Taken 5/26/2025 1757)  Pain Management Interventions: care clustered  Intervention: Provide Person-Centered Care  Flowsheets (Taken 5/26/2025 1757)  Trust Relationship/Rapport:   care explained   thoughts/feelings acknowledged  Goal: Readiness for Transition of Care  Outcome: Progressing  Intervention: Mutually Develop Transition Plan  Flowsheets (Taken 5/26/2025 1757)  Transportation Anticipated: family or friend will provide

## 2025-05-26 NOTE — PROGRESS NOTES
Bedside Hd completed. Blood returned and catheter ports flushed with normal saline, port  instilled with heparin as indicated, ports secured and capped.post  B/P 92/) pulse 82, patient awake and alert and voiced no complaints.patient stable

## 2025-05-27 ENCOUNTER — LAB VISIT (OUTPATIENT)
Dept: LAB | Facility: HOSPITAL | Age: 65
End: 2025-05-27
Attending: INTERNAL MEDICINE
Payer: MEDICAID

## 2025-05-27 ENCOUNTER — ANTI-COAG VISIT (OUTPATIENT)
Dept: CARDIOLOGY | Facility: CLINIC | Age: 65
End: 2025-05-27
Payer: MEDICAID

## 2025-05-27 ENCOUNTER — CLINICAL SUPPORT (OUTPATIENT)
Dept: REHABILITATION | Facility: HOSPITAL | Age: 65
End: 2025-05-27
Payer: MEDICAID

## 2025-05-27 DIAGNOSIS — R68.89 DECREASED STRENGTH, ENDURANCE, AND MOBILITY: ICD-10-CM

## 2025-05-27 DIAGNOSIS — Z95.811 LVAD (LEFT VENTRICULAR ASSIST DEVICE) PRESENT: Primary | ICD-10-CM

## 2025-05-27 DIAGNOSIS — R53.1 GENERALIZED WEAKNESS: Primary | ICD-10-CM

## 2025-05-27 DIAGNOSIS — R27.8 DECREASED COORDINATION: ICD-10-CM

## 2025-05-27 DIAGNOSIS — R53.1 DECREASED STRENGTH, ENDURANCE, AND MOBILITY: ICD-10-CM

## 2025-05-27 DIAGNOSIS — Z95.811 PRESENCE OF VENTRICULAR ASSIST DEVICE: ICD-10-CM

## 2025-05-27 DIAGNOSIS — R29.898 DECREASED STRENGTH OF UPPER EXTREMITY: Primary | ICD-10-CM

## 2025-05-27 DIAGNOSIS — Z74.09 DECREASED STRENGTH, ENDURANCE, AND MOBILITY: ICD-10-CM

## 2025-05-27 LAB
INR PPP: 1.9 (ref 0.8–1.2)
PROTHROMBIN TIME: 20.3 SECONDS (ref 9–12.5)

## 2025-05-27 PROCEDURE — 85610 PROTHROMBIN TIME: CPT

## 2025-05-27 PROCEDURE — 97110 THERAPEUTIC EXERCISES: CPT | Mod: PN | Performed by: PHYSICAL THERAPIST

## 2025-05-27 PROCEDURE — 36415 COLL VENOUS BLD VENIPUNCTURE: CPT

## 2025-05-27 PROCEDURE — 97530 THERAPEUTIC ACTIVITIES: CPT | Mod: PN

## 2025-05-27 NOTE — PROGRESS NOTES
Admitted 5/23-5/26. Hospital course: Radha Abbott is a 65 y.o. male s/p HM3 VAD admitted for ongoing diarrhea after treatment for CDIF. He was discharged after diarrhea decreased and repeat CDIF was negative. He was sent home on psyllium and previous vancomycin taper.

## 2025-05-27 NOTE — PROGRESS NOTES
"  Outpatient Rehab    Occupational Therapy Progress Note : Updated Plan of Care  Re-Evaluation    Today's Date: 5/27/2025  Name: Radha Abbott  Clinic Number: [id number]    Therapy Diagnosis:   Encounter Diagnoses   Name Primary?    Decreased strength of upper extremity Yes    Decreased coordination     Decreased strength, endurance, and mobility      Physician: Brayan Ocampo MD    Physician Orders: OT eval and tx  Medical Diagnosis: LVAD (left ventricular assist device) present [Z95.811], Falls [R29.6]     Evaluation Date: 4/8/2025  Insurance Authorization Period: 4/7/2025 - 12/31/2025  Previous Plan of Care Certification Period: 4/8/2025 to 7/1/2025  Updated Plan of Care Certification Period: 5/27/2025 - 8/22/2025  Progress Note Due: 6/27/2025     Visit # / Visits authorized: 7/20  FOTO: 1/3    Precautions: LVAD - no lifting >50#, chest tube from LVAD, DM, dialysis port (dialysis 3x/week M,W,F)    Time In: 1020  Time Out: 1100  Total Time: 40 minutes  Total Billable Time: 40 minutes    Subjective     Pt reports: "I've been hospitalized twice since I last saw you. Every time I have to go into the hospital, I come out weaker."    he was compliant with home exercise program.  Response to previous treatment: good  Functional change: improved tolerance for upper extremity strengthening    Pain: 0/10  Location: BUE's    Objective     Observation: bilateral hand/upper extremity tremors with exertion    Postural Screen: slouched posture, rounded shoulders, keeping head/neck flexed down, posterior pelvic tilt - sliding out of chair    Endurance Deficit: severe    BUE AROM: AROM is WNL    Manual Muscles Test:   Right Left   Strength 5/27/2025 5/27/2025   Shoulder flexion 4-/5 4-/5   Shoulder abduction 4/5 4/5   Shoulder internal rotation  (Shoulder adducted at side) 4+/5 4+/5   Shoulder external rotation  (Shoulder adducted at side) 3+/5 3+/5   Elbow flexion 4+/5 4+/5   Elbow extension 4/5 4/5   Wrist flexion 3+/5 3+/5 "   Wrist extension 4/5 4/5   Tested in a sitting position                                   and Pinch Strength (in pounds, psi's):   Right Left    5/27/2025 5/27/2025    II 32 35   Lateral 7 6   3pt / Tripod 8 7   2pt / Tip 4 4       Fine Motor Coordination: 9-Hole Peg Test  Right Left   5/27/2025 5/27/2025   49 sec 61 sec   Pt utilizing a lateral pinch with left hand      Intake Outcome Measure for Upper Arm Survey    Therapist reviewed FOTO scores for Radha Abbott on 5/6/2025.   FOTO documents entered into EPIC - see Media section.    Intake Score: 67%     Predicted Functional Score: 69%     Treatment     Radha received the treatments listed below:      therapeutic exercises to develop strength and ROM of BUE's for 25 minutes including:  - reassessment  - UBE - attempted but unable  - composite gripping with PHE (black spring, 3rd position) 3x10  - chest press with 3# dowel 2x10  - overhead press with 3# dowel 2x10    therapeutic activities to improve functional performance of BUE's for 15 minutes including:  - chair push-ups - attempted but unable  - min A to push through arms to reposition self in chair  - used lower extremities to push self backwards with max A while seated on rollator     neuromuscular re-education activities to improve Coordination and Proprioception of BUE's for 0 minutes including:  - NT    Home Exercises and Education Provided     Education provided:   - progress toward goals    Education Recipient:  [x] Patient [] Other recipient present    Patient Learning Style:  [x] Demonstration [x] Listening [] Pictures/video [] Reading [] Tactile    Patient Response to Education:  [x] Demonstrates understanding [x] Verbalizes understanding    [] Requires assistance [] Requires continuing/additional education    Written Home Exercises Provided: Patient instructed to continue previously-issued HEP.  See EMR under Patient Instructions for exercises provided during therapy sessions.     "  Assessment     Radha returned today for his 7th visit on this date. He has been hospitalized twice since he last attended therapy on 5/6/2025. He presented with significantly decreased strength and endurance. Re-evaluation / Updated Plan of Care completed.     Radha is progressing towards his goals. Goals addressed and updated. Pt prognosis is Fair.     Radha will continue to benefit from skilled outpatient occupational therapy services to address the deficits listed in the problem list on initial evaluation, to provide pt/family education, and to maximize pt's level of independence in the home and community environment.     Pt's spiritual, cultural and educational needs considered and pt agreeable to plan of care and goals.    Anticipated barriers to occupational therapy: LVAD - no lifting >50#, chest tube from LVAD, DM, dialysis port (dialysis 3x/week M,W,F), post COVID, chronic condition    Goals:   Active       Endurance       Pt will improve endurance deficit from severe to mild.       Start:  05/27/25    Expected End:  08/22/25               Fine hand motor use       Patient will complete 9-Hole Peg Test with his right dominant hand in <45 seconds. (Progressing)       Start:  04/08/25    Expected End:  06/27/25               Functional outcome       Pt will report an increase in FOTO intake score of > 55%, which would indicate an improvement in quality of life.  (Met)       Start:  04/08/25    Expected End:  07/01/25    Resolved:  05/06/25         Patient stated goal: "get my strength back up"  (Progressing)       Start:  04/08/25    Expected End:  08/22/25            Patient will demonstrate independence in home program for support of progression (Met)       Start:  04/08/25    Expected End:  05/06/25    Resolved:  05/06/25            Functional outcome       Pt will report an increase in FOTO intake score of > 67%, which would indicate an improvement in quality of life.        Start:  05/27/25    " Expected End:  08/22/25               Strength       Patient will achieve bilateral shoulder flexion strength of 4+/5 (Progressing)       Start:  04/08/25    Expected End:  08/22/25            Patient will achieve bilateral shoulder external rotation strength of 4/5 in neutral (Met)       Start:  04/08/25    Expected End:  07/01/25    Resolved:  05/06/25         Patient will achieve bilateral elbow flexion strength of 5/5 (Progressing)       Start:  04/08/25    Expected End:  08/22/25            Patient will achieve bilateral wrist flexion strength of 4/5 (Met)       Start:  04/08/25    Expected End:  07/01/25    Resolved:  05/06/25         Patient will achieve bilateral wrist extension strength of 4/5 (Met)       Start:  04/08/25    Expected End:  07/01/25    Resolved:  05/06/25         Patient will achieve bilateral  strength of 50# in the 2nd position (Progressing)       Start:  04/08/25    Expected End:  08/22/25            Patient will achieve bilateral lateral pinch strength of 9# (Progressing)       Start:  04/08/25    Expected End:  08/22/25                Plan     Continue occupational therapy services 1-2x/week for 12 weeks.    Updates/Grading for next session: progress occupational therapy as tolerated    LYUDMILA DOCKERY, CHT

## 2025-05-27 NOTE — PROGRESS NOTES
Outpatient Rehab    Physical Therapy Progress Note : Updated Plan of Care    Patient Name: Radha Abbott  MRN: [id number]  YOB: 1960  Encounter Date: 5/27/2025    Therapy Diagnosis:   Encounter Diagnosis   Name Primary?    Generalized weakness Yes     Physician: Walter Mcintyre MD    Physician Orders: Eval and Treat  Medical Diagnosis: LVAD (left ventricular assist device) present  Acute on chronic combined systolic and diastolic CHF, NYHA class 4  LVAD (left ventricular assist device) present    Visit # / Visits Authorized:  9 / 20  Insurance Authorization Period: 3/24/2025 to 12/31/2025  Date of Evaluation: 3/25/2025  Plan of Care Certification: 5/27/2025 to 07/11/2025      PT/PTA:     Number of PTA visits since last PT visit:   Time In: 1115   Time Out: 1155  Total Time (in minutes): 40   Total Billable Time (in minutes): 40    FOTO:  Intake Score:  %  Survey Score 2:  %  Survey Score 3:  %    Precautions:       Subjective   Pt reports that he just got out of the hospital yesterday.  He has been feeling weak since being in the hospital twice since his last therapy visit.  He cannot stand or walk for as long as he wants and gets tired quickly..  Family / care giver present for this visit:   Pain reported as 3/10. lower back    Objective          Gait Observation:  walking with slow gina with RW     Sensation: Intact to light touch B LE's, all dermatomes     30 second sit to stand: 3 times     Walking distance before needing to sit down:  117 feet     Strength:                                    L/E MMT Right Left Pain/Dysfunction with Movement   Hip Flexion 4/5 4/5     Hip Extension 3+/5 3+/5     Hip Abduction 3+/5 3+/5     Hip Adduction 4/5 4/5     Hip IR 4+/5 4+/5     Hip ER 4+/5 4+/5     Knee Flexion 4/5 4/5     Knee Extension 4/5 4/5     Ankle DF 4/5 4/5     Ankle PF 3+/5 3+/5     Ankle Inversion 4/5 4/5     Ankle Eversion 4/5 4/5                                  Range of Motion:  B  "hips/knees/ankles WNL in all directions  Treatment:          CPT Intervention  Low Back Duration / Intensity  05/27/2025 Duration / Intensity  05/06/2025 Duration / Intensity  04/29/2025   TE Walking around the gym 125 feet       TE Seated DF 20x       TE Seated marching  2 x 10 2 x 10 2 x 10   TE Seated knee squeeze 5" x 10 5" x 10 5" x 10   TA Sit to stand     24" box 10x   TE DKTC   10" x 5 10" x 5   TE LTR w/ball   10x 10x   NR TA isometric   10x 10x   NR Quad sets   5" x 20      NR LAQ 2 x 10 2 x 10 2 x 10   NR SAQ    20x 2 x 10   TE Chin tucks   10x 10x    NR  SLR   2 x 10 2 x 10    NR  Sidelying hip abd     2 x 10    NR  clamshells     2 x 10    NR  Bridging   20x 20x   PLAN                 CPT Codes available for Billing:   (--) minutes of Manual therapy (MT) to improve pain and ROM.  (40) minutes of Therapeutic Exercise (TE) to develop strength, endurance, range of motion, and flexibility.  (--) minutes of Neuromuscular Re-Education (NR)  to improve: Balance, Coordination, Kinesthetic, Sense, Proprioception, and Posture.  (--) minutes of Therapeutic Activities (TA) to improve functional performance.  Unattended Electrical Stimulation (ES) for muscle performance or pain modulation.  Vasopneumatic Device Therapy () for management of swelling/edema. ([id number])  BFR: Blood flow restriction applied during exercise  NP or (-): Not Performed       Assessment & Plan   Assessment  Ledric presents with a condition of Moderate complexity.   Presentation of Symptoms: Stable  Will Comorbidities Impact Care: No       Functional Limitations: Activity tolerance, Completing work/school activities, Completing self-care activities, Decreased ambulation distance/endurance, Disrupted sleep pattern, Gait limitations, Functional mobility, Pain with ADLs/IADLs, Range of motion, Standing tolerance  Impairments: Abnormal gait, Abnormal muscle tone, Abnormal or restricted range of motion, Activity intolerance, Impaired physical " strength, Lack of appropriate home exercise program, Pain with functional activity    Patient Goal for Therapy (PT): Patient would like to be able to walk and stand like he used to without being too tired.  Prognosis: Good  Assessment Details: Pt had a recent hospital stay that has caused him to have increased weakness and fatigue in his body. Pt presents with increased weakness in BLEs. He has limited walking and standing tolerance. He also has slight difficulty with transfers and needs cuing to perform sit to stand correctly each time. He was fatigued by the end of his treatment session    Plan  From a physical therapy perspective, the patient would benefit from: Skilled Rehab Services    Planned therapy interventions include: Therapeutic exercise, Therapeutic activities, Neuromuscular re-education, Manual therapy, ADLs/IADLs, and Other (Comment). FDN  Planned modalities to include: Electrical stimulation - attended, Electrical stimulation - passive/unattended, Ultrasound, and Mechanical traction.        Visit Frequency: 2 times Per Week for 8 Weeks.       This plan was discussed with Patient.   Discussion participants: Agreed Upon Plan of Care             The patient will continue to benefit from skilled outpatient physical therapy in order to address the deficits listed in the problem list on the initial evaluation, provide patient and family education, and maximize the patients level of independence in the home and community environments.     The patient's spiritual, cultural, and educational needs were considered, and the patient is agreeable to the plan of care and goals.           Goals:   Active       Ambulation/movement       Patient will walk 250 feet in 2 minutes (Progressing)       Start:  03/25/25    Expected End:  07/11/25               Functional outcome       Patient will show a significant change in FOTO patient-reported outcome tool to demonstrate subjective improvement (Progressing)       Start:   03/25/25    Expected End:  07/11/25            Patient stated goal: patient would like to not have pain in his lower back and have better balance.  (Progressing)       Start:  03/25/25    Expected End:  07/11/25            Patient will demonstrate independence in home program for support of progression (Met)       Start:  03/25/25    Expected End:  04/18/25    Resolved:  04/19/25            Pain       Patient will report pain of 5/10 demonstrating a reduction of overall pain (Progressing)       Start:  03/25/25    Expected End:  07/11/25               Range of Motion       Patient will achieve spinal flexion to 75% (Progressing)       Start:  03/25/25    Expected End:  07/11/25            Patient will achieve spinal extension to 75% (Progressing)       Start:  03/25/25    Expected End:  07/11/25                Yolanda Rothman, PT, DPT

## 2025-05-28 ENCOUNTER — ANTI-COAG VISIT (OUTPATIENT)
Dept: CARDIOLOGY | Facility: CLINIC | Age: 65
End: 2025-05-28
Payer: MEDICAID

## 2025-05-28 ENCOUNTER — TELEPHONE (OUTPATIENT)
Dept: TRANSPLANT | Facility: CLINIC | Age: 65
End: 2025-05-28
Payer: MEDICAID

## 2025-05-28 ENCOUNTER — PATIENT OUTREACH (OUTPATIENT)
Dept: ADMINISTRATIVE | Facility: CLINIC | Age: 65
End: 2025-05-28
Payer: MEDICAID

## 2025-05-28 DIAGNOSIS — Z95.811 LVAD (LEFT VENTRICULAR ASSIST DEVICE) PRESENT: Primary | ICD-10-CM

## 2025-05-28 PROCEDURE — 93793 ANTICOAG MGMT PT WARFARIN: CPT | Mod: ,,,

## 2025-05-28 NOTE — TELEPHONE ENCOUNTER
Arlyn called to ask about hydralazine dosing. She said she is checking his BP in the morning and wanted to know when to give the BP medication.  I asked what the directions on the bottle are. Arlyn said to give every 8 hours. I explained to give every 8 house, not 3x/day. Explained NIBP cuff vs doppler and about how the medication works. Arlyn reports Ledric is feeling much better than last week. I re-enforced correct directions of medication administration.

## 2025-05-28 NOTE — PROGRESS NOTES
[facility]    2025 9:16 AM    Assessment/Plan:    Patient presents today with subtherapeutic  INR.    Assessment of patient findings and chart review: no significant findings     Recommendation for patient's warfarin regimen: Boost dose today to 2.5mg then resume current maintenance dose    Recommend repeat INR Friday  _________________________________________________________________    [facility] (65 y.o.) is followed by the [facility].    Anticoagulation Summary  As of 2025      INR goal:  2.0-3.0   TTR:  68.0% (1.1 y)   INR used for dosin.9 (2025)   Warfarin maintenance plan:  2.5 mg (2.5 mg x 1) every Thu; 1.25 mg (2.5 mg x 0.5) all other days   Weekly warfarin total:  10 mg   Plan last modified:  Jesenia Jensen, PharmD (2025)   Next INR check:  2025   Target end date:  --    Indications    LVAD (left ventricular assist device) present [Z95.811]                 Anticoagulation Episode Summary       INR check location:  --    Preferred lab:  --    Send INR reminders to:  [facility]    Comments:  [place]          Anticoagulation Care Providers       Provider Role Specialty Phone number    Sajan Hurley MD [facility] [phone]

## 2025-05-28 NOTE — PROGRESS NOTES
Arlyn reports correct Warfarin dose, started Metamucil 1 week ago, no other changes reported.

## 2025-05-28 NOTE — PATIENT INSTRUCTIONS
More than 6 runny-watery stools in 24 hours/fever 101.3F or higher-chills that don't go away after a day/very bad belly pain/stools with small amount of blood in them (1 teaspoon-5 mL)/early signs of fluid loss: dark colored urine/dry mouth/muscle cramps/lack of energy/feel light-headed when getting up/new-worsening symptoms.

## 2025-05-28 NOTE — PROGRESS NOTES
C3 nurse spoke with Emmanuelleaimee Abbott, patient's spouse, Arlyn, for a TCC post hospital discharge follow up call. The patient has a scheduled [facility] appointment with [facility], Emeli Perkins NP, on 6/2/2025 @ 0800.

## 2025-05-29 ENCOUNTER — CLINICAL SUPPORT (OUTPATIENT)
Dept: REHABILITATION | Facility: HOSPITAL | Age: 65
End: 2025-05-29
Payer: MEDICAID

## 2025-05-29 DIAGNOSIS — R27.8 DECREASED COORDINATION: ICD-10-CM

## 2025-05-29 DIAGNOSIS — Z74.09 DECREASED STRENGTH, ENDURANCE, AND MOBILITY: ICD-10-CM

## 2025-05-29 DIAGNOSIS — R68.89 DECREASED STRENGTH, ENDURANCE, AND MOBILITY: ICD-10-CM

## 2025-05-29 DIAGNOSIS — R53.1 DECREASED STRENGTH, ENDURANCE, AND MOBILITY: ICD-10-CM

## 2025-05-29 DIAGNOSIS — R29.898 DECREASED STRENGTH OF UPPER EXTREMITY: Primary | ICD-10-CM

## 2025-05-29 PROCEDURE — 97530 THERAPEUTIC ACTIVITIES: CPT | Mod: PN

## 2025-05-29 NOTE — PROGRESS NOTES
"  Outpatient Rehab    Occupational Therapy Visit    Today's Date: 5/29/2025  Name: Radha Abbott  Clinic Number: [id number]    Therapy Diagnosis:   Encounter Diagnoses   Name Primary?    Decreased strength of upper extremity Yes    Decreased coordination     Decreased strength, endurance, and mobility      Physician: Lilli Mckenzie MD    Physician Orders: OT eval and tx  Medical Diagnosis: LVAD (left ventricular assist device) present [Z95.811], Falls [R29.6]     Evaluation Date: 4/8/2025  Insurance Authorization Period: 4/7/2025 - 12/31/2025  Plan of Care Certification Period: 5/27/2025 - 8/22/2025  Progress Note Due: 6/27/2025     Visit # / Visits authorized: 7/20  FOTO: 1/3    Precautions: LVAD - no lifting >50#, chest tube from LVAD, DM, dialysis port (dialysis 3x/week M,W,F)    Time In: 1330  Time Out: 1420  Total Time: 50 minutes  Total Billable Time: 40 minutes    Subjective     Pt reports: "I'm feeling better today. My sitter made me do my homework."    he was compliant with home exercise program.  Response to previous treatment: good  Functional change: improved tolerance for upper extremity strengthening    Pain: 0/10  Location: BUE's    Objective   Objective measures updated at progress note unless specified.    Treatment     Radha received the treatments listed below:      therapeutic exercises to develop strength and ROM of BUE's for 25 minutes including:  - UBE - pt declined  - recumbent bike x 2 min  - composite gripping with PHE (black spring, 3rd position) 5x10  - bilateral elbow flex/ext AROM, forearm neutral and supinated, with 4# DB's x 15 reps each  - bilateral elbow flex/ext AROM, forearm pronated, with 3# DB's x 15 reps  - chest press machine with 10# 3x10  - row machine with 15# 3x10  - triceps machine with 35# 3x10     therapeutic activities to improve functional performance of BUE's for 15 minutes including:  - chair push-ups 3x10  - stand pivot transfers w/c<-->upper extremity machines " and recumbent bike with CGA    neuromuscular re-education activities to improve Coordination and Proprioception of BUE's for 0 minutes including:  - NT    Home Exercises and Education Provided     Education provided:   - progress toward goals    Education Recipient:  [x] Patient [] Other recipient present    Patient Learning Style:  [x] Demonstration [x] Listening [] Pictures/video [] Reading [] Tactile    Patient Response to Education:  [x] Demonstrates understanding [x] Verbalizes understanding    [] Requires assistance [] Requires continuing/additional education    Written Home Exercises Provided: Patient instructed to continue previously-issued HEP.  See EMR under Patient Instructions for exercises provided during therapy sessions.      Assessment     Radha returned today for his 8th visit on this date. He had better tolerance for therapy on this date but continues to require CGA and verbal cueing for safety with stand pivot transfers; otherwise, he leans and falls backwards.    Radha is progressing towards his goals. There are no updates to goals at this time. Pt prognosis is Fair.     Radha will continue to benefit from skilled outpatient occupational therapy services to address the deficits listed in the problem list on initial evaluation, to provide pt/family education, and to maximize pt's level of independence in the home and community environment.     Pt's spiritual, cultural and educational needs considered and pt agreeable to plan of care and goals.    Anticipated barriers to occupational therapy: LVAD - no lifting >50#, chest tube from LVAD, DM, dialysis port (dialysis 3x/week M,W,F), post COVID, chronic condition    Goals:   Active       Endurance       Pt will improve endurance deficit from severe to mild.       Start:  05/27/25    Expected End:  08/22/25               Fine hand motor use       Patient will complete 9-Hole Peg Test with his right dominant hand in <45 seconds. (Progressing)        "Start:  04/08/25    Expected End:  06/27/25               Functional outcome       Pt will report an increase in FOTO intake score of > 55%, which would indicate an improvement in quality of life.  (Met)       Start:  04/08/25    Expected End:  07/01/25    Resolved:  05/06/25         Patient stated goal: "get my strength back up"  (Progressing)       Start:  04/08/25    Expected End:  08/22/25            Patient will demonstrate independence in home program for support of progression (Met)       Start:  04/08/25    Expected End:  05/06/25    Resolved:  05/06/25            Functional outcome       Pt will report an increase in FOTO intake score of > 67%, which would indicate an improvement in quality of life.        Start:  05/27/25    Expected End:  08/22/25               Strength       Patient will achieve bilateral shoulder flexion strength of 4+/5 (Progressing)       Start:  04/08/25    Expected End:  08/22/25            Patient will achieve bilateral shoulder external rotation strength of 4/5 in neutral (Met)       Start:  04/08/25    Expected End:  07/01/25    Resolved:  05/06/25         Patient will achieve bilateral elbow flexion strength of 5/5 (Progressing)       Start:  04/08/25    Expected End:  08/22/25            Patient will achieve bilateral wrist flexion strength of 4/5 (Met)       Start:  04/08/25    Expected End:  07/01/25    Resolved:  05/06/25         Patient will achieve bilateral wrist extension strength of 4/5 (Met)       Start:  04/08/25    Expected End:  07/01/25    Resolved:  05/06/25         Patient will achieve bilateral  strength of 50# in the 2nd position (Progressing)       Start:  04/08/25    Expected End:  08/22/25            Patient will achieve bilateral lateral pinch strength of 9# (Progressing)       Start:  04/08/25    Expected End:  08/22/25                  Plan     Continue occupational therapy services 1-2x/week for 12 weeks.    Updates/Grading for next session: progress " occupational therapy as tolerated    LYUDMILA DOCKERY, CHT

## 2025-05-30 ENCOUNTER — LAB VISIT (OUTPATIENT)
Dept: LAB | Facility: HOSPITAL | Age: 65
End: 2025-05-30
Attending: INTERNAL MEDICINE
Payer: MEDICAID

## 2025-05-30 ENCOUNTER — ANTI-COAG VISIT (OUTPATIENT)
Dept: CARDIOLOGY | Facility: CLINIC | Age: 65
End: 2025-05-30
Payer: MEDICAID

## 2025-05-30 DIAGNOSIS — Z95.811 LVAD (LEFT VENTRICULAR ASSIST DEVICE) PRESENT: Primary | ICD-10-CM

## 2025-05-30 DIAGNOSIS — Z95.811 PRESENCE OF VENTRICULAR ASSIST DEVICE: ICD-10-CM

## 2025-05-30 LAB
INR PPP: 2.3 (ref 0.8–1.2)
PROTHROMBIN TIME: 25.1 SECONDS (ref 9–12.5)

## 2025-05-30 PROCEDURE — 36415 COLL VENOUS BLD VENIPUNCTURE: CPT

## 2025-05-30 PROCEDURE — 85610 PROTHROMBIN TIME: CPT

## 2025-05-30 NOTE — PROGRESS NOTES
[facility]    05/30/2025    Radha Abbott (65 y.o.) is followed by the [facility].      Assessment/Plan:    Radha Abbott presents with a therapeutic INR. Goal INR: 2.0-3.0    Lab Results   Component Value Date    INR 2.3 (H) 05/30/2025    INR 1.9 (H) 05/27/2025    INR 2.5 (H) 05/26/2025    PROTIME 25.1 (H) 05/30/2025    PROTIME 20.3 (H) 05/27/2025    PROTIME 26.0 (H) 05/26/2025       Assessment of patient findings per MA/LPN and chart review:   The following significant findings were found:   none    Recommendation for patient's warfarin regimen:   No change was made to warfarin therapy during this visit and patient has been instructed to continue their current warfarin regimen.    Recommended repeat INR in 5 days      Joycleyn Angel, PharmD, BCPS  Clinical Pharmacist - [facility]  Preferred Contact: Secure Messaging or In Basket Message

## 2025-06-03 ENCOUNTER — CLINICAL SUPPORT (OUTPATIENT)
Dept: REHABILITATION | Facility: HOSPITAL | Age: 65
End: 2025-06-03
Payer: MEDICAID

## 2025-06-03 DIAGNOSIS — Z74.09 DECREASED STRENGTH, ENDURANCE, AND MOBILITY: ICD-10-CM

## 2025-06-03 DIAGNOSIS — R53.1 DECREASED STRENGTH, ENDURANCE, AND MOBILITY: ICD-10-CM

## 2025-06-03 DIAGNOSIS — R29.898 DECREASED STRENGTH OF UPPER EXTREMITY: Primary | ICD-10-CM

## 2025-06-03 DIAGNOSIS — R68.89 DECREASED STRENGTH, ENDURANCE, AND MOBILITY: ICD-10-CM

## 2025-06-03 DIAGNOSIS — R27.8 DECREASED COORDINATION: ICD-10-CM

## 2025-06-03 DIAGNOSIS — R53.1 GENERALIZED WEAKNESS: Primary | ICD-10-CM

## 2025-06-03 PROCEDURE — 97530 THERAPEUTIC ACTIVITIES: CPT | Mod: PN

## 2025-06-03 PROCEDURE — 97110 THERAPEUTIC EXERCISES: CPT | Mod: PN | Performed by: PHYSICAL THERAPIST

## 2025-06-04 ENCOUNTER — ANTI-COAG VISIT (OUTPATIENT)
Dept: CARDIOLOGY | Facility: CLINIC | Age: 65
End: 2025-06-04
Payer: MEDICAID

## 2025-06-04 ENCOUNTER — LAB VISIT (OUTPATIENT)
Dept: LAB | Facility: HOSPITAL | Age: 65
End: 2025-06-04
Attending: INTERNAL MEDICINE
Payer: MEDICAID

## 2025-06-04 DIAGNOSIS — Z95.811 LVAD (LEFT VENTRICULAR ASSIST DEVICE) PRESENT: Primary | ICD-10-CM

## 2025-06-04 DIAGNOSIS — Z95.811 PRESENCE OF VENTRICULAR ASSIST DEVICE: ICD-10-CM

## 2025-06-04 LAB
INR PPP: 2.1 (ref 0.8–1.2)
PROTHROMBIN TIME: 22.9 SECONDS (ref 9–12.5)

## 2025-06-04 PROCEDURE — 85610 PROTHROMBIN TIME: CPT

## 2025-06-04 PROCEDURE — 93793 ANTICOAG MGMT PT WARFARIN: CPT | Mod: ,,,

## 2025-06-04 PROCEDURE — 36415 COLL VENOUS BLD VENIPUNCTURE: CPT

## 2025-06-05 ENCOUNTER — CLINICAL SUPPORT (OUTPATIENT)
Dept: REHABILITATION | Facility: HOSPITAL | Age: 65
End: 2025-06-05
Payer: MEDICAID

## 2025-06-05 DIAGNOSIS — R53.1 DECREASED STRENGTH, ENDURANCE, AND MOBILITY: ICD-10-CM

## 2025-06-05 DIAGNOSIS — Z74.09 DECREASED STRENGTH, ENDURANCE, AND MOBILITY: ICD-10-CM

## 2025-06-05 DIAGNOSIS — R68.89 DECREASED STRENGTH, ENDURANCE, AND MOBILITY: ICD-10-CM

## 2025-06-05 DIAGNOSIS — R29.898 DECREASED STRENGTH OF UPPER EXTREMITY: Primary | ICD-10-CM

## 2025-06-05 DIAGNOSIS — R27.8 DECREASED COORDINATION: ICD-10-CM

## 2025-06-05 PROCEDURE — 97530 THERAPEUTIC ACTIVITIES: CPT | Mod: PN

## 2025-06-09 ENCOUNTER — PATIENT MESSAGE (OUTPATIENT)
Dept: TRANSPLANT | Facility: CLINIC | Age: 65
End: 2025-06-09
Payer: MEDICAID

## 2025-06-10 ENCOUNTER — CLINICAL SUPPORT (OUTPATIENT)
Dept: REHABILITATION | Facility: HOSPITAL | Age: 65
End: 2025-06-10
Payer: MEDICAID

## 2025-06-10 DIAGNOSIS — R29.898 DECREASED STRENGTH OF UPPER EXTREMITY: Primary | ICD-10-CM

## 2025-06-10 DIAGNOSIS — R53.1 GENERALIZED WEAKNESS: Primary | ICD-10-CM

## 2025-06-10 DIAGNOSIS — R53.1 DECREASED STRENGTH, ENDURANCE, AND MOBILITY: ICD-10-CM

## 2025-06-10 DIAGNOSIS — Z74.09 DECREASED STRENGTH, ENDURANCE, AND MOBILITY: ICD-10-CM

## 2025-06-10 DIAGNOSIS — R27.8 DECREASED COORDINATION: ICD-10-CM

## 2025-06-10 DIAGNOSIS — R68.89 DECREASED STRENGTH, ENDURANCE, AND MOBILITY: ICD-10-CM

## 2025-06-10 PROCEDURE — 97110 THERAPEUTIC EXERCISES: CPT | Mod: PN | Performed by: PHYSICAL THERAPIST

## 2025-06-10 PROCEDURE — 97530 THERAPEUTIC ACTIVITIES: CPT | Mod: PN

## 2025-06-10 NOTE — PROGRESS NOTES
"  Outpatient Rehab    Physical Therapy Visit    Patient Name: Radha Abbott  MRN: [id number]  YOB: 1960  Encounter Date: 6/10/2025    Therapy Diagnosis:   Encounter Diagnosis   Name Primary?    Generalized weakness Yes     Physician: Lilli Mckenzie MD    Physician Orders: Eval and Treat  Medical Diagnosis: LVAD (left ventricular assist device) present  Acute on chronic combined systolic and diastolic CHF, NYHA class 4  Surgical Diagnosis: Not applicable for this Episode   Surgical Date: Not applicable for this Episode    Visit # / Visits Authorized:  11 / 20  Insurance Authorization Period: 3/24/2025 to 12/31/2025  Date of Evaluation: 3/25/2025  Plan of Care Certification: 5/27/2025 to 7/11/2025      PT/PTA:     Number of PTA visits since last PT visit:   Time In: 1020   Time Out: 1110  Total Time (in minutes): 50   Total Billable Time (in minutes): 50    FOTO:  Intake Score:  %  Survey Score 2:  %  Survey Score 3:  %    Precautions:       Subjective   Pt reports that he is feeling better..  Family / care giver present for this visit:     BLE    Objective            Treatment:          CPT Intervention  Low Back Duration / Intensity  06/10/2025 Duration / Intensity  06/03/2025 Duration / Intensity  05/27/2025   TE  Nustep for ROM and stretching 6'        TE Walking around the gym   150 feet 125 feet   TE Seated DF 3x10 3 x 10 20x   TE Seated marching  3x10 3 x 10 2 x 10   TE Seated knee squeeze 3x10 3 x 10 5" x 10   TA Sit to stand 24" 2 x 5 24" 2 x 5     NR Heel Raises 2x10       NR Standing Abd 2x10       TE DKTC         TE LTR w/ball         NR TA isometric         NR Quad sets 20x 20x      NR LAQ 20x 2 x 10 2 x 10   NR SAQ          TE Chin tucks          NR  SLR   2 x 10       NR  Sidelying hip abd          NR  clamshells          NR  Bridging   2 x 10     PLAN                 CPT Codes available for Billing:   (--) minutes of Manual therapy (MT) to improve pain and ROM.  (50) minutes of " Therapeutic Exercise (TE) to develop strength, endurance, range of motion, and flexibility.  (--) minutes of Neuromuscular Re-Education (NR)  to improve: Balance, Coordination, Kinesthetic, Sense, Proprioception, and Posture.  (--) minutes of Therapeutic Activities (TA) to improve functional performance.  Unattended Electrical Stimulation (ES) for muscle performance or pain modulation.  Vasopneumatic Device Therapy () for management of swelling/edema. (38800)  BFR: Blood flow restriction applied during exercise  NP or (-): Not Performed       Assessment & Plan   Assessment: Pt presents with improved endurance levels and did not report any pain today.  He is able to tolerate sitting and standing for longer periods of time.  Evaluation/Treatment Tolerance: Patient tolerated treatment well    The patient will continue to benefit from skilled outpatient physical therapy in order to address the deficits listed in the problem list on the initial evaluation, provide patient and family education, and maximize the patients level of independence in the home and community environments.     The patient's spiritual, cultural, and educational needs were considered, and the patient is agreeable to the plan of care and goals.           Plan:      Goals:   Active       Ambulation/movement       Patient will walk 250 feet in 2 minutes (Progressing)       Start:  03/25/25    Expected End:  07/11/25               Functional outcome       Patient will show a significant change in FOTO patient-reported outcome tool to demonstrate subjective improvement (Progressing)       Start:  03/25/25    Expected End:  07/11/25            Patient stated goal: patient would like to not have pain in his lower back and have better balance.  (Progressing)       Start:  03/25/25    Expected End:  07/11/25            Patient will demonstrate independence in home program for support of progression (Met)       Start:  03/25/25    Expected End:  04/18/25     Resolved:  04/19/25            Pain       Patient will report pain of 5/10 demonstrating a reduction of overall pain (Progressing)       Start:  03/25/25    Expected End:  07/11/25               Range of Motion       Patient will achieve spinal flexion to 75% (Progressing)       Start:  03/25/25    Expected End:  07/11/25            Patient will achieve spinal extension to 75% (Progressing)       Start:  03/25/25    Expected End:  07/11/25                Yolanda Rothman PT, RAMBOT
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
## 2025-06-11 ENCOUNTER — OFFICE VISIT (OUTPATIENT)
Dept: TRANSPLANT | Facility: CLINIC | Age: 65
End: 2025-06-11
Payer: MEDICAID

## 2025-06-11 ENCOUNTER — ANTI-COAG VISIT (OUTPATIENT)
Dept: CARDIOLOGY | Facility: CLINIC | Age: 65
End: 2025-06-11
Payer: MEDICAID

## 2025-06-11 ENCOUNTER — DOCUMENTATION ONLY (OUTPATIENT)
Dept: CARDIOTHORACIC SURGERY | Facility: CLINIC | Age: 65
End: 2025-06-11
Payer: MEDICAID

## 2025-06-11 ENCOUNTER — CLINICAL SUPPORT (OUTPATIENT)
Dept: TRANSPLANT | Facility: CLINIC | Age: 65
End: 2025-06-11
Payer: MEDICAID

## 2025-06-11 ENCOUNTER — HOSPITAL ENCOUNTER (OUTPATIENT)
Dept: PULMONOLOGY | Facility: CLINIC | Age: 65
Discharge: HOME | End: 2025-06-11
Payer: MEDICAID

## 2025-06-11 VITALS
SYSTOLIC BLOOD PRESSURE: 76 MMHG | BODY MASS INDEX: 22.53 KG/M2 | BODY MASS INDEX: 24.78 KG/M2 | HEART RATE: 86 BPM | WEIGHT: 187 LBS | TEMPERATURE: 98 F | HEIGHT: 73 IN | WEIGHT: 170 LBS | HEIGHT: 73 IN

## 2025-06-11 DIAGNOSIS — N18.6 ESRD (END STAGE RENAL DISEASE): ICD-10-CM

## 2025-06-11 DIAGNOSIS — Z95.811 LVAD (LEFT VENTRICULAR ASSIST DEVICE) PRESENT: Primary | ICD-10-CM

## 2025-06-11 DIAGNOSIS — Z95.811 HEART REPLACED BY HEART ASSIST DEVICE: Primary | ICD-10-CM

## 2025-06-11 DIAGNOSIS — Z95.811 PRESENCE OF VENTRICULAR ASSIST DEVICE: Primary | ICD-10-CM

## 2025-06-11 DIAGNOSIS — I48.0 PAF (PAROXYSMAL ATRIAL FIBRILLATION): ICD-10-CM

## 2025-06-11 DIAGNOSIS — I50.42 CHRONIC COMBINED SYSTOLIC AND DIASTOLIC HEART FAILURE: ICD-10-CM

## 2025-06-11 DIAGNOSIS — Z95.811 LVAD (LEFT VENTRICULAR ASSIST DEVICE) PRESENT: ICD-10-CM

## 2025-06-11 PROCEDURE — 94618 PULMONARY STRESS TESTING: CPT | Mod: PBBFAC | Performed by: INTERNAL MEDICINE

## 2025-06-11 PROCEDURE — 3008F BODY MASS INDEX DOCD: CPT | Mod: CPTII,,, | Performed by: INTERNAL MEDICINE

## 2025-06-11 PROCEDURE — 99999PBSHW PR PBB SHADOW TECHNICAL ONLY FILED TO HB: Mod: PBBFAC,,,

## 2025-06-11 PROCEDURE — 1111F DSCHRG MED/CURRENT MED MERGE: CPT | Mod: CPTII,,, | Performed by: INTERNAL MEDICINE

## 2025-06-11 PROCEDURE — 99999 PR PBB SHADOW E&M-EST. PATIENT-LVL III: CPT | Mod: PBBFAC,,, | Performed by: INTERNAL MEDICINE

## 2025-06-11 PROCEDURE — 93750 INTERROGATION VAD IN PERSON: CPT | Mod: S$PBB,,, | Performed by: INTERNAL MEDICINE

## 2025-06-11 PROCEDURE — 99215 OFFICE O/P EST HI 40 MIN: CPT | Mod: S$PBB,,, | Performed by: INTERNAL MEDICINE

## 2025-06-11 PROCEDURE — 3044F HG A1C LEVEL LT 7.0%: CPT | Mod: CPTII,,, | Performed by: INTERNAL MEDICINE

## 2025-06-11 PROCEDURE — 99213 OFFICE O/P EST LOW 20 MIN: CPT | Mod: PBBFAC | Performed by: INTERNAL MEDICINE

## 2025-06-11 PROCEDURE — 3066F NEPHROPATHY DOC TX: CPT | Mod: CPTII,,, | Performed by: INTERNAL MEDICINE

## 2025-06-11 NOTE — PROGRESS NOTES
Date of Implant with Heartmate 3 LVAD: 12/1/23    PATIENT ARRIVED IN CLINIC:  Ambulatory with walker  Accompanied by: Wife  Complaints/reason for visit today: routine    Vitals  Temperature, oral:   Temp Readings from Last 1 Encounters:   06/11/25 98.2 °F (36.8 °C) (Oral)     Blood Pressure:   BP Readings from Last 3 Encounters:   06/11/25 (!) 76/0   05/26/25 (!) 92/0   05/12/25 110/72        VAD Interrogation:      6/11/2025    11:32 AM 5/26/2025    12:00 PM 5/26/2025     8:22 AM   TXP PINA INTERROGATIONS   Type HeartMate3     Flow 3.5     Speed 5000     PI 7.1     Power ([place]rington) 3.5     LSL 4600     Pulsatility Intermittent pulse Pulse Pulse     HCT:   Lab Results   Component Value Date    HCT 31.8 (L) 06/11/2025    HCT 36.9 (L) 03/20/2025    HCT 39 07/23/2024       VAD Assessment  Problems / Issues /Equipment Needs/ Alarms with VAD if any: None noted  VAD Sounds: HM3 Smooth  VAD Binder With Patient: No  Reviewed VAD Numbers In Binder: No  Emergency Equipment With Patient: Yes   Equipment Needs: Yes Refer to  note if applicable.   It is medically necessary to ensure patient has properly functioning equipment and wearables to prevent infection, injury or death to patient.     DLES Assessment and Dressing Care:  Appearance of DLES: 1  Antibiotics: NO  Velour: No  Manual & Visual Inspection Of Driveline: No kinks or tears noted  Stabilization Device In Use: yes, goodman securement device    Heartmate 3 Module Cable:  No yellow exposed and Attempted to unscrew modular cable to ensure it will be able to come lose in the event we ever need to change the modular cable while patient held the driveline in place so it would not move. Modular cable connection able to be unscrewed and re-tightened. Instructed pt to perform this weekly.  Frequency of Dressing Changes: weekly & silverlon kit   Pt In Need Of Management Kits?:yes -   2 Box of silverlon kit  It is medically necessary to have VAD management kits in  order to prevent infection or to assist in the healing of an infected DLES.    Patient MyChart Questionnaire:        No data to display                 Assessment/ Quality of Life Survery:   Complaints Of Nausea / Vomiting: None noted    Appearance and Frequency Of Stools: diarrhea and somewhat formed maybe once a week diarrhea & Q2x a day  Color Of Urine: clear/yellow  Pain: NO  Coping/Depression/Anxiety: coping okay  Sleep Habits: 6 hrs /night  Sleep Aids: None noted  Showering: Yes, reminded to change dressing immediately after drying off  Self- Care I have some problems washing or dressing myself  Mobility I have some problems in walking about  Usual Activities I have some problems with performing my usual activities  Activity/Exercise: therapy   Driving: No.  Additional Comments: N/A    Labs:    Chemistry        Component Value Date/Time     06/11/2025 1015     03/20/2025 1022    K 3.5 06/11/2025 1015    K 4.4 03/20/2025 1022     06/11/2025 1015    CL 99 03/20/2025 1022    CO2 22 (L) 06/11/2025 1015    CO2 26 03/20/2025 1022    BUN 16 06/11/2025 1015    CREATININE 6.3 (H) 06/11/2025 1015     (H) 06/11/2025 1015     (H) 03/20/2025 1022        Component Value Date/Time    CALCIUM 8.0 (L) 06/11/2025 1015    CALCIUM 8.6 (L) 03/20/2025 1022    ALKPHOS 110 06/11/2025 1015    ALKPHOS 144 03/20/2025 1022    AST 51 (H) 06/11/2025 1015    AST 34 03/20/2025 1022    ALT 60 (H) 06/11/2025 1015    ALT 53 (H) 03/20/2025 1022    BILITOT 0.4 06/11/2025 1015    BILITOT 0.5 03/20/2025 1022    ESTGFRAFRICA >60.0 02/07/2018 0935    EGFRNONAA >60.0 02/07/2018 0935            Magnesium    Date Value Ref Range Status   06/11/2025 1.8 1.6 - 2.6 mg/dL Final       Lab Results   Component Value Date    WBC 5.51 06/11/2025    HGB 9.1 (L) 06/11/2025    HCT 31.8 (L) 06/11/2025    MCV 90 06/11/2025     06/11/2025       Lab Results   Component Value Date    INR 2.5 (H) 06/11/2025    INR 2.1 (H) 06/04/2025     INR 2.3 (H) 05/30/2025    PROTIME 25.9 (H) 06/11/2025    PROTIME 22.9 (H) 06/04/2025    PROTIME 25.1 (H) 05/30/2025       BNP   Date Value Ref Range Status   06/11/2025 1,432 (H) 0 - 99 pg/mL Final     Comment:     Values of less than 100 pg/ml are consistent with non-CHF populations.    05/12/2025 570 (H) 0 - 99 pg/mL Final     Comment:     Values of less than 100 pg/ml are consistent with non-CHF populations.    04/04/2025 561 (H) 0 - 99 pg/mL Final     Comment:     Values of less than 100 pg/ml are consistent with non-CHF populations.        Lactate Dehydrogenase   Date Value Ref Range Status   06/11/2025 394 (H) 110 - 260 U/L Final   05/11/2025 290 (H) 110 - 260 U/L Final   05/10/2025 308 (H) 110 - 260 U/L Final     LD   Date Value Ref Range Status   03/20/2025 338 (H) 110 - 260 U/L Final     Comment:     Results are increased in hemolyzed samples.   03/12/2025 373 (H) 110 - 260 U/L Final     Comment:     Results are increased in hemolyzed samples.   03/11/2025 570 (H) 110 - 260 U/L Final     Comment:     Results are increased in hemolyzed samples.  *Result may be interfered by visible hemolysis         Labs reviewed with patient: YES     Medication Reconciliation: per MA.  New Medication Detail Provided: yes  Coumadin Managed by: [facility]    Education: Reviewed driveline care, emergency procedures, how to change the controller, alarms with patient, as well as discussed how to page the VAD coordinator in case of an emergency.   Educated patient/family that chest compressions are allowed in the event they are needed.    Reminded patient/caregiver not to touch their face and to cover their mouth when they cough or sneeze.   Vaccines: Pt informed that we are encouraging all VAD patients to receive  vaccines and boosters. Informed pt that they can take tylenol but should avoid other NSAIDs.       Plans/Needs:  Patient seen in clinic for routine follow up with Dr. Ocampo. Patient is doing well,  dialysis MWF. Patient asked about vanc, message sent to ID. 6MW test completed. Palliative consult placed. Patient to RTC in 3 months with lipids and TSH/T4.    Hurricane Season: Yes, discussed with patient: With hurricane season approaching, we want to make sure you are fully prepared for any emergency.  Should the National Weather Service or your local authorities recommend a voluntary or mandatory evacuation of your area, The VAD team requires you to evacuate to a safe place.  Remember, when it is a mandatory evacuation, traffic will become an issue for your limited battery power.  Therefore, we strongly urge you to evacuate early.      The VAD team advises you to have the following in place before hurricane season:  Have an evacuation plan in place including places to evacuate, names and phone numbers.  This information is required to be given to the VAD coordinator.  Have your VAD emergency contact numbers with you.  Make sure your prescriptions will not run out by the end of September.  Make sure you have enough medications, including pills, inhalers, patches,     etc. to take, should you be gone for more than 2 weeks.  Make sure ALL of your batteries are fully charged.  Bring enough dressing change supplies to last for at least 2 weeks.  Bring your VAD binder with you.  Make sure your binder is updated and complete with alarms reference card, patient hand book, emergency contact numbers, daily log sheets, etc.    If you do not have family or friends as an evacuation destination, we recommend evacuating to a safe area.   Do NOT evacuate to [facility].  The VAD team wants to stress the importance of planning for your evacuation in the event of a hurricane.  If you have any LVAD questions or issues, please contact the LVAD coordinator.

## 2025-06-11 NOTE — PROGRESS NOTES
Pt presented for 18 month follow-up and verbalized consent and willingness to continue to participate with the INTERMACS registry.      Patient completed the EQ-5D quality of life questionnaire, KCCQ, and the Trail Making neurocognitive test in 236 seconds.

## 2025-06-11 NOTE — PROGRESS NOTES
"Subjective:   Patient ID:  Radha Abbott is a 65 y.o. male who presents for LVAD followup visit.    Implant Date:12/1/23     Heartmate 3 RPM 5000     INR goal: 2-3   Bridge with Heparin   Antiplatelets: None d/t low H/H on implant         6/11/2025    11:32 AM 5/26/2025    12:00 PM 5/26/2025     8:22 AM 5/25/2025    11:10 PM 5/25/2025     8:22 PM 5/25/2025    12:00 PM 5/25/2025     7:31 AM   TXP PINA INTERROGATIONS   Type HeartMate3         Flow 3.5         Speed 5000         PI 7.1         Power ([place]rington) 3.5         LSL 4600         Pulsatility Intermittent pulse Pulse Pulse Intermittent pulse Pulse Pulse Pulse       HPI  Mr. Radha Abbott is a 64 yo male with stage D HFrEF, ICMP who underwent HM3 placement placed by Dr Washington on 12/1/23. Lengthy post op course complicated by vasoplegia(requiring Giaprezza), debility, malnutrition/impaired swallowing, and renal failure requiring HD (since weaned off). Once medically stable, he was transferred to Rehab for further PT/OT/ST. He had done well for a hile but then had several clinical events including; worsening renal failure and  COVID/pneumonia. Was discharged and readmitted again for C diff for which he was successfully treated. Then again readmitted last month for recurrent C diff infection.  He is on HD now on MWF (3 hours sessions). Also doing outpatient therapy He comes today for a follow-up visit. Doing better.  Has no cardiac complaints.  VAD speed is at 5000 rpm. Had few speed drops yesterday morning which coincided with his HD session. BP is 76 mm of Hg. DLES is a "1". Most recent INR was therapeutic at 2.5. LDH is at baseline.     Review of Systems   Constitutional: Negative. Negative for chills, decreased appetite, diaphoresis, fever, malaise/fatigue, night sweats, weight gain and weight loss.   Eyes: Negative.    Cardiovascular:  Negative for chest pain, claudication, cyanosis, dyspnea on exertion, irregular heartbeat, leg swelling, near-syncope, orthopnea, " "palpitations, paroxysmal nocturnal dyspnea and syncope.   Respiratory:  Negative for cough, hemoptysis and shortness of breath.    Endocrine: Negative.    Hematologic/Lymphatic: Negative.    Skin:  Negative for color change, dry skin and nail changes.   Musculoskeletal: Negative.    Gastrointestinal: Negative.    Genitourinary: Negative.    Neurological:  Negative for weakness.       Objective:   Blood pressure (!) 76/0, pulse 86, temperature 98.2 °F (36.8 °C), temperature source Oral, height 6' 1" (1.854 m), weight 77.1 kg (170 lb).body mass index is 22.43 kg/m².    Doppler: 76    Physical Exam  Vitals reviewed.   Constitutional:       Appearance: He is well-developed.      Comments: BP (!) 76/0 (BP Location: Left arm, Patient Position: Sitting)   Pulse 86   Temp 98.2 °F (36.8 °C) (Oral)   Ht 6' 1" (1.854 m)   Wt 77.1 kg (170 lb)   BMI 22.43 kg/m²      HENT:      Head: Normocephalic.   Neck:      Vascular: No carotid bruit or JVD.   Cardiovascular:      Heart sounds: No murmur heard.     Comments: Smooth VAD hum. DLES is "1"  Pulmonary:      Effort: Pulmonary effort is normal.      Breath sounds: Normal breath sounds.   Abdominal:      General: Bowel sounds are normal.      Palpations: Abdomen is soft.   Skin:     General: Skin is warm.   Neurological:      Mental Status: He is alert.         Lab Results   Component Value Date    WBC 5.51 06/11/2025    HGB 9.1 (L) 06/11/2025    HCT 31.8 (L) 06/11/2025    MCV 90 06/11/2025     06/11/2025    CO2 21 (L) 05/26/2025    CREATININE 6.5 (H) 05/26/2025    CALCIUM 7.7 (L) 05/26/2025    ALBUMIN 2.4 (L) 05/23/2025    AST 31 05/23/2025     (H) 05/12/2025    ALT 32 05/23/2025     (H) 05/11/2025       Lab Results   Component Value Date    INR 2.5 (H) 06/11/2025    INR 2.1 (H) 06/04/2025    INR 2.3 (H) 05/30/2025    PROTIME 25.9 (H) 06/11/2025    PROTIME 22.9 (H) 06/04/2025    PROTIME 25.1 (H) 05/30/2025       BNP   Date Value Ref Range Status "   05/12/2025 570 (H) 0 - 99 pg/mL Final     Comment:     Values of less than 100 pg/ml are consistent with non-CHF populations.    04/04/2025 561 (H) 0 - 99 pg/mL Final     Comment:     Values of less than 100 pg/ml are consistent with non-CHF populations.    04/02/2025 559 (H) 0 - 99 pg/mL Final     Comment:     Values of less than 100 pg/ml are consistent with non-CHF populations.        Lactate Dehydrogenase   Date Value Ref Range Status   05/11/2025 290 (H) 110 - 260 U/L Final   05/10/2025 308 (H) 110 - 260 U/L Final   04/04/2025 371 (H) 110 - 260 U/L Final     LD   Date Value Ref Range Status   03/20/2025 338 (H) 110 - 260 U/L Final     Comment:     Results are increased in hemolyzed samples.   03/12/2025 373 (H) 110 - 260 U/L Final     Comment:     Results are increased in hemolyzed samples.   03/11/2025 570 (H) 110 - 260 U/L Final     Comment:     Results are increased in hemolyzed samples.  *Result may be interfered by visible hemolysis         Assessment:      1. Presence of ventricular assist device    2. Chronic combined systolic and diastolic heart failure    3. ESRD (end stage renal disease)    4. PAF (paroxysmal atrial fibrillation)        Plan:   Patient is now NYHA class II but will benefit from outpatient rehab   Owing to his advanced kidney disease and being on dialysis with low BP, he is not on a  ACEI/ARB/ARNI or MRA. He is also not on a beta blocker due to RV dysfunction.   BP is controlled.  INR is therapeutic.   VAD interrogation was performed in clinic  Any VAD management kits dispensed today medically necessary  Recommend 2 gram sodium restriction and 1500cc fluid restriction.  Encourage physical activity with graded exercise program.  Requested patient to weigh themselves daily, and to notify us if their weight increases by more than 3 lbs in 1 day or 5 lbs in 1 week.   Additionally, the patient has a patient centered goal of being able to get stronger  RTC in 3 months      Listed for  transplant: No.      UNOS Patient Status  Functional Status: 40% - Disabled: requires special care and assistance  Physical Capacity: No Limitations  Working for Income: No  If no, reason not working: Demands of Treatment        Advance Care Planning  Patient has ACP docs in file.      Brayan Ocampo MD

## 2025-06-11 NOTE — PROGRESS NOTES
[facility]    2025 11:25 AM    Assessment/Plan:    Patient presents today with therapeutic INR.    Assessment of patient findings and chart review: no significant findings     Recommendation for patient's warfarin regimen: Continue current maintenance dose    Recommend repeat INR in 1 week  _________________________________________________________________    Radha Abbott (65 y.o.) is followed by the [facility].    Anticoagulation Summary  As of 2025      INR goal:  2.0-3.0   TTR:  69.0% (1.1 y)   INR used for dosin.5 (2025)   Warfarin maintenance plan:  2.5 mg (2.5 mg x 1) every Thu; 1.25 mg (2.5 mg x 0.5) all other days   Weekly warfarin total:  10 mg   Plan last modified:  Jesenia Jensen, PharmD (2025)   Next INR check:  2025   Target end date:  --    Indications    LVAD (left ventricular assist device) present [Z95.811]                 Anticoagulation Episode Summary       INR check location:  --    Preferred lab:  --    Send INR reminders to:  [facility] LVAD    Comments:  [place]          Anticoagulation Care Providers       Provider Role Specialty Phone number    Sajan Hurley MD [facility] [phone]

## 2025-06-11 NOTE — PROCEDURES
6/11/2025    11:32 AM 5/26/2025    12:00 PM 5/26/2025     8:22 AM 5/25/2025    11:10 PM 5/25/2025     8:22 PM 5/25/2025    12:00 PM 5/25/2025     7:31 AM   TXP PINA INTERROGATIONS   Type HeartMate3         Flow 3.5         Speed 5000         PI 7.1         Power ([place]) 3.5         LSL 4600         Pulsatility Intermittent pulse Pulse Pulse Intermittent pulse Pulse Pulse Pulse   }

## 2025-06-11 NOTE — LETTER
June 11, 2025        Kevin Franklin  [address] [place] [address]  Phone: [phone]  Fax: [phone]             [id number]  [address]  [facility] [address]  Phone: [phone]   Patient: Radha Abbott   MR Number: [id number]   YOB: 1960   Date of Visit: 6/11/2025       Dear Dr. Kevin Franklin    Thank you for referring Radha Abbott to me for evaluation. Attached you will find relevant portions of my assessment and plan of care.    If you have questions, please do not hesitate to call me. I look forward to following Radha Abbott along with you.    Sincerely,    Brayan Ocampo MD    Enclosure    If you would like to receive this communication electronically, please contact [email or web address] or [phone] to request [employer] Link access.    Son[employer]ation Link is a tool which provides read-only access to select patient information with whom you have a relationship. Its easy to use and provides real time access to review your patients record including encounter summaries, notes, results, and demographic information.    If you feel you have received this communication in error or would no longer like to receive these types of communications, please e-mail [email or web address]

## 2025-06-12 ENCOUNTER — TELEPHONE (OUTPATIENT)
Dept: TRANSPLANT | Facility: CLINIC | Age: 65
End: 2025-06-12
Payer: MEDICAID

## 2025-06-12 ENCOUNTER — CLINICAL SUPPORT (OUTPATIENT)
Dept: REHABILITATION | Facility: HOSPITAL | Age: 65
End: 2025-06-12
Payer: MEDICAID

## 2025-06-12 ENCOUNTER — TELEPHONE (OUTPATIENT)
Dept: PALLIATIVE MEDICINE | Facility: CLINIC | Age: 65
End: 2025-06-12
Payer: MEDICAID

## 2025-06-12 DIAGNOSIS — R29.898 DECREASED STRENGTH OF UPPER EXTREMITY: Primary | ICD-10-CM

## 2025-06-12 DIAGNOSIS — Z74.09 DECREASED STRENGTH, ENDURANCE, AND MOBILITY: ICD-10-CM

## 2025-06-12 DIAGNOSIS — R68.89 DECREASED STRENGTH, ENDURANCE, AND MOBILITY: ICD-10-CM

## 2025-06-12 DIAGNOSIS — R27.8 DECREASED COORDINATION: ICD-10-CM

## 2025-06-12 DIAGNOSIS — R53.1 GENERALIZED WEAKNESS: Primary | ICD-10-CM

## 2025-06-12 DIAGNOSIS — R53.1 DECREASED STRENGTH, ENDURANCE, AND MOBILITY: ICD-10-CM

## 2025-06-12 PROCEDURE — 97530 THERAPEUTIC ACTIVITIES: CPT | Mod: PN

## 2025-06-12 PROCEDURE — 97110 THERAPEUTIC EXERCISES: CPT | Mod: PN | Performed by: PHYSICAL THERAPIST

## 2025-06-12 NOTE — TELEPHONE ENCOUNTER
----- Message from Marguerite Rice MD sent at 6/11/2025  6:40 PM CDT -----  Regarding: RE: REJI  Patient should completed the taper as prescribed to reduce the risk of recurrence.  ----- Message -----  From: Ashanti Duncan RN  Sent: 6/11/2025  11:43 AM CDT  To: Alva Barry RN; Marguerite Rice MD; Br#  Subject: VANC                                             Hi Dr. Rice,Patient had a C. Diff test come back negative and they wanted to know if he should continue I told them most likely but would ask. He has 13 more days on it.Thanks,Oscar

## 2025-06-12 NOTE — PROGRESS NOTES
"  Outpatient Rehab    Occupational Therapy Visit    Today's Date: 6/12/2025  Name: Radha Abbott  Clinic Number: [id number]    Therapy Diagnosis:   Encounter Diagnoses   Name Primary?    Decreased strength of upper extremity Yes    Decreased coordination     Decreased strength, endurance, and mobility      Physician: Lilli Mckenzie MD    Physician Orders: OT eval and tx  Medical Diagnosis: LVAD (left ventricular assist device) present [Z95.811], Falls [R29.6]     Evaluation Date: 4/8/2025  Insurance Authorization Period: 4/7/2025 - 12/31/2025  Plan of Care Certification Period: 5/27/2025 - 8/22/2025  Progress Note Due: 6/27/2025     Visit # / Visits authorized: 12/20  FOTO: 1/3    Precautions: LVAD - no lifting >50#, chest tube from LVAD, DM, dialysis port (dialysis 3x/week M,W,F)    Time In: 1030  Time Out: 1125  Total Time: 55 minutes  Total Billable Time: 55 minutes    Subjective     Pt reports: "I fell this morning when I tried to step up on my curve. I think I had a stroke because I don't feel like I have control over my left leg."    he was compliant with home exercise program.  Response to previous treatment: good  Functional change: improved tolerance for exercises    Pain: 0/10  Location: BUE's    Objective   Objective measures updated at progress note unless specified.    Treatment     Radha received the treatments listed below:      therapeutic exercises to develop strength and ROM of BUE's for 55 minutes including:  - UBE x 6 min  - chest press with 5# dowel 3x10  - overhead press with 5# dowel 3x10  - bilateral shoulder flexion AROM with 5# dowel 3x10   - bilateral scap retraction AROM 3x10  - composite gripping with PHE (black spring, 3rd position) 5x10  - bilateral elbow flex/ext AROM, forearm 3 position, with 4# DB's x 15 reps each    therapeutic activities to improve functional performance of BUE's for 10 minutes including:  - resisted pron/sup with green flex bar ("smiles and frowns") 2x20 " each  - chair push-ups 3 sets of 5    neuromuscular re-education activities to improve Coordination and Proprioception of BUE's for 0 minutes including:  - NT    Home Exercises and Education Provided     Education provided:   - progress toward goals    Education Recipient:  [x] Patient [] Other recipient present    Patient Learning Style:  [x] Demonstration [x] Listening [] Pictures/video [] Reading [] Tactile    Patient Response to Education:  [x] Demonstrates understanding [x] Verbalizes understanding    [] Requires assistance [] Requires continuing/additional education    Written Home Exercises Provided: Patient instructed to continue previously-issued HEP.  See EMR under Patient Instructions for exercises provided during therapy sessions.      Assessment     Radha tolerated therapy slightly better today despite his report of falling this am. He continues to require cueing for safety with transfers as he tends to lean posteriorly.     Radha is progressing towards his goals. There are no updates to goals at this time. Pt prognosis is Fair.     Radha will continue to benefit from skilled outpatient occupational therapy services to address the deficits listed in the problem list on initial evaluation, to provide pt/family education, and to maximize pt's level of independence in the home and community environment.     Pt's spiritual, cultural and educational needs considered and pt agreeable to plan of care and goals.    Anticipated barriers to occupational therapy: LVAD - no lifting >50#, chest tube from LVAD, DM, dialysis port (dialysis 3x/week M,W,F), post COVID, chronic condition    Goals:   Active       Endurance       Pt will improve endurance deficit from severe to mild. (Progressing)       Start:  05/27/25    Expected End:  08/22/25               Fine hand motor use       Patient will complete 9-Hole Peg Test with his right dominant hand in <45 seconds. (Progressing)       Start:  04/08/25    Expected End:  " 06/27/25               Functional outcome       Pt will report an increase in FOTO intake score of > 55%, which would indicate an improvement in quality of life.  (Met)       Start:  04/08/25    Expected End:  07/01/25    Resolved:  05/06/25         Patient stated goal: "get my strength back up"  (Progressing)       Start:  04/08/25    Expected End:  08/22/25            Patient will demonstrate independence in home program for support of progression (Met)       Start:  04/08/25    Expected End:  05/06/25    Resolved:  05/06/25            Functional outcome       Pt will report an increase in FOTO intake score of > 67%, which would indicate an improvement in quality of life.  (Progressing)       Start:  05/27/25    Expected End:  08/22/25               Strength       Patient will achieve bilateral shoulder flexion strength of 4+/5 (Progressing)       Start:  04/08/25    Expected End:  08/22/25            Patient will achieve bilateral shoulder external rotation strength of 4/5 in neutral (Met)       Start:  04/08/25    Expected End:  07/01/25    Resolved:  05/06/25         Patient will achieve bilateral elbow flexion strength of 5/5 (Progressing)       Start:  04/08/25    Expected End:  08/22/25            Patient will achieve bilateral wrist flexion strength of 4/5 (Met)       Start:  04/08/25    Expected End:  07/01/25    Resolved:  05/06/25         Patient will achieve bilateral wrist extension strength of 4/5 (Met)       Start:  04/08/25    Expected End:  07/01/25    Resolved:  05/06/25         Patient will achieve bilateral  strength of 50# in the 2nd position (Progressing)       Start:  04/08/25    Expected End:  08/22/25            Patient will achieve bilateral lateral pinch strength of 9# (Progressing)       Start:  04/08/25    Expected End:  08/22/25                Plan     Continue occupational therapy services 1-2x/week for 12 weeks.    Updates/Grading for next session: progress occupational therapy " as tolerated    LYUDMILA DOCKERY, CHT

## 2025-06-12 NOTE — TELEPHONE ENCOUNTER
Called patient and spoke with wife, communicated to complete the abx as prescribed. Wife verbalized understanding and agreement.

## 2025-06-13 NOTE — PROCEDURES
Radha Abbott is a 65 y.o.   male patient, who presents for a 6 minute walk test ordered by MD Padmini.  The diagnosis is Left Ventricular Assist Device.  The patient's BMI is 24.7 kg/m2.  Predicted distance (lower limit of normal) is 411.21 meters.      Test Results:    The test was not completed.  The patient stopped 2 times for a total of 33 seconds.  The total time walked was 327 seconds.  During walking, the patient reported:   (legs weak). The patient used a walker  during testing.     06/13/2025---------Distance: 110.34 meters (362 feet)     O2 Sat % Supplemental Oxygen Heart Rate Blood Pressure Pavithra Scale   Pre-exercise  (Resting) 98 % Room Air  ramone   mmHg 0   During Exercise  ramone Room Air  ramone   mmHg     Post-exercise  (Recovery) ramone Room Air   ramone   mmHg 7     Recovery Time: 107 seconds  The patient walked the first 15 feet in 9.94 seconds.  Performing nurse/tech: James AN      PREVIOUS STUDY:   The patient had a previous study.  10/25/2024---------Distance: 137.16 meters (450 feet)       O2 Sat % Supplemental Oxygen Heart Rate Blood Pressure Pavithra Scale   Pre-exercise  (Resting) 99 % Room Air 30 bpm (!) 89/68 mmHg 1   During Exercise 95 % Room Air 89 bpm 103/74 mmHg 2   Post-exercise  (Recovery) 98 % Room Air  69 bpm   mmHg       Recovery Time: 97 seconds                                       The patient walked the first 15 feet in 6.63 seconds.    CLINICAL INTERPRETATION:  Six minute walk distance is 110.34 meters (362 feet) with very heavy dyspnea.  The patient reported non-pulmonary symptoms during exercise.  The patient did complete the study, walking 327 seconds of the 360 second test.  Since the previous study in 10/2024, exercise capacity may be somewhat worse.  Based upon age and body mass index, exercise capacity is less than predicted.

## 2025-06-13 NOTE — PROGRESS NOTES
"  Outpatient Rehab    Physical Therapy Visit    Patient Name: Radha Abbott  MRN: [id number]  YOB: 1960  Encounter Date: 6/12/2025    Therapy Diagnosis:   Encounter Diagnosis   Name Primary?    Generalized weakness Yes     Physician: Lilli Mckenzie MD    Physician Orders: Eval and Treat  Medical Diagnosis: LVAD (left ventricular assist device) present  Acute on chronic combined systolic and diastolic CHF, NYHA class 4  Surgical Diagnosis: Not applicable for this Episode   Surgical Date: Not applicable for this Episode    Visit # / Visits Authorized:  12 / 20  Insurance Authorization Period: 3/24/2025 to 12/31/2025  Date of Evaluation: 3/25/2025  Plan of Care Certification: 5/27/2025 to 7/11/2025      PT/PTA:     Number of PTA visits since last PT visit:   Time In: 0950   Time Out: 1030  Total Time (in minutes): 40   Total Billable Time (in minutes): 30    FOTO:  Intake Score:  %  Survey Score 2:  %  Survey Score 3:  %    Precautions:       Subjective   Pt reports that he fell this morning.  He was trying to step up onto the curb without any type of AD and lost his balance and fell backwards.  He reports that he had someone with him. He did not hit his head, he fell backwards onto his back. He does not have any increased pain anywhere and he is not dizzy..  Family / care giver present for this visit:     BLE    Objective            Treatment:          CPT Intervention  Low Back Duration / Intensity  06/12/2025 Duration / Intensity  06/10/2025 Duration / Intensity  06/03/2025   TE  Nustep for ROM and stretching 6' 6'      TE Walking around the gym     150 feet   TE Seated DF 3x10 3x10 3 x 10   TE Seated marching  3x10 3x10 3 x 10   TE Seated knee squeeze 3x10 3x10 3 x 10   TA Sit to stand   24" 2 x 5 24" 2 x 5   NR Heel Raises   2x10     NR Standing Abd   2x10     TE DKTC         TE LTR w/ball         TE HSS 3x15"       NR TA isometric         NR Quad sets 20x 20x 20x    NR LAQ   20x 2 x 10   NR SAQ  " 20x       TE Chin tucks          NR  SLR 2 x 10   2 x 10     NR  Sidelying hip abd          NR  clamshells          NR  Bridging 2 x 10   2 x 10   PLAN                 CPT Codes available for Billing:   (--) minutes of Manual therapy (MT) to improve pain and ROM.  (40) minutes of Therapeutic Exercise (TE) to develop strength, endurance, range of motion, and flexibility.  (--) minutes of Neuromuscular Re-Education (NR)  to improve: Balance, Coordination, Kinesthetic, Sense, Proprioception, and Posture.  (--) minutes of Therapeutic Activities (TA) to improve functional performance.  Unattended Electrical Stimulation (ES) for muscle performance or pain modulation.  Vasopneumatic Device Therapy () for management of swelling/edema. (52989)  BFR: Blood flow restriction applied during exercise  NP or (-): Not Performed       Assessment & Plan   Assessment: Pt presents after a fall.  After a mobility assessment he was the same as his last evaluation.  Therapy focused on strengthening to BLE.  Limited standing activities today.  He still tends to go into more extension and has decreased trunk control with lying down and sitting down.  Evaluation/Treatment Tolerance: Patient tolerated treatment well    The patient will continue to benefit from skilled outpatient physical therapy in order to address the deficits listed in the problem list on the initial evaluation, provide patient and family education, and maximize the patients level of independence in the home and community environments.     The patient's spiritual, cultural, and educational needs were considered, and the patient is agreeable to the plan of care and goals.           Plan:      Goals:   Active       Ambulation/movement       Patient will walk 250 feet in 2 minutes (Progressing)       Start:  03/25/25    Expected End:  07/11/25               Functional outcome       Patient will show a significant change in FOTO patient-reported outcome tool to demonstrate  subjective improvement (Progressing)       Start:  03/25/25    Expected End:  07/11/25            Patient stated goal: patient would like to not have pain in his lower back and have better balance.  (Progressing)       Start:  03/25/25    Expected End:  07/11/25            Patient will demonstrate independence in home program for support of progression (Met)       Start:  03/25/25    Expected End:  04/18/25    Resolved:  04/19/25            Pain       Patient will report pain of 5/10 demonstrating a reduction of overall pain (Progressing)       Start:  03/25/25    Expected End:  07/11/25               Range of Motion       Patient will achieve spinal flexion to 75% (Progressing)       Start:  03/25/25    Expected End:  07/11/25            Patient will achieve spinal extension to 75% (Progressing)       Start:  03/25/25    Expected End:  07/11/25                Yolanda Rothman, PT, DPT

## 2025-06-17 ENCOUNTER — CLINICAL SUPPORT (OUTPATIENT)
Dept: REHABILITATION | Facility: HOSPITAL | Age: 65
End: 2025-06-17
Payer: MEDICAID

## 2025-06-17 ENCOUNTER — LAB VISIT (OUTPATIENT)
Dept: LAB | Facility: HOSPITAL | Age: 65
End: 2025-06-17
Attending: INTERNAL MEDICINE
Payer: MEDICAID

## 2025-06-17 ENCOUNTER — ANTI-COAG VISIT (OUTPATIENT)
Dept: CARDIOLOGY | Facility: CLINIC | Age: 65
End: 2025-06-17
Payer: MEDICAID

## 2025-06-17 DIAGNOSIS — R53.1 DECREASED STRENGTH, ENDURANCE, AND MOBILITY: ICD-10-CM

## 2025-06-17 DIAGNOSIS — Z95.811 LVAD (LEFT VENTRICULAR ASSIST DEVICE) PRESENT: Primary | ICD-10-CM

## 2025-06-17 DIAGNOSIS — R53.1 GENERALIZED WEAKNESS: Primary | ICD-10-CM

## 2025-06-17 DIAGNOSIS — R27.8 DECREASED COORDINATION: ICD-10-CM

## 2025-06-17 DIAGNOSIS — R68.89 DECREASED STRENGTH, ENDURANCE, AND MOBILITY: ICD-10-CM

## 2025-06-17 DIAGNOSIS — R29.898 DECREASED STRENGTH OF UPPER EXTREMITY: Primary | ICD-10-CM

## 2025-06-17 DIAGNOSIS — Z95.811 PRESENCE OF VENTRICULAR ASSIST DEVICE: ICD-10-CM

## 2025-06-17 DIAGNOSIS — Z74.09 DECREASED STRENGTH, ENDURANCE, AND MOBILITY: ICD-10-CM

## 2025-06-17 LAB
INR PPP: 1.9 (ref 0.8–1.2)
PROTHROMBIN TIME: 20.5 SECONDS (ref 9–12.5)

## 2025-06-17 PROCEDURE — 97110 THERAPEUTIC EXERCISES: CPT | Mod: PN | Performed by: PHYSICAL THERAPIST

## 2025-06-17 PROCEDURE — 36415 COLL VENOUS BLD VENIPUNCTURE: CPT

## 2025-06-17 PROCEDURE — 93793 ANTICOAG MGMT PT WARFARIN: CPT | Mod: ,,,

## 2025-06-17 PROCEDURE — 97530 THERAPEUTIC ACTIVITIES: CPT | Mod: PN

## 2025-06-17 PROCEDURE — 85610 PROTHROMBIN TIME: CPT

## 2025-06-17 NOTE — PROGRESS NOTES
[facility]    2025 1:40 PM    Assessment/Plan:    Patient presents today with subtherapeutic  INR.    Assessment of patient findings and chart review: reports boost 1x/day     Recommendation for patient's warfarin regimen: Boost dose today to 2.5mg then resume current maintenance dose    Recommend repeat INR in 1 week  _________________________________________________________________    Radha Abbott (65 y.o.) is followed by the [facility].    Anticoagulation Summary  As of 2025      INR goal:  2.0-3.0   TTR:  69.5% (1.1 y)   INR used for dosin.9 (2025)   Warfarin maintenance plan:  2.5 mg (2.5 mg x 1) every Thu; 1.25 mg (2.5 mg x 0.5) all other days   Weekly warfarin total:  10 mg   Plan last modified:  Jesenia Jensen, PharmD (2025)   Next INR check:  2025   Target end date:  --    Indications    LVAD (left ventricular assist device) present [Z95.811]                 Anticoagulation Episode Summary       INR check location:  --    Preferred lab:  --    Send INR reminders to:  [facility]    Comments:  [place]          Anticoagulation Care Providers       Provider Role Specialty Phone number    Sajan Hurley MD [facility] [phone]

## 2025-06-17 NOTE — PROGRESS NOTES
Arlyn reports correct Warfarin dose, decreased appetite, 1 Boost daily, no other changes reported.

## 2025-06-17 NOTE — PROGRESS NOTES
"  Outpatient Rehab    Occupational Therapy Visit    Today's Date: 6/17/2025  Name: Radha Abbott  Clinic Number: [id number]    Therapy Diagnosis:   Encounter Diagnoses   Name Primary?    Decreased strength of upper extremity Yes    Decreased coordination     Decreased strength, endurance, and mobility      Physician: Lilli Mckenzie MD    Physician Orders: OT eval and tx  Medical Diagnosis: LVAD (left ventricular assist device) present [Z95.811], Falls [R29.6]     Evaluation Date: 4/8/2025  Insurance Authorization Period: 4/7/2025 - 12/31/2025  Plan of Care Certification Period: 5/27/2025 - 8/22/2025  Progress Note Due: 6/27/2025     Visit # / Visits authorized: 13/20  FOTO: 2/3     Precautions: LVAD - no lifting >50#, chest tube from LVAD, DM, dialysis port (dialysis 3x/week M,W,F)    Time In: 0945  Time Out: 1030  Total Time: 45 minutes  Total Billable Time: 45 minutes    Subjective     Pt reports: "I feeling pretty good today."    he was compliant with home exercise program.  Response to previous treatment: good  Functional change: improved tolerance for exercises    Pain: 0/10  Location: BUE's    Objective   Objective measures updated at progress note unless specified.    Treatment     Radha received the treatments listed below:      therapeutic exercises to develop strength and ROM of BUE's for 35 minutes including:  - UBE x 6 min  - chest press with 5# dowel 3x10  - overhead press with 5# dowel 3x10  - bilateral shoulder flexion AROM with 5# dowel 3x10   - bilateral scap retraction AROM 3x10  - composite gripping with PHE (black spring, 3rd position) 5x10  - bilateral elbow flex/ext AROM, forearm 3 position, with 4# DB's 2x10 each    therapeutic activities to improve functional performance of BUE's for 10 minutes including:  - resisted pron/sup with green flex bar ("smiles and frowns") 2x20 each  - chair push-ups 3 sets of 5  - stand pivot transfers with RW with SPV    neuromuscular re-education activities " to improve Coordination and Proprioception of BUE's for 0 minutes including:  - NT    Home Exercises and Education Provided     Education provided:   - progress toward goals    Education Recipient:  [x] Patient [] Other recipient present    Patient Learning Style:  [x] Demonstration [x] Listening [] Pictures/video [] Reading [] Tactile    Patient Response to Education:  [x] Demonstrates understanding [x] Verbalizes understanding    [] Requires assistance [] Requires continuing/additional education    Written Home Exercises Provided: Patient instructed to continue previously-issued HEP.  See EMR under Patient Instructions for exercises provided during therapy sessions.      Assessment     Radha tolerated therapy better today and required decreased rest breaks. He exhibited improved safety awareness with transfers.     Radha is progressing towards his goals. There are no updates to goals at this time. Pt prognosis is Fair.     Radha will continue to benefit from skilled outpatient occupational therapy services to address the deficits listed in the problem list on initial evaluation, to provide pt/family education, and to maximize pt's level of independence in the home and community environment.     Pt's spiritual, cultural and educational needs considered and pt agreeable to plan of care and goals.    Anticipated barriers to occupational therapy: LVAD - no lifting >50#, chest tube from LVAD, DM, dialysis port (dialysis 3x/week M,W,F), post COVID, chronic condition    Goals:   Active       Endurance       Pt will improve endurance deficit from severe to mild. (Progressing)       Start:  05/27/25    Expected End:  08/22/25               Fine hand motor use       Patient will complete 9-Hole Peg Test with his right dominant hand in <45 seconds. (Progressing)       Start:  04/08/25    Expected End:  06/27/25               Functional outcome       Pt will report an increase in FOTO intake score of > 55%, which would  "indicate an improvement in quality of life.  (Met)       Start:  04/08/25    Expected End:  07/01/25    Resolved:  05/06/25         Patient stated goal: "get my strength back up"  (Progressing)       Start:  04/08/25    Expected End:  08/22/25            Patient will demonstrate independence in home program for support of progression (Met)       Start:  04/08/25    Expected End:  05/06/25    Resolved:  05/06/25            Functional outcome       Pt will report an increase in FOTO intake score of > 67%, which would indicate an improvement in quality of life.  (Progressing)       Start:  05/27/25    Expected End:  08/22/25               Strength       Patient will achieve bilateral shoulder flexion strength of 4+/5 (Progressing)       Start:  04/08/25    Expected End:  08/22/25            Patient will achieve bilateral shoulder external rotation strength of 4/5 in neutral (Met)       Start:  04/08/25    Expected End:  07/01/25    Resolved:  05/06/25         Patient will achieve bilateral elbow flexion strength of 5/5 (Progressing)       Start:  04/08/25    Expected End:  08/22/25            Patient will achieve bilateral wrist flexion strength of 4/5 (Met)       Start:  04/08/25    Expected End:  07/01/25    Resolved:  05/06/25         Patient will achieve bilateral wrist extension strength of 4/5 (Met)       Start:  04/08/25    Expected End:  07/01/25    Resolved:  05/06/25         Patient will achieve bilateral  strength of 50# in the 2nd position (Progressing)       Start:  04/08/25    Expected End:  08/22/25            Patient will achieve bilateral lateral pinch strength of 9# (Progressing)       Start:  04/08/25    Expected End:  08/22/25                Plan     Continue occupational therapy services 1-2x/week for 12 weeks.    Updates/Grading for next session: progress occupational therapy as tolerated    LYUDMILA DOCKERY, CHAUNCEY    "

## 2025-06-18 NOTE — PROGRESS NOTES
"  Outpatient Rehab    Physical Therapy Visit    Patient Name: Radha Abbott  MRN: [id number]  YOB: 1960  Encounter Date: 6/17/2025    Therapy Diagnosis:   Encounter Diagnosis   Name Primary?    Generalized weakness Yes     Physician: Lilli Mckenzie MD    Physician Orders: Eval and Treat  Medical Diagnosis: LVAD (left ventricular assist device) present  Acute on chronic combined systolic and diastolic CHF, NYHA class 4  Surgical Diagnosis: Not applicable for this Episode   Surgical Date: Not applicable for this Episode  Days Since Last Surgery: Not applicable for this Episode    Visit # / Visits Authorized:  13 / 20  Insurance Authorization Period: 3/24/2025 to 12/31/2025  Date of Evaluation: 3/25/2025  Plan of Care Certification: 5/27/2025 to 7/11/2025      PT/PTA:     Number of PTA visits since last PT visit:   Time In: 1055   Time Out: 1135  Total Time (in minutes): 40   Total Billable Time (in minutes): 40    FOTO:  Intake Score:  %  Survey Score 2:  %  Survey Score 3:  %    Precautions:       Subjective             Objective            Treatment:          CPT Intervention  Low Back Duration / Intensity  06/17/2025 Duration / Intensity  06/12/2025 Duration / Intensity  06/10/2025   TE  Nustep for ROM and stretching 6' 6' 6'    TE Walking around the gym 150 feet       TE Seated DF 3x10 3x10 3x10   TE Seated marching  3x10 3x10 3x10   TE Seated knee squeeze NT 3x10 3x10   TA Sit to stand 2x10   24" 2 x 5   NR Heel Raises 2x10   2x10   NR Standing Abd 2x10   2x10   TE DKTC 3x10       TE LTR w/ball 3x10       TE HSS 3x15" 3x15"     NR TA isometric 10" x 10       NR Quad sets 2x10 20x 20x    NR LAQ 3x10   20x   NR SAQ  2x10 20x     TE Chin tucks 3x10        NR  SLR 2x10 2 x 10      NR  Sidelying hip abd -        NR  clamshells -        NR  Bridging 2x10 2 x 10     PLAN                 CPT Codes available for Billing:   (--) minutes of Manual therapy (MT) to improve pain and ROM.  (40) minutes of " Therapeutic Exercise (TE) to develop strength, endurance, range of motion, and flexibility.  (--) minutes of Neuromuscular Re-Education (NR)  to improve: Balance, Coordination, Kinesthetic, Sense, Proprioception, and Posture.  (--) minutes of Therapeutic Activities (TA) to improve functional performance.  Unattended Electrical Stimulation (ES) for muscle performance or pain modulation.  Vasopneumatic Device Therapy () for management of swelling/edema. (77709)  BFR: Blood flow restriction applied during exercise  NP or (-): Not Performed       Assessment & Plan   Assessment: Pt presents with improved overall endurance and feeling better.  He was able to stand and walk for extended period/length today.  He was not having any increased signs of fatigue and he showed less extensor mechanism today with sitting.    Evaluation/Treatment Tolerance: Patient tolerated treatment well    The patient will continue to benefit from skilled outpatient physical therapy in order to address the deficits listed in the problem list on the initial evaluation, provide patient and family education, and maximize the patients level of independence in the home and community environments.     The patient's spiritual, cultural, and educational needs were considered, and the patient is agreeable to the plan of care and goals.           Plan: Continue with POC from evaluation for 2x/week x 8 weeks    Goals:   Active       Ambulation/movement       Patient will walk 250 feet in 2 minutes (Progressing)       Start:  03/25/25    Expected End:  07/11/25               Functional outcome       Patient will show a significant change in FOTO patient-reported outcome tool to demonstrate subjective improvement (Progressing)       Start:  03/25/25    Expected End:  07/11/25            Patient stated goal: patient would like to not have pain in his lower back and have better balance.  (Progressing)       Start:  03/25/25    Expected End:  07/11/25             Patient will demonstrate independence in home program for support of progression (Met)       Start:  03/25/25    Expected End:  04/18/25    Resolved:  04/19/25            Pain       Patient will report pain of 5/10 demonstrating a reduction of overall pain (Progressing)       Start:  03/25/25    Expected End:  07/11/25               Range of Motion       Patient will achieve spinal flexion to 75% (Progressing)       Start:  03/25/25    Expected End:  07/11/25            Patient will achieve spinal extension to 75% (Progressing)       Start:  03/25/25    Expected End:  07/11/25                Yolanda Rothman, PT, DPT

## 2025-06-19 ENCOUNTER — CLINICAL SUPPORT (OUTPATIENT)
Dept: REHABILITATION | Facility: HOSPITAL | Age: 65
End: 2025-06-19
Payer: MEDICAID

## 2025-06-19 DIAGNOSIS — R27.8 DECREASED COORDINATION: ICD-10-CM

## 2025-06-19 DIAGNOSIS — R53.1 DECREASED STRENGTH, ENDURANCE, AND MOBILITY: ICD-10-CM

## 2025-06-19 DIAGNOSIS — Z74.09 DECREASED STRENGTH, ENDURANCE, AND MOBILITY: ICD-10-CM

## 2025-06-19 DIAGNOSIS — R68.89 DECREASED STRENGTH, ENDURANCE, AND MOBILITY: ICD-10-CM

## 2025-06-19 DIAGNOSIS — R29.898 DECREASED STRENGTH OF UPPER EXTREMITY: Primary | ICD-10-CM

## 2025-06-19 PROCEDURE — 97530 THERAPEUTIC ACTIVITIES: CPT | Mod: PN

## 2025-06-19 NOTE — PROGRESS NOTES
"  Outpatient Rehab    Occupational Therapy Visit    Today's Date: 6/19/2025  Name: Radha Abbott  Clinic Number: [id number]    Therapy Diagnosis:   Encounter Diagnoses   Name Primary?    Decreased strength of upper extremity Yes    Decreased coordination     Decreased strength, endurance, and mobility      Physician: Lilli Mckenzie MD    Physician Orders: OT eval and tx  Medical Diagnosis: LVAD (left ventricular assist device) present [Z95.811], Falls [R29.6]     Evaluation Date: 4/8/2025  Insurance Authorization Period: 4/7/2025 - 12/31/2025  Plan of Care Certification Period: 5/27/2025 - 8/22/2025  Progress Note Due: 6/27/2025     Visit # / Visits authorized: 14/20  FOTO: 2/3     Precautions: LVAD - no lifting >50#, chest tube from LVAD, DM, dialysis port (dialysis 3x/week M,W,F)    Time In: 1010  Time Out: 1055  Total Time: 45 minutes  Total Billable Time: 45 minutes    Subjective     Pt reports: "I'm a little tired today but I want to work."    he was compliant with home exercise program.  Response to previous treatment: good  Functional change: improved tolerance for exercises    Pain: 0/10  Location: BUE's    Objective   Objective measures updated at progress note unless specified.    Treatment     Radha received the treatments listed below:      therapeutic exercises to develop strength and ROM of BUE's for 30 minutes including:  - UBE x 6 min  - chest press with bilateral 3# DB's 3x10  - overhead press with bilateral 3# DB's 3x10  - bilateral shoulder flexion AROM with 5# dowel 3x10   - bilateral scap retraction AROM 3x10  - composite gripping with PHE (black spring, 4th position) 5x10  - bilateral elbow flex/ext AROM, forearm 3 position, with 4# DB's 2x10 each    therapeutic activities to improve functional performance of BUE's for 15 minutes including:  - resisted pron/sup with green flex bar ("smiles and frowns") 2x20 each  - chair push-ups 3 sets of 10  - stand pivot transfers with RW with SPV and max " verbal cueing for safety  - throwing/catching ball off rebounder x 2 min total with bilateral 2# wrist cuffs (resting ~ every 10 seconds)    neuromuscular re-education activities to improve Coordination and Proprioception of BUE's for 0 minutes including:  - NT    Home Exercises and Education Provided     Education provided:   - progress toward goals    Education Recipient:  [x] Patient [] Other recipient present    Patient Learning Style:  [x] Demonstration [x] Listening [] Pictures/video [] Reading [] Tactile    Patient Response to Education:  [x] Demonstrates understanding [x] Verbalizes understanding    [] Requires assistance [] Requires continuing/additional education    Written Home Exercises Provided: Patient instructed to continue previously-issued HEP.  See EMR under Patient Instructions for exercises provided during therapy sessions.      Assessment     Radha continues to be limited by decreased overall strength and endurance. He also continues to exhibit decreased safety awareness with his sit<-->stand, leaning posteriorly and not exhibiting any protective reactions.    Radha is progressing towards his goals. There are no updates to goals at this time. Pt prognosis is Fair.     Radha will continue to benefit from skilled outpatient occupational therapy services to address the deficits listed in the problem list on initial evaluation, to provide pt/family education, and to maximize pt's level of independence in the home and community environment.     Pt's spiritual, cultural and educational needs considered and pt agreeable to plan of care and goals.    Anticipated barriers to occupational therapy: LVAD - no lifting >50#, chest tube from LVAD, DM, dialysis port (dialysis 3x/week M,W,F), post COVID, chronic condition    Goals:   Active       Endurance       Pt will improve endurance deficit from severe to mild. (Progressing)       Start:  05/27/25    Expected End:  08/22/25               Fine hand motor  "use       Patient will complete 9-Hole Peg Test with his right dominant hand in <45 seconds. (Progressing)       Start:  04/08/25    Expected End:  06/27/25               Functional outcome       Pt will report an increase in FOTO intake score of > 55%, which would indicate an improvement in quality of life.  (Met)       Start:  04/08/25    Expected End:  07/01/25    Resolved:  05/06/25         Patient stated goal: "get my strength back up"  (Progressing)       Start:  04/08/25    Expected End:  08/22/25            Patient will demonstrate independence in home program for support of progression (Met)       Start:  04/08/25    Expected End:  05/06/25    Resolved:  05/06/25            Functional outcome       Pt will report an increase in FOTO intake score of > 67%, which would indicate an improvement in quality of life.  (Progressing)       Start:  05/27/25    Expected End:  08/22/25               Strength       Patient will achieve bilateral shoulder flexion strength of 4+/5 (Progressing)       Start:  04/08/25    Expected End:  08/22/25            Patient will achieve bilateral shoulder external rotation strength of 4/5 in neutral (Met)       Start:  04/08/25    Expected End:  07/01/25    Resolved:  05/06/25         Patient will achieve bilateral elbow flexion strength of 5/5 (Progressing)       Start:  04/08/25    Expected End:  08/22/25            Patient will achieve bilateral wrist flexion strength of 4/5 (Met)       Start:  04/08/25    Expected End:  07/01/25    Resolved:  05/06/25         Patient will achieve bilateral wrist extension strength of 4/5 (Met)       Start:  04/08/25    Expected End:  07/01/25    Resolved:  05/06/25         Patient will achieve bilateral  strength of 50# in the 2nd position (Progressing)       Start:  04/08/25    Expected End:  08/22/25            Patient will achieve bilateral lateral pinch strength of 9# (Progressing)       Start:  04/08/25    Expected End:  08/22/25       "          Plan     Continue occupational therapy services 1-2x/week for 12 weeks.    Updates/Grading for next session: progress occupational therapy as tolerated    LYUDMILA DOCKERY, CHT

## 2025-06-24 ENCOUNTER — ANTI-COAG VISIT (OUTPATIENT)
Dept: CARDIOLOGY | Facility: CLINIC | Age: 65
End: 2025-06-24
Payer: MEDICAID

## 2025-06-24 ENCOUNTER — LAB VISIT (OUTPATIENT)
Dept: LAB | Facility: HOSPITAL | Age: 65
End: 2025-06-24
Attending: INTERNAL MEDICINE
Payer: MEDICAID

## 2025-06-24 DIAGNOSIS — Z95.811 LVAD (LEFT VENTRICULAR ASSIST DEVICE) PRESENT: Primary | ICD-10-CM

## 2025-06-24 DIAGNOSIS — Z95.811 PRESENCE OF VENTRICULAR ASSIST DEVICE: ICD-10-CM

## 2025-06-24 LAB
INR PPP: 2.7 (ref 0.8–1.2)
PROTHROMBIN TIME: 28.9 SECONDS (ref 9–12.5)

## 2025-06-24 PROCEDURE — 93793 ANTICOAG MGMT PT WARFARIN: CPT | Mod: ,,,

## 2025-06-24 PROCEDURE — 85610 PROTHROMBIN TIME: CPT

## 2025-06-24 PROCEDURE — 36415 COLL VENOUS BLD VENIPUNCTURE: CPT

## 2025-06-24 NOTE — PROGRESS NOTES
[facility]    2025 1:36 PM    Assessment/Plan:    Patient presents today with therapeutic INR.    Assessment of patient findings and chart review: no significant findings     Recommendation for patient's warfarin regimen: Continue current maintenance dose    Recommend repeat INR in 1 week  _________________________________________________________________    Radha Abbott (65 y.o.) is followed by the [facility].    Anticoagulation Summary  As of 2025      INR goal:  2.0-3.0   TTR:  69.8% (1.2 y)   INR used for dosin.7 (2025)   Warfarin maintenance plan:  2.5 mg (2.5 mg x 1) every Thu; 1.25 mg (2.5 mg x 0.5) all other days   Weekly warfarin total:  10 mg   Plan last modified:  Jesenia Jensen, PharmD (2025)   Next INR check:  2025   Target end date:  --    Indications    LVAD (left ventricular assist device) present [Z95.811]                 Anticoagulation Episode Summary       INR check location:  --    Preferred lab:  --    Send INR reminders to:  [facility] LVAD    Comments:  [place]          Anticoagulation Care Providers       Provider Role Specialty Phone number    Sajan Hurley MD [facility] Card[facility] [phone]

## 2025-06-26 ENCOUNTER — TELEPHONE (OUTPATIENT)
Dept: REHABILITATION | Facility: HOSPITAL | Age: 65
End: 2025-06-26
Payer: MEDICAID

## 2025-06-27 ENCOUNTER — HOSPITAL ENCOUNTER (EMERGENCY)
Facility: HOSPITAL | Age: 65
Discharge: HOME | End: 2025-06-27
Attending: EMERGENCY MEDICINE
Payer: MEDICAID

## 2025-06-27 VITALS
TEMPERATURE: 98 F | HEART RATE: 78 BPM | OXYGEN SATURATION: 100 % | RESPIRATION RATE: 14 BRPM | DIASTOLIC BLOOD PRESSURE: 81 MMHG | SYSTOLIC BLOOD PRESSURE: 113 MMHG

## 2025-06-27 DIAGNOSIS — S09.90XA INJURY OF HEAD, INITIAL ENCOUNTER: Primary | ICD-10-CM

## 2025-06-27 DIAGNOSIS — R07.9 CHEST PAIN: ICD-10-CM
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
## 2025-06-27 LAB
ABSOLUTE EOSINOPHIL (OHS): 0.09 K/UL
ABSOLUTE MONOCYTE (OHS): 0.25 K/UL (ref 0.3–1)
ABSOLUTE NEUTROPHIL COUNT (OHS): 3.59 K/UL (ref 1.8–7.7)
ALBUMIN SERPL BCP-MCNC: 2.2 G/DL (ref 3.5–5.2)
ALP SERPL-CCNC: 124 UNIT/L (ref 40–150)
ALT SERPL W/O P-5'-P-CCNC: 81 UNIT/L (ref 10–44)
ANION GAP (OHS): 10 MMOL/L (ref 8–16)
AST SERPL-CCNC: 91 UNIT/L (ref 11–45)
BASOPHILS # BLD AUTO: 0.02 K/UL
BASOPHILS NFR BLD AUTO: 0.5 %
BILIRUB SERPL-MCNC: 0.5 MG/DL (ref 0.1–1)
BUN SERPL-MCNC: 20 MG/DL (ref 8–23)
CALCIUM SERPL-MCNC: 7.6 MG/DL (ref 8.7–10.5)
CHLORIDE SERPL-SCNC: 104 MMOL/L (ref 95–110)
CO2 SERPL-SCNC: 21 MMOL/L (ref 23–29)
CREAT SERPL-MCNC: 5.4 MG/DL (ref 0.5–1.4)
ERYTHROCYTE [DISTWIDTH] IN BLOOD BY AUTOMATED COUNT: 22.2 % (ref 11.5–14.5)
GFR SERPLBLD CREATININE-BSD FMLA CKD-EPI: 11 ML/MIN/1.73/M2
GLUCOSE SERPL-MCNC: 112 MG/DL (ref 70–110)
HCT VFR BLD AUTO: 32.4 % (ref 40–54)
HGB BLD-MCNC: 9.8 GM/DL (ref 14–18)
HOLD SPECIMEN: NORMAL
IMM GRANULOCYTES # BLD AUTO: 0.02 K/UL (ref 0–0.04)
IMM GRANULOCYTES NFR BLD AUTO: 0.5 % (ref 0–0.5)
LYMPHOCYTES # BLD AUTO: 0.28 K/UL (ref 1–4.8)
MCH RBC QN AUTO: 27.4 PG (ref 27–31)
MCHC RBC AUTO-ENTMCNC: 30.2 G/DL (ref 32–36)
MCV RBC AUTO: 91 FL (ref 82–98)
NUCLEATED RBC (/100WBC) (OHS): 0 /100 WBC
OHS QRS DURATION: 180 MS
OHS QTC CALCULATION: 720 MS
PLATELET # BLD AUTO: 155 K/UL (ref 150–450)
PMV BLD AUTO: 9.3 FL (ref 9.2–12.9)
POTASSIUM SERPL-SCNC: 3.2 MMOL/L (ref 3.5–5.1)
PROT SERPL-MCNC: 6.2 GM/DL (ref 6–8.4)
RBC # BLD AUTO: 3.58 M/UL (ref 4.6–6.2)
RELATIVE EOSINOPHIL (OHS): 2.1 %
RELATIVE LYMPHOCYTE (OHS): 6.6 % (ref 18–48)
RELATIVE MONOCYTE (OHS): 5.9 % (ref 4–15)
RELATIVE NEUTROPHIL (OHS): 84.4 % (ref 38–73)
SODIUM SERPL-SCNC: 135 MMOL/L (ref 136–145)
WBC # BLD AUTO: 4.25 K/UL (ref 3.9–12.7)

## 2025-06-27 PROCEDURE — 99285 EMERGENCY DEPT VISIT HI MDM: CPT | Mod: 25

## 2025-06-27 PROCEDURE — 93005 ELECTROCARDIOGRAM TRACING: CPT

## 2025-06-27 PROCEDURE — 93010 ELECTROCARDIOGRAM REPORT: CPT | Mod: ,,, | Performed by: INTERNAL MEDICINE

## 2025-06-27 PROCEDURE — 85025 COMPLETE CBC W/AUTO DIFF WBC: CPT | Performed by: EMERGENCY MEDICINE

## 2025-06-27 PROCEDURE — 80053 COMPREHEN METABOLIC PANEL: CPT | Performed by: EMERGENCY MEDICINE

## 2025-06-27 NOTE — ED PROVIDER NOTES
SCRIBE #1 NOTE: I, Rebeca Beverly, am scribing for, and in the presence of, Osorio Sepulveda MD. I have scribed the entire note.       History     Chief Complaint   Patient presents with    Fall     Fall at dialysis. Hit head and on Warfarin Has LVAD device   More weak today and usually uses walker      Review of patient's allergies indicates:  No Known Allergies      History of Present Illness     HPI    6/27/2025, 7:38 AM  History obtained from the patient, medical records, wife, and EMS      History of Present Illness: Radha Abbott is a 65 y.o. male patient with a PMHx of aspiration pneumonia of both lungs, CAD, CHF, delirium, HFrEF, hypoglycemia, ICD, LVAD, MI, PAF, stage 4 CKD, and T2DM who presents to the Emergency Department for evaluation after a fall at dialysis today. Pt's wife reports that he fell after missing the chair trying to sit down. Pt did strike his head. He is currently anticoagulated with Warfarin. EMS reports mild BLE weakness and complaints of HA from the patient. His BP en route was 108/79. Patient denies any CP, SOB, fever, nausea, vomiting, or syncope. Pt ambulates with a cane at baseline.  Him in his wife are in the room he reports that he has been eating fine it does not feel weak or dehydrated otherwise.  No prior Tx specified.  No further complaints or concerns at this time.       Arrival mode: EMS    PCP: Vasu Kong MD        Past Medical History:  Past Medical History:   Diagnosis Date    Aspiration pneumonia of both lungs 06/17/2024    CAD (coronary artery disease)     CHF (congestive heart failure)     Delirium 06/16/2024    Diabetes mellitus     HFrEF (heart failure with reduced ejection fraction)     Hypoglycemia 06/12/2024    ICD (implantable cardioverter-defibrillator) in place     LVAD (left ventricular assist device) present 12/01/2023    MI, old     PAF (paroxysmal atrial fibrillation) 10/11/2023    Stage 4 chronic kidney disease 02/15/2024    Type 2  diabetes mellitus without complication, without long-term current use of insulin 10/07/2023       Past Surgical History:  Past Surgical History:   Procedure Laterality Date    ANGIOPLASTY-VENOUS ARTERY Right 12/1/2023    Procedure: ANGIOPLASTY-VENOUS ARTERY, RIGHT FEMORAL;  Surgeon: Yuri Washington MD;  Location: [facility] OR [facility] FLR;  Service: Cardiovascular;  Laterality: Right;    AORTIC VALVULOPLASTY N/A 12/1/2023    Procedure: REPAIR, AORTIC VALVE;  Surgeon: Yuri Washington MD;  [facility];  Service: Cardiovascular;  Laterality: N/A;    CARDIAC SURGERY      CLOSURE N/A 12/1/2023    Procedure: CLOSURE, TEMPORARY;  Surgeon: Yuri Washington MD;  [facility];  Service: Cardiovascular;  Laterality: N/A;    DRAINAGE OF PLEURAL EFFUSION  12/4/2023    Procedure: DRAINAGE, PLEURAL EFFUSION;  Surgeon: Yuri Washington MD;  [facility];  Service: Cardiovascular;;    INSERTION OF GRAFT TO PERICARDIUM  12/4/2023    Procedure: INSERTION, GRAFT, PERICARDIUM;  Surgeon: Yuri Washington MD;  [facility];  Service: Cardiovascular;;    INSERTION OF INTRA-AORTIC BALLOON ASSIST DEVICE Right 11/21/2023    Procedure: INSERTION, INTRA-AORTIC BALLOON PUMP;  Surgeon: Finn Cohn MD;  Location: [facility] CATH LAB;  Service: Cardiology;  Laterality: Right;    LEFT VENTRICULAR ASSIST DEVICE Left 12/1/2023    Procedure: INSERTION-LEFT VENTRICULAR ASSIST DEVICE;  Surgeon: Yuri Washington MD;  [facility];  Service: Cardiovascular;  Laterality: Left;  REDO STERNOTOMY - REDO SAW NEEDED FOR CASE    LYSIS OF ADHESIONS  12/1/2023    Procedure: LYSIS, ADHESIONS;  Surgeon: Yuri Washington MD;  Location: [facility] OR [facility] FLR;  Service: Cardiovascular;;    PLACEMENT OF SWAN ROLANDO CATHETER WITH IMAGING GUIDANCE  11/20/2023    Procedure: INSERTION, CATHETER, SWAN-ROLANDO, WITH IMAGING GUIDANCE;  Surgeon: Sajan [facility] MD;  Location: [facility] CATH LAB;  Service: Cardiology;;    REMOVAL OF  TUNNELED CENTRAL VENOUS CATHETER (CVC) N/A 3/1/2024    Procedure: REMOVAL, CATHETER, CENTRAL VENOUS, TUNNELED;  Surgeon: Seble Aguilar MD;  Location: [facility] CATH LAB;  Service: Interventional Nephrology;  Laterality: N/A;    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 12/1/2023    Procedure: REPAIR, ANEURYSM, ARTERY, FEMORAL;  Surgeon: Yuri Washington MD;  Location: [facility] OR [facility] FLR;  Service: Cardiovascular;  Laterality: Right;  Right Femoral Artery Repair    RIGHT HEART CATHETERIZATION Right 10/10/2023    Procedure: INSERTION, CATHETER, RIGHT HEART;  Surgeon: Bin Gandhi MD;  Location: [facility] CATH LAB;  Service: Cardiology;  Laterality: Right;    RIGHT HEART CATHETERIZATION Right 10/13/2023    Procedure: INSERTION, CATHETER, RIGHT HEART;  Surgeon: Walter Mcintyre MD;  Location: [facility] CATH LAB;  Service: Cardiology;  Laterality: Right;    RIGHT HEART CATHETERIZATION  11/13/2023    RIGHT HEART CATHETERIZATION Right 11/13/2023    Procedure: INSERTION, CATHETER, RIGHT HEART;  Surgeon: Juventino Bermudez Jr., MD;  Location: [facility] CATH LAB;  Service: Cardiology;  Laterality: Right;    RIGHT HEART CATHETERIZATION Right 11/20/2023    Procedure: INSERTION, CATHETER, RIGHT HEART;  Surgeon: Sajan [facility] MD;  Location: [facility] CATH LAB;  Service: Cardiology;  Laterality: Right;    RIGHT HEART CATHETERIZATION Right 1/22/2024    Procedure: INSERTION, CATHETER, RIGHT HEART;  Surgeon: Brayan Ocampo MD;  Location: [facility] CATH LAB;  Service: Cardiology;  Laterality: Right;    STERNAL WOUND CLOSURE N/A 12/4/2023    Procedure: CLOSURE, WOUND, STERNUM;  Surgeon: Yuri Washington MD;  Location: [facility] OR 2ND FLR;  Service: Cardiovascular;  Laterality: N/A;    STERNOTOMY N/A 12/1/2023    Procedure: STERNOTOMY, REDO;  Surgeon: Yuri Washington MD;  Location: [facility] OR 2ND FLR;  Service: Cardiovascular;  Laterality: N/A;    VALVULOPLASTY, MITRAL VALVE N/A 12/1/2023    Procedure: VALVULOPLASTY, MITRAL VALVE;  Surgeon: Yuri Washington MD;   Location: [facility] OR [address] [place] [address];  Service: Cardiovascular;  Laterality: N/A;         Family History:  No family history on file.    Social History:  Social History     Tobacco Use    Smoking status: Former     Current packs/day: 0.50     Types: Cigarettes    Smokeless tobacco: Not on file   Substance and Sexual Activity    Alcohol use: Yes     Comment: rarely    Drug use: No    Sexual activity: Not Currently     Partners: Female        Review of Systems     Review of Systems   Constitutional:  Negative for chills and fever.   HENT:  Negative for sore throat.    Respiratory:  Negative for shortness of breath.    Cardiovascular:  Negative for chest pain.   Gastrointestinal:  Negative for nausea and vomiting.   Genitourinary:  Negative for dysuria.   Musculoskeletal:  Negative for back pain.   Skin:  Negative for rash.   Neurological:  Positive for weakness (mild BLE) and headaches. Negative for syncope.   Hematological:  Does not bruise/bleed easily.   All other systems reviewed and are negative.     Physical Exam     Initial Vitals [06/27/25 0725]   BP Pulse Resp Temp SpO2   108/79 83 18 98 °F (36.7 °C) 98 %      MAP       --          Physical Exam  Nursing Notes and Vital Signs Reviewed.  Constitutional: Patient is in no acute distress. Appears chronically ill.   Head: Atraumatic. Normocephalic.  ENT: Mucous membranes are moist. Oropharynx is clear and symmetric.    Neck: Supple. Full ROM. No lymphadenopathy.  Cardiovascular: LVAD machine noise, no S1 or S2. Vas Cath to right upper chest wall  Pulmonary/Chest: No respiratory distress. Clear to auscultation bilaterally. No wheezing or rales.  Abdominal: Soft and non-distended. There is no tenderness.  No rebound, guarding, or rigidity. Good bowel sounds.  Musculoskeletal: Moves all extremities. No obvious deformities. No edema. No calf tenderness.  Skin: Warm and dry.  Neurological:  Alert, awake, and appropriate.  Mildly slurred speech.  No acute focal neurological  deficits are appreciated.  Psychiatric: Flat affect. Good eye contact. Appropriate in content.     ED Course   Procedures  ED Vital Signs:  Vitals:    06/27/25 0725 06/27/25 0930   BP: 108/79 113/81   Pulse: 83 78   Resp: 18 14   Temp: 98 °F (36.7 °C)    TempSrc: Oral    SpO2: 98% 100%       Abnormal Lab Results:  Labs Reviewed   COMPREHENSIVE METABOLIC PANEL - Abnormal       Result Value    Sodium 135 (*)     Potassium 3.2 (*)     Chloride 104      CO2 21 (*)     Glucose 112 (*)     BUN 20      Creatinine 5.4 (*)     Calcium 7.6 (*)     Protein Total 6.2      Albumin 2.2 (*)     Bilirubin Total 0.5            AST 91 (*)     ALT 81 (*)     Anion Gap 10      eGFR 11 (*)    CBC WITH DIFFERENTIAL - Abnormal    WBC 4.25      RBC 3.58 (*)     HGB 9.8 (*)     HCT 32.4 (*)     MCV 91      MCH 27.4      MCHC 30.2 (*)     RDW 22.2 (*)     Platelet Count 155      MPV 9.3      Nucleated RBC 0      Neut % 84.4 (*)     Lymph % 6.6 (*)     Mono % 5.9      Eos % 2.1      Basophil % 0.5      Imm Grans % 0.5      Neut # 3.59      Lymph # 0.28 (*)     Mono # 0.25 (*)     Eos # 0.09      Baso # 0.02      Imm Grans # 0.02      Narrative:     This is an appended report.  These results have been appended to a previously verified report.   CBC W/ AUTO DIFFERENTIAL    Narrative:     The following orders were created for panel order CBC auto differential.  Procedure                               Abnormality         Status                     ---------                               -----------         ------                     CBC with Differential[[id number]]       Abnormal            Final result                 Please view results for these tests on the individual orders.   EXTRA TUBES    Narrative:     The following orders were created for panel order EXTRA TUBES.  Procedure                               Abnormality         Status                     ---------                               -----------         ------                      Light Blue Top Hold[[id number]]                             Final result               Light Green Top Hold[[id number]]                            Final result               Lavender Top Hold[[id number]]                               Final result               Gold Top Hold[[id number]]                                   Final result               Gold Top Hold[[id number]]                                   Final result                 Please view results for these tests on the individual orders.   LIGHT BLUE TOP HOLD    Extra Tube Hold for add-ons.     LIGHT GREEN TOP HOLD    Extra Tube Hold for add-ons.     LAVENDER TOP HOLD    Extra Tube Hold for add-ons.     GOLD TOP HOLD    Extra Tube Hold for add-ons.     GOLD TOP HOLD    Extra Tube Hold for add-ons.          All Lab Results:  Results for orders placed or performed during the hospital encounter of 06/27/25   EKG 12-lead    Collection Time: 06/27/25  7:42 AM   Result Value Ref Range    QRS Duration 180 ms    [facility] QTC Calculation 720 ms   Comprehensive metabolic panel    Collection Time: 06/27/25  8:03 AM   Result Value Ref Range    Sodium 135 (L) 136 - 145 mmol/L    Potassium 3.2 (L) 3.5 - 5.1 mmol/L    Chloride 104 95 - 110 mmol/L    CO2 21 (L) 23 - 29 mmol/L    Glucose 112 (H) 70 - 110 mg/dL    BUN 20 8 - 23 mg/dL    Creatinine 5.4 (H) 0.5 - 1.4 mg/dL    Calcium 7.6 (L) 8.7 - 10.5 mg/dL    Protein Total 6.2 6.0 - 8.4 gm/dL    Albumin 2.2 (L) 3.5 - 5.2 g/dL    Bilirubin Total 0.5 0.1 - 1.0 mg/dL     40 - 150 unit/L    AST 91 (H) 11 - 45 unit/L    ALT 81 (H) 10 - 44 unit/L    Anion Gap 10 8 - 16 mmol/L    eGFR 11 (L) >60 mL/min/1.73/m2   CBC with Differential    Collection Time: 06/27/25  8:03 AM   Result Value Ref Range    WBC 4.25 3.90 - 12.70 K/uL    RBC 3.58 (L) 4.60 - 6.20 M/uL    HGB 9.8 (L) 14.0 - 18.0 gm/dL    HCT 32.4 (L) 40.0 - 54.0 %    MCV 91 82 - 98 fL    MCH 27.4 27.0 - 31.0 pg    MCHC 30.2 (L) 32.0 - 36.0 g/dL    RDW 22.2 (H) 11.5 - 14.5  %    Platelet Count 155 150 - 450 K/uL    MPV 9.3 9.2 - 12.9 fL    Nucleated RBC 0 <=0 /100 WBC    Neut % 84.4 (H) 38 - 73 %    Lymph % 6.6 (L) 18 - 48 %    Mono % 5.9 4 - 15 %    Eos % 2.1 <=8 %    Basophil % 0.5 <=1.9 %    Imm Grans % 0.5 0.0 - 0.5 %    Neut # 3.59 1.8 - 7.7 K/uL    Lymph # 0.28 (L) 1 - 4.8 K/uL    Mono # 0.25 (L) 0.3 - 1 K/uL    Eos # 0.09 <=0.5 K/uL    Baso # 0.02 <=0.2 K/uL    Imm Grans # 0.02 0.00 - 0.04 K/uL   Light Blue Top Hold    Collection Time: 06/27/25  8:03 AM   Result Value Ref Range    Extra Tube Hold for add-ons.    Light Green Top Hold    Collection Time: 06/27/25  8:03 AM   Result Value Ref Range    Extra Tube Hold for add-ons.    Lavender Top Hold    Collection Time: 06/27/25  8:03 AM   Result Value Ref Range    Extra Tube Hold for add-ons.    Gold Top Hold    Collection Time: 06/27/25  8:03 AM   Result Value Ref Range    Extra Tube Hold for add-ons.    Gold Top Hold    Collection Time: 06/27/25  8:03 AM   Result Value Ref Range    Extra Tube Hold for add-ons.      *Note: Due to a large number of results and/or encounters for the requested time period, some results have not been displayed. A complete set of results can be found in Results Review.       Imaging Results:  Imaging Results              X-Ray Chest AP Portable (Final result)  Result time 06/27/25 09:01:07      Final result by Gino Adair MD (06/27/25 09:01:07)                   Impression:      1.  Negative for acute process involving the chest.    2.  Stable findings as noted above.      Electronically signed by: Gino Adair MD  Date:    06/27/2025  Time:    09:01               Narrative:    EXAMINATION:  XR CHEST AP PORTABLE    CLINICAL HISTORY:  Chest Pain;    COMPARISON:  May 12, 2025    FINDINGS:  Numerous life-saving devices overlie the chest.  Stable scarring or atelectasis in the left lung base.  The lungs are free of new pulmonary opacities.  The cardiac silhouette size is normal. The trachea is  midline and the mediastinal width is normal. Negative for focal infiltrate, effusion or pneumothorax. Pulmonary vasculature is normal. Negative for osseous abnormalities. Stable LVAD, single lead left subclavian ICD device and right jugular vascular catheter.  Median sternotomy wires and CABG changes.                                       CT Head Without Contrast (Final result)  Result time 06/27/25 08:30:40      Final result by Arturo Arthur MD (06/27/25 08:30:40)                   Impression:      1.  No acute intracranial abnormalities are identified.  2.  Generalized atrophy subjectively advanced for patient age.  3.  Right parietal scalp hematoma.  4.  Bilateral focal mastoiditis.  5.  Old bilateral medial orbital wall fractures.    All CT scans at this location are performed using dose modulation techniques as appropriate to a performed exam including the following: Automated exposure control; adjustment of the mA and/or kV according to patient size (this includes techniques or standardized protocols for targeted exams where dose is matched to indication / reason for exam; i.e. extremities or head); use of iterative reconstruction technique.    Finalized on: 6/27/2025 8:30 AM By:  Arturo Arthur  [facility]# [id number]      2025-06-27 08:32:50.001     [facility]               Narrative:    EXAM: EXAM:  CT HEAD WITHOUT CONTRAST    CLINICAL HISTORY: Head trauma    COMPARISON: 03/09/2025    TECHNIQUE: Standard thin-section axial images, with reformatted sagittal and coronal images.    FINDINGS: There is no evidence of acute hemorrhage or midline shift.  There is a stable, clearly defined rounded parenchymal defect visible in the white matter of the left parietal lobe which may indicate an old lacunar infarct. The ventricles and sulci are enlarged consistent with generalized atrophy appearing to be subjectively advanced for patient age. Orbital contents appear unremarkable. There is deformity consistent with old  bilateral medial orbital wall fractures involving the ethmoid sinuses.  The paranasal sinuses are otherwise clear.  There is opacification of posterior and inferior mastoid air spaces are visible bilaterally consistent with focal mastoiditis. No skull fractures are identified.  A hematoma is visible posteriorly in the right parietal scalp consistent with reported recent trauma.                                         The EKG was ordered, reviewed, and independently interpreted by the ED provider.  Interpretation time: 07:42  Rate: 84 BPM  Rhythm: Wide QRS rhythm  Interpretation: Nonspecific intraventricular block. Abnormal EKG. No STEMI.           The Emergency Provider reviewed the vital signs and test results, which are outlined above.     ED Discussion     9:52 AM: Rechecked labs with patient - within normal limits for patient come CT head has no acute findings. Pt's wife has spoken to the dialysis clinic and will return upon discharge in order for patient to undergo dialysis upon discharge. Gave the patient and spouse all f/u and return to the ED instructions. All questions and concerns were addressed at this time. Patient and/or family/caretaker expresses understanding of information and instructions, and is comfortable with plan to discharge. Pt is stable for discharge.     I discussed with patient and/or family/caretaker that evaluation in the ED does not suggest any emergent or life threatening medical conditions requiring immediate intervention beyond what was provided in the ED, and I believe patient is safe for discharge. Regardless, an unremarkable evaluation in the ED does not preclude the development or presence of a serious or life threatening condition. As such, I instructed that the patient is to return immediately for any worsening or change in current symptoms.         Medical Decision Making  Amount and/or Complexity of Data Reviewed  Labs: ordered. Decision-making details documented in ED  Course.  Radiology: ordered. Decision-making details documented in ED Course.  ECG/medicine tests: ordered and independent interpretation performed. Decision-making details documented in ED Course.    Risk  Risk Details: Differential diagnosis: Electrolyte abnormality, strains, sprain, fracture, syncope, sepsis, infection, arrhythmia, or dehydration.                  ED Medication(s):  Medications - No data to display    New Prescriptions    No medications on file        Follow-up Information       Vasu Kong MD. Schedule an appointment as soon as possible for a visit in 2 days.    Specialty: Internal Medicine  Contact information:  [address] [facility] [address]  [phone]                                 Scribe Attestation:   Scribe #1: I performed the above scribed service and the documentation accurately describes the services I performed. I attest to the accuracy of the note.     Attending:   Physician Attestation Statement for Scribe #1: I, Osorio Sepulveda MD, personally performed the services described in this documentation, as scribed by Rebeca Beverly, in my presence, and it is both accurate and complete.           Clinical Impression       ICD-10-CM ICD-9-CM   1. Injury of head, initial encounter  S09.90XA 959.01   2. Chest pain  R07.9 786.50   3. Fall, initial encounter  W19.XXXA E888.9       Disposition:   Disposition: Discharged  Condition: Stable         Osorio Sepulveda MD  06/27/25 1068

## 2025-06-27 NOTE — DISCHARGE INSTRUCTIONS
Return for any headache, confusion weakness nausea vomiting or for any concerns or complications at all.    Go to dialysis after discharged from the emergency room.    Return to emergency department if you develop:  - Sustained Fever >100.4 or low temperature <96.8   - Tachycardia (very high heart rate) or Pulse >90  - Hypotension (low blood pressure) a top number (systolic pressure) of <90 or a bottom number (diastolic pressure) of <40 or lower  - Hypertension (high blood pressure) a top number (systolic pressure) of >180 or a bottom number (diastolic pressure) of >120 or higher  - Severe pain not relieved with recommended pain control regimen  - Severe shortness of breath above baseline  - Severe nausea, vomiting, inability to tolerate oral feeding/hydration  - Altered mental status, abnormal interaction or behaviors   - If symptoms acutely worsen or do not improve  - Development of other worrisome symptom

## 2025-07-01 ENCOUNTER — ANTI-COAG VISIT (OUTPATIENT)
Dept: CARDIOLOGY | Facility: CLINIC | Age: 65
End: 2025-07-01
Payer: MEDICAID

## 2025-07-01 ENCOUNTER — LAB VISIT (OUTPATIENT)
Dept: LAB | Facility: HOSPITAL | Age: 65
End: 2025-07-01
Attending: INTERNAL MEDICINE
Payer: MEDICAID

## 2025-07-01 DIAGNOSIS — Z95.811 PRESENCE OF VENTRICULAR ASSIST DEVICE: ICD-10-CM

## 2025-07-01 DIAGNOSIS — Z95.811 LVAD (LEFT VENTRICULAR ASSIST DEVICE) PRESENT: Primary | ICD-10-CM

## 2025-07-01 LAB
INR PPP: 4.5 (ref 0.8–1.2)
PROTHROMBIN TIME: 45.9 SECONDS (ref 9–12.5)

## 2025-07-01 PROCEDURE — 85610 PROTHROMBIN TIME: CPT

## 2025-07-01 PROCEDURE — 36415 COLL VENOUS BLD VENIPUNCTURE: CPT

## 2025-07-01 PROCEDURE — 93793 ANTICOAG MGMT PT WARFARIN: CPT | Mod: ,,,

## 2025-07-01 NOTE — PROGRESS NOTES
"  Pt wife confirmed correct dose per calendar. No changes to diet or medications. He has been having diarrhea on and off for "weeks", it has been happening recently; taking imodium. He has a knot of head from fall on on Friday, was seen in ED and was cleared.   "

## 2025-07-01 NOTE — PROGRESS NOTES
[facility]    07/01/2025    Radha Abbott (65 y.o.) is followed by the [facility].      Assessment/Plan:    Radha Abbott presents with a supratherapeutic INR. Goal INR: 2.0-3.0    Lab Results   Component Value Date    INR 4.5 (H) 07/01/2025    INR 2.7 (H) 06/24/2025    INR 1.9 (H) 06/17/2025    PROTIME 45.9 (H) 07/01/2025    PROTIME 28.9 (H) 06/24/2025    PROTIME 20.5 (H) 06/17/2025       Assessment of patient findings per MA/LPN and chart review:   The following significant findings were found:   Patient reports having recent diarrhea that's been going for weeks on and off  Has been taking Imodium to help with symptoms    Recommendation for patient's warfarin regimen:   Due to supratherapeutic INR, patient was instructed to SKIP their next 2 warfarin doses.    Recommended repeat INR in 2 days      Joycelyn Angel PharmD, BCPS  Clinical Pharmacist - [facility]  Preferred Contact: Secure Messaging or In Basket Message

## 2025-07-03 ENCOUNTER — LAB VISIT (OUTPATIENT)
Dept: LAB | Facility: HOSPITAL | Age: 65
End: 2025-07-03
Attending: INTERNAL MEDICINE
Payer: MEDICAID

## 2025-07-03 ENCOUNTER — ANTI-COAG VISIT (OUTPATIENT)
Dept: CARDIOLOGY | Facility: CLINIC | Age: 65
End: 2025-07-03
Payer: MEDICAID

## 2025-07-03 DIAGNOSIS — Z95.811 LVAD (LEFT VENTRICULAR ASSIST DEVICE) PRESENT: Primary | ICD-10-CM

## 2025-07-03 DIAGNOSIS — Z95.811 PRESENCE OF VENTRICULAR ASSIST DEVICE: ICD-10-CM

## 2025-07-03 LAB
INR PPP: 3.7 (ref 0.8–1.2)
PROTHROMBIN TIME: 38.4 SECONDS (ref 9–12.5)

## 2025-07-03 PROCEDURE — 93793 ANTICOAG MGMT PT WARFARIN: CPT | Mod: ,,,

## 2025-07-03 PROCEDURE — 85610 PROTHROMBIN TIME: CPT

## 2025-07-03 PROCEDURE — 36415 COLL VENOUS BLD VENIPUNCTURE: CPT

## 2025-07-03 NOTE — PROGRESS NOTES
[facility]    07/03/2025    Radha Abbott (65 y.o.) is followed by the [facility].      Assessment/Plan:    Radha Abbott presents with a supratherapeutic INR. Goal INR: 2.0-3.0    Lab Results   Component Value Date    INR 3.7 (H) 07/03/2025    INR 4.5 (H) 07/01/2025    INR 2.7 (H) 06/24/2025    PROTIME 38.4 (H) 07/03/2025    PROTIME 45.9 (H) 07/01/2025    PROTIME 28.9 (H) 06/24/2025       Assessment of patient findings per MA/LPN and chart review:   The following significant findings were found:   Patient reports decreased appetite    Recommendation for patient's warfarin regimen:   Due to supratherapeutic INR, patient was instructed to SKIP their next 2 warfarin doses.    Recommended repeat INR in 4 days      Joycelyn Angel PharmD, BCPS  Clinical Pharmacist - [facility]  Preferred Contact: Secure Messaging or In Basket Message

## 2025-07-07 ENCOUNTER — ANTI-COAG VISIT (OUTPATIENT)
Dept: CARDIOLOGY | Facility: CLINIC | Age: 65
End: 2025-07-07
Payer: MEDICAID

## 2025-07-07 ENCOUNTER — LAB VISIT (OUTPATIENT)
Dept: LAB | Facility: HOSPITAL | Age: 65
End: 2025-07-07
Attending: INTERNAL MEDICINE
Payer: MEDICAID

## 2025-07-07 ENCOUNTER — DOCUMENTATION ONLY (OUTPATIENT)
Dept: TRANSPLANT | Facility: CLINIC | Age: 65
End: 2025-07-07
Payer: MEDICAID

## 2025-07-07 DIAGNOSIS — Z95.811 PRESENCE OF VENTRICULAR ASSIST DEVICE: ICD-10-CM

## 2025-07-07 DIAGNOSIS — Z95.811 LVAD (LEFT VENTRICULAR ASSIST DEVICE) PRESENT: Primary | ICD-10-CM

## 2025-07-07 LAB
INR PPP: 6
INR PPP: 6 (ref 0.8–1.2)
PROTHROMBIN TIME: 58.9 SECONDS (ref 9–12.5)

## 2025-07-07 PROCEDURE — 93793 ANTICOAG MGMT PT WARFARIN: CPT | Mod: ,,,

## 2025-07-07 PROCEDURE — 36415 COLL VENOUS BLD VENIPUNCTURE: CPT

## 2025-07-07 PROCEDURE — 85610 PROTHROMBIN TIME: CPT

## 2025-07-07 NOTE — PROGRESS NOTES
Notified by [facility] his INR is 6.0 today. Looks like he held warfarin 4 days last week because INR was 4.0 on 1 check and 3.7 the following check. He is reporting decreased appetite, swelling in feet (stockings and elevation have helped), and ongoing diarrhea.   I notified Dr. Ribeiro to see if he wants to admit pt or not. Will not admit pt at this time unless he feels bad, will send to ER.  Notified ID regarding diarrhea seeing he is being treated for C-diff.

## 2025-07-07 NOTE — PROGRESS NOTES
[facility]    2025 2:04 PM    Assessment/Plan:    Patient presents today with supratherapeutic INR.    Assessment of patient findings and chart review: reports decreased appetite, swelling, and ongoing diarrhea    Recommendation for patient's warfarin regimen: Hold dose today + large serving of greens if able to tolerate    Recommend repeat INR tomorrow. VAD team aware of INR and plan  _________________________________________________________________    [facility] (65 y.[facility] is followed by the [facility].    Anticoagulation Summary  As of 2025      INR goal:  2.0-3.0   TTR:  68.0% (1.2 y)   INR used for dosin.0 (2025)   Warfarin maintenance plan:  2.5 mg (2.5 mg x 1) every Thu; 1.25 mg (2.5 mg x 0.5) all other days   Weekly warfarin total:  10 mg   Plan last modified:  Jesenia Jensen, PharmD (2025)   Next INR check:  2025   Target end date:  --    Indications    LVAD (left ventricular assist device) present [Z95.811]                 Anticoagulation Episode Summary       INR check location:  --    Preferred lab:  --    Send INR reminders to:  [facility] COUMADIN LVAD    Comments:  [place]          Anticoagulation Care Providers       Provider Role Specialty Phone number    Sajan Hurley MD [facility] Cardiology [phone]

## 2025-07-07 NOTE — PROGRESS NOTES
Advised on dose per calendar + ED precautions and INR test date. Pt wife will try to get him to eat some sort of jani Vit[employer] food this evening. Lab scheduled, agreed to plan.

## 2025-07-08 ENCOUNTER — LAB VISIT (OUTPATIENT)
Dept: LAB | Facility: HOSPITAL | Age: 65
End: 2025-07-08
Attending: INTERNAL MEDICINE
Payer: MEDICAID

## 2025-07-08 ENCOUNTER — ANTI-COAG VISIT (OUTPATIENT)
Dept: CARDIOLOGY | Facility: CLINIC | Age: 65
End: 2025-07-08
Payer: MEDICAID

## 2025-07-08 ENCOUNTER — PATIENT MESSAGE (OUTPATIENT)
Dept: TRANSPLANT | Facility: CLINIC | Age: 65
End: 2025-07-08
Payer: MEDICAID

## 2025-07-08 DIAGNOSIS — Z95.811 PRESENCE OF VENTRICULAR ASSIST DEVICE: ICD-10-CM

## 2025-07-08 DIAGNOSIS — Z95.811 LVAD (LEFT VENTRICULAR ASSIST DEVICE) PRESENT: Primary | ICD-10-CM

## 2025-07-08 LAB
INR PPP: 3.8 (ref 0.8–1.2)
PROTHROMBIN TIME: 38.8 SECONDS (ref 9–12.5)

## 2025-07-08 PROCEDURE — 93793 ANTICOAG MGMT PT WARFARIN: CPT | Mod: ,,,

## 2025-07-08 PROCEDURE — 85610 PROTHROMBIN TIME: CPT

## 2025-07-08 PROCEDURE — 36415 COLL VENOUS BLD VENIPUNCTURE: CPT

## 2025-07-08 RX ORDER — WARFARIN 1 MG/1
1 TABLET ORAL DAILY
Qty: 30 TABLET | Refills: 5 | Status: ON HOLD | OUTPATIENT
Start: 2025-07-08 | End: 2026-07-08

## 2025-07-08 NOTE — PROGRESS NOTES
[facility]    07/08/2025 11:15 AM    Assessment/Plan:    Patient presents today with supratherapeutic INR.    Assessment of patient findings and chart review: INR still above goal but has improved from yesterday     Recommendation for patient's warfarin regimen: Hold dose today then decrease maintenance dose     Recommend repeat INR Thursday. Sent new rx for 1mg tablets.  _________________________________________________________________    [facility] (65 y.o.) is followed by the [facility].    Anticoagulation Summary  As of 7/8/2025      INR goal:  2.0-3.0   TTR:  67.8% (1.2 y)   INR used for dosing:  3.8 (7/8/2025)   Warfarin maintenance plan:  1 mg (1 mg x 1) every day   Weekly warfarin total:  7 mg   Plan last modified:  Jesenia Jensen, PharmD (7/8/2025)   Next INR check:  7/10/2025   Target end date:  --    Indications    LVAD (left ventricular assist device) present [Z95.811]                 Anticoagulation Episode Summary       INR check location:  --    Preferred lab:  --    Send INR reminders to:  [facility] COUMADIN LVAD    Comments:  [place]          Anticoagulation Care Providers       Provider Role Specialty Phone number    Sajan Hurley MD [facility] Cardiology [phone]

## 2025-07-09 ENCOUNTER — TELEPHONE (OUTPATIENT)
Dept: TRANSPLANT | Facility: CLINIC | Age: 65
End: 2025-07-09
Payer: MEDICAID

## 2025-07-09 DIAGNOSIS — R19.7 DIARRHEA, UNSPECIFIED TYPE: Primary | ICD-10-CM

## 2025-07-09 NOTE — TELEPHONE ENCOUNTER
Called to clarify patients medication and symptoms. Wife confirms patient has been off vancomycin for a few weeks to a month, she cannot remember the exact date. She also confirms he does have diarrhea still 1-2 times a day however she has been giving him an anti- diarrheal pill the last 2 weeks to prevent it. Communicated information to infectious disease team for evaluation.

## 2025-07-09 NOTE — TELEPHONE ENCOUNTER
Received instructions from Dr. Robles with ID, she ordered stool samples.Called and communicated to wife who will try and  the cup for the samples today to be able to drop off tomorrow. Also passed along instructions to not take anti- diarrheals since it can mask some symptoms and make diagnosing harder, she verbalized understanding and stated even with the medication he has had diarrhea twice today.communicated to VAD team.

## 2025-07-10 ENCOUNTER — ANTI-COAG VISIT (OUTPATIENT)
Dept: CARDIOLOGY | Facility: CLINIC | Age: 65
End: 2025-07-10
Payer: MEDICAID

## 2025-07-10 ENCOUNTER — LAB VISIT (OUTPATIENT)
Dept: LAB | Facility: HOSPITAL | Age: 65
End: 2025-07-10
Attending: INTERNAL MEDICINE
Payer: MEDICAID

## 2025-07-10 DIAGNOSIS — Z95.811 PRESENCE OF VENTRICULAR ASSIST DEVICE: ICD-10-CM

## 2025-07-10 DIAGNOSIS — Z95.811 LVAD (LEFT VENTRICULAR ASSIST DEVICE) PRESENT: Primary | ICD-10-CM

## 2025-07-10 DIAGNOSIS — R19.7 DIARRHEA, UNSPECIFIED TYPE: ICD-10-CM

## 2025-07-10 LAB
INR PPP: 3.3 (ref 0.8–1.2)
PROTHROMBIN TIME: 34.5 SECONDS (ref 9–12.5)

## 2025-07-10 PROCEDURE — 87324 CLOSTRIDIUM AG IA: CPT

## 2025-07-10 PROCEDURE — 87046 STOOL CULTR AEROBIC BACT EA: CPT

## 2025-07-10 PROCEDURE — 87328 CRYPTOSPORIDIUM AG IA: CPT

## 2025-07-10 PROCEDURE — 87427 SHIGA-LIKE TOXIN AG IA: CPT | Mod: 59

## 2025-07-10 PROCEDURE — 85610 PROTHROMBIN TIME: CPT

## 2025-07-10 PROCEDURE — 36415 COLL VENOUS BLD VENIPUNCTURE: CPT

## 2025-07-10 PROCEDURE — 93793 ANTICOAG MGMT PT WARFARIN: CPT | Mod: ,,,

## 2025-07-10 PROCEDURE — 87046 STOOL CULTR AEROBIC BACT EA: CPT | Mod: 59

## 2025-07-10 NOTE — PROGRESS NOTES
Arlyn reports pt took Warfarin 1mg 7/9/2025 by mistake, took correct Warfarin dose all other days, no other changes reported.

## 2025-07-10 NOTE — PROGRESS NOTES
[facility]    07/10/2025 10:00 AM    Assessment/Plan:    Patient presents today with supratherapeutic INR.    Assessment of patient findings and chart review: took 1mg yesterday instead of 0.5mg as prescribed    Recommendation for patient's warfarin regimen: Hold dose today then take 0.5mg daily until next INR    Recommend repeat INR Monday  _________________________________________________________________    [facility] (65 y.o.) is followed by the [employer] [facility].    Anticoagulation Summary  As of 7/10/2025      INR goal:  2.0-3.0   TTR:  67.5% (1.2 y)   INR used for dosing:  3.3 (7/10/2025)   Warfarin maintenance plan:  1 mg (1 mg x 1) every day   Weekly warfarin total:  7 mg   Plan last modified:  Jesenia Jensen, PharmD (7/8/2025)   Next INR check:  7/14/2025   Target end date:  --    Indications    LVAD (left ventricular assist device) present [Z95.811]                 Anticoagulation Episode Summary       INR check location:  --    Preferred lab:  --    Send INR reminders to:  [facility] COUMADIN LVAD    Comments:  [place]          Anticoagulation Care Providers       Provider Role Specialty Phone number    Sajan Hurley MD [facility] Cardiology [phone]

## 2025-07-11 ENCOUNTER — HOSPITAL ENCOUNTER (INPATIENT)
Facility: HOSPITAL | Age: 65
LOS: 3 days | Discharge: HOME | DRG: 371 | End: 2025-07-14
Attending: EMERGENCY MEDICINE | Admitting: INTERNAL MEDICINE
Payer: MEDICAID

## 2025-07-11 ENCOUNTER — TELEPHONE (OUTPATIENT)
Dept: TRANSPLANT | Facility: CLINIC | Age: 65
End: 2025-07-11
Payer: MEDICAID

## 2025-07-11 DIAGNOSIS — T82.9XXA LEFT VENTRICULAR ASSIST DEVICE (LVAD) COMPLICATION, INITIAL ENCOUNTER: ICD-10-CM

## 2025-07-11 DIAGNOSIS — R19.7 DIARRHEA OF PRESUMED INFECTIOUS ORIGIN: ICD-10-CM

## 2025-07-11 DIAGNOSIS — R63.0 POOR APPETITE: ICD-10-CM

## 2025-07-11 DIAGNOSIS — Z95.811 LVAD (LEFT VENTRICULAR ASSIST DEVICE) PRESENT: Primary | ICD-10-CM

## 2025-07-11 DIAGNOSIS — R19.7 DIARRHEA, UNSPECIFIED TYPE: ICD-10-CM

## 2025-07-11 DIAGNOSIS — I10 HYPERTENSION, UNSPECIFIED TYPE: ICD-10-CM

## 2025-07-11 DIAGNOSIS — I50.20 HFREF (HEART FAILURE WITH REDUCED EJECTION FRACTION): ICD-10-CM

## 2025-07-11 DIAGNOSIS — F43.21 ADJUSTMENT DISORDER WITH DEPRESSED MOOD: ICD-10-CM

## 2025-07-11 LAB
ABSOLUTE EOSINOPHIL (OHS): 0.03 K/UL
ABSOLUTE MONOCYTE (OHS): 0.32 K/UL (ref 0.3–1)
ABSOLUTE NEUTROPHIL COUNT (OHS): 3.59 K/UL (ref 1.8–7.7)
ALBUMIN SERPL BCP-MCNC: 2.3 G/DL (ref 3.5–5.2)
ALP SERPL-CCNC: 145 UNIT/L (ref 40–150)
ALT SERPL W/O P-5'-P-CCNC: 147 UNIT/L (ref 10–44)
ANION GAP (OHS): 7 MMOL/L (ref 8–16)
AST SERPL-CCNC: 214 UNIT/L (ref 11–45)
BASOPHILS # BLD AUTO: 0.02 K/UL
BASOPHILS NFR BLD AUTO: 0.4 %
BILIRUB SERPL-MCNC: 0.7 MG/DL (ref 0.1–1)
BNP SERPL-MCNC: 413 PG/ML (ref 0–99)
BUN SERPL-MCNC: 12 MG/DL (ref 8–23)
C COLI+JEJ+UPSA DNA STL QL NAA+NON-PROBE: NEGATIVE
C DIFF GDH STL QL: NEGATIVE
C DIFF TOX A+B STL QL IA: NEGATIVE
CALCIUM SERPL-MCNC: 7.5 MG/DL (ref 8.7–10.5)
CHLORIDE SERPL-SCNC: 106 MMOL/L (ref 95–110)
CO2 SERPL-SCNC: 23 MMOL/L (ref 23–29)
CREAT SERPL-MCNC: 3.3 MG/DL (ref 0.5–1.4)
CRYPTOSP AG SPEC QL: NEGATIVE
ERYTHROCYTE [DISTWIDTH] IN BLOOD BY AUTOMATED COUNT: 22.7 % (ref 11.5–14.5)
G LAMBLIA AG STL QL IA: NEGATIVE
GFR SERPLBLD CREATININE-BSD FMLA CKD-EPI: 20 ML/MIN/1.73/M2
GLUCOSE SERPL-MCNC: 99 MG/DL (ref 70–110)
HCT VFR BLD AUTO: 40.8 % (ref 40–54)
HGB BLD-MCNC: 12.9 GM/DL (ref 14–18)
IMM GRANULOCYTES # BLD AUTO: 0.03 K/UL (ref 0–0.04)
IMM GRANULOCYTES NFR BLD AUTO: 0.6 % (ref 0–0.5)
INFLUENZA A MOLECULAR (OHS): NEGATIVE
INFLUENZA B MOLECULAR (OHS): NEGATIVE
INR PPP: 3.4 (ref 0.8–1.2)
LDH SERPL-CCNC: 503 U/L (ref 110–260)
LYMPHOCYTES # BLD AUTO: 0.65 K/UL (ref 1–4.8)
MAGNESIUM SERPL-MCNC: 1.7 MG/DL (ref 1.6–2.6)
MCH RBC QN AUTO: 28.4 PG (ref 27–31)
MCHC RBC AUTO-ENTMCNC: 31.6 G/DL (ref 32–36)
MCV RBC AUTO: 90 FL (ref 82–98)
NUCLEATED RBC (/100WBC) (OHS): 0 /100 WBC
PHOSPHATE SERPL-MCNC: 1.7 MG/DL (ref 2.7–4.5)
PLATELET # BLD AUTO: 113 K/UL (ref 150–450)
PMV BLD AUTO: 9.6 FL (ref 9.2–12.9)
POTASSIUM SERPL-SCNC: 3.5 MMOL/L (ref 3.5–5.1)
PROT SERPL-MCNC: 6.2 GM/DL (ref 6–8.4)
PROTHROMBIN TIME: 34.1 SECONDS (ref 9–12.5)
RBC # BLD AUTO: 4.54 M/UL (ref 4.6–6.2)
RELATIVE EOSINOPHIL (OHS): 0.6 %
RELATIVE LYMPHOCYTE (OHS): 14 % (ref 18–48)
RELATIVE MONOCYTE (OHS): 6.9 % (ref 4–15)
RELATIVE NEUTROPHIL (OHS): 77.5 % (ref 38–73)
SARS-COV-2 RDRP RESP QL NAA+PROBE: NEGATIVE
SODIUM SERPL-SCNC: 136 MMOL/L (ref 136–145)
TROPONIN I SERPL HS-MCNC: 51 NG/L
WBC # BLD AUTO: 4.64 K/UL (ref 3.9–12.7)

## 2025-07-11 PROCEDURE — 80053 COMPREHEN METABOLIC PANEL: CPT | Performed by: EMERGENCY MEDICINE

## 2025-07-11 PROCEDURE — 83880 ASSAY OF NATRIURETIC PEPTIDE: CPT

## 2025-07-11 PROCEDURE — 36415 COLL VENOUS BLD VENIPUNCTURE: CPT

## 2025-07-11 PROCEDURE — 25000003 PHARM REV CODE 250: Performed by: STUDENT IN AN ORGANIZED HEALTH CARE EDUCATION/TRAINING PROGRAM

## 2025-07-11 PROCEDURE — 93010 ELECTROCARDIOGRAM REPORT: CPT | Mod: ,,, | Performed by: INTERNAL MEDICINE

## 2025-07-11 PROCEDURE — 83615 LACTATE (LD) (LDH) ENZYME: CPT | Performed by: EMERGENCY MEDICINE

## 2025-07-11 PROCEDURE — 27000248 HC VAD-ADDITIONAL DAY

## 2025-07-11 PROCEDURE — 99285 EMERGENCY DEPT VISIT HI MDM: CPT | Mod: 25

## 2025-07-11 PROCEDURE — 83605 ASSAY OF LACTIC ACID: CPT

## 2025-07-11 PROCEDURE — 96365 THER/PROPH/DIAG IV INF INIT: CPT

## 2025-07-11 PROCEDURE — 63600175 PHARM REV CODE 636 W HCPCS

## 2025-07-11 PROCEDURE — 87502 INFLUENZA DNA AMP PROBE: CPT

## 2025-07-11 PROCEDURE — 83605 ASSAY OF LACTIC ACID: CPT | Performed by: STUDENT IN AN ORGANIZED HEALTH CARE EDUCATION/TRAINING PROGRAM

## 2025-07-11 PROCEDURE — 87040 BLOOD CULTURE FOR BACTERIA: CPT | Performed by: STUDENT IN AN ORGANIZED HEALTH CARE EDUCATION/TRAINING PROGRAM

## 2025-07-11 PROCEDURE — 85610 PROTHROMBIN TIME: CPT | Performed by: EMERGENCY MEDICINE

## 2025-07-11 PROCEDURE — 84100 ASSAY OF PHOSPHORUS: CPT | Performed by: EMERGENCY MEDICINE

## 2025-07-11 PROCEDURE — 84484 ASSAY OF TROPONIN QUANT: CPT

## 2025-07-11 PROCEDURE — U0002 COVID-19 LAB TEST NON-CDC: HCPCS

## 2025-07-11 PROCEDURE — 11000001 HC ACUTE MED/SURG PRIVATE ROOM

## 2025-07-11 PROCEDURE — 85025 COMPLETE CBC W/AUTO DIFF WBC: CPT | Performed by: EMERGENCY MEDICINE

## 2025-07-11 PROCEDURE — 83735 ASSAY OF MAGNESIUM: CPT | Performed by: EMERGENCY MEDICINE

## 2025-07-11 PROCEDURE — 93005 ELECTROCARDIOGRAM TRACING: CPT

## 2025-07-11 RX ORDER — AMIODARONE HYDROCHLORIDE 200 MG/1
400 TABLET ORAL DAILY
Status: DISCONTINUED | OUTPATIENT
Start: 2025-07-12 | End: 2025-07-14 | Stop reason: HOSPADM

## 2025-07-11 RX ORDER — ATORVASTATIN CALCIUM 40 MG/1
40 TABLET, FILM COATED ORAL NIGHTLY
Status: DISCONTINUED | OUTPATIENT
Start: 2025-07-11 | End: 2025-07-11

## 2025-07-11 RX ORDER — WARFARIN 1 MG/1
1 TABLET ORAL DAILY
Status: DISCONTINUED | OUTPATIENT
Start: 2025-07-12 | End: 2025-07-12

## 2025-07-11 RX ORDER — SODIUM,POTASSIUM PHOSPHATES 280-250MG
1 POWDER IN PACKET (EA) ORAL
Status: DISCONTINUED | OUTPATIENT
Start: 2025-07-11 | End: 2025-07-14 | Stop reason: HOSPADM

## 2025-07-11 RX ORDER — HYDRALAZINE HYDROCHLORIDE 50 MG/1
50 TABLET, FILM COATED ORAL EVERY 8 HOURS
Status: DISCONTINUED | OUTPATIENT
Start: 2025-07-11 | End: 2025-07-12

## 2025-07-11 RX ORDER — VENLAFAXINE 37.5 MG/1
37.5 TABLET ORAL DAILY
Status: DISCONTINUED | OUTPATIENT
Start: 2025-07-12 | End: 2025-07-14 | Stop reason: HOSPADM

## 2025-07-11 RX ADMIN — POTASSIUM & SODIUM PHOSPHATES POWDER PACK 280-160-250 MG 1 PACKET: 280-160-250 PACK at 10:07

## 2025-07-11 RX ADMIN — SODIUM CHLORIDE, POTASSIUM CHLORIDE, SODIUM LACTATE AND CALCIUM CHLORIDE 250 ML: 600; 310; 30; 20 INJECTION, SOLUTION INTRAVENOUS at 04:07

## 2025-07-11 RX ADMIN — HYDRALAZINE HYDROCHLORIDE 50 MG: 50 TABLET ORAL at 10:07

## 2025-07-11 NOTE — ED PROVIDER NOTES
Encounter Date: 7/11/2025       History     Chief Complaint   Patient presents with    LVAD     Arrived to ED with complaint of weakness x 2 days. Pt is LVAD pt and states low flow alarms have been going off.     Diarrhea     HPI  Radha Abbott is a 65 y.o. male, PMH CAD, type 2 diabetes, paroxysmal AFib, combined heart failure, status post LVAD, ESRD on Monday//Wednesday/Friday HD that he gets through a port.  presenting with complaints of low flow alarms going off x6 today.  Patient reports recent weakness, diarrhea, decreased p.o. intake with associated poor appetite.  He denies any nausea, vomiting.  He was recently tested negative for C diff but has had this infection in the past and was recently on oral vancomycin for this.  He denies any sick symptoms with sore throat, cough, fevers, chills but does endorse rhinorrhea  Review of patient's allergies indicates:  No Known Allergies  Past Medical History:   Diagnosis Date    Aspiration pneumonia of both lungs 06/17/2024    CAD (coronary artery disease)     CHF (congestive heart failure)     Delirium 06/16/2024    Diabetes mellitus     HFrEF (heart failure with reduced ejection fraction)     Hypoglycemia 06/12/2024    ICD (implantable cardioverter-defibrillator) in place     LVAD (left ventricular assist device) present 12/01/2023    MI, old     PAF (paroxysmal atrial fibrillation) 10/11/2023    Stage 4 chronic kidney disease 02/15/2024    Type 2 diabetes mellitus without complication, without long-term current use of insulin 10/07/2023     Past Surgical History:   Procedure Laterality Date    ANGIOPLASTY-VENOUS ARTERY Right 12/1/2023    Procedure: ANGIOPLASTY-VENOUS ARTERY, RIGHT FEMORAL;  Surgeon: Yuri Washington MD;  Location: [facility] OR [address] [place] [address];  Service: Cardiovascular;  Laterality: Right;    AORTIC VALVULOPLASTY N/A 12/1/2023    Procedure: REPAIR, AORTIC VALVE;  Surgeon: Yuri Washington MD;  Location: [facility] OR [address] [place] [address];  Service: Cardiovascular;  Laterality: N/A;     CARDIAC SURGERY      CLOSURE N/A 12/1/2023    Procedure: CLOSURE, TEMPORARY;  Surgeon: Yuri Washington MD;  Location: [facility] OR 2ND FLR;  Service: Cardiovascular;  Laterality: N/A;    DRAINAGE OF PLEURAL EFFUSION  12/4/2023    Procedure: DRAINAGE, PLEURAL EFFUSION;  Surgeon: Yuri Washington MD;  Location: [facility] OR 2ND FLR;  Service: Cardiovascular;;    INSERTION OF GRAFT TO PERICARDIUM  12/4/2023    Procedure: INSERTION, GRAFT, PERICARDIUM;  Surgeon: Yuri Washington MD;  Location: [facility] OR 2ND FLR;  Service: Cardiovascular;;    INSERTION OF INTRA-AORTIC BALLOON ASSIST DEVICE Right 11/21/2023    Procedure: INSERTION, INTRA-AORTIC BALLOON PUMP;  Surgeon: Finn Cohn MD;  Location: [facility] CATH LAB;  Service: Cardiology;  Laterality: Right;    LEFT VENTRICULAR ASSIST DEVICE Left 12/1/2023    Procedure: INSERTION-LEFT VENTRICULAR ASSIST DEVICE;  Surgeon: Yuri Washington MD;  [facility];  Service: Cardiovascular;  Laterality: Left;  REDO STERNOTOMY - REDO SAW NEEDED FOR CASE    LYSIS OF ADHESIONS  12/1/2023    Procedure: LYSIS, ADHESIONS;  Surgeon: Yuri Washington MD;  [facility];  Service: Cardiovascular;;    PLACEMENT OF SWAN ROLANDO CATHETER WITH IMAGING GUIDANCE  11/20/2023    Procedure: INSERTION, CATHETER, SWAN-ROLANDO, WITH IMAGING GUIDANCE;  Surgeon: Sajan Hurley MD;  Location: [facility] CATH LAB;  Service: Cardiology;;    REMOVAL OF TUNNELED CENTRAL VENOUS CATHETER (CVC) N/A 3/1/2024    Procedure: REMOVAL, CATHETER, CENTRAL VENOUS, TUNNELED;  Surgeon: Seble Aguilar MD;  Location: [facility] CATH LAB;  Service: Interventional Nephrology;  Laterality: N/A;    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 12/1/2023    Procedure: REPAIR, ANEURYSM, ARTERY, FEMORAL;  Surgeon: Yuri Washington MD;  Location: [facility] OR 2ND FLR;  Service: Cardiovascular;  Laterality: Right;  Right Femoral Artery Repair    RIGHT HEART CATHETERIZATION Right 10/10/2023    Procedure: INSERTION, CATHETER, RIGHT HEART;   Surgeon: Bin Gandhi MD;  Location: [facility] CATH LAB;  Service: Cardiology;  Laterality: Right;    RIGHT HEART CATHETERIZATION Right 10/13/2023    Procedure: INSERTION, CATHETER, RIGHT HEART;  Surgeon: Walter Mcintyre MD;  Location: [facility] CATH LAB;  Service: Cardiology;  Laterality: Right;    RIGHT HEART CATHETERIZATION  11/13/2023    RIGHT HEART CATHETERIZATION Right 11/13/2023    Procedure: INSERTION, CATHETER, RIGHT HEART;  Surgeon: Juventino Bermudez Jr., MD;  Location: [facility] CATH LAB;  Service: Cardiology;  Laterality: Right;    RIGHT HEART CATHETERIZATION Right 11/20/2023    Procedure: INSERTION, CATHETER, RIGHT HEART;  Surgeon: Sajan Hurley MD;  Location: [facility] CATH LAB;  Service: Cardiology;  Laterality: Right;    RIGHT HEART CATHETERIZATION Right 1/22/2024    Procedure: INSERTION, CATHETER, RIGHT HEART;  Surgeon: Brayan Ocampo MD;  Location: [facility] CATH LAB;  Service: Cardiology;  Laterality: Right;    STERNAL WOUND CLOSURE N/A 12/4/2023    Procedure: CLOSURE, WOUND, STERNUM;  Surgeon: Yuri Washington MD;  [facility];  Service: Cardiovascular;  Laterality: N/A;    STERNOTOMY N/A 12/1/2023    Procedure: STERNOTOMY, REDO;  Surgeon: Yuri Washington MD;  [facility];  Service: Cardiovascular;  Laterality: N/A;    VALVULOPLASTY, MITRAL VALVE N/A 12/1/2023    Procedure: VALVULOPLASTY, MITRAL VALVE;  Surgeon: Yuri Washington MD;  [facility];  Service: Cardiovascular;  Laterality: N/A;     No family history on file.  Social History[1]  Review of Systems    Physical Exam     Initial Vitals   BP Pulse Resp Temp SpO2   07/11/25 1506 07/11/25 1506 07/11/25 1506 07/11/25 1506 07/11/25 1900   99/77 80 16 98.2 °F (36.8 °C) (!) 79 %      MAP       --                Physical Exam    Nursing note and vitals reviewed.  Constitutional: He appears well-developed and well-nourished. He is not diaphoretic. No distress.   Cardiovascular:            LVAD hum on exam    Pulmonary/Chest: No accessory muscle usage. No respiratory distress. He has no decreased breath sounds. He has no wheezes.   Abdominal: He exhibits no shifting dullness, no distension and no fluid wave. There is no abdominal tenderness. There is no rebound and no guarding.   Musculoskeletal:      Comments: Pitting edema to the bilateral LE 1+     Neurological: He is alert.   Skin: Skin is warm and dry. Capillary refill takes less than 2 seconds.         ED Course   Procedures  Labs Reviewed   COMPREHENSIVE METABOLIC PANEL - Abnormal       Result Value    Sodium 136      Potassium 3.5      Chloride 106      CO2 23      Glucose 99      BUN 12      Creatinine 3.3 (*)     Calcium 7.5 (*)     Protein Total 6.2      Albumin 2.3 (*)     Bilirubin Total 0.7             (*)      (*)     Anion Gap 7 (*)     eGFR 20 (*)    PROTIME-INR - Abnormal    PT 34.1 (*)     INR 3.4 (*)    PHOSPHORUS - Abnormal    Phosphorus Level 1.7 (*)    LACTATE DEHYDROGENASE - Abnormal    Lactate Dehydrogenase 503 (*)     Narrative:     Results are increased in hemolyzed samples.    CBC WITH DIFFERENTIAL - Abnormal    WBC 4.64      RBC 4.54 (*)     HGB 12.9 (*)     HCT 40.8      MCV 90      MCH 28.4      MCHC 31.6 (*)     RDW 22.7 (*)     Platelet Count 113 (*)     MPV 9.6      Nucleated RBC 0      Neut % 77.5 (*)     Lymph % 14.0 (*)     Mono % 6.9      Eos % 0.6      Basophil % 0.4      Imm Grans % 0.6 (*)     Neut # 3.59      Lymph # 0.65 (*)     Mono # 0.32      Eos # 0.03      Baso # 0.02      Imm Grans # 0.03     B-TYPE NATRIURETIC PEPTIDE - Abnormal     (*)    TROPONIN I HIGH SENSITIVITY - Abnormal    Troponin High Sensitive 51 (*)    INFLUENZA A & B BY MOLECULAR - Normal    INFLUENZA A MOLECULAR Negative      INFLUENZA B MOLECULAR  Negative     MAGNESIUM - Normal    Magnesium  1.7     SARS-COV-2 RNA AMPLIFICATION, QUAL - Normal    SARS COV-2 Molecular Negative     CULTURE, BLOOD   CULTURE, BLOOD   CULTURE,  RESPIRATORY   CLOSTRIDIOIDES DIFFICILE   CBC W/ AUTO DIFFERENTIAL    Narrative:     The following orders were created for panel order CBC auto differential.  Procedure                               Abnormality         Status                     ---------                               -----------         ------                     CBC with Differential[[id number]]       Abnormal            Final result                 Please view results for these tests on the individual orders.   URINALYSIS, REFLEX TO URINE CULTURE   LACTIC ACID, PLASMA     EKG Readings: (Independently Interpreted)   LVAD artifact. HR 79. Qtc 488. No ST elevations/depressions that would be concerning for STEMI.        Imaging Results              X-Ray Chest AP Portable (Final result)  Result time 07/11/25 19:29:55      Final result by Eliu Garg MD (07/11/25 19:29:55)                   Impression:      Small left pleural effusion, grossly similar to 6/27/25 chest radiograph.  Stable other findings in the body of the report.      Electronically signed by: Eliu Garg MD  Date:    07/11/2025  Time:    19:29               Narrative:    EXAMINATION:  XR CHEST AP PORTABLE    CLINICAL HISTORY:  LVAD pt. r/o lung pathlogy;    TECHNIQUE:  Single frontal view of the chest was performed.    COMPARISON:  Chest radiograph 06/27/2025    FINDINGS:  Patient is somewhat rotated.  Monitoring leads and tubing overlie the chest.    Sternotomy wires, mediastinal clips, left chest single lead cardiac device, right IJ CVC and LVAD appear grossly similar to prior.    Cardiomediastinal silhouette is midline and stable without evidence of failure.  Hilar contours are within normal limits.    Small left pleural effusion similar to prior.  Right hemithorax and left upper lung zone are otherwise well expanded and clear elsewhere.  No sizable pleural effusion on the right.  No pneumothorax.    No acute osseous process seen.  PA and lateral views can be obtained.                                        Medications   amiodarone tablet 400 mg (has no administration in time range)   hydrALAZINE tablet 50 mg (50 mg Oral Given 7/11/25 2203)   potassium, sodium phosphates 280-160-250 mg packet 1 packet (1 packet Oral Given 7/11/25 2203)   venlafaxine tablet 37.5 mg (has no administration in time range)   warfarin (COUMADIN) tablet 1 mg (has no administration in time range)   vancomycin 125 mg/5 mL oral solution 125 mg (has no administration in time range)   lactated ringers bolus 250 mL (0 mLs Intravenous Stopped 7/11/25 1755)     Medical Decision Making  Radha Abbott  is a 65 y.o. male, presenting with complaints of losses associated with diarrhea and HD session, history of present illness obtained from pt and family at bedside as seen above. Patient able to provide adequate and detailed history of present illness. Patient found to be well appearing and in no acute distress , stable. Exam notable as above.     DDX includes but is not limited to: Hypovolemia, Fluid overload, C diff, viral syndrome, malnutrition  Pt with low flow alerts X6 today prior to arrival. He reports poor appetite and low PO intake for several days. Pt did get HD today with 1.5L off. Overall clinical picture concerning for hypovolemia. Pt given 250 mL bolus. Elected trop and BPN today. He has 1+ pitting edema on PE but denies any swelling of the legs from his baseline. LDH is 500s today. INR is supra-therapeutic on warfarin.  Is negative C diff testing from yesterday.  He reports ongoing diarrhea since arrival in this ED.    See ED course for additional discussion. I discussed this case with Cardiology who agreed to evaluate and provide recommendations for this patient.     Data Reviewed/Counseling: I have reviewed the patient's vital signs, nursing notes, and other relevant tests/information. Any incidental findings were discussed with the patient. I had a detailed discussion with the patient regarding the historical  points, exam findings, and any diagnostic results supporting the diagnosis. Discussed case with HTS, who agreed to evaluate and admit patient.    Disposition: Admitted    Amount and/or Complexity of Data Reviewed  Labs: ordered. Decision-making details documented in ED Course.    Risk  Decision regarding hospitalization.               ED Course as of 07/11/25 2238 Fri Jul 11, 2025   1629 BP: 99/77 [HB]   1630 Pulse: 80 [HB]   1638 1.5L off at HD today [HB]   1704 BP(!): 102/0 [HB]   1710 Cards no answer [HB]   1722 Attempted cards X2 with no answer [HB]   1750 Cards agrees to eval via chat [HB]   1803 WBC: 4.64 [HB]   1804 Hemoglobin(!): 12.9 [HB]   1804 Hematocrit: 40.8  Stable anemia [HB]   1804 Platelet Count(!): 113  Decreased from prior [HB]   1804 Gran # (ANC): 3.59 [HB]   1804 Troponin I High Sensitivity(!): 51 [HB]   1804 BNP(!): 413 [HB]   1804 PT(!): 34.1 [HB]   1804 INR(!): 3.4 [HB]   1904 Lactate Dehydrogenase(!): 503 [HB]      ED Course User Index  [HB] Kelly Posadas MD                               Clinical Impression:  Final diagnoses:  [Z95.811] LVAD (left ventricular assist device) present (Primary)  [R63.0] Poor appetite  [R19.7] Diarrhea, unspecified type          ED Disposition Condition    Admit                       [1]   Social History  Tobacco Use    Smoking status: Former     Current packs/day: 0.50     Types: Cigarettes   Vaping Use    Vaping status: Never Used   Substance Use Topics    Alcohol use: Yes     Comment: rarely    Drug use: No        Kelly Posadas MD  Resident  07/11/25 2238

## 2025-07-11 NOTE — ED TRIAGE NOTES
Pt reports LVAD low flow alarm x 6 episodes today.  Pt has had diarrhea for awhile-last c-diff sample yesterday was negative.  Pt reports no appetite and poor intake.    Pt went to dialysis today.

## 2025-07-11 NOTE — TELEPHONE ENCOUNTER
Arlyn called to report pt had 3 LFAs since finishing HD today. She doesn't know how much they removed. He is still feeling weak.  Asked her to bring him to local ER to be evaluated.  Arlyn verbalized understanding and agreement.   On call MD and VC notified.    New Clinic Note    Marielle Chacon is a 61 y.o. female     CC:   Chief Complaint   Patient presents with    Annual Exam     Patient stated she is here for annual health evaluation, renewal of prescriptions sent into [facility], fasting for labs and wants annual flu vaccine.     Gastroesophageal Reflux    Hyperlipidemia     Patient complains of possible yeast infection and wants to discuss getting Diflucan prn. Stated she has never been tested for Hepatitis C nor HIV.    Medication Refill    Flu Vaccine        Subjective    History of Present Illness HPI   Patient is here for evaluation of chronic medical problems. She needs refills. Patient complains of a yeast infection.     Current Outpatient Medications:     ascorbic acid, vitamin C, (VITAMIN C) 500 MG tablet, Take 500 mg by mouth once daily., Disp: , Rfl:     biotin 5,000 mcg TbDL, Take by mouth., Disp: , Rfl:     coenzyme Q10 100 mg capsule, Take 100 mg by mouth once daily., Disp: , Rfl:     estradioL (ESTRACE) 0.01 % (0.1 mg/gram) vaginal cream, Place 1 g vaginally once daily. Place one half applicator full vaginally at night x 7 days, then one half applicator full vaginally at night twice weekly., Disp: 42.5 g, Rfl: 3    hydrocortisone (ANUSOL-HC) 25 mg suppository, Place 1 suppository (25 mg total) rectally 2 (two) times daily as needed for Hemorrhoids., Disp: 12 suppository, Rfl: 1    VIT D3-FOLIC ACID-B2-B6-B12 ORAL, Take by mouth., Disp: , Rfl:     fluconazole (DIFLUCAN) 150 MG Tab, Take 1 tablet (150 mg total) by mouth once daily. for 1 day, Disp: 1 tablet, Rfl: 1    omeprazole (PRILOSEC) 40 MG capsule, Take 1 capsule (40 mg total) by mouth once daily., Disp: 90 capsule, Rfl: 1    simvastatin (ZOCOR) 40 MG tablet, Take 1 tablet (40 mg total) by mouth once daily., Disp: 90 tablet, Rfl: 1     Past Medical History:   Diagnosis Date    Dyslipidemia     GERD (gastroesophageal reflux disease)     Hyperlipidemia     Lumbar radiculopathy   "       Family History   Problem Relation Age of Onset    Cancer Mother     Hypertension Mother     Alzheimer's disease Father     Breast cancer Sister         Past Surgical History:   Procedure Laterality Date    ANTERIOR VAGINAL REPAIR      DILATION AND CURETTAGE OF UTERUS  08/13/2008    HYSTERECTOMY  08/13/2008    D&C W/FROZEN SECTION,LAVH,BSO,TOT,CYSTO,ANTERIOR REPAIR    OOPHORECTOMY      Trans Obturator Tape, Cystoscopy      VAGINAL DELIVERY          Review of Systems   Constitutional: Negative for fatigue and fever.   HENT: Negative for ear pain, postnasal drip, rhinorrhea and sinus pressure/congestion.    Respiratory: Negative for cough and shortness of breath.    Cardiovascular: Negative for chest pain.   Gastrointestinal: Negative for abdominal pain, diarrhea, nausea and vomiting.   Genitourinary: Negative for dysuria.   Neurological: Negative for headaches.        /85 (BP Location: Left arm, Patient Position: Sitting, BP Method: Medium (Automatic))   Pulse 80   Temp 98.6 °F (37 °C) (Oral)   Resp 18   Ht 5' 4" (1.626 m)   Wt 66.7 kg (147 lb)   SpO2 99%   BMI 25.23 kg/m²      Physical Exam  HENT:      Head: Normocephalic and atraumatic.   Cardiovascular:      Rate and Rhythm: Normal rate and regular rhythm.   Pulmonary:      Effort: Pulmonary effort is normal.      Breath sounds: Normal breath sounds.   Neurological:      Mental Status: She is alert and oriented to person, place, and time.   Psychiatric:         Mood and Affect: Mood normal.         Behavior: Behavior normal.          Assessment and Plan      ICD-10-CM ICD-9-CM   1. Mixed hyperlipidemia  E78.2 272.2   2. Gastroesophageal reflux disease, unspecified whether esophagitis present  K21.9 530.81   3. Vaginal yeast infection  B37.3 112.1        Problem List Items Addressed This Visit        Cardiac/Vascular    Mixed hyperlipidemia - Primary    Relevant Medications    simvastatin (ZOCOR) 40 MG tablet    Other Relevant Orders    " Comprehensive Metabolic Panel    CBC Auto Differential    Lipid Panel       GI    Gastroesophageal reflux disease    Relevant Medications    omeprazole (PRILOSEC) 40 MG capsule      Other Visit Diagnoses     Vaginal yeast infection        Relevant Medications    fluconazole (DIFLUCAN) 150 MG Tab             Follow up in about 6 months (around 7/31/2022).

## 2025-07-11 NOTE — ED NOTES
Pt had episode of diarrhea.  Perineal care provided.  Repositioned for comfort.  Wife remains at bedside.  Side rails up.  Call button within reach

## 2025-07-12 LAB
ABSOLUTE EOSINOPHIL (OHS): 0.04 K/UL
ABSOLUTE MONOCYTE (OHS): 0.21 K/UL (ref 0.3–1)
ABSOLUTE NEUTROPHIL COUNT (OHS): 3.16 K/UL (ref 1.8–7.7)
ANION GAP (OHS): 7 MMOL/L (ref 8–16)
ANION GAP (OHS): 9 MMOL/L (ref 8–16)
APTT PPP: 58.4 SECONDS (ref 21–32)
BASOPHILS # BLD AUTO: 0.01 K/UL
BASOPHILS NFR BLD AUTO: 0.3 %
BUN SERPL-MCNC: 15 MG/DL (ref 8–23)
BUN SERPL-MCNC: 16 MG/DL (ref 8–23)
CALCIUM SERPL-MCNC: 7.2 MG/DL (ref 8.7–10.5)
CALCIUM SERPL-MCNC: 7.8 MG/DL (ref 8.7–10.5)
CHLORIDE SERPL-SCNC: 104 MMOL/L (ref 95–110)
CHLORIDE SERPL-SCNC: 105 MMOL/L (ref 95–110)
CO2 SERPL-SCNC: 24 MMOL/L (ref 23–29)
CO2 SERPL-SCNC: 25 MMOL/L (ref 23–29)
CREAT SERPL-MCNC: 4 MG/DL (ref 0.5–1.4)
CREAT SERPL-MCNC: 4.1 MG/DL (ref 0.5–1.4)
E COLI SXT1 STL QL IA: NEGATIVE
E COLI SXT2 STL QL IA: NEGATIVE
EAG (OHS): 74 MG/DL (ref 68–131)
ERYTHROCYTE [DISTWIDTH] IN BLOOD BY AUTOMATED COUNT: 21 % (ref 11.5–14.5)
GFR SERPLBLD CREATININE-BSD FMLA CKD-EPI: 16 ML/MIN/1.73/M2
GFR SERPLBLD CREATININE-BSD FMLA CKD-EPI: 16 ML/MIN/1.73/M2
GLUCOSE SERPL-MCNC: 74 MG/DL (ref 70–110)
GLUCOSE SERPL-MCNC: 75 MG/DL (ref 70–110)
HBA1C MFR BLD: 4.2 % (ref 4–5.6)
HCT VFR BLD AUTO: 32.2 % (ref 40–54)
HGB BLD-MCNC: 10.3 GM/DL (ref 14–18)
IMM GRANULOCYTES # BLD AUTO: 0.03 K/UL (ref 0–0.04)
IMM GRANULOCYTES NFR BLD AUTO: 0.8 % (ref 0–0.5)
INR PPP: 3.4 (ref 0.8–1.2)
LACTATE SERPL-SCNC: 1.4 MMOL/L (ref 0.5–2.2)
LACTATE SERPL-SCNC: 1.5 MMOL/L (ref 0.5–2.2)
LDH SERPL-CCNC: 351 U/L (ref 110–260)
LYMPHOCYTES # BLD AUTO: 0.5 K/UL (ref 1–4.8)
MAGNESIUM SERPL-MCNC: 1.6 MG/DL (ref 1.6–2.6)
MCH RBC QN AUTO: 28.6 PG (ref 27–31)
MCHC RBC AUTO-ENTMCNC: 32 G/DL (ref 32–36)
MCV RBC AUTO: 89 FL (ref 82–98)
NUCLEATED RBC (/100WBC) (OHS): 0 /100 WBC
OHS QRS DURATION: 170 MS
OHS QTC CALCULATION: 488 MS
PHOSPHATE SERPL-MCNC: 1.7 MG/DL (ref 2.7–4.5)
PLATELET # BLD AUTO: 108 K/UL (ref 150–450)
PMV BLD AUTO: 10 FL (ref 9.2–12.9)
POTASSIUM SERPL-SCNC: 2.4 MMOL/L (ref 3.5–5.1)
POTASSIUM SERPL-SCNC: 2.8 MMOL/L (ref 3.5–5.1)
PROTHROMBIN TIME: 33.9 SECONDS (ref 9–12.5)
RBC # BLD AUTO: 3.6 M/UL (ref 4.6–6.2)
RELATIVE EOSINOPHIL (OHS): 1 %
RELATIVE LYMPHOCYTE (OHS): 12.7 % (ref 18–48)
RELATIVE MONOCYTE (OHS): 5.3 % (ref 4–15)
RELATIVE NEUTROPHIL (OHS): 79.9 % (ref 38–73)
SODIUM SERPL-SCNC: 136 MMOL/L (ref 136–145)
SODIUM SERPL-SCNC: 138 MMOL/L (ref 136–145)
WBC # BLD AUTO: 3.95 K/UL (ref 3.9–12.7)

## 2025-07-12 PROCEDURE — 87449 NOS EACH ORGANISM AG IA: CPT | Performed by: STUDENT IN AN ORGANIZED HEALTH CARE EDUCATION/TRAINING PROGRAM

## 2025-07-12 PROCEDURE — 80048 BASIC METABOLIC PNL TOTAL CA: CPT | Performed by: PHYSICIAN ASSISTANT

## 2025-07-12 PROCEDURE — 85610 PROTHROMBIN TIME: CPT | Performed by: STUDENT IN AN ORGANIZED HEALTH CARE EDUCATION/TRAINING PROGRAM

## 2025-07-12 PROCEDURE — 25000003 PHARM REV CODE 250

## 2025-07-12 PROCEDURE — 97530 THERAPEUTIC ACTIVITIES: CPT

## 2025-07-12 PROCEDURE — 11000001 HC ACUTE MED/SURG PRIVATE ROOM

## 2025-07-12 PROCEDURE — 36415 COLL VENOUS BLD VENIPUNCTURE: CPT | Performed by: PHYSICIAN ASSISTANT

## 2025-07-12 PROCEDURE — 27000248 HC VAD-ADDITIONAL DAY

## 2025-07-12 PROCEDURE — 84100 ASSAY OF PHOSPHORUS: CPT | Performed by: STUDENT IN AN ORGANIZED HEALTH CARE EDUCATION/TRAINING PROGRAM

## 2025-07-12 PROCEDURE — 97165 OT EVAL LOW COMPLEX 30 MIN: CPT

## 2025-07-12 PROCEDURE — 99223 1ST HOSP IP/OBS HIGH 75: CPT | Mod: ,,, | Performed by: INTERNAL MEDICINE

## 2025-07-12 PROCEDURE — 85025 COMPLETE CBC W/AUTO DIFF WBC: CPT | Performed by: STUDENT IN AN ORGANIZED HEALTH CARE EDUCATION/TRAINING PROGRAM

## 2025-07-12 PROCEDURE — 25000003 PHARM REV CODE 250: Performed by: PHYSICIAN ASSISTANT

## 2025-07-12 PROCEDURE — 85730 THROMBOPLASTIN TIME PARTIAL: CPT | Performed by: STUDENT IN AN ORGANIZED HEALTH CARE EDUCATION/TRAINING PROGRAM

## 2025-07-12 PROCEDURE — 83735 ASSAY OF MAGNESIUM: CPT | Performed by: STUDENT IN AN ORGANIZED HEALTH CARE EDUCATION/TRAINING PROGRAM

## 2025-07-12 PROCEDURE — 80048 BASIC METABOLIC PNL TOTAL CA: CPT | Performed by: STUDENT IN AN ORGANIZED HEALTH CARE EDUCATION/TRAINING PROGRAM

## 2025-07-12 PROCEDURE — 99222 1ST HOSP IP/OBS MODERATE 55: CPT | Mod: ,,, | Performed by: NURSE PRACTITIONER

## 2025-07-12 PROCEDURE — 36415 COLL VENOUS BLD VENIPUNCTURE: CPT | Performed by: STUDENT IN AN ORGANIZED HEALTH CARE EDUCATION/TRAINING PROGRAM

## 2025-07-12 PROCEDURE — 94761 N-INVAS EAR/PLS OXIMETRY MLT: CPT

## 2025-07-12 PROCEDURE — 83036 HEMOGLOBIN GLYCOSYLATED A1C: CPT | Performed by: STUDENT IN AN ORGANIZED HEALTH CARE EDUCATION/TRAINING PROGRAM

## 2025-07-12 PROCEDURE — 83615 LACTATE (LD) (LDH) ENZYME: CPT | Performed by: STUDENT IN AN ORGANIZED HEALTH CARE EDUCATION/TRAINING PROGRAM

## 2025-07-12 PROCEDURE — 25000003 PHARM REV CODE 250: Performed by: STUDENT IN AN ORGANIZED HEALTH CARE EDUCATION/TRAINING PROGRAM

## 2025-07-12 PROCEDURE — 5A1D70Z PERFORMANCE OF URINARY FILTRATION, INTERMITTENT, LESS THAN 6 HOURS PER DAY: ICD-10-PCS | Performed by: INTERNAL MEDICINE

## 2025-07-12 RX ORDER — SODIUM CHLORIDE 9 MG/ML
INJECTION, SOLUTION INTRAVENOUS ONCE
Status: CANCELLED | OUTPATIENT
Start: 2025-07-14

## 2025-07-12 RX ORDER — POTASSIUM CHLORIDE 750 MG/1
30 CAPSULE, EXTENDED RELEASE ORAL EVERY 4 HOURS
Status: COMPLETED | OUTPATIENT
Start: 2025-07-12 | End: 2025-07-12

## 2025-07-12 RX ORDER — GLUCAGON 1 MG
1 KIT INJECTION
Status: DISCONTINUED | OUTPATIENT
Start: 2025-07-12 | End: 2025-07-14 | Stop reason: HOSPADM

## 2025-07-12 RX ORDER — IBUPROFEN 200 MG
24 TABLET ORAL
Status: DISCONTINUED | OUTPATIENT
Start: 2025-07-12 | End: 2025-07-14 | Stop reason: HOSPADM

## 2025-07-12 RX ORDER — IBUPROFEN 200 MG
16 TABLET ORAL
Status: DISCONTINUED | OUTPATIENT
Start: 2025-07-12 | End: 2025-07-14 | Stop reason: HOSPADM

## 2025-07-12 RX ORDER — HYDRALAZINE HYDROCHLORIDE 20 MG/ML
10 INJECTION INTRAMUSCULAR; INTRAVENOUS ONCE
Status: DISCONTINUED | OUTPATIENT
Start: 2025-07-12 | End: 2025-07-14 | Stop reason: HOSPADM

## 2025-07-12 RX ORDER — LOPERAMIDE HYDROCHLORIDE 2 MG/1
2 CAPSULE ORAL 4 TIMES DAILY PRN
Status: DISCONTINUED | OUTPATIENT
Start: 2025-07-12 | End: 2025-07-12

## 2025-07-12 RX ORDER — INSULIN ASPART 100 [IU]/ML
0-5 INJECTION, SOLUTION INTRAVENOUS; SUBCUTANEOUS
Status: DISCONTINUED | OUTPATIENT
Start: 2025-07-12 | End: 2025-07-14 | Stop reason: HOSPADM

## 2025-07-12 RX ADMIN — POTASSIUM & SODIUM PHOSPHATES POWDER PACK 280-160-250 MG 1 PACKET: 280-160-250 PACK at 09:07

## 2025-07-12 RX ADMIN — VANCOMYCIN HYDROCHLORIDE 125 MG: KIT at 12:07

## 2025-07-12 RX ADMIN — HYDRALAZINE HYDROCHLORIDE 75 MG: 25 TABLET ORAL at 09:07

## 2025-07-12 RX ADMIN — VANCOMYCIN HYDROCHLORIDE 500 MG: KIT at 11:07

## 2025-07-12 RX ADMIN — VANCOMYCIN HYDROCHLORIDE 125 MG: KIT at 05:07

## 2025-07-12 RX ADMIN — AMIODARONE HYDROCHLORIDE 400 MG: 200 TABLET ORAL at 09:07

## 2025-07-12 RX ADMIN — LOPERAMIDE HYDROCHLORIDE 2 MG: 2 CAPSULE ORAL at 09:07

## 2025-07-12 RX ADMIN — VANCOMYCIN HYDROCHLORIDE 125 MG: KIT at 11:07

## 2025-07-12 RX ADMIN — POTASSIUM CHLORIDE 30 MEQ: 750 CAPSULE, EXTENDED RELEASE ORAL at 02:07

## 2025-07-12 RX ADMIN — POTASSIUM CHLORIDE 30 MEQ: 750 CAPSULE, EXTENDED RELEASE ORAL at 05:07

## 2025-07-12 RX ADMIN — POTASSIUM & SODIUM PHOSPHATES POWDER PACK 280-160-250 MG 1 PACKET: 280-160-250 PACK at 05:07

## 2025-07-12 RX ADMIN — HYDRALAZINE HYDROCHLORIDE 50 MG: 50 TABLET ORAL at 02:07

## 2025-07-12 RX ADMIN — HYDRALAZINE HYDROCHLORIDE 50 MG: 50 TABLET ORAL at 05:07

## 2025-07-12 RX ADMIN — VENLAFAXINE 37.5 MG: 37.5 TABLET ORAL at 09:07

## 2025-07-12 RX ADMIN — POTASSIUM & SODIUM PHOSPHATES POWDER PACK 280-160-250 MG 1 PACKET: 280-160-250 PACK at 10:07

## 2025-07-12 NOTE — ED NOTES
Assumed care of patient at this time. Pt arrive to ED with a complaint of weakness and low flow alarms from LVAD.Pt placed in hospital gown and currently lying in stretcher. , provided pt with warm blanket. Pt denied restroom use. No other complaints from pt at this time.     Review of patient's allergies indicates:  No Known Allergies  Past Medical History:   Diagnosis Date    Aspiration pneumonia of both lungs 06/17/2024    CAD (coronary artery disease)     CHF (congestive heart failure)     Delirium 06/16/2024    Diabetes mellitus     HFrEF (heart failure with reduced ejection fraction)     Hypoglycemia 06/12/2024    ICD (implantable cardioverter-defibrillator) in place     LVAD (left ventricular assist device) present 12/01/2023    MI, old     PAF (paroxysmal atrial fibrillation) 10/11/2023    Stage 4 chronic kidney disease 02/15/2024    Type 2 diabetes mellitus without complication, without long-term current use of insulin 10/07/2023

## 2025-07-12 NOTE — ASSESSMENT & PLAN NOTE
-ICM s/p HM 3  -Last 2D Echo 4/3/25: LVEF 10-15%, LVEDD 5cm with speed at 5000  -Euvolemic on examination today  -Current diuretic regimen: N/A on HD MWF  -GDMT with N/A  -2g Na dietary restriction, 2000 mL fluid restriction, strict I/Os

## 2025-07-12 NOTE — PLAN OF CARE
Recommendations  --Continue Renal on Dialysis Heart Healthy Fluid - 1500 mL diet as tolerated and clinically indicated   --Order Novasource Renal if PO intake <50% EEN  --Encourage good intakes   --Nursing: please continue to document % meal eaten on flowsheet   --RD to monitor weight, PO intake     Goal #1: PO intake will meet greater than or equal to 75% of estimated needs by RD follow up  Nutrition Goal Status #1: new  Goal #2: Maintain weight throughout hospitalization  Nutrition Goal Status #2: new  Communication of RD Recs:  (POC)

## 2025-07-12 NOTE — ASSESSMENT & PLAN NOTE
Patient has paroxysmal (<7 days) atrial fibrillation. [id number] Score: 2. The patients heart rate in the last 24 hours is as follows:  Pulse  Min: 17  Max: 92     Antiarrhythmics  amiodarone tablet 400 mg, Daily, Oral    Anticoagulants  warfarin (COUMADIN) tablet 1 mg, Daily, Oral    Plan  - Replete lytes with a goal of K>4, Mg >2

## 2025-07-12 NOTE — NURSING
Nurses Note -- 4 Eyes      7/11/2025   10:51 PM      Skin assessed during: Admit      [x] No Altered Skin Integrity Present    []Prevention Measures Documented      [] Yes- Altered Skin Integrity Present or Discovered   [] LDA Added if Not in Epic (Describe Wound)   [] New Altered Skin Integrity was Present on Admit and Documented in LDA   [] Wound Image Taken    Wound Care Consulted? No    Attending Nurse:  Jerson Key RN     Second RN/Staff Member:  KYAW Bryant

## 2025-07-12 NOTE — HPI
65 year old male with a history stage D HFrEF 2/2 ICM s/p HM3 as DT (12/1/2023), ESRD on HD (MWF), and clostridium difficile colitis for which was received treatment followed by a slow taper on oral vancomycin.  Shortly after completing his oral vancomycin taper, the patient developed diarrhea again.  Stool studies obtained on 7/10 were negative for C diff.  Patient presented for admission for evaluation of his ongoing diarrhea.

## 2025-07-12 NOTE — NURSING
Patient admitted to unit. Patient arrived to floor from ED no evidence of distress; patient AAO x4 at this time. Patient placed on tele. Vital signs obtained. Patient voices no complaints at this time. Plan of care initiated with patient and wife. Bed in lowest position, locked, SR up x2, call bell in reach.

## 2025-07-12 NOTE — SUBJECTIVE & OBJECTIVE
Interval History: HM 3 with recent hx of C-diff admitted with persistent diarrhea and LFA.  Patient hypertensive this am so will add one dose of Hydralazine and monitor LFA.  ID and GI consulted on admit.  Repeat C-diff studies ordered and pending.     Continuous Infusions:  Scheduled Meds:   amiodarone  400 mg Oral Daily    hydrALAZINE  50 mg Oral Q8H    potassium, sodium phosphates  1 packet Oral QID (AC & HS)    vancomycin  125 mg Oral Q6H    venlafaxine  37.5 mg Oral Daily     PRN Meds:  Current Facility-Administered Medications:     dextrose 50%, 12.5 g, Intravenous, PRN    dextrose 50%, 25 g, Intravenous, PRN    glucagon (human recombinant), 1 mg, Intramuscular, PRN    glucose, 16 g, Oral, PRN    glucose, 24 g, Oral, PRN    insulin aspart U-100, 0-5 Units, Subcutaneous, QID (AC + HS) PRN    Review of patient's allergies indicates:  No Known Allergies  Objective:     Vital Signs (Most Recent):  Temp: 99.4 °F (37.4 °C) (07/12/25 0752)  Pulse: 75 (07/12/25 0800)  Resp: 20 (07/12/25 0752)  BP: (!) 100/0 (07/12/25 0755)  SpO2: 100 % (07/12/25 0752) Vital Signs (24h Range):  Temp:  [98.2 °F (36.8 °C)-99.4 °F (37.4 °C)] 99.4 °F (37.4 °C)  Pulse:  [17-92] 75  Resp:  [12-20] 20  SpO2:  [79 %-100 %] 100 %  BP: ()/(0-95) 100/0     Patient Vitals for the past 72 hrs (Last 3 readings):   Weight   07/11/25 1623 75.8 kg (167 lb 1.7 oz)   07/11/25 1506 75.8 kg (167 lb)     Body mass index is 22.05 kg/m².      Intake/Output Summary (Last 24 hours) at 7/12/2025 1014  Last data filed at 7/12/2025 0243  Gross per 24 hour   Intake 118 ml   Output --   Net 118 ml       Hemodynamic Parameters:       Telemetry: reviewed     Physical Exam  Vitals and nursing note reviewed.   Constitutional:       Appearance: Normal appearance.   HENT:      Head: Normocephalic.      Nose: Nose normal.      Mouth/Throat:      Mouth: Mucous membranes are moist.   Eyes:      Pupils: Pupils are equal, round, and reactive to light.   Neck:       Comments: No JVP elevation   Cardiovascular:      Comments: Smooth VAD hum  Pulmonary:      Effort: Pulmonary effort is normal.      Breath sounds: Normal breath sounds.   Abdominal:      General: Bowel sounds are normal. There is no distension.      Palpations: Abdomen is soft.   Musculoskeletal:         General: Normal range of motion.      Right lower leg: No edema.      Left lower leg: No edema.   Skin:     General: Skin is warm.      Capillary Refill: Capillary refill takes 2 to 3 seconds.   Neurological:      General: No focal deficit present.      Mental Status: He is alert and oriented to person, place, and time.   Psychiatric:         Mood and Affect: Mood normal.         Behavior: Behavior normal.            Significant Labs:  CBC:  Recent Labs   Lab 07/11/25  1634 07/12/25  0505   WBC 4.64 3.95   RBC 4.54* 3.60*   HGB 12.9* 10.3*   HCT 40.8 32.2*   * 108*   MCV 90 89   MCH 28.4 28.6   MCHC 31.6* 32.0     BNP:  Recent Labs   Lab 07/11/25  1634   *     CMP:  Recent Labs   Lab 07/11/25 1812 07/12/25  0505 07/12/25  0913   GLU 99 74 75   CALCIUM 7.5* 7.2* 7.8*   ALBUMIN 2.3*  --   --    PROT 6.2  --   --     136 138   K 3.5 2.4* 2.8*   CO2 23 25 24    104 105   BUN 12 15 16   CREATININE 3.3* 4.0* 4.1*   ALKPHOS 145  --   --    *  --   --    *  --   --    BILITOT 0.7  --   --       Coagulation:   Recent Labs   Lab 07/10/25  0717 07/11/25  1634 07/12/25  0505   INR 3.3* 3.4* 3.4*   APTT  --   --  58.4*     LDH:  Recent Labs   Lab 07/11/25 1812 07/12/25  0505   * 351*     Microbiology:  Microbiology Results (last 7 days)       Procedure Component Value Units Date/Time    Clostridium difficile EIA [[id number]] Collected: 07/12/25 0246    Order Status: Sent Specimen: Stool Updated: 07/12/25 0935    Blood culture [[id number]] Collected: 07/11/25 1953    Order Status: Sent Specimen: Blood from Peripheral, Upper Arm, Right Updated: 07/11/25 3912    Blood culture  [[id number]] Collected: 07/11/25 1953    Order Status: Sent Specimen: Blood from Peripheral, Antecubital, Right Updated: 07/11/25 2220    Influenza A & B by Molecular [[id number]]  (Normal) Collected: 07/11/25 1634    Order Status: Completed Specimen: Nasal Swab Updated: 07/11/25 1841     INFLUENZA A MOLECULAR Negative     INFLUENZA B MOLECULAR  Negative    Culture, Respiratory with Gram Stain [[id number]]     Order Status: Sent Specimen: Respiratory             I have reviewed all pertinent labs within the past 24 hours.    Estimated Creatinine Clearance: 19.8 mL/min (A) (based on SCr of 4.1 mg/dL (H)).    Diagnostic Results:  I have reviewed all pertinent imaging results/findings within the past 24 hours.

## 2025-07-12 NOTE — ED NOTES
Bed: [facility]  Expected date:   Expected time:   Means of arrival:   Comments:

## 2025-07-12 NOTE — ASSESSMENT & PLAN NOTE
On Coumadin 1 mg daily at home  INR 3.4  Adjust dose accordingly  With interrogation to be performed

## 2025-07-12 NOTE — PLAN OF CARE
Problem: Ventricular Assist Device  Goal: Optimal Adjustment to Device  Outcome: Progressing  Intervention: Optimize Psychosocial Response to VAD  Flowsheets (Taken 7/12/2025 1258)  Supportive Measures:   active listening utilized   self-care encouraged  Family/Support System Care: self-care encouraged     Problem: Adult Inpatient Plan of Care  Goal: Patient-Specific Goal (Individualized)  Outcome: Progressing  Flowsheets (Taken 7/12/2025 1258)  Individualized Care Needs: updated patient on POC  Anxieties, Fears or Concerns: none stated     Problem: Diabetes Comorbidity  Goal: Blood Glucose Level Within Targeted Range  Outcome: Progressing  Intervention: Monitor and Manage Glycemia  Flowsheets (Taken 7/12/2025 1258)  Glycemic Management: blood glucose monitored     Problem: Fatigue  Goal: Improved Activity Tolerance  Outcome: Progressing  Intervention: Promote Improved Energy  Flowsheets (Taken 7/12/2025 1258)  Fatigue Management: frequent rest breaks encouraged  Sleep/Rest Enhancement:   awakenings minimized   consistent schedule promoted   family presence promoted   regular sleep/rest pattern promoted   natural light exposure provided  Activity Management: Up in chair - L3  Environmental Support:   calm environment promoted   personal routine supported   rest periods encouraged   AAOx4, Plan of care discussed with patient. Patient has no complaints of chest pain/SOB/palpitations. Pt ambulating  with assist x1, fall precautions in place,no falls/injuries through the shift.Discussed medications and care.Patient has no questions at this time.Pt resting comfortably with no acute distress.Call light within reach,bed at lowest position.

## 2025-07-12 NOTE — CONSULTS
[facility] Consult Note    Attending: No att. providers found   Admit Date: 7/11/2025  Today's Date: 07/12/2025  Reason for Consult:  FMT consideration    SUBJECTIVE:     HPI:  65 M PMHx of stage D HFrEF 2/2 ICM s/p HM3 as DT (12/1/2023), ESRD on HD (MWF), and debility here for ongoing diarrhea after treatment of c. difficile infection. GI consulted for consideration of FMT consideration.     Pertinent history below:  5/9-11: Patient was recently hospitalized for anemia and C-difficile infection. At that time patient received a unit of blood, hydrated for low flow alarms, hydralazine given to target MAPs to 80s with Hydral 50mg TID, then put on extended vancomycin course with taper for discharge on 5/11.    Upon interview, pt states his bowel movements have improved. He has had 3-4 bm/day. When he prior had c. Diff he stated he had more than 6-7 bm/day. His stools have are soft but formed. He has no abdominal fever, or chills. He just completed a vanc taper for possible c.diff in 5/9/25.     Of note, during this time his c.diff ag was postive with negative toxins. He only has had one c. Diff  positive pcr positive with ag and toxins.     Most Recent Endoscopic Procedures:     Review of patient's allergies indicates:  No Known Allergies    Past Medical History:   Diagnosis Date    Aspiration pneumonia of both lungs 06/17/2024    CAD (coronary artery disease)     CHF (congestive heart failure)     Delirium 06/16/2024    Diabetes mellitus     HFrEF (heart failure with reduced ejection fraction)     Hypoglycemia 06/12/2024    ICD (implantable cardioverter-defibrillator) in place     LVAD (left ventricular assist device) present 12/01/2023    MI, old     PAF (paroxysmal atrial fibrillation) 10/11/2023    Stage 4 chronic kidney disease 02/15/2024    Type 2 diabetes mellitus without complication, without long-term current use of insulin 10/07/2023     Past Surgical History:   Procedure Laterality Date     ANGIOPLASTY-VENOUS ARTERY Right 12/1/2023    Procedure: ANGIOPLASTY-VENOUS ARTERY, RIGHT FEMORAL;  Surgeon: Yuri Washington MD;  Location: [facility] OR [facility] FLR;  Service: Cardiovascular;  Laterality: Right;    AORTIC VALVULOPLASTY N/A 12/1/2023    Procedure: REPAIR, AORTIC VALVE;  Surgeon: Yuri Washington MD;  Location: [facility] OR [facility] FLR;  Service: Cardiovascular;  Laterality: N/A;    CARDIAC SURGERY      CLOSURE N/A 12/1/2023    Procedure: CLOSURE, TEMPORARY;  Surgeon: Yuri Washington MD;  Location: [facility] OR [facility] FLR;  Service: Cardiovascular;  Laterality: N/A;    DRAINAGE OF PLEURAL EFFUSION  12/4/2023    Procedure: DRAINAGE, PLEURAL EFFUSION;  Surgeon: Yuri Washington MD;  [facility];  Service: Cardiovascular;;    INSERTION OF GRAFT TO PERICARDIUM  12/4/2023    Procedure: INSERTION, GRAFT, PERICARDIUM;  Surgeon: Yuri Washington MD;  [facility];  Service: Cardiovascular;;    INSERTION OF INTRA-AORTIC BALLOON ASSIST DEVICE Right 11/21/2023    Procedure: INSERTION, INTRA-AORTIC BALLOON PUMP;  Surgeon: Finn Cohn MD;  Location: [facility] CATH LAB;  Service: Cardiology;  Laterality: Right;    LEFT VENTRICULAR ASSIST DEVICE Left 12/1/2023    Procedure: INSERTION-LEFT VENTRICULAR ASSIST DEVICE;  Surgeon: Yuri Washington MD;  [facility];  Service: Cardiovascular;  Laterality: Left;  REDO STERNOTOMY - REDO SAW NEEDED FOR CASE    LYSIS OF ADHESIONS  12/1/2023    Procedure: LYSIS, ADHESIONS;  Surgeon: Yuri Washington MD;  Location: [facility] OR [facility] FLR;  Service: Cardiovascular;;    PLACEMENT OF SWAN ROLANDO CATHETER WITH IMAGING GUIDANCE  11/20/2023    Procedure: INSERTION, CATHETER, SWAN-ROLANDO, WITH IMAGING GUIDANCE;  Surgeon: Sajan Hurley MD;  Location: [facility] CATH LAB;  Service: Cardiology;;    REMOVAL OF TUNNELED CENTRAL VENOUS CATHETER (CVC) N/A 3/1/2024    Procedure: REMOVAL, CATHETER, CENTRAL VENOUS, TUNNELED;  Surgeon: Seble Aguilar MD;  Location: [facility] CATH LAB;  Service:  Interventional Nephrology;  Laterality: N/A;    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 12/1/2023    Procedure: REPAIR, ANEURYSM, ARTERY, FEMORAL;  Surgeon: Yuri Washington MD;  Location: [address];  Service: Cardiovascular;  Laterality: Right;  Right Femoral Artery Repair    RIGHT HEART CATHETERIZATION Right 10/10/2023    Procedure: INSERTION, CATHETER, RIGHT HEART;  Surgeon: Bin Gandhi MD;  Location: [facility] CATH LAB;  Service: Cardiology;  Laterality: Right;    RIGHT HEART CATHETERIZATION Right 10/13/2023    Procedure: INSERTION, CATHETER, RIGHT HEART;  Surgeon: Walter Mcintyre MD;  Location: [facility] CATH LAB;  Service: Cardiology;  Laterality: Right;    RIGHT HEART CATHETERIZATION  11/13/2023    RIGHT HEART CATHETERIZATION Right 11/13/2023    Procedure: INSERTION, CATHETER, RIGHT HEART;  Surgeon: Juventino Bermudez Jr., MD;  Location: [facility] CATH LAB;  Service: Cardiology;  Laterality: Right;    RIGHT HEART CATHETERIZATION Right 11/20/2023    Procedure: INSERTION, CATHETER, RIGHT HEART;  Surgeon: Sajan Hurley MD;  Location: [facility] CATH LAB;  Service: Cardiology;  Laterality: Right;    RIGHT HEART CATHETERIZATION Right 1/22/2024    Procedure: INSERTION, CATHETER, RIGHT HEART;  Surgeon: Brayan Ocampo MD;  Location: [facility] CATH LAB;  Service: Cardiology;  Laterality: Right;    STERNAL WOUND CLOSURE N/A 12/4/2023    Procedure: CLOSURE, WOUND, STERNUM;  Surgeon: Yuri Washington MD;  Location: [address];  Service: Cardiovascular;  Laterality: N/A;    STERNOTOMY N/A 12/1/2023    Procedure: STERNOTOMY, REDO;  Surgeon: Yuri Washington MD;  [address]  Service: Cardiovascular;  Laterality: N/A;    VALVULOPLASTY, MITRAL VALVE N/A 12/1/2023    Procedure: VALVULOPLASTY, MITRAL VALVE;  Surgeon: Yuri Washington MD;  [facility];  Service: Cardiovascular;  Laterality: N/A;     No family history on file.  Social History[1]    All medications reviewed.    ROS    OBJECTIVE:  "    Vital Signs Trends/Hx Reviewed  Vitals:    07/12/25 0800 07/12/25 1045 07/12/25 1055 07/12/25 1231   BP:  (!) 84/0     BP Location:  Left arm     Patient Position:  Sitting     Pulse: 75 72 103    Resp:  16     Temp:  98.1 °F (36.7 °C)     TempSrc:  Oral     SpO2:       Weight:    75.8 kg (167 lb 1.7 oz)   Height:    6' 1" (1.854 m)       Physical Exam  Constitutional:       Appearance: Normal appearance. He is normal weight.   Abdominal:      General: There is no distension.      Palpations: Abdomen is soft.      Tenderness: There is no abdominal tenderness. There is guarding.   Neurological:      Mental Status: He is alert.           Laboratory:  No results for input(s): "PH", "PCO2", "PO2", "HCO3", "POCSATURATED", "BE" in the last 24 hours.  Recent Labs   Lab 07/12/25  0505   WBC 3.95   RBC 3.60*   HGB 10.3*   HCT 32.2*   *   MCV 89   MCH 28.6   MCHC 32.0     Recent Labs   Lab 07/12/25  0505 07/12/25  0913    138   K 2.4* 2.8*    105   CO2 25 24   BUN 15 16   CREATININE 4.0* 4.1*   CALCIUM 7.2* 7.8*   MG 1.6  --        Microbiology Data:   Microbiology Results (last 7 days)       Procedure Component Value Units Date/Time    Clostridium difficile EIA [[id number]] Collected: 07/12/25 0246    Order Status: Sent Specimen: Stool Updated: 07/12/25 0935    Blood culture [[id number]] Collected: 07/11/25 1953    Order Status: Sent Specimen: Blood from Peripheral, Upper Arm, Right Updated: 07/11/25 2220    Blood culture [[id number]] Collected: 07/11/25 1953    Order Status: Sent Specimen: Blood from Peripheral, Antecubital, Right Updated: 07/11/25 2220    Influenza A & B by Molecular [[id number]]  (Normal) Collected: 07/11/25 1634    Order Status: Completed Specimen: Nasal Swab Updated: 07/11/25 1841     INFLUENZA A MOLECULAR Negative     INFLUENZA B MOLECULAR  Negative    Culture, Respiratory with Gram Stain [[id number]]     Order Status: Sent Specimen: Respiratory               Scheduled " Medications:    amiodarone  400 mg Oral Daily    hydrALAZINE  10 mg Intravenous Once    hydrALAZINE  50 mg Oral Q8H    potassium chloride  30 mEq Oral Q4H    potassium, sodium phosphates  1 packet Oral QID (AC & HS)    vancomycin  125 mg Oral Q6H    venlafaxine  37.5 mg Oral Daily       PRN Medications:     Current Facility-Administered Medications:     dextrose 50%, 12.5 g, Intravenous, PRN    dextrose 50%, 25 g, Intravenous, PRN    glucagon (human recombinant), 1 mg, Intramuscular, PRN    glucose, 16 g, Oral, PRN    glucose, 24 g, Oral, PRN    insulin aspart U-100, 0-5 Units, Subcutaneous, QID (AC + HS) PRN    ASSESSMENT & RECOMMENDATIONS     This is a 64 y/o male with hx of one time confirmed c. Difficile who GI was consulted on for consideration of FMT. Our recommendations are as follows:     - No indication for FMT given pt with one time confirmed c.diff, and currently with no c.diff  - Diarrhea probably from taking long-term antibiotics that change GI microbiota   - To note is that if there is a positive c.diff antigen with no positive c.diff toxin this can indicate colonization of c.diff after prior c.diff infection and does not require treatment   - Can give immodium Prn and probiotics upon discharge     Thank you for allowing us to participate in the care of this patient. We will sign off. Please call with questions.    Malina Farmer MD PGY4  Gastroenterology Fellow  [facility]         [1]   Social History  Tobacco Use    Smoking status: Former     Current packs/day: 0.50     Types: Cigarettes   Vaping Use    Vaping status: Never Used   Substance Use Topics    Alcohol use: Yes     Comment: rarely    Drug use: No

## 2025-07-12 NOTE — H&P
[facility] - [facility] Stepdown  Cardiology  History and Physical     Patient Name: Radha Abbott  MRN: [id number]  Admission Date: 7/11/2025  Code Status: Full Code   Attending Provider: No att. providers found   Primary Care Physician: Vasu Kong MD  Principal Problem:<principal problem not specified>    Patient information was obtained from patient, spouse/SO, past medical records, and ER records.     Subjective:     Chief Complaint:  Weakness, diarrhea     HPI:  65 M PMHx of stage D HFrEF 2/2 ICM s/p HM3 as DT (12/1/2023), ESRD on HD (MWF), and debility here for ongoing diarrhea after treatment of c. difficile infection.     Pertinent history below:     5/9-11: Patient was recently hospitalized for anemia and C-difficile infection. At that time patient received a unit of blood, hydrated for low flow alarms, hydralazine given to target MAPs to 80s with Hydral 50mg TID, then put on extended vancomycin course with taper for discharge on 5/11  5 mLs (125 mg total) q6 hours for 9 days,   5 mLs (125 mg total) q12h day for 7 days,   5 mLs (125 mg total) qdaily for 7 days,   5 mLs (125 mg total) every other day for 14 days.    He was admitted again for ongoing diarrhea and low-flow alarms from 05/23 - 05/26/2025 and treated with IV fluids.  His last clinic visit with heart transplant Service with Dr. Snowden on 06/11/2023.  His C diff report was positive on August 2024, 05/10/2025.  He was negative for C diff infection on 05/23/2025, 07/10/2025    Patient is on hemodialysis for is ESRD Monday Wednesday Friday, he presented to the emergency room for feeling worsening of weakness since today morning after the dialysis.  Patient and his wife reports having diarrhea ongoing for few weeks and then loss of appetite for past 2-3 weeks.  Patient had low-flow alarms x6 today.  He is getting admitted to heart transplant Service.    Past Medical History:   Diagnosis Date    Aspiration pneumonia of both lungs 06/17/2024    CAD  (coronary artery disease)     CHF (congestive heart failure)     Delirium 06/16/2024    Diabetes mellitus     HFrEF (heart failure with reduced ejection fraction)     Hypoglycemia 06/12/2024    ICD (implantable cardioverter-defibrillator) in place     LVAD (left ventricular assist device) present 12/01/2023    MI, old     PAF (paroxysmal atrial fibrillation) 10/11/2023    Stage 4 chronic kidney disease 02/15/2024    Type 2 diabetes mellitus without complication, without long-term current use of insulin 10/07/2023       Past Surgical History:   Procedure Laterality Date    ANGIOPLASTY-VENOUS ARTERY Right 12/1/2023    Procedure: ANGIOPLASTY-VENOUS ARTERY, RIGHT FEMORAL;  Surgeon: Yuri Washington MD;  [facility];  Service: Cardiovascular;  Laterality: Right;    AORTIC VALVULOPLASTY N/A 12/1/2023    Procedure: REPAIR, AORTIC VALVE;  Surgeon: Yuri Washington MD;  [facility];  Service: Cardiovascular;  Laterality: N/A;    CARDIAC SURGERY      CLOSURE N/A 12/1/2023    Procedure: CLOSURE, TEMPORARY;  Surgeon: Yuri Washington MD;  Location: [facility] OR [facility] FLR;  Service: Cardiovascular;  Laterality: N/A;    DRAINAGE OF PLEURAL EFFUSION  12/4/2023    Procedure: DRAINAGE, PLEURAL EFFUSION;  Surgeon: Yuri Washington MD;  [facility];  Service: Cardiovascular;;    INSERTION OF GRAFT TO PERICARDIUM  12/4/2023    Procedure: INSERTION, GRAFT, PERICARDIUM;  Surgeon: Yuri Washington MD;  [facility];  Service: Cardiovascular;;    INSERTION OF INTRA-AORTIC BALLOON ASSIST DEVICE Right 11/21/2023    Procedure: INSERTION, INTRA-AORTIC BALLOON PUMP;  Surgeon: Finn Cohn MD;  Location: [facility] CATH LAB;  Service: Cardiology;  Laterality: Right;    LEFT VENTRICULAR ASSIST DEVICE Left 12/1/2023    Procedure: INSERTION-LEFT VENTRICULAR ASSIST DEVICE;  Surgeon: Yuri Washington MD;  Location: [facility] OR [facility];  Service: Cardiovascular;  Laterality: Left;  REDO STERNOTOMY - REDO SAW  NEEDED FOR CASE    LYSIS OF ADHESIONS  12/1/2023    Procedure: LYSIS, ADHESIONS;  Surgeon: Yuri Washington MD;  Location: [facility] OR [facility];  Service: Cardiovascular;;    PLACEMENT OF SWAN ROLANDO CATHETER WITH IMAGING GUIDANCE  11/20/2023    Procedure: INSERTION, CATHETER, SWAN-ROLANDO, WITH IMAGING GUIDANCE;  Surgeon: Sajan Hurley MD;  Location: [facility] CATH LAB;  Service: Cardiology;;    REMOVAL OF TUNNELED CENTRAL VENOUS CATHETER (CVC) N/A 3/1/2024    Procedure: REMOVAL, CATHETER, CENTRAL VENOUS, TUNNELED;  Surgeon: Seble Aguilar MD;  Location: [facility] CATH LAB;  Service: Interventional Nephrology;  Laterality: N/A;    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 12/1/2023    Procedure: REPAIR, ANEURYSM, ARTERY, FEMORAL;  Surgeon: Yuri Washington MD;  [facility];  Service: Cardiovascular;  Laterality: Right;  Right Femoral Artery Repair    RIGHT HEART CATHETERIZATION Right 10/10/2023    Procedure: INSERTION, CATHETER, RIGHT HEART;  Surgeon: Bin Gandhi MD;  Location: [facility] CATH LAB;  Service: Cardiology;  Laterality: Right;    RIGHT HEART CATHETERIZATION Right 10/13/2023    Procedure: INSERTION, CATHETER, RIGHT HEART;  Surgeon: Walter Mcintyre MD;  Location: [facility] CATH LAB;  Service: Cardiology;  Laterality: Right;    RIGHT HEART CATHETERIZATION  11/13/2023    RIGHT HEART CATHETERIZATION Right 11/13/2023    Procedure: INSERTION, CATHETER, RIGHT HEART;  Surgeon: Juventino Bermudez Jr., MD;  Location: [facility] CATH LAB;  Service: Cardiology;  Laterality: Right;    RIGHT HEART CATHETERIZATION Right 11/20/2023    Procedure: INSERTION, CATHETER, RIGHT HEART;  Surgeon: Sajan Hurley MD;  Location: [facility] CATH LAB;  Service: Cardiology;  Laterality: Right;    RIGHT HEART CATHETERIZATION Right 1/22/2024    Procedure: INSERTION, CATHETER, RIGHT HEART;  Surgeon: Brayan Ocampo MD;  Location: [facility] CATH LAB;  Service: Cardiology;  Laterality: Right;    STERNAL WOUND CLOSURE N/A 12/4/2023    Procedure:  CLOSURE, WOUND, STERNUM;  Surgeon: Yuri Washington MD;  [facility];  Service: Cardiovascular;  Laterality: N/A;    STERNOTOMY N/A 12/1/2023    Procedure: STERNOTOMY, REDO;  Surgeon: Yuri Washington MD;  [facility];  Service: Cardiovascular;  Laterality: N/A;    VALVULOPLASTY, MITRAL VALVE N/A 12/1/2023    Procedure: VALVULOPLASTY, MITRAL VALVE;  Surgeon: Yuri Washington MD;  [facility];  Service: Cardiovascular;  Laterality: N/A;       Review of patient's allergies indicates:  No Known Allergies    No current facility-administered medications on file prior to encounter.     Current Outpatient Medications on File Prior to Encounter   Medication Sig    amiodarone (PACERONE) 400 MG tablet Take 1 tablet (400 mg total) by mouth once daily.    atorvastatin (LIPITOR) 40 MG tablet Take 1 tablet (40 mg total) by mouth every evening.    hydrALAZINE (APRESOLINE) 50 MG tablet Take 1 tablet (50 mg total) by mouth every 8 (eight) hours.    omega 3-dha-epa-fish oil (FISH OIL) 1,000 (120-180) mg Cap Take 1 capsule by mouth once daily.    potassium, sodium phosphates (PHOS-NAK) 280-160-250 mg PwPk Take 1 packet by mouth 4 (four) times daily before meals and nightly.    pregabalin (LYRICA) 100 MG capsule Take 1 capsule by mouth every other day.    psyllium husk (METAMUCIL) 3.4 gram PwPk packet Take 2 packets by mouth 2 (two) times daily.    pulse oximeter (PULSE OXIMETER) device by Apply Externally route 2 (two) times a day. Use twice daily at 8 AM and 3 PM and [facility] as directed.    traZODone (DESYREL) 50 MG tablet Take 0.5 tablets (25 mg total) by mouth every evening.    vancomycin 25 mg/mL oral solution Take 5 mLs (125 mg total) 2 (two) times a day until 5/27, THEN 5 mLs (125 mg total) once daily for 7 days, THEN 5 mLs (125 mg total) every other day for 14 days.    venlafaxine (EFFEXOR) 37.5 MG Tab Take 1 tablet (37.5 mg total) by mouth once daily.    vitamin D  (VITAMIN D3) 1000 units Tab Take 1 tablet by mouth once daily    warfarin (COUMADIN) 1 MG tablet Take 1 tablet (1 mg total) by mouth Daily. Or as directed by coumadin clinic     Family History    None       Tobacco Use    Smoking status: Former     Current packs/day: 0.50     Types: Cigarettes    Smokeless tobacco: Not on file   Vaping Use    Vaping status: Never Used   Substance and Sexual Activity    Alcohol use: Yes     Comment: rarely    Drug use: No    Sexual activity: Not Currently     Partners: Female     Review of Systems   Constitutional: Positive for malaise/fatigue. Negative for chills and fever.   HENT:  Negative for congestion.    Eyes:  Negative for blurred vision and visual disturbance.   Cardiovascular:  Negative for chest pain, dyspnea on exertion, irregular heartbeat, leg swelling, near-syncope, orthopnea, palpitations, paroxysmal nocturnal dyspnea and syncope.   Respiratory:  Negative for cough, shortness of breath, sputum production and wheezing.    Skin:  Negative for color change.   Musculoskeletal:  Negative for arthritis and muscle weakness.   Gastrointestinal:  Positive for change in bowel habit and diarrhea. Negative for bloating, abdominal pain, nausea and vomiting.   Genitourinary:  Negative for dysuria.   Neurological:  Negative for dizziness and light-headedness.   Psychiatric/Behavioral:  The patient is not nervous/anxious.      Objective:     Vital Signs (Most Recent):  Temp: 98.3 °F (36.8 °C) (07/12/25 0401)  Pulse: 92 (07/12/25 0602)  Resp: 19 (07/12/25 0401)  BP: (!) 78/0 (07/12/25 0407)  SpO2: 96 % (07/12/25 0401) Vital Signs (24h Range):  Temp:  [98.2 °F (36.8 °C)-98.6 °F (37 °C)] 98.3 °F (36.8 °C)  Pulse:  [17-92] 92  Resp:  [12-19] 19  SpO2:  [79 %-97 %] 96 %  BP: ()/(0-95) 78/0     Weight: 75.8 kg (167 lb 1.7 oz)  Body mass index is 22.05 kg/m².    SpO2: 96 %         Intake/Output Summary (Last 24 hours) at 7/12/2025 6538  Last data filed at 7/12/2025 0243  Gross per 24  hour   Intake 118 ml   Output --   Net 118 ml       Lines/Drains/Airways       Central Venous Catheter Line  Duration                  Hemodialysis Catheter 06/24/24 1440 right internal jugular 382 days              Line  Duration                  VAD 12/01/23 1015 Left ventricular assist device HeartMate 3 588 days              Peripheral Intravenous Line  Duration             Peripheral IV 07/11/25 1632 20 G Right Hand <1 day    Peripheral IV Single Lumen 07/11/25 1957 20 G Anterior;Right Upper Arm <1 day                     Physical Exam  Constitutional:       General: He is not in acute distress.     Appearance: Normal appearance. He is not ill-appearing.   HENT:      Head: Normocephalic and atraumatic.      Mouth/Throat:      Mouth: Mucous membranes are moist.   Eyes:      Extraocular Movements: Extraocular movements intact.      Pupils: Pupils are equal, round, and reactive to light.   Neck:      Comments: JVD negative bilateral  Cardiovascular:      Rate and Rhythm: Normal rate and regular rhythm.      Heart sounds: No murmur heard.     No friction rub. No gallop.      Comments: VAD hum +  Pulmonary:      Effort: No respiratory distress.      Breath sounds: No rales.   Abdominal:      General: Bowel sounds are normal. There is no distension.      Tenderness: There is no abdominal tenderness.   Musculoskeletal:      Cervical back: No rigidity.      Right lower leg: Edema present.      Left lower leg: Edema present.   Skin:     General: Skin is warm.      Capillary Refill: Capillary refill takes less than 2 seconds.   Neurological:      General: No focal deficit present.      Mental Status: He is alert and oriented to person, place, and time.   Psychiatric:         Mood and Affect: Mood normal.         Behavior: Behavior normal.          Significant Labs: BMP:   Recent Labs   Lab 07/11/25  1812 07/12/25  0505   GLU 99 74    136   K 3.5 2.4*    104   CO2 23 25   BUN 12 15   CREATININE 3.3* 4.0*  "  CALCIUM 7.5* 7.2*   MG 1.7 1.6   , CMP   Recent Labs   Lab 07/11/25  1812 07/12/25  0505    136   K 3.5 2.4*    104   CO2 23 25   GLU 99 74   BUN 12 15   CREATININE 3.3* 4.0*   CALCIUM 7.5* 7.2*   PROT 6.2  --    ALBUMIN 2.3*  --    BILITOT 0.7  --    ALKPHOS 145  --    *  --    *  --    ANIONGAP 7* 7*   , CBC   Recent Labs   Lab 07/11/25  1634 07/12/25  0505   WBC 4.64 3.95   HGB 12.9* 10.3*   HCT 40.8 32.2*   * 108*   , INR   Recent Labs   Lab 07/10/25  0717 07/11/25  1634 07/12/25  0505   INR 3.3* 3.4* 3.4*   PROTIME 34.5* 34.1* 33.9*   , Lipid Panel No results for input(s): "CHOL", "HDL", "LDLCALC", "TRIG", "CHOLHDL" in the last 48 hours., and Troponin   Recent Labs   Lab 07/11/25  1634   TROPONINIHS 51*       Significant Imaging: Echocardiogram: 2D echo with color flow doppler: No results found. However, due to the size of the patient record, not all encounters were searched. Please check Results Review for a complete set of results. and Transthoracic echo (TTE) complete (Cupid Only):   Results for orders placed or performed during the hospital encounter of 04/02/25   Echo   Result Value Ref Range    LV Diastolic Volume 118 mL    Echo EF Estimated 8 %    LV Systolic Volume 108 mL    IVS 0.9 0.6 - 1.1 cm    LVIDd 5.0 3.5 - 6.0 cm    LVIDs 4.8 (A) 2.1 - 4.0 cm    LVOT diameter 2.1 cm    PW 0.9 0.6 - 1.1 cm    RV S' 6.15 cm/s    LA size 3.9 cm    Left Atrium Major Axis 5.3 cm    Left Atrium Minor Axis 4.3 cm    LA Vol (MOD) 59 mL    MV Peak A Zeferino 1.01 m/s    E wave deceleration time 263 ms    MV Peak E Zeferino 0.70 m/s    E/A ratio 0.69     LV LATERAL E/E' RATIO 14.0     LV SEPTAL E/E' RATIO 17.5     TDI LATERAL 0.05 m/s    TDI SEPTAL 0.04 m/s    TV peak gradient 20 mmHg    TR Max Zeferino 2.2 m/s    Ascending aorta 3.07 cm    STJ 2.83 cm    Sinus 3.27 cm    LA WIDTH 3.6 cm    TAPSE 1.23 cm    BSA 2.04 m2    LV Systolic Volume Index 52.7 mL/m2    LV Diastolic Volume Index 57.56 mL/m2    " LVOT area 3.5 cm2    FS 4.0 (A) 28 - 44 %    Left Ventricle Relative Wall Thickness 0.36 cm    LV mass 158.2 g    LV Mass Index 77.2 g/m2    E/E' ratio 16 m/s    KAVON 28 mL/m2    LA Vol 57 cm3    KAVON (MOD) 29 mL/m2    Mean e' 0.05 m/s    ZLVIDS 1.92     ZLVIDD -2.07     TV resting pulmonary artery pressure 27 mmHg    RV TB RVSP 10 mmHg    Est. RA pres 8 mmHg    IVC diameter 2.4 cm    LVAD  Rate 5,000 rpm    Narrative      LVAD: There is a Heartmate III LVAD running at 5,000 RPM.    Left Ventricle: The left ventricle is normal in size. Normal wall   thickness. Global hypokinesis present. There is severely reduced systolic   function with a visually estimated ejection fraction of 10 -15%. Unable to   assess diastolic function due to LVAD.    Right Ventricle: The right ventricle is normal in size. Right ventricle   wall motion has global hypokinesis. Systolic function is mildly reduced.   Pacemaker lead present in the ventricle.    Left Atrium: Left atrium is dilated.    Aortic Valve: The aortic valve repaired.    Mitral Valve: The mitral valve is repaired with an Noble stitch.    Pulmonary Artery: The estimated pulmonary artery systolic pressure is   27 mmHg.    IVC/SVC: Intermediate venous pressure at 8 mmHg.       Assessment and Plan:     Clostridium difficile infection  Check C diff  Started vancomycin 125 mg per oral every 6 hours  Infectious disease consult requested    Diarrhea of presumed infectious origin  Due to low-flow alarms and clinically volume dry  IV fluids bolus given (250 cc ringer lactate)    Chronic combined systolic and diastolic CHF, NYHA class 4    Recent Labs     07/11/25  1634   *     Latest ECHO  Results for orders placed during the hospital encounter of 04/02/25    Echo    Interpretation Summary    LVAD: There is a Heartmate III LVAD running at 5,000 RPM.    Left Ventricle: The left ventricle is normal in size. Normal wall thickness. Global hypokinesis present. There is severely  reduced systolic function with a visually estimated ejection fraction of 10 -15%. Unable to assess diastolic function due to LVAD.    Right Ventricle: The right ventricle is normal in size. Right ventricle wall motion has global hypokinesis. Systolic function is mildly reduced. Pacemaker lead present in the ventricle.    Left Atrium: Left atrium is dilated.    Aortic Valve: The aortic valve repaired.    Mitral Valve: The mitral valve is repaired with an Noble stitch.    Pulmonary Artery: The estimated pulmonary artery systolic pressure is 27 mmHg.    IVC/SVC: Intermediate venous pressure at 8 mmHg.    Current Heart Failure Medications  hydrALAZINE tablet 50 mg, Every 8 hours, Oral    Plan  - Monitor strict I&Os and daily weights.    - Place on telemetry  - Low sodium diet  - Place on fluid restriction of .   - no ACE inhibitor ARB,ARNI, MRA because of low blood pressure   - No beta blocker because of RV dysfunction  - start home hydralazine 50 t.i.d.            ESRD (end stage renal disease)  Creatine stable for now. BMP reviewed- noted Estimated Creatinine Clearance: 20.3 mL/min (A) (based on SCr of 4 mg/dL (H)). according to latest data. Based on current GFR, CKD stage is end stage.  Monitor UOP and serial BMP and adjust therapy as needed. Renally dose meds. Avoid nephrotoxic medications and procedures.  Nephrology consulted for dialysis    LVAD (left ventricular assist device) present  On Coumadin 1 mg daily at home  INR 3.4  Adjust dose accordingly  With interrogation to be performed    PAF (paroxysmal atrial fibrillation)  Patient has paroxysmal (<7 days) atrial fibrillation. [id number] Score: 2. The patients heart rate in the last 24 hours is as follows:  Pulse  Min: 17  Max: 92     Antiarrhythmics  amiodarone tablet 400 mg, Daily, Oral    Anticoagulants  warfarin (COUMADIN) tablet 1 mg, Daily, Oral    Plan  - Replete lytes with a goal of K>4, Mg >2          Type 2 diabetes mellitus without complication,  without long-term current use of insulin  Insulin sliding scale   Hypoglycemia protocol        VTE Risk Mitigation (From admission, onward)           Ordered     warfarin (COUMADIN) tablet 1 mg  Daily         07/11/25 4480                    Dougie Pandey MD  Cardiology   [facility] - Cardiology Stepdown

## 2025-07-12 NOTE — PROGRESS NOTES
07/12/25 0800        VAD 12/01/23 1015 Left ventricular assist device HeartMate 3   Placement Date/Time: 12/01/23 1015   Present Prior to Hospital Arrival?: No  VAD Type: Left ventricular assist device  VAD Brand: HeartMate 3   Site Location Abdomen right   Site Assessment Clean;Dry;Intact   Driveline Assessment Free of Kinks;Intact   Dressing Status Clean;Dry;Intact   Dressing Intervention Sterile dressing change   Performed By Caregiver   Dressing Change Schedule Every 7 days   Dressing Change Due 07/19/25   Integrity dry;intact;no damage   Driveline [place] in use [place] car[place]za   Condition CDI   Date changed 07/12/25   Post Removal Complications None     LVAD dressing change completed by patients wife using sterile technique with Silver LVAD kit. Tolerated without any complication. Driveline remains intact. No redness or tenderness reported by wife.

## 2025-07-12 NOTE — PROGRESS NOTES
[facility] - Cardiology Stepdown  Heart Transplant  Progress Note    Patient Name: Radha Abbott  MRN: [id number]  Admission Date: 7/11/2025  Hospital Length of Stay: 1 days  Attending Physician: No att. providers found  Primary Care Provider: Vasu Kong MD  Principal Problem:Diarrhea of presumed infectious origin    Subjective:   Interval History: HM 3 with recent hx of C-diff admitted with persistent diarrhea and LFA.  Patient hypertensive this am so will add one dose of Hydralazine and monitor LFA.  ID and GI consulted on admit.  Repeat C-diff studies ordered and pending.     Continuous Infusions:  Scheduled Meds:   amiodarone  400 mg Oral Daily    hydrALAZINE  50 mg Oral Q8H    potassium, sodium phosphates  1 packet Oral QID (AC & HS)    vancomycin  125 mg Oral Q6H    venlafaxine  37.5 mg Oral Daily     PRN Meds:  Current Facility-Administered Medications:     dextrose 50%, 12.5 g, Intravenous, PRN    dextrose 50%, 25 g, Intravenous, PRN    glucagon (human recombinant), 1 mg, Intramuscular, PRN    glucose, 16 g, Oral, PRN    glucose, 24 g, Oral, PRN    insulin aspart U-100, 0-5 Units, Subcutaneous, QID (AC + HS) PRN    Review of patient's allergies indicates:  No Known Allergies  Objective:     Vital Signs (Most Recent):  Temp: 99.4 °F (37.4 °C) (07/12/25 0752)  Pulse: 75 (07/12/25 0800)  Resp: 20 (07/12/25 0752)  BP: (!) 100/0 (07/12/25 0755)  SpO2: 100 % (07/12/25 0752) Vital Signs (24h Range):  Temp:  [98.2 °F (36.8 °C)-99.4 °F (37.4 °C)] 99.4 °F (37.4 °C)  Pulse:  [17-92] 75  Resp:  [12-20] 20  SpO2:  [79 %-100 %] 100 %  BP: ()/(0-95) 100/0     Patient Vitals for the past 72 hrs (Last 3 readings):   Weight   07/11/25 1623 75.8 kg (167 lb 1.7 oz)   07/11/25 1506 75.8 kg (167 lb)     Body mass index is 22.05 kg/m².      Intake/Output Summary (Last 24 hours) at 7/12/2025 1014  Last data filed at 7/12/2025 0243  Gross per 24 hour   Intake 118 ml   Output --   Net 118 ml       Hemodynamic Parameters:        Telemetry: reviewed     Physical Exam  Vitals and nursing note reviewed.   Constitutional:       Appearance: Normal appearance.   HENT:      Head: Normocephalic.      Nose: Nose normal.      Mouth/Throat:      Mouth: Mucous membranes are moist.   Eyes:      Pupils: Pupils are equal, round, and reactive to light.   Neck:      Comments: No JVP elevation   Cardiovascular:      Comments: Smooth VAD hum  Pulmonary:      Effort: Pulmonary effort is normal.      Breath sounds: Normal breath sounds.   Abdominal:      General: Bowel sounds are normal. There is no distension.      Palpations: Abdomen is soft.   Musculoskeletal:         General: Normal range of motion.      Right lower leg: No edema.      Left lower leg: No edema.   Skin:     General: Skin is warm.      Capillary Refill: Capillary refill takes 2 to 3 seconds.   Neurological:      General: No focal deficit present.      Mental Status: He is alert and oriented to person, place, and time.   Psychiatric:         Mood and Affect: Mood normal.         Behavior: Behavior normal.            Significant Labs:  CBC:  Recent Labs   Lab 07/11/25 1634 07/12/25  0505   WBC 4.64 3.95   RBC 4.54* 3.60*   HGB 12.9* 10.3*   HCT 40.8 32.2*   * 108*   MCV 90 89   MCH 28.4 28.6   MCHC 31.6* 32.0     BNP:  Recent Labs   Lab 07/11/25  1634   *     CMP:  Recent Labs   Lab 07/11/25 1812 07/12/25  0505 07/12/25  0913   GLU 99 74 75   CALCIUM 7.5* 7.2* 7.8*   ALBUMIN 2.3*  --   --    PROT 6.2  --   --     136 138   K 3.5 2.4* 2.8*   CO2 23 25 24    104 105   BUN 12 15 16   CREATININE 3.3* 4.0* 4.1*   ALKPHOS 145  --   --    *  --   --    *  --   --    BILITOT 0.7  --   --       Coagulation:   Recent Labs   Lab 07/10/25  0717 07/11/25  1634 07/12/25  0505   INR 3.3* 3.4* 3.4*   APTT  --   --  58.4*     LDH:  Recent Labs   Lab 07/11/25 1812 07/12/25  0505   * 351*     Microbiology:  Microbiology Results (last 7 days)       Procedure  Component Value Units Date/Time    Clostridium difficile EIA [[id number]] Collected: 07/12/25 0246    Order Status: Sent Specimen: Stool Updated: 07/12/25 0935    Blood culture [[id number]] Collected: 07/11/25 1953    Order Status: Sent Specimen: Blood from Peripheral, Upper Arm, Right Updated: 07/11/25 2220    Blood culture [[id number]] Collected: 07/11/25 1953    Order Status: Sent Specimen: Blood from Peripheral, Antecubital, Right Updated: 07/11/25 2220    Influenza A & B by Molecular [[id number]]  (Normal) Collected: 07/11/25 1634    Order Status: Completed Specimen: Nasal Swab Updated: 07/11/25 1841     INFLUENZA A MOLECULAR Negative     INFLUENZA B MOLECULAR  Negative    Culture, Respiratory with Gram Stain [[id number]]     Order Status: Sent Specimen: Respiratory             I have reviewed all pertinent labs within the past 24 hours.    Estimated Creatinine Clearance: 19.8 mL/min (A) (based on SCr of 4.1 mg/dL (H)).    Diagnostic Results:  I have reviewed all pertinent imaging results/findings within the past 24 hours.  Assessment and Plan:     No notes on file    * Diarrhea of presumed infectious origin  -Repeat C-diff studies pending  -GI and ID consulted  -IV fluid bolus given on admit.     LVAD (left ventricular assist device) present  -HeartMate 3 Implanted  12/1/2023  as DT  -Hold Coumadin, Goal INR 2.0-3.0   Supratherapeutic today. Home dose is 1mg Qdaily   -Antiplatelets Not on ASA  -LDH is stable overall today. Will continue to monitor daily.  -Speed set at 5000, LSL 4600 rpm  -Interrogation notable for LFA likely in the setting of HTN.  Will monitor with addition antihypertensives   -Not listed for OHTx    Procedure: Device Interrogation Including analysis of device parameters  Current Settings: Ventricular Assist Device  Review of device function is stable      7/12/2025     7:58 AM 7/12/2025     4:06 AM 7/12/2025    12:18 AM 7/11/2025     9:49 PM 7/11/2025     4:37 PM 6/11/2025    11:32 AM  5/26/2025    12:00 PM   TXP LVAD INTERROGATIONS   Type HeartMate3 HeartMate3 HeartMate3 HeartMate3 HeartMate3  HeartMate3   Flow 3.2 3.2 3.1 2.7 2.8  3.9   Speed 5000 5000 5000 5000 5100  5000   PI 7.6 8.3 8.3 11.2 11.2  4.4   Power ([place]) 3.5 3.4 3.4 3.5 3.6  3.4   LSL 4600 4600 4600 4600   4600   Pulsatility Intermittent pulse Intermittent pulse Intermittent pulse Pulse Pulse Intermittent pulse Pulse         Chronic combined systolic and diastolic CHF, NYHA class 4  -ICM s/p HM 3  -Last 2D Echo 4/3/25: LVEF 10-15%, LVEDD 5cm with speed at 5000  -Euvolemic on examination today  -Current diuretic regimen: N/A on HD MWF  -GDMT with N/A  -2g Na dietary restriction, 2000 mL fluid restriction, strict I/Os      PAF (paroxysmal atrial fibrillation)  -continue Amiodarone  -anticoagulated with coumadin     CAD (coronary artery disease)  -S/p 3vCABG in 2009  -Lipitor held  -Not on ASA post VAD    Anemia of chronic disease  -Due to underlying CKD  -Daily CBC    Type 2 diabetes mellitus without complication, without long-term current use of insulin  -Insulin sliding scale   -Hypoglycemia protocol    Clostridium difficile infection  -Check C diff  -Started vancomycin 125 mg per oral every 6 hours  -Infectious disease consult requested    ESRD (end stage renal disease)  -On HD MWF  -Nephrology consulted         DENIZ Faye  Heart Transplant  [facility] - Cardiology Stepdown

## 2025-07-12 NOTE — ASSESSMENT & PLAN NOTE
65 year old male with a history stage D HFrEF 2/2 ICM s/p HM3 as DT (12/1/2023), ESRD on HD (MWF), and clostridium difficile colitis for which was received treatment followed by a slow taper on oral vancomycin.  Shortly after completing his oral vancomycin taper, the patient developed diarrhea again.  Stool studies obtained on 7/10 were negative for C diff.  Patient presented for admission for evaluation of his ongoing diarrhea.    Repeat C diff testing ordered and is pending but I suspect this will be negative again.  Differential diagnosis includes continued disruption/dysregulation of his gut microbiome vs post-infectious irritable bowel syndrome.  Follow up on repeat stool studies.  If C diff and other infections are ruled out, then may consider anti-diarrhea agents like lomotil.  Consider starting the patient on probiotics.

## 2025-07-12 NOTE — SUBJECTIVE & OBJECTIVE
Past Medical History:   Diagnosis Date    Aspiration pneumonia of both lungs 06/17/2024    CAD (coronary artery disease)     CHF (congestive heart failure)     Delirium 06/16/2024    Diabetes mellitus     HFrEF (heart failure with reduced ejection fraction)     Hypoglycemia 06/12/2024    ICD (implantable cardioverter-defibrillator) in place     LVAD (left ventricular assist device) present 12/01/2023    MI, old     PAF (paroxysmal atrial fibrillation) 10/11/2023    Stage 4 chronic kidney disease 02/15/2024    Type 2 diabetes mellitus without complication, without long-term current use of insulin 10/07/2023       Past Surgical History:   Procedure Laterality Date    ANGIOPLASTY-VENOUS ARTERY Right 12/1/2023    Procedure: ANGIOPLASTY-VENOUS ARTERY, RIGHT FEMORAL;  Surgeon: Yuri Washington MD;  [facility];  Service: Cardiovascular;  Laterality: Right;    AORTIC VALVULOPLASTY N/A 12/1/2023    Procedure: REPAIR, AORTIC VALVE;  Surgeon: Yuri Washington MD;  [facility];  Service: Cardiovascular;  Laterality: N/A;    CARDIAC SURGERY      CLOSURE N/A 12/1/2023    Procedure: CLOSURE, TEMPORARY;  Surgeon: Yuri Washington MD;  Location: [facility] OR [facility] FLR;  Service: Cardiovascular;  Laterality: N/A;    DRAINAGE OF PLEURAL EFFUSION  12/4/2023    Procedure: DRAINAGE, PLEURAL EFFUSION;  Surgeon: Yuri Washington MD;  Location: [facility] OR [facility] FLR;  Service: Cardiovascular;;    INSERTION OF GRAFT TO PERICARDIUM  12/4/2023    Procedure: INSERTION, GRAFT, PERICARDIUM;  Surgeon: Yuri Washington MD;  [facility];  Service: Cardiovascular;;    INSERTION OF INTRA-AORTIC BALLOON ASSIST DEVICE Right 11/21/2023    Procedure: INSERTION, INTRA-AORTIC BALLOON PUMP;  Surgeon: Finn Cohn MD;  Location: [facility] CATH LAB;  Service: Cardiology;  Laterality: Right;    LEFT VENTRICULAR ASSIST DEVICE Left 12/1/2023    Procedure: INSERTION-LEFT VENTRICULAR ASSIST DEVICE;  Surgeon: Yuri Washington MD;   Location: [address] FLR;  Service: Cardiovascular;  Laterality: Left;  REDO STERNOTOMY - REDO SAW NEEDED FOR CASE    LYSIS OF ADHESIONS  12/1/2023    Procedure: LYSIS, ADHESIONS;  Surgeon: Yuri Washington MD;  [address]  Service: Cardiovascular;;    PLACEMENT OF SWAN ROLANDO CATHETER WITH IMAGING GUIDANCE  11/20/2023    Procedure: INSERTION, CATHETER, SWAN-ROLANDO, WITH IMAGING GUIDANCE;  Surgeon: Sajan Hurley MD;  Location: [facility] CATH LAB;  Service: Cardiology;;    REMOVAL OF TUNNELED CENTRAL VENOUS CATHETER (CVC) N/A 3/1/2024    Procedure: REMOVAL, CATHETER, CENTRAL VENOUS, TUNNELED;  Surgeon: Seble Aguilar MD;  Location: [facility] CATH LAB;  Service: Interventional Nephrology;  Laterality: N/A;    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 12/1/2023    Procedure: REPAIR, ANEURYSM, ARTERY, FEMORAL;  Surgeon: Yuri Washington MD;  [address]  Service: Cardiovascular;  Laterality: Right;  Right Femoral Artery Repair    RIGHT HEART CATHETERIZATION Right 10/10/2023    Procedure: INSERTION, CATHETER, RIGHT HEART;  Surgeon: Bin Gandhi MD;  [facility] CATH LAB;  Service: Cardiology;  Laterality: Right;    RIGHT HEART CATHETERIZATION Right 10/13/2023    Procedure: INSERTION, CATHETER, RIGHT HEART;  Surgeon: Walter Mcintyre MD;  Location: [facility] CATH LAB;  Service: Cardiology;  Laterality: Right;    RIGHT HEART CATHETERIZATION  11/13/2023    RIGHT HEART CATHETERIZATION Right 11/13/2023    Procedure: INSERTION, CATHETER, RIGHT HEART;  Surgeon: Juventino Bermudez Jr., MD;  Location: [facility] CATH LAB;  Service: Cardiology;  Laterality: Right;    RIGHT HEART CATHETERIZATION Right 11/20/2023    Procedure: INSERTION, CATHETER, RIGHT HEART;  Surgeon: Sajan Hurley MD;  Location: [facility] CATH LAB;  Service: Cardiology;  Laterality: Right;    RIGHT HEART CATHETERIZATION Right 1/22/2024    Procedure: INSERTION, CATHETER, RIGHT HEART;  Surgeon: Brayan Ocampo MD;  Location: [facility]  LAB;  Service: Cardiology;  Laterality: Right;    STERNAL WOUND CLOSURE N/A 12/4/2023    Procedure: CLOSURE, WOUND, STERNUM;  Surgeon: Yuri Washington MD;  [facility]  Service: Cardiovascular;  Laterality: N/A;    STERNOTOMY N/A 12/1/2023    Procedure: STERNOTOMY, REDO;  Surgeon: Yuri Washington MD;  [facility];  Service: Cardiovascular;  Laterality: N/A;    VALVULOPLASTY, MITRAL VALVE N/A 12/1/2023    Procedure: VALVULOPLASTY, MITRAL VALVE;  Surgeon: Yuri Washington MD;  [facility];  Service: Cardiovascular;  Laterality: N/A;       Review of patient's allergies indicates:  No Known Allergies  Current Facility-Administered Medications   Medication Frequency    amiodarone tablet 400 mg Daily    dextrose 50% injection 12.5 g PRN    dextrose 50% injection 25 g PRN    glucagon (human recombinant) injection 1 mg PRN    glucose chewable tablet 16 g PRN    glucose chewable tablet 24 g PRN    hydrALAZINE tablet 50 mg Q8H    insulin aspart U-100 pen 0-5 Units QID (AC + HS) PRN    potassium chloride CR capsule 30 mEq Q4H    potassium, sodium phosphates 280-160-250 mg packet 1 packet QID (AC & HS)    vancomycin 125 mg/5 mL oral solution 125 mg Q6H    venlafaxine tablet 37.5 mg Daily     Family History    None       Tobacco Use    Smoking status: Former     Current packs/day: 0.50     Types: Cigarettes    Smokeless tobacco: Not on file   Vaping Use    Vaping status: Never Used   Substance and Sexual Activity    Alcohol use: Yes     Comment: rarely    Drug use: No    Sexual activity: Not Currently     Partners: Female     Review of Systems   Constitutional:  Positive for appetite change and fatigue. Negative for chills and fever.   Respiratory:  Negative for cough and shortness of breath.    Cardiovascular:  Negative for chest pain, palpitations and leg swelling.   Gastrointestinal:  Positive for diarrhea. Negative for abdominal pain, nausea and vomiting.   Genitourinary:  Positive  for decreased urine volume.   Skin: Negative.    Objective:     Vital Signs (Most Recent):  Temp: 99.4 °F (37.4 °C) (07/12/25 0752)  Pulse: 75 (07/12/25 0800)  Resp: 20 (07/12/25 0752)  BP: (!) 100/0 (07/12/25 0755)  SpO2: 100 % (07/12/25 0752) Vital Signs (24h Range):  Temp:  [98.2 °F (36.8 °C)-99.4 °F (37.4 °C)] 99.4 °F (37.4 °C)  Pulse:  [17-92] 75  Resp:  [12-20] 20  SpO2:  [79 %-100 %] 100 %  BP: ()/(0-95) 100/0     Weight: 75.8 kg (167 lb 1.7 oz) (07/11/25 1623)  Body mass index is 22.05 kg/m².  Body surface area is 1.98 meters squared.    I/O last 3 completed shifts:  In: 118 [P.O.:118]  Out: -      Physical Exam  Vitals and nursing note reviewed.   Eyes:      Conjunctiva/sclera: Conjunctivae normal.   Cardiovascular:      Comments: LVAD   Pulmonary:      Effort: Pulmonary effort is normal.      Breath sounds: Normal breath sounds.   Abdominal:      Palpations: Abdomen is soft.   Musculoskeletal:      Right lower leg: No edema.      Left lower leg: No edema.   Neurological:      Mental Status: He is alert and oriented to person, place, and time.   Psychiatric:         Mood and Affect: Mood normal.         Behavior: Behavior normal.      Significant Labs:  CBC:   Recent Labs   Lab 07/12/25  0505   WBC 3.95   RBC 3.60*   HGB 10.3*   HCT 32.2*   *   MCV 89   MCH 28.6   MCHC 32.0     CMP:   Recent Labs   Lab 07/11/25  1812 07/12/25  0505 07/12/25  0913   GLU 99   < > 75   CALCIUM 7.5*   < > 7.8*   ALBUMIN 2.3*  --   --    PROT 6.2  --   --       < > 138   K 3.5   < > 2.8*   CO2 23   < > 24      < > 105   BUN 12   < > 16   CREATININE 3.3*   < > 4.1*   ALKPHOS 145  --   --    *  --   --    *  --   --    BILITOT 0.7  --   --     < > = values in this interval not displayed.     All labs within the past 24 hours have been reviewed.

## 2025-07-12 NOTE — ASSESSMENT & PLAN NOTE
ESRD on iHD MWF  [facility] ([facility])  3 hours  EDW:  82.1 kg  RI TDC  Last HD prior to presentation 7/11/25     Plan/Recommendations:  -No emergent need for RRT at this time. Resume MWF schedule 7/14  -plan to continue 3x week dialysis while IP.  -OK to continue regular diet. K 2.4 replete K and phos as needed   -1.5L fluid restrictions  -continue strict I/O's  -hold phos binder until Phos > 5     Anemia of ESRD  -On Micera as OP q 2 weeks;  defer VALENTIN to his OP dialysis unit  -PRN transfusion per primary team

## 2025-07-12 NOTE — ASSESSMENT & PLAN NOTE
-Check C diff  -Started vancomycin 125 mg per oral every 6 hours  -Infectious disease consult requested

## 2025-07-12 NOTE — ASSESSMENT & PLAN NOTE
-HeartMate 3 Implanted  12/1/2023  as DT  -Hold Coumadin, Goal INR 2.0-3.0   Supratherapeutic today. Home dose is 1mg Qdaily   -Antiplatelets Not on ASA  -LDH is stable overall today. Will continue to monitor daily.  -Speed set at 5000, LSL 4600 rpm  -Interrogation notable for LFA likely in the setting of HTN.  Will monitor with addition antihypertensives   -Not listed for OHTx    Procedure: Device Interrogation Including analysis of device parameters  Current Settings: Ventricular Assist Device  Review of device function is stable      7/12/2025     7:58 AM 7/12/2025     4:06 AM 7/12/2025    12:18 AM 7/11/2025     9:49 PM 7/11/2025     4:37 PM 6/11/2025    11:32 AM 5/26/2025    12:00 PM   TXP LVAD INTERROGATIONS   Type HeartMate3 HeartMate3 HeartMate3 HeartMate3 HeartMate3  HeartMate3   Flow 3.2 3.2 3.1 2.7 2.8  3.9   Speed 5000 5000 5000 5000 5100  5000   PI 7.6 8.3 8.3 11.2 11.2  4.4   Power ([place]) 3.5 3.4 3.4 3.5 3.6  3.4   LSL 4600 4600 4600 4600   4600   Pulsatility Intermittent pulse Intermittent pulse Intermittent pulse Pulse Pulse Intermittent pulse Pulse

## 2025-07-12 NOTE — PROGRESS NOTES
07/12/25 1630   Vital Signs   BP (!) 100/0   BP Location Left arm   BP Method Doppler   Patient Position Lying     Patient asymptomatic. Notified MD Obi. Hydralazine dose adjusted per MAR.

## 2025-07-12 NOTE — PT/OT/SLP EVAL
"Occupational Therapy   Evaluation    Name: Radha Abbott  MRN: [id number]  Admitting Diagnosis: Diarrhea of presumed infectious origin  Recent Surgery: * No surgery found *      Recommendations:     Discharge Recommendations: Moderate Intensity Therapy  Discharge Equipment Recommendations:  power chair  Barriers to discharge:   (increased skilled A needed)    Assessment:     Radha Abbott is a 65 y.o. male with a medical diagnosis of Diarrhea of presumed infectious origin.  He presents with performance deficits affecting function: weakness, impaired endurance, impaired self care skills, impaired functional mobility, gait instability, impaired balance, impaired coordination, impaired cardiopulmonary response to activity, decreased lower extremity function, decreased upper extremity function, decreased coordination, decreased safety awareness.      Pt encountered with HOB raised, pleasant demeanor and agreeable to therapy with wife at bedside.  Pt reports having sat in chair already this morning ~2-3 hours with wife assist with transfer using stand pivot method.  Pt and wife report progressive weakness this week which amounted to fall Friday 7/11 in bathroom with "legs giving out" under pt.  This date, pt able to complete bed mobility with light assistance, STS transfer with significant assistance, and bed to chair transfer with significant assistance using RW and step transfer method.  Once pt in front of upright chair, he completed ~5 bilateral small steps in place and then coached to heighten steps in place ~5 more times with difficulty doing so 2* weakness/fatigue. Pt made comfortable in chair with wife present.  Patient currently demonstrates a need for moderate intensity therapy on a daily basis post acute secondary to a decline in functional status due to illness        Rehab Prognosis: Good; patient would benefit from acute skilled OT services to address these deficits and reach maximum level of function.   " "    Plan:     Patient to be seen 4 x/week to address the above listed problems via self-care/home management, therapeutic activities, therapeutic exercises  Plan of Care Expires: 08/02/25  Plan of Care Reviewed with: spouse, patient    Subjective     Chief Complaint: "I'm just getting weaker"   Patient/Family Comments/goals: Get better, return home     Occupational Profile:  Living Environment: Pt lives with wife in [facility] with small threshold step, WIS + bib + gb  Previous level of function: pt required A with transfers, primarily Mod I with rollator and wheelchair with some assistance propelling wheelchair. Pt able to complete bathing with set up assistance for sponge baths and needs A with LB dressing, able to complete upper body dressing.  Pt observed to be using disposable underwear this date and wife/pt worked together to change soiled underwear while supine   Roles and Routines: /community dweller  Equipment Used at Home: bath bench, grab bar, wheelchair, bedside commode, walker, rolling, rollator  Assistance upon Discharge: Wife and sister     Pain/Comfort:  Pain Rating 1: 0/10  Pain Rating Post-Intervention 1: 0/10    Patients cultural, spiritual, Quaker conflicts given the current situation: no    Objective:     Communicated with: RN prior to session.  Patient found HOB elevated with LVAD, telemetry upon OT entry to room.    General Precautions: Standard, fall  Orthopedic Precautions: N/A  Braces: N/A  Respiratory Status: Room air    Occupational Performance:    Bed Mobility:    Patient completed Rolling/Turning to Right with contact guard assistance  Patient completed Scooting/Bridging with contact guard assistance  Patient completed Supine to Sit with contact guard assistance  Pt able to sit EOB with good upright balance     Functional Mobility/Transfers:  Patient completed Sit <> Stand Transfer with minimum assistance  with  rolling walker from EOB  Patient completed Bed <> Chair Transfer using " Step Transfer technique with moderate assistance with rolling walker with multisensory cues for BLE placement, ~4'  Pt completed ~5 small marches with RW/min A while standing in place at upright chair, and then 5 slightly higher marches with RW/min A before sitting to rest in upright chair, requiring multiple cues for safe hand placement    Activities of Daily Living:  Upper Body Dressing: minimum assistance donning 2nd gown as robe   Lower Body Dressing: minimum assistance doffing soiled disposable underwear and donning fresh underwear while supine with wife's assistance with pt bridging hips and pulling over one side of hip while wife assisted with other   Toileting: total assistance wife assisted with posterior mildred-care this date   LVAD: Pt reports receiving assistance with transfer [facility] wall from his sister and wife    Cognitive/Visual Perceptual:  Cognitive/Psychosocial Skills:     -       Oriented to: AOX4   -       Follows Commands/attention:Follows multistep  commands  -       Communication: clear/fluent  -       Memory: Poor immediate recall  -       Safety awareness/insight to disability: impaired   -       Mood/Affect/Coping skills/emotional control: Pleasant  Visual/Perceptual:      -Intact      Physical Exam:  Balance:    -       Sitting: Intact.  Standing: Impaired  Postural examination/scapula alignment:    -       No postural abnormalities identified  Upper Extremity Range of Motion:     -       Right Upper Extremity: WFL  -       Left Upper Extremity: WFL  Upper Extremity Strength:    -       Right Upper Extremity: WFL  -       Left Upper Extremity: WFL   Strength:    -       Right Upper Extremity: WFL  -       Left Upper Extremity: WFL  Fine Motor Coordination:    -       Intact  Neurological:    -       impaired    AMPAC 6 Click ADL:  AMPAC Total Score: 17    Treatment & Education:  Pt educated on role of OT, POC, and goals for therapy.    POC was dicussed with  patient/caregiver, who was included in its development and is in agreement with the identified goals and treatment plan.   Patient and family aware of patient's deficits and therapy progression.   Time provided for therapeutic counseling and discussion of health disposition.   Educated on importance of EOB/OOB mobility, maintaining routine, sitting up in chair, and maximizing independence with ADLs during admission   Pt completed ADLs and functional mobility for treatment session as noted above   Pt/caregiver verbalized understanding and expressed no further concerns/questions.  Updated communication board with level of assist required       Patient left up in chair with all lines intact, call button in reach, RN notified, and wife present    GOALS:   Multidisciplinary Problems       Occupational Therapy Goals          Problem: Occupational Therapy    Goal Priority Disciplines Outcome Interventions   Occupational Therapy Goal     OT, PT/OT Progressing    Description: Goals to be met by: 8/2/25     Patient will increase functional independence with ADLs by performing:    UE Dressing with Contact Guard Assistance.  LE Dressing with Contact Guard Assistance.  Grooming while seated at sink with Stand-by Assistance.  Toileting from toilet with Contact Guard Assistance for hygiene and clothing management.   Rolling to Bilateral with Supervision.   Supine to sit with Supervision.  Step transfer with Minimal Assistance and LRAD  Toilet transfer to toilet with Minimal Assistance and LRAD.  Upper extremity exercise program x10 reps per handout, with independence.                       DME Justifications:  No DME recommended requiring DME justifications    History:     Past Medical History:   Diagnosis Date    Aspiration pneumonia of both lungs 06/17/2024    CAD (coronary artery disease)     CHF (congestive heart failure)     Delirium 06/16/2024    Diabetes mellitus     HFrEF (heart failure with reduced ejection fraction)      Hypoglycemia 06/12/2024    ICD (implantable cardioverter-defibrillator) in place     LVAD (left ventricular assist device) present 12/01/2023    MI, old     PAF (paroxysmal atrial fibrillation) 10/11/2023    Stage 4 chronic kidney disease 02/15/2024    Type 2 diabetes mellitus without complication, without long-term current use of insulin 10/07/2023         Past Surgical History:   Procedure Laterality Date    ANGIOPLASTY-VENOUS ARTERY Right 12/1/2023    Procedure: ANGIOPLASTY-VENOUS ARTERY, RIGHT FEMORAL;  Surgeon: Yuri Washington MD;  [facility];  Service: Cardiovascular;  Laterality: Right;    AORTIC VALVULOPLASTY N/A 12/1/2023    Procedure: REPAIR, AORTIC VALVE;  Surgeon: Yuri Washington MD;  [facility];  Service: Cardiovascular;  Laterality: N/A;    CARDIAC SURGERY      CLOSURE N/A 12/1/2023    Procedure: CLOSURE, TEMPORARY;  Surgeon: Yuri Washington MD;  [facility];  Service: Cardiovascular;  Laterality: N/A;    DRAINAGE OF PLEURAL EFFUSION  12/4/2023    Procedure: DRAINAGE, PLEURAL EFFUSION;  Surgeon: Yuri Washington MD;  [facility];  Service: Cardiovascular;;    INSERTION OF GRAFT TO PERICARDIUM  12/4/2023    Procedure: INSERTION, GRAFT, PERICARDIUM;  Surgeon: Yuri Washington MD;  [facility];  Service: Cardiovascular;;    INSERTION OF INTRA-AORTIC BALLOON ASSIST DEVICE Right 11/21/2023    Procedure: INSERTION, INTRA-AORTIC BALLOON PUMP;  Surgeon: Finn Cohn MD;  Location: [facility] CATH LAB;  Service: Cardiology;  Laterality: Right;    LEFT VENTRICULAR ASSIST DEVICE Left 12/1/2023    Procedure: INSERTION-LEFT VENTRICULAR ASSIST DEVICE;  Surgeon: Yuri Washington MD;  Location: [facility] OR [facility];  Service: Cardiovascular;  Laterality: Left;  REDO STERNOTOMY - REDO SAW NEEDED FOR CASE    LYSIS OF ADHESIONS  12/1/2023    Procedure: LYSIS, ADHESIONS;  Surgeon: Yuri Washington MD;  Location: [facility] OR [facility];  Service: Cardiovascular;;     PLACEMENT OF SWAN ROLANDO CATHETER WITH IMAGING GUIDANCE  11/20/2023    Procedure: INSERTION, CATHETER, SWAN-ROLANDO, WITH IMAGING GUIDANCE;  Surgeon: Sajan Hurley MD;  Location: [facility] CATH LAB;  Service: Cardiology;;    REMOVAL OF TUNNELED CENTRAL VENOUS CATHETER (CVC) N/A 3/1/2024    Procedure: REMOVAL, CATHETER, CENTRAL VENOUS, TUNNELED;  Surgeon: Seble Aguilar MD;  Location: [facility] CATH LAB;  Service: Interventional Nephrology;  Laterality: N/A;    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 12/1/2023    Procedure: REPAIR, ANEURYSM, ARTERY, FEMORAL;  Surgeon: Yuri Washington MD;  Location: [facility] OR [address] [place] [address];  Service: Cardiovascular;  Laterality: Right;  Right Femoral Artery Repair    RIGHT HEART CATHETERIZATION Right 10/10/2023    Procedure: INSERTION, CATHETER, RIGHT HEART;  Surgeon: Bin Gandhi MD;  Location: [facility] CATH LAB;  Service: Cardiology;  Laterality: Right;    RIGHT HEART CATHETERIZATION Right 10/13/2023    Procedure: INSERTION, CATHETER, RIGHT HEART;  Surgeon: Walter Mcintyre MD;  Location: [facility] CATH LAB;  Service: Cardiology;  Laterality: Right;    RIGHT HEART CATHETERIZATION  11/13/2023    RIGHT HEART CATHETERIZATION Right 11/13/2023    Procedure: INSERTION, CATHETER, RIGHT HEART;  Surgeon: Juventino Bermudez Jr., MD;  Location: [facility] CATH LAB;  Service: Cardiology;  Laterality: Right;    RIGHT HEART CATHETERIZATION Right 11/20/2023    Procedure: INSERTION, CATHETER, RIGHT HEART;  Surgeon: Sajan Hurley MD;  Location: [facility] CATH LAB;  Service: Cardiology;  Laterality: Right;    RIGHT HEART CATHETERIZATION Right 1/22/2024    Procedure: INSERTION, CATHETER, RIGHT HEART;  Surgeon: Brayan Ocampo MD;  Location: [facility] CATH LAB;  Service: Cardiology;  Laterality: Right;    STERNAL WOUND CLOSURE N/A 12/4/2023    Procedure: CLOSURE, WOUND, STERNUM;  Surgeon: Yuri Washington MD;  [facility];  Service: Cardiovascular;  Laterality: N/A;    STERNOTOMY N/A 12/1/2023    Procedure:  STERNOTOMY, REDO;  Surgeon: Yuri Washington MD;  Location: [facility] OR [address] [place] [address];  Service: Cardiovascular;  Laterality: N/A;    VALVULOPLASTY, MITRAL VALVE N/A 12/1/2023    Procedure: VALVULOPLASTY, MITRAL VALVE;  Surgeon: Yuri Washington MD;  Location: [facility] OR [address] [place] [address];  Service: Cardiovascular;  Laterality: N/A;       Time Tracking:     OT Date of Treatment: 07/12/25  OT Start Time: 1337  OT Stop Time: 1405  OT Total Time (min): 28 min    Billable Minutes:Evaluation 8  Therapeutic Activity 20    7/12/2025

## 2025-07-12 NOTE — CONSULTS
[facility] - Cardiology Stepdown  Nephrology  Consult Note    Patient Name: Radha Abbott  MRN: [id number]  Admission Date: 7/11/2025  Hospital Length of Stay: 1 days  Attending Provider: No att. providers found   Primary Care Physician: Vasu Kong MD  Principal Problem:Diarrhea of presumed infectious origin    Inpatient consult to Nephrology  Consult performed by: Yolanda Cai DNP  Consult ordered by: Dougie Pandey MD  Reason for consult: ESRD        Subjective:     HPI: 65 M PMHx of stage D HFrEF 2/2 ICM s/p HM3 as DT (12/1/2023), ESRD on HD (MWF), and debility here for ongoing diarrhea after treatment of c. difficile infection. Recent admissions for ongoing diarrhea. Pt c/o of weakness after HD yesterday. 1.5L removed yesterday with HD. Also experienced low flow alarms x 6. K 2.4. Nephrology consulted for ESRD.     Past Medical History:   Diagnosis Date    Aspiration pneumonia of both lungs 06/17/2024    CAD (coronary artery disease)     CHF (congestive heart failure)     Delirium 06/16/2024    Diabetes mellitus     HFrEF (heart failure with reduced ejection fraction)     Hypoglycemia 06/12/2024    ICD (implantable cardioverter-defibrillator) in place     LVAD (left ventricular assist device) present 12/01/2023    MI, old     PAF (paroxysmal atrial fibrillation) 10/11/2023    Stage 4 chronic kidney disease 02/15/2024    Type 2 diabetes mellitus without complication, without long-term current use of insulin 10/07/2023       Past Surgical History:   Procedure Laterality Date    ANGIOPLASTY-VENOUS ARTERY Right 12/1/2023    Procedure: ANGIOPLASTY-VENOUS ARTERY, RIGHT FEMORAL;  Surgeon: Yuri Washington MD;  Location: [facility] OR [address] [place] [address];  Service: Cardiovascular;  Laterality: Right;    AORTIC VALVULOPLASTY N/A 12/1/2023    Procedure: REPAIR, AORTIC VALVE;  Surgeon: Yuri Washington MD;  Location: [facility] OR [address] [place] [address];  Service: Cardiovascular;  Laterality: N/A;    CARDIAC SURGERY      CLOSURE N/A 12/1/2023     Procedure: CLOSURE, TEMPORARY;  Surgeon: Yuri Washington MD;  Location: [facility] OR [facility];  Service: Cardiovascular;  Laterality: N/A;    DRAINAGE OF PLEURAL EFFUSION  12/4/2023    Procedure: DRAINAGE, PLEURAL EFFUSION;  Surgeon: Yuri Washington MD;  [facility];  Service: Cardiovascular;;    INSERTION OF GRAFT TO PERICARDIUM  12/4/2023    Procedure: INSERTION, GRAFT, PERICARDIUM;  Surgeon: Yuri Washington MD;  [facility];  Service: Cardiovascular;;    INSERTION OF INTRA-AORTIC BALLOON ASSIST DEVICE Right 11/21/2023    Procedure: INSERTION, INTRA-AORTIC BALLOON PUMP;  Surgeon: Finn Cohn MD;  Location: [facility] CATH LAB;  Service: Cardiology;  Laterality: Right;    LEFT VENTRICULAR ASSIST DEVICE Left 12/1/2023    Procedure: INSERTION-LEFT VENTRICULAR ASSIST DEVICE;  Surgeon: Yuri Washington MD;  [facility];  Service: Cardiovascular;  Laterality: Left;  REDO STERNOTOMY - REDO SAW NEEDED FOR CASE    LYSIS OF ADHESIONS  12/1/2023    Procedure: LYSIS, ADHESIONS;  Surgeon: Yuri Washington MD;  [facility];  Service: Cardiovascular;;    PLACEMENT OF SWAN ROLANDO CATHETER WITH IMAGING GUIDANCE  11/20/2023    Procedure: INSERTION, CATHETER, SWAN-ROLANDO, WITH IMAGING GUIDANCE;  Surgeon: Sajan Hurley MD;  Location: [facility] CATH LAB;  Service: Cardiology;;    REMOVAL OF TUNNELED CENTRAL VENOUS CATHETER (CVC) N/A 3/1/2024    Procedure: REMOVAL, CATHETER, CENTRAL VENOUS, TUNNELED;  Surgeon: Seble Aguilar MD;  Location: [facility] CATH LAB;  Service: Interventional Nephrology;  Laterality: N/A;    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 12/1/2023    Procedure: REPAIR, ANEURYSM, ARTERY, FEMORAL;  Surgeon: Yuri Washington MD;  Location: [address];  Service: Cardiovascular;  Laterality: Right;  Right Femoral Artery Repair    RIGHT HEART CATHETERIZATION Right 10/10/2023    Procedure: INSERTION, CATHETER, RIGHT HEART;  Surgeon: Bin Gandhi MD;  Location: [facility]  CATH LAB;  Service: Cardiology;  Laterality: Right;    RIGHT HEART CATHETERIZATION Right 10/13/2023    Procedure: INSERTION, CATHETER, RIGHT HEART;  Surgeon: Walter Mcintyre MD;  Location: [facility] CATH LAB;  Service: Cardiology;  Laterality: Right;    RIGHT HEART CATHETERIZATION  11/13/2023    RIGHT HEART CATHETERIZATION Right 11/13/2023    Procedure: INSERTION, CATHETER, RIGHT HEART;  Surgeon: Juventino Bermudez Jr., MD;  Location: [facility] CATH LAB;  Service: Cardiology;  Laterality: Right;    RIGHT HEART CATHETERIZATION Right 11/20/2023    Procedure: INSERTION, CATHETER, RIGHT HEART;  Surgeon: Sajan Hurley MD;  Location: [facility] CATH LAB;  Service: Cardiology;  Laterality: Right;    RIGHT HEART CATHETERIZATION Right 1/22/2024    Procedure: INSERTION, CATHETER, RIGHT HEART;  Surgeon: Brayan Ocampo MD;  Location: [facility] CATH LAB;  Service: Cardiology;  Laterality: Right;    STERNAL WOUND CLOSURE N/A 12/4/2023    Procedure: CLOSURE, WOUND, STERNUM;  Surgeon: Yuri Washington MD;  [facility];  Service: Cardiovascular;  Laterality: N/A;    STERNOTOMY N/A 12/1/2023    Procedure: STERNOTOMY, REDO;  Surgeon: Yuri Washington MD;  [facility];  Service: Cardiovascular;  Laterality: N/A;    VALVULOPLASTY, MITRAL VALVE N/A 12/1/2023    Procedure: VALVULOPLASTY, MITRAL VALVE;  Surgeon: Yuri Washington MD;  [facility];  Service: Cardiovascular;  Laterality: N/A;       Review of patient's allergies indicates:  No Known Allergies  Current Facility-Administered Medications   Medication Frequency    amiodarone tablet 400 mg Daily    dextrose 50% injection 12.5 g PRN    dextrose 50% injection 25 g PRN    glucagon (human recombinant) injection 1 mg PRN    glucose chewable tablet 16 g PRN    glucose chewable tablet 24 g PRN    hydrALAZINE tablet 50 mg Q8H    insulin aspart U-100 pen 0-5 Units QID (AC + HS) PRN    potassium chloride CR capsule 30 mEq Q4H    potassium, sodium phosphates  280-160-250 mg packet 1 packet QID (AC & HS)    vancomycin 125 mg/5 mL oral solution 125 mg Q6H    venlafaxine tablet 37.5 mg Daily     Family History    None       Tobacco Use    Smoking status: Former     Current packs/day: 0.50     Types: Cigarettes    Smokeless tobacco: Not on file   Vaping Use    Vaping status: Never Used   Substance and Sexual Activity    Alcohol use: Yes     Comment: rarely    Drug use: No    Sexual activity: Not Currently     Partners: Female     Review of Systems   Constitutional:  Positive for appetite change and fatigue. Negative for chills and fever.   Respiratory:  Negative for cough and shortness of breath.    Cardiovascular:  Negative for chest pain, palpitations and leg swelling.   Gastrointestinal:  Positive for diarrhea. Negative for abdominal pain, nausea and vomiting.   Genitourinary:  Positive for decreased urine volume.   Skin: Negative.    Objective:     Vital Signs (Most Recent):  Temp: 99.4 °F (37.4 °C) (07/12/25 0752)  Pulse: 75 (07/12/25 0800)  Resp: 20 (07/12/25 0752)  BP: (!) 100/0 (07/12/25 0755)  SpO2: 100 % (07/12/25 0752) Vital Signs (24h Range):  Temp:  [98.2 °F (36.8 °C)-99.4 °F (37.4 °C)] 99.4 °F (37.4 °C)  Pulse:  [17-92] 75  Resp:  [12-20] 20  SpO2:  [79 %-100 %] 100 %  BP: ()/(0-95) 100/0     Weight: 75.8 kg (167 lb 1.7 oz) (07/11/25 1623)  Body mass index is 22.05 kg/m².  Body surface area is 1.98 meters squared.    I/O last 3 completed shifts:  In: 118 [P.O.:118]  Out: -      Physical Exam  Vitals and nursing note reviewed.   Eyes:      Conjunctiva/sclera: Conjunctivae normal.   Cardiovascular:      Comments: LVAD   Pulmonary:      Effort: Pulmonary effort is normal.      Breath sounds: Normal breath sounds.   Abdominal:      Palpations: Abdomen is soft.   Musculoskeletal:      Right lower leg: No edema.      Left lower leg: No edema.   Neurological:      Mental Status: He is alert and oriented to person, place, and time.   Psychiatric:         Mood and  Affect: Mood normal.         Behavior: Behavior normal.      Significant Labs:  CBC:   Recent Labs   Lab 07/12/25  0505   WBC 3.95   RBC 3.60*   HGB 10.3*   HCT 32.2*   *   MCV 89   MCH 28.6   MCHC 32.0     CMP:   Recent Labs   Lab 07/11/25  1812 07/12/25  0505 07/12/25  0913   GLU 99   < > 75   CALCIUM 7.5*   < > 7.8*   ALBUMIN 2.3*  --   --    PROT 6.2  --   --       < > 138   K 3.5   < > 2.8*   CO2 23   < > 24      < > 105   BUN 12   < > 16   CREATININE 3.3*   < > 4.1*   ALKPHOS 145  --   --    *  --   --    *  --   --    BILITOT 0.7  --   --     < > = values in this interval not displayed.     All labs within the past 24 hours have been reviewed.      Assessment/Plan:     Cardiac/Vascular  LVAD (left ventricular assist device) present  - per primary     Renal/  ESRD (end stage renal disease)  ESRD on iHD MWF  [facility] ([facility])  3 hours  EDW:  82.1 kg  [facility]DC  Last HD prior to presentation 7/11/25     Plan/Recommendations:  -No emergent need for RRT at this time. Resume MWF schedule 7/14  -plan to continue 3x week dialysis while IP.  -OK to continue regular diet. K 2.4 replete K and phos as needed   -1.5L fluid restrictions  -continue strict I/O's  -hold phos binder until Phos > 5     Anemia of ESRD  -On Micera as OP q 2 weeks;  defer VALENTIN to his OP dialysis unit  -PRN transfusion per primary team    GI  * Diarrhea of presumed infectious origin  - per primary         Thank you for your consult. I will follow-up with patient. Please contact us if you have any additional questions.    Yolanda Cai, RONI  Nephrology  [facility] - Cardiology Stepdown

## 2025-07-12 NOTE — CONSULTS
[facility] - Cardiology Stepdown  Cardiology  Consult Note    Patient Name: Radha Abbott  MRN: [id number]  Admission Date: 7/11/2025  Hospital Length of Stay: 1 days  Code Status: Full Code   Attending Provider: No att. providers found   Consulting Provider: Dougie Pandey MD  Primary Care Physician: Vasu Kong MD    Inpatient consult to Cardiology  Consult performed by: Dougie Pandey MD  Consult ordered by: Kelly Posadas MD  Reason for consult: See H&P      See H&P    Dougie Pandey MD  Cardiology   [facility] - Cardiology Stepdown

## 2025-07-12 NOTE — SUBJECTIVE & OBJECTIVE
Past Medical History:   Diagnosis Date    Aspiration pneumonia of both lungs 06/17/2024    CAD (coronary artery disease)     CHF (congestive heart failure)     Delirium 06/16/2024    Diabetes mellitus     HFrEF (heart failure with reduced ejection fraction)     Hypoglycemia 06/12/2024    ICD (implantable cardioverter-defibrillator) in place     LVAD (left ventricular assist device) present 12/01/2023    MI, old     PAF (paroxysmal atrial fibrillation) 10/11/2023    Stage 4 chronic kidney disease 02/15/2024    Type 2 diabetes mellitus without complication, without long-term current use of insulin 10/07/2023       Past Surgical History:   Procedure Laterality Date    ANGIOPLASTY-VENOUS ARTERY Right 12/1/2023    Procedure: ANGIOPLASTY-VENOUS ARTERY, RIGHT FEMORAL;  Surgeon: Yuri Washington MD;  [facility];  Service: Cardiovascular;  Laterality: Right;    AORTIC VALVULOPLASTY N/A 12/1/2023    Procedure: REPAIR, AORTIC VALVE;  Surgeon: Yuri Washington MD;  [facility];  Service: Cardiovascular;  Laterality: N/A;    CARDIAC SURGERY      CLOSURE N/A 12/1/2023    Procedure: CLOSURE, TEMPORARY;  Surgeon: Yuri Washington MD;  Location: [facility] OR [facility];  Service: Cardiovascular;  Laterality: N/A;    DRAINAGE OF PLEURAL EFFUSION  12/4/2023    Procedure: DRAINAGE, PLEURAL EFFUSION;  Surgeon: Yuri Washington MD;  Location: [facility] OR [facility];  Service: Cardiovascular;;    INSERTION OF GRAFT TO PERICARDIUM  12/4/2023    Procedure: INSERTION, GRAFT, PERICARDIUM;  Surgeon: Yuri Washington MD;  [facility];  Service: Cardiovascular;;    INSERTION OF INTRA-AORTIC BALLOON ASSIST DEVICE Right 11/21/2023    Procedure: INSERTION, INTRA-AORTIC BALLOON PUMP;  Surgeon: Finn Cohn MD;  Location: [facility] CATH LAB;  Service: Cardiology;  Laterality: Right;    LEFT VENTRICULAR ASSIST DEVICE Left 12/1/2023    Procedure: INSERTION-LEFT VENTRICULAR ASSIST DEVICE;  Surgeon: Yuri Washington MD;   Location: [address] FLR;  Service: Cardiovascular;  Laterality: Left;  REDO STERNOTOMY - REDO SAW NEEDED FOR CASE    LYSIS OF ADHESIONS  12/1/2023    Procedure: LYSIS, ADHESIONS;  Surgeon: Yuri Washington MD;  [address];  Service: Cardiovascular;;    PLACEMENT OF SWAN ROLANDO CATHETER WITH IMAGING GUIDANCE  11/20/2023    Procedure: INSERTION, CATHETER, SWAN-ROLANDO, WITH IMAGING GUIDANCE;  Surgeon: Sajan Hurley MD;  Location: [facility] CATH LAB;  Service: Cardiology;;    REMOVAL OF TUNNELED CENTRAL VENOUS CATHETER (CVC) N/A 3/1/2024    Procedure: REMOVAL, CATHETER, CENTRAL VENOUS, TUNNELED;  Surgeon: Seble Aguilar MD;  Location: [facility] CATH LAB;  Service: Interventional Nephrology;  Laterality: N/A;    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 12/1/2023    Procedure: REPAIR, ANEURYSM, ARTERY, FEMORAL;  Surgeon: Yuri Washington MD;  [address];  Service: Cardiovascular;  Laterality: Right;  Right Femoral Artery Repair    RIGHT HEART CATHETERIZATION Right 10/10/2023    Procedure: INSERTION, CATHETER, RIGHT HEART;  Surgeon: Bin Gandhi MD;  Location: [facility] CATH LAB;  Service: Cardiology;  Laterality: Right;    RIGHT HEART CATHETERIZATION Right 10/13/2023    Procedure: INSERTION, CATHETER, RIGHT HEART;  Surgeon: Walter Mcintrye MD;  Location: [facility] CATH LAB;  Service: Cardiology;  Laterality: Right;    RIGHT HEART CATHETERIZATION  11/13/2023    RIGHT HEART CATHETERIZATION Right 11/13/2023    Procedure: INSERTION, CATHETER, RIGHT HEART;  Surgeon: Juventino Bermudez Jr., MD;  Location: [facility] CATH LAB;  Service: Cardiology;  Laterality: Right;    RIGHT HEART CATHETERIZATION Right 11/20/2023    Procedure: INSERTION, CATHETER, RIGHT HEART;  Surgeon: Sajan Hurley MD;  Location: [facility] CATH LAB;  Service: Cardiology;  Laterality: Right;    RIGHT HEART CATHETERIZATION Right 1/22/2024    Procedure: INSERTION, CATHETER, RIGHT HEART;  Surgeon: Brayan Ocampo MD;  [facility];  Service: Cardiology;  Laterality: Right;    STERNAL WOUND CLOSURE N/A 12/4/2023    Procedure: CLOSURE, WOUND, STERNUM;  Surgeon: Yuri Washington MD;  [facility];  Service: Cardiovascular;  Laterality: N/A;    STERNOTOMY N/A 12/1/2023    Procedure: STERNOTOMY, REDO;  Surgeon: Yuri Washington MD;  [facility];  Service: Cardiovascular;  Laterality: N/A;    VALVULOPLASTY, MITRAL VALVE N/A 12/1/2023    Procedure: VALVULOPLASTY, MITRAL VALVE;  Surgeon: Yuri Washington MD;  [facility];  Service: Cardiovascular;  Laterality: N/A;       Review of patient's allergies indicates:  No Known Allergies    No current facility-administered medications on file prior to encounter.     Current Outpatient Medications on File Prior to Encounter   Medication Sig    amiodarone (PACERONE) 400 MG tablet Take 1 tablet (400 mg total) by mouth once daily.    atorvastatin (LIPITOR) 40 MG tablet Take 1 tablet (40 mg total) by mouth every evening.    hydrALAZINE (APRESOLINE) 50 MG tablet Take 1 tablet (50 mg total) by mouth every 8 (eight) hours.    omega 3-dha-epa-fish oil (FISH OIL) 1,000 (120-180) mg Cap Take 1 capsule by mouth once daily.    potassium, sodium phosphates (PHOS-NAK) 280-160-250 mg PwPk Take 1 packet by mouth 4 (four) times daily before meals and nightly.    pregabalin (LYRICA) 100 MG capsule Take 1 capsule by mouth every other day.    psyllium husk (METAMUCIL) 3.4 gram PwPk packet Take 2 packets by mouth 2 (two) times daily.    pulse oximeter (PULSE OXIMETER) device by Apply Externally route 2 (two) times a day. Use twice daily at 8 AM and 3 PM and [facility].    traZODone (DESYREL) 50 MG tablet Take 0.5 tablets (25 mg total) by mouth every evening.    vancomycin 25 mg/mL oral solution Take 5 mLs (125 mg total) 2 (two) times a day until 5/27, THEN 5 mLs (125 mg total) once daily for 7 days, THEN 5 mLs (125 mg total) every other day for 14 days.     venlafaxine (EFFEXOR) 37.5 MG Tab Take 1 tablet (37.5 mg total) by mouth once daily.    vitamin D (VITAMIN D3) 1000 units Tab Take 1 tablet by mouth once daily    warfarin (COUMADIN) 1 MG tablet Take 1 tablet (1 mg total) by mouth Daily. Or as directed by coumadin clinic     Family History    None       Tobacco Use    Smoking status: Former     Current packs/day: 0.50     Types: Cigarettes    Smokeless tobacco: Not on file   Vaping Use    Vaping status: Never Used   Substance and Sexual Activity    Alcohol use: Yes     Comment: rarely    Drug use: No    Sexual activity: Not Currently     Partners: Female     Review of Systems   Constitutional: Positive for malaise/fatigue. Negative for chills and fever.   HENT:  Negative for congestion.    Eyes:  Negative for blurred vision and visual disturbance.   Cardiovascular:  Negative for chest pain, dyspnea on exertion, irregular heartbeat, leg swelling, near-syncope, orthopnea, palpitations, paroxysmal nocturnal dyspnea and syncope.   Respiratory:  Negative for cough, shortness of breath, sputum production and wheezing.    Skin:  Negative for color change.   Musculoskeletal:  Negative for arthritis and muscle weakness.   Gastrointestinal:  Positive for change in bowel habit and diarrhea. Negative for bloating, abdominal pain, nausea and vomiting.   Genitourinary:  Negative for dysuria.   Neurological:  Negative for dizziness and light-headedness.   Psychiatric/Behavioral:  The patient is not nervous/anxious.      Objective:     Vital Signs (Most Recent):  Temp: 98.3 °F (36.8 °C) (07/12/25 0401)  Pulse: 92 (07/12/25 0602)  Resp: 19 (07/12/25 0401)  BP: (!) 78/0 (07/12/25 0407)  SpO2: 96 % (07/12/25 0401) Vital Signs (24h Range):  Temp:  [98.2 °F (36.8 °C)-98.6 °F (37 °C)] 98.3 °F (36.8 °C)  Pulse:  [17-92] 92  Resp:  [12-19] 19  SpO2:  [79 %-97 %] 96 %  BP: ()/(0-95) 78/0     Weight: 75.8 kg (167 lb 1.7 oz)  Body mass index is 22.05 kg/m².    SpO2: 96 %          Intake/Output Summary (Last 24 hours) at 7/12/2025 0639  Last data filed at 7/12/2025 0243  Gross per 24 hour   Intake 118 ml   Output --   Net 118 ml       Lines/Drains/Airways       Central Venous Catheter Line  Duration                  Hemodialysis Catheter 06/24/24 1440 right internal jugular 382 days              Line  Duration                  VAD 12/01/23 1015 Left ventricular assist device HeartMate 3 588 days              Peripheral Intravenous Line  Duration             Peripheral IV 07/11/25 1632 20 G Right Hand <1 day    Peripheral IV Single Lumen 07/11/25 1957 20 G Anterior;Right Upper Arm <1 day                     Physical Exam  Constitutional:       General: He is not in acute distress.     Appearance: Normal appearance. He is not ill-appearing.   HENT:      Head: Normocephalic and atraumatic.      Mouth/Throat:      Mouth: Mucous membranes are moist.   Eyes:      Extraocular Movements: Extraocular movements intact.      Pupils: Pupils are equal, round, and reactive to light.   Neck:      Comments: JVD negative bilateral  Cardiovascular:      Rate and Rhythm: Normal rate and regular rhythm.      Heart sounds: No murmur heard.     No friction rub. No gallop.      Comments: VAD hum +  Pulmonary:      Effort: No respiratory distress.      Breath sounds: No rales.   Abdominal:      General: Bowel sounds are normal. There is no distension.      Tenderness: There is no abdominal tenderness.   Musculoskeletal:      Cervical back: No rigidity.      Right lower leg: Edema present.      Left lower leg: Edema present.   Skin:     General: Skin is warm.      Capillary Refill: Capillary refill takes less than 2 seconds.   Neurological:      General: No focal deficit present.      Mental Status: He is alert and oriented to person, place, and time.   Psychiatric:         Mood and Affect: Mood normal.         Behavior: Behavior normal.          Significant Labs: BMP:   Recent Labs   Lab 07/11/25  4470  "07/12/25  0505   GLU 99 74    136   K 3.5 2.4*    104   CO2 23 25   BUN 12 15   CREATININE 3.3* 4.0*   CALCIUM 7.5* 7.2*   MG 1.7 1.6   , CMP   Recent Labs   Lab 07/11/25  1812 07/12/25  0505    136   K 3.5 2.4*    104   CO2 23 25   GLU 99 74   BUN 12 15   CREATININE 3.3* 4.0*   CALCIUM 7.5* 7.2*   PROT 6.2  --    ALBUMIN 2.3*  --    BILITOT 0.7  --    ALKPHOS 145  --    *  --    *  --    ANIONGAP 7* 7*   , CBC   Recent Labs   Lab 07/11/25  1634 07/12/25  0505   WBC 4.64 3.95   HGB 12.9* 10.3*   HCT 40.8 32.2*   * 108*   , INR   Recent Labs   Lab 07/10/25  0717 07/11/25  1634 07/12/25  0505   INR 3.3* 3.4* 3.4*   PROTIME 34.5* 34.1* 33.9*   , Lipid Panel No results for input(s): "CHOL", "HDL", "LDLCALC", "TRIG", "CHOLHDL" in the last 48 hours., and Troponin   Recent Labs   Lab 07/11/25  1634   TROPONINIHS 51*       Significant Imaging: Echocardiogram: 2D echo with color flow doppler: No results found. However, due to the size of the patient record, not all encounters were searched. Please check Results Review for a complete set of results. and Transthoracic echo (TTE) complete (Cupid Only):   Results for orders placed or performed during the hospital encounter of 04/02/25   Echo   Result Value Ref Range    LV Diastolic Volume 118 mL    Echo EF Estimated 8 %    LV Systolic Volume 108 mL    IVS 0.9 0.6 - 1.1 cm    LVIDd 5.0 3.5 - 6.0 cm    LVIDs 4.8 (A) 2.1 - 4.0 cm    LVOT diameter 2.1 cm    PW 0.9 0.6 - 1.1 cm    RV S' 6.15 cm/s    LA size 3.9 cm    Left Atrium Major Axis 5.3 cm    Left Atrium Minor Axis 4.3 cm    LA Vol (MOD) 59 mL    MV Peak A Zeferino 1.01 m/s    E wave deceleration time 263 ms    MV Peak E Zeferino 0.70 m/s    E/A ratio 0.69     LV LATERAL E/E' RATIO 14.0     LV SEPTAL E/E' RATIO 17.5     TDI LATERAL 0.05 m/s    TDI SEPTAL 0.04 m/s    TV peak gradient 20 mmHg    TR Max Zeferino 2.2 m/s    Ascending aorta 3.07 cm    STJ 2.83 cm    Sinus 3.27 cm    LA WIDTH 3.6 cm    " TAPSE 1.23 cm    BSA 2.04 m2    LV Systolic Volume Index 52.7 mL/m2    LV Diastolic Volume Index 57.56 mL/m2    LVOT area 3.5 cm2    FS 4.0 (A) 28 - 44 %    Left Ventricle Relative Wall Thickness 0.36 cm    LV mass 158.2 g    LV Mass Index 77.2 g/m2    E/E' ratio 16 m/s    KAVON 28 mL/m2    LA Vol 57 cm3    KAVON (MOD) 29 mL/m2    Mean e' 0.05 m/s    ZLVIDS 1.92     ZLVIDD -2.07     TV resting pulmonary artery pressure 27 mmHg    RV TB RVSP 10 mmHg    Est. RA pres 8 mmHg    IVC diameter 2.4 cm    LVAD  Rate 5,000 rpm    Narrative      LVAD: There is a Heartmate III LVAD running at 5,000 RPM.    Left Ventricle: The left ventricle is normal in size. Normal wall   thickness. Global hypokinesis present. There is severely reduced systolic   function with a visually estimated ejection fraction of 10 -15%. Unable to   assess diastolic function due to LVAD.    Right Ventricle: The right ventricle is normal in size. Right ventricle   wall motion has global hypokinesis. Systolic function is mildly reduced.   Pacemaker lead present in the ventricle.    Left Atrium: Left atrium is dilated.    Aortic Valve: The aortic valve repaired.    Mitral Valve: The mitral valve is repaired with an Noble stitch.    Pulmonary Artery: The estimated pulmonary artery systolic pressure is   27 mmHg.    IVC/SVC: Intermediate venous pressure at 8 mmHg.

## 2025-07-12 NOTE — PROGRESS NOTES
07/12/2025  [place]    Current provider:  No att. providers found    Device interrogation:      7/12/2025     7:58 AM 7/12/2025     4:06 AM 7/12/2025    12:18 AM 7/11/2025     9:49 PM 7/11/2025     4:37 PM 6/11/2025    11:32 AM 5/26/2025    12:00 PM   TXP LVAD INTERROGATIONS   Type HeartMate3 HeartMate3 HeartMate3 HeartMate3 HeartMate3  HeartMate3   Flow 3.2 3.2 3.1 2.7 2.8  3.9   Speed 5000 5000 5000 5000 5100  5000   PI 7.6 8.3 8.3 11.2 11.2  4.4   Power ([place]) 3.5 3.4 3.4 3.5 3.6  3.4   LSL 4600 4600 4600 4600   4600   Pulsatility Intermittent pulse Intermittent pulse Intermittent pulse Pulse Pulse Intermittent pulse Pulse          Rounded on [facility] to ensure all mechanical assist device settings (IABP or VAD) were appropriate and all parameters were within limits.  I was able to ensure all back up equipment was present, the staff had no issues, and the Perfusion Department daily rounding was complete.      For implantable VADs: Interrogation of Ventricular assist device was performed with analysis of device parameters and review of device function. I have personally reviewed the interrogation findings and agree with findings as stated.     In emergency, the nursing units have been notified to contact the perfusion department either by:  Calling [phone] from 630am to 4pm Mon thru Fri, utilizing the On-Call Finder functionality of Epic and searching for Perfusion, or by contacting the hospital  from 4pm to 630am and on weekends and asking to speak with the perfusionist on call.    9:50 AM

## 2025-07-12 NOTE — ASSESSMENT & PLAN NOTE
Check C diff  Started vancomycin 125 mg per oral every 6 hours  Infectious disease consult requested

## 2025-07-12 NOTE — SUBJECTIVE & OBJECTIVE
Past Medical History:   Diagnosis Date    Aspiration pneumonia of both lungs 06/17/2024    CAD (coronary artery disease)     CHF (congestive heart failure)     Delirium 06/16/2024    Diabetes mellitus     HFrEF (heart failure with reduced ejection fraction)     Hypoglycemia 06/12/2024    ICD (implantable cardioverter-defibrillator) in place     LVAD (left ventricular assist device) present 12/01/2023    MI, old     PAF (paroxysmal atrial fibrillation) 10/11/2023    Stage 4 chronic kidney disease 02/15/2024    Type 2 diabetes mellitus without complication, without long-term current use of insulin 10/07/2023       Past Surgical History:   Procedure Laterality Date    ANGIOPLASTY-VENOUS ARTERY Right 12/1/2023    Procedure: ANGIOPLASTY-VENOUS ARTERY, RIGHT FEMORAL;  Surgeon: Yuri Washington MD;  [facility];  Service: Cardiovascular;  Laterality: Right;    AORTIC VALVULOPLASTY N/A 12/1/2023    Procedure: REPAIR, AORTIC VALVE;  Surgeon: Yuri Washington MD;  [facility];  Service: Cardiovascular;  Laterality: N/A;    CARDIAC SURGERY      CLOSURE N/A 12/1/2023    Procedure: CLOSURE, TEMPORARY;  Surgeon: Yuri Washington MD;  Location: [facility] OR [facility] FLR;  Service: Cardiovascular;  Laterality: N/A;    DRAINAGE OF PLEURAL EFFUSION  12/4/2023    Procedure: DRAINAGE, PLEURAL EFFUSION;  Surgeon: Yuri Washington MD;  Location: [facility] OR [facility] FLR;  Service: Cardiovascular;;    INSERTION OF GRAFT TO PERICARDIUM  12/4/2023    Procedure: INSERTION, GRAFT, PERICARDIUM;  Surgeon: Yuri Washington MD;  [facility];  Service: Cardiovascular;;    INSERTION OF INTRA-AORTIC BALLOON ASSIST DEVICE Right 11/21/2023    Procedure: INSERTION, INTRA-AORTIC BALLOON PUMP;  Surgeon: Finn Cohn MD;  Location: [facility] CATH LAB;  Service: Cardiology;  Laterality: Right;    LEFT VENTRICULAR ASSIST DEVICE Left 12/1/2023    Procedure: INSERTION-LEFT VENTRICULAR ASSIST DEVICE;  Surgeon: Yuri Washington MD;   Location: [address] FLR;  Service: Cardiovascular;  Laterality: Left;  REDO STERNOTOMY - REDO SAW NEEDED FOR CASE    LYSIS OF ADHESIONS  12/1/2023    Procedure: LYSIS, ADHESIONS;  Surgeon: Yuri Washington MD;  [address]  Service: Cardiovascular;;    PLACEMENT OF SWAN ROLANDO CATHETER WITH IMAGING GUIDANCE  11/20/2023    Procedure: INSERTION, CATHETER, SWAN-ROLANDO, WITH IMAGING GUIDANCE;  Surgeon: Sajan Hurley MD;  Location: [facility] CATH LAB;  Service: Cardiology;;    REMOVAL OF TUNNELED CENTRAL VENOUS CATHETER (CVC) N/A 3/1/2024    Procedure: REMOVAL, CATHETER, CENTRAL VENOUS, TUNNELED;  Surgeon: Seble Aguilar MD;  Location: [facility] CATH LAB;  Service: Interventional Nephrology;  Laterality: N/A;    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 12/1/2023    Procedure: REPAIR, ANEURYSM, ARTERY, FEMORAL;  Surgeon: Yuri Washington MD;  [address]  Service: Cardiovascular;  Laterality: Right;  Right Femoral Artery Repair    RIGHT HEART CATHETERIZATION Right 10/10/2023    Procedure: INSERTION, CATHETER, RIGHT HEART;  Surgeon: Bin Gandhi MD;  Location: [facility] CATH LAB;  Service: Cardiology;  Laterality: Right;    RIGHT HEART CATHETERIZATION Right 10/13/2023    Procedure: INSERTION, CATHETER, RIGHT HEART;  Surgeon: Walter Mcintyre MD;  Location: [facility] CATH LAB;  Service: Cardiology;  Laterality: Right;    RIGHT HEART CATHETERIZATION  11/13/2023    RIGHT HEART CATHETERIZATION Right 11/13/2023    Procedure: INSERTION, CATHETER, RIGHT HEART;  Surgeon: Juventino Bermudez Jr., MD;  Location: [facility] CATH LAB;  Service: Cardiology;  Laterality: Right;    RIGHT HEART CATHETERIZATION Right 11/20/2023    Procedure: INSERTION, CATHETER, RIGHT HEART;  Surgeon: Sajan Hurley MD;  Location: [facility] CATH LAB;  Service: Cardiology;  Laterality: Right;    RIGHT HEART CATHETERIZATION Right 1/22/2024    Procedure: INSERTION, CATHETER, RIGHT HEART;  Surgeon: Brayan Ocampo MD;  Location: [facility]  LAB;  Service: Cardiology;  Laterality: Right;    STERNAL WOUND CLOSURE N/A 12/4/2023    Procedure: CLOSURE, WOUND, STERNUM;  Surgeon: Yuri Washington MD;  Location: [facility] OR 2ND FLR;  Service: Cardiovascular;  Laterality: N/A;    STERNOTOMY N/A 12/1/2023    Procedure: STERNOTOMY, REDO;  Surgeon: Yuri Washington MD;  Location: [facility] OR [facility] FLR;  Service: Cardiovascular;  Laterality: N/A;    VALVULOPLASTY, MITRAL VALVE N/A 12/1/2023    Procedure: VALVULOPLASTY, MITRAL VALVE;  Surgeon: Yuri Washington MD;  Location: [facility] OR [facility] FLR;  Service: Cardiovascular;  Laterality: N/A;       Review of patient's allergies indicates:  No Known Allergies    Medications:  Medications Prior to Admission   Medication Sig    amiodarone (PACERONE) 400 MG tablet Take 1 tablet (400 mg total) by mouth once daily.    atorvastatin (LIPITOR) 40 MG tablet Take 1 tablet (40 mg total) by mouth every evening.    hydrALAZINE (APRESOLINE) 50 MG tablet Take 1 tablet (50 mg total) by mouth every 8 (eight) hours.    omega 3-dha-epa-fish oil (FISH OIL) 1,000 (120-180) mg Cap Take 1 capsule by mouth once daily.    potassium, sodium phosphates (PHOS-NAK) 280-160-250 mg PwPk Take 1 packet by mouth 4 (four) times daily before meals and nightly.    pregabalin (LYRICA) 100 MG capsule Take 1 capsule by mouth every other day.    psyllium husk (METAMUCIL) 3.4 gram PwPk packet Take 2 packets by mouth 2 (two) times daily.    pulse oximeter (PULSE OXIMETER) device by Apply Externally route 2 (two) times a day. Use twice daily at 8 AM and 3 PM and [facility] as [facility].    traZODone (DESYREL) 50 MG tablet Take 0.5 tablets (25 mg total) by mouth every evening.    vancomycin 25 mg/mL oral solution Take 5 mLs (125 mg total) 2 (two) times a day until 5/27, THEN 5 mLs (125 mg total) once daily for 7 days, THEN 5 mLs (125 mg total) every other day for 14 days.    venlafaxine (EFFEXOR) 37.5 MG Tab Take 1 tablet (37.5 mg total) by mouth once daily.     vitamin D (VITAMIN D3) 1000 units Tab Take 1 tablet by mouth once daily    warfarin (COUMADIN) 1 MG tablet Take 1 tablet (1 mg total) by mouth Daily. Or as directed by coumadin clinic     Antibiotics (From admission, onward)      Start     Stop Route Frequency Ordered    07/12/25 0000  vancomycin 125 mg/5 mL oral solution 125 mg  (C. difficile Infection (CDI) Treatment Order Panel)         -- Oral Every 6 hours 07/11/25 1838          Antifungals (From admission, onward)      None          Antivirals (From admission, onward)      None             Immunization History   Administered Date(s) Administered    COVID-19 MRNA, LN-S PF (MODERNA HALF 0.25 ML DOSE) 12/13/2021    COVID-19, MRNA, LN-S, PF (MODERNA FULL 0.5 ML DOSE) 03/18/2021, 04/15/2021    DTaP 06/27/2012    Hepatitis A, Adult 11/17/2023    Hepatitis B (recombinant) Adjuvanted, 2 dose 09/03/2023, 11/17/2023    Influenza - Quadrivalent 11/09/2021    PPD Test 12/22/2023    Pneumococcal Conjugate - 20 Valent 09/03/2023    RSVpreF (Arexvy) 09/03/2023    Tdap 06/27/2012, 09/03/2023    Zoster Recombinant 09/03/2023       Family History    None       Social History     Socioeconomic History    Marital status:    Tobacco Use    Smoking status: Former     Current packs/day: 0.50     Types: Cigarettes   Vaping Use    Vaping status: Never Used   Substance and Sexual Activity    Alcohol use: Yes     Comment: rarely    Drug use: No    Sexual activity: Not Currently     Partners: Female     Social Drivers of Health     Financial Resource Strain: Low Risk  (7/12/2025)    Overall Financial Resource Strain (CARDIA)     Difficulty of Paying Living Expenses: Not hard at all   Food Insecurity: No Food Insecurity (7/12/2025)    Hunger Vital Sign     Worried About Running Out of Food in the Last Year: Never true     Ran Out of Food in the Last Year: Never true   Transportation Needs: No Transportation Needs (7/12/2025)    PRAPARE - Transportation     Lack of Transportation  (Medical): No     Lack of Transportation (Non-Medical): No   Recent Concern: Transportation Needs - Unmet Transportation Needs (5/24/2025)    PRAPARE - Transportation     Lack of Transportation (Medical): No     Lack of Transportation (Non-Medical): Yes   Physical Activity: Unknown (2/29/2024)    Exercise Vital Sign     Days of Exercise per Week: 2 days   Recent Concern: Physical Activity - Inactive (2/29/2024)    Exercise Vital Sign     Days of Exercise per Week: 2 days     Minutes of Exercise per Session: 0 min   Stress: Stress Concern Present (7/12/2025)    [facility] [facility] - Occupational Stress Questionnaire     Feeling of Stress : To some extent   Housing Stability: Low Risk  (7/12/2025)    Housing Stability Vital Sign     Unable to Pay for Housing in the Last Year: No     Homeless in the Last Year: No     Review of Systems   Gastrointestinal:  Positive for diarrhea.   All other systems reviewed and are negative.    Objective:     Vital Signs (Most Recent):  Temp: 98.1 °F (36.7 °C) (07/12/25 1045)  Pulse: 103 (07/12/25 1055)  Resp: 16 (07/12/25 1045)  BP: (!) 84/0 (07/12/25 1045)  SpO2: 100 % (07/12/25 0752) Vital Signs (24h Range):  Temp:  [98.1 °F (36.7 °C)-99.4 °F (37.4 °C)] 98.1 °F (36.7 °C)  Pulse:  [] 103  Resp:  [12-20] 16  SpO2:  [79 %-100 %] 100 %  BP: ()/(0-95) 84/0     Weight: 75.8 kg (167 lb 1.7 oz)  Body mass index is 22.05 kg/m².    Estimated Creatinine Clearance: 19.8 mL/min (A) (based on SCr of 4.1 mg/dL (H)).     Physical Exam  Vitals and nursing note reviewed.   Constitutional:       Appearance: Normal appearance.   HENT:      Head: Normocephalic and atraumatic.   Eyes:      General: No scleral icterus.        Right eye: No discharge.         Left eye: No discharge.   Pulmonary:      Effort: Pulmonary effort is normal.      Breath sounds: Normal breath sounds.   Abdominal:      General: There is no distension.      Palpations: Abdomen is soft.       Tenderness: There is no abdominal tenderness.      Comments: Dressings covering LVAD exit site.   Skin:     General: Skin is warm.   Neurological:      General: No focal deficit present.      Mental Status: He is alert and oriented to person, place, and time.          Significant Labs:   Microbiology Results (last 7 days)       Procedure Component Value Units Date/Time    Clostridium difficile EIA [[id number]] Collected: 07/12/25 0246    Order Status: Sent Specimen: Stool Updated: 07/12/25 0935    Blood culture [[id number]] Collected: 07/11/25 1953    Order Status: Sent Specimen: Blood from Peripheral, Upper Arm, Right Updated: 07/11/25 2220    Blood culture [[id number]] Collected: 07/11/25 1953    Order Status: Sent Specimen: Blood from Peripheral, Antecubital, Right Updated: 07/11/25 2220    Influenza A & B by Molecular [[id number]]  (Normal) Collected: 07/11/25 1634    Order Status: Completed Specimen: Nasal Swab Updated: 07/11/25 1841     INFLUENZA A MOLECULAR Negative     INFLUENZA B MOLECULAR  Negative    Culture, Respiratory with Gram Stain [[id number]]     Order Status: Sent Specimen: Respiratory             Significant Imaging: I have reviewed all pertinent imaging results/findings within the past 24 hours.

## 2025-07-12 NOTE — CONSULTS
[facility] - Cardiology Stepdown  Infectious Disease  Consult Note    Patient Name: Radha Abbott  MRN: [id number]  Admission Date: 7/11/2025  Hospital Length of Stay: 1 days  Attending Physician: No att. providers found  Primary Care Provider: Vasu Kong MD     Isolation Status: No active isolations    Patient information was obtained from patient, spouse/SO, past medical records, and ER records.      Inpatient consult to Infectious Diseases  Consult performed by: Axel Caban MD  Consult ordered by: Dougie Pandey MD        Assessment/Plan:     GI  * Diarrhea of presumed infectious origin  65 year old male with a history stage D HFrEF 2/2 ICM s/p HM3 as DT (12/1/2023), ESRD on HD (MWF), and clostridium difficile colitis for which was received treatment followed by a slow taper on oral vancomycin.  Shortly after completing his oral vancomycin taper, the patient developed diarrhea again.  Stool studies obtained on 7/10 were negative for C diff.  Patient presented for admission for evaluation of his ongoing diarrhea.    Repeat C diff testing ordered and is pending but I suspect this will be negative again.  Differential diagnosis includes continued disruption/dysregulation of his gut microbiome vs post-infectious irritable bowel syndrome.  Follow up on repeat stool studies.  If C diff and other infections are ruled out, then may consider anti-diarrhea agents like lomotil.  Consider starting the patient on probiotics.        Thank you for your consult. I will follow-up with patient. Please contact us if you have any additional questions.    Axel Caban MD  Infectious Disease  [facility] - [facility]iology Step[facility]    Time: 75 minutes   50% of time spent on face-to-face counseling and coordination of care. Counseling included review of test results, diagnosis, and treatment plan with patient and/or family.  I have reviewed hospital notes from cardiology service and other specialty providers as well as outside  medical records. I have also reviewed CBC, CMP/BMP,  cultures and imaging with my interpretation as documented. Patient is high risk of morbidity, on antibiotics requiring intensive monitoring for toxicity.       Subjective:     Principal Problem: Diarrhea of presumed infectious origin    HPI: 65 year old male with a history stage D HFrEF 2/2 ICM s/p HM3 as DT (12/1/2023), ESRD on HD (MWF), and clostridium difficile colitis for which was received treatment followed by a slow taper on oral vancomycin.  Shortly after completing his oral vancomycin taper, the patient developed diarrhea again.  Stool studies obtained on 7/10 were negative for C diff.  Patient presented for admission for evaluation of his ongoing diarrhea.    Past Medical History:   Diagnosis Date    Aspiration pneumonia of both lungs 06/17/2024    CAD (coronary artery disease)     CHF (congestive heart failure)     Delirium 06/16/2024    Diabetes mellitus     HFrEF (heart failure with reduced ejection fraction)     Hypoglycemia 06/12/2024    ICD (implantable cardioverter-defibrillator) in place     LVAD (left ventricular assist device) present 12/01/2023    MI, old     PAF (paroxysmal atrial fibrillation) 10/11/2023    Stage 4 chronic kidney disease 02/15/2024    Type 2 diabetes mellitus without complication, without long-term current use of insulin 10/07/2023       Past Surgical History:   Procedure Laterality Date    ANGIOPLASTY-VENOUS ARTERY Right 12/1/2023    Procedure: ANGIOPLASTY-VENOUS ARTERY, RIGHT FEMORAL;  Surgeon: Yuri Washington MD;  Location: [facility] OR [address] [place] [address];  Service: Cardiovascular;  Laterality: Right;    AORTIC VALVULOPLASTY N/A 12/1/2023    Procedure: REPAIR, AORTIC VALVE;  Surgeon: Yuri Washington MD;  Location: [facility] OR [address] [place] [address];  Service: Cardiovascular;  Laterality: N/A;    CARDIAC SURGERY      CLOSURE N/A 12/1/2023    Procedure: CLOSURE, TEMPORARY;  Surgeon: Yuri Washington MD;  Location: [facility] OR [address] [place] [address];  Service: Cardiovascular;   Laterality: N/A;    DRAINAGE OF PLEURAL EFFUSION  12/4/2023    Procedure: DRAINAGE, PLEURAL EFFUSION;  Surgeon: Yuri Washington MD;  [facility];  Service: Cardiovascular;;    INSERTION OF GRAFT TO PERICARDIUM  12/4/2023    Procedure: INSERTION, GRAFT, PERICARDIUM;  Surgeon: Yuri Washington MD;  [facility];  Service: Cardiovascular;;    INSERTION OF INTRA-AORTIC BALLOON ASSIST DEVICE Right 11/21/2023    Procedure: INSERTION, INTRA-AORTIC BALLOON PUMP;  Surgeon: Finn Cohn MD;  Location: [facility] CATH LAB;  Service: Cardiology;  Laterality: Right;    LEFT VENTRICULAR ASSIST DEVICE Left 12/1/2023    Procedure: INSERTION-LEFT VENTRICULAR ASSIST DEVICE;  Surgeon: Yuri Washington MD;  [facility];  Service: Cardiovascular;  Laterality: Left;  REDO STERNOTOMY - REDO SAW NEEDED FOR CASE    LYSIS OF ADHESIONS  12/1/2023    Procedure: LYSIS, ADHESIONS;  Surgeon: Yuri Washington MD;  [facility];  Service: Cardiovascular;;    PLACEMENT OF SWAN ROLANDO CATHETER WITH IMAGING GUIDANCE  11/20/2023    Procedure: INSERTION, CATHETER, SWAN-ROLANDO, WITH IMAGING GUIDANCE;  Surgeon: Sajan Hurley MD;  Location: [facility] CATH LAB;  Service: Cardiology;;    REMOVAL OF TUNNELED CENTRAL VENOUS CATHETER (CVC) N/A 3/1/2024    Procedure: REMOVAL, CATHETER, CENTRAL VENOUS, TUNNELED;  Surgeon: Seble Aguilar MD;  Location: [facility] CATH LAB;  Service: Interventional Nephrology;  Laterality: N/A;    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 12/1/2023    Procedure: REPAIR, ANEURYSM, ARTERY, FEMORAL;  Surgeon: Yuri Washington MD;  [facility];  Service: Cardiovascular;  Laterality: Right;  Right Femoral Artery Repair    RIGHT HEART CATHETERIZATION Right 10/10/2023    Procedure: INSERTION, CATHETER, RIGHT HEART;  Surgeon: Bin Gandhi MD;  Location: [facility] CATH LAB;  Service: Cardiology;  Laterality: Right;    RIGHT HEART CATHETERIZATION Right 10/13/2023    Procedure:  INSERTION, CATHETER, RIGHT HEART;  Surgeon: Walter Mcintyre MD;  Location: [facility] CATH LAB;  Service: Cardiology;  Laterality: Right;    RIGHT HEART CATHETERIZATION  11/13/2023    RIGHT HEART CATHETERIZATION Right 11/13/2023    Procedure: INSERTION, CATHETER, RIGHT HEART;  Surgeon: Juventino Bermudez Jr., MD;  Location: [facility] CATH LAB;  Service: Cardiology;  Laterality: Right;    RIGHT HEART CATHETERIZATION Right 11/20/2023    Procedure: INSERTION, CATHETER, RIGHT HEART;  Surgeon: Sajan Hurley MD;  Location: [facility] CATH LAB;  Service: Cardiology;  Laterality: Right;    RIGHT HEART CATHETERIZATION Right 1/22/2024    Procedure: INSERTION, CATHETER, RIGHT HEART;  Surgeon: Brayan Ocampo MD;  Location: [facility] CATH LAB;  Service: Cardiology;  Laterality: Right;    STERNAL WOUND CLOSURE N/A 12/4/2023    Procedure: CLOSURE, WOUND, STERNUM;  Surgeon: Yuri Washington MD;  Location: [facility] OR [facility];  Service: Cardiovascular;  Laterality: N/A;    STERNOTOMY N/A 12/1/2023    Procedure: STERNOTOMY, REDO;  Surgeon: Yuri Washington MD;  Location: [facility] OR [facility];  Service: Cardiovascular;  Laterality: N/A;    VALVULOPLASTY, MITRAL VALVE N/A 12/1/2023    Procedure: VALVULOPLASTY, MITRAL VALVE;  Surgeon: Yuri Washington MD;  Location: [facility] OR [facility];  Service: Cardiovascular;  Laterality: N/A;       Review of patient's allergies indicates:  No Known Allergies    Medications:  Medications Prior to Admission   Medication Sig    amiodarone (PACERONE) 400 MG tablet Take 1 tablet (400 mg total) by mouth once daily.    atorvastatin (LIPITOR) 40 MG tablet Take 1 tablet (40 mg total) by mouth every evening.    hydrALAZINE (APRESOLINE) 50 MG tablet Take 1 tablet (50 mg total) by mouth every 8 (eight) hours.    omega 3-dha-epa-fish oil (FISH OIL) 1,000 (120-180) mg Cap Take 1 capsule by mouth once daily.    potassium, sodium phosphates (PHOS-NAK) 280-160-250 mg PwPk Take 1 packet by mouth 4 (four) times daily before meals and nightly.     pregabalin (LYRICA) 100 MG capsule Take 1 capsule by mouth every other day.    psyllium husk (METAMUCIL) 3.4 gram PwPk packet Take 2 packets by mouth 2 (two) times daily.    pulse oximeter (PULSE OXIMETER) device by Apply Externally route 2 (two) times a day. Use twice daily at 8 AM and 3 PM and record the value in MyChart as directed.    traZODone (DESYREL) 50 MG tablet Take 0.5 tablets (25 mg total) by mouth every evening.    vancomycin 25 mg/mL oral solution Take 5 mLs (125 mg total) 2 (two) times a day until 5/27, THEN 5 mLs (125 mg total) once daily for 7 days, THEN 5 mLs (125 mg total) every other day for 14 days.    venlafaxine (EFFEXOR) 37.5 MG Tab Take 1 tablet (37.5 mg total) by mouth once daily.    vitamin D (VITAMIN D3) 1000 units Tab Take 1 tablet by mouth once daily    warfarin (COUMADIN) 1 MG tablet Take 1 tablet (1 mg total) by mouth Daily. Or as directed by coumadin clinic     Antibiotics (From admission, onward)      Start     Stop Route Frequency Ordered    07/12/25 0000  vancomycin 125 mg/5 mL oral solution 125 mg  (C. difficile Infection (CDI) Treatment Order Panel)         -- Oral Every 6 hours 07/11/25 1838          Antifungals (From admission, onward)      None          Antivirals (From admission, onward)      None             Immunization History   Administered Date(s) Administered    COVID-19 MRNA, LN-S PF (MODERNA HALF 0.25 ML DOSE) 12/13/2021    COVID-19, MRNA, LN-S, PF (MODERNA FULL 0.5 ML DOSE) 03/18/2021, 04/15/2021    DTaP 06/27/2012    Hepatitis A, Adult 11/17/2023    Hepatitis B (recombinant) Adjuvanted, 2 dose 09/03/2023, 11/17/2023    Influenza - Quadrivalent 11/09/2021    PPD Test 12/22/2023    Pneumococcal Conjugate - 20 Valent 09/03/2023    RSVpreF (Arexvy) 09/03/2023    Tdap 06/27/2012, 09/03/2023    Zoster Recombinant 09/03/2023       Family History    None       Social History     Socioeconomic History    Marital status:    Tobacco Use    Smoking status: Former      Current packs/day: 0.50     Types: Cigarettes   Vaping Use    Vaping status: Never Used   Substance and Sexual Activity    Alcohol use: Yes     Comment: rarely    Drug use: No    Sexual activity: Not Currently     Partners: Female     Social Drivers of Health     Financial Resource Strain: Low Risk  (7/12/2025)    Overall Financial Resource Strain (CARDIA)     Difficulty of Paying Living Expenses: Not hard at all   Food Insecurity: No Food Insecurity (7/12/2025)    Hunger Vital Sign     Worried About Running Out of Food in the Last Year: Never true     Ran Out of Food in the Last Year: Never true   Transportation Needs: No Transportation Needs (7/12/2025)    PRAPARE - Transportation     Lack of Transportation (Medical): No     Lack of Transportation (Non-Medical): No   Recent Concern: Transportation Needs - Unmet Transportation Needs (5/24/2025)    PRAPARE - Transportation     Lack of Transportation (Medical): No     Lack of Transportation (Non-Medical): Yes   Physical Activity: Unknown (2/29/2024)    Exercise Vital Sign     Days of Exercise per Week: 2 days   Recent Concern: Physical Activity - Inactive (2/29/2024)    Exercise Vital Sign     Days of Exercise per Week: 2 days     Minutes of Exercise per Session: 0 min   Stress: Stress Concern Present (7/12/2025)    [employer] - Occupational Stress Questionnaire     Feeling of Stress : To some extent   Housing Stability: Low Risk  (7/12/2025)    Housing Stability Vital Sign     Unable to Pay for Housing in the Last Year: No     Homeless in the Last Year: No     Review of Systems   Gastrointestinal:  Positive for diarrhea.   All other systems reviewed and are negative.    Objective:     Vital Signs (Most Recent):  Temp: 98.1 °F (36.7 °C) (07/12/25 1045)  Pulse: 103 (07/12/25 1055)  Resp: 16 (07/12/25 1045)  BP: (!) 84/0 (07/12/25 1045)  SpO2: 100 % (07/12/25 0752) Vital Signs (24h Range):  Temp:  [98.1 °F (36.7 °C)-99.4 °F (37.4 °C)] 98.1  °F (36.7 °C)  Pulse:  [] 103  Resp:  [12-20] 16  SpO2:  [79 %-100 %] 100 %  BP: ()/(0-95) 84/0     Weight: 75.8 kg (167 lb 1.7 oz)  Body mass index is 22.05 kg/m².    Estimated Creatinine Clearance: 19.8 mL/min (A) (based on SCr of 4.1 mg/dL (H)).     Physical Exam  Vitals and nursing note reviewed.   Constitutional:       Appearance: Normal appearance.   HENT:      Head: Normocephalic and atraumatic.   Eyes:      General: No scleral icterus.        Right eye: No discharge.         Left eye: No discharge.   Pulmonary:      Effort: Pulmonary effort is normal.      Breath sounds: Normal breath sounds.   Abdominal:      General: There is no distension.      Palpations: Abdomen is soft.      Tenderness: There is no abdominal tenderness.      Comments: Dressings covering LVAD exit site.   Skin:     General: Skin is warm.   Neurological:      General: No focal deficit present.      Mental Status: He is alert and oriented to person, place, and time.          Significant Labs:   Microbiology Results (last 7 days)       Procedure Component Value Units Date/Time    Clostridium difficile EIA [[id number]] Collected: 07/12/25 0246    Order Status: Sent Specimen: Stool Updated: 07/12/25 0935    Blood culture [[id number]] Collected: 07/11/25 1953    Order Status: Sent Specimen: Blood from Peripheral, Upper Arm, Right Updated: 07/11/25 2220    Blood culture [[id number]] Collected: 07/11/25 1953    Order Status: Sent Specimen: Blood from Peripheral, Antecubital, Right Updated: 07/11/25 2220    Influenza A & B by Molecular [[id number]]  (Normal) Collected: 07/11/25 1634    Order Status: Completed Specimen: Nasal Swab Updated: 07/11/25 1841     INFLUENZA A MOLECULAR Negative     INFLUENZA B MOLECULAR  Negative    Culture, Respiratory with Gram Stain [[id number]]     Order Status: Sent Specimen: Respiratory             Significant Imaging: I have reviewed all pertinent imaging results/findings within the past 24  hours.

## 2025-07-12 NOTE — HPI
65 M PMHx of stage D HFrEF 2/2 ICM s/p HM3 as DT (12/1/2023), ESRD on HD (MWF), and debility here for ongoing diarrhea after treatment of c. difficile infection. Recent admissions for ongoing diarrhea. Pt c/o of weakness after HD yesterday. 1.5L removed yesterday with HD. Also experienced low flow alarms x 6. K 2.4. Nephrology consulted for ESRD.

## 2025-07-12 NOTE — PLAN OF CARE
Pt engaged well in evaluative session     Problem: Occupational Therapy  Goal: Occupational Therapy Goal  Description: Goals to be met by: 8/2/25     Patient will increase functional independence with ADLs by performing:    UE Dressing with Contact Guard Assistance.  LE Dressing with Contact Guard Assistance.  Grooming while seated at sink with Stand-by Assistance.  Toileting from toilet with Contact Guard Assistance for hygiene and clothing management.   Rolling to Bilateral with Supervision.   Supine to sit with Supervision.  Step transfer with Minimal Assistance and LRAD  Toilet transfer to toilet with Minimal Assistance and LRAD.  Upper extremity exercise program x10 reps per handout, with independence.  7/12/2025 1558 by Jackie Major, AVI  Outcome: Progressing

## 2025-07-12 NOTE — ASSESSMENT & PLAN NOTE
Creatine stable for now. BMP reviewed- noted Estimated Creatinine Clearance: 20.3 mL/min (A) (based on SCr of 4 mg/dL (H)). according to latest data. Based on current GFR, CKD stage is end stage.  Monitor UOP and serial BMP and adjust therapy as needed. Renally dose meds. Avoid nephrotoxic medications and procedures.  Nephrology consulted for dialysis

## 2025-07-12 NOTE — ED NOTES
Telemetry Verification   Patient placed on Telemetry Box  Verified with War Room  Box # [id number]   Monitor Tech Luisa   Rate 77   Rhythm LVAD

## 2025-07-12 NOTE — CONSULTS
"  [facility] - Cardiology Stepdown  Adult Nutrition  Consult Note    SUMMARY     Recommendations  --Continue Renal on Dialysis Heart Healthy Fluid - 1500 mL diet as tolerated and clinically indicated   --Order Novasource Renal if PO intake <50% EEN  --Encourage good intakes   --Nursing: please continue to document % meal eaten on flowsheet   --RD to monitor weight, PO intake     Goal #1: PO intake will meet greater than or equal to 75% of estimated needs by RD follow up  Nutrition Goal Status #1: new  Goal #2: Maintain weight throughout hospitalization  Nutrition Goal Status #2: new  Communication of RD Recs:  (POC)    Nutrition Discharge Planning     Nutrition Discharge Planning: Therapeutic diet (comments)  Therapeutic diet (comments): Renal diet    Reason for Assessment    Reason For Assessment: consult (Nutritional assessment)  Diagnosis:  (Diarrhea of presumed infectious origin)  General Information Comments: 65 M PMHx of stage D HFrEF 2/2 ICM s/p HM3 as DT (12/1/2023), ESRD on HD (MWF), and debility here for ongoing diarrhea after treatment of c. difficile infection. RD consult for nutrition assessment. Attempted to speak to pt - provider in room at time of visit. No PO intake documented. Noted 10.7% weight loss x 1 month. NFPE at follow-up.     Nutrition Related Social Determinants of Health: SDOH: Adequate food in home environment     Food Insecurity: No Food Insecurity (7/12/2025)    Hunger Vital Sign     Worried About Running Out of Food in the Last Year: Never true     Ran Out of Food in the Last Year: Never true       Nutrition/Diet History    Spiritual, Cultural Beliefs, Restoration Practices, Values that Affect Care: no  Food Allergies: NKFA  Factors Affecting Nutritional Intake: diarrhea    Anthropometrics    Height: 6' 1" (185.4 cm)  Height (inches): 73 in  Height Method: Stated  Weight: 75.8 kg (167 lb 1.7 oz)  Weight (lb): 167.11 lb  Weight Method: Stated  Ideal Body Weight (IBW), Male: 184 lb  % Ideal " Body Weight, Male (lb): 90.82 %  BMI (Calculated): 22.1  BMI Grade: 18.5-24.9 - normal       Lab/Procedures/Meds    Pertinent Labs Reviewed: reviewed - K 2.8, creatinine 4.1, eGFR 16, P 1.7, ,     Pertinent Medications Reviewed: reviewed - potassium chloride, potassium sodium phosphates     Estimated/Assessed Needs    Weight Used For Calorie Calculations: 75.8 kg (167 lb 1.7 oz)  Energy Calorie Requirements (kcal): 2009 kcal/day (x 1.25 AF)  Energy Need Method: [place]-St Jeor  Protein Requirements: 76-91 g/day (1.0-1.2 g/kg)  Weight Used For Protein Calculations: 75.8 kg (167 lb 1.7 oz)     Estimated Fluid Requirement Method: RDA Method  RDA Method (mL): 2009  CHO Requirement: 251 g/day      Nutrition Prescription Ordered    Current Diet Order: Renal on Dialysis Heart Healthy Fluid - 1500 mL    Evaluation of Received Nutrient/Fluid Intake    Comments: LBM 7/12  % Intake of Estimated Energy Needs: MARIAM   % Meal Intake: MARIAM     PES Statement  Increased nutrient needs related to  (Metabolic demand of disease and treatment) as evidenced by  (ESRD on dialysis)  Status: New    Nutrition Risk    Level of Risk/Frequency of Follow-up: high       Monitor and Evaluation    Monitor and Evaluation: Energy intake, Weight, Diet order, Food and beverage intake, Food and nutrition knowledge, Electrolyte and renal panel, Gastrointestinal profile, Glucose/endocrine profile, Nutrition focused physical findings, Lipid profile, Inflammatory profile, Skin       Nutrition Follow-Up    RD Follow-up?: Yes

## 2025-07-12 NOTE — PLAN OF CARE
Plan of care reviewed with patient and wife, understanding of care verbalized. Patient is resting in bed with no questions or complaints at this time. Bed is in lowest locked position, call light within reach.  Problem: Ventricular Assist Device  Goal: Optimal Adjustment to Device  Outcome: Progressing  Goal: Absence of Bleeding  Outcome: Progressing  Goal: Absence of Embolism Signs and Symptoms  Outcome: Progressing  Goal: Optimal Blood Flow  Outcome: Progressing  Goal: Absence of Infection Signs and Symptoms  Outcome: Progressing  Goal: Effective Right-Sided Heart Function  Outcome: Progressing     Problem: Fall Injury Risk  Goal: Absence of Fall and Fall-Related Injury  Outcome: Progressing     Problem: Fatigue  Goal: Improved Activity Tolerance  Outcome: Progressing

## 2025-07-12 NOTE — ASSESSMENT & PLAN NOTE
Recent Labs     07/11/25  1634   *     Latest ECHO  Results for orders placed during the hospital encounter of 04/02/25    Echo    Interpretation Summary    LVAD: There is a Heartmate III LVAD running at 5,000 RPM.    Left Ventricle: The left ventricle is normal in size. Normal wall thickness. Global hypokinesis present. There is severely reduced systolic function with a visually estimated ejection fraction of 10 -15%. Unable to assess diastolic function due to LVAD.    Right Ventricle: The right ventricle is normal in size. Right ventricle wall motion has global hypokinesis. Systolic function is mildly reduced. Pacemaker lead present in the ventricle.    Left Atrium: Left atrium is dilated.    Aortic Valve: The aortic valve repaired.    Mitral Valve: The mitral valve is repaired with an Noble stitch.    Pulmonary Artery: The estimated pulmonary artery systolic pressure is 27 mmHg.    IVC/SVC: Intermediate venous pressure at 8 mmHg.    Current Heart Failure Medications  hydrALAZINE tablet 50 mg, Every 8 hours, Oral    Plan  - Monitor strict I&Os and daily weights.    - Place on telemetry  - Low sodium diet  - Place on fluid restriction of .   - no ACE inhibitor ARB,ARNI, MRA because of low blood pressure   - No beta blocker because of RV dysfunction  - start home hydralazine 50 t.i.d.

## 2025-07-12 NOTE — NURSING
Notified by core lab of patient's critical potassium of 2.4. MD. Pandey notified, no new orders given at this time, he will pass to morning team. Patient was given potassium after morning labs were collected.

## 2025-07-12 NOTE — HPI
65 M PMHx of stage D HFrEF 2/2 ICM s/p HM3 as DT (12/1/2023), ESRD on HD (MWF), and debility here for ongoing diarrhea after treatment of c. difficile infection.     Pertinent history below:     5/9-11: Patient was recently hospitalized for anemia and C-difficile infection. At that time patient received a unit of blood, hydrated for low flow alarms, hydralazine given to target MAPs to 80s with Hydral 50mg TID, then put on extended vancomycin course with taper for discharge on 5/11  5 mLs (125 mg total) q6 hours for 9 days,   5 mLs (125 mg total) q12h day for 7 days,   5 mLs (125 mg total) qdaily for 7 days,   5 mLs (125 mg total) every other day for 14 days.    He was admitted again for ongoing diarrhea and low-flow alarms from 05/23 - 05/26/2025 and treated with IV fluids.  His last clinic visit with heart transplant Service with Dr. Snowden on 06/11/2023.  His C diff report was positive on August 2024, 05/10/2025.  He was negative for C diff infection on 05/23/2025, 07/10/2025    Patient is on hemodialysis for is ESRD Monday Wednesday Friday, he presented to the emergency room for feeling worsening of weakness since today morning after the dialysis.  Patient and his wife reports having diarrhea ongoing for few weeks and then loss of appetite for past 2-3 weeks.  Patient had low-flow alarms x6 today.  He is getting admitted to heart transplant Service.

## 2025-07-13 LAB
ABSOLUTE EOSINOPHIL (OHS): 0.03 K/UL
ABSOLUTE MONOCYTE (OHS): 0.27 K/UL (ref 0.3–1)
ABSOLUTE NEUTROPHIL COUNT (OHS): 3.6 K/UL (ref 1.8–7.7)
ANION GAP (OHS): 7 MMOL/L (ref 8–16)
APTT PPP: 53.9 SECONDS (ref 21–32)
BASOPHILS # BLD AUTO: 0.03 K/UL
BASOPHILS NFR BLD AUTO: 0.7 %
BUN SERPL-MCNC: 21 MG/DL (ref 8–23)
C DIFF GDH STL QL: NEGATIVE
C DIFF TOX A+B STL QL IA: NEGATIVE
CALCIUM SERPL-MCNC: 7.5 MG/DL (ref 8.7–10.5)
CHLORIDE SERPL-SCNC: 107 MMOL/L (ref 95–110)
CO2 SERPL-SCNC: 23 MMOL/L (ref 23–29)
CREAT SERPL-MCNC: 4.9 MG/DL (ref 0.5–1.4)
ERYTHROCYTE [DISTWIDTH] IN BLOOD BY AUTOMATED COUNT: 21.1 % (ref 11.5–14.5)
GFR SERPLBLD CREATININE-BSD FMLA CKD-EPI: 13 ML/MIN/1.73/M2
GLUCOSE SERPL-MCNC: 69 MG/DL (ref 70–110)
HCT VFR BLD AUTO: 35.3 % (ref 40–54)
HGB BLD-MCNC: 11 GM/DL (ref 14–18)
IMM GRANULOCYTES # BLD AUTO: 0.02 K/UL (ref 0–0.04)
IMM GRANULOCYTES NFR BLD AUTO: 0.4 % (ref 0–0.5)
INR PPP: 2.7 (ref 0.8–1.2)
LDH SERPL-CCNC: 382 U/L (ref 110–260)
LYMPHOCYTES # BLD AUTO: 0.6 K/UL (ref 1–4.8)
MAGNESIUM SERPL-MCNC: 1.7 MG/DL (ref 1.6–2.6)
MCH RBC QN AUTO: 28.1 PG (ref 27–31)
MCHC RBC AUTO-ENTMCNC: 31.2 G/DL (ref 32–36)
MCV RBC AUTO: 90 FL (ref 82–98)
NUCLEATED RBC (/100WBC) (OHS): 0 /100 WBC
PHOSPHATE SERPL-MCNC: 1.9 MG/DL (ref 2.7–4.5)
PLATELET # BLD AUTO: 134 K/UL (ref 150–450)
PMV BLD AUTO: 10.1 FL (ref 9.2–12.9)
POCT GLUCOSE: 89 MG/DL (ref 70–110)
POTASSIUM SERPL-SCNC: 3.5 MMOL/L (ref 3.5–5.1)
PROTHROMBIN TIME: 28 SECONDS (ref 9–12.5)
RBC # BLD AUTO: 3.92 M/UL (ref 4.6–6.2)
RELATIVE EOSINOPHIL (OHS): 0.7 %
RELATIVE LYMPHOCYTE (OHS): 13.2 % (ref 18–48)
RELATIVE MONOCYTE (OHS): 5.9 % (ref 4–15)
RELATIVE NEUTROPHIL (OHS): 79.1 % (ref 38–73)
SODIUM SERPL-SCNC: 137 MMOL/L (ref 136–145)
WBC # BLD AUTO: 4.55 K/UL (ref 3.9–12.7)

## 2025-07-13 PROCEDURE — 99233 SBSQ HOSP IP/OBS HIGH 50: CPT | Mod: ,,, | Performed by: INTERNAL MEDICINE

## 2025-07-13 PROCEDURE — 85730 THROMBOPLASTIN TIME PARTIAL: CPT | Performed by: STUDENT IN AN ORGANIZED HEALTH CARE EDUCATION/TRAINING PROGRAM

## 2025-07-13 PROCEDURE — 84100 ASSAY OF PHOSPHORUS: CPT | Performed by: STUDENT IN AN ORGANIZED HEALTH CARE EDUCATION/TRAINING PROGRAM

## 2025-07-13 PROCEDURE — 36415 COLL VENOUS BLD VENIPUNCTURE: CPT | Performed by: STUDENT IN AN ORGANIZED HEALTH CARE EDUCATION/TRAINING PROGRAM

## 2025-07-13 PROCEDURE — 27000248 HC VAD-ADDITIONAL DAY

## 2025-07-13 PROCEDURE — 83735 ASSAY OF MAGNESIUM: CPT | Performed by: STUDENT IN AN ORGANIZED HEALTH CARE EDUCATION/TRAINING PROGRAM

## 2025-07-13 PROCEDURE — 25000003 PHARM REV CODE 250: Performed by: PHYSICIAN ASSISTANT

## 2025-07-13 PROCEDURE — 85025 COMPLETE CBC W/AUTO DIFF WBC: CPT | Performed by: STUDENT IN AN ORGANIZED HEALTH CARE EDUCATION/TRAINING PROGRAM

## 2025-07-13 PROCEDURE — 11000001 HC ACUTE MED/SURG PRIVATE ROOM

## 2025-07-13 PROCEDURE — 25000003 PHARM REV CODE 250: Performed by: STUDENT IN AN ORGANIZED HEALTH CARE EDUCATION/TRAINING PROGRAM

## 2025-07-13 PROCEDURE — 25000003 PHARM REV CODE 250: Performed by: INTERNAL MEDICINE

## 2025-07-13 PROCEDURE — 85610 PROTHROMBIN TIME: CPT | Performed by: STUDENT IN AN ORGANIZED HEALTH CARE EDUCATION/TRAINING PROGRAM

## 2025-07-13 PROCEDURE — 94761 N-INVAS EAR/PLS OXIMETRY MLT: CPT

## 2025-07-13 PROCEDURE — 25000003 PHARM REV CODE 250

## 2025-07-13 PROCEDURE — 80048 BASIC METABOLIC PNL TOTAL CA: CPT | Performed by: STUDENT IN AN ORGANIZED HEALTH CARE EDUCATION/TRAINING PROGRAM

## 2025-07-13 PROCEDURE — 83615 LACTATE (LD) (LDH) ENZYME: CPT | Performed by: STUDENT IN AN ORGANIZED HEALTH CARE EDUCATION/TRAINING PROGRAM

## 2025-07-13 RX ORDER — LOPERAMIDE HYDROCHLORIDE 2 MG/1
4 CAPSULE ORAL ONCE
Status: COMPLETED | OUTPATIENT
Start: 2025-07-13 | End: 2025-07-13

## 2025-07-13 RX ORDER — DIPHENOXYLATE HYDROCHLORIDE AND ATROPINE SULFATE 2.5; .025 MG/1; MG/1
1 TABLET ORAL 4 TIMES DAILY PRN
Status: DISCONTINUED | OUTPATIENT
Start: 2025-07-13 | End: 2025-07-14 | Stop reason: HOSPADM

## 2025-07-13 RX ADMIN — VANCOMYCIN HYDROCHLORIDE 500 MG: KIT at 05:07

## 2025-07-13 RX ADMIN — POTASSIUM & SODIUM PHOSPHATES POWDER PACK 280-160-250 MG 1 PACKET: 280-160-250 PACK at 09:07

## 2025-07-13 RX ADMIN — WARFARIN SODIUM 0.5 MG: 1 TABLET ORAL at 04:07

## 2025-07-13 RX ADMIN — AMIODARONE HYDROCHLORIDE 400 MG: 200 TABLET ORAL at 08:07

## 2025-07-13 RX ADMIN — POTASSIUM & SODIUM PHOSPHATES POWDER PACK 280-160-250 MG 1 PACKET: 280-160-250 PACK at 11:07

## 2025-07-13 RX ADMIN — POTASSIUM & SODIUM PHOSPHATES POWDER PACK 280-160-250 MG 1 PACKET: 280-160-250 PACK at 04:07

## 2025-07-13 RX ADMIN — HYDRALAZINE HYDROCHLORIDE 75 MG: 25 TABLET ORAL at 03:07

## 2025-07-13 RX ADMIN — POTASSIUM & SODIUM PHOSPHATES POWDER PACK 280-160-250 MG 1 PACKET: 280-160-250 PACK at 05:07

## 2025-07-13 RX ADMIN — HYDRALAZINE HYDROCHLORIDE 75 MG: 25 TABLET ORAL at 09:07

## 2025-07-13 RX ADMIN — HYDRALAZINE HYDROCHLORIDE 75 MG: 25 TABLET ORAL at 05:07

## 2025-07-13 RX ADMIN — Medication 1 CAPSULE: at 11:07

## 2025-07-13 RX ADMIN — VENLAFAXINE 37.5 MG: 37.5 TABLET ORAL at 08:07

## 2025-07-13 RX ADMIN — LOPERAMIDE HYDROCHLORIDE 4 MG: 2 CAPSULE ORAL at 11:07

## 2025-07-13 NOTE — PLAN OF CARE
Problem: Adult Inpatient Plan of Care  Goal: Patient-Specific Goal (Individualized)  Outcome: Progressing  Flowsheets (Taken 7/13/2025 1527)  Individualized Care Needs: updated patient on POC  Anxieties, Fears or Concerns: none stated     Problem: Diabetes Comorbidity  Goal: Blood Glucose Level Within Targeted Range  Outcome: Progressing  Intervention: Monitor and Manage Glycemia  Flowsheets (Taken 7/13/2025 1527)  Glycemic Management: blood glucose monitored     Problem: Fall Injury Risk  Goal: Absence of Fall and Fall-Related Injury  Outcome: Progressing  Intervention: Identify and Manage Contributors  Flowsheets (Taken 7/13/2025 1527)  Self-Care Promotion:   independence encouraged   BADL personal objects within reach   BADL personal routines maintained  Medication Review/Management: medications reviewed  Intervention: Promote Injury-Free Environment  Flowsheets (Taken 7/13/2025 1527)  Safety Promotion/Fall Prevention:   assistive device/personal item within reach   side rails raised x 2   room near unit station   patient expresses understanding of fall risk and prevention   nonskid shoes/socks when out of bed   medications reviewed   Fall Risk signage in place   Fall Risk reviewed with patient/family     Problem: Fatigue  Goal: Improved Activity Tolerance  Outcome: Progressing  Intervention: Promote Improved Energy  Flowsheets (Taken 7/13/2025 1527)  Fatigue Management: frequent rest breaks encouraged  Sleep/Rest Enhancement:   awakenings minimized   consistent schedule promoted   family presence promoted  Activity Management:   Up in chair - L3   Rolling - L1  Environmental Support:   calm environment promoted   caregiver consistency promoted   comfort object encouraged   rest periods encouraged   personal routine supported   Plan of care discussed with patient.  Patient ambulating independently, fall precautions in place. LVAD DP and numbers WNL, smooth LVAD hum. Patient has no complaints of pain. Discussed  medications and care.  Patient has no questions at this time.

## 2025-07-13 NOTE — PROGRESS NOTES
[facility] - Cardiology Stepdown  Infectious Disease  Progress Note    Patient Name: Radha Abbott  MRN: [id number]  Admission Date: 7/11/2025  Length of Stay: 2 days  Attending Physician: No att. providers found  Primary Care Provider: Vasu Kong MD    Isolation Status: No active isolations  Assessment/Plan:      GI  * Diarrhea of presumed infectious origin  65 year old male with a history stage D HFrEF 2/2 ICM s/p HM3 as DT (12/1/2023), ESRD on HD (MWF), and clostridium difficile colitis for which was received treatment followed by a slow taper on oral vancomycin.  Shortly after completing his oral vancomycin taper, the patient developed diarrhea again.  Stool studies obtained on 7/10 were negative for C diff.  Patient presented for admission for evaluation of his ongoing diarrhea.    Repeat C diff testing on 7/12 has now returned negative also.  Patient doesn't have C diff.  Stool studies for other infectious pathogens is negative so far.  Suspect his persistent diarrhea may be related to significant disruption of his gut microbiome.  Recommend probiotics, immodium.  May consider lomotil to help slow diarrhea also.  ID will sign off.          Anticipated Disposition: TBD    Thank you for your consult. I will sign off. Please contact us if you have any additional questions.    Axel Caban MD  Infectious Disease  [facility] - Cardiology Stepdown    Time: 50 minutes   50% of time spent on face-to-face counseling and coordination of care. Counseling included review of test results, diagnosis, and treatment plan with patient and/or family.  I have reviewed hospital notes from cardiologyl service and other specialty providers as well as outside medical records. I have also reviewed CBC, CMP/BMP,  cultures and imaging with my interpretation as documented. Patient is high risk of morbidity, on antibiotics requiring intensive monitoring for toxicity.       Subjective:     Principal Problem:Diarrhea of presumed  infectious origin    HPI: 65 year old male with a history stage D HFrEF 2/2 ICM s/p HM3 as DT (12/1/2023), ESRD on HD (MWF), and clostridium difficile colitis for which was received treatment followed by a slow taper on oral vancomycin.  Shortly after completing his oral vancomycin taper, the patient developed diarrhea again.  Stool studies obtained on 7/10 were negative for C diff.  Patient presented for admission for evaluation of his ongoing diarrhea.  Interval History: Continues to have multiple bowel movements.    Review of Systems   Gastrointestinal:  Positive for diarrhea.   All other systems reviewed and are negative.    Objective:     Vital Signs (Most Recent):  Temp: 99.1 °F (37.3 °C) (07/13/25 1100)  Pulse: 104 (07/13/25 1220)  Resp: 18 (07/13/25 1100)  BP: (!) 84/0 (07/13/25 1100)  SpO2: 99 % (07/13/25 1100) Vital Signs (24h Range):  Temp:  [98 °F (36.7 °C)-99.1 °F (37.3 °C)] 99.1 °F (37.3 °C)  Pulse:  [] 104  Resp:  [16-19] 18  SpO2:  [95 %-99 %] 99 %  BP: ()/(0-86) 84/0     Weight: 75.8 kg (167 lb 1.7 oz)  Body mass index is 22.05 kg/m².    Estimated Creatinine Clearance: 16.5 mL/min (A) (based on SCr of 4.9 mg/dL (H)).     Physical Exam  Vitals and nursing note reviewed.   Constitutional:       Appearance: Normal appearance.   HENT:      Head: Normocephalic and atraumatic.   Eyes:      General: No scleral icterus.        Right eye: No discharge.         Left eye: No discharge.   Pulmonary:      Effort: Pulmonary effort is normal.      Breath sounds: Normal breath sounds.   Abdominal:      General: There is no distension.      Palpations: Abdomen is soft.      Tenderness: There is no abdominal tenderness.      Comments: Dressings covering LVAD exit site.   Skin:     General: Skin is warm.   Neurological:      General: No focal deficit present.      Mental Status: He is alert and oriented to person, place, and time.          Significant Labs:   Microbiology Results (last 7 days)        Procedure Component Value Units Date/Time    Clostridium difficile EIA [[id number]]  (Normal) Collected: 07/12/25 0246    Order Status: Completed Specimen: Stool Updated: 07/13/25 0542     C. DIFFICILE GDH AG Negative     Clostridioides difficile Toxin A/B Negative    Blood culture [[id number]] Collected: 07/11/25 1953    Order Status: Sent Specimen: Blood from Peripheral, Upper Arm, Right Updated: 07/11/25 2220    Blood culture [[id number]] Collected: 07/11/25 1953    Order Status: Resulted Specimen: Blood from Peripheral, Antecubital, Right Updated: 07/11/25 2220    Influenza A & B by Molecular [[id number]]  (Normal) Collected: 07/11/25 1634    Order Status: Completed Specimen: Nasal Swab Updated: 07/11/25 1841     INFLUENZA A MOLECULAR Negative     INFLUENZA B MOLECULAR  Negative    Culture, Respiratory with Gram Stain [[id number]]     Order Status: Sent Specimen: Respiratory             Significant Imaging: I have reviewed all pertinent imaging results/findings within the past 24 hours.

## 2025-07-13 NOTE — ASSESSMENT & PLAN NOTE
-HeartMate 3 Implanted  12/1/2023  as DT  -Hold Coumadin, Goal INR 2.0-3.0   therapeutic today. Home dose is 1mg Qdaily   -Antiplatelets Not on ASA  -LDH is stable overall today. Will continue to monitor daily.  -Speed set at 5000, LSL 4600 rpm  -Interrogation notable for LFA likely in the setting of HTN.  Will monitor with addition antihypertensives   -Not listed for OHTx    Procedure: Device Interrogation Including analysis of device parameters  Current Settings: Ventricular Assist Device  Review of device function is stable      7/13/2025     7:45 AM 7/13/2025     3:54 AM 7/12/2025    11:28 PM 7/12/2025     7:53 PM 7/12/2025     4:27 PM 7/12/2025    10:48 AM 7/12/2025     7:58 AM   TXP LVAD INTERROGATIONS   Type HeartMate3 HeartMate3 HeartMate3 HeartMate3 HeartMate3 HeartMate3 HeartMate3   Flow 3.3 3 3.1 3.3 3.2 3.2 3.2   Speed 5000 5000 5000 5000 5000 5000 5000   PI 7.3 8.4 8.1 7 8 8.3 7.6   Power ([place]) 3.5 3.5 3.4 3.3 3.3 3.4 3.5   LSL 4600 4600 4600 4600 4600 4600 4600   Pulsatility Pulse Pulse No Pulse No Pulse No Pulse No Pulse Intermittent pulse

## 2025-07-13 NOTE — PROGRESS NOTES
[facility] - Cardiology Stepdown  Heart Transplant  Progress Note    Patient Name: Radha Abbott  MRN: [id number]  Admission Date: 7/11/2025  Hospital Length of Stay: 2 days  Attending Physician: No att. providers found  Primary Care Provider: Vasu Kong MD  Principal Problem:Diarrhea of presumed infectious origin    Subjective:   Interval History: HM 3 with recent hx of C-diff admitted with persistent diarrhea and LFA.  He has not had LFA since admission. Hydralazine increased to 75mg TID yesterday. Will monitor response.  Appreciate GI and ID consultation.  C-diff negative so will stop  Vanc.  Supportive treatment with  Imodium,  Lomotil and probiotic.  Will monitor response to supportive care and likely discharge tomorrow.       Continuous Infusions:  Scheduled Meds:   amiodarone  400 mg Oral Daily    hydrALAZINE  10 mg Intravenous Once    hydrALAZINE  75 mg Oral Q8H    Lactobacillus rhamnosus GG  1 capsule Oral Daily    loperamide  4 mg Oral Once    potassium, sodium phosphates  1 packet Oral QID (AC & HS)    venlafaxine  37.5 mg Oral Daily     PRN Meds:  Current Facility-Administered Medications:     dextrose 50%, 12.5 g, Intravenous, PRN    dextrose 50%, 25 g, Intravenous, PRN    diphenoxylate-atropine 2.5-0.025 mg, 1 tablet, Oral, QID PRN    glucagon (human recombinant), 1 mg, Intramuscular, PRN    glucose, 16 g, Oral, PRN    glucose, 24 g, Oral, PRN    insulin aspart U-100, 0-5 Units, Subcutaneous, QID (AC + HS) PRN    Review of patient's allergies indicates:  No Known Allergies  Objective:     Vital Signs (Most Recent):  Temp: 99.1 °F (37.3 °C) (07/13/25 0742)  Pulse: 82 (07/13/25 1000)  Resp: 16 (07/13/25 0742)  BP: (!) 98/0 (07/13/25 0841)  SpO2: 95 % (07/13/25 0742) Vital Signs (24h Range):  Temp:  [98 °F (36.7 °C)-99.1 °F (37.3 °C)] 99.1 °F (37.3 °C)  Pulse:  [] 82  Resp:  [16-19] 16  SpO2:  [95 %-99 %] 95 %  BP: ()/(0-86) 98/0     Patient Vitals for the past 72 hrs (Last 3 readings):    Weight   07/12/25 1231 75.8 kg (167 lb 1.7 oz)   07/11/25 1623 75.8 kg (167 lb 1.7 oz)   07/11/25 1506 75.8 kg (167 lb)     Body mass index is 22.05 kg/m².    No intake or output data in the 24 hours ending 07/13/25 1103      Hemodynamic Parameters:       Telemetry: reviewed     Physical Exam  Vitals and nursing note reviewed.   Constitutional:       Appearance: Normal appearance.   HENT:      Head: Normocephalic.      Nose: Nose normal.      Mouth/Throat:      Mouth: Mucous membranes are moist.   Eyes:      Pupils: Pupils are equal, round, and reactive to light.   Neck:      Comments: No JVP elevation   Cardiovascular:      Comments: Smooth VAD hum  Pulmonary:      Effort: Pulmonary effort is normal.      Breath sounds: Normal breath sounds.   Abdominal:      General: Bowel sounds are normal. There is no distension.      Palpations: Abdomen is soft.   Musculoskeletal:         General: Normal range of motion.      Right lower leg: No edema.      Left lower leg: No edema.   Skin:     General: Skin is warm.      Capillary Refill: Capillary refill takes 2 to 3 seconds.   Neurological:      General: No focal deficit present.      Mental Status: He is alert and oriented to person, place, and time.   Psychiatric:         Mood and Affect: Mood normal.         Behavior: Behavior normal.            Significant Labs:  CBC:  Recent Labs   Lab 07/11/25  1634 07/12/25  0505 07/13/25  0600   WBC 4.64 3.95 4.55   RBC 4.54* 3.60* 3.92*   HGB 12.9* 10.3* 11.0*   HCT 40.8 32.2* 35.3*   * 108* 134*   MCV 90 89 90   MCH 28.4 28.6 28.1   MCHC 31.6* 32.0 31.2*     BNP:  Recent Labs   Lab 07/11/25  1634   *     CMP:  Recent Labs   Lab 07/11/25  1812 07/12/25  0505 07/12/25  0913 07/13/25  0600   GLU 99 74 75 69*   CALCIUM 7.5* 7.2* 7.8* 7.5*   ALBUMIN 2.3*  --   --   --    PROT 6.2  --   --   --     136 138 137   K 3.5 2.4* 2.8* 3.5   CO2 23 25 24 23    104 105 107   BUN 12 15 16 21   CREATININE 3.3* 4.0* 4.1*  4.9*   ALKPHOS 145  --   --   --    *  --   --   --    *  --   --   --    BILITOT 0.7  --   --   --       Coagulation:   Recent Labs   Lab 07/11/25  1634 07/12/25  0505 07/13/25  0600   INR 3.4* 3.4* 2.7*   APTT  --  58.4* 53.9*     LDH:  Recent Labs   Lab 07/11/25  1812 07/12/25  0505 07/13/25  0600   * 351* 382*     Microbiology:  Microbiology Results (last 7 days)       Procedure Component Value Units Date/Time    Clostridium difficile EIA [[id number]]  (Normal) Collected: 07/12/25 0246    Order Status: Completed Specimen: Stool Updated: 07/13/25 0542     C. DIFFICILE GDH AG Negative     Clostridioides difficile Toxin A/B Negative    Blood culture [[id number]] Collected: 07/11/25 1953    Order Status: Sent Specimen: Blood from Peripheral, Upper Arm, Right Updated: 07/11/25 2220    Blood culture [[id number]] Collected: 07/11/25 1953    Order Status: Sent Specimen: Blood from Peripheral, Antecubital, Right Updated: 07/11/25 2220    Influenza A & B by Molecular [[id number]]  (Normal) Collected: 07/11/25 1634    Order Status: Completed Specimen: Nasal Swab Updated: 07/11/25 1841     INFLUENZA A MOLECULAR Negative     INFLUENZA B MOLECULAR  Negative    Culture, Respiratory with Gram Stain [[id number]]     Order Status: Sent Specimen: Respiratory             I have reviewed all pertinent labs within the past 24 hours.    Estimated Creatinine Clearance: 16.5 mL/min (A) (based on SCr of 4.9 mg/dL (H)).    Diagnostic Results:  I have reviewed all pertinent imaging results/findings within the past 24 hours.  Assessment and Plan:     No notes on file    * Diarrhea of presumed infectious origin  -Repeat C-diff studies negative  -GI and ID consulted and recommended supportive  -Imodium, Lomotil and  probiotic ordered.    -IV fluid bolus given on admit.     LVAD (left ventricular assist device) present  -HeartMate 3 Implanted  12/1/2023  as DT  -Hold Coumadin, Goal INR 2.0-3.0   therapeutic today. Home  dose is 1mg Qdaily   -Antiplatelets Not on ASA  -LDH is stable overall today. Will continue to monitor daily.  -Speed set at 5000, LSL 4600 rpm  -Interrogation notable for LFA likely in the setting of HTN.  Will monitor with addition antihypertensives   -Not listed for OHTx    Procedure: Device Interrogation Including analysis of device parameters  Current Settings: Ventricular Assist Device  Review of device function is stable      7/13/2025     7:45 AM 7/13/2025     3:54 AM 7/12/2025    11:28 PM 7/12/2025     7:53 PM 7/12/2025     4:27 PM 7/12/2025    10:48 AM 7/12/2025     7:58 AM   TXP LVAD INTERROGATIONS   Type HeartMate3 HeartMate3 HeartMate3 HeartMate3 HeartMate3 HeartMate3 HeartMate3   Flow 3.3 3 3.1 3.3 3.2 3.2 3.2   Speed 5000 5000 5000 5000 5000 5000 5000   PI 7.3 8.4 8.1 7 8 8.3 7.6   Power ([place]) 3.5 3.5 3.4 3.3 3.3 3.4 3.5   LSL 4600 4600 4600 4600 4600 4600 4600   Pulsatility Pulse Pulse No Pulse No Pulse No Pulse No Pulse Intermittent pulse         Chronic combined systolic and diastolic CHF, NYHA class 4  -ICM s/p  3  -Last 2D Echo 4/3/25: LVEF 10-15%, LVEDD 5cm with speed at 5000  -Euvolemic on examination today  -Current diuretic regimen: N/A on HD MWF  -GDMT with N/A  -2g Na dietary restriction, 2000 mL fluid restriction, strict I/Os      PAF (paroxysmal atrial fibrillation)  -continue Amiodarone  -anticoagulated with coumadin     CAD (coronary artery disease)  -S/p 3vCABG in 2009  -Lipitor held  -Not on ASA post VAD    Anemia of chronic disease  -Due to underlying CKD  -Daily CBC    Type 2 diabetes mellitus without complication, without long-term current use of insulin  -Insulin sliding scale   -Hypoglycemia protocol    Clostridium difficile infection  -Check C diff negative   -Appreciate Infectious disease consult     ESRD (end stage renal disease)  -On HD MWF  -Nephrology consulted         DENIZ Faye  Heart Transplant  [facility] - Cardiology Stepdown

## 2025-07-13 NOTE — PROGRESS NOTES
Admit Note      Met with patient and spouse to assess patients needs. Patient is a 65 y.o.  male admitted for Diarrhea per medical record. Pt received LVAD in 2023. Pt's spouse does pt's dressing changes.    Patient admitted on 7/11/2025. At this time, patient and wife presents as alert and oriented x 4, pleasant, good eye contact, concentration/judgement good, calm, communicative, cooperative, and asking and answering questions appropriately.      Household/Family Systems    Patient resides with his spouse at:    [id number] [place] [address]    Pt's cell: [phone]    Additional contacts:  Arlyn Abbott, spouse, [phone]/M or [phone]/W  Teetee Zhou, sister, [phone] (retired and assist pt while spouse is at work)    Support system includes pt's spouse and sister Teetee. Pt has 4 adult children from a previous relationship that reside in [place], [place] (20 mins away from pt). Pt's spouse also has an adult son from a previous relationship that resides in [place], [place]. Patient does not have dependents that are need of being cared for.     Patients primary caregiver is spouse. Pt's spouse is employed full-time at [employer] as a Pt Access Rep.     During admission, patient's caregiver plans to stay in patient's room. Confirmed patient and patients caregivers do have access to reliable transportation.    Cognitive Status/Learning     Patients caregiver reports patients reading ability as 12th grade and states patient does not have difficulty with reading, writing, seeing, hearing, comprehension, learning, and memory. Pt wears bifocal eyeglasses for reading. Patient reports patient learns best by multiple methods: audio, visual, and hands-on.   Needed: No.   Highest education level: High School (9-12) or GED    Vocation/Disability    Working for Income: No  If no, reason not working: Disability  Pt is disabled due to HF since 2022. Prior to disability, pt was a  self-employed .    Adherence     Patients caregiver reports patient has a high level of adherence to his health care regimen. Adherence counseling and education provided. Patient verbalizes understanding.    Substance Use    Patient reports patients substance usage as the following:    Tobacco: Pt confirms he quit smoking cigarettes in .  Alcohol: Pt denies current use.  Illicit Drugs/Non-prescribed Medications: none, patient denies any use.    Patients caregiver states clear understanding of the potential impact of substance use. Substance abstinence/cessation counseling, education and resources provided and reviewed.     Services Utilizing/ADLS    Infusion Service: Prior to admission, patient utilizing? no Utilized [facility] ([phone], [phone]) in the past   Home Health: Prior to admission, patient utilizing? no Utilized [facility] ([phone]) in the past. Pt  going to [facility] on  and . [phone]  DME: Prior to admission, yes Pt has a RW, Rollator, PRW, WC, and BSC at home. Pt and wife asking for assistance with Electric Wheelchair that folds up and bedside handrails where Pt can pull himself up in bed.   Pulmonary/Cardiac Rehab: Prior to admission, no  Dialysis:  Prior to admission, yes OP HD at [facility] on F, 6:30 am shift, ph [phone], fax [phone]  Transplant Specialty Pharmacy:  Prior to admission, no.    Prior to admission, patient reports patient was not independent with ADLS and was not driving.  Patient reports patient is able to care for self at this time. Patient reports patient indicates a willingness to care for self once medically cleared to do so.    Insurance/Medications    Insured by   Payer/Plan Subscr  Sex Relation Sub. Ins. ID Effective Group Num   1. MEDICAID - ALISON BUCK 1960 Male Self [id number] [facility]                                   [address] [address]   2. [employer] - [facility] ALISON ZENDEJAS 1960  Male Self [id number] 10/9/23                                    PO       Primary Insurance (for UNOS reporting): Public Insurance - Medicaid  Secondary Insurance (for UNOS reporting): None    Patient reports patient is able to obtain and afford medications at this time and at time of discharge.    Living Will/Healthcare Power of     Patients caregiver reports patient has a LW and/or HCPA.  Pt's spouse is sole agent. Documents under Media tab in Epic.  provided education regarding LW and HCPA and the completion of forms.    Coping/Mental Health     Pt denies mental health concerns at this time Pt reports coping well. Pt states he takes walks, watches TV and speaks on phone with family and friends.    Discharge Planning    At time of discharge, patient plans to return to his home under the care of spouse and sister Teetee. Patients spouse will transport patient. Per rounds today, expected discharge date has not been medically determined at this time. Patient and pt's caretaker verbalize understanding and is involved in treatment planning and discharge process. Pt states he will resume [facility] and resume HD at [facility] this week.     Additional Concerns    Patient's caretaker denies additional needs and/or concerns at this time. Patient is being followed for needs, education, resources, information, emotional support, supportive counseling, and for supportive and skilled discharge plan of care.  providing ongoing psychosocial support, education, resources and d/c planning as needed.  SW remains available. Patient denies additional needs and/or concerns at this time. Patient verbalizes understanding and agreement with information reviewed, social work availability, and how to access available resources as needed.

## 2025-07-13 NOTE — ASSESSMENT & PLAN NOTE
65 year old male with a history stage D HFrEF 2/2 ICM s/p HM3 as DT (12/1/2023), ESRD on HD (MWF), and clostridium difficile colitis for which was received treatment followed by a slow taper on oral vancomycin.  Shortly after completing his oral vancomycin taper, the patient developed diarrhea again.  Stool studies obtained on 7/10 were negative for C diff.  Patient presented for admission for evaluation of his ongoing diarrhea.    Repeat C diff testing on 7/12 has now returned negative also.  Patient doesn't have C diff.  Stool studies for other infectious pathogens is negative so far.  Suspect his persistent diarrhea may be related to significant disruption of his gut microbiome.  Recommend probiotics, immodium.  May consider lomotil to help slow diarrhea also.  ID will sign off.

## 2025-07-13 NOTE — PROGRESS NOTES
07/13/2025  [place] Duarte    Current provider:  No att. providers found    Device interrogation:      7/13/2025     3:04 PM 7/13/2025    11:53 AM 7/13/2025     7:45 AM 7/13/2025     3:54 AM 7/12/2025    11:28 PM 7/12/2025     7:53 PM 7/12/2025     4:27 PM   TXP LVAD INTERROGATIONS   Type HeartMate3 HeartMate3 HeartMate3 HeartMate3 HeartMate3 HeartMate3 HeartMate3   Flow 3.2 3.2 3.3 3 3.1 3.3 3.2   Speed 5000 5000 5000 5000 5000 5000 5000   PI 6.9 8.4 7.3 8.4 8.1 7 8   Power ([place]) 3.5 3.5 3.5 3.5 3.4 3.3 3.3   LSL 4600 4600 4600 4600 4600 4600 4600   Pulsatility Intermittent pulse Pulse Pulse Pulse No Pulse No Pulse No Pulse          Rounded on [facility] to ensure all mechanical assist device settings (IABP or VAD) were appropriate and all parameters were within limits.  I was able to ensure all back up equipment was present, the staff had no issues, and the Perfusion Department daily rounding was complete.      For implantable VADs: Interrogation of Ventricular assist device was performed with analysis of device parameters and review of device function. I have personally reviewed the interrogation findings and agree with findings as stated.     In emergency, the nursing units have been notified to contact the perfusion department either by:  Calling [id number] from 630am to 4pm Mon thru Fri, utilizing the On-Call Finder functionality of Epic and searching for Perfusion, or by contacting the hospital  from 4pm to 630am and on weekends and asking to speak with the perfusionist on call.    4:44 PM

## 2025-07-13 NOTE — SUBJECTIVE & OBJECTIVE
Interval History: HM 3 with recent hx of C-diff admitted with persistent diarrhea and LFA.  He has not had LFA since admission. Hydralazine increased to 75mg TID yesterday. Will monitor response.  Appreciate GI and ID consultation.  C-diff negative so will stop  Vanc.  Supportive treatment with  Imodium,  Lomotil and probiotic.  Will monitor response to supportive care and likely discharge tomorrow.       Continuous Infusions:  Scheduled Meds:   amiodarone  400 mg Oral Daily    hydrALAZINE  10 mg Intravenous Once    hydrALAZINE  75 mg Oral Q8H    Lactobacillus rhamnosus GG  1 capsule Oral Daily    loperamide  4 mg Oral Once    potassium, sodium phosphates  1 packet Oral QID (AC & HS)    venlafaxine  37.5 mg Oral Daily     PRN Meds:  Current Facility-Administered Medications:     dextrose 50%, 12.5 g, Intravenous, PRN    dextrose 50%, 25 g, Intravenous, PRN    diphenoxylate-atropine 2.5-0.025 mg, 1 tablet, Oral, QID PRN    glucagon (human recombinant), 1 mg, Intramuscular, PRN    glucose, 16 g, Oral, PRN    glucose, 24 g, Oral, PRN    insulin aspart U-100, 0-5 Units, Subcutaneous, QID (AC + HS) PRN    Review of patient's allergies indicates:  No Known Allergies  Objective:     Vital Signs (Most Recent):  Temp: 99.1 °F (37.3 °C) (07/13/25 0742)  Pulse: 82 (07/13/25 1000)  Resp: 16 (07/13/25 0742)  BP: (!) 98/0 (07/13/25 0841)  SpO2: 95 % (07/13/25 0742) Vital Signs (24h Range):  Temp:  [98 °F (36.7 °C)-99.1 °F (37.3 °C)] 99.1 °F (37.3 °C)  Pulse:  [] 82  Resp:  [16-19] 16  SpO2:  [95 %-99 %] 95 %  BP: ()/(0-86) 98/0     Patient Vitals for the past 72 hrs (Last 3 readings):   Weight   07/12/25 1231 75.8 kg (167 lb 1.7 oz)   07/11/25 1623 75.8 kg (167 lb 1.7 oz)   07/11/25 1506 75.8 kg (167 lb)     Body mass index is 22.05 kg/m².    No intake or output data in the 24 hours ending 07/13/25 1103      Hemodynamic Parameters:       Telemetry: reviewed     Physical Exam  Vitals and nursing note reviewed.    Constitutional:       Appearance: Normal appearance.   HENT:      Head: Normocephalic.      Nose: Nose normal.      Mouth/Throat:      Mouth: Mucous membranes are moist.   Eyes:      Pupils: Pupils are equal, round, and reactive to light.   Neck:      Comments: No JVP elevation   Cardiovascular:      Comments: Smooth VAD hum  Pulmonary:      Effort: Pulmonary effort is normal.      Breath sounds: Normal breath sounds.   Abdominal:      General: Bowel sounds are normal. There is no distension.      Palpations: Abdomen is soft.   Musculoskeletal:         General: Normal range of motion.      Right lower leg: No edema.      Left lower leg: No edema.   Skin:     General: Skin is warm.      Capillary Refill: Capillary refill takes 2 to 3 seconds.   Neurological:      General: No focal deficit present.      Mental Status: He is alert and oriented to person, place, and time.   Psychiatric:         Mood and Affect: Mood normal.         Behavior: Behavior normal.            Significant Labs:  CBC:  Recent Labs   Lab 07/11/25 1634 07/12/25  0505 07/13/25  0600   WBC 4.64 3.95 4.55   RBC 4.54* 3.60* 3.92*   HGB 12.9* 10.3* 11.0*   HCT 40.8 32.2* 35.3*   * 108* 134*   MCV 90 89 90   MCH 28.4 28.6 28.1   MCHC 31.6* 32.0 31.2*     BNP:  Recent Labs   Lab 07/11/25  1634   *     CMP:  Recent Labs   Lab 07/11/25  1812 07/12/25  0505 07/12/25  0913 07/13/25  0600   GLU 99 74 75 69*   CALCIUM 7.5* 7.2* 7.8* 7.5*   ALBUMIN 2.3*  --   --   --    PROT 6.2  --   --   --     136 138 137   K 3.5 2.4* 2.8* 3.5   CO2 23 25 24 23    104 105 107   BUN 12 15 16 21   CREATININE 3.3* 4.0* 4.1* 4.9*   ALKPHOS 145  --   --   --    *  --   --   --    *  --   --   --    BILITOT 0.7  --   --   --       Coagulation:   Recent Labs   Lab 07/11/25  1634 07/12/25  0505 07/13/25  0600   INR 3.4* 3.4* 2.7*   APTT  --  58.4* 53.9*     LDH:  Recent Labs   Lab 07/11/25  1812 07/12/25  0505 07/13/25  0600   * 351*  382*     Microbiology:  Microbiology Results (last 7 days)       Procedure Component Value Units Date/Time    Clostridium difficile EIA [[id number]]  (Normal) Collected: 07/12/25 0246    Order Status: Completed Specimen: Stool Updated: 07/13/25 0542     C. DIFFICILE GDH AG Negative     Clostridioides difficile Toxin A/B Negative    Blood culture [[id number]] Collected: 07/11/25 1953    Order Status: Sent Specimen: Blood from Peripheral, Upper Arm, Right Updated: 07/11/25 2220    Blood culture [[id number]] Collected: 07/11/25 1953    Order Status: Sent Specimen: Blood from Peripheral, Antecubital, Right Updated: 07/11/25 2220    Influenza A & B by Molecular [[id number]]  (Normal) Collected: 07/11/25 1634    Order Status: Completed Specimen: Nasal Swab Updated: 07/11/25 1841     INFLUENZA A MOLECULAR Negative     INFLUENZA B MOLECULAR  Negative    Culture, Respiratory with Gram Stain [[id number]]     Order Status: Sent Specimen: Respiratory             I have reviewed all pertinent labs within the past 24 hours.    Estimated Creatinine Clearance: 16.5 mL/min (A) (based on SCr of 4.9 mg/dL (H)).    Diagnostic Results:  I have reviewed all pertinent imaging results/findings within the past 24 hours.

## 2025-07-13 NOTE — ASSESSMENT & PLAN NOTE
-Repeat C-diff studies negative  -GI and ID consulted and recommended supportive  -Imodium, Lomotil and  probiotic ordered.    -IV fluid bolus given on admit.

## 2025-07-13 NOTE — SUBJECTIVE & OBJECTIVE
Interval History: Continues to have multiple bowel movements.    Review of Systems   Gastrointestinal:  Positive for diarrhea.   All other systems reviewed and are negative.    Objective:     Vital Signs (Most Recent):  Temp: 99.1 °F (37.3 °C) (07/13/25 1100)  Pulse: 104 (07/13/25 1220)  Resp: 18 (07/13/25 1100)  BP: (!) 84/0 (07/13/25 1100)  SpO2: 99 % (07/13/25 1100) Vital Signs (24h Range):  Temp:  [98 °F (36.7 °C)-99.1 °F (37.3 °C)] 99.1 °F (37.3 °C)  Pulse:  [] 104  Resp:  [16-19] 18  SpO2:  [95 %-99 %] 99 %  BP: ()/(0-86) 84/0     Weight: 75.8 kg (167 lb 1.7 oz)  Body mass index is 22.05 kg/m².    Estimated Creatinine Clearance: 16.5 mL/min (A) (based on SCr of 4.9 mg/dL (H)).     Physical Exam  Vitals and nursing note reviewed.   Constitutional:       Appearance: Normal appearance.   HENT:      Head: Normocephalic and atraumatic.   Eyes:      General: No scleral icterus.        Right eye: No discharge.         Left eye: No discharge.   Pulmonary:      Effort: Pulmonary effort is normal.      Breath sounds: Normal breath sounds.   Abdominal:      General: There is no distension.      Palpations: Abdomen is soft.      Tenderness: There is no abdominal tenderness.      Comments: Dressings covering LVAD exit site.   Skin:     General: Skin is warm.   Neurological:      General: No focal deficit present.      Mental Status: He is alert and oriented to person, place, and time.          Significant Labs:   Microbiology Results (last 7 days)       Procedure Component Value Units Date/Time    Clostridium difficile EIA [[id number]]  (Normal) Collected: 07/12/25 0246    Order Status: Completed Specimen: Stool Updated: 07/13/25 0542     C. DIFFICILE GDH AG Negative     Clostridioides difficile Toxin A/B Negative    Blood culture [[id number]] Collected: 07/11/25 1953    Order Status: Sent Specimen: Blood from Peripheral, Upper Arm, Right Updated: 07/11/25 2220    Blood culture [[id number]] Collected: 07/11/25  1953    Order Status: Resulted Specimen: Blood from Peripheral, Antecubital, Right Updated: 07/11/25 2220    Influenza A & B by Molecular [[id number]]  (Normal) Collected: 07/11/25 1634    Order Status: Completed Specimen: Nasal Swab Updated: 07/11/25 1841     INFLUENZA A MOLECULAR Negative     INFLUENZA B MOLECULAR  Negative    Culture, Respiratory with Gram Stain [[id number]]     Order Status: Sent Specimen: Respiratory             Significant Imaging: I have reviewed all pertinent imaging results/findings within the past 24 hours.

## 2025-07-13 NOTE — PLAN OF CARE
Problem: Ventricular Assist Device  Goal: Optimal Adjustment to Device  Outcome: Progressing  Goal: Absence of Bleeding  Outcome: Progressing  Goal: Absence of Embolism Signs and Symptoms  Outcome: Progressing  Goal: Optimal Blood Flow  Outcome: Progressing  Goal: Absence of Infection Signs and Symptoms  Outcome: Progressing  Goal: Effective Right-Sided Heart Function  Outcome: Progressing     Problem: Fall Injury Risk  Goal: Absence of Fall and Fall-Related Injury  Outcome: Progressing     Problem: Fatigue  Goal: Improved Activity Tolerance  Outcome: Progressing

## 2025-07-14 VITALS
RESPIRATION RATE: 20 BRPM | TEMPERATURE: 98 F | WEIGHT: 167.13 LBS | SYSTOLIC BLOOD PRESSURE: 86 MMHG | HEIGHT: 73 IN | OXYGEN SATURATION: 97 % | HEART RATE: 79 BPM | BODY MASS INDEX: 22.15 KG/M2

## 2025-07-14 PROBLEM — T82.9XXA LEFT VENTRICULAR ASSIST DEVICE (LVAD) COMPLICATION, INITIAL ENCOUNTER: Status: ACTIVE | Noted: 2025-07-14

## 2025-07-14 LAB
ABSOLUTE EOSINOPHIL (OHS): 0.06 K/UL
ABSOLUTE MONOCYTE (OHS): 0.28 K/UL (ref 0.3–1)
ABSOLUTE NEUTROPHIL COUNT (OHS): 2.95 K/UL (ref 1.8–7.7)
ALBUMIN SERPL BCP-MCNC: 2 G/DL (ref 3.5–5.2)
ALP SERPL-CCNC: 124 UNIT/L (ref 40–150)
ALT SERPL W/O P-5'-P-CCNC: 102 UNIT/L (ref 10–44)
ANION GAP (OHS): 7 MMOL/L (ref 8–16)
APTT PPP: 55 SECONDS (ref 21–32)
AST SERPL-CCNC: 117 UNIT/L (ref 11–45)
BACTERIA STL CULT: NORMAL
BASOPHILS # BLD AUTO: 0.03 K/UL
BASOPHILS NFR BLD AUTO: 0.8 %
BILIRUB DIRECT SERPL-MCNC: 0.3 MG/DL (ref 0.1–0.3)
BILIRUB SERPL-MCNC: 0.6 MG/DL (ref 0.1–1)
BUN SERPL-MCNC: 21 MG/DL (ref 8–23)
CALCIUM SERPL-MCNC: 7.3 MG/DL (ref 8.7–10.5)
CHLORIDE SERPL-SCNC: 105 MMOL/L (ref 95–110)
CO2 SERPL-SCNC: 23 MMOL/L (ref 23–29)
CREAT SERPL-MCNC: 5.3 MG/DL (ref 0.5–1.4)
CRP SERPL-MCNC: 73.6 MG/L
ERYTHROCYTE [DISTWIDTH] IN BLOOD BY AUTOMATED COUNT: 20.7 % (ref 11.5–14.5)
GFR SERPLBLD CREATININE-BSD FMLA CKD-EPI: 11 ML/MIN/1.73/M2
GLUCOSE SERPL-MCNC: 70 MG/DL (ref 70–110)
HCT VFR BLD AUTO: 34 % (ref 40–54)
HGB BLD-MCNC: 10.7 GM/DL (ref 14–18)
IMM GRANULOCYTES # BLD AUTO: 0.02 K/UL (ref 0–0.04)
IMM GRANULOCYTES NFR BLD AUTO: 0.5 % (ref 0–0.5)
INR PPP: 2.9 (ref 0.8–1.2)
LDH SERPL-CCNC: 319 U/L (ref 110–260)
LYMPHOCYTES # BLD AUTO: 0.63 K/UL (ref 1–4.8)
MAGNESIUM SERPL-MCNC: 1.7 MG/DL (ref 1.6–2.6)
MCH RBC QN AUTO: 28.3 PG (ref 27–31)
MCHC RBC AUTO-ENTMCNC: 31.5 G/DL (ref 32–36)
MCV RBC AUTO: 90 FL (ref 82–98)
NUCLEATED RBC (/100WBC) (OHS): 0 /100 WBC
PHOSPHATE SERPL-MCNC: 2.2 MG/DL (ref 2.7–4.5)
PLATELET # BLD AUTO: 128 K/UL (ref 150–450)
PMV BLD AUTO: 10.7 FL (ref 9.2–12.9)
POCT GLUCOSE: 102 MG/DL (ref 70–110)
POCT GLUCOSE: 122 MG/DL (ref 70–110)
POCT GLUCOSE: 94 MG/DL (ref 70–110)
POTASSIUM SERPL-SCNC: 3.2 MMOL/L (ref 3.5–5.1)
PREALB SERPL-MCNC: 10 MG/DL (ref 20–43)
PROT SERPL-MCNC: 5.3 GM/DL (ref 6–8.4)
PROTHROMBIN TIME: 29.5 SECONDS (ref 9–12.5)
RBC # BLD AUTO: 3.78 M/UL (ref 4.6–6.2)
RELATIVE EOSINOPHIL (OHS): 1.5 %
RELATIVE LYMPHOCYTE (OHS): 15.9 % (ref 18–48)
RELATIVE MONOCYTE (OHS): 7.1 % (ref 4–15)
RELATIVE NEUTROPHIL (OHS): 74.2 % (ref 38–73)
SODIUM SERPL-SCNC: 135 MMOL/L (ref 136–145)
WBC # BLD AUTO: 3.97 K/UL (ref 3.9–12.7)

## 2025-07-14 PROCEDURE — 99233 SBSQ HOSP IP/OBS HIGH 50: CPT | Mod: ,,, | Performed by: STUDENT IN AN ORGANIZED HEALTH CARE EDUCATION/TRAINING PROGRAM

## 2025-07-14 PROCEDURE — 83735 ASSAY OF MAGNESIUM: CPT | Performed by: STUDENT IN AN ORGANIZED HEALTH CARE EDUCATION/TRAINING PROGRAM

## 2025-07-14 PROCEDURE — 85025 COMPLETE CBC W/AUTO DIFF WBC: CPT | Performed by: STUDENT IN AN ORGANIZED HEALTH CARE EDUCATION/TRAINING PROGRAM

## 2025-07-14 PROCEDURE — 85730 THROMBOPLASTIN TIME PARTIAL: CPT | Performed by: STUDENT IN AN ORGANIZED HEALTH CARE EDUCATION/TRAINING PROGRAM

## 2025-07-14 PROCEDURE — 25000003 PHARM REV CODE 250: Performed by: STUDENT IN AN ORGANIZED HEALTH CARE EDUCATION/TRAINING PROGRAM

## 2025-07-14 PROCEDURE — 80100014 HC HEMODIALYSIS 1:1

## 2025-07-14 PROCEDURE — 27000248 HC VAD-ADDITIONAL DAY

## 2025-07-14 PROCEDURE — 25000003 PHARM REV CODE 250: Performed by: INTERNAL MEDICINE

## 2025-07-14 PROCEDURE — 86140 C-REACTIVE PROTEIN: CPT | Performed by: STUDENT IN AN ORGANIZED HEALTH CARE EDUCATION/TRAINING PROGRAM

## 2025-07-14 PROCEDURE — 84134 ASSAY OF PREALBUMIN: CPT | Performed by: STUDENT IN AN ORGANIZED HEALTH CARE EDUCATION/TRAINING PROGRAM

## 2025-07-14 PROCEDURE — 80053 COMPREHEN METABOLIC PANEL: CPT | Performed by: STUDENT IN AN ORGANIZED HEALTH CARE EDUCATION/TRAINING PROGRAM

## 2025-07-14 PROCEDURE — 99233 SBSQ HOSP IP/OBS HIGH 50: CPT | Mod: ,,, | Performed by: THORACIC SURGERY (CARDIOTHORACIC VASCULAR SURGERY)

## 2025-07-14 PROCEDURE — 84100 ASSAY OF PHOSPHORUS: CPT | Performed by: STUDENT IN AN ORGANIZED HEALTH CARE EDUCATION/TRAINING PROGRAM

## 2025-07-14 PROCEDURE — 25000003 PHARM REV CODE 250: Performed by: PHYSICIAN ASSISTANT

## 2025-07-14 PROCEDURE — 82248 BILIRUBIN DIRECT: CPT | Performed by: STUDENT IN AN ORGANIZED HEALTH CARE EDUCATION/TRAINING PROGRAM

## 2025-07-14 PROCEDURE — 85610 PROTHROMBIN TIME: CPT | Performed by: STUDENT IN AN ORGANIZED HEALTH CARE EDUCATION/TRAINING PROGRAM

## 2025-07-14 PROCEDURE — 83615 LACTATE (LD) (LDH) ENZYME: CPT | Performed by: STUDENT IN AN ORGANIZED HEALTH CARE EDUCATION/TRAINING PROGRAM

## 2025-07-14 PROCEDURE — 36415 COLL VENOUS BLD VENIPUNCTURE: CPT | Performed by: STUDENT IN AN ORGANIZED HEALTH CARE EDUCATION/TRAINING PROGRAM

## 2025-07-14 RX ORDER — DIPHENOXYLATE HYDROCHLORIDE AND ATROPINE SULFATE 2.5; .025 MG/1; MG/1
2 TABLET ORAL 4 TIMES DAILY PRN
Qty: 120 TABLET | Refills: 0 | Status: SHIPPED | OUTPATIENT
Start: 2025-07-14

## 2025-07-14 RX ORDER — POTASSIUM CHLORIDE 750 MG/1
30 CAPSULE, EXTENDED RELEASE ORAL ONCE
Status: COMPLETED | OUTPATIENT
Start: 2025-07-14 | End: 2025-07-14

## 2025-07-14 RX ORDER — TRAZODONE HYDROCHLORIDE 50 MG/1
25 TABLET ORAL NIGHTLY PRN
Start: 2025-07-14

## 2025-07-14 RX ORDER — WARFARIN 1 MG/1
TABLET ORAL
Qty: 30 TABLET | Refills: 5 | Status: SHIPPED | OUTPATIENT
Start: 2025-07-14 | End: 2026-07-14

## 2025-07-14 RX ORDER — ACETAMINOPHEN 325 MG/1
650 TABLET ORAL EVERY 6 HOURS PRN
Status: DISCONTINUED | OUTPATIENT
Start: 2025-07-14 | End: 2025-07-14 | Stop reason: HOSPADM

## 2025-07-14 RX ORDER — DIPHENOXYLATE HYDROCHLORIDE AND ATROPINE SULFATE 2.5; .025 MG/1; MG/1
1-2 TABLET ORAL 4 TIMES DAILY PRN
Qty: 120 TABLET | Refills: 0 | Status: SHIPPED | OUTPATIENT
Start: 2025-07-14 | End: 2025-07-14

## 2025-07-14 RX ORDER — HYDRALAZINE HYDROCHLORIDE 25 MG/1
75 TABLET, FILM COATED ORAL EVERY 8 HOURS
Qty: 270 TABLET | Refills: 11 | Status: SHIPPED | OUTPATIENT
Start: 2025-07-14 | End: 2026-07-14

## 2025-07-14 RX ORDER — MUPIROCIN 20 MG/G
OINTMENT TOPICAL 2 TIMES DAILY
Status: DISCONTINUED | OUTPATIENT
Start: 2025-07-14 | End: 2025-07-14 | Stop reason: HOSPADM

## 2025-07-14 RX ADMIN — WARFARIN SODIUM 0.5 MG: 1 TABLET ORAL at 04:07

## 2025-07-14 RX ADMIN — VENLAFAXINE 37.5 MG: 37.5 TABLET ORAL at 08:07

## 2025-07-14 RX ADMIN — POTASSIUM & SODIUM PHOSPHATES POWDER PACK 280-160-250 MG 1 PACKET: 280-160-250 PACK at 06:07

## 2025-07-14 RX ADMIN — POTASSIUM & SODIUM PHOSPHATES POWDER PACK 280-160-250 MG 1 PACKET: 280-160-250 PACK at 11:07

## 2025-07-14 RX ADMIN — ACETAMINOPHEN 650 MG: 325 TABLET ORAL at 12:07

## 2025-07-14 RX ADMIN — AMIODARONE HYDROCHLORIDE 400 MG: 200 TABLET ORAL at 08:07

## 2025-07-14 RX ADMIN — POTASSIUM CHLORIDE 30 MEQ: 750 CAPSULE, EXTENDED RELEASE ORAL at 08:07

## 2025-07-14 RX ADMIN — HYDRALAZINE HYDROCHLORIDE 75 MG: 25 TABLET ORAL at 06:07

## 2025-07-14 RX ADMIN — HYDRALAZINE HYDROCHLORIDE 75 MG: 25 TABLET ORAL at 05:07

## 2025-07-14 RX ADMIN — Medication 1 CAPSULE: at 08:07

## 2025-07-14 NOTE — DISCHARGE SUMMARY
[facility] - Cardiology Stepdown  Heart Transplant  Discharge Summary      Patient Name: Radha Abbott  MRN: [id number]  Admission Date: 7/11/2025  Hospital Length of Stay: 3 days  Discharge Date and Time: 07/14/2025 12:43 PM  Attending Physician: Brayan Ocampo MD   Discharging Provider: DENIZ Faye  Primary Care Provider: Vasu Kong MD     HPI: 65 M PMHx of stage D HFrEF 2/2 ICM s/p HM3 as DT (12/1/2023), ESRD on HD (MWF), and debility here for ongoing diarrhea after treatment of c. difficile infection.      Pertinent history below:     5/9-11: Patient was recently hospitalized for anemia and C-difficile infection. At that time patient received a unit of blood, hydrated for low flow alarms, hydralazine given to target MAPs to 80s with Hydral 50mg TID, then put on extended vancomycin course with taper for discharge on 5/11  5 mLs (125 mg total) q6 hours for 9 days,   5 mLs (125 mg total) q12h day for 7 days,   5 mLs (125 mg total) qdaily for 7 days,   5 mLs (125 mg total) every other day for 14 days.     He was admitted again for ongoing diarrhea and low-flow alarms from 05/23 - 05/26/2025 and treated with IV fluids.  His last clinic visit with heart transplant Service with Dr. Snowden on 06/11/2023.  His C diff report was positive on August 2024, 05/10/2025.  He was negative for C diff infection on 05/23/2025, 07/10/2025     Patient is on hemodialysis for is ESRD Monday Wednesday Friday, he presented to the emergency room for feeling worsening of weakness since today morning after the dialysis.  Patient and his wife reports having diarrhea ongoing for few weeks and then loss of appetite for past 2-3 weeks.  Patient had low-flow alarms x6 today.  He is getting admitted to heart transplant Service.    * No surgery found *     Hospital Course: 65 year old male with a history stage D HFrEF 2/2 ICM s/p HM3 as DT (12/1/2023), ESRD on HD (MWF), and clostridium difficile colitis for which was received  treatment followed by a slow taper on oral vancomycin.  Shortly after completing his oral vancomycin taper, the patient developed diarrhea again.  Stool studies obtained on 7/10 were negative for C diff.  Patient presented for admission for evaluation of his ongoing diarrhea.  ID and GI were consulted.  Repeat  C diff testing on 7/12 was negative also.  Patient doesn't have C diff.  Stool studies for other infectious pathogens is negative so far.  Both Gi and ID suspect his persistent diarrhea may be related to significant disruption of his gut microbiome.  Recommend supportive care with probiotics, and Lomotil.     Nephrology was consulted during his stay for HD.    Patient discharged in stable condition with supportive treatment for his diarrhea      Goals of Care Treatment Preferences:  Code Status: Full Code    Health care agent: Pt's wife, Arlyn is [employer]  Health care agent number: No value filed.                   Consults (From admission, onward)          Status Ordering Provider     Inpatient consult to Nephrology  Once        Provider:  (Not yet assigned)    Completed LAUREEN, ARMIDA     Inpatient consult to Infectious Diseases  Once        Provider:  (Not yet assigned)    Completed LAUREEN, ARMIDA     Inpatient consult to Gastroenterology  Once        Provider:  (Not yet assigned)    Completed LAUREEN, ARMIDA     Inpatient consult to Infectious Diseases  Once        Provider:  (Not yet assigned)    Completed LAUREEN, ARMIDA     Inpatient consult to Registered Dietitian/Nutritionist  Once        Provider:  (Not yet assigned)    Completed LAUREEN, ARMIDA     Inpatient consult to Cardiology  Once        Provider:  (Not yet assigned)    Completed ALIYA HOOD            Significant Diagnostic Studies: see chart for full details     Pending Diagnostic Studies:       Procedure Component Value Units Date/Time    Urinalysis, Reflex to Urine Culture Urine, Clean Catch [[id number]]     Order Status: Sent Lab Status: No result      Specimen: Urine           Final Active Diagnoses:    Diagnosis Date Noted POA    PRINCIPAL PROBLEM:  Diarrhea of presumed infectious origin [R19.7] 08/02/2024 Yes    LVAD (left ventricular assist device) present [Z95.811] 12/01/2023 Not Applicable    Chronic combined systolic and diastolic CHF, NYHA class 4 [I50.42] 06/12/2024 Yes     Chronic    PAF (paroxysmal atrial fibrillation) [I48.0] 10/11/2023 Yes    CAD (coronary artery disease) [I25.10] 10/07/2023 Yes    Anemia of chronic disease [D63.8] 04/26/2024 Yes    Type 2 diabetes mellitus without complication, without long-term current use of insulin [E11.9] 10/07/2023 Yes    Clostridium difficile infection [A49.8] 08/02/2024 Yes    ESRD (end stage renal disease) [N18.6] 01/22/2024 Yes      Problems Resolved During this Admission:      Discharged Condition: stable    Disposition: Home or Self Care    Follow Up:    Patient Instructions:      Diet Cardiac     Diet renal     Notify your health care provider if you experience any of the following:  persistent nausea and vomiting or diarrhea     Notify your health care provider if you experience any of the following:  redness, tenderness, or signs of infection (pain, swelling, redness, odor or green/yellow discharge around incision site)     Notify your health care provider if you experience any of the following:  persistent dizziness, light-headedness, or visual disturbances     Activity as tolerated     Medications:  Reconciled Home Medications:      Medication List        START taking these medications      diphenoxylate-atropine 2.5-0.025 mg 2.5-0.025 mg per tablet  Commonly known as: LOMOTIL  Take 2 tablets by mouth 4 (four) times daily as needed for Diarrhea.     Lactobacillus rhamnosus GG 10 billion cell capsule  Commonly known as: CULTURELLE  Take 1 capsule by mouth once daily.  Start taking on: July 15, 2025            CHANGE how you take these medications      hydrALAZINE 25 MG tablet  Commonly known as:  APRESOLINE  Take 3 tablets (75 mg total) by mouth every 8 (eight) hours.  What changed:   medication strength  how much to take     traZODone 50 MG tablet  Commonly known as: DESYREL  Take 0.5 tablets (25 mg total) by mouth nightly as needed for Insomnia.  What changed:   when to take this  reasons to take this     warfarin 1 MG tablet  Commonly known as: COUMADIN  Take 1 tablet (1 mg total) by mouth every Tuesday, Thursday, Saturday, Sunday AND 0.5 tablets (0.5 mg total) every Mon, Wed, Fri. Or as directed by coumadin clinic.  What changed: See the new instructions.            CONTINUE taking these medications      amiodarone 400 MG tablet  Commonly known as: PACERONE  Take 1 tablet (400 mg total) by mouth once daily.     miscellaneous medical supply Kit  by Apply Externally route 2 (two) times a day. Use twice daily at 8 AM and 3 PM and record the value in MyChart as directed.     potassium, sodium phosphates 280-160-250 mg Pwpk  Commonly known as: PHOS-NAK  Take 1 packet by mouth 4 (four) times daily before meals and nightly.     venlafaxine 37.5 MG Tab  Commonly known as: EFFEXOR  Take 1 tablet (37.5 mg total) by mouth once daily.     vitamin D 1000 units Tab  Commonly known as: VITAMIN D3  Take 1 tablet by mouth once daily            STOP taking these medications      atorvastatin 40 MG tablet  Commonly known as: LIPITOR     Fish OiL 1,000 (120-180) mg Cap  Generic drug: omega 3-dha-epa-fish oil     pregabalin 100 MG capsule  Commonly known as: LYRICA     psyllium husk 3.4 gram Pwpk packet  Commonly known as: METAMUCIL     vancomycin 25 mg/mL oral solution              DENIZ Faye  Heart Transplant  [facility] - Cardiology Stepdown

## 2025-07-14 NOTE — SUBJECTIVE & OBJECTIVE
Subjective:     Post-Op Info:  * No surgery found *          Reason for Visit:  Patient is seen in follow up management of LVAD management    Interval History: Concerns for possible infectious diarrhea, 7 stools overnight.  2 so far this morning.  Possible discharge home today, getting dialysis at time of assessment      Medications:  Continuous Infusions:  Scheduled Meds:   amiodarone  400 mg Oral Daily    hydrALAZINE  10 mg Intravenous Once    hydrALAZINE  75 mg Oral Q8H    Lactobacillus rhamnosus GG  1 capsule Oral Daily    mupirocin   Nasal BID    potassium, sodium phosphates  1 packet Oral QID (AC & HS)    venlafaxine  37.5 mg Oral Daily    warfarin  0.5 mg Oral Daily     PRN Meds:  Current Facility-Administered Medications:     dextrose 50%, 12.5 g, Intravenous, PRN    dextrose 50%, 25 g, Intravenous, PRN    diphenoxylate-atropine 2.5-0.025 mg, 1 tablet, Oral, QID PRN    glucagon (human recombinant), 1 mg, Intramuscular, PRN    glucose, 16 g, Oral, PRN    glucose, 24 g, Oral, PRN    insulin aspart U-100, 0-5 Units, Subcutaneous, QID (AC + HS) PRN     Objective:     Vital Signs (Most Recent):  Temp: 99 °F (37.2 °C) (07/14/25 1145)  Pulse: 81 (07/14/25 1145)  Resp: 18 (07/14/25 1145)  BP: 92/69 (07/14/25 1148)  SpO2: 99 % (07/14/25 1145) Vital Signs (24h Range):  Temp:  [98 °F (36.7 °C)-99.4 °F (37.4 °C)] 99 °F (37.2 °C)  Pulse:  [61-86] 81  Resp:  [16-19] 18  SpO2:  [95 %-99 %] 99 %  BP: ()/(0-81) 92/69       Intake/Output Summary (Last 24 hours) at 7/14/2025 1224  Last data filed at 7/14/2025 0930  Gross per 24 hour   Intake 240 ml   Output --   Net 240 ml       Physical Exam  HENT:      Head: Normocephalic.   Eyes:      Extraocular Movements: Extraocular movements intact.   Cardiovascular:      Rate and Rhythm: Normal rate and regular rhythm.      Comments: LVAD hum is smooth  Pulmonary:      Effort: Pulmonary effort is normal.      Breath sounds: Normal breath sounds.   Abdominal:      General: Abdomen  is flat.      Palpations: Abdomen is soft.   Skin:     General: Skin is warm and dry.   Neurological:      General: No focal deficit present.         Significant Labs:  BMP:   Recent Labs   Lab 07/14/25 0218   GLU 70   *   K 3.2*      CO2 23   BUN 21   CREATININE 5.3*   CALCIUM 7.3*   MG 1.7     CBC:   Recent Labs   Lab 07/14/25 0218   WBC 3.97   RBC 3.78*   HGB 10.7*   HCT 34.0*   *   MCV 90   MCH 28.3   MCHC 31.5*     CMP:   Recent Labs   Lab 07/14/25 0218   GLU 70   CALCIUM 7.3*   ALBUMIN 2.0*   PROT 5.3*   *   K 3.2*   CO2 23      BUN 21   CREATININE 5.3*   ALKPHOS 124   *   *   BILITOT 0.6     Coagulation:   Recent Labs   Lab 07/14/25 0218   INR 2.9*   APTT 55.0*       Significant Diagnostics:  I have reviewed and interpreted all pertinent imaging results/findings within the past 24 hours.    Procedure: Device Interrogation Including analysis of device parameters  Current Settings: Ventricular Assist Device  Review of device function is stable      7/14/2025    11:58 AM 7/14/2025     8:00 AM 7/14/2025     6:32 AM 7/13/2025    11:31 PM 7/13/2025     7:36 PM 7/13/2025     3:04 PM 7/13/2025    11:53 AM   TXP LVAD INTERROGATIONS   Type HeartMate3 HeartMate3 HeartMate3 HeartMate3 HeartMate3 HeartMate3 HeartMate3   Flow 2.9 3.1 3.3 3.9 3.8 3.2 3.2   Speed 5000 5000 5000 5000 5000 5000 5000   PI 8.9 8.5 7.4 9.1 5 6.9 8.4   Power ([place]) 3.4 3.5 3.4 3.5 3.4 3.5 3.5   LSL 4600 4600 4600 4600 4600 4600 4600   Pulsatility Pulse Pulse  Intermittent pulse No Pulse Intermittent pulse Pulse

## 2025-07-14 NOTE — PROGRESS NOTES
DISCHARGE NOTE:    Radha Abbott is a 65 y.o. male s/p HM3 admitted for diarrhea. CDIF was negative and plan at discharge was lomotil and probiotics. Lipitor held for LFT elevation.     Pharmacy Interventions/Recommendations:  1) INR Goal: 2-3     2) Antiplatelet Agents: none    3) Heparin Bridging:  yes     4) INR Follow-Up/Discharge Needs:  Repeat INR Thursday    See list of discharge medication for dosing instructions.     Radha Abbott and his caregiver verbalized their understanding and had the opportunity to ask questions.      Discharge Medications:     Medication List        START taking these medications      diphenoxylate-atropine 2.5-0.025 mg 2.5-0.025 mg per tablet  Commonly known as: LOMOTIL  Take 2 tablets by mouth 4 (four) times daily as needed for Diarrhea.     Lactobacillus rhamnosus GG 10 billion cell capsule  Commonly known as: CULTURELLE  Take 1 capsule by mouth once daily.  Start taking on: July 15, 2025            CHANGE how you take these medications      hydrALAZINE 25 MG tablet  Commonly known as: APRESOLINE  Take 3 tablets (75 mg total) by mouth every 8 (eight) hours.  What changed:   medication strength  how much to take     traZODone 50 MG tablet  Commonly known as: DESYREL  Take 0.5 tablets (25 mg total) by mouth nightly as needed for Insomnia.  What changed:   when to take this  reasons to take this     warfarin 1 MG tablet  Commonly known as: COUMADIN  Take 1 tablet (1 mg total) by mouth every Tuesday, Thursday, Saturday, Sunday AND 0.5 tablets (0.5 mg total) every Mon, Wed, Fri. Or as directed by coumadin clinic.  What changed: See the new instructions.            CONTINUE taking these medications      amiodarone 400 MG tablet  Commonly known as: PACERONE  Take 1 tablet (400 mg total) by mouth once daily.     miscellaneous medical supply Kit  by Apply Externally route 2 (two) times a day. Use twice daily at 8 AM and 3 PM and record the value in [employer] as directed.     potassium, sodium  phosphates 280-160-250 mg Pwpk  Commonly known as: PHOS-NAK  Take 1 packet by mouth 4 (four) times daily before meals and nightly.     venlafaxine 37.5 MG Tab  Commonly known as: EFFEXOR  Take 1 tablet (37.5 mg total) by mouth once daily.     vitamin D 1000 units Tab  Commonly known as: VITAMIN D3  Take 1 tablet by mouth once daily            STOP taking these medications      atorvastatin 40 MG tablet  Commonly known as: LIPITOR     Fish OiL 1,000 (120-180) mg Cap  Generic drug: omega 3-dha-epa-fish oil     pregabalin 100 MG capsule  Commonly known as: LYRICA     psyllium husk 3.4 gram Pwpk packet  Commonly known as: METAMUCIL     vancomycin 25 mg/mL oral solution               Where to Get Your Medications        These medications were sent to [facility]  [address] [place] [id number]      Hours: Always Open Phone: [phone]   diphenoxylate-atropine 2.5-0.025 mg 2.5-0.025 mg per tablet  hydrALAZINE 25 MG tablet  Lactobacillus rhamnosus GG 10 billion cell capsule  warfarin 1 MG tablet       Information about where to get these medications is not yet available    Ask your nurse or doctor about these medications  traZODone 50 MG tablet

## 2025-07-14 NOTE — PT/OT/SLP PROGRESS
Occupational Therapy      Patient Name:  Radha Abbott   MRN:  [id number]    Patient not seen today secondary to RN hold 2* pt on dialysis until 4pm this date and then anticipates discharge home. Will follow-up 7/15/25 or as medically ready if pt remains in acute services.    7/14/2025

## 2025-07-14 NOTE — HOSPITAL COURSE
65 year old male with a history stage D HFrEF 2/2 ICM s/p HM3 as DT (12/1/2023), ESRD on HD (MWF), and clostridium difficile colitis for which was received treatment followed by a slow taper on oral vancomycin.  Shortly after completing his oral vancomycin taper, the patient developed diarrhea again.  Stool studies obtained on 7/10 were negative for C diff.  Patient presented for admission for evaluation of his ongoing diarrhea.  ID and GI were consulted.  Repeat  C diff testing on 7/12 was negative also.  Patient doesn't have C diff.  Stool studies for other infectious pathogens is negative so far.  Both Gi and ID suspect his persistent diarrhea may be related to significant disruption of his gut microbiome.  Recommend supportive care with probiotics, and Lomotil.     Nephrology was consulted during his stay for HD.    Patient discharged in stable condition with supportive treatment for his diarrhea

## 2025-07-14 NOTE — PROGRESS NOTES
[facility] - Cardiology Stepdown  Nephrology  Progress Note    Patient Name: Radha Abbott  MRN: [id number]  Admission Date: 7/11/2025  Hospital Length of Stay: 3 days  Attending Provider: Brayan Ocampo MD   Primary Care Physician: Vasu Kong MD  Principal Problem:Diarrhea of presumed infectious origin    Subjective:     HPI: 65 M PMHx of stage D HFrEF 2/2 ICM s/p HM3 as DT (12/1/2023), ESRD on HD (MWF), and debility here for ongoing diarrhea after treatment of c. difficile infection. Recent admissions for ongoing diarrhea. Pt c/o of weakness after HD yesterday. 1.5L removed yesterday with HD. Also experienced low flow alarms x 6. K 2.4. Nephrology consulted for ESRD.     Interval History: Patient evaluated at bedside this morning with family at bedside. He continues to have multiple bowel movements but family reports these are more formed when compared to prior.     Review of patient's allergies indicates:  No Known Allergies  Current Facility-Administered Medications   Medication Frequency    acetaminophen tablet 650 mg Q6H PRN    amiodarone tablet 400 mg Daily    dextrose 50% injection 12.5 g PRN    dextrose 50% injection 25 g PRN    diphenoxylate-atropine 2.5-0.025 mg per tablet 1 tablet QID PRN    glucagon (human recombinant) injection 1 mg PRN    glucose chewable tablet 16 g PRN    glucose chewable tablet 24 g PRN    hydrALAZINE injection 10 mg Once    hydrALAZINE tablet 75 mg Q8H    insulin aspart U-100 pen 0-5 Units QID (AC + HS) PRN    Lactobacillus rhamnosus GG capsule 1 capsule Daily    mupirocin 2 % ointment BID    potassium, sodium phosphates 280-160-250 mg packet 1 packet QID (AC & HS)    venlafaxine tablet 37.5 mg Daily    warfarin (COUMADIN) split tablet 0.5 mg Daily       Objective:     Vital Signs (Most Recent):  Temp: 99 °F (37.2 °C) (07/14/25 1145)  Pulse: 80 (07/14/25 1245)  Resp: 20 (07/14/25 1245)  BP: (!) 78/0 (07/14/25 1245)  SpO2: 99 % (07/14/25 1145) Vital Signs (24h Range):  Temp:   [98 °F (36.7 °C)-99.4 °F (37.4 °C)] 99 °F (37.2 °C)  Pulse:  [61-86] 80  Resp:  [16-20] 20  SpO2:  [95 %-99 %] 99 %  BP: ()/(0-81) 78/0     Weight: 75.8 kg (167 lb 1.7 oz) (07/12/25 1231)  Body mass index is 22.05 kg/m².  Body surface area is 1.98 meters squared.    No intake/output data recorded.     Physical Exam  Vitals and nursing note reviewed.   Constitutional:       Appearance: He is ill-appearing (chronically).   Eyes:      General: No scleral icterus.  Cardiovascular:      Rate and Rhythm: Normal rate.   Pulmonary:      Effort: Pulmonary effort is normal. No respiratory distress.   Musculoskeletal:         General: Deformity: tunneled HD cath.      Right lower leg: No edema.      Left lower leg: No edema.   Neurological:      Mental Status: He is alert.          Significant Labs:  All labs within the past 24 hours have been reviewed.     Significant Imaging:  Labs: Reviewed  Assessment/Plan:     Cardiac/Vascular  LVAD (left ventricular assist device) present  - per primary     Renal/  ESRD (end stage renal disease)  ESRD on iHD MWF  [facility] ([facility])  3 hours  EDW:  82.1 kg  [facility]  Last HD prior to presentation 7/11/25     -iHD per MWF at bedside today   -1.5L fluid restrictions  -continue strict I/O's  -Renal diet as tolerated          Oncology  Anemia of chronic disease  -On Micera as OP q 2 weeks;  defer VALENTIN to his OP dialysis unit  -PRN transfusion per primary team    GI  * Diarrhea of presumed infectious origin  Hx of C.diff; repeat cultures from this admission negative  - Management per primary       Patient is at high risk for mortality in setting of persistent diarrhea, hypotension, ESRD on HD, and ischemic cardiomyopathy, requiring LVAD and other chronic medical conditions.     Thank you for your consult. I will follow-up with patient. Please contact us if you have any additional questions.    Alla Christensen MD  Nephrology PGY-4  [facility] - Cardiology Stepdown

## 2025-07-14 NOTE — CARE UPDATE
Unit [facility] Care Support Interaction      I have reviewed the chart of Radha Abbott who is hospitalized for Diarrhea of presumed infectious origin. The patient is currently located in the following unit: CSU        I have assisted the primary physician in management of the following:      MRSA Decolonization - Mupirocin ordered and CHG ordered     Vero Lozada PA-C  Unit [facility]

## 2025-07-14 NOTE — SUBJECTIVE & OBJECTIVE
Interval History: HM 3 with recent hx of C-diff admitted with persistent diarrhea and LFA.  He has not had LFA since admission. Hydralazine increased to 75mg TID with good response.  Appreciate GI and ID consultation.  C-diff negative and Vanc stopped.  Supportive treatment with  Imodium, Lomotil and probiotic.  He will get HD at noon today then we will plan for discharge for supportive care of diarrhea.       Continuous Infusions:  Scheduled Meds:   amiodarone  400 mg Oral Daily    hydrALAZINE  10 mg Intravenous Once    hydrALAZINE  75 mg Oral Q8H    Lactobacillus rhamnosus GG  1 capsule Oral Daily    mupirocin   Nasal BID    potassium, sodium phosphates  1 packet Oral QID (AC & HS)    venlafaxine  37.5 mg Oral Daily    warfarin  0.5 mg Oral Daily     PRN Meds:  Current Facility-Administered Medications:     dextrose 50%, 12.5 g, Intravenous, PRN    dextrose 50%, 25 g, Intravenous, PRN    diphenoxylate-atropine 2.5-0.025 mg, 1 tablet, Oral, QID PRN    glucagon (human recombinant), 1 mg, Intramuscular, PRN    glucose, 16 g, Oral, PRN    glucose, 24 g, Oral, PRN    insulin aspart U-100, 0-5 Units, Subcutaneous, QID (AC + HS) PRN    Review of patient's allergies indicates:  No Known Allergies  Objective:     Vital Signs (Most Recent):  Temp: 99 °F (37.2 °C) (07/14/25 0758)  Pulse: 75 (07/14/25 0758)  Resp: 18 (07/14/25 0758)  BP: 107/81 (07/14/25 0800)  SpO2: 99 % (07/14/25 0758) Vital Signs (24h Range):  Temp:  [98 °F (36.7 °C)-99.4 °F (37.4 °C)] 99 °F (37.2 °C)  Pulse:  [] 75  Resp:  [16-19] 18  SpO2:  [95 %-99 %] 99 %  BP: ()/(0-81) 107/81     Patient Vitals for the past 72 hrs (Last 3 readings):   Weight   07/12/25 1231 75.8 kg (167 lb 1.7 oz)   07/11/25 1623 75.8 kg (167 lb 1.7 oz)   07/11/25 1506 75.8 kg (167 lb)     Body mass index is 22.05 kg/m².    No intake or output data in the 24 hours ending 07/14/25 0942      Hemodynamic Parameters:       Telemetry: reviewed     Physical Exam  Vitals and  nursing note reviewed.   Constitutional:       Appearance: Normal appearance.   HENT:      Head: Normocephalic.      Nose: Nose normal.      Mouth/Throat:      Mouth: Mucous membranes are moist.   Eyes:      Pupils: Pupils are equal, round, and reactive to light.   Neck:      Comments: No JVP elevation   Cardiovascular:      Comments: Smooth VAD hum  Pulmonary:      Effort: Pulmonary effort is normal.      Breath sounds: Normal breath sounds.   Abdominal:      General: Bowel sounds are normal. There is no distension.      Palpations: Abdomen is soft.   Musculoskeletal:         General: Normal range of motion.      Right lower leg: No edema.      Left lower leg: No edema.   Skin:     General: Skin is warm.      Capillary Refill: Capillary refill takes 2 to 3 seconds.   Neurological:      General: No focal deficit present.      Mental Status: He is alert and oriented to person, place, and time.   Psychiatric:         Mood and Affect: Mood normal.         Behavior: Behavior normal.            Significant Labs:  CBC:  Recent Labs   Lab 07/12/25  0505 07/13/25  0600 07/14/25  0218   WBC 3.95 4.55 3.97   RBC 3.60* 3.92* 3.78*   HGB 10.3* 11.0* 10.7*   HCT 32.2* 35.3* 34.0*   * 134* 128*   MCV 89 90 90   MCH 28.6 28.1 28.3   MCHC 32.0 31.2* 31.5*     BNP:  Recent Labs   Lab 07/11/25  1634   *     CMP:  Recent Labs   Lab 07/11/25  1812 07/12/25  0505 07/12/25  0913 07/13/25  0600 07/14/25  0218   GLU 99   < > 75 69* 70   CALCIUM 7.5*   < > 7.8* 7.5* 7.3*   ALBUMIN 2.3*  --   --   --  2.0*   PROT 6.2  --   --   --  5.3*      < > 138 137 135*   K 3.5   < > 2.8* 3.5 3.2*   CO2 23   < > 24 23 23      < > 105 107 105   BUN 12   < > 16 21 21   CREATININE 3.3*   < > 4.1* 4.9* 5.3*   ALKPHOS 145  --   --   --  124   *  --   --   --  102*   *  --   --   --  117*   BILITOT 0.7  --   --   --  0.6    < > = values in this interval not displayed.      Coagulation:   Recent Labs   Lab 07/12/25  6530  07/13/25  0600 07/14/25  0218   INR 3.4* 2.7* 2.9*   APTT 58.4* 53.9* 55.0*     LDH:  Recent Labs   Lab 07/11/25  1812 07/12/25  0505 07/13/25  0600 07/14/25  0218   * 351* 382* 319*     Microbiology:  Microbiology Results (last 7 days)       Procedure Component Value Units Date/Time    Blood culture [[id number]]  (Normal) Collected: 07/11/25 1953    Order Status: Completed Specimen: Blood from Peripheral, Upper Arm, Right Updated: 07/13/25 2002     Blood Culture No Growth After 6 Hours    Blood culture [[id number]]  (Normal) Collected: 07/11/25 1953    Order Status: Completed Specimen: Blood from Peripheral, Antecubital, Right Updated: 07/13/25 2002     Blood Culture No Growth After 6 Hours    Clostridium difficile EIA [[id number]]  (Normal) Collected: 07/12/25 0246    Order Status: Completed Specimen: Stool Updated: 07/13/25 0542     C. DIFFICILE GDH AG Negative     Clostridioides difficile Toxin A/B Negative    Influenza A & B by Molecular [[id number]]  (Normal) Collected: 07/11/25 1634    Order Status: Completed Specimen: Nasal Swab Updated: 07/11/25 1841     INFLUENZA A MOLECULAR Negative     INFLUENZA B MOLECULAR  Negative    Culture, Respiratory with Gram Stain [[id number]]     Order Status: Canceled Specimen: Respiratory             I have reviewed all pertinent labs within the past 24 hours.    Estimated Creatinine Clearance: 15.3 mL/min (A) (based on SCr of 5.3 mg/dL (H)).    Diagnostic Results:  I have reviewed all pertinent imaging results/findings within the past 24 hours.

## 2025-07-14 NOTE — PROGRESS NOTES
[facility] - Cardiology Stepdown  Cardiothoracic Surgery  Evaluation and Management/VAD interrogation       Patient Name: Radha Abbott  MRN: [id number]  Admission Date: 7/11/2025  Hospital Length of Stay: 3 days  Code Status: Full Code   Attending Physician: Brayan Ocampo MD   Referring Provider: Self, Aaareferral  Principal Problem:Diarrhea of presumed infectious origin    Subjective:     Post-Op Info:  * No surgery found *         Subjective:     Post-Op Info:  * No surgery found *          Reason for Visit:  Patient is seen in follow up management of LVAD management    Interval History: Concerns for possible infectious diarrhea, 7 stools overnight.  2 so far this morning.  Possible discharge home today, getting dialysis at time of assessment      Medications:  Continuous Infusions:  Scheduled Meds:   amiodarone  400 mg Oral Daily    hydrALAZINE  10 mg Intravenous Once    hydrALAZINE  75 mg Oral Q8H    Lactobacillus rhamnosus GG  1 capsule Oral Daily    mupirocin   Nasal BID    potassium, sodium phosphates  1 packet Oral QID (AC & HS)    venlafaxine  37.5 mg Oral Daily    warfarin  0.5 mg Oral Daily     PRN Meds:  Current Facility-Administered Medications:     dextrose 50%, 12.5 g, Intravenous, PRN    dextrose 50%, 25 g, Intravenous, PRN    diphenoxylate-atropine 2.5-0.025 mg, 1 tablet, Oral, QID PRN    glucagon (human recombinant), 1 mg, Intramuscular, PRN    glucose, 16 g, Oral, PRN    glucose, 24 g, Oral, PRN    insulin aspart U-100, 0-5 Units, Subcutaneous, QID (AC + HS) PRN     Objective:     Vital Signs (Most Recent):  Temp: 99 °F (37.2 °C) (07/14/25 1145)  Pulse: 81 (07/14/25 1145)  Resp: 18 (07/14/25 1145)  BP: 92/69 (07/14/25 1148)  SpO2: 99 % (07/14/25 1145) Vital Signs (24h Range):  Temp:  [98 °F (36.7 °C)-99.4 °F (37.4 °C)] 99 °F (37.2 °C)  Pulse:  [61-86] 81  Resp:  [16-19] 18  SpO2:  [95 %-99 %] 99 %  BP: ()/(0-81) 92/69       Intake/Output Summary (Last 24 hours) at 7/14/2025 1224  Last data  filed at 7/14/2025 0930  Gross per 24 hour   Intake 240 ml   Output --   Net 240 ml       Physical Exam  HENT:      Head: Normocephalic.   Eyes:      Extraocular Movements: Extraocular movements intact.   Cardiovascular:      Rate and Rhythm: Normal rate and regular rhythm.      Comments: LVAD hum is smooth  Pulmonary:      Effort: Pulmonary effort is normal.      Breath sounds: Normal breath sounds.   Abdominal:      General: Abdomen is flat.      Palpations: Abdomen is soft.   Skin:     General: Skin is warm and dry.   Neurological:      General: No focal deficit present.         Significant Labs:  BMP:   Recent Labs   Lab 07/14/25 0218   GLU 70   *   K 3.2*      CO2 23   BUN 21   CREATININE 5.3*   CALCIUM 7.3*   MG 1.7     CBC:   Recent Labs   Lab 07/14/25 0218   WBC 3.97   RBC 3.78*   HGB 10.7*   HCT 34.0*   *   MCV 90   MCH 28.3   MCHC 31.5*     CMP:   Recent Labs   Lab 07/14/25 0218   GLU 70   CALCIUM 7.3*   ALBUMIN 2.0*   PROT 5.3*   *   K 3.2*   CO2 23      BUN 21   CREATININE 5.3*   ALKPHOS 124   *   *   BILITOT 0.6     Coagulation:   Recent Labs   Lab 07/14/25 0218   INR 2.9*   APTT 55.0*       Significant Diagnostics:  I have reviewed and interpreted all pertinent imaging results/findings within the past 24 hours.    Procedure: Device Interrogation Including analysis of device parameters  Current Settings: Ventricular Assist Device  Review of device function is stable      7/14/2025    11:58 AM 7/14/2025     8:00 AM 7/14/2025     6:32 AM 7/13/2025    11:31 PM 7/13/2025     7:36 PM 7/13/2025     3:04 PM 7/13/2025    11:53 AM   TXP LVAD INTERROGATIONS   Type HeartMate3 HeartMate3 HeartMate3 HeartMate3 HeartMate3 HeartMate3 HeartMate3   Flow 2.9 3.1 3.3 3.9 3.8 3.2 3.2   Speed 5000 5000 5000 5000 5000 5000 5000   PI 8.9 8.5 7.4 9.1 5 6.9 8.4   Power ([place]) 3.4 3.5 3.4 3.5 3.4 3.5 3.5   LSL 4600 4600 4600 4600 4600 4600 4600   Pulsatility Pulse Pulse   Intermittent pulse No Pulse Intermittent pulse Pulse       Assessment/Plan:     LVAD (left ventricular assist device) present  - Admitted for Diarrhea and possible concerns for infectious origin   - Implant Date: 12/1/2023  - Speed: 5000  - Interval History was obtained from team member during rounding today.  - VAD/MAKAYLA teaching was performed with patient.  - Mobilization/Physical Therapy is ongoing.  - Anticoagulation: Coumadin  - INR: 2.9  - BUN: 21  - Creat: 5.3  - LDH: 319  - WBC 3.97  - Stool collection negative for c-diff   - ID consulted but signed off after c-diff and stool testing was negative for infectious testing           More than 50 percent of the care dominated counseling and coordinating care with different team members. The VAD was interrogated and no significant findings were found in the history. All these findings are documented in the note above.          Jackie Ruff, ALETHA  Cardiothoracic Surgery  [facility] - Cardiology Stepdown    I agree with the above plan and have seen and examined the patient myself. I have also reviewed pertinent labs and studies on this patient. Interrogation of Ventricular assist device was performed with physician analysis of device parameters and review of device function. I have personally reviewed the interrogation findings and agree with findings as stated.

## 2025-07-14 NOTE — ASSESSMENT & PLAN NOTE
- Admitted for Diarrhea and possible concerns for infectious origin   - Implant Date: 12/1/2023  - Speed: 5000  - Interval History was obtained from team member during rounding today.  - VAD/MAKAYLA teaching was performed with patient.  - Mobilization/Physical Therapy is ongoing.  - Anticoagulation: Coumadin  - INR: 2.9  - BUN: 21  - Creat: 5.3  - LDH: 319  - WBC 3.97  - Stool collection negative for c-diff   - ID consulted but signed off after c-diff and stool testing was negative for infectious testing           More than 50 percent of the care dominated counseling and coordinating care with different team members. The VAD was interrogated and no significant findings were found in the history. All these findings are documented in the note above.

## 2025-07-14 NOTE — PLAN OF CARE
Problem: Ventricular Assist Device  Goal: Optimal Adjustment to Device  Outcome: Progressing  Goal: Absence of Bleeding  Outcome: Progressing  Goal: Absence of Embolism Signs and Symptoms  Outcome: Progressing  Goal: Optimal Blood Flow  Outcome: Progressing  Goal: Absence of Infection Signs and Symptoms  Outcome: Progressing  Goal: Effective Right-Sided Heart Function  Outcome: Progressing     Problem: Fall Injury Risk  Goal: Absence of Fall and Fall-Related Injury  Outcome: Progressing     Problem: Hemodialysis  Goal: Safe, Effective Therapy Delivery  Outcome: Progressing  Goal: Effective Tissue Perfusion  Outcome: Progressing  Goal: Absence of Infection Signs and Symptoms  Outcome: Progressing

## 2025-07-14 NOTE — ASSESSMENT & PLAN NOTE
-On Micera as OP q 2 weeks;  defer VALENTIN to his OP dialysis unit  -PRN transfusion per primary team

## 2025-07-14 NOTE — NURSING
Notified Ann Marie GUAMAN map 108 during dialysis at 1630 per dialysis chart, dialysis done around 5 pm, recheck BP at 5 pm map 86, doppler 86, 2 pm hydralazine was hold at 2 pm d/ pt was on dialysis, and Ann Marie.PA is ok to give now since dialysis just finished. Deondre is ok to discharge pt.

## 2025-07-14 NOTE — PLAN OF CARE
VSS. LVAD inventory done. LVAD dressing CDI. LVAD number and doppler WNL. Pt's wife and pt given discharge instruction, medication reviewed with the pt, follow up appts reviewed. All questions and concerns addressed. Pt verbalized understanding. IV, telemetry removed. Discharge paperwork given to patient. Pt in room waiting for transport. 1810: pt left with wife and belongings.

## 2025-07-14 NOTE — PT/OT/SLP PROGRESS
Physical Therapy      Patient Name:  Radha Abbott   MRN:  [id number]    Patient not seen today. RN reported that patient on HD until 4pm and anticipates discharge to home with HH after completion of HD. Will follow-up per POC if pt remains in the hospital.

## 2025-07-14 NOTE — ASSESSMENT & PLAN NOTE
-HeartMate 3 Implanted  12/1/2023  as DT  -Hold Coumadin, Goal INR 2.0-3.0   therapeutic today. Home dose is 1mg Qdaily   -Antiplatelets Not on ASA  -LDH is stable overall today. Will continue to monitor daily.  -Speed set at 5000, LSL 4600 rpm  -Interrogation notable for LFA likely in the setting of HTN.  Will monitor with addition antihypertensives   -Not listed for OHTx    Procedure: Device Interrogation Including analysis of device parameters  Current Settings: Ventricular Assist Device  Review of device function is stable      7/14/2025     8:00 AM 7/14/2025     6:32 AM 7/13/2025    11:31 PM 7/13/2025     7:36 PM 7/13/2025     3:04 PM 7/13/2025    11:53 AM 7/13/2025     7:45 AM   TXP LVAD INTERROGATIONS   Type HeartMate3 HeartMate3 HeartMate3 HeartMate3 HeartMate3 HeartMate3 HeartMate3   Flow 3.1 3.3 3.9 3.8 3.2 3.2 3.3   Speed 5000 5000 5000 5000 5000 5000 5000   PI 8.5 7.4 9.1 5 6.9 8.4 7.3   Power ([place]) 3.5 3.4 3.5 3.4 3.5 3.5 3.5   LSL 4600 4600 4600 4600 4600 4600 4600   Pulsatility Pulse  Intermittent pulse No Pulse Intermittent pulse Pulse Pulse

## 2025-07-14 NOTE — NURSING
Notified Ann Marie.PA unable to get daily weight d/t pt is too weak to stand long enough to get standing weight this AM and bed scale broke.

## 2025-07-14 NOTE — PROGRESS NOTES
07/14/2025  Alexus Felton    Current provider:  Brayan Ocampo MD    Device interrogation:      7/14/2025    11:58 AM 7/14/2025     8:00 AM 7/14/2025     6:32 AM 7/13/2025    11:31 PM 7/13/2025     7:36 PM 7/13/2025     3:04 PM 7/13/2025    11:53 AM   TXP LVAD INTERROGATIONS   Type HeartMate3 HeartMate3 HeartMate3 HeartMate3 HeartMate3 HeartMate3 HeartMate3   Flow 2.9 3.1 3.3 3.9 3.8 3.2 3.2   Speed 5000 5000 5000 5000 5000 5000 5000   PI 8.9 8.5 7.4 9.1 5 6.9 8.4   Power ([place]) 3.4 3.5 3.4 3.5 3.4 3.5 3.5   LSL 4600 4600 4600 4600 4600 4600 4600   Pulsatility Pulse Pulse  Intermittent pulse No Pulse Intermittent pulse Pulse          Rounded on [facility] to ensure all mechanical assist device settings (IABP or VAD) were appropriate and all parameters were within limits.  I was able to ensure all back up equipment was present, the staff had no issues, and the Perfusion Department daily rounding was complete.      For implantable VADs: Interrogation of Ventricular assist device was performed with analysis of device parameters and review of device function. I have personally reviewed the interrogation findings and agree with findings as stated.     In emergency, the nursing units have been notified to contact the perfusion department either by:  Calling [id number] from 630am to 4pm Mon thru Fri, utilizing the On-Call Finder functionality of Epic and searching for Perfusion, or by contacting the hospital  from 4pm to 630am and on weekends and asking to speak with the perfusionist on call.    12:20 PM

## 2025-07-14 NOTE — SUBJECTIVE & OBJECTIVE
Interval History: Patient evaluated at bedside this morning with family at bedside. He continues to have multiple bowel movements but family reports these are more formed when compared to prior.     Review of patient's allergies indicates:  No Known Allergies  Current Facility-Administered Medications   Medication Frequency    acetaminophen tablet 650 mg Q6H PRN    amiodarone tablet 400 mg Daily    dextrose 50% injection 12.5 g PRN    dextrose 50% injection 25 g PRN    diphenoxylate-atropine 2.5-0.025 mg per tablet 1 tablet QID PRN    glucagon (human recombinant) injection 1 mg PRN    glucose chewable tablet 16 g PRN    glucose chewable tablet 24 g PRN    hydrALAZINE injection 10 mg Once    hydrALAZINE tablet 75 mg Q8H    insulin aspart U-100 pen 0-5 Units QID (AC + HS) PRN    Lactobacillus rhamnosus GG capsule 1 capsule Daily    mupirocin 2 % ointment BID    potassium, sodium phosphates 280-160-250 mg packet 1 packet QID (AC & HS)    venlafaxine tablet 37.5 mg Daily    warfarin (COUMADIN) split tablet 0.5 mg Daily       Objective:     Vital Signs (Most Recent):  Temp: 99 °F (37.2 °C) (07/14/25 1145)  Pulse: 80 (07/14/25 1245)  Resp: 20 (07/14/25 1245)  BP: (!) 78/0 (07/14/25 1245)  SpO2: 99 % (07/14/25 1145) Vital Signs (24h Range):  Temp:  [98 °F (36.7 °C)-99.4 °F (37.4 °C)] 99 °F (37.2 °C)  Pulse:  [61-86] 80  Resp:  [16-20] 20  SpO2:  [95 %-99 %] 99 %  BP: ()/(0-81) 78/0     Weight: 75.8 kg (167 lb 1.7 oz) (07/12/25 1231)  Body mass index is 22.05 kg/m².  Body surface area is 1.98 meters squared.    No intake/output data recorded.     Physical Exam  Vitals and nursing note reviewed.   Constitutional:       Appearance: He is ill-appearing (chronically).   Eyes:      General: No scleral icterus.  Cardiovascular:      Rate and Rhythm: Normal rate.   Pulmonary:      Effort: Pulmonary effort is normal. No respiratory distress.   Musculoskeletal:         General: Deformity: tunneled HD cath.      Right lower  leg: No edema.      Left lower leg: No edema.   Neurological:      Mental Status: He is alert.          Significant Labs:  All labs within the past 24 hours have been reviewed.     Significant Imaging:  Labs: Reviewed

## 2025-07-14 NOTE — NURSING
Notified Ann Marie.PA pt c/o 5/10 RT foot pain, he said like never pain, intermittently, started yesterday. Tylenol ordered.

## 2025-07-14 NOTE — NURSING
Notified Ann Marie.PA map 90, doppler 90, asymptomatic, K 3.2, 30 mEq k ordered. Called Dialysis unit, Yolanda said pt will get HD around noon.

## 2025-07-14 NOTE — PROGRESS NOTES
Bedside HD completed, blood returned and catheter ports flushed with normal saline, heparin instilled into each port as indicated, ports capped and secured, post B/P 92/0, pulse 80, o2 sat on room  air 99%, patient awake and alert

## 2025-07-14 NOTE — PROGRESS NOTES
Bedside HD initiated, /800, pulse 82. Patient alert and stable net uf 0, to keep patient even.blood pressure 94/0 pulse 82, on  room air. Patient awake and alert

## 2025-07-14 NOTE — PROGRESS NOTES
[facility] - Cardiology Stepdown  Heart Transplant  Progress Note    Patient Name: Radha Abbott  MRN: [id number]  Admission Date: 7/11/2025  Hospital Length of Stay: 3 days  Attending Physician: Brayan Ocampo MD  Primary Care Provider: Vasu Kong MD  Principal Problem:Diarrhea of presumed infectious origin    Subjective:   Interval History: HM 3 with recent hx of C-diff admitted with persistent diarrhea and LFA.  He has not had LFA since admission. Hydralazine increased to 75mg TID with good response.  Appreciate GI and ID consultation.  C-diff negative and Vanc stopped.  Supportive treatment with  Imodium, Lomotil and probiotic.  He will get HD at noon today then we will plan for discharge for supportive care of diarrhea.       Continuous Infusions:  Scheduled Meds:   amiodarone  400 mg Oral Daily    hydrALAZINE  10 mg Intravenous Once    hydrALAZINE  75 mg Oral Q8H    Lactobacillus rhamnosus GG  1 capsule Oral Daily    mupirocin   Nasal BID    potassium, sodium phosphates  1 packet Oral QID (AC & HS)    venlafaxine  37.5 mg Oral Daily    warfarin  0.5 mg Oral Daily     PRN Meds:  Current Facility-Administered Medications:     dextrose 50%, 12.5 g, Intravenous, PRN    dextrose 50%, 25 g, Intravenous, PRN    diphenoxylate-atropine 2.5-0.025 mg, 1 tablet, Oral, QID PRN    glucagon (human recombinant), 1 mg, Intramuscular, PRN    glucose, 16 g, Oral, PRN    glucose, 24 g, Oral, PRN    insulin aspart U-100, 0-5 Units, Subcutaneous, QID (AC + HS) PRN    Review of patient's allergies indicates:  No Known Allergies  Objective:     Vital Signs (Most Recent):  Temp: 99 °F (37.2 °C) (07/14/25 0758)  Pulse: 75 (07/14/25 0758)  Resp: 18 (07/14/25 0758)  BP: 107/81 (07/14/25 0800)  SpO2: 99 % (07/14/25 0758) Vital Signs (24h Range):  Temp:  [98 °F (36.7 °C)-99.4 °F (37.4 °C)] 99 °F (37.2 °C)  Pulse:  [] 75  Resp:  [16-19] 18  SpO2:  [95 %-99 %] 99 %  BP: ()/(0-81) 107/81     Patient Vitals for the past 72  hrs (Last 3 readings):   Weight   07/12/25 1231 75.8 kg (167 lb 1.7 oz)   07/11/25 1623 75.8 kg (167 lb 1.7 oz)   07/11/25 1506 75.8 kg (167 lb)     Body mass index is 22.05 kg/m².    No intake or output data in the 24 hours ending 07/14/25 0942      Hemodynamic Parameters:       Telemetry: reviewed     Physical Exam  Vitals and nursing note reviewed.   Constitutional:       Appearance: Normal appearance.   HENT:      Head: Normocephalic.      Nose: Nose normal.      Mouth/Throat:      Mouth: Mucous membranes are moist.   Eyes:      Pupils: Pupils are equal, round, and reactive to light.   Neck:      Comments: No JVP elevation   Cardiovascular:      Comments: Smooth VAD hum  Pulmonary:      Effort: Pulmonary effort is normal.      Breath sounds: Normal breath sounds.   Abdominal:      General: Bowel sounds are normal. There is no distension.      Palpations: Abdomen is soft.   Musculoskeletal:         General: Normal range of motion.      Right lower leg: No edema.      Left lower leg: No edema.   Skin:     General: Skin is warm.      Capillary Refill: Capillary refill takes 2 to 3 seconds.   Neurological:      General: No focal deficit present.      Mental Status: He is alert and oriented to person, place, and time.   Psychiatric:         Mood and Affect: Mood normal.         Behavior: Behavior normal.            Significant Labs:  CBC:  Recent Labs   Lab 07/12/25  0505 07/13/25  0600 07/14/25  0218   WBC 3.95 4.55 3.97   RBC 3.60* 3.92* 3.78*   HGB 10.3* 11.0* 10.7*   HCT 32.2* 35.3* 34.0*   * 134* 128*   MCV 89 90 90   MCH 28.6 28.1 28.3   MCHC 32.0 31.2* 31.5*     BNP:  Recent Labs   Lab 07/11/25  1634   *     CMP:  Recent Labs   Lab 07/11/25  1812 07/12/25  0505 07/12/25  0913 07/13/25  0600 07/14/25  0218   GLU 99   < > 75 69* 70   CALCIUM 7.5*   < > 7.8* 7.5* 7.3*   ALBUMIN 2.3*  --   --   --  2.0*   PROT 6.2  --   --   --  5.3*      < > 138 137 135*   K 3.5   < > 2.8* 3.5 3.2*   CO2 23    < > 24 23 23      < > 105 107 105   BUN 12   < > 16 21 21   CREATININE 3.3*   < > 4.1* 4.9* 5.3*   ALKPHOS 145  --   --   --  124   *  --   --   --  102*   *  --   --   --  117*   BILITOT 0.7  --   --   --  0.6    < > = values in this interval not displayed.      Coagulation:   Recent Labs   Lab 07/12/25  0505 07/13/25  0600 07/14/25  0218   INR 3.4* 2.7* 2.9*   APTT 58.4* 53.9* 55.0*     LDH:  Recent Labs   Lab 07/11/25  1812 07/12/25  0505 07/13/25  0600 07/14/25  0218   * 351* 382* 319*     Microbiology:  Microbiology Results (last 7 days)       Procedure Component Value Units Date/Time    Blood culture [[id number]]  (Normal) Collected: 07/11/25 1953    Order Status: Completed Specimen: Blood from Peripheral, Upper Arm, Right Updated: 07/13/25 2002     Blood Culture No Growth After 6 Hours    Blood culture [[id number]]  (Normal) Collected: 07/11/25 1953    Order Status: Completed Specimen: Blood from Peripheral, Antecubital, Right Updated: 07/13/25 2002     Blood Culture No Growth After 6 Hours    Clostridium difficile EIA [[id number]]  (Normal) Collected: 07/12/25 0246    Order Status: Completed Specimen: Stool Updated: 07/13/25 0542     C. DIFFICILE GDH AG Negative     Clostridioides difficile Toxin A/B Negative    Influenza A & B by Molecular [[id number]]  (Normal) Collected: 07/11/25 1634    Order Status: Completed Specimen: Nasal Swab Updated: 07/11/25 1841     INFLUENZA A MOLECULAR Negative     INFLUENZA B MOLECULAR  Negative    Culture, Respiratory with Gram Stain [[id number]]     Order Status: Canceled Specimen: Respiratory             I have reviewed all pertinent labs within the past 24 hours.    Estimated Creatinine Clearance: 15.3 mL/min (A) (based on SCr of 5.3 mg/dL (H)).    Diagnostic Results:  I have reviewed all pertinent imaging results/findings within the past 24 hours.  Assessment and Plan:     No notes on file    * Diarrhea of presumed infectious origin  -Repeat  C-diff studies negative  -GI and ID consulted and recommended supportive  -Imodium, Lomotil and  probiotic ordered.    -IV fluid bolus given on admit.     LVAD (left ventricular assist device) present  -HeartMate 3 Implanted  12/1/2023  as DT  -Hold Coumadin, Goal INR 2.0-3.0   therapeutic today. Home dose is 1mg Qdaily   -Antiplatelets Not on ASA  -LDH is stable overall today. Will continue to monitor daily.  -Speed set at 5000, LSL 4600 rpm  -Interrogation notable for LFA likely in the setting of HTN.  Will monitor with addition antihypertensives   -Not listed for OHTx    Procedure: Device Interrogation Including analysis of device parameters  Current Settings: Ventricular Assist Device  Review of device function is stable      7/14/2025     8:00 AM 7/14/2025     6:32 AM 7/13/2025    11:31 PM 7/13/2025     7:36 PM 7/13/2025     3:04 PM 7/13/2025    11:53 AM 7/13/2025     7:45 AM   TXP LVAD INTERROGATIONS   Type HeartMate3 HeartMate3 HeartMate3 HeartMate3 HeartMate3 HeartMate3 HeartMate3   Flow 3.1 3.3 3.9 3.8 3.2 3.2 3.3   Speed 5000 5000 5000 5000 5000 5000 5000   PI 8.5 7.4 9.1 5 6.9 8.4 7.3   Power ([place]) 3.5 3.4 3.5 3.4 3.5 3.5 3.5   LSL 4600 4600 4600 4600 4600 4600 4600   Pulsatility Pulse  Intermittent pulse No Pulse Intermittent pulse Pulse Pulse         Chronic combined systolic and diastolic CHF, NYHA class 4  -ICM s/p  3  -Last 2D Echo 4/3/25: LVEF 10-15%, LVEDD 5cm with speed at 5000  -Euvolemic on examination today  -Current diuretic regimen: N/A on HD MWF  -GDMT with N/A  -2g Na dietary restriction, 2000 mL fluid restriction, strict I/Os      PAF (paroxysmal atrial fibrillation)  -continue Amiodarone  -anticoagulated with coumadin     CAD (coronary artery disease)  -S/p 3vCABG in 2009  -Lipitor held  -Not on ASA post VAD    Anemia of chronic disease  -Due to underlying CKD  -Daily CBC    Type 2 diabetes mellitus without complication, without long-term current use of insulin  -Insulin sliding scale    -Hypoglycemia protocol    Clostridium difficile infection  -Check C diff negative   -Appreciate Infectious disease consult     ESRD (end stage renal disease)  -On HD MWF  -Nephrology consulted         DENIZ Faye  Heart Transplant  [facility] - Cardiology Stepdown

## 2025-07-14 NOTE — PROGRESS NOTES
Discharge    Pt presents in room with spouse. Pt and spouse present aaox4 with open affects. Pt and spouse voice agreement with plan to discharge to home today. Pt will receive bedside HD prior to discharge and will resume schedule Weds at [facility] in . Pt and spouse report pt goes to OP PT/OT when he is able. Pt's spouse will transport pt home.     Pt and spouse with questions about pt getting a collapsible electric wheelchair. Advised pt and spouse they could speak with Mr. Wheelchair to get an idea of insurance coverage and out of pocket costs. Also advised that pt's PCP would have to write the order. Pt and spouse voice understanding.    Pt reports coping well and denies further needs, questions, concerns at this time and none indicated. Providing psychosocial and counseling support, education, resources, assistance and discharge planning as indicated. SW remains available.

## 2025-07-14 NOTE — ASSESSMENT & PLAN NOTE
ESRD on iHD MWF  [facility] ([facility])  3 hours  EDW:  82.1 kg  [facility] TDC  Last HD prior to presentation 7/11/25     -iHD per MWF at bedside today   -1.5L fluid restrictions  -continue strict I/O's  -Renal diet as tolerated

## 2025-07-14 NOTE — PROGRESS NOTES
Pt and wife AAAO.  Nephrology will not remove any fluid today with HD.  Talked with pt and wife about getting him stronger.  Wife said he is not eating at all, doesn't have an appetite.  She said someone told her today milkshakes and protein shakes are not good due to his belly.  They are going home today.  They will work together on getting him stronger at home.  No questions for me at this time.

## 2025-07-15 ENCOUNTER — ANTI-COAG VISIT (OUTPATIENT)
Dept: CARDIOLOGY | Facility: CLINIC | Age: 65
End: 2025-07-15
Payer: MEDICAID

## 2025-07-15 DIAGNOSIS — Z95.811 LVAD (LEFT VENTRICULAR ASSIST DEVICE) PRESENT: Primary | ICD-10-CM

## 2025-07-15 NOTE — PROGRESS NOTES
Admitted 7/11-7/14. Hospital course: Radha Abbott is a 65 y.o. male s/p HM3 admitted for diarrhea. CDIF was negative and plan at discharge was lomotil and probiotics. Lipitor held for LFT elevation.

## 2025-07-16 ENCOUNTER — PATIENT MESSAGE (OUTPATIENT)
Dept: TRANSPLANT | Facility: CLINIC | Age: 65
End: 2025-07-16
Payer: MEDICAID

## 2025-07-17 ENCOUNTER — ANTI-COAG VISIT (OUTPATIENT)
Dept: CARDIOLOGY | Facility: CLINIC | Age: 65
End: 2025-07-17
Payer: MEDICAID

## 2025-07-17 ENCOUNTER — LAB VISIT (OUTPATIENT)
Dept: LAB | Facility: HOSPITAL | Age: 65
End: 2025-07-17
Attending: INTERNAL MEDICINE
Payer: MEDICAID

## 2025-07-17 DIAGNOSIS — Z95.811 PRESENCE OF VENTRICULAR ASSIST DEVICE: ICD-10-CM

## 2025-07-17 DIAGNOSIS — Z95.811 LVAD (LEFT VENTRICULAR ASSIST DEVICE) PRESENT: Primary | ICD-10-CM

## 2025-07-17 LAB
INR PPP: 2.3 (ref 0.8–1.2)
PROTHROMBIN TIME: 25 SECONDS (ref 9–12.5)

## 2025-07-17 PROCEDURE — 93793 ANTICOAG MGMT PT WARFARIN: CPT | Mod: ,,,

## 2025-07-17 PROCEDURE — 85610 PROTHROMBIN TIME: CPT

## 2025-07-17 PROCEDURE — 36415 COLL VENOUS BLD VENIPUNCTURE: CPT

## 2025-07-17 NOTE — PROGRESS NOTES
[facility]    2025 9:36 AM    Assessment/Plan:    Patient presents today with therapeutic INR.    Assessment of patient findings and chart review: no significant findings     Recommendation for patient's warfarin regimen: Continue current maintenance dose    Recommend repeat INR Monday  _________________________________________________________________    [facility]65 [facility] by [facility].    Anticoagulation Summary  As of 2025      INR goal:  2.0-3.0   TTR:  67.3% (1.2 y)   INR used for dosin.3 (2025)   Warfarin maintenance plan:  0.5 mg (1 mg x 0.5) every Mon, Wed, Fri; 1 mg (1 mg x 1) all other days   Weekly warfarin total:  5.5 mg   Plan last modified:  Jesenia Jensen, PharmD (7/15/2025)   Next INR check:  2025   Target end date:  --    Indications    LVAD (left ventricular assist device) present [Z95.811]                 Anticoagulation Episode Summary       INR check location:  --    Preferred lab:  --    Send INR reminders to:  [facility] COUMADIN LVAD    Comments:  [facility]          Anticoagulation Care Providers       Provider Role Specialty Phone number    Sajan Hurley MD [facility] Cardiology [phone]

## 2025-07-18 LAB
BACTERIA BLD CULT: NORMAL
BACTERIA BLD CULT: NORMAL

## 2025-07-21 ENCOUNTER — TELEPHONE (OUTPATIENT)
Dept: TRANSPLANT | Facility: CLINIC | Age: 65
End: 2025-07-21
Payer: MEDICAID

## 2025-07-21 ENCOUNTER — HOSPITAL ENCOUNTER (EMERGENCY)
Facility: HOSPITAL | Age: 65
Discharge: OTHER INSTITUTION | End: 2025-07-21
Payer: MEDICAID

## 2025-07-21 ENCOUNTER — HOSPITAL ENCOUNTER (OUTPATIENT)
Facility: HOSPITAL | Age: 65
Discharge: HOSPICE | End: 2025-07-25
Attending: INTERNAL MEDICINE | Admitting: INTERNAL MEDICINE
Payer: MEDICAID

## 2025-07-21 VITALS
OXYGEN SATURATION: 99 % | SYSTOLIC BLOOD PRESSURE: 114 MMHG | TEMPERATURE: 98 F | HEART RATE: 87 BPM | RESPIRATION RATE: 16 BRPM | DIASTOLIC BLOOD PRESSURE: 80 MMHG

## 2025-07-21 DIAGNOSIS — I50.20 HFREF (HEART FAILURE WITH REDUCED EJECTION FRACTION): ICD-10-CM

## 2025-07-21 DIAGNOSIS — I50.42 CHRONIC COMBINED SYSTOLIC AND DIASTOLIC CHF, NYHA CLASS 4: Chronic | ICD-10-CM

## 2025-07-21 DIAGNOSIS — E44.0 PROTEIN-CALORIE MALNUTRITION, MODERATE: ICD-10-CM

## 2025-07-21 DIAGNOSIS — R53.83 FATIGUE, UNSPECIFIED TYPE: ICD-10-CM

## 2025-07-21 DIAGNOSIS — Z51.5 PALLIATIVE CARE ENCOUNTER: ICD-10-CM

## 2025-07-21 DIAGNOSIS — R63.0 POOR APPETITE: ICD-10-CM

## 2025-07-21 DIAGNOSIS — R06.02 SHORTNESS OF BREATH: ICD-10-CM

## 2025-07-21 DIAGNOSIS — R62.7 FAILURE TO THRIVE IN ADULT: ICD-10-CM

## 2025-07-21 DIAGNOSIS — R53.1 WEAKNESS: Primary | ICD-10-CM

## 2025-07-21 DIAGNOSIS — R62.7 FAILURE TO THRIVE IN ADULT: Primary | ICD-10-CM

## 2025-07-21 DIAGNOSIS — T82.9XXA LEFT VENTRICULAR ASSIST DEVICE (LVAD) COMPLICATION, INITIAL ENCOUNTER: ICD-10-CM

## 2025-07-21 DIAGNOSIS — Z71.89 ADVANCED CARE PLANNING/COUNSELING DISCUSSION: ICD-10-CM

## 2025-07-21 DIAGNOSIS — Z71.89 GOALS OF CARE, COUNSELING/DISCUSSION: ICD-10-CM

## 2025-07-21 DIAGNOSIS — N18.6 ESRD (END STAGE RENAL DISEASE): ICD-10-CM

## 2025-07-21 DIAGNOSIS — R19.7 DIARRHEA OF PRESUMED INFECTIOUS ORIGIN: ICD-10-CM

## 2025-07-21 DIAGNOSIS — Z95.811 LVAD (LEFT VENTRICULAR ASSIST DEVICE) PRESENT: ICD-10-CM

## 2025-07-21 LAB
ABSOLUTE EOSINOPHIL (OHS): 0 K/UL
ABSOLUTE MONOCYTE (OHS): 0.22 K/UL (ref 0.3–1)
ABSOLUTE NEUTROPHIL COUNT (OHS): 3.48 K/UL (ref 1.8–7.7)
ALBUMIN SERPL BCP-MCNC: 2.2 G/DL (ref 3.5–5.2)
ALP SERPL-CCNC: 119 UNIT/L (ref 40–150)
ALT SERPL W/O P-5'-P-CCNC: 80 UNIT/L (ref 10–44)
ANION GAP (OHS): 7 MMOL/L (ref 8–16)
APTT PPP: 43 SECONDS (ref 21–32)
AST SERPL-CCNC: 113 UNIT/L (ref 11–45)
BASOPHILS # BLD AUTO: 0.03 K/UL
BASOPHILS NFR BLD AUTO: 0.7 %
BILIRUB SERPL-MCNC: 0.6 MG/DL (ref 0.1–1)
BUN SERPL-MCNC: 12 MG/DL (ref 8–23)
CALCIUM SERPL-MCNC: 7.7 MG/DL (ref 8.7–10.5)
CHLORIDE SERPL-SCNC: 102 MMOL/L (ref 95–110)
CO2 SERPL-SCNC: 26 MMOL/L (ref 23–29)
CREAT SERPL-MCNC: 3.2 MG/DL (ref 0.5–1.4)
ERYTHROCYTE [DISTWIDTH] IN BLOOD BY AUTOMATED COUNT: 19 % (ref 11.5–14.5)
GFR SERPLBLD CREATININE-BSD FMLA CKD-EPI: 21 ML/MIN/1.73/M2
GLUCOSE SERPL-MCNC: 81 MG/DL (ref 70–110)
HCT VFR BLD AUTO: 33.3 % (ref 40–54)
HGB BLD-MCNC: 10.6 GM/DL (ref 14–18)
IMM GRANULOCYTES # BLD AUTO: 0.01 K/UL (ref 0–0.04)
IMM GRANULOCYTES NFR BLD AUTO: 0.2 % (ref 0–0.5)
INR PPP: 2 (ref 0.8–1.2)
LACTATE SERPL-SCNC: 0.8 MMOL/L (ref 0.5–2.2)
LYMPHOCYTES # BLD AUTO: 0.47 K/UL (ref 1–4.8)
MCH RBC QN AUTO: 28.9 PG (ref 27–31)
MCHC RBC AUTO-ENTMCNC: 31.8 G/DL (ref 32–36)
MCV RBC AUTO: 91 FL (ref 82–98)
NUCLEATED RBC (/100WBC) (OHS): 0 /100 WBC
PLATELET # BLD AUTO: 128 K/UL (ref 150–450)
PMV BLD AUTO: 10.4 FL (ref 9.2–12.9)
POTASSIUM SERPL-SCNC: 3.2 MMOL/L (ref 3.5–5.1)
PROT SERPL-MCNC: 6.1 GM/DL (ref 6–8.4)
PROTHROMBIN TIME: 21.9 SECONDS (ref 9–12.5)
RBC # BLD AUTO: 3.67 M/UL (ref 4.6–6.2)
RELATIVE EOSINOPHIL (OHS): 0 %
RELATIVE LYMPHOCYTE (OHS): 11.2 % (ref 18–48)
RELATIVE MONOCYTE (OHS): 5.2 % (ref 4–15)
RELATIVE NEUTROPHIL (OHS): 82.7 % (ref 38–73)
SODIUM SERPL-SCNC: 135 MMOL/L (ref 136–145)
TROPONIN I SERPL DL<=0.01 NG/ML-MCNC: 0.11 NG/ML
WBC # BLD AUTO: 4.21 K/UL (ref 3.9–12.7)

## 2025-07-21 PROCEDURE — 99285 EMERGENCY DEPT VISIT HI MDM: CPT | Mod: 25

## 2025-07-21 PROCEDURE — 85025 COMPLETE CBC W/AUTO DIFF WBC: CPT

## 2025-07-21 PROCEDURE — 93010 ELECTROCARDIOGRAM REPORT: CPT | Mod: ,,, | Performed by: INTERNAL MEDICINE

## 2025-07-21 PROCEDURE — 83605 ASSAY OF LACTIC ACID: CPT

## 2025-07-21 PROCEDURE — 93005 ELECTROCARDIOGRAM TRACING: CPT

## 2025-07-21 PROCEDURE — 27000248 HC VAD-ADDITIONAL DAY

## 2025-07-21 PROCEDURE — 85730 THROMBOPLASTIN TIME PARTIAL: CPT

## 2025-07-21 PROCEDURE — 84484 ASSAY OF TROPONIN QUANT: CPT

## 2025-07-21 PROCEDURE — 82040 ASSAY OF SERUM ALBUMIN: CPT

## 2025-07-21 PROCEDURE — 25000003 PHARM REV CODE 250

## 2025-07-21 PROCEDURE — 85610 PROTHROMBIN TIME: CPT

## 2025-07-21 RX ADMIN — WARFARIN SODIUM 0.5 MG: 1 TABLET ORAL at 07:07

## 2025-07-21 NOTE — TELEPHONE ENCOUNTER
Arlyn called to report Radha isn't doing well.  She said he still isn't eating, weaker than he was, Arlyn is providing total care to him.  I asked if this is worse than it was when he was here last week.  She said yes, much worse.  Arlyn said he is dragging his right leg and his right arm. I asked if she thinks he had a stoke and she said maybe.  Symptoms started Friday. He doesn't make sense when he talks all the time. I advised her to call 911 and take him to the ER. She said she is at home with her sister so she will call her now. On call MD and VC notified.

## 2025-07-21 NOTE — ED PROVIDER NOTES
SCRIBE #1 NOTE: I, Davon Perales, am scribing for, and in the presence of, Guadalupe Rico DO. I have scribed the entire note.       History     Chief Complaint   Patient presents with    Weakness     Bilateral leg weakness that started today. Denies falls. Uses a wheelchair to get around. Hx of LVAD     Review of patient's allergies indicates:  No Known Allergies      History of Present Illness     HPI    7/21/2025, 3:15 PM  History obtained from the patient, medical records, and EMS      History of Present Illness: Radha Abbott is a 65 y.o. male patient with a PMHx of MI, CAD, heart failure, ICD, CHF, DM Type 2, CKD Stage 4, PAF, LVAD who presents to the Emergency Department for evaluation of bilateral leg weakness which began this morning. Per EMS, family reports pt has not been eating or drinking. Pt denies any recent falls. Pt states he uses a wheelchair to get around. Symptoms are constant and moderate in severity. No mitigating or exacerbating factors reported. Patient denies any nausea, vomiting, abdominal pain, dizziness, fever, chest pain, or leg swelling. No prior Tx specified.  No further complaints or concerns at this time.       Arrival mode: Ambulance Service    PCP: Vasu Kong MD        Past Medical History:  Past Medical History:   Diagnosis Date    Aspiration pneumonia of both lungs 06/17/2024    CAD (coronary artery disease)     CHF (congestive heart failure)     Delirium 06/16/2024    Diabetes mellitus     HFrEF (heart failure with reduced ejection fraction)     Hypoglycemia 06/12/2024    ICD (implantable cardioverter-defibrillator) in place     LVAD (left ventricular assist device) present 12/01/2023    MI, old     PAF (paroxysmal atrial fibrillation) 10/11/2023    Stage 4 chronic kidney disease 02/15/2024    Type 2 diabetes mellitus without complication, without long-term current use of insulin 10/07/2023       Past Surgical History:  Past Surgical History:   Procedure Laterality Date     ANGIOPLASTY-VENOUS ARTERY Right 12/1/2023    Procedure: ANGIOPLASTY-VENOUS ARTERY, RIGHT FEMORAL;  Surgeon: Yuri Washington MD;  Location: [facility] OR [facility] FLR;  Service: Cardiovascular;  Laterality: Right;    AORTIC VALVULOPLASTY N/A 12/1/2023    Procedure: REPAIR, AORTIC VALVE;  Surgeon: Yuri Washington MD;  Location: [facility] OR [facility] FLR;  Service: Cardiovascular;  Laterality: N/A;    CARDIAC SURGERY      CLOSURE N/A 12/1/2023    Procedure: CLOSURE, TEMPORARY;  Surgeon: Yuri Washington MD;  Location: [facility] OR [facility] FLR;  Service: Cardiovascular;  Laterality: N/A;    DRAINAGE OF PLEURAL EFFUSION  12/4/2023    Procedure: DRAINAGE, PLEURAL EFFUSION;  Surgeon: Yuri Washington MD;  [facility];  Service: Cardiovascular;;    INSERTION OF GRAFT TO PERICARDIUM  12/4/2023    Procedure: INSERTION, GRAFT, PERICARDIUM;  Surgeon: Yuri Washington MD;  [facility];  Service: Cardiovascular;;    INSERTION OF INTRA-AORTIC BALLOON ASSIST DEVICE Right 11/21/2023    Procedure: INSERTION, INTRA-AORTIC BALLOON PUMP;  Surgeon: Finn Cohn MD;  Location: [facility] CATH LAB;  Service: Cardiology;  Laterality: Right;    LEFT VENTRICULAR ASSIST DEVICE Left 12/1/2023    Procedure: INSERTION-LEFT VENTRICULAR ASSIST DEVICE;  Surgeon: Yuri Washington MD;  [facility];  Service: Cardiovascular;  Laterality: Left;  REDO STERNOTOMY - REDO SAW NEEDED FOR CASE    LYSIS OF ADHESIONS  12/1/2023    Procedure: LYSIS, ADHESIONS;  Surgeon: Yuri Washington MD;  Location: [facility] OR [facility] FLR;  Service: Cardiovascular;;    PLACEMENT OF SWAN ROLANDO CATHETER WITH IMAGING GUIDANCE  11/20/2023    Procedure: INSERTION, CATHETER, SWAN-ROLANDO, WITH IMAGING GUIDANCE;  Surgeon: Sajan Hurley MD;  Location: [facility] CATH LAB;  Service: Cardiology;;    REMOVAL OF TUNNELED CENTRAL VENOUS CATHETER (CVC) N/A 3/1/2024    Procedure: REMOVAL, CATHETER, CENTRAL VENOUS, TUNNELED;  Surgeon: Seble Aguilar MD;  Location: [facility] CATH LAB;  Service:  Interventional Nephrology;  Laterality: N/A;    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 12/1/2023    Procedure: REPAIR, ANEURYSM, ARTERY, FEMORAL;  Surgeon: Yuri Washington MD;  Location: [address];  Service: Cardiovascular;  Laterality: Right;  Right Femoral Artery Repair    RIGHT HEART CATHETERIZATION Right 10/10/2023    Procedure: INSERTION, CATHETER, RIGHT HEART;  Surgeon: Bin Gandhi MD;  Location: [facility] CATH LAB;  Service: Cardiology;  Laterality: Right;    RIGHT HEART CATHETERIZATION Right 10/13/2023    Procedure: INSERTION, CATHETER, RIGHT HEART;  Surgeon: Walter Mcintyre MD;  Location: [facility] CATH LAB;  Service: Cardiology;  Laterality: Right;    RIGHT HEART CATHETERIZATION  11/13/2023    RIGHT HEART CATHETERIZATION Right 11/13/2023    Procedure: INSERTION, CATHETER, RIGHT HEART;  Surgeon: Juventino Bermudez Jr., MD;  Location: [facility] CATH LAB;  Service: Cardiology;  Laterality: Right;    RIGHT HEART CATHETERIZATION Right 11/20/2023    Procedure: INSERTION, CATHETER, RIGHT HEART;  Surgeon: Sajan Hurley MD;  Location: [facility] CATH LAB;  Service: Cardiology;  Laterality: Right;    RIGHT HEART CATHETERIZATION Right 1/22/2024    Procedure: INSERTION, CATHETER, RIGHT HEART;  Surgeon: Brayan Ocampo MD;  Location: [facility] CATH LAB;  Service: Cardiology;  Laterality: Right;    STERNAL WOUND CLOSURE N/A 12/4/2023    Procedure: CLOSURE, WOUND, STERNUM;  Surgeon: Yuri Washington MD;  Location: [address];  Service: Cardiovascular;  Laterality: N/A;    STERNOTOMY N/A 12/1/2023    Procedure: STERNOTOMY, REDO;  Surgeon: Yuri Washington MD;  Location: [address];  Service: Cardiovascular;  Laterality: N/A;    VALVULOPLASTY, MITRAL VALVE N/A 12/1/2023    Procedure: VALVULOPLASTY, MITRAL VALVE;  Surgeon: Yuri Washington MD;  [address];  Service: Cardiovascular;  Laterality: N/A;         Family History:  No family history on file.    Social History:  Social History     Tobacco Use     Smoking status: Former     Current packs/day: 0.50     Types: Cigarettes    Smokeless tobacco: Not on file   Vaping Use    Vaping status: Never Used   Substance and Sexual Activity    Alcohol use: Yes     Comment: rarely    Drug use: No    Sexual activity: Not Currently     Partners: Female        Review of Systems     Review of Systems   Constitutional:  Positive for appetite change. Negative for fever.   Cardiovascular:  Negative for chest pain and leg swelling.   Gastrointestinal:  Negative for abdominal pain, nausea and vomiting.   Neurological:  Positive for weakness (BLE). Negative for dizziness.      Physical Exam     Initial Vitals   BP Pulse Resp Temp SpO2   07/21/25 1446 07/21/25 1548 07/21/25 1446 07/21/25 1446 07/21/25 1446   94/75 89 18 98.5 °F (36.9 °C) 99 %      MAP       --                 Physical Exam  Nursing Notes and Vital Signs Reviewed.  Constitutional: Patient is in no acute distress. Well-developed and well-nourished.  Head: Atraumatic. Normocephalic.  Eyes: PERRL. EOM intact. Conjunctivae are not pale. No scleral icterus.  ENT: Mucous membranes are moist. Oropharynx is clear and symmetric.    Neck: Supple. Full ROM. No lymphadenopathy.  Cardiovascular: LVAD hum wires are clean and intact.    Pulmonary/Chest: No respiratory distress. Clear to auscultation bilaterally. No wheezing or rales. Vas cath to right upper chest wall  Abdominal: Soft and non-distended.  There is no tenderness.  No rebound, guarding, or rigidity. Good bowel sounds.  Musculoskeletal: Moves all extremities. No obvious deformities. No BLE edema. No calf tenderness.  Skin: Warm and dry.  Neurological:  Alert, awake, and appropriate.  Normal speech.  No acute focal neurological deficits are appreciated. Strength is 5/5 and equal in bilateral upper and lower extremities. There is no pronator drift of outstretched arms. Light touch sense is intact.      ED Course   Critical Care    Date/Time: 7/21/2025 8:16 PM    Performed  by: Guadalupe Rico DO  Authorized by: Guadalupe Rico DO  Direct patient critical care time: 15 minutes  Additional history critical care time: 10 minutes  Ordering / reviewing critical care time: 10 minutes  Consulting other physicians critical care time: 8 minutes  Consult with family critical care time: 5 minutes  Total critical care time (exclusive of procedural time) : 48 minutes  Critical care time was exclusive of separately billable procedures and treating other patients and teaching time.  Critical care was necessary to treat or prevent imminent or life-threatening deterioration of the following conditions: cardiac failure.  Critical care was time spent personally by me on the following activities: development of treatment plan with patient or surrogate, discussions with consultants, interpretation of cardiac output measurements, evaluation of patient's response to treatment, examination of patient, obtaining history from patient or surrogate, ordering and performing treatments and interventions, ordering and review of laboratory studies, ordering and review of radiographic studies, pulse oximetry, re-evaluation of patient's condition and review of old charts.  Comments: Management of LVAD device, discussing with heart transplant team, ordering and interpreting lab and imaging, discussions with wife.         ED Vital Signs:  Vitals:    07/21/25 1446 07/21/25 1548 07/21/25 1600 07/21/25 1715   BP: 94/75   114/80   Pulse:  89 87 88   Resp: 18 18 15 16   Temp: 98.5 °F (36.9 °C)      TempSrc: Oral      SpO2: 99% 100% 100% 100%       Abnormal Lab Results:  Labs Reviewed   APTT - Abnormal       Result Value    PTT 43.0 (*)    COMPREHENSIVE METABOLIC PANEL - Abnormal    Sodium 135 (*)     Potassium 3.2 (*)     Chloride 102      CO2 26      Glucose 81      BUN 12      Creatinine 3.2 (*)     Calcium 7.7 (*)     Protein Total 6.1      Albumin 2.2 (*)     Bilirubin Total 0.6             (*)     ALT 80 (*)      Anion Gap 7 (*)     eGFR 21 (*)    PROTIME-INR - Abnormal    PT 21.9 (*)     INR 2.0 (*)    TROPONIN I - Abnormal    Troponin-I 0.106 (*)    CBC WITH DIFFERENTIAL - Abnormal    WBC 4.21      RBC 3.67 (*)     HGB 10.6 (*)     HCT 33.3 (*)     MCV 91      MCH 28.9      MCHC 31.8 (*)     RDW 19.0 (*)     Platelet Count 128 (*)     MPV 10.4      Nucleated RBC 0      Neut % 82.7 (*)     Lymph % 11.2 (*)     Mono % 5.2      Eos % 0.0      Basophil % 0.7      Imm Grans % 0.2      Neut # 3.48      Lymph # 0.47 (*)     Mono # 0.22 (*)     Eos # 0.00      Baso # 0.03      Imm Grans # 0.01      Narrative:     This is an appended report.  These results have been appended to a previously verified report.   LACTIC ACID, PLASMA - Normal    Lactic Acid Level 0.8      Narrative:     Falsely low lactic acid results can be found in samples containing >=13.0 mg/dL total bilirubin and/or >=3.5 mg/dL direct bilirubin.    CBC W/ AUTO DIFFERENTIAL    Narrative:     The following orders were created for panel order CBC auto differential.  Procedure                               Abnormality         Status                     ---------                               -----------         ------                     CBC with Differential[[id number]]       Abnormal            Final result                 Please view results for these tests on the individual orders.        All Lab Results:  Results for orders placed or performed during the hospital encounter of 07/21/25   EKG 12-lead    Collection Time: 07/21/25  3:23 PM   Result Value Ref Range    QRS Duration 180 ms    [facility] QTC Calculation 559 ms   APTT    Collection Time: 07/21/25  3:47 PM   Result Value Ref Range    PTT 43.0 (H) 21.0 - 32.0 seconds   Comprehensive metabolic panel    Collection Time: 07/21/25  3:47 PM   Result Value Ref Range    Sodium 135 (L) 136 - 145 mmol/L    Potassium 3.2 (L) 3.5 - 5.1 mmol/L    Chloride 102 95 - 110 mmol/L    CO2 26 23 - 29 mmol/L    Glucose 81 70 - 110  mg/dL    BUN 12 8 - 23 mg/dL    Creatinine 3.2 (H) 0.5 - 1.4 mg/dL    Calcium 7.7 (L) 8.7 - 10.5 mg/dL    Protein Total 6.1 6.0 - 8.4 gm/dL    Albumin 2.2 (L) 3.5 - 5.2 g/dL    Bilirubin Total 0.6 0.1 - 1.0 mg/dL     40 - 150 unit/L     (H) 11 - 45 unit/L    ALT 80 (H) 10 - 44 unit/L    Anion Gap 7 (L) 8 - 16 mmol/L    eGFR 21 (L) >60 mL/min/1.73/m2   Lactic acid, plasma    Collection Time: 07/21/25  3:47 PM   Result Value Ref Range    Lactic Acid Level 0.8 0.5 - 2.2 mmol/L   Protime-INR    Collection Time: 07/21/25  3:47 PM   Result Value Ref Range    PT 21.9 (H) 9.0 - 12.5 seconds    INR 2.0 (H) 0.8 - 1.2   Troponin I    Collection Time: 07/21/25  3:47 PM   Result Value Ref Range    Troponin-I 0.106 (H) <=0.026 ng/mL   CBC with Differential    Collection Time: 07/21/25  3:47 PM   Result Value Ref Range    WBC 4.21 3.90 - 12.70 K/uL    RBC 3.67 (L) 4.60 - 6.20 M/uL    HGB 10.6 (L) 14.0 - 18.0 gm/dL    HCT 33.3 (L) 40.0 - 54.0 %    MCV 91 82 - 98 fL    MCH 28.9 27.0 - 31.0 pg    MCHC 31.8 (L) 32.0 - 36.0 g/dL    RDW 19.0 (H) 11.5 - 14.5 %    Platelet Count 128 (L) 150 - 450 K/uL    MPV 10.4 9.2 - 12.9 fL    Nucleated RBC 0 <=0 /100 WBC    Neut % 82.7 (H) 38 - 73 %    Lymph % 11.2 (L) 18 - 48 %    Mono % 5.2 4 - 15 %    Eos % 0.0 <=8 %    Basophil % 0.7 <=1.9 %    Imm Grans % 0.2 0.0 - 0.5 %    Neut # 3.48 1.8 - 7.7 K/uL    Lymph # 0.47 (L) 1 - 4.8 K/uL    Mono # 0.22 (L) 0.3 - 1 K/uL    Eos # 0.00 <=0.5 K/uL    Baso # 0.03 <=0.2 K/uL    Imm Grans # 0.01 0.00 - 0.04 K/uL     *Note: Due to a large number of results and/or encounters for the requested time period, some results have not been displayed. A complete set of results can be found in Results Review.       Imaging Results:  Imaging Results              CT Head Without Contrast (Final result)  Result time 07/21/25 19:24:53      Final result by Pj Velarde MD (07/21/25 19:24:53)                   Impression:      1.    No acute intracranial  abnormalities      All CT scans at [this location] are performed using dose modulation techniques as appropriate to a performed exam including the following: automated exposure control; adjustment of the mA and/or kV according to patient size (this includes techniques or standardized protocols for targeted exams where dose is matched to indication / reason for exam; i.e. extremities or head); use of iterative reconstruction technique.      Finalized on: 7/21/2025 7:24 PM By:  Pj Velarde MD  [facility]# [id number]      2025-07-21 19:26:56.173     [facility]               Narrative:    EXAM: CT HEAD WITHOUT CONTRAST    CLINICAL HISTORY: Weakness    TECHNIQUE: Contiguous axial images were obtained from the skull base through the vertex without intravenous contrast.    COMPARISON: 06/27/2025    FINDINGS: Right parietal scalp soft tissue hematoma. No intracranial hemorrhage.  No mass effect or midline shift.  No extra axial fluid collections.  No areas of abnormal parenchymal attenuation.  The ventricles and sulci are normal in size and configuration.  There is no evidence of hydrocephalus.  The pineal region is unremarkable.  The posterior fossa structures are grossly unremarkable within the limits of CT scan.  The paranasal sinuses are clear.  No fractures are identified.  No concerning osseous lesions.  Remote fracture bilateral medial orbital walls.  Bilateral mastoid effusions similar to prior likely reflect chronic mastoiditis.                                         X-Ray Chest AP Portable (Final result)  Result time 07/21/25 17:10:01      Final result by Pj Velarde MD (07/21/25 17:10:01)                   Impression:     Mild basilar interstitial edema.    Finalized on: 7/21/2025 5:10 PM By:  Pj Velarde MD  [facility]# [id number]      2025-07-21 17:12:08.323     [facility]               Narrative:    EXAM:  XR CHEST AP PORTABLE    CLINICAL HISTORY: Generalized weakness    COMPARISON: 03/09/2025    FINDINGS: Right-sided  Vas-Cath tip overlies the SVC.  Left-sided chest generator with leads in similar configuration.  Ventricular assist device also noted. Bibasilar edema.  Mild atelectasis left lung base.  There is no evidence of pleural effusion, pneumothorax, or other acute pulmonary disease.       No acute osseous abnormality is evident.   Moderate scattered degenerative change and atherosclerotic disease.  Sternotomy wires are noted.                                           The EKG was ordered, reviewed, and independently interpreted by the ED provider.  Interpretation time: 15:23  Rate: 88 BPM  Rhythm: Atrial flutter  Interpretation: Nonspecific intraventricular block. Minimal voltage criteria for LVH, may be normal variant ([place] product). No STEMI.           The Emergency Provider reviewed the vital signs and test results, which are outlined above.     ED Discussion     6:43 PM: Consult with Dr. Mcintyre (Transplant Cardiology) at [facility] concerning pt. There are no transplant cardiology services, which the patient requires, offered at [facility] at this time. Dr. Mcintyre expresses understanding and will accept transfer  Accepting Facility: [facility]   Accepting Physician: Dr. Walter Mcintyre     6:43 PM: Re-evaluated pt. Informed patient/family/caretaker that there are no transplant cardiology services available at this time. I have discussed test results, shared treatment plan, and the need for transfer with patient and family at bedside. All historical, clinical, radiographic, and laboratory findings were reviewed with the patient/family/caretaker in detail. Patient will be transferred by [facility] services with care required en route. Patient/family/caretaker understands that there could be unforeseen vehicle accidents or loss of vital signs that could result in potential death or permanent disability. Pt and/or family/caretaker express understanding at this time and agree with all  information. All questions answered. Pt and/or family/caretaker have no further questions or concerns at this time. Patient is ready for transfer.        ED Course as of 07/21/25 2017 Mon Jul 21, 2025   1700 X-Ray Chest AP Portable  My independent interpretation:  Possible mild interstitial edema in the bases.  No infiltrate. LVAD in place.  [KF]   1800 My independent Laboratory interpretation:  Labs appear to be at baseline for the patient.  No new abnormalities. [KF]   1830 Discussed with Dr. Mcintyre on for heart transplant in [place].  Patient with adult failure to thrive. Will transfer to their facility for further management. He has accepted the patient. Updated patient and wife at the bedside regarding plan for transfer and all results thus far. Wife states she thinks he has been dragging his right leg for a few days. Will add on head CT prior to transfer.  [KF]      ED Course User Index  [KF] Guadalupe Rico, DO     Medical Decision Making  65-year-old male with history and physical as above.  Presents for evaluation of generalized weakness, decreased oral intake, and lightheadedness for the last several days.  No alarms on his LVAD.  No recent fever or chills.  Had dialysis today which he reports was uneventful.  Differentials considered include route limited to dehydration, LVAD malfunction, electrolyte or metabolic derangement, failure to thrive, other.  Plan for labs, imaging.  We will likely discuss with our transplant team and oral NSAIDs workup is complete. Please see ED course for documentation for all details regarding initial assessment, differential diagnoses, initial plan, lab interpretations, imaging interpretations, MDM, and final disposition.       Amount and/or Complexity of Data Reviewed  Independent Historian: EMS  Labs: ordered. Decision-making details documented in ED Course.  Radiology: ordered. Decision-making details documented in ED Course.  ECG/medicine tests: ordered and independent  interpretation performed. Decision-making details documented in ED Course.    Risk  Prescription drug management.  Decision regarding hospitalization.                ED Medication(s):  Medications   warfarin (COUMADIN) split tablet 0.5 mg (0.5 mg Oral Given 7/21/25 1935)       New Prescriptions    No medications on file               Scribe Attestation:   Scribe #1: I performed the above scribed service and the documentation accurately describes the services I performed. I attest to the accuracy of the note.     Attending:   Physician Attestation Statement for Scribe #1: I, Guadalupe Rico DO, personally performed the services described in this documentation, as scribed by Davon Perales, in my presence, and it is both accurate and complete.           Clinical Impression       ICD-10-CM ICD-9-CM   1. Failure to thrive in adult  R62.7 783.7   2. Shortness of breath  R06.02 786.05   3. Fatigue, unspecified type  R53.83 780.79   4. Poor appetite  R63.0 783.0       Disposition:   Disposition: Transferred  Condition: Fair       Guadalupe Rico DO  07/21/25 2017

## 2025-07-22 ENCOUNTER — PATIENT MESSAGE (OUTPATIENT)
Dept: PALLIATIVE MEDICINE | Facility: CLINIC | Age: 65
End: 2025-07-22
Payer: MEDICAID

## 2025-07-22 PROBLEM — I50.20 HFREF (HEART FAILURE WITH REDUCED EJECTION FRACTION): Status: ACTIVE | Noted: 2023-12-09

## 2025-07-22 PROBLEM — Z71.89 GOALS OF CARE, COUNSELING/DISCUSSION: Status: ACTIVE | Noted: 2024-05-30

## 2025-07-22 PROBLEM — R62.7 FAILURE TO THRIVE IN ADULT: Status: ACTIVE | Noted: 2025-07-22

## 2025-07-22 PROBLEM — Z71.89 ADVANCED CARE PLANNING/COUNSELING DISCUSSION: Status: ACTIVE | Noted: 2025-07-22

## 2025-07-22 LAB
ABSOLUTE EOSINOPHIL (OHS): 0.04 K/UL
ABSOLUTE MONOCYTE (OHS): 0.26 K/UL (ref 0.3–1)
ABSOLUTE NEUTROPHIL COUNT (OHS): 3.59 K/UL (ref 1.8–7.7)
ALBUMIN SERPL BCP-MCNC: 2 G/DL (ref 3.5–5.2)
ALP SERPL-CCNC: 114 UNIT/L (ref 40–150)
ALT SERPL W/O P-5'-P-CCNC: 69 UNIT/L (ref 10–44)
ANION GAP (OHS): 7 MMOL/L (ref 8–16)
AORTIC SIZE INDEX (SOV): 1.8 CM/M2
AORTIC SIZE INDEX: 1.7 CM/M2
APTT PPP: 41.7 SECONDS (ref 21–32)
ASCENDING AORTA: 3.4 CM
AST SERPL-CCNC: 98 UNIT/L (ref 11–45)
BASOPHILS # BLD AUTO: 0.03 K/UL
BASOPHILS NFR BLD AUTO: 0.7 %
BILIRUB SERPL-MCNC: 0.5 MG/DL (ref 0.1–1)
BSA FOR ECHO PROCEDURE: 1.98 M2
BUN SERPL-MCNC: 18 MG/DL (ref 8–23)
CALCIUM SERPL-MCNC: 7.3 MG/DL (ref 8.7–10.5)
CHLORIDE SERPL-SCNC: 105 MMOL/L (ref 95–110)
CO2 SERPL-SCNC: 23 MMOL/L (ref 23–29)
CREAT SERPL-MCNC: 3.7 MG/DL (ref 0.5–1.4)
CV ECHO LV RWT: 0.36 CM
DOP CALC LVOT AREA: 3.5 CM2
DOP CALC LVOT DIAMETER: 2.1 CM
E/E' RATIO: 22 M/S
ECHO EF ESTIMATED: 9 %
ECHO LV POSTERIOR WALL: 0.8 CM (ref 0.6–1.1)
ERYTHROCYTE [DISTWIDTH] IN BLOOD BY AUTOMATED COUNT: 19 % (ref 11.5–14.5)
FRACTIONAL SHORTENING: 4.4 % (ref 28–44)
GFR SERPLBLD CREATININE-BSD FMLA CKD-EPI: 18 ML/MIN/1.73/M2
GLUCOSE SERPL-MCNC: 72 MG/DL (ref 70–110)
HCT VFR BLD AUTO: 30.5 % (ref 40–54)
HGB BLD-MCNC: 9.7 GM/DL (ref 14–18)
IMM GRANULOCYTES # BLD AUTO: 0.01 K/UL (ref 0–0.04)
IMM GRANULOCYTES NFR BLD AUTO: 0.2 % (ref 0–0.5)
INR PPP: 2 (ref 0.8–1.2)
INTERVENTRICULAR SEPTUM: 1 CM (ref 0.6–1.1)
IVC DIAMETER: 1.29 CM
LA MAJOR: 5.8 CM
LA MINOR: 5.6 CM
LA WIDTH: 4 CM
LDH SERPL-CCNC: 368 U/L (ref 110–260)
LEFT ATRIUM SIZE: 5 CM
LEFT ATRIUM VOLUME INDEX MOD: 25 ML/M2
LEFT ATRIUM VOLUME INDEX: 48 ML/M2
LEFT ATRIUM VOLUME MOD: 49 ML
LEFT ATRIUM VOLUME: 97 CM3
LEFT INTERNAL DIMENSION IN SYSTOLE: 4.3 CM (ref 2.1–4)
LEFT VENTRICLE DIASTOLIC VOLUME INDEX: 46.5 ML/M2
LEFT VENTRICLE DIASTOLIC VOLUME: 93 ML
LEFT VENTRICLE MASS INDEX: 66.4 G/M2
LEFT VENTRICLE SYSTOLIC VOLUME INDEX: 42 ML/M2
LEFT VENTRICLE SYSTOLIC VOLUME: 84 ML
LEFT VENTRICULAR INTERNAL DIMENSION IN DIASTOLE: 4.5 CM (ref 3.5–6)
LEFT VENTRICULAR MASS: 132.8 G
LV LATERAL E/E' RATIO: 21.6 M/S
LV SEPTAL E/E' RATIO: 21.6 M/S
LVAD BASE RATE: 5000 RPM
LYMPHOCYTES # BLD AUTO: 0.52 K/UL (ref 1–4.8)
Lab: 1.8 CM/M
Lab: 1.9 CM/M
MAGNESIUM SERPL-MCNC: 1.7 MG/DL (ref 1.6–2.6)
MCH RBC QN AUTO: 28.9 PG (ref 27–31)
MCHC RBC AUTO-ENTMCNC: 31.8 G/DL (ref 32–36)
MCV RBC AUTO: 91 FL (ref 82–98)
MV PEAK E VEL: 1.08 M/S
NUCLEATED RBC (/100WBC) (OHS): 0 /100 WBC
OHS CV CPX PATIENT HEIGHT IN: 73
OHS CV RV/LV RATIO: 0.78 CM
OHS QRS DURATION: 180 MS
OHS QTC CALCULATION: 559 MS
PHOSPHATE SERPL-MCNC: 1.3 MG/DL (ref 2.7–4.5)
PISA TR MAX VEL: 2.8 M/S
PLATELET # BLD AUTO: 133 K/UL (ref 150–450)
PMV BLD AUTO: 10.5 FL (ref 9.2–12.9)
POCT GLUCOSE: 102 MG/DL (ref 70–110)
POCT GLUCOSE: 125 MG/DL (ref 70–110)
POCT GLUCOSE: 129 MG/DL (ref 70–110)
POCT GLUCOSE: 81 MG/DL (ref 70–110)
POTASSIUM SERPL-SCNC: 3.3 MMOL/L (ref 3.5–5.1)
PROT SERPL-MCNC: 5.3 GM/DL (ref 6–8.4)
PROTHROMBIN TIME: 20.6 SECONDS (ref 9–12.5)
RA MAJOR: 4.01 CM
RA PRESSURE ESTIMATED: 3 MMHG
RA WIDTH: 2.64 CM
RBC # BLD AUTO: 3.36 M/UL (ref 4.6–6.2)
RELATIVE EOSINOPHIL (OHS): 0.9 %
RELATIVE LYMPHOCYTE (OHS): 11.7 % (ref 18–48)
RELATIVE MONOCYTE (OHS): 5.8 % (ref 4–15)
RELATIVE NEUTROPHIL (OHS): 80.7 % (ref 38–73)
RIGHT ATRIAL AREA: 9.7 CM2
RIGHT VENTRICLE DIASTOLIC BASEL DIMENSION: 3.5 CM
RV TB RVSP: 6 MMHG
RV TISSUE DOPPLER FREE WALL SYSTOLIC VELOCITY 1 (APICAL 4 CHAMBER VIEW): 11.05 CM/S
SINUS: 3.5 CM
SODIUM SERPL-SCNC: 135 MMOL/L (ref 136–145)
STJ: 2.5 CM
TDI LATERAL: 0.05 M/S
TDI SEPTAL: 0.05 M/S
TDI: 0.05 M/S
TRICUSPID ANNULAR PLANE SYSTOLIC EXCURSION: 1.1 CM
TV PEAK GRADIENT: 32 MMHG
TV REST PULMONARY ARTERY PRESSURE: 34 MMHG
WBC # BLD AUTO: 4.45 K/UL (ref 3.9–12.7)
Z-SCORE OF LEFT VENTRICULAR DIMENSION IN END DIASTOLE: -2.59
Z-SCORE OF LEFT VENTRICULAR DIMENSION IN END SYSTOLE: 1.45

## 2025-07-22 PROCEDURE — 97165 OT EVAL LOW COMPLEX 30 MIN: CPT

## 2025-07-22 PROCEDURE — G0378 HOSPITAL OBSERVATION PER HR: HCPCS

## 2025-07-22 PROCEDURE — 25000003 PHARM REV CODE 250

## 2025-07-22 PROCEDURE — 99497 ADVNCD CARE PLAN 30 MIN: CPT | Mod: 25,,, | Performed by: CLINICAL NURSE SPECIALIST

## 2025-07-22 PROCEDURE — 36415 COLL VENOUS BLD VENIPUNCTURE: CPT | Performed by: STUDENT IN AN ORGANIZED HEALTH CARE EDUCATION/TRAINING PROGRAM

## 2025-07-22 PROCEDURE — 85025 COMPLETE CBC W/AUTO DIFF WBC: CPT | Performed by: STUDENT IN AN ORGANIZED HEALTH CARE EDUCATION/TRAINING PROGRAM

## 2025-07-22 PROCEDURE — 99215 OFFICE O/P EST HI 40 MIN: CPT | Mod: 25,,, | Performed by: CLINICAL NURSE SPECIALIST

## 2025-07-22 PROCEDURE — 87040 BLOOD CULTURE FOR BACTERIA: CPT | Mod: 91 | Performed by: PHYSICIAN ASSISTANT

## 2025-07-22 PROCEDURE — 99900035 HC TECH TIME PER 15 MIN (STAT)

## 2025-07-22 PROCEDURE — 63600175 PHARM REV CODE 636 W HCPCS

## 2025-07-22 PROCEDURE — 83735 ASSAY OF MAGNESIUM: CPT | Performed by: STUDENT IN AN ORGANIZED HEALTH CARE EDUCATION/TRAINING PROGRAM

## 2025-07-22 PROCEDURE — 25000003 PHARM REV CODE 250: Performed by: STUDENT IN AN ORGANIZED HEALTH CARE EDUCATION/TRAINING PROGRAM

## 2025-07-22 PROCEDURE — 85730 THROMBOPLASTIN TIME PARTIAL: CPT | Performed by: STUDENT IN AN ORGANIZED HEALTH CARE EDUCATION/TRAINING PROGRAM

## 2025-07-22 PROCEDURE — 85610 PROTHROMBIN TIME: CPT | Performed by: STUDENT IN AN ORGANIZED HEALTH CARE EDUCATION/TRAINING PROGRAM

## 2025-07-22 PROCEDURE — 27000248 HC VAD-ADDITIONAL DAY

## 2025-07-22 PROCEDURE — 99214 OFFICE O/P EST MOD 30 MIN: CPT | Mod: ,,, | Performed by: NURSE PRACTITIONER

## 2025-07-22 PROCEDURE — 94761 N-INVAS EAR/PLS OXIMETRY MLT: CPT

## 2025-07-22 PROCEDURE — 36415 COLL VENOUS BLD VENIPUNCTURE: CPT | Performed by: PHYSICIAN ASSISTANT

## 2025-07-22 PROCEDURE — 97530 THERAPEUTIC ACTIVITIES: CPT

## 2025-07-22 PROCEDURE — 80053 COMPREHEN METABOLIC PANEL: CPT | Performed by: STUDENT IN AN ORGANIZED HEALTH CARE EDUCATION/TRAINING PROGRAM

## 2025-07-22 PROCEDURE — 25000003 PHARM REV CODE 250: Performed by: PHYSICIAN ASSISTANT

## 2025-07-22 PROCEDURE — 83615 LACTATE (LD) (LDH) ENZYME: CPT | Performed by: STUDENT IN AN ORGANIZED HEALTH CARE EDUCATION/TRAINING PROGRAM

## 2025-07-22 PROCEDURE — 84100 ASSAY OF PHOSPHORUS: CPT | Performed by: STUDENT IN AN ORGANIZED HEALTH CARE EDUCATION/TRAINING PROGRAM

## 2025-07-22 PROCEDURE — 94799 UNLISTED PULMONARY SVC/PX: CPT

## 2025-07-22 RX ORDER — CHOLESTYRAMINE 4 G/9G
1 POWDER, FOR SUSPENSION ORAL 2 TIMES DAILY
Status: DISCONTINUED | OUTPATIENT
Start: 2025-07-22 | End: 2025-07-25 | Stop reason: HOSPADM

## 2025-07-22 RX ORDER — GLUCAGON 1 MG
1 KIT INJECTION
Status: DISCONTINUED | OUTPATIENT
Start: 2025-07-22 | End: 2025-07-25 | Stop reason: HOSPADM

## 2025-07-22 RX ORDER — POTASSIUM CHLORIDE 20 MEQ/1
40 TABLET, EXTENDED RELEASE ORAL ONCE
Status: COMPLETED | OUTPATIENT
Start: 2025-07-22 | End: 2025-07-22

## 2025-07-22 RX ORDER — POTASSIUM CHLORIDE 750 MG/1
50 CAPSULE, EXTENDED RELEASE ORAL ONCE
Status: COMPLETED | OUTPATIENT
Start: 2025-07-22 | End: 2025-07-22

## 2025-07-22 RX ORDER — SODIUM CHLORIDE 9 MG/ML
INJECTION, SOLUTION INTRAVENOUS ONCE
Status: CANCELLED | OUTPATIENT
Start: 2025-07-23

## 2025-07-22 RX ORDER — VENLAFAXINE 37.5 MG/1
37.5 TABLET ORAL DAILY
Status: DISCONTINUED | OUTPATIENT
Start: 2025-07-22 | End: 2025-07-25 | Stop reason: HOSPADM

## 2025-07-22 RX ORDER — DRONABINOL 2.5 MG/1
2.5 CAPSULE ORAL 2 TIMES DAILY
Status: DISCONTINUED | OUTPATIENT
Start: 2025-07-22 | End: 2025-07-24

## 2025-07-22 RX ORDER — IBUPROFEN 200 MG
24 TABLET ORAL
Status: DISCONTINUED | OUTPATIENT
Start: 2025-07-22 | End: 2025-07-25 | Stop reason: HOSPADM

## 2025-07-22 RX ORDER — AMIODARONE HYDROCHLORIDE 200 MG/1
400 TABLET ORAL DAILY
Status: DISCONTINUED | OUTPATIENT
Start: 2025-07-22 | End: 2025-07-25 | Stop reason: HOSPADM

## 2025-07-22 RX ORDER — WARFARIN 1 MG/1
1 TABLET ORAL
Status: DISCONTINUED | OUTPATIENT
Start: 2025-07-22 | End: 2025-07-23

## 2025-07-22 RX ORDER — DIPHENOXYLATE HYDROCHLORIDE AND ATROPINE SULFATE 2.5; .025 MG/1; MG/1
2 TABLET ORAL 4 TIMES DAILY PRN
Status: DISCONTINUED | OUTPATIENT
Start: 2025-07-22 | End: 2025-07-25 | Stop reason: HOSPADM

## 2025-07-22 RX ORDER — IBUPROFEN 200 MG
16 TABLET ORAL
Status: DISCONTINUED | OUTPATIENT
Start: 2025-07-22 | End: 2025-07-25 | Stop reason: HOSPADM

## 2025-07-22 RX ORDER — INSULIN ASPART 100 [IU]/ML
0-5 INJECTION, SOLUTION INTRAVENOUS; SUBCUTANEOUS
Status: DISCONTINUED | OUTPATIENT
Start: 2025-07-22 | End: 2025-07-25 | Stop reason: HOSPADM

## 2025-07-22 RX ORDER — LANOLIN ALCOHOL/MO/W.PET/CERES
400 CREAM (GRAM) TOPICAL ONCE
Status: DISCONTINUED | OUTPATIENT
Start: 2025-07-22 | End: 2025-07-22

## 2025-07-22 RX ORDER — WARFARIN 1 MG/1
1 TABLET ORAL DAILY
Status: DISCONTINUED | OUTPATIENT
Start: 2025-07-22 | End: 2025-07-22

## 2025-07-22 RX ORDER — MUPIROCIN 20 MG/G
OINTMENT TOPICAL 2 TIMES DAILY
Status: DISCONTINUED | OUTPATIENT
Start: 2025-07-22 | End: 2025-07-25 | Stop reason: HOSPADM

## 2025-07-22 RX ADMIN — HYDRALAZINE HYDROCHLORIDE 75 MG: 25 TABLET ORAL at 05:07

## 2025-07-22 RX ADMIN — AMIODARONE HYDROCHLORIDE 400 MG: 200 TABLET ORAL at 09:07

## 2025-07-22 RX ADMIN — MUPIROCIN: 20 OINTMENT TOPICAL at 09:07

## 2025-07-22 RX ADMIN — MUPIROCIN: 20 OINTMENT TOPICAL at 10:07

## 2025-07-22 RX ADMIN — Medication 1 CAPSULE: at 09:07

## 2025-07-22 RX ADMIN — TRAZODONE HYDROCHLORIDE 25 MG: 50 TABLET ORAL at 09:07

## 2025-07-22 RX ADMIN — POTASSIUM CHLORIDE 50 MEQ: 750 CAPSULE, EXTENDED RELEASE ORAL at 01:07

## 2025-07-22 RX ADMIN — DRONABINOL 2.5 MG: 2.5 CAPSULE ORAL at 09:07

## 2025-07-22 RX ADMIN — HYDRALAZINE HYDROCHLORIDE 75 MG: 25 TABLET ORAL at 09:07

## 2025-07-22 RX ADMIN — CHOLESTYRAMINE 4 G: 4 POWDER, FOR SUSPENSION ORAL at 10:07

## 2025-07-22 RX ADMIN — CHOLESTYRAMINE 4 G: 4 POWDER, FOR SUSPENSION ORAL at 09:07

## 2025-07-22 RX ADMIN — Medication 400 MG: at 09:07

## 2025-07-22 RX ADMIN — POTASSIUM CHLORIDE 40 MEQ: 1500 TABLET, EXTENDED RELEASE ORAL at 05:07

## 2025-07-22 RX ADMIN — HYDRALAZINE HYDROCHLORIDE 75 MG: 25 TABLET ORAL at 01:07

## 2025-07-22 RX ADMIN — VENLAFAXINE 37.5 MG: 37.5 TABLET ORAL at 09:07

## 2025-07-22 RX ADMIN — WARFARIN SODIUM 1 MG: 1 TABLET ORAL at 05:07

## 2025-07-22 NOTE — PLAN OF CARE
Pt AAOx4. Fall precautions remain in place. Sternal precautions maintained. Pt remains on room air.                                                                                                                                                                                                                                                           Pt AAOx4. Fall precautions remain in place. Sternal precautions maintained. Pt remains on room air. NSR on telemetry with LVAD artifact. LVAD hum present and smooth. VAD numbers and dopplers WDL. DLES dressing CDI. K replaced this morning. Pt denies chest pain, headache, and SOB. No acute distress noted,  plan of care continues.    Problem: Adult Inpatient Plan of Care  Goal: Plan of Care Review  Outcome: Progressing  Goal: Patient-Specific Goal (Individualized)  Outcome: Progressing  Goal: Absence of Hospital-Acquired Illness or Injury  Outcome: Progressing  Goal: Optimal Comfort and Wellbeing  Outcome: Progressing  Goal: Readiness for Transition of Care  Outcome: Progressing     Problem: Diabetes Comorbidity  Goal: Blood Glucose Level Within Targeted Range  Outcome: Progressing     Problem: Infection  Goal: Absence of Infection Signs and Symptoms  Outcome: Progressing     Problem: Wound  Goal: Optimal Coping  Outcome: Progressing  Goal: Optimal Functional Ability  Outcome: Progressing  Goal: Absence of Infection Signs and Symptoms  Outcome: Progressing  Goal: Improved Oral Intake  Outcome: Progressing  Goal: Optimal Pain Control and Function  Outcome: Progressing  Goal: Skin Health and Integrity  Outcome: Progressing  Goal: Optimal Wound Healing  Outcome: Progressing     Problem: Fall Injury Risk  Goal: Absence of Fall and Fall-Related Injury  Outcome: Progressing     Problem: Skin Injury Risk Increased  Goal: Skin Health and Integrity  Outcome: Progressing     Problem: Ventricular Assist Device  Goal: Optimal Adjustment to Device  Outcome: Progressing  Goal: Absence of  Bleeding  Outcome: Progressing  Goal: Absence of Embolism Signs and Symptoms  Outcome: Progressing  Goal: Optimal Blood Flow  Outcome: Progressing  Goal: Absence of Infection Signs and Symptoms  Outcome: Progressing  Goal: Effective Right-Sided Heart Function  Outcome: Progressing

## 2025-07-22 NOTE — PROCEDURES
"Radha Abbott is a 65 y.o. male patient.    Temp: 98 °F (36.7 °C) (07/22/25 1200)  Pulse: 90 (07/22/25 1200)  Resp: 18 (07/22/25 1200)  BP: 109/72 (07/22/25 1205)  SpO2: 96 % (07/22/25 1233)  Weight: 76.4 kg (168 lb 6.9 oz) (07/22/25 0517)  Height: 6' 1" (185.4 cm) (07/22/25 0517)    Procedures        7/22/2025    12:00 PM 7/22/2025     8:00 AM 7/22/2025     4:20 AM 7/21/2025    11:50 PM 7/14/2025     4:00 PM 7/14/2025    11:58 AM 7/14/2025     8:00 AM   TXP LVAD INTERROGATIONS   Type HeartMate3 HeartMate3 HeartMate3 HeartMate3 HeartMate3 HeartMate3 HeartMate3   Flow 3.2 3.4 3.2 3 2.8 2.9 3.1   Speed 5000 5000 5000 5000 5000 5000 5000   PI 6.9 6.6 7.5 8 8.6 8.9 8.5   Power ([place]) 3.3 3.4 3.3 3.4 3.5 3.4 3.5   LSL 4600 4600 4600 4600 4600 4600 4600   Pulsatility Pulse Intermittent pulse Pulse Pulse Pulse Pulse Pulse   Interrogation of Ventricular assist device was performed with physician analysis of device parameters and review of device function. I have personally reviewed the interrogation findings and agree with findings as stated.         7/22/2025    "

## 2025-07-22 NOTE — CONSULTS
[facility] - Cardiology Stepdown  Palliative Medicine  Consult Note    Patient Name: Radha Abbott  MRN: [id number]  Admission Date: 7/21/2025  Hospital Length of Stay: 0 days  Code Status: Full Code   Attending Provider: Walter Mcintyre MD  Consulting Provider: ROHIT Rose  Primary Care Physician: Vasu Kong MD  Principal Problem:Failure to thrive in adult    Patient information was obtained from patient, spouse/SO, past medical records, and primary team.      Inpatient consult to Palliative Care  Consult performed by: Cheri Montes CNS  Consult ordered by: Dougie Pandey MD  Reason for consult: goc/acp        Assessment/Plan:     Palliative Care  Palliative care encounter  Pal med consulted for goals of care and advanced care planning for Mr. Abbott a 66 yo gentleman with PMH of tsage D HFrEF 2/2 ICM s/p HM3 as DT (12/1/2023), ESRD on HD (MWF). Ad[facility] to [facility] with debility -  bilateral leg weakness and concerns for failure to thrive.  At time of this encounter Mr. Abbott is awake, alert and oriented.  Reports having no c/o pain or discomforts.    Wife is at bedside.     Pal med APRN and LCSW met with patient and family at bedside.  Pal med introduced   Advance Care Planning    Date: 07/22/2025    ACP Reviewed/No Changes  Voluntary advance care planning discussion had today with patient and spouse. Previously completed HCPOA in electronic medical record is current, no changes made.    Advance Care Planning    Date: 07/22/2025    GOC  - Pal med re-introduced as patient. Last pal med encounter during VAD preparedness.  Wife states understanding VAD was for DT   - Mr. Abbott did not appear to have the same understanding  of what DT means for him.    - During this conversation we discussed goals of care going forward , in light of the patient's change in clinical status,  -pal med expressed concern with the significant changes in clinical status - HD, debility, unexpected weight loss, poor nutritional  status and repeat hospitalizations for similar complaints is leading to adult failure to thrive.    -  Pal med asked patient and wife if the benefit of VAD outweighs the impact on quality of life.   Patient indicates he is unable to spend as much time with his grandchildren. He also indicates the VAD has been working great and that only half of his body is his own.    - pal med gently asked if it is time to discuss  discontinuing the VAD and  focusing on comfort.   - Hospice introduced in response to family questions.   Wife amenable of having additional information.  Discussed differences in palliative medicine and hospice care.  Philosophy of hospice  in relationship to Mr. Abbott discussed. Patient and family are aware HD would not be continued.  Pal med explained a decision is not needed today.   - Mr. Abbott states he is not amenable to stopping HD and is not amenable to hospice at this time. Patient and family are amenable to continued discussions.   - Follow up in out patient pal med clinic recommended.  Patient has an appointment scheduled.    - We explored the patient's values and preferences for future care.  The patient and family endorses that what is most important right now is to focus on quality of life, even if it means sacrificing a little time, extending life as long as possible, even it it means sacrificing quality, and curative/life-prolongation (regardless of treatment burdens)    Accordingly, we have decided that the best plan to meet the patient's goals includes continuing with treatment    A total of 20  min was spent on advance care planning, goals of care discussion, emotional support, formulating and communicating prognosis and exploring burden/benefit of various approaches of treatment. This discussion occurred on a fully voluntary basis with the verbal consent of the patient and/or family.       Symptom Management   - no c/o pain or discomfort   - no comfort medications in use             Life limiting medical diagnosis   Failure to thrive in adult - moderate protein calorie malnutrition   Chronic combined systolic and diastolic heart failure Class 4   End stage Renal Disease   - managed per primary team and specialty consultants   -s/p DT VAD, HM3 January 2023  - EF 10 -15%  - HD MWF  - pal med following for goals of care and advanced care planning - see above   - current clinical condition appropriate for transition to hospice care     Recommendations   - continue current plan of care   - patient and family would benefit from continued information regarding plan of care, prognosis and what to expect in the future.  - patient has scheduled outpatient pal med appointment.  At request of family appointment will be changed to virtual.  Referral placed.      Primary team attending, and ENRRIQUE notified of the above.   Thank you for consult and opportunity to participate in Mr. Abbott's care.            Thank you for your consult. I will follow-up with patient. Please contact us if you have any additional questions.    Subjective:     HPI:   HPI obtained from chart review.      As per H and P Mr. Abbott is a 66 yo gentleman with PMH of:  stage D HFrEF 2/2 ICM s/p HM3 as DT (12/1/2023), ESRD on HD (MWF), and debility here for bilateral leg weakness.       Pertinent history below:     5/9-11: Patient was recently hospitalized for anemia and C-difficile infection. At that time patient received a unit of blood, hydrated for low flow alarms, hydralazine given to target MAPs to 80s with Hydral 50mg TID, then put on extended vancomycin course with taper for discharge on 5/11     He was admitted again for ongoing diarrhea and low-flow alarms from 05/23 - 05/26/2025 and treated with IV fluids.  His last clinic visit with heart transplant Service with Dr. Ocampo on 06/11/2023.  His C diff report was positive on August 2024, 05/10/2025.  He was negative for C diff infection on 05/23/2025, 07/10/2025     Patient was  admitted for weakness, diarrhea, loss of appetite with low-flow alarms from 7/10-7/14/25 and repeat  C diff testing on 7/12 was negative also.  He was recommended supportive care with probiotics, and Lomotil.      Patient and wife bedside reports complaints of progressive weakness in both legs for past 1 week associated with loss of appetite.  Patient and wife reports significant improvement in diarrhea since discharge and with Lomotil and supportive care.  Patient reports that he fell on his back a week ago because of weakness.  He also reports he visited emergency room where CAT scan had was done.  Patient's wife is also reporting that she has noticed some speech disturbance for past 1 week also he is dragging his right foot when he has walks.  Patient reports shortness of breath with minimal exertion but denies any active chest pain, lightheadedness, palpitations.  Denies any low-flow alarms at home.  Patient is getting admitted under the heart transplant Service for further management.    Pal med consulted for goals of care and advanced care planning          Hospital Course:  No notes on file    Interval History:     Past Medical History:   Diagnosis Date    Aspiration pneumonia of both lungs 06/17/2024    CAD (coronary artery disease)     CHF (congestive heart failure)     Delirium 06/16/2024    Diabetes mellitus     HFrEF (heart failure with reduced ejection fraction)     Hypoglycemia 06/12/2024    ICD (implantable cardioverter-defibrillator) in place     LVAD (left ventricular assist device) present 12/01/2023    MI, old     PAF (paroxysmal atrial fibrillation) 10/11/2023    Stage 4 chronic kidney disease 02/15/2024    Type 2 diabetes mellitus without complication, without long-term current use of insulin 10/07/2023       Past Surgical History:   Procedure Laterality Date    ANGIOPLASTY-VENOUS ARTERY Right 12/1/2023    Procedure: ANGIOPLASTY-VENOUS ARTERY, RIGHT FEMORAL;  Surgeon: Yuri Washington MD;   Location: [facility] OR 2ND FLR;  Service: Cardiovascular;  Laterality: Right;    AORTIC VALVULOPLASTY N/A 12/1/2023    Procedure: REPAIR, AORTIC VALVE;  Surgeon: Yuri Washington MD;  Location: [facility] OR 2ND FLR;  Service: Cardiovascular;  Laterality: N/A;    CARDIAC SURGERY      CLOSURE N/A 12/1/2023    Procedure: CLOSURE, TEMPORARY;  Surgeon: Yuri Washington MD;  Location: [facility] OR 2ND FLR;  Service: Cardiovascular;  Laterality: N/A;    DRAINAGE OF PLEURAL EFFUSION  12/4/2023    Procedure: DRAINAGE, PLEURAL EFFUSION;  Surgeon: Yuri Washington MD;  Location: [facility] OR 2ND FLR;  Service: Cardiovascular;;    INSERTION OF GRAFT TO PERICARDIUM  12/4/2023    Procedure: INSERTION, GRAFT, PERICARDIUM;  Surgeon: Yuri Washington MD;  Location: [facility] OR 2ND FLR;  Service: Cardiovascular;;    INSERTION OF INTRA-AORTIC BALLOON ASSIST DEVICE Right 11/21/2023    Procedure: INSERTION, INTRA-AORTIC BALLOON PUMP;  Surgeon: Finn Cohn MD;  Location: [facility] CATH LAB;  Service: Cardiology;  Laterality: Right;    LEFT VENTRICULAR ASSIST DEVICE Left 12/1/2023    Procedure: INSERTION-LEFT VENTRICULAR ASSIST DEVICE;  Surgeon: Yuri Washington MD;  Location: [facility] OR 2ND FLR;  Service: Cardiovascular;  Laterality: Left;  REDO STERNOTOMY - REDO SAW NEEDED FOR CASE    LYSIS OF ADHESIONS  12/1/2023    Procedure: LYSIS, ADHESIONS;  Surgeon: Yuri Washington MD;  [facility];  Service: Cardiovascular;;    PLACEMENT OF SWAN ROLANDO CATHETER WITH IMAGING GUIDANCE  11/20/2023    Procedure: INSERTION, CATHETER, SWAN-ROLANDO, WITH IMAGING GUIDANCE;  Surgeon: Sajan Hurley MD;  Location: [facility] CATH LAB;  Service: Cardiology;;    REMOVAL OF TUNNELED CENTRAL VENOUS CATHETER (CVC) N/A 3/1/2024    Procedure: REMOVAL, CATHETER, CENTRAL VENOUS, TUNNELED;  Surgeon: Seble Aguilar MD;  Location: [facility] CATH LAB;  Service: Interventional Nephrology;  Laterality: N/A;    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 12/1/2023    Procedure: REPAIR,  ANEURYSM, ARTERY, FEMORAL;  Surgeon: Yuri Washington MD;  Location: [facility] OR [facility] FLR;  Service: Cardiovascular;  Laterality: Right;  Right Femoral Artery Repair    RIGHT HEART CATHETERIZATION Right 10/10/2023    Procedure: INSERTION, CATHETER, RIGHT HEART;  Surgeon: Bin Gandhi MD;  Location: [facility] CATH LAB;  Service: Cardiology;  Laterality: Right;    RIGHT HEART CATHETERIZATION Right 10/13/2023    Procedure: INSERTION, CATHETER, RIGHT HEART;  Surgeon: Walter Mcintyre MD;  Location: [facility] CATH LAB;  Service: Cardiology;  Laterality: Right;    RIGHT HEART CATHETERIZATION  11/13/2023    RIGHT HEART CATHETERIZATION Right 11/13/2023    Procedure: INSERTION, CATHETER, RIGHT HEART;  Surgeon: Juventino Bermudez Jr., MD;  Location: [facility] CATH LAB;  Service: Cardiology;  Laterality: Right;    RIGHT HEART CATHETERIZATION Right 11/20/2023    Procedure: INSERTION, CATHETER, RIGHT HEART;  Surgeon: Sajan Hurley MD;  Location: [facility] CATH LAB;  Service: Cardiology;  Laterality: Right;    RIGHT HEART CATHETERIZATION Right 1/22/2024    Procedure: INSERTION, CATHETER, RIGHT HEART;  Surgeon: Brayan Ocampo MD;  Location: [facility] CATH LAB;  Service: Cardiology;  Laterality: Right;    STERNAL WOUND CLOSURE N/A 12/4/2023    Procedure: CLOSURE, WOUND, STERNUM;  Surgeon: Yuri Washington MD;  [address]  Service: Cardiovascular;  Laterality: N/A;    STERNOTOMY N/A 12/1/2023    Procedure: STERNOTOMY, REDO;  Surgeon: Yuri Washington MD;  [address]  Service: Cardiovascular;  Laterality: N/A;    VALVULOPLASTY, MITRAL VALVE N/A 12/1/2023    Procedure: VALVULOPLASTY, MITRAL VALVE;  Surgeon: Yuri Washington MD;  [facility];  Service: Cardiovascular;  Laterality: N/A;       Review of patient's allergies indicates:  No Known Allergies    Medications:  Continuous Infusions:  Scheduled Meds:   amiodarone  400 mg Oral Daily    cholestyramine  1 packet Oral BID    droNABinol  2.5 mg Oral BID     hydrALAZINE  75 mg Oral Q8H    Lactobacillus rhamnosus GG  1 capsule Oral Daily    mupirocin   Nasal BID    venlafaxine  37.5 mg Oral Daily    warfarin  1 mg Oral Once per day on Sunday Tuesday Thursday Saturday    And    [START ON 7/23/2025] warfarin  0.5 mg Oral Once per day on Monday Wednesday Friday     PRN Meds:  Current Facility-Administered Medications:     dextrose 50%, 12.5 g, Intravenous, PRN    dextrose 50%, 25 g, Intravenous, PRN    diphenoxylate-atropine 2.5-0.025 mg, 2 tablet, Oral, QID PRN    glucagon (human recombinant), 1 mg, Intramuscular, PRN    glucose, 16 g, Oral, PRN    glucose, 24 g, Oral, PRN    insulin aspart U-100, 0-5 Units, Subcutaneous, QID (AC + HS) PRN    Family History    None       Tobacco Use    Smoking status: Former     Current packs/day: 0.50     Types: Cigarettes    Smokeless tobacco: Not on file   Vaping Use    Vaping status: Never Used   Substance and Sexual Activity    Alcohol use: Yes     Comment: rarely    Drug use: No    Sexual activity: Not Currently     Partners: Female       Review of Systems   Constitutional:  Positive for activity change, appetite change and unexpected weight change.   Respiratory:  Positive for shortness of breath.    Cardiovascular:  Positive for leg swelling.   Gastrointestinal:  Positive for diarrhea.   Neurological:  Positive for weakness.   Psychiatric/Behavioral:  Negative for agitation, behavioral problems and confusion.      Objective:     Vital Signs (Most Recent):  Temp: 98 °F (36.7 °C) (07/22/25 1145)  Pulse: 89 (07/22/25 1145)  Resp: 18 (07/22/25 1145)  BP: 100/78 (07/22/25 0810)  SpO2: 96 % (07/22/25 1233) Vital Signs (24h Range):  Temp:  [97.9 °F (36.6 °C)-99.4 °F (37.4 °C)] 98 °F (36.7 °C)  Pulse:  [80-92] 89  Resp:  [15-18] 18  SpO2:  [96 %-100 %] 96 %  BP: ()/(0-91) 100/78     Weight: 76.4 kg (168 lb 6.9 oz)  Body mass index is 22.22 kg/m².       Physical Exam  Vitals and nursing note reviewed.   Constitutional:       General:  He is not in acute distress.  Cardiovascular:      Rate and Rhythm: Normal rate.      Comments: LVAD hum  Pulmonary:      Effort: Pulmonary effort is normal.   Abdominal:      Tenderness: There is no abdominal tenderness.   Genitourinary:     Comments: Chronic diarrhea  Musculoskeletal:         General: Normal range of motion.      Cervical back: Normal range of motion.   Neurological:      Mental Status: He is alert and oriented to person, place, and time.   Psychiatric:         Mood and Affect: Mood normal.         Behavior: Behavior normal.         Thought Content: Thought content normal.         Judgment: Judgment normal.          Review of Symptoms      Symptom Assessment (ESAS 0-10 Scale)  Pain:  0  Dyspnea:  0  Anxiety:  0  Nausea:  0  Depression:  0  Anorexia:  0  Fatigue:  0  Insomnia:  0  Restlessness:  0  Agitation:  0     CAM / Delirium:  Negative  Constipation:  Negative  Diarrhea:  Positive      Bowel Management Plan (BMP):  Yes      Pain Assessment:  OME in 24 hours:  0  Location(s):      Performance Status:  70    Living Arrangements:  Lives with spouse    Psychosocial/Cultural:   See Palliative Psychosocial Note: Yes  , lives with wife, has adult children and several grandchildren. Worked a  until heart failure disability,  enjoys time with grandchildren.    **Primary  to Follow**  Palliative Care  Consult: Yes    Spiritual:  F - Mavis and Belief:  [employer]   I - Importance:  Still attends [employer] services regularly    is the patient's brother-in-law  C - Community:  Not actively involved in community   A - Address in Care:   services offered.            Advance Care Planning            Significant Labs: All pertinent labs within the past 24 hours have been reviewed.  CBC:   Recent Labs   Lab 07/22/25  0339   WBC 4.45   HGB 9.7*   HCT 30.5*   MCV 91   *     BMP:  Recent Labs   Lab 07/22/25  0339   GLU 72   *   K 3.3*   CL  105   CO2 23   BUN 18   CREATININE 3.7*   CALCIUM 7.3*   MG 1.7     LFT:  Lab Results   Component Value Date    AST 98 (H) 07/22/2025    ALKPHOS 114 07/22/2025    BILITOT 0.5 07/22/2025     Albumin:   Albumin   Date Value Ref Range Status   07/22/2025 2.0 (L) 3.5 - 5.2 g/dL Final   03/20/2025 2.5 (L) 3.5 - 5.2 g/dL Final     Protein:   Protein Total   Date Value Ref Range Status   07/22/2025 5.3 (L) 6.0 - 8.4 gm/dL Final     Total Protein   Date Value Ref Range Status   03/20/2025 8.0 6.0 - 8.4 g/dL Final     Lactic acid:   Lab Results   Component Value Date    LACTATE 0.8 07/21/2025    LACTATE 1.4 07/11/2025    LACTATE 1.5 07/11/2025       Significant Imaging: I have reviewed all pertinent imaging results/findings within the past 24 hours.          CAROLINA Sims, BRADLEYS-BC  Palliative Medicine  [facility] - Cardiology Stepdown

## 2025-07-22 NOTE — HPI
HPI obtained from chart review.      As per H and P Mr. Abbott is a 64 yo gentleman with PMH of:  stage D HFrEF 2/2 ICM s/p HM3 as DT (12/1/2023), ESRD on HD (MWF), and debility here for bilateral leg weakness.       Pertinent history below:     5/9-11: Patient was recently hospitalized for anemia and C-difficile infection. At that time patient received a unit of blood, hydrated for low flow alarms, hydralazine given to target MAPs to 80s with Hydral 50mg TID, then put on extended vancomycin course with taper for discharge on 5/11     He was admitted again for ongoing diarrhea and low-flow alarms from 05/23 - 05/26/2025 and treated with IV fluids.  His last clinic visit with heart transplant Service with Dr. Ocampo on 06/11/2023.  His C diff report was positive on August 2024, 05/10/2025.  He was negative for C diff infection on 05/23/2025, 07/10/2025     Patient was admitted for weakness, diarrhea, loss of appetite with low-flow alarms from 7/10-7/14/25 and repeat  C diff testing on 7/12 was negative also.  He was recommended supportive care with probiotics, and Lomotil.      Patient and wife bedside reports complaints of progressive weakness in both legs for past 1 week associated with loss of appetite.  Patient and wife reports significant improvement in diarrhea since discharge and with Lomotil and supportive care.  Patient reports that he fell on his back a week ago because of weakness.  He also reports he visited emergency room where CAT scan had was done.  Patient's wife is also reporting that she has noticed some speech disturbance for past 1 week also he is dragging his right foot when he has walks.  Patient reports shortness of breath with minimal exertion but denies any active chest pain, lightheadedness, palpitations.  Denies any low-flow alarms at home.  Patient is getting admitted under the heart transplant Service for further management.    Pal med consulted for goals of care and advanced care planning

## 2025-07-22 NOTE — ASSESSMENT & PLAN NOTE
Pal rasta consulted for goals of care and advanced care planning for Mr. Abbott a 66 yo gentleman with PMH of tsage D HFrEF 2/2 ICM s/p HM3 as DT (12/1/2023), ESRD on HD (MWF). Admitted to [facility] with debility -  bilateral leg weakness and concerns for failure to thrive.  At time of this encounter Mr. Abbott is awake, alert and oriented.  Reports having no c/o pain or discomforts.    Wife is at bedside.     Pal rasta APRN and LCSW met with patient and family at bedside.  Pal med introduced   Advance Care Planning     Date: 07/22/2025    ACP Reviewed/No Changes  Voluntary advance care planning discussion had today with patient and spouse. Previously completed HCPOA in electronic medical record is current, no changes made.    Advance Care Planning     Date: 07/22/2025    [facility]  - Everton magdaleno re-introduced as patient. Last pal med encounter during VAD preparedness.  Wife states understanding VAD was for DT   - Mr. Abbott did not appear to have the same understanding  of what DT means for him.    - During this conversation we discussed goals of care going forward , in light of the patient's change in clinical status,  -pal med expressed concern with the significant changes in clinical status - HD, debility, unexpected weight loss, poor nutritional status and repeat hospitalizations for similar complaints is leading to adult failure to thrive.    -  Pal med asked patient and wife if the benefit of VAD outweighs the impact on quality of life.   Patient indicates he is unable to spend as much time with his grandchildren. He also indicates the VAD has been working great and that only half of his body is his own.    - pal med gently asked if it is time to discuss  discontinuing the VAD and  focusing on comfort.   - Hospice introduced in response to family questions.   Wife amenable of having additional information.  Discussed differences in palliative medicine and hospice care.  Philosophy of hospice  in relationship to Mr. Abbott discussed.  Patient and family are aware HD would not be continued.  Pal med explained a decision is not needed today.   - Mr. Abbott states he is not amenable to stopping HD and is not amenable to hospice at this time. Patient and family are amenable to continued discussions.   - Follow up in out patient pal med clinic recommended.  Patient has an appointment scheduled.    - We explored the patient's values and preferences for future care.  The patient and family endorses that what is most important right now is to focus on quality of life, even if it means sacrificing a little time, extending life as long as possible, even it it means sacrificing quality, and curative/life-prolongation (regardless of treatment burdens)    Accordingly, we have decided that the best plan to meet the patient's goals includes continuing with treatment    A total of 20  min was spent on advance care planning, goals of care discussion, emotional support, formulating and communicating prognosis and exploring burden/benefit of various approaches of treatment. This discussion occurred on a fully voluntary basis with the verbal consent of the patient and/or family.       Symptom Management   - no c/o pain or discomfort   - no comfort medications in use            Life limiting medical diagnosis   Failure to thrive in adult - moderate protein calorie malnutrition   Chronic combined systolic and diastolic heart failure Class 4   End stage Renal Disease   - managed per primary team and specialty consultants   -s/p DT VAD, HM3 January 2023  - EF 10 -15%  - HD MWF  - pal med following for goals of care and advanced care planning - see above   - current clinical condition appropriate for transition to hospice care     Recommendations   - continue current plan of care   - patient and family would benefit from continued information regarding plan of care, prognosis and what to expect in the future.  - patient has scheduled outpatient pal med appointment.  At  request of family appointment will be changed to virtual.  Referral placed.      Primary team attending, and ENRRIQUE notified of the above.   Thank you for consult and opportunity to participate in Mr. Abbott's care.

## 2025-07-22 NOTE — H&P
[facility] - Cardiology Stepdown  Transplant Heart  History and Physical     Admission Date: 7/21/2025  Code Status: Full Code   Attending Provider: Walter Mcintyre MD   Principal Problem:Weakness    Subjective:       HPI:  65 M PMHx of stage D HFrEF 2/2 ICM s/p HM3 as DT (12/1/2023), ESRD on HD (MWF), and debility here for bilateral leg weakness.      Pertinent history below:     5/9-11: Patient was recently hospitalized for anemia and C-difficile infection. At that time patient received a unit of blood, hydrated for low flow alarms, hydralazine given to target MAPs to 80s with Hydral 50mg TID, then put on extended vancomycin course with taper for discharge on 5/11     He was admitted again for ongoing diarrhea and low-flow alarms from 05/23 - 05/26/2025 and treated with IV fluids.  His last clinic visit with heart transplant Service with Dr. Ocampo on 06/11/2023.  His C diff report was positive on August 2024, 05/10/2025.  He was negative for C diff infection on 05/23/2025, 07/10/2025     Patient was admitted for weakness, diarrhea, loss of appetite with low-flow alarms from 7/10-7/14/25 and repeat  C diff testing on 7/12 was negative also.  He was recommended supportive care with probiotics, and Lomotil.     Patient and wife bedside reports complaints of progressive weakness in both legs for past 1 week associated with loss of appetite.  Patient and wife reports significant improvement in diarrhea since discharge and with Lomotil and supportive care.  Patient reports that he fell on his back a week ago because of weakness.  He also reports he visited emergency room where CAT scan had was done.  Patient's wife is also reporting that she has noticed some speech disturbance for past 1 week also he is dragging his right foot when he has walks.  Patient reports shortness of breath with minimal exertion but denies any active chest pain, lightheadedness, palpitations.  Denies any low-flow alarms at home.  Patient is getting  admitted under the heart transplant Service for further management.    Past Medical History:   Diagnosis Date    Aspiration pneumonia of both lungs 06/17/2024    CAD (coronary artery disease)     CHF (congestive heart failure)     Delirium 06/16/2024    Diabetes mellitus     HFrEF (heart failure with reduced ejection fraction)     Hypoglycemia 06/12/2024    ICD (implantable cardioverter-defibrillator) in place     LVAD (left ventricular assist device) present 12/01/2023    MI, old     PAF (paroxysmal atrial fibrillation) 10/11/2023    Stage 4 chronic kidney disease 02/15/2024    Type 2 diabetes mellitus without complication, without long-term current use of insulin 10/07/2023       Past Surgical History:   Procedure Laterality Date    ANGIOPLASTY-VENOUS ARTERY Right 12/1/2023    Procedure: ANGIOPLASTY-VENOUS ARTERY, RIGHT FEMORAL;  Surgeon: Yuri Washington MD;  [facility];  Service: Cardiovascular;  Laterality: Right;    AORTIC VALVULOPLASTY N/A 12/1/2023    Procedure: REPAIR, AORTIC VALVE;  Surgeon: Yuri Washington MD;  [facility];  Service: Cardiovascular;  Laterality: N/A;    CARDIAC SURGERY      CLOSURE N/A 12/1/2023    Procedure: CLOSURE, TEMPORARY;  Surgeon: Yuri Washington MD;  Location: [facility] OR [facility];  Service: Cardiovascular;  Laterality: N/A;    DRAINAGE OF PLEURAL EFFUSION  12/4/2023    Procedure: DRAINAGE, PLEURAL EFFUSION;  Surgeon: Yuri Washington MD;  [facility];  Service: Cardiovascular;;    INSERTION OF GRAFT TO PERICARDIUM  12/4/2023    Procedure: INSERTION, GRAFT, PERICARDIUM;  Surgeon: Yuri Washington MD;  [facility];  Service: Cardiovascular;;    INSERTION OF INTRA-AORTIC BALLOON ASSIST DEVICE Right 11/21/2023    Procedure: INSERTION, INTRA-AORTIC BALLOON PUMP;  Surgeon: Finn Cohn MD;  Location: [facility] CATH LAB;  Service: Cardiology;  Laterality: Right;    LEFT VENTRICULAR ASSIST DEVICE Left 12/1/2023    Procedure:  INSERTION-LEFT VENTRICULAR ASSIST DEVICE;  Surgeon: Yuri Washington MD;  Location: [facility] OR [facility] FLR;  Service: Cardiovascular;  Laterality: Left;  REDO STERNOTOMY - REDO SAW NEEDED FOR CASE    LYSIS OF ADHESIONS  12/1/2023    Procedure: LYSIS, ADHESIONS;  Surgeon: Yuri Washington MD;  Location: [facility] OR [facility] FLR;  Service: Cardiovascular;;    PLACEMENT OF SWAN ROLANDO CATHETER WITH IMAGING GUIDANCE  11/20/2023    Procedure: INSERTION, CATHETER, SWAN-ROLANDO, WITH IMAGING GUIDANCE;  Surgeon: Sajan Hurley MD;  Location: [facility] CATH LAB;  Service: Cardiology;;    REMOVAL OF TUNNELED CENTRAL VENOUS CATHETER (CVC) N/A 3/1/2024    Procedure: REMOVAL, CATHETER, CENTRAL VENOUS, TUNNELED;  Surgeon: Seble Aguilar MD;  Location: [facility] CATH LAB;  Service: Interventional Nephrology;  Laterality: N/A;    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 12/1/2023    Procedure: REPAIR, ANEURYSM, ARTERY, FEMORAL;  Surgeon: Yuri Washington MD;  [facility];  Service: Cardiovascular;  Laterality: Right;  Right Femoral Artery Repair    RIGHT HEART CATHETERIZATION Right 10/10/2023    Procedure: INSERTION, CATHETER, RIGHT HEART;  Surgeon: Bin Gandhi MD;  [facility] CATH LAB;  Service: Cardiology;  Laterality: Right;    RIGHT HEART CATHETERIZATION Right 10/13/2023    Procedure: INSERTION, CATHETER, RIGHT HEART;  Surgeon: Walter Mcintyre MD;  Location: [facility] CATH LAB;  Service: Cardiology;  Laterality: Right;    RIGHT HEART CATHETERIZATION  11/13/2023    RIGHT HEART CATHETERIZATION Right 11/13/2023    Procedure: INSERTION, CATHETER, RIGHT HEART;  Surgeon: Juventino Bermudez Jr., MD;  Location: [facility] CATH LAB;  Service: Cardiology;  Laterality: Right;    RIGHT HEART CATHETERIZATION Right 11/20/2023    Procedure: INSERTION, CATHETER, RIGHT HEART;  Surgeon: Sajan Hurley MD;  Location: [facility] CATH LAB;  Service: Cardiology;  Laterality: Right;    RIGHT HEART CATHETERIZATION Right 1/22/2024    Procedure: INSERTION,  CATHETER, RIGHT HEART;  Surgeon: Brayan Ocampo MD;  Location: [facility] CATH LAB;  Service: Cardiology;  Laterality: Right;    STERNAL WOUND CLOSURE N/A 12/4/2023    Procedure: CLOSURE, WOUND, STERNUM;  Surgeon: Yuri Washington MD;  Location: [facility] OR [facility] FLR;  Service: Cardiovascular;  Laterality: N/A;    STERNOTOMY N/A 12/1/2023    Procedure: STERNOTOMY, REDO;  Surgeon: Yuri Washington MD;  Location: [facility] OR [facility] FLR;  Service: Cardiovascular;  Laterality: N/A;    VALVULOPLASTY, MITRAL VALVE N/A 12/1/2023    Procedure: VALVULOPLASTY, MITRAL VALVE;  Surgeon: Yuri Washington MD;  [facility];  Service: Cardiovascular;  Laterality: N/A;       Review of patient's allergies indicates:  No Known Allergies    Current Facility-Administered Medications on File Prior to Encounter   Medication    [COMPLETED] warfarin (COUMADIN) split tablet 0.5 mg    [DISCONTINUED] warfarin (COUMADIN) split tablet 0.5 mg     Current Outpatient Medications on File Prior to Encounter   Medication Sig    amiodarone (PACERONE) 400 MG tablet Take 1 tablet (400 mg total) by mouth once daily.    diphenoxylate-atropine 2.5-0.025 mg (LOMOTIL) 2.5-0.025 mg per tablet Take 2 tablets by mouth 4 (four) times daily as needed for Diarrhea.    hydrALAZINE (APRESOLINE) 25 MG tablet Take 3 tablets (75 mg total) by mouth every 8 (eight) hours.    Lactobacillus rhamnosus GG (CULTURELLE) 10 billion cell capsule Take 1 capsule by mouth once daily.    potassium, sodium phosphates (PHOS-NAK) 280-160-250 mg PwPk Take 1 packet by mouth 4 (four) times daily before meals and nightly.    pulse oximeter (PULSE OXIMETER) device by Apply Externally route 2 (two) times a day. Use twice daily at 8 AM and 3 PM and [facility] the value in [facility] as directed.    traZODone (DESYREL) 50 MG tablet Take 0.5 tablets (25 mg total) by mouth nightly as needed for Insomnia.    venlafaxine (EFFEXOR) 37.5 MG Tab Take 1 tablet (37.5 mg total) by mouth once daily.    vitamin D  (VITAMIN D3) 1000 units Tab Take 1 tablet by mouth once daily    warfarin (COUMADIN) 1 MG tablet Take 1 tablet (1 mg total) by mouth every Tuesday, Thursday, Saturday, Sunday AND 0.5 tablets (0.5 mg total) every Mon, Wed, Fri. Or as directed by coumadin clinic.     Family History    None       Tobacco Use    Smoking status: Former     Current packs/day: 0.50     Types: Cigarettes    Smokeless tobacco: Not on file   Vaping Use    Vaping status: Never Used   Substance and Sexual Activity    Alcohol use: Yes     Comment: rarely    Drug use: No    Sexual activity: Not Currently     Partners: Female     Review of Systems   Constitutional: Positive for malaise/fatigue. Negative for chills and fever.   HENT:  Negative for congestion.    Eyes:  Negative for blurred vision and visual disturbance.   Cardiovascular:  Positive for dyspnea on exertion, orthopnea and paroxysmal nocturnal dyspnea. Negative for chest pain, irregular heartbeat, leg swelling, near-syncope, palpitations and syncope.   Respiratory:  Positive for shortness of breath. Negative for cough, sputum production and wheezing.    Skin:  Negative for color change.   Musculoskeletal:  Negative for arthritis and muscle weakness.   Gastrointestinal:  Negative for bloating, abdominal pain, change in bowel habit, nausea and vomiting.   Genitourinary:  Negative for dysuria.   Neurological:  Negative for dizziness and light-headedness.   Psychiatric/Behavioral:  The patient is not nervous/anxious.      Objective:     Vital Signs (Most Recent):  Temp: 98.4 °F (36.9 °C) (07/22/25 0313)  Pulse: 91 (07/22/25 0313)  Resp: 18 (07/22/25 0313)  BP: (!) 129/90 (07/22/25 0313)  SpO2: 97 % (07/22/25 0313) Vital Signs (24h Range):  Temp:  [98.2 °F (36.8 °C)-98.5 °F (36.9 °C)] 98.4 °F (36.9 °C)  Pulse:  [85-91] 91  Resp:  [15-18] 18  SpO2:  [97 %-100 %] 97 %  BP: ()/(0-91) 129/90          There is no height or weight on file to calculate BMI.    SpO2: 97 %        Physical  "Exam  Constitutional:       General: He is not in acute distress.     Appearance: Normal appearance. He is not ill-appearing.   HENT:      Head: Normocephalic and atraumatic.      Mouth/Throat:      Mouth: Mucous membranes are moist.   Eyes:      Extraocular Movements: Extraocular movements intact.      Pupils: Pupils are equal, round, and reactive to light.   Neck:      Comments: No JVD elevation  Cardiovascular:      Rate and Rhythm: Normal rate and regular rhythm.      Heart sounds: No murmur heard.     No friction rub. No gallop.   Pulmonary:      Effort: No respiratory distress.      Breath sounds: No rales.   Abdominal:      General: Bowel sounds are normal. There is no distension.      Tenderness: There is no abdominal tenderness.   Musculoskeletal:      Cervical back: No rigidity.      Right lower leg: No edema.      Left lower leg: No edema.   Skin:     General: Skin is warm.      Capillary Refill: Capillary refill takes less than 2 seconds.   Neurological:      General: No focal deficit present.      Mental Status: He is alert and oriented to person, place, and time.      Sensory: No sensory deficit.      Comments: Strength 5/5 all 4 extremities except 3/5 in left lower extremity   Psychiatric:         Mood and Affect: Mood normal.         Behavior: Behavior normal.            Significant Labs: EP:   Recent Labs   Lab 07/21/25  1547   *   K 3.2*      CO2 26   GLU 81   BUN 12   CREATININE 3.2*   CALCIUM 7.7*   PROT 6.1   ALBUMIN 2.2*   BILITOT 0.6   ALKPHOS 119   *   ALT 80*   ANIONGAP 7*   WBC 4.21   HGB 10.6*   HCT 33.3*   *   INR 2.0*   PROTIME 21.9*   , ABG: No results for input(s): "PH", "PCO2", "HCO3", "POCSATURATED", "BE" in the last 48 hours., Blood Culture: No results for input(s): "LABBLOO" in the last 48 hours., BMP:   Recent Labs   Lab 07/21/25  1547   GLU 81   *   K 3.2*      CO2 26   BUN 12   CREATININE 3.2*   CALCIUM 7.7*   , CMP:   Recent Labs   Lab " "07/21/25  1547   *   K 3.2*      CO2 26   GLU 81   BUN 12   CREATININE 3.2*   CALCIUM 7.7*   PROT 6.1   ALBUMIN 2.2*   BILITOT 0.6   ALKPHOS 119   *   ALT 80*   ANIONGAP 7*   , CBC:   Recent Labs   Lab 07/21/25  1547   WBC 4.21   HGB 10.6*   HCT 33.3*   *   , INR:   Recent Labs   Lab 07/21/25  1547   INR 2.0*   PROTIME 21.9*   , Lipid Panel No results for input(s): "CHOL", "HDL", "LDLCALC", "TRIG", "CHOLHDL" in the last 48 hours., Troponin   Recent Labs   Lab 07/21/25  1547   TROPONINI 0.106*   , and All pertinent lab results from the last 24 hours have been reviewed.    Significant Imaging: Echocardiogram: 2D echo with color flow doppler: No results found. However, due to the size of the patient record, not all encounters were searched. Please check Results Review for a complete set of results. and Transthoracic echo (TTE) complete (Cupid Only):   Results for orders placed or performed during the hospital encounter of 04/02/25   Echo   Result Value Ref Range    LV Diastolic Volume 118 mL    Echo EF Estimated 8 %    LV Systolic Volume 108 mL    IVS 0.9 0.6 - 1.1 cm    LVIDd 5.0 3.5 - 6.0 cm    LVIDs 4.8 (A) 2.1 - 4.0 cm    LVOT diameter 2.1 cm    PW 0.9 0.6 - 1.1 cm    RV S' 6.15 cm/s    LA size 3.9 cm    Left Atrium Major Axis 5.3 cm    Left Atrium Minor Axis 4.3 cm    LA Vol (MOD) 59 mL    MV Peak A Zeferino 1.01 m/s    E wave deceleration time 263 ms    MV Peak E Zeferino 0.70 m/s    E/A ratio 0.69     LV LATERAL E/E' RATIO 14.0     LV SEPTAL E/E' RATIO 17.5     TDI LATERAL 0.05 m/s    TDI SEPTAL 0.04 m/s    TV peak gradient 20 mmHg    TR Max Zeferino 2.2 m/s    Ascending aorta 3.07 cm    STJ 2.83 cm    Sinus 3.27 cm    LA WIDTH 3.6 cm    TAPSE 1.23 cm    BSA 2.04 m2    LV Systolic Volume Index 52.7 mL/m2    LV Diastolic Volume Index 57.56 mL/m2    LVOT area 3.5 cm2    FS 4.0 (A) 28 - 44 %    Left Ventricle Relative Wall Thickness 0.36 cm    LV mass 158.2 g    LV Mass Index 77.2 g/m2    E/E' ratio 16 " m/s    KAVON 28 mL/m2    LA Vol 57 cm3    KAVON (MOD) 29 mL/m2    Mean e' 0.05 m/s    ZLVIDS 1.92     ZLVIDD -2.07     TV resting pulmonary artery pressure 27 mmHg    RV TB RVSP 10 mmHg    Est. RA pres 8 mmHg    IVC diameter 2.4 cm    LVAD  Rate 5,000 rpm    Narrative      LVAD: There is a Heartmate III LVAD running at 5,000 RPM.    Left Ventricle: The left ventricle is normal in size. Normal wall   thickness. Global hypokinesis present. There is severely reduced systolic   function with a visually estimated ejection fraction of 10 -15%. Unable to   assess diastolic function due to LVAD.    Right Ventricle: The right ventricle is normal in size. Right ventricle   wall motion has global hypokinesis. Systolic function is mildly reduced.   Pacemaker lead present in the ventricle.    Left Atrium: Left atrium is dilated.    Aortic Valve: The aortic valve repaired.    Mitral Valve: The mitral valve is repaired with an Noble stitch.    Pulmonary Artery: The estimated pulmonary artery systolic pressure is   27 mmHg.    IVC/SVC: Intermediate venous pressure at 8 mmHg.       CT head 7/21/25  No acute intracranial abnormalities   Assessment and Plan:     * Weakness  CT head 07/21/2025: Negative for any acute intracranial abnormality  Continue Coumadin  PT/OT  Palliative medicine consult  GOC discussion    Chronic combined systolic and diastolic CHF, NYHA class 4  - Continue hydralazine 75mg every 8 hours  - no ACE inhibitor ARB,ARNI, MRA because of low blood pressure  - No beta blocker because of RV dysfunction    ESRD (end stage renal disease)  Monitor UOP and serial BMP and adjust therapy as needed. Renally dose meds. Avoid nephrotoxic medications and procedures. Nephrology consulted for dialysis     LVAD (left ventricular assist device) present  Continue device interrogation   continue the same settings  No low-flow alarms    PAF (paroxysmal atrial fibrillation)  Continue amiodarone 400 once daily  On coumadin    Type 2  diabetes mellitus without complication, without long-term current use of insulin  Insulin sliding scale  Hypoglycemia protocol        Dougie Pandey MD  Transplant Heart  [facility] - Cardiology Stepdown

## 2025-07-22 NOTE — PT/OT/SLP EVAL
Occupational Therapy   Evaluation    Name: Radha Abbott  MRN: [id number]  Admitting Diagnosis: Failure to thrive in adult  Recent Surgery: * No surgery found *      Recommendations:     Discharge Recommendations: Moderate Intensity Therapy  Discharge Equipment Recommendations:   (adjustable bed)  Barriers to discharge:   (increased skilled A needed)    Assessment:     Radha Abbott is a 65 y.o. male with a medical diagnosis of Failure to thrive in adult.  He presents with performance deficits affecting function: weakness, impaired endurance, impaired self care skills, impaired functional mobility, gait instability, impaired balance, decreased lower extremity function, decreased upper extremity function, decreased coordination, pain, impaired fine motor, impaired coordination, impaired cardiopulmonary response to activity, impaired joint extensibility.     Pt engaged fairly in therapy this date, limited by fatigue and decrease desire to participate in evaluative session requiring increased encouragement and education from writing OT and pt's wife. Wife reports she assisted Mr. Abbott to upright chair with significant assistance where he sat ~3 hours, and possibly is fatigued from this, however pt did present with some withdrawing behaviors (Ie: attempting to lay back down before strength testing and then closing his eyes, with wife intervening to promote participation). Pt agreeable to sitting EOB and required significant assistance to sit EOB with 1 LOB to L side during strength testing requiring max A to assist pt to sitting upright. Pt required significant assistance to maintain upright seating EOB 2* weak trunk control, and after strength testing completed pt began to lower himself back to supine position.  Pt assisted to supine position at [facility] with wife present.  Patient currently demonstrates a need for moderate intensity therapy on a daily basis post acute secondary to a decline in functional status due to illness.  "      Rehab Prognosis: Good; patient would benefit from acute skilled OT services to address these deficits and reach maximum level of function.       Plan:     Patient to be seen 4 x/week to address the above listed problems via self-care/home management, therapeutic activities, therapeutic exercises  Plan of Care Expires: 08/12/25  Plan of Care Reviewed with: patient, spouse    Subjective     Chief Complaint: "I'm tired"   Patient/Family Comments/goals: Get better, return home     Occupational Profile:  Living Environment: Pt lives with wife in [facility], small step, WIS + bb + gb  Previous level of function: significant assistance with mobility (stand pivot transfers from wife), sponge baths, assistance with toileting and dressing   Roles and Routines:    Equipment Used at Home: bath bench, grab bar, bedside commode, walker, rolling, rollator  Assistance upon Discharge: wife    Pain/Comfort:  Pain Rating 1: 0/10  Pain Rating Post-Intervention 1: 0/10    Patients cultural, spiritual, [employer] conflicts given the current situation: no    Objective:     Communicated with: RN prior to session.  Patient found HOB elevated with LVAD upon OT entry to room.    General Precautions: Standard, fall  Orthopedic Precautions: N/A  Braces: N/A  Respiratory Status: Room air    Occupational Performance:    Bed Mobility:    Patient completed Rolling/Turning to Left with  minimum assistance  Patient completed Rolling/Turning to Right with minimum assistance  Patient completed Scooting/Bridging:   To EOB while sitting upright with maximal assistance  To HOB while supine with total A of 2 persons  Patient completed Supine to Sit with maximal assistance  Patient completed Sit to Supine with maximal assistance with leg lift  Pt able to sit EOB ~8min for strength testing with CGA to max A for LOB to L side, returning to min A with L lean    Functional Mobility/Transfers:  Pt deferred this date    Activities of Daily Living:  Upper " Body Dressing: moderate assistance managing hospital gown to maximize coverage   Lower Body Dressing: total assistance adjusting bilateral nonslip socks to maximize fit     Cognitive/Visual Perceptual:  Cognitive/Psychosocial Skills:     -       Oriented to: AOX4   -       Follows Commands/attention:Follows multistep  commands  -       Communication: clear/fluent  -       Memory: No Deficits noted  -       Safety awareness/insight to disability: impaired   -       Mood/Affect/Coping skills/emotional control: Pleasant and Withdrawn  Visual/Perceptual:      -Intact      Physical Exam:  Balance:    -       Sitting: Impaired  Upper Extremity Range of Motion:     -       Right Upper Extremity: WFL  -       Left Upper Extremity: WFL  Upper Extremity Strength:    -       Right Upper Extremity: WFL  -       Left Upper Extremity: Deficits: 3+/5   Strength:    -       Right Upper Extremity: WFL  -       Left Upper Extremity: WFL  Fine Motor Coordination:    -       Intact  Neurological:    -       impaired    AMPAC 6 Click ADL:  AMPAC Total Score: 14    Treatment & Education:  Pt educated on role of OT, POC, and goals for therapy.    POC was dicussed with patient/caregiver, who was included in its development and is in agreement with the identified goals and treatment plan.   Patient and family aware of patient's deficits and therapy progression.   Time provided for therapeutic counseling and discussion of health disposition.   Educated on importance of EOB/OOB mobility, maintaining routine, sitting up in chair, and maximizing independence with ADLs during admission   Pt completed ADLs and functional mobility for treatment session as noted above   Pt/caregiver verbalized understanding and expressed no further concerns/questions.  Updated communication board with level of assist required      Patient left HOB elevated with all lines intact, call button in reach, RN notified, and wife present    GOALS:   Multidisciplinary  Problems       Occupational Therapy Goals          Problem: Occupational Therapy    Goal Priority Disciplines Outcome Interventions   Occupational Therapy Goal     OT, PT/OT Progressing    Description: Goals to be met by: 8/12/25     Patient will increase functional independence with ADLs by performing:    UE Dressing with Minimal Assistance.  LE Dressing with Moderate Assistance.  Grooming while bedside chair with Contact Guard Assistance.  Toileting from toilet with Maximum Assistance for hygiene and clothing management.   Supine to sit with Minimal Assistance.  Step transfer with Minimal Assistance  Toilet transfer to toilet with Minimal Assistance.                         DME Justifications:  Radha requires a hospital bed due to him requiring positioning of the body in ways not feasible with an ordinary bed due to limited ability and cannot independently make changes in body position without the use of the bed.The positioning of the body cannot be sufficiently resolved by the use of pillows and wedges.    History:     Past Medical History:   Diagnosis Date    Aspiration pneumonia of both lungs 06/17/2024    CAD (coronary artery disease)     CHF (congestive heart failure)     Delirium 06/16/2024    Diabetes mellitus     HFrEF (heart failure with reduced ejection fraction)     Hypoglycemia 06/12/2024    ICD (implantable cardioverter-defibrillator) in place     LVAD (left ventricular assist device) present 12/01/2023    MI, old     PAF (paroxysmal atrial fibrillation) 10/11/2023    Stage 4 chronic kidney disease 02/15/2024    Type 2 diabetes mellitus without complication, without long-term current use of insulin 10/07/2023         Past Surgical History:   Procedure Laterality Date    ANGIOPLASTY-VENOUS ARTERY Right 12/1/2023    Procedure: ANGIOPLASTY-VENOUS ARTERY, RIGHT FEMORAL;  Surgeon: Yuri Washington MD;  Location: [facility] OR [address] [place] [address];  Service: Cardiovascular;  Laterality: Right;    AORTIC VALVULOPLASTY N/A  12/1/2023    Procedure: REPAIR, AORTIC VALVE;  Surgeon: Yuri Washington MD;  Location: [facility] OR 2ND FLR;  Service: Cardiovascular;  Laterality: N/A;    CARDIAC SURGERY      CLOSURE N/A 12/1/2023    Procedure: CLOSURE, TEMPORARY;  Surgeon: Yuri Washington MD;  Location: [facility] OR 2ND FLR;  Service: Cardiovascular;  Laterality: N/A;    DRAINAGE OF PLEURAL EFFUSION  12/4/2023    Procedure: DRAINAGE, PLEURAL EFFUSION;  Surgeon: Yuri Washington MD;  Location: [facility] OR [facility] FLR;  Service: Cardiovascular;;    INSERTION OF GRAFT TO PERICARDIUM  12/4/2023    Procedure: INSERTION, GRAFT, PERICARDIUM;  Surgeon: Yuri Washington MD;  Location: [facility] OR [facility] FLR;  Service: Cardiovascular;;    INSERTION OF INTRA-AORTIC BALLOON ASSIST DEVICE Right 11/21/2023    Procedure: INSERTION, INTRA-AORTIC BALLOON PUMP;  Surgeon: Finn Cohn MD;  Location: [facility] CATH LAB;  Service: Cardiology;  Laterality: Right;    LEFT VENTRICULAR ASSIST DEVICE Left 12/1/2023    Procedure: INSERTION-LEFT VENTRICULAR ASSIST DEVICE;  Surgeon: Yuri Washington MD;  [facility];  Service: Cardiovascular;  Laterality: Left;  REDO STERNOTOMY - REDO SAW NEEDED FOR CASE    LYSIS OF ADHESIONS  12/1/2023    Procedure: LYSIS, ADHESIONS;  Surgeon: Yuri Washington MD;  [facility];  Service: Cardiovascular;;    PLACEMENT OF SWAN ROLANDO CATHETER WITH IMAGING GUIDANCE  11/20/2023    Procedure: INSERTION, CATHETER, SWAN-ROLANDO, WITH IMAGING GUIDANCE;  Surgeon: Sajan Hurley MD;  Location: [facility] CATH LAB;  Service: Cardiology;;    REMOVAL OF TUNNELED CENTRAL VENOUS CATHETER (CVC) N/A 3/1/2024    Procedure: REMOVAL, CATHETER, CENTRAL VENOUS, TUNNELED;  Surgeon: Seble Aguilar MD;  Location: [facility] CATH LAB;  Service: Interventional Nephrology;  Laterality: N/A;    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 12/1/2023    Procedure: REPAIR, ANEURYSM, ARTERY, FEMORAL;  Surgeon: Yuri Washington MD;  Location: [facility] OR [facility] FLR;  Service:  Cardiovascular;  Laterality: Right;  Right Femoral Artery Repair    RIGHT HEART CATHETERIZATION Right 10/10/2023    Procedure: INSERTION, CATHETER, RIGHT HEART;  Surgeon: Bin Gandhi MD;  Location: [facility] CATH LAB;  Service: Cardiology;  Laterality: Right;    RIGHT HEART CATHETERIZATION Right 10/13/2023    Procedure: INSERTION, CATHETER, RIGHT HEART;  Surgeon: Walter Mcintyre MD;  Location: [facility] CATH LAB;  Service: Cardiology;  Laterality: Right;    RIGHT HEART CATHETERIZATION  11/13/2023    RIGHT HEART CATHETERIZATION Right 11/13/2023    Procedure: INSERTION, CATHETER, RIGHT HEART;  Surgeon: Juventino Bermudez Jr., MD;  Location: [facility] CATH LAB;  Service: Cardiology;  Laterality: Right;    RIGHT HEART CATHETERIZATION Right 11/20/2023    Procedure: INSERTION, CATHETER, RIGHT HEART;  Surgeon: Sajan Hurley MD;  Location: [facility] CATH LAB;  Service: Cardiology;  Laterality: Right;    RIGHT HEART CATHETERIZATION Right 1/22/2024    Procedure: INSERTION, CATHETER, RIGHT HEART;  Surgeon: Brayan Ocampo MD;  Location: [facility] CATH LAB;  Service: Cardiology;  Laterality: Right;    STERNAL WOUND CLOSURE N/A 12/4/2023    Procedure: CLOSURE, WOUND, STERNUM;  Surgeon: Yuri Washington MD;  [facility];  Service: Cardiovascular;  Laterality: N/A;    STERNOTOMY N/A 12/1/2023    Procedure: STERNOTOMY, REDO;  Surgeon: Yuri Washington MD;  Location: [facility] OR [facility] FLR;  Service: Cardiovascular;  Laterality: N/A;    VALVULOPLASTY, MITRAL VALVE N/A 12/1/2023    Procedure: VALVULOPLASTY, MITRAL VALVE;  Surgeon: Yuri Washington MD;  Location: [facility] OR [facility] FLR;  Service: Cardiovascular;  Laterality: N/A;       Time Tracking:     OT Date of Treatment: 07/22/25  OT Start Time: 1538  OT Stop Time: 1554  OT Total Time (min): 16 min    Billable Minutes:Evaluation 8  Therapeutic Activity 8    7/22/2025

## 2025-07-22 NOTE — SUBJECTIVE & OBJECTIVE
Past Medical History:   Diagnosis Date    Aspiration pneumonia of both lungs 06/17/2024    CAD (coronary artery disease)     CHF (congestive heart failure)     Delirium 06/16/2024    Diabetes mellitus     HFrEF (heart failure with reduced ejection fraction)     Hypoglycemia 06/12/2024    ICD (implantable cardioverter-defibrillator) in place     LVAD (left ventricular assist device) present 12/01/2023    MI, old     PAF (paroxysmal atrial fibrillation) 10/11/2023    Stage 4 chronic kidney disease 02/15/2024    Type 2 diabetes mellitus without complication, without long-term current use of insulin 10/07/2023       Past Surgical History:   Procedure Laterality Date    ANGIOPLASTY-VENOUS ARTERY Right 12/1/2023    Procedure: ANGIOPLASTY-VENOUS ARTERY, RIGHT FEMORAL;  Surgeon: Yuri Washington MD;  [facility];  Service: Cardiovascular;  Laterality: Right;    AORTIC VALVULOPLASTY N/A 12/1/2023    Procedure: REPAIR, AORTIC VALVE;  Surgeon: Yuri Washington MD;  [facility];  Service: Cardiovascular;  Laterality: N/A;    CARDIAC SURGERY      CLOSURE N/A 12/1/2023    Procedure: CLOSURE, TEMPORARY;  Surgeon: Yuri Washington MD;  Location: [facility] OR [facility] FLR;  Service: Cardiovascular;  Laterality: N/A;    DRAINAGE OF PLEURAL EFFUSION  12/4/2023    Procedure: DRAINAGE, PLEURAL EFFUSION;  Surgeon: Yuri Washington MD;  Location: [facility] OR [facility] FLR;  Service: Cardiovascular;;    INSERTION OF GRAFT TO PERICARDIUM  12/4/2023    Procedure: INSERTION, GRAFT, PERICARDIUM;  Surgeon: Yuri Washington MD;  [facility];  Service: Cardiovascular;;    INSERTION OF INTRA-AORTIC BALLOON ASSIST DEVICE Right 11/21/2023    Procedure: INSERTION, INTRA-AORTIC BALLOON PUMP;  Surgeon: Finn Cohn MD;  Location: [facility] CATH LAB;  Service: Cardiology;  Laterality: Right;    LEFT VENTRICULAR ASSIST DEVICE Left 12/1/2023    Procedure: INSERTION-LEFT VENTRICULAR ASSIST DEVICE;  Surgeon: Yuri Washington MD;   Location: [address] FLR;  Service: Cardiovascular;  Laterality: Left;  REDO STERNOTOMY - REDO SAW NEEDED FOR CASE    LYSIS OF ADHESIONS  12/1/2023    Procedure: LYSIS, ADHESIONS;  Surgeon: Yuri Washington MD;  [address]  Service: Cardiovascular;;    PLACEMENT OF SWAN ROLANDO CATHETER WITH IMAGING GUIDANCE  11/20/2023    Procedure: INSERTION, CATHETER, SWAN-ROLANDO, WITH IMAGING GUIDANCE;  Surgeon: Sajan Hulrey MD;  Location: [facility] CATH LAB;  Service: Cardiology;;    REMOVAL OF TUNNELED CENTRAL VENOUS CATHETER (CVC) N/A 3/1/2024    Procedure: REMOVAL, CATHETER, CENTRAL VENOUS, TUNNELED;  Surgeon: Seble Aguilar MD;  Location: [facility] CATH LAB;  Service: Interventional Nephrology;  Laterality: N/A;    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 12/1/2023    Procedure: REPAIR, ANEURYSM, ARTERY, FEMORAL;  Surgeon: Yuri Washington MD;  [address]  Service: Cardiovascular;  Laterality: Right;  Right Femoral Artery Repair    RIGHT HEART CATHETERIZATION Right 10/10/2023    Procedure: INSERTION, CATHETER, RIGHT HEART;  Surgeon: Bin Gandhi MD;  Location: [facility] CATH LAB;  Service: Cardiology;  Laterality: Right;    RIGHT HEART CATHETERIZATION Right 10/13/2023    Procedure: INSERTION, CATHETER, RIGHT HEART;  Surgeon: Walter Mcintyre MD;  Location: [facility] CATH LAB;  Service: Cardiology;  Laterality: Right;    RIGHT HEART CATHETERIZATION  11/13/2023    RIGHT HEART CATHETERIZATION Right 11/13/2023    Procedure: INSERTION, CATHETER, RIGHT HEART;  Surgeon: Juventino Bermudez Jr., MD;  Location: [facility] CATH LAB;  Service: Cardiology;  Laterality: Right;    RIGHT HEART CATHETERIZATION Right 11/20/2023    Procedure: INSERTION, CATHETER, RIGHT HEART;  Surgeon: Sajan Hurley MD;  Location: [facility] CATH LAB;  Service: Cardiology;  Laterality: Right;    RIGHT HEART CATHETERIZATION Right 1/22/2024    Procedure: INSERTION, CATHETER, RIGHT HEART;  Surgeon: Brayan Ocampo MD;  Location: [facility]  LAB;  Service: Cardiology;  Laterality: Right;    STERNAL WOUND CLOSURE N/A 12/4/2023    Procedure: CLOSURE, WOUND, STERNUM;  Surgeon: Yuri Washington MD;  [facility];  Service: Cardiovascular;  Laterality: N/A;    STERNOTOMY N/A 12/1/2023    Procedure: STERNOTOMY, REDO;  Surgeon: Yuri Washington MD;  [facility];  Service: Cardiovascular;  Laterality: N/A;    VALVULOPLASTY, MITRAL VALVE N/A 12/1/2023    Procedure: VALVULOPLASTY, MITRAL VALVE;  Surgeon: Yuri Washington MD;  [facility];  Service: Cardiovascular;  Laterality: N/A;       Review of patient's allergies indicates:  No Known Allergies  Current Facility-Administered Medications   Medication Frequency    amiodarone tablet 400 mg Daily    cholestyramine 4 gram packet 4 g BID    dextrose 50% injection 12.5 g PRN    dextrose 50% injection 25 g PRN    diphenoxylate-atropine 2.5-0.025 mg per tablet 2 tablet QID PRN    droNABinol capsule 2.5 mg BID    glucagon (human recombinant) injection 1 mg PRN    glucose chewable tablet 16 g PRN    glucose chewable tablet 24 g PRN    hydrALAZINE tablet 75 mg Q8H    insulin aspart U-100 pen 0-5 Units QID (AC + HS) PRN    Lactobacillus rhamnosus GG capsule 1 capsule Daily    mupirocin 2 % ointment BID    venlafaxine tablet 37.5 mg Daily    warfarin (COUMADIN) tablet 1 mg Once per day on Sunday Tuesday Thursday Saturday    And    [START ON 7/23/2025] warfarin (COUMADIN) split tablet 0.5 mg Once per day on Monday Wednesday Friday     Family History    None       Tobacco Use    Smoking status: Former     Current packs/day: 0.50     Types: Cigarettes    Smokeless tobacco: Not on file   Vaping Use    Vaping status: Never Used   Substance and Sexual Activity    Alcohol use: Yes     Comment: rarely    Drug use: No    Sexual activity: Not Currently     Partners: Female     Review of Systems   Constitutional:  Positive for activity change and appetite change.   Eyes:  Negative for visual  disturbance.   Respiratory:  Positive for shortness of breath.    Cardiovascular:  Positive for leg swelling.   Gastrointestinal:  Positive for diarrhea.   Genitourinary:  Positive for decreased urine volume.   Skin:  Negative for wound.   Neurological:  Positive for weakness.   Psychiatric/Behavioral:  Negative for agitation, behavioral problems and confusion.      Objective:     Vital Signs (Most Recent):  Temp: 98 °F (36.7 °C) (07/22/25 1145)  Pulse: 89 (07/22/25 1145)  Resp: 18 (07/22/25 1145)  BP: 100/78 (07/22/25 0810)  SpO2: 96 % (07/22/25 1233) Vital Signs (24h Range):  Temp:  [97.9 °F (36.6 °C)-99.4 °F (37.4 °C)] 98 °F (36.7 °C)  Pulse:  [80-92] 89  Resp:  [15-18] 18  SpO2:  [96 %-100 %] 96 %  BP: ()/(0-91) 100/78     Weight: 76.4 kg (168 lb 6.9 oz) (07/22/25 0517)  Body mass index is 22.22 kg/m².  Body surface area is 1.98 meters squared.    No intake/output data recorded.     Physical Exam  Vitals and nursing note reviewed.   Constitutional:       General: He is not in acute distress.  Cardiovascular:      Rate and Rhythm: Normal rate.      Comments: LVAD hum  Pulmonary:      Effort: Pulmonary effort is normal.   Abdominal:      Tenderness: There is no abdominal tenderness.   Musculoskeletal:         General: Normal range of motion.      Cervical back: Normal range of motion.   Neurological:      Mental Status: He is alert and oriented to person, place, and time.   Psychiatric:         Mood and Affect: Mood normal.         Behavior: Behavior normal.          Significant Labs:  CBC:   Recent Labs   Lab 07/22/25  0339   WBC 4.45   RBC 3.36*   HGB 9.7*   HCT 30.5*   *   MCV 91   MCH 28.9   MCHC 31.8*     CMP:   Recent Labs   Lab 07/22/25  0339   GLU 72   CALCIUM 7.3*   ALBUMIN 2.0*   PROT 5.3*   *   K 3.3*   CO2 23      BUN 18   CREATININE 3.7*   ALKPHOS 114   ALT 69*   AST 98*   BILITOT 0.5     All labs within the past 24 hours have been reviewed.

## 2025-07-22 NOTE — PLAN OF CARE
Advance Care Planning   [facility] - Cardiology Stepdown  Palliative Care   Psychosocial Assessment    Patient Name: Radha Abbott  MRN: [id number]  Admission Date: 7/21/2025  Hospital Length of Stay: 0 days  Code Status: Full Code   Attending Provider: Walter Mcintyre MD  Palliative Care Provider:   Primary Care Physician: Vasu Kong MD  Principal Problem:Weakness    Reason for Referral: assistance with clarification of goals of care  Consult Order Date:   Primary CM/SW:    Present during Interview: patient, spouse/SO, past medical records, and ER records.      Primary Language:English   Needed: no      Past Medical Situation:   PMH:   Past Medical History:   Diagnosis Date    Aspiration pneumonia of both lungs 06/17/2024    CAD (coronary artery disease)     CHF (congestive heart failure)     Delirium 06/16/2024    Diabetes mellitus     HFrEF (heart failure with reduced ejection fraction)     Hypoglycemia 06/12/2024    ICD (implantable cardioverter-defibrillator) in place     LVAD (left ventricular assist device) present 12/01/2023    MI, old     PAF (paroxysmal atrial fibrillation) 10/11/2023    Stage 4 chronic kidney disease 02/15/2024    Type 2 diabetes mellitus without complication, without long-term current use of insulin 10/07/2023     Mental Health/Substance Use History: none disclosed   Risk of Abuse, neglect or exploitation: none identified   Current or Previous Trauma and/or evidence of PTSD: none disclosed   Non-traditional Health practices: none disclosed     Understanding of diagnosis and prognosis: moderate   Experience/Comfort level with health care system: no issues identified     Patients Mental Status: AAOx3     Socio-Economic Factors/Resources:  Address: [address] [place] [address]  Phone Number: [phone] (home)     Marital Status:   Household composition: pt lives with wife, Arlyn  Children: yes    Patient/Family perceptions about Caregiving  Needs; availability and capacity: pt/wife aware of increased cg needs.    Family Structure, Dynamics/Relationships: pt has a good relationship with family.     Patterns/Styles of Communication and Decision-making in the Family: pt is decisional     Patient/Family Coping: appropriate     Activities of Daily Living: needs assistance   Support Systems-Family & Community (Home Health, HME etc): TBD    Transportation:  yes    Work/Education History: retired OTR and    Self-Care Activities/Hobbies: spending time with grandchildren, motorcycles     History: no    Financial Resources:Medicaid      Advanced Care Planning & Legal Concerns:   Advanced Directives/Living Will: no  LaPOST/POLST: no   Planning:  no    Medical Power of : yes     Oral/Written Declaration: yes  Witnesses:   Surrogate Decision Maker:     Emergency Contacts:    Spirituality, Culture & Coping Mechanisms:  F- Mavis and Belief: Tenriism     I - Importance: yes    C - Community/Culture Values: Anglican, prayer. Pt's brother in law is family's . They attend [employer] weekly.      A - Address in Care: yes      Goals/Hopes/Expectations: to keep living   Fears/Anxiety/Concerns: further decline/decompensation         Preferences about EOL Environment: (own bed, family nearby, pets, music, etc): not discussed today      Complicated Bereavement Risk Assessment Tool (CBRAT)  Reference:  [facility] Clinical Practice Group (May 2016). Bereavement Risk Screening and Management Guidelines.  Retrieved from: http://www.grpcc.com.au/wp-content/uploads//IBMDB-Ecqnweynegj-Rwfpywswg-and-Management-Guideline-2016.pdf      Bereaved Client Characteristics   Under 18      no  Was a Twin   no  Young Spouse   no  Elderly Spouse    no  Isolated    no  Lacks Meaningful Social Support   no  Dissatisfied with help available during illness   no  New to Financial Mobile no  New to  Decision-Making   no    Illness  Inherited Disorder   no  Stigmatized Disease in the family/community   no  Lengthy/Burdensome   yes     Bereaved Client's History of Loss   Cumulative Multiple Losses   no  Previous Mental Health Illnesses   no  Current Mental Health Illness   no  Other Significant Health Issues   no   Migrant/Refugee   no Will the Death be:  Sudden or Unexpected   no  Traumatic Circumstances Associated with Death   no  Significant Cultural/Social Burdens as a result of Death   no   Relationship with   Profound Lifelong Partner   yes  Highly Dependent    no  Antagonistic   no  Ambivalent   no  Deeply Connected   yes  Culturally Defined   yes   Risk Factors Scores  0-2  Low  3-5  Moderate  5+  High  All persons scoring moderate to high presume to be at risk**    (** It is acknowledged that protective factors and resilience may outweigh apparent risk factors.      Total Risk Factors Score:   moderate     Advance Care Planning     Date: 2025    [facility]  I engaged the patient and family in a voluntary conversation about advance care planning and we specifically addressed what the goals of care would be moving forward, in light of the patient's change in clinical status, specifically failure to thrive s/t heart failure with VAD.  We did specifically address the patient's likely prognosis, which is poor.  We explored the patient's values and preferences for future care.  The patient and family endorses that what is most important right now is to focus on spending time at home, remaining as independent as possible, symptom/pain control, and extending life as long as possible, even it it means sacrificing quality    Accordingly, we have decided that the best plan to meet the patient's goals includes continuing with treatment    A total of 30 min was spent on advance care planning, goals of care discussion, emotional support, formulating and communicating prognosis and exploring  "burden/benefit of various approaches of treatment. This discussion occurred on a fully voluntary basis with the verbal consent of the patient and/or family.         Discharge Planning Needs/Plan of Care:     BASIM, along with CAROLINA , met with pt and wife at bedside. Pt AAOx3, with flat affect. Pt/wife confirm that pt has been getting weaker, having falls, has had a decreased appetite, significant weight loss, and repeat hospitalizations over the last few months. Discussed hospice briefly, when pt shared that his quality of life is suffering, and that he feels like he's living in a body that is not his own. However, while pt recognizes that he is declining, he wants to "continue living," keep going to outpt dialysis, and maintain the ability to go back and forth to the hospital and outpt clinic appointments. Pt amenable to outpt pal med follow up to continue GOC conversations, as we worry that he will continue to decline. Wife requests appointments between 7a-9a on Tu/Thurs due to her work schedule. No other immediate needs identified at this time.     Radha Whitman, BASIM-BACS, [facility]  Department of Palliative Medicine                     "

## 2025-07-22 NOTE — NURSING
Nurses Note -- 4 Eyes      7/22/2025   1:19 AM      Skin assessed during: Admit      [] No Altered Skin Integrity Present    []Prevention Measures Documented      [x] Yes- Altered Skin Integrity Present or Discovered   [x] LDA Added if Not in Epic (Describe Wound)   [] New Altered Skin Integrity was Present on Admit and Documented in LDA   [x] Wound Image Taken  [] Already in LDA    Wound Care Consulted? No    Attending Nurse:  Mile Joseph RN     Second RN/Staff Member:  Erasto CARD

## 2025-07-22 NOTE — ASSESSMENT & PLAN NOTE
-On HD (M, W, F).  Kurt cath, right subclavian artery  -Nephrology consulted for renal function management

## 2025-07-22 NOTE — CARE UPDATE
Unit [facility] Care Support Interaction      I have reviewed the chart of Radha Abbott who is hospitalized for Weakness. The patient is currently located in the following unit: CSU        I have assisted the primary physician in management of the following:      Central Line - Present on admission to the unit - ordered blood cultures  MRSA Decolonization - Mupirocin ordered and CHG ordered        Vero Lozada PA-C  Unit Based [facility]

## 2025-07-22 NOTE — SUBJECTIVE & OBJECTIVE
Interval History:  No acute events noted overnight.  Patient denies CP, SOB, dizziness, lightheadedness, palpitations.  Patient presented yesterday to the ED for continued bilateral leg weakness, and diarrhea.  Patient has been hospitalized approximately 4 times since beginning of May for diarrhea, BLE weakness.  Patient did have a fall 1 week ago with speech disturbance in his right foot dragging, but CT head yesterday revealed no acute infarct, but chronic mastoiditis.  Palliative care consulted for goals of care discussion.    Continuous Infusions:  Scheduled Meds:   amiodarone  400 mg Oral Daily    cholestyramine  1 packet Oral BID    droNABinol  2.5 mg Oral BID    hydrALAZINE  75 mg Oral Q8H    Lactobacillus rhamnosus GG  1 capsule Oral Daily    mupirocin   Nasal BID    venlafaxine  37.5 mg Oral Daily    warfarin  1 mg Oral Once per day on Sunday Tuesday Thursday Saturday    And    [START ON 7/23/2025] warfarin  0.5 mg Oral Once per day on Monday Wednesday Friday     PRN Meds:  Current Facility-Administered Medications:     dextrose 50%, 12.5 g, Intravenous, PRN    dextrose 50%, 25 g, Intravenous, PRN    diphenoxylate-atropine 2.5-0.025 mg, 2 tablet, Oral, QID PRN    glucagon (human recombinant), 1 mg, Intramuscular, PRN    glucose, 16 g, Oral, PRN    glucose, 24 g, Oral, PRN    insulin aspart U-100, 0-5 Units, Subcutaneous, QID (AC + HS) PRN    Review of patient's allergies indicates:  No Known Allergies  Objective:     Vital Signs (Most Recent):  Temp: 98 °F (36.7 °C) (07/22/25 1145)  Pulse: 89 (07/22/25 1145)  Resp: 18 (07/22/25 1145)  BP: 100/78 (07/22/25 0810)  SpO2: 99 % (07/22/25 1145) Vital Signs (24h Range):  Temp:  [97.9 °F (36.6 °C)-99.4 °F (37.4 °C)] 98 °F (36.7 °C)  Pulse:  [80-92] 89  Resp:  [15-18] 18  SpO2:  [97 %-100 %] 99 %  BP: ()/(0-91) 100/78     Patient Vitals for the past 72 hrs (Last 3 readings):   Weight   07/22/25 0517 76.4 kg (168 lb 6.9 oz)     Body mass index is 22.22  "kg/m².    No intake or output data in the 24 hours ending 07/22/25 1157    Hemodynamic Parameters:            Physical Exam  HENT:      Mouth/Throat:      Mouth: Mucous membranes are moist.   Eyes:      Pupils: Pupils are equal, round, and reactive to light.   Cardiovascular:      Rate and Rhythm: Normal rate and regular rhythm.      Heart sounds: Heart sounds not distant. No murmur heard.     No friction rub. No gallop.   Pulmonary:      Effort: Pulmonary effort is normal.      Breath sounds: Normal breath sounds.   Abdominal:      General: There is no distension.      Palpations: Abdomen is soft.      Tenderness: There is no abdominal tenderness. There is no guarding.   Musculoskeletal:      Right lower leg: No edema.      Left lower leg: No edema.   Skin:     General: Skin is warm and dry.      Capillary Refill: Capillary refill takes less than 2 seconds.   Neurological:      General: No focal deficit present.      Mental Status: He is alert and oriented to person, place, and time.   Psychiatric:         Mood and Affect: Mood normal.         Behavior: Behavior normal.            Significant Labs:  CBC:  Recent Labs   Lab 07/21/25  1547 07/22/25 0339   WBC 4.21 4.45   RBC 3.67* 3.36*   HGB 10.6* 9.7*   HCT 33.3* 30.5*   * 133*   MCV 91 91   MCH 28.9 28.9   MCHC 31.8* 31.8*     BNP:  No results for input(s): "BNP" in the last 168 hours.    Invalid input(s): "BNPTRIAGELBLO"  CMP:  Recent Labs   Lab 07/21/25  1547 07/22/25  0339   GLU 81 72   CALCIUM 7.7* 7.3*   ALBUMIN 2.2* 2.0*   PROT 6.1 5.3*   * 135*   K 3.2* 3.3*   CO2 26 23    105   BUN 12 18   CREATININE 3.2* 3.7*   ALKPHOS 119 114   ALT 80* 69*   * 98*   BILITOT 0.6 0.5      Coagulation:   Recent Labs   Lab 07/17/25  0828 07/21/25  1547 07/22/25  0339   INR 2.3* 2.0* 2.0*   APTT  --  43.0* 41.7*     LDH:  Recent Labs   Lab 07/22/25  0339   *     Microbiology:  Microbiology Results (last 7 days)       Procedure Component Value " Units Date/Time    Blood culture [[id number]] Collected: 07/22/25 1049    Order Status: Sent Specimen: Blood from Peripheral, Hand, Left     Blood culture [[id number]] Collected: 07/22/25 1049    Order Status: Sent Specimen: Blood from Peripheral, Lower Arm, Left             I have reviewed all pertinent labs within the past 24 hours.    Estimated Creatinine Clearance: 22.1 mL/min (A) (based on SCr of 3.7 mg/dL (H)).    Diagnostic Results:  I have reviewed all pertinent imaging results/findings within the past 24 hours.

## 2025-07-22 NOTE — ASSESSMENT & PLAN NOTE
65 y.o. Black or  Male ESRD-HD M-W-F presents to ED on 7/21/2025 with diagnosis of: Generalized weakness [R53.1];Failure to thrive in adult [R62.7]   Nephrology consulted for inpatient ESRD-HD management    Outpatient HD Information:  -Dialysis modality: Hemodialysis  -Outpatient HD unit: [facility]  -Nephrologist: ?  -HD TX days: Monday/Wednesday/Friday, duration of treatment: 3 hrs  -Last HD TX prior to hospital admission: 7/21/2025  -Dialysis access: dialysis catheter   -Residual urine: Minium  -EDW: 82.1 kg     Assessment:   - No emergent need for clearance and/or volume removal.   - Dialysis for metabolic clearance and volume management will be provided tomorrow AM.  - Consented for routine dialysis today.  - Will obtain OP dialysis records if here longer than 1-2 days   - Continue to monitor intake and output  - Please avoid gadolinium, fleets, phos-based laxatives, NSAIDs  - Dialysis thrice weekly unless more urgent indications arise. Will evaluate RRT requirements Daily.  - Increased mortality risk secondary to ESRD on dialysis and HFrEF stage D s/p HM 3  in setting of repeated admissions for BLE weakness, diarrhea, and decrease appetite.   - OP records reviewed; inpatient dialysis orders to be adjusted as needed per OP records.       Anemia of ESRD   Recent Labs   Lab 07/21/25  1547 07/22/25  0339   WBC 4.21 4.45   HGB 10.6* 9.7*   HCT 33.3* 30.5*   * 133*     Lab Results   Component Value Date    FESATURATED 6 (L) 06/12/2024    FERRITIN 4,766.0 (H) 05/24/2025       - Goal in ESRD is Hgb of 10-11. Hgb 9.7. Near target.   - EPO can be administered and dosed per his OP unit upon discharge.  - if patient is on iron infusions please D/C when active infection, cautiously use EPO when hx of malignancy, high BP (SBP usually > 170mmhg).    Mineral Bone Disease in ESRD   Lab Results   Component Value Date    .7 (H) 06/18/2024    CALCIUM 7.3 (L) 07/22/2025    ALBUMIN 2.0 (L) 07/22/2025     CAION 1.16 01/23/2024    PHOS 1.3 (L) 07/22/2025       - F/U PO4, Mg, Calcium. And albumin levels daily.   - Please liberalized diet and protein intake goal 1.5 g/kg/d with 1 L fluid restriction   - If patient has poor oral intake, recommend nephro nutritional shakes (ex Novasource)  - Phos low. HOLD binders.

## 2025-07-22 NOTE — ASSESSMENT & PLAN NOTE
Procedure: Device Interrogation Including analysis of device parameters  Current Settings: Ventricular Assist Device  Review of device function is stable/unstable stable        7/22/2025     8:00 AM 7/22/2025     4:20 AM 7/21/2025    11:50 PM 7/14/2025     4:00 PM 7/14/2025    11:58 AM 7/14/2025     8:00 AM 7/14/2025     6:32 AM   TXP LVAD INTERROGATIONS   Type HeartMate3 HeartMate3 HeartMate3 HeartMate3 HeartMate3 HeartMate3 HeartMate3   Flow 3.4 3.2 3 2.8 2.9 3.1 3.3   Speed 5000 5000 5000 5000 5000 5000 5000   PI 6.6 7.5 8 8.6 8.9 8.5 7.4   Power ([place]) 3.4 3.3 3.4 3.5 3.4 3.5 3.4   LSL 4600 4600 4600 4600 4600 4600 4600   Pulsatility Intermittent pulse Pulse Pulse Pulse Pulse Pulse

## 2025-07-22 NOTE — ASSESSMENT & PLAN NOTE
-Repeated admissions for BLE weakness, diarrhea.  Patient is still having issues with decrease in appetite, with appetite stimulant prescribed at home.  Patient also having continued diarrhea since being admitted for C diff infection 5/9.  Patient placed on probiotics Lomotil to help slow down diarrhea.  -Palliative care consulted for [facility] conversation  -Order Marinol 2.5 mg b.i.d. to help stimulate appetite.   -Order Questran 4 g p.o. b.i.d. to help with diarrhea.  -Order Nepro to increased caloric intake  -Consult PT/OT to evaluate patient mobility, wife requesting PT/OT at home to help rehab patient

## 2025-07-22 NOTE — PLAN OF CARE
Unable to get accurate urine output d/t pt urinated when had BM. Hemodialysis ordered for tomorrow. VSS. LVAD number and doppler WNL. LVAD dressing CDI. Spouse at bedside. BG monitored. Pt educated on fall risk and remained free from falls/trauma/injury. Denies chest pain, SOB, palpitations, dizziness, pain, or discomfort. Plan of care reviewed with pt, all questions answered. Bed locked in lowest position, call bell within reach, no acute distress noted, will continue to monitor.

## 2025-07-22 NOTE — CONSULTS
[facility] - [facility] Stepdown  Nephrology  Consult Note    Patient Name: Radha Abbott  MRN: [id number]  Admission Date: 7/21/2025  Hospital Length of Stay: 0 days  Attending Provider: Walter Mcintyre MD   Primary Care Physician: Vasu Kong MD  Principal Problem:Failure to thrive in adult    Inpatient consult to Nephrology  Consult performed by: Matilda Hinton, DNP, FNP-C  Consult ordered by: Dougie Pandey MD  Reason for consult: ESRD        Subjective:     HPI: The patient is a 65 y.o. Black or  Male with multiple co morbidities including stage D HFrEF 2/2 ICM s/p HM3 as DT (12/1/2023), ESRD on HD (MWF), and debility who presents to ED on 7/21/2025 with complaints of bilateral leg weakness. Of note, the patient has been hospitalized multiple times w similar complaints including C -difficile infection and low-flow alarms. Wife reports progressive LE weakness x 1 week and decreased appetite. Denies complaints of shortness of breath and CP. The patient is MWF dialysis patient and was last dialyzed yesterday. Nephrology consulted for management of ESRD and HD treatment.    Past Medical History:   Diagnosis Date    Aspiration pneumonia of both lungs 06/17/2024    CAD (coronary artery disease)     CHF (congestive heart failure)     Delirium 06/16/2024    Diabetes mellitus     HFrEF (heart failure with reduced ejection fraction)     Hypoglycemia 06/12/2024    ICD (implantable cardioverter-defibrillator) in place     LVAD (left ventricular assist device) present 12/01/2023    MI, old     PAF (paroxysmal atrial fibrillation) 10/11/2023    Stage 4 chronic kidney disease 02/15/2024    Type 2 diabetes mellitus without complication, without long-term current use of insulin 10/07/2023       Past Surgical History:   Procedure Laterality Date    ANGIOPLASTY-VENOUS ARTERY Right 12/1/2023    Procedure: ANGIOPLASTY-VENOUS ARTERY, RIGHT FEMORAL;  Surgeon: Yuri Washington MD;  Location: [facility] OR [address] [place] [address];   Service: Cardiovascular;  Laterality: Right;    AORTIC VALVULOPLASTY N/A 12/1/2023    Procedure: REPAIR, AORTIC VALVE;  Surgeon: Yuri Washington MD;  Location: [facility] OR 2ND FLR;  Service: Cardiovascular;  Laterality: N/A;    CARDIAC SURGERY      CLOSURE N/A 12/1/2023    Procedure: CLOSURE, TEMPORARY;  Surgeon: Yuri Washington MD;  Location: [facility] OR 2ND FLR;  Service: Cardiovascular;  Laterality: N/A;    DRAINAGE OF PLEURAL EFFUSION  12/4/2023    Procedure: DRAINAGE, PLEURAL EFFUSION;  Surgeon: Yuri Washington MD;  Location: [facility] OR 2ND FLR;  Service: Cardiovascular;;    INSERTION OF GRAFT TO PERICARDIUM  12/4/2023    Procedure: INSERTION, GRAFT, PERICARDIUM;  Surgeon: Yuri Washington MD;  Location: [facility] OR 2ND FLR;  Service: Cardiovascular;;    INSERTION OF INTRA-AORTIC BALLOON ASSIST DEVICE Right 11/21/2023    Procedure: INSERTION, INTRA-AORTIC BALLOON PUMP;  Surgeon: Finn Cohn MD;  Location: [facility] CATH LAB;  Service: Cardiology;  Laterality: Right;    LEFT VENTRICULAR ASSIST DEVICE Left 12/1/2023    Procedure: INSERTION-LEFT VENTRICULAR ASSIST DEVICE;  Surgeon: Yuri Washington MD;  Location: [facility] OR 2ND FLR;  Service: Cardiovascular;  Laterality: Left;  REDO STERNOTOMY - REDO SAW NEEDED FOR CASE    LYSIS OF ADHESIONS  12/1/2023    Procedure: LYSIS, ADHESIONS;  Surgeon: Yuri Washington MD;  Location: [facility] OR 2ND FLR;  Service: Cardiovascular;;    PLACEMENT OF SWAN ROLANDO CATHETER WITH IMAGING GUIDANCE  11/20/2023    Procedure: INSERTION, CATHETER, SWAN-ROLANDO, WITH IMAGING GUIDANCE;  Surgeon: Sajan Hurley MD;  Location: [facility] CATH LAB;  Service: Cardiology;;    REMOVAL OF TUNNELED CENTRAL VENOUS CATHETER (CVC) N/A 3/1/2024    Procedure: REMOVAL, CATHETER, CENTRAL VENOUS, TUNNELED;  Surgeon: Seble Aguilar MD;  Location: [facility] CATH LAB;  Service: Interventional Nephrology;  Laterality: N/A;    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 12/1/2023    Procedure: REPAIR, ANEURYSM, ARTERY, FEMORAL;   Surgeon: Yuri Washington MD;  [address]  Service: Cardiovascular;  Laterality: Right;  Right Femoral Artery Repair    RIGHT HEART CATHETERIZATION Right 10/10/2023    Procedure: INSERTION, CATHETER, RIGHT HEART;  Surgeon: Bin Gandhi MD;  Location: [facility] CATH LAB;  Service: Cardiology;  Laterality: Right;    RIGHT HEART CATHETERIZATION Right 10/13/2023    Procedure: INSERTION, CATHETER, RIGHT HEART;  Surgeon: Walter Mcintyre MD;  Location: [facility] CATH LAB;  Service: Cardiology;  Laterality: Right;    RIGHT HEART CATHETERIZATION  11/13/2023    RIGHT HEART CATHETERIZATION Right 11/13/2023    Procedure: INSERTION, CATHETER, RIGHT HEART;  Surgeon: Juventino Bermudez Jr., MD;  Location: [facility] CATH LAB;  Service: Cardiology;  Laterality: Right;    RIGHT HEART CATHETERIZATION Right 11/20/2023    Procedure: INSERTION, CATHETER, RIGHT HEART;  Surgeon: Sajan Hurley MD;  Location: [facility] CATH LAB;  Service: Cardiology;  Laterality: Right;    RIGHT HEART CATHETERIZATION Right 1/22/2024    Procedure: INSERTION, CATHETER, RIGHT HEART;  Surgeon: Brayan Ocampo MD;  Location: [facility] CATH LAB;  Service: Cardiology;  Laterality: Right;    STERNAL WOUND CLOSURE N/A 12/4/2023    Procedure: CLOSURE, WOUND, STERNUM;  Surgeon: Yuri Washington MD;  [address]  Service: Cardiovascular;  Laterality: N/A;    STERNOTOMY N/A 12/1/2023    Procedure: STERNOTOMY, REDO;  Surgeon: Yuri Washington MD;  [address]  Service: Cardiovascular;  Laterality: N/A;    VALVULOPLASTY, MITRAL VALVE N/A 12/1/2023    Procedure: VALVULOPLASTY, MITRAL VALVE;  Surgeon: Yuri Washington MD;  [facility];  Service: Cardiovascular;  Laterality: N/A;       Review of patient's allergies indicates:  No Known Allergies  Current Facility-Administered Medications   Medication Frequency    amiodarone tablet 400 mg Daily    cholestyramine 4 gram packet 4 g BID    dextrose 50% injection 12.5 g PRN     dextrose 50% injection 25 g PRN    diphenoxylate-atropine 2.5-0.025 mg per tablet 2 tablet QID PRN    droNABinol capsule 2.5 mg BID    glucagon (human recombinant) injection 1 mg PRN    glucose chewable tablet 16 g PRN    glucose chewable tablet 24 g PRN    hydrALAZINE tablet 75 mg Q8H    insulin aspart U-100 pen 0-5 Units QID (AC + HS) PRN    Lactobacillus rhamnosus GG capsule 1 capsule Daily    mupirocin 2 % ointment BID    venlafaxine tablet 37.5 mg Daily    warfarin (COUMADIN) tablet 1 mg Once per day on Sunday Tuesday Thursday Saturday    And    [START ON 7/23/2025] warfarin (COUMADIN) split tablet 0.5 mg Once per day on Monday Wednesday Friday     Family History    None       Tobacco Use    Smoking status: Former     Current packs/day: 0.50     Types: Cigarettes    Smokeless tobacco: Not on file   Vaping Use    Vaping status: Never Used   Substance and Sexual Activity    Alcohol use: Yes     Comment: rarely    Drug use: No    Sexual activity: Not Currently     Partners: Female     Review of Systems   Constitutional:  Positive for activity change and appetite change.   Eyes:  Negative for visual disturbance.   Respiratory:  Positive for shortness of breath.    Cardiovascular:  Positive for leg swelling.   Gastrointestinal:  Positive for diarrhea.   Genitourinary:  Positive for decreased urine volume.   Skin:  Negative for wound.   Neurological:  Positive for weakness.   Psychiatric/Behavioral:  Negative for agitation, behavioral problems and confusion.      Objective:     Vital Signs (Most Recent):  Temp: 98 °F (36.7 °C) (07/22/25 1145)  Pulse: 89 (07/22/25 1145)  Resp: 18 (07/22/25 1145)  BP: 100/78 (07/22/25 0810)  SpO2: 96 % (07/22/25 1233) Vital Signs (24h Range):  Temp:  [97.9 °F (36.6 °C)-99.4 °F (37.4 °C)] 98 °F (36.7 °C)  Pulse:  [80-92] 89  Resp:  [15-18] 18  SpO2:  [96 %-100 %] 96 %  BP: ()/(0-91) 100/78     Weight: 76.4 kg (168 lb 6.9 oz) (07/22/25 0517)  Body mass index is 22.22 kg/m².  Body  surface area is 1.98 meters squared.    No intake/output data recorded.     Physical Exam  Vitals and nursing note reviewed.   Constitutional:       General: He is not in acute distress.  Cardiovascular:      Rate and Rhythm: Normal rate.      Comments: LVAD hum  Pulmonary:      Effort: Pulmonary effort is normal.   Abdominal:      Tenderness: There is no abdominal tenderness.   Musculoskeletal:         General: Normal range of motion.      Cervical back: Normal range of motion.   Neurological:      Mental Status: He is alert and oriented to person, place, and time.   Psychiatric:         Mood and Affect: Mood normal.         Behavior: Behavior normal.          Significant Labs:  CBC:   Recent Labs   Lab 07/22/25 0339   WBC 4.45   RBC 3.36*   HGB 9.7*   HCT 30.5*   *   MCV 91   MCH 28.9   MCHC 31.8*     CMP:   Recent Labs   Lab 07/22/25 0339   GLU 72   CALCIUM 7.3*   ALBUMIN 2.0*   PROT 5.3*   *   K 3.3*   CO2 23      BUN 18   CREATININE 3.7*   ALKPHOS 114   ALT 69*   AST 98*   BILITOT 0.5     All labs within the past 24 hours have been reviewed.    Assessment/Plan:     Cardiac/Vascular  LVAD (left ventricular assist device) present  - defer to primary team     Renal/  ESRD (end stage renal disease)  65 y.o. Black or  Male ESRD-HD M-W-F presents to ED on 7/21/2025 with diagnosis of: Generalized weakness [R53.1];Failure to thrive in adult [R62.7]   Nephrology consulted for inpatient ESRD-HD management    Outpatient HD Information:  -Dialysis modality: Hemodialysis  -Outpatient HD unit: [facility]  -Nephrologist: ?  -HD TX days: Monday/Wednesday/Friday, duration of treatment: 3 hrs  -Last HD TX prior to hospital admission: 7/21/2025  -Dialysis access: dialysis catheter   -Residual urine: Minium  -EDW: 82.1 kg     Assessment:   - No emergent need for clearance and/or volume removal.   - Dialysis for metabolic clearance and volume management will be provided tomorrow AM.  - Consented  for routine dialysis today.  - Will obtain OP dialysis records if here longer than 1-2 days   - Continue to monitor intake and output  - Please avoid gadolinium, fleets, phos-based laxatives, NSAIDs  - Dialysis thrice weekly unless more urgent indications arise. Will evaluate RRT requirements Daily.  - Increased mortality risk secondary to ESRD on dialysis and HFrEF stage D s/p HM 3  in setting of repeated admissions for BLE weakness, diarrhea, and decrease appetite.   - OP records reviewed; inpatient dialysis orders to be adjusted as needed per OP records.       Anemia of ESRD   Recent Labs   Lab 07/21/25  1547 07/22/25  0339   WBC 4.21 4.45   HGB 10.6* 9.7*   HCT 33.3* 30.5*   * 133*     Lab Results   Component Value Date    FESATURATED 6 (L) 06/12/2024    FERRITIN 4,766.0 (H) 05/24/2025       - Goal in ESRD is Hgb of 10-11. Hgb 9.7. Near target.   - EPO can be administered and dosed per his OP unit upon discharge.  - if patient is on iron infusions please D/C when active infection, cautiously use EPO when hx of malignancy, high BP (SBP usually > 170mmhg).    Mineral Bone Disease in ESRD   Lab Results   Component Value Date    .7 (H) 06/18/2024    CALCIUM 7.3 (L) 07/22/2025    ALBUMIN 2.0 (L) 07/22/2025    CAION 1.16 01/23/2024    PHOS 1.3 (L) 07/22/2025       - F/U PO4, Mg, Calcium. And albumin levels daily.   - Please liberalized diet and protein intake goal 1.5 g/kg/d with 1 L fluid restriction   - If patient has poor oral intake, recommend nephro nutritional shakes (ex Novasource)  - Phos low. HOLD binders.     Endocrine  * Failure to thrive in adult  - defer to primary team         Thank you for your consult. I will follow-up with patient. Please contact us if you have any additional questions.    Matilda Hinton, RONI, FNP-C  Nephrology  [facility] - Cardiology Stepdown

## 2025-07-22 NOTE — PROGRESS NOTES
[facility] - Cardiology Stepdown  Heart Transplant  Progress Note    Patient Name: Radha Abbott  MRN: [id number]  Admission Date: 7/21/2025  Hospital Length of Stay: 0 days  Attending Physician: Walter Mcintyre MD  Primary Care Provider: Vasu Kong MD  Principal Problem:Failure to thrive in adult    Subjective:   Interval History:  No acute events noted overnight.  Patient denies CP, SOB, dizziness, lightheadedness, palpitations.  Patient presented yesterday to the ED for continued bilateral leg weakness, and diarrhea.  Patient has been hospitalized approximately 4 times since beginning of May for diarrhea, BLE weakness.  Patient did have a fall 1 week ago with speech disturbance in his right foot dragging, but CT head yesterday revealed no acute infarct, but chronic mastoiditis.  Palliative care consulted for goals of care discussion.    Continuous Infusions:  Scheduled Meds:   amiodarone  400 mg Oral Daily    cholestyramine  1 packet Oral BID    droNABinol  2.5 mg Oral BID    hydrALAZINE  75 mg Oral Q8H    Lactobacillus rhamnosus GG  1 capsule Oral Daily    mupirocin   Nasal BID    venlafaxine  37.5 mg Oral Daily    warfarin  1 mg Oral Once per day on Sunday Tuesday Thursday Saturday    And    [START ON 7/23/2025] warfarin  0.5 mg Oral Once per day on Monday Wednesday Friday     PRN Meds:  Current Facility-Administered Medications:     dextrose 50%, 12.5 g, Intravenous, PRN    dextrose 50%, 25 g, Intravenous, PRN    diphenoxylate-atropine 2.5-0.025 mg, 2 tablet, Oral, QID PRN    glucagon (human recombinant), 1 mg, Intramuscular, PRN    glucose, 16 g, Oral, PRN    glucose, 24 g, Oral, PRN    insulin aspart U-100, 0-5 Units, Subcutaneous, QID (AC + HS) PRN    Review of patient's allergies indicates:  No Known Allergies  Objective:     Vital Signs (Most Recent):  Temp: 98 °F (36.7 °C) (07/22/25 1145)  Pulse: 89 (07/22/25 1145)  Resp: 18 (07/22/25 1145)  BP: 100/78 (07/22/25 0810)  SpO2: 99 % (07/22/25 1145)  "Vital Signs (24h Range):  Temp:  [97.9 °F (36.6 °C)-99.4 °F (37.4 °C)] 98 °F (36.7 °C)  Pulse:  [80-92] 89  Resp:  [15-18] 18  SpO2:  [97 %-100 %] 99 %  BP: ()/(0-91) 100/78     Patient Vitals for the past 72 hrs (Last 3 readings):   Weight   07/22/25 0517 76.4 kg (168 lb 6.9 oz)     Body mass index is 22.22 kg/m².    No intake or output data in the 24 hours ending 07/22/25 1157    Hemodynamic Parameters:            Physical Exam  HENT:      Mouth/Throat:      Mouth: Mucous membranes are moist.   Eyes:      Pupils: Pupils are equal, round, and reactive to light.   Cardiovascular:      Rate and Rhythm: Normal rate and regular rhythm.      Heart sounds: Heart sounds not distant. No murmur heard.     No friction rub. No gallop.   Pulmonary:      Effort: Pulmonary effort is normal.      Breath sounds: Normal breath sounds.   Abdominal:      General: There is no distension.      Palpations: Abdomen is soft.      Tenderness: There is no abdominal tenderness. There is no guarding.   Musculoskeletal:      Right lower leg: No edema.      Left lower leg: No edema.   Skin:     General: Skin is warm and dry.      Capillary Refill: Capillary refill takes less than 2 seconds.   Neurological:      General: No focal deficit present.      Mental Status: He is alert and oriented to person, place, and time.   Psychiatric:         Mood and Affect: Mood normal.         Behavior: Behavior normal.            Significant Labs:  CBC:  Recent Labs   Lab 07/21/25  1547 07/22/25  0339   WBC 4.21 4.45   RBC 3.67* 3.36*   HGB 10.6* 9.7*   HCT 33.3* 30.5*   * 133*   MCV 91 91   MCH 28.9 28.9   MCHC 31.8* 31.8*     BNP:  No results for input(s): "BNP" in the last 168 hours.    Invalid input(s): "BNPTRIAGELBLO"  CMP:  Recent Labs   Lab 07/21/25  1547 07/22/25  0339   GLU 81 72   CALCIUM 7.7* 7.3*   ALBUMIN 2.2* 2.0*   PROT 6.1 5.3*   * 135*   K 3.2* 3.3*   CO2 26 23    105   BUN 12 18   CREATININE 3.2* 3.7*   ALKPHOS 119 " 114   ALT 80* 69*   * 98*   BILITOT 0.6 0.5      Coagulation:   Recent Labs   Lab 07/17/25  0828 07/21/25  1547 07/22/25  0339   INR 2.3* 2.0* 2.0*   APTT  --  43.0* 41.7*     LDH:  Recent Labs   Lab 07/22/25  0339   *     Microbiology:  Microbiology Results (last 7 days)       Procedure Component Value Units Date/Time    Blood culture [[id number]] Collected: 07/22/25 1049    Order Status: Sent Specimen: Blood from Peripheral, Hand, Left     Blood culture [[id number]] Collected: 07/22/25 1049    Order Status: Sent Specimen: Blood from Peripheral, Lower Arm, Left             I have reviewed all pertinent labs within the past 24 hours.    Estimated Creatinine Clearance: 22.1 mL/min (A) (based on SCr of 3.7 mg/dL (H)).    Diagnostic Results:  I have reviewed all pertinent imaging results/findings within the past 24 hours.  Assessment and Plan:     No notes on file    * Failure to thrive in adult  -Repeated admissions for BLE weakness, diarrhea.  Patient is still having issues with decrease in appetite, with appetite stimulant prescribed at home.  Patient also having continued diarrhea since being admitted for C diff infection 5/9.  Patient placed on probiotics Lomotil to help slow down diarrhea.  -Palliative care consulted for [facility] conversation  -Order Marinol 2.5 mg b.i.d. to help stimulate appetite.   -Order Questran 4 g p.o. b.i.d. to help with diarrhea.  -Order Nepro to increased caloric intake  -Consult PT/OT to evaluate patient mobility, wife requesting PT/OT at home to help rehab patient    Chronic combined systolic and diastolic CHF, NYHA class 4  -HFrEF stage D s/p HM 3 (01/2023). TTE 04/2025 reveals LV EF 10-15%, LVAD 5000 rpm, LVIDd 5.5 cm, trace TR  -Continue hydralazine 75 mg every 8 hours  -Diuresis:  Not currently on diuretics due to persistent diarrhea.  -Continue to monitor fluid status, strict I's and O, daily weight    LVAD (left ventricular assist device) present  Procedure: Device  Interrogation Including analysis of device parameters  Current Settings: Ventricular Assist Device  Review of device function is stable/unstable stable        7/22/2025     8:00 AM 7/22/2025     4:20 AM 7/21/2025    11:50 PM 7/14/2025     4:00 PM 7/14/2025    11:58 AM 7/14/2025     8:00 AM 7/14/2025     6:32 AM   TXP LVAD INTERROGATIONS   Type HeartMate3 HeartMate3 HeartMate3 HeartMate3 HeartMate3 HeartMate3 HeartMate3   Flow 3.4 3.2 3 2.8 2.9 3.1 3.3   Speed 5000 5000 5000 5000 5000 5000 5000   PI 6.6 7.5 8 8.6 8.9 8.5 7.4   Power ([place]) 3.4 3.3 3.4 3.5 3.4 3.5 3.4   LSL 4600 4600 4600 4600 4600 4600 4600   Pulsatility Intermittent pulse Pulse Pulse Pulse Pulse Pulse          PAF (paroxysmal atrial fibrillation)  -Telemetry reveals heart rate less than 100  -Continue amiodarone 400 mg oral daily for rhythm control  -Continue warfarin. INR therapeutic. Goal INR 2-3  -Electrolyte management per Nephrology    ESRD (end stage renal disease)  -On HD (M, W, F).  Kurt cath, right subclavian artery  -Nephrology consulted for renal function management        JUAN Dasilva-BC  Heart Transplant  [facility] - Cardiology Stepdown

## 2025-07-22 NOTE — ASSESSMENT & PLAN NOTE
-HFrEF stage D s/p  3 (01/2023). TTE 04/2025 reveals LV EF 10-15%, LVAD 5000 rpm, LVIDd 5.5 cm, trace TR  -Continue hydralazine 75 mg every 8 hours  -Diuresis:  Not currently on diuretics due to persistent diarrhea.  -Continue to monitor fluid status, strict I's and O, daily weight

## 2025-07-22 NOTE — SUBJECTIVE & OBJECTIVE
Past Medical History:   Diagnosis Date    Aspiration pneumonia of both lungs 06/17/2024    CAD (coronary artery disease)     CHF (congestive heart failure)     Delirium 06/16/2024    Diabetes mellitus     HFrEF (heart failure with reduced ejection fraction)     Hypoglycemia 06/12/2024    ICD (implantable cardioverter-defibrillator) in place     LVAD (left ventricular assist device) present 12/01/2023    MI, old     PAF (paroxysmal atrial fibrillation) 10/11/2023    Stage 4 chronic kidney disease 02/15/2024    Type 2 diabetes mellitus without complication, without long-term current use of insulin 10/07/2023       Past Surgical History:   Procedure Laterality Date    ANGIOPLASTY-VENOUS ARTERY Right 12/1/2023    Procedure: ANGIOPLASTY-VENOUS ARTERY, RIGHT FEMORAL;  Surgeon: Yuri Washington MD;  [facility];  Service: Cardiovascular;  Laterality: Right;    AORTIC VALVULOPLASTY N/A 12/1/2023    Procedure: REPAIR, AORTIC VALVE;  Surgeon: Yuri Washington MD;  [facility];  Service: Cardiovascular;  Laterality: N/A;    CARDIAC SURGERY      CLOSURE N/A 12/1/2023    Procedure: CLOSURE, TEMPORARY;  Surgeon: Yuri Washington MD;  Location: [facility] OR [facility] F[facility];  Service: Cardiovascular;  Laterality: N/A;    DRAINAGE OF PLEURAL EFFUSION  12/4/2023    Procedure: DRAINAGE, PLEURAL EFFUSION;  Surgeon: Yuri Washington MD;  Location: [facility] OR [facility] F[facility];  Service: Cardiovascular;;    INSERTION OF GRAFT TO PERICARDIUM  12/4/2023    Procedure: INSERTION, GRAFT, PERICARDIUM;  Surgeon: Yuri Washington MD;  [facility];  Service: Cardiovascular;;    INSERTION OF INTRA-AORTIC BALLOON ASSIST DEVICE Right 11/21/2023    Procedure: INSERTION, INTRA-AORTIC BALLOON PUMP;  Surgeon: Finn Cohn MD;  Location: [facility] CATH LAB;  Service: Cardiology;  Laterality: Right;    LEFT VENTRICULAR ASSIST DEVICE Left 12/1/2023    Procedure: INSERTION-LEFT VENTRICULAR ASSIST DEVICE;  Surgeon: Yuri Washington MD;   Location: [address] FLR;  Service: Cardiovascular;  Laterality: Left;  REDO STERNOTOMY - REDO SAW NEEDED FOR CASE    LYSIS OF ADHESIONS  12/1/2023    Procedure: LYSIS, ADHESIONS;  Surgeon: Yuri Washington MD;  [address];  Service: Cardiovascular;;    PLACEMENT OF SWAN ROLANDO CATHETER WITH IMAGING GUIDANCE  11/20/2023    Procedure: INSERTION, CATHETER, SWAN-ROLANDO, WITH IMAGING GUIDANCE;  Surgeon: Sajan Hurley MD;  Location: [facility] CATH LAB;  Service: Cardiology;;    REMOVAL OF TUNNELED CENTRAL VENOUS CATHETER (CVC) N/A 3/1/2024    Procedure: REMOVAL, CATHETER, CENTRAL VENOUS, TUNNELED;  Surgeon: Seble Aguilar MD;  Location: [facility] CATH LAB;  Service: Interventional Nephrology;  Laterality: N/A;    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 12/1/2023    Procedure: REPAIR, ANEURYSM, ARTERY, FEMORAL;  Surgeon: Yuri Washington MD;  [address]  Service: Cardiovascular;  Laterality: Right;  Right Femoral Artery Repair    RIGHT HEART CATHETERIZATION Right 10/10/2023    Procedure: INSERTION, CATHETER, RIGHT HEART;  Surgeon: Bin Gandhi MD;  Location: [facility] CATH LAB;  Service: Cardiology;  Laterality: Right;    RIGHT HEART CATHETERIZATION Right 10/13/2023    Procedure: INSERTION, CATHETER, RIGHT HEART;  Surgeon: Walter Mcintyre MD;  Location: [facility] CATH LAB;  Service: Cardiology;  Laterality: Right;    RIGHT HEART CATHETERIZATION  11/13/2023    RIGHT HEART CATHETERIZATION Right 11/13/2023    Procedure: INSERTION, CATHETER, RIGHT HEART;  Surgeon: Juventino Bermudez Jr., MD;  Location: [facility] CATH LAB;  Service: Cardiology;  Laterality: Right;    RIGHT HEART CATHETERIZATION Right 11/20/2023    Procedure: INSERTION, CATHETER, RIGHT HEART;  Surgeon: Sajan Hurley MD;  Location: [facility] CATH LAB;  Service: Cardiology;  Laterality: Right;    RIGHT HEART CATHETERIZATION Right 1/22/2024    Procedure: INSERTION, CATHETER, RIGHT HEART;  Surgeon: Brayan Ocampo MD;  Location: [facility]  LAB;  Service: Cardiology;  Laterality: Right;    STERNAL WOUND CLOSURE N/A 12/4/2023    Procedure: CLOSURE, WOUND, STERNUM;  Surgeon: Yuri Washington MD;  [facility];  Service: Cardiovascular;  Laterality: N/A;    STERNOTOMY N/A 12/1/2023    Procedure: STERNOTOMY, REDO;  Surgeon: Yuri Washington MD;  [facility];  Service: Cardiovascular;  Laterality: N/A;    VALVULOPLASTY, MITRAL VALVE N/A 12/1/2023    Procedure: VALVULOPLASTY, MITRAL VALVE;  Surgeon: Yuri Washington MD;  [facility];  Service: Cardiovascular;  Laterality: N/A;       Review of patient's allergies indicates:  No Known Allergies    Current Facility-Administered Medications on File Prior to Encounter   Medication    [COMPLETED] warfarin (COUMADIN) split tablet 0.5 mg    [DISCONTINUED] warfarin (COUMADIN) split tablet 0.5 mg     Current Outpatient Medications on File Prior to Encounter   Medication Sig    amiodarone (PACERONE) 400 MG tablet Take 1 tablet (400 mg total) by mouth once daily.    diphenoxylate-atropine 2.5-0.025 mg (LOMOTIL) 2.5-0.025 mg per tablet Take 2 tablets by mouth 4 (four) times daily as needed for Diarrhea.    hydrALAZINE (APRESOLINE) 25 MG tablet Take 3 tablets (75 mg total) by mouth every 8 (eight) hours.    Lactobacillus rhamnosus GG (CULTURELLE) 10 billion cell capsule Take 1 capsule by mouth once daily.    potassium, sodium phosphates (PHOS-NAK) 280-160-250 mg PwPk Take 1 packet by mouth 4 (four) times daily before meals and nightly.    pulse oximeter (PULSE OXIMETER) device by Apply Externally route 2 (two) times a day. Use twice daily at 8 AM and 3 PM and [facility].    traZODone (DESYREL) 50 MG tablet Take 0.5 tablets (25 mg total) by mouth nightly as needed for Insomnia.    venlafaxine (EFFEXOR) 37.5 MG Tab Take 1 tablet (37.5 mg total) by mouth once daily.    vitamin D (VITAMIN D3) 1000 units Tab Take 1 tablet by mouth once daily    warfarin  (COUMADIN) 1 MG tablet Take 1 tablet (1 mg total) by mouth every Tuesday, Thursday, Saturday, Sunday AND 0.5 tablets (0.5 mg total) every Mon, Wed, Fri. Or as directed by coumadin clinic.     Family History    None       Tobacco Use    Smoking status: Former     Current packs/day: 0.50     Types: Cigarettes    Smokeless tobacco: Not on file   Vaping Use    Vaping status: Never Used   Substance and Sexual Activity    Alcohol use: Yes     Comment: rarely    Drug use: No    Sexual activity: Not Currently     Partners: Female     Review of Systems   Constitutional: Positive for malaise/fatigue. Negative for chills and fever.   HENT:  Negative for congestion.    Eyes:  Negative for blurred vision and visual disturbance.   Cardiovascular:  Positive for dyspnea on exertion, orthopnea and paroxysmal nocturnal dyspnea. Negative for chest pain, irregular heartbeat, leg swelling, near-syncope, palpitations and syncope.   Respiratory:  Positive for shortness of breath. Negative for cough, sputum production and wheezing.    Skin:  Negative for color change.   Musculoskeletal:  Negative for arthritis and muscle weakness.   Gastrointestinal:  Negative for bloating, abdominal pain, change in bowel habit, nausea and vomiting.   Genitourinary:  Negative for dysuria.   Neurological:  Negative for dizziness and light-headedness.   Psychiatric/Behavioral:  The patient is not nervous/anxious.      Objective:     Vital Signs (Most Recent):  Temp: 98.4 °F (36.9 °C) (07/22/25 0313)  Pulse: 91 (07/22/25 0313)  Resp: 18 (07/22/25 0313)  BP: (!) 129/90 (07/22/25 0313)  SpO2: 97 % (07/22/25 0313) Vital Signs (24h Range):  Temp:  [98.2 °F (36.8 °C)-98.5 °F (36.9 °C)] 98.4 °F (36.9 °C)  Pulse:  [85-91] 91  Resp:  [15-18] 18  SpO2:  [97 %-100 %] 97 %  BP: ()/(0-91) 129/90          There is no height or weight on file to calculate BMI.    SpO2: 97 %        Physical Exam  Constitutional:       General: He is not in acute distress.      "Appearance: Normal appearance. He is not ill-appearing.   HENT:      Head: Normocephalic and atraumatic.      Mouth/Throat:      Mouth: Mucous membranes are moist.   Eyes:      Extraocular Movements: Extraocular movements intact.      Pupils: Pupils are equal, round, and reactive to light.   Neck:      Comments: No JVD elevation  Cardiovascular:      Rate and Rhythm: Normal rate and regular rhythm.      Heart sounds: No murmur heard.     No friction rub. No gallop.   Pulmonary:      Effort: No respiratory distress.      Breath sounds: No rales.   Abdominal:      General: Bowel sounds are normal. There is no distension.      Tenderness: There is no abdominal tenderness.   Musculoskeletal:      Cervical back: No rigidity.      Right lower leg: No edema.      Left lower leg: No edema.   Skin:     General: Skin is warm.      Capillary Refill: Capillary refill takes less than 2 seconds.   Neurological:      General: No focal deficit present.      Mental Status: He is alert and oriented to person, place, and time.      Sensory: No sensory deficit.      Comments: Strength 5/5 all 4 extremities except 3/5 in left lower extremity   Psychiatric:         Mood and Affect: Mood normal.         Behavior: Behavior normal.            Significant Labs: EP:   Recent Labs   Lab 07/21/25  1547   *   K 3.2*      CO2 26   GLU 81   BUN 12   CREATININE 3.2*   CALCIUM 7.7*   PROT 6.1   ALBUMIN 2.2*   BILITOT 0.6   ALKPHOS 119   *   ALT 80*   ANIONGAP 7*   WBC 4.21   HGB 10.6*   HCT 33.3*   *   INR 2.0*   PROTIME 21.9*   , ABG: No results for input(s): "PH", "PCO2", "HCO3", "POCSATURATED", "BE" in the last 48 hours., Blood Culture: No results for input(s): "LABBLOO" in the last 48 hours., BMP:   Recent Labs   Lab 07/21/25  1547   GLU 81   *   K 3.2*      CO2 26   BUN 12   CREATININE 3.2*   CALCIUM 7.7*   , CMP:   Recent Labs   Lab 07/21/25  1547   *   K 3.2*      CO2 26   GLU 81   BUN 12 " "  CREATININE 3.2*   CALCIUM 7.7*   PROT 6.1   ALBUMIN 2.2*   BILITOT 0.6   ALKPHOS 119   *   ALT 80*   ANIONGAP 7*   , CBC:   Recent Labs   Lab 07/21/25  1547   WBC 4.21   HGB 10.6*   HCT 33.3*   *   , INR:   Recent Labs   Lab 07/21/25  1547   INR 2.0*   PROTIME 21.9*   , Lipid Panel No results for input(s): "CHOL", "HDL", "LDLCALC", "TRIG", "CHOLHDL" in the last 48 hours., Troponin   Recent Labs   Lab 07/21/25  1547   TROPONINI 0.106*   , and All pertinent lab results from the last 24 hours have been reviewed.    Significant Imaging: Echocardiogram: 2D echo with color flow doppler: No results found. However, due to the size of the patient record, not all encounters were searched. Please check Results Review for a complete set of results. and Transthoracic echo (TTE) complete (Cupid Only):   Results for orders placed or performed during the hospital encounter of 04/02/25   Echo   Result Value Ref Range    LV Diastolic Volume 118 mL    Echo EF Estimated 8 %    LV Systolic Volume 108 mL    IVS 0.9 0.6 - 1.1 cm    LVIDd 5.0 3.5 - 6.0 cm    LVIDs 4.8 (A) 2.1 - 4.0 cm    LVOT diameter 2.1 cm    PW 0.9 0.6 - 1.1 cm    RV S' 6.15 cm/s    LA size 3.9 cm    Left Atrium Major Axis 5.3 cm    Left Atrium Minor Axis 4.3 cm    LA Vol (MOD) 59 mL    MV Peak A Zeferino 1.01 m/s    E wave deceleration time 263 ms    MV Peak E Zeferino 0.70 m/s    E/A ratio 0.69     LV LATERAL E/E' RATIO 14.0     LV SEPTAL E/E' RATIO 17.5     TDI LATERAL 0.05 m/s    TDI SEPTAL 0.04 m/s    TV peak gradient 20 mmHg    TR Max Zeferino 2.2 m/s    Ascending aorta 3.07 cm    STJ 2.83 cm    Sinus 3.27 cm    LA WIDTH 3.6 cm    TAPSE 1.23 cm    BSA 2.04 m2    LV Systolic Volume Index 52.7 mL/m2    LV Diastolic Volume Index 57.56 mL/m2    LVOT area 3.5 cm2    FS 4.0 (A) 28 - 44 %    Left Ventricle Relative Wall Thickness 0.36 cm    LV mass 158.2 g    LV Mass Index 77.2 g/m2    E/E' ratio 16 m/s    KAVON 28 mL/m2    LA Vol 57 cm3    KAVON (MOD) 29 mL/m2    Mean e' " 0.05 m/s    ZLVIDS 1.92     ZLVIDD -2.07     TV resting pulmonary artery pressure 27 mmHg    RV TB RVSP 10 mmHg    Est. RA pres 8 mmHg    IVC diameter 2.4 cm    LVAD  Rate 5,000 rpm    Narrative      LVAD: There is a Heartmate III LVAD running at 5,000 RPM.    Left Ventricle: The left ventricle is normal in size. Normal wall   thickness. Global hypokinesis present. There is severely reduced systolic   function with a visually estimated ejection fraction of 10 -15%. Unable to   assess diastolic function due to LVAD.    Right Ventricle: The right ventricle is normal in size. Right ventricle   wall motion has global hypokinesis. Systolic function is mildly reduced.   Pacemaker lead present in the ventricle.    Left Atrium: Left atrium is dilated.    Aortic Valve: The aortic valve repaired.    Mitral Valve: The mitral valve is repaired with an Noble stitch.    Pulmonary Artery: The estimated pulmonary artery systolic pressure is   27 mmHg.    IVC/SVC: Intermediate venous pressure at 8 mmHg.       CT head 7/21/25  No acute intracranial abnormalities

## 2025-07-22 NOTE — PROGRESS NOTES
Admit Note      Met with patient and spouse to assess patients needs. Patient is a 65 y.o.  male admitted debility and fatigue per medical record. Spouse reports pt with increasing confusion. Pt received LVAD in 2023. Pt's spouse does pt's dressing changes.    Patient admitted on 7/21/2025. At this time, patient presents as alert and oriented to person, place and purpose. Pt's spouse aaox4 with pleasant affect, good eye contact, asking and answering questions appropriately.      Household/Family Systems    Patient resides with his spouse at:    [address] [place] [address]    Pt's cell: [phone]    Additional contacts:  Arlyn Abbott, spouse, [phone]/M or [phone]/W  Teetee Zhou, sister, [phone] (retired and assist pt while spouse is at work)    Support system includes pt's spouse and sister Teetee. Pt has 4 adult children from a previous relationship that reside in [place], [place] (20 mins away from pt). Pt's spouse also has an adult son from a previous relationship that resides in [place], [place]. Patient does not have dependents that are in need of care.     Patients primary caregiver is spouse. Pt's spouse is employed full-time at [employer] as a patient access rep.     During admission, patient's caregiver plans to stay in patient's room. Confirmed patient and patients caregivers do have access to reliable transportation.    Cognitive Status/Learning     Patients caregiver reports patients reading ability as 12th grade and states patient does not have difficulty with reading, writing, seeing, hearing, comprehension, learning, and memory. Pt wears bifocal eyeglasses for reading. Patient reports patient learns best by multiple methods: audio, visual, and hands-on.   Needed: No.   Highest education level: High School (9-12) or GED    Vocation/Disability    Working for Income: No  If no, reason not working: Disability  Pt is disabled due to HF since 2022. Prior to  disability, pt was a self-employed .    Adherence     Patients caregiver reports patient has a high level of adherence to his health care regimen. Adherence counseling and education provided. Patient verbalizes understanding.    Substance Use    Patient reports patients substance usage as the following:    Tobacco: Pt confirms he quit smoking cigarettes in 2022.  Alcohol: Pt denies current use.  Illicit Drugs/Non-prescribed Medications: none, patient denies any use.    Patients caregiver states clear understanding of the potential impact of substance use. Substance abstinence/cessation counseling, education and resources provided and reviewed.     Services Utilizing/ADLS    Infusion Service: Prior to admission, patient utilizing? no Utilized [facility] (p [phone], fax [phone]) in the past   Home Health: Prior to admission, patient utilizing? no Utilized [facility] ([phone]) in the past. Pt has gone to [facility] on Tuesdays and Thursdays, [phone] when he is able.    NOTE: pt and spouse state preference for the above 2 agencies due to past experience with them. Pt and spouse decline list of alternative agencies at this time.     DME: Prior to admission, yes Pt has a RW, Rollator, PRW, WC, and BSC at home. Spouse requests hospital bed at discharge.  Pulmonary/Cardiac Rehab: Prior to admission, no  Dialysis:  Prior to admission, yes OP HD at [facility] on MWF, 6:30 am shift, ph [phone], fax [phone]  Transplant Specialty Pharmacy:  Prior to admission, no.    Prior to admission, patient reports patient was not independent with ADLS and was not driving.  Patient reports patient is not able to care for self at this time due to compromised medical condition (as documented in medical record) and physical weakness. Patient reports patient indicates a willingness to care for self once medically cleared to do so.    Insurance/Medications    Insured by   Payer/Plan Subscr   Sex Relation Sub. Ins. ID Effective Group Num   1. MEDICAID - [place] BUCK 1960 Male Self [id number]                                   [address] [address]   2. [facility] 1960 Male Self [id number] 10/9/23                                    PO       Primary Insurance (for UNOS reporting): Public Insurance - Medicaid  Secondary Insurance (for UNOS reporting): None    Patient reports patient is able to obtain and afford medications at this time and at time of discharge.    Living Will/Healthcare Power of     Patients caregiver reports patient has a LW and/or HCPA.  Pt's spouse is sole agent. Documents under Media tab in Epic.     Coping/Mental Health     Pt denies mental health concerns at this time Spouse reports pt has increasing confusion. General support provided.    Discharge Planning    At time of discharge, patient plans to return to his home under the care of spouse and sister Teetee. Patients spouse will transport patient. Per rounds today, expected discharge date has not been medically determined at this time. Pt's spouse verbalizes understanding and is involved in treatment planning and discharge process.     Additional Concerns    Patient's caretaker denies additional needs and/or concerns at this time. Patient is being followed for needs, education, resources, information, emotional support, supportive counseling, and for supportive and skilled discharge plan of care.  providing ongoing psychosocial support, education, resources and d/c planning as needed.  SW remains available. Patient denies additional needs and/or concerns at this time. Patient verbalizes understanding and agreement with information reviewed, social work availability, and how to access available resources as needed.

## 2025-07-22 NOTE — PLAN OF CARE
Pt engaged fairly in therapy this date, limited by fatigue.     Problem: Occupational Therapy  Goal: Occupational Therapy Goal  Description: Goals to be met by: 8/12/25     Patient will increase functional independence with ADLs by performing:    UE Dressing with Minimal Assistance.  LE Dressing with Moderate Assistance.  Grooming while bedside chair with Contact Guard Assistance.  Toileting from toilet with Maximum Assistance for hygiene and clothing management.   Supine to sit with Minimal Assistance.  Step transfer with Minimal Assistance  Toilet transfer to toilet with Minimal Assistance.    Outcome: Progressing

## 2025-07-22 NOTE — NURSING
Notified Wax.NP hands tremor sometimes, and disorganized speech sometimes, pt's wife said it's not new.

## 2025-07-22 NOTE — HPI
The patient is a 65 y.o. Black or  Male with multiple co morbidities including stage D HFrEF 2/2 ICM s/p HM3 as DT (12/1/2023), ESRD on HD (MWF), and debility who presents to ED on 7/21/2025 with complaints of bilateral leg weakness. Of note, the patient has been hospitalized multiple times w similar complaints including C -difficile infection and low-flow alarms. Wife reports progressive LE weakness x 1 week and decreased appetite. Denies complaints of shortness of breath and CP. The patient is MWF dialysis patient and was last dialyzed yesterday. Nephrology consulted for management of ESRD and HD treatment.

## 2025-07-22 NOTE — PROGRESS NOTES
07/22/2025  Serena Abraham    Current provider:  Walter Mcintyre MD    Device interrogation:      7/22/2025     4:20 AM 7/21/2025    11:50 PM 7/14/2025     4:00 PM 7/14/2025    11:58 AM 7/14/2025     8:00 AM 7/14/2025     6:32 AM 7/13/2025    11:31 PM   TXP LVAD INTERROGATIONS   Type HeartMate3 HeartMate3 HeartMate3 HeartMate3 HeartMate3 HeartMate3 HeartMate3   Flow 3.2 3 2.8 2.9 3.1 3.3 3.9   Speed 5000 5000 5000 5000 5000 5000 5000   PI 7.5 8 8.6 8.9 8.5 7.4 9.1   Power ([place]) 3.3 3.4 3.5 3.4 3.5 3.4 3.5   LSL 4600 4600 4600 4600 4600 4600 4600   Pulsatility Pulse Pulse Pulse Pulse Pulse  Intermittent pulse          Rounded on [facility] to ensure all mechanical assist device settings (IABP or VAD) were appropriate and all parameters were within limits.  I was able to ensure all back up equipment was present, the staff had no issues, and the Perfusion Department daily rounding was complete.      For implantable VADs: Interrogation of Ventricular assist device was performed with analysis of device parameters and review of device function. I have personally reviewed the interrogation findings and agree with findings as stated.     In emergency, the nursing units have been notified to contact the perfusion department either by:  Calling [id number] from 630am to 4pm Mon thru Fri, utilizing the On-Call Finder functionality of Epic and searching for Perfusion, or by contacting the hospital  from 4pm to 630am and on weekends and asking to speak with the perfusionist on call.    6:57 AM

## 2025-07-22 NOTE — HPI
65 M PMHx of stage D HFrEF 2/2 ICM s/p HM3 as DT (12/1/2023), ESRD on HD (MWF), and debility here for bilateral leg weakness.      Pertinent history below:     5/9-11: Patient was recently hospitalized for anemia and C-difficile infection. At that time patient received a unit of blood, hydrated for low flow alarms, hydralazine given to target MAPs to 80s with Hydral 50mg TID, then put on extended vancomycin course with taper for discharge on 5/11     He was admitted again for ongoing diarrhea and low-flow alarms from 05/23 - 05/26/2025 and treated with IV fluids.  His last clinic visit with heart transplant Service with Dr. Ocampo on 06/11/2023.  His C diff report was positive on August 2024, 05/10/2025.  He was negative for C diff infection on 05/23/2025, 07/10/2025     Patient was admitted for weakness, diarrhea, loss of appetite with low-flow alarms from 7/10-7/14/25 and repeat  C diff testing on 7/12 was negative also.  He was recommended supportive care with probiotics, and Lomotil.     Patient and by bedside reports complaints of progressive weakness in both legs for past 1 week associated with loss of appetite.  Patient and wife reports significant improvement in diarrhea since discharge and with Lomotil and supportive care.  Patient reports that he fell on his back a week ago because of weakness.  He also reports he visited emergency room where CAT scan had was done.  Patient's wife is also reporting that she has not eat some speech disturbance for past 1 week also he is dragging his right foot when he has walks.  Patient reports shortness of breath with minimal exertion but denies any active chest pain, lightheadedness, palpitations.  Denies any low-flow alarms at home.  Patient is getting admitted under the heart transplant Service for further management.

## 2025-07-22 NOTE — NURSING
Rounded on patient, wife at bedside. Spoke to patient and wife about patient's decline in functional status. Wife attributes this to not being able to participate in outpatient therapy for almost 3 weeks since he has diarrhea. He also has continued with a decrease in eating; wife states he truly has no appetite. He will drink protein shakes. I asked patient if he still wants to continue with medical treatment and therapy; he says yes, he is not ready to stop. He is in agreement for therapy to come to his house again until he is able to go back to outpatient therapy. He is also open to speaking with Palliative Care today to review his goals of care and how we can help him best and align with what he wants.     Wife asked about [facility] in . She has a family member that is a speech therapist there and told her that they accept VAD patients. I informed wife that I would reach out to inquire.

## 2025-07-22 NOTE — ASSESSMENT & PLAN NOTE
- Continue hydralazine 75mg every 8 hours  - no ACE inhibitor ARB,ARNI, MRA because of low blood pressure  - No beta blocker because of RV dysfunction

## 2025-07-22 NOTE — PT/OT/SLP PROGRESS
Physical Therapy      Patient Name:  Radha Abbott   MRN:  [id number]    PT orders received and acknowledged. Patient not seen today secondary to echo at bedside upon PT attempt at 1112. Therapist unable to return for follow up attempt. Will follow-up as appropriate for PT evaluation.    7/22/2025

## 2025-07-22 NOTE — ASSESSMENT & PLAN NOTE
Monitor UOP and serial BMP and adjust therapy as needed. Renally dose meds. Avoid nephrotoxic medications and procedures. Nephrology consulted for dialysis

## 2025-07-22 NOTE — SUBJECTIVE & OBJECTIVE
Interval History:     Past Medical History:   Diagnosis Date    Aspiration pneumonia of both lungs 06/17/2024    CAD (coronary artery disease)     CHF (congestive heart failure)     Delirium 06/16/2024    Diabetes mellitus     HFrEF (heart failure with reduced ejection fraction)     Hypoglycemia 06/12/2024    ICD (implantable cardioverter-defibrillator) in place     LVAD (left ventricular assist device) present 12/01/2023    MI, old     PAF (paroxysmal atrial fibrillation) 10/11/2023    Stage 4 chronic kidney disease 02/15/2024    Type 2 diabetes mellitus without complication, without long-term current use of insulin 10/07/2023       Past Surgical History:   Procedure Laterality Date    ANGIOPLASTY-VENOUS ARTERY Right 12/1/2023    Procedure: ANGIOPLASTY-VENOUS ARTERY, RIGHT FEMORAL;  Surgeon: Yuri Washington MD;  [facility];  Service: Cardiovascular;  Laterality: Right;    AORTIC VALVULOPLASTY N/A 12/1/2023    Procedure: REPAIR, AORTIC VALVE;  Surgeon: Yuri Washington MD;  [facility];  Service: Cardiovascular;  Laterality: N/A;    CARDIAC SURGERY      CLOSURE N/A 12/1/2023    Procedure: CLOSURE, TEMPORARY;  Surgeon: Yuri Washington MD;  Location: [facility] OR [facility];  Service: Cardiovascular;  Laterality: N/A;    DRAINAGE OF PLEURAL EFFUSION  12/4/2023    Procedure: DRAINAGE, PLEURAL EFFUSION;  Surgeon: Yuri Washington MD;  Location: [facility] OR [facility];  Service: Cardiovascular;;    INSERTION OF GRAFT TO PERICARDIUM  12/4/2023    Procedure: INSERTION, GRAFT, PERICARDIUM;  Surgeon: Yuri Washington MD;  [facility];  Service: Cardiovascular;;    INSERTION OF INTRA-AORTIC BALLOON ASSIST DEVICE Right 11/21/2023    Procedure: INSERTION, INTRA-AORTIC BALLOON PUMP;  Surgeon: Finn Cohn MD;  Location: [facility] CATH LAB;  Service: Cardiology;  Laterality: Right;    LEFT VENTRICULAR ASSIST DEVICE Left 12/1/2023    Procedure: INSERTION-LEFT VENTRICULAR ASSIST DEVICE;  Surgeon: Padmini  MD Yuri;  [address]  Service: Cardiovascular;  Laterality: Left;  REDO STERNOTOMY - REDO SAW NEEDED FOR CASE    LYSIS OF ADHESIONS  12/1/2023    Procedure: LYSIS, ADHESIONS;  Surgeon: Yuri Washington MD;  [address];  Service: Cardiovascular;;    PLACEMENT OF SWAN ROLANDO CATHETER WITH IMAGING GUIDANCE  11/20/2023    Procedure: INSERTION, CATHETER, SWAN-ROLANDO, WITH IMAGING GUIDANCE;  Surgeon: Sajan Hurley MD;  Location: [facility] CATH LAB;  Service: Cardiology;;    REMOVAL OF TUNNELED CENTRAL VENOUS CATHETER (CVC) N/A 3/1/2024    Procedure: REMOVAL, CATHETER, CENTRAL VENOUS, TUNNELED;  Surgeon: Seble Aguilar MD;  Location: [facility] CATH LAB;  Service: Interventional Nephrology;  Laterality: N/A;    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 12/1/2023    Procedure: REPAIR, ANEURYSM, ARTERY, FEMORAL;  Surgeon: Yuri Washington MD;  [address]  Service: Cardiovascular;  Laterality: Right;  Right Femoral Artery Repair    RIGHT HEART CATHETERIZATION Right 10/10/2023    Procedure: INSERTION, CATHETER, RIGHT HEART;  Surgeon: Bin Gandhi MD;  Location: [facility] CATH LAB;  Service: Cardiology;  Laterality: Right;    RIGHT HEART CATHETERIZATION Right 10/13/2023    Procedure: INSERTION, CATHETER, RIGHT HEART;  Surgeon: Walter Mcintyre MD;  Location: [facility] CATH LAB;  Service: Cardiology;  Laterality: Right;    RIGHT HEART CATHETERIZATION  11/13/2023    RIGHT HEART CATHETERIZATION Right 11/13/2023    Procedure: INSERTION, CATHETER, RIGHT HEART;  Surgeon: Juventino Bermudez Jr., MD;  Location: [facility] CATH LAB;  Service: Cardiology;  Laterality: Right;    RIGHT HEART CATHETERIZATION Right 11/20/2023    Procedure: INSERTION, CATHETER, RIGHT HEART;  Surgeon: Sajan Hurley MD;  Location: [facility] CATH LAB;  Service: Cardiology;  Laterality: Right;    RIGHT HEART CATHETERIZATION Right 1/22/2024    Procedure: INSERTION, CATHETER, RIGHT HEART;  Surgeon: Brayan Ocampo MD;   Location: [facility] CATH LAB;  Service: Cardiology;  Laterality: Right;    STERNAL WOUND CLOSURE N/A 12/4/2023    Procedure: CLOSURE, WOUND, STERNUM;  Surgeon: Yuri Washington MD;  [facility];  Service: Cardiovascular;  Laterality: N/A;    STERNOTOMY N/A 12/1/2023    Procedure: STERNOTOMY, REDO;  Surgeon: Yuri Washington MD;  [facility];  Service: Cardiovascular;  Laterality: N/A;    VALVULOPLASTY, MITRAL VALVE N/A 12/1/2023    Procedure: VALVULOPLASTY, MITRAL VALVE;  Surgeon: Yuri Washington MD;  [facility];  Service: Cardiovascular;  Laterality: N/A;       Review of patient's allergies indicates:  No Known Allergies    Medications:  Continuous Infusions:  Scheduled Meds:   amiodarone  400 mg Oral Daily    cholestyramine  1 packet Oral BID    droNABinol  2.5 mg Oral BID    hydrALAZINE  75 mg Oral Q8H    Lactobacillus rhamnosus GG  1 capsule Oral Daily    mupirocin   Nasal BID    venlafaxine  37.5 mg Oral Daily    warfarin  1 mg Oral Once per day on Sunday Tuesday Thursday Saturday    And    [START ON 7/23/2025] warfarin  0.5 mg Oral Once per day on Monday Wednesday Friday     PRN Meds:  Current Facility-Administered Medications:     dextrose 50%, 12.5 g, Intravenous, PRN    dextrose 50%, 25 g, Intravenous, PRN    diphenoxylate-atropine 2.5-0.025 mg, 2 tablet, Oral, QID PRN    glucagon (human recombinant), 1 mg, Intramuscular, PRN    glucose, 16 g, Oral, PRN    glucose, 24 g, Oral, PRN    insulin aspart U-100, 0-5 Units, Subcutaneous, QID (AC + HS) PRN    Family History    None       Tobacco Use    Smoking status: Former     Current packs/day: 0.50     Types: Cigarettes    Smokeless tobacco: Not on file   Vaping Use    Vaping status: Never Used   Substance and Sexual Activity    Alcohol use: Yes     Comment: rarely    Drug use: No    Sexual activity: Not Currently     Partners: Female       Review of Systems   Constitutional:  Positive for activity change, appetite change and  unexpected weight change.   Respiratory:  Positive for shortness of breath.    Cardiovascular:  Positive for leg swelling.   Gastrointestinal:  Positive for diarrhea.   Neurological:  Positive for weakness.   Psychiatric/Behavioral:  Negative for agitation, behavioral problems and confusion.      Objective:     Vital Signs (Most Recent):  Temp: 98 °F (36.7 °C) (07/22/25 1145)  Pulse: 89 (07/22/25 1145)  Resp: 18 (07/22/25 1145)  BP: 100/78 (07/22/25 0810)  SpO2: 96 % (07/22/25 1233) Vital Signs (24h Range):  Temp:  [97.9 °F (36.6 °C)-99.4 °F (37.4 °C)] 98 °F (36.7 °C)  Pulse:  [80-92] 89  Resp:  [15-18] 18  SpO2:  [96 %-100 %] 96 %  BP: ()/(0-91) 100/78     Weight: 76.4 kg (168 lb 6.9 oz)  Body mass index is 22.22 kg/m².       Physical Exam  Vitals and nursing note reviewed.   Constitutional:       General: He is not in acute distress.  Cardiovascular:      Rate and Rhythm: Normal rate.      Comments: LVAD hum  Pulmonary:      Effort: Pulmonary effort is normal.   Abdominal:      Tenderness: There is no abdominal tenderness.   Genitourinary:     Comments: Chronic diarrhea  Musculoskeletal:         General: Normal range of motion.      Cervical back: Normal range of motion.   Neurological:      Mental Status: He is alert and oriented to person, place, and time.   Psychiatric:         Mood and Affect: Mood normal.         Behavior: Behavior normal.         Thought Content: Thought content normal.         Judgment: Judgment normal.          Review of Symptoms      Symptom Assessment (ESAS 0-10 Scale)  Pain:  0  Dyspnea:  0  Anxiety:  0  Nausea:  0  Depression:  0  Anorexia:  0  Fatigue:  0  Insomnia:  0  Restlessness:  0  Agitation:  0     CAM / Delirium:  Negative  Constipation:  Negative  Diarrhea:  Positive      Bowel Management Plan (BMP):  Yes      Pain Assessment:  OME in 24 hours:  0  Location(s):      Performance Status:  70    Living Arrangements:  Lives with spouse    Psychosocial/Cultural:   See  Palliative Psychosocial Note: Yes  , lives with wife, has adult children and several grandchildren. Worked a  until heart failure disability,  enjoys time with grandchildren.    **Primary  to Follow**  Palliative Care  Consult: Yes    Spiritual:  F - Mavis and Belief:  Mandaeism   I - Importance:  Still attends [employer] services regularly    is the patient's brother-in-law  C - Community:  Not actively involved in community   A - Address in Care:   services offered.            Advance Care Planning            Significant Labs: All pertinent labs within the past 24 hours have been reviewed.  CBC:   Recent Labs   Lab 07/22/25 0339   WBC 4.45   HGB 9.7*   HCT 30.5*   MCV 91   *     BMP:  Recent Labs   Lab 07/22/25 0339   GLU 72   *   K 3.3*      CO2 23   BUN 18   CREATININE 3.7*   CALCIUM 7.3*   MG 1.7     LFT:  Lab Results   Component Value Date    AST 98 (H) 07/22/2025    ALKPHOS 114 07/22/2025    BILITOT 0.5 07/22/2025     Albumin:   Albumin   Date Value Ref Range Status   07/22/2025 2.0 (L) 3.5 - 5.2 g/dL Final   03/20/2025 2.5 (L) 3.5 - 5.2 g/dL Final     Protein:   Protein Total   Date Value Ref Range Status   07/22/2025 5.3 (L) 6.0 - 8.4 gm/dL Final     Total Protein   Date Value Ref Range Status   03/20/2025 8.0 6.0 - 8.4 g/dL Final     Lactic acid:   Lab Results   Component Value Date    LACTATE 0.8 07/21/2025    LACTATE 1.4 07/11/2025    LACTATE 1.5 07/11/2025       Significant Imaging: I have reviewed all pertinent imaging results/findings within the past 24 hours.

## 2025-07-22 NOTE — ASSESSMENT & PLAN NOTE
CT head 07/21/2025: Negative for any acute intracranial abnormality  Continue Coumadin  PT/OT  We will consider neurology evaluation

## 2025-07-22 NOTE — ASSESSMENT & PLAN NOTE
-Telemetry reveals heart rate less than 100  -Continue amiodarone 400 mg oral daily for rhythm control  -Continue warfarin. INR therapeutic. Goal INR 2-3  -Electrolyte management per Nephrology

## 2025-07-23 LAB
ABSOLUTE EOSINOPHIL (OHS): 0.06 K/UL
ABSOLUTE MONOCYTE (OHS): 0.28 K/UL (ref 0.3–1)
ABSOLUTE NEUTROPHIL COUNT (OHS): 3.31 K/UL (ref 1.8–7.7)
ALBUMIN SERPL BCP-MCNC: 2.1 G/DL (ref 3.5–5.2)
ALP SERPL-CCNC: 113 UNIT/L (ref 40–150)
ALT SERPL W/O P-5'-P-CCNC: 69 UNIT/L (ref 10–44)
APTT PPP: 36.7 SECONDS (ref 21–32)
AST SERPL-CCNC: 91 UNIT/L (ref 11–45)
BASOPHILS # BLD AUTO: 0.03 K/UL
BASOPHILS NFR BLD AUTO: 0.7 %
BILIRUB DIRECT SERPL-MCNC: 0.3 MG/DL (ref 0.1–0.3)
BILIRUB SERPL-MCNC: 0.5 MG/DL (ref 0.1–1)
BNP SERPL-MCNC: 1415 PG/ML (ref 0–99)
CRP SERPL-MCNC: 92.9 MG/L
ERYTHROCYTE [DISTWIDTH] IN BLOOD BY AUTOMATED COUNT: 19.2 % (ref 11.5–14.5)
HCT VFR BLD AUTO: 33 % (ref 40–54)
HGB BLD-MCNC: 10.4 GM/DL (ref 14–18)
IMM GRANULOCYTES # BLD AUTO: 0.02 K/UL (ref 0–0.04)
IMM GRANULOCYTES NFR BLD AUTO: 0.5 % (ref 0–0.5)
INR PPP: 1.9 (ref 0.8–1.2)
LYMPHOCYTES # BLD AUTO: 0.61 K/UL (ref 1–4.8)
MCH RBC QN AUTO: 28.5 PG (ref 27–31)
MCHC RBC AUTO-ENTMCNC: 31.5 G/DL (ref 32–36)
MCV RBC AUTO: 90 FL (ref 82–98)
NUCLEATED RBC (/100WBC) (OHS): 0 /100 WBC
PLATELET # BLD AUTO: 143 K/UL (ref 150–450)
PMV BLD AUTO: 10.2 FL (ref 9.2–12.9)
POCT GLUCOSE: 116 MG/DL (ref 70–110)
POCT GLUCOSE: 126 MG/DL (ref 70–110)
POCT GLUCOSE: 65 MG/DL (ref 70–110)
POCT GLUCOSE: 76 MG/DL (ref 70–110)
POCT GLUCOSE: 76 MG/DL (ref 70–110)
PREALB SERPL-MCNC: 11 MG/DL (ref 20–43)
PROT SERPL-MCNC: 5.6 GM/DL (ref 6–8.4)
PROTHROMBIN TIME: 20 SECONDS (ref 9–12.5)
RBC # BLD AUTO: 3.65 M/UL (ref 4.6–6.2)
RELATIVE EOSINOPHIL (OHS): 1.4 %
RELATIVE LYMPHOCYTE (OHS): 14.2 % (ref 18–48)
RELATIVE MONOCYTE (OHS): 6.5 % (ref 4–15)
RELATIVE NEUTROPHIL (OHS): 76.7 % (ref 38–73)
T4 FREE SERPL-MCNC: 1.06 NG/DL (ref 0.71–1.51)
TSH SERPL-ACNC: 1.27 UIU/ML (ref 0.4–4)
WBC # BLD AUTO: 4.31 K/UL (ref 3.9–12.7)

## 2025-07-23 PROCEDURE — G0378 HOSPITAL OBSERVATION PER HR: HCPCS

## 2025-07-23 PROCEDURE — 25000003 PHARM REV CODE 250: Performed by: INTERNAL MEDICINE

## 2025-07-23 PROCEDURE — 84443 ASSAY THYROID STIM HORMONE: CPT | Performed by: PHYSICIAN ASSISTANT

## 2025-07-23 PROCEDURE — 85730 THROMBOPLASTIN TIME PARTIAL: CPT | Performed by: STUDENT IN AN ORGANIZED HEALTH CARE EDUCATION/TRAINING PROGRAM

## 2025-07-23 PROCEDURE — 94761 N-INVAS EAR/PLS OXIMETRY MLT: CPT

## 2025-07-23 PROCEDURE — 99214 OFFICE O/P EST MOD 30 MIN: CPT | Mod: ,,, | Performed by: NURSE PRACTITIONER

## 2025-07-23 PROCEDURE — 84439 ASSAY OF FREE THYROXINE: CPT | Performed by: PHYSICIAN ASSISTANT

## 2025-07-23 PROCEDURE — 97161 PT EVAL LOW COMPLEX 20 MIN: CPT

## 2025-07-23 PROCEDURE — 84134 ASSAY OF PREALBUMIN: CPT | Performed by: STUDENT IN AN ORGANIZED HEALTH CARE EDUCATION/TRAINING PROGRAM

## 2025-07-23 PROCEDURE — 36415 COLL VENOUS BLD VENIPUNCTURE: CPT | Performed by: STUDENT IN AN ORGANIZED HEALTH CARE EDUCATION/TRAINING PROGRAM

## 2025-07-23 PROCEDURE — 80076 HEPATIC FUNCTION PANEL: CPT | Performed by: STUDENT IN AN ORGANIZED HEALTH CARE EDUCATION/TRAINING PROGRAM

## 2025-07-23 PROCEDURE — G0257 UNSCHED DIALYSIS ESRD PT HOS: HCPCS

## 2025-07-23 PROCEDURE — 25000003 PHARM REV CODE 250

## 2025-07-23 PROCEDURE — 83880 ASSAY OF NATRIURETIC PEPTIDE: CPT | Performed by: STUDENT IN AN ORGANIZED HEALTH CARE EDUCATION/TRAINING PROGRAM

## 2025-07-23 PROCEDURE — 80100014 HC HEMODIALYSIS 1:1

## 2025-07-23 PROCEDURE — 85025 COMPLETE CBC W/AUTO DIFF WBC: CPT | Performed by: STUDENT IN AN ORGANIZED HEALTH CARE EDUCATION/TRAINING PROGRAM

## 2025-07-23 PROCEDURE — 63600175 PHARM REV CODE 636 W HCPCS

## 2025-07-23 PROCEDURE — 86140 C-REACTIVE PROTEIN: CPT | Performed by: STUDENT IN AN ORGANIZED HEALTH CARE EDUCATION/TRAINING PROGRAM

## 2025-07-23 PROCEDURE — 85610 PROTHROMBIN TIME: CPT | Performed by: STUDENT IN AN ORGANIZED HEALTH CARE EDUCATION/TRAINING PROGRAM

## 2025-07-23 PROCEDURE — 27000248 HC VAD-ADDITIONAL DAY

## 2025-07-23 PROCEDURE — 25000003 PHARM REV CODE 250: Performed by: PHYSICIAN ASSISTANT

## 2025-07-23 PROCEDURE — 97530 THERAPEUTIC ACTIVITIES: CPT

## 2025-07-23 PROCEDURE — 25000003 PHARM REV CODE 250: Performed by: STUDENT IN AN ORGANIZED HEALTH CARE EDUCATION/TRAINING PROGRAM

## 2025-07-23 RX ORDER — WARFARIN 1 MG/1
1 TABLET ORAL DAILY
Status: DISCONTINUED | OUTPATIENT
Start: 2025-07-23 | End: 2025-07-25 | Stop reason: HOSPADM

## 2025-07-23 RX ORDER — CHOLESTYRAMINE 4 G/9G
1 POWDER, FOR SUSPENSION ORAL 2 TIMES DAILY
Qty: 60 PACKET | Refills: 1 | Status: SHIPPED | OUTPATIENT
Start: 2025-07-23

## 2025-07-23 RX ORDER — WARFARIN 1 MG/1
1 TABLET ORAL DAILY
Start: 2025-07-23

## 2025-07-23 RX ADMIN — CHOLESTYRAMINE 4 G: 4 POWDER, FOR SUSPENSION ORAL at 09:07

## 2025-07-23 RX ADMIN — TRAZODONE HYDROCHLORIDE 25 MG: 50 TABLET ORAL at 09:07

## 2025-07-23 RX ADMIN — HYDRALAZINE HYDROCHLORIDE 75 MG: 25 TABLET ORAL at 04:07

## 2025-07-23 RX ADMIN — HYDRALAZINE HYDROCHLORIDE 75 MG: 25 TABLET ORAL at 09:07

## 2025-07-23 RX ADMIN — AMIODARONE HYDROCHLORIDE 400 MG: 200 TABLET ORAL at 09:07

## 2025-07-23 RX ADMIN — MUPIROCIN: 20 OINTMENT TOPICAL at 09:07

## 2025-07-23 RX ADMIN — DRONABINOL 2.5 MG: 2.5 CAPSULE ORAL at 09:07

## 2025-07-23 RX ADMIN — Medication 1 CAPSULE: at 09:07

## 2025-07-23 RX ADMIN — VENLAFAXINE 37.5 MG: 37.5 TABLET ORAL at 09:07

## 2025-07-23 RX ADMIN — HYDRALAZINE HYDROCHLORIDE 75 MG: 25 TABLET ORAL at 02:07

## 2025-07-23 RX ADMIN — WARFARIN SODIUM 1 MG: 1 TABLET ORAL at 05:07

## 2025-07-23 NOTE — PROGRESS NOTES
07/23/25 1500   Vital Signs   BP (!) 78/0  (with doppler)   BP Method Doppler   Pre-Hemodialysis Assessment   Treatment Status Completed   During Hemodialysis Assessment   Blood Flow Rate (mL/min) 400 mL/min   Dialysate Flow Rate (mL/min) 800 ml/min   Ultrafiltration Rate (mL/Hr) 330 mL/Hr   Arteriovenous Lines Secure Yes   Arterial Pressure (mmHg) 190 mmHg   Venous Pressure (mmHg) 140   Blood Volume Processed (Liters) 67 L   UF Removed (mL) 1000 mL   TMP 30   Venous Line in Air Detector Yes   Intake (mL) 250 mL   Intra-Hemodialysis Comments hd completed   Post-Hemodialysis Assessment   Rinseback Volume (mL) 250 mL   Blood Volume Processed (Liters) 67 L   Dialyzer Clearance Clear   Duration of Treatment 3 minutes  (hours)   Total UF (mL) 100 mL   Net Fluid Removal 500   Patient Response to Treatment tolerated     Hd completed, pt awake and alert, hep locked, dressing intact, report given to primary nurse

## 2025-07-23 NOTE — SUBJECTIVE & OBJECTIVE
Interval History:  No acute events noted overnight.  Patient is easily arousable, and alert but does not want to participate in exam this a.m..  He denies CP, SOB, dizziness, lightheadedness, palpitations.  Per the patient's wife, patient did have formed stools after taking Questran, but still had frequent BMs.  Palliative Medicine will follow outpatient. PT/OT recommended the patient would benefit from acute skilled OT services outpatient.    Continuous Infusions:  Scheduled Meds:   amiodarone  400 mg Oral Daily    cholestyramine  1 packet Oral BID    droNABinol  2.5 mg Oral BID    hydrALAZINE  75 mg Oral Q8H    Lactobacillus rhamnosus GG  1 capsule Oral Daily    mupirocin   Nasal BID    venlafaxine  37.5 mg Oral Daily    warfarin  1 mg Oral Daily     PRN Meds:  Current Facility-Administered Medications:     dextrose 50%, 12.5 g, Intravenous, PRN    dextrose 50%, 25 g, Intravenous, PRN    diphenoxylate-atropine 2.5-0.025 mg, 2 tablet, Oral, QID PRN    glucagon (human recombinant), 1 mg, Intramuscular, PRN    glucose, 16 g, Oral, PRN    glucose, 24 g, Oral, PRN    insulin aspart U-100, 0-5 Units, Subcutaneous, QID (AC + HS) PRN    traZODone, 25 mg, Oral, Nightly PRN    Review of patient's allergies indicates:  No Known Allergies  Objective:     Vital Signs (Most Recent):  Temp: 97.5 °F (36.4 °C) (07/23/25 1133)  Pulse: 87 (07/23/25 1133)  Resp: 18 (07/23/25 0731)  BP: (!) 88/0 (07/23/25 0710)  SpO2: 99 % (07/23/25 1133) Vital Signs (24h Range):  Temp:  [97.5 °F (36.4 °C)-99.4 °F (37.4 °C)] 97.5 °F (36.4 °C)  Pulse:  [] 87  Resp:  [18] 18  SpO2:  [95 %-99 %] 99 %  BP: ()/(0-80) 88/0     Patient Vitals for the past 72 hrs (Last 3 readings):   Weight   07/23/25 0500 77.9 kg (171 lb 11.8 oz)   07/22/25 0517 76.4 kg (168 lb 6.9 oz)     Body mass index is 22.66 kg/m².      Intake/Output Summary (Last 24 hours) at 7/23/2025 1201  Last data filed at 7/23/2025 0500  Gross per 24 hour   Intake 600 ml   Output --    Net 600 ml       Hemodynamic Parameters:            Physical Exam  Constitutional:       Appearance: Normal appearance.   HENT:      Mouth/Throat:      Mouth: Mucous membranes are moist.   Eyes:      Pupils: Pupils are equal, round, and reactive to light.   Cardiovascular:      Rate and Rhythm: Normal rate.      Heart sounds: No murmur heard.     No friction rub. No gallop.   Abdominal:      General: There is no distension.      Palpations: Abdomen is soft.      Tenderness: There is no abdominal tenderness. There is no guarding.   Musculoskeletal:      Right lower leg: No edema.      Left lower leg: No edema.   Skin:     General: Skin is warm.      Capillary Refill: Capillary refill takes less than 2 seconds.   Neurological:      General: No focal deficit present.      Mental Status: He is oriented to person, place, and time and easily aroused. He is lethargic.   Psychiatric:         Behavior: Behavior is withdrawn.            Significant Labs:  CBC:  Recent Labs   Lab 07/21/25 1547 07/22/25 0339 07/23/25 0413   WBC 4.21 4.45 4.31   RBC 3.67* 3.36* 3.65*   HGB 10.6* 9.7* 10.4*   HCT 33.3* 30.5* 33.0*   * 133* 143*   MCV 91 91 90   MCH 28.9 28.9 28.5   MCHC 31.8* 31.8* 31.5*     BNP:  Recent Labs   Lab 07/23/25 0413   BNP 1,415*     CMP:  Recent Labs   Lab 07/21/25 1547 07/22/25 0339 07/23/25 0413   GLU 81 72  --    CALCIUM 7.7* 7.3*  --    ALBUMIN 2.2* 2.0* 2.1*   PROT 6.1 5.3* 5.6*   * 135*  --    K 3.2* 3.3*  --    CO2 26 23  --     105  --    BUN 12 18  --    CREATININE 3.2* 3.7*  --    ALKPHOS 119 114 113   ALT 80* 69* 69*   * 98* 91*   BILITOT 0.6 0.5 0.5      Coagulation:   Recent Labs   Lab 07/21/25 1547 07/22/25 0339 07/23/25 0413   INR 2.0* 2.0* 1.9*   APTT 43.0* 41.7* 36.7*     LDH:  Recent Labs   Lab 07/22/25  0339   *     Microbiology:  Microbiology Results (last 7 days)       Procedure Component Value Units Date/Time    Blood culture [[id number]]  (Normal)  Collected: 07/22/25 1049    Order Status: Completed Specimen: Blood from Peripheral, Hand, Left Updated: 07/22/25 1902     Blood Culture No Growth After 6 Hours    Blood culture [[id number]]  (Normal) Collected: 07/22/25 1049    Order Status: Completed Specimen: Blood from Peripheral, Lower Arm, Left Updated: 07/22/25 1902     Blood Culture No Growth After 6 Hours            I have reviewed all pertinent labs within the past 24 hours.    Estimated Creatinine Clearance: 22.5 mL/min (A) (based on SCr of 3.7 mg/dL (H)).    Diagnostic Results:  I have reviewed all pertinent imaging results/findings within the past 24 hours.

## 2025-07-23 NOTE — ASSESSMENT & PLAN NOTE
-HFrEF stage D s/p  3 (01/2023). TTE 04/2025 reveals LV EF 10-15%, LVAD 5000 rpm, LVIDd 5.5 cm, trace TR  -Continue hydralazine 75 mg every 8 hours  -Diuresis:  Fluid management per Nephrology.  Patient on HD (m,W,F)  -Continue to monitor fluid status, strict I's and O, daily weight

## 2025-07-23 NOTE — PLAN OF CARE
Recommendations     Interventions: General healthful diet, Protein modified diet, Fat modified diet, Cholesterol modified diet, Medical food supplement therapy, Vitamin and mineral supplement therapy, Collaboration and referral of nutrition care     Recommendations:   1. Continue cardiac diet as tolerated      - fluid per MD    - please continue to document PO % intake via flowsheets     2. Continue novasource renal TID    3. Encourage good intake    4. RD to monitor weight, labs, meds, intake, tolerance    5. Recommend MVI    6. Recommend appetite stimulant     Goal #1: PO intake will meet greater than or equal to 75% of estimated needs by RD follow up  Nutrition Goal Status #1: new  Goal #2: Maintain weight throughout hospitalization  Nutrition Goal Status #2: new  Communication of RD Recs:  (POC)     Nutrition Discharge Planning     Nutrition Discharge Planning: General healthy diet, Therapeutic diet (comments), Oral supplement regimen (comments)  Therapeutic diet (comments): renal diet  Oral supplement regimen (comments): oral nutrition supplement of choice and MVI

## 2025-07-23 NOTE — PROGRESS NOTES
[facility] - Cardiology Stepdown  Heart Transplant  Progress Note    Patient Name: Radha Abbott  MRN: [id number]  Admission Date: 7/21/2025  Hospital Length of Stay: 0 days  Attending Physician: Walter Mcintyre MD  Primary Care Provider: Vasu Kong MD  Principal Problem:Failure to thrive in adult    Subjective:   Interval History:  No acute events noted overnight.  Patient is easily arousable, and alert but does not want to participate in exam this a.m..  He denies CP, SOB, dizziness, lightheadedness, palpitations.  Per the patient's wife, patient did have formed stools after taking Questran, but still had frequent BMs.  Palliative Medicine will follow outpatient. PT/OT recommended the patient would benefit from acute skilled OT services outpatient.    Continuous Infusions:  Scheduled Meds:   amiodarone  400 mg Oral Daily    cholestyramine  1 packet Oral BID    droNABinol  2.5 mg Oral BID    hydrALAZINE  75 mg Oral Q8H    Lactobacillus rhamnosus GG  1 capsule Oral Daily    mupirocin   Nasal BID    venlafaxine  37.5 mg Oral Daily    warfarin  1 mg Oral Daily     PRN Meds:  Current Facility-Administered Medications:     dextrose 50%, 12.5 g, Intravenous, PRN    dextrose 50%, 25 g, Intravenous, PRN    diphenoxylate-atropine 2.5-0.025 mg, 2 tablet, Oral, QID PRN    glucagon (human recombinant), 1 mg, Intramuscular, PRN    glucose, 16 g, Oral, PRN    glucose, 24 g, Oral, PRN    insulin aspart U-100, 0-5 Units, Subcutaneous, QID (AC + HS) PRN    traZODone, 25 mg, Oral, Nightly PRN    Review of patient's allergies indicates:  No Known Allergies  Objective:     Vital Signs (Most Recent):  Temp: 97.5 °F (36.4 °C) (07/23/25 1133)  Pulse: 87 (07/23/25 1133)  Resp: 18 (07/23/25 0731)  BP: (!) 88/0 (07/23/25 0710)  SpO2: 99 % (07/23/25 1133) Vital Signs (24h Range):  Temp:  [97.5 °F (36.4 °C)-99.4 °F (37.4 °C)] 97.5 °F (36.4 °C)  Pulse:  [] 87  Resp:  [18] 18  SpO2:  [95 %-99 %] 99 %  BP: ()/(0-80) 88/0      Patient Vitals for the past 72 hrs (Last 3 readings):   Weight   07/23/25 0500 77.9 kg (171 lb 11.8 oz)   07/22/25 0517 76.4 kg (168 lb 6.9 oz)     Body mass index is 22.66 kg/m².      Intake/Output Summary (Last 24 hours) at 7/23/2025 1201  Last data filed at 7/23/2025 0500  Gross per 24 hour   Intake 600 ml   Output --   Net 600 ml       Hemodynamic Parameters:            Physical Exam  Constitutional:       Appearance: Normal appearance.   HENT:      Mouth/Throat:      Mouth: Mucous membranes are moist.   Eyes:      Pupils: Pupils are equal, round, and reactive to light.   Cardiovascular:      Rate and Rhythm: Normal rate.      Heart sounds: No murmur heard.     No friction rub. No gallop.   Abdominal:      General: There is no distension.      Palpations: Abdomen is soft.      Tenderness: There is no abdominal tenderness. There is no guarding.   Musculoskeletal:      Right lower leg: No edema.      Left lower leg: No edema.   Skin:     General: Skin is warm.      Capillary Refill: Capillary refill takes less than 2 seconds.   Neurological:      General: No focal deficit present.      Mental Status: He is oriented to person, place, and time and easily aroused. He is lethargic.   Psychiatric:         Behavior: Behavior is withdrawn.            Significant Labs:  CBC:  Recent Labs   Lab 07/21/25  1547 07/22/25  0339 07/23/25 0413   WBC 4.21 4.45 4.31   RBC 3.67* 3.36* 3.65*   HGB 10.6* 9.7* 10.4*   HCT 33.3* 30.5* 33.0*   * 133* 143*   MCV 91 91 90   MCH 28.9 28.9 28.5   MCHC 31.8* 31.8* 31.5*     BNP:  Recent Labs   Lab 07/23/25  0413   BNP 1,415*     CMP:  Recent Labs   Lab 07/21/25  1547 07/22/25  0339 07/23/25 0413   GLU 81 72  --    CALCIUM 7.7* 7.3*  --    ALBUMIN 2.2* 2.0* 2.1*   PROT 6.1 5.3* 5.6*   * 135*  --    K 3.2* 3.3*  --    CO2 26 23  --     105  --    BUN 12 18  --    CREATININE 3.2* 3.7*  --    ALKPHOS 119 114 113   ALT 80* 69* 69*   * 98* 91*   BILITOT 0.6 0.5 0.5       Coagulation:   Recent Labs   Lab 07/21/25  1547 07/22/25  0339 07/23/25  0413   INR 2.0* 2.0* 1.9*   APTT 43.0* 41.7* 36.7*     LDH:  Recent Labs   Lab 07/22/25  0339   *     Microbiology:  Microbiology Results (last 7 days)       Procedure Component Value Units Date/Time    Blood culture [[id number]]  (Normal) Collected: 07/22/25 1049    Order Status: Completed Specimen: Blood from Peripheral, Hand, Left Updated: 07/22/25 1902     Blood Culture No Growth After 6 Hours    Blood culture [[id number]]  (Normal) Collected: 07/22/25 1049    Order Status: Completed Specimen: Blood from Peripheral, Lower Arm, Left Updated: 07/22/25 1902     Blood Culture No Growth After 6 Hours            I have reviewed all pertinent labs within the past 24 hours.    Estimated Creatinine Clearance: 22.5 mL/min (A) (based on SCr of 3.7 mg/dL (H)).    Diagnostic Results:  I have reviewed all pertinent imaging results/findings within the past 24 hours.  Assessment and Plan:     No notes on file    * Failure to thrive in adult  -Repeated admissions for BLE weakness, diarrhea.  Patient is still having issues with decrease in appetite, with appetite stimulant prescribed at home.  Patient also having continued diarrhea since being admitted for C diff infection 5/9.  Patient placed on probiotics Lomotil to help slow down diarrhea.  -Palliative care consulted for [facility] conversation  -continue Megace outpatient for appetite stimulation  -Continue Questran 4 g p.o. b.i.d. to help with diarrhea.  -Continue  Nepro to increased caloric intake  -See PT/OT note for mobility and functional living recommendations    Chronic combined systolic and diastolic CHF, NYHA class 4  -HFrEF stage D s/p HM 3 (01/2023). TTE 04/2025 reveals LV EF 10-15%, LVAD 5000 rpm, LVIDd 5.5 cm, trace TR  -Continue hydralazine 75 mg every 8 hours  -Diuresis:  Fluid management per Nephrology.  Patient on HD (m,W,F)  -Continue to monitor fluid status, strict I's and O,  daily weight    LVAD (left ventricular assist device) present  Procedure: Device Interrogation Including analysis of device parameters  Current Settings: Ventricular Assist Device  Review of device function is stable/unstable stable        7/23/2025     7:10 AM 7/23/2025     4:34 AM 7/22/2025    11:40 PM 7/22/2025     8:17 PM 7/22/2025     4:00 PM 7/22/2025    12:00 PM 7/22/2025     8:00 AM   TXP LVAD INTERROGATIONS   Type HeartMate3 HeartMate3 HeartMate3 HeartMate3 HeartMate3 HeartMate3 HeartMate3   Flow 3.3 3.2 3.1 3 3.3 3.2 3.4   Speed 5000 5050 5000 5000 5000 5000 5000   PI 7.4 7.3 7.8 8.1 3.9 6.9 6.6   Power (Carrington) 3.5 3.5 3.5 3.4 3.4 3.3 3.4   LSL 4600 4600 4600 4600 4600 4600 4600   Low Flow Alarm no no   no no no   Pulsatility  Pulse Pulse Pulse Pulse Pulse Intermittent pulse         PAF (paroxysmal atrial fibrillation)  -Telemetry reveals heart rate less than 100  -Continue amiodarone 400 mg oral daily for rhythm control  -Continue warfarin. INR therapeutic. Goal INR 2-3  -Electrolyte management per Nephrology    ESRD (end stage renal disease)  -On HD (M, W, F).  Kurt cath, right subclavian artery  -Nephrology consulted for renal function management        Frank Green, JUAN-BC  Heart Transplant  [facility] - Cardiology Stepdown

## 2025-07-23 NOTE — PROGRESS NOTES
07/23/25 1200   Vital Signs   BP   (80 with doppler)   BP Method Doppler   Assessments (Pre/Post)   Level of Consciousness (AVPU) alert   Pre-Hemodialysis Assessment   Treatment Status Started   Gross Bleach Negative Yes   Machine Number [id number]   Alarms Verified Yes   pH 7.2   Machine Temperature 95.4 °F (35.2 °C)   Dialyzer F-180   Machine Conductivity 13.8   Meter Conductivity 13.8   Dialysate Na (mEq/L) 138   Dialysate K (mEq/L) 4   Dialysate CA (mEq/L) 3   Dialysate HCO3 (mEq/L) 35   Net UF Goal 500   Total UF Goal 1000   UF Rate 330   RO Rejection Within Limits? Yes        Hemodialysis Catheter 06/24/24 1440 right internal jugular   Placement Date/Time: 06/24/24 1440   Present Prior to Hospital Arrival?: No  Hand Hygiene: Performed  Barrier Precautions: Performed  Skin Antisepsis: ChloraPrep  Hemodialysis Catheter Type: Tunneled catheter  Location: right internal jugular  Cathete...   Line Necessity Review CRRT/HD   Site Assessment No drainage;No redness;No swelling   Line Securement Device Antimicrobial Adhesive   Dressing Type CHG impregnated dressing/sponge   Dressing Status Clean;Dry;Intact   Dressing Intervention Integrity maintained   Date on Dressing 07/23/25   Dressing Due to be Changed 07/30/25   Venous Patency/Care accessed;flushed w/o difficulty   Arterial Patency/Care accessed;flushed w/o difficulty   During Hemodialysis Assessment   Blood Flow Rate (mL/min) 400 mL/min   Dialysate Flow Rate (mL/min) 800 ml/min   Ultrafiltration Rate (mL/Hr) 330 mL/Hr   Arteriovenous Lines Secure Yes   Arterial Pressure (mmHg) 160 mmHg   Venous Pressure (mmHg) 110   TMP 20   Venous Line in Air Detector Yes   Intake (mL) 250 mL   Intra-Hemodialysis Comments hd intiated     Hd initiated per md orders, access via left tunneled cvc, flushed well, lines secured

## 2025-07-23 NOTE — PROGRESS NOTES
07/23/2025  Alexus Ya    Current provider:  Walter Mcintyre MD    Device interrogation:      7/23/2025     7:10 AM 7/23/2025     4:34 AM 7/22/2025    11:40 PM 7/22/2025     8:17 PM 7/22/2025     4:00 PM 7/22/2025    12:00 PM 7/22/2025     8:00 AM   TXP LVAD INTERROGATIONS   Type HeartMate3 HeartMate3 HeartMate3 HeartMate3 HeartMate3 HeartMate3 HeartMate3   Flow 3.3 3.2 3.1 3 3.3 3.2 3.4   Speed 5000 5050 5000 5000 5000 5000 5000   PI 7.4 7.3 7.8 8.1 3.9 6.9 6.6   Power (Carrington) 3.5 3.5 3.5 3.4 3.4 3.3 3.4   LSL 4600 4600 4600 4600 4600 4600 4600   Low Flow Alarm no no   no no no   Pulsatility  Pulse Pulse Pulse Pulse Pulse Intermittent pulse          Rounded on [facility] to ensure all mechanical assist device settings (IABP or VAD) were appropriate and all parameters were within limits.  I was able to ensure all back up equipment was present, the staff had no issues, and the Perfusion Department daily rounding was complete.      For implantable VADs: Interrogation of Ventricular assist device was performed with analysis of device parameters and review of device function. I have personally reviewed the interrogation findings and agree with findings as stated.     In emergency, the nursing units have been notified to contact the perfusion department either by:  Calling [id number] from 630am to 4pm Mon thru Fri, utilizing the On-Call Finder functionality of Epic and searching for Perfusion, or by contacting the hospital  from 4pm to 630am and on weekends and asking to speak with the perfusionist on call.    1:40 PM

## 2025-07-23 NOTE — CONSULTS
[facility] - Cardiology Stepdown    Wound Care     Patient Name:  Radha Abbott  MRN:  [id number]  Date: 2025  Diagnosis: Failure to thrive in adult     History:  Past Medical History:   Diagnosis Date    Aspiration pneumonia of both lungs 2024    CAD (coronary artery disease)     CHF (congestive heart failure)     Delirium 2024    Diabetes mellitus     HFrEF (heart failure with reduced ejection fraction)     Hypoglycemia 2024    ICD (implantable cardioverter-defibrillator) in place     LVAD (left ventricular assist device) present 2023    MI, old     PAF (paroxysmal atrial fibrillation) 10/11/2023    Stage 4 chronic kidney disease 02/15/2024    Type 2 diabetes mellitus without complication, without long-term current use of insulin 10/07/2023     Social History[1]  Precautions:  Allergies as of 2025    (No Known Allergies)       [facility] Assessment Details / Treatment:    Patient seen for wound care: New Consult   Chart reviewed for this encounter.   Labs:   WBC (K/uL)   Date Value   2025 4.31   2025 4.45     Glucose (mg/dL)   Date Value   2025 72   2025 81   2025 148 (H)   2025 87     Albumin (g/dL)   Date Value   2025 2.1 (L)   2025 2.0 (L)   2025 2.5 (L)   03/10/2025 2.2 (L)       Gasper Score: 16    Narrative:  Pt seen for WC consultation and agreed to assessment. Pt awake and alert lying in bed. Family at bedside.   Turns with min assist.     Chart reviewed for this encounter.   See Flow Sheet for additional documentation and media.    Ann cleft: Cleansed with bath wipe. Wound bed pink and moist with partial thickness skin loss. Maceration to mildred wound. Wound likely caused by moisture. Triad applied.           RECOMMENDATIONS:  Bedside nurse assess for acute changes (purulence, increased redness/swelling, increased drainage, malodor, increased pain, pallor, necrosis) please contact physician on any acute changes.     cleft:  Cleanse with bath wipe. Apply triad BID and PRN soiling    -Turn Q2   -No sacral foams    -Waffle overlay     Bedside nursing to continue care, dressing changes, & continue monitoring.  Bedside nursing to maintain pressure injury prevention interventions, (PIP).     Recommendations made to primary team for above plan.    Thank you for the consult. Wound Care will continue to follow.         07/23/25 1030        Wound 07/22/25 0000 Moisture associated dermatitis Buttocks   Date First Assessed/Time First Assessed: 07/22/25 0000   Present on Original Admission: Yes  Primary Wound Type: Moisture associated dermatitis  Location: Buttocks  Is this injury device related?: No   Wound Image    Dressing Appearance Open to air   Drainage Amount Moderate   Drainage Characteristics/Odor Serosanguineous   Appearance Red;Pink;Moist   Tissue loss description Partial thickness   Periwound Area Macerated;Scar tissue   Care Cleansed with:;Other (see comments)  (bath wipe)   Dressing Applied;Other (comment)  (triad)                          [1]   Social History  Socioeconomic History    Marital status:    Tobacco Use    Smoking status: Former     Current packs/day: 0.50     Types: Cigarettes   Vaping Use    Vaping status: Never Used   Substance and Sexual Activity    Alcohol use: Yes     Comment: rarely    Drug use: No    Sexual activity: Not Currently     Partners: Female     Social Drivers of Health     Financial Resource Strain: Low Risk  (7/12/2025)    Overall Financial Resource Strain (CARDIA)     Difficulty of Paying Living Expenses: Not hard at all   Food Insecurity: No Food Insecurity (7/12/2025)    Hunger Vital Sign     Worried About Running Out of Food in the Last Year: Never true     Ran Out of Food in the Last Year: Never true   Transportation Needs: No Transportation Needs (7/12/2025)    PRAPARE - Transportation     Lack of Transportation (Medical): No     Lack of Transportation (Non-Medical): No   Recent Concern:  Transportation Needs - Unmet Transportation Needs (5/24/2025)    PRAPARE - Transportation     Lack of Transportation (Medical): No     Lack of Transportation (Non-Medical): Yes   Physical Activity: Unknown (2/29/2024)    Exercise Vital Sign     Days of Exercise per Week: 2 days   Recent Concern: Physical Activity - Inactive (2/29/2024)    Exercise Vital Sign     Days of Exercise per Week: 2 days     Minutes of Exercise per Session: 0 min   Stress: Stress Concern Present (7/12/2025)    [place] [employer] - Occupational Stress Questionnaire     Feeling of Stress : To some extent   Housing Stability: Low Risk  (7/12/2025)    Housing Stability Vital Sign     Unable to Pay for Housing in the Last Year: No     Homeless in the Last Year: No

## 2025-07-23 NOTE — PLAN OF CARE
[facility]   Heart Transplant/VAD Clinic   [address] [place] [address]    [phone] after hours          [phone] fax     VAD HOME  HEALTH ORDERS      Admit to Home Health    Diagnosis: Problem List[1]    Patient is homebound due to:  NYHA Class IV HF. S/P LVAD placement.     Diet: Low Fat, Low cholesterol, 2Gm Na, Coumadin restrictions.    Acitivities: No Swimming, bathing, vacuuming, contact sports.    Fresh implants= Sternal Precautions    Nursing:   SN to complete comprehensive assessment including routine vital signs. Instruct on disease process and s/s of complications to report to MD. Review/verify medication list sent home with the patient at time of discharge  and instruct patient/caregiver as needed. Frequency may be adjusted depending on start of care date.    **LVAD driveline exit site dressing change is to be completed per LVAD patient/caregiver only**.    Notify MD if:  SBP > 120 or < 80;   MAP > 90 or < 70;   HR > 120 or < 60;   Temp > 101;   Weight gain >3lbs in 1 day or 5lbs in 1 week.    LABS:  SN to perform labs: PT/INR per Coumadin clinic [phone].   Weekly bmp, mag, cbc.     Follow up INR date: Per coumadin clinic  No Finger Sticks    CONSULTS:      Physical Therapy to evaluate and treat. Evaluate for home safety and equipment needs; Establish/upgrade home exercise program. Perform / instruct on therapeutic exercises, gait training, transfer training, and Range of Motion.    Occupational Therapy to evaluate and treat. Evaluate home environment for safety and equipment needs. Perform/Instruct on transfers, ADL training, ROM, and therapeutic exercises.    **Transition to outpatient PT/OT once/if appropriate**      to evaluate for community resources/long-range planning.     Aide to provide assistance with personal care, ADLs, and vital signs    Send initial Home Health orders to HTS attending physician on call.  Send  follow up questions to VAD clinic MD [phone] or [phone].        [1]   Patient Active Problem List  Diagnosis    CAD (coronary artery disease)    Type 2 diabetes mellitus without complication, without long-term current use of insulin    PAF (paroxysmal atrial fibrillation)    Acute on chronic combined systolic and diastolic CHF, NYHA class 4    Defibrillator discharge    Palliative care encounter    Advance care planning    LVAD (left ventricular assist device) present    HFrEF (heart failure with reduced ejection fraction)    Depressed mood    Protein-calorie malnutrition, moderate    Dysphonia    Unilateral complete vocal fold paralysis    Adjustment disorder with depressed mood    Deep tissue injury    Blood blister    Lymphadenopathy    At high risk for skin breakdown    Parotid mass    Impaired mobility    ESRD (end stage renal disease)    At risk for amiodarone toxicity with long term use    Stage 4 chronic kidney disease    Headache    Hypertension    Weakness    Hyponatremia    Acute combined systolic and diastolic congestive heart failure    Anemia of chronic disease    Weakness of foot, right    Anticoagulant long-term use    Goals of care, counseling/discussion    Bacteremia due to Enterococcus    Renal failure syndrome    Chronic combined systolic and diastolic CHF, NYHA class 4    Chronic kidney disease-mineral and bone disorder    Dialysis patient    Diarrhea of presumed infectious origin    Clostridium difficile infection    Elevated troponin    History of warfarin therapy    Contusion of scalp    Fall    Left flank pain    Falls    Positive occult stool blood test    Decreased strength of upper extremity    Decreased coordination    Anemia    Clostridium difficile diarrhea    Decreased strength, endurance, and mobility    Left ventricular assist device (LVAD) complication, initial encounter    Failure to thrive in adult    Advanced care planning/counseling discussion

## 2025-07-23 NOTE — PT/OT/SLP PROGRESS
Occupational Therapy   Treatment    Name: Radha Abbott  MRN: [id number]  Admitting Diagnosis:  Failure to thrive in adult       Recommendations:     Discharge Recommendations: Moderate Intensity Therapy  Discharge Equipment Recommendations:  hospital bed, lift device  Barriers to discharge:  Other (Comment) (increased skilled A required)    Assessment:     Radha Abbott is a 65 y.o. male with a medical diagnosis of Failure to thrive in adult.  He presents with limited participation with presented therapeutic interventions today. Pt largely limited by impaired activity tolerance and impaired cognition. OOB activities deferred today 2/2 HD at bedside and pt with mild lethargy/fatigue. Pt's family at bedside and provided with appropriate caregiver education upon DC from the acute setting. Pt is currently not at her PLOF and would greatly benefit from continued skilled OT intervention in efforts to participate in meaningful occupations of choice at the highest level of independence.  Performance deficits affecting function are weakness, impaired endurance, impaired self care skills, impaired functional mobility, gait instability, impaired balance, impaired cognition, decreased coordination, impaired coordination, decreased upper extremity function, decreased lower extremity function, impaired fine motor, decreased safety awareness, impaired cardiopulmonary response to activity.     Rehab Prognosis:  Fair; patient would benefit from acute skilled OT services to address these deficits and reach maximum level of function.       Plan:     Patient to be seen 4 x/week to address the above listed problems via self-care/home management, therapeutic activities, therapeutic exercises, neuromuscular re-education, wheelchair management/training  Plan of Care Expires: 08/12/25  Plan of Care Reviewed with: patient, spouse    Subjective     Chief Complaint: none stated  Patient/Family Comments/goals: pt's family reporting increased  weakness over the last 3 weeks  Pain/Comfort:  Pain Rating 1: 0/10  Pain Rating Post-Intervention 1: 0/10    Objective:     Communicated with: RN prior to session.  Patient found HOB elevated with LVAD (HD) upon OT entry to room.    General Precautions: Standard, fall, LVAD    Orthopedic Precautions:N/A  Braces: N/A  Respiratory Status: Room air     Occupational Performance:     Bed Mobility:    Patient completed Scooting/Bridging with total assistance and 2 persons to HOB via draw sheet    Functional Mobility/Transfers:  Activity deferred 2/2 HD at bedside and pt with fatigue/increased lethargy     Activities of Daily Living:  Pt politely declined ADL needs today       [facility] 6 Click ADL: 14    Treatment & Education:  Pt educated on the following:  - role of OT and OT POC, including DC from OT. Pt verbalized understanding.  - importance of continued mobilization  - Safe transfer techniques and proper body mechanics for fall prevention and improved independence with functional transfers   - Importance of OOB activities to increase endurance and tolerance for increased participation in daily ADLs.    - All pt questions within OT scope of practice addressed, pt verbalized understanding.     Patient left supine with all lines intact, call button in reach, RN notified, and wife present    GOALS:   Multidisciplinary Problems       Occupational Therapy Goals          Problem: Occupational Therapy    Goal Priority Disciplines Outcome Interventions   Occupational Therapy Goal     OT, PT/OT Progressing    Description: Goals to be met by: 8/12/25     Patient will increase functional independence with ADLs by performing:    UE Dressing with Minimal Assistance.  LE Dressing with Moderate Assistance.  Grooming while bedside chair with Contact Guard Assistance.  Toileting from toilet with Maximum Assistance for hygiene and clothing management.   Supine to sit with Minimal Assistance.  Step transfer with Minimal Assistance  Toilet  transfer to toilet with Minimal Assistance.                         DME Justifications:  Radha requires a hospital bed due to him requiring positioning of the body in ways not feasible with an ordinary bed to alleviate pain and cannot independently make changes in body position without the use of the bed.The positioning of the body cannot be sufficiently resolved by the use of pillows and wedges.    Time Tracking:     OT Date of Treatment: 07/23/25  OT Start Time: 1327  OT Stop Time: 1344  OT Total Time (min): 17 min    Billable Minutes:Therapeutic Activity 8    OT/PRIETO: OT          7/23/2025

## 2025-07-23 NOTE — PROGRESS NOTES
[facility] - [facility]iology Stepdown  Nephrology  Progress Note    Patient Name: Radha Abbott  MRN: [id number]  Admission Date: 7/21/2025  Hospital Length of Stay: 0 days  Attending Provider: Walter Mcintyre MD   Primary Care Physician: Vasu Kong MD  Principal Problem:Failure to thrive in adult    Subjective:     HPI: The patient is a 65 y.o. Black or  Male with multiple co morbidities including stage D HFrEF 2/2 ICM s/p HM3 as DT (12/1/2023), ESRD on HD (MWF), and debility who presents to ED on 7/21/2025 with complaints of bilateral leg weakness. Of note, the patient has been hospitalized multiple times w similar complaints including C -difficile infection and low-flow alarms. Wife reports progressive LE weakness x 1 week and decreased appetite. Denies complaints of shortness of breath and CP. The patient is MWF dialysis patient and was last dialyzed yesterday. Nephrology consulted for management of ESRD and HD treatment.    Interval History:   No distress overnight. HD to be completed today at bedside due to LVAD.     Review of patient's allergies indicates:  No Known Allergies  Current Facility-Administered Medications   Medication Frequency    amiodarone tablet 400 mg Daily    cholestyramine 4 gram packet 4 g BID    dextrose 50% injection 12.5 g PRN    dextrose 50% injection 25 g PRN    diphenoxylate-atropine 2.5-0.025 mg per tablet 2 tablet QID PRN    droNABinol capsule 2.5 mg BID    glucagon (human recombinant) injection 1 mg PRN    glucose chewable tablet 16 g PRN    glucose chewable tablet 24 g PRN    hydrALAZINE tablet 75 mg Q8H    insulin aspart U-100 pen 0-5 Units QID (AC + HS) PRN    Lactobacillus rhamnosus GG capsule 1 capsule Daily    mupirocin 2 % ointment BID    trazodone split tablet 25 mg Nightly PRN    venlafaxine tablet 37.5 mg Daily    warfarin (COUMADIN) tablet 1 mg Daily       Objective:     Vital Signs (Most Recent):  Temp: 98.9 °F (37.2 °C) (07/23/25 0731)  Pulse: 86  (07/23/25 1103)  Resp: 18 (07/23/25 0731)  BP: (!) 98/0 (07/23/25 0430)  SpO2: 96 % (07/23/25 0731) Vital Signs (24h Range):  Temp:  [97.6 °F (36.4 °C)-99.4 °F (37.4 °C)] 98.9 °F (37.2 °C)  Pulse:  [] 86  Resp:  [18] 18  SpO2:  [95 %-99 %] 96 %  BP: ()/(0-80) 98/0     Weight: 77.9 kg (171 lb 11.8 oz) (07/23/25 0500)  Body mass index is 22.66 kg/m².  Body surface area is 2 meters squared.    I/O last 3 completed shifts:  In: 822 [P.O.:822]  Out: -      Physical Exam  Vitals and nursing note reviewed.   Constitutional:       General: He is not in acute distress.  Cardiovascular:      Rate and Rhythm: Normal rate.      Comments: LVAD hum  Pulmonary:      Effort: Pulmonary effort is normal.   Abdominal:      Tenderness: There is no abdominal tenderness.   Musculoskeletal:         General: Normal range of motion.      Cervical back: Normal range of motion.   Neurological:      Mental Status: He is alert and oriented to person, place, and time.   Psychiatric:         Mood and Affect: Mood normal.         Behavior: Behavior normal.          Significant Labs:  CBC:   Recent Labs   Lab 07/23/25  0413   WBC 4.31   RBC 3.65*   HGB 10.4*   HCT 33.0*   *   MCV 90   MCH 28.5   MCHC 31.5*     CMP:   Recent Labs   Lab 07/22/25  0339 07/23/25  0413   GLU 72  --    CALCIUM 7.3*  --    ALBUMIN 2.0* 2.1*   PROT 5.3* 5.6*   *  --    K 3.3*  --    CO2 23  --      --    BUN 18  --    CREATININE 3.7*  --    ALKPHOS 114 113   ALT 69* 69*   AST 98* 91*   BILITOT 0.5 0.5     All labs within the past 24 hours have been reviewed.     Assessment/Plan:     Renal/  ESRD (end stage renal disease)  65 y.o. Black or  Male ESRD-HD M-W-F presents to ED on 7/21/2025 with diagnosis of: Generalized weakness [R53.1];Failure to thrive in adult [R62.7]   Nephrology consulted for inpatient ESRD-HD management    Outpatient HD Information:  -Dialysis modality: Hemodialysis  -Outpatient HD unit: [facility] [place]  [place]  -Nephrologist: ?  -HD TX days: Monday/Wednesday/Friday, duration of treatment: 3 hrs  -Last HD TX prior to hospital admission: 7/22/2025  -Dialysis access: dialysis catheter   -Residual urine: Minium  -EDW: 82.1 kg     Assessment:   - HD today for clearance and volume management. Target UF 0.5 L.   - Continue to monitor intake and output  - Please avoid gadolinium, fleets, phos-based laxatives, NSAIDs  - Dialysis thrice weekly unless more urgent indications arise. Will evaluate RRT requirements Daily.  - Increased mortality risk secondary to ESRD on dialysis and HFrEF stage D s/p HM 3  in setting of repeated admissions for BLE weakness, diarrhea, and decrease appetite.   - OP records reviewed; inpatient dialysis orders to be adjusted as needed per OP records.       Anemia of ESRD   Recent Labs   Lab 07/21/25  1547 07/22/25  0339 07/23/25  0413   WBC 4.21 4.45 4.31   HGB 10.6* 9.7* 10.4*   HCT 33.3* 30.5* 33.0*   * 133* 143*       Lab Results   Component Value Date    FESATURATED 6 (L) 06/12/2024    FERRITIN 4,766.0 (H) 05/24/2025       - Goal in ESRD is Hgb of 10-11. Hgb 10.4. @ target.   - EPO can be administered and dosed per his OP unit upon discharge.  - if patient is on iron infusions please D/C when active infection, cautiously use EPO when hx of malignancy, high BP (SBP usually > 170mmhg).    Mineral Bone Disease in ESRD   Lab Results   Component Value Date    .7 (H) 06/18/2024    CALCIUM 7.3 (L) 07/22/2025    ALBUMIN 2.1 (L) 07/23/2025    CAION 1.16 01/23/2024    PHOS 1.3 (L) 07/22/2025       - F/U PO4, Mg, Calcium. And albumin levels daily.   - Please liberalized diet and protein intake goal 1.5 g/kg/d with 1 L fluid restriction   - If patient has poor oral intake, recommend nephro nutritional shakes (ex Novasource)  - Phos low. HOLD binders.     Endocrine  * Failure to thrive in adult  - defer to primary team         Thank you for your consult. I will follow-up with patient. Please  contact us if you have any additional questions.    Matilda Hinton DNP, FNP-C  Nephrology  [facility] - Cardiology Stepdown

## 2025-07-23 NOTE — SUBJECTIVE & OBJECTIVE
Interval History:   No distress overnight. HD to be completed today at bedside due to LVAD.     Review of patient's allergies indicates:  No Known Allergies  Current Facility-Administered Medications   Medication Frequency    amiodarone tablet 400 mg Daily    cholestyramine 4 gram packet 4 g BID    dextrose 50% injection 12.5 g PRN    dextrose 50% injection 25 g PRN    diphenoxylate-atropine 2.5-0.025 mg per tablet 2 tablet QID PRN    droNABinol capsule 2.5 mg BID    glucagon (human recombinant) injection 1 mg PRN    glucose chewable tablet 16 g PRN    glucose chewable tablet 24 g PRN    hydrALAZINE tablet 75 mg Q8H    insulin aspart U-100 pen 0-5 Units QID (AC + HS) PRN    Lactobacillus rhamnosus GG capsule 1 capsule Daily    mupirocin 2 % ointment BID    trazodone split tablet 25 mg Nightly PRN    venlafaxine tablet 37.5 mg Daily    warfarin (COUMADIN) tablet 1 mg Daily       Objective:     Vital Signs (Most Recent):  Temp: 98.9 °F (37.2 °C) (07/23/25 0731)  Pulse: 86 (07/23/25 1103)  Resp: 18 (07/23/25 0731)  BP: (!) 98/0 (07/23/25 0430)  SpO2: 96 % (07/23/25 0731) Vital Signs (24h Range):  Temp:  [97.6 °F (36.4 °C)-99.4 °F (37.4 °C)] 98.9 °F (37.2 °C)  Pulse:  [] 86  Resp:  [18] 18  SpO2:  [95 %-99 %] 96 %  BP: ()/(0-80) 98/0     Weight: 77.9 kg (171 lb 11.8 oz) (07/23/25 0500)  Body mass index is 22.66 kg/m².  Body surface area is 2 meters squared.    I/O last 3 completed shifts:  In: 822 [P.O.:822]  Out: -      Physical Exam  Vitals and nursing note reviewed.   Constitutional:       General: He is not in acute distress.  Cardiovascular:      Rate and Rhythm: Normal rate.      Comments: LVAD hum  Pulmonary:      Effort: Pulmonary effort is normal.   Abdominal:      Tenderness: There is no abdominal tenderness.   Musculoskeletal:         General: Normal range of motion.      Cervical back: Normal range of motion.   Neurological:      Mental Status: He is alert and oriented to person, place, and time.    Psychiatric:         Mood and Affect: Mood normal.         Behavior: Behavior normal.          Significant Labs:  CBC:   Recent Labs   Lab 07/23/25 0413   WBC 4.31   RBC 3.65*   HGB 10.4*   HCT 33.0*   *   MCV 90   MCH 28.5   MCHC 31.5*     CMP:   Recent Labs   Lab 07/22/25  0339 07/23/25 0413   GLU 72  --    CALCIUM 7.3*  --    ALBUMIN 2.0* 2.1*   PROT 5.3* 5.6*   *  --    K 3.3*  --    CO2 23  --      --    BUN 18  --    CREATININE 3.7*  --    ALKPHOS 114 113   ALT 69* 69*   AST 98* 91*   BILITOT 0.5 0.5     All labs within the past 24 hours have been reviewed.

## 2025-07-23 NOTE — PT/OT/SLP EVAL
Physical Therapy Evaluation    Patient Name:  Radha Abbott   MRN:  [id number]    Recommendations:     Discharge Recommendations: Moderate Intensity Therapy  Discharge Equipment Recommendations: hospital bed, lift device   Barriers to Discharge: Increased level of assist    Assessment:     Radha Abbott is a 65 y.o. male admitted with a medical diagnosis of Failure to thrive in adult. He presents with the following impairments/functional limitations: weakness, impaired endurance, impaired self care skills, impaired functional mobility, gait instability, impaired balance, decreased safety awareness, impaired cardiopulmonary response to activity, decreased lower extremity function, decreased upper extremity function, decreased coordination. Pt presenting with HOB elevated and agreeable to completing therapy evaluation. Session limited to bed-level activity 2/2 HD running with mild lethargy. Pt with active movement noted in BLE though poor muscular endurance evident. Pt's spouse at bedside providing explanation on decline in functional status over last 3 weeks. Pt and spouse education on DME recommendations. Recommend moderate intensity therapy following discharge once medically stable in order to reduce fall risk, reduce caregiver burden, improve quality of life, and maximize functional independence. Pt would continue to benefit from skilled acute PT in order to address current deficits and progress functional mobility.     Rehab Prognosis: Good; patient would benefit from acute skilled PT services 4 x/week to address these deficits and reach maximum level of function.  Recent Surgery: * No surgery found *      Plan:     During this hospitalization, patient to be seen 4 x/week to address the identified rehab impairments via gait training, therapeutic activities, therapeutic exercises, neuromuscular re-education and progress toward the following goals:    Plan of Care Expires:  08/23/25    Subjective     Chief Complaint:  "None verbalized  Patient/Family Comments/Goals: "I just got really weak."  Pain/Comfort:  Pain Rating 1: 0/10    Patients cultural, spiritual, Synagogue conflicts given the current situation: no    Living Environment:  Living Environment: Patient lives with their spouse in a single story house with walk-in shower, grab bars, and built-in shower chair.  Prior Level of Function: Prior to admission, patient requiring significant assistance with all transfers and ADL completion. Pt with several falls over last few weeks.  Equipment Used at Home: bath bench, grab bar, bedside commode, walker, rolling.  DME owned (not currently used): none  Assistance Upon Discharge: spouse    Objective:     Communicated with nursing prior to session. Patient found HOB elevated with LVAD (HD) upon PT entry to room.    General Precautions: Standard, fall  Orthopedic Precautions:N/A    Braces: N/A    Exams:  RLE ROM: WFL  RLE Strength: Grossly 4/5  LLE ROM: WFL  LLE Strength: Grossly 4/5  Sensation: Intact light touch to BLEs    Functional Mobility:  Not completed this date 2/2 active HD    Therapeutic Activities and Exercises:  Patient educated on role of acute care PT and PT POC, safety while in hospital including calling nurse for mobility, and call light usage.  Pt educated on importance of maximal participation in therapy session in order to reduce negative effects of prolonged sedentary positioning.   Answered all questions within PT scope of practice and addressed functional mobility concerns.    AM-PAC 6 CLICK MOBILITY  Total Score:9     Patient left HOB elevated with all lines intact, call button in reach, RN notified, and spouse present.    GOALS:   Multidisciplinary Problems       Physical Therapy Goals          Problem: Physical Therapy    Goal Priority Disciplines Outcome Interventions   Physical Therapy Goal     PT, PT/OT Progressing    Description: Goals to be met by: 8/23/2025     Patient will increase functional " independence with mobility by performin. Supine to sit with Minimal Assistance  2. Sit to supine with Minimal Assistance  3. Sit to stand transfer with Moderate Assistance  4. Bed to chair transfer with Maximum Assistance using LRAD  5. Sitting at edge of bed x5 minutes with Stand-by Assistance  6. Lower extremity exercise program x15 reps per handout, with assistance as needed                         DME Justifications:  Radha requires a hospital bed due to him requiring positioning of the body in ways not feasible with an ordinary bed due to limited ability and cannot independently make changes in body position without the use of the bed.The positioning of the body cannot be sufficiently resolved by the use of pillows and wedges.    History:     Past Medical History:   Diagnosis Date    Aspiration pneumonia of both lungs 2024    CAD (coronary artery disease)     CHF (congestive heart failure)     Delirium 2024    Diabetes mellitus     HFrEF (heart failure with reduced ejection fraction)     Hypoglycemia 2024    ICD (implantable cardioverter-defibrillator) in place     LVAD (left ventricular assist device) present 2023    MI, old     PAF (paroxysmal atrial fibrillation) 10/11/2023    Stage 4 chronic kidney disease 02/15/2024    Type 2 diabetes mellitus without complication, without long-term current use of insulin 10/07/2023       Past Surgical History:   Procedure Laterality Date    ANGIOPLASTY-VENOUS ARTERY Right 2023    Procedure: ANGIOPLASTY-VENOUS ARTERY, RIGHT FEMORAL;  Surgeon: Yuri Washington MD;  Location: [address];  Service: Cardiovascular;  Laterality: Right;    AORTIC VALVULOPLASTY N/A 2023    Procedure: REPAIR, AORTIC VALVE;  Surgeon: Yuri Washington MD;  Location: [address];  Service: Cardiovascular;  Laterality: N/A;    CARDIAC SURGERY      CLOSURE N/A 2023    Procedure: CLOSURE, TEMPORARY;  Surgeon: Yuri Washington MD;  Location: [facility] OR  2ND FLR;  Service: Cardiovascular;  Laterality: N/A;    DRAINAGE OF PLEURAL EFFUSION  12/4/2023    Procedure: DRAINAGE, PLEURAL EFFUSION;  Surgeon: Yuri Washington MD;  Location: [facility] OR [facility] FLR;  Service: Cardiovascular;;    INSERTION OF GRAFT TO PERICARDIUM  12/4/2023    Procedure: INSERTION, GRAFT, PERICARDIUM;  Surgeon: Yuri Washington MD;  Location: [facility] OR [facility] FLR;  Service: Cardiovascular;;    INSERTION OF INTRA-AORTIC BALLOON ASSIST DEVICE Right 11/21/2023    Procedure: INSERTION, INTRA-AORTIC BALLOON PUMP;  Surgeon: Finn Cohn MD;  Location: [facility] CATH LAB;  Service: Cardiology;  Laterality: Right;    LEFT VENTRICULAR ASSIST DEVICE Left 12/1/2023    Procedure: INSERTION-LEFT VENTRICULAR ASSIST DEVICE;  Surgeon: Yuri Washington MD;  Location: [facility] OR [facility] FLR;  Service: Cardiovascular;  Laterality: Left;  REDO STERNOTOMY - REDO SAW NEEDED FOR CASE    LYSIS OF ADHESIONS  12/1/2023    Procedure: LYSIS, ADHESIONS;  Surgeon: Yuri Washington MD;  [facility];  Service: Cardiovascular;;    PLACEMENT OF SWAN ROLANDO CATHETER WITH IMAGING GUIDANCE  11/20/2023    Procedure: INSERTION, CATHETER, SWAN-ROLANDO, WITH IMAGING GUIDANCE;  Surgeon: Sajan Hurley MD;  Location: [facility] CATH LAB;  Service: Cardiology;;    REMOVAL OF TUNNELED CENTRAL VENOUS CATHETER (CVC) N/A 3/1/2024    Procedure: REMOVAL, CATHETER, CENTRAL VENOUS, TUNNELED;  Surgeon: Seble Aguilar MD;  Location: [facility] CATH LAB;  Service: Interventional Nephrology;  Laterality: N/A;    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 12/1/2023    Procedure: REPAIR, ANEURYSM, ARTERY, FEMORAL;  Surgeon: Yuri Washington MD;  [facility];  Service: Cardiovascular;  Laterality: Right;  Right Femoral Artery Repair    RIGHT HEART CATHETERIZATION Right 10/10/2023    Procedure: INSERTION, CATHETER, RIGHT HEART;  Surgeon: Bin Gandhi MD;  Location: [facility] CATH LAB;  Service: Cardiology;  Laterality: Right;    RIGHT HEART CATHETERIZATION  Right 10/13/2023    Procedure: INSERTION, CATHETER, RIGHT HEART;  Surgeon: Walter Mcintyre MD;  Location: [facility] CATH LAB;  Service: Cardiology;  Laterality: Right;    RIGHT HEART CATHETERIZATION  11/13/2023    RIGHT HEART CATHETERIZATION Right 11/13/2023    Procedure: INSERTION, CATHETER, RIGHT HEART;  Surgeon: Juventino Bermudez Jr., MD;  Location: [facility] CATH LAB;  Service: Cardiology;  Laterality: Right;    RIGHT HEART CATHETERIZATION Right 11/20/2023    Procedure: INSERTION, CATHETER, RIGHT HEART;  Surgeon: Sajan Hurley MD;  Location: [facility] CATH LAB;  Service: Cardiology;  Laterality: Right;    RIGHT HEART CATHETERIZATION Right 1/22/2024    Procedure: INSERTION, CATHETER, RIGHT HEART;  Surgeon: Brayan Ocampo MD;  Location: [facility] CATH LAB;  Service: Cardiology;  Laterality: Right;    STERNAL WOUND CLOSURE N/A 12/4/2023    Procedure: CLOSURE, WOUND, STERNUM;  Surgeon: Yuri Washington MD;  Location: [facility] OR 2ND FLR;  Service: Cardiovascular;  Laterality: N/A;    STERNOTOMY N/A 12/1/2023    Procedure: STERNOTOMY, REDO;  Surgeon: Yuri Washington MD;  Location: [facility] OR 2ND FLR;  Service: Cardiovascular;  Laterality: N/A;    VALVULOPLASTY, MITRAL VALVE N/A 12/1/2023    Procedure: VALVULOPLASTY, MITRAL VALVE;  Surgeon: Yuri Washington MD;  Location: [facility] OR 2ND FLR;  Service: Cardiovascular;  Laterality: N/A;       Time Tracking:     PT Received On: 07/23/25  PT Start Time: 1327     PT Stop Time: 1342  PT Total Time (min): 15 min     Billable Minutes: Evaluation 15     07/23/2025

## 2025-07-23 NOTE — PROGRESS NOTES
DISCHARGE NOTE:    Radha Abbott is a 65 y.o. male s/p HM3 VAD admitted for weakness/debility/diarrhea recurrence. He will trial cholestyramine for diarrhea.     Pharmacy Interventions/Recommendations:  1) INR Goal: 2-3     2) Antiplatelet Agents: none     3) Heparin Bridging:  yes    4) INR Follow-Up/Discharge Needs:  Repeat INR next week. Cholestyramine started. Counseled to separate night time administration from warfarin by at least 3 hours.     See list of discharge medication for dosing instructions.     Radha Abbott and his caregiver verbalized their understanding and had the opportunity to ask questions.      Discharge Medications:     Medication List        START taking these medications      cholestyramine 4 gram packet  Commonly known as: QUESTRAN  Take 1 packet (4 g total) by mouth 2 (two) times daily. Take second dose at least 3 hours apart from warfarin            CHANGE how you take these medications      warfarin 1 MG tablet  Commonly known as: COUMADIN  Take 1 tablet (1 mg total) by mouth Daily.  What changed: See the new instructions.            CONTINUE taking these medications      amiodarone 400 MG tablet  Commonly known as: PACERONE  Take 1 tablet (400 mg total) by mouth once daily.     diphenoxylate-atropine 2.5-0.025 mg 2.5-0.025 mg per tablet  Commonly known as: LOMOTIL  Take 2 tablets by mouth 4 (four) times daily as needed for Diarrhea.     hydrALAZINE 25 MG tablet  Commonly known as: APRESOLINE  Take 3 tablets (75 mg total) by mouth every 8 (eight) hours.     Lactobacillus rhamnosus GG 10 billion cell capsule  Commonly known as: CULTURELLE  Take 1 capsule by mouth once daily.     miscellaneous medical supply Kit  by Apply Externally route 2 (two) times a day. Use twice daily at 8 AM and 3 PM and record the value in MyChart as directed.     potassium, sodium phosphates 280-160-250 mg Pwpk  Commonly known as: PHOS-NAK  Take 1 packet by mouth 4 (four) times daily before meals and nightly.      traZODone 50 MG tablet  Commonly known as: DESYREL  Take 0.5 tablets (25 mg total) by mouth nightly as needed for Insomnia.     venlafaxine 37.5 MG Tab  Commonly known as: EFFEXOR  Take 1 tablet (37.5 mg total) by mouth once daily.     vitamin D 1000 units Tab  Commonly known as: VITAMIN D3  Take 1 tablet by mouth once daily               Where to Get Your Medications        These medications were sent to [facility]  [address] [place] [id number]      Hours: Always Open Phone: [phone]   cholestyramine 4 gram packet       Information about where to get these medications is not yet available    Ask your nurse or doctor about these medications  warfarin 1 MG tablet

## 2025-07-23 NOTE — ASSESSMENT & PLAN NOTE
-Repeated admissions for BLE weakness, diarrhea.  Patient is still having issues with decrease in appetite, with appetite stimulant prescribed at home.  Patient also having continued diarrhea since being admitted for C diff infection 5/9.  Patient placed on probiotics Lomotil to help slow down diarrhea.  -Palliative care consulted for [facility] conversation  -continue Megace outpatient for appetite stimulation  -Continue Questran 4 g p.o. b.i.d. to help with diarrhea.  -Continue  Nepro to increased caloric intake  -See PT/OT note for mobility and functional living recommendations

## 2025-07-23 NOTE — PLAN OF CARE
PT evaluation complete and appropriate goals established.    Problem: Physical Therapy  Goal: Physical Therapy Goal  Description: Goals to be met by: 2025     Patient will increase functional independence with mobility by performin. Supine to sit with Minimal Assistance  2. Sit to supine with Minimal Assistance  3. Sit to stand transfer with Moderate Assistance  4. Bed to chair transfer with Maximum Assistance using LRAD  5. Sitting at edge of bed x5 minutes with Stand-by Assistance  6. Lower extremity exercise program x15 reps per handout, with assistance as needed    Outcome: Progressing     2025

## 2025-07-23 NOTE — PROGRESS NOTES
[facility] - Cardiology Stepdown  Adult Nutrition  Progress Note    SUMMARY       Recommendations    Interventions: General healthful diet, Protein modified diet, Fat modified diet, Cholesterol modified diet, Medical food supplement therapy, Vitamin and mineral supplement therapy, Collaboration and referral of nutrition care    Recommendations:   1. Continue cardiac diet as tolerated      - fluid per MD    - please continue to document PO % intake via flowsheets     2. Continue novasource renal TID    3. Encourage good intake    4. RD to monitor weight, labs, meds, intake, tolerance    5. Recommend MVI    6. Recommend appetite stimulant    Goal #1: PO intake will meet greater than or equal to 75% of estimated needs by RD follow up  Nutrition Goal Status #1: new  Goal #2: Maintain weight throughout hospitalization  Nutrition Goal Status #2: new  Communication of RD Recs:  (POC)    Nutrition Discharge Planning    Nutrition Discharge Planning: General healthy diet, Therapeutic diet (comments), Oral supplement regimen (comments)  Therapeutic diet (comments): renal diet  Oral supplement regimen (comments): oral nutrition supplement of choice and MVI    Assessment and Plan    Endocrine  Moderate protein-calorie malnutrition  Malnutrition Type:  Context: chronic illness  Level: moderate    Related to (etiology):   Psychological causes increasing nutrient needs due to illness or condition     Signs and Symptoms (as evidenced by):   Muscle and fat depletion   ESRD on HD     Malnutrition Characteristic Summary:  Subcutaneous Fat (Malnutrition): mild depletion  Muscle Mass (Malnutrition): mild depletion    Interventions/Recommendations (treatment strategy):  Collaboration with other providers  ONS    Nutrition Diagnosis Status:   New         Malnutrition Assessment    Malnutrition Context: chronic illness  Malnutrition Level: moderate  Skin (Micronutrient): edema       Subcutaneous Fat (Malnutrition): mild depletion  Muscle Mass  "(Malnutrition): mild depletion   Orbital Region: mild depletion  Cheek Region: mild depletion  Upper Arm Region : mild depletion  Thoracic and Lumbar Region: mild depletion   Jain Region (Muscle Loss): mild depletion  Clavicle Bone Region (Muscle Loss): mild depletion  Clavicle and Acromion Bone Region (Muscle Loss): mild depletion  Dorsal Hand (Muscle Loss): mild depletion  Patellar Region (Muscle Loss): moderate depletion  Anterior Thigh Region (Muscle Loss): moderate depletion  Posterior Calf Region (Muscle Loss): moderate depletion     Reason for Assessment    Reason For Assessment:  (LVAD coordinator asked RD to see pt via email)  Diagnosis:  (failure to thrive in adult)  General Information Comments: Pt admitted with failure to thrive in adult. PMHx: stage D HFrEF 2/2 ICM s/p HM3 as DT (12/1/2023), ESRD on HD (MWF), debility, DM, MI, CAD, PAF. No recent surgical hx. Pt presented for bilateral leg weakness. Patient and wife bedside reports complaints of progressive weakness in both legs for past 1 week associated with loss of appetite. LVAD present. 1+ edema. HD catheter present. Receiving dialysis during admission. Wound: stage 1 pressure injury buttocks. Spoke with pt and pt's wife. No GI s/s - BM: 7/22. Pt having a poor appetite and intake - PO: 0-25%. Pt does drink ONS - only eats fruit and yogurt. NFPE completed on 7/23.    Nutrition/Diet History    Nutrition Intake History: fruit, yogurt, ONS  Spiritual, Cultural Beliefs, Gnosticist Practices, Values that Affect Care: no  Food Allergies: NKFA  Factors Affecting Nutritional Intake: decreased appetite  Nutrition-related SDOH: Adequate food in home environment    Anthropometrics    Height: 6' 1" (185.4 cm)  Height (inches): 73 in  Height Method: Stated  Weight: 77.9 kg (171 lb 11.8 oz)  Weight (lb): 171.74 lb  Weight Method: Standard Scale  Ideal Body Weight (IBW), Male: 184 lb  % Ideal Body Weight, Male (lb): 91.54 %  BMI (Calculated): 22.7  BMI Grade: " 18.5-24.9 - normal    Lab/Procedures/Meds    Pertinent Labs Reviewed: reviewed  Pertinent Labs Comments: H/H 10.4/33 low, Na 135 low, K 3.3 low, creatinine 3.7 high, GFR 18 low, Ca 7.3 low, P 1.3 low, albumin 2.1 low, AST 91 high, ALT 69 high, CRP 92.9 high    Pertinent Medications Reviewed: reviewed  Scheduled Meds:   amiodarone  400 mg Oral Daily    cholestyramine  1 packet Oral BID    droNABinol  2.5 mg Oral BID    hydrALAZINE  75 mg Oral Q8H    Lactobacillus rhamnosus GG  1 capsule Oral Daily    mupirocin   Nasal BID    venlafaxine  37.5 mg Oral Daily    warfarin  1 mg Oral Daily     Continuous Infusions:  PRN Meds:.  Current Facility-Administered Medications:     dextrose 50%, 12.5 g, Intravenous, PRN    dextrose 50%, 25 g, Intravenous, PRN    diphenoxylate-atropine 2.5-0.025 mg, 2 tablet, Oral, QID PRN    glucagon (human recombinant), 1 mg, Intramuscular, PRN    glucose, 16 g, Oral, PRN    glucose, 24 g, Oral, PRN    insulin aspart U-100, 0-5 Units, Subcutaneous, QID (AC + HS) PRN    traZODone, 25 mg, Oral, Nightly PRN    Estimated/Assessed Needs    Weight Used For Calorie Calculations: 77.9 kg (171 lb 11.8 oz)  Energy Calorie Requirements (kcal): 9260-2438 kcal  Energy Need Method: Kcal/kg (25-35 kcal/kg)  Protein Requirements:  g/pro (1.0-1.5 g/kg)  Weight Used For Protein Calculations: 77.9 kg (171 lb 11.8 oz)        RDA Method (mL): 1948  CHO Requirement: 244-341 g    Nutrition Prescription Ordered    Current Diet Order: cardiac 1500 ml    Evaluation of Received Nutrient/Fluid Intake    I/O: +822 since admit  Energy Calories Required: not meeting needs  Protein Required: not meeting needs  Fluid Required:  (per MD)  Tolerance: tolerating  % Intake of Estimated Energy Needs: 25 - 50 %  % Meal Intake: 0 - 25 %    Nutrition Risk    Level of Risk/Frequency of Follow-up: moderate (1-2/week)     Monitor and Evaluation    Monitor and Evaluation: Energy intake, Protein intake, Food and beverage intake,  Carbohydrate intake, Diet order, Weight, Electrolyte and renal panel, Gastrointestinal profile, Glucose/endocrine profile, Inflammatory profile, Lipid profile, Nutrition focused physical findings, Skin (NFPE completed on 7/23)     Nutrition Follow-Up    RD Follow-up?: Yes

## 2025-07-23 NOTE — ASSESSMENT & PLAN NOTE
65 y.o. Black or  Male ESRD-HD M-W-F presents to ED on 7/21/2025 with diagnosis of: Generalized weakness [R53.1];Failure to thrive in adult [R62.7]   Nephrology consulted for inpatient ESRD-HD management    Outpatient HD Information:  -Dialysis modality: Hemodialysis  -Outpatient HD unit: [facility]  -Nephrologist: ?  -HD TX days: Monday/Wednesday/Friday, duration of treatment: 3 hrs  -Last HD TX prior to hospital admission: 7/22/2025  -Dialysis access: dialysis catheter   -Residual urine: Minium  -EDW: 82.1 kg     Assessment:   - HD today for clearance and volume management. Target UF 0.5 L.   - Continue to monitor intake and output  - Please avoid gadolinium, fleets, phos-based laxatives, NSAIDs  - Dialysis thrice weekly unless more urgent indications arise. Will evaluate RRT requirements Daily.  - Increased mortality risk secondary to ESRD on dialysis and HFrEF stage D s/p HM 3  in setting of repeated admissions for BLE weakness, diarrhea, and decrease appetite.   - OP records reviewed; inpatient dialysis orders to be adjusted as needed per OP records.       Anemia of ESRD   Recent Labs   Lab 07/21/25  1547 07/22/25  0339 07/23/25  0413   WBC 4.21 4.45 4.31   HGB 10.6* 9.7* 10.4*   HCT 33.3* 30.5* 33.0*   * 133* 143*       Lab Results   Component Value Date    FESATURATED 6 (L) 06/12/2024    FERRITIN 4,766.0 (H) 05/24/2025       - Goal in ESRD is Hgb of 10-11. Hgb 10.4. @ target.   - EPO can be administered and dosed per his OP unit upon discharge.  - if patient is on iron infusions please D/C when active infection, cautiously use EPO when hx of malignancy, high BP (SBP usually > 170mmhg).    Mineral Bone Disease in ESRD   Lab Results   Component Value Date    .7 (H) 06/18/2024    CALCIUM 7.3 (L) 07/22/2025    ALBUMIN 2.1 (L) 07/23/2025    CAION 1.16 01/23/2024    PHOS 1.3 (L) 07/22/2025       - F/U PO4, Mg, Calcium. And albumin levels daily.   - Please liberalized diet and protein  intake goal 1.5 g/kg/d with 1 L fluid restriction   - If patient has poor oral intake, recommend nephro nutritional shakes (ex Novasource)  - Phos low. HOLD binders.

## 2025-07-23 NOTE — PLAN OF CARE
Problem: Adult Inpatient Plan of Care  Goal: Plan of Care Review  Outcome: Progressing  Goal: Patient-Specific Goal (Individualized)  Outcome: Progressing  Goal: Absence of Hospital-Acquired Illness or Injury  Outcome: Progressing  Goal: Optimal Comfort and Wellbeing  Outcome: Progressing  Goal: Readiness for Transition of Care  Outcome: Progressing     Problem: Diabetes Comorbidity  Goal: Blood Glucose Level Within Targeted Range  Outcome: Progressing     Problem: Wound  Goal: Optimal Coping  Outcome: Progressing  Goal: Optimal Functional Ability  Outcome: Progressing  Goal: Absence of Infection Signs and Symptoms  Outcome: Progressing  Goal: Improved Oral Intake  Outcome: Progressing  Goal: Optimal Pain Control and Function  Outcome: Progressing  Goal: Skin Health and Integrity  Outcome: Progressing  Goal: Optimal Wound Healing  Outcome: Progressing     Problem: Fall Injury Risk  Goal: Absence of Fall and Fall-Related Injury  Outcome: Progressing     Problem: Skin Injury Risk Increased  Goal: Skin Health and Integrity  Outcome: Progressing     Problem: Ventricular Assist Device  Goal: Optimal Adjustment to Device  Outcome: Progressing  Goal: Absence of Bleeding  Outcome: Progressing  Goal: Absence of Embolism Signs and Symptoms  Outcome: Progressing  Goal: Optimal Blood Flow  Outcome: Progressing  Goal: Absence of Infection Signs and Symptoms  Outcome: Progressing  Goal: Effective Right-Sided Heart Function  Outcome: Progressing     Problem: Coping Ineffective  Goal: Effective Coping  Outcome: Progressing

## 2025-07-23 NOTE — ASSESSMENT & PLAN NOTE
Malnutrition Type:  Context: chronic illness  Level: moderate    Related to (etiology):   Psychological causes increasing nutrient needs due to illness or condition     Signs and Symptoms (as evidenced by):   Muscle and fat depletion   ESRD on HD     Malnutrition Characteristic Summary:  Subcutaneous Fat (Malnutrition): mild depletion  Muscle Mass (Malnutrition): mild depletion    Interventions/Recommendations (treatment strategy):  Collaboration with other providers  ONS    Nutrition Diagnosis Status:   New   Unknown if ever smoked

## 2025-07-23 NOTE — PROGRESS NOTES
Discharge planning    Per NP, pt is ready for discharge today and will need HH. SW contacted [facility] to see if their BR office can accept, per spouse's stated preference yesterday. [facility] BR unfortunately cannot accept due to staffing.    SW met with spouse in [place] due to pt was getting dressing change. Spouse gives SW permission to send blast referral for home health. SW sent to 11 agencies via Epic, with the following results:    [facility]: out of network  [facility]: declined, no reason given  [facility]: out of network  [facility]: does not accept referrals from [place] agencies  [facility]: decline due to staffing  [facility]: out of network  [facility]: left v/m for [facility]: phone was disconnected   [facility]: has no one on site to check Epic referrals. SW faxed referral to them. Awaiting DON review.  [facility]: declined  [facility]: declined: unable to accept Medicaid pt needing therapies, which pt does.    Spouse voices understanding if no home health is found, team will readdress hospice option. Spouse would like pt to be able to continue with HD and to have therapies.    Hospital bed order placed and pt approved. PT/OT recommending pt also get a ricardo lift.    Providing ongoing psychosocial and counseling support, education, resources, assistance and discharge planning as indicated. Following and available.    Addendum:  [facility] unable to accept pt at this time.

## 2025-07-23 NOTE — ASSESSMENT & PLAN NOTE
Procedure: Device Interrogation Including analysis of device parameters  Current Settings: Ventricular Assist Device  Review of device function is stable/unstable stable        7/23/2025     7:10 AM 7/23/2025     4:34 AM 7/22/2025    11:40 PM 7/22/2025     8:17 PM 7/22/2025     4:00 PM 7/22/2025    12:00 PM 7/22/2025     8:00 AM   TXP LVAD INTERROGATIONS   Type HeartMate3 HeartMate3 HeartMate3 HeartMate3 HeartMate3 HeartMate3 HeartMate3   Flow 3.3 3.2 3.1 3 3.3 3.2 3.4   Speed 5000 5050 5000 5000 5000 5000 5000   PI 7.4 7.3 7.8 8.1 3.9 6.9 6.6   Power ([place]) 3.5 3.5 3.5 3.4 3.4 3.3 3.4   LSL 4600 4600 4600 4600 4600 4600 4600   Low Flow Alarm no no   no no no   Pulsatility  Pulse Pulse Pulse Pulse Pulse Intermittent pulse

## 2025-07-23 NOTE — DISCHARGE SUMMARY
[facility] - Cardiology Stepdown  Heart Transplant  Discharge Summary      Patient Name: Radha Abbott  MRN: [id number]  Admission Date: 7/21/2025  Hospital Length of Stay: 0 days  Discharge Date and Time: 07/23/2025 12:34 PM  Attending Physician: Walter Mcintyre MD   Discharging Provider: [facility]  Primary Care Provider: Vasu Kong MD     HPI: No notes on file    * No surgery found *     Hospital Course: Mr. Abbott is a 65-year-old male who was admitted on 07/21 for complaints progressive weakening bilateral lower extremities, with the associated loss of appetite.  Per the patient's wife, patient also has been complaining of patient having SOB with minimal exertion. The patient also states that he was continuing to have diarrhea, with frequent bowel movements. During this hospital admission, all home medications were restarted and patient was also placed on Questran 4 g b.i.d. to help to decrease watery BMs and have more formed stools.  PT/OT was also consulted on the case to evaluate mobility and ADL functional status.  They recommended the patient would benefit from acute skilled OT services he will most likely go home on home health with PT/OT.  Palliative Medicine was also consulted on the case for goals of care conversations.  Patient will follow-up with palliative Medicine at outpatient clinic.  Since this is the patient's regular HD day, patient will undergo HD prior to discharge.    Patient had previous hospital stays over the past couple of months starting in early May. 5/9-11: Patient was hospitalized for anemia and C-difficile infection. At that time patient received a unit of blood, hydrated for low flow alarms, hydralazine given to target MAPs to 80s with Hydral 50mg TID, then put on extended vancomycin course with taper for discharge on 5/11. He was admitted again for ongoing diarrhea and low-flow alarms from 05/23 - 05/26/2025 and treated with IV fluids.  His last clinic visit with  heart transplant Service with Dr. Ocampo on 06/11/2023.  His C diff report was positive on August 2024, 05/10/2025.  He was negative for C diff infection on 05/23/2025, 07/10/2025.  Patient was admitted for weakness, diarrhea, loss of appetite with low-flow alarms from 7/10-7/14/25 and repeat  C diff testing on 7/12 was negative also.  He was recommended supportive care with probiotics, and Lomotil.       Goals of Care Treatment Preferences:  Code Status: Full Code    Health care agent: Pt's wife, Arlyn is NOK  Health care agent number: No value filed.          What is most important right now is to focus on quality of life, even if it means sacrificing a little time, extending life as long as possible, even it it means sacrificing quality, curative/life-prolongation (regardless of treatment burdens).  Accordingly, we have decided that the best plan to meet the patient's goals includes continuing with treatment.      Consults (From admission, onward)          Status Ordering Provider     Inpatient consult to Palliative Care  Once        Provider:  (Not yet assigned)    Completed ARMIDA GARDUNO     Inpatient consult to Nephrology  Once        Provider:  (Not yet assigned)    Completed ARMIDA GARDUNO            Significant Diagnostic Studies: Labs: All labs within the past 24 hours have been reviewed    Pending Diagnostic Studies:       None          Final Active Diagnoses:    Diagnosis Date Noted POA    PRINCIPAL PROBLEM:  Failure to thrive in adult [R62.7] 07/22/2025 Yes    Chronic combined systolic and diastolic CHF, NYHA class 4 [I50.42] 06/12/2024 Yes     Chronic    LVAD (left ventricular assist device) present [Z95.811] 12/01/2023 Not Applicable    PAF (paroxysmal atrial fibrillation) [I48.0] 10/11/2023 Yes    ESRD (end stage renal disease) [N18.6] 01/22/2024 Yes    Advanced care planning/counseling discussion [Z71.89] 07/22/2025 Not Applicable    Goals of care, counseling/discussion [Z71.89] 05/30/2024 Not Applicable  "   Protein-calorie malnutrition, moderate [E44.0] 12/14/2023 Yes    HFrEF (heart failure with reduced ejection fraction) [I50.20] 12/09/2023 Yes    Palliative care encounter [Z51.5] 11/17/2023 Not Applicable    Type 2 diabetes mellitus without complication, without long-term current use of insulin [E11.9] 10/07/2023 Yes      Problems Resolved During this Admission:      Discharged Condition: stable    Disposition:     Follow Up:    Patient Instructions:      HOSPITAL BED FOR HOME USE     Order Specific Question Answer Comments   Type: Electric    Length of need (1-99 months): 99    Does patient have medical equipment at home? bath bench    Does patient have medical equipment at home? grab bar    Does patient have medical equipment at home? bedside commode    Does patient have medical equipment at home? walker, rolling    Does patient have medical equipment at home? rollator    Height: 6' 1" (1.854 m)    Weight: 77.9 kg (171 lb 11.8 oz)    Please check all that apply: Patient requires the head of bed to be elevated more than 30 degrees most of the time due to congestive heart failure, chronic pulmonary disease, or aspiration.  Pillows and wedges have been considered and ruled out.      Medications:  Reconciled Home Medications:      Medication List        START taking these medications      cholestyramine 4 gram packet  Commonly known as: QUESTRAN  Take 1 packet (4 g total) by mouth 2 (two) times daily. Take second dose at least 3 hours apart from warfarin            CHANGE how you take these medications      warfarin 1 MG tablet  Commonly known as: COUMADIN  Take 1 tablet (1 mg total) by mouth Daily.  What changed: See the new instructions.            CONTINUE taking these medications      amiodarone 400 MG tablet  Commonly known as: PACERONE  Take 1 tablet (400 mg total) by mouth once daily.     diphenoxylate-atropine 2.5-0.025 mg 2.5-0.025 mg per tablet  Commonly known as: LOMOTIL  Take 2 tablets by mouth 4 (four) " times daily as needed for Diarrhea.     hydrALAZINE 25 MG tablet  Commonly known as: APRESOLINE  Take 3 tablets (75 mg total) by mouth every 8 (eight) hours.     Lactobacillus rhamnosus GG 10 billion cell capsule  Commonly known as: CULTURELLE  Take 1 capsule by mouth once daily.     miscellaneous medical supply Kit  by Apply Externally route 2 (two) times a day. Use twice daily at 8 AM and 3 PM and record the value in MyChart as directed.     potassium, sodium phosphates 280-160-250 mg Pwpk  Commonly known as: PHOS-NAK  Take 1 packet by mouth 4 (four) times daily before meals and nightly.     traZODone 50 MG tablet  Commonly known as: DESYREL  Take 0.5 tablets (25 mg total) by mouth nightly as needed for Insomnia.     venlafaxine 37.5 MG Tab  Commonly known as: EFFEXOR  Take 1 tablet (37.5 mg total) by mouth once daily.     vitamin D 1000 units Tab  Commonly known as: VITAMIN D3  Take 1 tablet by mouth once daily              JUAN Dasilva-BC  Heart Transplant  [facility] - Cardiology Stepdown

## 2025-07-23 NOTE — PROGRESS NOTES
07/23/25 0430   Vital Signs   BP (!) 98/0   BP Location Left arm   BP Method Doppler   Patient Position Lying     Notified MD Constantino about pt doppler is high this morning. Pt denied symptoms. Will provide pt's scheduled hydralazine @0500

## 2025-07-23 NOTE — HOSPITAL COURSE
Mr. Abbott is a 65-year-old male who was admitted on 07/21 for complaints progressive weakening bilateral lower extremities, with the associated loss of appetite.  Per the patient's wife, patient also has been complaining of patient having SOB with minimal exertion.  The patient also states that he was continuing to have diarrhea, with frequent bowel movements.    Patient had previous hospital stays over the past couple of months starting in early May. 5/9-11: Patient was hospitalized for anemia and C-difficile infection. At that time patient received a unit of blood, hydrated for low flow alarms, hydralazine given to target MAPs to 80s with Hydral 50mg TID, then put on extended vancomycin course with taper for discharge on 5/11. He was admitted again for ongoing diarrhea and low-flow alarms from 05/23 - 05/26/2025 and treated with IV fluids.  His last clinic visit with heart transplant Service with Dr. Ocampo on 06/11/2023.  His C diff report was positive on August 2024, 05/10/2025.  He was negative for C diff infection on 05/23/2025, 07/10/2025.  Patient was admitted for weakness, diarrhea, loss of appetite with low-flow alarms from 7/10-7/14/25 and repeat  C diff testing on 7/12 was negative also.  He was recommended supportive care with probiotics, and Lomotil.     During this hospital admission all home medications were restarted and patient was also placed on Questran 4 g b.i.d. to help to decrease watery BMs and have more formed stools.  PT/OT was also consulted on the case to evaluate mobility and ADL functional status.  They recommended the patient would benefit from acute skilled OT services he will most likely go home on home health with PT/OT.  Palliative Medicine was also consulted on the case for goals of care conversations.  Patient will transition to home hospice.

## 2025-07-23 NOTE — NURSING
Pt free of falls and injury. Pt AAOx4. Fall precautions remain in place. Sternal precautions maintained. Pt remains on room air. NSR on telemetry with LVAD artifact. LVAD hum present and smooth. DLES dressing CDI. Plan of care reviewed with pt. Pt resting comfortably with no complaints of pain. Pt denies chest pain, headache, and SOB. No acute distress noted,  plan of care continues.

## 2025-07-24 LAB
ABSOLUTE EOSINOPHIL (OHS): 0.07 K/UL
ABSOLUTE MONOCYTE (OHS): 0.26 K/UL (ref 0.3–1)
ABSOLUTE NEUTROPHIL COUNT (OHS): 2.81 K/UL (ref 1.8–7.7)
APTT PPP: 31.8 SECONDS (ref 21–32)
BASOPHILS # BLD AUTO: 0.03 K/UL
BASOPHILS NFR BLD AUTO: 0.8 %
ERYTHROCYTE [DISTWIDTH] IN BLOOD BY AUTOMATED COUNT: 18.8 % (ref 11.5–14.5)
HCT VFR BLD AUTO: 32.7 % (ref 40–54)
HGB BLD-MCNC: 10.6 GM/DL (ref 14–18)
IMM GRANULOCYTES # BLD AUTO: 0.01 K/UL (ref 0–0.04)
IMM GRANULOCYTES NFR BLD AUTO: 0.3 % (ref 0–0.5)
INR PPP: 1.7 (ref 0.8–1.2)
LYMPHOCYTES # BLD AUTO: 0.58 K/UL (ref 1–4.8)
MCH RBC QN AUTO: 29.1 PG (ref 27–31)
MCHC RBC AUTO-ENTMCNC: 32.4 G/DL (ref 32–36)
MCV RBC AUTO: 90 FL (ref 82–98)
NUCLEATED RBC (/100WBC) (OHS): 0 /100 WBC
PLATELET # BLD AUTO: 118 K/UL (ref 150–450)
PMV BLD AUTO: 10.4 FL (ref 9.2–12.9)
POCT GLUCOSE: 119 MG/DL (ref 70–110)
POCT GLUCOSE: 59 MG/DL (ref 70–110)
POCT GLUCOSE: 83 MG/DL (ref 70–110)
POCT GLUCOSE: 89 MG/DL (ref 70–110)
PROTHROMBIN TIME: 18.4 SECONDS (ref 9–12.5)
RBC # BLD AUTO: 3.64 M/UL (ref 4.6–6.2)
RELATIVE EOSINOPHIL (OHS): 1.9 %
RELATIVE LYMPHOCYTE (OHS): 15.4 % (ref 18–48)
RELATIVE MONOCYTE (OHS): 6.9 % (ref 4–15)
RELATIVE NEUTROPHIL (OHS): 74.7 % (ref 38–73)
WBC # BLD AUTO: 3.76 K/UL (ref 3.9–12.7)

## 2025-07-24 PROCEDURE — G0378 HOSPITAL OBSERVATION PER HR: HCPCS

## 2025-07-24 PROCEDURE — 63600175 PHARM REV CODE 636 W HCPCS

## 2025-07-24 PROCEDURE — 25000003 PHARM REV CODE 250: Performed by: INTERNAL MEDICINE

## 2025-07-24 PROCEDURE — 85730 THROMBOPLASTIN TIME PARTIAL: CPT | Performed by: STUDENT IN AN ORGANIZED HEALTH CARE EDUCATION/TRAINING PROGRAM

## 2025-07-24 PROCEDURE — 99497 ADVNCD CARE PLAN 30 MIN: CPT | Mod: 25,,, | Performed by: CLINICAL NURSE SPECIALIST

## 2025-07-24 PROCEDURE — 94761 N-INVAS EAR/PLS OXIMETRY MLT: CPT

## 2025-07-24 PROCEDURE — 25000003 PHARM REV CODE 250: Performed by: STUDENT IN AN ORGANIZED HEALTH CARE EDUCATION/TRAINING PROGRAM

## 2025-07-24 PROCEDURE — 85025 COMPLETE CBC W/AUTO DIFF WBC: CPT | Performed by: STUDENT IN AN ORGANIZED HEALTH CARE EDUCATION/TRAINING PROGRAM

## 2025-07-24 PROCEDURE — 36415 COLL VENOUS BLD VENIPUNCTURE: CPT | Performed by: STUDENT IN AN ORGANIZED HEALTH CARE EDUCATION/TRAINING PROGRAM

## 2025-07-24 PROCEDURE — 85610 PROTHROMBIN TIME: CPT | Performed by: STUDENT IN AN ORGANIZED HEALTH CARE EDUCATION/TRAINING PROGRAM

## 2025-07-24 PROCEDURE — 25000003 PHARM REV CODE 250

## 2025-07-24 PROCEDURE — 96374 THER/PROPH/DIAG INJ IV PUSH: CPT

## 2025-07-24 PROCEDURE — 99214 OFFICE O/P EST MOD 30 MIN: CPT | Mod: ,,, | Performed by: NURSE PRACTITIONER

## 2025-07-24 PROCEDURE — 99215 OFFICE O/P EST HI 40 MIN: CPT | Mod: 25,,, | Performed by: CLINICAL NURSE SPECIALIST

## 2025-07-24 PROCEDURE — 27000248 HC VAD-ADDITIONAL DAY

## 2025-07-24 RX ORDER — SODIUM CHLORIDE 9 MG/ML
INJECTION, SOLUTION INTRAVENOUS ONCE
Status: CANCELLED | OUTPATIENT
Start: 2025-07-25

## 2025-07-24 RX ADMIN — CHOLESTYRAMINE 4 G: 4 POWDER, FOR SUSPENSION ORAL at 09:07

## 2025-07-24 RX ADMIN — HYDRALAZINE HYDROCHLORIDE 75 MG: 25 TABLET ORAL at 09:07

## 2025-07-24 RX ADMIN — Medication 1 CAPSULE: at 08:07

## 2025-07-24 RX ADMIN — VENLAFAXINE 37.5 MG: 37.5 TABLET ORAL at 08:07

## 2025-07-24 RX ADMIN — HYDRALAZINE HYDROCHLORIDE 75 MG: 25 TABLET ORAL at 05:07

## 2025-07-24 RX ADMIN — AMIODARONE HYDROCHLORIDE 400 MG: 200 TABLET ORAL at 08:07

## 2025-07-24 RX ADMIN — MUPIROCIN: 20 OINTMENT TOPICAL at 09:07

## 2025-07-24 RX ADMIN — MUPIROCIN: 20 OINTMENT TOPICAL at 08:07

## 2025-07-24 RX ADMIN — HYDRALAZINE HYDROCHLORIDE 75 MG: 25 TABLET ORAL at 04:07

## 2025-07-24 RX ADMIN — DEXTROSE MONOHYDRATE 12.5 G: 25 INJECTION, SOLUTION INTRAVENOUS at 11:07

## 2025-07-24 RX ADMIN — WARFARIN SODIUM 1 MG: 1 TABLET ORAL at 04:07

## 2025-07-24 RX ADMIN — DRONABINOL 2.5 MG: 2.5 CAPSULE ORAL at 08:07

## 2025-07-24 NOTE — ASSESSMENT & PLAN NOTE
-HFrEF stage D s/p  3 (01/2023). TTE 04/2025 reveals LV EF 10-15%, LVAD 5000 rpm, LVIDd 5.5 cm, trace TR  -Continue hydralazine 75 mg every 8 hours  -Diuresis:  Fluid management per Nephrology.  Patient on HD (m,W,F). HD performed yesterday  -Continue to monitor fluid status, strict I's and O, daily weight

## 2025-07-24 NOTE — PROGRESS NOTES
[facility] - Cardiology Stepdown  Palliative Medicine  Progress Note    Patient Name: Radha Abbott  MRN: [id number]  Admission Date: 7/21/2025  Hospital Length of Stay: 0 days  Code Status: Full Code   Attending Provider: Juventino Bermudez Jr.*  Consulting Provider: ROHIT Rose  Primary Care Physician: Vasu Kong MD  Principal Problem:Failure to thrive in adult    Patient information was obtained from spouse/SO and primary team.      Assessment/Plan:     Palliative Care  Palliative care encounter  Pal med consulted for goals of care and advanced care planning for Mr. Abbott a 66 yo gentleman with PMH of tsage D HFrEF 2/2 ICM s/p HM3 as DT (12/1/2023), ESRD on HD (MWF). Admitted to [facility] with debility -  bilateral leg weakness and concerns for failure to thrive.  At time of this encounter Mr. Abbott  is unable to participate in conversation.  Patient appears to be comfortable with no behavioral signs of pain, shortness of breath or acute distress.  Wife is at bedside.     Advance Care Planning    Date: 07/24/2025    Today a voluntary meeting took place: bedside    Patient Participation: Patient is unable to participate     Attendees (Name and  Relationship to patient): Legal surrogate decision-maker: wife  Arlyn Abbott [phone]     Staff attendees (Name and  Role): SARAH Green, Primary team NP, GERMAN Barakat, primary team Marilu STALLWORTH, KYAW VAD coordinator and CAROLINA Marinelli palliative medicine     ACP Conversation (General): Understanding of advance care planning and role of health care agent defined wife states complete understanding  she is the legal next of kin as Mr. Abbott has become debilitated and confused..    Understanding of current condition wife states understanding the current clinical condition and that the patient does not have any additional heart failure treatment options.  In previous encounters wife states understanding VAD is for DT and understands he will die from the heart disease    Wife indicates she had been hoping to bring Mr. Abbott home and continue with home health services.  GERMAN Barakat LCSW explained the current situation and several attempts to resume home health services.  Alternatives to home health also discussed.  LISA Montes explained clinical condition is appropriate to discuss hospice as a treatment option at the end of life.     and APRN explained philosophy of hospice - ensures comfort and quality of life,  also explained criteria for hospice and discontinuation of HD,  Explained in some circumstances hospice will provide  a limited HD sessions or they will not provide HD sessions.  This is usually a case by case discussion   - Mrs. Abbott states it is her wish for her  to be home regardless of continuing or discontinuing HD. Explained home hospice will continue the VAD  Primary  provided patient with list of hospice choices in the [facility] and Mrs. Abbott selected four hospice agencies.       Code Status: Full Code, would recommend having a DNR order but is not required for home hospice.  Home hospice agency will also continue these conversations     ACP Documents: Reviewed current existing digital forms    Goals of care: The family endorses that what is most important right now is to focus on symptom/pain control, quality of life, even if it means sacrificing a little time, and comfort and QOL     Accordingly, we have decided that the best plan to meet the patient's goals includes enrolling in hospice care and pivot to comfort-focused care      Recommendations/  Follow-up tasks: Other (specify below) initiate transition to home hospice. Primary  completing referral process       Length of ACP   conversation in minutes: A total of 25  min was spent on advance care planning, goals of care discussion, emotional support, formulating and communicating prognosis and exploring burden/benefit of various approaches of treatment. This  discussion occurred on a fully voluntary basis with the verbal consent of the patient and/or family.         Pal med APRN and LCSW met with patient and family at bedside.  Pal med introduced   Advance Care Planning    Date: 07/22/2025    ACP Reviewed/No Changes  Voluntary advance care planning discussion had today with patient and spouse. Previously completed HCPOA in electronic medical record is current, no changes made.    Advance Care Planning    Date: 07/22/2025    [facility]  - Pal med re-introduced as patient. Last pal med encounter during VAD preparedness.  Wife states understanding VAD was for DT   - Mr. Abbott did not appear to have the same understanding  of what DT means for him.    - During this conversation we discussed goals of care going forward , in light of the patient's change in clinical status,  -pal med expressed concern with the significant changes in clinical status - HD, debility, unexpected weight loss, poor nutritional status and repeat hospitalizations for similar complaints is leading to adult failure to thrive.    -  Pal med asked patient and wife if the benefit of VAD outweighs the impact on quality of life.   Patient indicates he is unable to spend as much time with his grandchildren. He also indicates the VAD has been working great and that only half of his body is his own.    - pal med gently asked if it is time to discuss  discontinuing the VAD and  focusing on comfort.   - Hospice introduced in response to family questions.   Wife amenable of having additional information.  Discussed differences in palliative medicine and hospice care.  Philosophy of hospice  in relationship to Mr. Abbott discussed. Patient and family are aware HD would not be continued.  Pal med explained a decision is not needed today.   - Mr. Abbott states he is not amenable to stopping HD and is not amenable to hospice at this time. Patient and family are amenable to continued discussions.   - Follow up in out patient pal  med clinic recommended.  Patient has an appointment scheduled.    - We explored the patient's values and preferences for future care.  The patient and family endorses that what is most important right now is to focus on quality of life, even if it means sacrificing a little time, extending life as long as possible, even it it means sacrificing quality, and curative/life-prolongation (regardless of treatment burdens)    Accordingly, we have decided that the best plan to meet the patient's goals includes continuing with treatment    A total of 20  min was spent on advance care planning, goals of care discussion, emotional support, formulating and communicating prognosis and exploring burden/benefit of various approaches of treatment. This discussion occurred on a fully voluntary basis with the verbal consent of the patient and/or family.       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              I will follow-up with patient. Please contact us if you have any additional questions.    Subjective:     Chief Complaint: No chief complaint on file.      HPI:   HPI obtained from chart review.      As per H and P Mr. Abbott is a 64 yo gentleman with PMH of:  stage D HFrEF 2/2 ICM s/p HM3 as DT (12/1/2023), ESRD on HD (MWF), and debility here for bilateral leg weakness.       Pertinent history below:     5/9-11: Patient was  recently hospitalized for anemia and C-difficile infection. At that time patient received a unit of blood, hydrated for low flow alarms, hydralazine given to target MAPs to 80s with Hydral 50mg TID, then put on extended vancomycin course with taper for discharge on 5/11     He was admitted again for ongoing diarrhea and low-flow alarms from 05/23 - 05/26/2025 and treated with IV fluids.  His last clinic visit with heart transplant Service with Dr. Ocampo on 06/11/2023.  His C diff report was positive on August 2024, 05/10/2025.  He was negative for C diff infection on 05/23/2025, 07/10/2025     Patient was admitted for weakness, diarrhea, loss of appetite with low-flow alarms from 7/10-7/14/25 and repeat  C diff testing on 7/12 was negative also.  He was recommended supportive care with probiotics, and Lomotil.      Patient and wife bedside reports complaints of progressive weakness in both legs for past 1 week associated with loss of appetite.  Patient and wife reports significant improvement in diarrhea since discharge and with Lomotil and supportive care.  Patient reports that he fell on his back a week ago because of weakness.  He also reports he visited emergency room where CAT scan had was done.  Patient's wife is also reporting that she has noticed some speech disturbance for past 1 week also he is dragging his right foot when he has walks.  Patient reports shortness of breath with minimal exertion but denies any active chest pain, lightheadedness, palpitations.  Denies any low-flow alarms at home.  Patient is getting admitted under the heart transplant Service for further management.    Pal med consulted for goals of care and advanced care planning          Hospital Course:  No notes on file    Interval History:     Past Medical History:   Diagnosis Date    Aspiration pneumonia of both lungs 06/17/2024    CAD (coronary artery disease)     CHF (congestive heart failure)     Delirium 06/16/2024    Diabetes  mellitus     HFrEF (heart failure with reduced ejection fraction)     Hypoglycemia 06/12/2024    ICD (implantable cardioverter-defibrillator) in place     LVAD (left ventricular assist device) present 12/01/2023    MI, old     PAF (paroxysmal atrial fibrillation) 10/11/2023    Stage 4 chronic kidney disease 02/15/2024    Type 2 diabetes mellitus without complication, without long-term current use of insulin 10/07/2023       Past Surgical History:   Procedure Laterality Date    ANGIOPLASTY-VENOUS ARTERY Right 12/1/2023    Procedure: ANGIOPLASTY-VENOUS ARTERY, RIGHT FEMORAL;  Surgeon: Yuri Washington MD;  [facility];  Service: Cardiovascular;  Laterality: Right;    AORTIC VALVULOPLASTY N/A 12/1/2023    Procedure: REPAIR, AORTIC VALVE;  Surgeon: Yuri Washington MD;  [facility];  Service: Cardiovascular;  Laterality: N/A;    CARDIAC SURGERY      CLOSURE N/A 12/1/2023    Procedure: CLOSURE, TEMPORARY;  Surgeon: Yuri Washington MD;  [facility];  Service: Cardiovascular;  Laterality: N/A;    DRAINAGE OF PLEURAL EFFUSION  12/4/2023    Procedure: DRAINAGE, PLEURAL EFFUSION;  Surgeon: Yuri Washington MD;  [facility];  Service: Cardiovascular;;    INSERTION OF GRAFT TO PERICARDIUM  12/4/2023    Procedure: INSERTION, GRAFT, PERICARDIUM;  Surgeon: Yuri Washington MD;  [facility];  Service: Cardiovascular;;    INSERTION OF INTRA-AORTIC BALLOON ASSIST DEVICE Right 11/21/2023    Procedure: INSERTION, INTRA-AORTIC BALLOON PUMP;  Surgeon: Finn Cohn MD;  Location: [facility] CATH LAB;  Service: Cardiology;  Laterality: Right;    LEFT VENTRICULAR ASSIST DEVICE Left 12/1/2023    Procedure: INSERTION-LEFT VENTRICULAR ASSIST DEVICE;  Surgeon: Yuri Washington MD;  [facility];  Service: Cardiovascular;  Laterality: Left;  REDO STERNOTOMY - REDO SAW NEEDED FOR CASE    LYSIS OF ADHESIONS  12/1/2023    Procedure: LYSIS, ADHESIONS;  Surgeon: Padmini  MD Yuri;  [address]  Service: Cardiovascular;;    PLACEMENT OF SWAN ROLANDO CATHETER WITH IMAGING GUIDANCE  11/20/2023    Procedure: INSERTION, CATHETER, SWAN-ROLANDO, WITH IMAGING GUIDANCE;  Surgeon: Sajan Hurley MD;  Location: [facility] CATH LAB;  Service: Cardiology;;    REMOVAL OF TUNNELED CENTRAL VENOUS CATHETER (CVC) N/A 3/1/2024    Procedure: REMOVAL, CATHETER, CENTRAL VENOUS, TUNNELED;  Surgeon: Seble Aguilar MD;  Location: [facility] CATH LAB;  Service: Interventional Nephrology;  Laterality: N/A;    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 12/1/2023    Procedure: REPAIR, ANEURYSM, ARTERY, FEMORAL;  Surgeon: Yuri Washington MD;  [address]  Service: Cardiovascular;  Laterality: Right;  Right Femoral Artery Repair    RIGHT HEART CATHETERIZATION Right 10/10/2023    Procedure: INSERTION, CATHETER, RIGHT HEART;  Surgeon: Bin Gandhi MD;  Location: [facility] CATH LAB;  Service: Cardiology;  Laterality: Right;    RIGHT HEART CATHETERIZATION Right 10/13/2023    Procedure: INSERTION, CATHETER, RIGHT HEART;  Surgeon: Walter Mcintyre MD;  Location: [facility] CATH LAB;  Service: Cardiology;  Laterality: Right;    RIGHT HEART CATHETERIZATION  11/13/2023    RIGHT HEART CATHETERIZATION Right 11/13/2023    Procedure: INSERTION, CATHETER, RIGHT HEART;  Surgeon: Juventino Bermudez Jr., MD;  Location: [facility] CATH LAB;  Service: Cardiology;  Laterality: Right;    RIGHT HEART CATHETERIZATION Right 11/20/2023    Procedure: INSERTION, CATHETER, RIGHT HEART;  Surgeon: Sajan Hurley MD;  Location: [facility] CATH LAB;  Service: Cardiology;  Laterality: Right;    RIGHT HEART CATHETERIZATION Right 1/22/2024    Procedure: INSERTION, CATHETER, RIGHT HEART;  Surgeon: Brayan Ocampo MD;  Location: [facility] CATH LAB;  Service: Cardiology;  Laterality: Right;    STERNAL WOUND CLOSURE N/A 12/4/2023    Procedure: CLOSURE, WOUND, STERNUM;  Surgeon: Yuri Washington MD;  Location: [facility] OR [facility];  Service:  Cardiovascular;  Laterality: N/A;    STERNOTOMY N/A 12/1/2023    Procedure: STERNOTOMY, REDO;  Surgeon: Yuri Washington MD;  Location: [facility] OR [address] [place] [address];  Service: Cardiovascular;  Laterality: N/A;    VALVULOPLASTY, MITRAL VALVE N/A 12/1/2023    Procedure: VALVULOPLASTY, MITRAL VALVE;  Surgeon: Yuri Washington MD;  Location: [facility] OR [address] [place] [address];  Service: Cardiovascular;  Laterality: N/A;       Review of patient's allergies indicates:  No Known Allergies    Medications:  Continuous Infusions:  Scheduled Meds:   amiodarone  400 mg Oral Daily    cholestyramine  1 packet Oral BID    hydrALAZINE  75 mg Oral Q8H    Lactobacillus rhamnosus GG  1 capsule Oral Daily    mupirocin   Nasal BID    venlafaxine  37.5 mg Oral Daily    warfarin  1 mg Oral Daily     PRN Meds:  Current Facility-Administered Medications:     dextrose 50%, 12.5 g, Intravenous, PRN    dextrose 50%, 25 g, Intravenous, PRN    diphenoxylate-atropine 2.5-0.025 mg, 2 tablet, Oral, QID PRN    glucagon (human recombinant), 1 mg, Intramuscular, PRN    glucose, 16 g, Oral, PRN    glucose, 24 g, Oral, PRN    insulin aspart U-100, 0-5 Units, Subcutaneous, QID (AC + HS) PRN    traZODone, 25 mg, Oral, Nightly PRN    Family History    None       Tobacco Use    Smoking status: Former     Current packs/day: 0.50     Types: Cigarettes    Smokeless tobacco: Not on file   Vaping Use    Vaping status: Never Used   Substance and Sexual Activity    Alcohol use: Yes     Comment: rarely    Drug use: No    Sexual activity: Not Currently     Partners: Female       Review of Systems   Constitutional:  Positive for activity change, appetite change and unexpected weight change.   Respiratory:  Positive for shortness of breath.    Cardiovascular:  Positive for leg swelling.   Gastrointestinal:  Positive for diarrhea.   Neurological:  Positive for weakness.   Psychiatric/Behavioral:  Negative for agitation, behavioral problems and confusion.      Objective:     Vital Signs (Most  Recent):  Temp: 97.6 °F (36.4 °C) (07/24/25 0710)  Pulse: 79 (07/24/25 1027)  Resp: 18 (07/24/25 0710)  BP: (!) 82/0 (07/24/25 0710)  SpO2: 99 % (07/24/25 0710) Vital Signs (24h Range):  Temp:  [97.5 °F (36.4 °C)-99 °F (37.2 °C)] 97.6 °F (36.4 °C)  Pulse:  [] 79  Resp:  [16-18] 18  SpO2:  [98 %-100 %] 99 %  BP: ()/(0-77) 82/0     Weight: 74.6 kg (164 lb 7.4 oz)  Body mass index is 21.7 kg/m².       Physical Exam  Vitals and nursing note reviewed.   Constitutional:       General: He is not in acute distress.  Cardiovascular:      Rate and Rhythm: Normal rate.      Comments: LVAD hum  Pulmonary:      Effort: Pulmonary effort is normal.   Abdominal:      Tenderness: There is no abdominal tenderness.   Genitourinary:     Comments: Chronic diarrhea  Musculoskeletal:         General: Normal range of motion.      Cervical back: Normal range of motion.   Neurological:      Mental Status: He is alert and oriented to person, place, and time.   Psychiatric:         Mood and Affect: Mood normal.         Behavior: Behavior normal.         Thought Content: Thought content normal.         Judgment: Judgment normal.            Review of Symptoms      Symptom Assessment (ESAS 0-10 Scale)  Pain:  0  Dyspnea:  0  Anxiety:  0  Nausea:  0  Depression:  0  Anorexia:  0  Fatigue:  0  Insomnia:  0  Restlessness:  0  Agitation:  0     CAM / Delirium:  Negative  Constipation:  Negative  Diarrhea:  Positive      Bowel Management Plan (BMP):  Yes      Pain Assessment:  OME in 24 hours:  0  Location(s):      Performance Status:  70    Living Arrangements:  Lives with spouse    Psychosocial/Cultural:   See Palliative Psychosocial Note: Yes  , lives with wife, has adult children and several grandchildren. Worked a  until heart failure disability,  enjoys time with grandchildren.    **Primary  to Follow**  Palliative Care  Consult: Yes    Spiritual:  F - Mavis and Belief:  Bahai   I  "- Importance:  Still attends [employer] services regularly    is the patient's brother-in-law  C - Community:  Not actively involved in community   A - Address in Care:   services offered.            Advance Care Planning           Significant Labs: All pertinent labs within the past 24 hours have been reviewed.  CBC:   Recent Labs   Lab 07/24/25  0530   WBC 3.76*   HGB 10.6*   HCT 32.7*   MCV 90   *     BMP:  No results for input(s): "GLU", "NA", "K", "CL", "CO2", "BUN", "CREATININE", "CALCIUM", "MG" in the last 24 hours.    LFT:  Lab Results   Component Value Date    AST 91 (H) 07/23/2025    ALKPHOS 113 07/23/2025    BILITOT 0.5 07/23/2025     Albumin:   Albumin   Date Value Ref Range Status   07/23/2025 2.1 (L) 3.5 - 5.2 g/dL Final   03/20/2025 2.5 (L) 3.5 - 5.2 g/dL Final     Protein:   Protein Total   Date Value Ref Range Status   07/23/2025 5.6 (L) 6.0 - 8.4 gm/dL Final     Total Protein   Date Value Ref Range Status   03/20/2025 8.0 6.0 - 8.4 g/dL Final     Lactic acid:   Lab Results   Component Value Date    LACTATE 0.8 07/21/2025    LACTATE 1.4 07/11/2025    LACTATE 1.5 07/11/2025       Significant Imaging: I have reviewed all pertinent imaging results/findings within the past 24 hours.    Advance Care Planning    Cheri Montes, CNS  Palliative Medicine  [facility] - Cardiology Stepdown                "

## 2025-07-24 NOTE — ASSESSMENT & PLAN NOTE
65 y.o. Black or  Male ESRD-HD M-W-F presents to ED on 7/21/2025 with diagnosis of: Generalized weakness [R53.1];Failure to thrive in adult [R62.7]   Nephrology consulted for inpatient ESRD-HD management    Outpatient HD Information:  -Dialysis modality: Hemodialysis  -Outpatient HD unit: [facility]  -Nephrologist: ?  -HD TX days: Monday/Wednesday/Friday, duration of treatment: 3 hrs  -Last HD TX prior to hospital admission: 7/23/2025  -Dialysis access: dialysis catheter   -Residual urine: Minium  -EDW: 82.1 kg     Assessment:   - HD completed yesterday. No emergent HD for today. Possible plans for dc home with hospice with or without HD pending further discussion with family and primary team.   - Continue to monitor intake and output  - Please avoid gadolinium, fleets, phos-based laxatives, NSAIDs  - Dialysis thrice weekly unless more urgent indications arise. Will evaluate RRT requirements Daily.  - Increased mortality risk secondary to ESRD on dialysis and HFrEF stage D s/p HM 3  in setting of repeated admissions for BLE weakness, diarrhea, and decrease appetite.   - OP records reviewed; inpatient dialysis orders to be adjusted as needed per OP records.       Anemia of ESRD   Recent Labs   Lab 07/22/25  0339 07/23/25  0413 07/24/25  0530   WBC 4.45 4.31 3.76*   HGB 9.7* 10.4* 10.6*   HCT 30.5* 33.0* 32.7*   * 143* 118*       Lab Results   Component Value Date    FESATURATED 6 (L) 06/12/2024    FERRITIN 4,766.0 (H) 05/24/2025       - Goal in ESRD is Hgb of 10-11. Hgb 10.4. @ target.   - EPO can be administered and dosed per his OP unit upon discharge.  - if patient is on iron infusions please D/C when active infection, cautiously use EPO when hx of malignancy, high BP (SBP usually > 170mmhg).    Mineral Bone Disease in ESRD   Lab Results   Component Value Date    .7 (H) 06/18/2024    CALCIUM 7.3 (L) 07/22/2025    ALBUMIN 2.1 (L) 07/23/2025    CAION 1.16 01/23/2024    PHOS 1.3 (L)  07/22/2025       - F/U PO4, Mg, Calcium. And albumin levels daily.   - Please liberalized diet and protein intake goal 1.5 g/kg/d with 1 L fluid restriction   - If patient has poor oral intake, recommend nephro nutritional shakes (ex Novasource)  - Phos low. HOLD binders.

## 2025-07-24 NOTE — PT/OT/SLP PROGRESS
Occupational Therapy      Patient Name:  Radha Abbott   MRN:  [id number]    Patient not seen today secondary to:     11:50am - LVAD instructor in room and family member at bedside.    2:12pm - Staff from hospice in room and pt asleep.     Pt's plan of care and OT goals reviewed on this date and remain appropriate. Will follow-up for progressive mobility pending continued medical stability and patient participation.       7/24/2025

## 2025-07-24 NOTE — PLAN OF CARE
Pt free of falls and injury. Pt denies any pain or discomfort. Pt AAOx4. Fall precautions remain in place. LVAD hum present and smooth. VAD numbers and dopplers WDL. DLES dressing CDI. Plan of care reviewed with pt.     Problem: Adult Inpatient Plan of Care  Goal: Plan of Care Review  Outcome: Progressing  Goal: Patient-Specific Goal (Individualized)  Outcome: Progressing     Problem: Diabetes Comorbidity  Goal: Blood Glucose Level Within Targeted Range  Outcome: Progressing     Problem: Infection  Goal: Absence of Infection Signs and Symptoms  Outcome: Progressing     Problem: Wound  Goal: Optimal Coping  Outcome: Progressing     Problem: Fall Injury Risk  Goal: Absence of Fall and Fall-Related Injury  Outcome: Progressing     Problem: Skin Injury Risk Increased  Goal: Skin Health and Integrity  Outcome: Progressing     Problem: Ventricular Assist Device  Goal: Optimal Adjustment to Device  Outcome: Progressing  Goal: Absence of Bleeding  Outcome: Progressing     Problem: Coping Ineffective  Goal: Effective Coping  Outcome: Progressing     Problem: Hemodialysis  Goal: Absence of Infection Signs and Symptoms  Outcome: Progressing

## 2025-07-24 NOTE — TREATMENT PLAN
Patient continues as OBS  Home Hospice set up in the works  +/- continuation of HD    Patient will get iHd tomorrow---either overnight or early am --barring emergencies  Spoke with Yolanda in Acute Hd    Plan is to dc home tomorrow with Hospice after Hd

## 2025-07-24 NOTE — NURSING
Patient BS of 61. Gave apples juice x 1 and paeint getting choked on fluid. Gave 12.5 of D50 IV. Will recheck BS.

## 2025-07-24 NOTE — PROGRESS NOTES
Interdisciplinary Rounds Report:   Attended interdisciplinary rounds with the [facility]/CTS services including the LVAD Coordinators, social workers, cardiologists, surgeons,  PT/OT/Speech, dietician, and unit charge nurses. Discussed patient status, plan of care, goals of care, including DC date, and post discharge needs. Plan of care will be discussed with the patient and/or family per the physician while rounding on the floor. This is a recurring meeting that is medically and socially necessary to collaborate with the interdisciplinary team to assist patient needs and safe discharge.

## 2025-07-24 NOTE — PT/OT/SLP PROGRESS
Physical Therapy      Patient Name:  Radha Abbott   MRN:  [id number]    Patient not seen today secondary to LVAD coordinator at bedside upon attempt at 1150 and nursing staff having discussion with pt and family upon follow up at 1412. Pt likely to discharge home with hospice after HD tomorrow. Will follow-up as appropriate.    7/24/2025

## 2025-07-24 NOTE — PROGRESS NOTES
Discharge planning    Due to no home health agency accepting pt, CRISTAL met with spouse with Palliative Care, VC and NP.  Pt asleep during visit. Spouse voices understanding that hospice is medically best option for pt. SW provided spouse with list of 11 [facility] hospices. Spouse requests referrals be sent to [facility], [facility], [facility] and [facility]. SW send referrals for the first 3 via Epic and faxed referral to the 4th. SW awaiting responses.    Spouse tearful and states family is on their way. Support provided. Providing ongoing psychosocial and counseling support, education, resources, assistance and discharge planning as indicated. Following and available.    Addendum: [facility]. [facility] was able to offer 2 dialysis treatments but spouse would have to transport pt to the dialysis unit. Spouse declined the dialysis option. Equipment being delivered to pt's home today. Pt to have dialysis here overnight or early in the morning and then will discharge to home by ambulance tomorrow.

## 2025-07-24 NOTE — PROCEDURES
"Radha Abbott is a 65 y.o. male patient.    Temp: 97.6 °F (36.4 °C) (07/24/25 0710)  Pulse: 84 (07/24/25 0710)  Resp: 18 (07/24/25 0710)  BP: (!) 90/0 (07/24/25 0415)  SpO2: 99 % (07/24/25 0710)  Weight: 74.6 kg (164 lb 7.4 oz) (07/24/25 0415)  Height: 6' 1" (185.4 cm) (07/22/25 0517)    Procedures        7/24/2025     4:16 AM 7/23/2025    11:17 PM 7/23/2025     9:38 PM 7/23/2025     3:30 PM 7/23/2025    12:30 PM 7/23/2025     7:10 AM 7/23/2025     4:34 AM   TXP LVAD INTERROGATIONS   Type HeartMate3 HeartMate3 HeartMate3 HeartMate3 HeartMate3 HeartMate3 HeartMate3   Flow 3.3 3.3 3.3 3.9 3.2 3.3 3.2   Speed 5000 5000 5000 5000 5000 5000 5050   PI 6.5 6.7 7.4 4.8 7.3 7.4 7.3   Power ([place]) 3.4 3.4 3.4 3.3 3.5 3.5 3.5   LSL 4600 4600 4600 1600 4600 4600 4600   Low Flow Alarm     no no no   Pulsatility Intermittent pulse Intermittent pulse Intermittent pulse    Pulse     Interrogation of Ventricular assist device was performed with physician analysis of device parameters and review of device function. I have personally reviewed the interrogation findings and agree with findings as stated.     7/24/2025    "

## 2025-07-24 NOTE — PROGRESS NOTES
[facility] - Cardiology Stepdown  Nephrology  Progress Note    Patient Name: Radha Abbott  MRN: [id number]  Admission Date: 7/21/2025  Hospital Length of Stay: 0 days  Attending Provider: Juventino Bermudez Jr.*   Primary Care Physician: Vasu Kong MD  Principal Problem:Failure to thrive in adult    Subjective:     HPI: The patient is a 65 y.o. Black or  Male with multiple co morbidities including stage D HFrEF 2/2 ICM s/p HM3 as DT (12/1/2023), ESRD on HD (MWF), and debility who presents to ED on 7/21/2025 with complaints of bilateral leg weakness. Of note, the patient has been hospitalized multiple times w similar complaints including C -difficile infection and low-flow alarms. Wife reports progressive LE weakness x 1 week and decreased appetite. Denies complaints of shortness of breath and CP. The patient is MWF dialysis patient and was last dialyzed yesterday. Nephrology consulted for management of ESRD and HD treatment.    Interval History: HD completed yesterday with 0.5 L removed. No distress today. Team at bedside discussing plan for discharge home with hospice.     Review of patient's allergies indicates:  No Known Allergies  Current Facility-Administered Medications   Medication Frequency    amiodarone tablet 400 mg Daily    cholestyramine 4 gram packet 4 g BID    dextrose 50% injection 12.5 g PRN    dextrose 50% injection 25 g PRN    diphenoxylate-atropine 2.5-0.025 mg per tablet 2 tablet QID PRN    glucagon (human recombinant) injection 1 mg PRN    glucose chewable tablet 16 g PRN    glucose chewable tablet 24 g PRN    hydrALAZINE tablet 75 mg Q8H    insulin aspart U-100 pen 0-5 Units QID (AC + HS) PRN    Lactobacillus rhamnosus GG capsule 1 capsule Daily    mupirocin 2 % ointment BID    trazodone split tablet 25 mg Nightly PRN    venlafaxine tablet 37.5 mg Daily    warfarin (COUMADIN) tablet 1 mg Daily       Objective:     Vital Signs (Most Recent):  Temp: 97.6 °F (36.4 °C)  (07/24/25 0710)  Pulse: 79 (07/24/25 1027)  Resp: 18 (07/24/25 0710)  BP: (!) 82/0 (07/24/25 0710)  SpO2: 99 % (07/24/25 0710) Vital Signs (24h Range):  Temp:  [97.5 °F (36.4 °C)-99 °F (37.2 °C)] 97.6 °F (36.4 °C)  Pulse:  [] 79  Resp:  [16-18] 18  SpO2:  [98 %-100 %] 99 %  BP: ()/(0-77) 82/0     Weight: 74.6 kg (164 lb 7.4 oz) (07/24/25 0415)  Body mass index is 21.7 kg/m².  Body surface area is 1.96 meters squared.    I/O last 3 completed shifts:  In: 480 [P.O.:480]  Out: 100 [Other:100]     Physical Exam  Vitals and nursing note reviewed.   Constitutional:       General: He is not in acute distress.  Cardiovascular:      Rate and Rhythm: Normal rate.      Comments: LVAD hum  Pulmonary:      Effort: Pulmonary effort is normal.   Abdominal:      Tenderness: There is no abdominal tenderness.   Musculoskeletal:         General: Normal range of motion.      Cervical back: Normal range of motion.   Neurological:      Mental Status: He is alert and oriented to person, place, and time.   Psychiatric:         Mood and Affect: Mood normal.         Behavior: Behavior normal.          Significant Labs:  CBC:   Recent Labs   Lab 07/24/25  0530   WBC 3.76*   RBC 3.64*   HGB 10.6*   HCT 32.7*   *   MCV 90   MCH 29.1   MCHC 32.4     CMP:   Recent Labs   Lab 07/22/25  0339 07/23/25  0413   GLU 72  --    CALCIUM 7.3*  --    ALBUMIN 2.0* 2.1*   PROT 5.3* 5.6*   *  --    K 3.3*  --    CO2 23  --      --    BUN 18  --    CREATININE 3.7*  --    ALKPHOS 114 113   ALT 69* 69*   AST 98* 91*   BILITOT 0.5 0.5     All labs within the past 24 hours have been reviewed.     Assessment/Plan:     Cardiac/Vascular  LVAD (left ventricular assist device) present  - defer to primary team     Renal/  ESRD (end stage renal disease)  65 y.o. Black or  Male ESRD-HD M-W-F presents to ED on 7/21/2025 with diagnosis of: Generalized weakness [R53.1];Failure to thrive in adult [R62.7]   Nephrology consulted  for inpatient ESRD-HD management    Outpatient HD Information:  -Dialysis modality: Hemodialysis  -Outpatient HD unit: [facility]  -Nephrologist: ?  -HD TX days: Monday/Wednesday/Friday, duration of treatment: 3 hrs  -Last HD TX prior to hospital admission: 7/23/2025  -Dialysis access: dialysis catheter   -Residual urine: Minium  -EDW: 82.1 kg     Assessment:   - HD completed yesterday. No emergent HD for today. Possible plans for dc home with hospice with or without HD pending further discussion with family and primary team.   - Will plan HD tomorrow prior to possible dc tomorrow.   - Continue to monitor intake and output  - Please avoid gadolinium, fleets, phos-based laxatives, NSAIDs  - Dialysis thrice weekly unless more urgent indications arise. Will evaluate RRT requirements Daily.  - Increased mortality risk secondary to ESRD on dialysis and HFrEF stage D s/p HM 3  in setting of repeated admissions for BLE weakness, diarrhea, and decrease appetite.   - OP records reviewed; inpatient dialysis orders to be adjusted as needed per OP records.       Anemia of ESRD   Recent Labs   Lab 07/22/25  0339 07/23/25  0413 07/24/25  0530   WBC 4.45 4.31 3.76*   HGB 9.7* 10.4* 10.6*   HCT 30.5* 33.0* 32.7*   * 143* 118*       Lab Results   Component Value Date    FESATURATED 6 (L) 06/12/2024    FERRITIN 4,766.0 (H) 05/24/2025       - Goal in ESRD is Hgb of 10-11. Hgb 10.4. @ target.   - EPO can be administered and dosed per his OP unit upon discharge.  - if patient is on iron infusions please D/C when active infection, cautiously use EPO when hx of malignancy, high BP (SBP usually > 170mmhg).    Mineral Bone Disease in ESRD   Lab Results   Component Value Date    .7 (H) 06/18/2024    CALCIUM 7.3 (L) 07/22/2025    ALBUMIN 2.1 (L) 07/23/2025    CAION 1.16 01/23/2024    PHOS 1.3 (L) 07/22/2025       - F/U PO4, Mg, Calcium. And albumin levels daily.   - Please liberalized diet and protein intake goal 1.5 g/kg/d with 1 L  fluid restriction   - If patient has poor oral intake, recommend nephro nutritional shakes (ex Novasource)  - Phos low. HOLD binders.     Endocrine  * Failure to thrive in adult  - defer to primary team         Thank you for your consult. I will follow-up with patient. Please contact us if you have any additional questions.    Matilda Hinton DNP, FNP-C  Nephrology  [facility] - Cardiology Stepdown

## 2025-07-24 NOTE — PLAN OF CARE
[facility]   Heart Transplant/VAD Clinic   [address] [place] [address]   [phone] after hours          [phone] fax     HOSPICE ORDERS  Admit to Home Hospice    Hospice Qualifying Diagnoses:          Patient has a life expectancy < 6 months due to:  Primary Hospice Diagnosis: debility, failure to thrive  Comorbid Conditions Contributing to Decline:  HFrEF, ESRD, LVAD, malnutrition,    Diet: As Tolerated.     Acitivities: As Tolerated    Nursing: Per Hospice Routine.    **If LVAD: **LVAD driveline exit site dressing change is to be completed per LVAD patient/caregiver only**.    MEDICATIONS:  Hospice to manage warfarin dosing.   Transition to comfort medications when appropriate.        Medication List        START taking these medications      cholestyramine 4 gram packet  Commonly known as: QUESTRAN  Take 1 packet (4 g total) by mouth 2 (two) times daily. Take second dose at least 3 hours apart from warfarin            CHANGE how you take these medications      warfarin 1 MG tablet  Commonly known as: COUMADIN  Take 1 tablet (1 mg total) by mouth Daily.  What changed: See the new instructions.            CONTINUE taking these medications      amiodarone 400 MG tablet  Commonly known as: PACERONE  Take 1 tablet (400 mg total) by mouth once daily.     diphenoxylate-atropine 2.5-0.025 mg 2.5-0.025 mg per tablet  Commonly known as: LOMOTIL  Take 2 tablets by mouth 4 (four) times daily as needed for Diarrhea.     hydrALAZINE 25 MG tablet  Commonly known as: APRESOLINE  Take 3 tablets (75 mg total) by mouth every 8 (eight) hours.     Lactobacillus rhamnosus GG 10 billion cell capsule  Commonly known as: CULTURELLE  Take 1 capsule by mouth once daily.     miscellaneous medical supply Kit  by Apply Externally route 2 (two) times a day. Use twice daily at 8 AM and 3 PM and record the value in [employer] as directed.     potassium, sodium phosphates 280-160-250  mg Pwpk  Commonly known as: PHOS-NAK  Take 1 packet by mouth 4 (four) times daily before meals and nightly.     traZODone 50 MG tablet  Commonly known as: DESYREL  Take 0.5 tablets (25 mg total) by mouth nightly as needed for Insomnia.     venlafaxine 37.5 MG Tab  Commonly known as: EFFEXOR  Take 1 tablet (37.5 mg total) by mouth once daily.     vitamin D 1000 units Tab  Commonly known as: VITAMIN D3  Take 1 tablet by mouth once daily               Where to Get Your Medications        These medications were sent to [facility] [address], [place] [address]      Hours: Always Open Phone: [phone]   cholestyramine 4 gram packet       Information about where to get these medications is not yet available    Ask your nurse or doctor about these medications  warfarin 1 MG tablet          Send initial orders to [facility] attending physician on call.  Send follow up questions to [facility] clinic MD [phone] or [phone].

## 2025-07-24 NOTE — ASSESSMENT & PLAN NOTE
Everton magdaleno consulted for goals of care and advanced care planning for Mr. Abbott a 66 yo gentleman with PMH of tsage D HFrEF 2/2 ICM s/p HM3 as DT (12/1/2023), ESRD on HD (MWF). Admitted to [facility] with debility -  bilateral leg weakness and concerns for failure to thrive.  At time of this encounter Mr. Abbott  is unable to participate in conversation.  Patient appears to be comfortable with no behavioral signs of pain, shortness of breath or acute distress.  Wife is at bedside.     Advance Care Planning     Date: 07/24/2025    Today a voluntary meeting took place: bedside    Patient Participation: Patient is unable to participate     Attendees (Name and  Relationship to patient): Legal surrogate decision-maker: veronika Abbott [phone]     Staff attendees (Name and  Role): SARAH Green, Primary team NP, GERMAN Barakat, primary team LCSW,  KYAW Michel VAD coordinator and CAROLINA Marinelli palliative medicine     ACP Conversation (General): Understanding of advance care planning and role of health care agent defined wife states complete understanding  she is the legal next of kin as Mr. Abbott has become debilitated and confused..    Understanding of current condition wife states understanding the current clinical condition and that the patient does not have any additional heart failure treatment options.  In previous encounters wife states understanding VAD is for DT and understands he will die from the heart disease   Wife indicates she had been hoping to bring Mr. Abbott home and continue with home health services.  GERMAN Barakat LCSW explained the current situation and several attempts to resume home health services.  Alternatives to home health also discussed.  LISA Montes explained clinical condition is appropriate to discuss hospice as a treatment option at the end of life.     and APRN explained philosophy of hospice - ensures comfort and quality of life,  also explained criteria for hospice and discontinuation of HD,   Explained in some circumstances hospice will provide  a limited HD sessions or they will not provide HD sessions.  This is usually a case by case discussion   - Mrs. Abbott states it is her wish for her  to be home regardless of continuing or discontinuing HD. Explained home hospice will continue the VAD  Primary  provided patient with list of hospice choices in the [facility] and Mrs. Abbott selected four hospice agencies.       Code Status: Full Code, would recommend having a DNR order but is not required for home hospice.  Home hospice agency will also continue these conversations     ACP Documents: Reviewed current existing digital forms    Goals of care: The family endorses that what is most important right now is to focus on symptom/pain control, quality of life, even if it means sacrificing a little time, and comfort and QOL     Accordingly, we have decided that the best plan to meet the patient's goals includes enrolling in hospice care and pivot to comfort-focused care      Recommendations/  Follow-up tasks: Other (specify below) initiate transition to home hospice. Primary  completing referral process       Length of ACP   conversation in minutes: A total of 25  min was spent on advance care planning, goals of care discussion, emotional support, formulating and communicating prognosis and exploring burden/benefit of various approaches of treatment. This discussion occurred on a fully voluntary basis with the verbal consent of the patient and/or family.         Pal rasta APRN and LCSW met with patient and family at bedside.  Pal med introduced   Advance Care Planning     Date: 07/22/2025    ACP Reviewed/No Changes  Voluntary advance care planning discussion had today with patient and spouse. Previously completed HCPOA in electronic medical record is current, no changes made.    Advance Care Planning     Date: 07/22/2025    [facility]  - Pal med re-introduced as patient. Last pal  med encounter during VAD preparedness.  Wife states understanding VAD was for DT   - Mr. Abbott did not appear to have the same understanding  of what DT means for him.    - During this conversation we discussed goals of care going forward , in light of the patient's change in clinical status,  -pal med expressed concern with the significant changes in clinical status - HD, debility, unexpected weight loss, poor nutritional status and repeat hospitalizations for similar complaints is leading to adult failure to thrive.    -  Pal med asked patient and wife if the benefit of VAD outweighs the impact on quality of life.   Patient indicates he is unable to spend as much time with his grandchildren. He also indicates the VAD has been working great and that only half of his body is his own.    - pal med gently asked if it is time to discuss  discontinuing the VAD and  focusing on comfort.   - Hospice introduced in response to family questions.   Wife amenable of having additional information.  Discussed differences in palliative medicine and hospice care.  Philosophy of hospice  in relationship to Mr. Abbott discussed. Patient and family are aware HD would not be continued.  Pal med explained a decision is not needed today.   - Mr. Abbott states he is not amenable to stopping HD and is not amenable to hospice at this time. Patient and family are amenable to continued discussions.   - Follow up in out patient pal med clinic recommended.  Patient has an appointment scheduled.    - We explored the patient's values and preferences for future care.  The patient and family endorses that what is most important right now is to focus on quality of life, even if it means sacrificing a little time, extending life as long as possible, even it it means sacrificing quality, and curative/life-prolongation (regardless of treatment burdens)    Accordingly, we have decided that the best plan to meet the patient's goals includes continuing with  treatment    A total of 20  min was spent on advance care planning, goals of care discussion, emotional support, formulating and communicating prognosis and exploring burden/benefit of various approaches of treatment. This discussion occurred on a fully voluntary basis with the verbal consent of the patient and/or family.       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

## 2025-07-24 NOTE — SUBJECTIVE & OBJECTIVE
Interval History:  No acute events noted overnight.  Patient denies CP, SOB, dizziness, lightheadedness, palpitations.  Per the wife, patient did have multiple BMs but were more formed.  Wife states the patient was restless but other than that had an okay night. Wife is wanting to transition patient to home hospice so her the patient can go home.    Continuous Infusions:  Scheduled Meds:   amiodarone  400 mg Oral Daily    cholestyramine  1 packet Oral BID    droNABinol  2.5 mg Oral BID    hydrALAZINE  75 mg Oral Q8H    Lactobacillus rhamnosus GG  1 capsule Oral Daily    mupirocin   Nasal BID    venlafaxine  37.5 mg Oral Daily    warfarin  1 mg Oral Daily     PRN Meds:  Current Facility-Administered Medications:     dextrose 50%, 12.5 g, Intravenous, PRN    dextrose 50%, 25 g, Intravenous, PRN    diphenoxylate-atropine 2.5-0.025 mg, 2 tablet, Oral, QID PRN    glucagon (human recombinant), 1 mg, Intramuscular, PRN    glucose, 16 g, Oral, PRN    glucose, 24 g, Oral, PRN    insulin aspart U-100, 0-5 Units, Subcutaneous, QID (AC + HS) PRN    traZODone, 25 mg, Oral, Nightly PRN    Review of patient's allergies indicates:  No Known Allergies  Objective:     Vital Signs (Most Recent):  Temp: 97.6 °F (36.4 °C) (07/24/25 0710)  Pulse: 84 (07/24/25 0710)  Resp: 18 (07/24/25 0710)  BP: (!) 90/0 (07/24/25 0415)  SpO2: 99 % (07/24/25 0710) Vital Signs (24h Range):  Temp:  [97.5 °F (36.4 °C)-99 °F (37.2 °C)] 97.6 °F (36.4 °C)  Pulse:  [] 84  Resp:  [16-18] 18  SpO2:  [98 %-100 %] 99 %  BP: ()/(0-77) 90/0     Patient Vitals for the past 72 hrs (Last 3 readings):   Weight   07/24/25 0415 74.6 kg (164 lb 7.4 oz)   07/23/25 0500 77.9 kg (171 lb 11.8 oz)   07/22/25 0517 76.4 kg (168 lb 6.9 oz)     Body mass index is 21.7 kg/m².      Intake/Output Summary (Last 24 hours) at 7/24/2025 0842  Last data filed at 7/23/2025 2004  Gross per 24 hour   Intake 120 ml   Output 100 ml   Net 20 ml       Hemodynamic Parameters:           Physical Exam  Constitutional:       Appearance: He is ill-appearing.   HENT:      Mouth/Throat:      Mouth: Mucous membranes are moist.   Eyes:      Pupils: Pupils are equal, round, and reactive to light.   Cardiovascular:      Rate and Rhythm: Normal rate.      Heart sounds: No murmur heard.     No friction rub. No gallop.   Pulmonary:      Effort: Pulmonary effort is normal.      Breath sounds: Normal breath sounds.   Abdominal:      General: There is no distension.      Palpations: Abdomen is soft.      Tenderness: There is no abdominal tenderness. There is no guarding.   Musculoskeletal:      Right lower leg: No edema.      Left lower leg: No edema.   Skin:     General: Skin is warm.      Capillary Refill: Capillary refill takes less than 2 seconds.   Neurological:      Mental Status: He is easily aroused. He is lethargic and confused.            Significant Labs:  CBC:  Recent Labs   Lab 07/22/25 0339 07/23/25 0413 07/24/25  0530   WBC 4.45 4.31 3.76*   RBC 3.36* 3.65* 3.64*   HGB 9.7* 10.4* 10.6*   HCT 30.5* 33.0* 32.7*   * 143* 118*   MCV 91 90 90   MCH 28.9 28.5 29.1   MCHC 31.8* 31.5* 32.4     BNP:  Recent Labs   Lab 07/23/25 0413   BNP 1,415*     CMP:  Recent Labs   Lab 07/21/25  1547 07/22/25 0339 07/23/25 0413   GLU 81 72  --    CALCIUM 7.7* 7.3*  --    ALBUMIN 2.2* 2.0* 2.1*   PROT 6.1 5.3* 5.6*   * 135*  --    K 3.2* 3.3*  --    CO2 26 23  --     105  --    BUN 12 18  --    CREATININE 3.2* 3.7*  --    ALKPHOS 119 114 113   ALT 80* 69* 69*   * 98* 91*   BILITOT 0.6 0.5 0.5      Coagulation:   Recent Labs   Lab 07/22/25 0339 07/23/25 0413 07/24/25  0530   INR 2.0* 1.9* 1.7*   APTT 41.7* 36.7* 31.8     LDH:  Recent Labs   Lab 07/22/25  0339   *     Microbiology:  Microbiology Results (last 7 days)       Procedure Component Value Units Date/Time    Blood culture [[id number]]  (Normal) Collected: 07/22/25 1049    Order Status: Completed Specimen: Blood from  Peripheral, Lower Arm, Left Updated: 07/24/25 0102     Blood Culture No Growth After 36 Hours    Blood culture [[id number]]  (Normal) Collected: 07/22/25 1049    Order Status: Completed Specimen: Blood from Peripheral, Hand, Left Updated: 07/24/25 0102     Blood Culture No Growth After 36 Hours            I have reviewed all pertinent labs within the past 24 hours.    Estimated Creatinine Clearance: 21.6 mL/min (A) (based on SCr of 3.7 mg/dL (H)).    Diagnostic Results:  I have reviewed all pertinent imaging results/findings within the past 24 hours.

## 2025-07-24 NOTE — ASSESSMENT & PLAN NOTE
Procedure: Device Interrogation Including analysis of device parameters  Current Settings: Ventricular Assist Device  Review of device function is stable/unstable stable        7/24/2025     4:16 AM 7/23/2025    11:17 PM 7/23/2025     9:38 PM 7/23/2025     3:30 PM 7/23/2025    12:30 PM 7/23/2025     7:10 AM 7/23/2025     4:34 AM   TXP LVAD INTERROGATIONS   Type HeartMate3 HeartMate3 HeartMate3 HeartMate3 HeartMate3 HeartMate3 HeartMate3   Flow 3.3 3.3 3.3 3.9 3.2 3.3 3.2   Speed 5000 5000 5000 5000 5000 5000 5050   PI 6.5 6.7 7.4 4.8 7.3 7.4 7.3   Power (Carrington) 3.4 3.4 3.4 3.3 3.5 3.5 3.5   LSL 4600 4600 4600 1600 4600 4600 4600   Low Flow Alarm     no no no   Pulsatility Intermittent pulse Intermittent pulse Intermittent pulse    Pulse

## 2025-07-24 NOTE — PROGRESS NOTES
07/24/2025  Yonathan Osullivan    Current provider:  Walter Mcintyre MD    Device interrogation:      7/24/2025     4:16 AM 7/23/2025    11:17 PM 7/23/2025     9:38 PM 7/23/2025     3:30 PM 7/23/2025    12:30 PM 7/23/2025     7:10 AM 7/23/2025     4:34 AM   TXP LVAD INTERROGATIONS   Type HeartMate3 HeartMate3 HeartMate3 HeartMate3 HeartMate3 HeartMate3 HeartMate3   Flow 3.3 3.3 3.3 3.9 3.2 3.3 3.2   Speed 5000 5000 5000 5000 5000 5000 5050   PI 6.5 6.7 7.4 4.8 7.3 7.4 7.3   Power (Carrington) 3.4 3.4 3.4 3.3 3.5 3.5 3.5   LSL 4600 4600 4600 1600 4600 4600 4600   Low Flow Alarm     no no no   Pulsatility Intermittent pulse Intermittent pulse Intermittent pulse    Pulse          Rounded on [facility] to ensure all mechanical assist device settings (IABP or VAD) were appropriate and all parameters were within limits.  I was able to ensure all back up equipment was present, the staff had no issues, and the Perfusion Department daily rounding was complete.      For implantable VADs: Interrogation of Ventricular assist device was performed with analysis of device parameters and review of device function. I have personally reviewed the interrogation findings and agree with findings as stated.     In emergency, the nursing units have been notified to contact the perfusion department either by:  Calling [phone] from 630am to 4pm Mon thru Fri, utilizing the On-Call Finder functionality of Epic and searching for Perfusion, or by contacting the hospital  from 4pm to 630am and on weekends and asking to speak with the perfusionist on call.    6:52 AM

## 2025-07-24 NOTE — PROGRESS NOTES
[facility] - Cardiology Stepdown  Heart Transplant  Progress Note    Patient Name: Radha Abbott  MRN: [id number]  Admission Date: 7/21/2025  Hospital Length of Stay: 0 days  Attending Physician: Juventino Bermudez Jr.*  Primary Care Provider: Vasu Kong MD  Principal Problem:Failure to thrive in adult    Subjective:   Interval History:  No acute events noted overnight.  Patient denies CP, SOB, dizziness, lightheadedness, palpitations.  Per the wife, patient did have multiple BMs but were more formed.  Wife states the patient was restless but other than that had an okay night. Wife is wanting to transition patient to home hospice so her the patient can go home.    Continuous Infusions:  Scheduled Meds:   amiodarone  400 mg Oral Daily    cholestyramine  1 packet Oral BID    droNABinol  2.5 mg Oral BID    hydrALAZINE  75 mg Oral Q8H    Lactobacillus rhamnosus GG  1 capsule Oral Daily    mupirocin   Nasal BID    venlafaxine  37.5 mg Oral Daily    warfarin  1 mg Oral Daily     PRN Meds:  Current Facility-Administered Medications:     dextrose 50%, 12.5 g, Intravenous, PRN    dextrose 50%, 25 g, Intravenous, PRN    diphenoxylate-atropine 2.5-0.025 mg, 2 tablet, Oral, QID PRN    glucagon (human recombinant), 1 mg, Intramuscular, PRN    glucose, 16 g, Oral, PRN    glucose, 24 g, Oral, PRN    insulin aspart U-100, 0-5 Units, Subcutaneous, QID (AC + HS) PRN    traZODone, 25 mg, Oral, Nightly PRN    Review of patient's allergies indicates:  No Known Allergies  Objective:     Vital Signs (Most Recent):  Temp: 97.6 °F (36.4 °C) (07/24/25 0710)  Pulse: 84 (07/24/25 0710)  Resp: 18 (07/24/25 0710)  BP: (!) 90/0 (07/24/25 0415)  SpO2: 99 % (07/24/25 0710) Vital Signs (24h Range):  Temp:  [97.5 °F (36.4 °C)-99 °F (37.2 °C)] 97.6 °F (36.4 °C)  Pulse:  [] 84  Resp:  [16-18] 18  SpO2:  [98 %-100 %] 99 %  BP: ()/(0-77) 90/0     Patient Vitals for the past 72 hrs (Last 3 readings):   Weight   07/24/25 0415 74.6 kg  (164 lb 7.4 oz)   07/23/25 0500 77.9 kg (171 lb 11.8 oz)   07/22/25 0517 76.4 kg (168 lb 6.9 oz)     Body mass index is 21.7 kg/m².      Intake/Output Summary (Last 24 hours) at 7/24/2025 0842  Last data filed at 7/23/2025 2004  Gross per 24 hour   Intake 120 ml   Output 100 ml   Net 20 ml       Hemodynamic Parameters:          Physical Exam  Constitutional:       Appearance: He is ill-appearing.   HENT:      Mouth/Throat:      Mouth: Mucous membranes are moist.   Eyes:      Pupils: Pupils are equal, round, and reactive to light.   Cardiovascular:      Rate and Rhythm: Normal rate.      Heart sounds: No murmur heard.     No friction rub. No gallop.   Pulmonary:      Effort: Pulmonary effort is normal.      Breath sounds: Normal breath sounds.   Abdominal:      General: There is no distension.      Palpations: Abdomen is soft.      Tenderness: There is no abdominal tenderness. There is no guarding.   Musculoskeletal:      Right lower leg: No edema.      Left lower leg: No edema.   Skin:     General: Skin is warm.      Capillary Refill: Capillary refill takes less than 2 seconds.   Neurological:      Mental Status: He is easily aroused. He is lethargic and confused.            Significant Labs:  CBC:  Recent Labs   Lab 07/22/25  0339 07/23/25  0413 07/24/25  0530   WBC 4.45 4.31 3.76*   RBC 3.36* 3.65* 3.64*   HGB 9.7* 10.4* 10.6*   HCT 30.5* 33.0* 32.7*   * 143* 118*   MCV 91 90 90   MCH 28.9 28.5 29.1   MCHC 31.8* 31.5* 32.4     BNP:  Recent Labs   Lab 07/23/25  0413   BNP 1,415*     CMP:  Recent Labs   Lab 07/21/25  1547 07/22/25  0339 07/23/25  0413   GLU 81 72  --    CALCIUM 7.7* 7.3*  --    ALBUMIN 2.2* 2.0* 2.1*   PROT 6.1 5.3* 5.6*   * 135*  --    K 3.2* 3.3*  --    CO2 26 23  --     105  --    BUN 12 18  --    CREATININE 3.2* 3.7*  --    ALKPHOS 119 114 113   ALT 80* 69* 69*   * 98* 91*   BILITOT 0.6 0.5 0.5      Coagulation:   Recent Labs   Lab 07/22/25  0339 07/23/25  0413  07/24/25  0530   INR 2.0* 1.9* 1.7*   APTT 41.7* 36.7* 31.8     LDH:  Recent Labs   Lab 07/22/25  0339   *     Microbiology:  Microbiology Results (last 7 days)       Procedure Component Value Units Date/Time    Blood culture [[id number]]  (Normal) Collected: 07/22/25 1049    Order Status: Completed Specimen: Blood from Peripheral, Lower Arm, Left Updated: 07/24/25 0102     Blood Culture No Growth After 36 Hours    Blood culture [[id number]]  (Normal) Collected: 07/22/25 1049    Order Status: Completed Specimen: Blood from Peripheral, Hand, Left Updated: 07/24/25 0102     Blood Culture No Growth After 36 Hours            I have reviewed all pertinent labs within the past 24 hours.    Estimated Creatinine Clearance: 21.6 mL/min (A) (based on SCr of 3.7 mg/dL (H)).    Diagnostic Results:  I have reviewed all pertinent imaging results/findings within the past 24 hours.  Assessment and Plan:     No notes on file    * Failure to thrive in adult  -Repeated admissions for BLE weakness, diarrhea.  Patient is still having issues with decrease in appetite, with appetite stimulant prescribed at home.  Patient also having continued diarrhea since being admitted for C diff infection 5/9.  Patient placed on probiotics Lomotil to help slow down diarrhea.  -Palliative care consulted for [facility] conversation. Plan to transition patient to home hospice.  -continue Megace outpatient for appetite stimulation  -Continue Questran 4 g p.o. b.i.d. to help with diarrhea.  -Continue  Nepro to increased caloric intake  -See PT/OT note for mobility and functional living recommendations    Chronic combined systolic and diastolic CHF, NYHA class 4  -HFrEF stage D s/p  3 (01/2023). TTE 04/2025 reveals LV EF 10-15%, LVAD 5000 rpm, LVIDd 5.5 cm, trace TR  -Continue hydralazine 75 mg every 8 hours  -Diuresis:  Fluid management per Nephrology.  Patient on HD (m,W,F). HD performed yesterday  -Continue to monitor fluid status, strict I's and O,  daily weight    LVAD (left ventricular assist device) present  Procedure: Device Interrogation Including analysis of device parameters  Current Settings: Ventricular Assist Device  Review of device function is stable/unstable stable        7/24/2025     4:16 AM 7/23/2025    11:17 PM 7/23/2025     9:38 PM 7/23/2025     3:30 PM 7/23/2025    12:30 PM 7/23/2025     7:10 AM 7/23/2025     4:34 AM   TXP LVAD INTERROGATIONS   Type HeartMate3 HeartMate3 HeartMate3 HeartMate3 HeartMate3 HeartMate3 HeartMate3   Flow 3.3 3.3 3.3 3.9 3.2 3.3 3.2   Speed 5000 5000 5000 5000 5000 5000 5050   PI 6.5 6.7 7.4 4.8 7.3 7.4 7.3   Power (Carrington) 3.4 3.4 3.4 3.3 3.5 3.5 3.5   LSL 4600 4600 4600 1600 4600 4600 4600   Low Flow Alarm     no no no   Pulsatility Intermittent pulse Intermittent pulse Intermittent pulse    Pulse         PAF (paroxysmal atrial fibrillation)  -Telemetry reveals heart rate less than 100  -Continue amiodarone 400 mg oral daily for rhythm control  -Continue warfarin. INR therapeutic. Goal INR 2-3  -Electrolyte management per Nephrology    ESRD (end stage renal disease)  -On HD (M, W, F).  Kurt cath, right subclavian artery  -Nephrology consulted for renal function management        Frank Green, JUAN-BC  Heart Transplant  [facility] - Cardiology Stepdown

## 2025-07-24 NOTE — SUBJECTIVE & OBJECTIVE
Interval History:     Past Medical History:   Diagnosis Date    Aspiration pneumonia of both lungs 06/17/2024    CAD (coronary artery disease)     CHF (congestive heart failure)     Delirium 06/16/2024    Diabetes mellitus     HFrEF (heart failure with reduced ejection fraction)     Hypoglycemia 06/12/2024    ICD (implantable cardioverter-defibrillator) in place     LVAD (left ventricular assist device) present 12/01/2023    MI, old     PAF (paroxysmal atrial fibrillation) 10/11/2023    Stage 4 chronic kidney disease 02/15/2024    Type 2 diabetes mellitus without complication, without long-term current use of insulin 10/07/2023       Past Surgical History:   Procedure Laterality Date    ANGIOPLASTY-VENOUS ARTERY Right 12/1/2023    Procedure: ANGIOPLASTY-VENOUS ARTERY, RIGHT FEMORAL;  Surgeon: Yuri Washington MD;  [facility]  Service: Cardiovascular;  Laterality: Right;    AORTIC VALVULOPLASTY N/A 12/1/2023    Procedure: REPAIR, AORTIC VALVE;  Surgeon: Yuri Washington MD;  [facility];  Service: Cardiovascular;  Laterality: N/A;    CARDIAC SURGERY      CLOSURE N/A 12/1/2023    Procedure: CLOSURE, TEMPORARY;  Surgeon: Yuri Washington MD;  [facility]: [facility] OR [facility];  Service: Cardiovascular;  Laterality: N/A;    DRAINAGE OF PLEURAL EFFUSION  12/4/2023    Procedure: DRAINAGE, PLEURAL EFFUSION;  Surgeon: Yuri Washington MD;  Location: [facility] OR [facility];  Service: Cardiovascular;;    INSERTION OF GRAFT TO PERICARDIUM  12/4/2023    Procedure: INSERTION, GRAFT, PERICARDIUM;  Surgeon: Yuri Washington MD;  [facility];  Service: Cardiovascular;;    INSERTION OF INTRA-AORTIC BALLOON ASSIST DEVICE Right 11/21/2023    Procedure: INSERTION, INTRA-AORTIC BALLOON PUMP;  Surgeon: Finn Cohn MD;  Location: [facility] CATH LAB;  Service: Cardiology;  Laterality: Right;    LEFT VENTRICULAR ASSIST DEVICE Left 12/1/2023    Procedure: INSERTION-LEFT VENTRICULAR ASSIST DEVICE;  Surgeon: Padmini  MD Yuri;  [address]  Service: Cardiovascular;  Laterality: Left;  REDO STERNOTOMY - REDO SAW NEEDED FOR CASE    LYSIS OF ADHESIONS  12/1/2023    Procedure: LYSIS, ADHESIONS;  Surgeon: Yuri Washington MD;  [address]  Service: Cardiovascular;;    PLACEMENT OF SWAN ROLANDO CATHETER WITH IMAGING GUIDANCE  11/20/2023    Procedure: INSERTION, CATHETER, SWAN-ROLANDO, WITH IMAGING GUIDANCE;  Surgeon: Sajan Hurley MD;  Location: [facility] CATH LAB;  Service: Cardiology;;    REMOVAL OF TUNNELED CENTRAL VENOUS CATHETER (CVC) N/A 3/1/2024    Procedure: REMOVAL, CATHETER, CENTRAL VENOUS, TUNNELED;  Surgeon: Seble Aguilar MD;  Location: [facility] CATH LAB;  Service: Interventional Nephrology;  Laterality: N/A;    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 12/1/2023    Procedure: REPAIR, ANEURYSM, ARTERY, FEMORAL;  Surgeon: Yuri Washington MD;  [address]  Service: Cardiovascular;  Laterality: Right;  Right Femoral Artery Repair    RIGHT HEART CATHETERIZATION Right 10/10/2023    Procedure: INSERTION, CATHETER, RIGHT HEART;  Surgeon: Bin Gandhi MD;  Location: [facility] CATH LAB;  Service: Cardiology;  Laterality: Right;    RIGHT HEART CATHETERIZATION Right 10/13/2023    Procedure: INSERTION, CATHETER, RIGHT HEART;  Surgeon: Walter Mcintyre MD;  Location: [facility] CATH LAB;  Service: Cardiology;  Laterality: Right;    RIGHT HEART CATHETERIZATION  11/13/2023    RIGHT HEART CATHETERIZATION Right 11/13/2023    Procedure: INSERTION, CATHETER, RIGHT HEART;  Surgeon: Juventino Bermudez Jr., MD;  Location: [facility] CATH LAB;  Service: Cardiology;  Laterality: Right;    RIGHT HEART CATHETERIZATION Right 11/20/2023    Procedure: INSERTION, CATHETER, RIGHT HEART;  Surgeon: Sajan Hurley MD;  Location: [facility] CATH LAB;  Service: Cardiology;  Laterality: Right;    RIGHT HEART CATHETERIZATION Right 1/22/2024    Procedure: INSERTION, CATHETER, RIGHT HEART;  Surgeon: Brayan Ocampo MD;   Location: [facility] CATH LAB;  Service: Cardiology;  Laterality: Right;    STERNAL WOUND CLOSURE N/A 12/4/2023    Procedure: CLOSURE, WOUND, STERNUM;  Surgeon: Yuri Washington MD;  [facility];  Service: Cardiovascular;  Laterality: N/A;    STERNOTOMY N/A 12/1/2023    Procedure: STERNOTOMY, REDO;  Surgeon: Yuri Washington MD;  [facility];  Service: Cardiovascular;  Laterality: N/A;    VALVULOPLASTY, MITRAL VALVE N/A 12/1/2023    Procedure: VALVULOPLASTY, MITRAL VALVE;  Surgeon: Yuri Washington MD;  [facility];  Service: Cardiovascular;  Laterality: N/A;       Review of patient's allergies indicates:  No Known Allergies    Medications:  Continuous Infusions:  Scheduled Meds:   amiodarone  400 mg Oral Daily    cholestyramine  1 packet Oral BID    hydrALAZINE  75 mg Oral Q8H    Lactobacillus rhamnosus GG  1 capsule Oral Daily    mupirocin   Nasal BID    venlafaxine  37.5 mg Oral Daily    warfarin  1 mg Oral Daily     PRN Meds:  Current Facility-Administered Medications:     dextrose 50%, 12.5 g, Intravenous, PRN    dextrose 50%, 25 g, Intravenous, PRN    diphenoxylate-atropine 2.5-0.025 mg, 2 tablet, Oral, QID PRN    glucagon (human recombinant), 1 mg, Intramuscular, PRN    glucose, 16 g, Oral, PRN    glucose, 24 g, Oral, PRN    insulin aspart U-100, 0-5 Units, Subcutaneous, QID (AC + HS) PRN    traZODone, 25 mg, Oral, Nightly PRN    Family History    None       Tobacco Use    Smoking status: Former     Current packs/day: 0.50     Types: Cigarettes    Smokeless tobacco: Not on file   Vaping Use    Vaping status: Never Used   Substance and Sexual Activity    Alcohol use: Yes     Comment: rarely    Drug use: No    Sexual activity: Not Currently     Partners: Female       Review of Systems   Constitutional:  Positive for activity change, appetite change and unexpected weight change.   Respiratory:  Positive for shortness of breath.    Cardiovascular:  Positive for leg swelling.    Gastrointestinal:  Positive for diarrhea.   Neurological:  Positive for weakness.   Psychiatric/Behavioral:  Negative for agitation, behavioral problems and confusion.      Objective:     Vital Signs (Most Recent):  Temp: 97.6 °F (36.4 °C) (07/24/25 0710)  Pulse: 79 (07/24/25 1027)  Resp: 18 (07/24/25 0710)  BP: (!) 82/0 (07/24/25 0710)  SpO2: 99 % (07/24/25 0710) Vital Signs (24h Range):  Temp:  [97.5 °F (36.4 °C)-99 °F (37.2 °C)] 97.6 °F (36.4 °C)  Pulse:  [] 79  Resp:  [16-18] 18  SpO2:  [98 %-100 %] 99 %  BP: ()/(0-77) 82/0     Weight: 74.6 kg (164 lb 7.4 oz)  Body mass index is 21.7 kg/m².       Physical Exam  Vitals and nursing note reviewed.   Constitutional:       General: He is not in acute distress.  Cardiovascular:      Rate and Rhythm: Normal rate.      Comments: LVAD hum  Pulmonary:      Effort: Pulmonary effort is normal.   Abdominal:      Tenderness: There is no abdominal tenderness.   Genitourinary:     Comments: Chronic diarrhea  Musculoskeletal:         General: Normal range of motion.      Cervical back: Normal range of motion.   Neurological:      Mental Status: He is alert and oriented to person, place, and time.   Psychiatric:         Mood and Affect: Mood normal.         Behavior: Behavior normal.         Thought Content: Thought content normal.         Judgment: Judgment normal.            Review of Symptoms      Symptom Assessment (ESAS 0-10 Scale)  Pain:  0  Dyspnea:  0  Anxiety:  0  Nausea:  0  Depression:  0  Anorexia:  0  Fatigue:  0  Insomnia:  0  Restlessness:  0  Agitation:  0     CAM / Delirium:  Negative  Constipation:  Negative  Diarrhea:  Positive      Bowel Management Plan (BMP):  Yes      Pain Assessment:  OME in 24 hours:  0  Location(s):      Performance Status:  70    Living Arrangements:  Lives with spouse    Psychosocial/Cultural:   See Palliative Psychosocial Note: Yes  , lives with wife, has adult children and several grandchildren. Worked a semi  " until heart failure disability,  enjoys time with grandchildren.    **Primary  to Follow**  Palliative Care  Consult: Yes    Spiritual:  F - Mavis and Belief:  Roman Catholic   I - Importance:  Still attends Sikhism services regularly    is the patient's brother-in-law  C - Community:  Not actively involved in community   A - Address in Care:   services offered.            Advance Care Planning            Significant Labs: All pertinent labs within the past 24 hours have been reviewed.  CBC:   Recent Labs   Lab 07/24/25  0530   WBC 3.76*   HGB 10.6*   HCT 32.7*   MCV 90   *     BMP:  No results for input(s): "GLU", "NA", "K", "CL", "CO2", "BUN", "CREATININE", "CALCIUM", "MG" in the last 24 hours.    LFT:  Lab Results   Component Value Date    AST 91 (H) 07/23/2025    ALKPHOS 113 07/23/2025    BILITOT 0.5 07/23/2025     Albumin:   Albumin   Date Value Ref Range Status   07/23/2025 2.1 (L) 3.5 - 5.2 g/dL Final   03/20/2025 2.5 (L) 3.5 - 5.2 g/dL Final     Protein:   Protein Total   Date Value Ref Range Status   07/23/2025 5.6 (L) 6.0 - 8.4 gm/dL Final     Total Protein   Date Value Ref Range Status   03/20/2025 8.0 6.0 - 8.4 g/dL Final     Lactic acid:   Lab Results   Component Value Date    LACTATE 0.8 07/21/2025    LACTATE 1.4 07/11/2025    LACTATE 1.5 07/11/2025       Significant Imaging: I have reviewed all pertinent imaging results/findings within the past 24 hours.    Advance Care Planning  "

## 2025-07-24 NOTE — PLAN OF CARE
Problem: Adult Inpatient Plan of Care  Goal: Plan of Care Review  Outcome: Progressing  Goal: Patient-Specific Goal (Individualized)  Outcome: Progressing  Goal: Absence of Hospital-Acquired Illness or Injury  Outcome: Progressing  Goal: Optimal Comfort and Wellbeing  Outcome: Progressing  Goal: Readiness for Transition of Care  Outcome: Progressing     Problem: Diabetes Comorbidity  Goal: Blood Glucose Level Within Targeted Range  Outcome: Progressing     Problem: Infection  Goal: Absence of Infection Signs and Symptoms  Outcome: Progressing     Problem: Wound  Goal: Optimal Coping  Outcome: Progressing  Goal: Optimal Functional Ability  Outcome: Progressing  Goal: Absence of Infection Signs and Symptoms  Outcome: Progressing  Goal: Improved Oral Intake  Outcome: Progressing  Goal: Optimal Pain Control and Function  Outcome: Progressing  Goal: Skin Health and Integrity  Outcome: Progressing  Goal: Optimal Wound Healing  Outcome: Progressing     Problem: Fall Injury Risk  Goal: Absence of Fall and Fall-Related Injury  Outcome: Progressing     Problem: Skin Injury Risk Increased  Goal: Skin Health and Integrity  Outcome: Progressing     Problem: Ventricular Assist Device  Goal: Optimal Adjustment to Device  Outcome: Progressing  Goal: Absence of Bleeding  Outcome: Progressing  Goal: Absence of Embolism Signs and Symptoms  Outcome: Progressing  Goal: Optimal Blood Flow  Outcome: Progressing  Goal: Absence of Infection Signs and Symptoms  Outcome: Progressing  Goal: Effective Right-Sided Heart Function  Outcome: Progressing     Problem: Coping Ineffective  Goal: Effective Coping  Outcome: Progressing     Problem: Hemodialysis  Goal: Safe, Effective Therapy Delivery  Outcome: Progressing  Goal: Effective Tissue Perfusion  Outcome: Progressing  Goal: Absence of Infection Signs and Symptoms  Outcome: Progressing

## 2025-07-24 NOTE — ASSESSMENT & PLAN NOTE
-Repeated admissions for BLE weakness, diarrhea.  Patient is still having issues with decrease in appetite, with appetite stimulant prescribed at home.  Patient also having continued diarrhea since being admitted for C diff infection 5/9.  Patient placed on probiotics Lomotil to help slow down diarrhea.  -Palliative care consulted for [facility] conversation. Plan to transition patient to home hospice.  -continue Megace outpatient for appetite stimulation  -Continue Questran 4 g p.o. b.i.d. to help with diarrhea.  -Continue  Nepro to increased caloric intake  -See PT/OT note for mobility and functional living recommendations

## 2025-07-24 NOTE — SUBJECTIVE & OBJECTIVE
Interval History: HD completed yesterday with 0.5 L removed. No distress today. Team at bedside discussing plan for discharge home with hospice.     Review of patient's allergies indicates:  No Known Allergies  Current Facility-Administered Medications   Medication Frequency    amiodarone tablet 400 mg Daily    cholestyramine 4 gram packet 4 g BID    dextrose 50% injection 12.5 g PRN    dextrose 50% injection 25 g PRN    diphenoxylate-atropine 2.5-0.025 mg per tablet 2 tablet QID PRN    glucagon (human recombinant) injection 1 mg PRN    glucose chewable tablet 16 g PRN    glucose chewable tablet 24 g PRN    hydrALAZINE tablet 75 mg Q8H    insulin aspart U-100 pen 0-5 Units QID (AC + HS) PRN    Lactobacillus rhamnosus GG capsule 1 capsule Daily    mupirocin 2 % ointment BID    trazodone split tablet 25 mg Nightly PRN    venlafaxine tablet 37.5 mg Daily    warfarin (COUMADIN) tablet 1 mg Daily       Objective:     Vital Signs (Most Recent):  Temp: 97.6 °F (36.4 °C) (07/24/25 0710)  Pulse: 79 (07/24/25 1027)  Resp: 18 (07/24/25 0710)  BP: (!) 82/0 (07/24/25 0710)  SpO2: 99 % (07/24/25 0710) Vital Signs (24h Range):  Temp:  [97.5 °F (36.4 °C)-99 °F (37.2 °C)] 97.6 °F (36.4 °C)  Pulse:  [] 79  Resp:  [16-18] 18  SpO2:  [98 %-100 %] 99 %  BP: ()/(0-77) 82/0     Weight: 74.6 kg (164 lb 7.4 oz) (07/24/25 0415)  Body mass index is 21.7 kg/m².  Body surface area is 1.96 meters squared.    I/O last 3 completed shifts:  In: 480 [P.O.:480]  Out: 100 [Other:100]     Physical Exam  Vitals and nursing note reviewed.   Constitutional:       General: He is not in acute distress.  Cardiovascular:      Rate and Rhythm: Normal rate.      Comments: LVAD hum  Pulmonary:      Effort: Pulmonary effort is normal.   Abdominal:      Tenderness: There is no abdominal tenderness.   Musculoskeletal:         General: Normal range of motion.      Cervical back: Normal range of motion.   Neurological:      Mental Status: He is alert and  oriented to person, place, and time.   Psychiatric:         Mood and Affect: Mood normal.         Behavior: Behavior normal.          Significant Labs:  CBC:   Recent Labs   Lab 07/24/25  0530   WBC 3.76*   RBC 3.64*   HGB 10.6*   HCT 32.7*   *   MCV 90   MCH 29.1   MCHC 32.4     CMP:   Recent Labs   Lab 07/22/25  0339 07/23/25  0413   GLU 72  --    CALCIUM 7.3*  --    ALBUMIN 2.0* 2.1*   PROT 5.3* 5.6*   *  --    K 3.3*  --    CO2 23  --      --    BUN 18  --    CREATININE 3.7*  --    ALKPHOS 114 113   ALT 69* 69*   AST 98* 91*   BILITOT 0.5 0.5     All labs within the past 24 hours have been reviewed.

## 2025-07-25 VITALS
RESPIRATION RATE: 18 BRPM | SYSTOLIC BLOOD PRESSURE: 90 MMHG | HEART RATE: 78 BPM | BODY MASS INDEX: 21.79 KG/M2 | WEIGHT: 164.44 LBS | OXYGEN SATURATION: 99 % | TEMPERATURE: 98 F | HEIGHT: 73 IN

## 2025-07-25 LAB — APTT PPP: 39.1 SECONDS (ref 21–32)

## 2025-07-25 PROCEDURE — 25000003 PHARM REV CODE 250

## 2025-07-25 PROCEDURE — 27000248 HC VAD-ADDITIONAL DAY

## 2025-07-25 PROCEDURE — 99214 OFFICE O/P EST MOD 30 MIN: CPT | Mod: ,,, | Performed by: NURSE PRACTITIONER

## 2025-07-25 PROCEDURE — 80100014 HC HEMODIALYSIS 1:1

## 2025-07-25 PROCEDURE — G0257 UNSCHED DIALYSIS ESRD PT HOS: HCPCS

## 2025-07-25 PROCEDURE — 25000003 PHARM REV CODE 250: Performed by: STUDENT IN AN ORGANIZED HEALTH CARE EDUCATION/TRAINING PROGRAM

## 2025-07-25 PROCEDURE — 36415 COLL VENOUS BLD VENIPUNCTURE: CPT | Performed by: STUDENT IN AN ORGANIZED HEALTH CARE EDUCATION/TRAINING PROGRAM

## 2025-07-25 PROCEDURE — 85730 THROMBOPLASTIN TIME PARTIAL: CPT | Performed by: STUDENT IN AN ORGANIZED HEALTH CARE EDUCATION/TRAINING PROGRAM

## 2025-07-25 PROCEDURE — 94761 N-INVAS EAR/PLS OXIMETRY MLT: CPT

## 2025-07-25 PROCEDURE — G0378 HOSPITAL OBSERVATION PER HR: HCPCS

## 2025-07-25 RX ORDER — MIDODRINE HYDROCHLORIDE 5 MG/1
10 TABLET ORAL ONCE
Status: DISCONTINUED | OUTPATIENT
Start: 2025-07-25 | End: 2025-07-25

## 2025-07-25 RX ADMIN — VENLAFAXINE 37.5 MG: 37.5 TABLET ORAL at 08:07

## 2025-07-25 RX ADMIN — CHOLESTYRAMINE 4 G: 4 POWDER, FOR SUSPENSION ORAL at 07:07

## 2025-07-25 RX ADMIN — MUPIROCIN: 20 OINTMENT TOPICAL at 08:07

## 2025-07-25 RX ADMIN — AMIODARONE HYDROCHLORIDE 400 MG: 200 TABLET ORAL at 07:07

## 2025-07-25 RX ADMIN — Medication 1 CAPSULE: at 08:07

## 2025-07-25 NOTE — NURSING
Spoke with wife at patient's bedside with CRISTAL Barakat, Casey Montes and NP BEL Green; reviewed home hospice and answered wife's questions. I attempted to awaken patient but he would not open his eyes, fell back asleep quickly.     Wife tearful with appropriate responses to conversation. She agrees to Hospice, companies reviewed with her, see CRISTAL note. SW will review with hospices if HD is a possibility. Wife made aware that we cannot promise HD at home with hospice. Wife verbalized understanding. Family is also on the way to visit patient.     After meeting, I sat with wife and spoke with her about transitioning to hospice with a LVAD and transition to death. Reviewed with and without HD. Wife had appropriate questions. I informed that I will come back this afternoon and speak to his family as well.

## 2025-07-25 NOTE — PROGRESS NOTES
07/25/25 0250   Required for all Hemodialysis Patients   Hepatitis Status negative   Handoff Report   Received From Jonathan RN   Given To Cindy CARD   Treatment Type   Treatment Type Maintenance   Vital Signs   Temp 98.8 °F (37.1 °C)   Temp Source Oral   Pulse 91   Heart Rate Source Monitor   Resp 18   Device (Oxygen Therapy) room air   BP (!) 88/0   BP Location Left arm   BP Method Doppler   Patient Position Lying   Assessments (Pre/Post)   Consent Obtained yes   Safety other (see comments)   Date Hepatitis Profile Obtained 03/11/25   Transport Modality not applicable   Level of Consciousness (AVPU) alert   Pain   Preferred Pain Scale number (Numeric Rating Pain Scale)   Pain Rating (0-10): Rest 0   Pre-Hemodialysis Assessment   Patient Status Arrived   Treatment Status Started   Gross Bleach Negative Yes   Machine Number K25   Alarms Verified Yes   pH 7   Machine Temperature 97.7 °F (36.5 °C)   Dialyzer F-180   Machine Conductivity 14   Meter Conductivity 14   Dialysate Na (mEq/L) 138   Dialysate K (mEq/L) 4   Dialysate CA (mEq/L) 2.5   Dialysate HCO3 (mEq/L) 35   Prime Ordered (mL) 500 mL   Net UF Goal 1000   Total UF Goal 1500   Pre-Hemodialysis Comments arrived to pt's bs. report rcvd. POC rev w pt and family member. verbalized understanding.   RO Rejection Within Limits? Yes        Hemodialysis Catheter 06/24/24 1440 right internal jugular   Placement Date/Time: 06/24/24 1440   Present Prior to Hospital Arrival?: No  Hand Hygiene: Performed  Barrier Precautions: Performed  Skin Antisepsis: ChloraPrep  Hemodialysis Catheter Type: Tunneled catheter  Location: right internal jugular  Cathete...   Site Assessment No drainage;No redness;No swelling;No warmth   Line Securement Device Secured with sutures   Dressing Type CHG impregnated dressing/sponge   Dressing Status Clean;Dry;Intact   Dressing Intervention Integrity maintained   Date on Dressing 07/23/25   Dressing Due to be Changed 07/30/25   Venous  Patency/Care accessed;blood return present;flushed w/o difficulty   Arterial Patency/Care accessed;blood return present;flushed w/o difficulty   During Hemodialysis Assessment   Blood Flow Rate (mL/min) 400 mL/min   Dialysate Flow Rate (mL/min) 800 ml/min   Ultrafiltration Rate (mL/Hr) 510 mL/Hr   Arteriovenous Lines Secure Yes   Arterial Pressure (mmHg) -150 mmHg   Venous Pressure (mmHg) 120   TMP 20   Venous Line in Air Detector Yes   Intake (mL) 250 mL   Intra-Hemodialysis Comments MD orders rev and verified. machine settings per MD orders; CVC cleansed and allowed to dry. A and V ports: aspirated 10 cc blood and discarded w ease; flushed w 10 cc NS w/o diff. machine connected, lines secured alarms engaged tx initiated. primary RN aware tx in progress   Edema   Edema generalized

## 2025-07-25 NOTE — PLAN OF CARE
Patient currently receiving HD. Pt free of falls and injury. Pt denies any pain or discomfort. Pt AAOx4. Fall precautions remain in place. LVAD hum present and smooth. VAD numbers and dopplers WDL. DLES dressing CDI. Plan of care reviewed with pt.     Problem: Adult Inpatient Plan of Care  Goal: Plan of Care Review  Outcome: Progressing  Goal: Patient-Specific Goal (Individualized)  Outcome: Progressing     Problem: Diabetes Comorbidity  Goal: Blood Glucose Level Within Targeted Range  Outcome: Progressing     Problem: Wound  Goal: Optimal Coping  Outcome: Progressing  Goal: Optimal Functional Ability  Outcome: Progressing     Problem: Fall Injury Risk  Goal: Absence of Fall and Fall-Related Injury  Outcome: Progressing     Problem: Skin Injury Risk Increased  Goal: Skin Health and Integrity  Outcome: Progressing     Problem: Coping Ineffective  Goal: Effective Coping  Outcome: Progressing

## 2025-07-25 NOTE — PROGRESS NOTES
07/25/2025  Yonathan Osullivan    Current provider:  Juventino Bermudez Jr.*    Device interrogation:      7/24/2025    11:35 PM 7/24/2025     8:55 PM 7/24/2025     4:08 PM 7/24/2025    11:14 AM 7/24/2025     7:30 AM 7/24/2025     4:16 AM 7/23/2025    11:17 PM   TXP LVAD INTERROGATIONS   Type HeartMate3 HeartMate3 HeartMate3 HeartMate3 HeartMate3 HeartMate3 HeartMate3   Flow 3.2 3.5 3.5 3.7 3.2 3.3 3.3   Speed 5000 5000 5000 5000 5000 5000 5000   PI 6.6 5.8 6 5.5 7.1 6.5 6.7   Power ([place]) 3.4 3.3 3.3 3.4 3.4 3.4 3.4   LSL 4600 4600 4600 4600 4600 4600 4600   Low Flow Alarm  no        Pulsatility Intermittent pulse Intermittent pulse   Intermittent pulse Intermittent pulse Intermittent pulse          Rounded on [facility] to ensure all mechanical assist device settings (IABP or VAD) were appropriate and all parameters were within limits.  I was able to ensure all back up equipment was present, the staff had no issues, and the Perfusion Department daily rounding was complete.      For implantable VADs: Interrogation of Ventricular assist device was performed with analysis of device parameters and review of device function. I have personally reviewed the interrogation findings and agree with findings as stated.     In emergency, the nursing units have been notified to contact the perfusion department either by:  Calling [phone] from 630am to 4pm Mon thru Fri, utilizing the On-Call Finder functionality of Epic and searching for Perfusion, or by contacting the hospital  from 4pm to 630am and on weekends and asking to speak with the perfusionist on call.    6:55 AM

## 2025-07-25 NOTE — NURSING
Rounded on patient prior to discharge home with hospice. Wife at bedside. Patient awake and alert, family on the phone. Patient states he is feeling good today after having his HD session. He is happy to be going home and seeing many of his family and friends. Wife says she has plenty of dressing changes at home, will continue weekly.     Patient will be with [facility] in  who was educated on Wednesday as another LVAD patient discharged to them.

## 2025-07-25 NOTE — DISCHARGE SUMMARY
[facility] - Cardiology Stepdown  Heart Transplant  Discharge Summary      Patient Name: Radha Abbott  MRN: [id number]  Admission Date: 7/21/2025  Hospital Length of Stay: 0 days  Discharge Date and Time: 07/25/2025 10:48 AM  Attending Physician: Juventino Bermudez Jr.*   Discharging Provider: Frank Green [facility]  Primary Care Provider: Vasu Kong MD     HPI: No notes on file    * No surgery found *     Hospital Course: Mr. Abbott is a 65-year-old male who was admitted on 07/21 for complaints progressive weakening bilateral lower extremities, with the associated loss of appetite.  Per the patient's wife, patient also has been complaining of patient having SOB with minimal exertion.  The patient also states that he was continuing to have diarrhea, with frequent bowel movements.    Patient had previous hospital stays over the past couple of months starting in early May. 5/9-11: Patient was hospitalized for anemia and C-difficile infection. At that time patient received a unit of blood, hydrated for low flow alarms, hydralazine given to target MAPs to 80s with Hydral 50mg TID, then put on extended vancomycin course with taper for discharge on 5/11. He was admitted again for ongoing diarrhea and low-flow alarms from 05/23 - 05/26/2025 and treated with IV fluids.  His last clinic visit with heart transplant Service with Dr. Ocampo on 06/11/2023.  His C diff report was positive on August 2024, 05/10/2025.  He was negative for C diff infection on 05/23/2025, 07/10/2025.  Patient was admitted for weakness, diarrhea, loss of appetite with low-flow alarms from 7/10-7/14/25 and repeat  C diff testing on 7/12 was negative also.  He was recommended supportive care with probiotics, and Lomotil.     During this hospital admission all home medications were restarted and patient was also placed on Questran 4 g b.i.d. to help to decrease watery BMs and have more formed stools.  PT/OT was also consulted on the case to  evaluate mobility and ADL functional status.  They recommended the patient would benefit from acute skilled OT services he will most likely go home on home health with PT/OT.  Palliative Medicine was also consulted on the case for goals of care conversations.  Patient will transition to home hospice.    Goals of Care Treatment Preferences:  Code Status: Full Code    Health care agent: Pt's wife, Arlyn is NOK  Health care agent number: No value filed.          What is most important right now is to focus on quality of life, even if it means sacrificing a little time, extending life as long as possible, even it it means sacrificing quality, curative/life-prolongation (regardless of treatment burdens), symptom/pain control, comfort and QOL .  Accordingly, we have decided that the best plan to meet the patient's goals includes continuing with treatment, enrolling in hospice care, pivot to comfort-focused care.      Consults (From admission, onward)          Status Ordering Provider     Inpatient consult to Skin Integrity  Practitioner  Once        Provider:  Dayanna Crabtree NP    Acknowledged REILLY DACOSTA     Inpatient consult to Palliative Care  Once        Provider:  (Not yet assigned)    Completed ARMIDA GARDUNO     Inpatient consult to Nephrology  Once        Provider:  (Not yet assigned)    Completed ARMIDA GARDUNO            Significant Diagnostic Studies: Labs: All labs within the past 24 hours have been reviewed    Pending Diagnostic Studies:       None          Final Active Diagnoses:    Diagnosis Date Noted POA    PRINCIPAL PROBLEM:  Failure to thrive in adult [R62.7] 07/22/2025 Yes    Chronic combined systolic and diastolic CHF, NYHA class 4 [I50.42] 06/12/2024 Yes     Chronic    LVAD (left ventricular assist device) present [Z95.811] 12/01/2023 Not Applicable    PAF (paroxysmal atrial fibrillation) [I48.0] 10/11/2023 Yes    ESRD (end stage renal disease) [N18.6] 01/22/2024 Yes    Advanced care  "planning/counseling discussion [Z71.89] 07/22/2025 Not Applicable    Goals of care, counseling/discussion [Z71.89] 05/30/2024 Not Applicable    Moderate protein-calorie malnutrition [E44.0] 12/14/2023 Yes    HFrEF (heart failure with reduced ejection fraction) [I50.20] 12/09/2023 Yes    Palliative care encounter [Z51.5] 11/17/2023 Not Applicable    Type 2 diabetes mellitus without complication, without long-term current use of insulin [E11.9] 10/07/2023 Yes      Problems Resolved During this Admission:      Discharged Condition: stable    Disposition: Hospice/Home    Follow Up:    Patient Instructions:      HOSPITAL BED FOR HOME USE     Order Specific Question Answer Comments   Type: Semi-electric    Length of need (1-99 months): 99    Does patient have medical equipment at home? bath bench    Does patient have medical equipment at home? grab bar    Does patient have medical equipment at home? bedside commode    Does patient have medical equipment at home? walker, rolling    Does patient have medical equipment at home? rollator    Height: 6' 1" (1.854 m)    Weight: 77.9 kg (171 lb 11.8 oz)    Please check all that apply: Patient requires the head of bed to be elevated more than 30 degrees most of the time due to congestive heart failure, chronic pulmonary disease, or aspiration.  Pillows and wedges have been considered and ruled out.      PATIENT (NOELLE) LIFT FOR HOME USE     Order Specific Question Answer Comments   Height: 6' 1" (1.854 m)    Weight: 77.9 kg (171 lb 11.8 oz)    Does patient have medical equipment at home? bath bench    Does patient have medical equipment at home? grab bar    Does patient have medical equipment at home? bedside commode    Does patient have medical equipment at home? walker, rolling    Length of need (1-99 months): 3      Diet Adult Regular     Activity as tolerated     Medications:  Reconciled Home Medications:      Medication List        START taking these medications  "     cholestyramine 4 gram packet  Commonly known as: QUESTRAN  Take 1 packet (4 g total) by mouth 2 (two) times daily. Take second dose at least 3 hours apart from warfarin            CHANGE how you take these medications      warfarin 1 MG tablet  Commonly known as: COUMADIN  Take 1 tablet (1 mg total) by mouth Daily.  What changed: See the new instructions.            CONTINUE taking these medications      amiodarone 400 MG tablet  Commonly known as: PACERONE  Take 1 tablet (400 mg total) by mouth once daily.     diphenoxylate-atropine 2.5-0.025 mg 2.5-0.025 mg per tablet  Commonly known as: LOMOTIL  Take 2 tablets by mouth 4 (four) times daily as needed for Diarrhea.     hydrALAZINE 25 MG tablet  Commonly known as: APRESOLINE  Take 3 tablets (75 mg total) by mouth every 8 (eight) hours.     Lactobacillus rhamnosus GG 10 billion cell capsule  Commonly known as: CULTURELLE  Take 1 capsule by mouth once daily.     miscellaneous medical supply Kit  by Apply Externally route 2 (two) times a day. Use twice daily at 8 AM and 3 PM and record the value in CureVact as directed.     potassium, sodium phosphates 280-160-250 mg Pwpk  Commonly known as: PHOS-NAK  Take 1 packet by mouth 4 (four) times daily before meals and nightly.     traZODone 50 MG tablet  Commonly known as: DESYREL  Take 0.5 tablets (25 mg total) by mouth nightly as needed for Insomnia.     venlafaxine 37.5 MG Tab  Commonly known as: EFFEXOR  Take 1 tablet (37.5 mg total) by mouth once daily.     vitamin D 1000 units Tab  Commonly known as: VITAMIN D3  Take 1 tablet by mouth once daily              JUAN Dasilva-BC  Heart Transplant  [facility] - Cardiology Stepdown

## 2025-07-25 NOTE — NURSING
Rounded back on patient, sister and wife at bedside. I was able to speak with patient's sister and answer her questions about home hospice, LVAD and HD. Sister was appreciative of the answers and agrees with the plan for home hospice with or without HD.     Patient woke up briefly while I was there and stated that he is ready to go home. He wants some gumbo for dinner, a friend is bringing this for him.

## 2025-07-25 NOTE — PT/OT/SLP PROGRESS
Occupational Therapy      Patient Name:  Radha Abbott   MRN:  [id number]    Patient not seen today secondary pt anticipating discharge with home hospice this date, wife expresses no concerns re: OT support at this time.  Thank you for your consult.   7/25/2025

## 2025-07-25 NOTE — SUBJECTIVE & OBJECTIVE
Interval History:  No acute events noted overnight.  Patient denies CP, SOB, dizziness, lightheadedness, palpitations.  Per the wife, patient did have 3 bowel movements that were loose, but patient did drink Questran to help with loose stools.  Patient received HD treatment this morning at approximately 0300 and was on treatment for 3 hours, UF discontinued due to Doppler pulse of 60/0.  Wife states equipment was delivered to the house yesterday.  Since HD completed, we will arrange for transportation this morning to bring the patient home.    Continuous Infusions:  Scheduled Meds:   amiodarone  400 mg Oral Daily    cholestyramine  1 packet Oral BID    hydrALAZINE  75 mg Oral Q8H    Lactobacillus rhamnosus GG  1 capsule Oral Daily    mupirocin   Nasal BID    venlafaxine  37.5 mg Oral Daily    warfarin  1 mg Oral Daily     PRN Meds:  Current Facility-Administered Medications:     dextrose 50%, 12.5 g, Intravenous, PRN    dextrose 50%, 25 g, Intravenous, PRN    diphenoxylate-atropine 2.5-0.025 mg, 2 tablet, Oral, QID PRN    glucagon (human recombinant), 1 mg, Intramuscular, PRN    glucose, 16 g, Oral, PRN    glucose, 24 g, Oral, PRN    insulin aspart U-100, 0-5 Units, Subcutaneous, QID (AC + HS) PRN    traZODone, 25 mg, Oral, Nightly PRN    Review of patient's allergies indicates:  No Known Allergies  Objective:     Vital Signs (Most Recent):  Temp: 98.2 °F (36.8 °C) (07/25/25 0730)  Pulse: 78 (07/25/25 0742)  Resp: 18 (07/25/25 0730)  BP: (!) 90/0 (07/25/25 0730)  SpO2: 99 % (07/25/25 0745) Vital Signs (24h Range):  Temp:  [97.4 °F (36.3 °C)-98.8 °F (37.1 °C)] 98.2 °F (36.8 °C)  Pulse:  [] 78  Resp:  [16-18] 18  SpO2:  [92 %-99 %] 99 %  BP: (60-90)/(0) 90/0     Patient Vitals for the past 72 hrs (Last 3 readings):   Weight   07/24/25 0415 74.6 kg (164 lb 7.4 oz)   07/23/25 0500 77.9 kg (171 lb 11.8 oz)     Body mass index is 21.7 kg/m².      Intake/Output Summary (Last 24 hours) at 7/25/2025 0806  Last data  "filed at 7/25/2025 0550  Gross per 24 hour   Intake 100 ml   Output 987 ml   Net -887 ml       Hemodynamic Parameters:        Physical Exam  HENT:      Mouth/Throat:      Mouth: Mucous membranes are moist.   Eyes:      Pupils: Pupils are equal, round, and reactive to light.   Cardiovascular:      Rate and Rhythm: Normal rate.      Heart sounds: Heart sounds not distant. No murmur heard.     No friction rub. No gallop.   Abdominal:      General: There is no distension.      Palpations: Abdomen is soft.      Tenderness: There is no abdominal tenderness. There is no guarding.   Musculoskeletal:      Right lower leg: No edema.      Left lower leg: No edema.   Skin:     General: Skin is warm and dry.      Capillary Refill: Capillary refill takes less than 2 seconds.   Neurological:      Mental Status: He is alert. Mental status is at baseline.            Significant Labs:  CBC:  Recent Labs   Lab 07/22/25 0339 07/23/25 0413 07/24/25  0530   WBC 4.45 4.31 3.76*   RBC 3.36* 3.65* 3.64*   HGB 9.7* 10.4* 10.6*   HCT 30.5* 33.0* 32.7*   * 143* 118*   MCV 91 90 90   MCH 28.9 28.5 29.1   MCHC 31.8* 31.5* 32.4     BNP:  Recent Labs   Lab 07/23/25 0413   BNP 1,415*     CMP:  Recent Labs   Lab 07/21/25  1547 07/22/25 0339 07/23/25 0413   GLU 81 72  --    CALCIUM 7.7* 7.3*  --    ALBUMIN 2.2* 2.0* 2.1*   PROT 6.1 5.3* 5.6*   * 135*  --    K 3.2* 3.3*  --    CO2 26 23  --     105  --    BUN 12 18  --    CREATININE 3.2* 3.7*  --    ALKPHOS 119 114 113   ALT 80* 69* 69*   * 98* 91*   BILITOT 0.6 0.5 0.5      Coagulation:   Recent Labs   Lab 07/22/25 0339 07/23/25 0413 07/24/25  0530   INR 2.0* 1.9* 1.7*   APTT 41.7* 36.7* 31.8     LDH:  No results for input(s): "LDH" in the last 72 hours.  Microbiology:  Microbiology Results (last 7 days)       Procedure Component Value Units Date/Time    Blood culture [[id number]]  (Normal) Collected: 07/22/25 1049    Order Status: Completed Specimen: Blood from " Peripheral, Hand, Left Updated: 07/24/25 1300     Blood Culture No Growth After 48 Hours    Blood culture [[id number]]  (Normal) Collected: 07/22/25 1049    Order Status: Completed Specimen: Blood from Peripheral, Lower Arm, Left Updated: 07/24/25 0102     Blood Culture No Growth After 36 Hours            I have reviewed all pertinent labs within the past 24 hours.    Estimated Creatinine Clearance: 21.6 mL/min (A) (based on SCr of 3.7 mg/dL (H)).    Diagnostic Results:  I have reviewed all pertinent imaging results/findings within the past 24 hours.

## 2025-07-25 NOTE — PROGRESS NOTES
07/25/25 0550   Vital Signs   Temp 97.8 °F (36.6 °C)   Temp Source Oral   Pulse 88   Heart Rate Source Monitor   Resp 18   Device (Oxygen Therapy) room air   BP (!) 82/0   BP Location Left arm   BP Method Doppler   Patient Position Lying        Hemodialysis Catheter 06/24/24 1440 right internal jugular   Placement Date/Time: 06/24/24 1440   Present Prior to Hospital Arrival?: No  Hand Hygiene: Performed  Barrier Precautions: Performed  Skin Antisepsis: ChloraPrep  Hemodialysis Catheter Type: Tunneled catheter  Location: right internal jugular  Cathete...   Line Necessity Review CRRT/HD   Site Assessment No drainage;No redness;No swelling;No warmth   Line Securement Device Secured with sutures   Dressing Type CHG impregnated dressing/sponge   Dressing Status Clean;Dry;Intact   Dressing Intervention Integrity maintained   Date on Dressing 07/22/25   Dressing Due to be Changed 07/29/25   Venous Patency/Care deaccessed;flushed w/o difficulty   Arterial Patency/Care deaccessed;flushed w/o difficulty   During Hemodialysis Assessment   UF Removed (mL) 987 mL   Intake (mL) 250 mL   Intra-Hemodialysis Comments tx completed; blood rinsedback   Post-Hemodialysis Assessment   Rinseback Volume (mL) 250 mL   Blood Volume Processed (Liters) 64.6 L   Dialyzer Clearance Lightly streaked   Duration of Treatment 210 minutes   Total UF (mL) 987 mL   Net Fluid Removal 487   Patient Response to Treatment pt bienvenido 3 hr HD tx with 497 cc fluid removal due to decrease asymptomatic doppler pressures.   Post-Hemodialysis Comments CVC flusehd w 10 cc NS, capped with white end sj and gauze wrapped. report to primary RN ; no distress noted upon departure

## 2025-07-25 NOTE — PROGRESS NOTES
Discharge    Met with pt's spouse Arlyn for discharge. Pt asleep with no signs of discomfort or distress. Spouse in agreement with pt discharging to home today with [facility]. Pt will transport via ambulance due to postural instability. Spouse coping appropriately and denies needs, questions, concerns at this time. Providing psychosocial and counseling support, education, resources, assistance and discharge planning as indicated. SW remains available.    Kimberly Johnson [phone].    Ambulance requested via [facility] for 11am .

## 2025-07-25 NOTE — PLAN OF CARE
Patient is ready for discharge to [facility]. Patient stable alert and oriented. PIVs removed, however HD catheter left in place for HD at facility. VAD equipment reconciled.  No complaints of pain. Discussed discharge plan. Reviewed medications and side effects, appointments, and answered questions with patient. Pending ambulance transport.

## 2025-07-25 NOTE — ASSESSMENT & PLAN NOTE
-On HD (M, W, F).  Kurt cath, right subclavian artery.  HD performed this morning, see dialysis RN note for detail  -Nephrology consulted for renal function management

## 2025-07-25 NOTE — ASSESSMENT & PLAN NOTE
65 y.o. Black or  Male ESRD-HD M-W-F presents to ED on 7/21/2025 with diagnosis of: Generalized weakness [R53.1];Failure to thrive in adult [R62.7]   Nephrology consulted for inpatient ESRD-HD management    Outpatient HD Information:  -Dialysis modality: Hemodialysis  -Outpatient HD unit: [facility]  -Nephrologist: ?  -HD TX days: Monday/Wednesday/Friday, duration of treatment: 3 hrs  -Last HD TX prior to hospital admission: 7/24/2025  -Dialysis access: dialysis catheter   -Residual urine: Minium  -EDW: 82.1 kg     Assessment:   - HD completed today. Plans for dc with home hospice with no further HD plans once discharged.   - Continue to monitor intake and output  - Please avoid gadolinium, fleets, phos-based laxatives, NSAIDs  - Dialysis thrice weekly unless more urgent indications arise. Will evaluate RRT requirements Daily.  - Increased mortality risk secondary to ESRD on dialysis and HFrEF stage D s/p HM 3  in setting of repeated admissions for BLE weakness, diarrhea, and decrease appetite.   - OP records reviewed; inpatient dialysis orders to be adjusted as needed per OP records.       Anemia of ESRD   Recent Labs   Lab 07/22/25  0339 07/23/25  0413 07/24/25  0530   WBC 4.45 4.31 3.76*   HGB 9.7* 10.4* 10.6*   HCT 30.5* 33.0* 32.7*   * 143* 118*       Lab Results   Component Value Date    FESATURATED 6 (L) 06/12/2024    FERRITIN 4,766.0 (H) 05/24/2025       - Goal in ESRD is Hgb of 10-11. Hgb 10.6. @ target.   - EPO can be administered and dosed per his OP unit upon discharge.  - if patient is on iron infusions please D/C when active infection, cautiously use EPO when hx of malignancy, high BP (SBP usually > 170mmhg).    Mineral Bone Disease in ESRD   Lab Results   Component Value Date    .7 (H) 06/18/2024    CALCIUM 7.3 (L) 07/22/2025    ALBUMIN 2.1 (L) 07/23/2025    CAION 1.16 01/23/2024    PHOS 1.3 (L) 07/22/2025       - F/U PO4, Mg, Calcium. And albumin levels daily.   - Please  liberalized diet and protein intake goal 1.5 g/kg/d with 1 L fluid restriction   - If patient has poor oral intake, recommend nephro nutritional shakes (ex Novasource)  - Phos low. HOLD binders.

## 2025-07-25 NOTE — PROGRESS NOTES
[facility] - [facility] Step[facility]  Nephrology  Progress Note    Patient Name: Radha Abbott  MRN: [id number]  Admission Date: 7/21/2025  Hospital Length of Stay: 0 days  Attending Provider: Juventino Bermudez Jr.*   Primary Care Physician: Vasu Kong MD  Principal Problem:Failure to thrive in adult    Subjective:     HPI: The patient is a 65 y.o. Black or  Male with multiple co morbidities including stage D HFrEF 2/2 ICM s/p HM3 as DT (12/1/2023), ESRD on HD (MWF), and debility who presents to ED on 7/21/2025 with complaints of bilateral leg weakness. Of note, the patient has been hospitalized multiple times w similar complaints including C -difficile infection and low-flow alarms. Wife reports progressive LE weakness x 1 week and decreased appetite. Denies complaints of shortness of breath and CP. The patient is MWF dialysis patient and was last dialyzed yesterday. Nephrology consulted for management of ESRD and HD treatment.    Interval History:   HD completed this AM with 487 mL removed. Resting comfortably on exam. Plans for dc home with hospice today. No further HD plans once dc'd per GOC discussions yesterday.     Review of patient's allergies indicates:  No Known Allergies  Current Facility-Administered Medications   Medication Frequency    amiodarone tablet 400 mg Daily    cholestyramine 4 gram packet 4 g BID    dextrose 50% injection 12.5 g PRN    dextrose 50% injection 25 g PRN    diphenoxylate-atropine 2.5-0.025 mg per tablet 2 tablet QID PRN    glucagon (human recombinant) injection 1 mg PRN    glucose chewable tablet 16 g PRN    glucose chewable tablet 24 g PRN    hydrALAZINE tablet 75 mg Q8H    insulin aspart U-100 pen 0-5 Units QID (AC + HS) PRN    Lactobacillus rhamnosus GG capsule 1 capsule Daily    mupirocin 2 % ointment BID    trazodone split tablet 25 mg Nightly PRN    venlafaxine tablet 37.5 mg Daily    warfarin (COUMADIN) tablet 1 mg Daily       Objective:     Vital Signs  (Most Recent):  Temp: 98.2 °F (36.8 °C) (07/25/25 0730)  Pulse: 78 (07/25/25 0742)  Resp: 18 (07/25/25 0730)  BP: (!) 90/0 (07/25/25 0730)  SpO2: 99 % (07/25/25 0745) Vital Signs (24h Range):  Temp:  [97.4 °F (36.3 °C)-98.8 °F (37.1 °C)] 98.2 °F (36.8 °C)  Pulse:  [] 78  Resp:  [16-18] 18  SpO2:  [92 %-99 %] 99 %  BP: (60-90)/(0) 90/0     Weight: 74.6 kg (164 lb 7.4 oz) (07/24/25 0415)  Body mass index is 21.7 kg/m².  Body surface area is 1.96 meters squared.    I/O last 3 completed shifts:  In: 220 [P.O.:220]  Out: 987 [Other:987]     Physical Exam  Vitals and nursing note reviewed.   Constitutional:       General: He is not in acute distress.  Cardiovascular:      Rate and Rhythm: Normal rate.      Comments: LVAD hum  Pulmonary:      Effort: Pulmonary effort is normal.   Abdominal:      Tenderness: There is no abdominal tenderness.   Musculoskeletal:         General: Normal range of motion.      Cervical back: Normal range of motion.   Neurological:      Mental Status: He is alert and oriented to person, place, and time.   Psychiatric:         Mood and Affect: Mood normal.         Behavior: Behavior normal.          Significant Labs:  CBC:   Recent Labs   Lab 07/24/25  0530   WBC 3.76*   RBC 3.64*   HGB 10.6*   HCT 32.7*   *   MCV 90   MCH 29.1   MCHC 32.4     CMP:   Recent Labs   Lab 07/22/25  0339 07/23/25  0413   GLU 72  --    CALCIUM 7.3*  --    ALBUMIN 2.0* 2.1*   PROT 5.3* 5.6*   *  --    K 3.3*  --    CO2 23  --      --    BUN 18  --    CREATININE 3.7*  --    ALKPHOS 114 113   ALT 69* 69*   AST 98* 91*   BILITOT 0.5 0.5     All labs within the past 24 hours have been reviewed.     Assessment/Plan:     Renal/  ESRD (end stage renal disease)  65 y.o. Black or  Male ESRD-HD M-W-F presents to ED on 7/21/2025 with diagnosis of: Generalized weakness [R53.1];Failure to thrive in adult [R62.7]   Nephrology consulted for inpatient ESRD-HD management    Outpatient HD  Information:  -Dialysis modality: Hemodialysis  -Outpatient HD unit: [facility]  -Nephrologist: ?  -HD TX days: Monday/Wednesday/Friday, duration of treatment: 3 hrs  -Last HD TX prior to hospital admission: 7/24/2025  -Dialysis access: dialysis catheter   -Residual urine: Minium  -EDW: 82.1 kg     Assessment:   - HD completed today. Plans for dc with home hospice with no further HD plans once discharged.   - Continue to monitor intake and output  - Please avoid gadolinium, fleets, phos-based laxatives, NSAIDs  - Dialysis thrice weekly unless more urgent indications arise. Will evaluate RRT requirements Daily.  - Increased mortality risk secondary to ESRD on dialysis and HFrEF stage D s/p HM 3  in setting of repeated admissions for BLE weakness, diarrhea, and decrease appetite.   - OP records reviewed; inpatient dialysis orders to be adjusted as needed per OP records.       Anemia of ESRD   Recent Labs   Lab 07/22/25  0339 07/23/25  0413 07/24/25  0530   WBC 4.45 4.31 3.76*   HGB 9.7* 10.4* 10.6*   HCT 30.5* 33.0* 32.7*   * 143* 118*       Lab Results   Component Value Date    FESATURATED 6 (L) 06/12/2024    FERRITIN 4,766.0 (H) 05/24/2025       - Goal in ESRD is Hgb of 10-11. Hgb 10.6. @ target.   - EPO can be administered and dosed per his OP unit upon discharge.  - if patient is on iron infusions please D/C when active infection, cautiously use EPO when hx of malignancy, high BP (SBP usually > 170mmhg).    Mineral Bone Disease in ESRD   Lab Results   Component Value Date    .7 (H) 06/18/2024    CALCIUM 7.3 (L) 07/22/2025    ALBUMIN 2.1 (L) 07/23/2025    CAION 1.16 01/23/2024    PHOS 1.3 (L) 07/22/2025       - F/U PO4, Mg, Calcium. And albumin levels daily.   - Please liberalized diet and protein intake goal 1.5 g/kg/d with 1 L fluid restriction   - If patient has poor oral intake, recommend nephro nutritional shakes (ex Novasource)  - Phos low. HOLD binders.     Endocrine  * Failure to thrive in  adult  - defer to primary team         Thank you for your consult. I will follow-up with patient. Please contact us if you have any additional questions.    Matilda Hinton DNP, FNP-C  Nephrology  [facility] - Cardiology Stepdown

## 2025-07-25 NOTE — SUBJECTIVE & OBJECTIVE
Interval History:   HD completed this AM with 487 mL removed. Resting comfortably on exam. Plans for dc home with hospice today. No further HD plans once dc'd per GOC discussions yesterday.     Review of patient's allergies indicates:  No Known Allergies  Current Facility-Administered Medications   Medication Frequency    amiodarone tablet 400 mg Daily    cholestyramine 4 gram packet 4 g BID    dextrose 50% injection 12.5 g PRN    dextrose 50% injection 25 g PRN    diphenoxylate-atropine 2.5-0.025 mg per tablet 2 tablet QID PRN    glucagon (human recombinant) injection 1 mg PRN    glucose chewable tablet 16 g PRN    glucose chewable tablet 24 g PRN    hydrALAZINE tablet 75 mg Q8H    insulin aspart U-100 pen 0-5 Units QID (AC + HS) PRN    Lactobacillus rhamnosus GG capsule 1 capsule Daily    mupirocin 2 % ointment BID    trazodone split tablet 25 mg Nightly PRN    venlafaxine tablet 37.5 mg Daily    warfarin (COUMADIN) tablet 1 mg Daily       Objective:     Vital Signs (Most Recent):  Temp: 98.2 °F (36.8 °C) (07/25/25 0730)  Pulse: 78 (07/25/25 0742)  Resp: 18 (07/25/25 0730)  BP: (!) 90/0 (07/25/25 0730)  SpO2: 99 % (07/25/25 0745) Vital Signs (24h Range):  Temp:  [97.4 °F (36.3 °C)-98.8 °F (37.1 °C)] 98.2 °F (36.8 °C)  Pulse:  [] 78  Resp:  [16-18] 18  SpO2:  [92 %-99 %] 99 %  BP: (60-90)/(0) 90/0     Weight: 74.6 kg (164 lb 7.4 oz) (07/24/25 0415)  Body mass index is 21.7 kg/m².  Body surface area is 1.96 meters squared.    I/O last 3 completed shifts:  In: 220 [P.O.:220]  Out: 987 [Other:987]     Physical Exam  Vitals and nursing note reviewed.   Constitutional:       General: He is not in acute distress.  Cardiovascular:      Rate and Rhythm: Normal rate.      Comments: LVAD hum  Pulmonary:      Effort: Pulmonary effort is normal.   Abdominal:      Tenderness: There is no abdominal tenderness.   Musculoskeletal:         General: Normal range of motion.      Cervical back: Normal range of motion.    Neurological:      Mental Status: He is alert and oriented to person, place, and time.   Psychiatric:         Mood and Affect: Mood normal.         Behavior: Behavior normal.          Significant Labs:  CBC:   Recent Labs   Lab 07/24/25  0530   WBC 3.76*   RBC 3.64*   HGB 10.6*   HCT 32.7*   *   MCV 90   MCH 29.1   MCHC 32.4     CMP:   Recent Labs   Lab 07/22/25  0339 07/23/25  0413   GLU 72  --    CALCIUM 7.3*  --    ALBUMIN 2.0* 2.1*   PROT 5.3* 5.6*   *  --    K 3.3*  --    CO2 23  --      --    BUN 18  --    CREATININE 3.7*  --    ALKPHOS 114 113   ALT 69* 69*   AST 98* 91*   BILITOT 0.5 0.5     All labs within the past 24 hours have been reviewed.

## 2025-07-25 NOTE — PROGRESS NOTES
Patient doppler at 60/0 while receiving HD patient asymptomatic. MD Pandey notified 10mg midodrine placed. Patient doppler at 0510 prior to nurse administering midodrine, 66/0. HD nurse stated she stopped UF after doppler was 60/0. MD Pandey notified and instructed nurse to not give midodrine or scheduled hydralazine this AM. VSS with call light and belongings within reach.

## 2025-07-25 NOTE — ASSESSMENT & PLAN NOTE
Procedure: Device Interrogation Including analysis of device parameters  Current Settings: Ventricular Assist Device  Review of device function is stable        7/25/2025     8:03 AM 7/24/2025    11:35 PM 7/24/2025     8:55 PM 7/24/2025     4:08 PM 7/24/2025    11:14 AM 7/24/2025     7:30 AM 7/24/2025     4:16 AM   TXP LVAD INTERROGATIONS   Type HeartMate3 HeartMate3 HeartMate3 HeartMate3 HeartMate3 HeartMate3 HeartMate3   Flow 3.5 3.2 3.5 3.5 3.7 3.2 3.3   Speed 5000 5000 5000 5000 5000 5000 5000   PI 6.4 6.6 5.8 6 5.5 7.1 6.5   Power ([place]) 3.4 3.4 3.3 3.3 3.4 3.4 3.4   LSL 4600 4600 4600 4600 4600 4600 4600   Low Flow Alarm   no       Pulsatility Intermittent pulse Intermittent pulse Intermittent pulse   Intermittent pulse Intermittent pulse

## 2025-07-25 NOTE — PROGRESS NOTES
[facility] - Cardiology Stepdown  Heart Transplant  Progress Note    Patient Name: Radha Abbott  MRN: [id number]  Admission Date: 7/21/2025  Hospital Length of Stay: 0 days  Attending Physician: Juventino Bermudez Jr.*  Primary Care Provider: Vasu Kong MD  Principal Problem:Failure to thrive in adult    Subjective:   Interval History:  No acute events noted overnight.  Patient denies CP, SOB, dizziness, lightheadedness, palpitations.  Per the wife, patient did have 3 bowel movements that were loose, but patient did drink Questran to help with loose stools.  Patient received HD treatment this morning at approximately 0300 and was on treatment for 3 hours, UF discontinued due to Doppler pulse of 60/0.  Wife states equipment was delivered to the house yesterday.  Since HD completed, we will arrange for transportation this morning to bring the patient home.    Continuous Infusions:  Scheduled Meds:   amiodarone  400 mg Oral Daily    cholestyramine  1 packet Oral BID    hydrALAZINE  75 mg Oral Q8H    Lactobacillus rhamnosus GG  1 capsule Oral Daily    mupirocin   Nasal BID    venlafaxine  37.5 mg Oral Daily    warfarin  1 mg Oral Daily     PRN Meds:  Current Facility-Administered Medications:     dextrose 50%, 12.5 g, Intravenous, PRN    dextrose 50%, 25 g, Intravenous, PRN    diphenoxylate-atropine 2.5-0.025 mg, 2 tablet, Oral, QID PRN    glucagon (human recombinant), 1 mg, Intramuscular, PRN    glucose, 16 g, Oral, PRN    glucose, 24 g, Oral, PRN    insulin aspart U-100, 0-5 Units, Subcutaneous, QID (AC + HS) PRN    traZODone, 25 mg, Oral, Nightly PRN    Review of patient's allergies indicates:  No Known Allergies  Objective:     Vital Signs (Most Recent):  Temp: 98.2 °F (36.8 °C) (07/25/25 0730)  Pulse: 78 (07/25/25 0742)  Resp: 18 (07/25/25 0730)  BP: (!) 90/0 (07/25/25 0730)  SpO2: 99 % (07/25/25 0745) Vital Signs (24h Range):  Temp:  [97.4 °F (36.3 °C)-98.8 °F (37.1 °C)] 98.2 °F (36.8 °C)  Pulse:  []  78  Resp:  [16-18] 18  SpO2:  [92 %-99 %] 99 %  BP: (60-90)/(0) 90/0     Patient Vitals for the past 72 hrs (Last 3 readings):   Weight   07/24/25 0415 74.6 kg (164 lb 7.4 oz)   07/23/25 0500 77.9 kg (171 lb 11.8 oz)     Body mass index is 21.7 kg/m².      Intake/Output Summary (Last 24 hours) at 7/25/2025 0806  Last data filed at 7/25/2025 0550  Gross per 24 hour   Intake 100 ml   Output 987 ml   Net -887 ml       Hemodynamic Parameters:        Physical Exam  HENT:      Mouth/Throat:      Mouth: Mucous membranes are moist.   Eyes:      Pupils: Pupils are equal, round, and reactive to light.   Cardiovascular:      Rate and Rhythm: Normal rate.      Heart sounds: Heart sounds not distant. No murmur heard.     No friction rub. No gallop.   Abdominal:      General: There is no distension.      Palpations: Abdomen is soft.      Tenderness: There is no abdominal tenderness. There is no guarding.   Musculoskeletal:      Right lower leg: No edema.      Left lower leg: No edema.   Skin:     General: Skin is warm and dry.      Capillary Refill: Capillary refill takes less than 2 seconds.   Neurological:      Mental Status: He is alert. Mental status is at baseline.            Significant Labs:  CBC:  Recent Labs   Lab 07/22/25  0339 07/23/25  0413 07/24/25  0530   WBC 4.45 4.31 3.76*   RBC 3.36* 3.65* 3.64*   HGB 9.7* 10.4* 10.6*   HCT 30.5* 33.0* 32.7*   * 143* 118*   MCV 91 90 90   MCH 28.9 28.5 29.1   MCHC 31.8* 31.5* 32.4     BNP:  Recent Labs   Lab 07/23/25 0413   BNP 1,415*     CMP:  Recent Labs   Lab 07/21/25  1547 07/22/25  0339 07/23/25 0413   GLU 81 72  --    CALCIUM 7.7* 7.3*  --    ALBUMIN 2.2* 2.0* 2.1*   PROT 6.1 5.3* 5.6*   * 135*  --    K 3.2* 3.3*  --    CO2 26 23  --     105  --    BUN 12 18  --    CREATININE 3.2* 3.7*  --    ALKPHOS 119 114 113   ALT 80* 69* 69*   * 98* 91*   BILITOT 0.6 0.5 0.5      Coagulation:   Recent Labs   Lab 07/22/25  0339 07/23/25  0413 07/24/25  0556  "  INR 2.0* 1.9* 1.7*   APTT 41.7* 36.7* 31.8     LDH:  No results for input(s): "LDH" in the last 72 hours.  Microbiology:  Microbiology Results (last 7 days)       Procedure Component Value Units Date/Time    Blood culture [[id number]]  (Normal) Collected: 07/22/25 1049    Order Status: Completed Specimen: Blood from Peripheral, Hand, Left Updated: 07/24/25 1300     Blood Culture No Growth After 48 Hours    Blood culture [[id number]]  (Normal) Collected: 07/22/25 1049    Order Status: Completed Specimen: Blood from Peripheral, Lower Arm, Left Updated: 07/24/25 0102     Blood Culture No Growth After 36 Hours            I have reviewed all pertinent labs within the past 24 hours.    Estimated Creatinine Clearance: 21.6 mL/min (A) (based on SCr of 3.7 mg/dL (H)).    Diagnostic Results:  I have reviewed all pertinent imaging results/findings within the past 24 hours.  Assessment and Plan:     No notes on file    * Failure to thrive in adult  -Repeated admissions for BLE weakness, diarrhea.  Patient is still having issues with decrease in appetite, with appetite stimulant prescribed at home.  Patient also having continued diarrhea since being admitted for C diff infection 5/9.  Patient placed on probiotics Lomotil to help slow down diarrhea.  -Palliative care consulted for [facility] conversation. Plan to transition patient to home hospice.  -continue Megace outpatient for appetite stimulation  -Continue Questran 4 g p.o. b.i.d. to help with diarrhea.  -Continue  Nepro to increased caloric intake  -See PT/OT note for mobility and functional living recommendations    Chronic combined systolic and diastolic CHF, NYHA class 4  -HFrEF stage D s/p  3 (01/2023). TTE 04/2025 reveals LV EF 10-15%, LVAD 5000 rpm, LVIDd 5.5 cm, trace TR  -Continue hydralazine 75 mg every 8 hours  -Diuresis:  Fluid management per Nephrology.  Patient on HD (m,W,F). HD performed yesterday  -Continue to monitor fluid status, strict I's and O, daily " weight    LVAD (left ventricular assist device) present  Procedure: Device Interrogation Including analysis of device parameters  Current Settings: Ventricular Assist Device  Review of device function is stable        7/25/2025     8:03 AM 7/24/2025    11:35 PM 7/24/2025     8:55 PM 7/24/2025     4:08 PM 7/24/2025    11:14 AM 7/24/2025     7:30 AM 7/24/2025     4:16 AM   TXP LVAD INTERROGATIONS   Type HeartMate3 HeartMate3 HeartMate3 HeartMate3 HeartMate3 HeartMate3 HeartMate3   Flow 3.5 3.2 3.5 3.5 3.7 3.2 3.3   Speed 5000 5000 5000 5000 5000 5000 5000   PI 6.4 6.6 5.8 6 5.5 7.1 6.5   Power ([place]) 3.4 3.4 3.3 3.3 3.4 3.4 3.4   LSL 4600 4600 4600 4600 4600 4600 4600   Low Flow Alarm   no       Pulsatility Intermittent pulse Intermittent pulse Intermittent pulse   Intermittent pulse Intermittent pulse         PAF (paroxysmal atrial fibrillation)  -Telemetry reveals heart rate less than 100  -Continue amiodarone 400 mg oral daily for rhythm control  -Continue warfarin. INR therapeutic. Goal INR 2-3  -Electrolyte management per Nephrology    ESRD (end stage renal disease)  -On HD (M, W, F).  Kurt cath, right subclavian artery.  HD performed this morning, see dialysis RN note for detail  -Nephrology consulted for renal function management        JUAN Dasilva-BC  Heart Transplant  [facility] - Cardiology Stepdown

## 2025-07-27 LAB
BACTERIA BLD CULT: NORMAL
BACTERIA BLD CULT: NORMAL

## 2025-07-28 ENCOUNTER — ANTI-COAG VISIT (OUTPATIENT)
Dept: CARDIOLOGY | Facility: CLINIC | Age: 65
End: 2025-07-28
Payer: MEDICAID

## 2025-07-28 DIAGNOSIS — Z95.811 LVAD (LEFT VENTRICULAR ASSIST DEVICE) PRESENT: Primary | ICD-10-CM

## 2025-07-28 NOTE — PROGRESS NOTES
Admitted 7/21-7/25. Hospital Course: admitted for complaints progressive weakening bilateral lower extremities, with the associated loss of appetite.  Per the patient's wife, patient also has been complaining of patient having SOB with minimal exertion.  The patient also states that he was continuing to have diarrhea, with frequent bowel movements. Patient was also placed on Questran 4 g b.i.d. to help to decrease watery BMs and have more formed stools.  PT/OT was also consulted on the case to evaluate mobility and ADL functional status.  They recommended the patient would benefit from acute skilled OT services he will most likely go home on home health with PT/OT.  Palliative Medicine was also consulted on the case for goals of care conversations.  Patient will transition to home hospice.

## 2025-07-28 NOTE — PROGRESS NOTES
Spoke with [facility] w/ [facility] [phone] who confirmed they will manage pt warfarin medication.

## (undated) DEVICE — DECANTER FLUID TRNSF WHITE 9IN

## (undated) DEVICE — PAD DEFIB CADENCE ADULT R2

## (undated) DEVICE — COVER PROBE US 5.5X58L NON LTX

## (undated) DEVICE — STAPLER SKIN PROXIMATE WIDE

## (undated) DEVICE — DRESSING TRANS 4X4 TEGADERM

## (undated) DEVICE — SUT 3/0 27IN COATED VICRYL

## (undated) DEVICE — TOWEL OR DISP STRL BLUE 4/PK

## (undated) DEVICE — NDL BOX COUNTER

## (undated) DEVICE — STRIP MEDI WND CLSR 1/2X4IN

## (undated) DEVICE — INTRODUCER RX PSI KIT 8.5 FR

## (undated) DEVICE — PACK DRAPE UNIVERSAL CONVERTOR

## (undated) DEVICE — DRESSING CHG FOAM 7MM 1IN

## (undated) DEVICE — SYR IRRIGATION BULB STER 60ML

## (undated) DEVICE — PLEDGET SUT SOFT 3/8X3/16X1/16

## (undated) DEVICE — RETRACTOR OCTOBASE INSERT HOLD

## (undated) DEVICE — COVER TABLE 44X90 STERILE

## (undated) DEVICE — Device

## (undated) DEVICE — HEMOSTAT SURGICEL NU-KNIT 6X9

## (undated) DEVICE — VAC WOUND DISPOSABLE PREVENA

## (undated) DEVICE — GAUZE SPONGE 4X4 12PLY

## (undated) DEVICE — SUT 3-0 CTD VICRYL 27IN PS

## (undated) DEVICE — GLOVE SURG BIOGEL LATEX SZ 7.5

## (undated) DEVICE — ELECTRODE REM PLYHSV RETURN 9

## (undated) DEVICE — TRAY MINOR GEN SURG OMC

## (undated) DEVICE — CATH ULTRAFLEX 7.5FR 40CC

## (undated) DEVICE — PAD RADIOLUCENT STAT ADULT

## (undated) DEVICE — CUP MEDICINE STERILE 2OZ

## (undated) DEVICE — CONNECTOR 1/4X3/16X3/16 Y

## (undated) DEVICE — LINE INJECTION CLEARACIL 25.4

## (undated) DEVICE — SUT PROLENE 5-0 36IN C-1

## (undated) DEVICE — GLOVE BIOGEL PI MICRO SZ 7.5

## (undated) DEVICE — CATH THOR STND RGHT ANG 32FR

## (undated) DEVICE — GUIDEWIRE SUPRA CORE 035 190CM

## (undated) DEVICE — KIT SYR REUSABLE

## (undated) DEVICE — KIT CUSTOM MANIFOLD

## (undated) DEVICE — SPONGE COTTON TRAY 4X4IN

## (undated) DEVICE — CATH CV QD LUMN 6FRX110CM

## (undated) DEVICE — TRANSDUCER ADULT DISP

## (undated) DEVICE — GUIDEWIRE EMERALD .035IN 260CM

## (undated) DEVICE — DRAPE INCISE IOBAN 2 23X17IN

## (undated) DEVICE — OMNIPAQUE 300MG 50ML

## (undated) DEVICE — SUT VICRYL BR 1 GEN 27 CT-1

## (undated) DEVICE — DRAPE PED LAP SURG 108X77IN

## (undated) DEVICE — SHEATH INTRODUCER 7FR 11CM

## (undated) DEVICE — SPIKE SHORT LG BORE 1-WAY 2IN

## (undated) DEVICE — KIT GLIDESHEATH SLEND 6FR 10CM

## (undated) DEVICE — TRAY CATH LAB OMC

## (undated) DEVICE — SPONGE LAP 18X18 PREWASHED

## (undated) DEVICE — CATH THORACIC 32FR ST

## (undated) DEVICE — PAD K-THERMIA 24IN X 60IN

## (undated) DEVICE — DRAPE CVMAX SPLIT ANES SCRN

## (undated) DEVICE — TRAY SKIN SCRUB WET PREMIUM

## (undated) DEVICE — KIT PREVENA PLUS

## (undated) DEVICE — SUT ETHIBOND XTRA 1 CTX

## (undated) DEVICE — SUT SILK BLK BR. 2 2-60

## (undated) DEVICE — BOOT SUTURE AID

## (undated) DEVICE — SOL 9P NACL IRR PIC IL

## (undated) DEVICE — ELECTRODE EXTENDED BLADE

## (undated) DEVICE — DRAPE ANGIO BRACH 38X44IN

## (undated) DEVICE — BLADE 4 INCH EDGE UN-INS

## (undated) DEVICE — SHEATH INTRODUCER 8FR 11CM

## (undated) DEVICE — VALVE ULTRASITE MALE LUER

## (undated) DEVICE — KIT MICROINTRODUCE MINI 5X10CM

## (undated) DEVICE — DRAIN CHEST DRY SUCTION

## (undated) DEVICE — DRESSING AQUACEL SACRAL 9 X 9

## (undated) DEVICE — DRAPE SLUSH WARMER WITH DISC

## (undated) DEVICE — TUBING SUC UNIV W/CONN 12FT

## (undated) DEVICE — PACK ECLIPSE SET-UP W/O DRAPE

## (undated) DEVICE — CATH SWAN GANZ STND 7FR

## (undated) DEVICE — BIOGLUE 10ML

## (undated) DEVICE — DRESSING TRANS 6X8 TEGADERM

## (undated) DEVICE — BOWL STERILE LARGE 32OZ

## (undated) DEVICE — PATCH SEALANT EVARREST 2X4

## (undated) DEVICE — TAPE SURG MEDIPORE 6X72IN

## (undated) DEVICE — TRAY HEART OMC

## (undated) DEVICE — SUT BONE WAX 2.5 GRMS 12/BX

## (undated) DEVICE — TIP YANKAUERS BULB NO VENT

## (undated) DEVICE — GOWN POLY REINF BRTH SLV XL

## (undated) DEVICE — SOL NS 1000CC

## (undated) DEVICE — HEMOSTAT SURGICEL PWD 3G

## (undated) DEVICE — KIT URINARY CATH URINE METER

## (undated) DEVICE — DRESSING ABSRBNT ISLAND 3.6X8

## (undated) DEVICE — SUT SILK 2-0 SH 18IN BLACK

## (undated) DEVICE — SET MICROPUNCTURE 5FR 501NT

## (undated) DEVICE — POUCH SELFSEAL GAS STM 5.25X10

## (undated) DEVICE — TRAY SUT REM SCISSOR FORCEP

## (undated) DEVICE — INTRODUCER 7FR PERCUTANEOUS

## (undated) DEVICE — BLADE SAW STERNAL 5/BX

## (undated) DEVICE — KIT MICROINTRO 4F .018X40X7CM

## (undated) DEVICE — PACK HEART CATH BR

## (undated) DEVICE — SUT PROLENE 5-0 BL C-1 4-24

## (undated) DEVICE — SUT PROLENE 4-0 RB-1 BL MO

## (undated) DEVICE — CONTAINER SPECIMEN STRL 4OZ

## (undated) DEVICE — ANGIOTOUCH KIT

## (undated) DEVICE — BOOT AIR FLUID HEEL ADLT STD

## (undated) DEVICE — LINE 60IN PRESSURE MON.

## (undated) DEVICE — SWABSTICK BENZOIN 4 IN

## (undated) DEVICE — KIT SAHARA DRAPE DRAW/LIFT

## (undated) DEVICE — DRAPE STERI INSTRUMENT 1018

## (undated) DEVICE — OMNIPAQUE 300MG 150ML VIAL

## (undated) DEVICE — KIT MANIFOLD LOW PRESS TUBING

## (undated) DEVICE — HOLDER TUBE

## (undated) DEVICE — SUT 2/0 36IN ETHIBOND EXCE

## (undated) DEVICE — SUT 6 18IN STEEL MONO CCS

## (undated) DEVICE — SUT 2/0 36IN COATED VICRYL

## (undated) DEVICE — DRESSING TRANS 8X12 TEGADERM

## (undated) DEVICE — SUT PROLENE 4-0 SH BLU 36IN

## (undated) DEVICE — APPLICATOR CHLORAPREP ORN 26ML

## (undated) DEVICE — REMOVER STAPLE SKIN STERILE